# Patient Record
Sex: MALE | Race: WHITE | NOT HISPANIC OR LATINO | Employment: OTHER | URBAN - METROPOLITAN AREA
[De-identification: names, ages, dates, MRNs, and addresses within clinical notes are randomized per-mention and may not be internally consistent; named-entity substitution may affect disease eponyms.]

---

## 2017-11-10 ENCOUNTER — HOSPITAL ENCOUNTER (INPATIENT)
Facility: MEDICAL CENTER | Age: 52
LOS: 11 days | DRG: 854 | End: 2017-11-21
Attending: EMERGENCY MEDICINE | Admitting: HOSPITALIST
Payer: MEDICAID

## 2017-11-10 ENCOUNTER — APPOINTMENT (OUTPATIENT)
Dept: RADIOLOGY | Facility: MEDICAL CENTER | Age: 52
DRG: 854 | End: 2017-11-10
Attending: EMERGENCY MEDICINE
Payer: MEDICAID

## 2017-11-10 ENCOUNTER — RESOLUTE PROFESSIONAL BILLING HOSPITAL PROF FEE (OUTPATIENT)
Dept: HOSPITALIST | Facility: MEDICAL CENTER | Age: 52
End: 2017-11-10
Payer: MEDICAID

## 2017-11-10 DIAGNOSIS — R65.20 SEVERE SEPSIS (HCC): ICD-10-CM

## 2017-11-10 DIAGNOSIS — L89.899 DECUBITUS ULCER OF RIGHT FOOT, UNSPECIFIED ULCER STAGE: ICD-10-CM

## 2017-11-10 DIAGNOSIS — A41.9 SEVERE SEPSIS (HCC): ICD-10-CM

## 2017-11-10 DIAGNOSIS — E87.20 LACTIC ACIDOSIS: ICD-10-CM

## 2017-11-10 DIAGNOSIS — I96 GANGRENE OF TOE OF RIGHT FOOT (HCC): ICD-10-CM

## 2017-11-10 DIAGNOSIS — L03.115 CELLULITIS OF RIGHT LOWER EXTREMITY: ICD-10-CM

## 2017-11-10 LAB
ALBUMIN SERPL BCP-MCNC: 3.4 G/DL (ref 3.2–4.9)
ALBUMIN/GLOB SERPL: 0.8 G/DL
ALP SERPL-CCNC: 94 U/L (ref 30–99)
ALT SERPL-CCNC: 24 U/L (ref 2–50)
ANION GAP SERPL CALC-SCNC: 15 MMOL/L (ref 0–11.9)
AST SERPL-CCNC: 21 U/L (ref 12–45)
BASOPHILS # BLD AUTO: 0 % (ref 0–1.8)
BASOPHILS # BLD: 0 K/UL (ref 0–0.12)
BILIRUB SERPL-MCNC: 1 MG/DL (ref 0.1–1.5)
BNP SERPL-MCNC: 43 PG/ML (ref 0–100)
BUN SERPL-MCNC: 22 MG/DL (ref 8–22)
CALCIUM SERPL-MCNC: 9.1 MG/DL (ref 8.5–10.5)
CHLORIDE SERPL-SCNC: 95 MMOL/L (ref 96–112)
CO2 SERPL-SCNC: 21 MMOL/L (ref 20–33)
CREAT SERPL-MCNC: 0.98 MG/DL (ref 0.5–1.4)
EKG IMPRESSION: NORMAL
EOSINOPHIL # BLD AUTO: 0.14 K/UL (ref 0–0.51)
EOSINOPHIL NFR BLD: 0.9 % (ref 0–6.9)
ERYTHROCYTE [DISTWIDTH] IN BLOOD BY AUTOMATED COUNT: 38.6 FL (ref 35.9–50)
GFR SERPL CREATININE-BSD FRML MDRD: >60 ML/MIN/1.73 M 2
GLOBULIN SER CALC-MCNC: 4.4 G/DL (ref 1.9–3.5)
GLUCOSE BLD-MCNC: 182 MG/DL (ref 65–99)
GLUCOSE BLD-MCNC: 207 MG/DL (ref 65–99)
GLUCOSE SERPL-MCNC: 229 MG/DL (ref 65–99)
HCT VFR BLD AUTO: 49.2 % (ref 42–52)
HGB BLD-MCNC: 16.9 G/DL (ref 14–18)
LACTATE BLD-SCNC: 1.6 MMOL/L (ref 0.5–2)
LACTATE BLD-SCNC: 1.8 MMOL/L (ref 0.5–2)
LACTATE BLD-SCNC: 3.3 MMOL/L (ref 0.5–2)
LYMPHOCYTES # BLD AUTO: 2.91 K/UL (ref 1–4.8)
LYMPHOCYTES NFR BLD: 19.3 % (ref 22–41)
MANUAL DIFF BLD: NORMAL
MCH RBC QN AUTO: 28 PG (ref 27–33)
MCHC RBC AUTO-ENTMCNC: 34.3 G/DL (ref 33.7–35.3)
MCV RBC AUTO: 81.5 FL (ref 81.4–97.8)
MICROCYTES BLD QL SMEAR: ABNORMAL
MONOCYTES # BLD AUTO: 0.83 K/UL (ref 0–0.85)
MONOCYTES NFR BLD AUTO: 5.5 % (ref 0–13.4)
MORPHOLOGY BLD-IMP: NORMAL
NEUTROPHILS # BLD AUTO: 11.08 K/UL (ref 1.82–7.42)
NEUTROPHILS NFR BLD: 73.4 % (ref 44–72)
NRBC # BLD AUTO: 0 K/UL
NRBC BLD AUTO-RTO: 0 /100 WBC
PLATELET # BLD AUTO: 334 K/UL (ref 164–446)
PLATELET BLD QL SMEAR: NORMAL
PMV BLD AUTO: 10.8 FL (ref 9–12.9)
POIKILOCYTOSIS BLD QL SMEAR: NORMAL
POTASSIUM SERPL-SCNC: 3.3 MMOL/L (ref 3.6–5.5)
PROMYELOCYTES NFR BLD MANUAL: 0.9 %
PROT SERPL-MCNC: 7.8 G/DL (ref 6–8.2)
RBC # BLD AUTO: 6.04 M/UL (ref 4.7–6.1)
RBC BLD AUTO: PRESENT
SODIUM SERPL-SCNC: 131 MMOL/L (ref 135–145)
TROPONIN I SERPL-MCNC: <0.01 NG/ML (ref 0–0.04)
WBC # BLD AUTO: 15.1 K/UL (ref 4.8–10.8)

## 2017-11-10 PROCEDURE — 84484 ASSAY OF TROPONIN QUANT: CPT

## 2017-11-10 PROCEDURE — 500881 HCHG PACK, EXTREMITY: Performed by: ORTHOPAEDIC SURGERY

## 2017-11-10 PROCEDURE — 87186 SC STD MICRODIL/AGAR DIL: CPT

## 2017-11-10 PROCEDURE — 87015 SPECIMEN INFECT AGNT CONCNTJ: CPT | Mod: 91

## 2017-11-10 PROCEDURE — 160039 HCHG SURGERY MINUTES - EA ADDL 1 MIN LEVEL 3: Performed by: ORTHOPAEDIC SURGERY

## 2017-11-10 PROCEDURE — 85007 BL SMEAR W/DIFF WBC COUNT: CPT

## 2017-11-10 PROCEDURE — 160009 HCHG ANES TIME/MIN: Performed by: ORTHOPAEDIC SURGERY

## 2017-11-10 PROCEDURE — 700105 HCHG RX REV CODE 258

## 2017-11-10 PROCEDURE — 88311 DECALCIFY TISSUE: CPT

## 2017-11-10 PROCEDURE — 700102 HCHG RX REV CODE 250 W/ 637 OVERRIDE(OP)

## 2017-11-10 PROCEDURE — 93005 ELECTROCARDIOGRAM TRACING: CPT | Performed by: EMERGENCY MEDICINE

## 2017-11-10 PROCEDURE — 87075 CULTR BACTERIA EXCEPT BLOOD: CPT

## 2017-11-10 PROCEDURE — 99223 1ST HOSP IP/OBS HIGH 75: CPT | Performed by: HOSPITALIST

## 2017-11-10 PROCEDURE — 160028 HCHG SURGERY MINUTES - 1ST 30 MINS LEVEL 3: Performed by: ORTHOPAEDIC SURGERY

## 2017-11-10 PROCEDURE — 700101 HCHG RX REV CODE 250

## 2017-11-10 PROCEDURE — 700111 HCHG RX REV CODE 636 W/ 250 OVERRIDE (IP): Performed by: HOSPITALIST

## 2017-11-10 PROCEDURE — 700111 HCHG RX REV CODE 636 W/ 250 OVERRIDE (IP): Performed by: FAMILY MEDICINE

## 2017-11-10 PROCEDURE — 99285 EMERGENCY DEPT VISIT HI MDM: CPT

## 2017-11-10 PROCEDURE — 700105 HCHG RX REV CODE 258: Performed by: HOSPITALIST

## 2017-11-10 PROCEDURE — 160035 HCHG PACU - 1ST 60 MINS PHASE I: Performed by: ORTHOPAEDIC SURGERY

## 2017-11-10 PROCEDURE — 0Y6P0Z0 DETACHMENT AT RIGHT 1ST TOE, COMPLETE, OPEN APPROACH: ICD-10-PCS | Performed by: ORTHOPAEDIC SURGERY

## 2017-11-10 PROCEDURE — 87077 CULTURE AEROBIC IDENTIFY: CPT

## 2017-11-10 PROCEDURE — 700105 HCHG RX REV CODE 258: Performed by: EMERGENCY MEDICINE

## 2017-11-10 PROCEDURE — 700102 HCHG RX REV CODE 250 W/ 637 OVERRIDE(OP): Performed by: HOSPITALIST

## 2017-11-10 PROCEDURE — 87205 SMEAR GRAM STAIN: CPT | Mod: 91

## 2017-11-10 PROCEDURE — A9270 NON-COVERED ITEM OR SERVICE: HCPCS

## 2017-11-10 PROCEDURE — 700111 HCHG RX REV CODE 636 W/ 250 OVERRIDE (IP)

## 2017-11-10 PROCEDURE — 83880 ASSAY OF NATRIURETIC PEPTIDE: CPT

## 2017-11-10 PROCEDURE — 73630 X-RAY EXAM OF FOOT: CPT | Mod: RT

## 2017-11-10 PROCEDURE — 96375 TX/PRO/DX INJ NEW DRUG ADDON: CPT

## 2017-11-10 PROCEDURE — 88305 TISSUE EXAM BY PATHOLOGIST: CPT

## 2017-11-10 PROCEDURE — 71010 DX-CHEST-PORTABLE (1 VIEW): CPT

## 2017-11-10 PROCEDURE — 36415 COLL VENOUS BLD VENIPUNCTURE: CPT

## 2017-11-10 PROCEDURE — 160048 HCHG OR STATISTICAL LEVEL 1-5: Performed by: ORTHOPAEDIC SURGERY

## 2017-11-10 PROCEDURE — 96367 TX/PROPH/DG ADDL SEQ IV INF: CPT

## 2017-11-10 PROCEDURE — 85027 COMPLETE CBC AUTOMATED: CPT

## 2017-11-10 PROCEDURE — 160002 HCHG RECOVERY MINUTES (STAT): Performed by: ORTHOPAEDIC SURGERY

## 2017-11-10 PROCEDURE — 82962 GLUCOSE BLOOD TEST: CPT

## 2017-11-10 PROCEDURE — 700111 HCHG RX REV CODE 636 W/ 250 OVERRIDE (IP): Performed by: EMERGENCY MEDICINE

## 2017-11-10 PROCEDURE — 501838 HCHG SUTURE GENERAL: Performed by: ORTHOPAEDIC SURGERY

## 2017-11-10 PROCEDURE — 83605 ASSAY OF LACTIC ACID: CPT | Mod: 91

## 2017-11-10 PROCEDURE — 87040 BLOOD CULTURE FOR BACTERIA: CPT | Mod: 91

## 2017-11-10 PROCEDURE — 80053 COMPREHEN METABOLIC PANEL: CPT

## 2017-11-10 PROCEDURE — 87070 CULTURE OTHR SPECIMN AEROBIC: CPT | Mod: 91

## 2017-11-10 PROCEDURE — 700101 HCHG RX REV CODE 250: Performed by: HOSPITALIST

## 2017-11-10 PROCEDURE — 96365 THER/PROPH/DIAG IV INF INIT: CPT

## 2017-11-10 PROCEDURE — 160036 HCHG PACU - EA ADDL 30 MINS PHASE I: Performed by: ORTHOPAEDIC SURGERY

## 2017-11-10 PROCEDURE — 770020 HCHG ROOM/CARE - TELE (206)

## 2017-11-10 RX ORDER — PROMETHAZINE HYDROCHLORIDE 25 MG/1
12.5-25 TABLET ORAL EVERY 4 HOURS PRN
Status: DISCONTINUED | OUTPATIENT
Start: 2017-11-10 | End: 2017-11-21 | Stop reason: HOSPADM

## 2017-11-10 RX ORDER — AMOXICILLIN 250 MG
2 CAPSULE ORAL 2 TIMES DAILY
Status: DISCONTINUED | OUTPATIENT
Start: 2017-11-10 | End: 2017-11-21 | Stop reason: HOSPADM

## 2017-11-10 RX ORDER — AMPICILLIN AND SULBACTAM 2; 1 G/1; G/1
3 INJECTION, POWDER, FOR SOLUTION INTRAMUSCULAR; INTRAVENOUS ONCE
Status: COMPLETED | OUTPATIENT
Start: 2017-11-10 | End: 2017-11-10

## 2017-11-10 RX ORDER — SODIUM CHLORIDE 9 MG/ML
30 INJECTION, SOLUTION INTRAVENOUS
Status: DISCONTINUED | OUTPATIENT
Start: 2017-11-10 | End: 2017-11-10

## 2017-11-10 RX ORDER — NYSTATIN 100000 [USP'U]/G
POWDER TOPICAL 2 TIMES DAILY
Status: DISCONTINUED | OUTPATIENT
Start: 2017-11-10 | End: 2017-11-21 | Stop reason: HOSPADM

## 2017-11-10 RX ORDER — SODIUM CHLORIDE 9 MG/ML
1000 INJECTION, SOLUTION INTRAVENOUS
Status: COMPLETED | OUTPATIENT
Start: 2017-11-10 | End: 2017-11-10

## 2017-11-10 RX ORDER — SODIUM CHLORIDE 9 MG/ML
30 INJECTION, SOLUTION INTRAVENOUS
Status: DISCONTINUED | OUTPATIENT
Start: 2017-11-10 | End: 2017-11-11

## 2017-11-10 RX ORDER — ONDANSETRON 2 MG/ML
4 INJECTION INTRAMUSCULAR; INTRAVENOUS ONCE
Status: COMPLETED | OUTPATIENT
Start: 2017-11-10 | End: 2017-11-10

## 2017-11-10 RX ORDER — SODIUM CHLORIDE AND POTASSIUM CHLORIDE 150; 900 MG/100ML; MG/100ML
INJECTION, SOLUTION INTRAVENOUS CONTINUOUS
Status: DISCONTINUED | OUTPATIENT
Start: 2017-11-10 | End: 2017-11-11

## 2017-11-10 RX ORDER — PROMETHAZINE HYDROCHLORIDE 25 MG/1
12.5-25 SUPPOSITORY RECTAL EVERY 4 HOURS PRN
Status: DISCONTINUED | OUTPATIENT
Start: 2017-11-10 | End: 2017-11-21 | Stop reason: HOSPADM

## 2017-11-10 RX ORDER — BISACODYL 10 MG
10 SUPPOSITORY, RECTAL RECTAL
Status: DISCONTINUED | OUTPATIENT
Start: 2017-11-10 | End: 2017-11-21 | Stop reason: HOSPADM

## 2017-11-10 RX ORDER — SODIUM CHLORIDE 9 MG/ML
INJECTION, SOLUTION INTRAVENOUS
Status: COMPLETED
Start: 2017-11-10 | End: 2017-11-10

## 2017-11-10 RX ORDER — MORPHINE SULFATE 4 MG/ML
2 INJECTION, SOLUTION INTRAMUSCULAR; INTRAVENOUS EVERY 6 HOURS PRN
Status: DISCONTINUED | OUTPATIENT
Start: 2017-11-10 | End: 2017-11-21 | Stop reason: HOSPADM

## 2017-11-10 RX ORDER — DEXTROSE MONOHYDRATE 25 G/50ML
25 INJECTION, SOLUTION INTRAVENOUS
Status: DISCONTINUED | OUTPATIENT
Start: 2017-11-10 | End: 2017-11-21 | Stop reason: HOSPADM

## 2017-11-10 RX ORDER — SODIUM CHLORIDE 9 MG/ML
500 INJECTION, SOLUTION INTRAVENOUS
Status: DISCONTINUED | OUTPATIENT
Start: 2017-11-10 | End: 2017-11-11

## 2017-11-10 RX ORDER — ONDANSETRON 4 MG/1
4 TABLET, ORALLY DISINTEGRATING ORAL EVERY 4 HOURS PRN
Status: DISCONTINUED | OUTPATIENT
Start: 2017-11-10 | End: 2017-11-21 | Stop reason: HOSPADM

## 2017-11-10 RX ORDER — POLYETHYLENE GLYCOL 3350 17 G/17G
1 POWDER, FOR SOLUTION ORAL
Status: DISCONTINUED | OUTPATIENT
Start: 2017-11-10 | End: 2017-11-21 | Stop reason: HOSPADM

## 2017-11-10 RX ORDER — ONDANSETRON 2 MG/ML
4 INJECTION INTRAMUSCULAR; INTRAVENOUS EVERY 4 HOURS PRN
Status: DISCONTINUED | OUTPATIENT
Start: 2017-11-10 | End: 2017-11-21 | Stop reason: HOSPADM

## 2017-11-10 RX ORDER — MORPHINE SULFATE 4 MG/ML
4 INJECTION, SOLUTION INTRAMUSCULAR; INTRAVENOUS ONCE
Status: COMPLETED | OUTPATIENT
Start: 2017-11-10 | End: 2017-11-10

## 2017-11-10 RX ADMIN — AMPICILLIN SODIUM AND SULBACTAM SODIUM 3 G: 2; 1 INJECTION, POWDER, FOR SOLUTION INTRAMUSCULAR; INTRAVENOUS at 15:09

## 2017-11-10 RX ADMIN — SODIUM CHLORIDE 1000 ML: 9 INJECTION, SOLUTION INTRAVENOUS at 15:03

## 2017-11-10 RX ADMIN — INSULIN HUMAN 4 UNITS: 100 INJECTION, SOLUTION PARENTERAL at 22:02

## 2017-11-10 RX ADMIN — SODIUM CHLORIDE 500 ML: 9 INJECTION, SOLUTION INTRAVENOUS at 21:34

## 2017-11-10 RX ADMIN — POTASSIUM CHLORIDE AND SODIUM CHLORIDE: 900; 150 INJECTION, SOLUTION INTRAVENOUS at 17:45

## 2017-11-10 RX ADMIN — HYDROCODONE BITARTRATE AND ACETAMINOPHEN 15 ML: 2.5; 108 SOLUTION ORAL at 20:15

## 2017-11-10 RX ADMIN — NYSTATIN 1500000 UNITS: 100000 POWDER TOPICAL at 21:56

## 2017-11-10 RX ADMIN — AMPICILLIN SODIUM AND SULBACTAM SODIUM 3 G: 2; 1 INJECTION, POWDER, FOR SOLUTION INTRAMUSCULAR; INTRAVENOUS at 21:32

## 2017-11-10 RX ADMIN — MORPHINE SULFATE 2 MG: 4 INJECTION INTRAVENOUS at 23:51

## 2017-11-10 RX ADMIN — ENOXAPARIN SODIUM 40 MG: 100 INJECTION SUBCUTANEOUS at 17:45

## 2017-11-10 RX ADMIN — FENTANYL CITRATE 50 MCG: 50 INJECTION, SOLUTION INTRAMUSCULAR; INTRAVENOUS at 20:05

## 2017-11-10 RX ADMIN — VANCOMYCIN HYDROCHLORIDE 3000 MG: 5 INJECTION, POWDER, LYOPHILIZED, FOR SOLUTION INTRAVENOUS at 16:11

## 2017-11-10 RX ADMIN — ONDANSETRON 4 MG: 2 INJECTION INTRAMUSCULAR; INTRAVENOUS at 15:03

## 2017-11-10 RX ADMIN — MORPHINE SULFATE 4 MG: 4 INJECTION INTRAVENOUS at 15:03

## 2017-11-10 ASSESSMENT — PAIN SCALES - GENERAL
PAINLEVEL_OUTOF10: 2
PAINLEVEL_OUTOF10: 0
PAINLEVEL_OUTOF10: 2
PAINLEVEL_OUTOF10: 4
PAINLEVEL_OUTOF10: 7
PAINLEVEL_OUTOF10: 0
PAINLEVEL_OUTOF10: 2
PAINLEVEL_OUTOF10: 0

## 2017-11-10 ASSESSMENT — COPD QUESTIONNAIRES
DO YOU EVER COUGH UP ANY MUCUS OR PHLEGM?: NO/ONLY WITH OCCASIONAL COLDS OR INFECTIONS
DURING THE PAST 4 WEEKS HOW MUCH DID YOU FEEL SHORT OF BREATH: NONE/LITTLE OF THE TIME
COPD SCREENING SCORE: 1
HAVE YOU SMOKED AT LEAST 100 CIGARETTES IN YOUR ENTIRE LIFE: NO/DON'T KNOW

## 2017-11-10 ASSESSMENT — PATIENT HEALTH QUESTIONNAIRE - PHQ9
6. FEELING BAD ABOUT YOURSELF - OR THAT YOU ARE A FAILURE OR HAVE LET YOURSELF OR YOUR FAMILY DOWN: SEVERAL DAYS
3. TROUBLE FALLING OR STAYING ASLEEP OR SLEEPING TOO MUCH: NOT AT ALL
9. THOUGHTS THAT YOU WOULD BE BETTER OFF DEAD, OR OF HURTING YOURSELF: NOT AT ALL
SUM OF ALL RESPONSES TO PHQ9 QUESTIONS 1 AND 2: 2
5. POOR APPETITE OR OVEREATING: NOT AT ALL
2. FEELING DOWN, DEPRESSED, IRRITABLE, OR HOPELESS: SEVERAL DAYS
SUM OF ALL RESPONSES TO PHQ QUESTIONS 1-9: 3
1. LITTLE INTEREST OR PLEASURE IN DOING THINGS: SEVERAL DAYS
8. MOVING OR SPEAKING SO SLOWLY THAT OTHER PEOPLE COULD HAVE NOTICED. OR THE OPPOSITE, BEING SO FIGETY OR RESTLESS THAT YOU HAVE BEEN MOVING AROUND A LOT MORE THAN USUAL: NOT AT ALL
7. TROUBLE CONCENTRATING ON THINGS, SUCH AS READING THE NEWSPAPER OR WATCHING TELEVISION: NOT AT ALL
4. FEELING TIRED OR HAVING LITTLE ENERGY: NOT AT ALL

## 2017-11-10 ASSESSMENT — ENCOUNTER SYMPTOMS
SHORTNESS OF BREATH: 1
NAUSEA: 1
FALLS: 1
VOMITING: 0
WEAKNESS: 1
TINGLING: 1
CHILLS: 1

## 2017-11-10 ASSESSMENT — LIFESTYLE VARIABLES
EVER_SMOKED: NEVER
DO YOU DRINK ALCOHOL: NO
ALCOHOL_USE: NO

## 2017-11-10 NOTE — ED PROVIDER NOTES
ED Provider Note    Scribed for Russ Kang M.D. by Cammie Cardona. 11/10/2017, 2:32 PM.    Primary care provider: Pcp Pt States None  Means of arrival: ambulance   History obtained from: patient   History limited by: none     CHIEF COMPLAINT  Chief Complaint   Patient presents with   • T-5000 GLF     Tripped on shoe lace while at job interview   • Shortness of Breath     Since last night   • Weakness     Generalized weakness   • Open Wound     Right great toe large open wound. States it opened up yesterday.        HPI  Sebastián Castro is a 52 y.o. male who presents to the Emergency Department for evaluation of an open wound to his right great toe which was first noted last night after he fell. Initially, he thought his wound was a blood blister but states his wound has since opened up. He has associated pain and tingling to the affected area that is exacerbated with walking. Patient also has redness to his wound which spreads up his right leg. He confirms nausea and chills. Patient denies vomiting. He also complains of shortness of breath and generalized weakness.  He denies history of diabetes.         REVIEW OF SYSTEMS  Review of Systems   Constitutional: Positive for chills.   Respiratory: Positive for shortness of breath.    Gastrointestinal: Positive for nausea. Negative for vomiting.   Musculoskeletal: Positive for falls.        + pain to right great toe with redness and tingling    Neurological: Positive for tingling and weakness.   All other systems reviewed and are negative.  C.       PAST MEDICAL HISTORY   has a past medical history of Hypertension.      SURGICAL HISTORY  patient denies any surgical history      SOCIAL HISTORY  Social History   Substance Use Topics   • Smoking status: Never Smoker        • Alcohol use No      History   Drug use: Unknown         CURRENT MEDICATIONS  Home Medications    **Home medications have not yet been reviewed for this encounter**         ALLERGIES  Allergies  "  Allergen Reactions   • Pcn [Penicillins] Hives and Rash      Rash & hives       PHYSICAL EXAM  VITAL SIGNS: BP (!) 162/94   Pulse (!) 120   Temp 36.5 °C (97.7 °F) (Temporal)   Resp 18   Ht 1.829 m (6')   Wt (!) 143.3 kg (315 lb 14.7 oz)   SpO2 99%   BMI 42.85 kg/m²   Constitutional:   No acute distress  HENT:  Moist mucous membranes  Eyes:  No conjunctivitis or icterus  Neck:  trachea is midline, no palpable thyroid  Lymphatic:  No cervical lymphadenopathy. No inguinal adenopathy.   Cardiovascular: Tachycardic, Regular rhythm, no murmurs  Thorax & Lungs:  Normal breath sounds, no rhonchi  Abdomen:  Obese. Soft, Non-tender  Skin:. Erythema within the pannus with breakdown and possible cellulitis. Ecchymosis at the pad of the right great with swelling and clear drainage from the first MTP joint to the dorsum of the foot extending to the lower half of the leg.   Back:  Non-tender, no CVA tenderness  Vascular:  symmetric radial pulse  Neurologic:  Normal gross motor          LABS  Labs Reviewed   LACTIC ACID - Abnormal; Notable for the following:        Result Value    Lactic Acid 3.3 (*)     All other components within normal limits   CBC WITH DIFFERENTIAL - Abnormal; Notable for the following:     WBC 15.1 (*)     Neutrophils-Polys 73.40 (*)     Lymphocytes 19.30 (*)     Neutrophils (Absolute) 11.08 (*)     All other components within normal limits   COMP METABOLIC PANEL - Abnormal; Notable for the following:     Sodium 131 (*)     Potassium 3.3 (*)     Chloride 95 (*)     Anion Gap 15.0 (*)     Glucose 229 (*)     Globulin 4.4 (*)     All other components within normal limits   LACTIC ACID   BLOOD CULTURE    Narrative:     Per Hospital Policy: Only change Specimen Src: to \"Line\" if  specified by physician order.   BLOOD CULTURE    Narrative:     Per Hospital Policy: Only change Specimen Src: to \"Line\" if  specified by physician order.   TROPONIN   BTYPE NATRIURETIC PEPTIDE   DIFFERENTIAL MANUAL   PERIPHERAL " SMEAR REVIEW   PLATELET ESTIMATE   MORPHOLOGY   ESTIMATED GFR   URINALYSIS   URINE CULTURE(NEW)   LACTIC ACID   URINE CULTURE(NEW)   All labs reviewed by me.        EKG Interpretation  Interpreted by me  Normal Sinus Rhythm. Rate 93  Non specific conduction delay  Normal corrected QTc  normal QRS  left [place]      RADIOLOGY  DX-FOOT-COMPLETE 3+ RIGHT   Final Result      1.  Soft tissue gas consistent with open wound in the plantar aspect of the distal medial aspect of the right 1st toe.      2.  No plain film evidence of osteomyelitis.      DX-CHEST-PORTABLE (1 VIEW)   Final Result      No pneumothorax identified.        The radiologist's interpretation of all radiological studies have been reviewed by me.    COURSE & MEDICAL DECISION MAKING  Pertinent Labs & Imaging studies reviewed. (See chart for details)    The differential diagnoses include but are not limited to: foot cellulitis with lymphangitis, yeast infection of the inguinal area and pannus with secondary bacterial infection.      2:32 PM - Patient seen and examined at bedside. Patient will be treated with IV fluids secondary to sepsis protocol, Unasyn 3 g, Zofran 4 mg, morphine 4 mg, Vancocin 3,000 mg. Ordered DX foot, DX chest, lactic acid, platelet estimate, estimated GFR, troponin, BNP, CBC, CMP, urine culture and blood culture  to evaluate his symptoms.     4:09 PM Consult with Dr. De La O, hospitalist, who agrees to admit the patient.       CRITICAL CARE  The very real possibilty of a deterioration of this patient's condition required the highest level of my preparedness for sudden, emergent intervention.  I provided critical care services, which included medication orders, frequent reevaluations of the patient's condition and response to treatment, ordering and reviewing test results, and discussing the case with various consultants.  The critical care time associated with the care of the patient was 35 minutes. Review chart for interventions. This  time is exclusive of any other billable procedures.       Medical Decision Making:  Patient presents with toe swelling erythema and cellulitis of the foot with lymphangitis. He also appears to have intertriginous cellulitis. Does have a significant lactic acidosis and tachycardia. Blood cultures were obtained the patient started on antibiotics. He is given a 30 mL/kg bolus for sepsis. Tetanus is up-to-date. In contacting hospitalist for admission.      DISPOSITION:  Patient will be admitted to Dr. De La O in critical condition.      FINAL IMPRESSION  1. Cellulitis of right lower extremity    2. Lactic acidosis      Critical care time of 35 minutes, as outlined above.       Cammie CLARKE (Johannaibshanel), am scribing for, and in the presence of, Russ Kang M.D..  Electronically signed by: Cammie Cardona (Antonina), 11/10/2017  Russ CLARKE M.D. personally performed the services described in this documentation, as scribed by Cammie Cardona in my presence, and it is both accurate and complete.    The note accurately reflects work and decisions made by me.  Russ Kang  11/10/2017  5:04 PM

## 2017-11-10 NOTE — ED NOTES
Ambulatory to triage with   Chief Complaint   Patient presents with   • T-5000 GLF     Tripped on shoe lace while at job interview   • Shortness of Breath     Since last night   • Weakness     Generalized weakness   • Open Wound     Right great toe large open wound. States it opened up yesterday.    Sepsis score is a 3. Charge RN aware.

## 2017-11-11 PROBLEM — I96 GANGRENE OF TOE OF RIGHT FOOT (HCC): Status: ACTIVE | Noted: 2017-11-11

## 2017-11-11 PROBLEM — R73.9 HYPERGLYCEMIA: Status: ACTIVE | Noted: 2017-11-11

## 2017-11-11 PROBLEM — E87.1 HYPONATREMIA: Status: ACTIVE | Noted: 2017-11-11

## 2017-11-11 PROBLEM — B37.2 CANDIDAL INTERTRIGO: Status: ACTIVE | Noted: 2017-11-11

## 2017-11-11 PROBLEM — E87.6 HYPOKALEMIA: Status: ACTIVE | Noted: 2017-11-11

## 2017-11-11 LAB
ANION GAP SERPL CALC-SCNC: 7 MMOL/L (ref 0–11.9)
APPEARANCE UR: ABNORMAL
BACTERIA #/AREA URNS HPF: NEGATIVE /HPF
BILIRUB UR QL STRIP.AUTO: ABNORMAL
BUN SERPL-MCNC: 21 MG/DL (ref 8–22)
CALCIUM SERPL-MCNC: 8.1 MG/DL (ref 8.5–10.5)
CHLORIDE SERPL-SCNC: 102 MMOL/L (ref 96–112)
CO2 SERPL-SCNC: 22 MMOL/L (ref 20–33)
COLOR UR: ABNORMAL
CREAT SERPL-MCNC: 0.75 MG/DL (ref 0.5–1.4)
EPI CELLS #/AREA URNS HPF: ABNORMAL /HPF
ERYTHROCYTE [DISTWIDTH] IN BLOOD BY AUTOMATED COUNT: 38.9 FL (ref 35.9–50)
EST. AVERAGE GLUCOSE BLD GHB EST-MCNC: 217 MG/DL
GFR SERPL CREATININE-BSD FRML MDRD: >60 ML/MIN/1.73 M 2
GLUCOSE BLD-MCNC: 150 MG/DL (ref 65–99)
GLUCOSE BLD-MCNC: 154 MG/DL (ref 65–99)
GLUCOSE BLD-MCNC: 165 MG/DL (ref 65–99)
GLUCOSE BLD-MCNC: 210 MG/DL (ref 65–99)
GLUCOSE SERPL-MCNC: 166 MG/DL (ref 65–99)
GLUCOSE UR STRIP.AUTO-MCNC: NEGATIVE MG/DL
GRAM STN SPEC: NORMAL
GRAM STN SPEC: NORMAL
HBA1C MFR BLD: 9.2 % (ref 0–5.6)
HCT VFR BLD AUTO: 41.4 % (ref 42–52)
HGB BLD-MCNC: 13.9 G/DL (ref 14–18)
KETONES UR STRIP.AUTO-MCNC: NEGATIVE MG/DL
LACTATE BLD-SCNC: 1.7 MMOL/L (ref 0.5–2)
LEUKOCYTE ESTERASE UR QL STRIP.AUTO: NEGATIVE
MCH RBC QN AUTO: 27.3 PG (ref 27–33)
MCHC RBC AUTO-ENTMCNC: 33.6 G/DL (ref 33.7–35.3)
MCV RBC AUTO: 81.3 FL (ref 81.4–97.8)
MICRO URNS: ABNORMAL
NITRITE UR QL STRIP.AUTO: NEGATIVE
PH UR STRIP.AUTO: 5 [PH]
PLATELET # BLD AUTO: 284 K/UL (ref 164–446)
PMV BLD AUTO: 10.5 FL (ref 9–12.9)
POTASSIUM SERPL-SCNC: 3 MMOL/L (ref 3.6–5.5)
PROT UR QL STRIP: 30 MG/DL
RBC # BLD AUTO: 5.09 M/UL (ref 4.7–6.1)
RBC # URNS HPF: ABNORMAL /HPF
RBC UR QL AUTO: NEGATIVE
SIGNIFICANT IND 70042: NORMAL
SIGNIFICANT IND 70042: NORMAL
SITE SITE: NORMAL
SITE SITE: NORMAL
SODIUM SERPL-SCNC: 131 MMOL/L (ref 135–145)
SOURCE SOURCE: NORMAL
SOURCE SOURCE: NORMAL
SP GR UR STRIP.AUTO: 1.02
UROBILINOGEN UR STRIP.AUTO-MCNC: 1 MG/DL
VANCOMYCIN TROUGH SERPL-MCNC: 20.7 UG/ML (ref 10–20)
WBC # BLD AUTO: 12.3 K/UL (ref 4.8–10.8)
WBC #/AREA URNS HPF: ABNORMAL /HPF

## 2017-11-11 PROCEDURE — A9270 NON-COVERED ITEM OR SERVICE: HCPCS | Performed by: INTERNAL MEDICINE

## 2017-11-11 PROCEDURE — 87086 URINE CULTURE/COLONY COUNT: CPT

## 2017-11-11 PROCEDURE — 83605 ASSAY OF LACTIC ACID: CPT

## 2017-11-11 PROCEDURE — 700111 HCHG RX REV CODE 636 W/ 250 OVERRIDE (IP): Performed by: FAMILY MEDICINE

## 2017-11-11 PROCEDURE — 80202 ASSAY OF VANCOMYCIN: CPT

## 2017-11-11 PROCEDURE — 770006 HCHG ROOM/CARE - MED/SURG/GYN SEMI*

## 2017-11-11 PROCEDURE — 700102 HCHG RX REV CODE 250 W/ 637 OVERRIDE(OP): Performed by: HOSPITALIST

## 2017-11-11 PROCEDURE — 700111 HCHG RX REV CODE 636 W/ 250 OVERRIDE (IP): Performed by: HOSPITALIST

## 2017-11-11 PROCEDURE — 80048 BASIC METABOLIC PNL TOTAL CA: CPT

## 2017-11-11 PROCEDURE — 83036 HEMOGLOBIN GLYCOSYLATED A1C: CPT

## 2017-11-11 PROCEDURE — 700105 HCHG RX REV CODE 258

## 2017-11-11 PROCEDURE — 85027 COMPLETE CBC AUTOMATED: CPT

## 2017-11-11 PROCEDURE — 82962 GLUCOSE BLOOD TEST: CPT

## 2017-11-11 PROCEDURE — 99232 SBSQ HOSP IP/OBS MODERATE 35: CPT | Performed by: INTERNAL MEDICINE

## 2017-11-11 PROCEDURE — 700111 HCHG RX REV CODE 636 W/ 250 OVERRIDE (IP)

## 2017-11-11 PROCEDURE — 700105 HCHG RX REV CODE 258: Performed by: HOSPITALIST

## 2017-11-11 PROCEDURE — 81001 URINALYSIS AUTO W/SCOPE: CPT

## 2017-11-11 PROCEDURE — 36415 COLL VENOUS BLD VENIPUNCTURE: CPT

## 2017-11-11 PROCEDURE — 700102 HCHG RX REV CODE 250 W/ 637 OVERRIDE(OP): Performed by: INTERNAL MEDICINE

## 2017-11-11 PROCEDURE — A9270 NON-COVERED ITEM OR SERVICE: HCPCS | Performed by: HOSPITALIST

## 2017-11-11 RX ORDER — POTASSIUM CHLORIDE 20 MEQ/1
40 TABLET, EXTENDED RELEASE ORAL 2 TIMES DAILY
Status: DISCONTINUED | OUTPATIENT
Start: 2017-11-11 | End: 2017-11-12

## 2017-11-11 RX ORDER — OXYCODONE HYDROCHLORIDE 5 MG/1
5 TABLET ORAL EVERY 4 HOURS PRN
Status: DISCONTINUED | OUTPATIENT
Start: 2017-11-11 | End: 2017-11-21 | Stop reason: HOSPADM

## 2017-11-11 RX ORDER — OXYCODONE HYDROCHLORIDE 10 MG/1
10 TABLET ORAL EVERY 4 HOURS PRN
Status: DISCONTINUED | OUTPATIENT
Start: 2017-11-11 | End: 2017-11-21 | Stop reason: HOSPADM

## 2017-11-11 RX ADMIN — NYSTATIN 1500000 UNITS: 100000 POWDER TOPICAL at 21:16

## 2017-11-11 RX ADMIN — POTASSIUM CHLORIDE 40 MEQ: 1500 TABLET, EXTENDED RELEASE ORAL at 10:00

## 2017-11-11 RX ADMIN — OXYCODONE HYDROCHLORIDE 10 MG: 10 TABLET ORAL at 22:57

## 2017-11-11 RX ADMIN — VANCOMYCIN HYDROCHLORIDE 2000 MG: 5 INJECTION, POWDER, LYOPHILIZED, FOR SOLUTION INTRAVENOUS at 16:05

## 2017-11-11 RX ADMIN — POTASSIUM CHLORIDE 40 MEQ: 1500 TABLET, EXTENDED RELEASE ORAL at 21:16

## 2017-11-11 RX ADMIN — INSULIN HUMAN 4 UNITS: 100 INJECTION, SOLUTION PARENTERAL at 06:37

## 2017-11-11 RX ADMIN — AMPICILLIN SODIUM AND SULBACTAM SODIUM 3 G: 2; 1 INJECTION, POWDER, FOR SOLUTION INTRAMUSCULAR; INTRAVENOUS at 06:28

## 2017-11-11 RX ADMIN — AMPICILLIN SODIUM AND SULBACTAM SODIUM 3 G: 2; 1 INJECTION, POWDER, FOR SOLUTION INTRAMUSCULAR; INTRAVENOUS at 22:57

## 2017-11-11 RX ADMIN — STANDARDIZED SENNA CONCENTRATE AND DOCUSATE SODIUM 2 TABLET: 8.6; 5 TABLET, FILM COATED ORAL at 21:16

## 2017-11-11 RX ADMIN — MORPHINE SULFATE 2 MG: 4 INJECTION INTRAVENOUS at 06:28

## 2017-11-11 RX ADMIN — INSULIN HUMAN 3 UNITS: 100 INJECTION, SOLUTION PARENTERAL at 17:16

## 2017-11-11 RX ADMIN — NYSTATIN 1500000 UNITS: 100000 POWDER TOPICAL at 10:01

## 2017-11-11 RX ADMIN — AMPICILLIN SODIUM AND SULBACTAM SODIUM 3 G: 2; 1 INJECTION, POWDER, FOR SOLUTION INTRAMUSCULAR; INTRAVENOUS at 18:19

## 2017-11-11 RX ADMIN — VANCOMYCIN HYDROCHLORIDE 2000 MG: 5 INJECTION, POWDER, LYOPHILIZED, FOR SOLUTION INTRAVENOUS at 03:58

## 2017-11-11 RX ADMIN — AMPICILLIN SODIUM AND SULBACTAM SODIUM 3 G: 2; 1 INJECTION, POWDER, FOR SOLUTION INTRAMUSCULAR; INTRAVENOUS at 12:10

## 2017-11-11 RX ADMIN — ENOXAPARIN SODIUM 40 MG: 100 INJECTION SUBCUTANEOUS at 10:00

## 2017-11-11 RX ADMIN — MORPHINE SULFATE 2 MG: 4 INJECTION INTRAVENOUS at 12:10

## 2017-11-11 RX ADMIN — OXYCODONE HYDROCHLORIDE 10 MG: 10 TABLET ORAL at 17:57

## 2017-11-11 ASSESSMENT — ENCOUNTER SYMPTOMS
BACK PAIN: 0
DIAPHORESIS: 0
COUGH: 0
FEVER: 0
HEARTBURN: 0
CLAUDICATION: 0
MYALGIAS: 0
SHORTNESS OF BREATH: 0
NAUSEA: 0
CONSTIPATION: 0
ABDOMINAL PAIN: 0
VOMITING: 0
WEAKNESS: 1
WHEEZING: 0
BRUISES/BLEEDS EASILY: 0
CHILLS: 0
CHILLS: 1
HEADACHES: 0
DIZZINESS: 0

## 2017-11-11 ASSESSMENT — PAIN SCALES - GENERAL
PAINLEVEL_OUTOF10: 0
PAINLEVEL_OUTOF10: 5
PAINLEVEL_OUTOF10: 5
PAINLEVEL_OUTOF10: 7
PAINLEVEL_OUTOF10: 2
PAINLEVEL_OUTOF10: 8
PAINLEVEL_OUTOF10: 7
PAINLEVEL_OUTOF10: 0
PAINLEVEL_OUTOF10: 1

## 2017-11-11 NOTE — CONSULTS
Sebastián Castro is a 52 y.o. male who presents with systemic illness in the setting of a right great toe infection.  It's been present for quite a while now, but he said it got worse last night when he fell.  Hes had a wound on the toe, with redness extending up the leg.  He's also felt ill and hasn't been able to eat for a few days.  No pain elsewhere.  He has some diminished sensation in the feet, but he is unsure if he is diabetic.    Past Medical History:   Diagnosis Date   • Hypertension        No past surgical history on file.    Medications  No current facility-administered medications on file prior to encounter.      No current outpatient prescriptions on file prior to encounter.       Allergies  Pcn [penicillins]    ROS  All other systems were reviewed and found to be negative    No family history on file.    Social History     Social History   • Marital status: Single     Spouse name: N/A   • Number of children: N/A   • Years of education: N/A     Social History Main Topics   • Smoking status: Never Smoker   • Smokeless tobacco: Not on file   • Alcohol use No   • Drug use: Unknown   • Sexual activity: Not on file     Other Topics Concern   • Not on file     Social History Narrative   • No narrative on file       Physical Exam  Vitals  Blood pressure (!) 162/94, pulse 87, temperature 36.5 °C (97.7 °F), temperature source Temporal, resp. rate 18, height 1.829 m (6'), weight (!) 143.3 kg (315 lb 14.7 oz), SpO2 99 %.  General: Well Developed, Well Nourished, no acute distress  HEENT: Normocephalic, atraumatic  Eyes: Anicteric, PERRLA, EOMI  Neck: Supple, nontender, no masses  Lungs: CTA, no wheezes or crackles  Heart: RRR, no murmurs, rubs or gallops  Abdomen: Soft, NT, ND  Pelvis: Stable to AP and Lateral Compression  Skin: Open wound right great toe  Extremities: The right great toe is discolored with an open wound medially about the distal phalanx.  There is some redness extending up onto the shin, but no  purulence of fluctuance proximal to the toe  Neuro: Diminished sensation in the feet  Vascular: Palpable DP pulse.    Radiographs:  DX-FOOT-COMPLETE 3+ RIGHT   Final Result      1.  Soft tissue gas consistent with open wound in the plantar aspect of the distal medial aspect of the right 1st toe.      2.  No plain film evidence of osteomyelitis.      DX-CHEST-PORTABLE (1 VIEW)   Final Result      No pneumothorax identified.          Laboratory Values  Recent Labs      11/10/17   1444   WBC  15.1*   RBC  6.04   HEMOGLOBIN  16.9   HEMATOCRIT  49.2   MCV  81.5   MCH  28.0   MCHC  34.3   RDW  38.6   PLATELETCT  334   MPV  10.8     Recent Labs      11/10/17   1444   SODIUM  131*   POTASSIUM  3.3*   CHLORIDE  95*   CO2  21   GLUCOSE  229*   BUN  22             Impression:    R great toe infection    His toe is infected and making him sick.  It's clearly a deep infection with deep purulence and necrosis, though xrays don't show any osteo at this time.  Discussed with Dr. De La O, who agrees the toe is making him sick.  Discussed with the patient that the toe is likely not salvageable, and things may get worse.  He is agreeable to right great toe amputation.  Will proceed with surgery tonight and send cultures.    -NWB on right foot  -Elevate  -Recommend limb preservation service consult

## 2017-11-11 NOTE — CARE PLAN
Problem: Infection  Goal: Will remain free from infection    Intervention: Implement standard precautions and perform hand washing before and after patient contact  Hand hygiene performed before and after contact with patient throughout shift.

## 2017-11-11 NOTE — DIETARY
NUTRITION SERVICES: BMI - Pt with BMI >40 (=42.3). Weight loss counseling not appropriate in acute care setting. RECOMMEND - Referral to outpatient nutrition services for weight management after D/C.

## 2017-11-11 NOTE — PROGRESS NOTES
[facility] Hospitalist Progress Note    Date of Service: 2017    Chief Complaint  52 y.o. male admitted 11/10/2017 with right toe infection    Interval Problem Update  He had toe amputation with Dr. Leo 11/10    Consultants/Specialty  Orthopedic surgery Dr. Leo    [facility]        Review of Systems   Constitutional: Negative for chills, diaphoresis and fever.   HENT: Negative for congestion.    Respiratory: Negative for cough, shortness of breath and wheezing.    Cardiovascular: Negative for chest pain, claudication and leg swelling.   Gastrointestinal: Negative for abdominal pain, constipation, heartburn, nausea and vomiting.   Genitourinary: Negative for dysuria.   Musculoskeletal: Negative for back pain and myalgias.   Skin: Negative for rash.   Neurological: Negative for dizziness and headaches.        Feet numbness chronically   Endo/Heme/Allergies: Does not bruise/bleed easily.      Physical Exam  Laboratory/Imaging   Hemodynamics  Temp (24hrs), Av.3 °C (93.7 °F), Min:3.4 °C (38.1 °F), Max:38.2 °C (100.8 °F)   Temperature: 36.4 °C (97.6 °F)  Pulse  Av.2  Min: 75  Max: 120 Heart Rate (Monitored): 83  Blood Pressure: 137/58, NIBP: 121/50      Respiratory      Respiration: 16, Pulse Oximetry: 95 %             Fluids    Intake/Output Summary (Last 24 hours) at 17 1545  Last data filed at 17 1500   Gross per 24 hour   Intake             2420 ml   Output              710 ml   Net             1710 ml       Nutrition  Orders Placed This Encounter   Procedures   • Diet Order     Standing Status:   Standing     Number of Occurrences:   1     Order Specific Question:   Diet:     Answer:   Diabetic [3]     Physical Exam   Constitutional: He is oriented to person, place, and time. No distress.   Eyes: Conjunctivae are normal. No scleral icterus.   Neck: Neck supple. No JVD present. No tracheal deviation present.   Large neck circumference   Cardiovascular: Normal rate and regular rhythm.   PMI is displaced.    No murmur heard.  Pulses:       Posterior tibial pulses are 1+ on the right side, and 1+ on the left side.   Pulmonary/Chest: No respiratory distress. He has no wheezes. He has no rales.   Abdominal: Bowel sounds are normal.   Musculoskeletal: He exhibits edema.   Right leg edema 2+ with erythema   Neurological: He is alert and oriented to person, place, and time. Coordination normal.   Skin: Skin is warm and dry. No rash noted. He is not diaphoretic.   Nursing note and vitals reviewed.      Recent Labs      11/10/17   1444  11/11/17   0037   WBC  15.1*  12.3*   RBC  6.04  5.09   HEMOGLOBIN  16.9  13.9*   HEMATOCRIT  49.2  41.4*   MCV  81.5  81.3*   MCH  28.0  27.3   MCHC  34.3  33.6*   RDW  38.6  38.9   PLATELETCT  334  284   MPV  10.8  10.5     Recent Labs      11/10/17   1444  11/11/17   0037   SODIUM  131*  131*   POTASSIUM  3.3*  3.0*   CHLORIDE  95*  102   CO2  21  22   GLUCOSE  229*  166*   BUN  22  21   CREATININE  0.98  0.75   CALCIUM  9.1  8.1*         Recent Labs      11/10/17   1444   BNPBTYPENAT  43              Assessment/Plan     Severe sepsis (CMS-[facility])- (present on admission)   Assessment & Plan    This is severe sepsis with organ dysfunction including musculoskeletal system   Now resolved with normal lactate level  Continue antibiotics        Gangrene of toe of right foot (CMS-[facility])- (present on admission)   Assessment & Plan    Toe amputation done  Continue unasyn for cellulitis and monitor healing  Wound care to continue        Class 3 obesity with serious comorbidity in adult   Assessment & Plan    Weight loss after discharge        Hypokalemia- (present on admission)   Assessment & Plan    Lower today, will order oral replacement        Hyponatremia- (present on admission)   Assessment & Plan    Fluids given, level stable, will monitor        Candidal intertrigo- (present on admission)   Assessment & Plan    Nystatin powder        Hyperglycemia- (present on admission)    Assessment & Plan    insulin to treat, this is due to type II diabetes mellitus  Will order diabetic education  HbA1c is over 9              Reviewed items::  Labs reviewed, Medications reviewed and Radiology images reviewed  Zabala catheter::  No Zabala  DVT prophylaxis pharmacological::  Enoxaparin (Lovenox)  Ulcer Prophylaxis::  Not indicated  Antibiotics:  Treating active infection/contamination beyond 24 hours perioperative coverage

## 2017-11-11 NOTE — ASSESSMENT & PLAN NOTE
Due to type II diabetes mellitus- newly dx.  HbA1c is  9.2  Continue Metformin  Continue Insulin-sliding scale, accu-checks and hypoglycemia protocol.  Diabetic Education

## 2017-11-11 NOTE — PROGRESS NOTES
Subjective:      Patient reports doing well. Patient denies chest pain, calf pain, shortness of breath.  Pain is currently under control.      Objective:    Alert and oriented x 3  Afebrile  Blood pressure 131/73, pulse 75, temperature 36.9 °C (98.5 °F), resp. rate 19, height 1.829 m (6'), weight (!) 141.5 kg (311 lb 15.2 oz), SpO2 97 %.    Recent Labs      11/10/17   1444  11/11/17   0037   WBC  15.1*  12.3*   RBC  6.04  5.09   HEMOGLOBIN  16.9  13.9*   HEMATOCRIT  49.2  41.4*   MCV  81.5  81.3*   MCH  28.0  27.3   MCHC  34.3  33.6*   RDW  38.6  38.9   PLATELETCT  334  284   MPV  10.8  10.5     Recent Labs      11/10/17   1444  11/11/17   0037   SODIUM  131*  131*   POTASSIUM  3.3*  3.0*   CHLORIDE  95*  102   CO2  21  22   GLUCOSE  229*  166*   BUN  22  21   CREATININE  0.98  0.75   CALCIUM  9.1  8.1*         Intake/Output Summary (Last 24 hours) at 11/11/17 0611  Last data filed at 11/11/17 0400   Gross per 24 hour   Intake             1330 ml   Output               10 ml   Net             1320 ml       Comfortable, no distress  Neurologically and vascularly intact with palpable DP  Dressing C/D/I      Assessment:    Doing well s/p right great toe amputation    Plan:    TTWB on the right leg  Elevate  Darco shoe  LPS consult

## 2017-11-11 NOTE — PROGRESS NOTES
Pharmacy Kinetics 52 y.o. male on vancomycin day # 1 11/10/2017    Currently on Vancomycin 3000 mg iv load x1 on 11/10@1600    Indication for Treatment: Sepsis 2/2 open great toe infection - no osteo on xray    Pertinent history per medical record: Admitted on 11/10/2017 for sepsis 2/2 open great toe infection.    Other antibiotics: Unasyn 3 g q6h    Allergies: Pcn [penicillins]     List concerns for renal function:  --Obesity (BMI 43); severe sepsis    Pertinent cultures to date:   None    Recent Labs      11/10/17   1444   WBC  15.1*   NEUTSPOLYS  73.40*     Recent Labs      11/10/17   1444   BUN  22   CREATININE  0.98   ALBUMIN  3.4     No results for input(s): VANCOTROUGH, VANCOPEAK, VANCORANDOM in the last 72 hours.No intake or output data in the 24 hours ending 11/10/17 1747   Blood pressure (!) 162/94, pulse 87, temperature 36.5 °C (97.7 °F), temperature source Temporal, resp. rate 18, height 1.829 m (6'), weight (!) 143.3 kg (315 lb 14.7 oz), SpO2 99 %. Temp (24hrs), Av.5 °C (97.7 °F), Min:36.5 °C (97.7 °F), Max:36.5 °C (97.7 °F)      A/P   1. Vancomycin dose change: 2000 mg iv q12h start @0400   2. Next vancomycin level: 17@1530 (ordered)  3. Goal trough: 12 to 20  4. Comments: 3 g load given 11/10@1600; draw VT early due to pt's obesity and rapid accumulation     Recent Labs      11/10/17   1444   WBC  15.1*   NEUTSPOLYS  73.40*       Jose F Peña, PharmD    Reviewed by Ana Stoll, XeniaD

## 2017-11-11 NOTE — ASSESSMENT & PLAN NOTE
Post Toe amputation 11-10-17   IV ertapanem first dose today, to get rest at infusion clinic  Diabetes control  Wound care to continue

## 2017-11-11 NOTE — PROGRESS NOTES
Report given to Jessy PÉREZ on Orthopedics. Pt given medications (see MAR). Waiting for transport.

## 2017-11-11 NOTE — PROGRESS NOTES
Patient transferred from [facility] to Orthopedics via bed. Awake and alert,  Right foot with dressing CDI, Patient right foot elevated up on pillows. Right AC IV patent.  2 RN skin check complete with Tabatha HAMPTON RN. Skin clear no breakdown, slight redness under panis being treated, Patient oriented to room and unit routine.

## 2017-11-11 NOTE — PROGRESS NOTES
Received pt from ER.  VSS, A&Ox4, reports of mild pain in right big toe, declines intervention.  Tele box placed, SR at 90 noted.  Bed locked in low position, call light within reach.

## 2017-11-11 NOTE — PROGRESS NOTES
"Pharmacy Kinetics 52 y.o. male on vancomycin day # 2 2017    Currently on Vancomycin 2000 mg iv q12hr (0400 1600)    Indication for Treatment: SSTI    Pertinent history per medical record: Admitted on 11/10/2017 for Sepsis.52 y.o. male who presented 11/10/2017 with Right great toe pain and swelling.Brought to the emergency room responding to have severe sepsis and gangrene of the toe that will require amputation. PMHx: HTN,  DM HbgA1c>9, Obesity  Other antibiotics: Unasyn 3 g iv q6hr    Allergies: Pcn [penicillins]     List concerns for renal function: BMI>40    Pertinent cultures to date:   11/10/17:PBCx2:NGTD  11/10/17:TISS Rt Toe:NGTD    Recent Labs      11/10/17   1444  17   0037   WBC  15.1*  12.3*   NEUTSPOLYS  73.40*   --      Recent Labs      11/10/17   1444  17   0037   BUN  22  21   CREATININE  0.98  0.75   ALBUMIN  3.4   --      No results for input(s): VANCOTROUGH, VANCOPEAK, VANCORANDOM in the last 72 hours.  Intake/Output Summary (Last 24 hours) at 17 1025  Last data filed at 17 0600   Gross per 24 hour   Intake             2180 ml   Output               10 ml   Net             2170 ml      Blood pressure 134/72, pulse 81, temperature 36.7 °C (98 °F), resp. rate 16, height 1.829 m (6' 0.01\"), weight (!) 141.5 kg (311 lb 15.2 oz), SpO2 94 %. Temp (24hrs), Av.3 °C (93.7 °F), Min:3.4 °C (38.1 °F), Max:38.2 °C (100.8 °F)      A/P   1. Vancomycin dose change: No change at this time  2. Next vancomycin level: 17@1530  3. Goal trough: 12-16 mcg/mL   4. Comments: Leukocytosis w/ decreasing trend, Tmax 100.8, s/p toe amputation. Cx NGTD. Renal labs appear stable , 2/2 high BMI vanco accumulation risk. Level ordered early. Pharmacy to follow.    Zachary MichaelsD BCPS AAHIVP  "

## 2017-11-11 NOTE — PROGRESS NOTES
Patient transferred to Orthopedics via bed from [facility]. Awake and alert. Right foot with dressing CDI. Patients right foot elevated up on pillows. Right AC IV patent.   2 RN skin check complete with Tabatha HAMPTON RN. Skin clear, no breakdown, slight redness under panis being treated. Patient oriented to room and unit routine.

## 2017-11-11 NOTE — PROGRESS NOTES
Received bedside report and assumed care. Pt is resting in bed. A&Ox4, no complaints of pain or discomfort at the moment. Instructed pt on safety precautions and POC. Tele box in place, call light in reach, bed in lowest position. Hourly rounding in place.

## 2017-11-11 NOTE — H&P
Hospital Medicine History and Physical    Date of Service  11/10/2017    Chief Complaint  Chief Complaint   Patient presents with   • T-5000 GLF     Tripped on shoe lace while at job interview   • Shortness of Breath     Since last night   • Weakness     Generalized weakness   • Open Wound     Right great toe large open wound. States it opened up yesterday.        History of Presenting Illness  52 y.o. male who presented 11/10/2017 with Right great toe pain and swelling. Mr. Castro has no preceding known medical conditions though has not sought medical care for quite some time noticed redness and swelling of his right great toe about 4-5 days ago as been worsening. He has had a ground-level fall today and is too weak to get up he has not been eating the past few days and has had some chills. Is brought to the emergency room responding to have severe sepsis and gangrene of the toe that will require amputation. I've spoken with Dr. Leo, orthopedics, for surgical evaluation.   Primary Care Physician  Pcp Pt States None    Consultants  Orthopedics    Code Status  Full  Review of Systems  Review of Systems   Constitutional: Positive for chills and malaise/fatigue.   Respiratory: Negative for shortness of breath.    Cardiovascular: Negative for chest pain.   Skin:        Redness in the skin under the pannus   Neurological: Positive for weakness.   All other systems reviewed and are negative.       Past Medical History  Past Medical History:   Diagnosis Date   • Hypertension        Surgical History  Past Surgical History:   Procedure Laterality Date   • TOE AMPUTATION Right 11/10/2017    Procedure: TOE AMPUTATION;  Surgeon: Osorio Leo M.D.;  Location: SURGERY [facility];  Service: Orthopedics       Medications  No current facility-administered medications on file prior to encounter.      No current outpatient prescriptions on file prior to encounter.       Family History  History reviewed. No pertinent family  history.    Social History  Social History   Substance Use Topics   • Smoking status: Never Smoker   • Smokeless tobacco: Not on file   • Alcohol use No       Allergies  Allergies   Allergen Reactions   • Pcn [Penicillins] Hives and Rash      Rash & hives        Physical Exam  Laboratory   Hemodynamics  Temp (24hrs), Av.4 °C (92.2 °F), Min:3.4 °C (38.1 °F), Max:38.2 °C (100.8 °F)   Temperature: 37 °C (98.6 °F)  Pulse  Av.9  Min: 78  Max: 120 Heart Rate (Monitored): 83  Blood Pressure: 122/67, NIBP: 121/50      Respiratory      Respiration: 18, Pulse Oximetry: 94 %             Physical Exam   Constitutional: He is oriented to person, place, and time. No distress.   Morbidly obese   HENT:   Head: Normocephalic and atraumatic.   Neck: Neck supple.   Cardiovascular: Normal rate, regular rhythm and normal heart sounds.    No murmur heard.  Abdominal: Soft. He exhibits no distension. There is no tenderness.   Musculoskeletal: He exhibits edema.   Left great toe has wet gangrene and ulcerations with blistering and proximal cellulitis that is warm to the touch. He has palpable dorsalis pedis and posterior tibial pulses   Neurological: He is alert and oriented to person, place, and time.   Skin: Skin is warm. He is not diaphoretic.   Significant redness in the skin folds of the pannus of the abdomen     Psychiatric: He has a normal mood and affect. His behavior is normal.       Recent Labs      11/10/17   1444  17   0037   WBC  15.1*  12.3*   RBC  6.04  5.09   HEMOGLOBIN  16.9  13.9*   HEMATOCRIT  49.2  41.4*   MCV  81.5  81.3*   MCH  28.0  27.3   MCHC  34.3  33.6*   RDW  38.6  38.9   PLATELETCT  334  284   MPV  10.8  10.5     Recent Labs      11/10/17   1444   SODIUM  131*   POTASSIUM  3.3*   CHLORIDE  95*   CO2  21   GLUCOSE  229*   BUN  22   CREATININE  0.98   CALCIUM  9.1     Recent Labs      11/10/17   1444   ALTSGPT  24   ASTSGOT  21   ALKPHOSPHAT  94   TBILIRUBIN  1.0   GLUCOSE  229*         Recent Labs       11/10/17   1444   BNPBTYPENAT  43         Lab Results   Component Value Date    TROPONINI <0.01 11/10/2017     Urinalysis:  No results found for: SPECGRAVITY, GLUCOSEUR, KETONES, NITRITE, WBCURINE, RBCURINE, BACTERIA, EPITHELCELL     Imaging  DX-FOOT-COMPLETE 3+ RIGHT   Final Result      1.  Soft tissue gas consistent with open wound in the plantar aspect of the distal medial aspect of the right 1st toe.      2.  No plain film evidence of osteomyelitis.      DX-CHEST-PORTABLE (1 VIEW)   Final Result      No pneumothorax identified.           Assessment/Plan     I anticipate this patient will require at least two midnights for appropriate medical management, necessitating inpatient admission.    Severe sepsis (CMS-[facility])- (present on admission)   Assessment & Plan    This is severe sepsis with organ dysfunction including musculoskeletal system as is evidenced by his need for amputation. He'll receive IV fluids in the setting of a lactic acidosis of 3.3. IV antibiotics will be given. The source of infection will be addressed by orthopedics. He continues to have cellulitis and will be on  IV antibiotics.        Gangrene of toe of right foot (CMS-HCC)- (present on admission)   Assessment & Plan    This is source of infection and what has associated cellulitis. Despite his penicillin allergy, patient has tolerated Unasyn in the emergency room therefore it will be continued. Dr. Leo, orthopedics, is being consulted for amputation and limb preservation services been consulted.        Hypokalemia- (present on admission)   Assessment & Plan    Potassium is 3.3 in the emergency room and potassium will be given intravenously        Hyponatremia- (present on admission)   Assessment & Plan    Sodium is 131 in the emergency room and IV fluids will be given        Candidal intertrigo- (present on admission)   Assessment & Plan    The abdominal pannus for which nystatin powder will be added.        Hyperglycemia- (present on  admission)   Assessment & Plan    Glucose is 229 in the emergency room and he most likely has diabetes mellitus. A glycohemoglobin as been ordered.            VTE prophylaxis: lovenox

## 2017-11-11 NOTE — CARE PLAN
Problem: Pain Management  Goal: Pain level will decrease to patient's comfort goal    Intervention: Follow pain managment plan developed in collaboration with patient and Interdisciplinary Team  Administered 2mg Morphine prn as ordered for pain in right foot.Will continue to monitor.

## 2017-11-11 NOTE — ED NOTES
Partial med rec  Patient stated he is taking Xerexepam and his pharmacy is [facility] [address].  Called [facility] - patient hasn't filled since 2015  Patient said it might of been [facility] [address] and they haven't filled for him since 2010.  Patient then said it's an old Rx and his room-mate will write down the name and bring it in.  Spoke to the nurse and pharmacist about this and I will check with patient later tonight.

## 2017-11-11 NOTE — ASSESSMENT & PLAN NOTE
This is severe sepsis with organ dysfunction including musculoskeletal system  2/2 to right toe gangrene s/p amputation.  Cultures + group B Strep.  Needs Plan for 6 weeks IV abx with ertapenem, need outpatient infusion set up.  Wound care to be set up   Sepsis resolved  First dose of ertapenem ordered today

## 2017-11-11 NOTE — PROGRESS NOTES
Pt observed, no change from original assessment, vss, c/o pain 5/10 in right toe. Administered 2mg Morphine prn as ordered. Pt AAOx4, no other signs or symptoms of distress, fall precautions in place, call light within reach, all questions answered, will continue to monitor.

## 2017-11-12 LAB
ANION GAP SERPL CALC-SCNC: 7 MMOL/L (ref 0–11.9)
BACTERIA TISS AEROBE CULT: ABNORMAL
BUN SERPL-MCNC: 13 MG/DL (ref 8–22)
CALCIUM SERPL-MCNC: 8.3 MG/DL (ref 8.5–10.5)
CHLORIDE SERPL-SCNC: 101 MMOL/L (ref 96–112)
CO2 SERPL-SCNC: 27 MMOL/L (ref 20–33)
CREAT SERPL-MCNC: 0.57 MG/DL (ref 0.5–1.4)
GFR SERPL CREATININE-BSD FRML MDRD: >60 ML/MIN/1.73 M 2
GLUCOSE BLD-MCNC: 124 MG/DL (ref 65–99)
GLUCOSE BLD-MCNC: 141 MG/DL (ref 65–99)
GLUCOSE BLD-MCNC: 144 MG/DL (ref 65–99)
GLUCOSE BLD-MCNC: 159 MG/DL (ref 65–99)
GLUCOSE SERPL-MCNC: 151 MG/DL (ref 65–99)
GRAM STN SPEC: ABNORMAL
GRAM STN SPEC: ABNORMAL
POTASSIUM SERPL-SCNC: 3.7 MMOL/L (ref 3.6–5.5)
SIGNIFICANT IND 70042: ABNORMAL
SIGNIFICANT IND 70042: ABNORMAL
SITE SITE: ABNORMAL
SITE SITE: ABNORMAL
SODIUM SERPL-SCNC: 135 MMOL/L (ref 135–145)
SOURCE SOURCE: ABNORMAL
SOURCE SOURCE: ABNORMAL

## 2017-11-12 PROCEDURE — 82962 GLUCOSE BLOOD TEST: CPT | Mod: 91

## 2017-11-12 PROCEDURE — 700102 HCHG RX REV CODE 250 W/ 637 OVERRIDE(OP): Performed by: HOSPITALIST

## 2017-11-12 PROCEDURE — 700105 HCHG RX REV CODE 258: Performed by: INTERNAL MEDICINE

## 2017-11-12 PROCEDURE — A9270 NON-COVERED ITEM OR SERVICE: HCPCS | Performed by: INTERNAL MEDICINE

## 2017-11-12 PROCEDURE — 99232 SBSQ HOSP IP/OBS MODERATE 35: CPT | Performed by: HOSPITALIST

## 2017-11-12 PROCEDURE — 700102 HCHG RX REV CODE 250 W/ 637 OVERRIDE(OP): Performed by: INTERNAL MEDICINE

## 2017-11-12 PROCEDURE — 700111 HCHG RX REV CODE 636 W/ 250 OVERRIDE (IP): Performed by: INTERNAL MEDICINE

## 2017-11-12 PROCEDURE — 36415 COLL VENOUS BLD VENIPUNCTURE: CPT

## 2017-11-12 PROCEDURE — 700105 HCHG RX REV CODE 258: Performed by: HOSPITALIST

## 2017-11-12 PROCEDURE — A9270 NON-COVERED ITEM OR SERVICE: HCPCS | Performed by: HOSPITALIST

## 2017-11-12 PROCEDURE — 80048 BASIC METABOLIC PNL TOTAL CA: CPT

## 2017-11-12 PROCEDURE — 770006 HCHG ROOM/CARE - MED/SURG/GYN SEMI*

## 2017-11-12 PROCEDURE — 700111 HCHG RX REV CODE 636 W/ 250 OVERRIDE (IP): Performed by: HOSPITALIST

## 2017-11-12 RX ORDER — POTASSIUM CHLORIDE 20 MEQ/1
40 TABLET, EXTENDED RELEASE ORAL DAILY
Status: DISCONTINUED | OUTPATIENT
Start: 2017-11-13 | End: 2017-11-21 | Stop reason: HOSPADM

## 2017-11-12 RX ADMIN — AMPICILLIN SODIUM AND SULBACTAM SODIUM 3 G: 2; 1 INJECTION, POWDER, FOR SOLUTION INTRAMUSCULAR; INTRAVENOUS at 11:05

## 2017-11-12 RX ADMIN — NYSTATIN 1500000 UNITS: 100000 POWDER TOPICAL at 20:16

## 2017-11-12 RX ADMIN — STANDARDIZED SENNA CONCENTRATE AND DOCUSATE SODIUM 2 TABLET: 8.6; 5 TABLET, FILM COATED ORAL at 08:14

## 2017-11-12 RX ADMIN — STANDARDIZED SENNA CONCENTRATE AND DOCUSATE SODIUM 2 TABLET: 8.6; 5 TABLET, FILM COATED ORAL at 20:16

## 2017-11-12 RX ADMIN — NYSTATIN 1500000 UNITS: 100000 POWDER TOPICAL at 08:15

## 2017-11-12 RX ADMIN — OXYCODONE HYDROCHLORIDE 10 MG: 10 TABLET ORAL at 12:35

## 2017-11-12 RX ADMIN — INSULIN HUMAN 3 UNITS: 100 INJECTION, SOLUTION PARENTERAL at 11:05

## 2017-11-12 RX ADMIN — POTASSIUM CHLORIDE 40 MEQ: 1500 TABLET, EXTENDED RELEASE ORAL at 08:15

## 2017-11-12 RX ADMIN — OXYCODONE HYDROCHLORIDE 10 MG: 10 TABLET ORAL at 04:19

## 2017-11-12 RX ADMIN — AMPICILLIN SODIUM AND SULBACTAM SODIUM 3 G: 2; 1 INJECTION, POWDER, FOR SOLUTION INTRAMUSCULAR; INTRAVENOUS at 06:04

## 2017-11-12 RX ADMIN — OXYCODONE HYDROCHLORIDE 10 MG: 10 TABLET ORAL at 08:14

## 2017-11-12 RX ADMIN — OXYCODONE HYDROCHLORIDE 10 MG: 10 TABLET ORAL at 17:03

## 2017-11-12 RX ADMIN — AMPICILLIN SODIUM AND SULBACTAM SODIUM 3 G: 2; 1 INJECTION, POWDER, FOR SOLUTION INTRAMUSCULAR; INTRAVENOUS at 17:01

## 2017-11-12 RX ADMIN — VANCOMYCIN HYDROCHLORIDE 2000 MG: 5 INJECTION, POWDER, LYOPHILIZED, FOR SOLUTION INTRAVENOUS at 08:14

## 2017-11-12 RX ADMIN — ENOXAPARIN SODIUM 40 MG: 100 INJECTION SUBCUTANEOUS at 08:15

## 2017-11-12 RX ADMIN — METFORMIN HYDROCHLORIDE 500 MG: 500 TABLET, FILM COATED ORAL at 17:01

## 2017-11-12 RX ADMIN — AMPICILLIN SODIUM AND SULBACTAM SODIUM 3 G: 2; 1 INJECTION, POWDER, FOR SOLUTION INTRAMUSCULAR; INTRAVENOUS at 23:11

## 2017-11-12 RX ADMIN — OXYCODONE HYDROCHLORIDE 10 MG: 10 TABLET ORAL at 23:12

## 2017-11-12 ASSESSMENT — ENCOUNTER SYMPTOMS
MYALGIAS: 0
WHEEZING: 0
SHORTNESS OF BREATH: 0
EYE DISCHARGE: 0
VOMITING: 0
CONSTIPATION: 0
BACK PAIN: 0
TINGLING: 1
CLAUDICATION: 0
DIZZINESS: 0
STRIDOR: 0
NAUSEA: 0
EYE REDNESS: 0
DIAPHORESIS: 0
BRUISES/BLEEDS EASILY: 0
FEVER: 1
HEARTBURN: 0
CHILLS: 0
ABDOMINAL PAIN: 0
COUGH: 0

## 2017-11-12 ASSESSMENT — PAIN SCALES - GENERAL
PAINLEVEL_OUTOF10: 0
PAINLEVEL_OUTOF10: 3
PAINLEVEL_OUTOF10: 0
PAINLEVEL_OUTOF10: 6
PAINLEVEL_OUTOF10: 6
PAINLEVEL_OUTOF10: 5

## 2017-11-12 ASSESSMENT — PAIN SCALES - WONG BAKER: WONGBAKER_NUMERICALRESPONSE: DOESN'T HURT AT ALL

## 2017-11-12 NOTE — CARE PLAN
Problem: Nutritional:  Goal: Patient to verbalize or demonstrate understanding of diet  Outcome: MET Date Met: 11/12/17  Provided DM diet education and handout. Pt interested and asked questions

## 2017-11-12 NOTE — CARE PLAN
Problem: Pain Management  Goal: Pain level will decrease to patient's comfort goal    Intervention: Follow pain managment plan developed in collaboration with patient and Interdisciplinary Team  Trying PO pain medications for complaints of pain in foot.

## 2017-11-12 NOTE — PROGRESS NOTES
[facility] Hospitalist Progress Note    Date of Service: 2017    Chief Complaint  52 y.o. Male  admitted 11/10/2017 with right great toe swelling, pain . Dx Severe sepsis w Gangrene Rt great Toe- post  Right Great Toe Amputation 11-10-17     Interval Problem Update    Low grade fever this morning. Blood sugars improved control.     Consultants/Specialty  Orthopedic surgery Dr. Leo    Disposition  Anticipate Dc to SNF .        Review of Systems   Constitutional: Positive for fever. Negative for chills and diaphoresis.   HENT: Negative for congestion.    Eyes: Negative for discharge and redness.   Respiratory: Negative for cough, shortness of breath, wheezing and stridor.    Cardiovascular: Positive for leg swelling. Negative for chest pain and claudication.   Gastrointestinal: Negative for abdominal pain, constipation, heartburn, nausea and vomiting.   Genitourinary: Negative for dysuria.   Musculoskeletal: Positive for joint pain. Negative for back pain and myalgias.   Skin: Negative for itching and rash.   Neurological: Positive for tingling (chronic in feet.). Negative for dizziness.   Endo/Heme/Allergies: Does not bruise/bleed easily.      Physical Exam  Laboratory/Imaging   Hemodynamics  Temp (24hrs), Av.9 °C (98.4 °F), Min:36.4 °C (97.5 °F), Max:38.2 °C (100.7 °F)   Temperature: (!) 38.2 °C (100.7 °F)  Pulse  Av  Min: 75  Max: 120    Blood Pressure: 152/77      Respiratory      Respiration: 18, Pulse Oximetry: 91 %             Fluids    Intake/Output Summary (Last 24 hours) at 17 0930  Last data filed at 17 0900   Gross per 24 hour   Intake              960 ml   Output             1700 ml   Net             -740 ml       Nutrition  Orders Placed This Encounter   Procedures   • Diet Order     Standing Status:   Standing     Number of Occurrences:   1     Order Specific Question:   Diet:     Answer:   Diabetic [3]     Physical Exam   Constitutional: He is oriented to person, place, and  time. No distress.   Eyes: Conjunctivae and EOM are normal. No scleral icterus.   Neck: Neck supple.   Cardiovascular: Normal rate and regular rhythm.    No murmur heard.  Pulmonary/Chest: No stridor. No respiratory distress. He has no wheezes. He has no rales.   Abdominal: Soft. Bowel sounds are normal. There is no tenderness.   Obese.    Musculoskeletal: He exhibits edema and tenderness.   w erythema-Surgical wound dressed.    Neurological: He is alert and oriented to person, place, and time. No cranial nerve deficit.   Skin: Skin is warm and dry. No rash noted. He is not diaphoretic.   Psychiatric: He has a normal mood and affect. His behavior is normal.   Nursing note and vitals reviewed.      Recent Labs      11/10/17   1444  11/11/17   0037   WBC  15.1*  12.3*   RBC  6.04  5.09   HEMOGLOBIN  16.9  13.9*   HEMATOCRIT  49.2  41.4*   MCV  81.5  81.3*   MCH  28.0  27.3   MCHC  34.3  33.6*   RDW  38.6  38.9   PLATELETCT  334  284   MPV  10.8  10.5     Recent Labs      11/10/17   1444  11/11/17   0037  11/12/17   0334   SODIUM  131*  131*  135   POTASSIUM  3.3*  3.0*  3.7   CHLORIDE  95*  102  101   CO2  21  22  27   GLUCOSE  229*  166*  151*   BUN  22  21  13   CREATININE  0.98  0.75  0.57   CALCIUM  9.1  8.1*  8.3*         Recent Labs      11/10/17   1444   BNPBTYPENAT  43              Assessment/Plan     * Severe sepsis (CMS-HCC)- (present on admission)   Assessment & Plan    This is severe sepsis with organ dysfunction including musculoskeletal system - recurrent fevers possible due to residual cellulitis.   Continue IV unasyn  Prn tylenol  Encourage IS   Further workup if persistent.         Gangrene of toe of right foot (CMS-HCC)- (present on admission)   Assessment & Plan    Post Toe amputation 11-10-17   Continue IV  Unasyn for cellulitis.  Diabetes control  Wound care to continue        Hypokalemia- (present on admission)   Assessment & Plan    Corrected   Decrease oral kcl.        Hyperglycemia- (present  on admission)   Assessment & Plan    Due to type II diabetes mellitus- newly dx.  HbA1c is  9.2  SSI as needed- start oral hypoglycemic-metformin  Diabetic ed.        Class 3 obesity with serious comorbidity in adult   Assessment & Plan    Weight loss after discharge, counseling, diabetic diet  Metformin may help.        Hyponatremia- (present on admission)   Assessment & Plan    Corrected  Hl ivfs.        Candidal intertrigo- (present on admission)   Assessment & Plan    Nystatin powder            Reviewed items::  Labs reviewed, Medications reviewed and Radiology images reviewed  Zabala catheter::  No Zabala  DVT prophylaxis pharmacological::  Enoxaparin (Lovenox)  Ulcer Prophylaxis::  Not indicated  Antibiotics:  Treating active infection/contamination beyond 24 hours perioperative coverage

## 2017-11-12 NOTE — CARE PLAN
Problem: Communication  Goal: The ability to communicate needs accurately and effectively will improve  Outcome: PROGRESSING AS EXPECTED  Patient calls appropriately for assistance    Problem: Pain Management  Goal: Pain level will decrease to patient's comfort goal  Outcome: PROGRESSING AS EXPECTED  Pain managed through use of prn pain medicaiton    Problem: Fluid Volume:  Goal: Will maintain balanced intake and output  Outcome: PROGRESSING AS EXPECTED  Patient drinking adequate PO fluids and urinating adequately

## 2017-11-12 NOTE — PROGRESS NOTES
Pharmacy Kinetics 52 y.o. male on vancomycin day # 2 11/11/2017    Currently Dose: Vancomycin 2000 mg iv q12hr  Received Load Dose: Yes    Indication for Treatment: SSTI  ID Service Following: No    Pertinent history per medical record: Admitted on 11/10/2017 for right lower great toe pain with noted edema. Subsequently with tentative sepsis suspected secondary to presenting infection. Surgery consulted.    Other antibiotics: ampicillin/sulbactam 3 gm iv q6h    Allergies: Pcn [penicillins]     List concerns for accumulation of vancomycin: age ~52, BMI ~42, electrolyte derangement, BUN:SCr ~20:1    Pertinent cultures to date:   Results     Procedure Component Value Units Date/Time    CULTURE TISSUE W/ GRM STAIN [[id number]]  (Abnormal) Collected:  11/10/17 1917    Order Status:  Completed Specimen:  Tissue Updated:  11/11/17 1439     Significant Indicator POS (POS)     Source TISS     Site Right Great Toe Deep Tissue     Tissue Culture -- (A)     Gram Stain Result -- (A)     Few WBCs.  Few Gram positive cocci.  Rare Gram negative rods.       Tissue Culture -- (A)     Proteus species  Moderate growth       Tissue Culture -- (A)     Streptococcus agalactiae (Group B)  Moderate growth      ANAEROBIC CULTURE [[id number]] Collected:  11/10/17 1917    Order Status:  Completed Specimen:  Tissue Updated:  11/11/17 1439     Significant Indicator NEG     Source TISS     Site Right Great Toe Deep Tissue     Anaerobic Culture, Culture Res Culture in progress.    CULTURE TISSUE W/ GRM STAIN [[id number]]  (Abnormal) Collected:  11/10/17 1917    Order Status:  Completed Specimen:  Tissue Updated:  11/11/17 1439     Significant Indicator POS (POS)     Source TISS     Site Periosteal Right Great Toe     Tissue Culture -- (A)     Gram Stain Result -- (A)     Rare WBCs.  Few Gram positive cocci.       Tissue Culture -- (A)     Proteus species  Light growth       Tissue Culture -- (A)     Streptococcus agalactiae (Group B)  Moderate  "growth      ANAEROBIC CULTURE [[id number]] Collected:  11/10/17 1917    Order Status:  Completed Specimen:  Tissue Updated:  11/11/17 1439     Significant Indicator NEG     Source TISS     Site Periosteal Right Great Toe     Anaerobic Culture, Culture Res Culture in progress.    BLOOD CULTURE [[id number]] Collected:  11/10/17 1452    Order Status:  Completed Specimen:  Blood from Peripheral Updated:  11/11/17 0904     Significant Indicator NEG     Source BLD     Site PERIPHERAL     Blood Culture --     No Growth    Note: Blood cultures are incubated for 5 days and  are monitored continuously.Positive blood cultures  are called to the RN and reported as soon as  they are identified.      Narrative:       Per Hospital Policy: Only change Specimen Src: to \"Line\" if  specified by physician order.    BLOOD CULTURE [[id number]] Collected:  11/10/17 1440    Order Status:  Completed Specimen:  Blood from Peripheral Updated:  11/11/17 0904     Significant Indicator NEG     Source BLD     Site PERIPHERAL     Blood Culture --     No Growth    Note: Blood cultures are incubated for 5 days and  are monitored continuously.Positive blood cultures  are called to the RN and reported as soon as  they are identified.      Narrative:       Per Hospital Policy: Only change Specimen Src: to \"Line\" if  specified by physician order.    GRAM STAIN [[id number]] Collected:  11/10/17 1917    Order Status:  Completed Specimen:  Tissue Updated:  11/11/17 0845     Significant Indicator .     Source TISS     Site Periosteal Right Great Toe     Gram Stain Result --     Rare WBCs.  Few Gram positive cocci.      GRAM STAIN [[id number]] Collected:  11/10/17 1917    Order Status:  Completed Specimen:  Tissue Updated:  11/11/17 0845     Significant Indicator .     Source TISS     Site Right Great Toe Deep Tissue     Gram Stain Result --     Few WBCs.  Few Gram positive cocci.  Rare Gram negative rods.      URINALYSIS [[id number]]  (Abnormal) Collected:  " "17    Order Status:  Completed Specimen:  Urine Updated:  17     Color DK Yellow     Character Cloudy (A)     Specific Gravity 1.024     Ph 5.0     Glucose Negative mg/dL      Ketones Negative mg/dL      Protein 30 (A) mg/dL      Bilirubin Small (A)     Urobilinogen, Urine 1.0     Nitrite Negative     Leukocyte Esterase Negative     Occult Blood Negative     Micro Urine Req Microscopic    Narrative:       Indication for culture:->Emergency Room Patient    URINE CULTURE(NEW) [[id number]] Collected:  17    Order Status:  Completed Specimen:  Urine Updated:  17    Narrative:       Indication for culture:->Emergency Room Patient    URINALYSIS [[id number]]     Order Status:  Canceled Specimen:  Urine     URINE CULTURE(NEW) [[id number]] Collected:  11/10/17 0000    Order Status:  Canceled Specimen:  Urine     BLOOD CULTURE [[id number]] Collected:  11/10/17 0000    Order Status:  Canceled Specimen:  Other from Peripheral     BLOOD CULTURE [[id number]] Collected:  11/10/17 0000    Order Status:  Canceled Specimen:  Other from Peripheral         Recent Labs      11/10/17   1444  17   0037   WBC  15.1*  12.3*   NEUTSPOLYS  73.40*   --      Recent Labs      11/10/17   1444  17   0037   BUN  22  21   CREATININE  0.98  0.75   ALBUMIN  3.4   --      Recent Labs      17   1538   [facility]  20.7*     Intake/Output Summary (Last 24 hours) at 17 1704  Last data filed at 17 1500   Gross per 24 hour   Intake             2420 ml   Output              710 ml   Net             1710 ml      Blood pressure 137/58, pulse 85, temperature 36.4 °C (97.6 °F), resp. rate 16, height 1.829 m (6' 0.01\"), weight (!) 141.5 kg (311 lb 15.2 oz), SpO2 95 %. Temp (24hrs), Av.3 °C (93.7 °F), Min:3.4 °C (38.1 °F), Max:38.2 °C (100.8 °F)    Estimated Creatinine Clearance: 164.4 mL/min (by C-G formula based on SCr of 0.75 mg/dL).    A/P   1. Vancomycin dose change: 2000 mg iv q24h (~14 " mg/kg)   2. Next vancomycin level: ~2-3 days  3. Goal trough: 12-16 mcg/mL  4. Comments: VS stable. Febrile to Tmax 100.8 °F. Microbiology noted above. CrCl ~164 mL/min and inaccurate due to body habitus. Factors for accumulation noted. Given factors and early trough above goal will dose reduce using linear kinetics for goal trough ~12.5 mcg/mL. Will also allow additional time for vancomycin clearance prior to next dose. Pharmacy will follow and continue to adjust as appropriate.    Eric Peacock, PharmD

## 2017-11-12 NOTE — PROGRESS NOTES
Pharmacy Kinetics 52 y.o. male on vancomycin day # 3 11/12/2017    Currently Dose: Vancomycin 2000 mg iv q24hr (~14 mg/kg)  Received Load Dose: Yes    Indication for Treatment: SSTI  ID Service Following: No     Pertinent history per medical record: Admitted on 11/10/2017 for right lower great toe pain with noted edema. Subsequently with tentative sepsis suspected secondary to presenting infection. Surgery consulted.     Other antibiotics: ampicillin/sulbactam 3 gm iv q6h     Allergies: Pcn [penicillins]      List concerns for accumulation of vancomycin: age ~52, BMI ~42, electrolyte derangement, BUN:SCr ~20:1     Pertinent cultures to date:   Results     Procedure Component Value Units Date/Time    CULTURE TISSUE W/ GRM STAIN [[id number]]  (Abnormal) Collected:  11/10/17 1917    Order Status:  Completed Specimen:  Tissue Updated:  11/11/17 1439     Significant Indicator POS (POS)     Source TISS     Site Right Great Toe Deep Tissue     Tissue Culture -- (A)     Gram Stain Result -- (A)     Few WBCs.  Few Gram positive cocci.  Rare Gram negative rods.       Tissue Culture -- (A)     Proteus species  Moderate growth       Tissue Culture -- (A)     Streptococcus agalactiae (Group B)  Moderate growth      ANAEROBIC CULTURE [[id number]] Collected:  11/10/17 1917    Order Status:  Completed Specimen:  Tissue Updated:  11/11/17 1439     Significant Indicator NEG     Source TISS     Site Right Great Toe Deep Tissue     Anaerobic Culture, Culture Res Culture in progress.    CULTURE TISSUE W/ GRM STAIN [[id number]]  (Abnormal) Collected:  11/10/17 1917    Order Status:  Completed Specimen:  Tissue Updated:  11/11/17 1439     Significant Indicator POS (POS)     Source TISS     Site Periosteal Right Great Toe     Tissue Culture -- (A)     Gram Stain Result -- (A)     Rare WBCs.  Few Gram positive cocci.       Tissue Culture -- (A)     Proteus species  Light growth       Tissue Culture -- (A)     Streptococcus agalactiae (Group  "B)  Moderate growth      ANAEROBIC CULTURE [[id number]] Collected:  11/10/17 1917    Order Status:  Completed Specimen:  Tissue Updated:  11/11/17 1439     Significant Indicator NEG     Source TISS     Site Periosteal Right Great Toe     Anaerobic Culture, Culture Res Culture in progress.    BLOOD CULTURE [[id number]] Collected:  11/10/17 1452    Order Status:  Completed Specimen:  Blood from Peripheral Updated:  11/11/17 0904     Significant Indicator NEG     Source BLD     Site PERIPHERAL     Blood Culture --     No Growth    Note: Blood cultures are incubated for 5 days and  are monitored continuously.Positive blood cultures  are called to the RN and reported as soon as  they are identified.      Narrative:       Per Hospital Policy: Only change Specimen Src: to \"Line\" if  specified by physician order.    BLOOD CULTURE [[id number]] Collected:  11/10/17 1440    Order Status:  Completed Specimen:  Blood from Peripheral Updated:  11/11/17 0904     Significant Indicator NEG     Source BLD     Site PERIPHERAL     Blood Culture --     No Growth    Note: Blood cultures are incubated for 5 days and  are monitored continuously.Positive blood cultures  are called to the RN and reported as soon as  they are identified.      Narrative:       Per Hospital Policy: Only change Specimen Src: to \"Line\" if  specified by physician order.    GRAM STAIN [[id number]] Collected:  11/10/17 1917    Order Status:  Completed Specimen:  Tissue Updated:  11/11/17 0845     Significant Indicator .     Source TISS     Site Periosteal Right Great Toe     Gram Stain Result --     Rare WBCs.  Few Gram positive cocci.      GRAM STAIN [[id number]] Collected:  11/10/17 1917    Order Status:  Completed Specimen:  Tissue Updated:  11/11/17 0845     Significant Indicator .     Source TISS     Site Right Great Toe Deep Tissue     Gram Stain Result --     Few WBCs.  Few Gram positive cocci.  Rare Gram negative rods.      URINALYSIS [[id number]]  (Abnormal) " "Collected:  17    Order Status:  Completed Specimen:  Urine Updated:  17     Color DK Yellow     Character Cloudy (A)     Specific Gravity 1.024     Ph 5.0     Glucose Negative mg/dL      Ketones Negative mg/dL      Protein 30 (A) mg/dL      Bilirubin Small (A)     Urobilinogen, Urine 1.0     Nitrite Negative     Leukocyte Esterase Negative     Occult Blood Negative     Micro Urine Req Microscopic    Narrative:       Indication for culture:->Emergency Room Patient    URINE CULTURE(NEW) [[id number]] Collected:  17    Order Status:  Completed Specimen:  Urine Updated:  17    Narrative:       Indication for culture:->Emergency Room Patient    URINALYSIS [[id number]]     Order Status:  Canceled Specimen:  Urine     URINE CULTURE(NEW) [[id number]] Collected:  11/10/17 0000    Order Status:  Canceled Specimen:  Urine     BLOOD CULTURE [[id number]] Collected:  11/10/17 0000    Order Status:  Canceled Specimen:  Other from Peripheral     BLOOD CULTURE [[id number]] Collected:  11/10/17 0000    Order Status:  Canceled Specimen:  Other from Peripheral         Recent Labs      11/10/17   1444  17   0037   WBC  15.1*  12.3*   NEUTSPOLYS  73.40*   --      Recent Labs      11/10/17   1444  17   0037  17   0334   BUN  22  21  13   CREATININE  0.98  0.75  0.57   ALBUMIN  3.4   --    --      Recent Labs      17   1538   [facility]  20.7*     Intake/Output Summary (Last 24 hours) at 17 0917  Last data filed at 17 2200   Gross per 24 hour   Intake              960 ml   Output              900 ml   Net               60 ml      Blood pressure 152/77, pulse 78, temperature (!) 38.2 °C (100.7 °F), resp. rate 18, height 1.829 m (6' 0.01\"), weight (!) 141.5 kg (311 lb 15.2 oz), SpO2 91 %. Temp (24hrs), Av.9 °C (98.4 °F), Min:36.4 °C (97.5 °F), Max:38.2 °C (100.7 °F)    Estimated Creatinine Clearance: 216.3 mL/min (by C-G formula based on SCr of 0.57 " mg/dL).    A/P   1. Vancomycin dose change: not indicated   2. Next vancomycin level: ~1-2 days  3. Goal trough: 12-16 mcg/mL  4. Comments: VS stable. Febrile to Tmax 100.7 °F. Microbiology noted above (Streptococcus agalactiae (Group B); Proteus species). CrCl ~216 mL/min and inaccurate due to body habitus. Factors for accumulation of vancomycin noted. Most recent trough with dose adjustment noted. Likely to repeat trough in ~1-2 days. Given noted microbiology and recent OR intervention 11/10/17 may consider de-escalation of vancomycin. Pharmacy will follow and continue to adjust as appropriate    Eric Peacock, PharmD

## 2017-11-13 LAB
BACTERIA SPEC ANAEROBE CULT: NORMAL
BACTERIA SPEC ANAEROBE CULT: NORMAL
BACTERIA UR CULT: NORMAL
GLUCOSE BLD-MCNC: 121 MG/DL (ref 65–99)
GLUCOSE BLD-MCNC: 132 MG/DL (ref 65–99)
GLUCOSE BLD-MCNC: 139 MG/DL (ref 65–99)
GLUCOSE BLD-MCNC: 151 MG/DL (ref 65–99)
SIGNIFICANT IND 70042: NORMAL
SITE SITE: NORMAL
SOURCE SOURCE: NORMAL
VANCOMYCIN TROUGH SERPL-MCNC: 9.1 UG/ML (ref 10–20)

## 2017-11-13 PROCEDURE — 700105 HCHG RX REV CODE 258: Performed by: INTERNAL MEDICINE

## 2017-11-13 PROCEDURE — 90732 PPSV23 VACC 2 YRS+ SUBQ/IM: CPT | Performed by: HOSPITALIST

## 2017-11-13 PROCEDURE — 700111 HCHG RX REV CODE 636 W/ 250 OVERRIDE (IP): Performed by: HOSPITALIST

## 2017-11-13 PROCEDURE — 93922 UPR/L XTREMITY ART 2 LEVELS: CPT

## 2017-11-13 PROCEDURE — 700105 HCHG RX REV CODE 258: Performed by: HOSPITALIST

## 2017-11-13 PROCEDURE — 700111 HCHG RX REV CODE 636 W/ 250 OVERRIDE (IP): Performed by: INTERNAL MEDICINE

## 2017-11-13 PROCEDURE — 36415 COLL VENOUS BLD VENIPUNCTURE: CPT

## 2017-11-13 PROCEDURE — A9270 NON-COVERED ITEM OR SERVICE: HCPCS | Performed by: HOSPITALIST

## 2017-11-13 PROCEDURE — 700102 HCHG RX REV CODE 250 W/ 637 OVERRIDE(OP): Performed by: HOSPITALIST

## 2017-11-13 PROCEDURE — 80202 ASSAY OF VANCOMYCIN: CPT

## 2017-11-13 PROCEDURE — 700102 HCHG RX REV CODE 250 W/ 637 OVERRIDE(OP): Performed by: INTERNAL MEDICINE

## 2017-11-13 PROCEDURE — 770006 HCHG ROOM/CARE - MED/SURG/GYN SEMI*

## 2017-11-13 PROCEDURE — 82962 GLUCOSE BLOOD TEST: CPT | Mod: 91

## 2017-11-13 PROCEDURE — 99232 SBSQ HOSP IP/OBS MODERATE 35: CPT | Performed by: HOSPITALIST

## 2017-11-13 PROCEDURE — 90686 IIV4 VACC NO PRSV 0.5 ML IM: CPT | Performed by: HOSPITALIST

## 2017-11-13 PROCEDURE — A9270 NON-COVERED ITEM OR SERVICE: HCPCS | Performed by: INTERNAL MEDICINE

## 2017-11-13 PROCEDURE — 93922 UPR/L XTREMITY ART 2 LEVELS: CPT | Mod: 26 | Performed by: SURGERY

## 2017-11-13 RX ADMIN — INSULIN HUMAN 3 UNITS: 100 INJECTION, SOLUTION PARENTERAL at 10:49

## 2017-11-13 RX ADMIN — AMPICILLIN SODIUM AND SULBACTAM SODIUM 3 G: 2; 1 INJECTION, POWDER, FOR SOLUTION INTRAMUSCULAR; INTRAVENOUS at 05:48

## 2017-11-13 RX ADMIN — VANCOMYCIN HYDROCHLORIDE 2000 MG: 5 INJECTION, POWDER, LYOPHILIZED, FOR SOLUTION INTRAVENOUS at 09:37

## 2017-11-13 RX ADMIN — METFORMIN HYDROCHLORIDE 500 MG: 500 TABLET, FILM COATED ORAL at 16:45

## 2017-11-13 RX ADMIN — AMPICILLIN SODIUM AND SULBACTAM SODIUM 3 G: 2; 1 INJECTION, POWDER, FOR SOLUTION INTRAMUSCULAR; INTRAVENOUS at 17:00

## 2017-11-13 RX ADMIN — INFLUENZA A VIRUS A/MICHIGAN/45/2015 X-275 (H1N1) ANTIGEN (FORMALDEHYDE INACTIVATED), INFLUENZA A VIRUS A/HONG KONG/4801/2014 X-263B (H3N2) ANTIGEN (FORMALDEHYDE INACTIVATED), INFLUENZA B VIRUS B/PHUKET/3073/2013 ANTIGEN (FORMALDEHYDE INACTIVATED), AND INFLUENZA B VIRUS B/BRISBANE/60/2008 ANTIGEN (FORMALDEHYDE INACTIVATED) 0.5 ML: 15; 15; 15; 15 INJECTION, SUSPENSION INTRAMUSCULAR at 16:46

## 2017-11-13 RX ADMIN — PNEUMOCOCCAL VACCINE POLYVALENT 25 MCG
25; 25; 25; 25; 25; 25; 25; 25; 25; 25; 25; 25; 25; 25; 25; 25; 25; 25; 25; 25; 25; 25; 25 INJECTION, SOLUTION INTRAMUSCULAR; SUBCUTANEOUS at 16:49

## 2017-11-13 RX ADMIN — NYSTATIN: 100000 POWDER TOPICAL at 09:00

## 2017-11-13 RX ADMIN — OXYCODONE HYDROCHLORIDE 10 MG: 10 TABLET ORAL at 15:43

## 2017-11-13 RX ADMIN — OXYCODONE HYDROCHLORIDE 10 MG: 10 TABLET ORAL at 10:49

## 2017-11-13 RX ADMIN — POTASSIUM CHLORIDE 40 MEQ: 1500 TABLET, EXTENDED RELEASE ORAL at 09:37

## 2017-11-13 RX ADMIN — AMPICILLIN SODIUM AND SULBACTAM SODIUM 3 G: 2; 1 INJECTION, POWDER, FOR SOLUTION INTRAMUSCULAR; INTRAVENOUS at 12:23

## 2017-11-13 RX ADMIN — STANDARDIZED SENNA CONCENTRATE AND DOCUSATE SODIUM 2 TABLET: 8.6; 5 TABLET, FILM COATED ORAL at 09:37

## 2017-11-13 RX ADMIN — AMPICILLIN SODIUM AND SULBACTAM SODIUM 3 G: 2; 1 INJECTION, POWDER, FOR SOLUTION INTRAMUSCULAR; INTRAVENOUS at 23:51

## 2017-11-13 RX ADMIN — NYSTATIN 1500000 UNITS: 100000 POWDER TOPICAL at 20:16

## 2017-11-13 RX ADMIN — OXYCODONE HYDROCHLORIDE 10 MG: 10 TABLET ORAL at 05:52

## 2017-11-13 RX ADMIN — OXYCODONE HYDROCHLORIDE 10 MG: 10 TABLET ORAL at 20:16

## 2017-11-13 RX ADMIN — METFORMIN HYDROCHLORIDE 500 MG: 500 TABLET, FILM COATED ORAL at 09:37

## 2017-11-13 RX ADMIN — ENOXAPARIN SODIUM 40 MG: 100 INJECTION SUBCUTANEOUS at 09:37

## 2017-11-13 ASSESSMENT — ENCOUNTER SYMPTOMS
WHEEZING: 0
VOMITING: 0
CLAUDICATION: 0
BRUISES/BLEEDS EASILY: 0
BACK PAIN: 0
MYALGIAS: 0
DIAPHORESIS: 0
FEVER: 0
HEARTBURN: 0
CONSTIPATION: 0
CHILLS: 0
DIZZINESS: 0
NAUSEA: 0
EYE DISCHARGE: 0
SHORTNESS OF BREATH: 0
EYE REDNESS: 0
COUGH: 0
ABDOMINAL PAIN: 0
TINGLING: 1

## 2017-11-13 ASSESSMENT — PAIN SCALES - GENERAL
PAINLEVEL_OUTOF10: 0
PAINLEVEL_OUTOF10: 5
PAINLEVEL_OUTOF10: 4
PAINLEVEL_OUTOF10: 0
PAINLEVEL_OUTOF10: 7
PAINLEVEL_OUTOF10: 4
PAINLEVEL_OUTOF10: 0
PAINLEVEL_OUTOF10: 4

## 2017-11-13 ASSESSMENT — PAIN SCALES - WONG BAKER: WONGBAKER_NUMERICALRESPONSE: DOESN'T HURT AT ALL

## 2017-11-13 ASSESSMENT — LIFESTYLE VARIABLES: DO YOU DRINK ALCOHOL: NO

## 2017-11-13 NOTE — PROGRESS NOTES
"   Orthopaedic PA Progress Note    Interval changes:Pt ready for discharge home from surgiucal perspective, will need OP Abx. D/W Dr Leo and Dr Timmy CAMPOS - Patient denies any new issues. No chest pain, dyspnea, or fever.  Pain well controlled.    Blood pressure 156/90, pulse 85, temperature 36.4 °C (97.6 °F), resp. rate 18, height 1.829 m (6' 0.01\"), weight (!) 141.5 kg (311 lb 15.2 oz), SpO2 92 %.    Patient seen and examined  No acute distress  Breathing non labored  RRR  Surgical dressing is clean, dry, and intact. Patient clearly fires tibialis anterior, EHL, and gastrocnemius/soleus. Sensation is intact to light touch throughout superficial peroneal, deep peroneal, tibial, saphenous, and sural nerve distributions. Strong and palpable 2+ dorsalis pedis and posterior tibial pulses with capillary refill less than 2 seconds. No lower leg tenderness or discomfort.    Recent Labs      11/10/17   1444  11/11/17   0037   WBC  15.1*  12.3*   RBC  6.04  5.09   HEMOGLOBIN  16.9  13.9*   HEMATOCRIT  49.2  41.4*   MCV  81.5  81.3*   MCH  28.0  27.3   MCHC  34.3  33.6*   RDW  38.6  38.9   PLATELETCT  334  284   MPV  10.8  10.5       Active Hospital Problems    Diagnosis   • Gangrene of toe of right foot (CMS-[facility]) [I96]     Priority: High   • Severe sepsis (CMS-[facility]) [A41.9, R65.20]     Priority: High   • Candidal intertrigo [B37.2]     Priority: Low   • Hyponatremia [E87.1]     Priority: Low   • Class 3 obesity with serious comorbidity in adult [E66.9]     Priority: Low   • Hyperglycemia [R73.9]   • Hypokalemia [E87.6]       Assessment/Plan:  POD#3 S/P toe amputation  Wt bearing status - AT through the heel  PT/OT-initiated  Wound care:continue LPS as outpt  Drains - none  Zabala- none  Sutures/Staples out- 10-14 days post operatively  Antibiotics: per med  DVT Prophylaxis- TEDS/SCDs/Foot pumps. After discharge - ambulate ad tonja with wound precautions  Case Coordination for Discharge Planning - Disposition home when " cleared by med service

## 2017-11-13 NOTE — PROGRESS NOTES
Left message for Diabetic education. Pt up in chair.denies any complains at this time. Will monitor

## 2017-11-13 NOTE — PROGRESS NOTES
" Subjective:      Patient reports doing well. Patient denies chest pain, calf pain, shortness of breath.  Pain is currently under control.      Objective:    Alert and oriented x 3  Afebrile  Blood pressure 154/84, pulse 71, temperature 36.8 °C (98.2 °F), resp. rate 18, height 1.829 m (6' 0.01\"), weight (!) 141.5 kg (311 lb 15.2 oz), SpO2 91 %.    Recent Labs      11/10/17   1444  11/11/17   0037   WBC  15.1*  12.3*   RBC  6.04  5.09   HEMOGLOBIN  16.9  13.9*   HEMATOCRIT  49.2  41.4*   MCV  81.5  81.3*   MCH  28.0  27.3   MCHC  34.3  33.6*   RDW  38.6  38.9   PLATELETCT  334  284   MPV  10.8  10.5     Recent Labs      11/10/17   1444  11/11/17   0037  11/12/17   0334   SODIUM  131*  131*  135   POTASSIUM  3.3*  3.0*  3.7   CHLORIDE  95*  102  101   CO2  21  22  27   GLUCOSE  229*  166*  151*   BUN  22  21  13   CREATININE  0.98  0.75  0.57   CALCIUM  9.1  8.1*  8.3*         Intake/Output Summary (Last 24 hours) at 11/13/17 0644  Last data filed at 11/13/17 0600   Gross per 24 hour   Intake             1050 ml   Output             1600 ml   Net             -550 ml       Comfortable, no distress  Neurologically and vascularly intact with palpable pedal pulses bilaterally.  Dressing C/D/I      Assessment/Plan:    Doing well  Wound care per LPS    "

## 2017-11-13 NOTE — PROGRESS NOTES
LIMB PRESERVATION SERVICE     51 y/o male, new diagnosis DM    POD # 2 R great toe amputation  drsg changed    PLAN  Wound care orders placed  Offloading shoe, HWB  ID consult  Check ABIs    Full consult to follow

## 2017-11-13 NOTE — DIETARY
Nutrition Services:   Consult received for DM diet education. RD provided diet education yesterday see note 11/12. RD available prn.

## 2017-11-13 NOTE — CONSULTS
DATE OF SERVICE:  11/13/2017    REASON FOR CONSULT:  Open wound.    CONSULTING PHYSICIAN:  Dr. Warner.    HISTORY OF PRESENT ILLNESS:  This is a 52-year-old white male who was   originally admitted to the hospital on 11/10/2017 due to right great toe pain,   swelling and erythema.  As far as he knew he had no chronic medical problems;   however, he has since been diagnosed with diabetes.  The pain, erythema, and   swelling of his toe actually began 4-5 days prior to this admission.  Actually   he had a ground level fall.  He states he has had generalized weakness and   poor appetite, chills, but no fever.  When he was brought to the emergency   room, his great toe was felt to be gangrenous, so he was taken emergently to   the OR, found to have deep purulence and underwent great toe amputation at the   MTP joint.  He has been receiving vancomycin and Unasyn.  However, the wound   is failing to heal.  Infectious disease is consulted for antibiotic   recommendations and management.    REVIEW OF SYSTEMS:  Negative except for stated in the HPI.  He is denying any   problems with his medications.    ALLERGIES:  HE HAS A REPORTED HISTORY OF ALLERGY TO PENICILLIN; HOWEVER, HE IS   TOLERATING THE UNASYN WITHOUT SIDE EFFECT.    PAST MEDICAL HISTORY:  Denied.  He has been diagnosed with diabetes since   admission, hypertension.    FAMILY HISTORY:  Denied.    SOCIAL HISTORY:  He does not smoke, drink or use illicit drugs.    PHYSICAL EXAMINATION:  GENERAL:  He is an obese male, in no acute distress.  VITAL SIGNS:  Afebrile to 100.7, current temperature is 97.6, blood pressure   156/90, pulse of 85, respiratory rate 18, oxygen saturation 92% on room air,    and he weighs 141 kilos.  HEENT:  Normocephalic, atraumatic.  Pupils equal, round, and reactive to   light.  Extraocular movements are intact.  Oropharynx is clear.  NECK:  Supple.  CARDIOVASCULAR:  Regular rate and rhythm, unable to auscultate the murmurs.  CHEST:  Decreased  breath sounds at the bases.  Respirations are unlabored.  ABDOMEN:  Obese, soft, and nontender.  EXTREMITIES:  Show no cyanosis, clubbing, or edema on the left.  On the right,   he still has some edema of the foot.  Right great toe amputation site shows   sutures in place with serosanguineous drainage and appears to have an   incisional necrosis.  NEUROLOGIC:  He is awake, alert and oriented.  Speech is without dysarthria.    Cranial nerves are intact.    LABORATORY DATA:  White blood cell count 12.3, H and H 13.9 and 41.4,   platelets of 284.  Sodium 135, potassium 3.7, chloride 101, bicarbonate 27,   glucose 151, BUN 13, and creatinine is 0.57.  Urinalysis just showed few   wbc's, rbc's, and protein on admission.  His admission blood cultures x2 were   negative.  Cultures from his toe on admission did show Proteus mirabilis and   group B strep, Proteus was pan-sensitive.  Urine culture was negative.   Glycohemoglobin was 9.2.  Lactic acid on admission was 3.3.    ASSESSMENT AND PLAN:  A 52-year-old previously undiagnosed diabetic male,   admitted to the hospital with a diabetic foot infection with concern for   gangrene.  His fevers have resolved.  His leukocytosis is improving.  He is   status post emergent amputation of the right great toe; however, in spite of   appropriate antimicrobial therapy, the wound is failing to heal.  He is   planned for vascular evaluation.  We will discontinue the vancomycin due to   nephrotoxicity and no MRSA has been isolated.  He may require additional   surgery.  I will continue to monitor him very closely.  Limb preservation   service is also following.  Further recommendations are per culture results   and clinical course.    Thank you and we will follow with you.       ____________________________________     MD VAN WOODWARD / NOEMY    DD:  11/13/2017 14:25:31  DT:  11/13/2017 15:18:05    D#:  [id number]  Job#:  [id number]

## 2017-11-13 NOTE — PROGRESS NOTES
LIMB PRESERVATION SERVICE NOTE:    Date of Consultation: 11/12/2017    Reason for Consultation: R foot    History of Present Illness: 53 y/o male with new diagnosis of DM. Admitted for severe sepsis and gangrene of right great toe. Pt stated about 1 wk ago, he noticed a blood blister to the dorsal aspect of his right great toe that he applied abx ointment to. He went to a job interview at [employer] and when he came home, he became weak and fell at home. He called EMS and was taken to ED. Pt reports having F/C/N/V, lack of appetite. Pt was a , currently looking for a new job.     Ortho Dr. Leo consulted. POD # 2 s/p R great toe amputation     Past medical history, medicines, allergies, family history, social history,   and review of systems reviewed      PHYSICAL EXAMINATION:   General Appearance:  Well developed, obese, in no acute distress    Vascular Assessment:  R foot: +2 pedal pulses  L foot: unable to palpate pulses. But audible with doppler. Appear multiphasic     Sensory Assessment  R foot: diminished to plantar foot   L foot: insensate     Offloading  None. Wears boots. Worked as     Wound Assessment:    R great toe amputation site: incision approximated. Incisional necrosis about 1cm to medial aspect of incision.   Scant sang drainage present. Edema and erythema present. Erythema tracking towards shin.     R DTI to anterior tibial tendon dorsal aspect, not POA. Grey discoloration, tenderness, non blanching  Measurements: 1.1x0.5cm    R DTI lateral foot 5th MTH, 5th metatarsal, not POA. Blistered, grey, tenderness, non blanching  Measurements :5x1cm for 5th met and 1x0.8cm for the 5th MTH  Incision cleaned with NS, applied dry adaptic, dry gauze. Dry gauze placed over lateral foot and dorsal foot. Secured dressings with kerlix.     Toenails long, jagged. Left great toe dystrophic.       Labs  Recent Labs      11/10/17   1444  11/11/17   0037   WBC  15.1*  12.3*   RBC  6.04  5.09  "  HEMOGLOBIN  16.9  13.9*   HEMATOCRIT  49.2  41.4*   MCV  81.5  81.3*   MCH  28.0  27.3   MCHC  34.3  33.6*   RDW  38.6  38.9   PLATELETCT  334  284   MPV  10.8  10.5     Recent Labs      11/10/17   1444  11/11/17   0037  11/12/17   0334   SODIUM  131*  131*  135   POTASSIUM  3.3*  3.0*  3.7   CHLORIDE  95*  102  101   CO2  21  22  27   GLUCOSE  229*  166*  151*   BUN  22  21  13     /75   Pulse 77   Temp 36.3 °C (97.4 °F)   Resp 17   Ht 1.829 m (6' 0.01\")   Wt (!) 141.5 kg (311 lb 15.2 oz)   SpO2 91%   BMI 42.30 kg/m²     Imaging  Xray R foot:   1. Soft tissue gas consistent with open wound in the plantar aspect of the distal medial aspect of the right 1st toe.  2.  No plain film evidence of osteomyelitis.      Infection Management  Microbiology:   proteus  Strep group B    Antibiotics  Per IM        Patient Education    Discussed with pt in detail regarding checking feet at least daily,  Implications of loss of protective sensation (LOPS) discussed with patient- including increased risk for amputation.  Advised to check feet at least daily, moisturize feet, and to always wear protective foot wear. To not trim own nails but see a DPM or foot and nail nurse. To balance protein with complex carbs. Discussed diabetic foot wear and inserts post recovery from amputation to prevent further amputation. Discussed offloading of incision.          Plan:   1. R great toe amp: dressing orders placed. Avoid ace wrap d/t DTIs  2. DTI to R ant tib, 5th MTH, 5th metatarsal: wound care orders placed.   3. Offloading: diabetic shoe ordered- balance issues at this time. Boot not appropriate at this time  4. Vascular complications: difficult palpating pulses L foot. Varicose veins, tortuous veins noted. Check SHAYAN. New diabetic.   5. Cellulitis: consult ID, erythema and edema to RLE radiating towards shin  6. DM: diabetic educator consulted. New dx a1c of 9.2%    D/C plan: f/u with LPS rounds on 12/1. Arrange for outpt " wound care.

## 2017-11-13 NOTE — PROGRESS NOTES
[facility] Hospitalist Progress Note    Date of Service: 2017    Chief Complaint  52 y.o. Male  admitted 11/10/2017 with right great toe swelling, pain . Dx Severe sepsis w Gangrene Rt great Toe- post  Right Great Toe Amputation 11-10-17     Interval Problem Update    Fever resolved. Notes right foot still swollen, red.  Patient concerned re wound care fu and new dx of DM.      Consultants/Specialty  Orthopedic surgery Dr. Leo    Disposition  Anticipate Dc home ,will order home health.        Review of Systems   Constitutional: Negative for chills, diaphoresis and fever.   Eyes: Negative for discharge and redness.   Respiratory: Negative for cough, shortness of breath and wheezing.    Cardiovascular: Positive for leg swelling. Negative for chest pain and claudication.   Gastrointestinal: Negative for abdominal pain, constipation, heartburn, nausea and vomiting.   Genitourinary: Negative for dysuria.   Musculoskeletal: Positive for joint pain. Negative for back pain and myalgias.        Improved.   Skin: Negative for itching and rash.   Neurological: Positive for tingling (chronic in feet.). Negative for dizziness.   Endo/Heme/Allergies: Does not bruise/bleed easily.      Physical Exam  Laboratory/Imaging   Hemodynamics  Temp (24hrs), Av.6 °C (97.9 °F), Min:36.3 °C (97.4 °F), Max:36.8 °C (98.3 °F)   Temperature: 36.4 °C (97.6 °F)  Pulse  Av.8  Min: 71  Max: 120    Blood Pressure: 156/90      Respiratory      Respiration: 18, Pulse Oximetry: 92 %             Fluids    Intake/Output Summary (Last 24 hours) at 17 1033  Last data filed at 17 0900   Gross per 24 hour   Intake              650 ml   Output             1200 ml   Net             -550 ml       Nutrition  Orders Placed This Encounter   Procedures   • Diet Order     Standing Status:   Standing     Number of Occurrences:   1     Order Specific Question:   Diet:     Answer:   Diabetic [3]     Physical Exam   Constitutional: He is oriented  to person, place, and time. No distress.   HENT:   Head: Normocephalic and atraumatic.   Eyes: Conjunctivae and EOM are normal. No scleral icterus.   Neck: Neck supple.   Cardiovascular: Normal rate and regular rhythm.    No murmur heard.  Pulmonary/Chest: No stridor. No respiratory distress. He has no wheezes. He has no rales.   Abdominal: Soft. Bowel sounds are normal. He exhibits no distension. There is no tenderness.   Obese.    Musculoskeletal: He exhibits edema and tenderness.   Right great Toe amputation site w erythema, sutures in place , dry , mod swelling.    Neurological: He is alert and oriented to person, place, and time. No cranial nerve deficit.   Skin: Skin is warm and dry. No rash noted. He is not diaphoretic.   Psychiatric: He has a normal mood and affect. His behavior is normal.   Nursing note and vitals reviewed.      Recent Labs      11/10/17   1444  11/11/17   0037   WBC  15.1*  12.3*   RBC  6.04  5.09   HEMOGLOBIN  16.9  13.9*   HEMATOCRIT  49.2  41.4*   MCV  81.5  81.3*   MCH  28.0  27.3   MCHC  34.3  33.6*   RDW  38.6  38.9   PLATELETCT  334  284   MPV  10.8  10.5     Recent Labs      11/10/17   1444  11/11/17   0037  11/12/17   0334   SODIUM  131*  131*  135   POTASSIUM  3.3*  3.0*  3.7   CHLORIDE  95*  102  101   CO2  21  22  27   GLUCOSE  229*  166*  151*   BUN  22  21  13   CREATININE  0.98  0.75  0.57   CALCIUM  9.1  8.1*  8.3*         Recent Labs      11/10/17   1444   BNPBTYPENAT  43              Assessment/Plan     * Severe sepsis (CMS-HCC)- (present on admission)   Assessment & Plan    This is severe sepsis with organ dysfunction including musculoskeletal system - Fevers, Elev wbc improved-- persistent swelling redness forefoot-- Cx + Streptococcus, Proteus Mirabilis .   De escalate IV atbs- Dc Vanco- continue IV Unasyn.  Prn tylenol  Encourage IS           Gangrene of toe of right foot (CMS-HCC)- (present on admission)   Assessment & Plan    Post Toe amputation 11-10-17    IV   Unasyn for cellulitis-- anticipate can change to oral Augmentin for discharge  Diabetes control  Wound care to continue        Hypokalemia- (present on admission)   Assessment & Plan    Corrected   Oral kcl        Hyperglycemia- (present on admission)   Assessment & Plan    Due to type II diabetes mellitus- newly dx.  HbA1c is  9.2  SSI as needed  Metformin started.   Diabetic ed pending.        Class 3 obesity with serious comorbidity in adult   Assessment & Plan    Weight loss after discharge, counseling, diabetic diet  Metformin can help w wt loss.         Hyponatremia- (present on admission)   Assessment & Plan    Corrected  Hl ivfs.        Candidal intertrigo- (present on admission)   Assessment & Plan    Nystatin powder            Reviewed items::  Labs reviewed, Medications reviewed and Radiology images reviewed  Zabala catheter::  No Zabala  DVT prophylaxis pharmacological::  Enoxaparin (Lovenox)  Ulcer Prophylaxis::  Not indicated  Antibiotics:  Treating active infection/contamination beyond 24 hours perioperative coverage

## 2017-11-13 NOTE — FACE TO FACE
Face to Face Supporting Documentation - Home Health    The encounter with this patient was in whole or in part the primary reason for home health admission.    Date of encounter:   Patient:                    MRN:                       YOB: 2019  Sebastián Castro  [id number]  1965     Home health to see patient for:  Skilled Nursing care for assessment, interventions & education, Wound Care, Registered dietitian consult, Medical social work consult and Home health aide    Skilled need for:  New Onset Medical Diagnosis foot infection post amputation and Medication Management DM    Skilled nursing interventions to include:  Comment: wound care, RN, aide    Homebound status evidenced by:  Need the aid of supportive devices such as crutches, canes, wheelchairs or walkers, Require the use of special transportation or Needs the assistance of another person in order to leave the home. Leaving home requires a considerable and taxing effort. There is a normal inability to leave the home.    Community Physician to provide follow up care: Pcp Pt States None     Optional Interventions? No      I certify the face to face encounter for this home health care referral meets the CMS requirements and the encounter/clinical assessment with the patient was, in whole, or in part, for the medical condition(s) listed above, which is the primary reason for home health care. Based on my clinical findings: the service(s) are medically necessary, support the need for home health care, and the homebound criteria are met.  I certify that this patient has had a face to face encounter by myself.  Darwin Warner M.D. - NPI: [id number]

## 2017-11-13 NOTE — PROGRESS NOTES
"Brief Ortho PA progress note    S/ no new complaints  O/   /75   Pulse 77   Temp 36.3 °C (97.4 °F)   Resp 17   Ht 1.829 m (6' 0.01\")   Wt (!) 141.5 kg (311 lb 15.2 oz)   SpO2 91%   BMI 42.30 kg/m²      Seen with LPS  Incision well approximated, mild scabbing medial end of incision  Plethoric foot    A/ S/P Amputation Rt great toe    P/ Care per LPS, med        "

## 2017-11-13 NOTE — CARE PLAN
Problem: Infection  Goal: Will remain free from infection  Outcome: PROGRESSING AS EXPECTED  No s/s infection, dressing changes per orders, hand hygiene    Problem: Pain Management  Goal: Pain level will decrease to patient's comfort goal  Outcome: PROGRESSING AS EXPECTED  PRNs available, pt taking PO oxy, communicates pain

## 2017-11-13 NOTE — PROGRESS NOTES
Pt AOx4, monitoring pain, discussed pain management goals with pt. New offloading boot at bedside. Fall precautions in place. Call light within reach. Discussed IV antibiotic schedule with pt.

## 2017-11-14 LAB
GLUCOSE BLD-MCNC: 139 MG/DL (ref 65–99)
GLUCOSE BLD-MCNC: 148 MG/DL (ref 65–99)
GLUCOSE BLD-MCNC: 148 MG/DL (ref 65–99)
GLUCOSE BLD-MCNC: 150 MG/DL (ref 65–99)

## 2017-11-14 PROCEDURE — 700111 HCHG RX REV CODE 636 W/ 250 OVERRIDE (IP): Performed by: HOSPITALIST

## 2017-11-14 PROCEDURE — 700105 HCHG RX REV CODE 258: Performed by: HOSPITALIST

## 2017-11-14 PROCEDURE — 700102 HCHG RX REV CODE 250 W/ 637 OVERRIDE(OP): Performed by: INTERNAL MEDICINE

## 2017-11-14 PROCEDURE — 770006 HCHG ROOM/CARE - MED/SURG/GYN SEMI*

## 2017-11-14 PROCEDURE — A9270 NON-COVERED ITEM OR SERVICE: HCPCS | Performed by: HOSPITALIST

## 2017-11-14 PROCEDURE — 700102 HCHG RX REV CODE 250 W/ 637 OVERRIDE(OP): Performed by: HOSPITALIST

## 2017-11-14 PROCEDURE — A9270 NON-COVERED ITEM OR SERVICE: HCPCS | Performed by: INTERNAL MEDICINE

## 2017-11-14 PROCEDURE — 82962 GLUCOSE BLOOD TEST: CPT

## 2017-11-14 PROCEDURE — 700105 HCHG RX REV CODE 258

## 2017-11-14 PROCEDURE — 99232 SBSQ HOSP IP/OBS MODERATE 35: CPT | Performed by: HOSPITALIST

## 2017-11-14 RX ORDER — SODIUM CHLORIDE 9 MG/ML
INJECTION, SOLUTION INTRAVENOUS
Status: COMPLETED
Start: 2017-11-14 | End: 2017-11-14

## 2017-11-14 RX ADMIN — OXYCODONE HYDROCHLORIDE 10 MG: 10 TABLET ORAL at 20:59

## 2017-11-14 RX ADMIN — AMPICILLIN SODIUM AND SULBACTAM SODIUM 3 G: 2; 1 INJECTION, POWDER, FOR SOLUTION INTRAMUSCULAR; INTRAVENOUS at 11:09

## 2017-11-14 RX ADMIN — NYSTATIN 1500000 UNITS: 100000 POWDER TOPICAL at 21:01

## 2017-11-14 RX ADMIN — AMPICILLIN SODIUM AND SULBACTAM SODIUM 3 G: 2; 1 INJECTION, POWDER, FOR SOLUTION INTRAMUSCULAR; INTRAVENOUS at 05:41

## 2017-11-14 RX ADMIN — SODIUM CHLORIDE 500 ML: 9 INJECTION, SOLUTION INTRAVENOUS at 21:01

## 2017-11-14 RX ADMIN — OXYCODONE HYDROCHLORIDE 10 MG: 10 TABLET ORAL at 10:07

## 2017-11-14 RX ADMIN — POTASSIUM CHLORIDE 40 MEQ: 1500 TABLET, EXTENDED RELEASE ORAL at 07:54

## 2017-11-14 RX ADMIN — OXYCODONE HYDROCHLORIDE 10 MG: 10 TABLET ORAL at 02:55

## 2017-11-14 RX ADMIN — ENOXAPARIN SODIUM 40 MG: 100 INJECTION SUBCUTANEOUS at 07:54

## 2017-11-14 RX ADMIN — ONDANSETRON 4 MG: 4 TABLET, ORALLY DISINTEGRATING ORAL at 10:24

## 2017-11-14 RX ADMIN — METFORMIN HYDROCHLORIDE 500 MG: 500 TABLET, FILM COATED ORAL at 17:14

## 2017-11-14 RX ADMIN — AMPICILLIN SODIUM AND SULBACTAM SODIUM 3 G: 2; 1 INJECTION, POWDER, FOR SOLUTION INTRAMUSCULAR; INTRAVENOUS at 17:14

## 2017-11-14 RX ADMIN — NYSTATIN: 100000 POWDER TOPICAL at 09:00

## 2017-11-14 RX ADMIN — OXYCODONE HYDROCHLORIDE 10 MG: 10 TABLET ORAL at 14:26

## 2017-11-14 RX ADMIN — METFORMIN HYDROCHLORIDE 500 MG: 500 TABLET, FILM COATED ORAL at 07:54

## 2017-11-14 ASSESSMENT — ENCOUNTER SYMPTOMS
EYE REDNESS: 0
NECK PAIN: 0
VOMITING: 0
COUGH: 0
SENSORY CHANGE: 0
NAUSEA: 0
DIAPHORESIS: 0
WEAKNESS: 0
TINGLING: 1
DOUBLE VISION: 0
SHORTNESS OF BREATH: 0
SPUTUM PRODUCTION: 0
CHILLS: 0
CLAUDICATION: 0
EYE DISCHARGE: 0
MYALGIAS: 0
HEADACHES: 0
PND: 0
DIARRHEA: 0
DEPRESSION: 0
MEMORY LOSS: 0
ORTHOPNEA: 0
STRIDOR: 0
TREMORS: 0
BLURRED VISION: 0
ABDOMINAL PAIN: 0
EYE PAIN: 0
HEARTBURN: 0
WHEEZING: 0
PHOTOPHOBIA: 0
NERVOUS/ANXIOUS: 0
PALPITATIONS: 0
HEMOPTYSIS: 0
SPEECH CHANGE: 0
DIZZINESS: 0
BRUISES/BLEEDS EASILY: 0
BACK PAIN: 0
CONSTIPATION: 0
BLOOD IN STOOL: 0
FEVER: 0
SORE THROAT: 0

## 2017-11-14 ASSESSMENT — PAIN SCALES - GENERAL
PAINLEVEL_OUTOF10: 4
PAINLEVEL_OUTOF10: 6
PAINLEVEL_OUTOF10: 5
PAINLEVEL_OUTOF10: 4
PAINLEVEL_OUTOF10: 0
PAINLEVEL_OUTOF10: 4
PAINLEVEL_OUTOF10: 0
PAINLEVEL_OUTOF10: 6
PAINLEVEL_OUTOF10: 4
PAINLEVEL_OUTOF10: 7

## 2017-11-14 ASSESSMENT — LIFESTYLE VARIABLES: DO YOU DRINK ALCOHOL: NO

## 2017-11-14 NOTE — PROGRESS NOTES
[facility]    Date of Service: 2017    Chief Complaint  52 y.o. Male  admitted 11/10/2017 with right great toe swelling, pain . Dx Severe sepsis w Gangrene Rt great Toe- post  Right Great Toe Amputation 11-10-17     Interval Problem Update  Patient doing well clinically, no acute complaints. Patient denies fevers/chills, chest pain, shortness of breath or nausea/vommiting.   Continue wound care  continue IV Unasyn, cultures group B strep, ID following.    Consultants/Specialty  Orthopedic surgery Dr. Leo    Disposition  TBD        Review of Systems   Constitutional: Negative for chills, diaphoresis, fever and malaise/fatigue.   HENT: Negative for congestion, hearing loss, sore throat and tinnitus.    Eyes: Negative for blurred vision, double vision, photophobia, pain, discharge and redness.   Respiratory: Negative for cough, hemoptysis, sputum production, shortness of breath, wheezing and stridor.    Cardiovascular: Positive for leg swelling. Negative for chest pain, palpitations, orthopnea, claudication and PND.   Gastrointestinal: Negative for abdominal pain, blood in stool, constipation, heartburn, melena, nausea and vomiting.   Genitourinary: Negative for dysuria, frequency and urgency.   Musculoskeletal: Positive for joint pain. Negative for back pain, myalgias and neck pain.        Improved.   Skin: Negative for itching and rash.   Neurological: Positive for tingling (chronic in feet.). Negative for dizziness, tremors, sensory change, speech change, weakness and headaches.   Endo/Heme/Allergies: Does not bruise/bleed easily.   Psychiatric/Behavioral: Negative for depression, memory loss and suicidal ideas. The patient is not nervous/anxious.       Physical Exam  Laboratory/Imaging   Hemodynamics  Temp (24hrs), Av.8 °C (98.2 °F), Min:36.3 °C (97.4 °F), Max:37.6 °C (99.6 °F)   Temperature: 36.3 °C (97.4 °F)  Pulse  Av.9  Min: 71  Max: 120    Blood Pressure: 155/92       Respiratory      Respiration: 18, Pulse Oximetry: 93 %             Fluids    Intake/Output Summary (Last 24 hours) at 11/14/17 1542  Last data filed at 11/14/17 1000   Gross per 24 hour   Intake                0 ml   Output              980 ml   Net             -980 ml       Nutrition  Orders Placed This Encounter   Procedures   • Diet Order     Standing Status:   Standing     Number of Occurrences:   1     Order Specific Question:   Diet:     Answer:   Diabetic [3]     Physical Exam   Constitutional: He is oriented to person, place, and time. He appears well-developed and well-nourished. No distress.   HENT:   Head: Normocephalic and atraumatic.   Mouth/Throat: No oropharyngeal exudate.   Eyes: Conjunctivae and EOM are normal. Pupils are equal, round, and reactive to light. Right eye exhibits no discharge. No scleral icterus.   Neck: Neck supple. No JVD present. No thyromegaly present.   Cardiovascular: Normal rate, regular rhythm and intact distal pulses.    No murmur heard.  Pulmonary/Chest: Effort normal and breath sounds normal. No stridor. No respiratory distress. He has no wheezes. He has no rales.   Abdominal: Soft. Bowel sounds are normal. He exhibits no distension. There is no tenderness. There is no rebound.   Obese.    Musculoskeletal: Normal range of motion. He exhibits edema (trace b/l L/e edema) and tenderness (right toe area on palpation).   Right great Toe amputation no erythema, sutures in place dressing intact, dry and clean   Neurological: He is alert and oriented to person, place, and time. No cranial nerve deficit.   Skin: Skin is warm and dry. No rash noted. He is not diaphoretic. No erythema.   Psychiatric: He has a normal mood and affect. His behavior is normal. Thought content normal.   Nursing note and vitals reviewed.          Recent Labs      11/12/17   0334   SODIUM  135   POTASSIUM  3.7   CHLORIDE  101   CO2  27   GLUCOSE  151*   BUN  13   CREATININE  0.57   CALCIUM  8.3*                       Assessment/Plan     * Severe sepsis (CMS-HCC)- (present on admission)   Assessment & Plan    This is severe sepsis with organ dysfunction including musculoskeletal system  2/2 to right toe gangrene s/p amputation.  Cultures + group B Strep.  Continue IV unasyn per ID.  Wound care            Gangrene of toe of right foot (CMS-HCC)- (present on admission)   Assessment & Plan    Post Toe amputation 11-10-17    IV  Unasyn for cellulitis, PO augmentin on discharge? ID following.  Diabetes control  Wound care to continue        Hypokalemia- (present on admission)   Assessment & Plan    Resolved  Follow daily bmp        Hyperglycemia- (present on admission)   Assessment & Plan    Due to type II diabetes mellitus- newly dx.  HbA1c is  9.2  Continue Metformin  Continue Insulin-sliding scale, accu-checks and hypoglycemia protocol.  Diabetic Education        Class 3 obesity with serious comorbidity in adult   Assessment & Plan    Counseling provided on diet and exercise.         Hyponatremia- (present on admission)   Assessment & Plan    resolved        Candidal intertrigo- (present on admission)   Assessment & Plan    Nystatin powder affected areas             Reviewed items::  Labs reviewed, Medications reviewed and Radiology images reviewed  Zabala catheter::  No Zabala  DVT prophylaxis pharmacological::  Enoxaparin (Lovenox)  Ulcer Prophylaxis::  Not indicated  Antibiotics:  Treating active infection/contamination beyond 24 hours perioperative coverage

## 2017-11-14 NOTE — PROGRESS NOTES
Diabetes education: pt seen this afternoon. Please see consult note.  Plan: CDE will follow up tomorrow to complete education including meter and handout on resources. Please call [phone] if needs change.

## 2017-11-14 NOTE — CONSULTS
Diabetes education: Met with Sebastián, who is newly dx with diabetes.  Discussed what diabetes was, type two diabetes, goals for blood sugars, especially with healing wound, finger sticks, hyperglycemia, hypoglycemia, foot care and what effects blood sugars.  Discussed insulin and asked that he give his own insulin when needed in case he may need to go home on insulin.   Pt is currently on sliding scale coverage ac and hs with regular insulin. Blood sugars have been:11 /11: 210 (4 units), 154 (3 units), and 150, 11/12: 141, 159 (3 units), 144, and 124. 11/13:121, 151 (3 units and 132.  Discussed less costly oral agents and insulin as well as meters and strips.  CDE will give a meter tomorrow. If patient going home on oral agents he needs only to test once a day. Otherwise more frequently. Hg a1c was 9.2%.  Handouts given for review.  Plan: CDE will follow up tomorrow to complete education including meter and handout on resources. Please call [phone] if needs change.

## 2017-11-14 NOTE — PROGRESS NOTES
No events.  Fevers and WBC improving.  Pain controlled.    Exam  He has a palpable DP pulse  Proximal extent of incision with some edge necrosis, but intact  Erythema and cellulitis in the leg are improving    A/P: S/p right great toe amp for sepsis.  Palpable pedal pulse.    -TTWB on RLE in protective shoe  -LPS recs  -Monitor wound for now.  No need for further surgery.  These wounds frequently take extra time to heal.  -Abx per ID

## 2017-11-14 NOTE — PROGRESS NOTES
Infectious Disease Progress Note    Author: Cristy Holley M.D. Date & Time of service: 2017  11:56 AM    Chief Complaint:  Gangrene    Interval History:  52 y.o. WM admitted 11/10/2017 with right great toe gangrene    AF feeling better overall-pain controlled. Denies SE abx  Labs Reviewed, Medications Reviewed, Radiology Reviewed and Wound Reviewed.    Review of Systems:  Review of Systems   Constitutional: Negative for chills and fever.   Gastrointestinal: Negative for abdominal pain, diarrhea, nausea and vomiting.   Musculoskeletal: Positive for joint pain.   All other systems reviewed and are negative.      Hemodynamics:  Temp (24hrs), Av.6 °C (97.9 °F), Min:36.2 °C (97.2 °F), Max:37.6 °C (99.6 °F)  Temperature: 36.3 °C (97.4 °F)  Pulse  Av.9  Min: 71  Max: 120   Blood Pressure: 155/92       Physical Exam:  Physical Exam   Constitutional: He is oriented to person, place, and time. He appears well-developed. No distress.   Obese   HENT:   Head: Normocephalic and atraumatic.   Eyes: EOM are normal. Pupils are equal, round, and reactive to light.   Neck: Neck supple.   Cardiovascular: Normal rate and regular rhythm.    Pulmonary/Chest: Effort normal. No respiratory distress. He has no wheezes.   Abdominal: Soft. He exhibits no distension. There is no tenderness.   Musculoskeletal: He exhibits edema.   Serosanguinous drainage right great toe amp site-incisional necrosis   Neurological: He is alert and oriented to person, place, and time.   Nursing note and vitals reviewed.      Meds:    Current Facility-Administered Medications:   •  potassium chloride SA  •  metformin  •  oxycodone immediate-release **OR** oxycodone immediate-release  •  senna-docusate **AND** polyethylene glycol/lytes **AND** magnesium hydroxide **AND** bisacodyl  •  enoxaparin  •  insulin regular **AND** Accu-Chek ACHS **AND** NOTIFY MD and PharmD **AND** glucose 4 g **AND** dextrose 50%  •  ondansetron  •  ondansetron  •   promethazine  •  promethazine  •  nystatin  •  ampicillin-sulbactam (UNASYN) IV  •  morphine injection    Labs:  No results for input(s): WBC, RBC, HEMOGLOBIN, HEMATOCRIT, MCV, MCH, RDW, PLATELETCT, MPV, NEUTSPOLYS, LYMPHOCYTES, MONOCYTES, EOSINOPHILS, BASOPHILS, RBCMORPHOLO in the last 72 hours.  Recent Labs      11/12/17   0334   SODIUM  135   POTASSIUM  3.7   CHLORIDE  101   CO2  27   GLUCOSE  151*   BUN  13     Recent Labs      11/12/17   0334   CREATININE  0.57       Imaging:  Dx-chest-portable (1 View)    Result Date: 11/10/2017  11/10/2017 3:43 PM HISTORY/REASON FOR EXAM:  Ground-level fall. Weakness. Shortness of breath. TECHNIQUE/EXAM DESCRIPTION AND NUMBER OF VIEWS: Single portable view of the chest. COMPARISON: None FINDINGS: No pneumothorax identified. There is no evidence of focal consolidation or evidence of pulmonary edema. There is no evidence of pleural effusion. The heart is normal in size.     No pneumothorax identified.    Dx-foot-complete 3+ Right    Result Date: 11/10/2017  11/10/2017 3:43 PM HISTORY/REASON FOR EXAM:  Right great toe pain. Open wound in the right great toe region. TECHNIQUE/EXAM DESCRIPTION AND NUMBER OF VIEWS: 3 views of the RIGHT foot. COMPARISON:  None. FINDINGS: There is subcutaneous air or gas in the plantar aspect of the right 1st toe region distally consistent with the open wound. No underlying bony lytic or blastic change is noted to suggest osteomyelitis. There is flexion deformity and lateral deviation of the right 2nd through 5th MTP joints. There is no evidence of displaced fracture or dislocation.     1.  Soft tissue gas consistent with open wound in the plantar aspect of the distal medial aspect of the right 1st toe. 2.  No plain film evidence of osteomyelitis.    Le Art/shayan    Result Date: 11/13/2017   Vascular Laboratory  Conclusions  No prior study is available for comparison.  Bilateral.  No evidence of arterial insufficiency.            SHAYAN                                            TBI  Findings  Bilateral:  Doppler waveforms of the common femoral artery are of high amplitude and  triphasic.  Doppler waveforms at the ankle are brisk and triphasic.  Ankle-brachial indices are normal.  Victor M Villalobos M.D.  (Electronically Signed)  Final Date:      13 November 2017                   22:51      Micro:  Results     Procedure Component Value Units Date/Time    CULTURE TISSUE W/ GRM STAIN [[id number]]  (Abnormal) Collected:  11/10/17 1917    Order Status:  Completed Specimen:  Tissue Updated:  11/13/17 1733     Gram Stain Result -- (A)     Rare WBCs.  Few Gram positive cocci.       Significant Indicator POS (POS)     Source TISS     Site Periosteal Right Great Toe     Tissue Culture -- (A)     Tissue Culture -- (A)     Proteus mirabilis  Light growth  See previous culture for sensitivity report.       Tissue Culture -- (A)     Streptococcus agalactiae (Group B)  Moderate growth      ANAEROBIC CULTURE [[id number]] Collected:  11/10/17 1917    Order Status:  Completed Specimen:  Tissue Updated:  11/13/17 1733     Significant Indicator NEG     Source TISS     Site Periosteal Right Great Toe     Anaerobic Culture, Culture Res No Anaerobes isolated.    CULTURE TISSUE W/ GRM STAIN [[id number]]  (Abnormal)  (Susceptibility) Collected:  11/10/17 1917    Order Status:  Completed Specimen:  Tissue Updated:  11/13/17 1732     Significant Indicator POS (POS)     Source TISS     Site Right Great Toe Deep Tissue     Tissue Culture -- (A)     Gram Stain Result -- (A)     Few WBCs.  Few Gram positive cocci.  Rare Gram negative rods.       Tissue Culture -- (A)     Proteus mirabilis  Moderate growth       Tissue Culture -- (A)     Streptococcus agalactiae (Group B)  Moderate growth      Culture & Susceptibility     PROTEUS MIRABILIS     Antibiotic Sensitivity Microscan Unit Status    Ampicillin Sensitive <=8 mcg/mL Final    Cefepime Sensitive <=8 mcg/mL Final    Cefotaxime Sensitive <=2 mcg/mL  "Final    Cefotetan Sensitive <=16 mcg/mL Final    Ceftazidime Sensitive <=1 mcg/mL Final    Ceftriaxone Sensitive <=8 mcg/mL Final    Cefuroxime Sensitive <=4 mcg/mL Final    Ciprofloxacin Sensitive <=1 mcg/mL Final    Ertapenem Sensitive <=1 mcg/mL Final    Gentamicin Sensitive <=4 mcg/mL Final    Pip/Tazobactam Sensitive <=16 mcg/mL Final    Tobramycin Sensitive <=4 mcg/mL Final    Trimeth/Sulfa Sensitive <=2/38 mcg/mL Final                       ANAEROBIC CULTURE [[id number]] Collected:  11/10/17 1917    Order Status:  Completed Specimen:  Tissue Updated:  11/13/17 1732     Significant Indicator NEG     Source TISS     Site Right Great Toe Deep Tissue     Anaerobic Culture, Culture Res No Anaerobes isolated.    URINE CULTURE(NEW) [[id number]] Collected:  11/11/17 0509    Order Status:  Completed Specimen:  Urine Updated:  11/13/17 0912     Significant Indicator NEG     Source UR     Site --     Urine Culture No growth at 48 hours    Narrative:       Indication for culture:->Emergency Room Patient    BLOOD CULTURE [[id number]] Collected:  11/10/17 1452    Order Status:  Completed Specimen:  Blood from Peripheral Updated:  11/11/17 0904     Significant Indicator NEG     Source BLD     Site PERIPHERAL     Blood Culture --     No Growth    Note: Blood cultures are incubated for 5 days and  are monitored continuously.Positive blood cultures  are called to the RN and reported as soon as  they are identified.      Narrative:       Per Hospital Policy: Only change Specimen Src: to \"Line\" if  specified by physician order.    BLOOD CULTURE [[id number]] Collected:  11/10/17 1440    Order Status:  Completed Specimen:  Blood from Peripheral Updated:  11/11/17 0904     Significant Indicator NEG     Source BLD     Site PERIPHERAL     Blood Culture --     No Growth    Note: Blood cultures are incubated for 5 days and  are monitored continuously.Positive blood cultures  are called to the RN and reported as soon as  they are " "identified.      Narrative:       Per Hospital Policy: Only change Specimen Src: to \"Line\" if  specified by physician order.    GRAM STAIN [[id number]] Collected:  11/10/17 1917    Order Status:  Completed Specimen:  Tissue Updated:  11/11/17 0845     Significant Indicator .     Source TISS     Site Periosteal Right Great Toe     Gram Stain Result --     Rare WBCs.  Few Gram positive cocci.      GRAM STAIN [[id number]] Collected:  11/10/17 1917    Order Status:  Completed Specimen:  Tissue Updated:  11/11/17 0845     Significant Indicator .     Source TISS     Site Right Great Toe Deep Tissue     Gram Stain Result --     Few WBCs.  Few Gram positive cocci.  Rare Gram negative rods.      URINALYSIS [[id number]]  (Abnormal) Collected:  11/11/17 0509    Order Status:  Completed Specimen:  Urine Updated:  11/11/17 0533     Color DK Yellow     Character Cloudy (A)     Specific Gravity 1.024     Ph 5.0     Glucose Negative mg/dL      Ketones Negative mg/dL      Protein 30 (A) mg/dL      Bilirubin Small (A)     Urobilinogen, Urine 1.0     Nitrite Negative     Leukocyte Esterase Negative     Occult Blood Negative     Micro Urine Req Microscopic    Narrative:       Indication for culture:->Emergency Room Patient    URINALYSIS [[id number]]     Order Status:  Canceled Specimen:  Urine     URINE CULTURE(NEW) [[id number]] Collected:  11/10/17 0000    Order Status:  Canceled Specimen:  Urine     BLOOD CULTURE [[id number]] Collected:  11/10/17 0000    Order Status:  Canceled Specimen:  Other from Peripheral     BLOOD CULTURE [[id number]] Collected:  11/10/17 0000    Order Status:  Canceled Specimen:  Other from Peripheral           Assessment:  Active Hospital Problems    Diagnosis   • *Severe sepsis (CMS-HCC) [A41.9, R65.20]   • Gangrene of toe of right foot (CMS-[facility]) [I96]   • Candidal intertrigo [B37.2]   • Hyponatremia [E87.1]   • Class 3 obesity with serious comorbidity in adult [E66.9]   • Hyperglycemia [R73.9]   • Hypokalemia " [E87.6]       Plan:  Sepsis (CMS-HCC)- improved  Due to gangrene-s/p amp  Fever resolved  Leukocytosis resolved  Lactic acidosis resolved  Continue IV Unasyn.  Blood cxs neg 11/11      Gangrene of toe of right foot (CMS-HCC)-   S/p right great toe amputation 11-10-17   Cxs are group B strep and  IV  Unasyn for cellulitis-- anticipate can change to oral Augmentin at discharge if path shows clear margins  Hold PICC placement for now  Wound care to continue      Diabetes- (present on admission)  Keep BS under 150 to help control current infection  HbA1c is  9.2    Discussed with internal medicine.

## 2017-11-14 NOTE — CARE PLAN
Problem: Safety  Goal: Will remain free from injury  Outcome: PROGRESSING AS EXPECTED  Call light within reach. Pt calls for assistance at all times.  Bed in lowest position, upper bedrails up, personal possessions within reach, treaded socks on.  Hourly rounding in place.          Problem: Pain Management  Goal: Pain level will decrease to patient's comfort goal  Outcome: PROGRESSING AS EXPECTED  10mg Oxycodone PRN given per MAR with adequate pain relief.

## 2017-11-15 ENCOUNTER — APPOINTMENT (OUTPATIENT)
Dept: RADIOLOGY | Facility: MEDICAL CENTER | Age: 52
DRG: 854 | End: 2017-11-15
Attending: INTERNAL MEDICINE
Payer: MEDICAID

## 2017-11-15 PROBLEM — E83.42 HYPOMAGNESEMIA: Status: ACTIVE | Noted: 2017-11-15

## 2017-11-15 LAB
ALBUMIN SERPL BCP-MCNC: 2.6 G/DL (ref 3.2–4.9)
ALBUMIN/GLOB SERPL: 0.7 G/DL
ALP SERPL-CCNC: 66 U/L (ref 30–99)
ALT SERPL-CCNC: 13 U/L (ref 2–50)
ANION GAP SERPL CALC-SCNC: 6 MMOL/L (ref 0–11.9)
AST SERPL-CCNC: 19 U/L (ref 12–45)
BACTERIA BLD CULT: NORMAL
BACTERIA BLD CULT: NORMAL
BASOPHILS # BLD AUTO: 0.6 % (ref 0–1.8)
BASOPHILS # BLD: 0.06 K/UL (ref 0–0.12)
BILIRUB SERPL-MCNC: 0.6 MG/DL (ref 0.1–1.5)
BUN SERPL-MCNC: 11 MG/DL (ref 8–22)
CALCIUM SERPL-MCNC: 7.9 MG/DL (ref 8.5–10.5)
CHLORIDE SERPL-SCNC: 102 MMOL/L (ref 96–112)
CO2 SERPL-SCNC: 25 MMOL/L (ref 20–33)
CREAT SERPL-MCNC: 0.45 MG/DL (ref 0.5–1.4)
EOSINOPHIL # BLD AUTO: 0.26 K/UL (ref 0–0.51)
EOSINOPHIL NFR BLD: 2.8 % (ref 0–6.9)
ERYTHROCYTE [DISTWIDTH] IN BLOOD BY AUTOMATED COUNT: 40.7 FL (ref 35.9–50)
GFR SERPL CREATININE-BSD FRML MDRD: >60 ML/MIN/1.73 M 2
GLOBULIN SER CALC-MCNC: 4 G/DL (ref 1.9–3.5)
GLUCOSE BLD-MCNC: 120 MG/DL (ref 65–99)
GLUCOSE BLD-MCNC: 124 MG/DL (ref 65–99)
GLUCOSE BLD-MCNC: 133 MG/DL (ref 65–99)
GLUCOSE BLD-MCNC: 143 MG/DL (ref 65–99)
GLUCOSE SERPL-MCNC: 135 MG/DL (ref 65–99)
HCT VFR BLD AUTO: 42.2 % (ref 42–52)
HGB BLD-MCNC: 14 G/DL (ref 14–18)
IMM GRANULOCYTES # BLD AUTO: 0.11 K/UL (ref 0–0.11)
IMM GRANULOCYTES NFR BLD AUTO: 1.2 % (ref 0–0.9)
LYMPHOCYTES # BLD AUTO: 2.82 K/UL (ref 1–4.8)
LYMPHOCYTES NFR BLD: 29.9 % (ref 22–41)
MAGNESIUM SERPL-MCNC: 1.4 MG/DL (ref 1.5–2.5)
MCH RBC QN AUTO: 27.8 PG (ref 27–33)
MCHC RBC AUTO-ENTMCNC: 33.2 G/DL (ref 33.7–35.3)
MCV RBC AUTO: 83.9 FL (ref 81.4–97.8)
MONOCYTES # BLD AUTO: 0.74 K/UL (ref 0–0.85)
MONOCYTES NFR BLD AUTO: 7.8 % (ref 0–13.4)
NEUTROPHILS # BLD AUTO: 5.45 K/UL (ref 1.82–7.42)
NEUTROPHILS NFR BLD: 57.7 % (ref 44–72)
NRBC # BLD AUTO: 0 K/UL
NRBC BLD AUTO-RTO: 0 /100 WBC
PLATELET # BLD AUTO: 297 K/UL (ref 164–446)
PMV BLD AUTO: 9.9 FL (ref 9–12.9)
POTASSIUM SERPL-SCNC: 3.8 MMOL/L (ref 3.6–5.5)
PROT SERPL-MCNC: 6.6 G/DL (ref 6–8.2)
RBC # BLD AUTO: 5.03 M/UL (ref 4.7–6.1)
SIGNIFICANT IND 70042: NORMAL
SIGNIFICANT IND 70042: NORMAL
SITE SITE: NORMAL
SITE SITE: NORMAL
SODIUM SERPL-SCNC: 133 MMOL/L (ref 135–145)
SOURCE SOURCE: NORMAL
SOURCE SOURCE: NORMAL
WBC # BLD AUTO: 9.4 K/UL (ref 4.8–10.8)

## 2017-11-15 PROCEDURE — A9270 NON-COVERED ITEM OR SERVICE: HCPCS | Performed by: INTERNAL MEDICINE

## 2017-11-15 PROCEDURE — 700102 HCHG RX REV CODE 250 W/ 637 OVERRIDE(OP): Performed by: INTERNAL MEDICINE

## 2017-11-15 PROCEDURE — 82962 GLUCOSE BLOOD TEST: CPT | Mod: 91

## 2017-11-15 PROCEDURE — 700111 HCHG RX REV CODE 636 W/ 250 OVERRIDE (IP): Performed by: HOSPITALIST

## 2017-11-15 PROCEDURE — 36415 COLL VENOUS BLD VENIPUNCTURE: CPT

## 2017-11-15 PROCEDURE — 700105 HCHG RX REV CODE 258: Performed by: HOSPITALIST

## 2017-11-15 PROCEDURE — 99232 SBSQ HOSP IP/OBS MODERATE 35: CPT | Performed by: HOSPITALIST

## 2017-11-15 PROCEDURE — 36569 INSJ PICC 5 YR+ W/O IMAGING: CPT

## 2017-11-15 PROCEDURE — 85025 COMPLETE CBC W/AUTO DIFF WBC: CPT

## 2017-11-15 PROCEDURE — A9270 NON-COVERED ITEM OR SERVICE: HCPCS | Performed by: HOSPITALIST

## 2017-11-15 PROCEDURE — 80053 COMPREHEN METABOLIC PANEL: CPT

## 2017-11-15 PROCEDURE — 700102 HCHG RX REV CODE 250 W/ 637 OVERRIDE(OP): Performed by: HOSPITALIST

## 2017-11-15 PROCEDURE — 770006 HCHG ROOM/CARE - MED/SURG/GYN SEMI*

## 2017-11-15 PROCEDURE — 83735 ASSAY OF MAGNESIUM: CPT

## 2017-11-15 RX ORDER — MAGNESIUM SULFATE HEPTAHYDRATE 40 MG/ML
2 INJECTION, SOLUTION INTRAVENOUS ONCE
Status: COMPLETED | OUTPATIENT
Start: 2017-11-15 | End: 2017-11-15

## 2017-11-15 RX ADMIN — AMPICILLIN SODIUM AND SULBACTAM SODIUM 3 G: 2; 1 INJECTION, POWDER, FOR SOLUTION INTRAMUSCULAR; INTRAVENOUS at 11:17

## 2017-11-15 RX ADMIN — NYSTATIN 1500000 UNITS: 100000 POWDER TOPICAL at 20:56

## 2017-11-15 RX ADMIN — OXYCODONE HYDROCHLORIDE 10 MG: 10 TABLET ORAL at 01:00

## 2017-11-15 RX ADMIN — METFORMIN HYDROCHLORIDE 500 MG: 500 TABLET, FILM COATED ORAL at 08:13

## 2017-11-15 RX ADMIN — AMPICILLIN SODIUM AND SULBACTAM SODIUM 3 G: 2; 1 INJECTION, POWDER, FOR SOLUTION INTRAMUSCULAR; INTRAVENOUS at 17:00

## 2017-11-15 RX ADMIN — ENOXAPARIN SODIUM 40 MG: 100 INJECTION SUBCUTANEOUS at 08:12

## 2017-11-15 RX ADMIN — AMPICILLIN SODIUM AND SULBACTAM SODIUM 3 G: 2; 1 INJECTION, POWDER, FOR SOLUTION INTRAMUSCULAR; INTRAVENOUS at 00:00

## 2017-11-15 RX ADMIN — OXYCODONE HYDROCHLORIDE 5 MG: 5 TABLET ORAL at 06:26

## 2017-11-15 RX ADMIN — AMPICILLIN SODIUM AND SULBACTAM SODIUM 3 G: 2; 1 INJECTION, POWDER, FOR SOLUTION INTRAMUSCULAR; INTRAVENOUS at 05:35

## 2017-11-15 RX ADMIN — NYSTATIN 1500000 UNITS: 100000 POWDER TOPICAL at 08:13

## 2017-11-15 RX ADMIN — OXYCODONE HYDROCHLORIDE 10 MG: 10 TABLET ORAL at 15:27

## 2017-11-15 RX ADMIN — OXYCODONE HYDROCHLORIDE 10 MG: 10 TABLET ORAL at 20:55

## 2017-11-15 RX ADMIN — POTASSIUM CHLORIDE 40 MEQ: 1500 TABLET, EXTENDED RELEASE ORAL at 08:13

## 2017-11-15 RX ADMIN — OXYCODONE HYDROCHLORIDE 10 MG: 10 TABLET ORAL at 11:12

## 2017-11-15 RX ADMIN — MAGNESIUM SULFATE IN WATER 2 G: 40 INJECTION, SOLUTION INTRAVENOUS at 09:04

## 2017-11-15 RX ADMIN — METFORMIN HYDROCHLORIDE 500 MG: 500 TABLET, FILM COATED ORAL at 17:00

## 2017-11-15 ASSESSMENT — PAIN SCALES - GENERAL
PAINLEVEL_OUTOF10: 5
PAINLEVEL_OUTOF10: 7
PAINLEVEL_OUTOF10: 5
PAINLEVEL_OUTOF10: 5
PAINLEVEL_OUTOF10: 7
PAINLEVEL_OUTOF10: 3

## 2017-11-15 ASSESSMENT — ENCOUNTER SYMPTOMS
NERVOUS/ANXIOUS: 0
DIAPHORESIS: 0
WHEEZING: 0
CLAUDICATION: 0
HEADACHES: 0
SHORTNESS OF BREATH: 0
TINGLING: 1
EYE PAIN: 0
STRIDOR: 0
BLOOD IN STOOL: 0
COUGH: 0
WEAKNESS: 0
MYALGIAS: 0
PALPITATIONS: 0
MEMORY LOSS: 0
DOUBLE VISION: 0
SPUTUM PRODUCTION: 0
ABDOMINAL PAIN: 0
BLURRED VISION: 0
TREMORS: 0
EYE REDNESS: 0
HEARTBURN: 0
CHILLS: 0
SPEECH CHANGE: 0
NECK PAIN: 0
BACK PAIN: 0
PHOTOPHOBIA: 0
DIZZINESS: 0
FEVER: 0
SENSORY CHANGE: 0
CONSTIPATION: 0
NAUSEA: 0
DEPRESSION: 0
SORE THROAT: 0
VOMITING: 0
HEMOPTYSIS: 0
ORTHOPNEA: 0
PND: 0
EYE DISCHARGE: 0
DIARRHEA: 0
BRUISES/BLEEDS EASILY: 0

## 2017-11-15 ASSESSMENT — COPD QUESTIONNAIRES
HAVE YOU SMOKED AT LEAST 100 CIGARETTES IN YOUR ENTIRE LIFE: NO/DON'T KNOW
DO YOU EVER COUGH UP ANY MUCUS OR PHLEGM?: NO/ONLY WITH OCCASIONAL COLDS OR INFECTIONS
DURING THE PAST 4 WEEKS HOW MUCH DID YOU FEEL SHORT OF BREATH: NONE/LITTLE OF THE TIME
COPD SCREENING SCORE: 1

## 2017-11-15 ASSESSMENT — LIFESTYLE VARIABLES: DO YOU DRINK ALCOHOL: NO

## 2017-11-15 NOTE — PROGRESS NOTES
Infectious Disease Progress Note    Author: Cristy Holley M.D. Date & Time of service: 11/15/2017  11:32 AM    Chief Complaint:  Gangrene    Interval History:  52 y.o. WM admitted 11/10/2017 with right great toe gangrene    AF feeling better overall-pain controlled. Denies SE abx  11/15 AF tolerating abx well-continuing to ooze from wound  Labs Reviewed, Medications Reviewed, Radiology Reviewed and Wound Reviewed.    Review of Systems:  Review of Systems   Constitutional: Negative for chills and fever.   Gastrointestinal: Negative for abdominal pain, diarrhea, nausea and vomiting.   Musculoskeletal: Positive for joint pain.   All other systems reviewed and are negative.      Hemodynamics:  Temp (24hrs), Av.7 °C (98 °F), Min:36.5 °C (97.7 °F), Max:36.8 °C (98.3 °F)  Temperature: 36.5 °C (97.7 °F)  Pulse  Av.2  Min: 70  Max: 120   Blood Pressure: 146/72       Physical Exam:  Physical Exam   Constitutional: He is oriented to person, place, and time. He appears well-developed. No distress.   Obese   HENT:   Head: Normocephalic and atraumatic.   Eyes: EOM are normal. Pupils are equal, round, and reactive to light.   Neck: Neck supple.   Cardiovascular: Normal rate and regular rhythm.    Pulmonary/Chest: Effort normal. No respiratory distress. He has no wheezes.   Abdominal: Soft. He exhibits no distension. There is no tenderness.   Musculoskeletal: He exhibits edema.   Serosanguinous drainage right great toe amp site-incisional necrosis   Neurological: He is alert and oriented to person, place, and time.   Nursing note and vitals reviewed.      Meds:    Current Facility-Administered Medications:   •  potassium chloride SA  •  metformin  •  oxycodone immediate-release **OR** oxycodone immediate-release  •  senna-docusate **AND** polyethylene glycol/lytes **AND** magnesium hydroxide **AND** bisacodyl  •  enoxaparin  •  insulin regular **AND** Accu-Chek ACHS **AND** NOTIFY MD and PharmD **AND** glucose 4  g **AND** dextrose 50%  •  ondansetron  •  ondansetron  •  promethazine  •  promethazine  •  nystatin  •  ampicillin-sulbactam (UNASYN) IV  •  morphine injection    Labs:  Recent Labs      11/15/17   0339   WBC  9.4   RBC  5.03   HEMOGLOBIN  14.0   HEMATOCRIT  42.2   MCV  83.9   MCH  27.8   RDW  40.7   PLATELETCT  297   MPV  9.9   NEUTSPOLYS  57.70   LYMPHOCYTES  29.90   MONOCYTES  7.80   EOSINOPHILS  2.80   BASOPHILS  0.60     Recent Labs      11/15/17   0339   SODIUM  133*   POTASSIUM  3.8   CHLORIDE  102   CO2  25   GLUCOSE  135*   BUN  11     Recent Labs      11/15/17   0339   ALBUMIN  2.6*   TBILIRUBIN  0.6   ALKPHOSPHAT  66   TOTPROTEIN  6.6   ALTSGPT  13   ASTSGOT  19   CREATININE  0.45*       Imaging:  Dx-chest-portable (1 View)    Result Date: 11/10/2017  11/10/2017 3:43 PM HISTORY/REASON FOR EXAM:  Ground-level fall. Weakness. Shortness of breath. TECHNIQUE/EXAM DESCRIPTION AND NUMBER OF VIEWS: Single portable view of the chest. COMPARISON: None FINDINGS: No pneumothorax identified. There is no evidence of focal consolidation or evidence of pulmonary edema. There is no evidence of pleural effusion. The heart is normal in size.     No pneumothorax identified.    Dx-foot-complete 3+ Right    Result Date: 11/10/2017  11/10/2017 3:43 PM HISTORY/REASON FOR EXAM:  Right great toe pain. Open wound in the right great toe region. TECHNIQUE/EXAM DESCRIPTION AND NUMBER OF VIEWS: 3 views of the RIGHT foot. COMPARISON:  None. FINDINGS: There is subcutaneous air or gas in the plantar aspect of the right 1st toe region distally consistent with the open wound. No underlying bony lytic or blastic change is noted to suggest osteomyelitis. There is flexion deformity and lateral deviation of the right 2nd through 5th MTP joints. There is no evidence of displaced fracture or dislocation.     1.  Soft tissue gas consistent with open wound in the plantar aspect of the distal medial aspect of the right 1st toe. 2.  No plain film  evidence of osteomyelitis.    Le Art/shayan    Result Date: 11/13/2017   Vascular Laboratory  Conclusions  No prior study is available for comparison.  Bilateral.  No evidence of arterial insufficiency.            SHAYAN                                           TBI  Findings  Bilateral:  Doppler waveforms of the common femoral artery are of high amplitude and  triphasic.  Doppler waveforms at the ankle are brisk and triphasic.  Ankle-brachial indices are normal.  Victor M Villalobos M.D.  (Electronically Signed)  Final Date:      13 November 2017                   22:51      Micro:  Results     Procedure Component Value Units Date/Time    CULTURE TISSUE W/ GRM STAIN [[id number]]  (Abnormal) Collected:  11/10/17 1917    Order Status:  Completed Specimen:  Tissue Updated:  11/13/17 1733     Gram Stain Result -- (A)     Rare WBCs.  Few Gram positive cocci.       Significant Indicator POS (POS)     Source TISS     Site Periosteal Right Great Toe     Tissue Culture -- (A)     Tissue Culture -- (A)     Proteus mirabilis  Light growth  See previous culture for sensitivity report.       Tissue Culture -- (A)     Streptococcus agalactiae (Group B)  Moderate growth      ANAEROBIC CULTURE [[id number]] Collected:  11/10/17 1917    Order Status:  Completed Specimen:  Tissue Updated:  11/13/17 1733     Significant Indicator NEG     Source TISS     Site Periosteal Right Great Toe     Anaerobic Culture, Culture Res No Anaerobes isolated.    CULTURE TISSUE W/ GRM STAIN [[id number]]  (Abnormal)  (Susceptibility) Collected:  11/10/17 1917    Order Status:  Completed Specimen:  Tissue Updated:  11/13/17 1732     Significant Indicator POS (POS)     Source TISS     Site Right Great Toe Deep Tissue     Tissue Culture -- (A)     Gram Stain Result -- (A)     Few WBCs.  Few Gram positive cocci.  Rare Gram negative rods.       Tissue Culture -- (A)     Proteus mirabilis  Moderate growth       Tissue Culture -- (A)     Streptococcus agalactiae (Group  "B)  Moderate growth      Culture & Susceptibility     PROTEUS MIRABILIS     Antibiotic Sensitivity Microscan Unit Status    Ampicillin Sensitive <=8 mcg/mL Final    Cefepime Sensitive <=8 mcg/mL Final    Cefotaxime Sensitive <=2 mcg/mL Final    Cefotetan Sensitive <=16 mcg/mL Final    Ceftazidime Sensitive <=1 mcg/mL Final    Ceftriaxone Sensitive <=8 mcg/mL Final    Cefuroxime Sensitive <=4 mcg/mL Final    Ciprofloxacin Sensitive <=1 mcg/mL Final    Ertapenem Sensitive <=1 mcg/mL Final    Gentamicin Sensitive <=4 mcg/mL Final    Pip/Tazobactam Sensitive <=16 mcg/mL Final    Tobramycin Sensitive <=4 mcg/mL Final    Trimeth/Sulfa Sensitive <=2/38 mcg/mL Final                       ANAEROBIC CULTURE [[id number]] Collected:  11/10/17 1917    Order Status:  Completed Specimen:  Tissue Updated:  11/13/17 1732     Significant Indicator NEG     Source TISS     Site Right Great Toe Deep Tissue     Anaerobic Culture, Culture Res No Anaerobes isolated.    URINE CULTURE(NEW) [[id number]] Collected:  11/11/17 0509    Order Status:  Completed Specimen:  Urine Updated:  11/13/17 0912     Significant Indicator NEG     Source UR     Site --     Urine Culture No growth at 48 hours    Narrative:       Indication for culture:->Emergency Room Patient    BLOOD CULTURE [[id number]] Collected:  11/10/17 1452    Order Status:  Completed Specimen:  Blood from Peripheral Updated:  11/11/17 0904     Significant Indicator NEG     Source BLD     Site PERIPHERAL     Blood Culture --     No Growth    Note: Blood cultures are incubated for 5 days and  are monitored continuously.Positive blood cultures  are called to the RN and reported as soon as  they are identified.      Narrative:       Per Hospital Policy: Only change Specimen Src: to \"Line\" if  specified by physician order.    BLOOD CULTURE [[id number]] Collected:  11/10/17 1440    Order Status:  Completed Specimen:  Blood from Peripheral Updated:  11/11/17 0904     Significant Indicator NEG     " "Source BLD     Site PERIPHERAL     Blood Culture --     No Growth    Note: Blood cultures are incubated for 5 days and  are monitored continuously.Positive blood cultures  are called to the RN and reported as soon as  they are identified.      Narrative:       Per Hospital Policy: Only change Specimen Src: to \"Line\" if  specified by physician order.    GRAM STAIN [[id number]] Collected:  11/10/17 1917    Order Status:  Completed Specimen:  Tissue Updated:  11/11/17 0845     Significant Indicator .     Source TISS     Site Periosteal Right Great Toe     Gram Stain Result --     Rare WBCs.  Few Gram positive cocci.      GRAM STAIN [[id number]] Collected:  11/10/17 1917    Order Status:  Completed Specimen:  Tissue Updated:  11/11/17 0845     Significant Indicator .     Source TISS     Site Right Great Toe Deep Tissue     Gram Stain Result --     Few WBCs.  Few Gram positive cocci.  Rare Gram negative rods.      URINALYSIS [[id number]]  (Abnormal) Collected:  11/11/17 0509    Order Status:  Completed Specimen:  Urine Updated:  11/11/17 0533     Color DK Yellow     Character Cloudy (A)     Specific Gravity 1.024     Ph 5.0     Glucose Negative mg/dL      Ketones Negative mg/dL      Protein 30 (A) mg/dL      Bilirubin Small (A)     Urobilinogen, Urine 1.0     Nitrite Negative     Leukocyte Esterase Negative     Occult Blood Negative     Micro Urine Req Microscopic    Narrative:       Indication for culture:->Emergency Room Patient    URINALYSIS [[id number]]     Order Status:  Canceled Specimen:  Urine     URINE CULTURE(NEW) [[id number]] Collected:  11/10/17 0000    Order Status:  Canceled Specimen:  Urine     BLOOD CULTURE [[id number]] Collected:  11/10/17 0000    Order Status:  Canceled Specimen:  Other from Peripheral     BLOOD CULTURE [[id number]] Collected:  11/10/17 0000    Order Status:  Canceled Specimen:  Other from Peripheral           Assessment:  Active Hospital Problems    Diagnosis   • *Severe sepsis (CMS-HCC) " [A41.9, R65.20]   • Gangrene of toe of right foot (CMS-HCC) [I96]   • Candidal intertrigo [B37.2]   • Hyponatremia [E87.1]   • Class 3 obesity with serious comorbidity in adult [E66.9]   • Hyperglycemia [R73.9]   • Hypokalemia [E87.6]       Plan:  Sepsis (CMS-HCC)- improved  Due to gangrene-s/p amp  Fever resolved  Leukocytosis resolved  Lactic acidosis resolved  Continue IV Unasyn.  Blood cxs neg 11/11      Gangrene of toe of right foot (CMS-HCC)-   S/p right great toe amputation 11-10-17   Cxs are group B strep and   Proteus  Path shows ulceration to margin  Order PICC placement   Plan for 6 weeks IV abx- ertapenem  Stop date 12/22/2017  Wound care to continue      Diabetes- (present on admission)  Keep BS under 150 to help control current infection  HbA1c is  9.2    Discussed with internal medicine.

## 2017-11-15 NOTE — PROGRESS NOTES
Dressing change completed. Pt tolerated well. Denies any complains . Sutures to incision site present. Surrounding area red and swollen. Tender to touch. Cleaned with Ns. Covered with Adaptic and gauze. Wrapped with Kerlex dressing. Will continue to monitor

## 2017-11-15 NOTE — PROGRESS NOTES
[facility] Progress Note    Date of Service: 11/15/2017    Chief Complaint  52 y.o. Male  admitted 11/10/2017 with right great toe swelling, pain . Dx Severe sepsis w Gangrene Rt great Toe- post  Right Great Toe Amputation 11-10-17     Interval Problem Update  Patient doing well clinically, no acute complaints. Patient denies fevers/chills, chest pain, shortness of breath or nausea/vommiting.   Continue wound care  continue IV Unasyn, cultures group B strep, ID following.    11/15  Patient doing well overall, no gross changes clinically.  Continue IV Unasyn.  ID following,   Continue wound care.    Consultants/Specialty  Orthopedic surgery Dr. Leo    Disposition  TBD        Review of Systems   Constitutional: Negative for chills, diaphoresis, fever and malaise/fatigue.   HENT: Negative for congestion, hearing loss, sore throat and tinnitus.    Eyes: Negative for blurred vision, double vision, photophobia, pain, discharge and redness.   Respiratory: Negative for cough, hemoptysis, sputum production, shortness of breath, wheezing and stridor.    Cardiovascular: Positive for leg swelling. Negative for chest pain, palpitations, orthopnea, claudication and PND.   Gastrointestinal: Negative for abdominal pain, blood in stool, constipation, heartburn, melena, nausea and vomiting.   Genitourinary: Negative for dysuria, frequency and urgency.   Musculoskeletal: Positive for joint pain. Negative for back pain, myalgias and neck pain.        Improved.   Skin: Negative for itching and rash.   Neurological: Positive for tingling (chronic in feet.). Negative for dizziness, tremors, sensory change, speech change, weakness and headaches.   Endo/Heme/Allergies: Does not bruise/bleed easily.   Psychiatric/Behavioral: Negative for depression, memory loss and suicidal ideas. The patient is not nervous/anxious.       Physical Exam  Laboratory/Imaging   Hemodynamics  Temp (24hrs), Av.7 °C (98 °F), Min:36.5 °C (97.7 °F),  Max:36.8 °C (98.3 °F)   Temperature: 36.5 °C (97.7 °F)  Pulse  Av.2  Min: 70  Max: 120    Blood Pressure: 146/72      Respiratory      Respiration: 16, Pulse Oximetry: 91 %             Fluids    Intake/Output Summary (Last 24 hours) at 11/15/17 1127  Last data filed at 17 2000   Gross per 24 hour   Intake                0 ml   Output              200 ml   Net             -200 ml       Nutrition  Orders Placed This Encounter   Procedures   • Diet Order     Standing Status:   Standing     Number of Occurrences:   1     Order Specific Question:   Diet:     Answer:   Diabetic [3]     Physical Exam   Constitutional: He is oriented to person, place, and time. He appears well-developed and well-nourished. No distress.   HENT:   Head: Normocephalic and atraumatic.   Mouth/Throat: No oropharyngeal exudate.   Eyes: Conjunctivae and EOM are normal. Pupils are equal, round, and reactive to light. Right eye exhibits no discharge. No scleral icterus.   Neck: Neck supple. No JVD present. No thyromegaly present.   Cardiovascular: Normal rate, regular rhythm and intact distal pulses.    No murmur heard.  Pulmonary/Chest: Effort normal and breath sounds normal. No stridor. No respiratory distress. He has no wheezes. He has no rales.   Abdominal: Soft. Bowel sounds are normal. He exhibits no distension. There is no tenderness. There is no rebound.   Obese.    Musculoskeletal: Normal range of motion. He exhibits edema (trace b/l L/e edema) and tenderness (right toe area on palpation, improving).   Right great Toe amputation no erythema, sutures in place dressing intact, dry and clean   Neurological: He is alert and oriented to person, place, and time. No cranial nerve deficit.   Skin: Skin is warm and dry. No rash noted. He is not diaphoretic. No erythema.   Psychiatric: He has a normal mood and affect. His behavior is normal. Thought content normal.   Nursing note and vitals reviewed.      Recent Labs      11/15/17   6642    WBC  9.4   RBC  5.03   HEMOGLOBIN  14.0   HEMATOCRIT  42.2   MCV  83.9   MCH  27.8   MCHC  33.2*   RDW  40.7   PLATELETCT  297   MPV  9.9     Recent Labs      11/15/17   0339   SODIUM  133*   POTASSIUM  3.8   CHLORIDE  102   CO2  25   GLUCOSE  135*   BUN  11   CREATININE  0.45*   CALCIUM  7.9*                      Assessment/Plan     * Severe sepsis (CMS-HCC)- (present on admission)   Assessment & Plan    This is severe sepsis with organ dysfunction including musculoskeletal system  2/2 to right toe gangrene s/p amputation.  Cultures + group B Strep.  Continue IV unasyn per ID. Pending further recommendations PO antibiotics?  Wound care            Gangrene of toe of right foot (CMS-HCC)- (present on admission)   Assessment & Plan    Post Toe amputation 11-10-17   Continue IV Unasyn for cellulitis, PO augmentin on discharge?   ID following.  Strict Diabetes control  Wound care to continue        Hypokalemia- (present on admission)   Assessment & Plan    Resolved  Follow daily bmp        Hyperglycemia- (present on admission)   Assessment & Plan    Due to type II diabetes mellitus- newly dx.  HbA1c is  9.2  Continue Metformin  Continue Insulin-sliding scale, accu-checks and hypoglycemia protocol.  Diabetic Education        Class 3 obesity with serious comorbidity in adult   Assessment & Plan    Counseling provided on diet and exercise.         Hyponatremia- (present on admission)   Assessment & Plan    Na:133, continue to monitor. encouarge PO intake.      Candidal intertrigo- (present on admission)   Assessment & Plan    Nystatin powder affected areas        Hypomagnesemia  - Replenish lytes with 2g of Magnesium Sulfate  - Recheck magnesium levels in the am for changes.     Patient plan of care discussed at multidisplinary team rounds and with patient and R.N at [facility].         Reviewed items::  Labs reviewed, Medications reviewed and Radiology images reviewed  Zabala catheter::  No Zabala  DVT prophylaxis  pharmacological::  Enoxaparin (Lovenox)  Ulcer Prophylaxis::  Not indicated  Antibiotics:  Treating active infection/contamination beyond 24 hours perioperative coverage

## 2017-11-15 NOTE — PROGRESS NOTES
" Subjective:    Patient denies chest pain, calf pain, shortness of breath.  Pain is currently under control.    Objective:    Alert and oriented x 3  Afebrile  Blood pressure 137/79, pulse 70, temperature 36.7 °C (98 °F), resp. rate 16, height 1.829 m (6' 0.01\"), weight (!) 141.5 kg (311 lb 15.2 oz), SpO2 92 %.    Recent Labs      11/15/17   0339   WBC  9.4   RBC  5.03   HEMOGLOBIN  14.0   HEMATOCRIT  42.2   MCV  83.9   MCH  27.8   MCHC  33.2*   RDW  40.7   PLATELETCT  297   MPV  9.9     Recent Labs      11/15/17   0339   SODIUM  133*   POTASSIUM  3.8   CHLORIDE  102   CO2  25   GLUCOSE  135*   BUN  11   CREATININE  0.45*   CALCIUM  7.9*         Intake/Output Summary (Last 24 hours) at 11/15/17 0650  Last data filed at 11/14/17 2000   Gross per 24 hour   Intake                0 ml   Output              750 ml   Net             -750 ml       Comfortable, no distress  Palpable DP  Dressing C/D/I      Assessment:    Doing well s/p great toe amp    Plan:      Abx  Elevate  Mobilize in protective shoe  Dressing changes daily  F/U with wound care   "

## 2017-11-15 NOTE — CARE PLAN
"Problem: Pain Management  Goal: Pain level will decrease to patient's comfort goal  Outcome: PROGRESSING AS EXPECTED  Pt pain managed with current pain regimen per MAR.     Problem: Mobility  Goal: Risk for activity intolerance will decrease  Pt expresses feeling more comfortable and stable ambulating after working with PT today and he is \"getting used to the boot, which makes it easier\".       "

## 2017-11-15 NOTE — DISCHARGE PLANNING
Met with pt to discuss discharge plans, IVABX at [facility]. Pt is aware of plan, states he has transportation to the infusion center and is aware that he needs dailt infusions through 12/22/2017.

## 2017-11-15 NOTE — DISCHARGE PLANNING
SW met with pt at bedside for assessment. Pt has been out of work since Nov 1st. Previously worked as . Pt does not drive but uses bus system. Pt's son will be in town in two days. Pt does not have an income at this time. Pt uses [facility] on [address]. SW to provide resources.   Care Transition Team Assessment    Information Source: Patient   Information Given By: Patient    Elopement Risk  Legal Hold: No  Ambulatory or Self Mobile in Wheelchair: No-Not an Elopement Risk  Disoriented: No  Psychiatric Symptoms: None  History of Wandering: No  Elopement this Admit: No  Vocalizing Wanting to Leave: No  Displays Behaviors, Body Language Wanting to Leave: No-Not at Risk for Elopement. Pt corporative and agreeable to plan.   Elopement Risk: Not at Risk for Elopement    Interdisciplinary Discharge Planning  Does Admitting Nurse Feel This Could be a Complex Discharge?: No  Primary Care Physician: None  Lives with - Patient's Self Care Capacity: roomate  Support Systems: Roommate and Son who lives in [place]  Housing / Facility: 1 Story House. 2 steps to front door.   Do You Take your Prescribed Medications Regularly: Yes  Able to Return to Previous ADL's: Yes. Pt reports he is independent in all ADLs  Mobility Issues: No  Prior Services: None  Patient Expects to be Discharged to:: Home  Assistance Needed: No  Durable Medical Equipment: Not Applicable    Vision / Hearing Impairment  Vision Impairment : No  Hearing Impairment : No    Values / Beliefs / Concerns  Values / Beliefs Concerns : No  Special Hospitalization Concerns: None    Domestic Abuse  Have you ever been the victim of abuse or violence?: No  Physical Abuse or Sexual Abuse: No  Verbal Abuse or Emotional Abuse: No

## 2017-11-15 NOTE — PROGRESS NOTES
PICC Insertion Procedure Note    Consents confirmed, vessel patency confirmed with ultrasound. Risks and benefits of procedure explained to patient/family and education regarding central line associated bloodstream infections provided. Questions answered.     PICC placed in LUE per MD order with ultrasound guidance. 4 Fr, single lumen PICC placed in basilic vein after one attempt(s). 3 cc's of 1% lidocaine injected intradermally, 21 gauge microintroducer needle and modified Seldinger technique used. 48 cm catheter inserted with good blood return. Secured at 0 cm marker. Each lumen flushed without resistance with 10 mL 0.9% normal saline. PICC line secured with Biopatch and Tegaderm.    CXR ordered, PICC placement confirmation will be provided by the Radiologist via interpretation of the follow-up chest x-ray to confirm tip location. Pt tolerated procedure well.  Patient condition relayed to unit RN or ordering physician via this post procedure note in the EMR.     [employer] Power PICC ref # [id number], Lot # [id number]

## 2017-11-16 LAB
ALBUMIN SERPL BCP-MCNC: 2.6 G/DL (ref 3.2–4.9)
ALBUMIN/GLOB SERPL: 0.6 G/DL
ALP SERPL-CCNC: 66 U/L (ref 30–99)
ALT SERPL-CCNC: 16 U/L (ref 2–50)
ANION GAP SERPL CALC-SCNC: 6 MMOL/L (ref 0–11.9)
AST SERPL-CCNC: 18 U/L (ref 12–45)
BASOPHILS # BLD AUTO: 0.6 % (ref 0–1.8)
BASOPHILS # BLD: 0.06 K/UL (ref 0–0.12)
BILIRUB SERPL-MCNC: 0.5 MG/DL (ref 0.1–1.5)
BUN SERPL-MCNC: 13 MG/DL (ref 8–22)
CALCIUM SERPL-MCNC: 8 MG/DL (ref 8.5–10.5)
CHLORIDE SERPL-SCNC: 100 MMOL/L (ref 96–112)
CO2 SERPL-SCNC: 26 MMOL/L (ref 20–33)
CREAT SERPL-MCNC: 0.53 MG/DL (ref 0.5–1.4)
EOSINOPHIL # BLD AUTO: 0.29 K/UL (ref 0–0.51)
EOSINOPHIL NFR BLD: 3 % (ref 0–6.9)
ERYTHROCYTE [DISTWIDTH] IN BLOOD BY AUTOMATED COUNT: 40.7 FL (ref 35.9–50)
GFR SERPL CREATININE-BSD FRML MDRD: >60 ML/MIN/1.73 M 2
GLOBULIN SER CALC-MCNC: 4.1 G/DL (ref 1.9–3.5)
GLUCOSE BLD-MCNC: 117 MG/DL (ref 65–99)
GLUCOSE BLD-MCNC: 120 MG/DL (ref 65–99)
GLUCOSE BLD-MCNC: 125 MG/DL (ref 65–99)
GLUCOSE SERPL-MCNC: 126 MG/DL (ref 65–99)
HCT VFR BLD AUTO: 41.2 % (ref 42–52)
HGB BLD-MCNC: 13.4 G/DL (ref 14–18)
IMM GRANULOCYTES # BLD AUTO: 0.06 K/UL (ref 0–0.11)
IMM GRANULOCYTES NFR BLD AUTO: 0.6 % (ref 0–0.9)
LYMPHOCYTES # BLD AUTO: 3.22 K/UL (ref 1–4.8)
LYMPHOCYTES NFR BLD: 33.5 % (ref 22–41)
MAGNESIUM SERPL-MCNC: 1.7 MG/DL (ref 1.5–2.5)
MCH RBC QN AUTO: 27.3 PG (ref 27–33)
MCHC RBC AUTO-ENTMCNC: 32.5 G/DL (ref 33.7–35.3)
MCV RBC AUTO: 83.9 FL (ref 81.4–97.8)
MONOCYTES # BLD AUTO: 0.71 K/UL (ref 0–0.85)
MONOCYTES NFR BLD AUTO: 7.4 % (ref 0–13.4)
NEUTROPHILS # BLD AUTO: 5.27 K/UL (ref 1.82–7.42)
NEUTROPHILS NFR BLD: 54.9 % (ref 44–72)
NRBC # BLD AUTO: 0 K/UL
NRBC BLD AUTO-RTO: 0 /100 WBC
PLATELET # BLD AUTO: 296 K/UL (ref 164–446)
PMV BLD AUTO: 9.8 FL (ref 9–12.9)
POTASSIUM SERPL-SCNC: 4.1 MMOL/L (ref 3.6–5.5)
PROT SERPL-MCNC: 6.7 G/DL (ref 6–8.2)
RBC # BLD AUTO: 4.91 M/UL (ref 4.7–6.1)
SODIUM SERPL-SCNC: 132 MMOL/L (ref 135–145)
WBC # BLD AUTO: 9.6 K/UL (ref 4.8–10.8)

## 2017-11-16 PROCEDURE — 770006 HCHG ROOM/CARE - MED/SURG/GYN SEMI*

## 2017-11-16 PROCEDURE — 700105 HCHG RX REV CODE 258: Performed by: HOSPITALIST

## 2017-11-16 PROCEDURE — 85025 COMPLETE CBC W/AUTO DIFF WBC: CPT

## 2017-11-16 PROCEDURE — A9270 NON-COVERED ITEM OR SERVICE: HCPCS | Performed by: INTERNAL MEDICINE

## 2017-11-16 PROCEDURE — 99232 SBSQ HOSP IP/OBS MODERATE 35: CPT | Performed by: HOSPITALIST

## 2017-11-16 PROCEDURE — 83735 ASSAY OF MAGNESIUM: CPT

## 2017-11-16 PROCEDURE — 700102 HCHG RX REV CODE 250 W/ 637 OVERRIDE(OP): Performed by: HOSPITALIST

## 2017-11-16 PROCEDURE — 700111 HCHG RX REV CODE 636 W/ 250 OVERRIDE (IP): Performed by: HOSPITALIST

## 2017-11-16 PROCEDURE — 80053 COMPREHEN METABOLIC PANEL: CPT

## 2017-11-16 PROCEDURE — 82962 GLUCOSE BLOOD TEST: CPT | Mod: 91

## 2017-11-16 PROCEDURE — A9270 NON-COVERED ITEM OR SERVICE: HCPCS | Performed by: HOSPITALIST

## 2017-11-16 PROCEDURE — 700102 HCHG RX REV CODE 250 W/ 637 OVERRIDE(OP): Performed by: INTERNAL MEDICINE

## 2017-11-16 RX ADMIN — OXYCODONE HYDROCHLORIDE 10 MG: 10 TABLET ORAL at 05:42

## 2017-11-16 RX ADMIN — OXYCODONE HYDROCHLORIDE 5 MG: 5 TABLET ORAL at 20:38

## 2017-11-16 RX ADMIN — POTASSIUM CHLORIDE 40 MEQ: 1500 TABLET, EXTENDED RELEASE ORAL at 08:08

## 2017-11-16 RX ADMIN — AMPICILLIN SODIUM AND SULBACTAM SODIUM 3 G: 2; 1 INJECTION, POWDER, FOR SOLUTION INTRAMUSCULAR; INTRAVENOUS at 10:53

## 2017-11-16 RX ADMIN — METFORMIN HYDROCHLORIDE 500 MG: 500 TABLET, FILM COATED ORAL at 08:06

## 2017-11-16 RX ADMIN — NYSTATIN 1500000 UNITS: 100000 POWDER TOPICAL at 08:07

## 2017-11-16 RX ADMIN — AMPICILLIN SODIUM AND SULBACTAM SODIUM 3 G: 2; 1 INJECTION, POWDER, FOR SOLUTION INTRAMUSCULAR; INTRAVENOUS at 00:27

## 2017-11-16 RX ADMIN — ONDANSETRON 4 MG: 2 INJECTION INTRAMUSCULAR; INTRAVENOUS at 08:03

## 2017-11-16 RX ADMIN — OXYCODONE HYDROCHLORIDE 10 MG: 10 TABLET ORAL at 10:52

## 2017-11-16 RX ADMIN — AMPICILLIN SODIUM AND SULBACTAM SODIUM 3 G: 2; 1 INJECTION, POWDER, FOR SOLUTION INTRAMUSCULAR; INTRAVENOUS at 18:14

## 2017-11-16 RX ADMIN — AMPICILLIN SODIUM AND SULBACTAM SODIUM 3 G: 2; 1 INJECTION, POWDER, FOR SOLUTION INTRAMUSCULAR; INTRAVENOUS at 05:42

## 2017-11-16 RX ADMIN — OXYCODONE HYDROCHLORIDE 10 MG: 10 TABLET ORAL at 01:02

## 2017-11-16 RX ADMIN — OXYCODONE HYDROCHLORIDE 10 MG: 10 TABLET ORAL at 15:40

## 2017-11-16 RX ADMIN — METFORMIN HYDROCHLORIDE 500 MG: 500 TABLET, FILM COATED ORAL at 16:52

## 2017-11-16 RX ADMIN — NYSTATIN 1500000 UNITS: 100000 POWDER TOPICAL at 20:39

## 2017-11-16 ASSESSMENT — ENCOUNTER SYMPTOMS
NAUSEA: 0
TREMORS: 0
SHORTNESS OF BREATH: 0
PHOTOPHOBIA: 0
CHILLS: 0
DEPRESSION: 0
DIZZINESS: 0
PALPITATIONS: 0
SPUTUM PRODUCTION: 0
BLOOD IN STOOL: 0
NERVOUS/ANXIOUS: 0
ORTHOPNEA: 0
ABDOMINAL PAIN: 0
DIARRHEA: 0
COUGH: 0
WEAKNESS: 0
BRUISES/BLEEDS EASILY: 0
DOUBLE VISION: 0
HEMOPTYSIS: 0
STRIDOR: 0
PND: 0
EYE DISCHARGE: 0
BLURRED VISION: 0
NAUSEA: 1
FEVER: 0
CLAUDICATION: 0
CONSTIPATION: 0
EYE REDNESS: 0
HEARTBURN: 0
MEMORY LOSS: 0
SPEECH CHANGE: 0
BACK PAIN: 0
VOMITING: 0
DIAPHORESIS: 0
EYE PAIN: 0
SENSORY CHANGE: 0
MYALGIAS: 0
WHEEZING: 0
HEADACHES: 0
SORE THROAT: 0
TINGLING: 1
NECK PAIN: 0

## 2017-11-16 ASSESSMENT — PAIN SCALES - GENERAL
PAINLEVEL_OUTOF10: 2
PAINLEVEL_OUTOF10: 6
PAINLEVEL_OUTOF10: 3
PAINLEVEL_OUTOF10: 5
PAINLEVEL_OUTOF10: 7
PAINLEVEL_OUTOF10: 6
PAINLEVEL_OUTOF10: 6
PAINLEVEL_OUTOF10: 7
PAINLEVEL_OUTOF10: 3

## 2017-11-16 NOTE — PROGRESS NOTES
"LIMB PRESERVATION SERVICE       53 y/o male with new diagnosis of DM. Admitted for severe sepsis and gangrene of right great toe.    POD #5 s/p amputation of right great toe by Dr. Leo    /81   Pulse 74   Temp 36.4 °C (97.5 °F)   Resp 18   Ht 1.829 m (6' 0.01\")   Wt (!) 141.5 kg (311 lb 15.2 oz)   SpO2 91%   BMI 42.30 kg/m²    Blood glucose 120    Diabetes educator has met with him.  Glucometer provided    OR cx + for group B strep and   Proteus  ID involved  Plan for 6 weeks IV abx- ertapenem  PICC in place    CM working on HH or outpatient infusion ctr    Dressing changed.  Sutures intact, scant drainage.  Necrosis to medial incision, ~ 1/3 of incision    PLAN  - Daily dressing changes by nscapo  -ok for discharge from LPS standpoint  -Abx per ID  -f/u with LPS rounds on 12/1. Arrange for outpt wound care.  "

## 2017-11-16 NOTE — DISCHARGE PLANNING
Planning for discharge to home tomorrow.  [facility]: to begin treatment 11/18 at 0830 for Q day infusion through 12/22/2017.  [facility]: to begin weekly treatment on Wedneday 11/22/2017 at 0900 with check in at 0845.  Provided [place] map with date, time and location of Wound Clinic and Infusion Center high lighted for pt.

## 2017-11-16 NOTE — PROGRESS NOTES
[facility]    Date of Service: 11/16/2017    Chief Complaint  52 y.o. Male  admitted 11/10/2017 with right great toe swelling, pain . Dx Severe sepsis w Gangrene Rt great Toe- post  Right Great Toe Amputation 11-10-17     Interval Problem Update  Patient doing well clinically, no acute complaints. Patient denies fevers/chills, chest pain, shortness of breath or nausea/vommiting.   Continue wound care  continue IV Unasyn, cultures group B strep, ID following.    11/15  Patient doing well overall, no gross changes clinically.  Continue IV Unasyn.  ID following,   Continue wound care.    11/16  Patient doing well, had nausea this morning, says doesn't know if he is able to keep PO down. Continue IV zofran prn.  Continue antibiotics.  Outpatient infusion being set up.    Consultants/Specialty  Orthopedic surgery Dr. Leo  ID .    Disposition  TBD        Review of Systems   Constitutional: Negative for chills, diaphoresis, fever and malaise/fatigue.   HENT: Negative for congestion, hearing loss, sore throat and tinnitus.    Eyes: Negative for blurred vision, double vision, photophobia, pain, discharge and redness.   Respiratory: Negative for cough, hemoptysis, sputum production, shortness of breath, wheezing and stridor.    Cardiovascular: Positive for leg swelling. Negative for chest pain, palpitations, orthopnea, claudication and PND.   Gastrointestinal: Positive for nausea. Negative for abdominal pain, blood in stool, constipation, heartburn, melena and vomiting.   Genitourinary: Negative for dysuria, frequency and urgency.   Musculoskeletal: Positive for joint pain. Negative for back pain, myalgias and neck pain.        Improved.   Skin: Negative for itching and rash.   Neurological: Positive for tingling (chronic in feet.). Negative for dizziness, tremors, sensory change, speech change, weakness and headaches.   Endo/Heme/Allergies: Does not bruise/bleed easily.    Psychiatric/Behavioral: Negative for depression, memory loss and suicidal ideas. The patient is not nervous/anxious.       Physical Exam  Laboratory/Imaging   Hemodynamics  Temp (24hrs), Av.7 °C (98 °F), Min:36.4 °C (97.5 °F), Max:36.9 °C (98.5 °F)   Temperature: 36.9 °C (98.5 °F)  Pulse  Av  Min: 70  Max: 120    Blood Pressure: (!) 169/89      Respiratory      Respiration: 16, Pulse Oximetry: 90 %             Fluids    Intake/Output Summary (Last 24 hours) at 17 1422  Last data filed at 17 1300   Gross per 24 hour   Intake                0 ml   Output              600 ml   Net             -600 ml       Nutrition  Orders Placed This Encounter   Procedures   • Diet Order     Standing Status:   Standing     Number of Occurrences:   1     Order Specific Question:   Diet:     Answer:   Diabetic [3]     Physical Exam   Constitutional: He is oriented to person, place, and time. He appears well-developed and well-nourished. No distress.   HENT:   Head: Normocephalic and atraumatic.   Mouth/Throat: No oropharyngeal exudate.   Eyes: Conjunctivae and EOM are normal. Pupils are equal, round, and reactive to light. Right eye exhibits no discharge. No scleral icterus.   Neck: Neck supple. No JVD present. No thyromegaly present.   Cardiovascular: Normal rate, regular rhythm and intact distal pulses.    No murmur heard.  Pulmonary/Chest: Effort normal and breath sounds normal. No stridor. No respiratory distress. He has no wheezes. He has no rales.   Abdominal: Soft. Bowel sounds are normal. He exhibits no distension. There is no tenderness. There is no rebound.   Obese.    Musculoskeletal: Normal range of motion. He exhibits edema (trace b/l L/e edema) and tenderness (right toe area on palpation, improving).   Right great Toe amputation no erythema, sutures in place dressing intact, dry and clean   Neurological: He is alert and oriented to person, place, and time. No cranial nerve deficit.   Skin: Skin is  warm and dry. No rash noted. He is not diaphoretic. No erythema.   Psychiatric: He has a normal mood and affect. His behavior is normal. Thought content normal.   Nursing note and vitals reviewed.      Recent Labs      11/15/17   0339  11/16/17   0110   WBC  9.4  9.6   RBC  5.03  4.91   HEMOGLOBIN  14.0  13.4*   HEMATOCRIT  42.2  41.2*   MCV  83.9  83.9   MCH  27.8  27.3   MCHC  33.2*  32.5*   RDW  40.7  40.7   PLATELETCT  297  296   MPV  9.9  9.8     Recent Labs      11/15/17   0339  11/16/17   0110   SODIUM  133*  132*   POTASSIUM  3.8  4.1   CHLORIDE  102  100   CO2  25  26   GLUCOSE  135*  126*   BUN  11  13   CREATININE  0.45*  0.53   CALCIUM  7.9*  8.0*                      Assessment/Plan     * Severe sepsis (CMS-HCC)- (present on admission)   Assessment & Plan    This is severe sepsis with organ dysfunction including musculoskeletal system  2/2 to right toe gangrene s/p amputation.  Cultures + group B Strep.  Continue IV unasyn per ID. Pending further recommendations PO antibiotics?  Wound care  Sepsis resolved          Gangrene of toe of right foot (CMS-HCC)- (present on admission)   Assessment & Plan    Post Toe amputation 11-10-17   Continue IV Unasyn for cellulitis, PO augmentin on discharge?   ID following.  Strict Diabetes control  Wound care to continue        Hypokalemia- (present on admission)   Assessment & Plan    Resolved  Follow daily bmp        Hyperglycemia- (present on admission)   Assessment & Plan    Due to type II diabetes mellitus- newly dx.  HbA1c is  9.2  Continue Metformin  Continue Insulin-sliding scale, accu-checks and hypoglycemia protocol.  Diabetic Education        Class 3 obesity with serious comorbidity in adult   Assessment & Plan    Counseling provided on diet and exercise.         Hyponatremia- (present on admission)   Assessment & Plan    Na:132, continue to monitor. encouarge PO intake.      Candidal intertrigo- (present on admission)   Assessment & Plan    Nystatin powder  affected areas        Hypomagnesemia  resolved    Patient plan of care discussed at multidisplinary team rounds and with patient and R.N at [facility].         Reviewed items::  Labs reviewed, Medications reviewed and Radiology images reviewed  Zabala catheter::  No Zabala  DVT prophylaxis pharmacological::  Enoxaparin (Lovenox)  Ulcer Prophylaxis::  Not indicated  Antibiotics:  Treating active infection/contamination beyond 24 hours perioperative coverage

## 2017-11-16 NOTE — DISCHARGE PLANNING
Spoke to pt about dressing changes daily. He is confident that he will be able to do this independently. Will schedule weekly follow up at wound clinic, awaiting call back from wound clinic.

## 2017-11-16 NOTE — PROGRESS NOTES
Diabetes education: Met with Sebastián to discuss finger sticks. Pt elected to take Accucheck Guide meter with 10 strips. Finger sticks and use of meter discussed.   Current blood sugars are : 120, 143, 133, 148, and 150. Reviewed information from yesterday and handout on resources given in addition to handouts from yesterday.   Reviewed need to eat three meals and hs snack with carbohydrates and proteins.   Questions answered.  Plan: Pt will need a prescription for Accucheck Guide test strips to test once a day. Pt will need this prescription to access the coupon that allows him to pay $19.99 for 50 strips. Pt will also look at the [employer] Reli-On brand meter when able.   Please call [phone] if needs change.

## 2017-11-16 NOTE — PROGRESS NOTES
Infectious Disease Progress Note    Author: Cristy Holley M.D. Date & Time of service: 2017  9:42 AM    Chief Complaint:  Gangrene    Interval History:  52 y.o. WM admitted 11/10/2017 with right great toe gangrene    AF feeling better overall-pain controlled. Denies SE abx  11/15 AF tolerating abx well-continuing to ooze from wound   AF denies SE abx-got PICC  Labs Reviewed, Medications Reviewed, Radiology Reviewed and Wound Reviewed.    Review of Systems:  Review of Systems   Constitutional: Negative for chills and fever.   Gastrointestinal: Negative for abdominal pain, diarrhea, nausea and vomiting.   Musculoskeletal: Positive for joint pain.   All other systems reviewed and are negative.      Hemodynamics:  Temp (24hrs), Av.6 °C (97.9 °F), Min:36.4 °C (97.5 °F), Max:36.9 °C (98.5 °F)  Temperature: 36.9 °C (98.5 °F)  Pulse  Av  Min: 70  Max: 120   Blood Pressure: (!) 169/89       Physical Exam:  Physical Exam   Constitutional: He is oriented to person, place, and time. He appears well-developed. No distress.   Obese   HENT:   Head: Normocephalic and atraumatic.   Eyes: EOM are normal. Pupils are equal, round, and reactive to light.   Neck: Neck supple.   Cardiovascular: Normal rate and regular rhythm.    Pulmonary/Chest: Effort normal. No respiratory distress. He has no wheezes.   Abdominal: Soft. He exhibits no distension. There is no tenderness.   Musculoskeletal: He exhibits edema.   Serosanguinous drainage resolved  Right great toe amp site-incisional necrosis. Decreased edema + erythema  LUE PICC   Neurological: He is alert and oriented to person, place, and time.   Nursing note and vitals reviewed.      Meds:    Current Facility-Administered Medications:   •  PICC Line Insertion has been implemented **AND** May use Lidocaine 1% not to exceed 3 mls for local at insertion site **AND** NOTIFY MD **AND** Tip to dwell in the superior vena cava **AND** Do not use PICC Line until  placement verified by Chest X Ray **AND** DX-CHEST-FOR PICC LINE Perform procedure in: PICC Room **AND** If radiologist reading of chest X-ray states any of the following the PICC should be used **AND** Further evaluation of the PICC placement can be retrieved from X-Ray and Imaging **AND** Blood draws through PICC line; draws by RN only **AND** FLUSHING GUIDELINES WHEN IN USE **AND** normal saline PF **AND** FLUSHING GUIDELINES WHEN NOT IN USE **AND** DRESSING MAINTENANCE **AND** Change needleless pressure ports and IV tubing every 72 hours per hospital policy **AND** TUBING **AND** If there is an MD order to remove the PICC line, any RN may remove the PICC line **AND** [] PATIENT EDUCATION MATERIALS **AND** NURSING COMMUNICATION  •  potassium chloride SA  •  metformin  •  oxycodone immediate-release **OR** oxycodone immediate-release  •  senna-docusate **AND** polyethylene glycol/lytes **AND** magnesium hydroxide **AND** bisacodyl  •  enoxaparin  •  insulin regular **AND** Accu-Chek ACHS **AND** NOTIFY MD and PharmD **AND** glucose 4 g **AND** dextrose 50%  •  ondansetron  •  ondansetron  •  promethazine  •  promethazine  •  nystatin  •  ampicillin-sulbactam (UNASYN) IV  •  morphine injection    Labs:  Recent Labs      11/15/17   03317   0110   WBC  9.4  9.6   RBC  5.03  4.91   HEMOGLOBIN  14.0  13.4*   HEMATOCRIT  42.2  41.2*   MCV  83.9  83.9   MCH  27.8  27.3   RDW  40.7  40.7   PLATELETCT  297  296   MPV  9.9  9.8   NEUTSPOLYS  57.70  54.90   LYMPHOCYTES  29.90  33.50   MONOCYTES  7.80  7.40   EOSINOPHILS  2.80  3.00   BASOPHILS  0.60  0.60     Recent Labs      11/15/17   03317   0110   SODIUM  133*  132*   POTASSIUM  3.8  4.1   CHLORIDE  102  100   CO2  25  26   GLUCOSE  135*  126*   BUN  11  13     Recent Labs      11/15/17   0339  17   0110   ALBUMIN  2.6*  2.6*   TBILIRUBIN  0.6  0.5   ALKPHOSPHAT  66  66   TOTPROTEIN  6.6  6.7   ALTSGPT  13  16   ASTSGOT  19  18   CREATININE   0.45*  0.53       Imaging:  Dx-chest-portable (1 View)    Result Date: 11/10/2017  11/10/2017 3:43 PM HISTORY/REASON FOR EXAM:  Ground-level fall. Weakness. Shortness of breath. TECHNIQUE/EXAM DESCRIPTION AND NUMBER OF VIEWS: Single portable view of the chest. COMPARISON: None FINDINGS: No pneumothorax identified. There is no evidence of focal consolidation or evidence of pulmonary edema. There is no evidence of pleural effusion. The heart is normal in size.     No pneumothorax identified.    Dx-foot-complete 3+ Right    Result Date: 11/10/2017  11/10/2017 3:43 PM HISTORY/REASON FOR EXAM:  Right great toe pain. Open wound in the right great toe region. TECHNIQUE/EXAM DESCRIPTION AND NUMBER OF VIEWS: 3 views of the RIGHT foot. COMPARISON:  None. FINDINGS: There is subcutaneous air or gas in the plantar aspect of the right 1st toe region distally consistent with the open wound. No underlying bony lytic or blastic change is noted to suggest osteomyelitis. There is flexion deformity and lateral deviation of the right 2nd through 5th MTP joints. There is no evidence of displaced fracture or dislocation.     1.  Soft tissue gas consistent with open wound in the plantar aspect of the distal medial aspect of the right 1st toe. 2.  No plain film evidence of osteomyelitis.    Le Art/shayan    Result Date: 11/13/2017   Vascular Laboratory  Conclusions  No prior study is available for comparison.  Bilateral.  No evidence of arterial insufficiency.            SHAYAN                                           TBI  Findings  Bilateral:  Doppler waveforms of the common femoral artery are of high amplitude and  triphasic.  Doppler waveforms at the ankle are brisk and triphasic.  Ankle-brachial indices are normal.  Victor M Villalobos M.D.  (Electronically Signed)  Final Date:      13 November 2017                   22:51      Micro:  Results     Procedure Component Value Units Date/Time    BLOOD CULTURE [[id number]] Collected:  11/10/17 5580  "   Order Status:  Completed Specimen:  Blood from Peripheral Updated:  11/15/17 1700     Significant Indicator NEG     Source BLD     Site PERIPHERAL     Blood Culture No growth after 5 days of incubation.    Narrative:       Per Hospital Policy: Only change Specimen Src: to \"Line\" if  specified by physician order.    BLOOD CULTURE [[id number]] Collected:  11/10/17 1440    Order Status:  Completed Specimen:  Blood from Peripheral Updated:  11/15/17 1700     Significant Indicator NEG     Source BLD     Site PERIPHERAL     Blood Culture No growth after 5 days of incubation.    Narrative:       Per Hospital Policy: Only change Specimen Src: to \"Line\" if  specified by physician order.    CULTURE TISSUE W/ GRM STAIN [[id number]]  (Abnormal) Collected:  11/10/17 1917    Order Status:  Completed Specimen:  Tissue Updated:  11/13/17 1733     Gram Stain Result -- (A)     Rare WBCs.  Few Gram positive cocci.       Significant Indicator POS (POS)     Source TISS     Site Periosteal Right Great Toe     Tissue Culture -- (A)     Tissue Culture -- (A)     Proteus mirabilis  Light growth  See previous culture for sensitivity report.       Tissue Culture -- (A)     Streptococcus agalactiae (Group B)  Moderate growth      ANAEROBIC CULTURE [[id number]] Collected:  11/10/17 1917    Order Status:  Completed Specimen:  Tissue Updated:  11/13/17 1733     Significant Indicator NEG     Source TISS     Site Periosteal Right Great Toe     Anaerobic Culture, Culture Res No Anaerobes isolated.    CULTURE TISSUE W/ GRM STAIN [[id number]]  (Abnormal)  (Susceptibility) Collected:  11/10/17 1917    Order Status:  Completed Specimen:  Tissue Updated:  11/13/17 1732     Significant Indicator POS (POS)     Source TISS     Site Right Great Toe Deep Tissue     Tissue Culture -- (A)     Gram Stain Result -- (A)     Few WBCs.  Few Gram positive cocci.  Rare Gram negative rods.       Tissue Culture -- (A)     Proteus mirabilis  Moderate growth       Tissue " Culture -- (A)     Streptococcus agalactiae (Group B)  Moderate growth      Culture & Susceptibility     PROTEUS MIRABILIS     Antibiotic Sensitivity Microscan Unit Status    Ampicillin Sensitive <=8 mcg/mL Final    Cefepime Sensitive <=8 mcg/mL Final    Cefotaxime Sensitive <=2 mcg/mL Final    Cefotetan Sensitive <=16 mcg/mL Final    Ceftazidime Sensitive <=1 mcg/mL Final    Ceftriaxone Sensitive <=8 mcg/mL Final    Cefuroxime Sensitive <=4 mcg/mL Final    Ciprofloxacin Sensitive <=1 mcg/mL Final    Ertapenem Sensitive <=1 mcg/mL Final    Gentamicin Sensitive <=4 mcg/mL Final    Pip/Tazobactam Sensitive <=16 mcg/mL Final    Tobramycin Sensitive <=4 mcg/mL Final    Trimeth/Sulfa Sensitive <=2/38 mcg/mL Final                       ANAEROBIC CULTURE [[id number]] Collected:  11/10/17 1917    Order Status:  Completed Specimen:  Tissue Updated:  11/13/17 1732     Significant Indicator NEG     Source TISS     Site Right Great Toe Deep Tissue     Anaerobic Culture, Culture Res No Anaerobes isolated.    URINE CULTURE(NEW) [[id number]] Collected:  11/11/17 0509    Order Status:  Completed Specimen:  Urine Updated:  11/13/17 0912     Significant Indicator NEG     Source UR     Site --     Urine Culture No growth at 48 hours    Narrative:       Indication for culture:->Emergency Room Patient    GRAM STAIN [[id number]] Collected:  11/10/17 1917    Order Status:  Completed Specimen:  Tissue Updated:  11/11/17 0845     Significant Indicator .     Source TISS     Site Periosteal Right Great Toe     Gram Stain Result --     Rare WBCs.  Few Gram positive cocci.      GRAM STAIN [[id number]] Collected:  11/10/17 1917    Order Status:  Completed Specimen:  Tissue Updated:  11/11/17 0845     Significant Indicator .     Source TISS     Site Right Great Toe Deep Tissue     Gram Stain Result --     Few WBCs.  Few Gram positive cocci.  Rare Gram negative rods.      URINALYSIS [[id number]]  (Abnormal) Collected:  11/11/17 0509    Order Status:   Completed Specimen:  Urine Updated:  11/11/17 0533     Color DK Yellow     Character Cloudy (A)     Specific Gravity 1.024     Ph 5.0     Glucose Negative mg/dL      Ketones Negative mg/dL      Protein 30 (A) mg/dL      Bilirubin Small (A)     Urobilinogen, Urine 1.0     Nitrite Negative     Leukocyte Esterase Negative     Occult Blood Negative     Micro Urine Req Microscopic    Narrative:       Indication for culture:->Emergency Room Patient    URINALYSIS [[id number]]     Order Status:  Canceled Specimen:  Urine     URINE CULTURE(NEW) [[id number]] Collected:  11/10/17 0000    Order Status:  Canceled Specimen:  Urine     BLOOD CULTURE [[id number]] Collected:  11/10/17 0000    Order Status:  Canceled Specimen:  Other from Peripheral     BLOOD CULTURE [[id number]] Collected:  11/10/17 0000    Order Status:  Canceled Specimen:  Other from Peripheral           Assessment:  Active Hospital Problems    Diagnosis   • *Severe sepsis (CMS-HCC) [A41.9, R65.20]   • Gangrene of toe of right foot (CMS-HCC) [I96]   • Candidal intertrigo [B37.2]   • Hyponatremia [E87.1]   • Class 3 obesity with serious comorbidity in adult [E66.9]   • Hyperglycemia [R73.9]   • Hypokalemia [E87.6]       Plan:  Sepsis (CMS-HCC)- improved  Due to gangrene-s/p amp  Fever resolved  Leukocytosis resolved  Lactic acidosis resolved  Continue IV Unasyn.  Blood cxs neg 11/11      Gangrene of toe of right foot (CMS-HCC)-   S/p right great toe amputation 11-10-17   Cxs are group B strep and   Proteus  Path shows ulceration to margin  Order PICC placement   Plan for 6 weeks IV abx- ertapenem  Stop date 12/22/2017  Wound care to continue  Orders faxed to  11/15      Diabetes- (present on admission)  Keep BS under 150 to help control current infection  HbA1c is  9.2    Discussed with LPS

## 2017-11-17 LAB
GLUCOSE BLD-MCNC: 117 MG/DL (ref 65–99)
GLUCOSE BLD-MCNC: 118 MG/DL (ref 65–99)
GLUCOSE BLD-MCNC: 124 MG/DL (ref 65–99)
GLUCOSE BLD-MCNC: 146 MG/DL (ref 65–99)
GLUCOSE BLD-MCNC: 153 MG/DL (ref 65–99)

## 2017-11-17 PROCEDURE — 700102 HCHG RX REV CODE 250 W/ 637 OVERRIDE(OP): Performed by: HOSPITALIST

## 2017-11-17 PROCEDURE — 700111 HCHG RX REV CODE 636 W/ 250 OVERRIDE (IP): Performed by: HOSPITALIST

## 2017-11-17 PROCEDURE — 99232 SBSQ HOSP IP/OBS MODERATE 35: CPT | Performed by: HOSPITALIST

## 2017-11-17 PROCEDURE — 82962 GLUCOSE BLOOD TEST: CPT | Mod: 91

## 2017-11-17 PROCEDURE — A9270 NON-COVERED ITEM OR SERVICE: HCPCS | Performed by: HOSPITALIST

## 2017-11-17 PROCEDURE — 700102 HCHG RX REV CODE 250 W/ 637 OVERRIDE(OP): Performed by: INTERNAL MEDICINE

## 2017-11-17 PROCEDURE — 700105 HCHG RX REV CODE 258

## 2017-11-17 PROCEDURE — A9270 NON-COVERED ITEM OR SERVICE: HCPCS | Performed by: INTERNAL MEDICINE

## 2017-11-17 PROCEDURE — 770006 HCHG ROOM/CARE - MED/SURG/GYN SEMI*

## 2017-11-17 PROCEDURE — 700105 HCHG RX REV CODE 258: Performed by: HOSPITALIST

## 2017-11-17 RX ORDER — SODIUM CHLORIDE 9 MG/ML
INJECTION, SOLUTION INTRAVENOUS
Status: COMPLETED
Start: 2017-11-17 | End: 2017-11-17

## 2017-11-17 RX ADMIN — OXYCODONE HYDROCHLORIDE 5 MG: 5 TABLET ORAL at 06:11

## 2017-11-17 RX ADMIN — OXYCODONE HYDROCHLORIDE 5 MG: 5 TABLET ORAL at 11:19

## 2017-11-17 RX ADMIN — AMPICILLIN SODIUM AND SULBACTAM SODIUM 3 G: 2; 1 INJECTION, POWDER, FOR SOLUTION INTRAMUSCULAR; INTRAVENOUS at 00:26

## 2017-11-17 RX ADMIN — METFORMIN HYDROCHLORIDE 500 MG: 500 TABLET, FILM COATED ORAL at 16:54

## 2017-11-17 RX ADMIN — OXYCODONE HYDROCHLORIDE 5 MG: 5 TABLET ORAL at 18:05

## 2017-11-17 RX ADMIN — OXYCODONE HYDROCHLORIDE 5 MG: 5 TABLET ORAL at 00:28

## 2017-11-17 RX ADMIN — METFORMIN HYDROCHLORIDE 500 MG: 500 TABLET, FILM COATED ORAL at 08:08

## 2017-11-17 RX ADMIN — AMPICILLIN SODIUM AND SULBACTAM SODIUM 3 G: 2; 1 INJECTION, POWDER, FOR SOLUTION INTRAMUSCULAR; INTRAVENOUS at 16:54

## 2017-11-17 RX ADMIN — OXYCODONE HYDROCHLORIDE 10 MG: 10 TABLET ORAL at 22:13

## 2017-11-17 RX ADMIN — ONDANSETRON 4 MG: 4 TABLET, ORALLY DISINTEGRATING ORAL at 16:54

## 2017-11-17 RX ADMIN — AMPICILLIN SODIUM AND SULBACTAM SODIUM 3 G: 2; 1 INJECTION, POWDER, FOR SOLUTION INTRAMUSCULAR; INTRAVENOUS at 11:04

## 2017-11-17 RX ADMIN — AMPICILLIN SODIUM AND SULBACTAM SODIUM 3 G: 2; 1 INJECTION, POWDER, FOR SOLUTION INTRAMUSCULAR; INTRAVENOUS at 05:25

## 2017-11-17 RX ADMIN — NYSTATIN 1500000 UNITS: 100000 POWDER TOPICAL at 20:35

## 2017-11-17 RX ADMIN — SODIUM CHLORIDE: 9 INJECTION, SOLUTION INTRAVENOUS at 17:30

## 2017-11-17 RX ADMIN — POTASSIUM CHLORIDE 40 MEQ: 1500 TABLET, EXTENDED RELEASE ORAL at 08:08

## 2017-11-17 RX ADMIN — NYSTATIN 1500000 UNITS: 100000 POWDER TOPICAL at 08:12

## 2017-11-17 RX ADMIN — ENOXAPARIN SODIUM 40 MG: 100 INJECTION SUBCUTANEOUS at 08:08

## 2017-11-17 RX ADMIN — ONDANSETRON 4 MG: 2 INJECTION INTRAMUSCULAR; INTRAVENOUS at 08:06

## 2017-11-17 RX ADMIN — PROMETHAZINE HYDROCHLORIDE 12.5 MG: 25 TABLET ORAL at 11:04

## 2017-11-17 RX ADMIN — AMPICILLIN SODIUM AND SULBACTAM SODIUM 3 G: 2; 1 INJECTION, POWDER, FOR SOLUTION INTRAMUSCULAR; INTRAVENOUS at 23:46

## 2017-11-17 RX ADMIN — INSULIN HUMAN 3 UNITS: 100 INJECTION, SOLUTION PARENTERAL at 11:04

## 2017-11-17 ASSESSMENT — PAIN SCALES - GENERAL
PAINLEVEL_OUTOF10: 7
PAINLEVEL_OUTOF10: 2
PAINLEVEL_OUTOF10: 6
PAINLEVEL_OUTOF10: 3
PAINLEVEL_OUTOF10: 2
PAINLEVEL_OUTOF10: 6

## 2017-11-17 ASSESSMENT — ENCOUNTER SYMPTOMS
HEARTBURN: 0
BLURRED VISION: 0
COUGH: 0
PND: 0
TREMORS: 0
WEAKNESS: 0
CLAUDICATION: 0
TINGLING: 1
NECK PAIN: 0
NAUSEA: 1
VOMITING: 1
DIARRHEA: 0
DIAPHORESIS: 0
DEPRESSION: 0
DIZZINESS: 0
STRIDOR: 0
VOMITING: 0
WHEEZING: 0
MYALGIAS: 0
EYE PAIN: 0
HEMOPTYSIS: 0
HEADACHES: 0
SHORTNESS OF BREATH: 0
FEVER: 0
BRUISES/BLEEDS EASILY: 0
PALPITATIONS: 0
NERVOUS/ANXIOUS: 0
SENSORY CHANGE: 0
BACK PAIN: 0
CHILLS: 0
BLOOD IN STOOL: 0
MEMORY LOSS: 0
ORTHOPNEA: 0
SPEECH CHANGE: 0
ABDOMINAL PAIN: 0
SORE THROAT: 0
DOUBLE VISION: 0
SPUTUM PRODUCTION: 0
CONSTIPATION: 0
PHOTOPHOBIA: 0
EYE REDNESS: 0
EYE DISCHARGE: 0

## 2017-11-17 NOTE — DISCHARGE PLANNING
CM and SW updated that pt will not be discharging today due to continued N/V.  MD plan to DC tomorrow, 11/18.  Outpatient IV antibiotic scheduled for 11/18.  CM phoned [facility] and rescheduled appointment for 0830 on Sunday, 11/19.  Pt updated and voiced his understanding.  CM also updated pt's map to indicate new date/time.

## 2017-11-17 NOTE — PROGRESS NOTES
"LIMB PRESERVATION SERVICE       51 y/o male with new diagnosis of DM. Admitted for severe sepsis and gangrene of right great toe.    POD #7 s/p amputation of right great toe by Dr. Leo    BP (!) 167/77   Pulse 100   Temp 37 °C (98.6 °F)   Resp 16   Ht 1.829 m (6' 0.01\")   Wt (!) 141.5 kg (311 lb 15.2 oz)   SpO2 95%   BMI 42.30 kg/m²    Blood glucose 117    Remains IP d/t N/V    Diabetes educator has met with him.  Glucometer provided    OR cx + for group B strep and   Proteus  ID involved  Plan for 6 weeks IV abx- ertapenem  PICC in place      R great toe amp:  Sutures intact, scant s/s drainage.  Necrosis to medial incision, ~ 1/3 of incision  Erythema and edema have decreased,   Erythema and tenderness remain localized around incision  Blister proximal to incision. Cont to monitor    R DTI to anterior tibial tendon dorsal aspect, not POA. Grey discoloration, tenderness resolved, non blanching  Measurements: 1.1x0.5cm     R DTI lateral foot 5th MTH, 5th metatarsal, not POA. Tenderness resolved, purple non blanching  Measurements :5x1cm for 5th met and 1x0.8cm for the 5th MTH    Incision cleaned with NS, applied dry adaptic, dry gauze. Secured dressings with kerlix.          PLAN  - Daily dressing changes by nsg  -ok for discharge from LPS standpoint  -Abx per ID  HWB RLE in offloading shoe    DC plan  -f/u with LPS rounds on 12/1. outpt wound care and IV abx arranged  "

## 2017-11-17 NOTE — PROGRESS NOTES
Infectious Disease Progress Note    Author: Cristy Holley M.D. Date & Time of service: 2017  2:48 PM    Chief Complaint:  Gangrene    Interval History:  52 y.o. WM admitted 11/10/2017 with right great toe gangrene    AF feeling better overall-pain controlled. Denies SE abx  11/15 AF tolerating abx well-continuing to ooze from wound   AF denies SE abx-got PICC   AF had some nausea yesterday-better today  Labs Reviewed, Medications Reviewed, Radiology Reviewed and Wound Reviewed.    Review of Systems:  Review of Systems   Constitutional: Negative for chills and fever.   Gastrointestinal: Positive for nausea. Negative for abdominal pain, diarrhea and vomiting.   Musculoskeletal: Positive for joint pain.   All other systems reviewed and are negative.      Hemodynamics:  Temp (24hrs), Av.8 °C (98.2 °F), Min:36.7 °C (98 °F), Max:37 °C (98.6 °F)  Temperature:  (Q8 vitials per nurse)  Pulse  Av.8  Min: 70  Max: 120   Blood Pressure: (!) 167/77       Physical Exam:  Physical Exam   Constitutional: He is oriented to person, place, and time. He appears well-developed. No distress.   Obese   HENT:   Head: Normocephalic and atraumatic.   Eyes: EOM are normal. Pupils are equal, round, and reactive to light.   Neck: Neck supple.   Cardiovascular: Normal rate and regular rhythm.    Pulmonary/Chest: Effort normal. No respiratory distress. He has no wheezes.   Abdominal: Soft. He exhibits no distension. There is no tenderness.   Musculoskeletal: He exhibits edema.   Serosanguinous drainage resolved  Right great toe amp site-incisional necrosis. Decreased edema + erythema  LUE PICC   Neurological: He is alert and oriented to person, place, and time.   Nursing note and vitals reviewed.      Meds:    Current Facility-Administered Medications:   •  PICC Line Insertion has been implemented **AND** May use Lidocaine 1% not to exceed 3 mls for local at insertion site **AND** NOTIFY MD **AND** Tip to dwell  in the superior vena cava **AND** Do not use PICC Line until placement verified by Chest X Ray **AND** DX-CHEST-FOR PICC LINE Perform procedure in: PICC Room **AND** If radiologist reading of chest X-ray states any of the following the PICC should be used **AND** Further evaluation of the PICC placement can be retrieved from X-Ray and Imaging **AND** Blood draws through PICC line; draws by RN only **AND** FLUSHING GUIDELINES WHEN IN USE **AND** normal saline PF **AND** FLUSHING GUIDELINES WHEN NOT IN USE **AND** DRESSING MAINTENANCE **AND** Change needleless pressure ports and IV tubing every 72 hours per hospital policy **AND** TUBING **AND** If there is an MD order to remove the PICC line, any RN may remove the PICC line **AND** [] PATIENT EDUCATION MATERIALS **AND** NURSING COMMUNICATION  •  potassium chloride SA  •  metformin  •  oxycodone immediate-release **OR** oxycodone immediate-release  •  senna-docusate **AND** polyethylene glycol/lytes **AND** magnesium hydroxide **AND** bisacodyl  •  enoxaparin  •  insulin regular **AND** Accu-Chek ACHS **AND** NOTIFY MD and PharmD **AND** glucose 4 g **AND** dextrose 50%  •  ondansetron  •  ondansetron  •  promethazine  •  promethazine  •  nystatin  •  ampicillin-sulbactam (UNASYN) IV  •  morphine injection    Labs:  Recent Labs      11/15/17   0339  11/16/17   0110   WBC  9.4  9.6   RBC  5.03  4.91   HEMOGLOBIN  14.0  13.4*   HEMATOCRIT  42.2  41.2*   MCV  83.9  83.9   MCH  27.8  27.3   RDW  40.7  40.7   PLATELETCT  297  296   MPV  9.9  9.8   NEUTSPOLYS  57.70  54.90   LYMPHOCYTES  29.90  33.50   MONOCYTES  7.80  7.40   EOSINOPHILS  2.80  3.00   BASOPHILS  0.60  0.60     Recent Labs      11/15/17   0339  11/16/17   0110   SODIUM  133*  132*   POTASSIUM  3.8  4.1   CHLORIDE  102  100   CO2  25  26   GLUCOSE  135*  126*   BUN  11  13     Recent Labs      11/15/17   0339  17   0110   ALBUMIN  2.6*  2.6*   TBILIRUBIN  0.6  0.5   ALKPHOSPHAT  66  66    TOTPROTEIN  6.6  6.7   ALTSGPT  13  16   ASTSGOT  19  18   CREATININE  0.45*  0.53       Imaging:  Dx-chest-portable (1 View)    Result Date: 11/10/2017  11/10/2017 3:43 PM HISTORY/REASON FOR EXAM:  Ground-level fall. Weakness. Shortness of breath. TECHNIQUE/EXAM DESCRIPTION AND NUMBER OF VIEWS: Single portable view of the chest. COMPARISON: None FINDINGS: No pneumothorax identified. There is no evidence of focal consolidation or evidence of pulmonary edema. There is no evidence of pleural effusion. The heart is normal in size.     No pneumothorax identified.    Dx-foot-complete 3+ Right    Result Date: 11/10/2017  11/10/2017 3:43 PM HISTORY/REASON FOR EXAM:  Right great toe pain. Open wound in the right great toe region. TECHNIQUE/EXAM DESCRIPTION AND NUMBER OF VIEWS: 3 views of the RIGHT foot. COMPARISON:  None. FINDINGS: There is subcutaneous air or gas in the plantar aspect of the right 1st toe region distally consistent with the open wound. No underlying bony lytic or blastic change is noted to suggest osteomyelitis. There is flexion deformity and lateral deviation of the right 2nd through 5th MTP joints. There is no evidence of displaced fracture or dislocation.     1.  Soft tissue gas consistent with open wound in the plantar aspect of the distal medial aspect of the right 1st toe. 2.  No plain film evidence of osteomyelitis.    Le Art/shayan    Result Date: 11/13/2017   Vascular Laboratory  Conclusions  No prior study is available for comparison.  Bilateral.  No evidence of arterial insufficiency.            SHAYAN                                           TBI  Findings  Bilateral:  Doppler waveforms of the common femoral artery are of high amplitude and  triphasic.  Doppler waveforms at the ankle are brisk and triphasic.  Ankle-brachial indices are normal.  Victor M Villalobos M.D.  (Electronically Signed)  Final Date:      13 November 2017                   22:51      Micro:  Results     Procedure Component  "Value Units Date/Time    BLOOD CULTURE [[id number]] Collected:  11/10/17 1452    Order Status:  Completed Specimen:  Blood from Peripheral Updated:  11/15/17 1700     Significant Indicator NEG     Source BLD     Site PERIPHERAL     Blood Culture No growth after 5 days of incubation.    Narrative:       Per Hospital Policy: Only change Specimen Src: to \"Line\" if  specified by physician order.    BLOOD CULTURE [[id number]] Collected:  11/10/17 1440    Order Status:  Completed Specimen:  Blood from Peripheral Updated:  11/15/17 1700     Significant Indicator NEG     Source BLD     Site PERIPHERAL     Blood Culture No growth after 5 days of incubation.    Narrative:       Per Hospital Policy: Only change Specimen Src: to \"Line\" if  specified by physician order.    CULTURE TISSUE W/ GRM STAIN [[id number]]  (Abnormal) Collected:  11/10/17 1917    Order Status:  Completed Specimen:  Tissue Updated:  11/13/17 1733     Gram Stain Result -- (A)     Rare WBCs.  Few Gram positive cocci.       Significant Indicator POS (POS)     Source TISS     Site Periosteal Right Great Toe     Tissue Culture -- (A)     Tissue Culture -- (A)     Proteus mirabilis  Light growth  See previous culture for sensitivity report.       Tissue Culture -- (A)     Streptococcus agalactiae (Group B)  Moderate growth      ANAEROBIC CULTURE [[id number]] Collected:  11/10/17 1917    Order Status:  Completed Specimen:  Tissue Updated:  11/13/17 1733     Significant Indicator NEG     Source TISS     Site Periosteal Right Great Toe     Anaerobic Culture, Culture Res No Anaerobes isolated.    CULTURE TISSUE W/ GRM STAIN [[id number]]  (Abnormal)  (Susceptibility) Collected:  11/10/17 1917    Order Status:  Completed Specimen:  Tissue Updated:  11/13/17 1732     Significant Indicator POS (POS)     Source TISS     Site Right Great Toe Deep Tissue     Tissue Culture -- (A)     Gram Stain Result -- (A)     Few WBCs.  Few Gram positive cocci.  Rare Gram negative rods.      "  Tissue Culture -- (A)     Proteus mirabilis  Moderate growth       Tissue Culture -- (A)     Streptococcus agalactiae (Group B)  Moderate growth      Culture & Susceptibility     PROTEUS MIRABILIS     Antibiotic Sensitivity Microscan Unit Status    Ampicillin Sensitive <=8 mcg/mL Final    Cefepime Sensitive <=8 mcg/mL Final    Cefotaxime Sensitive <=2 mcg/mL Final    Cefotetan Sensitive <=16 mcg/mL Final    Ceftazidime Sensitive <=1 mcg/mL Final    Ceftriaxone Sensitive <=8 mcg/mL Final    Cefuroxime Sensitive <=4 mcg/mL Final    Ciprofloxacin Sensitive <=1 mcg/mL Final    Ertapenem Sensitive <=1 mcg/mL Final    Gentamicin Sensitive <=4 mcg/mL Final    Pip/Tazobactam Sensitive <=16 mcg/mL Final    Tobramycin Sensitive <=4 mcg/mL Final    Trimeth/Sulfa Sensitive <=2/38 mcg/mL Final                       ANAEROBIC CULTURE [[id number]] Collected:  11/10/17 1917    Order Status:  Completed Specimen:  Tissue Updated:  11/13/17 1732     Significant Indicator NEG     Source TISS     Site Right Great Toe Deep Tissue     Anaerobic Culture, Culture Res No Anaerobes isolated.    URINE CULTURE(NEW) [[id number]] Collected:  11/11/17 0509    Order Status:  Completed Specimen:  Urine Updated:  11/13/17 0912     Significant Indicator NEG     Source UR     Site --     Urine Culture No growth at 48 hours    Narrative:       Indication for culture:->Emergency Room Patient    GRAM STAIN [[id number]] Collected:  11/10/17 1917    Order Status:  Completed Specimen:  Tissue Updated:  11/11/17 0845     Significant Indicator .     Source TISS     Site Periosteal Right Great Toe     Gram Stain Result --     Rare WBCs.  Few Gram positive cocci.      GRAM STAIN [[id number]] Collected:  11/10/17 1917    Order Status:  Completed Specimen:  Tissue Updated:  11/11/17 0845     Significant Indicator .     Source TISS     Site Right Great Toe Deep Tissue     Gram Stain Result --     Few WBCs.  Few Gram positive cocci.  Rare Gram negative rods.       URINALYSIS [[id number]]  (Abnormal) Collected:  11/11/17 0509    Order Status:  Completed Specimen:  Urine Updated:  11/11/17 0533     Color DK Yellow     Character Cloudy (A)     Specific Gravity 1.024     Ph 5.0     Glucose Negative mg/dL      Ketones Negative mg/dL      Protein 30 (A) mg/dL      Bilirubin Small (A)     Urobilinogen, Urine 1.0     Nitrite Negative     Leukocyte Esterase Negative     Occult Blood Negative     Micro Urine Req Microscopic    Narrative:       Indication for culture:->Emergency Room Patient          Assessment:  Active Hospital Problems    Diagnosis   • *Severe sepsis (CMS-HCC) [A41.9, R65.20]   • Gangrene of toe of right foot (CMS-HCC) [I96]   • Candidal intertrigo [B37.2]   • Hyponatremia [E87.1]   • Class 3 obesity with serious comorbidity in adult [E66.9]   • Hyperglycemia [R73.9]   • Hypokalemia [E87.6]       Plan:  Gangrene of toe of right foot (CMS-HCC)-   S/p right great toe amputation 11-10-17   Cxs are group B strep and Proteus  Path shows ulceration to margin  On Unasyn in the hosp  Plan for 6 weeks IV abx- ertapenem  Stop date 12/22/2017-give day of discharge  Wound care to continue  Orders faxed to  11/15    Sepsis (CMS-HCC)- improved  Due to gangrene-s/p amp  Fever resolved  Leukocytosis resolved  Lactic acidosis resolved  Continue abx as above  Blood cxs neg 11/11      Nausea, new  Improved with Zofran  No associated abd pain  Monitor    Diabetes- (present on admission)  Keep BS under 150 to help control current infection  HbA1c is  9.2    Discussed with LPS

## 2017-11-17 NOTE — PROGRESS NOTES
[facility] Hospitalist Progress Note    Date of Service: 11/17/2017    Chief Complaint  52 y.o. Male  admitted 11/10/2017 with right great toe swelling, pain . Dx Severe sepsis w Gangrene Rt great Toe- post  Right Great Toe Amputation 11-10-17     Interval Problem Update  Patient doing well clinically, no acute complaints. Patient denies fevers/chills, chest pain, shortness of breath or nausea/vommiting.   Continue wound care  continue IV Unasyn, cultures group B strep, ID following.    11/15  Patient doing well overall, no gross changes clinically.  Continue IV Unasyn.  ID following,   Continue wound care.    11/16  Patient doing well, had nausea this morning, says doesn't know if he is able to keep PO down. Continue IV zofran prn.  Continue antibiotics.  Outpatient infusion being set up.    11/17  Patient still complaining of nausea and had episode of vomiting, although feels better than yesterday, is worried that he may come back to the hospital if nausea persists.   Continue prn zofran, compazine.   Outpatient infusion set up for confirmation.  Outpatient wound care.    Consultants/Specialty  Orthopedic surgery Dr. Leo  ID .    Disposition  TBD        Review of Systems   Constitutional: Negative for chills, diaphoresis, fever and malaise/fatigue.   HENT: Negative for congestion, hearing loss, sore throat and tinnitus.    Eyes: Negative for blurred vision, double vision, photophobia, pain, discharge and redness.   Respiratory: Negative for cough, hemoptysis, sputum production, shortness of breath, wheezing and stridor.    Cardiovascular: Positive for leg swelling. Negative for chest pain, palpitations, orthopnea, claudication and PND.   Gastrointestinal: Positive for nausea and vomiting. Negative for abdominal pain, blood in stool, constipation, heartburn and melena.   Genitourinary: Negative for dysuria, frequency and urgency.   Musculoskeletal: Positive for joint pain. Negative for back pain, myalgias  and neck pain.        Improved.   Skin: Negative for itching and rash.   Neurological: Positive for tingling (chronic in feet.). Negative for dizziness, tremors, sensory change, speech change, weakness and headaches.   Endo/Heme/Allergies: Does not bruise/bleed easily.   Psychiatric/Behavioral: Negative for depression, memory loss and suicidal ideas. The patient is not nervous/anxious.       Physical Exam  Laboratory/Imaging   Hemodynamics  Temp (24hrs), Av.8 °C (98.2 °F), Min:36.7 °C (98 °F), Max:37 °C (98.6 °F)   Temperature: 37 °C (98.6 °F)  Pulse  Av.8  Min: 70  Max: 120    Blood Pressure: (!) 167/77      Respiratory      Respiration: 16, Pulse Oximetry: 95 %             Fluids    Intake/Output Summary (Last 24 hours) at 17 0949  Last data filed at 17 0800   Gross per 24 hour   Intake              100 ml   Output             1300 ml   Net            -1200 ml       Nutrition  Orders Placed This Encounter   Procedures   • Diet Order     Standing Status:   Standing     Number of Occurrences:   1     Order Specific Question:   Diet:     Answer:   Diabetic [3]     Physical Exam   Constitutional: He is oriented to person, place, and time. He appears well-developed and well-nourished. No distress.   HENT:   Head: Normocephalic and atraumatic.   Mouth/Throat: No oropharyngeal exudate.   Eyes: Conjunctivae and EOM are normal. Pupils are equal, round, and reactive to light. Right eye exhibits no discharge. No scleral icterus.   Neck: Neck supple. No JVD present. No thyromegaly present.   Cardiovascular: Normal rate, regular rhythm and intact distal pulses.    No murmur heard.  Pulmonary/Chest: Effort normal and breath sounds normal. No stridor. No respiratory distress. He has no wheezes. He has no rales.   Abdominal: Soft. Bowel sounds are normal. He exhibits no distension. There is no tenderness. There is no rebound.   Obese.    Musculoskeletal: Normal range of motion. He exhibits edema (trace b/l  L/e edema) and tenderness (right toe area on palpation, improving).   Right great Toe amputation no erythema, sutures in place dressing intact, dry and clean, edema down significantly     Neurological: He is alert and oriented to person, place, and time. No cranial nerve deficit.   Skin: Skin is warm and dry. No rash noted. He is not diaphoretic. No erythema.   Psychiatric: He has a normal mood and affect. His behavior is normal. Thought content normal.   Nursing note and vitals reviewed.      Recent Labs      11/15/17   0339  11/16/17   0110   WBC  9.4  9.6   RBC  5.03  4.91   HEMOGLOBIN  14.0  13.4*   HEMATOCRIT  42.2  41.2*   MCV  83.9  83.9   MCH  27.8  27.3   MCHC  33.2*  32.5*   RDW  40.7  40.7   PLATELETCT  297  296   MPV  9.9  9.8     Recent Labs      11/15/17   0339  11/16/17   0110   SODIUM  133*  132*   POTASSIUM  3.8  4.1   CHLORIDE  102  100   CO2  25  26   GLUCOSE  135*  126*   BUN  11  13   CREATININE  0.45*  0.53   CALCIUM  7.9*  8.0*                      Assessment/Plan     * Severe sepsis (CMS-HCC)- (present on admission)   Assessment & Plan    This is severe sepsis with organ dysfunction including musculoskeletal system  2/2 to right toe gangrene s/p amputation.  Cultures + group B Strep.  Continue IV unasyn per ID for now.  Needs Plan for 6 weeks IV abx with ertapenem  Wound care  Sepsis resolved          Gangrene of toe of right foot (CMS-HCC)- (present on admission)   Assessment & Plan    Post Toe amputation 11-10-17   Continue IV Unasyn for cellulitis,as above.  ID following.  Strict Diabetes control  Wound care to continue        Hypokalemia- (present on admission)   Assessment & Plan    Resolved  Follow daily bmp        Hyperglycemia- (present on admission)   Assessment & Plan    Due to type II diabetes mellitus- newly dx.  HbA1c is  9.2  Continue Metformin  Continue Insulin-sliding scale, accu-checks and hypoglycemia protocol.  Diabetic Education        Class 3 obesity with serious  comorbidity in adult   Assessment & Plan    Counseling provided on diet and exercise.         Hyponatremia- (present on admission)   Assessment & Plan     continue to monitor. encouarge PO intake.      Candidal intertrigo- (present on admission)   Assessment & Plan    Nystatin powder affected areas        Hypomagnesemia  resolved    Patient plan of care discussed at multidisplinary team rounds and with patient and R.N at beside.         Reviewed items::  Labs reviewed, Medications reviewed and Radiology images reviewed  Zabala catheter::  No Zabala  DVT prophylaxis pharmacological::  Enoxaparin (Lovenox)  Ulcer Prophylaxis::  Not indicated  Antibiotics:  Treating active infection/contamination beyond 24 hours perioperative coverage

## 2017-11-18 ENCOUNTER — APPOINTMENT (OUTPATIENT)
Dept: ONCOLOGY | Facility: MEDICAL CENTER | Age: 52
End: 2017-11-18
Attending: INTERNAL MEDICINE
Payer: MEDICAID

## 2017-11-18 LAB
ALBUMIN SERPL BCP-MCNC: 2.7 G/DL (ref 3.2–4.9)
ALBUMIN/GLOB SERPL: 0.7 G/DL
ALP SERPL-CCNC: 59 U/L (ref 30–99)
ALT SERPL-CCNC: 20 U/L (ref 2–50)
ANION GAP SERPL CALC-SCNC: 7 MMOL/L (ref 0–11.9)
AST SERPL-CCNC: 20 U/L (ref 12–45)
BASOPHILS # BLD AUTO: 0.6 % (ref 0–1.8)
BASOPHILS # BLD: 0.06 K/UL (ref 0–0.12)
BILIRUB SERPL-MCNC: 0.5 MG/DL (ref 0.1–1.5)
BUN SERPL-MCNC: 15 MG/DL (ref 8–22)
CALCIUM SERPL-MCNC: 8.7 MG/DL (ref 8.5–10.5)
CHLORIDE SERPL-SCNC: 100 MMOL/L (ref 96–112)
CO2 SERPL-SCNC: 26 MMOL/L (ref 20–33)
CREAT SERPL-MCNC: 0.63 MG/DL (ref 0.5–1.4)
EOSINOPHIL # BLD AUTO: 0.24 K/UL (ref 0–0.51)
EOSINOPHIL NFR BLD: 2.5 % (ref 0–6.9)
ERYTHROCYTE [DISTWIDTH] IN BLOOD BY AUTOMATED COUNT: 39.7 FL (ref 35.9–50)
GFR SERPL CREATININE-BSD FRML MDRD: >60 ML/MIN/1.73 M 2
GLOBULIN SER CALC-MCNC: 3.9 G/DL (ref 1.9–3.5)
GLUCOSE BLD-MCNC: 130 MG/DL (ref 65–99)
GLUCOSE BLD-MCNC: 136 MG/DL (ref 65–99)
GLUCOSE BLD-MCNC: 147 MG/DL (ref 65–99)
GLUCOSE BLD-MCNC: 149 MG/DL (ref 65–99)
GLUCOSE SERPL-MCNC: 124 MG/DL (ref 65–99)
HCT VFR BLD AUTO: 41.4 % (ref 42–52)
HGB BLD-MCNC: 13.3 G/DL (ref 14–18)
IMM GRANULOCYTES # BLD AUTO: 0.03 K/UL (ref 0–0.11)
IMM GRANULOCYTES NFR BLD AUTO: 0.3 % (ref 0–0.9)
LYMPHOCYTES # BLD AUTO: 3.14 K/UL (ref 1–4.8)
LYMPHOCYTES NFR BLD: 32.6 % (ref 22–41)
MAGNESIUM SERPL-MCNC: 1.8 MG/DL (ref 1.5–2.5)
MCH RBC QN AUTO: 27 PG (ref 27–33)
MCHC RBC AUTO-ENTMCNC: 32.1 G/DL (ref 33.7–35.3)
MCV RBC AUTO: 84 FL (ref 81.4–97.8)
MONOCYTES # BLD AUTO: 0.65 K/UL (ref 0–0.85)
MONOCYTES NFR BLD AUTO: 6.8 % (ref 0–13.4)
NEUTROPHILS # BLD AUTO: 5.5 K/UL (ref 1.82–7.42)
NEUTROPHILS NFR BLD: 57.2 % (ref 44–72)
NRBC # BLD AUTO: 0 K/UL
NRBC BLD AUTO-RTO: 0 /100 WBC
PLATELET # BLD AUTO: 295 K/UL (ref 164–446)
PMV BLD AUTO: 9.8 FL (ref 9–12.9)
POTASSIUM SERPL-SCNC: 4 MMOL/L (ref 3.6–5.5)
PROT SERPL-MCNC: 6.6 G/DL (ref 6–8.2)
RBC # BLD AUTO: 4.93 M/UL (ref 4.7–6.1)
SODIUM SERPL-SCNC: 133 MMOL/L (ref 135–145)
WBC # BLD AUTO: 9.6 K/UL (ref 4.8–10.8)

## 2017-11-18 PROCEDURE — 770006 HCHG ROOM/CARE - MED/SURG/GYN SEMI*

## 2017-11-18 PROCEDURE — A9270 NON-COVERED ITEM OR SERVICE: HCPCS | Performed by: INTERNAL MEDICINE

## 2017-11-18 PROCEDURE — 700111 HCHG RX REV CODE 636 W/ 250 OVERRIDE (IP): Performed by: HOSPITALIST

## 2017-11-18 PROCEDURE — 80053 COMPREHEN METABOLIC PANEL: CPT

## 2017-11-18 PROCEDURE — 85025 COMPLETE CBC W/AUTO DIFF WBC: CPT

## 2017-11-18 PROCEDURE — 700105 HCHG RX REV CODE 258: Performed by: HOSPITALIST

## 2017-11-18 PROCEDURE — 700102 HCHG RX REV CODE 250 W/ 637 OVERRIDE(OP): Performed by: HOSPITALIST

## 2017-11-18 PROCEDURE — 83735 ASSAY OF MAGNESIUM: CPT

## 2017-11-18 PROCEDURE — 99232 SBSQ HOSP IP/OBS MODERATE 35: CPT | Performed by: HOSPITALIST

## 2017-11-18 PROCEDURE — A9270 NON-COVERED ITEM OR SERVICE: HCPCS | Performed by: HOSPITALIST

## 2017-11-18 PROCEDURE — 82962 GLUCOSE BLOOD TEST: CPT

## 2017-11-18 PROCEDURE — 700102 HCHG RX REV CODE 250 W/ 637 OVERRIDE(OP): Performed by: INTERNAL MEDICINE

## 2017-11-18 RX ADMIN — AMPICILLIN SODIUM AND SULBACTAM SODIUM 3 G: 2; 1 INJECTION, POWDER, FOR SOLUTION INTRAMUSCULAR; INTRAVENOUS at 05:09

## 2017-11-18 RX ADMIN — ENOXAPARIN SODIUM 40 MG: 100 INJECTION SUBCUTANEOUS at 08:50

## 2017-11-18 RX ADMIN — AMPICILLIN SODIUM AND SULBACTAM SODIUM 3 G: 2; 1 INJECTION, POWDER, FOR SOLUTION INTRAMUSCULAR; INTRAVENOUS at 23:58

## 2017-11-18 RX ADMIN — METFORMIN HYDROCHLORIDE 500 MG: 500 TABLET, FILM COATED ORAL at 17:36

## 2017-11-18 RX ADMIN — OXYCODONE HYDROCHLORIDE 10 MG: 10 TABLET ORAL at 14:58

## 2017-11-18 RX ADMIN — AMPICILLIN SODIUM AND SULBACTAM SODIUM 3 G: 2; 1 INJECTION, POWDER, FOR SOLUTION INTRAMUSCULAR; INTRAVENOUS at 11:31

## 2017-11-18 RX ADMIN — NYSTATIN 1500000 UNITS: 100000 POWDER TOPICAL at 20:22

## 2017-11-18 RX ADMIN — POTASSIUM CHLORIDE 40 MEQ: 1500 TABLET, EXTENDED RELEASE ORAL at 08:51

## 2017-11-18 RX ADMIN — OXYCODONE HYDROCHLORIDE 10 MG: 10 TABLET ORAL at 03:02

## 2017-11-18 RX ADMIN — METFORMIN HYDROCHLORIDE 500 MG: 500 TABLET, FILM COATED ORAL at 08:50

## 2017-11-18 RX ADMIN — OXYCODONE HYDROCHLORIDE 10 MG: 10 TABLET ORAL at 20:22

## 2017-11-18 RX ADMIN — AMPICILLIN SODIUM AND SULBACTAM SODIUM 3 G: 2; 1 INJECTION, POWDER, FOR SOLUTION INTRAMUSCULAR; INTRAVENOUS at 17:36

## 2017-11-18 RX ADMIN — OXYCODONE HYDROCHLORIDE 10 MG: 10 TABLET ORAL at 08:52

## 2017-11-18 ASSESSMENT — ENCOUNTER SYMPTOMS
SPEECH CHANGE: 0
ABDOMINAL PAIN: 0
TREMORS: 0
CLAUDICATION: 0
CONSTIPATION: 0
DOUBLE VISION: 0
EYE DISCHARGE: 0
PALPITATIONS: 0
COUGH: 0
EYE REDNESS: 0
DEPRESSION: 0
PND: 0
BRUISES/BLEEDS EASILY: 0
MYALGIAS: 0
DIAPHORESIS: 0
ORTHOPNEA: 0
HEADACHES: 0
DIARRHEA: 0
NECK PAIN: 0
BLOOD IN STOOL: 0
TINGLING: 1
HEMOPTYSIS: 0
HEARTBURN: 0
BACK PAIN: 0
SHORTNESS OF BREATH: 0
BLURRED VISION: 0
FEVER: 0
NERVOUS/ANXIOUS: 0
STRIDOR: 0
EYE PAIN: 0
SENSORY CHANGE: 0
NAUSEA: 0
WHEEZING: 0
DIZZINESS: 0
MEMORY LOSS: 0
PHOTOPHOBIA: 0
SORE THROAT: 0
SPUTUM PRODUCTION: 0
WEAKNESS: 0
VOMITING: 0
CHILLS: 0

## 2017-11-18 ASSESSMENT — PAIN SCALES - GENERAL
PAINLEVEL_OUTOF10: 2
PAINLEVEL_OUTOF10: 7

## 2017-11-18 NOTE — DISCHARGE PLANNING
Medical Social Work    Per page, pt is in need of HH. Shai reviewed chart and pt is pending [place] Medicaid. SHAI spoke with HUSSEIN Beavers who stated no HH will accept pt without payor source. Pt is unable to go home since there is nobody to help him out.    Plan: Possible [facility] for HH.

## 2017-11-18 NOTE — CARE PLAN
Problem: Safety  Goal: Will remain free from falls  Outcome: PROGRESSING AS EXPECTED  Call light within reach.    Problem: Pain Management  Goal: Pain level will decrease to patient's comfort goal  Outcome: PROGRESSING AS EXPECTED  Will give pain medication according to MD orders

## 2017-11-18 NOTE — PROGRESS NOTES
Hourly rounding, call bell within reach. Patient educated about plan of care, pain management, call bell use, and mobility. Patient was able to ambulate with off loading boot in place to right foot. Patient calls for assistance. Showered today. Blood sugar checks prior to meals. Patient alert and oriented, vital signs stable. Pain controlled with PRN medications. Patients biggest complaint today was nausea. Patient was pre-medicated prior to meals which seemed to help him not be nauseous. Will continue to monitor.

## 2017-11-19 ENCOUNTER — APPOINTMENT (OUTPATIENT)
Dept: ONCOLOGY | Facility: MEDICAL CENTER | Age: 52
End: 2017-11-19
Attending: INTERNAL MEDICINE
Payer: MEDICAID

## 2017-11-19 LAB
GLUCOSE BLD-MCNC: 118 MG/DL (ref 65–99)
GLUCOSE BLD-MCNC: 120 MG/DL (ref 65–99)
GLUCOSE BLD-MCNC: 121 MG/DL (ref 65–99)
GLUCOSE BLD-MCNC: 132 MG/DL (ref 65–99)

## 2017-11-19 PROCEDURE — A9270 NON-COVERED ITEM OR SERVICE: HCPCS | Performed by: HOSPITALIST

## 2017-11-19 PROCEDURE — 700102 HCHG RX REV CODE 250 W/ 637 OVERRIDE(OP): Performed by: HOSPITALIST

## 2017-11-19 PROCEDURE — A9270 NON-COVERED ITEM OR SERVICE: HCPCS | Performed by: INTERNAL MEDICINE

## 2017-11-19 PROCEDURE — 770006 HCHG ROOM/CARE - MED/SURG/GYN SEMI*

## 2017-11-19 PROCEDURE — 700111 HCHG RX REV CODE 636 W/ 250 OVERRIDE (IP): Performed by: HOSPITALIST

## 2017-11-19 PROCEDURE — 700102 HCHG RX REV CODE 250 W/ 637 OVERRIDE(OP): Performed by: INTERNAL MEDICINE

## 2017-11-19 PROCEDURE — 82962 GLUCOSE BLOOD TEST: CPT | Mod: 91

## 2017-11-19 PROCEDURE — 99232 SBSQ HOSP IP/OBS MODERATE 35: CPT | Performed by: HOSPITALIST

## 2017-11-19 PROCEDURE — 700105 HCHG RX REV CODE 258: Performed by: HOSPITALIST

## 2017-11-19 RX ADMIN — OXYCODONE HYDROCHLORIDE 10 MG: 10 TABLET ORAL at 01:20

## 2017-11-19 RX ADMIN — OXYCODONE HYDROCHLORIDE 10 MG: 10 TABLET ORAL at 10:18

## 2017-11-19 RX ADMIN — OXYCODONE HYDROCHLORIDE 10 MG: 10 TABLET ORAL at 05:29

## 2017-11-19 RX ADMIN — OXYCODONE HYDROCHLORIDE 10 MG: 10 TABLET ORAL at 15:10

## 2017-11-19 RX ADMIN — NYSTATIN 1500000 UNITS: 100000 POWDER TOPICAL at 20:37

## 2017-11-19 RX ADMIN — METFORMIN HYDROCHLORIDE 500 MG: 500 TABLET, FILM COATED ORAL at 07:59

## 2017-11-19 RX ADMIN — AMPICILLIN SODIUM AND SULBACTAM SODIUM 3 G: 2; 1 INJECTION, POWDER, FOR SOLUTION INTRAMUSCULAR; INTRAVENOUS at 05:28

## 2017-11-19 RX ADMIN — OXYCODONE HYDROCHLORIDE 10 MG: 10 TABLET ORAL at 20:36

## 2017-11-19 RX ADMIN — METFORMIN HYDROCHLORIDE 500 MG: 500 TABLET, FILM COATED ORAL at 17:24

## 2017-11-19 RX ADMIN — AMPICILLIN SODIUM AND SULBACTAM SODIUM 3 G: 2; 1 INJECTION, POWDER, FOR SOLUTION INTRAMUSCULAR; INTRAVENOUS at 13:23

## 2017-11-19 RX ADMIN — NYSTATIN 1500000 UNITS: 100000 POWDER TOPICAL at 08:01

## 2017-11-19 RX ADMIN — AMPICILLIN SODIUM AND SULBACTAM SODIUM 3 G: 2; 1 INJECTION, POWDER, FOR SOLUTION INTRAMUSCULAR; INTRAVENOUS at 17:24

## 2017-11-19 RX ADMIN — ENOXAPARIN SODIUM 40 MG: 100 INJECTION SUBCUTANEOUS at 08:00

## 2017-11-19 ASSESSMENT — ENCOUNTER SYMPTOMS
TREMORS: 0
TINGLING: 1
BLURRED VISION: 0
SORE THROAT: 0
SENSORY CHANGE: 0
VOMITING: 0
WEAKNESS: 0
EYE DISCHARGE: 0
HEARTBURN: 0
BRUISES/BLEEDS EASILY: 0
MYALGIAS: 0
EYE REDNESS: 0
NAUSEA: 0
DOUBLE VISION: 0
BLOOD IN STOOL: 0
PHOTOPHOBIA: 0
DIZZINESS: 0
PND: 0
CHILLS: 0
SPUTUM PRODUCTION: 0
ABDOMINAL PAIN: 0
SHORTNESS OF BREATH: 0
HEADACHES: 0
PALPITATIONS: 0
BACK PAIN: 0
COUGH: 0
NERVOUS/ANXIOUS: 0
DIAPHORESIS: 0
FEVER: 0
CONSTIPATION: 0
HEMOPTYSIS: 0
MEMORY LOSS: 0
STRIDOR: 0
CLAUDICATION: 0
DEPRESSION: 0
WHEEZING: 0
NECK PAIN: 0
EYE PAIN: 0
SPEECH CHANGE: 0
ORTHOPNEA: 0

## 2017-11-19 ASSESSMENT — PAIN SCALES - GENERAL
PAINLEVEL_OUTOF10: 7
PAINLEVEL_OUTOF10: 7
PAINLEVEL_OUTOF10: 0

## 2017-11-19 NOTE — PROGRESS NOTES
[facility] Hospitalist Progress Note    Date of Service: 11/18/2017    Chief Complaint  52 y.o. Male  admitted 11/10/2017 with right great toe swelling, pain . Dx Severe sepsis w Gangrene Rt great Toe- post  Right Great Toe Amputation 11-10-17     Interval Problem Update  Patient doing well clinically, no acute complaints. Patient denies fevers/chills, chest pain, shortness of breath or nausea/vommiting.   Continue wound care  continue IV Unasyn, cultures group B strep, ID following.    11/15  Patient doing well overall, no gross changes clinically.  Continue IV Unasyn.  ID following,   Continue wound care.    11/16  Patient doing well, had nausea this morning, says doesn't know if he is able to keep PO down. Continue IV zofran prn.  Continue antibiotics.  Outpatient infusion being set up.    11/17  Patient still complaining of nausea and had episode of vomiting, although feels better than yesterday, is worried that he may come back to the hospital if nausea persists.   Continue prn zofran, compazine.   Outpatient infusion set up for confirmation.  Outpatient wound care.    11/18  No acute issues, patient reports his nausea has resolved. He also said that his room mate has moved out so he would be alone, and has no one to help him. He is afraid that he cannot take care of himself. Reached out to social work, given patient is pending for medicaid, and no home health will accept patient without payor source. Think that patient is a high risk. Otherwise Patient denies fevers/chills, chest pain, shortness of breath or nausea/vommiting.   Continue IV unasyn for now per ID.    Consultants/Specialty  Orthopedic surgery Dr. Leo  ID .    Disposition  TBD        Review of Systems   Constitutional: Negative for chills, diaphoresis, fever and malaise/fatigue.   HENT: Negative for congestion, hearing loss, sore throat and tinnitus.    Eyes: Negative for blurred vision, double vision, photophobia, pain, discharge and  redness.   Respiratory: Negative for cough, hemoptysis, sputum production, shortness of breath, wheezing and stridor.    Cardiovascular: Positive for leg swelling (unchanged). Negative for chest pain, palpitations, orthopnea, claudication and PND.   Gastrointestinal: Negative for abdominal pain, blood in stool, constipation, heartburn, melena, nausea and vomiting.   Genitourinary: Negative for dysuria, frequency and urgency.   Musculoskeletal: Positive for joint pain. Negative for back pain, myalgias and neck pain.        Improved.   Skin: Negative for itching and rash.   Neurological: Positive for tingling (chronic in feet.). Negative for dizziness, tremors, sensory change, speech change, weakness and headaches.   Endo/Heme/Allergies: Does not bruise/bleed easily.   Psychiatric/Behavioral: Negative for depression, memory loss and suicidal ideas. The patient is not nervous/anxious.       Physical Exam  Laboratory/Imaging   Hemodynamics  Temp (24hrs), Av.4 °C (97.6 °F), Min:36.3 °C (97.3 °F), Max:36.6 °C (97.9 °F)   Temperature: 36.3 °C (97.3 °F)  Pulse  Av  Min: 70  Max: 120    Blood Pressure: 142/72      Respiratory      Respiration: 17, Pulse Oximetry: 94 %             Fluids    Intake/Output Summary (Last 24 hours) at 17 1619  Last data filed at 17 1600   Gross per 24 hour   Intake              960 ml   Output             1300 ml   Net             -340 ml       Nutrition  Orders Placed This Encounter   Procedures   • Diet Order     Standing Status:   Standing     Number of Occurrences:   1     Order Specific Question:   Diet:     Answer:   Diabetic [3]     Physical Exam   Constitutional: He is oriented to person, place, and time. He appears well-developed and well-nourished. No distress.   HENT:   Head: Normocephalic and atraumatic.   Mouth/Throat: No oropharyngeal exudate.   Eyes: Conjunctivae and EOM are normal. Pupils are equal, round, and reactive to light. Right eye exhibits no  discharge. No scleral icterus.   Neck: Neck supple. No JVD present. No thyromegaly present.   Cardiovascular: Normal rate, regular rhythm and intact distal pulses.    No murmur heard.  Pulmonary/Chest: Effort normal and breath sounds normal. No stridor. No respiratory distress. He has no wheezes. He has no rales.   Abdominal: Soft. Bowel sounds are normal. He exhibits no distension. There is no tenderness. There is no rebound.   Obese.    Musculoskeletal: Normal range of motion. He exhibits edema (trace b/l L/e edema unchanged) and tenderness (right toe area on palpation, unchanged.).   Right great Toe amputation no erythema, sutures in place dressing intact, dry and clean, edema down significantly     Neurological: He is alert and oriented to person, place, and time. No cranial nerve deficit.   Skin: Skin is warm and dry. No rash noted. He is not diaphoretic. No erythema.   Psychiatric: He has a normal mood and affect. His behavior is normal. Thought content normal.   Nursing note and vitals reviewed.      Recent Labs      11/16/17   0110  11/18/17   0300   WBC  9.6  9.6   RBC  4.91  4.93   HEMOGLOBIN  13.4*  13.3*   HEMATOCRIT  41.2*  41.4*   MCV  83.9  84.0   MCH  27.3  27.0   MCHC  32.5*  32.1*   RDW  40.7  39.7   PLATELETCT  296  295   MPV  9.8  9.8     Recent Labs      11/16/17   0110  11/18/17   0300   SODIUM  132*  133*   POTASSIUM  4.1  4.0   CHLORIDE  100  100   CO2  26  26   GLUCOSE  126*  124*   BUN  13  15   CREATININE  0.53  0.63   CALCIUM  8.0*  8.7                      Assessment/Plan     * Severe sepsis (CMS-HCC)- (present on admission)   Assessment & Plan    This is severe sepsis with organ dysfunction including musculoskeletal system  2/2 to right toe gangrene s/p amputation.  Cultures + group B Strep.  Continue IV unasyn per ID for now.  Needs Plan for 6 weeks IV abx with ertapenem  Wound care  Sepsis resolved          Gangrene of toe of right foot (CMS-HCC)- (present on admission)   Assessment &  Plan    Post Toe amputation 11-10-17   Continue IV Unasyn for cellulitis,as above.  ID following.  Strict Diabetes control  Wound care to continue        Hypokalemia- (present on admission)   Assessment & Plan    Resolved  Follow daily bmp        Hyperglycemia- (present on admission)   Assessment & Plan    Due to type II diabetes mellitus- newly dx.  HbA1c is  9.2  Continue Metformin  Continue Insulin-sliding scale, accu-checks and hypoglycemia protocol.  Diabetic Education        Class 3 obesity with serious comorbidity in adult   Assessment & Plan    Counseling provided on diet and exercise.         Hyponatremia- (present on admission)   Assessment & Plan     continue to monitor. encouarge PO intake. Improving marginally.       Candidal intertrigo- (present on admission)   Assessment & Plan    Nystatin powder affected areas        Hypomagnesemia  resolved    Patient plan of care discussed at multidisplinary team rounds and with patient and R.N at [facility].         Reviewed items::  Labs reviewed, Medications reviewed and Radiology images reviewed  Zabala catheter::  No Zabala  DVT prophylaxis pharmacological::  Enoxaparin (Lovenox)  Ulcer Prophylaxis::  Not indicated  Antibiotics:  Treating active infection/contamination beyond 24 hours perioperative coverage

## 2017-11-19 NOTE — PROGRESS NOTES
[facility] Hospitalist Progress Note    Date of Service: 11/19/2017    Chief Complaint  52 y.o. Male  admitted 11/10/2017 with right great toe swelling, pain . Dx Severe sepsis w Gangrene Rt great Toe- post  Right Great Toe Amputation 11-10-17     Interval Problem Update  Patient doing well clinically, no acute complaints. Patient denies fevers/chills, chest pain, shortness of breath or nausea/vommiting.   Continue wound care  continue IV Unasyn, cultures group B strep, ID following.    11/15  Patient doing well overall, no gross changes clinically.  Continue IV Unasyn.  ID following,   Continue wound care.    11/16  Patient doing well, had nausea this morning, says doesn't know if he is able to keep PO down. Continue IV zofran prn.  Continue antibiotics.  Outpatient infusion being set up.    11/17  Patient still complaining of nausea and had episode of vomiting, although feels better than yesterday, is worried that he may come back to the hospital if nausea persists.   Continue prn zofran, compazine.   Outpatient infusion set up for confirmation.  Outpatient wound care.    11/18  No acute issues, patient reports his nausea has resolved. He also said that his room mate has moved out so he would be alone, and has no one to help him. He is afraid that he cannot take care of himself. Reached out to social work, given patient is pending for medicaid, and no home health will accept patient without payor source. Think that patient is a high risk. Otherwise Patient denies fevers/chills, chest pain, shortness of breath or nausea/vommiting.   Continue IV unasyn for now per ID.    11/19  Patient comfortable Patient denies fevers/chills, chest pain, shortness of breath or nausea/vommiting.   Pending  on Home, health, outpatient antibiotic infusion setup. Pending for Medicaid    Consultants/Specialty  Orthopedic surgery Dr. Leo  ID .    Disposition  TBD        Review of Systems   Constitutional: Negative for  chills, diaphoresis, fever and malaise/fatigue.   HENT: Negative for congestion, hearing loss, sore throat and tinnitus.    Eyes: Negative for blurred vision, double vision, photophobia, pain, discharge and redness.   Respiratory: Negative for cough, hemoptysis, sputum production, shortness of breath, wheezing and stridor.    Cardiovascular: Positive for leg swelling (unchanged). Negative for chest pain, palpitations, orthopnea, claudication and PND.   Gastrointestinal: Negative for abdominal pain, blood in stool, constipation, heartburn, melena, nausea and vomiting.   Genitourinary: Negative for dysuria, frequency and urgency.   Musculoskeletal: Positive for joint pain. Negative for back pain, myalgias and neck pain.        Improved.   Skin: Negative for itching and rash.   Neurological: Positive for tingling (chronic in feet.). Negative for dizziness, tremors, sensory change, speech change, weakness and headaches.   Endo/Heme/Allergies: Does not bruise/bleed easily.   Psychiatric/Behavioral: Negative for depression, memory loss and suicidal ideas. The patient is not nervous/anxious.       Physical Exam  Laboratory/Imaging   Hemodynamics  Temp (24hrs), Av.6 °C (97.8 °F), Min:36.3 °C (97.3 °F), Max:36.7 °C (98.1 °F)   Temperature:  (Q8 vitials)  Pulse  Av.4  Min: 70  Max: 120    Blood Pressure: 145/76      Respiratory      Respiration: 18, Pulse Oximetry: 95 %             Fluids    Intake/Output Summary (Last 24 hours) at 17 1414  Last data filed at 17 1200   Gross per 24 hour   Intake              960 ml   Output              850 ml   Net              110 ml       Nutrition  Orders Placed This Encounter   Procedures   • Diet Order     Standing Status:   Standing     Number of Occurrences:   1     Order Specific Question:   Diet:     Answer:   Diabetic [3]     Physical Exam   Constitutional: He is oriented to person, place, and time. He appears well-developed and well-nourished. No distress.    HENT:   Head: Normocephalic and atraumatic.   Mouth/Throat: No oropharyngeal exudate.   Eyes: Conjunctivae and EOM are normal. Pupils are equal, round, and reactive to light. Right eye exhibits no discharge. Left eye exhibits no discharge. No scleral icterus.   Neck: Neck supple. No JVD present. No thyromegaly present.   Cardiovascular: Normal rate, regular rhythm and intact distal pulses.    No murmur heard.  Pulmonary/Chest: Effort normal and breath sounds normal. No stridor. No respiratory distress. He has no wheezes. He has no rales.   Abdominal: Soft. Bowel sounds are normal. He exhibits no distension. There is no tenderness. There is no rebound.   Obese.    Musculoskeletal: Normal range of motion. He exhibits edema (trace b/l L/e edema unchanged) and tenderness (right toe area on palpation, minimal).   Right great Toe amputation no erythema, sutures in place dressing intact, dry and clean, edema down significantly     Neurological: He is alert and oriented to person, place, and time. No cranial nerve deficit.   Skin: Skin is warm and dry. No rash noted. He is not diaphoretic. No erythema.   Psychiatric: He has a normal mood and affect. His behavior is normal. Thought content normal.   Nursing note and vitals reviewed.      Recent Labs      11/18/17   0300   WBC  9.6   RBC  4.93   HEMOGLOBIN  13.3*   HEMATOCRIT  41.4*   MCV  84.0   MCH  27.0   MCHC  32.1*   RDW  39.7   PLATELETCT  295   MPV  9.8     Recent Labs      11/18/17   0300   SODIUM  133*   POTASSIUM  4.0   CHLORIDE  100   CO2  26   GLUCOSE  124*   BUN  15   CREATININE  0.63   CALCIUM  8.7                      Assessment/Plan     * Severe sepsis (CMS-[facility])- (present on admission)   Assessment & Plan    This is severe sepsis with organ dysfunction including musculoskeletal system  2/2 to right toe gangrene s/p amputation.  Cultures + group B Strep.  Continue IV unasyn per ID for now.  Needs Plan for 6 weeks IV abx with ertapenem, need outpatient  infusion set up.  Wound care to be set up   Sepsis resolved          Gangrene of toe of right foot (CMS-HCC)- (present on admission)   Assessment & Plan    Post Toe amputation 11-10-17   Continue IV Unasyn for cellulitis,as above.  ID following.  Strict Diabetes control  Wound care to continue        Hypokalemia- (present on admission)   Assessment & Plan    Resolved  Follow daily bmp        Hyperglycemia- (present on admission)   Assessment & Plan    Due to type II diabetes mellitus- newly dx.  HbA1c is  9.2  Continue Metformin  Continue Insulin-sliding scale, accu-checks and hypoglycemia protocol.  Diabetic Education         Class 3 obesity with serious comorbidity in adult   Assessment & Plan    Counseling provided on diet and exercise.         Hyponatremia- (present on admission)   Assessment & Plan     continue to monitor. encouarge PO intake. Improving marginally.       Candidal intertrigo- (present on admission)   Assessment & Plan    Nystatin powder affected areas        Hypomagnesemia  resolved    Patient plan of care discussed at multidisplinary team rounds and with patient and R.N at beside.         Reviewed items::  Labs reviewed, Medications reviewed and Radiology images reviewed  Zabala catheter::  No Zabala  DVT prophylaxis pharmacological::  Enoxaparin (Lovenox)  Ulcer Prophylaxis::  Not indicated  Antibiotics:  Treating active infection/contamination beyond 24 hours perioperative coverage

## 2017-11-19 NOTE — PROGRESS NOTES
Infectious Disease Progress Note    Author: Cristy Holley M.D. Date & Time of service: 2017  4:28 PM    Chief Complaint:  Gangrene    Interval History:  52 y.o. WM admitted 11/10/2017 with right great toe gangrene    AF feeling better overall-pain controlled. Denies SE abx  11/15 AF tolerating abx well-continuing to ooze from wound   AF denies SE abx-got PICC   AF had some nausea yesterday-better today   AF WBC 9.6 feels better today  Labs Reviewed, Medications Reviewed, Radiology Reviewed and Wound Reviewed.    Review of Systems:  Review of Systems   Constitutional: Negative for chills and fever.   Gastrointestinal: Negative for abdominal pain, diarrhea, nausea and vomiting.   Musculoskeletal: Positive for joint pain.   All other systems reviewed and are negative.      Hemodynamics:  Temp (24hrs), Av.4 °C (97.6 °F), Min:36.3 °C (97.3 °F), Max:36.6 °C (97.9 °F)  Temperature: 36.3 °C (97.3 °F)  Pulse  Av  Min: 70  Max: 120   Blood Pressure: 142/72       Physical Exam:  Physical Exam   Constitutional: He is oriented to person, place, and time. He appears well-developed. No distress.   Obese   HENT:   Head: Normocephalic and atraumatic.   Eyes: EOM are normal. Pupils are equal, round, and reactive to light.   Neck: Neck supple.   Cardiovascular: Normal rate and regular rhythm.    Pulmonary/Chest: Effort normal. No respiratory distress. He has no wheezes.   Abdominal: Soft. He exhibits no distension. There is no tenderness.   Musculoskeletal: He exhibits edema.   Serosanguinous drainage resolved  Right great toe amp site-incisional necrosis. Decreased edema + erythema  LUE PICC   Neurological: He is alert and oriented to person, place, and time.   Nursing note and vitals reviewed.      Meds:    Current Facility-Administered Medications:   •  PICC Line Insertion has been implemented **AND** May use Lidocaine 1% not to exceed 3 mls for local at insertion site **AND** NOTIFY MD  **AND** Tip to dwell in the superior vena cava **AND** Do not use PICC Line until placement verified by Chest X Ray **AND** DX-CHEST-FOR PICC LINE Perform procedure in: PICC Room **AND** If radiologist reading of chest X-ray states any of the following the PICC should be used **AND** Further evaluation of the PICC placement can be retrieved from X-Ray and Imaging **AND** Blood draws through PICC line; draws by RN only **AND** FLUSHING GUIDELINES WHEN IN USE **AND** normal saline PF **AND** FLUSHING GUIDELINES WHEN NOT IN USE **AND** DRESSING MAINTENANCE **AND** Change needleless pressure ports and IV tubing every 72 hours per hospital policy **AND** TUBING **AND** If there is an MD order to remove the PICC line, any RN may remove the PICC line **AND** [] PATIENT EDUCATION MATERIALS **AND** NURSING COMMUNICATION  •  potassium chloride SA  •  metformin  •  oxycodone immediate-release **OR** oxycodone immediate-release  •  senna-docusate **AND** polyethylene glycol/lytes **AND** magnesium hydroxide **AND** bisacodyl  •  enoxaparin  •  insulin regular **AND** Accu-Chek ACHS **AND** NOTIFY MD and PharmD **AND** glucose 4 g **AND** dextrose 50%  •  ondansetron  •  ondansetron  •  promethazine  •  promethazine  •  nystatin  •  ampicillin-sulbactam (UNASYN) IV  •  morphine injection    Labs:  Recent Labs      17   0300   WBC  9.6  9.6   RBC  4.91  4.93   HEMOGLOBIN  13.4*  13.3*   HEMATOCRIT  41.2*  41.4*   MCV  83.9  84.0   MCH  27.3  27.0   RDW  40.7  39.7   PLATELETCT  296  295   MPV  9.8  9.8   NEUTSPOLYS  54.90  57.20   LYMPHOCYTES  33.50  32.60   MONOCYTES  7.40  6.80   EOSINOPHILS  3.00  2.50   BASOPHILS  0.60  0.60     Recent Labs      11/16/17   0110  11/18/17   0300   SODIUM  132*  133*   POTASSIUM  4.1  4.0   CHLORIDE  100  100   CO2  26  26   GLUCOSE  126*  124*   BUN  13  15     Recent Labs      17   0110  17   0300   ALBUMIN  2.6*  2.7*   TBILIRUBIN  0.5  0.5    ALKPHOSPHAT  66  59   TOTPROTEIN  6.7  6.6   ALTSGPT  16  20   ASTSGOT  18  20   CREATININE  0.53  0.63       Imaging:  Dx-chest-portable (1 View)    Result Date: 11/10/2017  11/10/2017 3:43 PM HISTORY/REASON FOR EXAM:  Ground-level fall. Weakness. Shortness of breath. TECHNIQUE/EXAM DESCRIPTION AND NUMBER OF VIEWS: Single portable view of the chest. COMPARISON: None FINDINGS: No pneumothorax identified. There is no evidence of focal consolidation or evidence of pulmonary edema. There is no evidence of pleural effusion. The heart is normal in size.     No pneumothorax identified.    Dx-foot-complete 3+ Right    Result Date: 11/10/2017  11/10/2017 3:43 PM HISTORY/REASON FOR EXAM:  Right great toe pain. Open wound in the right great toe region. TECHNIQUE/EXAM DESCRIPTION AND NUMBER OF VIEWS: 3 views of the RIGHT foot. COMPARISON:  None. FINDINGS: There is subcutaneous air or gas in the plantar aspect of the right 1st toe region distally consistent with the open wound. No underlying bony lytic or blastic change is noted to suggest osteomyelitis. There is flexion deformity and lateral deviation of the right 2nd through 5th MTP joints. There is no evidence of displaced fracture or dislocation.     1.  Soft tissue gas consistent with open wound in the plantar aspect of the distal medial aspect of the right 1st toe. 2.  No plain film evidence of osteomyelitis.    Le Art/shayan    Result Date: 11/13/2017   Vascular Laboratory  Conclusions  No prior study is available for comparison.  Bilateral.  No evidence of arterial insufficiency.            SHAYAN                                           TBI  Findings  Bilateral:  Doppler waveforms of the common femoral artery are of high amplitude and  triphasic.  Doppler waveforms at the ankle are brisk and triphasic.  Ankle-brachial indices are normal.  Victor M Villalobos M.D.  (Electronically Signed)  Final Date:      13 November 2017                   22:51      Micro:  Results      "Procedure Component Value Units Date/Time    BLOOD CULTURE [[id number]] Collected:  11/10/17 1452    Order Status:  Completed Specimen:  Blood from Peripheral Updated:  11/15/17 1700     Significant Indicator NEG     Source BLD     Site PERIPHERAL     Blood Culture No growth after 5 days of incubation.    Narrative:       Per Hospital Policy: Only change Specimen Src: to \"Line\" if  specified by physician order.    BLOOD CULTURE [[id number]] Collected:  11/10/17 1440    Order Status:  Completed Specimen:  Blood from Peripheral Updated:  11/15/17 1700     Significant Indicator NEG     Source BLD     Site PERIPHERAL     Blood Culture No growth after 5 days of incubation.    Narrative:       Per Hospital Policy: Only change Specimen Src: to \"Line\" if  specified by physician order.    CULTURE TISSUE W/ GRM STAIN [[id number]]  (Abnormal) Collected:  11/10/17 1917    Order Status:  Completed Specimen:  Tissue Updated:  11/13/17 1733     Gram Stain Result -- (A)     Rare WBCs.  Few Gram positive cocci.       Significant Indicator POS (POS)     Source TISS     Site Periosteal Right Great Toe     Tissue Culture -- (A)     Tissue Culture -- (A)     Proteus mirabilis  Light growth  See previous culture for sensitivity report.       Tissue Culture -- (A)     Streptococcus agalactiae (Group B)  Moderate growth      ANAEROBIC CULTURE [[id number]] Collected:  11/10/17 1917    Order Status:  Completed Specimen:  Tissue Updated:  11/13/17 1733     Significant Indicator NEG     Source TISS     Site Periosteal Right Great Toe     Anaerobic Culture, Culture Res No Anaerobes isolated.    CULTURE TISSUE W/ GRM STAIN [[id number]]  (Abnormal)  (Susceptibility) Collected:  11/10/17 1917    Order Status:  Completed Specimen:  Tissue Updated:  11/13/17 1732     Significant Indicator POS (POS)     Source TISS     Site Right Great Toe Deep Tissue     Tissue Culture -- (A)     Gram Stain Result -- (A)     Few WBCs.  Few Gram positive cocci.  Rare Gram " negative rods.       Tissue Culture -- (A)     Proteus mirabilis  Moderate growth       Tissue Culture -- (A)     Streptococcus agalactiae (Group B)  Moderate growth      Culture & Susceptibility     PROTEUS MIRABILIS     Antibiotic Sensitivity Microscan Unit Status    Ampicillin Sensitive <=8 mcg/mL Final    Cefepime Sensitive <=8 mcg/mL Final    Cefotaxime Sensitive <=2 mcg/mL Final    Cefotetan Sensitive <=16 mcg/mL Final    Ceftazidime Sensitive <=1 mcg/mL Final    Ceftriaxone Sensitive <=8 mcg/mL Final    Cefuroxime Sensitive <=4 mcg/mL Final    Ciprofloxacin Sensitive <=1 mcg/mL Final    Ertapenem Sensitive <=1 mcg/mL Final    Gentamicin Sensitive <=4 mcg/mL Final    Pip/Tazobactam Sensitive <=16 mcg/mL Final    Tobramycin Sensitive <=4 mcg/mL Final    Trimeth/Sulfa Sensitive <=2/38 mcg/mL Final                       ANAEROBIC CULTURE [[id number]] Collected:  11/10/17 1917    Order Status:  Completed Specimen:  Tissue Updated:  11/13/17 1732     Significant Indicator NEG     Source TISS     Site Right Great Toe Deep Tissue     Anaerobic Culture, Culture Res No Anaerobes isolated.    URINE CULTURE(NEW) [[id number]] Collected:  11/11/17 0509    Order Status:  Completed Specimen:  Urine Updated:  11/13/17 0912     Significant Indicator NEG     Source UR     Site --     Urine Culture No growth at 48 hours    Narrative:       Indication for culture:->Emergency Room Patient          Assessment:  Active Hospital Problems    Diagnosis   • *Severe sepsis (CMS-HCC) [A41.9, R65.20]   • Gangrene of toe of right foot (CMS-HCC) [I96]   • Candidal intertrigo [B37.2]   • Hyponatremia [E87.1]   • Class 3 obesity with serious comorbidity in adult [E66.9]   • Hyperglycemia [R73.9]   • Hypokalemia [E87.6]       Plan:  Gangrene of toe of right foot (CMS-HCC)-   S/p right great toe amputation 11-10-17   Cxs are group B strep and Proteus  Path showed ulceration to margin  On Unasyn in the hosp  Plan for 6 weeks IV abx- ertapenem  Stop  date 12/22/2017-give day of discharge  Wound care to continue-FU with LPS rounds  Orders faxed to  11/15    Sepsis (CMS-HCC)- improved  Due to gangrene-s/p amp  Fever resolved  Leukocytosis resolved  Lactic acidosis resolved  Continue abx as above  Blood cxs neg 11/11      Nausea  Improved with Zofran  No associated abd pain  Monitor    Diabetes- (present on admission)  Keep BS under 150 to help control current infection  HbA1c is  9.2    Discussed with LPS

## 2017-11-20 ENCOUNTER — APPOINTMENT (OUTPATIENT)
Dept: ONCOLOGY | Facility: MEDICAL CENTER | Age: 52
End: 2017-11-20
Attending: INTERNAL MEDICINE
Payer: MEDICAID

## 2017-11-20 ENCOUNTER — APPOINTMENT (OUTPATIENT)
Dept: RADIOLOGY | Facility: MEDICAL CENTER | Age: 52
DRG: 854 | End: 2017-11-20
Attending: HOSPITALIST
Payer: MEDICAID

## 2017-11-20 PROBLEM — E87.6 HYPOKALEMIA: Status: RESOLVED | Noted: 2017-11-11 | Resolved: 2017-11-20

## 2017-11-20 PROBLEM — R65.20 SEVERE SEPSIS (HCC): Status: RESOLVED | Noted: 2017-11-10 | Resolved: 2017-11-20

## 2017-11-20 PROBLEM — A41.9 SEVERE SEPSIS (HCC): Status: RESOLVED | Noted: 2017-11-10 | Resolved: 2017-11-20

## 2017-11-20 LAB
GLUCOSE BLD-MCNC: 136 MG/DL (ref 65–99)
GLUCOSE BLD-MCNC: 137 MG/DL (ref 65–99)
GLUCOSE BLD-MCNC: 138 MG/DL (ref 65–99)
GLUCOSE BLD-MCNC: 142 MG/DL (ref 65–99)
TROPONIN I SERPL-MCNC: <0.01 NG/ML (ref 0–0.04)

## 2017-11-20 PROCEDURE — 700105 HCHG RX REV CODE 258

## 2017-11-20 PROCEDURE — A9270 NON-COVERED ITEM OR SERVICE: HCPCS | Performed by: HOSPITALIST

## 2017-11-20 PROCEDURE — 71010 DX-CHEST-LIMITED (1 VIEW): CPT

## 2017-11-20 PROCEDURE — 700111 HCHG RX REV CODE 636 W/ 250 OVERRIDE (IP): Performed by: HOSPITALIST

## 2017-11-20 PROCEDURE — 93005 ELECTROCARDIOGRAM TRACING: CPT | Performed by: HOSPITALIST

## 2017-11-20 PROCEDURE — 99232 SBSQ HOSP IP/OBS MODERATE 35: CPT | Performed by: HOSPITALIST

## 2017-11-20 PROCEDURE — 700105 HCHG RX REV CODE 258: Performed by: INTERNAL MEDICINE

## 2017-11-20 PROCEDURE — 700105 HCHG RX REV CODE 258: Performed by: HOSPITALIST

## 2017-11-20 PROCEDURE — 770006 HCHG ROOM/CARE - MED/SURG/GYN SEMI*

## 2017-11-20 PROCEDURE — A9270 NON-COVERED ITEM OR SERVICE: HCPCS | Performed by: INTERNAL MEDICINE

## 2017-11-20 PROCEDURE — 93010 ELECTROCARDIOGRAM REPORT: CPT | Performed by: INTERNAL MEDICINE

## 2017-11-20 PROCEDURE — 700102 HCHG RX REV CODE 250 W/ 637 OVERRIDE(OP): Performed by: HOSPITALIST

## 2017-11-20 PROCEDURE — 700111 HCHG RX REV CODE 636 W/ 250 OVERRIDE (IP): Performed by: INTERNAL MEDICINE

## 2017-11-20 PROCEDURE — 82962 GLUCOSE BLOOD TEST: CPT

## 2017-11-20 PROCEDURE — 700102 HCHG RX REV CODE 250 W/ 637 OVERRIDE(OP): Performed by: INTERNAL MEDICINE

## 2017-11-20 PROCEDURE — 84484 ASSAY OF TROPONIN QUANT: CPT

## 2017-11-20 RX ORDER — OXYCODONE HYDROCHLORIDE 5 MG/1
5 TABLET ORAL EVERY 4 HOURS PRN
Qty: 30 TAB | Refills: 0 | Status: SHIPPED | OUTPATIENT
Start: 2017-11-20 | End: 2017-11-23

## 2017-11-20 RX ORDER — SODIUM CHLORIDE 9 MG/ML
INJECTION, SOLUTION INTRAVENOUS
Status: COMPLETED
Start: 2017-11-20 | End: 2017-11-20

## 2017-11-20 RX ADMIN — ENOXAPARIN SODIUM 40 MG: 100 INJECTION SUBCUTANEOUS at 08:06

## 2017-11-20 RX ADMIN — NYSTATIN 1500000 UNITS: 100000 POWDER TOPICAL at 20:34

## 2017-11-20 RX ADMIN — POTASSIUM CHLORIDE 40 MEQ: 1500 TABLET, EXTENDED RELEASE ORAL at 08:06

## 2017-11-20 RX ADMIN — AMPICILLIN SODIUM AND SULBACTAM SODIUM 3 G: 2; 1 INJECTION, POWDER, FOR SOLUTION INTRAMUSCULAR; INTRAVENOUS at 05:41

## 2017-11-20 RX ADMIN — AMPICILLIN SODIUM AND SULBACTAM SODIUM 3 G: 2; 1 INJECTION, POWDER, FOR SOLUTION INTRAMUSCULAR; INTRAVENOUS at 11:43

## 2017-11-20 RX ADMIN — OXYCODONE HYDROCHLORIDE 10 MG: 10 TABLET ORAL at 00:37

## 2017-11-20 RX ADMIN — SODIUM CHLORIDE 1000 MG: 900 INJECTION INTRAVENOUS at 16:05

## 2017-11-20 RX ADMIN — OXYCODONE HYDROCHLORIDE 10 MG: 10 TABLET ORAL at 05:37

## 2017-11-20 RX ADMIN — SODIUM CHLORIDE: 9 INJECTION, SOLUTION INTRAVENOUS at 13:15

## 2017-11-20 RX ADMIN — AMPICILLIN SODIUM AND SULBACTAM SODIUM 3 G: 2; 1 INJECTION, POWDER, FOR SOLUTION INTRAMUSCULAR; INTRAVENOUS at 00:37

## 2017-11-20 RX ADMIN — NYSTATIN 1500000 UNITS: 100000 POWDER TOPICAL at 08:07

## 2017-11-20 RX ADMIN — ONDANSETRON 4 MG: 4 TABLET, ORALLY DISINTEGRATING ORAL at 08:06

## 2017-11-20 RX ADMIN — METFORMIN HYDROCHLORIDE 500 MG: 500 TABLET, FILM COATED ORAL at 08:06

## 2017-11-20 RX ADMIN — ONDANSETRON 4 MG: 4 TABLET, ORALLY DISINTEGRATING ORAL at 16:52

## 2017-11-20 RX ADMIN — OXYCODONE HYDROCHLORIDE 10 MG: 10 TABLET ORAL at 16:05

## 2017-11-20 RX ADMIN — OXYCODONE HYDROCHLORIDE 10 MG: 10 TABLET ORAL at 10:34

## 2017-11-20 RX ADMIN — OXYCODONE HYDROCHLORIDE 10 MG: 10 TABLET ORAL at 20:33

## 2017-11-20 RX ADMIN — ONDANSETRON 4 MG: 2 INJECTION INTRAMUSCULAR; INTRAVENOUS at 03:07

## 2017-11-20 RX ADMIN — METFORMIN HYDROCHLORIDE 500 MG: 500 TABLET, FILM COATED ORAL at 16:52

## 2017-11-20 ASSESSMENT — ENCOUNTER SYMPTOMS
SORE THROAT: 0
HEADACHES: 0
PND: 0
DOUBLE VISION: 0
SHORTNESS OF BREATH: 0
TREMORS: 0
FEVER: 0
DIAPHORESIS: 0
EYE PAIN: 0
CLAUDICATION: 0
DIZZINESS: 0
DEPRESSION: 0
MEMORY LOSS: 0
TINGLING: 1
EYE DISCHARGE: 0
PALPITATIONS: 0
NAUSEA: 0
EYE REDNESS: 0
NECK PAIN: 0
PHOTOPHOBIA: 0
DIARRHEA: 0
COUGH: 0
ABDOMINAL PAIN: 0
SPEECH CHANGE: 0
ORTHOPNEA: 0
BLURRED VISION: 0
SPUTUM PRODUCTION: 0
NERVOUS/ANXIOUS: 0
CHILLS: 0
BLOOD IN STOOL: 0
HEARTBURN: 0
VOMITING: 0
SENSORY CHANGE: 0
STRIDOR: 0
BACK PAIN: 0
HEMOPTYSIS: 0
CONSTIPATION: 0
BRUISES/BLEEDS EASILY: 0
MYALGIAS: 0
WEAKNESS: 0
WHEEZING: 0

## 2017-11-20 ASSESSMENT — PAIN SCALES - GENERAL
PAINLEVEL_OUTOF10: 5
PAINLEVEL_OUTOF10: 6
PAINLEVEL_OUTOF10: 3
PAINLEVEL_OUTOF10: 5

## 2017-11-20 NOTE — PROGRESS NOTES
Pt A/O x 4. On RA. Reports pain 7/10, medicated per MAR. PICC to L upper arm patent, dressing CDI. Dressing to right foot/great toe CDI. Wears offloading boot to get out of bed. Redness to abdominal and perineal folds, nystatin powder applied per MAR. Resting comfortably in bed. POC discussed. Clifford light and personal belongings within reach. Safety precautions and hourly rounding in place.

## 2017-11-20 NOTE — DISCHARGE PLANNING
"Met with pt to discuss discharge plans. Pt is now living alone in apt., room-mate has just  moved out over weekend. Pt plans to discharge to home, [facility] and will go to wound care weekly. Pt has been taught to do daily dressing changes. Pt states that he had \"chest pain\" last noc and suspects that it may be related to PICC.  Awaiting med clearance for d/c. Have canceled wound care appt scheduled for 11/22/2017, first appointment scheduled for 11/29/2017 at 0900.  "

## 2017-11-20 NOTE — PROGRESS NOTES
" Subjective:      Patient reports doing well. Patient denies chest pain, calf pain, shortness of breath, fevers, or chills.    Objective:    Alert and oriented x 3  Afebrile  Blood pressure 160/80, pulse 75, temperature 37.3 °C (99.2 °F), resp. rate 18, height 1.829 m (6' 0.01\"), weight (!) 141.5 kg (311 lb 15.2 oz), SpO2 90 %.    Recent Labs      11/18/17   0300   WBC  9.6   RBC  4.93   HEMOGLOBIN  13.3*   HEMATOCRIT  41.4*   MCV  84.0   MCH  27.0   MCHC  32.1*   RDW  39.7   PLATELETCT  295   MPV  9.8     Recent Labs      11/18/17   0300   SODIUM  133*   POTASSIUM  4.0   CHLORIDE  100   CO2  26   GLUCOSE  124*   BUN  15   CREATININE  0.63   CALCIUM  8.7         Intake/Output Summary (Last 24 hours) at 11/20/17 0703  Last data filed at 11/19/17 2350   Gross per 24 hour   Intake              720 ml   Output             1250 ml   Net             -530 ml       Comfortable, no distress  Neurologically and vascularly intact with palpable pedal pulse  Proximal/medial incision with some edge necrosis, but overall intact with no drainage or fluctuance.      Assessment:    Doing well s/p great toe amputation    Plan:    Agree with ongoing wound care and abx as detailed   "

## 2017-11-20 NOTE — CARE PLAN
Problem: Safety  Goal: Will remain free from injury  Outcome: PROGRESSING AS EXPECTED  Pt educated to call to get out of bed; call light within reach; treaded socks on;

## 2017-11-20 NOTE — PROGRESS NOTES
Hourly rounding, call bell within reach. Patient educated about plan of care, pain management, call bell use, and mobility. Demonstrated good mobility using off-loading shoe to right foot. Patient educated on how to check blood sugars, demonstrated how to use glucometer. Wound care done by patient with wound care Rn. PICC dressing changed. Pain controlled with PRN medications.  No new or worsening conditions.

## 2017-11-20 NOTE — PROGRESS NOTES
Infectious Disease Progress Note    Author: Jane Anderson M.D. Date & Time of service: 2017  1:56 PM    Chief Complaint:  Gangrene    Interval History:  52 y.o. WM admitted 11/10/2017 with right great toe gangrene    AF feeling better overall-pain controlled. Denies SE abx  11/15 AF tolerating abx well-continuing to ooze from wound   AF denies SE abx-got PICC   AF had some nausea yesterday-better today   AF WBC 9.6 feels better today   AF, no CBC, feels well, denies any foot pain, tolerating abx without issues  Labs Reviewed, Medications Reviewed, Radiology Reviewed and Wound Reviewed.    Review of Systems:  Review of Systems   Constitutional: Negative for chills and fever.   Gastrointestinal: Negative for abdominal pain, diarrhea, nausea and vomiting.   Musculoskeletal: Positive for joint pain.   All other systems reviewed and are negative.      Hemodynamics:  Temp (24hrs), Av °C (98.6 °F), Min:36.5 °C (97.7 °F), Max:37.3 °C (99.2 °F)  Temperature:  (Q8 per RN)  Pulse  Av.9  Min: 70  Max: 120   Blood Pressure: 149/91       Physical Exam:  Physical Exam   Constitutional: He is oriented to person, place, and time. He appears well-developed. No distress.   Obese   HENT:   Head: Normocephalic and atraumatic.   Eyes: EOM are normal. Pupils are equal, round, and reactive to light.   Neck: Neck supple.   Cardiovascular: Normal rate and regular rhythm.    Pulmonary/Chest: Effort normal. No respiratory distress. He has no wheezes.   Abdominal: Soft. He exhibits no distension. There is no tenderness.   Musculoskeletal: He exhibits edema.   Serosanguinous drainage resolved  Right great toe amp site-incisional necrosis. Sutures in place. No drainage. Decreased edema + erythema    LUE PICC   Neurological: He is alert and oriented to person, place, and time.   Nursing note and vitals reviewed.      Meds:    Current Facility-Administered Medications:   •  PICC Line Insertion has been  implemented **AND** May use Lidocaine 1% not to exceed 3 mls for local at insertion site **AND** NOTIFY MD **AND** Tip to dwell in the superior vena cava **AND** Do not use PICC Line until placement verified by Chest X Ray **AND** DX-CHEST-FOR PICC LINE Perform procedure in: PICC Room **AND** If radiologist reading of chest X-ray states any of the following the PICC should be used **AND** Further evaluation of the PICC placement can be retrieved from X-Ray and Imaging **AND** Blood draws through PICC line; draws by RN only **AND** FLUSHING GUIDELINES WHEN IN USE **AND** normal saline PF **AND** FLUSHING GUIDELINES WHEN NOT IN USE **AND** DRESSING MAINTENANCE **AND** Change needleless pressure ports and IV tubing every 72 hours per hospital policy **AND** TUBING **AND** If there is an MD order to remove the PICC line, any RN may remove the PICC line **AND** [] PATIENT EDUCATION MATERIALS **AND** NURSING COMMUNICATION  •  potassium chloride SA  •  metformin  •  oxycodone immediate-release **OR** oxycodone immediate-release  •  senna-docusate **AND** polyethylene glycol/lytes **AND** magnesium hydroxide **AND** bisacodyl  •  enoxaparin  •  insulin regular **AND** Accu-Chek ACHS **AND** NOTIFY MD and PharmD **AND** glucose 4 g **AND** dextrose 50%  •  ondansetron  •  ondansetron  •  promethazine  •  promethazine  •  nystatin  •  ampicillin-sulbactam (UNASYN) IV  •  morphine injection    Labs:  Recent Labs      17   0300   WBC  9.6   RBC  4.93   HEMOGLOBIN  13.3*   HEMATOCRIT  41.4*   MCV  84.0   MCH  27.0   RDW  39.7   PLATELETCT  295   MPV  9.8   NEUTSPOLYS  57.20   LYMPHOCYTES  32.60   MONOCYTES  6.80   EOSINOPHILS  2.50   BASOPHILS  0.60     Recent Labs      17   0300   SODIUM  133*   POTASSIUM  4.0   CHLORIDE  100   CO2  26   GLUCOSE  124*   BUN  15     Recent Labs      17   0300   ALBUMIN  2.7*   TBILIRUBIN  0.5   ALKPHOSPHAT  59   TOTPROTEIN  6.6   ALTSGPT  20   ASTSGOT  20   CREATININE   0.63       Imaging:  Dx-chest-portable (1 View)    Result Date: 11/10/2017  11/10/2017 3:43 PM HISTORY/REASON FOR EXAM:  Ground-level fall. Weakness. Shortness of breath. TECHNIQUE/EXAM DESCRIPTION AND NUMBER OF VIEWS: Single portable view of the chest. COMPARISON: None FINDINGS: No pneumothorax identified. There is no evidence of focal consolidation or evidence of pulmonary edema. There is no evidence of pleural effusion. The heart is normal in size.     No pneumothorax identified.    Dx-foot-complete 3+ Right    Result Date: 11/10/2017  11/10/2017 3:43 PM HISTORY/REASON FOR EXAM:  Right great toe pain. Open wound in the right great toe region. TECHNIQUE/EXAM DESCRIPTION AND NUMBER OF VIEWS: 3 views of the RIGHT foot. COMPARISON:  None. FINDINGS: There is subcutaneous air or gas in the plantar aspect of the right 1st toe region distally consistent with the open wound. No underlying bony lytic or blastic change is noted to suggest osteomyelitis. There is flexion deformity and lateral deviation of the right 2nd through 5th MTP joints. There is no evidence of displaced fracture or dislocation.     1.  Soft tissue gas consistent with open wound in the plantar aspect of the distal medial aspect of the right 1st toe. 2.  No plain film evidence of osteomyelitis.    Le Art/shayan    Result Date: 11/13/2017   Vascular Laboratory  Conclusions  No prior study is available for comparison.  Bilateral.  No evidence of arterial insufficiency.            SHAYAN                                           TBI  Findings  Bilateral:  Doppler waveforms of the common femoral artery are of high amplitude and  triphasic.  Doppler waveforms at the ankle are brisk and triphasic.  Ankle-brachial indices are normal.  Victor M Villalobos M.D.  (Electronically Signed)  Final Date:      13 November 2017                   22:51      Micro:  Results     Procedure Component Value Units Date/Time    BLOOD CULTURE [[id number]] Collected:  11/10/17 1644     "Order Status:  Completed Specimen:  Blood from Peripheral Updated:  11/15/17 1700     Significant Indicator NEG     Source BLD     Site PERIPHERAL     Blood Culture No growth after 5 days of incubation.    Narrative:       Per Hospital Policy: Only change Specimen Src: to \"Line\" if  specified by physician order.    BLOOD CULTURE [[id number]] Collected:  11/10/17 1440    Order Status:  Completed Specimen:  Blood from Peripheral Updated:  11/15/17 1700     Significant Indicator NEG     Source BLD     Site PERIPHERAL     Blood Culture No growth after 5 days of incubation.    Narrative:       Per Hospital Policy: Only change Specimen Src: to \"Line\" if  specified by physician order.    CULTURE TISSUE W/ GRM STAIN [[id number]]  (Abnormal) Collected:  11/10/17 1917    Order Status:  Completed Specimen:  Tissue Updated:  11/13/17 1733     Gram Stain Result -- (A)     Rare WBCs.  Few Gram positive cocci.       Significant Indicator POS (POS)     Source TISS     Site Periosteal Right Great Toe     Tissue Culture -- (A)     Tissue Culture -- (A)     Proteus mirabilis  Light growth  See previous culture for sensitivity report.       Tissue Culture -- (A)     Streptococcus agalactiae (Group B)  Moderate growth      ANAEROBIC CULTURE [[id number]] Collected:  11/10/17 1917    Order Status:  Completed Specimen:  Tissue Updated:  11/13/17 1733     Significant Indicator NEG     Source TISS     Site Periosteal Right Great Toe     Anaerobic Culture, Culture Res No Anaerobes isolated.    CULTURE TISSUE W/ GRM STAIN [[id number]]  (Abnormal)  (Susceptibility) Collected:  11/10/17 1917    Order Status:  Completed Specimen:  Tissue Updated:  11/13/17 1732     Significant Indicator POS (POS)     Source TISS     Site Right Great Toe Deep Tissue     Tissue Culture -- (A)     Gram Stain Result -- (A)     Few WBCs.  Few Gram positive cocci.  Rare Gram negative rods.       Tissue Culture -- (A)     Proteus mirabilis  Moderate growth       Tissue " Culture -- (A)     Streptococcus agalactiae (Group B)  Moderate growth      Culture & Susceptibility     PROTEUS MIRABILIS     Antibiotic Sensitivity Microscan Unit Status    Ampicillin Sensitive <=8 mcg/mL Final    Cefepime Sensitive <=8 mcg/mL Final    Cefotaxime Sensitive <=2 mcg/mL Final    Cefotetan Sensitive <=16 mcg/mL Final    Ceftazidime Sensitive <=1 mcg/mL Final    Ceftriaxone Sensitive <=8 mcg/mL Final    Cefuroxime Sensitive <=4 mcg/mL Final    Ciprofloxacin Sensitive <=1 mcg/mL Final    Ertapenem Sensitive <=1 mcg/mL Final    Gentamicin Sensitive <=4 mcg/mL Final    Pip/Tazobactam Sensitive <=16 mcg/mL Final    Tobramycin Sensitive <=4 mcg/mL Final    Trimeth/Sulfa Sensitive <=2/38 mcg/mL Final                       ANAEROBIC CULTURE [[id number]] Collected:  11/10/17 1917    Order Status:  Completed Specimen:  Tissue Updated:  11/13/17 1732     Significant Indicator NEG     Source TISS     Site Right Great Toe Deep Tissue     Anaerobic Culture, Culture Res No Anaerobes isolated.          Assessment:  Active Hospital Problems    Diagnosis   • *Severe sepsis (CMS-HCC) [A41.9, R65.20]   • Gangrene of toe of right foot (CMS-HCC) [I96]   • Candidal intertrigo [B37.2]   • Hyponatremia [E87.1]   • Class 3 obesity with serious comorbidity in adult [E66.9]   • Hyperglycemia [R73.9]   • Hypokalemia [E87.6]       Plan:  Gangrene of toe of right foot (CMS-HCC)-   S/p right great toe amputation 11-10-17   Cxs are group B strep and Proteus  Path showed ulceration to margin  On Unasyn in the hosp  Plan for 6 weeks IV abx- ertapenem  Stop date 12/22/2017-give ertapenem on day of discharge  Wound care to continue-FU with LPS rounds  Orders faxed to  11/15    Sepsis (CMS-HCC)- improved  Due to gangrene-s/p amp  Fever resolved  Leukocytosis resolved  Lactic acidosis resolved  Continue abx as above  Blood cxs neg 11/11      Nausea  Improved with Zofran  No associated abd pain  Monitor    Diabetes- (present on  admission)  Keep BS under 150 to help control current infection  HbA1c is  9.2    OK to DC pt from ID standpoint once abx arranged    FU ID clinic    Discussed with JUAN CARLOS and Dr. Urena

## 2017-11-21 ENCOUNTER — OUTPATIENT INFUSION SERVICES (OUTPATIENT)
Dept: ONCOLOGY | Facility: MEDICAL CENTER | Age: 52
End: 2017-11-21
Attending: INTERNAL MEDICINE
Payer: MEDICAID

## 2017-11-21 ENCOUNTER — APPOINTMENT (OUTPATIENT)
Dept: WOUND CARE | Facility: MEDICAL CENTER | Age: 52
End: 2017-11-21
Attending: HOSPITALIST
Payer: MEDICAID

## 2017-11-21 ENCOUNTER — RESOLUTE PROFESSIONAL BILLING HOSPITAL PROF FEE (OUTPATIENT)
Dept: HOSPITALIST | Facility: MEDICAL CENTER | Age: 52
End: 2017-11-21
Payer: MEDICAID

## 2017-11-21 ENCOUNTER — HOSPITAL ENCOUNTER (OUTPATIENT)
Facility: MEDICAL CENTER | Age: 52
DRG: 854 | End: 2017-11-22
Attending: EMERGENCY MEDICINE | Admitting: INTERNAL MEDICINE
Payer: MEDICAID

## 2017-11-21 VITALS
SYSTOLIC BLOOD PRESSURE: 169 MMHG | RESPIRATION RATE: 16 BRPM | OXYGEN SATURATION: 92 % | HEIGHT: 72 IN | HEART RATE: 74 BPM | DIASTOLIC BLOOD PRESSURE: 87 MMHG | TEMPERATURE: 98 F | BODY MASS INDEX: 42.25 KG/M2 | WEIGHT: 311.95 LBS

## 2017-11-21 VITALS — WEIGHT: 311.95 LBS | BODY MASS INDEX: 42.3 KG/M2

## 2017-11-21 DIAGNOSIS — R26.2 UNABLE TO AMBULATE: ICD-10-CM

## 2017-11-21 DIAGNOSIS — R53.1 WEAKNESS: ICD-10-CM

## 2017-11-21 DIAGNOSIS — R26.2 INABILITY TO AMBULATE DUE TO ANKLE OR FOOT: ICD-10-CM

## 2017-11-21 LAB
ALBUMIN SERPL BCP-MCNC: 3.4 G/DL (ref 3.2–4.9)
ALBUMIN/GLOB SERPL: 0.8 G/DL
ALP SERPL-CCNC: 63 U/L (ref 30–99)
ALT SERPL-CCNC: 23 U/L (ref 2–50)
ANION GAP SERPL CALC-SCNC: 11 MMOL/L (ref 0–11.9)
AST SERPL-CCNC: 23 U/L (ref 12–45)
BASOPHILS # BLD AUTO: 1 % (ref 0–1.8)
BASOPHILS # BLD: 0.11 K/UL (ref 0–0.12)
BILIRUB SERPL-MCNC: 0.7 MG/DL (ref 0.1–1.5)
BUN SERPL-MCNC: 15 MG/DL (ref 8–22)
CALCIUM SERPL-MCNC: 9.3 MG/DL (ref 8.5–10.5)
CHLORIDE SERPL-SCNC: 97 MMOL/L (ref 96–112)
CO2 SERPL-SCNC: 23 MMOL/L (ref 20–33)
CREAT SERPL-MCNC: 0.62 MG/DL (ref 0.5–1.4)
EKG IMPRESSION: NORMAL
EOSINOPHIL # BLD AUTO: 0.19 K/UL (ref 0–0.51)
EOSINOPHIL NFR BLD: 1.7 % (ref 0–6.9)
ERYTHROCYTE [DISTWIDTH] IN BLOOD BY AUTOMATED COUNT: 40 FL (ref 35.9–50)
GFR SERPL CREATININE-BSD FRML MDRD: >60 ML/MIN/1.73 M 2
GLOBULIN SER CALC-MCNC: 4.4 G/DL (ref 1.9–3.5)
GLUCOSE BLD-MCNC: 124 MG/DL (ref 65–99)
GLUCOSE SERPL-MCNC: 121 MG/DL (ref 65–99)
HCT VFR BLD AUTO: 47 % (ref 42–52)
HGB BLD-MCNC: 15.5 G/DL (ref 14–18)
IMM GRANULOCYTES # BLD AUTO: 0.04 K/UL (ref 0–0.11)
IMM GRANULOCYTES NFR BLD AUTO: 0.4 % (ref 0–0.9)
LYMPHOCYTES # BLD AUTO: 2.95 K/UL (ref 1–4.8)
LYMPHOCYTES NFR BLD: 26.1 % (ref 22–41)
MCH RBC QN AUTO: 27.6 PG (ref 27–33)
MCHC RBC AUTO-ENTMCNC: 33 G/DL (ref 33.7–35.3)
MCV RBC AUTO: 83.8 FL (ref 81.4–97.8)
MONOCYTES # BLD AUTO: 0.68 K/UL (ref 0–0.85)
MONOCYTES NFR BLD AUTO: 6 % (ref 0–13.4)
NEUTROPHILS # BLD AUTO: 7.32 K/UL (ref 1.82–7.42)
NEUTROPHILS NFR BLD: 64.8 % (ref 44–72)
NRBC # BLD AUTO: 0 K/UL
NRBC BLD AUTO-RTO: 0 /100 WBC
PLATELET # BLD AUTO: 362 K/UL (ref 164–446)
PMV BLD AUTO: 10.6 FL (ref 9–12.9)
POTASSIUM SERPL-SCNC: 4.3 MMOL/L (ref 3.6–5.5)
PROT SERPL-MCNC: 7.8 G/DL (ref 6–8.2)
RBC # BLD AUTO: 5.61 M/UL (ref 4.7–6.1)
SODIUM SERPL-SCNC: 131 MMOL/L (ref 135–145)
WBC # BLD AUTO: 11.3 K/UL (ref 4.8–10.8)

## 2017-11-21 PROCEDURE — 80053 COMPREHEN METABOLIC PANEL: CPT

## 2017-11-21 PROCEDURE — 700102 HCHG RX REV CODE 250 W/ 637 OVERRIDE(OP): Performed by: HOSPITALIST

## 2017-11-21 PROCEDURE — 85025 COMPLETE CBC W/AUTO DIFF WBC: CPT

## 2017-11-21 PROCEDURE — 36415 COLL VENOUS BLD VENIPUNCTURE: CPT

## 2017-11-21 PROCEDURE — G0378 HOSPITAL OBSERVATION PER HR: HCPCS

## 2017-11-21 PROCEDURE — 99239 HOSP IP/OBS DSCHRG MGMT >30: CPT | Performed by: INTERNAL MEDICINE

## 2017-11-21 PROCEDURE — 700102 HCHG RX REV CODE 250 W/ 637 OVERRIDE(OP): Performed by: INTERNAL MEDICINE

## 2017-11-21 PROCEDURE — A9270 NON-COVERED ITEM OR SERVICE: HCPCS | Performed by: HOSPITALIST

## 2017-11-21 PROCEDURE — 700111 HCHG RX REV CODE 636 W/ 250 OVERRIDE (IP): Performed by: INTERNAL MEDICINE

## 2017-11-21 PROCEDURE — 96372 THER/PROPH/DIAG INJ SC/IM: CPT

## 2017-11-21 PROCEDURE — A9270 NON-COVERED ITEM OR SERVICE: HCPCS | Performed by: INTERNAL MEDICINE

## 2017-11-21 PROCEDURE — 700105 HCHG RX REV CODE 258: Performed by: EMERGENCY MEDICINE

## 2017-11-21 PROCEDURE — 700111 HCHG RX REV CODE 636 W/ 250 OVERRIDE (IP): Performed by: HOSPITALIST

## 2017-11-21 PROCEDURE — 99218 PR INITIAL OBSERVATION CARE,LEVL I: CPT | Performed by: INTERNAL MEDICINE

## 2017-11-21 PROCEDURE — 82962 GLUCOSE BLOOD TEST: CPT

## 2017-11-21 PROCEDURE — 99285 EMERGENCY DEPT VISIT HI MDM: CPT

## 2017-11-21 RX ORDER — POLYETHYLENE GLYCOL 3350 17 G/17G
1 POWDER, FOR SOLUTION ORAL
Status: DISCONTINUED | OUTPATIENT
Start: 2017-11-21 | End: 2017-11-22 | Stop reason: HOSPADM

## 2017-11-21 RX ORDER — OXYCODONE HYDROCHLORIDE 5 MG/1
2.5 TABLET ORAL
Status: DISCONTINUED | OUTPATIENT
Start: 2017-11-21 | End: 2017-11-22 | Stop reason: HOSPADM

## 2017-11-21 RX ORDER — OXYCODONE HYDROCHLORIDE 5 MG/1
5 TABLET ORAL EVERY 4 HOURS PRN
Status: DISCONTINUED | OUTPATIENT
Start: 2017-11-21 | End: 2017-11-21

## 2017-11-21 RX ORDER — AMOXICILLIN 250 MG
2 CAPSULE ORAL 2 TIMES DAILY
Status: DISCONTINUED | OUTPATIENT
Start: 2017-11-21 | End: 2017-11-22 | Stop reason: HOSPADM

## 2017-11-21 RX ORDER — BISACODYL 10 MG
10 SUPPOSITORY, RECTAL RECTAL
Status: DISCONTINUED | OUTPATIENT
Start: 2017-11-21 | End: 2017-11-22 | Stop reason: HOSPADM

## 2017-11-21 RX ORDER — SODIUM CHLORIDE 9 MG/ML
1000 INJECTION, SOLUTION INTRAVENOUS ONCE
Status: COMPLETED | OUTPATIENT
Start: 2017-11-21 | End: 2017-11-21

## 2017-11-21 RX ORDER — ACETAMINOPHEN 325 MG/1
650 TABLET ORAL EVERY 6 HOURS PRN
Status: DISCONTINUED | OUTPATIENT
Start: 2017-11-21 | End: 2017-11-22 | Stop reason: HOSPADM

## 2017-11-21 RX ORDER — OXYCODONE HYDROCHLORIDE 5 MG/1
5 TABLET ORAL
Status: DISCONTINUED | OUTPATIENT
Start: 2017-11-21 | End: 2017-11-22 | Stop reason: HOSPADM

## 2017-11-21 RX ADMIN — ENOXAPARIN SODIUM 40 MG: 100 INJECTION SUBCUTANEOUS at 20:35

## 2017-11-21 RX ADMIN — OXYCODONE HYDROCHLORIDE 10 MG: 10 TABLET ORAL at 08:22

## 2017-11-21 RX ADMIN — OXYCODONE HYDROCHLORIDE 10 MG: 10 TABLET ORAL at 01:12

## 2017-11-21 RX ADMIN — METFORMIN HYDROCHLORIDE 500 MG: 500 TABLET, FILM COATED ORAL at 07:42

## 2017-11-21 RX ADMIN — METFORMIN HYDROCHLORIDE 500 MG: 500 TABLET, FILM COATED ORAL at 20:35

## 2017-11-21 RX ADMIN — ONDANSETRON 4 MG: 4 TABLET, ORALLY DISINTEGRATING ORAL at 04:25

## 2017-11-21 RX ADMIN — PROMETHAZINE HYDROCHLORIDE 12.5 MG: 25 TABLET ORAL at 07:42

## 2017-11-21 RX ADMIN — SODIUM CHLORIDE 1000 ML: 9 INJECTION, SOLUTION INTRAVENOUS at 15:40

## 2017-11-21 RX ADMIN — POTASSIUM CHLORIDE 40 MEQ: 1500 TABLET, EXTENDED RELEASE ORAL at 08:19

## 2017-11-21 RX ADMIN — NYSTATIN: 100000 POWDER TOPICAL at 09:00

## 2017-11-21 RX ADMIN — ENOXAPARIN SODIUM 40 MG: 100 INJECTION SUBCUTANEOUS at 08:20

## 2017-11-21 RX ADMIN — OXYCODONE HYDROCHLORIDE 5 MG: 5 TABLET ORAL at 20:36

## 2017-11-21 ASSESSMENT — ENCOUNTER SYMPTOMS
VOMITING: 0
ABDOMINAL PAIN: 0
DIARRHEA: 0
NAUSEA: 0
FEVER: 0
CHILLS: 0

## 2017-11-21 ASSESSMENT — PAIN SCALES - GENERAL
PAINLEVEL_OUTOF10: 3
PAINLEVEL_OUTOF10: 6
PAINLEVEL_OUTOF10: 5
PAINLEVEL_OUTOF10: 6

## 2017-11-21 ASSESSMENT — COPD QUESTIONNAIRES
HAVE YOU SMOKED AT LEAST 100 CIGARETTES IN YOUR ENTIRE LIFE: NO/DON'T KNOW
COPD SCREENING SCORE: 1
DO YOU EVER COUGH UP ANY MUCUS OR PHLEGM?: NO/ONLY WITH OCCASIONAL COLDS OR INFECTIONS
DURING THE PAST 4 WEEKS HOW MUCH DID YOU FEEL SHORT OF BREATH: NONE/LITTLE OF THE TIME

## 2017-11-21 NOTE — ED NOTES
C/o feeling weak, was discharged this morning s/p a R grt toe amputation, went home was waiting for friend to take to lunch and get prescriptions filled, FSBS 140, which is about normal for him. Was given metformin prior to discharge, did have breakfast prior to discharge as well

## 2017-11-21 NOTE — DISCHARGE PLANNING
Pt scheduled for [facility] tomorrow 11/22/2017 at 1130. Wound Clinic scheduled for 11/29/2017, time to be determined.

## 2017-11-21 NOTE — DISCHARGE INSTRUCTIONS
Discharge Instructions    Discharged to home by car with self. Discharged via wheelchair, hospital escort: Yes.  Special equipment needed: Not Applicable    Be sure to schedule a follow-up appointment with your primary care doctor or any specialists as instructed.     Discharge Plan:   Pneumococcal Vaccine Given - only chart on this line when given: Given (See MAR)  Influenza Vaccine Indication: Indicated: Adults > or equal to 18 years of age with severe allergy to eggs (Flublok vaccine)  Influenza Vaccine Given - only chart on this line when given: Influenza Vaccine Given (See MAR)    I understand that a diet low in cholesterol, fat, and sodium is recommended for good health. Unless I have been given specific instructions below for another diet, I accept this instruction as my diet prescription.   Other diet: Diabetic    Special Instructions: None    · Is patient discharged on Warfarin / Coumadin?   No     · Is patient Post Blood Transfusion?  No    Depression / Suicide Risk    As you are discharged from this [facility] facility, it is important to learn how to keep safe from harming yourself.    Recognize the warning signs:  · Abrupt changes in personality, positive or negative- including increase in energy   · Giving away possessions  · Change in eating patterns- significant weight changes-  positive or negative  · Change in sleeping patterns- unable to sleep or sleeping all the time   · Unwillingness or inability to communicate  · Depression  · Unusual sadness, discouragement and loneliness  · Talk of wanting to die  · Neglect of personal appearance   · Rebelliousness- reckless behavior  · Withdrawal from people/activities they love  · Confusion- inability to concentrate     If you or a loved one observes any of these behaviors or has concerns about self-harm, here's what you can do:  · Talk about it- your feelings and reasons for harming yourself  · Remove any means that you might use to hurt yourself  (examples: pills, rope, extension cords, firearm)  · Get professional help from the community (Mental Health, Substance Abuse, psychological counseling)  · Do not be alone:Call your Safe Contact- someone whom you trust who will be there for you.  · Call your local CRISIS HOTLINE 588-5608 or 093-152-4093  · Call your local Children's Mobile Crisis Response Team [place] (432) 539-4795 or www.[employer]  · Call the toll free National Suicide Prevention Hotlines   · National Suicide Prevention Lifeline 788-420-UQWU (1796)  · National Hope Line Network 800-SUICIDE (503-7803)

## 2017-11-21 NOTE — DISCHARGE PLANNING
[facility] received a transport form to arrange transportation to discharge the patient home today. Transportation has been arrange transfer the patient at 10:00 via the [facility] van. SW on floor has been notified via [employer].

## 2017-11-21 NOTE — DISCHARGE PLANNING
Will need to call pt at  with Outpt Wound Clinic time for appt. On 11/29/2017. Pt aware of [facility] first appointment tomorrow at 0956.

## 2017-11-21 NOTE — PROGRESS NOTES
[facility] Hospitalist Progress Note    Date of Service: 11/20/2017    Chief Complaint  52 y.o. Male  admitted 11/10/2017 with right great toe swelling, pain . Dx Severe sepsis w Gangrene Rt great Toe- post  Right Great Toe Amputation 11-10-17     Interval Problem Update  Patient doing well clinically, no acute complaints. Patient denies fevers/chills, chest pain, shortness of breath or nausea/vommiting.   Continue wound care  continue IV Unasyn, cultures group B strep, ID following.    11/15  Patient doing well overall, no gross changes clinically.  Continue IV Unasyn.  ID following,   Continue wound care.    11/16  Patient doing well, had nausea this morning, says doesn't know if he is able to keep PO down. Continue IV zofran prn.  Continue antibiotics.  Outpatient infusion being set up.    11/17  Patient still complaining of nausea and had episode of vomiting, although feels better than yesterday, is worried that he may come back to the hospital if nausea persists.   Continue prn zofran, compazine.   Outpatient infusion set up for confirmation.  Outpatient wound care.    11/18  No acute issues, patient reports his nausea has resolved. He also said that his room mate has moved out so he would be alone, and has no one to help him. He is afraid that he cannot take care of himself. Reached out to social work, given patient is pending for medicaid, and no home health will accept patient without payor source. Think that patient is a high risk. Otherwise Patient denies fevers/chills, chest pain, shortness of breath or nausea/vommiting.   Continue IV unasyn for now per ID.    11/19  Patient comfortable Patient denies fevers/chills, chest pain, shortness of breath or nausea/vommiting.   Pending  on Home, health, outpatient antibiotic infusion setup. Pending for Medicaid    11/20  No acute issues, patient doing well Patient denies fevers/chills, chest pain, shortness of breath or nausea/vommiting.   Had some left  arm pain yesterday night after fluids were pushed through PICC LIne, he said was burning pain that radiated to his left side of chest and lasted over 30min. EKG and trops x1 are neg. Believe this was due to infusion through the IV.  Receiving 1st dose of ertapenem today  WIll make sure outpatient infusion is set up.    Consultants/Specialty  Orthopedic surgery Dr. Leo  ID .    Disposition  TBD        Review of Systems   Constitutional: Negative for chills, diaphoresis, fever and malaise/fatigue.   HENT: Negative for congestion, hearing loss, sore throat and tinnitus.    Eyes: Negative for blurred vision, double vision, photophobia, pain, discharge and redness.   Respiratory: Negative for cough, hemoptysis, sputum production, shortness of breath, wheezing and stridor.    Cardiovascular: Negative for chest pain, palpitations, orthopnea, claudication, leg swelling (unchanged) and PND.   Gastrointestinal: Negative for abdominal pain, blood in stool, constipation, heartburn, melena, nausea and vomiting.   Genitourinary: Negative for dysuria, frequency and urgency.   Musculoskeletal: Positive for joint pain. Negative for back pain, myalgias and neck pain.        Improved.   Skin: Negative for itching and rash.   Neurological: Positive for tingling (chronic in feet.). Negative for dizziness, tremors, sensory change, speech change, weakness and headaches.   Endo/Heme/Allergies: Does not bruise/bleed easily.   Psychiatric/Behavioral: Negative for depression, memory loss and suicidal ideas. The patient is not nervous/anxious.       Physical Exam  Laboratory/Imaging   Hemodynamics  Temp (24hrs), Av.1 °C (98.7 °F), Min:36.7 °C (98.1 °F), Max:37.3 °C (99.2 °F)   Temperature: 37.2 °C (99 °F)  Pulse  Av.6  Min: 70  Max: 120    Blood Pressure: 142/83      Respiratory      Respiration: 16, Pulse Oximetry: 92 %             Fluids    Intake/Output Summary (Last 24 hours) at 17 4106  Last data filed at  11/20/17 2200   Gross per 24 hour   Intake                0 ml   Output              375 ml   Net             -375 ml       Nutrition  Orders Placed This Encounter   Procedures   • Diet Order     Standing Status:   Standing     Number of Occurrences:   1     Order Specific Question:   Diet:     Answer:   Diabetic [3]     Physical Exam   Constitutional: He is oriented to person, place, and time. He appears well-developed and well-nourished. No distress.   HENT:   Head: Normocephalic and atraumatic.   Mouth/Throat: No oropharyngeal exudate.   Eyes: Conjunctivae and EOM are normal. Pupils are equal, round, and reactive to light. Right eye exhibits no discharge. Left eye exhibits no discharge. No scleral icterus.   Neck: Neck supple. No JVD present. No thyromegaly present.   Cardiovascular: Normal rate, regular rhythm and intact distal pulses.    No murmur heard.  Pulmonary/Chest: Effort normal and breath sounds normal. No stridor. No respiratory distress. He has no wheezes. He has no rales.   Abdominal: Soft. Bowel sounds are normal. He exhibits no distension. There is no tenderness. There is no rebound.   Obese.    Musculoskeletal: Normal range of motion. He exhibits edema (trace b/l L/e edema  same) and tenderness (right toe area on palpation, improving).   Right great Toe amputation no erythema, sutures in place dressing intact, dry and clean, edema down significantly     Neurological: He is alert and oriented to person, place, and time. No cranial nerve deficit.   Skin: Skin is warm and dry. No rash noted. He is not diaphoretic. No erythema.   Psychiatric: He has a normal mood and affect. His behavior is normal. Thought content normal.   Nursing note and vitals reviewed.      Recent Labs      11/18/17   0300   WBC  9.6   RBC  4.93   HEMOGLOBIN  13.3*   HEMATOCRIT  41.4*   MCV  84.0   MCH  27.0   MCHC  32.1*   RDW  39.7   PLATELETCT  295   MPV  9.8     Recent Labs      11/18/17   0300   SODIUM  133*   POTASSIUM  4.0    CHLORIDE  100   CO2  26   GLUCOSE  124*   BUN  15   CREATININE  0.63   CALCIUM  8.7                      Assessment/Plan     Candidal intertrigo  Nystatin powder affected areas     Hyponatremia  Stable, monitor bmp    Hypokalemia  Resolved  Follow daily bmp    Hyperglycemia  Due to type II diabetes mellitus- newly dx.  HbA1c is  9.2  Continue Metformin  Continue Insulin-sliding scale, accu-checks and hypoglycemia protocol.  Diabetic Education    Gangrene of toe of right foot (CMS-HCC)  Post Toe amputation 11-10-17   IV ertapanem first dose today, to get rest at infusion clinic  Diabetes control  Wound care to continue    Severe sepsis (CMS-HCC)    This is severe sepsis with organ dysfunction including musculoskeletal system  2/2 to right toe gangrene s/p amputation.  Cultures + group B Strep.  Needs Plan for 6 weeks IV abx with ertapenem, need outpatient infusion set up.  Wound care to be set up   Sepsis resolved  First dose of ertapenem ordered today    Class 3 obesity with serious comorbidity in adult  Counseling provided on diet and exercise.     Hypomagnesemia  resolved      Patient plan of care discussed at multidisplinary team rounds and with patient and R.N at [facility].                                                                                                 Reviewed items::  Labs reviewed, Medications reviewed and Radiology images reviewed  Zabala catheter::  No Zabala  DVT prophylaxis pharmacological::  Enoxaparin (Lovenox)  Ulcer Prophylaxis::  Not indicated  Antibiotics:  Treating active infection/contamination beyond 24 hours perioperative coverage

## 2017-11-21 NOTE — PROGRESS NOTES
Hourly rounding, call bell within reach. Patient educated about plan of care, pain management, call bell use, and mobility. Patient verbalized understanding of diabetes, PICC, and wound care at home. Set up with outpatient infusion and wound care. Patient demonstrated how to change dressing to right foot and how to apply off loading shoe. Discharge instructions reviewed with patient, questions answered, verbalized understanding. Patient set up with [facility] transport to go home.

## 2017-11-21 NOTE — PROGRESS NOTES
Infectious Disease Progress Note    Author: Jane Anderson M.D. Date & Time of service: 2017  11:05 AM    Chief Complaint:  Gangrene    Interval History:  52 y.o. WM admitted 11/10/2017 with right great toe gangrene    AF feeling better overall-pain controlled. Denies SE abx  11/15 AF tolerating abx well-continuing to ooze from wound   AF denies SE abx-got PICC   AF had some nausea yesterday-better today   AF WBC 9.6 feels better today   AF, no CBC, feels well, denies any foot pain, tolerating abx without issues   AF, feels great and happy he's going home, tolerated ertapenem without side effects  Labs Reviewed, Medications Reviewed, Radiology Reviewed and Wound Reviewed.    Review of Systems:  Review of Systems   Constitutional: Negative for chills and fever.   Gastrointestinal: Negative for abdominal pain, diarrhea, nausea and vomiting.   Musculoskeletal: Positive for joint pain.   All other systems reviewed and are negative.      Hemodynamics:  Temp (24hrs), Av.8 °C (98.2 °F), Min:36.4 °C (97.5 °F), Max:37.2 °C (99 °F)  Temperature: 36.7 °C (98 °F)  Pulse  Av.3  Min: 70  Max: 120   Blood Pressure: (!) 169/87 (RN notified)       Physical Exam:  Physical Exam   Constitutional: He is oriented to person, place, and time. He appears well-developed. No distress.   Obese   HENT:   Head: Normocephalic and atraumatic.   Eyes: EOM are normal. Pupils are equal, round, and reactive to light.   Neck: Neck supple.   Cardiovascular: Normal rate and regular rhythm.    Pulmonary/Chest: Effort normal. No respiratory distress. He has no wheezes.   Abdominal: Soft. He exhibits no distension. There is no tenderness.   Musculoskeletal: He exhibits edema.   Serosanguinous drainage resolved  Right great toe amp site-incisional necrosis. Sutures in place. No drainage. Decreased edema + erythema    LUE PICC   Neurological: He is alert and oriented to person, place, and time.   Nursing note  and vitals reviewed.      Meds:    Current Facility-Administered Medications:   •  ampicillin-sulbactam (UNASYN) IV  •  PICC Line Insertion has been implemented **AND** May use Lidocaine 1% not to exceed 3 mls for local at insertion site **AND** NOTIFY MD **AND** Tip to dwell in the superior vena cava **AND** Do not use PICC Line until placement verified by Chest X Ray **AND** DX-CHEST-FOR PICC LINE Perform procedure in: PICC Room **AND** If radiologist reading of chest X-ray states any of the following the PICC should be used **AND** Further evaluation of the PICC placement can be retrieved from X-Ray and Imaging **AND** Blood draws through PICC line; draws by RN only **AND** FLUSHING GUIDELINES WHEN IN USE **AND** normal saline PF **AND** FLUSHING GUIDELINES WHEN NOT IN USE **AND** DRESSING MAINTENANCE **AND** Change needleless pressure ports and IV tubing every 72 hours per hospital policy **AND** TUBING **AND** If there is an MD order to remove the PICC line, any RN may remove the PICC line **AND** [] PATIENT EDUCATION MATERIALS **AND** NURSING COMMUNICATION  •  potassium chloride SA  •  metformin  •  oxycodone immediate-release **OR** oxycodone immediate-release  •  senna-docusate **AND** polyethylene glycol/lytes **AND** magnesium hydroxide **AND** bisacodyl  •  enoxaparin  •  insulin regular **AND** Accu-Chek ACHS **AND** NOTIFY MD and PharmD **AND** glucose 4 g **AND** dextrose 50%  •  ondansetron  •  ondansetron  •  promethazine  •  promethazine  •  nystatin  •  morphine injection    Labs:  No results for input(s): WBC, RBC, HEMOGLOBIN, HEMATOCRIT, MCV, MCH, RDW, PLATELETCT, MPV, NEUTSPOLYS, LYMPHOCYTES, MONOCYTES, EOSINOPHILS, BASOPHILS, RBCMORPHOLO in the last 72 hours.  No results for input(s): SODIUM, POTASSIUM, CHLORIDE, CO2, GLUCOSE, BUN, CPKTOTAL in the last 72 hours.  No results for input(s): ALBUMIN, TBILIRUBIN, ALKPHOSPHAT, TOTPROTEIN, ALTSGPT, ASTSGOT, CREATININE in the last 72  hours.    Imaging:  Dx-chest-portable (1 View)    Result Date: 11/10/2017  11/10/2017 3:43 PM HISTORY/REASON FOR EXAM:  Ground-level fall. Weakness. Shortness of breath. TECHNIQUE/EXAM DESCRIPTION AND NUMBER OF VIEWS: Single portable view of the chest. COMPARISON: None FINDINGS: No pneumothorax identified. There is no evidence of focal consolidation or evidence of pulmonary edema. There is no evidence of pleural effusion. The heart is normal in size.     No pneumothorax identified.    Dx-foot-complete 3+ Right    Result Date: 11/10/2017  11/10/2017 3:43 PM HISTORY/REASON FOR EXAM:  Right great toe pain. Open wound in the right great toe region. TECHNIQUE/EXAM DESCRIPTION AND NUMBER OF VIEWS: 3 views of the RIGHT foot. COMPARISON:  None. FINDINGS: There is subcutaneous air or gas in the plantar aspect of the right 1st toe region distally consistent with the open wound. No underlying bony lytic or blastic change is noted to suggest osteomyelitis. There is flexion deformity and lateral deviation of the right 2nd through 5th MTP joints. There is no evidence of displaced fracture or dislocation.     1.  Soft tissue gas consistent with open wound in the plantar aspect of the distal medial aspect of the right 1st toe. 2.  No plain film evidence of osteomyelitis.    Le Art/shayan    Result Date: 11/13/2017   Vascular Laboratory  Conclusions  No prior study is available for comparison.  Bilateral.  No evidence of arterial insufficiency.            SHAYAN                                           TBI  Findings  Bilateral:  Doppler waveforms of the common femoral artery are of high amplitude and  triphasic.  Doppler waveforms at the ankle are brisk and triphasic.  Ankle-brachial indices are normal.  Victor M Villalobos M.D.  (Electronically Signed)  Final Date:      13 November 2017                   22:51      Micro:  Results     Procedure Component Value Units Date/Time    BLOOD CULTURE [[id number]] Collected:  11/10/17 1452    Order  "Status:  Completed Specimen:  Blood from Peripheral Updated:  11/15/17 1700     Significant Indicator NEG     Source BLD     Site PERIPHERAL     Blood Culture No growth after 5 days of incubation.    Narrative:       Per Hospital Policy: Only change Specimen Src: to \"Line\" if  specified by physician order.    BLOOD CULTURE [[id number]] Collected:  11/10/17 1440    Order Status:  Completed Specimen:  Blood from Peripheral Updated:  11/15/17 1700     Significant Indicator NEG     Source BLD     Site PERIPHERAL     Blood Culture No growth after 5 days of incubation.    Narrative:       Per Hospital Policy: Only change Specimen Src: to \"Line\" if  specified by physician order.          Assessment:  Active Hospital Problems    Diagnosis   • *Severe sepsis (CMS-[facility]) [A41.9, R65.20]   • Gangrene of toe of right foot (CMS-HCC) [I96]   • Candidal intertrigo [B37.2]   • Hyponatremia [E87.1]   • Class 3 obesity with serious comorbidity in adult [E66.9]   • Hyperglycemia [R73.9]   • Hypokalemia [E87.6]       Plan:  Gangrene of toe of right foot (CMS-HCC)  S/p right great toe amputation 11-10-17   Cxs are group B strep and Proteus  Path showed ulceration to margin  On Unasyn in the hosp  Plan for 6 weeks IV abx- ertapenem  Stop date 12/22/2017-give ertapenem on day of discharge - pt tolerated  Wound care to continue-FU with LPS rounds  Orders faxed to  11/15    Sepsis (CMS-HCC)- improved  Due to gangrene-s/p amp  Fever resolved  Leukocytosis resolved  Lactic acidosis resolved  Continue abx as above  Blood cxs neg 11/11      Nausea  Improved with Zofran  No associated abd pain  Monitor    Diabetes- (present on admission)  Keep BS under 150 to help control current infection  HbA1c is  9.2    OK to DC pt from ID standpoint once abx arranged    FU ID clinic    Discussed with IM. ID signing off  "

## 2017-11-21 NOTE — PROGRESS NOTES
" Subjective:      Pain controlled.  No SOB or CP.    Objective:    Alert and oriented x 3  Afebrile  Blood pressure (!) 161/77, pulse 70, temperature 36.4 °C (97.5 °F), resp. rate 16, height 1.829 m (6' 0.01\"), weight (!) 141.5 kg (311 lb 15.2 oz), SpO2 90 %.                Intake/Output Summary (Last 24 hours) at 11/21/17 0648  Last data filed at 11/21/17 0600   Gross per 24 hour   Intake                0 ml   Output              725 ml   Net             -725 ml       Comfortable, no distress  Neurologically and vascularly intact with palpable pedal pulses bilaterally.  Wound unchanged    Assessment:     Doing well s/p great toe amputation     Plan:    Agree with ongoing wound care and abx as detailed   WBAT through the heel  "

## 2017-11-22 ENCOUNTER — HOSPITAL ENCOUNTER (EMERGENCY)
Facility: MEDICAL CENTER | Age: 52
End: 2017-11-23
Attending: EMERGENCY MEDICINE
Payer: MEDICAID

## 2017-11-22 ENCOUNTER — OUTPATIENT INFUSION SERVICES (OUTPATIENT)
Dept: ONCOLOGY | Facility: MEDICAL CENTER | Age: 52
End: 2017-11-22
Attending: INTERNAL MEDICINE
Payer: MEDICAID

## 2017-11-22 ENCOUNTER — PATIENT OUTREACH (OUTPATIENT)
Dept: HEALTH INFORMATION MANAGEMENT | Facility: OTHER | Age: 52
End: 2017-11-22

## 2017-11-22 VITALS
OXYGEN SATURATION: 96 % | HEART RATE: 76 BPM | DIASTOLIC BLOOD PRESSURE: 83 MMHG | TEMPERATURE: 98 F | WEIGHT: 308.64 LBS | BODY MASS INDEX: 41.8 KG/M2 | RESPIRATION RATE: 15 BRPM | SYSTOLIC BLOOD PRESSURE: 146 MMHG | HEIGHT: 72 IN

## 2017-11-22 DIAGNOSIS — R11.0 NAUSEA: ICD-10-CM
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
## 2017-11-22 PROBLEM — R26.2 INABILITY TO AMBULATE DUE TO ANKLE OR FOOT: Status: RESOLVED | Noted: 2017-11-21 | Resolved: 2017-11-22

## 2017-11-22 PROBLEM — I10 HYPERTENSION: Status: ACTIVE | Noted: 2017-11-22

## 2017-11-22 PROBLEM — E11.9 TYPE 2 DIABETES MELLITUS (HCC): Status: ACTIVE | Noted: 2017-11-22

## 2017-11-22 LAB
ALBUMIN SERPL BCP-MCNC: 3.8 G/DL (ref 3.2–4.9)
ALBUMIN/GLOB SERPL: 0.8 G/DL
ALP SERPL-CCNC: 69 U/L (ref 30–99)
ALT SERPL-CCNC: 24 U/L (ref 2–50)
ANION GAP SERPL CALC-SCNC: 12 MMOL/L (ref 0–11.9)
ANION GAP SERPL CALC-SCNC: 6 MMOL/L (ref 0–11.9)
AST SERPL-CCNC: 21 U/L (ref 12–45)
BILIRUB SERPL-MCNC: 0.7 MG/DL (ref 0.1–1.5)
BUN SERPL-MCNC: 12 MG/DL (ref 8–22)
BUN SERPL-MCNC: 15 MG/DL (ref 8–22)
CALCIUM SERPL-MCNC: 9.3 MG/DL (ref 8.5–10.5)
CALCIUM SERPL-MCNC: 9.9 MG/DL (ref 8.5–10.5)
CHLORIDE SERPL-SCNC: 100 MMOL/L (ref 96–112)
CHLORIDE SERPL-SCNC: 98 MMOL/L (ref 96–112)
CO2 SERPL-SCNC: 23 MMOL/L (ref 20–33)
CO2 SERPL-SCNC: 26 MMOL/L (ref 20–33)
CREAT SERPL-MCNC: 0.49 MG/DL (ref 0.5–1.4)
CREAT SERPL-MCNC: 0.98 MG/DL (ref 0.5–1.4)
ERYTHROCYTE [DISTWIDTH] IN BLOOD BY AUTOMATED COUNT: 39.8 FL (ref 35.9–50)
GFR SERPL CREATININE-BSD FRML MDRD: >60 ML/MIN/1.73 M 2
GFR SERPL CREATININE-BSD FRML MDRD: >60 ML/MIN/1.73 M 2
GLOBULIN SER CALC-MCNC: 4.7 G/DL (ref 1.9–3.5)
GLUCOSE BLD-MCNC: 132 MG/DL (ref 65–99)
GLUCOSE SERPL-MCNC: 125 MG/DL (ref 65–99)
GLUCOSE SERPL-MCNC: 151 MG/DL (ref 65–99)
HCT VFR BLD AUTO: 45.5 % (ref 42–52)
HGB BLD-MCNC: 14.9 G/DL (ref 14–18)
MCH RBC QN AUTO: 27.3 PG (ref 27–33)
MCHC RBC AUTO-ENTMCNC: 32.7 G/DL (ref 33.7–35.3)
MCV RBC AUTO: 83.5 FL (ref 81.4–97.8)
PLATELET # BLD AUTO: 320 K/UL (ref 164–446)
PMV BLD AUTO: 10.9 FL (ref 9–12.9)
POTASSIUM SERPL-SCNC: 4 MMOL/L (ref 3.6–5.5)
POTASSIUM SERPL-SCNC: 4 MMOL/L (ref 3.6–5.5)
PROT SERPL-MCNC: 8.5 G/DL (ref 6–8.2)
RBC # BLD AUTO: 5.45 M/UL (ref 4.7–6.1)
SODIUM SERPL-SCNC: 132 MMOL/L (ref 135–145)
SODIUM SERPL-SCNC: 133 MMOL/L (ref 135–145)
WBC # BLD AUTO: 9.1 K/UL (ref 4.8–10.8)

## 2017-11-22 PROCEDURE — G0378 HOSPITAL OBSERVATION PER HR: HCPCS

## 2017-11-22 PROCEDURE — 700102 HCHG RX REV CODE 250 W/ 637 OVERRIDE(OP): Performed by: INTERNAL MEDICINE

## 2017-11-22 PROCEDURE — 97161 PT EVAL LOW COMPLEX 20 MIN: CPT

## 2017-11-22 PROCEDURE — 700105 HCHG RX REV CODE 258: Performed by: INTERNAL MEDICINE

## 2017-11-22 PROCEDURE — G8988 SELF CARE GOAL STATUS: HCPCS | Mod: CI

## 2017-11-22 PROCEDURE — 700111 HCHG RX REV CODE 636 W/ 250 OVERRIDE (IP): Performed by: INTERNAL MEDICINE

## 2017-11-22 PROCEDURE — G8987 SELF CARE CURRENT STATUS: HCPCS | Mod: CI

## 2017-11-22 PROCEDURE — 306589 SLEEVE,VASO CALF LARGE: Performed by: INTERNAL MEDICINE

## 2017-11-22 PROCEDURE — 99284 EMERGENCY DEPT VISIT MOD MDM: CPT

## 2017-11-22 PROCEDURE — 85027 COMPLETE CBC AUTOMATED: CPT

## 2017-11-22 PROCEDURE — G8979 MOBILITY GOAL STATUS: HCPCS | Mod: CI

## 2017-11-22 PROCEDURE — 96365 THER/PROPH/DIAG IV INF INIT: CPT

## 2017-11-22 PROCEDURE — A9270 NON-COVERED ITEM OR SERVICE: HCPCS | Performed by: INTERNAL MEDICINE

## 2017-11-22 PROCEDURE — 96375 TX/PRO/DX INJ NEW DRUG ADDON: CPT

## 2017-11-22 PROCEDURE — 700111 HCHG RX REV CODE 636 W/ 250 OVERRIDE (IP): Performed by: FAMILY MEDICINE

## 2017-11-22 PROCEDURE — G8989 SELF CARE D/C STATUS: HCPCS | Mod: CI

## 2017-11-22 PROCEDURE — 80053 COMPREHEN METABOLIC PANEL: CPT

## 2017-11-22 PROCEDURE — 36415 COLL VENOUS BLD VENIPUNCTURE: CPT

## 2017-11-22 PROCEDURE — 80048 BASIC METABOLIC PNL TOTAL CA: CPT

## 2017-11-22 PROCEDURE — 97165 OT EVAL LOW COMPLEX 30 MIN: CPT

## 2017-11-22 PROCEDURE — G8978 MOBILITY CURRENT STATUS: HCPCS | Mod: CI

## 2017-11-22 PROCEDURE — 82962 GLUCOSE BLOOD TEST: CPT

## 2017-11-22 PROCEDURE — 99217 PR OBSERVATION CARE DISCHARGE: CPT | Performed by: INTERNAL MEDICINE

## 2017-11-22 PROCEDURE — 85025 COMPLETE CBC W/AUTO DIFF WBC: CPT

## 2017-11-22 RX ORDER — LISINOPRIL 5 MG/1
5 TABLET ORAL DAILY
Qty: 30 TAB | Refills: 1 | Status: ON HOLD | OUTPATIENT
Start: 2017-11-23 | End: 2017-12-01

## 2017-11-22 RX ORDER — AMLODIPINE BESYLATE 5 MG/1
5 TABLET ORAL DAILY
Qty: 30 TAB | Refills: 1 | Status: ON HOLD | OUTPATIENT
Start: 2017-11-23 | End: 2017-12-01

## 2017-11-22 RX ORDER — DEXTROSE MONOHYDRATE 25 G/50ML
25 INJECTION, SOLUTION INTRAVENOUS
Status: DISCONTINUED | OUTPATIENT
Start: 2017-11-22 | End: 2017-11-22 | Stop reason: HOSPADM

## 2017-11-22 RX ORDER — HYDRALAZINE HYDROCHLORIDE 20 MG/ML
10 INJECTION INTRAMUSCULAR; INTRAVENOUS ONCE
Status: COMPLETED | OUTPATIENT
Start: 2017-11-22 | End: 2017-11-22

## 2017-11-22 RX ORDER — AMLODIPINE BESYLATE 5 MG/1
5 TABLET ORAL
Status: DISCONTINUED | OUTPATIENT
Start: 2017-11-22 | End: 2017-11-22 | Stop reason: HOSPADM

## 2017-11-22 RX ORDER — LISINOPRIL 10 MG/1
5 TABLET ORAL
Status: DISCONTINUED | OUTPATIENT
Start: 2017-11-22 | End: 2017-11-22 | Stop reason: HOSPADM

## 2017-11-22 RX ADMIN — SODIUM CHLORIDE 1000 MG: 900 INJECTION INTRAVENOUS at 10:38

## 2017-11-22 RX ADMIN — AMLODIPINE BESYLATE 5 MG: 5 TABLET ORAL at 09:59

## 2017-11-22 RX ADMIN — LISINOPRIL 5 MG: 10 TABLET ORAL at 09:59

## 2017-11-22 RX ADMIN — OXYCODONE HYDROCHLORIDE 5 MG: 5 TABLET ORAL at 05:05

## 2017-11-22 RX ADMIN — METFORMIN HYDROCHLORIDE 500 MG: 500 TABLET, FILM COATED ORAL at 09:59

## 2017-11-22 RX ADMIN — HYDRALAZINE HYDROCHLORIDE 10 MG: 20 INJECTION INTRAMUSCULAR; INTRAVENOUS at 06:39

## 2017-11-22 ASSESSMENT — COGNITIVE AND FUNCTIONAL STATUS - GENERAL
DAILY ACTIVITIY SCORE: 24
CLIMB 3 TO 5 STEPS WITH RAILING: A LITTLE
WALKING IN HOSPITAL ROOM: A LITTLE
MOVING FROM LYING ON BACK TO SITTING ON SIDE OF FLAT BED: A LITTLE
SUGGESTED CMS G CODE MODIFIER DAILY ACTIVITY: CH
MOBILITY SCORE: 21
SUGGESTED CMS G CODE MODIFIER MOBILITY: CJ

## 2017-11-22 ASSESSMENT — ENCOUNTER SYMPTOMS
CONSTIPATION: 0
DIARRHEA: 0
CHILLS: 0
ABDOMINAL PAIN: 0
BLOOD IN STOOL: 0
NERVOUS/ANXIOUS: 0
COUGH: 0
EYE PAIN: 0
DIZZINESS: 0
HEMOPTYSIS: 0
NAUSEA: 0
WHEEZING: 0
FOCAL WEAKNESS: 0
PALPITATIONS: 0
SHORTNESS OF BREATH: 0
FALLS: 0
SEIZURES: 0
WEAKNESS: 0
MYALGIAS: 1
TREMORS: 0
HEADACHES: 0
FEVER: 0
LOSS OF CONSCIOUSNESS: 0
VOMITING: 0
EYE REDNESS: 0
INSOMNIA: 0

## 2017-11-22 ASSESSMENT — PAIN SCALES - GENERAL
PAINLEVEL_OUTOF10: 6
PAINLEVEL_OUTOF10: 0

## 2017-11-22 ASSESSMENT — LIFESTYLE VARIABLES
ALCOHOL_USE: NO
EVER_SMOKED: NEVER
DO YOU DRINK ALCOHOL: NO

## 2017-11-22 ASSESSMENT — GAIT ASSESSMENTS
ASSISTIVE DEVICE: SINGLE POINT CANE;FRONT WHEEL WALKER
DISTANCE (FEET): 175
DEVIATION: ANTALGIC
GAIT LEVEL OF ASSIST: STAND BY ASSIST

## 2017-11-22 ASSESSMENT — ACTIVITIES OF DAILY LIVING (ADL): TOILETING: INDEPENDENT

## 2017-11-22 NOTE — ED NOTES
The Medication Reconciliation process has been completed by interviewing the patient who left [facility] this am.    Allergies have been reviewed    Home Pharmacy:  [facility] - [address]

## 2017-11-22 NOTE — ASSESSMENT & PLAN NOTE
Just discharged to home following right gangrenous toe status post right great toe amputation on November 10, 2017 by Dr. Leo.  Unable to ambulate at home and therefore returned here.  PT, OT for likely advanced level of care.

## 2017-11-22 NOTE — ED PROVIDER NOTES
ED Provider Note    Scribed for Benito Clark M.D. by Simone Pierson. 11/21/2017  4:18 PM    Primary care provider: Pcp Pt States None  Means of arrival: EMS  History obtained from: Patient  History limited by: None    CHIEF COMPLAINT  Chief Complaint   Patient presents with   • Weakness       HPI  Sebastián Castro is a 52 y.o. male who presents to the Emergency Department With a report of weakness. He is apparently discussed just discharged from the emergency department this morning at about 10 AM. He was in hospital since 10 November and had a right great toe amputation performed secondary to diabetic infection and necrosis. Patient states that he only took a few steps this morning and an ambulation trial. He was sent home and resided in a recliner until he realized in trying to get up from the recliner that he was too weak to walk. He did not have a fall. Denies any new fever or pain complaint but says it is just simple too weak at this point to walk. He does have a PICC line is given antibiotics at home    REVIEW OF SYSTEMS  Pertinent negatives include no fever. As above, all other systems reviewed and are negative.   See HPI for further details.     PAST MEDICAL HISTORY   has a past medical history of Hypertension.    SURGICAL HISTORY   has a past surgical history that includes toe amputation (Right, 11/10/2017).    SOCIAL HISTORY  Social History   Substance Use Topics   • Smoking status: Never Smoker   • Alcohol use No      History   Drug use: Unknown       FAMILY HISTORY  None noted    CURRENT MEDICATIONS  No current facility-administered medications on file prior to encounter.      Current Outpatient Prescriptions on File Prior to Encounter   Medication Sig Dispense Refill   • metformin (GLUCOPHAGE) 500 MG Tab Take 1 Tab by mouth 2 times a day, with meals. 60 Tab 0   • oxycodone immediate-release (ROXICODONE) 5 MG Tab Take 1 Tab by mouth every four hours as needed for up to 4 days. 30 Tab 0       ALLERGIES  Allergies   Allergen Reactions   • Pcn [Penicillins] Hives and Rash      Rash & hives  Tolerating Ampicillin + Sulbactam 11/12/17       PHYSICAL EXAM  VITAL SIGNS: /75   Pulse 96   Temp 36.2 °C (97.2 °F)   Resp 16   Ht 1.829 m (6')   Wt (!) 143.1 kg (315 lb 7.7 oz)   SpO2 97%   BMI 42.79 kg/m²   Constitutional: Well developed, Well nourished, No acute distress, Non-toxic appearance.   HENT: Normocephalic, Atraumatic, Bilateral external ears normal, Oropharynx is clear mucous membranes areDry. No oral exudates or nasal discharge.   Eyes: Pupils are equal round and reactive, EOMI, Conjunctiva normal, No discharge.   Neck: Normal range of motion, No tenderness, Supple, No stridor. No meningismus.  Lymphatic: No lymphadenopathy noted.   Cardiovascular: Regular rate and rhythm without murmur rub or gallop.  Thorax & Lungs: Clear breath sounds bilaterally without wheezes, rhonchi or rales. There is no chest wall tenderness.   Abdomen: Soft non-tender non-distended. There is no rebound or guarding. No organomegaly is appreciated. Bowel sounds are normal.  Skin: Normal without rash.   Back: No CVA or spinal tenderness.   Extremities: Intact distal pulses, No edema, right-sided distal foot tenderness, bandages dry at the right great toe, No cyanosis, No clubbing. Capillary refill is less than 2 seconds.  Musculoskeletal: Good range of motion in all major joints. No tenderness to palpation or major deformities noted.   Neurologic: Alert & oriented x 3, Normal motor function, Normal sensory function, No focal deficits noted. Reflexes are normal.  Psychiatric: Affect normal, Judgment normal, Mood normal. There is no suicidal ideation or patient reported hallucinations.       DIAGNOSTIC STUDIES / PROCEDURES    LABS  Labs Reviewed   CBC WITH DIFFERENTIAL - Abnormal; Notable for the following:        Result Value    WBC 11.3 (*)     MCHC 33.0 (*)     All other components within normal limits   COMP  METABOLIC PANEL      All labs reviewed by me.      COURSE & MEDICAL DECISION MAKING  Nursing notes, VS, PMSFHx reviewed in chart.    4:18 PM Patient seen and examined at bedside. Ordered for labs to evaluate. The patient will be resuscitated with IV fluids due to Being dehydrated.    Laboratory was performed that shows he does have a mildly elevated white blood cell count 11,300 with no shift. Mild hyponatremia but no other significant electrolyte derangements.    Given that he is too weak to walk admitting him back to the hospital and he will likely need a rehab hospital stay.  Spoke with [facility] hospitalist about this admission approximately 5:15 PM    DISPOSITION:  Patient will be admitted to medical floor in stable condition    FINAL IMPRESSION  1. Weakness    2. Unable to ambulate          ISimone (Scribe), am scribing for, and in the presence of, Benito Clark M.D..    Electronically signed by: Simone Pierson (Scribe), 11/21/2017    IBenito M.D. personally performed the services described in this documentation, as scribed by Simone Pierson in my presence, and it is both accurate and complete.    The note accurately reflects work and decisions made by me.  Benito Clark  11/21/2017  5:45 PM

## 2017-11-22 NOTE — PROGRESS NOTES
Contacted [facility] infusion clinic pt is scheduled 11/24/17 at 830 AM, pt has an appt daily thereafter, the infusion  stated that the times are all set, she will be able to print out a schedule when pt comes in 11/24/17.  Wound clinic also contacted.  Pt has a scheduled appt on 11/29/17 at 945am. After that appt evaluation will be scheduled for further appt's accordingly.  Met with pt to go over these times and dates.  Pt verbalized understanding.  Pt then stated that he had no way to get home.  RN CM asked him about the previous notes of the CM from last admission about utilizing the bus to get from home to the infusion clinic via bus, pt confirmed this. Rn CM then gave pt a bus pass to get home today from the hospital.

## 2017-11-22 NOTE — DISCHARGE SUMMARY
CHIEF COMPLAINT ON ADMISSION  Chief Complaint   Patient presents with   • T-5000 GLF     Tripped on shoe lace while at job interview   • Shortness of Breath     Since last night   • Weakness     Generalized weakness   • Open Wound     Right great toe large open wound. States it opened up yesterday.        CODE STATUS  Full Code    HPI & HOSPITAL COURSE  52 y.o. Male  admitted 11/10/2017 with right great toe swelling and pain, hence on admission sepsis protocol was initiated, IV antibiotics were started. Orthopaedics was consulted . Patient was taken to the OR and for his right gangrenous toe a  Right Great Toe Amputation was performed on  11-10-17. After this Infectious diease was following patient and antibiotics were adjusted, with cultures showing group B strep Unasyn was continued. Overall during his hospitalization, with assistance of PT, wound care, patient has been clinically stable and no longer needs hospital level of care. outpatient infusion has been set up on his behalf, PICC line has been placed. IV ertapenem first dose has been given. In addition yesterday night patient complained of left arm pain which started after IV was hooked up to his pick line, which he said radiated to his chest, lasted for short period of time, he also said was positional as well. Patient was worried about it being a dislodged picc line. Proper placement was confirmed with a repeat xray. In addition EKG and troponin's were drawn which were negative.He was able to ambulate without shortness of breath or exacerbating symptoms. Patient will be discharged with close outpatient follow up including wound care, IV antibiotic infusion.     Therefore, he is discharged in good and stable condition with close outpatient follow-up.    SPECIFIC OUTPATIENT FOLLOW-UP  As above    DISCHARGE PROBLEM LIST  Principal Problem (Resolved):    Severe sepsis (CMS-[facility]) POA: Yes  Active Problems:    Gangrene of toe of right foot (CMS-[facility]) POA:  Yes    Candidal intertrigo POA: Yes    Hyponatremia POA: Yes    Class 3 obesity with serious comorbidity in adult POA: Unknown    Hyperglycemia POA: Yes    Hypomagnesemia POA: Yes  Resolved Problems:    Hypokalemia POA: Yes      FOLLOW UP  Future Appointments  Date Time Provider Department Center   [facility]2017 8:30 [facility]   11/24/2017 7:30 [facility]   11/25/2017 10:00 AM [facility]   11/26/2017 9:00 AM [facility]   11/27/2017 7:30 AM [facility]   11/28/2017 7:30 AM [facility]   11/29/2017 7:30 [facility]   11/29/2017 10:00 AM LAMAR Lopez [address] [place] [address]   11/30/2017 7:30 [facility]   12/1/2017 8:00 AM [facility]   12/2/2017 10:30 AM [facility]   12/3/2017 9:00 AM [facility]   12/4/2017 9:00 AM [facility]   12[facility]5/2017 7:30 AM [facility]   12/6/2017 8:00 AM [facility]   12/6/2017 9:30 AM LAMAR Lopez [address] [place] [address]   12/7/2017 7:30 AM [facility]   12/8/2017 7:30 AM [facility]   12/9/2017 9:30 AM [facility]   12/10/2017 8:30 AM [facility]   12[facility]11/2017 7:30 AM [facility]   12/12/2017 7:30 AM RN 6 [facility]   12/13/2017 7:30 AM RN 6 [facility]   12/13/2017 8:30 AM LAMAR oLpez [address] [place] [address]   12/14/2017 7:30 AM [facility]   12/15/2017 7:30 AM [facility]   [facility]16/2017 8:00 [facility] [address] [facility]   12/17/2017 8:00 AM RN 2 [facility]   12/18/2017 7:30 AM RN 6 [facility]   12/19/2017 7:30 AM RN 6 [facility]   12/20/2017 7:30 AM RN 6 [facility]   12/20/2017 8:30 AM Ekta Mullen R.N. [address]   12/21/2017 7:30 AM RN 6 [facility]   12/22/2017 7:30 AM RN 6 [facility]     No follow-up provider specified.    MEDICATIONS ON DISCHARGE   Sebastián Castro   Home Medication Instructions HIRAM:[id number]    Printed  on:11/21/17 1819   Medication Information                      metformin (GLUCOPHAGE) 500 MG Tab  Take 1 Tab by mouth 2 times a day, with meals.             oxycodone immediate-release (ROXICODONE) 5 MG Tab  Take 1 Tab by mouth every four hours as needed for up to 4 days.                 DIET  No orders of the defined types were placed in this encounter.      ACTIVITY  As tolerated.  Weight bearing as tolerated      CONSULTATIONS  ID   Ortho     PROCEDURES   Right Great toe amputation, through MTP joint   DX-CHEST-LIMITED (1 VIEW)   Final Result      Left PICC line is present and appears appropriately located      IR-PICC LINE PLACEMENT > AGE 5   Final Result                  Ultrasound-guided PICC placement performed by qualified nursing staff as    above.          DX-CHEST-FOR PICC LINE Perform procedure in: PICC Room   Final Result      Interval placement of a left arm PICC line which projects in satisfactory position.      LE ART/SHAYAN   Final Result      DX-FOOT-COMPLETE 3+ RIGHT   Final Result      1.  Soft tissue gas consistent with open wound in the plantar aspect of the distal medial aspect of the right 1st toe.      2.  No plain film evidence of osteomyelitis.      DX-CHEST-PORTABLE (1 VIEW)   Final Result      No pneumothorax identified.      ARTERIAL EVALUATION LOWER EXTREMITY    (Results Pending)         LABORATORY  Lab Results   Component Value Date/Time    SODIUM 131 (L) 11/21/2017 03:35 PM    POTASSIUM 4.3 11/21/2017 03:35 PM    CHLORIDE 97 11/21/2017 03:35 PM    CO2 23 11/21/2017 03:35 PM    GLUCOSE 121 (H) 11/21/2017 03:35 PM    BUN 15 11/21/2017 03:35 PM    CREATININE 0.62 11/21/2017 03:35 PM        Lab Results   Component Value Date/Time    WBC 11.3 (H) 11/21/2017 03:35 PM    HEMOGLOBIN 15.5 11/21/2017 03:35 PM    HEMATOCRIT 47.0 11/21/2017 03:35 PM    PLATELETCT 362 11/21/2017 03:35 PM        Total time of the discharge process exceeds 32 minutes

## 2017-11-22 NOTE — DISCHARGE INSTRUCTIONS
Discharge Instructions    Discharged to home by medical transportation with self. Discharged via wheelchair, hospital escort: Refused.  Special equipment needed: Walker    Be sure to schedule a follow-up appointment with your primary care doctor or any specialists as instructed.     Discharge Plan:   Diet Plan: Discussed  Activity Level: Discussed  Confirmed Follow up Appointment: Patient to Call and Schedule Appointment  Confirmed Symptoms Management: Discussed  Medication Reconciliation Updated: Yes  Influenza Vaccine Indication: Not indicated: Previously immunized this influenza season and > 8 years of age    I understand that a diet low in cholesterol, fat, and sodium is recommended for good health. Unless I have been given specific instructions below for another diet, I accept this instruction as my diet prescription.   Other diet: heart healthy    Special Instructions: None    · Is patient discharged on Warfarin / Coumadin?   No     · Is patient Post Blood Transfusion?  No            Depression / Suicide Risk    As you are discharged from this [facility] facility, it is important to learn how to keep safe from harming yourself.    Recognize the warning signs:  · Abrupt changes in personality, positive or negative- including increase in energy   · Giving away possessions  · Change in eating patterns- significant weight changes-  positive or negative  · Change in sleeping patterns- unable to sleep or sleeping all the time   · Unwillingness or inability to communicate  · Depression  · Unusual sadness, discouragement and loneliness  · Talk of wanting to die  · Neglect of personal appearance   · Rebelliousness- reckless behavior  · Withdrawal from people/activities they love  · Confusion- inability to concentrate     If you or a loved one observes any of these behaviors or has concerns about self-harm, here's what you can do:  · Talk about it- your feelings and reasons for harming yourself  · Remove any means  that you might use to hurt yourself (examples: pills, rope, extension cords, firearm)  · Get professional help from the community (Mental Health, Substance Abuse, psychological counseling)  · Do not be alone:Call your Safe Contact- someone whom you trust who will be there for you.  · Call your local CRISIS HOTLINE 850-1095 or 346-623-5091  · Call your local Children's Mobile Crisis Response Team [place] (215) 550-4374 or www.Ryan  · Call the toll free National Suicide Prevention Hotlines   · National Suicide Prevention Lifeline 725-491-VHBL (9110)  · National Hope Line Network 800-SUICIDE (524-5622)    Infusion clinic appointment 11/23/17 at 760z  Once there the infusion clinic will get a printout to you for future appointments    Wound clinic  Appointment 11/29/17 at 311k

## 2017-11-22 NOTE — THERAPY
"Occupational Therapy Evaluation completed.   Functional Status:  Pt currently is Mod I with supine to sit EOB.  Pt able to don off loading shoe with set up.  Pt amb to bathroom without AD & supervision.  Pt was Mod I with toilet transfer.  Pt able to get out of recliner chair with Mod I.  Pt encouraged to be active to keep his strength up.  Pt stated his roommate will be able to assist with grocery shopping if needed.  Plan of Care: Patient with no further skilled OT needs in the acute care setting at this time  Discharge Recommendations:  Equipment: Front-Wheel Walker. Post-acute therapy Discharge to home with outpatient or home health for additional skilled therapy services.    Pt was recently D/C home after hospital stay & returned due to weakness.  Pt currently able to perform basic ADL's & functional mobility with Mod I.  Pt may benefit from  to ensure safe transition to home environment.    See \"Rehab Therapy-Acute\" Patient Summary Report for complete documentation.    "

## 2017-11-22 NOTE — DISCHARGE SUMMARY
HOSPITAL MEDICINE DISCHARGE SUMMARY     Name: Sebastián Castro  MRN: [id number]  : 1965  Admit Date: 2017  Discharge Date: 2017  Attending Provider: Zaid Butterfield M.D.  Admitting Provider: Parveen Ford M.D.  Primary Care Physician: Pcp Pt States None     DISCHARGE DIAGNOSES:   Principal Problem (Resolved):    Inability to ambulate due to ankle or foot POA: Yes  Active Problems:    Gangrene of toe of right foot, s/p recent amputation (CMS-H[facility]) POA: Yes    Hypertension POA: Yes    Euvolemic Hyponatremia POA: Yes    Class 3 obesity with serious comorbidity in adult POA: Yes    Type 2 diabetes mellitus (CMS-HCC) POA: Yes        SUMMARY OF EVENTS LEADING TO ADMISSION:   This is a 52-year-old male with   hypertension, type 2 diabetes mellitus, who was recently discharged after   having his right great toe amputated for a gangrenous toe and was noted to be   able to ambulate well with CAM boot on discharge, but is now again admitted on   2017 with inability to navigate and ambulate at home.    For further details of history of present illness, past medical, social,   family histories, allergies, and medications, please refer to admission H and   P by Dr. Parveen Ford.    HOSPITAL COURSE:  The patient was admitted to the hospitalist service after   being initially evaluated in the emergency department, where his initial blood   workup was unimpressive with only mildly elevated WBC of 11.3, and sodium of   131, which is stable from his recent sodium levels.  Urinalysis was   unimpressive.  He did have elevated blood pressure, and thus he was started on   Norvasc and lisinopril with better blood pressure control.  Notably, he was   not evaluated by PT and OT on recent discharge, and thus this was ordered.  He   was evaluated by PT and OT and he did well with them, but they recommended   outpatient skilled therapy services, along with front-wheeled walker, which   were arranged  for him.  He was given the dose of Ertapenem while he is   in-house.  PT and OT gave the opinion that he is at his functional level, and he   can be safely discharged to home and thus he was cleared to go home.    He was deemed ready to be discharged from the hospital as he did not have any   further inpatient needs.  Patient felt comfortable going home.    The discharge plan was discussed with the patient with which he was agreeable   to.    The patient was subsequently sent home in improved and stable condition.    DISCHARGE DISPOSITION: recovered as expected.      FOLLOW-UP ISSUES:   1. Recent gangrenous toe amputation (R) - he was evaluated by PT/OT and he did well, but will require front wheeled walker. Continue IV infusion of ertapenem as outpatient, and wound care follow-up.      2. HTN - continue norvasc and lisinopril. Follow-up with PCP in 1-2 weeks for continued BP monitoring and management.       CHANGES IN MANAGEMENT OF CHRONIC CONDITION: As above.      PENDING TESTS: none     FOLLOW-UP TESTS ORDERED POST DISCHARGE: none     DISCHARGE MEDICATIONS:           Prior to Admission medications    Medication Sig Start Date End Date Taking? Authorizing Provider   amlodipine (NORVASC) 5 MG Tab Take 1 Tab by mouth every day. 11/23/17   Yes Zaid Butterfield M.D.   lisinopril (PRINIVIL) 5 MG Tab Take 1 Tab by mouth every day. 11/23/17   Yes Zaid Butterfield M.D.   metformin (GLUCOPHAGE) 500 MG Tab Take 1 Tab by mouth 2 times a day, with meals. 11/21/17     Kailyn Urena M.D.   oxycodone immediate-release (ROXICODONE) 5 MG Tab Take 1 Tab by mouth every four hours as needed for up to 4 days. 11/20/17 11/24/17   Kailyn Urena M.D.            FOLLOW-UP APPOINTMENTS:   Shmuel Hickman, AVanePVaneNVane  [address]  [phone]  Go on 12/5/2017  Please check in at 1pm for a 130pm appointment. Bring photo ID, proof of income, proof of residence. [facility] has a sliding scale fee. thank you          1. PCP - 1-2 weeks.       For further details on discharge medications, patient education, diet, and activity, please refer to electronic copy of discharge instructions.         TIME SPENT: 40 minutes, with greater than 50% of the time spent on face-to-face encounter, addressing medical issues, coordination of care, counseling, discharge planning, medication reconciliation, and documentation.       ____________________________________     Zaid Butterfield M.D.    SADIA / NOEMY    DD:  11/22/2017 10:57:57  DT:  11/22/2017 11:09:40    D#:  [id number]  Job#:  [id number]

## 2017-11-22 NOTE — THERAPY
"Physical Therapy Evaluation completed.   Bed Mobility:  Supine to Sit: Supervised  Transfers: Sit to Stand: Stand by Assist  Gait: Level Of Assist: Stand by Assist with Front-Wheel Walker       Plan of Care: Will benefit from Physical Therapy 1-2 more sessions prior to DC and Plan to complete next treatment by Friday 11/24  Discharge Recommendations: Equipment: Front-Wheel Walker. Post-acute therapy Discharge to home with outpatient or home health for additional skilled therapy services.    Pt is a 52 year old male admitted to the hospital for pain and weakness following DC from hospital for R great toe amputation. Pt reports that he was just DC'd yesterday but then began experiencing increased weakness at home. Pt is HWB following great toe amputation with offloading shoe. At time of initial evaluation, pt strength and coordination WNLs'. Pt does present with decreased balance and gait deviations ambulating with SPC. With FWW, improved balance with decreased instability. Pt overall performing all mobility at SBA to SPV level. Pt would benefit from 1-2 more PT sessions prior to DC, however, is at a functional level in which he can DC home. Pt would benefit from FWW upon DC and home health PT      See \"Rehab Therapy-Acute\" Patient Summary Report for complete documentation.     "

## 2017-11-22 NOTE — H&P
Hospital Medicine History and Physical    Date of Service  11/22/2017    Chief Complaint  Chief Complaint   Patient presents with   • Weakness       History of Presenting Illness  52 y.o. male who presented 11/21/2017 withInability to walk. He was just recently discharged after having his right great toe amputated for right gangrenous toe. Please see Dr. Navas's notes and discharge summary. As soon as he went home he is unable to navigate and ambulate. He does tell me that he has steps at home. He therefore returned to the emergency room.  At the emergency room he is afebrile and hemodynamically stable. Emergency physician requested admission for placement.   When I saw him in the emergency room and was not in distress. Mild tenderness and swelling around the right great toe stump, bandages clean dry and intact. He does however have pain that is unable to bear weight on that foot.   Primary Care Physician  Pcp Pt States None    Consultants      Code Status  Full    Review of Systems  Review of Systems   Constitutional: Negative for chills and fever.   HENT: Negative for congestion, hearing loss and nosebleeds.    Eyes: Negative for pain and redness.   Respiratory: Negative for cough, hemoptysis, shortness of breath and wheezing.    Cardiovascular: Negative for chest pain and palpitations.   Gastrointestinal: Negative for abdominal pain, blood in stool, constipation, diarrhea, nausea and vomiting.   Genitourinary: Negative for dysuria, frequency and hematuria.   Musculoskeletal: Positive for joint pain and myalgias. Negative for falls.   Skin: Negative for rash.   Neurological: Negative for dizziness, tremors, focal weakness, seizures, loss of consciousness, weakness and headaches.   Psychiatric/Behavioral: The patient is not nervous/anxious and does not have insomnia.    All other systems reviewed and are negative.       Past Medical History  Past Medical History:   Diagnosis Date   • Hypertension        Surgical  History  Past Surgical History:   Procedure Laterality Date   • TOE AMPUTATION Right 11/10/2017    Procedure: TOE AMPUTATION;  Surgeon: Osorio Leo M.D.;  Location: [facility];  Service: Orthopedics       Medications  No current facility-administered medications on file prior to encounter.      Current Outpatient Prescriptions on File Prior to Encounter   Medication Sig Dispense Refill   • metformin (GLUCOPHAGE) 500 MG Tab Take 1 Tab by mouth 2 times a day, with meals. 60 Tab 0   • oxycodone immediate-release (ROXICODONE) 5 MG Tab Take 1 Tab by mouth every four hours as needed for up to 4 days. 30 Tab 0       Family History  No family history on file.    Social History  Social History   Substance Use Topics   • Smoking status: Never Smoker   • Smokeless tobacco: Not on file   • Alcohol use No       Allergies  Allergies   Allergen Reactions   • Pcn [Penicillins] Hives and Rash      Rash & hives  Tolerating Ampicillin + Sulbactam 17        Physical Exam  Laboratory   Hemodynamics  Temp (24hrs), Av.3 °C (97.4 °F), Min:36.2 °C (97.2 °F), Max:36.5 °C (97.7 °F)   Temperature: 36.3 °C (97.4 °F)  Pulse  Av.8  Min: 72  Max: 96    Blood Pressure: 145/78      Respiratory      Respiration: 18, Pulse Oximetry: 96 %             Physical Exam   Constitutional: He appears well-developed and well-nourished.   HENT:   Head: Normocephalic and atraumatic.   Eyes: Conjunctivae and EOM are normal. No scleral icterus.   Neck: Normal range of motion. Neck supple.   Cardiovascular: Normal rate and regular rhythm.  Exam reveals no gallop and no friction rub.    No murmur heard.  Pulmonary/Chest: Effort normal and breath sounds normal. No respiratory distress. He has no wheezes. He has no rales.   Abdominal: Soft. Bowel sounds are normal. He exhibits no distension. There is no tenderness. There is no rebound and no guarding.   Musculoskeletal: He exhibits edema (right great toe stump. Bandages clean dry and  intact.) and tenderness (right great toe stump).   Neurological: He is alert.   Skin: Skin is warm.   Psychiatric: He has a normal mood and affect. His behavior is normal.       Recent Labs      11/21/17   1535   WBC  11.3*   RBC  5.61   HEMOGLOBIN  15.5   HEMATOCRIT  47.0   MCV  83.8   MCH  27.6   MCHC  33.0*   RDW  40.0   PLATELETCT  362   MPV  10.6     Recent Labs      11/21/17   1535   SODIUM  131*   POTASSIUM  4.3   CHLORIDE  97   CO2  23   GLUCOSE  121*   BUN  15   CREATININE  0.62   CALCIUM  9.3     Recent Labs      11/21/17   1535   ALTSGPT  23   ASTSGOT  23   ALKPHOSPHAT  63   TBILIRUBIN  0.7   GLUCOSE  121*                 Lab Results   Component Value Date    TROPONINI <0.01 11/20/2017     Urinalysis:    Lab Results  Component Value Date/Time   SPECGRAVITY 1.024 11/11/2017 0509   GLUCOSEUR Negative 11/11/2017 0509   KETONES Negative 11/11/2017 0509   NITRITE Negative 11/11/2017 0509   WBCURINE 5-10 (A) 11/11/2017 0509   RBCURINE 2-5 (A) 11/11/2017 0509   BACTERIA Negative 11/11/2017 0509   EPITHELCELL Moderate (A) 11/11/2017 0509        Imaging  Dx-chest-for Picc Line Perform Procedure In: Picc Room    Result Date: 11/15/2017  11/15/2017 12:16 PM HISTORY/REASON FOR EXAM:  PICC line placement. IV access necessity. TECHNIQUE/EXAM DESCRIPTION AND NUMBER OF VIEWS: Single AP view of the chest. COMPARISON: 11/10/2017 FINDINGS: There is interval placement of a left arm PICC line with its tip projecting over the SVC in what appears to be satisfactory position. Increased interstitial opacity is present diffusely which represent underlying fibrotic change or minor interstitial edema. No lobar consolidation is present. The heart is at the upper limits of normal in size. There is no pleural effusion. Bony structures and soft tissues are unremarkable.     Interval placement of a left arm PICC line which projects in satisfactory position.    Dx-chest-limited (1 View)    Result Date: 11/20/2017 11/20/2017 2:57 PM  HISTORY/REASON FOR EXAM:  Chest Pain. Left PICC line placement TECHNIQUE/EXAM DESCRIPTION AND NUMBER OF VIEWS: Single AP view of the chest. COMPARISON:  1 view chest 11/15/2017 FINDINGS: Left PICC line is present. The tip projects in the SVC.. The lungs are clear. Cardiac Silhouette: enlarged. Pleura: There are no pleural effusion or pneumothoraces. Osseous structures: No significant bony abnormality is present.     Left PICC line is present and appears appropriately located    Dx-chest-portable (1 View)    Result Date: 11/10/2017  11/10/2017 3:43 PM HISTORY/REASON FOR EXAM:  Ground-level fall. Weakness. Shortness of breath. TECHNIQUE/EXAM DESCRIPTION AND NUMBER OF VIEWS: Single portable view of the chest. COMPARISON: None FINDINGS: No pneumothorax identified. There is no evidence of focal consolidation or evidence of pulmonary edema. There is no evidence of pleural effusion. The heart is normal in size.     No pneumothorax identified.    Dx-foot-complete 3+ Right    Result Date: 11/10/2017  11/10/2017 3:43 PM HISTORY/REASON FOR EXAM:  Right great toe pain. Open wound in the right great toe region. TECHNIQUE/EXAM DESCRIPTION AND NUMBER OF VIEWS: 3 views of the RIGHT foot. COMPARISON:  None. FINDINGS: There is subcutaneous air or gas in the plantar aspect of the right 1st toe region distally consistent with the open wound. No underlying bony lytic or blastic change is noted to suggest osteomyelitis. There is flexion deformity and lateral deviation of the right 2nd through 5th MTP joints. There is no evidence of displaced fracture or dislocation.     1.  Soft tissue gas consistent with open wound in the plantar aspect of the distal medial aspect of the right 1st toe. 2.  No plain film evidence of osteomyelitis.    Ir-picc Line Placement > Age 5    Result Date: 11/15/2017  HISTORY/REASON FOR EXAM:   PICC placement. TECHNIQUE/EXAM DESCRIPTION AND NUMBER OF VIEWS:   PICC line insertion with ultrasound guidance.  The  procedure was performed using maximal sterile barrier technique including sterile gown, mask, cap, and donning of sterile gloves following appropriate hand hygiene and/or sterile scrub. Patient skin site was prepped with 2% Chlorhexidine solution. FINDINGS:  PICC line insertion with Ultrasound Guidance was performed by qualified nursing staff without the assistance of a Radiologist.  A follow up chest x-ray will be performed to determine appropriateness of PICC positioning.              Ultrasound-guided PICC placement performed by qualified nursing staff as above.     [facility]/radha    Result Date: 2017   Vascular Laboratory  Conclusions  No prior study is available for comparison.  Bilateral.  No evidence of arterial insufficiency.  EMELY FLORES  Age:    52    Gender:     M  MRN:    [id number]  :    1965      BSA:  Exam Date:     2017 10:46  Room #:     Inpatient  Priority:     Routine  Ht (in):             Wt (lb):  Ordering Physician:        MORIS REEVES  Referring Physician:       MORIS REEVES  Sonographer:               Ji Almeida  Study Type:                Complete Bilateral  Technical Quality:         Adequate  Indications:     Diabetes  CPT Codes:       20316  ICD Codes:       250  History:         Status/post amputation of gangrenous right great toe.  Limitations:                 RIGHT  Waveform            Systolic BPs (mmHg)                             145           Brachial  Triphasic                                Common Femoral  Triphasic                  160           Posterior Tibial  Triphasic                  165           Dorsalis Pedis                                           Peroneal                             1.09          RADHA                                           TBI                       LEFT  Waveform        Systolic BPs (mmHg)                             152           Brachial  Triphasic                                 Common Femoral  Triphasic                  175           Posterior Tibial  Triphasic                  148           Dorsalis Pedis                                           Peroneal                             1.15          SHAYAN                                           TBI  Findings  Bilateral:  Doppler waveforms of the common femoral artery are of high amplitude and  triphasic.  Doppler waveforms at the ankle are brisk and triphasic.  Ankle-brachial indices are normal.  Victor M Villalobos M.D.  (Electronically Signed)  Final Date:      13 November 2017                   22:51     Assessment/Plan     I anticipate this patient will require at least two midnights for appropriate medical management, necessitating inpatient admission.   Inpatient recommended per Epic    * Inability to ambulate due to ankle or foot   Assessment & Plan    Just discharged to home following right gangrenous toe status post right great toe amputation on November 10, 2017 by Dr. Leo.  Unable to ambulate at home and therefore returned here.  PT, OT for likely advanced level of care.        Type 2 diabetes mellitus (CMS-[facility])   Assessment & Plan    On metformin. Continue metformin. Add sliding scale.            VTE prophylaxis:Lovenox    I spent 71minutes, reviewing the chart, notes, vitals, labs, imaging, ordering labs, evaluating Sebastián Melissacorry for assessment, enacting the plan above. 50% of the time was spent in counseling Sebastián Castro and answering questions. Discussed with ED physician.  Time was devoted to counseling and coordinating care including review of records, pertinent lab data and studies, as well as discussing diagnostic evaluation and work up, planned therapeutic interventions and future disposition of care. Where indicated, the assessment and plan reflect discussion of patient with consultants, other healthcare providers, family members, and additional research needed to obtain further information in  formulating the plan of care for Sebastián Castro.   Sections of this note have been dictated using Dragon Speech recognition software. While care and attention has been placed to ensure the accuracy of this note, there can be occasional typographical errors that may be missed and I apologize if this situation occurs. Please take these errors into context. If questions occur please do not hesitate to call or notify the author of this note for clarification.

## 2017-11-22 NOTE — DISCHARGE PLANNING
Called pt and informed him of Wound Clinic F/U appt scheduled for 11/29/2017 at 10am. Pt states understanding.

## 2017-11-22 NOTE — PROGRESS NOTES
Discharge instructions provided to patient. Pt verbalizes understanding. Pt states all questions have been answered. Copy of DC provided to patient. Signed copy in chart. 2 prescriptions sent to pharmacy. Pt states all personal belongings are in possession.  Pt escorted off unit by this RN to bus stop without incident, bus pass provided..

## 2017-11-23 ENCOUNTER — APPOINTMENT (OUTPATIENT)
Dept: RADIOLOGY | Facility: MEDICAL CENTER | Age: 52
DRG: 563 | End: 2017-11-23
Attending: EMERGENCY MEDICINE
Payer: MEDICAID

## 2017-11-23 ENCOUNTER — APPOINTMENT (OUTPATIENT)
Dept: RADIOLOGY | Facility: MEDICAL CENTER | Age: 52
DRG: 563 | End: 2017-11-23
Attending: INTERNAL MEDICINE
Payer: MEDICAID

## 2017-11-23 ENCOUNTER — HOSPITAL ENCOUNTER (INPATIENT)
Facility: MEDICAL CENTER | Age: 52
LOS: 3 days | DRG: 563 | End: 2017-12-01
Attending: EMERGENCY MEDICINE | Admitting: INTERNAL MEDICINE
Payer: MEDICAID

## 2017-11-23 ENCOUNTER — OUTPATIENT INFUSION SERVICES (OUTPATIENT)
Dept: ONCOLOGY | Facility: MEDICAL CENTER | Age: 52
End: 2017-11-23
Attending: INTERNAL MEDICINE
Payer: MEDICAID

## 2017-11-23 ENCOUNTER — RESOLUTE PROFESSIONAL BILLING HOSPITAL PROF FEE (OUTPATIENT)
Dept: HOSPITALIST | Facility: MEDICAL CENTER | Age: 52
End: 2017-11-23
Payer: MEDICAID

## 2017-11-23 VITALS
TEMPERATURE: 97.2 F | RESPIRATION RATE: 16 BRPM | WEIGHT: 306.44 LBS | OXYGEN SATURATION: 94 % | DIASTOLIC BLOOD PRESSURE: 70 MMHG | SYSTOLIC BLOOD PRESSURE: 111 MMHG | HEIGHT: 72 IN | BODY MASS INDEX: 41.51 KG/M2 | HEART RATE: 74 BPM

## 2017-11-23 VITALS
WEIGHT: 306.44 LBS | SYSTOLIC BLOOD PRESSURE: 143 MMHG | BODY MASS INDEX: 41.56 KG/M2 | HEART RATE: 137 BPM | DIASTOLIC BLOOD PRESSURE: 74 MMHG | RESPIRATION RATE: 18 BRPM | OXYGEN SATURATION: 98 % | TEMPERATURE: 97.4 F

## 2017-11-23 DIAGNOSIS — M25.562 ACUTE PAIN OF LEFT KNEE: ICD-10-CM

## 2017-11-23 DIAGNOSIS — E86.0 DEHYDRATION: ICD-10-CM

## 2017-11-23 DIAGNOSIS — I96 GANGRENE OF TOE OF RIGHT FOOT (HCC): ICD-10-CM

## 2017-11-23 DIAGNOSIS — R62.7 FAILURE TO THRIVE IN ADULT: ICD-10-CM

## 2017-11-23 LAB
ANION GAP SERPL CALC-SCNC: 13 MMOL/L (ref 0–11.9)
BASOPHILS # BLD AUTO: 0.9 % (ref 0–1.8)
BASOPHILS # BLD AUTO: 0.9 % (ref 0–1.8)
BASOPHILS # BLD: 0.09 K/UL (ref 0–0.12)
BASOPHILS # BLD: 0.1 K/UL (ref 0–0.12)
BUN SERPL-MCNC: 17 MG/DL (ref 8–22)
CALCIUM SERPL-MCNC: 9.9 MG/DL (ref 8.5–10.5)
CHLORIDE SERPL-SCNC: 99 MMOL/L (ref 96–112)
CO2 SERPL-SCNC: 21 MMOL/L (ref 20–33)
CREAT SERPL-MCNC: 0.82 MG/DL (ref 0.5–1.4)
EOSINOPHIL # BLD AUTO: 0.19 K/UL (ref 0–0.51)
EOSINOPHIL # BLD AUTO: 0.19 K/UL (ref 0–0.51)
EOSINOPHIL NFR BLD: 1.8 % (ref 0–6.9)
EOSINOPHIL NFR BLD: 1.9 % (ref 0–6.9)
ERYTHROCYTE [DISTWIDTH] IN BLOOD BY AUTOMATED COUNT: 40.5 FL (ref 35.9–50)
ERYTHROCYTE [DISTWIDTH] IN BLOOD BY AUTOMATED COUNT: 41.1 FL (ref 35.9–50)
GFR SERPL CREATININE-BSD FRML MDRD: >60 ML/MIN/1.73 M 2
GLUCOSE BLD-MCNC: 131 MG/DL (ref 65–99)
GLUCOSE BLD-MCNC: 134 MG/DL (ref 65–99)
GLUCOSE BLD-MCNC: 165 MG/DL (ref 65–99)
GLUCOSE SERPL-MCNC: 148 MG/DL (ref 65–99)
HCT VFR BLD AUTO: 46.8 % (ref 42–52)
HCT VFR BLD AUTO: 50.2 % (ref 42–52)
HGB BLD-MCNC: 15.2 G/DL (ref 14–18)
HGB BLD-MCNC: 16.6 G/DL (ref 14–18)
IMM GRANULOCYTES # BLD AUTO: 0.04 K/UL (ref 0–0.11)
IMM GRANULOCYTES # BLD AUTO: 0.05 K/UL (ref 0–0.11)
IMM GRANULOCYTES NFR BLD AUTO: 0.4 % (ref 0–0.9)
IMM GRANULOCYTES NFR BLD AUTO: 0.5 % (ref 0–0.9)
LYMPHOCYTES # BLD AUTO: 2.49 K/UL (ref 1–4.8)
LYMPHOCYTES # BLD AUTO: 2.55 K/UL (ref 1–4.8)
LYMPHOCYTES NFR BLD: 24.1 % (ref 22–41)
LYMPHOCYTES NFR BLD: 24.7 % (ref 22–41)
MCH RBC QN AUTO: 27 PG (ref 27–33)
MCH RBC QN AUTO: 27.6 PG (ref 27–33)
MCHC RBC AUTO-ENTMCNC: 32.5 G/DL (ref 33.7–35.3)
MCHC RBC AUTO-ENTMCNC: 33.1 G/DL (ref 33.7–35.3)
MCV RBC AUTO: 83.1 FL (ref 81.4–97.8)
MCV RBC AUTO: 83.4 FL (ref 81.4–97.8)
MONOCYTES # BLD AUTO: 0.63 K/UL (ref 0–0.85)
MONOCYTES # BLD AUTO: 0.69 K/UL (ref 0–0.85)
MONOCYTES NFR BLD AUTO: 5.9 % (ref 0–13.4)
MONOCYTES NFR BLD AUTO: 6.8 % (ref 0–13.4)
NEUTROPHILS # BLD AUTO: 6.6 K/UL (ref 1.82–7.42)
NEUTROPHILS # BLD AUTO: 7.07 K/UL (ref 1.82–7.42)
NEUTROPHILS NFR BLD: 65.3 % (ref 44–72)
NEUTROPHILS NFR BLD: 66.8 % (ref 44–72)
NRBC # BLD AUTO: 0 K/UL
NRBC # BLD AUTO: 0 K/UL
NRBC BLD AUTO-RTO: 0 /100 WBC
NRBC BLD AUTO-RTO: 0 /100 WBC
PLATELET # BLD AUTO: 326 K/UL (ref 164–446)
PLATELET # BLD AUTO: 355 K/UL (ref 164–446)
PMV BLD AUTO: 10.2 FL (ref 9–12.9)
PMV BLD AUTO: 10.2 FL (ref 9–12.9)
POTASSIUM SERPL-SCNC: 4 MMOL/L (ref 3.6–5.5)
RBC # BLD AUTO: 5.63 M/UL (ref 4.7–6.1)
RBC # BLD AUTO: 6.02 M/UL (ref 4.7–6.1)
SODIUM SERPL-SCNC: 133 MMOL/L (ref 135–145)
WBC # BLD AUTO: 10.1 K/UL (ref 4.8–10.8)
WBC # BLD AUTO: 10.6 K/UL (ref 4.8–10.8)

## 2017-11-23 PROCEDURE — G0378 HOSPITAL OBSERVATION PER HR: HCPCS

## 2017-11-23 PROCEDURE — 82947 ASSAY GLUCOSE BLOOD QUANT: CPT

## 2017-11-23 PROCEDURE — 73564 X-RAY EXAM KNEE 4 OR MORE: CPT | Mod: LT

## 2017-11-23 PROCEDURE — 306780 HCHG STAT FOR TRANSFUSION PER CASE

## 2017-11-23 PROCEDURE — 36592 COLLECT BLOOD FROM PICC: CPT

## 2017-11-23 PROCEDURE — 700105 HCHG RX REV CODE 258: Performed by: INTERNAL MEDICINE

## 2017-11-23 PROCEDURE — A9270 NON-COVERED ITEM OR SERVICE: HCPCS | Performed by: INTERNAL MEDICINE

## 2017-11-23 PROCEDURE — 80048 BASIC METABOLIC PNL TOTAL CA: CPT

## 2017-11-23 PROCEDURE — 700111 HCHG RX REV CODE 636 W/ 250 OVERRIDE (IP): Performed by: INTERNAL MEDICINE

## 2017-11-23 PROCEDURE — 82962 GLUCOSE BLOOD TEST: CPT

## 2017-11-23 PROCEDURE — 700102 HCHG RX REV CODE 250 W/ 637 OVERRIDE(OP): Performed by: INTERNAL MEDICINE

## 2017-11-23 PROCEDURE — 99285 EMERGENCY DEPT VISIT HI MDM: CPT

## 2017-11-23 PROCEDURE — 700101 HCHG RX REV CODE 250: Performed by: INTERNAL MEDICINE

## 2017-11-23 PROCEDURE — 85025 COMPLETE CBC W/AUTO DIFF WBC: CPT

## 2017-11-23 PROCEDURE — 700105 HCHG RX REV CODE 258: Performed by: EMERGENCY MEDICINE

## 2017-11-23 PROCEDURE — 99220 PR INITIAL OBSERVATION CARE,LEVL III: CPT | Performed by: INTERNAL MEDICINE

## 2017-11-23 RX ORDER — AMOXICILLIN 250 MG
2 CAPSULE ORAL 2 TIMES DAILY
Status: DISCONTINUED | OUTPATIENT
Start: 2017-11-23 | End: 2017-11-28

## 2017-11-23 RX ORDER — PROMETHAZINE HYDROCHLORIDE 12.5 MG/1
12.5-25 SUPPOSITORY RECTAL EVERY 4 HOURS PRN
Status: DISCONTINUED | OUTPATIENT
Start: 2017-11-23 | End: 2017-11-28

## 2017-11-23 RX ORDER — HEPARIN SODIUM 5000 [USP'U]/ML
5000 INJECTION, SOLUTION INTRAVENOUS; SUBCUTANEOUS EVERY 8 HOURS
Status: DISCONTINUED | OUTPATIENT
Start: 2017-11-23 | End: 2017-12-01 | Stop reason: HOSPADM

## 2017-11-23 RX ORDER — PROMETHAZINE HYDROCHLORIDE 25 MG/1
12.5-25 TABLET ORAL EVERY 4 HOURS PRN
Status: DISCONTINUED | OUTPATIENT
Start: 2017-11-23 | End: 2017-11-28

## 2017-11-23 RX ORDER — DEXTROSE AND SODIUM CHLORIDE 5; .45 G/100ML; G/100ML
INJECTION, SOLUTION INTRAVENOUS
Status: ACTIVE
Start: 2017-11-23 | End: 2017-11-24

## 2017-11-23 RX ORDER — ONDANSETRON 4 MG/1
4 TABLET, ORALLY DISINTEGRATING ORAL EVERY 8 HOURS PRN
Qty: 10 TAB | Refills: 0 | Status: SHIPPED | OUTPATIENT
Start: 2017-11-23 | End: 2017-11-23

## 2017-11-23 RX ORDER — ONDANSETRON 2 MG/ML
4 INJECTION INTRAMUSCULAR; INTRAVENOUS EVERY 4 HOURS PRN
Status: DISCONTINUED | OUTPATIENT
Start: 2017-11-23 | End: 2017-12-01 | Stop reason: HOSPADM

## 2017-11-23 RX ORDER — DEXTROSE MONOHYDRATE 25 G/50ML
25 INJECTION, SOLUTION INTRAVENOUS
Status: DISCONTINUED | OUTPATIENT
Start: 2017-11-23 | End: 2017-12-01 | Stop reason: HOSPADM

## 2017-11-23 RX ORDER — AMLODIPINE BESYLATE 5 MG/1
5 TABLET ORAL DAILY
Status: DISCONTINUED | OUTPATIENT
Start: 2017-11-23 | End: 2017-11-28

## 2017-11-23 RX ORDER — POLYETHYLENE GLYCOL 3350 17 G/17G
1 POWDER, FOR SOLUTION ORAL
Status: DISCONTINUED | OUTPATIENT
Start: 2017-11-23 | End: 2017-11-28

## 2017-11-23 RX ORDER — SODIUM CHLORIDE 9 MG/ML
INJECTION, SOLUTION INTRAVENOUS CONTINUOUS
Status: DISCONTINUED | OUTPATIENT
Start: 2017-11-23 | End: 2017-11-23

## 2017-11-23 RX ORDER — ONDANSETRON 4 MG/1
4 TABLET, ORALLY DISINTEGRATING ORAL EVERY 4 HOURS PRN
Status: DISCONTINUED | OUTPATIENT
Start: 2017-11-23 | End: 2017-12-01 | Stop reason: HOSPADM

## 2017-11-23 RX ORDER — LISINOPRIL 2.5 MG/1
5 TABLET ORAL DAILY
Status: DISCONTINUED | OUTPATIENT
Start: 2017-11-23 | End: 2017-11-24

## 2017-11-23 RX ORDER — OXYCODONE HYDROCHLORIDE 5 MG/1
5 TABLET ORAL EVERY 4 HOURS PRN
Status: DISCONTINUED | OUTPATIENT
Start: 2017-11-23 | End: 2017-11-28

## 2017-11-23 RX ORDER — ACETAMINOPHEN 325 MG/1
650 TABLET ORAL EVERY 6 HOURS PRN
Status: DISCONTINUED | OUTPATIENT
Start: 2017-11-23 | End: 2017-11-28

## 2017-11-23 RX ORDER — ACETAMINOPHEN 500 MG
500 TABLET ORAL EVERY 6 HOURS
Status: DISCONTINUED | OUTPATIENT
Start: 2017-11-23 | End: 2017-11-28

## 2017-11-23 RX ORDER — BISACODYL 10 MG
10 SUPPOSITORY, RECTAL RECTAL
Status: DISCONTINUED | OUTPATIENT
Start: 2017-11-23 | End: 2017-11-28

## 2017-11-23 RX ORDER — SODIUM CHLORIDE 9 MG/ML
INJECTION, SOLUTION INTRAVENOUS CONTINUOUS
Status: DISPENSED | OUTPATIENT
Start: 2017-11-23 | End: 2017-11-24

## 2017-11-23 RX ORDER — CEFAZOLIN SODIUM 2 G/100ML
2 INJECTION, SOLUTION INTRAVENOUS EVERY 8 HOURS
Status: DISCONTINUED | OUTPATIENT
Start: 2017-11-23 | End: 2017-11-28

## 2017-11-23 RX ADMIN — ACETAMINOPHEN 500 MG: 500 TABLET ORAL at 23:43

## 2017-11-23 RX ADMIN — METRONIDAZOLE 500 MG: 500 INJECTION, SOLUTION INTRAVENOUS at 23:43

## 2017-11-23 RX ADMIN — SODIUM CHLORIDE: 9 INJECTION, SOLUTION INTRAVENOUS at 22:00

## 2017-11-23 RX ADMIN — HEPARIN SODIUM 5000 UNITS: 5000 INJECTION, SOLUTION INTRAVENOUS; SUBCUTANEOUS at 14:41

## 2017-11-23 RX ADMIN — CEFAZOLIN SODIUM 2 G: 2 INJECTION, SOLUTION INTRAVENOUS at 14:42

## 2017-11-23 RX ADMIN — METRONIDAZOLE 500 MG: 500 INJECTION, SOLUTION INTRAVENOUS at 15:34

## 2017-11-23 RX ADMIN — LISINOPRIL 5 MG: 2.5 TABLET ORAL at 15:30

## 2017-11-23 RX ADMIN — CEFAZOLIN SODIUM 2 G: 2 INJECTION, SOLUTION INTRAVENOUS at 21:40

## 2017-11-23 RX ADMIN — AMLODIPINE BESYLATE 5 MG: 5 TABLET ORAL at 15:30

## 2017-11-23 RX ADMIN — HEPARIN SODIUM 5000 UNITS: 5000 INJECTION, SOLUTION INTRAVENOUS; SUBCUTANEOUS at 21:40

## 2017-11-23 RX ADMIN — SODIUM CHLORIDE: 9 INJECTION, SOLUTION INTRAVENOUS at 12:00

## 2017-11-23 RX ADMIN — ACETAMINOPHEN 500 MG: 500 TABLET ORAL at 19:14

## 2017-11-23 RX ADMIN — ACETAMINOPHEN 500 MG: 500 TABLET ORAL at 14:41

## 2017-11-23 ASSESSMENT — ENCOUNTER SYMPTOMS
DEPRESSION: 0
LOSS OF CONSCIOUSNESS: 0
FEVER: 0
TINGLING: 0
NAUSEA: 0
CHILLS: 0
WEAKNESS: 1
DIZZINESS: 0
FOCAL WEAKNESS: 0
SHORTNESS OF BREATH: 0
NECK PAIN: 0
MYALGIAS: 0
VOMITING: 0
HEADACHES: 0
ABDOMINAL PAIN: 0
PALPITATIONS: 0
BLURRED VISION: 0

## 2017-11-23 ASSESSMENT — COPD QUESTIONNAIRES
COPD SCREENING SCORE: 1
DURING THE PAST 4 WEEKS HOW MUCH DID YOU FEEL SHORT OF BREATH: NONE/LITTLE OF THE TIME
DO YOU EVER COUGH UP ANY MUCUS OR PHLEGM?: NO/ONLY WITH OCCASIONAL COLDS OR INFECTIONS
HAVE YOU SMOKED AT LEAST 100 CIGARETTES IN YOUR ENTIRE LIFE: NO/DON'T KNOW

## 2017-11-23 ASSESSMENT — LIFESTYLE VARIABLES
ALCOHOL_USE: NO
EVER_SMOKED: NEVER

## 2017-11-23 ASSESSMENT — PAIN SCALES - GENERAL: PAINLEVEL_OUTOF10: 2

## 2017-11-23 NOTE — ED NOTES
"Med rec updated and complete  Allergies reviewed  Pt states \"I did not get my INVANZ 100MG today\".  Pt states \"I have not picked up my ZOFRAN 4MG or my OXYCODONE IR 5MG at the pharmacy\".  Pt states \"No vitamins or OTC'S\".    "

## 2017-11-23 NOTE — ED NOTES
Pt was at infusion center for antibiotic infusion when his left knee gave out on him. Patient has been seen in the ED several time in the past week for weakness and admitted several times for weakness.

## 2017-11-23 NOTE — PROGRESS NOTES
Patient arrived to clinic for Invanz infusion.  Patient was in wheelchair and when standing to take weight patient fell forward onto knees per the CNA.  Patient was assisted to lay on the floor.  He complained of 7/10 pain in left knee, responding appropriately, and denied any other discomfort.   Vital signs checked, patient reported he was a diabetic and had not eaten since yesterday.  Istat glucose level checked and was 165.  Security called for assistance and patient lifted to stretcher and was transported to ED with security and Rn.  Handoff given to ED nurse.

## 2017-11-23 NOTE — DISCHARGE PLANNING
Medical Social Work  LUNA consulted by RN regarding Pt needing transportation home. Pt is Medicaid Pending and states he has no other way to get home. LUNA assisted with Cab Voucher [phone].

## 2017-11-23 NOTE — ED PROVIDER NOTES
CHIEF COMPLAINT  Chief Complaint   Patient presents with   • Weakness   • Nausea       HPI  Sebastián Castro is a 52 y.o. male who presents after a ground-level fall today while using his walker. States that his right leg just gave out. He had a prolonged hospitalization recently for right great toe amputation. No fevers. States that he did not have a syncopal episode. No head trauma. No neck pain or back pain as a result of a fall. He states that his wound appears to be healing with no acute changes. He had some nausea after the fall. No vomiting. No abdominal pain.    REVIEW OF SYSTEMS  See HPI for further details. All other systems are negative.     PAST MEDICAL HISTORY   has a past medical history of Hypertension.    SOCIAL HISTORY  Social History     Social History Main Topics   • Smoking status: Never Smoker   • Smokeless tobacco: Never Used   • Alcohol use No   • Drug use: Unknown   • Sexual activity: Not on file       SURGICAL HISTORY   has a past surgical history that includes toe amputation (Right, 11/10/2017).    CURRENT MEDICATIONS  Home Medications     Reviewed by Ling Pierson R.N. (Registered Nurse) on 11/22/17 at 2326  Med List Status: <None>   Medication Last Dose Status   amlodipine (NORVASC) 5 MG Tab  Active   lisinopril (PRINIVIL) 5 MG Tab  Active   metformin (GLUCOPHAGE) 500 MG Tab 11/21/2017 Active   oxycodone immediate-release (ROXICODONE) 5 MG Tab 11/21/2017 Active                ALLERGIES  Allergies   Allergen Reactions   • Pcn [Penicillins] Hives and Rash      Rash & hives  Tolerating Ampicillin + Sulbactam 11/12/17       PHYSICAL EXAM  VITAL SIGNS: /70   Pulse 88   Temp 36.2 °C (97.2 °F)   Resp 18   Ht 1.829 m (6')   Wt (!) 139 kg (306 lb 7 oz)   SpO2 98%   BMI 41.56 kg/m²   Pulse ox interpretation: I interpret this pulse ox as normal.  Constitutional: Alert in no apparent distress.  HENT: No signs of trauma, Bilateral external ears normal, Nose normal.   Eyes: Pupils are  "equal and reactive, Conjunctiva normal, Non-icteric.   Neck: Normal range of motion, No tenderness, Supple, No stridor.   Cardiovascular: Regular rate and rhythm, no murmurs.   Thorax & Lungs: Normal breath sounds, No respiratory distress, No wheezing, No chest tenderness.   Abdomen: Bowel sounds normal, Soft, No tenderness, No masses, No pulsatile masses. No peritoneal signs.  Skin: Warm, Dry, No erythema, Chronic wound to the right foot status post great toe amputation, sutures still in place, no wound dehiscence. No bleeding. No purulent drainage. Scant clear discharge without gross purulent discharge or erythema.  Back: No bony tenderness, No CVA tenderness.   Extremities: Intact distal pulses, No edema, No tenderness, No cyanosis  Musculoskeletal: Good range of motion in all major joints. No tenderness to palpation or major deformities noted.   Neurologic: Alert , Normal motor function and gait, Normal sensory function, No focal deficits noted.       DIAGNOSTIC STUDIES / PROCEDURES    LABS  Labs Reviewed   COMP METABOLIC PANEL - Abnormal; Notable for the following:        Result Value    Sodium 133 (*)     Anion Gap 12.0 (*)     Glucose 151 (*)     Total Protein 8.5 (*)     Globulin 4.7 (*)     All other components within normal limits   CBC WITH DIFFERENTIAL - Abnormal; Notable for the following:     MCHC 32.5 (*)     All other components within normal limits    Narrative:     SPECIMEN IS A RECOLLECT   ESTIMATED GFR       RADIOLOGY  No orders to display       COURSE & MEDICAL DECISION MAKING  Pertinent Labs & Imaging studies reviewed. (See chart for details)  52 y.o. male presenting after a ground-level fall today. No obvious signs of trauma. States he fell after his right leg \"gave out\". No focal neurologic deficits on physical exam. The affected leg had recent toe amputation. May have some deconditioning from prolonged hospitalization recently. No obvious bony abnormalities. No consistent signs of trauma. No " wound dehiscence or signs of sepsis.    Basic laboratory studies were performed. No leukocytosis or acidosis. No significant electrolyte abnormalities. Patient denies any headache, neck pain, chest pain, shortness of breath.  Patient absolutely denies syncope or loss of consciousness. No reason to suspect pulmonary embolism, ACS, arrhythmia, intracranial hemorrhage / subarachnoid hemorrhage.    Patient was reevaluated and all laboratory studies are reviewed with the patient. He states he feels improved. No acute abnormalities require further inpatient management. Check follow-up with his primary care physician and wound care specialist for further management.    The patient will return for worsening symptoms or failure of improvement and is stable at the time of discharge. The patient verbalizes understanding in their own words.    /70   Pulse 74   Temp 36.2 °C (97.2 °F)   Resp 16   Ht 1.829 m (6')   Wt (!) 139 kg (306 lb 7 oz)   SpO2 94%   BMI 41.56 kg/m²     Pcp Pt States None    In 2 days      [facility], Emergency Dept  [address] [place] [id number] [phone]    As needed, If symptoms worsen      FINAL IMPRESSION  1. Fall from ground level    2. Nausea    3.      Chronic right foot wound        Electronically signed by: Diogenes Wills, 11/22/2017 11:53 PM

## 2017-11-23 NOTE — ED NOTES
Patient was educated on discharge instructions.  Patient was informed about diagnosis, symptom management, risks, and home care instructions.  Patient verbalized understanding and signed discharge instructions. Prescriptions handed to patient. Copy of discharge instructions in chart.  Patient ambulated out with steady gait using FWW.  Patient has personal belongings. Cab voucher given to pt. Pt placed in [facility]

## 2017-11-23 NOTE — ED PROVIDER NOTES
ED Provider Note    Scribed for Addison Infante M.D. by Cammie Cardona. 11/23/2017  10:41 AM    Primary care provider: Pcp Pt States None  Means of arrival: ambulance   History obtained from: patient   History limited by: none       CHIEF COMPLAINT  Chief Complaint   Patient presents with   • Weakness   • Knee Pain       HPI  Sebastián Castro is a 52 y.o. male who presents to the Emergency Department for evaluation of left knee pain onset today. The patient reports he was at the infusion center when his left knee gave out, causing him to fall onto his knee.  Movement exacerbates his knee pain.  He confirms that he did not get his antibiotic infusion. Patient has been seen several times in the ED for weakness. Negative fever.       REVIEW OF SYSTEMS  Pertinent positives include left knee pain, weakness.   Pertinent negatives include no fever.    All other systems reviewed and negative. C.       PAST MEDICAL HISTORY   has a past medical history of Hypertension.      SURGICAL HISTORY   has a past surgical history that includes toe amputation (Right, 11/10/2017).      SOCIAL HISTORY  Social History   Substance Use Topics   • Smoking status: Never Smoker   • Smokeless tobacco: Never Used   • Alcohol use No      History   Drug use: Unknown       FAMILY HISTORY  None noted       CURRENT MEDICATIONS  Home Medications    **Home medications have not yet been reviewed for this encounter**         ALLERGIES  Allergies   Allergen Reactions   • Pcn [Penicillins] Hives and Rash      Rash & hives  Tolerating Ampicillin + Sulbactam 11/12/17         PHYSICAL EXAM  VITAL SIGNS: /82   Pulse (!) 108   Temp 36.4 °C (97.5 °F)   Resp 17   Wt (!) 145 kg (319 lb 10.7 oz)   SpO2 97%   BMI 43.35 kg/m²   Vital signs reviewed.  Constitutional:  Appears well-developed and well-nourished. No distress.   Head: Normocephalic  Mouth/Throat: Oropharynx is clear and moist.   Neck: Normal range of motion. Neck supple.   Cardiovascular:  Tachycardic. Regular rhythm.   Pulmonary/Chest: Clear to auscultation bilaterally.   Abdominal: Morbidly obese. Soft. There is no tenderness.   Musculoskeletal: No pelvic wall tenderness. Right toe amputation. Marked left patella tenderness. Tenderness over the left proximal fibula. Extensor mechanism is intact.  Lymphadenopathy: No cervical adenopathy.   Neurological: Patient is alert and oriented to person, place, and time.  Normal sensation and strength.  Skin: Warm and dry. Blanching rash over the chest.   Psychiatric: Patient has a normal mood and affect. Behavior is normal.         LABS  Results for orders placed or performed during the hospital encounter of 11/23/17   CBC WITH DIFFERENTIAL   Result Value Ref Range    WBC 10.1 4.8 - 10.8 K/uL    RBC 6.02 4.70 - 6.10 M/uL    Hemoglobin 16.6 14.0 - 18.0 g/dL    Hematocrit 50.2 42.0 - 52.0 %    MCV 83.4 81.4 - 97.8 fL    MCH 27.6 27.0 - 33.0 pg    MCHC 33.1 (L) 33.7 - 35.3 g/dL    RDW 41.1 35.9 - 50.0 fL    Platelet Count 326 164 - 446 K/uL    MPV 10.2 9.0 - 12.9 fL    Neutrophils-Polys 65.30 44.00 - 72.00 %    Lymphocytes 24.70 22.00 - 41.00 %    Monocytes 6.80 0.00 - 13.40 %    Eosinophils 1.90 0.00 - 6.90 %    Basophils 0.90 0.00 - 1.80 %    Immature Granulocytes 0.40 0.00 - 0.90 %    Nucleated RBC 0.00 /100 WBC    Neutrophils (Absolute) 6.60 1.82 - 7.42 K/uL    Lymphs (Absolute) 2.49 1.00 - 4.80 K/uL    Monos (Absolute) 0.69 0.00 - 0.85 K/uL    Eos (Absolute) 0.19 0.00 - 0.51 K/uL    Baso (Absolute) 0.09 0.00 - 0.12 K/uL    Immature Granulocytes (abs) 0.04 0.00 - 0.11 K/uL    NRBC (Absolute) 0.00 K/uL   BASIC METABOLIC PANEL   Result Value Ref Range    Sodium 133 (L) 135 - 145 mmol/L    Potassium 4.0 3.6 - 5.5 mmol/L    Chloride 99 96 - 112 mmol/L    Co2 21 20 - 33 mmol/L    Glucose 148 (H) 65 - 99 mg/dL    Bun 17 8 - 22 mg/dL    Creatinine 0.82 0.50 - 1.40 mg/dL    Calcium 9.9 8.5 - 10.5 mg/dL    Anion Gap 13.0 (H) 0.0 - 11.9   ESTIMATED GFR   Result Value  Ref Range    GFR If African American >60 >60 mL/min/1.73 m 2    GFR If Non African American >60 >60 mL/min/1.73 m 2   All labs reviewed by me.      RADIOLOGY  DX-KNEE COMPLETE 4+ LEFT   Final Result      1.  No evidence of acute fracture or dislocation.      2.  Mild tricompartment degenerative change.      The radiologist's interpretation of all radiological studies have been reviewed by me.      COURSE & MEDICAL DECISION MAKING  Pertinent Labs & Imaging studies reviewed. (See chart for details) The patient's [facility] and past medical records were reviewed    10:41 AM - Patient seen and examined at bedside. Patient will be treated with IV fluids secondary to tachycardia. Ordered DX knee, CBC and CMP to evaluate his symptoms.     10:53 AM Obtained and reviewed past medical records which indicated the patient was admitted to the ED on 11/21/17 for weakness. He was also seen yesterday after having a fall.     12:20 PM  Patient reevaluated at bedside. Discussed diagnostic results with the patient in addition to plan of care. He agrees to admission. Paged the hospitalist. Patient is quite weak. He is barely able to manage on his own at home as it is. He's been admitted to the hospital this week went home, and has now come back to the ER twice. He does not seem capable of caring for himself    12:27 PM Consult with Dr. Patel, hospitalist, who agrees to admit the patient.       DISPOSITION:  Patient will be admitted to Dr. Patel in guarded condition.        FINAL IMPRESSION  1. Acute pain of left knee    2. Dehydration    3. Failure to thrive     Cammie CLARKE (Antonina), am scribing for, and in the presence of, Addison Infante M.D..  Electronically signed by: Cammie Cardona (Antonina), 11/23/2017  Addison CLARKE M.D. personally performed the services described in this documentation, as scribed by Cammie Cardona in my presence, and it is both accurate and complete.    The note accurately reflects  work and decisions made by me.  Addison Infante  11/23/2017  2:04 PM

## 2017-11-23 NOTE — DISCHARGE INSTRUCTIONS
Nausea and Vomiting  Nausea means you feel sick to your stomach. Throwing up (vomiting) is a reflex where stomach contents come out of your mouth.  HOME CARE   · Take medicine as told by your doctor.  · Do not force yourself to eat. However, you do need to drink fluids.  · If you feel like eating, eat a normal diet as told by your doctor.  ¨ Eat rice, wheat, potatoes, bread, lean meats, yogurt, fruits, and vegetables.  ¨ Avoid high-fat foods.  · Drink enough fluids to keep your pee (urine) clear or pale yellow.  · Ask your doctor how to replace body fluid losses (rehydrate). Signs of body fluid loss (dehydration) include:  ¨ Feeling very thirsty.  ¨ Dry lips and mouth.  ¨ Feeling dizzy.  ¨ Dark pee.  ¨ Peeing less than normal.  ¨ Feeling confused.  ¨ Fast breathing or heart rate.  GET HELP RIGHT AWAY IF:   · You have blood in your throw up.  · You have black or bloody poop (stool).  · You have a bad headache or stiff neck.  · You feel confused.  · You have bad belly (abdominal) pain.  · You have chest pain or trouble breathing.  · You do not pee at least once every 8 hours.  · You have cold, clammy skin.  · You keep throwing up after 24 to 48 hours.  · You have a fever.  MAKE SURE YOU:   · Understand these instructions.  · Will watch your condition.  · Will get help right away if you are not doing well or get worse.     This information is not intended to replace advice given to you by your health care provider. Make sure you discuss any questions you have with your health care provider.     Document Released: 06/05/2009 Document Revised: 03/11/2013 Document Reviewed: 05/18/2012  [employer] Interactive Patient Education ©2016 [employer]    Fall Prevention in the Home   Falls can cause injuries and can affect people from all age groups. There are many simple things that you can do to make your home safe and to help prevent falls.  WHAT CAN I DO ON THE OUTSIDE OF MY HOME?  · Regularly repair the edges of walkways  and driveways and fix any cracks.  · Remove high doorway thresholds.  · Trim any shrubbery on the main path into your home.  · Use bright outdoor lighting.  · Clear walkways of debris and clutter, including tools and rocks.  · Regularly check that handrails are securely fastened and in good repair. Both sides of any steps should have handrails.  · Install guardrails along the edges of any raised decks or porches.  · Have leaves, snow, and ice cleared regularly.  · Use sand or salt on walkways during winter months.  · In the garage, clean up any spills right away, including grease or oil spills.  WHAT CAN I DO IN THE BATHROOM?  · Use night lights.  · Install grab bars by the toilet and in the tub and shower. Do not use towel bars as grab bars.  · Use non-skid mats or decals on the floor of the tub or shower.  · If you need to sit down while you are in the shower, use a plastic, non-slip stool..  · Keep the floor dry. Immediately clean up any water that spills on the floor.  · Remove soap buildup in the tub or shower on a regular basis.  · Attach bath mats securely with double-sided non-slip rug tape.  · Remove throw rugs and other tripping hazards from the floor.  WHAT CAN I DO IN THE BEDROOM?  · Use night lights.  · Make sure that a bedside light is easy to reach.  · Do not use oversized bedding that drapes onto the floor.  · Have a firm chair that has side arms to use for getting dressed.  · Remove throw rugs and other tripping hazards from the floor.  WHAT CAN I DO IN THE KITCHEN?   · Clean up any spills right away.  · Avoid walking on wet floors.  · Place frequently used items in easy-to-reach places.  · If you need to reach for something above you, use a sturdy step stool that has a grab bar.  · Keep electrical cables out of the way.  · Do not use floor polish or wax that makes floors slippery. If you have to use wax, make sure that it is non-skid floor wax.  · Remove throw rugs and other tripping hazards from  the floor.  WHAT CAN I DO IN THE STAIRWAYS?  · Do not leave any items on the stairs.  · Make sure that there are handrails on both sides of the stairs. Fix handrails that are broken or loose. Make sure that handrails are as long as the stairways.  · Check any carpeting to make sure that it is firmly attached to the stairs. Fix any carpet that is loose or worn.  · Avoid having throw rugs at the top or bottom of stairways, or secure the rugs with carpet tape to prevent them from moving.  · Make sure that you have a light switch at the top of the stairs and the bottom of the stairs. If you do not have them, have them installed.  WHAT ARE SOME OTHER FALL PREVENTION TIPS?  · Wear closed-toe shoes that fit well and support your feet. Wear shoes that have rubber soles or low heels.  · When you use a stepladder, make sure that it is completely opened and that the sides are firmly locked. Have someone hold the ladder while you are using it. Do not climb a closed stepladder.  · Add color or contrast paint or tape to grab bars and handrails in your home. Place contrasting color strips on the first and last steps.  · Use mobility aids as needed, such as canes, walkers, scooters, and crutches.  · Turn on lights if it is dark. Replace any light bulbs that burn out.  · Set up furniture so that there are clear paths. Keep the furniture in the same spot.  · Fix any uneven floor surfaces.  · Choose a carpet design that does not hide the edge of steps of a stairway.  · Be aware of any and all pets.  · Review your medicines with your healthcare provider. Some medicines can cause dizziness or changes in blood pressure, which increase your risk of falling.  Talk with your health care provider about other ways that you can decrease your risk of falls. This may include working with a physical therapist or  to improve your strength, balance, and endurance.     This information is not intended to replace advice given to you by your  health care provider. Make sure you discuss any questions you have with your health care provider.     Document Released: 12/08/2003 Document Revised: 05/03/2016 Document Reviewed: 01/22/2016  [employer] Interactive Patient Education ©2016 [employer]

## 2017-11-24 ENCOUNTER — APPOINTMENT (OUTPATIENT)
Dept: RADIOLOGY | Facility: MEDICAL CENTER | Age: 52
DRG: 563 | End: 2017-11-24
Attending: INTERNAL MEDICINE
Payer: MEDICAID

## 2017-11-24 ENCOUNTER — OUTPATIENT INFUSION SERVICES (OUTPATIENT)
Dept: ONCOLOGY | Facility: MEDICAL CENTER | Age: 52
End: 2017-11-24
Attending: INTERNAL MEDICINE
Payer: MEDICAID

## 2017-11-24 LAB
ANION GAP SERPL CALC-SCNC: 14 MMOL/L (ref 0–11.9)
BASOPHILS # BLD AUTO: 0.9 % (ref 0–1.8)
BASOPHILS # BLD: 0.07 K/UL (ref 0–0.12)
BUN SERPL-MCNC: 13 MG/DL (ref 8–22)
CALCIUM SERPL-MCNC: 8.8 MG/DL (ref 8.5–10.5)
CHLORIDE SERPL-SCNC: 98 MMOL/L (ref 96–112)
CO2 SERPL-SCNC: 24 MMOL/L (ref 20–33)
CREAT SERPL-MCNC: 0.52 MG/DL (ref 0.5–1.4)
EOSINOPHIL # BLD AUTO: 0.19 K/UL (ref 0–0.51)
EOSINOPHIL NFR BLD: 2.5 % (ref 0–6.9)
ERYTHROCYTE [DISTWIDTH] IN BLOOD BY AUTOMATED COUNT: 40.4 FL (ref 35.9–50)
GFR SERPL CREATININE-BSD FRML MDRD: >60 ML/MIN/1.73 M 2
GLUCOSE BLD-MCNC: 129 MG/DL (ref 65–99)
GLUCOSE BLD-MCNC: 143 MG/DL (ref 65–99)
GLUCOSE BLD-MCNC: 168 MG/DL (ref 65–99)
GLUCOSE BLD-MCNC: 172 MG/DL (ref 65–99)
GLUCOSE SERPL-MCNC: 136 MG/DL (ref 65–99)
HCT VFR BLD AUTO: 46.8 % (ref 42–52)
HGB BLD-MCNC: 15.6 G/DL (ref 14–18)
IMM GRANULOCYTES # BLD AUTO: 0.02 K/UL (ref 0–0.11)
IMM GRANULOCYTES NFR BLD AUTO: 0.3 % (ref 0–0.9)
LYMPHOCYTES # BLD AUTO: 3.22 K/UL (ref 1–4.8)
LYMPHOCYTES NFR BLD: 41.9 % (ref 22–41)
MAGNESIUM SERPL-MCNC: 1.7 MG/DL (ref 1.5–2.5)
MCH RBC QN AUTO: 27.7 PG (ref 27–33)
MCHC RBC AUTO-ENTMCNC: 33.3 G/DL (ref 33.7–35.3)
MCV RBC AUTO: 83 FL (ref 81.4–97.8)
MONOCYTES # BLD AUTO: 0.54 K/UL (ref 0–0.85)
MONOCYTES NFR BLD AUTO: 7 % (ref 0–13.4)
NEUTROPHILS # BLD AUTO: 3.64 K/UL (ref 1.82–7.42)
NEUTROPHILS NFR BLD: 47.4 % (ref 44–72)
NRBC # BLD AUTO: 0 K/UL
NRBC BLD AUTO-RTO: 0 /100 WBC
PLATELET # BLD AUTO: 314 K/UL (ref 164–446)
PMV BLD AUTO: 10.7 FL (ref 9–12.9)
POTASSIUM SERPL-SCNC: 3.4 MMOL/L (ref 3.6–5.5)
RBC # BLD AUTO: 5.64 M/UL (ref 4.7–6.1)
SODIUM SERPL-SCNC: 136 MMOL/L (ref 135–145)
WBC # BLD AUTO: 7.7 K/UL (ref 4.8–10.8)

## 2017-11-24 PROCEDURE — 97165 OT EVAL LOW COMPLEX 30 MIN: CPT

## 2017-11-24 PROCEDURE — 82962 GLUCOSE BLOOD TEST: CPT | Mod: 91

## 2017-11-24 PROCEDURE — 700111 HCHG RX REV CODE 636 W/ 250 OVERRIDE (IP): Performed by: INTERNAL MEDICINE

## 2017-11-24 PROCEDURE — 700102 HCHG RX REV CODE 250 W/ 637 OVERRIDE(OP): Performed by: INTERNAL MEDICINE

## 2017-11-24 PROCEDURE — 73700 CT LOWER EXTREMITY W/O DYE: CPT | Mod: LT

## 2017-11-24 PROCEDURE — G8978 MOBILITY CURRENT STATUS: HCPCS | Mod: CJ

## 2017-11-24 PROCEDURE — 80048 BASIC METABOLIC PNL TOTAL CA: CPT

## 2017-11-24 PROCEDURE — 83735 ASSAY OF MAGNESIUM: CPT

## 2017-11-24 PROCEDURE — G8979 MOBILITY GOAL STATUS: HCPCS | Mod: CI

## 2017-11-24 PROCEDURE — A9270 NON-COVERED ITEM OR SERVICE: HCPCS | Performed by: INTERNAL MEDICINE

## 2017-11-24 PROCEDURE — G8987 SELF CARE CURRENT STATUS: HCPCS | Mod: CJ

## 2017-11-24 PROCEDURE — G8988 SELF CARE GOAL STATUS: HCPCS | Mod: CI

## 2017-11-24 PROCEDURE — 700101 HCHG RX REV CODE 250: Performed by: INTERNAL MEDICINE

## 2017-11-24 PROCEDURE — G0378 HOSPITAL OBSERVATION PER HR: HCPCS

## 2017-11-24 PROCEDURE — 97162 PT EVAL MOD COMPLEX 30 MIN: CPT

## 2017-11-24 PROCEDURE — 99226 PR SUBSEQUENT OBSERVATION CARE,LEVEL III: CPT | Performed by: INTERNAL MEDICINE

## 2017-11-24 PROCEDURE — 85025 COMPLETE CBC W/AUTO DIFF WBC: CPT

## 2017-11-24 RX ORDER — LISINOPRIL 10 MG/1
10 TABLET ORAL DAILY
Status: DISCONTINUED | OUTPATIENT
Start: 2017-11-24 | End: 2017-11-28

## 2017-11-24 RX ORDER — LISINOPRIL 10 MG/1
10 TABLET ORAL DAILY
Status: DISCONTINUED | OUTPATIENT
Start: 2017-11-24 | End: 2017-11-24

## 2017-11-24 RX ORDER — METRONIDAZOLE 500 MG/1
500 TABLET ORAL EVERY 8 HOURS
Status: DISCONTINUED | OUTPATIENT
Start: 2017-11-24 | End: 2017-11-28

## 2017-11-24 RX ORDER — POTASSIUM CHLORIDE 20 MEQ/1
40 TABLET, EXTENDED RELEASE ORAL ONCE
Status: COMPLETED | OUTPATIENT
Start: 2017-11-24 | End: 2017-11-24

## 2017-11-24 RX ORDER — LISINOPRIL 20 MG/1
20 TABLET ORAL DAILY
Status: DISCONTINUED | OUTPATIENT
Start: 2017-11-24 | End: 2017-11-24

## 2017-11-24 RX ADMIN — CEFAZOLIN SODIUM 2 G: 2 INJECTION, SOLUTION INTRAVENOUS at 05:51

## 2017-11-24 RX ADMIN — HEPARIN SODIUM 5000 UNITS: 5000 INJECTION, SOLUTION INTRAVENOUS; SUBCUTANEOUS at 20:53

## 2017-11-24 RX ADMIN — HEPARIN SODIUM 5000 UNITS: 5000 INJECTION, SOLUTION INTRAVENOUS; SUBCUTANEOUS at 13:47

## 2017-11-24 RX ADMIN — STANDARDIZED SENNA CONCENTRATE AND DOCUSATE SODIUM 2 TABLET: 8.6; 5 TABLET, FILM COATED ORAL at 20:52

## 2017-11-24 RX ADMIN — METRONIDAZOLE 500 MG: 500 TABLET ORAL at 20:52

## 2017-11-24 RX ADMIN — METRONIDAZOLE 500 MG: 500 INJECTION, SOLUTION INTRAVENOUS at 06:00

## 2017-11-24 RX ADMIN — ONDANSETRON 4 MG: 4 TABLET, ORALLY DISINTEGRATING ORAL at 10:26

## 2017-11-24 RX ADMIN — INSULIN HUMAN 1 UNITS: 100 INJECTION, SOLUTION PARENTERAL at 16:42

## 2017-11-24 RX ADMIN — ACETAMINOPHEN 500 MG: 500 TABLET ORAL at 05:51

## 2017-11-24 RX ADMIN — ONDANSETRON 4 MG: 2 INJECTION INTRAMUSCULAR; INTRAVENOUS at 23:07

## 2017-11-24 RX ADMIN — INSULIN HUMAN 1 UNITS: 100 INJECTION, SOLUTION PARENTERAL at 20:53

## 2017-11-24 RX ADMIN — OXYCODONE HYDROCHLORIDE 5 MG: 5 TABLET ORAL at 20:57

## 2017-11-24 RX ADMIN — POTASSIUM CHLORIDE 40 MEQ: 1500 TABLET, EXTENDED RELEASE ORAL at 09:04

## 2017-11-24 RX ADMIN — AMLODIPINE BESYLATE 5 MG: 5 TABLET ORAL at 16:07

## 2017-11-24 RX ADMIN — METRONIDAZOLE 500 MG: 500 TABLET ORAL at 13:47

## 2017-11-24 RX ADMIN — LISINOPRIL 10 MG: 10 TABLET ORAL at 16:07

## 2017-11-24 RX ADMIN — ACETAMINOPHEN 500 MG: 500 TABLET ORAL at 11:31

## 2017-11-24 RX ADMIN — HEPARIN SODIUM 5000 UNITS: 5000 INJECTION, SOLUTION INTRAVENOUS; SUBCUTANEOUS at 05:52

## 2017-11-24 RX ADMIN — ACETAMINOPHEN 500 MG: 500 TABLET ORAL at 23:05

## 2017-11-24 RX ADMIN — CEFAZOLIN SODIUM 2 G: 2 INJECTION, SOLUTION INTRAVENOUS at 22:00

## 2017-11-24 RX ADMIN — CEFAZOLIN SODIUM 2 G: 2 INJECTION, SOLUTION INTRAVENOUS at 13:47

## 2017-11-24 RX ADMIN — ACETAMINOPHEN 500 MG: 500 TABLET ORAL at 17:54

## 2017-11-24 ASSESSMENT — COGNITIVE AND FUNCTIONAL STATUS - GENERAL
SUGGESTED CMS G CODE MODIFIER DAILY ACTIVITY: CJ
MOVING FROM LYING ON BACK TO SITTING ON SIDE OF FLAT BED: A LITTLE
MOBILITY SCORE: 17
SUGGESTED CMS G CODE MODIFIER MOBILITY: CK
DRESSING REGULAR LOWER BODY CLOTHING: A LITTLE
TOILETING: A LITTLE
MOVING TO AND FROM BED TO CHAIR: A LITTLE
HELP NEEDED FOR BATHING: A LITTLE
CLIMB 3 TO 5 STEPS WITH RAILING: A LOT
STANDING UP FROM CHAIR USING ARMS: A LITTLE
WALKING IN HOSPITAL ROOM: A LITTLE
TURNING FROM BACK TO SIDE WHILE IN FLAT BAD: A LITTLE
DAILY ACTIVITIY SCORE: 21

## 2017-11-24 ASSESSMENT — ENCOUNTER SYMPTOMS: FALLS: 1

## 2017-11-24 ASSESSMENT — GAIT ASSESSMENTS
DISTANCE (FEET): 30
ASSISTIVE DEVICE: FRONT WHEEL WALKER
GAIT LEVEL OF ASSIST: STAND BY ASSIST

## 2017-11-24 ASSESSMENT — PAIN SCALES - GENERAL
PAINLEVEL_OUTOF10: 2
PAINLEVEL_OUTOF10: 1
PAINLEVEL_OUTOF10: 7

## 2017-11-24 ASSESSMENT — ACTIVITIES OF DAILY LIVING (ADL): TOILETING: INDEPENDENT
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
## 2017-11-24 NOTE — DIETARY
"Nutrition Services: High BMI    Pt is a 52 y.o. Male with Dx: L knee pain    Ht: 72\", Wt: 145 kg, BMI= 43.35  Pt with BMI >40; formal weight loss counseling not appropriate in acute care setting.    Recommend outpatient weight management after D/c.     "

## 2017-11-24 NOTE — ASSESSMENT & PLAN NOTE
S/p Right Great toe amputation, through MTP joint with Dr Leo on 11/10/2017  Cx with proteus and strep  ID team has recommended IV abx through 12/22/2017  LPS consult  Transitioned to zosyn per ID team  ID team on board  DM control

## 2017-11-24 NOTE — PROGRESS NOTES
Two RN skin check done. Some small scratches noted on left elbow. Right Great toe amputation with incision present.
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
## 2017-11-24 NOTE — THERAPY
"Occupational Therapy Evaluation completed.   Functional Status:  Pt seen today for OT evaluation. Pt requiring SBA for bed mobility. CGA for functional mobility with FWW. Leigh to dress LB. While ambulating pt became very lightheaded needing to sit down. Lightheadedness gone once seated. Pt limited by decreased activity tolerance, weakness, and impaired balance which impacts independence in ALDs and functional mobility.  Plan of Care: Will benefit from Occupational Therapy 3 times per week  Discharge Recommendations:  Equipment: Will Continue to Assess for Equipment Needs. Post-acute therapy- likely  Discharge to home with outpatient or home health for additional skilled therapy services.    See \"Rehab Therapy-Acute\" Patient Summary Report for complete documentation.    "
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
## 2017-11-24 NOTE — ASSESSMENT & PLAN NOTE
"MRI reveals \"Moderate tricompartmental osteoarthritis, most in the lateral compartment. Complex tear of the body and posterior horn medial meniscus. Degenerative tearing of the anterior horn lateral meniscus. Increased signal in the superolateral aspect of Hoffa fat pad , may represent patellar tendon-lateral femoral condyle friction syndrome\"    Orthopedics recommend no surgical intervention  Continue pain control. PT/OT  DVT study negative  Do not recommend opioids  Stop IV toradol. Transition to celecoxib BID.   Continue Pepcid given use of NSAIDs.   Ice treatment  Weight loss, Body mass index is 43.35 kg/m².  "

## 2017-11-24 NOTE — ASSESSMENT & PLAN NOTE
Uncontrolled  Amlodipine 10 mg  Lisinopril 40 mg  Continue HCTZ 25 mg / Carvedilol low dose  Discussed with patient  Advised ambulation  Bed bound status is contributing  Motivated regarding importance of this  Titrate to achieve normotensive control, Aim SBP < 130 with underlying DMII

## 2017-11-24 NOTE — ASSESSMENT & PLAN NOTE
Uncontrolled. Recent A1C of 9.2. No apparent manifestations.   ASA 81  Pravastatin 20 mg   Holding oral hypoglycemic agents during hospital stay.   SSI  Titrate insulin to achieve normoglycemic control

## 2017-11-25 ENCOUNTER — APPOINTMENT (OUTPATIENT)
Dept: ONCOLOGY | Facility: MEDICAL CENTER | Age: 52
End: 2017-11-25
Attending: INTERNAL MEDICINE
Payer: MEDICAID

## 2017-11-25 ENCOUNTER — APPOINTMENT (OUTPATIENT)
Dept: RADIOLOGY | Facility: MEDICAL CENTER | Age: 52
DRG: 563 | End: 2017-11-25
Attending: INTERNAL MEDICINE
Payer: MEDICAID

## 2017-11-25 LAB
ANION GAP SERPL CALC-SCNC: 8 MMOL/L (ref 0–11.9)
BASOPHILS # BLD AUTO: 0.9 % (ref 0–1.8)
BASOPHILS # BLD: 0.07 K/UL (ref 0–0.12)
BUN SERPL-MCNC: 21 MG/DL (ref 8–22)
CALCIUM SERPL-MCNC: 9.2 MG/DL (ref 8.5–10.5)
CHLORIDE SERPL-SCNC: 101 MMOL/L (ref 96–112)
CO2 SERPL-SCNC: 25 MMOL/L (ref 20–33)
CREAT SERPL-MCNC: 0.66 MG/DL (ref 0.5–1.4)
EOSINOPHIL # BLD AUTO: 0.26 K/UL (ref 0–0.51)
EOSINOPHIL NFR BLD: 3.4 % (ref 0–6.9)
ERYTHROCYTE [DISTWIDTH] IN BLOOD BY AUTOMATED COUNT: 41.2 FL (ref 35.9–50)
GFR SERPL CREATININE-BSD FRML MDRD: >60 ML/MIN/1.73 M 2
GLUCOSE BLD-MCNC: 119 MG/DL (ref 65–99)
GLUCOSE BLD-MCNC: 134 MG/DL (ref 65–99)
GLUCOSE BLD-MCNC: 135 MG/DL (ref 65–99)
GLUCOSE BLD-MCNC: 136 MG/DL (ref 65–99)
GLUCOSE SERPL-MCNC: 138 MG/DL (ref 65–99)
HCT VFR BLD AUTO: 46.7 % (ref 42–52)
HGB BLD-MCNC: 15.4 G/DL (ref 14–18)
IMM GRANULOCYTES # BLD AUTO: 0.03 K/UL (ref 0–0.11)
IMM GRANULOCYTES NFR BLD AUTO: 0.4 % (ref 0–0.9)
LYMPHOCYTES # BLD AUTO: 3.36 K/UL (ref 1–4.8)
LYMPHOCYTES NFR BLD: 44.2 % (ref 22–41)
MCH RBC QN AUTO: 27.6 PG (ref 27–33)
MCHC RBC AUTO-ENTMCNC: 33 G/DL (ref 33.7–35.3)
MCV RBC AUTO: 83.8 FL (ref 81.4–97.8)
MONOCYTES # BLD AUTO: 0.63 K/UL (ref 0–0.85)
MONOCYTES NFR BLD AUTO: 8.3 % (ref 0–13.4)
NEUTROPHILS # BLD AUTO: 3.26 K/UL (ref 1.82–7.42)
NEUTROPHILS NFR BLD: 42.8 % (ref 44–72)
NRBC # BLD AUTO: 0 K/UL
NRBC BLD AUTO-RTO: 0 /100 WBC
PLATELET # BLD AUTO: 307 K/UL (ref 164–446)
PMV BLD AUTO: 10.8 FL (ref 9–12.9)
POTASSIUM SERPL-SCNC: 3.7 MMOL/L (ref 3.6–5.5)
RBC # BLD AUTO: 5.57 M/UL (ref 4.7–6.1)
SODIUM SERPL-SCNC: 134 MMOL/L (ref 135–145)
WBC # BLD AUTO: 7.6 K/UL (ref 4.8–10.8)

## 2017-11-25 PROCEDURE — 80048 BASIC METABOLIC PNL TOTAL CA: CPT

## 2017-11-25 PROCEDURE — A9270 NON-COVERED ITEM OR SERVICE: HCPCS | Performed by: INTERNAL MEDICINE

## 2017-11-25 PROCEDURE — 700111 HCHG RX REV CODE 636 W/ 250 OVERRIDE (IP): Performed by: INTERNAL MEDICINE

## 2017-11-25 PROCEDURE — 700117 HCHG RX CONTRAST REV CODE 255: Performed by: INTERNAL MEDICINE

## 2017-11-25 PROCEDURE — 73723 MRI JOINT LWR EXTR W/O&W/DYE: CPT | Mod: LT

## 2017-11-25 PROCEDURE — 85025 COMPLETE CBC W/AUTO DIFF WBC: CPT

## 2017-11-25 PROCEDURE — A9579 GAD-BASE MR CONTRAST NOS,1ML: HCPCS | Performed by: INTERNAL MEDICINE

## 2017-11-25 PROCEDURE — 99225 PR SUBSEQUENT OBSERVATION CARE,LEVEL II: CPT | Performed by: INTERNAL MEDICINE

## 2017-11-25 PROCEDURE — G0378 HOSPITAL OBSERVATION PER HR: HCPCS

## 2017-11-25 PROCEDURE — 82962 GLUCOSE BLOOD TEST: CPT

## 2017-11-25 PROCEDURE — 700102 HCHG RX REV CODE 250 W/ 637 OVERRIDE(OP): Performed by: INTERNAL MEDICINE

## 2017-11-25 RX ADMIN — OXYCODONE HYDROCHLORIDE 5 MG: 5 TABLET ORAL at 02:07

## 2017-11-25 RX ADMIN — ACETAMINOPHEN 500 MG: 500 TABLET ORAL at 22:49

## 2017-11-25 RX ADMIN — CEFAZOLIN SODIUM 2 G: 2 INJECTION, SOLUTION INTRAVENOUS at 20:20

## 2017-11-25 RX ADMIN — OXYCODONE HYDROCHLORIDE 5 MG: 5 TABLET ORAL at 12:15

## 2017-11-25 RX ADMIN — HEPARIN SODIUM 5000 UNITS: 5000 INJECTION, SOLUTION INTRAVENOUS; SUBCUTANEOUS at 20:19

## 2017-11-25 RX ADMIN — OXYCODONE HYDROCHLORIDE 5 MG: 5 TABLET ORAL at 16:49

## 2017-11-25 RX ADMIN — CEFAZOLIN SODIUM 2 G: 2 INJECTION, SOLUTION INTRAVENOUS at 05:52

## 2017-11-25 RX ADMIN — HEPARIN SODIUM 5000 UNITS: 5000 INJECTION, SOLUTION INTRAVENOUS; SUBCUTANEOUS at 05:52

## 2017-11-25 RX ADMIN — ACETAMINOPHEN 500 MG: 500 TABLET ORAL at 11:29

## 2017-11-25 RX ADMIN — HEPARIN SODIUM 5000 UNITS: 5000 INJECTION, SOLUTION INTRAVENOUS; SUBCUTANEOUS at 13:45

## 2017-11-25 RX ADMIN — ACETAMINOPHEN 500 MG: 500 TABLET ORAL at 16:49

## 2017-11-25 RX ADMIN — AMLODIPINE BESYLATE 5 MG: 5 TABLET ORAL at 16:49

## 2017-11-25 RX ADMIN — OXYCODONE HYDROCHLORIDE 5 MG: 5 TABLET ORAL at 08:10

## 2017-11-25 RX ADMIN — LISINOPRIL 10 MG: 10 TABLET ORAL at 16:49

## 2017-11-25 RX ADMIN — METRONIDAZOLE 500 MG: 500 TABLET ORAL at 13:45

## 2017-11-25 RX ADMIN — METRONIDAZOLE 500 MG: 500 TABLET ORAL at 20:20

## 2017-11-25 RX ADMIN — METRONIDAZOLE 500 MG: 500 TABLET ORAL at 05:51

## 2017-11-25 RX ADMIN — CEFAZOLIN SODIUM 2 G: 2 INJECTION, SOLUTION INTRAVENOUS at 13:45

## 2017-11-25 RX ADMIN — ACETAMINOPHEN 500 MG: 500 TABLET ORAL at 05:52

## 2017-11-25 RX ADMIN — GADODIAMIDE 20 ML: 287 INJECTION INTRAVENOUS at 18:06

## 2017-11-25 ASSESSMENT — PAIN SCALES - GENERAL
PAINLEVEL_OUTOF10: 4
PAINLEVEL_OUTOF10: 5

## 2017-11-25 ASSESSMENT — ENCOUNTER SYMPTOMS: FALLS: 1
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
## 2017-11-25 NOTE — CARE PLAN
Problem: Safety  Goal: Will remain free from injury  Outcome: PROGRESSING AS EXPECTED  Call light within reach, pt calls for assistance at all times. Bed in low position and locked, upper bedside rails up, proper mobility signs placed, personal possessions within reach, treaded socks on. Hourly rounding in place.        Problem: Venous Thromboembolism (VTW)/Deep Vein Thrombosis (DVT) Prevention:  Goal: Patient will participate in Venous Thrombosis (VTE)/Deep Vein Thrombosis (DVT)Prevention Measures  Outcome: PROGRESSING AS EXPECTED  Heparin given per MAR for DVT prophylaxis.      Problem: Pain Management  Goal: Pain level will decrease to patient's comfort goal  Outcome: PROGRESSING AS EXPECTED  Pain medication given PRN per MAR for adequate pain relief.

## 2017-11-25 NOTE — PROGRESS NOTES
Bedside report received by ELISA Mcfarlane and care assumed for patient at 1900. Pt A&O X 4,  On room air. VSS.  Voiding via urinal at bedside. CMS +, c/o numbness to L knee. Pt calls appropriately.   Pt sitting up in bed watching tv. Plan of care discussed including pain management, mobilization, MRI tomorrow most likely, lab draws in early AM. All questions and needs addressed at this time.  Hourly rounding in place.  Call light and belongings at bedside and within reach.  Bed in lowest position.

## 2017-11-26 ENCOUNTER — APPOINTMENT (OUTPATIENT)
Dept: ONCOLOGY | Facility: MEDICAL CENTER | Age: 52
End: 2017-11-26
Attending: INTERNAL MEDICINE
Payer: MEDICAID

## 2017-11-26 LAB
ANION GAP SERPL CALC-SCNC: 8 MMOL/L (ref 0–11.9)
BASOPHILS # BLD AUTO: 1.1 % (ref 0–1.8)
BASOPHILS # BLD: 0.08 K/UL (ref 0–0.12)
BUN SERPL-MCNC: 24 MG/DL (ref 8–22)
CALCIUM SERPL-MCNC: 9 MG/DL (ref 8.5–10.5)
CHLORIDE SERPL-SCNC: 102 MMOL/L (ref 96–112)
CO2 SERPL-SCNC: 25 MMOL/L (ref 20–33)
CREAT SERPL-MCNC: 0.57 MG/DL (ref 0.5–1.4)
EOSINOPHIL # BLD AUTO: 0.29 K/UL (ref 0–0.51)
EOSINOPHIL NFR BLD: 4.2 % (ref 0–6.9)
ERYTHROCYTE [DISTWIDTH] IN BLOOD BY AUTOMATED COUNT: 42.6 FL (ref 35.9–50)
GFR SERPL CREATININE-BSD FRML MDRD: >60 ML/MIN/1.73 M 2
GLUCOSE BLD-MCNC: 124 MG/DL (ref 65–99)
GLUCOSE BLD-MCNC: 124 MG/DL (ref 65–99)
GLUCOSE BLD-MCNC: 126 MG/DL (ref 65–99)
GLUCOSE BLD-MCNC: 151 MG/DL (ref 65–99)
GLUCOSE SERPL-MCNC: 129 MG/DL (ref 65–99)
HCT VFR BLD AUTO: 43.1 % (ref 42–52)
HGB BLD-MCNC: 14.1 G/DL (ref 14–18)
IMM GRANULOCYTES # BLD AUTO: 0.02 K/UL (ref 0–0.11)
IMM GRANULOCYTES NFR BLD AUTO: 0.3 % (ref 0–0.9)
LYMPHOCYTES # BLD AUTO: 3.03 K/UL (ref 1–4.8)
LYMPHOCYTES NFR BLD: 43.4 % (ref 22–41)
MCH RBC QN AUTO: 27.5 PG (ref 27–33)
MCHC RBC AUTO-ENTMCNC: 32.7 G/DL (ref 33.7–35.3)
MCV RBC AUTO: 84 FL (ref 81.4–97.8)
MONOCYTES # BLD AUTO: 0.53 K/UL (ref 0–0.85)
MONOCYTES NFR BLD AUTO: 7.6 % (ref 0–13.4)
NEUTROPHILS # BLD AUTO: 3.03 K/UL (ref 1.82–7.42)
NEUTROPHILS NFR BLD: 43.4 % (ref 44–72)
NRBC # BLD AUTO: 0 K/UL
NRBC BLD AUTO-RTO: 0 /100 WBC
PLATELET # BLD AUTO: 278 K/UL (ref 164–446)
PMV BLD AUTO: 10.5 FL (ref 9–12.9)
POTASSIUM SERPL-SCNC: 3.6 MMOL/L (ref 3.6–5.5)
RBC # BLD AUTO: 5.13 M/UL (ref 4.7–6.1)
SODIUM SERPL-SCNC: 135 MMOL/L (ref 135–145)
WBC # BLD AUTO: 7 K/UL (ref 4.8–10.8)

## 2017-11-26 PROCEDURE — 700111 HCHG RX REV CODE 636 W/ 250 OVERRIDE (IP): Performed by: INTERNAL MEDICINE

## 2017-11-26 PROCEDURE — 99225 PR SUBSEQUENT OBSERVATION CARE,LEVEL II: CPT | Performed by: INTERNAL MEDICINE

## 2017-11-26 PROCEDURE — A9270 NON-COVERED ITEM OR SERVICE: HCPCS | Performed by: INTERNAL MEDICINE

## 2017-11-26 PROCEDURE — 80048 BASIC METABOLIC PNL TOTAL CA: CPT

## 2017-11-26 PROCEDURE — 85025 COMPLETE CBC W/AUTO DIFF WBC: CPT

## 2017-11-26 PROCEDURE — 82962 GLUCOSE BLOOD TEST: CPT | Mod: 91

## 2017-11-26 PROCEDURE — G0378 HOSPITAL OBSERVATION PER HR: HCPCS

## 2017-11-26 PROCEDURE — 700102 HCHG RX REV CODE 250 W/ 637 OVERRIDE(OP): Performed by: INTERNAL MEDICINE

## 2017-11-26 RX ORDER — POTASSIUM CHLORIDE 20 MEQ/1
40 TABLET, EXTENDED RELEASE ORAL ONCE
Status: COMPLETED | OUTPATIENT
Start: 2017-11-26 | End: 2017-11-26

## 2017-11-26 RX ADMIN — METRONIDAZOLE 500 MG: 500 TABLET ORAL at 21:23

## 2017-11-26 RX ADMIN — OXYCODONE HYDROCHLORIDE 5 MG: 5 TABLET ORAL at 23:25

## 2017-11-26 RX ADMIN — CEFAZOLIN SODIUM 2 G: 2 INJECTION, SOLUTION INTRAVENOUS at 05:30

## 2017-11-26 RX ADMIN — HEPARIN SODIUM 5000 UNITS: 5000 INJECTION, SOLUTION INTRAVENOUS; SUBCUTANEOUS at 05:29

## 2017-11-26 RX ADMIN — CEFAZOLIN SODIUM 2 G: 2 INJECTION, SOLUTION INTRAVENOUS at 21:23

## 2017-11-26 RX ADMIN — OXYCODONE HYDROCHLORIDE 5 MG: 5 TABLET ORAL at 08:11

## 2017-11-26 RX ADMIN — OXYCODONE HYDROCHLORIDE 5 MG: 5 TABLET ORAL at 02:10

## 2017-11-26 RX ADMIN — ACETAMINOPHEN 500 MG: 500 TABLET ORAL at 23:25

## 2017-11-26 RX ADMIN — ACETAMINOPHEN 500 MG: 500 TABLET ORAL at 18:00

## 2017-11-26 RX ADMIN — CEFAZOLIN SODIUM 2 G: 2 INJECTION, SOLUTION INTRAVENOUS at 13:03

## 2017-11-26 RX ADMIN — AMLODIPINE BESYLATE 5 MG: 5 TABLET ORAL at 16:28

## 2017-11-26 RX ADMIN — METRONIDAZOLE 500 MG: 500 TABLET ORAL at 13:03

## 2017-11-26 RX ADMIN — ACETAMINOPHEN 500 MG: 500 TABLET ORAL at 11:28

## 2017-11-26 RX ADMIN — LISINOPRIL 10 MG: 10 TABLET ORAL at 16:28

## 2017-11-26 RX ADMIN — OXYCODONE HYDROCHLORIDE 5 MG: 5 TABLET ORAL at 13:03

## 2017-11-26 RX ADMIN — INSULIN HUMAN 1 UNITS: 100 INJECTION, SOLUTION PARENTERAL at 16:31

## 2017-11-26 RX ADMIN — METRONIDAZOLE 500 MG: 500 TABLET ORAL at 05:29

## 2017-11-26 RX ADMIN — HEPARIN SODIUM 5000 UNITS: 5000 INJECTION, SOLUTION INTRAVENOUS; SUBCUTANEOUS at 21:23

## 2017-11-26 RX ADMIN — ACETAMINOPHEN 500 MG: 500 TABLET ORAL at 05:29

## 2017-11-26 RX ADMIN — HEPARIN SODIUM 5000 UNITS: 5000 INJECTION, SOLUTION INTRAVENOUS; SUBCUTANEOUS at 13:03

## 2017-11-26 RX ADMIN — POTASSIUM CHLORIDE 40 MEQ: 1500 TABLET, EXTENDED RELEASE ORAL at 11:32

## 2017-11-26 ASSESSMENT — PAIN SCALES - GENERAL
PAINLEVEL_OUTOF10: 3
PAINLEVEL_OUTOF10: 8
PAINLEVEL_OUTOF10: 6
PAINLEVEL_OUTOF10: 7
PAINLEVEL_OUTOF10: 8
PAINLEVEL_OUTOF10: 3
PAINLEVEL_OUTOF10: 4

## 2017-11-26 ASSESSMENT — ENCOUNTER SYMPTOMS: FALLS: 1

## 2017-11-26 NOTE — CARE PLAN
Problem: Safety  Goal: Will remain free from injury  Outcome: PROGRESSING AS EXPECTED  Call light within reach, pt calls for assistance at all times.  Bed in low position and locked, upper bedside rails up, proper mobility signs placed, personal possessions within reach, treaded socks on. Hourly rounding in place.          Problem: Pain Management  Goal: Pain level will decrease to patient's comfort goal  Outcome: PROGRESSING AS EXPECTED  Pt states pain is being well controlled with PRN oxycodone when needed.
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
## 2017-11-26 NOTE — PROGRESS NOTES
Bedside report received by ELISA Blunt and care assumed for patient at 1900. Pt A&O X 4,  On room air. VSS.  Voiding via urinal. CMS +, numbness to L knee.  Pt calls appropriately. Pt laying down in bed resting, appears calm and comfortable. Plan of care discussed including pain management, mobilization.  All questions and needs addressed at this time. Hourly rounding in place.  Call light and belongings at bedside and within reach.  Bed in lowest position.

## 2017-11-26 NOTE — FACE TO FACE
Face to Face Supporting Documentation - Home Health    The encounter with this patient was in whole or in part the primary reason for home health admission.    Date of encounter:   Patient:                    MRN:                       YOB: 2019  Sebastián Castro  [id number]  1965     Home health to see patient for:  Skilled Nursing care for assessment, interventions & education, Physical Therapy evaluation and treatment and Occupational therapy evaluation and treatment    Skilled need for:  Comment: Left knee pain, difficulty ambulating    Skilled nursing interventions to include:  Comment: PT OT    Homebound status evidenced by:  Need the aid of supportive devices such as crutches, canes, wheelchairs or walkers. Leaving home requires a considerable and taxing effort. There is a normal inability to leave the home.    Community Physician to provide follow up care: Pcp Pt States None     Optional Interventions? No      I certify the face to face encounter for this home health care referral meets the CMS requirements and the encounter/clinical assessment with the patient was, in whole, or in part, for the medical condition(s) listed above, which is the primary reason for home health care. Based on my clinical findings: the service(s) are medically necessary, support the need for home health care, and the homebound criteria are met.  I certify that this patient has had a face to face encounter by myself.  Bob Navas M.D. - NPI: [id number]

## 2017-11-27 ENCOUNTER — OUTPATIENT INFUSION SERVICES (OUTPATIENT)
Dept: ONCOLOGY | Facility: MEDICAL CENTER | Age: 52
End: 2017-11-27
Attending: INTERNAL MEDICINE
Payer: MEDICAID

## 2017-11-27 DIAGNOSIS — M86.079 ACUTE HEMATOGENOUS OSTEOMYELITIS, UNSPECIFIED ANKLE AND FOOT (HCC): ICD-10-CM

## 2017-11-27 LAB
ANION GAP SERPL CALC-SCNC: 7 MMOL/L (ref 0–11.9)
BASOPHILS # BLD AUTO: 0.9 % (ref 0–1.8)
BASOPHILS # BLD: 0.06 K/UL (ref 0–0.12)
BUN SERPL-MCNC: 22 MG/DL (ref 8–22)
CALCIUM SERPL-MCNC: 8.7 MG/DL (ref 8.5–10.5)
CHLORIDE SERPL-SCNC: 102 MMOL/L (ref 96–112)
CO2 SERPL-SCNC: 26 MMOL/L (ref 20–33)
CREAT SERPL-MCNC: 0.49 MG/DL (ref 0.5–1.4)
EOSINOPHIL # BLD AUTO: 0.33 K/UL (ref 0–0.51)
EOSINOPHIL NFR BLD: 4.9 % (ref 0–6.9)
ERYTHROCYTE [DISTWIDTH] IN BLOOD BY AUTOMATED COUNT: 41.3 FL (ref 35.9–50)
GFR SERPL CREATININE-BSD FRML MDRD: >60 ML/MIN/1.73 M 2
GLUCOSE BLD-MCNC: 127 MG/DL (ref 65–99)
GLUCOSE BLD-MCNC: 131 MG/DL (ref 65–99)
GLUCOSE BLD-MCNC: 137 MG/DL (ref 65–99)
GLUCOSE BLD-MCNC: 221 MG/DL (ref 65–99)
GLUCOSE SERPL-MCNC: 132 MG/DL (ref 65–99)
HCT VFR BLD AUTO: 42 % (ref 42–52)
HGB BLD-MCNC: 13.8 G/DL (ref 14–18)
IMM GRANULOCYTES # BLD AUTO: 0.02 K/UL (ref 0–0.11)
IMM GRANULOCYTES NFR BLD AUTO: 0.3 % (ref 0–0.9)
LYMPHOCYTES # BLD AUTO: 2.64 K/UL (ref 1–4.8)
LYMPHOCYTES NFR BLD: 39.4 % (ref 22–41)
MCH RBC QN AUTO: 27.3 PG (ref 27–33)
MCHC RBC AUTO-ENTMCNC: 32.9 G/DL (ref 33.7–35.3)
MCV RBC AUTO: 83 FL (ref 81.4–97.8)
MONOCYTES # BLD AUTO: 0.43 K/UL (ref 0–0.85)
MONOCYTES NFR BLD AUTO: 6.4 % (ref 0–13.4)
NEUTROPHILS # BLD AUTO: 3.22 K/UL (ref 1.82–7.42)
NEUTROPHILS NFR BLD: 48.1 % (ref 44–72)
NRBC # BLD AUTO: 0 K/UL
NRBC BLD AUTO-RTO: 0 /100 WBC
PLATELET # BLD AUTO: 245 K/UL (ref 164–446)
PMV BLD AUTO: 10.6 FL (ref 9–12.9)
POTASSIUM SERPL-SCNC: 3.8 MMOL/L (ref 3.6–5.5)
RBC # BLD AUTO: 5.06 M/UL (ref 4.7–6.1)
SODIUM SERPL-SCNC: 135 MMOL/L (ref 135–145)
WBC # BLD AUTO: 6.7 K/UL (ref 4.8–10.8)

## 2017-11-27 PROCEDURE — G0378 HOSPITAL OBSERVATION PER HR: HCPCS

## 2017-11-27 PROCEDURE — 97116 GAIT TRAINING THERAPY: CPT

## 2017-11-27 PROCEDURE — 700111 HCHG RX REV CODE 636 W/ 250 OVERRIDE (IP): Performed by: INTERNAL MEDICINE

## 2017-11-27 PROCEDURE — 700102 HCHG RX REV CODE 250 W/ 637 OVERRIDE(OP): Performed by: INTERNAL MEDICINE

## 2017-11-27 PROCEDURE — 85025 COMPLETE CBC W/AUTO DIFF WBC: CPT

## 2017-11-27 PROCEDURE — A9270 NON-COVERED ITEM OR SERVICE: HCPCS | Performed by: INTERNAL MEDICINE

## 2017-11-27 PROCEDURE — 700105 HCHG RX REV CODE 258

## 2017-11-27 PROCEDURE — 99225 PR SUBSEQUENT OBSERVATION CARE,LEVEL II: CPT | Performed by: INTERNAL MEDICINE

## 2017-11-27 PROCEDURE — 80048 BASIC METABOLIC PNL TOTAL CA: CPT

## 2017-11-27 PROCEDURE — 97110 THERAPEUTIC EXERCISES: CPT

## 2017-11-27 PROCEDURE — 82962 GLUCOSE BLOOD TEST: CPT

## 2017-11-27 RX ORDER — SODIUM CHLORIDE 9 MG/ML
INJECTION, SOLUTION INTRAVENOUS
Status: COMPLETED
Start: 2017-11-27 | End: 2017-11-27

## 2017-11-27 RX ADMIN — ACETAMINOPHEN 500 MG: 500 TABLET ORAL at 12:00

## 2017-11-27 RX ADMIN — METRONIDAZOLE 500 MG: 500 TABLET ORAL at 05:06

## 2017-11-27 RX ADMIN — ACETAMINOPHEN 500 MG: 500 TABLET ORAL at 17:53

## 2017-11-27 RX ADMIN — OXYCODONE HYDROCHLORIDE 5 MG: 5 TABLET ORAL at 08:18

## 2017-11-27 RX ADMIN — OXYCODONE HYDROCHLORIDE 5 MG: 5 TABLET ORAL at 13:53

## 2017-11-27 RX ADMIN — HEPARIN SODIUM 5000 UNITS: 5000 INJECTION, SOLUTION INTRAVENOUS; SUBCUTANEOUS at 13:15

## 2017-11-27 RX ADMIN — OXYCODONE HYDROCHLORIDE 5 MG: 5 TABLET ORAL at 20:05

## 2017-11-27 RX ADMIN — LISINOPRIL 10 MG: 10 TABLET ORAL at 16:24

## 2017-11-27 RX ADMIN — ONDANSETRON 4 MG: 2 INJECTION INTRAMUSCULAR; INTRAVENOUS at 22:01

## 2017-11-27 RX ADMIN — AMLODIPINE BESYLATE 5 MG: 5 TABLET ORAL at 16:24

## 2017-11-27 RX ADMIN — CEFAZOLIN SODIUM 2 G: 2 INJECTION, SOLUTION INTRAVENOUS at 13:15

## 2017-11-27 RX ADMIN — ONDANSETRON 4 MG: 2 INJECTION INTRAMUSCULAR; INTRAVENOUS at 05:12

## 2017-11-27 RX ADMIN — ACETAMINOPHEN 500 MG: 500 TABLET ORAL at 05:06

## 2017-11-27 RX ADMIN — CEFAZOLIN SODIUM 2 G: 2 INJECTION, SOLUTION INTRAVENOUS at 05:06

## 2017-11-27 RX ADMIN — INSULIN HUMAN 2 UNITS: 100 INJECTION, SOLUTION PARENTERAL at 16:40

## 2017-11-27 RX ADMIN — METRONIDAZOLE 500 MG: 500 TABLET ORAL at 21:58

## 2017-11-27 RX ADMIN — METRONIDAZOLE 500 MG: 500 TABLET ORAL at 13:15

## 2017-11-27 RX ADMIN — CEFAZOLIN SODIUM 2 G: 2 INJECTION, SOLUTION INTRAVENOUS at 21:58

## 2017-11-27 RX ADMIN — HEPARIN SODIUM 5000 UNITS: 5000 INJECTION, SOLUTION INTRAVENOUS; SUBCUTANEOUS at 21:58

## 2017-11-27 RX ADMIN — SODIUM CHLORIDE 1000 ML: 9 INJECTION, SOLUTION INTRAVENOUS at 20:05

## 2017-11-27 RX ADMIN — OXYCODONE HYDROCHLORIDE 5 MG: 5 TABLET ORAL at 04:07

## 2017-11-27 RX ADMIN — HEPARIN SODIUM 5000 UNITS: 5000 INJECTION, SOLUTION INTRAVENOUS; SUBCUTANEOUS at 05:06

## 2017-11-27 ASSESSMENT — GAIT ASSESSMENTS
ASSISTIVE DEVICE: FRONT WHEEL WALKER
DEVIATION: ANTALGIC;BRADYKINETIC;DECREASED HEEL STRIKE;DECREASED TOE OFF
GAIT LEVEL OF ASSIST: STAND BY ASSIST
DISTANCE (FEET): 50

## 2017-11-27 ASSESSMENT — COGNITIVE AND FUNCTIONAL STATUS - GENERAL
CLIMB 3 TO 5 STEPS WITH RAILING: A LOT
MOBILITY SCORE: 17
STANDING UP FROM CHAIR USING ARMS: A LITTLE
SUGGESTED CMS G CODE MODIFIER MOBILITY: CK
MOVING TO AND FROM BED TO CHAIR: A LITTLE
TURNING FROM BACK TO SIDE WHILE IN FLAT BAD: A LITTLE
MOVING FROM LYING ON BACK TO SITTING ON SIDE OF FLAT BED: A LITTLE
WALKING IN HOSPITAL ROOM: A LITTLE

## 2017-11-27 ASSESSMENT — PAIN SCALES - GENERAL
PAINLEVEL_OUTOF10: 2
PAINLEVEL_OUTOF10: 4
PAINLEVEL_OUTOF10: 5
PAINLEVEL_OUTOF10: 10
PAINLEVEL_OUTOF10: 8
PAINLEVEL_OUTOF10: 8
PAINLEVEL_OUTOF10: 6

## 2017-11-27 ASSESSMENT — ENCOUNTER SYMPTOMS: FALLS: 1

## 2017-11-27 NOTE — PROGRESS NOTES
Pt A&O x 4. PRN pain medication given per MAR. Pt on RA with no shortness of breath. LUE PICC flushed with good blood return; CHG bath given by CNA. RLE great tow amputation dressing DCI; LLE dressing in place on knee. Pt uses call light appropriately. Personal belongings and call light within reach. Discussed POC, safety, and mobility; pt has no questions at this time. Bed in lowest position with treaded socks on pt. Hourly rounding in place.
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
## 2017-11-27 NOTE — DISCHARGE PLANNING
TCN met with patient to discuss his transitional care options. Patient currently is uninsured and is at risk for high readmission. Per discussion with SW will attempt for SNF if PATRICIO. TCN discussed option with MD who is also in agreement and will place SNF order. Per discussion with patient he is agreeable to SNF with and PATRICIO, choice to be faxed to all local facilities. If unable to go to SNF with PATRICIO patient agreeable to  with [facility]. SW aware of discussion. TCN to follow as needed.
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
## 2017-11-27 NOTE — DISCHARGE PLANNING
Care Transition Team Assessment    Information Source  Orientation : Oriented x 4  Information Given By: Patient  Who is responsible for making decisions for patient? : Patient    Readmission Evaluation  Is this a readmission?: Yes - unplanned readmission  Was an appointment arranged for you prior to discharge?: Yes, attended appointment    Elopement Risk  Legal Hold: No  Ambulatory or Self Mobile in Wheelchair: Yes  Disoriented: No  Psychiatric Symptoms: None  History of Wandering: No  Elopement this Admit: No  Vocalizing Wanting to Leave: No  Displays Behaviors, Body Language Wanting to Leave: No-Not at Risk for Elopement  Elopement Risk: Not at Risk for Elopement    Interdisciplinary Discharge Planning  Does Admitting Nurse Feel This Could be a Complex Discharge?: Yes  Primary Care Physician: non  Lives with - Patient's Self Care Capacity: Alone and Able to Care For Self  Support Systems: Friends / Neighbors  Housing / Facility: [address]  Do You Take your Prescribed Medications Regularly: Yes  Able to Return to Previous ADL's: Yes  Mobility Issues: Yes  Prior Services: None  Patient Expects to be Discharged to:: shyam  Assistance Needed: No  Durable Medical Equipment: Walker    Discharge Preparedness  What is your plan after discharge?: Skilled nursing facility  What are your discharge supports?: Child  Prior Functional Level: Ambulatory, Uses Walker    Functional Assesment  Prior Functional Level: Ambulatory, Uses Walker    Finances  Financial Barriers to Discharge: Yes  Source of Income: None  Prescription Coverage: No    Vision / Hearing Impairment  Vision Impairment : No  Hearing Impairment : No    Values / Beliefs / Concerns  Values / Beliefs Concerns : No  Special Hospitalization Concerns: non    Domestic Abuse  Have you ever been the victim of abuse or violence?: No  Physical Abuse or Sexual Abuse: No  Verbal Abuse or Emotional Abuse: No    Discharge Risks or Barriers  Discharge risks or barriers?:  Uninsured / underinsured    Anticipated Discharge Information  Anticipated discharge disposition: SNF

## 2017-11-27 NOTE — DISCHARGE PLANNING
CCS received a SNF choice form. Per the choice form the referral has been sent to all local SNF's

## 2017-11-27 NOTE — DISCHARGE PLANNING
CCS received notification from the following facilities. [facility] skilled denied. No Beds and [facility] denies  No Open beds,

## 2017-11-27 NOTE — WOUND TEAM
LPS consult made as they were managing incisional wound care prior to discharge recently.  No involvement by wound team at this time.

## 2017-11-27 NOTE — CONSULTS
DATE OF SERVICE:  11/26/2017    I was asked to see the patient by the medical service taking care of him.    HISTORY OF PRESENT ILLNESS:  The patient is a very pleasant 52-year-old   gentleman who sustained a fall onto his knee several days ago.  He reports   isolated pain to the left knee.  He has been really unable to ambulate since   then.  He offers no other unusual complaints.    PHYSICAL EXAMINATION:  He has normal neurovascular examination.  Tracy is   negative, anterior drawer negative, Lachman negative, stable to varus and   valgus stress.  He has some pain with axial load and rotation.    IMAGING STUDIES:  I reviewed the images.  X-rays demonstrate no gross   abnormalities in the knee aside from some moderate arthritis.  I reviewed the   MRI which demonstrates a complex meniscus tear, this is of the medial   meniscus.  He also has degenerative tear of the anterior horn of the lateral   meniscus.  All other ligaments are grossly intact.    IMPRESSION:  Left knee pain after trauma.    ASSESSMENT AND PLAN:  None of the aforementioned diagnoses are operative.  I   do not think the patient has any sort of infection going on.  I think the best   thing for this patient is going to be weight loss as his weight is 319   pounds.  Anti-inflammatories, ice, physical therapy and followup as needed.    We applied a 6-inch Ace wrap over the knee to help with any sort of swelling   and provide some relief there.  As far as outpatient followup goes, the   patient can see Dr. Delaney Poe.  This patient grossly meets no surgical   criteria and she may be able to manage him nonoperatively with regard to   physical therapy and medication.    Thank you kindly for this consult.  I will follow up from the floor.  Please   do not hesitate to contact me directly regarding any questions about this   patient, [phone] is my cell phone.  Thank you again for this   consultation.       ____________________________________      MD WINIFRED Prescott / NOEMY    DD:  11/26/2017 16:29:06  DT:  11/26/2017 17:38:43    D#:  [id number]  Job#:  [id number]

## 2017-11-27 NOTE — DISCHARGE PLANNING
CCS received a message from Jason at [facility]. The referral is been reviewed. Jason is requesting for the CESAR zaina and all information that was turned in the patient's ZAINA.

## 2017-11-27 NOTE — THERAPY
"Pt agreeable to tx today. Pt presents w/L knee pain, causing impaired functional mobility. Pt instructed on knee therexs to strenghten knee, to prevent buckling. Pt able to perform supine and seated LE therexs and tolerated well. Pt knee flexion is limited (presented by limited supine heel-slides). Pt able to get OOB w/o physical assist. Pt able to amb further today. Pt demonstrated decreased heel-toe off and decreased knee extension of B LEs, due to weakness of LEs. Pt fatigues easily, which limits amb distance. Pt able to transfer to bedside chair @ SBA. Pt educated about importance of increasing OOB and amb, as well as HEP. Pt receptive. Pt would benefit from further acute PT txs to progress towards goals and independence.    Physical Therapy Treatment completed.   Bed Mobility:  Supine to Sit: Supervised  Transfers: Sit to Stand: Supervised  Gait: Level Of Assist: Stand by Assist with Front-Wheel Walker       Plan of Care: Will benefit from Physical Therapy 5 times per week      See \"Rehab Therapy-Acute\" Patient Summary Report for complete documentation.       "

## 2017-11-28 ENCOUNTER — OUTPATIENT INFUSION SERVICES (OUTPATIENT)
Dept: ONCOLOGY | Facility: MEDICAL CENTER | Age: 52
End: 2017-11-28
Attending: INTERNAL MEDICINE
Payer: MEDICAID

## 2017-11-28 PROBLEM — E66.9 OBESITY: Status: ACTIVE | Noted: 2017-11-11

## 2017-11-28 PROBLEM — E87.1 HYPONATREMIA: Status: RESOLVED | Noted: 2017-11-11 | Resolved: 2017-11-28

## 2017-11-28 LAB
ANION GAP SERPL CALC-SCNC: 7 MMOL/L (ref 0–11.9)
BASOPHILS # BLD AUTO: 0.9 % (ref 0–1.8)
BASOPHILS # BLD: 0.05 K/UL (ref 0–0.12)
BUN SERPL-MCNC: 15 MG/DL (ref 8–22)
CALCIUM SERPL-MCNC: 8.9 MG/DL (ref 8.5–10.5)
CHLORIDE SERPL-SCNC: 99 MMOL/L (ref 96–112)
CO2 SERPL-SCNC: 27 MMOL/L (ref 20–33)
CREAT SERPL-MCNC: 0.46 MG/DL (ref 0.5–1.4)
EOSINOPHIL # BLD AUTO: 0.22 K/UL (ref 0–0.51)
EOSINOPHIL NFR BLD: 3.8 % (ref 0–6.9)
ERYTHROCYTE [DISTWIDTH] IN BLOOD BY AUTOMATED COUNT: 41.1 FL (ref 35.9–50)
GFR SERPL CREATININE-BSD FRML MDRD: >60 ML/MIN/1.73 M 2
GLUCOSE BLD-MCNC: 121 MG/DL (ref 65–99)
GLUCOSE BLD-MCNC: 137 MG/DL (ref 65–99)
GLUCOSE BLD-MCNC: 142 MG/DL (ref 65–99)
GLUCOSE BLD-MCNC: 147 MG/DL (ref 65–99)
GLUCOSE SERPL-MCNC: 146 MG/DL (ref 65–99)
HCT VFR BLD AUTO: 43.3 % (ref 42–52)
HGB BLD-MCNC: 14.3 G/DL (ref 14–18)
IMM GRANULOCYTES # BLD AUTO: 0.02 K/UL (ref 0–0.11)
IMM GRANULOCYTES NFR BLD AUTO: 0.3 % (ref 0–0.9)
LYMPHOCYTES # BLD AUTO: 1.53 K/UL (ref 1–4.8)
LYMPHOCYTES NFR BLD: 26.7 % (ref 22–41)
MCH RBC QN AUTO: 27.7 PG (ref 27–33)
MCHC RBC AUTO-ENTMCNC: 33 G/DL (ref 33.7–35.3)
MCV RBC AUTO: 83.8 FL (ref 81.4–97.8)
MONOCYTES # BLD AUTO: 0.3 K/UL (ref 0–0.85)
MONOCYTES NFR BLD AUTO: 5.2 % (ref 0–13.4)
NEUTROPHILS # BLD AUTO: 3.62 K/UL (ref 1.82–7.42)
NEUTROPHILS NFR BLD: 63.1 % (ref 44–72)
NRBC # BLD AUTO: 0 K/UL
NRBC BLD AUTO-RTO: 0 /100 WBC
PLATELET # BLD AUTO: 215 K/UL (ref 164–446)
PMV BLD AUTO: 10.7 FL (ref 9–12.9)
POTASSIUM SERPL-SCNC: 3.8 MMOL/L (ref 3.6–5.5)
RBC # BLD AUTO: 5.17 M/UL (ref 4.7–6.1)
SODIUM SERPL-SCNC: 133 MMOL/L (ref 135–145)
WBC # BLD AUTO: 5.7 K/UL (ref 4.8–10.8)

## 2017-11-28 PROCEDURE — 99233 SBSQ HOSP IP/OBS HIGH 50: CPT | Performed by: INTERNAL MEDICINE

## 2017-11-28 PROCEDURE — 700111 HCHG RX REV CODE 636 W/ 250 OVERRIDE (IP): Performed by: INTERNAL MEDICINE

## 2017-11-28 PROCEDURE — 85025 COMPLETE CBC W/AUTO DIFF WBC: CPT

## 2017-11-28 PROCEDURE — 80048 BASIC METABOLIC PNL TOTAL CA: CPT

## 2017-11-28 PROCEDURE — 700102 HCHG RX REV CODE 250 W/ 637 OVERRIDE(OP): Performed by: INTERNAL MEDICINE

## 2017-11-28 PROCEDURE — A9270 NON-COVERED ITEM OR SERVICE: HCPCS | Performed by: INTERNAL MEDICINE

## 2017-11-28 PROCEDURE — 82962 GLUCOSE BLOOD TEST: CPT | Mod: 91

## 2017-11-28 PROCEDURE — 700101 HCHG RX REV CODE 250: Performed by: INTERNAL MEDICINE

## 2017-11-28 PROCEDURE — 93971 EXTREMITY STUDY: CPT | Mod: 26 | Performed by: SURGERY

## 2017-11-28 PROCEDURE — 770006 HCHG ROOM/CARE - MED/SURG/GYN SEMI*

## 2017-11-28 PROCEDURE — 700105 HCHG RX REV CODE 258: Performed by: INTERNAL MEDICINE

## 2017-11-28 PROCEDURE — 93971 EXTREMITY STUDY: CPT

## 2017-11-28 RX ORDER — NAPROXEN 500 MG/1
250 TABLET ORAL EVERY 6 HOURS
Status: DISCONTINUED | OUTPATIENT
Start: 2017-11-28 | End: 2017-11-28

## 2017-11-28 RX ORDER — ACETAMINOPHEN 325 MG/1
1000 TABLET ORAL EVERY 6 HOURS PRN
Status: DISCONTINUED | OUTPATIENT
Start: 2017-11-28 | End: 2017-12-01 | Stop reason: HOSPADM

## 2017-11-28 RX ORDER — LABETALOL HYDROCHLORIDE 5 MG/ML
10 INJECTION, SOLUTION INTRAVENOUS EVERY 4 HOURS PRN
Status: DISCONTINUED | OUTPATIENT
Start: 2017-11-28 | End: 2017-12-01 | Stop reason: HOSPADM

## 2017-11-28 RX ORDER — AMLODIPINE BESYLATE 10 MG/1
10 TABLET ORAL
Status: DISCONTINUED | OUTPATIENT
Start: 2017-11-28 | End: 2017-12-01 | Stop reason: HOSPADM

## 2017-11-28 RX ORDER — FAMOTIDINE 20 MG/1
20 TABLET, FILM COATED ORAL 2 TIMES DAILY
Status: DISCONTINUED | OUTPATIENT
Start: 2017-11-28 | End: 2017-12-01 | Stop reason: HOSPADM

## 2017-11-28 RX ORDER — LISINOPRIL 20 MG/1
20 TABLET ORAL
Status: DISCONTINUED | OUTPATIENT
Start: 2017-11-28 | End: 2017-11-29

## 2017-11-28 RX ORDER — KETOROLAC TROMETHAMINE 30 MG/ML
30 INJECTION, SOLUTION INTRAMUSCULAR; INTRAVENOUS EVERY 6 HOURS
Status: DISCONTINUED | OUTPATIENT
Start: 2017-11-28 | End: 2017-12-01

## 2017-11-28 RX ORDER — POLYETHYLENE GLYCOL 3350 17 G/17G
1 POWDER, FOR SOLUTION ORAL DAILY
Status: DISCONTINUED | OUTPATIENT
Start: 2017-11-28 | End: 2017-12-01 | Stop reason: HOSPADM

## 2017-11-28 RX ORDER — PROMETHAZINE HYDROCHLORIDE 25 MG/1
25 TABLET ORAL EVERY 6 HOURS PRN
Status: DISCONTINUED | OUTPATIENT
Start: 2017-11-28 | End: 2017-12-01 | Stop reason: HOSPADM

## 2017-11-28 RX ADMIN — ACETAMINOPHEN 500 MG: 500 TABLET ORAL at 00:07

## 2017-11-28 RX ADMIN — ONDANSETRON 4 MG: 2 INJECTION INTRAMUSCULAR; INTRAVENOUS at 14:10

## 2017-11-28 RX ADMIN — PROMETHAZINE HYDROCHLORIDE 12.5 MG: 12.5 SUPPOSITORY RECTAL at 04:49

## 2017-11-28 RX ADMIN — HEPARIN SODIUM 5000 UNITS: 5000 INJECTION, SOLUTION INTRAVENOUS; SUBCUTANEOUS at 06:10

## 2017-11-28 RX ADMIN — OXYCODONE HYDROCHLORIDE 5 MG: 5 TABLET ORAL at 15:57

## 2017-11-28 RX ADMIN — KETOROLAC TROMETHAMINE 30 MG: 30 INJECTION, SOLUTION INTRAMUSCULAR at 20:21

## 2017-11-28 RX ADMIN — AMPICILLIN SODIUM AND SULBACTAM SODIUM 3 G: 2; 1 INJECTION, POWDER, FOR SOLUTION INTRAMUSCULAR; INTRAVENOUS at 11:47

## 2017-11-28 RX ADMIN — OXYCODONE HYDROCHLORIDE 5 MG: 5 TABLET ORAL at 00:07

## 2017-11-28 RX ADMIN — LABETALOL HYDROCHLORIDE 10 MG: 5 INJECTION, SOLUTION INTRAVENOUS at 20:20

## 2017-11-28 RX ADMIN — STANDARDIZED SENNA CONCENTRATE AND DOCUSATE SODIUM 2 TABLET: 8.6; 5 TABLET, FILM COATED ORAL at 09:15

## 2017-11-28 RX ADMIN — CEFAZOLIN SODIUM 2 G: 2 INJECTION, SOLUTION INTRAVENOUS at 06:10

## 2017-11-28 RX ADMIN — OXYCODONE HYDROCHLORIDE 5 MG: 5 TABLET ORAL at 09:16

## 2017-11-28 RX ADMIN — AMPICILLIN SODIUM AND SULBACTAM SODIUM 3 G: 2; 1 INJECTION, POWDER, FOR SOLUTION INTRAMUSCULAR; INTRAVENOUS at 16:57

## 2017-11-28 RX ADMIN — AMLODIPINE BESYLATE 10 MG: 10 TABLET ORAL at 10:18

## 2017-11-28 RX ADMIN — ACETAMINOPHEN 500 MG: 500 TABLET ORAL at 09:15

## 2017-11-28 RX ADMIN — HEPARIN SODIUM 5000 UNITS: 5000 INJECTION, SOLUTION INTRAVENOUS; SUBCUTANEOUS at 14:10

## 2017-11-28 RX ADMIN — LISINOPRIL 20 MG: 20 TABLET ORAL at 10:18

## 2017-11-28 ASSESSMENT — ENCOUNTER SYMPTOMS
DOUBLE VISION: 0
CONSTIPATION: 0
SPUTUM PRODUCTION: 0
HALLUCINATIONS: 0
WHEEZING: 0
ABDOMINAL PAIN: 0
CHILLS: 0
NAUSEA: 1
BLOOD IN STOOL: 0
FALLS: 1
VOMITING: 0
HEARTBURN: 0
DIZZINESS: 0
FLANK PAIN: 0
FEVER: 0
DIARRHEA: 0
SHORTNESS OF BREATH: 0
BLURRED VISION: 0
WEAKNESS: 1
DEPRESSION: 0
COUGH: 0
HEADACHES: 0
HEMOPTYSIS: 0

## 2017-11-28 ASSESSMENT — LIFESTYLE VARIABLES: SUBSTANCE_ABUSE: 0

## 2017-11-28 ASSESSMENT — PAIN SCALES - GENERAL
PAINLEVEL_OUTOF10: 7
PAINLEVEL_OUTOF10: 6
PAINLEVEL_OUTOF10: 4
PAINLEVEL_OUTOF10: 4
PAINLEVEL_OUTOF10: 6
PAINLEVEL_OUTOF10: 4
PAINLEVEL_OUTOF10: 0
PAINLEVEL_OUTOF10: 4
PAINLEVEL_OUTOF10: 0
PAINLEVEL_OUTOF10: 4

## 2017-11-28 ASSESSMENT — PAIN SCALES - WONG BAKER: WONGBAKER_NUMERICALRESPONSE: DOESN'T HURT AT ALL

## 2017-11-28 NOTE — THERAPY
Attempted to see pt for OT tx. Pt feeling nauseous and just took nausea medication and asked for OT to come back. Will try again later as able.

## 2017-11-28 NOTE — DISCHARGE PLANNING
CCS received Messages from the following facilities.    [facility] the referral has been denied. Not accepting CESAR Pending     [facility]. The referral has been denied Not Accepting CESAR pending

## 2017-11-28 NOTE — CONSULTS
INFECTIOUS DISEASES INPATIENT CONSULT NOTE     Date of Service: 11/28/2017    Consult Requested By: Darien Ledesma M.D.    Reason for Consultation: OM    History of Present Illness:   Sebastián Castro is a 52 y.o. WM  admitted 11/23/2017 after ground level fall.  Known to ID from recent hospitalization for gangrene and OM of the right toe   He is status post amputation and was getting OPAT.  When he was at the outpatient infusion center for his IV ertapenem,  he fell.  Initially he had no pain but later developed exquisite pain and inability to move that knee.  Denies any head trauma as well. He had some mild nausea and vomiting after the fall but otherwise feels okay now denies any systemic fevers.  Denies problems with PICC or infusions. No fever or systemic complaints    Review Of Systems:  ROS are negative except as noted above in the HPI.    PMH:   Past Medical History:   Diagnosis Date   • Hypertension          PSH:  Past Surgical History:   Procedure Laterality Date   • TOE AMPUTATION Right 11/10/2017    Procedure: TOE AMPUTATION;  Surgeon: Osorio Leo M.D.;  Location: SURGERY [facility];  Service: Orthopedics       FAMILY HX:  Denied    SOCIAL HX:  Social History     Social History   • Marital status:      Spouse name: N/A   • Number of children: N/A   • Years of education: N/A     Occupational History   • Not on file.     Social History Main Topics   • Smoking status: Never Smoker   • Smokeless tobacco: Never Used   • Alcohol use No   • Drug use: Unknown   • Sexual activity: Not on file     Other Topics Concern   • Not on file     Social History Narrative   • No narrative on file     History   Smoking Status   • Never Smoker   Smokeless Tobacco   • Never Used     History   Alcohol Use No       Allergies/Intolerances:  Allergies   Allergen Reactions   • Pcn [Penicillins] Hives and Rash      Rash & hives  Tolerating Ampicillin + Sulbactam 11/12/17       History reviewed with the patient    Other  Current Medications:    Current Facility-Administered Medications:   •  ampicillin/sulbactam (UNASYN) 3 g in  mL IVPB, 3 g, Intravenous, Q6HRS, Darien Ledesma M.D., Stopped at 11/28/17 1217  •  amlodipine (NORVASC) tablet 10 mg, 10 mg, Oral, Daily-0800, Darien Ledesma M.D., 10 mg at 11/28/17 1018  •  lisinopril (PRINIVIL) tablet 20 mg, 20 mg, Oral, Daily-0800, Darien Ledesma M.D., 20 mg at 11/28/17 1018  •  senna-docusate (PERICOLACE or SENOKOT S) 8.6-50 MG per tablet 2 Tab, 2 Tab, Oral, BID, 2 Tab at 11/28/17 0915 **AND** polyethylene glycol/lytes (MIRALAX) PACKET 1 Packet, 1 Packet, Oral, QDAY PRN **AND** magnesium hydroxide (MILK OF MAGNESIA) suspension 30 mL, 30 mL, Oral, QDAY PRN **AND** bisacodyl (DULCOLAX) suppository 10 mg, 10 mg, Rectal, QDAY PRN, Sathish Patel M.D.  •  Respiratory Care per Protocol, , Nebulization, Continuous RT, Sathish Patel M.D.  •  heparin injection 5,000 Units, 5,000 Units, Subcutaneous, Q8HRS, Sathish Patel M.D., 5,000 Units at 11/28/17 0610  •  ondansetron (ZOFRAN) syringe/vial injection 4 mg, 4 mg, Intravenous, Q4HRS PRN, Sathish Patel M.D., 4 mg at 11/27/17 2201  •  ondansetron (ZOFRAN ODT) dispertab 4 mg, 4 mg, Oral, Q4HRS PRN, Sathish Patel M.D., 4 mg at 11/24/17 1026  •  promethazine (PHENERGAN) tablet 12.5-25 mg, 12.5-25 mg, Oral, Q4HRS PRN, Sathish Patel M.D.  •  promethazine (PHENERGAN) suppository 12.5-25 mg, 12.5-25 mg, Rectal, Q4HRS PRN, Sathish Patel M.D., 12.5 mg at 11/28/17 0449  •  acetaminophen (TYLENOL) tablet 650 mg, 650 mg, Oral, Q6HRS PRN, Sathish Patel M.D.  •  insulin regular (HUMULIN R) injection 1-6 Units, 1-6 Units, Subcutaneous, 4X/DAY ACHS, Stopped at 11/27/17 2100 **AND** Accu-Chek ACHS, , , Q AC AND BEDTIME(S) **AND** NOTIFY MD and PharmD, , , Once **AND** glucose 4 g chewable tablet 16 g, 16 g, Oral, Q15 MIN PRN **AND** dextrose 50% (D50W) injection 25 mL, 25 mL, Intravenous, Q15 MIN PRN, Sathish Patel M.D.  •   oxycodone immediate-release (ROXICODONE) tablet 5 mg, 5 mg, Oral, Q4HRS PRN, Sathish Patel M.D., 5 mg at 17 0916      Most Recent Vital Signs:  BP (!) 168/77   Pulse 72   Temp 36.1 °C (97 °F)   Resp 18   Wt (!) 145 kg (319 lb 10.7 oz)   SpO2 93%   BMI 43.35 kg/m²   Temp  Av.2 °C (97.2 °F)  Min: 3.4 °C (38.1 °F)  Max: 38.2 °C (100.8 °F)    Physical Exam:  General: Obese well-appearing, well nourished no acute distress  HEENT: NCAT, PERRLA, sclera anicteric, PERRL, EOMI,  no oral lesions   Neck: supple, no lymphadenopathy  Chest: CTAB, unlabored.  Cardiac: RRR,   Abdomen: + bowel sounds, soft, non-tender, non-distended  Extremities: No cyanosis, clubbing. no edema, 2+ pulses left knee wrapped Right great toe amp site with sutures-superficial eschar. Multiple abrasions  Skin: no rashes   Neuro: Alert and oriented times 3, Speech fluent CN intact PATHAK    Pertinent Lab Results:  Recent Labs      17   0415   WBC  7.0  6.7  5.7      Recent Labs      17   0415   HEMOGLOBIN  14.1  13.8*  14.3   HEMATOCRIT  43.1  42.0  43.3   MCV  84.0  83.0  83.8   MCH  27.5  27.3  27.7   PLATELETCT  278  245  215         Recent Labs      17   0415   SODIUM  135  135  133*   POTASSIUM  3.6  3.8  3.8   CHLORIDE  102  102  99   CO2  25  26  27   CREATININE  0.57  0.49*  0.46*        No results for input(s): ALBUMIN in the last 72 hours.    Invalid input(s): AST, ALT, ALKPHOS, BILITOT, TOTALBILIRUB, BILIRUBINTOT, BILIRUBINDIR, BILIRUBININD, ALKALINEPHOS     Pertinent Micro:  Results     ** No results found for the last 168 hours. **        Blood Culture   Date Value Ref Range Status   11/10/2017 No growth after 5 days of incubation.  Final        Studies:    IMPRESSION:   Left knee pain  Gangrene of toe of right foot, s/p recent amputation   Type 2 diabetes mellitus    PLAN:   Gangrene of toe of right foot,  s/p recent amputation (CMS-HCC)- (present on admission)  Afebrile  No leukocytosis  Was tolerating infusions well  Amputation site healing slowly  Plan was for IV ertapenem through Dec 22  Switch to Zosyn while in the hosp  Sutures to be removed 12/1. Pt already scheduled with LPS rounds at outpt wound clinic on 12/1.     Torn medial and lateral meniscus, left knee  due to ground level fall  Contributing factors morbid obesity, recent surgery, DM, and physical deconditioning   Agree with plan for rehab  Fall precautions      Type 2 diabetes mellitus  Keep BS under 150 to help control current infection  Last glyco Hgb 9.2        Plan of care discussed with TARA Ledesma M.D.. Will continue to follow    Cristy Holley M.D.

## 2017-11-28 NOTE — PROGRESS NOTES
Pt resting in bed. Blood pressure elevated throughout shift. Emesis x3. Pain partially controlled with prn medication. No BM. Will continue to monitor.

## 2017-11-28 NOTE — PROGRESS NOTES
[facility] Hospitalist Progress Note    Date of Service: 2017    Chief Complaint  52 y.o. Morbidly obese male with PMH of gangrene of right great toe s/p amputation 11/10/17 admitted 2017 with failure to thrive and left knee pain    Interval Problem Update  Patient seen and evaluated on rounds. Having nausea and vomiting. I suspect this is Flagyl / Ancef related. Stopped and transitioned to Unasyn. I have discussed the case with Dr Holley from ID who will evaluate the patient for abx needs. Uncontrolled HTN noted. Requested DVT duplex of LLE.     Consultants/Specialty  Orthopedics - Dr Sanchez  Infectious disease - Dr Holley    Disposition  Continue PT / OT  Post acute placement being considered    Requirements for ongoing IV abx needs. Anticipate hospital stay > 2 midnights. Admit to inpatient orthopedics.         Review of Systems   Constitutional: Positive for malaise/fatigue. Negative for chills and fever.   HENT: Negative for hearing loss and tinnitus.    Eyes: Negative for blurred vision and double vision.   Respiratory: Negative for cough, hemoptysis, sputum production, shortness of breath and wheezing.    Cardiovascular: Positive for leg swelling. Negative for chest pain.   Gastrointestinal: Positive for nausea. Negative for abdominal pain, blood in stool, constipation, diarrhea, heartburn, melena and vomiting.   Genitourinary: Negative for dysuria, flank pain, frequency, hematuria and urgency.   Musculoskeletal: Positive for falls and joint pain.   Skin: Negative for itching and rash.   Neurological: Positive for weakness. Negative for dizziness and headaches.   Psychiatric/Behavioral: Negative for depression, hallucinations, substance abuse and suicidal ideas.   All other systems reviewed and are negative.     Physical Exam  Laboratory/Imaging   Hemodynamics  Temp (24hrs), Av.2 °C (97.2 °F), Min:36.1 °C (96.9 °F), Max:36.6 °C (97.8 °F)   Temperature: 36.2 °C (97.1 °F)  Pulse  Av.5  Min: 60   Max: 109    Blood Pressure: (!) 178/89      Respiratory      Respiration: 18, Pulse Oximetry: 93 %        RUL Breath Sounds: Clear, RML Breath Sounds: Clear, RLL Breath Sounds: Diminished;Clear, ELIAS Breath Sounds: Clear, LLL Breath Sounds: Diminished;Clear    Fluids    Intake/Output Summary (Last 24 hours) at 11/28/17 0938  Last data filed at 11/28/17 0600   Gross per 24 hour   Intake              120 ml   Output              250 ml   Net             -130 ml       Nutrition  Orders Placed This Encounter   Procedures   • Diet Order     Standing Status:   Standing     Number of Occurrences:   1     Order Specific Question:   Diet:     Answer:   Diabetic [3]     Physical Exam   Constitutional: He is oriented to person, place, and time. He appears well-developed and well-nourished. No distress.   Morbidly Obese   HENT:   Head: Normocephalic.   Mouth/Throat: Oropharynx is clear and moist. No oropharyngeal exudate.   Eyes: Conjunctivae are normal. Pupils are equal, round, and reactive to light. No scleral icterus.   Neck: Neck supple. No JVD present.   Cardiovascular: Normal rate, regular rhythm and normal heart sounds.  Exam reveals no gallop and no friction rub.    No murmur heard.  Pulmonary/Chest: Effort normal and breath sounds normal. No stridor. No respiratory distress. He has no wheezes. He has no rales.   Abdominal: Soft. Bowel sounds are normal. He exhibits no distension. There is no tenderness. There is no rebound and no guarding.   Musculoskeletal: He exhibits edema (> LLE) and tenderness (left knee. ). He exhibits no deformity.   Limited ROM of left knee due to pain.     R great toe amputation wound with some dried blood no purulent drainage noted       Neurological: He is alert and oriented to person, place, and time. No cranial nerve deficit.   strength and sensation intact   Skin: Skin is warm and dry. He is not diaphoretic. No erythema.   R toe amputation. Post surgically no drainage is noted. Good  "healing is noted.    Psychiatric: He has a normal mood and affect. His behavior is normal. Judgment and thought content normal.   Nursing note and vitals reviewed.      Recent Labs      11/26/17 0215 11/27/17 0213 11/28/17 0415   WBC  7.0  6.7  5.7   RBC  5.13  5.06  5.17   HEMOGLOBIN  14.1  13.8*  14.3   HEMATOCRIT  43.1  42.0  43.3   MCV  84.0  83.0  83.8   MCH  27.5  27.3  27.7   MCHC  32.7*  32.9*  33.0*   RDW  42.6  41.3  41.1   PLATELETCT  278  245  215   MPV  10.5  10.6  10.7     Recent Labs      11/26/17 0215 11/27/17 0213 11/28/17   0415   SODIUM  135  135  133*   POTASSIUM  3.6  3.8  3.8   CHLORIDE  102  102  99   CO2  25  26  27   GLUCOSE  129*  132*  146*   BUN  24*  22  15   CREATININE  0.57  0.49*  0.46*   CALCIUM  9.0  8.7  8.9                      Assessment/Plan     * Left knee pain- (present on admission)   Assessment & Plan    MRI reveals \"Moderate tricompartmental osteoarthritis, most in the lateral compartment. Complex tear of the body and posterior horn medial meniscus. Degenerative tearing of the anterior horn lateral meniscus. Increased signal in the superolateral aspect of Hoffa fat pad , may represent patellar tendon-lateral femoral condyle friction syndrome\"    Orthopedics recommend no surgical intervention  Continue pain control. PT/OT  Check DVT duplex of LLE  Do not recommend opioids  Start IV toradol scheduled for pain control / Inflammation control. Pepcid while on NSAIDS  Ice treatment  Weight loss, Body mass index is 43.35 kg/m².        Gangrene of toe of right foot, s/p recent amputation (CMS-[facility])- (present on admission)   Assessment & Plan    S/p Right Great toe amputation, through MTP joint  with Dr Leo on 11/10/2017  Cx with proteus and strep  ID team has recommended IV abx through 12/22/2017  LPS consult  Would stop ancef / Flagyl (causing nausea)  Start Unasyn  ID consulted. Discussed with Dr Holley. Further recommendations per ID team.   DM control      "   Failure to thrive in adult- (present on admission)   Assessment & Plan    Recurrent hospitilizations  SW looking into disposition  Post acute placement as clinically appropriate  Continue PT/OT        Hypertension- (present on admission)   Assessment & Plan    Uncontrolled  Increased amlodipine to 10 mg and lisinopril to 20 mg  Titrate to achieve normotensive control, Aim SBP < 130 with underlying DMII        Type 2 diabetes mellitus (CMS-[facility])- (present on admission)   Assessment & Plan    Uncontrolled. Recent A1C of 9.2. No apparent manifestations.   Check urine microalbumin / Cr. Check lipid profile.   Holding oral hypoglycemic agents during hospital stay.   SSI  Titrate insulin to achieve normoglycemic control        Obesity- (present on admission)   Assessment & Plan    Morbid  Body mass index is 43.35 kg/m².          Time spend: 45 minutes. > 50 % time spend providing counseling / co ordination of care.        Reviewed items::  Labs reviewed, Medications reviewed and Radiology images reviewed  Zabala catheter::  No Zabala  DVT prophylaxis pharmacological::  Heparin  DVT prophylaxis - mechanical:  SCDs  Antibiotics:  Treating active infection/contamination beyond 24 hours perioperative coverage

## 2017-11-28 NOTE — CARE PLAN
Problem: Safety  Goal: Will remain free from falls  Call light within reach    Problem: Pain Management  Goal: Pain level will decrease to patient's comfort goal  Outcome: PROGRESSING AS EXPECTED  Will dispense pain medication according to MD orders

## 2017-11-29 ENCOUNTER — APPOINTMENT (OUTPATIENT)
Dept: WOUND CARE | Facility: MEDICAL CENTER | Age: 52
End: 2017-11-29
Attending: HOSPITALIST
Payer: MEDICAID

## 2017-11-29 ENCOUNTER — APPOINTMENT (OUTPATIENT)
Dept: ONCOLOGY | Facility: MEDICAL CENTER | Age: 52
End: 2017-11-29
Attending: INTERNAL MEDICINE
Payer: MEDICAID

## 2017-11-29 LAB
ANION GAP SERPL CALC-SCNC: 9 MMOL/L (ref 0–11.9)
BASOPHILS # BLD AUTO: 0.6 % (ref 0–1.8)
BASOPHILS # BLD: 0.04 K/UL (ref 0–0.12)
BUN SERPL-MCNC: 11 MG/DL (ref 8–22)
CALCIUM SERPL-MCNC: 8.9 MG/DL (ref 8.5–10.5)
CHLORIDE SERPL-SCNC: 98 MMOL/L (ref 96–112)
CHOLEST SERPL-MCNC: 149 MG/DL (ref 100–199)
CO2 SERPL-SCNC: 27 MMOL/L (ref 20–33)
CREAT SERPL-MCNC: 0.37 MG/DL (ref 0.5–1.4)
EOSINOPHIL # BLD AUTO: 0.28 K/UL (ref 0–0.51)
EOSINOPHIL NFR BLD: 4.2 % (ref 0–6.9)
ERYTHROCYTE [DISTWIDTH] IN BLOOD BY AUTOMATED COUNT: 40.6 FL (ref 35.9–50)
GFR SERPL CREATININE-BSD FRML MDRD: >60 ML/MIN/1.73 M 2
GLUCOSE BLD-MCNC: 110 MG/DL (ref 65–99)
GLUCOSE BLD-MCNC: 112 MG/DL (ref 65–99)
GLUCOSE BLD-MCNC: 115 MG/DL (ref 65–99)
GLUCOSE BLD-MCNC: 120 MG/DL (ref 65–99)
GLUCOSE SERPL-MCNC: 121 MG/DL (ref 65–99)
HCT VFR BLD AUTO: 43.8 % (ref 42–52)
HDLC SERPL-MCNC: 32 MG/DL
HGB BLD-MCNC: 14.8 G/DL (ref 14–18)
IMM GRANULOCYTES # BLD AUTO: 0.02 K/UL (ref 0–0.11)
IMM GRANULOCYTES NFR BLD AUTO: 0.3 % (ref 0–0.9)
LDLC SERPL CALC-MCNC: 86 MG/DL
LYMPHOCYTES # BLD AUTO: 1.99 K/UL (ref 1–4.8)
LYMPHOCYTES NFR BLD: 30 % (ref 22–41)
MCH RBC QN AUTO: 28.2 PG (ref 27–33)
MCHC RBC AUTO-ENTMCNC: 33.8 G/DL (ref 33.7–35.3)
MCV RBC AUTO: 83.4 FL (ref 81.4–97.8)
MONOCYTES # BLD AUTO: 0.48 K/UL (ref 0–0.85)
MONOCYTES NFR BLD AUTO: 7.2 % (ref 0–13.4)
NEUTROPHILS # BLD AUTO: 3.83 K/UL (ref 1.82–7.42)
NEUTROPHILS NFR BLD: 57.7 % (ref 44–72)
NRBC # BLD AUTO: 0 K/UL
NRBC BLD AUTO-RTO: 0 /100 WBC
PLATELET # BLD AUTO: 245 K/UL (ref 164–446)
PMV BLD AUTO: 10.3 FL (ref 9–12.9)
POTASSIUM SERPL-SCNC: 3.4 MMOL/L (ref 3.6–5.5)
RBC # BLD AUTO: 5.25 M/UL (ref 4.7–6.1)
SODIUM SERPL-SCNC: 134 MMOL/L (ref 135–145)
TRIGL SERPL-MCNC: 153 MG/DL (ref 0–149)
WBC # BLD AUTO: 6.6 K/UL (ref 4.8–10.8)

## 2017-11-29 PROCEDURE — 700111 HCHG RX REV CODE 636 W/ 250 OVERRIDE (IP): Performed by: INTERNAL MEDICINE

## 2017-11-29 PROCEDURE — 700105 HCHG RX REV CODE 258: Performed by: INTERNAL MEDICINE

## 2017-11-29 PROCEDURE — 97116 GAIT TRAINING THERAPY: CPT

## 2017-11-29 PROCEDURE — 700102 HCHG RX REV CODE 250 W/ 637 OVERRIDE(OP): Performed by: INTERNAL MEDICINE

## 2017-11-29 PROCEDURE — 80061 LIPID PANEL: CPT

## 2017-11-29 PROCEDURE — 99232 SBSQ HOSP IP/OBS MODERATE 35: CPT | Performed by: INTERNAL MEDICINE

## 2017-11-29 PROCEDURE — 80048 BASIC METABOLIC PNL TOTAL CA: CPT

## 2017-11-29 PROCEDURE — 82962 GLUCOSE BLOOD TEST: CPT | Mod: 91

## 2017-11-29 PROCEDURE — 770006 HCHG ROOM/CARE - MED/SURG/GYN SEMI*

## 2017-11-29 PROCEDURE — 700101 HCHG RX REV CODE 250: Performed by: INTERNAL MEDICINE

## 2017-11-29 PROCEDURE — 97110 THERAPEUTIC EXERCISES: CPT

## 2017-11-29 PROCEDURE — A9270 NON-COVERED ITEM OR SERVICE: HCPCS | Performed by: INTERNAL MEDICINE

## 2017-11-29 PROCEDURE — 97535 SELF CARE MNGMENT TRAINING: CPT

## 2017-11-29 PROCEDURE — 85025 COMPLETE CBC W/AUTO DIFF WBC: CPT

## 2017-11-29 RX ORDER — POTASSIUM CHLORIDE 20 MEQ/1
40 TABLET, EXTENDED RELEASE ORAL EVERY 4 HOURS
Status: DISCONTINUED | OUTPATIENT
Start: 2017-11-29 | End: 2017-11-29

## 2017-11-29 RX ORDER — POTASSIUM CHLORIDE 20 MEQ/1
40 TABLET, EXTENDED RELEASE ORAL EVERY 4 HOURS
Status: COMPLETED | OUTPATIENT
Start: 2017-11-29 | End: 2017-11-29

## 2017-11-29 RX ORDER — DIPHENHYDRAMINE HCL 25 MG
25 TABLET ORAL EVERY 6 HOURS PRN
Status: DISCONTINUED | OUTPATIENT
Start: 2017-11-29 | End: 2017-12-01 | Stop reason: HOSPADM

## 2017-11-29 RX ORDER — LISINOPRIL 20 MG/1
40 TABLET ORAL
Status: DISCONTINUED | OUTPATIENT
Start: 2017-11-30 | End: 2017-12-01 | Stop reason: HOSPADM

## 2017-11-29 RX ORDER — DIPHENHYDRAMINE HYDROCHLORIDE 50 MG/ML
25 INJECTION INTRAMUSCULAR; INTRAVENOUS EVERY 6 HOURS PRN
Status: DISCONTINUED | OUTPATIENT
Start: 2017-11-29 | End: 2017-11-30

## 2017-11-29 RX ORDER — PRAVASTATIN SODIUM 10 MG
20 TABLET ORAL EVERY EVENING
Status: DISCONTINUED | OUTPATIENT
Start: 2017-11-29 | End: 2017-12-01 | Stop reason: HOSPADM

## 2017-11-29 RX ADMIN — ASPIRIN 81 MG: 81 TABLET, COATED ORAL at 16:12

## 2017-11-29 RX ADMIN — HEPARIN SODIUM 5000 UNITS: 5000 INJECTION, SOLUTION INTRAVENOUS; SUBCUTANEOUS at 14:08

## 2017-11-29 RX ADMIN — KETOROLAC TROMETHAMINE 30 MG: 30 INJECTION, SOLUTION INTRAMUSCULAR at 00:20

## 2017-11-29 RX ADMIN — AMPICILLIN SODIUM AND SULBACTAM SODIUM 3 G: 2; 1 INJECTION, POWDER, FOR SOLUTION INTRAMUSCULAR; INTRAVENOUS at 05:29

## 2017-11-29 RX ADMIN — TAZOBACTAM SODIUM AND PIPERACILLIN SODIUM 3.38 G: 375; 3 INJECTION, SOLUTION INTRAVENOUS at 23:23

## 2017-11-29 RX ADMIN — TAZOBACTAM SODIUM AND PIPERACILLIN SODIUM 3.38 G: 375; 3 INJECTION, SOLUTION INTRAVENOUS at 18:27

## 2017-11-29 RX ADMIN — HEPARIN SODIUM 5000 UNITS: 5000 INJECTION, SOLUTION INTRAVENOUS; SUBCUTANEOUS at 20:09

## 2017-11-29 RX ADMIN — KETOROLAC TROMETHAMINE 30 MG: 30 INJECTION, SOLUTION INTRAMUSCULAR at 12:04

## 2017-11-29 RX ADMIN — KETOROLAC TROMETHAMINE 30 MG: 30 INJECTION, SOLUTION INTRAMUSCULAR at 05:28

## 2017-11-29 RX ADMIN — KETOROLAC TROMETHAMINE 30 MG: 30 INJECTION, SOLUTION INTRAMUSCULAR at 18:30

## 2017-11-29 RX ADMIN — HEPARIN SODIUM 5000 UNITS: 5000 INJECTION, SOLUTION INTRAVENOUS; SUBCUTANEOUS at 00:20

## 2017-11-29 RX ADMIN — HEPARIN SODIUM 5000 UNITS: 5000 INJECTION, SOLUTION INTRAVENOUS; SUBCUTANEOUS at 05:28

## 2017-11-29 RX ADMIN — AMPICILLIN SODIUM AND SULBACTAM SODIUM 3 G: 2; 1 INJECTION, POWDER, FOR SOLUTION INTRAMUSCULAR; INTRAVENOUS at 12:19

## 2017-11-29 RX ADMIN — FAMOTIDINE 20 MG: 20 TABLET, FILM COATED ORAL at 20:08

## 2017-11-29 RX ADMIN — POTASSIUM CHLORIDE 40 MEQ: 1500 TABLET, EXTENDED RELEASE ORAL at 14:08

## 2017-11-29 RX ADMIN — ACETAMINOPHEN 975 MG: 325 TABLET, FILM COATED ORAL at 08:42

## 2017-11-29 RX ADMIN — POTASSIUM CHLORIDE 40 MEQ: 1500 TABLET, EXTENDED RELEASE ORAL at 08:42

## 2017-11-29 RX ADMIN — KETOROLAC TROMETHAMINE 30 MG: 30 INJECTION, SOLUTION INTRAMUSCULAR at 23:23

## 2017-11-29 RX ADMIN — LISINOPRIL 20 MG: 20 TABLET ORAL at 08:42

## 2017-11-29 RX ADMIN — AMPICILLIN SODIUM AND SULBACTAM SODIUM 3 G: 2; 1 INJECTION, POWDER, FOR SOLUTION INTRAMUSCULAR; INTRAVENOUS at 00:20

## 2017-11-29 RX ADMIN — PRAVASTATIN SODIUM 20 MG: 10 TABLET ORAL at 20:08

## 2017-11-29 RX ADMIN — LABETALOL HYDROCHLORIDE 10 MG: 5 INJECTION, SOLUTION INTRAVENOUS at 20:08

## 2017-11-29 RX ADMIN — FAMOTIDINE 20 MG: 20 TABLET, FILM COATED ORAL at 08:42

## 2017-11-29 RX ADMIN — AMLODIPINE BESYLATE 10 MG: 10 TABLET ORAL at 08:42

## 2017-11-29 ASSESSMENT — LIFESTYLE VARIABLES: SUBSTANCE_ABUSE: 0

## 2017-11-29 ASSESSMENT — COGNITIVE AND FUNCTIONAL STATUS - GENERAL
MOBILITY SCORE: 18
SUGGESTED CMS G CODE MODIFIER MOBILITY: CK
HELP NEEDED FOR BATHING: A LITTLE
DAILY ACTIVITIY SCORE: 22
CLIMB 3 TO 5 STEPS WITH RAILING: A LOT
TURNING FROM BACK TO SIDE WHILE IN FLAT BAD: A LITTLE
SUGGESTED CMS G CODE MODIFIER DAILY ACTIVITY: CJ
MOVING FROM LYING ON BACK TO SITTING ON SIDE OF FLAT BED: A LITTLE
STANDING UP FROM CHAIR USING ARMS: A LITTLE
WALKING IN HOSPITAL ROOM: A LITTLE
DRESSING REGULAR LOWER BODY CLOTHING: A LITTLE

## 2017-11-29 ASSESSMENT — ENCOUNTER SYMPTOMS
HEARTBURN: 0
CHILLS: 0
DEPRESSION: 0
DIZZINESS: 0
BLURRED VISION: 0
FLANK PAIN: 0
NAUSEA: 1
FEVER: 0
DIARRHEA: 0
CONSTIPATION: 0
HEADACHES: 0
FALLS: 1
WEAKNESS: 1
HALLUCINATIONS: 0
VOMITING: 0
WHEEZING: 0
DOUBLE VISION: 0
BLOOD IN STOOL: 0
SPUTUM PRODUCTION: 0
SHORTNESS OF BREATH: 0
HEMOPTYSIS: 0
ABDOMINAL PAIN: 0
COUGH: 0

## 2017-11-29 ASSESSMENT — GAIT ASSESSMENTS
GAIT LEVEL OF ASSIST: STAND BY ASSIST
DISTANCE (FEET): 150
ASSISTIVE DEVICE: FRONT WHEEL WALKER
DEVIATION: ANTALGIC;BRADYKINETIC;DECREASED HEEL STRIKE;DECREASED TOE OFF

## 2017-11-29 ASSESSMENT — PAIN SCALES - GENERAL: PAINLEVEL_OUTOF10: 0

## 2017-11-29 NOTE — PROGRESS NOTES
Per Dr Ledesma, Ace wrap applied in place of requested knee brace. Pt very appreciative of Ace wrap, pt stated he feels great.

## 2017-11-29 NOTE — PROGRESS NOTES
Shift report received from night RN, assumed care at 0715. Patient awake in bed, AOx4, patient not currently expressing any pain at this time, routinely medicated prn per MAR. Pt up standby assist with FWW, calls appropriately, bed alarm not in use. PICC assessed CDI dressing, no s/s of infection, draws blood back. Elastic dressing to L knee, foam dressing to R greater toe both dry and intact. O2 RA, SPO2 93%. VTE Heparin and SCDs. Plan is dc when accepted to SNF and bed available. Discussed POC for multimodal pain management, monitoring VS/labs/I&O, mobility, day time routine, comfort, and safety. Patient has call light and personal belongings within reach. Safety and fall precautions in place. Reviewed current labs, notes, medications, and orders. Hourly rounding in place with RN rounding on odd hours and CNA on even hours.

## 2017-11-29 NOTE — CARE PLAN
Problem: Safety  Goal: Will remain free from falls  Outcome: PROGRESSING AS EXPECTED  Call light within reach    Problem: Pain Management  Goal: Pain level will decrease to patient's comfort goal  Outcome: PROGRESSING AS EXPECTED  Pain medication will be dispensed according to MD orders

## 2017-11-29 NOTE — THERAPY
"Occupational Therapy Treatment completed with focus on ADLs, ADL transfers and patient education.  Functional Status:  Pt seen for OT tx. Pt reports feeling much better than yesterday. Pt completed supine to sit w/ supervision, demonstrated ability to don/doff off-loading boot w/ SBA, completed toilet transfer w/ supervision and demonstrated ability to complete standing ADLs at sink w/ SBA. Pt c/o pain during session and was limited requiring standing breaks between tasks.   Plan of Care: Will benefit from Occupational Therapy 3 times per week  Discharge Recommendations:  Equipment Will Continue to Assess for Equipment Needs.   See \"Rehab Therapy-Acute\" Patient Summary Report for complete documentation.   "

## 2017-11-29 NOTE — DISCHARGE PLANNING
Updated Clinical note:  afebrile  Hypertensive -170s-BP medication adjustment done 11/28/17  WBC (n)- IV abx  Afebrile    Update Discharge Planning:  Medicaid pending.   Referrals have been sent to SNF but since pt has no insurance at this time the referrals have been denied.     ID recommends Ertapenem through 12/22  Unasyn while inhouse    Addendum:  Discussed during morning rounds.   CCS will follow up on SNF referral.

## 2017-11-29 NOTE — THERAPY
Attempted to see pt for PT tx today. Pt c/o nausea and would like to try later. Will reattempt as able.

## 2017-11-29 NOTE — THERAPY
"Pt agreeable to tx today. Pt mobility is improving. Pt able to amb further today. Pt tolerated increased distance. Pt did not tolerate making turns or an increase in amb speed, and experienced 1 LOB w/good recovery. Pt aware of deficit. Pt amb w/decreased heel-toe off and required verbal cues for correction. Pt strength in L LE seems to be improving. pt able to perform LE therexs and was able to demonstrate 4 SLRs before fatiguing. Pt educated about imortance of performing HEP for improved amb tolerance and coordination. Pt receptive. Though pt demonstrated an improvement of strength, pt unable to attempt stair training. In order for a safe DC home, it is necessary for pt to be able to ascend/descend stairs as he lives alone where assistance is not available. Pt would benefit from further acute PT txs to progress towards goals and independence.    Physical Therapy Treatment completed.   Bed Mobility:  Supine to Sit: Modified Independent  Transfers: Sit to Stand: Supervised  Gait: Level Of Assist: Stand by Assist with Front-Wheel Walker       Plan of Care: Will benefit from Physical Therapy 5 times per week      See \"Rehab Therapy-Acute\" Patient Summary Report for complete documentation.       "

## 2017-11-29 NOTE — PROGRESS NOTES
[facility]    Date of Service: 2017    Chief Complaint  52 y.o. Morbidly obese male with PMH of gangrene of right great toe s/p amputation 11/10/17 admitted 2017 with failure to thrive and left knee pain    Interval Problem Update  Patient seen and evaluated on rounds.   DVT negative  Continue pain control  Improved compared to yesterday  Monitor BMP while using NSAIDs  Anti HTN regimen titrated    Consultants/Specialty  Orthopedics - Dr Sanchez  Infectious disease - Dr Holley    Disposition  Continue PT / OT  Post acute placement being considered    Requirements for ongoing IV abx needs. Anticipate hospital stay > 2 midnights. Admit to inpatient orthopedics.         Review of Systems   Constitutional: Positive for malaise/fatigue. Negative for chills and fever.   HENT: Negative for hearing loss and tinnitus.    Eyes: Negative for blurred vision and double vision.   Respiratory: Negative for cough, hemoptysis, sputum production, shortness of breath and wheezing.    Cardiovascular: Positive for leg swelling. Negative for chest pain.   Gastrointestinal: Positive for nausea. Negative for abdominal pain, blood in stool, constipation, diarrhea, heartburn, melena and vomiting.   Genitourinary: Negative for dysuria, flank pain, frequency, hematuria and urgency.   Musculoskeletal: Positive for falls and joint pain.   Skin: Negative for itching and rash.   Neurological: Positive for weakness. Negative for dizziness and headaches.   Psychiatric/Behavioral: Negative for depression, hallucinations, substance abuse and suicidal ideas.   All other systems reviewed and are negative.     Physical Exam  Laboratory/Imaging   Hemodynamics  Temp (24hrs), Av.3 °C (97.4 °F), Min:36.1 °C (97 °F), Max:36.4 °C (97.6 °F)   Temperature: 36.4 °C (97.6 °F)  Pulse  Av.9  Min: 60  Max: 109    Blood Pressure: 140/64      Respiratory      Respiration: 17, Pulse Oximetry: 91 %        RUL Breath Sounds:  Clear, RML Breath Sounds: Clear, RLL Breath Sounds: Clear, ELIAS Breath Sounds: Clear, LLL Breath Sounds: Clear    Fluids    Intake/Output Summary (Last 24 hours) at 11/29/17 1539  Last data filed at 11/29/17 0000   Gross per 24 hour   Intake              240 ml   Output              550 ml   Net             -310 ml       Nutrition  Orders Placed This Encounter   Procedures   • Diet Order     Standing Status:   Standing     Number of Occurrences:   1     Order Specific Question:   Diet:     Answer:   Diabetic [3]     Physical Exam   Constitutional: He is oriented to person, place, and time. He appears well-developed and well-nourished. No distress.   Morbidly Obese   HENT:   Head: Normocephalic.   Mouth/Throat: Oropharynx is clear and moist. No oropharyngeal exudate.   Eyes: Conjunctivae are normal. Pupils are equal, round, and reactive to light. No scleral icterus.   Neck: Neck supple. No JVD present.   Cardiovascular: Normal rate, regular rhythm and normal heart sounds.  Exam reveals no gallop and no friction rub.    No murmur heard.  Pulmonary/Chest: Effort normal and breath sounds normal. No stridor. No respiratory distress. He has no wheezes. He has no rales.   Abdominal: Soft. Bowel sounds are normal. He exhibits no distension. There is no tenderness. There is no rebound and no guarding.   Musculoskeletal: He exhibits edema (> LLE) and tenderness (left knee. ). He exhibits no deformity.   Limited ROM of left knee due to pain.     R great toe amputation wound with some dried blood no purulent drainage noted       Neurological: He is alert and oriented to person, place, and time. No cranial nerve deficit.   strength and sensation intact   Skin: Skin is warm and dry. He is not diaphoretic. No erythema.   R toe amputation. Post surgically no drainage is noted. Good healing is noted.    Psychiatric: He has a normal mood and affect. His behavior is normal. Judgment and thought content normal.   Nursing note and vitals  "reviewed.      Recent Labs      11/27/17   0213  11/28/17   0415  11/29/17   0600   WBC  6.7  5.7  6.6   RBC  5.06  5.17  5.25   HEMOGLOBIN  13.8*  14.3  14.8   HEMATOCRIT  42.0  43.3  43.8   MCV  83.0  83.8  83.4   MCH  27.3  27.7  28.2   MCHC  32.9*  33.0*  33.8   RDW  41.3  41.1  40.6   PLATELETCT  245  215  245   MPV  10.6  10.7  10.3     Recent Labs      11/27/17   0213  11/28/17   0415  11/29/17   0600   SODIUM  135  133*  134*   POTASSIUM  3.8  3.8  3.4*   CHLORIDE  102  99  98   CO2  26  27  27   GLUCOSE  132*  146*  121*   BUN  22  15  11   CREATININE  0.49*  0.46*  0.37*   CALCIUM  8.7  8.9  8.9             Recent Labs      11/29/17   0600   TRIGLYCERIDE  153*   HDL  32*   LDL  86          Assessment/Plan     * Left knee pain- (present on admission)   Assessment & Plan    MRI reveals \"Moderate tricompartmental osteoarthritis, most in the lateral compartment. Complex tear of the body and posterior horn medial meniscus. Degenerative tearing of the anterior horn lateral meniscus. Increased signal in the superolateral aspect of Hoffa fat pad , may represent patellar tendon-lateral femoral condyle friction syndrome\"    Orthopedics recommend no surgical intervention  Continue pain control. PT/OT  DVT study negative  Do not recommend opioids  Continue IV toradol scheduled for pain control / Inflammation control. Pepcid while on NSAIDS  Ice treatment  Weight loss, Body mass index is 43.35 kg/m².        Gangrene of toe of right foot, s/p recent amputation (CMS-[facility])- (present on admission)   Assessment & Plan    S/p Right Great toe amputation, through MTP joint  with Dr Leo on 11/10/2017  Cx with proteus and strep  ID team has recommended IV abx through 12/22/2017  LPS consult  Continue IV Unasyn  ID team on board  DM control        Failure to thrive in adult- (present on admission)   Assessment & Plan    Recurrent hospitilizations  SW looking into disposition  Post acute placement as clinically " appropriate  Continue PT/OT        Hypertension- (present on admission)   Assessment & Plan    Uncontrolled  Increased amlodipine to 10 mg and lisinopril to 40 mg  Titrate to achieve normotensive control, Aim SBP < 130 with underlying DMII        Type 2 diabetes mellitus (CMS-HCC)- (present on admission)   Assessment & Plan    Uncontrolled. Recent A1C of 9.2. No apparent manifestations.   Check urine microalbumin - pending.  ASA 81  Pravastatin 20 mg   Holding oral hypoglycemic agents during hospital stay.   SSI  Titrate insulin to achieve normoglycemic control        Obesity- (present on admission)   Assessment & Plan    Morbid  Body mass index is 43.35 kg/m².            Reviewed items::  Labs reviewed, Medications reviewed and Radiology images reviewed  Zabala catheter::  No Zabala  DVT prophylaxis pharmacological::  Heparin  DVT prophylaxis - mechanical:  SCDs  Antibiotics:  Treating active infection/contamination beyond 24 hours perioperative coverage

## 2017-11-30 ENCOUNTER — APPOINTMENT (OUTPATIENT)
Dept: ONCOLOGY | Facility: MEDICAL CENTER | Age: 52
End: 2017-11-30
Attending: INTERNAL MEDICINE
Payer: MEDICAID

## 2017-11-30 LAB
ANION GAP SERPL CALC-SCNC: 6 MMOL/L (ref 0–11.9)
BUN SERPL-MCNC: 18 MG/DL (ref 8–22)
CALCIUM SERPL-MCNC: 9.2 MG/DL (ref 8.5–10.5)
CHLORIDE SERPL-SCNC: 99 MMOL/L (ref 96–112)
CO2 SERPL-SCNC: 27 MMOL/L (ref 20–33)
CREAT SERPL-MCNC: 0.49 MG/DL (ref 0.5–1.4)
CREAT UR-MCNC: 119.5 MG/DL
GFR SERPL CREATININE-BSD FRML MDRD: >60 ML/MIN/1.73 M 2
GLUCOSE BLD-MCNC: 119 MG/DL (ref 65–99)
GLUCOSE BLD-MCNC: 141 MG/DL (ref 65–99)
GLUCOSE BLD-MCNC: 155 MG/DL (ref 65–99)
GLUCOSE SERPL-MCNC: 128 MG/DL (ref 65–99)
MICROALBUMIN UR-MCNC: 18.5 MG/DL
MICROALBUMIN/CREAT UR: 155 MG/G (ref 0–30)
POTASSIUM SERPL-SCNC: 4 MMOL/L (ref 3.6–5.5)
SODIUM SERPL-SCNC: 132 MMOL/L (ref 135–145)

## 2017-11-30 PROCEDURE — 306589 SLEEVE,VASO CALF LARGE: Performed by: INTERNAL MEDICINE

## 2017-11-30 PROCEDURE — 770006 HCHG ROOM/CARE - MED/SURG/GYN SEMI*

## 2017-11-30 PROCEDURE — 99232 SBSQ HOSP IP/OBS MODERATE 35: CPT | Performed by: INTERNAL MEDICINE

## 2017-11-30 PROCEDURE — 97116 GAIT TRAINING THERAPY: CPT

## 2017-11-30 PROCEDURE — 82043 UR ALBUMIN QUANTITATIVE: CPT

## 2017-11-30 PROCEDURE — 82570 ASSAY OF URINE CREATININE: CPT

## 2017-11-30 PROCEDURE — A9270 NON-COVERED ITEM OR SERVICE: HCPCS | Performed by: INTERNAL MEDICINE

## 2017-11-30 PROCEDURE — 97110 THERAPEUTIC EXERCISES: CPT

## 2017-11-30 PROCEDURE — 82962 GLUCOSE BLOOD TEST: CPT

## 2017-11-30 PROCEDURE — 80048 BASIC METABOLIC PNL TOTAL CA: CPT

## 2017-11-30 PROCEDURE — 700111 HCHG RX REV CODE 636 W/ 250 OVERRIDE (IP): Performed by: INTERNAL MEDICINE

## 2017-11-30 PROCEDURE — 700102 HCHG RX REV CODE 250 W/ 637 OVERRIDE(OP): Performed by: INTERNAL MEDICINE

## 2017-11-30 RX ORDER — HYDROCHLOROTHIAZIDE 25 MG/1
25 TABLET ORAL
Status: DISCONTINUED | OUTPATIENT
Start: 2017-11-30 | End: 2017-12-01 | Stop reason: HOSPADM

## 2017-11-30 RX ORDER — CARVEDILOL 3.12 MG/1
3.12 TABLET ORAL 2 TIMES DAILY WITH MEALS
Status: DISCONTINUED | OUTPATIENT
Start: 2017-11-30 | End: 2017-12-01 | Stop reason: HOSPADM

## 2017-11-30 RX ADMIN — KETOROLAC TROMETHAMINE 30 MG: 30 INJECTION, SOLUTION INTRAMUSCULAR at 13:22

## 2017-11-30 RX ADMIN — FAMOTIDINE 20 MG: 20 TABLET, FILM COATED ORAL at 09:00

## 2017-11-30 RX ADMIN — CARVEDILOL 3.12 MG: 3.12 TABLET, FILM COATED ORAL at 10:18

## 2017-11-30 RX ADMIN — PRAVASTATIN SODIUM 20 MG: 10 TABLET ORAL at 21:26

## 2017-11-30 RX ADMIN — HEPARIN SODIUM 5000 UNITS: 5000 INJECTION, SOLUTION INTRAVENOUS; SUBCUTANEOUS at 21:26

## 2017-11-30 RX ADMIN — TAZOBACTAM SODIUM AND PIPERACILLIN SODIUM 3.38 G: 375; 3 INJECTION, SOLUTION INTRAVENOUS at 13:22

## 2017-11-30 RX ADMIN — ACETAMINOPHEN 975 MG: 325 TABLET, FILM COATED ORAL at 21:26

## 2017-11-30 RX ADMIN — TAZOBACTAM SODIUM AND PIPERACILLIN SODIUM 3.38 G: 375; 3 INJECTION, SOLUTION INTRAVENOUS at 04:33

## 2017-11-30 RX ADMIN — INSULIN HUMAN 1 UNITS: 100 INJECTION, SOLUTION PARENTERAL at 21:23

## 2017-11-30 RX ADMIN — HEPARIN SODIUM 5000 UNITS: 5000 INJECTION, SOLUTION INTRAVENOUS; SUBCUTANEOUS at 13:22

## 2017-11-30 RX ADMIN — FAMOTIDINE 20 MG: 20 TABLET, FILM COATED ORAL at 21:26

## 2017-11-30 RX ADMIN — TAZOBACTAM SODIUM AND PIPERACILLIN SODIUM 3.38 G: 375; 3 INJECTION, SOLUTION INTRAVENOUS at 21:26

## 2017-11-30 RX ADMIN — KETOROLAC TROMETHAMINE 30 MG: 30 INJECTION, SOLUTION INTRAMUSCULAR at 23:27

## 2017-11-30 RX ADMIN — KETOROLAC TROMETHAMINE 30 MG: 30 INJECTION, SOLUTION INTRAMUSCULAR at 04:33

## 2017-11-30 RX ADMIN — ASPIRIN 81 MG: 81 TABLET, COATED ORAL at 10:19

## 2017-11-30 RX ADMIN — LABETALOL HYDROCHLORIDE 10 MG: 5 INJECTION, SOLUTION INTRAVENOUS at 04:33

## 2017-11-30 RX ADMIN — HYDROCHLOROTHIAZIDE 25 MG: 25 TABLET ORAL at 10:19

## 2017-11-30 RX ADMIN — CARVEDILOL 3.12 MG: 3.12 TABLET, FILM COATED ORAL at 18:42

## 2017-11-30 RX ADMIN — HEPARIN SODIUM 5000 UNITS: 5000 INJECTION, SOLUTION INTRAVENOUS; SUBCUTANEOUS at 04:36

## 2017-11-30 RX ADMIN — LISINOPRIL 40 MG: 20 TABLET ORAL at 10:18

## 2017-11-30 RX ADMIN — AMLODIPINE BESYLATE 10 MG: 10 TABLET ORAL at 10:18

## 2017-11-30 RX ADMIN — KETOROLAC TROMETHAMINE 30 MG: 30 INJECTION, SOLUTION INTRAMUSCULAR at 18:42

## 2017-11-30 ASSESSMENT — ENCOUNTER SYMPTOMS
NAUSEA: 1
DOUBLE VISION: 0
CHILLS: 0
DEPRESSION: 0
DIARRHEA: 0
HALLUCINATIONS: 0
FEVER: 0
WHEEZING: 0
VOMITING: 0
FALLS: 1
FLANK PAIN: 0
NAUSEA: 0
BLOOD IN STOOL: 0
SHORTNESS OF BREATH: 0
COUGH: 0
HEADACHES: 0
DIZZINESS: 0
CONSTIPATION: 0
BLURRED VISION: 0
SPUTUM PRODUCTION: 0
ABDOMINAL PAIN: 0
WEAKNESS: 1
HEARTBURN: 0
HEMOPTYSIS: 0

## 2017-11-30 ASSESSMENT — GAIT ASSESSMENTS
DEVIATION: ANTALGIC;BRADYKINETIC;DECREASED HEEL STRIKE;DECREASED TOE OFF
DISTANCE (FEET): 150
ASSISTIVE DEVICE: FRONT WHEEL WALKER
GAIT LEVEL OF ASSIST: CONTACT GUARD ASSIST

## 2017-11-30 ASSESSMENT — COGNITIVE AND FUNCTIONAL STATUS - GENERAL
MOVING FROM LYING ON BACK TO SITTING ON SIDE OF FLAT BED: A LITTLE
STANDING UP FROM CHAIR USING ARMS: A LITTLE
CLIMB 3 TO 5 STEPS WITH RAILING: A LOT
TURNING FROM BACK TO SIDE WHILE IN FLAT BAD: A LITTLE
MOBILITY SCORE: 17
WALKING IN HOSPITAL ROOM: A LITTLE
SUGGESTED CMS G CODE MODIFIER MOBILITY: CK
MOVING TO AND FROM BED TO CHAIR: A LITTLE

## 2017-11-30 ASSESSMENT — PAIN SCALES - WONG BAKER
WONGBAKER_NUMERICALRESPONSE: DOESN'T HURT AT ALL
WONGBAKER_NUMERICALRESPONSE: DOESN'T HURT AT ALL

## 2017-11-30 ASSESSMENT — PAIN SCALES - GENERAL: PAINLEVEL_OUTOF10: 7

## 2017-11-30 ASSESSMENT — LIFESTYLE VARIABLES: SUBSTANCE_ABUSE: 0

## 2017-11-30 NOTE — PROGRESS NOTES
Infectious Disease Progress Note    Author: Cristy Holley M.D. Date & Time of service: 2017  2:09 PM    Chief Complaint:  OM    Interval History:  52 y.o. WM  admitted 2017 after ground level fall.  Known to ID from recent hospitalization for gangrene and OM of the right toe    AF pain much improved-denies SE abx    Labs Reviewed, Medications Reviewed, Radiology Reviewed and Wound Reviewed.    Review of Systems:  Review of Systems   Constitutional: Negative for chills and fever.   Gastrointestinal: Negative for abdominal pain, diarrhea, nausea and vomiting.   Musculoskeletal: Positive for joint pain.   All other systems reviewed and are negative.      Hemodynamics:  Temp (24hrs), Av.5 °C (97.7 °F), Min:36.2 °C (97.1 °F), Max:36.8 °C (98.2 °F)  Temperature:  (Q12 vitals per RN)  Pulse  Av.2  Min: 60  Max: 120   Blood Pressure: (!) 174/91       Physical Exam:  Physical Exam   Constitutional: He appears well-developed. No distress.   Obese   HENT:   Head: Normocephalic and atraumatic.   Eyes: EOM are normal. Pupils are equal, round, and reactive to light.   Neck: Neck supple.   Cardiovascular: Normal rate.    Pulmonary/Chest: Effort normal. No respiratory distress.   Abdominal: Soft. He exhibits no distension. There is no tenderness.   Musculoskeletal: He exhibits edema.   LUE PICC notender  Left knee wrapped  Right great toe amp site with sutures-approx eschar   Neurological: He is alert.   Skin: No rash noted.   Nursing note and vitals reviewed.      Meds:    Current Facility-Administered Medications:   •  hydrochlorothiazide  •  carvedilol  •  lisinopril  •  pravastatin  •  aspirin EC  •  [COMPLETED] piperacillin-tazobactam **AND** piperacillin-tazobactam  •  diphenhydrAMINE **OR** [DISCONTINUED] diphenhydrAMINE  •  amlodipine  •  labetalol  •  polyethylene glycol/lytes  •  acetaminophen  •  famotidine  •  promethazine  •  ketorolac  •  Respiratory Care per Protocol  •  heparin  •   ondansetron  •  ondansetron  •  insulin regular **AND** Accu-Chek ACHS **AND** NOTIFY MD and PharmD **AND** glucose 4 g **AND** dextrose 50%    Labs:  Recent Labs      11/28/17 0415 11/29/17   0600   WBC  5.7  6.6   RBC  5.17  5.25   HEMOGLOBIN  14.3  14.8   HEMATOCRIT  43.3  43.8   MCV  83.8  83.4   MCH  27.7  28.2   RDW  41.1  40.6   PLATELETCT  215  245   MPV  10.7  10.3   NEUTSPOLYS  63.10  57.70   LYMPHOCYTES  26.70  30.00   MONOCYTES  5.20  7.20   EOSINOPHILS  3.80  4.20   BASOPHILS  0.90  0.60     Recent Labs      11/28/17 0415 11/29/17 0600 11/30/17   0431   SODIUM  133*  134*  132*   POTASSIUM  3.8  3.4*  4.0   CHLORIDE  99  98  99   CO2  27  27  27   GLUCOSE  146*  121*  128*   BUN  15  11  18     Recent Labs      11/28/17 0415 11/29/17 0600 11/30/17   0431   CREATININE  0.46*  0.37*  0.49*       Imaging:  Ct-knee W/o Plus Recons Left    Result Date: 11/24/2017 11/24/2017 10:05 AM HISTORY/REASON FOR EXAM: Left knee pain after ground-level fall. TECHNIQUE/ EXAM DESCRIPTION AND NUMBER OF VIEWS: CT scan of the LEFT knee without contrast, with reconstructions. Noncontrast thin helical sections were obtained from the distal femur through the proximal tibia/fibula. Sagittal and coronal reconstructions were generated from the axial images. Up to date radiation dose reduction adjustments have been utilized to meet ALARA standards for radiation dose reduction. COMPARISON: None. FINDINGS: There is no evidence of fracture or dislocation. There is tricompartment degenerative change characterized by osteophytic spurring and joint space loss with some early subchondral cystic change involving the lateral femorotibial joint. There is no significant joint effusion. No surrounding soft tissue mass.     1.  No evidence of fracture. 2.  Tricompartment degenerative change.    Dx-chest-for Picc Line Perform Procedure In: Picc Room    Result Date: 11/15/2017  11/15/2017 12:16 PM HISTORY/REASON FOR EXAM:  PICC  line placement. IV access necessity. TECHNIQUE/EXAM DESCRIPTION AND NUMBER OF VIEWS: Single AP view of the chest. COMPARISON: 11/10/2017 FINDINGS: There is interval placement of a left arm PICC line with its tip projecting over the SVC in what appears to be satisfactory position. Increased interstitial opacity is present diffusely which represent underlying fibrotic change or minor interstitial edema. No lobar consolidation is present. The heart is at the upper limits of normal in size. There is no pleural effusion. Bony structures and soft tissues are unremarkable.     Interval placement of a left arm PICC line which projects in satisfactory position.    Dx-chest-limited (1 View)    Result Date: 11/20/2017 11/20/2017 2:57 PM HISTORY/REASON FOR EXAM:  Chest Pain. Left PICC line placement TECHNIQUE/EXAM DESCRIPTION AND NUMBER OF VIEWS: Single AP view of the chest. COMPARISON:  1 view chest 11/15/2017 FINDINGS: Left PICC line is present. The tip projects in the SVC.. The lungs are clear. Cardiac Silhouette: enlarged. Pleura: There are no pleural effusion or pneumothoraces. Osseous structures: No significant bony abnormality is present.     Left PICC line is present and appears appropriately located    Dx-chest-portable (1 View)    Result Date: 11/10/2017  11/10/2017 3:43 PM HISTORY/REASON FOR EXAM:  Ground-level fall. Weakness. Shortness of breath. TECHNIQUE/EXAM DESCRIPTION AND NUMBER OF VIEWS: Single portable view of the chest. COMPARISON: None FINDINGS: No pneumothorax identified. There is no evidence of focal consolidation or evidence of pulmonary edema. There is no evidence of pleural effusion. The heart is normal in size.     No pneumothorax identified.    Dx-foot-complete 3+ Right    Result Date: 11/10/2017  11/10/2017 3:43 PM HISTORY/REASON FOR EXAM:  Right great toe pain. Open wound in the right great toe region. TECHNIQUE/EXAM DESCRIPTION AND NUMBER OF VIEWS: 3 views of the RIGHT foot. COMPARISON:  None.  FINDINGS: There is subcutaneous air or gas in the plantar aspect of the right 1st toe region distally consistent with the open wound. No underlying bony lytic or blastic change is noted to suggest osteomyelitis. There is flexion deformity and lateral deviation of the right 2nd through 5th MTP joints. There is no evidence of displaced fracture or dislocation.     1.  Soft tissue gas consistent with open wound in the plantar aspect of the distal medial aspect of the right 1st toe. 2.  No plain film evidence of osteomyelitis.    Dx-knee Complete 4+ Left    Result Date: 11/23/2017 11/23/2017 11:14 AM HISTORY/REASON FOR EXAM: Left knee pain. Ground-level fall. TECHNIQUE/EXAM DESCRIPTION AND NUMBER OF VIEWS: 4 views of the LEFT knee. COMPARISON: None FINDINGS: Bone density is normal. There is no evidence of fracture or dislocation. There is tricompartment degenerative change characterized by osteophytic spurring and mild joint space loss. There is no joint effusion.     1.  No evidence of acute fracture or dislocation. 2.  Mild tricompartment degenerative change.    Mr-knee-with & W/o Left    Result Date: 11/26/2017 11/25/2017 5:14 PM HISTORY/REASON FOR EXAM:  Knee pain no trauma Left knee pain. severe tenderness to palpation on lateral aspect of patella. Eval for ligament or tendon pathology. R/o infectious process TECHNIQUE/EXAM DESCRIPTION: MRI of the LEFT knee without and with contrast. The study was performed on a [employer] 1.5 Taya MRI scanner. T1 coronal, proton density fat-suppressed coronal, fast spin-echo T2 fat-saturated axial, intermediate fast spin-echo sagittal, and intermediate fast spin-echo fat-suppressed thin-section 3D sagittal images were obtained. Additional fat-suppressed intravenous gadolinium-enhanced T1 sequences in the axial and sagittal planes were obtained. 20 mL Omniscan contrast was administered intravenously. COMPARISON: CT left knee 11/24/2017. FINDINGS: No abnormal enhancement. Joint  effusion/Baker's cyst: No joint effusion.. Medial meniscus: Complex tear of the body and posterior horn. Lateral meniscus: Degenerative tear of the anterior horn.. Anterior cruciate ligament: Poorly seen, may be degenerated. Posterior cruciate ligament: Intact. Medial collateral ligament: Intact. Lateral collateral stabilizing complex: Intact. Extensor mechanism: Intact. Patellofemoral compartment: Diffuse cartilage thinning. Fissure in the lateral patellar facet. There is a focal area of increased signal in the superolateral aspect of Hoffa fat pad . Medial compartment: Diffuse cartilage thinning. No full-thickness cartilage loss. Moderate marginal osteophyte. Lateral compartment: Severe cartilage thinning. Moderate marginal osteophyte. Bone marrow: No acute fracture. Small enchondroma in the fibular head.     1. No acute fracture. No abnormal enhancement of the collection. 2. Moderate tricompartmental osteoarthritis, most in the lateral compartment. 3. Complex tear of the body and posterior horn medial meniscus. 4. Degenerative tearing of the anterior horn lateral meniscus. 5. Increased signal in the superolateral aspect of Hoffa fat pad , may represent patellar tendon-lateral femoral condyle friction syndrome. 6. Poorly visualized anterior cruciate ligament, may be due to degeneration.    Ir-picc Line Placement > Age 5    Result Date: 11/15/2017  HISTORY/REASON FOR EXAM:   PICC placement. TECHNIQUE/EXAM DESCRIPTION AND NUMBER OF VIEWS:   PICC line insertion with ultrasound guidance.  The procedure was performed using maximal sterile barrier technique including sterile gown, mask, cap, and donning of sterile gloves following appropriate hand hygiene and/or sterile scrub. Patient skin site was prepped with 2% Chlorhexidine solution. FINDINGS:  PICC line insertion with Ultrasound Guidance was performed by qualified nursing staff without the assistance of a Radiologist.  A follow up chest x-ray will be performed to  determine appropriateness of PICC positioning.              Ultrasound-guided PICC placement performed by qualified nursing staff as above.     Le Venous Duplex - Dvt ([facility] Only)    Result Date: 2017   Vascular Laboratory  CONCLUSIONS  Technically difficult examination; however, no obvious thrombosis is seen  in either the deep or superficial venous system of the left lower  extremity.  EMELY FLORES  Exam Date:     2017 14:55  Room #:     Inpatient  Priority:     Routine  Ht (in):             Wt (lb):  Ordering Physician:        RODO JEROME  Referring Physician:       QUEENIE Abrams  Sonographer:               Ji Almeida  Study Type:                Complete Unilateral  Technical Quality:         Adequate  Age:    52    Gender:     M  MRN:    [id number]  :    1965      BSA:  Indications:     Edema  CPT Codes:       16984  ICD Codes:       782.3  History:         Left lower extremity swelling.  Limitations:  PROCEDURES:  Left lower extremity venous duplex imaging.  The following venous structures were evaluated: common femoral, profunda  femoral, greater saphenous, femoral, popliteal , peroneal and posterior  tibial veins.  Serial compression, augmentation maneuvers,  color and spectral Doppler  flow evaluations were performed.  FINDINGS:  Left lower extremity -.  The peroneal and posterior tibial veins are difficult to assess for  compressibility, but flow response to augmentation is demonstrated.  Complete color filling and compressibility with normal venous flow dynamics  including spontaneous flow, response to augmentation maneuvers, and  respiratory phasicity.  No evidence of superficial or deep venous thrombosis.  Tala Sanchez M.D.  (Electronically Signed)  Final Date:      2017                   19:04    [facility]    Result Date: 2017   Vascular Laboratory  Conclusions  No prior study is available for comparison.  Bilateral.  No evidence of  arterial insufficiency.  EMELY FLORES  Age:    52    Gender:     M  MRN:    [id number]  :    1965      BSA:  Exam Date:     2017 10:46  Room #:     Inpatient  Priority:     Routine  Ht (in):             Wt (lb):  Ordering Physician:        MORIS REEVES  Referring Physician:       MORIS REEVES  Sonographer:               Ji Almeida  Study Type:                Complete Bilateral  Technical Quality:         Adequate  Indications:     Diabetes  CPT Codes:       99065  ICD Codes:       250  History:         Status/post amputation of gangrenous right great toe.  Limitations:                 RIGHT  Waveform            Systolic BPs (mmHg)                             145           Brachial  Triphasic                                Common Femoral  Triphasic                  160           Posterior Tibial  Triphasic                  165           Dorsalis Pedis                                           Peroneal                             1.09          SHAYAN                                           TBI                       LEFT  Waveform        Systolic BPs (mmHg)                             152           Brachial  Triphasic                                Common Femoral  Triphasic                  175           Posterior Tibial  Triphasic                  148           Dorsalis Pedis                                           Peroneal                             1.15          SHAYAN                                           TBI  Findings  Bilateral:  Doppler waveforms of the common femoral artery are of high amplitude and  triphasic.  Doppler waveforms at the ankle are brisk and triphasic.  Ankle-brachial indices are normal.  Victor M Villalobos M.D.  (Electronically Signed)  Final Date:      2017                   22:51      Micro:  Results     ** No results found for the last 168 hours. **          Assessment:  Active Hospital  Problems    Diagnosis   • *Left knee pain [M25.562]   • Gangrene of toe of right foot, s/p recent amputation (CMS-HCC) [I96]   • Failure to thrive in adult [R62.7]   • Type 2 diabetes mellitus (CMS-HCC) [E11.9]   • Hypertension [I10]   • Obesity [E66.9]       Plan:  Gangrene of toe of right foot, s/p recent amputation (CMS-HCC)- (present on admission)  Afebrile  No leukocytosis  Amputation site healing slowly  Plan was for IV ertapenem through Dec 22  Switched to Zosyn while in the hosp  Sutures to be removed 12/1.   Pt already scheduled with LPS rounds at outpt wound clinic on 12/1.     Torn medial and lateral meniscus, left knee  due to ground level fall  Contributing factors morbid obesity, recent surgery, DM, and physical deconditioning   Agree with plan for rehab  Fall precautions      Type 2 diabetes mellitus  Keep BS under 150 to help control current infection  Last glyco Hgb 9.2    Discussed with internal medicine.

## 2017-11-30 NOTE — DISCHARGE PLANNING
Updated Clinical note: via chart review  IV abx-ID recommends Ertapenem until 12/22 but Zosyn while inhouse.  Goal is to keep BS under 150    Update Discharge Planning:  Currently looking for SNF to accept pt but barrier is that pt is Medicaid pending.     Addendum:  Per [facility], working on [facility] .    Discussed with IDT :  Update by Sw: waiting for [facility] for [facility]. They have accepted the pt.

## 2017-11-30 NOTE — CARE PLAN
Problem: Knowledge Deficit  Goal: Knowledge of disease process/condition, treatment plan, diagnostic tests, and medications will improve  Outcome: PROGRESSING AS EXPECTED  Education provided. Tests, labs, medications reviewed. Questions answered.

## 2017-11-30 NOTE — THERAPY
"Pt agreeable to tx today. Pt unable to amb further today. Pt amb slowly w/ a shuffled gait and decreased heel-toe off requiring verbal cuing. Pt receptive. Pt requiring extra verbal cuing for making turns w/o LOB. Pt tends to  FWW when making turns which causes a LOB. Pt educated about importance of keeping walker on ground during turns. Pt receptive to education. Pt tolerated standing balance and amb better today. Pt able to march in place in standing. Pt demonstrated difficulty wt shifting due to pain. Pt unable to clear LEs well enough to ascend/descend stairs for stair training. Pt educated about importance of ability to be able to clear LEs enough. Pt aware of deficits. Pt educated about importance of ice and elevating L LE to reduce swelling and pain. Pt receptive. Pt would benefit from further acute PT txs to progress towards goals and indpendence.    Physical Therapy Treatment completed.   Bed Mobility:  Supine to Sit: Modified Independent  Transfers: Sit to Stand: Supervised  Gait: Level Of Assist: Contact Guard Assist with Front-Wheel Walker       Plan of Care: Will benefit from Physical Therapy 5 times per week       See \"Rehab Therapy-Acute\" Patient Summary Report for complete documentation.       "

## 2017-11-30 NOTE — PROGRESS NOTES
[facility]ist Progress Note    Date of Service: 2017    Chief Complaint  52 y.o. Morbidly obese male with PMH of gangrene of right great toe s/p amputation 11/10/17 admitted 2017 with failure to thrive and left knee pain    Interval Problem Update  Patient seen and evaluated on rounds.   Improved knee pain / ambulation with NSAIDs  Monitor BMP while using NSAIDs  Anti HTN regimen titrated. Added HCTZ / Carvedilol.     Consultants/Specialty  Orthopedics - Dr Sanchez  Infectious disease - Dr Holley    Disposition  Continue PT / OT  Post acute placement being considered      Review of Systems   Constitutional: Positive for malaise/fatigue. Negative for chills and fever.   HENT: Negative for hearing loss and tinnitus.    Eyes: Negative for blurred vision and double vision.   Respiratory: Negative for cough, hemoptysis, sputum production, shortness of breath and wheezing.    Cardiovascular: Positive for leg swelling. Negative for chest pain.   Gastrointestinal: Positive for nausea. Negative for abdominal pain, blood in stool, constipation, diarrhea, heartburn, melena and vomiting.   Genitourinary: Negative for dysuria, flank pain, frequency, hematuria and urgency.   Musculoskeletal: Positive for falls and joint pain.   Skin: Negative for itching and rash.   Neurological: Positive for weakness. Negative for dizziness and headaches.   Psychiatric/Behavioral: Negative for depression, hallucinations, substance abuse and suicidal ideas.   All other systems reviewed and are negative.     Physical Exam  Laboratory/Imaging   Hemodynamics  Temp (24hrs), Av.5 °C (97.7 °F), Min:36.2 °C (97.1 °F), Max:36.8 °C (98.2 °F)   Temperature: 36.8 °C (98.2 °F)  Pulse  Av.7  Min: 60  Max: 109    Blood Pressure: (!) 174/91      Respiratory      Respiration: 16, Pulse Oximetry: 98 %        RUL Breath Sounds: Clear, RML Breath Sounds: Clear, RLL Breath Sounds: Clear, ELIAS Breath Sounds: Clear, LLL Breath Sounds:  Clear    Fluids    Intake/Output Summary (Last 24 hours) at 11/30/17 0907  Last data filed at 11/30/17 0400   Gross per 24 hour   Intake                0 ml   Output              600 ml   Net             -600 ml       Nutrition  Orders Placed This Encounter   Procedures   • Diet Order     Standing Status:   Standing     Number of Occurrences:   1     Order Specific Question:   Diet:     Answer:   Diabetic [3]     Physical Exam   Constitutional: He is oriented to person, place, and time. He appears well-developed and well-nourished. No distress.   Morbidly Obese   HENT:   Head: Normocephalic.   Mouth/Throat: Oropharynx is clear and moist. No oropharyngeal exudate.   Eyes: Conjunctivae are normal. Pupils are equal, round, and reactive to light. No scleral icterus.   Neck: Neck supple. No JVD present.   Cardiovascular: Normal rate, regular rhythm and normal heart sounds.  Exam reveals no gallop and no friction rub.    No murmur heard.  Pulmonary/Chest: Effort normal and breath sounds normal. No stridor. No respiratory distress. He has no wheezes. He has no rales.   Abdominal: Soft. Bowel sounds are normal. He exhibits no distension. There is no tenderness. There is no rebound and no guarding.   Musculoskeletal: He exhibits edema (> LLE) and tenderness (left knee. ). He exhibits no deformity.   Limited ROM of left knee due to pain.     R great toe amputation wound with some dried blood no purulent drainage noted       Neurological: He is alert and oriented to person, place, and time. No cranial nerve deficit.   strength and sensation intact   Skin: Skin is warm and dry. He is not diaphoretic. No erythema.   R toe amputation. Post surgically no drainage is noted. Good healing is noted.    Psychiatric: He has a normal mood and affect. His behavior is normal. Judgment and thought content normal.   Nursing note and vitals reviewed.      Recent Labs      11/28/17   0415  11/29/17   0600   WBC  5.7  6.6   RBC  5.17  5.25  "  HEMOGLOBIN  14.3  14.8   HEMATOCRIT  43.3  43.8   MCV  83.8  83.4   MCH  27.7  28.2   MCHC  33.0*  33.8   RDW  41.1  40.6   PLATELETCT  215  245   MPV  10.7  10.3     Recent Labs      11/28/17   0415  11/29/17   0600  11/30/17   0431   SODIUM  133*  134*  132*   POTASSIUM  3.8  3.4*  4.0   CHLORIDE  99  98  99   CO2  27  27  27   GLUCOSE  146*  121*  128*   BUN  15  11  18   CREATININE  0.46*  0.37*  0.49*   CALCIUM  8.9  8.9  9.2             Recent Labs      11/29/17   0600   TRIGLYCERIDE  153*   HDL  32*   LDL  86          Assessment/Plan     * Left knee pain- (present on admission)   Assessment & Plan    MRI reveals \"Moderate tricompartmental osteoarthritis, most in the lateral compartment. Complex tear of the body and posterior horn medial meniscus. Degenerative tearing of the anterior horn lateral meniscus. Increased signal in the superolateral aspect of Hoffa fat pad , may represent patellar tendon-lateral femoral condyle friction syndrome\"    Orthopedics recommend no surgical intervention  Continue pain control. PT/OT  DVT study negative  Do not recommend opioids  Continue IV toradol scheduled for pain control / Inflammation control. Pepcid while on NSAIDS  Transition to PO NSAIDs tomorrow  Ice treatment  Weight loss, Body mass index is 43.35 kg/m².        Gangrene of toe of right foot, s/p recent amputation (CMS-[facility])- (present on admission)   Assessment & Plan    S/p Right Great toe amputation, through MTP joint  with Dr Leo on 11/10/2017  Cx with proteus and strep  ID team has recommended IV abx through 12/22/2017  LPS consult  Transitioned to zosyn per ID team  ID team on board  DM control        Failure to thrive in adult- (present on admission)   Assessment & Plan    Recurrent hospitilizations  SW looking into disposition  Post acute placement as clinically appropriate  Continue PT/OT        Hypertension- (present on admission)   Assessment & Plan    Uncontrolled  Amlodipine 10 mg  Lisinopril 40 " mg  Add HCTZ 25 mg / Carvedilol low dose  Titrate to achieve normotensive control, Aim SBP < 130 with underlying DMII        Type 2 diabetes mellitus (CMS-HCC)- (present on admission)   Assessment & Plan    Uncontrolled. Recent A1C of 9.2. No apparent manifestations.   Check urine microalbumin - pending.  ASA 81  Pravastatin 20 mg   Holding oral hypoglycemic agents during hospital stay.   SSI  Titrate insulin to achieve normoglycemic control        Obesity- (present on admission)   Assessment & Plan    Morbid  Body mass index is 43.35 kg/m².            Reviewed items::  Labs reviewed, Medications reviewed and Radiology images reviewed  Zabala catheter::  No Zabala  DVT prophylaxis pharmacological::  Heparin  DVT prophylaxis - mechanical:  SCDs  Antibiotics:  Treating active infection/contamination beyond 24 hours perioperative coverage

## 2017-12-01 ENCOUNTER — APPOINTMENT (OUTPATIENT)
Dept: ONCOLOGY | Facility: MEDICAL CENTER | Age: 52
End: 2017-12-01
Attending: INTERNAL MEDICINE
Payer: MEDICAID

## 2017-12-01 ENCOUNTER — APPOINTMENT (OUTPATIENT)
Dept: WOUND CARE | Facility: MEDICAL CENTER | Age: 52
End: 2017-12-01
Attending: ORTHOPAEDIC SURGERY

## 2017-12-01 VITALS
TEMPERATURE: 97.1 F | DIASTOLIC BLOOD PRESSURE: 70 MMHG | WEIGHT: 315 LBS | RESPIRATION RATE: 18 BRPM | HEART RATE: 65 BPM | BODY MASS INDEX: 43.35 KG/M2 | OXYGEN SATURATION: 94 % | SYSTOLIC BLOOD PRESSURE: 146 MMHG

## 2017-12-01 LAB
ANION GAP SERPL CALC-SCNC: 8 MMOL/L (ref 0–11.9)
BUN SERPL-MCNC: 16 MG/DL (ref 8–22)
CALCIUM SERPL-MCNC: 9.3 MG/DL (ref 8.5–10.5)
CHLORIDE SERPL-SCNC: 99 MMOL/L (ref 96–112)
CO2 SERPL-SCNC: 26 MMOL/L (ref 20–33)
CREAT SERPL-MCNC: 0.53 MG/DL (ref 0.5–1.4)
GFR SERPL CREATININE-BSD FRML MDRD: >60 ML/MIN/1.73 M 2
GLUCOSE BLD-MCNC: 129 MG/DL (ref 65–99)
GLUCOSE BLD-MCNC: 137 MG/DL (ref 65–99)
GLUCOSE BLD-MCNC: 155 MG/DL (ref 65–99)
GLUCOSE SERPL-MCNC: 144 MG/DL (ref 65–99)
POTASSIUM SERPL-SCNC: 3.8 MMOL/L (ref 3.6–5.5)
SODIUM SERPL-SCNC: 133 MMOL/L (ref 135–145)

## 2017-12-01 PROCEDURE — 99239 HOSP IP/OBS DSCHRG MGMT >30: CPT | Performed by: INTERNAL MEDICINE

## 2017-12-01 PROCEDURE — 82962 GLUCOSE BLOOD TEST: CPT | Mod: 91

## 2017-12-01 PROCEDURE — 97530 THERAPEUTIC ACTIVITIES: CPT

## 2017-12-01 PROCEDURE — 700111 HCHG RX REV CODE 636 W/ 250 OVERRIDE (IP): Performed by: INTERNAL MEDICINE

## 2017-12-01 PROCEDURE — 80048 BASIC METABOLIC PNL TOTAL CA: CPT

## 2017-12-01 PROCEDURE — A9270 NON-COVERED ITEM OR SERVICE: HCPCS | Performed by: INTERNAL MEDICINE

## 2017-12-01 PROCEDURE — 700102 HCHG RX REV CODE 250 W/ 637 OVERRIDE(OP): Performed by: INTERNAL MEDICINE

## 2017-12-01 PROCEDURE — 97116 GAIT TRAINING THERAPY: CPT

## 2017-12-01 RX ORDER — POTASSIUM CHLORIDE 20 MEQ/1
20 TABLET, EXTENDED RELEASE ORAL DAILY
Status: DISCONTINUED | OUTPATIENT
Start: 2017-12-01 | End: 2017-12-01 | Stop reason: HOSPADM

## 2017-12-01 RX ORDER — LISINOPRIL 40 MG/1
40 TABLET ORAL DAILY
Qty: 30 TAB
Start: 2017-12-01 | End: 2018-03-08 | Stop reason: SDUPTHER

## 2017-12-01 RX ORDER — CELECOXIB 200 MG/1
200 CAPSULE ORAL 2 TIMES DAILY
Status: DISCONTINUED | OUTPATIENT
Start: 2017-12-01 | End: 2017-12-01 | Stop reason: HOSPADM

## 2017-12-01 RX ORDER — CELECOXIB 200 MG/1
200 CAPSULE ORAL 2 TIMES DAILY
Qty: 60 CAP | Status: ON HOLD
Start: 2017-12-01 | End: 2018-01-29

## 2017-12-01 RX ORDER — ONDANSETRON 4 MG/1
4 TABLET, ORALLY DISINTEGRATING ORAL EVERY 4 HOURS PRN
Qty: 10 TAB | Refills: 0
Start: 2017-12-01 | End: 2018-01-15

## 2017-12-01 RX ORDER — POLYETHYLENE GLYCOL 3350 17 G/17G
17 POWDER, FOR SOLUTION ORAL 2 TIMES DAILY
Start: 2017-12-01 | End: 2018-01-09

## 2017-12-01 RX ORDER — HEPARIN SODIUM 5000 [USP'U]/ML
5000 INJECTION, SOLUTION INTRAVENOUS; SUBCUTANEOUS EVERY 8 HOURS
Refills: 0
Start: 2017-12-01 | End: 2018-01-09

## 2017-12-01 RX ORDER — CARVEDILOL 6.25 MG/1
3.12 TABLET ORAL 2 TIMES DAILY WITH MEALS
Status: ON HOLD
Start: 2017-12-01 | End: 2018-01-29

## 2017-12-01 RX ORDER — ACETAMINOPHEN 325 MG/1
1000 TABLET ORAL EVERY 6 HOURS PRN
Qty: 30 TAB | Refills: 0
Start: 2017-12-01 | End: 2018-01-25

## 2017-12-01 RX ORDER — HYDROCHLOROTHIAZIDE 25 MG/1
25 TABLET ORAL DAILY
Qty: 30 TAB
Start: 2017-12-02 | End: 2018-01-15

## 2017-12-01 RX ORDER — PRAVASTATIN SODIUM 20 MG
20 TABLET ORAL EVERY EVENING
Qty: 30 TAB | Refills: 11
Start: 2017-12-01 | End: 2018-03-08 | Stop reason: SDUPTHER

## 2017-12-01 RX ORDER — AMLODIPINE BESYLATE 10 MG/1
10 TABLET ORAL DAILY
Qty: 30 TAB | Status: ON HOLD
Start: 2017-12-01 | End: 2018-01-24

## 2017-12-01 RX ORDER — PROMETHAZINE HYDROCHLORIDE 25 MG/1
25 TABLET ORAL EVERY 6 HOURS PRN
Qty: 30 TAB | Refills: 0
Start: 2017-12-01 | End: 2018-01-09

## 2017-12-01 RX ORDER — FAMOTIDINE 20 MG/1
20 TABLET, FILM COATED ORAL 2 TIMES DAILY
Qty: 60 TAB
Start: 2017-12-01 | End: 2018-01-15

## 2017-12-01 RX ORDER — ASPIRIN 81 MG/1
81 TABLET ORAL DAILY
Qty: 30 TAB
Start: 2017-12-02 | End: 2018-03-08 | Stop reason: SDUPTHER

## 2017-12-01 RX ORDER — POTASSIUM CHLORIDE 20 MEQ/1
20 TABLET, EXTENDED RELEASE ORAL DAILY
Qty: 60 TAB | Refills: 11
Start: 2017-12-02 | End: 2018-01-15

## 2017-12-01 RX ADMIN — HYDROCHLOROTHIAZIDE 25 MG: 25 TABLET ORAL at 09:18

## 2017-12-01 RX ADMIN — CELECOXIB 200 MG: 200 CAPSULE ORAL at 09:17

## 2017-12-01 RX ADMIN — LABETALOL HYDROCHLORIDE 10 MG: 5 INJECTION, SOLUTION INTRAVENOUS at 04:47

## 2017-12-01 RX ADMIN — ACETAMINOPHEN 975 MG: 325 TABLET, FILM COATED ORAL at 11:02

## 2017-12-01 RX ADMIN — CARVEDILOL 3.12 MG: 3.12 TABLET, FILM COATED ORAL at 09:18

## 2017-12-01 RX ADMIN — ASPIRIN 81 MG: 81 TABLET, COATED ORAL at 09:18

## 2017-12-01 RX ADMIN — HEPARIN SODIUM 5000 UNITS: 5000 INJECTION, SOLUTION INTRAVENOUS; SUBCUTANEOUS at 05:57

## 2017-12-01 RX ADMIN — AMLODIPINE BESYLATE 10 MG: 10 TABLET ORAL at 09:18

## 2017-12-01 RX ADMIN — TAZOBACTAM SODIUM AND PIPERACILLIN SODIUM 3.38 G: 375; 3 INJECTION, SOLUTION INTRAVENOUS at 13:30

## 2017-12-01 RX ADMIN — FAMOTIDINE 20 MG: 20 TABLET, FILM COATED ORAL at 09:18

## 2017-12-01 RX ADMIN — KETOROLAC TROMETHAMINE 30 MG: 30 INJECTION, SOLUTION INTRAMUSCULAR at 05:57

## 2017-12-01 RX ADMIN — ACETAMINOPHEN 975 MG: 325 TABLET, FILM COATED ORAL at 04:51

## 2017-12-01 RX ADMIN — TAZOBACTAM SODIUM AND PIPERACILLIN SODIUM 3.38 G: 375; 3 INJECTION, SOLUTION INTRAVENOUS at 05:57

## 2017-12-01 RX ADMIN — LISINOPRIL 40 MG: 20 TABLET ORAL at 09:17

## 2017-12-01 RX ADMIN — HEPARIN SODIUM 5000 UNITS: 5000 INJECTION, SOLUTION INTRAVENOUS; SUBCUTANEOUS at 15:10

## 2017-12-01 RX ADMIN — INSULIN HUMAN 1 UNITS: 100 INJECTION, SOLUTION PARENTERAL at 13:30

## 2017-12-01 RX ADMIN — POTASSIUM CHLORIDE 20 MEQ: 1500 TABLET, EXTENDED RELEASE ORAL at 09:18

## 2017-12-01 ASSESSMENT — ENCOUNTER SYMPTOMS
HEARTBURN: 0
COUGH: 0
ABDOMINAL PAIN: 0
BLURRED VISION: 0
DEPRESSION: 0
HALLUCINATIONS: 0
CHILLS: 0
BLOOD IN STOOL: 0
DIARRHEA: 0
HEADACHES: 0
CONSTIPATION: 0
HEMOPTYSIS: 0
WEAKNESS: 0
FLANK PAIN: 0
NAUSEA: 0
WHEEZING: 0
DOUBLE VISION: 0
SPUTUM PRODUCTION: 0
DIZZINESS: 0
FEVER: 0
VOMITING: 0
FALLS: 0
SHORTNESS OF BREATH: 0

## 2017-12-01 ASSESSMENT — COGNITIVE AND FUNCTIONAL STATUS - GENERAL
SUGGESTED CMS G CODE MODIFIER MOBILITY: CK
WALKING IN HOSPITAL ROOM: A LITTLE
MOBILITY SCORE: 19
MOVING FROM LYING ON BACK TO SITTING ON SIDE OF FLAT BED: A LITTLE
STANDING UP FROM CHAIR USING ARMS: A LITTLE
CLIMB 3 TO 5 STEPS WITH RAILING: A LOT

## 2017-12-01 ASSESSMENT — PAIN SCALES - GENERAL
PAINLEVEL_OUTOF10: 7
PAINLEVEL_OUTOF10: 5
PAINLEVEL_OUTOF10: 6
PAINLEVEL_OUTOF10: 5
PAINLEVEL_OUTOF10: 8

## 2017-12-01 ASSESSMENT — GAIT ASSESSMENTS
DEVIATION: ANTALGIC;BRADYKINETIC;DECREASED HEEL STRIKE;DECREASED TOE OFF
DISTANCE (FEET): 175
GAIT LEVEL OF ASSIST: CONTACT GUARD ASSIST
ASSISTIVE DEVICE: FRONT WHEEL WALKER

## 2017-12-01 ASSESSMENT — PAIN SCALES - WONG BAKER: WONGBAKER_NUMERICALRESPONSE: DOESN'T HURT AT ALL

## 2017-12-01 ASSESSMENT — LIFESTYLE VARIABLES: SUBSTANCE_ABUSE: 0

## 2017-12-01 NOTE — PROGRESS NOTES
"Infectious Disease Progress Note    Author: Cristy Holley M.D. Date & Time of service: 2017  2:33 PM    Chief Complaint:  OM    Interval History:  52 y.o. WM  admitted 2017 after ground level fall.  Known to ID from recent hospitalization for gangrene and OM of the right toe    AF pain much improved-denies SE abx   AF no CBC has been ambulating-feels better but knee still \"wants to give out\"  Labs Reviewed, Medications Reviewed, Radiology Reviewed and Wound Reviewed.    Review of Systems:  Review of Systems   Constitutional: Negative for chills and fever.   Gastrointestinal: Negative for abdominal pain, diarrhea, nausea and vomiting.   Musculoskeletal: Positive for joint pain.   All other systems reviewed and are negative.      Hemodynamics:  Temp (24hrs), Av.4 °C (97.6 °F), Min:36.1 °C (97 °F), Max:36.6 °C (97.8 °F)  Temperature: 36.6 °C (97.8 °F)  Pulse  Av.4  Min: 59  Max: 120   Blood Pressure: 140/80       Physical Exam:  Physical Exam   Constitutional: He appears well-developed. No distress.   Obese   HENT:   Head: Normocephalic and atraumatic.   Eyes: EOM are normal. Pupils are equal, round, and reactive to light.   Neck: Neck supple.   Cardiovascular: Normal rate.    Pulmonary/Chest: Effort normal. No respiratory distress.   Abdominal: Soft. He exhibits no distension. There is no tenderness.   Musculoskeletal: He exhibits edema.   LUE PICC notender  Left knee wrapped  Right great toe amp site medial eschar-sutures out   Neurological: He is alert.   Skin: No rash noted.   Nursing note and vitals reviewed.      Meds:    Current Facility-Administered Medications:   •  potassium chloride SA  •  celecoxib  •  hydrochlorothiazide  •  carvedilol  •  lisinopril  •  pravastatin  •  aspirin EC  •  [COMPLETED] piperacillin-tazobactam **AND** piperacillin-tazobactam  •  diphenhydrAMINE **OR** [DISCONTINUED] diphenhydrAMINE  •  amlodipine  •  labetalol  •  polyethylene glycol/lytes  •  " acetaminophen  •  famotidine  •  promethazine  •  Respiratory Care per Protocol  •  heparin  •  ondansetron  •  ondansetron  •  insulin regular **AND** Accu-Chek ACHS **AND** NOTIFY MD and PharmD **AND** glucose 4 g **AND** dextrose 50%    Labs:  Recent Labs      11/29/17   0600   WBC  6.6   RBC  5.25   HEMOGLOBIN  14.8   HEMATOCRIT  43.8   MCV  83.4   MCH  28.2   RDW  40.6   PLATELETCT  245   MPV  10.3   NEUTSPOLYS  57.70   LYMPHOCYTES  30.00   MONOCYTES  7.20   EOSINOPHILS  4.20   BASOPHILS  0.60     Recent Labs      11/29/17   0600  11/30/17   0431  12/01/17   0440   SODIUM  134*  132*  133*   POTASSIUM  3.4*  4.0  3.8   CHLORIDE  98  99  99   CO2  27  27  26   GLUCOSE  121*  128*  144*   BUN  11  18  16     Recent Labs      11/29/17   0600  11/30/17   0431  12/01/17   0440   CREATININE  0.37*  0.49*  0.53       Imaging:  Ct-knee W/o Plus Recons Left    Result Date: 11/24/2017 11/24/2017 10:05 AM HISTORY/REASON FOR EXAM: Left knee pain after ground-level fall. TECHNIQUE/ EXAM DESCRIPTION AND NUMBER OF VIEWS: CT scan of the LEFT knee without contrast, with reconstructions. Noncontrast thin helical sections were obtained from the distal femur through the proximal tibia/fibula. Sagittal and coronal reconstructions were generated from the axial images. Up to date radiation dose reduction adjustments have been utilized to meet ALARA standards for radiation dose reduction. COMPARISON: None. FINDINGS: There is no evidence of fracture or dislocation. There is tricompartment degenerative change characterized by osteophytic spurring and joint space loss with some early subchondral cystic change involving the lateral femorotibial joint. There is no significant joint effusion. No surrounding soft tissue mass.     1.  No evidence of fracture. 2.  Tricompartment degenerative change.    Dx-chest-for Picc Line Perform Procedure In: Picc Room    Result Date: 11/15/2017  11/15/2017 12:16 PM HISTORY/REASON FOR EXAM:  PICC line  placement. IV access necessity. TECHNIQUE/EXAM DESCRIPTION AND NUMBER OF VIEWS: Single AP view of the chest. COMPARISON: 11/10/2017 FINDINGS: There is interval placement of a left arm PICC line with its tip projecting over the SVC in what appears to be satisfactory position. Increased interstitial opacity is present diffusely which represent underlying fibrotic change or minor interstitial edema. No lobar consolidation is present. The heart is at the upper limits of normal in size. There is no pleural effusion. Bony structures and soft tissues are unremarkable.     Interval placement of a left arm PICC line which projects in satisfactory position.    Dx-chest-limited (1 View)    Result Date: 11/20/2017 11/20/2017 2:57 PM HISTORY/REASON FOR EXAM:  Chest Pain. Left PICC line placement TECHNIQUE/EXAM DESCRIPTION AND NUMBER OF VIEWS: Single AP view of the chest. COMPARISON:  1 view chest 11/15/2017 FINDINGS: Left PICC line is present. The tip projects in the SVC.. The lungs are clear. Cardiac Silhouette: enlarged. Pleura: There are no pleural effusion or pneumothoraces. Osseous structures: No significant bony abnormality is present.     Left PICC line is present and appears appropriately located    Dx-chest-portable (1 View)    Result Date: 11/10/2017  11/10/2017 3:43 PM HISTORY/REASON FOR EXAM:  Ground-level fall. Weakness. Shortness of breath. TECHNIQUE/EXAM DESCRIPTION AND NUMBER OF VIEWS: Single portable view of the chest. COMPARISON: None FINDINGS: No pneumothorax identified. There is no evidence of focal consolidation or evidence of pulmonary edema. There is no evidence of pleural effusion. The heart is normal in size.     No pneumothorax identified.    Dx-foot-complete 3+ Right    Result Date: 11/10/2017  11/10/2017 3:43 PM HISTORY/REASON FOR EXAM:  Right great toe pain. Open wound in the right great toe region. TECHNIQUE/EXAM DESCRIPTION AND NUMBER OF VIEWS: 3 views of the RIGHT foot. COMPARISON:  None.  FINDINGS: There is subcutaneous air or gas in the plantar aspect of the right 1st toe region distally consistent with the open wound. No underlying bony lytic or blastic change is noted to suggest osteomyelitis. There is flexion deformity and lateral deviation of the right 2nd through 5th MTP joints. There is no evidence of displaced fracture or dislocation.     1.  Soft tissue gas consistent with open wound in the plantar aspect of the distal medial aspect of the right 1st toe. 2.  No plain film evidence of osteomyelitis.    Dx-knee Complete 4+ Left    Result Date: 11/23/2017 11/23/2017 11:14 AM HISTORY/REASON FOR EXAM: Left knee pain. Ground-level fall. TECHNIQUE/EXAM DESCRIPTION AND NUMBER OF VIEWS: 4 views of the LEFT knee. COMPARISON: None FINDINGS: Bone density is normal. There is no evidence of fracture or dislocation. There is tricompartment degenerative change characterized by osteophytic spurring and mild joint space loss. There is no joint effusion.     1.  No evidence of acute fracture or dislocation. 2.  Mild tricompartment degenerative change.    Mr-knee-with & W/o Left    Result Date: 11/26/2017 11/25/2017 5:14 PM HISTORY/REASON FOR EXAM:  Knee pain no trauma Left knee pain. severe tenderness to palpation on lateral aspect of patella. Eval for ligament or tendon pathology. R/o infectious process TECHNIQUE/EXAM DESCRIPTION: MRI of the LEFT knee without and with contrast. The study was performed on a KlickExa 1.5 Taya MRI scanner. T1 coronal, proton density fat-suppressed coronal, fast spin-echo T2 fat-saturated axial, intermediate fast spin-echo sagittal, and intermediate fast spin-echo fat-suppressed thin-section 3D sagittal images were obtained. Additional fat-suppressed intravenous gadolinium-enhanced T1 sequences in the axial and sagittal planes were obtained. 20 mL Omniscan contrast was administered intravenously. COMPARISON: CT left knee 11/24/2017. FINDINGS: No abnormal enhancement. Joint  effusion/Baker's cyst: No joint effusion.. Medial meniscus: Complex tear of the body and posterior horn. Lateral meniscus: Degenerative tear of the anterior horn.. Anterior cruciate ligament: Poorly seen, may be degenerated. Posterior cruciate ligament: Intact. Medial collateral ligament: Intact. Lateral collateral stabilizing complex: Intact. Extensor mechanism: Intact. Patellofemoral compartment: Diffuse cartilage thinning. Fissure in the lateral patellar facet. There is a focal area of increased signal in the superolateral aspect of Hoffa fat pad . Medial compartment: Diffuse cartilage thinning. No full-thickness cartilage loss. Moderate marginal osteophyte. Lateral compartment: Severe cartilage thinning. Moderate marginal osteophyte. Bone marrow: No acute fracture. Small enchondroma in the fibular head.     1. No acute fracture. No abnormal enhancement of the collection. 2. Moderate tricompartmental osteoarthritis, most in the lateral compartment. 3. Complex tear of the body and posterior horn medial meniscus. 4. Degenerative tearing of the anterior horn lateral meniscus. 5. Increased signal in the superolateral aspect of Hoffa fat pad , may represent patellar tendon-lateral femoral condyle friction syndrome. 6. Poorly visualized anterior cruciate ligament, may be due to degeneration.    Ir-picc Line Placement > Age 5    Result Date: 11/15/2017  HISTORY/REASON FOR EXAM:   PICC placement. TECHNIQUE/EXAM DESCRIPTION AND NUMBER OF VIEWS:   PICC line insertion with ultrasound guidance.  The procedure was performed using maximal sterile barrier technique including sterile gown, mask, cap, and donning of sterile gloves following appropriate hand hygiene and/or sterile scrub. Patient skin site was prepped with 2% Chlorhexidine solution. FINDINGS:  PICC line insertion with Ultrasound Guidance was performed by qualified nursing staff without the assistance of a Radiologist.  A follow up chest x-ray will be performed to  determine appropriateness of PICC positioning.              Ultrasound-guided PICC placement performed by qualified nursing staff as above.     Le Venous Duplex - Dvt ([facility] Only)    Result Date: 2017   Vascular Laboratory  CONCLUSIONS  Technically difficult examination; however, no obvious thrombosis is seen  in either the deep or superficial venous system of the left lower  extremity.  EMELY FLORES  Exam Date:     2017 14:55  Room #:     Inpatient  Priority:     Routine  Ht (in):             Wt (lb):  Ordering Physician:        RODO JEROME  Referring Physician:       QUEENIE Abrams  Sonographer:               Ji Almeida  Study Type:                Complete Unilateral  Technical Quality:         Adequate  Age:    52    Gender:     M  MRN:    [id number]  :    1965      BSA:  Indications:     Edema  CPT Codes:       44164  ICD Codes:       782.3  History:         Left lower extremity swelling.  Limitations:  PROCEDURES:  Left lower extremity venous duplex imaging.  The following venous structures were evaluated: common femoral, profunda  femoral, greater saphenous, femoral, popliteal , peroneal and posterior  tibial veins.  Serial compression, augmentation maneuvers,  color and spectral Doppler  flow evaluations were performed.  FINDINGS:  Left lower extremity -.  The peroneal and posterior tibial veins are difficult to assess for  compressibility, but flow response to augmentation is demonstrated.  Complete color filling and compressibility with normal venous flow dynamics  including spontaneous flow, response to augmentation maneuvers, and  respiratory phasicity.  No evidence of superficial or deep venous thrombosis.  Tala Sanchez M.D.  (Electronically Signed)  Final Date:      2017                   19:04    [facility]    Result Date: 2017   Vascular Laboratory  Conclusions  No prior study is available for comparison.  Bilateral.  No evidence of  arterial insufficiency.  EMELY FLORES  Age:    52    Gender:     M  MRN:    [id number]  :    1965      BSA:  Exam Date:     2017 10:46  Room #:     Inpatient  Priority:     Routine  Ht (in):             Wt (lb):  Ordering Physician:        MORIS REEVES  Referring Physician:       MORIS REEVES  Sonographer:               Ji Almeida  Study Type:                Complete Bilateral  Technical Quality:         Adequate  Indications:     Diabetes  CPT Codes:       01870  ICD Codes:       250  History:         Status/post amputation of gangrenous right great toe.  Limitations:                 RIGHT  Waveform            Systolic BPs (mmHg)                             145           Brachial  Triphasic                                Common Femoral  Triphasic                  160           Posterior Tibial  Triphasic                  165           Dorsalis Pedis                                           Peroneal                             1.09          SHAYAN                                           TBI                       LEFT  Waveform        Systolic BPs (mmHg)                             152           Brachial  Triphasic                                Common Femoral  Triphasic                  175           Posterior Tibial  Triphasic                  148           Dorsalis Pedis                                           Peroneal                             1.15          SHAYAN                                           TBI  Findings  Bilateral:  Doppler waveforms of the common femoral artery are of high amplitude and  triphasic.  Doppler waveforms at the ankle are brisk and triphasic.  Ankle-brachial indices are normal.  Victor M Villalobos M.D.  (Electronically Signed)  Final Date:      2017                   22:51      Micro:  Results     ** No results found for the last 168 hours. **          Assessment:  Active Hospital  Problems    Diagnosis   • *Left knee pain [M25.562]   • Gangrene of toe of right foot, s/p recent amputation (CMS-HCC) [I96]   • Failure to thrive in adult [R62.7]   • Type 2 diabetes mellitus (CMS-HCC) [E11.9]   • Hypertension [I10]   • Obesity [E66.9]       Plan:  Gangrene of toe of right foot, s/p recent amputation (CMS-HCC)- (present on admission)  Afebrile  No leukocytosis  Amputation site healing slowly  Plan was for IV ertapenem through Dec 22  Continue Zosyn while in the hosp  Sutures removed 12/1.      Torn medial and lateral meniscus, left knee  due to ground level fall  Contributing factors morbid obesity, recent surgery, DM, and physical deconditioning   Agree with plan for rehab  Fall precautions      Type 2 diabetes mellitus  Keep BS under 150 to help control current infection  Last glyco Hgb 9.2    Discussed with internal medicine.

## 2017-12-01 NOTE — THERAPY
"Pt agreeable to tx today. Pt mobility is improving. Pt able to perform 1 x 10 sit<->stands and tolerated well. Pt required verbal and tactile cuing for posture. Pt receptive to cuing. Pt able to amb furthe. Pt cont to amb slowly, w/decreased heel-toe strike, requiring verbal cuing. Pt receptive. Pt tolerated an increase in amb distance. Pt able to march in place, for pre-stair training. Pt cont tohave difficulty clearing L LE and flexing L knee.  Pt required verbal cuing for increase in knee flexion. Pt receptive. Pt able to attempt stepping on a 1\" step and perform 1 x 10 on each LE. Pt demonstrated understanding of pre-stair training. Pt would benefit from further acute PT txs to progress towards goals and indpendence.    Physical Therapy Treatment completed.   Bed Mobility:  Supine to Sit: Modified Independent  Transfers: Sit to Stand: Supervised  Gait: Level Of Assist: Contact Guard Assist with Front-Wheel Walker       Plan of Care: Will benefit from Physical Therapy 5 times per week      See \"Rehab Therapy-Acute\" Patient Summary Report for complete documentation.       "

## 2017-12-01 NOTE — DISCHARGE PLANNING
Approved PATRICIO. Reached out to SW to see if this a traditional PATRICIO or pending medicaid PATRICIO

## 2017-12-01 NOTE — DISCHARGE SUMMARY
C O N F I D E N T I A L I N F O R M A T I O N   -------------------------------------------------------------------------------------------------------------------   DISCHARGE SUMMARY    Patient ID:  Sebastián Castro  [id number]  52 y.o.male  1965    Admit date: 11/23/2017    Discharge date and time: 12/01/17    Admitting Physician: Sathish Patel M.D.    Discharge Physician: Darien Ledesma    CODE STATUS: FULL CODE    Admission Diagnoses: Left knee pain    Discharge Diagnoses:     Left knee pain    Gangrene of toe of right foot, s/p recent amputation (CMS-HCC)    Obesity    Type 2 diabetes mellitus (CMS-HCC)    Hypertension    Failure to thrive in adult      Admission Condition: poor    Discharged Condition: good    Indication for Admission: L knee pain    Hospital Course: This is a 52 male admitted 11/23/2017 with FTT after recurrent admissions to the hospital. Initially admitted 11/11/2017 and discharged 11/21/2017. Was found to have R toe pain swelling. Noted to have a gangrenous toe then for which patient went to the OR with Dr Leo for amputation of R great toe on 11/10/2017. Patient was seen by ID sera and discharged home on Ertapenem with plans to continue this at the infusion center. Patient was readmitted with weakness on 11/22/2017. He was discharged home the same day. Patient was readmitted again on 11/23/2017 because his knee gave out at the [facility]. Patient was again admitted to the hospital. For ongoing left knee pain, swelling MRI of the left knee was obtained and this revealed severe DJD with complex tear of the body and posterior horn of medical meniscus. Degenerative tearing of the anterior horn of lateral meniscus was also seen. This findings were discussed with orthopedics team who evaluated the patient and recommended NSAIDs, weight loss and ongoing PT/OT and no surgical interventions were recommended. Patient was evaluated by PT/OT and ongoing rehab needs were noted. To achieve this patient is  being discharge to SNF now for ongoing rehab needs where he should be engaged in daily PT/OT. He will be maintained on celecoxib. To limit PUD he will also take pepcid. Otherwise ID team was again consulted for recent toe amputation with concerns for underlying osteomyelitis. At this time patient is being transitioned to IV zosyn and patient will continue q 6 hourly zosyn administration at the [facility]. Patient will continue IV abx until 12/22/2017. Prior to stopping abx recommend outpatient f/u with ID or discussing case with ID over the telephone by the physician at the [facility]. In addition patient will need ongoing following up with orthopedics team (Dr Palacios)/ Recommend f/u with orthopedics team in one week of hospital discharge. Otherwise patient with uncontrolled hypertension and anti hypertensive therapy was titrated during the hospital stay. HTN control should be monitored by the physician at the [facility] and ongoing titration performed. For DM he will be on high dose metformin. Patient needs to be engaged in ongoing discussion regarding weight loss. He will also need weekly monitoring of cbc and renal function panel. Discharge planning discussed with patient. Patient is being discharged to SNF in stable condition at this time.     Consults: ID and otherpedic surgery    Significant Studies:   LE VENOUS DUPLEX - DVT ([facility] Only)   Final Result      MR-KNEE-WITH & W/O LEFT   Final Result            1. No acute fracture. No abnormal enhancement of the collection.      2. Moderate tricompartmental osteoarthritis, most in the lateral compartment.      3. Complex tear of the body and posterior horn medial meniscus.      4. Degenerative tearing of the anterior horn lateral meniscus.      5. Increased signal in the superolateral aspect of Hoffa fat pad , may represent patellar tendon-lateral femoral condyle friction syndrome.      6. Poorly visualized anterior cruciate ligament, may be due to degeneration.       CT-KNEE W/O PLUS RECONS LEFT   Final Result      1.  No evidence of fracture.      2.  Tricompartment degenerative change.      DX-KNEE COMPLETE 4+ LEFT   Final Result      1.  No evidence of acute fracture or dislocation.      2.  Mild tricompartment degenerative change.          Procedures Performed While Hospitalized: None    Operations During Hospitalization: None    Disposition: SNF    Patient Instructions: Given  Activity: activity as tolerated  Wound Care: as directed  Diet:   Orders Placed This Encounter   Procedures   • Diet Order     Standing Status:   Standing     Number of Occurrences:   1     Order Specific Question:   Diet:     Answer:   Diabetic [3]          Sebastián Castro   Home Medication Instructions HIRAM:[id number]    Printed on:12/01/17 9521   Medication Information                      acetaminophen (TYLENOL) 325 MG Tab  Take 3 Tabs by mouth every 6 hours as needed.             amlodipine (NORVASC) 10 MG Tab  Take 1 Tab by mouth every day.             aspirin EC 81 MG EC tablet  Take 1 Tab by mouth every day.             carvedilol (COREG) 6.25 MG Tab  Take 0.5 Tabs by mouth 2 times a day, with meals.             celecoxib (CELEBREX) 200 MG Cap  Take 1 Cap by mouth 2 Times a Day.             famotidine (PEPCID) 20 MG Tab  Take 1 Tab by mouth 2 Times a Day.             heparin 5000 UNIT/ML Solution  Inject 1 mL as instructed every 8 hours.             hydrochlorothiazide (HYDRODIURIL) 25 MG Tab  Take 1 Tab by mouth every day.             lisinopril (PRINIVIL, ZESTRIL) 40 MG tablet  Take 1 Tab by mouth every day.             metformin (GLUCOPHAGE) 500 MG Tab  Take 2 Tabs by mouth 2 times a day, with meals.             ondansetron (ZOFRAN ODT) 4 MG TABLET DISPERSIBLE  Take 1 Tab by mouth every four hours as needed (give PO if no IV route available).             piperacillin-tazobactam (ZOSYN) 3-0.375 GM/50ML Solution  50 mL by Intravenous route every 6 hours.             polyethylene glycol/lytes  (MIRALAX) Pack  Take 1 Packet by mouth 2 times a day.             potassium chloride SA (KDUR) 20 MEQ Tab CR  Take 1 Tab by mouth every day.             pravastatin (PRAVACHOL) 20 MG Tab  Take 1 Tab by mouth every evening.             promethazine (PHENERGAN) 25 MG Tab  Take 1 Tab by mouth every 6 hours as needed for Nausea/Vomiting.                 Opioid prescription history checked: N/A. None prescribed.     Time spend preparing discharge: 35 minutes. This included face to face with the patient, medication reconciliation, care co ordination with RN involved in patient care and discussion and co ordination with case management.     Signed:  Darien Ledesma  12/1/2017  3:31 PM

## 2017-12-01 NOTE — PROGRESS NOTES
LIMB PRESERVATION SERVICE     51 y/o male with DM and obesity. Admitted for left knee pain. Known to LPS for R great toe amputation on 11/10/17 by Dr. Leo. Recently discharged on 11/21/17, went to outpt infusion for abx appt the next day and his leg gave out on him. Pt found to have medial meniscus tear and tear to anterior horn of the lateral meniscus. Not requiring surgery at this time.   Pt presents with new ulcer to R 2nd toe. Pt states on the day he was discharged he received a rug burn from the carpet when getting into his chair.      /80   Pulse 79   Temp 36.6 °C (97.8 °F)   Resp 17   Wt (!) 145 kg (319 lb 10.7 oz)   SpO2 96%   BMI 43.35 kg/m²   Pain controlled to foot    +2 DP, +1 PT pulse palpated R foot    R great toe amputation site:   Incision with sutures well approximated   Incisional necrosis to medial aspect ~1/3 of incision. Appears superficial. dry   Measures 5cm  No erythema or edema noted.     R 2nd toe partial thickness ulcer:   No signs of erythema or edema. Measured 1x1.4cm on 11/27. Dry scab intact. Left CHARLEY    R dorsal DTI, POA  Measured 1x0.5cm on 11/27. Dark purple, grey. Left CHARLEY    R dorsal medial ulcer dried bulla.   Measured 1x1.5cm on 11/27. Left CHARLEY      R lateral proximal DTI POA.   Measured 4.5x1.5cm on 11/27. Purple, slightly raised. Skin intact. Left CHARLEY    R lateral distal DTI POA,   Measured 1x1cm on 11/27, purple, dry, skin intact. Left CHARLEY        Has seen DME last admission  Glucose 137 this am    Has offloading shoe at bedside for R foot  Toenails slightly overgrown.     PLAN  Nursing to remove sutures today to R great toe amp  Continue current wound care R foot.   Continue IV abx per ID recs  HWB RLE when OOB wearing Offloading shoe       D/C plan: SNF referrals.   LPS to follow PRN      D/W: pt, RN, Dr. Bernardo

## 2017-12-01 NOTE — DISCHARGE PLANNING
Transport arranged with Jason. Patient will be leaving today @1730 via [facility] going to [facility]. LUNA Addison notified.

## 2017-12-01 NOTE — CARE PLAN
Problem: Pain Management  Goal: Pain level will decrease to patient's comfort goal  Outcome: PROGRESSING AS EXPECTED  Tylenol 975mg PRN Q6 and Toradol 30 mg Q6 given and pain moderately relieved.

## 2017-12-01 NOTE — CARE PLAN
Problem: Safety  Goal: Will remain free from injury  Outcome: PROGRESSING AS EXPECTED  Fall precautions maintained. Patient calls for assistance when OOB. Call light, bedside table, and urinal within reach.

## 2017-12-01 NOTE — ADDENDUM NOTE
Encounter addended by: Kailyn Urena M.D. on: 12/1/2017  7:44 AM<BR>    Actions taken: Delete clinical note

## 2017-12-01 NOTE — PROGRESS NOTES
[facility] Hospitalist Progress Note    Date of Service: 2017    Chief Complaint  52 y.o. Morbidly obese male with PMH of gangrene of right great toe s/p amputation 11/10/17 admitted 2017 with failure to thrive and left knee pain    Interval Problem Update  Patient seen and evaluated on rounds.   Improved knee pain / ambulation with NSAIDs  Transition to PO Celecoxib today  Continue HTN control  Advised ambulation to the patient    Consultants/Specialty  Orthopedics - Dr Sanchez  Infectious disease - Dr Holley    Disposition  Continue PT / OT  Post acute placement being considered      Review of Systems   Constitutional: Negative for chills, fever and malaise/fatigue.   HENT: Negative for hearing loss and tinnitus.    Eyes: Negative for blurred vision and double vision.   Respiratory: Negative for cough, hemoptysis, sputum production, shortness of breath and wheezing.    Cardiovascular: Positive for leg swelling. Negative for chest pain.   Gastrointestinal: Negative for abdominal pain, blood in stool, constipation, diarrhea, heartburn, melena, nausea and vomiting.   Genitourinary: Negative for dysuria, flank pain, frequency, hematuria and urgency.   Musculoskeletal: Positive for joint pain. Negative for falls.   Skin: Negative for itching and rash.   Neurological: Negative for dizziness, weakness and headaches.   Psychiatric/Behavioral: Negative for depression, hallucinations, substance abuse and suicidal ideas.   All other systems reviewed and are negative.     Physical Exam  Laboratory/Imaging   Hemodynamics  Temp (24hrs), Av.4 °C (97.5 °F), Min:36.1 °C (97 °F), Max:36.6 °C (97.8 °F)   Temperature: 36.6 °C (97.8 °F)  Pulse  Av.9  Min: 59  Max: 109    Blood Pressure: (!) 162/69      Respiratory      Respiration: 16, Pulse Oximetry: 94 %     Work Of Breathing / Effort: Mild  RUL Breath Sounds: Clear, RML Breath Sounds: Clear, RLL Breath Sounds: Diminished, ELIAS Breath Sounds: Clear, LLL Breath Sounds:  Diminished    Fluids    Intake/Output Summary (Last 24 hours) at 12/01/17 0832  Last data filed at 12/01/17 0700   Gross per 24 hour   Intake              340 ml   Output             1325 ml   Net             -985 ml       Nutrition  Orders Placed This Encounter   Procedures   • Diet Order     Standing Status:   Standing     Number of Occurrences:   1     Order Specific Question:   Diet:     Answer:   Diabetic [3]     Physical Exam   Constitutional: He is oriented to person, place, and time. He appears well-developed and well-nourished. No distress.   Morbidly Obese   HENT:   Head: Normocephalic.   Mouth/Throat: Oropharynx is clear and moist. No oropharyngeal exudate.   Eyes: Conjunctivae are normal. Pupils are equal, round, and reactive to light. No scleral icterus.   Neck: Neck supple. No JVD present.   Cardiovascular: Normal rate, regular rhythm and normal heart sounds.  Exam reveals no gallop and no friction rub.    No murmur heard.  Pulmonary/Chest: Effort normal and breath sounds normal. No stridor. No respiratory distress. He has no wheezes. He has no rales.   Abdominal: Soft. Bowel sounds are normal. He exhibits no distension. There is no tenderness. There is no rebound and no guarding.   Musculoskeletal: He exhibits edema (> LLE) and tenderness (left knee. ). He exhibits no deformity.   Limited ROM of left knee due to pain.     R great toe amputation wound with some dried blood no purulent drainage noted       Neurological: He is alert and oriented to person, place, and time. No cranial nerve deficit.   strength and sensation intact   Skin: Skin is warm and dry. He is not diaphoretic. No erythema.   R toe amputation. Post surgically no drainage is noted. Good healing is noted.    Psychiatric: He has a normal mood and affect. His behavior is normal. Judgment and thought content normal.   Nursing note and vitals reviewed.      Recent Labs      11/29/17   0600   WBC  6.6   RBC  5.25   HEMOGLOBIN  14.8  "  HEMATOCRIT  43.8   MCV  83.4   MCH  28.2   MCHC  33.8   RDW  40.6   PLATELETCT  245   MPV  10.3     Recent Labs      11/29/17   0600  11/30/17   0431  12/01/17   0440   SODIUM  134*  132*  133*   POTASSIUM  3.4*  4.0  3.8   CHLORIDE  98  99  99   CO2  27  27  26   GLUCOSE  121*  128*  144*   BUN  11  18  16   CREATININE  0.37*  0.49*  0.53   CALCIUM  8.9  9.2  9.3             Recent Labs      11/29/17   0600   TRIGLYCERIDE  153*   HDL  32*   LDL  86          Assessment/Plan     * Left knee pain- (present on admission)   Assessment & Plan    MRI reveals \"Moderate tricompartmental osteoarthritis, most in the lateral compartment. Complex tear of the body and posterior horn medial meniscus. Degenerative tearing of the anterior horn lateral meniscus. Increased signal in the superolateral aspect of Hoffa fat pad , may represent patellar tendon-lateral femoral condyle friction syndrome\"    Orthopedics recommend no surgical intervention  Continue pain control. PT/OT  DVT study negative  Do not recommend opioids  Stop IV toradol. Transition to celecoxib BID.   Continue Pepcid given use of NSAIDs.   Ice treatment  Weight loss, Body mass index is 43.35 kg/m².        Gangrene of toe of right foot, s/p recent amputation (CMS-[facility])- (present on admission)   Assessment & Plan    S/p Right Great toe amputation, through [facility] joint  with Dr Leo on 11/10/2017  Cx with proteus and strep  ID team has recommended IV abx through 12/22/2017  LPS consult  Transitioned to zosyn per ID team  ID team on board  DM control        Failure to thrive in adult- (present on admission)   Assessment & Plan    Recurrent hospitilizations  SW looking into disposition  Post acute placement as clinically appropriate  Continue PT/OT        Hypertension- (present on admission)   Assessment & Plan    Uncontrolled  Amlodipine 10 mg  Lisinopril 40 mg  Continue HCTZ 25 mg / Carvedilol low dose  Discussed with patient  Advised ambulation  Bed bound status is " contributing  Motivated regarding importance of this  Titrate to achieve normotensive control, Aim SBP < 130 with underlying DMII        Type 2 diabetes mellitus (CMS-HCC)- (present on admission)   Assessment & Plan    Uncontrolled. Recent A1C of 9.2. No apparent manifestations.   ASA 81  Pravastatin 20 mg   Holding oral hypoglycemic agents during hospital stay.   SSI  Titrate insulin to achieve normoglycemic control        Obesity- (present on admission)   Assessment & Plan    Morbid  Body mass index is 43.35 kg/m².            Reviewed items::  Labs reviewed, Medications reviewed and Radiology images reviewed  Zabala catheter::  No Zabala  DVT prophylaxis pharmacological::  Heparin  DVT prophylaxis - mechanical:  SCDs  Antibiotics:  Treating active infection/contamination beyond 24 hours perioperative coverage

## 2017-12-01 NOTE — CARE PLAN
Problem: Communication  Goal: The ability to communicate needs accurately and effectively will improve    Intervention: Educate patient and significant other/support system about the plan of care, procedures, treatments, medications and allow for questions  White board updated with [facility] staff and return time.  PT notified of hourly rounding and unit routine.   Appropriate signs in place at doorway for pt.       Problem: Safety  Goal: Will remain free from falls    Intervention: Implement fall precautions  Pt calls appropriately, treaded socks in use. Personal belongings and call light with in reach and bed is locked in lowest position.

## 2017-12-02 ENCOUNTER — APPOINTMENT (OUTPATIENT)
Dept: ONCOLOGY | Facility: MEDICAL CENTER | Age: 52
End: 2017-12-02
Attending: INTERNAL MEDICINE
Payer: MEDICAID

## 2017-12-02 NOTE — DISCHARGE INSTRUCTIONS
Discharge Instructions    Discharged to other by medical transportation with escort. Discharged via wheelchair, hospital escort: Yes.  Special equipment needed: Walker    Be sure to schedule a follow-up appointment with your primary care doctor or any specialists as instructed.     Discharge Plan:   Diet Plan: Discussed  Activity Level: Discussed  Confirmed Follow up Appointment: Appointment Scheduled (Pt transfering to SNF)  Confirmed Symptoms Management: Discussed  Medication Reconciliation Updated: Yes  Pneumococcal Vaccine Given - only chart on this line when given: Given (See MAR) (previousy given 11/20/2017)  Influenza Vaccine Indication: Not indicated: Previously immunized this influenza season and > 8 years of age    I understand that a diet low in cholesterol, fat, and sodium is recommended for good health. Unless I have been given specific instructions below for another diet, I accept this instruction as my diet prescription.   Other diet: Healthy regular diabetic diet  Special Instructions: None    · Is patient discharged on Warfarin / Coumadin?   No     · Is patient Post Blood Transfusion?  No    Depression / Suicide Risk    As you are discharged from this [facility] facility, it is important to learn how to keep safe from harming yourself.    Recognize the warning signs:  · Abrupt changes in personality, positive or negative- including increase in energy   · Giving away possessions  · Change in eating patterns- significant weight changes-  positive or negative  · Change in sleeping patterns- unable to sleep or sleeping all the time   · Unwillingness or inability to communicate  · Depression  · Unusual sadness, discouragement and loneliness  · Talk of wanting to die  · Neglect of personal appearance   · Rebelliousness- reckless behavior  · Withdrawal from people/activities they love  · Confusion- inability to concentrate     If you or a loved one observes any of these behaviors or has concerns about  "self-harm, here's what you can do:  · Talk about it- your feelings and reasons for harming yourself  · Remove any means that you might use to hurt yourself (examples: pills, rope, extension cords, firearm)  · Get professional help from the community (Mental Health, Substance Abuse, psychological counseling)  · Do not be alone:Call your Safe Contact- someone whom you trust who will be there for you.  · Call your local CRISIS HOTLINE 425-2939 or 507-433-1167  · Call your local Children's Mobile Crisis Response Team [place] (758) 052-5882 or www.Stir  · Call the toll free National Suicide Prevention Hotlines   · National Suicide Prevention Lifeline 572-170-PZGX (2577)  · National Hope Line Network 800-SUICIDE (645-8717)      PICC Home Guide  A peripherally inserted central catheter (PICC) is a long, thin, flexible tube that is inserted into a vein in the upper arm. It is a form of intravenous (IV) access. It is considered to be a \"central\" line because the tip of the PICC ends in a large vein in your chest. This large vein is called the superior vena cava (SVC). The PICC tip ends in the SVC because there is a lot of blood flow in the SVC. This allows medicines and IV fluids to be quickly distributed throughout the body. The PICC is inserted using a sterile technique by a specially trained nurse or physician. After the PICC is inserted, a chest X-ray exam is done to be sure it is in the correct place.   A PICC may be placed for different reasons, such as:  · To give medicines and liquid nutrition that can only be given through a central line. Examples are:  ¨ Certain antibiotic treatments.  ¨ Chemotherapy.  ¨ Total parenteral nutrition (TPN).  · To take frequent blood samples.  · To give IV fluids and blood products.  · If there is difficulty placing a peripheral intravenous (PIV) catheter.  If taken care of properly, a PICC can remain in place for several months. A PICC can also allow a person to go " "home from the hospital early. Medicine and PICC care can be managed at home by a family member or home health care team.  WHAT PROBLEMS CAN HAPPEN WHEN I HAVE A PICC?  Problems with a PICC can occasionally occur. These may include the following:  · A blood clot (thrombus) forming in or at the tip of the PICC. This can cause the PICC to become clogged. A clot-dissolving medicine called tissue plasminogen activator (tPA) can be given through the PICC to help break up the clot.  · Inflammation of the vein (phlebitis) in which the PICC is placed. Signs of inflammation may include redness, pain at the insertion site, red streaks, or being able to feel a \"cord\" in the vein where the PICC is located.  · Infection in the PICC or at the insertion site. Signs of infection may include fever, chills, redness, swelling, or pus drainage from the PICC insertion site.  · PICC movement (malposition). The PICC tip may move from its original position due to excessive physical activity, forceful coughing, sneezing, or vomiting.  · A break or cut in the PICC. It is important to not use scissors near the PICC.  · Nerve or tendon irritation or injury during PICC insertion.  WHAT SHOULD I KEEP IN MIND ABOUT ACTIVITIES WHEN I HAVE A PICC?  · You may bend your arm and move it freely. If your PICC is near or at the bend of your elbow, avoid activity with repeated motion at the elbow.  · Rest at home for the remainder of the day following PICC line insertion.  · Avoid lifting heavy objects as instructed by your health care provider.  · Avoid using a crutch with the arm on the same side as your PICC. You may need to use a walker.  WHAT SHOULD I KNOW ABOUT MY PICC DRESSING?  · Keep your PICC bandage (dressing) clean and dry to prevent infection.  ¨ Ask your health care provider when you may shower. Ask your health care provider to teach you how to wrap the PICC when you do take a shower.  · Change the PICC dressing as instructed by your health " "care provider.  · Change your PICC dressing if it becomes loose or wet.  WHAT SHOULD I KNOW ABOUT PICC CARE?  · Check the PICC insertion site daily for leakage, redness, swelling, or pain.  · Do not take a bath, swim, or use hot tubs when you have a PICC. Cover PICC line with clear plastic wrap and tape to keep it dry while showering.  · Flush the PICC as directed by your health care provider. Let your health care provider know right away if the PICC is difficult to flush or does not flush. Do not use force to flush the PICC.  · Do not use a syringe that is less than 10 mL to flush the PICC.  · Never pull or tug on the PICC.  · Avoid blood pressure checks on the arm with the PICC.  · Keep your PICC identification card with you at all times.  · Do not take the PICC out yourself. Only a trained clinical professional should remove the PICC.  SEEK IMMEDIATE MEDICAL CARE IF:  · Your PICC is accidentally pulled all the way out. If this happens, cover the insertion site with a bandage or gauze dressing. Do not throw the PICC away. Your health care provider will need to inspect it.  · Your PICC was tugged or pulled and has partially come out. Do not  push the PICC back in.  · There is any type of drainage, redness, or swelling where the PICC enters the skin.  · You cannot flush the PICC, it is difficult to flush, or the PICC leaks around the insertion site when it is flushed.  · You hear a \"flushing\" sound when the PICC is flushed.  · You have pain, discomfort, or numbness in your arm, shoulder, or jaw on the same side as the PICC.  · You feel your heart \"racing\" or skipping beats.  · You notice a hole or tear in the PICC.  · You develop chills or a fever.  MAKE SURE YOU:   · Understand these instructions.  · Will watch your condition.  · Will get help right away if you are not doing well or get worse.     This information is not intended to replace advice given to you by your health care provider. Make sure you discuss any " questions you have with your health care provider.     Document Released: 06/23/2004 Document Revised: 01/08/2016 Document Reviewed: 08/25/2014  [employer] Interactive Patient Education ©2016 [employer]

## 2017-12-02 NOTE — PROGRESS NOTES
Pt discharge to [facility] via [facility] transport. Discharge instructions given to pt, pt verbalized understanding. COBRA signed and place in chart. Pt left with PICC line in place, report given to receiving RN at facility

## 2017-12-03 ENCOUNTER — APPOINTMENT (OUTPATIENT)
Dept: ONCOLOGY | Facility: MEDICAL CENTER | Age: 52
End: 2017-12-03
Attending: INTERNAL MEDICINE
Payer: MEDICAID

## 2017-12-04 ENCOUNTER — APPOINTMENT (OUTPATIENT)
Dept: ONCOLOGY | Facility: MEDICAL CENTER | Age: 52
End: 2017-12-04
Attending: INTERNAL MEDICINE
Payer: MEDICAID

## 2017-12-05 ENCOUNTER — APPOINTMENT (OUTPATIENT)
Dept: ONCOLOGY | Facility: MEDICAL CENTER | Age: 52
End: 2017-12-05
Attending: INTERNAL MEDICINE
Payer: MEDICAID

## 2017-12-06 ENCOUNTER — APPOINTMENT (OUTPATIENT)
Dept: WOUND CARE | Facility: MEDICAL CENTER | Age: 52
End: 2017-12-06
Attending: HOSPITALIST
Payer: MEDICAID

## 2017-12-06 ENCOUNTER — APPOINTMENT (OUTPATIENT)
Dept: ONCOLOGY | Facility: MEDICAL CENTER | Age: 52
End: 2017-12-06
Attending: INTERNAL MEDICINE
Payer: MEDICAID

## 2017-12-07 ENCOUNTER — APPOINTMENT (OUTPATIENT)
Dept: ONCOLOGY | Facility: MEDICAL CENTER | Age: 52
End: 2017-12-07
Attending: INTERNAL MEDICINE
Payer: MEDICAID

## 2017-12-08 ENCOUNTER — APPOINTMENT (OUTPATIENT)
Dept: ONCOLOGY | Facility: MEDICAL CENTER | Age: 52
End: 2017-12-08
Attending: INTERNAL MEDICINE
Payer: MEDICAID

## 2017-12-09 ENCOUNTER — APPOINTMENT (OUTPATIENT)
Dept: ONCOLOGY | Facility: MEDICAL CENTER | Age: 52
End: 2017-12-09
Attending: INTERNAL MEDICINE
Payer: MEDICAID

## 2017-12-10 ENCOUNTER — APPOINTMENT (OUTPATIENT)
Dept: ONCOLOGY | Facility: MEDICAL CENTER | Age: 52
End: 2017-12-10
Payer: MEDICAID

## 2017-12-11 ENCOUNTER — APPOINTMENT (OUTPATIENT)
Dept: ONCOLOGY | Facility: MEDICAL CENTER | Age: 52
End: 2017-12-11

## 2017-12-12 ENCOUNTER — APPOINTMENT (OUTPATIENT)
Dept: ONCOLOGY | Facility: MEDICAL CENTER | Age: 52
End: 2017-12-12
Attending: INTERNAL MEDICINE
Payer: MEDICAID

## 2017-12-13 ENCOUNTER — APPOINTMENT (OUTPATIENT)
Dept: WOUND CARE | Facility: MEDICAL CENTER | Age: 52
End: 2017-12-13
Attending: HOSPITALIST
Payer: MEDICAID

## 2017-12-13 ENCOUNTER — APPOINTMENT (OUTPATIENT)
Dept: ONCOLOGY | Facility: MEDICAL CENTER | Age: 52
End: 2017-12-13
Attending: INTERNAL MEDICINE
Payer: MEDICAID

## 2017-12-14 ENCOUNTER — APPOINTMENT (OUTPATIENT)
Dept: ONCOLOGY | Facility: MEDICAL CENTER | Age: 52
End: 2017-12-14
Attending: INTERNAL MEDICINE
Payer: MEDICAID

## 2017-12-15 ENCOUNTER — APPOINTMENT (OUTPATIENT)
Dept: ONCOLOGY | Facility: MEDICAL CENTER | Age: 52
End: 2017-12-15
Attending: INTERNAL MEDICINE
Payer: MEDICAID

## 2017-12-20 ENCOUNTER — APPOINTMENT (OUTPATIENT)
Dept: WOUND CARE | Facility: MEDICAL CENTER | Age: 52
End: 2017-12-20
Attending: HOSPITALIST
Payer: MEDICAID

## 2017-12-28 ENCOUNTER — HOSPITAL ENCOUNTER (OUTPATIENT)
Facility: MEDICAL CENTER | Age: 52
End: 2017-12-28
Attending: NURSE PRACTITIONER
Payer: MEDICAID

## 2017-12-28 ENCOUNTER — OFFICE VISIT (OUTPATIENT)
Dept: INFECTIOUS DISEASES | Facility: MEDICAL CENTER | Age: 52
End: 2017-12-28
Payer: MEDICAID

## 2017-12-28 VITALS
HEIGHT: 72 IN | TEMPERATURE: 97.4 F | SYSTOLIC BLOOD PRESSURE: 118 MMHG | DIASTOLIC BLOOD PRESSURE: 70 MMHG | HEART RATE: 82 BPM | WEIGHT: 306 LBS | OXYGEN SATURATION: 94 % | BODY MASS INDEX: 41.45 KG/M2

## 2017-12-28 DIAGNOSIS — L97.509 TYPE 2 DIABETES MELLITUS WITH FOOT ULCER, WITHOUT LONG-TERM CURRENT USE OF INSULIN (HCC): ICD-10-CM

## 2017-12-28 DIAGNOSIS — S98.111A AMPUTATED GREAT TOE OF RIGHT FOOT (HCC): ICD-10-CM

## 2017-12-28 DIAGNOSIS — S89.92XD INJURY OF LEFT KNEE, SUBSEQUENT ENCOUNTER: ICD-10-CM

## 2017-12-28 DIAGNOSIS — E11.621 TYPE 2 DIABETES MELLITUS WITH FOOT ULCER, WITHOUT LONG-TERM CURRENT USE OF INSULIN (HCC): ICD-10-CM

## 2017-12-28 DIAGNOSIS — E66.01 MORBID OBESITY (HCC): ICD-10-CM

## 2017-12-28 DIAGNOSIS — I96 GANGRENE OF TOE OF RIGHT FOOT (HCC): ICD-10-CM

## 2017-12-28 PROCEDURE — 87077 CULTURE AEROBIC IDENTIFY: CPT

## 2017-12-28 PROCEDURE — 87186 SC STD MICRODIL/AGAR DIL: CPT

## 2017-12-28 PROCEDURE — 87070 CULTURE OTHR SPECIMN AEROBIC: CPT

## 2017-12-28 PROCEDURE — 87205 SMEAR GRAM STAIN: CPT

## 2017-12-28 PROCEDURE — 99214 OFFICE O/P EST MOD 30 MIN: CPT | Performed by: NURSE PRACTITIONER

## 2017-12-28 ASSESSMENT — PAIN SCALES - GENERAL: PAINLEVEL: 3=SLIGHT PAIN

## 2017-12-28 NOTE — ADDENDUM NOTE
Encounter addended by: Kailey Rojas R.N. on: 12/28/2017 11:58 AM<BR>    Actions taken: Flowsheet accepted

## 2017-12-28 NOTE — PROGRESS NOTES
"Subjective:     Chief Complaint   Patient presents with   • Follow-Up     Big toe R amputation, gangrene        Infectious Disease clinic follow up  Sebastián Castro 52 y.o.male in clinic today for evaluation and management of right great toe OM. Primary care provider: Pcp Pt States None. This is my first time meeting Mr. Castro. H/o HTN, DM. Pt reporting that BS have been well controlled \"120.\"    Interval History: patient hospitalized 11/10/17-11/21/17 due to gangrene, OM great toe. S/p right great toe amputation 11/10/17.  Cxs are group B strep and Proteus. Path showed ulceration to margin.  Patient discharged to [facility] on IV ertapenem x 6 weeks. Need date 12/22/17. Patient was receiving outpatient antibiotics through [facility] when he fell. He then developed pain to his left knee. Returned to the hospital 12/23/17-12/1/17. Found to have a torn medial and lateral meniscus to his left knee. Discharged to [facility] re[facility]. Plan to continue Ertapenem. End date 12/22/17.      Hospital records reviewed    Today 12/28/17: Patient reports feeling well. He states that he is no longer on antibiotics. Per patient, he \"had a bad allergic reaction\" and the Hospitalist at [facility] ended infusion 12/20/17 due to this and DCd the PICC  Pt stating that he had hives and itching, which resolved after stopping the antibiotic. Pt stating that the right great toe amputation site seems to be healing ok. He states that he has had this scabbed area since hospitalization. Sutures removed during last hospitalization.  Patient reporting serosanguinous drainage that has been fairly constant since surgery. Denies pungent odor, redness, pain. Denies feeling generally ill, fevers/chills, general malaise, headache, n/v/d.     Past Medical History:   Diagnosis Date   • Hypertension        Allergies: Pcn [penicillins]    Current medications and problem list reviewed with patient and updated in EPIC.    ROS  As documented above in my HPI       " Objective:     PE:  /70   Pulse 82   Temp 36.3 °C (97.4 °F)   Ht 1.829 m (6')   Wt (!) 138.8 kg (306 lb)   SpO2 94%   BMI 41.50 kg/m²      Vital signs reviewed  Constitutional: patient is oriented to person, place, and time. He appears well-developed and well-nourished. No distress. Morbid obesity  Eyes: Conjunctivae normal and EOM are normal. Pupils are equal, round, and reactive to light.   Mouth/Throat: Lips without lesions, good dentition, oropharynx is clear and moist.  Neck: Trachea midline. Normal range of motion. Neck supple. No masses  Cardiovascular: Normal rate, regular rhythm, normal heart sounds and intact distal pulses. No murmur, gallop, or friction rub. No edema.  Pulmonary/Chest: No respiratory distress. Unlabored respiratory effort, lungs clear to auscultation. No wheezes or rales.   Abdominal: Soft, non tender. BS + x 4. No masses or hepatosplenomegaly.   Musculoskeletal: Normal range of motion. No tenderness, swelling, erythema, deformity noted.  Neurological: He is alert and oriented to person, place, and time. No cranial nerve deficit. Coordination normal.   Skin: Skin is warm and dry. Good turgor. No rashes visable.  Right great toe- amputation site. +dark eschar. Pin point area draining serosanguinous drainage. No odor noted. Culture obtained of drainage in clinic. No obvious odor. No erythema or swelling.   Psychiatric: He has a normal mood and affect. His behavior is normal.     Assessment and Plan:   The following treatment plan was discussed with patient at length  1. Gangrene of toe of right foot, s/p recent amputation (CMS-HCC)  CULTURE WOUND W/ GRAM STAIN    REFERRAL TO WOUND CLINIC   2. Type 2 diabetes mellitus with foot ulcer, without long-term current use of insulin (CMS-HCC)  CULTURE WOUND W/ GRAM STAIN    REFERRAL TO WOUND CLINIC   3. Morbid obesity (CMS-HCC)  CULTURE WOUND W/ GRAM STAIN    REFERRAL TO WOUND CLINIC   4. Amputated great toe of right foot (CMS-HCC)   CULTURE WOUND W/ GRAM STAIN    REFERRAL TO WOUND CLINIC   5. Injury of left knee, subsequent encounter       Patient not currently on antibiotics  Wound drainage cultured in clinic  Patient would benefit from advanced wound care/debridement  Patient at rehab currently due to knee injury  Follow up: 2 weeks, RTC sooner if needed. FU with PCP for ongoing chronic medical conditions.

## 2017-12-28 NOTE — ADDENDUM NOTE
Encounter addended by: Kailey Rojas R.N. on: 12/28/2017 12:02 PM<BR>    Actions taken: Flowsheet accepted

## 2017-12-29 LAB
GRAM STN SPEC: NORMAL
SIGNIFICANT IND 70042: NORMAL
SITE SITE: NORMAL
SOURCE SOURCE: NORMAL

## 2017-12-30 LAB
BACTERIA WND AEROBE CULT: ABNORMAL
BACTERIA WND AEROBE CULT: ABNORMAL
GRAM STN SPEC: ABNORMAL
SIGNIFICANT IND 70042: ABNORMAL
SITE SITE: ABNORMAL
SOURCE SOURCE: ABNORMAL

## 2018-01-03 ENCOUNTER — PATIENT OUTREACH (OUTPATIENT)
Dept: HEALTH INFORMATION MANAGEMENT | Facility: OTHER | Age: 53
End: 2018-01-03

## 2018-01-03 ENCOUNTER — HOME HEALTH ADMISSION (OUTPATIENT)
Dept: HOME HEALTH SERVICES | Facility: HOME HEALTHCARE | Age: 53
End: 2018-01-03
Payer: MEDICAID

## 2018-01-06 ENCOUNTER — HOME CARE VISIT (OUTPATIENT)
Dept: HOME HEALTH SERVICES | Facility: HOME HEALTHCARE | Age: 53
End: 2018-01-06
Payer: MEDICAID

## 2018-01-06 PROCEDURE — A6216 NON-STERILE GAUZE<=16 SQ IN: HCPCS

## 2018-01-06 PROCEDURE — G0493 RN CARE EA 15 MIN HH/HOSPICE: HCPCS

## 2018-01-06 PROCEDURE — 665001 SOC-HOME HEALTH

## 2018-01-06 PROCEDURE — 6650300 HCR  CLEANSER 4-IN-1

## 2018-01-06 SDOH — ECONOMIC STABILITY: HOUSING INSECURITY: UNSAFE COOKING RANGE AREA: 0

## 2018-01-06 SDOH — ECONOMIC STABILITY: HOUSING INSECURITY: UNSAFE APPLIANCES: 0

## 2018-01-06 ASSESSMENT — ENCOUNTER SYMPTOMS
SHORTNESS OF BREATH: T
NAUSEA: PT DENIES
VOMITING: PT DENIES

## 2018-01-06 ASSESSMENT — ACTIVITIES OF DAILY LIVING (ADL)
HOME_HEALTH_OASIS: 04
OASIS_M1830: 05

## 2018-01-06 ASSESSMENT — PATIENT HEALTH QUESTIONNAIRE - PHQ9
2. FEELING DOWN, DEPRESSED, IRRITABLE, OR HOPELESS: 01
1. LITTLE INTEREST OR PLEASURE IN DOING THINGS: 01

## 2018-01-08 ENCOUNTER — HOME CARE VISIT (OUTPATIENT)
Dept: HOME HEALTH SERVICES | Facility: HOME HEALTHCARE | Age: 53
End: 2018-01-08
Payer: MEDICAID

## 2018-01-08 VITALS
OXYGEN SATURATION: 98 % | SYSTOLIC BLOOD PRESSURE: 124 MMHG | DIASTOLIC BLOOD PRESSURE: 62 MMHG | HEART RATE: 73 BPM | TEMPERATURE: 97.6 F | RESPIRATION RATE: 16 BRPM

## 2018-01-08 PROCEDURE — G0300 HHS/HOSPICE OF LPN EA 15 MIN: HCPCS

## 2018-01-08 SDOH — ECONOMIC STABILITY: HOUSING INSECURITY: UNSAFE APPLIANCES: 0

## 2018-01-08 SDOH — ECONOMIC STABILITY: HOUSING INSECURITY: UNSAFE COOKING RANGE AREA: 0

## 2018-01-08 ASSESSMENT — ENCOUNTER SYMPTOMS: SHORTNESS OF BREATH: F

## 2018-01-09 ENCOUNTER — TELEPHONE (OUTPATIENT)
Dept: VASCULAR LAB | Facility: MEDICAL CENTER | Age: 53
End: 2018-01-09

## 2018-01-09 VITALS
WEIGHT: 294.6 LBS | TEMPERATURE: 97.4 F | HEART RATE: 82 BPM | SYSTOLIC BLOOD PRESSURE: 128 MMHG | BODY MASS INDEX: 39.9 KG/M2 | DIASTOLIC BLOOD PRESSURE: 78 MMHG | OXYGEN SATURATION: 98 % | RESPIRATION RATE: 14 BRPM | HEIGHT: 72 IN

## 2018-01-10 ENCOUNTER — HOME CARE VISIT (OUTPATIENT)
Dept: HOME HEALTH SERVICES | Facility: HOME HEALTHCARE | Age: 53
End: 2018-01-10
Payer: MEDICAID

## 2018-01-10 VITALS
SYSTOLIC BLOOD PRESSURE: 134 MMHG | RESPIRATION RATE: 20 BRPM | DIASTOLIC BLOOD PRESSURE: 78 MMHG | TEMPERATURE: 98.2 F | OXYGEN SATURATION: 98 % | HEART RATE: 80 BPM

## 2018-01-10 VITALS
RESPIRATION RATE: 22 BRPM | OXYGEN SATURATION: 98 % | TEMPERATURE: 98.2 F | DIASTOLIC BLOOD PRESSURE: 78 MMHG | HEART RATE: 80 BPM | SYSTOLIC BLOOD PRESSURE: 134 MMHG

## 2018-01-10 PROCEDURE — A6403 STERILE GAUZE>16 <= 48 SQ IN: HCPCS

## 2018-01-10 PROCEDURE — G0299 HHS/HOSPICE OF RN EA 15 MIN: HCPCS

## 2018-01-10 PROCEDURE — 6650300 HCR  CLEANSER 4-IN-1

## 2018-01-10 PROCEDURE — G0151 HHCP-SERV OF PT,EA 15 MIN: HCPCS

## 2018-01-10 PROCEDURE — A4450 NON-WATERPROOF TAPE: HCPCS

## 2018-01-10 ASSESSMENT — ACTIVITIES OF DAILY LIVING (ADL)
ADLS_COMMENTS: <!--EPICS-->SEE OT REPORT<!--EPICE-->
IADLS_COMMENTS: <!--EPICS-->SEE OT REPORT<!--EPICE-->

## 2018-01-10 ASSESSMENT — ENCOUNTER SYMPTOMS
NAUSEA: O
VOMITING: NO

## 2018-01-11 ENCOUNTER — TELEPHONE (OUTPATIENT)
Dept: VASCULAR LAB | Facility: MEDICAL CENTER | Age: 53
End: 2018-01-11

## 2018-01-11 NOTE — TELEPHONE ENCOUNTER
Spoke with Sebastián to discuss duplicate therapy of Mobic and Celebrex.  He wishes to continue celebrex.  Will dc leslie Araujo, PharmD

## 2018-01-12 ENCOUNTER — HOME CARE VISIT (OUTPATIENT)
Dept: HOME HEALTH SERVICES | Facility: HOME HEALTHCARE | Age: 53
End: 2018-01-12
Payer: MEDICAID

## 2018-01-12 PROCEDURE — G0300 HHS/HOSPICE OF LPN EA 15 MIN: HCPCS

## 2018-01-14 SDOH — ECONOMIC STABILITY: HOUSING INSECURITY: UNSAFE COOKING RANGE AREA: 0

## 2018-01-14 SDOH — ECONOMIC STABILITY: HOUSING INSECURITY: UNSAFE APPLIANCES: 0

## 2018-01-15 ENCOUNTER — RESOLUTE PROFESSIONAL BILLING HOSPITAL PROF FEE (OUTPATIENT)
Dept: HOSPITALIST | Facility: MEDICAL CENTER | Age: 53
End: 2018-01-15
Payer: MEDICAID

## 2018-01-15 ENCOUNTER — HOME CARE VISIT (OUTPATIENT)
Dept: HOME HEALTH SERVICES | Facility: HOME HEALTHCARE | Age: 53
End: 2018-01-15
Payer: MEDICAID

## 2018-01-15 ENCOUNTER — APPOINTMENT (OUTPATIENT)
Dept: RADIOLOGY | Facility: MEDICAL CENTER | Age: 53
DRG: 475 | End: 2018-01-15
Attending: EMERGENCY MEDICINE
Payer: MEDICAID

## 2018-01-15 ENCOUNTER — HOSPITAL ENCOUNTER (INPATIENT)
Facility: MEDICAL CENTER | Age: 53
LOS: 9 days | DRG: 475 | End: 2018-01-24
Attending: EMERGENCY MEDICINE | Admitting: INTERNAL MEDICINE
Payer: MEDICAID

## 2018-01-15 VITALS
HEART RATE: 80 BPM | BODY MASS INDEX: 39.6 KG/M2 | SYSTOLIC BLOOD PRESSURE: 120 MMHG | RESPIRATION RATE: 20 BRPM | TEMPERATURE: 98.2 F | WEIGHT: 292 LBS | DIASTOLIC BLOOD PRESSURE: 70 MMHG | OXYGEN SATURATION: 98 %

## 2018-01-15 VITALS
RESPIRATION RATE: 20 BRPM | SYSTOLIC BLOOD PRESSURE: 120 MMHG | HEART RATE: 80 BPM | DIASTOLIC BLOOD PRESSURE: 70 MMHG | TEMPERATURE: 98.2 F | OXYGEN SATURATION: 98 %

## 2018-01-15 DIAGNOSIS — I96 GANGRENE OF TOE OF RIGHT FOOT (HCC): ICD-10-CM

## 2018-01-15 DIAGNOSIS — L97.509 TYPE 2 DIABETES MELLITUS WITH FOOT ULCER, WITHOUT LONG-TERM CURRENT USE OF INSULIN (HCC): ICD-10-CM

## 2018-01-15 DIAGNOSIS — S98.111A AMPUTATED GREAT TOE OF RIGHT FOOT (HCC): ICD-10-CM

## 2018-01-15 DIAGNOSIS — E11.621 TYPE 2 DIABETES MELLITUS WITH FOOT ULCER, WITHOUT LONG-TERM CURRENT USE OF INSULIN (HCC): ICD-10-CM

## 2018-01-15 DIAGNOSIS — T14.8XXA OPEN WOUND: ICD-10-CM

## 2018-01-15 PROBLEM — S98.119A LOWER LIMB AMPUTATION, GREAT TOE (HCC): Status: ACTIVE | Noted: 2018-01-15

## 2018-01-15 PROBLEM — E78.5 HLD (HYPERLIPIDEMIA): Status: ACTIVE | Noted: 2018-01-15

## 2018-01-15 PROBLEM — K21.9 GERD (GASTROESOPHAGEAL REFLUX DISEASE): Status: ACTIVE | Noted: 2018-01-15

## 2018-01-15 LAB
ALBUMIN SERPL BCP-MCNC: 3.6 G/DL (ref 3.2–4.9)
ALBUMIN/GLOB SERPL: 1.2 G/DL
ALP SERPL-CCNC: 53 U/L (ref 30–99)
ALT SERPL-CCNC: 27 U/L (ref 2–50)
ANION GAP SERPL CALC-SCNC: 8 MMOL/L (ref 0–11.9)
APTT PPP: 29.8 SEC (ref 24.7–36)
AST SERPL-CCNC: 16 U/L (ref 12–45)
BASOPHILS # BLD AUTO: 0.5 % (ref 0–1.8)
BASOPHILS # BLD: 0.04 K/UL (ref 0–0.12)
BILIRUB SERPL-MCNC: 0.5 MG/DL (ref 0.1–1.5)
BUN SERPL-MCNC: 15 MG/DL (ref 8–22)
CALCIUM SERPL-MCNC: 8.8 MG/DL (ref 8.5–10.5)
CHLORIDE SERPL-SCNC: 102 MMOL/L (ref 96–112)
CO2 SERPL-SCNC: 25 MMOL/L (ref 20–33)
CREAT SERPL-MCNC: 0.77 MG/DL (ref 0.5–1.4)
CRP SERPL HS-MCNC: 3.88 MG/DL (ref 0–0.75)
EOSINOPHIL # BLD AUTO: 0.33 K/UL (ref 0–0.51)
EOSINOPHIL NFR BLD: 3.9 % (ref 0–6.9)
ERYTHROCYTE [DISTWIDTH] IN BLOOD BY AUTOMATED COUNT: 42.3 FL (ref 35.9–50)
ERYTHROCYTE [SEDIMENTATION RATE] IN BLOOD BY WESTERGREN METHOD: 47 MM/HOUR (ref 0–20)
GLOBULIN SER CALC-MCNC: 3 G/DL (ref 1.9–3.5)
GLUCOSE BLD-MCNC: 144 MG/DL (ref 65–99)
GLUCOSE BLD-MCNC: 183 MG/DL (ref 65–99)
GLUCOSE SERPL-MCNC: 167 MG/DL (ref 65–99)
GRAM STN SPEC: NORMAL
HCT VFR BLD AUTO: 37.8 % (ref 42–52)
HGB BLD-MCNC: 12.7 G/DL (ref 14–18)
IMM GRANULOCYTES # BLD AUTO: 0.02 K/UL (ref 0–0.11)
IMM GRANULOCYTES NFR BLD AUTO: 0.2 % (ref 0–0.9)
INR PPP: 0.98 (ref 0.87–1.13)
LYMPHOCYTES # BLD AUTO: 2.62 K/UL (ref 1–4.8)
LYMPHOCYTES NFR BLD: 30.8 % (ref 22–41)
MCH RBC QN AUTO: 27.7 PG (ref 27–33)
MCHC RBC AUTO-ENTMCNC: 33.6 G/DL (ref 33.7–35.3)
MCV RBC AUTO: 82.4 FL (ref 81.4–97.8)
MONOCYTES # BLD AUTO: 0.62 K/UL (ref 0–0.85)
MONOCYTES NFR BLD AUTO: 7.3 % (ref 0–13.4)
NEUTROPHILS # BLD AUTO: 4.89 K/UL (ref 1.82–7.42)
NEUTROPHILS NFR BLD: 57.3 % (ref 44–72)
NRBC # BLD AUTO: 0 K/UL
NRBC BLD-RTO: 0 /100 WBC
PLATELET # BLD AUTO: 175 K/UL (ref 164–446)
PMV BLD AUTO: 11 FL (ref 9–12.9)
POTASSIUM SERPL-SCNC: 3.5 MMOL/L (ref 3.6–5.5)
PREALB SERPL-MCNC: 12 MG/DL (ref 18–38)
PROT SERPL-MCNC: 6.6 G/DL (ref 6–8.2)
PROTHROMBIN TIME: 12.7 SEC (ref 12–14.6)
RBC # BLD AUTO: 4.59 M/UL (ref 4.7–6.1)
SIGNIFICANT IND 70042: NORMAL
SITE SITE: NORMAL
SODIUM SERPL-SCNC: 135 MMOL/L (ref 135–145)
SOURCE SOURCE: NORMAL
WBC # BLD AUTO: 8.5 K/UL (ref 4.8–10.8)

## 2018-01-15 PROCEDURE — 86140 C-REACTIVE PROTEIN: CPT

## 2018-01-15 PROCEDURE — 36415 COLL VENOUS BLD VENIPUNCTURE: CPT

## 2018-01-15 PROCEDURE — A9270 NON-COVERED ITEM OR SERVICE: HCPCS | Performed by: INTERNAL MEDICINE

## 2018-01-15 PROCEDURE — 82962 GLUCOSE BLOOD TEST: CPT | Mod: 91

## 2018-01-15 PROCEDURE — 85652 RBC SED RATE AUTOMATED: CPT

## 2018-01-15 PROCEDURE — 87070 CULTURE OTHR SPECIMN AEROBIC: CPT

## 2018-01-15 PROCEDURE — 87077 CULTURE AEROBIC IDENTIFY: CPT | Mod: 91

## 2018-01-15 PROCEDURE — 87205 SMEAR GRAM STAIN: CPT

## 2018-01-15 PROCEDURE — 99285 EMERGENCY DEPT VISIT HI MDM: CPT

## 2018-01-15 PROCEDURE — 85730 THROMBOPLASTIN TIME PARTIAL: CPT

## 2018-01-15 PROCEDURE — 700111 HCHG RX REV CODE 636 W/ 250 OVERRIDE (IP): Performed by: INTERNAL MEDICINE

## 2018-01-15 PROCEDURE — 700102 HCHG RX REV CODE 250 W/ 637 OVERRIDE(OP): Performed by: INTERNAL MEDICINE

## 2018-01-15 PROCEDURE — 73630 X-RAY EXAM OF FOOT: CPT | Mod: RT

## 2018-01-15 PROCEDURE — 770006 HCHG ROOM/CARE - MED/SURG/GYN SEMI*

## 2018-01-15 PROCEDURE — 80053 COMPREHEN METABOLIC PANEL: CPT

## 2018-01-15 PROCEDURE — 85610 PROTHROMBIN TIME: CPT

## 2018-01-15 PROCEDURE — G0299 HHS/HOSPICE OF RN EA 15 MIN: HCPCS

## 2018-01-15 PROCEDURE — 99223 1ST HOSP IP/OBS HIGH 75: CPT | Performed by: INTERNAL MEDICINE

## 2018-01-15 PROCEDURE — 96375 TX/PRO/DX INJ NEW DRUG ADDON: CPT

## 2018-01-15 PROCEDURE — G0157 HHC PT ASSISTANT EA 15: HCPCS

## 2018-01-15 PROCEDURE — A6403 STERILE GAUZE>16 <= 48 SQ IN: HCPCS

## 2018-01-15 PROCEDURE — 87186 SC STD MICRODIL/AGAR DIL: CPT | Mod: 91

## 2018-01-15 PROCEDURE — 96365 THER/PROPH/DIAG IV INF INIT: CPT

## 2018-01-15 PROCEDURE — 96366 THER/PROPH/DIAG IV INF ADDON: CPT

## 2018-01-15 PROCEDURE — 84134 ASSAY OF PREALBUMIN: CPT

## 2018-01-15 PROCEDURE — 85025 COMPLETE CBC W/AUTO DIFF WBC: CPT

## 2018-01-15 PROCEDURE — 700111 HCHG RX REV CODE 636 W/ 250 OVERRIDE (IP): Performed by: EMERGENCY MEDICINE

## 2018-01-15 PROCEDURE — 700105 HCHG RX REV CODE 258: Performed by: EMERGENCY MEDICINE

## 2018-01-15 RX ORDER — ACETAMINOPHEN 325 MG/1
650 TABLET ORAL EVERY 6 HOURS PRN
Status: DISCONTINUED | OUTPATIENT
Start: 2018-01-15 | End: 2018-01-24 | Stop reason: HOSPADM

## 2018-01-15 RX ORDER — POTASSIUM CHLORIDE 20 MEQ/1
40 TABLET, EXTENDED RELEASE ORAL EVERY 4 HOURS
Status: DISPENSED | OUTPATIENT
Start: 2018-01-15 | End: 2018-01-15

## 2018-01-15 RX ORDER — BISACODYL 10 MG
10 SUPPOSITORY, RECTAL RECTAL
Status: DISCONTINUED | OUTPATIENT
Start: 2018-01-15 | End: 2018-01-24 | Stop reason: HOSPADM

## 2018-01-15 RX ORDER — OXYCODONE HYDROCHLORIDE 5 MG/1
2.5 TABLET ORAL
Status: DISCONTINUED | OUTPATIENT
Start: 2018-01-15 | End: 2018-01-24 | Stop reason: HOSPADM

## 2018-01-15 RX ORDER — AMOXICILLIN 250 MG
2 CAPSULE ORAL 2 TIMES DAILY
Status: DISCONTINUED | OUTPATIENT
Start: 2018-01-15 | End: 2018-01-24 | Stop reason: HOSPADM

## 2018-01-15 RX ORDER — CELECOXIB 200 MG/1
200 CAPSULE ORAL 2 TIMES DAILY
Status: DISCONTINUED | OUTPATIENT
Start: 2018-01-15 | End: 2018-01-24 | Stop reason: HOSPADM

## 2018-01-15 RX ORDER — HEPARIN SODIUM 5000 [USP'U]/ML
5000 INJECTION, SOLUTION INTRAVENOUS; SUBCUTANEOUS EVERY 8 HOURS
Status: DISCONTINUED | OUTPATIENT
Start: 2018-01-15 | End: 2018-01-17

## 2018-01-15 RX ORDER — FAMOTIDINE 20 MG/1
20 TABLET, FILM COATED ORAL 2 TIMES DAILY
COMMUNITY
End: 2018-03-08 | Stop reason: SDUPTHER

## 2018-01-15 RX ORDER — METRONIDAZOLE 500 MG/1
500 TABLET ORAL EVERY 8 HOURS
Status: DISCONTINUED | OUTPATIENT
Start: 2018-01-15 | End: 2018-01-18

## 2018-01-15 RX ORDER — LISINOPRIL 20 MG/1
40 TABLET ORAL DAILY
Status: DISCONTINUED | OUTPATIENT
Start: 2018-01-16 | End: 2018-01-24 | Stop reason: HOSPADM

## 2018-01-15 RX ORDER — OXYCODONE HYDROCHLORIDE 5 MG/1
5 TABLET ORAL
Status: DISCONTINUED | OUTPATIENT
Start: 2018-01-15 | End: 2018-01-24 | Stop reason: HOSPADM

## 2018-01-15 RX ORDER — MORPHINE SULFATE 4 MG/ML
2 INJECTION, SOLUTION INTRAMUSCULAR; INTRAVENOUS
Status: DISCONTINUED | OUTPATIENT
Start: 2018-01-15 | End: 2018-01-24 | Stop reason: HOSPADM

## 2018-01-15 RX ORDER — LINEZOLID 600 MG/1
600 TABLET, FILM COATED ORAL EVERY 12 HOURS
Status: DISCONTINUED | OUTPATIENT
Start: 2018-01-15 | End: 2018-01-24 | Stop reason: HOSPADM

## 2018-01-15 RX ORDER — CARVEDILOL 3.12 MG/1
3.12 TABLET ORAL 2 TIMES DAILY WITH MEALS
Status: DISCONTINUED | OUTPATIENT
Start: 2018-01-15 | End: 2018-01-24 | Stop reason: HOSPADM

## 2018-01-15 RX ORDER — DEXTROSE MONOHYDRATE 25 G/50ML
25 INJECTION, SOLUTION INTRAVENOUS
Status: DISCONTINUED | OUTPATIENT
Start: 2018-01-15 | End: 2018-01-24 | Stop reason: HOSPADM

## 2018-01-15 RX ORDER — ONDANSETRON 4 MG/1
4 TABLET, ORALLY DISINTEGRATING ORAL EVERY 4 HOURS PRN
Status: DISCONTINUED | OUTPATIENT
Start: 2018-01-15 | End: 2018-01-24 | Stop reason: HOSPADM

## 2018-01-15 RX ORDER — FAMOTIDINE 20 MG/1
20 TABLET, FILM COATED ORAL EVERY EVENING
Status: DISCONTINUED | OUTPATIENT
Start: 2018-01-15 | End: 2018-01-24 | Stop reason: HOSPADM

## 2018-01-15 RX ORDER — PRAVASTATIN SODIUM 10 MG
20 TABLET ORAL EVERY EVENING
Status: DISCONTINUED | OUTPATIENT
Start: 2018-01-15 | End: 2018-01-24 | Stop reason: HOSPADM

## 2018-01-15 RX ORDER — AMLODIPINE BESYLATE 10 MG/1
10 TABLET ORAL DAILY
Status: DISCONTINUED | OUTPATIENT
Start: 2018-01-15 | End: 2018-01-22

## 2018-01-15 RX ORDER — POLYETHYLENE GLYCOL 3350 17 G/17G
1 POWDER, FOR SOLUTION ORAL
Status: DISCONTINUED | OUTPATIENT
Start: 2018-01-15 | End: 2018-01-24 | Stop reason: HOSPADM

## 2018-01-15 RX ORDER — ONDANSETRON 2 MG/ML
4 INJECTION INTRAMUSCULAR; INTRAVENOUS EVERY 4 HOURS PRN
Status: DISCONTINUED | OUTPATIENT
Start: 2018-01-15 | End: 2018-01-24 | Stop reason: HOSPADM

## 2018-01-15 RX ADMIN — AMLODIPINE BESYLATE 10 MG: 10 TABLET ORAL at 18:31

## 2018-01-15 RX ADMIN — CARVEDILOL 3.12 MG: 3.12 TABLET, FILM COATED ORAL at 18:31

## 2018-01-15 RX ADMIN — STANDARDIZED SENNA CONCENTRATE AND DOCUSATE SODIUM 2 TABLET: 8.6; 5 TABLET, FILM COATED ORAL at 20:37

## 2018-01-15 RX ADMIN — CEFEPIME 2 G: 2 INJECTION, POWDER, FOR SOLUTION INTRAMUSCULAR; INTRAVENOUS at 17:09

## 2018-01-15 RX ADMIN — POTASSIUM CHLORIDE 40 MEQ: 1500 TABLET, EXTENDED RELEASE ORAL at 18:31

## 2018-01-15 RX ADMIN — PRAVASTATIN SODIUM 20 MG: 10 TABLET ORAL at 20:37

## 2018-01-15 RX ADMIN — CELECOXIB 200 MG: 200 CAPSULE ORAL at 20:37

## 2018-01-15 RX ADMIN — HEPARIN SODIUM 5000 UNITS: 5000 INJECTION, SOLUTION INTRAVENOUS; SUBCUTANEOUS at 22:50

## 2018-01-15 RX ADMIN — FAMOTIDINE 20 MG: 20 TABLET, FILM COATED ORAL at 20:37

## 2018-01-15 RX ADMIN — LINEZOLID 600 MG: 600 TABLET, FILM COATED ORAL at 20:37

## 2018-01-15 RX ADMIN — VANCOMYCIN HYDROCHLORIDE 3000 MG: 100 INJECTION, POWDER, LYOPHILIZED, FOR SOLUTION INTRAVENOUS at 13:36

## 2018-01-15 RX ADMIN — INSULIN HUMAN 2 UNITS: 100 INJECTION, SOLUTION PARENTERAL at 20:48

## 2018-01-15 RX ADMIN — METRONIDAZOLE 500 MG: 500 TABLET ORAL at 22:50

## 2018-01-15 RX ADMIN — OXYCODONE HYDROCHLORIDE 5 MG: 5 TABLET ORAL at 22:50

## 2018-01-15 ASSESSMENT — PAIN SCALES - GENERAL
PAINLEVEL_OUTOF10: 0
PAINLEVEL_OUTOF10: 7
PAINLEVEL_OUTOF10: 7
PAINLEVEL_OUTOF10: 2

## 2018-01-15 ASSESSMENT — PATIENT HEALTH QUESTIONNAIRE - PHQ9
3. TROUBLE FALLING OR STAYING ASLEEP OR SLEEPING TOO MUCH: SEVERAL DAYS
6. FEELING BAD ABOUT YOURSELF - OR THAT YOU ARE A FAILURE OR HAVE LET YOURSELF OR YOUR FAMILY DOWN: SEVERAL DAYS
SUM OF ALL RESPONSES TO PHQ QUESTIONS 1-9: 6
5. POOR APPETITE OR OVEREATING: NOT AT ALL
4. FEELING TIRED OR HAVING LITTLE ENERGY: SEVERAL DAYS
1. LITTLE INTEREST OR PLEASURE IN DOING THINGS: SEVERAL DAYS
SUM OF ALL RESPONSES TO PHQ9 QUESTIONS 1 AND 2: 2
2. FEELING DOWN, DEPRESSED, IRRITABLE, OR HOPELESS: SEVERAL DAYS
9. THOUGHTS THAT YOU WOULD BE BETTER OFF DEAD, OR OF HURTING YOURSELF: SEVERAL DAYS
8. MOVING OR SPEAKING SO SLOWLY THAT OTHER PEOPLE COULD HAVE NOTICED. OR THE OPPOSITE, BEING SO FIGETY OR RESTLESS THAT YOU HAVE BEEN MOVING AROUND A LOT MORE THAN USUAL: NOT AT ALL
7. TROUBLE CONCENTRATING ON THINGS, SUCH AS READING THE NEWSPAPER OR WATCHING TELEVISION: NOT AT ALL

## 2018-01-15 ASSESSMENT — ENCOUNTER SYMPTOMS
SPUTUM PRODUCTION: 0
DIAPHORESIS: 0
BLURRED VISION: 0
COUGH: 0
PALPITATIONS: 0
VOMITING: NO
CHILLS: 1
CONSTIPATION: 0
SHORTNESS OF BREATH: 0
MYALGIAS: 0
DEPRESSION: 1
VOMITING: 0
WHEEZING: 0
DIZZINESS: 0
NAUSEA: NO
FLANK PAIN: 0
DOUBLE VISION: 0
ORTHOPNEA: 0
HEMOPTYSIS: 0
HEADACHES: 0
NAUSEA: 0
WEAKNESS: 0
CLAUDICATION: 0
BLOOD IN STOOL: 0
ABDOMINAL PAIN: 0
HEARTBURN: 0
NECK PAIN: 0
PND: 0
DIARRHEA: 0
FEVER: 0

## 2018-01-15 ASSESSMENT — LIFESTYLE VARIABLES
DO YOU DRINK ALCOHOL: NO
EVER_SMOKED: NEVER
EVER_SMOKED: NEVER

## 2018-01-15 NOTE — ED NOTES
EMS sts, Pt had a recent toe amputation on the right foot. Home health nurse checked his foot this morning and sts his open wound is full thickness and needed to come in. Pt has pain to the right foot even when he is sitting still.

## 2018-01-15 NOTE — CONSULTS
ID consult from [facility]  DFI  Recent resistant entercoccus  Start Zyvox, cefepime, flagyl  MRI if feasible  LPS eval  Full consult to follow

## 2018-01-15 NOTE — ED PROVIDER NOTES
ED Provider Note    CHIEF COMPLAINT  Chief Complaint   Patient presents with   • Toe Pain   • Open Wound       HPI  Sebastián Castro is a 52 y.o. male who presents with an open wound to his right foot. The patient had an amputation performed in November secondary to a gangrenous right great toe. The patient has been doing well until last week when he started developing some throbbing pain in the region of the incision. Subsequently developed increased erythema as well as discharged and an open wound. The patient was seemed by his home health nurse and sent in here for further evaluation. He does not have any associated fever nor nausea or vomiting. He has localized discomfort to the right foot.    REVIEW OF SYSTEMS  See HPI for further details. All other systems are negative.     PAST MEDICAL HISTORY  Past Medical History:   Diagnosis Date   • Diabetes (CMS-[facility])    • Hypertension        SOCIAL HISTORY  Social History     Social History   • Marital status:      Spouse name: N/A   • Number of children: N/A   • Years of education: N/A     Social History Main Topics   • Smoking status: Never Smoker   • Smokeless tobacco: Never Used   • Alcohol use No   • Drug use: No   • Sexual activity: Not on file     Other Topics Concern   • Not on file     Social History Narrative   • No narrative on file           PHYSICAL EXAM  VITAL SIGNS: /61   Pulse 80   Temp 37.3 °C (99.2 °F)   Resp 16   Ht 1.829 m (6')   Wt (!) 132.5 kg (292 lb)   SpO2 97%   BMI 39.60 kg/m²   Constitutional: Chronically ill in appearance  HENT: Normocephalic, Atraumatic, tympanic membranes are intact and nonerythematous bilaterally, Oropharynx moist without exudates or erythema, Nose normal.   Eyes: PERRLA, EOMI, Conjunctiva normal.  Neck: Supple without meningismus  Lymphatic: No lymphadenopathy noted.   Cardiovascular: Normal heart rate, Normal rhythm, No murmurs, No rubs, No gallops.   Thorax & Lungs: Normal breath sounds, No respiratory  distress, No wheezing, No chest tenderness.   Abdomen: Bowel sounds normal, Soft, No tenderness, no rebound, no guarding, no distention, No masses, No pulsatile masses.   Skin: Open wound to the surgical site on the right foot with granulation tissue as well as some serous and purulent fluid. He has significant induration, erythema, and tenderness in this distribution. This extends to the distal third of the right first metatarsal.  Back: No tenderness, No CVA tenderness.   Extremities: The open wound described above otherwise Atraumatic with symmetric distal pulses, No edema, No tenderness, No cyanosis, No clubbing.   Neurologic: Alert & oriented x 3, cranial nerves II through XII are intact, Normal motor function, Normal sensory function, No focal deficits noted.   Psychiatric: Affect normal, Judgment normal, Mood normal.     RADIOLOGY/PROCEDURES  DX-FOOT-COMPLETE 3+ RIGHT   Final Result      1.  Prior great toe amputation.   2.  RIGHT forefoot soft tissue swelling with ulceration overlying the distal 1st metatarsal head.   3.  Forefoot and ankle soft tissue swelling.   4.  No gross bone destruction, however osteomyelitis is not excluded by plain film alone.            COURSE & MEDICAL DECISION MAKING  Pertinent Labs & Imaging studies reviewed. (See chart for details)  This a 52-year-old male who presents to the emergency department with an open wound after an amputation. His wound does appear to be infected at this time. I did order a Gram stain and culture. The patient received vancomycin for antibiotic coverage. The patient admitted to the hospitalist for antibiotic and medical treatment. I will also consult orthopedics for possible surgical debridement.    FINAL IMPRESSION  1. Open wound right foot status post amputation   Disposition  The patient will be admitted in stable condition.    Electronically signed by: Roe Stoll, 1/15/2018 12:14 PM

## 2018-01-16 ENCOUNTER — HOME CARE VISIT (OUTPATIENT)
Dept: HOME HEALTH SERVICES | Facility: HOME HEALTHCARE | Age: 53
End: 2018-01-16
Payer: MEDICAID

## 2018-01-16 ENCOUNTER — APPOINTMENT (OUTPATIENT)
Dept: RADIOLOGY | Facility: MEDICAL CENTER | Age: 53
DRG: 475 | End: 2018-01-16
Attending: INTERNAL MEDICINE
Payer: MEDICAID

## 2018-01-16 LAB
GLUCOSE BLD-MCNC: 136 MG/DL (ref 65–99)
GLUCOSE BLD-MCNC: 138 MG/DL (ref 65–99)
GLUCOSE BLD-MCNC: 153 MG/DL (ref 65–99)
GLUCOSE BLD-MCNC: 156 MG/DL (ref 65–99)

## 2018-01-16 PROCEDURE — A9270 NON-COVERED ITEM OR SERVICE: HCPCS | Performed by: INTERNAL MEDICINE

## 2018-01-16 PROCEDURE — 770006 HCHG ROOM/CARE - MED/SURG/GYN SEMI*

## 2018-01-16 PROCEDURE — 700117 HCHG RX CONTRAST REV CODE 255: Performed by: INTERNAL MEDICINE

## 2018-01-16 PROCEDURE — 73719 MRI LOWER EXTREMITY W/DYE: CPT | Mod: RT

## 2018-01-16 PROCEDURE — 99232 SBSQ HOSP IP/OBS MODERATE 35: CPT | Performed by: HOSPITALIST

## 2018-01-16 PROCEDURE — 700102 HCHG RX REV CODE 250 W/ 637 OVERRIDE(OP): Performed by: INTERNAL MEDICINE

## 2018-01-16 PROCEDURE — 82962 GLUCOSE BLOOD TEST: CPT | Mod: 91

## 2018-01-16 PROCEDURE — 94760 N-INVAS EAR/PLS OXIMETRY 1: CPT

## 2018-01-16 PROCEDURE — 700111 HCHG RX REV CODE 636 W/ 250 OVERRIDE (IP): Performed by: INTERNAL MEDICINE

## 2018-01-16 PROCEDURE — A9579 GAD-BASE MR CONTRAST NOS,1ML: HCPCS | Performed by: INTERNAL MEDICINE

## 2018-01-16 RX ADMIN — CELECOXIB 200 MG: 200 CAPSULE ORAL at 08:23

## 2018-01-16 RX ADMIN — FAMOTIDINE 20 MG: 20 TABLET, FILM COATED ORAL at 20:18

## 2018-01-16 RX ADMIN — PRAVASTATIN SODIUM 20 MG: 10 TABLET ORAL at 20:18

## 2018-01-16 RX ADMIN — INSULIN HUMAN 2 UNITS: 100 INJECTION, SOLUTION PARENTERAL at 06:07

## 2018-01-16 RX ADMIN — GADODIAMIDE 20 ML: 287 INJECTION INTRAVENOUS at 23:06

## 2018-01-16 RX ADMIN — ASPIRIN 81 MG: 81 TABLET, COATED ORAL at 08:22

## 2018-01-16 RX ADMIN — LINEZOLID 600 MG: 600 TABLET, FILM COATED ORAL at 08:23

## 2018-01-16 RX ADMIN — CELECOXIB 200 MG: 200 CAPSULE ORAL at 20:18

## 2018-01-16 RX ADMIN — CARVEDILOL 3.12 MG: 3.12 TABLET, FILM COATED ORAL at 08:22

## 2018-01-16 RX ADMIN — STANDARDIZED SENNA CONCENTRATE AND DOCUSATE SODIUM 2 TABLET: 8.6; 5 TABLET, FILM COATED ORAL at 20:19

## 2018-01-16 RX ADMIN — HEPARIN SODIUM 5000 UNITS: 5000 INJECTION, SOLUTION INTRAVENOUS; SUBCUTANEOUS at 14:03

## 2018-01-16 RX ADMIN — AMLODIPINE BESYLATE 10 MG: 10 TABLET ORAL at 08:22

## 2018-01-16 RX ADMIN — CARVEDILOL 3.12 MG: 3.12 TABLET, FILM COATED ORAL at 16:46

## 2018-01-16 RX ADMIN — CEFEPIME 2 G: 2 INJECTION, POWDER, FOR SOLUTION INTRAMUSCULAR; INTRAVENOUS at 08:35

## 2018-01-16 RX ADMIN — INSULIN HUMAN 2 UNITS: 100 INJECTION, SOLUTION PARENTERAL at 20:15

## 2018-01-16 RX ADMIN — LINEZOLID 600 MG: 600 TABLET, FILM COATED ORAL at 20:19

## 2018-01-16 RX ADMIN — METRONIDAZOLE 500 MG: 500 TABLET ORAL at 14:03

## 2018-01-16 RX ADMIN — LISINOPRIL 40 MG: 20 TABLET ORAL at 08:23

## 2018-01-16 RX ADMIN — METRONIDAZOLE 500 MG: 500 TABLET ORAL at 06:04

## 2018-01-16 RX ADMIN — HEPARIN SODIUM 5000 UNITS: 5000 INJECTION, SOLUTION INTRAVENOUS; SUBCUTANEOUS at 06:03

## 2018-01-16 ASSESSMENT — COPD QUESTIONNAIRES
DURING THE PAST 4 WEEKS HOW MUCH DID YOU FEEL SHORT OF BREATH: SOME OF THE TIME
COPD SCREENING SCORE: 2
HAVE YOU SMOKED AT LEAST 100 CIGARETTES IN YOUR ENTIRE LIFE: NO/DON'T KNOW
DO YOU EVER COUGH UP ANY MUCUS OR PHLEGM?: NO/ONLY WITH OCCASIONAL COLDS OR INFECTIONS

## 2018-01-16 ASSESSMENT — ACTIVITIES OF DAILY LIVING (ADL): HOME_HEALTH_OASIS: 03

## 2018-01-16 ASSESSMENT — ENCOUNTER SYMPTOMS
FEVER: 0
DIARRHEA: 0
CHILLS: 0
FALLS: 0
PSYCHIATRIC NEGATIVE: 1
HEADACHES: 0
WEAKNESS: 1
ABDOMINAL PAIN: 0
PALPITATIONS: 0
VOMITING: 0
SHORTNESS OF BREATH: 0
NAUSEA: 0

## 2018-01-16 ASSESSMENT — PAIN SCALES - GENERAL
PAINLEVEL_OUTOF10: 0
PAINLEVEL_OUTOF10: 3

## 2018-01-16 ASSESSMENT — LIFESTYLE VARIABLES
DO YOU DRINK ALCOHOL: NO
EVER_SMOKED: NEVER

## 2018-01-16 NOTE — H&P
Hospital Medicine History and Physical    Date of Service  1/15/2018    Chief Complaint  Chief Complaint   Patient presents with   • Toe Pain   • Open Wound       History of Presenting Illness  52 y.o. male who presented 1/15/2018 with toe pain/open wound at the stump site of right great toe amputation. Patient with right intravenous toe which was updated by Dr. Leo on November 10, 2017. Postoperatively infectious disease consultation was obtained and plans were for 6 weeks of ongoing intravenous antibiotic therapy. Patient was subsequent failure to thrive and recurrent hospitalizations in November and December. This included inability to bear weight on his left knee secondary to underlying ostial arthritis/degenerative joint disease. On December 1, 2017 he was discharged to a skilled nursing facility with recommendations to continue intravenous antibiotics and ongoing rehab at the skilled nursing facility. He completed his intravenous antibiotics. He was discharged from the skilled nursing facility to home. Home health care at home. Now with worsening wound and home health care nurse advised presentation to the emergency room for further evaluation. Denies any fevers. There is chills. No other complaints per patient. Lower extremity swelling noted.    Primary Care Physician  Jerad Lomax M.D.    Consultants  Infectious disease - Dr Holley  Orthopedics - Dr Leo  LPS    Code Status  FULL CODE    Review of Systems  Review of Systems   Constitutional: Positive for chills. Negative for diaphoresis, fever and malaise/fatigue.   HENT: Negative for hearing loss and tinnitus.    Eyes: Negative for blurred vision and double vision.   Respiratory: Negative for cough, hemoptysis, sputum production, shortness of breath and wheezing.    Cardiovascular: Positive for leg swelling. Negative for chest pain, palpitations, orthopnea, claudication and PND.   Gastrointestinal: Negative for abdominal pain, blood in stool,  constipation, diarrhea, heartburn, melena, nausea and vomiting.   Genitourinary: Negative for dysuria, flank pain, frequency, hematuria and urgency.   Musculoskeletal: Positive for joint pain. Negative for myalgias and neck pain.   Skin: Negative for itching and rash.   Neurological: Negative for dizziness, weakness and headaches.   Psychiatric/Behavioral: Positive for depression. Negative for suicidal ideas.        Past Medical History  Past Medical History:   Diagnosis Date   • Diabetes (CMS-HCC)    • Hypertension        Surgical History  Past Surgical History:   Procedure Laterality Date   • TOE AMPUTATION Right 11/10/2017    Procedure: TOE AMPUTATION;  Surgeon: Osorio Leo M.D.;  Location: SURGERY [facility];  Service: Orthopedics       Medications  No current facility-administered medications on file prior to encounter.      Current Outpatient Prescriptions on File Prior to Encounter   Medication Sig Dispense Refill   • acetaminophen (TYLENOL) 325 MG Tab Take 3 Tabs by mouth every 6 hours as needed. 30 Tab 0   • aspirin EC 81 MG EC tablet Take 1 Tab by mouth every day. 30 Tab    • celecoxib (CELEBREX) 200 MG Cap Take 1 Cap by mouth 2 Times a Day. 60 Cap    • metformin (GLUCOPHAGE) 500 MG Tab Take 2 Tabs by mouth 2 times a day, with meals. 60 Tab 0   • pravastatin (PRAVACHOL) 20 MG Tab Take 1 Tab by mouth every evening. 30 Tab 11   • lisinopril (PRINIVIL, ZESTRIL) 40 MG tablet Take 1 Tab by mouth every day. 30 Tab    • carvedilol (COREG) 6.25 MG Tab Take 0.5 Tabs by mouth 2 times a day, with meals.     • amlodipine (NORVASC) 10 MG Tab Take 1 Tab by mouth every day. 30 Tab        Family History  Father heart problems    Social History  Social History   Substance Use Topics   • Smoking status: Never Smoker   • Smokeless tobacco: Never Used   • Alcohol use No       Allergies  Allergies   Allergen Reactions   • Pcn [Penicillins] Hives and Rash      Rash & hives  Tolerating Ampicillin + Sulbactam 11/12/17         Physical Exam  Laboratory   Hemodynamics  Temp (24hrs), Av.1 °C (98.8 °F), Min:36.9 °C (98.4 °F), Max:37.3 °C (99.2 °F)   Temperature: 36.9 °C (98.4 °F)  Pulse  Av.9  Min: 74  Max: 96    Blood Pressure: 115/69, NIBP: 120/75      Respiratory      Respiration: 16, Pulse Oximetry: 98 %             Physical Exam   Constitutional: He is oriented to person, place, and time. He appears well-developed and well-nourished. No distress.   HENT:   Head: Normocephalic.   Mouth/Throat: Oropharynx is clear and moist. No oropharyngeal exudate.   Eyes: Conjunctivae and EOM are normal. Pupils are equal, round, and reactive to light. No scleral icterus.   Neck: No JVD present. No thyromegaly present.   Cardiovascular: Normal rate, regular rhythm and normal heart sounds.  Exam reveals no gallop and no friction rub.    No murmur heard.  Pulmonary/Chest: No stridor. No respiratory distress. He has no wheezes. He has no rales.   Abdominal: Soft. Bowel sounds are normal. He exhibits no distension. There is no tenderness. There is no rebound and no guarding.   Musculoskeletal: He exhibits edema and deformity. He exhibits no tenderness.   R great toe stump infection and purulence with surrounding erythema and swelling.    Neurological: He is alert and oriented to person, place, and time. He has normal reflexes. No cranial nerve deficit.   Skin: Skin is warm and dry. He is not diaphoretic.   Psychiatric: He has a normal mood and affect. His behavior is normal. Judgment and thought content normal.       Recent Labs      01/15/18   1156   WBC  8.5   RBC  4.59*   HEMOGLOBIN  12.7*   HEMATOCRIT  37.8*   MCV  82.4   MCH  27.7   MCHC  33.6*   RDW  42.3   PLATELETCT  175   MPV  11.0     Recent Labs      01/15/18   1156   SODIUM  135   POTASSIUM  3.5*   CHLORIDE  102   CO2  25   GLUCOSE  167*   BUN  15   CREATININE  0.77   CALCIUM  8.8     Recent Labs      01/15/18   1156   ALTSGPT  27   ASTSGOT  16   ALKPHOSPHAT  53   TBILIRUBIN  0.5    PREALBUMIN  12.0*   GLUCOSE  167*     Recent Labs      01/15/18   1156   APTT  29.8   INR  0.98             Lab Results   Component Value Date    TROPONINI <0.01 11/20/2017     Urinalysis:    Lab Results  Component Value Date/Time   SPECGRAVITY 1.024 11/11/2017 0509   GLUCOSEUR Negative 11/11/2017 0509   KETONES Negative 11/11/2017 0509   NITRITE Negative 11/11/2017 0509   WBCURINE 5-10 (A) 11/11/2017 0509   RBCURINE 2-5 (A) 11/11/2017 0509   BACTERIA Negative 11/11/2017 0509   EPITHELCELL Moderate (A) 11/11/2017 0509        Imaging  Reviewed   Assessment/Plan     I anticipate this patient will require at least two midnights for appropriate medical management, necessitating inpatient admission.    Lower limb amputation, great toe (CMS-HCC)- (present on admission)   Assessment & Plan    R great toe amputation with Dr Leo on 11/10/2017  Subsequent infection / OM  Seen by ID, prolonged IV abx  Was at SNF, then home  Now with base / stump ulceration, purulence  Admit to orthopedics  ID consulted, Dr Holley to evaluate  Abx per ID team  Orthopedics consulted, Dr Leo to evaluate patient  Surgical interventions per Orthopedics team  LPS consult  MRI requested        HLD (hyperlipidemia)- (present on admission)   Assessment & Plan    Pravastatin        GERD (gastroesophageal reflux disease)- (present on admission)   Assessment & Plan    Pepcid        Hypertension- (present on admission)   Assessment & Plan    Continue carvedilol, lisinopril and amlodipine        Type 2 diabetes mellitus (CMS-HCC)- (present on admission)   Assessment & Plan    Uncontrolled with hyperglycemia  Holding oral hypoglycemic agents  SSI for now  Titrate to achieve normoglycemic control        Obesity- (present on admission)   Assessment & Plan    Body mass index is 39.6 kg/m².            VTE prophylaxis: SCD and SC heparin .

## 2018-01-16 NOTE — DISCHARGE PLANNING
[facility] Please write home care orders upon discharge to home for continuum of care.Please contact the[facility] Coordinator at  /[phone] if there are any questions.

## 2018-01-16 NOTE — CONSULTS
INFECTIOUS DISEASES INPATIENT CONSULT NOTE     Date of Service:1/15/2018    Consult Requested By: Dr Baltazar M.D.    Reason for Consultation: diabetic foot ulcer at surgical site    History of Present Illness:   Sebastián Castro is a 52 y.o. diabetic male admitted 1/15/2018 pain/open wound at the site of right great toe amputation.  Surgery was November 10, 2017. Known to ID from prior admissions. He completed 6 weeks of intravenous ertapenem post-op. Stop date 12/22/2017.  Cultures at the time of surgery were Proteus and group B strep.  He had subsequent failure to thrive and recurrent hospitalizations in November and December due to inability to bear weight on his left knee from a fall, torn menisci, and degenerative joint disease. On December 1, 2017 he was discharged to a skilled nursing facility with recommendations to continue intravenous antibiotics and ongoing rehab at the skilled nursing facility. He completed his intravenous antibiotics. While at SNF he noted increased drainage from his prior surgical site-yellowish. Home health noted the wound-in Jan it measured about 0.5 cm.  Subsequently it broke open and was cultured. Cultures were +enterococcus. The wound continued to worsen so he was sent to ED. +erythema and tenderness.  Denies any fevers, N/V/D.  Wound pictures reviewed. No antibiotics have been started yet  Infectious Diseases consulted for antibiotic recommendation and management    Review Of Systems:  All other systems are reviewed and are negative     PMH:   Past Medical History:   Diagnosis Date   • Diabetes (CMS-[facility])    • Hypertension          PSH:  Past Surgical History:   Procedure Laterality Date   • TOE AMPUTATION Right 11/10/2017    Procedure: TOE AMPUTATION;  Surgeon: Osorio Leo M.D.;  Location: SURGERY [facility];  Service: Orthopedics       FAMILY HX:  Denied    SOCIAL HX:  Social History     Social History   • Marital status:      Spouse name: N/A   • Number of children:  N/A   • Years of education: N/A     Occupational History   • Not on file.     Social History Main Topics   • Smoking status: Never Smoker   • Smokeless tobacco: Never Used   • Alcohol use No   • Drug use: No   • Sexual activity: Not on file     Other Topics Concern   • Not on file     Social History Narrative   • No narrative on file     History   Smoking Status   • Never Smoker   Smokeless Tobacco   • Never Used     History   Alcohol Use No       Allergies/Intolerances:  Allergies   Allergen Reactions   • Pcn [Penicillins] Hives and Rash      Rash & hives  Tolerating Ampicillin + Sulbactam 11/12/17       History reviewed with the patient    Other Current Medications:    Current Facility-Administered Medications:   •  amlodipine (NORVASC) tablet 10 mg, 10 mg, Oral, DAILY, Darien Ledesma M.D., 10 mg at 01/15/18 1831  •  aspirin EC (ECOTRIN) tablet 81 mg, 81 mg, Oral, DAILY, Darien Ledesma M.D.  •  carvedilol (COREG) tablet 3.125 mg, 3.125 mg, Oral, BID WITH MEALS, Darien Ledesma M.D., 3.125 mg at 01/15/18 1831  •  celecoxib (CELEBREX) capsule 200 mg, 200 mg, Oral, BID, Darien Ledesma M.D., 200 mg at 01/15/18 2037  •  famotidine (PEPCID) tablet 20 mg, 20 mg, Oral, Q EVENING, Darien Ledesma M.D., 20 mg at 01/15/18 2037  •  lisinopril (PRINIVIL) tablet 40 mg, 40 mg, Oral, DAILY, Darien Ledesma M.D.  •  pravastatin (PRAVACHOL) tablet 20 mg, 20 mg, Oral, Q EVENING, Darien Ledesma M.D., 20 mg at 01/15/18 2037  •  senna-docusate (PERICOLACE or SENOKOT S) 8.6-50 MG per tablet 2 Tab, 2 Tab, Oral, BID, 2 Tab at 01/15/18 2037 **AND** polyethylene glycol/lytes (MIRALAX) PACKET 1 Packet, 1 Packet, Oral, QDAY PRN **AND** magnesium hydroxide (MILK OF MAGNESIA) suspension 30 mL, 30 mL, Oral, QDAY PRN **AND** bisacodyl (DULCOLAX) suppository 10 mg, 10 mg, Rectal, QDAY PRN, Darien Ledesma M.D.  •  Respiratory Care per Protocol, , Nebulization, Continuous RT, Darien Ledesma M.D.  •  heparin injection 5,000 Units, 5,000 Units, Subcutaneous, Q8HRS, Darien  JEFFREY Ledesma, 5,000 Units at 01/16/18 0603  •  acetaminophen (TYLENOL) tablet 650 mg, 650 mg, Oral, Q6HRS PRN, Darien Ledesma M.D.  •  Notify provider if pain remains uncontrolled, , , CONTINUOUS **AND** Use the numeric rating scale (NRS-11) on regular floors and Critical-Care Pain Observation Tool (CPOT) on ICUs/Trauma to assess pain, , , CONTINUOUS **AND** Pulse Ox (Oximetry), , , CONTINUOUS **AND** Pharmacy Consult Request ...Pain Management Review, , Other, PRN **AND** If patient difficult to arouse and/or has respiratory depression, stop any opiates that are currently infusing and call a Rapid Response., , , CONTINUOUS **AND** oxycodone immediate-release (ROXICODONE) tablet 2.5 mg, 2.5 mg, Oral, Q3HRS PRN **AND** oxycodone immediate-release (ROXICODONE) tablet 5 mg, 5 mg, Oral, Q3HRS PRN, 5 mg at 01/15/18 2250 **AND** morphine (pf) 4 mg/ml injection 2 mg, 2 mg, Intravenous, Q3HRS PRN, Darien Ledesma M.D.  •  insulin regular (HUMULIN R) injection 2-9 Units, 2-9 Units, Subcutaneous, 4X/DAY ACHS, 2 Units at 01/16/18 0607 **AND** Accu-Chek ACHS, , , Q AC AND BEDTIME(S) **AND** NOTIFY MD and PharmD, , , Once **AND** glucose 4 g chewable tablet 16 g, 16 g, Oral, Q15 MIN PRN **AND** dextrose 50% (D50W) injection 25 mL, 25 mL, Intravenous, Q15 MIN PRN, Darien Ledesma M.D.  •  ondansetron (ZOFRAN) syringe/vial injection 4 mg, 4 mg, Intravenous, Q4HRS PRN, Darien Ledesma M.D.  •  ondansetron (ZOFRAN ODT) dispertab 4 mg, 4 mg, Oral, Q4HRS PRN, Darien Ledesma M.D.  •  linezolid (ZYVOX) tablet 600 mg, 600 mg, Oral, Q12HRS, Cristy Holley M.D., 600 mg at 01/15/18 2037  •  cefepime (MAXIPIME) syringe 2 g, 2 g, Intravenous, Q12HRS, Cristy Holley M.D., 2 g at 01/15/18 1709  •  metronidazole (FLAGYL) tablet 500 mg, 500 mg, Oral, Q8HRS, Cristy Holley M.D., 500 mg at 01/16/18 0604      Most Recent Vital Signs:  /75   Pulse 65   Temp 36.7 °C (98 °F)   Resp 16   Ht 1.829 m (6')   Wt (!) 132.5 kg (292 lb)   SpO2 95%    BMI 39.60 kg/m²   Temp  Av.9 °C (98.4 °F)  Min: 36.6 °C (97.9 °F)  Max: 37.3 °C (99.2 °F)    Physical Exam:  General: Obese well-appearing, well nourished no acute distress  HEENT: NCAT, PERRLA, sclera anicteric, PERRL, EOMI,  no oral lesions Dentition fair  Neck: supple, no lymphadenopathy  Chest: CTAB, no r/r/w, unlabored.  Cardiac: RRR, normal S1 S2  Abdomen: + bowel sounds, soft, non-tender, non-distended  Extremities: No cyanosis, clubbing. +edema, 2+ pulses erythema at great toe amp wound has splei and nnow is over 1 cm with eschar, exposed bone, and purulent drainge  Skin: no rashes   Neuro: Alert and oriented times 3, Speech fluent CN intact PATHAK    Pertinent Lab Results:  Recent Labs      01/15/18   1156   WBC  8.5      Recent Labs      01/15/18   1156   HEMOGLOBIN  12.7*   HEMATOCRIT  37.8*   MCV  82.4   MCH  27.7   PLATELETCT  175         Recent Labs      01/15/18   1156   SODIUM  135   POTASSIUM  3.5*   CHLORIDE  102   CO2  25   CREATININE  0.77        Recent Labs      01/15/18   1156   ALBUMIN  3.6        Pertinent Micro:  Results     Procedure Component Value Units Date/Time    GRAM STAIN [[id number]] Collected:  01/15/18 1216    Order Status:  Completed Specimen:  Wound Updated:  01/15/18 182     Significant Indicator .     Source WND     Site RIGHT FOOT     Gram Stain Result --     Moderate WBCs.  Many Gram positive cocci.  Many Gram positive rods.      CULTURE WOUND W/ GRAM STAIN [[id number]] Collected:  01/15/18 121    Order Status:  Completed Specimen:  Blood from Right Foot Updated:  01/15/18 1227        Blood Culture   Date Value Ref Range Status   11/10/2017 No growth after 5 days of incubation.  Final        Studies:    IMPRESSION:   Surgical site infection  Diabetic foot ulceration   DM        PLAN:   Diabetic foot ulcer at prior amputation site, right great toe    Afebrile  No leukocytosis  S/p right great toe amputation with Dr Leo on 11/10/2017  S/p 6 week IV abx  Wound pictures  show failure to completely heal with eschar  Recent wound culture with resistant entercoccus  Start Zyvox, cefepime, flagyl  MRI if feasible for extent of OM-clinical OM  LPS eval  SHAYAN OK in 11/2017  Duration abx per surgical plan and findings         Type 2 diabetes mellitus    Keep BS under 150 to help control current infection  Hgb A1C in 11/2017 =9.6        Plan of care discussed with Dr Ledesma Will continue to follow    Cristy Holley M.D.

## 2018-01-16 NOTE — CARE PLAN
Problem: Pain Management  Goal: Pain level will decrease to patient's comfort goal    Intervention: Follow pain managment plan developed in collaboration with patient and Interdisciplinary Team  Ensure patient pain is adequately managed and patient is comfortable

## 2018-01-16 NOTE — CONSULTS
1/15/2018    He underwent a right great toe amputation two months ago.  Initially did well, but just developed worsening blackening, drainage, and pain at the surgical site.  No fevers or chills.  Sharp and achy pain with ambulation or touch.  Improved with rest and elevation.    Past Medical History:   Diagnosis Date   • Diabetes (CMS-HCC)    • Hypertension        Past Surgical History:   Procedure Laterality Date   • TOE AMPUTATION Right 11/10/2017    Procedure: TOE AMPUTATION;  Surgeon: Osorio Leo M.D.;  Location: SURGERY [facility];  Service: Orthopedics       Medications  No current facility-administered medications on file prior to encounter.      Current Outpatient Prescriptions on File Prior to Encounter   Medication Sig Dispense Refill   • acetaminophen (TYLENOL) 325 MG Tab Take 3 Tabs by mouth every 6 hours as needed. 30 Tab 0   • aspirin EC 81 MG EC tablet Take 1 Tab by mouth every day. 30 Tab    • celecoxib (CELEBREX) 200 MG Cap Take 1 Cap by mouth 2 Times a Day. 60 Cap    • metformin (GLUCOPHAGE) 500 MG Tab Take 2 Tabs by mouth 2 times a day, with meals. 60 Tab 0   • pravastatin (PRAVACHOL) 20 MG Tab Take 1 Tab by mouth every evening. 30 Tab 11   • lisinopril (PRINIVIL, ZESTRIL) 40 MG tablet Take 1 Tab by mouth every day. 30 Tab    • carvedilol (COREG) 6.25 MG Tab Take 0.5 Tabs by mouth 2 times a day, with meals.     • amlodipine (NORVASC) 10 MG Tab Take 1 Tab by mouth every day. 30 Tab        Allergies  Pcn [penicillins]    ROS  All other systems were reviewed and found to be negative    No family history on file.    Social History     Social History   • Marital status:      Spouse name: N/A   • Number of children: N/A   • Years of education: N/A     Social History Main Topics   • Smoking status: Never Smoker   • Smokeless tobacco: Never Used   • Alcohol use No   • Drug use: No   • Sexual activity: Not on file     Other Topics Concern   • Not on file     Social History Narrative   • No  narrative on file       Physical Exam  Vitals  Blood pressure 131/61, pulse 76, temperature 37.3 °C (99.2 °F), resp. rate 16, height 1.829 m (6'), weight (!) 132.5 kg (292 lb), SpO2 95 %.  General: Well Developed, Well Nourished, laying supine   HEENT: Normocephalic, atraumatic  Eyes: Anicteric   Neck: Supple, nontender, no masses  Lungs: Non labored breathing  Heart: No cyanosis.  Extremities are warm and well perfused.   Abdomen: Soft, NT, ND  Pelvis: Stable to AP and Lateral Compression  Skin: Open wound with purulence and foul odor at prior amp site  Extremities: No swelling or tenderness at major joints  Neuro: Somewhat diminished SLT in stocking glove distribution  Vascular: Strong palpable DP pulse bilaterally    Radiographs:  DX-FOOT-COMPLETE 3+ RIGHT   Final Result      1.  Prior great toe amputation.   2.  RIGHT forefoot soft tissue swelling with ulceration overlying the distal 1st metatarsal head.   3.  Forefoot and ankle soft tissue swelling.   4.  No gross bone destruction, however osteomyelitis is not excluded by plain film alone.          Laboratory Values  Recent Labs      01/15/18   1156   WBC  8.5   RBC  4.59*   HEMOGLOBIN  12.7*   HEMATOCRIT  37.8*   MCV  82.4   MCH  27.7   MCHC  33.6*   RDW  42.3   PLATELETCT  175   MPV  11.0     Recent Labs      01/15/18   1156   SODIUM  135   POTASSIUM  3.5*   CHLORIDE  102   CO2  25   GLUCOSE  167*   BUN  15     Recent Labs      01/15/18   1156   APTT  29.8   INR  0.98         Impression:  R foot infection  Prior R great toe amputation  DMII  HTN    -LPS consult  -Foot MRI  -Will discuss surgical options once MRI completed

## 2018-01-16 NOTE — THERAPY
PT eval order received. Pt pending further imagining and consults for definitive POC. Will defer PT eval until this is established.

## 2018-01-16 NOTE — PROGRESS NOTES
[employer] has been contacted attention delivery of diabetic boot size male 12 for pt's R LE. If any questions you can reach [employer] at ext 3 [phone].

## 2018-01-16 NOTE — PROGRESS NOTES
Attention update on diabetic boot this size is currently out of stock both [employer] and [facility] [employer] have been contacted. [employer] said they will have one delivered by tomorrow. They are pending one being they had to order it over nite.

## 2018-01-16 NOTE — ASSESSMENT & PLAN NOTE
"R great toe amputation with Dr Leo on 11/10/2017 s/p 6 weeks abx. Subsequent infection /OM. Now s/p \"right failed great toe amputation with metatarsal head osteomyelitis\" on 1/17. S/p PICC line on 1/19.  - ID and surgery following, greatly appreciate  - continue zyvox, unasyn  - LPS following, greatly appreciate  - pain control  - SNF referral placed    MRI foot:  First metatarsal head edema and enhancement is compatible with osteomyelitis, seen at the deep margin of deep skin ulceration at the amputation stump  Great toe amputation  Cellulitis, granulation tissue, phlegmonous change. No abscess  Medial first metatarsal head sesamoid edema and enhancement could be reactive or from osteomyelitis  "

## 2018-01-16 NOTE — PROGRESS NOTES
Patient arrived to the unit from the Ed. 2 RN skin check complete with Amanda PÉREZ. Skin is intact with exception to R great toe wound, redness the panus and groin area. No skin breakdowns noted. Patient ambulated to the bed from the [facility] without the assistance of FWW steadily. Safety measured in place call light within reach POC discussed and hourly rounding in place

## 2018-01-16 NOTE — PROGRESS NOTES
Infectious Disease Progress Note    Author: Cristy Holley M.D. Date & Time of service: 2018  11:50 AM    Chief Complaint:  diabetic foot ulcer at surgical site      Interval History:  52 y.o. diabetic male admitted 1/15/2018 pain/open wound at the site of right great toe amputation   AF WBC 8.5 seen with LPS Tolerating abx well  Labs Reviewed, Medications Reviewed, Radiology Reviewed and Wound Reviewed.    Review of Systems:  Review of Systems   Constitutional: Negative for chills and fever.   Gastrointestinal: Negative for abdominal pain, diarrhea, nausea and vomiting.   Musculoskeletal: Positive for joint pain.   All other systems reviewed and are negative.      Hemodynamics:  Temp (24hrs), Av.7 °C (98.1 °F), Min:36.6 °C (97.9 °F), Max:36.9 °C (98.4 °F)  Temperature: 36.7 °C (98 °F)  Pulse  Av.3  Min: 65  Max: 96   Blood Pressure: 136/68, NIBP: 120/75       Physical Exam:  Physical Exam   Constitutional: He is oriented to person, place, and time. He appears well-developed. No distress.   Obese   HENT:   Head: Normocephalic and atraumatic.   Eyes: EOM are normal. Pupils are equal, round, and reactive to light.   Neck: Neck supple.   Cardiovascular: Normal rate.    Pulmonary/Chest: Effort normal and breath sounds normal. No respiratory distress.   Abdominal: Soft. He exhibits no distension. There is no tenderness.   Musculoskeletal: He exhibits edema and tenderness.   1-2 cm ulceration with eschar at right great toe amp site  Edema and erythema to forefoot  Probes to bone   Neurological: He is alert and oriented to person, place, and time.   Skin: There is erythema.   Nursing note and vitals reviewed.      Meds:    Current Facility-Administered Medications:   •  amlodipine  •  aspirin  •  carvedilol  •  celecoxib  •  famotidine  •  lisinopril  •  pravastatin  •  senna-docusate **AND** polyethylene glycol/lytes **AND** magnesium hydroxide **AND** bisacodyl  •  Respiratory Care per Protocol  •   heparin  •  acetaminophen  •  Notify provider if pain remains uncontrolled **AND** Use the numeric rating scale (NRS-11) on regular floors and Critical-Care Pain Observation Tool (CPOT) on ICUs/Trauma to assess pain **AND** Pulse Ox (Oximetry) **AND** Pharmacy Consult Request **AND** If patient difficult to arouse and/or has respiratory depression, stop any opiates that are currently infusing and call a Rapid Response. **AND** oxycodone immediate-release **AND** oxycodone immediate-release **AND** morphine injection  •  insulin regular **AND** Accu-Chek ACHS **AND** NOTIFY MD and PharmD **AND** glucose 4 g **AND** dextrose 50%  •  ondansetron  •  ondansetron  •  linezolid  •  cefepime  •  metronidazole    Labs:  Recent Labs      01/15/18   1156   WBC  8.5   RBC  4.59*   HEMOGLOBIN  12.7*   HEMATOCRIT  37.8*   MCV  82.4   MCH  27.7   RDW  42.3   PLATELETCT  175   MPV  11.0   NEUTSPOLYS  57.30   LYMPHOCYTES  30.80   MONOCYTES  7.30   EOSINOPHILS  3.90   BASOPHILS  0.50     Recent Labs      01/15/18   1156   SODIUM  135   POTASSIUM  3.5*   CHLORIDE  102   CO2  25   GLUCOSE  167*   BUN  15     Recent Labs      01/15/18   1156   ALBUMIN  3.6   TBILIRUBIN  0.5   ALKPHOSPHAT  53   TOTPROTEIN  6.6   ALTSGPT  27   ASTSGOT  16   CREATININE  0.77       Imaging:  Dx-foot-complete 3+ Right    Result Date: 1/15/2018  1/15/2018 1:07 PM HISTORY/REASON FOR EXAM:  Atraumatic Pain/Swelling/Deformity Right foot pain s/p Great toe amputation. Pt had a recent toe amputation on the right foot. Home health nurse checked his foot this morning and sts his open wound is full thickness and needed to come in TECHNIQUE/EXAM DESCRIPTION AND NUMBER OF VIEWS: 3 views of the RIGHT foot. COMPARISON:  11/10/2017 FINDINGS: Prior amputation of great toe at the level of the metatarsophalangeal joint. Focal soft tissue swelling over the distal 1st metatarsal.  Lucency within soft tissues consistent with ulceration.  No soft tissue gas or radiopaque  foreign body.  No focal bony destruction. Degenerative change of the midfoot. Calcaneal enthesophytes present. Forefoot and ankle soft tissue swelling.     1.  Prior great toe amputation. 2.  RIGHT forefoot soft tissue swelling with ulceration overlying the distal 1st metatarsal head. 3.  Forefoot and ankle soft tissue swelling. 4.  No gross bone destruction, however osteomyelitis is not excluded by plain film alone.      Micro:  Results     Procedure Component Value Units Date/Time    GRAM STAIN [[id number]] Collected:  01/15/18 1216    Order Status:  Completed Specimen:  Wound Updated:  01/15/18 1821     Significant Indicator .     Source WND     Site RIGHT FOOT     Gram Stain Result --     Moderate WBCs.  Many Gram positive cocci.  Many Gram positive rods.      CULTURE WOUND W/ GRAM STAIN [[id number]] Collected:  01/15/18 1216    Order Status:  Completed Specimen:  Blood from Right Foot Updated:  01/15/18 1227          Assessment:  Active Hospital Problems    Diagnosis   • Lower limb amputation, great toe (CMS-HCC) [Z89.419]   • GERD (gastroesophageal reflux disease) [K21.9]   • HLD (hyperlipidemia) [E78.5]   • Hypertension [I10]   • Type 2 diabetes mellitus (CMS-HCC) [E11.9]   • Obesity [E66.9]       Plan:  Diabetic foot ulcer at prior amputation site, right great toe    Afebrile  No leukocytosis  S/p right great toe amputation with Dr Leo on 11/10/2017  S/p 6 week IV abx  Wound pictures show failure to completely heal with eschar  Recent wound culture with resistant entercoccus  Continue Zyvox, cefepime, flagyl  MRI if feasible for extent of OM-clinical OM  LPS appreciated  SHAYAN OK in 11/2017  Duration abx per surgical plan and findings -anticipate 6 weeks IV abx post-op     Type 2 diabetes mellitus    Keep BS under 150 to help control current infection  Hgb A1C in 11/2017 =9.6        Plan of care discussed with LPS

## 2018-01-17 LAB
ANION GAP SERPL CALC-SCNC: 8 MMOL/L (ref 0–11.9)
BACTERIA WND AEROBE CULT: ABNORMAL
BUN SERPL-MCNC: 11 MG/DL (ref 8–22)
CALCIUM SERPL-MCNC: 8.8 MG/DL (ref 8.5–10.5)
CHLORIDE SERPL-SCNC: 103 MMOL/L (ref 96–112)
CO2 SERPL-SCNC: 24 MMOL/L (ref 20–33)
CREAT SERPL-MCNC: 0.74 MG/DL (ref 0.5–1.4)
ERYTHROCYTE [DISTWIDTH] IN BLOOD BY AUTOMATED COUNT: 42.3 FL (ref 35.9–50)
GLUCOSE BLD-MCNC: 127 MG/DL (ref 65–99)
GLUCOSE BLD-MCNC: 143 MG/DL (ref 65–99)
GLUCOSE BLD-MCNC: 144 MG/DL (ref 65–99)
GLUCOSE BLD-MCNC: 194 MG/DL (ref 65–99)
GLUCOSE SERPL-MCNC: 147 MG/DL (ref 65–99)
GRAM STN SPEC: ABNORMAL
GRAM STN SPEC: NORMAL
HCT VFR BLD AUTO: 37.1 % (ref 42–52)
HGB BLD-MCNC: 12.8 G/DL (ref 14–18)
MCH RBC QN AUTO: 28.3 PG (ref 27–33)
MCHC RBC AUTO-ENTMCNC: 34.5 G/DL (ref 33.7–35.3)
MCV RBC AUTO: 81.9 FL (ref 81.4–97.8)
PLATELET # BLD AUTO: 173 K/UL (ref 164–446)
PMV BLD AUTO: 10.9 FL (ref 9–12.9)
POTASSIUM SERPL-SCNC: 3.7 MMOL/L (ref 3.6–5.5)
RBC # BLD AUTO: 4.53 M/UL (ref 4.7–6.1)
SIGNIFICANT IND 70042: ABNORMAL
SIGNIFICANT IND 70042: NORMAL
SITE SITE: ABNORMAL
SITE SITE: NORMAL
SODIUM SERPL-SCNC: 135 MMOL/L (ref 135–145)
SOURCE SOURCE: ABNORMAL
SOURCE SOURCE: NORMAL
WBC # BLD AUTO: 6.3 K/UL (ref 4.8–10.8)

## 2018-01-17 PROCEDURE — 0Y6M0Z9 DETACHMENT AT RIGHT FOOT, PARTIAL 1ST RAY, OPEN APPROACH: ICD-10-PCS | Performed by: FAMILY MEDICINE

## 2018-01-17 PROCEDURE — 306589 SLEEVE,VASO CALF LARGE: Performed by: HOSPITALIST

## 2018-01-17 PROCEDURE — 700111 HCHG RX REV CODE 636 W/ 250 OVERRIDE (IP): Performed by: INTERNAL MEDICINE

## 2018-01-17 PROCEDURE — 80048 BASIC METABOLIC PNL TOTAL CA: CPT

## 2018-01-17 PROCEDURE — 82962 GLUCOSE BLOOD TEST: CPT

## 2018-01-17 PROCEDURE — 770021 HCHG ROOM/CARE - ISO PRIVATE

## 2018-01-17 PROCEDURE — 160009 HCHG ANES TIME/MIN: Performed by: ORTHOPAEDIC SURGERY

## 2018-01-17 PROCEDURE — 87206 SMEAR FLUORESCENT/ACID STAI: CPT

## 2018-01-17 PROCEDURE — 500440 HCHG DRESSING, STERILE ROLL (KERLIX): Performed by: ORTHOPAEDIC SURGERY

## 2018-01-17 PROCEDURE — 87077 CULTURE AEROBIC IDENTIFY: CPT | Mod: 91

## 2018-01-17 PROCEDURE — 85027 COMPLETE CBC AUTOMATED: CPT

## 2018-01-17 PROCEDURE — A9270 NON-COVERED ITEM OR SERVICE: HCPCS | Performed by: INTERNAL MEDICINE

## 2018-01-17 PROCEDURE — 88305 TISSUE EXAM BY PATHOLOGIST: CPT

## 2018-01-17 PROCEDURE — 160035 HCHG PACU - 1ST 60 MINS PHASE I: Performed by: ORTHOPAEDIC SURGERY

## 2018-01-17 PROCEDURE — 500881 HCHG PACK, EXTREMITY: Performed by: ORTHOPAEDIC SURGERY

## 2018-01-17 PROCEDURE — 87186 SC STD MICRODIL/AGAR DIL: CPT

## 2018-01-17 PROCEDURE — 99232 SBSQ HOSP IP/OBS MODERATE 35: CPT | Performed by: HOSPITALIST

## 2018-01-17 PROCEDURE — 700102 HCHG RX REV CODE 250 W/ 637 OVERRIDE(OP): Performed by: INTERNAL MEDICINE

## 2018-01-17 PROCEDURE — 87106 FUNGI IDENTIFICATION YEAST: CPT

## 2018-01-17 PROCEDURE — 160002 HCHG RECOVERY MINUTES (STAT): Performed by: ORTHOPAEDIC SURGERY

## 2018-01-17 PROCEDURE — 87075 CULTR BACTERIA EXCEPT BLOOD: CPT

## 2018-01-17 PROCEDURE — 87015 SPECIMEN INFECT AGNT CONCNTJ: CPT | Mod: 91

## 2018-01-17 PROCEDURE — 87116 MYCOBACTERIA CULTURE: CPT

## 2018-01-17 PROCEDURE — 160036 HCHG PACU - EA ADDL 30 MINS PHASE I: Performed by: ORTHOPAEDIC SURGERY

## 2018-01-17 PROCEDURE — 88311 DECALCIFY TISSUE: CPT

## 2018-01-17 PROCEDURE — 87205 SMEAR GRAM STAIN: CPT

## 2018-01-17 PROCEDURE — 36415 COLL VENOUS BLD VENIPUNCTURE: CPT

## 2018-01-17 PROCEDURE — 87070 CULTURE OTHR SPECIMN AEROBIC: CPT

## 2018-01-17 PROCEDURE — 160048 HCHG OR STATISTICAL LEVEL 1-5: Performed by: ORTHOPAEDIC SURGERY

## 2018-01-17 PROCEDURE — A9270 NON-COVERED ITEM OR SERVICE: HCPCS

## 2018-01-17 PROCEDURE — 160022 HCHG BLOCK: Performed by: ORTHOPAEDIC SURGERY

## 2018-01-17 PROCEDURE — 700102 HCHG RX REV CODE 250 W/ 637 OVERRIDE(OP)

## 2018-01-17 PROCEDURE — 700111 HCHG RX REV CODE 636 W/ 250 OVERRIDE (IP)

## 2018-01-17 PROCEDURE — 160039 HCHG SURGERY MINUTES - EA ADDL 1 MIN LEVEL 3: Performed by: ORTHOPAEDIC SURGERY

## 2018-01-17 PROCEDURE — 700101 HCHG RX REV CODE 250

## 2018-01-17 PROCEDURE — 501838 HCHG SUTURE GENERAL: Performed by: ORTHOPAEDIC SURGERY

## 2018-01-17 PROCEDURE — 87102 FUNGUS ISOLATION CULTURE: CPT

## 2018-01-17 PROCEDURE — 160028 HCHG SURGERY MINUTES - 1ST 30 MINS LEVEL 3: Performed by: ORTHOPAEDIC SURGERY

## 2018-01-17 RX ORDER — LIDOCAINE HYDROCHLORIDE 10 MG/ML
INJECTION, SOLUTION INFILTRATION; PERINEURAL
Status: COMPLETED
Start: 2018-01-17 | End: 2018-01-17

## 2018-01-17 RX ORDER — OXYCODONE HCL 5 MG/5 ML
SOLUTION, ORAL ORAL
Status: COMPLETED
Start: 2018-01-17 | End: 2018-01-17

## 2018-01-17 RX ADMIN — METRONIDAZOLE 500 MG: 500 TABLET ORAL at 00:13

## 2018-01-17 RX ADMIN — LINEZOLID 600 MG: 600 TABLET, FILM COATED ORAL at 20:06

## 2018-01-17 RX ADMIN — OXYCODONE HYDROCHLORIDE 5 MG: 5 TABLET ORAL at 08:42

## 2018-01-17 RX ADMIN — CEFEPIME 2 G: 2 INJECTION, POWDER, FOR SOLUTION INTRAMUSCULAR; INTRAVENOUS at 08:36

## 2018-01-17 RX ADMIN — AMLODIPINE BESYLATE 10 MG: 10 TABLET ORAL at 08:32

## 2018-01-17 RX ADMIN — CEFEPIME 2 G: 2 INJECTION, POWDER, FOR SOLUTION INTRAMUSCULAR; INTRAVENOUS at 20:07

## 2018-01-17 RX ADMIN — OXYCODONE HYDROCHLORIDE 5 MG: 5 SOLUTION ORAL at 14:50

## 2018-01-17 RX ADMIN — CARVEDILOL 3.12 MG: 3.12 TABLET, FILM COATED ORAL at 08:33

## 2018-01-17 RX ADMIN — CEFEPIME 2 G: 2 INJECTION, POWDER, FOR SOLUTION INTRAMUSCULAR; INTRAVENOUS at 00:13

## 2018-01-17 RX ADMIN — INSULIN HUMAN 2 UNITS: 100 INJECTION, SOLUTION PARENTERAL at 20:27

## 2018-01-17 RX ADMIN — CELECOXIB 200 MG: 200 CAPSULE ORAL at 20:07

## 2018-01-17 RX ADMIN — HEPARIN SODIUM 5000 UNITS: 5000 INJECTION, SOLUTION INTRAVENOUS; SUBCUTANEOUS at 00:14

## 2018-01-17 RX ADMIN — METRONIDAZOLE 500 MG: 500 TABLET ORAL at 16:58

## 2018-01-17 RX ADMIN — LIDOCAINE HYDROCHLORIDE: 10 INJECTION, SOLUTION INFILTRATION; PERINEURAL at 12:30

## 2018-01-17 RX ADMIN — METRONIDAZOLE 500 MG: 500 TABLET ORAL at 06:06

## 2018-01-17 RX ADMIN — LINEZOLID 600 MG: 600 TABLET, FILM COATED ORAL at 08:37

## 2018-01-17 RX ADMIN — MORPHINE SULFATE 2 MG: 4 INJECTION INTRAVENOUS at 20:22

## 2018-01-17 RX ADMIN — CELECOXIB 200 MG: 200 CAPSULE ORAL at 08:37

## 2018-01-17 RX ADMIN — ONDANSETRON 4 MG: 2 INJECTION INTRAMUSCULAR; INTRAVENOUS at 16:58

## 2018-01-17 RX ADMIN — FAMOTIDINE 20 MG: 20 TABLET, FILM COATED ORAL at 20:07

## 2018-01-17 RX ADMIN — METRONIDAZOLE 500 MG: 500 TABLET ORAL at 20:22

## 2018-01-17 RX ADMIN — FENTANYL CITRATE 25 MCG: 50 INJECTION, SOLUTION INTRAMUSCULAR; INTRAVENOUS at 14:57

## 2018-01-17 RX ADMIN — PRAVASTATIN SODIUM 20 MG: 10 TABLET ORAL at 20:07

## 2018-01-17 RX ADMIN — CARVEDILOL 3.12 MG: 3.12 TABLET, FILM COATED ORAL at 16:59

## 2018-01-17 ASSESSMENT — PAIN SCALES - GENERAL
PAINLEVEL_OUTOF10: 5
PAINLEVEL_OUTOF10: 2
PAINLEVEL_OUTOF10: 5
PAINLEVEL_OUTOF10: 0
PAINLEVEL_OUTOF10: 5
PAINLEVEL_OUTOF10: 2
PAINLEVEL_OUTOF10: 4
PAINLEVEL_OUTOF10: 7
PAINLEVEL_OUTOF10: 2
PAINLEVEL_OUTOF10: 0
PAINLEVEL_OUTOF10: 0
PAINLEVEL_OUTOF10: 2
PAINLEVEL_OUTOF10: 6

## 2018-01-17 ASSESSMENT — ENCOUNTER SYMPTOMS
PSYCHIATRIC NEGATIVE: 1
SHORTNESS OF BREATH: 0
PALPITATIONS: 0
ABDOMINAL PAIN: 0
HEADACHES: 0
WEAKNESS: 0
DIARRHEA: 0
FALLS: 0
CHILLS: 0
NAUSEA: 0
VOMITING: 0
FEVER: 0

## 2018-01-17 NOTE — CARE PLAN
Problem: Safety  Goal: Will remain free from injury    Intervention: Provide assistance with mobility  asssure patient is free from injury whole monitoring ambulation. Patient has HX of weakness from  previous knee injuries . Advised patient to still call for assistance

## 2018-01-17 NOTE — PROGRESS NOTES
Awake, A&O, sitting at EOB.  NPO for surgery today.  Right great toe dressing is CDI.  Swelling present to RLE.  Medicated for pain per mar.  IV patent, SL.  Ambulates independently with a steady gait, calls for assist as needed.  POC discussed, pt agrees and verbalizes understanding.  Hourly rounding in place, call light is within reach.

## 2018-01-17 NOTE — PROGRESS NOTES
Seen and examined, assuming care for Dr. Leo.  Failed right foot great toe amputation, with recurrent drainage and imaging signs of osteomyelitis.    Blood pressure 115/72, pulse 78, temperature 36.3 °C (97.4 °F), resp. rate 16, height 1.829 m (6'), weight (!) 132.5 kg (292 lb), SpO2 96 %.      Exam:  Right foot distal wound with malodorous drainage, palpable DP on right, intact but diminished light touch sensation throughout feet    #1 Right foot osteomyelitis    Plan:  - To OR today for 1st ray amputation  - NPO  - Heel WBAT post-op  - IV ABX per ID

## 2018-01-17 NOTE — PROGRESS NOTES
Infectious Disease Progress Note    Author: Cristy Holley M.D. Date & Time of service: 2018  2:52 PM    Chief Complaint:  diabetic foot ulcer at surgical site      Interval History:  52 y.o. diabetic male admitted 1/15/2018 pain/open wound at the site of right great toe amputation   AF WBC 8.5 seen with LPS Tolerating abx well   Af tolerating abx well-plan for OR today  Labs Reviewed, Medications Reviewed, Radiology Reviewed and Wound Reviewed.    Review of Systems:  Review of Systems   Constitutional: Negative for chills and fever.   Gastrointestinal: Negative for abdominal pain, diarrhea, nausea and vomiting.   Musculoskeletal: Positive for joint pain.   All other systems reviewed and are negative.      Hemodynamics:  Temp (24hrs), Av.4 °C (97.5 °F), Min:36.2 °C (97.1 °F), Max:36.6 °C (97.8 °F)  Temperature: 36.2 °C (97.1 °F)  Pulse  Av.2  Min: 65  Max: 96Heart Rate (Monitored): 71  Blood Pressure: 115/72, NIBP: 131/67       Physical Exam:  Physical Exam   Constitutional: He is oriented to person, place, and time. He appears well-developed. No distress.   Obese   HENT:   Head: Normocephalic and atraumatic.   Eyes: EOM are normal. Pupils are equal, round, and reactive to light.   Neck: Neck supple.   Cardiovascular: Normal rate.    Pulmonary/Chest: Effort normal and breath sounds normal. No respiratory distress.   Abdominal: Soft. He exhibits no distension. There is no tenderness.   Musculoskeletal: He exhibits edema and tenderness.   1-2 cm ulceration with eschar at right great toe amp site  Edema and erythema to forefoot  Probes to bone   Neurological: He is alert and oriented to person, place, and time.   Skin: There is erythema.   Nursing note and vitals reviewed.      Meds:    Current Facility-Administered Medications:   •  amlodipine  •  aspirin  •  carvedilol  •  celecoxib  •  famotidine  •  lisinopril  •  pravastatin  •  senna-docusate **AND** polyethylene glycol/lytes **AND**  magnesium hydroxide **AND** bisacodyl  •  Respiratory Care per Protocol  •  acetaminophen  •  Notify provider if pain remains uncontrolled **AND** Use the numeric rating scale (NRS-11) on regular floors and Critical-Care Pain Observation Tool (CPOT) on ICUs/Trauma to assess pain **AND** Pulse Ox (Oximetry) **AND** Pharmacy Consult Request **AND** If patient difficult to arouse and/or has respiratory depression, stop any opiates that are currently infusing and call a Rapid Response. **AND** oxycodone immediate-release **AND** oxycodone immediate-release **AND** morphine injection  •  insulin regular **AND** Accu-Chek ACHS **AND** NOTIFY MD and PharmD **AND** glucose 4 g **AND** dextrose 50%  •  ondansetron  •  ondansetron  •  linezolid  •  cefepime  •  metronidazole    Labs:  Recent Labs      01/15/18   1156  01/17/18   0359   WBC  8.5  6.3   RBC  4.59*  4.53*   HEMOGLOBIN  12.7*  12.8*   HEMATOCRIT  37.8*  37.1*   MCV  82.4  81.9   MCH  27.7  28.3   RDW  42.3  42.3   PLATELETCT  175  173   MPV  11.0  10.9   NEUTSPOLYS  57.30   --    LYMPHOCYTES  30.80   --    MONOCYTES  7.30   --    EOSINOPHILS  3.90   --    BASOPHILS  0.50   --      Recent Labs      01/15/18   1156  01/17/18   0359   SODIUM  135  135   POTASSIUM  3.5*  3.7   CHLORIDE  102  103   CO2  25  24   GLUCOSE  167*  147*   BUN  15  11     Recent Labs      01/15/18   1156  01/17/18   0359   ALBUMIN  3.6   --    TBILIRUBIN  0.5   --    ALKPHOSPHAT  53   --    TOTPROTEIN  6.6   --    ALTSGPT  27   --    ASTSGOT  16   --    CREATININE  0.77  0.74       Imaging:  Dx-foot-complete 3+ Right    Result Date: 1/15/2018  1/15/2018 1:07 PM HISTORY/REASON FOR EXAM:  Atraumatic Pain/Swelling/Deformity Right foot pain s/p Great toe amputation. Pt had a recent toe amputation on the right foot. Home health nurse checked his foot this morning and sts his open wound is full thickness and needed to come in TECHNIQUE/EXAM DESCRIPTION AND NUMBER OF VIEWS: 3 views of the RIGHT  foot. COMPARISON:  11/10/2017 FINDINGS: Prior amputation of great toe at the level of the metatarsophalangeal joint. Focal soft tissue swelling over the distal 1st metatarsal.  Lucency within soft tissues consistent with ulceration.  No soft tissue gas or radiopaque foreign body.  No focal bony destruction. Degenerative change of the midfoot. Calcaneal enthesophytes present. Forefoot and ankle soft tissue swelling.     1.  Prior great toe amputation. 2.  RIGHT forefoot soft tissue swelling with ulceration overlying the distal 1st metatarsal head. 3.  Forefoot and ankle soft tissue swelling. 4.  No gross bone destruction, however osteomyelitis is not excluded by plain film alone.      Micro:  Results     Procedure Component Value Units Date/Time    CULTURE WOUND W/ GRAM STAIN [[id number]]  (Abnormal) Collected:  01/15/18 1216    Order Status:  Completed Specimen:  Wound from Right Foot Updated:  01/16/18 1258     Significant Indicator POS (POS)     Source WND     Site RIGHT FOOT     Culture Result Wound -- (A)     Gram Stain Result --     Moderate WBCs.  Many Gram positive cocci.  Many Gram positive rods.       Culture Result Wound -- (A)     Staphylococcus aureus  Heavy growth       Culture Result Wound -- (A)     Group D Enterococcus species  Heavy growth  Combination therapy with ampicillin, penicillin, or  vancomycin (for susceptible strains) plus an aminoglycoside  is usually indicated for serious enterococcal infections,  such as endocarditis unless high-level resistance to both  gentamicin and streptomycin is documented; such combinations  are predicted to result in synergistic killing of the  Enterococcus.      GRAM STAIN [[id number]] Collected:  01/15/18 1216    Order Status:  Completed Specimen:  Wound Updated:  01/15/18 1821     Significant Indicator .     Source WND     Site RIGHT FOOT     Gram Stain Result --     Moderate WBCs.  Many Gram positive cocci.  Many Gram positive rods.             Assessment:  Active Hospital Problems    Diagnosis   • Lower limb amputation, great toe (CMS-HCC) [Z89.419]   • GERD (gastroesophageal reflux disease) [K21.9]   • HLD (hyperlipidemia) [E78.5]   • Hypertension [I10]   • Type 2 diabetes mellitus (CMS-HCC) [E11.9]   • Obesity [E66.9]       Plan:  Diabetic foot ulcer at prior amputation site, right great toe    Afebrile  No leukocytosis  S/p right great toe amputation with Dr Leo on 11/10/2017  S/p 6 week IV abx  Failure to completely heal   Recent wound culture with resistant entercoccus  Current wound cxs with SA and enterococcus  Continue Zyvox, cefepime, flagyl  MRI+ OM-clinical OM  LPS appreciated  SHAYAN OK in 11/2017  Duration abx per surgical plan and findings -anticipate 6 weeks IV abx post-op  Plan for ray amp today     Type 2 diabetes mellitus    Keep BS under 150 to help control current infection  Hgb A1C in 11/2017 =9.6        Plan of care discussed with LPS

## 2018-01-17 NOTE — PROGRESS NOTES
[facility] Hospitalist Progress Note    Date of Service: 2018    Chief Complaint  52 y.o. male admitted 1/15/2018 with toe pain and drainage from recent amputation.    Interval Problem Update  MRI foot is pending. He is doing well but still with significant tenderness around toe amputation. No acute overnight events.    Consultants/Specialty  ID  Surgery  DM educators    Disposition  Pending medical clearance.        Review of Systems   Constitutional: Positive for malaise/fatigue. Negative for chills and fever.   Respiratory: Negative for shortness of breath.    Cardiovascular: Negative for chest pain, palpitations and leg swelling.   Gastrointestinal: Negative for abdominal pain, nausea and vomiting.   Genitourinary: Negative for dysuria.   Musculoskeletal: Positive for joint pain. Negative for falls.   Skin:        Erythema and drainage from around previous amputation site   Neurological: Positive for weakness. Negative for headaches.   Psychiatric/Behavioral: Negative.    All other systems reviewed and are negative.     Physical Exam  Laboratory/Imaging   Hemodynamics  Temp (24hrs), Av.6 °C (97.8 °F), Min:36.4 °C (97.5 °F), Max:36.7 °C (98 °F)   Temperature: 36.6 °C (97.8 °F)  Pulse  Av.4  Min: 65  Max: 96    Blood Pressure: 101/51      Respiratory      Respiration: 18, Pulse Oximetry: 94 %, O2 Daily Delivery Respiratory : Silicone Nasal Cannula     Work Of Breathing / Effort: Mild  RUL Breath Sounds: Clear, RML Breath Sounds: Clear, RLL Breath Sounds: Diminished, ELIAS Breath Sounds: Clear, LLL Breath Sounds: Diminished    Fluids  No intake or output data in the 24 hours ending 18 2150    Nutrition  Orders Placed This Encounter   Procedures   • Diet Order     Standing Status:   Standing     Number of Occurrences:   1     Order Specific Question:   Diet:     Answer:   Diabetic [3]     Order Specific Question:   Diet:     Answer:   Cardiac [6]     Order Specific Question:   Diet:     Answer:   2  Gram Sodium [7]   • DIET NPO     Standing Status:   Standing     Number of Occurrences:   1     Order Specific Question:   Restrict to:     Answer:   Sips with Medications [3]   • DIET NPO     Standing Status:   Standing     Number of Occurrences:   8     Order Specific Question:   Restrict to:     Answer:   Sips with Medications [3]     Physical Exam   Constitutional: He is oriented to person, place, and time. He appears well-developed. No distress.   HENT:   Mouth/Throat: Oropharynx is clear and moist.   Eyes: EOM are normal. Pupils are equal, round, and reactive to light. No scleral icterus.   Neck: Normal range of motion. Neck supple.   Cardiovascular: Normal rate, regular rhythm and intact distal pulses.    Pulmonary/Chest: Breath sounds normal. No respiratory distress. He has no rales.   Abdominal: Soft. Bowel sounds are normal. He exhibits no distension. There is no tenderness.   Musculoskeletal: He exhibits edema.   Lymphadenopathy:     He has no cervical adenopathy.   Neurological: He is alert and oriented to person, place, and time.   Skin: Skin is warm. There is erythema.   Swelling with eschar and drainage; exquisitely tender to touch   Vitals reviewed.      Recent Labs      01/15/18   1156   WBC  8.5   RBC  4.59*   HEMOGLOBIN  12.7*   HEMATOCRIT  37.8*   MCV  82.4   MCH  27.7   MCHC  33.6*   RDW  42.3   PLATELETCT  175   MPV  11.0     Recent Labs      01/15/18   1156   SODIUM  135   POTASSIUM  3.5*   CHLORIDE  102   CO2  25   GLUCOSE  167*   BUN  15   CREATININE  0.77   CALCIUM  8.8     Recent Labs      01/15/18   1156   APTT  29.8   INR  0.98                  Assessment/Plan     Lower limb amputation, great toe (CMS-HCC)- (present on admission)   Assessment & Plan    R great toe amputation with Dr Leo on 11/10/2017. Subsequent infection / OM  - ID and surgery following, greatly appreciate  - MRI pending for likely amputation  - continue cefepime, zyvox and flagyl  - LPS consult  - pain control         HLD (hyperlipidemia)- (present on admission)   Assessment & Plan    Pravastatin        GERD (gastroesophageal reflux disease)- (present on admission)   Assessment & Plan    Stable.  - Pepcid        Hypertension- (present on admission)   Assessment & Plan    Normotensive.  - Continue carvedilol, lisinopril and amlodipine        Type 2 diabetes mellitus (CMS-HCC)- (present on admission)   Assessment & Plan    Uncontrolled with hyperglycemia. A1C 9.2%  - Holding oral hypoglycemic agents  - SSI and hypoglycemia protocol  - goal FS> 200 for better wound healing        Obesity- (present on admission)   Assessment & Plan    Body mass index is 39.6 kg/m².            Reviewed items::  Medications reviewed, Labs reviewed and Radiology images reviewed  Zabala catheter::  No Zabala  DVT prophylaxis pharmacological::  Heparin  Ulcer Prophylaxis::  Yes

## 2018-01-17 NOTE — OP REPORT
DATE OF SERVICE:  01/17/2018    PREOPERATIVE DIAGNOSIS:  Right failed great toe amputation with metatarsal   head osteomyelitis.    POSTOPERATIVE DIAGNOSIS:  Right failed great toe amputation with metatarsal   head osteomyelitis.    PROCEDURE:  Right foot first ray amputation.    SURGEON:  Doug Delong MD    ASSISTANT:  Eduar Austin MD    ANESTHESIOLOGIST:  Petros Hardy MD.    ANESTHESIA TYPE:  Regional and general.    SPECIMENS:  None.    ESTIMATED BLOOD LOSS:  Minimal.    COMPLICATIONS:  None.    OPERATIVE INDICATIONS:  The patient is a 52-year-old male who underwent a   prior right great toe amputation with Dr. Horvath for a diabetic infection in   November 2017.  This subsequently healed partially but not completely again   continue to drain.  He has had a recent increase in drainage with purulent and   malodorous discharge.  He presented to the hospital, had an MRI was   concerning for osteomyelitis of the first metatarsal head.  Given his open   wound, which probes to bone and then imaging changes, he is an appropriately   indicated candidate for first ray amputation.  Discussed the risks, benefits,   and alternatives with the patient including the risk of infection, wound   healing complication and need for additional procedures including more   proximal amputation, neurovascular injury, blood loss and the medical risk of   anesthesia.  I discussed the benefits including improved chance of healing and   alternatives including medical management, which I did not recommend.  He is   in agreement with plan to proceed.  Informed consent was signed and documented   in the medical record.  I met with him preoperatively and marked the   operative extremity with his agreement to proceed to the operating room at   [facility].    OPERATIVE COURSE:  He underwent general and regional anesthesia by Dr. Hardy.    Regional anesthesia was performed for postoperative pain control at my   request.  When that was complete, he  was positioned supine.  Right lower   extremity was prepped and draped in sterile orthopedic fashion with gentle   iodine prep of the wound and the surgical team scrubbed in.  Procedural pause   was conducted to verify the correct patient, correct extremity, presence of   the surgeon's initials on the operative extremity and administration of IV   antibiotics in this case Ancef.  Following generalized agreement, the old   incision was ellipsed and extended proximally along the metatarsal shaft.  We   then dissected circumferentially around the bone and a transverse osteotomy   was performed.  The specimen was resected then for culture.  The first   metatarsal sent for specimen only.  The wound was then thoroughly irrigated.    The tourniquet was deflated minimal with any bleeding was noted.  Hemostasis   was obtained with cautery and the wound was thoroughly irrigated with normal   saline and closed in layers with 2-0 Vicryl, subcutaneous tissue and nylon in   the skin.  Sterile dressings were applied.  Patient was transferred to the   recovery room in stable condition suffering no complications.    POSTOPERATIVE PLAN:  1.  Weightbearing as tolerated on right heel, postop shoe.  2.  Deep venous thrombosis prophylaxis with SCDs.  Chemical prophylaxis per   Meenu.  3.  IV Antibiotics per ID recommendation.  4.  Discharge planning.       ____________________________________     MD LORNA Vázquez / NOEMY    DD:  01/17/2018 14:51:51  DT:  01/17/2018 15:34:31    D#:  [id number]  Job#:  [id number]

## 2018-01-17 NOTE — PROGRESS NOTES
Total Hip Progress Note     Subjective:      No events.  MRI completed.  No fevers or chills.    Objective:    Blood pressure 118/53, pulse 71, temperature 36.4 °C (97.5 °F), resp. rate 18, height 1.829 m (6'), weight (!) 132.5 kg (292 lb), SpO2 95 %.    Recent Labs      01/15/18   1156  01/17/18   0359   WBC  8.5  6.3   RBC  4.59*  4.53*   HEMOGLOBIN  12.7*  12.8*   HEMATOCRIT  37.8*  37.1*   MCV  82.4  81.9   MCH  27.7  28.3   MCHC  33.6*  34.5   RDW  42.3  42.3   PLATELETCT  175  173   MPV  11.0  10.9     Recent Labs      01/15/18   1156  01/17/18   0359   SODIUM  135  135   POTASSIUM  3.5*  3.7   CHLORIDE  102  103   CO2  25  24   GLUCOSE  167*  147*   BUN  15  11   CREATININE  0.77  0.74   CALCIUM  8.8  8.8         Intake/Output Summary (Last 24 hours) at 01/17/18 0733  Last data filed at 01/17/18 0200   Gross per 24 hour   Intake              130 ml   Output                0 ml   Net              130 ml       Palpable DP pulse  Purulence and foul odor at prior amputation site      Impression:  R foot infection  Prior R great toe amputation  DMII  HTN     -OR today with Dr. Delong  Keep NPO and hold DVT ppx

## 2018-01-17 NOTE — OR NURSING
Pt A&OX4. VSS. Pt on room air. R foot elevated on pillows with a 2+ pedal pulse, pt states pain 2/10 post pain medication administration and sciatic nerve block placed in OR. Pt can wiggle toes and left R foot off bed, states some numbness post block. Pt denies nausea. R foot fitted with ortho shoe, currently off and on bed. Report called to ELISA Jurado. Pt out of pacu via bed with transport.

## 2018-01-17 NOTE — CARE PLAN
Problem: Knowledge Deficit  Goal: Knowledge of disease process/condition, treatment plan, diagnostic tests, and medications will improve    Intervention: Assess knowledge level of disease process/condition, treatment plan, diagnostic tests, and medications  Discussed at length specifics related to patient condition , answered all questions posed by patient and addressed areas of concern. Provided reassurance. Patient verbally expressed understanding of subjects discussed.

## 2018-01-17 NOTE — CONSULTS
Diabetes education: Met with Sebastián, known from previous admit. Discussed goals for blood sugars (80 -150). Pt takes his blood sugars twice a day and had been below 130 until yesterday. Reviewed what effects blood sugars and questions answered.  Plan: Please call [phone] if needs change.    Previous visits:    Consults  Date of Service: 11/13/2017 6:20 PM  Airam Jerez R.N.             Diabetes education: Met with Sebastián, who is newly dx with diabetes.  Discussed what diabetes was, type two diabetes, goals for blood sugars, especially with healing wound, finger sticks, hyperglycemia, hypoglycemia, foot care and what effects blood sugars.  Discussed insulin and asked that he give his own insulin when needed in case he may need to go home on insulin.   Pt is currently on sliding scale coverage ac and hs with regular insulin. Blood sugars have been:11 /11: 210 (4 units), 154 (3 units), and 150, 11/12: 141, 159 (3 units), 144, and 124. 11/13:121, 151 (3 units and 132.  Discussed less costly oral agents and insulin as well as meters and strips.  CDE will give a meter tomorrow. If patient going home on oral agents he needs only to test once a day. Otherwise more frequently. Hg a1c was 9.2%.  Handouts given for review.  Plan: CDE will follow up tomorrow to complete education including meter and handout on resources. Please call [phone] if needs change.        Consults  Date of Service: 11/13/2017 6:20 PM  Airam Jerez R.N.          Diabetes education: Met with Sebastián, who is newly dx with diabetes.  Discussed what diabetes was, type two diabetes, goals for blood sugars, especially with healing wound, finger sticks, hyperglycemia, hypoglycemia, foot care and what effects blood sugars.  Discussed insulin and asked that he give his own insulin when needed in case he may need to go home on insulin.   Pt is currently on sliding scale coverage ac and hs with regular insulin. Blood sugars have been:11 /11: 210 (4 units), 154 (3  units), and 150, 11/12: 141, 159 (3 units), 144, and 124. 11/13:121, 151 (3 units and 132.  Discussed less costly oral agents and insulin as well as meters and strips.  CDE will give a meter tomorrow. If patient going home on oral agents he needs only to test once a day. Otherwise more frequently. Hg a1c was 9.2%.  Handouts given for review.  Plan: CDE will follow up tomorrow to complete education including meter and handout on resources. Please call [phone] if needs change.

## 2018-01-17 NOTE — PROGRESS NOTES
Diabetes education: Met with patient this afternoon. Please see consult note.  Pt is currently on Regular insulin sliding scale coverage ac and hs. Current blood sugars are:  144,  183 (2 units), 153 (2 units) and 136. Last Hg A1c was from 11/11/17 and was 9.3%.  CDE will continue to follow. Please call [phone] if needs change.

## 2018-01-17 NOTE — CARE PLAN
Problem: Nutritional:  Goal: Patient to verbalize or demonstrate understanding of diet  Outcome: MET Date Met: 01/16/18  RD attempted DM diet education. Pt declined verbal and written information, pt reports he has had education in past and understands it well and has a person who cooks for him who understands DM diet well. Reviewed last A1c of 9.2 on 11/11/17 with pt who verbalized understanding. RD available PRN.

## 2018-01-18 LAB
ERYTHROCYTE [DISTWIDTH] IN BLOOD BY AUTOMATED COUNT: 42.4 FL (ref 35.9–50)
GLUCOSE BLD-MCNC: 136 MG/DL (ref 65–99)
GLUCOSE BLD-MCNC: 145 MG/DL (ref 65–99)
GLUCOSE BLD-MCNC: 176 MG/DL (ref 65–99)
GLUCOSE BLD-MCNC: 193 MG/DL (ref 65–99)
HCT VFR BLD AUTO: 36.5 % (ref 42–52)
HGB BLD-MCNC: 12.2 G/DL (ref 14–18)
MCH RBC QN AUTO: 27.5 PG (ref 27–33)
MCHC RBC AUTO-ENTMCNC: 33.4 G/DL (ref 33.7–35.3)
MCV RBC AUTO: 82.2 FL (ref 81.4–97.8)
PLATELET # BLD AUTO: 173 K/UL (ref 164–446)
PMV BLD AUTO: 9.8 FL (ref 9–12.9)
RBC # BLD AUTO: 4.44 M/UL (ref 4.7–6.1)
RHODAMINE-AURAMINE STN SPEC: NORMAL
SIGNIFICANT IND 70042: NORMAL
SITE SITE: NORMAL
SOURCE SOURCE: NORMAL
WBC # BLD AUTO: 6.9 K/UL (ref 4.8–10.8)

## 2018-01-18 PROCEDURE — G8978 MOBILITY CURRENT STATUS: HCPCS | Mod: CI

## 2018-01-18 PROCEDURE — G8980 MOBILITY D/C STATUS: HCPCS | Mod: CI

## 2018-01-18 PROCEDURE — 99232 SBSQ HOSP IP/OBS MODERATE 35: CPT | Performed by: HOSPITALIST

## 2018-01-18 PROCEDURE — G8988 SELF CARE GOAL STATUS: HCPCS | Mod: CI

## 2018-01-18 PROCEDURE — 85027 COMPLETE CBC AUTOMATED: CPT

## 2018-01-18 PROCEDURE — 97165 OT EVAL LOW COMPLEX 30 MIN: CPT

## 2018-01-18 PROCEDURE — A9270 NON-COVERED ITEM OR SERVICE: HCPCS | Performed by: INTERNAL MEDICINE

## 2018-01-18 PROCEDURE — G8989 SELF CARE D/C STATUS: HCPCS | Mod: CI

## 2018-01-18 PROCEDURE — 770021 HCHG ROOM/CARE - ISO PRIVATE

## 2018-01-18 PROCEDURE — 97161 PT EVAL LOW COMPLEX 20 MIN: CPT

## 2018-01-18 PROCEDURE — 700111 HCHG RX REV CODE 636 W/ 250 OVERRIDE (IP): Performed by: INTERNAL MEDICINE

## 2018-01-18 PROCEDURE — 700102 HCHG RX REV CODE 250 W/ 637 OVERRIDE(OP): Performed by: INTERNAL MEDICINE

## 2018-01-18 PROCEDURE — G8987 SELF CARE CURRENT STATUS: HCPCS | Mod: CI

## 2018-01-18 PROCEDURE — 36415 COLL VENOUS BLD VENIPUNCTURE: CPT

## 2018-01-18 PROCEDURE — 82962 GLUCOSE BLOOD TEST: CPT

## 2018-01-18 PROCEDURE — G8979 MOBILITY GOAL STATUS: HCPCS | Mod: CI

## 2018-01-18 RX ADMIN — OXYCODONE HYDROCHLORIDE 5 MG: 5 TABLET ORAL at 20:31

## 2018-01-18 RX ADMIN — AMLODIPINE BESYLATE 10 MG: 10 TABLET ORAL at 09:03

## 2018-01-18 RX ADMIN — OXYCODONE HYDROCHLORIDE 5 MG: 5 TABLET ORAL at 09:07

## 2018-01-18 RX ADMIN — CARVEDILOL 3.12 MG: 3.12 TABLET, FILM COATED ORAL at 16:54

## 2018-01-18 RX ADMIN — CARVEDILOL 3.12 MG: 3.12 TABLET, FILM COATED ORAL at 09:03

## 2018-01-18 RX ADMIN — ONDANSETRON 4 MG: 2 INJECTION INTRAMUSCULAR; INTRAVENOUS at 01:20

## 2018-01-18 RX ADMIN — ASPIRIN 81 MG: 81 TABLET, COATED ORAL at 09:03

## 2018-01-18 RX ADMIN — OXYCODONE HYDROCHLORIDE 5 MG: 5 TABLET ORAL at 14:07

## 2018-01-18 RX ADMIN — INSULIN HUMAN 2 UNITS: 100 INJECTION, SOLUTION PARENTERAL at 11:14

## 2018-01-18 RX ADMIN — INSULIN HUMAN 2 UNITS: 100 INJECTION, SOLUTION PARENTERAL at 16:57

## 2018-01-18 RX ADMIN — FAMOTIDINE 20 MG: 20 TABLET, FILM COATED ORAL at 22:26

## 2018-01-18 RX ADMIN — METRONIDAZOLE 500 MG: 500 TABLET ORAL at 06:07

## 2018-01-18 RX ADMIN — CELECOXIB 200 MG: 200 CAPSULE ORAL at 09:03

## 2018-01-18 RX ADMIN — PRAVASTATIN SODIUM 20 MG: 10 TABLET ORAL at 22:26

## 2018-01-18 RX ADMIN — LINEZOLID 600 MG: 600 TABLET, FILM COATED ORAL at 09:03

## 2018-01-18 RX ADMIN — OXYCODONE HYDROCHLORIDE 5 MG: 5 TABLET ORAL at 03:57

## 2018-01-18 RX ADMIN — CELECOXIB 200 MG: 200 CAPSULE ORAL at 22:26

## 2018-01-18 RX ADMIN — LISINOPRIL 40 MG: 20 TABLET ORAL at 09:03

## 2018-01-18 RX ADMIN — LINEZOLID 600 MG: 600 TABLET, FILM COATED ORAL at 22:26

## 2018-01-18 ASSESSMENT — PAIN SCALES - GENERAL
PAINLEVEL_OUTOF10: 5
PAINLEVEL_OUTOF10: 2
PAINLEVEL_OUTOF10: 4
PAINLEVEL_OUTOF10: 2
PAINLEVEL_OUTOF10: 2
PAINLEVEL_OUTOF10: 7
PAINLEVEL_OUTOF10: 2

## 2018-01-18 ASSESSMENT — COGNITIVE AND FUNCTIONAL STATUS - GENERAL
SUGGESTED CMS G CODE MODIFIER DAILY ACTIVITY: CI
MOBILITY SCORE: 18
MOVING TO AND FROM BED TO CHAIR: A LITTLE
WALKING IN HOSPITAL ROOM: A LITTLE
STANDING UP FROM CHAIR USING ARMS: A LITTLE
HELP NEEDED FOR BATHING: A LITTLE
DAILY ACTIVITIY SCORE: 23
SUGGESTED CMS G CODE MODIFIER MOBILITY: CK
CLIMB 3 TO 5 STEPS WITH RAILING: A LITTLE
TURNING FROM BACK TO SIDE WHILE IN FLAT BAD: A LITTLE
MOVING FROM LYING ON BACK TO SITTING ON SIDE OF FLAT BED: A LITTLE

## 2018-01-18 ASSESSMENT — ACTIVITIES OF DAILY LIVING (ADL): TOILETING: INDEPENDENT

## 2018-01-18 ASSESSMENT — ENCOUNTER SYMPTOMS
ABDOMINAL PAIN: 0
HEADACHES: 0
DIARRHEA: 0
PALPITATIONS: 0
FEVER: 0
NAUSEA: 0
NAUSEA: 1
FALLS: 0
SHORTNESS OF BREATH: 0
PSYCHIATRIC NEGATIVE: 1
WEAKNESS: 0
CHILLS: 0
VOMITING: 0

## 2018-01-18 ASSESSMENT — GAIT ASSESSMENTS
GAIT LEVEL OF ASSIST: SUPERVISED
ASSISTIVE DEVICE: FRONT WHEEL WALKER
DEVIATION: BRADYKINETIC
DISTANCE (FEET): 150

## 2018-01-18 NOTE — CARE PLAN
Problem: Nutritional:  Goal: Achieve adequate nutritional intake  Patient will consume >75% of meals  Outcome: PROGRESSING AS EXPECTED

## 2018-01-18 NOTE — DIETARY
Nutrition Services: Pt seen for poor PO/wt loss per admit screen. Diet= Regular, previously NPO for right toe amputation today. Pt reports poor intake/ wt loss over past 6-8 months due to working long days and poor nutrition choices, UBW is ~ 400#. Current wt is 132.5 kg/ 292 lbs (per bed scale on 1/15) and BMI of 39.6, pt with 27% wt loss. Decreased intake and severe wt loss over past 6-8 months are characteristics of severe malnutrition. Discussed  Importance of adequate nutrition and tight glucose control for wound healing with tips for optimizing nutrition. Pt declined snacks/supplements and wants to see how meals go now that he is done with surgery. Pt states he was eating ~ 50% of meals yesterday. Labs/Meds reviewed.       Plan/Rec:  Nutrition Representative to see pt daily for snacks/meal preferences as appropriate with diet order. Change diet to Diabetic (discussed with RN). RD to monitor for adequate intake.

## 2018-01-18 NOTE — THERAPY
"Occupational Therapy Evaluation completed.   Functional Status: Supv supine > < EOB, supv LB dressing, supv/SBA transfers with FWW  Plan of Care: Patient with no further skilled OT needs in the acute care setting at this time  Discharge Recommendations:  Equipment: No Equipment Needed. Post-acute therapy Discharge to home with outpatient or home health for additional skilled therapy services.    See \"Rehab Therapy-Acute\" Patient Summary Report for complete documentation.    52 y.o. male with h/o HTN, DM who underwent R great toe amputation on 11/10/17. Pt went to SNF for ABX, therapies, then home with HH. Readmitted for toe infection. Pt underwent revision of amputation. Seen now for OT eval. Pt completed bed mobility, amb to/from bathroom, toilet transfer. Pt donned L off-loading shoe to decrease leg length discrepancy from R boot. Trained pt on doffing and donning boot. Good demo. Pt has shower chair at home. Able to describe process for transfer and seated bathing. (Recommend supv with transfer which pt agrees to.) Pt has grab bar by toilet at home, but relied heavily on grab bar here, required multiple attempts at sit to stand. Recommend pt obtain RTS. Pt agreeable. Pt is completing basic ADL and transfers with no more than SBA. Has support from housemates at home. Pt has no further acute OT needs at this time.     "

## 2018-01-18 NOTE — PROGRESS NOTES
Late entry:  Patient returned to room from PACU.  Awake, somewhat drowsy.  Right foot with gauze dressing and compression wrap, CDI, elevated on pillows.  Rates pain level as a 2/10.  Having some nausea, medicated per mar.  ADA diet order in place.  POC discussed.  Call light within reach, hourly rounding in place.

## 2018-01-18 NOTE — PROGRESS NOTES
[facility] Hospitalist Progress Note    Date of Service: 2018    Chief Complaint  52 y.o. male admitted 1/15/2018 with toe pain and drainage from recent amputation.    Interval Problem Update  He is doing well but still with significant tenderness around toe amputation. Now able to stand though. Waiting for surgery this afternoon. No acute overnight events.    Consultants/Specialty  ID  Surgery  DM educators    Disposition  Pending medical clearance.        Review of Systems   Constitutional: Positive for malaise/fatigue. Negative for chills and fever.   Respiratory: Negative for shortness of breath.    Cardiovascular: Negative for chest pain, palpitations and leg swelling.   Gastrointestinal: Negative for abdominal pain, nausea and vomiting.   Genitourinary: Negative for dysuria.   Musculoskeletal: Positive for joint pain. Negative for falls.   Skin:        Erythema and drainage from around previous amputation site   Neurological: Negative for weakness and headaches.   Psychiatric/Behavioral: Negative.    All other systems reviewed and are negative.     Physical Exam  Laboratory/Imaging   Hemodynamics  Temp (24hrs), Av.5 °C (97.7 °F), Min:36.2 °C (97.1 °F), Max:36.8 °C (98.2 °F)   Temperature: 36.8 °C (98.2 °F)  Pulse  Av.9  Min: 65  Max: 96 Heart Rate (Monitored): 67  Blood Pressure: 129/60, NIBP: 130/66      Respiratory      Respiration: 16, Pulse Oximetry: 90 %     Work Of Breathing / Effort: Mild  RUL Breath Sounds: Clear, RML Breath Sounds: Clear, RLL Breath Sounds: Diminished, ELIAS Breath Sounds: Clear, LLL Breath Sounds: Diminished    Fluids    Intake/Output Summary (Last 24 hours) at 18  Last data filed at 18 1528   Gross per 24 hour   Intake              930 ml   Output               10 ml   Net              920 ml       Nutrition  Orders Placed This Encounter   Procedures   • DIET ORDER     Standing Status:   Standing     Number of Occurrences:   1     Order Specific Question:    Diet:     Answer:   Diabetic [3]     Physical Exam   Constitutional: He is oriented to person, place, and time. He appears well-developed. No distress.   HENT:   Mouth/Throat: Oropharynx is clear and moist.   Eyes: EOM are normal. Pupils are equal, round, and reactive to light. No scleral icterus.   Neck: Normal range of motion. Neck supple.   Cardiovascular: Normal rate and regular rhythm.    Pulmonary/Chest: Effort normal. No respiratory distress.   Abdominal: Soft. He exhibits no distension. There is no tenderness.   Musculoskeletal: He exhibits edema.   Lymphadenopathy:     He has no cervical adenopathy.   Neurological: He is alert and oriented to person, place, and time.   Skin: Skin is warm. There is erythema.   Swelling with eschar and drainage; exquisitely tender to touch   Vitals reviewed.      Recent Labs      01/15/18   1156  01/17/18   0359   WBC  8.5  6.3   RBC  4.59*  4.53*   HEMOGLOBIN  12.7*  12.8*   HEMATOCRIT  37.8*  37.1*   MCV  82.4  81.9   MCH  27.7  28.3   MCHC  33.6*  34.5   RDW  42.3  42.3   PLATELETCT  175  173   MPV  11.0  10.9     Recent Labs      01/15/18   1156  01/17/18   0359   SODIUM  135  135   POTASSIUM  3.5*  3.7   CHLORIDE  102  103   CO2  25  24   GLUCOSE  167*  147*   BUN  15  11   CREATININE  0.77  0.74   CALCIUM  8.8  8.8     Recent Labs      01/15/18   1156   APTT  29.8   INR  0.98                  Assessment/Plan     Lower limb amputation, great toe (CMS-HCC)- (present on admission)   Assessment & Plan    R great toe amputation with Dr Leo on 11/10/2017 s/p 6 weeks abx. Subsequent infection / OM  - ID and surgery following, greatly appreciate  - continue cefepime, zyvox and flagyl  - LPS following, greatly appreciate  - pain control  - surgery with ortho today    MRI foot:  First metatarsal head edema and enhancement is compatible with osteomyelitis, seen at the deep margin of deep skin ulceration at the amputation stump  Great toe amputation  Cellulitis, granulation  tissue, phlegmonous change. No abscess  Medial first metatarsal head sesamoid edema and enhancement could be reactive or from osteomyelitis        HLD (hyperlipidemia)- (present on admission)   Assessment & Plan    Pravastatin        GERD (gastroesophageal reflux disease)- (present on admission)   Assessment & Plan    Stable.  - Pepcid        Hypertension- (present on admission)   Assessment & Plan    Normotensive.  - Continue carvedilol, lisinopril and amlodipine        Type 2 diabetes mellitus (CMS-[facility])- (present on admission)   Assessment & Plan    Uncontrolled with hyperglycemia. A1C 9.2%. Now with FS > 200 over the last 24 hours.  - Holding oral hypoglycemic agents  - SSI and hypoglycemia protocol  - goal FS> 200 for better wound healing        Obesity- (present on admission)   Assessment & Plan    Body mass index is 39.6 kg/m².            Reviewed items::  Medications reviewed, Labs reviewed and Radiology images reviewed  Zabala catheter::  No Zabala  DVT prophylaxis pharmacological::  Heparin  Ulcer Prophylaxis::  Yes

## 2018-01-18 NOTE — THERAPY
"Physical Therapy Evaluation completed.   Bed Mobility:  Supine to Sit: Supervised  Transfers: Sit to Stand: Supervised  Gait: Level Of Assist: Supervised with Front-Wheel Walker       Plan of Care: Patient with no further skilled PT needs in the acute care setting at this time  Discharge Recommendations: Equipment: No Equipment Needed. Post-acute therapy Discharge to home with outpatient or home health for additional skilled therapy services.    Mr. Castro is a 53 y/o male who presents to acute secondary to R failed great toe amputation with metatarsal head otseomyelitis. He is s/p R 1st ray amputation and is heel weight bearing. He presents with lower extremity strength that is equal bilaterally and WFL. Sensation deficits that are baseline. No LOB when standing and ambulating. He was able to perform gait, transfers, and bed mobility without physical assist. He appears at his baseline level of function. No additional acute physical therapy needs at this time.     See \"Rehab Therapy-Acute\" Patient Summary Report for complete documentation.     "

## 2018-01-18 NOTE — PROGRESS NOTES
Infectious Disease Progress Note    Author: Cristy Holley M.D. Date & Time of service: 2018  10:34 AM    Chief Complaint:  diabetic foot ulcer at surgical site      Interval History:  52 y.o. diabetic male admitted 1/15/2018 pain/open wound at the site of right great toe amputation   AF WBC 8.5 seen with LPS Tolerating abx well   Af tolerating abx well-plan for OR today   AF tolerated surgery well-had a lot of nausea with morphine  Labs Reviewed, Medications Reviewed, Radiology Reviewed and Wound Reviewed.    Review of Systems:  Review of Systems   Constitutional: Negative for chills and fever.   Gastrointestinal: Positive for nausea. Negative for abdominal pain, diarrhea and vomiting.   Musculoskeletal: Positive for joint pain.   All other systems reviewed and are negative.      Hemodynamics:  Temp (24hrs), Av.5 °C (97.7 °F), Min:36.2 °C (97.1 °F), Max:36.8 °C (98.2 °F)  Temperature: 36.3 °C (97.3 °F)  Pulse  Av  Min: 65  Max: 96Heart Rate (Monitored): 67  Blood Pressure: 138/77, NIBP: 130/66       Physical Exam:  Physical Exam   Constitutional: He is oriented to person, place, and time. He appears well-developed. No distress.   Obese   HENT:   Head: Normocephalic and atraumatic.   Eyes: EOM are normal. Pupils are equal, round, and reactive to light.   Neck: Neck supple.   Cardiovascular: Normal rate.    Pulmonary/Chest: Effort normal and breath sounds normal. No respiratory distress.   Abdominal: Soft. He exhibits no distension. There is no tenderness.   Musculoskeletal: He exhibits edema and tenderness.   Foot dressed   Neurological: He is alert and oriented to person, place, and time.   Skin: There is erythema.   Nursing note and vitals reviewed.      Meds:    Current Facility-Administered Medications:   •  amlodipine  •  aspirin  •  carvedilol  •  celecoxib  •  famotidine  •  lisinopril  •  pravastatin  •  senna-docusate **AND** polyethylene glycol/lytes **AND** magnesium  hydroxide **AND** bisacodyl  •  Respiratory Care per Protocol  •  acetaminophen  •  Notify provider if pain remains uncontrolled **AND** Use the numeric rating scale (NRS-11) on regular floors and Critical-Care Pain Observation Tool (CPOT) on ICUs/Trauma to assess pain **AND** Pulse Ox (Oximetry) **AND** Pharmacy Consult Request **AND** If patient difficult to arouse and/or has respiratory depression, stop any opiates that are currently infusing and call a Rapid Response. **AND** oxycodone immediate-release **AND** oxycodone immediate-release **AND** morphine injection  •  insulin regular **AND** Accu-Chek ACHS **AND** NOTIFY MD and PharmD **AND** glucose 4 g **AND** dextrose 50%  •  ondansetron  •  ondansetron  •  linezolid    Labs:  Recent Labs      01/15/18   1156  01/17/18   0359  01/18/18   0338   WBC  8.5  6.3  6.9   RBC  4.59*  4.53*  4.44*   HEMOGLOBIN  12.7*  12.8*  12.2*   HEMATOCRIT  37.8*  37.1*  36.5*   MCV  82.4  81.9  82.2   MCH  27.7  28.3  27.5   RDW  42.3  42.3  42.4   PLATELETCT  175  173  173   MPV  11.0  10.9  9.8   NEUTSPOLYS  57.30   --    --    LYMPHOCYTES  30.80   --    --    MONOCYTES  7.30   --    --    EOSINOPHILS  3.90   --    --    BASOPHILS  0.50   --    --      Recent Labs      01/15/18   1156  01/17/18   0359   SODIUM  135  135   POTASSIUM  3.5*  3.7   CHLORIDE  102  103   CO2  25  24   GLUCOSE  167*  147*   BUN  15  11     Recent Labs      01/15/18   1156  01/17/18   0359   ALBUMIN  3.6   --    TBILIRUBIN  0.5   --    ALKPHOSPHAT  53   --    TOTPROTEIN  6.6   --    ALTSGPT  27   --    ASTSGOT  16   --    CREATININE  0.77  0.74       Imaging:  Dx-foot-complete 3+ Right    Result Date: 1/15/2018  1/15/2018 1:07 PM HISTORY/REASON FOR EXAM:  Atraumatic Pain/Swelling/Deformity Right foot pain s/p Great toe amputation. Pt had a recent toe amputation on the right foot. Home health nurse checked his foot this morning and sts his open wound is full thickness and needed to come in  TECHNIQUE/EXAM DESCRIPTION AND NUMBER OF VIEWS: 3 views of the RIGHT foot. COMPARISON:  11/10/2017 FINDINGS: Prior amputation of great toe at the level of the metatarsophalangeal joint. Focal soft tissue swelling over the distal 1st metatarsal.  Lucency within soft tissues consistent with ulceration.  No soft tissue gas or radiopaque foreign body.  No focal bony destruction. Degenerative change of the midfoot. Calcaneal enthesophytes present. Forefoot and ankle soft tissue swelling.     1.  Prior great toe amputation. 2.  RIGHT forefoot soft tissue swelling with ulceration overlying the distal 1st metatarsal head. 3.  Forefoot and ankle soft tissue swelling. 4.  No gross bone destruction, however osteomyelitis is not excluded by plain film alone.      Micro:  Results     Procedure Component Value Units Date/Time    AFB CULTURE [[id number]] Collected:  01/17/18 1355    Order Status:  Completed Specimen:  Bone Updated:  01/18/18 1014     Significant Indicator NEG     Source BONE     Site Right Foot Sesamoid     AFB Culture Culture in progress.     AFB Smear Results --    GRAM STAIN [[id number]] Collected:  01/17/18 1355    Order Status:  Completed Specimen:  Bone Updated:  01/17/18 1829     Significant Indicator .     Source BONE     Site Right Foot Sesamoid     Gram Stain Result No organisms seen.    CULTURE WOUND W/ GRAM STAIN [[id number]]  (Abnormal)  (Susceptibility) Collected:  01/15/18 1216    Order Status:  Completed Specimen:  Wound from Right Foot Updated:  01/17/18 1609     Gram Stain Result --     Moderate WBCs.  Many Gram positive cocci.  Many Gram positive rods.       Significant Indicator POS (POS)     Source WND     Site RIGHT FOOT     Culture Result Wound -- (A)     Culture Result Wound -- (A)     Methicillin Resistant Staphylococcus aureus  Heavy growth  This isolate is presumed to be clindamycin resistant based on  detection of inducible resistance.  Clindamycin may still  be effective in some  patients.       Culture Result Wound -- (A)     Enterococcus faecalis  Heavy growth  Combination therapy with ampicillin, penicillin, or  vancomycin (for susceptible strains) plus an aminoglycoside  is usually indicated for serious enterococcal infections,  such as endocarditis unless high-level resistance to both  gentamicin and streptomycin is documented; such combinations  are predicted to result in synergistic killing of the  Enterococcus.  The susceptibility profile for this organism indicates that  Streptomycin would not be an effective component of  combination therapy.  The susceptibility profile for this organism indicates that  Gentamycin would not be an effective component of combination  therapy.      Narrative:       CALL  [place]  131 tel. [phone],  CALLED  131 tel. [phone] 01/17/2018, 16:09, RB PERF. RESULTS CALLED  TO:[id number],RN;fax to inf ctl    Culture & Susceptibility     ENTEROCOCCUS FAECALIS     Antibiotic Sensitivity Microscan Unit Status    Ampicillin Sensitive <=2 mcg/mL Final    Daptomycin Sensitive 2 mcg/mL Final    Gent Synergy Resistant >500 mcg/mL Final    Penicillin Sensitive 8 mcg/mL Final    Strep Synergy Resistant >1000 mcg/mL Final    Vancomycin Sensitive 2 mcg/mL Final              METHICILLIN RESISTANT STAPHYLOCOCCUS AUREUS     Antibiotic Sensitivity Microscan Unit Status    Ampicillin/sulbactam Resistant 16/8 mcg/mL Final    Clindamycin Resistant <=0.5 mcg/mL Final    Daptomycin Sensitive <=0.5 mcg/mL Final    Erythromycin Resistant >4 mcg/mL Final    Moxifloxacin Resistant >4 mcg/mL Final    Oxacillin Resistant >2 mcg/mL Final    Penicillin Resistant >8 mcg/mL Final    Tetracycline Sensitive <=4 mcg/mL Final    Trimeth/Sulfa Sensitive <=0.5/9.5 mcg/mL Final    Vancomycin Sensitive 2 mcg/mL Final                       CULTURE TISSUE W/ GRM STAIN [[id number]] Collected:  01/17/18 1358    Order Status:  Completed Specimen:  Other Updated:  01/17/18 1456    ANAEROBIC CULTURE  [[id number]] Collected:  01/17/18 1355    Order Status:  Completed Specimen:  Other Updated:  01/17/18 1459    FUNGAL CULTURE [[id number]] Collected:  01/17/18 1355    Order Status:  Completed Specimen:  Other Updated:  01/17/18 1459    GRAM STAIN [[id number]] Collected:  01/15/18 1216    Order Status:  Completed Specimen:  Wound Updated:  01/15/18 1821     Significant Indicator .     Source WND     Site RIGHT FOOT     Gram Stain Result --     Moderate WBCs.  Many Gram positive cocci.  Many Gram positive rods.            Assessment:  Active Hospital Problems    Diagnosis   • Lower limb amputation, great toe (CMS-HCC) [Z89.419]   • GERD (gastroesophageal reflux disease) [K21.9]   • HLD (hyperlipidemia) [E78.5]   • Hypertension [I10]   • Type 2 diabetes mellitus (CMS-HCC) [E11.9]   • Obesity [E66.9]       Plan:  Diabetic foot ulcer at prior amputation site, right great toe    Afebrile  No leukocytosis  S/p right great toe amputation with Dr Leo on 11/10/2017  S/p 6 week IV abx  Failure to completely heal-Recent wound culture with resistant entercoccus  Current wound cxs with MRSA and enterococcus  MRI+ OM-clinical OM  SHAYAN OK in 11/2017  s/p ray amp 1/17  Continue Zyvox  DC cefepime, flagyl  Anticipate 6 weeks abx post-op-continue Zyvox while in hosp  Dapto as outpatient  PICC ordered     Type 2 diabetes mellitus    Keep BS under 150 to help control current infection  Hgb A1C in 11/2017 =9.6

## 2018-01-18 NOTE — PROGRESS NOTES
Comfortable, no new complaints.    RLE:  Dressing c/d/i  Foot w/w/p    POD#1 s/p R 1st ray amputation    - WBAT through right heel in post-op shoe or CAM boot  - IV antibiotics per ID recommendations  - Wound check POD#2

## 2018-01-19 ENCOUNTER — APPOINTMENT (OUTPATIENT)
Dept: RADIOLOGY | Facility: MEDICAL CENTER | Age: 53
DRG: 475 | End: 2018-01-19
Attending: HOSPITALIST
Payer: MEDICAID

## 2018-01-19 ENCOUNTER — APPOINTMENT (OUTPATIENT)
Dept: WOUND CARE | Facility: MEDICAL CENTER | Age: 53
End: 2018-01-19
Payer: MEDICAID

## 2018-01-19 ENCOUNTER — APPOINTMENT (OUTPATIENT)
Dept: RADIOLOGY | Facility: MEDICAL CENTER | Age: 53
DRG: 475 | End: 2018-01-19
Attending: INTERNAL MEDICINE
Payer: MEDICAID

## 2018-01-19 LAB
BACTERIA TISS AEROBE CULT: ABNORMAL
GLUCOSE BLD-MCNC: 127 MG/DL (ref 65–99)
GLUCOSE BLD-MCNC: 135 MG/DL (ref 65–99)
GLUCOSE BLD-MCNC: 157 MG/DL (ref 65–99)
GLUCOSE BLD-MCNC: 175 MG/DL (ref 65–99)
GRAM STN SPEC: ABNORMAL
SIGNIFICANT IND 70042: ABNORMAL
SITE SITE: ABNORMAL
SOURCE SOURCE: ABNORMAL

## 2018-01-19 PROCEDURE — 99232 SBSQ HOSP IP/OBS MODERATE 35: CPT | Performed by: HOSPITALIST

## 2018-01-19 PROCEDURE — 700102 HCHG RX REV CODE 250 W/ 637 OVERRIDE(OP): Performed by: INTERNAL MEDICINE

## 2018-01-19 PROCEDURE — A9270 NON-COVERED ITEM OR SERVICE: HCPCS | Performed by: INTERNAL MEDICINE

## 2018-01-19 PROCEDURE — 770021 HCHG ROOM/CARE - ISO PRIVATE

## 2018-01-19 PROCEDURE — 82962 GLUCOSE BLOOD TEST: CPT | Mod: 91

## 2018-01-19 PROCEDURE — 36569 INSJ PICC 5 YR+ W/O IMAGING: CPT

## 2018-01-19 RX ORDER — HEPARIN SODIUM 5000 [USP'U]/ML
5000 INJECTION, SOLUTION INTRAVENOUS; SUBCUTANEOUS EVERY 12 HOURS
Status: DISCONTINUED | OUTPATIENT
Start: 2018-01-19 | End: 2018-01-19

## 2018-01-19 RX ADMIN — ASPIRIN 81 MG: 81 TABLET, COATED ORAL at 07:44

## 2018-01-19 RX ADMIN — LINEZOLID 600 MG: 600 TABLET, FILM COATED ORAL at 07:44

## 2018-01-19 RX ADMIN — OXYCODONE HYDROCHLORIDE 5 MG: 5 TABLET ORAL at 14:57

## 2018-01-19 RX ADMIN — CARVEDILOL 3.12 MG: 3.12 TABLET, FILM COATED ORAL at 07:44

## 2018-01-19 RX ADMIN — LINEZOLID 600 MG: 600 TABLET, FILM COATED ORAL at 20:04

## 2018-01-19 RX ADMIN — CELECOXIB 200 MG: 200 CAPSULE ORAL at 07:44

## 2018-01-19 RX ADMIN — OXYCODONE HYDROCHLORIDE 5 MG: 5 TABLET ORAL at 04:51

## 2018-01-19 RX ADMIN — INSULIN HUMAN 2 UNITS: 100 INJECTION, SOLUTION PARENTERAL at 21:28

## 2018-01-19 RX ADMIN — OXYCODONE HYDROCHLORIDE 5 MG: 5 TABLET ORAL at 10:47

## 2018-01-19 RX ADMIN — INSULIN HUMAN 2 UNITS: 100 INJECTION, SOLUTION PARENTERAL at 10:47

## 2018-01-19 RX ADMIN — CELECOXIB 200 MG: 200 CAPSULE ORAL at 20:04

## 2018-01-19 RX ADMIN — FAMOTIDINE 20 MG: 20 TABLET, FILM COATED ORAL at 20:04

## 2018-01-19 RX ADMIN — CARVEDILOL 3.12 MG: 3.12 TABLET, FILM COATED ORAL at 16:50

## 2018-01-19 RX ADMIN — PRAVASTATIN SODIUM 20 MG: 10 TABLET ORAL at 20:04

## 2018-01-19 ASSESSMENT — ENCOUNTER SYMPTOMS
PSYCHIATRIC NEGATIVE: 1
HEADACHES: 0
NAUSEA: 0
ABDOMINAL PAIN: 0
FEVER: 0
DIARRHEA: 0
PALPITATIONS: 0
FALLS: 0
WEAKNESS: 0
SHORTNESS OF BREATH: 0
VOMITING: 0
CHILLS: 0

## 2018-01-19 ASSESSMENT — PAIN SCALES - GENERAL
PAINLEVEL_OUTOF10: 8
PAINLEVEL_OUTOF10: 0
PAINLEVEL_OUTOF10: 4
PAINLEVEL_OUTOF10: 6
PAINLEVEL_OUTOF10: 6
PAINLEVEL_OUTOF10: 4

## 2018-01-19 NOTE — PROGRESS NOTES
Pt in stable condition, pain controlled at this time, call light within reach, aox4, no complaints or needs at this time, will continue to monitor

## 2018-01-19 NOTE — PROGRESS NOTES
LIMB PRESERVATION SERVICE NOTE:    Wound(s): Right foot first ray amputation. POD #2 - First Dressing Change  Allergies: Pcn [penicillins]  Start of Care: 1/19/2018  Room/Bed  T306/00      Subjective:      History of Present Illness:   Past Medical History:   Diagnosis Date   • Diabetes (CMS-HCC)    • Hypertension        Patient is a 52 y.o. male. Admitted for Lower limb amputation, great toe (CMS-HCC),     Patient is well known to LPS and outpatient wound care services.       Patient presented with S/p R great toe amputation by Dr. Leo on 11/10/17 for infection causing sepsis. Right foot first ray amputation. POD #2 - First Dressing Change    Patient denies fevers chills, nausea, vomiting.     Pain:        States has no pain. Pain on manipulation of dressing on Right Foot        Objective:        PHYSICAL EXAMINATION:     General Appearance:  Well developed,  nourished, in no acute distress    Sensory Assessment       Patient sensation insensate bilaterally with light touch 10 of 10 points        Vascular Assessment       +2 DP, +2 PT bilaterally    Orthotic, protective, supportive devices:     Offloading  Patient in Webster boot on Right LE, HWB OOB    Vitals    /60   Pulse 76   Temp 36.8 °C (98.2 °F)   Resp 16   Ht 1.829 m (6')   Wt (!) 132.5 kg (292 lb)   SpO2 98%   BMI 39.60 kg/m²      Wound Characteristics                                                    Location:   Initial Evaluation  Date:1/19/2018   Tissue Type and %: 100% Red   Periwound: Macerated, Pink   Drainage: Minimal Serosanginous   Exposed structures Sutures 100% Approximated   Wound Edges:   Attached   Odor: None   S&S of Infection:   Edema/Erythema   Edema: Localized at Site, BL +2 LE   Sensation: Insensate               Measurements: Initial Evaluation  Date:1/19/2018   Length (cm) 9   Width (cm)    Depth (cm)    Tract/undermine                Tests and Measures:    Labs  Recent Labs      01/17/18   0359  01/18/18   0338   WBC   6.3  6.9   RBC  4.53*  4.44*   HEMOGLOBIN  12.8*  12.2*   HEMATOCRIT  37.1*  36.5*   MCV  81.9  82.2   MCH  28.3  27.5   MCHC  34.5  33.4*   RDW  42.3  42.4   PLATELETCT  173  173   MPV  10.9  9.8     Recent Labs      01/17/18   0359   SODIUM  135   POTASSIUM  3.7   CHLORIDE  103   CO2  24   GLUCOSE  147*   BUN  11       A1C 9.2 % 11/2017  ESR: 47  CRP: 3.88    SHAYAN    No signs of insufficiency    Infection Management  Microbiology  Bone  Tissue Culture  (Abnormal)   Group D Enterococcus species   Moderate growth   Combination therapy with ampicillin, penicillin, or   vancomycin (for susceptible strains) plus an aminoglycoside   is usually indicated for serious enterococcal infections,   such as endocarditis unless high-level resistance to both   gentamicin and streptomycin is documented; such combinations   are predicted to result in synergistic killing of the   Enterococcus.      Tissue Culture  (Abnormal)   Methicillin Resistant Staphylococcus aureus   Moderate growth   This isolate is presumed to be clindamycin resistant based on   detection of inducible resistance.  Clindamycin may still   be effective in some patients.        Antibiotics Zyvox per ID       Procedures:     Debridement :  None   Cleansed with: Normal Saline (NS)                                                                         Periwound protected with: Skin Prep   Primary dressing: Adaptic   Secondary Dressing: Abdominal Pad   Other: Elastic Bandage     Patient Education:  Implications of loss of protective sensation (LOPS) discussed with patient- including increased risk for amputation.  Advised to check foot at least daily, moisturize foot, and to always wear protective foot wear.    Professional Collaboration: Bedside RN      Assessment:      Wound etiology: Diabetic Foot Wound    Wound Progress:  Initial Assessment    Rationale for Treatment: OM Tx    Patient tolerance/compliance: Open to Education    Complicating factors: DM,  Obesity    Need for ongoing  Wound Care: Nursing to do wound care, LPS will follow      Plan:      Treatment Plan and Recommendations:    Procedures:   Nursing to cleanse wound/periwound with NS.  Pat periwound dry.  Apply skin prep/No Sting to periwound.  Let air dry for 1-2 minutes. Apply single layer adaptic, cut to size, to suture sites. Cover with Abdominal Dressing and secure with kerlix.  Then apply elastic bandage to secure dressings in place.   Notify wound team if wound deteriorates or fails to progress.    Frequency:   NURSING TO CHANGE RIGHT FOOT SURGICAL SITE DRESSING EVERY 48 HOURS AND PRN FOR SATURATION OR DISLODGEMENT      Dressing Orders Updated  Offloading boot for R foot, R foot HWB OOB  AB per ID    Anticipated discharge plans (X):  SNF:           Home Care:           Outpatient Wound Center:            Self Care:            Other:           TBD: X

## 2018-01-19 NOTE — FACE TO FACE
Face to Face Supporting Documentation - Home Health    The encounter with this patient was in whole or in part the primary reason for home health admission.    Date of encounter:   Patient:                    MRN:                       YOB: 2020  Sebastián Castro  [id number]  1965     Home health to see patient for:  Skilled Nursing care for assessment, interventions & education and Wound Care    Skilled need for:  New Onset Medical Diagnosis osteomyelitis and diabetic foot ulcer    Skilled nursing interventions to include:  Home IV infusion therapy    Homebound status evidenced by:  Needs the assistance of another person in order to leave the home. Leaving home requires a considerable and taxing effort. There is a normal inability to leave the home.    Community Physician to provide follow up care: Jerad Lomax M.D.     Optional Interventions? No      I certify the face to face encounter for this home health care referral meets the CMS requirements and the encounter/clinical assessment with the patient was, in whole, or in part, for the medical condition(s) listed above, which is the primary reason for home health care. Based on my clinical findings: the service(s) are medically necessary, support the need for home health care, and the homebound criteria are met.  I certify that this patient has had a face to face encounter by myself.  Sarah Fang M.D. - NPI: [id number]

## 2018-01-19 NOTE — CARE PLAN
Problem: Safety  Goal: Will remain free from injury  Outcome: PROGRESSING AS EXPECTED  Safety precautions in place. Call light within reach. Bed is low and in locked position. Hourly rounding in place.   Pt educated on off loading boot and maintaining heel weight bearing. Pt verbalized understanding.     Problem: Discharge Barriers/Planning  Goal: Patient's continuum of care needs will be met  Outcome: PROGRESSING AS EXPECTED  Pt is pending PICC placement for long term IV antibiotics. Consent signed and placed in pt chart.

## 2018-01-19 NOTE — DISCHARGE PLANNING
Care Transition Team Assessment  Pt has a diabetic foot ulcer at prior amputation site.  Pt requires long term IV abx.  Pt will receive PICC either today or tomorrow.  Pt stated that he does not have transportation.  Uncertain whether pt can administer abx at home.  Pt stated that he has two roommates, maybe one could be taught.  Or pt may require SNF.      Information Source  Orientation : Oriented x 4  Information Given By: Patient  Informant's Name: Sebastián Castro  Who is responsible for making decisions for patient? : Patient    Readmission Evaluation  Is this a readmission?: No    Elopement Risk  Legal Hold: No  Ambulatory or Self Mobile in Wheelchair: Yes  Disoriented: No  Psychiatric Symptoms: None  History of Wandering: No  Elopement this Admit: No  Vocalizing Wanting to Leave: No  Displays Behaviors, Body Language Wanting to Leave: No-Not at Risk for Elopement  Elopement Risk: Not at Risk for Elopement    Interdisciplinary Discharge Planning  Does Admitting Nurse Feel This Could be a Complex Discharge?: No  Primary Care Physician: Dr Lomax  Lives with - Patient's Self Care Capacity: Friends  Patient or legal guardian wants to designate a caregiver (see row info): No  Support Systems: Home Care Staff, Friends / Neighbors  Housing / Facility: [address]  Do You Take your Prescribed Medications Regularly: Yes  Able to Return to Previous ADL's: Yes  Mobility Issues: Yes  Prior Services: Skilled Home Health Services  Patient Expects to be Discharged to:: home  Assistance Needed: Unknown at this Time  Durable Medical Equipment: Walker    Discharge Preparedness  What is your plan after discharge?: Skilled nursing facility, Home health care  What are your discharge supports?: Child  Prior Functional Level: Ambulatory    Functional Assesment  Prior Functional Level: Ambulatory    Finances  Financial Barriers to Discharge: No  Prescription Coverage: Yes    Vision / Hearing Impairment  Vision Impairment : No  Hearing  Impairment : No    Values / Beliefs / Concerns  Values / Beliefs Concerns : No    Domestic Abuse  Have you ever been the victim of abuse or violence?: No  Physical Abuse or Sexual Abuse: No  Verbal Abuse or Emotional Abuse: No  Possible Abuse Reported to::     Discharge Risks or Barriers  Discharge risks or barriers?: No    Anticipated Discharge Information  Anticipated discharge disposition: HHC, SNF

## 2018-01-19 NOTE — PROGRESS NOTES
[facility] Hospitalist Progress Note    Date of Service: 2018    Chief Complaint  52 y.o. male admitted 1/15/2018 with toe pain and drainage from recent amputation.    Interval Problem Update  Concerned that he's in an isolation room. Pain has improved since surgery. No acute overnight events.    Consultants/Specialty  ID  Surgery  DM educators    Disposition  Pending medical clearance.        Review of Systems   Constitutional: Positive for malaise/fatigue. Negative for chills and fever.   Respiratory: Negative for shortness of breath.    Cardiovascular: Negative for chest pain, palpitations and leg swelling.   Gastrointestinal: Negative for abdominal pain, nausea and vomiting.   Genitourinary: Negative for dysuria.   Musculoskeletal: Positive for joint pain. Negative for falls.   Skin:        Foot bandaged and wrapped with and ACE. C/D/I   Neurological: Negative for weakness and headaches.   Psychiatric/Behavioral: Negative.    All other systems reviewed and are negative.     Physical Exam  Laboratory/Imaging   Hemodynamics  Temp (24hrs), Av.5 °C (97.7 °F), Min:36.3 °C (97.3 °F), Max:36.8 °C (98.2 °F)   Temperature: 36.4 °C (97.5 °F)  Pulse  Av.5  Min: 65  Max: 96    Blood Pressure: 103/85      Respiratory      Respiration: 18, Pulse Oximetry: 93 %     Work Of Breathing / Effort: Mild  RUL Breath Sounds: Clear, RML Breath Sounds: Clear, RLL Breath Sounds: Diminished, ELIAS Breath Sounds: Clear, LLL Breath Sounds: Diminished    Fluids    Intake/Output Summary (Last 24 hours) at 18  Last data filed at 18 1200   Gross per 24 hour   Intake              600 ml   Output                0 ml   Net              600 ml       Nutrition  Orders Placed This Encounter   Procedures   • DIET ORDER     Standing Status:   Standing     Number of Occurrences:   1     Order Specific Question:   Diet:     Answer:   Diabetic [3]     Physical Exam   Constitutional: He is oriented to person, place, and time. He  appears well-developed. No distress.   HENT:   Mouth/Throat: Oropharynx is clear and moist.   Eyes: EOM are normal. Pupils are equal, round, and reactive to light. No scleral icterus.   Neck: Normal range of motion. Neck supple.   Cardiovascular: Normal rate and regular rhythm.    Pulmonary/Chest: Effort normal. No respiratory distress.   Abdominal: Soft. He exhibits no distension. There is no tenderness.   Musculoskeletal: He exhibits edema.   Lymphadenopathy:     He has no cervical adenopathy.   Neurological: He is alert and oriented to person, place, and time.   Skin: Skin is warm. There is erythema.    exquisitely tender to touch   Vitals reviewed.      Recent Labs      01/17/18   0359  01/18/18   0338   WBC  6.3  6.9   RBC  4.53*  4.44*   HEMOGLOBIN  12.8*  12.2*   HEMATOCRIT  37.1*  36.5*   MCV  81.9  82.2   MCH  28.3  27.5   MCHC  34.5  33.4*   RDW  42.3  42.4   PLATELETCT  173  173   MPV  10.9  9.8     Recent Labs      01/17/18   0359   SODIUM  135   POTASSIUM  3.7   CHLORIDE  103   CO2  24   GLUCOSE  147*   BUN  11   CREATININE  0.74   CALCIUM  8.8                      Assessment/Plan     Lower limb amputation, great toe (CMS-[facility])- (present on admission)   Assessment & Plan    R great toe amputation with Dr Leo on 11/10/2017 s/p 6 weeks abx. Subsequent infection / OM  - ID and surgery following, greatly appreciate  - s/p surgery 1/17  - abx changed to zyvox, will be dapto as outpatient  - PICC line ordered  - LPS following, greatly appreciate  - pain control    MRI foot:  First metatarsal head edema and enhancement is compatible with osteomyelitis, seen at the deep margin of deep skin ulceration at the amputation stump  Great toe amputation  Cellulitis, granulation tissue, phlegmonous change. No abscess  Medial first metatarsal head sesamoid edema and enhancement could be reactive or from osteomyelitis        HLD (hyperlipidemia)- (present on admission)   Assessment & Plan    Pravastatin        GERD  (gastroesophageal reflux disease)- (present on admission)   Assessment & Plan    Stable.  - Pepcid        Hypertension- (present on admission)   Assessment & Plan    Normotensive.  - Continue carvedilol, lisinopril and amlodipine        Type 2 diabetes mellitus (CMS-[facility])- (present on admission)   Assessment & Plan    Uncontrolled with hyperglycemia. A1C 9.2%. FS have remained > 200.  - Holding oral hypoglycemic agents  - SSI and hypoglycemia protocol  - goal FS> 200 for better wound healing        Obesity- (present on admission)   Assessment & Plan    Body mass index is 39.6 kg/m².            Reviewed items::  Medications reviewed and Labs reviewed  Zabala catheter::  No Zabala  DVT prophylaxis pharmacological::  Heparin  Ulcer Prophylaxis::  Yes

## 2018-01-19 NOTE — PROGRESS NOTES
S:  Seen and examined.  POD #1 s/p first ray amputation.  Doing well this morning.  No new complaints, pain controlled.    O: Blood pressure 112/59, pulse 68, temperature 36.7 °C (98 °F), resp. rate 16, height 1.829 m (6'), weight (!) 132.5 kg (292 lb), SpO2 92 %..    Intake/Output Summary (Last 24 hours) at 01/18/18 2235  Last data filed at 01/18/18 2000   Gross per 24 hour   Intake              950 ml   Output                0 ml   Net              950 ml   .    Operative/injured extremity examined.  Dressing c/d/i    Recent Labs      01/17/18   0359  01/18/18   0338   WBC  6.3  6.9   RBC  4.53*  4.44*   HEMOGLOBIN  12.8*  12.2*   HEMATOCRIT  37.1*  36.5*   MCV  81.9  82.2   MCH  28.3  27.5   MCHC  34.5  33.4*   RDW  42.3  42.4   PLATELETCT  173  173   MPV  10.9  9.8       A/P:    POD #1 s/p R first ray amputation    Antibiotics: Per id  Activity: Heel WBAT operative extremity.  PT today.  Diet: General  DVT: Mechanical (SCDs) + Pharmacologic (per medicine, none required for ortho)  Dispo: D/C planning

## 2018-01-20 ENCOUNTER — APPOINTMENT (OUTPATIENT)
Dept: RADIOLOGY | Facility: MEDICAL CENTER | Age: 53
DRG: 475 | End: 2018-01-20
Attending: HOSPITALIST
Payer: MEDICAID

## 2018-01-20 LAB
ANION GAP SERPL CALC-SCNC: 8 MMOL/L (ref 0–11.9)
BACTERIA SPEC ANAEROBE CULT: NORMAL
BACTERIA SPEC ANAEROBE CULT: NORMAL
BUN SERPL-MCNC: 19 MG/DL (ref 8–22)
CALCIUM SERPL-MCNC: 8.5 MG/DL (ref 8.5–10.5)
CHLORIDE SERPL-SCNC: 103 MMOL/L (ref 96–112)
CO2 SERPL-SCNC: 26 MMOL/L (ref 20–33)
CREAT SERPL-MCNC: 0.79 MG/DL (ref 0.5–1.4)
ERYTHROCYTE [DISTWIDTH] IN BLOOD BY AUTOMATED COUNT: 44.2 FL (ref 35.9–50)
GLUCOSE BLD-MCNC: 121 MG/DL (ref 65–99)
GLUCOSE BLD-MCNC: 148 MG/DL (ref 65–99)
GLUCOSE BLD-MCNC: 189 MG/DL (ref 65–99)
GLUCOSE SERPL-MCNC: 136 MG/DL (ref 65–99)
HCT VFR BLD AUTO: 35.6 % (ref 42–52)
HGB BLD-MCNC: 12 G/DL (ref 14–18)
MCH RBC QN AUTO: 27.9 PG (ref 27–33)
MCHC RBC AUTO-ENTMCNC: 33.7 G/DL (ref 33.7–35.3)
MCV RBC AUTO: 82.8 FL (ref 81.4–97.8)
PLATELET # BLD AUTO: 201 K/UL (ref 164–446)
PMV BLD AUTO: 10.1 FL (ref 9–12.9)
POTASSIUM SERPL-SCNC: 3.7 MMOL/L (ref 3.6–5.5)
RBC # BLD AUTO: 4.3 M/UL (ref 4.7–6.1)
SIGNIFICANT IND 70042: NORMAL
SITE SITE: NORMAL
SODIUM SERPL-SCNC: 137 MMOL/L (ref 135–145)
SOURCE SOURCE: NORMAL
WBC # BLD AUTO: 6.6 K/UL (ref 4.8–10.8)

## 2018-01-20 PROCEDURE — 700102 HCHG RX REV CODE 250 W/ 637 OVERRIDE(OP): Performed by: INTERNAL MEDICINE

## 2018-01-20 PROCEDURE — 99232 SBSQ HOSP IP/OBS MODERATE 35: CPT | Performed by: HOSPITALIST

## 2018-01-20 PROCEDURE — 700105 HCHG RX REV CODE 258: Performed by: INTERNAL MEDICINE

## 2018-01-20 PROCEDURE — 770021 HCHG ROOM/CARE - ISO PRIVATE

## 2018-01-20 PROCEDURE — 700111 HCHG RX REV CODE 636 W/ 250 OVERRIDE (IP): Performed by: INTERNAL MEDICINE

## 2018-01-20 PROCEDURE — A9270 NON-COVERED ITEM OR SERVICE: HCPCS | Performed by: INTERNAL MEDICINE

## 2018-01-20 PROCEDURE — 80048 BASIC METABOLIC PNL TOTAL CA: CPT

## 2018-01-20 PROCEDURE — B548ZZA ULTRASONOGRAPHY OF SUPERIOR VENA CAVA, GUIDANCE: ICD-10-PCS | Performed by: INTERNAL MEDICINE

## 2018-01-20 PROCEDURE — 82962 GLUCOSE BLOOD TEST: CPT | Mod: 91

## 2018-01-20 PROCEDURE — 85027 COMPLETE CBC AUTOMATED: CPT

## 2018-01-20 PROCEDURE — 02HV33Z INSERTION OF INFUSION DEVICE INTO SUPERIOR VENA CAVA, PERCUTANEOUS APPROACH: ICD-10-PCS | Performed by: INTERNAL MEDICINE

## 2018-01-20 PROCEDURE — 700105 HCHG RX REV CODE 258

## 2018-01-20 RX ORDER — SODIUM CHLORIDE 9 MG/ML
INJECTION, SOLUTION INTRAVENOUS
Status: COMPLETED
Start: 2018-01-20 | End: 2018-01-20

## 2018-01-20 RX ADMIN — AMPICILLIN SODIUM AND SULBACTAM SODIUM 3 G: 2; 1 INJECTION, POWDER, FOR SOLUTION INTRAMUSCULAR; INTRAVENOUS at 18:28

## 2018-01-20 RX ADMIN — LINEZOLID 600 MG: 600 TABLET, FILM COATED ORAL at 21:33

## 2018-01-20 RX ADMIN — OXYCODONE HYDROCHLORIDE 5 MG: 5 TABLET ORAL at 21:41

## 2018-01-20 RX ADMIN — OXYCODONE HYDROCHLORIDE 5 MG: 5 TABLET ORAL at 04:08

## 2018-01-20 RX ADMIN — INSULIN HUMAN 2 UNITS: 100 INJECTION, SOLUTION PARENTERAL at 11:03

## 2018-01-20 RX ADMIN — PRAVASTATIN SODIUM 20 MG: 10 TABLET ORAL at 21:32

## 2018-01-20 RX ADMIN — OXYCODONE HYDROCHLORIDE 5 MG: 5 TABLET ORAL at 16:43

## 2018-01-20 RX ADMIN — AMPICILLIN SODIUM AND SULBACTAM SODIUM: 100; 50 INJECTION, POWDER, FOR SOLUTION INTRAVENOUS at 18:30

## 2018-01-20 RX ADMIN — SODIUM CHLORIDE 500 ML: 9 INJECTION, SOLUTION INTRAVENOUS at 20:00

## 2018-01-20 RX ADMIN — OXYCODONE HYDROCHLORIDE 5 MG: 5 TABLET ORAL at 10:58

## 2018-01-20 RX ADMIN — CELECOXIB 200 MG: 200 CAPSULE ORAL at 08:03

## 2018-01-20 RX ADMIN — INSULIN HUMAN 2 UNITS: 100 INJECTION, SOLUTION PARENTERAL at 21:00

## 2018-01-20 RX ADMIN — CARVEDILOL 3.12 MG: 3.12 TABLET, FILM COATED ORAL at 08:03

## 2018-01-20 RX ADMIN — CARVEDILOL 3.12 MG: 3.12 TABLET, FILM COATED ORAL at 16:43

## 2018-01-20 RX ADMIN — ASPIRIN 81 MG: 81 TABLET, COATED ORAL at 08:03

## 2018-01-20 RX ADMIN — LINEZOLID 600 MG: 600 TABLET, FILM COATED ORAL at 08:03

## 2018-01-20 RX ADMIN — FAMOTIDINE 20 MG: 20 TABLET, FILM COATED ORAL at 21:32

## 2018-01-20 RX ADMIN — CELECOXIB 200 MG: 200 CAPSULE ORAL at 21:33

## 2018-01-20 ASSESSMENT — PAIN SCALES - GENERAL
PAINLEVEL_OUTOF10: 5
PAINLEVEL_OUTOF10: 8
PAINLEVEL_OUTOF10: 4
PAINLEVEL_OUTOF10: 3
PAINLEVEL_OUTOF10: 2
PAINLEVEL_OUTOF10: 0
PAINLEVEL_OUTOF10: 7
PAINLEVEL_OUTOF10: 2
PAINLEVEL_OUTOF10: 8
PAINLEVEL_OUTOF10: 1
PAINLEVEL_OUTOF10: 0
PAINLEVEL_OUTOF10: 5

## 2018-01-20 ASSESSMENT — ENCOUNTER SYMPTOMS
FEVER: 0
NAUSEA: 0
DIARRHEA: 0
ABDOMINAL PAIN: 0
MYALGIAS: 1
CHILLS: 0
WEAKNESS: 0
PSYCHIATRIC NEGATIVE: 1
FALLS: 0
PALPITATIONS: 0
SHORTNESS OF BREATH: 0
HEADACHES: 0
VOMITING: 0

## 2018-01-20 ASSESSMENT — LIFESTYLE VARIABLES: DO YOU DRINK ALCOHOL: NO

## 2018-01-20 NOTE — CARE PLAN
Problem: Communication  Goal: The ability to communicate needs accurately and effectively will improve  Outcome: PROGRESSING AS EXPECTED  Pt calls for help appropriately on call light. Call light with in reach. Hourly rounding in place. Pt has no needs or concerns at this time.         Problem: Venous Thromboembolism (VTW)/Deep Vein Thrombosis (DVT) Prevention:  Goal: Patient will participate in Venous Thrombosis (VTE)/Deep Vein Thrombosis (DVT)Prevention Measures  Outcome: PROGRESSING AS EXPECTED      Problem: Fluid Volume:  Goal: Will maintain balanced intake and output  Outcome: PROGRESSING AS EXPECTED  Pt is voiding appropriately and taking in adequate oral fluids.

## 2018-01-20 NOTE — PROGRESS NOTES
[facility] Hospitalist Progress Note    Date of Service: 2018    Chief Complaint  52 y.o. male admitted 1/15/2018 with toe pain and drainage from recent amputation.    Interval Problem Update  Doing well. No questions or concerns at this time. No acute overnight events.    Consultants/Specialty  ID  Surgery  DM educators    Disposition  Pending medical clearance.        Review of Systems   Constitutional: Positive for malaise/fatigue. Negative for chills and fever.   Respiratory: Negative for shortness of breath.    Cardiovascular: Negative for chest pain, palpitations and leg swelling.   Gastrointestinal: Negative for abdominal pain, nausea and vomiting.   Genitourinary: Negative for dysuria.   Musculoskeletal: Positive for joint pain. Negative for falls.   Skin:        Foot bandaged and wrapped with and ACE. C/D/I   Neurological: Negative for weakness and headaches.   Psychiatric/Behavioral: Negative.    All other systems reviewed and are negative.     Physical Exam  Laboratory/Imaging   Hemodynamics  Temp (24hrs), Av.7 °C (98 °F), Min:36.4 °C (97.5 °F), Max:36.8 °C (98.2 °F)   Temperature: 36.7 °C (98.1 °F)  Pulse  Av.6  Min: 65  Max: 96    Blood Pressure: 108/57      Respiratory      Respiration: 20, Pulse Oximetry: 98 %     Work Of Breathing / Effort: Mild  RUL Breath Sounds: Clear, RML Breath Sounds: Clear, RLL Breath Sounds: Diminished, ELIAS Breath Sounds: Clear, LLL Breath Sounds: Diminished    Fluids    Intake/Output Summary (Last 24 hours) at 18 1649  Last data filed at 18 0800   Gross per 24 hour   Intake              830 ml   Output                0 ml   Net              830 ml       Nutrition  Orders Placed This Encounter   Procedures   • DIET ORDER     Standing Status:   Standing     Number of Occurrences:   1     Order Specific Question:   Diet:     Answer:   Diabetic [3]     Physical Exam   Constitutional: He is oriented to person, place, and time. He appears well-developed. No  distress.   HENT:   Mouth/Throat: Oropharynx is clear and moist.   Eyes: EOM are normal. Pupils are equal, round, and reactive to light. No scleral icterus.   Neck: Normal range of motion. Neck supple.   Cardiovascular: Normal rate and regular rhythm.    Pulmonary/Chest: Effort normal. No respiratory distress.   Abdominal: Soft. He exhibits no distension. There is no tenderness.   Musculoskeletal: He exhibits edema.   Lymphadenopathy:     He has no cervical adenopathy.   Neurological: He is alert and oriented to person, place, and time.   Skin: Skin is warm. There is erythema.   Tenderness to touch of right leg greatly improved; right foot in a boot   Psychiatric: He has a normal mood and affect. His speech is normal.   Vitals reviewed.      Recent Labs      01/17/18   0359  01/18/18   0338   WBC  6.3  6.9   RBC  4.53*  4.44*   HEMOGLOBIN  12.8*  12.2*   HEMATOCRIT  37.1*  36.5*   MCV  81.9  82.2   MCH  28.3  27.5   MCHC  34.5  33.4*   RDW  42.3  42.4   PLATELETCT  173  173   MPV  10.9  9.8     Recent Labs      01/17/18   0359   SODIUM  135   POTASSIUM  3.7   CHLORIDE  103   CO2  24   GLUCOSE  147*   BUN  11   CREATININE  0.74   CALCIUM  8.8                      Assessment/Plan     Lower limb amputation, great toe (CMS-[facility])- (present on admission)   Assessment & Plan    R great toe amputation with Dr Leo on 11/10/2017 s/p 6 weeks abx. Subsequent infection / OM  - ID and surgery following, greatly appreciate  - s/p surgery 1/17  - abx changed to zyvox, will be dapto as outpatient  - PICC line pending  - LPS following, greatly appreciate  - pain control    MRI foot:  First metatarsal head edema and enhancement is compatible with osteomyelitis, seen at the deep margin of deep skin ulceration at the amputation stump  Great toe amputation  Cellulitis, granulation tissue, phlegmonous change. No abscess  Medial first metatarsal head sesamoid edema and enhancement could be reactive or from osteomyelitis        Type 2  diabetes mellitus (CMS-HCC)- (present on admission)   Assessment & Plan    Uncontrolled with hyperglycemia. A1C 9.2%. FS have remained < 200.  - Holding oral hypoglycemic agents  - SSI and hypoglycemia protocol  - goal FS< 200 for better wound healing        HLD (hyperlipidemia)- (present on admission)   Assessment & Plan    Pravastatin        GERD (gastroesophageal reflux disease)- (present on admission)   Assessment & Plan    Stable.  - Pepcid        Hypertension- (present on admission)   Assessment & Plan    Normotensive.  - Continue carvedilol, lisinopril and amlodipine        Obesity- (present on admission)   Assessment & Plan    Body mass index is 39.6 kg/m².            Reviewed items::  Medications reviewed and Labs reviewed  Zabala catheter::  No Zabala  DVT: holding heparin for picc line.  DVT prophylaxis - mechanical:  SCDs  Ulcer Prophylaxis::  Yes

## 2018-01-20 NOTE — PROGRESS NOTES
Discussed with Dr. Holley.  Wound looks more red and swollen.  Contacted Sathish with [facility].  Sathish to come look at wound.

## 2018-01-20 NOTE — PROGRESS NOTES
LIMB PRESERVATION SERVICE NOTE:    Wound(s): Right foot first ray amputation. POD #3- First Dressing Change  Reconsulted by ID per Bedside RN  Allergies: Pcn [penicillins]  Start of Care: 1/19/2018  Room/Bed  T306/00      Subjective:      History of Present Illness:   Past Medical History:   Diagnosis Date   • Diabetes (CMS-HCC)    • Hypertension        Patient is a 52 y.o. male. Admitted for Lower limb amputation, great toe (CMS-HCC),     Patient is well known to [facility] and outpatient wound care services.       Patient presented with S/p R great toe amputation by Dr. Leo on 11/10/17 for infection causing sepsis. Right foot first ray amputation. POD #2 - First Dressing Change    Patient denies fevers chills, nausea, vomiting.     Pain:        States has no pain. Pain on manipulation of dressing on Right Foot        Objective:        PHYSICAL EXAMINATION:     General Appearance:  Well developed,  nourished, in no acute distress    Sensory Assessment       Patient sensation insensate bilaterally with light touch 10 of 10 points        Vascular Assessment       +2 DP, +2 PT bilaterally    Orthotic, protective, supportive devices:     Offloading  Patient in [place] boot on Right LE, HWB OOB    Vitals    BP (!) 98/55   Pulse 80   Temp 36.3 °C (97.3 °F)   Resp 18   Ht 1.829 m (6')   Wt (!) 132.5 kg (292 lb)   SpO2 98%   BMI 39.60 kg/m²      Wound Characteristics                                                    Location:   Initial Evaluation  Date:1/19/2018 1/20/18   Tissue Type and %: 100% Red 100% Red   Periwound: Macerated, Pink Pink, Red   Drainage: Minimal Serosanginous Scant Serous   Exposed structures Sutures 100% Approximated Sutures 100% Approximated   Wound Edges:   Attached Attached   Odor: None None   S&S of Infection:   Edema/Erythema Edema/Erythema   Edema: Localized at Site, BL +2 LE Localized at Site, BL +2 LE   Sensation: Insensate Insensate               Measurements: Initial  Evaluation  Date:1/19/2018   Length (cm) 9   Width (cm)    Depth (cm)    Tract/undermine                Tests and Measures:    Labs  Recent Labs      01/18/18   0338  01/20/18   0359   WBC  6.9  6.6   RBC  4.44*  4.30*   HEMOGLOBIN  12.2*  12.0*   HEMATOCRIT  36.5*  35.6*   MCV  82.2  82.8   MCH  27.5  27.9   MCHC  33.4*  33.7   RDW  42.4  44.2   PLATELETCT  173  201   MPV  9.8  10.1     Recent Labs      01/20/18   0359   SODIUM  137   POTASSIUM  3.7   CHLORIDE  103   CO2  26   GLUCOSE  136*   BUN  19       A1C 9.2 % 11/2017  ESR: 47  CRP: 3.88    SHAYAN    No signs of insufficiency    Infection Management  Microbiology  Bone  Tissue Culture  (Abnormal)   Group D Enterococcus species   Moderate growth   Combination therapy with ampicillin, penicillin, or   vancomycin (for susceptible strains) plus an aminoglycoside   is usually indicated for serious enterococcal infections,   such as endocarditis unless high-level resistance to both   gentamicin and streptomycin is documented; such combinations   are predicted to result in synergistic killing of the   Enterococcus.      Tissue Culture  (Abnormal)   Methicillin Resistant Staphylococcus aureus   Moderate growth   This isolate is presumed to be clindamycin resistant based on   detection of inducible resistance.  Clindamycin may still   be effective in some patients.        Antibiotics Zyvox per ID       Procedures:     Debridement :  None   Cleansed with: Normal Saline (NS)                                                                         Periwound protected with: Skin Prep   Primary dressing: Adaptic   Secondary Dressing: Abdominal Pad   Other: Elastic Bandage     Patient Education:  Implications of loss of protective sensation (LOPS) discussed with patient- including increased risk for amputation.  Advised to check foot at least daily, moisturize foot, and to always wear protective foot wear.    Professional Collaboration: Bedside RNDr. Delong      Assessment:       Wound etiology: Diabetic Foot Wound    Wound Progress:  Edema and Erythema increased. Output less. Macerated Tissue Pink.    Rationale for Treatment: OM Tx    Patient tolerance/compliance: Open to Education    Complicating factors: DM, Obesity    Need for ongoing  Wound Care: Nursing to do wound care, LPS will follow      Plan:      Treatment Plan and Recommendations:    Procedures:   Nursing to cleanse wound/periwound with NS.  Pat periwound dry.  Apply skin prep/No Sting to periwound.  Let air dry for 1-2 minutes. Apply single layer Xeroform, cut to size, to suture sites. Cover with Abdominal Dressing and secure with kerlix.  Then apply elastic bandage to secure dressings in place.   Notify wound team if wound deteriorates or fails to progress.    Frequency:   NURSING TO CHANGE RIGHT FOOT SURGICAL SITE DRESSING EVERY 48 HOURS AND PRN FOR SATURATION OR DISLODGEMENT    Walker involved - Will continue to monitor, too early to consider I&D  Dressing Orders Updated  Offloading boot for R foot, R foot HWB OOB  AB per ID      Anticipated discharge plans (X):  SNF:           Home Care:           Outpatient Wound Center:            Self Care:            Other:           TBD: X

## 2018-01-20 NOTE — PROGRESS NOTES
Assumed care of pt 1845. Pt resting in bed.  Reviewed POC including safety, mobility, pain management, medication administration, DVT prophylaxis, and discharge. Call light within reach. Pt calls appropriately. Hourly rounding in place. Pt has no needs or concerns at this time.

## 2018-01-20 NOTE — PROGRESS NOTES
PICC Insertion Final 3CG    Consents confirmed, vessel patency confirmed with ultrasound. Risks and benefits of procedure explained to patient/family and education regarding central line associated bloodstream infections provided. Questions answered.     PICC placed in LUE per MD order with ultrasound guidance. 4 Fr, Single lumen PICC placed in Basilic vein after 1 attempt(s). 4 cc's of 1% lidocaine injected intradermally, 21 gauge microintroducer needle and modified Seldinger technique used. 48 cm catheter inserted with good blood return. Secured at 0cm marker. Each lumen flushed without resistance with 10 mL 0.9% normal saline. PICC line secured with Biopatch and Tegaderm.    PICC placement is confirmed by nurse using 3CG technology. PICC line is appropriate for use at this time. Pt tolerated procedure well. Patient condition relayed to unit RN or ordering physician via this post procedure note in the EMR.     [employer] PICC ref # [id number], Lot # [id number]

## 2018-01-20 NOTE — CARE PLAN
Problem: Infection  Goal: Will remain free from infection  Patient with active MRSA infection in wound.  Toe amputated.      Problem: Venous Thromboembolism (VTW)/Deep Vein Thrombosis (DVT) Prevention:  Goal: Patient will participate in Venous Thrombosis (VTE)/Deep Vein Thrombosis (DVT)Prevention Measures  SCDs    Problem: Bowel/Gastric:  Goal: Will not experience complications related to bowel motility  Patient repots that he had a bowel movement this morning.    Problem: Pain Management  Goal: Pain level will decrease to patient's comfort goal  Patient denies pain this morning.

## 2018-01-20 NOTE — PROGRESS NOTES
Infectious Disease Progress Note    Author: Cristy Holley M.D. Date & Time of service: 2018  4:04 PM    Chief Complaint:  diabetic foot ulcer at surgical site      Interval History:  52 y.o. diabetic male admitted 1/15/2018 pain/open wound at the site of right great toe amputation   AF WBC 8.5 seen with LPS Tolerating abx well   Af tolerating abx well-plan for OR today   AF tolerated surgery well-had a lot of nausea with morphine   AF no new complaints awaiting PICC  Labs Reviewed, Medications Reviewed, Radiology Reviewed and Wound Reviewed.    Review of Systems:  Review of Systems   Constitutional: Negative for chills and fever.   Gastrointestinal: Negative for abdominal pain, diarrhea and vomiting.   Musculoskeletal: Positive for joint pain.   All other systems reviewed and are negative.      Hemodynamics:  Temp (24hrs), Av.6 °C (97.9 °F), Min:36.4 °C (97.5 °F), Max:36.8 °C (98.2 °F)  Temperature: 36.8 °C (98.2 °F)  Pulse  Av.9  Min: 65  Max: 96   Blood Pressure: 100/60       Physical Exam:  Physical Exam   Constitutional: He is oriented to person, place, and time. He appears well-developed. No distress.   Obese   HENT:   Head: Normocephalic and atraumatic.   Eyes: EOM are normal. Pupils are equal, round, and reactive to light.   Neck: Neck supple.   Cardiovascular: Normal rate.    Pulmonary/Chest: Effort normal and breath sounds normal. No respiratory distress.   Abdominal: Soft. He exhibits no distension. There is no tenderness.   Musculoskeletal: He exhibits edema and tenderness.   Foot dressed   Neurological: He is alert and oriented to person, place, and time.   Skin: There is erythema.   Nursing note and vitals reviewed.      Meds:    Current Facility-Administered Medications:   •  amlodipine  •  aspirin  •  carvedilol  •  celecoxib  •  famotidine  •  lisinopril  •  pravastatin  •  senna-docusate **AND** polyethylene glycol/lytes **AND** magnesium hydroxide **AND**  bisacodyl  •  Respiratory Care per Protocol  •  acetaminophen  •  Notify provider if pain remains uncontrolled **AND** Use the numeric rating scale (NRS-11) on regular floors and Critical-Care Pain Observation Tool (CPOT) on ICUs/Trauma to assess pain **AND** Pulse Ox (Oximetry) **AND** Pharmacy Consult Request **AND** If patient difficult to arouse and/or has respiratory depression, stop any opiates that are currently infusing and call a Rapid Response. **AND** oxycodone immediate-release **AND** oxycodone immediate-release **AND** morphine injection  •  insulin regular **AND** Accu-Chek ACHS **AND** NOTIFY MD and PharmD **AND** glucose 4 g **AND** dextrose 50%  •  ondansetron  •  ondansetron  •  linezolid    Labs:  Recent Labs      01/17/18   0359  01/18/18   0338   WBC  6.3  6.9   RBC  4.53*  4.44*   HEMOGLOBIN  12.8*  12.2*   HEMATOCRIT  37.1*  36.5*   MCV  81.9  82.2   MCH  28.3  27.5   RDW  42.3  42.4   PLATELETCT  173  173   MPV  10.9  9.8     Recent Labs      01/17/18   0359   SODIUM  135   POTASSIUM  3.7   CHLORIDE  103   CO2  24   GLUCOSE  147*   BUN  11     Recent Labs      01/17/18   0359   CREATININE  0.74       Imaging:  Dx-foot-complete 3+ Right    Result Date: 1/15/2018  1/15/2018 1:07 PM HISTORY/REASON FOR EXAM:  Atraumatic Pain/Swelling/Deformity Right foot pain s/p Great toe amputation. Pt had a recent toe amputation on the right foot. Home health nurse checked his foot this morning and sts his open wound is full thickness and needed to come in TECHNIQUE/EXAM DESCRIPTION AND NUMBER OF VIEWS: 3 views of the RIGHT foot. COMPARISON:  11/10/2017 FINDINGS: Prior amputation of great toe at the level of the metatarsophalangeal joint. Focal soft tissue swelling over the distal 1st metatarsal.  Lucency within soft tissues consistent with ulceration.  No soft tissue gas or radiopaque foreign body.  No focal bony destruction. Degenerative change of the midfoot. Calcaneal enthesophytes present. Forefoot and  ankle soft tissue swelling.     1.  Prior great toe amputation. 2.  RIGHT forefoot soft tissue swelling with ulceration overlying the distal 1st metatarsal head. 3.  Forefoot and ankle soft tissue swelling. 4.  No gross bone destruction, however osteomyelitis is not excluded by plain film alone.      Micro:  Results     Procedure Component Value Units Date/Time    CULTURE TISSUE W/ GRM STAIN [[id number]]  (Abnormal)  (Susceptibility) Collected:  01/17/18 1355    Order Status:  Completed Specimen:  Bone Updated:  01/19/18 1120     Significant Indicator POS (POS)     Source BONE     Site Right Foot Sesamoid     Tissue Culture -- (A)     Gram Stain Result No organisms seen.     Tissue Culture -- (A)     Group D Enterococcus species  Moderate growth  Combination therapy with ampicillin, penicillin, or  vancomycin (for susceptible strains) plus an aminoglycoside  is usually indicated for serious enterococcal infections,  such as endocarditis unless high-level resistance to both  gentamicin and streptomycin is documented; such combinations  are predicted to result in synergistic killing of the  Enterococcus.       Tissue Culture -- (A)     Methicillin Resistant Staphylococcus aureus  Moderate growth  This isolate is presumed to be clindamycin resistant based on  detection of inducible resistance.  Clindamycin may still  be effective in some patients.      Narrative:       CALL  [place]  131 tel. [phone],    Culture & Susceptibility     METHICILLIN RESISTANT STAPHYLOCOCCUS AUREUS     Antibiotic Sensitivity Microscan Unit Status    Ampicillin/sulbactam Resistant 16/8 mcg/mL Preliminary    Clindamycin Resistant <=0.5 mcg/mL Preliminary    Daptomycin Sensitive 1 mcg/mL Preliminary    Erythromycin Resistant >4 mcg/mL Preliminary    Moxifloxacin Resistant >4 mcg/mL Preliminary    Oxacillin Resistant >2 mcg/mL Preliminary    Penicillin Resistant >8 mcg/mL Preliminary    Tetracycline Sensitive <=4 mcg/mL Preliminary     Trimeth/Sulfa Sensitive <=0.5/9.5 mcg/mL Preliminary    Vancomycin Sensitive 2 mcg/mL Preliminary                       ANAEROBIC CULTURE [[id number]] Collected:  01/17/18 1355    Order Status:  Completed Specimen:  Bone Updated:  01/19/18 1120     Significant Indicator NEG     Source BONE     Site Right Foot Sesamoid     Anaerobic Culture, Culture Res Culture in progress.    Narrative:       CALL  [place]  131 tel. [phone],    FUNGAL CULTURE [[id number]] Collected:  01/17/18 1355    Order Status:  Completed Specimen:  Bone Updated:  01/19/18 1120     Significant Indicator NEG     Source BONE     Site Right Foot Sesamoid     Fungal Culture Culture in progress.    Narrative:       CALL  [place]  131 tel. [phone],    AFB CULTURE [[id number]] Collected:  01/17/18 1355    Order Status:  Completed Specimen:  Bone Updated:  01/19/18 1120     Significant Indicator NEG     Source BONE     Site Right Foot Sesamoid     AFB Culture Culture in progress.     AFB Smear Results No acid fast bacilli seen.    Narrative:       CALL  [place]  131 tel. [phone],    GRAM STAIN [[id number]] Collected:  01/17/18 1355    Order Status:  Completed Specimen:  Bone Updated:  01/18/18 1639     Significant Indicator .     Source BONE     Site Right Foot Sesamoid     Gram Stain Result No organisms seen.    ACID FAST STAIN [[id number]] Collected:  01/17/18 1355    Order Status:  Completed Specimen:  Bone Updated:  01/18/18 1639     Significant Indicator NEG     Source BONE     Site Right Foot Sesamoid     AFB Smear Results No acid fast bacilli seen.    CULTURE WOUND W/ GRAM STAIN [[id number]]  (Abnormal)  (Susceptibility) Collected:  01/15/18 1216    Order Status:  Completed Specimen:  Wound from Right Foot Updated:  01/17/18 1609     Gram Stain Result --     Moderate WBCs.  Many Gram positive cocci.  Many Gram positive rods.       Significant Indicator POS (POS)     Source WND     Site RIGHT FOOT     Culture Result Wound -- (A)     Culture Result Wound  -- (A)     Methicillin Resistant Staphylococcus aureus  Heavy growth  This isolate is presumed to be clindamycin resistant based on  detection of inducible resistance.  Clindamycin may still  be effective in some patients.       Culture Result Wound -- (A)     Enterococcus faecalis  Heavy growth  Combination therapy with ampicillin, penicillin, or  vancomycin (for susceptible strains) plus an aminoglycoside  is usually indicated for serious enterococcal infections,  such as endocarditis unless high-level resistance to both  gentamicin and streptomycin is documented; such combinations  are predicted to result in synergistic killing of the  Enterococcus.  The susceptibility profile for this organism indicates that  Streptomycin would not be an effective component of  combination therapy.  The susceptibility profile for this organism indicates that  Gentamycin would not be an effective component of combination  therapy.      Narrative:       CALL  [place]  131 tel. [phone],  CALLED  131 tel. [phone] 01/17/2018, 16:09, RB PERF. RESULTS CALLED  TO:[id number],RN;fax to inf ctl    Culture & Susceptibility     ENTEROCOCCUS FAECALIS     Antibiotic Sensitivity Microscan Unit Status    Ampicillin Sensitive <=2 mcg/mL Final    Daptomycin Sensitive 2 mcg/mL Final    Gent Synergy Resistant >500 mcg/mL Final    Penicillin Sensitive 8 mcg/mL Final    Strep Synergy Resistant >1000 mcg/mL Final    Vancomycin Sensitive 2 mcg/mL Final              METHICILLIN RESISTANT STAPHYLOCOCCUS AUREUS     Antibiotic Sensitivity Microscan Unit Status    Ampicillin/sulbactam Resistant 16/8 mcg/mL Final    Clindamycin Resistant <=0.5 mcg/mL Final    Daptomycin Sensitive <=0.5 mcg/mL Final    Erythromycin Resistant >4 mcg/mL Final    Moxifloxacin Resistant >4 mcg/mL Final    Oxacillin Resistant >2 mcg/mL Final    Penicillin Resistant >8 mcg/mL Final    Tetracycline Sensitive <=4 mcg/mL Final    Trimeth/Sulfa Sensitive <=0.5/9.5 mcg/mL Final     Vancomycin Sensitive 2 mcg/mL Final                       GRAM STAIN [[id number]] Collected:  01/15/18 1216    Order Status:  Completed Specimen:  Wound Updated:  01/15/18 1821     Significant Indicator .     Source WND     Site RIGHT FOOT     Gram Stain Result --     Moderate WBCs.  Many Gram positive cocci.  Many Gram positive rods.            Assessment:  Active Hospital Problems    Diagnosis   • Lower limb amputation, great toe (CMS-HCC) [Z89.419]   • GERD (gastroesophageal reflux disease) [K21.9]   • HLD (hyperlipidemia) [E78.5]   • Hypertension [I10]   • Type 2 diabetes mellitus (CMS-HCC) [E11.9]   • Obesity [E66.9]       Plan:  Diabetic foot ulcer at prior amputation site, right great toe    Afebrile  No leukocytosis  S/p right great toe amputation with Dr Leo on 11/10/2017  S/p 6 week IV abx  Failure to completely heal-Recent wound culture with resistant entercoccus  Current wound cxs with MRSA and enterococcus  MRI+ OM-clinical OM  SHAYAN OK in 11/2017  s/p ray amp 1/17  Continue Zyvox  Anticipate 6 weeks abx post-op-continue Zyvox while in hosp  Dapto as outpatient  PICC ordered     Type 2 diabetes mellitus    Keep BS under 150 to help control current infection  Hgb A1C in 11/2017 =9.6

## 2018-01-20 NOTE — PROGRESS NOTES
Received bedside report from night shift RN. Assumed care of patient. Pt assessed and stable. VSS. Patient reports 0/10 pain at this time.  Discussed plan of care for day with patient and received verbal understanding. Call light within reach, bed in low position.  Educated pt re fall precautions and received verbal understanding.  However, pt continues to refuse bed alarms.  Pt is alert, oriented, steady on feet and calls appropriately.

## 2018-01-21 LAB
GLUCOSE BLD-MCNC: 129 MG/DL (ref 65–99)
GLUCOSE BLD-MCNC: 155 MG/DL (ref 65–99)
GLUCOSE BLD-MCNC: 155 MG/DL (ref 65–99)
GLUCOSE BLD-MCNC: 157 MG/DL (ref 65–99)
GLUCOSE BLD-MCNC: 177 MG/DL (ref 65–99)

## 2018-01-21 PROCEDURE — 700105 HCHG RX REV CODE 258: Performed by: INTERNAL MEDICINE

## 2018-01-21 PROCEDURE — A9270 NON-COVERED ITEM OR SERVICE: HCPCS | Performed by: INTERNAL MEDICINE

## 2018-01-21 PROCEDURE — 700102 HCHG RX REV CODE 250 W/ 637 OVERRIDE(OP): Performed by: INTERNAL MEDICINE

## 2018-01-21 PROCEDURE — 700111 HCHG RX REV CODE 636 W/ 250 OVERRIDE (IP): Performed by: INTERNAL MEDICINE

## 2018-01-21 PROCEDURE — 82962 GLUCOSE BLOOD TEST: CPT

## 2018-01-21 PROCEDURE — 700111 HCHG RX REV CODE 636 W/ 250 OVERRIDE (IP): Performed by: HOSPITALIST

## 2018-01-21 PROCEDURE — 99232 SBSQ HOSP IP/OBS MODERATE 35: CPT | Performed by: HOSPITALIST

## 2018-01-21 PROCEDURE — 770021 HCHG ROOM/CARE - ISO PRIVATE

## 2018-01-21 RX ORDER — DIPHENHYDRAMINE HYDROCHLORIDE 50 MG/ML
25 INJECTION INTRAMUSCULAR; INTRAVENOUS EVERY 6 HOURS PRN
Status: DISCONTINUED | OUTPATIENT
Start: 2018-01-21 | End: 2018-01-24 | Stop reason: HOSPADM

## 2018-01-21 RX ORDER — DIPHENHYDRAMINE HYDROCHLORIDE 50 MG/ML
25 INJECTION INTRAMUSCULAR; INTRAVENOUS ONCE
Status: COMPLETED | OUTPATIENT
Start: 2018-01-21 | End: 2018-01-21

## 2018-01-21 RX ADMIN — CELECOXIB 200 MG: 200 CAPSULE ORAL at 21:17

## 2018-01-21 RX ADMIN — ALTEPLASE 2 MG: 2.2 INJECTION, POWDER, LYOPHILIZED, FOR SOLUTION INTRAVENOUS at 02:23

## 2018-01-21 RX ADMIN — INSULIN HUMAN 2 UNITS: 100 INJECTION, SOLUTION PARENTERAL at 11:23

## 2018-01-21 RX ADMIN — LINEZOLID 600 MG: 600 TABLET, FILM COATED ORAL at 08:34

## 2018-01-21 RX ADMIN — ASPIRIN 81 MG: 81 TABLET, COATED ORAL at 08:34

## 2018-01-21 RX ADMIN — DIPHENHYDRAMINE HYDROCHLORIDE 25 MG: 50 INJECTION, SOLUTION INTRAMUSCULAR; INTRAVENOUS at 18:28

## 2018-01-21 RX ADMIN — OXYCODONE HYDROCHLORIDE 5 MG: 5 TABLET ORAL at 21:28

## 2018-01-21 RX ADMIN — OXYCODONE HYDROCHLORIDE 5 MG: 5 TABLET ORAL at 15:21

## 2018-01-21 RX ADMIN — LINEZOLID 600 MG: 600 TABLET, FILM COATED ORAL at 21:17

## 2018-01-21 RX ADMIN — CARVEDILOL 3.12 MG: 3.12 TABLET, FILM COATED ORAL at 08:40

## 2018-01-21 RX ADMIN — DIPHENHYDRAMINE HYDROCHLORIDE 25 MG: 50 INJECTION, SOLUTION INTRAMUSCULAR; INTRAVENOUS at 22:02

## 2018-01-21 RX ADMIN — AMPICILLIN SODIUM AND SULBACTAM SODIUM: 100; 50 INJECTION, POWDER, FOR SOLUTION INTRAVENOUS at 22:04

## 2018-01-21 RX ADMIN — OXYCODONE HYDROCHLORIDE 5 MG: 5 TABLET ORAL at 06:24

## 2018-01-21 RX ADMIN — INSULIN HUMAN 2 UNITS: 100 INJECTION, SOLUTION PARENTERAL at 16:08

## 2018-01-21 RX ADMIN — CELECOXIB 200 MG: 200 CAPSULE ORAL at 08:34

## 2018-01-21 RX ADMIN — CARVEDILOL 3.12 MG: 3.12 TABLET, FILM COATED ORAL at 16:48

## 2018-01-21 RX ADMIN — FAMOTIDINE 20 MG: 20 TABLET, FILM COATED ORAL at 21:17

## 2018-01-21 RX ADMIN — INSULIN HUMAN 2 UNITS: 100 INJECTION, SOLUTION PARENTERAL at 21:23

## 2018-01-21 RX ADMIN — PRAVASTATIN SODIUM 20 MG: 10 TABLET ORAL at 21:17

## 2018-01-21 ASSESSMENT — PAIN SCALES - GENERAL
PAINLEVEL_OUTOF10: 2
PAINLEVEL_OUTOF10: 7
PAINLEVEL_OUTOF10: 8
PAINLEVEL_OUTOF10: 8

## 2018-01-21 ASSESSMENT — ENCOUNTER SYMPTOMS
MYALGIAS: 1
HEADACHES: 0
FALLS: 0
PSYCHIATRIC NEGATIVE: 1
CHILLS: 0
ABDOMINAL PAIN: 0
NAUSEA: 0
PALPITATIONS: 0
DIARRHEA: 0
VOMITING: 0
SHORTNESS OF BREATH: 0
WEAKNESS: 1
FEVER: 0

## 2018-01-21 ASSESSMENT — PATIENT HEALTH QUESTIONNAIRE - PHQ9
6. FEELING BAD ABOUT YOURSELF - OR THAT YOU ARE A FAILURE OR HAVE LET YOURSELF OR YOUR FAMILY DOWN: SEVERAL DAYS
8. MOVING OR SPEAKING SO SLOWLY THAT OTHER PEOPLE COULD HAVE NOTICED. OR THE OPPOSITE, BEING SO FIGETY OR RESTLESS THAT YOU HAVE BEEN MOVING AROUND A LOT MORE THAN USUAL: NOT AT ALL
5. POOR APPETITE OR OVEREATING: NOT AT ALL
4. FEELING TIRED OR HAVING LITTLE ENERGY: SEVERAL DAYS
3. TROUBLE FALLING OR STAYING ASLEEP OR SLEEPING TOO MUCH: SEVERAL DAYS
1. LITTLE INTEREST OR PLEASURE IN DOING THINGS: SEVERAL DAYS
7. TROUBLE CONCENTRATING ON THINGS, SUCH AS READING THE NEWSPAPER OR WATCHING TELEVISION: NOT AT ALL
2. FEELING DOWN, DEPRESSED, IRRITABLE, OR HOPELESS: SEVERAL DAYS
9. THOUGHTS THAT YOU WOULD BE BETTER OFF DEAD, OR OF HURTING YOURSELF: SEVERAL DAYS
SUM OF ALL RESPONSES TO PHQ9 QUESTIONS 1 AND 2: 2
SUM OF ALL RESPONSES TO PHQ QUESTIONS 1-9: 6

## 2018-01-21 ASSESSMENT — LIFESTYLE VARIABLES: DO YOU DRINK ALCOHOL: NO

## 2018-01-21 NOTE — PROGRESS NOTES
[facility] Hospitalist Progress Note    Date of Service: 2018    Chief Complaint  52 y.o. male admitted 1/15/2018 with toe pain and drainage from recent amputation.    Interval Problem Update  Discussed home infusions, states that he will not be able to do them himself. Increased pain when boot taken off. No acute overnight events.     Consultants/Specialty  ID  Surgery  DM educators    Disposition  Pending medical clearance.  SNF vs [facility] (transport needed)        Review of Systems   Constitutional: Positive for malaise/fatigue. Negative for chills and fever.   Respiratory: Negative for shortness of breath.    Cardiovascular: Negative for chest pain, palpitations and leg swelling.   Gastrointestinal: Negative for abdominal pain, nausea and vomiting.   Genitourinary: Negative for dysuria.   Musculoskeletal: Positive for joint pain and myalgias. Negative for falls.   Skin: Positive for rash.        Foot bandaged and wrapped with and ACE. C/D/I   Neurological: Negative for weakness and headaches.   Psychiatric/Behavioral: Negative.    All other systems reviewed and are negative.     Physical Exam  Laboratory/Imaging   Hemodynamics  Temp (24hrs), Av.3 °C (97.4 °F), Min:36.1 °C (97 °F), Max:36.7 °C (98 °F)   Temperature: 36.7 °C (98 °F)  Pulse  Av.8  Min: 61  Max: 96    Blood Pressure: 149/60      Respiratory      Respiration: 16, Pulse Oximetry: 96 %     Work Of Breathing / Effort: Mild  RUL Breath Sounds: Clear, RML Breath Sounds: Clear, RLL Breath Sounds: Clear, ELIAS Breath Sounds: Clear, LLL Breath Sounds: Clear    Fluids    Intake/Output Summary (Last 24 hours) at 18 2221  Last data filed at 18 1800   Gross per 24 hour   Intake              100 ml   Output                0 ml   Net              100 ml       Nutrition  Orders Placed This Encounter   Procedures   • DIET ORDER     Standing Status:   Standing     Number of Occurrences:   1     Order Specific Question:   Diet:     Answer:   Diabetic  [3]     Physical Exam   Constitutional: He is oriented to person, place, and time. He appears well-developed. No distress.   HENT:   Mouth/Throat: Oropharynx is clear and moist.   Eyes: EOM are normal. Pupils are equal, round, and reactive to light. No scleral icterus.   Neck: Normal range of motion. Neck supple.   Cardiovascular: Normal rate and regular rhythm.    Pulmonary/Chest: Effort normal. No respiratory distress.   Abdominal: Soft. He exhibits no distension. There is no tenderness.   Musculoskeletal: He exhibits edema.   Lymphadenopathy:     He has no cervical adenopathy.   Neurological: He is alert and oriented to person, place, and time.   Skin: Skin is warm. There is erythema.   Examined with PLS; stitches in place with significant swelling and erythema. Serous drainage.   Psychiatric: He has a normal mood and affect. His speech is normal.   Vitals reviewed.      Recent Labs      01/18/18   0338  01/20/18   0359   WBC  6.9  6.6   RBC  4.44*  4.30*   HEMOGLOBIN  12.2*  12.0*   HEMATOCRIT  36.5*  35.6*   MCV  82.2  82.8   MCH  27.5  27.9   MCHC  33.4*  33.7   RDW  42.4  44.2   PLATELETCT  173  201   MPV  9.8  10.1     Recent Labs      01/20/18   0359   SODIUM  137   POTASSIUM  3.7   CHLORIDE  103   CO2  26   GLUCOSE  136*   BUN  19   CREATININE  0.79   CALCIUM  8.5                      Assessment/Plan     Lower limb amputation, great toe (CMS-[facility])- (present on admission)   Assessment & Plan    R great toe amputation with Dr Leo on 11/10/2017 s/p 6 weeks abx. Subsequent infection / OM. S/p PICC line on 1/19.  - ID and surgery following, greatly appreciate  - s/p surgery 1/17  - continue zyvox, resume unasyn  - LPS following, greatly appreciate  - pain control    MRI foot:  First metatarsal head edema and enhancement is compatible with osteomyelitis, seen at the deep margin of deep skin ulceration at the amputation stump  Great toe amputation  Cellulitis, granulation tissue, phlegmonous change. No  abscess  Medial first metatarsal head sesamoid edema and enhancement could be reactive or from osteomyelitis        Type 2 diabetes mellitus (CMS-HCC)- (present on admission)   Assessment & Plan    Uncontrolled with hyperglycemia. A1C 9.2%. FS have remained < 200.  - Holding oral hypoglycemic agents  - SSI and hypoglycemia protocol  - goal FS< 200 for better wound healing        HLD (hyperlipidemia)- (present on admission)   Assessment & Plan    Pravastatin        GERD (gastroesophageal reflux disease)- (present on admission)   Assessment & Plan    Stable.  - Pepcid        Hypertension- (present on admission)   Assessment & Plan    Normotensive.  - Continue carvedilol, lisinopril and amlodipine        Obesity- (present on admission)   Assessment & Plan    Body mass index is 39.6 kg/m².            Reviewed items::  Medications reviewed and Labs reviewed  Zabala catheter::  No Zabala  DVT: holding heparin for picc line.  DVT prophylaxis - mechanical:  SCDs  Ulcer Prophylaxis::  Yes

## 2018-01-21 NOTE — PROGRESS NOTES
Had difficulty with pt's PICC all night. It was very slow and hard to flush with no blood return. After flushing several times and doing several positions, PICC started flushing with blood return. About 4 hours later of continuous abx infusing, PICC was not flushing again. Alteplase administered with no success. PIV inserted on right arm to have continuous abx running. Second dose of alteplase administered. Blood return present as soon as negative pressure was applied just before 2nd dose of alteplase given. Alteplase was wasted and PICC line connected again to the continuous abx. Not sure whether the first dose of alteplase worked or because patient turned to his right side.    Dressing to right foot clean, dry, and intact. RLE elevated. Pain control with oxy 5. No other concerns at this time.

## 2018-01-21 NOTE — CARE PLAN
"Problem: Communication  Goal: The ability to communicate needs accurately and effectively will improve    Intervention: Educate patient and significant other/support system about the plan of care, procedures, treatments, medications and allow for questions  Administered coreg this morning, held lisinopril and amlodipine, patient borderline hypotensive this morning, patient experienced orthostatic hypotension while sitting up, patient stated \"I feel dizzy\", patient laid back down onto bed, systolic blood pressure in 60's, retook twice. Will monitor vital signs every 4 hours.       Problem: Skin Integrity  Goal: Risk for impaired skin integrity will decrease    Intervention: Assess and monitor skin integrity, appearance and/or temperature  Received report from ELISA Scott RN reported small dehiscence noted to right foot post surgery, clarified with ASHOK Gomes, care to be determined by LPS.         "

## 2018-01-21 NOTE — PROGRESS NOTES
"Infectious Disease Progress Note    Author: Cristy Holley M.D. Date & Time of service: 2018  5:02 PM    Chief Complaint:  diabetic foot ulcer at surgical site      Interval History:  52 y.o. diabetic male admitted 1/15/2018 pain/open wound at the site of right great toe amputation   AF WBC 8.5 seen with LPS Tolerating abx well   Af tolerating abx well-plan for OR today   AF tolerated surgery well-had a lot of nausea with morphine   AF no new complaints awaiting PICC   AF got PICC was told during last dressing change his wound looked \"angry\"  Labs Reviewed, Medications Reviewed, Radiology Reviewed and Wound Reviewed.    Review of Systems:  Review of Systems   Constitutional: Negative for chills and fever.   Gastrointestinal: Negative for abdominal pain, diarrhea and vomiting.   Musculoskeletal: Positive for joint pain.   All other systems reviewed and are negative.      Hemodynamics:  Temp (24hrs), Av.6 °C (97.8 °F), Min:36.1 °C (97 °F), Max:37.1 °C (98.7 °F)  Temperature: 36.7 °C (98 °F)  Pulse  Av.8  Min: 61  Max: 96   Blood Pressure: 149/60       Physical Exam:  Physical Exam   Constitutional: He is oriented to person, place, and time. He appears well-developed. No distress.   Obese   HENT:   Head: Normocephalic and atraumatic.   Eyes: EOM are normal. Pupils are equal, round, and reactive to light.   Neck: Neck supple.   Cardiovascular: Normal rate.    Pulmonary/Chest: Effort normal and breath sounds normal. No respiratory distress.   Abdominal: Soft. He exhibits no distension. There is no tenderness.   Musculoskeletal: He exhibits edema and tenderness.   Foot -surgical site well-approx with sutures but increased surrounding erythema and edema   Neurological: He is alert and oriented to person, place, and time.   Skin: There is erythema.   Nursing note and vitals reviewed.      Meds:    Current Facility-Administered Medications:   •  amlodipine  •  aspirin  •  carvedilol  •  " celecoxib  •  famotidine  •  lisinopril  •  pravastatin  •  senna-docusate **AND** polyethylene glycol/lytes **AND** magnesium hydroxide **AND** bisacodyl  •  Respiratory Care per Protocol  •  acetaminophen  •  Notify provider if pain remains uncontrolled **AND** Use the numeric rating scale (NRS-11) on regular floors and Critical-Care Pain Observation Tool (CPOT) on ICUs/Trauma to assess pain **AND** Pulse Ox (Oximetry) **AND** Pharmacy Consult Request **AND** If patient difficult to arouse and/or has respiratory depression, stop any opiates that are currently infusing and call a Rapid Response. **AND** oxycodone immediate-release **AND** oxycodone immediate-release **AND** morphine injection  •  insulin regular **AND** Accu-Chek ACHS **AND** NOTIFY MD and PharmD **AND** glucose 4 g **AND** dextrose 50%  •  ondansetron  •  ondansetron  •  linezolid    Labs:  Recent Labs      01/18/18   0338  01/20/18   0359   WBC  6.9  6.6   RBC  4.44*  4.30*   HEMOGLOBIN  12.2*  12.0*   HEMATOCRIT  36.5*  35.6*   MCV  82.2  82.8   MCH  27.5  27.9   RDW  42.4  44.2   PLATELETCT  173  201   MPV  9.8  10.1     Recent Labs      01/20/18   0359   SODIUM  137   POTASSIUM  3.7   CHLORIDE  103   CO2  26   GLUCOSE  136*   BUN  19     Recent Labs      01/20/18   0359   CREATININE  0.79       Imaging:  Dx-foot-complete 3+ Right    Result Date: 1/15/2018  1/15/2018 1:07 PM HISTORY/REASON FOR EXAM:  Atraumatic Pain/Swelling/Deformity Right foot pain s/p Great toe amputation. Pt had a recent toe amputation on the right foot. Home health nurse checked his foot this morning and sts his open wound is full thickness and needed to come in TECHNIQUE/EXAM DESCRIPTION AND NUMBER OF VIEWS: 3 views of the RIGHT foot. COMPARISON:  11/10/2017 FINDINGS: Prior amputation of great toe at the level of the metatarsophalangeal joint. Focal soft tissue swelling over the distal 1st metatarsal.  Lucency within soft tissues consistent with ulceration.  No soft  tissue gas or radiopaque foreign body.  No focal bony destruction. Degenerative change of the midfoot. Calcaneal enthesophytes present. Forefoot and ankle soft tissue swelling.     1.  Prior great toe amputation. 2.  RIGHT forefoot soft tissue swelling with ulceration overlying the distal 1st metatarsal head. 3.  Forefoot and ankle soft tissue swelling. 4.  No gross bone destruction, however osteomyelitis is not excluded by plain film alone.      Micro:  Results     Procedure Component Value Units Date/Time    AFB CULTURE [[id number]] Collected:  01/17/18 4552    Order Status:  Completed Specimen:  Bone Updated:  01/20/18 1531     Significant Indicator NEG     Source BONE     Site Right Foot Sesamoid     AFB Culture Culture in progress.     AFB Smear Results No acid fast bacilli seen.    Narrative:       CALL  [place]  131 tel. [phone],    CULTURE TISSUE W/ GRM STAIN [[id number]]  (Abnormal)  (Susceptibility) Collected:  01/17/18 0512    Order Status:  Completed Specimen:  Bone Updated:  01/20/18 6043     Significant Indicator POS (POS)     Source BONE     Site Right Foot Sesamoid     Tissue Culture -- (A)     Gram Stain Result No organisms seen.     Tissue Culture -- (A)     Enterococcus faecalis  Moderate growth  Combination therapy with ampicillin, penicillin, or  vancomycin (for susceptible strains) plus an aminoglycoside  is usually indicated for serious enterococcal infections,  such as endocarditis unless high-level resistance to both  gentamicin and streptomycin is documented; such combinations  are predicted to result in synergistic killing of the  Enterococcus.  The susceptibility profile for this organism indicates that  Streptomycin would not be an effective component of  combination therapy.  The susceptibility profile for this organism indicates that  Gentamycin would not be an effective component of combination  therapy.       Tissue Culture -- (A)     Methicillin Resistant Staphylococcus  aureus  Moderate growth  This isolate is presumed to be clindamycin resistant based on  detection of inducible resistance.  Clindamycin may still  be effective in some patients.      Narrative:       CALL  [place]  131 tel. [phone],    Culture & Susceptibility     ENTEROCOCCUS FAECALIS     Antibiotic Sensitivity Microscan Unit Status    Ampicillin Sensitive <=2 mcg/mL Final    Daptomycin Sensitive 2 mcg/mL Final    Gent Synergy Resistant >500 mcg/mL Final    Penicillin Sensitive 8 mcg/mL Final    Strep Synergy Resistant >1000 mcg/mL Final    Vancomycin Sensitive 2 mcg/mL Final              METHICILLIN RESISTANT STAPHYLOCOCCUS AUREUS     Antibiotic Sensitivity Microscan Unit Status    Ampicillin/sulbactam Resistant 16/8 mcg/mL Final    Clindamycin Resistant <=0.5 mcg/mL Final    Daptomycin Sensitive 1 mcg/mL Final    Erythromycin Resistant >4 mcg/mL Final    Moxifloxacin Resistant >4 mcg/mL Final    Oxacillin Resistant >2 mcg/mL Final    Penicillin Resistant >8 mcg/mL Final    Tetracycline Sensitive <=4 mcg/mL Final    Trimeth/Sulfa Sensitive <=0.5/9.5 mcg/mL Final    Vancomycin Sensitive 2 mcg/mL Final                       ANAEROBIC CULTURE [[id number]] Collected:  01/17/18 1355    Order Status:  Completed Specimen:  Bone Updated:  01/20/18 1534     Significant Indicator NEG     Source BONE     Site Right Foot Sesamoid     Anaerobic Culture, Culture Res No Anaerobes isolated.    Narrative:       CALL  [place]  131 tel. [phone],    FUNGAL CULTURE [[id number]] Collected:  01/17/18 1355    Order Status:  Completed Specimen:  Bone Updated:  01/20/18 1534     Significant Indicator NEG     Source BONE     Site Right Foot Sesamoid     Fungal Culture Culture in progress.    Narrative:       CALL  [place]  131 tel. [phone],    GRAM STAIN [[id number]] Collected:  01/17/18 1355    Order Status:  Completed Specimen:  Bone Updated:  01/18/18 1639     Significant Indicator .     Source BONE     Site Right Foot Sesamoid     Gram  Stain Result No organisms seen.    ACID FAST STAIN [[id number]] Collected:  01/17/18 1355    Order Status:  Completed Specimen:  Bone Updated:  01/18/18 1639     Significant Indicator NEG     Source BONE     Site Right Foot Sesamoid     AFB Smear Results No acid fast bacilli seen.    CULTURE WOUND W/ GRAM STAIN [[id number]]  (Abnormal)  (Susceptibility) Collected:  01/15/18 1216    Order Status:  Completed Specimen:  Wound from Right Foot Updated:  01/17/18 1609     Gram Stain Result --     Moderate WBCs.  Many Gram positive cocci.  Many Gram positive rods.       Significant Indicator POS (POS)     Source WND     Site RIGHT FOOT     Culture Result Wound -- (A)     Culture Result Wound -- (A)     Methicillin Resistant Staphylococcus aureus  Heavy growth  This isolate is presumed to be clindamycin resistant based on  detection of inducible resistance.  Clindamycin may still  be effective in some patients.       Culture Result Wound -- (A)     Enterococcus faecalis  Heavy growth  Combination therapy with ampicillin, penicillin, or  vancomycin (for susceptible strains) plus an aminoglycoside  is usually indicated for serious enterococcal infections,  such as endocarditis unless high-level resistance to both  gentamicin and streptomycin is documented; such combinations  are predicted to result in synergistic killing of the  Enterococcus.  The susceptibility profile for this organism indicates that  Streptomycin would not be an effective component of  combination therapy.  The susceptibility profile for this organism indicates that  Gentamycin would not be an effective component of combination  therapy.      Narrative:       CALL  [place]  131 tel. [phone],  CALLED  131 tel. [phone] 01/17/2018, 16:09, RB PERF. RESULTS CALLED  TO:[phone];fax to inf ctl    Culture & Susceptibility     ENTEROCOCCUS FAECALIS     Antibiotic Sensitivity Microscan Unit Status    Ampicillin Sensitive <=2 mcg/mL Final    Daptomycin Sensitive 2  mcg/mL Final    Gent Synergy Resistant >500 mcg/mL Final    Penicillin Sensitive 8 mcg/mL Final    Strep Synergy Resistant >1000 mcg/mL Final    Vancomycin Sensitive 2 mcg/mL Final              METHICILLIN RESISTANT STAPHYLOCOCCUS AUREUS     Antibiotic Sensitivity Microscan Unit Status    Ampicillin/sulbactam Resistant 16/8 mcg/mL Final    Clindamycin Resistant <=0.5 mcg/mL Final    Daptomycin Sensitive <=0.5 mcg/mL Final    Erythromycin Resistant >4 mcg/mL Final    Moxifloxacin Resistant >4 mcg/mL Final    Oxacillin Resistant >2 mcg/mL Final    Penicillin Resistant >8 mcg/mL Final    Tetracycline Sensitive <=4 mcg/mL Final    Trimeth/Sulfa Sensitive <=0.5/9.5 mcg/mL Final    Vancomycin Sensitive 2 mcg/mL Final                       GRAM STAIN [[id number]] Collected:  01/15/18 1216    Order Status:  Completed Specimen:  Wound Updated:  01/15/18 1821     Significant Indicator .     Source WND     Site RIGHT FOOT     Gram Stain Result --     Moderate WBCs.  Many Gram positive cocci.  Many Gram positive rods.            Assessment:  Active Hospital Problems    Diagnosis   • Lower limb amputation, great toe (CMS-HCC) [Z89.419]   • GERD (gastroesophageal reflux disease) [K21.9]   • HLD (hyperlipidemia) [E78.5]   • Hypertension [I10]   • Type 2 diabetes mellitus (CMS-HCC) [E11.9]   • Obesity [E66.9]       Plan:  Diabetic foot ulcer at prior amputation site, right great toe , not improving  Afebrile  No leukocytosis  MRI+ OM-clinical OM  SHAYAN OK in 11/2017  s/p ray amp 1/17  Current wound cxs with MRSA and enterococcus  Anticipate 6 weeks abx post-op-continue Zyvox while in hosp  Dapto as outpatient  May need additional drainage     PCN allergy   Tolerated Unasyn previously  Change to Unasyn  Continue Zyvox    Type 2 diabetes mellitus    Keep BS under 150 to help control current infection  Hgb A1C in 11/2017 =9.6

## 2018-01-21 NOTE — PROGRESS NOTES
Called pharmacy and spoke to patient about his penicillin allergy.  Patient had received multiple doses of ampicillin/sulbactam in November, 2017 without a reaction.  Okay to give now.  Educated patient to call right away for any suspected reaction.  Received verbal understanding.

## 2018-01-21 NOTE — PROGRESS NOTES
"Infectious Disease Progress Note    Author: Cristy Holley M.D. Date & Time of service: 2018  2:37 PM    Chief Complaint:  diabetic foot ulcer at surgical site      Interval History:  52 y.o. diabetic male admitted 1/15/2018 pain/open wound at the site of right great toe amputation   AF WBC 8.5 seen with LPS Tolerating abx well   Af tolerating abx well-plan for OR today   AF tolerated surgery well-had a lot of nausea with morphine   AF no new complaints awaiting PICC   AF got PICC was told during last dressing change his wound looked \"angry\"   AF problems with PICC noted-currently working  Labs Reviewed, Medications Reviewed, Radiology Reviewed and Wound Reviewed.    Review of Systems:  Review of Systems   Constitutional: Negative for chills and fever.   Gastrointestinal: Negative for abdominal pain, diarrhea and vomiting.   Musculoskeletal: Positive for joint pain.   All other systems reviewed and are negative.      Hemodynamics:  Temp (24hrs), Av.7 °C (98 °F), Min:36.5 °C (97.7 °F), Max:36.7 °C (98.1 °F)  Temperature: 36.7 °C (98.1 °F)  Pulse  Av.1  Min: 61  Max: 104   Blood Pressure: 118/70       Physical Exam:  Physical Exam   Constitutional: He is oriented to person, place, and time. He appears well-developed. No distress.   Obese   HENT:   Head: Normocephalic and atraumatic.   Eyes: EOM are normal. Pupils are equal, round, and reactive to light.   Neck: Neck supple.   Cardiovascular: Normal rate.    Pulmonary/Chest: Effort normal and breath sounds normal. No respiratory distress.   Abdominal: Soft. He exhibits no distension. There is no tenderness.   Musculoskeletal: He exhibits edema and tenderness.   Foot -surgical site well-approx with sutures but increased surrounding erythema and edema   Neurological: He is alert and oriented to person, place, and time.   Skin: There is erythema.   Nursing note and vitals reviewed.      Meds:    Current Facility-Administered " Medications:   •  [COMPLETED] ampicillin-sulbactam (UNASYN) IV **AND** ampicillin-sulbactam (UNASYN) continuous infusion  •  amlodipine  •  aspirin  •  carvedilol  •  celecoxib  •  famotidine  •  lisinopril  •  pravastatin  •  senna-docusate **AND** polyethylene glycol/lytes **AND** magnesium hydroxide **AND** bisacodyl  •  Respiratory Care per Protocol  •  acetaminophen  •  Notify provider if pain remains uncontrolled **AND** Use the numeric rating scale (NRS-11) on regular floors and Critical-Care Pain Observation Tool (CPOT) on ICUs/Trauma to assess pain **AND** Pulse Ox (Oximetry) **AND** Pharmacy Consult Request **AND** If patient difficult to arouse and/or has respiratory depression, stop any opiates that are currently infusing and call a Rapid Response. **AND** oxycodone immediate-release **AND** oxycodone immediate-release **AND** morphine injection  •  insulin regular **AND** Accu-Chek ACHS **AND** NOTIFY MD and PharmD **AND** glucose 4 g **AND** dextrose 50%  •  ondansetron  •  ondansetron  •  linezolid    Labs:  Recent Labs      01/20/18   0359   WBC  6.6   RBC  4.30*   HEMOGLOBIN  12.0*   HEMATOCRIT  35.6*   MCV  82.8   MCH  27.9   RDW  44.2   PLATELETCT  201   MPV  10.1     Recent Labs      01/20/18   0359   SODIUM  137   POTASSIUM  3.7   CHLORIDE  103   CO2  26   GLUCOSE  136*   BUN  19     Recent Labs      01/20/18   0359   CREATININE  0.79       Imaging:  Dx-foot-complete 3+ Right    Result Date: 1/15/2018  1/15/2018 1:07 PM HISTORY/REASON FOR EXAM:  Atraumatic Pain/Swelling/Deformity Right foot pain s/p Great toe amputation. Pt had a recent toe amputation on the right foot. Home health nurse checked his foot this morning and sts his open wound is full thickness and needed to come in TECHNIQUE/EXAM DESCRIPTION AND NUMBER OF VIEWS: 3 views of the RIGHT foot. COMPARISON:  11/10/2017 FINDINGS: Prior amputation of great toe at the level of the metatarsophalangeal joint. Focal soft tissue swelling over  the distal 1st metatarsal.  Lucency within soft tissues consistent with ulceration.  No soft tissue gas or radiopaque foreign body.  No focal bony destruction. Degenerative change of the midfoot. Calcaneal enthesophytes present. Forefoot and ankle soft tissue swelling.     1.  Prior great toe amputation. 2.  RIGHT forefoot soft tissue swelling with ulceration overlying the distal 1st metatarsal head. 3.  Forefoot and ankle soft tissue swelling. 4.  No gross bone destruction, however osteomyelitis is not excluded by plain film alone.      Micro:  Results     Procedure Component Value Units Date/Time    AFB CULTURE [[id number]] Collected:  01/17/18 4413    Order Status:  Completed Specimen:  Bone Updated:  01/20/18 4734     Significant Indicator NEG     Source BONE     Site Right Foot Sesamoid     AFB Culture Culture in progress.     AFB Smear Results No acid fast bacilli seen.    Narrative:       CALL  [place]  131 tel. [phone],    CULTURE TISSUE W/ GRM STAIN [[id number]]  (Abnormal)  (Susceptibility) Collected:  01/17/18 4114    Order Status:  Completed Specimen:  Bone Updated:  01/20/18 1185     Significant Indicator POS (POS)     Source BONE     Site Right Foot Sesamoid     Tissue Culture -- (A)     Gram Stain Result No organisms seen.     Tissue Culture -- (A)     Enterococcus faecalis  Moderate growth  Combination therapy with ampicillin, penicillin, or  vancomycin (for susceptible strains) plus an aminoglycoside  is usually indicated for serious enterococcal infections,  such as endocarditis unless high-level resistance to both  gentamicin and streptomycin is documented; such combinations  are predicted to result in synergistic killing of the  Enterococcus.  The susceptibility profile for this organism indicates that  Streptomycin would not be an effective component of  combination therapy.  The susceptibility profile for this organism indicates that  Gentamycin would not be an effective component of  combination  therapy.       Tissue Culture -- (A)     Methicillin Resistant Staphylococcus aureus  Moderate growth  This isolate is presumed to be clindamycin resistant based on  detection of inducible resistance.  Clindamycin may still  be effective in some patients.      Narrative:       CALL  [place]  131 tel. [phone],    Culture & Susceptibility     ENTEROCOCCUS FAECALIS     Antibiotic Sensitivity Microscan Unit Status    Ampicillin Sensitive <=2 mcg/mL Final    Daptomycin Sensitive 2 mcg/mL Final    Gent Synergy Resistant >500 mcg/mL Final    Penicillin Sensitive 8 mcg/mL Final    Strep Synergy Resistant >1000 mcg/mL Final    Vancomycin Sensitive 2 mcg/mL Final              METHICILLIN RESISTANT STAPHYLOCOCCUS AUREUS     Antibiotic Sensitivity Microscan Unit Status    Ampicillin/sulbactam Resistant 16/8 mcg/mL Final    Clindamycin Resistant <=0.5 mcg/mL Final    Daptomycin Sensitive 1 mcg/mL Final    Erythromycin Resistant >4 mcg/mL Final    Moxifloxacin Resistant >4 mcg/mL Final    Oxacillin Resistant >2 mcg/mL Final    Penicillin Resistant >8 mcg/mL Final    Tetracycline Sensitive <=4 mcg/mL Final    Trimeth/Sulfa Sensitive <=0.5/9.5 mcg/mL Final    Vancomycin Sensitive 2 mcg/mL Final                       ANAEROBIC CULTURE [[id number]] Collected:  01/17/18 1355    Order Status:  Completed Specimen:  Bone Updated:  01/20/18 1534     Significant Indicator NEG     Source BONE     Site Right Foot Sesamoid     Anaerobic Culture, Culture Res No Anaerobes isolated.    Narrative:       CALL  [place]  131 tel. [phone],    FUNGAL CULTURE [[id number]] Collected:  01/17/18 1355    Order Status:  Completed Specimen:  Bone Updated:  01/20/18 1534     Significant Indicator NEG     Source BONE     Site Right Foot Sesamoid     Fungal Culture Culture in progress.    Narrative:       CALL  [place]  131 tel. [phone],    GRAM STAIN [[id number]] Collected:  01/17/18 1355    Order Status:  Completed Specimen:  Bone Updated:  01/18/18  1639     Significant Indicator .     Source BONE     Site Right Foot Sesamoid     Gram Stain Result No organisms seen.    ACID FAST STAIN [[id number]] Collected:  01/17/18 1355    Order Status:  Completed Specimen:  Bone Updated:  01/18/18 1639     Significant Indicator NEG     Source BONE     Site Right Foot Sesamoid     AFB Smear Results No acid fast bacilli seen.    CULTURE WOUND W/ GRAM STAIN [[id number]]  (Abnormal)  (Susceptibility) Collected:  01/15/18 1216    Order Status:  Completed Specimen:  Wound from Right Foot Updated:  01/17/18 1609     Gram Stain Result --     Moderate WBCs.  Many Gram positive cocci.  Many Gram positive rods.       Significant Indicator POS (POS)     Source WND     Site RIGHT FOOT     Culture Result Wound -- (A)     Culture Result Wound -- (A)     Methicillin Resistant Staphylococcus aureus  Heavy growth  This isolate is presumed to be clindamycin resistant based on  detection of inducible resistance.  Clindamycin may still  be effective in some patients.       Culture Result Wound -- (A)     Enterococcus faecalis  Heavy growth  Combination therapy with ampicillin, penicillin, or  vancomycin (for susceptible strains) plus an aminoglycoside  is usually indicated for serious enterococcal infections,  such as endocarditis unless high-level resistance to both  gentamicin and streptomycin is documented; such combinations  are predicted to result in synergistic killing of the  Enterococcus.  The susceptibility profile for this organism indicates that  Streptomycin would not be an effective component of  combination therapy.  The susceptibility profile for this organism indicates that  Gentamycin would not be an effective component of combination  therapy.      Narrative:       CALL  [place]  131 tel. [phone],  CALLED  131 tel. [phone] 01/17/2018, 16:09, RB PERF. RESULTS CALLED  TO:[id number],RN;fax to inf ctl    Culture & Susceptibility     ENTEROCOCCUS FAECALIS     Antibiotic Sensitivity  Microscan Unit Status    Ampicillin Sensitive <=2 mcg/mL Final    Daptomycin Sensitive 2 mcg/mL Final    Gent Synergy Resistant >500 mcg/mL Final    Penicillin Sensitive 8 mcg/mL Final    Strep Synergy Resistant >1000 mcg/mL Final    Vancomycin Sensitive 2 mcg/mL Final              METHICILLIN RESISTANT STAPHYLOCOCCUS AUREUS     Antibiotic Sensitivity Microscan Unit Status    Ampicillin/sulbactam Resistant 16/8 mcg/mL Final    Clindamycin Resistant <=0.5 mcg/mL Final    Daptomycin Sensitive <=0.5 mcg/mL Final    Erythromycin Resistant >4 mcg/mL Final    Moxifloxacin Resistant >4 mcg/mL Final    Oxacillin Resistant >2 mcg/mL Final    Penicillin Resistant >8 mcg/mL Final    Tetracycline Sensitive <=4 mcg/mL Final    Trimeth/Sulfa Sensitive <=0.5/9.5 mcg/mL Final    Vancomycin Sensitive 2 mcg/mL Final                       GRAM STAIN [[id number]] Collected:  01/15/18 1216    Order Status:  Completed Specimen:  Wound Updated:  01/15/18 1327     Significant Indicator .     Source WND     Site RIGHT FOOT     Gram Stain Result --     Moderate WBCs.  Many Gram positive cocci.  Many Gram positive rods.            Assessment:  Active Hospital Problems    Diagnosis   • Lower limb amputation, great toe (CMS-HCC) [Z89.419]   • GERD (gastroesophageal reflux disease) [K21.9]   • HLD (hyperlipidemia) [E78.5]   • Hypertension [I10]   • Type 2 diabetes mellitus (CMS-HCC) [E11.9]   • Obesity [E66.9]       Plan:  Diabetic foot ulcer at prior amputation site, right great toe , worse  Afebrile  No leukocytosis  MRI+ OM-clinical OM  SHAYAN OK in 11/2017  s/p ray amp 1/17  Current wound cxs with MRSA and enterococcus  Anticipate 6 weeks abx post-op-continue Zyvox while in hosp  Dapto as outpatient  May need additional drainage     PCN allergy   Tolerated Unasyn previously-continue  Continue Zyvox    Type 2 diabetes mellitus    Keep BS under 150 to help control current infection  Hgb A1C in 11/2017 =9.6

## 2018-01-22 LAB
ANION GAP SERPL CALC-SCNC: 8 MMOL/L (ref 0–11.9)
BUN SERPL-MCNC: 16 MG/DL (ref 8–22)
CALCIUM SERPL-MCNC: 8.3 MG/DL (ref 8.5–10.5)
CHLORIDE SERPL-SCNC: 104 MMOL/L (ref 96–112)
CO2 SERPL-SCNC: 24 MMOL/L (ref 20–33)
CREAT SERPL-MCNC: 0.66 MG/DL (ref 0.5–1.4)
ERYTHROCYTE [DISTWIDTH] IN BLOOD BY AUTOMATED COUNT: 44.6 FL (ref 35.9–50)
GLUCOSE BLD-MCNC: 138 MG/DL (ref 65–99)
GLUCOSE BLD-MCNC: 141 MG/DL (ref 65–99)
GLUCOSE BLD-MCNC: 144 MG/DL (ref 65–99)
GLUCOSE SERPL-MCNC: 136 MG/DL (ref 65–99)
HCT VFR BLD AUTO: 40.3 % (ref 42–52)
HGB BLD-MCNC: 13.8 G/DL (ref 14–18)
MCH RBC QN AUTO: 28.2 PG (ref 27–33)
MCHC RBC AUTO-ENTMCNC: 34.2 G/DL (ref 33.7–35.3)
MCV RBC AUTO: 82.4 FL (ref 81.4–97.8)
PLATELET # BLD AUTO: 241 K/UL (ref 164–446)
PMV BLD AUTO: 10.2 FL (ref 9–12.9)
POTASSIUM SERPL-SCNC: 3.6 MMOL/L (ref 3.6–5.5)
RBC # BLD AUTO: 4.89 M/UL (ref 4.7–6.1)
SODIUM SERPL-SCNC: 136 MMOL/L (ref 135–145)
WBC # BLD AUTO: 5 K/UL (ref 4.8–10.8)

## 2018-01-22 PROCEDURE — 85027 COMPLETE CBC AUTOMATED: CPT

## 2018-01-22 PROCEDURE — 700102 HCHG RX REV CODE 250 W/ 637 OVERRIDE(OP): Performed by: INTERNAL MEDICINE

## 2018-01-22 PROCEDURE — 99232 SBSQ HOSP IP/OBS MODERATE 35: CPT | Performed by: HOSPITALIST

## 2018-01-22 PROCEDURE — 770021 HCHG ROOM/CARE - ISO PRIVATE

## 2018-01-22 PROCEDURE — 80048 BASIC METABOLIC PNL TOTAL CA: CPT

## 2018-01-22 PROCEDURE — A9270 NON-COVERED ITEM OR SERVICE: HCPCS | Performed by: INTERNAL MEDICINE

## 2018-01-22 PROCEDURE — 700102 HCHG RX REV CODE 250 W/ 637 OVERRIDE(OP): Performed by: HOSPITALIST

## 2018-01-22 PROCEDURE — 306589 SLEEVE,VASO CALF LARGE: Performed by: HOSPITALIST

## 2018-01-22 PROCEDURE — A9270 NON-COVERED ITEM OR SERVICE: HCPCS | Performed by: HOSPITALIST

## 2018-01-22 PROCEDURE — 700111 HCHG RX REV CODE 636 W/ 250 OVERRIDE (IP): Performed by: INTERNAL MEDICINE

## 2018-01-22 PROCEDURE — 82962 GLUCOSE BLOOD TEST: CPT

## 2018-01-22 RX ORDER — NYSTATIN 100000 [USP'U]/G
POWDER TOPICAL 2 TIMES DAILY
Status: DISCONTINUED | OUTPATIENT
Start: 2018-01-22 | End: 2018-01-24 | Stop reason: HOSPADM

## 2018-01-22 RX ADMIN — OXYCODONE HYDROCHLORIDE 5 MG: 5 TABLET ORAL at 16:28

## 2018-01-22 RX ADMIN — CARVEDILOL 3.12 MG: 3.12 TABLET, FILM COATED ORAL at 07:45

## 2018-01-22 RX ADMIN — NYSTATIN 1500000 UNITS: 100000 POWDER TOPICAL at 22:20

## 2018-01-22 RX ADMIN — ASPIRIN 81 MG: 81 TABLET, COATED ORAL at 07:44

## 2018-01-22 RX ADMIN — CARVEDILOL 3.12 MG: 3.12 TABLET, FILM COATED ORAL at 16:28

## 2018-01-22 RX ADMIN — OXYCODONE HYDROCHLORIDE 5 MG: 5 TABLET ORAL at 22:20

## 2018-01-22 RX ADMIN — OXYCODONE HYDROCHLORIDE 5 MG: 5 TABLET ORAL at 05:49

## 2018-01-22 RX ADMIN — FAMOTIDINE 20 MG: 20 TABLET, FILM COATED ORAL at 22:20

## 2018-01-22 RX ADMIN — LINEZOLID 600 MG: 600 TABLET, FILM COATED ORAL at 22:20

## 2018-01-22 RX ADMIN — AMLODIPINE BESYLATE 10 MG: 10 TABLET ORAL at 07:44

## 2018-01-22 RX ADMIN — LINEZOLID 600 MG: 600 TABLET, FILM COATED ORAL at 07:45

## 2018-01-22 RX ADMIN — CELECOXIB 200 MG: 200 CAPSULE ORAL at 22:20

## 2018-01-22 RX ADMIN — DIPHENHYDRAMINE HYDROCHLORIDE 25 MG: 50 INJECTION, SOLUTION INTRAMUSCULAR; INTRAVENOUS at 06:08

## 2018-01-22 RX ADMIN — LISINOPRIL 40 MG: 20 TABLET ORAL at 07:45

## 2018-01-22 RX ADMIN — CELECOXIB 200 MG: 200 CAPSULE ORAL at 07:45

## 2018-01-22 RX ADMIN — PRAVASTATIN SODIUM 20 MG: 10 TABLET ORAL at 22:20

## 2018-01-22 ASSESSMENT — PAIN SCALES - GENERAL
PAINLEVEL_OUTOF10: 1
PAINLEVEL_OUTOF10: 7
PAINLEVEL_OUTOF10: 7

## 2018-01-22 ASSESSMENT — ENCOUNTER SYMPTOMS
PALPITATIONS: 0
MYALGIAS: 0
HEADACHES: 0
ABDOMINAL PAIN: 0
NAUSEA: 0
SHORTNESS OF BREATH: 0
FEVER: 0
PSYCHIATRIC NEGATIVE: 1
FALLS: 0
DIZZINESS: 0
WEAKNESS: 1
DIARRHEA: 0
VOMITING: 0
CHILLS: 0

## 2018-01-22 NOTE — PROGRESS NOTES
Notified hospitalist Dr. Fang regarding new onset of pruritis and rash noted to bilateral arms and back, patient reports similar reaction to penicillin, stopped continuous abx at 1815, MD returned my page, received telephone order to administer 25 mg benadryl IV STAT and to continue Unasyn at the same rate.

## 2018-01-22 NOTE — DISCHARGE PLANNING
Received call from Jason at [facility] they have declined patient as not providers with insurance.

## 2018-01-22 NOTE — CARE PLAN
Problem: Nutritional:  Goal: Achieve adequate nutritional intake  Patient will consume >75% of meals   Outcome: MET Date Met: 01/22/18  Patient eating very well with % of all meals consumed.   RD available PRN.

## 2018-01-22 NOTE — DISCHARGE PLANNING
Received notice from [facility], they have declined patient as no [facility] beds and Med Cost.

## 2018-01-22 NOTE — CARE PLAN
Problem: Infection  Goal: Will remain free from infection    Intervention: Assess signs and symptoms of infection  Changed PICC site due to improper dressing placement, insertion site pink, no s/s of infection, continuous abx running through PICC, remains closed system, administered zyvox according to MAR.       Problem: Skin Integrity  Goal: Risk for impaired skin integrity will decrease    Intervention: Assess and monitor skin integrity, appearance and/or temperature  Changed dressing to right foot, compared to last photo taken by LPS, wound has become slightly more red and periwound is purple/red but blanching, notified LPS Sathish, no answer left message, changed dressing according to order, wound picture uploaded to media.

## 2018-01-22 NOTE — DISCHARGE PLANNING
Medical Social Work    Per pts chart, pt needs a SNF referral. SW met with pt at bedside who requested that referrals be sent to [place] and blanket on the rest. SW addressed all questions and encouraged follow up as needed. SW sent the choice form to [facility]SE[facility] Toure and confirmed receipt of choice form. SW to monitor response status and follow up with care team.

## 2018-01-22 NOTE — PROGRESS NOTES
Difficulty with pt's PICC again tonight. It only infuses well at limited arm positions. Unfortunately, the positioning it likes is very awkward and uncomfortable for the patient, and it interrupts with his sleep. Switched the continuous abx to the PIV per pt request.      Pt has been itching a lot due to allergies to abx. Ordered Benadryl IV with moderate relief. Dressing clean, dry, and intact.

## 2018-01-22 NOTE — DISCHARGE PLANNING
Received call from Lilibeth  [phone] from [employer]. She states that she will auth SNF for this pt. Will request SNF order and ask SW to see pt for SNF choice.

## 2018-01-22 NOTE — DISCHARGE PLANNING
Received call from Saskia at [facility] they have accepted patient and will start on insurance authorization.

## 2018-01-22 NOTE — DISCHARGE PLANNING
Received choice form [facility].  Referral sent to [place],and all local SNF at [facility] on 01-22-18

## 2018-01-23 LAB
GLUCOSE BLD-MCNC: 115 MG/DL (ref 65–99)
GLUCOSE BLD-MCNC: 126 MG/DL (ref 65–99)
GLUCOSE BLD-MCNC: 147 MG/DL (ref 65–99)
GLUCOSE BLD-MCNC: 187 MG/DL (ref 65–99)
GLUCOSE BLD-MCNC: 196 MG/DL (ref 65–99)

## 2018-01-23 PROCEDURE — 99233 SBSQ HOSP IP/OBS HIGH 50: CPT | Performed by: INTERNAL MEDICINE

## 2018-01-23 PROCEDURE — 700111 HCHG RX REV CODE 636 W/ 250 OVERRIDE (IP): Performed by: HOSPITALIST

## 2018-01-23 PROCEDURE — 700102 HCHG RX REV CODE 250 W/ 637 OVERRIDE(OP): Performed by: INTERNAL MEDICINE

## 2018-01-23 PROCEDURE — 700105 HCHG RX REV CODE 258

## 2018-01-23 PROCEDURE — A9270 NON-COVERED ITEM OR SERVICE: HCPCS | Performed by: INTERNAL MEDICINE

## 2018-01-23 PROCEDURE — 700102 HCHG RX REV CODE 250 W/ 637 OVERRIDE(OP): Performed by: HOSPITALIST

## 2018-01-23 PROCEDURE — 770021 HCHG ROOM/CARE - ISO PRIVATE

## 2018-01-23 PROCEDURE — A9270 NON-COVERED ITEM OR SERVICE: HCPCS | Performed by: HOSPITALIST

## 2018-01-23 PROCEDURE — 82962 GLUCOSE BLOOD TEST: CPT | Mod: 91

## 2018-01-23 RX ORDER — SODIUM CHLORIDE 9 MG/ML
INJECTION, SOLUTION INTRAVENOUS
Status: COMPLETED
Start: 2018-01-23 | End: 2018-01-23

## 2018-01-23 RX ADMIN — LINEZOLID 600 MG: 600 TABLET, FILM COATED ORAL at 20:33

## 2018-01-23 RX ADMIN — CARVEDILOL 3.12 MG: 3.12 TABLET, FILM COATED ORAL at 17:30

## 2018-01-23 RX ADMIN — PRAVASTATIN SODIUM 20 MG: 10 TABLET ORAL at 20:33

## 2018-01-23 RX ADMIN — ASPIRIN 81 MG: 81 TABLET, COATED ORAL at 08:13

## 2018-01-23 RX ADMIN — INSULIN HUMAN 2 UNITS: 100 INJECTION, SOLUTION PARENTERAL at 20:39

## 2018-01-23 RX ADMIN — OXYCODONE HYDROCHLORIDE 5 MG: 5 TABLET ORAL at 15:03

## 2018-01-23 RX ADMIN — ENOXAPARIN SODIUM 40 MG: 100 INJECTION SUBCUTANEOUS at 08:13

## 2018-01-23 RX ADMIN — LINEZOLID 600 MG: 600 TABLET, FILM COATED ORAL at 08:13

## 2018-01-23 RX ADMIN — NYSTATIN 1500000 UNITS: 100000 POWDER TOPICAL at 20:34

## 2018-01-23 RX ADMIN — CELECOXIB 200 MG: 200 CAPSULE ORAL at 20:33

## 2018-01-23 RX ADMIN — FAMOTIDINE 20 MG: 20 TABLET, FILM COATED ORAL at 20:33

## 2018-01-23 RX ADMIN — CELECOXIB 200 MG: 200 CAPSULE ORAL at 08:13

## 2018-01-23 RX ADMIN — NYSTATIN 1500000 UNITS: 100000 POWDER TOPICAL at 08:14

## 2018-01-23 RX ADMIN — SODIUM CHLORIDE 500 ML: 9 INJECTION, SOLUTION INTRAVENOUS at 21:58

## 2018-01-23 RX ADMIN — OXYCODONE HYDROCHLORIDE 5 MG: 5 TABLET ORAL at 20:42

## 2018-01-23 RX ADMIN — CARVEDILOL 3.12 MG: 3.12 TABLET, FILM COATED ORAL at 08:16

## 2018-01-23 RX ADMIN — LISINOPRIL 40 MG: 20 TABLET ORAL at 08:13

## 2018-01-23 ASSESSMENT — ENCOUNTER SYMPTOMS
VOMITING: 0
WEAKNESS: 1
FOCAL WEAKNESS: 0
NERVOUS/ANXIOUS: 0
NAUSEA: 0
DEPRESSION: 0
MYALGIAS: 0
PALPITATIONS: 0
FEVER: 0
HEADACHES: 0
CHILLS: 0
SHORTNESS OF BREATH: 0
DIARRHEA: 0
ABDOMINAL PAIN: 0
COUGH: 0

## 2018-01-23 ASSESSMENT — PAIN SCALES - GENERAL: PAINLEVEL_OUTOF10: 7

## 2018-01-23 NOTE — DISCHARGE PLANNING
ATTN: Case Management  RE: Referral for Home Health  **Spoke with HUSSEIN Ortiz , we will need current ABX orders for Mr. Castro.  Diana stated she will get them and send them to Home Health. **              We would like to take this opportunity to thank you for submitting a referral for your patient to continue care with [facility]. Our skilled team is dedicated to helping all patients recover and gain independence in the home setting.            As of 1/23/18, we have accepted the above patient into our service. A [facility] clinician will be out to see the patient within 48 hours to conduct our initial visit. If you have any questions or concerns regarding the patient’s transition to Home Health, please do not hesitate to contact us. We are open for referrals 7 days a week from 8AM to 5PM at [phone].      We look forward to collaborating with you,  [facility] Team

## 2018-01-23 NOTE — CARE PLAN
Problem: Communication  Goal: The ability to communicate needs accurately and effectively will improve  Discussed POC, pt communicates questions and poc well. Pt and family understand poc.    Problem: Safety  Goal: Will remain free from injury  Educated on safety measures, using call light ect

## 2018-01-23 NOTE — PROGRESS NOTES
"Infectious Disease Progress Note    Author: Uzma Bazan M.D. Date & Time of service: 2018  6:45 PM    Chief Complaint:  diabetic foot ulcer at surgical site      Interval History:  52 y.o. diabetic male admitted 1/15/2018 pain/open wound at the site of right great toe amputation   AF WBC 8.5 seen with LPS Tolerating abx well   Af tolerating abx well-plan for OR today   AF tolerated surgery well-had a lot of nausea with morphine   AF no new complaints awaiting PICC   AF got PICC was told during last dressing change his wound looked \"angry\"   AF problems with PICC noted-currently working  2018-MAXIMUM TEMPERATURE 98.2. WBC 5 creatinine 0.66  Labs Reviewed, Medications Reviewed, Radiology Reviewed and Wound Reviewed.    Review of Systems:  Review of Systems   Constitutional: Negative for chills and fever.   Gastrointestinal: Negative for abdominal pain, diarrhea and vomiting.   Musculoskeletal: Positive for joint pain.   All other systems reviewed and are negative.      Hemodynamics:  Temp (24hrs), Av.4 °C (97.6 °F), Min:36.1 °C (97 °F), Max:36.8 °C (98.2 °F)  Temperature: 36.1 °C (97 °F)  Pulse  Av.2  Min: 61  Max: 104   Blood Pressure: 105/75       Physical Exam:  Physical Exam   Constitutional: He is oriented to person, place, and time. He appears well-developed. No distress.   Obese   HENT:   Head: Normocephalic and atraumatic.   Eyes: EOM are normal. Pupils are equal, round, and reactive to light.   Neck: Neck supple.   Cardiovascular: Normal rate.    Pulmonary/Chest: Effort normal and breath sounds normal. No respiratory distress.   Abdominal: Soft. He exhibits no distension. There is no tenderness.   Musculoskeletal: He exhibits edema and tenderness.   Foot -surgical site well-approx with sutures but increased surrounding erythema and edema   Neurological: He is alert and oriented to person, place, and time.   Skin: There is erythema.   Nursing note and vitals " reviewed.      Meds:    Current Facility-Administered Medications:   •  diphenhydrAMINE  •  [COMPLETED] ampicillin-sulbactam (UNASYN) IV **AND** ampicillin-sulbactam (UNASYN) continuous infusion  •  aspirin  •  carvedilol  •  celecoxib  •  famotidine  •  lisinopril  •  pravastatin  •  senna-docusate **AND** polyethylene glycol/lytes **AND** magnesium hydroxide **AND** bisacodyl  •  Respiratory Care per Protocol  •  acetaminophen  •  Notify provider if pain remains uncontrolled **AND** Use the numeric rating scale (NRS-11) on regular floors and Critical-Care Pain Observation Tool (CPOT) on ICUs/Trauma to assess pain **AND** Pulse Ox (Oximetry) **AND** Pharmacy Consult Request **AND** If patient difficult to arouse and/or has respiratory depression, stop any opiates that are currently infusing and call a Rapid Response. **AND** oxycodone immediate-release **AND** oxycodone immediate-release **AND** morphine injection  •  insulin regular **AND** Accu-Chek ACHS **AND** NOTIFY MD and PharmD **AND** glucose 4 g **AND** dextrose 50%  •  ondansetron  •  ondansetron  •  linezolid    Labs:  Recent Labs      01/20/18   0359  01/22/18   0502   WBC  6.6  5.0   RBC  4.30*  4.89   HEMOGLOBIN  12.0*  13.8*   HEMATOCRIT  35.6*  40.3*   MCV  82.8  82.4   MCH  27.9  28.2   RDW  44.2  44.6   PLATELETCT  201  241   MPV  10.1  10.2     Recent Labs      01/20/18   0359  01/22/18   0502   SODIUM  137  136   POTASSIUM  3.7  3.6   CHLORIDE  103  104   CO2  26  24   GLUCOSE  136*  136*   BUN  19  16     Recent Labs      01/20/18   0359  01/22/18   0502   CREATININE  0.79  0.66       Imaging:  Dx-foot-complete 3+ Right    Result Date: 1/15/2018  1/15/2018 1:07 PM HISTORY/REASON FOR EXAM:  Atraumatic Pain/Swelling/Deformity Right foot pain s/p Great toe amputation. Pt had a recent toe amputation on the right foot. Home health nurse checked his foot this morning and sts his open wound is full thickness and needed to come in TECHNIQUE/EXAM  DESCRIPTION AND NUMBER OF VIEWS: 3 views of the RIGHT foot. COMPARISON:  11/10/2017 FINDINGS: Prior amputation of great toe at the level of the metatarsophalangeal joint. Focal soft tissue swelling over the distal 1st metatarsal.  Lucency within soft tissues consistent with ulceration.  No soft tissue gas or radiopaque foreign body.  No focal bony destruction. Degenerative change of the midfoot. Calcaneal enthesophytes present. Forefoot and ankle soft tissue swelling.     1.  Prior great toe amputation. 2.  RIGHT forefoot soft tissue swelling with ulceration overlying the distal 1st metatarsal head. 3.  Forefoot and ankle soft tissue swelling. 4.  No gross bone destruction, however osteomyelitis is not excluded by plain film alone.      Micro:  Results     Procedure Component Value Units Date/Time    AFB CULTURE [[id number]] Collected:  01/17/18 1278    Order Status:  Completed Specimen:  Bone Updated:  01/20/18 8578     Significant Indicator NEG     Source BONE     Site Right Foot Sesamoid     AFB Culture Culture in progress.     AFB Smear Results No acid fast bacilli seen.    Narrative:       CALL  [place]  131 tel. [phone],    CULTURE TISSUE W/ GRM STAIN [[id number]]  (Abnormal)  (Susceptibility) Collected:  01/17/18 6105    Order Status:  Completed Specimen:  Bone Updated:  01/20/18 7429     Significant Indicator POS (POS)     Source BONE     Site Right Foot Sesamoid     Tissue Culture -- (A)     Gram Stain Result No organisms seen.     Tissue Culture -- (A)     Enterococcus faecalis  Moderate growth  Combination therapy with ampicillin, penicillin, or  vancomycin (for susceptible strains) plus an aminoglycoside  is usually indicated for serious enterococcal infections,  such as endocarditis unless high-level resistance to both  gentamicin and streptomycin is documented; such combinations  are predicted to result in synergistic killing of the  Enterococcus.  The susceptibility profile for this organism indicates  that  Streptomycin would not be an effective component of  combination therapy.  The susceptibility profile for this organism indicates that  Gentamycin would not be an effective component of combination  therapy.       Tissue Culture -- (A)     Methicillin Resistant Staphylococcus aureus  Moderate growth  This isolate is presumed to be clindamycin resistant based on  detection of inducible resistance.  Clindamycin may still  be effective in some patients.      Narrative:       CALL  [place]  131 tel. [phone],    Culture & Susceptibility     ENTEROCOCCUS FAECALIS     Antibiotic Sensitivity Microscan Unit Status    Ampicillin Sensitive <=2 mcg/mL Final    Daptomycin Sensitive 2 mcg/mL Final    Gent Synergy Resistant >500 mcg/mL Final    Penicillin Sensitive 8 mcg/mL Final    Strep Synergy Resistant >1000 mcg/mL Final    Vancomycin Sensitive 2 mcg/mL Final              METHICILLIN RESISTANT STAPHYLOCOCCUS AUREUS     Antibiotic Sensitivity Microscan Unit Status    Ampicillin/sulbactam Resistant 16/8 mcg/mL Final    Clindamycin Resistant <=0.5 mcg/mL Final    Daptomycin Sensitive 1 mcg/mL Final    Erythromycin Resistant >4 mcg/mL Final    Moxifloxacin Resistant >4 mcg/mL Final    Oxacillin Resistant >2 mcg/mL Final    Penicillin Resistant >8 mcg/mL Final    Tetracycline Sensitive <=4 mcg/mL Final    Trimeth/Sulfa Sensitive <=0.5/9.5 mcg/mL Final    Vancomycin Sensitive 2 mcg/mL Final                       ANAEROBIC CULTURE [[id number]] Collected:  01/17/18 1355    Order Status:  Completed Specimen:  Bone Updated:  01/20/18 1534     Significant Indicator NEG     Source BONE     Site Right Foot Sesamoid     Anaerobic Culture, Culture Res No Anaerobes isolated.    Narrative:       CALL  [place]  131 tel. [phone],    FUNGAL CULTURE [[id number]] Collected:  01/17/18 1355    Order Status:  Completed Specimen:  Bone Updated:  01/20/18 1534     Significant Indicator NEG     Source BONE     Site Right Foot Sesamoid     Fungal  Culture Culture in progress.    Narrative:       CALL  [place]  131 tel. [phone],    GRAM STAIN [[id number]] Collected:  01/17/18 1355    Order Status:  Completed Specimen:  Bone Updated:  01/18/18 1639     Significant Indicator .     Source BONE     Site Right Foot Sesamoid     Gram Stain Result No organisms seen.    ACID FAST STAIN [[id number]] Collected:  01/17/18 1355    Order Status:  Completed Specimen:  Bone Updated:  01/18/18 1639     Significant Indicator NEG     Source BONE     Site Right Foot Sesamoid     AFB Smear Results No acid fast bacilli seen.    CULTURE WOUND W/ GRAM STAIN [[id number]]  (Abnormal)  (Susceptibility) Collected:  01/15/18 1216    Order Status:  Completed Specimen:  Wound from Right Foot Updated:  01/17/18 1609     Gram Stain Result --     Moderate WBCs.  Many Gram positive cocci.  Many Gram positive rods.       Significant Indicator POS (POS)     Source WND     Site RIGHT FOOT     Culture Result Wound -- (A)     Culture Result Wound -- (A)     Methicillin Resistant Staphylococcus aureus  Heavy growth  This isolate is presumed to be clindamycin resistant based on  detection of inducible resistance.  Clindamycin may still  be effective in some patients.       Culture Result Wound -- (A)     Enterococcus faecalis  Heavy growth  Combination therapy with ampicillin, penicillin, or  vancomycin (for susceptible strains) plus an aminoglycoside  is usually indicated for serious enterococcal infections,  such as endocarditis unless high-level resistance to both  gentamicin and streptomycin is documented; such combinations  are predicted to result in synergistic killing of the  Enterococcus.  The susceptibility profile for this organism indicates that  Streptomycin would not be an effective component of  combination therapy.  The susceptibility profile for this organism indicates that  Gentamycin would not be an effective component of combination  therapy.      Narrative:       CALL  [place]  131 tel.  [phone],  CALLED  131 tel. [phone] 01/17/2018, 16:09, RB PERF. RESULTS CALLED  TO:[id number],RN;fax to inf ctl    Culture & Susceptibility     ENTEROCOCCUS FAECALIS     Antibiotic Sensitivity Microscan Unit Status    Ampicillin Sensitive <=2 mcg/mL Final    Daptomycin Sensitive 2 mcg/mL Final    Gent Synergy Resistant >500 mcg/mL Final    Penicillin Sensitive 8 mcg/mL Final    Strep Synergy Resistant >1000 mcg/mL Final    Vancomycin Sensitive 2 mcg/mL Final              METHICILLIN RESISTANT STAPHYLOCOCCUS AUREUS     Antibiotic Sensitivity Microscan Unit Status    Ampicillin/sulbactam Resistant 16/8 mcg/mL Final    Clindamycin Resistant <=0.5 mcg/mL Final    Daptomycin Sensitive <=0.5 mcg/mL Final    Erythromycin Resistant >4 mcg/mL Final    Moxifloxacin Resistant >4 mcg/mL Final    Oxacillin Resistant >2 mcg/mL Final    Penicillin Resistant >8 mcg/mL Final    Tetracycline Sensitive <=4 mcg/mL Final    Trimeth/Sulfa Sensitive <=0.5/9.5 mcg/mL Final    Vancomycin Sensitive 2 mcg/mL Final                             Assessment:  Active Hospital Problems    Diagnosis   • Lower limb amputation, great toe (CMS-[facility]) [Z89.419]   • GERD (gastroesophageal reflux disease) [K21.9]   • HLD (hyperlipidemia) [E78.5]   • Hypertension [I10]   • Type 2 diabetes mellitus (CMS-[facility]) [E11.9]   • Obesity [E66.9]       Plan:  Diabetic foot ulcer at prior amputation site, right great toe , worse  Afebrile  No leukocytosis  MRI+ OM-clinical OM  SHAYAN OK in 11/2017  s/p ray amp 1/17  Current wound cxs with MRSA and enterococcus  Anticipate 6 weeks abx post-op-continue Zyvox while in hosp  Dapto as outpatient  May need additional drainage  Will check the wound with wound care in a.m.     PCN allergy   Tolerated Unasyn previously-discontinue Unasyn since Zyvox will cover  Continue Zyvox    Type 2 diabetes mellitus    Keep BS under 150 to help control current infection  Hgb A1C in 11/2017 =9.6

## 2018-01-23 NOTE — PROGRESS NOTES
S:  Seen and examined.   s/p 1st ray amputation.  Doing well this morning.  No new complaints, pain controlled.    O: Blood pressure (!) 94/64, pulse 77, temperature 36.5 °C (97.7 °F), resp. rate 16, height 1.829 m (6'), weight (!) 132.5 kg (292 lb), SpO2 98 %..    Intake/Output Summary (Last 24 hours) at 01/23/18 1559  Last data filed at 01/23/18 1300   Gross per 24 hour   Intake           728.92 ml   Output             1100 ml   Net          -371.08 ml   .    Operative/injured extremity examined.  Wound c/d/i    Recent Labs      01/22/18   0502   WBC  5.0   RBC  4.89   HEMOGLOBIN  13.8*   HEMATOCRIT  40.3*   MCV  82.4   MCH  28.2   MCHC  34.2   RDW  44.6   PLATELETCT  241   MPV  10.2       A/P:    s/p 1st ray amputation    Antibiotics: Per ID recs  Activity: Heel WBAT operative extremity.  PT today.  Diet: General  DVT: Mechanical (SCDs) + Pharmacologic (Per medicine)  Dispo: D/C planning

## 2018-01-23 NOTE — FACE TO FACE
Face to Face Supporting Documentation - Home Health    The encounter with this patient was in whole or in part the primary reason for home health admission.    Date of encounter:   Patient:                    MRN:                       YOB: 2020  Sebastián Castro  [id number]  1965     Home health to see patient for:  Skilled Nursing care for assessment, interventions & education, Wound Care, Physical Therapy evaluation and treatment and Occupational therapy evaluation and treatment    Skilled need for:  Exacerbation of Chronic Disease State Lower extremity osteomyelitis    Skilled nursing interventions to include:  Home IV infusion therapy    Homebound status evidenced by:  Need the aid of supportive devices such as crutches, canes, wheelchairs or walkers or Needs the assistance of another person in order to leave the home. Leaving home requires a considerable and taxing effort. There is a normal inability to leave the home.    Community Physician to provide follow up care: Jerad Lomax M.D.     Optional Interventions? No      I certify the face to face encounter for this home health care referral meets the CMS requirements and the encounter/clinical assessment with the patient was, in whole, or in part, for the medical condition(s) listed above, which is the primary reason for home health care. Based on my clinical findings: the service(s) are medically necessary, support the need for home health care, and the homebound criteria are met.  I certify that this patient has had a face to face encounter by myself.  Tamy Sanchez D.O. - NPI: [id number]

## 2018-01-23 NOTE — PROGRESS NOTES
[facility] Hospitalist Progress Note    Date of Service: 2018    Chief Complaint  52 y.o. male admitted 1/15/2018 with toe pain and drainage from recent amputation.    Interval Problem Update  Continues to have issues with the PICC line at night, sounds to be positional. Dressing was just changed by LPS, much less tender than 2 days prior. No complaints of further lightheadedness.    Unasyn resumed on  without issue, developed generalized itching last night. Symptoms improved with benadryl    Consultants/Specialty  ID  Surgery  DM educators    Disposition  Pending medical and surgical clearance.  SNF vs OPIC (transport needed)        Review of Systems   Constitutional: Negative for chills, fever and malaise/fatigue.   Respiratory: Negative for shortness of breath.    Cardiovascular: Negative for chest pain, palpitations and leg swelling.   Gastrointestinal: Negative for abdominal pain, nausea and vomiting.   Genitourinary: Negative for dysuria.   Musculoskeletal: Positive for joint pain. Negative for falls and myalgias.   Skin: Positive for rash.        Foot bandaged and wrapped with and ACE. C/D/I   Neurological: Positive for weakness. Negative for dizziness and headaches.   Psychiatric/Behavioral: Negative.    All other systems reviewed and are negative.     Physical Exam  Laboratory/Imaging   Hemodynamics  Temp (24hrs), Av.3 °C (97.4 °F), Min:36.1 °C (97 °F), Max:36.5 °C (97.7 °F)   Temperature: 36.1 °C (97 °F)  Pulse  Av.2  Min: 61  Max: 104    Blood Pressure: 105/51      Respiratory      Respiration: 17, Pulse Oximetry: 95 %     Work Of Breathing / Effort: Mild  RUL Breath Sounds: Clear, RML Breath Sounds: Clear, RLL Breath Sounds: Clear, ELIAS Breath Sounds: Clear, LLL Breath Sounds: Clear    Fluids    Intake/Output Summary (Last 24 hours) at 18  Last data filed at 18 1800   Gross per 24 hour   Intake             1420 ml   Output              200 ml   Net             1220 ml  "      Nutrition  Orders Placed This Encounter   Procedures   • DIET ORDER     Standing Status:   Standing     Number of Occurrences:   1     Order Specific Question:   Diet:     Answer:   Diabetic [3]     Physical Exam   Constitutional: He is oriented to person, place, and time. He appears well-developed. No distress.   HENT:   Mouth/Throat: Oropharynx is clear and moist.   Eyes: EOM are normal. Pupils are equal, round, and reactive to light. No scleral icterus.   Neck: Normal range of motion. Neck supple.   Cardiovascular: Normal rate and regular rhythm.    Pulmonary/Chest: Effort normal. No respiratory distress.   Abdominal: Soft. He exhibits no distension. There is no tenderness.   Musculoskeletal: He exhibits edema.   Lymphadenopathy:     He has no cervical adenopathy.   Neurological: He is alert and oriented to person, place, and time.   Skin: Skin is warm. There is erythema.   Examined with LPS 1/20; stitches in place with significant swelling and erythema. Much improved with less swelling on 1/22.   Psychiatric: He has a normal mood and affect. His speech is normal.   Vitals reviewed.      Recent Labs      01/20/18   0359  01/22/18   0502   WBC  6.6  5.0   RBC  4.30*  4.89   HEMOGLOBIN  12.0*  13.8*   HEMATOCRIT  35.6*  40.3*   MCV  82.8  82.4   MCH  27.9  28.2   MCHC  33.7  34.2   RDW  44.2  44.6   PLATELETCT  201  241   MPV  10.1  10.2     Recent Labs      01/20/18   0359  01/22/18   0502   SODIUM  137  136   POTASSIUM  3.7  3.6   CHLORIDE  103  104   CO2  26  24   GLUCOSE  136*  136*   BUN  19  16   CREATININE  0.79  0.66   CALCIUM  8.5  8.3*                      Assessment/Plan     Lower limb amputation, great toe (CMS-[facility])- (present on admission)   Assessment & Plan    R great toe amputation with Dr Leo on 11/10/2017 s/p 6 weeks abx. Subsequent infection /OM. Now s/p \"right failed great toe amputation with metatarsal head osteomyelitis\" on 1/17. S/p PICC line on 1/19.  - ID and surgery following, " greatly appreciate  - continue zyvox, unasyn  - LPS following, greatly appreciate  - pain control  - SNF referral placed    MRI foot:  First metatarsal head edema and enhancement is compatible with osteomyelitis, seen at the deep margin of deep skin ulceration at the amputation stump  Great toe amputation  Cellulitis, granulation tissue, phlegmonous change. No abscess  Medial first metatarsal head sesamoid edema and enhancement could be reactive or from osteomyelitis        Type 2 diabetes mellitus (CMS-[facility])- (present on admission)   Assessment & Plan    Uncontrolled with hyperglycemia. A1C 9.2%. FS have remained 180 or less over the last 24 hours.  - Holding oral hypoglycemic agents  - SSI and hypoglycemia protocol  - goal FS< 180 for better wound healing        HLD (hyperlipidemia)- (present on admission)   Assessment & Plan    Pravastatin        GERD (gastroesophageal reflux disease)- (present on admission)   Assessment & Plan    Stable.  - Pepcid        Hypertension- (present on admission)   Assessment & Plan    Normotensive to hypotensive  - Continue carvedilol, lisinopril   - d/c amlodipine        Obesity- (present on admission)   Assessment & Plan    Body mass index is 39.6 kg/m².            Reviewed items::  Medications reviewed and Labs reviewed  Zabala catheter::  No Zabala  DVT prophylaxis pharmacological::  Enoxaparin (Lovenox)  DVT prophylaxis - mechanical:  SCDs  Ulcer Prophylaxis::  Yes

## 2018-01-23 NOTE — DISCHARGE PLANNING
Received order for home infusion, faxed to [facility]. Will need order for HH, pt wants to resume with [facility]. Pt agrees with plan for home infusion with home health.

## 2018-01-23 NOTE — PROGRESS NOTES
S:  Seen and examined.  s/p first ray amputation.  Doing well this morning.  No new complaints, pain controlled.  Medicine and ID concerned regarding erythema.    O: Blood pressure 105/51, pulse 72, temperature 36.1 °C (97 °F), resp. rate 17, height 1.829 m (6'), weight (!) 132.5 kg (292 lb), SpO2 95 %..      Intake/Output Summary (Last 24 hours) at 01/22/18 2252  Last data filed at 01/22/18 1800   Gross per 24 hour   Intake             1420 ml   Output                0 ml   Net             1420 ml   .    Operative/injured extremity examined.  Wound c/d/i.  Minimal surrounding erythema    Recent Labs      01/20/18   0359  01/22/18   0502   WBC  6.6  5.0   RBC  4.30*  4.89   HEMOGLOBIN  12.0*  13.8*   HEMATOCRIT  35.6*  40.3*   MCV  82.8  82.4   MCH  27.9  28.2   MCHC  33.7  34.2   RDW  44.2  44.6   PLATELETCT  201  241   MPV  10.1  10.2       A/P:    POD #5 s/p R first ray amputation - doing well, erythema not concerning    Antibiotics: Per id  Activity: Heel WBAT operative extremity.  PT today.  Diet: General  DVT: Mechanical (SCDs) + Pharmacologic (per medicine, none required for ortho)  Dispo: D/C planning

## 2018-01-23 NOTE — DISCHARGE PLANNING
[facility] received HH and Infusion choice form. Per the choice forms the referral has been sent to [facility], [facility] and [facility].

## 2018-01-23 NOTE — DISCHARGE PLANNING
IHD worker here and states that he is working on setting pt up with [facility] for rides to F/U appointments. [facility] Awaiting auth from [facility]. Pt is not medically cleared for discharge today.

## 2018-01-23 NOTE — DISCHARGE PLANNING
Received notification from [facility] that they have denied due to cost of Daptomycin. Called Lilibeth at [facility], she is unable to adjust the rate of pay for SNF.

## 2018-01-23 NOTE — PROGRESS NOTES
"Infectious Disease Progress Note    Author: Uzma Bazan M.D. Date & Time of service: 2018  8:53 AM    Chief Complaint:  diabetic foot ulcer at surgical site      Interval History:  52 y.o. diabetic male admitted 1/15/2018 pain/open wound at the site of right great toe amputation   AF WBC 8.5 seen with LPS Tolerating abx well   Af tolerating abx well-plan for OR today   AF tolerated surgery well-had a lot of nausea with morphine   AF no new complaints awaiting PICC   AF got PICC was told during last dressing change his wound looked \"angry\"   AF problems with PICC noted-currently working  2018-MAXIMUM TEMPERATURE 98.2. WBC 5 creatinine 0.66  2018-MAXIMUM TEMPERATURE 98.3. No new issues overnight. WBC 5  Labs Reviewed, Medications Reviewed, Radiology Reviewed and Wound Reviewed.    Review of Systems:  Review of Systems   Constitutional: Negative for chills and fever.   Gastrointestinal: Negative for abdominal pain, diarrhea and vomiting.   Musculoskeletal: Positive for joint pain.   All other systems reviewed and are negative.      Hemodynamics:  Temp (24hrs), Av.4 °C (97.5 °F), Min:36.1 °C (97 °F), Max:36.8 °C (98.3 °F)  Temperature: 36.4 °C (97.6 °F)  Pulse  Av.6  Min: 61  Max: 104   Blood Pressure: 114/67       Physical Exam:  Physical Exam   Constitutional: He is oriented to person, place, and time. He appears well-developed. No distress.   Obese   HENT:   Head: Normocephalic and atraumatic.   Eyes: EOM are normal. Pupils are equal, round, and reactive to light.   Neck: Neck supple.   Cardiovascular: Normal rate.    Pulmonary/Chest: Effort normal and breath sounds normal. No respiratory distress.   Abdominal: Soft. He exhibits no distension. There is no tenderness.   Musculoskeletal: He exhibits edema and tenderness.   Foot -surgical site well-approx with sutures . No drainage no erythema   Neurological: He is alert and oriented to person, place, and time. "   Skin: There is erythema.   Nursing note and vitals reviewed.      Meds:    Current Facility-Administered Medications:   •  enoxaparin (LOVENOX) injection  •  nystatin  •  diphenhydrAMINE  •  aspirin  •  carvedilol  •  celecoxib  •  famotidine  •  lisinopril  •  pravastatin  •  senna-docusate **AND** polyethylene glycol/lytes **AND** magnesium hydroxide **AND** bisacodyl  •  Respiratory Care per Protocol  •  acetaminophen  •  Notify provider if pain remains uncontrolled **AND** Use the numeric rating scale (NRS-11) on regular floors and Critical-Care Pain Observation Tool (CPOT) on ICUs/Trauma to assess pain **AND** Pulse Ox (Oximetry) **AND** Pharmacy Consult Request **AND** If patient difficult to arouse and/or has respiratory depression, stop any opiates that are currently infusing and call a Rapid Response. **AND** oxycodone immediate-release **AND** oxycodone immediate-release **AND** morphine injection  •  insulin regular **AND** Accu-Chek ACHS **AND** NOTIFY MD and PharmD **AND** glucose 4 g **AND** dextrose 50%  •  ondansetron  •  ondansetron  •  linezolid    Labs:  Recent Labs      01/22/18   0502   WBC  5.0   RBC  4.89   HEMOGLOBIN  13.8*   HEMATOCRIT  40.3*   MCV  82.4   MCH  28.2   RDW  44.6   PLATELETCT  241   MPV  10.2     Recent Labs      01/22/18   0502   SODIUM  136   POTASSIUM  3.6   CHLORIDE  104   CO2  24   GLUCOSE  136*   BUN  16     Recent Labs      01/22/18   0502   CREATININE  0.66       Imaging:  Dx-foot-complete 3+ Right    Result Date: 1/15/2018  1/15/2018 1:07 PM HISTORY/REASON FOR EXAM:  Atraumatic Pain/Swelling/Deformity Right foot pain s/p Great toe amputation. Pt had a recent toe amputation on the right foot. Home health nurse checked his foot this morning and sts his open wound is full thickness and needed to come in TECHNIQUE/EXAM DESCRIPTION AND NUMBER OF VIEWS: 3 views of the RIGHT foot. COMPARISON:  11/10/2017 FINDINGS: Prior amputation of great toe at the level of the  metatarsophalangeal joint. Focal soft tissue swelling over the distal 1st metatarsal.  Lucency within soft tissues consistent with ulceration.  No soft tissue gas or radiopaque foreign body.  No focal bony destruction. Degenerative change of the midfoot. Calcaneal enthesophytes present. Forefoot and ankle soft tissue swelling.     1.  Prior great toe amputation. 2.  RIGHT forefoot soft tissue swelling with ulceration overlying the distal 1st metatarsal head. 3.  Forefoot and ankle soft tissue swelling. 4.  No gross bone destruction, however osteomyelitis is not excluded by plain film alone.      Micro:  Results     Procedure Component Value Units Date/Time    AFB CULTURE [[id number]] Collected:  01/17/18 1350    Order Status:  Completed Specimen:  Bone Updated:  01/20/18 1534     Significant Indicator NEG     Source BONE     Site Right Foot Sesamoid     AFB Culture Culture in progress.     AFB Smear Results No acid fast bacilli seen.    Narrative:       CALL  [place]  131 tel. [phone],    CULTURE TISSUE W/ GRM STAIN [[id number]]  (Abnormal)  (Susceptibility) Collected:  01/17/18 9883    Order Status:  Completed Specimen:  Bone Updated:  01/20/18 9154     Significant Indicator POS (POS)     Source BONE     Site Right Foot Sesamoid     Tissue Culture -- (A)     Gram Stain Result No organisms seen.     Tissue Culture -- (A)     Enterococcus faecalis  Moderate growth  Combination therapy with ampicillin, penicillin, or  vancomycin (for susceptible strains) plus an aminoglycoside  is usually indicated for serious enterococcal infections,  such as endocarditis unless high-level resistance to both  gentamicin and streptomycin is documented; such combinations  are predicted to result in synergistic killing of the  Enterococcus.  The susceptibility profile for this organism indicates that  Streptomycin would not be an effective component of  combination therapy.  The susceptibility profile for this organism indicates  that  Gentamycin would not be an effective component of combination  therapy.       Tissue Culture -- (A)     Methicillin Resistant Staphylococcus aureus  Moderate growth  This isolate is presumed to be clindamycin resistant based on  detection of inducible resistance.  Clindamycin may still  be effective in some patients.      Narrative:       CALL  [place]  131 tel. [phone],    Culture & Susceptibility     ENTEROCOCCUS FAECALIS     Antibiotic Sensitivity Microscan Unit Status    Ampicillin Sensitive <=2 mcg/mL Final    Daptomycin Sensitive 2 mcg/mL Final    Gent Synergy Resistant >500 mcg/mL Final    Penicillin Sensitive 8 mcg/mL Final    Strep Synergy Resistant >1000 mcg/mL Final    Vancomycin Sensitive 2 mcg/mL Final              METHICILLIN RESISTANT STAPHYLOCOCCUS AUREUS     Antibiotic Sensitivity Microscan Unit Status    Ampicillin/sulbactam Resistant 16/8 mcg/mL Final    Clindamycin Resistant <=0.5 mcg/mL Final    Daptomycin Sensitive 1 mcg/mL Final    Erythromycin Resistant >4 mcg/mL Final    Moxifloxacin Resistant >4 mcg/mL Final    Oxacillin Resistant >2 mcg/mL Final    Penicillin Resistant >8 mcg/mL Final    Tetracycline Sensitive <=4 mcg/mL Final    Trimeth/Sulfa Sensitive <=0.5/9.5 mcg/mL Final    Vancomycin Sensitive 2 mcg/mL Final                       ANAEROBIC CULTURE [[id number]] Collected:  01/17/18 1355    Order Status:  Completed Specimen:  Bone Updated:  01/20/18 1534     Significant Indicator NEG     Source BONE     Site Right Foot Sesamoid     Anaerobic Culture, Culture Res No Anaerobes isolated.    Narrative:       CALL  [place]  131 tel. [phone],    FUNGAL CULTURE [[id number]] Collected:  01/17/18 1355    Order Status:  Completed Specimen:  Bone Updated:  01/20/18 1534     Significant Indicator NEG     Source BONE     Site Right Foot Sesamoid     Fungal Culture Culture in progress.    Narrative:       [place]  [place]  131 tel. [phone],    GRAM STAIN [[id number]] Collected:  01/17/18 1358     Order Status:  Completed Specimen:  Bone Updated:  01/18/18 1639     Significant Indicator .     Source BONE     Site Right Foot Sesamoid     Gram Stain Result No organisms seen.    ACID FAST STAIN [[id number]] Collected:  01/17/18 1355    Order Status:  Completed Specimen:  Bone Updated:  01/18/18 1639     Significant Indicator NEG     Source BONE     Site Right Foot Sesamoid     AFB Smear Results No acid fast bacilli seen.    CULTURE WOUND W/ GRAM STAIN [[id number]]  (Abnormal)  (Susceptibility) Collected:  01/15/18 1216    Order Status:  Completed Specimen:  Wound from Right Foot Updated:  01/17/18 1609     Gram Stain Result --     Moderate WBCs.  Many Gram positive cocci.  Many Gram positive rods.       Significant Indicator POS (POS)     Source WND     Site RIGHT FOOT     Culture Result Wound -- (A)     Culture Result Wound -- (A)     Methicillin Resistant Staphylococcus aureus  Heavy growth  This isolate is presumed to be clindamycin resistant based on  detection of inducible resistance.  Clindamycin may still  be effective in some patients.       Culture Result Wound -- (A)     Enterococcus faecalis  Heavy growth  Combination therapy with ampicillin, penicillin, or  vancomycin (for susceptible strains) plus an aminoglycoside  is usually indicated for serious enterococcal infections,  such as endocarditis unless high-level resistance to both  gentamicin and streptomycin is documented; such combinations  are predicted to result in synergistic killing of the  Enterococcus.  The susceptibility profile for this organism indicates that  Streptomycin would not be an effective component of  combination therapy.  The susceptibility profile for this organism indicates that  Gentamycin would not be an effective component of combination  therapy.      Narrative:       CALL  [place]  131 tel. [phone],  CALLED  131 tel. [phone] 01/17/2018, 16:09, RB PERF. RESULTS CALLED  TO:ELISA Zhang;fax to inf ctl    Culture &  Susceptibility     ENTEROCOCCUS FAECALIS     Antibiotic Sensitivity Microscan Unit Status    Ampicillin Sensitive <=2 mcg/mL Final    Daptomycin Sensitive 2 mcg/mL Final    Gent Synergy Resistant >500 mcg/mL Final    Penicillin Sensitive 8 mcg/mL Final    Strep Synergy Resistant >1000 mcg/mL Final    Vancomycin Sensitive 2 mcg/mL Final              METHICILLIN RESISTANT STAPHYLOCOCCUS AUREUS     Antibiotic Sensitivity Microscan Unit Status    Ampicillin/sulbactam Resistant 16/8 mcg/mL Final    Clindamycin Resistant <=0.5 mcg/mL Final    Daptomycin Sensitive <=0.5 mcg/mL Final    Erythromycin Resistant >4 mcg/mL Final    Moxifloxacin Resistant >4 mcg/mL Final    Oxacillin Resistant >2 mcg/mL Final    Penicillin Resistant >8 mcg/mL Final    Tetracycline Sensitive <=4 mcg/mL Final    Trimeth/Sulfa Sensitive <=0.5/9.5 mcg/mL Final    Vancomycin Sensitive 2 mcg/mL Final                             Assessment:  Active Hospital Problems    Diagnosis   • Lower limb amputation, great toe (CMS-[facility]) [Z89.419]   • GERD (gastroesophageal reflux disease) [K21.9]   • HLD (hyperlipidemia) [E78.5]   • Hypertension [I10]   • Type 2 diabetes mellitus (CMS-HCC) [E11.9]   • Obesity [E66.9]       Plan:  Diabetic foot ulcer at prior amputation site, right great toe ,   Afebrile  No leukocytosis  MRI+ OM-clinical OM  SHAYAN OK in 11/2017  s/p ray amp 1/17  Current wound cxs with MRSA and enterococcus  Anticipate 4 weeks abx post-op-continue Zyvox while in hosp  Dapto as outpatient  Orders for daptomycin through 2/17/2018 given to case management     PCN allergy   Tolerated Unasyn previously-discontinue Unasyn since Zyvox will cover  Continue Zyvox    Type 2 diabetes mellitus    Keep BS under 150 to help control current infection  Hgb A1C in 11/2017 =9.6    We will follow him as an outpatient

## 2018-01-23 NOTE — PROGRESS NOTES
Dr. Friedman notified that patient has redness and rash to groin and under abd fold. Patient uses nystatin powder at home. Nystatin powder BID ordered.

## 2018-01-23 NOTE — CARE PLAN
Problem: Infection  Goal: Will remain free from infection  Outcome: PROGRESSING AS EXPECTED  Patient afebrile. Unasyn 20.83 ml/hr continuous dose discontinued by ID and Zyvox 600 mg BID PO given. PICC dressing c/d/i and no s/s infection. Will continue to monitor.

## 2018-01-23 NOTE — PROGRESS NOTES
[facility]    Date of Service: 2018    Chief Complaint  52 y.o. male admitted 1/15/2018 with toe pain and drainage from recent amputation.    Interval Problem Update  Improving bilateral lower extremity pain  Pain control    Consultants/Specialty  ID  Surgery  DM educators    Disposition  Pending medical and surgical clearance.  SNF vs [facility] (transport needed)  Home health order placed  Antibiotics changed to daptomycin for home infusion        Review of Systems   Constitutional: Negative for chills and fever.   Respiratory: Negative for cough and shortness of breath.    Cardiovascular: Negative for chest pain, palpitations and leg swelling.   Gastrointestinal: Negative for abdominal pain and nausea.   Genitourinary: Negative for dysuria.   Musculoskeletal: Positive for joint pain. Negative for myalgias.   Skin: Positive for rash.        Foot bandaged and wrapped with and ACE. C/D/I   Neurological: Positive for weakness. Negative for focal weakness and headaches.   Psychiatric/Behavioral: Negative for depression. The patient is not nervous/anxious.    All other systems reviewed and are negative.     Physical Exam  Laboratory/Imaging   Hemodynamics  Temp (24hrs), Av.4 °C (97.5 °F), Min:36.1 °C (97 °F), Max:36.8 °C (98.3 °F)   Temperature: 36.5 °C (97.7 °F)  Pulse  Av.6  Min: 61  Max: 104    Blood Pressure: (!) 94/64      Respiratory      Respiration: 16, Pulse Oximetry: 98 %     Work Of Breathing / Effort: Mild  RUL Breath Sounds: Clear, RML Breath Sounds: Clear, RLL Breath Sounds: Clear, ELIAS Breath Sounds: Clear, LLL Breath Sounds: Clear    Fluids    Intake/Output Summary (Last 24 hours) at 18 1549  Last data filed at 18 1300   Gross per 24 hour   Intake           728.92 ml   Output             1100 ml   Net          -371.08 ml       Nutrition  Orders Placed This Encounter   Procedures   • DIET ORDER     Standing Status:   Standing     Number of Occurrences:   1      "Order Specific Question:   Diet:     Answer:   Diabetic [3]     Physical Exam   Constitutional: He is oriented to person, place, and time. No distress.   HENT:   Mouth/Throat: Oropharynx is clear and moist.   Eyes: EOM are normal. Pupils are equal, round, and reactive to light.   Neck: Normal range of motion. Neck supple.   Cardiovascular: Normal rate and regular rhythm.    Pulmonary/Chest: Effort normal. No respiratory distress.   Abdominal: Soft. He exhibits no distension. There is no tenderness.   Musculoskeletal: He exhibits edema.   Neurological: He is alert and oriented to person, place, and time. No cranial nerve deficit. Coordination normal.   Skin: Skin is warm. He is not diaphoretic. There is erythema.   Examined with LPS 1/20; stitches in place with significant swelling and erythema. Much improved with less swelling on 1/22.   Psychiatric: He has a normal mood and affect. His speech is normal and behavior is normal. Judgment normal.   Vitals reviewed.      Recent Labs      01/22/18   0502   WBC  5.0   RBC  4.89   HEMOGLOBIN  13.8*   HEMATOCRIT  40.3*   MCV  82.4   MCH  28.2   MCHC  34.2   RDW  44.6   PLATELETCT  241   MPV  10.2     Recent Labs      01/22/18   0502   SODIUM  136   POTASSIUM  3.6   CHLORIDE  104   CO2  24   GLUCOSE  136*   BUN  16   CREATININE  0.66   CALCIUM  8.3*                      Assessment/Plan     Lower limb amputation, great toe (CMS-HCC)- (present on admission)   Assessment & Plan    R great toe amputation with Dr Leo on 11/10/2017 s/p 6 weeks abx. Subsequent infection /OM. Now s/p \"right failed great toe amputation with metatarsal head osteomyelitis\" on 1/17. S/p PICC line on 1/19.  - ID and surgery following, greatly appreciate  - continue zyvox, unasyn  - LPS following, greatly appreciate  - pain control  - SNF referral placed    MRI foot:  First metatarsal head edema and enhancement is compatible with osteomyelitis, seen at the deep margin of deep skin ulceration at the " amputation stump  Great toe amputation  Cellulitis, granulation tissue, phlegmonous change. No abscess  Medial first metatarsal head sesamoid edema and enhancement could be reactive or from osteomyelitis        Type 2 diabetes mellitus (CMS-HCC)- (present on admission)   Assessment & Plan    Uncontrolled with hyperglycemia. A1C 9.2%. FS have remained 180 or less over the last 24 hours.  - Holding oral hypoglycemic agents  - SSI and hypoglycemia protocol  - goal FS< 180 for better wound healing        HLD (hyperlipidemia)- (present on admission)   Assessment & Plan    Pravastatin        GERD (gastroesophageal reflux disease)- (present on admission)   Assessment & Plan    Stable.  - Pepcid        Hypertension- (present on admission)   Assessment & Plan    Normotensive to hypotensive  - Continue carvedilol, lisinopril   - d/c amlodipine        Obesity- (present on admission)   Assessment & Plan    Body mass index is 39.6 kg/m².            Reviewed items::  Medications reviewed and Labs reviewed  Zabala catheter::  No Zabala  DVT prophylaxis pharmacological::  Enoxaparin (Lovenox)  DVT prophylaxis - mechanical:  SCDs  Ulcer Prophylaxis::  Yes

## 2018-01-23 NOTE — PROGRESS NOTES
LIMB PRESERVATION SERVICE     51 y/o male with DM, obesity, HTN, and peripheral neuropathy. Admitted for infected R great toe amputation    POD # 6 s/p R 1st ray amp by Dr. Delong  /67   Pulse 94   Temp 36.4 °C (97.6 °F)   Resp 18   Ht 1.829 m (6')   Wt (!) 132.5 kg (292 lb)   SpO2 95%   BMI 39.60 kg/m²   Pain controlled    Glucose 115 this am  DME has seen pt    Wearing leander boot to RLE when OOB    R 1st ray amputation:   Incision well approximated with sutures. Small draining hematoma at proximal incision. Tenderness with palpation.   Decreased edema and erythema noted    DTI to lateral foot x2.   Proximal DTI measures 1x1cm  Distal DTI measures 1.1x1cm  Both DTI are purple non blanching with red blanching skin to periwound      Nursing to apply single layer xeroform, gauze and kerlix. Ace wrap to be eliminated.        ID involved  PICC in place  IV abx to be arranged         PLAN  Wound care orders updated for nursing  IV abx per ID- 4wk  leander boot when OOB, HWB      D/C plan[facility]. F/U at LPS rounds on Feb 9

## 2018-01-24 VITALS
WEIGHT: 292 LBS | DIASTOLIC BLOOD PRESSURE: 72 MMHG | RESPIRATION RATE: 18 BRPM | HEIGHT: 72 IN | BODY MASS INDEX: 39.55 KG/M2 | TEMPERATURE: 97.4 F | SYSTOLIC BLOOD PRESSURE: 102 MMHG | HEART RATE: 71 BPM | OXYGEN SATURATION: 98 %

## 2018-01-24 LAB
GLUCOSE BLD-MCNC: 137 MG/DL (ref 65–99)
GLUCOSE BLD-MCNC: 145 MG/DL (ref 65–99)
GLUCOSE BLD-MCNC: 150 MG/DL (ref 65–99)

## 2018-01-24 PROCEDURE — 99239 HOSP IP/OBS DSCHRG MGMT >30: CPT | Performed by: INTERNAL MEDICINE

## 2018-01-24 PROCEDURE — 700111 HCHG RX REV CODE 636 W/ 250 OVERRIDE (IP): Performed by: HOSPITALIST

## 2018-01-24 PROCEDURE — A9270 NON-COVERED ITEM OR SERVICE: HCPCS | Performed by: INTERNAL MEDICINE

## 2018-01-24 PROCEDURE — 700102 HCHG RX REV CODE 250 W/ 637 OVERRIDE(OP): Performed by: INTERNAL MEDICINE

## 2018-01-24 PROCEDURE — 700111 HCHG RX REV CODE 636 W/ 250 OVERRIDE (IP): Performed by: INTERNAL MEDICINE

## 2018-01-24 PROCEDURE — 700102 HCHG RX REV CODE 250 W/ 637 OVERRIDE(OP): Performed by: HOSPITALIST

## 2018-01-24 PROCEDURE — A9270 NON-COVERED ITEM OR SERVICE: HCPCS | Performed by: HOSPITALIST

## 2018-01-24 PROCEDURE — 82962 GLUCOSE BLOOD TEST: CPT | Mod: 91

## 2018-01-24 RX ORDER — NYSTATIN 100000 [USP'U]/G
POWDER TOPICAL
Qty: 15 G | Refills: 1 | Status: SHIPPED | OUTPATIENT
Start: 2018-01-24 | End: 2018-02-14

## 2018-01-24 RX ORDER — AMOXICILLIN 250 MG
2 CAPSULE ORAL 2 TIMES DAILY
Qty: 30 TAB | Refills: 0 | Status: SHIPPED | OUTPATIENT
Start: 2018-01-24 | End: 2018-01-25

## 2018-01-24 RX ORDER — DIPHENHYDRAMINE HCL 25 MG
25 CAPSULE ORAL EVERY 6 HOURS PRN
Qty: 30 CAP | Refills: 0 | Status: SHIPPED | OUTPATIENT
Start: 2018-01-24 | End: 2018-01-25

## 2018-01-24 RX ORDER — OXYCODONE HYDROCHLORIDE 5 MG/1
5 TABLET ORAL
Qty: 24 TAB | Refills: 0 | Status: SHIPPED | OUTPATIENT
Start: 2018-01-24 | End: 2018-01-29

## 2018-01-24 RX ORDER — DAPTOMYCIN 50 MG/ML
500 INJECTION, POWDER, LYOPHILIZED, FOR SOLUTION INTRAVENOUS DAILY
Qty: 12000 MG | Refills: 0 | Status: ON HOLD
Start: 2018-01-24 | End: 2018-01-29

## 2018-01-24 RX ORDER — LINEZOLID 600 MG/1
600 TABLET, FILM COATED ORAL EVERY 12 HOURS
Qty: 10 TAB | Refills: 0 | Status: SHIPPED | OUTPATIENT
Start: 2018-01-24 | End: 2018-01-24

## 2018-01-24 RX ADMIN — OXYCODONE HYDROCHLORIDE 5 MG: 5 TABLET ORAL at 13:32

## 2018-01-24 RX ADMIN — OXYCODONE HYDROCHLORIDE 5 MG: 5 TABLET ORAL at 17:27

## 2018-01-24 RX ADMIN — LINEZOLID 600 MG: 600 TABLET, FILM COATED ORAL at 09:02

## 2018-01-24 RX ADMIN — CARVEDILOL 3.12 MG: 3.12 TABLET, FILM COATED ORAL at 17:27

## 2018-01-24 RX ADMIN — ASPIRIN 81 MG: 81 TABLET, COATED ORAL at 09:02

## 2018-01-24 RX ADMIN — CARVEDILOL 3.12 MG: 3.12 TABLET, FILM COATED ORAL at 09:02

## 2018-01-24 RX ADMIN — ALTEPLASE 2 MG: 2.2 INJECTION, POWDER, LYOPHILIZED, FOR SOLUTION INTRAVENOUS at 15:17

## 2018-01-24 RX ADMIN — LISINOPRIL 40 MG: 20 TABLET ORAL at 09:01

## 2018-01-24 RX ADMIN — ENOXAPARIN SODIUM 40 MG: 100 INJECTION SUBCUTANEOUS at 09:00

## 2018-01-24 RX ADMIN — CELECOXIB 200 MG: 200 CAPSULE ORAL at 09:02

## 2018-01-24 RX ADMIN — NYSTATIN: 100000 POWDER TOPICAL at 09:00

## 2018-01-24 RX ADMIN — OXYCODONE HYDROCHLORIDE 5 MG: 5 TABLET ORAL at 06:44

## 2018-01-24 RX ADMIN — DAPTOMYCIN 500 MG: 500 INJECTION, POWDER, LYOPHILIZED, FOR SOLUTION INTRAVENOUS at 16:07

## 2018-01-24 ASSESSMENT — PAIN SCALES - GENERAL
PAINLEVEL_OUTOF10: 3
PAINLEVEL_OUTOF10: 7

## 2018-01-24 NOTE — DISCHARGE PLANNING
Pt has been accepted by [facility] and [facility] gu[facility] for [facility]. Both agencies can initiate care as soon as medically cleared.

## 2018-01-24 NOTE — CARE PLAN
Problem: Safety  Goal: Will remain free from injury  Outcome: PROGRESSING AS EXPECTED  Fall precautions maintained.  Paitent calls for assistance when OOB. Bedside table, urinal, and call light within reach.

## 2018-01-24 NOTE — DISCHARGE PLANNING
Ammy from [facility] will be here to teach pt infusion soon. [facility] will initiate care tomorrow. Meds will be delivered to pt's home.

## 2018-01-24 NOTE — CARE PLAN
Problem: Pain Management  Goal: Pain level will decrease to patient's comfort goal  Outcome: PROGRESSING AS EXPECTED  Pain to R foot; Oxycodone 5 mg PRN Q3 for pain and pain moderately controlled.

## 2018-01-24 NOTE — CARE PLAN
Problem: Knowledge Deficit  Goal: Knowledge of the prescribed therapeutic regimen will improve    Intervention: Discuss information regarding therpeutic regimen and document in education  Went  over POC

## 2018-01-24 NOTE — DISCHARGE SUMMARY
CHIEF COMPLAINT ON ADMISSION  Chief Complaint   Patient presents with   • Toe Pain   • Open Wound       CODE STATUS  Full Code    HPI & HOSPITAL COURSE  This is a 52 y.o. male here with toe pain and drainage from recent amputation. He had it amputation completed on November 10, 2017, he did require 6 weeks antibiotics. Subsequently developed osteomyelitis and failed great toe amputation with metatarsal head osteomyelitis on January 17, 2018. He did have a right foot 1st ray amputation on January 17, 2018 by Dr. Delong. He has tolerated the procedure well. He now has a PICC line placed on January 19 and will clot may require IV antibiotics through the February 17 2018 per ID. Patient has been seen by limb preservation services and is recommended to follow up as an outpatient.    He does have a known history of diabetes. He will need close blood sugar monitoring for wound healing. At this point patient is ambulatory with minimal assistance. He will be discharged to home to finish outpatient antibiotic antibiotic infusion at home.     In the course of his stay he had received Zyvox and Unasyn. He will transitioned to daptomycin on discharge.  He will receive a dose of daptomycin prior to discharge home.      Therefore, he is discharged in good and stable condition with close outpatient follow-up.    SPECIFIC OUTPATIENT FOLLOW-UP  PCP/dc clinic in 1 week  Follow-up with orthopedic surgery as scheduled  Daptomycin through February 17, 2018  Follow-up with LPS    DISCHARGE PROBLEM LIST  Active Problems:    Lower limb amputation, great toe (CMS-HCC) POA: Yes    Type 2 diabetes mellitus (CMS-HCC) POA: Yes    Obesity POA: Yes    Hypertension POA: Yes    GERD (gastroesophageal reflux disease) POA: Yes    HLD (hyperlipidemia) POA: Yes  Resolved Problems:    * No resolved hospital problems. *      FOLLOW UP  No future appointments.  Jerad Lomax M.D.  [address] [place] [id number]  [phone]    Schedule an appointment  as soon as possible for a visit in 1 week  Hospital follow-up appointment with PCP    [place] [address] [address] [facility].  [place] [address]  [phone]          MEDICATIONS ON DISCHARGE   Sebastián Castro   Home Medication Instructions HIRAM:[id number]    Printed on:01/24/18 1212   Medication Information                      acetaminophen (TYLENOL) 325 MG Tab  Take 3 Tabs by mouth every 6 hours as needed.             aspirin EC 81 MG EC tablet  Take 1 Tab by mouth every day.             carvedilol (COREG) 6.25 MG Tab  Take 0.5 Tabs by mouth 2 times a day, with meals.             celecoxib (CELEBREX) 200 MG Cap  Take 1 Cap by mouth 2 Times a Day.             DAPTOmycin (CUBICIN) 500 MG Recon Soln  500 mg by Intravenous route every day for 24 days.             diphenhydrAMINE (BENADRYL) 25 MG capsule  Take 1 Cap by mouth every 6 hours as needed.             famotidine (PEPCID) 20 MG Tab  Take 20 mg by mouth every evening.             lisinopril (PRINIVIL, ZESTRIL) 40 MG tablet  Take 1 Tab by mouth every day.             metformin (GLUCOPHAGE) 500 MG Tab  Take 2 Tabs by mouth 2 times a day, with meals.             nystatin (MYCOSTATIN) powder  Apply to affected area bid             oxycodone immediate-release (ROXICODONE) 5 MG Tab  Take 1 Tab by mouth every 3 hours as needed (Severe Pain (NRS Pain Scale 7-10; CPOT Pain Scale 6-8)) for up to 5 days.             pravastatin (PRAVACHOL) 20 MG Tab  Take 1 Tab by mouth every evening.             senna-docusate (PERICOLACE OR SENOKOT S) 8.6-50 MG Tab  Take 2 Tabs by mouth 2 Times a Day.                 DIET  Orders Placed This Encounter   Procedures   • DIET ORDER     Standing Status:   Standing     Number of Occurrences:   1     Order Specific Question:   Diet:     Answer:   Diabetic [3]       ACTIVITY  As tolerated.  Weight bearing as tolerated         CONSULTATIONS  Orthopedic surgery  ID  Diabetic educator    PROCEDURES  Right foot 1st ray amputation on January 17,  2018 by Dr. Delong    LABORATORY  Lab Results   Component Value Date/Time    SODIUM 136 01/22/2018 05:02 AM    POTASSIUM 3.6 01/22/2018 05:02 AM    CHLORIDE 104 01/22/2018 05:02 AM    CO2 24 01/22/2018 05:02 AM    GLUCOSE 136 (H) 01/22/2018 05:02 AM    BUN 16 01/22/2018 05:02 AM    CREATININE 0.66 01/22/2018 05:02 AM        Lab Results   Component Value Date/Time    WBC 5.0 01/22/2018 05:02 AM    HEMOGLOBIN 13.8 (L) 01/22/2018 05:02 AM    HEMATOCRIT 40.3 (L) 01/22/2018 05:02 AM    PLATELETCT 241 01/22/2018 05:02 AM        Total time of the discharge process exceeds 45 minutes

## 2018-01-25 ENCOUNTER — TELEPHONE (OUTPATIENT)
Dept: INFECTIOUS DISEASES | Facility: MEDICAL CENTER | Age: 53
End: 2018-01-25

## 2018-01-25 ENCOUNTER — HOME CARE VISIT (OUTPATIENT)
Dept: HOME HEALTH SERVICES | Facility: HOME HEALTHCARE | Age: 53
End: 2018-01-25
Payer: MEDICAID

## 2018-01-25 ENCOUNTER — HOSPITAL ENCOUNTER (INPATIENT)
Facility: MEDICAL CENTER | Age: 53
LOS: 4 days | DRG: 309 | End: 2018-01-29
Attending: EMERGENCY MEDICINE | Admitting: HOSPITALIST
Payer: MEDICAID

## 2018-01-25 ENCOUNTER — RESOLUTE PROFESSIONAL BILLING HOSPITAL PROF FEE (OUTPATIENT)
Dept: HOSPITALIST | Facility: MEDICAL CENTER | Age: 53
End: 2018-01-25
Payer: MEDICAID

## 2018-01-25 VITALS
SYSTOLIC BLOOD PRESSURE: 140 MMHG | RESPIRATION RATE: 18 BRPM | TEMPERATURE: 97.9 F | OXYGEN SATURATION: 98 % | DIASTOLIC BLOOD PRESSURE: 70 MMHG | HEART RATE: 77 BPM

## 2018-01-25 DIAGNOSIS — I48.91 RAPID ATRIAL FIBRILLATION (HCC): ICD-10-CM

## 2018-01-25 DIAGNOSIS — I48.91 NEW ONSET ATRIAL FIBRILLATION (HCC): ICD-10-CM

## 2018-01-25 LAB
ALBUMIN SERPL BCP-MCNC: 3.6 G/DL (ref 3.2–4.9)
ALBUMIN/GLOB SERPL: 1.3 G/DL
ALP SERPL-CCNC: 49 U/L (ref 30–99)
ALT SERPL-CCNC: 17 U/L (ref 2–50)
ANION GAP SERPL CALC-SCNC: 11 MMOL/L (ref 0–11.9)
APTT PPP: 30.9 SEC (ref 24.7–36)
AST SERPL-CCNC: 17 U/L (ref 12–45)
BASOPHILS # BLD AUTO: 0.6 % (ref 0–1.8)
BASOPHILS # BLD: 0.04 K/UL (ref 0–0.12)
BILIRUB SERPL-MCNC: 0.4 MG/DL (ref 0.1–1.5)
BNP SERPL-MCNC: 198 PG/ML (ref 0–100)
BUN SERPL-MCNC: 22 MG/DL (ref 8–22)
CALCIUM SERPL-MCNC: 8.9 MG/DL (ref 8.5–10.5)
CHLORIDE SERPL-SCNC: 102 MMOL/L (ref 96–112)
CO2 SERPL-SCNC: 23 MMOL/L (ref 20–33)
CREAT SERPL-MCNC: 0.69 MG/DL (ref 0.5–1.4)
EKG IMPRESSION: NORMAL
EOSINOPHIL # BLD AUTO: 0.47 K/UL (ref 0–0.51)
EOSINOPHIL NFR BLD: 6.5 % (ref 0–6.9)
ERYTHROCYTE [DISTWIDTH] IN BLOOD BY AUTOMATED COUNT: 46.3 FL (ref 35.9–50)
GLOBULIN SER CALC-MCNC: 2.7 G/DL (ref 1.9–3.5)
GLUCOSE SERPL-MCNC: 137 MG/DL (ref 65–99)
HCT VFR BLD AUTO: 38.7 % (ref 42–52)
HGB BLD-MCNC: 12.6 G/DL (ref 14–18)
IMM GRANULOCYTES # BLD AUTO: 0.02 K/UL (ref 0–0.11)
IMM GRANULOCYTES NFR BLD AUTO: 0.3 % (ref 0–0.9)
INR PPP: 1.11 (ref 0.87–1.13)
LYMPHOCYTES # BLD AUTO: 3.02 K/UL (ref 1–4.8)
LYMPHOCYTES NFR BLD: 41.8 % (ref 22–41)
MCH RBC QN AUTO: 27.4 PG (ref 27–33)
MCHC RBC AUTO-ENTMCNC: 32.6 G/DL (ref 33.7–35.3)
MCV RBC AUTO: 84.1 FL (ref 81.4–97.8)
MONOCYTES # BLD AUTO: 0.61 K/UL (ref 0–0.85)
MONOCYTES NFR BLD AUTO: 8.4 % (ref 0–13.4)
NEUTROPHILS # BLD AUTO: 3.07 K/UL (ref 1.82–7.42)
NEUTROPHILS NFR BLD: 42.4 % (ref 44–72)
NRBC # BLD AUTO: 0 K/UL
NRBC BLD-RTO: 0 /100 WBC
PLATELET # BLD AUTO: 233 K/UL (ref 164–446)
PMV BLD AUTO: 9.2 FL (ref 9–12.9)
POTASSIUM SERPL-SCNC: 3.8 MMOL/L (ref 3.6–5.5)
PROT SERPL-MCNC: 6.3 G/DL (ref 6–8.2)
PROTHROMBIN TIME: 14 SEC (ref 12–14.6)
RBC # BLD AUTO: 4.6 M/UL (ref 4.7–6.1)
SODIUM SERPL-SCNC: 136 MMOL/L (ref 135–145)
TROPONIN I SERPL-MCNC: <0.01 NG/ML (ref 0–0.04)
TSH SERPL DL<=0.005 MIU/L-ACNC: 1.29 UIU/ML (ref 0.38–5.33)
WBC # BLD AUTO: 7.2 K/UL (ref 4.8–10.8)

## 2018-01-25 PROCEDURE — 36415 COLL VENOUS BLD VENIPUNCTURE: CPT

## 2018-01-25 PROCEDURE — 80053 COMPREHEN METABOLIC PANEL: CPT

## 2018-01-25 PROCEDURE — 700105 HCHG RX REV CODE 258: Performed by: EMERGENCY MEDICINE

## 2018-01-25 PROCEDURE — A9270 NON-COVERED ITEM OR SERVICE: HCPCS | Performed by: HOSPITALIST

## 2018-01-25 PROCEDURE — 84443 ASSAY THYROID STIM HORMONE: CPT

## 2018-01-25 PROCEDURE — 85610 PROTHROMBIN TIME: CPT

## 2018-01-25 PROCEDURE — 93005 ELECTROCARDIOGRAM TRACING: CPT | Performed by: EMERGENCY MEDICINE

## 2018-01-25 PROCEDURE — 85730 THROMBOPLASTIN TIME PARTIAL: CPT

## 2018-01-25 PROCEDURE — 96376 TX/PRO/DX INJ SAME DRUG ADON: CPT

## 2018-01-25 PROCEDURE — 99223 1ST HOSP IP/OBS HIGH 75: CPT | Performed by: HOSPITALIST

## 2018-01-25 PROCEDURE — 96374 THER/PROPH/DIAG INJ IV PUSH: CPT

## 2018-01-25 PROCEDURE — G0493 RN CARE EA 15 MIN HH/HOSPICE: HCPCS

## 2018-01-25 PROCEDURE — 700102 HCHG RX REV CODE 250 W/ 637 OVERRIDE(OP): Performed by: HOSPITALIST

## 2018-01-25 PROCEDURE — 99285 EMERGENCY DEPT VISIT HI MDM: CPT

## 2018-01-25 PROCEDURE — 85025 COMPLETE CBC W/AUTO DIFF WBC: CPT

## 2018-01-25 PROCEDURE — 84484 ASSAY OF TROPONIN QUANT: CPT

## 2018-01-25 PROCEDURE — 700111 HCHG RX REV CODE 636 W/ 250 OVERRIDE (IP): Performed by: HOSPITALIST

## 2018-01-25 PROCEDURE — 770020 HCHG ROOM/CARE - TELE (206)

## 2018-01-25 PROCEDURE — 700111 HCHG RX REV CODE 636 W/ 250 OVERRIDE (IP): Performed by: EMERGENCY MEDICINE

## 2018-01-25 PROCEDURE — 83880 ASSAY OF NATRIURETIC PEPTIDE: CPT

## 2018-01-25 RX ORDER — PRAVASTATIN SODIUM 20 MG
20 TABLET ORAL EVERY EVENING
Status: DISCONTINUED | OUTPATIENT
Start: 2018-01-25 | End: 2018-01-29 | Stop reason: HOSPADM

## 2018-01-25 RX ORDER — OXYCODONE HYDROCHLORIDE 5 MG/1
5 TABLET ORAL
Status: DISCONTINUED | OUTPATIENT
Start: 2018-01-25 | End: 2018-01-29 | Stop reason: HOSPADM

## 2018-01-25 RX ORDER — ONDANSETRON 2 MG/ML
4 INJECTION INTRAMUSCULAR; INTRAVENOUS EVERY 4 HOURS PRN
Status: DISCONTINUED | OUTPATIENT
Start: 2018-01-25 | End: 2018-01-29 | Stop reason: HOSPADM

## 2018-01-25 RX ORDER — SODIUM CHLORIDE 9 MG/ML
1000 INJECTION, SOLUTION INTRAVENOUS ONCE
Status: COMPLETED | OUTPATIENT
Start: 2018-01-25 | End: 2018-01-25

## 2018-01-25 RX ORDER — LISINOPRIL 20 MG/1
40 TABLET ORAL DAILY
Status: DISCONTINUED | OUTPATIENT
Start: 2018-01-26 | End: 2018-01-29 | Stop reason: HOSPADM

## 2018-01-25 RX ORDER — ONDANSETRON 4 MG/1
4 TABLET, ORALLY DISINTEGRATING ORAL EVERY 4 HOURS PRN
Status: DISCONTINUED | OUTPATIENT
Start: 2018-01-25 | End: 2018-01-29 | Stop reason: HOSPADM

## 2018-01-25 RX ORDER — BISACODYL 10 MG
10 SUPPOSITORY, RECTAL RECTAL
Status: DISCONTINUED | OUTPATIENT
Start: 2018-01-25 | End: 2018-01-29 | Stop reason: HOSPADM

## 2018-01-25 RX ORDER — HEPARIN SODIUM 5000 [USP'U]/ML
5000 INJECTION, SOLUTION INTRAVENOUS; SUBCUTANEOUS EVERY 8 HOURS
Status: DISCONTINUED | OUTPATIENT
Start: 2018-01-25 | End: 2018-01-26

## 2018-01-25 RX ORDER — POLYETHYLENE GLYCOL 3350 17 G/17G
1 POWDER, FOR SOLUTION ORAL
Status: DISCONTINUED | OUTPATIENT
Start: 2018-01-25 | End: 2018-01-29 | Stop reason: HOSPADM

## 2018-01-25 RX ORDER — DILTIAZEM HYDROCHLORIDE 5 MG/ML
20 INJECTION INTRAVENOUS ONCE
Status: COMPLETED | OUTPATIENT
Start: 2018-01-25 | End: 2018-01-25

## 2018-01-25 RX ORDER — DILTIAZEM HYDROCHLORIDE 5 MG/ML
20 INJECTION INTRAVENOUS
Status: COMPLETED | OUTPATIENT
Start: 2018-01-25 | End: 2018-01-25

## 2018-01-25 RX ORDER — DEXTROSE MONOHYDRATE 25 G/50ML
25 INJECTION, SOLUTION INTRAVENOUS
Status: DISCONTINUED | OUTPATIENT
Start: 2018-01-25 | End: 2018-01-29 | Stop reason: HOSPADM

## 2018-01-25 RX ORDER — ACETAMINOPHEN 325 MG/1
650 TABLET ORAL EVERY 6 HOURS PRN
Status: DISCONTINUED | OUTPATIENT
Start: 2018-01-25 | End: 2018-01-29 | Stop reason: HOSPADM

## 2018-01-25 RX ORDER — PROMETHAZINE HYDROCHLORIDE 25 MG/1
12.5-25 TABLET ORAL EVERY 4 HOURS PRN
Status: DISCONTINUED | OUTPATIENT
Start: 2018-01-25 | End: 2018-01-29 | Stop reason: HOSPADM

## 2018-01-25 RX ORDER — DAPTOMYCIN 50 MG/ML
500 INJECTION, POWDER, LYOPHILIZED, FOR SOLUTION INTRAVENOUS DAILY
Status: DISCONTINUED | OUTPATIENT
Start: 2018-01-26 | End: 2018-01-26

## 2018-01-25 RX ORDER — AMOXICILLIN 250 MG
2 CAPSULE ORAL 2 TIMES DAILY
Status: DISCONTINUED | OUTPATIENT
Start: 2018-01-25 | End: 2018-01-29 | Stop reason: HOSPADM

## 2018-01-25 RX ORDER — PROMETHAZINE HYDROCHLORIDE 12.5 MG/1
12.5-25 SUPPOSITORY RECTAL EVERY 4 HOURS PRN
Status: DISCONTINUED | OUTPATIENT
Start: 2018-01-25 | End: 2018-01-29 | Stop reason: HOSPADM

## 2018-01-25 RX ADMIN — DILTIAZEM HYDROCHLORIDE 20 MG: 5 INJECTION INTRAVENOUS at 18:22

## 2018-01-25 RX ADMIN — DILTIAZEM HYDROCHLORIDE 20 MG: 5 INJECTION INTRAVENOUS at 20:12

## 2018-01-25 RX ADMIN — METOPROLOL TARTRATE 12.5 MG: 25 TABLET, FILM COATED ORAL at 23:55

## 2018-01-25 RX ADMIN — SODIUM CHLORIDE 1000 ML: 9 INJECTION, SOLUTION INTRAVENOUS at 18:22

## 2018-01-25 RX ADMIN — OXYCODONE HYDROCHLORIDE 5 MG: 5 TABLET ORAL at 23:55

## 2018-01-25 RX ADMIN — HEPARIN SODIUM 5000 UNITS: 5000 INJECTION, SOLUTION INTRAVENOUS; SUBCUTANEOUS at 23:55

## 2018-01-25 SDOH — ECONOMIC STABILITY: HOUSING INSECURITY: UNSAFE APPLIANCES: 0

## 2018-01-25 SDOH — ECONOMIC STABILITY: HOUSING INSECURITY: UNSAFE COOKING RANGE AREA: 0

## 2018-01-25 ASSESSMENT — ACTIVITIES OF DAILY LIVING (ADL)
HOME_HEALTH_OASIS: 01
OASIS_M1830: 03

## 2018-01-25 ASSESSMENT — ENCOUNTER SYMPTOMS
VOMITING: DENIES
DEBILITATING PAIN: 1
NAUSEA: DENIES
DEPRESSED MOOD: 1

## 2018-01-25 ASSESSMENT — PAIN SCALES - GENERAL
PAINLEVEL_OUTOF10: 0
PAINLEVEL_OUTOF10: 8

## 2018-01-25 ASSESSMENT — PATIENT HEALTH QUESTIONNAIRE - PHQ9
2. FEELING DOWN, DEPRESSED, IRRITABLE, OR HOPELESS: 00
1. LITTLE INTEREST OR PLEASURE IN DOING THINGS: 00

## 2018-01-25 NOTE — DISCHARGE PLANNING
[facility] transport scheduled with [facility]. Patient will be leaving today @1900 via [facility] transport going home. Loree stated that [facility] will contact the nurses station when transport is set. RN BRANDON Sellers notified.

## 2018-01-25 NOTE — DISCHARGE INSTRUCTIONS
"PICC Home Guide  A peripherally inserted central catheter (PICC) is a long, thin, flexible tube that is inserted into a vein in the upper arm. It is a form of intravenous (IV) access. It is considered to be a \"central\" line because the tip of the PICC ends in a large vein in your chest. This large vein is called the superior vena cava (SVC). The PICC tip ends in the SVC because there is a lot of blood flow in the SVC. This allows medicines and IV fluids to be quickly distributed throughout the body. The PICC is inserted using a sterile technique by a specially trained nurse or physician. After the PICC is inserted, a chest X-ray exam is done to be sure it is in the correct place.   A PICC may be placed for different reasons, such as:  · To give medicines and liquid nutrition that can only be given through a central line. Examples are:  ¨ Certain antibiotic treatments.  ¨ Chemotherapy.  ¨ Total parenteral nutrition (TPN).  · To take frequent blood samples.  · To give IV fluids and blood products.  · If there is difficulty placing a peripheral intravenous (PIV) catheter.  If taken care of properly, a PICC can remain in place for several months. A PICC can also allow a person to go home from the hospital early. Medicine and PICC care can be managed at home by a family member or home health care team.  WHAT PROBLEMS CAN HAPPEN WHEN I HAVE A PICC?  Problems with a PICC can occasionally occur. These may include the following:  · A blood clot (thrombus) forming in or at the tip of the PICC. This can cause the PICC to become clogged. A clot-dissolving medicine called tissue plasminogen activator (tPA) can be given through the PICC to help break up the clot.  · Inflammation of the vein (phlebitis) in which the PICC is placed. Signs of inflammation may include redness, pain at the insertion site, red streaks, or being able to feel a \"cord\" in the vein where the PICC is located.  · Infection in the PICC or at the insertion " site. Signs of infection may include fever, chills, redness, swelling, or pus drainage from the PICC insertion site.  · PICC movement (malposition). The PICC tip may move from its original position due to excessive physical activity, forceful coughing, sneezing, or vomiting.  · A break or cut in the PICC. It is important to not use scissors near the PICC.  · Nerve or tendon irritation or injury during PICC insertion.  WHAT SHOULD I KEEP IN MIND ABOUT ACTIVITIES WHEN I HAVE A PICC?  · You may bend your arm and move it freely. If your PICC is near or at the bend of your elbow, avoid activity with repeated motion at the elbow.  · Rest at home for the remainder of the day following PICC line insertion.  · Avoid lifting heavy objects as instructed by your health care provider.  · Avoid using a crutch with the arm on the same side as your PICC. You may need to use a walker.  WHAT SHOULD I KNOW ABOUT MY PICC DRESSING?  · Keep your PICC bandage (dressing) clean and dry to prevent infection.  ¨ Ask your health care provider when you may shower. Ask your health care provider to teach you how to wrap the PICC when you do take a shower.  · Change the PICC dressing as instructed by your health care provider.  · Change your PICC dressing if it becomes loose or wet.  WHAT SHOULD I KNOW ABOUT PICC CARE?  · Check the PICC insertion site daily for leakage, redness, swelling, or pain.  · Do not take a bath, swim, or use hot tubs when you have a PICC. Cover PICC line with clear plastic wrap and tape to keep it dry while showering.  · Flush the PICC as directed by your health care provider. Let your health care provider know right away if the PICC is difficult to flush or does not flush. Do not use force to flush the PICC.  · Do not use a syringe that is less than 10 mL to flush the PICC.  · Never pull or tug on the PICC.  · Avoid blood pressure checks on the arm with the PICC.  · Keep your PICC identification card with you at all  "times.  · Do not take the PICC out yourself. Only a trained clinical professional should remove the PICC.  SEEK IMMEDIATE MEDICAL CARE IF:  · Your PICC is accidentally pulled all the way out. If this happens, cover the insertion site with a bandage or gauze dressing. Do not throw the PICC away. Your health care provider will need to inspect it.  · Your PICC was tugged or pulled and has partially come out. Do not  push the PICC back in.  · There is any type of drainage, redness, or swelling where the PICC enters the skin.  · You cannot flush the PICC, it is difficult to flush, or the PICC leaks around the insertion site when it is flushed.  · You hear a \"flushing\" sound when the PICC is flushed.  · You have pain, discomfort, or numbness in your arm, shoulder, or jaw on the same side as the PICC.  · You feel your heart \"racing\" or skipping beats.  · You notice a hole or tear in the PICC.  · You develop chills or a fever.  MAKE SURE YOU:   · Understand these instructions.  · Will watch your condition.  · Will get help right away if you are not doing well or get worse.     This information is not intended to replace advice given to you by your health care provider. Make sure you discuss any questions you have with your health care provider.     Document Released: 06/23/2004 Document Revised: 01/08/2016 Document Reviewed: 08/25/2014  [employer] Interactive Patient Education ©2016 [employer]      Discharge Instructions    Discharged to home by taxi with escort. Discharged via wheelchair, hospital escort: Yes.  Special equipment needed: Walker    Be sure to schedule a follow-up appointment with your primary care doctor or any specialists as instructed.     Discharge Plan:   Diet Plan: Discussed  Activity Level: Discussed  Confirmed Follow up Appointment: Patient to Call and Schedule Appointment  Confirmed Symptoms Management: Discussed  Medication Reconciliation Updated: Yes  Influenza Vaccine Indication: Not indicated: " Previously immunized this influenza season and > 8 years of age    I understand that a diet low in cholesterol, fat, and sodium is recommended for good health. Unless I have been given specific instructions below for another diet, I accept this instruction as my diet prescription.   Other diet: Diabetic diet    Special Instructions: Discharge instructions for the Orthopedic Patient    Follow up with Primary Care Physician within 2 weeks of discharge to home, regarding:  Review of medications and diagnostic testing.  Surveillance for medical complications.  Workup and treatment of osteoporosis, if appropriate.     -Is this a Joint Replacement patient? No    -Is this patient being discharged with medication to prevent blood clots?  Yes, Aspirin Aspirin, ASA oral tablets  What is this medicine?  ASPIRIN (AS pir in) is a pain reliever. It is used to treat mild pain and fever. This medicine is also used as directed by a doctor to prevent and to treat heart attacks, to prevent strokes, and to treat arthritis or inflammation.  This medicine may be used for other purposes; ask your health care provider or pharmacist if you have questions.  COMMON BRAND NAME(S): Aspir-Low, Aspir-Alejandrina , Aspirtab , TeleDNA Advanced Aspirin, TeleDNA Aspirin Extra Strength, TeleDNA Aspirin Plus, TeleDNA Aspirin, TeleDNA Genuine Aspirin, TeleDNA Womens Aspirin , Bufferin Extra Strength, Bufferin Low Dose, Bufferin  What should I tell my health care provider before I take this medicine?  They need to know if you have any of these conditions:  -anemia  -asthma  -bleeding problems  -child with chickenpox, the flu, or other viral infection  -diabetes  -gout  -if you frequently drink alcohol containing drinks  -kidney disease  -liver disease  -low level of vitamin K  -lupus  -smoke tobacco  -stomach ulcers or other problems  -an unusual or allergic reaction to aspirin, tartrazine dye, other medicines, dyes, or preservatives  -pregnant or trying to get  pregnant  -breast-feeding  How should I use this medicine?  Take this medicine by mouth with a glass of water. Follow the directions on the package or prescription label. You can take this medicine with or without food. If it upsets your stomach, take it with food. Do not take your medicine more often than directed.  Talk to your pediatrician regarding the use of this medicine in children. While this drug may be prescribed for children as young as 12 years of age for selected conditions, precautions do apply. Children and teenagers should not use this medicine to treat chicken pox or flu symptoms unless directed by a doctor.  Patients over 65 years old may have a stronger reaction and need a smaller dose.  Overdosage: If you think you have taken too much of this medicine contact a poison control center or emergency room at once.  NOTE: This medicine is only for you. Do not share this medicine with others.  What if I miss a dose?  If you are taking this medicine on a regular schedule and miss a dose, take it as soon as you can. If it is almost time for your next dose, take only that dose. Do not take double or extra doses.  What may interact with this medicine?  Do not take this medicine with any of the following medications:  -cidofovir  -ketorolac  -probenecid  This medicine may also interact with the following medications:  -alcohol  -alendronate  -bismuth subsalicylate  -flavocoxid  -herbal supplements like feverfew, garlic, zaheer, ginkgo biloba, horse chestnut  -medicines for diabetes or glaucoma like acetazolamide, methazolamide  -medicines for gout  -medicines that treat or prevent blood clots like enoxaparin, heparin, ticlopidine, warfarin  -other aspirin and aspirin-like medicines  -NSAIDs, medicines for pain and inflammation, like ibuprofen or naproxen  -pemetrexed  -sulfinpyrazone  -varicella live vaccine  This list may not describe all possible interactions. Give your health care provider a list of all  the medicines, herbs, non-prescription drugs, or dietary supplements you use. Also tell them if you smoke, drink alcohol, or use illegal drugs. Some items may interact with your medicine.  What should I watch for while using this medicine?  If you are treating yourself for pain, tell your doctor or health care professional if the pain lasts more than 10 days, if it gets worse, or if there is a new or different kind of pain. Tell your doctor if you see redness or swelling. Also, check with your doctor if you have a fever that lasts for more than 3 days. Only take this medicine to prevent heart attacks or blood clotting if prescribed by your doctor or health care professional.  Do not take aspirin or aspirin-like medicines with this medicine. Too much aspirin can be dangerous. Always read the labels carefully.  This medicine can irritate your stomach or cause bleeding problems. Do not smoke cigarettes or drink alcohol while taking this medicine. Do not lie down for 30 minutes after taking this medicine to prevent irritation to your throat.  If you are scheduled for any medical or dental procedure, tell your healthcare provider that you are taking this medicine. You may need to stop taking this medicine before the procedure.  What side effects may I notice from receiving this medicine?  Side effects that you should report to your doctor or health care professional as soon as possible:  -allergic reactions like skin rash, itching or hives, swelling of the face, lips, or tongue  -black, tarry stools  -bloody, coffee ground-like vomit  -breathing problems  -changes in hearing, ringing in the ears  -confusion  -general ill feeling or flu-like symptoms  -pain on swallowing  -redness, blistering, peeling or loosening of the skin, including inside the mouth or nose  -trouble passing urine or change in the amount of urine  -unusual bleeding or bruising  -unusually weak or tired  -yellowing of the eyes or skin  Side effects  that usually do not require medical attention (report to your doctor or health care professional if they continue or are bothersome):  -diarrhea or constipation  -nausea, vomiting  -stomach gas, heartburn  This list may not describe all possible side effects. Call your doctor for medical advice about side effects. You may report side effects to FDA at 0-074-KNE-3755.  Where should I keep my medicine?  Keep out of the reach of children.  Store at room temperature between 15 and 30 degrees C (59 and 86 degrees F). Protect from heat and moisture. Do not use this medicine if it has a strong vinegar smell. Throw away any unused medicine after the expiration date.  NOTE: This sheet is a summary. It may not cover all possible information. If you have questions about this medicine, talk to your doctor, pharmacist, or health care provider.  © 2014, [employer]. (3/10/2009 10:44:17 AM)      · Is patient discharged on Warfarin / Coumadin?   No     Depression / Suicide Risk    As you are discharged from this [facility] facility, it is important to learn how to keep safe from harming yourself.    Recognize the warning signs:  · Abrupt changes in personality, positive or negative- including increase in energy   · Giving away possessions  · Change in eating patterns- significant weight changes-  positive or negative  · Change in sleeping patterns- unable to sleep or sleeping all the time   · Unwillingness or inability to communicate  · Depression  · Unusual sadness, discouragement and loneliness  · Talk of wanting to die  · Neglect of personal appearance   · Rebelliousness- reckless behavior  · Withdrawal from people/activities they love  · Confusion- inability to concentrate     If you or a loved one observes any of these behaviors or has concerns about self-harm, here's what you can do:  · Talk about it- your feelings and reasons for harming yourself  · Remove any means that you might use to hurt yourself  (examples: pills, rope, extension cords, firearm)  · Get professional help from the community (Mental Health, Substance Abuse, psychological counseling)  · Do not be alone:Call your Safe Contact- someone whom you trust who will be there for you.  · Call your local CRISIS HOTLINE 656-0445 or 719-947-3935  · Call your local Children's Mobile Crisis Response Team [place] (960) 714-3975 or www.[employer]  · Call the toll free National Suicide Prevention Hotlines   · National Suicide Prevention Lifeline 100-199-ZFDT (0023)  · National Hope Line Network 800-SUICIDE (340-4171)      DC to home with home health   F/u with pcp in 1 week   Daptomycin per ID

## 2018-01-25 NOTE — TELEPHONE ENCOUNTER
[facility] RN is calling and stating that pt's PICC line is clotted off and he looks like he is having a reaction to the Daptomycin. Spoke with Roselyn MEJIA and she has instructed the pt to go to the ER immediately to be evaluated. Home Health RN is sending pt to the ER now. MATHIEU

## 2018-01-25 NOTE — DISCHARGE PLANNING
Pt has received first dose of Daptomycin. Needs ride home. Will ask after-hours SW to arrange Uber for ride home. [facility] will deliver meds to pt's home tomorrow am between [id number]. [facility] will see pt 1/25 for IV Daptomycin.

## 2018-01-26 ENCOUNTER — APPOINTMENT (OUTPATIENT)
Dept: RADIOLOGY | Facility: MEDICAL CENTER | Age: 53
DRG: 309 | End: 2018-01-26
Attending: HOSPITALIST
Payer: MEDICAID

## 2018-01-26 ENCOUNTER — HOME CARE VISIT (OUTPATIENT)
Dept: HOME HEALTH SERVICES | Facility: HOME HEALTHCARE | Age: 53
End: 2018-01-26
Payer: MEDICAID

## 2018-01-26 LAB
ANION GAP SERPL CALC-SCNC: 10 MMOL/L (ref 0–11.9)
BUN SERPL-MCNC: 18 MG/DL (ref 8–22)
CALCIUM SERPL-MCNC: 8.8 MG/DL (ref 8.5–10.5)
CHLORIDE SERPL-SCNC: 106 MMOL/L (ref 96–112)
CO2 SERPL-SCNC: 24 MMOL/L (ref 20–33)
CREAT SERPL-MCNC: 0.53 MG/DL (ref 0.5–1.4)
GLUCOSE BLD-MCNC: 133 MG/DL (ref 65–99)
GLUCOSE BLD-MCNC: 141 MG/DL (ref 65–99)
GLUCOSE BLD-MCNC: 164 MG/DL (ref 65–99)
GLUCOSE BLD-MCNC: 165 MG/DL (ref 65–99)
GLUCOSE SERPL-MCNC: 142 MG/DL (ref 65–99)
POTASSIUM SERPL-SCNC: 3.5 MMOL/L (ref 3.6–5.5)
SODIUM SERPL-SCNC: 140 MMOL/L (ref 135–145)

## 2018-01-26 PROCEDURE — A9270 NON-COVERED ITEM OR SERVICE: HCPCS | Performed by: INTERNAL MEDICINE

## 2018-01-26 PROCEDURE — 36415 COLL VENOUS BLD VENIPUNCTURE: CPT

## 2018-01-26 PROCEDURE — 99232 SBSQ HOSP IP/OBS MODERATE 35: CPT | Performed by: INTERNAL MEDICINE

## 2018-01-26 PROCEDURE — 82962 GLUCOSE BLOOD TEST: CPT | Mod: 91

## 2018-01-26 PROCEDURE — 700111 HCHG RX REV CODE 636 W/ 250 OVERRIDE (IP): Performed by: FAMILY MEDICINE

## 2018-01-26 PROCEDURE — 700102 HCHG RX REV CODE 250 W/ 637 OVERRIDE(OP): Performed by: HOSPITALIST

## 2018-01-26 PROCEDURE — 80048 BASIC METABOLIC PNL TOTAL CA: CPT

## 2018-01-26 PROCEDURE — 770020 HCHG ROOM/CARE - TELE (206)

## 2018-01-26 PROCEDURE — 71045 X-RAY EXAM CHEST 1 VIEW: CPT

## 2018-01-26 PROCEDURE — 700101 HCHG RX REV CODE 250: Performed by: INTERNAL MEDICINE

## 2018-01-26 PROCEDURE — A9270 NON-COVERED ITEM OR SERVICE: HCPCS | Performed by: HOSPITALIST

## 2018-01-26 PROCEDURE — 700102 HCHG RX REV CODE 250 W/ 637 OVERRIDE(OP): Performed by: INTERNAL MEDICINE

## 2018-01-26 PROCEDURE — 700111 HCHG RX REV CODE 636 W/ 250 OVERRIDE (IP): Performed by: HOSPITALIST

## 2018-01-26 RX ORDER — LINEZOLID 600 MG/1
600 TABLET, FILM COATED ORAL EVERY 12 HOURS
Status: DISCONTINUED | OUTPATIENT
Start: 2018-01-26 | End: 2018-01-29 | Stop reason: HOSPADM

## 2018-01-26 RX ORDER — POTASSIUM CHLORIDE 20 MEQ/1
20 TABLET, EXTENDED RELEASE ORAL ONCE
Status: COMPLETED | OUTPATIENT
Start: 2018-01-26 | End: 2018-01-26

## 2018-01-26 RX ORDER — METOPROLOL TARTRATE 1 MG/ML
5 INJECTION, SOLUTION INTRAVENOUS
Status: DISCONTINUED | OUTPATIENT
Start: 2018-01-26 | End: 2018-01-29 | Stop reason: HOSPADM

## 2018-01-26 RX ORDER — NYSTATIN 100000 [USP'U]/G
POWDER TOPICAL 2 TIMES DAILY
Status: DISCONTINUED | OUTPATIENT
Start: 2018-01-26 | End: 2018-01-29 | Stop reason: HOSPADM

## 2018-01-26 RX ORDER — METOPROLOL TARTRATE 50 MG/1
50 TABLET, FILM COATED ORAL TWICE DAILY
Status: DISCONTINUED | OUTPATIENT
Start: 2018-01-26 | End: 2018-01-29 | Stop reason: HOSPADM

## 2018-01-26 RX ORDER — DIPHENHYDRAMINE HYDROCHLORIDE 50 MG/ML
25 INJECTION INTRAMUSCULAR; INTRAVENOUS EVERY 6 HOURS PRN
Status: DISCONTINUED | OUTPATIENT
Start: 2018-01-26 | End: 2018-01-29 | Stop reason: HOSPADM

## 2018-01-26 RX ADMIN — METFORMIN HYDROCHLORIDE 1000 MG: 500 TABLET, FILM COATED ORAL at 17:50

## 2018-01-26 RX ADMIN — DIPHENHYDRAMINE HYDROCHLORIDE 25 MG: 50 INJECTION, SOLUTION INTRAMUSCULAR; INTRAVENOUS at 20:30

## 2018-01-26 RX ADMIN — METOPROLOL TARTRATE 50 MG: 50 TABLET, FILM COATED ORAL at 20:31

## 2018-01-26 RX ADMIN — METFORMIN HYDROCHLORIDE 1000 MG: 500 TABLET, FILM COATED ORAL at 11:55

## 2018-01-26 RX ADMIN — LINEZOLID 600 MG: 600 TABLET, FILM COATED ORAL at 20:31

## 2018-01-26 RX ADMIN — LINEZOLID 600 MG: 600 TABLET, FILM COATED ORAL at 11:55

## 2018-01-26 RX ADMIN — METOPROLOL TARTRATE 12.5 MG: 25 TABLET, FILM COATED ORAL at 09:34

## 2018-01-26 RX ADMIN — RIVAROXABAN 20 MG: 20 TABLET, FILM COATED ORAL at 17:50

## 2018-01-26 RX ADMIN — INSULIN HUMAN 2 UNITS: 100 INJECTION, SOLUTION PARENTERAL at 17:57

## 2018-01-26 RX ADMIN — ASPIRIN 81 MG: 81 TABLET, COATED ORAL at 09:35

## 2018-01-26 RX ADMIN — POTASSIUM CHLORIDE 20 MEQ: 1500 TABLET, EXTENDED RELEASE ORAL at 11:55

## 2018-01-26 RX ADMIN — LISINOPRIL 40 MG: 20 TABLET ORAL at 09:34

## 2018-01-26 RX ADMIN — OXYCODONE HYDROCHLORIDE 5 MG: 5 TABLET ORAL at 20:31

## 2018-01-26 RX ADMIN — HEPARIN SODIUM 5000 UNITS: 5000 INJECTION, SOLUTION INTRAVENOUS; SUBCUTANEOUS at 08:00

## 2018-01-26 RX ADMIN — DIPHENHYDRAMINE HYDROCHLORIDE 25 MG: 50 INJECTION, SOLUTION INTRAMUSCULAR; INTRAVENOUS at 06:18

## 2018-01-26 RX ADMIN — PRAVASTATIN SODIUM 20 MG: 20 TABLET ORAL at 09:34

## 2018-01-26 RX ADMIN — METOPROLOL TARTRATE 5 MG: 5 INJECTION, SOLUTION INTRAVENOUS at 15:25

## 2018-01-26 RX ADMIN — INSULIN HUMAN 2 UNITS: 100 INJECTION, SOLUTION PARENTERAL at 12:08

## 2018-01-26 RX ADMIN — NYSTATIN 1500000 UNITS: 100000 POWDER TOPICAL at 20:29

## 2018-01-26 ASSESSMENT — PATIENT HEALTH QUESTIONNAIRE - PHQ9
SUM OF ALL RESPONSES TO PHQ QUESTIONS 1-9: 0
2. FEELING DOWN, DEPRESSED, IRRITABLE, OR HOPELESS: NOT AT ALL
1. LITTLE INTEREST OR PLEASURE IN DOING THINGS: NOT AT ALL
SUM OF ALL RESPONSES TO PHQ9 QUESTIONS 1 AND 2: 0

## 2018-01-26 ASSESSMENT — ENCOUNTER SYMPTOMS
DIZZINESS: 0
HEARTBURN: 0
PALPITATIONS: 1
COUGH: 0
FEVER: 0
BLURRED VISION: 0
MYALGIAS: 0
DEPRESSION: 0

## 2018-01-26 ASSESSMENT — COGNITIVE AND FUNCTIONAL STATUS - GENERAL
SUGGESTED CMS G CODE MODIFIER DAILY ACTIVITY: CH
SUGGESTED CMS G CODE MODIFIER MOBILITY: CI
DAILY ACTIVITIY SCORE: 24
MOBILITY SCORE: 23
WALKING IN HOSPITAL ROOM: A LITTLE

## 2018-01-26 ASSESSMENT — PAIN SCALES - GENERAL
PAINLEVEL_OUTOF10: 2
PAINLEVEL_OUTOF10: 2
PAINLEVEL_OUTOF10: 3
PAINLEVEL_OUTOF10: 7
PAINLEVEL_OUTOF10: 2
PAINLEVEL_OUTOF10: 3
PAINLEVEL_OUTOF10: 0

## 2018-01-26 ASSESSMENT — LIFESTYLE VARIABLES
ALCOHOL_USE: NO
EVER_SMOKED: NEVER

## 2018-01-26 NOTE — PROGRESS NOTES
Consulted Dr. Briggs re: PICC line slow to flush and unable to pull back. Order of Alteplase given X2 if second is needed

## 2018-01-26 NOTE — ED PROVIDER NOTES
ED Provider Note    CHIEF COMPLAINT  Chief Complaint   Patient presents with   • Other     red skin on chest after antibiotic   • Rapid Heart Beat     pulse 140s.  a-fib.  no history of a-fib   • Vascular Access Problem     picc line will not draw       HPI  Sebastián Castro is a 52 y.o. male who presents for evaluation of palpitations that apparently began today, EMS found him to be in a rapid atrial fibrillation no history of atrial fibrillation. The patient also presents with concerns over a malfunctioning PICC line, yesterday he was discharged from the hospital with a PICC line for daptomycin infusions with osteomyelitis of his right foot status post amputation. He is diabetic. He states that he has no history of atrial fibrillation, he has not had chest pain or shortness of breath. No abdominal pain or vomiting and no fever. Apparently there was issue with drawing blood from his PICC line although the antibiotic infusion was successful.    REVIEW OF SYSTEMS  Negative for fever, rash, chest pain, dyspnea, abdominal pain, nausea, vomiting, diarrhea, headache, focal weakness, focal numbness, focal tingling, back pain. All other systems are negative.     PAST MEDICAL HISTORY  Past Medical History:   Diagnosis Date   • Diabetes (CMS-HCC)    • Hypertension        FAMILY HISTORY  No family history on file.    SOCIAL HISTORY  Social History   Substance Use Topics   • Smoking status: Never Smoker   • Smokeless tobacco: Never Used   • Alcohol use No       SURGICAL HISTORY  Past Surgical History:   Procedure Laterality Date   • TOE AMPUTATION Right 1/17/2018    Procedure: TOE AMPUTATION-1ST RAY;  Surgeon: Doug Delong M.D.;  Location: SURGERY [facility];  Service: Orthopedics   • TOE AMPUTATION Right 11/10/2017    Procedure: TOE AMPUTATION;  Surgeon: Osorio Leo M.D.;  Location: SURGERY [facility];  Service: Orthopedics       CURRENT MEDICATIONS  I personally reviewed the medication list in the charting  documentation.     ALLERGIES  Allergies   Allergen Reactions   • Pcn [Penicillins] Hives and Rash      Rash & hives  Tolerating Ampicillin + Sulbactam 11/12/17       MEDICAL RECORD  I have reviewed patient's medical record and pertinent results are listed above.      PHYSICAL EXAM  VITAL SIGNS: /101   Pulse (!) 140   Temp 36.8 °C (98.3 °F)   Resp 18   SpO2 96%    Constitutional: Chronically ill-appearing but in no acute distress and not toxically ill  HENT: Mucus membranes moist.    Eyes: No scleral icterus. Normal conjunctiva   Neck: Supple, comfortable, nonpainful range of motion.   Cardiovascular: Irregularly irregular and tachycardic.   Thorax & Lungs: Chest is nontender.  Lungs are clear to auscultation with good air movement bilaterally.  No wheeze, rhonchi, nor rales.   Abdomen: Soft, with no tenderness, rebound nor guarding.  No mass or pulsatile mass appreciated.  Skin: Warm, dry. No rash appreciated  Extremities/Musculoskeletal: Right lower extremity has a boot and dressing  Neurologic: Alert & oriented. No focal deficits observed.   Psychiatric: Normal affect appropriate for the clinical situation.    DIAGNOSTIC STUDIES / PROCEDURES    EKG  12 Lead EKG interpreted by me to show:    Rate 128  Rhythm: Atrial fibrillation  Axis: Normal  QRS Intervals: Normal  T waves: No acute changes  ST segments: No acute changes  Ectopy: None.    My impression of this EKG: Atrial fibrillation with no evidence of ischemia      LABS  Results for orders placed or performed during the hospital encounter of 01/25/18   CBC w/ Differential   Result Value Ref Range    WBC 7.2 4.8 - 10.8 K/uL    RBC 4.60 (L) 4.70 - 6.10 M/uL    Hemoglobin 12.6 (L) 14.0 - 18.0 g/dL    Hematocrit 38.7 (L) 42.0 - 52.0 %    MCV 84.1 81.4 - 97.8 fL    MCH 27.4 27.0 - 33.0 pg    MCHC 32.6 (L) 33.7 - 35.3 g/dL    RDW 46.3 35.9 - 50.0 fL    Platelet Count 233 164 - 446 K/uL    MPV 9.2 9.0 - 12.9 fL    Neutrophils-Polys 42.40 (L) 44.00 - 72.00 %     Lymphocytes 41.80 (H) 22.00 - 41.00 %    Monocytes 8.40 0.00 - 13.40 %    Eosinophils 6.50 0.00 - 6.90 %    Basophils 0.60 0.00 - 1.80 %    Immature Granulocytes 0.30 0.00 - 0.90 %    Nucleated RBC 0.00 /100 WBC    Neutrophils (Absolute) 3.07 1.82 - 7.42 K/uL    Lymphs (Absolute) 3.02 1.00 - 4.80 K/uL    Monos (Absolute) 0.61 0.00 - 0.85 K/uL    Eos (Absolute) 0.47 0.00 - 0.51 K/uL    Baso (Absolute) 0.04 0.00 - 0.12 K/uL    Immature Granulocytes (abs) 0.02 0.00 - 0.11 K/uL    NRBC (Absolute) 0.00 K/uL   Complete Metabolic Panel (CMP)   Result Value Ref Range    Sodium 136 135 - 145 mmol/L    Potassium 3.8 3.6 - 5.5 mmol/L    Chloride 102 96 - 112 mmol/L    Co2 23 20 - 33 mmol/L    Anion Gap 11.0 0.0 - 11.9    Glucose 137 (H) 65 - 99 mg/dL    Bun 22 8 - 22 mg/dL    Creatinine 0.69 0.50 - 1.40 mg/dL    Calcium 8.9 8.5 - 10.5 mg/dL    AST(SGOT) 17 12 - 45 U/L    ALT(SGPT) 17 2 - 50 U/L    Alkaline Phosphatase 49 30 - 99 U/L    Total Bilirubin 0.4 0.1 - 1.5 mg/dL    Albumin 3.6 3.2 - 4.9 g/dL    Total Protein 6.3 6.0 - 8.2 g/dL    Globulin 2.7 1.9 - 3.5 g/dL    A-G Ratio 1.3 g/dL   Btype Natriuretic Peptide   Result Value Ref Range    B Natriuretic Peptide 198 (H) 0 - 100 pg/mL   Troponin STAT   Result Value Ref Range    Troponin I <0.01 0.00 - 0.04 ng/mL   PT/INR   Result Value Ref Range    PT 14.0 12.0 - 14.6 sec    INR 1.11 0.87 - 1.13   APTT   Result Value Ref Range    APTT 30.9 24.7 - 36.0 sec   TSH (for screening thyroid dysfunction)   Result Value Ref Range    TSH 1.290 0.380 - 5.330 uIU/mL   ESTIMATED GFR   Result Value Ref Range    GFR If African American >60 >60 mL/min/1.73 m 2    GFR If Non African American >60 >60 mL/min/1.73 m 2   EKG (NOW)   Result Value Ref Range    Report       [facility] Emergency Dept.    Test Date:  2018-01-25  Pt Name:    EMELY FLORES                Department: ER  MRN:        [id number]                      Room:       [facility]  Gender:     Male                          Technician: [id number]  :        1965                   Requested By:ER TRIAGE PROTOCOL  Order #:    [id number]                    Reading MD:    Measurements  Intervals                                Axis  Rate:       128                          P:  MN:                                      QRS:        -16  QRSD:       92                           T:          43  QT:         316  QTc:        461    Interpretive Statements  ATRIAL FIBRILLATION, V-RATE    BORDERLINE LEFT AXIS DEVIATION  LOW VOLTAGE IN FRONTAL LEADS  Compared to ECG 2017 18:02:16  Low QRS voltage now present  Sinus rhythm no longer present  Right-axis deviation no longer present     EKG (ER)   Result Value Ref Range    Report       [facility] Emergency Dept.    Test Date:  2018  Pt Name:    EMELY FLORES                Department: ER  MRN:        [id number]                      Room:       [facility]  Gender:     Male                         Technician: [id number]  :        1965                   Requested By:NATHALIE GOYAL  Order #:    [id number]                    Reading MD:    Measurements  Intervals                                Axis  Rate:       91                           P:  MN:                                      QRS:        -2  QRSD:       94                           T:          64  QT:         380  QTc:        468    Interpretive Statements  ATRIAL FIBRILLATION, V-RATE    LOW VOLTAGE IN FRONTAL LEADS  Compared to ECG 2018 16:57:23  No significant changes             COURSE & MEDICAL DECISION MAKING  I have reviewed any medical record information, laboratory studies and radiographic results as noted above.  Differential diagnoses includes: New-onset atrial fibrillation, thyroid disorder, electrolyte disorder, anemia, medication reaction    Encounter Summary: This is a 52 y.o. male with palpitations that began today, found to be in atrial fibrillation with rapid ventricular response,  new onset. Just discharged from hospital yesterday after an amputation of his foot secondary to osteomyelitis. On daptomycin through a PICC line which apparently is not withdrawing. On exam he has rapid atrial fibrillation with a heart rate up to 150. He'll be treated with Cardizem, will check blood work including cardiac and thyroid studies and then ultimately reevaluate ------ blood work is largely unremarkable, the patient's heart rate has improved to the 110 range, he will be admitted at this time to the hospital for further evaluation given his new onset atrial fibrillation.    DISPOSITION: Admitted in guarded condition      FINAL IMPRESSION  1. Rapid atrial fibrillation (CMS-HCC)    2. New onset atrial fibrillation (CMS-HCC)           This dictation was created using voice recognition software. The accuracy of the dictation is limited to the abilities of the software. I expect there may be some errors of grammar and possibly content. The nursing notes were reviewed and certain aspects of this information were incorporated into this note.    Electronically signed by: Gigi Koo, 1/25/2018 5:08 PM

## 2018-01-26 NOTE — DIETARY
Nutrition services: Poor PO in nutrition admit screen  Day 1 of admit.  53 yo male with admitting diagnosis: Atrial Fibrillation.  I visited with patient at bedside this afternoon who reports that he has experienced a ~ 25-30 pound weight loss in the last months.  During this time his appetite was poor and he had reduced PO intake.  Currently his appetite is fair but he is receptive to snacks and extra protein and vegetables at meals.     Current problem list:  1) History of HTN and diabetes  2) Recent great toe amputation.     Assessment:  Ht: 6', Wt: 132.5 kg, BMI: 39.6  Weight history: Reported wt loss of 8-9% in the last month which is severe.  Chart wt from 11/10/2017 was 143.3 kg.  Diet/Intake: Diabetic diet, % of one meal consumed    Evaluation:   1) Hyperglycemia, diabetic patient with SSI and metformin per MAR    Malnutrition Risk: Patient likely with chronic illness related malnutrition related to a decrease of 8-9% of his body weight in the last 1-2 months accompanied with a decreased appetite and reduce PO intake.     Recommendations/Plan:  1) Snacks provided BID  2) Will increase portion of protein and vegetables at meal times per patient request.  3) Encourage intake of meals   4) Document intake of all meals as % taken in ADL's to provide interdisciplinary communication across all shifts   5) Monitor weight   6) Nutrition rep will continue to see patient for ongoing meal and snack preferences.     RD monitoring

## 2018-01-26 NOTE — ASSESSMENT & PLAN NOTE
Just dc'ed from hospital recently.  change abx as patient reported allergic skin rash to Daptomycin.  Discussed with Dr. Holley who was involved with patient's care on last admission:  She suggested po Zyvox for 2 weeks with outpatient ID f/u, and DC PICC line.  Cont local wound care.

## 2018-01-26 NOTE — PROGRESS NOTES
Pt brought up from ED w/ ACLS nurse Flaco via gisela.  Pt ambulated to bed.  Monitor applied - pt Afib 130-140.  At 0000  MD called for order of benadryl and change of diet to diabetic - and notified pt passed swallow eval.

## 2018-01-26 NOTE — PROGRESS NOTES
Report received. Pt care assumed. Assessment performed. Pt AOx4. Pt laying supine in bed. Pt denies pain and no signs of distress. Bed in low, locked position. Bed alarm on. Call light within reach. Treaded socks on pt.  Hourly rounding in place.

## 2018-01-26 NOTE — DISCHARGE PLANNING
[facility] Please write home care orders upon discharge to home for continuum of care.[facility] at  /[phone] if there are any questions.

## 2018-01-26 NOTE — ED NOTES
Med rec completed by interview with patient at bedside and discharge summary  Pt is currently receiving daptomycin since 1/24/18 till 2/17/18  Allergy reviewed

## 2018-01-26 NOTE — PROGRESS NOTES
[facility] Hospitalist Progress Note    Date of Service: 2018    Chief Complaint  52 y.o. male admitted 2018 with palpitation/afib RVR    Interval Problem Update  Feels better with less palpitation; no chest pain/sob;  Stated that he developed allergic skin rash to Daptomycin (received 2 doses so far) which is ordered for home IV abx for toe osteo.    Consultants/Specialty  ID, curbside    Disposition  home        Review of Systems   Constitutional: Negative for fever.   HENT: Negative for hearing loss.    Eyes: Negative for blurred vision.   Respiratory: Negative for cough.    Cardiovascular: Positive for palpitations. Negative for chest pain.   Gastrointestinal: Negative for heartburn.   Genitourinary: Negative for dysuria.   Musculoskeletal: Negative for myalgias.   Skin: Positive for rash.   Neurological: Negative for dizziness.   Psychiatric/Behavioral: Negative for depression.      Physical Exam  Laboratory/Imaging   Hemodynamics  Temp (24hrs), Av.7 °C (98.1 °F), Min:36.5 °C (97.7 °F), Max:36.8 °C (98.3 °F)   Temperature: 36.8 °C (98.2 °F)  Pulse  Av.1  Min: 75  Max: 179 Heart Rate (Monitored): (!) 112  Blood Pressure: 145/96 (nurse notified), NIBP: 144/90      Respiratory      Respiration: 15, Pulse Oximetry: 96 %             Fluids    Intake/Output Summary (Last 24 hours) at 18 1056  Last data filed at 18 1000   Gross per 24 hour   Intake              240 ml   Output                0 ml   Net              240 ml       Nutrition  Orders Placed This Encounter   Procedures   • Diet Order     Standing Status:   Standing     Number of Occurrences:   1     Order Specific Question:   Diet:     Answer:   Diabetic [3]     Physical Exam   Constitutional: He is oriented to person, place, and time. No distress.   HENT:   Head: Normocephalic.   Eyes: EOM are normal.   Neck: Neck supple.   Cardiovascular:   Irreg, tachy   Pulmonary/Chest: Effort normal and breath sounds normal.   Abdominal:  Soft. Bowel sounds are normal. He exhibits no distension. There is no tenderness.   Musculoskeletal: He exhibits edema.   1+ ble edema   Neurological: He is alert and oriented to person, place, and time.   Skin: Skin is warm.   Psychiatric: His behavior is normal.       Recent Labs      01/25/18 1811   WBC  7.2   RBC  4.60*   HEMOGLOBIN  12.6*   HEMATOCRIT  38.7*   MCV  84.1   MCH  27.4   MCHC  32.6*   RDW  46.3   PLATELETCT  233   MPV  9.2     Recent Labs      01/25/18 1811 01/26/18   0259   SODIUM  136  140   POTASSIUM  3.8  3.5*   CHLORIDE  102  106   CO2  23  24   GLUCOSE  137*  142*   BUN  22  18   CREATININE  0.69  0.53   CALCIUM  8.9  8.8     Recent Labs      01/25/18 1811   APTT  30.9   INR  1.11     Recent Labs      01/25/18 1811   BNPBTYPENAT  198*              Assessment/Plan     * Atrial fibrillation (CMS-HCC)- (present on admission)   Assessment & Plan    New dx; vent rate still mildly fast now; IV metoprolol to control rate; adjust po meds;  Will start OAC  Check echo; TSH wnl        Gangrene of toe of right foot, s/p recent amputation (CMS-HCC)- (present on admission)   Assessment & Plan    Just dc'ed from hospital 2 days ago.  Will need to change abx as patient reported allergic skin rash to Daptomycin.  Discussed with Dr. Holley who was involved with patient's care on last admission:  She suggested po Zyvox for 2 weeks with outpatient ID f/u, and DC PICC line.  Cont local wound care.        Type 2 diabetes mellitus (CMS-HCC)- (present on admission)   Assessment & Plan    Adjust meds for good control; f/u glucose        Hypertension- (present on admission)   Assessment & Plan    Cont rx and adjust meds for good control          Quality-Core Measures

## 2018-01-26 NOTE — DISCHARGE PLANNING
Upon utilization review, patient noted to be on the following medications that could potentially require prior authorization if prescribed at discharge: Zyvox and Xarelto.  If it is anticipated that patient will require these medications at discharge, beginning the prescription prior auth process in advance to anticipated discharge could assist in preventing delays when patient is medically cleared to be discharged from the hospital.

## 2018-01-26 NOTE — H&P
DATE OF ADMISSION:  01/25/2018    PRIMARY CARE PHYSICIAN:  Jerad Lomax MD    CHIEF COMPLAINT:  Palpitations.    HISTORY OF PRESENT ILLNESS:  Patient is a 52-year-old morbidly obese male that   has a history of hypertension and diabetes.  He was just discharged from the   hospital yesterday.  He was admitted to the hospital and had a great toe   amputation and was discharged home on IV daptomycin.  He has a PICC line in   place.  Today, home health care nurse went to see him, he felt that the PICC   line was not working, but when the nurse observed him and examined him, he was   found to have a rapid heart rate and was brought to the hospital.  Today, he   complains that he has been having some palpitations and felt his heart racing.    He was found in atrial fibrillation with RVR.  In the emergency department,   he has no prior history of this.    REVIEW OF SYSTEMS:  As per HPI.  All other systems have been reviewed and are   negative.    ALLERGIES:  PENICILLIN.    PAST MEDICAL HISTORY:  1.  Diabetes.  2.  Hypertension.    PAST SURGICAL HISTORY:  Right toe amputation.    FAMILY HISTORY:  Has been reviewed and is significant for heart disease in his   father, but no atrial fibrillation.    SOCIAL HISTORY:  Negative for tobacco, drugs, or alcohol use.    HOME MEDICATIONS:  1.  Oxycodone as needed.  2.  Daptomycin 500 mg IV daily.  3.  Aspirin 81 mg daily.  4.  Celebrex as needed.  5.  Metformin 1000 mg b.i.d.  6.  Pravastatin 20 mg daily.  7.  Lisinopril 40 mg daily.  8.  Carvedilol 3.125 mg b.i.d.  9.  Pepcid.    PHYSICAL EXAMINATION:  VITAL SIGNS:  Blood pressure is 145/101, pulse of 75, respirations 16,   temperature 36.8, oxygen saturation 95% on room air and weight 132.45   kilograms.  GENERAL:  Patient is morbidly obese.  He is currently not in any acute   distress.  HEENT:  Moist mucous membranes.  No oropharyngeal exudates.  Eyes, EOMI,   PERRLA.  NECK:  No lymphadenopathy, no JVD.  CARDIOVASCULAR:   Irregularly irregular, heart rate is now in the 70s.  No   murmurs.  LUNGS:  Clear to auscultation bilaterally.  Decreased air entry at the bases.  ABDOMEN:  Positive bowel sounds, soft, nontender, nondistended.  EXTREMITIES:  No clubbing or cyanosis.  Right toe has a dressing in place.  NEUROLOGIC:  He is awake, alert, and oriented to person, place, time, and   situation.    LABORATORY DATA:  WBC is 7.2, hemoglobin 12.6, hematocrit 38.7 and platelets   are 233.  Sodium 136, potassium is 3.8, BUN 22 and creatinine 0.69.  Rest of   the CMP is unremarkable.  Troponin is less than 0.01.  BNP is 198.    ASSESSMENT AND PLAN:  Patient is a 52-year-old male that presents with   palpitations.  1.  Atrial fibrillation with rapid ventricular response.  At this point, I   have started him on metoprolol 12.5 mg b.i.d.  This can likely be increased to   25 mg b.i.d.  We will discontinue his carvedilol.  In addition, he will need   cardiac monitoring.  We will obtain an echocardiogram.  I do not feel that he   needs anticoagulation just yet.  We will consider cardiology consultation, but   currently he is rate controlled.  2.  Right great toe amputation and osteomyelitis.  He is on IV daptomycin.  We   will continue 500 mg IV daily as per his home regimen.  3.  Diabetes.  Hold his metformin.  We will put him on sliding scale insulin   for now.  4.  Hypertension.  Continue metoprolol 12.5 mg b.i.d. and lisinopril 40 mg   daily.  5.  Hyperlipidemia.  Continue pravastatin 20 mg daily.  6.  Morbid obesity.  7.  He is a full code.  8.  Deep venous thrombosis prophylaxis, heparin 5000 units t.i.d.       ____________________________________     MD KIM Champion / NOEMY    DD:  01/25/2018 21:25:12  DT:  01/25/2018 22:27:10    D#:  [id number]  Job#:  [id number]

## 2018-01-26 NOTE — PROGRESS NOTES
Pt with scattered scabbing BUE - pt stating it's from itching   Redness/blanching to neck, BL arms, and upper torso - pt reports is related to rash starting after IV abx given at home  Pt left arm PICC site with skin tears - no discharge.  PICC site dressing changed.  Photos taken.  Pt pannus red, blanching, flaky, 4cm skin tear to right side of pannus.  Pt reported using nystatin at home for it.  Sacral area red and blanching  Pt scab to left shin.  Surgical site to right foot - great toe area - stiches intact - scant discharge on dressing - photo taken.  BL legs are dry and flaky.  Feet are calloused and dry.

## 2018-01-26 NOTE — ASSESSMENT & PLAN NOTE
New dx; vent rate controlled now; on oral metoprolol and Digoxin   started OAC   TSH wnl  Echo: Technically difficult but adequate study for interpretation.   Contrast was used to enhance visualization of the endocardial border.  Normal left ventricular chamber size.  Normal left ventricular systolic function.  Left ventricular ejection fraction is visually estimated to be 60%.  Mild concentric left ventricular hypertrophy.  Trace mitral regurgitation.  Normal left atrial size.  Structurally normal aortic valve without significant stenosis or   regurgitation.  Mild tricuspid regurgitation.  Estimated right ventricular systolic pressure  is 35 mmHg.  Normal inferior vena cava size and inspiratory collapse.  Normal right ventricular size and systolic function

## 2018-01-26 NOTE — CARE PLAN
Problem: Safety  Goal: Will remain free from injury  Educated pt on importance of calling before ambulating to provide assistance and keep pt safe    Problem: Infection  Goal: Will remain free from infection  Discussed w/ pt importance of oral care and hand hygiene especially while in hospital to prevent infection

## 2018-01-26 NOTE — PROGRESS NOTES
Confirmed w/ pharmacist that they are working w/ MD re: Daptomycin being continued in patient as while receiving abx at home pt had redness to neck, chest, BL upper arms.

## 2018-01-26 NOTE — ED NOTES
Chief Complaint   Patient presents with   • Other     red skin on chest after antibiotic   • Rapid Heart Beat     pulse 140s.  a-fib.  no history of a-fib   • Vascular Access Problem     picc line will not draw     Pt states redness began yesterday after antibiotic therapy yesterday through picc line.  Denies sob, difficulty breathing, difficulty swallowing.  No hives.  PTA pt received 25 mg benadryl im.  [facility] home health nurse was unable to draw blood from picc line today.  Ems found pt to be in a-fib at rate of 140s.  Pt denies history of a-fib.  Pt here for above complaint.  Connected to cardiac monitor, BP cuff, and continuous pulse ox upon arrival.  Pt in gown, call light in reach, bed in lowest position.  Chart up for ERP.

## 2018-01-27 LAB
GLUCOSE BLD-MCNC: 120 MG/DL (ref 65–99)
GLUCOSE BLD-MCNC: 129 MG/DL (ref 65–99)
GLUCOSE BLD-MCNC: 138 MG/DL (ref 65–99)
GLUCOSE BLD-MCNC: 139 MG/DL (ref 65–99)
LV EJECT FRACT  99904: 60
LV EJECT FRACT MOD 2C 99903: 55.52
LV EJECT FRACT MOD 4C 99902: 64.73
LV EJECT FRACT MOD BP 99901: 54.74

## 2018-01-27 PROCEDURE — 93306 TTE W/DOPPLER COMPLETE: CPT

## 2018-01-27 PROCEDURE — 82962 GLUCOSE BLOOD TEST: CPT

## 2018-01-27 PROCEDURE — 99232 SBSQ HOSP IP/OBS MODERATE 35: CPT | Performed by: INTERNAL MEDICINE

## 2018-01-27 PROCEDURE — 700102 HCHG RX REV CODE 250 W/ 637 OVERRIDE(OP): Performed by: HOSPITALIST

## 2018-01-27 PROCEDURE — A9270 NON-COVERED ITEM OR SERVICE: HCPCS | Performed by: HOSPITALIST

## 2018-01-27 PROCEDURE — 93306 TTE W/DOPPLER COMPLETE: CPT | Mod: 26 | Performed by: INTERNAL MEDICINE

## 2018-01-27 PROCEDURE — 700102 HCHG RX REV CODE 250 W/ 637 OVERRIDE(OP): Performed by: INTERNAL MEDICINE

## 2018-01-27 PROCEDURE — A9270 NON-COVERED ITEM OR SERVICE: HCPCS | Performed by: INTERNAL MEDICINE

## 2018-01-27 PROCEDURE — 700111 HCHG RX REV CODE 636 W/ 250 OVERRIDE (IP): Performed by: INTERNAL MEDICINE

## 2018-01-27 PROCEDURE — 770020 HCHG ROOM/CARE - TELE (206)

## 2018-01-27 RX ORDER — DIGOXIN 0.25 MG/ML
500 INJECTION INTRAMUSCULAR; INTRAVENOUS ONCE
Status: COMPLETED | OUTPATIENT
Start: 2018-01-27 | End: 2018-01-27

## 2018-01-27 RX ORDER — DIGOXIN 125 MCG
250 TABLET ORAL DAILY
Status: DISCONTINUED | OUTPATIENT
Start: 2018-01-28 | End: 2018-01-29 | Stop reason: HOSPADM

## 2018-01-27 RX ORDER — DIGOXIN 0.25 MG/ML
500 INJECTION INTRAMUSCULAR; INTRAVENOUS DAILY
Status: DISCONTINUED | OUTPATIENT
Start: 2018-01-27 | End: 2018-01-27

## 2018-01-27 RX ORDER — POTASSIUM CHLORIDE 20 MEQ/1
40 TABLET, EXTENDED RELEASE ORAL DAILY
Status: COMPLETED | OUTPATIENT
Start: 2018-01-27 | End: 2018-01-28

## 2018-01-27 RX ADMIN — METFORMIN HYDROCHLORIDE 1000 MG: 500 TABLET, FILM COATED ORAL at 08:25

## 2018-01-27 RX ADMIN — PRAVASTATIN SODIUM 20 MG: 20 TABLET ORAL at 08:25

## 2018-01-27 RX ADMIN — NYSTATIN 1500000 UNITS: 100000 POWDER TOPICAL at 20:30

## 2018-01-27 RX ADMIN — METOPROLOL TARTRATE 50 MG: 50 TABLET, FILM COATED ORAL at 08:25

## 2018-01-27 RX ADMIN — NYSTATIN 1500000 UNITS: 100000 POWDER TOPICAL at 08:26

## 2018-01-27 RX ADMIN — LISINOPRIL 40 MG: 20 TABLET ORAL at 08:25

## 2018-01-27 RX ADMIN — DIGOXIN 500 MCG: 0.25 INJECTION INTRAMUSCULAR; INTRAVENOUS at 11:03

## 2018-01-27 RX ADMIN — LINEZOLID 600 MG: 600 TABLET, FILM COATED ORAL at 08:24

## 2018-01-27 RX ADMIN — POTASSIUM CHLORIDE 40 MEQ: 1500 TABLET, EXTENDED RELEASE ORAL at 11:02

## 2018-01-27 RX ADMIN — OXYCODONE HYDROCHLORIDE 5 MG: 5 TABLET ORAL at 20:31

## 2018-01-27 RX ADMIN — METFORMIN HYDROCHLORIDE 1000 MG: 500 TABLET, FILM COATED ORAL at 17:02

## 2018-01-27 RX ADMIN — METOPROLOL TARTRATE 50 MG: 50 TABLET, FILM COATED ORAL at 20:30

## 2018-01-27 RX ADMIN — RIVAROXABAN 20 MG: 20 TABLET, FILM COATED ORAL at 17:02

## 2018-01-27 RX ADMIN — LINEZOLID 600 MG: 600 TABLET, FILM COATED ORAL at 20:30

## 2018-01-27 ASSESSMENT — CHA2DS2 SCORE
SEX: MALE
PRIOR STROKE OR TIA OR THROMBOEMBOLISM: NO
CHA2DS2 VASC SCORE: 2
VASCULAR DISEASE: NO
DIABETES: YES
CHF OR LEFT VENTRICULAR DYSFUNCTION: NO
AGE 75 OR GREATER: NO
HYPERTENSION: YES
AGE 65 TO 74: NO

## 2018-01-27 ASSESSMENT — PAIN SCALES - GENERAL
PAINLEVEL_OUTOF10: 8
PAINLEVEL_OUTOF10: 0
PAINLEVEL_OUTOF10: 6

## 2018-01-27 ASSESSMENT — ENCOUNTER SYMPTOMS
DIZZINESS: 0
DEPRESSION: 0
HEARTBURN: 0
COUGH: 0
BLURRED VISION: 0
MYALGIAS: 0
FEVER: 0
PALPITATIONS: 1

## 2018-01-27 ASSESSMENT — PATIENT HEALTH QUESTIONNAIRE - PHQ9
2. FEELING DOWN, DEPRESSED, IRRITABLE, OR HOPELESS: NOT AT ALL
SUM OF ALL RESPONSES TO PHQ QUESTIONS 1-9: 0
SUM OF ALL RESPONSES TO PHQ9 QUESTIONS 1 AND 2: 0
1. LITTLE INTEREST OR PLEASURE IN DOING THINGS: NOT AT ALL

## 2018-01-27 NOTE — PROGRESS NOTES
Assumed care of patient at bedside report from day shift RN. Updated on POC. Patient currently asleep in bed and in Afib. Call light within reach. Fall precautions in place. Bed locked and in lowest position. No needs indicated at this time.

## 2018-01-27 NOTE — CARE PLAN
Problem: Communication  Goal: The ability to communicate needs accurately and effectively will improve    Intervention: Educate patient and significant other/support system about the plan of care, procedures, treatments, medications and allow for questions  Update pt on POC. Answer questions as needed.      Problem: Safety  Goal: Will remain free from injury    Intervention: Provide assistance with mobility  Pt educated on fall precautions. Bed in low, locked position. Call light within reach. Bed alarm on. Treaded socks on pt. Hourly rounding in place.

## 2018-01-27 NOTE — CARE PLAN
Problem: Safety  Goal: Will remain free from injury    Intervention: Provide assistance with mobility  RN or CNA to provide assistance w/ mobility PRN, pt calls appropriately, universal fall precautions in place, will continue to monitor.

## 2018-01-27 NOTE — PROGRESS NOTES
[facility] Hospitalist Progress Note    Date of Service: 2018    Chief Complaint  52 y.o. male admitted 2018 with palpitation/afib RVR    Interval Problem Update  Still has mild palpitation but no cp/sob.  Otherwise doing fine.    Consultants/Specialty  ID, curbside    Disposition  home        Review of Systems   Constitutional: Negative for fever.   HENT: Negative for hearing loss.    Eyes: Negative for blurred vision.   Respiratory: Negative for cough.    Cardiovascular: Positive for palpitations. Negative for chest pain.   Gastrointestinal: Negative for heartburn.   Genitourinary: Negative for dysuria.   Musculoskeletal: Negative for myalgias.   Skin: Positive for rash.   Neurological: Negative for dizziness.   Psychiatric/Behavioral: Negative for depression.      Physical Exam  Laboratory/Imaging   Hemodynamics  Temp (24hrs), Av.1 °C (98.7 °F), Min:36.6 °C (97.9 °F), Max:37.5 °C (99.5 °F)   Temperature: 37 °C (98.6 °F)  Pulse  Av.5  Min: 66  Max: 179   Blood Pressure: 104/66      Respiratory      Respiration: 14, Pulse Oximetry: 96 %             Fluids    Intake/Output Summary (Last 24 hours) at 18 0944  Last data filed at 18 0800   Gross per 24 hour   Intake              960 ml   Output              450 ml   Net              510 ml       Nutrition  Orders Placed This Encounter   Procedures   • Diet Order     Standing Status:   Standing     Number of Occurrences:   1     Order Specific Question:   Diet:     Answer:   Diabetic [3]     Physical Exam   Constitutional: He is oriented to person, place, and time. No distress.   HENT:   Head: Normocephalic.   Eyes: EOM are normal.   Neck: Neck supple.   Cardiovascular:   Irreg, tachy   Pulmonary/Chest: Effort normal and breath sounds normal.   Abdominal: Soft. Bowel sounds are normal. He exhibits no distension. There is no tenderness.   Musculoskeletal: He exhibits edema.   1+ ble edema   Neurological: He is alert and oriented to person,  place, and time.   Skin: Skin is warm.   Psychiatric: His behavior is normal.       Recent Labs      01/25/18 1811   WBC  7.2   RBC  4.60*   HEMOGLOBIN  12.6*   HEMATOCRIT  38.7*   MCV  84.1   MCH  27.4   MCHC  32.6*   RDW  46.3   PLATELETCT  233   MPV  9.2     Recent Labs      01/25/18 1811 01/26/18   0259   SODIUM  136  140   POTASSIUM  3.8  3.5*   CHLORIDE  102  106   CO2  23  24   GLUCOSE  137*  142*   BUN  22  18   CREATININE  0.69  0.53   CALCIUM  8.9  8.8     Recent Labs      01/25/18 1811   APTT  30.9   INR  1.11     Recent Labs      01/25/18 1811   BNPBTYPENAT  198*              Assessment/Plan     * Atrial fibrillation (CMS-HCC)- (present on admission)   Assessment & Plan    New dx; vent rate still mildly fast now; on oral metoprolol; add Digoxin   started OAC  Check echo; TSH wnl        Gangrene of toe of right foot, s/p recent amputation (CMS-HCC)- (present on admission)   Assessment & Plan    Just dc'ed from hospital 2 days ago.  Will need to change abx as patient reported allergic skin rash to Daptomycin.  Discussed with Dr. Holley who was involved with patient's care on last admission:  She suggested po Zyvox for 2 weeks with outpatient ID f/u, and DC PICC line.  Cont local wound care.        Type 2 diabetes mellitus (CMS-HCC)- (present on admission)   Assessment & Plan    Adjust meds for good control; f/u glucose        Hypertension- (present on admission)   Assessment & Plan    Cont rx and adjust meds for good control          Quality-Core Measures

## 2018-01-27 NOTE — PROGRESS NOTES
LIMB PRESERVATION SERVICE NOTE:    Wound(s):Right foot first ray amputation  Allergies: Pcn [penicillins] and Daptomycin  Start of Care: 1/27/2018  Room/Bed  T828/02      Subjective:      History of Present Illness:   Past Medical History:   Diagnosis Date   • Diabetes (CMS-HCC)    • Hypertension        Patient is a 52 y.o. male. Admitted for Atrial fibrillation (CMS-HCC),     Patient is well known to LPS and outpatient wound care services.        Patient presented with S/p R great toe amputation by Dr. Leo on 11/10/17 for infection causing sepsis. Right foot first ray amputation 1/17/18 with Dr Delong.      Patient denies fevers chills, nausea, vomiting.     Pain:        Patient resting comfortably        Objective:        PHYSICAL EXAMINATION:     General Appearance:  Well developed,  nourished, in no acute distress    Sensory Assessment       Patient sensation insensate bilaterally with light touch 10 of 10 points        Vascular Assessment       +2 DP, +2 PT bilaterally    Orthotic, protective, supportive devices:     Offloading  Patient with Osiris boot at bedside HWB OOB    Vitals    /62   Pulse 99   Temp 36.6 °C (97.9 °F)   Resp 14   Ht 1.829 m (6')   Wt (!) 134.3 kg (296 lb 1.2 oz)   SpO2 98%   BMI 40.16 kg/m²      Wound Characteristics                                                    Location:Right foot first ray amputation   Initial Evaluation  Date:1/27/2018   Tissue Type and %: 80% Red 10%White Maceration  10 % 2 cmX 2 cm Purple/Red area on superior aspect of suture line   Periwound: Red   Drainage: Scant Serous   Exposed structures Sutures   Wound Edges:   Attached 100% Approximated   Odor: None   S&S of Infection:   Edema/Erythema   Edema: Localized at Site   Sensation: Insensate               Measurements: Initial Evaluation  Date:1/27/2018   Length (cm) 2   Width (cm) 2   Depth (cm)    Tract/undermine            Tests and Measures:    Labs  Recent Labs      01/25/18   1811   WBC   7.2   RBC  4.60*   HEMOGLOBIN  12.6*   HEMATOCRIT  38.7*   MCV  84.1   MCH  27.4   MCHC  32.6*   RDW  46.3   PLATELETCT  233   MPV  9.2     Recent Labs      01/25/18   1811  01/26/18   0259   SODIUM  136  140   POTASSIUM  3.8  3.5*   CHLORIDE  102  106   CO2  23  24   GLUCOSE  137*  142*   BUN  22  18       A1C 9.2% 11/2017  ESR: NA  CRP: NA    SHAYAN    NA    Imaging      Infection Management    NA     Procedures:     Debridement :  NA   Cleansed with:  NS                                                                        Periwound protected with: Skin Prep   Primary dressing: Adaptic   Secondary Dressing: Abdominal Pad   Other: Kerlix     Patient Education: Implications of loss of protective sensation (LOPS) discussed with patient- including increased risk for amputation.  Advised to check foot at least daily, moisturize foot, and to always wear protective foot wear.    Professional Collaboration: bedside RN      Assessment:      Wound etiology: Diabetic Foot Wound     Wound Progress:  Initial Assessment     Rationale for Treatment: OM Tx     Patient tolerance/compliance: Open to Education     Complicating factors: DM, Obesity     Need for ongoing  Wound Care: Nursing to do wound care, LPS will follow         Plan:      Treatment Plan and Recommendations:     Procedures:   Nursing to cleanse wound/periwound with NS.  Pat periwound dry.  Apply skin prep/No Sting to periwound.  Let air dry for 1-2 minutes. Apply single layer adaptic, cut to size, to suture sites. Cover with Abdominal Dressing and secure with kerlix. Notify wound team if wound deteriorates or fails to progress.     Frequency:   NURSING TO CHANGE RIGHT FOOT SURGICAL SITE DRESSING EVERY 48 HOURS AND PRN FOR SATURATION OR DISLODGEMENT      Dressing Orders Updated  Offloading boot for R foot, R foot HWB OOB  AB per ID     Anticipated discharge plans (X):  SNF:           Home Care:           Outpatient Wound Center:            Self Care:             Other:           TBD: X

## 2018-01-27 NOTE — CARE PLAN
Problem: Knowledge Deficit  Goal: Knowledge of the prescribed therapeutic regimen will improve  Outcome: PROGRESSING AS EXPECTED  Discussed evening medications with patient. Asked appropriate questions. Verbalized understanding of all education.     Problem: Pain Management  Goal: Pain level will decrease to patient's comfort goal  Outcome: PROGRESSING AS EXPECTED  Patient verbalized increasing discomfort 7/10; discussed pharmacologic and non pharmacologic pain interventions. Patient verbalized understanding. Medicated per MAR. Patient indicated a decrease in discomfort to comfort goal.

## 2018-01-28 PROBLEM — R07.9 CHEST PAIN: Status: ACTIVE | Noted: 2018-01-28

## 2018-01-28 LAB
GLUCOSE BLD-MCNC: 126 MG/DL (ref 65–99)
GLUCOSE BLD-MCNC: 132 MG/DL (ref 65–99)
GLUCOSE BLD-MCNC: 134 MG/DL (ref 65–99)
GLUCOSE BLD-MCNC: 137 MG/DL (ref 65–99)
TROPONIN I SERPL-MCNC: <0.01 NG/ML (ref 0–0.04)

## 2018-01-28 PROCEDURE — 84484 ASSAY OF TROPONIN QUANT: CPT | Mod: 91

## 2018-01-28 PROCEDURE — 700111 HCHG RX REV CODE 636 W/ 250 OVERRIDE (IP): Performed by: INTERNAL MEDICINE

## 2018-01-28 PROCEDURE — 700102 HCHG RX REV CODE 250 W/ 637 OVERRIDE(OP): Performed by: INTERNAL MEDICINE

## 2018-01-28 PROCEDURE — A6250 SKIN SEAL PROTECT MOISTURIZR: HCPCS | Performed by: NURSE PRACTITIONER

## 2018-01-28 PROCEDURE — 99232 SBSQ HOSP IP/OBS MODERATE 35: CPT | Performed by: INTERNAL MEDICINE

## 2018-01-28 PROCEDURE — 700102 HCHG RX REV CODE 250 W/ 637 OVERRIDE(OP): Performed by: HOSPITALIST

## 2018-01-28 PROCEDURE — 82962 GLUCOSE BLOOD TEST: CPT | Mod: 91

## 2018-01-28 PROCEDURE — 770020 HCHG ROOM/CARE - TELE (206)

## 2018-01-28 PROCEDURE — A9270 NON-COVERED ITEM OR SERVICE: HCPCS | Performed by: INTERNAL MEDICINE

## 2018-01-28 PROCEDURE — 36415 COLL VENOUS BLD VENIPUNCTURE: CPT

## 2018-01-28 PROCEDURE — A9270 NON-COVERED ITEM OR SERVICE: HCPCS | Performed by: HOSPITALIST

## 2018-01-28 RX ORDER — DIGOXIN 0.25 MG/ML
250 INJECTION INTRAMUSCULAR; INTRAVENOUS ONCE
Status: COMPLETED | OUTPATIENT
Start: 2018-01-28 | End: 2018-01-28

## 2018-01-28 RX ADMIN — NYSTATIN 1500000 UNITS: 100000 POWDER TOPICAL at 21:15

## 2018-01-28 RX ADMIN — LINEZOLID 600 MG: 600 TABLET, FILM COATED ORAL at 21:11

## 2018-01-28 RX ADMIN — NYSTATIN 1500000 UNITS: 100000 POWDER TOPICAL at 08:24

## 2018-01-28 RX ADMIN — RIVAROXABAN 20 MG: 20 TABLET, FILM COATED ORAL at 17:30

## 2018-01-28 RX ADMIN — ASPIRIN 81 MG: 81 TABLET, COATED ORAL at 13:32

## 2018-01-28 RX ADMIN — METOPROLOL TARTRATE 50 MG: 50 TABLET, FILM COATED ORAL at 08:23

## 2018-01-28 RX ADMIN — DIGOXIN 250 MCG: 0.25 INJECTION INTRAMUSCULAR; INTRAVENOUS at 09:50

## 2018-01-28 RX ADMIN — OXYCODONE HYDROCHLORIDE 5 MG: 5 TABLET ORAL at 08:23

## 2018-01-28 RX ADMIN — METFORMIN HYDROCHLORIDE 1000 MG: 500 TABLET, FILM COATED ORAL at 08:23

## 2018-01-28 RX ADMIN — POTASSIUM CHLORIDE 40 MEQ: 1500 TABLET, EXTENDED RELEASE ORAL at 08:23

## 2018-01-28 RX ADMIN — PRAVASTATIN SODIUM 20 MG: 20 TABLET ORAL at 08:23

## 2018-01-28 RX ADMIN — LISINOPRIL 40 MG: 20 TABLET ORAL at 08:23

## 2018-01-28 RX ADMIN — METOPROLOL TARTRATE 50 MG: 50 TABLET, FILM COATED ORAL at 21:11

## 2018-01-28 RX ADMIN — ACETAMINOPHEN 650 MG: 325 TABLET, FILM COATED ORAL at 11:20

## 2018-01-28 RX ADMIN — DIGOXIN 250 MCG: 125 TABLET ORAL at 17:30

## 2018-01-28 RX ADMIN — LINEZOLID 600 MG: 600 TABLET, FILM COATED ORAL at 08:23

## 2018-01-28 RX ADMIN — METFORMIN HYDROCHLORIDE 1000 MG: 500 TABLET, FILM COATED ORAL at 17:30

## 2018-01-28 RX ADMIN — OXYCODONE HYDROCHLORIDE 5 MG: 5 TABLET ORAL at 13:34

## 2018-01-28 ASSESSMENT — PATIENT HEALTH QUESTIONNAIRE - PHQ9
1. LITTLE INTEREST OR PLEASURE IN DOING THINGS: NOT AT ALL
SUM OF ALL RESPONSES TO PHQ9 QUESTIONS 1 AND 2: 0
SUM OF ALL RESPONSES TO PHQ QUESTIONS 1-9: 0
2. FEELING DOWN, DEPRESSED, IRRITABLE, OR HOPELESS: NOT AT ALL

## 2018-01-28 ASSESSMENT — ENCOUNTER SYMPTOMS
DIZZINESS: 0
FEVER: 0
DEPRESSION: 0
MYALGIAS: 0
PALPITATIONS: 0
HEARTBURN: 0
BLURRED VISION: 0
COUGH: 0

## 2018-01-28 ASSESSMENT — PAIN SCALES - GENERAL
PAINLEVEL_OUTOF10: 0
PAINLEVEL_OUTOF10: 3
PAINLEVEL_OUTOF10: 0

## 2018-01-28 NOTE — CARE PLAN
Problem: Communication  Goal: The ability to communicate needs accurately and effectively will improve  Outcome: PROGRESSING AS EXPECTED  POC discussed with pt. at bedside. All questions and concerns have been addressed at this time. Verbal understanding recieved    Problem: Pain Management  Goal: Pain level will decrease to patient's comfort goal  Outcome: PROGRESSING AS EXPECTED  Pt. Pain assessed as needed. Pain managed with pharmacological intervention and rest/relaxation/repositioning as needed

## 2018-01-28 NOTE — CARE PLAN
Problem: Pain Management  Goal: Pain level will decrease to patient's comfort goal    Intervention: Educate and implement non-pharmacologic comfort measures. Examples: relaxation, distration, play therapy, activity therapy, massage, etc.  Pt edu provided, pt stated understanding, will continue to monitor.

## 2018-01-28 NOTE — WOUND TEAM
LIMB PRESERVATION SERVICE NOTE:    Wound(s):Right foot first ray amputation, Pannus, Left PICC line site  Allergies: Pcn [penicillins] and Daptomycin  Start of Care: 1/27/2018  Room/Bed  T828/02      Subjective:      History of Present Illness:   Past Medical History:   Diagnosis Date   • Diabetes (CMS-HCC)    • Hypertension        Patient is a 52 y.o. male. Admitted for Atrial fibrillation (CMS-HCC),     Patient is well known to LPS and outpatient wound care services.        Patient presented with S/p R great toe amputation by Dr. Leo on 11/10/17 for infection causing sepsis. Right foot first ray amputation 1/17/18 with Dr Delong.     PICC line dressing with intact partial thickness bulla underneath dressing on medial aspect of left arm. Erythema underneath pannus with patient report of itching.     Patient denies fevers chills, nausea, vomiting.     Pain:        Patient resting comfortably        Objective:        PHYSICAL EXAMINATION:     General Appearance:  Well developed,  nourished, in no acute distress    Sensory Assessment       Patient sensation insensate bilaterally with light touch 10 of 10 points        Vascular Assessment       +2 DP, +2 PT bilaterally    Orthotic, protective, supportive devices:     Offloading  Patient with Osiris boot at bedside HWB OOB    Vitals    /98   Pulse (!) 120   Temp 36.7 °C (98.1 °F)   Resp 16   Ht 1.829 m (6')   Wt (!) 135.2 kg (298 lb 1 oz)   SpO2 95%   BMI 40.42 kg/m²      Wound Characteristics                                                    Location:Right foot first ray amputation   Initial Evaluation  Date:1/27/2018   Tissue Type and %: 80% Red 10%White Maceration  10 % 2 cmX 2 cm Purple/Red area on superior aspect of suture line   Periwound: Red   Drainage: Scant Serous   Exposed structures Sutures   Wound Edges:   Attached 100% Approximated   Odor: None   S&S of Infection:   Edema/Erythema   Edema: Localized at Site   Sensation: Insensate                Measurements: Initial Evaluation  Date:1/27/2018   Length (cm) 2   Width (cm) 2   Depth (cm)    Tract/undermine        Wound Characteristics                                                    Location: Medial Left Arm PICC site under dressing   Initial Evaluation  Date:1/28/2018   Tissue Type and %: Intact Bulla    Periwound: Red   Drainage: Scant Serous   Exposed structures None   Wound Edges:   Attached   Odor: None   S&S of Infection:   Edema/Erythema   Edema: Localized at site   Sensation: intact               Measurements: Initial Evaluation  Date:1/28/2018   Length (cm) approx 1   Width (cm) approx 3   Depth (cm)    Tract/undermine              Minor Redness under pannus w itching            Tests and Measures:    Labs  Recent Labs      01/25/18   1811   WBC  7.2   RBC  4.60*   HEMOGLOBIN  12.6*   HEMATOCRIT  38.7*   MCV  84.1   MCH  27.4   MCHC  32.6*   RDW  46.3   PLATELETCT  233   MPV  9.2     Recent Labs      01/25/18   1811  01/26/18   0259   SODIUM  136  140   POTASSIUM  3.8  3.5*   CHLORIDE  102  106   CO2  23  24   GLUCOSE  137*  142*   BUN  22  18       A1C 9.2% 11/2017  ESR: NA  CRP: NA    SHAYAN    NA    Imaging      Infection Management    NA     Procedures:     Debridement :  NA   Cleansed with:  NS                                                                        Periwound protected with: Skin Prep   Primary dressing: Adaptic   Secondary Dressing: Abdominal Pad   Other: Kerlix     Patient Education: Implications of loss of protective sensation (LOPS) discussed with patient- including increased risk for amputation.  Advised to check foot at least daily, moisturize foot, and to always wear protective foot wear.    Professional Collaboration: bedside RN      Assessment:      Wound etiology: Diabetic Foot Wound     Wound Progress:  Initial Assessment     Rationale for Treatment: OM Tx     Patient tolerance/compliance: Open to Education     Complicating factors: DM, Obesity     Need for ongoing   Wound Care: Nursing to do wound care, LPS will follow         Plan:      Treatment Plan and Recommendations:     Procedures:   Nursing to cleanse wound/periwound with NS.  Pat periwound dry.  Apply skin prep/No Sting to periwound.  Let air dry for 1-2 minutes. Apply single layer adaptic, cut to size, to suture sites. Cover with Abdominal Dressing and secure with kerlix. Notify wound team if wound deteriorates or fails to progress.     Frequency:   NURSING TO CHANGE RIGHT FOOT SURGICAL SITE DRESSING EVERY 48 HOURS AND PRN FOR SATURATION OR DISLODGEMENT      Dressing Orders Updated  Offloading boot for R foot, R foot HWB OOB  AB per ID     REINFORCE PICC DRESSING WITH TEGADERM TO PREVENT CLABSI  CONTINUE WITH Q 6 hr use of nystatin powder, interdry to be used as well under pannus, to be changed weekly.      Anticipated discharge plans (X):  SNF:           Home Care:           Outpatient Wound Center:            Self Care:            Other:           TBD: X

## 2018-01-28 NOTE — PROGRESS NOTES
[facility]ist Progress Note    Date of Service: 2018    Chief Complaint  52 y.o. male admitted 2018 with palpitation/afib RVR    Interval Problem Update  C/o LS chest pain earlier today, lasted about 1 hour, squeezing, 5/10, no radiation, assoc with dizziness but no n/v/sob/diaphoresis.  No pain now.  No palpitation.    Consultants/Specialty  ID, curbside    Disposition  home        Review of Systems   Constitutional: Negative for fever.   HENT: Negative for hearing loss.    Eyes: Negative for blurred vision.   Respiratory: Negative for cough.    Cardiovascular: Positive for chest pain. Negative for palpitations.   Gastrointestinal: Negative for heartburn.   Genitourinary: Negative for dysuria.   Musculoskeletal: Negative for myalgias.   Skin: Negative for rash.   Neurological: Negative for dizziness.   Psychiatric/Behavioral: Negative for depression.      Physical Exam  Laboratory/Imaging   Hemodynamics  Temp (24hrs), Av.6 °C (97.8 °F), Min:36.4 °C (97.6 °F), Max:36.7 °C (98.1 °F)   Temperature: 36.7 °C (98.1 °F)  Pulse  Av.3  Min: 66  Max: 179   Blood Pressure: 138/98      Respiratory      Respiration: 16, Pulse Oximetry: 95 %             Fluids    Intake/Output Summary (Last 24 hours) at 18 1151  Last data filed at 18 1700   Gross per 24 hour   Intake              660 ml   Output              200 ml   Net              460 ml       Nutrition  Orders Placed This Encounter   Procedures   • Diet Order     Standing Status:   Standing     Number of Occurrences:   1     Order Specific Question:   Diet:     Answer:   Diabetic [3]     Physical Exam   Constitutional: He is oriented to person, place, and time. No distress.   HENT:   Head: Normocephalic.   Eyes: EOM are normal.   Neck: Neck supple.   Cardiovascular:   Irreg, tachy   Pulmonary/Chest: Effort normal and breath sounds normal.   Abdominal: Soft. Bowel sounds are normal. He exhibits no distension. There is no tenderness.    Musculoskeletal: He exhibits edema.   1+ ble edema   Neurological: He is alert and oriented to person, place, and time.   Skin: Skin is warm.   Psychiatric: His behavior is normal.       Recent Labs      01/25/18 1811   WBC  7.2   RBC  4.60*   HEMOGLOBIN  12.6*   HEMATOCRIT  38.7*   MCV  84.1   MCH  27.4   MCHC  32.6*   RDW  46.3   PLATELETCT  233   MPV  9.2     Recent Labs      01/25/18 1811 01/26/18   0259   SODIUM  136  140   POTASSIUM  3.8  3.5*   CHLORIDE  102  106   CO2  23  24   GLUCOSE  137*  142*   BUN  22  18   CREATININE  0.69  0.53   CALCIUM  8.9  8.8     Recent Labs      01/25/18 1811   APTT  30.9   INR  1.11     Recent Labs      01/25/18 1811   BNPBTYPENAT  198*              Assessment/Plan     * Atrial fibrillation (CMS-[facility])- (present on admission)   Assessment & Plan    New dx; vent rate controlled now; on oral metoprolol and Digoxin   started OAC   TSH wnl  Echo: Technically difficult but adequate study for interpretation.   Contrast was used to enhance visualization of the endocardial border.  Normal left ventricular chamber size.  Normal left ventricular systolic function.  Left ventricular ejection fraction is visually estimated to be 60%.  Mild concentric left ventricular hypertrophy.  Trace mitral regurgitation.  Normal left atrial size.  Structurally normal aortic valve without significant stenosis or   regurgitation.  Mild tricuspid regurgitation.  Estimated right ventricular systolic pressure  is 35 mmHg.  Normal inferior vena cava size and inspiratory collapse.  Normal right ventricular size and systolic function        Chest pain   Assessment & Plan    At risk for CAD.  Check serial trops and order stress test in am.  Cont current cardiac meds.        Gangrene of toe of right foot, s/p recent amputation (CMS-[facility])- (present on admission)   Assessment & Plan    Just dc'ed from hospital recently.  change abx as patient reported allergic skin rash to Daptomycin.  Discussed with   Leydi who was involved with patient's care on last admission:  She suggested po Zyvox for 2 weeks with outpatient ID f/u, and DC PICC line.  Cont local wound care.        Type 2 diabetes mellitus (CMS-HCC)- (present on admission)   Assessment & Plan    Adjust meds for good control; f/u glucose        Hypertension- (present on admission)   Assessment & Plan    Cont rx and adjust meds for good control          Quality-Core Measures

## 2018-01-28 NOTE — PROGRESS NOTES
Assumed care of pt. at 1900    Bedside report received from Day Shift RN   POC discussed with pt. At bedside and Pt. verbalizes understanding.  Pt. Is Awake and AOx 4 , running AFIB 81 on the monitor  Call light within reach  with appropriate instructions to call for assistance  Bed locked and in lowest position.

## 2018-01-29 ENCOUNTER — APPOINTMENT (OUTPATIENT)
Dept: RADIOLOGY | Facility: MEDICAL CENTER | Age: 53
DRG: 309 | End: 2018-01-29
Attending: INTERNAL MEDICINE
Payer: MEDICAID

## 2018-01-29 VITALS
TEMPERATURE: 97.9 F | SYSTOLIC BLOOD PRESSURE: 143 MMHG | RESPIRATION RATE: 18 BRPM | OXYGEN SATURATION: 91 % | WEIGHT: 298.06 LBS | DIASTOLIC BLOOD PRESSURE: 95 MMHG | BODY MASS INDEX: 40.37 KG/M2 | HEART RATE: 62 BPM | HEIGHT: 72 IN

## 2018-01-29 LAB
GLUCOSE BLD-MCNC: 112 MG/DL (ref 65–99)
GLUCOSE BLD-MCNC: 133 MG/DL (ref 65–99)
GLUCOSE BLD-MCNC: 141 MG/DL (ref 65–99)
TROPONIN I SERPL-MCNC: <0.01 NG/ML (ref 0–0.04)

## 2018-01-29 PROCEDURE — A9270 NON-COVERED ITEM OR SERVICE: HCPCS | Performed by: HOSPITALIST

## 2018-01-29 PROCEDURE — 84484 ASSAY OF TROPONIN QUANT: CPT | Mod: 91

## 2018-01-29 PROCEDURE — A9270 NON-COVERED ITEM OR SERVICE: HCPCS | Performed by: INTERNAL MEDICINE

## 2018-01-29 PROCEDURE — 700102 HCHG RX REV CODE 250 W/ 637 OVERRIDE(OP): Performed by: INTERNAL MEDICINE

## 2018-01-29 PROCEDURE — 99239 HOSP IP/OBS DSCHRG MGMT >30: CPT | Performed by: INTERNAL MEDICINE

## 2018-01-29 PROCEDURE — 82962 GLUCOSE BLOOD TEST: CPT

## 2018-01-29 PROCEDURE — A9502 TC99M TETROFOSMIN: HCPCS

## 2018-01-29 PROCEDURE — 36415 COLL VENOUS BLD VENIPUNCTURE: CPT

## 2018-01-29 PROCEDURE — 700102 HCHG RX REV CODE 250 W/ 637 OVERRIDE(OP): Performed by: HOSPITALIST

## 2018-01-29 PROCEDURE — 700111 HCHG RX REV CODE 636 W/ 250 OVERRIDE (IP): Performed by: HOSPITALIST

## 2018-01-29 PROCEDURE — 700111 HCHG RX REV CODE 636 W/ 250 OVERRIDE (IP)

## 2018-01-29 RX ORDER — LINEZOLID 600 MG/1
600 TABLET, FILM COATED ORAL EVERY 12 HOURS
Qty: 20 TAB | Refills: 0 | Status: SHIPPED | OUTPATIENT
Start: 2018-01-29 | End: 2018-02-01

## 2018-01-29 RX ORDER — DIGOXIN 250 MCG
250 TABLET ORAL DAILY
Qty: 30 TAB | Refills: 0 | Status: SHIPPED | OUTPATIENT
Start: 2018-01-29 | End: 2018-02-08

## 2018-01-29 RX ORDER — REGADENOSON 0.08 MG/ML
INJECTION, SOLUTION INTRAVENOUS
Status: COMPLETED
Start: 2018-01-29 | End: 2018-01-29

## 2018-01-29 RX ORDER — METOPROLOL TARTRATE 50 MG/1
50 TABLET, FILM COATED ORAL 2 TIMES DAILY
Qty: 60 TAB | Refills: 0 | Status: ON HOLD | OUTPATIENT
Start: 2018-01-29 | End: 2018-02-09

## 2018-01-29 RX ADMIN — DIGOXIN 250 MCG: 125 TABLET ORAL at 17:23

## 2018-01-29 RX ADMIN — REGADENOSON 0.4 MG: 0.08 INJECTION, SOLUTION INTRAVENOUS at 14:03

## 2018-01-29 RX ADMIN — ASPIRIN 81 MG: 81 TABLET, COATED ORAL at 08:59

## 2018-01-29 RX ADMIN — ONDANSETRON 4 MG: 2 INJECTION INTRAMUSCULAR; INTRAVENOUS at 16:56

## 2018-01-29 RX ADMIN — LISINOPRIL 40 MG: 20 TABLET ORAL at 08:59

## 2018-01-29 RX ADMIN — OXYCODONE HYDROCHLORIDE 5 MG: 5 TABLET ORAL at 02:14

## 2018-01-29 RX ADMIN — NYSTATIN 1500000 UNITS: 100000 POWDER TOPICAL at 09:04

## 2018-01-29 RX ADMIN — LINEZOLID 600 MG: 600 TABLET, FILM COATED ORAL at 08:58

## 2018-01-29 RX ADMIN — METFORMIN HYDROCHLORIDE 1000 MG: 500 TABLET, FILM COATED ORAL at 07:46

## 2018-01-29 RX ADMIN — METOPROLOL TARTRATE 50 MG: 50 TABLET, FILM COATED ORAL at 08:59

## 2018-01-29 RX ADMIN — METFORMIN HYDROCHLORIDE 1000 MG: 500 TABLET, FILM COATED ORAL at 16:56

## 2018-01-29 RX ADMIN — RIVAROXABAN 20 MG: 20 TABLET, FILM COATED ORAL at 17:24

## 2018-01-29 RX ADMIN — PRAVASTATIN SODIUM 20 MG: 20 TABLET ORAL at 08:59

## 2018-01-29 ASSESSMENT — PAIN SCALES - GENERAL
PAINLEVEL_OUTOF10: 0
PAINLEVEL_OUTOF10: 8
PAINLEVEL_OUTOF10: 2

## 2018-01-29 ASSESSMENT — LIFESTYLE VARIABLES: EVER_SMOKED: NEVER

## 2018-01-29 NOTE — PROGRESS NOTES
LIMB PRESERVATION SERVICE      53 y/o male with DM, obesity, HTN, and peripheral neuropathy. Admitted for afib with RVR. Pt also with suspected reaction to daptomycin.  Known to LPS for R foot. S/p R 1st ray amputation on 1/17/18 by Dr. Delong    Remains IP for stress test today    /68   Pulse 73   Temp 36.2 °C (97.2 °F)   Resp 20   Ht 1.829 m (6')   Wt (!) 135.2 kg (298 lb 1 oz)   SpO2 96%   BMI 40.42 kg/m²   Pain controlled    DM  Glucose 112 this am  Seen by DME past admission    Abx:  On zyvox per ID rec  PICC dcd      Wearing post op shoe. [employer] boot left at home when EMS came to pt's home.   Instructed pt to ambulate HWB. Verbalized understanding. Requested for friend to bring boot into hospital if pt does not discharge by tomorrow.      R 1st ray amputation:   Incision well approximated with sutures. ~2.2x2.2cm draining bulla to proximal aspect. Serous fluid. Bulla dark purple. Less tenderness noted compared to last week. Eschar beginning to form to the area.     Incision cleaned with NS, applied mepitel followed by adhesive foam.      DTI to lateral foot x2, POA.   Proximal DTI measures 2.5 x 1.4cm  Distal DTI measures 1.5 x 1.3cm  Both DTI are purple non blanching with blanching skin to periwound  DTI left CHARLEY       L great toe plantar partial thickness ulcer complicated by neuropathy:  Pt unaware he had this ulcer. Denies any injuries to his foot  Wound bed moist, partially covered by epidermis. Tissue visible is 100% pink  Area cleaned with NS, applied adhesive foam.       Toenails overgrown, callus to bilateral foot. Dimethicone lotion applied to both feet          PLAN  Wound care orders updated for nursing for L and R foot  zyvox per ID rec  RLE HWB. Friend to bring boot from home if remains IP tomorrow  Ordered shoe for L foot.         D/C plan: resume HH?   F/U at LPS rounds at wound clinic on Feb 9

## 2018-01-29 NOTE — CARE PLAN
Problem: Nutritional:  Goal: Achieve adequate nutritional intake  Patient will consume >50% of meals   Outcome: MET Date Met: 01/29/18  Per review of ADLs, pt is consuming % of majority of meals.   PO intake adequate currently - please re-consult for any further nutritional needs.     RD available prn

## 2018-01-29 NOTE — DISCHARGE PLANNING
LUNA requested  Zyvox and Xarelto scripts from MD during rounds. MD gave LUNA script for Zyvox, Xarelto, and Digoxin to find out co-pay.     LUNA faxed script to pt's [facility] pharmacy ([phone]). Will f/u

## 2018-01-29 NOTE — CARE PLAN
Problem: Knowledge Deficit  Goal: Knowledge of disease process/condition, treatment plan, diagnostic tests, and medications will improve    Intervention: Explain information regarding disease process/condition, treatment plan, diagnostic tests, and medications and document in education  Discussed POC and planned diagnostic procedure. Verbalized understanding.      Problem: Skin Integrity  Goal: Risk for impaired skin integrity will decrease  Outcome: PROGRESSING AS EXPECTED    Intervention: Assess and monitor skin integrity, appearance and/or temperature  Rash that was present upon admission has decreased in size and in discomfort per pt.

## 2018-01-29 NOTE — PROGRESS NOTES
Off loading shoe was delivered and fitted to patient LLE.  If any further assistance needed, please call extension [phone] or place order for Ortho Technician assistance as a communication order in [employer].

## 2018-01-29 NOTE — DISCHARGE PLANNING
LUNA called [facility] pharmacy and spoke with Yoni. Per Yoni, Xarelto and Digoxin no co-pay, BUT Zyvox requiers prior auth. LUNA requested Yoni to fax SW prior auth info.     Awaiting prior auth fax. Once received, LUNA will fax to [facility] [employer] [facility].

## 2018-01-29 NOTE — CARE PLAN
Problem: Communication  Goal: The ability to communicate needs accurately and effectively will improve  Outcome: PROGRESSING AS EXPECTED  POC discussed with pt. at bedside. All questions and concerns have been addressed at this time. Verbal understanding recieved    Problem: Knowledge Deficit  Goal: Knowledge of the prescribed therapeutic regimen will improve  Outcome: PROGRESSING AS EXPECTED  Verbal education provided to pt. about disease process/condition and corresponding treatment. All questions and concerns have been addressed at this time.

## 2018-01-29 NOTE — PROGRESS NOTES
Assumed care of patient at bedside report from NOC RN. Updated on POC. Patient currently A & O x 4; without complaints of acute pain; on room air; up x1 assist; and renting in bed. Call light within reach. Fall precautions in place. Bed locked and in lowest position. Fall prevention education reinforced. All questions answered. No needs indicated at this time.

## 2018-01-29 NOTE — PROGRESS NOTES
Assumed care of pt. at 1900    Bedside report received from Day Shift RN   POC discussed with pt. At bedside and Pt. verbalizes understanding.  Pt. Is Awake and AOx 4 , resting comfortably on room air, running SR 81 on the monitor  Call light within reach  with appropriate instructions to call for assistance  Bed locked and in lowest position.

## 2018-01-30 ENCOUNTER — PATIENT OUTREACH (OUTPATIENT)
Dept: HEALTH INFORMATION MANAGEMENT | Facility: OTHER | Age: 53
End: 2018-01-30

## 2018-01-30 ASSESSMENT — ENCOUNTER SYMPTOMS: SHORTNESS OF BREATH: T

## 2018-01-30 ASSESSMENT — ACTIVITIES OF DAILY LIVING (ADL): HOME_HEALTH_OASIS: 03

## 2018-01-30 NOTE — DISCHARGE SUMMARY
CHIEF COMPLAINT ON ADMISSION  Chief Complaint   Patient presents with   • Other     red skin on chest after antibiotic   • Rapid Heart Beat     pulse 140s.  a-fib.  no history of a-fib   • Vascular Access Problem     picc line will not draw       CODE STATUS  Full Code    HPI & HOSPITAL COURSE  Patient is a 52-year-old morbidly obese male that has a history of hypertension and diabetes.  He was just discharged from the hospital yesterday.  He was admitted to the hospital and had a great toe amputation and was discharged home on IV daptomycin.  He has a PICC line in place.  Today, home health care nurse went to see him, he felt that the PICC line was not working, but when the nurse observed him and examined him, he was found to have a rapid heart rate and was brought to the hospital.  Today, he complains that he has been having some palpitations and felt his heart racing.  He was found in atrial fibrillation with RVR.  In the emergency department, he has no prior history of this.   Patient received Metoprolol and Digoxin for rate control and had good control at time of discharge.  He was also started on Xarelto for anticoagulation.  Echo was done and showed:  Technically difficult but adequate study for interpretation.   Contrast was used to enhance visualization of the endocardial border.  Normal left ventricular chamber size.  Normal left ventricular systolic function.  Left ventricular ejection fraction is visually estimated to be 60%.  Mild concentric left ventricular hypertrophy.  Trace mitral regurgitation.  Normal left atrial size.  Structurally normal aortic valve without significant stenosis or   regurgitation.  Mild tricuspid regurgitation.  Estimated right ventricular systolic pressure  is 35 mmHg.  Normal inferior vena cava size and inspiratory collapse.  Normal right ventricular size and systolic function    TSH was wnl.  Patient c/o some atypical chest pain for which cardiac stress test was done and  results were negative for ischemia.  Patient had recent amputation of toes for osteomyelitis PTA and was on Daptomycin IV per ID.  He c/o drug rash with Daptomycin for which this was stopped and Zyvox was started instead.  I've discussed the case with Dr. Holley, ID, who was involved with patient 's care prior. Wound care team consulted and continued with wound care while patient is in house.  Patient was advised to f/u with dr. Holley in 1 week along with his PCP.      The patient met 2-midnight criteria for an inpatient stay at the time of discharge.    Therefore, he is discharged in good and stable condition with close outpatient follow-up.    SPECIFIC OUTPATIENT FOLLOW-UP  ID/PCP in 1 week    DISCHARGE PROBLEM LIST  Principal Problem:    Atrial fibrillation (CMS-[facility]) POA: Yes  Active Problems:    Gangrene of toe of right foot, s/p recent amputation (CMS-HCC) POA: Yes    Chest pain POA: No    Type 2 diabetes mellitus (CMS-HCC) POA: Yes    Hypertension POA: Yes  Resolved Problems:    * No resolved hospital problems. *      FOLLOW UP  Future Appointments  Date Time Provider Department Center   2/5/2018 2:50 PM Jerad Lomax M.D. [facility]   2/6/2018 4:50 PM Jerad Lomax M.D. [facility]   2/9/2018 8:00 AM Ronaldo Bernardo M.D. [address]     No follow-up provider specified.    MEDICATIONS ON DISCHARGE   FunmiSebastián rincon   Home Medication Instructions HIRAM:[id number]    Printed on:01/29/18 0722   Medication Information                      aspirin EC 81 MG EC tablet  Take 1 Tab by mouth every day.             digoxin (LANOXIN) 250 MCG Tab  Take 1 Tab by mouth every day at 6 PM.             famotidine (PEPCID) 20 MG Tab  Take 20 mg by mouth every evening.             linezolid (ZYVOX) 600 MG Tab  Take 1 Tab by mouth every 12 hours.             lisinopril (PRINIVIL, ZESTRIL) 40 MG tablet  Take 1 Tab by mouth every day.             metformin (GLUCOPHAGE) 500 MG Tab  Take 2 Tabs by mouth 2 times a day, with  meals.             metoprolol (LOPRESSOR) 50 MG Tab  Take 1 Tab by mouth 2 Times a Day.             nystatin (MYCOSTATIN) powder  Apply to affected area bid             oxycodone immediate-release (ROXICODONE) 5 MG Tab  Take 1 Tab by mouth every 3 hours as needed (Severe Pain (NRS Pain Scale 7-10; CPOT Pain Scale 6-8)) for up to 5 days.             pravastatin (PRAVACHOL) 20 MG Tab  Take 1 Tab by mouth every evening.             rivaroxaban (XARELTO) 20 MG Tab tablet  Take 1 Tab by mouth with dinner.                 DIET  Orders Placed This Encounter   Procedures   • DIET ORDER     Standing Status:   Standing     Number of Occurrences:   1     Order Specific Question:   Diet:     Answer:   Regular [1]     Order Specific Question:   Miscellaneous modifications:     Answer:   No Decaf, No Caffeine(for test) [11]     Comments:   Protocol 1313 Patient to have no caffeine for 12 hours prior to exam (decaf, coffee, cola, tea, chocolate)       ACTIVITY  As tolerated.  Weight bearing as tolerated      CONSULTATIONS  ID    PROCEDURES  See above    LABORATORY  Lab Results   Component Value Date/Time    SODIUM 140 01/26/2018 02:59 AM    POTASSIUM 3.5 (L) 01/26/2018 02:59 AM    CHLORIDE 106 01/26/2018 02:59 AM    CO2 24 01/26/2018 02:59 AM    GLUCOSE 142 (H) 01/26/2018 02:59 AM    BUN 18 01/26/2018 02:59 AM    CREATININE 0.53 01/26/2018 02:59 AM        Lab Results   Component Value Date/Time    WBC 7.2 01/25/2018 06:11 PM    HEMOGLOBIN 12.6 (L) 01/25/2018 06:11 PM    HEMATOCRIT 38.7 (L) 01/25/2018 06:11 PM    PLATELETCT 233 01/25/2018 06:11 PM        Total time of the discharge process exceeds 40 minutes

## 2018-01-30 NOTE — DISCHARGE PLANNING
Received call from [facility] in regards to PA needed for Zyvox.  Called [facility] and was able to receive PA for Zyvox with ref # [id number].  Called [facility] pharmacy and was told they have the Zyvox and Xarelto in stock and they did not require a co-pay.  Called and left VM for [facility].  Called Sarah byrd RN, and explained that PA for Zyvox has been approved and it is available to be picked up today along with Xarelto.  However, Digoxin is not in stock and will not be stocked until tomorrow 1/30/18 after 4pm per pharmacy staff.  Explained to Sarah that patient will need dose of Digoxin before discharge to cover until they can  script tomorrow.

## 2018-01-30 NOTE — DISCHARGE INSTRUCTIONS
Discharge Instructions    Discharged to home by car with friend. Discharged via wheelchair, hospital escort: Refused.  Special equipment needed: Not Applicable    Be sure to schedule a follow-up appointment with your primary care doctor or any specialists as instructed.     Discharge Plan:   Influenza Vaccine Indication: Not indicated: Previously immunized this influenza season and > 8 years of age    I understand that a diet low in cholesterol, fat, and sodium is recommended for good health. Unless I have been given specific instructions below for another diet, I accept this instruction as my diet prescription.   Other diet: diabetic    Special Instructions:None    · Is patient discharged on Warfarin / Coumadin?   No     Atrial Fibrillation  Atrial fibrillation is a type of irregular heart rhythm (arrhythmia). During atrial fibrillation, the upper chambers of the heart (atria) quiver continuously in a chaotic pattern. This causes an irregular and often rapid heart rate.   Atrial fibrillation is the result of the heart becoming overloaded with disorganized signals that tell it to beat. These signals are normally released one at a time by a part of the right atrium called the sinoatrial node. They then travel from the atria to the lower chambers of the heart (ventricles), causing the atria and ventricles to contract and pump blood as they pass. In atrial fibrillation, parts of the atria outside of the sinoatrial node also release these signals. This results in two problems. First, the atria receive so many signals that they do not have time to fully contract. Second, the ventricles, which can only receive one signal at a time, beat irregularly and out of rhythm with the atria.   There are three types of atrial fibrillation:   · Paroxysmal. Paroxysmal atrial fibrillation starts suddenly and stops on its own within a week.  · Persistent. Persistent atrial fibrillation lasts for more than a week. It may stop on its own  or with treatment.  · Permanent. Permanent atrial fibrillation does not go away. Episodes of atrial fibrillation may lead to permanent atrial fibrillation.  Atrial fibrillation can prevent your heart from pumping blood normally. It increases your risk of stroke and can lead to heart failure.   CAUSES   · Heart conditions, including a heart attack, heart failure, coronary artery disease, and heart valve conditions.    · Inflammation of the sac that surrounds the heart (pericarditis).  · Blockage of an artery in the lungs (pulmonary embolism).  · Pneumonia or other infections.  · Chronic lung disease.  · Thyroid problems, especially if the thyroid is overactive (hyperthyroidism).  · Caffeine, excessive alcohol use, and use of some illegal drugs.    · Use of some medicines, including certain decongestants and diet pills.  · Heart surgery.    · Birth defects.    Sometimes, no cause can be found. When this happens, the atrial fibrillation is called lone atrial fibrillation. The risk of complications from atrial fibrillation increases if you have lone atrial fibrillation and you are age 60 years or older.  RISK FACTORS  · Heart failure.  · Coronary artery disease.  · Diabetes mellitus.    · High blood pressure (hypertension).    · Obesity.    · Other arrhythmias.    · Increased age.  SIGNS AND SYMPTOMS   · A feeling that your heart is beating rapidly or irregularly.    · A feeling of discomfort or pain in your chest.    · Shortness of breath.    · Sudden light-headedness or weakness.    · Getting tired easily when exercising.    · Urinating more often than normal (mainly when atrial fibrillation first begins).    In paroxysmal atrial fibrillation, symptoms may start and suddenly stop.  DIAGNOSIS   Your health care provider may be able to detect atrial fibrillation when taking your pulse. Your health care provider may have you take a test called an ambulatory electrocardiogram (ECG). An ECG records your heartbeat patterns  over a 24-hour period. You may also have other tests, such as:  · Transthoracic echocardiogram (TTE). During echocardiography, sound waves are used to evaluate how blood flows through your heart.  · Transesophageal echocardiogram (CÉSAR).  · Stress test. There is more than one type of stress test. If a stress test is needed, ask your health care provider about which type is best for you.  · Chest X-ray exam.  · Blood tests.  · Computed tomography (CT).  TREATMENT   Treatment may include:  · Treating any underlying conditions. For example, if you have an overactive thyroid, treating the condition may correct atrial fibrillation.  · Taking medicine. Medicines may be given to control a rapid heart rate or to prevent blood clots, heart failure, or a stroke.  · Having a procedure to correct the rhythm of the heart:  ¨ Electrical cardioversion. During electrical cardioversion, a controlled, low-energy shock is delivered to the heart through your skin. If you have chest pain, very low blood pressure, or sudden heart failure, this procedure may need to be done as an emergency.  ¨ Catheter ablation. During this procedure, heart tissues that send the signals that cause atrial fibrillation are destroyed.  ¨ Surgical ablation. During this surgery, thin lines of heart tissue that carry the abnormal signals are destroyed. This procedure can either be an open-heart surgery or a minimally invasive surgery. With the minimally invasive surgery, small cuts are made to access the heart instead of a large opening.  ¨ Pulmonary venous isolation. During this surgery, tissue around the veins that carry blood from the lungs (pulmonary veins) is destroyed. This tissue is thought to carry the abnormal signals.  HOME CARE INSTRUCTIONS   · Take medicines only as directed by your health care provider. Some medicines can make atrial fibrillation worse or recur.  · If blood thinners were prescribed by your health care provider, take them exactly as  directed. Too much blood-thinning medicine can cause bleeding. If you take too little, you will not have the needed protection against stroke and other problems.  · Perform blood tests at home if directed by your health care provider. Perform blood tests exactly as directed.  · Quit smoking if you smoke.  · Do not drink alcohol.  · Do not drink caffeinated beverages such as coffee, soda, and some teas. You may drink decaffeinated coffee, soda, or tea.    · Maintain a healthy weight. Do not use diet pills unless your health care provider approves. They may make heart problems worse.    · Follow diet instructions as directed by your health care provider.  · Exercise regularly as directed by your health care provider.  · Keep all follow-up visits as directed by your health care provider. This is important.  PREVENTION   The following substances can cause atrial fibrillation to recur:   · Caffeinated beverages.  · Alcohol.  · Certain medicines, especially those used for breathing problems.  · Certain herbs and herbal medicines, such as those containing ephedra or ginseng.  · Illegal drugs, such as cocaine and amphetamines.  Sometimes medicines are given to prevent atrial fibrillation from recurring. Proper treatment of any underlying condition is also important in helping prevent recurrence.   SEEK MEDICAL CARE IF:  · You notice a change in the rate, rhythm, or strength of your heartbeat.  · You suddenly begin urinating more frequently.  · You tire more easily when exerting yourself or exercising.  SEEK IMMEDIATE MEDICAL CARE IF:   · You have chest pain, abdominal pain, sweating, or weakness.  · You feel nauseous.  · You have shortness of breath.  · You suddenly have swollen feet and ankles.  · You feel dizzy.  · Your face or limbs feel numb or weak.  · You have a change in your vision or speech.  MAKE SURE YOU:   · Understand these instructions.  · Will watch your condition.  · Will get help right away if you are not  doing well or get worse.     This information is not intended to replace advice given to you by your health care provider. Make sure you discuss any questions you have with your health care provider.     Document Released: 12/18/2006 Document Revised: 01/08/2016 Document Reviewed: 04/13/2016  [employer] Interactive Patient Education ©2016 [employer]        Depression / Suicide Risk    As you are discharged from this [facility] facility, it is important to learn how to keep safe from harming yourself.    Recognize the warning signs:  · Abrupt changes in personality, positive or negative- including increase in energy   · Giving away possessions  · Change in eating patterns- significant weight changes-  positive or negative  · Change in sleeping patterns- unable to sleep or sleeping all the time   · Unwillingness or inability to communicate  · Depression  · Unusual sadness, discouragement and loneliness  · Talk of wanting to die  · Neglect of personal appearance   · Rebelliousness- reckless behavior  · Withdrawal from people/activities they love  · Confusion- inability to concentrate     If you or a loved one observes any of these behaviors or has concerns about self-harm, here's what you can do:  · Talk about it- your feelings and reasons for harming yourself  · Remove any means that you might use to hurt yourself (examples: pills, rope, extension cords, firearm)  · Get professional help from the community (Mental Health, Substance Abuse, psychological counseling)  · Do not be alone:Call your Safe Contact- someone whom you trust who will be there for you.  · Call your local CRISIS HOTLINE 662-4843 or 690-463-2928  · Call your local Children's Mobile Crisis Response Team [place] (834) 280-3894 or www.Chartbeat  · Call the toll free National Suicide Prevention Hotlines   · National Suicide Prevention Lifeline 676-441-MWRZ (4433)  · National Hope Line Network 800-SUICIDE (797-4698)

## 2018-01-30 NOTE — DISCHARGE PLANNING
Received call from ELISA Beltran with [facility]. Pts Zivox prior auth has been completed, Ruby updated pts RN.    Received page from pts RN regarding the prior auth.    SW to remain avail able [phone]

## 2018-01-30 NOTE — PROGRESS NOTES
Phoned in the only paper prescription to the [address] [place] [address] pharmacy. Pt dc'd home. IV and monitor removed; monitor room notified. Pt given discharge instructions prior to leaving unit including prescription, where to  medications, and when to visit with physician; verbalizes understanding. Copy of discharge instructions with pt and in the chart.

## 2018-01-30 NOTE — PROGRESS NOTES
Patient discharged home with friend. Discharge education and packet provided. Verbalized understanding. Discharge instructions, prescriptions, and personal belongings collected and sent with patient off unit. Tele box and IV removed. Patient walked off unit without incident.

## 2018-01-30 NOTE — DISCHARGE PLANNING
LUNA received prior auth fax from [facility] pharmacy.     LUNA updated [facility] RN Ruby about prior auth for Zyvox. LUNA faxed prior auth info to Ruby and received fax confirmation. Ruby confirmed receipt of fax.

## 2018-02-01 ENCOUNTER — APPOINTMENT (OUTPATIENT)
Dept: RADIOLOGY | Facility: MEDICAL CENTER | Age: 53
End: 2018-02-01
Attending: EMERGENCY MEDICINE
Payer: MEDICAID

## 2018-02-01 ENCOUNTER — HOSPITAL ENCOUNTER (EMERGENCY)
Facility: MEDICAL CENTER | Age: 53
End: 2018-02-01
Attending: EMERGENCY MEDICINE
Payer: MEDICAID

## 2018-02-01 ENCOUNTER — HOME CARE VISIT (OUTPATIENT)
Dept: HOME HEALTH SERVICES | Facility: HOME HEALTHCARE | Age: 53
End: 2018-02-01
Payer: MEDICAID

## 2018-02-01 VITALS
TEMPERATURE: 97.3 F | HEART RATE: 87 BPM | RESPIRATION RATE: 24 BRPM | BODY MASS INDEX: 37.25 KG/M2 | HEIGHT: 72 IN | OXYGEN SATURATION: 97 % | WEIGHT: 275 LBS

## 2018-02-01 DIAGNOSIS — R07.89 OTHER CHEST PAIN: ICD-10-CM

## 2018-02-01 LAB
EKG IMPRESSION: NORMAL
EKG IMPRESSION: NORMAL

## 2018-02-01 PROCEDURE — 71045 X-RAY EXAM CHEST 1 VIEW: CPT

## 2018-02-01 PROCEDURE — 93005 ELECTROCARDIOGRAM TRACING: CPT | Performed by: EMERGENCY MEDICINE

## 2018-02-01 PROCEDURE — 99284 EMERGENCY DEPT VISIT MOD MDM: CPT

## 2018-02-01 PROCEDURE — 700102 HCHG RX REV CODE 250 W/ 637 OVERRIDE(OP): Performed by: EMERGENCY MEDICINE

## 2018-02-01 PROCEDURE — 36415 COLL VENOUS BLD VENIPUNCTURE: CPT

## 2018-02-01 PROCEDURE — A9270 NON-COVERED ITEM OR SERVICE: HCPCS | Performed by: EMERGENCY MEDICINE

## 2018-02-01 RX ORDER — LINEZOLID 600 MG/1
600 TABLET, FILM COATED ORAL ONCE
Status: COMPLETED | OUTPATIENT
Start: 2018-02-01 | End: 2018-02-01

## 2018-02-01 RX ORDER — LINEZOLID 600 MG/1
600 TABLET, FILM COATED ORAL EVERY 12 HOURS
Qty: 28 TAB | Refills: 0 | Status: SHIPPED | OUTPATIENT
Start: 2018-02-01 | End: 2018-02-02

## 2018-02-01 RX ADMIN — LINEZOLID 600 MG: 600 TABLET, FILM COATED ORAL at 14:08

## 2018-02-01 NOTE — DISCHARGE PLANNING
TC to [address] [place] [address] ([phone]), spoke with Diane, they have not had the Rx in stock, it will be in stock today 2/1/18 at 1500. However, Diane stated that if pt had come in they would have referred pt to another pharmacy that had it in stock and transferred his Rx to that pharmacy, even if it was not a [facility] per Diane.

## 2018-02-01 NOTE — ED TRIAGE NOTES
Chief Complaint   Patient presents with   • Chest Pain     Pt BIB EMS for CP/near syncopal/ pt reports letting self to ground/ denies trauma/ pt with recent admit for a-fib/ pt given ASA/ 2x nitro in field/ 5/10 CP.   • Near Syncopal     ERP at bedside.

## 2018-02-01 NOTE — DISCHARGE PLANNING
Pt has Medicaid and therefore [facility] free medical transport that can take to the pharmacy and home. Pt is aware from last admission.

## 2018-02-01 NOTE — DISCHARGE INSTRUCTIONS
Nonspecific Chest Pain     Continue all your current medications. The only antibiotic on which you were discharged was nystatin powder. Follow-up with Dr. Holley in 5 days. Follow-up with Dr. Lomax. Return for chest pain lasting longer than 10-15 minutes, shortness of breath, fever and cough, calf pain.     Chest pain can be caused by many different conditions. There is always a chance that your pain could be related to something serious, such as a heart attack or a blood clot in your lungs. Chest pain can also be caused by conditions that are not life-threatening. If you have chest pain, it is very important to follow up with your health care provider.  CAUSES   Chest pain can be caused by:  · Heartburn.  · Pneumonia or bronchitis.  · Anxiety or stress.  · Inflammation around your heart (pericarditis) or lung (pleuritis or pleurisy).  · A blood clot in your lung.  · A collapsed lung (pneumothorax). It can develop suddenly on its own (spontaneous pneumothorax) or from trauma to the chest.  · Shingles infection (varicella-zoster virus).  · Heart attack.  · Damage to the bones, muscles, and cartilage that make up your chest wall. This can include:  ¨ Bruised bones due to injury.  ¨ Strained muscles or cartilage due to frequent or repeated coughing or overwork.  ¨ Fracture to one or more ribs.  ¨ Sore cartilage due to inflammation (costochondritis).  RISK FACTORS   Risk factors for chest pain may include:  · Activities that increase your risk for trauma or injury to your chest.  · Respiratory infections or conditions that cause frequent coughing.  · Medical conditions or overeating that can cause heartburn.  · Heart disease or family history of heart disease.  · Conditions or health behaviors that increase your risk of developing a blood clot.  · Having had chicken pox (varicella zoster).  SIGNS AND SYMPTOMS  Chest pain can feel like:  · Burning or tingling on the surface of your chest or deep in your  chest.  · Crushing, pressure, aching, or squeezing pain.  · Dull or sharp pain that is worse when you move, cough, or take a deep breath.  · Pain that is also felt in your back, neck, shoulder, or arm, or pain that spreads to any of these areas.  Your chest pain may come and go, or it may stay constant.  DIAGNOSIS  Lab tests or other studies may be needed to find the cause of your pain. Your health care provider may have you take a test called an ambulatory ECG (electrocardiogram). An ECG records your heartbeat patterns at the time the test is performed. You may also have other tests, such as:  · Transthoracic echocardiogram (TTE). During echocardiography, sound waves are used to create a picture of all of the heart structures and to look at how blood flows through your heart.  · Transesophageal echocardiogram (CÉSAR). This is a more advanced imaging test that obtains images from inside your body. It allows your health care provider to see your heart in finer detail.  · Cardiac monitoring. This allows your health care provider to monitor your heart rate and rhythm in real time.  · Holter monitor. This is a portable device that records your heartbeat and can help to diagnose abnormal heartbeats. It allows your health care provider to track your heart activity for several days, if needed.  · Stress tests. These can be done through exercise or by taking medicine that makes your heart beat more quickly.  · Blood tests.  · Imaging tests.  TREATMENT   Your treatment depends on what is causing your chest pain. Treatment may include:  · Medicines. These may include:  ¨ Acid blockers for heartburn.  ¨ Anti-inflammatory medicine.  ¨ Pain medicine for inflammatory conditions.  ¨ Antibiotic medicine, if an infection is present.  ¨ Medicines to dissolve blood clots.  ¨ Medicines to treat coronary artery disease.  · Supportive care for conditions that do not require medicines. This may include:  ¨ Resting.  ¨ Applying heat or cold  packs to injured areas.  ¨ Limiting activities until pain decreases.  HOME CARE INSTRUCTIONS  · If you were prescribed an antibiotic medicine, finish it all even if you start to feel better.  · Avoid any activities that bring on chest pain.  · Do not use any tobacco products, including cigarettes, chewing tobacco, or electronic cigarettes. If you need help quitting, ask your health care provider.  · Do not drink alcohol.  · Take medicines only as directed by your health care provider.  · Keep all follow-up visits as directed by your health care provider. This is important. This includes any further testing if your chest pain does not go away.  · If heartburn is the cause for your chest pain, you may be told to keep your head raised (elevated) while sleeping. This reduces the chance that acid will go from your stomach into your esophagus.  · Make lifestyle changes as directed by your health care provider. These may include:  ¨ Getting regular exercise. Ask your health care provider to suggest some activities that are safe for you.  ¨ Eating a heart-healthy diet. A registered dietitian can help you to learn healthy eating options.  ¨ Maintaining a healthy weight.  ¨ Managing diabetes, if necessary.  ¨ Reducing stress.  SEEK MEDICAL CARE IF:  · Your chest pain does not go away after treatment.  · You have a rash with blisters on your chest.  · You have a fever.  SEEK IMMEDIATE MEDICAL CARE IF:   · Your chest pain is worse.  · You have an increasing cough, or you cough up blood.  · You have severe abdominal pain.  · You have severe weakness.  · You faint.  · You have chills.  · You have sudden, unexplained chest discomfort.  · You have sudden, unexplained discomfort in your arms, back, neck, or jaw.  · You have shortness of breath at any time.  · You suddenly start to sweat, or your skin gets clammy.  · You feel nauseous or you vomit.  · You suddenly feel light-headed or dizzy.  · Your heart begins to beat quickly, or  it feels like it is skipping beats.  These symptoms may represent a serious problem that is an emergency. Do not wait to see if the symptoms will go away. Get medical help right away. Call your local emergency services (911 in the [place] Do not drive yourself to the hospital.     This information is not intended to replace advice given to you by your health care provider. Make sure you discuss any questions you have with your health care provider.     Document Released: 09/27/2006 Document Revised: 01/08/2016 Document Reviewed: 07/24/2015  [employer] Interactive Patient Education ©2016 [employer]

## 2018-02-01 NOTE — ED PROVIDER NOTES
ED Provider Note    Scribed for Zachary White M.D. by Sommer Woodward. 2/1/2018  12:13 PM    Primary care provider: Jerad Lomax M.D.  Means of arrival: Ambulance  History obtained from: Patient  History limited by: None    CHIEF COMPLAINT  Chest Pain    HPI  Sebastián Castro is a 52 y.o. male who presents to the Emergency Department by ambulance for chest pain with an onset of today. Patient was admitted for atrial fibrillation and discharged on 01/29/18.  Stress test and echocardiogram were completed. Since being discharged, he has had intermittent chest pain. He began to experience an acute onset of substernal chest pain while walking out of the grocery store. Episode of chest pain lasted approximately four minutes and was associated with diaphoresis and nausea. Nitroglycerin and Asprin were administered by EMS resolved his pain. Pain is reproducible with palpation but is otherwise resolved at this time. Associated symptoms include bilateral leg swelling and myalgias.  Negative for fever, shortness of breath, abdominal pain or vomiting. History of hypertension, hyperlipidemia and diabetes. He denies a history of DVT or PE. Significant family history of cardiac disease. Patient adds that he had a recent amputation of his toe and did not receive a prescription for oral antibiotics as planned. He is requesting a script for antibiotics.      REVIEW OF SYSTEMS  Pertinent positives include: substernal chest pain, diaphoresis, nausea, right calf tenderness, bilateral leg swelling and myalgias.  Pertinent negatives include: fever, shortness of breath, abdominal pain or vomiting.  10+ systems reviewed and negative. C.      PAST MEDICAL HISTORY  Past Medical History:   Diagnosis Date   • Diabetes (CMS-HCC)    • Hypertension    History of hyperlipidemia. No prior history of DVT or PE.      FAMILY HISTORY  No family history of blood clots. Significant family history of cardiac disease.      SOCIAL HISTORY  Social History    Substance Use Topics   • Smoking status: Never Smoker   • Smokeless tobacco: Never Used   • Alcohol use No     History   Drug Use No       SURGICAL HISTORY  Past Surgical History:   Procedure Laterality Date   • TOE AMPUTATION Right 1/17/2018    Procedure: TOE AMPUTATION-1ST RAY;  Surgeon: Doug Delong M.D.;  Location: [facility];  Service: Orthopedics   • TOE AMPUTATION Right 11/10/2017    Procedure: TOE AMPUTATION;  Surgeon: Osorio Leo M.D.;  Location: [facility];  Service: Orthopedics       CURRENT MEDICATIONS  No current facility-administered medications for this encounter.     Current Outpatient Prescriptions:   •  rivaroxaban (XARELTO) 20 MG Tab tablet, Take 1 Tab by mouth with dinner., Disp: 30 Tab, Rfl: 0  •  metoprolol (LOPRESSOR) 50 MG Tab, Take 1 Tab by mouth 2 Times a Day., Disp: 60 Tab, Rfl: 0  •  digoxin (LANOXIN) 250 MCG Tab, Take 1 Tab by mouth every day at 6 PM., Disp: 30 Tab, Rfl: 0  •  linezolid (ZYVOX) 600 MG Tab, Take 1 Tab by mouth every 12 hours., Disp: 20 Tab, Rfl: 0  •  nystatin (MYCOSTATIN) powder, Apply to affected area bid, Disp: 15 g, Rfl: 1  •  famotidine (PEPCID) 20 MG Tab, Take 20 mg by mouth every evening., Disp: , Rfl:   •  aspirin EC 81 MG EC tablet, Take 1 Tab by mouth every day., Disp: 30 Tab, Rfl:   •  metformin (GLUCOPHAGE) 500 MG Tab, Take 2 Tabs by mouth 2 times a day, with meals., Disp: 60 Tab, Rfl: 0  •  pravastatin (PRAVACHOL) 20 MG Tab, Take 1 Tab by mouth every evening., Disp: 30 Tab, Rfl: 11  •  lisinopril (PRINIVIL, ZESTRIL) 40 MG tablet, Take 1 Tab by mouth every day., Disp: 30 Tab, Rfl:       ALLERGIES  Allergies   Allergen Reactions   • Pcn [Penicillins] Hives and Rash      Rash & hives  Tolerating Ampicillin + Sulbactam 11/12/17   • Daptomycin Rash     Body rash       PHYSICAL EXAM  VITAL SIGNS: Pulse 93   Temp 36.3 °C (97.3 °F)   Resp 18   Ht 1.829 m (6')   Wt 124.7 kg (275 lb)   SpO2 93%   BMI 37.30 kg/m²   Reviewed and  normal      Constitutional: Well developed, Well nourished. Moderately obese, well-appearing, pain free  HENT: Normocephalic, atraumatic, bilateral external ears normal, oropharynx moist, No exudates or erythema.   Eyes: PERRLA, conjunctiva pink, no scleral icterus.   Cardiovascular: Regular rate and rhythm. No murmurs, rubs or gallops.   Respiratory: Lungs clear to auscultation bilaterally. No wheezes, rales, or rhonchi. Reproducible chest pain with palpation.  Abdominal:  Abdomen soft, non-tender, non distended. No rebound, or guarding.    Skin: No erythema, no rash.   Genitourinary: No costovertebral angle tenderness.   Musculoskeletal: Bilateral lower extremity edema.   Neurologic: Alert & oriented x 3, cranial nerves 2-12 intact by passive exam.  No focal deficit noted.  Psychiatric: Affect normal, Judgment normal, Mood normal.       DIFFERENTIAL DIAGNOSIS:  Chest wall pain, doubt pneumonia, doubt pneumothorax, doubt myocardial ischemia, doubt PE.      EKG Interpretation:  Interpreted by me    Rhythm:  Normal sinus rhythm   Rate: Normal at 87  Axis: normal  Ectopy: none  Conduction:  Intraventricular conduction delay  ST Segments: no acute change  T Waves: no acute change  Q Waves: none  Clinical Impression: Normal sinus rhythm with borderline intraventricular conduction delay      RADIOLOGY/PROCEDURES  DX-CHEST-LIMITED (1 VIEW)   Final Result      Stable cardiomegaly   No acute cardiopulmonary abnormalities are identified.        Radiologist interpretation have been reviewed by me.       INTERVENTIONS:  Old chart reviewed and case discussed with Dr. Esquivel he is discharging hospitalist who stated the patient should've gone home with Zyvox. The prescription was written and given to the  but apparently the  did not arrange for the patient to receive the medication.      ED COURSE:  Pertinent Labs & Imaging studies reviewed. (See chart for details)    12:08 PM Obtained and reviewed  patient's electronic medical record which indicates an admission for atrial fibrillation and discharged on 01/29/18.  Stress test and echocardiogram were completed.    12:13 PM Patient seen and examined at bedside. Patient presents for chest pain.      Initial orders in the Emergency Department included EKG and XR chest.       MEDICAL DECISION MAKING:  Chart reviewed and the patient had normal cardiac stress test on January 29.    Well-appearing patient presents with 4 minutes of chest pain today 2 days after a normal cardiac stress test. EKG is unremarkable. This is likely a chest wall pain.     PLAN:  Continue current medications  Return for chest pain lasting longer than 15 minutes, shortness of breath, fever and cough, calf pain associated with unilateral leg swelling  Chest pain handout given  Zyvox prescription and social work consult    Jerad Lomax M.D.  [address]  [address]  [facility] [id number] [phone]    Schedule an appointment as soon as possible for a visit      Cristy Holley M.D.  [address]  [facility] [id number] [phone]    Schedule an appointment as soon as possible for a visit in 5 days        CONDITION: Table.     FINAL IMPRESSION  1. Other chest pain         Sommer CLARKE (Scribe), am scribing for, and in the presence of, Zachary Wihte M.D.    Electronically signed by: Sommer Woodward (Scribe), 2/1/2018    Zachary CLARKE M.D. personally performed the services described in this documentation, as scribed by Sommer Woodward in my presence, and it is both accurate and complete.    The note accurately reflects work and decisions made by me.  Zachary White  2/1/2018  1:24 PM

## 2018-02-02 ENCOUNTER — APPOINTMENT (OUTPATIENT)
Dept: RADIOLOGY | Facility: MEDICAL CENTER | Age: 53
DRG: 639 | End: 2018-02-02
Attending: EMERGENCY MEDICINE
Payer: MEDICAID

## 2018-02-02 ENCOUNTER — HOSPITAL ENCOUNTER (INPATIENT)
Facility: MEDICAL CENTER | Age: 53
LOS: 3 days | DRG: 639 | End: 2018-02-05
Attending: EMERGENCY MEDICINE | Admitting: HOSPITALIST
Payer: MEDICAID

## 2018-02-02 ENCOUNTER — APPOINTMENT (OUTPATIENT)
Dept: WOUND CARE | Facility: MEDICAL CENTER | Age: 53
End: 2018-02-02
Attending: ORTHOPAEDIC SURGERY
Payer: MEDICAID

## 2018-02-02 ENCOUNTER — RESOLUTE PROFESSIONAL BILLING HOSPITAL PROF FEE (OUTPATIENT)
Dept: HOSPITALIST | Facility: MEDICAL CENTER | Age: 53
End: 2018-02-02
Payer: MEDICAID

## 2018-02-02 DIAGNOSIS — S98.111A AMPUTATED GREAT TOE OF RIGHT FOOT (HCC): ICD-10-CM

## 2018-02-02 DIAGNOSIS — E11.628 DIABETIC FOOT INFECTION (HCC): ICD-10-CM

## 2018-02-02 DIAGNOSIS — L08.9 DIABETIC FOOT INFECTION (HCC): ICD-10-CM

## 2018-02-02 DIAGNOSIS — I82.812 SAPHENOUS VEIN CLOT, LEFT: ICD-10-CM

## 2018-02-02 PROBLEM — E11.9 T2DM (TYPE 2 DIABETES MELLITUS) (HCC): Status: ACTIVE | Noted: 2018-02-02

## 2018-02-02 LAB
ALBUMIN SERPL BCP-MCNC: 3.5 G/DL (ref 3.2–4.9)
ALBUMIN/GLOB SERPL: 1.3 G/DL
ALP SERPL-CCNC: 50 U/L (ref 30–99)
ALT SERPL-CCNC: 14 U/L (ref 2–50)
ANION GAP SERPL CALC-SCNC: 4 MMOL/L (ref 0–11.9)
AST SERPL-CCNC: 14 U/L (ref 12–45)
BASOPHILS # BLD AUTO: 0.6 % (ref 0–1.8)
BASOPHILS # BLD: 0.06 K/UL (ref 0–0.12)
BILIRUB SERPL-MCNC: 0.4 MG/DL (ref 0.1–1.5)
BNP SERPL-MCNC: 56 PG/ML (ref 0–100)
BUN SERPL-MCNC: 14 MG/DL (ref 8–22)
CALCIUM SERPL-MCNC: 8.7 MG/DL (ref 8.5–10.5)
CHLORIDE SERPL-SCNC: 104 MMOL/L (ref 96–112)
CO2 SERPL-SCNC: 28 MMOL/L (ref 20–33)
CREAT SERPL-MCNC: 0.8 MG/DL (ref 0.5–1.4)
DIGOXIN SERPL-MCNC: 0.5 NG/ML (ref 0.8–2)
EOSINOPHIL # BLD AUTO: 0.21 K/UL (ref 0–0.51)
EOSINOPHIL NFR BLD: 2.1 % (ref 0–6.9)
ERYTHROCYTE [DISTWIDTH] IN BLOOD BY AUTOMATED COUNT: 50.7 FL (ref 35.9–50)
EST. AVERAGE GLUCOSE BLD GHB EST-MCNC: 148 MG/DL
GLOBULIN SER CALC-MCNC: 2.6 G/DL (ref 1.9–3.5)
GLUCOSE BLD-MCNC: 123 MG/DL (ref 65–99)
GLUCOSE BLD-MCNC: 135 MG/DL (ref 65–99)
GLUCOSE BLD-MCNC: 148 MG/DL (ref 65–99)
GLUCOSE SERPL-MCNC: 167 MG/DL (ref 65–99)
HBA1C MFR BLD: 6.8 % (ref 0–5.6)
HCT VFR BLD AUTO: 34.7 % (ref 42–52)
HGB BLD-MCNC: 11.2 G/DL (ref 14–18)
IMM GRANULOCYTES # BLD AUTO: 0.04 K/UL (ref 0–0.11)
IMM GRANULOCYTES NFR BLD AUTO: 0.4 % (ref 0–0.9)
LYMPHOCYTES # BLD AUTO: 3.05 K/UL (ref 1–4.8)
LYMPHOCYTES NFR BLD: 30.3 % (ref 22–41)
MCH RBC QN AUTO: 27.6 PG (ref 27–33)
MCHC RBC AUTO-ENTMCNC: 32.3 G/DL (ref 33.7–35.3)
MCV RBC AUTO: 85.5 FL (ref 81.4–97.8)
MONOCYTES # BLD AUTO: 0.75 K/UL (ref 0–0.85)
MONOCYTES NFR BLD AUTO: 7.5 % (ref 0–13.4)
NEUTROPHILS # BLD AUTO: 5.95 K/UL (ref 1.82–7.42)
NEUTROPHILS NFR BLD: 59.1 % (ref 44–72)
NRBC # BLD AUTO: 0 K/UL
NRBC BLD-RTO: 0 /100 WBC
PLATELET # BLD AUTO: 255 K/UL (ref 164–446)
PMV BLD AUTO: 10.2 FL (ref 9–12.9)
POTASSIUM SERPL-SCNC: 3.7 MMOL/L (ref 3.6–5.5)
PROT SERPL-MCNC: 6.1 G/DL (ref 6–8.2)
RBC # BLD AUTO: 4.06 M/UL (ref 4.7–6.1)
SODIUM SERPL-SCNC: 136 MMOL/L (ref 135–145)
TROPONIN I SERPL-MCNC: <0.01 NG/ML (ref 0–0.04)
WBC # BLD AUTO: 10.1 K/UL (ref 4.8–10.8)

## 2018-02-02 PROCEDURE — 80053 COMPREHEN METABOLIC PANEL: CPT

## 2018-02-02 PROCEDURE — 96365 THER/PROPH/DIAG IV INF INIT: CPT

## 2018-02-02 PROCEDURE — A9270 NON-COVERED ITEM OR SERVICE: HCPCS | Performed by: HOSPITALIST

## 2018-02-02 PROCEDURE — 99223 1ST HOSP IP/OBS HIGH 75: CPT | Performed by: HOSPITALIST

## 2018-02-02 PROCEDURE — 700111 HCHG RX REV CODE 636 W/ 250 OVERRIDE (IP): Performed by: EMERGENCY MEDICINE

## 2018-02-02 PROCEDURE — A9270 NON-COVERED ITEM OR SERVICE: HCPCS | Performed by: EMERGENCY MEDICINE

## 2018-02-02 PROCEDURE — 93005 ELECTROCARDIOGRAM TRACING: CPT | Performed by: EMERGENCY MEDICINE

## 2018-02-02 PROCEDURE — 700117 HCHG RX CONTRAST REV CODE 255: Performed by: EMERGENCY MEDICINE

## 2018-02-02 PROCEDURE — 700102 HCHG RX REV CODE 250 W/ 637 OVERRIDE(OP): Performed by: HOSPITALIST

## 2018-02-02 PROCEDURE — 83036 HEMOGLOBIN GLYCOSYLATED A1C: CPT

## 2018-02-02 PROCEDURE — 99285 EMERGENCY DEPT VISIT HI MDM: CPT

## 2018-02-02 PROCEDURE — 73630 X-RAY EXAM OF FOOT: CPT | Mod: RT

## 2018-02-02 PROCEDURE — 82962 GLUCOSE BLOOD TEST: CPT

## 2018-02-02 PROCEDURE — 700105 HCHG RX REV CODE 258: Performed by: HOSPITALIST

## 2018-02-02 PROCEDURE — 84484 ASSAY OF TROPONIN QUANT: CPT

## 2018-02-02 PROCEDURE — 85025 COMPLETE CBC W/AUTO DIFF WBC: CPT

## 2018-02-02 PROCEDURE — 770004 HCHG ROOM/CARE - ONCOLOGY PRIVATE *

## 2018-02-02 PROCEDURE — 93971 EXTREMITY STUDY: CPT

## 2018-02-02 PROCEDURE — 700111 HCHG RX REV CODE 636 W/ 250 OVERRIDE (IP): Performed by: HOSPITALIST

## 2018-02-02 PROCEDURE — 700102 HCHG RX REV CODE 250 W/ 637 OVERRIDE(OP): Performed by: EMERGENCY MEDICINE

## 2018-02-02 PROCEDURE — 71275 CT ANGIOGRAPHY CHEST: CPT

## 2018-02-02 PROCEDURE — 83880 ASSAY OF NATRIURETIC PEPTIDE: CPT

## 2018-02-02 PROCEDURE — 80162 ASSAY OF DIGOXIN TOTAL: CPT

## 2018-02-02 RX ORDER — AMOXICILLIN 250 MG
2 CAPSULE ORAL 2 TIMES DAILY
Status: DISCONTINUED | OUTPATIENT
Start: 2018-02-02 | End: 2018-02-05 | Stop reason: HOSPADM

## 2018-02-02 RX ORDER — LINEZOLID 2 MG/ML
600 INJECTION, SOLUTION INTRAVENOUS ONCE
Status: COMPLETED | OUTPATIENT
Start: 2018-02-02 | End: 2018-02-02

## 2018-02-02 RX ORDER — POLYETHYLENE GLYCOL 3350 17 G/17G
1 POWDER, FOR SOLUTION ORAL
Status: DISCONTINUED | OUTPATIENT
Start: 2018-02-02 | End: 2018-02-05 | Stop reason: HOSPADM

## 2018-02-02 RX ORDER — SODIUM CHLORIDE 9 MG/ML
1000 INJECTION, SOLUTION INTRAVENOUS ONCE
Status: COMPLETED | OUTPATIENT
Start: 2018-02-02 | End: 2018-02-02

## 2018-02-02 RX ORDER — LISINOPRIL 20 MG/1
40 TABLET ORAL DAILY
Status: DISCONTINUED | OUTPATIENT
Start: 2018-02-02 | End: 2018-02-05 | Stop reason: HOSPADM

## 2018-02-02 RX ORDER — LINEZOLID 2 MG/ML
600 INJECTION, SOLUTION INTRAVENOUS EVERY 12 HOURS
Status: DISCONTINUED | OUTPATIENT
Start: 2018-02-02 | End: 2018-02-04

## 2018-02-02 RX ORDER — ACETAMINOPHEN 325 MG/1
650 TABLET ORAL EVERY 6 HOURS PRN
Status: DISCONTINUED | OUTPATIENT
Start: 2018-02-02 | End: 2018-02-05 | Stop reason: HOSPADM

## 2018-02-02 RX ORDER — BISACODYL 10 MG
10 SUPPOSITORY, RECTAL RECTAL
Status: DISCONTINUED | OUTPATIENT
Start: 2018-02-02 | End: 2018-02-05 | Stop reason: HOSPADM

## 2018-02-02 RX ORDER — OXYCODONE HYDROCHLORIDE AND ACETAMINOPHEN 5; 325 MG/1; MG/1
1 TABLET ORAL ONCE
Status: COMPLETED | OUTPATIENT
Start: 2018-02-02 | End: 2018-02-02

## 2018-02-02 RX ORDER — DIGOXIN 250 MCG
250 TABLET ORAL DAILY
Status: DISCONTINUED | OUTPATIENT
Start: 2018-02-02 | End: 2018-02-05 | Stop reason: HOSPADM

## 2018-02-02 RX ORDER — DEXTROSE MONOHYDRATE 25 G/50ML
25 INJECTION, SOLUTION INTRAVENOUS
Status: DISCONTINUED | OUTPATIENT
Start: 2018-02-02 | End: 2018-02-05 | Stop reason: HOSPADM

## 2018-02-02 RX ORDER — PRAVASTATIN SODIUM 20 MG
20 TABLET ORAL EVERY EVENING
Status: DISCONTINUED | OUTPATIENT
Start: 2018-02-02 | End: 2018-02-05 | Stop reason: HOSPADM

## 2018-02-02 RX ORDER — LINEZOLID 2 MG/ML
600 INJECTION, SOLUTION INTRAVENOUS EVERY 12 HOURS
Status: DISCONTINUED | OUTPATIENT
Start: 2018-02-02 | End: 2018-02-02

## 2018-02-02 RX ADMIN — LINEZOLID 600 MG: 600 INJECTION, SOLUTION INTRAVENOUS at 21:59

## 2018-02-02 RX ADMIN — IOHEXOL 90 ML: 350 INJECTION, SOLUTION INTRAVENOUS at 04:17

## 2018-02-02 RX ADMIN — RIVAROXABAN 20 MG: 20 TABLET, FILM COATED ORAL at 18:26

## 2018-02-02 RX ADMIN — SODIUM CHLORIDE 1000 ML: 9 INJECTION, SOLUTION INTRAVENOUS at 06:33

## 2018-02-02 RX ADMIN — METOPROLOL TARTRATE 50 MG: 50 TABLET, FILM COATED ORAL at 09:49

## 2018-02-02 RX ADMIN — OXYCODONE AND ACETAMINOPHEN 1 TABLET: 5; 325 TABLET ORAL at 03:50

## 2018-02-02 RX ADMIN — METOPROLOL TARTRATE 50 MG: 50 TABLET, FILM COATED ORAL at 19:25

## 2018-02-02 RX ADMIN — STANDARDIZED SENNA CONCENTRATE AND DOCUSATE SODIUM 2 TABLET: 8.6; 5 TABLET, FILM COATED ORAL at 19:25

## 2018-02-02 RX ADMIN — PRAVASTATIN SODIUM 20 MG: 20 TABLET ORAL at 19:25

## 2018-02-02 RX ADMIN — DIGOXIN 250 MCG: 250 TABLET ORAL at 18:26

## 2018-02-02 RX ADMIN — LINEZOLID 600 MG: 600 INJECTION, SOLUTION INTRAVENOUS at 06:33

## 2018-02-02 RX ADMIN — LISINOPRIL 40 MG: 20 TABLET ORAL at 09:47

## 2018-02-02 RX ADMIN — ASPIRIN 81 MG: 81 TABLET, COATED ORAL at 09:47

## 2018-02-02 ASSESSMENT — ENCOUNTER SYMPTOMS
SHORTNESS OF BREATH: 0
SORE THROAT: 0
COUGH: 0
FOCAL WEAKNESS: 0
CHILLS: 1
DIARRHEA: 0
PALPITATIONS: 0
NAUSEA: 0
MYALGIAS: 0
VOMITING: 0
FEVER: 0
TINGLING: 0
PHOTOPHOBIA: 0
DIZZINESS: 0
HEADACHES: 0
DEPRESSION: 0
ABDOMINAL PAIN: 0
WHEEZING: 0

## 2018-02-02 ASSESSMENT — CHA2DS2 SCORE
CHF OR LEFT VENTRICULAR DYSFUNCTION: NO
AGE 65 TO 74: NO
DIABETES: YES
VASCULAR DISEASE: NO
PRIOR STROKE OR TIA OR THROMBOEMBOLISM: NO
HYPERTENSION: YES
AGE 75 OR GREATER: NO
CHA2DS2 VASC SCORE: 2
SEX: MALE

## 2018-02-02 ASSESSMENT — PATIENT HEALTH QUESTIONNAIRE - PHQ9
1. LITTLE INTEREST OR PLEASURE IN DOING THINGS: NOT AT ALL
SUM OF ALL RESPONSES TO PHQ QUESTIONS 1-9: 0
SUM OF ALL RESPONSES TO PHQ9 QUESTIONS 1 AND 2: 0
2. FEELING DOWN, DEPRESSED, IRRITABLE, OR HOPELESS: NOT AT ALL

## 2018-02-02 ASSESSMENT — PAIN SCALES - GENERAL
PAINLEVEL_OUTOF10: 6
PAINLEVEL_OUTOF10: 0

## 2018-02-02 ASSESSMENT — LIFESTYLE VARIABLES
ALCOHOL_USE: NO
EVER_SMOKED: NEVER
EVER_SMOKED: NEVER

## 2018-02-02 ASSESSMENT — COPD QUESTIONNAIRES
DO YOU EVER COUGH UP ANY MUCUS OR PHLEGM?: NO/ONLY WITH OCCASIONAL COLDS OR INFECTIONS
DURING THE PAST 4 WEEKS HOW MUCH DID YOU FEEL SHORT OF BREATH: NONE/LITTLE OF THE TIME
HAVE YOU SMOKED AT LEAST 100 CIGARETTES IN YOUR ENTIRE LIFE: NO/DON'T KNOW
COPD SCREENING SCORE: 1

## 2018-02-02 NOTE — ED NOTES
Med rec completed by interview with patient at bedside  Pt did not  Linezolid yesterday from the pharmacy and has not started that medication at home  Pt reported taking all of his medications last night  Allergies reviewed

## 2018-02-02 NOTE — ASSESSMENT & PLAN NOTE
Zyvox change to PO- has blown 2 IV's today  Dr. Delong of orthopedic surgery has been consulted - [facility] care consult  PO Zyvox prior authorization obtained, prescription filled and waiting for patient at pharmacy [address] [place] [address].

## 2018-02-02 NOTE — ASSESSMENT & PLAN NOTE
This is chronic, currently stable with controlled rates.   Continue home metoprolol, digoxin, and xarelto.

## 2018-02-02 NOTE — ED NOTES
Report received from Melisa PÉREZ, assumed care of patient.  White board updated, POC discussed.  Call light and belongings within reach.  Bed in lowest position.

## 2018-02-02 NOTE — ED PROVIDER NOTES
ED Provider Note    Scribed for Alissa Garcia M.D. by Handy Farrell. 2/2/2018, 2:56 AM.    Primary care provider: Jerad Lomax M.D.  Means of arrival: Ambulance  History obtained from: Patient  History limited by: None    CHIEF COMPLAINT  Right Foot Pain    MARGUERITE Castro is a 52 y.o. male who presents to the Emergency Department for right foot pain. The patient had a procedure by Dr. Delong, Orthopedic surgeon, for a bone infection of his right foot at [facility]. It was conducted 16 days ago and he has been recovering well since then. However, the patient woke up tonight due to pains in the foot and found it swollen and warm. He notes having chest pain and SOB earlier yesterday and came to the ER and was discharged. States the chest pain and shortness of breath have improved since he was seen here previously. He also had nausea earlier in the day which resolved by itself. Denies leg pain or swelling. Denies any other rashes or lesions, denies any fevers. Patient has a history of hypertension and hyperlipidemia. He does have multiple recent admissions for diabetic infection, as well as cardiac issues including atrial fibrillation.     He is currently anticoagulated on Xarelto, currently taking digoxin.    REVIEW OF SYSTEMS  Pertinent positives include right foot pain, nausea (resolved), chest pain (resolved), shortness of breath (resolved). Pertinent negatives include no leg pain, swelling.  All other systems reviewed and negative.    C.    PAST MEDICAL HISTORY   has a past medical history of A-fib (CMS-[facility]); Chest pain; Diabetes (CMS-[facility]); Diabetic neuropathy (CMS-HCC); Hypercholesterolemia; Hypertension; Morbid obesity (CMS-HCC); Noncompliance; and Normal cardiac stress test.    SURGICAL HISTORY   has a past surgical history that includes toe amputation (Right, 11/10/2017) and toe amputation (Right, 1/17/2018).    SOCIAL HISTORY  Social History   Substance Use Topics   • Smoking status: Never Smoker   •  Smokeless tobacco: Never Used   • Alcohol use No      History   Drug Use No     FAMILY HISTORY  History reviewed. No pertinent family history.    CURRENT MEDICATIONS  Home Medications     Reviewed by Citlaly Rai R.N. (Registered Nurse) on 02/02/18 at 0110  Med List Status: Partial   Medication Last Dose Status   aspirin EC 81 MG EC tablet 1/25/2018 Active   digoxin (LANOXIN) 250 MCG Tab  Active   famotidine (PEPCID) 20 MG Tab 1/24/2018 Active   linezolid (ZYVOX) 600 MG Tab  Active   lisinopril (PRINIVIL, ZESTRIL) 40 MG tablet 1/25/2018 Active   metformin (GLUCOPHAGE) 500 MG Tab 1/25/2018 Active   metoprolol (LOPRESSOR) 50 MG Tab  Active   nystatin (MYCOSTATIN) powder 1/25/2018 Active   pravastatin (PRAVACHOL) 20 MG Tab 1/24/2018 Active   rivaroxaban (XARELTO) 20 MG Tab tablet  Active              ALLERGIES  Allergies   Allergen Reactions   • Pcn [Penicillins] Hives and Rash      Rash & hives  Tolerating Ampicillin + Sulbactam 11/12/17   • Daptomycin Rash     Body rash     PHYSICAL EXAM  Vital Signs: /69   Pulse 77   Temp 36.4 °C (97.6 °F)   Resp 16   Ht 1.829 m (6')   Wt 124.7 kg (274 lb 14.6 oz)   SpO2 93%   BMI 37.29 kg/m²   Constitutional: Alert, no acute distress, obese, exam limited by body habitus  HENT: Normocephalic, atraumatic, moist mucus membranes  Eyes: Pupils equal and reactive, normal conjunctiva, non-icteric  Neck: Supple, normal range of motion, no stridor  Cardiovascular: Regular rhythm, Normal peripheral perfusion, no cyanosis, Normal cardiac auscultation  Pulmonary: No respiratory distress, normal work of breathing, no accessory muscle usage, Clear to auscultation bilaterally  Abdomen: Soft, non tender, no peritoneal signs, bowel sounds are present.   Skin: Warm, dry. See musculoskeletal noted below.  Back: No pain with active range of motion  Musculoskeletal: Healing incision along the medial aspect of the right foot. Proximal aspect of incision with mild skin breakdown. Mild  surrounding erythema. No fluctuance. No drainage. Sutures are in place. Great toe amputated. Right lower extremity is nontender to palpation, no posterior calf or thigh tenderness, no unilateral edema noted.  Neurologic: Alert, oriented, normal motor function, no speech deficits  Psychiatric: Normal and appropriate mood and affect    DIAGNOSTIC STUDIES/PROCEDURES:    LABS  Labs Reviewed   CBC WITH DIFFERENTIAL - Abnormal; Notable for the following:        Result Value    RBC 4.06 (*)     Hemoglobin 11.2 (*)     Hematocrit 34.7 (*)     MCHC 32.3 (*)     RDW 50.7 (*)     All other components within normal limits   COMP METABOLIC PANEL - Abnormal; Notable for the following:     Glucose 167 (*)     All other components within normal limits   DIGOXIN - Abnormal; Notable for the following:     Digoxin 0.5 (*)     All other components within normal limits   BTYPE NATRIURETIC PEPTIDE   TROPONIN   ESTIMATED GFR     All labs reviewed by me.    EKG  12 Lead EKG interpreted by me to show:  Rate 70, no ST elevations or depressions, no T-wave inversions, no ectopy, P waves are present with normal sinus rhythm    Radiology results revealed:   LE VENOUS DUPLEX (Specify in Comments Left, Right Or Bilateral)         DX-FOOT-COMPLETE 3+ RIGHT   Final Result      No acute bony abnormality.      CT-CTA CHEST PULMONARY ARTERY W/ RECONS   Final Result      1.  No evidence of pulmonary emboli or other acute intrathoracic abnormality.   2.  Cholelithiasis.   3.  Gynecomastia.                 COURSE & MEDICAL DECISION MAKING  Pertinent Labs & Imaging studies reviewed. (See chart for details)    Review of old medical records for continuity of care. Patient seen in this emergency department yesterday for chest pain. History of atrial fibrillation with admission and discharge 1/29/18. Stress test and echocardiogram completed at that time. Patient discharged on Zyvox from the emergency department, he was supposed to be given this medication on  discharge from his inpatient stay, however it appears he may not been given the prescription. EKG unremarkable. Presumed musculoskeletal pain.    Discharge summary reviewed from 1/29/18. Patient was admitted for diabetic osteomyelitis. Discharged home on IV daptomycin. PICC line in place. Presented to the emergency department in A. fib with RVR, treated with metoprolol and digoxin for rate control. Started on Xarelto for anticoagulation.    Operative report reviewed from 1/15/18. Patient underwent a prior right great toe amputation with Dr. Horvath for diabetic infection in November 2017. This wound did not ever heal completely. Developed a subsequent infection, underwent right 1st ray amputation performed by Dr. Delong on 1/17/18.    Differential diagnoses include but are not limited to: Diabetic foot infection, DVT, pulmonary embolism, osteomyelitis    2:56 AM - Patient seen and examined at bedside. Patient will be treated with 1 Tab Percocet. Ordered LE Venous Duplex, CT-CTA Chest, CBC, CMP, BNP, Troponin STAT to evaluate his symptoms.     Decision Making:  This is a 52 y.o. year old male who presents with complaint of worsening right foot pain that began this evening, as well as redness surrounding an area of prior surgical procedure. He is POD #16 s/p right first ray amputation performed by Dr. Delong. He developed worsening pain earlier this evening at the site of the wound. Additionally he states the wound is more reddened and swollen.He was recently admitted to this facility, discharged on January 28. He was supposed to be discharged on Zyvox at that time, however this medication or prescription was not provided at time of discharge. He has not been taking his Zyvox since that time. Prescription was provided when he was discharged yesterday. Pain and swelling resulted shortly after time of discharge. It is possible this is a worsening infection in the setting of inadequate antimicrobial therapy.     On review  of systems we discussed his chest pain for which he was seen and evaluated in this emergency department yesterday. He no longer has complaint of chest pain. He did have a recent cardiac workup that was negative. Due to his multiple hospitalizations, he is at high risk for DVT and pulmonary embolism. CTA and right lower extremity ultrasound were ordered to evaluate this process. On physical exam he is afebrile, he has no hypotension, tachycardia, no evidence of SIRS or sepsis. Right foot with some mild skin breakdown and erythema surrounding the incision site.    On laboratory evaluation CMP is within normal limits excepting glucose of 167. Troponin is negative. Digoxin level is low at 0.5. No evidence of digoxin toxicity. White blood count is 10.1, less concerning for systemic infection. BNP is 56, no evidence of heart failure exacerbation.    CT of the chest is negative for evidence of pulmonary embolism or other intrathoracic abnormality. Plain film of the foot demonstrates no acute bony abnormality. Generalized soft tissue swelling about the distal foot is noted on the plain film. Right lower extremity ultrasound demonstrates a chronic venous thrombosis in the greater saphenous vein. Flow is seen, vein is partially compressible. Continued anticoagulation on Xarelto is appropriate therapy at this time.    I discussed the case with Dr. Perez, orthopedic surgeon. My plan at this time is for admission to restart the patient's Zyvox, Dr. Perez is in agreement with that plan. Additionally he recommends wound care consult, and the patient will be evaluated by a member of the orthopedic team this morning.    I discussed the case with Dr. Rowe, [facility] hospitalist, who kindly agrees to admit the patient.    Admit to hospitalist in guarded condition    FINAL IMPRESSION  1. Diabetic foot infection (CMS-HCC)    2. Saphenous vein clot, left          I, Handy Farrell (Scribe), am scribing for, and in the presence of, Alissa MCCLOUD  JEFFREY Garcia.    Electronically signed by: Handy Farrell (Scribe), 2/2/2018    IAlissa M.D. personally performed the services described in this documentation, as scribed by Handy Farrell in my presence, and it is both accurate and complete.    The note accurately reflects work and decisions made by me.  Alissa Garcia  2/2/2018  5:36 AM

## 2018-02-02 NOTE — ED TRIAGE NOTES
Sebastián Castro  52 y.o.  Chief Complaint   Patient presents with   • Foot Problem     2 weeks post-op from foot surgery, states that his foot looks more swollen and is more painful than normal     BIB EMS to triage for above. Patient states he took home Norco without relief. Fingerstick for .    Patient hospitalized here for a-fib, discharged 1/29/18. Patient seen in this ED yesterday.    Triage process explained to patient, apologized for wait time, and returned to [facility]ia.

## 2018-02-02 NOTE — H&P
Hospital Medicine History and Physical    Date of Service  2/2/2018    Chief Complaint  Chief Complaint   Patient presents with   • Foot Problem     2 weeks post-op from foot surgery, states that his foot looks more swollen and is more painful than normal       History of Presenting Illness  52 y.o. male who presented on 2/2/2018 with Right foot pain and swelling. The patient carries a history of diabetic foot infection and underwent a right great toe amputation in November 2017 with subsequent revision. He was discharged from the hospital on January 29 and was followed by infectious disease during that hospitalization for wound cultures that were positive for enterococcus facaelus as well as MRSA. He had been sent home on oral Zyvox as recommended by ID. He was seen in the hospital yesterday for chest pain and had been sent home from the emergency room. During the course of that visit, it was noted that he had not been on his Zyvox. He reported that his prescription was never called in for him. However review of the medical records show that the discharging hospitalist sent this prescription electronically to the [facility] on [address] [place] [address] and social work was involved to obtain preauthorization which was approved. When asked about this, the patient states that he was told the medication would not be ready for 2-3 days he did not seek medical assistance of another physician to request alternatives. When he was seen yesterday in the emergency room for chest pain, he was given a one-time dose of Zyvox and a new prescription for Zyvox which he again failed to fill. After he was discharged from the emergency room, he states he began to have right foot swelling, erythema, and pain. He noticed some drainage from the surgical wound when he changed his dressings. He reports chills but no fevers. Otherwise, he denies any current headaches, chest pain, shortness of breath, abdominal pain, diarrhea or dysuria.      Primary Care  Physician  Jerad Lomax M.D.    Consultants  Dr. Perez, orthopedic surgery    Code Status  Full    Review of Systems  Review of Systems   Constitutional: Positive for chills. Negative for fever.   HENT: Negative for congestion and sore throat.    Eyes: Negative for photophobia.   Respiratory: Negative for cough, shortness of breath and wheezing.    Cardiovascular: Negative for chest pain and palpitations.   Gastrointestinal: Negative for abdominal pain, diarrhea, nausea and vomiting.   Genitourinary: Negative for dysuria.   Musculoskeletal: Negative for myalgias.        Right foot pain   Skin: Negative.         Redness and erythema to the surgical wound on the right foot, swelling, drainage.   Neurological: Negative for dizziness, tingling, focal weakness and headaches.   Psychiatric/Behavioral: Negative for depression and suicidal ideas.        Past Medical History  Past Medical History:   Diagnosis Date   • A-fib (CMS-[facility])    • Chest pain    • Diabetes (CMS-[facility])    • Diabetic neuropathy (CMS-HCC)    • Hypercholesterolemia    • Hypertension    • Morbid obesity (CMS-HCC)    • Noncompliance    • Normal cardiac stress test        Surgical History  Past Surgical History:   Procedure Laterality Date   • TOE AMPUTATION Right 1/17/2018    Procedure: TOE AMPUTATION-1ST RAY;  Surgeon: Doug Delong M.D.;  Location: SURGERY [facility];  Service: Orthopedics   • TOE AMPUTATION Right 11/10/2017    Procedure: TOE AMPUTATION;  Surgeon: Osorio Leo M.D.;  Location: SURGERY [facility];  Service: Orthopedics       Medications  No current facility-administered medications on file prior to encounter.      Current Outpatient Prescriptions on File Prior to Encounter   Medication Sig Dispense Refill   • linezolid (ZYVOX) 600 MG Tab Take 1 Tab by mouth every 12 hours. 28 Tab 0   • rivaroxaban (XARELTO) 20 MG Tab tablet Take 1 Tab by mouth with dinner. 30 Tab 0   • metoprolol (LOPRESSOR) 50 MG Tab Take 1 Tab by mouth 2 Times  a Day. 60 Tab 0   • digoxin (LANOXIN) 250 MCG Tab Take 1 Tab by mouth every day at 6 PM. 30 Tab 0   • nystatin (MYCOSTATIN) powder Apply to affected area bid 15 g 1   • famotidine (PEPCID) 20 MG Tab Take 20 mg by mouth every evening.     • aspirin EC 81 MG EC tablet Take 1 Tab by mouth every day. 30 Tab    • metformin (GLUCOPHAGE) 500 MG Tab Take 2 Tabs by mouth 2 times a day, with meals. 60 Tab 0   • pravastatin (PRAVACHOL) 20 MG Tab Take 1 Tab by mouth every evening. 30 Tab 11   • lisinopril (PRINIVIL, ZESTRIL) 40 MG tablet Take 1 Tab by mouth every day. 30 Tab        Family History  History reviewed. No pertinent family history.    Social History  Social History   Substance Use Topics   • Smoking status: Never Smoker   • Smokeless tobacco: Never Used   • Alcohol use No       Allergies  Allergies   Allergen Reactions   • Pcn [Penicillins] Hives and Rash      Rash & hives  Tolerating Ampicillin + Sulbactam 17   • Daptomycin Rash     Body rash        Physical Exam  Laboratory   Hemodynamics  Temp (24hrs), Av.4 °C (97.6 °F), Min:36.4 °C (97.6 °F), Max:36.4 °C (97.6 °F)   Temperature: 36.4 °C (97.6 °F)  Pulse  Av  Min: 70  Max: 77    Blood Pressure: 148/69, NIBP: 159/80      Respiratory      Respiration: 16, Pulse Oximetry: 98 %             Physical Exam   Constitutional: He is oriented to person, place, and time. No distress.   Morbidly obese, chronically ill-appearing   HENT:   Head: Normocephalic and atraumatic.   Right Ear: External ear normal.   Left Ear: External ear normal.   Eyes: EOM are normal. Right eye exhibits no discharge. Left eye exhibits no discharge.   Neck: Neck supple. No JVD present.   Cardiovascular: Normal rate, regular rhythm and normal heart sounds.    Distant secondary to hepatitis   Pulmonary/Chest: Effort normal and breath sounds normal. No respiratory distress. He exhibits no tenderness.   Abdominal: Soft. Bowel sounds are normal. He exhibits no distension. There is no  tenderness.   Musculoskeletal: He exhibits no edema.   Neurological: He is alert and oriented to person, place, and time. No cranial nerve deficit.   Skin: Skin is dry. He is not diaphoretic. No erythema.   Patient is status post right great toe amputation with recent revision. The stitches are dry but there is some moisture surrounding the surgical wound, there is also erythema, warmth, but no fluctuance or purulence. The patient's foot is also very dirty.   Psychiatric: He has a normal mood and affect. His behavior is normal.   Nursing note and vitals reviewed.    Capillary refill less than 3 seconds, distal pulses intact    Recent Labs      02/02/18   0345   WBC  10.1   RBC  4.06*   HEMOGLOBIN  11.2*   HEMATOCRIT  34.7*   MCV  85.5   MCH  27.6   MCHC  32.3*   RDW  50.7*   PLATELETCT  255   MPV  10.2     Recent Labs      02/02/18   0345   SODIUM  136   POTASSIUM  3.7   CHLORIDE  104   CO2  28   GLUCOSE  167*   BUN  14   CREATININE  0.80   CALCIUM  8.7     Recent Labs      02/02/18   0345   ALTSGPT  14   ASTSGOT  14   ALKPHOSPHAT  50   TBILIRUBIN  0.4   GLUCOSE  167*         Recent Labs      02/02/18   0345   BNPBTYPENAT  56         Lab Results   Component Value Date    TROPONINI <0.01 02/02/2018       Imaging  Dx-chest-for Picc Line Perform Procedure In: Patient's Room    Result Date: 1/20/2018 1/20/2018 11:02 PM HISTORY/REASON FOR EXAM:  PICC line placement. TECHNIQUE/EXAM DESCRIPTION AND NUMBER OF VIEWS: Single view of the chest. COMPARISON: 11/20/2017 FINDINGS: Cardiac mediastinal contour is unchanged. Lungs show hypoinflation. Pulmonary vessels are again mildly prominent. No pleural fluid collection or pneumothorax. LEFT arm PICC line in place with tip at the mid SVC. No major bony abnormality is seen.     1.  Supportive tubing as described above. 2.  No pneumonia or pneumothorax. 3.  Pulmonary vasculature is again mildly prominent.    Dx-chest-limited (1 View)    Result Date: 2/1/2018 2/1/2018 1:00 PM  HISTORY/REASON FOR EXAM:  Chest Pain Right chest pain TECHNIQUE/EXAM DESCRIPTION AND NUMBER OF VIEWS: Single portable view of the chest. COMPARISON: 1/26/2018 FINDINGS: No pulmonary infiltrates or consolidations are noted. No pleural effusion. No pneumothorax. Stable enlarged cardiopericardial silhouette.     Stable cardiomegaly No acute cardiopulmonary abnormalities are identified.    Dx-chest-limited (1 View)    Addendum Date: 1/26/2018    Addendum: Left PICC line is seen terminating overlying the expected location of the proximal superior vena cava    Result Date: 1/26/2018 1/26/2018 3:16 AM HISTORY/REASON FOR EXAM:  PICC line placement TECHNIQUE/EXAM DESCRIPTION:  Single AP view of the chest. COMPARISON: January 20, 2018 FINDINGS: Overlying cardiac leads are present. The heart is in the upper limits of normal for size. The mediastinal contour appears within normal limits.  The central pulmonary vasculature appears normal. The lungs appear well expanded bilaterally.  Bilateral lungs are clear. No significant pleural effusions are identified. The bony structures appear age-appropriate.     1.  No acute cardiopulmonary disease.    Dx-foot-complete 3+ Right    Result Date: 2/2/2018 2/2/2018 4:40 AM HISTORY/REASON FOR EXAM:  Right foot pain.. TECHNIQUE/EXAM DESCRIPTION AND NUMBER OF VIEWS:  3 views of the RIGHT foot. COMPARISON: 1/15/2018 FINDINGS: There is evidence of amputation through the proximal shaft of the first metatarsal, which has occurred since the prior images. There is generalized soft tissue swelling about the distal foot. No soft tissue gas however. No acute bony abnormality.     No acute bony abnormality.    Dx-foot-complete 3+ Right    Result Date: 1/15/2018  1/15/2018 1:07 PM HISTORY/REASON FOR EXAM:  Atraumatic Pain/Swelling/Deformity Right foot pain s/p Great toe amputation. Pt had a recent toe amputation on the right foot. Home health nurse checked his foot this morning and sts his open wound  is full thickness and needed to come in TECHNIQUE/EXAM DESCRIPTION AND NUMBER OF VIEWS: 3 views of the RIGHT foot. COMPARISON:  11/10/2017 FINDINGS: Prior amputation of great toe at the level of the metatarsophalangeal joint. Focal soft tissue swelling over the distal 1st metatarsal.  Lucency within soft tissues consistent with ulceration.  No soft tissue gas or radiopaque foreign body.  No focal bony destruction. Degenerative change of the midfoot. Calcaneal enthesophytes present. Forefoot and ankle soft tissue swelling.     1.  Prior great toe amputation. 2.  RIGHT forefoot soft tissue swelling with ulceration overlying the distal 1st metatarsal head. 3.  Forefoot and ankle soft tissue swelling. 4.  No gross bone destruction, however osteomyelitis is not excluded by plain film alone.    Mr-foot-with Right    Result Date: 1/17/2018 1/16/2018 10:19 PM HISTORY/REASON FOR EXAM: Great toe amputation in November. Skin breakdown over the medial margin of the stump TECHNIQUE/EXAM DESCRIPTION: MRI of the RIGHT foot with and without contrast. Using a Memopal Signa 1.5 Taya MRI scanner, T1 axial, sagittal, and coronal, fast spin-echo T2 fat-suppressed axial and coronal, fast inversion recovery sagittal, and T1 fat suppressed axial and sagittal post intravenous contrast images were obtained. A total of 20 mL Omniscan given. COMPARISON:  1/15/2018 radiographs FINDINGS: Deep ulceration overlies the anteromedial margin of the great toe amputation with a region of devascularized tissue extending to the medial metatarsal head margin, 13 mm deep. The devascularized tissue measures 22 x 14 mm sagittal with exuberant marginal  enhancement of the ulceration cavity. There is adjacent fat edema which also enhances. Skin enhancement is seen. The underlying medial metatarsal head shows some cortical irregularity with partial fatty marrow replacement, edema and enhancement. This is consistent with osteomyelitis. Abnormal signal is seen in  the anterior 23 mm. The mid and proximal first metatarsal shaft are normal. Similar edema and enhancement is seen in the medial sesamoid without clear bony destruction No soft tissue abscess is seen No fracture or subluxation is noted No abnormal stress response is seen No septic arthropathy is identified on the basis of no abnormal effusion     First metatarsal head edema and enhancement is compatible with osteomyelitis, seen at the deep margin of deep skin ulceration at the amputation stump Great toe amputation Cellulitis, granulation tissue, phlegmonous change. No abscess Medial first metatarsal head sesamoid edema and enhancement could be reactive or from osteomyelitis    Ir-picc Line Placement > Age 5    Result Date: 1/19/2018  HISTORY/REASON FOR EXAM:   PICC placement. TECHNIQUE/EXAM DESCRIPTION AND NUMBER OF VIEWS:   PICC line insertion with ultrasound guidance.  The procedure was performed using maximal sterile barrier technique including sterile gown, mask, cap, and donning of sterile gloves following appropriate hand hygiene and/or sterile scrub. Patient skin site was prepped with 2% Chlorhexidine solution. FINDINGS:  PICC line insertion with Ultrasound Guidance was performed by qualified nursing staff without the assistance of a Radiologist.  A follow up chest x-ray will be performed to determine appropriateness of PICC positioning.              Ultrasound-guided PICC placement performed by qualified nursing staff as above.     Echocardiogram-comp W/ Cont    Result Date: 1/27/2018  Transthoracic Echo Report Echocardiography Laboratory CONCLUSIONS Technically difficult but adequate study for interpretation. Contrast was used to enhance visualization of the endocardial border. Normal left ventricular chamber size. Normal left ventricular systolic function. Left ventricular ejection fraction is visually estimated to be 60%. Mild concentric left ventricular hypertrophy. Trace mitral regurgitation. Normal left  atrial size. Structurally normal aortic valve without significant stenosis or regurgitation. Mild tricuspid regurgitation. Estimated right ventricular systolic pressure  is 35 mmHg. Normal inferior vena cava size and inspiratory collapse. Normal right ventricular size and systolic function. EMELY FLORES Exam Date:         2018                    08:43 Exam Location:     Inpatient Priority:          Routine Ordering Physician:        FABIAN RAMIRES Referring Physician: Sonographer:               Sherry Funes RDCS Age:    52     Gender:    M MRN:    [id number] :    1965 BSA:    2.51   Ht (in):    72     Wt (lb):    295 Exam Type:     Complete, Contrast Indications:     Paroxysmal atrial fibrillation ICD Codes:       I480 CPT Codes:       77284,  BP:   145    /   101    HR:   110 Technical Quality:       Technically difficult study -                          adequate information is obtained MEASUREMENTS  (Male / Female) Normal Values 2D ECHO LV Diastolic Diameter PLAX        4.4 cm                4.2 - 5.9 / 3.9 - 5.3 cm LV Systolic Diameter PLAX         2.5 cm                2.1 - 4.0 cm IVS Diastolic Thickness           1.5 cm                LVPW Diastolic Thickness          1.1 cm                RV Diameter 4C                    3.2 cm                2.5 - 2.9 cm LVOT Diameter                     2.1 cm                RA Diameter                       3.6 cm                Estimated LV Ejection Fraction    60 %                  LV Ejection Fraction MOD BP       54.7 %                >= 55  % LV Ejection Fraction MOD 4C       64.7 %                LV Ejection Fraction MOD 2C       55.5 %                LA Volume Index                   28.8 cm³/m²           16 - 28 cm³/m² IVC Diameter                      1.5 cm                M-MODE Aortic Root Diameter MM           3.9 cm                DOPPLER AV Peak Velocity                  1.3 m/s               AV Peak Gradient                  7 mmHg                 AV Mean Gradient                  3.5 mmHg              LVOT Peak Velocity                1.2 m/s               AV Area Cont Eq vti               3 cm²                 TR Peak Velocity                  239 cm/s              PV Peak Velocity                  0.91 m/s              PV Peak Gradient                  3.3 mmHg              RVOT Peak Velocity                0.69 m/s              * Indicates values subject to auto-interpretation LV EF:  60    % FINDINGS Left Ventricle 3 mL of contrast was administered. Contrast was used to enhance visualization of the endocardial border. Existing IV was used. Normal left ventricular chamber size. Mild concentric left ventricular hypertrophy. Normal left ventricular systolic function. Left ventricular ejection fraction is visually estimated to be 60%. Diastolic function is difficult to assess with atrial fibrillation. Right Ventricle Normal right ventricular size and systolic function. Right Atrium Normal right atrial size. Normal inferior vena cava size and inspiratory collapse. Left Atrium Normal left atrial size. Mitral Valve Thickened mitral valve leaflets. Trace mitral regurgitation. Aortic Valve Structurally normal aortic valve without significant stenosis or regurgitation. Tricuspid Valve Mild tricuspid regurgitation. Estimated right ventricular systolic pressure  is 35 mmHg. Pulmonic Valve Structurally normal pulmonic valve without significant stenosis or regurgitation. Pericardium Normal pericardium without effusion. Aorta Aortic root dilatation measuring 3.9 cm. Rocio Ross M.D. (Electronically Signed) Final Date:     27 January 2018                 14:23    Le Venous Duplex (specify In Comments Left, Right Or Bilateral)    Result Date: 2/2/2018   Vascular Laboratory  CONCLUSIONS  EMELY FLORES  Exam Date:     02/02/2018 04:36  Room #:     Inpatient  Priority:     Routine  Ht (in):             Wt (lb):  Ordering Physician:        CLAY  SHIRLEY MCCLOUD  Referring Physician:       [id number] Sultana  Sonographer:               YOKASTA troncoso rvt  Study Type:  Technical Quality:         Adequate  Age:    52    Gender:     M  MRN:    [id number]  :    1965      BSA:  Indications:     RT LEG SWELLING  CPT Codes:  ICD Codes:  History:         Swelling of the rt limb  Limitations:     body habitus in calf  PROCEDURES:  rt lower ext venous doppler  FINDINGS:  Complete color filling and compressibility with normal venous flow dynamics  including spontaneous flow, response to augmentation maneuvers, and  respiratory phasicity.  The peroneal and posterior tibial veins are difficult to assess for  compressibility, but flow response to augmentation is demonstrated.  GSV DST THIGH Densely echogenic material is within  the vein that is  consistent with chronic venous thrombosis. FLOW IS SEEN  ___vein is partially compressible.  AREA OF PAIN - edema obscures the visualization of the  lat/post calf     Nm-cardiac Stress Test    Addendum Date: 2018    Baseline ECG:atrial fibrillation Stress ECG: No ischemic T wave changes with peak stress Impression: Normal stress ECG, see nuclear images     Result Date: 2018   Myocardial Perfusion  Report  NUCLEAR IMAGING INTERPRETATION  No evidence of significant jeopardized viable myocardium or prior myocardial  infarction.  Normal left ventricular size, ejection fraction, and wall motion.  EMELY FLORES  MRN:    [id number]         Gender:    M  Exam Date: 2018 06:35  Exam Location:      Inpatient  Ordering Phys:     ALETHEA MASSEY  NucMed Tech:       Clarice Escoto, RT  Age:    52    :    1965        Ht (in):     72  Wt (lb):     298    BMI:    40.37       Radiologist  Risk Factors:             Family history of coronary disease, Diabetes,                            Hypertension  Indications:              Shortness of breath, Chest pain, unspecified  ICD Codes:                  R079   Cardiac History:          None reported  Cardiac Meds:  Meds Past 24 hrs:  Pretest Chest Pain:  STRESS TEST      Pharmacologi                   uche Agrawal   Lexiscan       Dose: 0.4 mg  ol:  Post-Injection Exercise:  Resting HR (bpm):      95  Peak HR (bpm):         109  Resting BP (mmHg):       122    /   77  Peak BP (mmHg):       131   /   92  MaxPHR:     166     Target HR (bpm):       141  % MaxPHR:     66  Double Product:       63936  BP Response:  Stress Termination:       Completed Protocol  Stress Symptoms:  Arrhythmia: None Chest Pain: None Dyspnea Flushing, Headache, Somach Cramps,  Leg Fatigue  ECG  Resting ECG:  Stress ECG:  IMAGE PROTOCOL      Rest/Stress 1                      Day          RadiopharmaceuticalDose (mCi)   Imaging  Date      Imaging  Time         Inj to Img Time (min)  Rest:   Tc-99m             7.4          29-Jan-2018        13:36          Tetrofosmin  Stress: Tc-99m             25.0         29-Jan-2018        14:27          Tetrofosmin  Rest:  Administration Site:       Left forearm  Administered by:      RT Rasheeda  Stress:  Administration Site:       Left forearm  Administered by:      Graeme Joaquin RN  % Percent HR Achieved:  SPECT RESULTS  Technical Quality:       Good  Raw Data Analysis:  Summed Stress Score:    2  Summed Rest Score:    2  Summed Difference Score:        2  PERFUSION:  Normal myocardial perfusion with no ischemia.  FUNCTIONAL RESULTS (calculated via Gated SPECT)  Stress Image LV EF:        64     %  Upper Normal Limit  Stress EDV:      92     ml   EDVI:    36      ml/m²  Stress ESV:      33     ml   ESVI:    13      ml/m²  TID:    0.96   TID - 1.19      TID (ed) - 1.23  LV Function:  Normal left ventricular wall motion.  LV ejection fraction = 64%.  Shmuel Rai  (Electronically Signed)  Final Date:      29 January 2018                   15:38    Ct-cta Chest Pulmonary Artery W/ Recons    Result Date: 2/2/2018 2/2/2018 4:04 AM HISTORY/REASON FOR  EXAM:  Pain TECHNIQUE/EXAM DESCRIPTION: CT angiogram scan for pulmonary embolism with contrast, with reconstructions. 1.25 mm helical sections were obtained from the lung apices through the lung bases following the rapid bolus administration of 90 mL of Omnipaque 350 nonionic contrast. Thin-section overlapping reconstruction interval was utilized. Coronal reconstructions were generated from the axial data. MIP post processing was performed and utilized for the interpretation. Low dose optimization technique was utilized for this CT exam including automated exposure control and adjustment of the mA and/or kV according to patient size. COMPARISON: None FINDINGS: Pulmonary Embolism: No. Lungs: No suspicious nodules or air space process. Pleura: No pleural effusion. Nodes: No enlarged lymph nodes. Additional findings: There are multiple focal gas lucencies within the gallbladder, consistent with known opaque gallstones. Localized increased opacity is seen in the retroareolar regions of both breasts, consistent with gynecomastia..     1.  No evidence of pulmonary emboli or other acute intrathoracic abnormality. 2.  Cholelithiasis. 3.  Gynecomastia.      Assessment/Plan     Anticipate that patient will needGreater than 2 midnights for management of the discussed medical issues.    This dictation was created using voice recognition software. The accuracy of the dictation is limited to the abilities of the software. I expect there may be some errors of grammar and possibly content.    * Diabetic foot infection (CMS-HCC)   Assessment & Plan    Resume Zyvox, patient has missed the last 4 days therefore I will start on IV and then to transition to oral once we see clinical improvement in his foot. Infectious disease had already made recommendations from his previous admission therefore we will follow these, I do not anticipate a need for a new consult. Dr. Perez of orthopedic surgery has been consulted and I look forward to  his recommendations. I have requested wound care on an inpatient basis. His previous cultures obtained intraoperatively grew enterococcus bacillus as well as MRSA. He currently is afebrile, with stable vital signs and no leukocytosis. Of note, I believe his missed doses are secondary to medical noncompliance as noted in the history above.        Type 2 diabetes mellitus (CMS-HCC)- (present on admission)   Assessment & Plan    Previous hemoglobin A1c show a history of poor control, I will repeat now. I'm holding his metformin for the time being and covering him with insulin sliding scale and will monitor with Accu-Cheks.        Atrial fibrillation (CMS-HCC)- (present on admission)   Assessment & Plan    This is chronic, currently stable with controlled rates. Continue home metoprolol, digoxin, and xarelto.        HLD (hyperlipidemia)- (present on admission)   Assessment & Plan    Patient denies any chest pain, continue home Pravachol.        Hypertension- (present on admission)   Assessment & Plan    This is chronic, blood pressure is currently well controlled on his home Prinivil which I will continue.          Prophylaxis: Patient is on xarelto, no additional need for DVT prophylaxis, no PPI indicated, stool softeners ordered

## 2018-02-02 NOTE — PROGRESS NOTES
Patient arrived to floor from Ed. Patient is alert and oriented. On Ra. Amission profile is done. Cont to monitor.

## 2018-02-03 LAB
ANION GAP SERPL CALC-SCNC: 6 MMOL/L (ref 0–11.9)
BUN SERPL-MCNC: 15 MG/DL (ref 8–22)
CALCIUM SERPL-MCNC: 8.1 MG/DL (ref 8.5–10.5)
CHLORIDE SERPL-SCNC: 105 MMOL/L (ref 96–112)
CO2 SERPL-SCNC: 24 MMOL/L (ref 20–33)
CREAT SERPL-MCNC: 0.82 MG/DL (ref 0.5–1.4)
ERYTHROCYTE [DISTWIDTH] IN BLOOD BY AUTOMATED COUNT: 52 FL (ref 35.9–50)
GLUCOSE BLD-MCNC: 117 MG/DL (ref 65–99)
GLUCOSE BLD-MCNC: 121 MG/DL (ref 65–99)
GLUCOSE BLD-MCNC: 130 MG/DL (ref 65–99)
GLUCOSE BLD-MCNC: 182 MG/DL (ref 65–99)
GLUCOSE SERPL-MCNC: 129 MG/DL (ref 65–99)
HCT VFR BLD AUTO: 32.3 % (ref 42–52)
HGB BLD-MCNC: 10.3 G/DL (ref 14–18)
MCH RBC QN AUTO: 27.2 PG (ref 27–33)
MCHC RBC AUTO-ENTMCNC: 31.9 G/DL (ref 33.7–35.3)
MCV RBC AUTO: 85.4 FL (ref 81.4–97.8)
PLATELET # BLD AUTO: 233 K/UL (ref 164–446)
PMV BLD AUTO: 10.5 FL (ref 9–12.9)
POTASSIUM SERPL-SCNC: 3.7 MMOL/L (ref 3.6–5.5)
RBC # BLD AUTO: 3.78 M/UL (ref 4.7–6.1)
SODIUM SERPL-SCNC: 135 MMOL/L (ref 135–145)
WBC # BLD AUTO: 9.4 K/UL (ref 4.8–10.8)

## 2018-02-03 PROCEDURE — 82962 GLUCOSE BLOOD TEST: CPT

## 2018-02-03 PROCEDURE — 36415 COLL VENOUS BLD VENIPUNCTURE: CPT

## 2018-02-03 PROCEDURE — 700111 HCHG RX REV CODE 636 W/ 250 OVERRIDE (IP): Performed by: HOSPITALIST

## 2018-02-03 PROCEDURE — 99232 SBSQ HOSP IP/OBS MODERATE 35: CPT | Performed by: INTERNAL MEDICINE

## 2018-02-03 PROCEDURE — 80048 BASIC METABOLIC PNL TOTAL CA: CPT

## 2018-02-03 PROCEDURE — 85027 COMPLETE CBC AUTOMATED: CPT

## 2018-02-03 PROCEDURE — 700102 HCHG RX REV CODE 250 W/ 637 OVERRIDE(OP): Performed by: HOSPITALIST

## 2018-02-03 PROCEDURE — 770004 HCHG ROOM/CARE - ONCOLOGY PRIVATE *

## 2018-02-03 PROCEDURE — A9270 NON-COVERED ITEM OR SERVICE: HCPCS | Performed by: HOSPITALIST

## 2018-02-03 RX ADMIN — DIGOXIN 250 MCG: 250 TABLET ORAL at 18:27

## 2018-02-03 RX ADMIN — ASPIRIN 81 MG: 81 TABLET, COATED ORAL at 10:08

## 2018-02-03 RX ADMIN — LINEZOLID 600 MG: 600 INJECTION, SOLUTION INTRAVENOUS at 19:32

## 2018-02-03 RX ADMIN — PRAVASTATIN SODIUM 20 MG: 20 TABLET ORAL at 19:32

## 2018-02-03 RX ADMIN — LISINOPRIL 40 MG: 20 TABLET ORAL at 10:08

## 2018-02-03 RX ADMIN — RIVAROXABAN 20 MG: 20 TABLET, FILM COATED ORAL at 18:29

## 2018-02-03 RX ADMIN — ACETAMINOPHEN 650 MG: 325 TABLET, FILM COATED ORAL at 10:26

## 2018-02-03 RX ADMIN — METOPROLOL TARTRATE 50 MG: 50 TABLET, FILM COATED ORAL at 19:32

## 2018-02-03 RX ADMIN — INSULIN HUMAN 2 UNITS: 100 INJECTION, SOLUTION PARENTERAL at 12:29

## 2018-02-03 RX ADMIN — METOPROLOL TARTRATE 50 MG: 50 TABLET, FILM COATED ORAL at 10:08

## 2018-02-03 RX ADMIN — STANDARDIZED SENNA CONCENTRATE AND DOCUSATE SODIUM 2 TABLET: 8.6; 5 TABLET, FILM COATED ORAL at 19:32

## 2018-02-03 RX ADMIN — LINEZOLID 600 MG: 600 INJECTION, SOLUTION INTRAVENOUS at 10:11

## 2018-02-03 ASSESSMENT — ENCOUNTER SYMPTOMS
CONSTIPATION: 0
MYALGIAS: 1
DEPRESSION: 0
CLAUDICATION: 0
SENSORY CHANGE: 0
WEIGHT LOSS: 0
CHILLS: 0
WEAKNESS: 0
FEVER: 0
COUGH: 0
VOMITING: 0
PHOTOPHOBIA: 0
DIZZINESS: 0
INSOMNIA: 0
DIARRHEA: 0
HEADACHES: 0
BLURRED VISION: 0
NAUSEA: 0
SHORTNESS OF BREATH: 0
HEARTBURN: 0
ABDOMINAL PAIN: 0
NERVOUS/ANXIOUS: 0
SPEECH CHANGE: 0

## 2018-02-03 ASSESSMENT — PATIENT HEALTH QUESTIONNAIRE - PHQ9
2. FEELING DOWN, DEPRESSED, IRRITABLE, OR HOPELESS: NOT AT ALL
SUM OF ALL RESPONSES TO PHQ QUESTIONS 1-9: 0
1. LITTLE INTEREST OR PLEASURE IN DOING THINGS: NOT AT ALL
SUM OF ALL RESPONSES TO PHQ9 QUESTIONS 1 AND 2: 0

## 2018-02-03 ASSESSMENT — PAIN SCALES - GENERAL
PAINLEVEL_OUTOF10: 5
PAINLEVEL_OUTOF10: 0
PAINLEVEL_OUTOF10: 2

## 2018-02-03 NOTE — PROGRESS NOTES
In bed awake, states he just can't sleep. Very pleasant and cooperative. Vss, bs-wnl. Denies pain or needs.

## 2018-02-03 NOTE — PROGRESS NOTES
Seen and examined.  Right foot incision painful only proximally in an area of blistering.  Concern for infection upon admission in ER.    Blood pressure 149/67, pulse 60, temperature 36.9 °C (98.5 °F), resp. rate 18, height 1.829 m (6'), weight (!) 137 kg (302 lb 0.5 oz), SpO2 98 %.      Exam:  R foot amputation site healed.  Proximally he has a small area of blistering, but otherwise his wound appears in excellent condition    #1 Right foot 1st ray amputation    Plan:  - WBAT RLE  - Would recommend Limb Preservation consult (placed)  - OK to remove sutures

## 2018-02-03 NOTE — CARE PLAN
Problem: Safety  Goal: Will remain free from falls    Intervention: Assess risk factors for falls   02/03/18 0304   OTHER   Fall Risk Risk to Fall - 0 - 1 point   Risk for Injury-Any positive answers results in the pt being at high risk for fall related injury Not Applicable   Mobility Status Assessment 0-Ambulates & Transfers Independently. No Assistance Required   History of fall 0   Pt Calls for Assistance Yes   Mobilizes independently using fww. Steady gait.        Problem: Knowledge Deficit  Goal: Knowledge of disease process/condition, treatment plan, diagnostic tests, and medications will improve  Outcome: PROGRESSING AS EXPECTED  Plan of care discussed and questions answered.

## 2018-02-03 NOTE — PROGRESS NOTES
"[facility]    Date of Service: 2/3/2018    Chief Complaint  52 y.o. male admitted 2018 with continued right foot infection and non-compliance with picking up his medication.    Interval Problem Update  Patient admitted with \"worsening foot pain\" though he first stated he didn't receive a prescription when he presented to ed 2 days ago for chest pain and then when given another paper prescription (though first one sent straight to the pharmacy and required a prior auth that was approved and medication has been waiting for him since first discharge) he managed to lose that one and never went to the pharmacy.  Given the missed doses he was admitted for IV antibiotics and ortho/wound care consultation.  Patient states he has friends living with him who can assist in care and around the home and he feels he could manage.  Patient when ready to dc will need to go straight to [facility] pharmacy and  his antibiotic and finish the course of treatment.    Consultants/Specialty  Ortho - Walker    Disposition  Home Monday.        Review of Systems   Constitutional: Negative for chills, fever and weight loss.   HENT: Negative for congestion.    Eyes: Negative for blurred vision and photophobia.   Respiratory: Negative for cough and shortness of breath.    Cardiovascular: Negative for chest pain, claudication and leg swelling.   Gastrointestinal: Negative for abdominal pain, constipation, diarrhea, heartburn, nausea and vomiting.   Genitourinary: Negative for dysuria and hematuria.   Musculoskeletal: Positive for myalgias (right foot pain). Negative for joint pain.   Skin: Negative for itching and rash.   Neurological: Negative for dizziness, sensory change, speech change, weakness and headaches.   Psychiatric/Behavioral: Negative for depression. The patient is not nervous/anxious and does not have insomnia.       Physical Exam  Laboratory/Imaging   Hemodynamics  Temp (24hrs), Av.8 °C (98.2 °F), " Min:36.4 °C (97.5 °F), Max:37.2 °C (98.9 °F)   Temperature: 36.9 °C (98.5 °F)  Pulse  Av.3  Min: 58  Max: 80    Blood Pressure: 149/67      Respiratory      Respiration: 18, Pulse Oximetry: 98 %     Work Of Breathing / Effort: Mild  RUL Breath Sounds: Clear, RML Breath Sounds: Clear, RLL Breath Sounds: Diminished, ELIAS Breath Sounds: Clear, LLL Breath Sounds: Diminished    Fluids  No intake or output data in the 24 hours ending 18 1450    Nutrition  Orders Placed This Encounter   Procedures   • Diet Order     Standing Status:   Standing     Number of Occurrences:   1     Order Specific Question:   Diet:     Answer:   Diabetic [3]     Physical Exam   Constitutional: He is oriented to person, place, and time. He appears well-developed and well-nourished. No distress.   HENT:   Head: Normocephalic and atraumatic.   Eyes: Conjunctivae are normal. No scleral icterus.   Neck: Neck supple. No JVD present.   Cardiovascular: Normal rate and normal heart sounds.  Exam reveals no gallop and no friction rub.    No murmur heard.  Pulmonary/Chest: Effort normal and breath sounds normal. No respiratory distress. He has no wheezes. He exhibits no tenderness.   Abdominal: Soft. Bowel sounds are normal. He exhibits no distension and no mass. There is no tenderness.   Musculoskeletal: He exhibits no edema or tenderness.   Right great toe amputation surgery site with sutures in place, healing and wound well approximated.  Small area discolored below incision site with pain     Neurological: He is alert and oriented to person, place, and time. No cranial nerve deficit.   Skin: Skin is warm and dry. He is not diaphoretic. No erythema. No pallor.   Psychiatric: He has a normal mood and affect. His behavior is normal.   Nursing note and vitals reviewed.      Recent Labs      18   0345  18   0020   WBC  10.1  9.4   RBC  4.06*  3.78*   HEMOGLOBIN  11.2*  10.3*   HEMATOCRIT  34.7*  32.3*   MCV  85.5  85.4   MCH  27.6   27.2   MCHC  32.3*  31.9*   RDW  50.7*  52.0*   PLATELETCT  255  233   MPV  10.2  10.5     Recent Labs      02/02/18   0345  02/03/18   0020   SODIUM  136  135   POTASSIUM  3.7  3.7   CHLORIDE  104  105   CO2  28  24   GLUCOSE  167*  129*   BUN  14  15   CREATININE  0.80  0.82   CALCIUM  8.7  8.1*         Recent Labs      02/02/18   0345   BNPBTYPENAT  56              Assessment/Plan     * Diabetic foot infection (CMS-HCC)   Assessment & Plan    Resume Zyvox IV now and change to PO just prior to discharge  Dr. Perez of orthopedic surgery has been consulted by ERP and I look forward to his recommendations.  wound care consult  PO Zyvox prior authorization obtained, prescription filled and waiting for patient at pharmacy         Type 2 diabetes mellitus (CMS-HCC)- (present on admission)   Assessment & Plan    Previous hemoglobin A1c show a history of poor control, current A1C 6.8  Diabetic diet, SSI          Atrial fibrillation (CMS-HCC)- (present on admission)   Assessment & Plan    This is chronic, currently stable with controlled rates.   Continue home metoprolol, digoxin, and xarelto.        HLD (hyperlipidemia)- (present on admission)   Assessment & Plan    Pravastatin          Hypertension- (present on admission)   Assessment & Plan    Lisinopril, lopressor              Reviewed items::  Labs reviewed, Medications reviewed and Radiology images reviewed  Zabala catheter::  No Zabala  DVT: xarelto.  Antibiotics:  Treating active infection/contamination beyond 24 hours perioperative coverage

## 2018-02-04 LAB
GLUCOSE BLD-MCNC: 113 MG/DL (ref 65–99)
GLUCOSE BLD-MCNC: 139 MG/DL (ref 65–99)
GLUCOSE BLD-MCNC: 192 MG/DL (ref 65–99)

## 2018-02-04 PROCEDURE — 700111 HCHG RX REV CODE 636 W/ 250 OVERRIDE (IP): Performed by: HOSPITALIST

## 2018-02-04 PROCEDURE — 99232 SBSQ HOSP IP/OBS MODERATE 35: CPT | Performed by: INTERNAL MEDICINE

## 2018-02-04 PROCEDURE — 82962 GLUCOSE BLOOD TEST: CPT | Mod: 91

## 2018-02-04 PROCEDURE — 700102 HCHG RX REV CODE 250 W/ 637 OVERRIDE(OP): Performed by: INTERNAL MEDICINE

## 2018-02-04 PROCEDURE — A9270 NON-COVERED ITEM OR SERVICE: HCPCS | Performed by: HOSPITALIST

## 2018-02-04 PROCEDURE — 700102 HCHG RX REV CODE 250 W/ 637 OVERRIDE(OP): Performed by: HOSPITALIST

## 2018-02-04 PROCEDURE — 770021 HCHG ROOM/CARE - ISO PRIVATE

## 2018-02-04 PROCEDURE — A9270 NON-COVERED ITEM OR SERVICE: HCPCS | Performed by: INTERNAL MEDICINE

## 2018-02-04 RX ORDER — LINEZOLID 600 MG/1
600 TABLET, FILM COATED ORAL EVERY 12 HOURS
Status: DISCONTINUED | OUTPATIENT
Start: 2018-02-04 | End: 2018-02-05 | Stop reason: HOSPADM

## 2018-02-04 RX ADMIN — LISINOPRIL 40 MG: 20 TABLET ORAL at 10:24

## 2018-02-04 RX ADMIN — METOPROLOL TARTRATE 50 MG: 50 TABLET, FILM COATED ORAL at 10:24

## 2018-02-04 RX ADMIN — METOPROLOL TARTRATE 50 MG: 50 TABLET, FILM COATED ORAL at 20:50

## 2018-02-04 RX ADMIN — LINEZOLID 600 MG: 600 TABLET, FILM COATED ORAL at 16:32

## 2018-02-04 RX ADMIN — DIGOXIN 250 MCG: 250 TABLET ORAL at 16:34

## 2018-02-04 RX ADMIN — ASPIRIN 81 MG: 81 TABLET, COATED ORAL at 10:25

## 2018-02-04 RX ADMIN — INSULIN HUMAN 2 UNITS: 100 INJECTION, SOLUTION PARENTERAL at 18:21

## 2018-02-04 RX ADMIN — LINEZOLID 600 MG: 600 TABLET, FILM COATED ORAL at 20:51

## 2018-02-04 RX ADMIN — RIVAROXABAN 20 MG: 20 TABLET, FILM COATED ORAL at 16:33

## 2018-02-04 RX ADMIN — PRAVASTATIN SODIUM 20 MG: 20 TABLET ORAL at 20:51

## 2018-02-04 RX ADMIN — LINEZOLID 600 MG: 600 INJECTION, SOLUTION INTRAVENOUS at 10:22

## 2018-02-04 ASSESSMENT — ENCOUNTER SYMPTOMS
SPEECH CHANGE: 0
COUGH: 0
ABDOMINAL PAIN: 0
DEPRESSION: 0
MYALGIAS: 1
NAUSEA: 0
DIARRHEA: 0
NERVOUS/ANXIOUS: 0
FEVER: 0
CLAUDICATION: 0
INSOMNIA: 0
DIZZINESS: 0
VOMITING: 0
CHILLS: 0
HEADACHES: 0
BLURRED VISION: 0
CONSTIPATION: 0
SENSORY CHANGE: 0
PHOTOPHOBIA: 0
WEIGHT LOSS: 0
SHORTNESS OF BREATH: 0
WEAKNESS: 0
HEARTBURN: 0

## 2018-02-04 ASSESSMENT — PAIN SCALES - GENERAL
PAINLEVEL_OUTOF10: 0

## 2018-02-04 ASSESSMENT — CHA2DS2 SCORE
DIABETES: YES
AGE 65 TO 74: NO
CHF OR LEFT VENTRICULAR DYSFUNCTION: NO
CHA2DS2 VASC SCORE: 2
VASCULAR DISEASE: NO
SEX: MALE
HYPERTENSION: YES
AGE 75 OR GREATER: NO
PRIOR STROKE OR TIA OR THROMBOEMBOLISM: NO

## 2018-02-04 NOTE — PROGRESS NOTES
Oncology Shift Summary: [id number]  Orientation- Alert and oriented x4  Incision- Right foot incision- wound care saw patient today and removed sutures.  IV ABX started  Lines/Drains- PIV  Telemetry- NA  Pain- APAP  Diet- Diabetic  Discharge Plan- Home when ready    Concerns-  No acute concerns.

## 2018-02-04 NOTE — DIETARY
NUTRITION SERVICES: BMI - Pt with BMI >40 (=40.95). Weight loss counseling not appropriate in acute care setting. RECOMMEND - Referral to outpatient nutrition services for weight management after D/C.

## 2018-02-04 NOTE — CARE PLAN
Problem: Infection  Goal: Will remain free from infection    Intervention: Assess signs and symptoms of infection  Antibiotics given as ordered. Incision well approximated, no redness, or drainage noted.      Problem: Knowledge Deficit  Goal: Knowledge of disease process/condition, treatment plan, diagnostic tests, and medications will improve  Outcome: PROGRESSING AS EXPECTED  Plan of care discussed and questions answered.

## 2018-02-04 NOTE — PROGRESS NOTES
"[facility] Hospitalist Progress Note    Date of Service: 2/4/2018    Chief Complaint  52 y.o. male admitted 2/2/2018 with continued right foot infection and non-compliance with picking up his medication.    Interval Problem Update  2/3 Patient admitted with \"worsening foot pain\" though he first stated he didn't receive a prescription when he presented to ed 2 days ago for chest pain and then when given another paper prescription (though first one sent straight to the pharmacy and required a prior auth that was approved and medication has been waiting for him since first discharge) he managed to lose that one and never went to the pharmacy.  Given the missed doses he was admitted for IV antibiotics and ortho/wound care consultation.  Patient states he has friends living with him who can assist in care and around the home and he feels he could manage.  Patient when ready to dc will need to go straight to [facility] pharmacy and  his antibiotic and finish the course of treatment.  2/4 Patient feeling well, no acute complaints, he is ambulating with FWW and offloading boots.  He had 2 IV's blow so far today so I will resume PO zyvox in anticipation of dc home tomorrow.  Wound care has seen patient and recommendations made.    Consultants/Specialty  Ortho - Walker    Disposition  Home Monday.        Review of Systems   Constitutional: Negative for chills, fever and weight loss.   HENT: Negative for congestion.    Eyes: Negative for blurred vision and photophobia.   Respiratory: Negative for cough and shortness of breath.    Cardiovascular: Negative for chest pain, claudication and leg swelling.   Gastrointestinal: Negative for abdominal pain, constipation, diarrhea, heartburn, nausea and vomiting.   Genitourinary: Negative for dysuria and hematuria.   Musculoskeletal: Positive for myalgias (right foot pain). Negative for joint pain.   Skin: Negative for itching and rash.   Neurological: Negative for dizziness, sensory " change, speech change, weakness and headaches.   Psychiatric/Behavioral: Negative for depression. The patient is not nervous/anxious and does not have insomnia.       Physical Exam  Laboratory/Imaging   Hemodynamics  Temp (24hrs), Av.6 °C (97.8 °F), Min:36.4 °C (97.6 °F), Max:36.6 °C (97.9 °F)   Temperature: 36.6 °C (97.8 °F)  Pulse  Av.4  Min: 53  Max: 80    Blood Pressure: 152/80      Respiratory      Respiration: 16, Pulse Oximetry: 99 %     Work Of Breathing / Effort: Mild       Fluids  No intake or output data in the 24 hours ending 18 1504    Nutrition  Orders Placed This Encounter   Procedures   • Diet Order     Standing Status:   Standing     Number of Occurrences:   1     Order Specific Question:   Diet:     Answer:   Diabetic [3]     Physical Exam   Constitutional: He is oriented to person, place, and time. He appears well-developed and well-nourished. No distress.   HENT:   Head: Normocephalic and atraumatic.   Eyes: Conjunctivae are normal. No scleral icterus.   Neck: Neck supple. No JVD present.   Cardiovascular: Normal rate and normal heart sounds.  Exam reveals no gallop and no friction rub.    No murmur heard.  Pulmonary/Chest: Effort normal and breath sounds normal. No respiratory distress. He has no wheezes. He exhibits no tenderness.   Abdominal: Soft. Bowel sounds are normal. He exhibits no distension and no mass. There is no tenderness.   Musculoskeletal: He exhibits no edema or tenderness.   Right great toe amputation surgery site - bandage in place from wound care.   Neurological: He is alert and oriented to person, place, and time. No cranial nerve deficit.   Skin: Skin is warm and dry. He is not diaphoretic. No erythema. No pallor.   Psychiatric: He has a normal mood and affect. His behavior is normal.   Nursing note and vitals reviewed.      Recent Labs      18   0345  18   0020   WBC  10.1  9.4   RBC  4.06*  3.78*   HEMOGLOBIN  11.2*  10.3*   HEMATOCRIT  34.7*   32.3*   MCV  85.5  85.4   MCH  27.6  27.2   MCHC  32.3*  31.9*   RDW  50.7*  52.0*   PLATELETCT  255  233   MPV  10.2  10.5     Recent Labs      02/02/18   0345  02/03/18   0020   SODIUM  136  135   POTASSIUM  3.7  3.7   CHLORIDE  104  105   CO2  28  24   GLUCOSE  167*  129*   BUN  14  15   CREATININE  0.80  0.82   CALCIUM  8.7  8.1*         Recent Labs      02/02/18   0345   BNPBTYPENAT  56              Assessment/Plan     * Diabetic foot infection (CMS-HCC)   Assessment & Plan    Zyvox change to PO- has blown 2 IV's today  Dr. Delong of orthopedic surgery has been consulted - LPS care consult  PO Zyvox prior authorization obtained, prescription filled and waiting for patient at pharmacy [address] [place] [address].        Type 2 diabetes mellitus (CMS-HCC)- (present on admission)   Assessment & Plan    Previous hemoglobin A1c show a history of poor control, current A1C 6.8  Diabetic diet, SSI          Atrial fibrillation (CMS-HCC)- (present on admission)   Assessment & Plan    This is chronic, currently stable with controlled rates.   Continue home metoprolol, digoxin, and xarelto.        HLD (hyperlipidemia)- (present on admission)   Assessment & Plan    Pravastatin          Hypertension- (present on admission)   Assessment & Plan    Lisinopril, lopressor              Reviewed items::  Labs reviewed, Medications reviewed and Radiology images reviewed  Zabala catheter::  No Zabala  DVT: xarelto.  Antibiotics:  Treating active infection/contamination beyond 24 hours perioperative coverage

## 2018-02-04 NOTE — PROGRESS NOTES
[facility]  Inpatient Services  Initial Wound & Skin Care Evaluation    LIMB PRESERVATION SERVICE NOTE:    Wound(s): Right foot first ray amputation 1/17/18 with Dr Delong.   Allergies: Pcn [penicillins] and Daptomycin  Start of Care: 2/3/2018  Room/Bed  R319/00      Subjective:      History of Present Illness:   Past Medical History:   Diagnosis Date   • A-fib (CMS-HCC)    • Chest pain    • Diabetes (CMS-HCC)    • Diabetic neuropathy (CMS-HCC)    • Hypercholesterolemia    • Hypertension    • Morbid obesity (CMS-HCC)    • Noncompliance    • Normal cardiac stress test        Patient is a 52 y.o. male. Admitted for Diabetic foot infection (CMS-HCC),     Patient is well known to LPS and outpatient wound care services.   Pt was discharged from the hospital on January 29. After he was discharged from the emergency room, he states he began to have right foot swelling, erythema, and pain. He noticed some drainage from the surgical wound when he changed his dressings.  Patient presented with S/p R great toe amputation by Dr. Leo on 11/10/17 for infection causing sepsis. Right foot first ray amputation 1/17/18 with Dr Delong.                Patient denies fevers chills, nausea, vomiting.     Pain:        Patient resting comfortably        Objective:        PHYSICAL EXAMINATION:        PHYSICAL EXAMINATION:      General Appearance:  Well developed,  nourished, in no acute distress     Sensory Assessment        Patient sensation insensate bilaterally with light touch 10 of 10 points        Vascular Assessment        +2 DP, +2 PT bilaterally     Orthotic, protective, supportive devices:      Offloading  Patient with [place] boot at bedside HWB OOB    Vitals    /67   Pulse 60   Temp 36.9 °C (98.5 °F)   Resp 18   Ht 1.829 m (6')   Wt (!) 137 kg (302 lb 0.5 oz)   SpO2 98%   BMI 40.96 kg/m²      Wound Characteristics                                                    Location:Right Foot 1st Ray Amp  Initial Evaluation  Date:2/3/2018   Tissue Type and %: 100% Purple   Periwound: Pink   Drainage: Scant Serous   Exposed structures None   Wound Edges:   Attached   Odor: None   S&S of Infection:   Edema/Erythema   Edema: Localized at Site   Sensation: Insensate               Measurements: Initial Evaluation  Date:2/3/2018   Length (cm) 2   Width (cm) 2   Depth (cm)    Tract/undermine            Tests and Measures:    Labs  Recent Labs      02/02/18   0345  02/03/18   0020   WBC  10.1  9.4   RBC  4.06*  3.78*   HEMOGLOBIN  11.2*  10.3*   HEMATOCRIT  34.7*  32.3*   MCV  85.5  85.4   MCH  27.6  27.2   MCHC  32.3*  31.9*   RDW  50.7*  52.0*   PLATELETCT  255  233   MPV  10.2  10.5     Recent Labs      02/02/18   0345 02/03/18   0020   SODIUM  136  135   POTASSIUM  3.7  3.7   CHLORIDE  104  105   CO2  28  24   GLUCOSE  167*  129*   BUN  14  15       A1C 6.8% uncontrolled diabetes ordered  ESR: NA  CRP: NA    SHAYAN    NA    Imaging    NA    Infection Management    NA     Procedures:     Debridement : NA    Cleansed with: NS                                                                          Periwound protected with: Skin Prep   Primary dressing: AQAG   Secondary Dressing: Foam   Other: Tape     Patient Education:Implications of loss of protective sensation (LOPS) discussed with patient- including increased risk for amputation.  Advised to check foot at least daily, moisturize foot, and to always wear protective foot wear.       Professional Collaboration:  Bedside RN, Diabetes, Nutrition      Assessment:      Wound etiology: Diabetic Foot Wound     Wound Progress:  Initial Assessment     Rationale for Treatment: Aquacel Ag (Silver):  AgAq to manage bioburden, absorb exudate, and maintain moist wound environment without laterally wicking exudate therefore reducing vj-wound maceration     Patient tolerance/compliance: Open to Education      Complicating factors: DM, Obesity     Need for ongoing  Wound Care: Nursing to  do wound care, LPS will follow    Goals: Decreased wound size 2% each week -- 100% granulation in 2 weeks      Plan:      Treatment Plan and Recommendations:    Procedures:   Suture Removal:  Normal Saline was used to cleanse the wound and periwound. Forceps and Scissors were then use to remove the sutures. The patient tolerated the procedure well with little to no discomfort or pain with only scant bleeding from the wound bed.  The area was once again cleansed with normal saline revealing 100% red/pink tissue.     Wound Care:  Nursing to cleanse wound/periwound with Normal Saline.  Pat periwound dry and apply skin prep/No Sting to periwound.  Let air dry for 1-2 minutes.  Apply a piece of AqAg, cut to size, to the wound bed.  Cover with non adhesive foam, cut to size, and secure with hypafix tape.  Notify wound team if wound deteriorates or fails to progress.    Frequency:   NURSING TO CHANGE RIGHT 1st RAY AMP DRESSING EVERY 72 HOURS AND PRN FOR SATURATION OR DISLODGEMENT      AB per IM  DRESSING ORDERS UPDATED  WOUND CARE BY NURSING, LPS TO FOLLOW  DIABETES EDUCATION AND NUTRITION ORDERED  OFFLOADING BOOT AT BEDSIDE        RSKIN: CURRENT (X) ORDERED (O)  Q shift Michael:  X  Q shift pressure point assessments:  X  Pressure redistribution mattress        DARYL      Bariatric DARYL      Bariatric foam        Heel float boots       Heels floated on pillows      Barrier wipes      Barrier Cream      Barrier paste      Sacral silicone dressing      Padded O2 tubing      Anchorfast      Trach with Optifoam split foam       Waffle cushion      Rectal tube or BMS      Antifungal tx    Turn q 2 hours  Up to chair  Ambulate   PT/OT     Dietician      PO     TF   TPN     PVN    NPO   # days   Other       WOUND TEAM PLAN OF CARE (X):   NPWT change 3 x week:        Dressing changes by wound team:       Follow up as needed:  X     Other (explain):    Anticipated discharge plans (X):  SNF:           Home Care:           Outpatient  Wound Center:            Self Care:            Other:           TBD: X

## 2018-02-05 VITALS
HEIGHT: 72 IN | BODY MASS INDEX: 40.91 KG/M2 | HEART RATE: 62 BPM | SYSTOLIC BLOOD PRESSURE: 128 MMHG | WEIGHT: 302.03 LBS | OXYGEN SATURATION: 98 % | RESPIRATION RATE: 18 BRPM | TEMPERATURE: 97.9 F | DIASTOLIC BLOOD PRESSURE: 72 MMHG

## 2018-02-05 LAB — GLUCOSE BLD-MCNC: 159 MG/DL (ref 65–99)

## 2018-02-05 PROCEDURE — A9270 NON-COVERED ITEM OR SERVICE: HCPCS | Performed by: HOSPITALIST

## 2018-02-05 PROCEDURE — 99239 HOSP IP/OBS DSCHRG MGMT >30: CPT | Performed by: INTERNAL MEDICINE

## 2018-02-05 PROCEDURE — 700102 HCHG RX REV CODE 250 W/ 637 OVERRIDE(OP): Performed by: INTERNAL MEDICINE

## 2018-02-05 PROCEDURE — A9270 NON-COVERED ITEM OR SERVICE: HCPCS | Performed by: INTERNAL MEDICINE

## 2018-02-05 PROCEDURE — 700102 HCHG RX REV CODE 250 W/ 637 OVERRIDE(OP): Performed by: HOSPITALIST

## 2018-02-05 PROCEDURE — 82962 GLUCOSE BLOOD TEST: CPT | Mod: 91

## 2018-02-05 PROCEDURE — 11721 DEBRIDE NAIL 6 OR MORE: CPT

## 2018-02-05 RX ORDER — LINEZOLID 600 MG/1
600 TABLET, FILM COATED ORAL EVERY 12 HOURS
Qty: 20 TAB | Refills: 0 | Status: ON HOLD
Start: 2018-02-05 | End: 2018-02-09

## 2018-02-05 RX ADMIN — INSULIN HUMAN 2 UNITS: 100 INJECTION, SOLUTION PARENTERAL at 00:39

## 2018-02-05 RX ADMIN — METOPROLOL TARTRATE 50 MG: 50 TABLET, FILM COATED ORAL at 09:58

## 2018-02-05 RX ADMIN — LINEZOLID 600 MG: 600 TABLET, FILM COATED ORAL at 09:57

## 2018-02-05 RX ADMIN — INSULIN HUMAN 2 UNITS: 100 INJECTION, SOLUTION PARENTERAL at 13:13

## 2018-02-05 RX ADMIN — ASPIRIN 81 MG: 81 TABLET, COATED ORAL at 09:57

## 2018-02-05 RX ADMIN — LISINOPRIL 40 MG: 20 TABLET ORAL at 09:57

## 2018-02-05 ASSESSMENT — PAIN SCALES - GENERAL: PAINLEVEL_OUTOF10: 0

## 2018-02-05 ASSESSMENT — LIFESTYLE VARIABLES: DO YOU DRINK ALCOHOL: NO

## 2018-02-05 NOTE — DISCHARGE PLANNING
Received choice form from LUNA Frost for HH. Referral sent to [facility].

## 2018-02-05 NOTE — DISCHARGE INSTRUCTIONS
Discharge Instructions    Discharged to home by car with escort. Discharged via wheelchair, hospital escort: Yes.  Special equipment needed: Walker    Be sure to schedule a follow-up appointment with your primary care doctor or any specialists as instructed.     Discharge Plan:   Influenza Vaccine Indication: Not indicated: Previously immunized this influenza season and > 8 years of age    I understand that a diet low in cholesterol, fat, and sodium is recommended for good health. Unless I have been given specific instructions below for another diet, I accept this instruction as my diet prescription.   Other diet: Diabetic    Special Instructions: None    · Is patient discharged on Warfarin / Coumadin?   No     Linezolid tablets  What is this medicine?  LINEZOLID (li NE zoh lid) is an oxazolidinone antibiotic. It is used to treat certain kinds of bacterial infections. It will not work for colds, flu, or other viral infections.  This medicine may be used for other purposes; ask your health care provider or pharmacist if you have questions.  COMMON BRAND NAME(S): Zyvox  What should I tell my health care provider before I take this medicine?  They need to know if you have any of these conditions:  -  cancer  -diabetes  -  heart disease  -  high blood pressure  -  kidney disease  -  pheochromocytoma  -  untreated thyroid disease  -  an unusual or allergic reaction to linezolid, other antibiotics or medicines, foods, dyes, or preservatives  -  pregnant or trying to get pregnant  -  breast-feeding  How should I use this medicine?  Take this medicine by mouth with a glass of water. Follow the directions on the prescription label. Take with food or on an empty stomach. Take your medicine at regular intervals. Do not take your medicine more often than directed. Take all of your medicine as directed even if you think your are better. Do not skip doses or stop your medicine early.  Talk to your pediatrician regarding the use  of this medicine in children. While this drug may be prescribed for selected conditions, precautions do apply.  Overdosage: If you think you have taken too much of this medicine contact a poison control center or emergency room at once.  NOTE: This medicine is only for you. Do not share this medicine with others.  What if I miss a dose?  If you miss a dose, take it as soon as you can. If it is almost time for your next dose, take only that dose. Do not take double or extra doses.  What may interact with this medicine?  Do not take this medicine with any of the following medications:  -bupropion  -certain medicines for depression, anxiety, or psychotic disturbances  -doxepin  -fluoxetine  -furazolidone  -green tea  -MAOIs like Carbex, Eldepryl, Marplan, Nardil, and Parnate  -milnacipran  -procarbazine  -rasagiline  -selegiline  -Zechariah's wort  -tryptophan   This medicine may also interact with the following medications:  -medicines for allergies or colds like phenylpropanolamine, pseudoephedrine  -medicines for blood pressure  -stimulant medicines for attention disorders, weight loss, or to stay awake  This list may not describe all possible interactions. Give your health care provider a list of all the medicines, herbs, non-prescription drugs, or dietary supplements you use. Also tell them if you smoke, drink alcohol, or use illegal drugs. Some items may interact with your medicine.  What should I watch for while using this medicine?  Tell your doctor or health care professional if your symptoms do not begin to improve or if you get new symptoms.  You will need to be on a special diet while taking this medicine. Ask your doctor or health care professional for a list of foods that you should try to avoid. This includes, but is not limited to, smoked or processed meats, aged cheeses, soy sauce, red conor and beer.  Do not treat diarrhea with over-the-counter products. Contact your doctor if you have diarrhea that  lasts more than 2 days or if the diarrhea is severe and watery.  What side effects may I notice from receiving this medicine?  Side effects that you should report to your doctor or health care professional as soon as possible:  -allergic reactions like skin rash, itching or hives, swelling of the face, lips, or tongue  -breathing problems  -burning, numbness, or tingling  -changes in vision  -confused, restless  -discolored, sore mouth  -fever  -irregular heart beat, blood pressure  -seizures  -tremor, trouble walking  -unusual bleeding or bruising  -unusually weak or tired  Side effects that usually do not require medical attention (report to your doctor or health care professional if they continue or are bothersome):  -changes in taste  -constipation or diarrhea  -dizzy  -headache  -nausea, vomiting  -stomach upset  -trouble sleeping  -vaginal itch, irritation  This list may not describe all possible side effects. Call your doctor for medical advice about side effects. You may report side effects to FDA at 8-750-FDA-5991.  Where should I keep my medicine?  Keep out of the reach of children.  Store at room temperature between 15 and 30 degrees C (59 and 86 degrees F). Keep container tightly closed to protect from light and moisture. Throw away any unused medicine after the expiration date.  NOTE: This sheet is a summary. It may not cover all possible information. If you have questions about this medicine, talk to your doctor, pharmacist, or health care provider.  © 2014, [employer]. (2/21/2012 12:11:19 PM)    Diabetes and Foot Care  Diabetes may cause you to have problems because of poor blood supply (circulation) to your feet and legs. This may cause the skin on your feet to become thinner, break easier, and heal more slowly. Your skin may become dry, and the skin may peel and crack. You may also have nerve damage in your legs and feet causing decreased feeling in them. You may not notice minor  injuries to your feet that could lead to infections or more serious problems. Taking care of your feet is one of the most important things you can do for yourself.   HOME CARE INSTRUCTIONS  · Wear shoes at all times, even in the house. Do not go barefoot. Bare feet are easily injured.  · Check your feet daily for blisters, cuts, and redness. If you cannot see the bottom of your feet, use a mirror or ask someone for help.  · Wash your feet with warm water (do not use hot water) and mild soap. Then pat your feet and the areas between your toes until they are completely dry. Do not soak your feet as this can dry your skin.  · Apply a moisturizing lotion or petroleum jelly (that does not contain alcohol and is unscented) to the skin on your feet and to dry, brittle toenails. Do not apply lotion between your toes.  · Trim your toenails straight across. Do not dig under them or around the cuticle. File the edges of your nails with an emery board or nail file.  · Do not cut corns or calluses or try to remove them with medicine.  · Wear clean socks or stockings every day. Make sure they are not too tight. Do not wear knee-high stockings since they may decrease blood flow to your legs.  · Wear shoes that fit properly and have enough cushioning. To break in new shoes, wear them for just a few hours a day. This prevents you from injuring your feet. Always look in your shoes before you put them on to be sure there are no objects inside.  · Do not cross your legs. This may decrease the blood flow to your feet.  · If you find a minor scrape, cut, or break in the skin on your feet, keep it and the skin around it clean and dry. These areas may be cleansed with mild soap and water. Do not cleanse the area with peroxide, alcohol, or iodine.  · When you remove an adhesive bandage, be sure not to damage the skin around it.  · If you have a wound, look at it several times a day to make sure it is healing.  · Do not use heating pads or  "hot water bottles. They may burn your skin. If you have lost feeling in your feet or legs, you may not know it is happening until it is too late.  · Make sure your health care provider performs a complete foot exam at least annually or more often if you have foot problems. Report any cuts, sores, or bruises to your health care provider immediately.  SEEK MEDICAL CARE IF:   · You have an injury that is not healing.  · You have cuts or breaks in the skin.  · You have an ingrown nail.  · You notice redness on your legs or feet.  · You feel burning or tingling in your legs or feet.  · You have pain or cramps in your legs and feet.  · Your legs or feet are numb.  · Your feet always feel cold.  SEEK IMMEDIATE MEDICAL CARE IF:   · There is increasing redness, swelling, or pain in or around a wound.  · There is a red line that goes up your leg.  · Pus is coming from a wound.  · You develop a fever or as directed by your health care provider.  · You notice a bad smell coming from an ulcer or wound.     This information is not intended to replace advice given to you by your health care provider. Make sure you discuss any questions you have with your health care provider.     Document Released: 12/15/2001 Document Revised: 08/20/2014 Document Reviewed: 05/27/2014  [employer] Interactive Patient Education ©2016 [employer]    MRSA FAQs  What is MRSA?  Staphylococcus aureus (pronounced jlsdk-mdh-jh-JOYCELYN-us AW-ree-us), or \"Staph\" is a very common germ that about 1 out of every 3 people have on their skin or in their nose. This germ does not cause any problems for most people who have it on their skin. But sometimes it can cause serious infections such as skin or wound infections, pneumonia, or infections of the blood.   Antibiotics are given to kill Staph germs when they cause infections. Some Staph are resistant, meaning they cannot be killed by some antibiotics. \"Methicillin-resistant Staphylococcus aureus\" or \"MRSA\" is a type " "of Staph that is resistant to some of the antibiotics that are often used to treat Staph infections.  Who is most likely to get an MRSA infection?  In the hospital, people who are more likely to get an MRSA infection are people who:  · have other health conditions making them sick  · have been in the hospital or a nursing home  · have been treated with antibiotics.  People who are healthy and who have not been in the hospital or a nursing home can also get MRSA infections. These infections usually involve the skin. More information about this type of MRSA infection, known as \"community-associated MRSA\" infection, is available from the Centers for Disease Control and Prevention (CDC). http://www.cdc.gov/mrsa  How do I get an MRSA infection?  People who have MRSA germs on their skin or who are infected with MRSA may be able to spread the germ to other people. MRSA can be passed on to bed linens, bed rails, bathroom fixtures, and medical equipment. It can spread to other people on contaminated equipment and on the hands of doctors, nurses, other healthcare providers and visitors.  Can MRSA infections be treated?  Yes, there are antibiotics that can kill MRSA germs. Some patients with MRSA abscesses may need surgery to drain the infection. Your healthcare provider will determine which treatments are best for you.  What are some of the things that hospitals are doing to prevent MRSA infections?  To prevent MRSA infections, doctors, nurses and other healthcare providers:  · Clean their hands with soap and water or an alcohol-based hand rub before and after caring for every patient.  · Carefully clean hospital rooms and medical equipment.  · Use Contact Precautions when caring for patients with MRSA. Contact Precautions mean:  ¨ Whenever possible, patients with MRSA will have a single room or will share a room only with someone else who also has MRSA.  ¨ Healthcare providers will put on gloves and wear a gown over their " clothing while taking care of patients with MRSA.  ¨ Visitors may also be asked to wear a gown and gloves.  ¨ When leaving the room, hospital providers and visitors remove their gown and gloves and clean their hands.  ¨ Patients on Contact Precautions are asked to stay in their hospital rooms as much as possible. They should not go to common areas, such as the gift shop or cafeteria. They may go to other areas of the hospital for treatments and tests.  · May test some patients to see if they have MRSA on their skin. This test involves rubbing a cotton-tipped swab in the patient's nostrils or on the skin.  What can I do to help prevent MRSA infections?  In the hospital  · Make sure that all doctors, nurses, and other healthcare providers clean their hands with soap and water or an alcohol-based hand rub before and after caring for you.  If you do not see your providers clean their hands, please ask them to do so.  When you go home  · If you have wounds or an intravascular device (such as a catheter or dialysis port) make sure that you know how to take care of them.  Can my friends and family get MRSA when they visit me?  The chance of getting MRSA while visiting a person who has MRSA is very low. To decrease the chance of getting MRSA your family and friends should:  · Clean their hands before they enter your room and when they leave.  · Ask a healthcare provider if they need to wear protective gowns and gloves when they visit you.  What do I need to do when I go home from the hospital?  To prevent another MRSA infection and to prevent spreading MRSA to others:  · Keep taking any antibiotics prescribed by your doctor. Don't take half-doses or stop before you complete your prescribed course.  · Clean your hands often, especially before and after changing your wound dressing or bandage.  · People who live with you should clean their hands often as well.  · Keep any wounds clean and change bandages as instructed until  healed.  · Avoid sharing personal items such as towels or razors.  · Wash and dry your clothes and bed linens in the warmest temperatures recommended on the labels.  · Tell your healthcare providers that you have MRSA. This includes home health nurses and aides, therapists, and personnel in doctors' offices.  · Your doctor may have more instructions for you.  If you have questions, please ask your doctor or nurse.  Developed and co-sponsored by The Society for Healthcare Epidemiology of [place] (SHEA); Infectious Diseases Society of [place] (IDSA); American Hospital Association; Association for Professionals in Infection Control and Epidemiology (APIC); Centers for Disease Control and Prevention (CDC); and The Joint Commission.     This information is not intended to replace advice given to you by your health care provider. Make sure you discuss any questions you have with your health care provider.     Document Released: 12/23/2014 Document Reviewed: 03/02/2016  [employer] Interactive Patient Education ©2016 [employer]    Depression / Suicide Risk    As you are discharged from this [facility] facility, it is important to learn how to keep safe from harming yourself.    Recognize the warning signs:  · Abrupt changes in personality, positive or negative- including increase in energy   · Giving away possessions  · Change in eating patterns- significant weight changes-  positive or negative  · Change in sleeping patterns- unable to sleep or sleeping all the time   · Unwillingness or inability to communicate  · Depression  · Unusual sadness, discouragement and loneliness  · Talk of wanting to die  · Neglect of personal appearance   · Rebelliousness- reckless behavior  · Withdrawal from people/activities they love  · Confusion- inability to concentrate     If you or a loved one observes any of these behaviors or has concerns about self-harm, here's what you can do:  · Talk about it- your feelings and reasons for  harming yourself  · Remove any means that you might use to hurt yourself (examples: pills, rope, extension cords, firearm)  · Get professional help from the community (Mental Health, Substance Abuse, psychological counseling)  · Do not be alone:Call your Safe Contact- someone whom you trust who will be there for you.  · Call your local CRISIS HOTLINE 264-5261 or 871-505-1858  · Call your local Children's Mobile Crisis Response Team [place] (710) 479-4850 or www.PlayCrafter  · Call the toll free National Suicide Prevention Hotlines   · National Suicide Prevention Lifeline 743-612-QQAC (1195)  · National Hope Line Network 800-SUICIDE (951-7888)

## 2018-02-05 NOTE — PROGRESS NOTES
"LIMB PRESERVATION SERVICE       51 y/o male with DM, obesity, HTN, and peripheral neuropathy. Admitted for diabetic foot infection after pt was unable to fill zyvox.  S/p R 1st ray amputation on 1/17/18 by Dr. Delong.  Sutures removed 2/3/18        /72   Pulse 62   Temp 36.6 °C (97.9 °F)   Resp 18   Ht 1.829 m (6' 0.01\")   Wt (!) 137 kg (302 lb 0.5 oz)   SpO2 98%   BMI 40.95 kg/m²     Pain controlled     DM  Seen by DME during recent past admission     Abx:  On zyvox per ID rec    Wearing bilateral offloading shoes. Discharging home today. Has leander boot at home. States he wears boot when at home.      R 1st ray amputation:   Incision approximated. Dry eschar to proximal aspect 2x2cm.       Incision cleaned with NS, applied adhesive foam.        DTI to lateral foot x2, POA.   Proximal DTI beginning to open, purple  Distal DTI intact, purple  Painted with betadine, covered with non adhesive foam and secured with hypafix tape      L great toe plantar partial thickness ulcer complicated by neuropathy:  Resolved, light callus   applied adhesive foam to protect area.      Pt seen for foot/nail care.  Toenails noted to be overgrown and mycotic.  Toes wrapped with soapy, wet washcloths and covered for 10 minutes prior to trimming and filing of nails.  Trimming and filing completed without difficulty, no knicks or cuts.             PLAN  R 1st ray-adhesive foam, R lateral foot-betadine, non adhesive foam and hypafix tape, L great toe-adhesive foam.   zyvox per ID rec  RLE HWB in offloading boot. Offloading shoe for L foot WBAT  Rx for orthotic shoes given to pt. Pt states he can fill Rx tomorrow        D/C plan: discharge today, resume HH. LPS rounds appt canceled.         "

## 2018-02-05 NOTE — PROGRESS NOTES
Pt rested well during the night with no complaints of pain. Pt up ambulating with boots. Pt remains on contact precautions for MRSA. Hourly rounding in place. Bed lowered, locked and call light within reach

## 2018-02-05 NOTE — CARE PLAN
Problem: Knowledge Deficit  Goal: Knowledge of disease process/condition, treatment plan, diagnostic tests, and medications will improve    Intervention: Assess knowledge level of disease process/condition, treatment plan, diagnostic tests, and medications  Pt able to verbalize understanding of plan of care.

## 2018-02-05 NOTE — FACE TO FACE
Face to Face Supporting Documentation - Home Health    The encounter with this patient was in whole or in part the primary reason for home health admission.    Date of encounter:   Patient:                    MRN:                       YOB: 2020  Sebastián Castro  [id number]  1965     Home health to see patient for:  Skilled Nursing care for assessment, interventions & education and Wound Care    Skilled need for:  Exacerbation of Chronic Disease State diabetic foot wound, post operative from great toe removal, MRSA    Skilled nursing interventions to include:  Wound Care    Homebound status evidenced by:  Needs the assistance of another person in order to leave the home. Leaving home requires a considerable and taxing effort. There is a normal inability to leave the home.    Community Physician to provide follow up care: Jerad Lomax M.D.     Optional Interventions? No      I certify the face to face encounter for this home health care referral meets the CMS requirements and the encounter/clinical assessment with the patient was, in whole, or in part, for the medical condition(s) listed above, which is the primary reason for home health care. Based on my clinical findings: the service(s) are medically necessary, support the need for home health care, and the homebound criteria are met.  I certify that this patient has had a face to face encounter by myself.  Sarah Ha D.O. - NPI: [id number]

## 2018-02-05 NOTE — PROGRESS NOTES
All discharge instructions discussed with patient. Patient was educated about diagnosis, medications, follow-up, and home care instructions. Patient verbalized understanding. All questions and concerns addressed at this time. Patient did not have a PIV. Discharged to home via [place] with the use of a private [employer] company.

## 2018-02-05 NOTE — PROGRESS NOTES
Received report from night shift Rn. Pt is resting comfortably, denies pain, n/v, or discomfort. A/O x4 up self with FWW. BSFS q 6 hours Pt has bilateral boots on, right great toe previously amputated, with bone removal on right side of foot. Bandage clean, dry, intact. No MD REINALDO aware. POC discussed with patient who will be discharging home today. Need to coordinate ride home with . Safety precautions in place, bed locked in low position, call light within reach. Hourly rounding in place

## 2018-02-05 NOTE — PROGRESS NOTES
Several attempts to place IV with nursing staff, guided and unsuccessful. New medication PO prescribed and no IV needed at this.  said ok for no IV at this time, receiving antibiotics PO.

## 2018-02-05 NOTE — CARE PLAN
Problem: Infection  Goal: Will remain free from infection    Intervention: Assess signs and symptoms of infection  Pt free from infections. WBC's wnl's, pt afebrile. Wound area clean and dry and progressing as expected.

## 2018-02-06 LAB
GLUCOSE BLD-MCNC: 134 MG/DL (ref 65–99)
GLUCOSE BLD-MCNC: 188 MG/DL (ref 65–99)

## 2018-02-06 NOTE — DISCHARGE SUMMARY
CHIEF COMPLAINT ON ADMISSION  Chief Complaint   Patient presents with   • Foot Problem     2 weeks post-op from foot surgery, states that his foot looks more swollen and is more painful than normal       CODE STATUS  full    HPI & HOSPITAL COURSE  This is a 52 y.o. male here with continued right foot infection after poor compliance to  his antibiotics from [facility].  He was dc home  1/29 and said he wasn't given prescription and seen in ED with complaints of pain - given another rx for zyvox and still did not go pick it up.  He missed 4 days of treatment and admitted to get IV doses of zyvox and was further educated to go to the [facility] pharmacy [address] to  his prescription that was waiting for him and no copay. He was seen by ortho and recommended he have sutures removed and continue with wound care.    The patient met 2-midnight criteria for an inpatient stay at the time of discharge.    Therefore, he is discharged in fair and stable condition with close outpatient follow-up.    SPECIFIC OUTPATIENT FOLLOW-UP  Home [place] for wound care    DISCHARGE PROBLEM LIST  Principal Problem:    Diabetic foot infection (CMS-[facility]) POA: Unknown  Active Problems:    Type 2 diabetes mellitus (CMS-[facility]) POA: Yes    Hypertension POA: Yes    HLD (hyperlipidemia) POA: Yes    Atrial fibrillation (CMS-[facility]) POA: Yes  Resolved Problems:    * No resolved hospital problems. *      FOLLOW UP  Future Appointments  Date Time Provider Department Center   2/8/2018 11:30 AM ELIZABETH Espinosa [facility] 2nd .   2/9/2018 8:00 AM Ronaldo Bernardo M.D. [facility] 2nd [facility]   2/14/2018 9:30 AM Jerad Lomax M.D. [facility]     No follow-up provider specified.    MEDICATIONS ON DISCHARGE   Sebastián Castro   Home Medication Instructions HIRAM:[id number]    Printed on:02/05/18 2005   Medication Information                      aspirin EC 81 MG EC tablet  Take 1 Tab by mouth every day.             digoxin (LANOXIN) 250 MCG Tab  Take  1 Tab by mouth every day at 6 PM.             famotidine (PEPCID) 20 MG Tab  Take 20 mg by mouth every evening.             linezolid (ZYVOX) 600 MG Tab  Take 1 Tab by mouth every 12 hours.             lisinopril (PRINIVIL, ZESTRIL) 40 MG tablet  Take 1 Tab by mouth every day.             metformin (GLUCOPHAGE) 500 MG Tab  Take 2 Tabs by mouth 2 times a day, with meals.             metoprolol (LOPRESSOR) 50 MG Tab  Take 1 Tab by mouth 2 Times a Day.             nystatin (MYCOSTATIN) powder  Apply to affected area bid             pravastatin (PRAVACHOL) 20 MG Tab  Take 1 Tab by mouth every evening.             rivaroxaban (XARELTO) 20 MG Tab tablet  Take 1 Tab by mouth with dinner.                 DIET  No orders of the defined types were placed in this encounter.      ACTIVITY  As tolerated.  Weight bearing as tolerated with offloading shoes      CONSULTATIONS  Ortho - walker    PROCEDURES  none    LABORATORY  Lab Results   Component Value Date/Time    SODIUM 135 02/03/2018 12:20 AM    POTASSIUM 3.7 02/03/2018 12:20 AM    CHLORIDE 105 02/03/2018 12:20 AM    CO2 24 02/03/2018 12:20 AM    GLUCOSE 129 (H) 02/03/2018 12:20 AM    BUN 15 02/03/2018 12:20 AM    CREATININE 0.82 02/03/2018 12:20 AM        Lab Results   Component Value Date/Time    WBC 9.4 02/03/2018 12:20 AM    HEMOGLOBIN 10.3 (L) 02/03/2018 12:20 AM    HEMATOCRIT 32.3 (L) 02/03/2018 12:20 AM    PLATELETCT 233 02/03/2018 12:20 AM        Total time of the discharge process exceeds 36 minutes

## 2018-02-06 NOTE — CONSULTS
Diabetes education: Pt known from previous admit. Pt discharged before seen this admit. CDE did speak with nursing regarding Zyvox medication which per SW/CM note from last visit had a PO and had no co pay . Pt did not  prescriptions from last admit/discharge 1/29/18.    Last visit with CDE:     Consults  Date of Service: 1/16/2018 4:14 PM  Airam Jerez R.N.      []Hide copied text  Diabetes education: Met with Sebastián, known from previous admit. Discussed goals for blood sugars (80 -150). Pt takes his blood sugars twice a day and had been below 130 until yesterday. Reviewed what effects blood sugars and questions answered.  Plan: Please call [phone] if needs change.     Previous visits:     Consults  Date of Service: 11/13/2017 6:20 PM  Airam Jerez R.N.             Diabetes education: Met with Sebastián, who is newly dx with diabetes.  Discussed what diabetes was, type two diabetes, goals for blood sugars, especially with healing wound, finger sticks, hyperglycemia, hypoglycemia, foot care and what effects blood sugars.  Discussed insulin and asked that he give his own insulin when needed in case he may need to go home on insulin.   Pt is currently on sliding scale coverage ac and hs with regular insulin. Blood sugars have been:11 /11: 210 (4 units), 154 (3 units), and 150, 11/12: 141, 159 (3 units), 144, and 124. 11/13:121, 151 (3 units and 132.  Discussed less costly oral agents and insulin as well as meters and strips.  CDE will give a meter tomorrow. If patient going home on oral agents he needs only to test once a day. Otherwise more frequently. Hg a1c was 9.2%.  Handouts given for review.  Plan: CDE will follow up tomorrow to complete education including meter and handout on resources. Please call [phone] if needs change.         Consults  Date of Service: 11/13/2017 6:20 PM  Airam Jerez R.N.            Diabetes education: Met with Sebastián, who is newly dx with diabetes.  Discussed what diabetes was,  type two diabetes, goals for blood sugars, especially with healing wound, finger sticks, hyperglycemia, hypoglycemia, foot care and what effects blood sugars.  Discussed insulin and asked that he give his own insulin when needed in case he may need to go home on insulin.   Pt is currently on sliding scale coverage ac and hs with regular insulin. Blood sugars have been:11 /11: 210 (4 units), 154 (3 units), and 150, 11/12: 141, 159 (3 units), 144, and 124. 11/13:121, 151 (3 units and 132.  Discussed less costly oral agents and insulin as well as meters and strips.  CDE will give a meter tomorrow. If patient going home on oral agents he needs only to test once a day. Otherwise more frequently. Hg a1c was 9.2%.  Handouts given for review.  Plan: CDE will follow up tomorrow to complete education including meter and handout on resources. Please call [phone] if needs change.

## 2018-02-07 ENCOUNTER — HOME CARE VISIT (OUTPATIENT)
Dept: HOME HEALTH SERVICES | Facility: HOME HEALTHCARE | Age: 53
End: 2018-02-07
Payer: MEDICAID

## 2018-02-07 VITALS
WEIGHT: 307.8 LBS | HEIGHT: 72 IN | OXYGEN SATURATION: 94 % | HEART RATE: 71 BPM | DIASTOLIC BLOOD PRESSURE: 78 MMHG | BODY MASS INDEX: 41.69 KG/M2 | SYSTOLIC BLOOD PRESSURE: 142 MMHG | TEMPERATURE: 97.8 F | RESPIRATION RATE: 16 BRPM

## 2018-02-07 PROCEDURE — A6209 FOAM DRSG <=16 SQ IN W/O BDR: HCPCS

## 2018-02-07 PROCEDURE — A6214 FOAM DRG > 48 SQ IN W/BORDER: HCPCS

## 2018-02-07 PROCEDURE — G0493 RN CARE EA 15 MIN HH/HOSPICE: HCPCS

## 2018-02-07 PROCEDURE — A6212 FOAM DRG <=16 SQ IN W/BORDER: HCPCS

## 2018-02-07 PROCEDURE — A5120 SKIN BARRIER, WIPE OR SWAB: HCPCS

## 2018-02-07 SDOH — ECONOMIC STABILITY: HOUSING INSECURITY: UNSAFE APPLIANCES: 0

## 2018-02-07 SDOH — ECONOMIC STABILITY: HOUSING INSECURITY: UNSAFE COOKING RANGE AREA: 0

## 2018-02-07 ASSESSMENT — ENCOUNTER SYMPTOMS
NAUSEA: DENIES
VOMITING: DENIES
SHORTNESS OF BREATH: T

## 2018-02-07 ASSESSMENT — ACTIVITIES OF DAILY LIVING (ADL)
HOME_HEALTH_OASIS: 02
OASIS_M1830: 03
HOME_HEALTH_OASIS: 01

## 2018-02-08 ENCOUNTER — OFFICE VISIT (OUTPATIENT)
Dept: INFECTIOUS DISEASES | Facility: MEDICAL CENTER | Age: 53
End: 2018-02-08
Payer: MEDICAID

## 2018-02-08 ENCOUNTER — RESOLUTE PROFESSIONAL BILLING HOSPITAL PROF FEE (OUTPATIENT)
Dept: HOSPITALIST | Facility: MEDICAL CENTER | Age: 53
End: 2018-02-08
Payer: MEDICAID

## 2018-02-08 ENCOUNTER — TELEPHONE (OUTPATIENT)
Dept: VASCULAR LAB | Facility: MEDICAL CENTER | Age: 53
End: 2018-02-08

## 2018-02-08 ENCOUNTER — HOME CARE VISIT (OUTPATIENT)
Dept: HOME HEALTH SERVICES | Facility: HOME HEALTHCARE | Age: 53
End: 2018-02-08
Payer: MEDICAID

## 2018-02-08 ENCOUNTER — APPOINTMENT (OUTPATIENT)
Dept: RADIOLOGY | Facility: MEDICAL CENTER | Age: 53
End: 2018-02-08
Attending: EMERGENCY MEDICINE
Payer: MEDICAID

## 2018-02-08 ENCOUNTER — HOSPITAL ENCOUNTER (OUTPATIENT)
Facility: MEDICAL CENTER | Age: 53
End: 2018-02-09
Attending: EMERGENCY MEDICINE | Admitting: HOSPITALIST
Payer: MEDICAID

## 2018-02-08 VITALS
DIASTOLIC BLOOD PRESSURE: 80 MMHG | OXYGEN SATURATION: 95 % | SYSTOLIC BLOOD PRESSURE: 124 MMHG | WEIGHT: 302.2 LBS | BODY MASS INDEX: 40.93 KG/M2 | TEMPERATURE: 97.3 F | HEIGHT: 72 IN | HEART RATE: 98 BPM

## 2018-02-08 DIAGNOSIS — A49.8 ENTEROCOCCUS FAECALIS INFECTION: ICD-10-CM

## 2018-02-08 DIAGNOSIS — L97.509 TYPE 2 DIABETES MELLITUS WITH FOOT ULCER, WITHOUT LONG-TERM CURRENT USE OF INSULIN (HCC): ICD-10-CM

## 2018-02-08 DIAGNOSIS — R07.9 CHEST PAIN, UNSPECIFIED TYPE: ICD-10-CM

## 2018-02-08 DIAGNOSIS — L08.9 DIABETIC FOOT INFECTION (HCC): ICD-10-CM

## 2018-02-08 DIAGNOSIS — E11.628 DIABETIC FOOT INFECTION (HCC): ICD-10-CM

## 2018-02-08 DIAGNOSIS — E11.621 TYPE 2 DIABETES MELLITUS WITH FOOT ULCER, WITHOUT LONG-TERM CURRENT USE OF INSULIN (HCC): ICD-10-CM

## 2018-02-08 DIAGNOSIS — I48.0 PAROXYSMAL ATRIAL FIBRILLATION (HCC): ICD-10-CM

## 2018-02-08 DIAGNOSIS — A49.02 MRSA (METHICILLIN RESISTANT STAPHYLOCOCCUS AUREUS) INFECTION: ICD-10-CM

## 2018-02-08 PROBLEM — I48.20 CHRONIC ATRIAL FIBRILLATION (HCC): Status: ACTIVE | Noted: 2018-02-08

## 2018-02-08 PROBLEM — I10 HTN (HYPERTENSION): Status: ACTIVE | Noted: 2018-02-08

## 2018-02-08 LAB
ALBUMIN SERPL BCP-MCNC: 3.6 G/DL (ref 3.2–4.9)
ALBUMIN/GLOB SERPL: 1.2 G/DL
ALP SERPL-CCNC: 53 U/L (ref 30–99)
ALT SERPL-CCNC: 8 U/L (ref 2–50)
ANION GAP SERPL CALC-SCNC: 12 MMOL/L (ref 0–11.9)
APTT PPP: 42.8 SEC (ref 24.7–36)
AST SERPL-CCNC: 13 U/L (ref 12–45)
BASOPHILS # BLD AUTO: 1 % (ref 0–1.8)
BASOPHILS # BLD: 0.08 K/UL (ref 0–0.12)
BILIRUB SERPL-MCNC: 0.5 MG/DL (ref 0.1–1.5)
BNP SERPL-MCNC: 150 PG/ML (ref 0–100)
BUN SERPL-MCNC: 13 MG/DL (ref 8–22)
CALCIUM SERPL-MCNC: 9.3 MG/DL (ref 8.5–10.5)
CHLORIDE SERPL-SCNC: 105 MMOL/L (ref 96–112)
CO2 SERPL-SCNC: 21 MMOL/L (ref 20–33)
CREAT SERPL-MCNC: 0.71 MG/DL (ref 0.5–1.4)
EKG IMPRESSION: NORMAL
EKG IMPRESSION: NORMAL
EOSINOPHIL # BLD AUTO: 0.74 K/UL (ref 0–0.51)
EOSINOPHIL NFR BLD: 9.2 % (ref 0–6.9)
ERYTHROCYTE [DISTWIDTH] IN BLOOD BY AUTOMATED COUNT: 52.2 FL (ref 35.9–50)
GLOBULIN SER CALC-MCNC: 3 G/DL (ref 1.9–3.5)
GLUCOSE BLD-MCNC: 127 MG/DL (ref 65–99)
GLUCOSE SERPL-MCNC: 132 MG/DL (ref 65–99)
HCT VFR BLD AUTO: 38.5 % (ref 42–52)
HGB BLD-MCNC: 12.9 G/DL (ref 14–18)
IMM GRANULOCYTES # BLD AUTO: 0.02 K/UL (ref 0–0.11)
IMM GRANULOCYTES NFR BLD AUTO: 0.2 % (ref 0–0.9)
INR PPP: 2.29 (ref 0.87–1.13)
LIPASE SERPL-CCNC: 10 U/L (ref 11–82)
LYMPHOCYTES # BLD AUTO: 1.95 K/UL (ref 1–4.8)
LYMPHOCYTES NFR BLD: 24.2 % (ref 22–41)
MAGNESIUM SERPL-MCNC: 1.6 MG/DL (ref 1.5–2.5)
MCH RBC QN AUTO: 28.8 PG (ref 27–33)
MCHC RBC AUTO-ENTMCNC: 33.5 G/DL (ref 33.7–35.3)
MCV RBC AUTO: 85.9 FL (ref 81.4–97.8)
MONOCYTES # BLD AUTO: 0.76 K/UL (ref 0–0.85)
MONOCYTES NFR BLD AUTO: 9.4 % (ref 0–13.4)
NEUTROPHILS # BLD AUTO: 4.5 K/UL (ref 1.82–7.42)
NEUTROPHILS NFR BLD: 56 % (ref 44–72)
NRBC # BLD AUTO: 0 K/UL
NRBC BLD-RTO: 0 /100 WBC
PLATELET # BLD AUTO: 277 K/UL (ref 164–446)
PMV BLD AUTO: 10.1 FL (ref 9–12.9)
POTASSIUM SERPL-SCNC: 4 MMOL/L (ref 3.6–5.5)
PROT SERPL-MCNC: 6.6 G/DL (ref 6–8.2)
PROTHROMBIN TIME: 24.9 SEC (ref 12–14.6)
RBC # BLD AUTO: 4.48 M/UL (ref 4.7–6.1)
SODIUM SERPL-SCNC: 138 MMOL/L (ref 135–145)
TROPONIN I SERPL-MCNC: <0.01 NG/ML (ref 0–0.04)
WBC # BLD AUTO: 8.1 K/UL (ref 4.8–10.8)

## 2018-02-08 PROCEDURE — 85730 THROMBOPLASTIN TIME PARTIAL: CPT

## 2018-02-08 PROCEDURE — 99220 PR INITIAL OBSERVATION CARE,LEVL III: CPT | Performed by: HOSPITALIST

## 2018-02-08 PROCEDURE — A9270 NON-COVERED ITEM OR SERVICE: HCPCS | Performed by: INTERNAL MEDICINE

## 2018-02-08 PROCEDURE — 83690 ASSAY OF LIPASE: CPT

## 2018-02-08 PROCEDURE — 93010 ELECTROCARDIOGRAM REPORT: CPT | Performed by: INTERNAL MEDICINE

## 2018-02-08 PROCEDURE — 83735 ASSAY OF MAGNESIUM: CPT

## 2018-02-08 PROCEDURE — 71045 X-RAY EXAM CHEST 1 VIEW: CPT

## 2018-02-08 PROCEDURE — 93005 ELECTROCARDIOGRAM TRACING: CPT

## 2018-02-08 PROCEDURE — 80053 COMPREHEN METABOLIC PANEL: CPT

## 2018-02-08 PROCEDURE — 93005 ELECTROCARDIOGRAM TRACING: CPT | Performed by: HOSPITALIST

## 2018-02-08 PROCEDURE — 85610 PROTHROMBIN TIME: CPT

## 2018-02-08 PROCEDURE — 82962 GLUCOSE BLOOD TEST: CPT

## 2018-02-08 PROCEDURE — 700102 HCHG RX REV CODE 250 W/ 637 OVERRIDE(OP): Performed by: HOSPITALIST

## 2018-02-08 PROCEDURE — 306589 SLEEVE,VASO CALF LARGE: Performed by: HOSPITALIST

## 2018-02-08 PROCEDURE — 99214 OFFICE O/P EST MOD 30 MIN: CPT | Performed by: NURSE PRACTITIONER

## 2018-02-08 PROCEDURE — 700102 HCHG RX REV CODE 250 W/ 637 OVERRIDE(OP): Performed by: INTERNAL MEDICINE

## 2018-02-08 PROCEDURE — 93005 ELECTROCARDIOGRAM TRACING: CPT | Performed by: EMERGENCY MEDICINE

## 2018-02-08 PROCEDURE — G0378 HOSPITAL OBSERVATION PER HR: HCPCS

## 2018-02-08 PROCEDURE — 84484 ASSAY OF TROPONIN QUANT: CPT

## 2018-02-08 PROCEDURE — 85025 COMPLETE CBC W/AUTO DIFF WBC: CPT

## 2018-02-08 PROCEDURE — 83880 ASSAY OF NATRIURETIC PEPTIDE: CPT

## 2018-02-08 PROCEDURE — 99285 EMERGENCY DEPT VISIT HI MDM: CPT

## 2018-02-08 PROCEDURE — A9270 NON-COVERED ITEM OR SERVICE: HCPCS | Performed by: HOSPITALIST

## 2018-02-08 RX ORDER — FAMOTIDINE 20 MG/1
20 TABLET, FILM COATED ORAL
Status: DISCONTINUED | OUTPATIENT
Start: 2018-02-08 | End: 2018-02-09 | Stop reason: HOSPADM

## 2018-02-08 RX ORDER — ASPIRIN 325 MG
325 TABLET ORAL DAILY
Status: DISCONTINUED | OUTPATIENT
Start: 2018-02-09 | End: 2018-02-08

## 2018-02-08 RX ORDER — PRAVASTATIN SODIUM 20 MG
20 TABLET ORAL EVERY EVENING
Status: DISCONTINUED | OUTPATIENT
Start: 2018-02-08 | End: 2018-02-09 | Stop reason: HOSPADM

## 2018-02-08 RX ORDER — AMLODIPINE BESYLATE 5 MG/1
5 TABLET ORAL
Status: DISCONTINUED | OUTPATIENT
Start: 2018-02-08 | End: 2018-02-09 | Stop reason: HOSPADM

## 2018-02-08 RX ORDER — ASPIRIN 300 MG/1
300 SUPPOSITORY RECTAL DAILY
Status: DISCONTINUED | OUTPATIENT
Start: 2018-02-09 | End: 2018-02-08

## 2018-02-08 RX ORDER — ACETAMINOPHEN 325 MG/1
650 TABLET ORAL EVERY 6 HOURS PRN
Status: DISCONTINUED | OUTPATIENT
Start: 2018-02-08 | End: 2018-02-09 | Stop reason: HOSPADM

## 2018-02-08 RX ORDER — POLYETHYLENE GLYCOL 3350 17 G/17G
1 POWDER, FOR SOLUTION ORAL
Status: DISCONTINUED | OUTPATIENT
Start: 2018-02-08 | End: 2018-02-09 | Stop reason: HOSPADM

## 2018-02-08 RX ORDER — LINEZOLID 600 MG/1
600 TABLET, FILM COATED ORAL EVERY 12 HOURS
Status: DISCONTINUED | OUTPATIENT
Start: 2018-02-08 | End: 2018-02-09 | Stop reason: HOSPADM

## 2018-02-08 RX ORDER — DIGOXIN 250 MCG
250 TABLET ORAL DAILY
COMMUNITY
End: 2018-03-08 | Stop reason: SDUPTHER

## 2018-02-08 RX ORDER — AMOXICILLIN 250 MG
2 CAPSULE ORAL 2 TIMES DAILY
Status: DISCONTINUED | OUTPATIENT
Start: 2018-02-08 | End: 2018-02-09 | Stop reason: HOSPADM

## 2018-02-08 RX ORDER — LISINOPRIL 20 MG/1
40 TABLET ORAL DAILY
Status: DISCONTINUED | OUTPATIENT
Start: 2018-02-09 | End: 2018-02-09 | Stop reason: HOSPADM

## 2018-02-08 RX ORDER — DEXTROSE MONOHYDRATE 25 G/50ML
25 INJECTION, SOLUTION INTRAVENOUS
Status: DISCONTINUED | OUTPATIENT
Start: 2018-02-08 | End: 2018-02-09 | Stop reason: HOSPADM

## 2018-02-08 RX ORDER — BISACODYL 10 MG
10 SUPPOSITORY, RECTAL RECTAL
Status: DISCONTINUED | OUTPATIENT
Start: 2018-02-08 | End: 2018-02-09 | Stop reason: HOSPADM

## 2018-02-08 RX ORDER — DIGOXIN 250 MCG
250 TABLET ORAL DAILY
Status: DISCONTINUED | OUTPATIENT
Start: 2018-02-09 | End: 2018-02-08

## 2018-02-08 RX ORDER — ASPIRIN 81 MG/1
324 TABLET, CHEWABLE ORAL DAILY
Status: DISCONTINUED | OUTPATIENT
Start: 2018-02-09 | End: 2018-02-08

## 2018-02-08 RX ADMIN — PRAVASTATIN SODIUM 20 MG: 20 TABLET ORAL at 20:13

## 2018-02-08 RX ADMIN — METOPROLOL TARTRATE 50 MG: 25 TABLET, FILM COATED ORAL at 20:13

## 2018-02-08 RX ADMIN — AMLODIPINE BESYLATE 5 MG: 5 TABLET ORAL at 18:40

## 2018-02-08 RX ADMIN — RIVAROXABAN 20 MG: 20 TABLET, FILM COATED ORAL at 18:40

## 2018-02-08 ASSESSMENT — ENCOUNTER SYMPTOMS
VOMITING: 0
CLAUDICATION: 0
NECK PAIN: 0
SHORTNESS OF BREATH: 0
COUGH: 0
NERVOUS/ANXIOUS: 0
SENSORY CHANGE: 0
BACK PAIN: 0
ORTHOPNEA: 0
EYE PAIN: 0
SORE THROAT: 0
BLOOD IN STOOL: 0
HEARTBURN: 0
NAUSEA: 1
SHORTNESS OF BREATH: 1
TINGLING: 0
DEPRESSION: 0
TREMORS: 0
FEVER: 0
PALPITATIONS: 0
PHOTOPHOBIA: 0
HEADACHES: 0
WEAKNESS: 1
HEMOPTYSIS: 0
BLURRED VISION: 0
MYALGIAS: 0
HEADACHES: 1
NAUSEA: 0
CHILLS: 0
DOUBLE VISION: 0
SPEECH CHANGE: 0
DIZZINESS: 0
SPUTUM PRODUCTION: 0
PND: 0
MEMORY LOSS: 0
STRIDOR: 0
CONSTIPATION: 0

## 2018-02-08 ASSESSMENT — PATIENT HEALTH QUESTIONNAIRE - PHQ9
2. FEELING DOWN, DEPRESSED, IRRITABLE, OR HOPELESS: NOT AT ALL
1. LITTLE INTEREST OR PLEASURE IN DOING THINGS: NOT AT ALL
SUM OF ALL RESPONSES TO PHQ QUESTIONS 1-9: 0
SUM OF ALL RESPONSES TO PHQ9 QUESTIONS 1 AND 2: 0

## 2018-02-08 ASSESSMENT — COPD QUESTIONNAIRES
DO YOU EVER COUGH UP ANY MUCUS OR PHLEGM?: NO/ONLY WITH OCCASIONAL COLDS OR INFECTIONS
HAVE YOU SMOKED AT LEAST 100 CIGARETTES IN YOUR ENTIRE LIFE: NO/DON'T KNOW
DURING THE PAST 4 WEEKS HOW MUCH DID YOU FEEL SHORT OF BREATH: NONE/LITTLE OF THE TIME
COPD SCREENING SCORE: 1

## 2018-02-08 ASSESSMENT — PAIN SCALES - GENERAL
PAINLEVEL_OUTOF10: 0
PAINLEVEL_OUTOF10: 0
PAINLEVEL_OUTOF10: 2
PAINLEVEL_OUTOF10: 3

## 2018-02-08 ASSESSMENT — LIFESTYLE VARIABLES
EVER_SMOKED: NEVER
ALCOHOL_USE: NO

## 2018-02-08 NOTE — ED TRIAGE NOTES
BIBA for eval of midsternal chest pain and headache that began at 1230. Hx of afib, zarelto, med compliant. NARD. A&OX4. Given 1 nitro and 324mg asa en route by ems.

## 2018-02-08 NOTE — ED PROVIDER NOTES
"ED Provider Note    Scribed for Brain Courtney M.D. by Vilma Gonzalez. 2/8/2018, 1:39 PM.    Primary care provider: Jerad Lomax M.D.  Means of arrival: EMS  History obtained from: Patient  History limited by: None    CHIEF COMPLAINT  Chief Complaint   Patient presents with   • Chest Pain   • Headache       HPI  Sebastián Castro is a 53 y.o. male who presents to the Emergency Department with a history of atrial fibrillation for evaluation of chest pain and a headache onset 1230. The patient locates his pain to his mid sternal chest and denies any radiation. He describes it as a \"pressure.\" The patient rates his pain a 3/10 in severity. Per patient, he was  Also experiencing shortness of breath, nausea, and lightheadedness when his chest pain began. He denies any calf pain or swelling. The patient reports that he was here a few weeks ago secondary to his atrial fibrillation. He states that he was given blood thinners and had to be admitted. The patient denies a history of MI. He reports having a stress test in the past. The patient has a history of diabetes and hypertension.     REVIEW OF SYSTEMS  Review of Systems   Respiratory: Positive for shortness of breath.    Cardiovascular: Positive for chest pain.   Gastrointestinal: Positive for nausea.   Musculoskeletal:        -Calf pain or swelling   Neurological: Positive for headaches.        +Lightheadedness   All other systems reviewed and are negative.   C.    PAST MEDICAL HISTORY   has a past medical history of A-fib (CMS-[facility]); Chest pain; Diabetes (CMS-[facility]); Diabetic neuropathy (CMS-[facility]); Hypercholesterolemia; Hypertension; Morbid obesity (CMS-HCC); Noncompliance; and Normal cardiac stress test.    SURGICAL HISTORY   has a past surgical history that includes toe amputation (Right, 11/10/2017) and toe amputation (Right, 1/17/2018).    SOCIAL HISTORY  Social History   Substance Use Topics   • Smoking status: Never Smoker   • Smokeless tobacco: Never Used   • " Alcohol use No      History   Drug Use No       FAMILY HISTORY  History reviewed. No pertinent family history.    CURRENT MEDICATIONS  Home Medications     Reviewed by Augusta Lockhart R.N. (Registered Nurse) on 02/08/18 at 1330  Med List Status: Not Addressed   Medication Last Dose Status   aspirin EC 81 MG EC tablet 2/8/2018 Active   digoxin (LANOXIN) 250 MCG Tab 2/8/2018 Active   famotidine (PEPCID) 20 MG Tab 2/8/2018 Active   linezolid (ZYVOX) 600 MG Tab  Active   lisinopril (PRINIVIL, ZESTRIL) 40 MG tablet 2/8/2018 Active   metformin (GLUCOPHAGE) 500 MG Tab 2/8/2018 Active   metoprolol (LOPRESSOR) 50 MG Tab 2/8/2018 Active   nystatin (MYCOSTATIN) powder 2/8/2018 Active   pravastatin (PRAVACHOL) 20 MG Tab 2/8/2018 Active   rivaroxaban (XARELTO) 20 MG Tab tablet 2/8/2018 Active                ALLERGIES  Allergies   Allergen Reactions   • Pcn [Penicillins] Hives and Rash      Rash & hives  Tolerating Ampicillin + Sulbactam 11/12/17   • Daptomycin Rash     Body rash       PHYSICAL EXAM  VITAL SIGNS: /81   Pulse 79   Temp 36.9 °C (98.4 °F)   Resp 18   Ht 1.829 m (6')   Wt (!) 134 kg (295 lb 6.7 oz)   BMI 40.07 kg/m²   Vitals reviewed.  Constitutional: Well developed, Well nourished, No acute distress, Non-toxic appearance.   HENT: Normocephalic, Atraumatic, Bilateral external ears normal, Oropharynx moist, No oral exudates, Nose normal.   Eyes: PERRL, EOMI, Conjunctiva normal, No discharge.   Neck: Normal range of motion, No tenderness, Supple, No stridor.   Cardiovascular: Tachycardic, No murmurs, No rubs, No gallops.   Thorax & Lungs: Normal breath sounds, No respiratory distress, No wheezing  Abdomen: Bowel sounds normal, Soft, No tenderness  Skin: Warm, Dry, No erythema, No rash.   Back: No tenderness, No CVA tenderness.   Musculoskeletal: Good range of motion in all major joints, Mild edema. No tenderness to palpation or major deformities noted.   Neurologic: Alert, Normal motor function,  Normal sensory function, No focal deficits noted.   Psychiatric: Affect normal    LABS  Results for orders placed or performed during the hospital encounter of 02/08/18   CBC WITH DIFFERENTIAL   Result Value Ref Range    WBC 8.1 4.8 - 10.8 K/uL    RBC 4.48 (L) 4.70 - 6.10 M/uL    Hemoglobin 12.9 (L) 14.0 - 18.0 g/dL    Hematocrit 38.5 (L) 42.0 - 52.0 %    MCV 85.9 81.4 - 97.8 fL    MCH 28.8 27.0 - 33.0 pg    MCHC 33.5 (L) 33.7 - 35.3 g/dL    RDW 52.2 (H) 35.9 - 50.0 fL    Platelet Count 277 164 - 446 K/uL    MPV 10.1 9.0 - 12.9 fL    Neutrophils-Polys 56.00 44.00 - 72.00 %    Lymphocytes 24.20 22.00 - 41.00 %    Monocytes 9.40 0.00 - 13.40 %    Eosinophils 9.20 (H) 0.00 - 6.90 %    Basophils 1.00 0.00 - 1.80 %    Immature Granulocytes 0.20 0.00 - 0.90 %    Nucleated RBC 0.00 /100 WBC    Neutrophils (Absolute) 4.50 1.82 - 7.42 K/uL    Lymphs (Absolute) 1.95 1.00 - 4.80 K/uL    Monos (Absolute) 0.76 0.00 - 0.85 K/uL    Eos (Absolute) 0.74 (H) 0.00 - 0.51 K/uL    Baso (Absolute) 0.08 0.00 - 0.12 K/uL    Immature Granulocytes (abs) 0.02 0.00 - 0.11 K/uL    NRBC (Absolute) 0.00 K/uL   COMP METABOLIC PANEL   Result Value Ref Range    Sodium 138 135 - 145 mmol/L    Potassium 4.0 3.6 - 5.5 mmol/L    Chloride 105 96 - 112 mmol/L    Co2 21 20 - 33 mmol/L    Anion Gap 12.0 (H) 0.0 - 11.9    Glucose 132 (H) 65 - 99 mg/dL    Bun 13 8 - 22 mg/dL    Creatinine 0.71 0.50 - 1.40 mg/dL    AST(SGOT) 13 12 - 45 U/L    ALT(SGPT) 8 2 - 50 U/L    Alkaline Phosphatase 53 30 - 99 U/L    Total Bilirubin 0.5 0.1 - 1.5 mg/dL    Albumin 3.6 3.2 - 4.9 g/dL    Total Protein 6.6 6.0 - 8.2 g/dL    Globulin 3.0 1.9 - 3.5 g/dL    A-G Ratio 1.2 g/dL    Calcium 9.3 8.5 - 10.5 mg/dL   LIPASE   Result Value Ref Range    Lipase 10 (L) 11 - 82 U/L   TROPONIN   Result Value Ref Range    Troponin I <0.01 0.00 - 0.04 ng/mL   BTYPE NATRIURETIC PEPTIDE   Result Value Ref Range    B Natriuretic Peptide 150 (H) 0 - 100 pg/mL   APTT   Result Value Ref Range     APTT 42.8 (H) 24.7 - 36.0 sec   PROTHROMBIN TIME (INR)   Result Value Ref Range    PT 24.9 (H) 12.0 - 14.6 sec    INR 2.29 (H) 0.87 - 1.13   Troponin - STAT Once   Result Value Ref Range    Troponin I <0.01 0.00 - 0.04 ng/mL   ESTIMATED GFR   Result Value Ref Range    GFR If African American >60 >60 mL/min/1.73 m 2    GFR If Non African American >60 >60 mL/min/1.73 m 2   MAGNESIUM   Result Value Ref Range    Magnesium 1.6 1.5 - 2.5 mg/dL   EKG (NOW)   Result Value Ref Range    Report       [facility] Emergency Dept.    Test Date:  2018  Pt Name:    EMELY FLORES                Department: ER  MRN:        [id number]                      Room:       [facility]  Gender:     Male                         Technician: [id number]  :        1965                   Requested By:ER TRIAGE PROTOCOL  Order #:    [id number]                    Reading MD:    Measurements  Intervals                                Axis  Rate:       102                          P:  WI:                                      QRS:        -17  QRSD:       92                           T:          54  QT:         332  QTc:        433    Interpretive Statements  ATRIAL FIBRILLATION, V-RATE    MULTIFORM VENTRICULAR PREMATURE COMPLEXES  BORDERLINE LEFT AXIS DEVIATION  LOW VOLTAGE IN FRONTAL LEADS  Compared to ECG 2018 03:24:46  Ventricular premature complex(es) now present  Low QRS voltage now present  Sinus rhythm no longer present  First degree AV block no longer present       All labs reviewed by me.    EKG Interpretation  Interpreted by me    EKG Interpretation    Interpreted by me    Rhythm: atrial fibrillation - rapid  Rate: tachycardia  Axis: normal  Ectopy: none  Conduction: irregularly irregular  ST Segments: nonspecific changes  T Waves: no acute change  Q Waves: nonspecific    Clinical Impression: atrial fibrillation with rapid ventricular rate    RADIOLOGY  DX-CHEST-PORTABLE (1 VIEW)   Final Result         1. Stable  cardiomegaly No pulmonary infiltrates or consolidations are noted.        The radiologist's interpretation of all radiological studies have been reviewed by me.    COURSE & MEDICAL DECISION MAKING  Pertinent Labs & Imaging studies reviewed. (See chart for details)    Obtained and reviewed past medical records from previous ER visits for baseline labs.  A previous chest pain workup.    1:39 PM Patient seen and examined at bedside. The patient presents with chest pain and headache, and the differential diagnosis includes but is not limited to , ACS, chest wall pain, anxiety, A. fib, MI, PE.. Ordered for CMP, chest x-ray, Lipase, Troponin, B type natriuretic peptide, APTT, INR, CBC with differential, and an EKG to evaluate.     3:34 PM Spoke with Dr. Urena, Hospitalist, about the patient's condition. He will admit the patient.    \I don't think the patient requires a PE workup because he is chronically on anticoagulation for A. fib.  He has multiple as for chest pain including a recent stress test.  The question is with his cardiac risk factors for coronary angiogram, cardiology was consulted. .  The patient has a heart score of 4.    4:52 PM Paged Cardiology.  Dr. Fernandez who will see the patient.  The patient is admitted in guarded condition.      DISPOSITION:  Patient will be admitted to Dr. Urena in guarded condition.    FINAL IMPRESSION  1. Chest pain, unspecified type    2. Paroxysmal atrial fibrillation (CMS-HCC)          Vilma CLARKE (Antonina), am scribing for, and in the presence of, Brian Courtney M.D..    Electronically signed by: Vilma Carey), 2/8/2018    Brian CLARKE M.D. personally performed the services described in this documentation, as scribed by Vilma Gonzalez in my presence, and it is both accurate and complete.    The note accurately reflects work and decisions made by me.  Brian Courtney  2/8/2018  8:56 PM

## 2018-02-08 NOTE — PROGRESS NOTES
[facility]    Subjective:     Chief Complaint   Patient presents with   • Hospital Follow-up     diabetic foot ulcer at surgical site     This is my first time meeting Mr. Castro.    Interval History: 53 y.o. Male with a hx of DM, hyperlipidemia, arrhythmias- Afib, obesity & HTN.  Hospitalized from 1/15- 1/24/18, admitted with pain and an open wound at the site of his right great toe amputation.  His R great toe was amputated on 11/10/17 by Dr. Leo.  He had subsequent failure to thrive and recurrent hospitalizations in November and December 2017 due to inability to bear weight on his left knee from a fall, torn menisci, and degenerative joint disease. On 12/1/17 he was discharged to a skilled nursing facility with recommendations to continue IV Ertapenem post-op through 12/22/17 for Proteus and GBS.  While at SNF he noted increased drainage from his prior surgical site-yellowish. Pt was discharged home and seen by HH who noted the wound breaking open.  Cx on 12/28/17 +E faecium.  The wound continued to worsen so he was sent to ED with erythema and tenderness.  Wound Cx on 1/15/18 +MRSA & E faecalis.  Pt underwent a R foot 1st ray amp on 1/17 with Dr. Delong.  OR cx on 1/17 +MRSA, E faecalis & Candida albicans.  Pt discharged home on IV Daptomycin through 2/17.  Unfortunately, the pt was re-hospitalized from 1/25- 1/29 d/t Afib with RVR & drug rash from the Daptomycin.  Pt transitioned over to PO Zyvox by Dr. Holley.  Pt seen in ER on 2/1 for chest pain.  Pt readmitted again from 2/2- 2/5/18 d/t foot infection- pt stated he was not given script for Zyvox; however, it had been placed electronically.  Pt discharged once again on Zyvox x 7 days, end date 2/16/18.    Hospital records reviewed    Today, 2/8/2018: Patient reports feeling well and has been tolerating the PO Zyvox without adverse effect.  Pt states that he completed the Zyvox this am (however, he is suppose to be taking until 2/16/18).  He  noted some nausea, no emesis.  Denies feeling generally ill, fevers/chills, general malaise, headache, diarrhea, abdominal pain or chest pain.  Has some shortness of breath with activity, resolves with rest.  Pt being followed by the [facility].  Stating that the wounds are healing well and sutures have been removed from the surgical site.  He reports minimal drainage- brownish red color, no odor, minimal redness, no pain/tenderness and mild swelling.  He has a FU appt with Dr. Delong tomorrow.  He states that the drug rash from the Daptomycin has resolved.      ROS  As documented above in my HPI.    Past Medical History:   Diagnosis Date   • A-fib (CMS-HCC)    • Chest pain    • Diabetes (CMS-HCC)    • Diabetic neuropathy (CMS-HCC)    • Hypercholesterolemia    • Hypertension    • Morbid obesity (CMS-HCC)    • Noncompliance    • Normal cardiac stress test        Social History   Substance Use Topics   • Smoking status: Never Smoker   • Smokeless tobacco: Never Used   • Alcohol use No       Allergies: Pcn [penicillins] and Daptomycin    Pt's medication and problem list reviewed.     Objective:     PE:  /80   Pulse 98   Temp 36.3 °C (97.3 °F)   Ht 1.829 m (6')   Wt (!) 137.1 kg (302 lb 3.2 oz)   SpO2 95%   BMI 40.99 kg/m²     Vital signs reviewed    Constitutional: Appears well-developed and well-nourished. No acute distress.  Morbidly obese.  Disheveled- dirty clothing.    Eyes: Conjunctivae normal and EOM are normal. Pupils are equal, round, and reactive to light.   Neck: Trachea midline. Normal range of motion. No JVD.  Cardiovascular: Normal rate, irregular rhythm, normal heart sounds.  Faint RLE distal pulses. No murmur, gallop, or friction rub. +1 BLE edema.  Respiratory: No respiratory distress, unlabored respiratory effort.  Lungs clear to auscultation bilaterally, slightly diminished in bilateral bases. No wheezes or rales.   Abdomen: Soft, non tender, non-distended. BS + x 4. No masses.    Musculoskeletal: Shuffling gait, FWW for assistance.  Normal range of motion.  Post op shoe to RLE.    R medial foot, surgical site- 2 x 2.5 cm scab to proximal portion of surgical incision, trace surrounding erythema, no drainage, no odor.  0.7 x 0.2 x 0.1 cm wound to distal portion of surgical incision, scant serous drainage, no odor, trace maceration, no erythema.  R lateral foot- proximal site is about 1.5 x 1 cm scabbed over, no drainage, no odor, no erythema. Distal site is about 2 x 1.5 cm scabbed over, slightly boggy, scant ss drainage, no odor, trace surrounding erythema.  Skin: Warm and dry. No visible rashes or lesions.  Neurological: No cranial nerve deficit. Coordination normal.  Decreased RLE sensation.  Psychiatric: Alert and oriented to person, place, and time.     Labs:   Ref. Range 2/3/2018 00:20   WBC Latest Ref Range: 4.8 - 10.8 K/uL 9.4   RBC Latest Ref Range: 4.70 - 6.10 M/uL 3.78 (L)   Hemoglobin Latest Ref Range: 14.0 - 18.0 g/dL 10.3 (L)   Hematocrit Latest Ref Range: 42.0 - 52.0 % 32.3 (L)   MCV Latest Ref Range: 81.4 - 97.8 fL 85.4   MCH Latest Ref Range: 27.0 - 33.0 pg 27.2   MCHC Latest Ref Range: 33.7 - 35.3 g/dL 31.9 (L)   RDW Latest Ref Range: 35.9 - 50.0 fL 52.0 (H)   Platelet Count Latest Ref Range: 164 - 446 K/uL 233   MPV Latest Ref Range: 9.0 - 12.9 fL 10.5      Ref. Range 2/3/2018 00:20   Sodium Latest Ref Range: 135 - 145 mmol/L 135   Potassium Latest Ref Range: 3.6 - 5.5 mmol/L 3.7   Chloride Latest Ref Range: 96 - 112 mmol/L 105   Co2 Latest Ref Range: 20 - 33 mmol/L 24   Anion Gap Latest Ref Range: 0.0 - 11.9  6.0   Glucose Latest Ref Range: 65 - 99 mg/dL 129 (H)   Bun Latest Ref Range: 8 - 22 mg/dL 15   Creatinine Latest Ref Range: 0.50 - 1.40 mg/dL 0.82   GFR If  Latest Ref Range: >60 mL/min/1.73 m 2 >60   GFR If Non  Latest Ref Range: >60 mL/min/1.73 m 2 >60   Calcium Latest Ref Range: 8.5 - 10.5 mg/dL 8.1 (L)     Assessment and Plan:    The following treatment plan was discussed with patient at length:    1. Diabetic foot infection (CMS-[facility])      -Unclear why pt believes his Zyvox is complete.  Pt suppose to be taking through 2/16/18.  Encouraged to check his medications when he returns home and call ID if he does not have anymore Zyvox through 2/16/18.  -As long as the wound continues to progress, then no additional abx to follow.  Monitor for s/sx of worsening once off abx: increased redness, pain, swelling, drainage, breakdown of surgical site, fevers, chills, general malaise, etc.  Notify ID or go to ER should these s/sx occur.  -Continue WC as directed with HH.   2. MRSA (methicillin resistant Staphylococcus aureus) infection      As above.   3. Enterococcus faecalis infection      As above.   4. Type 2 diabetes mellitus with foot ulcer, without long-term current use of insulin (CMS-[facility])      HgA1C 6.8% on 2/2/18.  Continue to monitor blood sugars closely as DM will impair wound healing and can worsen infection.  Discussed diabetic foot care.  Advised to not go barefoot, check feet everyday and to check the inside of shoes before putting them on. Recommended pt to call the clinic immediately with any new and/or worsening foot injuries and/or signs of infection.     Follow up: PRN, RTC sooner if needed. FU with PCP for ongoing chronic medical conditions.     CRYSTAL Espinosa.

## 2018-02-08 NOTE — ED NOTES
Presents to the ED with c/o midsternal chest pain that began 30 mins PTA. Hx of Afib. Pain reduced with nitro. NARD. A&OX4.

## 2018-02-09 ENCOUNTER — APPOINTMENT (OUTPATIENT)
Dept: WOUND CARE | Facility: MEDICAL CENTER | Age: 53
End: 2018-02-09
Attending: ORTHOPAEDIC SURGERY
Payer: MEDICAID

## 2018-02-09 VITALS
BODY MASS INDEX: 40.01 KG/M2 | HEIGHT: 72 IN | HEART RATE: 75 BPM | RESPIRATION RATE: 17 BRPM | OXYGEN SATURATION: 95 % | SYSTOLIC BLOOD PRESSURE: 136 MMHG | DIASTOLIC BLOOD PRESSURE: 64 MMHG | WEIGHT: 295.42 LBS | TEMPERATURE: 97.9 F

## 2018-02-09 LAB
ALBUMIN SERPL BCP-MCNC: 3.2 G/DL (ref 3.2–4.9)
ALBUMIN/GLOB SERPL: 1.1 G/DL
ALP SERPL-CCNC: 53 U/L (ref 30–99)
ALT SERPL-CCNC: 11 U/L (ref 2–50)
ANION GAP SERPL CALC-SCNC: 6 MMOL/L (ref 0–11.9)
AST SERPL-CCNC: 14 U/L (ref 12–45)
BASOPHILS # BLD AUTO: 1 % (ref 0–1.8)
BASOPHILS # BLD: 0.08 K/UL (ref 0–0.12)
BILIRUB SERPL-MCNC: 0.7 MG/DL (ref 0.1–1.5)
BUN SERPL-MCNC: 10 MG/DL (ref 8–22)
CALCIUM SERPL-MCNC: 8.8 MG/DL (ref 8.5–10.5)
CHLORIDE SERPL-SCNC: 101 MMOL/L (ref 96–112)
CO2 SERPL-SCNC: 28 MMOL/L (ref 20–33)
CREAT SERPL-MCNC: 0.63 MG/DL (ref 0.5–1.4)
EOSINOPHIL # BLD AUTO: 0.83 K/UL (ref 0–0.51)
EOSINOPHIL NFR BLD: 10.1 % (ref 0–6.9)
ERYTHROCYTE [DISTWIDTH] IN BLOOD BY AUTOMATED COUNT: 52.3 FL (ref 35.9–50)
GLOBULIN SER CALC-MCNC: 2.9 G/DL (ref 1.9–3.5)
GLUCOSE BLD-MCNC: 152 MG/DL (ref 65–99)
GLUCOSE SERPL-MCNC: 132 MG/DL (ref 65–99)
HCT VFR BLD AUTO: 36.1 % (ref 42–52)
HGB BLD-MCNC: 11.5 G/DL (ref 14–18)
IMM GRANULOCYTES # BLD AUTO: 0.03 K/UL (ref 0–0.11)
IMM GRANULOCYTES NFR BLD AUTO: 0.4 % (ref 0–0.9)
LYMPHOCYTES # BLD AUTO: 2.37 K/UL (ref 1–4.8)
LYMPHOCYTES NFR BLD: 29 % (ref 22–41)
MCH RBC QN AUTO: 27.4 PG (ref 27–33)
MCHC RBC AUTO-ENTMCNC: 31.9 G/DL (ref 33.7–35.3)
MCV RBC AUTO: 86 FL (ref 81.4–97.8)
MONOCYTES # BLD AUTO: 0.87 K/UL (ref 0–0.85)
MONOCYTES NFR BLD AUTO: 10.6 % (ref 0–13.4)
NEUTROPHILS # BLD AUTO: 4 K/UL (ref 1.82–7.42)
NEUTROPHILS NFR BLD: 48.9 % (ref 44–72)
NRBC # BLD AUTO: 0 K/UL
NRBC BLD-RTO: 0 /100 WBC
PLATELET # BLD AUTO: 289 K/UL (ref 164–446)
PMV BLD AUTO: 10.1 FL (ref 9–12.9)
POTASSIUM SERPL-SCNC: 3.9 MMOL/L (ref 3.6–5.5)
PROT SERPL-MCNC: 6.1 G/DL (ref 6–8.2)
RBC # BLD AUTO: 4.2 M/UL (ref 4.7–6.1)
SODIUM SERPL-SCNC: 135 MMOL/L (ref 135–145)
TROPONIN I SERPL-MCNC: <0.01 NG/ML (ref 0–0.04)
WBC # BLD AUTO: 8.2 K/UL (ref 4.8–10.8)

## 2018-02-09 PROCEDURE — 96372 THER/PROPH/DIAG INJ SC/IM: CPT

## 2018-02-09 PROCEDURE — 700102 HCHG RX REV CODE 250 W/ 637 OVERRIDE(OP): Performed by: HOSPITALIST

## 2018-02-09 PROCEDURE — 80053 COMPREHEN METABOLIC PANEL: CPT

## 2018-02-09 PROCEDURE — 700102 HCHG RX REV CODE 250 W/ 637 OVERRIDE(OP): Performed by: INTERNAL MEDICINE

## 2018-02-09 PROCEDURE — A9270 NON-COVERED ITEM OR SERVICE: HCPCS | Performed by: HOSPITALIST

## 2018-02-09 PROCEDURE — A9270 NON-COVERED ITEM OR SERVICE: HCPCS | Performed by: INTERNAL MEDICINE

## 2018-02-09 PROCEDURE — 85025 COMPLETE CBC W/AUTO DIFF WBC: CPT

## 2018-02-09 PROCEDURE — 84484 ASSAY OF TROPONIN QUANT: CPT

## 2018-02-09 PROCEDURE — G0378 HOSPITAL OBSERVATION PER HR: HCPCS

## 2018-02-09 PROCEDURE — 99217 PR OBSERVATION CARE DISCHARGE: CPT | Performed by: HOSPITALIST

## 2018-02-09 PROCEDURE — 82962 GLUCOSE BLOOD TEST: CPT

## 2018-02-09 RX ORDER — LINEZOLID 600 MG/1
600 TABLET, FILM COATED ORAL EVERY 12 HOURS
Qty: 14 TAB | Refills: 0 | Status: SHIPPED | OUTPATIENT
Start: 2018-02-09 | End: 2018-02-09

## 2018-02-09 RX ORDER — LINEZOLID 600 MG/1
600 TABLET, FILM COATED ORAL EVERY 12 HOURS
Qty: 14 TAB | Refills: 0 | Status: SHIPPED | OUTPATIENT
Start: 2018-02-09 | End: 2018-02-16

## 2018-02-09 RX ORDER — METOPROLOL TARTRATE 75 MG/1
75 TABLET, FILM COATED ORAL 2 TIMES DAILY
Qty: 60 TAB | Refills: 3 | Status: SHIPPED | OUTPATIENT
Start: 2018-02-09 | End: 2018-03-07

## 2018-02-09 RX ORDER — AMLODIPINE BESYLATE 5 MG/1
5 TABLET ORAL DAILY
Qty: 30 TAB | Refills: 3 | Status: SHIPPED | OUTPATIENT
Start: 2018-02-10 | End: 2018-03-08 | Stop reason: SDUPTHER

## 2018-02-09 RX ADMIN — METOPROLOL TARTRATE 25 MG: 25 TABLET, FILM COATED ORAL at 11:14

## 2018-02-09 RX ADMIN — AMLODIPINE BESYLATE 5 MG: 5 TABLET ORAL at 10:34

## 2018-02-09 RX ADMIN — LINEZOLID 600 MG: 600 TABLET, FILM COATED ORAL at 08:30

## 2018-02-09 RX ADMIN — METOPROLOL TARTRATE 50 MG: 25 TABLET, FILM COATED ORAL at 08:29

## 2018-02-09 RX ADMIN — LISINOPRIL 40 MG: 20 TABLET ORAL at 09:27

## 2018-02-09 RX ADMIN — INSULIN HUMAN 2 UNITS: 100 INJECTION, SOLUTION PARENTERAL at 06:22

## 2018-02-09 ASSESSMENT — CHA2DS2 SCORE
PRIOR STROKE OR TIA OR THROMBOEMBOLISM: NO
AGE 65 TO 74: NO
CHA2DS2 VASC SCORE: 2
HYPERTENSION: YES
AGE 75 OR GREATER: NO
DIABETES: YES
CHF OR LEFT VENTRICULAR DYSFUNCTION: NO
VASCULAR DISEASE: NO
SEX: MALE

## 2018-02-09 ASSESSMENT — PAIN SCALES - GENERAL
PAINLEVEL_OUTOF10: 0

## 2018-02-09 NOTE — DISCHARGE PLANNING
Called [facility], after faxing of script information to them, to see if approval for patient's Zyvox has gone through.  Pharmacist said that plan limitation had been exceeded and patient could not receive any more.  Pharmacist was questioned as information from [facility] was that current authorization did not  until .  Pharmacist stated that patient had not picked script that had been submitted in late January and the prescription was still waiting for him at the pharmacy.  NP informed.

## 2018-02-09 NOTE — CONSULTS
DATE OF SERVICE:  2018    CHIEF COMPLAINT:  Evaluation of atrial fibrillation and chest pain.    HISTORY OF PRESENT ILLNESS:  The patient is a 53-year-old diabetic seen in   consultation at the request of Dr. Urena for evaluation of above   problems.  The patient was walking at the wound care clinic today when he felt   lightheaded and felt his heart racing.  He had localized chest pain that was   pleuritic in nature and did not radiate to his neck or jaw.  He was seen in   the ER where his troponins were negative x2 and admitted to the hospital.    He has a history of atrial fibrillation; apparently recent onset.  The patient   is on Xarelto for embolic prophylaxis and digoxin and beta blockers for rate   control.    It should be noted that the patient has had a recent cardiac workup with a   myocardial perfusion scan on 2018 (9 days ago) that was normal.  He had   an echocardiogram on the  of the month that revealed an ejection fraction   of 60%, mild LVH and no significant valvular abnormality.    His risk factor profile is positive for hypertension, diabetes and   hyperlipidemia.  He is a nonsmoker.  There is a positive history of coronary   artery disease and father  of a heart attack at age 45.    MEDICATIONS  ON ADMISSION:  Aspirin 81 mg a day, Lanoxin 0.25 mg a day,   amlodipine 20 mg a day, Zyvox 600 mg tablets every 12 hours, lisinopril 40 mg   a day, metformin 500 mg tablets 2 tablets twice daily, metoprolol 50 mg twice   daily, nystatin powder, pravastatin 20 mg a day, and rivaroxaban 20 mg a day.    ILLNESS:  Diabetes mellitus type 2, hyperlipidemia, chronic anticoagulation,   persistent atrial fibrillation, hypertension, and chronic foot infection.    PRIOR SURGERIES:  Amputation of great toe on the right.    REVIEW OF SYSTEMS:  CONSTITUTIONAL:  Patient states he lost over 100 pounds previously.  He states   his diabetic diagnosis is recent.  EYES:  No recent visual  problems.  NEUROLOGIC:  No stroke or TIA.  PULMONARY:  He denies sleep apnea.  CARDIAC:  As above.  GASTROINTESTINAL:  Denies melena.  RENAL:  Denies any kidney impairment.  HEMATOPOIETIC:  He is on anticoagulation.  PSYCHIATRIC:  Denies depression.    PHYSICAL EXAMINATION:  GENERAL:  Reveals an obese gentleman who is resting quite comfortably.  VITAL SIGNS:  On the monitor, he is in atrial fibrillation, at 111 beats per   minute, blood pressure 139/82.  HEENT:  Pupils are equal, gaze conjugate, sclerae nonicteric.  NECK:  Obese.  LUNGS:  Clear.  HEART:  Irregularly irregular rhythm.  There is no S3, no murmur.  ABDOMEN:  Obese and soft.  No palpable masses.  EXTREMITIES:  There is trace 1+ edema.  The right great toe is missing.  There   is a bandage over the right forefoot.  Posterior tibial pulses are palpable.    EKG today demonstrates atrial fibrillation with fairly well controlled   ventricular response rate, normal R-wave progression and left axis deviation.    No acute changes are present.    IMPRESSION AND PLAN:  1.  Atrial fibrillation.  EKG, apparently the patient has been in atrial   fibrillation since his hospitalization in late January.  His rate is fairly   well controlled.  He is on appropriate embolic prophylaxis.  2.  Chest pain.  The patient's pain is atypical and that it was very localized   and pleuritic.  He had a normal myocardial perfusion scan approximately 9   days ago.  I would not recommend any further cardiac testing at this time.  3.  Hypertension.  Blood pressure is elevated.  He is on high-dose lisinopril   and metoprolol.  I would consider adding amlodipine to his regimen for better   blood pressure control.  4.  Probable sleep apnea.  Patient has a history of obesity.  He denies any   sleep apnea, but has never had an evaluation for this.  5.  Diabetes mellitus type 2.    As noted above, the patient has had a complete cardiac workup within 2 weeks.    We would not recommend any  coronary angiography at this time.  Amlodipine was   added to better control blood pressure.  We will see again if any new cardiac   issues arise.       ____________________________________     MD GLORIA SHARP / NOEMY    DD:  02/08/2018 17:53:28  DT:  02/08/2018 21:24:23    D#:  [id number]  Job#:  [id number]

## 2018-02-09 NOTE — PROGRESS NOTES
Received report from evening Rn.  Pt is A/Ox4.  Respirations are even and unlabored on RA.  Pt denies any palpitations or chest pressure at this time.  Monitor showing A-fib HR 81, No cardiac events recorded over the evening.  Will continue to closely monitor. Call light with reach.  Pt has wound on right foot.  Wound care placed by evening RN.  POC reviewed, verbalized understanding.

## 2018-02-09 NOTE — PROGRESS NOTES
Assessment per flow chart. Pt on tele monitor, Afib  Noted, HR-92. Pt A&Ox4, denies pain, dizziness.  Left A/C IV site- c,d,i.  Pt's SpO2-94% on R/A.  Discussed POC with pt, pt verbalized understanding and agreement.  Pt instructed to call for assistance, pt's belonging's and call light within reach. Will continue to monitor.

## 2018-02-09 NOTE — DISCHARGE INSTRUCTIONS
Discharge Instructions    Discharged to home by car with friend. Discharged via wheelchair, hospital escort: Yes.  Special equipment needed: Not Applicable    Be sure to schedule a follow-up appointment with your primary care doctor or any specialists as instructed.     Discharge Plan:   Diet Plan: Discussed  Activity Level: Discussed  Confirmed Follow up Appointment: Appointment Scheduled  Confirmed Symptoms Management: Discussed  Medication Reconciliation Updated: Yes  Influenza Vaccine Indication: Not indicated: Previously immunized this influenza season and > 8 years of age    I understand that a diet low in cholesterol, fat, and sodium is recommended for good health. Unless I have been given specific instructions below for another diet, I accept this instruction as my diet prescription.   Other diet:     Special Instructions: None    · Is patient discharged on Warfarin / Coumadin?   No     Depression / Suicide Risk    As you are discharged from this [facility] facility, it is important to learn how to keep safe from harming yourself.    Recognize the warning signs:  · Abrupt changes in personality, positive or negative- including increase in energy   · Giving away possessions  · Change in eating patterns- significant weight changes-  positive or negative  · Change in sleeping patterns- unable to sleep or sleeping all the time   · Unwillingness or inability to communicate  · Depression  · Unusual sadness, discouragement and loneliness  · Talk of wanting to die  · Neglect of personal appearance   · Rebelliousness- reckless behavior  · Withdrawal from people/activities they love  · Confusion- inability to concentrate     If you or a loved one observes any of these behaviors or has concerns about self-harm, here's what you can do:  · Talk about it- your feelings and reasons for harming yourself  · Remove any means that you might use to hurt yourself (examples: pills, rope, extension cords, firearm)  · Get  professional help from the community (Mental Health, Substance Abuse, psychological counseling)  · Do not be alone:Call your Safe Contact- someone whom you trust who will be there for you.  · Call your local CRISIS HOTLINE 072-5352 or 576-418-4131  · Call your local Children's Mobile Crisis Response Team [place] (852) 508-7302 or www.[employer]  · Call the toll free National Suicide Prevention Hotlines   · National Suicide Prevention Lifeline 320-651-DZAU (8065)  · [employer] Line Network 800-SUICIDE (813-2221)      Hypertension  Hypertension, commonly called high blood pressure, is when the force of blood pumping through your arteries is too strong. Your arteries are the blood vessels that carry blood from your heart throughout your body. A blood pressure reading consists of a higher number over a lower number, such as 110/72. The higher number (systolic) is the pressure inside your arteries when your heart pumps. The lower number (diastolic) is the pressure inside your arteries when your heart relaxes. Ideally you want your blood pressure below 120/80.  Hypertension forces your heart to work harder to pump blood. Your arteries may become narrow or stiff. Having untreated or uncontrolled hypertension can cause heart attack, stroke, kidney disease, and other problems.  RISK FACTORS  Some risk factors for high blood pressure are controllable. Others are not.   Risk factors you cannot control include:   · Race. You may be at higher risk if you are .  · Age. Risk increases with age.  · Gender. Men are at higher risk than women before age 45 years. After age 65, women are at higher risk than men.  Risk factors you can control include:  · Not getting enough exercise or physical activity.  · Being overweight.  · Getting too much fat, sugar, calories, or salt in your diet.  · Drinking too much alcohol.  SIGNS AND SYMPTOMS  Hypertension does not usually cause signs or symptoms. Extremely high  blood pressure (hypertensive crisis) may cause headache, anxiety, shortness of breath, and nosebleed.  DIAGNOSIS  To check if you have hypertension, your health care provider will measure your blood pressure while you are seated, with your arm held at the level of your heart. It should be measured at least twice using the same arm. Certain conditions can cause a difference in blood pressure between your right and left arms. A blood pressure reading that is higher than normal on one occasion does not mean that you need treatment. If it is not clear whether you have high blood pressure, you may be asked to return on a different day to have your blood pressure checked again. Or, you may be asked to monitor your blood pressure at home for 1 or more weeks.  TREATMENT  Treating high blood pressure includes making lifestyle changes and possibly taking medicine. Living a healthy lifestyle can help lower high blood pressure. You may need to change some of your habits.  Lifestyle changes may include:  · Following the DASH diet. This diet is high in fruits, vegetables, and whole grains. It is low in salt, red meat, and added sugars.  · Keep your sodium intake below 2,300 mg per day.  · Getting at least 30-45 minutes of aerobic exercise at least 4 times per week.  · Losing weight if necessary.  · Not smoking.  · Limiting alcoholic beverages.  · Learning ways to reduce stress.  Your health care provider may prescribe medicine if lifestyle changes are not enough to get your blood pressure under control, and if one of the following is true:  · You are 18-59 years of age and your systolic blood pressure is above 140.  · You are 60 years of age or older, and your systolic blood pressure is above 150.  · Your diastolic blood pressure is above 90.  · You have diabetes, and your systolic blood pressure is over 140 or your diastolic blood pressure is over 90.  · You have kidney disease and your blood pressure is above 140/90.  · You  have heart disease and your blood pressure is above 140/90.  Your personal target blood pressure may vary depending on your medical conditions, your age, and other factors.  HOME CARE INSTRUCTIONS  · Have your blood pressure rechecked as directed by your health care provider.    · Take medicines only as directed by your health care provider. Follow the directions carefully. Blood pressure medicines must be taken as prescribed. The medicine does not work as well when you skip doses. Skipping doses also puts you at risk for problems.  · Do not smoke.    · Monitor your blood pressure at home as directed by your health care provider.   SEEK MEDICAL CARE IF:   · You think you are having a reaction to medicines taken.  · You have recurrent headaches or feel dizzy.  · You have swelling in your ankles.  · You have trouble with your vision.  SEEK IMMEDIATE MEDICAL CARE IF:  · You develop a severe headache or confusion.  · You have unusual weakness, numbness, or feel faint.  · You have severe chest or abdominal pain.  · You vomit repeatedly.  · You have trouble breathing.  MAKE SURE YOU:   · Understand these instructions.  · Will watch your condition.  · Will get help right away if you are not doing well or get worse.     This information is not intended to replace advice given to you by your health care provider. Make sure you discuss any questions you have with your health care provider.     Document Released: 12/18/2006 Document Revised: 05/03/2016 Document Reviewed: 10/10/2014  [employer] Interactive Patient Education ©2016 [employer]    Atrial Fibrillation  Atrial fibrillation is a type of irregular heart rhythm (arrhythmia). During atrial fibrillation, the upper chambers of the heart (atria) quiver continuously in a chaotic pattern. This causes an irregular and often rapid heart rate.   Atrial fibrillation is the result of the heart becoming overloaded with disorganized signals that tell it to beat. These signals are  normally released one at a time by a part of the right atrium called the sinoatrial node. They then travel from the atria to the lower chambers of the heart (ventricles), causing the atria and ventricles to contract and pump blood as they pass. In atrial fibrillation, parts of the atria outside of the sinoatrial node also release these signals. This results in two problems. First, the atria receive so many signals that they do not have time to fully contract. Second, the ventricles, which can only receive one signal at a time, beat irregularly and out of rhythm with the atria.   There are three types of atrial fibrillation:   · Paroxysmal. Paroxysmal atrial fibrillation starts suddenly and stops on its own within a week.  · Persistent. Persistent atrial fibrillation lasts for more than a week. It may stop on its own or with treatment.  · Permanent. Permanent atrial fibrillation does not go away. Episodes of atrial fibrillation may lead to permanent atrial fibrillation.  Atrial fibrillation can prevent your heart from pumping blood normally. It increases your risk of stroke and can lead to heart failure.   CAUSES   · Heart conditions, including a heart attack, heart failure, coronary artery disease, and heart valve conditions.    · Inflammation of the sac that surrounds the heart (pericarditis).  · Blockage of an artery in the lungs (pulmonary embolism).  · Pneumonia or other infections.  · Chronic lung disease.  · Thyroid problems, especially if the thyroid is overactive (hyperthyroidism).  · Caffeine, excessive alcohol use, and use of some illegal drugs.    · Use of some medicines, including certain decongestants and diet pills.  · Heart surgery.    · Birth defects.    Sometimes, no cause can be found. When this happens, the atrial fibrillation is called lone atrial fibrillation. The risk of complications from atrial fibrillation increases if you have lone atrial fibrillation and you are age 60 years or  older.  RISK FACTORS  · Heart failure.  · Coronary artery disease.  · Diabetes mellitus.    · High blood pressure (hypertension).    · Obesity.    · Other arrhythmias.    · Increased age.  SIGNS AND SYMPTOMS   · A feeling that your heart is beating rapidly or irregularly.    · A feeling of discomfort or pain in your chest.    · Shortness of breath.    · Sudden light-headedness or weakness.    · Getting tired easily when exercising.    · Urinating more often than normal (mainly when atrial fibrillation first begins).    In paroxysmal atrial fibrillation, symptoms may start and suddenly stop.  DIAGNOSIS   Your health care provider may be able to detect atrial fibrillation when taking your pulse. Your health care provider may have you take a test called an ambulatory electrocardiogram (ECG). An ECG records your heartbeat patterns over a 24-hour period. You may also have other tests, such as:  · Transthoracic echocardiogram (TTE). During echocardiography, sound waves are used to evaluate how blood flows through your heart.  · Transesophageal echocardiogram (CÉSAR).  · Stress test. There is more than one type of stress test. If a stress test is needed, ask your health care provider about which type is best for you.  · Chest X-ray exam.  · Blood tests.  · Computed tomography (CT).  TREATMENT   Treatment may include:  · Treating any underlying conditions. For example, if you have an overactive thyroid, treating the condition may correct atrial fibrillation.  · Taking medicine. Medicines may be given to control a rapid heart rate or to prevent blood clots, heart failure, or a stroke.  · Having a procedure to correct the rhythm of the heart:  ¨ Electrical cardioversion. During electrical cardioversion, a controlled, low-energy shock is delivered to the heart through your skin. If you have chest pain, very low blood pressure, or sudden heart failure, this procedure may need to be done as an emergency.  ¨ Catheter ablation.  During this procedure, heart tissues that send the signals that cause atrial fibrillation are destroyed.  ¨ Surgical ablation. During this surgery, thin lines of heart tissue that carry the abnormal signals are destroyed. This procedure can either be an open-heart surgery or a minimally invasive surgery. With the minimally invasive surgery, small cuts are made to access the heart instead of a large opening.  ¨ Pulmonary venous isolation. During this surgery, tissue around the veins that carry blood from the lungs (pulmonary veins) is destroyed. This tissue is thought to carry the abnormal signals.  HOME CARE INSTRUCTIONS   · Take medicines only as directed by your health care provider. Some medicines can make atrial fibrillation worse or recur.  · If blood thinners were prescribed by your health care provider, take them exactly as directed. Too much blood-thinning medicine can cause bleeding. If you take too little, you will not have the needed protection against stroke and other problems.  · Perform blood tests at home if directed by your health care provider. Perform blood tests exactly as directed.  · Quit smoking if you smoke.  · Do not drink alcohol.  · Do not drink caffeinated beverages such as coffee, soda, and some teas. You may drink decaffeinated coffee, soda, or tea.    · Maintain a healthy weight. Do not use diet pills unless your health care provider approves. They may make heart problems worse.    · Follow diet instructions as directed by your health care provider.  · Exercise regularly as directed by your health care provider.  · Keep all follow-up visits as directed by your health care provider. This is important.  PREVENTION   The following substances can cause atrial fibrillation to recur:   · Caffeinated beverages.  · Alcohol.  · Certain medicines, especially those used for breathing problems.  · Certain herbs and herbal medicines, such as those containing ephedra or ginseng.  · Illegal drugs,  such as cocaine and amphetamines.  Sometimes medicines are given to prevent atrial fibrillation from recurring. Proper treatment of any underlying condition is also important in helping prevent recurrence.   SEEK MEDICAL CARE IF:  · You notice a change in the rate, rhythm, or strength of your heartbeat.  · You suddenly begin urinating more frequently.  · You tire more easily when exerting yourself or exercising.  SEEK IMMEDIATE MEDICAL CARE IF:   · You have chest pain, abdominal pain, sweating, or weakness.  · You feel nauseous.  · You have shortness of breath.  · You suddenly have swollen feet and ankles.  · You feel dizzy.  · Your face or limbs feel numb or weak.  · You have a change in your vision or speech.  MAKE SURE YOU:   · Understand these instructions.  · Will watch your condition.  · Will get help right away if you are not doing well or get worse.     This information is not intended to replace advice given to you by your health care provider. Make sure you discuss any questions you have with your health care provider.     Document Released: 12/18/2006 Document Revised: 01/08/2016 Document Reviewed: 04/13/2016  [email or web address] Interactive Patient Education ©2016 [email or web address]

## 2018-02-09 NOTE — H&P
Hospital Medicine History and Physical    Date of Service  2/8/2018    Chief Complaint  Chief Complaint   Patient presents with   • Chest Pain   • Headache       History of Presenting Illness  53 y.o. male with a past medical history of chronic atrial fibrillation, type II diabetes, morbid obesity, hypertension, medical noncompliance recently discharged one week ago after patient was having foot discomfort postop from surgery after having a right great toe amputation. Apparently from wound culture showed positive Enterococcus faecalis as well as MRSA patient was sent home with oral Zyvox per infectious disease recommendation. Patient apparently was unable to  his medications, as a result there were reordered and patient was discharged on 2/5/18. Hence patient Presented 2/8/2018 today with complaints of chest pain that started about 12:30 PM today describes the pain as pressure-like sensation midsternal chest area, 3/10 in severity, non radiating, but did have associative nausea with this discomfort, and lasted several hours. At this present time, Patient denies fevers/chills, chest pain, shortness of breath or nausea/vommiting.           Primary Care Physician  Jerad Lomax M.D.    Consultants  Cardiology    Code Status  Code: Full code    Review of Systems  Review of Systems   Constitutional: Positive for malaise/fatigue. Negative for chills and fever.   HENT: Negative for congestion, hearing loss, sore throat and tinnitus.    Eyes: Negative for blurred vision, double vision, photophobia and pain.   Respiratory: Negative for cough, hemoptysis, sputum production, shortness of breath and stridor.    Cardiovascular: Positive for chest pain. Negative for palpitations, orthopnea, claudication and PND.   Gastrointestinal: Negative for blood in stool, constipation, heartburn, melena, nausea and vomiting.   Genitourinary: Negative for dysuria, frequency and urgency.   Musculoskeletal: Negative for back pain, myalgias  and neck pain.   Neurological: Positive for weakness. Negative for dizziness, tingling, tremors, sensory change, speech change and headaches.   Psychiatric/Behavioral: Negative for depression, memory loss and suicidal ideas. The patient is not nervous/anxious.           Past Medical History  Past Medical History:   Diagnosis Date   • A-fib (CMS-HCC)    • Chest pain    • Diabetes (CMS-HCC)    • Diabetic neuropathy (CMS-HCC)    • Hypercholesterolemia    • Hypertension    • Morbid obesity (CMS-HCC)    • Noncompliance    • Normal cardiac stress test        Surgical History  Past Surgical History:   Procedure Laterality Date   • TOE AMPUTATION Right 1/17/2018    Procedure: TOE AMPUTATION-1ST RAY;  Surgeon: Doug Delong M.D.;  Location: SURGERY [facility];  Service: Orthopedics   • TOE AMPUTATION Right 11/10/2017    Procedure: TOE AMPUTATION;  Surgeon: Osorio Leo M.D.;  Location: SURGERY [facility];  Service: Orthopedics       Medications  No current facility-administered medications on file prior to encounter.      Current Outpatient Prescriptions on File Prior to Encounter   Medication Sig Dispense Refill   • linezolid (ZYVOX) 600 MG Tab Take 1 Tab by mouth every 12 hours. 20 Tab 0   • rivaroxaban (XARELTO) 20 MG Tab tablet Take 1 Tab by mouth with dinner. 30 Tab 0   • metoprolol (LOPRESSOR) 50 MG Tab Take 1 Tab by mouth 2 Times a Day. 60 Tab 0   • nystatin (MYCOSTATIN) powder Apply to affected area bid 15 g 1   • famotidine (PEPCID) 20 MG Tab Take 20 mg by mouth 1 time daily as needed.     • aspirin EC 81 MG EC tablet Take 1 Tab by mouth every day. 30 Tab    • metformin (GLUCOPHAGE) 500 MG Tab Take 2 Tabs by mouth 2 times a day, with meals. 60 Tab 0   • pravastatin (PRAVACHOL) 20 MG Tab Take 1 Tab by mouth every evening. 30 Tab 11   • lisinopril (PRINIVIL, ZESTRIL) 40 MG tablet Take 1 Tab by mouth every day. 30 Tab        Family History  History reviewed. No pertinent family history.    Social  History  Social History   Substance Use Topics   • Smoking status: Never Smoker   • Smokeless tobacco: Never Used   • Alcohol use No       Allergies  Allergies   Allergen Reactions   • Daptomycin Rash     Body rash  RXN=2018     • Pcn [Penicillins] Hives and Rash      Rash & hives  Tolerating Ampicillin + Sulbactam 17  RXN=since a kid        Physical Exam  Laboratory   Hemodynamics  Temp (24hrs), Av.7 °C (98.1 °F), Min:36.4 °C (97.6 °F), Max:36.9 °C (98.4 °F)   Temperature: 36.4 °C (97.6 °F)  Pulse  Av.4  Min: 79  Max: 101 Heart Rate (Monitored): (!) 112  Blood Pressure: (!) 177/83, NIBP: (!) 162/86      Respiratory      Respiration: 16, Pulse Oximetry: 94 %             Physical Exam   Constitutional: He is oriented to person, place, and time. He appears well-developed and well-nourished. No distress.   HENT:   Head: Normocephalic and atraumatic.   Mouth/Throat: No oropharyngeal exudate.   Eyes: Conjunctivae are normal. Pupils are equal, round, and reactive to light. Right eye exhibits no discharge. No scleral icterus.   Neck: Neck supple. No JVD present. No thyromegaly present.   Cardiovascular: Intact distal pulses.    No murmur heard.  Irregularly irregular.     Pulmonary/Chest: Effort normal and breath sounds normal. No stridor. No respiratory distress. He has no wheezes. He has no rales.   Abdominal: Soft. Bowel sounds are normal. He exhibits no distension. There is no tenderness. There is no rebound.   Musculoskeletal: Normal range of motion. He exhibits no edema.   Neurological: He is alert and oriented to person, place, and time. No cranial nerve deficit.   Skin: Skin is warm. No rash noted. He is not diaphoretic. No erythema.   Psychiatric: He has a normal mood and affect. His behavior is normal. Thought content normal.         Assessment/Plan  Diabetic foot infection (CMS-HCC)- (present on admission)   Assessment & Plan    Medically non compliant.  Will resume zyvox. Per last ID note, he  was getting Zyvox in the hospital but then was to receive daptomycin infusions,   At this point we'll continue his oral Zyvox instead.        Chest pain- (present on admission)   Assessment & Plan    Rule out cardiac cause.   Patient just had an echocardiogram, and recent stress test 1 month ago which were negative.  Discuss case with cardiology who has nothing further to offer, we discussed possibility of cardiac cath which was not recommended.  Will continue with aspirin, statin for cardiac protective measures.   Trend serial troponins q4 hrs.              Gangrene of toe of right foot, s/p recent amputation (CMS-HCC)- (present on admission)   Assessment & Plan    Wound looks good, no discharge, no erythema.   Continue wound care.        Type 2 diabetes mellitus (CMS-HCC)- (present on admission)   Assessment & Plan    Uncontrolled. Last a1c: 6.8  Hold metformin.  Continue Insulin-sliding scale, accu-checks and hypoglycemia protocol.            Chronic atrial fibrillation (CMS-HCC)- (present on admission)   Assessment & Plan    Rate controlled.   Continue with metoprolol, and xarelto.        HTN (hypertension)- (present on admission)   Assessment & Plan    Controlled  Continue norvasc, lisinopril, metoprolol.         HLD (hyperlipidemia)- (present on admission)   Assessment & Plan    Continue pravastatin.        GERD (gastroesophageal reflux disease)- (present on admission)   Assessment & Plan    Continue pepcid.            I anticipate this patient is appropriate for observation status at this time.    Prophylaxis: rivaroxaban    Recent Labs      02/08/18   1400   WBC  8.1   RBC  4.48*   HEMOGLOBIN  12.9*   HEMATOCRIT  38.5*   MCV  85.9   MCH  28.8   MCHC  33.5*   RDW  52.2*   PLATELETCT  277   MPV  10.1     Recent Labs      02/08/18   1400   SODIUM  138   POTASSIUM  4.0   CHLORIDE  105   CO2  21   GLUCOSE  132*   BUN  13   CREATININE  0.71   CALCIUM  9.3     Recent Labs      02/08/18   1400   ALTSGPT  8   ASTSGOT   13   ALKPHOSPHAT  53   TBILIRUBIN  0.5   LIPASE  10*   GLUCOSE  132*     Recent Labs      02/08/18   1400   APTT  42.8*   INR  2.29*     Recent Labs      02/08/18   1400   BNPBTYPENAT  150*         Lab Results   Component Value Date    TROPONINI <0.01 02/08/2018       Imaging  DX-CHEST-PORTABLE (1 VIEW)   Final Result         1. Stable cardiomegaly No pulmonary infiltrates or consolidations are noted.

## 2018-02-09 NOTE — DISCHARGE PLANNING
Called [facility] about authorization for Zyvox for patient.  Aracelis at [facility] ([phone]) stated that patient should be able to get medication because current authorization does not  until 2018.

## 2018-02-09 NOTE — ASSESSMENT & PLAN NOTE
Rule out cardiac cause.   Patient just had an echocardiogram, and recent stress test 1 month ago which were negative.  Discuss case with cardiology who has nothing further to offer, we discussed possibility of cardiac cath which was not recommended.  Will continue with aspirin, statin for cardiac protective measures.   Trend serial troponins q4 hrs.

## 2018-02-09 NOTE — PROGRESS NOTES
IV D/C'd.  Discharge instructions provided to pt.  Pt states understanding.  Pt states all questions have been answered.  Copy of discharge provided to pt.  Signed copy in chart.  Prescriptions provided to pt, copy in chart. Pt states that all personal belongings are in possession.   Pt escorted off unit without incident.

## 2018-02-09 NOTE — ASSESSMENT & PLAN NOTE
Uncontrolled. Last a1c: 6.8  Hold metformin.  Continue Insulin-sliding scale, accu-checks and hypoglycemia protocol.

## 2018-02-09 NOTE — ASSESSMENT & PLAN NOTE
Medically non compliant.  Will resume zyvox. Per last ID note, he was getting Zyvox in the hospital but then was to receive daptomycin infusions,   At this point we'll continue his oral Zyvox instead.

## 2018-02-09 NOTE — PROGRESS NOTES
Dressing to rt foot removed, obtained and loaded pictures to [facility], applied clean drsg, pt tolerated without difficulty, denies pain, needs at this time, will monitor.

## 2018-02-10 NOTE — DISCHARGE SUMMARY
CHIEF COMPLAINT ON ADMISSION  Chief Complaint   Patient presents with   • Chest Pain   • Headache       CODE STATUS  Prior    HPI & HOSPITAL COURSE  This is a 53 y.o. male with past medical history significant for atrial fibrillation, diabetes with recent right great toe amputation due to diabetic infection and medical noncompliance here with chest pain and headache. She was recently discharged about one week ago after having foot discomfort postop from surgery for right great toe amputation. Wound culture at that time was positive for enterococcus for Cialis as well as MRSA and the patient was sent home with oral Zyvox per infectious disease recommendation. There is some conflicting information regarding the Zyvox as patient reported to admitting physician that he was not able to  his Zyvox and they were reordered and he was discharged on 2/5/18. Patient states however that he picked up his prescription on February 2 and completed the entire course on the 7th. His pharmacy, however reports that the Zyvox from the previous prescription is still sitting on the shelf as he never picked it up. Patient also reports that he was told yesterday by MILLICENT Martinez, that he could stop taking his Zyvox because his infection was looking so much better. Per her note however she reports patient is supposed to be taking through 2/16/18 and she discussed this with him yesterday.  Patient presented yesterday with complaints of chest pain which she described as pressure-like sensation located in the midsternal chest area radiating to 3 out of 10 in severity without radiation. Admission blood pressure was running in 160s systolic. Troponin was less than 0.01×3 with BNP of 150. Audible chest x-ray showed no pulmonary infiltrates or consolidations, no pleural effusion and no pneumothorax. Twelve-lead EKG showed atrial fibrillation rate of 105 with PVC. Cardiology, Dr. Fernandez, was consulted. Patient recently had a cardiac  workup with a myocardial perfusion scan on 1/28/18 (9 days ago) which was normal. He had an echocardiogram on 27 January that revealed an ejection fraction 60%, mild LVH and no significant valvular abnormalities. Cardiology does not recommend any coronary angiography at this time and added amlodipine 2 assist with better blood pressure control. Blood pressure this morning is 136/64 and he denies any chest pain, shortness of breath, dizziness, nausea, vomiting, or palpitations. He is ambulating independently without any issues and tolerating a diet. His Zyvox is again reordered for another day course to be completed 2/16/18. He is strongly urged to  the prescription at [facility] and returned to the emergency department or call 911 with any further emergencies.    Therefore, he is discharged in good and stable condition with close outpatient follow-up.    DISCHARGE PROBLEM LIST  Active Problems:    Gangrene of toe of right foot, s/p recent amputation (CMS-[facility]) POA: Yes    Chest pain POA: Yes    Diabetic foot infection (CMS-[facility]) POA: Yes    Type 2 diabetes mellitus (CMS-[facility]) POA: Yes    GERD (gastroesophageal reflux disease) POA: Yes    HLD (hyperlipidemia) POA: Yes    HTN (hypertension) POA: Yes    Chronic atrial fibrillation (CMS-[facility]) POA: Yes  Resolved Problems:    * No resolved hospital problems. *      FOLLOW UP  Future Appointments  Date Time Provider Department Center   2/11/2018 To Be Determined Kailey Butts, OT [facility] None   2/11/2018 To Be Determined Nelli Foster, PT [facility] None   2/12/2018 To Be Determined Freddie Quintanilla R.N. [facility] None   2/14/2018 9:30 AM Jerad Lomax M.D. [facility]   2/14/2018 To Be Determined Freddie Quintanilla R.N. [facility] None   2/16/2018 To Be Determined Freddie Quintanilla R.N. [facility] None   2/19/2018 To Be Determined Freddie Quintanilla R.N. [facility] None   2/21/2018 To Be Determined Freddie Quintanilla R.N. [facility] None   2/23/2018 To Be Determined Freddie Quintanilla R.N. [facility] None    2/26/2018 To Be Determined Freddie Quintanilla R.N. [facility] None   2/28/2018 To Be Determined Freddie Quintanilla R.N. [facility] None   3/2/2018 To Be Determined Freddie Quintanilla R.N. [facility] None   3/5/2018 To Be Determined Freddie Quintanilla R.N. [facility] None     MEDICATIONS ON DISCHARGE     Medication List      START taking these medications      Instructions   amLODIPine 5 MG Tabs  Start taking on:  2/10/2018  Commonly known as:  NORVASC   Take 1 Tab by mouth every day.  Dose:  5 mg        CHANGE how you take these medications      Instructions   Metoprolol Tartrate 75 MG Tabs  What changed:  · medication strength  · how much to take   Take 75 mg by mouth 2 Times a Day.  Dose:  75 mg        CONTINUE taking these medications      Instructions   aspirin 81 MG EC tablet   Take 1 Tab by mouth every day.  Dose:  81 mg     digoxin 250 MCG Tabs  Commonly known as:  LANOXIN   Take 250 mcg by mouth every day.  Dose:  250 mcg     famotidine 20 MG Tabs  Commonly known as:  PEPCID   Take 20 mg by mouth 1 time daily as needed.  Dose:  20 mg     linezolid 600 MG Tabs  Commonly known as:  ZYVOX   Take 1 Tab by mouth every 12 hours for 7 days.  Dose:  600 mg     lisinopril 40 MG tablet  Commonly known as:  PRINIVIL, ZESTRIL   Take 1 Tab by mouth every day.  Dose:  40 mg     metFORMIN 500 MG Tabs  Commonly known as:  GLUCOPHAGE   Take 2 Tabs by mouth 2 times a day, with meals.  Dose:  1000 mg     nystatin powder  Commonly known as:  MYCOSTATIN   Apply to affected area bid     pravastatin 20 MG Tabs  Commonly known as:  PRAVACHOL   Take 1 Tab by mouth every evening.  Dose:  20 mg     rivaroxaban 20 MG Tabs tablet  Commonly known as:  XARELTO   Take 1 Tab by mouth with dinner.  Dose:  20 mg          DIET  Diabetic Diet    ACTIVITY  As tolerated.  Weight bearing as tolerated    CONSULTATIONS  - Cardiology    PROCEDURES  NA    LABORATORY  Lab Results   Component Value Date/Time    SODIUM 135 02/09/2018 12:37 AM    POTASSIUM 3.9 02/09/2018 12:37 AM     CHLORIDE 101 02/09/2018 12:37 AM    CO2 28 02/09/2018 12:37 AM    GLUCOSE 132 (H) 02/09/2018 12:37 AM    BUN 10 02/09/2018 12:37 AM    CREATININE 0.63 02/09/2018 12:37 AM        Lab Results   Component Value Date/Time    WBC 8.2 02/09/2018 12:37 AM    HEMOGLOBIN 11.5 (L) 02/09/2018 12:37 AM    HEMATOCRIT 36.1 (L) 02/09/2018 12:37 AM    PLATELETCT 289 02/09/2018 12:37 AM        Total time of the discharge process 38 minutes   CRYSTAL Ruano.

## 2018-02-12 ENCOUNTER — HOME CARE VISIT (OUTPATIENT)
Dept: HOME HEALTH SERVICES | Facility: HOME HEALTHCARE | Age: 53
End: 2018-02-12
Payer: MEDICAID

## 2018-02-12 VITALS
RESPIRATION RATE: 18 BRPM | TEMPERATURE: 99.1 F | HEART RATE: 66 BPM | DIASTOLIC BLOOD PRESSURE: 80 MMHG | OXYGEN SATURATION: 94 % | SYSTOLIC BLOOD PRESSURE: 120 MMHG

## 2018-02-12 PROCEDURE — G0299 HHS/HOSPICE OF RN EA 15 MIN: HCPCS

## 2018-02-12 PROCEDURE — G0152 HHCP-SERV OF OT,EA 15 MIN: HCPCS

## 2018-02-12 ASSESSMENT — ENCOUNTER SYMPTOMS
VOMITING: DENIES
NAUSEA: DENIES

## 2018-02-13 VITALS
TEMPERATURE: 96.7 F | SYSTOLIC BLOOD PRESSURE: 125 MMHG | RESPIRATION RATE: 18 BRPM | DIASTOLIC BLOOD PRESSURE: 65 MMHG | HEART RATE: 64 BPM | OXYGEN SATURATION: 99 %

## 2018-02-13 ASSESSMENT — ACTIVITIES OF DAILY LIVING (ADL)
MEAL_PREP_ASSISTANCE: 0
ORAL_CARE_ASSISTANCE: 0
SHOPPING_ASSISTANCE: 0
EATING_ASSISTANCE: 0
HOUSEKEEPING_ASSISTANCE: 0
TRANSPORTATION_ASSISTANCE: 6
BATHING_ASSISTANCE: 0
TELEPHONE_ASSISTANCE: 0
LAUNDRY_ASSISTANCE: 0
DRESSING_UB_ASSISTANCE: 0
DRESSING_LB_ASSISTANCE: 0
GROOMING_ASSISTANCE: 0
TOILETING_ASSISTANCE: 0

## 2018-02-14 ENCOUNTER — HOME CARE VISIT (OUTPATIENT)
Dept: HOME HEALTH SERVICES | Facility: HOME HEALTHCARE | Age: 53
End: 2018-02-14
Payer: MEDICAID

## 2018-02-14 ENCOUNTER — OFFICE VISIT (OUTPATIENT)
Dept: MEDICAL GROUP | Facility: MEDICAL CENTER | Age: 53
End: 2018-02-14
Attending: FAMILY MEDICINE
Payer: MEDICAID

## 2018-02-14 VITALS
SYSTOLIC BLOOD PRESSURE: 125 MMHG | DIASTOLIC BLOOD PRESSURE: 80 MMHG | WEIGHT: 295 LBS | HEIGHT: 72 IN | RESPIRATION RATE: 16 BRPM | OXYGEN SATURATION: 97 % | HEART RATE: 88 BPM | BODY MASS INDEX: 39.96 KG/M2 | TEMPERATURE: 97.3 F

## 2018-02-14 DIAGNOSIS — E11.8 DM (DIABETES MELLITUS) WITH COMPLICATIONS (HCC): ICD-10-CM

## 2018-02-14 DIAGNOSIS — I48.19 PERSISTENT ATRIAL FIBRILLATION (HCC): ICD-10-CM

## 2018-02-14 DIAGNOSIS — I10 ESSENTIAL HYPERTENSION: ICD-10-CM

## 2018-02-14 DIAGNOSIS — K21.9 GASTROESOPHAGEAL REFLUX DISEASE, ESOPHAGITIS PRESENCE NOT SPECIFIED: ICD-10-CM

## 2018-02-14 DIAGNOSIS — I96 GANGRENE OF TOE OF RIGHT FOOT (HCC): ICD-10-CM

## 2018-02-14 DIAGNOSIS — Z12.11 COLON CANCER SCREENING: ICD-10-CM

## 2018-02-14 LAB
FUNGUS SPEC CULT: ABNORMAL
SIGNIFICANT IND 70042: ABNORMAL
SITE SITE: ABNORMAL
SOURCE SOURCE: ABNORMAL

## 2018-02-14 PROCEDURE — G0180 MD CERTIFICATION HHA PATIENT: HCPCS | Performed by: FAMILY MEDICINE

## 2018-02-14 PROCEDURE — G0151 HHCP-SERV OF PT,EA 15 MIN: HCPCS

## 2018-02-14 PROCEDURE — G0299 HHS/HOSPICE OF RN EA 15 MIN: HCPCS

## 2018-02-14 PROCEDURE — 99214 OFFICE O/P EST MOD 30 MIN: CPT | Performed by: FAMILY MEDICINE

## 2018-02-14 PROCEDURE — 99203 OFFICE O/P NEW LOW 30 MIN: CPT | Performed by: FAMILY MEDICINE

## 2018-02-14 ASSESSMENT — ENCOUNTER SYMPTOMS
NECK PAIN: 0
TREMORS: 0
DOUBLE VISION: 0
PALPITATIONS: 1
SPUTUM PRODUCTION: 0
PHOTOPHOBIA: 0
BLURRED VISION: 0
CLAUDICATION: 0
PSYCHIATRIC NEGATIVE: 1
FOCAL WEAKNESS: 1
VOMITING: 0
HEARTBURN: 0
EYE PAIN: 0
HEADACHES: 0
SPEECH CHANGE: 0
COUGH: 0
SENSORY CHANGE: 0
NAUSEA: 0
ABDOMINAL PAIN: 0
BACK PAIN: 0
CHILLS: 0
TINGLING: 1
ORTHOPNEA: 0
FEVER: 0
SHORTNESS OF BREATH: 0

## 2018-02-14 ASSESSMENT — PATIENT HEALTH QUESTIONNAIRE - PHQ9: CLINICAL INTERPRETATION OF PHQ2 SCORE: 0

## 2018-02-14 NOTE — PROGRESS NOTES
Subjective:      Sebastián Castro is a 53 y.o. male who presents with Hospital Follow-up (a fib)            Patient 53-year-old male here to establish the clinic today. Had recently been hospitalized for atrial fibrillation. He is currently on xarelto, norvasc, metoprolol, digoxin, lisinopril and ASA. He had been seen by a cardiologist in the hospital but has not been seen since he is discharged. A referral to cardiology will also be made for additional assistance in managing his atrial fibrillation. Patient states that his atrial fibrillation is still persistent following discharge from the hospital. He has not had any chest pain, shortness of breath or syncopal episode. Patient has been advised if he does have any of these episodes or any worsening of his current symptoms to proceed back to the hospital for additional assistance in management.  He has not noticed any bleeding or bruising secondary to his blood thinning medications.    He had also recently had his right toe amputated. The amputation developed a secondary infection for which he currently on IV antibiotic (Zyvox). States he has been doing well and had been going to the wound care clinic until he has been discharged. He does have home health nurses coming out to visit him as well as check on his surgical wound.  He is able to ambulate with a walker. Walker he currently has has only 2 wheels and he is having trouble managing it. Will order a four wheeled walker with a seat so patient can sit if he gets fatigued. We'll continue to follow.    His past medical history includes diabetes, hyperlipidemia, gastroesophageal reflux disease and hypertension.  Will have him continue to use his medications as previously directed. Will reorder blood work checking a thyroid function, metabolic function, cholesterol level, complete blood count and a vitamin D level.  Will also check a hemoglobin A1c as well as microalbumin creatinine ratio. Will have patient return to  clinic for a diabetic retinal scan. We'll also have him continue to check his feet daily for any skin breakdown or further changes in sensation. We'll continue to follow.            Review of Systems   Constitutional: Negative for chills and fever.   HENT: Negative for hearing loss and tinnitus.    Eyes: Negative for blurred vision, double vision, photophobia and pain.   Respiratory: Negative for cough, sputum production and shortness of breath.    Cardiovascular: Positive for palpitations. Negative for chest pain, orthopnea, claudication and leg swelling.   Gastrointestinal: Negative for abdominal pain, heartburn, nausea and vomiting.   Genitourinary: Negative.    Musculoskeletal: Negative for back pain, joint pain and neck pain.   Skin: Negative for rash.   Neurological: Positive for tingling and focal weakness. Negative for tremors, sensory change, speech change and headaches.   Endo/Heme/Allergies: Negative.    Psychiatric/Behavioral: Negative.           Objective:     /80   Pulse 88   Temp 36.3 °C (97.3 °F)   Resp 16   Ht 1.829 m (6')   Wt (!) 133.8 kg (295 lb)   SpO2 97%   BMI 40.01 kg/m²      Physical Exam   Constitutional: He is oriented to person, place, and time.   BMI > 40, unkept appearance   HENT:   Head: Normocephalic and atraumatic.   Nose: Nose normal.   Mouth/Throat: Oropharynx is clear and moist.   Eyes: Conjunctivae and EOM are normal. Pupils are equal, round, and reactive to light.   Neck: Normal range of motion. Neck supple. No thyromegaly present.   Cardiovascular: Normal rate.  Exam reveals no friction rub.    No murmur heard.  irreg rhythm   Pulmonary/Chest: Effort normal and breath sounds normal. No respiratory distress. He has no wheezes. He has no rales.   Abdominal: Soft. Bowel sounds are normal. He exhibits no distension. There is no tenderness.   Musculoskeletal:   Using a walker to ambulate, foot appears to be healing well on R   Lymphadenopathy:     He has no cervical  adenopathy.   Neurological: He is alert and oriented to person, place, and time.   Skin: Skin is warm and dry.   Psychiatric: He has a normal mood and affect. His behavior is normal.   Nursing note and vitals reviewed.              Assessment/Plan:     1. Persistent atrial fibrillation (CMS-HCC)  Will have patient continue to take his medication as directed by the cardiologist in hospital. A referral to cardiologist will be made for outpatient management. ER precautions were given to patient if he should have any sudden worsening of his current symptoms.  - REFERRAL TO CARDIOLOGY    2. BMI 40.0-44.9, adult (CMS-HCC)  Discussed diet and exercise to lower his BMI.  - Patient identified as having weight management issue.  Appropriate orders and counseling given.    3. Gangrene of toe of right foot, s/p recent amputation (CMS-HCC)  He'll continue to take his Zyvox as directed. He will also continue to have home health visit for wound checks as well as welfare visits.    4. DM (diabetes mellitus) with complications (CMS-HCC)  He will continue to take his medications as directed. An A1c, microalbumin creatinine ratio and metabolic panel have been ordered. We'll also do a diabetic retinal exam at his next visit. We'll continue to follow.  - TSH WITH REFLEX TO FT4; Future  - COMP METABOLIC PANEL; Future  - LIPID PROFILE; Future  - HEMOGLOBIN A1C; Future  - MICROALBUMIN CREAT RATIO URINE; Future  - VITAMIN D,25 HYDROXY; Future    5. Gastroesophageal reflux disease, esophagitis presence not specified  Will have patient continues to take his medication as directed. We'll continue to follow.    6. Essential hypertension  Will have him continue to take his medication as directed. He has been advised to monitor blood pressure at home and keep notes. If blood pressure elevated or having symptoms of CP, SOB or neurologic changes to go to the er.    - LIPID PROFILE; Future    7. Colon cancer screening  A FIT has been ordered, will  refer to GI for colonoscopy if positive.  - OCCULT BLOOD FECES IMMUNOASSAY; Future

## 2018-02-15 VITALS
TEMPERATURE: 98.7 F | HEART RATE: 76 BPM | RESPIRATION RATE: 18 BRPM | DIASTOLIC BLOOD PRESSURE: 70 MMHG | OXYGEN SATURATION: 94 % | SYSTOLIC BLOOD PRESSURE: 118 MMHG

## 2018-02-15 SDOH — ECONOMIC STABILITY: HOUSING INSECURITY: UNSAFE COOKING RANGE AREA: 0

## 2018-02-15 SDOH — ECONOMIC STABILITY: HOUSING INSECURITY
HOME SAFETY: PT LIVES IN SINGLE LEVEL HOME WITH 2 ROOMMATES, 2 DOGS AND ONE CAT, CLEAR BUT NARROW PATHWAYS THROUGH HOME. HAS 3-4 STEPS

## 2018-02-15 SDOH — ECONOMIC STABILITY: HOUSING INSECURITY: UNSAFE APPLIANCES: 0

## 2018-02-15 ASSESSMENT — ACTIVITIES OF DAILY LIVING (ADL)
TELEPHONE_ASSISTANCE: 0
LAUNDRY_ASSISTANCE: 1
HOUSEKEEPING_ASSISTANCE: 1
EATING_ASSISTANCE: 0
ORAL_CARE_ASSISTANCE: 0
IADLS_COMMENTS: <!--EPICS-->SEE OT EVAL FOR MORE DETAILS. <!--EPICE-->
DRESSING_LB_ASSISTANCE: 0
DRESSING_UB_ASSISTANCE: 0
SHOPPING_ASSISTANCE: 4
LAUNDRY_ASSISTANCE: 0
MEAL_PREP_ASSISTANCE: 0
ADLS_COMMENTS: <!--EPICS-->SEE OT EVAL FOR MORE DETAILS<!--EPICE-->
SHOPPING_ASSISTANCE: 5
TOILETING_ASSISTANCE: 0
HOUSEKEEPING_ASSISTANCE: 0
BATHING_ASSISTANCE: 0
GROOMING_ASSISTANCE: 0

## 2018-02-15 ASSESSMENT — GAIT ASSESSMENTS
TIME: 0
RIGHT STEP PASS: 1
BALANCE AND GAIT SCORE: 17
INITIATION OF GAIT IMMEDIATELY AFTER GO: 1
PATH: 1
STEP CONTINUITY: 1
LEFT STEP CLEAR: 1
STEP SYMMETRY: 1
SURVEY COMPLETE: TRUE
GAIT SCORE: 9
LEFT STEP PASS: 1
RIGHT STEP CLEAR: 1
TRUNK: 1

## 2018-02-15 ASSESSMENT — BALANCE ASSESSMENTS
EYES CLOSED AT MAXIMUM POSITION NUDGED: 1
BALANCE SCORE: 8
NUDGED: 1
STANDING BALANCE: 1
IMMEDIATE STANDING BALANCE FIRST 5 SECONDS: 1
SURVEY COMPLETE: TRUE
SITTING DOWN: 1
TURNING 360 DEGREES STEPS: 0
SITTING BALANCE: 1
TURNING 360 DEGREES STEADINESS: 0
ATTEMPTS TO ARISE: 1
ARISES: 1

## 2018-02-16 ENCOUNTER — HOME CARE VISIT (OUTPATIENT)
Dept: HOME HEALTH SERVICES | Facility: HOME HEALTHCARE | Age: 53
End: 2018-02-16
Payer: MEDICAID

## 2018-02-16 VITALS
HEART RATE: 74 BPM | TEMPERATURE: 97.9 F | SYSTOLIC BLOOD PRESSURE: 120 MMHG | OXYGEN SATURATION: 98 % | DIASTOLIC BLOOD PRESSURE: 80 MMHG | RESPIRATION RATE: 18 BRPM

## 2018-02-16 PROCEDURE — G0151 HHCP-SERV OF PT,EA 15 MIN: HCPCS

## 2018-02-16 ASSESSMENT — ENCOUNTER SYMPTOMS
NAUSEA: DENIES
VOMITING: DENIES

## 2018-02-17 ENCOUNTER — HOME CARE VISIT (OUTPATIENT)
Dept: HOME HEALTH SERVICES | Facility: HOME HEALTHCARE | Age: 53
End: 2018-02-17
Payer: MEDICAID

## 2018-02-17 ENCOUNTER — RESOLUTE PROFESSIONAL BILLING HOSPITAL PROF FEE (OUTPATIENT)
Dept: HOSPITALIST | Facility: MEDICAL CENTER | Age: 53
End: 2018-02-17
Payer: MEDICAID

## 2018-02-17 ENCOUNTER — APPOINTMENT (OUTPATIENT)
Dept: RADIOLOGY | Facility: MEDICAL CENTER | Age: 53
DRG: 501 | End: 2018-02-17
Attending: EMERGENCY MEDICINE
Payer: MEDICAID

## 2018-02-17 ENCOUNTER — HOSPITAL ENCOUNTER (INPATIENT)
Facility: MEDICAL CENTER | Age: 53
LOS: 9 days | DRG: 501 | End: 2018-02-26
Attending: EMERGENCY MEDICINE | Admitting: HOSPITALIST
Payer: MEDICAID

## 2018-02-17 DIAGNOSIS — L97.519 DIABETIC ULCER OF RIGHT FOOT ASSOCIATED WITH OTHER SPECIFIED DIABETES MELLITUS, UNSPECIFIED PART OF FOOT, UNSPECIFIED ULCER STAGE (HCC): ICD-10-CM

## 2018-02-17 DIAGNOSIS — E11.621 TYPE 2 DIABETES MELLITUS WITH FOOT ULCER, WITHOUT LONG-TERM CURRENT USE OF INSULIN (HCC): ICD-10-CM

## 2018-02-17 DIAGNOSIS — E13.621 DIABETIC ULCER OF RIGHT FOOT ASSOCIATED WITH OTHER SPECIFIED DIABETES MELLITUS, UNSPECIFIED PART OF FOOT, UNSPECIFIED ULCER STAGE (HCC): ICD-10-CM

## 2018-02-17 DIAGNOSIS — S98.111A AMPUTATED GREAT TOE OF RIGHT FOOT (HCC): ICD-10-CM

## 2018-02-17 DIAGNOSIS — E11.8 DIABETIC FOOT (HCC): Primary | ICD-10-CM

## 2018-02-17 DIAGNOSIS — L97.509 TYPE 2 DIABETES MELLITUS WITH FOOT ULCER, WITHOUT LONG-TERM CURRENT USE OF INSULIN (HCC): ICD-10-CM

## 2018-02-17 LAB
ANION GAP SERPL CALC-SCNC: 10 MMOL/L (ref 0–11.9)
BASOPHILS # BLD AUTO: 0.4 % (ref 0–1.8)
BASOPHILS # BLD: 0.05 K/UL (ref 0–0.12)
BUN SERPL-MCNC: 18 MG/DL (ref 8–22)
CALCIUM SERPL-MCNC: 9.4 MG/DL (ref 8.5–10.5)
CHLORIDE SERPL-SCNC: 101 MMOL/L (ref 96–112)
CO2 SERPL-SCNC: 26 MMOL/L (ref 20–33)
CREAT SERPL-MCNC: 0.65 MG/DL (ref 0.5–1.4)
CRP SERPL HS-MCNC: 0.88 MG/DL (ref 0–0.75)
EOSINOPHIL # BLD AUTO: 0.62 K/UL (ref 0–0.51)
EOSINOPHIL NFR BLD: 5.5 % (ref 0–6.9)
ERYTHROCYTE [DISTWIDTH] IN BLOOD BY AUTOMATED COUNT: 51.1 FL (ref 35.9–50)
GLUCOSE SERPL-MCNC: 160 MG/DL (ref 65–99)
HCT VFR BLD AUTO: 40.1 % (ref 42–52)
HGB BLD-MCNC: 13.3 G/DL (ref 14–18)
IMM GRANULOCYTES # BLD AUTO: 0.04 K/UL (ref 0–0.11)
IMM GRANULOCYTES NFR BLD AUTO: 0.4 % (ref 0–0.9)
LYMPHOCYTES # BLD AUTO: 3.4 K/UL (ref 1–4.8)
LYMPHOCYTES NFR BLD: 30 % (ref 22–41)
MCH RBC QN AUTO: 28.4 PG (ref 27–33)
MCHC RBC AUTO-ENTMCNC: 33.2 G/DL (ref 33.7–35.3)
MCV RBC AUTO: 85.5 FL (ref 81.4–97.8)
MONOCYTES # BLD AUTO: 0.74 K/UL (ref 0–0.85)
MONOCYTES NFR BLD AUTO: 6.5 % (ref 0–13.4)
NEUTROPHILS # BLD AUTO: 6.5 K/UL (ref 1.82–7.42)
NEUTROPHILS NFR BLD: 57.2 % (ref 44–72)
NRBC # BLD AUTO: 0 K/UL
NRBC BLD-RTO: 0 /100 WBC
PLATELET # BLD AUTO: 297 K/UL (ref 164–446)
PMV BLD AUTO: 10.1 FL (ref 9–12.9)
POTASSIUM SERPL-SCNC: 3.8 MMOL/L (ref 3.6–5.5)
RBC # BLD AUTO: 4.69 M/UL (ref 4.7–6.1)
SODIUM SERPL-SCNC: 137 MMOL/L (ref 135–145)
WBC # BLD AUTO: 11.4 K/UL (ref 4.8–10.8)

## 2018-02-17 PROCEDURE — 770006 HCHG ROOM/CARE - MED/SURG/GYN SEMI*

## 2018-02-17 PROCEDURE — 87077 CULTURE AEROBIC IDENTIFY: CPT | Mod: 91

## 2018-02-17 PROCEDURE — 87205 SMEAR GRAM STAIN: CPT

## 2018-02-17 PROCEDURE — 80048 BASIC METABOLIC PNL TOTAL CA: CPT

## 2018-02-17 PROCEDURE — 86140 C-REACTIVE PROTEIN: CPT

## 2018-02-17 PROCEDURE — 99285 EMERGENCY DEPT VISIT HI MDM: CPT

## 2018-02-17 PROCEDURE — 87040 BLOOD CULTURE FOR BACTERIA: CPT

## 2018-02-17 PROCEDURE — 85652 RBC SED RATE AUTOMATED: CPT

## 2018-02-17 PROCEDURE — 87186 SC STD MICRODIL/AGAR DIL: CPT | Mod: 91

## 2018-02-17 PROCEDURE — 96365 THER/PROPH/DIAG IV INF INIT: CPT

## 2018-02-17 PROCEDURE — 99223 1ST HOSP IP/OBS HIGH 75: CPT | Performed by: HOSPITALIST

## 2018-02-17 PROCEDURE — 87070 CULTURE OTHR SPECIMN AEROBIC: CPT

## 2018-02-17 PROCEDURE — 85025 COMPLETE CBC W/AUTO DIFF WBC: CPT

## 2018-02-17 PROCEDURE — 73630 X-RAY EXAM OF FOOT: CPT | Mod: RT

## 2018-02-17 PROCEDURE — 36415 COLL VENOUS BLD VENIPUNCTURE: CPT

## 2018-02-17 PROCEDURE — 700111 HCHG RX REV CODE 636 W/ 250 OVERRIDE (IP): Performed by: EMERGENCY MEDICINE

## 2018-02-17 RX ORDER — LINEZOLID 2 MG/ML
600 INJECTION, SOLUTION INTRAVENOUS ONCE
Status: COMPLETED | OUTPATIENT
Start: 2018-02-17 | End: 2018-02-18

## 2018-02-17 RX ORDER — LINEZOLID 600 MG/1
600 TABLET, FILM COATED ORAL 2 TIMES DAILY
Status: ON HOLD | COMMUNITY
Start: 2018-02-09 | End: 2018-02-22

## 2018-02-17 RX ADMIN — LINEZOLID 600 MG: 600 INJECTION, SOLUTION INTRAVENOUS at 23:33

## 2018-02-17 ASSESSMENT — LIFESTYLE VARIABLES: DO YOU DRINK ALCOHOL: NO

## 2018-02-18 ENCOUNTER — APPOINTMENT (OUTPATIENT)
Dept: RADIOLOGY | Facility: MEDICAL CENTER | Age: 53
DRG: 501 | End: 2018-02-18
Attending: HOSPITALIST
Payer: MEDICAID

## 2018-02-18 VITALS
DIASTOLIC BLOOD PRESSURE: 70 MMHG | TEMPERATURE: 98.4 F | HEART RATE: 68 BPM | RESPIRATION RATE: 18 BRPM | OXYGEN SATURATION: 98 % | SYSTOLIC BLOOD PRESSURE: 124 MMHG

## 2018-02-18 LAB
APTT PPP: 46.8 SEC (ref 24.7–36)
APTT PPP: 70 SEC (ref 24.7–36)
ERYTHROCYTE [SEDIMENTATION RATE] IN BLOOD BY WESTERGREN METHOD: 37 MM/HOUR (ref 0–20)
ERYTHROCYTE [SEDIMENTATION RATE] IN BLOOD BY WESTERGREN METHOD: 41 MM/HOUR (ref 0–20)
GLUCOSE BLD-MCNC: 126 MG/DL (ref 65–99)
GLUCOSE BLD-MCNC: 153 MG/DL (ref 65–99)
GLUCOSE BLD-MCNC: 183 MG/DL (ref 65–99)
GRAM STN SPEC: NORMAL
SIGNIFICANT IND 70042: NORMAL
SITE SITE: NORMAL
SOURCE SOURCE: NORMAL

## 2018-02-18 PROCEDURE — 700102 HCHG RX REV CODE 250 W/ 637 OVERRIDE(OP): Performed by: INTERNAL MEDICINE

## 2018-02-18 PROCEDURE — 700111 HCHG RX REV CODE 636 W/ 250 OVERRIDE (IP): Performed by: INTERNAL MEDICINE

## 2018-02-18 PROCEDURE — A9579 GAD-BASE MR CONTRAST NOS,1ML: HCPCS | Performed by: INTERNAL MEDICINE

## 2018-02-18 PROCEDURE — 73720 MRI LWR EXTREMITY W/O&W/DYE: CPT | Mod: RT

## 2018-02-18 PROCEDURE — 700105 HCHG RX REV CODE 258

## 2018-02-18 PROCEDURE — A9270 NON-COVERED ITEM OR SERVICE: HCPCS | Performed by: HOSPITALIST

## 2018-02-18 PROCEDURE — 700111 HCHG RX REV CODE 636 W/ 250 OVERRIDE (IP): Performed by: HOSPITALIST

## 2018-02-18 PROCEDURE — 82962 GLUCOSE BLOOD TEST: CPT | Mod: 91

## 2018-02-18 PROCEDURE — A9270 NON-COVERED ITEM OR SERVICE: HCPCS | Performed by: INTERNAL MEDICINE

## 2018-02-18 PROCEDURE — 85652 RBC SED RATE AUTOMATED: CPT

## 2018-02-18 PROCEDURE — 770006 HCHG ROOM/CARE - MED/SURG/GYN SEMI*

## 2018-02-18 PROCEDURE — 85730 THROMBOPLASTIN TIME PARTIAL: CPT | Mod: 91

## 2018-02-18 PROCEDURE — 99232 SBSQ HOSP IP/OBS MODERATE 35: CPT | Performed by: INTERNAL MEDICINE

## 2018-02-18 PROCEDURE — 36415 COLL VENOUS BLD VENIPUNCTURE: CPT

## 2018-02-18 PROCEDURE — 700105 HCHG RX REV CODE 258: Performed by: INTERNAL MEDICINE

## 2018-02-18 PROCEDURE — 700102 HCHG RX REV CODE 250 W/ 637 OVERRIDE(OP): Performed by: HOSPITALIST

## 2018-02-18 PROCEDURE — 700105 HCHG RX REV CODE 258: Performed by: HOSPITALIST

## 2018-02-18 PROCEDURE — 700117 HCHG RX CONTRAST REV CODE 255: Performed by: INTERNAL MEDICINE

## 2018-02-18 RX ORDER — AMOXICILLIN 250 MG
2 CAPSULE ORAL 2 TIMES DAILY
Status: DISCONTINUED | OUTPATIENT
Start: 2018-02-18 | End: 2018-02-26 | Stop reason: HOSPADM

## 2018-02-18 RX ORDER — POLYETHYLENE GLYCOL 3350 17 G/17G
1 POWDER, FOR SOLUTION ORAL
Status: DISCONTINUED | OUTPATIENT
Start: 2018-02-18 | End: 2018-02-26 | Stop reason: HOSPADM

## 2018-02-18 RX ORDER — LINEZOLID 600 MG/1
600 TABLET, FILM COATED ORAL EVERY 12 HOURS
Status: DISCONTINUED | OUTPATIENT
Start: 2018-02-18 | End: 2018-02-18

## 2018-02-18 RX ORDER — LISINOPRIL 20 MG/1
40 TABLET ORAL DAILY
Status: DISCONTINUED | OUTPATIENT
Start: 2018-02-18 | End: 2018-02-26 | Stop reason: HOSPADM

## 2018-02-18 RX ORDER — OXYCODONE HYDROCHLORIDE 5 MG/1
2.5 TABLET ORAL
Status: DISCONTINUED | OUTPATIENT
Start: 2018-02-18 | End: 2018-02-26 | Stop reason: HOSPADM

## 2018-02-18 RX ORDER — MORPHINE SULFATE 4 MG/ML
2 INJECTION, SOLUTION INTRAMUSCULAR; INTRAVENOUS
Status: DISCONTINUED | OUTPATIENT
Start: 2018-02-18 | End: 2018-02-26 | Stop reason: HOSPADM

## 2018-02-18 RX ORDER — DIPHENHYDRAMINE HYDROCHLORIDE 50 MG/ML
25 INJECTION INTRAMUSCULAR; INTRAVENOUS EVERY 6 HOURS PRN
Status: DISCONTINUED | OUTPATIENT
Start: 2018-02-18 | End: 2018-02-26 | Stop reason: HOSPADM

## 2018-02-18 RX ORDER — FAMOTIDINE 20 MG/1
20 TABLET, FILM COATED ORAL 2 TIMES DAILY
Status: DISCONTINUED | OUTPATIENT
Start: 2018-02-18 | End: 2018-02-26 | Stop reason: HOSPADM

## 2018-02-18 RX ORDER — SODIUM CHLORIDE 9 MG/ML
1000 INJECTION, SOLUTION INTRAVENOUS ONCE
Status: COMPLETED | OUTPATIENT
Start: 2018-02-18 | End: 2018-02-18

## 2018-02-18 RX ORDER — HEPARIN SODIUM 1000 [USP'U]/ML
3800 INJECTION, SOLUTION INTRAVENOUS; SUBCUTANEOUS PRN
Status: DISCONTINUED | OUTPATIENT
Start: 2018-02-18 | End: 2018-02-19

## 2018-02-18 RX ORDER — OXYCODONE HYDROCHLORIDE 5 MG/1
5 TABLET ORAL
Status: DISCONTINUED | OUTPATIENT
Start: 2018-02-18 | End: 2018-02-26 | Stop reason: HOSPADM

## 2018-02-18 RX ORDER — LINEZOLID 2 MG/ML
600 INJECTION, SOLUTION INTRAVENOUS EVERY 12 HOURS
Status: DISCONTINUED | OUTPATIENT
Start: 2018-02-18 | End: 2018-02-18

## 2018-02-18 RX ORDER — AMLODIPINE BESYLATE 5 MG/1
5 TABLET ORAL DAILY
Status: DISCONTINUED | OUTPATIENT
Start: 2018-02-18 | End: 2018-02-23

## 2018-02-18 RX ORDER — PRAVASTATIN SODIUM 10 MG
20 TABLET ORAL EVERY EVENING
Status: DISCONTINUED | OUTPATIENT
Start: 2018-02-18 | End: 2018-02-26 | Stop reason: HOSPADM

## 2018-02-18 RX ORDER — SODIUM CHLORIDE 9 MG/ML
500 INJECTION, SOLUTION INTRAVENOUS
Status: DISCONTINUED | OUTPATIENT
Start: 2018-02-18 | End: 2018-02-26 | Stop reason: HOSPADM

## 2018-02-18 RX ORDER — SODIUM CHLORIDE 9 MG/ML
INJECTION, SOLUTION INTRAVENOUS
Status: COMPLETED
Start: 2018-02-18 | End: 2018-02-18

## 2018-02-18 RX ORDER — ACETAMINOPHEN 325 MG/1
650 TABLET ORAL EVERY 6 HOURS PRN
Status: DISCONTINUED | OUTPATIENT
Start: 2018-02-18 | End: 2018-02-26 | Stop reason: HOSPADM

## 2018-02-18 RX ORDER — SULFAMETHOXAZOLE AND TRIMETHOPRIM 800; 160 MG/1; MG/1
1 TABLET ORAL EVERY 12 HOURS
Status: DISCONTINUED | OUTPATIENT
Start: 2018-02-18 | End: 2018-02-21

## 2018-02-18 RX ORDER — DIGOXIN 250 MCG
250 TABLET ORAL DAILY
Status: DISCONTINUED | OUTPATIENT
Start: 2018-02-18 | End: 2018-02-26 | Stop reason: HOSPADM

## 2018-02-18 RX ORDER — DEXTROSE MONOHYDRATE 25 G/50ML
25 INJECTION, SOLUTION INTRAVENOUS
Status: DISCONTINUED | OUTPATIENT
Start: 2018-02-18 | End: 2018-02-20

## 2018-02-18 RX ORDER — BISACODYL 10 MG
10 SUPPOSITORY, RECTAL RECTAL
Status: DISCONTINUED | OUTPATIENT
Start: 2018-02-18 | End: 2018-02-26 | Stop reason: HOSPADM

## 2018-02-18 RX ADMIN — OXYCODONE HYDROCHLORIDE 5 MG: 5 TABLET ORAL at 15:45

## 2018-02-18 RX ADMIN — AMLODIPINE BESYLATE 5 MG: 5 TABLET ORAL at 08:41

## 2018-02-18 RX ADMIN — DIPHENHYDRAMINE HYDROCHLORIDE 25 MG: 50 INJECTION, SOLUTION INTRAMUSCULAR; INTRAVENOUS at 21:39

## 2018-02-18 RX ADMIN — LINEZOLID 600 MG: 600 TABLET, FILM COATED ORAL at 11:35

## 2018-02-18 RX ADMIN — FAMOTIDINE 20 MG: 20 TABLET, FILM COATED ORAL at 20:41

## 2018-02-18 RX ADMIN — SULFAMETHOXAZOLE AND TRIMETHOPRIM 1 TABLET: 800; 160 TABLET ORAL at 20:40

## 2018-02-18 RX ADMIN — LISINOPRIL 40 MG: 20 TABLET ORAL at 16:00

## 2018-02-18 RX ADMIN — PRAVASTATIN SODIUM 20 MG: 10 TABLET ORAL at 20:40

## 2018-02-18 RX ADMIN — MORPHINE SULFATE 2 MG: 4 INJECTION INTRAVENOUS at 01:44

## 2018-02-18 RX ADMIN — DIGOXIN 250 MCG: 250 TABLET ORAL at 08:41

## 2018-02-18 RX ADMIN — METOPROLOL TARTRATE 75 MG: 25 TABLET, FILM COATED ORAL at 20:40

## 2018-02-18 RX ADMIN — INSULIN HUMAN 2 UNITS: 100 INJECTION, SOLUTION PARENTERAL at 16:28

## 2018-02-18 RX ADMIN — OXYCODONE HYDROCHLORIDE 5 MG: 5 TABLET ORAL at 11:38

## 2018-02-18 RX ADMIN — HEPARIN SODIUM 1450 UNITS: 5000 INJECTION, SOLUTION INTRAVENOUS at 03:13

## 2018-02-18 RX ADMIN — FAMOTIDINE 20 MG: 20 TABLET, FILM COATED ORAL at 10:35

## 2018-02-18 RX ADMIN — OXYCODONE HYDROCHLORIDE 5 MG: 5 TABLET ORAL at 08:41

## 2018-02-18 RX ADMIN — AMPICILLIN SODIUM AND SULBACTAM SODIUM 3 G: 2; 1 INJECTION, POWDER, FOR SOLUTION INTRAMUSCULAR; INTRAVENOUS at 15:44

## 2018-02-18 RX ADMIN — SULFAMETHOXAZOLE AND TRIMETHOPRIM 1 TABLET: 800; 160 TABLET ORAL at 15:46

## 2018-02-18 RX ADMIN — HEPARIN SODIUM 1650 UNITS/HR: 5000 INJECTION, SOLUTION INTRAVENOUS at 11:06

## 2018-02-18 RX ADMIN — OXYCODONE HYDROCHLORIDE 5 MG: 5 TABLET ORAL at 20:41

## 2018-02-18 RX ADMIN — HEPARIN SODIUM 25000 UNITS: 5000 INJECTION, SOLUTION INTRAVENOUS at 20:41

## 2018-02-18 RX ADMIN — METOPROLOL TARTRATE 75 MG: 25 TABLET, FILM COATED ORAL at 02:04

## 2018-02-18 RX ADMIN — PRAVASTATIN SODIUM 20 MG: 10 TABLET ORAL at 02:04

## 2018-02-18 RX ADMIN — METOPROLOL TARTRATE 75 MG: 25 TABLET, FILM COATED ORAL at 08:41

## 2018-02-18 RX ADMIN — SODIUM CHLORIDE: 9 INJECTION, SOLUTION INTRAVENOUS at 16:00

## 2018-02-18 RX ADMIN — HEPARIN SODIUM 3800 UNITS: 1000 INJECTION, SOLUTION INTRAVENOUS; SUBCUTANEOUS at 11:05

## 2018-02-18 RX ADMIN — GADODIAMIDE 20 ML: 287 INJECTION INTRAVENOUS at 18:03

## 2018-02-18 RX ADMIN — SODIUM CHLORIDE 1000 ML: 9 INJECTION, SOLUTION INTRAVENOUS at 02:08

## 2018-02-18 ASSESSMENT — ENCOUNTER SYMPTOMS
PALPITATIONS: 0
NEUROLOGICAL NEGATIVE: 1
TINGLING: 0
DEPRESSION: 0
PSYCHIATRIC NEGATIVE: 1
GASTROINTESTINAL NEGATIVE: 1
ABDOMINAL PAIN: 0
EYES NEGATIVE: 1
MYALGIAS: 0
VOMITING: 0
WHEEZING: 0
CHILLS: 0
FEVER: 0
DIARRHEA: 0
FEVER: 1
PHOTOPHOBIA: 0
DIZZINESS: 0
SORE THROAT: 0
HEADACHES: 0
NAUSEA: 0
COUGH: 0
CARDIOVASCULAR NEGATIVE: 1
RESPIRATORY NEGATIVE: 1
SHORTNESS OF BREATH: 0
FOCAL WEAKNESS: 0
MUSCULOSKELETAL NEGATIVE: 1

## 2018-02-18 ASSESSMENT — PAIN SCALES - GENERAL
PAINLEVEL_OUTOF10: 6
PAINLEVEL_OUTOF10: 9
PAINLEVEL_OUTOF10: 8
PAINLEVEL_OUTOF10: 6
PAINLEVEL_OUTOF10: 5

## 2018-02-18 ASSESSMENT — LIFESTYLE VARIABLES
ALCOHOL_USE: NO
EVER_SMOKED: NEVER

## 2018-02-18 ASSESSMENT — PATIENT HEALTH QUESTIONNAIRE - PHQ9
SUM OF ALL RESPONSES TO PHQ9 QUESTIONS 1 AND 2: 0
2. FEELING DOWN, DEPRESSED, IRRITABLE, OR HOPELESS: NOT AT ALL
SUM OF ALL RESPONSES TO PHQ QUESTIONS 1-9: 0

## 2018-02-18 NOTE — H&P
Hospital Medicine History and Physical    Date of Service  2/17/2018    Chief Complaint  Chief Complaint   Patient presents with   • Wound Check     Hx of diabetic ulcer to right foot with amputation in November 2017, right found red, open wound to amputation site       History of Presenting Illness  53 y.o. male who presented on 2/17/2018 with right foot pain and swelling at the surgical site.  Patient has a previous history of a right great toe amputation in November with wound cultures positive for MRSA and enterococcus. He had been discharged home on oral Zyvox based on recommendations from infectious disease. He was medically noncompliant and did not  his medications which had been electronically sent to the pharmacy of his choice. He had returned to the hospital for a separate complaint and at that time it was noted he was not on antibiotic therapy and a 2nd prescription was provided to him but he failed to  the medications again. On a 3rd presentation to the hospital, he was readmitted and started on appropriate antibiotic therapy and discharged home after being seen by orthopedic surgery and arranged for outpatient wound care.  The patient states that he has been medically compliant with his Zyvox after his last hospital admission. He was seen by wound care on Wednesday and states that at that time his wound was healing well. He completed his last dose of Zyvox on Wednesday and by this evening, he began to have some tenderness and pain in his foot. He unwrapped his dressings and noted that his foot had become erythematous, more swollen, and had purulent discharge. He denies any fevers, chills, nausea, vomiting, headaches, chest pain, shortness of breath, or abdominal pain. Upon assessment in the emergency room, the patient's right foot was swollen, erythematous, and had a ulceration with some purulent discharge.       Primary Care Physician  Jerad Lomax M.D.    Consultants  Orthopedic  surgery    Code Status  Fall    Review of Systems  Review of Systems   Constitutional: Negative for chills and fever.   HENT: Negative for congestion and sore throat.    Eyes: Negative for photophobia.   Respiratory: Negative for cough, shortness of breath and wheezing.    Cardiovascular: Negative for chest pain and palpitations.   Gastrointestinal: Negative for abdominal pain, diarrhea, nausea and vomiting.   Genitourinary: Negative for dysuria.   Musculoskeletal: Negative for myalgias.   Skin:        Right foot swelling and pain at the surgical site   Neurological: Negative for dizziness, tingling, focal weakness and headaches.   Psychiatric/Behavioral: Negative for depression and suicidal ideas.        Past Medical History  Past Medical History:   Diagnosis Date   • A-fib (CMS-[facility])    • Chest pain    • Diabetes (CMS-[facility])    • Diabetic neuropathy (CMS-[facility])    • Hypercholesterolemia    • Hypertension    • Morbid obesity (CMS-[facility])    • Noncompliance    • Normal cardiac stress test        Surgical History  Past Surgical History:   Procedure Laterality Date   • TOE AMPUTATION Right 1/17/2018    Procedure: TOE AMPUTATION-1ST RAY;  Surgeon: Doug Delong M.D.;  Location: SURGERY [facility];  Service: Orthopedics   • TOE AMPUTATION Right 11/10/2017    Procedure: TOE AMPUTATION;  Surgeon: Osorio Leo M.D.;  Location: SURGERY [facility];  Service: Orthopedics       Medications  No current facility-administered medications on file prior to encounter.      Current Outpatient Prescriptions on File Prior to Encounter   Medication Sig Dispense Refill   • metoprolol 75 MG Tab Take 75 mg by mouth 2 Times a Day. 60 Tab 3   • amLODIPine (NORVASC) 5 MG Tab Take 1 Tab by mouth every day. 30 Tab 3   • digoxin (LANOXIN) 250 MCG Tab Take 250 mcg by mouth every day.     • rivaroxaban (XARELTO) 20 MG Tab tablet Take 1 Tab by mouth with dinner. 30 Tab 0   • famotidine (PEPCID) 20 MG Tab Take 20 mg by mouth 2 times a day.      • aspirin EC 81 MG EC tablet Take 1 Tab by mouth every day. 30 Tab    • metformin (GLUCOPHAGE) 500 MG Tab Take 2 Tabs by mouth 2 times a day, with meals. 60 Tab 0   • pravastatin (PRAVACHOL) 20 MG Tab Take 1 Tab by mouth every evening. 30 Tab 11   • lisinopril (PRINIVIL, ZESTRIL) 40 MG tablet Take 1 Tab by mouth every day. 30 Tab        Family History  History reviewed. No pertinent family history.    Social History  Social History   Substance Use Topics   • Smoking status: Never Smoker   • Smokeless tobacco: Never Used   • Alcohol use No       Allergies  Allergies   Allergen Reactions   • Daptomycin Rash     Body rash  RXN=2018     • Pcn [Penicillins] Hives and Rash      Rash & hives  Tolerating Ampicillin + Sulbactam 17  RXN=since a kid        Physical Exam  Laboratory   Hemodynamics  Temp (24hrs), Av.6 °C (97.8 °F), Min:36.6 °C (97.8 °F), Max:36.6 °C (97.8 °F)   Temperature: 36.6 °C (97.8 °F)  Pulse  Av.5  Min: 69  Max: 72    Blood Pressure: (!) 162/70, NIBP: (!) 169/74      Respiratory      Respiration: 18, Pulse Oximetry: 96 % (Simultaneous filing. User may not have seen previous data.)             Physical Exam  Capillary refill less than 3 seconds, distal pulses intact    Recent Labs      18   2245   WBC  11.4*   RBC  4.69*   HEMOGLOBIN  13.3*   HEMATOCRIT  40.1*   MCV  85.5   MCH  28.4   MCHC  33.2*   RDW  51.1*   PLATELETCT  297   MPV  10.1     Recent Labs      18   2245   SODIUM  137   POTASSIUM  3.8   CHLORIDE  101   CO2  26   GLUCOSE  160*   BUN  18   CREATININE  0.65   CALCIUM  9.4     Recent Labs      18   2245   GLUCOSE  160*                 Lab Results   Component Value Date    TROPONINI <0.01 2018       Imaging  Dx-chest-for Picc Line Perform Procedure In: Patient's Room    Result Date: 2018 11:02 PM HISTORY/REASON FOR EXAM:  PICC line placement. TECHNIQUE/EXAM DESCRIPTION AND NUMBER OF VIEWS: Single view of the chest. COMPARISON: 2017  FINDINGS: Cardiac mediastinal contour is unchanged. Lungs show hypoinflation. Pulmonary vessels are again mildly prominent. No pleural fluid collection or pneumothorax. LEFT arm PICC line in place with tip at the mid SVC. No major bony abnormality is seen.     1.  Supportive tubing as described above. 2.  No pneumonia or pneumothorax. 3.  Pulmonary vasculature is again mildly prominent.    Dx-chest-limited (1 View)    Result Date: 2/1/2018 2/1/2018 1:00 PM HISTORY/REASON FOR EXAM:  Chest Pain Right chest pain TECHNIQUE/EXAM DESCRIPTION AND NUMBER OF VIEWS: Single portable view of the chest. COMPARISON: 1/26/2018 FINDINGS: No pulmonary infiltrates or consolidations are noted. No pleural effusion. No pneumothorax. Stable enlarged cardiopericardial silhouette.     Stable cardiomegaly No acute cardiopulmonary abnormalities are identified.    Dx-chest-limited (1 View)    Addendum Date: 1/26/2018    Addendum: Left PICC line is seen terminating overlying the expected location of the proximal superior vena cava    Result Date: 1/26/2018 1/26/2018 3:16 AM HISTORY/REASON FOR EXAM:  PICC line placement TECHNIQUE/EXAM DESCRIPTION:  Single AP view of the chest. COMPARISON: January 20, 2018 FINDINGS: Overlying cardiac leads are present. The heart is in the upper limits of normal for size. The mediastinal contour appears within normal limits.  The central pulmonary vasculature appears normal. The lungs appear well expanded bilaterally.  Bilateral lungs are clear. No significant pleural effusions are identified. The bony structures appear age-appropriate.     1.  No acute cardiopulmonary disease.    Dx-chest-portable (1 View)    Result Date: 2/8/2018 2/8/2018 2:57 PM HISTORY/REASON FOR EXAM:  Chest Pain Mid sternal chest pain. TECHNIQUE/EXAM DESCRIPTION AND NUMBER OF VIEWS: Single portable view of the chest. COMPARISON: 2/1/18 FINDINGS: No pulmonary infiltrates or consolidations are noted. No pleural effusion. No pneumothorax.  Stable enlarged cardiopericardial silhouette.     1. Stable cardiomegaly No pulmonary infiltrates or consolidations are noted.    Dx-foot-complete 3+ Right    Result Date: 2/17/2018 2/17/2018 11:04 PM HISTORY/REASON FOR EXAM:  Right foot infection, open wound TECHNIQUE/EXAM DESCRIPTION AND NUMBER OF VIEWS: 3 views of the RIGHT foot. COMPARISON:  Right foot x-ray 2/2/2018 FINDINGS: There has been first transmetatarsal amputation. The stump appears within normal limits. There is adjacent soft tissue swelling. 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 There are subluxations at multiple metatarsophalangeal joint. There is spurring at the insertion of achilles' tendon and origin of plantar fascia.     1.  Prior first transmetatarsal amputation 2.  Midfoot osteoarthritis 3.  calcaneal spurring    Dx-foot-complete 3+ Right    Result Date: 2/2/2018 2/2/2018 4:40 AM HISTORY/REASON FOR EXAM:  Right foot pain.. TECHNIQUE/EXAM DESCRIPTION AND NUMBER OF VIEWS:  3 views of the RIGHT foot. COMPARISON: 1/15/2018 FINDINGS: There is evidence of amputation through the proximal shaft of the first metatarsal, which has occurred since the prior images. There is generalized soft tissue swelling about the distal foot. No soft tissue gas however. No acute bony abnormality.     No acute bony abnormality.    Ir-picc Line Placement > Age 5    Result Date: 1/19/2018  HISTORY/REASON FOR EXAM:   PICC placement. TECHNIQUE/EXAM DESCRIPTION AND NUMBER OF VIEWS:   PICC line insertion with ultrasound guidance.  The procedure was performed using maximal sterile barrier technique including sterile gown, mask, cap, and donning of sterile gloves following appropriate hand hygiene and/or sterile scrub. Patient skin site was prepped with 2% Chlorhexidine solution. FINDINGS:  PICC line insertion with Ultrasound Guidance was performed by qualified nursing staff without the assistance of a Radiologist.  A follow up chest x-ray will be performed to determine  appropriateness of PICC positioning.              Ultrasound-guided PICC placement performed by qualified nursing staff as above.     Echocardiogram-comp W/ Cont    Result Date: 2018  Transthoracic Echo Report Echocardiography Laboratory CONCLUSIONS Technically difficult but adequate study for interpretation. Contrast was used to enhance visualization of the endocardial border. Normal left ventricular chamber size. Normal left ventricular systolic function. Left ventricular ejection fraction is visually estimated to be 60%. Mild concentric left ventricular hypertrophy. Trace mitral regurgitation. Normal left atrial size. Structurally normal aortic valve without significant stenosis or regurgitation. Mild tricuspid regurgitation. Estimated right ventricular systolic pressure  is 35 mmHg. Normal inferior vena cava size and inspiratory collapse. Normal right ventricular size and systolic function. EMELY FLORES Exam Date:         2018                    08:43 Exam Location:     Inpatient Priority:          Routine Ordering Physician:        FABIAN RAMIRES Referring Physician: Sonographer:               Sherry Funes RDCS Age:    52     Gender:    M MRN:    [id number] :    1965 BSA:    2.51   Ht (in):    72     Wt (lb):    295 Exam Type:     Complete, Contrast Indications:     Paroxysmal atrial fibrillation ICD Codes:       I480 CPT Codes:       31949,  BP:   145    /   101    HR:   110 Technical Quality:       Technically difficult study -                          adequate information is obtained MEASUREMENTS  (Male / Female) Normal Values 2D ECHO LV Diastolic Diameter PLAX        4.4 cm                4.2 - 5.9 / 3.9 - 5.3 cm LV Systolic Diameter PLAX         2.5 cm                2.1 - 4.0 cm IVS Diastolic Thickness           1.5 cm                LVPW Diastolic Thickness          1.1 cm                RV Diameter 4C                    3.2 cm                2.5 - 2.9 cm LVOT Diameter                      2.1 cm                RA Diameter                       3.6 cm                Estimated LV Ejection Fraction    60 %                  LV Ejection Fraction MOD BP       54.7 %                >= 55  % LV Ejection Fraction MOD 4C       64.7 %                LV Ejection Fraction MOD 2C       55.5 %                LA Volume Index                   28.8 cm³/m²           16 - 28 cm³/m² IVC Diameter                      1.5 cm                M-MODE Aortic Root Diameter MM           3.9 cm                DOPPLER AV Peak Velocity                  1.3 m/s               AV Peak Gradient                  7 mmHg                AV Mean Gradient                  3.5 mmHg              LVOT Peak Velocity                1.2 m/s               AV Area Cont Eq vti               3 cm²                 TR Peak Velocity                  239 cm/s              PV Peak Velocity                  0.91 m/s              PV Peak Gradient                  3.3 mmHg              RVOT Peak Velocity                0.69 m/s              * Indicates values subject to auto-interpretation LV EF:  60    % FINDINGS Left Ventricle 3 mL of contrast was administered. Contrast was used to enhance visualization of the endocardial border. Existing IV was used. Normal left ventricular chamber size. Mild concentric left ventricular hypertrophy. Normal left ventricular systolic function. Left ventricular ejection fraction is visually estimated to be 60%. Diastolic function is difficult to assess with atrial fibrillation. Right Ventricle Normal right ventricular size and systolic function. Right Atrium Normal right atrial size. Normal inferior vena cava size and inspiratory collapse. Left Atrium Normal left atrial size. Mitral Valve Thickened mitral valve leaflets. Trace mitral regurgitation. Aortic Valve Structurally normal aortic valve without significant stenosis or regurgitation. Tricuspid Valve Mild tricuspid regurgitation. Estimated right ventricular  systolic pressure  is 35 mmHg. Pulmonic Valve Structurally normal pulmonic valve without significant stenosis or regurgitation. Pericardium Normal pericardium without effusion. Aorta Aortic root dilatation measuring 3.9 cm. Rocio Ross M.D. (Electronically Signed) Final Date:     2018                 14:23    Le Venous Duplex (specify In Comments Left, Right Or Bilateral)    Result Date: 2018   Vascular Laboratory  CONCLUSIONS  Normal bilateral superficial and deep venous examination of the right leg.  MARTHAFRANCK EMELY  Exam Date:     2018 04:36  Room #:     Inpatient  Priority:     Routine  Ht (in):             Wt (lb):  Ordering Physician:        SHIRLEY WILLIAMSON  Referring Physician:       [id number]  Sonographer:               YOKASTA troncoso rvt  Study Type:  Technical Quality:         Adequate  Age:    52    Gender:     M  MRN:    [id number]  :    1965      BSA:  Indications:     RT LEG SWELLING  CPT Codes:  ICD Codes:  History:         Swelling of the rt limb  Limitations:     body habitus in calf  PROCEDURES:  rt lower ext venous doppler  FINDINGS:  Complete color filling and compressibility with normal venous flow dynamics  including spontaneous flow, response to augmentation maneuvers, and  respiratory phasicity.  The peroneal and posterior tibial veins are difficult to assess for  compressibility, but flow response to augmentation is demonstrated.  GSV DST THIGH Densely echogenic material is within  the vein that is  consistent with chronic venous thrombosis. FLOW IS SEEN  ___vein is partially compressible.  AREA OF PAIN - edema obscures the visualization of the  lat/post calf  Germain Craven  (Electronically Signed)  Final Date:      2018                   07:11    Nm-cardiac Stress Test    Addendum Date: 2018    Baseline ECG:atrial fibrillation Stress ECG: No ischemic T wave changes with peak stress Impression: Normal stress ECG, see nuclear images      Result Date: 2018   Myocardial Perfusion  Report  NUCLEAR IMAGING INTERPRETATION  No evidence of significant jeopardized viable myocardium or prior myocardial  infarction.  Normal left ventricular size, ejection fraction, and wall motion.  EMELY FLORES  MRN:    [id number]         Gender:    M  Exam Date: 2018 06:35  Exam Location:      Inpatient  Ordering Phys:     ALETHEA MASSEY  NucMed Tech:       RT Rasheeda  Age:    52    :    1965        Ht (in):     72  Wt (lb):     298    BMI:    40.37       Radiologist  Risk Factors:             Family history of coronary disease, Diabetes,                            Hypertension  Indications:              Shortness of breath, Chest pain, unspecified  ICD Codes:                  R079  Cardiac History:          None reported  Cardiac Meds:  Meds Past 24 hrs:  Pretest Chest Pain:  STRESS TEST      Pharmacologi                   c  Protoc   Lexiscan       Dose: 0.4 mg  ol:  Post-Injection Exercise:  Resting HR (bpm):      95  Peak HR (bpm):         109  Resting BP (mmHg):       122    /   77  Peak BP (mmHg):       131   /   92  MaxPHR:     166     Target HR (bpm):       141  % MaxPHR:     66  Double Product:       54066  BP Response:  Stress Termination:       Completed Protocol  Stress Symptoms:  Arrhythmia: None Chest Pain: None Dyspnea Flushing, Headache, Somach Cramps,  Leg Fatigue  ECG  Resting ECG:  Stress ECG:  IMAGE PROTOCOL      Rest/Stress 1                      Day          RadiopharmaceuticalDose (mCi)   Imaging  Date      Imaging  Time         Inj to Img Time (min)  Rest:   Tc-99m             7.4          2018        13:36          Tetrofosmin  Stress: Tc-99m             25.0         2018        14:27          Tetrofosmin  Rest:  Administration Site:       Left forearm  Administered by:      RT Rasheeda  Stress:  Administration Site:       Left forearm  Administered by:      Graeme  ELISA Joaquin  % Percent HR Achieved:  SPECT RESULTS  Technical Quality:       Good  Raw Data Analysis:  Summed Stress Score:    2  Summed Rest Score:    2  Summed Difference Score:        2  PERFUSION:  Normal myocardial perfusion with no ischemia.  FUNCTIONAL RESULTS (calculated via Gated SPECT)  Stress Image LV EF:        64     %  Upper Normal Limit  Stress EDV:      92     ml   EDVI:    36      ml/m²  Stress ESV:      33     ml   ESVI:    13      ml/m²  TID:    0.96   TID - 1.19      TID (ed) - 1.23  LV Function:  Normal left ventricular wall motion.  LV ejection fraction = 64%.  Shmuel Rai  (Electronically Signed)  Final Date:      29 January 2018                   15:38    Ct-cta Chest Pulmonary Artery W/ Recons    Result Date: 2/2/2018 2/2/2018 4:04 AM HISTORY/REASON FOR EXAM:  Pain TECHNIQUE/EXAM DESCRIPTION: CT angiogram scan for pulmonary embolism with contrast, with reconstructions. 1.25 mm helical sections were obtained from the lung apices through the lung bases following the rapid bolus administration of 90 mL of Omnipaque 350 nonionic contrast. Thin-section overlapping reconstruction interval was utilized. Coronal reconstructions were generated from the axial data. MIP post processing was performed and utilized for the interpretation. Low dose optimization technique was utilized for this CT exam including automated exposure control and adjustment of the mA and/or kV according to patient size. COMPARISON: None FINDINGS: Pulmonary Embolism: No. Lungs: No suspicious nodules or air space process. Pleura: No pleural effusion. Nodes: No enlarged lymph nodes. Additional findings: There are multiple focal gas lucencies within the gallbladder, consistent with known opaque gallstones. Localized increased opacity is seen in the retroareolar regions of both breasts, consistent with gynecomastia..     1.  No evidence of pulmonary emboli or other acute intrathoracic abnormality. 2.  Cholelithiasis. 3.   Gynecomastia.      Assessment/Plan     Anticipate that patient will need greater than 2 midnights for management of the discussed medical issues.    This dictation was created using voice recognition software. The accuracy of the dictation is limited to the abilities of the software. I expect there may be some errors of grammar and possibly content.    * Diabetic foot infection (CMS-[facility])- (present on admission)   Assessment & Plan    Patient has a chronic history of medical noncompliance, he had not picked up his Zyvox 2 times after his November amputation and required admission in the last several weeks for reinitiation of IV therapy. He states that he was compliant with his Zyvox this time but that once the antibiotics were completed, his wound infection worsened. His previous microbiology was positive for MRSA and enterococcus. His foot is red and angry looking with some purulence. I will resume his Zyvox and we will plan to consult infectious disease in the morning. I have requested limb preservation services but he will most likely need further intervention by orthopedic surgery. Dr. Lopez has been consulted by the emergency room physician and I look forward to his recommendations. I will keep the patient nothing by mouth in the event that orthopedic surgery would like to proceed to the emergency room in the morning. He will receive symptomatic management for pain. We will monitor any cultures obtained intraoperatively for new speciation and sensitivities.        Type 2 diabetes mellitus (CMS-[facility])- (present on admission)   Assessment & Plan    I will hold the patient's metformin for now and monitor him on Accu-Cheks and cover him with insulin sliding scale.        HTN (hypertension)- (present on admission)   Assessment & Plan    This is chronic, blood pressure is currently controlled on home Norvasc and Prinivil which I will continue.        Atrial fibrillation (CMS-[facility])- (present on admission)   Assessment  & Plan    I'm holding the patient's xarelto in the event that he will need to have surgery, he will be on a heparin drip for coverage in the meantime. I will continue his home digoxin and metoprolol.        HLD (hyperlipidemia)- (present on admission)   Assessment & Plan    This is chronic, patient denies any chest pain, continue home Pravachol and aspirin.          Prophylaxis: Patient will be on heparin, no additional need for DVT prophylaxis, no PPI indicated, stool softeners ordered

## 2018-02-18 NOTE — ED PROVIDER NOTES
ED Provider Note    ED Provider Note      Primary care provider: Jerad Lomax M.D.    CHIEF COMPLAINT  Chief Complaint   Patient presents with   • Wound Check     Hx of diabetic ulcer to right foot with amputation in November 2017, right found red, open wound to amputation site       HPI  Sebastián Castro is a 53 y.o. male who presents to the Emergency Department with chief complaint of foot ulceration. Patient has long-standing history of diabetic foot ulcer status post amputation of the right hallux. Was recently being treated for infection of this surgical site just completed a seven-day course of Zyvox after in IV antibiotics physician about X2 days prior. Wound check on Wednesday evening stated that one care said that the wound looked good at that time this evening he was changing his own vanished on removing the bandage the area was red warm with purulent drainage. Minimal pain at the site but does have severe diabetic neuropathy. He's had no fevers no chills no nausea no vomiting reports no other conscious and other concerns at this time. No alleviating or aggravating factors to pain    REVIEW OF SYSTEMS  10 systems reviewed and otherwise negative, pertinent positives and negatives listed in the history of present illness.    PAST MEDICAL HISTORY   has a past medical history of A-fib (CMS-[facility]); Chest pain; Diabetes (CMS-[facility]); Diabetic neuropathy (CMS-[facility]); Hypercholesterolemia; Hypertension; Morbid obesity (CMS-[facility]); Noncompliance; and Normal cardiac stress test.    SURGICAL HISTORY   has a past surgical history that includes toe amputation (Right, 11/10/2017) and toe amputation (Right, 1/17/2018).    SOCIAL HISTORY  Social History   Substance Use Topics   • Smoking status: Never Smoker   • Smokeless tobacco: Never Used   • Alcohol use No      History   Drug Use No       FAMILY HISTORY  Non-Contributory    CURRENT MEDICATIONS  Home Medications    **Home medications have not yet been reviewed for this  encounter**         ALLERGIES  Allergies   Allergen Reactions   • Daptomycin Rash     Body rash  RXN=1/2018     • Pcn [Penicillins] Hives and Rash      Rash & hives  Tolerating Ampicillin + Sulbactam 11/12/17  RXN=since a kid       PHYSICAL EXAM  VITAL SIGNS: BP (!) 162/70   Pulse 68   Temp 36.6 °C (97.8 °F)   Resp 18   Ht 1.829 m (6')   Wt (!) 133.8 kg (295 lb)   SpO2 96%   BMI 40.01 kg/m²   Pulse ox interpretation: I interpret this pulse ox as normal.  Constitutional: Alert and oriented x 3, minimal Distress  HEENT: Atraumatic normocephalic, pupils are equal round reactive to light extraocular movements are intact. The nares is clear, external ears are normal, mouth shows moist mucous membranes  Neck: Supple, no JVD no tracheal deviation  Cardiovascular: Regular rate and rhythm no murmur rub or gallop 2+ pulses peripherally x4  Thorax & Lungs: No respiratory distress, no wheezes rales or rhonchi, No chest tenderness.   GI: Morbidly obese Soft nontender nondistended positive bowel sounds, no peritoneal signs  Skin: Warm dry no acute rash or lesion  Musculoskeletal: Moving all extremities with full range and 5 of 5 strength, no acute  deformity status post amputation of right hallux. Approximately 2 cm proximal to the surgical site on the medial aspect of the midfoot there is approximately 2 cm of skin breakdown with purulent drainage surrounding erythema extending in the dorsum of the foot in the ankle warm to the touch  Neurologic: Cranial nerves III through XII are grossly intact, no sensory deficit, no cerebellar dysfunction   Psychiatric: Appropriate affect for situation at this time      DIAGNOSTIC STUDIES / PROCEDURES  LABS  Results for orders placed or performed during the hospital encounter of 02/17/18   CBC WITH DIFFERENTIAL   Result Value Ref Range    WBC 11.4 (H) 4.8 - 10.8 K/uL    RBC 4.69 (L) 4.70 - 6.10 M/uL    Hemoglobin 13.3 (L) 14.0 - 18.0 g/dL    Hematocrit 40.1 (L) 42.0 - 52.0 %    MCV  85.5 81.4 - 97.8 fL    MCH 28.4 27.0 - 33.0 pg    MCHC 33.2 (L) 33.7 - 35.3 g/dL    RDW 51.1 (H) 35.9 - 50.0 fL    Platelet Count 297 164 - 446 K/uL    MPV 10.1 9.0 - 12.9 fL    Neutrophils-Polys 57.20 44.00 - 72.00 %    Lymphocytes 30.00 22.00 - 41.00 %    Monocytes 6.50 0.00 - 13.40 %    Eosinophils 5.50 0.00 - 6.90 %    Basophils 0.40 0.00 - 1.80 %    Immature Granulocytes 0.40 0.00 - 0.90 %    Nucleated RBC 0.00 /100 WBC    Neutrophils (Absolute) 6.50 1.82 - 7.42 K/uL    Lymphs (Absolute) 3.40 1.00 - 4.80 K/uL    Monos (Absolute) 0.74 0.00 - 0.85 K/uL    Eos (Absolute) 0.62 (H) 0.00 - 0.51 K/uL    Baso (Absolute) 0.05 0.00 - 0.12 K/uL    Immature Granulocytes (abs) 0.04 0.00 - 0.11 K/uL    NRBC (Absolute) 0.00 K/uL   BASIC METABOLIC PANEL   Result Value Ref Range    Sodium 137 135 - 145 mmol/L    Potassium 3.8 3.6 - 5.5 mmol/L    Chloride 101 96 - 112 mmol/L    Co2 26 20 - 33 mmol/L    Glucose 160 (H) 65 - 99 mg/dL    Bun 18 8 - 22 mg/dL    Creatinine 0.65 0.50 - 1.40 mg/dL    Calcium 9.4 8.5 - 10.5 mg/dL    Anion Gap 10.0 0.0 - 11.9   CRP QUANTITIVE (NON-CARDIAC)   Result Value Ref Range    Stat C-Reactive Protein 0.88 (H) 0.00 - 0.75 mg/dL   ESTIMATED GFR   Result Value Ref Range    GFR If African American >60 >60 mL/min/1.73 m 2    GFR If Non African American >60 >60 mL/min/1.73 m 2       All labs reviewed by me.      RADIOLOGY  DX-FOOT-COMPLETE 3+ RIGHT   Final Result      1.  Prior first transmetatarsal amputation      2.  Midfoot osteoarthritis      3.  calcaneal spurring        The radiologist's interpretation of all radiological studies have been reviewed by me.    COURSE & MEDICAL DECISION MAKING  Pertinent Labs & Imaging studies reviewed. (See chart for details)    10:31 PM - Patient seen and examined at bedside.         Medical Decision Making: Vital signs unremarkable at this time her foot appears grossly infected after just completing a course of Zyvox 2 days prior. We will obtain wound and blood  cultures restart Zyvox. Laboratory studies pending. X-ray pending will likely require admission for further antibiotic administration and MRI of foot/orthopedics consultation.      57 resulted as above x-ray as above. After cultures were obtained patient was given 1 dose of Zyvox discussed with hospitalist Dr. Rowe as well as orthopedist Dr. Lpoez. Both of their help is greatly appreciated admitted to the hospitalist service orthopedic consultation. Admitted and guarded condition.        BP (!) 162/70   Pulse 68   Temp 36.6 °C (97.8 °F)   Resp 18   Ht 1.829 m (6')   Wt (!) 133.8 kg (295 lb)   SpO2 96%   BMI 40.01 kg/m²     FINAL IMPRESSION  1. Diabetic ulcer of right foot associated with other specified diabetes mellitus, unspecified part of foot, unspecified ulcer stage (CMS-HCC)      2. Acute cellulitis, concern for osteomyelitis    This dictation has been created using voice recognition software and/or scribes. The accuracy of the dictation is limited by the abilities of the software and the expertise of the scribes. I expect there may be some errors of grammar and possibly content. I made every attempt to manually correct the errors within my dictation. However, errors related to voice recognition software and/or scribes may still exist and should be interpreted within the appropriate context.

## 2018-02-18 NOTE — ED NOTES
Med rec updated and complete.  Allergies reviewed.  Pt finished a course of linezolid on 02/15/18.  7 day course.

## 2018-02-18 NOTE — PROGRESS NOTES
[facility] Hospitalist Progress Note    Date of Service: 2018    Chief Complaint  53 y.o. male with history of right great toe amputation in November his wound cultures positive for MRSA and clinical course, was noncompliant with oral Zyvox, admitted 2018 with worsening of diabetes for wound on the right side, right foot cellulitis    Interval Problem Update  Right foot is erythematous, swollen, tender to palpation, hot to touch. 2 cm skin breakdown with purulent discharge surroundings and erythema on the dorsum of the foot 2 cm below amputation site.  had fever yesterday, but none today. The pressure slightly up 1677/88  Started on Zyvox for admission. Running IV heparin  Apparently, orthopedics consulted in the ER, no notes noted.  LPS consult pending  I contacted Dr. Holley/infectious disease and asked to see the patient, who agreed.    Consultants/Specialty  Infectious disease  Orthopedics  Limb preservation  Disposition  Inpatient        Review of Systems   Constitutional: Positive for fever.   HENT: Negative.    Eyes: Negative.    Respiratory: Negative.    Cardiovascular: Negative.    Gastrointestinal: Negative.    Genitourinary: Negative.    Musculoskeletal: Negative.         Swelling, erythema, pain, wants the right foot   Skin: Negative.    Neurological: Negative.    Endo/Heme/Allergies: Negative.    Psychiatric/Behavioral: Negative.    All other systems reviewed and are negative.     Physical Exam  Laboratory/Imaging   Hemodynamics  Temp (24hrs), Av.7 °C (98 °F), Min:36.3 °C (97.4 °F), Max:37.1 °C (98.8 °F)   Temperature: 37.1 °C (98.8 °F)  Pulse  Av.8  Min: 62  Max: 77    Blood Pressure: (!) 167/88, NIBP: 157/57      Respiratory      Respiration: 17, Pulse Oximetry: 94 %             Fluids  No intake or output data in the 24 hours ending 18 0832    Nutrition  Orders Placed This Encounter   Procedures   • Diet NPO     Standing Status:   Standing     Number of Occurrences:   1     Order  Specific Question:   Restrict to:     Answer:   Sips with Medications [3]     Physical Exam   Constitutional: He is oriented to person, place, and time. He appears well-developed and well-nourished.   HENT:   Head: Normocephalic and atraumatic.   Eyes: EOM are normal. Pupils are equal, round, and reactive to light.   Neck: Normal range of motion. Neck supple.   Cardiovascular: Normal rate and regular rhythm.    Pulmonary/Chest: Effort normal and breath sounds normal. No respiratory distress.   Abdominal: Soft. Bowel sounds are normal.   Musculoskeletal:   Dorsum of the right foot is tender, erythematous, swollen, hot to touch. Approximately 2 cm skin breakdown on the dorsum of the foot with signs of recent purulent discharge.  2 Dry clean ulcers, covered with eschar on the lateral side of the foot 1 cm.    Neurological: He is alert and oriented to person, place, and time.   Skin: Skin is warm and dry.   Psychiatric: He has a normal mood and affect. His behavior is normal.   Nursing note and vitals reviewed.      Recent Labs      02/17/18   2245   WBC  11.4*   RBC  4.69*   HEMOGLOBIN  13.3*   HEMATOCRIT  40.1*   MCV  85.5   MCH  28.4   MCHC  33.2*   RDW  51.1*   PLATELETCT  297   MPV  10.1     Recent Labs      02/17/18   2245   SODIUM  137   POTASSIUM  3.8   CHLORIDE  101   CO2  26   GLUCOSE  160*   BUN  18   CREATININE  0.65   CALCIUM  9.4                      Assessment/Plan     * Diabetic foot infection (CMS-HCC)- (present on admission)   Assessment & Plan    - Was not compliant with by mouth antibiotic at home  - Recently admitted for IV antibiotics   - Surgical culture (great toe amputation)was positive for MRSA and enterococcus in November  - Dr. Lopez has been consulted in the emergency  - Started on IV Zyvox  -LPS consult pending  - Infectious disease consult pending          Type 2 diabetes mellitus (CMS-HCC)- (present on admission)   Assessment & Plan    Holding metformin  insulin sliding scale.  Hypoglycemia protocol        HTN (hypertension)- (present on admission)   Assessment & Plan    Continue Norvasc and Prinivil   Monitor Blood pressure        Atrial fibrillation (CMS-HCC)- (present on admission)   Assessment & Plan    Continue digoxin and metoprolol. Heart rate is controlled  Started IV heparin, hold xarelto  for now for possible surgery        HLD (hyperlipidemia)- (present on admission)   Assessment & Plan     continue home Pravachol           Quality-Core Measures

## 2018-02-18 NOTE — WOUND TEAM
TC with ELISA Mathur about pt's wound to L foot. Per ED MD note pt has been discussed with Dr Lopez. X-ray has been performed, MRI has been ordered. Discussed cleaning wound with NS, dry and cover with adhesive foam. Nsg to change daily. LPS to follow tomorrow.

## 2018-02-18 NOTE — ASSESSMENT & PLAN NOTE
Osteomyelitis and multiple right foot abscess , status post irrigation and debridement on February 19  -Staph aureus growing.  - Infectious disease consulted  -Had a reaction to Unasyn and vancomycin, currently on linezolid, aztreonem, Bactrim

## 2018-02-18 NOTE — PROGRESS NOTES
Orthopaedic PA Progress Note    Interval changes: Admitted overnight via ER for infected foot S/P Right 1st ray amputation Jan 17. Cuurently NPO for potential revision amputation surgery. Case discussed with Dr. Leo, on call for JULIENNE    ROS - Patient denies any new issues. No chest pain, dyspnea, or fever.  Pain well controlled.    Blood pressure (!) 166/88, pulse 62, temperature 37.3 °C (99.2 °F), resp. rate 16, height 1.829 m (6'), weight (!) 133.3 kg (293 lb 14 oz), SpO2 91 %.    Patient seen and examined  No acute distress  Breathing non labored  RRR  Surgical dressing is clean, dry, and intact.    Recent Labs      02/17/18   2245   WBC  11.4*   RBC  4.69*   HEMOGLOBIN  13.3*   HEMATOCRIT  40.1*   MCV  85.5   MCH  28.4   MCHC  33.2*   RDW  51.1*   PLATELETCT  297   MPV  10.1       Active Hospital Problems    Diagnosis   • Diabetic foot infection (CMS-HCC) [E11.69, L08.9]     Priority: High   • Type 2 diabetes mellitus (CMS-HCC) [E11.9]     Priority: Medium   • HLD (hyperlipidemia) [E78.5]     Priority: Low   • HTN (hypertension) [I10]   • Atrial fibrillation (CMS-HCC) [I48.91]       Assessment/Plan:  T2DM male, poor adherence to outpt medication, readmitted with R foot infection 31 days post-op  OR tomorrow - Dr Lopez will schedule  May have diabetic diet today with protein shakes    NPO p MN    Case Coordination for Discharge Planning - Disposition home on antibiotics anticipated

## 2018-02-18 NOTE — ASSESSMENT & PLAN NOTE
Continue digoxin and metoprolol. Heart rate is controlled  On admission xarelto was felt and started IV heparin for surgery  After surgery, started on subcu Lovenox  Follow up with surgery to see if they  plan any more procedure, and if not, restart Xarelto

## 2018-02-18 NOTE — PROGRESS NOTES
Received from ER via Milagros gardner, transfer over to bed, reviewed plan of care and NPO status and pt acknowledges plan. Skin assessment complete with Evangelina RN,  Wounds on dorsal and lateral area rt foot, pictures taken and uploaded to chart,  Dry calloused skin to lt foot, no open areas noted on lt foot otherwise skin intact

## 2018-02-18 NOTE — PROGRESS NOTES
0920 PTT 46.8, per protocol and second nurse  Increased dose. Pt educated and okay with new orders. Next PTT lab draw 1700.      1800 PTT 70 per protocol no change in heparin drip. Redraw PTT in 6 hours.

## 2018-02-18 NOTE — ED NOTES
.  Chief Complaint   Patient presents with   • Wound Check     Hx of diabetic ulcer to right foot with amputation in November 2017, right found red, open wound to amputation site     .BP (!) 176/96   Pulse 72   Temp 36.6 °C (97.8 °F)   Resp 14   Ht 1.829 m (6')   Wt (!) 133.8 kg (295 lb)   SpO2 96%   BMI 40.01 kg/m²     BIB EMS with above complaints, patient has home health RN 3x/week, last seen on Wednesday, dressing change done, when patient went to change dressign today noted worsening appearance of wound and large amount of drainage to old dressing, told by Home Health RN to come to ED to have wound checked, educated on triage process, placed in lobby, told to inform staff of any changes in condition.

## 2018-02-18 NOTE — PROGRESS NOTES
Per Wound nurse request via telephone order wound cleaned with NS, dry and cover with adhesive foam.

## 2018-02-19 LAB
ANION GAP SERPL CALC-SCNC: 5 MMOL/L (ref 0–11.9)
APTT PPP: 42.8 SEC (ref 24.7–36)
APTT PPP: 90.4 SEC (ref 24.7–36)
BUN SERPL-MCNC: 11 MG/DL (ref 8–22)
CALCIUM SERPL-MCNC: 8.6 MG/DL (ref 8.5–10.5)
CHLORIDE SERPL-SCNC: 104 MMOL/L (ref 96–112)
CO2 SERPL-SCNC: 27 MMOL/L (ref 20–33)
CREAT SERPL-MCNC: 0.59 MG/DL (ref 0.5–1.4)
ERYTHROCYTE [DISTWIDTH] IN BLOOD BY AUTOMATED COUNT: 49.8 FL (ref 35.9–50)
GLUCOSE BLD-MCNC: 105 MG/DL (ref 65–99)
GLUCOSE BLD-MCNC: 116 MG/DL (ref 65–99)
GLUCOSE BLD-MCNC: 120 MG/DL (ref 65–99)
GLUCOSE BLD-MCNC: 145 MG/DL (ref 65–99)
GLUCOSE SERPL-MCNC: 126 MG/DL (ref 65–99)
GRAM STN SPEC: NORMAL
HCT VFR BLD AUTO: 42.5 % (ref 42–52)
HGB BLD-MCNC: 14 G/DL (ref 14–18)
MCH RBC QN AUTO: 28.2 PG (ref 27–33)
MCHC RBC AUTO-ENTMCNC: 32.9 G/DL (ref 33.7–35.3)
MCV RBC AUTO: 85.5 FL (ref 81.4–97.8)
PLATELET # BLD AUTO: 267 K/UL (ref 164–446)
PMV BLD AUTO: 10.4 FL (ref 9–12.9)
POTASSIUM SERPL-SCNC: 3.8 MMOL/L (ref 3.6–5.5)
RBC # BLD AUTO: 4.97 M/UL (ref 4.7–6.1)
SIGNIFICANT IND 70042: NORMAL
SITE SITE: NORMAL
SODIUM SERPL-SCNC: 136 MMOL/L (ref 135–145)
SOURCE SOURCE: NORMAL
WBC # BLD AUTO: 9.9 K/UL (ref 4.8–10.8)

## 2018-02-19 PROCEDURE — 87106 FUNGI IDENTIFICATION YEAST: CPT

## 2018-02-19 PROCEDURE — 700111 HCHG RX REV CODE 636 W/ 250 OVERRIDE (IP): Performed by: PHYSICIAN ASSISTANT

## 2018-02-19 PROCEDURE — 80048 BASIC METABOLIC PNL TOTAL CA: CPT

## 2018-02-19 PROCEDURE — A9270 NON-COVERED ITEM OR SERVICE: HCPCS | Performed by: HOSPITALIST

## 2018-02-19 PROCEDURE — 700105 HCHG RX REV CODE 258: Performed by: INTERNAL MEDICINE

## 2018-02-19 PROCEDURE — 700102 HCHG RX REV CODE 250 W/ 637 OVERRIDE(OP)

## 2018-02-19 PROCEDURE — 85027 COMPLETE CBC AUTOMATED: CPT

## 2018-02-19 PROCEDURE — 501838 HCHG SUTURE GENERAL: Performed by: ORTHOPAEDIC SURGERY

## 2018-02-19 PROCEDURE — 87205 SMEAR GRAM STAIN: CPT

## 2018-02-19 PROCEDURE — A9270 NON-COVERED ITEM OR SERVICE: HCPCS | Performed by: INTERNAL MEDICINE

## 2018-02-19 PROCEDURE — 700102 HCHG RX REV CODE 250 W/ 637 OVERRIDE(OP): Performed by: INTERNAL MEDICINE

## 2018-02-19 PROCEDURE — 500891 HCHG PACK, ORTHO MAJOR: Performed by: ORTHOPAEDIC SURGERY

## 2018-02-19 PROCEDURE — A9270 NON-COVERED ITEM OR SERVICE: HCPCS

## 2018-02-19 PROCEDURE — 0KBV0ZZ EXCISION OF RIGHT FOOT MUSCLE, OPEN APPROACH: ICD-10-PCS | Performed by: ORTHOPAEDIC SURGERY

## 2018-02-19 PROCEDURE — 700102 HCHG RX REV CODE 250 W/ 637 OVERRIDE(OP): Performed by: HOSPITALIST

## 2018-02-19 PROCEDURE — 700111 HCHG RX REV CODE 636 W/ 250 OVERRIDE (IP)

## 2018-02-19 PROCEDURE — 87015 SPECIMEN INFECT AGNT CONCNTJ: CPT

## 2018-02-19 PROCEDURE — 85730 THROMBOPLASTIN TIME PARTIAL: CPT

## 2018-02-19 PROCEDURE — 87075 CULTR BACTERIA EXCEPT BLOOD: CPT

## 2018-02-19 PROCEDURE — 87070 CULTURE OTHR SPECIMN AEROBIC: CPT

## 2018-02-19 PROCEDURE — 87116 MYCOBACTERIA CULTURE: CPT

## 2018-02-19 PROCEDURE — 700111 HCHG RX REV CODE 636 W/ 250 OVERRIDE (IP): Performed by: INTERNAL MEDICINE

## 2018-02-19 PROCEDURE — 160002 HCHG RECOVERY MINUTES (STAT): Performed by: ORTHOPAEDIC SURGERY

## 2018-02-19 PROCEDURE — 700101 HCHG RX REV CODE 250: Performed by: INTERNAL MEDICINE

## 2018-02-19 PROCEDURE — 160035 HCHG PACU - 1ST 60 MINS PHASE I: Performed by: ORTHOPAEDIC SURGERY

## 2018-02-19 PROCEDURE — 700111 HCHG RX REV CODE 636 W/ 250 OVERRIDE (IP): Performed by: HOSPITALIST

## 2018-02-19 PROCEDURE — 87206 SMEAR FLUORESCENT/ACID STAI: CPT

## 2018-02-19 PROCEDURE — 99232 SBSQ HOSP IP/OBS MODERATE 35: CPT | Performed by: INTERNAL MEDICINE

## 2018-02-19 PROCEDURE — 160036 HCHG PACU - EA ADDL 30 MINS PHASE I: Performed by: ORTHOPAEDIC SURGERY

## 2018-02-19 PROCEDURE — 36415 COLL VENOUS BLD VENIPUNCTURE: CPT

## 2018-02-19 PROCEDURE — 700105 HCHG RX REV CODE 258

## 2018-02-19 PROCEDURE — 160048 HCHG OR STATISTICAL LEVEL 1-5: Performed by: ORTHOPAEDIC SURGERY

## 2018-02-19 PROCEDURE — 770006 HCHG ROOM/CARE - MED/SURG/GYN SEMI*

## 2018-02-19 PROCEDURE — 700101 HCHG RX REV CODE 250

## 2018-02-19 PROCEDURE — 160027 HCHG SURGERY MINUTES - 1ST 30 MINS LEVEL 2: Performed by: ORTHOPAEDIC SURGERY

## 2018-02-19 PROCEDURE — 82962 GLUCOSE BLOOD TEST: CPT | Mod: 91

## 2018-02-19 PROCEDURE — 87102 FUNGUS ISOLATION CULTURE: CPT

## 2018-02-19 PROCEDURE — 160009 HCHG ANES TIME/MIN: Performed by: ORTHOPAEDIC SURGERY

## 2018-02-19 RX ORDER — OXYCODONE HCL 5 MG/5 ML
SOLUTION, ORAL ORAL
Status: COMPLETED
Start: 2018-02-19 | End: 2018-02-19

## 2018-02-19 RX ORDER — LINEZOLID 600 MG/1
600 TABLET, FILM COATED ORAL TWICE DAILY
Status: DISCONTINUED | OUTPATIENT
Start: 2018-02-19 | End: 2018-02-25

## 2018-02-19 RX ORDER — MAGNESIUM HYDROXIDE 1200 MG/15ML
LIQUID ORAL
Status: DISCONTINUED | OUTPATIENT
Start: 2018-02-19 | End: 2018-02-19 | Stop reason: HOSPADM

## 2018-02-19 RX ORDER — SODIUM CHLORIDE 9 MG/ML
INJECTION, SOLUTION INTRAVENOUS
Status: COMPLETED
Start: 2018-02-19 | End: 2018-02-19

## 2018-02-19 RX ADMIN — FENTANYL CITRATE 25 MCG: 50 INJECTION, SOLUTION INTRAMUSCULAR; INTRAVENOUS at 13:40

## 2018-02-19 RX ADMIN — METOPROLOL TARTRATE 75 MG: 25 TABLET, FILM COATED ORAL at 21:16

## 2018-02-19 RX ADMIN — SODIUM CHLORIDE 1 G: 9 INJECTION, SOLUTION INTRAVENOUS at 21:23

## 2018-02-19 RX ADMIN — FAMOTIDINE 20 MG: 20 TABLET, FILM COATED ORAL at 21:17

## 2018-02-19 RX ADMIN — MORPHINE SULFATE 2 MG: 4 INJECTION INTRAVENOUS at 18:16

## 2018-02-19 RX ADMIN — OXYCODONE HYDROCHLORIDE 5 MG: 5 TABLET ORAL at 10:01

## 2018-02-19 RX ADMIN — ENOXAPARIN SODIUM 40 MG: 100 INJECTION SUBCUTANEOUS at 21:16

## 2018-02-19 RX ADMIN — DIGOXIN 250 MCG: 250 TABLET ORAL at 10:01

## 2018-02-19 RX ADMIN — SULFAMETHOXAZOLE AND TRIMETHOPRIM 1 TABLET: 800; 160 TABLET ORAL at 21:16

## 2018-02-19 RX ADMIN — DIPHENHYDRAMINE HYDROCHLORIDE 25 MG: 50 INJECTION, SOLUTION INTRAMUSCULAR; INTRAVENOUS at 23:55

## 2018-02-19 RX ADMIN — FENTANYL CITRATE 25 MCG: 50 INJECTION, SOLUTION INTRAMUSCULAR; INTRAVENOUS at 13:25

## 2018-02-19 RX ADMIN — FENTANYL CITRATE 25 MCG: 50 INJECTION, SOLUTION INTRAMUSCULAR; INTRAVENOUS at 13:15

## 2018-02-19 RX ADMIN — METOPROLOL TARTRATE 75 MG: 25 TABLET, FILM COATED ORAL at 10:01

## 2018-02-19 RX ADMIN — FAMOTIDINE 20 MG: 20 TABLET, FILM COATED ORAL at 10:01

## 2018-02-19 RX ADMIN — SODIUM CHLORIDE 1000 ML: 9 INJECTION, SOLUTION INTRAVENOUS at 05:16

## 2018-02-19 RX ADMIN — VANCOMYCIN HYDROCHLORIDE 3000 MG: 100 INJECTION, POWDER, LYOPHILIZED, FOR SOLUTION INTRAVENOUS at 00:12

## 2018-02-19 RX ADMIN — FENTANYL CITRATE 25 MCG: 50 INJECTION, SOLUTION INTRAMUSCULAR; INTRAVENOUS at 13:45

## 2018-02-19 RX ADMIN — AMLODIPINE BESYLATE 5 MG: 5 TABLET ORAL at 10:01

## 2018-02-19 RX ADMIN — OXYCODONE HYDROCHLORIDE 5 MG: 5 TABLET ORAL at 18:16

## 2018-02-19 RX ADMIN — DIPHENHYDRAMINE HYDROCHLORIDE 25 MG: 50 INJECTION, SOLUTION INTRAMUSCULAR; INTRAVENOUS at 05:24

## 2018-02-19 RX ADMIN — PRAVASTATIN SODIUM 20 MG: 10 TABLET ORAL at 21:16

## 2018-02-19 RX ADMIN — HEPARIN SODIUM 3800 UNITS: 1000 INJECTION, SOLUTION INTRAVENOUS; SUBCUTANEOUS at 01:49

## 2018-02-19 RX ADMIN — LINEZOLID 600 MG: 600 TABLET, FILM COATED ORAL at 18:16

## 2018-02-19 RX ADMIN — DIPHENHYDRAMINE HYDROCHLORIDE 25 MG: 50 INJECTION, SOLUTION INTRAMUSCULAR; INTRAVENOUS at 15:25

## 2018-02-19 RX ADMIN — SULFAMETHOXAZOLE AND TRIMETHOPRIM 1 TABLET: 800; 160 TABLET ORAL at 10:01

## 2018-02-19 RX ADMIN — OXYCODONE HYDROCHLORIDE 10 MG: 5 SOLUTION ORAL at 13:15

## 2018-02-19 ASSESSMENT — PAIN SCALES - GENERAL
PAINLEVEL_OUTOF10: 8
PAINLEVEL_OUTOF10: 5
PAINLEVEL_OUTOF10: 5
PAINLEVEL_OUTOF10: 3
PAINLEVEL_OUTOF10: 4
PAINLEVEL_OUTOF10: 0
PAINLEVEL_OUTOF10: 8
PAINLEVEL_OUTOF10: 4
PAINLEVEL_OUTOF10: 6
PAINLEVEL_OUTOF10: 0
PAINLEVEL_OUTOF10: 6
PAINLEVEL_OUTOF10: 0

## 2018-02-19 ASSESSMENT — ENCOUNTER SYMPTOMS
ABDOMINAL PAIN: 0
CHILLS: 0
NEUROLOGICAL NEGATIVE: 1
NAUSEA: 0
MYALGIAS: 1
DIARRHEA: 0
EYES NEGATIVE: 1
RESPIRATORY NEGATIVE: 1
PSYCHIATRIC NEGATIVE: 1
CARDIOVASCULAR NEGATIVE: 1
CONSTITUTIONAL NEGATIVE: 1
SHORTNESS OF BREATH: 0
VOMITING: 0
FEVER: 0
GASTROINTESTINAL NEGATIVE: 1

## 2018-02-19 NOTE — PROGRESS NOTES
MRI foot prelim read:  There is abnormal marrow signal consistent with edema in the first metatarsal stump. Findings are consistent with osteomyelitis. There is diffuse soft tissue swelling and gas present in the foot.    Attempts to convey these findings to Dr. Rowe were initiated on 2/18/2010 1812 hours.

## 2018-02-19 NOTE — DISCHARGE PLANNING
Transitional Care Navigator:    Per chart review patient was on service with [facility] . Anticipate patient will needs resume orders and F2F at minimum prior to DC home. TCN following as needed.

## 2018-02-19 NOTE — PROGRESS NOTES
"Pt arrived to floor from PACU. Pt A+Ox4, able to make needs known, PIV to Left forearm and Right hand, Patent. Pain 8/10 to Right foot.   CSMT's WNL with decrease sensation to surgical site. RAYMOND's WNL, SCD\"s declined at this time, Ice pack applied, elevation to RLE.  Post op protocol in place. Call light with in reach of pt, uses appropriately.    "

## 2018-02-19 NOTE — PROGRESS NOTES
Pharmacy Kinetics 53 y.o. male on vancomycin day # 1 2018    Currently on Vancomycin 3000 mg iv load x1    Indication for Treatment: Osteomyelitis     Pertinent history per medical record: Admitted on 2018 for Diabetic foot.    52 yo male with PMH of AFib, T2Dm, neuropathy, DLP, HTN,, obesity, & medication non-adherence, presents with worsening pain at R foot surgical site after great toe amputation in Nov.  Pt has (+) wound cultures for MRSA and was sent home on PO linezolid, but due to non-adherence, infection became worse.  Daptomycin was started in outpatient infusion services, but pt developed rash, and therapy was d/c'd.    During this hospital stay, pt was given Unasyn, but developed rash, so treatment was changed to Vancomycin.    Other antibiotics: Linezolid, Bactrim, Unasyn, Daptomycin in [facility]    Allergies: Daptomycin; Pcn [penicillins]; and Unasyn [ampicillin-sulbactam sodium]     List concerns for renal function:   -Obesity BMI ~40; BUN:SCr 27:1    Pertinent cultures to date:   : R Foot Bone: (+) Sindhu albicans, (+) MRSA, (+) E faecalis  : Blood x2: Neg      Recent Labs      18   2245   WBC  11.4*   NEUTSPOLYS  57.20     Recent Labs      18   2245   BUN  18   CREATININE  0.65     No results for input(s): VANCOTROUGH, VANCOPEAK, VANCORANDOM in the last 72 hours.  Intake/Output Summary (Last 24 hours) at 18 2150  Last data filed at 18 1800   Gross per 24 hour   Intake                0 ml   Output              720 ml   Net             -720 ml      Blood pressure 147/87, pulse 63, temperature 36.7 °C (98.1 °F), resp. rate 16, height 1.829 m (6'), weight (!) 133.3 kg (293 lb 14 oz), SpO2 91 %. Temp (24hrs), Av.8 °C (98.3 °F), Min:36.3 °C (97.4 °F), Max:37.3 °C (99.2 °F)      A/P   1. Vancomycin dose change: 2000 mg IV q12h start @1200   2. Next vancomycin level: 12-hour random (not ordered)  3. Goal trough: 12 to 16 mcg/mL  4. Comments: Due to pt's obesity  and risk of accumulation, will start maintenance dosing as noted above.  Rounding pharmacist will assess the vancomycin trough and adjust the maintenance dose as clinically appropriate.      Jose F Peña, XeniaD

## 2018-02-19 NOTE — PROGRESS NOTES
Pt c/o of redness and increased itching, eye swelling. Pt AOx4 able to make needs known. Denies any difficulty breathing, Vanco stopped, NS IVF running. oxygen 2 liters via NC in place.  in place. PRN Benadryl 25 mg IV given.  Eliseo Morse aware, Dr. Anderson paged and made aware, TO for Linezolid 600 mg by mouth twice daily ordered.  To be started this evening.  Pt aware.

## 2018-02-19 NOTE — PROGRESS NOTES
Received a call from Nurse around 9 20 pm regarding patient developing rash and possible tongue swelling. Concern for allergic reaction to Unasyn, penicillins are listed in his list of allergies. I will discontinue Unasyn and provide the patient with IV benadryl. We will monitor him closely for any worsening of rash or swelling. I will start him on Vancomycin at this time. Further antibiotics recommendations per ID.

## 2018-02-19 NOTE — OP REPORT
DATE OF SERVICE:  02/19/2018    PREOPERATIVE DIAGNOSIS:  Multiloculated abscess, right foot.    POSTOPERATIVE DIAGNOSIS:  Multiloculated abscess, right foot.    PROCEDURE PERFORMED:  Irrigation and debridement, multiloculated abscess,   right foot.    SURGEON:  Kirby Owens MD    ASSISTANT:  Satya Bynum PA-C    ESTIMATED BLOOD LOSS:  None.    INDICATIONS:  This is a 53-year-old gentleman who has had multiple foot   surgeries, who now has open draining purulent wound.  Risks and benefits of   irrigation and debridement were discussed at length, which include but not   limited to bleeding, infection, neurovascular damage, pain, stiffness, and   need for more proximal amputation.  He understands all these risks and wished   to proceed.    DESCRIPTION OF PROCEDURE:  The patient was sedated with LMA anesthesia and   administered perioperative antibiotics.  The right lower extremity was prepped   and draped in the usual sterile fashion.  His 2x3 cm draining wound was   excised and the multiple loculations were broken up and excised sharply with a   knife and rongeur along its 3x2 cm area.  Wounds were then irrigated with   copious amounts of normal saline solution and closed with nylon suture.    Sterile dressing was applied.  The patient tolerated the procedure well.    POSTOPERATIVE PLAN:  The patient will be admitted by the medicine service for   perioperative antibiotics, pain control while awaiting definitive cultures   from the OR.       ____________________________________     KIRBY OWENS MD    BI / NTS    DD:  02/19/2018 12:31:10  DT:  02/19/2018 13:13:46    D#:  [id number]  Job#:  [id number]

## 2018-02-19 NOTE — CARE PLAN
Problem: Communication  Goal: The ability to communicate needs accurately and effectively will improve  Outcome: PROGRESSING AS EXPECTED  Reoriented pt to environment as needed; white board updated with [facility] staff and return time. Pt notified of hourly rounding and unit routine.    Appropriate signs in place at doorway for pt. Pt allowed time to ask questions; no questions at this time.    Problem: Pain Management  Goal: Pain level will decrease to patient's comfort goal  Outcome: PROGRESSING AS EXPECTED  Pt assessed for pain level routinely. Pt educated about around-the-clock px management and non-pharmacologic techniques. Pt verbalized understanding. PRN Pain medication given per MAR.

## 2018-02-19 NOTE — PROGRESS NOTES
GERARD Morse TO to STOP Heparin drip for surgery at 1300. Pt aware and compliant with new orders.

## 2018-02-19 NOTE — PROGRESS NOTES
Infectious Disease Progress Note    Author: Jane Anderson M.D. Date & Time of service: 2018  2:33 PM    Chief Complaint:  FU right foot abscess    Interval History:   AF, WBc 9.9, had reaction to unasyn earlier today with rash, itching and tongue swelling, now on vancomycin, just returned from I&D, pain controlled, never had cephalosporins per pt  Labs Reviewed, Medications Reviewed, Radiology Reviewed and Wound Reviewed.    Review of Systems:  Review of Systems   Constitutional: Negative for chills and fever.   Respiratory: Negative for shortness of breath.    Gastrointestinal: Negative for abdominal pain, diarrhea, nausea and vomiting.   Musculoskeletal: Positive for joint pain and myalgias.   Skin: Positive for itching and rash.       Hemodynamics:  Temp (24hrs), Av.8 °C (98.3 °F), Min:36.4 °C (97.6 °F), Max:37.3 °C (99.1 °F)  Temperature: 36.8 °C (98.2 °F)  Pulse  Av  Min: 50  Max: 77Heart Rate (Monitored): 61  Blood Pressure: 141/53, NIBP: 103/52       Physical Exam:  Physical Exam   Constitutional: He is oriented to person, place, and time. He appears well-developed.   HENT:   Head: Normocephalic and atraumatic.   Eyes: EOM are normal. Pupils are equal, round, and reactive to light.   Neck: Neck supple.   Cardiovascular: Normal rate and regular rhythm.    Pulmonary/Chest: Effort normal and breath sounds normal.   Abdominal: Soft. There is no tenderness.   Musculoskeletal:   R foot in surgical dressing   Neurological: He is alert and oriented to person, place, and time.       Meds:    Current Facility-Administered Medications:   •  pravastatin  •  metoprolol  •  lisinopril  •  digoxin  •  aspirin  •  amLODIPine  •  senna-docusate **AND** polyethylene glycol/lytes **AND** magnesium hydroxide **AND** bisacodyl  •  NS  •  acetaminophen  •  Notify provider if pain remains uncontrolled **AND** Use the numeric rating scale (NRS-11) on regular floors and Critical-Care Pain Observation Tool  (CPOT) on ICUs/Trauma to assess pain **AND** Pulse Ox (Oximetry) **AND** [DISCONTINUED] Pharmacy Consult Request **AND** If patient difficult to arouse and/or has respiratory depression, stop any opiates that are currently infusing and call a Rapid Response. **AND** oxyCODONE immediate-release **AND** oxyCODONE immediate-release **AND** morphine injection  •  insulin regular **AND** Accu-Chek Q6 if NPO **AND** NOTIFY MD and PharmD **AND** glucose 4 g **AND** dextrose 50%  •  heparin **AND** heparin **AND** Action is required: Protocol 440 Heparin Weight Based has been implemented **AND** Protocol 440 Heparin Weight Based DO NOT GIVE ANY HEPARIN BOLUS TO STROKE PATIENT **AND** Protocol 440 Heparin Weight Based Discontinue Enoxaparin (Lovenox), Dabigatran (Pradaxa), Rivaroxaban (Xarelto), Apixaban (Eliquis), Edoxaban (Savaysa, Lixiana), Fondaparinux (Arixtra) and Argatroban prior to heparin administration **AND** Protocol 440 Heparin Weight Based Draw baseline aPTT, PT, and platelet count if not already done **AND** Protocol 440 Heparin Weight Based Draw aPTT 6 hours after beginning infusion.  **AND** Protocol 440 Heparin Weight Based Record Patient Data **AND** Protocol 440 Heparin Weight Based INSTRUCTIONS **AND** Protocol 440 Heparin Weight Based Review aPTT results 6 hours after infusion is begun as detailed **AND** Protocol 440 Heparin Weight Based Draw Platelet count every three days. Contact MD if platelet is 50% lower than baseline count. **AND** Protocol 440 Heparin Weight Based Adjust heparin to maintain aPTT between 55-96 sec **AND** Protocol 440 Heparin Weight Based Order aPTT 6 hours after any rate change or hold until aPTT is therapeutic (55-96 seconds) **AND** Protocol 440 Heparin Weight Based Documentation and verification  •  famotidine  •  sulfamethoxazole-trimethoprim  •  MD ALERT... vancomycin  •  diphenhydrAMINE  •  vancomycin    Labs:  Recent Labs      02/17/18   2245  02/19/18   0047   WBC   11.4*  9.9   RBC  4.69*  4.97   HEMOGLOBIN  13.3*  14.0   HEMATOCRIT  40.1*  42.5   MCV  85.5  85.5   MCH  28.4  28.2   RDW  51.1*  49.8   PLATELETCT  297  267   MPV  10.1  10.4   NEUTSPOLYS  57.20   --    LYMPHOCYTES  30.00   --    MONOCYTES  6.50   --    EOSINOPHILS  5.50   --    BASOPHILS  0.40   --      Recent Labs      02/17/18   2245  02/19/18   0047   SODIUM  137  136   POTASSIUM  3.8  3.8   CHLORIDE  101  104   CO2  26  27   GLUCOSE  160*  126*   BUN  18  11     Recent Labs      02/17/18   2245  02/19/18   0047   CREATININE  0.65  0.59       Imaging:  Dx-chest-for Picc Line Perform Procedure In: Patient's Room    Result Date: 1/20/2018 1/20/2018 11:02 PM HISTORY/REASON FOR EXAM:  PICC line placement. TECHNIQUE/EXAM DESCRIPTION AND NUMBER OF VIEWS: Single view of the chest. COMPARISON: 11/20/2017 FINDINGS: Cardiac mediastinal contour is unchanged. Lungs show hypoinflation. Pulmonary vessels are again mildly prominent. No pleural fluid collection or pneumothorax. LEFT arm PICC line in place with tip at the mid SVC. No major bony abnormality is seen.     1.  Supportive tubing as described above. 2.  No pneumonia or pneumothorax. 3.  Pulmonary vasculature is again mildly prominent.    Dx-chest-limited (1 View)    Result Date: 2/1/2018 2/1/2018 1:00 PM HISTORY/REASON FOR EXAM:  Chest Pain Right chest pain TECHNIQUE/EXAM DESCRIPTION AND NUMBER OF VIEWS: Single portable view of the chest. COMPARISON: 1/26/2018 FINDINGS: No pulmonary infiltrates or consolidations are noted. No pleural effusion. No pneumothorax. Stable enlarged cardiopericardial silhouette.     Stable cardiomegaly No acute cardiopulmonary abnormalities are identified.    Dx-chest-limited (1 View)    Addendum Date: 1/26/2018    Addendum: Left PICC line is seen terminating overlying the expected location of the proximal superior vena cava    Result Date: 1/26/2018 1/26/2018 3:16 AM HISTORY/REASON FOR EXAM:  PICC line placement TECHNIQUE/EXAM  DESCRIPTION:  Single AP view of the chest. COMPARISON: January 20, 2018 FINDINGS: Overlying cardiac leads are present. The heart is in the upper limits of normal for size. The mediastinal contour appears within normal limits.  The central pulmonary vasculature appears normal. The lungs appear well expanded bilaterally.  Bilateral lungs are clear. No significant pleural effusions are identified. The bony structures appear age-appropriate.     1.  No acute cardiopulmonary disease.    Dx-chest-portable (1 View)    Result Date: 2/8/2018 2/8/2018 2:57 PM HISTORY/REASON FOR EXAM:  Chest Pain Mid sternal chest pain. TECHNIQUE/EXAM DESCRIPTION AND NUMBER OF VIEWS: Single portable view of the chest. COMPARISON: 2/1/18 FINDINGS: No pulmonary infiltrates or consolidations are noted. No pleural effusion. No pneumothorax. Stable enlarged cardiopericardial silhouette.     1. Stable cardiomegaly No pulmonary infiltrates or consolidations are noted.    Dx-foot-complete 3+ Right    Result Date: 2/17/2018 2/17/2018 11:04 PM HISTORY/REASON FOR EXAM:  Right foot infection, open wound TECHNIQUE/EXAM DESCRIPTION AND NUMBER OF VIEWS: 3 views of the RIGHT foot. COMPARISON:  Right foot x-ray 2/2/2018 FINDINGS: There has been first transmetatarsal amputation. The stump appears within normal limits. There is adjacent soft tissue swelling. 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 There are subluxations at multiple metatarsophalangeal joint. There is spurring at the insertion of achilles' tendon and origin of plantar fascia.     1.  Prior first transmetatarsal amputation 2.  Midfoot osteoarthritis 3.  calcaneal spurring    Dx-foot-complete 3+ Right    Result Date: 2/2/2018 2/2/2018 4:40 AM HISTORY/REASON FOR EXAM:  Right foot pain.. TECHNIQUE/EXAM DESCRIPTION AND NUMBER OF VIEWS:  3 views of the RIGHT foot. COMPARISON: 1/15/2018 FINDINGS: There is evidence of amputation through the proximal shaft of the first metatarsal, which has occurred  since the prior images. There is generalized soft tissue swelling about the distal foot. No soft tissue gas however. No acute bony abnormality.     No acute bony abnormality.    Mr-foot-with & W/o Right    Result Date: 2/19/2018 2/18/2018 4:39 PM HISTORY/REASON FOR EXAM:  Open wound. Rt Foot pain, Open Wound Mid Foot TECHNIQUE/EXAM DESCRIPTION: MRI of the RIGHT foot with and without contrast. The study was performed on a Nighat 3.0 Taya MRI scanner. T1 axial, T1 sagittal, T1 coronal, STIR sagittal, T2 fast spin-echo fat-suppressed coronal, sagittal inversion recovery, and T1 postcontrast coronal images were obtained. 20 mL Omniscan contrast was administered intravenously. COMPARISON: 1/16/2018. FINDINGS: Interval further amputation of the first ray with small residual first metatarsal stump. There is a deep soft tissue ulcer at the amputation bed with extensive phlegmonous change. There are susceptibility artifact foci within the surgical bed which could relate to gas. There is tenosynovitis of the distal extensor hallucis longus underneath the ulcer. There is bone marrow replacement in the first metatarsal stump, consistent with osteomyelitis.     MR evidence of osteomyelitis within the first metatarsal stump. Deep soft tissue ulcer at the amputation bed with extensive phlegmonous change and possible gas. No discrete fluid collection seen. There is tenosynovitis of the distal extensor hallucis longus underneath the ulcer.     Echocardiogram-comp W/ Cont    Result Date: 1/27/2018  Transthoracic Echo Report Echocardiography Laboratory CONCLUSIONS Technically difficult but adequate study for interpretation. Contrast was used to enhance visualization of the endocardial border. Normal left ventricular chamber size. Normal left ventricular systolic function. Left ventricular ejection fraction is visually estimated to be 60%. Mild concentric left ventricular hypertrophy. Trace mitral regurgitation. Normal left atrial  size. Structurally normal aortic valve without significant stenosis or regurgitation. Mild tricuspid regurgitation. Estimated right ventricular systolic pressure  is 35 mmHg. Normal inferior vena cava size and inspiratory collapse. Normal right ventricular size and systolic function. EMELY FLORES Exam Date:         2018                    08:43 Exam Location:     Inpatient Priority:          Routine Ordering Physician:        FABIAN RAMIRES Referring Physician: Sonographer:               Sherry Funes RDCS Age:    52     Gender:    M MRN:    [id number] :    1965 BSA:    2.51   Ht (in):    72     Wt (lb):    295 Exam Type:     Complete, Contrast Indications:     Paroxysmal atrial fibrillation ICD Codes:       I480 CPT Codes:       60561,  BP:   145    /   101    HR:   110 Technical Quality:       Technically difficult study -                          adequate information is obtained MEASUREMENTS  (Male / Female) Normal Values 2D ECHO LV Diastolic Diameter PLAX        4.4 cm                4.2 - 5.9 / 3.9 - 5.3 cm LV Systolic Diameter PLAX         2.5 cm                2.1 - 4.0 cm IVS Diastolic Thickness           1.5 cm                LVPW Diastolic Thickness          1.1 cm                RV Diameter 4C                    3.2 cm                2.5 - 2.9 cm LVOT Diameter                     2.1 cm                RA Diameter                       3.6 cm                Estimated LV Ejection Fraction    60 %                  LV Ejection Fraction MOD BP       54.7 %                >= 55  % LV Ejection Fraction MOD 4C       64.7 %                LV Ejection Fraction MOD 2C       55.5 %                LA Volume Index                   28.8 cm³/m²           16 - 28 cm³/m² IVC Diameter                      1.5 cm                M-MODE Aortic Root Diameter MM           3.9 cm                DOPPLER AV Peak Velocity                  1.3 m/s               AV Peak Gradient                  7 mmHg                 AV Mean Gradient                  3.5 mmHg              LVOT Peak Velocity                1.2 m/s               AV Area Cont Eq vti               3 cm²                 TR Peak Velocity                  239 cm/s              PV Peak Velocity                  0.91 m/s              PV Peak Gradient                  3.3 mmHg              RVOT Peak Velocity                0.69 m/s              * Indicates values subject to auto-interpretation LV EF:  60    % FINDINGS Left Ventricle 3 mL of contrast was administered. Contrast was used to enhance visualization of the endocardial border. Existing IV was used. Normal left ventricular chamber size. Mild concentric left ventricular hypertrophy. Normal left ventricular systolic function. Left ventricular ejection fraction is visually estimated to be 60%. Diastolic function is difficult to assess with atrial fibrillation. Right Ventricle Normal right ventricular size and systolic function. Right Atrium Normal right atrial size. Normal inferior vena cava size and inspiratory collapse. Left Atrium Normal left atrial size. Mitral Valve Thickened mitral valve leaflets. Trace mitral regurgitation. Aortic Valve Structurally normal aortic valve without significant stenosis or regurgitation. Tricuspid Valve Mild tricuspid regurgitation. Estimated right ventricular systolic pressure  is 35 mmHg. Pulmonic Valve Structurally normal pulmonic valve without significant stenosis or regurgitation. Pericardium Normal pericardium without effusion. Aorta Aortic root dilatation measuring 3.9 cm. Rocio Ross M.D. (Electronically Signed) Final Date:     27 January 2018                 14:23    Le Venous Duplex (specify In Comments Left, Right Or Bilateral)    Result Date: 2/2/2018   Vascular Laboratory  CONCLUSIONS  Normal bilateral superficial and deep venous examination of the right leg.  EMELY FLORES  Exam Date:     02/02/2018 04:36  Room #:     Inpatient  Priority:     Routine   Ht (in):             Wt (lb):  Ordering Physician:        SHIRLEY WILLIAMSON  Referring Physician:       [id number] Sultana  Sonographer:               YOKASTA troncoso rvt  Study Type:  Technical Quality:         Adequate  Age:    52    Gender:     M  MRN:    [id number]  :    1965      BSA:  Indications:     RT LEG SWELLING  CPT Codes:  ICD Codes:  History:         Swelling of the rt limb  Limitations:     body habitus in calf  PROCEDURES:  rt lower ext venous doppler  FINDINGS:  Complete color filling and compressibility with normal venous flow dynamics  including spontaneous flow, response to augmentation maneuvers, and  respiratory phasicity.  The peroneal and posterior tibial veins are difficult to assess for  compressibility, but flow response to augmentation is demonstrated.  GSV DST THIGH Densely echogenic material is within  the vein that is  consistent with chronic venous thrombosis. FLOW IS SEEN  ___vein is partially compressible.  AREA OF PAIN - edema obscures the visualization of the  lat/post calf  Germain Craven  (Electronically Signed)  Final Date:      2018                   07:11    Nm-cardiac Stress Test    Addendum Date: 2018    Baseline ECG:atrial fibrillation Stress ECG: No ischemic T wave changes with peak stress Impression: Normal stress ECG, see nuclear images     Result Date: 2018   Myocardial Perfusion  Report  NUCLEAR IMAGING INTERPRETATION  No evidence of significant jeopardized viable myocardium or prior myocardial  infarction.  Normal left ventricular size, ejection fraction, and wall motion.  EMELY FLORES  MRN:    [id number]         Gender:    M  Exam Date: 2018 06:35  Exam Location:      Inpatient  Ordering Phys:     ALETHEA MASSEY  NucMed Tech:       Clarice Escoto RT  Age:    52    :    1965        Ht (in):     72  Wt (lb):     298    BMI:    40.37       Radiologist  Risk Factors:             Family history of coronary disease,  Diabetes,                            Hypertension  Indications:              Shortness of breath, Chest pain, unspecified  ICD Codes:                  R079  Cardiac History:          None reported  Cardiac Meds:  Meds Past 24 hrs:  Pretest Chest Pain:  STRESS TEST      Pharmacologjesse Agrawal   Lexiscan       Dose: 0.4 mg  ol:  Post-Injection Exercise:  Resting HR (bpm):      95  Peak HR (bpm):         109  Resting BP (mmHg):       122    /   77  Peak BP (mmHg):       131   /   92  MaxPHR:     166     Target HR (bpm):       141  % MaxPHR:     66  Double Product:       40281  BP Response:  Stress Termination:       Completed Protocol  Stress Symptoms:  Arrhythmia: None Chest Pain: None Dyspnea Flushing, Headache, Somach Cramps,  Leg Fatigue  ECG  Resting ECG:  Stress ECG:  IMAGE PROTOCOL      Rest/Stress 1                      Day          RadiopharmaceuticalDose (mCi)   Imaging  Date      Imaging  Time         Inj to Img Time (min)  Rest:   Tc-99m             7.4          29-Jan-2018        13:36          Tetrofosmin  Stress: Tc-99m             25.0         29-Jan-2018        14:27          Tetrofosmin  Rest:  Administration Site:       Left forearm  Administered by:      RT Rasheeda  Stress:  Administration Site:       Left forearm  Administered by:      Graeme Joaquin RN  % Percent HR Achieved:  SPECT RESULTS  Technical Quality:       Good  Raw Data Analysis:  Summed Stress Score:    2  Summed Rest Score:    2  Summed Difference Score:        2  PERFUSION:  Normal myocardial perfusion with no ischemia.  FUNCTIONAL RESULTS (calculated via Gated SPECT)  Stress Image LV EF:        64     %  Upper Normal Limit  Stress EDV:      92     ml   EDVI:    36      ml/m²  Stress ESV:      33     ml   ESVI:    13      ml/m²  TID:    0.96   TID - 1.19      TID (ed) - 1.23  LV Function:  Normal left ventricular wall motion.  LV ejection fraction = 64%.  Shmuel Rai  (Electronically Signed)   Final Date:      29 January 2018                   15:38    Ct-cta Chest Pulmonary Artery W/ Recons    Result Date: 2/2/2018 2/2/2018 4:04 AM HISTORY/REASON FOR EXAM:  Pain TECHNIQUE/EXAM DESCRIPTION: CT angiogram scan for pulmonary embolism with contrast, with reconstructions. 1.25 mm helical sections were obtained from the lung apices through the lung bases following the rapid bolus administration of 90 mL of Omnipaque 350 nonionic contrast. Thin-section overlapping reconstruction interval was utilized. Coronal reconstructions were generated from the axial data. MIP post processing was performed and utilized for the interpretation. Low dose optimization technique was utilized for this CT exam including automated exposure control and adjustment of the mA and/or kV according to patient size. COMPARISON: None FINDINGS: Pulmonary Embolism: No. Lungs: No suspicious nodules or air space process. Pleura: No pleural effusion. Nodes: No enlarged lymph nodes. Additional findings: There are multiple focal gas lucencies within the gallbladder, consistent with known opaque gallstones. Localized increased opacity is seen in the retroareolar regions of both breasts, consistent with gynecomastia..     1.  No evidence of pulmonary emboli or other acute intrathoracic abnormality. 2.  Cholelithiasis. 3.  Gynecomastia.       Micro:  Results     Procedure Component Value Units Date/Time    CULTURE TISSUE W/ GRM STAIN [[id number]] Collected:  02/19/18 1223    Order Status:  Completed Specimen:  Other Updated:  02/19/18 1405    ANAEROBIC CULTURE [[id number]] Collected:  02/19/18 1223    Order Status:  Completed Specimen:  Other Updated:  02/19/18 1405    FUNGAL CULTURE [[id number]] Collected:  02/19/18 1223    Order Status:  Completed Specimen:  Other Updated:  02/19/18 1405    AFB CULTURE [[id number]] Collected:  02/19/18 1223    Order Status:  Completed Specimen:  Other Updated:  02/19/18 1405    GRAM STAIN [[id number]] Collected:   "02/17/18 2236    Order Status:  Completed Specimen:  Wound Updated:  02/18/18 1805     Significant Indicator .     Source WND     Site RIGHT FOOT     Gram Stain Result --     Few WBCs.  Moderate Gram positive cocci.      BLOOD CULTURE [[id number]] Collected:  02/17/18 2245    Order Status:  Completed Specimen:  Blood from Peripheral Updated:  02/18/18 1048     Significant Indicator NEG     Source BLD     Site PERIPHERAL     Blood Culture --     No Growth    Note: Blood cultures are incubated for 5 days and  are monitored continuously.Positive blood cultures  are called to the RN and reported as soon as  they are identified.      Narrative:       Per Hospital Policy: Only change Specimen Src: to \"Line\" if  specified by physician order.    BLOOD CULTURE [[id number]] Collected:  02/17/18 2316    Order Status:  Completed Specimen:  Blood from Peripheral Updated:  02/18/18 1048     Significant Indicator NEG     Source BLD     Site PERIPHERAL     Blood Culture --     No Growth    Note: Blood cultures are incubated for 5 days and  are monitored continuously.Positive blood cultures  are called to the RN and reported as soon as  they are identified.      Narrative:       Per Hospital Policy: Only change Specimen Src: to \"Line\" if  specified by physician order.    CULTURE WOUND W/ GRAM STAIN [[id number]] Collected:  02/17/18 2236    Order Status:  Completed Specimen:  Blood from Right Foot Updated:  02/17/18 2340          Assessment:  Active Hospital Problems    Diagnosis   • *Diabetic foot infection (CMS-HCC) [E11.69, L08.9]   • Type 2 diabetes mellitus (CMS-[facility]) [E11.9]   • HLD (hyperlipidemia) [E78.5]   • HTN (hypertension) [I10]   • Atrial fibrillation (CMS-[facility]) [I48.91]       Plan:  Right foot abscess - ongoing work up  Afebrile  Leukocytosis resolved  S/p I&D on 2/19. Multiple loculations were noted intraop  OR cx pending  Wound cx - Staph aureus and proteus  Prior cx +MRSA, efaecalis  Start aztreonam pending further " susceptibility  Continue vancomycin  Monitor vanco trough and renal function    Drug rash and tongue swelling  2/2 unasyn  DC'd unasyn  Now on vanco    DM2  hgA1c 6.8  Maintain BS less than 150 to control infection      Discussed with internal medicine RN.

## 2018-02-19 NOTE — PROGRESS NOTES
Pt A&O x 4. PRN pain medication given per MAR. Pt on RA with no shortness of breath. NPO since 0000 due potential surgery on 2/19; pt educated with no questions at this time. R foot dressing DCI. Pt uses call light appropriately. Personal belongings and call light within reach. Hospitalist paged in regards to possible allergic reaction to IV ABX Unasyn. New orders placed by Dr. Navas; see progress notes. Heparin drip running per MAR; Heparin weight-based protocol followed. Discussed POC, safety, and mobility; pt has no questions at this time. Bed in lowest position with treaded socks on pt. Hourly rounding in place.

## 2018-02-19 NOTE — PROGRESS NOTES
Order for MRI. Eliseo rivas 'sho Heparin drip to be placed on stop for MRI. To be restarted when back to floor.

## 2018-02-19 NOTE — PROGRESS NOTES
Hospitalist paged in regards to increasing redness, itching and swelling. New orders placed by Dr. Navas . Unasyn DC'd by Dr. Navas due to suspected allergic reaction. IV benadryl ordered and given per MAR; pt verbalized relief. Will continue to monitor airway and any s/s of increased allergic reaction.

## 2018-02-20 LAB
BACTERIA WND AEROBE CULT: ABNORMAL
GLUCOSE BLD-MCNC: 109 MG/DL (ref 65–99)
GLUCOSE BLD-MCNC: 110 MG/DL (ref 65–99)
GLUCOSE BLD-MCNC: 120 MG/DL (ref 65–99)
GLUCOSE BLD-MCNC: 122 MG/DL (ref 65–99)
GRAM STN SPEC: ABNORMAL
RHODAMINE-AURAMINE STN SPEC: NORMAL
SIGNIFICANT IND 70042: ABNORMAL
SIGNIFICANT IND 70042: NORMAL
SITE SITE: ABNORMAL
SITE SITE: NORMAL
SOURCE SOURCE: ABNORMAL
SOURCE SOURCE: NORMAL

## 2018-02-20 PROCEDURE — 700101 HCHG RX REV CODE 250: Performed by: INTERNAL MEDICINE

## 2018-02-20 PROCEDURE — 700111 HCHG RX REV CODE 636 W/ 250 OVERRIDE (IP): Performed by: PHYSICIAN ASSISTANT

## 2018-02-20 PROCEDURE — 700102 HCHG RX REV CODE 250 W/ 637 OVERRIDE(OP): Performed by: INTERNAL MEDICINE

## 2018-02-20 PROCEDURE — 700102 HCHG RX REV CODE 250 W/ 637 OVERRIDE(OP): Performed by: HOSPITALIST

## 2018-02-20 PROCEDURE — 700105 HCHG RX REV CODE 258: Performed by: INTERNAL MEDICINE

## 2018-02-20 PROCEDURE — 700111 HCHG RX REV CODE 636 W/ 250 OVERRIDE (IP): Performed by: INTERNAL MEDICINE

## 2018-02-20 PROCEDURE — 82962 GLUCOSE BLOOD TEST: CPT

## 2018-02-20 PROCEDURE — A9270 NON-COVERED ITEM OR SERVICE: HCPCS | Performed by: HOSPITALIST

## 2018-02-20 PROCEDURE — A9270 NON-COVERED ITEM OR SERVICE: HCPCS | Performed by: INTERNAL MEDICINE

## 2018-02-20 PROCEDURE — 99232 SBSQ HOSP IP/OBS MODERATE 35: CPT | Performed by: INTERNAL MEDICINE

## 2018-02-20 PROCEDURE — 770021 HCHG ROOM/CARE - ISO PRIVATE

## 2018-02-20 RX ORDER — PROCHLORPERAZINE MALEATE 10 MG
5 TABLET ORAL EVERY 6 HOURS PRN
Status: DISCONTINUED | OUTPATIENT
Start: 2018-02-20 | End: 2018-02-26 | Stop reason: HOSPADM

## 2018-02-20 RX ORDER — ONDANSETRON 2 MG/ML
4 INJECTION INTRAMUSCULAR; INTRAVENOUS EVERY 6 HOURS PRN
Status: DISCONTINUED | OUTPATIENT
Start: 2018-02-20 | End: 2018-02-26 | Stop reason: HOSPADM

## 2018-02-20 RX ORDER — ONDANSETRON 4 MG/1
4 TABLET, ORALLY DISINTEGRATING ORAL EVERY 6 HOURS PRN
Status: DISCONTINUED | OUTPATIENT
Start: 2018-02-20 | End: 2018-02-26 | Stop reason: HOSPADM

## 2018-02-20 RX ORDER — CIPROFLOXACIN 500 MG/1
500 TABLET, FILM COATED ORAL EVERY 12 HOURS
Status: DISCONTINUED | OUTPATIENT
Start: 2018-02-20 | End: 2018-02-20

## 2018-02-20 RX ORDER — CIPROFLOXACIN 500 MG/1
500 TABLET, FILM COATED ORAL 2 TIMES DAILY WITH MEALS
Status: DISCONTINUED | OUTPATIENT
Start: 2018-02-20 | End: 2018-02-25

## 2018-02-20 RX ORDER — DEXTROSE MONOHYDRATE 25 G/50ML
25 INJECTION, SOLUTION INTRAVENOUS
Status: DISCONTINUED | OUTPATIENT
Start: 2018-02-20 | End: 2018-02-26 | Stop reason: HOSPADM

## 2018-02-20 RX ADMIN — LINEZOLID 600 MG: 600 TABLET, FILM COATED ORAL at 09:03

## 2018-02-20 RX ADMIN — PRAVASTATIN SODIUM 20 MG: 10 TABLET ORAL at 20:50

## 2018-02-20 RX ADMIN — OXYCODONE HYDROCHLORIDE 5 MG: 5 TABLET ORAL at 05:53

## 2018-02-20 RX ADMIN — PROCHLORPERAZINE EDISYLATE 5 MG: 5 INJECTION INTRAMUSCULAR; INTRAVENOUS at 21:46

## 2018-02-20 RX ADMIN — OXYCODONE HYDROCHLORIDE 5 MG: 5 TABLET ORAL at 09:03

## 2018-02-20 RX ADMIN — FAMOTIDINE 20 MG: 20 TABLET, FILM COATED ORAL at 20:51

## 2018-02-20 RX ADMIN — METOPROLOL TARTRATE 75 MG: 25 TABLET, FILM COATED ORAL at 09:03

## 2018-02-20 RX ADMIN — OXYCODONE HYDROCHLORIDE 5 MG: 5 TABLET ORAL at 00:02

## 2018-02-20 RX ADMIN — DIGOXIN 250 MCG: 250 TABLET ORAL at 09:02

## 2018-02-20 RX ADMIN — AMLODIPINE BESYLATE 5 MG: 5 TABLET ORAL at 09:03

## 2018-02-20 RX ADMIN — FAMOTIDINE 20 MG: 20 TABLET, FILM COATED ORAL at 09:02

## 2018-02-20 RX ADMIN — ASPIRIN 81 MG: 81 TABLET, COATED ORAL at 09:03

## 2018-02-20 RX ADMIN — ONDANSETRON 4 MG: 4 TABLET, ORALLY DISINTEGRATING ORAL at 16:39

## 2018-02-20 RX ADMIN — SODIUM CHLORIDE 1 G: 9 INJECTION, SOLUTION INTRAVENOUS at 09:11

## 2018-02-20 RX ADMIN — METOPROLOL TARTRATE 75 MG: 25 TABLET, FILM COATED ORAL at 20:50

## 2018-02-20 RX ADMIN — ENOXAPARIN SODIUM 40 MG: 100 INJECTION SUBCUTANEOUS at 20:54

## 2018-02-20 RX ADMIN — OXYCODONE HYDROCHLORIDE 5 MG: 5 TABLET ORAL at 21:46

## 2018-02-20 RX ADMIN — LINEZOLID 600 MG: 600 TABLET, FILM COATED ORAL at 20:51

## 2018-02-20 RX ADMIN — LISINOPRIL 40 MG: 20 TABLET ORAL at 09:02

## 2018-02-20 RX ADMIN — CIPROFLOXACIN HYDROCHLORIDE 500 MG: 500 TABLET, FILM COATED ORAL at 13:04

## 2018-02-20 RX ADMIN — SULFAMETHOXAZOLE AND TRIMETHOPRIM 1 TABLET: 800; 160 TABLET ORAL at 20:51

## 2018-02-20 RX ADMIN — ENOXAPARIN SODIUM 40 MG: 100 INJECTION SUBCUTANEOUS at 09:01

## 2018-02-20 RX ADMIN — SULFAMETHOXAZOLE AND TRIMETHOPRIM 1 TABLET: 800; 160 TABLET ORAL at 09:03

## 2018-02-20 RX ADMIN — OXYCODONE HYDROCHLORIDE 5 MG: 5 TABLET ORAL at 12:56

## 2018-02-20 ASSESSMENT — PATIENT HEALTH QUESTIONNAIRE - PHQ9
SUM OF ALL RESPONSES TO PHQ QUESTIONS 1-9: 0
SUM OF ALL RESPONSES TO PHQ9 QUESTIONS 1 AND 2: 0
1. LITTLE INTEREST OR PLEASURE IN DOING THINGS: NOT AT ALL
2. FEELING DOWN, DEPRESSED, IRRITABLE, OR HOPELESS: NOT AT ALL

## 2018-02-20 ASSESSMENT — ENCOUNTER SYMPTOMS
NAUSEA: 0
EYES NEGATIVE: 1
ABDOMINAL PAIN: 0
SHORTNESS OF BREATH: 0
PALPITATIONS: 0
PSYCHIATRIC NEGATIVE: 1
VOMITING: 0
WEIGHT LOSS: 0
GASTROINTESTINAL NEGATIVE: 1
FEVER: 0
HEARTBURN: 0
DIARRHEA: 0
CHILLS: 0
MYALGIAS: 1
CONSTIPATION: 0
ORTHOPNEA: 0
NEUROLOGICAL NEGATIVE: 1
CARDIOVASCULAR NEGATIVE: 1
CONSTITUTIONAL NEGATIVE: 1
CLAUDICATION: 0
PHOTOPHOBIA: 0
RESPIRATORY NEGATIVE: 1
EYE PAIN: 0
SPUTUM PRODUCTION: 0

## 2018-02-20 ASSESSMENT — LIFESTYLE VARIABLES: DO YOU DRINK ALCOHOL: NO

## 2018-02-20 ASSESSMENT — PAIN SCALES - GENERAL
PAINLEVEL_OUTOF10: 4
PAINLEVEL_OUTOF10: 6
PAINLEVEL_OUTOF10: 6
PAINLEVEL_OUTOF10: 7
PAINLEVEL_OUTOF10: 8
PAINLEVEL_OUTOF10: 5

## 2018-02-20 NOTE — PROGRESS NOTES
Infectious Disease Progress Note    Author: Jane Anderson M.D. Date & Time of service: 2018  12:27 PM    Chief Complaint:  FU right foot abscess    Interval History:   AF, WBc 9.9, had reaction to unasyn earlier today with rash, itching and tongue swelling, now on vancomycin, just returned from I&D, pain controlled, never had cephalosporins per pt   AF, no CBC, had rash, itching and tongue swelling with vanco, abx changed to linezolid which he is tolerating, also tolerating aztreonam  Labs Reviewed, Medications Reviewed, Radiology Reviewed and Wound Reviewed.    Review of Systems:  Review of Systems   Constitutional: Negative for chills and fever.   Respiratory: Negative for shortness of breath.    Gastrointestinal: Negative for abdominal pain, diarrhea, nausea and vomiting.   Musculoskeletal: Positive for joint pain and myalgias.   Skin: Positive for itching and rash.       Hemodynamics:  Temp (24hrs), Av.8 °C (98.2 °F), Min:36.2 °C (97.1 °F), Max:37 °C (98.6 °F)  Temperature: 36.2 °C (97.1 °F)  Pulse  Av.6  Min: 50  Max: 77Heart Rate (Monitored): 61  Blood Pressure: 136/70, NIBP: 103/52       Physical Exam:  Physical Exam   Constitutional: He is oriented to person, place, and time. He appears well-developed.   HENT:   Head: Normocephalic and atraumatic.   Eyes: EOM are normal. Pupils are equal, round, and reactive to light.   Neck: Neck supple.   Cardiovascular: Normal rate.    IRR   Pulmonary/Chest: Effort normal and breath sounds normal.   Abdominal: Soft. There is no tenderness.   Musculoskeletal:   R foot in surgical dressing   Neurological: He is alert and oriented to person, place, and time.       Meds:    Current Facility-Administered Medications:   •  insulin regular **AND** [CANCELED] Accu-Chek Q6 if NPO **AND** NOTIFY MD and PharmD **AND** glucose 4 g **AND** dextrose 50%  •  aztreonam (AZACTAM) IV  •  enoxaparin (LOVENOX) injection  •  linezolid  •  pravastatin  •   metoprolol  •  lisinopril  •  digoxin  •  aspirin  •  amLODIPine  •  senna-docusate **AND** polyethylene glycol/lytes **AND** magnesium hydroxide **AND** bisacodyl  •  NS  •  acetaminophen  •  Notify provider if pain remains uncontrolled **AND** Use the numeric rating scale (NRS-11) on regular floors and Critical-Care Pain Observation Tool (CPOT) on ICUs/Trauma to assess pain **AND** Pulse Ox (Oximetry) **AND** [DISCONTINUED] Pharmacy Consult Request **AND** If patient difficult to arouse and/or has respiratory depression, stop any opiates that are currently infusing and call a Rapid Response. **AND** oxyCODONE immediate-release **AND** oxyCODONE immediate-release **AND** morphine injection  •  famotidine  •  sulfamethoxazole-trimethoprim  •  diphenhydrAMINE    Labs:  Recent Labs      02/17/18 2245 02/19/18   0047   WBC  11.4*  9.9   RBC  4.69*  4.97   HEMOGLOBIN  13.3*  14.0   HEMATOCRIT  40.1*  42.5   MCV  85.5  85.5   MCH  28.4  28.2   RDW  51.1*  49.8   PLATELETCT  297  267   MPV  10.1  10.4   NEUTSPOLYS  57.20   --    LYMPHOCYTES  30.00   --    MONOCYTES  6.50   --    EOSINOPHILS  5.50   --    BASOPHILS  0.40   --      Recent Labs      02/17/18 2245  02/19/18   0047   SODIUM  137  136   POTASSIUM  3.8  3.8   CHLORIDE  101  104   CO2  26  27   GLUCOSE  160*  126*   BUN  18  11     Recent Labs      02/17/18 2245 02/19/18   0047   CREATININE  0.65  0.59       Imaging:  Dx-chest-for Picc Line Perform Procedure In: Patient's Room    Result Date: 1/20/2018 1/20/2018 11:02 PM HISTORY/REASON FOR EXAM:  PICC line placement. TECHNIQUE/EXAM DESCRIPTION AND NUMBER OF VIEWS: Single view of the chest. COMPARISON: 11/20/2017 FINDINGS: Cardiac mediastinal contour is unchanged. Lungs show hypoinflation. Pulmonary vessels are again mildly prominent. No pleural fluid collection or pneumothorax. LEFT arm PICC line in place with tip at the mid SVC. No major bony abnormality is seen.     1.  Supportive tubing as described  above. 2.  No pneumonia or pneumothorax. 3.  Pulmonary vasculature is again mildly prominent.    Dx-chest-limited (1 View)    Result Date: 2/1/2018 2/1/2018 1:00 PM HISTORY/REASON FOR EXAM:  Chest Pain Right chest pain TECHNIQUE/EXAM DESCRIPTION AND NUMBER OF VIEWS: Single portable view of the chest. COMPARISON: 1/26/2018 FINDINGS: No pulmonary infiltrates or consolidations are noted. No pleural effusion. No pneumothorax. Stable enlarged cardiopericardial silhouette.     Stable cardiomegaly No acute cardiopulmonary abnormalities are identified.    Dx-chest-limited (1 View)    Addendum Date: 1/26/2018    Addendum: Left PICC line is seen terminating overlying the expected location of the proximal superior vena cava    Result Date: 1/26/2018 1/26/2018 3:16 AM HISTORY/REASON FOR EXAM:  PICC line placement TECHNIQUE/EXAM DESCRIPTION:  Single AP view of the chest. COMPARISON: January 20, 2018 FINDINGS: Overlying cardiac leads are present. The heart is in the upper limits of normal for size. The mediastinal contour appears within normal limits.  The central pulmonary vasculature appears normal. The lungs appear well expanded bilaterally.  Bilateral lungs are clear. No significant pleural effusions are identified. The bony structures appear age-appropriate.     1.  No acute cardiopulmonary disease.    Dx-chest-portable (1 View)    Result Date: 2/8/2018 2/8/2018 2:57 PM HISTORY/REASON FOR EXAM:  Chest Pain Mid sternal chest pain. TECHNIQUE/EXAM DESCRIPTION AND NUMBER OF VIEWS: Single portable view of the chest. COMPARISON: 2/1/18 FINDINGS: No pulmonary infiltrates or consolidations are noted. No pleural effusion. No pneumothorax. Stable enlarged cardiopericardial silhouette.     1. Stable cardiomegaly No pulmonary infiltrates or consolidations are noted.    Dx-foot-complete 3+ Right    Result Date: 2/17/2018 2/17/2018 11:04 PM HISTORY/REASON FOR EXAM:  Right foot infection, open wound TECHNIQUE/EXAM DESCRIPTION AND  NUMBER OF VIEWS: 3 views of the RIGHT foot. COMPARISON:  Right foot x-ray 2/2/2018 FINDINGS: There has been first transmetatarsal amputation. The stump appears within normal limits. There is adjacent soft tissue swelling. 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 There are subluxations at multiple metatarsophalangeal joint. There is spurring at the insertion of achilles' tendon and origin of plantar fascia.     1.  Prior first transmetatarsal amputation 2.  Midfoot osteoarthritis 3.  calcaneal spurring    Dx-foot-complete 3+ Right    Result Date: 2/2/2018 2/2/2018 4:40 AM HISTORY/REASON FOR EXAM:  Right foot pain.. TECHNIQUE/EXAM DESCRIPTION AND NUMBER OF VIEWS:  3 views of the RIGHT foot. COMPARISON: 1/15/2018 FINDINGS: There is evidence of amputation through the proximal shaft of the first metatarsal, which has occurred since the prior images. There is generalized soft tissue swelling about the distal foot. No soft tissue gas however. No acute bony abnormality.     No acute bony abnormality.    Mr-foot-with & W/o Right    Result Date: 2/19/2018 2/18/2018 4:39 PM HISTORY/REASON FOR EXAM:  Open wound. Rt Foot pain, Open Wound Mid Foot TECHNIQUE/EXAM DESCRIPTION: MRI of the RIGHT foot with and without contrast. The study was performed on a Nighat 3.0 Taya MRI scanner. T1 axial, T1 sagittal, T1 coronal, STIR sagittal, T2 fast spin-echo fat-suppressed coronal, sagittal inversion recovery, and T1 postcontrast coronal images were obtained. 20 mL Omniscan contrast was administered intravenously. COMPARISON: 1/16/2018. FINDINGS: Interval further amputation of the first ray with small residual first metatarsal stump. There is a deep soft tissue ulcer at the amputation bed with extensive phlegmonous change. There are susceptibility artifact foci within the surgical bed which could relate to gas. There is tenosynovitis of the distal extensor hallucis longus underneath the ulcer. There is bone marrow replacement in the first  metatarsal stump, consistent with osteomyelitis.     MR evidence of osteomyelitis within the first metatarsal stump. Deep soft tissue ulcer at the amputation bed with extensive phlegmonous change and possible gas. No discrete fluid collection seen. There is tenosynovitis of the distal extensor hallucis longus underneath the ulcer.     Echocardiogram-comp W/ Cont    Result Date: 2018  Transthoracic Echo Report Echocardiography Laboratory CONCLUSIONS Technically difficult but adequate study for interpretation. Contrast was used to enhance visualization of the endocardial border. Normal left ventricular chamber size. Normal left ventricular systolic function. Left ventricular ejection fraction is visually estimated to be 60%. Mild concentric left ventricular hypertrophy. Trace mitral regurgitation. Normal left atrial size. Structurally normal aortic valve without significant stenosis or regurgitation. Mild tricuspid regurgitation. Estimated right ventricular systolic pressure  is 35 mmHg. Normal inferior vena cava size and inspiratory collapse. Normal right ventricular size and systolic function. EMELY FLORES Exam Date:         2018                    08:43 Exam Location:     Inpatient Priority:          Routine Ordering Physician:        FABIAN RAMIRES Referring Physician: Sonographer:               Sherry Funes RDCS Age:    52     Gender:    M MRN:    [id number] :    1965 BSA:    2.51   Ht (in):    72     Wt (lb):    295 Exam Type:     Complete, Contrast Indications:     Paroxysmal atrial fibrillation ICD Codes:       I480 CPT Codes:       13011,  BP:   145    /   101    HR:   110 Technical Quality:       Technically difficult study -                          adequate information is obtained MEASUREMENTS  (Male / Female) Normal Values 2D ECHO LV Diastolic Diameter PLAX        4.4 cm                4.2 - 5.9 / 3.9 - 5.3 cm LV Systolic Diameter PLAX         2.5 cm                2.1 - 4.0  cm IVS Diastolic Thickness           1.5 cm                LVPW Diastolic Thickness          1.1 cm                RV Diameter 4C                    3.2 cm                2.5 - 2.9 cm LVOT Diameter                     2.1 cm                RA Diameter                       3.6 cm                Estimated LV Ejection Fraction    60 %                  LV Ejection Fraction MOD BP       54.7 %                >= 55  % LV Ejection Fraction MOD 4C       64.7 %                LV Ejection Fraction MOD 2C       55.5 %                LA Volume Index                   28.8 cm³/m²           16 - 28 cm³/m² IVC Diameter                      1.5 cm                M-MODE Aortic Root Diameter MM           3.9 cm                DOPPLER AV Peak Velocity                  1.3 m/s               AV Peak Gradient                  7 mmHg                AV Mean Gradient                  3.5 mmHg              LVOT Peak Velocity                1.2 m/s               AV Area Cont Eq vti               3 cm²                 TR Peak Velocity                  239 cm/s              PV Peak Velocity                  0.91 m/s              PV Peak Gradient                  3.3 mmHg              RVOT Peak Velocity                0.69 m/s              * Indicates values subject to auto-interpretation LV EF:  60    % FINDINGS Left Ventricle 3 mL of contrast was administered. Contrast was used to enhance visualization of the endocardial border. Existing IV was used. Normal left ventricular chamber size. Mild concentric left ventricular hypertrophy. Normal left ventricular systolic function. Left ventricular ejection fraction is visually estimated to be 60%. Diastolic function is difficult to assess with atrial fibrillation. Right Ventricle Normal right ventricular size and systolic function. Right Atrium Normal right atrial size. Normal inferior vena cava size and inspiratory collapse. Left Atrium Normal left atrial size. Mitral Valve Thickened mitral valve  leaflets. Trace mitral regurgitation. Aortic Valve Structurally normal aortic valve without significant stenosis or regurgitation. Tricuspid Valve Mild tricuspid regurgitation. Estimated right ventricular systolic pressure  is 35 mmHg. Pulmonic Valve Structurally normal pulmonic valve without significant stenosis or regurgitation. Pericardium Normal pericardium without effusion. Aorta Aortic root dilatation measuring 3.9 cm. Rocio Ross M.D. (Electronically Signed) Final Date:     2018                 14:23    Le Venous Duplex (specify In Comments Left, Right Or Bilateral)    Result Date: 2018   Vascular Laboratory  CONCLUSIONS  Normal bilateral superficial and deep venous examination of the right leg.  MARTHAEMELY JUÁREZ  Exam Date:     2018 04:36  Room #:     Inpatient  Priority:     Routine  Ht (in):             Wt (lb):  Ordering Physician:        SHIRLEY WILLIAMSON  Referring Physician:       [id number] Sultana  Sonographer:               YOKASTA troncoso rvt  Study Type:  Technical Quality:         Adequate  Age:    52    Gender:     M  MRN:    [id number]  :    1965      BSA:  Indications:     RT LEG SWELLING  CPT Codes:  ICD Codes:  History:         Swelling of the rt limb  Limitations:     body habitus in calf  PROCEDURES:  rt lower ext venous doppler  FINDINGS:  Complete color filling and compressibility with normal venous flow dynamics  including spontaneous flow, response to augmentation maneuvers, and  respiratory phasicity.  The peroneal and posterior tibial veins are difficult to assess for  compressibility, but flow response to augmentation is demonstrated.  GSV DST THIGH Densely echogenic material is within  the vein that is  consistent with chronic venous thrombosis. FLOW IS SEEN  ___vein is partially compressible.  AREA OF PAIN - edema obscures the visualization of the  lat/post calf  Germain Craven  (Electronically Signed)  Final Date:      2018                    07:11    Nm-cardiac Stress Test    Addendum Date: 2018    Baseline ECG:atrial fibrillation Stress ECG: No ischemic T wave changes with peak stress Impression: Normal stress ECG, see nuclear images     Result Date: 2018   Myocardial Perfusion  Report  NUCLEAR IMAGING INTERPRETATION  No evidence of significant jeopardized viable myocardium or prior myocardial  infarction.  Normal left ventricular size, ejection fraction, and wall motion.  EMELY FLORES  MRN:    [id number]         Gender:    M  Exam Date: 2018 06:35  Exam Location:      Inpatient  Ordering Phys:     ALETHEA MASSEY  NucMed Tech:       Clarice Escoto,   Age:    52    :    1965        Ht (in):     72  Wt (lb):     298    BMI:    40.37       Radiologist  Risk Factors:             Family history of coronary disease, Diabetes,                            Hypertension  Indications:              Shortness of breath, Chest pain, unspecified  ICD Codes:                  R079  Cardiac History:          None reported  Cardiac Meds:  Meds Past 24 hrs:  Pretest Chest Pain:  STRESS TEST      Pharmacologi                   uche Agrawal   Lexiscan       Dose: 0.4 mg  ol:  Post-Injection Exercise:  Resting HR (bpm):      95  Peak HR (bpm):         109  Resting BP (mmHg):       122    /   77  Peak BP (mmHg):       131   /   92  MaxPHR:     166     Target HR (bpm):       141  % MaxPHR:     66  Double Product:       16723  BP Response:  Stress Termination:       Completed Protocol  Stress Symptoms:  Arrhythmia: None Chest Pain: None Dyspnea Flushing, Headache, Somach Cramps,  Leg Fatigue  ECG  Resting ECG:  Stress ECG:  IMAGE PROTOCOL      Rest/Stress 1                      Day          RadiopharmaceuticalDose (mCi)   Imaging  Date      Imaging  Time         Inj to Img Time (min)  Rest:   Tc-99m             7.4          2018        13:36          Tetrofosmin  Stress: Tc-99m             25.0         2018         14:27          Tetrofosmin  Rest:  Administration Site:       Left forearm  Administered by:      RT Rasheeda  Stress:  Administration Site:       Left forearm  Administered by:      Graeme Joaquin RN  % Percent HR Achieved:  SPECT RESULTS  Technical Quality:       Good  Raw Data Analysis:  Summed Stress Score:    2  Summed Rest Score:    2  Summed Difference Score:        2  PERFUSION:  Normal myocardial perfusion with no ischemia.  FUNCTIONAL RESULTS (calculated via Gated SPECT)  Stress Image LV EF:        64     %  Upper Normal Limit  Stress EDV:      92     ml   EDVI:    36      ml/m²  Stress ESV:      33     ml   ESVI:    13      ml/m²  TID:    0.96   TID - 1.19      TID (ed) - 1.23  LV Function:  Normal left ventricular wall motion.  LV ejection fraction = 64%.  Shmuel Rai  (Electronically Signed)  Final Date:      29 January 2018                   15:38    Ct-cta Chest Pulmonary Artery W/ Recons    Result Date: 2/2/2018 2/2/2018 4:04 AM HISTORY/REASON FOR EXAM:  Pain TECHNIQUE/EXAM DESCRIPTION: CT angiogram scan for pulmonary embolism with contrast, with reconstructions. 1.25 mm helical sections were obtained from the lung apices through the lung bases following the rapid bolus administration of 90 mL of Omnipaque 350 nonionic contrast. Thin-section overlapping reconstruction interval was utilized. Coronal reconstructions were generated from the axial data. MIP post processing was performed and utilized for the interpretation. Low dose optimization technique was utilized for this CT exam including automated exposure control and adjustment of the mA and/or kV according to patient size. COMPARISON: None FINDINGS: Pulmonary Embolism: No. Lungs: No suspicious nodules or air space process. Pleura: No pleural effusion. Nodes: No enlarged lymph nodes. Additional findings: There are multiple focal gas lucencies within the gallbladder, consistent with known opaque gallstones. Localized increased  opacity is seen in the retroareolar regions of both breasts, consistent with gynecomastia..     1.  No evidence of pulmonary emboli or other acute intrathoracic abnormality. 2.  Cholelithiasis. 3.  Gynecomastia.       Micro:  Results     Procedure Component Value Units Date/Time    ANAEROBIC CULTURE [[id number]] Collected:  02/19/18 1223    Order Status:  Completed Specimen:  Tissue Updated:  02/20/18 1149     Significant Indicator NEG     Source TISS     Site Right Foot     Anaerobic Culture, Culture Res Culture in progress.    FUNGAL CULTURE [[id number]] Collected:  02/19/18 1223    Order Status:  Completed Specimen:  Tissue Updated:  02/20/18 1149     Significant Indicator NEG     Source TISS     Site Right Foot     Fungal Culture Culture in progress.    CULTURE WOUND W/ GRAM STAIN [[id number]]  (Abnormal)  (Susceptibility) Collected:  02/17/18 2236    Order Status:  Completed Specimen:  Wound from Right Foot Updated:  02/20/18 0925     Gram Stain Result --     Few WBCs.  Moderate Gram positive cocci.       Significant Indicator POS (POS)     Source WND     Site RIGHT FOOT     Culture Result Wound -- (A)     Culture Result Wound -- (A)     Methicillin Resistant Staphylococcus aureus  Heavy growth  This isolate is presumed to be clindamycin resistant based on  detection of inducible resistance.  Clindamycin may still  be effective in some patients.       Culture Result Wound -- (A)     Proteus mirabilis  Light growth      Culture & Susceptibility     METHICILLIN RESISTANT STAPHYLOCOCCUS AUREUS     Antibiotic Sensitivity Microscan Unit Status    Ampicillin/sulbactam Resistant 16/8 mcg/mL Final    Clindamycin Resistant 1 mcg/mL Final    Daptomycin Sensitive 1 mcg/mL Final    Erythromycin Resistant >4 mcg/mL Final    Moxifloxacin Resistant >4 mcg/mL Final    Oxacillin Resistant >2 mcg/mL Final    Penicillin Resistant >8 mcg/mL Final    Tetracycline Sensitive <=4 mcg/mL Final    Trimeth/Sulfa Sensitive <=0.5/9.5 mcg/mL  "Final    Vancomycin Sensitive 2 mcg/mL Final              PROTEUS MIRABILIS     Antibiotic Sensitivity Microscan Unit Status    Ampicillin Sensitive <=8 mcg/mL Final    Cefepime Sensitive <=8 mcg/mL Final    Cefotaxime Sensitive <=2 mcg/mL Final    Cefotetan Sensitive <=16 mcg/mL Final    Ceftazidime Sensitive <=1 mcg/mL Final    Ceftriaxone Sensitive <=8 mcg/mL Final    Cefuroxime Sensitive <=4 mcg/mL Final    Ciprofloxacin Sensitive <=1 mcg/mL Final    Ertapenem Sensitive <=1 mcg/mL Final    Gentamicin Sensitive <=4 mcg/mL Final    Pip/Tazobactam Sensitive <=16 mcg/mL Final    Tobramycin Sensitive <=4 mcg/mL Final    Trimeth/Sulfa Sensitive <=2/38 mcg/mL Final                       GRAM STAIN [[id number]] Collected:  02/19/18 1223    Order Status:  Completed Specimen:  Tissue Updated:  02/19/18 1710     Significant Indicator .     Source TISS     Site Right Foot     Gram Stain Result Few Gram positive cocci.    CULTURE TISSUE W/ GRM STAIN [[id number]] Collected:  02/19/18 1223    Order Status:  Completed Specimen:  Other Updated:  02/19/18 1405    AFB CULTURE [[id number]] Collected:  02/19/18 1223    Order Status:  Completed Specimen:  Other Updated:  02/19/18 1405    GRAM STAIN [[id number]] Collected:  02/17/18 2236    Order Status:  Completed Specimen:  Wound Updated:  02/18/18 1805     Significant Indicator .     Source WND     Site RIGHT FOOT     Gram Stain Result --     Few WBCs.  Moderate Gram positive cocci.      BLOOD CULTURE [[id number]] Collected:  02/17/18 2245    Order Status:  Completed Specimen:  Blood from Peripheral Updated:  02/18/18 1048     Significant Indicator NEG     Source BLD     Site PERIPHERAL     Blood Culture --     No Growth    Note: Blood cultures are incubated for 5 days and  are monitored continuously.Positive blood cultures  are called to the RN and reported as soon as  they are identified.      Narrative:       Per Hospital Policy: Only change Specimen Src: to \"Line\" if  specified by " "physician order.    BLOOD CULTURE [[id number]] Collected:  02/17/18 1849    Order Status:  Completed Specimen:  Blood from Peripheral Updated:  02/18/18 1048     Significant Indicator NEG     Source BLD     Site PERIPHERAL     Blood Culture --     No Growth    Note: Blood cultures are incubated for 5 days and  are monitored continuously.Positive blood cultures  are called to the RN and reported as soon as  they are identified.      Narrative:       Per Hospital Policy: Only change Specimen Src: to \"Line\" if  specified by physician order.          Assessment:  Active Hospital Problems    Diagnosis   • *Diabetic foot infection (CMS-HCC) [E11.69, L08.9]   • Type 2 diabetes mellitus (CMS-[facility]) [E11.9]   • HLD (hyperlipidemia) [E78.5]   • HTN (hypertension) [I10]   • Atrial fibrillation (CMS-[facility]) [I48.91]       Plan:  Right foot abscess   Afebrile  Leukocytosis resolved  S/p I&D on 2/19. Multiple loculations were noted intraop  OR gram stain +GPC, cx pending  Wound cx - MRSA (vanco ISAMAR 2) and proteus  Prior cx +MRSA, efaecalis  DC aztreonam and transition to cipro  Continue linezolid due to reaction from vanco  Wound care   Anticipate 2 weeks of abx from 2/19  Stop date 03/05/18    Drug rash and tongue swelling  2/2 unasyn and also vanco  DC'd unasyn    DM2  hgA1c 6.8  Maintain BS less than 150 to control infection    Discussed with internal medicine RN.  "

## 2018-02-20 NOTE — PROGRESS NOTES
[facility] Hospitalist Progress Note    Date of Service: 2018    Chief Complaint  53 y.o. male with history of right great toe amputation in November his wound cultures positive for MRSA and clinical course, was noncompliant with oral Zyvox, admitted 2018 with worsening of diabetes for wound on the right side, right foot cellulitis    Interval Problem Update   patient is stable overnight, no significant chief complaint.  Patient otherwise denies fever, chills, nausea, vomiting, adb pain, SOB, CP, headache, constipation, diarrhea, cough, or sputum.  Patient tolerated antibiotics yesterday. Start patient on Cipro and linezolid and continue with the Bactrim.    Consultants/Specialty  Infectious disease  Orthopedics  Limb preservation  Disposition  Inpatient        Review of Systems   Constitutional: Negative.  Negative for chills, fever, malaise/fatigue and weight loss.   HENT: Negative.  Negative for ear pain and tinnitus.    Eyes: Negative.  Negative for photophobia and pain.   Respiratory: Negative.  Negative for sputum production.    Cardiovascular: Negative.  Negative for palpitations, orthopnea and claudication.   Gastrointestinal: Negative.  Negative for constipation, diarrhea, heartburn and nausea.   Genitourinary: Negative.  Negative for frequency and hematuria.   Musculoskeletal: Positive for joint pain.        Swelling, erythema, pain, wants the right foot   Skin: Negative.    Neurological: Negative.    Endo/Heme/Allergies: Negative.    Psychiatric/Behavioral: Negative.    All other systems reviewed and are negative.     Physical Exam  Laboratory/Imaging   Hemodynamics  Temp (24hrs), Av.9 °C (98.4 °F), Min:36.7 °C (98 °F), Max:37.3 °C (99.1 °F)   Temperature: 36.9 °C (98.4 °F)  Pulse  Av.5  Min: 50  Max: 77 Heart Rate (Monitored): 61  Blood Pressure: 110/55, NIBP: 103/52      Respiratory      Respiration: 17, Pulse Oximetry: 90 %        RUL Breath Sounds: Diminished, RML Breath Sounds:  Diminished, RLL Breath Sounds: Diminished, ELIAS Breath Sounds: Diminished, LLL Breath Sounds: Diminished    Fluids    Intake/Output Summary (Last 24 hours) at 02/20/18 0828  Last data filed at 02/20/18 0400   Gross per 24 hour   Intake             1550 ml   Output              550 ml   Net             1000 ml       Nutrition  Orders Placed This Encounter   Procedures   • DIET ORDER     Standing Status:   Standing     Number of Occurrences:   1     Order Specific Question:   Diet:     Answer:   Diabetic [3]     Physical Exam   Constitutional: He is oriented to person, place, and time. He appears well-developed and well-nourished.   HENT:   Head: Normocephalic and atraumatic.   Eyes: EOM are normal. Pupils are equal, round, and reactive to light. Left eye exhibits no discharge.   Neck: Normal range of motion. Neck supple. No JVD present. No tracheal deviation present.   Cardiovascular: Normal rate, regular rhythm and normal heart sounds.  Exam reveals no gallop.    No murmur heard.  Pulmonary/Chest: Effort normal and breath sounds normal. No respiratory distress. He exhibits no tenderness.   Abdominal: Soft. Bowel sounds are normal. He exhibits no distension. There is no tenderness. There is no rebound and no guarding.   Musculoskeletal:   Dorsum of the right foot is tender, erythematous, swollen, hot to touch. Approximately 2 cm skin breakdown on the dorsum of the foot with signs of recent purulent discharge.  2 Dry clean ulcers, covered with eschar on the lateral side of the foot 1 cm.    Neurological: He is alert and oriented to person, place, and time.   Skin: Skin is warm and dry. He is not diaphoretic.   Psychiatric: He has a normal mood and affect. His behavior is normal.   Nursing note and vitals reviewed.      Recent Labs      02/17/18   2245  02/19/18   0047   WBC  11.4*  9.9   RBC  4.69*  4.97   HEMOGLOBIN  13.3*  14.0   HEMATOCRIT  40.1*  42.5   MCV  85.5  85.5   MCH  28.4  28.2   MCHC  33.2*  32.9*   RDW   51.1*  49.8   PLATELETCT  297  267   MPV  10.1  10.4     Recent Labs      02/17/18   2245  02/19/18   0047   SODIUM  137  136   POTASSIUM  3.8  3.8   CHLORIDE  101  104   CO2  26  27   GLUCOSE  160*  126*   BUN  18  11   CREATININE  0.65  0.59   CALCIUM  9.4  8.6     Recent Labs      02/18/18   1639  02/19/18   0047  02/19/18   0733   APTT  70.0*  42.8*  90.4*                  Assessment/Plan     * Diabetic foot infection (CMS-HCC)- (present on admission)   Assessment & Plan    Osteomyelitis and multiple right foot abscess , status post irrigation and debridement on February 19  -Staph aureus growing. +GPC  - Infectious disease consulted  -cont with cipro, linezolid,  Bactrim  - Tolerated antibiotics          Type 2 diabetes mellitus (CMS-HCC)- (present on admission)   Assessment & Plan    Holding metformin  insulin sliding scale. Hypoglycemia protocol        HTN (hypertension)- (present on admission)   Assessment & Plan    Continue Norvasc and Prinivil   Monitor Blood pressure        Atrial fibrillation (CMS-HCC)- (present on admission)   Assessment & Plan    Continue digoxin and metoprolol. Heart rate is controlled  On admission xarelto was felt and started IV heparin for surgery  After surgery, started on subcu Lovenox  Follow up with surgery to see if they  plan any more procedure, and if not, restart Xarelto        HLD (hyperlipidemia)- (present on admission)   Assessment & Plan     continue home Pravachol .          Quality-Core Measures   Reviewed items::  Labs reviewed, Radiology images reviewed and Medications reviewed  Zabala catheter::  No Zabala  DVT prophylaxis pharmacological::  Enoxaparin (Lovenox)  DVT prophylaxis - mechanical:  SCDs  Ulcer Prophylaxis::  Not indicated  Antibiotics:  Treating active infection/contamination beyond 24 hours perioperative coverage   For complexity-based billing, please refer to the history, exam, and decison making above. In addition, I spent >35 minutes caring for the  patient today. More than 50% of the time was spent counseling and coordinating care.    I have discussed with RN and CM and SW and other consultants about patient's plan.

## 2018-02-20 NOTE — CARE PLAN
Problem: Pain Management  Goal: Pain level will decrease to patient's comfort goal  Outcome: PROGRESSING AS EXPECTED  Patient c/o pain to R foot and Oxycodone 5mg PRN given and pain moderately relieved.

## 2018-02-20 NOTE — PROGRESS NOTES
[facility] Hospitalist Progress Note    Date of Service: 2018    Chief Complaint  53 y.o. male with history of right great toe amputation in November his wound cultures positive for MRSA and clinical course, was noncompliant with oral Zyvox, admitted 2018 with worsening of diabetes for wound on the right side, right foot cellulitis    Interval Problem Update  Patient had hives and tongue swelling yesterday, presumably reaction to Unasyn, so it was discontinued, patient was started on vancomycin. Then, today he had reaction to vancomycin: Redness, itching, eye swelling. Now he is on aztreonam, linezolid and Bactrim. Status post surgery. Multiple right foot abscess is drained. Staph aureus growing, sensitivity pending    Consultants/Specialty  Infectious disease  Orthopedics  Limb preservation  Disposition  Inpatient        Review of Systems   Constitutional: Negative.    HENT: Negative.    Eyes: Negative.    Respiratory: Negative.    Cardiovascular: Negative.    Gastrointestinal: Negative.    Genitourinary: Negative.    Musculoskeletal: Positive for joint pain.        Swelling, erythema, pain, wants the right foot   Skin: Negative.    Neurological: Negative.    Endo/Heme/Allergies: Negative.    Psychiatric/Behavioral: Negative.    All other systems reviewed and are negative.     Physical Exam  Laboratory/Imaging   Hemodynamics  Temp (24hrs), Av.8 °C (98.3 °F), Min:36.4 °C (97.6 °F), Max:37.3 °C (99.1 °F)   Temperature: 36.9 °C (98.5 °F)  Pulse  Av.5  Min: 50  Max: 77 Heart Rate (Monitored): 61  Blood Pressure: 112/68, NIBP: 103/52      Respiratory      Respiration: 16, Pulse Oximetry: 97 %        RUL Breath Sounds: Diminished, RML Breath Sounds: Diminished, RLL Breath Sounds: Diminished, ELIAS Breath Sounds: Diminished, LLL Breath Sounds: Diminished    Fluids    Intake/Output Summary (Last 24 hours) at 18 192  Last data filed at 18 1400   Gross per 24 hour   Intake             4195 ml    Output              775 ml   Net             3420 ml       Nutrition  Orders Placed This Encounter   Procedures   • DIET ORDER     Standing Status:   Standing     Number of Occurrences:   1     Order Specific Question:   Diet:     Answer:   Diabetic [3]     Physical Exam   Constitutional: He is oriented to person, place, and time. He appears well-developed and well-nourished.   HENT:   Head: Normocephalic and atraumatic.   Eyes: EOM are normal. Pupils are equal, round, and reactive to light.   Neck: Normal range of motion. Neck supple.   Cardiovascular: Normal rate and regular rhythm.    Pulmonary/Chest: Effort normal and breath sounds normal. No respiratory distress.   Abdominal: Soft. Bowel sounds are normal.   Musculoskeletal:   Dorsum of the right foot is tender, erythematous, swollen, hot to touch. Approximately 2 cm skin breakdown on the dorsum of the foot with signs of recent purulent discharge.  2 Dry clean ulcers, covered with eschar on the lateral side of the foot 1 cm.    Neurological: He is alert and oriented to person, place, and time.   Skin: Skin is warm and dry.   Psychiatric: He has a normal mood and affect. His behavior is normal.   Nursing note and vitals reviewed.      Recent Labs      02/17/18 2245 02/19/18   0047   WBC  11.4*  9.9   RBC  4.69*  4.97   HEMOGLOBIN  13.3*  14.0   HEMATOCRIT  40.1*  42.5   MCV  85.5  85.5   MCH  28.4  28.2   MCHC  33.2*  32.9*   RDW  51.1*  49.8   PLATELETCT  297  267   MPV  10.1  10.4     Recent Labs      02/17/18   2245  02/19/18   0047   SODIUM  137  136   POTASSIUM  3.8  3.8   CHLORIDE  101  104   CO2  26  27   GLUCOSE  160*  126*   BUN  18  11   CREATININE  0.65  0.59   CALCIUM  9.4  8.6     Recent Labs      02/18/18   1639  02/19/18   0047  02/19/18   0733   APTT  70.0*  42.8*  90.4*                  Assessment/Plan     * Diabetic foot infection (CMS-HCC)- (present on admission)   Assessment & Plan    Osteomyelitis and multiple right foot abscess ,  status post irrigation and debridement on February 19  -Staph aureus growing.  - Infectious disease consulted  -Had a reaction to Unasyn and vancomycin, currently on linezolid, aztreonem, Bactrim          Type 2 diabetes mellitus (CMS-HCC)- (present on admission)   Assessment & Plan    Holding metformin  insulin sliding scale. Hypoglycemia protocol        HTN (hypertension)- (present on admission)   Assessment & Plan    Continue Norvasc and Prinivil   Monitor Blood pressure        Atrial fibrillation (CMS-HCC)- (present on admission)   Assessment & Plan    Continue digoxin and metoprolol. Heart rate is controlled  On admission xarelto was felt and started IV heparin for surgery  After surgery, started on subcu Lovenox  Follow up with surgery to see if they  plan any more procedure, and if not, restart Xarelto        HLD (hyperlipidemia)- (present on admission)   Assessment & Plan     continue home Pravachol .          Quality-Core Measures

## 2018-02-20 NOTE — CARE PLAN
Problem: Safety  Goal: Will remain free from injury  Outcome: PROGRESSING AS EXPECTED  Fall precautions maintained. Patient calls for assistance when OOB. Bedside table, call light, and urinal within reach.

## 2018-02-20 NOTE — PROGRESS NOTES
Bedside report received from Julia Sumner.  Patient resting quietly with no s/s of discomfort or distress noted.  Oxygen by NC as needed, IV s/l, dressing to RLE intact and foot elevated, fall precautions in place, pt calls for assist.

## 2018-02-21 LAB
ANION GAP SERPL CALC-SCNC: 6 MMOL/L (ref 0–11.9)
BACTERIA TISS AEROBE CULT: ABNORMAL
BACTERIA TISS AEROBE CULT: ABNORMAL
BASOPHILS # BLD AUTO: 0.3 % (ref 0–1.8)
BASOPHILS # BLD: 0.03 K/UL (ref 0–0.12)
BUN SERPL-MCNC: 15 MG/DL (ref 8–22)
CALCIUM SERPL-MCNC: 8.2 MG/DL (ref 8.5–10.5)
CHLORIDE SERPL-SCNC: 106 MMOL/L (ref 96–112)
CO2 SERPL-SCNC: 25 MMOL/L (ref 20–33)
CREAT SERPL-MCNC: 1.45 MG/DL (ref 0.5–1.4)
EOSINOPHIL # BLD AUTO: 1.05 K/UL (ref 0–0.51)
EOSINOPHIL NFR BLD: 11.9 % (ref 0–6.9)
ERYTHROCYTE [DISTWIDTH] IN BLOOD BY AUTOMATED COUNT: 52.1 FL (ref 35.9–50)
GLUCOSE BLD-MCNC: 105 MG/DL (ref 65–99)
GLUCOSE BLD-MCNC: 123 MG/DL (ref 65–99)
GLUCOSE BLD-MCNC: 148 MG/DL (ref 65–99)
GLUCOSE BLD-MCNC: 94 MG/DL (ref 65–99)
GLUCOSE SERPL-MCNC: 111 MG/DL (ref 65–99)
GRAM STN SPEC: ABNORMAL
HCT VFR BLD AUTO: 36.4 % (ref 42–52)
HGB BLD-MCNC: 11.9 G/DL (ref 14–18)
IMM GRANULOCYTES # BLD AUTO: 0.03 K/UL (ref 0–0.11)
IMM GRANULOCYTES NFR BLD AUTO: 0.3 % (ref 0–0.9)
LYMPHOCYTES # BLD AUTO: 2.14 K/UL (ref 1–4.8)
LYMPHOCYTES NFR BLD: 24.2 % (ref 22–41)
MCH RBC QN AUTO: 28.6 PG (ref 27–33)
MCHC RBC AUTO-ENTMCNC: 32.7 G/DL (ref 33.7–35.3)
MCV RBC AUTO: 87.5 FL (ref 81.4–97.8)
MONOCYTES # BLD AUTO: 0.68 K/UL (ref 0–0.85)
MONOCYTES NFR BLD AUTO: 7.7 % (ref 0–13.4)
NEUTROPHILS # BLD AUTO: 4.92 K/UL (ref 1.82–7.42)
NEUTROPHILS NFR BLD: 55.6 % (ref 44–72)
NRBC # BLD AUTO: 0 K/UL
NRBC BLD-RTO: 0 /100 WBC
PLATELET # BLD AUTO: 206 K/UL (ref 164–446)
PMV BLD AUTO: 10.4 FL (ref 9–12.9)
POTASSIUM SERPL-SCNC: 3.8 MMOL/L (ref 3.6–5.5)
RBC # BLD AUTO: 4.16 M/UL (ref 4.7–6.1)
SIGNIFICANT IND 70042: ABNORMAL
SITE SITE: ABNORMAL
SODIUM SERPL-SCNC: 137 MMOL/L (ref 135–145)
SOURCE SOURCE: ABNORMAL
WBC # BLD AUTO: 8.9 K/UL (ref 4.8–10.8)

## 2018-02-21 PROCEDURE — G8989 SELF CARE D/C STATUS: HCPCS | Mod: CI

## 2018-02-21 PROCEDURE — 770021 HCHG ROOM/CARE - ISO PRIVATE

## 2018-02-21 PROCEDURE — G8988 SELF CARE GOAL STATUS: HCPCS | Mod: CI

## 2018-02-21 PROCEDURE — A9270 NON-COVERED ITEM OR SERVICE: HCPCS | Performed by: INTERNAL MEDICINE

## 2018-02-21 PROCEDURE — 36415 COLL VENOUS BLD VENIPUNCTURE: CPT

## 2018-02-21 PROCEDURE — 700102 HCHG RX REV CODE 250 W/ 637 OVERRIDE(OP): Performed by: INTERNAL MEDICINE

## 2018-02-21 PROCEDURE — 82962 GLUCOSE BLOOD TEST: CPT | Mod: 91

## 2018-02-21 PROCEDURE — 80048 BASIC METABOLIC PNL TOTAL CA: CPT

## 2018-02-21 PROCEDURE — 97165 OT EVAL LOW COMPLEX 30 MIN: CPT

## 2018-02-21 PROCEDURE — A9270 NON-COVERED ITEM OR SERVICE: HCPCS | Performed by: HOSPITALIST

## 2018-02-21 PROCEDURE — 700111 HCHG RX REV CODE 636 W/ 250 OVERRIDE (IP): Performed by: INTERNAL MEDICINE

## 2018-02-21 PROCEDURE — 700111 HCHG RX REV CODE 636 W/ 250 OVERRIDE (IP): Performed by: PHYSICIAN ASSISTANT

## 2018-02-21 PROCEDURE — 700102 HCHG RX REV CODE 250 W/ 637 OVERRIDE(OP): Performed by: HOSPITALIST

## 2018-02-21 PROCEDURE — 99232 SBSQ HOSP IP/OBS MODERATE 35: CPT | Performed by: INTERNAL MEDICINE

## 2018-02-21 PROCEDURE — G8978 MOBILITY CURRENT STATUS: HCPCS | Mod: CI

## 2018-02-21 PROCEDURE — G8979 MOBILITY GOAL STATUS: HCPCS | Mod: CI

## 2018-02-21 PROCEDURE — 97161 PT EVAL LOW COMPLEX 20 MIN: CPT

## 2018-02-21 PROCEDURE — G8987 SELF CARE CURRENT STATUS: HCPCS | Mod: CI

## 2018-02-21 PROCEDURE — G8980 MOBILITY D/C STATUS: HCPCS | Mod: CI

## 2018-02-21 PROCEDURE — 85025 COMPLETE CBC W/AUTO DIFF WBC: CPT

## 2018-02-21 RX ADMIN — OXYCODONE HYDROCHLORIDE 5 MG: 5 TABLET ORAL at 16:21

## 2018-02-21 RX ADMIN — ENOXAPARIN SODIUM 40 MG: 100 INJECTION SUBCUTANEOUS at 09:01

## 2018-02-21 RX ADMIN — CIPROFLOXACIN HYDROCHLORIDE 500 MG: 500 TABLET, FILM COATED ORAL at 08:53

## 2018-02-21 RX ADMIN — ONDANSETRON HYDROCHLORIDE 4 MG: 2 INJECTION, SOLUTION INTRAMUSCULAR; INTRAVENOUS at 05:36

## 2018-02-21 RX ADMIN — ASPIRIN 81 MG: 81 TABLET, COATED ORAL at 08:52

## 2018-02-21 RX ADMIN — PRAVASTATIN SODIUM 20 MG: 10 TABLET ORAL at 20:32

## 2018-02-21 RX ADMIN — OXYCODONE HYDROCHLORIDE 5 MG: 5 TABLET ORAL at 20:33

## 2018-02-21 RX ADMIN — LINEZOLID 600 MG: 600 TABLET, FILM COATED ORAL at 20:32

## 2018-02-21 RX ADMIN — ENOXAPARIN SODIUM 40 MG: 100 INJECTION SUBCUTANEOUS at 20:34

## 2018-02-21 RX ADMIN — CIPROFLOXACIN HYDROCHLORIDE 500 MG: 500 TABLET, FILM COATED ORAL at 16:21

## 2018-02-21 RX ADMIN — OXYCODONE HYDROCHLORIDE 5 MG: 5 TABLET ORAL at 13:09

## 2018-02-21 RX ADMIN — STANDARDIZED SENNA CONCENTRATE AND DOCUSATE SODIUM 2 TABLET: 8.6; 5 TABLET, FILM COATED ORAL at 08:52

## 2018-02-21 RX ADMIN — DIGOXIN 250 MCG: 250 TABLET ORAL at 08:52

## 2018-02-21 RX ADMIN — FAMOTIDINE 20 MG: 20 TABLET, FILM COATED ORAL at 08:52

## 2018-02-21 RX ADMIN — AMLODIPINE BESYLATE 5 MG: 5 TABLET ORAL at 08:52

## 2018-02-21 RX ADMIN — LISINOPRIL 40 MG: 20 TABLET ORAL at 08:52

## 2018-02-21 RX ADMIN — METOPROLOL TARTRATE 75 MG: 25 TABLET, FILM COATED ORAL at 20:33

## 2018-02-21 RX ADMIN — LINEZOLID 600 MG: 600 TABLET, FILM COATED ORAL at 08:52

## 2018-02-21 RX ADMIN — STANDARDIZED SENNA CONCENTRATE AND DOCUSATE SODIUM 2 TABLET: 8.6; 5 TABLET, FILM COATED ORAL at 20:33

## 2018-02-21 RX ADMIN — FAMOTIDINE 20 MG: 20 TABLET, FILM COATED ORAL at 20:32

## 2018-02-21 RX ADMIN — METOPROLOL TARTRATE 75 MG: 25 TABLET, FILM COATED ORAL at 08:52

## 2018-02-21 RX ADMIN — OXYCODONE HYDROCHLORIDE 5 MG: 5 TABLET ORAL at 08:53

## 2018-02-21 ASSESSMENT — ACTIVITIES OF DAILY LIVING (ADL): TOILETING: INDEPENDENT

## 2018-02-21 ASSESSMENT — PAIN SCALES - GENERAL
PAINLEVEL_OUTOF10: 7
PAINLEVEL_OUTOF10: 5
PAINLEVEL_OUTOF10: 7
PAINLEVEL_OUTOF10: 5
PAINLEVEL_OUTOF10: 6
PAINLEVEL_OUTOF10: 7
PAINLEVEL_OUTOF10: 6
PAINLEVEL_OUTOF10: 5
PAINLEVEL_OUTOF10: 7
PAINLEVEL_OUTOF10: 7
PAINLEVEL_OUTOF10: 4
PAINLEVEL_OUTOF10: 5
PAINLEVEL_OUTOF10: 5
PAINLEVEL_OUTOF10: 3

## 2018-02-21 ASSESSMENT — COGNITIVE AND FUNCTIONAL STATUS - GENERAL
MOBILITY SCORE: 24
SUGGESTED CMS G CODE MODIFIER DAILY ACTIVITY: CH
DAILY ACTIVITIY SCORE: 24
SUGGESTED CMS G CODE MODIFIER MOBILITY: CH

## 2018-02-21 ASSESSMENT — ENCOUNTER SYMPTOMS
VOMITING: 0
CARDIOVASCULAR NEGATIVE: 1
SHORTNESS OF BREATH: 0
FEVER: 0
DIARRHEA: 0
CONSTIPATION: 0
PSYCHIATRIC NEGATIVE: 1
NEUROLOGICAL NEGATIVE: 1
GASTROINTESTINAL NEGATIVE: 1
RESPIRATORY NEGATIVE: 1
PALPITATIONS: 0
EYE PAIN: 0
PHOTOPHOBIA: 0
CONSTITUTIONAL NEGATIVE: 1
ABDOMINAL PAIN: 0
MYALGIAS: 1
WEIGHT LOSS: 0
ORTHOPNEA: 0
CHILLS: 0
NAUSEA: 0
SPUTUM PRODUCTION: 0
EYES NEGATIVE: 1

## 2018-02-21 ASSESSMENT — GAIT ASSESSMENTS
DISTANCE (FEET): 300
GAIT LEVEL OF ASSIST: SUPERVISED
ASSISTIVE DEVICE: FRONT WHEEL WALKER

## 2018-02-21 NOTE — FACE TO FACE
Face to Face Note  -  Durable Medical Equipment    Aisha Mixon M.D. - NPI: [id number]  I certify that this patient is under my care and that they have had a durable medical equipment(DME)face to face encounter by myself that meets the physician DME face-to-face encounter requirements with this patient on:    Date of encounter:   Patient:                    MRN:                       YOB: 2020  Sebastián Castro  [id number]  1965     The encounter with the patient was in whole, or in part, for the following medical condition, which is the primary reason for durable medical equipment:  Wound Care and Other - DM ulcer    I certify that, based on my findings, the following durable medical equipment is medically necessary:  Walkers.    HOME O2 Saturation Measurements:(Values must be present for Home Oxygen orders)         ,     ,         My Clinical findings support the need for the above equipment due to:  Abnormal Gait and ulcer    Supporting Symptoms: DM ulcer     ------------------------------------------------------------------------------------------------------------------    Face to Face Supporting Documentation - Home Health    The encounter with this patient was in whole or in part the primary reason for home health admission.    Date of encounter:   Patient:                    MRN:                       YOB: 2020  Sebastián Castro  [id number]  1965     Home health to see patient for:  Skilled Nursing care for assessment, interventions & education, Wound Care and Physical Therapy evaluation and treatment    Skilled need for:  Surgical Aftercare DM ulcer    Skilled nursing interventions to include:  Wound Care    Homebound evidenced status by:  Need the aid of supportive devices such as crutches, canes, wheelchairs or walkers. Leaving home must require a considerable and taxing effort. There must exist a normal inability to leave the home.    Community Physician to  provide follow up care: Jerad Lomax M.D.     Optional Interventions    Wound information & treatment:    Home Infusion Therapy orders:    Line/Drain/Airway:    I certify the face to face encounter for this home care referral meets the CMS requirements and the encounter/clinical assessment with the patient was, in whole, or in part, for the medical condition(s) listed above, which is the primary reason for home health care. Based on my clinical findings: the service(s) are medically necessary, support the need for home health care, and the homebound criteria are met.  I certify that this patient has had a face to face encounter by myself.  Aisha Mixon M.D. - NPI: [id number]    *Debility, frailty and advanced age in the absence of an acute deterioration or exacerbation of a condition do not qualify a patient for home health.

## 2018-02-21 NOTE — THERAPY
"Physical Therapy Evaluation completed.   Bed Mobility:  Supine to Sit: Supervised  Transfers: Sit to Stand: Supervised  Gait: Level Of Assist: Supervised with Front-Wheel Walker       Plan of Care: Patient with no further skilled PT needs in the acute care setting at this time  Discharge Recommendations: Equipment: Front-Wheel Walker. Post-acute therapy Discharge to home with outpatient or home health for additional skilled therapy services.    See \"Rehab Therapy-Acute\" Patient Summary Report for complete documentation.       Pt. presents with neuropathy in bilateral feet and requires bilateral custom boots to ambulate.  Pt. demonstrates steady gait using FWW for stability and for remediation of normal gait patterns.  Pt. is able to complete bed mobility, transfers, ambulation, and stairs without physical assistance.  Pt. reports he is at his baseline functional level and feels safe to return home.  Pt. with no further skilled acute PT services at this time.  Recommend home health physical therapy to further address deficits and prgress normal gait patterns and AD.  Pt. requires home health vs outpatient due to inability to drive with bilateral custom orthotic boots due to neuropathy.  "

## 2018-02-21 NOTE — CARE PLAN
Problem: Safety  Goal: Will remain free from injury  Outcome: PROGRESSING AS EXPECTED  Pt remains free from injury, Floor clear from clutter and cords.  Pt A+OX4, Non-Skid socks, Bed/chair alarm currently off, pt steady on feet. Proper signs outside pt door in place. Door remains open.  Pt uses call light appropriately. Call light with in reach of pt.  Hourly rounding in place.    Problem: Venous Thromboembolism (VTW)/Deep Vein Thrombosis (DVT) Prevention:  Goal: Patient will participate in Venous Thrombosis (VTE)/Deep Vein Thrombosis (DVT)Prevention Measures  Outcome: PROGRESSING AS EXPECTED   02/21/18 0752   OTHER   Risk Assessment Score 3   VTE RISK High   Pharmacologic Prophylaxis Used LMWH: Enoxaparin(Lovenox)   Mechanical/VTE Prophylaxis   Mechanical Prophylaxis  SCDs, Sequential Compression Device   SCDs, Sequential Compression Device Refused   Education provided about importance of SCD's, pt declined SCD's after teaching.     Problem: Pain Management  Goal: Pain level will decrease to patient's comfort goal  PRN pain medication given for pain to RLE, Elevation and rest encouraged. Dressing CDI. Pt compliant with teaching and care.

## 2018-02-21 NOTE — CARE PLAN
Problem: Safety  Goal: Will remain free from falls  Outcome: PROGRESSING AS EXPECTED  Pt educated on need to call before getting out of bed. Pt verbalizes understanding. Treaded socks on pt. Bed locked. DME in bathroom. Room safety check. Bed in lowest position. Pt calls for help appropriately on call light. Call light with in reach. Hourly rounding in place.      Problem: Bowel/Gastric:  Goal: Normal bowel function is maintained or improved  BM reported 2/20. Bowel sounds normoactive in all quadrants.

## 2018-02-21 NOTE — FACE TO FACE
Face to Face Supporting Documentation - Home Health    The encounter with this patient was in whole or in part the primary reason for home health admission.    Date of encounter:   Patient:                    MRN:                       YOB: 2020  Sebastián Castro  [id number]  1965     Home health to see patient for:  Skilled Nursing care for assessment, interventions & education and Wound Care    Skilled need for:  Exacerbation of Chronic Disease State infection    Skilled nursing interventions to include:  Comment: RN and meds and wound check    Homebound status evidenced by:  Need the aid of supportive devices such as crutches, canes, wheelchairs or walkers. Leaving home requires a considerable and taxing effort. There is a normal inability to leave the home.    Community Physician to provide follow up care: Jerad Lomax M.D.     Optional Interventions? No      I certify the face to face encounter for this home health care referral meets the CMS requirements and the encounter/clinical assessment with the patient was, in whole, or in part, for the medical condition(s) listed above, which is the primary reason for home health care. Based on my clinical findings: the service(s) are medically necessary, support the need for home health care, and the homebound criteria are met.  I certify that this patient has had a face to face encounter by myself.  Aisha Mixon M.D. - NPI: [id number]

## 2018-02-21 NOTE — PROGRESS NOTES
Assumed care of pt 1845. Pt resting in bed.  Reviewed POC including safety, mobility, pain management, medication administration, DVT prophylaxis, and discharge. Pt did not eat dinner. Pt continues to complain of nausea. MD contacted and order for compazine received. Pt requesting to sleep. Pt refused Ciprofloxacin as pt reported that was what made him nauseas.  Call light within reach. Pt calls appropriately. Hourly rounding in place. Pt has no needs or concerns at this time.

## 2018-02-21 NOTE — PROGRESS NOTES
Patient seen and examined  No complaints at this time    Blood pressure 128/80, pulse 70, temperature 36.3 °C (97.3 °F), resp. rate 16, height 1.829 m (6'), weight (!) 133.3 kg (293 lb 14 oz), SpO2 96 %.    Recent Labs      02/19/18   0047  02/21/18   0523   WBC  9.9  8.9   RBC  4.97  4.16*   HEMOGLOBIN  14.0  11.9*   HEMATOCRIT  42.5  36.4*   MCV  85.5  87.5   MCH  28.2  28.6   MCHC  32.9*  32.7*   RDW  49.8  52.1*   PLATELETCT  267  206   MPV  10.4  10.4       No acute distress  Dressing clean dry and intact  Incision well approximated, scant sanguinous output  Small eschar, ecchymosis as expected, interval improved cellulitis, swelling improved  Baseline peripheral neuropathy      POD#2 ray left 5th toe    Plan:  DVT Prophylaxis- TEDS/SCDs,   Weight Bearing Status- wbat thru heel  PT/OT  Antibiotics: per ID  Chem ppx per primary  Case Coordination

## 2018-02-21 NOTE — PROGRESS NOTES
[facility] Hospitalist Progress Note    Date of Service: 2018    Chief Complaint  53 y.o. male with history of right great toe amputation in November his wound cultures positive for MRSA and clinical course, was noncompliant with oral Zyvox, admitted 2018 with worsening of diabetes for wound on the right side, right foot cellulitis    Interval Problem Update   patient is stable overnight, no significant chief complaint.  Patient otherwise denies fever, chills, nausea, vomiting, adb pain, SOB, CP, headache, constipation, diarrhea, cough, or sputum.  Patient tolerated antibiotics yesterday. Start patient on Cipro and linezolid and continue with the Bactrim.     patient is stable and no significant chief complaint overnight. Denies fever, chills, nausea vomiting diarrhea. Patient currently pending PT OT. Still on antibiotics for MRSA.    Consultants/Specialty  Infectious disease  Orthopedics  Limb preservation  Disposition  Inpatient        Review of Systems   Constitutional: Negative.  Negative for chills, malaise/fatigue and weight loss.   HENT: Negative.  Negative for ear pain and tinnitus.    Eyes: Negative.  Negative for photophobia and pain.   Respiratory: Negative.  Negative for sputum production.    Cardiovascular: Negative.  Negative for palpitations and orthopnea.   Gastrointestinal: Negative.  Negative for abdominal pain, constipation and nausea.   Genitourinary: Negative.  Negative for hematuria and urgency.   Musculoskeletal: Positive for joint pain.        Swelling, erythema, pain, wants the right foot   Skin: Negative.    Neurological: Negative.    Endo/Heme/Allergies: Negative.    Psychiatric/Behavioral: Negative.    All other systems reviewed and are negative.     Physical Exam  Laboratory/Imaging   Hemodynamics  Temp (24hrs), Av.4 °C (97.6 °F), Min:36.3 °C (97.3 °F), Max:36.5 °C (97.7 °F)   Temperature: 36.3 °C (97.3 °F)  Pulse  Av.2  Min: 50  Max: 77    Blood Pressure: 128/80       Respiratory      Respiration: 16, Pulse Oximetry: 96 %        RUL Breath Sounds: Diminished, RML Breath Sounds: Diminished, RLL Breath Sounds: Diminished, ELIAS Breath Sounds: Diminished, LLL Breath Sounds: Diminished    Fluids  No intake or output data in the 24 hours ending 02/21/18 0921    Nutrition  Orders Placed This Encounter   Procedures   • DIET ORDER     Standing Status:   Standing     Number of Occurrences:   1     Order Specific Question:   Diet:     Answer:   Diabetic [3]     Physical Exam   Constitutional: He is oriented to person, place, and time. He appears well-developed. No distress.   HENT:   Head: Normocephalic and atraumatic.   Eyes: EOM are normal. Pupils are equal, round, and reactive to light. Left eye exhibits no discharge.   Neck: Normal range of motion. Neck supple. No JVD present. No tracheal deviation present.   Cardiovascular: Normal rate, regular rhythm and normal heart sounds.    No murmur heard.  Pulmonary/Chest: Effort normal and breath sounds normal. He has no wheezes. He has no rales. He exhibits no tenderness.   Abdominal: Soft. Bowel sounds are normal. He exhibits no distension and no mass. There is no tenderness. There is no rebound.   Musculoskeletal:   Dorsum of the right foot is tender, erythematous, swollen, hot to touch. Approximately 2 cm skin breakdown on the dorsum of the foot with signs of recent purulent discharge.  2 Dry clean ulcers, covered with eschar on the lateral side of the foot 1 cm.    Neurological: He is alert and oriented to person, place, and time.   Skin: Skin is warm and dry. He is not diaphoretic.   Psychiatric: He has a normal mood and affect. His behavior is normal.   Nursing note and vitals reviewed.      Recent Labs      02/19/18   0047  02/21/18   0523   WBC  9.9  8.9   RBC  4.97  4.16*   HEMOGLOBIN  14.0  11.9*   HEMATOCRIT  42.5  36.4*   MCV  85.5  87.5   MCH  28.2  28.6   MCHC  32.9*  32.7*   RDW  49.8  52.1*   PLATELETCT  267  206   MPV  10.4   10.4     Recent Labs      02/19/18   0047  02/21/18   0523   SODIUM  136  137   POTASSIUM  3.8  3.8   CHLORIDE  104  106   CO2  27  25   GLUCOSE  126*  111*   BUN  11  15   CREATININE  0.59  1.45*   CALCIUM  8.6  8.2*     Recent Labs      02/18/18   1639  02/19/18   0047  02/19/18   0733   APTT  70.0*  42.8*  90.4*                  Assessment/Plan     * Diabetic foot infection (CMS-HCC)- (present on admission)   Assessment & Plan    Osteomyelitis and multiple right foot abscess , status post irrigation and debridement on February 19  -Staph aureus growing. +GPC  - Infectious disease consulted  -cont with cipro, linezolid,  Bactrim  - Tolerated antibiotics  - need HH and PT OT at home        Type 2 diabetes mellitus (CMS-HCC)- (present on admission)   Assessment & Plan    Holding metformin  insulin sliding scale. Hypoglycemia protocol        HTN (hypertension)- (present on admission)   Assessment & Plan    Continue Norvasc and Prinivil   Monitor Blood pressure        Atrial fibrillation (CMS-HCC)- (present on admission)   Assessment & Plan    Continue digoxin and metoprolol. Heart rate is controlled  On admission xarelto was felt and started IV heparin for surgery  After surgery, started on subcu Lovenox  Follow up with surgery to see if they  plan any more procedure, and if not, restart Xarelto        HLD (hyperlipidemia)- (present on admission)   Assessment & Plan     continue home Pravachol .          Quality-Core Measures   Reviewed items::  Labs reviewed, Radiology images reviewed and Medications reviewed  Zabala catheter::  No Zabala  DVT prophylaxis pharmacological::  Enoxaparin (Lovenox)  DVT prophylaxis - mechanical:  SCDs  Ulcer Prophylaxis::  Not indicated  Antibiotics:  Treating active infection/contamination beyond 24 hours perioperative coverage   For complexity-based billing, please refer to the history, exam, and decison making above. In addition, I spent >35 minutes caring for the patient today. More  than 50% of the time was spent counseling and coordinating care.    I have discussed with RN and CM and SW and other consultants about patient's plan.

## 2018-02-21 NOTE — PROGRESS NOTES
Infectious Disease Progress Note    Author: Jane Anderson M.D. Date & Time of service: 2018  1:08 PM    Chief Complaint:  FU right foot abscess    Interval History:   AF, WBc 9.9, had reaction to unasyn earlier today with rash, itching and tongue swelling, now on vancomycin, just returned from I&D, pain controlled, never had cephalosporins per pt   AF, no CBC, had rash, itching and tongue swelling with vanco, abx changed to linezolid which he is tolerating, also tolerating aztreonam   AF, WBC 8.9, pain controlled, tolerating cipro without issues, plan for surgical dressing removal today per pt  Labs Reviewed, Medications Reviewed, Radiology Reviewed and Wound Reviewed.    Review of Systems:  Review of Systems   Constitutional: Negative for chills and fever.   Respiratory: Negative for shortness of breath.    Gastrointestinal: Negative for abdominal pain, diarrhea, nausea and vomiting.   Musculoskeletal: Positive for joint pain and myalgias.   Skin: Positive for itching and rash.       Hemodynamics:  Temp (24hrs), Av.4 °C (97.5 °F), Min:36.3 °C (97.3 °F), Max:36.5 °C (97.7 °F)  Temperature: 36.3 °C (97.3 °F)  Pulse  Av.2  Min: 50  Max: 77   Blood Pressure: 128/80       Physical Exam:  Physical Exam   Constitutional: He is oriented to person, place, and time. He appears well-developed.   HENT:   Head: Normocephalic and atraumatic.   Eyes: EOM are normal. Pupils are equal, round, and reactive to light.   Neck: Neck supple.   Cardiovascular: Normal rate.    IRR   Pulmonary/Chest: Effort normal and breath sounds normal.   Abdominal: Soft. There is no tenderness.   Musculoskeletal:   R foot in surgical dressing   Neurological: He is alert and oriented to person, place, and time.       Meds:    Current Facility-Administered Medications:   •  insulin regular **AND** [CANCELED] Accu-Chek Q6 if NPO **AND** NOTIFY MD and PharmD **AND** glucose 4 g **AND** dextrose 50%  •  ciprofloxacin  •   ondansetron  •  ondansetron  •  prochlorperazine  •  prochlorperazine  •  enoxaparin (LOVENOX) injection  •  linezolid  •  pravastatin  •  metoprolol  •  lisinopril  •  digoxin  •  aspirin  •  amLODIPine  •  senna-docusate **AND** polyethylene glycol/lytes **AND** magnesium hydroxide **AND** bisacodyl  •  NS  •  acetaminophen  •  Notify provider if pain remains uncontrolled **AND** Use the numeric rating scale (NRS-11) on regular floors and Critical-Care Pain Observation Tool (CPOT) on ICUs/Trauma to assess pain **AND** Pulse Ox (Oximetry) **AND** [DISCONTINUED] Pharmacy Consult Request **AND** If patient difficult to arouse and/or has respiratory depression, stop any opiates that are currently infusing and call a Rapid Response. **AND** oxyCODONE immediate-release **AND** oxyCODONE immediate-release **AND** morphine injection  •  famotidine  •  diphenhydrAMINE    Labs:  Recent Labs      02/19/18 0047 02/21/18   0523   WBC  9.9  8.9   RBC  4.97  4.16*   HEMOGLOBIN  14.0  11.9*   HEMATOCRIT  42.5  36.4*   MCV  85.5  87.5   MCH  28.2  28.6   RDW  49.8  52.1*   PLATELETCT  267  206   MPV  10.4  10.4   NEUTSPOLYS   --   55.60   LYMPHOCYTES   --   24.20   MONOCYTES   --   7.70   EOSINOPHILS   --   11.90*   BASOPHILS   --   0.30     Recent Labs      02/19/18 0047 02/21/18   0523   SODIUM  136  137   POTASSIUM  3.8  3.8   CHLORIDE  104  106   CO2  27  25   GLUCOSE  126*  111*   BUN  11  15     Recent Labs      02/19/18 0047  02/21/18   0523   CREATININE  0.59  1.45*       Imaging:  Dx-chest-for Picc Line Perform Procedure In: Patient's Room    Result Date: 1/20/2018 1/20/2018 11:02 PM HISTORY/REASON FOR EXAM:  PICC line placement. TECHNIQUE/EXAM DESCRIPTION AND NUMBER OF VIEWS: Single view of the chest. COMPARISON: 11/20/2017 FINDINGS: Cardiac mediastinal contour is unchanged. Lungs show hypoinflation. Pulmonary vessels are again mildly prominent. No pleural fluid collection or pneumothorax. LEFT arm PICC line in  place with tip at the mid SVC. No major bony abnormality is seen.     1.  Supportive tubing as described above. 2.  No pneumonia or pneumothorax. 3.  Pulmonary vasculature is again mildly prominent.    Dx-chest-limited (1 View)    Result Date: 2/1/2018 2/1/2018 1:00 PM HISTORY/REASON FOR EXAM:  Chest Pain Right chest pain TECHNIQUE/EXAM DESCRIPTION AND NUMBER OF VIEWS: Single portable view of the chest. COMPARISON: 1/26/2018 FINDINGS: No pulmonary infiltrates or consolidations are noted. No pleural effusion. No pneumothorax. Stable enlarged cardiopericardial silhouette.     Stable cardiomegaly No acute cardiopulmonary abnormalities are identified.    Dx-chest-limited (1 View)    Addendum Date: 1/26/2018    Addendum: Left PICC line is seen terminating overlying the expected location of the proximal superior vena cava    Result Date: 1/26/2018 1/26/2018 3:16 AM HISTORY/REASON FOR EXAM:  PICC line placement TECHNIQUE/EXAM DESCRIPTION:  Single AP view of the chest. COMPARISON: January 20, 2018 FINDINGS: Overlying cardiac leads are present. The heart is in the upper limits of normal for size. The mediastinal contour appears within normal limits.  The central pulmonary vasculature appears normal. The lungs appear well expanded bilaterally.  Bilateral lungs are clear. No significant pleural effusions are identified. The bony structures appear age-appropriate.     1.  No acute cardiopulmonary disease.    Dx-chest-portable (1 View)    Result Date: 2/8/2018 2/8/2018 2:57 PM HISTORY/REASON FOR EXAM:  Chest Pain Mid sternal chest pain. TECHNIQUE/EXAM DESCRIPTION AND NUMBER OF VIEWS: Single portable view of the chest. COMPARISON: 2/1/18 FINDINGS: No pulmonary infiltrates or consolidations are noted. No pleural effusion. No pneumothorax. Stable enlarged cardiopericardial silhouette.     1. Stable cardiomegaly No pulmonary infiltrates or consolidations are noted.    Dx-foot-complete 3+ Right    Result Date:  2/17/2018 2/17/2018 11:04 PM HISTORY/REASON FOR EXAM:  Right foot infection, open wound TECHNIQUE/EXAM DESCRIPTION AND NUMBER OF VIEWS: 3 views of the RIGHT foot. COMPARISON:  Right foot x-ray 2/2/2018 FINDINGS: There has been first transmetatarsal amputation. The stump appears within normal limits. There is adjacent soft tissue swelling. 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 There are subluxations at multiple metatarsophalangeal joint. There is spurring at the insertion of achilles' tendon and origin of plantar fascia.     1.  Prior first transmetatarsal amputation 2.  Midfoot osteoarthritis 3.  calcaneal spurring    Dx-foot-complete 3+ Right    Result Date: 2/2/2018 2/2/2018 4:40 AM HISTORY/REASON FOR EXAM:  Right foot pain.. TECHNIQUE/EXAM DESCRIPTION AND NUMBER OF VIEWS:  3 views of the RIGHT foot. COMPARISON: 1/15/2018 FINDINGS: There is evidence of amputation through the proximal shaft of the first metatarsal, which has occurred since the prior images. There is generalized soft tissue swelling about the distal foot. No soft tissue gas however. No acute bony abnormality.     No acute bony abnormality.    Mr-foot-with & W/o Right    Result Date: 2/19/2018 2/18/2018 4:39 PM HISTORY/REASON FOR EXAM:  Open wound. Rt Foot pain, Open Wound Mid Foot TECHNIQUE/EXAM DESCRIPTION: MRI of the RIGHT foot with and without contrast. The study was performed on a Nighat 3.0 Taya MRI scanner. T1 axial, T1 sagittal, T1 coronal, STIR sagittal, T2 fast spin-echo fat-suppressed coronal, sagittal inversion recovery, and T1 postcontrast coronal images were obtained. 20 mL Omniscan contrast was administered intravenously. COMPARISON: 1/16/2018. FINDINGS: Interval further amputation of the first ray with small residual first metatarsal stump. There is a deep soft tissue ulcer at the amputation bed with extensive phlegmonous change. There are susceptibility artifact foci within the surgical bed which could relate to gas. There is  tenosynovitis of the distal extensor hallucis longus underneath the ulcer. There is bone marrow replacement in the first metatarsal stump, consistent with osteomyelitis.     MR evidence of osteomyelitis within the first metatarsal stump. Deep soft tissue ulcer at the amputation bed with extensive phlegmonous change and possible gas. No discrete fluid collection seen. There is tenosynovitis of the distal extensor hallucis longus underneath the ulcer.     Echocardiogram-comp W/ Cont    Result Date: 2018  Transthoracic Echo Report Echocardiography Laboratory CONCLUSIONS Technically difficult but adequate study for interpretation. Contrast was used to enhance visualization of the endocardial border. Normal left ventricular chamber size. Normal left ventricular systolic function. Left ventricular ejection fraction is visually estimated to be 60%. Mild concentric left ventricular hypertrophy. Trace mitral regurgitation. Normal left atrial size. Structurally normal aortic valve without significant stenosis or regurgitation. Mild tricuspid regurgitation. Estimated right ventricular systolic pressure  is 35 mmHg. Normal inferior vena cava size and inspiratory collapse. Normal right ventricular size and systolic function. EMELY FLORES Exam Date:         2018                    08:43 Exam Location:     Inpatient Priority:          Routine Ordering Physician:        FABIAN RAMIRES Referring Physician: Sonographer:               Sherry Funes RDCS Age:    52     Gender:    M MRN:    [id number] :    1965 BSA:    2.51   Ht (in):    72     Wt (lb):    295 Exam Type:     Complete, Contrast Indications:     Paroxysmal atrial fibrillation ICD Codes:       I480 CPT Codes:       41619,  BP:   145    /   101    HR:   110 Technical Quality:       Technically difficult study -                          adequate information is obtained MEASUREMENTS  (Male / Female) Normal Values 2D ECHO LV Diastolic Diameter PLAX         4.4 cm                4.2 - 5.9 / 3.9 - 5.3 cm LV Systolic Diameter PLAX         2.5 cm                2.1 - 4.0 cm IVS Diastolic Thickness           1.5 cm                LVPW Diastolic Thickness          1.1 cm                RV Diameter 4C                    3.2 cm                2.5 - 2.9 cm LVOT Diameter                     2.1 cm                RA Diameter                       3.6 cm                Estimated LV Ejection Fraction    60 %                  LV Ejection Fraction MOD BP       54.7 %                >= 55  % LV Ejection Fraction MOD 4C       64.7 %                LV Ejection Fraction MOD 2C       55.5 %                LA Volume Index                   28.8 cm³/m²           16 - 28 cm³/m² IVC Diameter                      1.5 cm                M-MODE Aortic Root Diameter MM           3.9 cm                DOPPLER AV Peak Velocity                  1.3 m/s               AV Peak Gradient                  7 mmHg                AV Mean Gradient                  3.5 mmHg              LVOT Peak Velocity                1.2 m/s               AV Area Cont Eq vti               3 cm²                 TR Peak Velocity                  239 cm/s              PV Peak Velocity                  0.91 m/s              PV Peak Gradient                  3.3 mmHg              RVOT Peak Velocity                0.69 m/s              * Indicates values subject to auto-interpretation LV EF:  60    % FINDINGS Left Ventricle 3 mL of contrast was administered. Contrast was used to enhance visualization of the endocardial border. Existing IV was used. Normal left ventricular chamber size. Mild concentric left ventricular hypertrophy. Normal left ventricular systolic function. Left ventricular ejection fraction is visually estimated to be 60%. Diastolic function is difficult to assess with atrial fibrillation. Right Ventricle Normal right ventricular size and systolic function. Right Atrium Normal right atrial size. Normal  inferior vena cava size and inspiratory collapse. Left Atrium Normal left atrial size. Mitral Valve Thickened mitral valve leaflets. Trace mitral regurgitation. Aortic Valve Structurally normal aortic valve without significant stenosis or regurgitation. Tricuspid Valve Mild tricuspid regurgitation. Estimated right ventricular systolic pressure  is 35 mmHg. Pulmonic Valve Structurally normal pulmonic valve without significant stenosis or regurgitation. Pericardium Normal pericardium without effusion. Aorta Aortic root dilatation measuring 3.9 cm. Rocio Ross M.D. (Electronically Signed) Final Date:     2018                 14:23    Le Venous Duplex (specify In Comments Left, Right Or Bilateral)    Result Date: 2018   Vascular Laboratory  CONCLUSIONS  Normal bilateral superficial and deep venous examination of the right leg.  MARTHAALFONSOEMELY VENCES  Exam Date:     2018 04:36  Room #:     Inpatient  Priority:     Routine  Ht (in):             Wt (lb):  Ordering Physician:        SHIRLEY WILLIAMSON  Referring Physician:       [id number] Sultana  Sonographer:               YOKASTA troncoso rvt  Study Type:  Technical Quality:         Adequate  Age:    52    Gender:     M  MRN:    [id number]  :    1965      BSA:  Indications:     RT LEG SWELLING  CPT Codes:  ICD Codes:  History:         Swelling of the rt limb  Limitations:     body habitus in calf  PROCEDURES:  rt lower ext venous doppler  FINDINGS:  Complete color filling and compressibility with normal venous flow dynamics  including spontaneous flow, response to augmentation maneuvers, and  respiratory phasicity.  The peroneal and posterior tibial veins are difficult to assess for  compressibility, but flow response to augmentation is demonstrated.  GSV DST THIGH Densely echogenic material is within  the vein that is  consistent with chronic venous thrombosis. FLOW IS SEEN  ___vein is partially compressible.  AREA OF PAIN - edema obscures the  visualization of the  lat/post calf  Germain Craven  (Electronically Signed)  Final Date:      2018                   07:11    Nm-cardiac Stress Test    Addendum Date: 2018    Baseline ECG:atrial fibrillation Stress ECG: No ischemic T wave changes with peak stress Impression: Normal stress ECG, see nuclear images     Result Date: 2018   Myocardial Perfusion  Report  NUCLEAR IMAGING INTERPRETATION  No evidence of significant jeopardized viable myocardium or prior myocardial  infarction.  Normal left ventricular size, ejection fraction, and wall motion.  EMELY FLORES  MRN:    [id number]         Gender:    M  Exam Date: 2018 06:35  Exam Location:      Inpatient  Ordering Phys:     ALETHEA MASSEY  NucMed Tech:       Clarice Escoto, RT  Age:    52    :    1965        Ht (in):     72  Wt (lb):     298    BMI:    40.37       Radiologist  Risk Factors:             Family history of coronary disease, Diabetes,                            Hypertension  Indications:              Shortness of breath, Chest pain, unspecified  ICD Codes:                  R079  Cardiac History:          None reported  Cardiac Meds:  Meds Past 24 hrs:  Pretest Chest Pain:  STRESS TEST      Pharmacologi                   c  Protoc   Lexiscan       Dose: 0.4 mg  ol:  Post-Injection Exercise:  Resting HR (bpm):      95  Peak HR (bpm):         109  Resting BP (mmHg):       122    /   77  Peak BP (mmHg):       131   /   92  MaxPHR:     166     Target HR (bpm):       141  % MaxPHR:     66  Double Product:       59194  BP Response:  Stress Termination:       Completed Protocol  Stress Symptoms:  Arrhythmia: None Chest Pain: None Dyspnea Flushing, Headache, Somach Cramps,  Leg Fatigue  ECG  Resting ECG:  Stress ECG:  IMAGE PROTOCOL      Rest/Stress 1                      Day          RadiopharmaceuticalDose (mCi)   Imaging  Date      Imaging  Time         Inj to Img Time (min)  Rest:   Tc-99m              7.4          29-Jan-2018        13:36          Tetrofosmin  Stress: Tc-99m             25.0         29-Jan-2018        14:27          Tetrofosmin  Rest:  Administration Site:       Left forearm  Administered by:      RT Rasheeda  Stress:  Administration Site:       Left forearm  Administered by:      Graeme Joaquin RN  % Percent HR Achieved:  SPECT RESULTS  Technical Quality:       Good  Raw Data Analysis:  Summed Stress Score:    2  Summed Rest Score:    2  Summed Difference Score:        2  PERFUSION:  Normal myocardial perfusion with no ischemia.  FUNCTIONAL RESULTS (calculated via Gated SPECT)  Stress Image LV EF:        64     %  Upper Normal Limit  Stress EDV:      92     ml   EDVI:    36      ml/m²  Stress ESV:      33     ml   ESVI:    13      ml/m²  TID:    0.96   TID - 1.19      TID (ed) - 1.23  LV Function:  Normal left ventricular wall motion.  LV ejection fraction = 64%.  Shmuel Rai  (Electronically Signed)  Final Date:      29 January 2018                   15:38    Ct-cta Chest Pulmonary Artery W/ Recons    Result Date: 2/2/2018 2/2/2018 4:04 AM HISTORY/REASON FOR EXAM:  Pain TECHNIQUE/EXAM DESCRIPTION: CT angiogram scan for pulmonary embolism with contrast, with reconstructions. 1.25 mm helical sections were obtained from the lung apices through the lung bases following the rapid bolus administration of 90 mL of Omnipaque 350 nonionic contrast. Thin-section overlapping reconstruction interval was utilized. Coronal reconstructions were generated from the axial data. MIP post processing was performed and utilized for the interpretation. Low dose optimization technique was utilized for this CT exam including automated exposure control and adjustment of the mA and/or kV according to patient size. COMPARISON: None FINDINGS: Pulmonary Embolism: No. Lungs: No suspicious nodules or air space process. Pleura: No pleural effusion. Nodes: No enlarged lymph nodes. Additional  findings: There are multiple focal gas lucencies within the gallbladder, consistent with known opaque gallstones. Localized increased opacity is seen in the retroareolar regions of both breasts, consistent with gynecomastia..     1.  No evidence of pulmonary emboli or other acute intrathoracic abnormality. 2.  Cholelithiasis. 3.  Gynecomastia.       Micro:  Results     Procedure Component Value Units Date/Time    CULTURE TISSUE W/ GRM STAIN [[id number]]  (Abnormal) Collected:  02/19/18 1223    Order Status:  Completed Specimen:  Tissue Updated:  02/21/18 0827     Gram Stain Result Few Gram positive cocci. (A)     Significant Indicator POS (POS)     Source TISS     Site Right Foot     Tissue Culture -- (A)     Tissue Culture -- (A)     Methicillin Resistant Staphylococcus aureus  Moderate growth  See previous culture for sensitivity report.      ANAEROBIC CULTURE [[id number]] Collected:  02/19/18 1223    Order Status:  Completed Specimen:  Tissue Updated:  02/21/18 0827     Significant Indicator NEG     Source TISS     Site Right Foot     Anaerobic Culture, Culture Res Culture in progress.    FUNGAL CULTURE [[id number]] Collected:  02/19/18 1223    Order Status:  Completed Specimen:  Tissue Updated:  02/21/18 0827     Significant Indicator NEG     Source TISS     Site Right Foot     Fungal Culture Culture in progress.    AFB CULTURE [[id number]] Collected:  02/19/18 1223    Order Status:  Completed Specimen:  Tissue Updated:  02/21/18 0827     Significant Indicator NEG     Source TISS     Site Right Foot     AFB Culture Culture in progress.     AFB Smear Results No acid fast bacilli seen.    GRAM STAIN [[id number]] Collected:  02/19/18 1223    Order Status:  Completed Specimen:  Tissue Updated:  02/20/18 2107     Significant Indicator .     Source TISS     Site Right Foot     Gram Stain Result Few Gram positive cocci.    ACID FAST STAIN [[id number]] Collected:  02/19/18 1223    Order Status:  Completed Specimen:  Tissue  Updated:  02/20/18 2107     Significant Indicator NEG     Source TISS     Site Right Foot     AFB Smear Results No acid fast bacilli seen.    CULTURE WOUND W/ GRAM STAIN [[id number]]  (Abnormal)  (Susceptibility) Collected:  02/17/18 2236    Order Status:  Completed Specimen:  Wound from Right Foot Updated:  02/20/18 0906     Gram Stain Result --     Few WBCs.  Moderate Gram positive cocci.       Significant Indicator POS (POS)     Source WND     Site RIGHT FOOT     Culture Result Wound -- (A)     Culture Result Wound -- (A)     Methicillin Resistant Staphylococcus aureus  Heavy growth  This isolate is presumed to be clindamycin resistant based on  detection of inducible resistance.  Clindamycin may still  be effective in some patients.       Culture Result Wound -- (A)     Proteus mirabilis  Light growth      Culture & Susceptibility     METHICILLIN RESISTANT STAPHYLOCOCCUS AUREUS     Antibiotic Sensitivity Microscan Unit Status    Ampicillin/sulbactam Resistant 16/8 mcg/mL Final    Clindamycin Resistant 1 mcg/mL Final    Daptomycin Sensitive 1 mcg/mL Final    Erythromycin Resistant >4 mcg/mL Final    Moxifloxacin Resistant >4 mcg/mL Final    Oxacillin Resistant >2 mcg/mL Final    Penicillin Resistant >8 mcg/mL Final    Tetracycline Sensitive <=4 mcg/mL Final    Trimeth/Sulfa Sensitive <=0.5/9.5 mcg/mL Final    Vancomycin Sensitive 2 mcg/mL Final              PROTEUS MIRABILIS     Antibiotic Sensitivity Microscan Unit Status    Ampicillin Sensitive <=8 mcg/mL Final    Cefepime Sensitive <=8 mcg/mL Final    Cefotaxime Sensitive <=2 mcg/mL Final    Cefotetan Sensitive <=16 mcg/mL Final    Ceftazidime Sensitive <=1 mcg/mL Final    Ceftriaxone Sensitive <=8 mcg/mL Final    Cefuroxime Sensitive <=4 mcg/mL Final    Ciprofloxacin Sensitive <=1 mcg/mL Final    Ertapenem Sensitive <=1 mcg/mL Final    Gentamicin Sensitive <=4 mcg/mL Final    Pip/Tazobactam Sensitive <=16 mcg/mL Final    Tobramycin Sensitive <=4 mcg/mL Final  "   Trimeth/Sulfa Sensitive <=2/38 mcg/mL Final                       GRAM STAIN [[id number]] Collected:  02/17/18 2236    Order Status:  Completed Specimen:  Wound Updated:  02/18/18 1805     Significant Indicator .     Source WND     Site RIGHT FOOT     Gram Stain Result --     Few WBCs.  Moderate Gram positive cocci.      BLOOD CULTURE [[id number]] Collected:  02/17/18 2245    Order Status:  Completed Specimen:  Blood from Peripheral Updated:  02/18/18 1048     Significant Indicator NEG     Source BLD     Site PERIPHERAL     Blood Culture --     No Growth    Note: Blood cultures are incubated for 5 days and  are monitored continuously.Positive blood cultures  are called to the RN and reported as soon as  they are identified.      Narrative:       Per Hospital Policy: Only change Specimen Src: to \"Line\" if  specified by physician order.    BLOOD CULTURE [[id number]] Collected:  02/17/18 2316    Order Status:  Completed Specimen:  Blood from Peripheral Updated:  02/18/18 1048     Significant Indicator NEG     Source BLD     Site PERIPHERAL     Blood Culture --     No Growth    Note: Blood cultures are incubated for 5 days and  are monitored continuously.Positive blood cultures  are called to the RN and reported as soon as  they are identified.      Narrative:       Per Hospital Policy: Only change Specimen Src: to \"Line\" if  specified by physician order.          Assessment:  Active Hospital Problems    Diagnosis   • *Diabetic foot infection (CMS-HCC) [E11.69, L08.9]   • Type 2 diabetes mellitus (CMS-[facility]) [E11.9]   • HLD (hyperlipidemia) [E78.5]   • HTN (hypertension) [I10]   • Atrial fibrillation (CMS-HCC) [I48.91]       Plan:  Right foot abscess   Afebrile  Leukocytosis resolved  S/p I&D on 2/19. Multiple loculations were noted intraop  OR gram stain +GPC, cx MRSA  Wound cx - MRSA (vanco ISAMAR 2) and proteus  Prior cx +MRSA, efaecalis  Continue cipro  Continue linezolid due to reaction from vanco  Wound care "   Anticipate 2 weeks of abx from 2/19  Stop date 03/05/18    Drug rash and tongue swelling. Resolved   2/2 unasyn and also vanco  DC'sho unasyn    DM2  hgA1c 6.8  Maintain BS less than 150 to control infection    Discussed with internal medicine RN.

## 2018-02-22 ENCOUNTER — HOME HEALTH ADMISSION (OUTPATIENT)
Dept: HOME HEALTH SERVICES | Facility: HOME HEALTHCARE | Age: 53
End: 2018-02-22
Payer: MEDICAID

## 2018-02-22 LAB
BACTERIA SPEC ANAEROBE CULT: NORMAL
BACTERIA SPEC ANAEROBE CULT: NORMAL
GLUCOSE BLD-MCNC: 122 MG/DL (ref 65–99)
GLUCOSE BLD-MCNC: 126 MG/DL (ref 65–99)
GLUCOSE BLD-MCNC: 140 MG/DL (ref 65–99)
SIGNIFICANT IND 70042: NORMAL
SITE SITE: NORMAL
SOURCE SOURCE: NORMAL

## 2018-02-22 PROCEDURE — 82962 GLUCOSE BLOOD TEST: CPT

## 2018-02-22 PROCEDURE — 700102 HCHG RX REV CODE 250 W/ 637 OVERRIDE(OP): Performed by: INTERNAL MEDICINE

## 2018-02-22 PROCEDURE — A9270 NON-COVERED ITEM OR SERVICE: HCPCS | Performed by: INTERNAL MEDICINE

## 2018-02-22 PROCEDURE — 700102 HCHG RX REV CODE 250 W/ 637 OVERRIDE(OP): Performed by: HOSPITALIST

## 2018-02-22 PROCEDURE — 770021 HCHG ROOM/CARE - ISO PRIVATE

## 2018-02-22 PROCEDURE — 99232 SBSQ HOSP IP/OBS MODERATE 35: CPT | Performed by: INTERNAL MEDICINE

## 2018-02-22 PROCEDURE — 700111 HCHG RX REV CODE 636 W/ 250 OVERRIDE (IP): Performed by: INTERNAL MEDICINE

## 2018-02-22 PROCEDURE — A9270 NON-COVERED ITEM OR SERVICE: HCPCS | Performed by: HOSPITALIST

## 2018-02-22 PROCEDURE — 700111 HCHG RX REV CODE 636 W/ 250 OVERRIDE (IP): Performed by: PHYSICIAN ASSISTANT

## 2018-02-22 RX ORDER — CIPROFLOXACIN 500 MG/1
500 TABLET, FILM COATED ORAL 2 TIMES DAILY WITH MEALS
Qty: 22 TAB | Refills: 0 | Status: SHIPPED | OUTPATIENT
Start: 2018-02-22 | End: 2018-02-26

## 2018-02-22 RX ORDER — LINEZOLID 600 MG/1
600 TABLET, FILM COATED ORAL 2 TIMES DAILY
Qty: 22 TAB | Refills: 0 | Status: SHIPPED | OUTPATIENT
Start: 2018-02-22 | End: 2018-02-26

## 2018-02-22 RX ORDER — ACETAMINOPHEN 325 MG/1
650 TABLET ORAL EVERY 6 HOURS PRN
Qty: 30 TAB | Refills: 0 | Status: SHIPPED | OUTPATIENT
Start: 2018-02-22 | End: 2018-04-24 | Stop reason: SDUPTHER

## 2018-02-22 RX ADMIN — OXYCODONE HYDROCHLORIDE 5 MG: 5 TABLET ORAL at 05:45

## 2018-02-22 RX ADMIN — ACETAMINOPHEN 650 MG: 325 TABLET, FILM COATED ORAL at 13:29

## 2018-02-22 RX ADMIN — LISINOPRIL 40 MG: 20 TABLET ORAL at 09:47

## 2018-02-22 RX ADMIN — ACETAMINOPHEN 650 MG: 325 TABLET, FILM COATED ORAL at 21:01

## 2018-02-22 RX ADMIN — AMLODIPINE BESYLATE 5 MG: 5 TABLET ORAL at 09:47

## 2018-02-22 RX ADMIN — OXYCODONE HYDROCHLORIDE 5 MG: 5 TABLET ORAL at 13:29

## 2018-02-22 RX ADMIN — ENOXAPARIN SODIUM 40 MG: 100 INJECTION SUBCUTANEOUS at 21:01

## 2018-02-22 RX ADMIN — PRAVASTATIN SODIUM 20 MG: 10 TABLET ORAL at 21:00

## 2018-02-22 RX ADMIN — ASPIRIN 81 MG: 81 TABLET, COATED ORAL at 09:47

## 2018-02-22 RX ADMIN — ENOXAPARIN SODIUM 40 MG: 100 INJECTION SUBCUTANEOUS at 09:48

## 2018-02-22 RX ADMIN — FAMOTIDINE 20 MG: 20 TABLET, FILM COATED ORAL at 09:48

## 2018-02-22 RX ADMIN — CIPROFLOXACIN HYDROCHLORIDE 500 MG: 500 TABLET, FILM COATED ORAL at 17:39

## 2018-02-22 RX ADMIN — STANDARDIZED SENNA CONCENTRATE AND DOCUSATE SODIUM 2 TABLET: 8.6; 5 TABLET, FILM COATED ORAL at 21:01

## 2018-02-22 RX ADMIN — LINEZOLID 600 MG: 600 TABLET, FILM COATED ORAL at 09:48

## 2018-02-22 RX ADMIN — LINEZOLID 600 MG: 600 TABLET, FILM COATED ORAL at 21:00

## 2018-02-22 RX ADMIN — CIPROFLOXACIN HYDROCHLORIDE 500 MG: 500 TABLET, FILM COATED ORAL at 09:47

## 2018-02-22 RX ADMIN — DIGOXIN 250 MCG: 250 TABLET ORAL at 09:47

## 2018-02-22 RX ADMIN — FAMOTIDINE 20 MG: 20 TABLET, FILM COATED ORAL at 21:01

## 2018-02-22 RX ADMIN — ONDANSETRON 4 MG: 4 TABLET, ORALLY DISINTEGRATING ORAL at 11:54

## 2018-02-22 RX ADMIN — METOPROLOL TARTRATE 75 MG: 25 TABLET, FILM COATED ORAL at 21:00

## 2018-02-22 ASSESSMENT — PAIN SCALES - GENERAL
PAINLEVEL_OUTOF10: 5
PAINLEVEL_OUTOF10: 3
PAINLEVEL_OUTOF10: 3
PAINLEVEL_OUTOF10: 6
PAINLEVEL_OUTOF10: 4
PAINLEVEL_OUTOF10: 3
PAINLEVEL_OUTOF10: 7

## 2018-02-22 ASSESSMENT — ENCOUNTER SYMPTOMS
SHORTNESS OF BREATH: 0
ABDOMINAL PAIN: 0
FEVER: 0
DIARRHEA: 0
VOMITING: 0
MYALGIAS: 1
CHILLS: 0
NAUSEA: 0

## 2018-02-22 NOTE — CARE PLAN
Problem: Bowel/Gastric:  Goal: Will not experience complications related to bowel motility  Outcome: PROGRESSING AS EXPECTED  Pt reports bowel movement 2/20. Pt reports no complications with bowel movements. Bowel sounds normoactive in all quadrants.     Problem: Knowledge Deficit  Goal: Knowledge of disease process/condition, treatment plan, diagnostic tests, and medications will improve  Outcome: PROGRESSING AS EXPECTED  Reviewed POC including safety, mobility, pain management, medication administration, DVT prophylaxis, and discharge.

## 2018-02-22 NOTE — DISCHARGE PLANNING
Met with pt for discharge planning. Pt has FWW, wants to reume HH with [facility], referral placed. Will need to obtain another prior auth for zyvox as current one expires 2/28/2018.

## 2018-02-22 NOTE — PROGRESS NOTES
Infectious Disease Progress Note    Author: Jane Anderson M.D. Date & Time of service: 2018  2:01 PM    Chief Complaint:  FU right foot abscess    Interval History:   AF, WBc 9.9, had reaction to unasyn earlier today with rash, itching and tongue swelling, now on vancomycin, just returned from I&D, pain controlled, never had cephalosporins per pt   AF, no CBC, had rash, itching and tongue swelling with vanco, abx changed to linezolid which he is tolerating, also tolerating aztreonam   AF, WBC 8.9, pain controlled, tolerating cipro without issues, plan for surgical dressing removal today per pt   AF, no CBC, has pain, wound surgical site with edema and erythema  Labs Reviewed, Medications Reviewed, Radiology Reviewed and Wound Reviewed.    Review of Systems:  Review of Systems   Constitutional: Negative for chills and fever.   Respiratory: Negative for shortness of breath.    Gastrointestinal: Negative for abdominal pain, diarrhea, nausea and vomiting.   Musculoskeletal: Positive for joint pain and myalgias.   Skin: Positive for itching and rash.       Hemodynamics:  Temp (24hrs), Av.3 °C (97.3 °F), Min:36 °C (96.8 °F), Max:36.7 °C (98.1 °F)  Temperature: 36.2 °C (97.2 °F)  Pulse  Av  Min: 50  Max: 77   Blood Pressure: 136/88       Physical Exam:  Physical Exam   Constitutional: He is oriented to person, place, and time. He appears well-developed.   HENT:   Head: Normocephalic and atraumatic.   Eyes: EOM are normal. Pupils are equal, round, and reactive to light.   Neck: Neck supple.   Cardiovascular: Normal rate.    IRR   Pulmonary/Chest: Effort normal and breath sounds normal.   Abdominal: Soft. There is no tenderness.   Musculoskeletal:   R foot surgical site with sutures in place. Scant drainage +edema and erythema   Neurological: He is alert and oriented to person, place, and time.       Meds:    Current Facility-Administered Medications:   •  insulin regular **AND** [CANCELED]  Accu-Chek Q6 if NPO **AND** NOTIFY MD and PharmD **AND** glucose 4 g **AND** dextrose 50%  •  ciprofloxacin  •  ondansetron  •  ondansetron  •  prochlorperazine  •  prochlorperazine  •  enoxaparin (LOVENOX) injection  •  linezolid  •  pravastatin  •  metoprolol  •  lisinopril  •  digoxin  •  aspirin  •  amLODIPine  •  senna-docusate **AND** polyethylene glycol/lytes **AND** magnesium hydroxide **AND** bisacodyl  •  NS  •  acetaminophen  •  Notify provider if pain remains uncontrolled **AND** Use the numeric rating scale (NRS-11) on regular floors and Critical-Care Pain Observation Tool (CPOT) on ICUs/Trauma to assess pain **AND** Pulse Ox (Oximetry) **AND** [DISCONTINUED] Pharmacy Consult Request **AND** If patient difficult to arouse and/or has respiratory depression, stop any opiates that are currently infusing and call a Rapid Response. **AND** oxyCODONE immediate-release **AND** oxyCODONE immediate-release **AND** morphine injection  •  famotidine  •  diphenhydrAMINE    Labs:  Recent Labs      02/21/18   0523   WBC  8.9   RBC  4.16*   HEMOGLOBIN  11.9*   HEMATOCRIT  36.4*   MCV  87.5   MCH  28.6   RDW  52.1*   PLATELETCT  206   MPV  10.4   NEUTSPOLYS  55.60   LYMPHOCYTES  24.20   MONOCYTES  7.70   EOSINOPHILS  11.90*   BASOPHILS  0.30     Recent Labs      02/21/18   0523   SODIUM  137   POTASSIUM  3.8   CHLORIDE  106   CO2  25   GLUCOSE  111*   BUN  15     Recent Labs      02/21/18   0523   CREATININE  1.45*       Imaging:  Dx-chest-for Picc Line Perform Procedure In: Patient's Room    Result Date: 1/20/2018 1/20/2018 11:02 PM HISTORY/REASON FOR EXAM:  PICC line placement. TECHNIQUE/EXAM DESCRIPTION AND NUMBER OF VIEWS: Single view of the chest. COMPARISON: 11/20/2017 FINDINGS: Cardiac mediastinal contour is unchanged. Lungs show hypoinflation. Pulmonary vessels are again mildly prominent. No pleural fluid collection or pneumothorax. LEFT arm PICC line in place with tip at the mid SVC. No major bony abnormality  is seen.     1.  Supportive tubing as described above. 2.  No pneumonia or pneumothorax. 3.  Pulmonary vasculature is again mildly prominent.    Dx-chest-limited (1 View)    Result Date: 2/1/2018 2/1/2018 1:00 PM HISTORY/REASON FOR EXAM:  Chest Pain Right chest pain TECHNIQUE/EXAM DESCRIPTION AND NUMBER OF VIEWS: Single portable view of the chest. COMPARISON: 1/26/2018 FINDINGS: No pulmonary infiltrates or consolidations are noted. No pleural effusion. No pneumothorax. Stable enlarged cardiopericardial silhouette.     Stable cardiomegaly No acute cardiopulmonary abnormalities are identified.    Dx-chest-limited (1 View)    Addendum Date: 1/26/2018    Addendum: Left PICC line is seen terminating overlying the expected location of the proximal superior vena cava    Result Date: 1/26/2018 1/26/2018 3:16 AM HISTORY/REASON FOR EXAM:  PICC line placement TECHNIQUE/EXAM DESCRIPTION:  Single AP view of the chest. COMPARISON: January 20, 2018 FINDINGS: Overlying cardiac leads are present. The heart is in the upper limits of normal for size. The mediastinal contour appears within normal limits.  The central pulmonary vasculature appears normal. The lungs appear well expanded bilaterally.  Bilateral lungs are clear. No significant pleural effusions are identified. The bony structures appear age-appropriate.     1.  No acute cardiopulmonary disease.    Dx-chest-portable (1 View)    Result Date: 2/8/2018 2/8/2018 2:57 PM HISTORY/REASON FOR EXAM:  Chest Pain Mid sternal chest pain. TECHNIQUE/EXAM DESCRIPTION AND NUMBER OF VIEWS: Single portable view of the chest. COMPARISON: 2/1/18 FINDINGS: No pulmonary infiltrates or consolidations are noted. No pleural effusion. No pneumothorax. Stable enlarged cardiopericardial silhouette.     1. Stable cardiomegaly No pulmonary infiltrates or consolidations are noted.    Dx-foot-complete 3+ Right    Result Date: 2/17/2018 2/17/2018 11:04 PM HISTORY/REASON FOR EXAM:  Right foot  infection, open wound TECHNIQUE/EXAM DESCRIPTION AND NUMBER OF VIEWS: 3 views of the RIGHT foot. COMPARISON:  Right foot x-ray 2/2/2018 FINDINGS: There has been first transmetatarsal amputation. The stump appears within normal limits. There is adjacent soft tissue swelling. 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 There are subluxations at multiple metatarsophalangeal joint. There is spurring at the insertion of achilles' tendon and origin of plantar fascia.     1.  Prior first transmetatarsal amputation 2.  Midfoot osteoarthritis 3.  calcaneal spurring    Dx-foot-complete 3+ Right    Result Date: 2/2/2018 2/2/2018 4:40 AM HISTORY/REASON FOR EXAM:  Right foot pain.. TECHNIQUE/EXAM DESCRIPTION AND NUMBER OF VIEWS:  3 views of the RIGHT foot. COMPARISON: 1/15/2018 FINDINGS: There is evidence of amputation through the proximal shaft of the first metatarsal, which has occurred since the prior images. There is generalized soft tissue swelling about the distal foot. No soft tissue gas however. No acute bony abnormality.     No acute bony abnormality.    Mr-foot-with & W/o Right    Result Date: 2/19/2018 2/18/2018 4:39 PM HISTORY/REASON FOR EXAM:  Open wound. Rt Foot pain, Open Wound Mid Foot TECHNIQUE/EXAM DESCRIPTION: MRI of the RIGHT foot with and without contrast. The study was performed on a Nighat 3.0 Taya MRI scanner. T1 axial, T1 sagittal, T1 coronal, STIR sagittal, T2 fast spin-echo fat-suppressed coronal, sagittal inversion recovery, and T1 postcontrast coronal images were obtained. 20 mL Omniscan contrast was administered intravenously. COMPARISON: 1/16/2018. FINDINGS: Interval further amputation of the first ray with small residual first metatarsal stump. There is a deep soft tissue ulcer at the amputation bed with extensive phlegmonous change. There are susceptibility artifact foci within the surgical bed which could relate to gas. There is tenosynovitis of the distal extensor hallucis longus underneath the  ulcer. There is bone marrow replacement in the first metatarsal stump, consistent with osteomyelitis.     MR evidence of osteomyelitis within the first metatarsal stump. Deep soft tissue ulcer at the amputation bed with extensive phlegmonous change and possible gas. No discrete fluid collection seen. There is tenosynovitis of the distal extensor hallucis longus underneath the ulcer.     Echocardiogram-comp W/ Cont    Result Date: 2018  Transthoracic Echo Report Echocardiography Laboratory CONCLUSIONS Technically difficult but adequate study for interpretation. Contrast was used to enhance visualization of the endocardial border. Normal left ventricular chamber size. Normal left ventricular systolic function. Left ventricular ejection fraction is visually estimated to be 60%. Mild concentric left ventricular hypertrophy. Trace mitral regurgitation. Normal left atrial size. Structurally normal aortic valve without significant stenosis or regurgitation. Mild tricuspid regurgitation. Estimated right ventricular systolic pressure  is 35 mmHg. Normal inferior vena cava size and inspiratory collapse. Normal right ventricular size and systolic function. EMELY FLORES Exam Date:         2018                    08:43 Exam Location:     Inpatient Priority:          Routine Ordering Physician:        FABIAN RAMIRES Referring Physician: Sonographer:               Sherry Funes RDCS Age:    52     Gender:    M MRN:    [id number] :    1965 BSA:    2.51   Ht (in):    72     Wt (lb):    295 Exam Type:     Complete, Contrast Indications:     Paroxysmal atrial fibrillation ICD Codes:       I480 CPT Codes:       06177,  BP:   145    /   101    HR:   110 Technical Quality:       Technically difficult study -                          adequate information is obtained MEASUREMENTS  (Male / Female) Normal Values 2D ECHO LV Diastolic Diameter PLAX        4.4 cm                4.2 - 5.9 / 3.9 - 5.3 cm LV Systolic  Diameter PLAX         2.5 cm                2.1 - 4.0 cm IVS Diastolic Thickness           1.5 cm                LVPW Diastolic Thickness          1.1 cm                RV Diameter 4C                    3.2 cm                2.5 - 2.9 cm LVOT Diameter                     2.1 cm                RA Diameter                       3.6 cm                Estimated LV Ejection Fraction    60 %                  LV Ejection Fraction MOD BP       54.7 %                >= 55  % LV Ejection Fraction MOD 4C       64.7 %                LV Ejection Fraction MOD 2C       55.5 %                LA Volume Index                   28.8 cm³/m²           16 - 28 cm³/m² IVC Diameter                      1.5 cm                M-MODE Aortic Root Diameter MM           3.9 cm                DOPPLER AV Peak Velocity                  1.3 m/s               AV Peak Gradient                  7 mmHg                AV Mean Gradient                  3.5 mmHg              LVOT Peak Velocity                1.2 m/s               AV Area Cont Eq vti               3 cm²                 TR Peak Velocity                  239 cm/s              PV Peak Velocity                  0.91 m/s              PV Peak Gradient                  3.3 mmHg              RVOT Peak Velocity                0.69 m/s              * Indicates values subject to auto-interpretation LV EF:  60    % FINDINGS Left Ventricle 3 mL of contrast was administered. Contrast was used to enhance visualization of the endocardial border. Existing IV was used. Normal left ventricular chamber size. Mild concentric left ventricular hypertrophy. Normal left ventricular systolic function. Left ventricular ejection fraction is visually estimated to be 60%. Diastolic function is difficult to assess with atrial fibrillation. Right Ventricle Normal right ventricular size and systolic function. Right Atrium Normal right atrial size. Normal inferior vena cava size and inspiratory collapse. Left Atrium Normal  left atrial size. Mitral Valve Thickened mitral valve leaflets. Trace mitral regurgitation. Aortic Valve Structurally normal aortic valve without significant stenosis or regurgitation. Tricuspid Valve Mild tricuspid regurgitation. Estimated right ventricular systolic pressure  is 35 mmHg. Pulmonic Valve Structurally normal pulmonic valve without significant stenosis or regurgitation. Pericardium Normal pericardium without effusion. Aorta Aortic root dilatation measuring 3.9 cm. Rocio Ross M.D. (Electronically Signed) Final Date:     2018                 14:23    Le Venous Duplex (specify In Comments Left, Right Or Bilateral)    Result Date: 2018   Vascular Laboratory  CONCLUSIONS  Normal bilateral superficial and deep venous examination of the right leg.  EMELY FLORES  Exam Date:     2018 04:36  Room #:     Inpatient  Priority:     Routine  Ht (in):             Wt (lb):  Ordering Physician:        SHIRLEY WILLIAMSON  Referring Physician:       [id number] Sultana  Sonographer:               YOKASTA troncoso rvt  Study Type:  Technical Quality:         Adequate  Age:    52    Gender:     M  MRN:    [id number]  :    1965      BSA:  Indications:     RT LEG SWELLING  CPT Codes:  ICD Codes:  History:         Swelling of the rt limb  Limitations:     body habitus in calf  PROCEDURES:  rt lower ext venous doppler  FINDINGS:  Complete color filling and compressibility with normal venous flow dynamics  including spontaneous flow, response to augmentation maneuvers, and  respiratory phasicity.  The peroneal and posterior tibial veins are difficult to assess for  compressibility, but flow response to augmentation is demonstrated.  GSV DST THIGH Densely echogenic material is within  the vein that is  consistent with chronic venous thrombosis. FLOW IS SEEN  ___vein is partially compressible.  AREA OF PAIN - edema obscures the visualization of the  lat/post calf  Germain Craven  (Electronically Signed)   Final Date:      2018                   07:11    Nm-cardiac Stress Test    Addendum Date: 2018    Baseline ECG:atrial fibrillation Stress ECG: No ischemic T wave changes with peak stress Impression: Normal stress ECG, see nuclear images     Result Date: 2018   Myocardial Perfusion  Report  NUCLEAR IMAGING INTERPRETATION  No evidence of significant jeopardized viable myocardium or prior myocardial  infarction.  Normal left ventricular size, ejection fraction, and wall motion.  EMELY FLORES  MRN:    [id number]         Gender:    M  Exam Date: 2018 06:35  Exam Location:      Inpatient  Ordering Phys:     ALETHEA MASSEY  NucMed Tech:       Clarice Escoto, RT  Age:    52    :    1965        Ht (in):     72  Wt (lb):     298    BMI:    40.37       Radiologist  Risk Factors:             Family history of coronary disease, Diabetes,                            Hypertension  Indications:              Shortness of breath, Chest pain, unspecified  ICD Codes:                  R079  Cardiac History:          None reported  Cardiac Meds:  Meds Past 24 hrs:  Pretest Chest Pain:  STRESS TEST      Pharmacologjesse Agrawal   Lexiscan       Dose: 0.4 mg  ol:  Post-Injection Exercise:  Resting HR (bpm):      95  Peak HR (bpm):         109  Resting BP (mmHg):       122    /   77  Peak BP (mmHg):       131   /   92  MaxPHR:     166     Target HR (bpm):       141  % MaxPHR:     66  Double Product:       92761  BP Response:  Stress Termination:       Completed Protocol  Stress Symptoms:  Arrhythmia: None Chest Pain: None Dyspnea Flushing, Headache, Somach Cramps,  Leg Fatigue  ECG  Resting ECG:  Stress ECG:  IMAGE PROTOCOL      Rest/Stress 1                      Day          RadiopharmaceuticalDose (mCi)   Imaging  Date      Imaging  Time         Inj to Img Time (min)  Rest:   Tc-99m             7.4          2018        13:36          Tetrofosmin  Stress:  Tc-99m             25.0         29-Jan-2018        14:27          Tetrofosmin  Rest:  Administration Site:       Left forearm  Administered by:      RT Rasheeda  Stress:  Administration Site:       Left forearm  Administered by:      Graeme Joaquin RN  % Percent HR Achieved:  SPECT RESULTS  Technical Quality:       Good  Raw Data Analysis:  Summed Stress Score:    2  Summed Rest Score:    2  Summed Difference Score:        2  PERFUSION:  Normal myocardial perfusion with no ischemia.  FUNCTIONAL RESULTS (calculated via Gated SPECT)  Stress Image LV EF:        64     %  Upper Normal Limit  Stress EDV:      92     ml   EDVI:    36      ml/m²  Stress ESV:      33     ml   ESVI:    13      ml/m²  TID:    0.96   TID - 1.19      TID (ed) - 1.23  LV Function:  Normal left ventricular wall motion.  LV ejection fraction = 64%.  Shmuel Rai  (Electronically Signed)  Final Date:      29 January 2018                   15:38    Ct-cta Chest Pulmonary Artery W/ Recons    Result Date: 2/2/2018 2/2/2018 4:04 AM HISTORY/REASON FOR EXAM:  Pain TECHNIQUE/EXAM DESCRIPTION: CT angiogram scan for pulmonary embolism with contrast, with reconstructions. 1.25 mm helical sections were obtained from the lung apices through the lung bases following the rapid bolus administration of 90 mL of Omnipaque 350 nonionic contrast. Thin-section overlapping reconstruction interval was utilized. Coronal reconstructions were generated from the axial data. MIP post processing was performed and utilized for the interpretation. Low dose optimization technique was utilized for this CT exam including automated exposure control and adjustment of the mA and/or kV according to patient size. COMPARISON: None FINDINGS: Pulmonary Embolism: No. Lungs: No suspicious nodules or air space process. Pleura: No pleural effusion. Nodes: No enlarged lymph nodes. Additional findings: There are multiple focal gas lucencies within the gallbladder, consistent  with known opaque gallstones. Localized increased opacity is seen in the retroareolar regions of both breasts, consistent with gynecomastia..     1.  No evidence of pulmonary emboli or other acute intrathoracic abnormality. 2.  Cholelithiasis. 3.  Gynecomastia.       Micro:  Results     Procedure Component Value Units Date/Time    CULTURE TISSUE W/ GRM STAIN [[id number]]  (Abnormal) Collected:  02/19/18 1223    Order Status:  Completed Specimen:  Tissue Updated:  02/21/18 0827     Gram Stain Result Few Gram positive cocci. (A)     Significant Indicator POS (POS)     Source TISS     Site Right Foot     Tissue Culture -- (A)     Tissue Culture -- (A)     Methicillin Resistant Staphylococcus aureus  Moderate growth  See previous culture for sensitivity report.      ANAEROBIC CULTURE [[id number]] Collected:  02/19/18 1223    Order Status:  Completed Specimen:  Tissue Updated:  02/21/18 0827     Significant Indicator NEG     Source TISS     Site Right Foot     Anaerobic Culture, Culture Res Culture in progress.    FUNGAL CULTURE [[id number]] Collected:  02/19/18 1223    Order Status:  Completed Specimen:  Tissue Updated:  02/21/18 0827     Significant Indicator NEG     Source TISS     Site Right Foot     Fungal Culture Culture in progress.    AFB CULTURE [[id number]] Collected:  02/19/18 1223    Order Status:  Completed Specimen:  Tissue Updated:  02/21/18 0827     Significant Indicator NEG     Source TISS     Site Right Foot     AFB Culture Culture in progress.     AFB Smear Results No acid fast bacilli seen.    GRAM STAIN [[id number]] Collected:  02/19/18 1223    Order Status:  Completed Specimen:  Tissue Updated:  02/20/18 2107     Significant Indicator .     Source TISS     Site Right Foot     Gram Stain Result Few Gram positive cocci.    ACID FAST STAIN [[id number]] Collected:  02/19/18 1223    Order Status:  Completed Specimen:  Tissue Updated:  02/20/18 2107     Significant Indicator NEG     Source TISS     Site Right  Foot     AFB Smear Results No acid fast bacilli seen.    CULTURE WOUND W/ GRAM STAIN [[id number]]  (Abnormal)  (Susceptibility) Collected:  02/17/18 3481    Order Status:  Completed Specimen:  Wound from Right Foot Updated:  02/20/18 0933     Gram Stain Result --     Few WBCs.  Moderate Gram positive cocci.       Significant Indicator POS (POS)     Source WND     Site RIGHT FOOT     Culture Result Wound -- (A)     Culture Result Wound -- (A)     Methicillin Resistant Staphylococcus aureus  Heavy growth  This isolate is presumed to be clindamycin resistant based on  detection of inducible resistance.  Clindamycin may still  be effective in some patients.       Culture Result Wound -- (A)     Proteus mirabilis  Light growth      Culture & Susceptibility     METHICILLIN RESISTANT STAPHYLOCOCCUS AUREUS     Antibiotic Sensitivity Microscan Unit Status    Ampicillin/sulbactam Resistant 16/8 mcg/mL Final    Clindamycin Resistant 1 mcg/mL Final    Daptomycin Sensitive 1 mcg/mL Final    Erythromycin Resistant >4 mcg/mL Final    Moxifloxacin Resistant >4 mcg/mL Final    Oxacillin Resistant >2 mcg/mL Final    Penicillin Resistant >8 mcg/mL Final    Tetracycline Sensitive <=4 mcg/mL Final    Trimeth/Sulfa Sensitive <=0.5/9.5 mcg/mL Final    Vancomycin Sensitive 2 mcg/mL Final              PROTEUS MIRABILIS     Antibiotic Sensitivity Microscan Unit Status    Ampicillin Sensitive <=8 mcg/mL Final    Cefepime Sensitive <=8 mcg/mL Final    Cefotaxime Sensitive <=2 mcg/mL Final    Cefotetan Sensitive <=16 mcg/mL Final    Ceftazidime Sensitive <=1 mcg/mL Final    Ceftriaxone Sensitive <=8 mcg/mL Final    Cefuroxime Sensitive <=4 mcg/mL Final    Ciprofloxacin Sensitive <=1 mcg/mL Final    Ertapenem Sensitive <=1 mcg/mL Final    Gentamicin Sensitive <=4 mcg/mL Final    Pip/Tazobactam Sensitive <=16 mcg/mL Final    Tobramycin Sensitive <=4 mcg/mL Final    Trimeth/Sulfa Sensitive <=2/38 mcg/mL Final                       GRAM STAIN  "[[id number]] Collected:  02/17/18 2236    Order Status:  Completed Specimen:  Wound Updated:  02/18/18 1805     Significant Indicator .     Source WND     Site RIGHT FOOT     Gram Stain Result --     Few WBCs.  Moderate Gram positive cocci.      BLOOD CULTURE [[id number]] Collected:  02/17/18 2245    Order Status:  Completed Specimen:  Blood from Peripheral Updated:  02/18/18 1048     Significant Indicator NEG     Source BLD     Site PERIPHERAL     Blood Culture --     No Growth    Note: Blood cultures are incubated for 5 days and  are monitored continuously.Positive blood cultures  are called to the RN and reported as soon as  they are identified.      Narrative:       Per Hospital Policy: Only change Specimen Src: to \"Line\" if  specified by physician order.    BLOOD CULTURE [[id number]] Collected:  02/17/18 2316    Order Status:  Completed Specimen:  Blood from Peripheral Updated:  02/18/18 1048     Significant Indicator NEG     Source BLD     Site PERIPHERAL     Blood Culture --     No Growth    Note: Blood cultures are incubated for 5 days and  are monitored continuously.Positive blood cultures  are called to the RN and reported as soon as  they are identified.      Narrative:       Per Hospital Policy: Only change Specimen Src: to \"Line\" if  specified by physician order.          Assessment:  Active Hospital Problems    Diagnosis   • *Diabetic foot infection (CMS-[facility]) [E11.69, L08.9]   • Type 2 diabetes mellitus (CMS-[facility]) [E11.9]   • HLD (hyperlipidemia) [E78.5]   • HTN (hypertension) [I10]   • Atrial fibrillation (CMS-[facility]) [I48.91]       Plan:  Right foot abscess, not improving  Afebrile  Leukocytosis resolved  S/p I&D on 2/19. Multiple loculations were noted intraop  OR gram stain +GPC, cx MRSA  Wound cx - MRSA (vanco ISAMAR 2) and proteus  Prior cx +MRSA, efaecalis  Continue cipro  Continue linezolid due to reaction from vanco  Wound care   Anticipate 2 weeks of abx from 2/19  Stop date 03/05/18 - extend if " clinically needed  Surgical site with signs of infection    Drug rash and tongue swelling. Resolved   2/2 unasyn and also vanco  DC'd unasyn    DM2  hgA1c 6.8  Maintain BS less than 150 to control infection    Hold discharge until surgical site clinically improved

## 2018-02-22 NOTE — FACE TO FACE
Face to Face Note  -  Durable Medical Equipment    Aisha Mixon M.D. - NPI: [id number]  I certify that this patient is under my care and that they have had a durable medical equipment(DME)face to face encounter by myself that meets the physician DME face-to-face encounter requirements with this patient on:    Date of encounter:   Patient:                    MRN:                       YOB: 2020  Sebastián Castro  [id number]  1965     The encounter with the patient was in whole, or in part, for the following medical condition, which is the primary reason for durable medical equipment:  Wound Care and Other - DM ulcer    I certify that, based on my findings, the following durable medical equipment is medically necessary:  Walkers.    HOME O2 Saturation Measurements:(Values must be present for Home Oxygen orders)         ,     ,         My Clinical findings support the need for the above equipment due to:  Abnormal Gait and ulcer    Supporting Symptoms: DM ulcer     ------------------------------------------------------------------------------------------------------------------    Face to Face Supporting Documentation - Home Health    The encounter with this patient was in whole or in part the primary reason for home health admission.    Date of encounter:   Patient:                    MRN:                       YOB: 2020  Sebastián Castro  [id number]  1965     Home health to see patient for:  Skilled Nursing care for assessment, interventions & education, Wound Care and Physical Therapy evaluation and treatment    Skilled need for:  Surgical Aftercare DM ulcer    Skilled nursing interventions to include:  Wound Care    Homebound evidenced status by:  Need the aid of supportive devices such as crutches, canes, wheelchairs or walkers. Leaving home must require a considerable and taxing effort. There must exist a normal inability to leave the home.    Community Physician to  provide follow up care: Jerad Lomax M.D.     Optional Interventions    Wound information & treatment:    Home Infusion Therapy orders:    Line/Drain/Airway:    I certify the face to face encounter for this home care referral meets the CMS requirements and the encounter/clinical assessment with the patient was, in whole, or in part, for the medical condition(s) listed above, which is the primary reason for home health care. Based on my clinical findings: the service(s) are medically necessary, support the need for home health care, and the homebound criteria are met.  I certify that this patient has had a face to face encounter by myself.  Aisha Mixon M.D. - NPI: [id number]    *Debility, frailty and advanced age in the absence of an acute deterioration or exacerbation of a condition do not qualify a patient for home health.

## 2018-02-22 NOTE — THERAPY
"Occupational Therapy Evaluation completed.   Functional Status:  Pt very pleasant & co-operative.  Pt currently able to perform basic ADL's & functional mobility with supervision.  Pt able to don RLE boot & left off loading shoe.  Pt reports he has good support from his friends in home environment.  Plan of Care: Patient with no further skilled OT needs in the acute care setting at this time, pt appears to be functioning at his baseline.  Discharge Recommendations:  Equipment: No Equipment Needed. Post-acute therapy Discharge to home with outpatient or home health for additional skilled therapy services.    See \"Rehab Therapy-Acute\" Patient Summary Report for complete documentation.    "

## 2018-02-22 NOTE — DISCHARGE SUMMARY
Hospital Medicine Discharge Note     Admit Date:  2/17/2018       Discharge Date:   2/22/2018    Attending Physician:  Aisha Mixon     Diagnoses (includes active and resolved):       Diabetic foot infection (CMS-[facility]) POA: Yes    Type 2 diabetes mellitus (CMS-[facility]) POA: Yes    HLD (hyperlipidemia) POA: Yes    Atrial fibrillation (CMS-[facility]) POA: Yes    HTN (hypertension) POA: Yes      Chief Complaint   Patient presents with   • Wound Check     Hx of diabetic ulcer to right foot with amputation in November 2017, right found red, open wound to amputation site       Hosiery of Present Illness  Patient is a 53-year-old gentleman with diabetes admitted for unhealed diabetic ulcer on the right foot.   Detailed info pls see H&P.    Hospital Summary (Brief Narrative):       53 y.o. male who presented on 2/17/2018 with right foot pain and swelling at the surgical site. Patient was noticed to have multiple loculated abscess on the right foot. Orthopedics was consulted and recommended patient and the per patient received irrigation and debridement for his multiple loculated abscess on the right throat. Patient tolerated procedure very well. Patient was then seen by infectious disease specialist and recommended patient to be treated with oral linezolid and Cipro. Patient's culture grows OR gram stain +GPC, cx MRSA, but his Wound cx - MRSA (vanco ISAMAR 2) and proteus. Patient was recommended to be treated by oral antibiotics for 2 weeks. Patient does have reaction to vancomycin so he was treated with linezolid and by mouth Cipro. Anticipated stop date March 5. Patient evaluated by PT OT and will receive home health at home.    Consultants:      Ortho  ID    Procedures:        Irrigation and debridement, multiloculated abscess, right foot.    Discharge Medications:        (x)  Medication Reconciliation Completed       Medication List      START taking these medications      Instructions   acetaminophen 325 MG Tabs  Commonly known as:   TYLENOL   Take 2 Tabs by mouth every 6 hours as needed (Mild Pain; (Pain scale 1-3); Temp greater than 100.5 F).  Dose:  650 mg     ciprofloxacin 500 MG Tabs  Commonly known as:  CIPRO   Take 1 Tab by mouth 2 times a day, with meals for 11 days.  Dose:  500 mg        CONTINUE taking these medications      Instructions   amLODIPine 5 MG Tabs  Commonly known as:  NORVASC   Take 1 Tab by mouth every day.  Dose:  5 mg     aspirin 81 MG EC tablet   Take 1 Tab by mouth every day.  Dose:  81 mg     digoxin 250 MCG Tabs  Commonly known as:  LANOXIN   Take 250 mcg by mouth every day.  Dose:  250 mcg     famotidine 20 MG Tabs  Commonly known as:  PEPCID   Take 20 mg by mouth 2 times a day.  Dose:  20 mg     linezolid 600 MG Tabs  Commonly known as:  ZYVOX   Take 1 Tab by mouth 2 times a day for 11 days.  Dose:  600 mg     lisinopril 40 MG tablet  Commonly known as:  PRINIVIL, ZESTRIL   Take 1 Tab by mouth every day.  Dose:  40 mg     metFORMIN 500 MG Tabs  Commonly known as:  GLUCOPHAGE   Take 2 Tabs by mouth 2 times a day, with meals.  Dose:  1000 mg     Metoprolol Tartrate 75 MG Tabs   Take 75 mg by mouth 2 Times a Day.  Dose:  75 mg     pravastatin 20 MG Tabs  Commonly known as:  PRAVACHOL   Take 1 Tab by mouth every evening.  Dose:  20 mg     rivaroxaban 20 MG Tabs tablet  Commonly known as:  XARELTO   Take 1 Tab by mouth with dinner.  Dose:  20 mg            Disposition:   Discharge home    Diet:   Diabetic    Activity:   As tolerated    Code status:   Full code    Primary Care Provider:    Jerad Lomax M.D.    Follow up appointment details :      PCP in 2 weeks  No follow-up provider specified.  Future Appointments  Date Time Provider Department Center   3/1/2018 9:30 AM Jerad Lomax M.D. [facility]   3/7/2018 8:30 AM Rodolfo Sanchez M.D. [facility] None       Pending Studies:        None    Time spent on discharge day patient visit: >35 minutes    #################################################    Interval  history/exam for day of discharge:    Vitals:    02/21/18 1600 02/21/18 2047 02/22/18 0340 02/22/18 0800   BP: 115/50 111/46 105/49 136/88   Pulse: 70 (!) 56 64 (!) 55   Resp: 16 18 18 16   Temp: 36.7 °C (98.1 °F) 36 °C (96.8 °F) 36.1 °C (97 °F) 36.2 °C (97.2 °F)   SpO2: 94% 92% 95% 98%   Weight:       Height:         Weight/BMI: Body mass index is 39.86 kg/m².  Pulse Oximetry: 98 %, O2 (LPM): 0, O2 Delivery: None (Room Air)    Gen: AAOx3, NAD  Eyes: PELLA  Neck: no JVD, no lymphadenopathy  Cardia: RRR, no mrg  Lungs: CTAB, no rales, rhonci or wheezing  Abd: NABS, soft, non extended, no mass  EXT: No C/C/E, peripheral pulse 2+ b/l, wound healing properly  Neuro: CN II-XII intact, non focal, reflex 2+ symmetrical  Skin: Intact, no lesion, warm  Psych: Appropriate.    Most Recent Labs:    Lab Results   Component Value Date/Time    WBC 8.9 02/21/2018 05:23 AM    RBC 4.16 (L) 02/21/2018 05:23 AM    HEMOGLOBIN 11.9 (L) 02/21/2018 05:23 AM    HEMATOCRIT 36.4 (L) 02/21/2018 05:23 AM    MCV 87.5 02/21/2018 05:23 AM    MCH 28.6 02/21/2018 05:23 AM    MCHC 32.7 (L) 02/21/2018 05:23 AM    MPV 10.4 02/21/2018 05:23 AM    NEUTSPOLYS 55.60 02/21/2018 05:23 AM    LYMPHOCYTES 24.20 02/21/2018 05:23 AM    MONOCYTES 7.70 02/21/2018 05:23 AM    EOSINOPHILS 11.90 (H) 02/21/2018 05:23 AM    BASOPHILS 0.30 02/21/2018 05:23 AM      Lab Results   Component Value Date/Time    SODIUM 137 02/21/2018 05:23 AM    POTASSIUM 3.8 02/21/2018 05:23 AM    CHLORIDE 106 02/21/2018 05:23 AM    CO2 25 02/21/2018 05:23 AM    GLUCOSE 111 (H) 02/21/2018 05:23 AM    BUN 15 02/21/2018 05:23 AM    CREATININE 1.45 (H) 02/21/2018 05:23 AM      Lab Results   Component Value Date/Time    ALTSGPT 11 02/09/2018 12:37 AM    ASTSGOT 14 02/09/2018 12:37 AM    ALKPHOSPHAT 53 02/09/2018 12:37 AM    TBILIRUBIN 0.7 02/09/2018 12:37 AM    LIPASE 10 (L) 02/08/2018 02:00 PM    ALBUMIN 3.2 02/09/2018 12:37 AM    GLOBULIN 2.9 02/09/2018 12:37 AM    PREALBUMIN 12.0 (L)  01/15/2018 11:56 AM    INR 2.29 (H) 02/08/2018 02:00 PM     Lab Results   Component Value Date/Time    PROTHROMBTM 24.9 (H) 02/08/2018 02:00 PM    INR 2.29 (H) 02/08/2018 02:00 PM        Imaging/ Testing:      MR-FOOT-WITH & W/O RIGHT   Final Result            MR evidence of osteomyelitis within the first metatarsal stump.      Deep soft tissue ulcer at the amputation bed with extensive phlegmonous change and possible gas. No discrete fluid collection seen. There is tenosynovitis of the distal extensor hallucis longus underneath the ulcer.         DX-FOOT-COMPLETE 3+ RIGHT   Final Result      1.  Prior first transmetatarsal amputation      2.  Midfoot osteoarthritis      3.  calcaneal spurring          Instructions:      The patient was instructed to return to the ER in the event of worsening symptoms. I have counseled the patient on the importance of compliance and the patient has agreed to proceed with all medical recommendations and follow up plan indicated above.   The patient understands that all medications come with benefits and risks. Risks may include permanent injury or death and these risks can be minimized with close reassessment and monitoring.        This dictation was created using voice recognition software. The accuracy of the dictation is limited to the abilities of the software. Although every efforts have been used to decrease the error, I expect there may be some errors of grammar and possibly content.

## 2018-02-22 NOTE — PROGRESS NOTES
LIMB PRESERVATION SERVICE     Pt well known to LPS.   54 y/o male with DM, obesity, HTN, and peripheral neuropathy. Admitted for new ulcer to R medial foot incision. S/p R 1st ray amputation on 1/17/18 by Dr. Delong  Also presents with DTI x2 to R lateral foot, POA and L great toe ulcer which has resolved since last admission.      POD #3 s/p I and D, multiloculated abscess of R foot by Dr. Lopez   /88   Pulse (!) 55   Temp 36.2 °C (97.2 °F)   Resp 16   Ht 1.829 m (6')   Wt (!) 133.3 kg (293 lb 14 oz)   SpO2 98%   BMI 39.86 kg/m²   C/o N/V. RN notified. Medicated, saltines given  Increased pain to R foot, unable to tolerate light touch      R medial foot incision:  Approximated with sutures   Increased s/s drainage  Skin macerated  Foot hot with edema and erythema    R lateral foot DTI, POA x 2  Black eschar in place  Manually removed loose edges      leander and offloading boot at bedside  Notified Dr. Lopez. No plans for further surgery. Continue wound care and abx.     ID involved  zyvox and cipro       PLAN  drsg orders placed for nursing, aquacel ag, foam, kerlix, ace wrap  Continue abx per ID  Offloading boot when OOB RLE HWB      D/C plan: home with HH. Recommend hold DC today      D/W: pt, RN, Dr. Lopez, Dr. Anderson

## 2018-02-22 NOTE — DISCHARGE PLANNING
ATTN: Case Management  RE: Referral for Home Health                We would like to take this opportunity to thank you for submitting a referral for your patient to continue care with [facility]. Our skilled team is dedicated to helping all patients recover and gain independence in the home setting.            As of 2/22/18, we have accepted the above patient into our service. A [facility] clinician will be out to see the patient within 48 hours to conduct our initial visit. If you have any questions or concerns regarding the patient’s transition to Home Health, please do not hesitate to contact us. We are open for referrals 7 days a week from 8AM to 5PM at [phone].      We look forward to collaborating with you,  [facility] Team

## 2018-02-22 NOTE — DISCHARGE PLANNING
Pt has RX for Linezolid 600mg po  1 tab BID X 11 days # 22. Called [facility] . Spoke to [facility] [employer], she states pt has RX for Linezolid awaiting in pharmacy that was not picked up. Has already been prior auth'd.  [facility]. Waiting for med clearance for discharge to home with home health.

## 2018-02-23 ENCOUNTER — APPOINTMENT (OUTPATIENT)
Dept: WOUND CARE | Facility: MEDICAL CENTER | Age: 53
End: 2018-02-23
Attending: ORTHOPAEDIC SURGERY
Payer: MEDICAID

## 2018-02-23 LAB
BACTERIA BLD CULT: NORMAL
BACTERIA BLD CULT: NORMAL
GLUCOSE BLD-MCNC: 112 MG/DL (ref 65–99)
GLUCOSE BLD-MCNC: 139 MG/DL (ref 65–99)
GLUCOSE BLD-MCNC: 150 MG/DL (ref 65–99)
GLUCOSE BLD-MCNC: 152 MG/DL (ref 65–99)
GLUCOSE BLD-MCNC: 152 MG/DL (ref 65–99)
SIGNIFICANT IND 70042: NORMAL
SIGNIFICANT IND 70042: NORMAL
SITE SITE: NORMAL
SITE SITE: NORMAL
SOURCE SOURCE: NORMAL
SOURCE SOURCE: NORMAL

## 2018-02-23 PROCEDURE — 700102 HCHG RX REV CODE 250 W/ 637 OVERRIDE(OP): Performed by: INTERNAL MEDICINE

## 2018-02-23 PROCEDURE — 700102 HCHG RX REV CODE 250 W/ 637 OVERRIDE(OP): Performed by: HOSPITALIST

## 2018-02-23 PROCEDURE — A9270 NON-COVERED ITEM OR SERVICE: HCPCS | Performed by: INTERNAL MEDICINE

## 2018-02-23 PROCEDURE — 82962 GLUCOSE BLOOD TEST: CPT | Mod: 91

## 2018-02-23 PROCEDURE — 770021 HCHG ROOM/CARE - ISO PRIVATE

## 2018-02-23 PROCEDURE — 99232 SBSQ HOSP IP/OBS MODERATE 35: CPT | Performed by: INTERNAL MEDICINE

## 2018-02-23 PROCEDURE — A9270 NON-COVERED ITEM OR SERVICE: HCPCS | Performed by: HOSPITALIST

## 2018-02-23 PROCEDURE — 700111 HCHG RX REV CODE 636 W/ 250 OVERRIDE (IP): Performed by: INTERNAL MEDICINE

## 2018-02-23 PROCEDURE — 700111 HCHG RX REV CODE 636 W/ 250 OVERRIDE (IP): Performed by: PHYSICIAN ASSISTANT

## 2018-02-23 RX ORDER — LABETALOL HYDROCHLORIDE 5 MG/ML
5 INJECTION, SOLUTION INTRAVENOUS EVERY 4 HOURS PRN
Status: DISCONTINUED | OUTPATIENT
Start: 2018-02-23 | End: 2018-02-26 | Stop reason: HOSPADM

## 2018-02-23 RX ORDER — AMLODIPINE BESYLATE 10 MG/1
10 TABLET ORAL DAILY
Status: DISCONTINUED | OUTPATIENT
Start: 2018-02-24 | End: 2018-02-24

## 2018-02-23 RX ADMIN — LINEZOLID 600 MG: 600 TABLET, FILM COATED ORAL at 09:33

## 2018-02-23 RX ADMIN — FAMOTIDINE 20 MG: 20 TABLET, FILM COATED ORAL at 20:53

## 2018-02-23 RX ADMIN — LINEZOLID 600 MG: 600 TABLET, FILM COATED ORAL at 20:53

## 2018-02-23 RX ADMIN — ENOXAPARIN SODIUM 40 MG: 100 INJECTION SUBCUTANEOUS at 09:33

## 2018-02-23 RX ADMIN — PROCHLORPERAZINE MALEATE 5 MG: 10 TABLET, FILM COATED ORAL at 12:15

## 2018-02-23 RX ADMIN — ASPIRIN 81 MG: 81 TABLET, COATED ORAL at 09:34

## 2018-02-23 RX ADMIN — FAMOTIDINE 20 MG: 20 TABLET, FILM COATED ORAL at 06:04

## 2018-02-23 RX ADMIN — METOPROLOL TARTRATE 75 MG: 25 TABLET, FILM COATED ORAL at 20:53

## 2018-02-23 RX ADMIN — ACETAMINOPHEN 650 MG: 325 TABLET, FILM COATED ORAL at 09:34

## 2018-02-23 RX ADMIN — ENOXAPARIN SODIUM 40 MG: 100 INJECTION SUBCUTANEOUS at 20:54

## 2018-02-23 RX ADMIN — ONDANSETRON HYDROCHLORIDE 4 MG: 2 INJECTION, SOLUTION INTRAMUSCULAR; INTRAVENOUS at 16:38

## 2018-02-23 RX ADMIN — METOPROLOL TARTRATE 75 MG: 25 TABLET, FILM COATED ORAL at 09:34

## 2018-02-23 RX ADMIN — ONDANSETRON 4 MG: 4 TABLET, ORALLY DISINTEGRATING ORAL at 09:34

## 2018-02-23 RX ADMIN — CIPROFLOXACIN HYDROCHLORIDE 500 MG: 500 TABLET, FILM COATED ORAL at 17:19

## 2018-02-23 RX ADMIN — STANDARDIZED SENNA CONCENTRATE AND DOCUSATE SODIUM 2 TABLET: 8.6; 5 TABLET, FILM COATED ORAL at 09:33

## 2018-02-23 RX ADMIN — AMLODIPINE BESYLATE 5 MG: 5 TABLET ORAL at 09:33

## 2018-02-23 RX ADMIN — DIGOXIN 250 MCG: 250 TABLET ORAL at 09:34

## 2018-02-23 RX ADMIN — LISINOPRIL 40 MG: 20 TABLET ORAL at 09:34

## 2018-02-23 RX ADMIN — CIPROFLOXACIN HYDROCHLORIDE 500 MG: 500 TABLET, FILM COATED ORAL at 09:34

## 2018-02-23 RX ADMIN — PRAVASTATIN SODIUM 20 MG: 10 TABLET ORAL at 20:53

## 2018-02-23 ASSESSMENT — ENCOUNTER SYMPTOMS
EYE PAIN: 0
PHOTOPHOBIA: 0
CHILLS: 0
DIARRHEA: 0
EYES NEGATIVE: 1
SHORTNESS OF BREATH: 0
PALPITATIONS: 0
MYALGIAS: 1
PSYCHIATRIC NEGATIVE: 1
GASTROINTESTINAL NEGATIVE: 1
CARDIOVASCULAR NEGATIVE: 1
RESPIRATORY NEGATIVE: 1
CONSTIPATION: 0
SPUTUM PRODUCTION: 0
NAUSEA: 1
ABDOMINAL PAIN: 0
HEARTBURN: 0
NEUROLOGICAL NEGATIVE: 1
VOMITING: 0
WEIGHT LOSS: 0
BLURRED VISION: 0
CONSTITUTIONAL NEGATIVE: 1
FEVER: 0
CLAUDICATION: 1

## 2018-02-23 ASSESSMENT — LIFESTYLE VARIABLES: DO YOU DRINK ALCOHOL: NO

## 2018-02-23 ASSESSMENT — PAIN SCALES - GENERAL
PAINLEVEL_OUTOF10: 5
PAINLEVEL_OUTOF10: 0
PAINLEVEL_OUTOF10: 0

## 2018-02-23 NOTE — CARE PLAN
Problem: Communication  Goal: The ability to communicate needs accurately and effectively will improve  Outcome: PROGRESSING AS EXPECTED  Patient encouraged to ask questions related to care.     Problem: Infection  Goal: Will remain free from infection  Outcome: PROGRESSING AS EXPECTED      Problem: Pain Management  Goal: Pain level will decrease to patient's comfort goal  Outcome: PROGRESSING AS EXPECTED  Rest, ice pack and PRN pain medications offered for pain control.

## 2018-02-23 NOTE — PROGRESS NOTES
Assumed care of patient following shift report. Patient is A&Ox4. He is lying in bed. Dressing to right heel is CDI. Patient complains of mild pain for which tylenol was administered. Plan of care and safety precautions reviewed. Call device in reach. Will continue to monitor.

## 2018-02-23 NOTE — PROGRESS NOTES
[facility] Hospitalist Progress Note    Date of Service: 2/22/2018    Chief Complaint  53 y.o. male with history of right great toe amputation in November his wound cultures positive for MRSA and clinical course, was noncompliant with oral Zyvox, admitted 2/17/2018 with worsening of diabetes for wound on the right side, right foot cellulitis    Interval Problem Update  2/20 patient is stable overnight, no significant chief complaint.  Patient otherwise denies fever, chills, nausea, vomiting, adb pain, SOB, CP, headache, constipation, diarrhea, cough, or sputum.  Patient tolerated antibiotics yesterday. Start patient on Cipro and linezolid and continue with the Bactrim.    2/21 patient is stable and no significant chief complaint overnight. Denies fever, chills, nausea vomiting diarrhea. Patient currently pending PT OT. Still on antibiotics for MRSA.  2/22 patient stable however surgical wound does not look screen. Recommended to stay in the hospital. LPS and infectious disease specialist. Continue oral antibiotics.    Consultants/Specialty  Infectious disease  Orthopedics  Limb preservation  Disposition  Inpatient        Review of Systems   Constitutional: Negative.  Negative for fever and weight loss.   HENT: Negative.  Negative for ear pain and tinnitus.    Eyes: Negative.  Negative for blurred vision and pain.   Respiratory: Negative.  Negative for sputum production and shortness of breath.    Cardiovascular: Negative.  Negative for chest pain and palpitations.   Gastrointestinal: Negative.  Negative for diarrhea, heartburn and vomiting.   Genitourinary: Negative.  Negative for hematuria and urgency.   Musculoskeletal: Positive for joint pain.        Swelling, erythema, pain, wants the right foot   Skin: Negative.    Neurological: Negative.    Endo/Heme/Allergies: Negative.    Psychiatric/Behavioral: Negative.    All other systems reviewed and are negative.     Physical Exam  Laboratory/Imaging   Hemodynamics  Temp  (24hrs), Av.7 °C (98.1 °F), Min:36.4 °C (97.6 °F), Max:37.1 °C (98.7 °F)   Temperature: 36.4 °C (97.6 °F)  Pulse  Av.6  Min: 50  Max: 99    Blood Pressure: 146/84      Respiratory      Respiration: 13, Pulse Oximetry: 94 %        RUL Breath Sounds: Diminished, RML Breath Sounds: Diminished, RLL Breath Sounds: Diminished, ELIAS Breath Sounds: Diminished, LLL Breath Sounds: Diminished    Fluids  No intake or output data in the 24 hours ending 18 1559    Nutrition  Orders Placed This Encounter   Procedures   • DIET ORDER     Standing Status:   Standing     Number of Occurrences:   1     Order Specific Question:   Diet:     Answer:   Diabetic [3]     Physical Exam   Constitutional: He is oriented to person, place, and time. He appears well-developed. No distress.   HENT:   Head: Normocephalic and atraumatic.   Eyes: EOM are normal. Pupils are equal, round, and reactive to light. Left eye exhibits no discharge.   Neck: Normal range of motion. Neck supple. No JVD present. No tracheal deviation present.   Cardiovascular: Normal rate, regular rhythm and normal heart sounds.    No murmur heard.  Pulmonary/Chest: Effort normal and breath sounds normal. He has no wheezes. He has no rales. He exhibits no tenderness.   Abdominal: Soft. Bowel sounds are normal. He exhibits no distension and no mass. There is no tenderness. There is no rebound.   Musculoskeletal: Normal range of motion. He exhibits no edema or deformity.   Patient is surgical wound is still not clean   Neurological: He is alert and oriented to person, place, and time.   Skin: Skin is warm and dry. He is not diaphoretic.   Psychiatric: He has a normal mood and affect. His behavior is normal.   Nursing note and vitals reviewed.      Recent Labs      18   0523   WBC  8.9   RBC  4.16*   HEMOGLOBIN  11.9*   HEMATOCRIT  36.4*   MCV  87.5   MCH  28.6   MCHC  32.7*   RDW  52.1*   PLATELETCT  206   MPV  10.4     Recent Labs      18   0523   SODIUM   137   POTASSIUM  3.8   CHLORIDE  106   CO2  25   GLUCOSE  111*   BUN  15   CREATININE  1.45*   CALCIUM  8.2*                      Assessment/Plan     * Diabetic foot infection (CMS-HCC)- (present on admission)   Assessment & Plan    Osteomyelitis and multiple right foot abscess , status post irrigation and debridement on February 19  -Staph aureus growing. +GPC  - Infectious disease consulted  -cont with cipro, linezolid,  Bactrim  - Tolerated antibiotics  - need HH and PT OT at home  -Patient's surgical wounds are clean, hold discharge for no         Type 2 diabetes mellitus (CMS-HCC)- (present on admission)   Assessment & Plan    Holding metformin  insulin sliding scale. Hypoglycemia protocol        HTN (hypertension)- (present on admission)   Assessment & Plan    Continue Norvasc and Prinivil   Monitor Blood pressure        Atrial fibrillation (CMS-HCC)- (present on admission)   Assessment & Plan    Continue digoxin and metoprolol. Heart rate is controlled  On admission xarelto was felt and started IV heparin for surgery  After surgery, started on subcu Lovenox  Follow up with surgery to see if they  plan any more procedure, and if not, restart Xarelto        HLD (hyperlipidemia)- (present on admission)   Assessment & Plan     continue home Pravachol .          Quality-Core Measures   Reviewed items::  Labs reviewed, Radiology images reviewed and Medications reviewed  Zabala catheter::  No Zabala  DVT prophylaxis pharmacological::  Enoxaparin (Lovenox)  DVT prophylaxis - mechanical:  SCDs  Ulcer Prophylaxis::  Not indicated  Antibiotics:  Treating active infection/contamination beyond 24 hours perioperative coverage   For complexity-based billing, please refer to the history, exam, and decison making above. In addition, I spent >35 minutes caring for the patient today. More than 50% of the time was spent counseling and coordinating care.    I have discussed with RN and BRANDON and LUNA and other consultants about  patient's plan.

## 2018-02-23 NOTE — PROGRESS NOTES
Patient seen and examined    Blood pressure 146/84, pulse 99, temperature 36.4 °C (97.6 °F), resp. rate 13, height 1.829 m (6'), weight (!) 133.3 kg (293 lb 14 oz), SpO2 94 %.    Recent Labs      02/21/18   0523   WBC  8.9   RBC  4.16*   HEMOGLOBIN  11.9*   HEMATOCRIT  36.4*   MCV  87.5   MCH  28.6   MCHC  32.7*   RDW  52.1*   PLATELETCT  206   MPV  10.4       No acute distress  Dressing clean dry and intact  Neurovascularly intact    POD#4    Plan:  DVT Prophylaxis- TEDS/SCDs  Weight Bearing Status-WBAT  PT/OT  Antibiotics: per ID  Case Coordination

## 2018-02-23 NOTE — PROGRESS NOTES
Infectious Disease Progress Note    Author: Jane Anderson M.D. Date & Time of service: 2018  2:45 PM    Chief Complaint:  FU right foot abscess    Interval History:   AF, WBc 9.9, had reaction to unasyn earlier today with rash, itching and tongue swelling, now on vancomycin, just returned from I&D, pain controlled, never had cephalosporins per pt   AF, no CBC, had rash, itching and tongue swelling with vanco, abx changed to linezolid which he is tolerating, also tolerating aztreonam   AF, WBC 8.9, pain controlled, tolerating cipro without issues, plan for surgical dressing removal today per pt   AF, no CBC, has pain, wound surgical site with edema and erythema   AF, Right , having nausea with abx but antiemetics helping, denies vomiting  Labs Reviewed, Medications Reviewed, Radiology Reviewed and Wound Reviewed.    Review of Systems:  Review of Systems   Constitutional: Negative for chills and fever.   Respiratory: Negative for shortness of breath.    Gastrointestinal: Positive for nausea. Negative for abdominal pain, diarrhea and vomiting.   Musculoskeletal: Positive for joint pain and myalgias.   Skin: Positive for itching and rash.       Hemodynamics:  Temp (24hrs), Av.7 °C (98.1 °F), Min:36.4 °C (97.6 °F), Max:37.1 °C (98.7 °F)  Temperature: 36.4 °C (97.6 °F)  Pulse  Av.6  Min: 50  Max: 99   Blood Pressure: 146/84       Physical Exam:  Physical Exam   Constitutional: He is oriented to person, place, and time. He appears well-developed.   HENT:   Head: Normocephalic and atraumatic.   Eyes: EOM are normal. Pupils are equal, round, and reactive to light.   Neck: Neck supple.   Cardiovascular: Normal rate.    IRR   Pulmonary/Chest: Effort normal and breath sounds normal.   Abdominal: Soft. There is no tenderness.   Musculoskeletal:   R foot surgical site with sutures in place. Scant drainage +edema and erythema   Neurological: He is alert and oriented to person,  place, and time.       Meds:    Current Facility-Administered Medications:   •  insulin regular **AND** [CANCELED] Accu-Chek Q6 if NPO **AND** NOTIFY MD and PharmD **AND** glucose 4 g **AND** dextrose 50%  •  ciprofloxacin  •  ondansetron  •  ondansetron  •  prochlorperazine  •  prochlorperazine  •  enoxaparin (LOVENOX) injection  •  linezolid  •  pravastatin  •  metoprolol  •  lisinopril  •  digoxin  •  aspirin  •  amLODIPine  •  senna-docusate **AND** polyethylene glycol/lytes **AND** magnesium hydroxide **AND** bisacodyl  •  NS  •  acetaminophen  •  Notify provider if pain remains uncontrolled **AND** Use the numeric rating scale (NRS-11) on regular floors and Critical-Care Pain Observation Tool (CPOT) on ICUs/Trauma to assess pain **AND** Pulse Ox (Oximetry) **AND** [DISCONTINUED] Pharmacy Consult Request **AND** If patient difficult to arouse and/or has respiratory depression, stop any opiates that are currently infusing and call a Rapid Response. **AND** oxyCODONE immediate-release **AND** oxyCODONE immediate-release **AND** morphine injection  •  famotidine  •  diphenhydrAMINE    Labs:  Recent Labs      02/21/18   0523   WBC  8.9   RBC  4.16*   HEMOGLOBIN  11.9*   HEMATOCRIT  36.4*   MCV  87.5   MCH  28.6   RDW  52.1*   PLATELETCT  206   MPV  10.4   NEUTSPOLYS  55.60   LYMPHOCYTES  24.20   MONOCYTES  7.70   EOSINOPHILS  11.90*   BASOPHILS  0.30     Recent Labs      02/21/18   0523   SODIUM  137   POTASSIUM  3.8   CHLORIDE  106   CO2  25   GLUCOSE  111*   BUN  15     Recent Labs      02/21/18   0523   CREATININE  1.45*       Imaging:  Dx-chest-for Picc Line Perform Procedure In: Patient's Room    Result Date: 1/20/2018 1/20/2018 11:02 PM HISTORY/REASON FOR EXAM:  PICC line placement. TECHNIQUE/EXAM DESCRIPTION AND NUMBER OF VIEWS: Single view of the chest. COMPARISON: 11/20/2017 FINDINGS: Cardiac mediastinal contour is unchanged. Lungs show hypoinflation. Pulmonary vessels are again mildly prominent. No  pleural fluid collection or pneumothorax. LEFT arm PICC line in place with tip at the mid SVC. No major bony abnormality is seen.     1.  Supportive tubing as described above. 2.  No pneumonia or pneumothorax. 3.  Pulmonary vasculature is again mildly prominent.    Dx-chest-limited (1 View)    Result Date: 2/1/2018 2/1/2018 1:00 PM HISTORY/REASON FOR EXAM:  Chest Pain Right chest pain TECHNIQUE/EXAM DESCRIPTION AND NUMBER OF VIEWS: Single portable view of the chest. COMPARISON: 1/26/2018 FINDINGS: No pulmonary infiltrates or consolidations are noted. No pleural effusion. No pneumothorax. Stable enlarged cardiopericardial silhouette.     Stable cardiomegaly No acute cardiopulmonary abnormalities are identified.    Dx-chest-limited (1 View)    Addendum Date: 1/26/2018    Addendum: Left PICC line is seen terminating overlying the expected location of the proximal superior vena cava    Result Date: 1/26/2018 1/26/2018 3:16 AM HISTORY/REASON FOR EXAM:  PICC line placement TECHNIQUE/EXAM DESCRIPTION:  Single AP view of the chest. COMPARISON: January 20, 2018 FINDINGS: Overlying cardiac leads are present. The heart is in the upper limits of normal for size. The mediastinal contour appears within normal limits.  The central pulmonary vasculature appears normal. The lungs appear well expanded bilaterally.  Bilateral lungs are clear. No significant pleural effusions are identified. The bony structures appear age-appropriate.     1.  No acute cardiopulmonary disease.    Dx-chest-portable (1 View)    Result Date: 2/8/2018 2/8/2018 2:57 PM HISTORY/REASON FOR EXAM:  Chest Pain Mid sternal chest pain. TECHNIQUE/EXAM DESCRIPTION AND NUMBER OF VIEWS: Single portable view of the chest. COMPARISON: 2/1/18 FINDINGS: No pulmonary infiltrates or consolidations are noted. No pleural effusion. No pneumothorax. Stable enlarged cardiopericardial silhouette.     1. Stable cardiomegaly No pulmonary infiltrates or consolidations are  noted.    Dx-foot-complete 3+ Right    Result Date: 2/17/2018 2/17/2018 11:04 PM HISTORY/REASON FOR EXAM:  Right foot infection, open wound TECHNIQUE/EXAM DESCRIPTION AND NUMBER OF VIEWS: 3 views of the RIGHT foot. COMPARISON:  Right foot x-ray 2/2/2018 FINDINGS: There has been first transmetatarsal amputation. The stump appears within normal limits. There is adjacent soft tissue swelling. 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 There are subluxations at multiple metatarsophalangeal joint. There is spurring at the insertion of achilles' tendon and origin of plantar fascia.     1.  Prior first transmetatarsal amputation 2.  Midfoot osteoarthritis 3.  calcaneal spurring    Dx-foot-complete 3+ Right    Result Date: 2/2/2018 2/2/2018 4:40 AM HISTORY/REASON FOR EXAM:  Right foot pain.. TECHNIQUE/EXAM DESCRIPTION AND NUMBER OF VIEWS:  3 views of the RIGHT foot. COMPARISON: 1/15/2018 FINDINGS: There is evidence of amputation through the proximal shaft of the first metatarsal, which has occurred since the prior images. There is generalized soft tissue swelling about the distal foot. No soft tissue gas however. No acute bony abnormality.     No acute bony abnormality.    Mr-foot-with & W/o Right    Result Date: 2/19/2018 2/18/2018 4:39 PM HISTORY/REASON FOR EXAM:  Open wound. Rt Foot pain, Open Wound Mid Foot TECHNIQUE/EXAM DESCRIPTION: MRI of the RIGHT foot with and without contrast. The study was performed on a Nighat 3.0 Taya MRI scanner. T1 axial, T1 sagittal, T1 coronal, STIR sagittal, T2 fast spin-echo fat-suppressed coronal, sagittal inversion recovery, and T1 postcontrast coronal images were obtained. 20 mL Omniscan contrast was administered intravenously. COMPARISON: 1/16/2018. FINDINGS: Interval further amputation of the first ray with small residual first metatarsal stump. There is a deep soft tissue ulcer at the amputation bed with extensive phlegmonous change. There are susceptibility artifact foci within  the surgical bed which could relate to gas. There is tenosynovitis of the distal extensor hallucis longus underneath the ulcer. There is bone marrow replacement in the first metatarsal stump, consistent with osteomyelitis.     MR evidence of osteomyelitis within the first metatarsal stump. Deep soft tissue ulcer at the amputation bed with extensive phlegmonous change and possible gas. No discrete fluid collection seen. There is tenosynovitis of the distal extensor hallucis longus underneath the ulcer.     Echocardiogram-comp W/ Cont    Result Date: 2018  Transthoracic Echo Report Echocardiography Laboratory CONCLUSIONS Technically difficult but adequate study for interpretation. Contrast was used to enhance visualization of the endocardial border. Normal left ventricular chamber size. Normal left ventricular systolic function. Left ventricular ejection fraction is visually estimated to be 60%. Mild concentric left ventricular hypertrophy. Trace mitral regurgitation. Normal left atrial size. Structurally normal aortic valve without significant stenosis or regurgitation. Mild tricuspid regurgitation. Estimated right ventricular systolic pressure  is 35 mmHg. Normal inferior vena cava size and inspiratory collapse. Normal right ventricular size and systolic function. EMELY FLORES Exam Date:         2018                    08:43 Exam Location:     Inpatient Priority:          Routine Ordering Physician:        FABIAN RAMIRES Referring Physician: Sonographer:               Sherry Funes RDCS Age:    52     Gender:    M MRN:    [id number] :    1965 BSA:    2.51   Ht (in):    72     Wt (lb):    295 Exam Type:     Complete, Contrast Indications:     Paroxysmal atrial fibrillation ICD Codes:       I480 CPT Codes:       24424,  BP:   145    /   101    HR:   110 Technical Quality:       Technically difficult study -                          adequate information is obtained MEASUREMENTS  (Male /  Female) Normal Values 2D ECHO LV Diastolic Diameter PLAX        4.4 cm                4.2 - 5.9 / 3.9 - 5.3 cm LV Systolic Diameter PLAX         2.5 cm                2.1 - 4.0 cm IVS Diastolic Thickness           1.5 cm                LVPW Diastolic Thickness          1.1 cm                RV Diameter 4C                    3.2 cm                2.5 - 2.9 cm LVOT Diameter                     2.1 cm                RA Diameter                       3.6 cm                Estimated LV Ejection Fraction    60 %                  LV Ejection Fraction MOD BP       54.7 %                >= 55  % LV Ejection Fraction MOD 4C       64.7 %                LV Ejection Fraction MOD 2C       55.5 %                LA Volume Index                   28.8 cm³/m²           16 - 28 cm³/m² IVC Diameter                      1.5 cm                M-MODE Aortic Root Diameter MM           3.9 cm                DOPPLER AV Peak Velocity                  1.3 m/s               AV Peak Gradient                  7 mmHg                AV Mean Gradient                  3.5 mmHg              LVOT Peak Velocity                1.2 m/s               AV Area Cont Eq vti               3 cm²                 TR Peak Velocity                  239 cm/s              PV Peak Velocity                  0.91 m/s              PV Peak Gradient                  3.3 mmHg              RVOT Peak Velocity                0.69 m/s              * Indicates values subject to auto-interpretation LV EF:  60    % FINDINGS Left Ventricle 3 mL of contrast was administered. Contrast was used to enhance visualization of the endocardial border. Existing IV was used. Normal left ventricular chamber size. Mild concentric left ventricular hypertrophy. Normal left ventricular systolic function. Left ventricular ejection fraction is visually estimated to be 60%. Diastolic function is difficult to assess with atrial fibrillation. Right Ventricle Normal right ventricular size and systolic  function. Right Atrium Normal right atrial size. Normal inferior vena cava size and inspiratory collapse. Left Atrium Normal left atrial size. Mitral Valve Thickened mitral valve leaflets. Trace mitral regurgitation. Aortic Valve Structurally normal aortic valve without significant stenosis or regurgitation. Tricuspid Valve Mild tricuspid regurgitation. Estimated right ventricular systolic pressure  is 35 mmHg. Pulmonic Valve Structurally normal pulmonic valve without significant stenosis or regurgitation. Pericardium Normal pericardium without effusion. Aorta Aortic root dilatation measuring 3.9 cm. Rocio Ross M.D. (Electronically Signed) Final Date:     2018                 14:23    Le Venous Duplex (specify In Comments Left, Right Or Bilateral)    Result Date: 2018   Vascular Laboratory  CONCLUSIONS  Normal bilateral superficial and deep venous examination of the right leg.  EMELY FLORES  Exam Date:     2018 04:36  Room #:     Inpatient  Priority:     Routine  Ht (in):             Wt (lb):  Ordering Physician:        SHIRLEY WILLIAMSON  Referring Physician:       [id number] Sultana  Sonographer:               YOKASTA troncoso rvt  Study Type:  Technical Quality:         Adequate  Age:    52    Gender:     M  MRN:    [id number]  :    1965      BSA:  Indications:     RT LEG SWELLING  CPT Codes:  ICD Codes:  History:         Swelling of the rt limb  Limitations:     body habitus in calf  PROCEDURES:  rt lower ext venous doppler  FINDINGS:  Complete color filling and compressibility with normal venous flow dynamics  including spontaneous flow, response to augmentation maneuvers, and  respiratory phasicity.  The peroneal and posterior tibial veins are difficult to assess for  compressibility, but flow response to augmentation is demonstrated.  GSV DST THIGH Densely echogenic material is within  the vein that is  consistent with chronic venous thrombosis. FLOW IS SEEN  ___vein is partially  compressible.  AREA OF PAIN - edema obscures the visualization of the  lat/post calf  Germain WOODY Herber  (Electronically Signed)  Final Date:      2018                   07:11    Nm-cardiac Stress Test    Addendum Date: 2018    Baseline ECG:atrial fibrillation Stress ECG: No ischemic T wave changes with peak stress Impression: Normal stress ECG, see nuclear images     Result Date: 2018   Myocardial Perfusion  Report  NUCLEAR IMAGING INTERPRETATION  No evidence of significant jeopardized viable myocardium or prior myocardial  infarction.  Normal left ventricular size, ejection fraction, and wall motion.  EMELY FLORES  MRN:    [id number]         Gender:    M  Exam Date: 2018 06:35  Exam Location:      Inpatient  Ordering Phys:     ALETHEA MASSEY  NucMed Tech:       RT Rasheeda  Age:    52    :    1965        Ht (in):     72  Wt (lb):     298    BMI:    40.37       Radiologist  Risk Factors:             Family history of coronary disease, Diabetes,                            Hypertension  Indications:              Shortness of breath, Chest pain, unspecified  ICD Codes:                  R079  Cardiac History:          None reported  Cardiac Meds:  Meds Past 24 hrs:  Pretest Chest Pain:  STRESS TEST      Pharmacologi                   uche Agrawal   Lexiscan       Dose: 0.4 mg  ol:  Post-Injection Exercise:  Resting HR (bpm):      95  Peak HR (bpm):         109  Resting BP (mmHg):       122    /   77  Peak BP (mmHg):       131   /   92  MaxPHR:     166     Target HR (bpm):       141  % MaxPHR:     66  Double Product:       15974  BP Response:  Stress Termination:       Completed Protocol  Stress Symptoms:  Arrhythmia: None Chest Pain: None Dyspnea Flushing, Headache, Somach Cramps,  Leg Fatigue  ECG  Resting ECG:  Stress ECG:  IMAGE PROTOCOL      Rest/Stress 1                      Day          RadiopharmaceuticalDose (mCi)   Imaging  Date      Imaging   Time         Inj to Img Time (min)  Rest:   Tc-99m             7.4          29-Jan-2018        13:36          Tetrofosmin  Stress: Tc-99m             25.0         29-Jan-2018        14:27          Tetrofosmin  Rest:  Administration Site:       Left forearm  Administered by:      RT Rasheeda  Stress:  Administration Site:       Left forearm  Administered by:      Graeme Joaquin RN  % Percent HR Achieved:  SPECT RESULTS  Technical Quality:       Good  Raw Data Analysis:  Summed Stress Score:    2  Summed Rest Score:    2  Summed Difference Score:        2  PERFUSION:  Normal myocardial perfusion with no ischemia.  FUNCTIONAL RESULTS (calculated via Gated SPECT)  Stress Image LV EF:        64     %  Upper Normal Limit  Stress EDV:      92     ml   EDVI:    36      ml/m²  Stress ESV:      33     ml   ESVI:    13      ml/m²  TID:    0.96   TID - 1.19      TID (ed) - 1.23  LV Function:  Normal left ventricular wall motion.  LV ejection fraction = 64%.  Shmuel Rai  (Electronically Signed)  Final Date:      29 January 2018                   15:38    Ct-cta Chest Pulmonary Artery W/ Recons    Result Date: 2/2/2018 2/2/2018 4:04 AM HISTORY/REASON FOR EXAM:  Pain TECHNIQUE/EXAM DESCRIPTION: CT angiogram scan for pulmonary embolism with contrast, with reconstructions. 1.25 mm helical sections were obtained from the lung apices through the lung bases following the rapid bolus administration of 90 mL of Omnipaque 350 nonionic contrast. Thin-section overlapping reconstruction interval was utilized. Coronal reconstructions were generated from the axial data. MIP post processing was performed and utilized for the interpretation. Low dose optimization technique was utilized for this CT exam including automated exposure control and adjustment of the mA and/or kV according to patient size. COMPARISON: None FINDINGS: Pulmonary Embolism: No. Lungs: No suspicious nodules or air space process. Pleura: No pleural  "effusion. Nodes: No enlarged lymph nodes. Additional findings: There are multiple focal gas lucencies within the gallbladder, consistent with known opaque gallstones. Localized increased opacity is seen in the retroareolar regions of both breasts, consistent with gynecomastia..     1.  No evidence of pulmonary emboli or other acute intrathoracic abnormality. 2.  Cholelithiasis. 3.  Gynecomastia.       Micro:  Results     Procedure Component Value Units Date/Time    BLOOD CULTURE [[id number]] Collected:  02/17/18 2245    Order Status:  Completed Specimen:  Blood from Peripheral Updated:  02/23/18 0100     Significant Indicator NEG     Source BLD     Site PERIPHERAL     Blood Culture No growth after 5 days of incubation.    Narrative:       Per Hospital Policy: Only change Specimen Src: to \"Line\" if  specified by physician order.    BLOOD CULTURE [[id number]] Collected:  02/17/18 2316    Order Status:  Completed Specimen:  Blood from Peripheral Updated:  02/23/18 0100     Significant Indicator NEG     Source BLD     Site PERIPHERAL     Blood Culture No growth after 5 days of incubation.    Narrative:       Per Hospital Policy: Only change Specimen Src: to \"Line\" if  specified by physician order.    AFB CULTURE [[id number]] Collected:  02/19/18 1223    Order Status:  Completed Specimen:  Tissue Updated:  02/22/18 1452     Significant Indicator NEG     Source TISS     Site Right Foot     AFB Culture Culture in progress.     AFB Smear Results No acid fast bacilli seen.    CULTURE TISSUE W/ GRM STAIN [[id number]]  (Abnormal) Collected:  02/19/18 1223    Order Status:  Completed Specimen:  Tissue Updated:  02/22/18 1452     Gram Stain Result Few Gram positive cocci. (A)     Significant Indicator POS (POS)     Source TISS     Site Right Foot     Tissue Culture -- (A)     Tissue Culture -- (A)     Methicillin Resistant Staphylococcus aureus  Moderate growth  See previous culture for sensitivity report.      ANAEROBIC CULTURE " [[id number]] Collected:  02/19/18 1223    Order Status:  Completed Specimen:  Tissue Updated:  02/22/18 1452     Significant Indicator NEG     Source TISS     Site Right Foot     Anaerobic Culture, Culture Res No Anaerobes isolated.    FUNGAL CULTURE [[id number]] Collected:  02/19/18 1223    Order Status:  Completed Specimen:  Tissue Updated:  02/22/18 1452     Significant Indicator NEG     Source TISS     Site Right Foot     Fungal Culture Culture in progress.    GRAM STAIN [[id number]] Collected:  02/19/18 1223    Order Status:  Completed Specimen:  Tissue Updated:  02/20/18 2107     Significant Indicator .     Source TISS     Site Right Foot     Gram Stain Result Few Gram positive cocci.    ACID FAST STAIN [[id number]] Collected:  02/19/18 1223    Order Status:  Completed Specimen:  Tissue Updated:  02/20/18 2107     Significant Indicator NEG     Source TISS     Site Right Foot     AFB Smear Results No acid fast bacilli seen.    CULTURE WOUND W/ GRAM STAIN [[id number]]  (Abnormal)  (Susceptibility) Collected:  02/17/18 2236    Order Status:  Completed Specimen:  Wound from Right Foot Updated:  02/20/18 0925     Gram Stain Result --     Few WBCs.  Moderate Gram positive cocci.       Significant Indicator POS (POS)     Source WND     Site RIGHT FOOT     Culture Result Wound -- (A)     Culture Result Wound -- (A)     Methicillin Resistant Staphylococcus aureus  Heavy growth  This isolate is presumed to be clindamycin resistant based on  detection of inducible resistance.  Clindamycin may still  be effective in some patients.       Culture Result Wound -- (A)     Proteus mirabilis  Light growth      Culture & Susceptibility     METHICILLIN RESISTANT STAPHYLOCOCCUS AUREUS     Antibiotic Sensitivity Microscan Unit Status    Ampicillin/sulbactam Resistant 16/8 mcg/mL Final    Clindamycin Resistant 1 mcg/mL Final    Daptomycin Sensitive 1 mcg/mL Final    Erythromycin Resistant >4 mcg/mL Final    Moxifloxacin Resistant >4  mcg/mL Final    Oxacillin Resistant >2 mcg/mL Final    Penicillin Resistant >8 mcg/mL Final    Tetracycline Sensitive <=4 mcg/mL Final    Trimeth/Sulfa Sensitive <=0.5/9.5 mcg/mL Final    Vancomycin Sensitive 2 mcg/mL Final              PROTEUS MIRABILIS     Antibiotic Sensitivity Microscan Unit Status    Ampicillin Sensitive <=8 mcg/mL Final    Cefepime Sensitive <=8 mcg/mL Final    Cefotaxime Sensitive <=2 mcg/mL Final    Cefotetan Sensitive <=16 mcg/mL Final    Ceftazidime Sensitive <=1 mcg/mL Final    Ceftriaxone Sensitive <=8 mcg/mL Final    Cefuroxime Sensitive <=4 mcg/mL Final    Ciprofloxacin Sensitive <=1 mcg/mL Final    Ertapenem Sensitive <=1 mcg/mL Final    Gentamicin Sensitive <=4 mcg/mL Final    Pip/Tazobactam Sensitive <=16 mcg/mL Final    Tobramycin Sensitive <=4 mcg/mL Final    Trimeth/Sulfa Sensitive <=2/38 mcg/mL Final                       GRAM STAIN [[id number]] Collected:  02/17/18 2234    Order Status:  Completed Specimen:  Wound Updated:  02/18/18 1805     Significant Indicator .     Source WND     Site RIGHT FOOT     Gram Stain Result --     Few WBCs.  Moderate Gram positive cocci.            Assessment:  Active Hospital Problems    Diagnosis   • *Diabetic foot infection (CMS-[facility]) [E11.69, L08.9]   • Type 2 diabetes mellitus (CMS-[facility]) [E11.9]   • HLD (hyperlipidemia) [E78.5]   • HTN (hypertension) [I10]   • Atrial fibrillation (CMS-[facility]) [I48.91]       Plan:  Right foot abscess, not improving  Afebrile  Leukocytosis resolved  S/p I&D on 2/19. Multiple loculations were noted intraop  OR gram stain +GPC, cx MRSA  Wound cx - MRSA (vanco ISAMAR 2) and proteus  Prior cx +MRSA, efaecalis  Continue cipro  Continue linezolid due to reaction from vanco and dapto  Wound care   Anticipate 2 weeks of abx from 2/19  Stop date 03/05/18 - extend if clinically needed  Surgical site with signs of infection    DM2  hgA1c 6.8  Maintain BS less than 150 to control infection    Hold discharge until surgical site  clinically improved

## 2018-02-24 LAB
GLUCOSE BLD-MCNC: 122 MG/DL (ref 65–99)
GLUCOSE BLD-MCNC: 134 MG/DL (ref 65–99)
GLUCOSE BLD-MCNC: 149 MG/DL (ref 65–99)
GLUCOSE BLD-MCNC: 166 MG/DL (ref 65–99)

## 2018-02-24 PROCEDURE — A9270 NON-COVERED ITEM OR SERVICE: HCPCS | Performed by: INTERNAL MEDICINE

## 2018-02-24 PROCEDURE — A9270 NON-COVERED ITEM OR SERVICE: HCPCS | Performed by: HOSPITALIST

## 2018-02-24 PROCEDURE — 770021 HCHG ROOM/CARE - ISO PRIVATE

## 2018-02-24 PROCEDURE — 99232 SBSQ HOSP IP/OBS MODERATE 35: CPT | Performed by: INTERNAL MEDICINE

## 2018-02-24 PROCEDURE — 700102 HCHG RX REV CODE 250 W/ 637 OVERRIDE(OP): Performed by: INTERNAL MEDICINE

## 2018-02-24 PROCEDURE — 82962 GLUCOSE BLOOD TEST: CPT | Mod: 91

## 2018-02-24 PROCEDURE — 700102 HCHG RX REV CODE 250 W/ 637 OVERRIDE(OP): Performed by: HOSPITALIST

## 2018-02-24 PROCEDURE — 700111 HCHG RX REV CODE 636 W/ 250 OVERRIDE (IP): Performed by: INTERNAL MEDICINE

## 2018-02-24 PROCEDURE — 700111 HCHG RX REV CODE 636 W/ 250 OVERRIDE (IP): Performed by: PHYSICIAN ASSISTANT

## 2018-02-24 RX ORDER — AMLODIPINE BESYLATE 10 MG/1
10 TABLET ORAL ONCE
Status: COMPLETED | OUTPATIENT
Start: 2018-02-24 | End: 2018-02-24

## 2018-02-24 RX ORDER — AMLODIPINE BESYLATE 10 MG/1
10 TABLET ORAL
Status: DISCONTINUED | OUTPATIENT
Start: 2018-02-25 | End: 2018-02-26 | Stop reason: HOSPADM

## 2018-02-24 RX ADMIN — DIGOXIN 250 MCG: 250 TABLET ORAL at 09:48

## 2018-02-24 RX ADMIN — ENOXAPARIN SODIUM 40 MG: 100 INJECTION SUBCUTANEOUS at 09:47

## 2018-02-24 RX ADMIN — LISINOPRIL 40 MG: 20 TABLET ORAL at 09:48

## 2018-02-24 RX ADMIN — CIPROFLOXACIN HYDROCHLORIDE 500 MG: 500 TABLET, FILM COATED ORAL at 18:22

## 2018-02-24 RX ADMIN — PROCHLORPERAZINE EDISYLATE 5 MG: 5 INJECTION INTRAMUSCULAR; INTRAVENOUS at 15:04

## 2018-02-24 RX ADMIN — ENOXAPARIN SODIUM 40 MG: 100 INJECTION SUBCUTANEOUS at 20:18

## 2018-02-24 RX ADMIN — FAMOTIDINE 20 MG: 20 TABLET, FILM COATED ORAL at 09:47

## 2018-02-24 RX ADMIN — LINEZOLID 600 MG: 600 TABLET, FILM COATED ORAL at 20:18

## 2018-02-24 RX ADMIN — ASPIRIN 81 MG: 81 TABLET, COATED ORAL at 09:48

## 2018-02-24 RX ADMIN — OXYCODONE HYDROCHLORIDE 5 MG: 5 TABLET ORAL at 10:18

## 2018-02-24 RX ADMIN — LINEZOLID 600 MG: 600 TABLET, FILM COATED ORAL at 09:48

## 2018-02-24 RX ADMIN — AMLODIPINE BESYLATE 10 MG: 10 TABLET ORAL at 10:18

## 2018-02-24 RX ADMIN — ONDANSETRON 4 MG: 4 TABLET, ORALLY DISINTEGRATING ORAL at 09:48

## 2018-02-24 RX ADMIN — CIPROFLOXACIN HYDROCHLORIDE 500 MG: 500 TABLET, FILM COATED ORAL at 09:47

## 2018-02-24 RX ADMIN — PRAVASTATIN SODIUM 20 MG: 10 TABLET ORAL at 20:18

## 2018-02-24 RX ADMIN — FAMOTIDINE 20 MG: 20 TABLET, FILM COATED ORAL at 20:18

## 2018-02-24 RX ADMIN — METOPROLOL TARTRATE 75 MG: 25 TABLET, FILM COATED ORAL at 20:18

## 2018-02-24 RX ADMIN — METOPROLOL TARTRATE 75 MG: 25 TABLET, FILM COATED ORAL at 09:48

## 2018-02-24 ASSESSMENT — ENCOUNTER SYMPTOMS
GASTROINTESTINAL NEGATIVE: 1
NAUSEA: 0
EYES NEGATIVE: 1
CHILLS: 0
SHORTNESS OF BREATH: 0
MYALGIAS: 1
EYE PAIN: 0
NEUROLOGICAL NEGATIVE: 1
SPUTUM PRODUCTION: 0
VOMITING: 0
PSYCHIATRIC NEGATIVE: 1
EYE DISCHARGE: 0
WEIGHT LOSS: 0
DIARRHEA: 0
FEVER: 0
BLURRED VISION: 0
PALPITATIONS: 0
ABDOMINAL PAIN: 0
CONSTITUTIONAL NEGATIVE: 1
HEARTBURN: 0
CLAUDICATION: 1
RESPIRATORY NEGATIVE: 1

## 2018-02-24 ASSESSMENT — PAIN SCALES - GENERAL
PAINLEVEL_OUTOF10: 0
PAINLEVEL_OUTOF10: 0
PAINLEVEL_OUTOF10: 4

## 2018-02-24 ASSESSMENT — LIFESTYLE VARIABLES: DO YOU DRINK ALCOHOL: NO

## 2018-02-24 NOTE — PROGRESS NOTES
Infectious Disease Progress Note    Author: Jane Anderson M.D. Date & Time of service: 2018  12:23 PM    Chief Complaint:  FU right foot abscess    Interval History:   AF, WBc 9.9, had reaction to unasyn earlier today with rash, itching and tongue swelling, now on vancomycin, just returned from I&D, pain controlled, never had cephalosporins per pt   AF, no CBC, had rash, itching and tongue swelling with vanco, abx changed to linezolid which he is tolerating, also tolerating aztreonam   AF, WBC 8.9, pain controlled, tolerating cipro without issues, plan for surgical dressing removal today per pt   AF, no CBC, has pain, wound surgical site with edema and erythema   AF, Right , having nausea with abx but antiemetics helping, denies vomiting   AF, sharp pain in his R foot overnight, still with edema and erythema  Labs Reviewed, Medications Reviewed, Radiology Reviewed and Wound Reviewed.    Review of Systems:  Review of Systems   Constitutional: Negative for chills and fever.   Respiratory: Negative for shortness of breath.    Gastrointestinal: Negative for abdominal pain, diarrhea, nausea and vomiting.   Musculoskeletal: Positive for joint pain and myalgias.   Skin: Negative for itching and rash.       Hemodynamics:  Temp (24hrs), Av.7 °C (98 °F), Min:36.1 °C (97 °F), Max:37.5 °C (99.5 °F)  Temperature: 36.5 °C (97.7 °F)  Pulse  Av.1  Min: 50  Max: 99   Blood Pressure: (!) 173/87       Physical Exam:  Physical Exam   Constitutional: He is oriented to person, place, and time. He appears well-developed.   HENT:   Head: Normocephalic and atraumatic.   Eyes: EOM are normal. Pupils are equal, round, and reactive to light.   Neck: Neck supple.   Cardiovascular: Normal rate.    IRR   Pulmonary/Chest: Effort normal and breath sounds normal.   Abdominal: Soft. There is no tenderness.   Musculoskeletal:   R foot surgical site with sutures in place. Scant drainage +edema and  erythema   Neurological: He is alert and oriented to person, place, and time.       Meds:    Current Facility-Administered Medications:   •  [START ON 2/25/2018] amLODIPine  •  labetalol  •  insulin regular **AND** [CANCELED] Accu-Chek Q6 if NPO **AND** NOTIFY MD and PharmD **AND** glucose 4 g **AND** dextrose 50%  •  ciprofloxacin  •  ondansetron  •  ondansetron  •  prochlorperazine  •  prochlorperazine  •  enoxaparin (LOVENOX) injection  •  linezolid  •  pravastatin  •  metoprolol  •  lisinopril  •  digoxin  •  aspirin  •  senna-docusate **AND** polyethylene glycol/lytes **AND** magnesium hydroxide **AND** bisacodyl  •  NS  •  acetaminophen  •  Notify provider if pain remains uncontrolled **AND** Use the numeric rating scale (NRS-11) on regular floors and Critical-Care Pain Observation Tool (CPOT) on ICUs/Trauma to assess pain **AND** Pulse Ox (Oximetry) **AND** [DISCONTINUED] Pharmacy Consult Request **AND** If patient difficult to arouse and/or has respiratory depression, stop any opiates that are currently infusing and call a Rapid Response. **AND** oxyCODONE immediate-release **AND** oxyCODONE immediate-release **AND** morphine injection  •  famotidine  •  diphenhydrAMINE    Labs:  No results for input(s): WBC, RBC, HEMOGLOBIN, HEMATOCRIT, MCV, MCH, RDW, PLATELETCT, MPV, NEUTSPOLYS, LYMPHOCYTES, MONOCYTES, EOSINOPHILS, BASOPHILS, RBCMORPHOLO in the last 72 hours.  No results for input(s): SODIUM, POTASSIUM, CHLORIDE, CO2, GLUCOSE, BUN, CPKTOTAL in the last 72 hours.  No results for input(s): ALBUMIN, TBILIRUBIN, ALKPHOSPHAT, TOTPROTEIN, ALTSGPT, ASTSGOT, CREATININE in the last 72 hours.    Imaging:  Dx-chest-for Picc Line Perform Procedure In: Patient's Room    Result Date: 1/20/2018 1/20/2018 11:02 PM HISTORY/REASON FOR EXAM:  PICC line placement. TECHNIQUE/EXAM DESCRIPTION AND NUMBER OF VIEWS: Single view of the chest. COMPARISON: 11/20/2017 FINDINGS: Cardiac mediastinal contour is unchanged. Lungs show  hypoinflation. Pulmonary vessels are again mildly prominent. No pleural fluid collection or pneumothorax. LEFT arm PICC line in place with tip at the mid SVC. No major bony abnormality is seen.     1.  Supportive tubing as described above. 2.  No pneumonia or pneumothorax. 3.  Pulmonary vasculature is again mildly prominent.    Dx-chest-limited (1 View)    Result Date: 2/1/2018 2/1/2018 1:00 PM HISTORY/REASON FOR EXAM:  Chest Pain Right chest pain TECHNIQUE/EXAM DESCRIPTION AND NUMBER OF VIEWS: Single portable view of the chest. COMPARISON: 1/26/2018 FINDINGS: No pulmonary infiltrates or consolidations are noted. No pleural effusion. No pneumothorax. Stable enlarged cardiopericardial silhouette.     Stable cardiomegaly No acute cardiopulmonary abnormalities are identified.    Dx-chest-limited (1 View)    Addendum Date: 1/26/2018    Addendum: Left PICC line is seen terminating overlying the expected location of the proximal superior vena cava    Result Date: 1/26/2018 1/26/2018 3:16 AM HISTORY/REASON FOR EXAM:  PICC line placement TECHNIQUE/EXAM DESCRIPTION:  Single AP view of the chest. COMPARISON: January 20, 2018 FINDINGS: Overlying cardiac leads are present. The heart is in the upper limits of normal for size. The mediastinal contour appears within normal limits.  The central pulmonary vasculature appears normal. The lungs appear well expanded bilaterally.  Bilateral lungs are clear. No significant pleural effusions are identified. The bony structures appear age-appropriate.     1.  No acute cardiopulmonary disease.    Dx-chest-portable (1 View)    Result Date: 2/8/2018 2/8/2018 2:57 PM HISTORY/REASON FOR EXAM:  Chest Pain Mid sternal chest pain. TECHNIQUE/EXAM DESCRIPTION AND NUMBER OF VIEWS: Single portable view of the chest. COMPARISON: 2/1/18 FINDINGS: No pulmonary infiltrates or consolidations are noted. No pleural effusion. No pneumothorax. Stable enlarged cardiopericardial silhouette.     1. Stable  cardiomegaly No pulmonary infiltrates or consolidations are noted.    Dx-foot-complete 3+ Right    Result Date: 2/17/2018 2/17/2018 11:04 PM HISTORY/REASON FOR EXAM:  Right foot infection, open wound TECHNIQUE/EXAM DESCRIPTION AND NUMBER OF VIEWS: 3 views of the RIGHT foot. COMPARISON:  Right foot x-ray 2/2/2018 FINDINGS: There has been first transmetatarsal amputation. The stump appears within normal limits. There is adjacent soft tissue swelling. 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 There are subluxations at multiple metatarsophalangeal joint. There is spurring at the insertion of achilles' tendon and origin of plantar fascia.     1.  Prior first transmetatarsal amputation 2.  Midfoot osteoarthritis 3.  calcaneal spurring    Dx-foot-complete 3+ Right    Result Date: 2/2/2018 2/2/2018 4:40 AM HISTORY/REASON FOR EXAM:  Right foot pain.. TECHNIQUE/EXAM DESCRIPTION AND NUMBER OF VIEWS:  3 views of the RIGHT foot. COMPARISON: 1/15/2018 FINDINGS: There is evidence of amputation through the proximal shaft of the first metatarsal, which has occurred since the prior images. There is generalized soft tissue swelling about the distal foot. No soft tissue gas however. No acute bony abnormality.     No acute bony abnormality.    Mr-foot-with & W/o Right    Result Date: 2/19/2018 2/18/2018 4:39 PM HISTORY/REASON FOR EXAM:  Open wound. Rt Foot pain, Open Wound Mid Foot TECHNIQUE/EXAM DESCRIPTION: MRI of the RIGHT foot with and without contrast. The study was performed on a Nighat 3.0 Taya MRI scanner. T1 axial, T1 sagittal, T1 coronal, STIR sagittal, T2 fast spin-echo fat-suppressed coronal, sagittal inversion recovery, and T1 postcontrast coronal images were obtained. 20 mL Omniscan contrast was administered intravenously. COMPARISON: 1/16/2018. FINDINGS: Interval further amputation of the first ray with small residual first metatarsal stump. There is a deep soft tissue ulcer at the amputation bed with extensive  phlegmonous change. There are susceptibility artifact foci within the surgical bed which could relate to gas. There is tenosynovitis of the distal extensor hallucis longus underneath the ulcer. There is bone marrow replacement in the first metatarsal stump, consistent with osteomyelitis.     MR evidence of osteomyelitis within the first metatarsal stump. Deep soft tissue ulcer at the amputation bed with extensive phlegmonous change and possible gas. No discrete fluid collection seen. There is tenosynovitis of the distal extensor hallucis longus underneath the ulcer.     Echocardiogram-comp W/ Cont    Result Date: 2018  Transthoracic Echo Report Echocardiography Laboratory CONCLUSIONS Technically difficult but adequate study for interpretation. Contrast was used to enhance visualization of the endocardial border. Normal left ventricular chamber size. Normal left ventricular systolic function. Left ventricular ejection fraction is visually estimated to be 60%. Mild concentric left ventricular hypertrophy. Trace mitral regurgitation. Normal left atrial size. Structurally normal aortic valve without significant stenosis or regurgitation. Mild tricuspid regurgitation. Estimated right ventricular systolic pressure  is 35 mmHg. Normal inferior vena cava size and inspiratory collapse. Normal right ventricular size and systolic function. EMELY FLORES Exam Date:         2018                    08:43 Exam Location:     Inpatient Priority:          Routine Ordering Physician:        FABIAN RAMIRES Referring Physician: Sonographer:               Sherry Funes RDCS Age:    52     Gender:    M MRN:    [id number] :    1965 BSA:    2.51   Ht (in):    72     Wt (lb):    295 Exam Type:     Complete, Contrast Indications:     Paroxysmal atrial fibrillation ICD Codes:       I480 CPT Codes:       37926,  BP:   145    /   101    HR:   110 Technical Quality:       Technically difficult study -                           adequate information is obtained MEASUREMENTS  (Male / Female) Normal Values 2D ECHO LV Diastolic Diameter PLAX        4.4 cm                4.2 - 5.9 / 3.9 - 5.3 cm LV Systolic Diameter PLAX         2.5 cm                2.1 - 4.0 cm IVS Diastolic Thickness           1.5 cm                LVPW Diastolic Thickness          1.1 cm                RV Diameter 4C                    3.2 cm                2.5 - 2.9 cm LVOT Diameter                     2.1 cm                RA Diameter                       3.6 cm                Estimated LV Ejection Fraction    60 %                  LV Ejection Fraction MOD BP       54.7 %                >= 55  % LV Ejection Fraction MOD 4C       64.7 %                LV Ejection Fraction MOD 2C       55.5 %                LA Volume Index                   28.8 cm³/m²           16 - 28 cm³/m² IVC Diameter                      1.5 cm                M-MODE Aortic Root Diameter MM           3.9 cm                DOPPLER AV Peak Velocity                  1.3 m/s               AV Peak Gradient                  7 mmHg                AV Mean Gradient                  3.5 mmHg              LVOT Peak Velocity                1.2 m/s               AV Area Cont Eq vti               3 cm²                 TR Peak Velocity                  239 cm/s              PV Peak Velocity                  0.91 m/s              PV Peak Gradient                  3.3 mmHg              RVOT Peak Velocity                0.69 m/s              * Indicates values subject to auto-interpretation LV EF:  60    % FINDINGS Left Ventricle 3 mL of contrast was administered. Contrast was used to enhance visualization of the endocardial border. Existing IV was used. Normal left ventricular chamber size. Mild concentric left ventricular hypertrophy. Normal left ventricular systolic function. Left ventricular ejection fraction is visually estimated to be 60%. Diastolic function is difficult to assess with atrial fibrillation.  Right Ventricle Normal right ventricular size and systolic function. Right Atrium Normal right atrial size. Normal inferior vena cava size and inspiratory collapse. Left Atrium Normal left atrial size. Mitral Valve Thickened mitral valve leaflets. Trace mitral regurgitation. Aortic Valve Structurally normal aortic valve without significant stenosis or regurgitation. Tricuspid Valve Mild tricuspid regurgitation. Estimated right ventricular systolic pressure  is 35 mmHg. Pulmonic Valve Structurally normal pulmonic valve without significant stenosis or regurgitation. Pericardium Normal pericardium without effusion. Aorta Aortic root dilatation measuring 3.9 cm. Rocio Ross M.D. (Electronically Signed) Final Date:     2018                 14:23    Le Venous Duplex (specify In Comments Left, Right Or Bilateral)    Result Date: 2018   Vascular Laboratory  CONCLUSIONS  Normal bilateral superficial and deep venous examination of the right leg.  EMELY FLORES  Exam Date:     2018 04:36  Room #:     Inpatient  Priority:     Routine  Ht (in):             Wt (lb):  Ordering Physician:        SHIRLEY WILLIAMSON  Referring Physician:       [id number] Sultana  Sonographer:               YOKASTA troncoso rvt  Study Type:  Technical Quality:         Adequate  Age:    52    Gender:     M  MRN:    [id number]  :    1965      BSA:  Indications:     RT LEG SWELLING  CPT Codes:  ICD Codes:  History:         Swelling of the rt limb  Limitations:     body habitus in calf  PROCEDURES:  rt lower ext venous doppler  FINDINGS:  Complete color filling and compressibility with normal venous flow dynamics  including spontaneous flow, response to augmentation maneuvers, and  respiratory phasicity.  The peroneal and posterior tibial veins are difficult to assess for  compressibility, but flow response to augmentation is demonstrated.  GSV DST THIGH Densely echogenic material is within  the vein that is  consistent with  chronic venous thrombosis. FLOW IS SEEN  ___vein is partially compressible.  AREA OF PAIN - edema obscures the visualization of the  lat/post calf  Germain Craven  (Electronically Signed)  Final Date:      2018                   07:11    Nm-cardiac Stress Test    Addendum Date: 2018    Baseline ECG:atrial fibrillation Stress ECG: No ischemic T wave changes with peak stress Impression: Normal stress ECG, see nuclear images     Result Date: 2018   Myocardial Perfusion  Report  NUCLEAR IMAGING INTERPRETATION  No evidence of significant jeopardized viable myocardium or prior myocardial  infarction.  Normal left ventricular size, ejection fraction, and wall motion.  EMELY FLORES  MRN:    [id number]         Gender:    M  Exam Date: 2018 06:35  Exam Location:      Inpatient  Ordering Phys:     ALETHEA MASSEY  NucMed Tech:       RT Rasheeda  Age:    52    :    1965        Ht (in):     72  Wt (lb):     298    BMI:    40.37       Radiologist  Risk Factors:             Family history of coronary disease, Diabetes,                            Hypertension  Indications:              Shortness of breath, Chest pain, unspecified  ICD Codes:                  R079  Cardiac History:          None reported  Cardiac Meds:  Meds Past 24 hrs:  Pretest Chest Pain:  STRESS TEST      Pharmacologi                   uche Agrawal   Lexiscan       Dose: 0.4 mg  ol:  Post-Injection Exercise:  Resting HR (bpm):      95  Peak HR (bpm):         109  Resting BP (mmHg):       122    /   77  Peak BP (mmHg):       131   /   92  MaxPHR:     166     Target HR (bpm):       141  % MaxPHR:     66  Double Product:       95797  BP Response:  Stress Termination:       Completed Protocol  Stress Symptoms:  Arrhythmia: None Chest Pain: None Dyspnea Flushing, Headache, Somach Cramps,  Leg Fatigue  ECG  Resting ECG:  Stress ECG:  IMAGE PROTOCOL      Rest/Stress 1                      Day           RadiopharmaceuticalDose (mCi)   Imaging  Date      Imaging  Time         Inj to Img Time (min)  Rest:   Tc-99m             7.4          29-Jan-2018        13:36          Tetrofosmin  Stress: Tc-99m             25.0         29-Jan-2018        14:27          Tetrofosmin  Rest:  Administration Site:       Left forearm  Administered by:      RT Rasheeda  Stress:  Administration Site:       Left forearm  Administered by:      Graeme Joaquin RN  % Percent HR Achieved:  SPECT RESULTS  Technical Quality:       Good  Raw Data Analysis:  Summed Stress Score:    2  Summed Rest Score:    2  Summed Difference Score:        2  PERFUSION:  Normal myocardial perfusion with no ischemia.  FUNCTIONAL RESULTS (calculated via Gated SPECT)  Stress Image LV EF:        64     %  Upper Normal Limit  Stress EDV:      92     ml   EDVI:    36      ml/m²  Stress ESV:      33     ml   ESVI:    13      ml/m²  TID:    0.96   TID - 1.19      TID (ed) - 1.23  LV Function:  Normal left ventricular wall motion.  LV ejection fraction = 64%.  Shmuel Rai  (Electronically Signed)  Final Date:      29 January 2018                   15:38    Ct-cta Chest Pulmonary Artery W/ Recons    Result Date: 2/2/2018 2/2/2018 4:04 AM HISTORY/REASON FOR EXAM:  Pain TECHNIQUE/EXAM DESCRIPTION: CT angiogram scan for pulmonary embolism with contrast, with reconstructions. 1.25 mm helical sections were obtained from the lung apices through the lung bases following the rapid bolus administration of 90 mL of Omnipaque 350 nonionic contrast. Thin-section overlapping reconstruction interval was utilized. Coronal reconstructions were generated from the axial data. MIP post processing was performed and utilized for the interpretation. Low dose optimization technique was utilized for this CT exam including automated exposure control and adjustment of the mA and/or kV according to patient size. COMPARISON: None FINDINGS: Pulmonary Embolism: No. Lungs: No  "suspicious nodules or air space process. Pleura: No pleural effusion. Nodes: No enlarged lymph nodes. Additional findings: There are multiple focal gas lucencies within the gallbladder, consistent with known opaque gallstones. Localized increased opacity is seen in the retroareolar regions of both breasts, consistent with gynecomastia..     1.  No evidence of pulmonary emboli or other acute intrathoracic abnormality. 2.  Cholelithiasis. 3.  Gynecomastia.       Micro:  Results     Procedure Component Value Units Date/Time    BLOOD CULTURE [[id number]] Collected:  02/17/18 2245    Order Status:  Completed Specimen:  Blood from Peripheral Updated:  02/23/18 0100     Significant Indicator NEG     Source BLD     Site PERIPHERAL     Blood Culture No growth after 5 days of incubation.    Narrative:       Per Hospital Policy: Only change Specimen Src: to \"Line\" if  specified by physician order.    BLOOD CULTURE [[id number]] Collected:  02/17/18 2316    Order Status:  Completed Specimen:  Blood from Peripheral Updated:  02/23/18 0100     Significant Indicator NEG     Source BLD     Site PERIPHERAL     Blood Culture No growth after 5 days of incubation.    Narrative:       Per Hospital Policy: Only change Specimen Src: to \"Line\" if  specified by physician order.    AFB CULTURE [[id number]] Collected:  02/19/18 1223    Order Status:  Completed Specimen:  Tissue Updated:  02/22/18 1452     Significant Indicator NEG     Source TISS     Site Right Foot     AFB Culture Culture in progress.     AFB Smear Results No acid fast bacilli seen.    CULTURE TISSUE W/ GRM STAIN [[id number]]  (Abnormal) Collected:  02/19/18 1223    Order Status:  Completed Specimen:  Tissue Updated:  02/22/18 1452     Gram Stain Result Few Gram positive cocci. (A)     Significant Indicator POS (POS)     Source TISS     Site Right Foot     Tissue Culture -- (A)     Tissue Culture -- (A)     Methicillin Resistant Staphylococcus aureus  Moderate growth  See " previous culture for sensitivity report.      ANAEROBIC CULTURE [[id number]] Collected:  02/19/18 1223    Order Status:  Completed Specimen:  Tissue Updated:  02/22/18 1452     Significant Indicator NEG     Source TISS     Site Right Foot     Anaerobic Culture, Culture Res No Anaerobes isolated.    FUNGAL CULTURE [[id number]] Collected:  02/19/18 1223    Order Status:  Completed Specimen:  Tissue Updated:  02/22/18 1452     Significant Indicator NEG     Source TISS     Site Right Foot     Fungal Culture Culture in progress.    GRAM STAIN [[id number]] Collected:  02/19/18 1223    Order Status:  Completed Specimen:  Tissue Updated:  02/20/18 2107     Significant Indicator .     Source TISS     Site Right Foot     Gram Stain Result Few Gram positive cocci.    ACID FAST STAIN [[id number]] Collected:  02/19/18 1223    Order Status:  Completed Specimen:  Tissue Updated:  02/20/18 2107     Significant Indicator NEG     Source TISS     Site Right Foot     AFB Smear Results No acid fast bacilli seen.    CULTURE WOUND W/ GRAM STAIN [[id number]]  (Abnormal)  (Susceptibility) Collected:  02/17/18 2236    Order Status:  Completed Specimen:  Wound from Right Foot Updated:  02/20/18 0925     Gram Stain Result --     Few WBCs.  Moderate Gram positive cocci.       Significant Indicator POS (POS)     Source WND     Site RIGHT FOOT     Culture Result Wound -- (A)     Culture Result Wound -- (A)     Methicillin Resistant Staphylococcus aureus  Heavy growth  This isolate is presumed to be clindamycin resistant based on  detection of inducible resistance.  Clindamycin may still  be effective in some patients.       Culture Result Wound -- (A)     Proteus mirabilis  Light growth      Culture & Susceptibility     METHICILLIN RESISTANT STAPHYLOCOCCUS AUREUS     Antibiotic Sensitivity Microscan Unit Status    Ampicillin/sulbactam Resistant 16/8 mcg/mL Final    Clindamycin Resistant 1 mcg/mL Final    Daptomycin Sensitive 1 mcg/mL Final     Erythromycin Resistant >4 mcg/mL Final    Moxifloxacin Resistant >4 mcg/mL Final    Oxacillin Resistant >2 mcg/mL Final    Penicillin Resistant >8 mcg/mL Final    Tetracycline Sensitive <=4 mcg/mL Final    Trimeth/Sulfa Sensitive <=0.5/9.5 mcg/mL Final    Vancomycin Sensitive 2 mcg/mL Final              PROTEUS MIRABILIS     Antibiotic Sensitivity Microscan Unit Status    Ampicillin Sensitive <=8 mcg/mL Final    Cefepime Sensitive <=8 mcg/mL Final    Cefotaxime Sensitive <=2 mcg/mL Final    Cefotetan Sensitive <=16 mcg/mL Final    Ceftazidime Sensitive <=1 mcg/mL Final    Ceftriaxone Sensitive <=8 mcg/mL Final    Cefuroxime Sensitive <=4 mcg/mL Final    Ciprofloxacin Sensitive <=1 mcg/mL Final    Ertapenem Sensitive <=1 mcg/mL Final    Gentamicin Sensitive <=4 mcg/mL Final    Pip/Tazobactam Sensitive <=16 mcg/mL Final    Tobramycin Sensitive <=4 mcg/mL Final    Trimeth/Sulfa Sensitive <=2/38 mcg/mL Final                       GRAM STAIN [[id number]] Collected:  02/17/18 2230    Order Status:  Completed Specimen:  Wound Updated:  02/18/18 1807     Significant Indicator .     Source WND     Site RIGHT FOOT     Gram Stain Result --     Few WBCs.  Moderate Gram positive cocci.            Assessment:  Active Hospital Problems    Diagnosis   • *Diabetic foot infection (CMS-[facility]) [E11.69, L08.9]   • Type 2 diabetes mellitus (CMS-[facility]) [E11.9]   • HLD (hyperlipidemia) [E78.5]   • HTN (hypertension) [I10]   • Atrial fibrillation (CMS-[facility]) [I48.91]       Plan:  Right foot abscess, slowly improving  Afebrile  Leukocytosis resolved  S/p I&D on 2/19. Multiple loculations were noted intraop  OR gram stain +GPC, cx MRSA  Wound cx - MRSA (vanco ISAMAR 2) and proteus  Prior cx +MRSA, efaecalis  Continue cipro  Continue linezolid due to reaction from vanco and dapto  Wound care   Anticipate 2 weeks of abx from 2/19  Stop date 03/05/18 - extend if clinically needed  Surgical site with signs of infection - need to see more improvement prior  to discharge    DM2  hgA1c 6.8  Maintain BS less than 150 to control infection    Hold discharge until surgical site clinically improved    CONSUELO Mixon

## 2018-02-24 NOTE — PROGRESS NOTES
LIMB PRESERVATION SERVICE NOTE:    Wound(s):Right foot first ray amputation S/P  Irrigation and debridement, multiloculated abscess, 2/19/18    right foot.  Allergies: Daptomycin; Pcn [penicillins]; Unasyn [ampicillin-sulbactam sodium]; and Vancomycin  Start of Care: 2/23/2018  Room/Bed  T335/02      Subjective:      History of Present Illness:   Past Medical History:   Diagnosis Date   • A-fib (CMS-HCC)    • Chest pain    • Diabetes (CMS-HCC)    • Diabetic neuropathy (CMS-HCC)    • Hypercholesterolemia    • Hypertension    • Morbid obesity (CMS-HCC)    • Noncompliance    • Normal cardiac stress test        Patient is a 53 y.o. male. Admitted for Diabetic foot (CMS-HCC),     Patient is well known to LPS and outpatient wound care services.        Patient presented with S/p R great toe amputation by Dr. Leo on 11/10/17 for infection causing sepsis. Right foot first ray amputation. S/P  Irrigation and debridement, multiloculated abscess, 2/19/18        Pain:        Patient resting comfortably, Pain on dressing change/palpation.        Objective:        PHYSICAL EXAMINATION:      General Appearance:  Well developed,  nourished, in no acute distress     Sensory Assessment        Patient sensation insensate bilaterally with light touch 10 of 10 points        Vascular Assessment        +2 DP, +2 PT bilaterally     Orthotic, protective, supportive devices:      Offloading  Patient in Osiris boot on Right LE, HWB OOB  Vitals    /84   Pulse 74   Temp 36.1 °C (97 °F)   Resp 16   Ht 1.829 m (6')   Wt (!) 133.3 kg (293 lb 14 oz)   SpO2 97%   BMI 39.86 kg/m²      Wound Characteristics                                                    Location:   Initial Evaluation  Date:2/23/2018   Tissue Type and %: 100% Black   Periwound: Red   Drainage: Scant Serous   Exposed structures Sutures - 100% approximated   Wound Edges:   Attached   Odor: None   S&S of Infection:   Edema/Erythema   Edema: Localized at Site    Sensation: Insensate               Measurements: Initial Evaluation  Date:2/23/2018   Length (cm) 3   Width (cm)    Depth (cm)    Tract/undermine            Tests and Measures:    Labs  Recent Labs      02/21/18   0523   WBC  8.9   RBC  4.16*   HEMOGLOBIN  11.9*   HEMATOCRIT  36.4*   MCV  87.5   MCH  28.6   MCHC  32.7*   RDW  52.1*   PLATELETCT  206   MPV  10.4     Recent Labs      02/21/18   0523   SODIUM  137   POTASSIUM  3.8   CHLORIDE  106   CO2  25   GLUCOSE  111*   BUN  15      Procedures:                Debridement :  None              Cleansed with: Normal Saline (NS)                                                                                    Periwound protected with: Skin Prep              Primary dressing: Aqag              Secondary Dressing: Foam              Other: Elastic Bandage     Patient Education:  Implications of loss of protective sensation (LOPS) discussed with patient- including increased risk for amputation.  Advised to check foot at least daily, moisturize foot, and to always wear protective foot wear.     Professional Collaboration: Bedside RN, ID     Assessment:       Wound etiology: Surgical     Wound Progress:  Initial Assessment     Rationale for Treatment: Aquacel Ag (Silver):  AgAq - Hydrofiber Silver to manage bioburden, absorb exudate, and maintain moist wound environment without laterally wicking exudate therefore reducing vj-wound maceration    Patient tolerance/compliance: Open to Education     Complicating factors: DM, Obesity     Need for ongoing  Wound Care: Nursing to do wound care, LPS will follow      Plan:      Treatment Plan and Recommendations:    Procedures:     Nursing to cleanse wound/periwound with Normal Saline.  Pat periwound dry and apply skin prep/No Sting to periwound.  Let air dry for 1-2 minutes.  Apply a piece of AqAg (Hydrofiber Silver), cut to size, to the wound bed.  Cover with non adhesive foam, cut to size, and secure with hypafix tape.    Notify wound team if wound deteriorates or fails to progress.    Frequency:   NURSING TO CHANGE  RIGHT TMA  DRESSING EVERY 72 HOURS AND PRN FOR SATURATION OR DISLODGEMENT      Dressing Orders Updated  Offloading boot for R foot, R foot HWB OOB  AB per ID    Anticipated discharge plans (X):  SNF:           Home Care: X          Outpatient Wound Center:            Self Care:            Other:           TBD:x

## 2018-02-24 NOTE — PROGRESS NOTES
[facility] Hospitalist Progress Note    Date of Service: 2/23/2018    Chief Complaint  53 y.o. male with history of right great toe amputation in November his wound cultures positive for MRSA and clinical course, was noncompliant with oral Zyvox, admitted 2/17/2018 with worsening of diabetes for wound on the right side, right foot cellulitis    Interval Problem Update  2/20 patient is stable overnight, no significant chief complaint.  Patient otherwise denies fever, chills, nausea, vomiting, adb pain, SOB, CP, headache, constipation, diarrhea, cough, or sputum.  Patient tolerated antibiotics yesterday. Start patient on Cipro and linezolid and continue with the Bactrim.    2/21 patient is stable and no significant chief complaint overnight. Denies fever, chills, nausea vomiting diarrhea. Patient currently pending PT OT. Still on antibiotics for MRSA.  2/22 patient stable however surgical wound does not look screen. Recommended to stay in the hospital. LPS and infectious disease specialist. Continue oral antibiotics.  2/23 patient is stable in chair in no significant overnight events. Surgical wound still looks infected. Recommend keeping the hospital for wound care.    Consultants/Specialty  Infectious disease  Orthopedics  Limb preservation  Disposition  Inpatient        Review of Systems   Constitutional: Negative.  Negative for fever, malaise/fatigue and weight loss.   HENT: Negative.  Negative for ear pain and tinnitus.    Eyes: Negative.  Negative for blurred vision, photophobia and pain.   Respiratory: Negative.  Negative for sputum production and shortness of breath.    Cardiovascular: Positive for claudication. Negative for chest pain and palpitations.   Gastrointestinal: Negative.  Negative for constipation, diarrhea, heartburn and vomiting.   Genitourinary: Negative.  Negative for dysuria, frequency and urgency.   Musculoskeletal: Positive for joint pain.        Swelling, erythema, pain, wants the right foot    Skin: Negative.  Negative for rash.   Neurological: Negative.    Endo/Heme/Allergies: Negative.    Psychiatric/Behavioral: Negative.    All other systems reviewed and are negative.     Physical Exam  Laboratory/Imaging   Hemodynamics  Temp (24hrs), Av.8 °C (98.3 °F), Min:36.4 °C (97.6 °F), Max:37.1 °C (98.7 °F)   Temperature: 36.4 °C (97.6 °F)  Pulse  Av.6  Min: 50  Max: 99    Blood Pressure: 146/84      Respiratory      Respiration: 13, Pulse Oximetry: 94 %        RUL Breath Sounds: Diminished, RML Breath Sounds: Diminished, RLL Breath Sounds: Diminished, ELIAS Breath Sounds: Diminished, LLL Breath Sounds: Diminished    Fluids  No intake or output data in the 24 hours ending 18 1601    Nutrition  Orders Placed This Encounter   Procedures   • DIET ORDER     Standing Status:   Standing     Number of Occurrences:   1     Order Specific Question:   Diet:     Answer:   Diabetic [3]     Physical Exam   Constitutional: He is oriented to person, place, and time. He appears well-developed and well-nourished. No distress.   HENT:   Head: Normocephalic and atraumatic.   Eyes: EOM are normal. Pupils are equal, round, and reactive to light. Left eye exhibits no discharge.   Neck: Normal range of motion. Neck supple. No JVD present. No thyromegaly present.   Cardiovascular: Normal rate, regular rhythm and normal heart sounds.  Exam reveals no friction rub.    No murmur heard.  Pulmonary/Chest: Effort normal and breath sounds normal. No respiratory distress. He has no wheezes. He exhibits no tenderness.   Abdominal: Soft. Bowel sounds are normal. He exhibits no distension and no mass. There is no tenderness. There is no rebound.   Musculoskeletal: Normal range of motion. He exhibits no edema or deformity.   Patient is surgical wound is still not clean   Neurological: He is alert and oriented to person, place, and time.   Skin: Skin is warm and dry. He is not diaphoretic.   Psychiatric: He has a normal mood and  affect. His behavior is normal.   Nursing note and vitals reviewed.      Recent Labs      02/21/18   0523   WBC  8.9   RBC  4.16*   HEMOGLOBIN  11.9*   HEMATOCRIT  36.4*   MCV  87.5   MCH  28.6   MCHC  32.7*   RDW  52.1*   PLATELETCT  206   MPV  10.4     Recent Labs      02/21/18   0523   SODIUM  137   POTASSIUM  3.8   CHLORIDE  106   CO2  25   GLUCOSE  111*   BUN  15   CREATININE  1.45*   CALCIUM  8.2*                      Assessment/Plan     * Diabetic foot infection (CMS-HCC)- (present on admission)   Assessment & Plan    Osteomyelitis and multiple right foot abscess , status post irrigation and debridement on February 19  -Staph aureus growing. +GPC  - Infectious disease consulted  -cont with cipro, linezolid,  Bactrim  - Tolerated antibiotics  - need HH and PT OT at home  -Patient's surgical wounds are clean, hold discharge for signs of surgical wound infection for wound care        Type 2 diabetes mellitus (CMS-HCC)- (present on admission)   Assessment & Plan    Holding metformin  insulin sliding scale. Hypoglycemia protocol        HTN (hypertension)- (present on admission)   Assessment & Plan    Continue Norvasc and Prinivil   Monitor Blood pressure        Atrial fibrillation (CMS-HCC)- (present on admission)   Assessment & Plan    Continue digoxin and metoprolol. Heart rate is controlled  On admission xarelto was felt and started IV heparin for surgery  After surgery, started on subcu Lovenox  Follow up with surgery to see if they  plan any more procedure, and if not, restart Xarelto        HLD (hyperlipidemia)- (present on admission)   Assessment & Plan     continue home Pravachol .          Quality-Core Measures   Reviewed items::  Labs reviewed, Radiology images reviewed and Medications reviewed  Zabala catheter::  No Zabala  DVT prophylaxis pharmacological::  Enoxaparin (Lovenox)  DVT prophylaxis - mechanical:  SCDs  Ulcer Prophylaxis::  Not indicated  Antibiotics:  Treating active infection/contamination  beyond 24 hours perioperative coverage   For complexity-based billing, please refer to the history, exam, and decison making above. In addition, I spent >35 minutes caring for the patient today. More than 50% of the time was spent counseling and coordinating care.    I have discussed with RN and CM and SW and other consultants about patient's plan.

## 2018-02-24 NOTE — PROGRESS NOTES
Pt /78. Dr. Mixon notified. MD to put the orders in.    Pt is also throwing up. Starting an IV access to administer antiemetics.

## 2018-02-24 NOTE — CARE PLAN
"Problem: Safety  Goal: Will remain free from injury  Call light within reach. Pt calls for assistance at all times.  Bed in lowest position, upper bedrails up, personal possessions within reach. Hourly rounding in place. Pt room situated near nursing station.           Problem: Pain Management  Goal: Pain level will decrease to patient's comfort goal  Pt denies pain, c/o mild \"discomfort\" to right foot. Assisted in repositioning and elevating foot, pt described feeling relief.       "

## 2018-02-24 NOTE — CARE PLAN
Problem: Safety  Goal: Will remain free from injury  Outcome: PROGRESSING AS EXPECTED  Safety precautions in place. Call light within reach. Bed is low and in locked position. Hourly rounding in place. Pt educated on calling for help when he needs to use the restroom or getting up from bed to chair. Pt verbalized understanding.     Problem: Bowel/Gastric:  Goal: Normal bowel function is maintained or improved  Outcome: PROGRESSING AS EXPECTED  Pt has been nauseas this shift. Zofran PO and compazine PO given with little effect. Per pt, IV Zofran works the best. Pt has no IV access. Will put one in.     Problem: Discharge Barriers/Planning  Goal: Patient's continuum of care needs will be met  Outcome: PROGRESSING AS EXPECTED

## 2018-02-25 LAB
ANION GAP SERPL CALC-SCNC: 9 MMOL/L (ref 0–11.9)
BUN SERPL-MCNC: 15 MG/DL (ref 8–22)
CALCIUM SERPL-MCNC: 9.2 MG/DL (ref 8.5–10.5)
CHLORIDE SERPL-SCNC: 102 MMOL/L (ref 96–112)
CO2 SERPL-SCNC: 25 MMOL/L (ref 20–33)
CREAT SERPL-MCNC: 1.12 MG/DL (ref 0.5–1.4)
GLUCOSE BLD-MCNC: 144 MG/DL (ref 65–99)
GLUCOSE BLD-MCNC: 152 MG/DL (ref 65–99)
GLUCOSE BLD-MCNC: 153 MG/DL (ref 65–99)
GLUCOSE BLD-MCNC: 156 MG/DL (ref 65–99)
GLUCOSE SERPL-MCNC: 160 MG/DL (ref 65–99)
POTASSIUM SERPL-SCNC: 4 MMOL/L (ref 3.6–5.5)
SODIUM SERPL-SCNC: 136 MMOL/L (ref 135–145)

## 2018-02-25 PROCEDURE — 700111 HCHG RX REV CODE 636 W/ 250 OVERRIDE (IP): Performed by: INTERNAL MEDICINE

## 2018-02-25 PROCEDURE — A9270 NON-COVERED ITEM OR SERVICE: HCPCS | Performed by: INTERNAL MEDICINE

## 2018-02-25 PROCEDURE — 82962 GLUCOSE BLOOD TEST: CPT | Mod: 91

## 2018-02-25 PROCEDURE — 80048 BASIC METABOLIC PNL TOTAL CA: CPT

## 2018-02-25 PROCEDURE — 700102 HCHG RX REV CODE 250 W/ 637 OVERRIDE(OP): Performed by: INTERNAL MEDICINE

## 2018-02-25 PROCEDURE — 700102 HCHG RX REV CODE 250 W/ 637 OVERRIDE(OP): Performed by: HOSPITALIST

## 2018-02-25 PROCEDURE — 700111 HCHG RX REV CODE 636 W/ 250 OVERRIDE (IP): Performed by: PHYSICIAN ASSISTANT

## 2018-02-25 PROCEDURE — A9270 NON-COVERED ITEM OR SERVICE: HCPCS | Performed by: HOSPITALIST

## 2018-02-25 PROCEDURE — 36415 COLL VENOUS BLD VENIPUNCTURE: CPT

## 2018-02-25 PROCEDURE — 99232 SBSQ HOSP IP/OBS MODERATE 35: CPT | Performed by: INTERNAL MEDICINE

## 2018-02-25 PROCEDURE — 770021 HCHG ROOM/CARE - ISO PRIVATE

## 2018-02-25 RX ORDER — SULFAMETHOXAZOLE AND TRIMETHOPRIM 800; 160 MG/1; MG/1
1 TABLET ORAL EVERY 12 HOURS
Status: DISCONTINUED | OUTPATIENT
Start: 2018-02-25 | End: 2018-02-26 | Stop reason: HOSPADM

## 2018-02-25 RX ADMIN — RIVAROXABAN 20 MG: 20 TABLET, FILM COATED ORAL at 17:13

## 2018-02-25 RX ADMIN — LISINOPRIL 40 MG: 20 TABLET ORAL at 07:39

## 2018-02-25 RX ADMIN — ENOXAPARIN SODIUM 40 MG: 100 INJECTION SUBCUTANEOUS at 07:40

## 2018-02-25 RX ADMIN — OXYCODONE HYDROCHLORIDE 5 MG: 5 TABLET ORAL at 20:43

## 2018-02-25 RX ADMIN — METOPROLOL TARTRATE 75 MG: 25 TABLET, FILM COATED ORAL at 20:43

## 2018-02-25 RX ADMIN — CIPROFLOXACIN HYDROCHLORIDE 500 MG: 500 TABLET, FILM COATED ORAL at 07:40

## 2018-02-25 RX ADMIN — ASPIRIN 81 MG: 81 TABLET, COATED ORAL at 07:39

## 2018-02-25 RX ADMIN — PRAVASTATIN SODIUM 20 MG: 10 TABLET ORAL at 20:43

## 2018-02-25 RX ADMIN — LINEZOLID 600 MG: 600 TABLET, FILM COATED ORAL at 07:40

## 2018-02-25 RX ADMIN — METOPROLOL TARTRATE 75 MG: 25 TABLET, FILM COATED ORAL at 07:39

## 2018-02-25 RX ADMIN — DIGOXIN 250 MCG: 250 TABLET ORAL at 07:38

## 2018-02-25 RX ADMIN — ONDANSETRON 4 MG: 4 TABLET, ORALLY DISINTEGRATING ORAL at 10:19

## 2018-02-25 RX ADMIN — AMLODIPINE BESYLATE 10 MG: 10 TABLET ORAL at 07:39

## 2018-02-25 RX ADMIN — FAMOTIDINE 20 MG: 20 TABLET, FILM COATED ORAL at 07:39

## 2018-02-25 RX ADMIN — SULFAMETHOXAZOLE AND TRIMETHOPRIM 1 TABLET: 800; 160 TABLET ORAL at 20:43

## 2018-02-25 RX ADMIN — ONDANSETRON HYDROCHLORIDE 4 MG: 2 INJECTION, SOLUTION INTRAMUSCULAR; INTRAVENOUS at 05:42

## 2018-02-25 RX ADMIN — FAMOTIDINE 20 MG: 20 TABLET, FILM COATED ORAL at 20:43

## 2018-02-25 ASSESSMENT — CHA2DS2 SCORE
CHF OR LEFT VENTRICULAR DYSFUNCTION: NO
DIABETES: YES
CHA2DS2 VASC SCORE: 2
AGE 75 OR GREATER: NO
SEX: MALE
PRIOR STROKE OR TIA OR THROMBOEMBOLISM: NO
AGE 65 TO 74: NO
HYPERTENSION: YES
VASCULAR DISEASE: NO

## 2018-02-25 ASSESSMENT — ENCOUNTER SYMPTOMS
CHILLS: 0
CLAUDICATION: 1
HEMOPTYSIS: 0
SPUTUM PRODUCTION: 0
GASTROINTESTINAL NEGATIVE: 1
HEARTBURN: 0
MYALGIAS: 1
CONSTITUTIONAL NEGATIVE: 1
FEVER: 0
NECK PAIN: 0
NAUSEA: 1
NEUROLOGICAL NEGATIVE: 1
VOMITING: 0
EYE DISCHARGE: 0
ABDOMINAL PAIN: 0
RESPIRATORY NEGATIVE: 1
PSYCHIATRIC NEGATIVE: 1
DIARRHEA: 0
SHORTNESS OF BREATH: 0
BLURRED VISION: 0
PHOTOPHOBIA: 0
PALPITATIONS: 0
EYES NEGATIVE: 1

## 2018-02-25 ASSESSMENT — PAIN SCALES - GENERAL
PAINLEVEL_OUTOF10: 5
PAINLEVEL_OUTOF10: 7

## 2018-02-25 ASSESSMENT — LIFESTYLE VARIABLES: DO YOU DRINK ALCOHOL: NO

## 2018-02-25 NOTE — PROGRESS NOTES
[facility] Hospitalist Progress Note    Date of Service: 2/25/2018    Chief Complaint  53 y.o. male with history of right great toe amputation in November his wound cultures positive for MRSA and clinical course, was noncompliant with oral Zyvox, admitted 2/17/2018 with worsening of diabetes for wound on the right side, right foot cellulitis    Interval Problem Update  2/20 patient is stable overnight, no significant chief complaint.  Patient otherwise denies fever, chills, nausea, vomiting, adb pain, SOB, CP, headache, constipation, diarrhea, cough, or sputum.  Patient tolerated antibiotics yesterday. Start patient on Cipro and linezolid and continue with the Bactrim.  2/21 patient is stable and no significant chief complaint overnight. Denies fever, chills, nausea vomiting diarrhea. Patient currently pending PT OT. Still on antibiotics for MRSA.  2/22 patient stable however surgical wound does not look screen. Recommended to stay in the hospital. LPS and infectious disease specialist. Continue oral antibiotics.  2/23 patient is stable in chair in no significant overnight events. Surgical wound still looks infected. Recommend keeping the hospital for wound care.  2/24 Patient otherwise denies fever, chills, nausea, vomiting, adb pain, SOB, CP, headache, constipation, diarrhea, cough, or sputum.  ID on case and still recom in hospital monitor for wound.  2/25 patient remained stable still complains nausea vomiting after oral antibiotics. Patient will wish he can change oral antibiotics to IV antibiotics. Patient's nausea vomiting currently controlled with IV Zofran. Patient otherwise denies fever, chills, nausea, vomiting, adb pain, SOB, CP, headache, constipation, diarrhea, cough, or sputum.    Consultants/Specialty  Infectious disease  Orthopedics  Limb preservation    Disposition  Inpatient        Review of Systems   Constitutional: Negative.  Negative for fever and malaise/fatigue.   HENT: Negative.  Negative for  ear pain and tinnitus.    Eyes: Negative.  Negative for blurred vision, photophobia and discharge.   Respiratory: Negative.  Negative for hemoptysis, sputum production and shortness of breath.    Cardiovascular: Positive for claudication. Negative for chest pain, palpitations and leg swelling.   Gastrointestinal: Negative.  Negative for diarrhea, heartburn and vomiting.   Genitourinary: Negative.  Negative for dysuria and frequency.   Musculoskeletal: Positive for joint pain. Negative for neck pain.        Swelling, erythema, pain, wants the right foot   Skin: Negative.  Negative for rash.   Neurological: Negative.    Endo/Heme/Allergies: Negative.    Psychiatric/Behavioral: Negative.    All other systems reviewed and are negative.     Physical Exam  Laboratory/Imaging   Hemodynamics  Temp (24hrs), Av.5 °C (97.7 °F), Min:35.9 °C (96.7 °F), Max:37.4 °C (99.3 °F)   Temperature: 36.4 °C (97.5 °F)  Pulse  Av.4  Min: 50  Max: 99    Blood Pressure: (!) 166/90      Respiratory      Respiration: 16, Pulse Oximetry: 98 %        RUL Breath Sounds: Diminished, RML Breath Sounds: Diminished, RLL Breath Sounds: Diminished, ELIAS Breath Sounds: Diminished, LLL Breath Sounds: Diminished    Fluids    Intake/Output Summary (Last 24 hours) at 18 0900  Last data filed at 18 1800   Gross per 24 hour   Intake             1600 ml   Output                0 ml   Net             1600 ml       Nutrition  Orders Placed This Encounter   Procedures   • DIET ORDER     Standing Status:   Standing     Number of Occurrences:   1     Order Specific Question:   Diet:     Answer:   Diabetic [3]     Physical Exam   Constitutional: He is oriented to person, place, and time. He appears well-developed and well-nourished. No distress.   HENT:   Right Ear: External ear normal.   Left Ear: External ear normal.   Eyes: EOM are normal. Pupils are equal, round, and reactive to light. Left eye exhibits no discharge.   Neck: Normal range of  motion. Neck supple. No JVD present. No thyromegaly present.   Cardiovascular: Normal rate and regular rhythm.  Exam reveals no gallop and no friction rub.    No murmur heard.  Pulmonary/Chest: Effort normal and breath sounds normal. He has no wheezes. He has no rales. He exhibits no tenderness.   Abdominal: Soft. Bowel sounds are normal. He exhibits no distension. There is no tenderness. There is no guarding.   Musculoskeletal: Normal range of motion. He exhibits no edema or deformity.   Patient is surgical wound is still not clean   Neurological: He is alert and oriented to person, place, and time.   Skin: Skin is warm and dry. He is not diaphoretic.   Psychiatric: He has a normal mood and affect. His behavior is normal.   Nursing note and vitals reviewed.                               Assessment/Plan     * Diabetic foot infection (CMS-HCC)- (present on admission)   Assessment & Plan    Osteomyelitis and multiple right foot abscess , status post irrigation and debridement on February 19  -Staph aureus growing. +GPC  - Infectious disease consulted  -cont with cipro, linezolid,  Bactrim  - Tolerated antibiotics  - need HH and PT OT at home, already arranged  -Patient's surgical wounds are still showing signs of the erythema, hold discharge for signs of surgical wound infection for wound care, pending ID recom  - ID on the case        Type 2 diabetes mellitus (CMS-HCC)- (present on admission)   Assessment & Plan    Holding metformin  insulin sliding scale. Hypoglycemia protocol        HTN (hypertension)- (present on admission)   Assessment & Plan    Continue Norvasc and Prinivil   Monitor Blood pressure        Atrial fibrillation (CMS-HCC)- (present on admission)   Assessment & Plan    Continue digoxin and metoprolol. Heart rate is controlled  On admission xarelto was felt and started IV heparin for surgery  After surgery, started on subcu Lovenox  Follow up with surgery to see if they  plan any more procedure, and  if not, restart Xarelto        HLD (hyperlipidemia)- (present on admission)   Assessment & Plan     continue home Pravachol .     Nausea vomiting :  - Likely due to oral antibiotics Zyvox   - cont iv zofran prn.     Quality-Core Measures   Reviewed items::  Labs reviewed, Radiology images reviewed and Medications reviewed  Zabala catheter::  No Zabala  DVT prophylaxis pharmacological::  Enoxaparin (Lovenox)  DVT prophylaxis - mechanical:  SCDs  Ulcer Prophylaxis::  Not indicated  Antibiotics:  Treating active infection/contamination beyond 24 hours perioperative coverage   For complexity-based billing, please refer to the history, exam, and decison making above. In addition, I spent >35 minutes caring for the patient today. More than 50% of the time was spent counseling and coordinating care.    I have discussed with RN and BRANDON and SW and other consultants about patient's plan.

## 2018-02-25 NOTE — CARE PLAN
Problem: Venous Thromboembolism (VTW)/Deep Vein Thrombosis (DVT) Prevention:  Goal: Patient will participate in Venous Thrombosis (VTE)/Deep Vein Thrombosis (DVT)Prevention Measures  Pt refuses SCDs, education provided. Lovenox given per MAR. Encouraged patient to ambulate as often as possible.     Problem: Pain Management  Goal: Pain level will decrease to patient's comfort goal  Pt denies need for pain medication thus far throughout shift. Assisted pt in repositioning and elevation of right foot.

## 2018-02-25 NOTE — PROGRESS NOTES
Infectious Disease Progress Note    Author: Jane Anderson M.D. Date & Time of service: 2018  2:30 PM    Chief Complaint:  FU right foot abscess    Interval History:   AF, WBc 9.9, had reaction to unasyn earlier today with rash, itching and tongue swelling, now on vancomycin, just returned from I&D, pain controlled, never had cephalosporins per pt   AF, no CBC, had rash, itching and tongue swelling with vanco, abx changed to linezolid which he is tolerating, also tolerating aztreonam   AF, WBC 8.9, pain controlled, tolerating cipro without issues, plan for surgical dressing removal today per pt   AF, no CBC, has pain, wound surgical site with edema and erythema   AF, Right , having nausea with abx but antiemetics helping, denies vomiting   AF, sharp pain in his R foot overnight, still with edema and erythema   Tmax 99.3, no CBC, having nausea with linezolid, surgical site with decreased drainage and erythema today on exam  Labs Reviewed, Medications Reviewed, Radiology Reviewed and Wound Reviewed.    Review of Systems:  Review of Systems   Constitutional: Negative for chills and fever.   Respiratory: Negative for shortness of breath.    Gastrointestinal: Positive for nausea. Negative for abdominal pain, diarrhea and vomiting.   Musculoskeletal: Positive for joint pain and myalgias.   Skin: Negative for itching and rash.       Hemodynamics:  Temp (24hrs), Av.5 °C (97.7 °F), Min:35.9 °C (96.7 °F), Max:37.4 °C (99.3 °F)  Temperature: 36.4 °C (97.5 °F)  Pulse  Av.4  Min: 50  Max: 99   Blood Pressure: (!) 166/90       Physical Exam:  Physical Exam   Constitutional: He is oriented to person, place, and time. He appears well-developed.   HENT:   Head: Normocephalic and atraumatic.   Eyes: EOM are normal. Pupils are equal, round, and reactive to light.   Neck: Neck supple.   Cardiovascular: Normal rate.    IRR   Pulmonary/Chest: Effort normal and breath sounds normal.    Abdominal: Soft. There is no tenderness.   Musculoskeletal:   R foot surgical site with sutures in place. No drainage +edema and erythema - decreased   Neurological: He is alert and oriented to person, place, and time.       Meds:    Current Facility-Administered Medications:   •  sulfamethoxazole-trimethoprim  •  amLODIPine  •  labetalol  •  insulin regular **AND** [CANCELED] Accu-Chek Q6 if NPO **AND** NOTIFY MD and PharmD **AND** glucose 4 g **AND** dextrose 50%  •  ciprofloxacin  •  ondansetron  •  ondansetron  •  prochlorperazine  •  prochlorperazine  •  enoxaparin (LOVENOX) injection  •  pravastatin  •  metoprolol  •  lisinopril  •  digoxin  •  aspirin  •  senna-docusate **AND** polyethylene glycol/lytes **AND** magnesium hydroxide **AND** bisacodyl  •  NS  •  acetaminophen  •  Notify provider if pain remains uncontrolled **AND** Use the numeric rating scale (NRS-11) on regular floors and Critical-Care Pain Observation Tool (CPOT) on ICUs/Trauma to assess pain **AND** Pulse Ox (Oximetry) **AND** [DISCONTINUED] Pharmacy Consult Request **AND** If patient difficult to arouse and/or has respiratory depression, stop any opiates that are currently infusing and call a Rapid Response. **AND** oxyCODONE immediate-release **AND** oxyCODONE immediate-release **AND** morphine injection  •  famotidine  •  diphenhydrAMINE    Labs:  No results for input(s): WBC, RBC, HEMOGLOBIN, HEMATOCRIT, MCV, MCH, RDW, PLATELETCT, MPV, NEUTSPOLYS, LYMPHOCYTES, MONOCYTES, EOSINOPHILS, BASOPHILS, RBCMORPHOLO in the last 72 hours.  No results for input(s): SODIUM, POTASSIUM, CHLORIDE, CO2, GLUCOSE, BUN, CPKTOTAL in the last 72 hours.  No results for input(s): ALBUMIN, TBILIRUBIN, ALKPHOSPHAT, TOTPROTEIN, ALTSGPT, ASTSGOT, CREATININE in the last 72 hours.    Imaging:  Dx-chest-for Picc Line Perform Procedure In: Patient's Room    Result Date: 1/20/2018 1/20/2018 11:02 PM HISTORY/REASON FOR EXAM:  PICC line placement. TECHNIQUE/EXAM  DESCRIPTION AND NUMBER OF VIEWS: Single view of the chest. COMPARISON: 11/20/2017 FINDINGS: Cardiac mediastinal contour is unchanged. Lungs show hypoinflation. Pulmonary vessels are again mildly prominent. No pleural fluid collection or pneumothorax. LEFT arm PICC line in place with tip at the mid SVC. No major bony abnormality is seen.     1.  Supportive tubing as described above. 2.  No pneumonia or pneumothorax. 3.  Pulmonary vasculature is again mildly prominent.    Dx-chest-limited (1 View)    Result Date: 2/1/2018 2/1/2018 1:00 PM HISTORY/REASON FOR EXAM:  Chest Pain Right chest pain TECHNIQUE/EXAM DESCRIPTION AND NUMBER OF VIEWS: Single portable view of the chest. COMPARISON: 1/26/2018 FINDINGS: No pulmonary infiltrates or consolidations are noted. No pleural effusion. No pneumothorax. Stable enlarged cardiopericardial silhouette.     Stable cardiomegaly No acute cardiopulmonary abnormalities are identified.    Dx-chest-limited (1 View)    Addendum Date: 1/26/2018    Addendum: Left PICC line is seen terminating overlying the expected location of the proximal superior vena cava    Result Date: 1/26/2018 1/26/2018 3:16 AM HISTORY/REASON FOR EXAM:  PICC line placement TECHNIQUE/EXAM DESCRIPTION:  Single AP view of the chest. COMPARISON: January 20, 2018 FINDINGS: Overlying cardiac leads are present. The heart is in the upper limits of normal for size. The mediastinal contour appears within normal limits.  The central pulmonary vasculature appears normal. The lungs appear well expanded bilaterally.  Bilateral lungs are clear. No significant pleural effusions are identified. The bony structures appear age-appropriate.     1.  No acute cardiopulmonary disease.    Dx-chest-portable (1 View)    Result Date: 2/8/2018 2/8/2018 2:57 PM HISTORY/REASON FOR EXAM:  Chest Pain Mid sternal chest pain. TECHNIQUE/EXAM DESCRIPTION AND NUMBER OF VIEWS: Single portable view of the chest. COMPARISON: 2/1/18 FINDINGS: No  pulmonary infiltrates or consolidations are noted. No pleural effusion. No pneumothorax. Stable enlarged cardiopericardial silhouette.     1. Stable cardiomegaly No pulmonary infiltrates or consolidations are noted.    Dx-foot-complete 3+ Right    Result Date: 2/17/2018 2/17/2018 11:04 PM HISTORY/REASON FOR EXAM:  Right foot infection, open wound TECHNIQUE/EXAM DESCRIPTION AND NUMBER OF VIEWS: 3 views of the RIGHT foot. COMPARISON:  Right foot x-ray 2/2/2018 FINDINGS: There has been first transmetatarsal amputation. The stump appears within normal limits. There is adjacent soft tissue swelling. 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 There are subluxations at multiple metatarsophalangeal joint. There is spurring at the insertion of achilles' tendon and origin of plantar fascia.     1.  Prior first transmetatarsal amputation 2.  Midfoot osteoarthritis 3.  calcaneal spurring    Dx-foot-complete 3+ Right    Result Date: 2/2/2018 2/2/2018 4:40 AM HISTORY/REASON FOR EXAM:  Right foot pain.. TECHNIQUE/EXAM DESCRIPTION AND NUMBER OF VIEWS:  3 views of the RIGHT foot. COMPARISON: 1/15/2018 FINDINGS: There is evidence of amputation through the proximal shaft of the first metatarsal, which has occurred since the prior images. There is generalized soft tissue swelling about the distal foot. No soft tissue gas however. No acute bony abnormality.     No acute bony abnormality.    Mr-foot-with & W/o Right    Result Date: 2/19/2018 2/18/2018 4:39 PM HISTORY/REASON FOR EXAM:  Open wound. Rt Foot pain, Open Wound Mid Foot TECHNIQUE/EXAM DESCRIPTION: MRI of the RIGHT foot with and without contrast. The study was performed on a Nighat 3.0 Taya MRI scanner. T1 axial, T1 sagittal, T1 coronal, STIR sagittal, T2 fast spin-echo fat-suppressed coronal, sagittal inversion recovery, and T1 postcontrast coronal images were obtained. 20 mL Omniscan contrast was administered intravenously. COMPARISON: 1/16/2018. FINDINGS: Interval further  amputation of the first ray with small residual first metatarsal stump. There is a deep soft tissue ulcer at the amputation bed with extensive phlegmonous change. There are susceptibility artifact foci within the surgical bed which could relate to gas. There is tenosynovitis of the distal extensor hallucis longus underneath the ulcer. There is bone marrow replacement in the first metatarsal stump, consistent with osteomyelitis.     MR evidence of osteomyelitis within the first metatarsal stump. Deep soft tissue ulcer at the amputation bed with extensive phlegmonous change and possible gas. No discrete fluid collection seen. There is tenosynovitis of the distal extensor hallucis longus underneath the ulcer.     Echocardiogram-comp W/ Cont    Result Date: 2018  Transthoracic Echo Report Echocardiography Laboratory CONCLUSIONS Technically difficult but adequate study for interpretation. Contrast was used to enhance visualization of the endocardial border. Normal left ventricular chamber size. Normal left ventricular systolic function. Left ventricular ejection fraction is visually estimated to be 60%. Mild concentric left ventricular hypertrophy. Trace mitral regurgitation. Normal left atrial size. Structurally normal aortic valve without significant stenosis or regurgitation. Mild tricuspid regurgitation. Estimated right ventricular systolic pressure  is 35 mmHg. Normal inferior vena cava size and inspiratory collapse. Normal right ventricular size and systolic function. EMELY FLORES Exam Date:         2018                    08:43 Exam Location:     Inpatient Priority:          Routine Ordering Physician:        FABIAN RAMIRES Referring Physician: Sonographer:               Sherry Funes RDCS Age:    52     Gender:    M MRN:    [id number] :    1965 BSA:    2.51   Ht (in):    72     Wt (lb):    295 Exam Type:     Complete, Contrast Indications:     Paroxysmal atrial fibrillation ICD Codes:        I480 CPT Codes:       78439,  BP:   145    /   101    HR:   110 Technical Quality:       Technically difficult study -                          adequate information is obtained MEASUREMENTS  (Male / Female) Normal Values 2D ECHO LV Diastolic Diameter PLAX        4.4 cm                4.2 - 5.9 / 3.9 - 5.3 cm LV Systolic Diameter PLAX         2.5 cm                2.1 - 4.0 cm IVS Diastolic Thickness           1.5 cm                LVPW Diastolic Thickness          1.1 cm                RV Diameter 4C                    3.2 cm                2.5 - 2.9 cm LVOT Diameter                     2.1 cm                RA Diameter                       3.6 cm                Estimated LV Ejection Fraction    60 %                  LV Ejection Fraction MOD BP       54.7 %                >= 55  % LV Ejection Fraction MOD 4C       64.7 %                LV Ejection Fraction MOD 2C       55.5 %                LA Volume Index                   28.8 cm³/m²           16 - 28 cm³/m² IVC Diameter                      1.5 cm                M-MODE Aortic Root Diameter MM           3.9 cm                DOPPLER AV Peak Velocity                  1.3 m/s               AV Peak Gradient                  7 mmHg                AV Mean Gradient                  3.5 mmHg              LVOT Peak Velocity                1.2 m/s               AV Area Cont Eq vti               3 cm²                 TR Peak Velocity                  239 cm/s              PV Peak Velocity                  0.91 m/s              PV Peak Gradient                  3.3 mmHg              RVOT Peak Velocity                0.69 m/s              * Indicates values subject to auto-interpretation LV EF:  60    % FINDINGS Left Ventricle 3 mL of contrast was administered. Contrast was used to enhance visualization of the endocardial border. Existing IV was used. Normal left ventricular chamber size. Mild concentric left ventricular hypertrophy. Normal left ventricular systolic  function. Left ventricular ejection fraction is visually estimated to be 60%. Diastolic function is difficult to assess with atrial fibrillation. Right Ventricle Normal right ventricular size and systolic function. Right Atrium Normal right atrial size. Normal inferior vena cava size and inspiratory collapse. Left Atrium Normal left atrial size. Mitral Valve Thickened mitral valve leaflets. Trace mitral regurgitation. Aortic Valve Structurally normal aortic valve without significant stenosis or regurgitation. Tricuspid Valve Mild tricuspid regurgitation. Estimated right ventricular systolic pressure  is 35 mmHg. Pulmonic Valve Structurally normal pulmonic valve without significant stenosis or regurgitation. Pericardium Normal pericardium without effusion. Aorta Aortic root dilatation measuring 3.9 cm. Rocio Ross M.D. (Electronically Signed) Final Date:     2018                 14:23    Le Venous Duplex (specify In Comments Left, Right Or Bilateral)    Result Date: 2018   Vascular Laboratory  CONCLUSIONS  Normal bilateral superficial and deep venous examination of the right leg.  EMELY FLORES  Exam Date:     2018 04:36  Room #:     Inpatient  Priority:     Routine  Ht (in):             Wt (lb):  Ordering Physician:        SHIRLEY WILLIAMSON  Referring Physician:       [id number] Sultana  Sonographer:               YOKASTA troncoso rvt  Study Type:  Technical Quality:         Adequate  Age:    52    Gender:     M  MRN:    [id number]  :    1965      BSA:  Indications:     RT LEG SWELLING  CPT Codes:  ICD Codes:  History:         Swelling of the rt limb  Limitations:     body habitus in calf  PROCEDURES:  rt lower ext venous doppler  FINDINGS:  Complete color filling and compressibility with normal venous flow dynamics  including spontaneous flow, response to augmentation maneuvers, and  respiratory phasicity.  The peroneal and posterior tibial veins are difficult to assess for   compressibility, but flow response to augmentation is demonstrated.  GSV DST THIGH Densely echogenic material is within  the vein that is  consistent with chronic venous thrombosis. FLOW IS SEEN  ___vein is partially compressible.  AREA OF PAIN - edema obscures the visualization of the  lat/post calf  Germain Craven  (Electronically Signed)  Final Date:      2018                   07:11    Nm-cardiac Stress Test    Addendum Date: 2018    Baseline ECG:atrial fibrillation Stress ECG: No ischemic T wave changes with peak stress Impression: Normal stress ECG, see nuclear images     Result Date: 2018   Myocardial Perfusion  Report  NUCLEAR IMAGING INTERPRETATION  No evidence of significant jeopardized viable myocardium or prior myocardial  infarction.  Normal left ventricular size, ejection fraction, and wall motion.  EMELY FLORES  MRN:    [id number]         Gender:    M  Exam Date: 2018 06:35  Exam Location:      Inpatient  Ordering Phys:     ALETHEA MASSEY  NucMed Tech:       Clarice Escoto, RT  Age:    52    :    1965        Ht (in):     72  Wt (lb):     298    BMI:    40.37       Radiologist  Risk Factors:             Family history of coronary disease, Diabetes,                            Hypertension  Indications:              Shortness of breath, Chest pain, unspecified  ICD Codes:                  R079  Cardiac History:          None reported  Cardiac Meds:  Meds Past 24 hrs:  Pretest Chest Pain:  STRESS TEST      Pharmacologjesse Agrawal   Lexiscan       Dose: 0.4 mg  ol:  Post-Injection Exercise:  Resting HR (bpm):      95  Peak HR (bpm):         109  Resting BP (mmHg):       122    /   77  Peak BP (mmHg):       131   /   92  MaxPHR:     166     Target HR (bpm):       141  % MaxPHR:     66  Double Product:       33152  BP Response:  Stress Termination:       Completed Protocol  Stress Symptoms:  Arrhythmia: None Chest Pain: None  Dyspnea Flushing, Headache, Somach Cramps,  Leg Fatigue  ECG  Resting ECG:  Stress ECG:  IMAGE PROTOCOL      Rest/Stress 1                      Day          RadiopharmaceuticalDose (mCi)   Imaging  Date      Imaging  Time         Inj to Img Time (min)  Rest:   Tc-99m             7.4          29-Jan-2018        13:36          Tetrofosmin  Stress: Tc-99m             25.0         29-Jan-2018        14:27          Tetrofosmin  Rest:  Administration Site:       Left forearm  Administered by:      RT Rasheeda  Stress:  Administration Site:       Left forearm  Administered by:      Graeme Joaquin RN  % Percent HR Achieved:  SPECT RESULTS  Technical Quality:       Good  Raw Data Analysis:  Summed Stress Score:    2  Summed Rest Score:    2  Summed Difference Score:        2  PERFUSION:  Normal myocardial perfusion with no ischemia.  FUNCTIONAL RESULTS (calculated via Gated SPECT)  Stress Image LV EF:        64     %  Upper Normal Limit  Stress EDV:      92     ml   EDVI:    36      ml/m²  Stress ESV:      33     ml   ESVI:    13      ml/m²  TID:    0.96   TID - 1.19      TID (ed) - 1.23  LV Function:  Normal left ventricular wall motion.  LV ejection fraction = 64%.  Shmuel Rai  (Electronically Signed)  Final Date:      29 January 2018                   15:38    Ct-cta Chest Pulmonary Artery W/ Recons    Result Date: 2/2/2018 2/2/2018 4:04 AM HISTORY/REASON FOR EXAM:  Pain TECHNIQUE/EXAM DESCRIPTION: CT angiogram scan for pulmonary embolism with contrast, with reconstructions. 1.25 mm helical sections were obtained from the lung apices through the lung bases following the rapid bolus administration of 90 mL of Omnipaque 350 nonionic contrast. Thin-section overlapping reconstruction interval was utilized. Coronal reconstructions were generated from the axial data. MIP post processing was performed and utilized for the interpretation. Low dose optimization technique was utilized for this CT exam including  "automated exposure control and adjustment of the mA and/or kV according to patient size. COMPARISON: None FINDINGS: Pulmonary Embolism: No. Lungs: No suspicious nodules or air space process. Pleura: No pleural effusion. Nodes: No enlarged lymph nodes. Additional findings: There are multiple focal gas lucencies within the gallbladder, consistent with known opaque gallstones. Localized increased opacity is seen in the retroareolar regions of both breasts, consistent with gynecomastia..     1.  No evidence of pulmonary emboli or other acute intrathoracic abnormality. 2.  Cholelithiasis. 3.  Gynecomastia.       Micro:  Results     Procedure Component Value Units Date/Time    BLOOD CULTURE [[id number]] Collected:  02/17/18 2245    Order Status:  Completed Specimen:  Blood from Peripheral Updated:  02/23/18 0100     Significant Indicator NEG     Source BLD     Site PERIPHERAL     Blood Culture No growth after 5 days of incubation.    Narrative:       Per Hospital Policy: Only change Specimen Src: to \"Line\" if  specified by physician order.    BLOOD CULTURE [[id number]] Collected:  02/17/18 2316    Order Status:  Completed Specimen:  Blood from Peripheral Updated:  02/23/18 0100     Significant Indicator NEG     Source BLD     Site PERIPHERAL     Blood Culture No growth after 5 days of incubation.    Narrative:       Per Hospital Policy: Only change Specimen Src: to \"Line\" if  specified by physician order.    AFB CULTURE [[id number]] Collected:  02/19/18 1223    Order Status:  Completed Specimen:  Tissue Updated:  02/22/18 1452     Significant Indicator NEG     Source TISS     Site Right Foot     AFB Culture Culture in progress.     AFB Smear Results No acid fast bacilli seen.    CULTURE TISSUE W/ GRM STAIN [[id number]]  (Abnormal) Collected:  02/19/18 1223    Order Status:  Completed Specimen:  Tissue Updated:  02/22/18 1452     Gram Stain Result Few Gram positive cocci. (A)     Significant Indicator POS (POS)     Source TISS "     Site Right Foot     Tissue Culture -- (A)     Tissue Culture -- (A)     Methicillin Resistant Staphylococcus aureus  Moderate growth  See previous culture for sensitivity report.      ANAEROBIC CULTURE [[id number]] Collected:  02/19/18 1223    Order Status:  Completed Specimen:  Tissue Updated:  02/22/18 1452     Significant Indicator NEG     Source TISS     Site Right Foot     Anaerobic Culture, Culture Res No Anaerobes isolated.    FUNGAL CULTURE [[id number]] Collected:  02/19/18 1223    Order Status:  Completed Specimen:  Tissue Updated:  02/22/18 1452     Significant Indicator NEG     Source TISS     Site Right Foot     Fungal Culture Culture in progress.    GRAM STAIN [[id number]] Collected:  02/19/18 1223    Order Status:  Completed Specimen:  Tissue Updated:  02/20/18 2107     Significant Indicator .     Source TISS     Site Right Foot     Gram Stain Result Few Gram positive cocci.    ACID FAST STAIN [[id number]] Collected:  02/19/18 1223    Order Status:  Completed Specimen:  Tissue Updated:  02/20/18 2107     Significant Indicator NEG     Source TISS     Site Right Foot     AFB Smear Results No acid fast bacilli seen.    CULTURE WOUND W/ GRAM STAIN [[id number]]  (Abnormal)  (Susceptibility) Collected:  02/17/18 2236    Order Status:  Completed Specimen:  Wound from Right Foot Updated:  02/20/18 0925     Gram Stain Result --     Few WBCs.  Moderate Gram positive cocci.       Significant Indicator POS (POS)     Source WND     Site RIGHT FOOT     Culture Result Wound -- (A)     Culture Result Wound -- (A)     Methicillin Resistant Staphylococcus aureus  Heavy growth  This isolate is presumed to be clindamycin resistant based on  detection of inducible resistance.  Clindamycin may still  be effective in some patients.       Culture Result Wound -- (A)     Proteus mirabilis  Light growth      Culture & Susceptibility     METHICILLIN RESISTANT STAPHYLOCOCCUS AUREUS     Antibiotic Sensitivity Microscan Unit Status     Ampicillin/sulbactam Resistant 16/8 mcg/mL Final    Clindamycin Resistant 1 mcg/mL Final    Daptomycin Sensitive 1 mcg/mL Final    Erythromycin Resistant >4 mcg/mL Final    Moxifloxacin Resistant >4 mcg/mL Final    Oxacillin Resistant >2 mcg/mL Final    Penicillin Resistant >8 mcg/mL Final    Tetracycline Sensitive <=4 mcg/mL Final    Trimeth/Sulfa Sensitive <=0.5/9.5 mcg/mL Final    Vancomycin Sensitive 2 mcg/mL Final              PROTEUS MIRABILIS     Antibiotic Sensitivity Microscan Unit Status    Ampicillin Sensitive <=8 mcg/mL Final    Cefepime Sensitive <=8 mcg/mL Final    Cefotaxime Sensitive <=2 mcg/mL Final    Cefotetan Sensitive <=16 mcg/mL Final    Ceftazidime Sensitive <=1 mcg/mL Final    Ceftriaxone Sensitive <=8 mcg/mL Final    Cefuroxime Sensitive <=4 mcg/mL Final    Ciprofloxacin Sensitive <=1 mcg/mL Final    Ertapenem Sensitive <=1 mcg/mL Final    Gentamicin Sensitive <=4 mcg/mL Final    Pip/Tazobactam Sensitive <=16 mcg/mL Final    Tobramycin Sensitive <=4 mcg/mL Final    Trimeth/Sulfa Sensitive <=2/38 mcg/mL Final                       GRAM STAIN [[id number]] Collected:  02/17/18 6856    Order Status:  Completed Specimen:  Wound Updated:  02/18/18 1801     Significant Indicator .     Source WND     Site RIGHT FOOT     Gram Stain Result --     Few WBCs.  Moderate Gram positive cocci.            Assessment:  Active Hospital Problems    Diagnosis   • *Diabetic foot infection (CMS-HCC) [E11.69, L08.9]   • Type 2 diabetes mellitus (CMS-HCC) [E11.9]   • HLD (hyperlipidemia) [E78.5]   • HTN (hypertension) [I10]   • Atrial fibrillation (CMS-[facility]) [I48.91]       Plan:  Right foot abscess, slowly improving  Afebrile  Leukocytosis resolved  S/p I&D on 2/19. Multiple loculations were noted intraop  OR gram stain +GPC, cx MRSA  Wound cx - MRSA (vanco ISAMAR 2) and proteus  Prior cx +MRSA, efaecalis  DC cipro  DC linezolid due to nausea  Change to bactrim (covers both pathogens)  Wound care   Anticipate 2 weeks  of abx from 2/19  Stop date 03/05/18 - extend if clinically needed    DM2  hgA1c 6.8  Maintain BS less than 150 to control infection    If improving tmrw, ok to d/c home on bactrim if tolerating.    CONSUELO Mixon

## 2018-02-25 NOTE — PROGRESS NOTES
Patient alert and oriented. Dressing to foot Clean dry and intact. Pain managed with PRN pain medications. Complains of nausea intermittently, managed with prn medication. Up self in room. No complaints at this time.

## 2018-02-26 VITALS
DIASTOLIC BLOOD PRESSURE: 94 MMHG | BODY MASS INDEX: 39.8 KG/M2 | RESPIRATION RATE: 18 BRPM | OXYGEN SATURATION: 95 % | HEIGHT: 72 IN | WEIGHT: 293.87 LBS | HEART RATE: 68 BPM | TEMPERATURE: 97.6 F | SYSTOLIC BLOOD PRESSURE: 176 MMHG

## 2018-02-26 LAB
GLUCOSE BLD-MCNC: 138 MG/DL (ref 65–99)
GLUCOSE BLD-MCNC: 147 MG/DL (ref 65–99)

## 2018-02-26 PROCEDURE — 700102 HCHG RX REV CODE 250 W/ 637 OVERRIDE(OP): Performed by: INTERNAL MEDICINE

## 2018-02-26 PROCEDURE — A9270 NON-COVERED ITEM OR SERVICE: HCPCS | Performed by: INTERNAL MEDICINE

## 2018-02-26 PROCEDURE — 700102 HCHG RX REV CODE 250 W/ 637 OVERRIDE(OP): Performed by: HOSPITALIST

## 2018-02-26 PROCEDURE — 700111 HCHG RX REV CODE 636 W/ 250 OVERRIDE (IP): Performed by: INTERNAL MEDICINE

## 2018-02-26 PROCEDURE — A9270 NON-COVERED ITEM OR SERVICE: HCPCS | Performed by: HOSPITALIST

## 2018-02-26 PROCEDURE — 99239 HOSP IP/OBS DSCHRG MGMT >30: CPT | Performed by: INTERNAL MEDICINE

## 2018-02-26 PROCEDURE — 82962 GLUCOSE BLOOD TEST: CPT | Mod: 91

## 2018-02-26 RX ORDER — SULFAMETHOXAZOLE AND TRIMETHOPRIM 800; 160 MG/1; MG/1
1 TABLET ORAL EVERY 12 HOURS
Qty: 28 TAB | Refills: 0 | Status: SHIPPED | OUTPATIENT
Start: 2018-02-26 | End: 2018-03-12

## 2018-02-26 RX ADMIN — FAMOTIDINE 20 MG: 20 TABLET, FILM COATED ORAL at 08:02

## 2018-02-26 RX ADMIN — SULFAMETHOXAZOLE AND TRIMETHOPRIM 1 TABLET: 800; 160 TABLET ORAL at 08:02

## 2018-02-26 RX ADMIN — DIGOXIN 250 MCG: 250 TABLET ORAL at 08:03

## 2018-02-26 RX ADMIN — AMLODIPINE BESYLATE 10 MG: 10 TABLET ORAL at 08:02

## 2018-02-26 RX ADMIN — METOPROLOL TARTRATE 75 MG: 25 TABLET, FILM COATED ORAL at 08:01

## 2018-02-26 RX ADMIN — ASPIRIN 81 MG: 81 TABLET, COATED ORAL at 08:02

## 2018-02-26 RX ADMIN — ONDANSETRON 4 MG: 4 TABLET, ORALLY DISINTEGRATING ORAL at 07:56

## 2018-02-26 RX ADMIN — LISINOPRIL 40 MG: 20 TABLET ORAL at 08:02

## 2018-02-26 ASSESSMENT — PAIN SCALES - GENERAL
PAINLEVEL_OUTOF10: 1
PAINLEVEL_OUTOF10: 1

## 2018-02-26 ASSESSMENT — LIFESTYLE VARIABLES: EVER_SMOKED: NEVER

## 2018-02-26 ASSESSMENT — ENCOUNTER SYMPTOMS
MYALGIAS: 1
VOMITING: 0
SHORTNESS OF BREATH: 0
NAUSEA: 0
DIARRHEA: 0
FEVER: 0
ABDOMINAL PAIN: 0
CHILLS: 0

## 2018-02-26 NOTE — PROGRESS NOTES
Patient seen and examined    Blood pressure 159/88, pulse 71, temperature 36.6 °C (97.8 °F), resp. rate 16, height 1.829 m (6'), weight (!) 133.3 kg (293 lb 14 oz), SpO2 97 %.          No acute distress  Dressing clean dry and intact  Neurovascularly intact    POD#7    Plan:  DVT Prophylaxis- TEDS/SCDs  Weight Bearing Status-WBAT  PT/OT  Antibiotics: per ID  Case Coordination

## 2018-02-26 NOTE — PROGRESS NOTES
Patient discharge home with Spencer C , walker at bedside dressing changed on foot and boot in place.  No Rx, discharge instructions reviewed.  Patient knows [facility] will follow with wound care.  No other concerns for pain and discomfort at this time.  1413  Patient left floor in wheel chair with transport , has personal walker

## 2018-02-26 NOTE — CARE PLAN
Problem: Safety  Goal: Will remain free from injury  Outcome: PROGRESSING AS EXPECTED  Call light within reach. Pt calls for assistance at all times.  Bed in lowest position, upper bedrails up, personal possessions within reach, treaded socks on.  Hourly rounding in place.          Problem: Pain Management  Goal: Pain level will decrease to patient's comfort goal  upon initial assessment pt expressed pain 7/10 to right foot. Described that after dressing change earlier in say foot has been having more pain than prior days. Medicated with 5mg oxycodone per MAR with relief. Dressing to E CDI.

## 2018-02-26 NOTE — DISCHARGE INSTRUCTIONS
Discharge Instructions    Discharged to home by taxi with self. Discharged via wheelchair, hospital escort: Yes.  Special equipment needed: Walker    Be sure to schedule a follow-up appointment with your primary care doctor or any specialists as instructed.     Discharge Plan:   Diet Plan: Discussed  Activity Level: Discussed  Confirmed Follow up Appointment: Patient to Call and Schedule Appointment  Confirmed Symptoms Management: Discussed  Medication Reconciliation Updated: Yes  Influenza Vaccine Indication: Not indicated: Previously immunized this influenza season and > 8 years of age    I understand that a diet low in cholesterol, fat, and sodium is recommended for good health. Unless I have been given specific instructions below for another diet, I accept this instruction as my diet prescription.   Other diet: diabetic    Special Instructions: None    · Is patient discharged on Warfarin / Coumadin?   No     Depression / Suicide Risk    As you are discharged from this [facility] facility, it is important to learn how to keep safe from harming yourself.    Recognize the warning signs:  · Abrupt changes in personality, positive or negative- including increase in energy   · Giving away possessions  · Change in eating patterns- significant weight changes-  positive or negative  · Change in sleeping patterns- unable to sleep or sleeping all the time   · Unwillingness or inability to communicate  · Depression  · Unusual sadness, discouragement and loneliness  · Talk of wanting to die  · Neglect of personal appearance   · Rebelliousness- reckless behavior  · Withdrawal from people/activities they love  · Confusion- inability to concentrate     If you or a loved one observes any of these behaviors or has concerns about self-harm, here's what you can do:  · Talk about it- your feelings and reasons for harming yourself  · Remove any means that you might use to hurt yourself (examples: pills, rope, extension cords,  firearm)  · Get professional help from the community (Mental Health, Substance Abuse, psychological counseling)  · Do not be alone:Call your Safe Contact- someone whom you trust who will be there for you.  · Call your local CRISIS HOTLINE 313-9140 or 632-464-9284  · Call your local Children's Mobile Crisis Response Team [place] (744) 991-3490 or www.[employer]  · Call the toll free National Suicide Prevention Hotlines   · National Suicide Prevention Lifeline 941-558-XANX (8238)  · [employer] 800-SUICIDE (050-6299)      Wound Care  Taking care of your wound properly can help to prevent pain and infection. It can also help your wound to heal more quickly.   HOW TO CARE FOR YOUR WOUND   · Take or apply over-the-counter and prescription medicines only as told by your health care provider.  · If you were prescribed antibiotic medicine, take or apply it as told by your health care provider. Do not stop using the antibiotic even if your condition improves.  · Clean the wound each day or as told by your health care provider.  ¨ Wash the wound with mild soap and water.  ¨ Rinse the wound with water to remove all soap.  ¨ Pat the wound dry with a clean towel. Do not rub it.  · There are many different ways to close and cover a wound. For example, a wound can be covered with stitches (sutures), skin glue, or adhesive strips. Follow instructions from your health care provider about:  ¨ How to take care of your wound.  ¨ When and how you should change your bandage (dressing).  ¨ When you should remove your dressing.  ¨ Removing whatever was used to close your wound.  · Check your wound every day for signs of infection. Watch for:  ¨ Redness, swelling, or pain.  ¨ Fluid, blood, or pus.  · Keep the dressing dry until your health care provider says it can be removed. Do not take baths, swim, use a hot tub, or do anything that would put your wound underwater until your health care provider  approves.  · Raise (elevate) the injured area above the level of your heart while you are sitting or lying down.  · Do not scratch or pick at the wound.  · Keep all follow-up visits as told by your health care provider. This is important.  SEEK MEDICAL CARE IF:  · You received a tetanus shot and you have swelling, severe pain, redness, or bleeding at the injection site.  · You have a fever.  · Your pain is not controlled with medicine.  · You have increased redness, swelling, or pain at the site of your wound.  · You have fluid, blood, or pus coming from your wound.  · You notice a bad smell coming from your wound or your dressing.  SEEK IMMEDIATE MEDICAL CARE IF:  · You have a red streak going away from your wound.     This information is not intended to replace advice given to you by your health care provider. Make sure you discuss any questions you have with your health care provider.     Document Released: 09/26/2009 Document Revised: 05/03/2016 Document Reviewed: 12/14/2015  [employer] Interactive Patient Education ©2016 [employer]  How to Avoid Diabetes Problems  You can do a lot to prevent or slow down diabetes problems. Following your diabetes plan and taking care of yourself can reduce your risk of serious or life-threatening complications. Below, you will find certain things you can do to prevent diabetes problems.  MANAGE YOUR DIABETES  Follow your health care provider's, nurse educator's, and dietitian's instructions for managing your diabetes. They will teach you the basics of diabetes care. They can help answer questions you may have. Learn about diabetes and make healthy choices regarding eating and physical activity. Monitor your blood glucose level regularly. Your health care provider will help you decide how often to check your blood glucose level depending on your treatment goals and how well you are meeting them.   DO NOT USE NICOTINE  Nicotine and diabetes are a dangerous combination. Nicotine  raises your risk for diabetes problems. If you quit using nicotine, you will lower your risk for heart attack, stroke, nerve disease, and kidney disease. Your cholesterol and your blood pressure levels may improve. Your blood circulation will also improve. Do not use any tobacco products, including cigarettes, chewing tobacco, or electronic cigarettes. If you need help quitting, ask your health care provider.  KEEP YOUR BLOOD PRESSURE UNDER CONTROL  Your health care provider will determine your individualized target blood pressure based on your age, your medicines, how long you have had diabetes, and any other medical conditions you have. Blood pressure consists of two numbers. Generally, the goal is to keep your top number (systolic pressure) at or below 130, and your bottom number (diastolic pressure) at or below 80. Your health care provider may recommend a lower target blood pressure reading, if appropriate. Meal planning, medicines, and exercise can help you reach your target blood pressure. Make sure your health care provider checks your blood pressure at every visit.  KEEP YOUR CHOLESTEROL UNDER CONTROL  Normal cholesterol levels will help prevent heart disease and stroke. These are the biggest health problems for people with diabetes. Keeping cholesterol levels under control can also help with blood flow. Have your cholesterol level checked at least once a year. Your health care provider may prescribe a medicine known as a statin. Statins lower your cholesterol. If you are not taking a statin, ask your health care provider if you should be. Meal planning, exercise, and medicines can help you reach your cholesterol targets.   SCHEDULE AND KEEP YOUR ANNUAL PHYSICAL EXAMS AND EYE EXAMS  Your health care provider will tell you how often he or she wants to see you depending on your plan of treatment. It is important that you keep these appointments so that possible problems can be identified early and  complications can be avoided or treated.  · Every visit with your health care provider should include your weight, blood pressure, and an evaluation of your blood glucose control.  · Your hemoglobin A1c should be checked:  ¨ At least twice a year if you are at your goal.  ¨ Every 3 months if there are changes in treatment.  ¨ If you are not meeting your goals.  · Your blood lipids should be checked yearly. You should also be checked yearly to see if you have protein in your urine (microalbumin).  · Schedule a dilated eye exam within 5 years of your diagnosis if you have type 1 diabetes, and then yearly. Schedule a dilated eye exam at diagnosis if you have type 2 diabetes, and then yearly. All exams thereafter can be extended to every 2 to 3 years if one or more exams have been normal.  KEEP YOUR VACCINES CURRENT  It is recommended that you receive a flu (influenza) vaccine every year. It is also recommended that you receive a pneumonia (pneumococcal) vaccine. If you are 65 years of age or older and have never received a pneumonia vaccine, this vaccine may be given as a series of two separate shots. Ask your health care provider which additional vaccines may be recommended.  TAKE CARE OF YOUR FEET   Diabetes may cause you to have a poor blood supply (circulation) to your legs and feet. Because of this, the skin may be thinner, break easier, and heal more slowly. You also may have nerve damage in your legs and feet, causing decreased feeling. You may not notice minor injuries to your feet that could lead to serious problems or infections. Taking care of your feet is very important.  Visual foot exams are performed at every routine medical visit. The exams check for cuts, injuries, or other problems with the feet. A comprehensive foot exam should be done yearly. This includes visual inspection as well as assessing foot pulses and testing for loss of sensation. You should also do the following:  · Inspect your feet  daily for cuts, calluses, blisters, ingrown toenails, and signs of infection, such as redness, swelling, or pus.  · Wash and dry your feet thoroughly, especially between the toes.  · Avoid soaking your feet regularly in hot water baths.  · Moisturize dry skin with lotion, avoiding areas between your toes.  · Cut toenails straight across and file the edges.  · Avoid shoes that do not fit well or have areas that irritate your skin.  · Avoid going barefooted or wearing only socks. Your feet need protection.  TAKE CARE OF YOUR TEETH  People with poorly controlled diabetes are more likely to have gum (periodontal) disease. These infections make diabetes harder to control. Periodontal diseases, if left untreated, can lead to tooth loss. Brush your teeth twice a day, floss, and see your dentist for checkups and cleaning every 6 months, or 2 times a year.  ASK YOUR HEALTH CARE PROVIDER ABOUT TAKING ASPIRIN  Taking aspirin daily is recommended to help prevent cardiovascular disease in people with and without diabetes. Ask your health care provider if this would benefit you and what dose he or she would recommend.  DRINK RESPONSIBLY  Moderate amounts of alcohol (less than 1 drink per day for adult women and less than 2 drinks per day for adult men) have a minimal effect on blood glucose if ingested with food. It is important to eat food with alcohol to avoid hypoglycemia. People should avoid alcohol if they have a history of alcohol abuse or dependence, if they are pregnant, and if they have liver disease, pancreatitis, advanced neuropathy, or severe hypertriglyceridemia.  LESSEN STRESS  Living with diabetes can be stressful. When you are under stress, your blood glucose may be affected in two ways:  · Stress hormones may cause your blood glucose to rise.  · You may be distracted from taking good care of yourself.  It is a good idea to be aware of your stress level and make changes that are necessary to help you better manage  challenging situations. Support groups, planned relaxation, a hobby you enjoy, meditation, healthy relationships, and exercise all work to lower your stress level. If your efforts do not seem to be helping, get help from your health care provider or a trained mental health professional.     This information is not intended to replace advice given to you by your health care provider. Make sure you discuss any questions you have with your health care provider.     Document Released: 09/04/2012 Document Revised: 01/08/2016 Document Reviewed: 02/11/2015  [employer] Interactive Patient Education ©2016 [employer]

## 2018-02-26 NOTE — DISCHARGE PLANNING
PO abx causing n/v which requires IV zofran. Unable to discharge at this time. CTT will continue to follow.

## 2018-02-26 NOTE — PROGRESS NOTES
Infectious Disease Progress Note    Author: Uzma Bazan M.D. Date & Time of service: 2018  11:21 AM    Chief Complaint:  FU right foot abscess    Interval History:   AF, WBc 9.9, had reaction to unasyn earlier today with rash, itching and tongue swelling, now on vancomycin, just returned from I&D, pain controlled, never had cephalosporins per pt   AF, no CBC, had rash, itching and tongue swelling with vanco, abx changed to linezolid which he is tolerating, also tolerating aztreonam   AF, WBC 8.9, pain controlled, tolerating cipro without issues, plan for surgical dressing removal today per pt   AF, no CBC, has pain, wound surgical site with edema and erythema   AF, Right , having nausea with abx but antiemetics helping, denies vomiting   AF, sharp pain in his R foot overnight, still with edema and erythema   Tmax 99.3, no CBC, having nausea with linezolid, surgical site with decreased drainage and erythema today on exam  2018-Tmax 97.8. New issues overnight.  Labs Reviewed, Medications Reviewed, Radiology Reviewed and Wound Reviewed.    Review of Systems:  Review of Systems   Constitutional: Negative for chills and fever.   Respiratory: Negative for shortness of breath.    Gastrointestinal: Negative for abdominal pain, diarrhea, nausea and vomiting.   Musculoskeletal: Positive for joint pain and myalgias.   Skin: Negative for itching and rash.       Hemodynamics:  Temp (24hrs), Av.3 °C (97.3 °F), Min:36 °C (96.8 °F), Max:36.6 °C (97.8 °F)  Temperature: 36.4 °C (97.6 °F)  Pulse  Av.8  Min: 50  Max: 99   Blood Pressure: (!) 176/94       Physical Exam:  Physical Exam   Constitutional: He is oriented to person, place, and time. He appears well-developed.   HENT:   Head: Normocephalic and atraumatic.   Eyes: EOM are normal. Pupils are equal, round, and reactive to light.   Neck: Neck supple.   Cardiovascular: Normal rate.    IRR   Pulmonary/Chest: Effort  normal and breath sounds normal.   Abdominal: Soft. There is no tenderness.   Musculoskeletal:   R foot surgical site with sutures in place. No drainage +edema and erythema - decreased   Neurological: He is alert and oriented to person, place, and time.       Meds:    Current Facility-Administered Medications:   •  sulfamethoxazole-trimethoprim  •  rivaroxaban  •  amLODIPine  •  labetalol  •  insulin regular **AND** [CANCELED] Accu-Chek Q6 if NPO **AND** NOTIFY MD and PharmD **AND** glucose 4 g **AND** dextrose 50%  •  ondansetron  •  ondansetron  •  prochlorperazine  •  prochlorperazine  •  pravastatin  •  metoprolol  •  lisinopril  •  digoxin  •  aspirin  •  senna-docusate **AND** polyethylene glycol/lytes **AND** magnesium hydroxide **AND** bisacodyl  •  NS  •  acetaminophen  •  Notify provider if pain remains uncontrolled **AND** Use the numeric rating scale (NRS-11) on regular floors and Critical-Care Pain Observation Tool (CPOT) on ICUs/Trauma to assess pain **AND** Pulse Ox (Oximetry) **AND** [DISCONTINUED] Pharmacy Consult Request **AND** If patient difficult to arouse and/or has respiratory depression, stop any opiates that are currently infusing and call a Rapid Response. **AND** oxyCODONE immediate-release **AND** oxyCODONE immediate-release **AND** morphine injection  •  famotidine  •  diphenhydrAMINE    Labs:  No results for input(s): WBC, RBC, HEMOGLOBIN, HEMATOCRIT, MCV, MCH, RDW, PLATELETCT, MPV, NEUTSPOLYS, LYMPHOCYTES, MONOCYTES, EOSINOPHILS, BASOPHILS, RBCMORPHOLO in the last 72 hours.  Recent Labs      02/25/18   1511   SODIUM  136   POTASSIUM  4.0   CHLORIDE  102   CO2  25   GLUCOSE  160*   BUN  15     Recent Labs      02/25/18   1511   CREATININE  1.12       Imaging:  Dx-chest-for Picc Line Perform Procedure In: Patient's Room    Result Date: 1/20/2018 1/20/2018 11:02 PM HISTORY/REASON FOR EXAM:  PICC line placement. TECHNIQUE/EXAM DESCRIPTION AND NUMBER OF VIEWS: Single view of the chest.  COMPARISON: 11/20/2017 FINDINGS: Cardiac mediastinal contour is unchanged. Lungs show hypoinflation. Pulmonary vessels are again mildly prominent. No pleural fluid collection or pneumothorax. LEFT arm PICC line in place with tip at the mid SVC. No major bony abnormality is seen.     1.  Supportive tubing as described above. 2.  No pneumonia or pneumothorax. 3.  Pulmonary vasculature is again mildly prominent.    Dx-chest-limited (1 View)    Result Date: 2/1/2018 2/1/2018 1:00 PM HISTORY/REASON FOR EXAM:  Chest Pain Right chest pain TECHNIQUE/EXAM DESCRIPTION AND NUMBER OF VIEWS: Single portable view of the chest. COMPARISON: 1/26/2018 FINDINGS: No pulmonary infiltrates or consolidations are noted. No pleural effusion. No pneumothorax. Stable enlarged cardiopericardial silhouette.     Stable cardiomegaly No acute cardiopulmonary abnormalities are identified.    Dx-chest-limited (1 View)    Addendum Date: 1/26/2018    Addendum: Left PICC line is seen terminating overlying the expected location of the proximal superior vena cava    Result Date: 1/26/2018 1/26/2018 3:16 AM HISTORY/REASON FOR EXAM:  PICC line placement TECHNIQUE/EXAM DESCRIPTION:  Single AP view of the chest. COMPARISON: January 20, 2018 FINDINGS: Overlying cardiac leads are present. The heart is in the upper limits of normal for size. The mediastinal contour appears within normal limits.  The central pulmonary vasculature appears normal. The lungs appear well expanded bilaterally.  Bilateral lungs are clear. No significant pleural effusions are identified. The bony structures appear age-appropriate.     1.  No acute cardiopulmonary disease.    Dx-chest-portable (1 View)    Result Date: 2/8/2018 2/8/2018 2:57 PM HISTORY/REASON FOR EXAM:  Chest Pain Mid sternal chest pain. TECHNIQUE/EXAM DESCRIPTION AND NUMBER OF VIEWS: Single portable view of the chest. COMPARISON: 2/1/18 FINDINGS: No pulmonary infiltrates or consolidations are noted. No pleural  effusion. No pneumothorax. Stable enlarged cardiopericardial silhouette.     1. Stable cardiomegaly No pulmonary infiltrates or consolidations are noted.    Dx-foot-complete 3+ Right    Result Date: 2/17/2018 2/17/2018 11:04 PM HISTORY/REASON FOR EXAM:  Right foot infection, open wound TECHNIQUE/EXAM DESCRIPTION AND NUMBER OF VIEWS: 3 views of the RIGHT foot. COMPARISON:  Right foot x-ray 2/2/2018 FINDINGS: There has been first transmetatarsal amputation. The stump appears within normal limits. There is adjacent soft tissue swelling. 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 There are subluxations at multiple metatarsophalangeal joint. There is spurring at the insertion of achilles' tendon and origin of plantar fascia.     1.  Prior first transmetatarsal amputation 2.  Midfoot osteoarthritis 3.  calcaneal spurring    Dx-foot-complete 3+ Right    Result Date: 2/2/2018 2/2/2018 4:40 AM HISTORY/REASON FOR EXAM:  Right foot pain.. TECHNIQUE/EXAM DESCRIPTION AND NUMBER OF VIEWS:  3 views of the RIGHT foot. COMPARISON: 1/15/2018 FINDINGS: There is evidence of amputation through the proximal shaft of the first metatarsal, which has occurred since the prior images. There is generalized soft tissue swelling about the distal foot. No soft tissue gas however. No acute bony abnormality.     No acute bony abnormality.    Mr-foot-with & W/o Right    Result Date: 2/19/2018 2/18/2018 4:39 PM HISTORY/REASON FOR EXAM:  Open wound. Rt Foot pain, Open Wound Mid Foot TECHNIQUE/EXAM DESCRIPTION: MRI of the RIGHT foot with and without contrast. The study was performed on a Nighat 3.0 Taya MRI scanner. T1 axial, T1 sagittal, T1 coronal, STIR sagittal, T2 fast spin-echo fat-suppressed coronal, sagittal inversion recovery, and T1 postcontrast coronal images were obtained. 20 mL Omniscan contrast was administered intravenously. COMPARISON: 1/16/2018. FINDINGS: Interval further amputation of the first ray with small residual first  metatarsal stump. There is a deep soft tissue ulcer at the amputation bed with extensive phlegmonous change. There are susceptibility artifact foci within the surgical bed which could relate to gas. There is tenosynovitis of the distal extensor hallucis longus underneath the ulcer. There is bone marrow replacement in the first metatarsal stump, consistent with osteomyelitis.     MR evidence of osteomyelitis within the first metatarsal stump. Deep soft tissue ulcer at the amputation bed with extensive phlegmonous change and possible gas. No discrete fluid collection seen. There is tenosynovitis of the distal extensor hallucis longus underneath the ulcer.     Echocardiogram-comp W/ Cont    Result Date: 2018  Transthoracic Echo Report Echocardiography Laboratory CONCLUSIONS Technically difficult but adequate study for interpretation. Contrast was used to enhance visualization of the endocardial border. Normal left ventricular chamber size. Normal left ventricular systolic function. Left ventricular ejection fraction is visually estimated to be 60%. Mild concentric left ventricular hypertrophy. Trace mitral regurgitation. Normal left atrial size. Structurally normal aortic valve without significant stenosis or regurgitation. Mild tricuspid regurgitation. Estimated right ventricular systolic pressure  is 35 mmHg. Normal inferior vena cava size and inspiratory collapse. Normal right ventricular size and systolic function. EMELY FLORES Exam Date:         2018                    08:43 Exam Location:     Inpatient Priority:          Routine Ordering Physician:        FABIAN RAMIRES Referring Physician: Sonographer:               Sherry Funes RDCS Age:    52     Gender:    M MRN:    [id number] :    1965 BSA:    2.51   Ht (in):    72     Wt (lb):    295 Exam Type:     Complete, Contrast Indications:     Paroxysmal atrial fibrillation ICD Codes:       I480 CPT Codes:       06244,  BP:   145    /    101    HR:   110 Technical Quality:       Technically difficult study -                          adequate information is obtained MEASUREMENTS  (Male / Female) Normal Values 2D ECHO LV Diastolic Diameter PLAX        4.4 cm                4.2 - 5.9 / 3.9 - 5.3 cm LV Systolic Diameter PLAX         2.5 cm                2.1 - 4.0 cm IVS Diastolic Thickness           1.5 cm                LVPW Diastolic Thickness          1.1 cm                RV Diameter 4C                    3.2 cm                2.5 - 2.9 cm LVOT Diameter                     2.1 cm                RA Diameter                       3.6 cm                Estimated LV Ejection Fraction    60 %                  LV Ejection Fraction MOD BP       54.7 %                >= 55  % LV Ejection Fraction MOD 4C       64.7 %                LV Ejection Fraction MOD 2C       55.5 %                LA Volume Index                   28.8 cm³/m²           16 - 28 cm³/m² IVC Diameter                      1.5 cm                M-MODE Aortic Root Diameter MM           3.9 cm                DOPPLER AV Peak Velocity                  1.3 m/s               AV Peak Gradient                  7 mmHg                AV Mean Gradient                  3.5 mmHg              LVOT Peak Velocity                1.2 m/s               AV Area Cont Eq vti               3 cm²                 TR Peak Velocity                  239 cm/s              PV Peak Velocity                  0.91 m/s              PV Peak Gradient                  3.3 mmHg              RVOT Peak Velocity                0.69 m/s              * Indicates values subject to auto-interpretation LV EF:  60    % FINDINGS Left Ventricle 3 mL of contrast was administered. Contrast was used to enhance visualization of the endocardial border. Existing IV was used. Normal left ventricular chamber size. Mild concentric left ventricular hypertrophy. Normal left ventricular systolic function. Left ventricular ejection fraction is  visually estimated to be 60%. Diastolic function is difficult to assess with atrial fibrillation. Right Ventricle Normal right ventricular size and systolic function. Right Atrium Normal right atrial size. Normal inferior vena cava size and inspiratory collapse. Left Atrium Normal left atrial size. Mitral Valve Thickened mitral valve leaflets. Trace mitral regurgitation. Aortic Valve Structurally normal aortic valve without significant stenosis or regurgitation. Tricuspid Valve Mild tricuspid regurgitation. Estimated right ventricular systolic pressure  is 35 mmHg. Pulmonic Valve Structurally normal pulmonic valve without significant stenosis or regurgitation. Pericardium Normal pericardium without effusion. Aorta Aortic root dilatation measuring 3.9 cm. Rocio Ross M.D. (Electronically Signed) Final Date:     2018                 14:23    Le Venous Duplex (specify In Comments Left, Right Or Bilateral)    Result Date: 2018   Vascular Laboratory  CONCLUSIONS  Normal bilateral superficial and deep venous examination of the right leg.  EMELY FLORES  Exam Date:     2018 04:36  Room #:     Inpatient  Priority:     Routine  Ht (in):             Wt (lb):  Ordering Physician:        SHIRLEY WILLIAMSON  Referring Physician:       [id number]  Sonographer:               YOKASTA troncoso rvt  Study Type:  Technical Quality:         Adequate  Age:    52    Gender:     M  MRN:    [id number]  :    1965      BSA:  Indications:     RT LEG SWELLING  CPT Codes:  ICD Codes:  History:         Swelling of the rt limb  Limitations:     body habitus in calf  PROCEDURES:  rt lower ext venous doppler  FINDINGS:  Complete color filling and compressibility with normal venous flow dynamics  including spontaneous flow, response to augmentation maneuvers, and  respiratory phasicity.  The peroneal and posterior tibial veins are difficult to assess for  compressibility, but flow response to augmentation is  demonstrated.  GSV DST THIGH Densely echogenic material is within  the vein that is  consistent with chronic venous thrombosis. FLOW IS SEEN  ___vein is partially compressible.  AREA OF PAIN - edema obscures the visualization of the  lat/post calf  Germain Craven  (Electronically Signed)  Final Date:      2018                   07:11    Nm-cardiac Stress Test    Addendum Date: 2018    Baseline ECG:atrial fibrillation Stress ECG: No ischemic T wave changes with peak stress Impression: Normal stress ECG, see nuclear images     Result Date: 2018   Myocardial Perfusion  Report  NUCLEAR IMAGING INTERPRETATION  No evidence of significant jeopardized viable myocardium or prior myocardial  infarction.  Normal left ventricular size, ejection fraction, and wall motion.  EMELY FLORES  MRN:    [id number]         Gender:    M  Exam Date: 2018 06:35  Exam Location:      Inpatient  Ordering Phys:     ALETHEA MASSEY  NucMed Tech:       Clarice Escoto, RT  Age:    52    :    1965        Ht (in):     72  Wt (lb):     298    BMI:    40.37       Radiologist  Risk Factors:             Family history of coronary disease, Diabetes,                            Hypertension  Indications:              Shortness of breath, Chest pain, unspecified  ICD Codes:                  R079  Cardiac History:          None reported  Cardiac Meds:  Meds Past 24 hrs:  Pretest Chest Pain:  STRESS TEST      Pharmacologi                   uche Agrawal   Lexiscan       Dose: 0.4 mg  ol:  Post-Injection Exercise:  Resting HR (bpm):      95  Peak HR (bpm):         109  Resting BP (mmHg):       122    /   77  Peak BP (mmHg):       131   /   92  MaxPHR:     166     Target HR (bpm):       141  % MaxPHR:     66  Double Product:       45426  BP Response:  Stress Termination:       Completed Protocol  Stress Symptoms:  Arrhythmia: None Chest Pain: None Dyspnea Flushing, Headache, Somach Cramps,  Leg Fatigue   ECG  Resting ECG:  Stress ECG:  IMAGE PROTOCOL      Rest/Stress 1                      Day          RadiopharmaceuticalDose (mCi)   Imaging  Date      Imaging  Time         Inj to Img Time (min)  Rest:   Tc-99m             7.4          29-Jan-2018        13:36          Tetrofosmin  Stress: Tc-99m             25.0         29-Jan-2018        14:27          Tetrofosmin  Rest:  Administration Site:       Left forearm  Administered by:      RT Rasheeda  Stress:  Administration Site:       Left forearm  Administered by:      Graeme Joaquin RN  % Percent HR Achieved:  SPECT RESULTS  Technical Quality:       Good  Raw Data Analysis:  Summed Stress Score:    2  Summed Rest Score:    2  Summed Difference Score:        2  PERFUSION:  Normal myocardial perfusion with no ischemia.  FUNCTIONAL RESULTS (calculated via Gated SPECT)  Stress Image LV EF:        64     %  Upper Normal Limit  Stress EDV:      92     ml   EDVI:    36      ml/m²  Stress ESV:      33     ml   ESVI:    13      ml/m²  TID:    0.96   TID - 1.19      TID (ed) - 1.23  LV Function:  Normal left ventricular wall motion.  LV ejection fraction = 64%.  Shmuel Rai  (Electronically Signed)  Final Date:      29 January 2018                   15:38    Ct-cta Chest Pulmonary Artery W/ Recons    Result Date: 2/2/2018 2/2/2018 4:04 AM HISTORY/REASON FOR EXAM:  Pain TECHNIQUE/EXAM DESCRIPTION: CT angiogram scan for pulmonary embolism with contrast, with reconstructions. 1.25 mm helical sections were obtained from the lung apices through the lung bases following the rapid bolus administration of 90 mL of Omnipaque 350 nonionic contrast. Thin-section overlapping reconstruction interval was utilized. Coronal reconstructions were generated from the axial data. MIP post processing was performed and utilized for the interpretation. Low dose optimization technique was utilized for this CT exam including automated exposure control and adjustment of the mA and/or  "kV according to patient size. COMPARISON: None FINDINGS: Pulmonary Embolism: No. Lungs: No suspicious nodules or air space process. Pleura: No pleural effusion. Nodes: No enlarged lymph nodes. Additional findings: There are multiple focal gas lucencies within the gallbladder, consistent with known opaque gallstones. Localized increased opacity is seen in the retroareolar regions of both breasts, consistent with gynecomastia..     1.  No evidence of pulmonary emboli or other acute intrathoracic abnormality. 2.  Cholelithiasis. 3.  Gynecomastia.       Micro:  Results     Procedure Component Value Units Date/Time    BLOOD CULTURE [[id number]] Collected:  02/17/18 2245    Order Status:  Completed Specimen:  Blood from Peripheral Updated:  02/23/18 0100     Significant Indicator NEG     Source BLD     Site PERIPHERAL     Blood Culture No growth after 5 days of incubation.    Narrative:       Per Hospital Policy: Only change Specimen Src: to \"Line\" if  specified by physician order.    BLOOD CULTURE [[id number]] Collected:  02/17/18 2316    Order Status:  Completed Specimen:  Blood from Peripheral Updated:  02/23/18 0100     Significant Indicator NEG     Source BLD     Site PERIPHERAL     Blood Culture No growth after 5 days of incubation.    Narrative:       Per Hospital Policy: Only change Specimen Src: to \"Line\" if  specified by physician order.    AFB CULTURE [[id number]] Collected:  02/19/18 1223    Order Status:  Completed Specimen:  Tissue Updated:  02/22/18 1452     Significant Indicator NEG     Source TISS     Site Right Foot     AFB Culture Culture in progress.     AFB Smear Results No acid fast bacilli seen.    CULTURE TISSUE W/ GRM STAIN [[id number]]  (Abnormal) Collected:  02/19/18 1223    Order Status:  Completed Specimen:  Tissue Updated:  02/22/18 1452     Gram Stain Result Few Gram positive cocci. (A)     Significant Indicator POS (POS)     Source TISS     Site Right Foot     Tissue Culture -- (A)     Tissue " Culture -- (A)     Methicillin Resistant Staphylococcus aureus  Moderate growth  See previous culture for sensitivity report.      ANAEROBIC CULTURE [[id number]] Collected:  02/19/18 1223    Order Status:  Completed Specimen:  Tissue Updated:  02/22/18 1452     Significant Indicator NEG     Source TISS     Site Right Foot     Anaerobic Culture, Culture Res No Anaerobes isolated.    FUNGAL CULTURE [[id number]] Collected:  02/19/18 1223    Order Status:  Completed Specimen:  Tissue Updated:  02/22/18 1452     Significant Indicator NEG     Source TISS     Site Right Foot     Fungal Culture Culture in progress.    GRAM STAIN [[id number]] Collected:  02/19/18 1223    Order Status:  Completed Specimen:  Tissue Updated:  02/20/18 2107     Significant Indicator .     Source TISS     Site Right Foot     Gram Stain Result Few Gram positive cocci.    ACID FAST STAIN [[id number]] Collected:  02/19/18 1223    Order Status:  Completed Specimen:  Tissue Updated:  02/20/18 2107     Significant Indicator NEG     Source TISS     Site Right Foot     AFB Smear Results No acid fast bacilli seen.    CULTURE WOUND W/ GRAM STAIN [[id number]]  (Abnormal)  (Susceptibility) Collected:  02/17/18 2236    Order Status:  Completed Specimen:  Wound from Right Foot Updated:  02/20/18 0925     Gram Stain Result --     Few WBCs.  Moderate Gram positive cocci.       Significant Indicator POS (POS)     Source WND     Site RIGHT FOOT     Culture Result Wound -- (A)     Culture Result Wound -- (A)     Methicillin Resistant Staphylococcus aureus  Heavy growth  This isolate is presumed to be clindamycin resistant based on  detection of inducible resistance.  Clindamycin may still  be effective in some patients.       Culture Result Wound -- (A)     Proteus mirabilis  Light growth      Culture & Susceptibility     METHICILLIN RESISTANT STAPHYLOCOCCUS AUREUS     Antibiotic Sensitivity Microscan Unit Status    Ampicillin/sulbactam Resistant 16/8 mcg/mL Final     Clindamycin Resistant 1 mcg/mL Final    Daptomycin Sensitive 1 mcg/mL Final    Erythromycin Resistant >4 mcg/mL Final    Moxifloxacin Resistant >4 mcg/mL Final    Oxacillin Resistant >2 mcg/mL Final    Penicillin Resistant >8 mcg/mL Final    Tetracycline Sensitive <=4 mcg/mL Final    Trimeth/Sulfa Sensitive <=0.5/9.5 mcg/mL Final    Vancomycin Sensitive 2 mcg/mL Final              PROTEUS MIRABILIS     Antibiotic Sensitivity Microscan Unit Status    Ampicillin Sensitive <=8 mcg/mL Final    Cefepime Sensitive <=8 mcg/mL Final    Cefotaxime Sensitive <=2 mcg/mL Final    Cefotetan Sensitive <=16 mcg/mL Final    Ceftazidime Sensitive <=1 mcg/mL Final    Ceftriaxone Sensitive <=8 mcg/mL Final    Cefuroxime Sensitive <=4 mcg/mL Final    Ciprofloxacin Sensitive <=1 mcg/mL Final    Ertapenem Sensitive <=1 mcg/mL Final    Gentamicin Sensitive <=4 mcg/mL Final    Pip/Tazobactam Sensitive <=16 mcg/mL Final    Tobramycin Sensitive <=4 mcg/mL Final    Trimeth/Sulfa Sensitive <=2/38 mcg/mL Final                             Assessment:  Active Hospital Problems    Diagnosis   • *Diabetic foot infection (CMS-HCC) [E11.69, L08.9]   • Type 2 diabetes mellitus (CMS-HCC) [E11.9]   • HLD (hyperlipidemia) [E78.5]   • HTN (hypertension) [I10]   • Atrial fibrillation (CMS-HCC) [I48.91]       Plan:  Right foot abscess, slowly improving  Afebrile  Leukocytosis resolved  S/p I&D on 2/19. Multiple loculations were noted intraop  OR gram stain +GPC, cx MRSA  Wound cx - MRSA (vanco ISAMAR 2) and proteus  Prior cx +MRSA, efaecalis  DC cipro  DC linezolid due to nausea  Change to bactrim (covers both pathogens)  Wound care   Can be discharged on oral Bactrim for 2 weeks but likely may have to be extended  Monitor labs while on Bactrim    DM2  hgA1c 6.8  Maintain BS less than 150 to control infection    If improving tmrw, ok to d/c home on bactrim if tolerating.    CONSUELO Mixon

## 2018-02-26 NOTE — DISCHARGE SUMMARY
Hospital Medicine Discharge Note     Admit Date:  2/17/2018       Discharge Date:   2/26/2018    Attending Physician:  Aisha Mixon     Diagnoses (includes active and resolved):       Diabetic foot infection (CMS-[facility]) POA: Yes    Type 2 diabetes mellitus (CMS-[facility]) POA: Yes    HLD (hyperlipidemia) POA: Yes    Atrial fibrillation (CMS-[facility]) POA: Yes    HTN (hypertension) POA: Yes      Chief Complaint   Patient presents with   • Wound Check     Hx of diabetic ulcer to right foot with amputation in November 2017, right found red, open wound to amputation site       Hosiery of Present Illness  Patient is a 53-year-old gentleman with diabetes admitted for unhealed diabetic ulcer on the right foot.   Detailed info pls see H&P.    Hospital Summary (Brief Narrative):       53 y.o. male who presented on 2/17/2018 with right foot pain and swelling at the surgical site. Patient was noticed to have multiple loculated abscess on the right foot. Orthopedics was consulted and recommended patient and the per patient received irrigation and debridement for his multiple loculated abscess on the right throat. Patient tolerated procedure very well. Patient was then seen by infectious disease specialist and recommended patient to be treated with oral linezolid and Cipro. Patient's culture grows OR gram stain +GPC, cx MRSA, but his Wound cx - MRSA (vanco ISAMAR 2) and proteus. Patient was recommended to be treated by oral antibiotics for 2 weeks. Patient does have reaction to vancomycin so he was treated with linezolid and by mouth Cipro. Patient developed nausea vomiting with linezolid and Cipro. Patient's antibiotics was then changed to Bactrim. Patient tolerated very well. Anticipated total course of oral antibiotics at least 2 weeks.. Patient evaluated by PT OT and will receive home health at home.    Consultants:      Ortho  ID    Procedures:        Irrigation and debridement, multiloculated abscess, right foot.    Discharge Medications:         (x)  Medication Reconciliation Completed       Medication List      START taking these medications      Instructions   acetaminophen 325 MG Tabs  Commonly known as:  TYLENOL   Take 2 Tabs by mouth every 6 hours as needed (Mild Pain; (Pain scale 1-3); Temp greater than 100.5 F).  Dose:  650 mg     sulfamethoxazole-trimethoprim 800-160 MG tablet  Commonly known as:  BACTRIM DS   Take 1 Tab by mouth every 12 hours for 14 days.  Dose:  1 Tab        CONTINUE taking these medications      Instructions   amLODIPine 5 MG Tabs  Commonly known as:  NORVASC   Take 1 Tab by mouth every day.  Dose:  5 mg     aspirin 81 MG EC tablet   Take 1 Tab by mouth every day.  Dose:  81 mg     digoxin 250 MCG Tabs  Commonly known as:  LANOXIN   Take 250 mcg by mouth every day.  Dose:  250 mcg     famotidine 20 MG Tabs  Commonly known as:  PEPCID   Take 20 mg by mouth 2 times a day.  Dose:  20 mg     lisinopril 40 MG tablet  Commonly known as:  PRINIVIL, ZESTRIL   Take 1 Tab by mouth every day.  Dose:  40 mg     metFORMIN 500 MG Tabs  Commonly known as:  GLUCOPHAGE   Take 2 Tabs by mouth 2 times a day, with meals.  Dose:  1000 mg     Metoprolol Tartrate 75 MG Tabs   Take 75 mg by mouth 2 Times a Day.  Dose:  75 mg     pravastatin 20 MG Tabs  Commonly known as:  PRAVACHOL   Take 1 Tab by mouth every evening.  Dose:  20 mg     rivaroxaban 20 MG Tabs tablet  Commonly known as:  XARELTO   Take 1 Tab by mouth with dinner.  Dose:  20 mg        STOP taking these medications    linezolid 600 MG Tabs  Commonly known as:  ZYVOX            Disposition:   Discharge home    Diet:   Diabetic    Activity:   As tolerated    Code status:   Full code    Primary Care Provider:    Jerad Lomax M.D.    Follow up appointment details :      PCP in 2 weeks  No follow-up provider specified.  Future Appointments  Date Time Provider Department Center   3/1/2018 9:30 AM Jerad Lomax M.D. [facility]   3/7/2018 8:30 AM Rodolfo Sanchez M.D. [facility] None        Pending Studies:        None    Time spent on discharge day patient visit: >35 minutes    #################################################    Interval history/exam for day of discharge:    Vitals:    02/25/18 1530 02/25/18 2000 02/26/18 0400 02/26/18 0818   BP: 153/82 143/64 159/88 (!) 176/94   Pulse: 71 73 71 68   Resp: 16 16 16 18   Temp: 36 °C (96.8 °F) 36.1 °C (97 °F) 36.6 °C (97.8 °F) 36.4 °C (97.6 °F)   SpO2: 94% 91% 97% 95%   Weight:       Height:         Weight/BMI: Body mass index is 39.86 kg/m².  Pulse Oximetry: 95 %, O2 (LPM): 0, O2 Delivery: None (Room Air)    Gen: AAOx3, NAD  Eyes: PELLA  Neck: no JVD, no lymphadenopathy  Cardia: RRR, no mrg  Lungs: CTAB, no rales, rhonci or wheezing  Abd: NABS, soft, non extended, no mass  EXT: No C/C/E, peripheral pulse 2+ b/l, wound healing properly  Neuro: CN II-XII intact, non focal, reflex 2+ symmetrical  Skin: Intact, no lesion, warm  Psych: Appropriate.    Most Recent Labs:    Lab Results   Component Value Date/Time    WBC 8.9 02/21/2018 05:23 AM    RBC 4.16 (L) 02/21/2018 05:23 AM    HEMOGLOBIN 11.9 (L) 02/21/2018 05:23 AM    HEMATOCRIT 36.4 (L) 02/21/2018 05:23 AM    MCV 87.5 02/21/2018 05:23 AM    MCH 28.6 02/21/2018 05:23 AM    MCHC 32.7 (L) 02/21/2018 05:23 AM    MPV 10.4 02/21/2018 05:23 AM    NEUTSPOLYS 55.60 02/21/2018 05:23 AM    LYMPHOCYTES 24.20 02/21/2018 05:23 AM    MONOCYTES 7.70 02/21/2018 05:23 AM    EOSINOPHILS 11.90 (H) 02/21/2018 05:23 AM    BASOPHILS 0.30 02/21/2018 05:23 AM      Lab Results   Component Value Date/Time    SODIUM 136 02/25/2018 03:11 PM    POTASSIUM 4.0 02/25/2018 03:11 PM    CHLORIDE 102 02/25/2018 03:11 PM    CO2 25 02/25/2018 03:11 PM    GLUCOSE 160 (H) 02/25/2018 03:11 PM    BUN 15 02/25/2018 03:11 PM    CREATININE 1.12 02/25/2018 03:11 PM      Lab Results   Component Value Date/Time    ALTSGPT 11 02/09/2018 12:37 AM    ASTSGOT 14 02/09/2018 12:37 AM    ALKPHOSPHAT 53 02/09/2018 12:37 AM    TBILIRUBIN 0.7 02/09/2018  12:37 AM    LIPASE 10 (L) 02/08/2018 02:00 PM    ALBUMIN 3.2 02/09/2018 12:37 AM    GLOBULIN 2.9 02/09/2018 12:37 AM    PREALBUMIN 12.0 (L) 01/15/2018 11:56 AM    INR 2.29 (H) 02/08/2018 02:00 PM     Lab Results   Component Value Date/Time    PROTHROMBTM 24.9 (H) 02/08/2018 02:00 PM    INR 2.29 (H) 02/08/2018 02:00 PM        Imaging/ Testing:      MR-FOOT-WITH & W/O RIGHT   Final Result            MR evidence of osteomyelitis within the first metatarsal stump.      Deep soft tissue ulcer at the amputation bed with extensive phlegmonous change and possible gas. No discrete fluid collection seen. There is tenosynovitis of the distal extensor hallucis longus underneath the ulcer.         DX-FOOT-COMPLETE 3+ RIGHT   Final Result      1.  Prior first transmetatarsal amputation      2.  Midfoot osteoarthritis      3.  calcaneal spurring          Instructions:      The patient was instructed to return to the ER in the event of worsening symptoms. I have counseled the patient on the importance of compliance and the patient has agreed to proceed with all medical recommendations and follow up plan indicated above.   The patient understands that all medications come with benefits and risks. Risks may include permanent injury or death and these risks can be minimized with close reassessment and monitoring.        This dictation was created using voice recognition software. The accuracy of the dictation is limited to the abilities of the software. Although every efforts have been used to decrease the error, I expect there may be some errors of grammar and possibly content.

## 2018-02-26 NOTE — DISCHARGE PLANNING
Per EMA Guo request, transport has been arranged for patient to transfer to his home today at 1430 via [facility] transport.  Spoke with Ngozi graves [facility] has requested patient meet the  in the ER Lobby.  CCT Sari advised of transport time.

## 2018-02-26 NOTE — PROGRESS NOTES
LIMB PRESERVATION SERVICE     Pt well known to [facility].   54 y/o male with DM, obesity, HTN, and peripheral neuropathy. Admitted for new ulcer to R medial foot incision. S/p R 1st ray amputation on 1/17/18 by Dr. Delong  Also presents with DTI x2 to R lateral foot, POA and L great toe ulcer which has resolved since last admission.       s/p I and D, multiloculated abscess of R foot by Dr. Lopez on 2/19  BP (!) 176/94   Pulse 68   Temp 36.4 °C (97.6 °F)   Resp 18   Ht 1.829 m (6')   Wt (!) 133.3 kg (293 lb 14 oz)   SpO2 95%   BMI 39.86 kg/m²   No c/o n/v. abx transitioned to bactrim.   Pain to foot has decreased    R medial foot incision:  Approximated with sutures   Scant s/s drainage on drsg only. drainage has decreased  Skin dry  Decreased edema and erythema, foot warm around incision  Incisional necrosis noted proximal and mid incision.  100% red tissue to open areas    R lateral foot DTI, POA x 2  Black eschar in place, stable dry intact.      leander and offloading boot at bedside    ID involved         PLAN  drsg orders placed for nursing, aquacel ag, foam, kerlix, ace wrap  Continue abx per ID  Offloading boot when OOB RLE HWB      D/C plan: home with HH. Ok to D/C  F/U with [facility] rounds on 3/2/18 at [facility]      D/W: pt, Dr. Bazan

## 2018-02-26 NOTE — PROGRESS NOTES
Patient a+o x 4, denies sob/lightheadedness/numbness/tingling/dizziness/chest pain/n/d/headache. +nausea this shift 2/2 po abx - zofran admin with no relief. MD changed po abx. Afebrile, vss.   Denied pain throughout shift. +pulses and csm to rle. Independent in room, wbat,  steady gait.  Denied difficulty voiding - adequate urine output. Dressing changed to rle - per orders. Dressing c/d/i. Fall precautions/hourly rounding maintained, call light within reach and functioning, all items within reach.  Patient encouraged to call for assistance, poc reviewed with patient, ?'s/concerns answered.

## 2018-02-28 ENCOUNTER — HOME CARE VISIT (OUTPATIENT)
Dept: HOME HEALTH SERVICES | Facility: HOME HEALTHCARE | Age: 53
End: 2018-02-28
Payer: MEDICAID

## 2018-02-28 VITALS
OXYGEN SATURATION: 99 % | HEART RATE: 69 BPM | SYSTOLIC BLOOD PRESSURE: 150 MMHG | DIASTOLIC BLOOD PRESSURE: 80 MMHG | TEMPERATURE: 98 F | WEIGHT: 285 LBS | BODY MASS INDEX: 38.65 KG/M2 | RESPIRATION RATE: 18 BRPM

## 2018-02-28 PROCEDURE — G0493 RN CARE EA 15 MIN HH/HOSPICE: HCPCS

## 2018-02-28 SDOH — ECONOMIC STABILITY: HOUSING INSECURITY: UNSAFE COOKING RANGE AREA: 0

## 2018-02-28 SDOH — ECONOMIC STABILITY: HOUSING INSECURITY: UNSAFE APPLIANCES: 0

## 2018-02-28 ASSESSMENT — ENCOUNTER SYMPTOMS
VOMITING: DENIES
NAUSEA: DENIES

## 2018-03-01 ENCOUNTER — TELEPHONE (OUTPATIENT)
Dept: VASCULAR LAB | Facility: MEDICAL CENTER | Age: 53
End: 2018-03-01

## 2018-03-02 ENCOUNTER — HOME CARE VISIT (OUTPATIENT)
Dept: HOME HEALTH SERVICES | Facility: HOME HEALTHCARE | Age: 53
End: 2018-03-02
Payer: MEDICAID

## 2018-03-02 PROCEDURE — G0151 HHCP-SERV OF PT,EA 15 MIN: HCPCS

## 2018-03-04 VITALS
HEART RATE: 65 BPM | TEMPERATURE: 98.1 F | DIASTOLIC BLOOD PRESSURE: 80 MMHG | RESPIRATION RATE: 18 BRPM | SYSTOLIC BLOOD PRESSURE: 128 MMHG | OXYGEN SATURATION: 100 %

## 2018-03-04 SDOH — ECONOMIC STABILITY: HOUSING INSECURITY
HOME SAFETY: PT LIVES WITH ROOMMATES AND THEY HELP AS NEEDED, HAS 4 STEPS INTO HOME AND NO RAILING, DOES HAVE ONE SET IN THE BACK NOT AS STEEP AND HAS A RAILING AND THEN PT HAS TO WALK FROM THE SIDE OF THE HOUSE TO THE FRONT NOT AS DIFFICULT BUT STILL HAS TO GO T

## 2018-03-04 SDOH — ECONOMIC STABILITY: HOUSING INSECURITY: UNSAFE APPLIANCES: 0

## 2018-03-04 SDOH — ECONOMIC STABILITY: HOUSING INSECURITY: HOME SAFETY: HROUGH ICE AND SNOW. OTHERWISE IS ABLE TO TAKE CARE OF SELF FOR THE MOST PART.

## 2018-03-04 SDOH — ECONOMIC STABILITY: HOUSING INSECURITY: UNSAFE COOKING RANGE AREA: 0

## 2018-03-04 ASSESSMENT — BALANCE ASSESSMENTS
SURVEY COMPLETE: TRUE
ATTEMPTS TO ARISE: 1
EYES CLOSED AT MAXIMUM POSITION NUDGED: 1
NUDGED: 1
TURNING 360 DEGREES STEADINESS: 0
SITTING DOWN: 1
IMMEDIATE STANDING BALANCE FIRST 5 SECONDS: 1
SITTING BALANCE: 1
STANDING BALANCE: 1
BALANCE SCORE: 9
TURNING 360 DEGREES STEPS: 1
ARISES: 1

## 2018-03-04 ASSESSMENT — GAIT ASSESSMENTS
RIGHT STEP PASS: 1
SURVEY COMPLETE: TRUE
LEFT STEP PASS: 1
GAIT SCORE: 9
PATH: 1
TIME: 0
BALANCE AND GAIT SCORE: 18
LEFT STEP CLEAR: 1
STEP SYMMETRY: 1
RIGHT STEP CLEAR: 1
TRUNK: 1
STEP CONTINUITY: 1
INITIATION OF GAIT IMMEDIATELY AFTER GO: 1

## 2018-03-04 ASSESSMENT — ACTIVITIES OF DAILY LIVING (ADL)
HOUSEKEEPING_ASSISTANCE: 6
EATING_ASSISTANCE: 0
GROOMING_ASSISTANCE: 0
ORAL_CARE_ASSISTANCE: 0
DRESSING_LB_ASSISTANCE: 0
DRESSING_UB_ASSISTANCE: 0
TOILETING_ASSISTANCE: 0
LAUNDRY_ASSISTANCE: 6
SHOPPING_ASSISTANCE: 6
MEAL_PREP_ASSISTANCE: 3
MEAL_PREP_ASSISTANCE: 1
MEAL_PREP_ASSISTANCE: 2
TELEPHONE_ASSISTANCE: 0
TRANSPORTATION_ASSISTANCE: 6

## 2018-03-05 ENCOUNTER — HOME CARE VISIT (OUTPATIENT)
Dept: HOME HEALTH SERVICES | Facility: HOME HEALTHCARE | Age: 53
End: 2018-03-05
Payer: MEDICAID

## 2018-03-05 ENCOUNTER — TELEPHONE (OUTPATIENT)
Dept: CARDIOLOGY | Facility: MEDICAL CENTER | Age: 53
End: 2018-03-05

## 2018-03-05 VITALS
DIASTOLIC BLOOD PRESSURE: 80 MMHG | TEMPERATURE: 97.8 F | OXYGEN SATURATION: 97 % | SYSTOLIC BLOOD PRESSURE: 140 MMHG | HEART RATE: 82 BPM | RESPIRATION RATE: 18 BRPM

## 2018-03-05 PROCEDURE — G0493 RN CARE EA 15 MIN HH/HOSPICE: HCPCS

## 2018-03-05 NOTE — TELEPHONE ENCOUNTER
Spoke with patient; aware of location; pt will bring list of medication; hasn't seen a cardiologist before

## 2018-03-05 NOTE — DOCUMENTATION QUERY
DOCUMENTATION QUERY    PROVIDERS: Please select “Cosign w/ note”to reply to query.    To better represent the severity of illness of your patient, please review the following information and exercise your independent professional judgment in responding to this query.     Excisional debridement of the right foot is documented in the Operative Report dated 2/19/18. Based upon the clinical findings, risk factors, and treatment, can this procedure be further specified?    • Excisional Debridement (please include)  1. deepest level of debridement treated    • Other explanation of clinical findings (please document)    • Unable to determine          The medical record reflects the following:   Clinical Findings  diabetic foot ulcer with multiple abscesses at amputation site   Treatment  antibiotics, debridement   Risk Factors  DM w/neuropathy, noncompliance   Location within medical record  Operative Report DOS 2/19/18     Thank you,   Kacie Alves, [facility]  Inpatient   [place]  [email or web address]

## 2018-03-06 NOTE — DOCUMENTATION QUERY
DOCUMENTATION QUERY     DR. OWENS, please review this Documentation Query and reply with an addendum. You co-signed only, which does not address the needed clarification from you to be able to code and complete this account.       To better represent the severity of illness of your patient, please review the following information and exercise your independent professional judgment in responding to this query.      Excisional debridement of the right foot is documented in the Operative Report dated 2/19/18. Based upon the clinical findings, risk factors, and treatment, can this procedure be further specified?     · Excisional Debridement (please include)  1. deepest level of debridement treated     · Other explanation of clinical findings (please document)     · Unable to determine      muscle        The medical record reflects the following:   Clinical Findings  diabetic foot ulcer with multiple abscesses at amputation site   Treatment  antibiotics, debridement   Risk Factors  DM w/neuropathy, noncompliance   Location within medical record  Operative Report DOS 2/19/18      Thank you,   Kacie Alves, [facility]  Inpatient   [facility]  [email or web address]

## 2018-03-07 ENCOUNTER — OFFICE VISIT (OUTPATIENT)
Dept: CARDIOLOGY | Facility: MEDICAL CENTER | Age: 53
End: 2018-03-07
Payer: MEDICAID

## 2018-03-07 ENCOUNTER — HOSPITAL ENCOUNTER (OUTPATIENT)
Dept: LAB | Facility: MEDICAL CENTER | Age: 53
End: 2018-03-07
Attending: FAMILY MEDICINE
Payer: MEDICAID

## 2018-03-07 VITALS
HEIGHT: 72 IN | DIASTOLIC BLOOD PRESSURE: 100 MMHG | SYSTOLIC BLOOD PRESSURE: 160 MMHG | WEIGHT: 298 LBS | OXYGEN SATURATION: 97 % | HEART RATE: 70 BPM | BODY MASS INDEX: 40.36 KG/M2

## 2018-03-07 DIAGNOSIS — L08.9 DIABETIC FOOT INFECTION (HCC): ICD-10-CM

## 2018-03-07 DIAGNOSIS — I48.0 PAROXYSMAL ATRIAL FIBRILLATION (HCC): ICD-10-CM

## 2018-03-07 DIAGNOSIS — E11.8 DM (DIABETES MELLITUS) WITH COMPLICATIONS (HCC): ICD-10-CM

## 2018-03-07 DIAGNOSIS — I10 ESSENTIAL HYPERTENSION: ICD-10-CM

## 2018-03-07 DIAGNOSIS — E11.621 TYPE 2 DIABETES MELLITUS WITH FOOT ULCER, WITHOUT LONG-TERM CURRENT USE OF INSULIN (HCC): ICD-10-CM

## 2018-03-07 DIAGNOSIS — L97.509 TYPE 2 DIABETES MELLITUS WITH FOOT ULCER, WITHOUT LONG-TERM CURRENT USE OF INSULIN (HCC): ICD-10-CM

## 2018-03-07 DIAGNOSIS — E11.628 DIABETIC FOOT INFECTION (HCC): ICD-10-CM

## 2018-03-07 DIAGNOSIS — Z79.01 ON CONTINUOUS ORAL ANTICOAGULATION: ICD-10-CM

## 2018-03-07 PROBLEM — I48.20 CHRONIC ATRIAL FIBRILLATION (HCC): Status: RESOLVED | Noted: 2018-02-08 | Resolved: 2018-03-07

## 2018-03-07 LAB
25(OH)D3 SERPL-MCNC: 6 NG/ML (ref 30–100)
ALBUMIN SERPL BCP-MCNC: 3.9 G/DL (ref 3.2–4.9)
ALBUMIN/GLOB SERPL: 1.2 G/DL
ALP SERPL-CCNC: 61 U/L (ref 30–99)
ALT SERPL-CCNC: 11 U/L (ref 2–50)
ANION GAP SERPL CALC-SCNC: 8 MMOL/L (ref 0–11.9)
AST SERPL-CCNC: 17 U/L (ref 12–45)
BILIRUB SERPL-MCNC: 0.9 MG/DL (ref 0.1–1.5)
BUN SERPL-MCNC: 15 MG/DL (ref 8–22)
CALCIUM SERPL-MCNC: 9.5 MG/DL (ref 8.5–10.5)
CHLORIDE SERPL-SCNC: 100 MMOL/L (ref 96–112)
CHOLEST SERPL-MCNC: 155 MG/DL (ref 100–199)
CO2 SERPL-SCNC: 29 MMOL/L (ref 20–33)
CREAT SERPL-MCNC: 0.64 MG/DL (ref 0.5–1.4)
CREAT UR-MCNC: 60.4 MG/DL
EST. AVERAGE GLUCOSE BLD GHB EST-MCNC: 123 MG/DL
GLOBULIN SER CALC-MCNC: 3.3 G/DL (ref 1.9–3.5)
GLUCOSE SERPL-MCNC: 134 MG/DL (ref 65–99)
HBA1C MFR BLD: 5.9 % (ref 0–5.6)
HDLC SERPL-MCNC: 33 MG/DL
LDLC SERPL CALC-MCNC: 82 MG/DL
MICROALBUMIN UR-MCNC: 15.8 MG/DL
MICROALBUMIN/CREAT UR: 262 MG/G (ref 0–30)
POTASSIUM SERPL-SCNC: 4.2 MMOL/L (ref 3.6–5.5)
PROT SERPL-MCNC: 7.2 G/DL (ref 6–8.2)
SODIUM SERPL-SCNC: 137 MMOL/L (ref 135–145)
TRIGL SERPL-MCNC: 199 MG/DL (ref 0–149)
TSH SERPL DL<=0.005 MIU/L-ACNC: 2.31 UIU/ML (ref 0.38–5.33)

## 2018-03-07 PROCEDURE — 82306 VITAMIN D 25 HYDROXY: CPT

## 2018-03-07 PROCEDURE — 80053 COMPREHEN METABOLIC PANEL: CPT

## 2018-03-07 PROCEDURE — 99244 OFF/OP CNSLTJ NEW/EST MOD 40: CPT | Performed by: INTERNAL MEDICINE

## 2018-03-07 PROCEDURE — 82043 UR ALBUMIN QUANTITATIVE: CPT

## 2018-03-07 PROCEDURE — 83036 HEMOGLOBIN GLYCOSYLATED A1C: CPT

## 2018-03-07 PROCEDURE — 80061 LIPID PANEL: CPT

## 2018-03-07 PROCEDURE — 84443 ASSAY THYROID STIM HORMONE: CPT

## 2018-03-07 PROCEDURE — 82570 ASSAY OF URINE CREATININE: CPT

## 2018-03-07 PROCEDURE — 36415 COLL VENOUS BLD VENIPUNCTURE: CPT

## 2018-03-07 RX ORDER — METOPROLOL TARTRATE 100 MG/1
100 TABLET ORAL 2 TIMES DAILY
Qty: 135 TAB | Refills: 3 | Status: SHIPPED | OUTPATIENT
Start: 2018-03-07 | End: 2018-04-24 | Stop reason: SDUPTHER

## 2018-03-07 NOTE — LETTER
[facility]   [address], [address]  [place] [address]  Phone: [phone]  Fax: [phone]              Sebastián Castro  1965    Encounter Date: 3/7/2018    Dr. Lomax,     Thank you for the referral. I had the pleasure of seeing Sebastián Castro today in cardiology clinic. I've attached my visit note below. If you have any questions please feel free to give me a call anytime.      Rodolfo Sanchez MD, FACC, [facility]  Interventional Cardiology  [facility]                                                                    PROGRESS NOTE:  Subjective:   Sebastián Castro is a 53 y.o. male who presents today for initial consultation of paroxysmal atrial fibrillation. He has a history of hypertension hyperlipidemia normal stress testing and relatively unremarkable echocardiogram recently. His family history of coronary artery disease. He does not smoke drink or do drugs. He is morbidly obese and also has diabetes with a diabetic foot infection leading to right toe amputation. Currently in sinus rhythm, has 2-3 episodes of atrial fibrillation lasting from 5 minutes to 30 minutes per week. No longer sustained episodes. He is on metoprolol and digoxin. He's never tried antiarrhythmic therapy. He is on rivaroxaban.    Denies any other cardiovascular symptoms including chest pain, shortness of breath, dyspnea on exertion, lightheadedness, syncope or presyncope, lower extremity edema, PND, orthopnea or palpitations.    Today I reviewed the medical, surgical, social and family histories with the patient. As per HPI, otherwise noncontributory.      Past Medical History:   Diagnosis Date   • A-fib (CMS-[facility])    • Chest pain    • Diabetes (CMS-[facility])    • Diabetic neuropathy (CMS-[facility])    • Hypercholesterolemia    • Hypertension    • Morbid obesity (CMS-[facility])    • Noncompliance    • Normal cardiac stress test      Past Surgical History:   Procedure  Laterality Date   • IRRIGATION & DEBRIDEMENT ORTHO Right 2/19/2018    Procedure: IRRIGATION & DEBRIDEMENT ORTHO-FOOT;  Surgeon: Kirby Lopez M.D.;  Location: SURGERY [facility];  Service: Orthopedics   • TOE AMPUTATION Right 1/17/2018    Procedure: TOE AMPUTATION-1ST RAY;  Surgeon: Doug Delong M.D.;  Location: SURGERY [facility];  Service: Orthopedics   • TOE AMPUTATION Right 11/10/2017    Procedure: TOE AMPUTATION;  Surgeon: Osorio Leo M.D.;  Location: SURGERY [facility];  Service: Orthopedics     History reviewed. No pertinent family history.  History   Smoking Status   • Never Smoker   Smokeless Tobacco   • Never Used     Allergies   Allergen Reactions   • Daptomycin Rash     Body rash  RXN=1/2018     • Pcn [Penicillins] Hives and Rash      Rash & hives  Tolerating Ampicillin + Sulbactam 11/12/17  RXN=since a kid   • Unasyn [Ampicillin-Sulbactam Sodium] Hives, Itching and Swelling   • Vancomycin Rash     Red man syndrome     Outpatient Encounter Prescriptions as of 3/7/2018   Medication Sig Dispense Refill   • metoprolol (LOPRESSOR) 100 MG Tab Take 1 Tab by mouth 2 times a day. 135 Tab 3   • acetaminophen (TYLENOL) 325 MG Tab Take 2 Tabs by mouth every 6 hours as needed (Mild Pain; (Pain scale 1-3); Temp greater than 100.5 F). 30 Tab 0   • amLODIPine (NORVASC) 5 MG Tab Take 1 Tab by mouth every day. (Patient taking differently: Take 5 mg by mouth 2 Times a Day.) 30 Tab 3   • digoxin (LANOXIN) 250 MCG Tab Take 250 mcg by mouth every day.     • rivaroxaban (XARELTO) 20 MG Tab tablet Take 1 Tab by mouth with dinner. 30 Tab 0   • famotidine (PEPCID) 20 MG Tab Take 20 mg by mouth 2 times a day.     • aspirin EC 81 MG EC tablet Take 1 Tab by mouth every day. 30 Tab    • metformin (GLUCOPHAGE) 500 MG Tab Take 2 Tabs by mouth 2 times a day, with meals. 60 Tab 0   • pravastatin (PRAVACHOL) 20 MG Tab Take 1 Tab by mouth every evening. 30 Tab 11   • lisinopril (PRINIVIL, ZESTRIL) 40 MG tablet  Take 1 Tab by mouth every day. 30 Tab    • sulfamethoxazole-trimethoprim (BACTRIM DS) 800-160 MG tablet Take 1 Tab by mouth every 12 hours for 14 days. 28 Tab 0   • [DISCONTINUED] metoprolol 75 MG Tab Take 75 mg by mouth 2 Times a Day. 60 Tab 3     No facility-administered encounter medications on file as of 3/7/2018.      Review of Systems   All other systems reviewed and are negative.       Objective:   /100   Pulse 70   Ht 1.829 m (6')   Wt (!) 135.2 kg (298 lb)   SpO2 97%   BMI 40.42 kg/m²      Physical Exam   Constitutional: He is oriented to person, place, and time. He appears well-developed and well-nourished. No distress.   Morbidly obese   HENT:   Head: Normocephalic and atraumatic.   Mouth/Throat: Oropharynx is clear and moist.   Eyes: Conjunctivae are normal. Pupils are equal, round, and reactive to light. No scleral icterus.   Neck: No JVD present. No tracheal deviation present. No thyromegaly present.   Cardiovascular: Normal rate, regular rhythm, normal heart sounds and intact distal pulses.  Exam reveals no gallop and no friction rub.    No murmur heard.  Pulses:       Radial pulses are 2+ on the right side, and 2+ on the left side.        Popliteal pulses are 2+ on the right side, and 2+ on the left side.        Dorsalis pedis pulses are 1+ on the left side. Right dorsalis pedis pulse not accessible.        Posterior tibial pulses are 1+ on the left side. Right posterior tibial pulse not accessible.   Right foot boot   Pulmonary/Chest: Effort normal and breath sounds normal. He has no wheezes. He has no rales.   Abdominal: Soft. Bowel sounds are normal. He exhibits no distension. There is no tenderness. There is no guarding.   Musculoskeletal: He exhibits no edema.   Neurological: He is alert and oriented to person, place, and time. No cranial nerve deficit.   Skin: Skin is warm and dry. No rash noted. He is not diaphoretic. No erythema.   Psychiatric: He has a normal mood and affect. His  behavior is normal. Thought content normal.   Vitals reviewed.     LABS:  Lab Results   Component Value Date/Time    CHOLSTRLTOT 149 11/29/2017 06:00 AM    LDL 86 11/29/2017 06:00 AM    HDL 32 (A) 11/29/2017 06:00 AM    TRIGLYCERIDE 153 (H) 11/29/2017 06:00 AM       Lab Results   Component Value Date/Time    WBC 8.9 02/21/2018 05:23 AM    RBC 4.16 (L) 02/21/2018 05:23 AM    HEMOGLOBIN 11.9 (L) 02/21/2018 05:23 AM    HEMATOCRIT 36.4 (L) 02/21/2018 05:23 AM    MCV 87.5 02/21/2018 05:23 AM    NEUTSPOLYS 55.60 02/21/2018 05:23 AM    LYMPHOCYTES 24.20 02/21/2018 05:23 AM    MONOCYTES 7.70 02/21/2018 05:23 AM    EOSINOPHILS 11.90 (H) 02/21/2018 05:23 AM    BASOPHILS 0.30 02/21/2018 05:23 AM     Lab Results   Component Value Date/Time    SODIUM 136 02/25/2018 03:11 PM    POTASSIUM 4.0 02/25/2018 03:11 PM    CHLORIDE 102 02/25/2018 03:11 PM    CO2 25 02/25/2018 03:11 PM    GLUCOSE 160 (H) 02/25/2018 03:11 PM    BUN 15 02/25/2018 03:11 PM    CREATININE 1.12 02/25/2018 03:11 PM     Lab Results   Component Value Date    HBA1C 6.8 (H) 02/02/2018      Lab Results   Component Value Date/Time    ALKPHOSPHAT 53 02/09/2018 12:37 AM    ASTSGOT 14 02/09/2018 12:37 AM    ALTSGPT 11 02/09/2018 12:37 AM    TBILIRUBIN 0.7 02/09/2018 12:37 AM      Lab Results   Component Value Date/Time    BNPBTYPENAT 150 (H) 02/08/2018 02:00 PM      No results found for: TSH  Lab Results   Component Value Date/Time    PROTHROMBTM 24.9 (H) 02/08/2018 02:00 PM    INR 2.29 (H) 02/08/2018 02:00 PM          EKG (2/8/2018):  I have personally reviewed the EKG this visit and discussed with the patient. It shows atrial fibrillation.    ECHO CONCLUSIONS (1/27/2018):  Technically difficult but adequate study for interpretation.   Contrast was used to enhance visualization of the endocardial border.  Normal left ventricular chamber size.  Normal left ventricular systolic function.  Left ventricular ejection fraction is visually estimated to be 60%.  Mild concentric  left ventricular hypertrophy.  Trace mitral regurgitation.  Normal left atrial size.  Structurally normal aortic valve without significant stenosis or   regurgitation.  Mild tricuspid regurgitation.  Estimated right ventricular systolic pressure  is 35 mmHg.  Normal inferior vena cava size and inspiratory collapse.  Normal right ventricular size and systolic function.    STRESS (1/29/2018):   No evidence of significant jeopardized viable myocardium or prior   myocardial    infarction.   Normal left ventricular size, ejection fraction, and wall motion.      Assessment:     1. Paroxysmal atrial fibrillation (CMS-HCC)  metoprolol (LOPRESSOR) 100 MG Tab   2. Essential hypertension     3. On continuous oral anticoagulation     4. Type 2 diabetes mellitus with foot ulcer, without long-term current use of insulin (CMS-HCC)     5. Diabetic foot infection (CMS-HCC)         Medical Decision Making:  Today's Assessment / Status / Plan:     Has paroxysms of atrial fibrillation with a moderate burden that are symptomatic and rapid. We discussed rate versus rhythm control. Together we have decided on increasing his rate control strategy and continued anticoagulation which is very appropriate given his elevated stroke risk. He is also hypertensive and this will help with his blood pressure control as well. Should he fail to improve with increased medical therapy, then a rhythm control strategy would be recommended and flecainide would be a good initial choice.    1. Increase Lopressor to 150 mg twice a day from 75 mg twice a day  2. Continue other medical therapy  3. Continue oral anticoagulation  Xarelto (rivaroxaban)  4. If he fails to improve symptomatically with the increased rhythm control, and I recommend initiation of flecainide 50 mg twice a day and up titration to effect.    Thank you for this interesting consultation. It was my pleasure to see Sebastián Castro today.    Rodolfo Sanchez MD, FACC, [facility]  Division of  Interventional Cardiology  [facility]    Follow-up 3 months.        Jerad Lomax M.D.  [address] [place] [id number]  VIA In Basket

## 2018-03-07 NOTE — PROGRESS NOTES
Subjective:   Sebastián Castro is a 53 y.o. male who presents today for initial consultation of paroxysmal atrial fibrillation. He has a history of hypertension hyperlipidemia normal stress testing and relatively unremarkable echocardiogram recently. His family history of coronary artery disease. He does not smoke drink or do drugs. He is morbidly obese and also has diabetes with a diabetic foot infection leading to right toe amputation. Currently in sinus rhythm, has 2-3 episodes of atrial fibrillation lasting from 5 minutes to 30 minutes per week. No longer sustained episodes. He is on metoprolol and digoxin. He's never tried antiarrhythmic therapy. He is on rivaroxaban.    Denies any other cardiovascular symptoms including chest pain, shortness of breath, dyspnea on exertion, lightheadedness, syncope or presyncope, lower extremity edema, PND, orthopnea or palpitations.    Today I reviewed the medical, surgical, social and family histories with the patient. As per HPI, otherwise noncontributory.      Past Medical History:   Diagnosis Date   • A-fib (CMS-HCC)    • Chest pain    • Diabetes (CMS-[facility])    • Diabetic neuropathy (CMS-HCC)    • Hypercholesterolemia    • Hypertension    • Morbid obesity (CMS-HCC)    • Noncompliance    • Normal cardiac stress test      Past Surgical History:   Procedure Laterality Date   • IRRIGATION & DEBRIDEMENT ORTHO Right 2/19/2018    Procedure: IRRIGATION & DEBRIDEMENT ORTHO-FOOT;  Surgeon: Kirby Lopez M.D.;  Location: SURGERY [facility];  Service: Orthopedics   • TOE AMPUTATION Right 1/17/2018    Procedure: TOE AMPUTATION-1ST RAY;  Surgeon: Doug Delong M.D.;  Location: SURGERY [facility];  Service: Orthopedics   • TOE AMPUTATION Right 11/10/2017    Procedure: TOE AMPUTATION;  Surgeon: Osorio Leo M.D.;  Location: SURGERY [facility];  Service: Orthopedics     History reviewed. No pertinent family history.  History   Smoking Status   • Never Smoker   Smokeless  Tobacco   • Never Used     Allergies   Allergen Reactions   • Daptomycin Rash     Body rash  RXN=1/2018     • Pcn [Penicillins] Hives and Rash      Rash & hives  Tolerating Ampicillin + Sulbactam 11/12/17  RXN=since a kid   • Unasyn [Ampicillin-Sulbactam Sodium] Hives, Itching and Swelling   • Vancomycin Rash     Red man syndrome     Outpatient Encounter Prescriptions as of 3/7/2018   Medication Sig Dispense Refill   • metoprolol (LOPRESSOR) 100 MG Tab Take 1 Tab by mouth 2 times a day. 135 Tab 3   • acetaminophen (TYLENOL) 325 MG Tab Take 2 Tabs by mouth every 6 hours as needed (Mild Pain; (Pain scale 1-3); Temp greater than 100.5 F). 30 Tab 0   • amLODIPine (NORVASC) 5 MG Tab Take 1 Tab by mouth every day. (Patient taking differently: Take 5 mg by mouth 2 Times a Day.) 30 Tab 3   • digoxin (LANOXIN) 250 MCG Tab Take 250 mcg by mouth every day.     • rivaroxaban (XARELTO) 20 MG Tab tablet Take 1 Tab by mouth with dinner. 30 Tab 0   • famotidine (PEPCID) 20 MG Tab Take 20 mg by mouth 2 times a day.     • aspirin EC 81 MG EC tablet Take 1 Tab by mouth every day. 30 Tab    • metformin (GLUCOPHAGE) 500 MG Tab Take 2 Tabs by mouth 2 times a day, with meals. 60 Tab 0   • pravastatin (PRAVACHOL) 20 MG Tab Take 1 Tab by mouth every evening. 30 Tab 11   • lisinopril (PRINIVIL, ZESTRIL) 40 MG tablet Take 1 Tab by mouth every day. 30 Tab    • sulfamethoxazole-trimethoprim (BACTRIM DS) 800-160 MG tablet Take 1 Tab by mouth every 12 hours for 14 days. 28 Tab 0   • [DISCONTINUED] metoprolol 75 MG Tab Take 75 mg by mouth 2 Times a Day. 60 Tab 3     No facility-administered encounter medications on file as of 3/7/2018.      Review of Systems   All other systems reviewed and are negative.       Objective:   /100   Pulse 70   Ht 1.829 m (6')   Wt (!) 135.2 kg (298 lb)   SpO2 97%   BMI 40.42 kg/m²     Physical Exam   Constitutional: He is oriented to person, place, and time. He appears well-developed and well-nourished. No  distress.   Morbidly obese   HENT:   Head: Normocephalic and atraumatic.   Mouth/Throat: Oropharynx is clear and moist.   Eyes: Conjunctivae are normal. Pupils are equal, round, and reactive to light. No scleral icterus.   Neck: No JVD present. No tracheal deviation present. No thyromegaly present.   Cardiovascular: Normal rate, regular rhythm, normal heart sounds and intact distal pulses.  Exam reveals no gallop and no friction rub.    No murmur heard.  Pulses:       Radial pulses are 2+ on the right side, and 2+ on the left side.        Popliteal pulses are 2+ on the right side, and 2+ on the left side.        Dorsalis pedis pulses are 1+ on the left side. Right dorsalis pedis pulse not accessible.        Posterior tibial pulses are 1+ on the left side. Right posterior tibial pulse not accessible.   Right foot boot   Pulmonary/Chest: Effort normal and breath sounds normal. He has no wheezes. He has no rales.   Abdominal: Soft. Bowel sounds are normal. He exhibits no distension. There is no tenderness. There is no guarding.   Musculoskeletal: He exhibits no edema.   Neurological: He is alert and oriented to person, place, and time. No cranial nerve deficit.   Skin: Skin is warm and dry. No rash noted. He is not diaphoretic. No erythema.   Psychiatric: He has a normal mood and affect. His behavior is normal. Thought content normal.   Vitals reviewed.     LABS:  Lab Results   Component Value Date/Time    CHOLSTRLTOT 149 11/29/2017 06:00 AM    LDL 86 11/29/2017 06:00 AM    HDL 32 (A) 11/29/2017 06:00 AM    TRIGLYCERIDE 153 (H) 11/29/2017 06:00 AM       Lab Results   Component Value Date/Time    WBC 8.9 02/21/2018 05:23 AM    RBC 4.16 (L) 02/21/2018 05:23 AM    HEMOGLOBIN 11.9 (L) 02/21/2018 05:23 AM    HEMATOCRIT 36.4 (L) 02/21/2018 05:23 AM    MCV 87.5 02/21/2018 05:23 AM    NEUTSPOLYS 55.60 02/21/2018 05:23 AM    LYMPHOCYTES 24.20 02/21/2018 05:23 AM    MONOCYTES 7.70 02/21/2018 05:23 AM    EOSINOPHILS 11.90 (H)  02/21/2018 05:23 AM    BASOPHILS 0.30 02/21/2018 05:23 AM     Lab Results   Component Value Date/Time    SODIUM 136 02/25/2018 03:11 PM    POTASSIUM 4.0 02/25/2018 03:11 PM    CHLORIDE 102 02/25/2018 03:11 PM    CO2 25 02/25/2018 03:11 PM    GLUCOSE 160 (H) 02/25/2018 03:11 PM    BUN 15 02/25/2018 03:11 PM    CREATININE 1.12 02/25/2018 03:11 PM     Lab Results   Component Value Date    HBA1C 6.8 (H) 02/02/2018      Lab Results   Component Value Date/Time    ALKPHOSPHAT 53 02/09/2018 12:37 AM    ASTSGOT 14 02/09/2018 12:37 AM    ALTSGPT 11 02/09/2018 12:37 AM    TBILIRUBIN 0.7 02/09/2018 12:37 AM      Lab Results   Component Value Date/Time    BNPBTYPENAT 150 (H) 02/08/2018 02:00 PM      No results found for: TSH  Lab Results   Component Value Date/Time    PROTHROMBTM 24.9 (H) 02/08/2018 02:00 PM    INR 2.29 (H) 02/08/2018 02:00 PM          EKG (2/8/2018):  I have personally reviewed the EKG this visit and discussed with the patient. It shows atrial fibrillation.    ECHO CONCLUSIONS (1/27/2018):  Technically difficult but adequate study for interpretation.   Contrast was used to enhance visualization of the endocardial border.  Normal left ventricular chamber size.  Normal left ventricular systolic function.  Left ventricular ejection fraction is visually estimated to be 60%.  Mild concentric left ventricular hypertrophy.  Trace mitral regurgitation.  Normal left atrial size.  Structurally normal aortic valve without significant stenosis or   regurgitation.  Mild tricuspid regurgitation.  Estimated right ventricular systolic pressure  is 35 mmHg.  Normal inferior vena cava size and inspiratory collapse.  Normal right ventricular size and systolic function.    STRESS (1/29/2018):   No evidence of significant jeopardized viable myocardium or prior   myocardial    infarction.   Normal left ventricular size, ejection fraction, and wall motion.      Assessment:     1. Paroxysmal atrial fibrillation (CMS-HCC)  metoprolol  (LOPRESSOR) 100 MG Tab   2. Essential hypertension     3. On continuous oral anticoagulation     4. Type 2 diabetes mellitus with foot ulcer, without long-term current use of insulin (CMS-[facility])     5. Diabetic foot infection (CMS-[facility])         Medical Decision Making:  Today's Assessment / Status / Plan:     Has paroxysms of atrial fibrillation with a moderate burden that are symptomatic and rapid. We discussed rate versus rhythm control. Together we have decided on increasing his rate control strategy and continued anticoagulation which is very appropriate given his elevated stroke risk. He is also hypertensive and this will help with his blood pressure control as well. Should he fail to improve with increased medical therapy, then a rhythm control strategy would be recommended and flecainide would be a good initial choice.    1. Increase Lopressor to 150 mg twice a day from 75 mg twice a day  2. Continue other medical therapy  3. Continue oral anticoagulation  Xarelto (rivaroxaban)  4. If he fails to improve symptomatically with the increased rhythm control, and I recommend initiation of flecainide 50 mg twice a day and up titration to effect.    Thank you for this interesting consultation. It was my pleasure to see Sebastián Castro today.    Rodolfo Sanchez MD, FACC, [facility]  Division of Interventional Cardiology  [facility]    Follow-up 3 months.

## 2018-03-08 ENCOUNTER — HOME CARE VISIT (OUTPATIENT)
Dept: HOME HEALTH SERVICES | Facility: HOME HEALTHCARE | Age: 53
End: 2018-03-08
Payer: MEDICAID

## 2018-03-08 ENCOUNTER — OFFICE VISIT (OUTPATIENT)
Dept: MEDICAL GROUP | Facility: MEDICAL CENTER | Age: 53
End: 2018-03-08
Attending: FAMILY MEDICINE
Payer: MEDICAID

## 2018-03-08 VITALS
SYSTOLIC BLOOD PRESSURE: 130 MMHG | OXYGEN SATURATION: 95 % | DIASTOLIC BLOOD PRESSURE: 80 MMHG | WEIGHT: 295 LBS | BODY MASS INDEX: 39.96 KG/M2 | RESPIRATION RATE: 14 BRPM | HEART RATE: 64 BPM | TEMPERATURE: 97.6 F | HEIGHT: 72 IN

## 2018-03-08 DIAGNOSIS — L08.9 DIABETIC FOOT INFECTION (HCC): ICD-10-CM

## 2018-03-08 DIAGNOSIS — I48.21 PERMANENT ATRIAL FIBRILLATION (HCC): ICD-10-CM

## 2018-03-08 DIAGNOSIS — E11.628 DIABETIC FOOT INFECTION (HCC): ICD-10-CM

## 2018-03-08 DIAGNOSIS — I10 ESSENTIAL HYPERTENSION: ICD-10-CM

## 2018-03-08 DIAGNOSIS — E11.21 TYPE 2 DIABETES MELLITUS WITH DIABETIC NEPHROPATHY, UNSPECIFIED LONG TERM INSULIN USE STATUS: ICD-10-CM

## 2018-03-08 DIAGNOSIS — K21.9 GASTROESOPHAGEAL REFLUX DISEASE, ESOPHAGITIS PRESENCE NOT SPECIFIED: ICD-10-CM

## 2018-03-08 PROCEDURE — 99213 OFFICE O/P EST LOW 20 MIN: CPT | Performed by: FAMILY MEDICINE

## 2018-03-08 PROCEDURE — 99214 OFFICE O/P EST MOD 30 MIN: CPT | Performed by: FAMILY MEDICINE

## 2018-03-08 PROCEDURE — 665003 FOLLOW UP-HOME HEALTH

## 2018-03-08 PROCEDURE — G0299 HHS/HOSPICE OF RN EA 15 MIN: HCPCS

## 2018-03-08 RX ORDER — LISINOPRIL 40 MG/1
40 TABLET ORAL DAILY
Qty: 30 TAB | Refills: 3 | Status: SHIPPED | OUTPATIENT
Start: 2018-03-08 | End: 2018-04-24 | Stop reason: SDUPTHER

## 2018-03-08 RX ORDER — AMLODIPINE BESYLATE 5 MG/1
5 TABLET ORAL DAILY
Qty: 30 TAB | Refills: 3 | Status: ON HOLD | OUTPATIENT
Start: 2018-03-08 | End: 2018-03-19

## 2018-03-08 RX ORDER — PRAVASTATIN SODIUM 20 MG
20 TABLET ORAL EVERY EVENING
Qty: 30 TAB | Refills: 11 | Status: SHIPPED | OUTPATIENT
Start: 2018-03-08 | End: 2018-04-24 | Stop reason: SDUPTHER

## 2018-03-08 RX ORDER — ASPIRIN 81 MG/1
81 TABLET ORAL DAILY
Qty: 30 TAB | Refills: 3 | Status: SHIPPED | OUTPATIENT
Start: 2018-03-08 | End: 2018-04-24 | Stop reason: SDUPTHER

## 2018-03-08 RX ORDER — DIGOXIN 250 MCG
250 TABLET ORAL DAILY
Qty: 30 TAB | Refills: 3 | Status: SHIPPED | OUTPATIENT
Start: 2018-03-08 | End: 2018-04-24 | Stop reason: SDUPTHER

## 2018-03-08 RX ORDER — FAMOTIDINE 20 MG/1
20 TABLET, FILM COATED ORAL 2 TIMES DAILY
Qty: 60 TAB | Refills: 3 | Status: SHIPPED | OUTPATIENT
Start: 2018-03-08 | End: 2018-04-24 | Stop reason: SDUPTHER

## 2018-03-08 SDOH — ECONOMIC STABILITY: HOUSING INSECURITY: UNSAFE APPLIANCES: 0

## 2018-03-08 SDOH — ECONOMIC STABILITY: HOUSING INSECURITY: UNSAFE COOKING RANGE AREA: 0

## 2018-03-08 ASSESSMENT — ENCOUNTER SYMPTOMS
ABDOMINAL PAIN: 0
COUGH: 0
NAUSEA: 0
VOMITING: 0
NAUSEA: DENIES
VOMITING: DENIES
PALPITATIONS: 0
CHILLS: 0
SPUTUM PRODUCTION: 0
TINGLING: 1
FEVER: 0
SHORTNESS OF BREATH: 0

## 2018-03-08 NOTE — PROGRESS NOTES
Subjective:      Sebastián Castro is a 53 y.o. male who presents with Follow-Up and Labs Only            Patient here for follow-up of his recent blood work. He has a history of diabetes, diabetic foot infection, atrial fibrillation, gastroesophageal reflux disease and hypertension.     The results of his recent blood work are as follows:    3/7/2018 09:48  Sodium: 137  Potassium: 4.2  Chloride: 100  Co2: 29  Anion Gap: 8.0  Glucose: 134 (H)  Bun: 15  Creatinine: 0.64  GFR If : >60  GFR If Non : >60  Calcium: 9.5  AST(SGOT): 17  ALT(SGPT): 11  Alkaline Phosphatase: 61  Total Bilirubin: 0.9  Albumin: 3.9  Total Protein: 7.2  Globulin: 3.3  A-G Ratio: 1.2  Glycohemoglobin: 5.9 (H)  Estim. Avg Glu: 123  Cholesterol,Tot: 155  Triglycerides: 199 (H)  HDL: 33 (A)  LDL: 82  Micro Alb Creat Ratio: 262 (H)  Creatinine, Urine: 60.40  Microalbumin, Urine Random: 15.8  25-Hydroxy   Vitamin D 25: 6 (L)  TSH: 2.310    His last hemoglobin A1c was 5.9, will have him continue to use his metformin as directed. He will also continue to go to clinic for continued management of his diabetic foot wound. He also had recent debridement of his wound. We'll continue to check his feet on a regular basis for any further worsening of his diabetic foot wound as well as decrease in sensation. His microhematuria and creatinine ratio was slightly elevated, so we'll have him continue to use his lisinopril as directed. His glomerular filtration rate was within normal limits. So we'll continue to follow.    His total cholesterol was within normal limits as well as his LDL cholesterol. We'll have him continue to use his cholesterol medications as directed. Discussed diet and exercising as tolerated to help lower his cholesterol as well.    He is following up with cardiology for his atrial fibrillation. His metoprolol dose was recently adjusted. He is currently on several toes so will refer patient to the anticoagulation  "clinic as well for additional assistance in managing his overall total. We'll continue to follow.     Current medications, allergies, and problem list reviewed with patient and updated in EPIC.          Review of Systems   Constitutional: Negative for chills and fever.   HENT: Negative for hearing loss and tinnitus.    Respiratory: Negative for cough, sputum production and shortness of breath.    Cardiovascular: Negative for chest pain and palpitations.   Gastrointestinal: Negative for abdominal pain, nausea and vomiting.   Neurological: Positive for tingling.          Objective:     /80   Pulse 64   Temp 36.4 °C (97.6 °F)   Resp 14   Ht 1.829 m (6')   Wt (!) 133.8 kg (295 lb)   SpO2 95%   BMI 40.01 kg/m²      Physical Exam   Constitutional: He is oriented to person, place, and time.   BMI 40   HENT:   Head: Normocephalic and atraumatic.   Cardiovascular: Normal rate, regular rhythm and normal heart sounds.  Exam reveals no friction rub.    No murmur heard.  Pulmonary/Chest: Effort normal and breath sounds normal. No respiratory distress. He has no wheezes. He has no rales.   Abdominal: Soft. Bowel sounds are normal. He exhibits no distension. There is no tenderness.   Musculoskeletal:   Using walker to ambulate, with bilateral walking boots on both feet   Neurological: He is alert and oriented to person, place, and time.   Skin: Skin is warm and dry.   Psychiatric: He has a normal mood and affect. His behavior is normal.   Nursing note and vitals reviewed.              Assessment/Plan:     1. Permanent atrial fibrillation (CMS-HCC)  Will continue to use his medications as directed. He'll also continue to follow-up with cardiology as directed. He is on xarelto referral to the\" clinic has been made today. He is not having any bleeding or unusual bruising. We'll continue to follow.  - REFERRAL TO ANTICOAGULATION MONITORING    2. Gastroesophageal reflux disease, esophagitis presence not specified  Will " have continue to use his medication as directed. Will continue to follow.    3. Diabetic foot infection (CMS-HCC)  He will continue to go to the clinic for additional assistance in management of his previously infected wound. We'll continue to follow.    4. Type 2 diabetes mellitus with diabetic nephropathy, unspecified long term insulin use status (CMS-HCC)  He'll continue to use his medication as directed. Reviewed the results of his recent blood work. Discussed referring to a ophthalmologist for a diabetic retinal exam. We'll continue to follow.    5. Essential hypertension  Will have patient continue to take his medication as directed. He has been advised to monitor blood pressure at home and keep notes. If blood pressure elevated or having symptoms of CP, SOB or neurologic changes to go to the er.

## 2018-03-09 ENCOUNTER — TELEPHONE (OUTPATIENT)
Dept: VASCULAR LAB | Facility: MEDICAL CENTER | Age: 53
End: 2018-03-09

## 2018-03-09 VITALS
SYSTOLIC BLOOD PRESSURE: 120 MMHG | DIASTOLIC BLOOD PRESSURE: 78 MMHG | OXYGEN SATURATION: 98 % | HEART RATE: 74 BPM | RESPIRATION RATE: 18 BRPM | TEMPERATURE: 98.6 F

## 2018-03-09 ASSESSMENT — ENCOUNTER SYMPTOMS
NAUSEA: DENIES
VOMITING: DENIES

## 2018-03-09 ASSESSMENT — ACTIVITIES OF DAILY LIVING (ADL)
HOME_HEALTH_OASIS: 04
OASIS_M1830: 03

## 2018-03-12 ENCOUNTER — HOME CARE VISIT (OUTPATIENT)
Dept: HOME HEALTH SERVICES | Facility: HOME HEALTHCARE | Age: 53
End: 2018-03-12
Payer: MEDICAID

## 2018-03-12 VITALS
OXYGEN SATURATION: 98 % | DIASTOLIC BLOOD PRESSURE: 66 MMHG | HEART RATE: 67 BPM | RESPIRATION RATE: 16 BRPM | TEMPERATURE: 97.7 F | SYSTOLIC BLOOD PRESSURE: 134 MMHG | BODY MASS INDEX: 39.87 KG/M2 | WEIGHT: 294 LBS

## 2018-03-12 PROCEDURE — G0151 HHCP-SERV OF PT,EA 15 MIN: HCPCS

## 2018-03-12 PROCEDURE — G0300 HHS/HOSPICE OF LPN EA 15 MIN: HCPCS

## 2018-03-12 SDOH — ECONOMIC STABILITY: HOUSING INSECURITY: UNSAFE APPLIANCES: 0

## 2018-03-12 SDOH — ECONOMIC STABILITY: HOUSING INSECURITY: UNSAFE COOKING RANGE AREA: 0

## 2018-03-12 ASSESSMENT — ACTIVITIES OF DAILY LIVING (ADL): HOME_HEALTH_OASIS: 01

## 2018-03-12 ASSESSMENT — ENCOUNTER SYMPTOMS: SHORTNESS OF BREATH: T

## 2018-03-13 VITALS
OXYGEN SATURATION: 98 % | TEMPERATURE: 98 F | DIASTOLIC BLOOD PRESSURE: 80 MMHG | SYSTOLIC BLOOD PRESSURE: 130 MMHG | RESPIRATION RATE: 18 BRPM | HEART RATE: 71 BPM

## 2018-03-13 SDOH — ECONOMIC STABILITY: HOUSING INSECURITY: UNSAFE APPLIANCES: 0

## 2018-03-13 SDOH — ECONOMIC STABILITY: HOUSING INSECURITY
HOME SAFETY: PT LIVES WITH ROOMMATES AND THEY HELP OUT A LOT, PT HAS 4 STEPS TO GET INTO HOME WITH NO RAILING AND THEN HAS BACK/SIDE STEPS AND THERE ARE 3-4 AND IT HAS A RAILING.

## 2018-03-13 SDOH — ECONOMIC STABILITY: HOUSING INSECURITY: UNSAFE COOKING RANGE AREA: 0

## 2018-03-13 ASSESSMENT — ACTIVITIES OF DAILY LIVING (ADL)
DRESSING_LB_ASSISTANCE: 0
BATHING_ASSISTANCE: 1
ORAL_CARE_ASSISTANCE: 0
EATING_ASSISTANCE: 0
OASIS_M1830: 03
MEAL_PREP_ASSISTANCE: 6
TELEPHONE_ASSISTANCE: 0
BATHING_ASSISTANCE: 2
SHOPPING_ASSISTANCE: 6
TRANSPORTATION_ASSISTANCE: 6
DRESSING_UB_ASSISTANCE: 0
HOUSEKEEPING_ASSISTANCE: 6
LAUNDRY_ASSISTANCE: 6
GROOMING_ASSISTANCE: 0
TOILETING_ASSISTANCE: 0

## 2018-03-13 ASSESSMENT — BALANCE ASSESSMENTS
ARISES: 1
IMMEDIATE STANDING BALANCE FIRST 5 SECONDS: 1
TURNING 360 DEGREES STEPS: 1
SITTING DOWN: 1
STANDING BALANCE: 1
SITTING BALANCE: 1
BALANCE SCORE: 10
EYES CLOSED AT MAXIMUM POSITION NUDGED: 1
SURVEY COMPLETE: TRUE
NUDGED: 2
TURNING 360 DEGREES STEADINESS: 0
ATTEMPTS TO ARISE: 1

## 2018-03-13 ASSESSMENT — GAIT ASSESSMENTS
LEFT STEP CLEAR: 1
SURVEY COMPLETE: TRUE
INITIATION OF GAIT IMMEDIATELY AFTER GO: 1
TIME: 0
LEFT STEP PASS: 1
PATH: 1
STEP SYMMETRY: 1
RIGHT STEP CLEAR: 1
TRUNK: 1
GAIT SCORE: 9
RIGHT STEP PASS: 1
STEP CONTINUITY: 1
BALANCE AND GAIT SCORE: 19

## 2018-03-14 LAB
MYCOBACTERIUM SPEC CULT: NORMAL
RHODAMINE-AURAMINE STN SPEC: NORMAL
RHODAMINE-AURAMINE STN SPEC: NORMAL
SIGNIFICANT IND 70042: NORMAL
SITE SITE: NORMAL
SOURCE SOURCE: NORMAL

## 2018-03-14 PROCEDURE — G0179 MD RECERTIFICATION HHA PT: HCPCS | Performed by: FAMILY MEDICINE

## 2018-03-15 ENCOUNTER — APPOINTMENT (OUTPATIENT)
Dept: RADIOLOGY | Facility: MEDICAL CENTER | Age: 53
DRG: 863 | End: 2018-03-15
Attending: EMERGENCY MEDICINE
Payer: MEDICAID

## 2018-03-15 ENCOUNTER — RESOLUTE PROFESSIONAL BILLING HOSPITAL PROF FEE (OUTPATIENT)
Dept: HOSPITALIST | Facility: MEDICAL CENTER | Age: 53
End: 2018-03-15
Payer: MEDICAID

## 2018-03-15 ENCOUNTER — HOSPITAL ENCOUNTER (INPATIENT)
Facility: MEDICAL CENTER | Age: 53
LOS: 5 days | DRG: 863 | End: 2018-03-20
Attending: EMERGENCY MEDICINE | Admitting: INTERNAL MEDICINE
Payer: MEDICAID

## 2018-03-15 ENCOUNTER — HOME CARE VISIT (OUTPATIENT)
Dept: HOME HEALTH SERVICES | Facility: HOME HEALTHCARE | Age: 53
End: 2018-03-15
Payer: MEDICAID

## 2018-03-15 VITALS
RESPIRATION RATE: 16 BRPM | HEART RATE: 84 BPM | TEMPERATURE: 97.7 F | SYSTOLIC BLOOD PRESSURE: 112 MMHG | DIASTOLIC BLOOD PRESSURE: 60 MMHG | OXYGEN SATURATION: 98 %

## 2018-03-15 DIAGNOSIS — E11.628 DIABETIC FOOT INFECTION (HCC): ICD-10-CM

## 2018-03-15 DIAGNOSIS — L08.9 DIABETIC FOOT INFECTION (HCC): ICD-10-CM

## 2018-03-15 DIAGNOSIS — L08.9 FOOT INFECTION: ICD-10-CM

## 2018-03-15 LAB
ALBUMIN SERPL BCP-MCNC: 4 G/DL (ref 3.2–4.9)
ALBUMIN/GLOB SERPL: 1.1 G/DL
ALP SERPL-CCNC: 84 U/L (ref 30–99)
ALT SERPL-CCNC: 42 U/L (ref 2–50)
ANION GAP SERPL CALC-SCNC: 9 MMOL/L (ref 0–11.9)
AST SERPL-CCNC: 38 U/L (ref 12–45)
BASOPHILS # BLD AUTO: 0.4 % (ref 0–1.8)
BASOPHILS # BLD: 0.05 K/UL (ref 0–0.12)
BILIRUB SERPL-MCNC: 0.7 MG/DL (ref 0.1–1.5)
BUN SERPL-MCNC: 16 MG/DL (ref 8–22)
CALCIUM SERPL-MCNC: 9.3 MG/DL (ref 8.5–10.5)
CHLORIDE SERPL-SCNC: 99 MMOL/L (ref 96–112)
CO2 SERPL-SCNC: 27 MMOL/L (ref 20–33)
CREAT SERPL-MCNC: 0.71 MG/DL (ref 0.5–1.4)
CRP SERPL HS-MCNC: 1.68 MG/DL (ref 0–0.75)
EOSINOPHIL # BLD AUTO: 0.58 K/UL (ref 0–0.51)
EOSINOPHIL NFR BLD: 4.4 % (ref 0–6.9)
ERYTHROCYTE [DISTWIDTH] IN BLOOD BY AUTOMATED COUNT: 46.2 FL (ref 35.9–50)
ERYTHROCYTE [SEDIMENTATION RATE] IN BLOOD BY WESTERGREN METHOD: 35 MM/HOUR (ref 0–20)
GLOBULIN SER CALC-MCNC: 3.5 G/DL (ref 1.9–3.5)
GLUCOSE SERPL-MCNC: 169 MG/DL (ref 65–99)
HCT VFR BLD AUTO: 42.2 % (ref 42–52)
HGB BLD-MCNC: 14.1 G/DL (ref 14–18)
IMM GRANULOCYTES # BLD AUTO: 0.06 K/UL (ref 0–0.11)
IMM GRANULOCYTES NFR BLD AUTO: 0.5 % (ref 0–0.9)
LACTATE BLD-SCNC: 2 MMOL/L (ref 0.5–2)
LYMPHOCYTES # BLD AUTO: 3.08 K/UL (ref 1–4.8)
LYMPHOCYTES NFR BLD: 23.1 % (ref 22–41)
MCH RBC QN AUTO: 28.8 PG (ref 27–33)
MCHC RBC AUTO-ENTMCNC: 33.4 G/DL (ref 33.7–35.3)
MCV RBC AUTO: 86.1 FL (ref 81.4–97.8)
MONOCYTES # BLD AUTO: 0.74 K/UL (ref 0–0.85)
MONOCYTES NFR BLD AUTO: 5.6 % (ref 0–13.4)
NEUTROPHILS # BLD AUTO: 8.82 K/UL (ref 1.82–7.42)
NEUTROPHILS NFR BLD: 66 % (ref 44–72)
NRBC # BLD AUTO: 0 K/UL
NRBC BLD-RTO: 0 /100 WBC
PLATELET # BLD AUTO: 310 K/UL (ref 164–446)
PMV BLD AUTO: 11.2 FL (ref 9–12.9)
POTASSIUM SERPL-SCNC: 4.4 MMOL/L (ref 3.6–5.5)
PROT SERPL-MCNC: 7.5 G/DL (ref 6–8.2)
RBC # BLD AUTO: 4.9 M/UL (ref 4.7–6.1)
SODIUM SERPL-SCNC: 135 MMOL/L (ref 135–145)
WBC # BLD AUTO: 13.3 K/UL (ref 4.8–10.8)

## 2018-03-15 PROCEDURE — 700111 HCHG RX REV CODE 636 W/ 250 OVERRIDE (IP): Performed by: EMERGENCY MEDICINE

## 2018-03-15 PROCEDURE — A9270 NON-COVERED ITEM OR SERVICE: HCPCS | Performed by: INTERNAL MEDICINE

## 2018-03-15 PROCEDURE — G0299 HHS/HOSPICE OF RN EA 15 MIN: HCPCS

## 2018-03-15 PROCEDURE — 700105 HCHG RX REV CODE 258: Performed by: INTERNAL MEDICINE

## 2018-03-15 PROCEDURE — 87040 BLOOD CULTURE FOR BACTERIA: CPT

## 2018-03-15 PROCEDURE — 82962 GLUCOSE BLOOD TEST: CPT

## 2018-03-15 PROCEDURE — 85025 COMPLETE CBC W/AUTO DIFF WBC: CPT

## 2018-03-15 PROCEDURE — 96365 THER/PROPH/DIAG IV INF INIT: CPT

## 2018-03-15 PROCEDURE — 96375 TX/PRO/DX INJ NEW DRUG ADDON: CPT

## 2018-03-15 PROCEDURE — A6403 STERILE GAUZE>16 <= 48 SQ IN: HCPCS

## 2018-03-15 PROCEDURE — 85652 RBC SED RATE AUTOMATED: CPT

## 2018-03-15 PROCEDURE — 80053 COMPREHEN METABOLIC PANEL: CPT

## 2018-03-15 PROCEDURE — 6650300 HCR  CLEANSER 4-IN-1

## 2018-03-15 PROCEDURE — 96366 THER/PROPH/DIAG IV INF ADDON: CPT

## 2018-03-15 PROCEDURE — 770006 HCHG ROOM/CARE - MED/SURG/GYN SEMI*

## 2018-03-15 PROCEDURE — 83605 ASSAY OF LACTIC ACID: CPT

## 2018-03-15 PROCEDURE — 99223 1ST HOSP IP/OBS HIGH 75: CPT | Performed by: INTERNAL MEDICINE

## 2018-03-15 PROCEDURE — 700105 HCHG RX REV CODE 258: Performed by: EMERGENCY MEDICINE

## 2018-03-15 PROCEDURE — 700102 HCHG RX REV CODE 250 W/ 637 OVERRIDE(OP): Performed by: INTERNAL MEDICINE

## 2018-03-15 PROCEDURE — 99285 EMERGENCY DEPT VISIT HI MDM: CPT

## 2018-03-15 PROCEDURE — 86140 C-REACTIVE PROTEIN: CPT

## 2018-03-15 PROCEDURE — 73630 X-RAY EXAM OF FOOT: CPT | Mod: RT

## 2018-03-15 RX ORDER — ACETAMINOPHEN 325 MG/1
650 TABLET ORAL EVERY 6 HOURS PRN
Status: DISCONTINUED | OUTPATIENT
Start: 2018-03-15 | End: 2018-03-16

## 2018-03-15 RX ORDER — DEXTROSE MONOHYDRATE 25 G/50ML
25 INJECTION, SOLUTION INTRAVENOUS
Status: DISCONTINUED | OUTPATIENT
Start: 2018-03-15 | End: 2018-03-20 | Stop reason: HOSPADM

## 2018-03-15 RX ORDER — BISACODYL 10 MG
10 SUPPOSITORY, RECTAL RECTAL
Status: DISCONTINUED | OUTPATIENT
Start: 2018-03-15 | End: 2018-03-20 | Stop reason: HOSPADM

## 2018-03-15 RX ORDER — L. ACIDOPHILUS/L.BULGARICUS 100MM CELL
1 GRANULES IN PACKET (EA) ORAL
Status: DISCONTINUED | OUTPATIENT
Start: 2018-03-15 | End: 2018-03-20 | Stop reason: HOSPADM

## 2018-03-15 RX ORDER — AMLODIPINE BESYLATE 5 MG/1
5 TABLET ORAL DAILY
Status: DISCONTINUED | OUTPATIENT
Start: 2018-03-16 | End: 2018-03-16

## 2018-03-15 RX ORDER — SULFAMETHOXAZOLE AND TRIMETHOPRIM 800; 160 MG/1; MG/1
1 TABLET ORAL 2 TIMES DAILY
COMMUNITY
Start: 2018-03-03 | End: 2018-03-12

## 2018-03-15 RX ORDER — AMOXICILLIN 250 MG
2 CAPSULE ORAL 2 TIMES DAILY
Status: DISCONTINUED | OUTPATIENT
Start: 2018-03-15 | End: 2018-03-20 | Stop reason: HOSPADM

## 2018-03-15 RX ORDER — FAMOTIDINE 20 MG/1
20 TABLET, FILM COATED ORAL 2 TIMES DAILY
Status: DISCONTINUED | OUTPATIENT
Start: 2018-03-15 | End: 2018-03-20 | Stop reason: HOSPADM

## 2018-03-15 RX ORDER — PRAVASTATIN SODIUM 10 MG
20 TABLET ORAL EVERY EVENING
Status: DISCONTINUED | OUTPATIENT
Start: 2018-03-15 | End: 2018-03-20 | Stop reason: HOSPADM

## 2018-03-15 RX ORDER — METOPROLOL TARTRATE 100 MG/1
100 TABLET ORAL 2 TIMES DAILY
Status: DISCONTINUED | OUTPATIENT
Start: 2018-03-15 | End: 2018-03-16

## 2018-03-15 RX ORDER — SODIUM CHLORIDE 9 MG/ML
500 INJECTION, SOLUTION INTRAVENOUS
Status: DISCONTINUED | OUTPATIENT
Start: 2018-03-15 | End: 2018-03-20 | Stop reason: HOSPADM

## 2018-03-15 RX ORDER — LISINOPRIL 20 MG/1
40 TABLET ORAL DAILY
Status: DISCONTINUED | OUTPATIENT
Start: 2018-03-16 | End: 2018-03-20 | Stop reason: HOSPADM

## 2018-03-15 RX ORDER — CIPROFLOXACIN 2 MG/ML
400 INJECTION, SOLUTION INTRAVENOUS EVERY 12 HOURS
Status: DISCONTINUED | OUTPATIENT
Start: 2018-03-15 | End: 2018-03-15

## 2018-03-15 RX ORDER — SODIUM CHLORIDE 9 MG/ML
INJECTION, SOLUTION INTRAVENOUS CONTINUOUS
Status: DISCONTINUED | OUTPATIENT
Start: 2018-03-15 | End: 2018-03-20 | Stop reason: HOSPADM

## 2018-03-15 RX ORDER — CEFTRIAXONE 2 G/1
2 INJECTION, POWDER, FOR SOLUTION INTRAMUSCULAR; INTRAVENOUS ONCE
Status: COMPLETED | OUTPATIENT
Start: 2018-03-15 | End: 2018-03-15

## 2018-03-15 RX ORDER — POLYETHYLENE GLYCOL 3350 17 G/17G
1 POWDER, FOR SOLUTION ORAL
Status: DISCONTINUED | OUTPATIENT
Start: 2018-03-15 | End: 2018-03-20 | Stop reason: HOSPADM

## 2018-03-15 RX ORDER — DIGOXIN 250 MCG
250 TABLET ORAL DAILY
Status: DISCONTINUED | OUTPATIENT
Start: 2018-03-16 | End: 2018-03-20 | Stop reason: HOSPADM

## 2018-03-15 RX ADMIN — VANCOMYCIN HYDROCHLORIDE 3000 MG: 100 INJECTION, POWDER, LYOPHILIZED, FOR SOLUTION INTRAVENOUS at 19:00

## 2018-03-15 RX ADMIN — METOPROLOL TARTRATE 100 MG: 100 TABLET, FILM COATED ORAL at 23:26

## 2018-03-15 RX ADMIN — RIVAROXABAN 20 MG: 20 TABLET, FILM COATED ORAL at 23:25

## 2018-03-15 RX ADMIN — SODIUM CHLORIDE: 9 INJECTION, SOLUTION INTRAVENOUS at 23:45

## 2018-03-15 RX ADMIN — CEFTRIAXONE 2 G: 2 INJECTION, POWDER, FOR SOLUTION INTRAMUSCULAR; INTRAVENOUS at 18:00

## 2018-03-15 RX ADMIN — ACETAMINOPHEN 650 MG: 325 TABLET, FILM COATED ORAL at 23:25

## 2018-03-15 RX ADMIN — PRAVASTATIN SODIUM 20 MG: 10 TABLET ORAL at 23:25

## 2018-03-15 RX ADMIN — FAMOTIDINE 20 MG: 20 TABLET, FILM COATED ORAL at 23:25

## 2018-03-15 ASSESSMENT — PATIENT HEALTH QUESTIONNAIRE - PHQ9
SUM OF ALL RESPONSES TO PHQ9 QUESTIONS 1 AND 2: 0
2. FEELING DOWN, DEPRESSED, IRRITABLE, OR HOPELESS: NOT AT ALL
1. LITTLE INTEREST OR PLEASURE IN DOING THINGS: NOT AT ALL
SUM OF ALL RESPONSES TO PHQ QUESTIONS 1-9: 0

## 2018-03-15 ASSESSMENT — ENCOUNTER SYMPTOMS
VOMITING: NO
FEVER: 0
NAUSEA: NO

## 2018-03-15 ASSESSMENT — CHA2DS2 SCORE
CHF OR LEFT VENTRICULAR DYSFUNCTION: NO
VASCULAR DISEASE: NO
CHA2DS2 VASC SCORE: 2
DIABETES: YES
HYPERTENSION: YES
PRIOR STROKE OR TIA OR THROMBOEMBOLISM: NO
SEX: MALE
AGE 65 TO 74: NO
AGE 75 OR GREATER: NO

## 2018-03-15 ASSESSMENT — PAIN SCALES - GENERAL
PAINLEVEL_OUTOF10: 8
PAINLEVEL_OUTOF10: 6
PAINLEVEL_OUTOF10: 6

## 2018-03-15 ASSESSMENT — LIFESTYLE VARIABLES
EVER_SMOKED: NEVER
DO YOU DRINK ALCOHOL: NO

## 2018-03-15 NOTE — ED TRIAGE NOTES
Chief Complaint   Patient presents with   • Wound Infection     Ambulatory to triage, had sx 3 weeks ago for debridement of R foot wound, pt has been seen by home health RN, told to come to ER for foot swelling/redness/discharge around surgical incision.     /90   Pulse 78   Temp 36.8 °C (98.2 °F) (Temporal)   Resp 14   Ht 1.829 m (6')   Wt (!) 133.4 kg (294 lb)   SpO2 99%   BMI 39.87 kg/m²     Pt Informed regarding triage process and verbalized understanding to inform triage tech or RN for any changes in condition.  Placed in lobby.

## 2018-03-15 NOTE — ED PROVIDER NOTES
ED Provider Note    Scribed for Brian Courtney M.D. by Eric Peña. 3/15/2018, 4:18 PM.    Primary care provider: Jerad Lomax M.D.  Means of arrival: Walk-in  History obtained from: Patient  History limited by: None    CHIEF COMPLAINT  Chief Complaint   Patient presents with   • Wound Infection       HPI  Sebastián Castro is a 53 y.o. male who presents to the Emergency Department for evaluation of a wound located on his right foot. On 2/19/2018 Dr. Delong, Orthopedics, performed an irrigation and debridement to the area proximal to his right toe amputation site. The wound was well-appearing over the last month. Three days ago his home health nurse examined his wound and it was well-appearing. Upon examination today, his home health noticed that his wound site was red and hot to touch. Patient reports that the wound site is painful. He reports associated left leg swelling. He denies fever. The patient reports a history of diabetes mellitus, hypertension, and hyperlipidemia. Patient is taking Xarelto, metoprolol, lisinopril, and metformin. Patient is allergic to penicillin. Patient denies smoking cigarettes or consuming alcohol.    REVIEW OF SYSTEMS  Review of Systems   Constitutional: Negative for fever.   Cardiovascular: Positive for leg swelling.   Skin:        Positive for redness.  Positive for warmth.  Positive for painful wound.   All other systems reviewed and are negative.  C    PAST MEDICAL HISTORY   has a past medical history of A-fib (CMS-[facility]); Chest pain; Diabetes (CMS-[facility]); Diabetic neuropathy (CMS-[facility]); Hypercholesterolemia; Hypertension; Morbid obesity (CMS-[facility]); Noncompliance; and Normal cardiac stress test.    SURGICAL HISTORY   has a past surgical history that includes toe amputation (Right, 11/10/2017); toe amputation (Right, 1/17/2018); and irrigation & debridement ortho (Right, 2/19/2018).    SOCIAL HISTORY  Social History   Substance Use Topics   • Smoking status: Never Smoker   •  Smokeless tobacco: Never Used   • Alcohol use No      History   Drug Use No       FAMILY HISTORY  History reviewed. No pertinent family history.    CURRENT MEDICATIONS  No current facility-administered medications on file prior to encounter.      Current Outpatient Prescriptions on File Prior to Encounter   Medication Sig Dispense Refill   • digoxin (LANOXIN) 250 MCG Tab Take 1 Tab by mouth every day. 30 Tab 3   • rivaroxaban (XARELTO) 20 MG Tab tablet Take 1 Tab by mouth with dinner. 30 Tab 0   • famotidine (PEPCID) 20 MG Tab Take 1 Tab by mouth 2 times a day. 60 Tab 3   • aspirin 81 MG EC tablet Take 1 Tab by mouth every day. 30 Tab 3   • metFORMIN (GLUCOPHAGE) 500 MG Tab Take 2 Tabs by mouth 2 times a day, with meals. 60 Tab 0   • pravastatin (PRAVACHOL) 20 MG Tab Take 1 Tab by mouth every evening. 30 Tab 11   • lisinopril (PRINIVIL, ZESTRIL) 40 MG tablet Take 1 Tab by mouth every day. 30 Tab 3   • amLODIPine (NORVASC) 5 MG Tab Take 1 Tab by mouth every day. 30 Tab 3   • metoprolol (LOPRESSOR) 100 MG Tab Take 1 Tab by mouth 2 times a day. 135 Tab 3   • acetaminophen (TYLENOL) 325 MG Tab Take 2 Tabs by mouth every 6 hours as needed (Mild Pain; (Pain scale 1-3); Temp greater than 100.5 F). 30 Tab 0         ALLERGIES  Allergies   Allergen Reactions   • Daptomycin Rash     Body rash  RXN=1/2018     • Pcn [Penicillins] Hives and Rash      Rash & hives  Tolerating Ampicillin + Sulbactam 11/12/17  RXN=since a kid   • Unasyn [Ampicillin-Sulbactam Sodium] Hives, Itching and Swelling   • Vancomycin Rash     Red man syndrome       PHYSICAL EXAM  VITAL SIGNS: /90   Pulse 78   Temp 36.8 °C (98.2 °F) (Temporal)   Resp 14   Ht 1.829 m (6')   Wt (!) 133.4 kg (294 lb)   SpO2 99%   BMI 39.87 kg/m²   Vitals reviewed.  Constitutional: Awake, alert, chronically ill-appearing, no acute distress.  HENT: Normocephalic, Atraumatic, Bilateral external ears normal, Oropharynx moist, No oral exudates, Nose normal.   Eyes:  PERRL, EOMI, Conjunctiva normal, No discharge.   Neck: Normal range of motion, No tenderness, Supple, No stridor.   Cardiovascular: Normal heart rate, Normal rhythm, No murmurs, No rubs, No gallops.   Thorax & Lungs: Normal breath sounds, No respiratory distress, No wheezing,  Abdomen: Bowel sounds normal, Soft, No tenderness  Skin: Warm, Dry,No rash. Several small lesions to the lateral aspect of the right foot.  Erythema over the dorsum of the foot.  Neurosurgical site with tenderness.  No crepitus.  Back: No tenderness, No CVA tenderness.   Musculoskeletal: Good range of motion in all major joints. Moderate edema of the right lower extremity. No tenderness to palpation or major deformities noted.   Neurologic: Alert, Normal motor function, Normal sensory function, No focal deficits noted.   Psychiatric: Affect normal    LABS  Results for orders placed or performed during the hospital encounter of 03/15/18   CBC WITH DIFFERENTIAL   Result Value Ref Range    WBC 13.3 (H) 4.8 - 10.8 K/uL    RBC 4.90 4.70 - 6.10 M/uL    Hemoglobin 14.1 14.0 - 18.0 g/dL    Hematocrit 42.2 42.0 - 52.0 %    MCV 86.1 81.4 - 97.8 fL    MCH 28.8 27.0 - 33.0 pg    MCHC 33.4 (L) 33.7 - 35.3 g/dL    RDW 46.2 35.9 - 50.0 fL    Platelet Count 310 164 - 446 K/uL    MPV 11.2 9.0 - 12.9 fL    Neutrophils-Polys 66.00 44.00 - 72.00 %    Lymphocytes 23.10 22.00 - 41.00 %    Monocytes 5.60 0.00 - 13.40 %    Eosinophils 4.40 0.00 - 6.90 %    Basophils 0.40 0.00 - 1.80 %    Immature Granulocytes 0.50 0.00 - 0.90 %    Nucleated RBC 0.00 /100 WBC    Neutrophils (Absolute) 8.82 (H) 1.82 - 7.42 K/uL    Lymphs (Absolute) 3.08 1.00 - 4.80 K/uL    Monos (Absolute) 0.74 0.00 - 0.85 K/uL    Eos (Absolute) 0.58 (H) 0.00 - 0.51 K/uL    Baso (Absolute) 0.05 0.00 - 0.12 K/uL    Immature Granulocytes (abs) 0.06 0.00 - 0.11 K/uL    NRBC (Absolute) 0.00 K/uL   COMP METABOLIC PANEL   Result Value Ref Range    Sodium 135 135 - 145 mmol/L    Potassium 4.4 3.6 - 5.5 mmol/L     Chloride 99 96 - 112 mmol/L    Co2 27 20 - 33 mmol/L    Anion Gap 9.0 0.0 - 11.9    Glucose 169 (H) 65 - 99 mg/dL    Bun 16 8 - 22 mg/dL    Creatinine 0.71 0.50 - 1.40 mg/dL    Calcium 9.3 8.5 - 10.5 mg/dL    AST(SGOT) 38 12 - 45 U/L    ALT(SGPT) 42 2 - 50 U/L    Alkaline Phosphatase 84 30 - 99 U/L    Total Bilirubin 0.7 0.1 - 1.5 mg/dL    Albumin 4.0 3.2 - 4.9 g/dL    Total Protein 7.5 6.0 - 8.2 g/dL    Globulin 3.5 1.9 - 3.5 g/dL    A-G Ratio 1.1 g/dL   LACTIC ACID   Result Value Ref Range    Lactic Acid 2.0 0.5 - 2.0 mmol/L   WESTERGREN SED RATE   Result Value Ref Range    Sed Rate Westergren 35 (H) 0 - 20 mm/hour   CRP QUANTITIVE (NON-CARDIAC)   Result Value Ref Range    Stat C-Reactive Protein 1.68 (H) 0.00 - 0.75 mg/dL   ESTIMATED GFR   Result Value Ref Range    GFR If African American >60 >60 mL/min/1.73 m 2    GFR If Non African American >60 >60 mL/min/1.73 m 2      All labs reviewed by me.    RADIOLOGY  DX-FOOT-COMPLETE 3+ RIGHT   Final Result      1.  No plain film evidence of right foot osteomyelitis.      2.  Diffuse soft tissue swelling.      3.  Amputation of the right 1st ray at the level of the proximal metaphysis of the 1st metatarsal.        The radiologist's interpretation of all radiological studies have been reviewed by me.    COURSE & MEDICAL DECISION MAKING  Pertinent Labs & Imaging studies reviewed. (See chart for details)        4:18 PM Patient seen and examined at bedside. The patient presents with wound infection, and the differential diagnosis includes but is not limited to cellulitis vs sepsis vs osteomyelitis. Ordered for DX foot complete 3+ right, westergren sed rate, CRP quantitive, CBC with differential, CMP, Lactic acid, and Blood cultures x2 to evaluate.    4:55 PM I discussed the patient's case and the above findings with the Orthopedics PA who agrees to consult with orthopedics.    With the pharmacist regarding antibiotic recommendation.  Antibiotic sed rate  ordered.    Orthopedist will see the patient.  The patient is admitted to the hospitalist for continued workup and treatment.    DISPOSITION:  Patient will be admitted to Dr. Martinez, Hospitalist, in guarded condition.    FINAL IMPRESSION  1. Foot infection    2. Diabetic foot infection (CMS-[facility])          Eric CLARKE (Scribe), am scribing for, and in the presence of, Brian Courtney M.D..    Electronically signed by: Eric Peña (Scribe), 3/15/2018    Brian CLARKE M.D. personally performed the services described in this documentation, as scribed by Eric Peña in my presence, and it is both accurate and complete.    The note accurately reflects work and decisions made by me.  Brian Courtney  3/15/2018  7:03 PM

## 2018-03-16 PROBLEM — R11.2 N&V (NAUSEA AND VOMITING): Status: ACTIVE | Noted: 2018-03-16

## 2018-03-16 LAB
ANION GAP SERPL CALC-SCNC: 9 MMOL/L (ref 0–11.9)
APPEARANCE UR: ABNORMAL
BACTERIA #/AREA URNS HPF: NEGATIVE /HPF
BILIRUB UR QL STRIP.AUTO: NEGATIVE
BUN SERPL-MCNC: 13 MG/DL (ref 8–22)
CALCIUM SERPL-MCNC: 8.5 MG/DL (ref 8.5–10.5)
CHLORIDE SERPL-SCNC: 104 MMOL/L (ref 96–112)
CO2 SERPL-SCNC: 24 MMOL/L (ref 20–33)
COLOR UR: YELLOW
CREAT SERPL-MCNC: 0.71 MG/DL (ref 0.5–1.4)
DIGOXIN SERPL-MCNC: 0.6 NG/ML (ref 0.8–2)
EPI CELLS #/AREA URNS HPF: ABNORMAL /HPF
ERYTHROCYTE [DISTWIDTH] IN BLOOD BY AUTOMATED COUNT: 44.6 FL (ref 35.9–50)
GLUCOSE BLD-MCNC: 155 MG/DL (ref 65–99)
GLUCOSE BLD-MCNC: 161 MG/DL (ref 65–99)
GLUCOSE BLD-MCNC: 169 MG/DL (ref 65–99)
GLUCOSE BLD-MCNC: 170 MG/DL (ref 65–99)
GLUCOSE BLD-MCNC: 170 MG/DL (ref 65–99)
GLUCOSE BLD-MCNC: 172 MG/DL (ref 65–99)
GLUCOSE SERPL-MCNC: 168 MG/DL (ref 65–99)
GLUCOSE UR STRIP.AUTO-MCNC: NEGATIVE MG/DL
HCT VFR BLD AUTO: 37.6 % (ref 42–52)
HGB BLD-MCNC: 12.4 G/DL (ref 14–18)
HYALINE CASTS #/AREA URNS LPF: ABNORMAL /LPF
KETONES UR STRIP.AUTO-MCNC: NEGATIVE MG/DL
LEUKOCYTE ESTERASE UR QL STRIP.AUTO: NEGATIVE
MCH RBC QN AUTO: 28.1 PG (ref 27–33)
MCHC RBC AUTO-ENTMCNC: 33 G/DL (ref 33.7–35.3)
MCV RBC AUTO: 85.1 FL (ref 81.4–97.8)
MICRO URNS: ABNORMAL
NITRITE UR QL STRIP.AUTO: NEGATIVE
PH UR STRIP.AUTO: 5.5 [PH]
PLATELET # BLD AUTO: 276 K/UL (ref 164–446)
PMV BLD AUTO: 11.1 FL (ref 9–12.9)
POTASSIUM SERPL-SCNC: 3.3 MMOL/L (ref 3.6–5.5)
PROT UR QL STRIP: 30 MG/DL
RBC # BLD AUTO: 4.42 M/UL (ref 4.7–6.1)
RBC # URNS HPF: ABNORMAL /HPF
RBC UR QL AUTO: NEGATIVE
SODIUM SERPL-SCNC: 137 MMOL/L (ref 135–145)
SP GR UR STRIP.AUTO: 1.02
UROBILINOGEN UR STRIP.AUTO-MCNC: 1 MG/DL
WBC # BLD AUTO: 12.1 K/UL (ref 4.8–10.8)
WBC #/AREA URNS HPF: ABNORMAL /HPF

## 2018-03-16 PROCEDURE — 99285 EMERGENCY DEPT VISIT HI MDM: CPT

## 2018-03-16 PROCEDURE — A6209 FOAM DRSG <=16 SQ IN W/O BDR: HCPCS | Performed by: NURSE PRACTITIONER

## 2018-03-16 PROCEDURE — 700111 HCHG RX REV CODE 636 W/ 250 OVERRIDE (IP): Performed by: HOSPITALIST

## 2018-03-16 PROCEDURE — 80048 BASIC METABOLIC PNL TOTAL CA: CPT

## 2018-03-16 PROCEDURE — 770001 HCHG ROOM/CARE - MED/SURG/GYN PRIV*

## 2018-03-16 PROCEDURE — 87077 CULTURE AEROBIC IDENTIFY: CPT

## 2018-03-16 PROCEDURE — 85027 COMPLETE CBC AUTOMATED: CPT

## 2018-03-16 PROCEDURE — 87205 SMEAR GRAM STAIN: CPT

## 2018-03-16 PROCEDURE — 700105 HCHG RX REV CODE 258: Performed by: INTERNAL MEDICINE

## 2018-03-16 PROCEDURE — 81001 URINALYSIS AUTO W/SCOPE: CPT

## 2018-03-16 PROCEDURE — 36415 COLL VENOUS BLD VENIPUNCTURE: CPT

## 2018-03-16 PROCEDURE — 96366 THER/PROPH/DIAG IV INF ADDON: CPT

## 2018-03-16 PROCEDURE — 87186 SC STD MICRODIL/AGAR DIL: CPT

## 2018-03-16 PROCEDURE — 96375 TX/PRO/DX INJ NEW DRUG ADDON: CPT

## 2018-03-16 PROCEDURE — 99233 SBSQ HOSP IP/OBS HIGH 50: CPT | Performed by: HOSPITALIST

## 2018-03-16 PROCEDURE — 700102 HCHG RX REV CODE 250 W/ 637 OVERRIDE(OP): Performed by: HOSPITALIST

## 2018-03-16 PROCEDURE — 87070 CULTURE OTHR SPECIMN AEROBIC: CPT

## 2018-03-16 PROCEDURE — A9270 NON-COVERED ITEM OR SERVICE: HCPCS | Performed by: INTERNAL MEDICINE

## 2018-03-16 PROCEDURE — 82962 GLUCOSE BLOOD TEST: CPT

## 2018-03-16 PROCEDURE — 700111 HCHG RX REV CODE 636 W/ 250 OVERRIDE (IP): Performed by: INTERNAL MEDICINE

## 2018-03-16 PROCEDURE — 80162 ASSAY OF DIGOXIN TOTAL: CPT

## 2018-03-16 PROCEDURE — 700102 HCHG RX REV CODE 250 W/ 637 OVERRIDE(OP): Performed by: INTERNAL MEDICINE

## 2018-03-16 PROCEDURE — 96365 THER/PROPH/DIAG IV INF INIT: CPT

## 2018-03-16 PROCEDURE — A9270 NON-COVERED ITEM OR SERVICE: HCPCS | Performed by: HOSPITALIST

## 2018-03-16 RX ORDER — ONDANSETRON 2 MG/ML
4 INJECTION INTRAMUSCULAR; INTRAVENOUS EVERY 4 HOURS PRN
Status: DISCONTINUED | OUTPATIENT
Start: 2018-03-16 | End: 2018-03-20 | Stop reason: HOSPADM

## 2018-03-16 RX ORDER — METOPROLOL TARTRATE 100 MG/1
100 TABLET ORAL EVERY 8 HOURS
Status: DISCONTINUED | OUTPATIENT
Start: 2018-03-16 | End: 2018-03-20 | Stop reason: HOSPADM

## 2018-03-16 RX ORDER — DIPHENHYDRAMINE HYDROCHLORIDE 50 MG/ML
25 INJECTION INTRAMUSCULAR; INTRAVENOUS 2 TIMES DAILY PRN
Status: DISCONTINUED | OUTPATIENT
Start: 2018-03-16 | End: 2018-03-20 | Stop reason: HOSPADM

## 2018-03-16 RX ORDER — POTASSIUM CHLORIDE 20 MEQ/1
20 TABLET, EXTENDED RELEASE ORAL DAILY
Status: DISCONTINUED | OUTPATIENT
Start: 2018-03-16 | End: 2018-03-20 | Stop reason: HOSPADM

## 2018-03-16 RX ORDER — AMLODIPINE BESYLATE 10 MG/1
10 TABLET ORAL DAILY
Status: DISCONTINUED | OUTPATIENT
Start: 2018-03-16 | End: 2018-03-20 | Stop reason: HOSPADM

## 2018-03-16 RX ORDER — ACETAMINOPHEN 325 MG/1
650 TABLET ORAL EVERY 6 HOURS PRN
Status: DISCONTINUED | OUTPATIENT
Start: 2018-03-16 | End: 2018-03-20 | Stop reason: HOSPADM

## 2018-03-16 RX ORDER — METOCLOPRAMIDE HYDROCHLORIDE 5 MG/5ML
10 SOLUTION ORAL
Status: DISCONTINUED | OUTPATIENT
Start: 2018-03-16 | End: 2018-03-18

## 2018-03-16 RX ADMIN — CEFTRIAXONE 2 G: 2 INJECTION, POWDER, FOR SOLUTION INTRAMUSCULAR; INTRAVENOUS at 08:38

## 2018-03-16 RX ADMIN — FAMOTIDINE 20 MG: 20 TABLET, FILM COATED ORAL at 20:40

## 2018-03-16 RX ADMIN — INSULIN HUMAN 1 UNITS: 100 INJECTION, SOLUTION PARENTERAL at 05:47

## 2018-03-16 RX ADMIN — SODIUM CHLORIDE: 9 INJECTION, SOLUTION INTRAVENOUS at 13:12

## 2018-03-16 RX ADMIN — METOCLOPRAMIDE HYDROCHLORIDE 10 MG: 5 SOLUTION ORAL at 17:27

## 2018-03-16 RX ADMIN — ACETAMINOPHEN 650 MG: 325 TABLET, FILM COATED ORAL at 15:49

## 2018-03-16 RX ADMIN — POTASSIUM CHLORIDE 20 MEQ: 1500 TABLET, EXTENDED RELEASE ORAL at 16:35

## 2018-03-16 RX ADMIN — METOPROLOL TARTRATE 100 MG: 100 TABLET, FILM COATED ORAL at 05:24

## 2018-03-16 RX ADMIN — VANCOMYCIN HYDROCHLORIDE 2000 MG: 100 INJECTION, POWDER, LYOPHILIZED, FOR SOLUTION INTRAVENOUS at 17:00

## 2018-03-16 RX ADMIN — INSULIN HUMAN 1 UNITS: 100 INJECTION, SOLUTION PARENTERAL at 16:36

## 2018-03-16 RX ADMIN — LACTOBACILLUS ACIDOPHILUS / LACTOBACILLUS BULGARICUS 1 PACKET: 100 MILLION CFU STRENGTH GRANULES at 16:35

## 2018-03-16 RX ADMIN — PRAVASTATIN SODIUM 20 MG: 10 TABLET ORAL at 20:39

## 2018-03-16 RX ADMIN — LACTOBACILLUS ACIDOPHILUS / LACTOBACILLUS BULGARICUS 1 PACKET: 100 MILLION CFU STRENGTH GRANULES at 08:38

## 2018-03-16 RX ADMIN — ONDANSETRON HYDROCHLORIDE 4 MG: 2 INJECTION, SOLUTION INTRAMUSCULAR; INTRAVENOUS at 11:59

## 2018-03-16 RX ADMIN — DIPHENHYDRAMINE HYDROCHLORIDE 25 MG: 50 INJECTION, SOLUTION INTRAMUSCULAR; INTRAVENOUS at 16:58

## 2018-03-16 RX ADMIN — RIVAROXABAN 20 MG: 20 TABLET, FILM COATED ORAL at 16:35

## 2018-03-16 RX ADMIN — METOPROLOL TARTRATE 100 MG: 100 TABLET, FILM COATED ORAL at 20:39

## 2018-03-16 RX ADMIN — INSULIN HUMAN 1 UNITS: 100 INJECTION, SOLUTION PARENTERAL at 10:46

## 2018-03-16 RX ADMIN — INSULIN HUMAN 1 UNITS: 100 INJECTION, SOLUTION PARENTERAL at 20:42

## 2018-03-16 RX ADMIN — ONDANSETRON HYDROCHLORIDE 4 MG: 2 INJECTION, SOLUTION INTRAMUSCULAR; INTRAVENOUS at 05:14

## 2018-03-16 RX ADMIN — AMLODIPINE BESYLATE 10 MG: 10 TABLET ORAL at 08:38

## 2018-03-16 RX ADMIN — INSULIN HUMAN 1 UNITS: 100 INJECTION, SOLUTION PARENTERAL at 01:26

## 2018-03-16 RX ADMIN — METOPROLOL TARTRATE 100 MG: 100 TABLET, FILM COATED ORAL at 14:46

## 2018-03-16 RX ADMIN — DIGOXIN 250 MCG: 250 TABLET ORAL at 08:38

## 2018-03-16 RX ADMIN — LACTOBACILLUS ACIDOPHILUS / LACTOBACILLUS BULGARICUS 1 PACKET: 100 MILLION CFU STRENGTH GRANULES at 10:45

## 2018-03-16 RX ADMIN — LISINOPRIL 40 MG: 20 TABLET ORAL at 08:38

## 2018-03-16 RX ADMIN — FAMOTIDINE 20 MG: 20 TABLET, FILM COATED ORAL at 08:37

## 2018-03-16 RX ADMIN — ASPIRIN 81 MG: 81 TABLET, COATED ORAL at 08:37

## 2018-03-16 ASSESSMENT — ENCOUNTER SYMPTOMS
HALLUCINATIONS: 0
WEAKNESS: 1
DIZZINESS: 0
EYE REDNESS: 0
SHORTNESS OF BREATH: 0
VOMITING: 1
VOMITING: 0
TREMORS: 0
DIAPHORESIS: 0
CONSTIPATION: 0
FALLS: 0
NAUSEA: 0
NECK PAIN: 0
MYALGIAS: 1
LOSS OF CONSCIOUSNESS: 0
FLANK PAIN: 0
EYE DISCHARGE: 0
CHILLS: 1
STRIDOR: 0
COUGH: 0
SPEECH CHANGE: 0
WHEEZING: 0
CHILLS: 0
BACK PAIN: 0
NAUSEA: 1
SENSORY CHANGE: 0
EYE PAIN: 0
HEADACHES: 0
PALPITATIONS: 0
BLOOD IN STOOL: 0
HEMOPTYSIS: 0
ABDOMINAL PAIN: 0
INSOMNIA: 0
FEVER: 0
SEIZURES: 0
DIARRHEA: 0
FOCAL WEAKNESS: 0
NERVOUS/ANXIOUS: 0

## 2018-03-16 ASSESSMENT — PAIN SCALES - GENERAL
PAINLEVEL_OUTOF10: 5

## 2018-03-16 ASSESSMENT — LIFESTYLE VARIABLES: SUBSTANCE_ABUSE: 0

## 2018-03-16 NOTE — DISCHARGE PLANNING
Care Transition Team Assessment    Pt lives in a one story home with three roommates.  There are two steps to get into the home. Pt lost his job in November of 2017 and has been unable to get another job because of wound on pt's foot.  SW will provide food pantry resources - pt refused all other resources.      Information Source  Orientation : Oriented x 4  Information Given By: Patient  Informant's Name: Sebastián Castro  Who is responsible for making decisions for patient? : Patient    Readmission Evaluation  Is this a readmission?: Yes - unplanned readmission  Why do you think you were readmitted?:  (Infection returned)  Was an appointment arranged for you prior to discharge?: Yes, attended appointment  Did you understand your discharge instructions?: Yes  Did you have enough support after your last discharge?: Yes    Elopement Risk  Legal Hold: No  Ambulatory or Self Mobile in Wheelchair: Yes  Disoriented: No  Psychiatric Symptoms: None  History of Wandering: No  Elopement this Admit: No  Vocalizing Wanting to Leave: No  Displays Behaviors, Body Language Wanting to Leave: No-Not at Risk for Elopement  Elopement Risk: Not at Risk for Elopement    Interdisciplinary Discharge Planning  Does Admitting Nurse Feel This Could be a Complex Discharge?: No  Primary Care Physician: Gregory ([facility])  Lives with - Patient's Self Care Capacity: Friends  Patient or legal guardian wants to designate a caregiver (see row info): No  Support Systems: Friends / Neighbors  Housing / Facility: 1 Story House  Do You Take your Prescribed Medications Regularly: Yes  Able to Return to Previous ADL's: Yes  Mobility Issues: Yes  Prior Services: Skilled Home Health Services  Patient Expects to be Discharged to:: Home  Assistance Needed: No  Durable Medical Equipment: Walker (Offloading boot and shoe)    Discharge Preparedness  What is your plan after discharge?: Home health care  What are your discharge supports?: Child  Prior Functional Level:  Independent with Activities of Daily Living    Functional Assesment  Prior Functional Level: Independent with Activities of Daily Living    Finances  Financial Barriers to Discharge: Yes  Average Monthly Income: 0 $  Source of Income: None  Prescription Coverage: Yes    Vision / Hearing Impairment  Vision Impairment : No  Hearing Impairment : No    Values / Beliefs / Concerns  Values / Beliefs Concerns : No  Special Hospitalization Concerns: None at this moment    Advance Directive  Advance Directive?: DPOA for Health Care  Durable Power of  Name and Contact : Freddie Castro    Domestic Abuse  Have you ever been the victim of abuse or violence?: No  Physical Abuse or Sexual Abuse: No  Verbal Abuse or Emotional Abuse: No  Possible Abuse Reported to:: Not Applicable    Psychological Assessment  History of Substance Abuse: None  History of Psychiatric Problems: No    Discharge Risks or Barriers  Discharge risks or barriers?: Complex medical needs    Anticipated Discharge Information  Anticipated discharge disposition: [facility]

## 2018-03-16 NOTE — PROGRESS NOTES
Pt received from [facility] at 2300.  Pt is A&O x 4.  Reports a 8/10 R knee pain, PRN pain medication given per MAR.   R foot wound CHARLEY; wound photo uploaded.   PIV patent, running continuous fluids.  SCDs in place.  Admit profile completed.  Pt oriented to room.  POC discussed.  All needs met at this time.  Bed in low position.  Call light within reach.  Rounding in place.     2 RN skin check with Lena PÉREZ: R foot diabetic ulcer POA, CHARLEY. Scattered scratches on R shin. Scant redness to pt's back; blanching. All other skin is intact.

## 2018-03-16 NOTE — PROGRESS NOTES
[facility] Hospitalist Progress Note    Date of Service: 3/16/2018    Chief Complaint  53 y.o. male hx right diabetic infxn, receiving home health wound care , DM, Hypertension, afib on Xarelto  admitted 3/15/2018     Interval Problem Update  afeb on IV atbs.  Hypertensive. Pain uncontrolled . Recurrent N/V.     Consultants/Specialty  Orthopedics.     Disposition  tbd        Review of Systems   Constitutional: Negative for chills, diaphoresis, fever and malaise/fatigue.   HENT: Negative for congestion.    Eyes: Negative for discharge and redness.   Respiratory: Negative for cough, shortness of breath, wheezing and stridor.    Cardiovascular: Positive for leg swelling (chronic ). Negative for chest pain and palpitations.   Gastrointestinal: Positive for nausea and vomiting. Negative for abdominal pain and diarrhea.   Genitourinary: Negative for flank pain and hematuria.   Musculoskeletal: Positive for joint pain. Negative for back pain and neck pain.   Neurological: Positive for weakness. Negative for dizziness, tremors, sensory change, speech change and focal weakness.   Psychiatric/Behavioral: Negative for hallucinations and substance abuse.      Physical Exam  Laboratory/Imaging   Hemodynamics  Temp (24hrs), Av.3 °C (97.4 °F), Min:35.9 °C (96.6 °F), Max:36.9 °C (98.4 °F)   Temperature: 36.1 °C (96.9 °F)  Pulse  Av.9  Min: 66  Max: 108    Blood Pressure: 117/63, NIBP: (!) 166/101      Respiratory      Respiration: 18, Pulse Oximetry: 91 %     Work Of Breathing / Effort: Mild  RUL Breath Sounds: Clear, RML Breath Sounds: Diminished, RLL Breath Sounds: Diminished, ELIAS Breath Sounds: Clear, LLL Breath Sounds: Diminished    Fluids    Intake/Output Summary (Last 24 hours) at 18 1527  Last data filed at 18 1100   Gross per 24 hour   Intake              448 ml   Output              700 ml   Net             -252 ml       Nutrition  Orders Placed This Encounter   Procedures   • Diet Order     Standing  Status:   Standing     Number of Occurrences:   1     Order Specific Question:   Diet:     Answer:   Diabetic [3]     Physical Exam   Constitutional: He is oriented to person, place, and time. No distress.   HENT:   Head: Normocephalic and atraumatic.   Nose: Nose normal.   Eyes: Conjunctivae and EOM are normal. No scleral icterus.   Neck: Normal range of motion. No JVD present.   Cardiovascular: Normal rate and regular rhythm.    No murmur heard.  Pulmonary/Chest: Effort normal. No stridor. No respiratory distress. He has no wheezes. He has no rales.   Abdominal: Soft. Bowel sounds are normal. He exhibits no distension. There is no tenderness.   Obese.    Musculoskeletal: He exhibits edema and tenderness.   Right foot medial surgical site wound dressed. Great toe amputated   Mod swelling    Neurological: He is alert and oriented to person, place, and time. No cranial nerve deficit.   Skin: Skin is warm and dry. He is not diaphoretic. No pallor.   Psychiatric: He has a normal mood and affect. His behavior is normal.   Vitals reviewed.      Recent Labs      03/15/18   1710  03/16/18   0345   WBC  13.3*  12.1*   RBC  4.90  4.42*   HEMOGLOBIN  14.1  12.4*   HEMATOCRIT  42.2  37.6*   MCV  86.1  85.1   MCH  28.8  28.1   MCHC  33.4*  33.0*   RDW  46.2  44.6   PLATELETCT  310  276   MPV  11.2  11.1     Recent Labs      03/15/18   1710  03/16/18   0345   SODIUM  135  137   POTASSIUM  4.4  3.3*   CHLORIDE  99  104   CO2  27  24   GLUCOSE  169*  168*   BUN  16  13   CREATININE  0.71  0.71   CALCIUM  9.3  8.5                      Assessment/Plan     * Diabetic infection of right foot (CMS-HCC)- (present on admission)   Assessment & Plan    IV atbs- vanco rocephin  Hx of redmans synd- pre med with IV benadryl and tylenol, reduce rate of infusion .  Wound care  Pain control prn tylenol-- add ultram  Ortho to assess, may need I+D  Check wound cx.          N&V (nausea and vomiting)   Assessment & Plan    Possible GPS, abd exam  benign  Continue tighten glycemic control  Prn zofran   Add reglan.   Xray abd if worsens.         Type 2 diabetes mellitus (CMS-HCC)- (present on admission)   Assessment & Plan    w hyperglycemia  SSI, ada diet.          HTN (hypertension)- (present on admission)   Assessment & Plan    conntrolled  cw amlodipine, metoprolol  Monitor bp        Atrial fibrillation (CMS-[facility])- (present on admission)   Assessment & Plan    Chronic atrial fibrillation rate currently mild tachycardia on BB and digoxin and on anticoagulation  cw lopressor  Continue Xarelto - may need to hold prior to surgery , if planned.         HLD (hyperlipidemia)- (present on admission)   Assessment & Plan    Continue statin        Obesity- (present on admission)   Assessment & Plan    Recommend weight loss and dietary modification.         Discussed w multi disciplinary team plan of care.   Quality-Core Measures    Discussed w med neph team, patient plan of care.

## 2018-03-16 NOTE — PROGRESS NOTES
Pharmacy Kinetics 53 y.o. male on vancomycin day # 1 3/16/2018    Currently on Vancomycin new start    Indication for Treatment: SSTI     Pertinent history per medical record: Admitted on 3/15/2018 for worsening wound infection.  Patient has a history of R foot wound, s/p I&D and partial amputation on 18. The wound was noted to be erythematous and warm to the touch on 3/15, prompting presentation to the ED. Antibiotics initiated for SSTI.     Other antibiotics: ceftriaxone 2 g IV q24h    Allergies: Daptomycin; Pcn [penicillins]; Unasyn [ampicillin-sulbactam sodium]; and Vancomycin     List concerns for renal function: BMI 40, BUN/SCr ratio > 20:1    Pertinent cultures to date:    R foot tissue: MRSA, candida albicans    R foot wound: MRSA, proteus     Recent Labs      03/15/18   1710   WBC  13.3*   NEUTSPOLYS  66.00     Recent Labs      03/15/18   1710   BUN  16   CREATININE  0.71   ALBUMIN  4.0      Blood pressure (!) 164/86, pulse 94, temperature 36.5 °C (97.7 °F), resp. rate 20, height 1.829 m (6'), weight (!) 133.4 kg (294 lb), SpO2 95 %. Temp (24hrs), Av.7 °C (98 °F), Min:36.5 °C (97.7 °F), Max:36.8 °C (98.2 °F)      A/P   1. Vancomycin dose change: initiate 2000 mg IV q24h  2. Next vancomycin level: ~3 days (not yet ordered)  3. Goal trough: 12-16 mcg/mL  4. Comments: new start vancomycin for SSTI. Patient has a history of MRSA in wound cultures. Ortho on board, would recommend ID consult as well. Renal indices at baseline, though CrCl likely overestimated given BMI. Last time patient received vancomycin, was supratherapeutic on q12h dosing - will start with 2000 mg IV q24h and plan for a level when closer to steady state. Patient has allergy listed to vancomycin - red man syndrome can be avoided with slower infusion rate, will run over 3 hours. Pharmacy will continue to follow.    Nallely Umanzor, PharmD

## 2018-03-16 NOTE — H&P
Hospital Medicine History and Physical    Date of Service  3/15/2018    Chief Complaint  Chief Complaint   Patient presents with   • Wound Infection       History of Presenting Illness  53 y.o. male who presented 3/15/2018 with Wound Infection  He has a history of diabetes and diabetic foot problems and had R great toe amputated. Dr. Delong, Orthopedics performed irrigation and debridement to the area proximal to his right toe amputation site. He was followed by [facility] and wound care at home. He then complained of chills and malaise. His [facility] wound nurse evaluated his foot and noted worsening erythema, swelling.  He has a history of atrial fibrillation and is on Xarelto.  At the ED, he is afebrile, hemodynamically stable. Xray did not show any evidence of osteomyelitis. Mild leukocytosis and ESR 35 on labs. Hyperglycemia. Eliseo REDDING, Orthopedics PA, Orthopedics consulted.  When I saw him at ED, no acute distress. Auscultation clear. R foot, R great toe amputation noted, incision area no discharge but there is surrounding erythema, swelling and mild tenderness.    Primary Care Physician  Jerad Lomax M.D.    Consultants  Orthopedics    Code Status  full    Review of Systems  Review of Systems   Constitutional: Positive for chills and malaise/fatigue. Negative for fever.   HENT: Negative for congestion, hearing loss and nosebleeds.    Eyes: Negative for pain and redness.   Respiratory: Negative for cough, hemoptysis, shortness of breath and wheezing.    Cardiovascular: Negative for chest pain and palpitations.   Gastrointestinal: Negative for abdominal pain, blood in stool, constipation, diarrhea, nausea and vomiting.   Genitourinary: Negative for dysuria, frequency and hematuria.   Musculoskeletal: Positive for joint pain and myalgias. Negative for falls.   Skin: Positive for rash (R foot cellulitis).   Neurological: Positive for weakness. Negative for dizziness, tremors, focal weakness, seizures, loss of consciousness and  headaches.   Psychiatric/Behavioral: The patient is not nervous/anxious and does not have insomnia.    All other systems reviewed and are negative.       Past Medical History  Past Medical History:   Diagnosis Date   • A-fib (CMS-HCC)    • Chest pain    • Diabetes (CMS-HCC)    • Diabetic neuropathy (CMS-HCC)    • Hypercholesterolemia    • Hypertension    • Morbid obesity (CMS-HCC)    • Noncompliance    • Normal cardiac stress test        Surgical History  Past Surgical History:   Procedure Laterality Date   • IRRIGATION & DEBRIDEMENT ORTHO Right 2/19/2018    Procedure: IRRIGATION & DEBRIDEMENT ORTHO-FOOT;  Surgeon: Kirby Lopez M.D.;  Location: SURGERY [facility];  Service: Orthopedics   • TOE AMPUTATION Right 1/17/2018    Procedure: TOE AMPUTATION-1ST RAY;  Surgeon: Doug Delong M.D.;  Location: SURGERY [facility];  Service: Orthopedics   • TOE AMPUTATION Right 11/10/2017    Procedure: TOE AMPUTATION;  Surgeon: Osorio Leo M.D.;  Location: SURGERY [facility];  Service: Orthopedics       Medications  No current facility-administered medications on file prior to encounter.      Current Outpatient Prescriptions on File Prior to Encounter   Medication Sig Dispense Refill   • digoxin (LANOXIN) 250 MCG Tab Take 1 Tab by mouth every day. 30 Tab 3   • rivaroxaban (XARELTO) 20 MG Tab tablet Take 1 Tab by mouth with dinner. 30 Tab 0   • famotidine (PEPCID) 20 MG Tab Take 1 Tab by mouth 2 times a day. 60 Tab 3   • aspirin 81 MG EC tablet Take 1 Tab by mouth every day. 30 Tab 3   • metFORMIN (GLUCOPHAGE) 500 MG Tab Take 2 Tabs by mouth 2 times a day, with meals. 60 Tab 0   • pravastatin (PRAVACHOL) 20 MG Tab Take 1 Tab by mouth every evening. 30 Tab 11   • lisinopril (PRINIVIL, ZESTRIL) 40 MG tablet Take 1 Tab by mouth every day. 30 Tab 3   • amLODIPine (NORVASC) 5 MG Tab Take 1 Tab by mouth every day. 30 Tab 3   • metoprolol (LOPRESSOR) 100 MG Tab Take 1 Tab by mouth 2 times a day. 135 Tab 3   •  acetaminophen (TYLENOL) 325 MG Tab Take 2 Tabs by mouth every 6 hours as needed (Mild Pain; (Pain scale 1-3); Temp greater than 100.5 F). 30 Tab 0       Family History  History reviewed. No pertinent family history.    Social History  Social History   Substance Use Topics   • Smoking status: Never Smoker   • Smokeless tobacco: Never Used   • Alcohol use No       Allergies  Allergies   Allergen Reactions   • Daptomycin Rash     Body rash  RXN=2018     • Pcn [Penicillins] Hives and Rash      Rash & hives  RXN=since a kid   • Unasyn [Ampicillin-Sulbactam Sodium] Hives, Itching and Swelling   • Vancomycin Rash     Red man syndrome        Physical Exam  Laboratory   Hemodynamics  Temp (24hrs), Av.8 °C (98.2 °F), Min:36.8 °C (98.2 °F), Max:36.8 °C (98.2 °F)   Temperature: 36.8 °C (98.2 °F)  Pulse  Av.5  Min: 78  Max: 108    Blood Pressure: 159/90, NIBP: 152/78      Respiratory      Respiration: 14, Pulse Oximetry: 94 %             Physical Exam   Constitutional: He appears well-developed and well-nourished.   HENT:   Head: Normocephalic and atraumatic.   Eyes: Conjunctivae and EOM are normal. No scleral icterus.   Neck: Normal range of motion. Neck supple.   Cardiovascular: Exam reveals no gallop and no friction rub.    No murmur heard.  Irregular, slight tachycardia   Pulmonary/Chest: Effort normal and breath sounds normal. No respiratory distress. He has no wheezes. He has no rales.   Abdominal: Soft. Bowel sounds are normal. He exhibits no distension. There is no tenderness. There is no rebound and no guarding.   Musculoskeletal: He exhibits edema, tenderness and deformity (R great toe amputation).   R foot, R great toe amputation noted, incision area no discharge but there is surrounding erythema, swelling and mild tenderness.   Neurological: He is alert.   Skin: Skin is warm.   Psychiatric: He has a normal mood and affect. His behavior is normal.       Recent Labs      03/15/18   1710   WBC  13.3*   RBC   4.90   HEMOGLOBIN  14.1   HEMATOCRIT  42.2   MCV  86.1   MCH  28.8   MCHC  33.4*   RDW  46.2   PLATELETCT  310   MPV  11.2     Recent Labs      03/15/18   1710   SODIUM  135   POTASSIUM  4.4   CHLORIDE  99   CO2  27   GLUCOSE  169*   BUN  16   CREATININE  0.71   CALCIUM  9.3     Recent Labs      03/15/18   1710   ALTSGPT  42   ASTSGOT  38   ALKPHOSPHAT  84   TBILIRUBIN  0.7   GLUCOSE  169*                 Lab Results   Component Value Date    TROPONINI <0.01 02/09/2018     Urinalysis:    Lab Results  Component Value Date/Time   SPECGRAVITY 1.024 11/11/2017 0509   GLUCOSEUR Negative 11/11/2017 0509   KETONES Negative 11/11/2017 0509   NITRITE Negative 11/11/2017 0509   WBCURINE 5-10 (A) 11/11/2017 0509   RBCURINE 2-5 (A) 11/11/2017 0509   BACTERIA Negative 11/11/2017 0509   EPITHELCELL Moderate (A) 11/11/2017 0509        Imaging  Dx-foot-complete 3+ Right    Result Date: 3/15/2018  3/15/2018 6:20 PM HISTORY/REASON FOR EXAM:  Atraumatic right foot pain with breathing skin wound for 5 days. TECHNIQUE/EXAM DESCRIPTION AND NUMBER OF VIEWS: 3 views of the RIGHT foot. COMPARISON:  None. FINDINGS: No acute fracture or dislocation is identified. There is amputation of the right 1st toe and metatarsal at the level of the proximal metaphysis of the metatarsal. There is mild diffuse soft tissue swelling of the right foot. No subcutaneous gas is identified. There is no plain film evidence of osteomyelitis.     1.  No plain film evidence of right foot osteomyelitis. 2.  Diffuse soft tissue swelling. 3.  Amputation of the right 1st ray at the level of the proximal metaphysis of the 1st metatarsal.    Dx-foot-complete 3+ Right    Result Date: 2/17/2018 2/17/2018 11:04 PM HISTORY/REASON FOR EXAM:  Right foot infection, open wound TECHNIQUE/EXAM DESCRIPTION AND NUMBER OF VIEWS: 3 views of the RIGHT foot. COMPARISON:  Right foot x-ray 2/2/2018 FINDINGS: There has been first transmetatarsal amputation. The stump appears within normal  limits. There is adjacent soft tissue swelling. 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 There are subluxations at multiple metatarsophalangeal joint. There is spurring at the insertion of achilles' tendon and origin of plantar fascia.     1.  Prior first transmetatarsal amputation 2.  Midfoot osteoarthritis 3.  calcaneal spurring    Mr-foot-with & W/o Right    Result Date: 2/19/2018 2/18/2018 4:39 PM HISTORY/REASON FOR EXAM:  Open wound. Rt Foot pain, Open Wound Mid Foot TECHNIQUE/EXAM DESCRIPTION: MRI of the RIGHT foot with and without contrast. The study was performed on a Nighat 3.0 Taya MRI scanner. T1 axial, T1 sagittal, T1 coronal, STIR sagittal, T2 fast spin-echo fat-suppressed coronal, sagittal inversion recovery, and T1 postcontrast coronal images were obtained. 20 mL Omniscan contrast was administered intravenously. COMPARISON: 1/16/2018. FINDINGS: Interval further amputation of the first ray with small residual first metatarsal stump. There is a deep soft tissue ulcer at the amputation bed with extensive phlegmonous change. There are susceptibility artifact foci within the surgical bed which could relate to gas. There is tenosynovitis of the distal extensor hallucis longus underneath the ulcer. There is bone marrow replacement in the first metatarsal stump, consistent with osteomyelitis.     MR evidence of osteomyelitis within the first metatarsal stump. Deep soft tissue ulcer at the amputation bed with extensive phlegmonous change and possible gas. No discrete fluid collection seen. There is tenosynovitis of the distal extensor hallucis longus underneath the ulcer.      Assessment/Plan     I anticipate this patient will require at least two midnights for appropriate medical management, necessitating inpatient admission.    * Diabetic infection of right foot (CMS-[facility])- (present on admission)   Assessment & Plan    Antibiotics. Dr. Leo, Orthopedics consulted. Wound Care consult.  Please consult ID in  the morning.          Type 2 diabetes mellitus (CMS-[facility])- (present on admission)   Assessment & Plan    Hold metformin in case contrast study needed  Ordered A1c, correctional insulin and diabetes teaching        HTN (hypertension)- (present on admission)   Assessment & Plan    Uncontrolled. Incresase amlodipine, metoprolol        Atrial fibrillation (CMS-HCC)- (present on admission)   Assessment & Plan    Chronic atrial fibrillation rate currently mild tachycardia on BB and digoxin and on anticoagulation  Increase BB  Continue Xarelto however hold dose prior to procedure, pending Orthopedic recommendations.        HLD (hyperlipidemia)- (present on admission)   Assessment & Plan    Continue statin        Obesity- (present on admission)   Assessment & Plan    Recommend weight loss and dietary modification.             VTE prophylaxis: On Xarelto.    I spent 72 minutes, reviewing the chart, notes, vitals, labs, imaging, ordering labs, evaluating Sebastián Castro for assessment, enacting the plan above. 50% of the time was spent in counseling Sebastián Castro and answering questions. Discussed with ED physician. Discussed with Eliseo, Orthopedics. Time was devoted to counseling and coordinating care including review of records, pertinent lab data and studies, as well as discussing diagnostic evaluation and work up, planned therapeutic interventions and future disposition of care. Where indicated, the assessment and plan reflect discussion of patient with consultants, other healthcare providers, family members, and additional research needed to obtain further information in formulating the plan of care for Sebastián Castro.

## 2018-03-16 NOTE — ASSESSMENT & PLAN NOTE
MRSA foot infxn w cellulitis.  Xray - no Osteomyelitis . Discussed with ortho - plan non operative treatment.   de escalate to oral bactrim, doxy - complete 14 today days of atbs.  Wound care- arrange for outpatient treatment.   Prn ultram  Glycemic control

## 2018-03-16 NOTE — ED NOTES
Pt resting in bed, watching Tv. Pt informed of plan of care. Pt denies any further needs at this time. Siderails up x2, call light within reach.

## 2018-03-16 NOTE — ASSESSMENT & PLAN NOTE
Possible GPS, abd exam benign-- clinically better.   Continue tighten glycemic control  Prn zofran , sched reglan.

## 2018-03-16 NOTE — CARE PLAN
Problem: Safety  Goal: Will remain free from injury  Outcome: PROGRESSING AS EXPECTED  Treaded socks in place, bed in the lowest position,  call light and belongings within reach, pt call for assistance appropriately    Problem: Venous Thromboembolism (VTW)/Deep Vein Thrombosis (DVT) Prevention:  Goal: Patient will participate in Venous Thrombosis (VTE)/Deep Vein Thrombosis (DVT)Prevention Measures  Outcome: PROGRESSING AS EXPECTED  Receiving asa 81 and xarelto per MAR    Problem: Pain Management  Goal: Pain level will decrease to patient's comfort goal  Outcome: PROGRESSING AS EXPECTED  Denies pain at this time, hourly rounding in progress

## 2018-03-16 NOTE — PROGRESS NOTES
Pharmacy Kinetics 53 y.o. male on vancomycin day # 2  3/16/2018    Currently Dose: Vancomycin 2000 mg iv q24hr (~15 mg/kg)  Received Load Dose: Yes    Indication for Treatment: SSTI  ID Service Following: No    Pertinent history per medical record: Admitted on 3/15/2018 for worsening wound infection.  Patient has a history of R foot wound, s/p I&D and partial amputation on 18. The wound was noted to be erythematous and warm to the touch on 3/15, prompting presentation to the ED. Antibiotics initiated for SSTI.      Other antibiotics: ceftriaxone 2 gm iv q24h    Allergies: Daptomycin; Pcn [penicillins]; Unasyn [ampicillin-sulbactam sodium]; and Vancomycin     List concerns for accumulation of vancomycin: BMI ~40, electrolyte derangement, BUN:SCr ~20:1    Pertinent cultures to date:   3/15/18 blood culture x2 (NGTD)  18 wound culture right foot x2 (MRSA; Candida albicans)  18 wound culture right foot x2 (MRSA; Proteus mirabilis)    Recent Labs      03/15/18   1710  18   0345   WBC  13.3*  12.1*   NEUTSPOLYS  66.00   --      Recent Labs      03/15/18   1710  18   0345   BUN  16  13   CREATININE  0.71  0.71   ALBUMIN  4.0   --      Intake/Output Summary (Last 24 hours) at 18 0817  Last data filed at 18 0521   Gross per 24 hour   Intake              448 ml   Output              300 ml   Net              148 ml      Blood pressure 124/83, pulse 69, temperature 36.9 °C (98.4 °F), resp. rate 16, height 1.829 m (6'), weight (!) 133.4 kg (294 lb), SpO2 90 %. Temp (24hrs), Av.7 °C (98.1 °F), Min:36.5 °C (97.7 °F), Max:36.9 °C (98.4 °F)    Estimated Creatinine Clearance: 166.1 mL/min (by C-G formula based on SCr of 0.71 mg/dL).    A/P   1. Vancomycin dose change: not indicated   2. Next vancomycin level: 3/17/18 @1730  3. Goal trough: 12-16 mcg/mL  4. Comments: VS stable. Afebrile. WBC elevated/down. CrCl ~166 mL/min and inaccurate due to body habitus. Microbiology pending (prior C/S  results noted including MRSA with ISAMAR ~2 mcg/mL). Orthopedics consulted. Would consider ID consult. Prior vancomycin exposure noted. Prior vancomycin infusion related intolerance noted (will aim to keep rate of infusion low to mary; current dose infused over ~180 minutes). Factors for accumulation noted. Trough in place 3/17/18 prior to 3rd total dose to ensure clearance. Pharmacy will follow and continue to adjust as appropriate.    Eric Peacock, PharmD

## 2018-03-16 NOTE — CARE PLAN
Problem: Communication  Goal: The ability to communicate needs accurately and effectively will improve  Outcome: PROGRESSING AS EXPECTED  Reoriented pt to environment as needed; white board updated with [facility] staff and return time. Pt notified of hourly rounding and unit routine.    Appropriate signs in place at doorway for pt. Pt allowed time to ask questions; no questions at this time.    Problem: Pain Management  Goal: Pain level will decrease to patient's comfort goal  Outcome: PROGRESSING SLOWER THAN EXPECTED  Pt assessed for pain level routinely. Pt educated about around-the-clock px management and non-pharmacologic techniques. Pt verbalized understanding. PRN Pain medication given per MAR.

## 2018-03-16 NOTE — WOUND TEAM
Chart reviewed per consult order. Patient being followed by LPS team, LPS consult already ordered. Spoke with MILLICENT Lloyd, patient to be seen by LPS, likely later today. Wound team not following at this time.

## 2018-03-16 NOTE — ED NOTES
Med Rec complete per PT at bedside   Allergies Reviewed    Pt reports finishing 12 day course of Bactrim DS on 3/12.

## 2018-03-16 NOTE — DISCHARGE PLANNING
Transitional Care Navigator:    Per chart review patient has been on service with [facility] . Pending a DC home please place a referral for home health to allow for a resumption of services.

## 2018-03-16 NOTE — PROGRESS NOTES
Hospitalist paged in regards to pt N/V x 1. No PRN antiemetic available at that times. New order given for Zofran 4 mg q 4 hours PRN for Nausea. Telephone order read back and verified. Will continue to monitor.

## 2018-03-16 NOTE — PROGRESS NOTES
[facility]  Inpatient Services  Initial Wound & Skin Care Evaluation    LIMB PRESERVATION SERVICE NOTE:    Wound(s):Right foot first ray amputation S/P  Irrigation and debridement, multiloculated abscess, 2/19/18 right foot.  Allergies: Daptomycin; Pcn [penicillins]; Unasyn [ampicillin-sulbactam sodium]; and Vancomycin  Start of Care: 3/16/2018  Room/Bed  T329/00      Subjective:      History of Present Illness:   Past Medical History:   Diagnosis Date   • A-fib (CMS-HCC)    • Chest pain    • Diabetes (CMS-HCC)    • Diabetic neuropathy (CMS-HCC)    • Hypercholesterolemia    • Hypertension    • Morbid obesity (CMS-HCC)    • Noncompliance    • Normal cardiac stress test        Patient is a 53 y.o. male. Admitted for Diabetic foot infection (CMS-HCC),     Patient is well known to LPS and outpatient wound care services.      Patient presented with S/p R great toe amputation by Dr. Leo on 11/10/17 for infection causing sepsis. Right foot first ray amputation. S/P  Irrigation and debridement, multiloculated abscess, 2/19/18          Pain:        Patient resting comfortably, Pain on dressing change/palpation.       Objective:        PHYSICAL EXAMINATION:     General Appearance:  Well developed,  nourished, in no acute distress     Sensory Assessment        Patient sensation insensate bilaterally with light touch 10 of 10 points        Vascular Assessment        +2 DP, +2 PT bilaterally     Orthotic, protective, supportive devices:      Offloading  Patient with Humbird boot at bedside Right LE,     Vitals    /63   Pulse 74   Temp 36.1 °C (96.9 °F)   Resp 18   Ht 1.829 m (6')   Wt (!) 133.4 kg (294 lb)   SpO2 91%   BMI 39.87 kg/m²      Wound Characteristics                                                    Location: Right TMA   Initial Evaluation  Date:3/16/2018   Tissue Type and %: 80% Yellow Slough, 20% Red   Periwound: Red   Drainage: Scant Puralant   Exposed structures None   Wound Edges:    Attached   Odor: None   S&S of Infection:   Edema/Erythema   Edema: Localized at Site   Sensation: Insensate               Measurements: Initial Evaluation  Date:3/16/2018   Length (cm) 1.5   Width (cm) 1.5   Depth (cm) 0.5   Tract/undermine              Tests and Measures:    Labs  Recent Labs      03/15/18   1710  03/16/18   0345   WBC  13.3*  12.1*   RBC  4.90  4.42*   HEMOGLOBIN  14.1  12.4*   HEMATOCRIT  42.2  37.6*   MCV  86.1  85.1   MCH  28.8  28.1   MCHC  33.4*  33.0*   RDW  46.2  44.6   PLATELETCT  310  276   MPV  11.2  11.1     Recent Labs      03/15/18   1710 03/16/18   0345   SODIUM  135  137   POTASSIUM  4.4  3.3*   CHLORIDE  99  104   CO2  27  24   GLUCOSE  169*  168*   BUN  16  13       A1C 5.9 % managed diabetic  ESR: 35  CRP: 1.68    SHAYAN    R: 1.09  L: 1.15    RLE: Triphasic/triphasic   LLE: Triphasic/triphasic     Bilateral:   Doppler waveforms of the common femoral artery are of high amplitude and    triphasic.    Doppler waveforms at the ankle are brisk and triphasic.    Ankle-brachial indices are normal.     Imaging    X-Ray: right foot 3/15/18  Impression       1.  No plain film evidence of right foot osteomyelitis.    2.  Diffuse soft tissue swelling.    3.  Amputation of the right 1st ray at the level of the proximal metaphysis of the 1st metatarsal.         Infection Management  Microbiology neg blood    Antibiotics rocephin       Procedures:     Debridement :  NA   Cleansed with:     NS                                                                     Periwound protected with: skin prep   Primary dressing: hydrofera blue ready   Secondary Dressing: foam   Other: tape    Procedures/Frequency:    Sutures Removed with scissors and forceps:   Normal Saline and guaze were used to cleanse the wound and periwound. Sutures were then removed. The patient tolerated the procedure well with little to no discomfort or pain with only scant bleeding from the wound bed.  The area was once again  cleansed with normal saline revealing 80% yellow slough and 20% red/pink tissue.     NURSING TO CHANGE RIGHT FOOT DRESSING EVERY 72 HOURS AND PRN FOR SATURATION OR DISLODGEMENT  Nursing to cleanse wound/periwound with Normal Saline.  Pat periwound dry and apply skin prep/No Sting to periwound.  Let air dry for 1-2 minutes. Apply Hydrofera Blue Ready with print side facing away from the patient.  Cover with non adhesive foam, cut to size, and secure with hypofix tape  Please take Weekly Wound Photos. Notify wound team if wound deteriorates or fails to progress.     Patient Education: Implications of loss of protective sensation (LOPS) discussed with patient- including increased risk for amputation.  Advised to check foot at least daily, moisturize foot, and to always wear protective foot wear.      Professional Collaboration: bedside RN, ID      Assessment:      Wound etiology: Surgical     Wound Progress:  Initial Assessment     Rationale for Treatment: Hydrofera Blue Ready: to manage bioburden and absorb exudate.     Patient tolerance/compliance: Open to Education     Complicating factors: DM, Obesity     Need for ongoing  Wound Care: Nursing to do wound care, LPS will follow    Goals: Decreased wound size 2% each week -- 100% granulation in 2 weeks      Plan:      Treatment Plan and Recommendations:    Nursing to complete wound care, LPS to follow  Wound dressing orders updated  Offloading Boot in use.  AB per ID  D/W Pt OP foot care to prevent new formation of DFU from callous points.     RSKIN: CURRENT (X) ORDERED (O)  Q shift Michael:  X  Q shift pressure point assessments:  X  Pressure redistribution mattress        DARYL      Bariatric DARYL      Bariatric foam        Heel float boots       Heels floated on pillows      Barrier wipes      Barrier Cream      Barrier paste      Sacral silicone dressing      Padded O2 tubing      Anchorfast      Trach with Optifoam split foam       Waffle cushion      Rectal tube or  BMS      Antifungal tx    Turn q 2 hours  Up to chair  Ambulate   PT/OT     Dietician      PO     TF   TPN     PVN    NPO   # days   Other       WOUND TEAM PLAN OF CARE (X):   NPWT change 3 x week:        Dressing changes by wound team:       Follow up as needed:    X   Other (explain):    Anticipated discharge plans (X):  SNF:           Home Care:           Outpatient Wound Center:   X         Self Care:            Other:           TBD:

## 2018-03-16 NOTE — PROGRESS NOTES
Report received. Assumed care. Pt in bed awake. A/O x4. VSS. Responds appropriately. Denies pain, SOB. Assessment complete. Noted wound to the right foot, leyla.  Discussed POC, pain control, IV antbx, safety, DC planning , pt verbalizes understanding. Explained importance of calling before getting OOB. Call light and belongings within reach. Pt refuses bed alarm despite education. Bed in the lowest position. Treaded socks in place. Hourly rounding in progress. Will continue to monitor .

## 2018-03-16 NOTE — PROGRESS NOTES
Brief Ortho PA note  Case discussed with Lexus Courtney and Jessica  Pt well known to our service  Full consult pending  Agree admit hospitalist  NPO anna DENNIS pending further evaluation and management

## 2018-03-17 LAB
ANION GAP SERPL CALC-SCNC: 8 MMOL/L (ref 0–11.9)
BASOPHILS # BLD AUTO: 0.5 % (ref 0–1.8)
BASOPHILS # BLD: 0.06 K/UL (ref 0–0.12)
BUN SERPL-MCNC: 15 MG/DL (ref 8–22)
CALCIUM SERPL-MCNC: 8.6 MG/DL (ref 8.5–10.5)
CHLORIDE SERPL-SCNC: 104 MMOL/L (ref 96–112)
CO2 SERPL-SCNC: 26 MMOL/L (ref 20–33)
CREAT SERPL-MCNC: 0.76 MG/DL (ref 0.5–1.4)
EOSINOPHIL # BLD AUTO: 0.5 K/UL (ref 0–0.51)
EOSINOPHIL NFR BLD: 4.5 % (ref 0–6.9)
ERYTHROCYTE [DISTWIDTH] IN BLOOD BY AUTOMATED COUNT: 46.7 FL (ref 35.9–50)
GLUCOSE BLD-MCNC: 137 MG/DL (ref 65–99)
GLUCOSE BLD-MCNC: 154 MG/DL (ref 65–99)
GLUCOSE BLD-MCNC: 165 MG/DL (ref 65–99)
GLUCOSE BLD-MCNC: 172 MG/DL (ref 65–99)
GLUCOSE SERPL-MCNC: 144 MG/DL (ref 65–99)
GRAM STN SPEC: NORMAL
HCT VFR BLD AUTO: 36.4 % (ref 42–52)
HGB BLD-MCNC: 11.8 G/DL (ref 14–18)
IMM GRANULOCYTES # BLD AUTO: 0.04 K/UL (ref 0–0.11)
IMM GRANULOCYTES NFR BLD AUTO: 0.4 % (ref 0–0.9)
LYMPHOCYTES # BLD AUTO: 2.76 K/UL (ref 1–4.8)
LYMPHOCYTES NFR BLD: 24.9 % (ref 22–41)
MCH RBC QN AUTO: 28.4 PG (ref 27–33)
MCHC RBC AUTO-ENTMCNC: 32.4 G/DL (ref 33.7–35.3)
MCV RBC AUTO: 87.7 FL (ref 81.4–97.8)
MONOCYTES # BLD AUTO: 0.81 K/UL (ref 0–0.85)
MONOCYTES NFR BLD AUTO: 7.3 % (ref 0–13.4)
NEUTROPHILS # BLD AUTO: 6.92 K/UL (ref 1.82–7.42)
NEUTROPHILS NFR BLD: 62.4 % (ref 44–72)
NRBC # BLD AUTO: 0 K/UL
NRBC BLD-RTO: 0 /100 WBC
PLATELET # BLD AUTO: 251 K/UL (ref 164–446)
PMV BLD AUTO: 11.1 FL (ref 9–12.9)
POTASSIUM SERPL-SCNC: 3.6 MMOL/L (ref 3.6–5.5)
RBC # BLD AUTO: 4.15 M/UL (ref 4.7–6.1)
SIGNIFICANT IND 70042: NORMAL
SITE SITE: NORMAL
SODIUM SERPL-SCNC: 138 MMOL/L (ref 135–145)
SOURCE SOURCE: NORMAL
VANCOMYCIN TROUGH SERPL-MCNC: 13.1 UG/ML (ref 10–20)
WBC # BLD AUTO: 11.1 K/UL (ref 4.8–10.8)

## 2018-03-17 PROCEDURE — 770001 HCHG ROOM/CARE - MED/SURG/GYN PRIV*

## 2018-03-17 PROCEDURE — 99232 SBSQ HOSP IP/OBS MODERATE 35: CPT | Performed by: HOSPITALIST

## 2018-03-17 PROCEDURE — 85025 COMPLETE CBC W/AUTO DIFF WBC: CPT

## 2018-03-17 PROCEDURE — 80048 BASIC METABOLIC PNL TOTAL CA: CPT

## 2018-03-17 PROCEDURE — 80202 ASSAY OF VANCOMYCIN: CPT

## 2018-03-17 PROCEDURE — 700105 HCHG RX REV CODE 258: Performed by: INTERNAL MEDICINE

## 2018-03-17 PROCEDURE — 82962 GLUCOSE BLOOD TEST: CPT | Mod: 91

## 2018-03-17 PROCEDURE — 700102 HCHG RX REV CODE 250 W/ 637 OVERRIDE(OP): Performed by: INTERNAL MEDICINE

## 2018-03-17 PROCEDURE — 700102 HCHG RX REV CODE 250 W/ 637 OVERRIDE(OP): Performed by: HOSPITALIST

## 2018-03-17 PROCEDURE — A9270 NON-COVERED ITEM OR SERVICE: HCPCS | Performed by: INTERNAL MEDICINE

## 2018-03-17 PROCEDURE — 36415 COLL VENOUS BLD VENIPUNCTURE: CPT

## 2018-03-17 PROCEDURE — 700111 HCHG RX REV CODE 636 W/ 250 OVERRIDE (IP): Performed by: INTERNAL MEDICINE

## 2018-03-17 PROCEDURE — A9270 NON-COVERED ITEM OR SERVICE: HCPCS | Performed by: HOSPITALIST

## 2018-03-17 RX ORDER — TRAMADOL HYDROCHLORIDE 50 MG/1
50 TABLET ORAL EVERY 6 HOURS PRN
Status: DISCONTINUED | OUTPATIENT
Start: 2018-03-17 | End: 2018-03-20 | Stop reason: HOSPADM

## 2018-03-17 RX ADMIN — ASPIRIN 81 MG: 81 TABLET, COATED ORAL at 08:55

## 2018-03-17 RX ADMIN — RIVAROXABAN 20 MG: 20 TABLET, FILM COATED ORAL at 18:27

## 2018-03-17 RX ADMIN — METOCLOPRAMIDE HYDROCHLORIDE 10 MG: 5 SOLUTION ORAL at 08:55

## 2018-03-17 RX ADMIN — FAMOTIDINE 20 MG: 20 TABLET, FILM COATED ORAL at 08:55

## 2018-03-17 RX ADMIN — POTASSIUM CHLORIDE 20 MEQ: 1500 TABLET, EXTENDED RELEASE ORAL at 08:55

## 2018-03-17 RX ADMIN — CEFTRIAXONE 2 G: 2 INJECTION, POWDER, FOR SOLUTION INTRAMUSCULAR; INTRAVENOUS at 08:55

## 2018-03-17 RX ADMIN — METOPROLOL TARTRATE 100 MG: 100 TABLET, FILM COATED ORAL at 15:55

## 2018-03-17 RX ADMIN — VANCOMYCIN HYDROCHLORIDE 2000 MG: 100 INJECTION, POWDER, LYOPHILIZED, FOR SOLUTION INTRAVENOUS at 18:27

## 2018-03-17 RX ADMIN — INSULIN HUMAN 1 UNITS: 100 INJECTION, SOLUTION PARENTERAL at 06:32

## 2018-03-17 RX ADMIN — PRAVASTATIN SODIUM 20 MG: 10 TABLET ORAL at 22:47

## 2018-03-17 RX ADMIN — SODIUM CHLORIDE: 9 INJECTION, SOLUTION INTRAVENOUS at 08:54

## 2018-03-17 RX ADMIN — LACTOBACILLUS ACIDOPHILUS / LACTOBACILLUS BULGARICUS 1 PACKET: 100 MILLION CFU STRENGTH GRANULES at 08:00

## 2018-03-17 RX ADMIN — INSULIN HUMAN 1 UNITS: 100 INJECTION, SOLUTION PARENTERAL at 22:43

## 2018-03-17 RX ADMIN — ACETAMINOPHEN 650 MG: 325 TABLET, FILM COATED ORAL at 06:29

## 2018-03-17 RX ADMIN — DIGOXIN 250 MCG: 250 TABLET ORAL at 08:55

## 2018-03-17 RX ADMIN — AMLODIPINE BESYLATE 10 MG: 10 TABLET ORAL at 08:55

## 2018-03-17 RX ADMIN — LISINOPRIL 40 MG: 20 TABLET ORAL at 08:55

## 2018-03-17 RX ADMIN — METOPROLOL TARTRATE 100 MG: 100 TABLET, FILM COATED ORAL at 22:47

## 2018-03-17 RX ADMIN — LACTOBACILLUS ACIDOPHILUS / LACTOBACILLUS BULGARICUS 1 PACKET: 100 MILLION CFU STRENGTH GRANULES at 18:29

## 2018-03-17 RX ADMIN — FAMOTIDINE 20 MG: 20 TABLET, FILM COATED ORAL at 22:47

## 2018-03-17 RX ADMIN — METOPROLOL TARTRATE 100 MG: 100 TABLET, FILM COATED ORAL at 06:29

## 2018-03-17 RX ADMIN — METOCLOPRAMIDE HYDROCHLORIDE 10 MG: 5 SOLUTION ORAL at 12:19

## 2018-03-17 ASSESSMENT — ENCOUNTER SYMPTOMS
WEAKNESS: 1
HALLUCINATIONS: 0
FLANK PAIN: 0
SPEECH CHANGE: 0
FOCAL WEAKNESS: 0
DIARRHEA: 0
BACK PAIN: 0
FEVER: 0
NECK PAIN: 0
WHEEZING: 0
DIAPHORESIS: 0
EYE REDNESS: 0
SENSORY CHANGE: 0
COUGH: 0
SHORTNESS OF BREATH: 0
CHILLS: 0
TREMORS: 0
NAUSEA: 1
VOMITING: 1
ABDOMINAL PAIN: 0
EYE DISCHARGE: 0

## 2018-03-17 ASSESSMENT — PAIN SCALES - GENERAL
PAINLEVEL_OUTOF10: ASSUMED PAIN PRESENT
PAINLEVEL_OUTOF10: 5
PAINLEVEL_OUTOF10: ASSUMED PAIN PRESENT
PAINLEVEL_OUTOF10: ASSUMED PAIN PRESENT
PAINLEVEL_OUTOF10: 6
PAINLEVEL_OUTOF10: ASSUMED PAIN PRESENT

## 2018-03-17 ASSESSMENT — LIFESTYLE VARIABLES: SUBSTANCE_ABUSE: 0

## 2018-03-17 NOTE — PROGRESS NOTES
Assumed care of pt 1845. Pt resting in bed.  Reviewed POC including safety, mobility, pain management, medication administration, DVT prophylaxis, and C.Diff education. Call light within reach. Pt calls appropriately. Hourly rounding in place. Pt has no needs or concerns at this time.

## 2018-03-17 NOTE — CARE PLAN
Problem: Safety  Goal: Will remain free from falls  Outcome: PROGRESSING AS EXPECTED  Pt educated on need to call before getting out of bed. Pt verbalizes understanding. Treaded socks on pt. Bed locked. DME in bathroom. Room safety check. Bed in lowest position. Pt calls for help appropriately on call light. Call light with in reach. Hourly rounding in place.      Problem: Knowledge Deficit  Goal: Knowledge of disease process/condition, treatment plan, diagnostic tests, and medications will improve  Outcome: PROGRESSING AS EXPECTED  Reviewed POC including safety, mobility, pain management, medication administration, DVT prophylaxis, and C.Diff education.

## 2018-03-17 NOTE — PROGRESS NOTES
Orthopaedic PA Progress Note    Interval changes: Dressing change today, WBC decreasing, no clinical progression of infection    ROS - Patient denies any new issues. No chest pain, dyspnea, or fever.  Pain well controlled.    Blood pressure 104/63, pulse 62, temperature 36.2 °C (97.2 °F), resp. rate 16, height 1.829 m (6'), weight (!) 133.4 kg (294 lb), SpO2 96 %.    Patient seen and examined  No acute distress  Breathing non labored  RRR  Dressing changed. Area of macerated scab approx 1.5 cm greatest diamete at proximal end of 1st Ray scar. Not hot nor exquisitely tender.  Strong and palpable 2+ dorsalis pedis and posterior tibial pulses with capillary refill less than 2 seconds. No lower leg tenderness or discomfort.    Recent Labs      03/15/18   1710  03/16/18   0345  03/17/18   0237   WBC  13.3*  12.1*  11.1*   RBC  4.90  4.42*  4.15*   HEMOGLOBIN  14.1  12.4*  11.8*   HEMATOCRIT  42.2  37.6*  36.4*   MCV  86.1  85.1  87.7   MCH  28.8  28.1  28.4   MCHC  33.4*  33.0*  32.4*   RDW  46.2  44.6  46.7   PLATELETCT  310  276  251   MPV  11.2  11.1  11.1     Active Hospital Problems    Diagnosis   • Diabetic infection of right foot (CMS-HCC) [E11.69, L08.9]     Priority: High   • N&V (nausea and vomiting) [R11.2]     Priority: Medium   • Type 2 diabetes mellitus (CMS-HCC) [E11.9]     Priority: Medium   • HLD (hyperlipidemia) [E78.5]     Priority: Low   • Obesity [E66.9]     Priority: Low   • HTN (hypertension) [I10]   • Atrial fibrillation (CMS-[facility]) [I48.91]       Assessment/Plan:   I & D right foot 2/19  Readmitted for cellulitis 3/15.  Improving with meds  Will follow

## 2018-03-17 NOTE — PROGRESS NOTES
Pharmacy Kinetics 53 y.o. male on vancomycin day # 3 3/17/2018    Currently Dose: Vancomycin 2000 mg iv q24hr (~15 mg/kg)  Received Load Dose: Yes     Indication for Treatment: SSTI  ID Service Following: No     Pertinent history per medical record: Admitted on 3/15/2018 for worsening wound infection.  Patient has a history of R foot wound, s/p I&D and partial amputation on 18. The wound was noted to be erythematous and warm to the touch on 3/15, prompting presentation to the ED. Antibiotics initiated for SSTI.      Other antibiotics: ceftriaxone 2 gm iv q24h     Allergies: Daptomycin; Pcn [penicillins]; Unasyn [ampicillin-sulbactam sodium]; and Vancomycin      List concerns for accumulation of vancomycin: BMI ~40, electrolyte derangement, BUN:SCr ~20:1     Pertinent cultures to date:   3/15/18 blood culture x2 (NGTD)  18 wound culture right foot x2 (MRSA; Candida albicans)  18 wound culture right foot x2 (MRSA; Proteus mirabilis)    Recent Labs      03/15/18   1710  18   0345  18   0237   WBC  13.3*  12.1*  11.1*   NEUTSPOLYS  66.00   --   62.40     Recent Labs      03/15/18   1710  18   0345  18   0237   BUN  16  13  15   CREATININE  0.71  0.71  0.76   ALBUMIN  4.0   --    --      No results for input(s): VANCOTROUGH, VANCOPEAK, VANCORANDOM in the last 72 hours.  Intake/Output Summary (Last 24 hours) at 18 0905  Last data filed at 18 1800   Gross per 24 hour   Intake              480 ml   Output              400 ml   Net               80 ml      Blood pressure 104/63, pulse 62, temperature 36.2 °C (97.2 °F), resp. rate 16, height 1.829 m (6'), weight (!) 133.4 kg (294 lb), SpO2 96 %. Temp (24hrs), Av.5 °C (97.7 °F), Min:36.1 °C (96.9 °F), Max:37.1 °C (98.7 °F)      A/P   1. Vancomycin dose change: Not indicated at this time  2. Next vancomycin level: 3/17 @ 1730 (prior to 3rd total dose)  3. Goal trough: 12-16 mcg/mL  4. Comments: Afebrile, leukocytosis  resolving. High risk for accumulation. Renal indices appear stable. Inconsistent urine output charted, ~700 ml yesterday morning charted.      Handy Mike, PharmD

## 2018-03-17 NOTE — PROGRESS NOTES
[facility] Hospitalist Progress Note    Date of Service: 3/17/2018    Chief Complaint  53 y.o. male hx of  diabetic right great toe  infxn post irrigation and debridement to the area proximal to his right toe amputation site, receiving home health wound care , DM, Hypertension, afib on Xarelto  admitted 3/15/2018 right foot pain, swelling, redness.     Interval Problem Update    Evaluated by ortho - plan for surgery. afeb on IV atbs. Cxs NGTD    Consultants/Specialty  Orthopedics.     Disposition  tbd        Review of Systems   Constitutional: Negative for chills, diaphoresis, fever and malaise/fatigue.   Eyes: Negative for discharge and redness.   Respiratory: Negative for cough, shortness of breath and wheezing.    Cardiovascular: Positive for leg swelling (chronic ). Negative for chest pain.   Gastrointestinal: Positive for nausea and vomiting. Negative for abdominal pain and diarrhea.   Genitourinary: Negative for flank pain and hematuria.   Musculoskeletal: Positive for joint pain. Negative for back pain and neck pain.   Neurological: Positive for weakness. Negative for tremors, sensory change, speech change and focal weakness.   Psychiatric/Behavioral: Negative for hallucinations and substance abuse.      Physical Exam  Laboratory/Imaging   Hemodynamics  Temp (24hrs), Av.7 °C (98 °F), Min:36.2 °C (97.2 °F), Max:37.1 °C (98.7 °F)   Temperature: 36.2 °C (97.2 °F)  Pulse  Av.5  Min: 62  Max: 108    Blood Pressure: 104/63      Respiratory      Respiration: 16, Pulse Oximetry: 96 %        RUL Breath Sounds: Clear, RML Breath Sounds: Diminished, RLL Breath Sounds: Diminished, ELIAS Breath Sounds: Clear, LLL Breath Sounds: Diminished    Fluids    Intake/Output Summary (Last 24 hours) at 18 1229  Last data filed at 18 1800   Gross per 24 hour   Intake              240 ml   Output                0 ml   Net              240 ml       Nutrition  Orders Placed This Encounter   Procedures   • Diet Order      Standing Status:   Standing     Number of Occurrences:   1     Order Specific Question:   Diet:     Answer:   Diabetic [3]     Physical Exam   Constitutional: He is oriented to person, place, and time. No distress.   HENT:   Head: Normocephalic and atraumatic.   Nose: Nose normal.   Eyes: Conjunctivae and EOM are normal. No scleral icterus.   Neck: Normal range of motion. No JVD present.   Cardiovascular: Normal rate and regular rhythm.    No murmur heard.  Pulmonary/Chest: Effort normal and breath sounds normal. No stridor. He has no wheezes. He has no rales.   Abdominal: Soft. Bowel sounds are normal. He exhibits no distension. There is no tenderness.   Obese.    Musculoskeletal: He exhibits edema and tenderness.   Right foot medial surgical site wound dressed. Great toe amputated   Mod swelling    Neurological: He is alert and oriented to person, place, and time. No cranial nerve deficit.   Skin: Skin is warm and dry. He is not diaphoretic. No pallor.   Psychiatric: He has a normal mood and affect. His behavior is normal.   Vitals reviewed.      Recent Labs      03/15/18   1710  03/16/18   0345  03/17/18   0237   WBC  13.3*  12.1*  11.1*   RBC  4.90  4.42*  4.15*   HEMOGLOBIN  14.1  12.4*  11.8*   HEMATOCRIT  42.2  37.6*  36.4*   MCV  86.1  85.1  87.7   MCH  28.8  28.1  28.4   MCHC  33.4*  33.0*  32.4*   RDW  46.2  44.6  46.7   PLATELETCT  310  276  251   MPV  11.2  11.1  11.1     Recent Labs      03/15/18   1710  03/16/18   0345  03/17/18   0237   SODIUM  135  137  138   POTASSIUM  4.4  3.3*  3.6   CHLORIDE  99  104  104   CO2  27  24  26   GLUCOSE  169*  168*  144*   BUN  16  13  15   CREATININE  0.71  0.71  0.76   CALCIUM  9.3  8.5  8.6                      Assessment/Plan     * Diabetic infection of right foot (CMS-HCC)- (present on admission)   Assessment & Plan    Continue IV vanco rocephin- Hx of redmans synd- pre med with IV benadryl and tylenol, reduce rate of infusion .  Fu cultures.   Wound care  Prn  ultram  Glycemic control   Ortho plan I+D          N&V (nausea and vomiting)   Assessment & Plan    Possible GPS, abd exam benign-- improved symptoms.   Continue tighten glycemic control  Prn zofran   Continue sched reglan.         Type 2 diabetes mellitus (CMS-HCC)- (present on admission)   Assessment & Plan    w hyperglycemia  Monitor serial accuchecks, SSI as needed.   Hold metformin in event needs contrast study.           HTN (hypertension)- (present on admission)   Assessment & Plan    controlled  cw amlodipine, metoprolol  Monitor bp        Atrial fibrillation (CMS-HCC)- (present on admission)   Assessment & Plan    Controlled rate  cw lopressor, digoxin.    Xarelto - may need to hold prior to surgery , if planned.         HLD (hyperlipidemia)- (present on admission)   Assessment & Plan    Continue statin        Obesity- (present on admission)   Assessment & Plan    Recommend weight loss and dietary modification.         Discussed w multi disciplinary team plan of care.   Quality-Core Measures

## 2018-03-18 LAB
BACTERIA WND AEROBE CULT: ABNORMAL
BACTERIA WND AEROBE CULT: ABNORMAL
GLUCOSE BLD-MCNC: 144 MG/DL (ref 65–99)
GLUCOSE BLD-MCNC: 150 MG/DL (ref 65–99)
GLUCOSE BLD-MCNC: 158 MG/DL (ref 65–99)
GLUCOSE BLD-MCNC: 187 MG/DL (ref 65–99)
GRAM STN SPEC: ABNORMAL
SIGNIFICANT IND 70042: ABNORMAL
SITE SITE: ABNORMAL
SOURCE SOURCE: ABNORMAL

## 2018-03-18 PROCEDURE — 700102 HCHG RX REV CODE 250 W/ 637 OVERRIDE(OP): Performed by: HOSPITALIST

## 2018-03-18 PROCEDURE — 700102 HCHG RX REV CODE 250 W/ 637 OVERRIDE(OP): Performed by: INTERNAL MEDICINE

## 2018-03-18 PROCEDURE — A9270 NON-COVERED ITEM OR SERVICE: HCPCS | Performed by: HOSPITALIST

## 2018-03-18 PROCEDURE — A9270 NON-COVERED ITEM OR SERVICE: HCPCS | Performed by: INTERNAL MEDICINE

## 2018-03-18 PROCEDURE — 700111 HCHG RX REV CODE 636 W/ 250 OVERRIDE (IP): Performed by: INTERNAL MEDICINE

## 2018-03-18 PROCEDURE — 99232 SBSQ HOSP IP/OBS MODERATE 35: CPT | Performed by: HOSPITALIST

## 2018-03-18 PROCEDURE — 770001 HCHG ROOM/CARE - MED/SURG/GYN PRIV*

## 2018-03-18 PROCEDURE — 82962 GLUCOSE BLOOD TEST: CPT | Mod: 91

## 2018-03-18 PROCEDURE — 700105 HCHG RX REV CODE 258: Performed by: INTERNAL MEDICINE

## 2018-03-18 RX ORDER — METOCLOPRAMIDE 10 MG/1
10 TABLET ORAL
Status: DISCONTINUED | OUTPATIENT
Start: 2018-03-18 | End: 2018-03-20 | Stop reason: HOSPADM

## 2018-03-18 RX ADMIN — LACTOBACILLUS ACIDOPHILUS / LACTOBACILLUS BULGARICUS 1 PACKET: 100 MILLION CFU STRENGTH GRANULES at 17:10

## 2018-03-18 RX ADMIN — METOPROLOL TARTRATE 100 MG: 100 TABLET, FILM COATED ORAL at 21:03

## 2018-03-18 RX ADMIN — METOPROLOL TARTRATE 100 MG: 100 TABLET, FILM COATED ORAL at 06:14

## 2018-03-18 RX ADMIN — CEFTRIAXONE 2 G: 2 INJECTION, POWDER, FOR SOLUTION INTRAMUSCULAR; INTRAVENOUS at 08:52

## 2018-03-18 RX ADMIN — LACTOBACILLUS ACIDOPHILUS / LACTOBACILLUS BULGARICUS 1 PACKET: 100 MILLION CFU STRENGTH GRANULES at 11:37

## 2018-03-18 RX ADMIN — VANCOMYCIN HYDROCHLORIDE 2000 MG: 100 INJECTION, POWDER, LYOPHILIZED, FOR SOLUTION INTRAVENOUS at 18:24

## 2018-03-18 RX ADMIN — METOPROLOL TARTRATE 100 MG: 100 TABLET, FILM COATED ORAL at 15:36

## 2018-03-18 RX ADMIN — DIGOXIN 250 MCG: 250 TABLET ORAL at 08:51

## 2018-03-18 RX ADMIN — STANDARDIZED SENNA CONCENTRATE AND DOCUSATE SODIUM 2 TABLET: 8.6; 5 TABLET, FILM COATED ORAL at 08:52

## 2018-03-18 RX ADMIN — PRAVASTATIN SODIUM 20 MG: 10 TABLET ORAL at 21:03

## 2018-03-18 RX ADMIN — LACTOBACILLUS ACIDOPHILUS / LACTOBACILLUS BULGARICUS 1 PACKET: 100 MILLION CFU STRENGTH GRANULES at 08:51

## 2018-03-18 RX ADMIN — LISINOPRIL 40 MG: 20 TABLET ORAL at 08:51

## 2018-03-18 RX ADMIN — INSULIN HUMAN 1 UNITS: 100 INJECTION, SOLUTION PARENTERAL at 17:10

## 2018-03-18 RX ADMIN — METOCLOPRAMIDE HYDROCHLORIDE 10 MG: 5 SOLUTION ORAL at 06:14

## 2018-03-18 RX ADMIN — RIVAROXABAN 20 MG: 20 TABLET, FILM COATED ORAL at 17:10

## 2018-03-18 RX ADMIN — SODIUM CHLORIDE: 9 INJECTION, SOLUTION INTRAVENOUS at 18:25

## 2018-03-18 RX ADMIN — AMLODIPINE BESYLATE 10 MG: 10 TABLET ORAL at 08:51

## 2018-03-18 RX ADMIN — POTASSIUM CHLORIDE 20 MEQ: 1500 TABLET, EXTENDED RELEASE ORAL at 08:51

## 2018-03-18 RX ADMIN — FAMOTIDINE 20 MG: 20 TABLET, FILM COATED ORAL at 08:51

## 2018-03-18 RX ADMIN — SODIUM CHLORIDE: 9 INJECTION, SOLUTION INTRAVENOUS at 04:25

## 2018-03-18 RX ADMIN — ACETAMINOPHEN 650 MG: 325 TABLET, FILM COATED ORAL at 08:51

## 2018-03-18 RX ADMIN — METOCLOPRAMIDE HYDROCHLORIDE 10 MG: 10 TABLET ORAL at 17:10

## 2018-03-18 RX ADMIN — METOCLOPRAMIDE HYDROCHLORIDE 10 MG: 10 TABLET ORAL at 11:37

## 2018-03-18 RX ADMIN — ASPIRIN 81 MG: 81 TABLET, COATED ORAL at 08:52

## 2018-03-18 RX ADMIN — FAMOTIDINE 20 MG: 20 TABLET, FILM COATED ORAL at 21:03

## 2018-03-18 RX ADMIN — INSULIN HUMAN 1 UNITS: 100 INJECTION, SOLUTION PARENTERAL at 21:03

## 2018-03-18 ASSESSMENT — ENCOUNTER SYMPTOMS
FOCAL WEAKNESS: 0
VOMITING: 0
CHILLS: 0
FLANK PAIN: 0
SPEECH CHANGE: 0
DIARRHEA: 0
EYE DISCHARGE: 0
NAUSEA: 0
DIAPHORESIS: 0
WEAKNESS: 1
HALLUCINATIONS: 0
FEVER: 0
SHORTNESS OF BREATH: 0
EYE REDNESS: 0
COUGH: 0
TREMORS: 0
WHEEZING: 0
BACK PAIN: 0
SENSORY CHANGE: 0
NECK PAIN: 0
ABDOMINAL PAIN: 0

## 2018-03-18 ASSESSMENT — PAIN SCALES - GENERAL
PAINLEVEL_OUTOF10: 3
PAINLEVEL_OUTOF10: 3

## 2018-03-18 ASSESSMENT — LIFESTYLE VARIABLES: SUBSTANCE_ABUSE: 0

## 2018-03-18 NOTE — CARE PLAN
Problem: Infection  Goal: Will remain free from infection    Intervention: Assess signs and symptoms of infection  Educated pt on s/s of infection.

## 2018-03-18 NOTE — PROGRESS NOTES
[facility] Hospitalist Progress Note    Date of Service: 3/18/2018    Chief Complaint  53 y.o. male hx of  diabetic right great toe  infxn post irrigation and debridement to the area proximal to his right toe amputation site, receiving home health wound care , DM, Hypertension, afib on Xarelto  admitted 3/15/2018 right foot pain, swelling, redness.     Interval Problem Update    Afeb.  Right foot moderate swelling. Decreased redness and drainage from medial foot ulcer.     Consultants/Specialty  Orthopedics.     Disposition  tbd        Review of Systems   Constitutional: Negative for chills, diaphoresis, fever and malaise/fatigue.   Eyes: Negative for discharge and redness.   Respiratory: Negative for cough, shortness of breath and wheezing.    Cardiovascular: Positive for leg swelling (chronic ). Negative for chest pain.   Gastrointestinal: Negative for abdominal pain, diarrhea, nausea and vomiting.   Genitourinary: Negative for flank pain and hematuria.   Musculoskeletal: Positive for joint pain. Negative for back pain and neck pain.   Neurological: Positive for weakness. Negative for tremors, sensory change, speech change and focal weakness.   Psychiatric/Behavioral: Negative for hallucinations and substance abuse.      Physical Exam  Laboratory/Imaging   Hemodynamics  Temp (24hrs), Av.3 °C (97.3 °F), Min:36.1 °C (97 °F), Max:36.6 °C (97.9 °F)   Temperature: 36.2 °C (97.1 °F)  Pulse  Av.5  Min: 60  Max: 108    Blood Pressure: 122/66      Respiratory      Respiration: 16, Pulse Oximetry: 98 %        RUL Breath Sounds: Clear, RML Breath Sounds: Diminished, RLL Breath Sounds: Diminished, ELIAS Breath Sounds: Clear, LLL Breath Sounds: Diminished    Fluids    Intake/Output Summary (Last 24 hours) at 18 1536  Last data filed at 18 0800   Gross per 24 hour   Intake              360 ml   Output              250 ml   Net              110 ml       Nutrition  Orders Placed This Encounter   Procedures   • Diet  Order     Standing Status:   Standing     Number of Occurrences:   1     Order Specific Question:   Diet:     Answer:   Diabetic [3]     Physical Exam   Constitutional: He is oriented to person, place, and time. No distress.   HENT:   Head: Normocephalic and atraumatic.   Nose: Nose normal.   Eyes: Conjunctivae and EOM are normal. No scleral icterus.   Neck: Normal range of motion. No JVD present.   Cardiovascular: Normal rate and regular rhythm.    No murmur heard.  Pulmonary/Chest: Breath sounds normal. No stridor. He has no wheezes. He has no rales.   Abdominal: Soft. Bowel sounds are normal. He exhibits no distension. There is no tenderness.   Obese.    Musculoskeletal: He exhibits edema and tenderness.   Right medial foot dressing exposed with findings of  dry ulcer. Mild surrounding redness. Great toe amputated   Mod swelling    Neurological: He is alert and oriented to person, place, and time. No cranial nerve deficit.   Skin: Skin is warm and dry. He is not diaphoretic. No pallor.   Vitals reviewed.      Recent Labs      03/15/18   1710  03/16/18   0345  03/17/18   0237   WBC  13.3*  12.1*  11.1*   RBC  4.90  4.42*  4.15*   HEMOGLOBIN  14.1  12.4*  11.8*   HEMATOCRIT  42.2  37.6*  36.4*   MCV  86.1  85.1  87.7   MCH  28.8  28.1  28.4   MCHC  33.4*  33.0*  32.4*   RDW  46.2  44.6  46.7   PLATELETCT  310  276  251   MPV  11.2  11.1  11.1     Recent Labs      03/15/18   1710  03/16/18   0345  03/17/18   0237   SODIUM  135  137  138   POTASSIUM  4.4  3.3*  3.6   CHLORIDE  99  104  104   CO2  27  24  26   GLUCOSE  169*  168*  144*   BUN  16  13  15   CREATININE  0.71  0.71  0.76   CALCIUM  9.3  8.5  8.6                      Assessment/Plan     * Diabetic infection of right foot (CMS-HCC)- (present on admission)   Assessment & Plan    Continue IV vanco rocephin- Hx of redmans synd- pre med with IV benadryl and tylenol, reduce rate of infusion .  Anticipate can de escalate to oral bactrim, doxy for dc.   Wound  care  Prn ultram  Glycemic control   dw ortho - no planned I+D for now.           N&V (nausea and vomiting)   Assessment & Plan    Possible GPS, abd exam benign-- improved   Continue tighten glycemic control  Prn zofran , sched reglan.   Ada diet- downgrade to clears if worsened.         Type 2 diabetes mellitus (CMS-HCC)- (present on admission)   Assessment & Plan    w hyperglycemia  Monitor serial accuchecks, SSI as needed.   No planned I contrast study-- start metformin           HTN (hypertension)- (present on admission)   Assessment & Plan    controlled  cw amlodipine, metoprolol  Monitor bp        Atrial fibrillation (CMS-HCC)- (present on admission)   Assessment & Plan    Controlled rate  cw lopressor, digoxin.    Xarelto         HLD (hyperlipidemia)- (present on admission)   Assessment & Plan    Continue statin        Obesity- (present on admission)   Assessment & Plan    Recommend weight loss and dietary modification.         Discussed w Ortho and RN plan of care.    Quality-Core Measures

## 2018-03-18 NOTE — PROGRESS NOTES
Pharmacy Kinetics 53 y.o. male on vancomycin day # 4 3/18/2018    Currently Dose: Vancomycin 2000 mg iv q24hr (~15 mg/kg)  Received Load Dose: Yes     Indication for Treatment: SSTI  ID Service Following: No     Pertinent history per medical record: Admitted on 3/15/2018 for worsening wound infection.  Patient has a history of R foot wound, s/p I&D and partial amputation on 18. The wound was noted to be erythematous and warm to the touch on 3/15, prompting presentation to the ED. Antibiotics initiated for SSTI. Orthopedics consulting.     Other antibiotics: ceftriaxone 2 gm iv q24h     Allergies: Daptomycin; Pcn [penicillins]; Unasyn [ampicillin-sulbactam sodium]; and Vancomycin      List concerns for accumulation of vancomycin: BMI ~40, electrolyte derangement, BUN:SCr ~20:1     Pertinent cultures to date:   3/16/18 wound culture x2 (staph aureus - pending sensitivity)  3/15/18 blood culture x2 (NGTD)  18 wound culture right foot x2 (MRSA; Candida albicans)  18 wound culture right foot x2 (MRSA; Proteus mirabilis)    Recent Labs      03/15/18   1710  18   0345  18   0237   WBC  13.3*  12.1*  11.1*   NEUTSPOLYS  66.00   --   62.40     Recent Labs      03/15/18   1710  18   0345  18   0237   BUN  16  13  15   CREATININE  0.71  0.71  0.76   ALBUMIN  4.0   --    --      Recent Labs      18   1727   VANCOTROUGH  13.1     Intake/Output Summary (Last 24 hours) at 18 0921  Last data filed at 18 0800   Gross per 24 hour   Intake              360 ml   Output              250 ml   Net              110 ml      Blood pressure 122/66, pulse 67, temperature 36.2 °C (97.1 °F), resp. rate 16, height 1.829 m (6'), weight (!) 133.4 kg (294 lb), SpO2 98 %. Temp (24hrs), Av.3 °C (97.3 °F), Min:36.1 °C (97 °F), Max:36.6 °C (97.9 °F)      A/P   1. Vancomycin dose change: No change indicated  2. Next vancomycin level: 3/20/18 @ 1730 (steady state)  3. Goal trough: 12-16  mcg/mL  4. Comments: Vancomycin trough drawn appropriately. Level at goal despite not being at steady state yet. Will not adjust dose yet, but check level at steady state in 2 days. Orthopedics has seen patient, no plan mentioned at this time if repeating I&D or not. Renal indices appear adequate, however limited tracking of urine output in the last 24 hours. Pharmacy will continue to follow and adjust dosing as appropriate.     Handy Mike, XeniaD

## 2018-03-18 NOTE — CARE PLAN
Problem: Communication  Goal: The ability to communicate needs accurately and effectively will improve    Intervention: Educate patient and significant other/support system about the plan of care, procedures, treatments, medications and allow for questions  POC discussed with pt.       Problem: Pain Management  Goal: Pain level will decrease to patient's comfort goal    Intervention: Follow pain managment plan developed in collaboration with patient and Interdisciplinary Team  Pt reports 3/10 pain only requiring PRN tylenol once.

## 2018-03-18 NOTE — PROGRESS NOTES
LIMB PRESERVATION SERVICE NOTE:    Wound(s): Right foot first ray amputation S/P  Irrigation and debridement, multiloculated abscess, 2/19/18 right foot.  Allergies: Daptomycin; Pcn [penicillins]; Unasyn [ampicillin-sulbactam sodium]; and Vancomycin  Start of Care: 3/18/2018  Room/Bed  T329/00      Subjective:      History of Present Illness:   Past Medical History:   Diagnosis Date   • A-fib (CMS-HCC)    • Chest pain    • Diabetes (CMS-HCC)    • Diabetic neuropathy (CMS-HCC)    • Hypercholesterolemia    • Hypertension    • Morbid obesity (CMS-HCC)    • Noncompliance    • Normal cardiac stress test        History of Present Illness:   Past Medical History        Past Medical History:   Diagnosis Date   • A-fib (CMS-HCC)     • Chest pain     • Diabetes (CMS-HCC)     • Diabetic neuropathy (CMS-HCC)     • Hypercholesterolemia     • Hypertension     • Morbid obesity (CMS-HCC)     • Noncompliance     • Normal cardiac stress test              Patient is a 53 y.o. male. Admitted for Diabetic foot infection (CMS-HCC),      Patient is well known to LPS and outpatient wound care services.       Patient presented with S/p R great toe amputation by Dr. Leo on 11/10/17 for infection causing sepsis. Right foot first ray amputation. S/P  Irrigation and debridement, multiloculated abscess, 2/19/18 .   3/18/18 Contacted by Pt because he was concerned about output of wound. The proximal end of 1st Ray wound had a scab which has now resolved to a pink red wound bed with less slough.        Pain:        Patient resting comfortably, Pain on dressing change/palpation.        Objective:           PHYSICAL EXAMINATION:      General Appearance:  Well developed,  nourished, in no acute distress     Sensory Assessment        Patient sensation insensate bilaterally with light touch 10 of 10 points        Vascular Assessment        +2 DP, +2 PT bilaterally     Orthotic, protective, supportive devices:      Offloading  Patient with [place]  boot at bedside Right LE,     Vitals    /66   Pulse 67   Temp 36.2 °C (97.1 °F)   Resp 16   Ht 1.829 m (6')   Wt (!) 133.4 kg (294 lb)   SpO2 98%   BMI 39.87 kg/m²   Wound Characteristics                                                    Location: Right TMA    Initial Evaluation  Date:3/16/2018 3/18/18   Tissue Type and %: 80% Yellow Slough, 20% Red 80% Red, 20% Yellow Slough   Periwound: Red Red   Drainage: Scant Purulent Scant Purulant   Exposed structures None None   Wound Edges:   Attached Attached   Odor: None None   S&S of Infection:   Edema/Erythema Edema/Erythema   Edema: Localized at Site Localized at Site   Sensation: Insensate Insensate               Measurements: Initial Evaluation  Date:3/16/2018   Length (cm) 1.5   Width (cm) 1.5   Depth (cm) 0.5   Tract/undermine            Tests and Measures:    Labs  Recent Labs      03/15/18   1710  03/16/18   0345  03/17/18   0237   WBC  13.3*  12.1*  11.1*   RBC  4.90  4.42*  4.15*   HEMOGLOBIN  14.1  12.4*  11.8*   HEMATOCRIT  42.2  37.6*  36.4*   MCV  86.1  85.1  87.7   MCH  28.8  28.1  28.4   MCHC  33.4*  33.0*  32.4*   RDW  46.2  44.6  46.7   PLATELETCT  310  276  251   MPV  11.2  11.1  11.1     Recent Labs      03/15/18   1710  03/16/18   0345  03/17/18   0237   SODIUM  135  137  138   POTASSIUM  4.4  3.3*  3.6   CHLORIDE  99  104  104   CO2  27  24  26   GLUCOSE  169*  168*  144*   BUN  16  13  15     A1C 5.9 % managed diabetic  ESR: 35  CRP: 1.68     SHAYAN     R: 1.09             L: 1.15     RLE: Triphasic/triphasic   LLE: Triphasic/triphasic      Bilateral:   Doppler waveforms of the common femoral artery are of high amplitude and    triphasic.    Doppler waveforms at the ankle are brisk and triphasic.    Ankle-brachial indices are normal.      Imaging     X-Ray: right foot 3/15/18  Impression        1.  No plain film evidence of right foot osteomyelitis.    2.  Diffuse soft tissue swelling.    3.  Amputation of the right 1st ray at the level  of the proximal metaphysis of the 1st metatarsal.            Infection Management  Microbiology neg blood     Antibiotics rocephin/Vanco reduced rate due to omkar       Procedures:                Debridement :  NA              Cleansed with:     NS                                                                                Periwound protected with: skin prep              Primary dressing: hydrofera blue ready              Secondary Dressing: foam              Other: tape     Procedures/Frequency:     Sutures Removed with scissors and forceps:   Normal Saline and guaze were used to cleanse the wound and periwound. Sutures were then removed. The patient tolerated the procedure well with little to no discomfort or pain with only scant bleeding from the wound bed.  The area was once again cleansed with normal saline revealing 80% yellow slough and 20% red/pink tissue.      NURSING TO CHANGE RIGHT FOOT DRESSING EVERY 72 HOURS AND PRN FOR SATURATION OR DISLODGEMENT  Nursing to cleanse wound/periwound with Normal Saline.  Pat periwound dry and apply skin prep/No Sting to periwound.  Let air dry for 1-2 minutes. Apply Hydrofera Blue Ready with print side facing away from the patient.  Cover with non adhesive foam, cut to size, and secure with hypofix tape  Please take Weekly Wound Photos. Notify wound team if wound deteriorates or fails to progress.     Patient Education: Implications of loss of protective sensation (LOPS) discussed with patient- including increased risk for amputation.  Advised to check foot at least daily, moisturize foot, and to always wear protective foot wear.        Professional Collaboration: bedside RN, ID , Dr Lopez/ASHOK Morse  Assessment:      Wound etiology: Surgical     Wound Progress:  The proximal end of Right 1st Ray amp - 80% Red, 20% Yellow Slough - Wound . Wound resolving. Smaller margins of erythema and edema. Output of wound is less since last assessment as  well.     Rationale for Treatment: Hydrofera Blue Ready: to manage bioburden and absorb exudate.     Patient tolerance/compliance: Open to Education     Complicating factors: DM, Obesity     Need for ongoing  Wound Care: Nursing to do wound care, LPS will follow     Goals: Decreased wound size 2% each week -- 100% granulation in 2 weeks       Plan:      Treatment Plan and Recommendations:    Nursing to complete wound care, LPS to follow  Offloading Boot in use.  AB per ID  D/W Pt POC wound Improving with meds and wound care       Anticipated discharge plans (X):  SNF:           Home Care:           Outpatient Wound Center:   X         Self Care:            Other:           TBD:

## 2018-03-19 LAB
BASOPHILS # BLD AUTO: 0.5 % (ref 0–1.8)
BASOPHILS # BLD: 0.05 K/UL (ref 0–0.12)
EOSINOPHIL # BLD AUTO: 0.54 K/UL (ref 0–0.51)
EOSINOPHIL NFR BLD: 5.1 % (ref 0–6.9)
ERYTHROCYTE [DISTWIDTH] IN BLOOD BY AUTOMATED COUNT: 46.5 FL (ref 35.9–50)
GLUCOSE BLD-MCNC: 157 MG/DL (ref 65–99)
GLUCOSE BLD-MCNC: 170 MG/DL (ref 65–99)
GLUCOSE BLD-MCNC: 173 MG/DL (ref 65–99)
GLUCOSE BLD-MCNC: 193 MG/DL (ref 65–99)
HCT VFR BLD AUTO: 38 % (ref 42–52)
HGB BLD-MCNC: 12.5 G/DL (ref 14–18)
IMM GRANULOCYTES # BLD AUTO: 0.05 K/UL (ref 0–0.11)
IMM GRANULOCYTES NFR BLD AUTO: 0.5 % (ref 0–0.9)
LYMPHOCYTES # BLD AUTO: 2.39 K/UL (ref 1–4.8)
LYMPHOCYTES NFR BLD: 22.4 % (ref 22–41)
MCH RBC QN AUTO: 29.1 PG (ref 27–33)
MCHC RBC AUTO-ENTMCNC: 32.9 G/DL (ref 33.7–35.3)
MCV RBC AUTO: 88.4 FL (ref 81.4–97.8)
MONOCYTES # BLD AUTO: 0.72 K/UL (ref 0–0.85)
MONOCYTES NFR BLD AUTO: 6.7 % (ref 0–13.4)
NEUTROPHILS # BLD AUTO: 6.93 K/UL (ref 1.82–7.42)
NEUTROPHILS NFR BLD: 64.8 % (ref 44–72)
NRBC # BLD AUTO: 0 K/UL
NRBC BLD-RTO: 0 /100 WBC
PLATELET # BLD AUTO: 227 K/UL (ref 164–446)
PMV BLD AUTO: 11.4 FL (ref 9–12.9)
RBC # BLD AUTO: 4.3 M/UL (ref 4.7–6.1)
WBC # BLD AUTO: 10.7 K/UL (ref 4.8–10.8)

## 2018-03-19 PROCEDURE — 700111 HCHG RX REV CODE 636 W/ 250 OVERRIDE (IP): Performed by: INTERNAL MEDICINE

## 2018-03-19 PROCEDURE — 770001 HCHG ROOM/CARE - MED/SURG/GYN PRIV*

## 2018-03-19 PROCEDURE — 700102 HCHG RX REV CODE 250 W/ 637 OVERRIDE(OP): Performed by: INTERNAL MEDICINE

## 2018-03-19 PROCEDURE — 82962 GLUCOSE BLOOD TEST: CPT

## 2018-03-19 PROCEDURE — A9270 NON-COVERED ITEM OR SERVICE: HCPCS | Performed by: INTERNAL MEDICINE

## 2018-03-19 PROCEDURE — A9270 NON-COVERED ITEM OR SERVICE: HCPCS | Performed by: HOSPITALIST

## 2018-03-19 PROCEDURE — 36415 COLL VENOUS BLD VENIPUNCTURE: CPT

## 2018-03-19 PROCEDURE — 85025 COMPLETE CBC W/AUTO DIFF WBC: CPT

## 2018-03-19 PROCEDURE — 99232 SBSQ HOSP IP/OBS MODERATE 35: CPT | Performed by: HOSPITALIST

## 2018-03-19 PROCEDURE — 700102 HCHG RX REV CODE 250 W/ 637 OVERRIDE(OP): Performed by: HOSPITALIST

## 2018-03-19 RX ORDER — SULFAMETHOXAZOLE AND TRIMETHOPRIM 800; 160 MG/1; MG/1
1 TABLET ORAL EVERY 12 HOURS
Status: DISCONTINUED | OUTPATIENT
Start: 2018-03-19 | End: 2018-03-20 | Stop reason: HOSPADM

## 2018-03-19 RX ORDER — SULFAMETHOXAZOLE AND TRIMETHOPRIM 800; 160 MG/1; MG/1
1 TABLET ORAL 2 TIMES DAILY
Qty: 18 TAB | Refills: 0 | Status: SHIPPED | OUTPATIENT
Start: 2018-03-19 | End: 2018-03-27

## 2018-03-19 RX ORDER — HYDROCHLOROTHIAZIDE 25 MG/1
25 TABLET ORAL DAILY
Qty: 30 TAB | Refills: 1 | Status: SHIPPED | OUTPATIENT
Start: 2018-03-19 | End: 2018-04-24 | Stop reason: SDUPTHER

## 2018-03-19 RX ORDER — DOXYCYCLINE 100 MG/1
100 CAPSULE ORAL 2 TIMES DAILY
Qty: 18 CAP | Refills: 0 | Status: SHIPPED | OUTPATIENT
Start: 2018-03-19 | End: 2018-03-27

## 2018-03-19 RX ORDER — L. ACIDOPHILUS/L.BULGARICUS 100MM CELL
1 GRANULES IN PACKET (EA) ORAL
Qty: 90 PACKET | Refills: 0 | Status: SHIPPED | OUTPATIENT
Start: 2018-03-19 | End: 2018-04-18

## 2018-03-19 RX ORDER — DOXYCYCLINE 100 MG/1
100 TABLET ORAL EVERY 12 HOURS
Status: DISCONTINUED | OUTPATIENT
Start: 2018-03-19 | End: 2018-03-20 | Stop reason: HOSPADM

## 2018-03-19 RX ADMIN — LISINOPRIL 40 MG: 20 TABLET ORAL at 09:31

## 2018-03-19 RX ADMIN — AMLODIPINE BESYLATE 10 MG: 10 TABLET ORAL at 09:31

## 2018-03-19 RX ADMIN — LACTOBACILLUS ACIDOPHILUS / LACTOBACILLUS BULGARICUS 1 PACKET: 100 MILLION CFU STRENGTH GRANULES at 08:00

## 2018-03-19 RX ADMIN — INSULIN HUMAN 1 UNITS: 100 INJECTION, SOLUTION PARENTERAL at 21:22

## 2018-03-19 RX ADMIN — METOPROLOL TARTRATE 100 MG: 100 TABLET, FILM COATED ORAL at 14:00

## 2018-03-19 RX ADMIN — FAMOTIDINE 20 MG: 20 TABLET, FILM COATED ORAL at 09:31

## 2018-03-19 RX ADMIN — METOCLOPRAMIDE HYDROCHLORIDE 10 MG: 10 TABLET ORAL at 06:05

## 2018-03-19 RX ADMIN — ASPIRIN 81 MG: 81 TABLET, COATED ORAL at 09:31

## 2018-03-19 RX ADMIN — DIGOXIN 250 MCG: 250 TABLET ORAL at 09:31

## 2018-03-19 RX ADMIN — FAMOTIDINE 20 MG: 20 TABLET, FILM COATED ORAL at 21:19

## 2018-03-19 RX ADMIN — POTASSIUM CHLORIDE 20 MEQ: 1500 TABLET, EXTENDED RELEASE ORAL at 09:31

## 2018-03-19 RX ADMIN — METOPROLOL TARTRATE 100 MG: 100 TABLET, FILM COATED ORAL at 06:04

## 2018-03-19 RX ADMIN — INSULIN HUMAN 1 UNITS: 100 INJECTION, SOLUTION PARENTERAL at 06:05

## 2018-03-19 RX ADMIN — SULFAMETHOXAZOLE AND TRIMETHOPRIM 1 TABLET: 800; 160 TABLET ORAL at 21:19

## 2018-03-19 RX ADMIN — INSULIN HUMAN 1 UNITS: 100 INJECTION, SOLUTION PARENTERAL at 12:11

## 2018-03-19 RX ADMIN — CEFTRIAXONE 2 G: 2 INJECTION, POWDER, FOR SOLUTION INTRAMUSCULAR; INTRAVENOUS at 09:37

## 2018-03-19 RX ADMIN — DOXYCYCLINE 100 MG: 100 TABLET ORAL at 21:19

## 2018-03-19 RX ADMIN — RIVAROXABAN 20 MG: 20 TABLET, FILM COATED ORAL at 18:00

## 2018-03-19 RX ADMIN — PRAVASTATIN SODIUM 20 MG: 10 TABLET ORAL at 21:19

## 2018-03-19 ASSESSMENT — ENCOUNTER SYMPTOMS
COUGH: 0
ROS GI COMMENTS: IMPROVED.
CHILLS: 0
ABDOMINAL PAIN: 0
FEVER: 0
HALLUCINATIONS: 0
FOCAL WEAKNESS: 0
WHEEZING: 0
DIARRHEA: 0
NECK PAIN: 0
WEAKNESS: 1
VOMITING: 1
SHORTNESS OF BREATH: 0
SPEECH CHANGE: 0
DIZZINESS: 0
NAUSEA: 1
SENSORY CHANGE: 0
FLANK PAIN: 0
BACK PAIN: 0
PALPITATIONS: 0
DIAPHORESIS: 0
HEADACHES: 0

## 2018-03-19 ASSESSMENT — PAIN SCALES - GENERAL
PAINLEVEL_OUTOF10: 3

## 2018-03-19 ASSESSMENT — LIFESTYLE VARIABLES: SUBSTANCE_ABUSE: 0

## 2018-03-19 NOTE — PROGRESS NOTES
"Brief Ortho PA note  No interval worsening. Mild improvement.   Up in chair.  /71   Pulse 60   Temp 36.3 °C (97.4 °F)   Resp 17   Ht 1.829 m (6')   Wt (!) 133.4 kg (294 lb)   SpO2 95%   BMI 39.87 kg/m²     Dressing changed. Images shared with Surgery.  Despite patient's concerns that \"only surgery will keep this from getting worse\" we believe he is improving clinically and may defer surgical options at present.    A Cellulitis foot  P Continue IV Abx and excellent medical and nursing care  D/W Dr Lopez and Dr Warner  Will return if needed [phone]  "

## 2018-03-19 NOTE — CARE PLAN
Problem: Safety  Goal: Will remain free from injury  Outcome: PROGRESSING AS EXPECTED  Patient has remained free from further injury this shift    Problem: Pain Management  Goal: Pain level will decrease to patient's comfort goal  Outcome: PROGRESSING AS EXPECTED  Patient states he is comfortable and has been able to sleep this shift

## 2018-03-19 NOTE — PROGRESS NOTES
Pharmacy Kinetics 53 y.o. male on vancomycin day # 5  3/19/2018    Currently Dose: Vancomycin 2000 mg iv q24hr (~15 mg/kg)    Indication for Treatment: SSTI  ID Service Following: No    Pertinent history per medical record: Admitted on 3/15/2018 for worsening wound infection.  Patient has a history of R foot wound, s/p I&D and partial amputation on 2/19/18. The wound was noted to be erythematous and warm to the touch on 3/15, prompting presentation to the ED. Antibiotics initiated for SSTI. Orthopedics consulting.    Other antibiotics: ceftriaxone 2 gm iv q24h    Allergies: Daptomycin; Pcn [penicillins]; Unasyn [ampicillin-sulbactam sodium]; and Vancomycin     List concerns for accumulation of vancomycin: BMI ~40, electrolyte derangement, BUN:SCr ~20:1    Pertinent cultures to date:   Results     Procedure Component Value Units Date/Time    CULTURE WOUND W/ GRAM STAIN [[id number]]  (Abnormal)  (Susceptibility) Collected:  03/16/18 1544    Order Status:  Completed Specimen:  Wound from Right Foot Updated:  03/18/18 1125     Significant Indicator POS (POS)     Source WND     Site RIGHT FOOT     Culture Result Wound -- (A)     Gram Stain Result Rare WBCs.  Few Gram positive cocci.       Culture Result Wound Methicillin Resistant Staphylococcus aureus  Heavy growth  This isolate is presumed to be clindamycin resistant based on  detection of inducible resistance.  Clindamycin may still  be effective in some patients.   (A)    Narrative:       Collected By:[id number] BOYD FIGUEROA    Culture & Susceptibility     METHICILLIN RESISTANT STAPHYLOCOCCUS AUREUS     Antibiotic Sensitivity Microscan Unit Status    Ampicillin/sulbactam Resistant 16/8 mcg/mL Final    Clindamycin Resistant <=0.5 mcg/mL Final    Daptomycin Sensitive <=0.5 mcg/mL Final    Erythromycin Resistant >4 mcg/mL Final    Moxifloxacin Resistant >4 mcg/mL Final    Oxacillin Resistant >2 mcg/mL Final    Penicillin Resistant >8 mcg/mL Final    Tetracycline Sensitive  "<=4 mcg/mL Final    Trimeth/Sulfa Sensitive <=0.5/9.5 mcg/mL Final    Vancomycin Sensitive 2 mcg/mL Final                       GRAM STAIN [[id number]] Collected:  03/16/18 1544    Order Status:  Completed Specimen:  Wound Updated:  03/17/18 0826     Significant Indicator .     Source WND     Site RIGHT FOOT     Gram Stain Result Rare WBCs.  Few Gram positive cocci.      Narrative:       Collected By:[id number] BOYD FIGUEROA    Urinalysis [[id number]]  (Abnormal) Collected:  03/16/18 1309    Order Status:  Completed Specimen:  Urine Updated:  03/16/18 1332     Color Yellow     Character Cloudy (A)     Specific Gravity 1.025     Ph 5.5     Glucose Negative mg/dL      Ketones Negative mg/dL      Protein 30 (A) mg/dL      Bilirubin Negative     Urobilinogen, Urine 1.0     Nitrite Negative     Leukocyte Esterase Negative     Occult Blood Negative     Micro Urine Req Microscopic    Narrative:       If not done within the last 24 hours    BLOOD CULTURE [[id number]] Collected:  03/15/18 1710    Order Status:  Completed Specimen:  Blood from Peripheral Updated:  03/16/18 0726     Significant Indicator NEG     Source BLD     Site PERIPHERAL     Blood Culture No Growth    Note: Blood cultures are incubated for 5 days and  are monitored continuously.Positive blood cultures  are called to the RN and reported as soon as  they are identified.      Narrative:       Per Hospital Policy: Only change Specimen Src: to \"Line\" if  specified by physician order.    BLOOD CULTURE [[id number]] Collected:  03/15/18 1743    Order Status:  Completed Specimen:  Blood from Peripheral Updated:  03/16/18 0726     Significant Indicator NEG     Source BLD     Site PERIPHERAL     Blood Culture No Growth    Note: Blood cultures are incubated for 5 days and  are monitored continuously.Positive blood cultures  are called to the RN and reported as soon as  they are identified.      Narrative:       Per Hospital Policy: Only change Specimen Src: to \"Line\" " if  specified by physician order.    Blood Culture [[id number]] Collected:  18 0000    Order Status:  Canceled Specimen:  Other from Peripheral     Blood Culture [[id number]] Collected:  18 0000    Order Status:  Canceled Specimen:  Other from Peripheral     Blood Culture [[id number]]     Order Status:  Canceled Specimen:  Blood from Peripheral     Blood Culture [[id number]]     Order Status:  Canceled Specimen:  Blood from Peripheral     Culture Respiratory W/ GRM STN [[id number]] Collected:  03/15/18 0000    Order Status:  Canceled Specimen:  Sputum from Sputum         Recent Labs      18   0237  18   0310   WBC  11.1*  10.7   NEUTSPOLYS  62.40  64.80     Recent Labs      18   0237   BUN  15   CREATININE  0.76     Recent Labs      18   1727   VANCOTROUGH  13.1     Blood pressure 137/87, pulse 70, temperature 36.3 °C (97.3 °F), resp. rate 16, height 1.829 m (6'), weight (!) 133.4 kg (294 lb), SpO2 90 %. Temp (24hrs), Av.2 °C (97.2 °F), Min:36.1 °C (97 °F), Max:36.3 °C (97.4 °F)    Estimated Creatinine Clearance: 155.2 mL/min (by C-G formula based on SCr of 0.76 mg/dL).    A/P   1. Vancomycin dose change: not indicated   2. Next vancomycin level: 3/20/18 @1730  3. Goal trough: 12-16 mcg/mL  4. Comments: VS stable. Afebrile. WBC stable. CrCl ~155 mL/min and inaccurate due to body habitus (SCr stable). Microbiology noted above. Factors for accumulation noted. Given factors trough in place 3/20/18 to ensure continued clearance. Pharmacy will follow and continue to adjust as appropriate.    Eric Peacock, PharmD

## 2018-03-20 ENCOUNTER — PATIENT OUTREACH (OUTPATIENT)
Dept: HEALTH INFORMATION MANAGEMENT | Facility: OTHER | Age: 53
End: 2018-03-20

## 2018-03-20 ENCOUNTER — APPOINTMENT (OUTPATIENT)
Dept: WOUND CARE | Facility: MEDICAL CENTER | Age: 53
End: 2018-03-20
Attending: HOSPITALIST
Payer: MEDICAID

## 2018-03-20 VITALS
HEIGHT: 72 IN | TEMPERATURE: 96.9 F | HEART RATE: 59 BPM | OXYGEN SATURATION: 95 % | BODY MASS INDEX: 39.82 KG/M2 | WEIGHT: 294 LBS | DIASTOLIC BLOOD PRESSURE: 78 MMHG | RESPIRATION RATE: 16 BRPM | SYSTOLIC BLOOD PRESSURE: 127 MMHG

## 2018-03-20 LAB
ANION GAP SERPL CALC-SCNC: 10 MMOL/L (ref 0–11.9)
BACTERIA BLD CULT: NORMAL
BACTERIA BLD CULT: NORMAL
BUN SERPL-MCNC: 14 MG/DL (ref 8–22)
CALCIUM SERPL-MCNC: 9.1 MG/DL (ref 8.5–10.5)
CHLORIDE SERPL-SCNC: 104 MMOL/L (ref 96–112)
CO2 SERPL-SCNC: 22 MMOL/L (ref 20–33)
CREAT SERPL-MCNC: 0.62 MG/DL (ref 0.5–1.4)
GLUCOSE BLD-MCNC: 140 MG/DL (ref 65–99)
GLUCOSE BLD-MCNC: 161 MG/DL (ref 65–99)
GLUCOSE SERPL-MCNC: 156 MG/DL (ref 65–99)
POTASSIUM SERPL-SCNC: 3.9 MMOL/L (ref 3.6–5.5)
SIGNIFICANT IND 70042: NORMAL
SIGNIFICANT IND 70042: NORMAL
SITE SITE: NORMAL
SITE SITE: NORMAL
SODIUM SERPL-SCNC: 136 MMOL/L (ref 135–145)
SOURCE SOURCE: NORMAL
SOURCE SOURCE: NORMAL

## 2018-03-20 PROCEDURE — 700102 HCHG RX REV CODE 250 W/ 637 OVERRIDE(OP): Performed by: INTERNAL MEDICINE

## 2018-03-20 PROCEDURE — 99239 HOSP IP/OBS DSCHRG MGMT >30: CPT | Mod: GC | Performed by: INTERNAL MEDICINE

## 2018-03-20 PROCEDURE — 700102 HCHG RX REV CODE 250 W/ 637 OVERRIDE(OP): Performed by: HOSPITALIST

## 2018-03-20 PROCEDURE — 82962 GLUCOSE BLOOD TEST: CPT

## 2018-03-20 PROCEDURE — A9270 NON-COVERED ITEM OR SERVICE: HCPCS | Performed by: INTERNAL MEDICINE

## 2018-03-20 PROCEDURE — 80048 BASIC METABOLIC PNL TOTAL CA: CPT

## 2018-03-20 PROCEDURE — A9270 NON-COVERED ITEM OR SERVICE: HCPCS | Performed by: HOSPITALIST

## 2018-03-20 PROCEDURE — 36415 COLL VENOUS BLD VENIPUNCTURE: CPT

## 2018-03-20 RX ORDER — ERGOCALCIFEROL 1.25 MG/1
50000 CAPSULE ORAL
Qty: 8 CAP | Refills: 0 | Status: SHIPPED | OUTPATIENT
Start: 2018-03-20 | End: 2018-04-24 | Stop reason: SDUPTHER

## 2018-03-20 RX ADMIN — METOPROLOL TARTRATE 100 MG: 100 TABLET, FILM COATED ORAL at 05:53

## 2018-03-20 RX ADMIN — ASPIRIN 81 MG: 81 TABLET, COATED ORAL at 07:29

## 2018-03-20 RX ADMIN — METOPROLOL TARTRATE 100 MG: 100 TABLET, FILM COATED ORAL at 00:29

## 2018-03-20 RX ADMIN — DOXYCYCLINE 100 MG: 100 TABLET ORAL at 07:29

## 2018-03-20 RX ADMIN — INSULIN HUMAN 1 UNITS: 100 INJECTION, SOLUTION PARENTERAL at 05:53

## 2018-03-20 RX ADMIN — LACTOBACILLUS ACIDOPHILUS / LACTOBACILLUS BULGARICUS 1 PACKET: 100 MILLION CFU STRENGTH GRANULES at 07:29

## 2018-03-20 RX ADMIN — LACTOBACILLUS ACIDOPHILUS / LACTOBACILLUS BULGARICUS 1 PACKET: 100 MILLION CFU STRENGTH GRANULES at 11:26

## 2018-03-20 RX ADMIN — SULFAMETHOXAZOLE AND TRIMETHOPRIM 1 TABLET: 800; 160 TABLET ORAL at 07:29

## 2018-03-20 RX ADMIN — METOPROLOL TARTRATE 100 MG: 100 TABLET, FILM COATED ORAL at 14:07

## 2018-03-20 RX ADMIN — DIGOXIN 250 MCG: 250 TABLET ORAL at 07:33

## 2018-03-20 RX ADMIN — LISINOPRIL 40 MG: 20 TABLET ORAL at 07:33

## 2018-03-20 RX ADMIN — AMLODIPINE BESYLATE 10 MG: 10 TABLET ORAL at 07:33

## 2018-03-20 RX ADMIN — METOCLOPRAMIDE HYDROCHLORIDE 10 MG: 10 TABLET ORAL at 05:53

## 2018-03-20 RX ADMIN — METOCLOPRAMIDE HYDROCHLORIDE 10 MG: 10 TABLET ORAL at 11:26

## 2018-03-20 RX ADMIN — FAMOTIDINE 20 MG: 20 TABLET, FILM COATED ORAL at 07:29

## 2018-03-20 RX ADMIN — ACETAMINOPHEN 650 MG: 325 TABLET, FILM COATED ORAL at 07:31

## 2018-03-20 RX ADMIN — POTASSIUM CHLORIDE 20 MEQ: 1500 TABLET, EXTENDED RELEASE ORAL at 07:29

## 2018-03-20 ASSESSMENT — PAIN SCALES - GENERAL: PAINLEVEL_OUTOF10: 4

## 2018-03-20 NOTE — PROGRESS NOTES
Report received. Assumed care. Pt in chair awake. A/O x4. VSS. Responds appropriately. Denies pain, SOB. Assessment complete. Dressing to the right foot in place, cdi.  Discussed POC, pain control, abad, JANINE planning wound clinic as outpt, safety, pt verbalizes understanding. Explained importance of calling before getting OOB. Call light and belongings within reach.Pt refuses bed alarm despite education. Bed in the lowest position. Treaded socks in place. Hourly rounding in progress. Will continue to monitor .

## 2018-03-20 NOTE — DISCHARGE PLANNING
Medical Social Work    SW set up appointment with Catie at [facility] wound clinic for today at [address] for 2PM.      Wound care appointment has been changed to Thursday the 22nd at 1PM with a check in at 12:45.

## 2018-03-20 NOTE — DISCHARGE PLANNING
Medical Social Work    SW attempted to get a hold of wound clinic to make sure appt can be scheduled. No answer- VM left. SW updated charge RN that until appt can be verified, pt likely can't d/c. Anticipate d/c tomorrow.    SW also asked about HH, as [facility] note states pt was on HH service prior to admit. Per charge RN, in IDT it was mentioned that pt is not home bound, so pt will not qualify.

## 2018-03-20 NOTE — CARE PLAN
Problem: Safety  Goal: Will remain free from injury  Outcome: PROGRESSING AS EXPECTED  Treaded socks in place, bed in the lowest position,  call light and belongings within reach, pt call for assistance appropriately    Problem: Venous Thromboembolism (VTW)/Deep Vein Thrombosis (DVT) Prevention:  Goal: Patient will participate in Venous Thrombosis (VTE)/Deep Vein Thrombosis (DVT)Prevention Measures  Outcome: PROGRESSING AS EXPECTED  Pt receiving xarelto per MAR, ambulatory in the pt's room    Problem: Pain Management  Goal: Pain level will decrease to patient's comfort goal  Outcome: PROGRESSING AS EXPECTED  Medicated with tylenol for pain with adequate pain control, hourly rounding in progress

## 2018-03-20 NOTE — CARE PLAN
Problem: Safety  Goal: Will remain free from injury  Outcome: PROGRESSING AS EXPECTED  Patient has remained free from further injury this shfit    Problem: Pain Management  Goal: Pain level will decrease to patient's comfort goal  Outcome: PROGRESSING AS EXPECTED  Patient has not complained of pain this shift. Patient has been able to ambulate and sleep with current levels of pain

## 2018-03-20 NOTE — DISCHARGE SUMMARY
[facility] Discharge Summary      Admit Date:  3/15/2018       Discharge Date:   03/20/18    Service:   Hospital Medicine  Attending Physician(s):   Dr. Ledesma       Senior Resident(s):   Dr. Marie    Primary Diagnosis:   Diabetic infection of right foot    Secondary Diagnoses:                Type 2 diabetes mellitus  N&V (nausea and vomiting)  Obesity    HLD (hyperlipidemia)  Atrial fibrillation (CMS-HCC)  HTN (hypertension)    Hospital Summary (Brief Narrative):       Admitted with right foot wound infection. On presentation in the ED he was afebrile, hemodynamically stable. Xray did not show any evidence of osteomyelitis. Labs showed mild leukocytosis and ESR 35, hyperglycemia. R foot R great toe amputation noted, incision area had no discharge but there was surrounding erythema, swelling and mild tenderness. Orthopedics team consulted - (recommended medical management only), wound care consulted as well, and pt was started on Rocephin and Vancomycin.  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  Wound cultures grew MRSA. Antibiotics de-escalated to bactrim and doxy based on sensitivity. Pt was discharged in a stable condition under care of self with a total of 14 days of antibiotics.  Appointments created for wound care clinic, PCP, Cardiology, Ortho, and ID. Pt educated on importance of compliance with his medications and appointments.      Patient /Hospital Summary (Details -- Problem Oriented) :          * Diabetic infection of right foot (CMS-HCC)   Assessment & Plan    MRSA foot infxn w cellulitis.  Xray - no Osteomyelitis . Discussed with ortho - plan non operative treatment - medical management only.   Abx's de escalate to oral bactrim, doxy - complete 14 today days of atbs.  Wound care- arrange for outpatient treatment.   Glycemic control       N&V (nausea and vomiting)   Assessment & Plan    Possible GPS, abd exam benign-- clinically better.   Continue tighten  glycemic control      Type 2 diabetes mellitus (CMS-HCC)   Assessment & Plan    w hyperglycemia.   Educated on importance of diabetes management  Metformin restarted      HTN (hypertension)   Assessment & Plan    controlled  Continue amlodipine, metoprolol      Atrial fibrillation (CMS-HCC)   Assessment & Plan    Controlled rate  Continue digoxin  Continue Xarelto      HLD (hyperlipidemia)   Assessment & Plan    Continue statin      Obesity   Assessment & Plan    Recommend weight loss and dietary modification.           Consultants:     Orthopedic surgery Dr. Lopez    Procedures:        None    Imaging/ Testing:      DX-FOOT-COMPLETE 3+ RIGHT   Final Result      1.  No plain film evidence of right foot osteomyelitis.      2.  Diffuse soft tissue swelling.      3.  Amputation of the right 1st ray at the level of the proximal metaphysis of the 1st metatarsal.            Discharge Medications:       Medication List      START taking these medications      Instructions   doxycycline 100 MG capsule  Commonly known as:  MONODOX   Take 1 Cap by mouth 2 times a day for 9 days.  Dose:  100 mg     hydroCHLOROthiazide 25 MG Tabs  Commonly known as:  HYDRODIURIL   Take 1 Tab by mouth every day.  Dose:  25 mg     lactobacillus granules Pack   Take 1 Packet by mouth 3 times a day, with meals for 30 days.  Dose:  1 Packet     vitamin D (Ergocalciferol) 33956 units Caps capsule  Commonly known as:  DRISDOL   Doctor's comments:  After 8 weeks start taking 2000 units a day every day. Follow up with PCP for evaluation of levels and need for additional adjustments.  Take 1 Cap by mouth every 7 days for 8 doses.  Dose:  35661 Units     vitamin D 1000 UNIT Tabs  Commonly known as:  cholecalciferol   Doctor's comments:  Start 2000 units Vit D after completing 8 weeks of 50,000 units weekly dose  Take 2 Tabs by mouth every day for 30 days.  Dose:  2000 Units        CHANGE how you take these medications      Instructions   *  sulfamethoxazole-trimethoprim 800-160 MG tablet  What changed:  Another medication with the same name was added. Make sure you understand how and when to take each.  Commonly known as:  BACTRIM DS   Take 1 Tab by mouth 2 times a day. Finished 12 day course on 3/12  Dose:  1 Tab     * sulfamethoxazole-trimethoprim 800-160 MG tablet  What changed:  You were already taking a medication with the same name, and this prescription was added. Make sure you understand how and when to take each.  Commonly known as:  BACTRIM DS   Take 1 Tab by mouth 2 times a day for 9 days.  Dose:  1 Tab        * This list has 2 medication(s) that are the same as other medications prescribed for you. Read the directions carefully, and ask your doctor or other care provider to review them with you.            CONTINUE taking these medications      Instructions   acetaminophen 325 MG Tabs  Commonly known as:  TYLENOL   Take 2 Tabs by mouth every 6 hours as needed (Mild Pain; (Pain scale 1-3); Temp greater than 100.5 F).  Dose:  650 mg     aspirin 81 MG EC tablet   Take 1 Tab by mouth every day.  Dose:  81 mg     digoxin 250 MCG Tabs  Commonly known as:  LANOXIN   Take 1 Tab by mouth every day.  Dose:  250 mcg     famotidine 20 MG Tabs  Commonly known as:  PEPCID   Take 1 Tab by mouth 2 times a day.  Dose:  20 mg     lisinopril 40 MG tablet  Commonly known as:  PRINIVIL, ZESTRIL   Take 1 Tab by mouth every day.  Dose:  40 mg     metFORMIN 500 MG Tabs  Commonly known as:  GLUCOPHAGE   Take 2 Tabs by mouth 2 times a day, with meals.  Dose:  1000 mg     metoprolol 100 MG Tabs  Commonly known as:  LOPRESSOR   Take 1 Tab by mouth 2 times a day.  Dose:  100 mg     pravastatin 20 MG Tabs  Commonly known as:  PRAVACHOL   Take 1 Tab by mouth every evening.  Dose:  20 mg     rivaroxaban 20 MG Tabs tablet  Commonly known as:  XARELTO   Take 1 Tab by mouth with dinner.  Dose:  20 mg        STOP taking these medications    amLODIPine 5 MG Tabs  Commonly  known as:  NORVASC            Disposition:   D/c home    Diet:   Diabetic    Activity:   As tolerated. Stay active for weight loss and maintenance of muscle mass and stability    Instructions:        The patient was instructed to return to the ER in the event of worsening symptoms. I have counseled the patient on the importance of compliance and the patient has agreed to proceed with all medical recommendations and follow up plan indicated above.   The patient understands that all medications come with benefits and risks. Risks may include permanent injury or death and these risks can be minimized with close reassessment and monitoring.        Primary Care Provider:  Jerad Lomax M.D.      Follow up appointment details :      Details provided to pt. He was educated on importance of compliance with his medications as well as appointments    Time spent on discharge day patient visit, preparing discharge paperwork and arranging for patient follow up.    Discharge Time (Minutes) :  >45 minutes in pt education, coordinating his medications and appointments, as well as paperwork completion.      Condition on Discharge    ______________________________________________________________________    Interval history/exam for day of discharge:        Vitals:    03/19/18 1600 03/19/18 2000 03/20/18 0400 03/20/18 0800   BP: 147/83 143/90 136/81 127/78   Pulse: 72 70 69 (!) 59   Resp: 14 16 16 16   Temp: 36.2 °C (97.2 °F) 36.1 °C (96.9 °F) 36.2 °C (97.2 °F) 36.1 °C (96.9 °F)   TempSrc:       SpO2: 97% 93% 96% 95%   Weight:       Height:         Weight/BMI: Body mass index is 39.87 kg/m².  Pulse Oximetry: 95 %, O2 (LPM): 0, O2 Delivery: None (Room Air)    General: well appearing in no acute distress  CVS: nl s1/s2, no m/r/g, no JVD, pulses 2+ on all extremities  PULM: ctab, w/o w/r/r  EXT: RLE tenderness significantly improved, no exudate or erythema. No rash  NEURO: able to move all extremities. No focal deficits compared to  baseline.  PSYCH: stable, denies SI/HI    Most Recent Labs:    Lab Results   Component Value Date/Time    WBC 10.7 03/19/2018 03:10 AM    RBC 4.30 (L) 03/19/2018 03:10 AM    HEMOGLOBIN 12.5 (L) 03/19/2018 03:10 AM    HEMATOCRIT 38.0 (L) 03/19/2018 03:10 AM    MCV 88.4 03/19/2018 03:10 AM    MCH 29.1 03/19/2018 03:10 AM    MCHC 32.9 (L) 03/19/2018 03:10 AM    MPV 11.4 03/19/2018 03:10 AM    NEUTSPOLYS 64.80 03/19/2018 03:10 AM    LYMPHOCYTES 22.40 03/19/2018 03:10 AM    MONOCYTES 6.70 03/19/2018 03:10 AM    EOSINOPHILS 5.10 03/19/2018 03:10 AM    BASOPHILS 0.50 03/19/2018 03:10 AM      Lab Results   Component Value Date/Time    SODIUM 136 03/20/2018 04:07 AM    POTASSIUM 3.9 03/20/2018 04:07 AM    CHLORIDE 104 03/20/2018 04:07 AM    CO2 22 03/20/2018 04:07 AM    GLUCOSE 156 (H) 03/20/2018 04:07 AM    BUN 14 03/20/2018 04:07 AM    CREATININE 0.62 03/20/2018 04:07 AM      Lab Results   Component Value Date/Time    ALTSGPT 42 03/15/2018 05:10 PM    ASTSGOT 38 03/15/2018 05:10 PM    ALKPHOSPHAT 84 03/15/2018 05:10 PM    TBILIRUBIN 0.7 03/15/2018 05:10 PM    LIPASE 10 (L) 02/08/2018 02:00 PM    ALBUMIN 4.0 03/15/2018 05:10 PM    GLOBULIN 3.5 03/15/2018 05:10 PM    PREALBUMIN 12.0 (L) 01/15/2018 11:56 AM    INR 2.29 (H) 02/08/2018 02:00 PM     Lab Results   Component Value Date/Time    PROTHROMBTM 24.9 (H) 02/08/2018 02:00 PM    INR 2.29 (H) 02/08/2018 02:00 PM

## 2018-03-20 NOTE — DISCHARGE INSTRUCTIONS
Discharge Instructions    Discharged to home by car with relative. Discharged via wheelchair, hospital escort: Yes.  Special equipment needed: Not Applicable    Be sure to schedule a follow-up appointment with your primary care doctor or any specialists as instructed.     Discharge Plan:   Diet Plan: Discussed  Activity Level: Discussed  Confirmed Follow up Appointment: Patient to Call and Schedule Appointment  Influenza Vaccine Indication: Not indicated: Previously immunized this influenza season and > 8 years of age    I understand that a diet low in cholesterol, fat, and sodium is recommended for good health. Unless I have been given specific instructions below for another diet, I accept this instruction as my diet prescription.   Other diet: Diabetic diet    Special Instructions: Discharge instructions for the Orthopedic Patient    Follow up with [facility] care clinic.  Take medications as prescribed.  Notify MD for any questions or concerns.    Follow up with Primary Care Physician within 2 weeks of discharge to home, regarding:  Review of medications and diagnostic testing.  Surveillance for medical complications.  Workup and treatment of osteoporosis, if appropriate.     -Is this a Joint Replacement patient? No    -Is this patient being discharged with medication to prevent blood clots?  Yes, Xarelto Rivaroxaban oral tablets  What is this medicine?  RIVAROXABAN (ri va JOI a ban) is an anticoagulant (blood thinner). It is used to treat blood clots in the lungs or in the veins. It is also used after knee or hip surgeries to prevent blood clots. It is also used to lower the chance of stroke in people with a medical condition called atrial fibrillation.  This medicine may be used for other purposes; ask your health care provider or pharmacist if you have questions.  COMMON BRAND NAME(S): Xarelto, Xarelto Starter Pack  What should I tell my health care provider before I take this medicine?  They need to know if  you have any of these conditions:  -bleeding disorders  -bleeding in the brain  -blood in your stools (black or tarry stools) or if you have blood in your vomit  -history of stomach bleeding  -kidney disease  -liver disease  -low blood counts, like low white cell, platelet, or red cell counts  -recent or planned spinal or epidural procedure  -take medicines that treat or prevent blood clots  -an unusual or allergic reaction to rivaroxaban, other medicines, foods, dyes, or preservatives  -pregnant or trying to get pregnant  -breast-feeding  How should I use this medicine?  Take this medicine by mouth with a glass of water. Follow the directions on the prescription label. Take your medicine at regular intervals. Do not take it more often than directed. Do not stop taking except on your doctor's advice. Stopping this medicine may increase your risk of a blood clot. Be sure to refill your prescription before you run out of medicine.  If you are taking this medicine after hip or knee replacement surgery, take it with or without food. If you are taking this medicine for atrial fibrillation, take it with your evening meal. If you are taking this medicine to treat blood clots, take it with food at the same time each day. If you are unable to swallow your tablet, you may crush the tablet and mix it in applesauce. Then, immediately eat the applesauce. You should eat more food right after you eat the applesauce containing the crushed tablet.  Talk to your pediatrician regarding the use of this medicine in children. Special care may be needed.  Overdosage: If you think you have taken too much of this medicine contact a poison control center or emergency room at once.  NOTE: This medicine is only for you. Do not share this medicine with others.  What if I miss a dose?  If you take your medicine once a day and miss a dose, take the missed dose as soon as you remember. If you take your medicine twice a day and miss a dose, take  the missed dose immediately. In this instance, 2 tablets may be taken at the same time. The next day you should take 1 tablet twice a day as directed.  What may interact with this medicine?  Do not take this medicine with any of the following medications:  -defibrotide  This medicine may also interact with the following medications:  -aspirin and aspirin-like medicines  -certain antibiotics like erythromycin, azithromycin, and clarithromycin  -certain medicines for fungal infections like ketoconazole and itraconazole  -certain medicines for irregular heart beat like amiodarone, quinidine, dronedarone  -certain medicines for seizures like carbamazepine, phenytoin  -certain medicines that treat or prevent blood clots like warfarin, enoxaparin, and dalteparin  -conivaptan  -diltiazem  -felodipine  -indinavir  -lopinavir; ritonavir  -NSAIDS, medicines for pain and inflammation, like ibuprofen or naproxen  -ranolazine  -rifampin  -ritonavir  -SNRIs, medicines for depression, like desvenlafaxine, duloxetine, levomilnacipran, venlafaxine  -SSRIs, medicines for depression, like citalopram, escitalopram, fluoxetine, fluvoxamine, paroxetine, sertraline  -Garceno's wort  -verapamil  This list may not describe all possible interactions. Give your health care provider a list of all the medicines, herbs, non-prescription drugs, or dietary supplements you use. Also tell them if you smoke, drink alcohol, or use illegal drugs. Some items may interact with your medicine.  What should I watch for while using this medicine?  Visit your doctor or health care professional for regular checks on your progress.  Notify your doctor or health care professional and seek emergency treatment if you develop breathing problems; changes in vision; chest pain; severe, sudden headache; pain, swelling, warmth in the leg; trouble speaking; sudden numbness or weakness of the face, arm or leg. These can be signs that your condition has gotten worse.  If  you are going to have surgery or other procedure, tell your doctor that you are taking this medicine.  What side effects may I notice from receiving this medicine?  Side effects that you should report to your doctor or health care professional as soon as possible:  -allergic reactions like skin rash, itching or hives, swelling of the face, lips, or tongue  -back pain  -redness, blistering, peeling or loosening of the skin, including inside the mouth  -signs and symptoms of bleeding such as bloody or black, tarry stools; red or dark-brown urine; spitting up blood or brown material that looks like coffee grounds; red spots on the skin; unusual bruising or bleeding from the eye, gums, or nose  Side effects that usually do not require medical attention (report to your doctor or health care professional if they continue or are bothersome):  -dizziness  -muscle pain  This list may not describe all possible side effects. Call your doctor for medical advice about side effects. You may report side effects to FDA at 9-924-RTF-7592.  Where should I keep my medicine?  Keep out of the reach of children.  Store at room temperature between 15 and 30 degrees C (59 and 86 degrees F). Throw away any unused medicine after the expiration date.  NOTE: This sheet is a summary. It may not cover all possible information. If you have questions about this medicine, talk to your doctor, pharmacist, or health care provider.  © 2018 [employer] (2017-09-06 16:29:33)      · Is patient discharged on Warfarin / Coumadin?   No       Diabetes and Foot Care  Diabetes may cause you to have problems because of poor blood supply (circulation) to your feet and legs. This may cause the skin on your feet to become thinner, break easier, and heal more slowly. Your skin may become dry, and the skin may peel and crack. You may also have nerve damage in your legs and feet causing decreased feeling in them. You may not notice minor injuries to your  feet that could lead to infections or more serious problems. Taking care of your feet is one of the most important things you can do for yourself.  Follow these instructions at home:  · Wear shoes at all times, even in the house. Do not go barefoot. Bare feet are easily injured.  · Check your feet daily for blisters, cuts, and redness. If you cannot see the bottom of your feet, use a mirror or ask someone for help.  · Wash your feet with warm water (do not use hot water) and mild soap. Then pat your feet and the areas between your toes until they are completely dry. Do not soak your feet as this can dry your skin.  · Apply a moisturizing lotion or petroleum jelly (that does not contain alcohol and is unscented) to the skin on your feet and to dry, brittle toenails. Do not apply lotion between your toes.  · Trim your toenails straight across. Do not dig under them or around the cuticle. File the edges of your nails with an emery board or nail file.  · Do not cut corns or calluses or try to remove them with medicine.  · Wear clean socks or stockings every day. Make sure they are not too tight. Do not wear knee-high stockings since they may decrease blood flow to your legs.  · Wear shoes that fit properly and have enough cushioning. To break in new shoes, wear them for just a few hours a day. This prevents you from injuring your feet. Always look in your shoes before you put them on to be sure there are no objects inside.  · Do not cross your legs. This may decrease the blood flow to your feet.  · If you find a minor scrape, cut, or break in the skin on your feet, keep it and the skin around it clean and dry. These areas may be cleansed with mild soap and water. Do not cleanse the area with peroxide, alcohol, or iodine.  · When you remove an adhesive bandage, be sure not to damage the skin around it.  · If you have a wound, look at it several times a day to make sure it is healing.  · Do not use heating pads or hot  water bottles. They may burn your skin. If you have lost feeling in your feet or legs, you may not know it is happening until it is too late.  · Make sure your health care provider performs a complete foot exam at least annually or more often if you have foot problems. Report any cuts, sores, or bruises to your health care provider immediately.  Contact a health care provider if:  · You have an injury that is not healing.  · You have cuts or breaks in the skin.  · You have an ingrown nail.  · You notice redness on your legs or feet.  · You feel burning or tingling in your legs or feet.  · You have pain or cramps in your legs and feet.  · Your legs or feet are numb.  · Your feet always feel cold.  Get help right away if:  · There is increasing redness, swelling, or pain in or around a wound.  · There is a red line that goes up your leg.  · Pus is coming from a wound.  · You develop a fever or as directed by your health care provider.  · You notice a bad smell coming from an ulcer or wound.  This information is not intended to replace advice given to you by your health care provider. Make sure you discuss any questions you have with your health care provider.  Document Released: 12/15/2001 Document Revised: 05/25/2017 Document Reviewed: 05/27/2014  [employer] Interactive Patient Education © 2017 [employer]      Wound Infection  Introduction  A wound infection happens when germs start to grow in the wound. Germs that cause wound infections are most often bacteria. Other types of infections can occur as well. In some cases, infection can cause the wound to break open. Wound infections need treatment. If a wound infection is not treated, complications can happen.  Follow these instructions at home:  Medicines  · Take or apply over-the-counter and prescription medicines only as told by your doctor.  · If you were prescribed antibiotic medicine, take or apply it as told by your doctor. Do not stop using the antibiotic  even if your condition improves.  Wound care  · Clean the wound each day or as told by your doctor.  ¨ Wash the wound with mild soap and water.  ¨ Rinse the wound with water to remove all soap.  ¨ Pat the wound dry with a clean towel. Do not rub it.  · Follow instructions from your doctor about how to take care of your wound. Make sure you:  ¨ Wash your hands with soap and water before you change your bandage (dressing). If you cannot use soap and water, use hand .  ¨ Change your bandage as told by your doctor.  ¨ Leave stitches (sutures), skin glue, or skin tape (adhesive) strips in place if your wound has been closed. They may need to stay in place for 2 weeks or longer. If tape strips get loose and curl up, you may trim the loose edges. Do not remove tape strips completely unless your doctor says it is okay. Some wounds are left open to heal on their own.  · Check your wound every day for signs of infection. Watch for:  ¨ More redness, swelling, or pain.  ¨ More fluid or blood.  ¨ Warmth.  ¨ Pus or a bad smell.  General instructions  · Keep the bandage dry until your doctor says it can be removed.  · Do not take baths, swim, use a hot tub, or do anything that would put your wound underwater until your doctor says it is okay.  · Raise (elevate) the injured area above the level of your heart while you are sitting or lying down.  · Do not scratch or pick at the wound.  · Keep all follow-up visits as told by your doctor. This is important.  Contact a doctor if:  · Medicine does not help your pain.  · You have more redness, swelling, or pain in the area of your wound.  · You have more fluid or blood coming from your wound.  · Your wound feels warm to the touch.  · You have pus coming from your wound.  · You continue to notice a bad smell coming from your wound or your bandage.  · Your wound that was closed breaks open.  Get help right away if:  · You have a red streak going away from your wound.  · You have a  fever.  This information is not intended to replace advice given to you by your health care provider. Make sure you discuss any questions you have with your health care provider.  Document Released: 09/26/2009 Document Revised: 05/25/2017 Document Reviewed: 06/06/2016  © 2017 [employer]      Depression / Suicide Risk    As you are discharged from this [facility] facility, it is important to learn how to keep safe from harming yourself.    Recognize the warning signs:  · Abrupt changes in personality, positive or negative- including increase in energy   · Giving away possessions  · Change in eating patterns- significant weight changes-  positive or negative  · Change in sleeping patterns- unable to sleep or sleeping all the time   · Unwillingness or inability to communicate  · Depression  · Unusual sadness, discouragement and loneliness  · Talk of wanting to die  · Neglect of personal appearance   · Rebelliousness- reckless behavior  · Withdrawal from people/activities they love  · Confusion- inability to concentrate     If you or a loved one observes any of these behaviors or has concerns about self-harm, here's what you can do:  · Talk about it- your feelings and reasons for harming yourself  · Remove any means that you might use to hurt yourself (examples: pills, rope, extension cords, firearm)  · Get professional help from the community (Mental Health, Substance Abuse, psychological counseling)  · Do not be alone:Call your Safe Contact- someone whom you trust who will be there for you.  · Call your local CRISIS HOTLINE 806-5466 or 878-965-4035  · Call your local Children's Mobile Crisis Response Team [place] (200) 779-6696 or www.Netrounds  · Call the toll free National Suicide Prevention Hotlines   · National Suicide Prevention Lifeline 146-479-YUXF (9958)  · National Hope Line Network 800-SUICIDE (879-6232)

## 2018-03-20 NOTE — PROGRESS NOTES
[facility] Hospitalist Progress Note    Date of Service: 3/19/2018    Chief Complaint  53 y.o. male hx of  diabetic right great toe  infxn post irrigation and debridement to the area proximal to his right toe amputation site, receiving home health wound care , DM, Hypertension, afib on Xarelto  admitted 3/15/2018 right foot pain, swelling, redness.     Interval Problem Update    Reports right foot swelling, pain , redness has improved.  Wound cx + MRSA.   Discussed with orthopedics, plan non operative treatment.     Consultants/Specialty  Orthopedics.     Disposition  tbd        Review of Systems   Constitutional: Negative for chills, diaphoresis and fever.   Respiratory: Negative for cough, shortness of breath and wheezing.    Cardiovascular: Positive for leg swelling (chronic ). Negative for chest pain and palpitations.   Gastrointestinal: Positive for nausea and vomiting. Negative for abdominal pain and diarrhea.        Improved.    Genitourinary: Negative for flank pain and hematuria.   Musculoskeletal: Positive for joint pain. Negative for back pain and neck pain.   Neurological: Positive for weakness. Negative for dizziness, sensory change, speech change, focal weakness and headaches.   Psychiatric/Behavioral: Negative for hallucinations and substance abuse.      Physical Exam  Laboratory/Imaging   Hemodynamics  Temp (24hrs), Av.2 °C (97.2 °F), Min:36.1 °C (97 °F), Max:36.3 °C (97.4 °F)   Temperature: 36.2 °C (97.2 °F)  Pulse  Av.5  Min: 60  Max: 108    Blood Pressure: 147/83      Respiratory      Respiration: 14, Pulse Oximetry: 97 %        RUL Breath Sounds: Clear, RML Breath Sounds: Diminished, RLL Breath Sounds: Diminished, ELIAS Breath Sounds: Clear, LLL Breath Sounds: Diminished    Fluids  No intake or output data in the 24 hours ending 18 1701    Nutrition  Orders Placed This Encounter   Procedures   • Diet Order     Standing Status:   Standing     Number of Occurrences:   1     Order Specific  Question:   Diet:     Answer:   Diabetic [3]     Physical Exam   Constitutional: He is oriented to person, place, and time. No distress.   HENT:   Head: Normocephalic and atraumatic.   Right Ear: External ear normal.   Left Ear: External ear normal.   Nose: Nose normal.   Eyes: Conjunctivae and EOM are normal. No scleral icterus.   Neck: Normal range of motion. No JVD present.   Cardiovascular: Normal rate and regular rhythm.    No murmur heard.  Pulmonary/Chest: Breath sounds normal. No stridor. He has no wheezes. He has no rales.   Abdominal: Soft. Bowel sounds are normal. He exhibits no distension. There is no tenderness.   Obese.    Musculoskeletal: He exhibits edema and tenderness.   Right medial foot dressing exposed with findings of  dry ulcer. Minimal surrounding redness. Great toe amputated   Bilateral lower ext lymphedema 3+    Neurological: He is alert and oriented to person, place, and time. No cranial nerve deficit.   Skin: Skin is warm and dry. He is not diaphoretic. No pallor.   Psychiatric: He has a normal mood and affect. His behavior is normal.   Vitals reviewed.      Recent Labs      03/17/18   0237  03/19/18   0310   WBC  11.1*  10.7   RBC  4.15*  4.30*   HEMOGLOBIN  11.8*  12.5*   HEMATOCRIT  36.4*  38.0*   MCV  87.7  88.4   MCH  28.4  29.1   MCHC  32.4*  32.9*   RDW  46.7  46.5   PLATELETCT  251  227   MPV  11.1  11.4     Recent Labs      03/17/18   0237   SODIUM  138   POTASSIUM  3.6   CHLORIDE  104   CO2  26   GLUCOSE  144*   BUN  15   CREATININE  0.76   CALCIUM  8.6                      Assessment/Plan     * Diabetic infection of right foot (CMS-[facility])- (present on admission)   Assessment & Plan    MRSA foot infxn w cellulitis.  Xray - no Osteomyelitis . Discussed with ortho - plan non operative treatment.   de escalate to oral bactrim, doxy - complete 14 today days of atbs.  Wound care- arrange for outpatient treatment.   Prn ultram  Glycemic control             N&V (nausea and vomiting)    Assessment & Plan    Possible GPS, abd exam benign-- clinically better.   Continue tighten glycemic control  Prn zofran , sched reglan.           Type 2 diabetes mellitus (CMS-HCC)- (present on admission)   Assessment & Plan    w hyperglycemia  Monitor serial accuchecks, SSI as needed.   cw  metformin           HTN (hypertension)- (present on admission)   Assessment & Plan    controlled  cw amlodipine, metoprolol  Monitor bp        Atrial fibrillation (CMS-HCC)- (present on admission)   Assessment & Plan    Controlled rate  cw lopressor, digoxin.    Xarelto         HLD (hyperlipidemia)- (present on admission)   Assessment & Plan    Continue statin        Obesity- (present on admission)   Assessment & Plan    Recommend weight loss and dietary modification.         Discussed w Ortho and RN plan of care.    Quality-Core Measures   Reviewed items::  Medications reviewed, Labs reviewed and Radiology images reviewed  Zabala catheter::  No Zabala  DVT: xarelto   Antibiotics:  Treating active infection/contamination beyond 24 hours perioperative coverage

## 2018-03-21 ENCOUNTER — PATIENT OUTREACH (OUTPATIENT)
Dept: HEALTH INFORMATION MANAGEMENT | Facility: OTHER | Age: 53
End: 2018-03-21

## 2018-03-21 LAB
FUNGUS SPEC CULT: ABNORMAL
FUNGUS SPEC CULT: ABNORMAL
SIGNIFICANT IND 70042: ABNORMAL
SITE SITE: ABNORMAL
SOURCE SOURCE: ABNORMAL

## 2018-03-21 NOTE — PROGRESS NOTES
D/C'd home.  Discharge instructions provided to pt.  Pt states understanding.  Pt states all questions have been answered.  Copy of discharge provided to pt.  Signed copy in chart.  Pt states that all personal belongings are in possession.  Pt escorted off unit with assistance from this RN via wheelchair at 1515 without incident.

## 2018-03-23 ENCOUNTER — HOME CARE VISIT (OUTPATIENT)
Dept: HOME HEALTH SERVICES | Facility: HOME HEALTHCARE | Age: 53
End: 2018-03-23
Payer: MEDICAID

## 2018-03-23 VITALS
SYSTOLIC BLOOD PRESSURE: 130 MMHG | HEART RATE: 67 BPM | RESPIRATION RATE: 16 BRPM | TEMPERATURE: 97.5 F | HEIGHT: 72 IN | DIASTOLIC BLOOD PRESSURE: 80 MMHG | BODY MASS INDEX: 38.6 KG/M2 | WEIGHT: 285 LBS | OXYGEN SATURATION: 98 %

## 2018-03-23 PROCEDURE — G0493 RN CARE EA 15 MIN HH/HOSPICE: HCPCS

## 2018-03-23 SDOH — ECONOMIC STABILITY: HOUSING INSECURITY: UNSAFE COOKING RANGE AREA: 0

## 2018-03-23 SDOH — ECONOMIC STABILITY: HOUSING INSECURITY: UNSAFE APPLIANCES: 0

## 2018-03-23 ASSESSMENT — PATIENT HEALTH QUESTIONNAIRE - PHQ9
1. LITTLE INTEREST OR PLEASURE IN DOING THINGS: 00
2. FEELING DOWN, DEPRESSED, IRRITABLE, OR HOPELESS: 00

## 2018-03-25 ASSESSMENT — ACTIVITIES OF DAILY LIVING (ADL)
HOME_HEALTH_OASIS: 04
HOME_HEALTH_OASIS: 01

## 2018-03-25 ASSESSMENT — ENCOUNTER SYMPTOMS
VOMITING: DENIES
NAUSEA: DENIES

## 2018-03-26 ENCOUNTER — HOME CARE VISIT (OUTPATIENT)
Dept: HOME HEALTH SERVICES | Facility: HOME HEALTHCARE | Age: 53
End: 2018-03-26
Payer: MEDICAID

## 2018-03-26 ENCOUNTER — APPOINTMENT (OUTPATIENT)
Dept: WOUND CARE | Facility: MEDICAL CENTER | Age: 53
End: 2018-03-26
Payer: MEDICAID

## 2018-03-26 PROCEDURE — G0299 HHS/HOSPICE OF RN EA 15 MIN: HCPCS

## 2018-03-26 PROCEDURE — G0151 HHCP-SERV OF PT,EA 15 MIN: HCPCS

## 2018-03-27 ENCOUNTER — HOME CARE VISIT (OUTPATIENT)
Dept: HOME HEALTH SERVICES | Facility: HOME HEALTHCARE | Age: 53
End: 2018-03-27
Payer: MEDICAID

## 2018-03-27 ENCOUNTER — TELEPHONE (OUTPATIENT)
Dept: VASCULAR LAB | Facility: MEDICAL CENTER | Age: 53
End: 2018-03-27

## 2018-03-27 VITALS
HEART RATE: 85 BPM | TEMPERATURE: 97.4 F | DIASTOLIC BLOOD PRESSURE: 80 MMHG | SYSTOLIC BLOOD PRESSURE: 128 MMHG | RESPIRATION RATE: 18 BRPM | OXYGEN SATURATION: 97 %

## 2018-03-27 SDOH — ECONOMIC STABILITY: HOUSING INSECURITY: UNSAFE COOKING RANGE AREA: 0

## 2018-03-27 SDOH — ECONOMIC STABILITY: HOUSING INSECURITY: HOME SAFETY: PT LIVES WITH ROOMMATES AND THEY HELP HIM WITH BIGGER TASKS, IADLS COOKING AND SHOPPING.

## 2018-03-27 SDOH — ECONOMIC STABILITY: HOUSING INSECURITY: UNSAFE APPLIANCES: 0

## 2018-03-27 ASSESSMENT — ACTIVITIES OF DAILY LIVING (ADL)
ORAL_CARE_ASSISTANCE: 0
HOUSEKEEPING_ASSISTANCE: 6
GROOMING_ASSISTANCE: 0
EATING_ASSISTANCE: 0
TRANSPORTATION_ASSISTANCE: 6
MEAL_PREP_ASSISTANCE: 2
TOILETING_ASSISTANCE: 0
DRESSING_UB_ASSISTANCE: 0
OASIS_M1830: 05
DRESSING_LB_ASSISTANCE: 0
TELEPHONE_ASSISTANCE: 0
LAUNDRY_ASSISTANCE: 0
MEAL_PREP_ASSISTANCE: 3
SHOPPING_ASSISTANCE: 3

## 2018-03-28 ENCOUNTER — HOME CARE VISIT (OUTPATIENT)
Dept: HOME HEALTH SERVICES | Facility: HOME HEALTHCARE | Age: 53
End: 2018-03-28
Payer: MEDICAID

## 2018-03-28 VITALS
DIASTOLIC BLOOD PRESSURE: 80 MMHG | TEMPERATURE: 97.3 F | OXYGEN SATURATION: 99 % | RESPIRATION RATE: 16 BRPM | SYSTOLIC BLOOD PRESSURE: 130 MMHG | HEART RATE: 83 BPM

## 2018-03-28 PROCEDURE — A6212 FOAM DRG <=16 SQ IN W/BORDER: HCPCS

## 2018-03-28 PROCEDURE — G0299 HHS/HOSPICE OF RN EA 15 MIN: HCPCS

## 2018-03-28 ASSESSMENT — ENCOUNTER SYMPTOMS
VOMITING: PT DENIES
NAUSEA: PT DENIES

## 2018-03-28 ASSESSMENT — BALANCE ASSESSMENTS
BALANCE SCORE: 10
TURNING 360 DEGREES STEADINESS: 1
ATTEMPTS TO ARISE: 1
TURNING 360 DEGREES STEPS: 1
SURVEY COMPLETE: TRUE
SITTING DOWN: 1
EYES CLOSED AT MAXIMUM POSITION NUDGED: 0
ARISES: 1
SITTING BALANCE: 1
NUDGED: 2
STANDING BALANCE: 1
IMMEDIATE STANDING BALANCE FIRST 5 SECONDS: 1

## 2018-03-28 ASSESSMENT — GAIT ASSESSMENTS
LEFT STEP PASS: 1
BALANCE AND GAIT SCORE: 18
SURVEY COMPLETE: TRUE
TRUNK: 0
LEFT STEP CLEAR: 1
STEP SYMMETRY: 1
PATH: 1
STEP CONTINUITY: 1
RIGHT STEP CLEAR: 1
RIGHT STEP PASS: 1
TIME: 0
INITIATION OF GAIT IMMEDIATELY AFTER GO: 1
GAIT SCORE: 8

## 2018-03-29 VITALS
OXYGEN SATURATION: 98 % | SYSTOLIC BLOOD PRESSURE: 118 MMHG | RESPIRATION RATE: 18 BRPM | TEMPERATURE: 98.6 F | DIASTOLIC BLOOD PRESSURE: 70 MMHG | HEART RATE: 70 BPM

## 2018-03-29 ASSESSMENT — ENCOUNTER SYMPTOMS
VOMITING: DENIES
NAUSEA: DENIES

## 2018-03-30 ENCOUNTER — HOME CARE VISIT (OUTPATIENT)
Dept: HOME HEALTH SERVICES | Facility: HOME HEALTHCARE | Age: 53
End: 2018-03-30
Payer: MEDICAID

## 2018-03-30 PROCEDURE — G0299 HHS/HOSPICE OF RN EA 15 MIN: HCPCS

## 2018-04-01 VITALS
DIASTOLIC BLOOD PRESSURE: 74 MMHG | RESPIRATION RATE: 16 BRPM | HEART RATE: 72 BPM | OXYGEN SATURATION: 98 % | SYSTOLIC BLOOD PRESSURE: 130 MMHG | TEMPERATURE: 98 F

## 2018-04-01 LAB — EKG IMPRESSION: NORMAL

## 2018-04-01 ASSESSMENT — ENCOUNTER SYMPTOMS
NAUSEA: PT DENIES
VOMITING: PT DENIES

## 2018-04-02 ENCOUNTER — HOME CARE VISIT (OUTPATIENT)
Dept: HOME HEALTH SERVICES | Facility: HOME HEALTHCARE | Age: 53
End: 2018-04-02
Payer: MEDICAID

## 2018-04-02 VITALS
HEART RATE: 75 BPM | SYSTOLIC BLOOD PRESSURE: 138 MMHG | DIASTOLIC BLOOD PRESSURE: 84 MMHG | TEMPERATURE: 97.1 F | OXYGEN SATURATION: 99 % | RESPIRATION RATE: 16 BRPM

## 2018-04-02 PROCEDURE — G0300 HHS/HOSPICE OF LPN EA 15 MIN: HCPCS

## 2018-04-04 ENCOUNTER — HOME CARE VISIT (OUTPATIENT)
Dept: HOME HEALTH SERVICES | Facility: HOME HEALTHCARE | Age: 53
End: 2018-04-04
Payer: MEDICAID

## 2018-04-04 PROCEDURE — G0300 HHS/HOSPICE OF LPN EA 15 MIN: HCPCS

## 2018-04-05 VITALS
TEMPERATURE: 96.6 F | HEART RATE: 87 BPM | DIASTOLIC BLOOD PRESSURE: 90 MMHG | SYSTOLIC BLOOD PRESSURE: 158 MMHG | OXYGEN SATURATION: 100 % | RESPIRATION RATE: 16 BRPM

## 2018-04-05 SDOH — ECONOMIC STABILITY: HOUSING INSECURITY: UNSAFE APPLIANCES: 0

## 2018-04-05 SDOH — ECONOMIC STABILITY: HOUSING INSECURITY: UNSAFE COOKING RANGE AREA: 0

## 2018-04-05 ASSESSMENT — ENCOUNTER SYMPTOMS: SHORTNESS OF BREATH: T

## 2018-04-06 ENCOUNTER — HOME CARE VISIT (OUTPATIENT)
Dept: HOME HEALTH SERVICES | Facility: HOME HEALTHCARE | Age: 53
End: 2018-04-06
Payer: MEDICAID

## 2018-04-06 VITALS
TEMPERATURE: 97.4 F | RESPIRATION RATE: 16 BRPM | HEART RATE: 75 BPM | OXYGEN SATURATION: 98 % | SYSTOLIC BLOOD PRESSURE: 140 MMHG | DIASTOLIC BLOOD PRESSURE: 80 MMHG

## 2018-04-06 PROCEDURE — G0300 HHS/HOSPICE OF LPN EA 15 MIN: HCPCS

## 2018-04-06 SDOH — ECONOMIC STABILITY: HOUSING INSECURITY: UNSAFE APPLIANCES: 0

## 2018-04-06 SDOH — ECONOMIC STABILITY: HOUSING INSECURITY: UNSAFE COOKING RANGE AREA: 0

## 2018-04-09 ENCOUNTER — HOME CARE VISIT (OUTPATIENT)
Dept: HOME HEALTH SERVICES | Facility: HOME HEALTHCARE | Age: 53
End: 2018-04-09
Payer: MEDICAID

## 2018-04-09 VITALS
OXYGEN SATURATION: 99 % | DIASTOLIC BLOOD PRESSURE: 80 MMHG | SYSTOLIC BLOOD PRESSURE: 144 MMHG | TEMPERATURE: 97.2 F | HEART RATE: 80 BPM | RESPIRATION RATE: 16 BRPM

## 2018-04-09 PROCEDURE — G0300 HHS/HOSPICE OF LPN EA 15 MIN: HCPCS

## 2018-04-09 SDOH — ECONOMIC STABILITY: HOUSING INSECURITY: UNSAFE APPLIANCES: 0

## 2018-04-09 SDOH — ECONOMIC STABILITY: HOUSING INSECURITY: UNSAFE COOKING RANGE AREA: 0

## 2018-04-11 ENCOUNTER — HOME CARE VISIT (OUTPATIENT)
Dept: HOME HEALTH SERVICES | Facility: HOME HEALTHCARE | Age: 53
End: 2018-04-11
Payer: MEDICAID

## 2018-04-11 PROCEDURE — G0300 HHS/HOSPICE OF LPN EA 15 MIN: HCPCS

## 2018-04-12 VITALS
TEMPERATURE: 97.3 F | HEART RATE: 88 BPM | RESPIRATION RATE: 16 BRPM | OXYGEN SATURATION: 98 % | DIASTOLIC BLOOD PRESSURE: 90 MMHG | SYSTOLIC BLOOD PRESSURE: 140 MMHG

## 2018-04-12 SDOH — ECONOMIC STABILITY: HOUSING INSECURITY: UNSAFE COOKING RANGE AREA: 0

## 2018-04-12 SDOH — ECONOMIC STABILITY: HOUSING INSECURITY: UNSAFE APPLIANCES: 0

## 2018-04-12 ASSESSMENT — ENCOUNTER SYMPTOMS: SHORTNESS OF BREATH: T

## 2018-04-13 ENCOUNTER — HOME CARE VISIT (OUTPATIENT)
Dept: HOME HEALTH SERVICES | Facility: HOME HEALTHCARE | Age: 53
End: 2018-04-13
Payer: MEDICAID

## 2018-04-13 PROCEDURE — G0300 HHS/HOSPICE OF LPN EA 15 MIN: HCPCS

## 2018-04-16 ENCOUNTER — HOME CARE VISIT (OUTPATIENT)
Dept: HOME HEALTH SERVICES | Facility: HOME HEALTHCARE | Age: 53
End: 2018-04-16
Payer: MEDICAID

## 2018-04-16 VITALS
RESPIRATION RATE: 16 BRPM | HEART RATE: 63 BPM | SYSTOLIC BLOOD PRESSURE: 150 MMHG | TEMPERATURE: 96.9 F | OXYGEN SATURATION: 99 % | DIASTOLIC BLOOD PRESSURE: 86 MMHG

## 2018-04-16 PROCEDURE — G0300 HHS/HOSPICE OF LPN EA 15 MIN: HCPCS

## 2018-04-17 VITALS
SYSTOLIC BLOOD PRESSURE: 146 MMHG | DIASTOLIC BLOOD PRESSURE: 98 MMHG | OXYGEN SATURATION: 99 % | RESPIRATION RATE: 18 BRPM | TEMPERATURE: 97.5 F | HEART RATE: 67 BPM

## 2018-04-17 SDOH — ECONOMIC STABILITY: HOUSING INSECURITY: UNSAFE COOKING RANGE AREA: 0

## 2018-04-17 SDOH — ECONOMIC STABILITY: HOUSING INSECURITY: UNSAFE APPLIANCES: 0

## 2018-04-20 ENCOUNTER — HOME CARE VISIT (OUTPATIENT)
Dept: HOME HEALTH SERVICES | Facility: HOME HEALTHCARE | Age: 53
End: 2018-04-20
Payer: MEDICAID

## 2018-04-20 PROCEDURE — G0299 HHS/HOSPICE OF RN EA 15 MIN: HCPCS

## 2018-04-23 ENCOUNTER — HOME CARE VISIT (OUTPATIENT)
Dept: HOME HEALTH SERVICES | Facility: HOME HEALTHCARE | Age: 53
End: 2018-04-23
Payer: MEDICAID

## 2018-04-23 VITALS
OXYGEN SATURATION: 99 % | DIASTOLIC BLOOD PRESSURE: 80 MMHG | SYSTOLIC BLOOD PRESSURE: 136 MMHG | RESPIRATION RATE: 16 BRPM | HEART RATE: 76 BPM | TEMPERATURE: 96.5 F

## 2018-04-23 PROCEDURE — A6212 FOAM DRG <=16 SQ IN W/BORDER: HCPCS

## 2018-04-23 PROCEDURE — G0300 HHS/HOSPICE OF LPN EA 15 MIN: HCPCS

## 2018-04-23 SDOH — ECONOMIC STABILITY: HOUSING INSECURITY: UNSAFE COOKING RANGE AREA: 0

## 2018-04-23 SDOH — ECONOMIC STABILITY: HOUSING INSECURITY: UNSAFE APPLIANCES: 0

## 2018-04-24 ENCOUNTER — OFFICE VISIT (OUTPATIENT)
Dept: MEDICAL GROUP | Facility: MEDICAL CENTER | Age: 53
End: 2018-04-24
Attending: FAMILY MEDICINE
Payer: MEDICAID

## 2018-04-24 VITALS
OXYGEN SATURATION: 95 % | RESPIRATION RATE: 14 BRPM | TEMPERATURE: 97.6 F | HEIGHT: 72 IN | SYSTOLIC BLOOD PRESSURE: 130 MMHG | WEIGHT: 281 LBS | BODY MASS INDEX: 38.06 KG/M2 | DIASTOLIC BLOOD PRESSURE: 100 MMHG | HEART RATE: 88 BPM

## 2018-04-24 DIAGNOSIS — I10 ESSENTIAL HYPERTENSION: ICD-10-CM

## 2018-04-24 DIAGNOSIS — S98.119A AMPUTATED GREAT TOE, UNSPECIFIED LATERALITY (HCC): ICD-10-CM

## 2018-04-24 DIAGNOSIS — E11.69 HYPERLIPIDEMIA ASSOCIATED WITH TYPE 2 DIABETES MELLITUS (HCC): ICD-10-CM

## 2018-04-24 DIAGNOSIS — E78.5 HYPERLIPIDEMIA ASSOCIATED WITH TYPE 2 DIABETES MELLITUS (HCC): ICD-10-CM

## 2018-04-24 DIAGNOSIS — E11.8 DM (DIABETES MELLITUS) WITH COMPLICATIONS (HCC): ICD-10-CM

## 2018-04-24 DIAGNOSIS — I48.0 PAROXYSMAL ATRIAL FIBRILLATION (HCC): ICD-10-CM

## 2018-04-24 PROBLEM — R62.7 FAILURE TO THRIVE IN ADULT: Status: RESOLVED | Noted: 2017-11-23 | Resolved: 2018-04-24

## 2018-04-24 PROBLEM — R11.2 N&V (NAUSEA AND VOMITING): Status: RESOLVED | Noted: 2018-03-16 | Resolved: 2018-04-24

## 2018-04-24 PROBLEM — R07.9 CHEST PAIN: Status: RESOLVED | Noted: 2018-01-28 | Resolved: 2018-04-24

## 2018-04-24 PROBLEM — E83.42 HYPOMAGNESEMIA: Status: RESOLVED | Noted: 2017-11-15 | Resolved: 2018-04-24

## 2018-04-24 PROCEDURE — 99214 OFFICE O/P EST MOD 30 MIN: CPT | Performed by: FAMILY MEDICINE

## 2018-04-24 PROCEDURE — 99212 OFFICE O/P EST SF 10 MIN: CPT | Performed by: FAMILY MEDICINE

## 2018-04-24 RX ORDER — LISINOPRIL 40 MG/1
40 TABLET ORAL DAILY
Qty: 30 TAB | Refills: 3 | Status: ON HOLD | OUTPATIENT
Start: 2018-04-24 | End: 2018-07-23

## 2018-04-24 RX ORDER — DIGOXIN 250 MCG
250 TABLET ORAL DAILY
Qty: 30 TAB | Refills: 3 | Status: SHIPPED | OUTPATIENT
Start: 2018-04-24 | End: 2018-07-09

## 2018-04-24 RX ORDER — ACETAMINOPHEN 325 MG/1
650 TABLET ORAL EVERY 6 HOURS PRN
Qty: 30 TAB | Refills: 0 | Status: SHIPPED | OUTPATIENT
Start: 2018-04-24 | End: 2018-09-26

## 2018-04-24 RX ORDER — PRAVASTATIN SODIUM 20 MG
20 TABLET ORAL EVERY EVENING
Qty: 30 TAB | Refills: 11 | Status: ON HOLD | OUTPATIENT
Start: 2018-04-24 | End: 2018-09-14

## 2018-04-24 RX ORDER — ERGOCALCIFEROL 1.25 MG/1
50000 CAPSULE ORAL
Qty: 8 CAP | Refills: 0 | Status: SHIPPED | OUTPATIENT
Start: 2018-04-24 | End: 2018-06-13

## 2018-04-24 RX ORDER — ASPIRIN 81 MG/1
81 TABLET ORAL DAILY
Qty: 30 TAB | Refills: 3 | Status: ON HOLD | OUTPATIENT
Start: 2018-04-24 | End: 2018-09-14

## 2018-04-24 RX ORDER — FAMOTIDINE 20 MG/1
20 TABLET, FILM COATED ORAL 2 TIMES DAILY
Qty: 60 TAB | Refills: 3 | Status: SHIPPED | OUTPATIENT
Start: 2018-04-24 | End: 2018-09-09

## 2018-04-24 RX ORDER — HYDROCHLOROTHIAZIDE 25 MG/1
25 TABLET ORAL DAILY
Qty: 30 TAB | Refills: 1 | Status: ON HOLD | OUTPATIENT
Start: 2018-04-24 | End: 2018-07-23

## 2018-04-24 RX ORDER — METOPROLOL TARTRATE 100 MG/1
100 TABLET ORAL 2 TIMES DAILY
Qty: 135 TAB | Refills: 3 | Status: ON HOLD | OUTPATIENT
Start: 2018-04-24 | End: 2018-07-23

## 2018-04-24 ASSESSMENT — ENCOUNTER SYMPTOMS
SHORTNESS OF BREATH: 0
PALPITATIONS: 0
NECK PAIN: 0
SPUTUM PRODUCTION: 0
SPEECH CHANGE: 0
CHILLS: 0
TINGLING: 1
FEVER: 0
TREMORS: 0
ABDOMINAL PAIN: 0
SENSORY CHANGE: 0
BACK PAIN: 1
COUGH: 0
VOMITING: 0
HEADACHES: 0
NAUSEA: 0
PSYCHIATRIC NEGATIVE: 1
DIZZINESS: 0

## 2018-04-24 NOTE — PROGRESS NOTES
Subjective:      Sebastián Castro is a 53 y.o. male who presents with Medication Refill            Patient here today for follow-up of his hypertension, recent toe amputation, diabetes, atrial fibrillation, and hyperlipidemia.    Today he will need medications refilled, he is not having any issues with his medications and has not had any bleeding or bruising secondary to his blood thinner. He has been following up with his cardiologist, the patient states that his cardiologist wanted him to get his refills of his heart medications from this clinic. So refills will be sent to his pharmacy today. He is not having any chest pain, shortness of breath and has no syncopal spells. We'll continue to follow.    Will order blood work to recheck his hemoglobin A1c, microalbumin creatinine ratio as well as his lipid panel. He will continue to check his feet for any further skin breakdown or further changes in sensation. He is also reminded to schedule an appointment for a diabetic retinal exam. We'll continue to follow.    He has not had any darkening of his urine or unusual muscle aches or pains secondary to his cholesterol medication. He'll continue to watch his diet avoiding fatty and fried foods as well as increasing his fiber intake. He'll also exercise as tolerated. We'll continue to follow.    He states that his amputation has been improving and he is able to walk without assistance. He'll continue to monitor his surgical wound as well as his feet for any further skin breakdown or changes in sensation. We'll continue to follow.     Current medications, allergies, and problem list reviewed with patient and updated in EPIC.          Review of Systems   Constitutional: Negative for chills and fever.   HENT: Negative for hearing loss and tinnitus.    Respiratory: Negative for cough, sputum production and shortness of breath.    Cardiovascular: Negative for chest pain and palpitations.   Gastrointestinal: Negative for abdominal  pain, nausea and vomiting.   Musculoskeletal: Positive for back pain. Negative for joint pain and neck pain.   Skin: Negative for rash.   Neurological: Positive for tingling. Negative for dizziness, tremors, sensory change, speech change and headaches.   Psychiatric/Behavioral: Negative.           Objective:     /100   Pulse 88   Temp 36.4 °C (97.6 °F)   Resp 14   Ht 1.829 m (6')   Wt (!) 127.5 kg (281 lb)   SpO2 95%   BMI 38.11 kg/m²      Physical Exam   Constitutional: He is oriented to person, place, and time.   BMI 38.11   HENT:   Head: Normocephalic and atraumatic.   Cardiovascular: Normal rate.  Exam reveals no friction rub.    No murmur heard.  irreg irreg rhythm   Pulmonary/Chest: Effort normal and breath sounds normal. No respiratory distress. He has no wheezes. He has no rales.   Abdominal: Soft. Bowel sounds are normal. He exhibits no distension. There is no tenderness.   Musculoskeletal:   Amputation healing well   Neurological: He is alert and oriented to person, place, and time.   Skin: Skin is warm and dry.   Psychiatric: He has a normal mood and affect. His behavior is normal.   Nursing note and vitals reviewed.              Assessment/Plan:     1. Paroxysmal atrial fibrillation (CMS-HCC)  Will have pt continue to use his blood thinner as directed and follow up with anticoagulation clinic. Will continue to follow. ER precautions given to pt.  - metoprolol (LOPRESSOR) 100 MG Tab; Take 1 Tab by mouth 2 times a day.  Dispense: 135 Tab; Refill: 3  - REFERRAL TO ANTICOAGULATION MONITORING    2. Amputated great toe, unspecified laterality (CMS-HCC)  Will continue to follow.     3. Essential hypertension  He will continue to take his medication as directed. He has been advised to monitor blood pressure at home and keep notes. If blood pressure elevated or having symptoms of CP, SOB or neurologic changes to go to the er.    - COMP METABOLIC PANEL; Future  - LIPID PROFILE; Future  - TSH WITH  REFLEX TO FT4; Future  - CBC WITH DIFFERENTIAL; Future  - VITAMIN D,25 HYDROXY; Future  - HEMOGLOBIN A1C; Future  - MICROALBUMIN CREAT RATIO URINE; Future    4. DM (diabetes mellitus) with complications (CMS-HCC)  Will have him continue to take his medication as directed. Will check an A1c and microalb creat ratio. Will also have patient check his feet daily for any further skin breakdown or changes in sensation as well as follow-up with an ophthalmologist for diabetic retinal exam.  - COMP METABOLIC PANEL; Future  - LIPID PROFILE; Future  - TSH WITH REFLEX TO FT4; Future  - CBC WITH DIFFERENTIAL; Future  - VITAMIN D,25 HYDROXY; Future  - HEMOGLOBIN A1C; Future  - MICROALBUMIN CREAT RATIO URINE; Future    5. Hyperlipidemia associated with type 2 diabetes mellitus (CMS-HCC)  Will have pt continue to take his medication as directed. He has been advised to increase the fibers in his diet, avoid fatty/fried foods. Also advised to exercise as tolerated.

## 2018-04-26 VITALS
SYSTOLIC BLOOD PRESSURE: 126 MMHG | TEMPERATURE: 98.6 F | DIASTOLIC BLOOD PRESSURE: 78 MMHG | HEART RATE: 80 BPM | OXYGEN SATURATION: 98 % | RESPIRATION RATE: 18 BRPM

## 2018-04-26 ASSESSMENT — ENCOUNTER SYMPTOMS
NAUSEA: DENIES
VOMITING: DENIES

## 2018-04-27 ENCOUNTER — HOME CARE VISIT (OUTPATIENT)
Dept: HOME HEALTH SERVICES | Facility: HOME HEALTHCARE | Age: 53
End: 2018-04-27
Payer: MEDICAID

## 2018-04-27 VITALS
OXYGEN SATURATION: 94 % | HEART RATE: 80 BPM | DIASTOLIC BLOOD PRESSURE: 80 MMHG | SYSTOLIC BLOOD PRESSURE: 110 MMHG | TEMPERATURE: 97.4 F | BODY MASS INDEX: 36.89 KG/M2 | RESPIRATION RATE: 16 BRPM | WEIGHT: 272 LBS

## 2018-04-27 PROCEDURE — G0299 HHS/HOSPICE OF RN EA 15 MIN: HCPCS

## 2018-04-27 SDOH — ECONOMIC STABILITY: HOUSING INSECURITY: UNSAFE COOKING RANGE AREA: 0

## 2018-04-27 SDOH — ECONOMIC STABILITY: HOUSING INSECURITY: UNSAFE APPLIANCES: 0

## 2018-04-27 ASSESSMENT — ACTIVITIES OF DAILY LIVING (ADL)
DRESSING_LB_ASSISTANCE: 0
GROOMING_ASSISTANCE: 0
TOILETING_ASSISTANCE: 0
BATHING_ASSISTANCE: 1
ORAL_CARE_ASSISTANCE: 0
EATING_ASSISTANCE: 0
DRESSING_UB_ASSISTANCE: 0

## 2018-04-27 ASSESSMENT — ENCOUNTER SYMPTOMS
VOMITING: DENIES
NAUSEA: DENIES
ADDITIONAL INFORMATION: DENIES PAIN
RESPIRATORY SYMPTOMS COMMENTS: NO RESPIRATORY DISTRESS NOTED

## 2018-05-02 ENCOUNTER — HOME CARE VISIT (OUTPATIENT)
Dept: HOME HEALTH SERVICES | Facility: HOME HEALTHCARE | Age: 53
End: 2018-05-02
Payer: MEDICAID

## 2018-05-02 VITALS
RESPIRATION RATE: 18 BRPM | OXYGEN SATURATION: 98 % | HEART RATE: 80 BPM | SYSTOLIC BLOOD PRESSURE: 120 MMHG | DIASTOLIC BLOOD PRESSURE: 80 MMHG | TEMPERATURE: 98.6 F

## 2018-05-02 PROCEDURE — G0162 HHC RN E&M PLAN SVS, 15 MIN: HCPCS

## 2018-05-02 SDOH — ECONOMIC STABILITY: HOUSING INSECURITY: UNSAFE COOKING RANGE AREA: 0

## 2018-05-02 SDOH — ECONOMIC STABILITY: HOUSING INSECURITY: UNSAFE APPLIANCES: 0

## 2018-05-02 ASSESSMENT — ACTIVITIES OF DAILY LIVING (ADL)
OASIS_M1830: 00
HOME_HEALTH_OASIS: 00

## 2018-05-09 ENCOUNTER — TELEPHONE (OUTPATIENT)
Dept: VASCULAR LAB | Facility: MEDICAL CENTER | Age: 53
End: 2018-05-09

## 2018-05-09 NOTE — TELEPHONE ENCOUNTER
[facility]     for pt to call clinic and establish care.     Simone Dixon, PharmD

## 2018-05-16 ENCOUNTER — TELEPHONE (OUTPATIENT)
Dept: VASCULAR LAB | Facility: MEDICAL CENTER | Age: 53
End: 2018-05-16

## 2018-05-16 NOTE — TELEPHONE ENCOUNTER
[facility]    Unable to reach pt by phone, will try again at a later time to establish care.    Simone Dixon, PharmD

## 2018-05-23 ENCOUNTER — TELEPHONE (OUTPATIENT)
Dept: VASCULAR LAB | Facility: MEDICAL CENTER | Age: 53
End: 2018-05-23

## 2018-05-23 NOTE — TELEPHONE ENCOUNTER
Called and unable to leave msg.   Unable to establish care, sent letter and FYI to referring provider.    Sharon Engel, PharmD

## 2018-05-23 NOTE — LETTER
May 23, 2018        Sebastián Castro  [address] [place] [address]      Dear Sebastián Castro ,    We have been unsuccessful in our attempts to contact you regarding your Anticoagulation Service referral.  Anticoagulants are medications that can cause potential harmful side effects when not monitored properly. Without proper monitoring there is potential for serious, sometimes life-threatening bleeding problems or life-threatening blood clots or stroke could result.    Please contact our clinic so we may assist you.  We are open Monday-Friday 8 am until 5 pm.  You may reach our Service at [phone].    To monitor your anticoagulant effectively, we need to be able to communicate with you.  This is a requirement to be followed by our Service.  Please contact the clinic as soon as possible to establish care and provide your current contact information.  Thank you.        Sincerely,        Artemio Dickerson, PharmD, [facility]  Pharmacy Clinical Supervisor  [facility]  Outpatient Anticoagulation Service

## 2018-07-09 ENCOUNTER — APPOINTMENT (OUTPATIENT)
Dept: RADIOLOGY | Facility: MEDICAL CENTER | Age: 53
DRG: 313 | End: 2018-07-09
Attending: NURSE PRACTITIONER
Payer: MEDICAID

## 2018-07-09 ENCOUNTER — APPOINTMENT (OUTPATIENT)
Dept: RADIOLOGY | Facility: MEDICAL CENTER | Age: 53
DRG: 313 | End: 2018-07-09
Attending: EMERGENCY MEDICINE
Payer: MEDICAID

## 2018-07-09 ENCOUNTER — HOSPITAL ENCOUNTER (INPATIENT)
Facility: MEDICAL CENTER | Age: 53
LOS: 3 days | DRG: 313 | End: 2018-07-16
Attending: EMERGENCY MEDICINE | Admitting: INTERNAL MEDICINE
Payer: MEDICAID

## 2018-07-09 DIAGNOSIS — R07.9 CHEST PAIN, UNSPECIFIED TYPE: ICD-10-CM

## 2018-07-09 DIAGNOSIS — R07.89 ATYPICAL CHEST PAIN: ICD-10-CM

## 2018-07-09 DIAGNOSIS — I48.0 PAROXYSMAL A-FIB (HCC): ICD-10-CM

## 2018-07-09 DIAGNOSIS — I10 ESSENTIAL HYPERTENSION: Chronic | ICD-10-CM
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
## 2018-07-09 PROBLEM — I48.91 ATRIAL FIBRILLATION WITH RVR (HCC): Status: ACTIVE | Noted: 2018-07-09

## 2018-07-09 PROBLEM — D68.318 ACQUIRED CIRCULATING ANTICOAGULANTS (HCC): Status: ACTIVE | Noted: 2018-07-09

## 2018-07-09 LAB
AMPHET UR QL SCN: NEGATIVE
ANION GAP SERPL CALC-SCNC: 6 MMOL/L (ref 0–11.9)
APTT PPP: 30.9 SEC (ref 24.7–36)
BARBITURATES UR QL SCN: NEGATIVE
BASOPHILS # BLD AUTO: 0.6 % (ref 0–1.8)
BASOPHILS # BLD: 0.06 K/UL (ref 0–0.12)
BENZODIAZ UR QL SCN: NEGATIVE
BNP SERPL-MCNC: 147 PG/ML (ref 0–100)
BUN SERPL-MCNC: 24 MG/DL (ref 8–22)
BZE UR QL SCN: NEGATIVE
CALCIUM SERPL-MCNC: 9.6 MG/DL (ref 8.5–10.5)
CANNABINOIDS UR QL SCN: NEGATIVE
CHLORIDE SERPL-SCNC: 102 MMOL/L (ref 96–112)
CO2 SERPL-SCNC: 27 MMOL/L (ref 20–33)
CREAT SERPL-MCNC: 0.86 MG/DL (ref 0.5–1.4)
DEPRECATED D DIMER PPP IA-ACNC: 1079 NG/ML(D-DU)
DEPRECATED D DIMER PPP IA-ACNC: 234 NG/ML(D-DU)
EKG IMPRESSION: NORMAL
EKG IMPRESSION: NORMAL
EOSINOPHIL # BLD AUTO: 0.24 K/UL (ref 0–0.51)
EOSINOPHIL NFR BLD: 2.6 % (ref 0–6.9)
ERYTHROCYTE [DISTWIDTH] IN BLOOD BY AUTOMATED COUNT: 43.7 FL (ref 35.9–50)
EST. AVERAGE GLUCOSE BLD GHB EST-MCNC: 140 MG/DL
GLUCOSE SERPL-MCNC: 169 MG/DL (ref 65–99)
HBA1C MFR BLD: 6.5 % (ref 0–5.6)
HCT VFR BLD AUTO: 45 % (ref 42–52)
HGB BLD-MCNC: 14.5 G/DL (ref 14–18)
IMM GRANULOCYTES # BLD AUTO: 0.02 K/UL (ref 0–0.11)
IMM GRANULOCYTES NFR BLD AUTO: 0.2 % (ref 0–0.9)
INR PPP: 1.11 (ref 0.87–1.13)
LV EJECT FRACT  99904: 40
LV EJECT FRACT MOD 2C 99903: 40.01
LV EJECT FRACT MOD 4C 99902: 39.47
LV EJECT FRACT MOD BP 99901: 39.85
LYMPHOCYTES # BLD AUTO: 2.24 K/UL (ref 1–4.8)
LYMPHOCYTES NFR BLD: 24.1 % (ref 22–41)
MAGNESIUM SERPL-MCNC: 2.1 MG/DL (ref 1.5–2.5)
MCH RBC QN AUTO: 27.5 PG (ref 27–33)
MCHC RBC AUTO-ENTMCNC: 32.2 G/DL (ref 33.7–35.3)
MCV RBC AUTO: 85.4 FL (ref 81.4–97.8)
METHADONE UR QL SCN: NEGATIVE
MONOCYTES # BLD AUTO: 0.58 K/UL (ref 0–0.85)
MONOCYTES NFR BLD AUTO: 6.2 % (ref 0–13.4)
NEUTROPHILS # BLD AUTO: 6.17 K/UL (ref 1.82–7.42)
NEUTROPHILS NFR BLD: 66.3 % (ref 44–72)
NRBC # BLD AUTO: 0 K/UL
NRBC BLD-RTO: 0 /100 WBC
OPIATES UR QL SCN: NEGATIVE
OXYCODONE UR QL SCN: NEGATIVE
PCP UR QL SCN: NEGATIVE
PLATELET # BLD AUTO: 194 K/UL (ref 164–446)
PMV BLD AUTO: 11.4 FL (ref 9–12.9)
POTASSIUM SERPL-SCNC: 4.2 MMOL/L (ref 3.6–5.5)
PROPOXYPH UR QL SCN: NEGATIVE
PROTHROMBIN TIME: 14 SEC (ref 12–14.6)
RBC # BLD AUTO: 5.27 M/UL (ref 4.7–6.1)
SODIUM SERPL-SCNC: 135 MMOL/L (ref 135–145)
TROPONIN I SERPL-MCNC: <0.01 NG/ML (ref 0–0.04)
TSH SERPL DL<=0.005 MIU/L-ACNC: 2.31 UIU/ML (ref 0.38–5.33)
WBC # BLD AUTO: 9.3 K/UL (ref 4.8–10.8)

## 2018-07-09 PROCEDURE — 71275 CT ANGIOGRAPHY CHEST: CPT

## 2018-07-09 PROCEDURE — 71045 X-RAY EXAM CHEST 1 VIEW: CPT

## 2018-07-09 PROCEDURE — 85610 PROTHROMBIN TIME: CPT

## 2018-07-09 PROCEDURE — 84443 ASSAY THYROID STIM HORMONE: CPT

## 2018-07-09 PROCEDURE — 99220 PR INITIAL OBSERVATION CARE,LEVL III: CPT | Performed by: INTERNAL MEDICINE

## 2018-07-09 PROCEDURE — G0378 HOSPITAL OBSERVATION PER HR: HCPCS

## 2018-07-09 PROCEDURE — A9270 NON-COVERED ITEM OR SERVICE: HCPCS | Performed by: INTERNAL MEDICINE

## 2018-07-09 PROCEDURE — 85379 FIBRIN DEGRADATION QUANT: CPT

## 2018-07-09 PROCEDURE — 700111 HCHG RX REV CODE 636 W/ 250 OVERRIDE (IP): Performed by: INTERNAL MEDICINE

## 2018-07-09 PROCEDURE — 93306 TTE W/DOPPLER COMPLETE: CPT

## 2018-07-09 PROCEDURE — 84484 ASSAY OF TROPONIN QUANT: CPT | Mod: 91

## 2018-07-09 PROCEDURE — 99285 EMERGENCY DEPT VISIT HI MDM: CPT

## 2018-07-09 PROCEDURE — 93005 ELECTROCARDIOGRAM TRACING: CPT | Performed by: EMERGENCY MEDICINE

## 2018-07-09 PROCEDURE — 93005 ELECTROCARDIOGRAM TRACING: CPT | Performed by: INTERNAL MEDICINE

## 2018-07-09 PROCEDURE — 80048 BASIC METABOLIC PNL TOTAL CA: CPT

## 2018-07-09 PROCEDURE — 93005 ELECTROCARDIOGRAM TRACING: CPT

## 2018-07-09 PROCEDURE — 93308 TTE F-UP OR LMTD: CPT | Mod: 26 | Performed by: INTERNAL MEDICINE

## 2018-07-09 PROCEDURE — 83735 ASSAY OF MAGNESIUM: CPT

## 2018-07-09 PROCEDURE — 85025 COMPLETE CBC W/AUTO DIFF WBC: CPT

## 2018-07-09 PROCEDURE — 85730 THROMBOPLASTIN TIME PARTIAL: CPT

## 2018-07-09 PROCEDURE — 36415 COLL VENOUS BLD VENIPUNCTURE: CPT

## 2018-07-09 PROCEDURE — 83036 HEMOGLOBIN GLYCOSYLATED A1C: CPT

## 2018-07-09 PROCEDURE — 93010 ELECTROCARDIOGRAM REPORT: CPT | Performed by: INTERNAL MEDICINE

## 2018-07-09 PROCEDURE — 80307 DRUG TEST PRSMV CHEM ANLYZR: CPT

## 2018-07-09 PROCEDURE — 96374 THER/PROPH/DIAG INJ IV PUSH: CPT

## 2018-07-09 PROCEDURE — 700117 HCHG RX CONTRAST REV CODE 255: Performed by: NURSE PRACTITIONER

## 2018-07-09 PROCEDURE — 83880 ASSAY OF NATRIURETIC PEPTIDE: CPT

## 2018-07-09 PROCEDURE — 700102 HCHG RX REV CODE 250 W/ 637 OVERRIDE(OP): Performed by: INTERNAL MEDICINE

## 2018-07-09 RX ORDER — MORPHINE SULFATE 2 MG/ML
1-2 INJECTION, SOLUTION INTRAMUSCULAR; INTRAVENOUS
Status: DISCONTINUED | OUTPATIENT
Start: 2018-07-09 | End: 2018-07-09

## 2018-07-09 RX ORDER — BISACODYL 10 MG
10 SUPPOSITORY, RECTAL RECTAL
Status: DISCONTINUED | OUTPATIENT
Start: 2018-07-09 | End: 2018-07-17 | Stop reason: HOSPADM

## 2018-07-09 RX ORDER — ALUMINA, MAGNESIA, AND SIMETHICONE 2400; 2400; 240 MG/30ML; MG/30ML; MG/30ML
30 SUSPENSION ORAL EVERY 4 HOURS PRN
Status: DISCONTINUED | OUTPATIENT
Start: 2018-07-09 | End: 2018-07-17 | Stop reason: HOSPADM

## 2018-07-09 RX ORDER — ENALAPRILAT 1.25 MG/ML
1.25 INJECTION INTRAVENOUS EVERY 6 HOURS PRN
Status: DISCONTINUED | OUTPATIENT
Start: 2018-07-09 | End: 2018-07-13

## 2018-07-09 RX ORDER — ASPIRIN 325 MG
325 TABLET ORAL DAILY
Status: DISCONTINUED | OUTPATIENT
Start: 2018-07-09 | End: 2018-07-17 | Stop reason: HOSPADM

## 2018-07-09 RX ORDER — LABETALOL HYDROCHLORIDE 5 MG/ML
10 INJECTION, SOLUTION INTRAVENOUS EVERY 4 HOURS PRN
Status: DISCONTINUED | OUTPATIENT
Start: 2018-07-09 | End: 2018-07-17 | Stop reason: HOSPADM

## 2018-07-09 RX ORDER — ASPIRIN 300 MG/1
300 SUPPOSITORY RECTAL DAILY
Status: DISCONTINUED | OUTPATIENT
Start: 2018-07-09 | End: 2018-07-17 | Stop reason: HOSPADM

## 2018-07-09 RX ORDER — PROMETHAZINE HYDROCHLORIDE 25 MG/1
12.5-25 TABLET ORAL EVERY 4 HOURS PRN
Status: DISCONTINUED | OUTPATIENT
Start: 2018-07-09 | End: 2018-07-17 | Stop reason: HOSPADM

## 2018-07-09 RX ORDER — CLONIDINE HYDROCHLORIDE 0.1 MG/1
0.1 TABLET ORAL EVERY 6 HOURS PRN
Status: DISCONTINUED | OUTPATIENT
Start: 2018-07-09 | End: 2018-07-13

## 2018-07-09 RX ORDER — LISINOPRIL 20 MG/1
40 TABLET ORAL DAILY
Status: DISCONTINUED | OUTPATIENT
Start: 2018-07-10 | End: 2018-07-12

## 2018-07-09 RX ORDER — POLYETHYLENE GLYCOL 3350 17 G/17G
1 POWDER, FOR SOLUTION ORAL
Status: DISCONTINUED | OUTPATIENT
Start: 2018-07-09 | End: 2018-07-17 | Stop reason: HOSPADM

## 2018-07-09 RX ORDER — PROMETHAZINE HYDROCHLORIDE 12.5 MG/1
12.5-25 SUPPOSITORY RECTAL EVERY 4 HOURS PRN
Status: DISCONTINUED | OUTPATIENT
Start: 2018-07-09 | End: 2018-07-17 | Stop reason: HOSPADM

## 2018-07-09 RX ORDER — MORPHINE SULFATE 4 MG/ML
1-2 INJECTION, SOLUTION INTRAMUSCULAR; INTRAVENOUS
Status: DISCONTINUED | OUTPATIENT
Start: 2018-07-09 | End: 2018-07-17 | Stop reason: HOSPADM

## 2018-07-09 RX ORDER — METOPROLOL TARTRATE 50 MG/1
100 TABLET, FILM COATED ORAL 2 TIMES DAILY
Status: DISCONTINUED | OUTPATIENT
Start: 2018-07-09 | End: 2018-07-13

## 2018-07-09 RX ORDER — NITROGLYCERIN 0.4 MG/1
0.4 TABLET SUBLINGUAL
Status: DISCONTINUED | OUTPATIENT
Start: 2018-07-09 | End: 2018-07-13

## 2018-07-09 RX ORDER — PRAVASTATIN SODIUM 20 MG
20 TABLET ORAL EVERY EVENING
Status: DISCONTINUED | OUTPATIENT
Start: 2018-07-09 | End: 2018-07-17 | Stop reason: HOSPADM

## 2018-07-09 RX ORDER — HYDROCHLOROTHIAZIDE 25 MG/1
25 TABLET ORAL DAILY
Status: DISCONTINUED | OUTPATIENT
Start: 2018-07-10 | End: 2018-07-13

## 2018-07-09 RX ORDER — ASPIRIN 81 MG/1
324 TABLET, CHEWABLE ORAL DAILY
Status: DISCONTINUED | OUTPATIENT
Start: 2018-07-09 | End: 2018-07-17 | Stop reason: HOSPADM

## 2018-07-09 RX ORDER — ONDANSETRON 4 MG/1
4 TABLET, ORALLY DISINTEGRATING ORAL EVERY 4 HOURS PRN
Status: DISCONTINUED | OUTPATIENT
Start: 2018-07-09 | End: 2018-07-17 | Stop reason: HOSPADM

## 2018-07-09 RX ORDER — ONDANSETRON 2 MG/ML
4 INJECTION INTRAMUSCULAR; INTRAVENOUS EVERY 4 HOURS PRN
Status: DISCONTINUED | OUTPATIENT
Start: 2018-07-09 | End: 2018-07-17 | Stop reason: HOSPADM

## 2018-07-09 RX ORDER — AMOXICILLIN 250 MG
2 CAPSULE ORAL 2 TIMES DAILY
Status: DISCONTINUED | OUTPATIENT
Start: 2018-07-09 | End: 2018-07-17 | Stop reason: HOSPADM

## 2018-07-09 RX ORDER — ACETAMINOPHEN 325 MG/1
650 TABLET ORAL EVERY 6 HOURS PRN
Status: DISCONTINUED | OUTPATIENT
Start: 2018-07-09 | End: 2018-07-17 | Stop reason: HOSPADM

## 2018-07-09 RX ORDER — FAMOTIDINE 20 MG/1
20 TABLET, FILM COATED ORAL 2 TIMES DAILY
Status: DISCONTINUED | OUTPATIENT
Start: 2018-07-09 | End: 2018-07-17 | Stop reason: HOSPADM

## 2018-07-09 RX ORDER — FUROSEMIDE 10 MG/ML
20 INJECTION INTRAMUSCULAR; INTRAVENOUS
Status: DISCONTINUED | OUTPATIENT
Start: 2018-07-09 | End: 2018-07-12

## 2018-07-09 RX ADMIN — METOPROLOL TARTRATE 100 MG: 50 TABLET ORAL at 21:50

## 2018-07-09 RX ADMIN — FAMOTIDINE 20 MG: 20 TABLET ORAL at 21:49

## 2018-07-09 RX ADMIN — ASPIRIN 243 MG: 81 TABLET, CHEWABLE ORAL at 17:18

## 2018-07-09 RX ADMIN — FUROSEMIDE 20 MG: 10 INJECTION, SOLUTION INTRAMUSCULAR; INTRAVENOUS at 17:20

## 2018-07-09 RX ADMIN — IOHEXOL 65 ML: 350 INJECTION, SOLUTION INTRAVENOUS at 21:36

## 2018-07-09 RX ADMIN — PRAVASTATIN SODIUM 20 MG: 20 TABLET ORAL at 21:50

## 2018-07-09 ASSESSMENT — COPD QUESTIONNAIRES
COPD SCREENING SCORE: 1
DURING THE PAST 4 WEEKS HOW MUCH DID YOU FEEL SHORT OF BREATH: NONE/LITTLE OF THE TIME
DURING THE PAST 4 WEEKS HOW MUCH DID YOU FEEL SHORT OF BREATH: NONE/LITTLE OF THE TIME
HAVE YOU SMOKED AT LEAST 100 CIGARETTES IN YOUR ENTIRE LIFE: NO/DON'T KNOW
HAVE YOU SMOKED AT LEAST 100 CIGARETTES IN YOUR ENTIRE LIFE: NO/DON'T KNOW
COPD SCREENING SCORE: 1
DO YOU EVER COUGH UP ANY MUCUS OR PHLEGM?: NO/ONLY WITH OCCASIONAL COLDS OR INFECTIONS
IN THE PAST 12 MONTHS DO YOU DO LESS THAN YOU USED TO BECAUSE OF YOUR BREATHING PROBLEMS: DISAGREE/UNSURE
DO YOU EVER COUGH UP ANY MUCUS OR PHLEGM?: NO/ONLY WITH OCCASIONAL COLDS OR INFECTIONS

## 2018-07-09 ASSESSMENT — PAIN SCALES - GENERAL
PAINLEVEL_OUTOF10: 5
PAINLEVEL_OUTOF10: 4
PAINLEVEL_OUTOF10: 5

## 2018-07-09 ASSESSMENT — PATIENT HEALTH QUESTIONNAIRE - PHQ9
1. LITTLE INTEREST OR PLEASURE IN DOING THINGS: NOT AT ALL
2. FEELING DOWN, DEPRESSED, IRRITABLE, OR HOPELESS: NOT AT ALL
SUM OF ALL RESPONSES TO PHQ9 QUESTIONS 1 AND 2: 0

## 2018-07-09 ASSESSMENT — LIFESTYLE VARIABLES
EVER_SMOKED: NEVER
ALCOHOL_USE: NO
DO YOU DRINK ALCOHOL: NO
EVER_SMOKED: NEVER

## 2018-07-09 NOTE — DISCHARGE PLANNING
Care Transition Team Assessment    Met with pt at bedside. According to pt he lives with  room mates. Pt said he is independent at baseline, uses cane occasionally. No needs identified at this time. Pt confirmed he can call his room mate Kulwant for ride home at discharge.      Information Source  Orientation : Oriented x 4  Information Given By: Patient    Readmission Evaluation  Is this a readmission?: No    Elopement Risk  Legal Hold: No  Ambulatory or Self Mobile in Wheelchair: No-Not an Elopement Risk    Interdisciplinary Discharge Planning  Does Admitting Nurse Feel This Could be a Complex Discharge?: No  Primary Care Physician: Jerad Lomax MD  Lives with - Patient's Self Care Capacity: Other (Comments) (Room mates)  Do You Take your Prescribed Medications Regularly: Yes  Able to Return to Previous ADL's: Yes  Mobility Issues: Yes (Occassionally uses a cane)  Prior Services: None  Patient Expects to be Discharged to:: Home  Assistance Needed: No  Durable Medical Equipment:  (Cane)    Discharge Preparedness  What are your discharge supports?: Other (comment) (Room mate)  Prior Functional Level: Independent with Activities of Daily Living, Independent with Medication Management  Difficulity with ADLs: Walking  Difficulity with IADLs: None    Functional Assesment  Prior Functional Level: Independent with Activities of Daily Living, Independent with Medication Management    Finances  Prescription Coverage: Yes    Discharge Risks or Barriers  Discharge risks or barriers?: No    Anticipated Discharge Information  Anticipated discharge disposition: Home  Discharge Address: [address]  Discharge Contact Phone Number: [phone]

## 2018-07-09 NOTE — ASSESSMENT & PLAN NOTE
Followed by Dr. Sanchez- Cardiology - as OP  Currently rate controlled  lopressor  Continue Xarelto

## 2018-07-09 NOTE — ED TRIAGE NOTES
"Patient arrived via EMS c/o right chest pain since this morning, states his \"JAIDA-MÓNICA was acting up last night\".  Patient is well appearing, alert and oriented x4, placed on cardiac monitors. MD david saunders.  "

## 2018-07-09 NOTE — H&P
"Hospital Medicine History & Physical Note    Date of Service  7/9/2018    Primary Care Physician  Jerad Lomax M.D.    Consultants  None    Code Status  Full    Chief Complaint  Chest pain    History of Presenting Illness  53 y.o. Male with chronic atrial fibrillation on Xarelto, type 2 diabetes mellitus with neuropathy, hypertension, hyperlipidemia, and GERD.  Patient states last night, he felt palpitation like his A. fib is acting up.  He denied any other symptoms.  However this morning, at 830, he had an onset of intermittent chest pain in the left middle chest, described like a vice ,\" someone sitting on my chest\", and rated 5 out of 10 in severity.  The pain was associated with shortness of breath, sweating, nausea, and palpitations.  The pain came and went until 9:30 in the morning when it became constant, and more severe at 10 out of 10 in severity.  He was then brought to the ED.    Additionally, he denied any other symptoms such as abdominal pain, fevers, chills, diarrhea.  He does have chronic lower extremity edema, but according to him this remained unchanged.  He does admit to occasional paroxysmal nocturnal dyspnea, and sleeps with 2 pillows saying that he is unable to breathe when lying flat.  He states he is compliant with his medications.    ED course:  The patient was initially evaluated in the emergency department, with stable vital signs, and afebrile.  He was saturating well on room air.  Initial blood workup showed unimpressive CBC and BMP.  Her first troponin was negative.  BNP was 147.  Chest x-ray (my read) did not show any infiltrates or consolidation, with stable cardiomegaly.  EKG (personally reviewed) showed atrial fibrillation with RVR, with heart rate fluctuating between , without any acute ischemic changes.  He was subsequently admitted to the hospitalist service.      Review of Systems  ROS     Pertinent positives/negatives as mentioned above.     A complete review of systems " was done. All other systems were negative.       Past Medical History   has a past medical history of A-fib (HCC); Chest pain; Diabetes (HCC); Diabetic neuropathy (HCC); Hypercholesterolemia; Hypertension; Morbid obesity (HCC); Noncompliance; and Normal cardiac stress test.    Surgical History   has a past surgical history that includes toe amputation (Right, 11/10/2017); toe amputation (Right, 1/17/2018); and irrigation & debridement ortho (Right, 2/19/2018).     Family History  family history is not on file.     Social History   reports that he has never smoked. He has never used smokeless tobacco. He reports that he does not drink alcohol or use drugs.    Allergies  Allergies   Allergen Reactions   • Daptomycin Rash     Body rash  RXN=1/2018     • Pcn [Penicillins] Hives and Rash      Rash & hives  RXN=since a kid   • Unasyn [Ampicillin-Sulbactam Sodium] Hives, Itching and Swelling   • Vancomycin Rash     Red man syndrome       Medications  Prior to Admission Medications   Prescriptions Last Dose Informant Patient Reported? Taking?   acetaminophen (TYLENOL) 325 MG Tab 7/8/2018 at pm Patient No No   Sig: Take 2 Tabs by mouth every 6 hours as needed (Mild Pain; (Pain scale 1-3); Temp greater than 100.5 F).   aspirin 81 MG EC tablet 7/9/2018 at am Patient No No   Sig: Take 1 Tab by mouth every day.   famotidine (PEPCID) 20 MG Tab 7/9/2018 at am Patient No No   Sig: Take 1 Tab by mouth 2 times a day.   hydroCHLOROthiazide (HYDRODIURIL) 25 MG Tab 7/9/2018 at am Patient No No   Sig: Take 1 Tab by mouth every day.   lisinopril (PRINIVIL, ZESTRIL) 40 MG tablet 7/9/2018 at m Patient No No   Sig: Take 1 Tab by mouth every day.   metFORMIN (GLUCOPHAGE) 500 MG Tab 7/9/2018 at am Patient No No   Sig: Take 2 Tabs by mouth 2 times a day, with meals.   metoprolol (LOPRESSOR) 100 MG Tab 7/9/2018 at am Patient No No   Sig: Take 1 Tab by mouth 2 times a day.   pravastatin (PRAVACHOL) 20 MG Tab 7/8/2018 at pm Patient No No   Sig:  Take 1 Tab by mouth every evening.   rivaroxaban (XARELTO) 20 MG Tab tablet 7/8/2018 at pm Patient No No   Sig: Take 1 Tab by mouth with dinner.      Facility-Administered Medications: None       Physical Exam  Blood Pressure: 146/84   Temperature: 36.7 °C (98 °F)   Pulse: 95   Respiration: 14   Pulse Oximetry: 93 %     Physical Exam   Constitutional: He is oriented to person, place, and time. He appears well-developed. No distress.   Obese. Body mass index is 38.15 kg/m².   HENT:   Head: Normocephalic and atraumatic.   Mouth/Throat: Oropharynx is clear and moist. No oropharyngeal exudate.   Eyes: EOM are normal. Pupils are equal, round, and reactive to light. Right eye exhibits no discharge. Left eye exhibits no discharge. No scleral icterus.   Neck: Normal range of motion. Neck supple. JVD present. No thyromegaly present.   Cardiovascular: Normal rate.  Exam reveals no gallop and no friction rub.    No murmur heard.  Irregularly irregular rhythm   Pulmonary/Chest: Effort normal and breath sounds normal. He has no wheezes. He has no rales. He exhibits no tenderness.   Abdominal: Soft. Bowel sounds are normal. There is no tenderness. There is no rebound and no guarding.   Musculoskeletal: Normal range of motion. He exhibits edema (2+ B/L LE edema). He exhibits no tenderness.   Varicose veins on the lower extremities bilaterally   Lymphadenopathy:     He has no cervical adenopathy.   Neurological: He is alert and oriented to person, place, and time. No cranial nerve deficit. Coordination normal.   Skin: Skin is warm and dry. No rash noted. He is not diaphoretic. No erythema.   Psychiatric: He has a normal mood and affect. His behavior is normal. Thought content normal.   Vitals reviewed.      Laboratory:  Recent Labs      07/09/18   1030   WBC  9.3   RBC  5.27   HEMOGLOBIN  14.5   HEMATOCRIT  45.0   MCV  85.4   MCH  27.5   MCHC  32.2*   RDW  43.7   PLATELETCT  194   MPV  11.4     Recent Labs      07/09/18   1030    SODIUM  135   POTASSIUM  4.2   CHLORIDE  102   CO2  27   GLUCOSE  169*   BUN  24*   CREATININE  0.86   CALCIUM  9.6     Recent Labs      07/09/18   1030   GLUCOSE  169*     Recent Labs      07/09/18   1030   APTT  30.9   INR  1.11     Recent Labs      07/09/18   1030   BNPBTYPENAT  147*         Lab Results   Component Value Date    TROPONINI <0.01 07/09/2018       Urinalysis:    Lab Results   Component Value Date    SPECGRAVITY 1.025 03/16/2018    GLUCOSEUR Negative 03/16/2018    KETONES Negative 03/16/2018    NITRITE Negative 03/16/2018    WBCURINE 2-5 (A) 03/16/2018    RBCURINE 2-5 (A) 03/16/2018    BACTERIA Negative 03/16/2018    EPITHELCELL Few 03/16/2018        Imaging:  DX-CHEST-PORTABLE (1 VIEW)   Final Result      Stable enlargement of the cardiomediastinal silhouette without definite acute cardiopulmonary abnormality.      ECHOCARDIOGRAM COMP W/O CONT    (Results Pending)   NM-CARDIAC STRESS TEST    (Results Pending)         Assessment/Plan:  I anticipate this patient is appropriate for observation status at this time.    * Atypical chest pain- (present on admission)   Assessment & Plan    -Chest pain atypical.  Could be related to his atrial fibrillation/RVR. However, reasonable to pursue further cardiac workup.  -I will obtain serial troponins, and an echocardiogram.  Patient had a recent negative stress test back in January.  -Start empiric aspirin for now until cardiac cause ruled out.  Check lipid profile and hemoglobin A1c for further risk stratification.  -Start as needed sublingual nitroglycerin, and morphine for pain recurrence.   -We will do further cardiac monitoring to rule out arrhythmias.  -check d-dimer, and if elevated will proceed to rule out PE.  Check urine drug screen for completion.  -GI source also possible.  Resume home dose Pepcid.  I will start him on as needed GI cocktail and Maalox.  -May also have component of fluid overload, with JVD, occasional orthopnea, and PND.  No history  of CHF.  I will start him on diuresis with IV Lasix 20 mg twice a day.  Echocardiogram as above to evaluate ventricular function.          Atrial fibrillation with RVR (HCC)- (present on admission)   Assessment & Plan    -With heart rate fluctuating between .  -We will monitor him on telemetry, and if heart rate persistently elevated more than 110, will need adjustment in his rate controller.  He is already on Lopressor 100 mg twice, may need addition of calcium channel blocker or digoxin.   -Resume Xarelto.        Acquired circulating anticoagulants (HCC)- (present on admission)   Assessment & Plan    -Resume home dose Xarelto for atrial fibrillation.        HTN (hypertension)- (present on admission)   Assessment & Plan    -Resume home dose Lopressor, lisinopril, and HydroDIURIL.  Monitor blood pressure trend closely, with as needed IV labetalol, oral clonidine, and IV Vasotec for significant hypertension.        HLD (hyperlipidemia)- (present on admission)   Assessment & Plan    -Resume home dose Pravachol.  Check lipid profile.        GERD (gastroesophageal reflux disease)- (present on admission)   Assessment & Plan    -Resume home dose Pepcid, along with as needed Maalox and GI cocktail.        Type 2 diabetes mellitus (HCC)- (present on admission)   Assessment & Plan    -I will hold his metformin for now, and place him on sliding scale insulin while in-house.  Check hemoglobin A1c.  Accu-Cheks before meals and HS. diabetic diet.            VTE prophylaxis: xarelto

## 2018-07-09 NOTE — ED PROVIDER NOTES
ED Provider Note    CHIEF COMPLAINT  Chief Complaint   Patient presents with   • Chest Pain     right side chest pain, atrial fibrillation with history, on xarelto       HPI  Sebastián Castro is a 53 y.o. male who presents with chest pain. The patient states he awoke this morning with right-sided chest pain. He describes it as a pressure. He does have associated dyspnea and also developed some tingling to his feet. The patient states he is also felt like his atrial fibrillation has been uncontrolled as he has been feeling some pounding in his chest. He does take xeralto and he's been compliant. He does not have any acute pain or swelling to his lower extremities. Currently his pain is mild in intensity. The patient did not have any associated nausea or diaphoresis. He is unaware of any exacerbating or relieving factors. He does have significant cardiac risk factors including diabetes, hypertension, dyslipidemia, and morbid obesity.    REVIEW OF SYSTEMS  See [facility] for further details. All other systems are negative.     PAST MEDICAL HISTORY  Past Medical History:   Diagnosis Date   • A-fib (HCC)    • Chest pain    • Diabetes (HCC)    • Diabetic neuropathy (HCC)    • Hypercholesterolemia    • Hypertension    • Morbid obesity (HCC)    • Noncompliance    • Normal cardiac stress test        SOCIAL HISTORY  Social History     Social History   • Marital status:      Spouse name: N/A   • Number of children: N/A   • Years of education: N/A     Social History Main Topics   • Smoking status: Never Smoker   • Smokeless tobacco: Never Used   • Alcohol use No   • Drug use: No   • Sexual activity: Not on file     Other Topics Concern   • Not on file     Social History Narrative   • No narrative on file           PHYSICAL EXAM  VITAL SIGNS: /93   Pulse (!) 104   Temp 36.7 °C (98 °F)   Resp 18   Wt (!) 130.2 kg (287 lb)   BMI 38.92 kg/m²   Constitutional: Obese but in no acute distress.   HENT: Normocephalic,  Atraumatic, tympanic membranes are intact and nonerythematous bilaterally, Oropharynx moist without exudates or erythema, Nose normal.   Eyes: PERRLA, EOMI, Conjunctiva normal.  Neck: Supple without meningismus  Lymphatic: No lymphadenopathy noted.   Cardiovascular: Tachycardic heart rate, irregularly irregular rhythm, No murmurs, No rubs, No gallops.   Thorax & Lungs: Symmetrically diminished throughout, No respiratory distress, No wheezing, No chest tenderness.   Abdomen: Bowel sounds normal, Soft, No tenderness, no rebound, no guarding, no distention, No masses, No pulsatile masses.   Skin: Warm, Dry, No erythema, No rash.   Back: No tenderness, No CVA tenderness.   Extremities: Atraumatic with symmetric distal pulses, No edema, No tenderness, No cyanosis, No clubbing.   Neurologic: Alert & oriented x 3, cranial nerves II through XII are intact, Normal motor function, Normal sensory function, No focal deficits noted.   Psychiatric: Affect normal, Judgment normal, Mood normal.     EKG  12-lead EKG interpreted by myself shows atrial fibrillation with a ventricular rate of 103, QRS is poor R-wave progression and left axis deviation, no ST segment elevation or depression, normal T waves.    RADIOLOGY/PROCEDURES  DX-CHEST-PORTABLE (1 VIEW)   Final Result      Stable enlargement of the cardiomediastinal silhouette without definite acute cardiopulmonary abnormality.            COURSE & MEDICAL DECISION MAKING  Pertinent Labs & Imaging studies reviewed. (See chart for details)  This is a 53-year-old gentleman who presents with chest discomfort and dyspnea. He does have significant cardiac risk factors including cardiac disease. However he is on anticoagulants for his atrial fibrillation and is to make ischemia less likely. Otherwise I don't see any evidence of pulmonary source. Pulmonary also be unlikely as the patient does not have any lower extremity pain. Furthermore does not have any risk factors and his artery on  anticoagulation. The patient's abdomen is benign and therefore referred pain to be unlikely. The chest x-ray does not show any evidence of pneumonia. I will have a clear source for his chest pain at this time and will get the patient to the chest pain unit for further workup and cardiac rule out. On repeat examination he is currently resting comfortably.    FINAL IMPRESSION  1. Chest pain       Disposition  The patient will be admitted in stable condition    Electronically signed by: Roe Stoll, 7/9/2018 10:58 AM

## 2018-07-09 NOTE — ASSESSMENT & PLAN NOTE
He complainsStress test in January was negative  With taking a deep breath  Echo showed reduced EF from previous study 6 months ago -EF now 40% down from 60%  ACE and BB  Troponins negative  EKG shows his atrial fibrillation without any signs of ischemia  ASA and statin  OP Cardiology FU

## 2018-07-09 NOTE — PROGRESS NOTES
Report rec'd in ED.  Patient transported to CDU T 214 via gurney with RN.  Dr Butterfield at the bedside at this time.

## 2018-07-10 ENCOUNTER — APPOINTMENT (OUTPATIENT)
Dept: RADIOLOGY | Facility: MEDICAL CENTER | Age: 53
DRG: 313 | End: 2018-07-10
Attending: NURSE PRACTITIONER
Payer: MEDICAID

## 2018-07-10 PROBLEM — I10 HTN (HYPERTENSION): Chronic | Status: ACTIVE | Noted: 2018-02-08
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
## 2018-07-10 PROBLEM — E78.5 HLD (HYPERLIPIDEMIA): Chronic | Status: ACTIVE | Noted: 2018-01-15

## 2018-07-10 PROBLEM — K21.9 GERD (GASTROESOPHAGEAL REFLUX DISEASE): Chronic | Status: ACTIVE | Noted: 2018-01-15

## 2018-07-10 PROBLEM — I48.0 PAROXYSMAL A-FIB (HCC): Status: ACTIVE | Noted: 2018-07-09

## 2018-07-10 PROBLEM — E11.9 TYPE 2 DIABETES MELLITUS (HCC): Chronic | Status: ACTIVE | Noted: 2017-11-22

## 2018-07-10 LAB
ANION GAP SERPL CALC-SCNC: 6 MMOL/L (ref 0–11.9)
BASOPHILS # BLD AUTO: 0.3 % (ref 0–1.8)
BASOPHILS # BLD: 0.03 K/UL (ref 0–0.12)
BUN SERPL-MCNC: 22 MG/DL (ref 8–22)
CALCIUM SERPL-MCNC: 8.9 MG/DL (ref 8.5–10.5)
CHLORIDE SERPL-SCNC: 102 MMOL/L (ref 96–112)
CHOLEST SERPL-MCNC: 119 MG/DL (ref 100–199)
CO2 SERPL-SCNC: 28 MMOL/L (ref 20–33)
CREAT SERPL-MCNC: 0.82 MG/DL (ref 0.5–1.4)
EOSINOPHIL # BLD AUTO: 0.3 K/UL (ref 0–0.51)
EOSINOPHIL NFR BLD: 3.1 % (ref 0–6.9)
ERYTHROCYTE [DISTWIDTH] IN BLOOD BY AUTOMATED COUNT: 43 FL (ref 35.9–50)
GLUCOSE BLD-MCNC: 128 MG/DL (ref 65–99)
GLUCOSE BLD-MCNC: 148 MG/DL (ref 65–99)
GLUCOSE SERPL-MCNC: 130 MG/DL (ref 65–99)
HCT VFR BLD AUTO: 43.2 % (ref 42–52)
HDLC SERPL-MCNC: 24 MG/DL
HGB BLD-MCNC: 14 G/DL (ref 14–18)
IMM GRANULOCYTES # BLD AUTO: 0.03 K/UL (ref 0–0.11)
IMM GRANULOCYTES NFR BLD AUTO: 0.3 % (ref 0–0.9)
LDLC SERPL CALC-MCNC: 68 MG/DL
LYMPHOCYTES # BLD AUTO: 2.34 K/UL (ref 1–4.8)
LYMPHOCYTES NFR BLD: 24.3 % (ref 22–41)
MCH RBC QN AUTO: 27.5 PG (ref 27–33)
MCHC RBC AUTO-ENTMCNC: 32.4 G/DL (ref 33.7–35.3)
MCV RBC AUTO: 84.7 FL (ref 81.4–97.8)
MONOCYTES # BLD AUTO: 0.65 K/UL (ref 0–0.85)
MONOCYTES NFR BLD AUTO: 6.7 % (ref 0–13.4)
NEUTROPHILS # BLD AUTO: 6.28 K/UL (ref 1.82–7.42)
NEUTROPHILS NFR BLD: 65.3 % (ref 44–72)
NRBC # BLD AUTO: 0 K/UL
NRBC BLD-RTO: 0 /100 WBC
PLATELET # BLD AUTO: 204 K/UL (ref 164–446)
PMV BLD AUTO: 11.5 FL (ref 9–12.9)
POTASSIUM SERPL-SCNC: 3.8 MMOL/L (ref 3.6–5.5)
RBC # BLD AUTO: 5.1 M/UL (ref 4.7–6.1)
SODIUM SERPL-SCNC: 136 MMOL/L (ref 135–145)
TRIGL SERPL-MCNC: 136 MG/DL (ref 0–149)
WBC # BLD AUTO: 9.6 K/UL (ref 4.8–10.8)

## 2018-07-10 PROCEDURE — 700111 HCHG RX REV CODE 636 W/ 250 OVERRIDE (IP): Performed by: INTERNAL MEDICINE

## 2018-07-10 PROCEDURE — G8988 SELF CARE GOAL STATUS: HCPCS | Mod: CI

## 2018-07-10 PROCEDURE — 700102 HCHG RX REV CODE 250 W/ 637 OVERRIDE(OP): Performed by: INTERNAL MEDICINE

## 2018-07-10 PROCEDURE — G8978 MOBILITY CURRENT STATUS: HCPCS | Mod: CJ

## 2018-07-10 PROCEDURE — 97165 OT EVAL LOW COMPLEX 30 MIN: CPT

## 2018-07-10 PROCEDURE — 85025 COMPLETE CBC W/AUTO DIFF WBC: CPT

## 2018-07-10 PROCEDURE — 97161 PT EVAL LOW COMPLEX 20 MIN: CPT

## 2018-07-10 PROCEDURE — 36415 COLL VENOUS BLD VENIPUNCTURE: CPT

## 2018-07-10 PROCEDURE — 80061 LIPID PANEL: CPT

## 2018-07-10 PROCEDURE — 96376 TX/PRO/DX INJ SAME DRUG ADON: CPT

## 2018-07-10 PROCEDURE — 70450 CT HEAD/BRAIN W/O DYE: CPT

## 2018-07-10 PROCEDURE — G8979 MOBILITY GOAL STATUS: HCPCS | Mod: CI

## 2018-07-10 PROCEDURE — A9270 NON-COVERED ITEM OR SERVICE: HCPCS | Performed by: INTERNAL MEDICINE

## 2018-07-10 PROCEDURE — G8987 SELF CARE CURRENT STATUS: HCPCS | Mod: CJ

## 2018-07-10 PROCEDURE — 73564 X-RAY EXAM KNEE 4 OR MORE: CPT | Mod: LT

## 2018-07-10 PROCEDURE — 82962 GLUCOSE BLOOD TEST: CPT

## 2018-07-10 PROCEDURE — G0378 HOSPITAL OBSERVATION PER HR: HCPCS

## 2018-07-10 PROCEDURE — 93970 EXTREMITY STUDY: CPT

## 2018-07-10 PROCEDURE — 99225 PR SUBSEQUENT OBSERVATION CARE,LEVEL II: CPT | Performed by: INTERNAL MEDICINE

## 2018-07-10 PROCEDURE — 80048 BASIC METABOLIC PNL TOTAL CA: CPT

## 2018-07-10 RX ADMIN — MORPHINE SULFATE 1 MG: 4 INJECTION INTRAVENOUS at 08:38

## 2018-07-10 RX ADMIN — METOPROLOL TARTRATE 100 MG: 50 TABLET ORAL at 06:13

## 2018-07-10 RX ADMIN — HYDROCHLOROTHIAZIDE 25 MG: 25 TABLET ORAL at 06:14

## 2018-07-10 RX ADMIN — ACETAMINOPHEN 650 MG: 325 TABLET, FILM COATED ORAL at 14:33

## 2018-07-10 RX ADMIN — ASPIRIN 325 MG: 325 TABLET ORAL at 06:11

## 2018-07-10 RX ADMIN — METOPROLOL TARTRATE 100 MG: 50 TABLET ORAL at 18:11

## 2018-07-10 RX ADMIN — LISINOPRIL 40 MG: 20 TABLET ORAL at 06:12

## 2018-07-10 RX ADMIN — PRAVASTATIN SODIUM 20 MG: 20 TABLET ORAL at 18:06

## 2018-07-10 RX ADMIN — FUROSEMIDE 20 MG: 10 INJECTION, SOLUTION INTRAMUSCULAR; INTRAVENOUS at 06:16

## 2018-07-10 RX ADMIN — FAMOTIDINE 20 MG: 20 TABLET ORAL at 18:06

## 2018-07-10 RX ADMIN — RIVAROXABAN 20 MG: 20 TABLET, FILM COATED ORAL at 08:39

## 2018-07-10 RX ADMIN — FUROSEMIDE 20 MG: 10 INJECTION, SOLUTION INTRAMUSCULAR; INTRAVENOUS at 18:06

## 2018-07-10 RX ADMIN — FAMOTIDINE 20 MG: 20 TABLET ORAL at 06:11

## 2018-07-10 ASSESSMENT — COGNITIVE AND FUNCTIONAL STATUS - GENERAL
MOVING TO AND FROM BED TO CHAIR: A LITTLE
TURNING FROM BACK TO SIDE WHILE IN FLAT BAD: A LITTLE
TOILETING: A LITTLE
DAILY ACTIVITIY SCORE: 21
SUGGESTED CMS G CODE MODIFIER DAILY ACTIVITY: CJ
MOVING FROM LYING ON BACK TO SITTING ON SIDE OF FLAT BED: A LITTLE
STANDING UP FROM CHAIR USING ARMS: A LITTLE
CLIMB 3 TO 5 STEPS WITH RAILING: TOTAL
SUGGESTED CMS G CODE MODIFIER MOBILITY: CK
DRESSING REGULAR LOWER BODY CLOTHING: A LITTLE
MOBILITY SCORE: 15
HELP NEEDED FOR BATHING: A LITTLE
WALKING IN HOSPITAL ROOM: A LOT

## 2018-07-10 ASSESSMENT — PAIN SCALES - GENERAL
PAINLEVEL_OUTOF10: 6
PAINLEVEL_OUTOF10: 0
PAINLEVEL_OUTOF10: 2
PAINLEVEL_OUTOF10: 9
PAINLEVEL_OUTOF10: 6
PAINLEVEL_OUTOF10: 4

## 2018-07-10 ASSESSMENT — ENCOUNTER SYMPTOMS
WHEEZING: 0
SHORTNESS OF BREATH: 0
DOUBLE VISION: 0
WEAKNESS: 0
NAUSEA: 0
SENSORY CHANGE: 0
TINGLING: 0
CONSTIPATION: 0
HEARTBURN: 0
FOCAL WEAKNESS: 0
HEADACHES: 0
NERVOUS/ANXIOUS: 0
VOMITING: 0
ABDOMINAL PAIN: 0
FALLS: 1
DIARRHEA: 0
PALPITATIONS: 0
BLURRED VISION: 0
MEMORY LOSS: 0

## 2018-07-10 ASSESSMENT — GAIT ASSESSMENTS
DISTANCE (FEET): 5
ASSISTIVE DEVICE: FRONT WHEEL WALKER
GAIT LEVEL OF ASSIST: MODERATE ASSIST

## 2018-07-10 ASSESSMENT — ACTIVITIES OF DAILY LIVING (ADL): TOILETING: INDEPENDENT

## 2018-07-10 NOTE — CARE PLAN
Problem: Communication  Goal: The ability to communicate needs accurately and effectively will improve    Intervention: [place] patient and significant other/support system to call light to alert staff of needs  Pt oriented to unit: location of bathrooms, visiting hours, use of call light, typical LOS on unit and unit routine.       Problem: Safety  Goal: Will remain free from falls  Outcome: PROGRESSING AS EXPECTED  Pt low fall risk dt generalized weakness. Fall precautions in place. Assisted with ambulation and into wheelchair for transport by RN. Pt instructed on use of call light; pt call appropriately.

## 2018-07-10 NOTE — PROGRESS NOTES
Admit profile completed.  Pt medicated per MAR.  Pt reports he takes xarelto in AM and had this morning.  Will discuss with pharmacy.

## 2018-07-10 NOTE — THERAPY
"Physical Therapy Evaluation completed.   Bed Mobility:  Supine to Sit: Stand by Assist  Transfers: Sit to Stand: Minimal Assist  Gait: Level Of Assist: Moderate Assist with Front-Wheel Walker       Plan of Care: Will benefit from Physical Therapy 3 times per week and Plan to complete next treatment by Wednesday 7/11  Discharge Recommendations: Equipment: Will Continue to Assess for Equipment Needs. Post-acute therapy Discharge to a transitional care facility for continued skilled therapy services vs Discharge to home with outpatient or home health for additional skilled therapy services, depending on progress made, plan to check on pt tomorrow morning    Pt is a 53 year old male admitted to the hospital for chest pain, pt does have history of A-fib. Pt also with history of diabetes as well as great toe amputation in November of 2017. Pt was set to DC home today, however, had a GLF in his room this am, landing on his L knee. Pt now having difficulty with mobility and pain limiting function. At time of initial evaluation, pt presents with decreased functional mobility,d decreased functional strength, difficulty with transfers, decreased balance, difficulty with ambulation, pain and limited activity tolerance. Pt only able to take a few steps at EOB with FWW with minimal to moderate assist. Pt's L knee buckling with WB through L LE. Pt would benefit from skilled PT intervention while in the acute care setting to address the listed deficits and improve mobility prior to DC. Will continue to assess for post-acute therapy recommendations. Plan to check on pt tomorrow am to see if progress has been made    See \"Rehab Therapy-Acute\" Patient Summary Report for complete documentation.     "

## 2018-07-10 NOTE — PROGRESS NOTES
"Complaints of sob. Was tachypneic. Denies dizziness or increasing pain. 94%sa o2 on ra. Placed on 1L o2 nc for comfort. Reports feeling \"better\". Resting now.   "

## 2018-07-10 NOTE — PROGRESS NOTES
Pt returned from CT. Tele monitor placed; heat applied to left knee for pt comfort. Pending imaging.

## 2018-07-10 NOTE — PROGRESS NOTES
PT and Ot working with pt. Pt not safe for discharge at this time due impaired mobility. PT/Ot to re-eval in AM.

## 2018-07-10 NOTE — PROGRESS NOTES
Bedside report received 0700. POC discussed with pt; CP 6/10, Of same type; Medicated per MAR; Pending LE dup. results; Pt verbalizes concern of going home today; all questions answered at this time.

## 2018-07-10 NOTE — THERAPY
"Occupational Therapy Evaluation completed.   Functional Status: Pt initially presented to hospital following CP and was cleared to d/c home earlier today, h/o R great toe amputation in November 2017 . However, pt had a GLF this morning resulting in L knee pain. Pt performed bed mobility with sba, seated base donned/doffed socks with sba increased time required for LLE, sba for UB dressing, declined oral care. Pt is primarily limited by L knee pain, unable to wb through LLE, required min/mod a with attempts of functional mobility, decreased activity tolerance 2/2 pain. Pt demonstrates ability to perform seated ADLs but oob ADLs and functional mobility limited due to above mentioned deficits. Pt currently doesn't appear to have capacity to d/c home safely, will attempt tx tomorrow morning to see if pt had made any progress otherwise may need short in-patient therapy stay prior to d/c home.   Plan of Care: Will benefit from Occupational Therapy 3 times per week  Discharge Recommendations:  Equipment: Will Continue to Assess for Equipment Needs. Post-acute therapy Discharge to a transitional care facility for continued skilled therapy services. and Discharge to home with outpatient or home health for additional skilled therapy services pending progress with therapy tomorrow am.      See \"Rehab Therapy-Acute\" Patient Summary Report for complete documentation.    "

## 2018-07-10 NOTE — PROGRESS NOTES
"Assumed care of patient at 1900. Initial assessment completed. Patient is A&Ox4 and able to make needs known. Patient c/o 4/10 CP that \"comes and goes\" and mild positional SOB, the same that brought him the ER.  Patient is afib per baseline on monitor with no signs of distress. O2 sat 94% on RA. Lung sounds clear/diminished in bilateral upper lobes and diminished in RML, RLL, LLL. POC discussed with pt: AM labs, LE duplex tomorrow. No further questions at this time. Fall precautions in place. Bed locked and in the lowest position, call light instructions provided, call light within reach.  "

## 2018-07-10 NOTE — PROGRESS NOTES
"Went into pt's room to turn on tele monitor, while in room pt states \"the nurse turned off the monitor\". RN notified.  "

## 2018-07-10 NOTE — PROGRESS NOTES
Pt found down on floor. Post fall initiated. Pt states tripped over sheets when standing at edge of bed. Non- skid socks and fall risk intervention in place at time of fall;Neuro assessment completed,intact with no changes;VSS; FSBG 148; Per pt left knee and head were impacted in fall;Left knee tender to touch, pain with movement; No hematoma or swelling to forehead; Suki Sigala NP at bedside, notified of fall. All fall precautions put back in place; Pt re-educated on use of call light and assistance with ambulation, Verbalized understanding.

## 2018-07-11 ENCOUNTER — HOME HEALTH ADMISSION (OUTPATIENT)
Dept: HOME HEALTH SERVICES | Facility: HOME HEALTHCARE | Age: 53
End: 2018-07-11
Payer: MEDICAID

## 2018-07-11 PROBLEM — H10.30 ACUTE CONJUNCTIVITIS: Status: ACTIVE | Noted: 2018-07-11

## 2018-07-11 LAB
ANION GAP SERPL CALC-SCNC: 6 MMOL/L (ref 0–11.9)
BASOPHILS # BLD AUTO: 0.6 % (ref 0–1.8)
BASOPHILS # BLD: 0.07 K/UL (ref 0–0.12)
BUN SERPL-MCNC: 28 MG/DL (ref 8–22)
CALCIUM SERPL-MCNC: 9.5 MG/DL (ref 8.5–10.5)
CHLORIDE SERPL-SCNC: 96 MMOL/L (ref 96–112)
CO2 SERPL-SCNC: 32 MMOL/L (ref 20–33)
CREAT SERPL-MCNC: 1.11 MG/DL (ref 0.5–1.4)
EOSINOPHIL # BLD AUTO: 0.23 K/UL (ref 0–0.51)
EOSINOPHIL NFR BLD: 2.1 % (ref 0–6.9)
ERYTHROCYTE [DISTWIDTH] IN BLOOD BY AUTOMATED COUNT: 42.6 FL (ref 35.9–50)
GLUCOSE SERPL-MCNC: 171 MG/DL (ref 65–99)
HCT VFR BLD AUTO: 47 % (ref 42–52)
HGB BLD-MCNC: 15.5 G/DL (ref 14–18)
IMM GRANULOCYTES # BLD AUTO: 0.04 K/UL (ref 0–0.11)
IMM GRANULOCYTES NFR BLD AUTO: 0.4 % (ref 0–0.9)
LYMPHOCYTES # BLD AUTO: 2.4 K/UL (ref 1–4.8)
LYMPHOCYTES NFR BLD: 21.8 % (ref 22–41)
MAGNESIUM SERPL-MCNC: 1.9 MG/DL (ref 1.5–2.5)
MCH RBC QN AUTO: 28 PG (ref 27–33)
MCHC RBC AUTO-ENTMCNC: 33 G/DL (ref 33.7–35.3)
MCV RBC AUTO: 85 FL (ref 81.4–97.8)
MONOCYTES # BLD AUTO: 0.66 K/UL (ref 0–0.85)
MONOCYTES NFR BLD AUTO: 6 % (ref 0–13.4)
NEUTROPHILS # BLD AUTO: 7.63 K/UL (ref 1.82–7.42)
NEUTROPHILS NFR BLD: 69.1 % (ref 44–72)
NRBC # BLD AUTO: 0 K/UL
NRBC BLD-RTO: 0 /100 WBC
PLATELET # BLD AUTO: 216 K/UL (ref 164–446)
PMV BLD AUTO: 11.3 FL (ref 9–12.9)
POTASSIUM SERPL-SCNC: 3.9 MMOL/L (ref 3.6–5.5)
RBC # BLD AUTO: 5.53 M/UL (ref 4.7–6.1)
SODIUM SERPL-SCNC: 134 MMOL/L (ref 135–145)
WBC # BLD AUTO: 11 K/UL (ref 4.8–10.8)

## 2018-07-11 PROCEDURE — 700102 HCHG RX REV CODE 250 W/ 637 OVERRIDE(OP): Performed by: INTERNAL MEDICINE

## 2018-07-11 PROCEDURE — 99225 PR SUBSEQUENT OBSERVATION CARE,LEVEL II: CPT | Performed by: INTERNAL MEDICINE

## 2018-07-11 PROCEDURE — 36415 COLL VENOUS BLD VENIPUNCTURE: CPT

## 2018-07-11 PROCEDURE — 700111 HCHG RX REV CODE 636 W/ 250 OVERRIDE (IP): Performed by: INTERNAL MEDICINE

## 2018-07-11 PROCEDURE — 97530 THERAPEUTIC ACTIVITIES: CPT

## 2018-07-11 PROCEDURE — 80048 BASIC METABOLIC PNL TOTAL CA: CPT

## 2018-07-11 PROCEDURE — G0378 HOSPITAL OBSERVATION PER HR: HCPCS

## 2018-07-11 PROCEDURE — 97116 GAIT TRAINING THERAPY: CPT

## 2018-07-11 PROCEDURE — 85025 COMPLETE CBC W/AUTO DIFF WBC: CPT

## 2018-07-11 PROCEDURE — 96376 TX/PRO/DX INJ SAME DRUG ADON: CPT

## 2018-07-11 PROCEDURE — 302255 BARRIER CREAM MOISTURE BAZA PROTECT (ZINC) 5OZ: Performed by: INTERNAL MEDICINE

## 2018-07-11 PROCEDURE — A6250 SKIN SEAL PROTECT MOISTURIZR: HCPCS | Performed by: INTERNAL MEDICINE

## 2018-07-11 PROCEDURE — A9270 NON-COVERED ITEM OR SERVICE: HCPCS | Performed by: INTERNAL MEDICINE

## 2018-07-11 PROCEDURE — 83735 ASSAY OF MAGNESIUM: CPT

## 2018-07-11 PROCEDURE — 700101 HCHG RX REV CODE 250: Performed by: INTERNAL MEDICINE

## 2018-07-11 RX ORDER — CIPROFLOXACIN HYDROCHLORIDE 3.5 MG/ML
1 SOLUTION/ DROPS TOPICAL
Status: DISPENSED | OUTPATIENT
Start: 2018-07-11 | End: 2018-07-16

## 2018-07-11 RX ADMIN — CIPROFLOXACIN HYDROCHLORIDE 1 DROP: 3 SOLUTION/ DROPS OPHTHALMIC at 17:58

## 2018-07-11 RX ADMIN — HYDROCHLOROTHIAZIDE 25 MG: 25 TABLET ORAL at 06:00

## 2018-07-11 RX ADMIN — CIPROFLOXACIN HYDROCHLORIDE 1 DROP: 3 SOLUTION/ DROPS OPHTHALMIC at 21:53

## 2018-07-11 RX ADMIN — METOPROLOL TARTRATE 100 MG: 50 TABLET ORAL at 05:35

## 2018-07-11 RX ADMIN — CIPROFLOXACIN HYDROCHLORIDE 1 DROP: 3 SOLUTION/ DROPS OPHTHALMIC at 08:00

## 2018-07-11 RX ADMIN — CIPROFLOXACIN HYDROCHLORIDE 1 DROP: 3 SOLUTION/ DROPS OPHTHALMIC at 10:00

## 2018-07-11 RX ADMIN — ACETAMINOPHEN 650 MG: 325 TABLET, FILM COATED ORAL at 12:34

## 2018-07-11 RX ADMIN — ACETAMINOPHEN 650 MG: 325 TABLET, FILM COATED ORAL at 21:54

## 2018-07-11 RX ADMIN — FUROSEMIDE 20 MG: 10 INJECTION, SOLUTION INTRAMUSCULAR; INTRAVENOUS at 05:23

## 2018-07-11 RX ADMIN — FUROSEMIDE 20 MG: 10 INJECTION, SOLUTION INTRAMUSCULAR; INTRAVENOUS at 15:35

## 2018-07-11 RX ADMIN — FAMOTIDINE 20 MG: 20 TABLET ORAL at 17:58

## 2018-07-11 RX ADMIN — RIVAROXABAN 20 MG: 20 TABLET, FILM COATED ORAL at 09:00

## 2018-07-11 RX ADMIN — PRAVASTATIN SODIUM 20 MG: 20 TABLET ORAL at 21:54

## 2018-07-11 RX ADMIN — LISINOPRIL 40 MG: 20 TABLET ORAL at 05:36

## 2018-07-11 RX ADMIN — CIPROFLOXACIN HYDROCHLORIDE 1 DROP: 3 SOLUTION/ DROPS OPHTHALMIC at 15:36

## 2018-07-11 RX ADMIN — FAMOTIDINE 20 MG: 20 TABLET ORAL at 05:33

## 2018-07-11 RX ADMIN — CIPROFLOXACIN HYDROCHLORIDE 1 DROP: 3 SOLUTION/ DROPS OPHTHALMIC at 12:27

## 2018-07-11 RX ADMIN — ASPIRIN 325 MG: 325 TABLET ORAL at 05:22

## 2018-07-11 ASSESSMENT — GAIT ASSESSMENTS
GAIT LEVEL OF ASSIST: STAND BY ASSIST
ASSISTIVE DEVICE: FRONT WHEEL WALKER
DISTANCE (FEET): 25
DEVIATION: ANTALGIC;BRADYKINETIC;DECREASED HEEL STRIKE;DECREASED TOE OFF

## 2018-07-11 ASSESSMENT — PAIN SCALES - GENERAL
PAINLEVEL_OUTOF10: 1
PAINLEVEL_OUTOF10: 3
PAINLEVEL_OUTOF10: 2
PAINLEVEL_OUTOF10: 2
PAINLEVEL_OUTOF10: 4
PAINLEVEL_OUTOF10: 3

## 2018-07-11 ASSESSMENT — COGNITIVE AND FUNCTIONAL STATUS - GENERAL
MOBILITY SCORE: 20
STANDING UP FROM CHAIR USING ARMS: A LITTLE
SUGGESTED CMS G CODE MODIFIER MOBILITY: CJ
WALKING IN HOSPITAL ROOM: A LITTLE
CLIMB 3 TO 5 STEPS WITH RAILING: A LITTLE
MOVING FROM LYING ON BACK TO SITTING ON SIDE OF FLAT BED: A LITTLE

## 2018-07-11 ASSESSMENT — ENCOUNTER SYMPTOMS
DOUBLE VISION: 0
DIARRHEA: 0
SHORTNESS OF BREATH: 0
PALPITATIONS: 0
TINGLING: 0
FALLS: 1
VOMITING: 0
NERVOUS/ANXIOUS: 0
MEMORY LOSS: 0
WEAKNESS: 0
FOCAL WEAKNESS: 0
HEARTBURN: 0
ABDOMINAL PAIN: 0
CONSTIPATION: 0
HEADACHES: 0
BLURRED VISION: 0
SENSORY CHANGE: 0
WHEEZING: 0
NAUSEA: 0

## 2018-07-11 NOTE — DISCHARGE PLANNING
ATTN: Case Management  RE: Referral for Home Health                We would like to take this opportunity to thank you for submitting a referral for your patient to continue care with [facility]. Our skilled team is dedicated to helping all patients recover and gain independence in the home setting.            As of 7/11/2018, we have accepted the above patient into our service. A [facility] clinician will be out to see the patient within 48 hours to conduct our initial visit. If you have any questions or concerns regarding the patient’s transition to Home Health, please do not hesitate to contact us. We are open for referrals 7 days a week from 8AM to 5PM at [phone].      We look forward to collaborating with you,  [facility] Team

## 2018-07-11 NOTE — DISCHARGE PLANNING
[facility] has received your referral and it is currently pending review by our Clinical Nursing Supervisors. Once a decision has been made this [facility]CS will notify [facility] of our decision.     Thank you

## 2018-07-11 NOTE — DISCHARGE PLANNING
Met with pt to provide choice of HH services and DME for FWW. Pt chose [facility] as he had their services previously and [facility]. Faxed choice form to [facility].

## 2018-07-11 NOTE — DISCHARGE PLANNING
We are currently verifying MD acceptance of your patient. . Thank you for your patience.  Respectfully,   [facility]

## 2018-07-11 NOTE — THERAPY
"Occupational Therapy Treatment completed with focus on ADLs.  Functional Status:  Pt seen for OT tx today, pt oob limited by hypotension during session. Pt performed supine to sit and reverse with sba, STS eob with cga and maintain static standing ~2mins with close cga and no c/o L knee pain. Pt's BP in sitting eob 101/68 mmHg, standing ~1-2 mins 82/50 mmHg, return back to sitting eob 98/66 and sitting upright in bed 123/75. Also, pt c/o new onset of incontinence with bladder and bowel, reports unable to realize when he has to go and unable to feel the wetness post accident. Pt does not appear to be safe d/c home given above medical presentation today with session. Will attempt to see pt tomorrow as able. Please see hard chart for full details and assessment.   Plan of Care: Will benefit from Occupational Therapy 3 times per week  Discharge Recommendations:  Equipment Will Continue to Assess for Equipment Needs.       See \"Rehab Therapy-Acute\" Patient Summary Report for complete documentation.   "

## 2018-07-11 NOTE — PROGRESS NOTES
Continent/incontinent urine. Ambulated up to the door with fww and sba. Starts to have pain and states cannot go all the way to the bathroom. Transferred to the wheelchair then showered with tech assist. Eyes appears more red with yellowish/brownish drainage noted. Instructed not to rub eyes. Left worse than right. Using saline flush to clean. Denies vision problems.

## 2018-07-11 NOTE — DISCHARGE PLANNING
Pt provided with FWW as well as contact number for [facility]. Pt will be transferred to another unit.

## 2018-07-11 NOTE — PROGRESS NOTES
[facility]ist Progress Note    Date of Service: 2018    Chief Complaint  53 y.o. male with PAF on Xarelto, DM 2, HTN, HLD and GERD admitted 2018 with heart palpitations and chest pain.  Stress test in January was negative     Interval Problem Update  PT went into work with patient early this morning and found him incontinent of urine and stool.  He was unaware he had been incontinent  Improved ambulation today with FWW  Left knee less swollen -x-rays unremarkable  Home health order placed    7/10 - Troponin negative, negative PE, negative DVT  Plan was to DC home today, however he sustained a fall - Head CT negative  Left knee swollen, warm and tender -x-ray pending - RICE  PT eval post fall -patient not safe for DC -will re-eval in the morning  A. fib now with controlled rate    Consultants/Specialty  NA    Disposition  Undetermined at this time.         Review of Systems   Constitutional: Negative for malaise/fatigue.   HENT: Negative for ear discharge, hearing loss and tinnitus.    Eyes: Negative for blurred vision and double vision.   Respiratory: Negative for shortness of breath and wheezing.    Cardiovascular: Negative for chest pain and palpitations.   Gastrointestinal: Negative for abdominal pain, constipation, diarrhea, heartburn, nausea and vomiting.        Unaware of incontinence this morning     Genitourinary: Negative.         Unaware of incontinence this morning   Musculoskeletal: Positive for falls and joint pain (left knee).   Neurological: Negative for tingling, sensory change, focal weakness, weakness and headaches.   Psychiatric/Behavioral: Negative for memory loss. The patient is not nervous/anxious.       Physical Exam  Laboratory/Imaging   Hemodynamics  Temp (24hrs), Av.5 °C (97.7 °F), Min:36 °C (96.8 °F), Max:37 °C (98.6 °F)   Temperature: 36.4 °C (97.6 °F)  Pulse  Av.1  Min: 70  Max: 111    Blood Pressure: 119/77      Respiratory      Respiration: 18, Pulse Oximetry: 96  %        RUL Breath Sounds: Clear, RML Breath Sounds: Clear, RLL Breath Sounds: Diminished, ELIAS Breath Sounds: Clear, LLL Breath Sounds: Diminished    Fluids    Intake/Output Summary (Last 24 hours) at 07/11/18 1210  Last data filed at 07/11/18 0650   Gross per 24 hour   Intake                0 ml   Output             1200 ml   Net            -1200 ml       Nutrition  Orders Placed This Encounter   Procedures   • Diet Order Cardiac     Standing Status:   Standing     Number of Occurrences:   1     Order Specific Question:   Diet:     Answer:   Cardiac [6]     Physical Exam   Constitutional: He is oriented to person, place, and time. Vital signs are normal. He appears well-developed. He is cooperative. He appears ill. No distress.   HENT:   Head: Normocephalic.   Right Ear: Hearing normal.   Left Ear: Hearing normal.   Nose: Nose normal.   Mouth/Throat: Oropharynx is clear and moist and mucous membranes are normal.   Eyes: Conjunctivae and lids are normal. Pupils are equal, round, and reactive to light. No scleral icterus.   Neck: Phonation normal. No JVD present.   Cardiovascular: Intact distal pulses.  An irregularly irregular rhythm present.   BLE 2+ ankle/pedal edema  Cap refill WNL   Pulmonary/Chest: Effort normal and breath sounds normal. He has no wheezes. He has no rales.   Abdominal: Soft. Normal appearance and bowel sounds are normal. He exhibits no distension. There is no tenderness. There is no rebound.   Musculoskeletal: He exhibits edema.   Decreased ROM left knee with swelling, warmth, and tenderness s/p fall   Neurological: He is alert and oriented to person, place, and time.   Skin: Skin is warm and dry. No rash noted.   BLE venous stasis changes   Psychiatric: He has a normal mood and affect. His speech is normal. Judgment normal. Cognition and memory are normal.   Nursing note and vitals reviewed.      Recent Labs      07/09/18   1030  07/10/18   0230   WBC  9.3  9.6   RBC  5.27  5.10   HEMOGLOBIN   14.5  14.0   HEMATOCRIT  45.0  43.2   MCV  85.4  84.7   MCH  27.5  27.5   MCHC  32.2*  32.4*   RDW  43.7  43.0   PLATELETCT  194  204   MPV  11.4  11.5     Recent Labs      07/09/18   1030  07/10/18   0230   SODIUM  135  136   POTASSIUM  4.2  3.8   CHLORIDE  102  102   CO2  27  28   GLUCOSE  169*  130*   BUN  24*  22   CREATININE  0.86  0.82   CALCIUM  9.6  8.9     Recent Labs      07/09/18   1030   APTT  30.9   INR  1.11     Recent Labs      07/09/18   1030   BNPBTYPENAT  147*     Recent Labs      07/10/18   0230   TRIGLYCERIDE  136   HDL  24*   LDL  68          Assessment/Plan     * Fall   Assessment & Plan    Fall yesterday prior to DC  Imaging all negative  PT eval today reports better ambulation - will require walker - FWW ordered  HH referral placed  Fall Precautions  Bed Alarm        Atypical chest pain- (present on admission)   Assessment & Plan    Stress test in January was negative  Echo showed reduced EF from previous study 6 months ago -EF now 40% down from 60%  ACE and BB  Troponins negative  EKG shows his atrial fibrillation without any signs of ischemia  ASA and statin  OP Cardiology FU            Paroxysmal A-fib (HCC)- (present on admission)   Assessment & Plan    Followed by Dr. Sanchez- Cardiology - as OP  Currently rate controlled  Continue beta-blocker  Continue Xarelto            Acute conjunctivitis   Assessment & Plan    Both eyes blood-shot this morning  Patient has been incontinent of urine and stool and perhaps has contaminated himself  ABX eyedrops  monitor        Acquired circulating anticoagulants (HCC)- (present on admission)   Assessment & Plan    Continue Xarelto for PAF        HTN (hypertension)- (present on admission)   Assessment & Plan    Chronic  120/76 this morning  Continue home medications        HLD (hyperlipidemia)- (present on admission)   Assessment & Plan    Chronic  Continue home statin therapy        GERD (gastroesophageal reflux disease)- (present on admission)    Assessment & Plan    Chronic  Continue home Pepcid        Type 2 diabetes mellitus (HCC)- (present on admission)   Assessment & Plan    Controlled  Holding metformin  Accu-Cheks before meals and at bedtime with SSI          Quality-Core Measures   Reviewed items::  Labs reviewed, Medications reviewed and Radiology images reviewed  Zabala catheter::  No Zabala  DVT: Xarelto.  DVT prophylaxis - mechanical:  Not indicated at this time, ambulatory  Ulcer Prophylaxis::  Yes  Assessed for rehabilitation services:  Patient was assess for and/or received rehabilitation services during this hospitalization      CRYSTAL Ruano.

## 2018-07-11 NOTE — PROGRESS NOTES
Assumed care of patient at 1900. Initial assessment completed. Patient is A&Ox4 and able to make needs known. Pt c/o CP 4/10 of same type; declined medication. Patient c/o L knee pain, ice pack applied. Patient denies SOB. POC discussed with pt: PT/OT eval, safety. No further questions at this time. Fall precautions in place. Bed alarm on, bracelet on, bed locked and in the lowest position, call light instructions provided, call light within reach.

## 2018-07-11 NOTE — PROGRESS NOTES
Assumed care of pt at 1100. Received report from RN. Pt A&Ox4. Assessment complete. Labs reviewed. Pt c/o pain in left knee from fall on previous unit, pt left leg elevated on pillow and ice applied. Pt pannus red with yeast smell, nystatin on order, interdry to be ordered. Pt use of bedside urinal, per OT, pt hypotensive when standing up this AM. Pt a one person assist with use of FWW to bedside commode. Pt PIV SL. Pt and RN discussed plan of care. Pt questions answered. Pt needs are met at this time. Bed in lowest and locked position. Call light within reach. Upper bed rails up. Hourly rounding in place.

## 2018-07-11 NOTE — DISCHARGE PLANNING
Received Choice form at 0975  Agency/Facility Name: [facility]  Referral sent per Choice form @ 9870

## 2018-07-11 NOTE — CARE PLAN
Problem: Safety  Goal: Will remain free from injury  Outcome: PROGRESSING SLOWER THAN EXPECTED  Pt fell during this hospital visit. Pt bed in lowest and locked position, pt calls appropriately, fall risk precautions in place.     Problem: Mobility  Goal: Risk for activity intolerance will decrease  Outcome: PROGRESSING AS EXPECTED  Pt up to bathroom one person assist with use of FWW.

## 2018-07-11 NOTE — FACE TO FACE
Face to Face Supporting Documentation - Home Health    The encounter with this patient was in whole or in part the primary reason for home health admission.    Date of encounter:   Patient:                    MRN:                       YOB: 2020  Sebastián Castro  [id number]  1965     Home health to see patient for:  Skilled Nursing care for assessment, interventions & education, Physical Therapy evaluation and treatment and Occupational therapy evaluation and treatment    Skilled need for:  Recent Deterioration of Health Status Recent Fall, Poor insight on self-care    Skilled nursing interventions to include:  Comment: Assessment and medication management    Homebound status evidenced by:  Need the aid of supportive devices such as crutches, canes, wheelchairs or walkers. Leaving home requires a considerable and taxing effort. There is a normal inability to leave the home.    Community Physician to provide follow up care: Jerad Lomax M.D.     Optional Interventions? No      I certify the face to face encounter for this home health care referral meets the CMS requirements and the encounter/clinical assessment with the patient was, in whole, or in part, for the medical condition(s) listed above, which is the primary reason for home health care. Based on my clinical findings: the service(s) are medically necessary, support the need for home health care, and the homebound criteria are met.  I certify that this patient has had a face to face encounter by myself.  CRYSTAL Ruano. - NPI: [id number]

## 2018-07-11 NOTE — THERAPY
"Physical Therapy Treatment completed.   Bed Mobility:  Supine to Sit: Supervised  Transfers: Sit to Stand: Stand by Assist  Gait: Level Of Assist: Stand by Assist with Front-Wheel Walker       Plan of Care: Will benefit from Physical Therapy 3 times per week and Plan to complete next treatment by Friday 7/13  Discharge Recommendations: Equipment: Wheelchair. Post-acute therapy Discharge to home with outpatient or home health for additional skilled therapy services.    PT is demonstrating improvements in functional mobility and strength today. Pt performed bed mobility at SPV level. Sit to stand at SBA level and ambulated short distance with CGA to SBA level. Pt demonstrated a few unexpected and intermittent bouts of L LE shaking, pt reports \"It wants to buckle.\" This occurred during terminal stance phase and would be more expected during midstance phase when full weight is through L LE. Pt demonstrated adequate UE strength to help offload L LE throughout gait cycle. From a mobility standpoint, pt is functionally capable of DC home and ambulating short distances, however, from a self care standpoint, pt has been incontinent of both urine and stool, stating that he was unaware and unsure if pt will be able to care for self at home. Recommend home health and WC for DC if pt is not able to ambulate long distances     See \"Rehab Therapy-Acute\" Patient Summary Report for complete documentation.       "

## 2018-07-11 NOTE — CARE PLAN
Problem: Respiratory:  Goal: Respiratory status will improve    Intervention: Educate and encourage coughing and deep breathing  Educated patient re: risk for pleural complications r/t decreased lung expansion while in bed. Instructed deep breathing.      Problem: Bowel/Gastric:  Goal: Normal bowel function is maintained or improved  Outcome: PROGRESSING AS EXPECTED  Pt had 1 large bowel movement followed by another smaller BM.

## 2018-07-11 NOTE — PROGRESS NOTES
[facility]ist Progress Note    Date of Service: 7/10/2018    Chief Complaint  53 y.o. male with PAF on Xarelto, DM 2, HTN, HLD and GERD admitted 2018 with heart palpitations and chest pain.  Stress test in January was negative    Interval Problem Update  Troponin negative, negative PE, negative DVT  Plan was to DC home today, however he sustained a fall - Head CT negative  Left knee swollen, warm and tender -x-ray pending - RICE  PT eval post fall -patient not safe for DC -will re-eval in the morning  A. fib now with controlled rate    Consultants/Specialty  NA    Disposition  Undetermined at this time.        Review of Systems   Constitutional: Negative for malaise/fatigue.   HENT: Negative for ear discharge, hearing loss and tinnitus.    Eyes: Negative for blurred vision and double vision.   Respiratory: Negative for shortness of breath and wheezing.    Cardiovascular: Negative for chest pain and palpitations.   Gastrointestinal: Negative for abdominal pain, constipation, diarrhea, heartburn, nausea and vomiting.   Genitourinary: Negative.    Musculoskeletal: Positive for falls and joint pain (left knee).   Neurological: Negative for tingling, sensory change, focal weakness, weakness and headaches.   Psychiatric/Behavioral: Negative for memory loss. The patient is not nervous/anxious.       Physical Exam  Laboratory/Imaging   Hemodynamics  Temp (24hrs), Av.7 °C (98 °F), Min:36.4 °C (97.6 °F), Max:36.9 °C (98.4 °F)   Temperature: 36.7 °C (98.1 °F)  Pulse  Av.5  Min: 71  Max: 111    Blood Pressure: 112/64      Respiratory      Respiration: 16, Pulse Oximetry: 94 %        RUL Breath Sounds: Clear, RML Breath Sounds: Clear, RLL Breath Sounds: Diminished, ELIAS Breath Sounds: Clear, LLL Breath Sounds: Diminished    Fluids    Intake/Output Summary (Last 24 hours) at 07/10/18 1711  Last data filed at 07/10/18 0248   Gross per 24 hour   Intake                0 ml   Output              750 ml   Net              -750 ml       Nutrition  Orders Placed This Encounter   Procedures   • Diet Order Cardiac     Standing Status:   Standing     Number of Occurrences:   1     Order Specific Question:   Diet:     Answer:   Cardiac [6]     Physical Exam   Constitutional: He is oriented to person, place, and time. Vital signs are normal. He appears well-developed. He is cooperative. He appears ill. No distress.   HENT:   Head: Normocephalic.   Right Ear: Hearing normal.   Left Ear: Hearing normal.   Nose: Nose normal.   Mouth/Throat: Oropharynx is clear and moist and mucous membranes are normal.   Eyes: Conjunctivae and lids are normal. Pupils are equal, round, and reactive to light. No scleral icterus.   Neck: Phonation normal. No JVD present.   Cardiovascular: Intact distal pulses.  An irregularly irregular rhythm present.   BLE 2+ ankle/pedal edema  Cap refill WNL   Pulmonary/Chest: Effort normal and breath sounds normal. He has no wheezes. He has no rales.   Abdominal: Soft. Normal appearance and bowel sounds are normal. He exhibits no distension. There is no tenderness. There is no rebound.   Musculoskeletal:   Decreased ROM left knee with swelling, warmth, and tenderness s/p fall   Neurological: He is alert and oriented to person, place, and time.   Skin: Skin is warm and dry. No rash noted.   Psychiatric: He has a normal mood and affect. His speech is normal. Judgment normal. Cognition and memory are normal.   Nursing note and vitals reviewed.      Recent Labs      07/09/18   1030  07/10/18   0230   WBC  9.3  9.6   RBC  5.27  5.10   HEMOGLOBIN  14.5  14.0   HEMATOCRIT  45.0  43.2   MCV  85.4  84.7   MCH  27.5  27.5   MCHC  32.2*  32.4*   RDW  43.7  43.0   PLATELETCT  194  204   MPV  11.4  11.5     Recent Labs      07/09/18   1030  07/10/18   0230   SODIUM  135  136   POTASSIUM  4.2  3.8   CHLORIDE  102  102   CO2  27  28   GLUCOSE  169*  130*   BUN  24*  22   CREATININE  0.86  0.82   CALCIUM  9.6  8.9     Recent Labs       07/09/18   1030   APTT  30.9   INR  1.11     Recent Labs      07/09/18   1030   BNPBTYPENAT  147*     Recent Labs      07/10/18   0230   TRIGLYCERIDE  136   HDL  24*   LDL  68          Assessment/Plan     * Fall   Assessment & Plan    Plan was to DC home this morning, however as he was getting himself from the chair to the bed he got his foot caught up in the bed linens.  He was found by nursing staff on the ground.  Head CT no acute findings  Left knee is swollen, warm and tender -x-ray pending  PT/OT eval state patient is not safe for DC and will reevaluate tomorrow  Fall precautions  Bed alarm        Atypical chest pain- (present on admission)   Assessment & Plan    Stress test in January was negative  Echo showed reduced EF from previous study 6 months ago -EF now 40% down from 60%  ACE and BB  Troponins negative  EKG shows his atrial fibrillation without any signs of ischemia  ASA and statin            Paroxysmal A-fib (HCC)- (present on admission)   Assessment & Plan    Followed by Dr. Sanchez- Cardiology - as OP  Currently rate controlled  Continue beta-blocker  Continue Xarelto  Chemistry          Acquired circulating anticoagulants (HCC)- (present on admission)   Assessment & Plan    Continue Xarelto for PAF        HTN (hypertension)- (present on admission)   Assessment & Plan    Chronic  120/76 this morning  Continue home medications        HLD (hyperlipidemia)- (present on admission)   Assessment & Plan    Chronic  Continue home statin therapy        GERD (gastroesophageal reflux disease)- (present on admission)   Assessment & Plan    Chronic  Continue home Pepcid        Type 2 diabetes mellitus (HCC)- (present on admission)   Assessment & Plan    Controlled  Holding metformin  Accu-Cheks before meals and at bedtime with SSI          Quality-Core Measures   Reviewed items::  EKG reviewed, Labs reviewed, Medications reviewed and Radiology images reviewed  Zabala catheter::  No Zabala  DVT: Xarelto.  DVT  prophylaxis - mechanical:  Not indicated at this time, ambulatory  Ulcer Prophylaxis::  Yes  Assessed for rehabilitation services:  Patient was assess for and/or received rehabilitation services during this hospitalization      CRYSTAL Ruano.

## 2018-07-12 LAB
EKG IMPRESSION: NORMAL
TROPONIN I SERPL-MCNC: <0.01 NG/ML (ref 0–0.04)

## 2018-07-12 PROCEDURE — 93010 ELECTROCARDIOGRAM REPORT: CPT | Performed by: INTERNAL MEDICINE

## 2018-07-12 PROCEDURE — 700111 HCHG RX REV CODE 636 W/ 250 OVERRIDE (IP): Performed by: INTERNAL MEDICINE

## 2018-07-12 PROCEDURE — 93005 ELECTROCARDIOGRAM TRACING: CPT | Performed by: FAMILY MEDICINE

## 2018-07-12 PROCEDURE — 700102 HCHG RX REV CODE 250 W/ 637 OVERRIDE(OP): Performed by: INTERNAL MEDICINE

## 2018-07-12 PROCEDURE — 84484 ASSAY OF TROPONIN QUANT: CPT

## 2018-07-12 PROCEDURE — 36415 COLL VENOUS BLD VENIPUNCTURE: CPT

## 2018-07-12 PROCEDURE — 99225 PR SUBSEQUENT OBSERVATION CARE,LEVEL II: CPT | Performed by: FAMILY MEDICINE

## 2018-07-12 PROCEDURE — G0378 HOSPITAL OBSERVATION PER HR: HCPCS

## 2018-07-12 PROCEDURE — A9270 NON-COVERED ITEM OR SERVICE: HCPCS | Performed by: INTERNAL MEDICINE

## 2018-07-12 RX ADMIN — ASPIRIN 325 MG: 325 TABLET ORAL at 05:49

## 2018-07-12 RX ADMIN — CIPROFLOXACIN HYDROCHLORIDE 1 DROP: 3 SOLUTION/ DROPS OPHTHALMIC at 21:05

## 2018-07-12 RX ADMIN — PRAVASTATIN SODIUM 20 MG: 20 TABLET ORAL at 17:47

## 2018-07-12 RX ADMIN — LISINOPRIL 40 MG: 20 TABLET ORAL at 05:45

## 2018-07-12 RX ADMIN — CIPROFLOXACIN HYDROCHLORIDE 1 DROP: 3 SOLUTION/ DROPS OPHTHALMIC at 09:22

## 2018-07-12 RX ADMIN — FAMOTIDINE 20 MG: 20 TABLET ORAL at 05:50

## 2018-07-12 RX ADMIN — RIVAROXABAN 20 MG: 20 TABLET, FILM COATED ORAL at 05:46

## 2018-07-12 RX ADMIN — CIPROFLOXACIN HYDROCHLORIDE 1 DROP: 3 SOLUTION/ DROPS OPHTHALMIC at 14:22

## 2018-07-12 RX ADMIN — CIPROFLOXACIN HYDROCHLORIDE 1 DROP: 3 SOLUTION/ DROPS OPHTHALMIC at 16:19

## 2018-07-12 RX ADMIN — METOPROLOL TARTRATE 100 MG: 50 TABLET ORAL at 17:47

## 2018-07-12 RX ADMIN — FAMOTIDINE 20 MG: 20 TABLET ORAL at 17:47

## 2018-07-12 RX ADMIN — CIPROFLOXACIN HYDROCHLORIDE 1 DROP: 3 SOLUTION/ DROPS OPHTHALMIC at 11:59

## 2018-07-12 RX ADMIN — FUROSEMIDE 20 MG: 10 INJECTION, SOLUTION INTRAMUSCULAR; INTRAVENOUS at 05:41

## 2018-07-12 RX ADMIN — METOPROLOL TARTRATE 100 MG: 50 TABLET ORAL at 05:43

## 2018-07-12 RX ADMIN — HYDROCHLOROTHIAZIDE 25 MG: 25 TABLET ORAL at 05:51

## 2018-07-12 RX ADMIN — CIPROFLOXACIN HYDROCHLORIDE 1 DROP: 3 SOLUTION/ DROPS OPHTHALMIC at 07:51

## 2018-07-12 RX ADMIN — CIPROFLOXACIN HYDROCHLORIDE 1 DROP: 3 SOLUTION/ DROPS OPHTHALMIC at 17:47

## 2018-07-12 ASSESSMENT — ENCOUNTER SYMPTOMS
VOMITING: 0
BACK PAIN: 0
NECK PAIN: 0
MYALGIAS: 0
BLURRED VISION: 0
DIZZINESS: 0
NAUSEA: 0
FEVER: 0
CLAUDICATION: 0
SPUTUM PRODUCTION: 0
HEADACHES: 0
CHILLS: 0
WEIGHT LOSS: 0
DOUBLE VISION: 0
TINGLING: 0
PALPITATIONS: 0
HEARTBURN: 0
PHOTOPHOBIA: 0
COUGH: 0
HEMOPTYSIS: 0

## 2018-07-12 ASSESSMENT — PAIN SCALES - GENERAL
PAINLEVEL_OUTOF10: 0
PAINLEVEL_OUTOF10: 0

## 2018-07-12 NOTE — PROGRESS NOTES
Assumed care of pt at 1900. Received report from ELISA Alcantar. Pt A&Ox4. Assessment complete. Labs reviewed. Pt c/o pain in left knee from fall on previous unit, medicated per MAR for pain. Pt pannus red and B/L-LE dry. Pt uses bedside urinal, per OT, pt hypotensive 107/48. Pt a one person assist with use of FWW to bedside commode. Pt Right PIV SL, Left PIV infiltrated, removed. Pt and RN discussed plan of care. Pt questions answered. Pt needs are met at this time. Bed in lowest and locked position. Call light within reach. Upper bed rails up. Hourly rounding in place.

## 2018-07-12 NOTE — PROGRESS NOTES
Pt c/o chest pain 8/10 radiating to left side. VSS. Dr Zelaya notified at 1155. Awaiting new orders.

## 2018-07-12 NOTE — CARE PLAN
Problem: Safety  Goal: Will remain free from injury  Outcome: PROGRESSING AS EXPECTED  Bed in low, bed alarm in place, call light in reach, and hourly rounding      Problem: Knowledge Deficit  Goal: Knowledge of the prescribed therapeutic regimen will improve  Outcome: PROGRESSING AS EXPECTED

## 2018-07-12 NOTE — CARE PLAN
Problem: Safety  Goal: Will remain free from falls    Intervention: Implement fall precautions  Fall precautions in place.      Problem: Infection  Goal: Will remain free from infection    Intervention: Assess signs and symptoms of infection  Patient assessed for s/s if infection, infiltrated IV removed.

## 2018-07-12 NOTE — PROGRESS NOTES
[facility]    Date of Service: 2018    Chief Complaint  53 y.o. male admitted 2018 with atypical chest pain    Interval Problem Update  dizziness    Consultants/Specialty  none    Disposition  pending        Review of Systems   Constitutional: Negative for chills, fever and weight loss.   HENT: Negative for ear pain, hearing loss and tinnitus.    Eyes: Negative for blurred vision, double vision and photophobia.   Respiratory: Negative for cough, hemoptysis and sputum production.    Cardiovascular: Positive for chest pain (with taking a deep breath). Negative for palpitations and claudication.   Gastrointestinal: Negative for heartburn, nausea and vomiting.   Genitourinary: Negative for dysuria, frequency and urgency.   Musculoskeletal: Negative for back pain, myalgias and neck pain.   Skin: Positive for rash (both legs/chronic). Negative for itching.   Neurological: Negative for dizziness, tingling and headaches.      Physical Exam  Laboratory/Imaging   Hemodynamics  Temp (24hrs), Av.2 °C (97.1 °F), Min:36.1 °C (96.9 °F), Max:36.3 °C (97.3 °F)   Temperature: 36.2 °C (97.2 °F)  Pulse  Av.5  Min: 69  Max: 111    Blood Pressure: 121/69      Respiratory      Respiration: 16, Pulse Oximetry: 99 %        RUL Breath Sounds: Clear, RML Breath Sounds: Clear, RLL Breath Sounds: Clear, ELIAS Breath Sounds: Clear, LLL Breath Sounds: Clear    Fluids    Intake/Output Summary (Last 24 hours) at 18 1221  Last data filed at 18 1000   Gross per 24 hour   Intake             1100 ml   Output             2525 ml   Net            -1425 ml       Nutrition  Orders Placed This Encounter   Procedures   • Diet Order Cardiac     Standing Status:   Standing     Number of Occurrences:   1     Order Specific Question:   Diet:     Answer:   Cardiac [6]     Physical Exam   Constitutional: He is oriented to person, place, and time. No distress.   HENT:   Head: Normocephalic and atraumatic.   Eyes:  Conjunctivae are normal. Pupils are equal, round, and reactive to light.   Neck: Normal range of motion.   Cardiovascular: Normal rate.    Pulmonary/Chest: Effort normal and breath sounds normal.   Abdominal: Soft. Bowel sounds are normal.   Musculoskeletal: He exhibits edema (chronic in both legs).   Neurological: He is alert and oriented to person, place, and time.   Skin: Skin is warm and dry. Rash (in the legs/chronic) noted. He is not diaphoretic.       Recent Labs      07/10/18   0230  07/11/18   1302   WBC  9.6  11.0*   RBC  5.10  5.53   HEMOGLOBIN  14.0  15.5   HEMATOCRIT  43.2  47.0   MCV  84.7  85.0   MCH  27.5  28.0   MCHC  32.4*  33.0*   RDW  43.0  42.6   PLATELETCT  204  216   MPV  11.5  11.3     Recent Labs      07/10/18   0230  07/11/18   1302   SODIUM  136  134*   POTASSIUM  3.8  3.9   CHLORIDE  102  96   CO2  28  32   GLUCOSE  130*  171*   BUN  22  28*   CREATININE  0.82  1.11   CALCIUM  8.9  9.5             Recent Labs      07/10/18   0230   TRIGLYCERIDE  136   HDL  24*   LDL  68          Assessment/Plan     * Fall   Assessment & Plan      Imaging all negative  PT eval today reports better ambulation - will require walker - FWW ordered  HH referral placed  Fall Precautions  Bed Alarm        Atypical chest pain- (present on admission)   Assessment & Plan    He complainsStress test in January was negative  Echo showed reduced EF from previous study 6 months ago -EF now 40% down from 60%  ACE and BB  Troponins negative  EKG shows his atrial fibrillation without any signs of ischemia  ASA and statin  OP Cardiology FU            Paroxysmal A-fib (HCC)- (present on admission)   Assessment & Plan    Followed by Dr. Sanchez- Cardiology - as OP  Currently rate controlled  lopressor  Continue Xarelto            Acute conjunctivitis   Assessment & Plan    Both eyes blood-shot this morning  Patient has been incontinent of urine and stool and perhaps has contaminated himself  ABX eyedrops  monitor         Acquired circulating anticoagulants (HCC)- (present on admission)   Assessment & Plan    Continue Xarelto for PAF        HTN (hypertension)- (present on admission)   Assessment & Plan    Lopressor  hctz  dced lasix and lisinopril due to hypotension          HLD (hyperlipidemia)- (present on admission)   Assessment & Plan    Chronic  Continue home statin therapy        GERD (gastroesophageal reflux disease)- (present on admission)   Assessment & Plan    Chronic  Continue home Pepcid        Type 2 diabetes mellitus (HCC)- (present on admission)   Assessment & Plan    Controlled  Holding metformin  Accu-Cheks before meals and at bedtime with SSI  hema1c 6.5          Quality-Core Measures   Zabala catheter::  No Zabala  DVT prophylaxis pharmacological::  Warfarin (Coumadin)

## 2018-07-13 PROBLEM — R42 DIZZINESS: Status: ACTIVE | Noted: 2018-07-13

## 2018-07-13 LAB
ALBUMIN SERPL BCP-MCNC: 4.1 G/DL (ref 3.2–4.9)
ALBUMIN/GLOB SERPL: 1.1 G/DL
ALP SERPL-CCNC: 86 U/L (ref 30–99)
ALT SERPL-CCNC: 25 U/L (ref 2–50)
ANION GAP SERPL CALC-SCNC: 12 MMOL/L (ref 0–11.9)
AST SERPL-CCNC: 24 U/L (ref 12–45)
BASOPHILS # BLD AUTO: 0.6 % (ref 0–1.8)
BASOPHILS # BLD: 0.05 K/UL (ref 0–0.12)
BILIRUB SERPL-MCNC: 1.1 MG/DL (ref 0.1–1.5)
BUN SERPL-MCNC: 38 MG/DL (ref 8–22)
CALCIUM SERPL-MCNC: 9.7 MG/DL (ref 8.5–10.5)
CHLORIDE SERPL-SCNC: 97 MMOL/L (ref 96–112)
CO2 SERPL-SCNC: 25 MMOL/L (ref 20–33)
CREAT SERPL-MCNC: 1.01 MG/DL (ref 0.5–1.4)
EOSINOPHIL # BLD AUTO: 0.14 K/UL (ref 0–0.51)
EOSINOPHIL NFR BLD: 1.6 % (ref 0–6.9)
ERYTHROCYTE [DISTWIDTH] IN BLOOD BY AUTOMATED COUNT: 41 FL (ref 35.9–50)
GLOBULIN SER CALC-MCNC: 3.7 G/DL (ref 1.9–3.5)
GLUCOSE SERPL-MCNC: 127 MG/DL (ref 65–99)
HCT VFR BLD AUTO: 51.7 % (ref 42–52)
HGB BLD-MCNC: 17 G/DL (ref 14–18)
IMM GRANULOCYTES # BLD AUTO: 0.03 K/UL (ref 0–0.11)
IMM GRANULOCYTES NFR BLD AUTO: 0.3 % (ref 0–0.9)
LYMPHOCYTES # BLD AUTO: 1.82 K/UL (ref 1–4.8)
LYMPHOCYTES NFR BLD: 20.8 % (ref 22–41)
MCH RBC QN AUTO: 27.1 PG (ref 27–33)
MCHC RBC AUTO-ENTMCNC: 32.9 G/DL (ref 33.7–35.3)
MCV RBC AUTO: 82.3 FL (ref 81.4–97.8)
MONOCYTES # BLD AUTO: 0.75 K/UL (ref 0–0.85)
MONOCYTES NFR BLD AUTO: 8.6 % (ref 0–13.4)
NEUTROPHILS # BLD AUTO: 5.98 K/UL (ref 1.82–7.42)
NEUTROPHILS NFR BLD: 68.1 % (ref 44–72)
NRBC # BLD AUTO: 0 K/UL
NRBC BLD-RTO: 0 /100 WBC
PLATELET # BLD AUTO: 207 K/UL (ref 164–446)
PMV BLD AUTO: 11.4 FL (ref 9–12.9)
POTASSIUM SERPL-SCNC: 3.9 MMOL/L (ref 3.6–5.5)
PROT SERPL-MCNC: 7.8 G/DL (ref 6–8.2)
RBC # BLD AUTO: 6.28 M/UL (ref 4.7–6.1)
SODIUM SERPL-SCNC: 134 MMOL/L (ref 135–145)
WBC # BLD AUTO: 8.8 K/UL (ref 4.8–10.8)

## 2018-07-13 PROCEDURE — 700102 HCHG RX REV CODE 250 W/ 637 OVERRIDE(OP): Performed by: INTERNAL MEDICINE

## 2018-07-13 PROCEDURE — 85025 COMPLETE CBC W/AUTO DIFF WBC: CPT

## 2018-07-13 PROCEDURE — 80053 COMPREHEN METABOLIC PANEL: CPT

## 2018-07-13 PROCEDURE — 93306 TTE W/DOPPLER COMPLETE: CPT

## 2018-07-13 PROCEDURE — A9270 NON-COVERED ITEM OR SERVICE: HCPCS | Performed by: INTERNAL MEDICINE

## 2018-07-13 PROCEDURE — 99232 SBSQ HOSP IP/OBS MODERATE 35: CPT | Performed by: FAMILY MEDICINE

## 2018-07-13 PROCEDURE — 700102 HCHG RX REV CODE 250 W/ 637 OVERRIDE(OP): Performed by: FAMILY MEDICINE

## 2018-07-13 PROCEDURE — 770020 HCHG ROOM/CARE - TELE (206)

## 2018-07-13 PROCEDURE — 36415 COLL VENOUS BLD VENIPUNCTURE: CPT

## 2018-07-13 PROCEDURE — A9270 NON-COVERED ITEM OR SERVICE: HCPCS | Performed by: FAMILY MEDICINE

## 2018-07-13 RX ORDER — MIDODRINE HYDROCHLORIDE 5 MG/1
2.5 TABLET ORAL
Status: DISCONTINUED | OUTPATIENT
Start: 2018-07-13 | End: 2018-07-17 | Stop reason: HOSPADM

## 2018-07-13 RX ADMIN — CIPROFLOXACIN HYDROCHLORIDE 1 DROP: 3 SOLUTION/ DROPS OPHTHALMIC at 10:01

## 2018-07-13 RX ADMIN — PRAVASTATIN SODIUM 20 MG: 20 TABLET ORAL at 18:08

## 2018-07-13 RX ADMIN — METOPROLOL TARTRATE 25 MG: 25 TABLET, FILM COATED ORAL at 18:08

## 2018-07-13 RX ADMIN — MIDODRINE HYDROCHLORIDE 2.5 MG: 5 TABLET ORAL at 18:07

## 2018-07-13 RX ADMIN — ASPIRIN 325 MG: 325 TABLET ORAL at 05:39

## 2018-07-13 RX ADMIN — RIVAROXABAN 20 MG: 20 TABLET, FILM COATED ORAL at 05:39

## 2018-07-13 RX ADMIN — CIPROFLOXACIN HYDROCHLORIDE 1 DROP: 3 SOLUTION/ DROPS OPHTHALMIC at 18:08

## 2018-07-13 RX ADMIN — MIDODRINE HYDROCHLORIDE 2.5 MG: 5 TABLET ORAL at 13:01

## 2018-07-13 RX ADMIN — HYDROCHLOROTHIAZIDE 25 MG: 25 TABLET ORAL at 05:38

## 2018-07-13 RX ADMIN — ACETAMINOPHEN 650 MG: 325 TABLET, FILM COATED ORAL at 18:07

## 2018-07-13 RX ADMIN — METOPROLOL TARTRATE 100 MG: 50 TABLET ORAL at 05:38

## 2018-07-13 RX ADMIN — CIPROFLOXACIN HYDROCHLORIDE 1 DROP: 3 SOLUTION/ DROPS OPHTHALMIC at 08:08

## 2018-07-13 RX ADMIN — FAMOTIDINE 20 MG: 20 TABLET ORAL at 05:39

## 2018-07-13 RX ADMIN — CIPROFLOXACIN HYDROCHLORIDE 1 DROP: 3 SOLUTION/ DROPS OPHTHALMIC at 05:39

## 2018-07-13 RX ADMIN — CIPROFLOXACIN HYDROCHLORIDE 1 DROP: 3 SOLUTION/ DROPS OPHTHALMIC at 20:00

## 2018-07-13 RX ADMIN — CIPROFLOXACIN HYDROCHLORIDE 1 DROP: 3 SOLUTION/ DROPS OPHTHALMIC at 13:04

## 2018-07-13 RX ADMIN — FAMOTIDINE 20 MG: 20 TABLET ORAL at 18:08

## 2018-07-13 ASSESSMENT — PAIN SCALES - GENERAL
PAINLEVEL_OUTOF10: 5
PAINLEVEL_OUTOF10: 1
PAINLEVEL_OUTOF10: 0
PAINLEVEL_OUTOF10: 0

## 2018-07-13 ASSESSMENT — ENCOUNTER SYMPTOMS
WHEEZING: 0
CLAUDICATION: 0
VOMITING: 0
ABDOMINAL PAIN: 0
HEADACHES: 0
FLANK PAIN: 0
DIARRHEA: 0
DIZZINESS: 1
TINGLING: 0
FALLS: 0
EYE PAIN: 0
EYE DISCHARGE: 0
DIAPHORESIS: 0
WEIGHT LOSS: 0
SHORTNESS OF BREATH: 0
PALPITATIONS: 0
BACK PAIN: 0
SPUTUM PRODUCTION: 0
PHOTOPHOBIA: 0

## 2018-07-13 NOTE — PROGRESS NOTES
Received alert and oriented x 4. Both eyes continued eye drops. Check vitals sign and recorded accordingly and due med given per MAR. Call light within reach. Bed alarm refused. Needs attended. Will continue to monitor./53   Pulse 95   Temp 36.8 °C (98.3 °F)   Resp 17   Ht 1.829 m (6')   Wt 124 kg (273 lb 5.9 oz)   SpO2 96%   BMI 37.08 kg/m² .

## 2018-07-13 NOTE — CARE PLAN
Problem: Safety  Goal: Will remain free from injury    Intervention: Provide assistance with mobility  Encouraged to call for assistance when getting up.      Problem: Discharge Barriers/Planning  Goal: Patient's continuum of care needs will be met    Intervention: Collaborate with Transitional Care Team and Interdisciplinary Team to meet discharge needs  Possible discharge to Home with HH.

## 2018-07-13 NOTE — PROGRESS NOTES
Patient refusing bed alarm, despite education. Charge RN notified. Pt educated to not get up without staff assistance, pt verbalized understanding.

## 2018-07-13 NOTE — CARE PLAN
Problem: Safety  Goal: Will remain free from falls    Intervention: Implement fall precautions  Bed in lowest and locked position, non-skid socks in place, call light in reach, hourly rounding in place.      Problem: Knowledge Deficit  Goal: Knowledge of disease process/condition, treatment plan, diagnostic tests, and medications will improve    Intervention: Explain information regarding disease process/condition, treatment plan, diagnostic tests, and medications and document in education  Patient educated about not getting up without assistance from staff due to low B/P and dizziness.

## 2018-07-13 NOTE — PROGRESS NOTES
[facility]ist Progress Note    Date of Service: 2018    Chief Complaint  53 y.o. male admitted 2018 with atypical chest pain    Interval Problem Update  Dizziness  Transfer to tele    Consultants/Specialty  none    Disposition  pending        Review of Systems   Constitutional: Positive for malaise/fatigue. Negative for diaphoresis and weight loss.   HENT: Negative for ear discharge, ear pain and nosebleeds.    Eyes: Negative for photophobia, pain and discharge.   Respiratory: Negative for sputum production, shortness of breath and wheezing.    Cardiovascular: Negative for chest pain (with taking a deep breath), palpitations and claudication.   Gastrointestinal: Negative for abdominal pain, diarrhea and vomiting.   Genitourinary: Negative for flank pain, frequency and hematuria.   Musculoskeletal: Negative for back pain, falls and joint pain.   Skin: Positive for rash (both legs/chronic). Negative for itching.   Neurological: Positive for dizziness. Negative for tingling and headaches.      Physical Exam  Laboratory/Imaging   Hemodynamics  Temp (24hrs), Av.5 °C (97.7 °F), Min:36.2 °C (97.2 °F), Max:36.8 °C (98.3 °F)   Temperature: 36.5 °C (97.7 °F)  Pulse  Av  Min: 69  Max: 111    Blood Pressure: (!) 92/58      Respiratory      Respiration: 17, Pulse Oximetry: 92 %        RLL Breath Sounds: Clear, LLL Breath Sounds: Clear    Fluids    Intake/Output Summary (Last 24 hours) at 18 1113  Last data filed at 18 1101   Gross per 24 hour   Intake              480 ml   Output              800 ml   Net             -320 ml       Nutrition  Orders Placed This Encounter   Procedures   • Diet Order Cardiac     Standing Status:   Standing     Number of Occurrences:   1     Order Specific Question:   Diet:     Answer:   Cardiac [6]     Physical Exam   Constitutional: He is oriented to person, place, and time. No distress.   HENT:   Right Ear: External ear normal.   Left Ear: External ear normal.    Eyes: Right eye exhibits no discharge. Left eye exhibits no discharge.   Neck: Normal range of motion. No JVD present.   Cardiovascular: Regular rhythm and normal heart sounds.    Pulmonary/Chest: No stridor. No respiratory distress. He has no wheezes. He has no rales.   Abdominal: There is no tenderness. There is no rebound and no guarding.   Musculoskeletal: He exhibits edema (chronic in both legs).   Neurological: He is alert and oriented to person, place, and time.   Skin: Skin is warm and dry. Rash (in the legs/chronic) noted. He is not diaphoretic.       Recent Labs      07/11/18   1302  07/13/18   0329   WBC  11.0*  8.8   RBC  5.53  6.28*   HEMOGLOBIN  15.5  17.0   HEMATOCRIT  47.0  51.7   MCV  85.0  82.3   MCH  28.0  27.1   MCHC  33.0*  32.9*   RDW  42.6  41.0   PLATELETCT  216  207   MPV  11.3  11.4     Recent Labs      07/11/18   1302  07/13/18   0329   SODIUM  134*  134*   POTASSIUM  3.9  3.9   CHLORIDE  96  97   CO2  32  25   GLUCOSE  171*  127*   BUN  28*  38*   CREATININE  1.11  1.01   CALCIUM  9.5  9.7                      Assessment/Plan     * Fall   Assessment & Plan      Imaging all negative  PT eval today reports better ambulation - will require walker - FWW ordered  HH referral placed  Fall Precautions  Bed Alarm        Atypical chest pain- (present on admission)   Assessment & Plan    He complainsStress test in January was negative  With taking a deep breath  Echo showed reduced EF from previous study 6 months ago -EF now 40% down from 60%  ACE and BB  Troponins negative  EKG shows his atrial fibrillation without any signs of ischemia  ASA and statin  OP Cardiology FU            Paroxysmal A-fib (HCC)- (present on admission)   Assessment & Plan    Followed by Dr. Sanchez- Cardiology - as OP  Currently rate controlled  lopressor  Continue Xarelto            Dizziness   Assessment & Plan    As per above  Fall precaution        Acute conjunctivitis   Assessment & Plan    Both eyes blood-shot  this morning  Patient has been incontinent of urine and stool and perhaps has contaminated himself  ABX eyedrops  monitor        Acquired circulating anticoagulants (HCC)- (present on admission)   Assessment & Plan    Continue Xarelto for PAF        HTN (hypertension)- (present on admission)   Assessment & Plan    Pt  Has severe orthostatic hypotension with severe dizziness  Lopressor is decreased  hctz is dced  dced lasix and lisinopril   dced prn vasotec and clonidine  Will add midodrine  Will transfer the pt to tele  Possibly needs cardiology consult          HLD (hyperlipidemia)- (present on admission)   Assessment & Plan    Chronic  Continue home statin therapy        GERD (gastroesophageal reflux disease)- (present on admission)   Assessment & Plan    Chronic  Continue home Pepcid        Type 2 diabetes mellitus (HCC)- (present on admission)   Assessment & Plan    Controlled  Holding metformin  Accu-Cheks before meals and at bedtime with SSI  hema1c 6.5          Quality-Core Measures   Zabala catheter::  No Zabala  DVT prophylaxis pharmacological::  Warfarin (Coumadin)

## 2018-07-14 LAB
GLUCOSE BLD-MCNC: 164 MG/DL (ref 65–99)
GLUCOSE BLD-MCNC: 329 MG/DL (ref 65–99)

## 2018-07-14 PROCEDURE — 99233 SBSQ HOSP IP/OBS HIGH 50: CPT | Performed by: FAMILY MEDICINE

## 2018-07-14 PROCEDURE — 82962 GLUCOSE BLOOD TEST: CPT

## 2018-07-14 PROCEDURE — 770020 HCHG ROOM/CARE - TELE (206)

## 2018-07-14 PROCEDURE — A9270 NON-COVERED ITEM OR SERVICE: HCPCS | Performed by: INTERNAL MEDICINE

## 2018-07-14 PROCEDURE — 93880 EXTRACRANIAL BILAT STUDY: CPT | Mod: 26 | Performed by: SURGERY

## 2018-07-14 PROCEDURE — 700102 HCHG RX REV CODE 250 W/ 637 OVERRIDE(OP): Performed by: INTERNAL MEDICINE

## 2018-07-14 PROCEDURE — 93880 EXTRACRANIAL BILAT STUDY: CPT

## 2018-07-14 PROCEDURE — 700102 HCHG RX REV CODE 250 W/ 637 OVERRIDE(OP): Performed by: FAMILY MEDICINE

## 2018-07-14 PROCEDURE — A9270 NON-COVERED ITEM OR SERVICE: HCPCS | Performed by: FAMILY MEDICINE

## 2018-07-14 RX ADMIN — ASPIRIN 324 MG: 81 TABLET, CHEWABLE ORAL at 05:56

## 2018-07-14 RX ADMIN — CIPROFLOXACIN HYDROCHLORIDE 1 DROP: 3 SOLUTION/ DROPS OPHTHALMIC at 16:06

## 2018-07-14 RX ADMIN — FAMOTIDINE 20 MG: 20 TABLET ORAL at 18:07

## 2018-07-14 RX ADMIN — MIDODRINE HYDROCHLORIDE 2.5 MG: 5 TABLET ORAL at 18:07

## 2018-07-14 RX ADMIN — CIPROFLOXACIN HYDROCHLORIDE 1 DROP: 3 SOLUTION/ DROPS OPHTHALMIC at 11:55

## 2018-07-14 RX ADMIN — PRAVASTATIN SODIUM 20 MG: 20 TABLET ORAL at 18:07

## 2018-07-14 RX ADMIN — CIPROFLOXACIN HYDROCHLORIDE 1 DROP: 3 SOLUTION/ DROPS OPHTHALMIC at 21:02

## 2018-07-14 RX ADMIN — MIDODRINE HYDROCHLORIDE 2.5 MG: 5 TABLET ORAL at 07:39

## 2018-07-14 RX ADMIN — CIPROFLOXACIN HYDROCHLORIDE 1 DROP: 3 SOLUTION/ DROPS OPHTHALMIC at 14:06

## 2018-07-14 RX ADMIN — METOPROLOL TARTRATE 25 MG: 25 TABLET, FILM COATED ORAL at 18:07

## 2018-07-14 RX ADMIN — MIDODRINE HYDROCHLORIDE 2.5 MG: 5 TABLET ORAL at 11:55

## 2018-07-14 RX ADMIN — METOPROLOL TARTRATE 25 MG: 25 TABLET, FILM COATED ORAL at 05:57

## 2018-07-14 RX ADMIN — RIVAROXABAN 20 MG: 20 TABLET, FILM COATED ORAL at 07:38

## 2018-07-14 RX ADMIN — CIPROFLOXACIN HYDROCHLORIDE 1 DROP: 3 SOLUTION/ DROPS OPHTHALMIC at 10:18

## 2018-07-14 RX ADMIN — CIPROFLOXACIN HYDROCHLORIDE 1 DROP: 3 SOLUTION/ DROPS OPHTHALMIC at 18:08

## 2018-07-14 RX ADMIN — CIPROFLOXACIN HYDROCHLORIDE 1 DROP: 3 SOLUTION/ DROPS OPHTHALMIC at 05:58

## 2018-07-14 RX ADMIN — FAMOTIDINE 20 MG: 20 TABLET ORAL at 05:57

## 2018-07-14 RX ADMIN — CIPROFLOXACIN HYDROCHLORIDE 1 DROP: 3 SOLUTION/ DROPS OPHTHALMIC at 07:39

## 2018-07-14 ASSESSMENT — ENCOUNTER SYMPTOMS
NAUSEA: 0
DIARRHEA: 0
TINGLING: 0
FLANK PAIN: 0
WEIGHT LOSS: 0
HEMOPTYSIS: 0
FEVER: 0
NECK PAIN: 0
FALLS: 0
DOUBLE VISION: 0
COUGH: 0
HEADACHES: 0
PALPITATIONS: 0
DEPRESSION: 0
PHOTOPHOBIA: 0
DIZZINESS: 1
ORTHOPNEA: 0
SPUTUM PRODUCTION: 0
BACK PAIN: 0
TREMORS: 0
BLURRED VISION: 0
ABDOMINAL PAIN: 0
VOMITING: 0
WHEEZING: 0

## 2018-07-14 ASSESSMENT — PAIN SCALES - GENERAL
PAINLEVEL_OUTOF10: 0
PAINLEVEL_OUTOF10: 1

## 2018-07-14 NOTE — PROGRESS NOTES
Pt has been resting, sleeping and talking on the phone this afternoon, he denies pain, call light and items within reach, bed locked in low position, hourly rounding in place.

## 2018-07-14 NOTE — PROGRESS NOTES
Pt arrived to tele floor, denies pain, alert, awake, VSS. Oriented to tele floor, call light and items within reach, bed locked in low position, bed alarm on. Hourly rounding in place.

## 2018-07-14 NOTE — PROGRESS NOTES
Bedside report received from ELISA Dumont. Initial patient assessment performed, chart and eMAR reviewed. See relevant flowsheet for details. Patient updated on plan of care for the day, with all questions answered. Call light and personal belongings are within reach, and bed is in the lowest position.

## 2018-07-14 NOTE — PROGRESS NOTES
Received Bedside report. Assumed care at 1900. This pt is AOx4, ambulatory with 1 person assist with FWW (though RN has not seen pt get up_, voiding appropriately in urinal, denies pain. Patient and RN discussed plan of care: questions answered. Labs noted, assessment complete, patient tolerating cardiac diet. Tele box in place. Pt is on RA. Call light in place, fall precautions in place, patient educated on importance of calling for assistance. No additional needs at this time. VSS

## 2018-07-14 NOTE — CARE PLAN
Problem: Safety  Goal: Will remain free from falls  Outcome: PROGRESSING AS EXPECTED  Patient's risk for injury and falls assessed. Appropriate safety precautions in place. Patient educated to utilize call light for needs. Patient verbalizes understanding.    Problem: Knowledge Deficit  Goal: Knowledge of disease process/condition, treatment plan, diagnostic tests, and medications will improve  Outcome: PROGRESSING AS EXPECTED  Patient is educated of disease process and condition. Treatment team has included patient in plan of care. All medications indications and side effects are explained. Patient is encouraged to ask questions. Patient indicates understanding.

## 2018-07-15 LAB
ALBUMIN SERPL BCP-MCNC: 3.8 G/DL (ref 3.2–4.9)
ALBUMIN/GLOB SERPL: 1 G/DL
ALP SERPL-CCNC: 84 U/L (ref 30–99)
ALT SERPL-CCNC: 27 U/L (ref 2–50)
ANION GAP SERPL CALC-SCNC: 11 MMOL/L (ref 0–11.9)
AST SERPL-CCNC: 30 U/L (ref 12–45)
BASOPHILS # BLD AUTO: 0.5 % (ref 0–1.8)
BASOPHILS # BLD: 0.05 K/UL (ref 0–0.12)
BILIRUB SERPL-MCNC: 0.8 MG/DL (ref 0.1–1.5)
BUN SERPL-MCNC: 41 MG/DL (ref 8–22)
CALCIUM SERPL-MCNC: 9.8 MG/DL (ref 8.5–10.5)
CHLORIDE SERPL-SCNC: 99 MMOL/L (ref 96–112)
CO2 SERPL-SCNC: 23 MMOL/L (ref 20–33)
CREAT SERPL-MCNC: 0.85 MG/DL (ref 0.5–1.4)
EOSINOPHIL # BLD AUTO: 0.26 K/UL (ref 0–0.51)
EOSINOPHIL NFR BLD: 2.4 % (ref 0–6.9)
ERYTHROCYTE [DISTWIDTH] IN BLOOD BY AUTOMATED COUNT: 41 FL (ref 35.9–50)
GLOBULIN SER CALC-MCNC: 4 G/DL (ref 1.9–3.5)
GLUCOSE BLD-MCNC: 148 MG/DL (ref 65–99)
GLUCOSE BLD-MCNC: 167 MG/DL (ref 65–99)
GLUCOSE BLD-MCNC: 263 MG/DL (ref 65–99)
GLUCOSE SERPL-MCNC: 144 MG/DL (ref 65–99)
HCT VFR BLD AUTO: 51.5 % (ref 42–52)
HGB BLD-MCNC: 17.2 G/DL (ref 14–18)
IMM GRANULOCYTES # BLD AUTO: 0.04 K/UL (ref 0–0.11)
IMM GRANULOCYTES NFR BLD AUTO: 0.4 % (ref 0–0.9)
LYMPHOCYTES # BLD AUTO: 3.03 K/UL (ref 1–4.8)
LYMPHOCYTES NFR BLD: 27.6 % (ref 22–41)
MCH RBC QN AUTO: 27.6 PG (ref 27–33)
MCHC RBC AUTO-ENTMCNC: 33.4 G/DL (ref 33.7–35.3)
MCV RBC AUTO: 82.5 FL (ref 81.4–97.8)
MONOCYTES # BLD AUTO: 1.02 K/UL (ref 0–0.85)
MONOCYTES NFR BLD AUTO: 9.3 % (ref 0–13.4)
NEUTROPHILS # BLD AUTO: 6.56 K/UL (ref 1.82–7.42)
NEUTROPHILS NFR BLD: 59.8 % (ref 44–72)
NRBC # BLD AUTO: 0 K/UL
NRBC BLD-RTO: 0 /100 WBC
PLATELET # BLD AUTO: 233 K/UL (ref 164–446)
PMV BLD AUTO: 11.2 FL (ref 9–12.9)
POTASSIUM SERPL-SCNC: 4.4 MMOL/L (ref 3.6–5.5)
PROT SERPL-MCNC: 7.8 G/DL (ref 6–8.2)
RBC # BLD AUTO: 6.24 M/UL (ref 4.7–6.1)
SODIUM SERPL-SCNC: 133 MMOL/L (ref 135–145)
WBC # BLD AUTO: 11 K/UL (ref 4.8–10.8)

## 2018-07-15 PROCEDURE — 85025 COMPLETE CBC W/AUTO DIFF WBC: CPT

## 2018-07-15 PROCEDURE — 97530 THERAPEUTIC ACTIVITIES: CPT

## 2018-07-15 PROCEDURE — 770020 HCHG ROOM/CARE - TELE (206)

## 2018-07-15 PROCEDURE — 700102 HCHG RX REV CODE 250 W/ 637 OVERRIDE(OP): Performed by: FAMILY MEDICINE

## 2018-07-15 PROCEDURE — 97116 GAIT TRAINING THERAPY: CPT

## 2018-07-15 PROCEDURE — 700102 HCHG RX REV CODE 250 W/ 637 OVERRIDE(OP): Performed by: INTERNAL MEDICINE

## 2018-07-15 PROCEDURE — A9270 NON-COVERED ITEM OR SERVICE: HCPCS | Performed by: INTERNAL MEDICINE

## 2018-07-15 PROCEDURE — 82962 GLUCOSE BLOOD TEST: CPT | Mod: 91

## 2018-07-15 PROCEDURE — 99233 SBSQ HOSP IP/OBS HIGH 50: CPT | Performed by: FAMILY MEDICINE

## 2018-07-15 PROCEDURE — 36415 COLL VENOUS BLD VENIPUNCTURE: CPT

## 2018-07-15 PROCEDURE — A9270 NON-COVERED ITEM OR SERVICE: HCPCS | Performed by: FAMILY MEDICINE

## 2018-07-15 PROCEDURE — 80053 COMPREHEN METABOLIC PANEL: CPT

## 2018-07-15 RX ORDER — DEXTROSE MONOHYDRATE 25 G/50ML
25 INJECTION, SOLUTION INTRAVENOUS
Status: DISCONTINUED | OUTPATIENT
Start: 2018-07-15 | End: 2018-07-17 | Stop reason: HOSPADM

## 2018-07-15 RX ADMIN — FAMOTIDINE 20 MG: 20 TABLET ORAL at 05:24

## 2018-07-15 RX ADMIN — RIVAROXABAN 20 MG: 20 TABLET, FILM COATED ORAL at 17:31

## 2018-07-15 RX ADMIN — CIPROFLOXACIN HYDROCHLORIDE 1 DROP: 3 SOLUTION/ DROPS OPHTHALMIC at 17:35

## 2018-07-15 RX ADMIN — CIPROFLOXACIN HYDROCHLORIDE 1 DROP: 3 SOLUTION/ DROPS OPHTHALMIC at 08:21

## 2018-07-15 RX ADMIN — ASPIRIN 325 MG: 325 TABLET ORAL at 05:23

## 2018-07-15 RX ADMIN — CIPROFLOXACIN HYDROCHLORIDE 1 DROP: 3 SOLUTION/ DROPS OPHTHALMIC at 21:21

## 2018-07-15 RX ADMIN — FAMOTIDINE 20 MG: 20 TABLET ORAL at 17:31

## 2018-07-15 RX ADMIN — STANDARDIZED SENNA CONCENTRATE AND DOCUSATE SODIUM 2 TABLET: 8.6; 5 TABLET, FILM COATED ORAL at 17:31

## 2018-07-15 RX ADMIN — CIPROFLOXACIN HYDROCHLORIDE 1 DROP: 3 SOLUTION/ DROPS OPHTHALMIC at 05:24

## 2018-07-15 RX ADMIN — METOPROLOL TARTRATE 25 MG: 25 TABLET, FILM COATED ORAL at 05:23

## 2018-07-15 RX ADMIN — CIPROFLOXACIN HYDROCHLORIDE 1 DROP: 3 SOLUTION/ DROPS OPHTHALMIC at 16:00

## 2018-07-15 RX ADMIN — CIPROFLOXACIN HYDROCHLORIDE 1 DROP: 3 SOLUTION/ DROPS OPHTHALMIC at 14:37

## 2018-07-15 RX ADMIN — METOPROLOL TARTRATE 25 MG: 25 TABLET, FILM COATED ORAL at 17:31

## 2018-07-15 RX ADMIN — MIDODRINE HYDROCHLORIDE 2.5 MG: 5 TABLET ORAL at 08:26

## 2018-07-15 RX ADMIN — INSULIN HUMAN 1 UNITS: 100 INJECTION, SOLUTION PARENTERAL at 17:32

## 2018-07-15 RX ADMIN — CIPROFLOXACIN HYDROCHLORIDE 1 DROP: 3 SOLUTION/ DROPS OPHTHALMIC at 11:23

## 2018-07-15 RX ADMIN — PRAVASTATIN SODIUM 20 MG: 20 TABLET ORAL at 17:31

## 2018-07-15 RX ADMIN — CIPROFLOXACIN HYDROCHLORIDE 1 DROP: 3 SOLUTION/ DROPS OPHTHALMIC at 10:05

## 2018-07-15 RX ADMIN — MIDODRINE HYDROCHLORIDE 2.5 MG: 5 TABLET ORAL at 17:31

## 2018-07-15 ASSESSMENT — ENCOUNTER SYMPTOMS
TINGLING: 0
PHOTOPHOBIA: 0
HEADACHES: 0
ABDOMINAL PAIN: 0
HEMOPTYSIS: 0
NAUSEA: 0
FEVER: 0
DEPRESSION: 0
COUGH: 0
NECK PAIN: 0
DOUBLE VISION: 0
DIARRHEA: 0
ORTHOPNEA: 0
DIZZINESS: 1
FALLS: 0
WHEEZING: 0
SPUTUM PRODUCTION: 0
CONSTIPATION: 0
TREMORS: 0
WEIGHT LOSS: 0
BACK PAIN: 0
FLANK PAIN: 0
PALPITATIONS: 0
VOMITING: 0
BLURRED VISION: 0

## 2018-07-15 ASSESSMENT — COGNITIVE AND FUNCTIONAL STATUS - GENERAL
SUGGESTED CMS G CODE MODIFIER MOBILITY: CJ
WALKING IN HOSPITAL ROOM: A LITTLE
MOBILITY SCORE: 20
CLIMB 3 TO 5 STEPS WITH RAILING: A LITTLE
MOVING FROM LYING ON BACK TO SITTING ON SIDE OF FLAT BED: A LITTLE
STANDING UP FROM CHAIR USING ARMS: A LITTLE

## 2018-07-15 ASSESSMENT — PAIN SCALES - GENERAL
PAINLEVEL_OUTOF10: 0
PAINLEVEL_OUTOF10: 0

## 2018-07-15 ASSESSMENT — PATIENT HEALTH QUESTIONNAIRE - PHQ9
2. FEELING DOWN, DEPRESSED, IRRITABLE, OR HOPELESS: NOT AT ALL
1. LITTLE INTEREST OR PLEASURE IN DOING THINGS: NOT AT ALL
SUM OF ALL RESPONSES TO PHQ9 QUESTIONS 1 AND 2: 0

## 2018-07-15 ASSESSMENT — GAIT ASSESSMENTS
GAIT LEVEL OF ASSIST: CONTACT GUARD ASSIST
DISTANCE (FEET): 50
ASSISTIVE DEVICE: FRONT WHEEL WALKER

## 2018-07-15 NOTE — PROGRESS NOTES
[facility]ist Progress Note    Date of Service: 2018    Chief Complaint  53 y.o. male admitted 2018 with atypical chest pain    Interval Problem Update  Feels okay this morning.  Complain of occasional pleuritic chest pain with deep inspiration.  No other issues to report.    Consultants/Specialty  none    Disposition  pending        Review of Systems   Constitutional: Positive for malaise/fatigue. Negative for fever and weight loss.   HENT: Negative for ear pain, hearing loss and tinnitus.    Eyes: Negative for blurred vision, double vision and photophobia.   Respiratory: Negative for cough, hemoptysis, sputum production and wheezing.    Cardiovascular: Negative for chest pain (with taking a deep breath), palpitations and orthopnea.   Gastrointestinal: Negative for abdominal pain, diarrhea, nausea and vomiting.   Genitourinary: Negative for dysuria, flank pain, frequency and hematuria.   Musculoskeletal: Negative for back pain, falls, joint pain and neck pain.   Skin: Positive for rash (both legs/chronic). Negative for itching.   Neurological: Positive for dizziness. Negative for tingling, tremors and headaches.   Psychiatric/Behavioral: Negative for depression and suicidal ideas.      Physical Exam  Laboratory/Imaging   Hemodynamics  Temp (24hrs), Av.7 °C (98 °F), Min:36.4 °C (97.5 °F), Max:37.1 °C (98.7 °F)   Temperature: 37.1 °C (98.7 °F)  Pulse  Av.1  Min: 69  Max: 111    Blood Pressure: 101/68      Respiratory      Respiration: 18, Pulse Oximetry: 94 %        RUL Breath Sounds: Clear, RML Breath Sounds: Clear, RLL Breath Sounds: Diminished;Clear, ELIAS Breath Sounds: Clear, LLL Breath Sounds: Diminished;Clear    Fluids    Intake/Output Summary (Last 24 hours) at 18 1706  Last data filed at 18 0410   Gross per 24 hour   Intake              240 ml   Output             1050 ml   Net             -810 ml       Nutrition  Orders Placed This Encounter   Procedures   • Diet Order  Cardiac     Standing Status:   Standing     Number of Occurrences:   1     Order Specific Question:   Diet:     Answer:   Cardiac [6]     Physical Exam   Constitutional: He is oriented to person, place, and time. He appears well-nourished. No distress.   HENT:   Right Ear: External ear normal.   Left Ear: External ear normal.   Mouth/Throat: No oropharyngeal exudate.   Eyes: Right eye exhibits no discharge. Left eye exhibits no discharge.   Neck: Normal range of motion. No JVD present. No tracheal deviation present.   Cardiovascular: Regular rhythm and normal heart sounds.  Exam reveals no friction rub.    Pulmonary/Chest: Effort normal and breath sounds normal. No stridor. No respiratory distress. He has no wheezes.   Abdominal: Bowel sounds are normal. He exhibits no distension. There is no tenderness.   Musculoskeletal: He exhibits edema (chronic in both legs). He exhibits no deformity.   Neurological: He is alert and oriented to person, place, and time.   Skin: Skin is warm and dry. Rash (in the legs/chronic) noted. He is not diaphoretic.   Psychiatric: He has a normal mood and affect.       Recent Labs      07/13/18   0329   WBC  8.8   RBC  6.28*   HEMOGLOBIN  17.0   HEMATOCRIT  51.7   MCV  82.3   MCH  27.1   MCHC  32.9*   RDW  41.0   PLATELETCT  207   MPV  11.4     Recent Labs      07/13/18   0329   SODIUM  134*   POTASSIUM  3.9   CHLORIDE  97   CO2  25   GLUCOSE  127*   BUN  38*   CREATININE  1.01   CALCIUM  9.7                      Assessment/Plan     * Fall   Assessment & Plan    On fall precautions.  CT/OT eval.  Pending placement.        Atypical chest pain- (present on admission)   Assessment & Plan    He complainsStress test in January was negative  With taking a deep breath  Echo showed reduced EF from previous study 6 months ago -EF now 40% down from 60%  ACE and BB  Troponins negative  EKG shows his atrial fibrillation without any signs of ischemia  ASA and statin  OP Cardiology FU             Paroxysmal A-fib (HCC)- (present on admission)   Assessment & Plan    Followed by Dr. Sanchez- Cardiology - as OP  Currently rate controlled  lopressor  Continue Xarelto            Dizziness   Assessment & Plan    Fall precaution        Acute conjunctivitis   Assessment & Plan    ABX eyedrops  monitor        Acquired circulating anticoagulants (HCC)- (present on admission)   Assessment & Plan    Continue Xarelto for PAF        HTN (hypertension)- (present on admission)   Assessment & Plan    Now stable.  Continue to monitor.          HLD (hyperlipidemia)- (present on admission)   Assessment & Plan    Chronic  Continue home statin therapy        GERD (gastroesophageal reflux disease)- (present on admission)   Assessment & Plan    Chronic  Continue home Pepcid        Type 2 diabetes mellitus (HCC)- (present on admission)   Assessment & Plan    Controlled  Holding metformin  Accu-Cheks before meals and at bedtime with SSI  Hem a1c 6.5        Plan of care discussed with multidisciplinary team during rounds.  Quality-Core Measures   Reviewed items::  Labs reviewed, Medications reviewed and Radiology images reviewed  Zabala catheter::  No Zabala  DVT prophylaxis pharmacological::  Warfarin (Coumadin)

## 2018-07-15 NOTE — CARE PLAN
Problem: Safety  Goal: Will remain free from falls  Outcome: PROGRESSING AS EXPECTED    Goal: Will remain free from injury  Outcome: PROGRESSING AS EXPECTED      Problem: Mobility  Goal: Risk for activity intolerance will decrease  Outcome: PROGRESSING AS EXPECTED

## 2018-07-15 NOTE — PROGRESS NOTES
Monitor Summary    Atrial fibrillation 78-87  Rare PVCs  Occasional instances of bradycardia as low as 38

## 2018-07-15 NOTE — PROGRESS NOTES
Received Bedside report. Assumed care at 1900. This pt is AOx4, ambulatory with 1 person assist with FWW (though RN has not seen pt get up), voiding appropriately in urinal, denies pain. Patient and RN discussed plan of care: questions answered. Labs noted, assessment complete, patient tolerating cardiac diet. Tele box in place. Pt is on RA. Call light in place, fall precautions in place, patient educated on importance of calling for assistance. No additional needs at this time. VSS

## 2018-07-15 NOTE — THERAPY
"Pt seen pending DC. FWW adjusted to height. Pt reports of light headedness with scotomas during ambulation with FWW. Required CGA. Returned to room. BP obtained standing 82/62 with reports of light headedness and scotomas. RN and cna present. Recommend transitional care facility PRIOR to DC home. MSW notified    Physical Therapy Treatment completed.   Bed Mobility:  Supine to Sit: Modified Independent  Transfers: Sit to Stand: Stand by Assist  Gait: Level Of Assist: Contact Guard Assist (reports lightheadedenss) with Front-Wheel Walker       Plan of Care: Will benefit from Physical Therapy 3 times per week  Discharge Recommendations: Equipment: No Equipment Needed. Post-acute therapy Discharge to a transitional care facility for continued skilled therapy services.     See \"Rehab Therapy-Acute\" Patient Summary Report for complete documentation.         "

## 2018-07-16 VITALS
SYSTOLIC BLOOD PRESSURE: 119 MMHG | DIASTOLIC BLOOD PRESSURE: 75 MMHG | OXYGEN SATURATION: 92 % | BODY MASS INDEX: 34.67 KG/M2 | TEMPERATURE: 97.9 F | HEART RATE: 96 BPM | WEIGHT: 255.95 LBS | HEIGHT: 72 IN | RESPIRATION RATE: 18 BRPM

## 2018-07-16 LAB
ALBUMIN SERPL BCP-MCNC: 3.9 G/DL (ref 3.2–4.9)
ALBUMIN/GLOB SERPL: 1 G/DL
ALP SERPL-CCNC: 81 U/L (ref 30–99)
ALT SERPL-CCNC: 26 U/L (ref 2–50)
ANION GAP SERPL CALC-SCNC: 10 MMOL/L (ref 0–11.9)
AST SERPL-CCNC: 20 U/L (ref 12–45)
BASOPHILS # BLD AUTO: 0.5 % (ref 0–1.8)
BASOPHILS # BLD: 0.06 K/UL (ref 0–0.12)
BILIRUB SERPL-MCNC: 0.9 MG/DL (ref 0.1–1.5)
BUN SERPL-MCNC: 37 MG/DL (ref 8–22)
CALCIUM SERPL-MCNC: 9.7 MG/DL (ref 8.5–10.5)
CHLORIDE SERPL-SCNC: 98 MMOL/L (ref 96–112)
CO2 SERPL-SCNC: 25 MMOL/L (ref 20–33)
CREAT SERPL-MCNC: 0.85 MG/DL (ref 0.5–1.4)
EOSINOPHIL # BLD AUTO: 0.23 K/UL (ref 0–0.51)
EOSINOPHIL NFR BLD: 1.9 % (ref 0–6.9)
ERYTHROCYTE [DISTWIDTH] IN BLOOD BY AUTOMATED COUNT: 40 FL (ref 35.9–50)
GLOBULIN SER CALC-MCNC: 3.8 G/DL (ref 1.9–3.5)
GLUCOSE BLD-MCNC: 156 MG/DL (ref 65–99)
GLUCOSE BLD-MCNC: 160 MG/DL (ref 65–99)
GLUCOSE BLD-MCNC: 170 MG/DL (ref 65–99)
GLUCOSE BLD-MCNC: 197 MG/DL (ref 65–99)
GLUCOSE SERPL-MCNC: 148 MG/DL (ref 65–99)
HCT VFR BLD AUTO: 53.1 % (ref 42–52)
HGB BLD-MCNC: 17.7 G/DL (ref 14–18)
IMM GRANULOCYTES # BLD AUTO: 0.04 K/UL (ref 0–0.11)
IMM GRANULOCYTES NFR BLD AUTO: 0.3 % (ref 0–0.9)
LYMPHOCYTES # BLD AUTO: 3.23 K/UL (ref 1–4.8)
LYMPHOCYTES NFR BLD: 27.3 % (ref 22–41)
MCH RBC QN AUTO: 27.5 PG (ref 27–33)
MCHC RBC AUTO-ENTMCNC: 33.3 G/DL (ref 33.7–35.3)
MCV RBC AUTO: 82.5 FL (ref 81.4–97.8)
MONOCYTES # BLD AUTO: 1.08 K/UL (ref 0–0.85)
MONOCYTES NFR BLD AUTO: 9.1 % (ref 0–13.4)
NEUTROPHILS # BLD AUTO: 7.2 K/UL (ref 1.82–7.42)
NEUTROPHILS NFR BLD: 60.9 % (ref 44–72)
NRBC # BLD AUTO: 0 K/UL
NRBC BLD-RTO: 0 /100 WBC
PLATELET # BLD AUTO: 226 K/UL (ref 164–446)
PMV BLD AUTO: 11 FL (ref 9–12.9)
POTASSIUM SERPL-SCNC: 3.9 MMOL/L (ref 3.6–5.5)
PROCALCITONIN SERPL-MCNC: 0.12 NG/ML
PROT SERPL-MCNC: 7.7 G/DL (ref 6–8.2)
RBC # BLD AUTO: 6.44 M/UL (ref 4.7–6.1)
SODIUM SERPL-SCNC: 133 MMOL/L (ref 135–145)
WBC # BLD AUTO: 11.8 K/UL (ref 4.8–10.8)

## 2018-07-16 PROCEDURE — 97535 SELF CARE MNGMENT TRAINING: CPT

## 2018-07-16 PROCEDURE — 80053 COMPREHEN METABOLIC PANEL: CPT

## 2018-07-16 PROCEDURE — 99232 SBSQ HOSP IP/OBS MODERATE 35: CPT | Performed by: FAMILY MEDICINE

## 2018-07-16 PROCEDURE — 700102 HCHG RX REV CODE 250 W/ 637 OVERRIDE(OP): Performed by: INTERNAL MEDICINE

## 2018-07-16 PROCEDURE — 700102 HCHG RX REV CODE 250 W/ 637 OVERRIDE(OP): Performed by: FAMILY MEDICINE

## 2018-07-16 PROCEDURE — 85025 COMPLETE CBC W/AUTO DIFF WBC: CPT

## 2018-07-16 PROCEDURE — A9270 NON-COVERED ITEM OR SERVICE: HCPCS | Performed by: INTERNAL MEDICINE

## 2018-07-16 PROCEDURE — 36415 COLL VENOUS BLD VENIPUNCTURE: CPT

## 2018-07-16 PROCEDURE — A9270 NON-COVERED ITEM OR SERVICE: HCPCS | Performed by: FAMILY MEDICINE

## 2018-07-16 PROCEDURE — 84145 PROCALCITONIN (PCT): CPT

## 2018-07-16 PROCEDURE — 82962 GLUCOSE BLOOD TEST: CPT

## 2018-07-16 RX ADMIN — INSULIN HUMAN 1 UNITS: 100 INJECTION, SOLUTION PARENTERAL at 20:59

## 2018-07-16 RX ADMIN — INSULIN HUMAN 1 UNITS: 100 INJECTION, SOLUTION PARENTERAL at 12:33

## 2018-07-16 RX ADMIN — INSULIN HUMAN 1 UNITS: 100 INJECTION, SOLUTION PARENTERAL at 09:09

## 2018-07-16 RX ADMIN — ASPIRIN 325 MG: 325 TABLET ORAL at 05:36

## 2018-07-16 RX ADMIN — FAMOTIDINE 20 MG: 20 TABLET ORAL at 17:43

## 2018-07-16 RX ADMIN — MIDODRINE HYDROCHLORIDE 2.5 MG: 5 TABLET ORAL at 12:28

## 2018-07-16 RX ADMIN — MIDODRINE HYDROCHLORIDE 2.5 MG: 5 TABLET ORAL at 17:42

## 2018-07-16 RX ADMIN — PRAVASTATIN SODIUM 20 MG: 20 TABLET ORAL at 17:42

## 2018-07-16 RX ADMIN — METOPROLOL TARTRATE 25 MG: 25 TABLET, FILM COATED ORAL at 16:39

## 2018-07-16 RX ADMIN — MIDODRINE HYDROCHLORIDE 2.5 MG: 5 TABLET ORAL at 09:09

## 2018-07-16 RX ADMIN — RIVAROXABAN 20 MG: 20 TABLET, FILM COATED ORAL at 17:42

## 2018-07-16 RX ADMIN — FAMOTIDINE 20 MG: 20 TABLET ORAL at 05:36

## 2018-07-16 RX ADMIN — INSULIN HUMAN 1 UNITS: 100 INJECTION, SOLUTION PARENTERAL at 17:48

## 2018-07-16 RX ADMIN — CIPROFLOXACIN HYDROCHLORIDE 1 DROP: 3 SOLUTION/ DROPS OPHTHALMIC at 05:36

## 2018-07-16 RX ADMIN — METOPROLOL TARTRATE 25 MG: 25 TABLET, FILM COATED ORAL at 05:36

## 2018-07-16 ASSESSMENT — COGNITIVE AND FUNCTIONAL STATUS - GENERAL
TOILETING: A LITTLE
SUGGESTED CMS G CODE MODIFIER DAILY ACTIVITY: CJ
DRESSING REGULAR LOWER BODY CLOTHING: A LITTLE
HELP NEEDED FOR BATHING: A LITTLE
DAILY ACTIVITIY SCORE: 21

## 2018-07-16 ASSESSMENT — ENCOUNTER SYMPTOMS
DIZZINESS: 1
BACK PAIN: 0
DIARRHEA: 0
BLURRED VISION: 0
COUGH: 0
NAUSEA: 0
NECK PAIN: 0
DEPRESSION: 0
HEADACHES: 0
FLANK PAIN: 0
PHOTOPHOBIA: 0
CONSTIPATION: 0
VOMITING: 0
WHEEZING: 0
SPUTUM PRODUCTION: 0
FALLS: 0
ORTHOPNEA: 0
HEMOPTYSIS: 0
DOUBLE VISION: 0
WEIGHT LOSS: 0
ABDOMINAL PAIN: 0
TREMORS: 0
TINGLING: 0
FEVER: 0
PALPITATIONS: 0

## 2018-07-16 ASSESSMENT — PAIN SCALES - GENERAL
PAINLEVEL_OUTOF10: 0

## 2018-07-16 ASSESSMENT — LIFESTYLE VARIABLES: SUBSTANCE_ABUSE: 0

## 2018-07-16 NOTE — PROGRESS NOTES
[facility]    Date of Service: 7/15/2018    Chief Complaint  53 y.o. male admitted 2018 with atypical chest pain    Interval Problem Update  No new issues to report.    Consultants/Specialty  none    Disposition  pending        Review of Systems   Constitutional: Positive for malaise/fatigue. Negative for fever and weight loss.   HENT: Negative for ear discharge, ear pain, hearing loss and tinnitus.    Eyes: Negative for blurred vision, double vision and photophobia.   Respiratory: Negative for cough, hemoptysis, sputum production and wheezing.    Cardiovascular: Negative for chest pain (with taking a deep breath), palpitations and orthopnea.   Gastrointestinal: Negative for abdominal pain, constipation, diarrhea, nausea and vomiting.   Genitourinary: Negative for flank pain, frequency, hematuria and urgency.   Musculoskeletal: Negative for back pain, falls, joint pain and neck pain.   Skin: Negative for itching and rash (both legs/chronic).   Neurological: Positive for dizziness. Negative for tingling, tremors and headaches.   Psychiatric/Behavioral: Negative for depression and suicidal ideas.      Physical Exam  Laboratory/Imaging   Hemodynamics  Temp (24hrs), Av.4 °C (97.5 °F), Min:36.1 °C (97 °F), Max:36.9 °C (98.5 °F)   Temperature: 36.1 °C (97 °F)  Pulse  Av.2  Min: 58  Max: 111    Blood Pressure: 108/87      Respiratory      Respiration: 18, Pulse Oximetry: 98 %        RUL Breath Sounds: Clear, RML Breath Sounds: Clear, RLL Breath Sounds: Clear, ELIAS Breath Sounds: Clear, LLL Breath Sounds: Clear    Fluids    Intake/Output Summary (Last 24 hours) at 07/15/18 1844  Last data filed at 07/15/18 1700   Gross per 24 hour   Intake              510 ml   Output             1600 ml   Net            -1090 ml       Nutrition  Orders Placed This Encounter   Procedures   • Diet Order Cardiac     Standing Status:   Standing     Number of Occurrences:   1     Order Specific Question:    Diet:     Answer:   Cardiac [6]     Physical Exam   Constitutional: He is oriented to person, place, and time. He appears well-nourished. No distress.   HENT:   Right Ear: External ear normal.   Left Ear: External ear normal.   Mouth/Throat: No oropharyngeal exudate.   Eyes: Right eye exhibits no discharge. Left eye exhibits no discharge.   Neck: Normal range of motion. No JVD present. No tracheal deviation present.   Cardiovascular: Regular rhythm and normal heart sounds.  Exam reveals no friction rub.    Pulmonary/Chest: Effort normal and breath sounds normal. No stridor. No respiratory distress. He has no wheezes. He has no rales.   Abdominal: Bowel sounds are normal. He exhibits no distension. There is no tenderness.   Musculoskeletal: He exhibits edema (chronic in both legs). He exhibits no deformity.   Neurological: He is alert and oriented to person, place, and time.   Skin: Skin is warm and dry. Rash (in the legs/chronic) noted. He is not diaphoretic.   Psychiatric: He has a normal mood and affect.       Recent Labs      07/13/18   0329  07/15/18   0704   WBC  8.8  11.0*   RBC  6.28*  6.24*   HEMOGLOBIN  17.0  17.2   HEMATOCRIT  51.7  51.5   MCV  82.3  82.5   MCH  27.1  27.6   MCHC  32.9*  33.4*   RDW  41.0  41.0   PLATELETCT  207  233   MPV  11.4  11.2     Recent Labs      07/13/18   0329  07/15/18   0353   SODIUM  134*  133*   POTASSIUM  3.9  4.4   CHLORIDE  97  99   CO2  25  23   GLUCOSE  127*  144*   BUN  38*  41*   CREATININE  1.01  0.85   CALCIUM  9.7  9.8                      Assessment/Plan     * Fall   Assessment & Plan    On fall precautions.  CT/OT eval.  Pending placement.        Atypical chest pain- (present on admission)   Assessment & Plan    He complainsStress test in January was negative  With taking a deep breath  Echo showed reduced EF from previous study 6 months ago -EF now 40% down from 60%  ACE and BB  Troponins negative  EKG shows his atrial fibrillation without any signs of  ischemia  ASA and statin  OP Cardiology FU            Paroxysmal A-fib (HCC)- (present on admission)   Assessment & Plan    Followed by Dr. Sanchez- Cardiology - as OP  Currently rate controlled  lopressor  Continue Xarelto            Dizziness   Assessment & Plan    Fall precaution        Acute conjunctivitis   Assessment & Plan    ABX eyedrops  monitor        Acquired circulating anticoagulants (HCC)- (present on admission)   Assessment & Plan    Continue Xarelto for PAF        HTN (hypertension)- (present on admission)   Assessment & Plan    Now stable.  Continue to monitor.          HLD (hyperlipidemia)- (present on admission)   Assessment & Plan    Chronic  Continue home statin therapy        GERD (gastroesophageal reflux disease)- (present on admission)   Assessment & Plan    Chronic  Continue home Pepcid        Type 2 diabetes mellitus (HCC)- (present on admission)   Assessment & Plan    Controlled  Holding metformin  Accu-Cheks before meals and at bedtime with SSI  Hem a1c 6.5        Euvolemic Hyponatremia- (present on admission)   Assessment & Plan    Mild.  Continue to monitor.        Plan of care discussed with multidisciplinary team during rounds.  Quality-Core Measures   Reviewed items::  Labs reviewed, Medications reviewed and Radiology images reviewed  Zabala catheter::  No Zabala  DVT prophylaxis pharmacological::  Warfarin (Coumadin)

## 2018-07-16 NOTE — PROGRESS NOTES
Pt is sleeping in bed. Call light within reach. No s/s of pain or discomfort. Will continue to monitor.

## 2018-07-16 NOTE — PROGRESS NOTES
Received Bedside report. Assumed care at 1900. This pt is AOx4, ambulatory with 1 person assist, voiding appropriately in urinal, denies pain. Patient and RN discussed plan of care: questions answered. Labs noted, assessment complete, patient tolerating cardiac diet. Tele box in place. Pt is on RA. Call light in place, fall precautions in place, patient educated on importance of calling for assistance. No additional needs at this time. VSS

## 2018-07-16 NOTE — DISCHARGE PLANNING
Anticipated Discharge Disposition: SNf     Action: LSW provided Pt with SNF choice form, Pt pciked follow SNF choices   1. [facility]   2. [facility]   3. [facility]     LSW faxed to [facility]     Barriers to Discharge: None     Plan: Pt awaiting acceptance to SNF

## 2018-07-16 NOTE — CARE PLAN
Problem: Communication  Goal: The ability to communicate needs accurately and effectively will improve  Listened to patients discussion of concerns related to disease process and treatment plan. Discussed with patient concerns, goals and management. Discussed importance of patient reporting new signs and symptoms related to disease process. Patient verbalized understanding and understands importance of communicating needs.  Supported and educated patient by addressing specific questions regarding diagnosis, risks, benefits of pain management treatment.              Problem: Safety  Goal: Will remain free from falls  Pt is wearing non slip socks, bed is locked and in lowest position, call light and personal belongings are within reach. Fall risk band is on and bed alarm on.

## 2018-07-16 NOTE — PROGRESS NOTES
Assumed care of patient, received bedside report from NOC RN, Pt assessed, A&Ox4, Vitals stable, no SOB or distress noted.  Pt updated on plan of care monitoring blood pressures and heart rate, Call light within reach, personal belongings within reach.  Bed is locked and in lowest position. Tele monitor on, pt's rhythm is Aflutter. Will continue to monitor. Hourly rounding in place.

## 2018-07-16 NOTE — DISCHARGE PLANNING
Agency/Facility Name: [facility]  Spoke To: Ratna  Outcome: Accepted pending auth.

## 2018-07-16 NOTE — DISCHARGE PLANNING
Received Choice form at 1654  Agency/Facility Name: [facility] [place] [facility]  Referral sent per Choice form @ 0523

## 2018-07-16 NOTE — THERAPY
"Occupational Therapy Treatment completed with focus on ADLs, ADL transfers and patient education.  Functional Status:  Seated grooming with setup; sit>Stand with SBA  Plan of Care: Will benefit from Occupational Therapy 3 times per week  Discharge Recommendations:  Equipment Will Continue to Assess for Equipment Needs. Post-acute therapy Discharge to a transitional care facility for continued skilled therapy services.    See \"Rehab Therapy-Acute\" Patient Summary Report for complete documentation.     Pt participated in OT tx session. Pt's BP and HR monitored throughout session: BP 98/69 while seated with HR 83, /73 after initial stand with HR 53, BP 80/68 with HR 37 after third stand. Pt unable to tolerate standing for more than a min without dizziness and unable to func amb to sink or BR. Pt would benefit from stay in transitional care facility prior to d/c home. Will continue to follow for acute OT services while in-house.  "

## 2018-07-16 NOTE — PROGRESS NOTES
12 hour chart check    Monitor summary  A-fib 80's-90's rate as high as 150's-160's while ambulating, non sustained. Returns to controlled rate at rest.   /.10/

## 2018-07-16 NOTE — CARE PLAN
Problem: Communication  Goal: The ability to communicate needs accurately and effectively will improve  Outcome: PROGRESSING AS EXPECTED  Patient educated to utilize call light. Patient and family oriented to hospital room. Patient encouraged to ask questions about plan of care. Patient effectively uses call light and is involved in POC.    Problem: Knowledge Deficit  Goal: Knowledge of disease process/condition, treatment plan, diagnostic tests, and medications will improve  Outcome: PROGRESSING AS EXPECTED  Patient is educated of disease process and condition. Treatment team has included patient in plan of care. All medications indications and side effects are explained. Patient is encouraged to ask questions. Patient indicates understanding.

## 2018-07-17 NOTE — PROGRESS NOTES
[facility]ist Progress Note    Date of Service: 2018    Chief Complaint  53 y.o. male admitted 2018 with atypical chest pain    Interval Problem Update  Feels stronger this morning. No new issues to report.     Consultants/Specialty  none    Disposition  Pending SNF placement.         Review of Systems   Constitutional: Positive for malaise/fatigue. Negative for fever and weight loss.   HENT: Negative for ear discharge, ear pain, hearing loss, nosebleeds and tinnitus.    Eyes: Negative for blurred vision, double vision and photophobia.   Respiratory: Negative for cough, hemoptysis, sputum production and wheezing.    Cardiovascular: Negative for chest pain (with taking a deep breath), palpitations and orthopnea.   Gastrointestinal: Negative for abdominal pain, constipation, diarrhea, nausea and vomiting.   Genitourinary: Negative for flank pain, frequency, hematuria and urgency.   Musculoskeletal: Negative for back pain, falls, joint pain and neck pain.   Skin: Negative for itching and rash (both legs/chronic).   Neurological: Positive for dizziness. Negative for tingling, tremors and headaches.   Psychiatric/Behavioral: Negative for depression, substance abuse and suicidal ideas.      Physical Exam  Laboratory/Imaging   Hemodynamics  Temp (24hrs), Av.3 °C (97.3 °F), Min:35.9 °C (96.6 °F), Max:36.6 °C (97.9 °F)   Temperature: 36.6 °C (97.9 °F)  Pulse  Av  Min: 58  Max: 160    Blood Pressure: 119/75      Respiratory      Respiration: 18, Pulse Oximetry: 92 %        RUL Breath Sounds: Clear, RML Breath Sounds: Clear, RLL Breath Sounds: Clear;Diminished, ELIAS Breath Sounds: Clear, LLL Breath Sounds: Clear;Diminished    Fluids    Intake/Output Summary (Last 24 hours) at 18 2311  Last data filed at 18   Gross per 24 hour   Intake              535 ml   Output              750 ml   Net             -215 ml       Nutrition  Orders Placed This Encounter   Procedures   • Diet Order Cardiac      Standing Status:   Standing     Number of Occurrences:   1     Order Specific Question:   Diet:     Answer:   Cardiac [6]     Physical Exam   Constitutional: He is oriented to person, place, and time. He appears well-nourished. No distress.   HENT:   Right Ear: External ear normal.   Left Ear: External ear normal.   Mouth/Throat: No oropharyngeal exudate.   Eyes: Right eye exhibits no discharge. Left eye exhibits no discharge.   Neck: Normal range of motion. No JVD present. No tracheal deviation present. No thyromegaly present.   Cardiovascular: Regular rhythm and normal heart sounds.  Exam reveals no gallop and no friction rub.    Pulmonary/Chest: Effort normal and breath sounds normal. No stridor. No respiratory distress. He has no wheezes.   Abdominal: Bowel sounds are normal. He exhibits no distension. There is no tenderness.   Musculoskeletal: He exhibits edema (chronic in both legs). He exhibits no deformity.   Neurological: He is alert and oriented to person, place, and time.   Skin: Skin is warm and dry. Rash (in the legs/chronic) noted.   Psychiatric: He has a normal mood and affect. His behavior is normal.       Recent Labs      07/15/18   0704  07/16/18   0458   WBC  11.0*  11.8*   RBC  6.24*  6.44*   HEMOGLOBIN  17.2  17.7   HEMATOCRIT  51.5  53.1*   MCV  82.5  82.5   MCH  27.6  27.5   MCHC  33.4*  33.3*   RDW  41.0  40.0   PLATELETCT  233  226   MPV  11.2  11.0     Recent Labs      07/15/18   0353  07/16/18   0458   SODIUM  133*  133*   POTASSIUM  4.4  3.9   CHLORIDE  99  98   CO2  23  25   GLUCOSE  144*  148*   BUN  41*  37*   CREATININE  0.85  0.85   CALCIUM  9.8  9.7                      Assessment/Plan     * Fall   Assessment & Plan    On fall precautions.  CT/OT eval.  Pending placement.        Atypical chest pain- (present on admission)   Assessment & Plan    He complainsStress test in January was negative  With taking a deep breath  Echo showed reduced EF from previous study 6 months ago -EF  now 40% down from 60%  ACE and BB  Troponins negative  EKG shows his atrial fibrillation without any signs of ischemia  ASA and statin  OP Cardiology FU            Paroxysmal A-fib (HCC)- (present on admission)   Assessment & Plan    Followed by Dr. Sanchez- Cardiology - as OP  Currently rate controlled  lopressor  Continue Xarelto            Dizziness   Assessment & Plan    Fall precaution        Acute conjunctivitis   Assessment & Plan    ABX eyedrops  monitor        Acquired circulating anticoagulants (HCC)- (present on admission)   Assessment & Plan    Continue Xarelto for PAF        HTN (hypertension)- (present on admission)   Assessment & Plan    Now stable.  Continue to monitor.          HLD (hyperlipidemia)- (present on admission)   Assessment & Plan    Chronic  Continue home statin therapy        GERD (gastroesophageal reflux disease)- (present on admission)   Assessment & Plan    Chronic  Continue home Pepcid        Type 2 diabetes mellitus (HCC)- (present on admission)   Assessment & Plan    Controlled  Holding metformin  Accu-Cheks before meals and at bedtime with SSI  Hem a1c 6.5        Euvolemic Hyponatremia- (present on admission)   Assessment & Plan    Mild.  Continue to monitor.        Plan of care discussed with multidisciplinary team during rounds.  Quality-Core Measures   Reviewed items::  Labs reviewed, Medications reviewed and Radiology images reviewed  Zabala catheter::  No Zabala  DVT prophylaxis pharmacological::  Warfarin (Coumadin)

## 2018-07-17 NOTE — PROGRESS NOTES
"At 2200 monitor room called that patient's heart rate was in 160's. Bed alarm went off briefly and was turned off by patient.  Patient was sitting up at bedside, stated he was just repositioning and had no needs at this time. Educated on calling before getting up, patient laid back in bed.    At 2210  said a patient was walking around with a personal belongings bag from room 738, then shortly after a monitor check was called as patient's heart rate was in 180's and then went into signal loss.  This RN went to check on patient and patient was not found in room.  Unit was searched by charge RN and others and security notified as telemetry box was not found.  This RN ran down to check ER area for patient. Saw patient walking outside ER, approached patient and asked if he still had monitor and IV. Patient gave this RN monitor and leads back and allowed IV to be taken out.  Patient asked where he was going and he stated, \"[address] home.\" When asked why, patient would not answer nor would he answer any other questions. Patient then went to bus stop.   "

## 2018-07-17 NOTE — CARE PLAN
Problem: Infection  Goal: Will remain free from infection    Intervention: Implement standard precautions and perform hand washing before and after patient contact  Hand hygiene performed before and after patient contact.      Problem: Knowledge Deficit  Goal: Knowledge of disease process/condition, treatment plan, diagnostic tests, and medications will improve    Intervention: Assess knowledge level of disease process/condition, treatment plan, diagnostic tests, and medications  Knowledge assessed regarding plan of care, patient verbalized understanding.

## 2018-07-17 NOTE — PROGRESS NOTES
Sebastián Castro patient has chosen to leave the hospital against medical advice. The attending physician has not discharged the patient. Patient is not a risk to himself or others. Discharge against medical advice form has been Patient left abruptly - no chance to sign.      Attending physician has been notified.

## 2018-07-19 ENCOUNTER — APPOINTMENT (OUTPATIENT)
Dept: RADIOLOGY | Facility: MEDICAL CENTER | Age: 53
DRG: 286 | End: 2018-07-19
Attending: EMERGENCY MEDICINE
Payer: MEDICAID

## 2018-07-19 ENCOUNTER — HOSPITAL ENCOUNTER (INPATIENT)
Facility: MEDICAL CENTER | Age: 53
LOS: 6 days | DRG: 286 | End: 2018-07-25
Attending: EMERGENCY MEDICINE | Admitting: HOSPITALIST
Payer: MEDICAID

## 2018-07-19 DIAGNOSIS — D68.318 ACQUIRED CIRCULATING ANTICOAGULANTS (HCC): ICD-10-CM

## 2018-07-19 DIAGNOSIS — I48.91 ATRIAL FIBRILLATION WITH RAPID VENTRICULAR RESPONSE (HCC): ICD-10-CM

## 2018-07-19 DIAGNOSIS — L08.9 DIABETIC INFECTION OF RIGHT FOOT (HCC): ICD-10-CM

## 2018-07-19 DIAGNOSIS — I10 ESSENTIAL HYPERTENSION: Chronic | ICD-10-CM

## 2018-07-19 DIAGNOSIS — I48.0 PAROXYSMAL A-FIB (HCC): ICD-10-CM

## 2018-07-19 DIAGNOSIS — H10.30 ACUTE CONJUNCTIVITIS, UNSPECIFIED ACUTE CONJUNCTIVITIS TYPE, UNSPECIFIED LATERALITY: ICD-10-CM

## 2018-07-19 DIAGNOSIS — E11.69 HYPERLIPIDEMIA ASSOCIATED WITH TYPE 2 DIABETES MELLITUS (HCC): ICD-10-CM

## 2018-07-19 DIAGNOSIS — G82.20 PARALYSIS OF BOTH LOWER LIMBS (HCC): ICD-10-CM

## 2018-07-19 DIAGNOSIS — E78.5 HYPERLIPIDEMIA ASSOCIATED WITH TYPE 2 DIABETES MELLITUS (HCC): ICD-10-CM

## 2018-07-19 DIAGNOSIS — R07.89 ATYPICAL CHEST PAIN: ICD-10-CM

## 2018-07-19 DIAGNOSIS — E11.628 DIABETIC INFECTION OF RIGHT FOOT (HCC): ICD-10-CM

## 2018-07-19 DIAGNOSIS — E11.8 DIABETIC FOOT (HCC): ICD-10-CM

## 2018-07-19 PROBLEM — R55 SYNCOPE: Status: ACTIVE | Noted: 2018-07-19

## 2018-07-19 LAB
ALBUMIN SERPL BCP-MCNC: 4.1 G/DL (ref 3.2–4.9)
ALBUMIN/GLOB SERPL: 1.1 G/DL
ALP SERPL-CCNC: 90 U/L (ref 30–99)
ALT SERPL-CCNC: 31 U/L (ref 2–50)
ANION GAP SERPL CALC-SCNC: 11 MMOL/L (ref 0–11.9)
APTT PPP: <20 SEC (ref 24.7–36)
AST SERPL-CCNC: 26 U/L (ref 12–45)
BASOPHILS # BLD AUTO: 0.4 % (ref 0–1.8)
BASOPHILS # BLD: 0.05 K/UL (ref 0–0.12)
BILIRUB SERPL-MCNC: 0.8 MG/DL (ref 0.1–1.5)
BNP SERPL-MCNC: 114 PG/ML (ref 0–100)
BUN SERPL-MCNC: 43 MG/DL (ref 8–22)
CALCIUM SERPL-MCNC: 9.9 MG/DL (ref 8.5–10.5)
CHLORIDE SERPL-SCNC: 102 MMOL/L (ref 96–112)
CO2 SERPL-SCNC: 21 MMOL/L (ref 20–33)
CREAT SERPL-MCNC: 0.93 MG/DL (ref 0.5–1.4)
EKG IMPRESSION: NORMAL
EKG IMPRESSION: NORMAL
EOSINOPHIL # BLD AUTO: 0.25 K/UL (ref 0–0.51)
EOSINOPHIL NFR BLD: 1.8 % (ref 0–6.9)
ERYTHROCYTE [DISTWIDTH] IN BLOOD BY AUTOMATED COUNT: 39.8 FL (ref 35.9–50)
GLOBULIN SER CALC-MCNC: 3.9 G/DL (ref 1.9–3.5)
GLUCOSE BLD-MCNC: 153 MG/DL (ref 65–99)
GLUCOSE BLD-MCNC: 159 MG/DL (ref 65–99)
GLUCOSE SERPL-MCNC: 232 MG/DL (ref 65–99)
HCT VFR BLD AUTO: 47.8 % (ref 42–52)
HGB BLD-MCNC: 16.3 G/DL (ref 14–18)
IMM GRANULOCYTES # BLD AUTO: 0.06 K/UL (ref 0–0.11)
IMM GRANULOCYTES NFR BLD AUTO: 0.4 % (ref 0–0.9)
INR PPP: 1.14 (ref 0.87–1.13)
LIPASE SERPL-CCNC: 26 U/L (ref 11–82)
LYMPHOCYTES # BLD AUTO: 3.93 K/UL (ref 1–4.8)
LYMPHOCYTES NFR BLD: 28.6 % (ref 22–41)
MAGNESIUM SERPL-MCNC: 1.9 MG/DL (ref 1.5–2.5)
MCH RBC QN AUTO: 27.8 PG (ref 27–33)
MCHC RBC AUTO-ENTMCNC: 34.1 G/DL (ref 33.7–35.3)
MCV RBC AUTO: 81.6 FL (ref 81.4–97.8)
MONOCYTES # BLD AUTO: 0.95 K/UL (ref 0–0.85)
MONOCYTES NFR BLD AUTO: 6.9 % (ref 0–13.4)
NEUTROPHILS # BLD AUTO: 8.49 K/UL (ref 1.82–7.42)
NEUTROPHILS NFR BLD: 61.9 % (ref 44–72)
NRBC # BLD AUTO: 0 K/UL
NRBC BLD-RTO: 0 /100 WBC
PLATELET # BLD AUTO: 250 K/UL (ref 164–446)
PMV BLD AUTO: 11.7 FL (ref 9–12.9)
POTASSIUM SERPL-SCNC: 4 MMOL/L (ref 3.6–5.5)
PROT SERPL-MCNC: 8 G/DL (ref 6–8.2)
PROTHROMBIN TIME: 14.3 SEC (ref 12–14.6)
RBC # BLD AUTO: 5.86 M/UL (ref 4.7–6.1)
SODIUM SERPL-SCNC: 134 MMOL/L (ref 135–145)
T4 FREE SERPL-MCNC: 1.34 NG/DL (ref 0.53–1.43)
TROPONIN I SERPL-MCNC: <0.01 NG/ML (ref 0–0.04)
TSH SERPL DL<=0.005 MIU/L-ACNC: 1.19 UIU/ML (ref 0.38–5.33)
WBC # BLD AUTO: 13.7 K/UL (ref 4.8–10.8)

## 2018-07-19 PROCEDURE — 83690 ASSAY OF LIPASE: CPT

## 2018-07-19 PROCEDURE — 70450 CT HEAD/BRAIN W/O DYE: CPT

## 2018-07-19 PROCEDURE — 700105 HCHG RX REV CODE 258: Performed by: EMERGENCY MEDICINE

## 2018-07-19 PROCEDURE — 85025 COMPLETE CBC W/AUTO DIFF WBC: CPT

## 2018-07-19 PROCEDURE — 94760 N-INVAS EAR/PLS OXIMETRY 1: CPT

## 2018-07-19 PROCEDURE — 96365 THER/PROPH/DIAG IV INF INIT: CPT

## 2018-07-19 PROCEDURE — 770020 HCHG ROOM/CARE - TELE (206)

## 2018-07-19 PROCEDURE — 99223 1ST HOSP IP/OBS HIGH 75: CPT | Performed by: HOSPITALIST

## 2018-07-19 PROCEDURE — 99285 EMERGENCY DEPT VISIT HI MDM: CPT

## 2018-07-19 PROCEDURE — 700105 HCHG RX REV CODE 258: Performed by: HOSPITALIST

## 2018-07-19 PROCEDURE — 96375 TX/PRO/DX INJ NEW DRUG ADDON: CPT

## 2018-07-19 PROCEDURE — 93005 ELECTROCARDIOGRAM TRACING: CPT | Performed by: EMERGENCY MEDICINE

## 2018-07-19 PROCEDURE — 700111 HCHG RX REV CODE 636 W/ 250 OVERRIDE (IP): Performed by: HOSPITALIST

## 2018-07-19 PROCEDURE — 700102 HCHG RX REV CODE 250 W/ 637 OVERRIDE(OP): Performed by: HOSPITALIST

## 2018-07-19 PROCEDURE — 36415 COLL VENOUS BLD VENIPUNCTURE: CPT

## 2018-07-19 PROCEDURE — 83735 ASSAY OF MAGNESIUM: CPT

## 2018-07-19 PROCEDURE — 83880 ASSAY OF NATRIURETIC PEPTIDE: CPT

## 2018-07-19 PROCEDURE — 82962 GLUCOSE BLOOD TEST: CPT

## 2018-07-19 PROCEDURE — 84484 ASSAY OF TROPONIN QUANT: CPT

## 2018-07-19 PROCEDURE — 85610 PROTHROMBIN TIME: CPT

## 2018-07-19 PROCEDURE — 71045 X-RAY EXAM CHEST 1 VIEW: CPT

## 2018-07-19 PROCEDURE — 700111 HCHG RX REV CODE 636 W/ 250 OVERRIDE (IP): Performed by: EMERGENCY MEDICINE

## 2018-07-19 PROCEDURE — 84443 ASSAY THYROID STIM HORMONE: CPT

## 2018-07-19 PROCEDURE — A9270 NON-COVERED ITEM OR SERVICE: HCPCS | Performed by: HOSPITALIST

## 2018-07-19 PROCEDURE — 85730 THROMBOPLASTIN TIME PARTIAL: CPT

## 2018-07-19 PROCEDURE — 93005 ELECTROCARDIOGRAM TRACING: CPT

## 2018-07-19 PROCEDURE — 80053 COMPREHEN METABOLIC PANEL: CPT

## 2018-07-19 PROCEDURE — 96366 THER/PROPH/DIAG IV INF ADDON: CPT

## 2018-07-19 PROCEDURE — 84439 ASSAY OF FREE THYROXINE: CPT

## 2018-07-19 RX ORDER — BISACODYL 10 MG
10 SUPPOSITORY, RECTAL RECTAL
Status: DISCONTINUED | OUTPATIENT
Start: 2018-07-19 | End: 2018-07-25 | Stop reason: HOSPADM

## 2018-07-19 RX ORDER — ADENOSINE 3 MG/ML
12 INJECTION, SOLUTION INTRAVENOUS ONCE
Status: COMPLETED | OUTPATIENT
Start: 2018-07-19 | End: 2018-07-19

## 2018-07-19 RX ORDER — AMOXICILLIN 250 MG
2 CAPSULE ORAL 2 TIMES DAILY
Status: DISCONTINUED | OUTPATIENT
Start: 2018-07-19 | End: 2018-07-25 | Stop reason: HOSPADM

## 2018-07-19 RX ORDER — LISINOPRIL 20 MG/1
40 TABLET ORAL DAILY
Status: DISCONTINUED | OUTPATIENT
Start: 2018-07-20 | End: 2018-07-20

## 2018-07-19 RX ORDER — ADENOSINE 3 MG/ML
INJECTION, SOLUTION INTRAVENOUS
Status: COMPLETED
Start: 2018-07-19 | End: 2018-07-19

## 2018-07-19 RX ORDER — ONDANSETRON 2 MG/ML
4 INJECTION INTRAMUSCULAR; INTRAVENOUS EVERY 4 HOURS PRN
Status: DISCONTINUED | OUTPATIENT
Start: 2018-07-19 | End: 2018-07-21

## 2018-07-19 RX ORDER — DILTIAZEM HYDROCHLORIDE 5 MG/ML
INJECTION INTRAVENOUS
Status: COMPLETED
Start: 2018-07-19 | End: 2018-07-19

## 2018-07-19 RX ORDER — POLYETHYLENE GLYCOL 3350 17 G/17G
1 POWDER, FOR SOLUTION ORAL
Status: DISCONTINUED | OUTPATIENT
Start: 2018-07-19 | End: 2018-07-25 | Stop reason: HOSPADM

## 2018-07-19 RX ORDER — DILTIAZEM HYDROCHLORIDE 5 MG/ML
25 INJECTION INTRAVENOUS ONCE
Status: COMPLETED | OUTPATIENT
Start: 2018-07-19 | End: 2018-07-19

## 2018-07-19 RX ORDER — ADENOSINE 3 MG/ML
6 INJECTION, SOLUTION INTRAVENOUS ONCE
Status: COMPLETED | OUTPATIENT
Start: 2018-07-19 | End: 2018-07-19

## 2018-07-19 RX ORDER — ACETAMINOPHEN 325 MG/1
650 TABLET ORAL EVERY 6 HOURS PRN
Status: DISCONTINUED | OUTPATIENT
Start: 2018-07-19 | End: 2018-07-21

## 2018-07-19 RX ORDER — SODIUM CHLORIDE 9 MG/ML
1000 INJECTION, SOLUTION INTRAVENOUS ONCE
Status: COMPLETED | OUTPATIENT
Start: 2018-07-19 | End: 2018-07-19

## 2018-07-19 RX ORDER — DEXTROSE MONOHYDRATE 25 G/50ML
25 INJECTION, SOLUTION INTRAVENOUS
Status: DISCONTINUED | OUTPATIENT
Start: 2018-07-19 | End: 2018-07-25 | Stop reason: HOSPADM

## 2018-07-19 RX ORDER — ONDANSETRON 4 MG/1
4 TABLET, ORALLY DISINTEGRATING ORAL EVERY 4 HOURS PRN
Status: DISCONTINUED | OUTPATIENT
Start: 2018-07-19 | End: 2018-07-25 | Stop reason: HOSPADM

## 2018-07-19 RX ORDER — PRAVASTATIN SODIUM 20 MG
20 TABLET ORAL EVERY EVENING
Status: DISCONTINUED | OUTPATIENT
Start: 2018-07-19 | End: 2018-07-25 | Stop reason: HOSPADM

## 2018-07-19 RX ORDER — METOPROLOL TARTRATE 50 MG/1
50 TABLET, FILM COATED ORAL 2 TIMES DAILY
Status: DISCONTINUED | OUTPATIENT
Start: 2018-07-19 | End: 2018-07-20

## 2018-07-19 RX ORDER — PROMETHAZINE HYDROCHLORIDE 25 MG/1
12.5-25 TABLET ORAL EVERY 4 HOURS PRN
Status: DISCONTINUED | OUTPATIENT
Start: 2018-07-19 | End: 2018-07-25 | Stop reason: HOSPADM

## 2018-07-19 RX ORDER — PROMETHAZINE HYDROCHLORIDE 25 MG/1
12.5-25 SUPPOSITORY RECTAL EVERY 4 HOURS PRN
Status: DISCONTINUED | OUTPATIENT
Start: 2018-07-19 | End: 2018-07-25 | Stop reason: HOSPADM

## 2018-07-19 RX ORDER — FAMOTIDINE 20 MG/1
20 TABLET, FILM COATED ORAL 2 TIMES DAILY
Status: DISCONTINUED | OUTPATIENT
Start: 2018-07-19 | End: 2018-07-25 | Stop reason: HOSPADM

## 2018-07-19 RX ADMIN — SODIUM CHLORIDE 1000 ML: 9 INJECTION, SOLUTION INTRAVENOUS at 10:30

## 2018-07-19 RX ADMIN — RIVAROXABAN 20 MG: 20 TABLET, FILM COATED ORAL at 17:08

## 2018-07-19 RX ADMIN — METOPROLOL TARTRATE 50 MG: 50 TABLET ORAL at 17:08

## 2018-07-19 RX ADMIN — DILTIAZEM HYDROCHLORIDE 25 MG: 5 INJECTION INTRAVENOUS at 10:59

## 2018-07-19 RX ADMIN — PRAVASTATIN SODIUM 20 MG: 20 TABLET ORAL at 17:08

## 2018-07-19 RX ADMIN — METFORMIN HYDROCHLORIDE 1000 MG: 500 TABLET ORAL at 17:08

## 2018-07-19 RX ADMIN — ADENOSINE 6 MG: 3 INJECTION, SOLUTION INTRAVENOUS at 10:49

## 2018-07-19 RX ADMIN — FAMOTIDINE 20 MG: 20 TABLET ORAL at 17:08

## 2018-07-19 RX ADMIN — DILTIAZEM HYDROCHLORIDE 15 MG/HR: 5 INJECTION INTRAVENOUS at 18:20

## 2018-07-19 RX ADMIN — INSULIN HUMAN 2 UNITS: 100 INJECTION, SOLUTION PARENTERAL at 17:17

## 2018-07-19 RX ADMIN — ADENOSINE 12 MG: 3 INJECTION, SOLUTION INTRAVENOUS at 10:54

## 2018-07-19 RX ADMIN — DILTIAZEM HYDROCHLORIDE 5 MG/HR: 5 INJECTION INTRAVENOUS at 11:33

## 2018-07-19 RX ADMIN — INSULIN HUMAN 2 UNITS: 100 INJECTION, SOLUTION PARENTERAL at 20:49

## 2018-07-19 RX ADMIN — ADENOSINE 12 MG: 3 INJECTION, SOLUTION INTRAVENOUS at 11:03

## 2018-07-19 ASSESSMENT — ENCOUNTER SYMPTOMS
HEADACHES: 0
CHILLS: 0
FOCAL WEAKNESS: 0
WEAKNESS: 1
DIARRHEA: 0
PALPITATIONS: 1
BLOOD IN STOOL: 0
ABDOMINAL PAIN: 0
DIZZINESS: 0
VOMITING: 0
SHORTNESS OF BREATH: 0
SORE THROAT: 0
NAUSEA: 0
COUGH: 0
FEVER: 0
NERVOUS/ANXIOUS: 0
MYALGIAS: 0
BLURRED VISION: 0

## 2018-07-19 ASSESSMENT — COGNITIVE AND FUNCTIONAL STATUS - GENERAL
DAILY ACTIVITIY SCORE: 24
SUGGESTED CMS G CODE MODIFIER DAILY ACTIVITY: CH
SUGGESTED CMS G CODE MODIFIER MOBILITY: CH
MOBILITY SCORE: 24

## 2018-07-19 ASSESSMENT — LIFESTYLE VARIABLES
SUBSTANCE_ABUSE: 0
EVER_SMOKED: NEVER
ALCOHOL_USE: NO

## 2018-07-19 ASSESSMENT — PAIN SCALES - GENERAL
PAINLEVEL_OUTOF10: 0
PAINLEVEL_OUTOF10: 0

## 2018-07-19 ASSESSMENT — COPD QUESTIONNAIRES
HAVE YOU SMOKED AT LEAST 100 CIGARETTES IN YOUR ENTIRE LIFE: NO/DON'T KNOW
IN THE PAST 12 MONTHS DO YOU DO LESS THAN YOU USED TO BECAUSE OF YOUR BREATHING PROBLEMS: DISAGREE/UNSURE
DURING THE PAST 4 WEEKS HOW MUCH DID YOU FEEL SHORT OF BREATH: NONE/LITTLE OF THE TIME
DO YOU EVER COUGH UP ANY MUCUS OR PHLEGM?: NO/ONLY WITH OCCASIONAL COLDS OR INFECTIONS
COPD SCREENING SCORE: 1

## 2018-07-19 ASSESSMENT — PATIENT HEALTH QUESTIONNAIRE - PHQ9
2. FEELING DOWN, DEPRESSED, IRRITABLE, OR HOPELESS: NOT AT ALL
SUM OF ALL RESPONSES TO PHQ9 QUESTIONS 1 AND 2: 0
1. LITTLE INTEREST OR PLEASURE IN DOING THINGS: NOT AT ALL

## 2018-07-19 NOTE — ASSESSMENT & PLAN NOTE
Recently had decrease in his home metoprolol from 100 mg twice daily to 25 mg twice daily.  Denies any recent dehydration or volume loss.  s/p  Diltiazem drip.  Cardiology on board  Monitor on telemetry  Correct all electrolyte abnormalities.  Had recent echocardiogram with EF of 40%  s/p cardiac cath 7/21/2018 negative for cad   -Will resume Xarelto/ASA  CHADS score of at lest 2  Patient is now rate controlled   -

## 2018-07-19 NOTE — PROGRESS NOTES
2 RN skin check completed with Carmelina PÉREZ. Redness and moisture noted under abdominal fold as well as in vj area. Back has scattered scabs, coccyx intact and blanching. Bilateral heels intact and blanching, right great toe amputated, scar well approximated and healed. Bilateral lower extremities dusky.

## 2018-07-19 NOTE — ED NOTES
This RN and Dr Rivera at bedside for IV adenosine push.   18 gauge PIV established. 3-way stopcock attached to PIV.  6mg given at 1049  12mg given at 1054  12mg given at 1058    Unsuccessful attempt at decreasing rate of rapid a-fib.

## 2018-07-19 NOTE — H&P
Hospital Medicine History & Physical Note    Date of Service  7/19/2018    Primary Care Physician  Jerad Lomax M.D.    Consultants  Cardiology    Code Status  Full    Chief Complaint  Passed out today and felt his heart racing    History of Presenting Illness  53 y.o. male who presented 7/19/2018 with a history of paroxysmal atrial fibrillation and is on chronic anticoagulation.  Patient states he was recently in the hospital in earlier part of this month and upon discharge they lowered his metoprolol from 100 mg twice daily to 25 mg twice daily due to orthostatic dizziness.  He states he had been taking his medications and it taken his medications this morning.  He stepped outside to water his plants in his garden this morning and he noted some dizziness he states the next thing he knew he is waking up on the ground.  He fell on the right frontal part of this head but has no noted trauma.  Head CT was unremarkable here in the ER.  Patient felt palpitations in his heart racing and some mild shortness of breath but no overt chest pain.  He decided to call an ambulance and was brought into the hospital and found to be in atrial fibrillation with a rapid ventricular rate with heart rate in the 160s.  He was initially given adenosine with no response and then was given diltiazem and had improvement.  He is currently on diltiazem drip.  He is awake alert and in no distress currently.  He is aware that he will be admitted for heart rate control.  I have consulted renowned cardiologist.    Review of Systems  Review of Systems   Constitutional: Negative for chills and fever.   HENT: Negative for congestion, nosebleeds and sore throat.    Eyes: Negative for blurred vision.   Respiratory: Negative for cough and shortness of breath.    Cardiovascular: Positive for palpitations. Negative for chest pain and leg swelling.   Gastrointestinal: Negative for abdominal pain, blood in stool, diarrhea, nausea and vomiting.  "  Genitourinary: Negative for dysuria and frequency.   Musculoskeletal: Negative for myalgias.   Neurological: Positive for weakness. Negative for dizziness, focal weakness and headaches.   Psychiatric/Behavioral: Negative for substance abuse. The patient is not nervous/anxious.        Past Medical History   has a past medical history of A-fib ([facility]); Chest pain; Diabetes ([facility]); Diabetic neuropathy ([facility]); Hypercholesterolemia; Hypertension; Morbid obesity ([facility]); Noncompliance; and Normal cardiac stress test.    Surgical History   has a past surgical history that includes toe amputation (Right, 11/10/2017); toe amputation (Right, 2018); and irrigation & debridement ortho (Right, 2018).     Family History  Both parents are .  Mother  in her 80s \"old age father  from coronary artery disease in his mid 40s.    Social History   reports that he has never smoked. He has never used smokeless tobacco. He reports that he does not drink alcohol or use drugs.    Allergies  Allergies   Allergen Reactions   • Daptomycin Rash     Body rash  RXN=2018     • Pcn [Penicillins] Hives and Rash      Rash & hives  RXN=since a kid   • Unasyn [Ampicillin-Sulbactam Sodium] Hives, Itching and Swelling   • Vancomycin Rash     Red man syndrome       Medications  Prior to Admission Medications   Prescriptions Last Dose Informant Patient Reported? Taking?   acetaminophen (TYLENOL) 325 MG Tab 2018 at pm Historical No No   Sig: Take 2 Tabs by mouth every 6 hours as needed (Mild Pain; (Pain scale 1-3); Temp greater than 100.5 F).   aspirin 81 MG EC tablet 2018 at am Historical No No   Sig: Take 1 Tab by mouth every day.   famotidine (PEPCID) 20 MG Tab 2018 at am Historical No No   Sig: Take 1 Tab by mouth 2 times a day.   hydroCHLOROthiazide (HYDRODIURIL) 25 MG Tab 2018 at am Historical No No   Sig: Take 1 Tab by mouth every day.   lisinopril (PRINIVIL, ZESTRIL) 40 MG tablet 2018 at am Historical " No No   Sig: Take 1 Tab by mouth every day.   metFORMIN (GLUCOPHAGE) 500 MG Tab 7/19/2018 at am Historical No No   Sig: Take 2 Tabs by mouth 2 times a day, with meals.   metoprolol (LOPRESSOR) 100 MG Tab 7/19/2018 at am Historical No No   Sig: Take 1 Tab by mouth 2 times a day.   pravastatin (PRAVACHOL) 20 MG Tab 7/18/2018 at pm Historical No No   Sig: Take 1 Tab by mouth every evening.   rivaroxaban (XARELTO) 20 MG Tab tablet 7/18/2018 at pm Historical No No   Sig: Take 1 Tab by mouth with dinner.      Facility-Administered Medications: None       Physical Exam  Blood Pressure: 116/72   Temperature: 36.8 °C (98.3 °F)   Pulse: 88       Pulse Oximetry: 93 %     Physical Exam   Constitutional: He is oriented to person, place, and time. He appears well-developed and well-nourished. No distress.   HENT:   Head: Normocephalic and atraumatic.   Nose: Nose normal.   Mouth/Throat: Oropharynx is clear and moist.   Eyes: Conjunctivae and EOM are normal. Right eye exhibits no discharge. Left eye exhibits no discharge. No scleral icterus.   Neck: No tracheal deviation present.   Cardiovascular: Normal heart sounds and intact distal pulses.  An irregularly irregular rhythm present. Tachycardia present.    No murmur heard.  Pulses:       Radial pulses are 2+ on the right side, and 2+ on the left side.        Dorsalis pedis pulses are 2+ on the right side, and 2+ on the left side.   Pulmonary/Chest: Effort normal and breath sounds normal. No respiratory distress. He has no wheezes. He has no rales.   Abdominal: Soft. Bowel sounds are normal. He exhibits no distension. There is no tenderness.   Musculoskeletal: He exhibits no edema or tenderness.   Lymphadenopathy:     He has no cervical adenopathy.   Neurological: He is alert and oriented to person, place, and time. No cranial nerve deficit.   Skin: Skin is warm. He is not diaphoretic.   Psychiatric: He has a normal mood and affect. His behavior is normal. Thought content normal.    Vitals reviewed.      Laboratory:  Recent Labs      07/19/18   1013   WBC  13.7*   RBC  5.86   HEMOGLOBIN  16.3   HEMATOCRIT  47.8   MCV  81.6   MCH  27.8   MCHC  34.1   RDW  39.8   PLATELETCT  250   MPV  11.7     Recent Labs      07/19/18   1013   SODIUM  134*   POTASSIUM  4.0   CHLORIDE  102   CO2  21   GLUCOSE  232*   BUN  43*   CREATININE  0.93   CALCIUM  9.9     Recent Labs      07/19/18   1013   ALTSGPT  31   ASTSGOT  26   ALKPHOSPHAT  90   TBILIRUBIN  0.8   LIPASE  26   GLUCOSE  232*     Recent Labs      07/19/18   1047   APTT  <20.0*   INR  1.14*     Recent Labs      07/19/18   1013   BNPBTYPENAT  114*         Lab Results   Component Value Date    TROPONINI <0.01 07/19/2018       Urinalysis:    Lab Results   Component Value Date    SPECGRAVITY 1.025 03/16/2018    GLUCOSEUR Negative 03/16/2018    KETONES Negative 03/16/2018    NITRITE Negative 03/16/2018    WBCURINE 2-5 (A) 03/16/2018    RBCURINE 2-5 (A) 03/16/2018    BACTERIA Negative 03/16/2018    EPITHELCELL Few 03/16/2018        Imaging:  CT-HEAD W/O   Final Result         1. No acute intracranial abnormality. No evidence of acute intracranial hemorrhage or mass lesion.               DX-CHEST-LIMITED (1 VIEW)   Final Result         No acute cardiopulmonary abnormalities are identified.            Assessment/Plan:  I anticipate this patient will require at least two midnights for appropriate medical management, necessitating inpatient admission.    * Atrial fibrillation with rapid ventricular response (HCC)- (present on admission)   Assessment & Plan    Recently had decrease in his home metoprolol from 100 mg twice daily to 25 mg twice daily.  Denies any recent dehydration or volume loss.  Diltiazem drip.  Cardiology consulted.  Monitor on telemetry  Correct all electrolyte abnormalities.  Check magnesium and TSH levels  Had recent echocardiogram with EF of 40%  He is normally on anticoagulation with Xarelto as well as being on aspirin.  We will hold  aspirin for now        Syncope   Assessment & Plan    Likely due to atrial fibrillation with rapid ventricular rate.  Check orthostatic blood pressures once heart rate has stabilized        Dizziness- (present on admission)   Assessment & Plan    Due to rapid ventricular rate and atrial fibrillation.        HLD (hyperlipidemia)- (present on admission)   Assessment & Plan    Continue with pravastatin for ongoing active treatment        Hypertension- (present on admission)   Assessment & Plan    Monitor vitals.  Continue with outpatient lisinopril I have increased his metoprolol to 50 mg.  Holding outpatient HCTZ        Type 2 diabetes mellitus (HCC)- (present on admission)   Assessment & Plan    Monitor Accu-Cheks and cover with sliding scale insulin.  As patient has not received any contrast I will continue his home metformin.  Diabetic diet        Obesity- (present on admission)   Assessment & Plan    History of morbid obesity.  Patient states there is been over 200 pounds with diet and exercise over the last few years.  Needs ongoing continued lifestyle management to get to a ideal body weight.        Euvolemic Hyponatremia- (present on admission)   Assessment & Plan    Gentle IV fluids monitor daily BMP  Likely adverse effect of HCTZ            VTE prophylaxis: On Xarelto

## 2018-07-19 NOTE — ED NOTES
"Med rec complete per pt at bedside   Pt states \"nothing changed since the last I was here\"  Historical med rec done 07/09/18  Allergies reviewed    "

## 2018-07-19 NOTE — PROGRESS NOTES
Assumed care of patient, bedside report received from Betty. Updated on POC, call light within reach and fall precautions in place. Bed locked and in lowest position. Patient instructed to call for assistance before getting out of bed. All questions answered, no other needs at this time.

## 2018-07-19 NOTE — CONSULTS
Reason for Consult:  Asked by Dr Colon to see this patient with atrial fibrillation  Patient's PCP: Jerad Lomax M.D.    CC:   Chief Complaint   Patient presents with   • Atrial Fibrillation   • Palpitations       HPI: Patient is a 53 year old male with past medical history of paroxysmal atrial fibrillation, hypertension, hyperlipidemia and type 2 diabetes mellitus who presented to the ED after he had a syncopal episode. He reported feeling lightheaded this morning as he stepped out of his house to water his plants and passed out soon after that. When he woke up, he had chest pain along with palpitations. He also felt short of breath at that time. He does not know how long he was out, but thinks it could have been 20 mins. When he arrived to the ED, he was found to be in atrial fibrillation with RVR which was not controlled with adenosine. He was then started on a dilt drip with rate control.     Patient has been taking metoprolol 25 mg BID at home, he states that it was reduced from 100 mg BID during his recent admission as he was found to be orthostatics positive. He is on xarelto for anticoagulation..          Medications / Drug list prior to admission:  No current facility-administered medications on file prior to encounter.      Current Outpatient Prescriptions on File Prior to Encounter   Medication Sig Dispense Refill   • hydroCHLOROthiazide (HYDRODIURIL) 25 MG Tab Take 1 Tab by mouth every day. 30 Tab 1   • rivaroxaban (XARELTO) 20 MG Tab tablet Take 1 Tab by mouth with dinner. 30 Tab 0   • famotidine (PEPCID) 20 MG Tab Take 1 Tab by mouth 2 times a day. 60 Tab 3   • aspirin 81 MG EC tablet Take 1 Tab by mouth every day. 30 Tab 3   • metFORMIN (GLUCOPHAGE) 500 MG Tab Take 2 Tabs by mouth 2 times a day, with meals. 60 Tab 0   • pravastatin (PRAVACHOL) 20 MG Tab Take 1 Tab by mouth every evening. 30 Tab 11   • lisinopril (PRINIVIL, ZESTRIL) 40 MG tablet Take 1 Tab by mouth every day. 30 Tab 3   • metoprolol  (LOPRESSOR) 100 MG Tab Take 1 Tab by mouth 2 times a day. 135 Tab 3   • acetaminophen (TYLENOL) 325 MG Tab Take 2 Tabs by mouth every 6 hours as needed (Mild Pain; (Pain scale 1-3); Temp greater than 100.5 F). 30 Tab 0       Current list of administered Medications:    Current Facility-Administered Medications:   •  DILTIAZem (CARDIZEM) 100 mg in D5W 100 mL Infusion, 15 mg/hr, Intravenous, Continuous, Wicho Colon D.O., Last Rate: 15 mL/hr at 07/19/18 1342, 15 mg/hr at 07/19/18 1342  •  famotidine (PEPCID) tablet 20 mg, 20 mg, Oral, BID, Wicho Colon D.O.  •  [START ON 7/20/2018] lisinopril (PRINIVIL) tablet 40 mg, 40 mg, Oral, DAILY, Wicho Colon D.O.  •  metFORMIN (GLUCOPHAGE) tablet 1,000 mg, 1,000 mg, Oral, BID WITH MEALS, Wicho Colon D.O.  •  metoprolol (LOPRESSOR) tablet 50 mg, 50 mg, Oral, BID, JONNY UrrutiaO.  •  pravastatin (PRAVACHOL) tablet 20 mg, 20 mg, Oral, Q EVENING, Wicho Colon D.O.  •  rivaroxaban (XARELTO) tablet 20 mg, 20 mg, Oral, PM MEAL, Wicho Colon D.O.  •  senna-docusate (PERICOLACE or SENOKOT S) 8.6-50 MG per tablet 2 Tab, 2 Tab, Oral, BID **AND** polyethylene glycol/lytes (MIRALAX) PACKET 1 Packet, 1 Packet, Oral, QDAY PRN **AND** magnesium hydroxide (MILK OF MAGNESIA) suspension 30 mL, 30 mL, Oral, QDAY PRN **AND** bisacodyl (DULCOLAX) suppository 10 mg, 10 mg, Rectal, QDAY PRN, JONNY UrrutiaO.  •  acetaminophen (TYLENOL) tablet 650 mg, 650 mg, Oral, Q6HRS PRN, JONNY UrrutiaO.  •  ondansetron (ZOFRAN) syringe/vial injection 4 mg, 4 mg, Intravenous, Q4HRS PRN, JONNY UrrutiaO.  •  ondansetron (ZOFRAN ODT) dispertab 4 mg, 4 mg, Oral, Q4HRS PRN, JONNY UrrutiaO.  •  promethazine (PHENERGAN) tablet 12.5-25 mg, 12.5-25 mg, Oral, Q4HRS PRN, JONNY UrrutiaO.  •  promethazine (PHENERGAN) suppository 12.5-25 mg, 12.5-25 mg, Rectal, Q4HRS PRN, JONNY UrrutiaO.  •  prochlorperazine (COMPAZINE) injection 5-10 mg, 5-10 mg, Intravenous, Q4HRS PRN,  JONNY UrrutiaO.  •  insulin regular (HUMULIN R) injection 2-9 Units, 2-9 Units, Subcutaneous, 4X/DAY ACHS **AND** Accu-Chek ACHS, , , Q AC AND BEDTIME(S) **AND** NOTIFY MD and PharmD, , , Once **AND** glucose 4 g chewable tablet 16 g, 16 g, Oral, Q15 MIN PRN **AND** dextrose 50% (D50W) injection 25 mL, 25 mL, Intravenous, Q15 MIN PRN, Wicho Colon D.O.    Past Medical History:   Diagnosis Date   • A-fib (HCC)    • Chest pain    • Diabetes (HCC)    • Diabetic neuropathy (HCC)    • Hypercholesterolemia    • Hypertension    • Morbid obesity (HCC)    • Noncompliance    • Normal cardiac stress test        Past Surgical History:   Procedure Laterality Date   • IRRIGATION & DEBRIDEMENT ORTHO Right 2/19/2018    Procedure: IRRIGATION & DEBRIDEMENT ORTHO-FOOT;  Surgeon: Kirby Lopez M.D.;  Location: SURGERY [facility];  Service: Orthopedics   • TOE AMPUTATION Right 1/17/2018    Procedure: TOE AMPUTATION-1ST RAY;  Surgeon: Doug Delong M.D.;  Location: SURGERY [facility];  Service: Orthopedics   • TOE AMPUTATION Right 11/10/2017    Procedure: TOE AMPUTATION;  Surgeon: Osorio Leo M.D.;  Location: SURGERY [facility];  Service: Orthopedics       No family history on file.    Social History     Social History   • Marital status:      Spouse name: N/A   • Number of children: N/A   • Years of education: N/A     Occupational History   • Not on file.     Social History Main Topics   • Smoking status: Never Smoker   • Smokeless tobacco: Never Used   • Alcohol use No   • Drug use: No   • Sexual activity: Not on file     Other Topics Concern   • Not on file     Social History Narrative   • No narrative on file       ALLERGIES:  Allergies   Allergen Reactions   • Daptomycin Rash     Body rash  RXN=1/2018     • Pcn [Penicillins] Hives and Rash      Rash & hives  RXN=since a kid   • Unasyn [Ampicillin-Sulbactam Sodium] Hives, Itching and Swelling   • Vancomycin Rash     Red man syndrome        Review of systems:  A detailed review of symptoms was reviewed with patient. This is reviewed in H&P and PMH. ALL OTHERS reviewed and negative    Physical exam:  Patient Vitals for the past 24 hrs:   BP Temp Pulse Resp SpO2 Height Weight   07/19/18 1537 121/81 36.7 °C (98 °F) 68 16 96 % 1.829 m (6') 119.8 kg (264 lb 1.8 oz)   07/19/18 1400 - - 90 - 93 % - -   07/19/18 1330 - - 85 - 97 % - -   07/19/18 1315 - - 88 - 93 % - -   07/19/18 1312 - - 84 - 96 % - -   07/19/18 1245 - - 88 - 97 % - -   07/19/18 1230 - - (!) 109 - 97 % - -   07/19/18 1215 - - (!) 103 - 92 % - -   07/19/18 1149 - - 94 - 95 % - -   07/19/18 1133 116/72 - (!) 110 - - - -   07/19/18 1132 - - 86 - 95 % - -   07/19/18 1116 - - 77 - 96 % - -   07/19/18 1115 - - (!) 104 - 98 % - -   07/19/18 1100 - - (!) 120 - 98 % - -   07/19/18 1058 - - (!) 112 - 95 % - -   07/19/18 1055 - - (!) 124 - 97 % - -   07/19/18 1053 - - 66 - 93 % - -   07/19/18 1051 - - (!) 123 - 96 % - -   07/19/18 1044 - - (!) 109 - 97 % - -   07/19/18 1040 - - (!) 134 - 96 % - -   07/19/18 1039 - - (!) 125 - 96 % - -   07/19/18 1010 - 36.8 °C (98.3 °F) - - - 1.829 m (6') 115.7 kg (255 lb)       General: Obese male, awake, alert, not in any distress  HEENT: normocephalic, atraumatic, PERRLA, OP clear   Neck: No bruits No JVD.   CVS: Irregular rhythm, normal rate, S1, S2 normal. No murmurs, rubs or gallops  Resp: CTAB. No wheezing or crackles/rhonchi.  Abdomen: Soft, NT, ND,  Skin: Grossly nothing acute no obvious rashes  Neurological: Alert, Moves all extremities, no cranial nerve defects on limited exam  Extremities: Pulse 2+ in b/l LE. No edema. No cyanosis.       Data:  Laboratory studies:  Recent Results (from the past 24 hour(s))   EKG (ER)    Collection Time: 07/19/18 10:09 AM   Result Value Ref Range    Report       [facility] Emergency Dept.    Test Date:  2018-07-19  Pt Name:    EMELY FLORES                Department: ER  MRN:        [id number]                       Room:       [facility]  Gender:     Male                         Technician: [id number]  :        1965                   Requested By:ER TRIAGE PROTOCOL  Order #:    [id number]                    Reading MD:    Measurements  Intervals                                Axis  Rate:       163                          P:  WA:                                      QRS:        -37  QRSD:       96                           T:          65  QT:         292  QTc:        481    Interpretive Statements  ATRIAL FIBRILLATION, V-RATE 140-188  LOW VOLTAGE IN FRONTAL LEADS  BORDERLINE R WAVE PROGRESSION, ANTERIOR LEADS  Compared to ECG 2018 12:32:34  Low QRS voltage now present     Troponin    Collection Time: 18 10:13 AM   Result Value Ref Range    Troponin I <0.01 0.00 - 0.04 ng/mL   Btype Natriuretic Peptide    Collection Time: 18 10:13 AM   Result Value Ref Range    B Natriuretic Peptide 114 (H) 0 - 100 pg/mL   CBC with Differential    Collection Time: 18 10:13 AM   Result Value Ref Range    WBC 13.7 (H) 4.8 - 10.8 K/uL    RBC 5.86 4.70 - 6.10 M/uL    Hemoglobin 16.3 14.0 - 18.0 g/dL    Hematocrit 47.8 42.0 - 52.0 %    MCV 81.6 81.4 - 97.8 fL    MCH 27.8 27.0 - 33.0 pg    MCHC 34.1 33.7 - 35.3 g/dL    RDW 39.8 35.9 - 50.0 fL    Platelet Count 250 164 - 446 K/uL    MPV 11.7 9.0 - 12.9 fL    Neutrophils-Polys 61.90 44.00 - 72.00 %    Lymphocytes 28.60 22.00 - 41.00 %    Monocytes 6.90 0.00 - 13.40 %    Eosinophils 1.80 0.00 - 6.90 %    Basophils 0.40 0.00 - 1.80 %    Immature Granulocytes 0.40 0.00 - 0.90 %    Nucleated RBC 0.00 /100 WBC    Neutrophils (Absolute) 8.49 (H) 1.82 - 7.42 K/uL    Lymphs (Absolute) 3.93 1.00 - 4.80 K/uL    Monos (Absolute) 0.95 (H) 0.00 - 0.85 K/uL    Eos (Absolute) 0.25 0.00 - 0.51 K/uL    Baso (Absolute) 0.05 0.00 - 0.12 K/uL    Immature Granulocytes (abs) 0.06 0.00 - 0.11 K/uL    NRBC (Absolute) 0.00 K/uL   Complete Metabolic Panel (CMP)    Collection Time: 18 10:13  AM   Result Value Ref Range    Sodium 134 (L) 135 - 145 mmol/L    Potassium 4.0 3.6 - 5.5 mmol/L    Chloride 102 96 - 112 mmol/L    Co2 21 20 - 33 mmol/L    Anion Gap 11.0 0.0 - 11.9    Glucose 232 (H) 65 - 99 mg/dL    Bun 43 (H) 8 - 22 mg/dL    Creatinine 0.93 0.50 - 1.40 mg/dL    Calcium 9.9 8.5 - 10.5 mg/dL    AST(SGOT) 26 12 - 45 U/L    ALT(SGPT) 31 2 - 50 U/L    Alkaline Phosphatase 90 30 - 99 U/L    Total Bilirubin 0.8 0.1 - 1.5 mg/dL    Albumin 4.1 3.2 - 4.9 g/dL    Total Protein 8.0 6.0 - 8.2 g/dL    Globulin 3.9 (H) 1.9 - 3.5 g/dL    A-G Ratio 1.1 g/dL   Lipase    Collection Time: 18 10:13 AM   Result Value Ref Range    Lipase 26 11 - 82 U/L   ESTIMATED GFR    Collection Time: 18 10:13 AM   Result Value Ref Range    GFR If African American >60 >60 mL/min/1.73 m 2    GFR If Non African American >60 >60 mL/min/1.73 m 2   TSH    Collection Time: 18 10:13 AM   Result Value Ref Range    TSH 1.190 0.380 - 5.330 uIU/mL   Free Thyroxine (T4)    Collection Time: 18 10:13 AM   Result Value Ref Range    Free T-4 1.34 0.53 - 1.43 ng/dL   MAGNESIUM    Collection Time: 18 10:13 AM   Result Value Ref Range    Magnesium 1.9 1.5 - 2.5 mg/dL   PROTHROMBIN TIME    Collection Time: 18 10:47 AM   Result Value Ref Range    PT 14.3 12.0 - 14.6 sec    INR 1.14 (H) 0.87 - 1.13   APTT    Collection Time: 18 10:47 AM   Result Value Ref Range    APTT <20.0 (L) 24.7 - 36.0 sec   EKG (NOW)    Collection Time: 18 10:58 AM   Result Value Ref Range    Report       [facility] Emergency Dept.    Test Date:  2018  Pt Name:    EMELY FLORES                Department:   MRN:        [id number]                      Room:        12  Gender:     Male                         Technician: [id number]  :        1965                   Requested By:BAILEY CHAVEZ  Order #:    [id number]                    Reading MD:    Measurements  Intervals                                 Axis  Rate:       94                           P:  GA:                                      QRS:        18  QRSD:       94                           T:          57  QT:         348  QTc:        436    Interpretive Statements  ATRIAL FIBRILLATION, V-RATE    LOW VOLTAGE IN FRONTAL LEADS  BORDERLINE R WAVE PROGRESSION, ANTERIOR LEADS  Compared to ECG 07/19/2018 10:09:51  No significant changes         Imaging:  CT-HEAD W/O   Final Result         1. No acute intracranial abnormality. No evidence of acute intracranial hemorrhage or mass lesion.               DX-CHEST-LIMITED (1 VIEW)   Final Result         No acute cardiopulmonary abnormalities are identified.              EKG :   Atrial fibrillation with ventricular rate ~ 90. Poor R wave progression    All pertinent features of laboratory and imaging reviewed including primary images where applicable    Assessment / Plan:    Atrial fibrillation with RVR  Syncope with chest pain  53 year old male with history of atrial fibrillation diagnosed in January presents with second episode of RVR in 2 weeks.CHADsVasc- 2. He also reportedly had a syncopal episode with chest discomfort. Initial troponin normal. He had a stress test in jan which was normal. Echo from 07/09 shows EF of 40-45%    Continue diltiazem drip and oral metoprolol for rate control  On xarelto for anticoagulation  Trend trop  Monitor overnight on telemetry for events like AV block or ventricular arrhythmias that could explain the syncope.  If there are no significant events on telemetry , patient may need a heart cath to r/o CAD. Will keep patient NPO at midnight.    Thank you for allowing us to participate in the care of this patient.

## 2018-07-19 NOTE — ASSESSMENT & PLAN NOTE
History of morbid obesity.  Patient states there is been over 200 pounds with diet and exercise over the last few years.  Needs ongoing continued lifestyle management to get to a ideal body weight.

## 2018-07-19 NOTE — CARE PLAN
Problem: Safety  Goal: Will remain free from injury  Outcome: PROGRESSING AS EXPECTED      Problem: Infection  Goal: Will remain free from infection  Outcome: PROGRESSING AS EXPECTED      Problem: Bowel/Gastric:  Goal: Normal bowel function is maintained or improved  Outcome: PROGRESSING AS EXPECTED      Problem: Mobility  Goal: Risk for activity intolerance will decrease  Outcome: PROGRESSING AS EXPECTED

## 2018-07-19 NOTE — ED TRIAGE NOTES
Sebastián Castro  Pt bib EMS. Pt changed into gown and placed on monitor.     Chief Complaint   Patient presents with   • Atrial Fibrillation   • Palpitations     Per EMS, pt c/o dizziness prior to GLF. -LOC Pt states he was recently discharged from here and diagnosed with rapid A-fib. Pt states today he began having similar s/s, including palpitations. Pt at 148 bpm on monitor at this time. EKG completed at bedside.  Chart up and ready for ERP now.

## 2018-07-19 NOTE — DISCHARGE PLANNING
We tried reaching patient the last 3 days to scheduled Home Health from previous referral.   If Home Health is still needed please send a new referral.     Thank you

## 2018-07-19 NOTE — ED PROVIDER NOTES
"ED Provider Note    Scribed for Meng Rivera M.D. by Meng Rivera. 7/19/2018,  10:24 AM.    CHIEF COMPLAINT  Chief Complaint   Patient presents with   • Atrial Fibrillation   • Palpitations       HPI  Sebastián Castro is a 53 y.o. male with a history of atrial fibrillation, diabetes, neuropathy, high cholesterol, hypertension, obesity, and medication noncompliance who presents to the Emergency Department complaining of dizziness and palpitations with an associated ground-level fall.  The patient reports a history of rapid atrial fibrillation with a recent evaluation here.  He says that today he started having similar symptoms with palpitations.  He said he walked outside to water his plants, started feeling lightheaded, and thinks he must of passed out.  He says that he woke up lying down, and now has \"a wicked headache.\"  He points to the left frontal area, and there being a be a subtle contusion, but no skin breakdown.  He reports associated chest tightness/pain which is mild, but persistent, and nonradiating.  He reports compliance with his metoprolol, but says that a couple of days ago his dose was reduced from 100 mg to 25 mg because of orthostatic hypotension.    His records show that he is meant to be taking HCTZ, Xarelto, metformin, statin, lisinopril, metoprolol, and aspirin.    REVIEW OF SYSTEMS  See HPI for further details. All other systems are negative.     PAST MEDICAL HISTORY   has a past medical history of A-fib (HCC); Chest pain; Diabetes (HCC); Diabetic neuropathy (HCC); Hypercholesterolemia; Hypertension; Morbid obesity (HCC); Noncompliance; and Normal cardiac stress test.    SOCIAL HISTORY  Social History     Social History Main Topics   • Smoking status: Never Smoker   • Smokeless tobacco: Never Used   • Alcohol use No   • Drug use: No   • Sexual activity: Not on file     History   Drug Use No       SURGICAL HISTORY   has a past surgical history that includes toe amputation (Right, " 11/10/2017); toe amputation (Right, 1/17/2018); and irrigation & debridement ortho (Right, 2/19/2018).    CURRENT MEDICATIONS  Home Medications     Reviewed by Maria Fernanda Heart, Pharmacy Intern (Pharmacy Intern) on 07/19/18 at 1143  Med List Status: Complete   Medication Last Dose Status   acetaminophen (TYLENOL) 325 MG Tab 7/18/2018 Active   aspirin 81 MG EC tablet 7/19/2018 Active   famotidine (PEPCID) 20 MG Tab 7/19/2018 Active   hydroCHLOROthiazide (HYDRODIURIL) 25 MG Tab 7/19/2018 Active   lisinopril (PRINIVIL, ZESTRIL) 40 MG tablet 7/19/2018 Active   metFORMIN (GLUCOPHAGE) 500 MG Tab 7/19/2018 Active   metoprolol (LOPRESSOR) 100 MG Tab 7/19/2018 Active   pravastatin (PRAVACHOL) 20 MG Tab 7/18/2018 Active   rivaroxaban (XARELTO) 20 MG Tab tablet 7/18/2018 Active                ALLERGIES  Allergies   Allergen Reactions   • Daptomycin Rash     Body rash  RXN=1/2018     • Pcn [Penicillins] Hives and Rash      Rash & hives  RXN=since a kid   • Unasyn [Ampicillin-Sulbactam Sodium] Hives, Itching and Swelling   • Vancomycin Rash     Red man syndrome       PHYSICAL EXAM  VITAL SIGNS: /81   Pulse 68   Temp 36.7 °C (98 °F)   Resp 16   Ht 1.829 m (6')   Wt 119.8 kg (264 lb 1.8 oz)   SpO2 96%   BMI 35.82 kg/m²   Pulse ox interpretation: I interpret this pulse ox as normal.  Constitutional: Alert in no apparent distress.  Obese, anxious.  HENT: No signs of trauma, Bilateral external ears normal, Nose normal.   Eyes: Conjunctiva normal, Non-icteric.   Neck: Normal range of motion, Supple, No stridor.   Lymphatic: No lymphadenopathy noted.   Cardiovascular: Irregularly irregular and extremely rapid rate and rhythm, no murmurs.   Thorax & Lungs: Normal breath sounds, No respiratory distress, No wheezing, No chest tenderness.   Abdomen: Bowel sounds normal, Soft, No tenderness, No masses, No pulsatile masses. No peritoneal signs.  Skin: Warm, Dry, No erythema, No rash.   Extremities: Intact distal pulses,  No  cyanosis.  Musculoskeletal: Good range of motion in all major joints. No or major deformities noted.   Neurologic: Alert , Normal motor function, Normal sensory function, No focal deficits noted.   Psychiatric: Affect normal, Judgment normal, Mood normal.     DIAGNOSTIC STUDIES / PROCEDURES    EKG  Interpreted by me    Rhythm: afib with RVR  Rate: 166  Axis: normal  Intervals: normal  Ectopy: none  Conduction: normal  ST Segments: no acute change  T Waves: no acute change  Q Waves: none  Old EKGs show history of similar episodes, and no ischemic changes.    LABS  Labs Reviewed   BTYPE NATRIURETIC PEPTIDE - Abnormal; Notable for the following:        Result Value    B Natriuretic Peptide 114 (*)     All other components within normal limits    Narrative:     Indicate which anticoagulants the patient is on:->UNKNOWN   CBC WITH DIFFERENTIAL - Abnormal; Notable for the following:     WBC 13.7 (*)     Neutrophils (Absolute) 8.49 (*)     Monos (Absolute) 0.95 (*)     All other components within normal limits    Narrative:     Indicate which anticoagulants the patient is on:->UNKNOWN   COMP METABOLIC PANEL - Abnormal; Notable for the following:     Sodium 134 (*)     Glucose 232 (*)     Bun 43 (*)     Globulin 3.9 (*)     All other components within normal limits    Narrative:     Indicate which anticoagulants the patient is on:->UNKNOWN   PROTHROMBIN TIME - Abnormal; Notable for the following:     INR 1.14 (*)     All other components within normal limits    Narrative:     SPECIMEN IS A RECOLLECT. QNS   APTT - Abnormal; Notable for the following:     APTT <20.0 (*)     All other components within normal limits    Narrative:     SPECIMEN IS A RECOLLECT. QNS   TROPONIN    Narrative:     Indicate which anticoagulants the patient is on:->UNKNOWN   LIPASE    Narrative:     Indicate which anticoagulants the patient is on:->UNKNOWN   ESTIMATED GFR    Narrative:     Indicate which anticoagulants the patient is on:->UNKNOWN   TSH     Narrative:     If not done in the ER   FREE THYROXINE    Narrative:     If not done in the ER   MAGNESIUM    Narrative:     Indicate which anticoagulants the patient is on:->UNKNOWN   TROPONIN     All labs reviewed by me.    RADIOLOGY  CT-HEAD W/O   Final Result         1. No acute intracranial abnormality. No evidence of acute intracranial hemorrhage or mass lesion.               DX-CHEST-LIMITED (1 VIEW)   Final Result         No acute cardiopulmonary abnormalities are identified.        The radiologist's interpretation of all radiological studies have been reviewed by me.    Cardioversion Procedure Note    Indication: atrial fibrillation    Consent: The patient provided verbal consent for this procedure.    Pre-Medication: none    Procedure: The patient was placed in the supine position and the chest area was exposed. The cardioversion pads were applied in the standard manner and configuration.    Attempt #1: Adenosine 6mg unsuccessful.unsuccessful.    Attempt #2: Adenosine 12mg unsuccessful.unsuccessful.    Attempt #3: Adenosine 12mg unsuccessful.unsuccessful.    Attempt #4: Diltiazem 25mg bolus improved heart rate to the 90s, followed by a drip.    The patient tolerated the procedure well.    Complications: None      COURSE & MEDICAL DECISION MAKING  Nursing notes, VS, PMSFHx reviewed in chart.     10:24 AM Patient seen and examined at bedside. Differential diagnosis includes but is not limited to A. fib with RVR, medication noncompliance, inadequate rate control secondary to reduced dose of metoprolol, ACS, closed head injury on Xarelto. Ordered for head CT after EKG and rate control have been established.  Labs to exclude ACS and a chest x-ray have also been ordered.  Patient will be treated with chemical cardioversion for his symptoms.     11:09 AM This patient now has a heart rate in the 90s.  As above, adenosine failed 3 trials, but diltiazem was very successful.  He continues to have some chest pain  but the palpitations have resolved.    11:27 AM this patient's initial troponin is negative.  His BNP is slightly elevated.  His head CT is pending.    After his negative head CT, with unremarkable initial laboratory tests, and her heart rate improved to atrial fibrillation with a rate below 100, the patient was admitted to the hospitalist service.    DISPOSITION:  Patient will be admitted to Dr. Lazo in guarded condition.      FINAL IMPRESSION  1. Afib with RVR  2.  Chest pain    Critical Care: The total critical care time on this patient is 30 minutes, resuscitating patient, speaking with admitting physician, and deciphering test results. This 30 minutes is exclusive of separately billable procedures.

## 2018-07-20 ENCOUNTER — APPOINTMENT (OUTPATIENT)
Dept: RADIOLOGY | Facility: MEDICAL CENTER | Age: 53
DRG: 286 | End: 2018-07-20
Attending: STUDENT IN AN ORGANIZED HEALTH CARE EDUCATION/TRAINING PROGRAM
Payer: MEDICAID

## 2018-07-20 PROBLEM — R07.9 CHEST PAIN: Status: ACTIVE | Noted: 2018-07-20

## 2018-07-20 LAB
ALBUMIN SERPL BCP-MCNC: 3.9 G/DL (ref 3.2–4.9)
ALBUMIN/GLOB SERPL: 1.1 G/DL
ALP SERPL-CCNC: 78 U/L (ref 30–99)
ALT SERPL-CCNC: 32 U/L (ref 2–50)
ANION GAP SERPL CALC-SCNC: 10 MMOL/L (ref 0–11.9)
ANION GAP SERPL CALC-SCNC: 7 MMOL/L (ref 0–11.9)
APTT PPP: 32.1 SEC (ref 24.7–36)
AST SERPL-CCNC: 25 U/L (ref 12–45)
BILIRUB SERPL-MCNC: 1 MG/DL (ref 0.1–1.5)
BUN SERPL-MCNC: 33 MG/DL (ref 8–22)
BUN SERPL-MCNC: 36 MG/DL (ref 8–22)
CALCIUM SERPL-MCNC: 9.5 MG/DL (ref 8.5–10.5)
CALCIUM SERPL-MCNC: 9.6 MG/DL (ref 8.5–10.5)
CHLORIDE SERPL-SCNC: 103 MMOL/L (ref 96–112)
CHLORIDE SERPL-SCNC: 104 MMOL/L (ref 96–112)
CO2 SERPL-SCNC: 20 MMOL/L (ref 20–33)
CO2 SERPL-SCNC: 27 MMOL/L (ref 20–33)
CREAT SERPL-MCNC: 0.87 MG/DL (ref 0.5–1.4)
CREAT SERPL-MCNC: 0.9 MG/DL (ref 0.5–1.4)
ERYTHROCYTE [DISTWIDTH] IN BLOOD BY AUTOMATED COUNT: 42.4 FL (ref 35.9–50)
ERYTHROCYTE [DISTWIDTH] IN BLOOD BY AUTOMATED COUNT: 42.6 FL (ref 35.9–50)
GLOBULIN SER CALC-MCNC: 3.6 G/DL (ref 1.9–3.5)
GLUCOSE BLD-MCNC: 116 MG/DL (ref 65–99)
GLUCOSE BLD-MCNC: 128 MG/DL (ref 65–99)
GLUCOSE BLD-MCNC: 146 MG/DL (ref 65–99)
GLUCOSE BLD-MCNC: 156 MG/DL (ref 65–99)
GLUCOSE SERPL-MCNC: 135 MG/DL (ref 65–99)
GLUCOSE SERPL-MCNC: 139 MG/DL (ref 65–99)
HCT VFR BLD AUTO: 45.4 % (ref 42–52)
HCT VFR BLD AUTO: 50.8 % (ref 42–52)
HGB BLD-MCNC: 14.6 G/DL (ref 14–18)
HGB BLD-MCNC: 16.4 G/DL (ref 14–18)
INR PPP: 1.61 (ref 0.87–1.13)
MCH RBC QN AUTO: 27.4 PG (ref 27–33)
MCH RBC QN AUTO: 27.7 PG (ref 27–33)
MCHC RBC AUTO-ENTMCNC: 32.2 G/DL (ref 33.7–35.3)
MCHC RBC AUTO-ENTMCNC: 32.3 G/DL (ref 33.7–35.3)
MCV RBC AUTO: 85.2 FL (ref 81.4–97.8)
MCV RBC AUTO: 86 FL (ref 81.4–97.8)
PLATELET # BLD AUTO: 222 K/UL (ref 164–446)
PLATELET # BLD AUTO: 242 K/UL (ref 164–446)
PMV BLD AUTO: 11.7 FL (ref 9–12.9)
PMV BLD AUTO: 11.8 FL (ref 9–12.9)
POTASSIUM SERPL-SCNC: 4.2 MMOL/L (ref 3.6–5.5)
POTASSIUM SERPL-SCNC: 4.7 MMOL/L (ref 3.6–5.5)
PROT SERPL-MCNC: 7.5 G/DL (ref 6–8.2)
PROTHROMBIN TIME: 18.8 SEC (ref 12–14.6)
RBC # BLD AUTO: 5.33 M/UL (ref 4.7–6.1)
RBC # BLD AUTO: 5.91 M/UL (ref 4.7–6.1)
SODIUM SERPL-SCNC: 134 MMOL/L (ref 135–145)
SODIUM SERPL-SCNC: 137 MMOL/L (ref 135–145)
WBC # BLD AUTO: 13.2 K/UL (ref 4.8–10.8)
WBC # BLD AUTO: 13.8 K/UL (ref 4.8–10.8)

## 2018-07-20 PROCEDURE — 700102 HCHG RX REV CODE 250 W/ 637 OVERRIDE(OP): Performed by: HOSPITALIST

## 2018-07-20 PROCEDURE — 80053 COMPREHEN METABOLIC PANEL: CPT

## 2018-07-20 PROCEDURE — 82962 GLUCOSE BLOOD TEST: CPT | Mod: 91

## 2018-07-20 PROCEDURE — 36415 COLL VENOUS BLD VENIPUNCTURE: CPT

## 2018-07-20 PROCEDURE — 770020 HCHG ROOM/CARE - TELE (206)

## 2018-07-20 PROCEDURE — 80048 BASIC METABOLIC PNL TOTAL CA: CPT

## 2018-07-20 PROCEDURE — 700102 HCHG RX REV CODE 250 W/ 637 OVERRIDE(OP): Performed by: STUDENT IN AN ORGANIZED HEALTH CARE EDUCATION/TRAINING PROGRAM

## 2018-07-20 PROCEDURE — 85027 COMPLETE CBC AUTOMATED: CPT

## 2018-07-20 PROCEDURE — 700105 HCHG RX REV CODE 258: Performed by: STUDENT IN AN ORGANIZED HEALTH CARE EDUCATION/TRAINING PROGRAM

## 2018-07-20 PROCEDURE — 99233 SBSQ HOSP IP/OBS HIGH 50: CPT | Performed by: INTERNAL MEDICINE

## 2018-07-20 PROCEDURE — 85610 PROTHROMBIN TIME: CPT

## 2018-07-20 PROCEDURE — A9270 NON-COVERED ITEM OR SERVICE: HCPCS | Performed by: HOSPITALIST

## 2018-07-20 PROCEDURE — A9270 NON-COVERED ITEM OR SERVICE: HCPCS | Performed by: STUDENT IN AN ORGANIZED HEALTH CARE EDUCATION/TRAINING PROGRAM

## 2018-07-20 PROCEDURE — 85730 THROMBOPLASTIN TIME PARTIAL: CPT

## 2018-07-20 RX ORDER — LISINOPRIL 20 MG/1
20 TABLET ORAL DAILY
Status: DISCONTINUED | OUTPATIENT
Start: 2018-07-21 | End: 2018-07-21

## 2018-07-20 RX ORDER — SODIUM CHLORIDE 9 MG/ML
INJECTION, SOLUTION INTRAVENOUS CONTINUOUS
Status: DISCONTINUED | OUTPATIENT
Start: 2018-07-20 | End: 2018-07-21

## 2018-07-20 RX ORDER — METOPROLOL TARTRATE 50 MG/1
50 TABLET, FILM COATED ORAL EVERY 8 HOURS
Status: DISCONTINUED | OUTPATIENT
Start: 2018-07-20 | End: 2018-07-21

## 2018-07-20 RX ADMIN — LISINOPRIL 40 MG: 20 TABLET ORAL at 05:51

## 2018-07-20 RX ADMIN — METOPROLOL TARTRATE 50 MG: 50 TABLET ORAL at 05:51

## 2018-07-20 RX ADMIN — METFORMIN HYDROCHLORIDE 1000 MG: 500 TABLET ORAL at 05:50

## 2018-07-20 RX ADMIN — FAMOTIDINE 20 MG: 20 TABLET ORAL at 05:51

## 2018-07-20 RX ADMIN — PRAVASTATIN SODIUM 20 MG: 20 TABLET ORAL at 17:26

## 2018-07-20 RX ADMIN — METOPROLOL TARTRATE 50 MG: 50 TABLET ORAL at 21:54

## 2018-07-20 RX ADMIN — INSULIN HUMAN 2 UNITS: 100 INJECTION, SOLUTION PARENTERAL at 17:27

## 2018-07-20 RX ADMIN — ACETAMINOPHEN 650 MG: 325 TABLET, FILM COATED ORAL at 14:15

## 2018-07-20 RX ADMIN — ACETAMINOPHEN 650 MG: 325 TABLET, FILM COATED ORAL at 08:26

## 2018-07-20 RX ADMIN — METOPROLOL TARTRATE 50 MG: 50 TABLET ORAL at 14:15

## 2018-07-20 RX ADMIN — FAMOTIDINE 20 MG: 20 TABLET ORAL at 17:26

## 2018-07-20 RX ADMIN — SODIUM CHLORIDE: 9 INJECTION, SOLUTION INTRAVENOUS at 21:54

## 2018-07-20 ASSESSMENT — ENCOUNTER SYMPTOMS
BLURRED VISION: 0
HEARTBURN: 0
CONSTITUTIONAL NEGATIVE: 1
EYES NEGATIVE: 1
DIZZINESS: 1
FEVER: 0
BRUISES/BLEEDS EASILY: 0
PALPITATIONS: 1
DEPRESSION: 0
MYALGIAS: 0
COUGH: 0
RESPIRATORY NEGATIVE: 1
GASTROINTESTINAL NEGATIVE: 1
MUSCULOSKELETAL NEGATIVE: 1
PSYCHIATRIC NEGATIVE: 1

## 2018-07-20 ASSESSMENT — PAIN SCALES - GENERAL
PAINLEVEL_OUTOF10: 0
PAINLEVEL_OUTOF10: 3
PAINLEVEL_OUTOF10: 3

## 2018-07-20 ASSESSMENT — PATIENT HEALTH QUESTIONNAIRE - PHQ9
SUM OF ALL RESPONSES TO PHQ9 QUESTIONS 1 AND 2: 0
2. FEELING DOWN, DEPRESSED, IRRITABLE, OR HOPELESS: NOT AT ALL
1. LITTLE INTEREST OR PLEASURE IN DOING THINGS: NOT AT ALL

## 2018-07-20 NOTE — PROGRESS NOTES
Informed Dr. Mooney of patient's 3.1 second as well as 3 second pause. No new interventions/ orders at this time. Just continue to monitor patient.

## 2018-07-20 NOTE — PROGRESS NOTES
[facility]    Date of Service: 2018    Chief Complaint  53 y.o. male admitted 2018 with admittted with atrial fibrillation , syncope and chest pain    Interval Problem Update  No acute vent overnight     Consultants/Specialty  Cardiology     Disposition  TBD         Review of Systems   Constitutional: Negative.  Negative for fever.   HENT: Negative.  Negative for hearing loss.    Eyes: Negative.  Negative for blurred vision.   Respiratory: Negative.  Negative for cough.    Cardiovascular: Positive for palpitations.   Gastrointestinal: Negative.  Negative for heartburn.   Genitourinary: Negative.  Negative for dysuria.   Musculoskeletal: Negative.  Negative for myalgias.   Skin: Negative.  Negative for rash.   Neurological: Positive for dizziness.   Endo/Heme/Allergies: Does not bruise/bleed easily.   Psychiatric/Behavioral: Negative.  Negative for depression.      Physical Exam  Laboratory/Imaging   Hemodynamics  Temp (24hrs), Av.7 °C (98.1 °F), Min:36.4 °C (97.6 °F), Max:36.9 °C (98.4 °F)   Temperature: 36.6 °C (97.8 °F)  Pulse  Av.3  Min: 60  Max: 134 Heart Rate (Monitored): 92  Blood Pressure: 100/73, NIBP: 131/80      Respiratory      Respiration: 18, Pulse Oximetry: 95 %, O2 Daily Delivery Respiratory : Room Air with O2 Available             Fluids    Intake/Output Summary (Last 24 hours) at 18 1345  Last data filed at 18 1124   Gross per 24 hour   Intake                0 ml   Output              350 ml   Net             -350 ml       Nutrition  Orders Placed This Encounter   Procedures   • Diet Order Cardiac, Diabetic     Standing Status:   Standing     Number of Occurrences:   1     Order Specific Question:   Diet:     Answer:   Cardiac [6]     Order Specific Question:   Diet:     Answer:   Diabetic [3]   • DIET NPO     Standing Status:   Standing     Number of Occurrences:   8     Order Specific Question:   Restrict to:     Answer:   Sips with Medications  [3]     Physical Exam   Constitutional: He is oriented to person, place, and time. He appears well-developed and well-nourished.   HENT:   Head: Atraumatic.   Neck: Normal range of motion. Neck supple.   Cardiovascular: An irregularly irregular rhythm present.   Pulmonary/Chest: Effort normal and breath sounds normal.   Abdominal: Soft. Bowel sounds are normal.   Musculoskeletal: Normal range of motion.   Neurological: He is alert and oriented to person, place, and time.       Recent Labs      07/19/18   1013  07/20/18   0218  07/20/18   1021   WBC  13.7*  13.8*  13.2*   RBC  5.86  5.91  5.33   HEMOGLOBIN  16.3  16.4  14.6   HEMATOCRIT  47.8  50.8  45.4   MCV  81.6  86.0  85.2   MCH  27.8  27.7  27.4   MCHC  34.1  32.3*  32.2*   RDW  39.8  42.6  42.4   PLATELETCT  250  222  242   MPV  11.7  11.8  11.7     Recent Labs      07/19/18   1013  07/20/18   0218  07/20/18   1021   SODIUM  134*  134*  137   POTASSIUM  4.0  4.2  4.7   CHLORIDE  102  104  103   CO2  21  20  27   GLUCOSE  232*  135*  139*   BUN  43*  36*  33*   CREATININE  0.93  0.87  0.90   CALCIUM  9.9  9.5  9.6     Recent Labs      07/19/18   1047  07/20/18   1021   APTT  <20.0*  32.1   INR  1.14*  1.61*     Recent Labs      07/19/18   1013   BNPBTYPENAT  114*              Assessment/Plan     * Atrial fibrillation with rapid ventricular response (HCC)- (present on admission)   Assessment & Plan    Recently had decrease in his home metoprolol from 100 mg twice daily to 25 mg twice daily.  Denies any recent dehydration or volume loss.  On Diltiazem drip.  Cardiology on board  Monitor on telemetry  Correct all electrolyte abnormalities.  Check magnesium and TSH levels  Had recent echocardiogram with EF of 40%  He is normally on anticoagulation with Xarelto as well as being on aspirin.   - hold aspirin for now        Chest pain   Assessment & Plan    Probably due to afib , will defer to cardiologist for Guidance        Syncope   Assessment & Plan    Likely due  to atrial fibrillation with rapid ventricular rate.  Check orthostatic blood pressures once heart rate has stabilized        Dizziness- (present on admission)   Assessment & Plan    Due to rapid ventricular rate and atrial fibrillation.        HLD (hyperlipidemia)- (present on admission)   Assessment & Plan    Continue with pravastatin for ongoing active treatment        Hypertension- (present on admission)   Assessment & Plan    Monitor vitals.  Continue with outpatient lisinopril I have increased his metoprolol to 50 mg.  Holding outpatient HCTZ        Type 2 diabetes mellitus (HCC)- (present on admission)   Assessment & Plan    Monitor Accu-Cheks and cover with sliding scale insulin.    will hold  metformin.  Diabetic diet        Obesity- (present on admission)   Assessment & Plan    History of morbid obesity.  Patient states there is been over 200 pounds with diet and exercise over the last few years.  Needs ongoing continued lifestyle management to get to a ideal body weight.        Euvolemic Hyponatremia- (present on admission)   Assessment & Plan    Gentle IV fluids monitor daily BMP  Likely adverse effect of HCTZ          Quality-Core Measures   Reviewed items::  Medications reviewed, Labs reviewed and Radiology images reviewed  DVT: Xarelto.

## 2018-07-21 ENCOUNTER — APPOINTMENT (OUTPATIENT)
Dept: RADIOLOGY | Facility: MEDICAL CENTER | Age: 53
DRG: 286 | End: 2018-07-21
Attending: STUDENT IN AN ORGANIZED HEALTH CARE EDUCATION/TRAINING PROGRAM
Payer: MEDICAID

## 2018-07-21 PROBLEM — D72.829 LEUKOCYTOSIS: Status: ACTIVE | Noted: 2018-07-21

## 2018-07-21 LAB
ANION GAP SERPL CALC-SCNC: 8 MMOL/L (ref 0–11.9)
BASOPHILS # BLD AUTO: 0.5 % (ref 0–1.8)
BASOPHILS # BLD: 0.07 K/UL (ref 0–0.12)
BUN SERPL-MCNC: 27 MG/DL (ref 8–22)
CALCIUM SERPL-MCNC: 9.7 MG/DL (ref 8.5–10.5)
CHLORIDE SERPL-SCNC: 102 MMOL/L (ref 96–112)
CO2 SERPL-SCNC: 24 MMOL/L (ref 20–33)
CREAT SERPL-MCNC: 0.87 MG/DL (ref 0.5–1.4)
EOSINOPHIL # BLD AUTO: 0.5 K/UL (ref 0–0.51)
EOSINOPHIL NFR BLD: 3.9 % (ref 0–6.9)
ERYTHROCYTE [DISTWIDTH] IN BLOOD BY AUTOMATED COUNT: 41.3 FL (ref 35.9–50)
GLUCOSE BLD-MCNC: 120 MG/DL (ref 65–99)
GLUCOSE BLD-MCNC: 139 MG/DL (ref 65–99)
GLUCOSE BLD-MCNC: 142 MG/DL (ref 65–99)
GLUCOSE BLD-MCNC: 146 MG/DL (ref 65–99)
GLUCOSE SERPL-MCNC: 138 MG/DL (ref 65–99)
HCT VFR BLD AUTO: 46.1 % (ref 42–52)
HGB BLD-MCNC: 15.1 G/DL (ref 14–18)
IMM GRANULOCYTES # BLD AUTO: 0.1 K/UL (ref 0–0.11)
IMM GRANULOCYTES NFR BLD AUTO: 0.8 % (ref 0–0.9)
LYMPHOCYTES # BLD AUTO: 3.47 K/UL (ref 1–4.8)
LYMPHOCYTES NFR BLD: 26.9 % (ref 22–41)
MCH RBC QN AUTO: 27.3 PG (ref 27–33)
MCHC RBC AUTO-ENTMCNC: 32.8 G/DL (ref 33.7–35.3)
MCV RBC AUTO: 83.2 FL (ref 81.4–97.8)
MONOCYTES # BLD AUTO: 1.03 K/UL (ref 0–0.85)
MONOCYTES NFR BLD AUTO: 8 % (ref 0–13.4)
NEUTROPHILS # BLD AUTO: 7.73 K/UL (ref 1.82–7.42)
NEUTROPHILS NFR BLD: 59.9 % (ref 44–72)
NRBC # BLD AUTO: 0 K/UL
NRBC BLD-RTO: 0 /100 WBC
PLATELET # BLD AUTO: 212 K/UL (ref 164–446)
PMV BLD AUTO: 11.6 FL (ref 9–12.9)
POTASSIUM SERPL-SCNC: 4.8 MMOL/L (ref 3.6–5.5)
RBC # BLD AUTO: 5.54 M/UL (ref 4.7–6.1)
SODIUM SERPL-SCNC: 134 MMOL/L (ref 135–145)
WBC # BLD AUTO: 12.9 K/UL (ref 4.8–10.8)

## 2018-07-21 PROCEDURE — B2111ZZ FLUOROSCOPY OF MULTIPLE CORONARY ARTERIES USING LOW OSMOLAR CONTRAST: ICD-10-PCS | Performed by: INTERNAL MEDICINE

## 2018-07-21 PROCEDURE — 307093 HCHG TR BAND RADIAL

## 2018-07-21 PROCEDURE — 700102 HCHG RX REV CODE 250 W/ 637 OVERRIDE(OP): Performed by: HOSPITALIST

## 2018-07-21 PROCEDURE — A9270 NON-COVERED ITEM OR SERVICE: HCPCS | Performed by: INTERNAL MEDICINE

## 2018-07-21 PROCEDURE — 99152 MOD SED SAME PHYS/QHP 5/>YRS: CPT

## 2018-07-21 PROCEDURE — 82962 GLUCOSE BLOOD TEST: CPT | Mod: 91

## 2018-07-21 PROCEDURE — 4A023N7 MEASUREMENT OF CARDIAC SAMPLING AND PRESSURE, LEFT HEART, PERCUTANEOUS APPROACH: ICD-10-PCS | Performed by: INTERNAL MEDICINE

## 2018-07-21 PROCEDURE — 93458 L HRT ARTERY/VENTRICLE ANGIO: CPT

## 2018-07-21 PROCEDURE — C1769 GUIDE WIRE: HCPCS

## 2018-07-21 PROCEDURE — 700102 HCHG RX REV CODE 250 W/ 637 OVERRIDE(OP): Performed by: STUDENT IN AN ORGANIZED HEALTH CARE EDUCATION/TRAINING PROGRAM

## 2018-07-21 PROCEDURE — C1894 INTRO/SHEATH, NON-LASER: HCPCS

## 2018-07-21 PROCEDURE — 700105 HCHG RX REV CODE 258: Performed by: INTERNAL MEDICINE

## 2018-07-21 PROCEDURE — 770020 HCHG ROOM/CARE - TELE (206)

## 2018-07-21 PROCEDURE — 85025 COMPLETE CBC W/AUTO DIFF WBC: CPT

## 2018-07-21 PROCEDURE — 700102 HCHG RX REV CODE 250 W/ 637 OVERRIDE(OP): Performed by: INTERNAL MEDICINE

## 2018-07-21 PROCEDURE — 80048 BASIC METABOLIC PNL TOTAL CA: CPT

## 2018-07-21 PROCEDURE — A9270 NON-COVERED ITEM OR SERVICE: HCPCS | Performed by: HOSPITALIST

## 2018-07-21 PROCEDURE — 99233 SBSQ HOSP IP/OBS HIGH 50: CPT | Performed by: INTERNAL MEDICINE

## 2018-07-21 PROCEDURE — C1887 CATHETER, GUIDING: HCPCS

## 2018-07-21 PROCEDURE — 700117 HCHG RX CONTRAST REV CODE 255: Performed by: INTERNAL MEDICINE

## 2018-07-21 PROCEDURE — A9270 NON-COVERED ITEM OR SERVICE: HCPCS | Performed by: STUDENT IN AN ORGANIZED HEALTH CARE EDUCATION/TRAINING PROGRAM

## 2018-07-21 PROCEDURE — 700101 HCHG RX REV CODE 250

## 2018-07-21 PROCEDURE — 700111 HCHG RX REV CODE 636 W/ 250 OVERRIDE (IP)

## 2018-07-21 PROCEDURE — 36415 COLL VENOUS BLD VENIPUNCTURE: CPT

## 2018-07-21 PROCEDURE — 71046 X-RAY EXAM CHEST 2 VIEWS: CPT

## 2018-07-21 PROCEDURE — B2151ZZ FLUOROSCOPY OF LEFT HEART USING LOW OSMOLAR CONTRAST: ICD-10-PCS | Performed by: INTERNAL MEDICINE

## 2018-07-21 RX ORDER — LISINOPRIL 5 MG/1
10 TABLET ORAL DAILY
Status: DISCONTINUED | OUTPATIENT
Start: 2018-07-22 | End: 2018-07-22

## 2018-07-21 RX ORDER — ONDANSETRON 2 MG/ML
4 INJECTION INTRAMUSCULAR; INTRAVENOUS EVERY 4 HOURS PRN
Status: DISCONTINUED | OUTPATIENT
Start: 2018-07-21 | End: 2018-07-25 | Stop reason: HOSPADM

## 2018-07-21 RX ORDER — MIDAZOLAM HYDROCHLORIDE 1 MG/ML
INJECTION INTRAMUSCULAR; INTRAVENOUS
Status: COMPLETED
Start: 2018-07-21 | End: 2018-07-21

## 2018-07-21 RX ORDER — ASPIRIN 81 MG/1
81 TABLET, CHEWABLE ORAL DAILY
Status: DISCONTINUED | OUTPATIENT
Start: 2018-07-21 | End: 2018-07-25 | Stop reason: HOSPADM

## 2018-07-21 RX ORDER — HEPARIN SODIUM,PORCINE 1000/ML
VIAL (ML) INJECTION
Status: COMPLETED
Start: 2018-07-21 | End: 2018-07-21

## 2018-07-21 RX ORDER — ACETAMINOPHEN 325 MG/1
650 TABLET ORAL EVERY 6 HOURS PRN
Status: DISCONTINUED | OUTPATIENT
Start: 2018-07-21 | End: 2018-07-25 | Stop reason: HOSPADM

## 2018-07-21 RX ORDER — HALOPERIDOL 5 MG/ML
1 INJECTION INTRAMUSCULAR EVERY 6 HOURS PRN
Status: DISCONTINUED | OUTPATIENT
Start: 2018-07-21 | End: 2018-07-25 | Stop reason: HOSPADM

## 2018-07-21 RX ORDER — SCOLOPAMINE TRANSDERMAL SYSTEM 1 MG/1
1 PATCH, EXTENDED RELEASE TRANSDERMAL
Status: DISCONTINUED | OUTPATIENT
Start: 2018-07-21 | End: 2018-07-25 | Stop reason: HOSPADM

## 2018-07-21 RX ORDER — DIPHENHYDRAMINE HYDROCHLORIDE 50 MG/ML
25 INJECTION INTRAMUSCULAR; INTRAVENOUS EVERY 6 HOURS PRN
Status: DISCONTINUED | OUTPATIENT
Start: 2018-07-21 | End: 2018-07-25 | Stop reason: HOSPADM

## 2018-07-21 RX ORDER — SODIUM CHLORIDE 9 MG/ML
INJECTION, SOLUTION INTRAVENOUS CONTINUOUS
Status: DISPENSED | OUTPATIENT
Start: 2018-07-21 | End: 2018-07-21

## 2018-07-21 RX ORDER — VERAPAMIL HYDROCHLORIDE 2.5 MG/ML
INJECTION, SOLUTION INTRAVENOUS
Status: COMPLETED
Start: 2018-07-21 | End: 2018-07-21

## 2018-07-21 RX ORDER — DEXAMETHASONE SODIUM PHOSPHATE 4 MG/ML
4 INJECTION, SOLUTION INTRA-ARTICULAR; INTRALESIONAL; INTRAMUSCULAR; INTRAVENOUS; SOFT TISSUE
Status: DISCONTINUED | OUTPATIENT
Start: 2018-07-21 | End: 2018-07-25 | Stop reason: HOSPADM

## 2018-07-21 RX ORDER — LIDOCAINE HYDROCHLORIDE 20 MG/ML
INJECTION, SOLUTION INFILTRATION; PERINEURAL
Status: COMPLETED
Start: 2018-07-21 | End: 2018-07-21

## 2018-07-21 RX ADMIN — LISINOPRIL 20 MG: 20 TABLET ORAL at 05:09

## 2018-07-21 RX ADMIN — VERAPAMIL HYDROCHLORIDE 2.5 MG: 2.5 INJECTION, SOLUTION INTRAVENOUS at 11:44

## 2018-07-21 RX ADMIN — ACETAMINOPHEN 650 MG: 325 TABLET, FILM COATED ORAL at 07:13

## 2018-07-21 RX ADMIN — IOHEXOL 53 ML: 350 INJECTION, SOLUTION INTRAVENOUS at 11:52

## 2018-07-21 RX ADMIN — FENTANYL CITRATE 100 MCG: 50 INJECTION, SOLUTION INTRAMUSCULAR; INTRAVENOUS at 11:44

## 2018-07-21 RX ADMIN — FAMOTIDINE 20 MG: 20 TABLET ORAL at 05:10

## 2018-07-21 RX ADMIN — LIDOCAINE HYDROCHLORIDE: 20 INJECTION, SOLUTION INFILTRATION; PERINEURAL at 11:44

## 2018-07-21 RX ADMIN — METOPROLOL TARTRATE 25 MG: 25 TABLET, FILM COATED ORAL at 17:15

## 2018-07-21 RX ADMIN — ACETAMINOPHEN 650 MG: 325 TABLET, FILM COATED ORAL at 13:52

## 2018-07-21 RX ADMIN — ASPIRIN 81 MG: 81 TABLET, CHEWABLE ORAL at 10:22

## 2018-07-21 RX ADMIN — SODIUM CHLORIDE: 9 INJECTION, SOLUTION INTRAVENOUS at 12:58

## 2018-07-21 RX ADMIN — PRAVASTATIN SODIUM 20 MG: 20 TABLET ORAL at 17:15

## 2018-07-21 RX ADMIN — NITROGLYCERIN 10 ML: 20 INJECTION INTRAVENOUS at 11:44

## 2018-07-21 RX ADMIN — HEPARIN SODIUM: 1000 INJECTION, SOLUTION INTRAVENOUS; SUBCUTANEOUS at 11:44

## 2018-07-21 RX ADMIN — FAMOTIDINE 20 MG: 20 TABLET ORAL at 17:15

## 2018-07-21 RX ADMIN — METOPROLOL TARTRATE 50 MG: 50 TABLET ORAL at 05:10

## 2018-07-21 RX ADMIN — HEPARIN SODIUM 2000 UNITS: 200 INJECTION, SOLUTION INTRAVENOUS at 11:44

## 2018-07-21 RX ADMIN — MIDAZOLAM HYDROCHLORIDE 2 MG: 1 INJECTION, SOLUTION INTRAMUSCULAR; INTRAVENOUS at 11:44

## 2018-07-21 RX ADMIN — STANDARDIZED SENNA CONCENTRATE AND DOCUSATE SODIUM 2 TABLET: 8.6; 5 TABLET, FILM COATED ORAL at 05:10

## 2018-07-21 ASSESSMENT — ENCOUNTER SYMPTOMS
RESPIRATORY NEGATIVE: 1
MUSCULOSKELETAL NEGATIVE: 1
GASTROINTESTINAL NEGATIVE: 1
DIZZINESS: 0
EYES NEGATIVE: 1
FEVER: 0
BLURRED VISION: 0
DEPRESSION: 0
BRUISES/BLEEDS EASILY: 0
MYALGIAS: 0
PALPITATIONS: 0
HEARTBURN: 0
PSYCHIATRIC NEGATIVE: 1
CONSTITUTIONAL NEGATIVE: 1
COUGH: 0

## 2018-07-21 ASSESSMENT — PAIN SCALES - GENERAL
PAINLEVEL_OUTOF10: 0
PAINLEVEL_OUTOF10: 2
PAINLEVEL_OUTOF10: 3

## 2018-07-21 NOTE — PROGRESS NOTES
Cardiology Consult Follow-up Note    Date of note:    7/20/2018      ID: 53 year old male with past medical history of paroxysmal atrial fibrillation, hypertension, hyperlipidemia and type 2 diabetes mellitus was admitted for atrial fibrillation with RVR and syncope    Interval History:   Patient with few sinus pauses yesterday, longest one about 3.1s. Remains in atrial fibrillation, rate controlled  Reports constant chest pain.  Scheduled for cardiac cath today      Hospital problem list reviewed    Hospital Medications:    Current Facility-Administered Medications:   •  HEPARIN 1000 UNITS/ML OR USE ONLY, , , ,   •  LIDOCAINE HCL 2 % INJ SOLN, , , ,   •  HEPARIN (PORCINE) IN NACL 2-0.9 UNIT/ML-% INJ SOLN, , , ,   •  NITROGLYCERIN 2 MG IV SOLN, , , ,   •  MIDAZOLAM HCL 2 MG/2ML INJ SOLN, , , ,   •  FENTANYL CITRATE (PF) 0.05 MG/ML INJ SOLN, , , ,   •  VERAPAMIL HCL 2.5 MG/ML IV SOLN, , , ,   •  aspirin (ASA) chewable tab 81 mg, 81 mg, Oral, DAILY, Kisha Villegas M.D., 81 mg at 07/21/18 1022  •  metoprolol (LOPRESSOR) tablet 25 mg, 25 mg, Oral, TWICE DAILY, Kisha Villegas M.D.  •  [START ON 7/22/2018] lisinopril (PRINIVIL) tablet 10 mg, 10 mg, Oral, DAILY, Kisha Villegas M.D.  •  iohexol (OMNIPAQUE) 350 mg/mL, 200 mL, Intra-arterial, Once, Rocio Ross M.D.  •  NS infusion, , Intravenous, Continuous, Kisha Villegas M.D., Last Rate: 50 mL/hr at 07/20/18 2154  •  famotidine (PEPCID) tablet 20 mg, 20 mg, Oral, BID, JONNY UrrutiaOVane, 20 mg at 07/21/18 0510  •  pravastatin (PRAVACHOL) tablet 20 mg, 20 mg, Oral, Q EVENING, JONNY UrrutiaOVane, 20 mg at 07/20/18 1726  •  senna-docusate (PERICOLACE or SENOKOT S) 8.6-50 MG per tablet 2 Tab, 2 Tab, Oral, BID, 2 Tab at 07/21/18 0510 **AND** polyethylene glycol/lytes (MIRALAX) PACKET 1 Packet, 1 Packet, Oral, QDAY PRN **AND** magnesium hydroxide (MILK OF MAGNESIA) suspension 30 mL, 30 mL, Oral, QDAY PRN **AND** bisacodyl (DULCOLAX) suppository 10 mg, 10 mg,  Rectal, QDAY PRN, Wicho Colon D.O.  •  acetaminophen (TYLENOL) tablet 650 mg, 650 mg, Oral, Q6HRS PRN, Wicho Colon D.O., 650 mg at 07/21/18 0713  •  ondansetron (ZOFRAN) syringe/vial injection 4 mg, 4 mg, Intravenous, Q4HRS PRN, Wicho Colon D.O.  •  ondansetron (ZOFRAN ODT) dispertab 4 mg, 4 mg, Oral, Q4HRS PRN, Wicho Colon D.O.  •  promethazine (PHENERGAN) tablet 12.5-25 mg, 12.5-25 mg, Oral, Q4HRS PRN, Wicho Colon D.O.  •  promethazine (PHENERGAN) suppository 12.5-25 mg, 12.5-25 mg, Rectal, Q4HRS PRN, Wicho Colon D.O.  •  prochlorperazine (COMPAZINE) injection 5-10 mg, 5-10 mg, Intravenous, Q4HRS PRN, Wicho Colon D.O.  •  insulin regular (HUMULIN R) injection 2-9 Units, 2-9 Units, Subcutaneous, 4X/DAY ACHS, Stopped at 07/20/18 2100 **AND** Accu-Chek ACHS, , , Q AC AND BEDTIME(S) **AND** NOTIFY MD and PharmD, , , Once **AND** glucose 4 g chewable tablet 16 g, 16 g, Oral, Q15 MIN PRN **AND** dextrose 50% (D50W) injection 25 mL, 25 mL, Intravenous, Q15 MIN PRN, Wicho Colon D.O.    Physical Exam:  Vitals/   Weight/BMI: Body mass index is 35.73 kg/m².  Blood pressure 140/98, pulse 66, temperature 37.1 °C (98.8 °F), resp. rate 20, height 1.829 m (6'), weight 119.5 kg (263 lb 7.2 oz), SpO2 95 %.  Vitals:    07/20/18 2145 07/21/18 0139 07/21/18 0507 07/21/18 0845   BP: 127/82 102/58 144/95 140/98   Pulse: 64 65 70 66   Resp: 16 16 16 20   Temp: 36.6 °C (97.9 °F) 36.3 °C (97.4 °F) 36.4 °C (97.5 °F) 37.1 °C (98.8 °F)   SpO2: 96% 96% 95% 95%   Weight: 119.5 kg (263 lb 7.2 oz)      Height:         Oxygen Therapy:  Pulse Oximetry: 95 %, O2 Delivery: None (Room Air)    General: Obese male, awake, alert, not in any distress  HEENT: normocephalic, atraumatic, PERRLA, OP clear   Neck: No bruits No JVD.   CVS: Irregular rhythm, normal rate, S1, S2 normal. No murmurs, rubs or gallops  Resp: CTAB. No wheezing or crackles/rhonchi.  Abdomen: Soft, NT, ND,  Skin: Grossly nothing acute no obvious  jensen  Neurological: Alert, Moves all extremities, no cranial nerve defects on limited exam  Extremities: Pulse 2+ in b/l LE. No edema. No cyanosis.     Lab Data Review:  Recent Results (from the past 24 hour(s))   ACCU-CHEK GLUCOSE    Collection Time: 07/20/18  5:24 PM   Result Value Ref Range    Glucose - Accu-Ck 156 (H) 65 - 99 mg/dL   ACCU-CHEK GLUCOSE    Collection Time: 07/20/18  9:11 PM   Result Value Ref Range    Glucose - Accu-Ck 116 (H) 65 - 99 mg/dL   CBC WITH DIFFERENTIAL    Collection Time: 07/21/18  4:38 AM   Result Value Ref Range    WBC 12.9 (H) 4.8 - 10.8 K/uL    RBC 5.54 4.70 - 6.10 M/uL    Hemoglobin 15.1 14.0 - 18.0 g/dL    Hematocrit 46.1 42.0 - 52.0 %    MCV 83.2 81.4 - 97.8 fL    MCH 27.3 27.0 - 33.0 pg    MCHC 32.8 (L) 33.7 - 35.3 g/dL    RDW 41.3 35.9 - 50.0 fL    Platelet Count 212 164 - 446 K/uL    MPV 11.6 9.0 - 12.9 fL    Neutrophils-Polys 59.90 44.00 - 72.00 %    Lymphocytes 26.90 22.00 - 41.00 %    Monocytes 8.00 0.00 - 13.40 %    Eosinophils 3.90 0.00 - 6.90 %    Basophils 0.50 0.00 - 1.80 %    Immature Granulocytes 0.80 0.00 - 0.90 %    Nucleated RBC 0.00 /100 WBC    Neutrophils (Absolute) 7.73 (H) 1.82 - 7.42 K/uL    Lymphs (Absolute) 3.47 1.00 - 4.80 K/uL    Monos (Absolute) 1.03 (H) 0.00 - 0.85 K/uL    Eos (Absolute) 0.50 0.00 - 0.51 K/uL    Baso (Absolute) 0.07 0.00 - 0.12 K/uL    Immature Granulocytes (abs) 0.10 0.00 - 0.11 K/uL    NRBC (Absolute) 0.00 K/uL   ACCU-CHEK GLUCOSE    Collection Time: 07/21/18  5:14 AM   Result Value Ref Range    Glucose - Accu-Ck 120 (H) 65 - 99 mg/dL   BASIC METABOLIC PANEL    Collection Time: 07/21/18  8:50 AM   Result Value Ref Range    Sodium 134 (L) 135 - 145 mmol/L    Potassium 4.8 3.6 - 5.5 mmol/L    Chloride 102 96 - 112 mmol/L    Co2 24 20 - 33 mmol/L    Glucose 138 (H) 65 - 99 mg/dL    Bun 27 (H) 8 - 22 mg/dL    Creatinine 0.87 0.50 - 1.40 mg/dL    Calcium 9.7 8.5 - 10.5 mg/dL    Anion Gap 8.0 0.0 - 11.9   ESTIMATED GFR    Collection  Time: 07/21/18  8:50 AM   Result Value Ref Range    GFR If African American >60 >60 mL/min/1.73 m 2    GFR If Non African American >60 >60 mL/min/1.73 m 2   ACCU-CHEK GLUCOSE    Collection Time: 07/21/18 10:24 AM   Result Value Ref Range    Glucose - Accu-Ck 139 (H) 65 - 99 mg/dL           Assessment / Plan:     Atrial fibrillation with RVR  Syncope with chest pain  Hypertension  53 year old male with history of atrial fibrillation diagnosed in January presents with second episode of RVR in 2 weeks.CHADsVasc- 2. He also reportedly had a syncopal episode with chest discomfort. Initial troponin normal. He had a stress test in jan which was normal. Echo from 07/09 shows EF of 40-45%     Patient has been rate controlled overnight.   Metoprolol decreased to 25 mg BID as he has been having sinus pauses.  On xarelto for anticoagulation, held for cath. Resume anticoagulation after cath  Patient scheduled for cardiac today to r/o CAD as cause for syncope. Hold tonight's dose of xarelto.       Thank you for allowing us to participate in the care of this patient.

## 2018-07-21 NOTE — PROCEDURES
DATE OF SERVICE:  07/21/2018    REFERRING CARDIOLOGIST:  Dr. Edwards.    PREOPERATIVE DIAGNOSES:  1.  New onset left ventricular dysfunction.  2.  Syncope with coronary calcification concerning for coronary artery   disease.  3.  Atrial fibrillation with rapid rate and intermittent relatively long pauses.    POSTOPERATIVE DIAGNOSES:  1.  No significant coronary artery disease.  2.  Mildly reduced left ventricular systolic function with global hypokinesis.    Post-PVC ejection fraction in the range of 40-45%.  3.  Atrial fibrillation.    PROCEDURES:  1.  Left heart catheterization with left ventriculography.  2.  Selective coronary angiography.  3.  Conscious sedation.    COMPLICATIONS:  None.    DESCRIPTION OF PROCEDURE:  After informed consent was obtained, the patient   was brought to cardiac catheterization laboratory in fasting state.  Harpreet   test was carried out on the right hand and was found to be negative.  The   right wrist and right groin were prepped and draped in the usual sterile   fashion.  Versed and fentanyl were administered for conscious sedation.    Xylocaine 2% was used to anesthetize the right wrist area and a 6-Portuguese   Terumo glide sheath was then placed in the right radial artery using Seldinger   technique.    A 2.5 mg verapamil, 100 mcg of nitroglycerin, and 4000 units of heparin were   administered in the radial sheath.    Next, selective angiography of the left coronary artery was performed in   multiple views using a 5-Portuguese TIG catheter.  It was followed by selective   angiography of the right coronary artery in 2 orthogonal views using the same   catheter.    Next, the TIG catheter was advanced into the left ventricle.  Left ventricular   pressure was then recorded.  Left ventriculography was then performed using 24   mL of contrast injected over 3 seconds.  Left heart pullback was then   performed.  The TIG was then removed over the wire.  Radial sheath was    removed.  Hemostasis  was obtained using Terumo TR wrist band.  The patient   tolerated the procedure well.  He left cardiac catheterization laboratory in   stable condition.    Of note, I supervised monitoring under conscious sedation beginning at 11:40   a.m. until the end of the case at 11:53 a.m.    FINDINGS:  1.  Hemodynamics:  LV systolic pressure was 99 mmHg with LV end-diastolic   pressure of 11 mmHg.  There was no gradient across the aortic valve.    2.  Left ventriculography showed global hypokinesis.  Patient has significant   run of ventricular tachycardia during left ventriculography.  Post-PVC   ejection fraction was probably about 45%.    3.  No significant coronary artery disease.    This is a left dominant system.    The left main is rather short, almost a separate ostium.  There is no   significant disease in the left main.  The left main bifurcated into left   anterior descending artery and left circumflex artery.    The left anterior descending artery is the large caliber vessel.  It extends   to around the apex.  It gives rises to a one medium sized bifurcating diagonal   branch in its proximal segment.  There is no significant disease noted in the   left anterior descending or its major branches.    The left circumflex artery is large, dominant system. It gives rise to multiple   obtuse marginal branches and large posterior descending artery.  There is no   significant disease noted in the left circumflex artery or its major branches.    The right coronary is a nondominant system.  It is medium in size and has   about 30-40% ostial stenosis, but it could be from the catheter-induced spasm.  Antigrade flow is normal.    PLAN:  Resume anticoagulation for his atrial fibrillation.  Medical therapy   for LV dysfunction.       ____________________________________     MD GENESIS CALLAHAN / NOEMY    DD:  07/21/2018 12:22:00  DT:  07/21/2018 13:42:40    D#:  [id number]  Job#:  [id number]

## 2018-07-21 NOTE — CATH LAB
Immediate Post-Operative Note      PreOp Diagnosis: New LV dysfunction, syncope, AF    PostOp Diagnosis: No CAD, mildly reduced LVSF    Procedure(s) :  Coronary Angiography, Left Heart Catheterization, Left Ventriculography    Surgeon(s):  Rocio Ross M.D.    Type of Anesthesia: Moderate Sedation    Specimen: None    Estimated Blood Loss: 5 cc's      Findings: As above    Complications: none      Rocio Ross M.D.  7/21/2018 12:23 PM

## 2018-07-21 NOTE — PROGRESS NOTES
Cardiology Consult Follow-up Note    Date of note:    7/20/2018      ID: 53 year old male with past medical history of paroxysmal atrial fibrillation, hypertension, hyperlipidemia and type 2 diabetes mellitus was admitted for atrial fibrillation with RVR and syncope    Interval History: No events overnight. Remained in atrial fibrillation on telemetry with rate 52-72.   Had an episode of chest pain this morning.      Hospital problem list reviewed    Hospital Medications:    Current Facility-Administered Medications:   •  NS infusion, , Intravenous, Continuous, Kisha Villegas M.D.  •  metoprolol (LOPRESSOR) tablet 50 mg, 50 mg, Oral, Q8HRS, Kisha Villegas M.D., 50 mg at 07/20/18 1415  •  [START ON 7/21/2018] lisinopril (PRINIVIL) tablet 20 mg, 20 mg, Oral, DAILY, Kisha Villegas M.D.  •  famotidine (PEPCID) tablet 20 mg, 20 mg, Oral, BID, JONNY UrrutiaOVane, 20 mg at 07/20/18 1726  •  pravastatin (PRAVACHOL) tablet 20 mg, 20 mg, Oral, Q EVENING, JONNY UrrutiaOVane, 20 mg at 07/20/18 1726  •  senna-docusate (PERICOLACE or SENOKOT S) 8.6-50 MG per tablet 2 Tab, 2 Tab, Oral, BID **AND** polyethylene glycol/lytes (MIRALAX) PACKET 1 Packet, 1 Packet, Oral, QDAY PRN **AND** magnesium hydroxide (MILK OF MAGNESIA) suspension 30 mL, 30 mL, Oral, QDAY PRN **AND** bisacodyl (DULCOLAX) suppository 10 mg, 10 mg, Rectal, QDAY PRN, DANGELO Urrutia.O.  •  acetaminophen (TYLENOL) tablet 650 mg, 650 mg, Oral, Q6HRS PRN, JONNY UrrutiaO., 650 mg at 07/20/18 1415  •  ondansetron (ZOFRAN) syringe/vial injection 4 mg, 4 mg, Intravenous, Q4HRS PRN, JONNY UrrutiaO.  •  ondansetron (ZOFRAN ODT) dispertab 4 mg, 4 mg, Oral, Q4HRS PRN, Wicho Colon D.O.  •  promethazine (PHENERGAN) tablet 12.5-25 mg, 12.5-25 mg, Oral, Q4HRS PRN, JONNY UrrutiaO.  •  promethazine (PHENERGAN) suppository 12.5-25 mg, 12.5-25 mg, Rectal, Q4HRS PRN, JONNY UrrutiaO.  •  prochlorperazine (COMPAZINE) injection 5-10 mg, 5-10 mg,  Intravenous, Q4HRS PRN, Wicho Colon D.O.  •  insulin regular (HUMULIN R) injection 2-9 Units, 2-9 Units, Subcutaneous, 4X/DAY ACHS, 2 Units at 07/20/18 1727 **AND** Accu-Chek ACHS, , , Q AC AND BEDTIME(S) **AND** NOTIFY MD and PharmD, , , Once **AND** glucose 4 g chewable tablet 16 g, 16 g, Oral, Q15 MIN PRN **AND** dextrose 50% (D50W) injection 25 mL, 25 mL, Intravenous, Q15 MIN PRN, Wicho Colon D.O.    Physical Exam:  Vitals/   Weight/BMI: Body mass index is 35.82 kg/m².  Blood pressure 117/65, pulse 67, temperature 36.5 °C (97.7 °F), resp. rate 20, height 1.829 m (6'), weight 119.8 kg (264 lb 1.8 oz), SpO2 97 %.  Vitals:    07/20/18 0739 07/20/18 1315 07/20/18 1317 07/20/18 1319   BP: 100/73 116/77 107/71 117/65   Pulse: 65 67     Resp: 18 20     Temp: 36.6 °C (97.8 °F) 36.5 °C (97.7 °F)     SpO2: 95% 97%     Weight:       Height:         Oxygen Therapy:  Pulse Oximetry: 97 %, O2 (LPM): 0, O2 Delivery: None (Room Air)    General: Obese male, awake, alert, not in any distress  HEENT: normocephalic, atraumatic, PERRLA, OP clear   Neck: No bruits No JVD.   CVS: Irregular rhythm, normal rate, S1, S2 normal. No murmurs, rubs or gallops  Resp: CTAB. No wheezing or crackles/rhonchi.  Abdomen: Soft, NT, ND,  Skin: Grossly nothing acute no obvious rashes  Neurological: Alert, Moves all extremities, no cranial nerve defects on limited exam  Extremities: Pulse 2+ in b/l LE. No edema. No cyanosis.     Lab Data Review:  Recent Results (from the past 24 hour(s))   ACCU-CHEK GLUCOSE    Collection Time: 07/19/18  8:48 PM   Result Value Ref Range    Glucose - Accu-Ck 159 (H) 65 - 99 mg/dL   TROPONIN    Collection Time: 07/19/18 11:01 PM   Result Value Ref Range    Troponin I <0.01 0.00 - 0.04 ng/mL   Basic Metabolic Panel (BMP)    Collection Time: 07/20/18  2:18 AM   Result Value Ref Range    Sodium 134 (L) 135 - 145 mmol/L    Potassium 4.2 3.6 - 5.5 mmol/L    Chloride 104 96 - 112 mmol/L    Co2 20 20 - 33 mmol/L     Glucose 135 (H) 65 - 99 mg/dL    Bun 36 (H) 8 - 22 mg/dL    Creatinine 0.87 0.50 - 1.40 mg/dL    Calcium 9.5 8.5 - 10.5 mg/dL    Anion Gap 10.0 0.0 - 11.9   CBC without Differential    Collection Time: 07/20/18  2:18 AM   Result Value Ref Range    WBC 13.8 (H) 4.8 - 10.8 K/uL    RBC 5.91 4.70 - 6.10 M/uL    Hemoglobin 16.4 14.0 - 18.0 g/dL    Hematocrit 50.8 42.0 - 52.0 %    MCV 86.0 81.4 - 97.8 fL    MCH 27.7 27.0 - 33.0 pg    MCHC 32.3 (L) 33.7 - 35.3 g/dL    RDW 42.6 35.9 - 50.0 fL    Platelet Count 222 164 - 446 K/uL    MPV 11.8 9.0 - 12.9 fL   ESTIMATED GFR    Collection Time: 07/20/18  2:18 AM   Result Value Ref Range    GFR If African American >60 >60 mL/min/1.73 m 2    GFR If Non African American >60 >60 mL/min/1.73 m 2   ACCU-CHEK GLUCOSE    Collection Time: 07/20/18  5:54 AM   Result Value Ref Range    Glucose - Accu-Ck 128 (H) 65 - 99 mg/dL   Comp Metabolic Panel    Collection Time: 07/20/18 10:21 AM   Result Value Ref Range    Sodium 137 135 - 145 mmol/L    Potassium 4.7 3.6 - 5.5 mmol/L    Chloride 103 96 - 112 mmol/L    Co2 27 20 - 33 mmol/L    Anion Gap 7.0 0.0 - 11.9    Glucose 139 (H) 65 - 99 mg/dL    Bun 33 (H) 8 - 22 mg/dL    Creatinine 0.90 0.50 - 1.40 mg/dL    Calcium 9.6 8.5 - 10.5 mg/dL    AST(SGOT) 25 12 - 45 U/L    ALT(SGPT) 32 2 - 50 U/L    Alkaline Phosphatase 78 30 - 99 U/L    Total Bilirubin 1.0 0.1 - 1.5 mg/dL    Albumin 3.9 3.2 - 4.9 g/dL    Total Protein 7.5 6.0 - 8.2 g/dL    Globulin 3.6 (H) 1.9 - 3.5 g/dL    A-G Ratio 1.1 g/dL   CBC without Differential    Collection Time: 07/20/18 10:21 AM   Result Value Ref Range    WBC 13.2 (H) 4.8 - 10.8 K/uL    RBC 5.33 4.70 - 6.10 M/uL    Hemoglobin 14.6 14.0 - 18.0 g/dL    Hematocrit 45.4 42.0 - 52.0 %    MCV 85.2 81.4 - 97.8 fL    MCH 27.4 27.0 - 33.0 pg    MCHC 32.2 (L) 33.7 - 35.3 g/dL    RDW 42.4 35.9 - 50.0 fL    Platelet Count 242 164 - 446 K/uL    MPV 11.7 9.0 - 12.9 fL   Prothrombin Time (INR)    Collection Time: 07/20/18 10:21 AM    Result Value Ref Range    PT 18.8 (H) 12.0 - 14.6 sec    INR 1.61 (H) 0.87 - 1.13   APTT (PTT)    Collection Time: 07/20/18 10:21 AM   Result Value Ref Range    APTT 32.1 24.7 - 36.0 sec   ESTIMATED GFR    Collection Time: 07/20/18 10:21 AM   Result Value Ref Range    GFR If African American >60 >60 mL/min/1.73 m 2    GFR If Non African American >60 >60 mL/min/1.73 m 2   ACCU-CHEK GLUCOSE    Collection Time: 07/20/18 11:20 AM   Result Value Ref Range    Glucose - Accu-Ck 146 (H) 65 - 99 mg/dL           Assessment / Plan:     Atrial fibrillation with RVR  Syncope with chest pain  Hypertension  53 year old male with history of atrial fibrillation diagnosed in January presents with second episode of RVR in 2 weeks.CHADsVasc- 2. He also reportedly had a syncopal episode with chest discomfort. Initial troponin normal. He had a stress test in jan which was normal. Echo from 07/09 shows EF of 40-45%     Patient has been rate controlled overnight.   Diltiazem drip discontinued. Metoprolol increased to 50 mg TID  On xarelto for anticoagulation  No ventricular arrhythmia or AV block events on telemetry. Patient scheduled for cardiac cath tomorrow to r/o CAD as cause for syncope. Hold tonight's dose of xarelto.       Thank you for allowing us to participate in the care of this patient.

## 2018-07-21 NOTE — PROGRESS NOTES
[facility]    Date of Service: 2018    Chief Complaint  53 y.o. male admitted 2018 with admittted with atrial fibrillation , syncope and chest pain    Interval Problem Update  No acute vent overnight , pending cardiac cath today     Consultants/Specialty  Cardiology     Disposition  TBD         Review of Systems   Constitutional: Negative.  Negative for fever.   HENT: Negative.  Negative for hearing loss.    Eyes: Negative.  Negative for blurred vision.   Respiratory: Negative.  Negative for cough.    Cardiovascular: Positive for chest pain. Negative for palpitations.   Gastrointestinal: Negative.  Negative for heartburn.   Genitourinary: Negative.  Negative for dysuria.   Musculoskeletal: Negative.  Negative for myalgias.   Skin: Negative.  Negative for rash.   Neurological: Negative for dizziness.   Endo/Heme/Allergies: Does not bruise/bleed easily.   Psychiatric/Behavioral: Negative.  Negative for depression.      Physical Exam  Laboratory/Imaging   Hemodynamics  Temp (24hrs), Av.5 °C (97.7 °F), Min:36.3 °C (97.4 °F), Max:37.1 °C (98.8 °F)   Temperature: 36.4 °C (97.6 °F)  Pulse  Av.8  Min: 60  Max: 134    Blood Pressure: 113/78      Respiratory      Respiration: 20, Pulse Oximetry: 97 %             Fluids    Intake/Output Summary (Last 24 hours) at 18 1422  Last data filed at 18 1030   Gross per 24 hour   Intake                0 ml   Output             1025 ml   Net            -1025 ml       Nutrition  Orders Placed This Encounter   Procedures   • Diet Order Cardiac     Standing Status:   Standing     Number of Occurrences:   1     Order Specific Question:   Diet:     Answer:   Cardiac [6]     Physical Exam   Constitutional: He is oriented to person, place, and time. He appears well-developed and well-nourished.   HENT:   Head: Atraumatic.   Neck: Normal range of motion. Neck supple.   Cardiovascular: An irregularly irregular rhythm present.   Pulmonary/Chest:  Effort normal and breath sounds normal.   Abdominal: Soft. Bowel sounds are normal.   Musculoskeletal: Normal range of motion.   Neurological: He is alert and oriented to person, place, and time.       Recent Labs      07/20/18   0218  07/20/18   1021  07/21/18   0438   WBC  13.8*  13.2*  12.9*   RBC  5.91  5.33  5.54   HEMOGLOBIN  16.4  14.6  15.1   HEMATOCRIT  50.8  45.4  46.1   MCV  86.0  85.2  83.2   MCH  27.7  27.4  27.3   MCHC  32.3*  32.2*  32.8*   RDW  42.6  42.4  41.3   PLATELETCT  222  242  212   MPV  11.8  11.7  11.6     Recent Labs      07/20/18   0218  07/20/18   1021  07/21/18   0850   SODIUM  134*  137  134*   POTASSIUM  4.2  4.7  4.8   CHLORIDE  104  103  102   CO2  20  27  24   GLUCOSE  135*  139*  138*   BUN  36*  33*  27*   CREATININE  0.87  0.90  0.87   CALCIUM  9.5  9.6  9.7     Recent Labs      07/19/18   1047  07/20/18   1021   APTT  <20.0*  32.1   INR  1.14*  1.61*     Recent Labs      07/19/18   1013   BNPBTYPENAT  114*              Assessment/Plan     * Atrial fibrillation with rapid ventricular response (HCC)- (present on admission)   Assessment & Plan    Recently had decrease in his home metoprolol from 100 mg twice daily to 25 mg twice daily.  Denies any recent dehydration or volume loss.  s/p  Diltiazem drip.  Cardiology on board  Monitor on telemetry  Correct all electrolyte abnormalities.  Had recent echocardiogram with EF of 40%  He is normally on anticoagulation with Xarelto as well as being on aspirin(held for possible cardiac cath today).   CHADS score of at lest 2  -         Leukocytosis   Assessment & Plan    Probably reactive         Chest pain   Assessment & Plan    Probably due to afib , pending cardiac cath today         Syncope   Assessment & Plan    Likely due to atrial fibrillation with rapid ventricular rate.  Check orthostatic blood pressures once heart rate has stabilized        Dizziness- (present on admission)   Assessment & Plan    Due to rapid ventricular rate and  atrial fibrillation.        HLD (hyperlipidemia)- (present on admission)   Assessment & Plan    Continue with pravastatin for ongoing active treatment        Hypertension- (present on admission)   Assessment & Plan    Monitor vitals.  Continue with outpatient lisinopril I have increased his metoprolol to 50 mg.  Holding outpatient HCTZ        Type 2 diabetes mellitus (HCC)- (present on admission)   Assessment & Plan    Monitor Accu-Cheks and cover with sliding scale insulin.    will hold  metformin.  Diabetic diet        Obesity- (present on admission)   Assessment & Plan    History of morbid obesity.  Patient states there is been over 200 pounds with diet and exercise over the last few years.  Needs ongoing continued lifestyle management to get to a ideal body weight.        Euvolemic Hyponatremia- (present on admission)   Assessment & Plan    Gentle IV fluids monitor daily BMP  Likely adverse effect of HCTZ          Quality-Core Measures   Reviewed items::  Medications reviewed, Labs reviewed and Radiology images reviewed  DVT: Xarelto.  DVT prophylaxis - mechanical:  SCDs

## 2018-07-21 NOTE — CARE PLAN
Problem: Mobility  Goal: Risk for activity intolerance will decrease    Intervention: Encourage patient to increase activity level in collaboration with Interdisciplinary Team  Patient encouraged to walk and be active as much as he can tolerate without feeling symptoms of his irregular heartbeat.

## 2018-07-22 ENCOUNTER — APPOINTMENT (OUTPATIENT)
Dept: RADIOLOGY | Facility: MEDICAL CENTER | Age: 53
DRG: 286 | End: 2018-07-22
Attending: PSYCHIATRY & NEUROLOGY
Payer: MEDICAID

## 2018-07-22 ENCOUNTER — APPOINTMENT (OUTPATIENT)
Dept: RADIOLOGY | Facility: MEDICAL CENTER | Age: 53
DRG: 286 | End: 2018-07-22
Attending: INTERNAL MEDICINE
Payer: MEDICAID

## 2018-07-22 PROBLEM — G82.20 PARALYSIS OF BOTH LOWER LIMBS (HCC): Status: ACTIVE | Noted: 2018-07-22

## 2018-07-22 LAB
ANION GAP SERPL CALC-SCNC: 3 MMOL/L (ref 0–11.9)
BASOPHILS # BLD AUTO: 0.4 % (ref 0–1.8)
BASOPHILS # BLD: 0.04 K/UL (ref 0–0.12)
BUN SERPL-MCNC: 24 MG/DL (ref 8–22)
CALCIUM SERPL-MCNC: 9.7 MG/DL (ref 8.5–10.5)
CHLORIDE SERPL-SCNC: 103 MMOL/L (ref 96–112)
CO2 SERPL-SCNC: 28 MMOL/L (ref 20–33)
CREAT SERPL-MCNC: 0.91 MG/DL (ref 0.5–1.4)
EOSINOPHIL # BLD AUTO: 0.34 K/UL (ref 0–0.51)
EOSINOPHIL NFR BLD: 3.2 % (ref 0–6.9)
ERYTHROCYTE [DISTWIDTH] IN BLOOD BY AUTOMATED COUNT: 40.8 FL (ref 35.9–50)
GLUCOSE BLD-MCNC: 120 MG/DL (ref 65–99)
GLUCOSE BLD-MCNC: 125 MG/DL (ref 65–99)
GLUCOSE BLD-MCNC: 127 MG/DL (ref 65–99)
GLUCOSE BLD-MCNC: 150 MG/DL (ref 65–99)
GLUCOSE SERPL-MCNC: 125 MG/DL (ref 65–99)
HCT VFR BLD AUTO: 46.9 % (ref 42–52)
HGB BLD-MCNC: 15.6 G/DL (ref 14–18)
IMM GRANULOCYTES # BLD AUTO: 0.04 K/UL (ref 0–0.11)
IMM GRANULOCYTES NFR BLD AUTO: 0.4 % (ref 0–0.9)
LYMPHOCYTES # BLD AUTO: 2.59 K/UL (ref 1–4.8)
LYMPHOCYTES NFR BLD: 24.4 % (ref 22–41)
MCH RBC QN AUTO: 27.9 PG (ref 27–33)
MCHC RBC AUTO-ENTMCNC: 33.3 G/DL (ref 33.7–35.3)
MCV RBC AUTO: 83.9 FL (ref 81.4–97.8)
MONOCYTES # BLD AUTO: 0.66 K/UL (ref 0–0.85)
MONOCYTES NFR BLD AUTO: 6.2 % (ref 0–13.4)
NEUTROPHILS # BLD AUTO: 6.93 K/UL (ref 1.82–7.42)
NEUTROPHILS NFR BLD: 65.4 % (ref 44–72)
NRBC # BLD AUTO: 0 K/UL
NRBC BLD-RTO: 0 /100 WBC
PLATELET # BLD AUTO: 202 K/UL (ref 164–446)
PMV BLD AUTO: 11.6 FL (ref 9–12.9)
POTASSIUM SERPL-SCNC: 4.6 MMOL/L (ref 3.6–5.5)
RBC # BLD AUTO: 5.59 M/UL (ref 4.7–6.1)
SODIUM SERPL-SCNC: 134 MMOL/L (ref 135–145)
WBC # BLD AUTO: 10.6 K/UL (ref 4.8–10.8)

## 2018-07-22 PROCEDURE — A9270 NON-COVERED ITEM OR SERVICE: HCPCS | Performed by: STUDENT IN AN ORGANIZED HEALTH CARE EDUCATION/TRAINING PROGRAM

## 2018-07-22 PROCEDURE — 82962 GLUCOSE BLOOD TEST: CPT | Mod: 91

## 2018-07-22 PROCEDURE — 770020 HCHG ROOM/CARE - TELE (206)

## 2018-07-22 PROCEDURE — 72141 MRI NECK SPINE W/O DYE: CPT

## 2018-07-22 PROCEDURE — 72131 CT LUMBAR SPINE W/O DYE: CPT

## 2018-07-22 PROCEDURE — 700102 HCHG RX REV CODE 250 W/ 637 OVERRIDE(OP): Performed by: HOSPITALIST

## 2018-07-22 PROCEDURE — 700102 HCHG RX REV CODE 250 W/ 637 OVERRIDE(OP): Performed by: STUDENT IN AN ORGANIZED HEALTH CARE EDUCATION/TRAINING PROGRAM

## 2018-07-22 PROCEDURE — 36415 COLL VENOUS BLD VENIPUNCTURE: CPT

## 2018-07-22 PROCEDURE — A9270 NON-COVERED ITEM OR SERVICE: HCPCS | Performed by: HOSPITALIST

## 2018-07-22 PROCEDURE — 80048 BASIC METABOLIC PNL TOTAL CA: CPT

## 2018-07-22 PROCEDURE — 72148 MRI LUMBAR SPINE W/O DYE: CPT

## 2018-07-22 PROCEDURE — 99233 SBSQ HOSP IP/OBS HIGH 50: CPT | Performed by: INTERNAL MEDICINE

## 2018-07-22 PROCEDURE — 85025 COMPLETE CBC W/AUTO DIFF WBC: CPT

## 2018-07-22 PROCEDURE — 72146 MRI CHEST SPINE W/O DYE: CPT

## 2018-07-22 RX ORDER — LISINOPRIL 5 MG/1
5 TABLET ORAL DAILY
Status: DISCONTINUED | OUTPATIENT
Start: 2018-07-23 | End: 2018-07-25 | Stop reason: HOSPADM

## 2018-07-22 RX ADMIN — FAMOTIDINE 20 MG: 20 TABLET ORAL at 05:42

## 2018-07-22 RX ADMIN — RIVAROXABAN 20 MG: 20 TABLET, FILM COATED ORAL at 17:42

## 2018-07-22 RX ADMIN — FAMOTIDINE 20 MG: 20 TABLET ORAL at 17:42

## 2018-07-22 RX ADMIN — ASPIRIN 81 MG: 81 TABLET, CHEWABLE ORAL at 05:43

## 2018-07-22 RX ADMIN — METOPROLOL TARTRATE 25 MG: 25 TABLET, FILM COATED ORAL at 05:42

## 2018-07-22 RX ADMIN — METOPROLOL TARTRATE 25 MG: 25 TABLET, FILM COATED ORAL at 17:42

## 2018-07-22 RX ADMIN — LISINOPRIL 10 MG: 5 TABLET ORAL at 05:42

## 2018-07-22 RX ADMIN — STANDARDIZED SENNA CONCENTRATE AND DOCUSATE SODIUM 2 TABLET: 8.6; 5 TABLET, FILM COATED ORAL at 05:42

## 2018-07-22 RX ADMIN — PRAVASTATIN SODIUM 20 MG: 20 TABLET ORAL at 17:42

## 2018-07-22 ASSESSMENT — ENCOUNTER SYMPTOMS
PALPITATIONS: 0
DEPRESSION: 0
MUSCULOSKELETAL NEGATIVE: 1
RESPIRATORY NEGATIVE: 1
HEARTBURN: 0
GASTROINTESTINAL NEGATIVE: 1
PSYCHIATRIC NEGATIVE: 1
MYALGIAS: 0
EYES NEGATIVE: 1
BLURRED VISION: 0
SENSORY CHANGE: 0
COUGH: 0
BRUISES/BLEEDS EASILY: 0
FEVER: 0
FOCAL WEAKNESS: 1
CONSTITUTIONAL NEGATIVE: 1

## 2018-07-22 ASSESSMENT — PAIN SCALES - GENERAL
PAINLEVEL_OUTOF10: 0
PAINLEVEL_OUTOF10: 2
PAINLEVEL_OUTOF10: 0
PAINLEVEL_OUTOF10: 0

## 2018-07-22 NOTE — CARE PLAN
Problem: Safety  Goal: Will remain free from injury  Outcome: PROGRESSING AS EXPECTED  Fall precautions in place. Bed in lowest position. Non-skid socks in place. Personal possessions within reach. Mobility sign on door. Call light within reach. Pt educated regarding fall prevention and states understanding.     Problem: Knowledge Deficit  Goal: Knowledge of disease process/condition, treatment plan, diagnostic tests, and medications will improve  Outcome: PROGRESSING AS EXPECTED  Pt educated regarding plan of care and medications. All questions answered.

## 2018-07-22 NOTE — PROGRESS NOTES
Cardiology Consult Follow-up Note    Date of note:    7/20/2018      ID: 53 year old male with past medical history of paroxysmal atrial fibrillation, hypertension, hyperlipidemia and type 2 diabetes mellitus was admitted for atrial fibrillation with RVR and syncope    Interval History:   - Cath done yesterday; showed no significant coronary artery disease.  - Patient without sinus pauses overnight.  Remains in atrial fibrillation, rate controlled.  - States chest pain is improved today from yesterday.      Hospital problem list reviewed.    Hospital Medications:    Current Facility-Administered Medications:   •  [START ON 7/23/2018] lisinopril (PRINIVIL) tablet 5 mg, 5 mg, Oral, DAILY, Azeb Colon M.D.  •  rivaroxaban (XARELTO) tablet 20 mg, 20 mg, Oral, PM MEAL, Azeb Colon M.D.  •  aspirin (ASA) chewable tab 81 mg, 81 mg, Oral, DAILY, Kisha Villegas M.D., 81 mg at 07/22/18 0543  •  metoprolol (LOPRESSOR) tablet 25 mg, 25 mg, Oral, TWICE DAILY, Kisha Villegas M.D., 25 mg at 07/22/18 0542  •  ondansetron (ZOFRAN) syringe/vial injection 4 mg, 4 mg, Intravenous, Q4HRS PRN, Rocio Ross M.D.  •  dexamethasone (DECADRON) injection 4 mg, 4 mg, Intravenous, Once PRN, Rocio Ross M.D.  •  diphenhydrAMINE (BENADRYL) injection 25 mg, 25 mg, Intravenous, Q6HRS PRN, Rocio Ross M.D.  •  haloperidol lactate (HALDOL) injection 1 mg, 1 mg, Intravenous, Q6HRS PRN, Rocio Ross M.D.  •  scopolamine (TRANSDERM-SCOP) patch 1 Patch, 1 Patch, Transdermal, Q72HRS PRN, Rocio Ross M.D.  •  acetaminophen (TYLENOL) tablet 650 mg, 650 mg, Oral, Q6HRS PRN, Rocio Ross M.D., 650 mg at 07/21/18 1352  •  famotidine (PEPCID) tablet 20 mg, 20 mg, Oral, BID, Wicho Colon D.O., 20 mg at 07/22/18 0542  •  pravastatin (PRAVACHOL) tablet 20 mg, 20 mg, Oral, Q EVENING, JONNY UrrutiaO., 20 mg at 07/21/18 1715  •  senna-docusate (PERICOLACE or SENOKOT S) 8.6-50 MG per tablet  2 Tab, 2 Tab, Oral, BID, 2 Tab at 07/22/18 0542 **AND** polyethylene glycol/lytes (MIRALAX) PACKET 1 Packet, 1 Packet, Oral, QDAY PRN **AND** magnesium hydroxide (MILK OF MAGNESIA) suspension 30 mL, 30 mL, Oral, QDAY PRN **AND** bisacodyl (DULCOLAX) suppository 10 mg, 10 mg, Rectal, QDAY PRN, DANGELO Urrutia.O.  •  ondansetron (ZOFRAN ODT) dispertab 4 mg, 4 mg, Oral, Q4HRS PRN, DANGELO Urrutia.O.  •  promethazine (PHENERGAN) tablet 12.5-25 mg, 12.5-25 mg, Oral, Q4HRS PRN, DANGELO Urrutia.O.  •  promethazine (PHENERGAN) suppository 12.5-25 mg, 12.5-25 mg, Rectal, Q4HRS PRN, DANGELO Urrutia.O.  •  prochlorperazine (COMPAZINE) injection 5-10 mg, 5-10 mg, Intravenous, Q4HRS PRN, DANGELO Urrutia.O.  •  insulin regular (HUMULIN R) injection 2-9 Units, 2-9 Units, Subcutaneous, 4X/DAY ACHS, Stopped at 07/20/18 2100 **AND** Accu-Chek ACHS, , , Q AC AND BEDTIME(S) **AND** NOTIFY MD and PharmD, , , Once **AND** glucose 4 g chewable tablet 16 g, 16 g, Oral, Q15 MIN PRN **AND** dextrose 50% (D50W) injection 25 mL, 25 mL, Intravenous, Q15 MIN PRN, DANGELO Urrutia.O.    Physical Exam:  Vitals/   Weight/BMI: Body mass index is 35.91 kg/m².  Blood pressure 129/80, pulse 71, temperature 36.7 °C (98 °F), resp. rate 20, height 1.829 m (6'), weight 120.1 kg (264 lb 12.4 oz), SpO2 93 %.  Vitals:    07/21/18 2032 07/22/18 0045 07/22/18 0530 07/22/18 1022   BP: 131/77 140/73 125/77 129/80   Pulse: 63 77 86 71   Resp: 12 12 12 20   Temp: 36.3 °C (97.4 °F) 36.4 °C (97.6 °F) 36.1 °C (97 °F) 36.7 °C (98 °F)   SpO2: 95% 96% 94% 93%   Weight: 120.1 kg (264 lb 12.4 oz)      Height:         Oxygen Therapy:  Pulse Oximetry: 93 %, O2 (LPM): 0, O2 Delivery: None (Room Air)    General: Obese male, awake, alert, not in any distress  HEENT: normocephalic, atraumatic, PERRLA, OP clear   Neck: No bruits No JVD.   CVS: Irregular rhythm, normal rate, S1, S2 normal. No murmurs, rubs or gallops.  Unchanged from yesterday.   Resp: CTAB. No wheezing  or crackles/rhonchi.  Abdomen: Soft, NT, ND,  Skin: Grossly nothing acute no obvious rashes  Neurological: Alert, Moves all extremities, no cranial nerve defects on limited exam  Extremities: Pulse 2+ in b/l LE. No edema. No cyanosis.     Lab Data Review:  Recent Results (from the past 24 hour(s))   ACCU-CHEK GLUCOSE    Collection Time: 07/21/18  4:40 PM   Result Value Ref Range    Glucose - Accu-Ck 146 (H) 65 - 99 mg/dL   ACCU-CHEK GLUCOSE    Collection Time: 07/21/18  8:00 PM   Result Value Ref Range    Glucose - Accu-Ck 142 (H) 65 - 99 mg/dL   CBC WITH DIFFERENTIAL    Collection Time: 07/22/18  4:39 AM   Result Value Ref Range    WBC 10.6 4.8 - 10.8 K/uL    RBC 5.59 4.70 - 6.10 M/uL    Hemoglobin 15.6 14.0 - 18.0 g/dL    Hematocrit 46.9 42.0 - 52.0 %    MCV 83.9 81.4 - 97.8 fL    MCH 27.9 27.0 - 33.0 pg    MCHC 33.3 (L) 33.7 - 35.3 g/dL    RDW 40.8 35.9 - 50.0 fL    Platelet Count 202 164 - 446 K/uL    MPV 11.6 9.0 - 12.9 fL    Neutrophils-Polys 65.40 44.00 - 72.00 %    Lymphocytes 24.40 22.00 - 41.00 %    Monocytes 6.20 0.00 - 13.40 %    Eosinophils 3.20 0.00 - 6.90 %    Basophils 0.40 0.00 - 1.80 %    Immature Granulocytes 0.40 0.00 - 0.90 %    Nucleated RBC 0.00 /100 WBC    Neutrophils (Absolute) 6.93 1.82 - 7.42 K/uL    Lymphs (Absolute) 2.59 1.00 - 4.80 K/uL    Monos (Absolute) 0.66 0.00 - 0.85 K/uL    Eos (Absolute) 0.34 0.00 - 0.51 K/uL    Baso (Absolute) 0.04 0.00 - 0.12 K/uL    Immature Granulocytes (abs) 0.04 0.00 - 0.11 K/uL    NRBC (Absolute) 0.00 K/uL   Basic Metabolic Panel (BMP) Tomorrow AM (LAB)    Collection Time: 07/22/18  4:39 AM   Result Value Ref Range    Sodium 134 (L) 135 - 145 mmol/L    Potassium 4.6 3.6 - 5.5 mmol/L    Chloride 103 96 - 112 mmol/L    Co2 28 20 - 33 mmol/L    Glucose 125 (H) 65 - 99 mg/dL    Bun 24 (H) 8 - 22 mg/dL    Creatinine 0.91 0.50 - 1.40 mg/dL    Calcium 9.7 8.5 - 10.5 mg/dL    Anion Gap 3.0 0.0 - 11.9   ESTIMATED GFR    Collection Time: 07/22/18  4:39 AM   Result  Value Ref Range    GFR If African American >60 >60 mL/min/1.73 m 2    GFR If Non African American >60 >60 mL/min/1.73 m 2   ACCU-CHEK GLUCOSE    Collection Time: 07/22/18  5:29 AM   Result Value Ref Range    Glucose - Accu-Ck 120 (H) 65 - 99 mg/dL           Assessment / Plan:     Atrial fibrillation with RVR  Syncope with chest pain  Hypertension  53 year old male with history of atrial fibrillation diagnosed in January presents with second episode of RVR in 2 weeks.CHADsVasc- 2. He also reportedly had a syncopal episode with chest discomfort. Initial troponin normal. He had a stress test in jan which was normal. Echo from 07/09 shows EF of 40-45%     7/21/18 cath showed no significant coronary artery disease.    Patient has been rate controlled overnight.   Continue metoprolol 25 mg twice daily.    Decreased lisinopril to 5 mg daily.    Restart Xarelto for anticoagulation; had been held for cath.   Patient to follow up with Cardiology clinic outpatient.      Will sign off.  Thank you for allowing us to participate in the care of Mr. Castro.  We are available if further questions arise.    Azeb Colon MD, PGY-2  [phone]

## 2018-07-22 NOTE — PROGRESS NOTES
1. Woke up this morning with paraplegia-- tingling below inguinal regions and could not move legs at all  2. Similar paraplegia 2 years ago, took him one month to recover-- he went to rehabilitation department  3. Admit with atrial fib this time  4. Lack of social support, lives alone in the past 6 months    MANAGEMENT    Will offer MRI of whole spine stat to exclude transverse myelitis or spinal cord infarction or any mechanical compression of the spinal cord    Besides transverse myelitis, psychogenic factors may play some roles as well      Muscle strength good in upper limbs  Legs, could not raise above the bed, but the muscle tone is not absent either  Inconsistence in doing muscle tests-- some psychogenic components remains possible  Reflexes are 1+   Legs are swollen  Could try steroid if the leg weakness persists

## 2018-07-22 NOTE — PROGRESS NOTES
Report received from day shift RN at bedside. Patient is A&O x4, resting in bed in no acute distress. Right radial site C/D/I with dressing in place. POC discussed with patient, no questions at this time. Bed is locked, in lowest position and call light is in place.

## 2018-07-22 NOTE — CONSULTS
NEUROLOGY NOTE    Referring Physician  Anmol Mooney M.D.      CHIEF COMPLAINT:  Woke up this morning with paraplegia  Chief Complaint   Patient presents with   • Atrial Fibrillation   • Palpitations       IMPRESSION:    1. Woke up this morning with paraplegia-- tingling below inguinal regions and could not move legs at all: Similar paraplegia 2 years ago, took him one month to recover-- he went to rehabilitation department and slowly improved    2. Atrial fib this time    3. Lack of social support, lives alone in the past 6 months         MANAGEMENT    ________________________________________________________________________       MRI of whole spine stat showed cervical spondylosis- advise neurosurgeon consultation     Besides cervical stenosis, psychogenic factors may play some roles as well or mixed    DR Childress will continue to follow this patient tomorrow     Muscle strength good in upper limbs  Legs, could not raise above the bed, but the muscle tone is not absent either  Inconsistence in doing muscle tests-- some psychogenic components remains possible  Reflexes are 1+   Legs are swollen  Could try steroid if the leg weakness persists  ________________________________________________________________________  Central canal narrowing worst C4-C5-- C6      SIGNATURE:  Fanta Gusman    CC:  Jerad Lomax M.D.        PRESENT ILLNESS:   1. Woke up this morning with paraplegia-- tingling below inguinal regions and could not move legs at all  2. Similar paraplegia 2 years ago, took him one month to recover-- he went to rehabilitation department  3. Admit with atrial fib this time  4. Lack of social support, lives alone in the past 6 months           Muscle strength good in upper limbs  Legs, could not raise above the bed, but the muscle tone is not absent either  Inconsistence in doing muscle tests-- some psychogenic components remains possible  Reflexes are 1+   Legs are swollen  Could try steroid if the leg weakness  persists    PAST MEDICAL HISTORY:  Past Medical History:   Diagnosis Date   • A-fib (HCC)    • Chest pain    • Diabetes (HCC)    • Diabetic neuropathy (HCC)    • Hypercholesterolemia    • Hypertension    • Morbid obesity (HCC)    • Noncompliance    • Normal cardiac stress test        PAST SURGICAL HISTORY:  Past Surgical History:   Procedure Laterality Date   • IRRIGATION & DEBRIDEMENT ORTHO Right 2/19/2018    Procedure: IRRIGATION & DEBRIDEMENT ORTHO-FOOT;  Surgeon: Kirby Lopez M.D.;  Location: SURGERY [facility];  Service: Orthopedics   • TOE AMPUTATION Right 1/17/2018    Procedure: TOE AMPUTATION-1ST RAY;  Surgeon: Doug Delong M.D.;  Location: SURGERY [facility];  Service: Orthopedics   • TOE AMPUTATION Right 11/10/2017    Procedure: TOE AMPUTATION;  Surgeon: Osorio Leo M.D.;  Location: SURGERY [facility];  Service: Orthopedics       FAMILY HISTORY:  No family history on file.    SOCIAL HISTORY:  Social History     Social History   • Marital status:      Spouse name: N/A   • Number of children: N/A   • Years of education: N/A     Occupational History   • Not on file.     Social History Main Topics   • Smoking status: Never Smoker   • Smokeless tobacco: Never Used   • Alcohol use No   • Drug use: No   • Sexual activity: Not on file     Other Topics Concern   • Not on file     Social History Narrative   • No narrative on file     ALLERGIES:  Allergies   Allergen Reactions   • Daptomycin Rash     Body rash  RXN=1/2018     • Pcn [Penicillins] Hives and Rash      Rash & hives  RXN=since a kid   • Unasyn [Ampicillin-Sulbactam Sodium] Hives, Itching and Swelling   • Vancomycin Rash     Red man syndrome     TOBHX  History   Smoking Status   • Never Smoker   Smokeless Tobacco   • Never Used     ALCHX  History   Alcohol Use No     DRUGHX  History   Drug Use No           MEDICATIONS:  Current Facility-Administered Medications   Medication Dose   • [START ON 7/23/2018] lisinopril  (PRINIVIL) tablet 5 mg  5 mg   • rivaroxaban (XARELTO) tablet 20 mg  20 mg   • aspirin (ASA) chewable tab 81 mg  81 mg   • metoprolol (LOPRESSOR) tablet 25 mg  25 mg   • ondansetron (ZOFRAN) syringe/vial injection 4 mg  4 mg   • dexamethasone (DECADRON) injection 4 mg  4 mg   • diphenhydrAMINE (BENADRYL) injection 25 mg  25 mg   • haloperidol lactate (HALDOL) injection 1 mg  1 mg   • scopolamine (TRANSDERM-SCOP) patch 1 Patch  1 Patch   • acetaminophen (TYLENOL) tablet 650 mg  650 mg   • famotidine (PEPCID) tablet 20 mg  20 mg   • pravastatin (PRAVACHOL) tablet 20 mg  20 mg   • senna-docusate (PERICOLACE or SENOKOT S) 8.6-50 MG per tablet 2 Tab  2 Tab    And   • polyethylene glycol/lytes (MIRALAX) PACKET 1 Packet  1 Packet    And   • magnesium hydroxide (MILK OF MAGNESIA) suspension 30 mL  30 mL    And   • bisacodyl (DULCOLAX) suppository 10 mg  10 mg   • ondansetron (ZOFRAN ODT) dispertab 4 mg  4 mg   • promethazine (PHENERGAN) tablet 12.5-25 mg  12.5-25 mg   • promethazine (PHENERGAN) suppository 12.5-25 mg  12.5-25 mg   • prochlorperazine (COMPAZINE) injection 5-10 mg  5-10 mg   • insulin regular (HUMULIN R) injection 2-9 Units  2-9 Units    And   • glucose 4 g chewable tablet 16 g  16 g    And   • dextrose 50% (D50W) injection 25 mL  25 mL       REVIEW OF SYSTEM:    Constitutional: Denies fevers, Denies weight changes   Eyes: Denies changes in vision, no eye pain   Ears/Nose/Throat/Mouth: Denies nasal congestion or sore throat   Cardiovascular: atrial fib, HTN  Respiratory: Denies SOB.   Gastrointestinal/Hepatic: Denies abdominal pain, nausea, vomiting, diarrhea, constipation or GI bleeding   Genitourinary: Denies bladder dysfunction, dysuria or frequency   Musculoskeletal/Rheum: Denies joint pain and swelling   Skin/Breast: Denies rash, denies breast lumps or discharge   Neurological: paraplegia  Psychiatric: denies mood disorder   Endocrine: DM  Heme/Oncology/Lymph Nodes: Denies enlarged lymph nodes, denies  brusing or known bleeding disorder   Allergic/Immunologic: Denies hx of allergies   obesity      PHYSICAL AND NEUROLOGICAL EXMAINATIONS:  VITAL SIGNS: /80   Pulse 71   Temp 36.7 °C (98 °F)   Resp 20   Ht 1.829 m (6')   Wt 120.1 kg (264 lb 12.4 oz)   SpO2 93%   BMI 35.91 kg/m²   CURRENT WEIGHT: [unfilled]  BMI: Body mass index is 35.91 kg/m².  PREVIOUS WEIGHTS:  Wt Readings from Last 25 Encounters:   07/21/18 120.1 kg (264 lb 12.4 oz)   07/16/18 116.1 kg (255 lb 15.3 oz)   04/27/18 123.4 kg (272 lb)   04/24/18 (!) 127.5 kg (281 lb)   03/23/18 (!) 129.3 kg (285 lb)   03/15/18 (!) 133.4 kg (294 lb)   03/12/18 (!) 133.4 kg (294 lb)   03/08/18 (!) 133.8 kg (295 lb)   03/07/18 (!) 135.2 kg (298 lb)   02/28/18 (!) 129.3 kg (285 lb)   02/18/18 (!) 133.3 kg (293 lb 14 oz)   02/14/18 (!) 133.8 kg (295 lb)   02/08/18 (!) 134 kg (295 lb 6.7 oz)   02/08/18 (!) 137.1 kg (302 lb 3.2 oz)   02/07/18 (!) 139.6 kg (307 lb 12.8 oz)   02/03/18 (!) 137 kg (302 lb 0.5 oz)   02/01/18 124.7 kg (275 lb)   01/27/18 (!) 135.2 kg (298 lb 1 oz)   01/15/18 (!) 132.5 kg (292 lb)   01/15/18 (!) 132.5 kg (292 lb)   01/06/18 (!) 133.6 kg (294 lb 9.6 oz)   12/28/17 (!) 138.8 kg (306 lb)   11/23/17 (!) 145 kg (319 lb 10.7 oz)   11/23/17 (!) 139 kg (306 lb 7 oz)   11/22/17 (!) 139 kg (306 lb 7 oz)       General appearance of patient: WDWN(+) NAD(+)    EYES  o Fundus : Papilledem(-) Exudates(-) Hemorrhage(-)  Nervous System  Orientation to time, place and person(+)  Memory normal(+)  Language: aphasia(-)  Knowledge: past(+) Current(+)  Attention(+)  Cranial Nerves  • Nerve 2: intact  • Nerve 3,4,6: intact  • Nerve 5 : intact  • Nerve 7: intact  • Nerve 8: intact  • Nerve 9 & 10: intact  • Nerve 11: intact  • Nerve 12: intact  Muscle Power and muscle tone: Muscle strength good in upper limbs  Legs, could not raise above the bed, but the muscle tone is not absent either  Inconsistence in doing muscle tests-- some psychogenic components remains  possible  Reflexes are 1+   Legs are swollen  Gait : unsteady  Heart and Vascular  Peripheral Vasucular system : Edema (-) Swelling(-)  RHB, Breathing sound clear  abdomen bowel sound normoactive  Extremities joints freely moveable  Joints no contracture       NEUROIMAGING: I reviewed the MRI/CT of brain       LAB:  Recent Labs      07/20/18   1021  07/21/18   0438  07/22/18   0439   WBC  13.2*  12.9*  10.6   RBC  5.33  5.54  5.59   HEMOGLOBIN  14.6  15.1  15.6   HEMATOCRIT  45.4  46.1  46.9   MCV  85.2  83.2  83.9   MCH  27.4  27.3  27.9   MCHC  32.2*  32.8*  33.3*   RDW  42.4  41.3  40.8   PLATELETCT  242  212  202   MPV  11.7  11.6  11.6           IMPRESSION:    1. Woke up this morning with paraplegia-- tingling below inguinal regions and could not move legs at all: Similar paraplegia 2 years ago, took him one month to recover-- he went to rehabilitation department and slowly improved    2. Atrial fib this time    3. Lack of social support, lives alone in the past 6 months         MANAGEMENT    ________________________________________________________________________       MRI of whole spine stat showed cervical spondylosis- advise neurosurgeon consultation     Besides cervical stenosis, psychogenic factors may play some roles as well    DR Childress will continue to follow this patient tomorrow     Muscle strength good in upper limbs  Legs, could not raise above the bed, but the muscle tone is not absent either  Inconsistence in doing muscle tests-- some psychogenic components remains possible  Reflexes are 1+   Legs are swollen  Could try steroid if the leg weakness persists  ________________________________________________________________________  Central canal narrowing worst C4-C5-- C6      SIGNATURE:  Yen-Greek Naseem    CC:  Jerad Lomax M.D.    SIGNATURE:  Fanta Gusman    CC:  Jerad Lomax M.D.

## 2018-07-22 NOTE — ASSESSMENT & PLAN NOTE
Etiology unknown , Neurologist consulted , CT lumbar spine negative   MRI only shows moderate stenosis  NSG signed off  Possible malingering  Improved    [place] PENDING

## 2018-07-22 NOTE — PROGRESS NOTES
Pt with total lower extremity paralysis. No sensation and very little movement. Was up self and ambulatory this morning. MD Mooney notified. CT Spine ordered and Neurology consulted. Will continue to monitor

## 2018-07-22 NOTE — PROGRESS NOTES
[facility]    Date of Service: 2018    Chief Complaint  53 y.o. male admitted 2018 with admittted with atrial fibrillation , syncope and chest pain    Interval Problem Update  Patient woke up this morning with lower extremity paralysis     Consultants/Specialty  Cardiology /neurologist     Disposition  TBD         Review of Systems   Constitutional: Negative.  Negative for fever.   HENT: Negative.  Negative for hearing loss.    Eyes: Negative.  Negative for blurred vision.   Respiratory: Negative.  Negative for cough.    Cardiovascular: Negative for chest pain and palpitations.   Gastrointestinal: Negative.  Negative for heartburn.   Genitourinary: Negative.  Negative for dysuria.   Musculoskeletal: Negative.  Negative for myalgias.   Skin: Negative.  Negative for rash.   Neurological: Positive for focal weakness. Negative for sensory change.   Endo/Heme/Allergies: Does not bruise/bleed easily.   Psychiatric/Behavioral: Negative.  Negative for depression.      Physical Exam  Laboratory/Imaging   Hemodynamics  Temp (24hrs), Av.5 °C (97.7 °F), Min:36.1 °C (97 °F), Max:36.9 °C (98.5 °F)   Temperature: 36.7 °C (98 °F)  Pulse  Av.8  Min: 60  Max: 134    Blood Pressure: 129/80      Respiratory      Respiration: 20, Pulse Oximetry: 93 %             Fluids    Intake/Output Summary (Last 24 hours) at 18 1305  Last data filed at 18 0730   Gross per 24 hour   Intake                0 ml   Output              550 ml   Net             -550 ml       Nutrition  Orders Placed This Encounter   Procedures   • Diet Order Cardiac     Standing Status:   Standing     Number of Occurrences:   1     Order Specific Question:   Diet:     Answer:   Cardiac [6]     Physical Exam   Constitutional: He is oriented to person, place, and time. He appears well-developed and well-nourished.   HENT:   Head: Atraumatic.   Neck: Normal range of motion. Neck supple.   Cardiovascular: An irregular rhythm  present.   Pulmonary/Chest: Effort normal and breath sounds normal.   Abdominal: Soft. Bowel sounds are normal.   Musculoskeletal: He exhibits edema.   Unable to move lower extremity b/l    Neurological: He is alert and oriented to person, place, and time.       Recent Labs      07/20/18   1021  07/21/18   0438  07/22/18   0439   WBC  13.2*  12.9*  10.6   RBC  5.33  5.54  5.59   HEMOGLOBIN  14.6  15.1  15.6   HEMATOCRIT  45.4  46.1  46.9   MCV  85.2  83.2  83.9   MCH  27.4  27.3  27.9   MCHC  32.2*  32.8*  33.3*   RDW  42.4  41.3  40.8   PLATELETCT  242  212  202   MPV  11.7  11.6  11.6     Recent Labs      07/20/18   1021  07/21/18   0850  07/22/18   0439   SODIUM  137  134*  134*   POTASSIUM  4.7  4.8  4.6   CHLORIDE  103  102  103   CO2  27  24  28   GLUCOSE  139*  138*  125*   BUN  33*  27*  24*   CREATININE  0.90  0.87  0.91   CALCIUM  9.6  9.7  9.7     Recent Labs      07/20/18   1021   APTT  32.1   INR  1.61*                  Assessment/Plan     * Atrial fibrillation with rapid ventricular response (HCC)- (present on admission)   Assessment & Plan    Recently had decrease in his home metoprolol from 100 mg twice daily to 25 mg twice daily.  Denies any recent dehydration or volume loss.  s/p  Diltiazem drip.  Cardiology on board  Monitor on telemetry  Correct all electrolyte abnormalities.  Had recent echocardiogram with EF of 40%  s/p cardiac cath 7/21/2018 negative for cad   -Will resume Xarelto/ASA  CHADS score of at lest 2  Patient is now rate controlled   -         Paralysis of both lower limbs (HCC)   Assessment & Plan    Etiology unknown , Neurologist consulted , CT lumbar spine negative   MRI pending         Leukocytosis   Assessment & Plan    Probably reactive         Chest pain   Assessment & Plan    Probably due to afib , s/p  cardiac cath 7/21/2018 negative for CAD          Syncope   Assessment & Plan    Likely due to atrial fibrillation with rapid ventricular rate.  Check orthostatic blood  pressures once heart rate has stabilized        Dizziness- (present on admission)   Assessment & Plan    Due to rapid ventricular rate and atrial fibrillation.        HLD (hyperlipidemia)- (present on admission)   Assessment & Plan    Continue with pravastatin for ongoing active treatment        Hypertension- (present on admission)   Assessment & Plan    Monitor vitals.  Continue with outpatient lisinopril I have increased his metoprolol to 50 mg.  Holding outpatient HCTZ        Type 2 diabetes mellitus (HCC)- (present on admission)   Assessment & Plan    Monitor Accu-Cheks and cover with sliding scale insulin.    holding   metformin.  Diabetic diet        Obesity- (present on admission)   Assessment & Plan    History of morbid obesity.  Patient states there is been over 200 pounds with diet and exercise over the last few years.  Needs ongoing continued lifestyle management to get to a ideal body weight.        Euvolemic Hyponatremia- (present on admission)   Assessment & Plan    Better,s/p Gentle IV fluids monitor daily BMP  Likely adverse effect of HCTZ          Quality-Core Measures   Reviewed items::  Medications reviewed, Labs reviewed and Radiology images reviewed  DVT: Xarelto.  DVT prophylaxis - mechanical:  SCDs

## 2018-07-22 NOTE — PROGRESS NOTES
Bedside report received from Salome PÉREZ. Patient's chart and MAR reviewed. 12 hour chart check complete. Pt is awake in bed. Pt is AOx4. Patient was updated on plan of care for the day. Questions answered and concerns addressed.  Pt denies any additional needs at this time. White board updated. Call light and personal belongings within reach, bed in lowest position.

## 2018-07-23 ENCOUNTER — HOME HEALTH ADMISSION (OUTPATIENT)
Dept: HOME HEALTH SERVICES | Facility: HOME HEALTHCARE | Age: 53
End: 2018-07-23
Payer: MEDICAID

## 2018-07-23 ENCOUNTER — APPOINTMENT (OUTPATIENT)
Dept: RADIOLOGY | Facility: MEDICAL CENTER | Age: 53
DRG: 286 | End: 2018-07-23
Attending: PSYCHIATRY & NEUROLOGY
Payer: MEDICAID

## 2018-07-23 LAB
ANION GAP SERPL CALC-SCNC: 9 MMOL/L (ref 0–11.9)
BASOPHILS # BLD AUTO: 0.5 % (ref 0–1.8)
BASOPHILS # BLD: 0.06 K/UL (ref 0–0.12)
BUN SERPL-MCNC: 18 MG/DL (ref 8–22)
CALCIUM SERPL-MCNC: 9.6 MG/DL (ref 8.5–10.5)
CHLORIDE SERPL-SCNC: 102 MMOL/L (ref 96–112)
CO2 SERPL-SCNC: 23 MMOL/L (ref 20–33)
CREAT SERPL-MCNC: 0.79 MG/DL (ref 0.5–1.4)
EOSINOPHIL # BLD AUTO: 0.29 K/UL (ref 0–0.51)
EOSINOPHIL NFR BLD: 2.6 % (ref 0–6.9)
ERYTHROCYTE [DISTWIDTH] IN BLOOD BY AUTOMATED COUNT: 40.1 FL (ref 35.9–50)
GLUCOSE BLD-MCNC: 115 MG/DL (ref 65–99)
GLUCOSE BLD-MCNC: 122 MG/DL (ref 65–99)
GLUCOSE BLD-MCNC: 141 MG/DL (ref 65–99)
GLUCOSE BLD-MCNC: 143 MG/DL (ref 65–99)
GLUCOSE SERPL-MCNC: 123 MG/DL (ref 65–99)
HCT VFR BLD AUTO: 50 % (ref 42–52)
HGB BLD-MCNC: 16.7 G/DL (ref 14–18)
IMM GRANULOCYTES # BLD AUTO: 0.05 K/UL (ref 0–0.11)
IMM GRANULOCYTES NFR BLD AUTO: 0.4 % (ref 0–0.9)
LYMPHOCYTES # BLD AUTO: 2.88 K/UL (ref 1–4.8)
LYMPHOCYTES NFR BLD: 25.8 % (ref 22–41)
MCH RBC QN AUTO: 27.6 PG (ref 27–33)
MCHC RBC AUTO-ENTMCNC: 33.4 G/DL (ref 33.7–35.3)
MCV RBC AUTO: 82.6 FL (ref 81.4–97.8)
MONOCYTES # BLD AUTO: 0.78 K/UL (ref 0–0.85)
MONOCYTES NFR BLD AUTO: 7 % (ref 0–13.4)
NEUTROPHILS # BLD AUTO: 7.11 K/UL (ref 1.82–7.42)
NEUTROPHILS NFR BLD: 63.7 % (ref 44–72)
NRBC # BLD AUTO: 0 K/UL
NRBC BLD-RTO: 0 /100 WBC
PLATELET # BLD AUTO: 207 K/UL (ref 164–446)
PMV BLD AUTO: 11.1 FL (ref 9–12.9)
POTASSIUM SERPL-SCNC: 4.3 MMOL/L (ref 3.6–5.5)
RBC # BLD AUTO: 6.05 M/UL (ref 4.7–6.1)
SODIUM SERPL-SCNC: 134 MMOL/L (ref 135–145)
VIT B12 SERPL-MCNC: 284 PG/ML (ref 211–911)
WBC # BLD AUTO: 11.2 K/UL (ref 4.8–10.8)

## 2018-07-23 PROCEDURE — 770020 HCHG ROOM/CARE - TELE (206)

## 2018-07-23 PROCEDURE — 700102 HCHG RX REV CODE 250 W/ 637 OVERRIDE(OP): Performed by: STUDENT IN AN ORGANIZED HEALTH CARE EDUCATION/TRAINING PROGRAM

## 2018-07-23 PROCEDURE — A9579 GAD-BASE MR CONTRAST NOS,1ML: HCPCS | Performed by: PSYCHIATRY & NEUROLOGY

## 2018-07-23 PROCEDURE — 80048 BASIC METABOLIC PNL TOTAL CA: CPT

## 2018-07-23 PROCEDURE — 72147 MRI CHEST SPINE W/DYE: CPT

## 2018-07-23 PROCEDURE — 36415 COLL VENOUS BLD VENIPUNCTURE: CPT

## 2018-07-23 PROCEDURE — 82525 ASSAY OF COPPER: CPT

## 2018-07-23 PROCEDURE — 72149 MRI LUMBAR SPINE W/DYE: CPT

## 2018-07-23 PROCEDURE — A9270 NON-COVERED ITEM OR SERVICE: HCPCS | Performed by: STUDENT IN AN ORGANIZED HEALTH CARE EDUCATION/TRAINING PROGRAM

## 2018-07-23 PROCEDURE — 82962 GLUCOSE BLOOD TEST: CPT

## 2018-07-23 PROCEDURE — 85025 COMPLETE CBC W/AUTO DIFF WBC: CPT

## 2018-07-23 PROCEDURE — 72142 MRI NECK SPINE W/DYE: CPT

## 2018-07-23 PROCEDURE — 82607 VITAMIN B-12: CPT

## 2018-07-23 PROCEDURE — 99239 HOSP IP/OBS DSCHRG MGMT >30: CPT | Performed by: INTERNAL MEDICINE

## 2018-07-23 PROCEDURE — A9270 NON-COVERED ITEM OR SERVICE: HCPCS | Performed by: HOSPITALIST

## 2018-07-23 PROCEDURE — 700117 HCHG RX CONTRAST REV CODE 255: Performed by: PSYCHIATRY & NEUROLOGY

## 2018-07-23 PROCEDURE — 700102 HCHG RX REV CODE 250 W/ 637 OVERRIDE(OP): Performed by: HOSPITALIST

## 2018-07-23 RX ORDER — LISINOPRIL 5 MG/1
5 TABLET ORAL DAILY
Qty: 30 TAB | Refills: 0 | Status: ON HOLD | OUTPATIENT
Start: 2018-07-24 | End: 2018-09-14

## 2018-07-23 RX ADMIN — PRAVASTATIN SODIUM 20 MG: 20 TABLET ORAL at 16:59

## 2018-07-23 RX ADMIN — METOPROLOL TARTRATE 25 MG: 25 TABLET, FILM COATED ORAL at 16:59

## 2018-07-23 RX ADMIN — GADODIAMIDE 20 ML: 287 INJECTION INTRAVENOUS at 11:01

## 2018-07-23 RX ADMIN — RIVAROXABAN 20 MG: 20 TABLET, FILM COATED ORAL at 16:59

## 2018-07-23 RX ADMIN — ASPIRIN 81 MG: 81 TABLET, CHEWABLE ORAL at 05:17

## 2018-07-23 RX ADMIN — LISINOPRIL 5 MG: 5 TABLET ORAL at 05:16

## 2018-07-23 RX ADMIN — STANDARDIZED SENNA CONCENTRATE AND DOCUSATE SODIUM 2 TABLET: 8.6; 5 TABLET, FILM COATED ORAL at 05:16

## 2018-07-23 RX ADMIN — FAMOTIDINE 20 MG: 20 TABLET ORAL at 16:59

## 2018-07-23 RX ADMIN — METOPROLOL TARTRATE 25 MG: 25 TABLET, FILM COATED ORAL at 05:16

## 2018-07-23 RX ADMIN — FAMOTIDINE 20 MG: 20 TABLET ORAL at 05:16

## 2018-07-23 ASSESSMENT — PAIN SCALES - GENERAL
PAINLEVEL_OUTOF10: 0

## 2018-07-23 NOTE — PROGRESS NOTES
Assumed care at 0700, bedside report received from night shift RN. Pt is AOx4. Pt is A fib 68 on the monitor. Initial assessment completed, orders reviewed, call light within reach, bed alarm in use, and hourly rounding in place. POC addressed with patient, no additional questions at this time.

## 2018-07-23 NOTE — DISCHARGE PLANNING
Anticipated Discharge Disposition: Home with HH    Action: Received Choice from Patient.  Choice: 1. [facility]. LSW faxed choice to ALY Stevens    Barriers to Discharge: HH acceptance.    Plan: Patient to d/c home with HH.

## 2018-07-23 NOTE — DISCHARGE SUMMARY
DATE OF ADMISSION:  07/19/2018    DATE OF DISCHARGE:  07/23/2018    FINAL DIAGNOSES:  1.  Acute on chronic atrial fibrillation with rapid ventricular response, resolved.  2.  Chest pain, probably due to atrial fibrillation with rapid ventricular   response, resolved (patient is status post cardiac catheterization on   07/21/2018.  Negative for coronary artery disease).  3.  Syncope episode, resolved (due to atrial fibrillation with rapid   ventricular response).  4.  Reactive leukocytosis, resolved.  5.  Questionable paralysis of the lower extremities, resolved.  6.  Chronic history of dyslipidemia, hypertension, type 2 diabetes, obesity,   euvolemic hyponatremia, stable.  7.  Patient had mild systolic congestive heart failure with ejection fraction   of 40%-45%.    HOSPITAL COURSE:  Patient is a 53-year-old gentleman with past medical history   of type 2 diabetes,afib, hypertension and dyslipidemia.  The patient presented to   the hospital after having chest pain, syncope episode and atrial fibrillation.    The patient was admitted as first observation to the hospital, eventually   transitioned to inpatient as well as atrial fibrillation with RVR.  The   patient was treated with diltiazem drip.  Cardiology was consulted, eventually   transitioned to oral medications.  He is still in atrial fibrillation, but   rate controlled.  His CHADS2 score is at least 2.  The patient is to resume   his home dose of Xarelto and aspirin.  He had chest pain prior to   presentation, so the patient was taken for cardiac catheterization on   07/21/2018, it was negative as far as a syncope episode, we felt that it is   due to atrial fibrillation with RVR, which is rate controlled at this time.    Has some reactive leukocytosis that has resolved.  Has some hyponatremia   euvolemic, it is better status post hydration.  However, it is the adverse   effect of hydrochlorothiazide which was discontinued on this hospital stay.    For  obesity, patient was counseled about diet and exercise.  Dyslipidemia,   stable on this hospital stay.  Right before the patient was discharged home,   patient complained of paralysis of the lower extremities.  So, neurologist was   consulted.  A CT lumbar spine was done that was negative and then he had an   MRI done of the thoracolumbar, cervical spine that only shows degenerative   joint disease.  Neurosurgeon was also consulted.  Also, both neurology and   neurosurgeon cleared the patient.  Patient is going to be going home today.    He is able to move his extremities without any problem.  He is only to follow   up with his primary care doctor within 1 week of discharge from the hospital.    Also, need to follow up with his outpatient cardiologist.    CONSULTANTS ON THIS HOSPITAL STAY:  1.  Dr. Gusman, neurology.  2.  Cardiologist, Dr. Edwards.  3.  Neurosurgeon, Dr. Taveras.    PROCEDURE DONE ON THIS HOSPITAL STAY:  Cardiac catheterization.  Most recent   labs are as follows:  CBC shows white cell of 11.2, hemoglobin 16.7,   hematocrit 50.0 and platelet of 207.  Chemistry shows sodium of 134, potassium   4.3, glucose of 123, BUN of 18 and creatinine 0.79.  Rest of the labs is   unremarkable.    IMAGING STUDIES DONE ON THIS HOSPITAL STAY:  CT of the head without contrast   done on admission unremarkable.  CT of the lumbar spine shows no acute   fracture.  MRI of the thoracic spine shows degenerative joint disease with   mild thoracic spondylosis and then MRI of the thoracic spine with contrast did   not show any contrast-enhancing lesion and then MRI of lumbar spine negative   for any contrast and has a lesion in the lumbar spine.  Lumbar spine MRI   without contrast shows just some degenerative joint disease along with a   wonderful cervical spine.  Doppler of the carotid shows no hemodynamically   significant stenosis, then Doppler of the lower extremities bilaterally was   negative for DVT.  Echocardiogram done  on this patient shows ejection fraction   of 40%-45%.    DISCHARGE MEDICATIONS:  The following new prescription was given to the   patient, they are as follows.  1.  Lisinopril 5 mg p.o. daily.  2.  Metoprolol 25 mg p.o. b.i.d.    Patient is to resume the following chronic home medications:  1.  Tylenol 650 mg p.o. q. 6 hours p.r.n. for mild pain and fever.  2.  Aspirin 81 mg enteric-coated p.o. daily.  3.  Famotidine 20 mg p.o. b.i.d.  4.  Metformin 500 mg p.o. b.i.d.  5.  Pravastatin 20 mg p.o. daily.  6.  Xarelto 20 mg p.o. daily with dinner.    Home dose of hydrochlorothiazide was discontinued.    DISPOSITION:  To home with home health and PT, OT.    DIET:  ADA and cardiac diet.    ACTIVITIES:  As tolerated.    CONDITION AT THE TIME OF DISCHARGE:  Patient is medically stable.  Follow up   instruction as indicated above.    INSTRUCTIONS:  Patient was instructed to return to ER in the event of   worsening of symptoms.  I have counseled the patient on importance of   compliance and patient has agreed to proceed with all medical recommendation.    FOLLOWUP PLAN:  As indicated above.  Patient understands all medication comes   with benefits and risks, risks may include permanent injury or death and these   risks can be minimized with close reassessment and monitoring.    Greater than 35 minutes was spent on coordination and planning of patient   discharge.       ____________________________________     MD JANE RIVERA / NOEMY    DD:  07/23/2018 15:41:52  DT:  07/23/2018 16:30:32    D#:  [id number]  Job#:  [id number]

## 2018-07-23 NOTE — DISCHARGE PLANNING
Received Choice form at [phone]  Agency/Facility Name: [facility]  Referral sent per Choice form @ [phone]

## 2018-07-23 NOTE — CARE PLAN
Problem: Safety  Goal: Will remain free from falls  Outcome: PROGRESSING AS EXPECTED  Fall precautions in place.

## 2018-07-23 NOTE — PROGRESS NOTES
Report received from day shift. Patient is resting in bed with no complaints or needs at this time. Patient still is unable to fell his lower extremities, but was able to wiggle his toes on both feet. POC reviewed with patient and no questions at this time. Bed locked in lowest position and call light is within reach.

## 2018-07-23 NOTE — CARE PLAN
Problem: Communication  Goal: The ability to communicate needs accurately and effectively will improve  Outcome: PROGRESSING AS EXPECTED  Pt educated on POC for the shift. Pt encouraged to voice concerns and ask questions. Pt verbalizes understanding. Pt uses call light appropriately. Pt has no further questions at this time.     Problem: Skin Integrity  Goal: Risk for impaired skin integrity will decrease  Outcome: PROGRESSING AS EXPECTED  Mepilex in place. Pillows in place to float heels. Pt encouraged to turn self. Pt educated on skin breakdown risk. Pt verbalizes understanding.

## 2018-07-23 NOTE — DISCHARGE PLANNING
We need an updated physical address and phone number of the patient to process referral accordingly.  A message has been left on ALY Stevens's voicemail.    Thanks!

## 2018-07-23 NOTE — CONSULTS
DATE OF SERVICE:  07/23/2018    NEUROSURGICAL CONSULTATION    HISTORY OF PRESENT ILLNESS:  The patient is a 53-year-old male who was   admitted to the hospital on 07/19 for paroxysmal AFib, had been on chronic   anticoagulation and felt that he passed out and is felt his heart was racing.    Neurosurgical consultation was requested today.  The patient complained that   yesterday without antecedent event, he lost full control of both of his legs.    He said that he had one episode like this 2 years ago and another   approximately 2 weeks ago, did not involve his arms at all.  He has no   numbness, tingling, or weakness in either of his arms.  He said he was unable   to move both of his legs.    PAST MEDICAL HISTORY:  Atrial fibrillation, chest pain, diabetes, diabetic   neuropathy, hypercholesterolemia, hypertension, morbid obesity, noncompliance.    PAST SURGICAL HISTORY:  Right great toe amputation, second toe amputation, I   and D.    FAMILY HISTORY:  Noncontributory.    SOCIAL HISTORY:  No smoking, no tobacco, no EtOH, no use of drugs.    ALLERGIES:  DAPTOMYCIN, PENICILLIN, UNASYN, AND VANCOMYCIN.    MEDICATIONS HERE IN THE HOSPITAL:  Aspirin, famotidine, insulin sliding scale,   lisinopril, metoprolol, pravastatin, Xarelto, bowel protocol, p.r.n. Tylenol,   Decadron, Benadryl, insulin, Haldol, Zofran, Compazine, Phenergan,   scopolamine patch.    PHYSICAL EXAMINATION:  GENERAL:  Awake, alert and oriented x3.  GCS of 15.  No distress.  HEENT:  Pupils equal, round, reactive to light.  Extraocular muscles intact.    Tongue midline.  Face symmetric.  EXTREMITIES:  Upper extremities are 5/5 with exception of left-sided triceps,   which is 4/5.   and intrinsics are 5/5 in the lower extremities.    Iliopsoas is 4-4+, quad is 4-4+.  TA, EHL, and GS are at least 4+, if not 5.    Sensory is decreased in a nondermatomal distribution.  He has no sensory   level.  Reflexes are diffusely hypoactive in the upper and lower  extremities   and symmetric.  No clonus.  No Alanis's.    IMAGING:  MRI of the cervical spine shows multilevel degenerative changes,   worst at cervical 4-5 and 5-6, where he has severe central canal stenosis.    MRI of the cervical spine also shows multilevel degenerative changes.  MRI of   the thoracic spine also on 07/22 shows multilevel, very minimally dilated   central canal, not consistent with syrinx.  He has no significant central   canal stenosis at any level.  MRI of the lumbar spine shows multilevel   degenerative changes, but no significant central or foraminal stenosis at any   level.    PLAN:  The patient has severe cervical central canal stenosis at cervical 4-5   and 5-6.  However, he has no involvement of his hands or his arms, except for   some weakness on the left at C6-C7 with his triceps and he has good lower   extremity motor function and sensory level.  Neurosurgery will sign off at   this point.  There is nothing to do for this patient surgically, as he has no   evidence of myelopathy and has no concordance of any of his symptoms with his   imaging findings.  Please call with questions.       ____________________________________     COLLINS BURGESS MD    CPD / NTS    DD:  07/23/2018 12:20:26  DT:  07/23/2018 12:39:16    D#:  [id number]  Job#:  [id number]

## 2018-07-23 NOTE — FACE TO FACE
Face to Face Supporting Documentation - Home Health    The encounter with this patient was in whole or in part the primary reason for home health admission.    Date of encounter:   Patient:                    MRN:                       YOB: 2020  Sebastián Castro  [id number]  1965     Home health to see patient for:  Physical Therapy evaluation and treatment, Occupational therapy evaluation and treatment and Comment: Homehealth    Skilled need for: Home health      Skilled nursing interventions to include:  Comment: Home health    Homebound status evidenced by:  Needs the assistance of another person in order to leave the home. Leaving home requires a considerable and taxing effort. There is a normal inability to leave the home.    Community Physician to provide follow up care: Jerad Lomax M.D.     Optional Interventions? No      I certify the face to face encounter for this home health care referral meets the CMS requirements and the encounter/clinical assessment with the patient was, in whole, or in part, for the medical condition(s) listed above, which is the primary reason for home health care. Based on my clinical findings: the service(s) are medically necessary, support the need for home health care, and the homebound criteria are met.  I certify that this patient has had a face to face encounter by myself.  Anmol Mooney M.D. - NPI: [id number]

## 2018-07-24 PROBLEM — E53.8 VITAMIN B12 DEFICIENCY: Status: ACTIVE | Noted: 2018-07-24

## 2018-07-24 LAB
ANION GAP SERPL CALC-SCNC: 8 MMOL/L (ref 0–11.9)
BASOPHILS # BLD AUTO: 0.6 % (ref 0–1.8)
BASOPHILS # BLD: 0.06 K/UL (ref 0–0.12)
BUN SERPL-MCNC: 21 MG/DL (ref 8–22)
CALCIUM SERPL-MCNC: 9.8 MG/DL (ref 8.5–10.5)
CHLORIDE SERPL-SCNC: 101 MMOL/L (ref 96–112)
CO2 SERPL-SCNC: 24 MMOL/L (ref 20–33)
CREAT SERPL-MCNC: 0.84 MG/DL (ref 0.5–1.4)
EOSINOPHIL # BLD AUTO: 0.22 K/UL (ref 0–0.51)
EOSINOPHIL NFR BLD: 2.2 % (ref 0–6.9)
ERYTHROCYTE [DISTWIDTH] IN BLOOD BY AUTOMATED COUNT: 40.8 FL (ref 35.9–50)
GLUCOSE BLD-MCNC: 116 MG/DL (ref 65–99)
GLUCOSE BLD-MCNC: 122 MG/DL (ref 65–99)
GLUCOSE BLD-MCNC: 122 MG/DL (ref 65–99)
GLUCOSE BLD-MCNC: 127 MG/DL (ref 65–99)
GLUCOSE SERPL-MCNC: 119 MG/DL (ref 65–99)
HCT VFR BLD AUTO: 51 % (ref 42–52)
HGB BLD-MCNC: 17.2 G/DL (ref 14–18)
IMM GRANULOCYTES # BLD AUTO: 0.04 K/UL (ref 0–0.11)
IMM GRANULOCYTES NFR BLD AUTO: 0.4 % (ref 0–0.9)
LYMPHOCYTES # BLD AUTO: 2.71 K/UL (ref 1–4.8)
LYMPHOCYTES NFR BLD: 27 % (ref 22–41)
MCH RBC QN AUTO: 28.2 PG (ref 27–33)
MCHC RBC AUTO-ENTMCNC: 33.7 G/DL (ref 33.7–35.3)
MCV RBC AUTO: 83.5 FL (ref 81.4–97.8)
MONOCYTES # BLD AUTO: 0.85 K/UL (ref 0–0.85)
MONOCYTES NFR BLD AUTO: 8.5 % (ref 0–13.4)
NEUTROPHILS # BLD AUTO: 6.14 K/UL (ref 1.82–7.42)
NEUTROPHILS NFR BLD: 61.3 % (ref 44–72)
NRBC # BLD AUTO: 0 K/UL
NRBC BLD-RTO: 0 /100 WBC
PLATELET # BLD AUTO: 211 K/UL (ref 164–446)
PMV BLD AUTO: 11.4 FL (ref 9–12.9)
POTASSIUM SERPL-SCNC: 4.2 MMOL/L (ref 3.6–5.5)
RBC # BLD AUTO: 6.11 M/UL (ref 4.7–6.1)
SODIUM SERPL-SCNC: 133 MMOL/L (ref 135–145)
WBC # BLD AUTO: 10 K/UL (ref 4.8–10.8)

## 2018-07-24 PROCEDURE — 80048 BASIC METABOLIC PNL TOTAL CA: CPT

## 2018-07-24 PROCEDURE — G8979 MOBILITY GOAL STATUS: HCPCS | Mod: CI

## 2018-07-24 PROCEDURE — 700102 HCHG RX REV CODE 250 W/ 637 OVERRIDE(OP): Performed by: HOSPITALIST

## 2018-07-24 PROCEDURE — 700102 HCHG RX REV CODE 250 W/ 637 OVERRIDE(OP): Performed by: STUDENT IN AN ORGANIZED HEALTH CARE EDUCATION/TRAINING PROGRAM

## 2018-07-24 PROCEDURE — 99232 SBSQ HOSP IP/OBS MODERATE 35: CPT | Performed by: HOSPITALIST

## 2018-07-24 PROCEDURE — G8988 SELF CARE GOAL STATUS: HCPCS | Mod: CI

## 2018-07-24 PROCEDURE — 770020 HCHG ROOM/CARE - TELE (206)

## 2018-07-24 PROCEDURE — A9270 NON-COVERED ITEM OR SERVICE: HCPCS | Performed by: STUDENT IN AN ORGANIZED HEALTH CARE EDUCATION/TRAINING PROGRAM

## 2018-07-24 PROCEDURE — A9270 NON-COVERED ITEM OR SERVICE: HCPCS | Performed by: HOSPITALIST

## 2018-07-24 PROCEDURE — G8978 MOBILITY CURRENT STATUS: HCPCS | Mod: CJ

## 2018-07-24 PROCEDURE — 82962 GLUCOSE BLOOD TEST: CPT | Mod: 91

## 2018-07-24 PROCEDURE — G8987 SELF CARE CURRENT STATUS: HCPCS | Mod: CJ

## 2018-07-24 PROCEDURE — 85025 COMPLETE CBC W/AUTO DIFF WBC: CPT

## 2018-07-24 PROCEDURE — 97162 PT EVAL MOD COMPLEX 30 MIN: CPT

## 2018-07-24 PROCEDURE — 36415 COLL VENOUS BLD VENIPUNCTURE: CPT

## 2018-07-24 PROCEDURE — 97165 OT EVAL LOW COMPLEX 30 MIN: CPT

## 2018-07-24 RX ORDER — CHOLECALCIFEROL (VITAMIN D3) 125 MCG
1000 CAPSULE ORAL DAILY
Status: DISCONTINUED | OUTPATIENT
Start: 2018-07-24 | End: 2018-07-25 | Stop reason: HOSPADM

## 2018-07-24 RX ADMIN — FAMOTIDINE 20 MG: 20 TABLET ORAL at 05:41

## 2018-07-24 RX ADMIN — METOPROLOL TARTRATE 25 MG: 25 TABLET, FILM COATED ORAL at 17:00

## 2018-07-24 RX ADMIN — FAMOTIDINE 20 MG: 20 TABLET ORAL at 17:00

## 2018-07-24 RX ADMIN — LISINOPRIL 5 MG: 5 TABLET ORAL at 05:41

## 2018-07-24 RX ADMIN — CYANOCOBALAMIN TAB 500 MCG 1000 MCG: 500 TAB at 09:11

## 2018-07-24 RX ADMIN — PRAVASTATIN SODIUM 20 MG: 20 TABLET ORAL at 17:00

## 2018-07-24 RX ADMIN — ASPIRIN 81 MG: 81 TABLET, CHEWABLE ORAL at 05:40

## 2018-07-24 RX ADMIN — METOPROLOL TARTRATE 25 MG: 25 TABLET, FILM COATED ORAL at 05:40

## 2018-07-24 RX ADMIN — RIVAROXABAN 20 MG: 20 TABLET, FILM COATED ORAL at 17:00

## 2018-07-24 ASSESSMENT — ENCOUNTER SYMPTOMS
COUGH: 0
PALPITATIONS: 0
MYALGIAS: 0
HEARTBURN: 0
DEPRESSION: 0
FOCAL WEAKNESS: 1
SENSORY CHANGE: 0
BLURRED VISION: 0
FEVER: 0
CHILLS: 0

## 2018-07-24 ASSESSMENT — COGNITIVE AND FUNCTIONAL STATUS - GENERAL
SUGGESTED CMS G CODE MODIFIER MOBILITY: CK
STANDING UP FROM CHAIR USING ARMS: A LITTLE
DAILY ACTIVITIY SCORE: 18
TOILETING: A LITTLE
DRESSING REGULAR UPPER BODY CLOTHING: A LITTLE
DRESSING REGULAR LOWER BODY CLOTHING: A LITTLE
TURNING FROM BACK TO SIDE WHILE IN FLAT BAD: A LITTLE
HELP NEEDED FOR BATHING: A LITTLE
PERSONAL GROOMING: A LITTLE
CLIMB 3 TO 5 STEPS WITH RAILING: TOTAL
MOBILITY SCORE: 15
MOVING FROM LYING ON BACK TO SITTING ON SIDE OF FLAT BED: A LOT
SUGGESTED CMS G CODE MODIFIER DAILY ACTIVITY: CK
WALKING IN HOSPITAL ROOM: A LITTLE
MOVING TO AND FROM BED TO CHAIR: A LITTLE
EATING MEALS: A LITTLE

## 2018-07-24 ASSESSMENT — GAIT ASSESSMENTS
DISTANCE (FEET): 5
ASSISTIVE DEVICE: FRONT WHEEL WALKER
GAIT LEVEL OF ASSIST: CONTACT GUARD ASSIST

## 2018-07-24 ASSESSMENT — PAIN SCALES - GENERAL
PAINLEVEL_OUTOF10: 0
PAINLEVEL_OUTOF10: 5
PAINLEVEL_OUTOF10: 0
PAINLEVEL_OUTOF10: 0

## 2018-07-24 ASSESSMENT — ACTIVITIES OF DAILY LIVING (ADL): TOILETING: INDEPENDENT

## 2018-07-24 NOTE — DISCHARGE PLANNING
Agency/Facility Name: [facility]  Correspondence  Outcome: Patient declined due to Non-contracted insurance provider

## 2018-07-24 NOTE — THERAPY
Physical Therapy Evaluation completed.   Bed Mobility:  Supine to Sit: Supervised  Transfers: Sit to Stand: Supervised  Gait: Level Of Assist: Contact Guard Assist with Front-Wheel Walker       Plan of Care: Will benefit from Physical Therapy 3 times per week  Discharge Recommendations: Equipment: No Equipment Needed.     Mr. Castro is a 54 y/o male who presented to acute secondary to syncope and atrial fibrillation. After being notified of impending discharge then reported complete lower body paralysis without triggering event. Neurosurgery evaluated and reported no reason for this sudden paralysis. Based on physical therapy assessment it appears to be behavioral based. Pt reports non use of his LEs, however if distracted and then given gross motor cue will perform mobility without issue. Upon standing with FWW he demonstrated continual tremors of LEs with intermittent knee buckling, however no overt LOB. Once distracted and asked to perform task (tasks that were actually more difficult than static standing) like marching and side stepping pt's tremors and buckling ceased. With marching and side steps was able to perform SLS on each leg without buckling episode and without LOB. Based on this suspect behavioral cause of deficits. As it is behavioral in nature, high risk for falls as pt appears to believe if he falls he will be able to prolong his hospital stay. Interesting enough, as patient is known to therapist from prior admits, he ceased complaining of paraplegia and began describing knee pain in L knee which was one of his deficits with this therapist during a prior admission.  Educated pt that neurosurgery has cleared him and the likely reason for his knee pain is his pronlonged bedrest. Discussed therex to decrease pain. Anticipate once pt desires to go home he will begin mobilizing without issue. Pt demonstrates weakness when he believes he is being tested, however when performing gross motor movements had  "adequate strength in all musulature in his lower extremities. Acute PT to continue to follow while in house to decrease risk for falls and assist with DC planning as needed.     See \"Rehab Therapy-Acute\" Patient Summary Report for complete documentation.     "

## 2018-07-24 NOTE — PROGRESS NOTES
"Pt continues to state he does not have any sensation in his lower extremities. However, pt is able to wiggle toes, lift legs, and push/pull against resistance on command but does so with poor effort as he states \"I can't move or feel my legs.\" When pt is not asked to perform commands and is not being assessed, he is turning in bed, bending and lifting legs, and moving without any difficulty while in bed. Per night shift RNSara, pt was able to stand on scale to get daily weight without any issues. PT and OT evaluated pt this AM and discussed that this appears to behavioral. Pt requesting to go home with rehab, rather than home health. Discussed this with social work and determining if this is appropriate for pt.   "

## 2018-07-24 NOTE — CARE PLAN
Problem: Safety  Goal: Will remain free from falls  Outcome: PROGRESSING AS EXPECTED  Bed locked and in lowest position, call light and personal belongings in reach. Non skid socks on. Purposeful rounding. Up with two person assistance when getting up out of bed. PT/OT to evaluate patient. Bed alarm on. Patient demonstrates proper use of call light. Patient complains of numbness/tingling to BLE but is able to lift both legs off of the bed.       Problem: Skin Integrity  Goal: Risk for impaired skin integrity will decrease  Outcome: PROGRESSING AS EXPECTED  Patient encouraged to reposition self in bed. Mepilex applied to coccyx, area is pink/red but blanching. Positioned with pillows to alleviate pressure points.

## 2018-07-24 NOTE — DISCHARGE PLANNING
ATTN: Case Management  RE: Referral for Home Health                We would like to take this opportunity to thank you for submitting a referral for your patient to continue care with [facility]. Our skilled team is dedicated to helping all patients recover and gain independence in the home setting.            As of 07/24/2018, we have accepted the above patient into our service. A [facility] clinician will be out to see the patient within 48 hours to conduct our initial visit. If you have any questions or concerns regarding the patient’s transition to Home Health, please do not hesitate to contact us. We are open for referrals 7 days a week from 8AM to 5PM at [phone].      We look forward to collaborating with you,  [facility] Team

## 2018-07-24 NOTE — DISCHARGE SUMMARY
Addendum  Discharge date 7/25/2018    Patient was also found to have vitamin B12 deficiency with low normal vitamin B12  Started on oral vitamin B12 replacement    Continued to be weak so sent to SNF instead of .

## 2018-07-24 NOTE — DISCHARGE PLANNING
Agency/Facility Name: [facility]  Spoke To: Roxanne  Outcome: Need updated address and phone number, LSW Stacy was notified.

## 2018-07-24 NOTE — DISCHARGE PLANNING
Received Choice form at [phone]  [facility]  Referral sent per Choice form @ [phone]

## 2018-07-24 NOTE — PROGRESS NOTES
Report received from off going RN. Plan of care reviewed with patient and all questions answered. Bed locked and in lowest position, call light and personal belongings in reach. Non skid socks on. Purposeful rounding. Bed alarm on. Education provided on importance of repositioning self in bed to prevent pressure ulcers. Patient verbalized understanding and is currently lying on right side. Patient able to lift both legs off of the bed and move toes but does complain of numbness and tingling but states he can feel me touching his feet.

## 2018-07-24 NOTE — THERAPY
"Occupational Therapy Evaluation completed.   Functional Status:  Pt is a 52 y/o male admitted with afib and syncope. 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 He is known to this therapist from prior admits. He is pleasant and cooperative, however giving different presentations of deficits throughout session. He was at a SBA level for bed mobility, sit<>stand with FWW. Able to don/doff socks with supervision seated EOB by bringing his legs up onto the bed. He was unable to take steps when asked formally, however when asked to side step towards the head of bed for better positioning when he laid down, he was able to do this with SBA. He is currently most limited by behavior and his inability to progress despite appearing to be capable. Discussed case with SW and RN.   Plan of Care: Will benefit from Occupational Therapy 3 times per week  Discharge Recommendations:  Equipment: Will Continue to Assess for Equipment Needs. Likely home health once he continues to progress.     See \"Rehab Therapy-Acute\" Patient Summary Report for complete documentation.    "

## 2018-07-24 NOTE — DISCHARGE PLANNING
"Care Transition Team Assessment    Discussed pt's case with PT who evaluated pt this am and it appears pt's deficits are \"behavioral based\" and perform mobility without issue during evaluation when given gross motor cue. Please refer to PT note dated today for further details. Met with pt at completed assessment at bedside. Pt states he lives a lone in [place], he uses the bus for transportation and states he is aware of MTM. Pt states he has a WC at home, does his own grocery shopping. Pt sates he has a neighbor who does help if needed. Pt states he has been to [facility] in the past for rehab. Pt expressed concerns about falling at home. Pt confirmed his home address: [facility] [address] and pt states phone# on file is old and correct phone# is [phone]. Pt confirmed PCP is Dr. Jerad Lomax and pt has [place] Medicaid for insurance. Discussed importance of having HHC come in and being available for them to arrange for visits, pt expressed understanding.     Updated [facility] on pt's address and phone# for [facility]. Per [facility], [facility] has accepted pt.     Discussed pt's case with bedside RN.     Needs:   [facility] has accepted pt. Will discuss with attending MD in MDT rounds today regarding d/c planning and POC.    Information Source  Orientation : Oriented x 4  Information Given By: Patient  Informant's Name: Sebastián  Who is responsible for making decisions for patient? : Patient    Readmission Evaluation  Is this a readmission?: Yes - unplanned readmission    Elopement Risk  Legal Hold: No  Ambulatory or Self Mobile in Wheelchair: Yes  Disoriented: No  Psychiatric Symptoms: None  History of Wandering: No  Elopement this Admit: No  Vocalizing Wanting to Leave: No  Displays Behaviors, Body Language Wanting to Leave: No-Not at Risk for Elopement  Elopement Risk: Not at Risk for Elopement    Interdisciplinary Discharge Planning  Primary Care Physician: Jerad Lomax  Lives with - Patient's Self Care Capacity: Friends  Patient or legal guardian " wants to designate a caregiver (see row info): No  Housing / Facility: [address]  Prior Services: None  Durable Medical Equipment: Walker    Discharge Preparedness  What is your plan after discharge?: Uncertain - pending medical team collaboration  What are your discharge supports?: Other (comment) (neighbors)  Prior Functional Level: Ambulatory, Independent with Activities of Daily Living, Independent with Medication Management, Uses Walker  Difficulity with ADLs: None  Difficulity with IADLs: Driving (pt aware of MTM transport benefit and states he uses [employer] bus)    Functional Assesment  Prior Functional Level: Ambulatory, Independent with Activities of Daily Living, Independent with Medication Management, Uses Walker    Finances  Financial Barriers to Discharge: No  Prescription Coverage: Yes    Vision / Hearing Impairment  Vision Impairment : No  Hearing Impairment : No    Values / Beliefs / Concerns  Values / Beliefs Concerns : No         Domestic Abuse  Have you ever been the victim of abuse or violence?: No  Physical Abuse or Sexual Abuse: No  Verbal Abuse or Emotional Abuse: No  Possible Abuse Reported to:: Not Applicable         Discharge Risks or Barriers  Discharge risks or barriers?: Complex medical needs, Lives alone, no community support  Patient risk factors: Lack of outside supports, Readmission, Complex medical needs    Anticipated Discharge Information  Anticipated discharge disposition: [facility]  Discharge Address: [facility] [place] [address]  Discharge Contact Phone Number: [phone]

## 2018-07-24 NOTE — CARE PLAN
Problem: Safety  Goal: Will remain free from falls  Outcome: PROGRESSING AS EXPECTED  Pt up x2. Pt educated to use call light and calls for assistance appropriately. Bed in lowest position and locked. Pt refusing nonskid socks. Pt remains free from falls at this time.    Problem: Discharge Barriers/Planning  Goal: Patient's continuum of care needs will be met  Outcome: PROGRESSING AS EXPECTED  POC discussed with pt. Awaiting pt/ot eval before discharge. Working with SW to get home health for pt. Pt actively participating in dc plan and updated on plan. Pt does not have any additional questions at this time.

## 2018-07-24 NOTE — PROGRESS NOTES
Assumed care at 0700, bedside report received from night shift RNSara. Pt is AOx4 with no signs of distress. Pt is A fib 73 on the monitor. Initial assessment completed, orders reviewed, call light within reach, and hourly rounding in place. POC addressed with patient, no additional questions at this time.

## 2018-07-24 NOTE — DISCHARGE PLANNING
Agency/Facility Name: [facility]  Spoke To: Shirlene  Outcome: Gave updated phone number for patient.

## 2018-07-25 VITALS
RESPIRATION RATE: 16 BRPM | DIASTOLIC BLOOD PRESSURE: 77 MMHG | TEMPERATURE: 97.8 F | BODY MASS INDEX: 34.88 KG/M2 | OXYGEN SATURATION: 99 % | SYSTOLIC BLOOD PRESSURE: 110 MMHG | WEIGHT: 257.5 LBS | HEIGHT: 72 IN | HEART RATE: 68 BPM

## 2018-07-25 LAB
ANION GAP SERPL CALC-SCNC: 10 MMOL/L (ref 0–11.9)
BASOPHILS # BLD AUTO: 0.6 % (ref 0–1.8)
BASOPHILS # BLD: 0.07 K/UL (ref 0–0.12)
BUN SERPL-MCNC: 23 MG/DL (ref 8–22)
CALCIUM SERPL-MCNC: 9.4 MG/DL (ref 8.5–10.5)
CHLORIDE SERPL-SCNC: 101 MMOL/L (ref 96–112)
CO2 SERPL-SCNC: 21 MMOL/L (ref 20–33)
CREAT SERPL-MCNC: 0.79 MG/DL (ref 0.5–1.4)
EOSINOPHIL # BLD AUTO: 0.26 K/UL (ref 0–0.51)
EOSINOPHIL NFR BLD: 2.1 % (ref 0–6.9)
ERYTHROCYTE [DISTWIDTH] IN BLOOD BY AUTOMATED COUNT: 38.6 FL (ref 35.9–50)
GLUCOSE BLD-MCNC: 116 MG/DL (ref 65–99)
GLUCOSE BLD-MCNC: 121 MG/DL (ref 65–99)
GLUCOSE BLD-MCNC: 168 MG/DL (ref 65–99)
GLUCOSE SERPL-MCNC: 151 MG/DL (ref 65–99)
HCT VFR BLD AUTO: 49.1 % (ref 42–52)
HGB BLD-MCNC: 16.8 G/DL (ref 14–18)
IMM GRANULOCYTES # BLD AUTO: 0.06 K/UL (ref 0–0.11)
IMM GRANULOCYTES NFR BLD AUTO: 0.5 % (ref 0–0.9)
LYMPHOCYTES # BLD AUTO: 3.56 K/UL (ref 1–4.8)
LYMPHOCYTES NFR BLD: 28.7 % (ref 22–41)
MCH RBC QN AUTO: 27.5 PG (ref 27–33)
MCHC RBC AUTO-ENTMCNC: 34.2 G/DL (ref 33.7–35.3)
MCV RBC AUTO: 80.2 FL (ref 81.4–97.8)
MONOCYTES # BLD AUTO: 1.1 K/UL (ref 0–0.85)
MONOCYTES NFR BLD AUTO: 8.9 % (ref 0–13.4)
NEUTROPHILS # BLD AUTO: 7.36 K/UL (ref 1.82–7.42)
NEUTROPHILS NFR BLD: 59.2 % (ref 44–72)
NRBC # BLD AUTO: 0 K/UL
NRBC BLD-RTO: 0 /100 WBC
PLATELET # BLD AUTO: 162 K/UL (ref 164–446)
PMV BLD AUTO: 12.4 FL (ref 9–12.9)
POTASSIUM SERPL-SCNC: 4.4 MMOL/L (ref 3.6–5.5)
RBC # BLD AUTO: 6.12 M/UL (ref 4.7–6.1)
SODIUM SERPL-SCNC: 132 MMOL/L (ref 135–145)
WBC # BLD AUTO: 12.4 K/UL (ref 4.8–10.8)

## 2018-07-25 PROCEDURE — 80048 BASIC METABOLIC PNL TOTAL CA: CPT

## 2018-07-25 PROCEDURE — 700102 HCHG RX REV CODE 250 W/ 637 OVERRIDE(OP): Performed by: HOSPITALIST

## 2018-07-25 PROCEDURE — A9270 NON-COVERED ITEM OR SERVICE: HCPCS | Performed by: STUDENT IN AN ORGANIZED HEALTH CARE EDUCATION/TRAINING PROGRAM

## 2018-07-25 PROCEDURE — A9270 NON-COVERED ITEM OR SERVICE: HCPCS | Performed by: HOSPITALIST

## 2018-07-25 PROCEDURE — 82962 GLUCOSE BLOOD TEST: CPT | Mod: 91

## 2018-07-25 PROCEDURE — 36415 COLL VENOUS BLD VENIPUNCTURE: CPT

## 2018-07-25 PROCEDURE — 85025 COMPLETE CBC W/AUTO DIFF WBC: CPT

## 2018-07-25 PROCEDURE — 700102 HCHG RX REV CODE 250 W/ 637 OVERRIDE(OP): Performed by: STUDENT IN AN ORGANIZED HEALTH CARE EDUCATION/TRAINING PROGRAM

## 2018-07-25 RX ADMIN — METOPROLOL TARTRATE 25 MG: 25 TABLET, FILM COATED ORAL at 17:15

## 2018-07-25 RX ADMIN — ASPIRIN 81 MG: 81 TABLET, CHEWABLE ORAL at 05:46

## 2018-07-25 RX ADMIN — FAMOTIDINE 20 MG: 20 TABLET ORAL at 17:14

## 2018-07-25 RX ADMIN — INSULIN HUMAN 2 UNITS: 100 INJECTION, SOLUTION PARENTERAL at 10:58

## 2018-07-25 RX ADMIN — METOPROLOL TARTRATE 25 MG: 25 TABLET, FILM COATED ORAL at 05:46

## 2018-07-25 RX ADMIN — FAMOTIDINE 20 MG: 20 TABLET ORAL at 05:46

## 2018-07-25 RX ADMIN — PRAVASTATIN SODIUM 20 MG: 20 TABLET ORAL at 17:15

## 2018-07-25 RX ADMIN — RIVAROXABAN 20 MG: 20 TABLET, FILM COATED ORAL at 17:15

## 2018-07-25 RX ADMIN — CYANOCOBALAMIN TAB 500 MCG 1000 MCG: 500 TAB at 05:46

## 2018-07-25 RX ADMIN — LISINOPRIL 5 MG: 5 TABLET ORAL at 05:46

## 2018-07-25 ASSESSMENT — PAIN SCALES - GENERAL
PAINLEVEL_OUTOF10: 0

## 2018-07-25 NOTE — DISCHARGE PLANNING
Anticipated Discharge Disposition: [facility]      Action: Discussed with attending MD, pt is medically cleared to transfer to SNF LOC today. Pt has prior PASRR on file, completed LOC ID#[id number]. Completed transport form and faxed to [facility]. [facility] arranged transport for 5pm via [employer]. Met with pt and he signed COBRA and is in agreement with plan. COBRA completed, d/c summary completed and chart copy completed.     Barriers to Discharge: N/A.     Plan: As above, pt to transfer to [facility] today at 5pm via [facility].

## 2018-07-25 NOTE — CARE PLAN
Problem: Safety  Goal: Will remain free from falls  Outcome: PROGRESSING AS EXPECTED  Bed locked and in lowest position, call light and personal belongings in reach. Non skid socks on. Purposeful rounding. PT/OT. Up with 1-2 person assistance when ambulating. PT/OT recommending SNF, awaiting placement. Bed alarm on. Patient demonstrates proper use of call light.    Problem: Skin Integrity  Goal: Risk for impaired skin integrity will decrease  Outcome: PROGRESSING AS EXPECTED  Patient able to reposition self in bed, encouraged to do so q2h and as needed to alleviate pressure points. Keep skin clean and dry. Mepilex refused today and pt does not want another one applied.

## 2018-07-25 NOTE — DISCHARGE PLANNING
Received Transport Form @ 1106  Spoke to Tan @ [facility]    Transport is scheduled for 1700 @T832/2  going to [address] [facility].

## 2018-07-25 NOTE — PROGRESS NOTES
Assumed care at 0700, bedside report received from night shift RNSara. Pt is AOx4 with no signs of distress. Pt is A fib 84 on the monitor. Initial assessment completed, orders reviewed, call light within reach, and hourly rounding in place. POC addressed with patient, no additional questions at this time.

## 2018-07-25 NOTE — DISCHARGE PLANNING
Anticipated Discharge Disposition: SNF vs C    Action: Discussed pt's case in MDT rounds, discussed with attending MD pt's wishes to go to SNF short term prior to going home as he is concerned about being weak and falling. MD in agreement with SNF referral. Pt will likely be ready for d/c tomorrow 7/25/18. Met with pt at bedside and he is agreeable to sending SNF referral to all local SNF's with [facility] being his first choice. Obtained verbal from and faxed choice form to [facility].     Barriers to Discharge: N/A.     Plan: As above, will await acceptance to SNF. Pt has been accepted to [facility].

## 2018-07-25 NOTE — PROGRESS NOTES
Report received from off going RN. Plan of care reviewed with patient and all questions answered. Bed locked and in lowest position, call light and personal belongings in reach. Non skid socks on. Purposeful rounding. Patient has no complaints at this time and is in good spirits.

## 2018-07-25 NOTE — DISCHARGE PLANNING
Agency/Facility Name: [facility]  Spoke To: Jimmy  Outcome: Patient has accepted and they are running auth.

## 2018-07-25 NOTE — DISCHARGE PLANNING
Agency/Facility Name: [facility]  Spoke To: Tan  Outcome: They have received auth and will call for transport times available.

## 2018-07-25 NOTE — DISCHARGE INSTRUCTIONS
Discharge Instructions    Discharged to [facility] by medical transportation. Discharged via wheelchair, hospital escort: Yes.  Special equipment needed: Not Applicable    Be sure to schedule a follow-up appointment with your primary care doctor or any specialists as instructed.     Discharge Plan:   Influenza Vaccine Indication: Not indicated: Previously immunized this influenza season and > 8 years of age    I understand that a diet low in cholesterol, fat, and sodium is recommended for good health. Unless I have been given specific instructions below for another diet, I accept this instruction as my diet prescription.   Other diet: Low Sodium, Heart Healthy     Special Instructions: None    · Is patient discharged on Warfarin / Coumadin?   No       Atrial Fibrillation  Introduction  Atrial fibrillation is a type of heartbeat that is irregular or fast (rapid). If you have this condition, your heart keeps quivering in a weird (chaotic) way. This condition can make it so your heart cannot pump blood normally. Having this condition gives a person more risk for stroke, heart failure, and other heart problems. There are different types of atrial fibrillation. Talk with your doctor to learn about the type that you have.  Follow these instructions at home:  · Take over-the-counter and prescription medicines only as told by your doctor.  · If your doctor prescribed a blood-thinning medicine, take it exactly as told. Taking too much of it can cause bleeding. If you do not take enough of it, you will not have the protection that you need against stroke and other problems.  · Do not use any tobacco products. These include cigarettes, chewing tobacco, and e-cigarettes. If you need help quitting, ask your doctor.  · If you have apnea (obstructive sleep apnea), manage it as told by your doctor.  · Do not drink alcohol.  · Do not drink beverages that have caffeine. These include coffee, soda, and tea.  · Maintain a healthy  weight. Do not use diet pills unless your doctor says they are safe for you. Diet pills may make heart problems worse.  · Follow diet instructions as told by your doctor.  · Exercise regularly as told by your doctor.  · Keep all follow-up visits as told by your doctor. This is important.  Contact a doctor if:  · You notice a change in the speed, rhythm, or strength of your heartbeat.  · You are taking a blood-thinning medicine and you notice more bruising.  · You get tired more easily when you move or exercise.  Get help right away if:  · You have pain in your chest or your belly (abdomen).  · You have sweating or weakness.  · You feel sick to your stomach (nauseous).  · You notice blood in your throw up (vomit), poop (stool), or pee (urine).  · You are short of breath.  · You suddenly have swollen feet and ankles.  · You feel dizzy.  · Your suddenly get weak or numb in your face, arms, or legs, especially if it happens on one side of your body.  · You have trouble talking, trouble understanding, or both.  · Your face or your eyelid droops on one side.  These symptoms may be an emergency. Do not wait to see if the symptoms will go away. Get medical help right away. Call your local emergency services (911 in the [place] Do not drive yourself to the hospital.   This information is not intended to replace advice given to you by your health care provider. Make sure you discuss any questions you have with your health care provider.  Document Released: 09/26/2009 Document Revised: 05/25/2017 Document Reviewed: 04/13/2016  © 2017 Elsevier      Depression / Suicide Risk    As you are discharged from this [facility] facility, it is important to learn how to keep safe from harming yourself.    Recognize the warning signs:  · Abrupt changes in personality, positive or negative- including increase in energy   · Giving away possessions  · Change in eating patterns- significant weight changes-  positive or negative  · Change in  sleeping patterns- unable to sleep or sleeping all the time   · Unwillingness or inability to communicate  · Depression  · Unusual sadness, discouragement and loneliness  · Talk of wanting to die  · Neglect of personal appearance   · Rebelliousness- reckless behavior  · Withdrawal from people/activities they love  · Confusion- inability to concentrate     If you or a loved one observes any of these behaviors or has concerns about self-harm, here's what you can do:  · Talk about it- your feelings and reasons for harming yourself  · Remove any means that you might use to hurt yourself (examples: pills, rope, extension cords, firearm)  · Get professional help from the community (Mental Health, Substance Abuse, psychological counseling)  · Do not be alone:Call your Safe Contact- someone whom you trust who will be there for you.  · Call your local CRISIS HOTLINE 059-7087 or 485-215-1854  · Call your local Children's Mobile Crisis Response Team [place] (808) 163-8415 or www.Walkabout  · Call the toll free National Suicide Prevention Hotlines   · National Suicide Prevention Lifeline 885-206-YLJM (7453)  · National Hope Line Network 800-SUICIDE (761-9257)      Vitamin B12 oral  What is this medicine?  CYANOCOBALAMIN (sye an oh koe BAL a min) is a man made form of vitamin B12. Vitamin B12 is essential in the development of healthy blood cells, nerve cells, and proteins in the body. It also helps with the metabolism of fats and carbohydrates. It is added to a healthy diet to prevent or treat low vitamin B-12 levels.  This medicine may be used for other purposes; ask your health care provider or pharmacist if you have questions.  What should I tell my health care provider before I take this medicine?  They need to know if you have any of these conditions:  -anemia  -kidney disease  -Joan's disease  -malabsorption disorder  -an unusual or allergic reaction to cyanocobalamin, cobalt, other medicines, foods, dyes,  or preservatives  -pregnant or trying to get pregnant  -breast-feeding  How should I use this medicine?  Take this medicine by mouth with a glass of water. Follow the directions on the package or prescription label. If you are taking the tablets, do not chew, cut, or crush this medicine. If using an vitamin solution, use a specially marked spoon or dropper to measure each dose. Ask your pharmacist if you do not have one. Household spoons are not accurate. For best results take this vitamin with food. Take your medicine at regular intervals. Do not take your medicine more often than directed.  Talk to your pediatrician regarding the use of this medicine in children. While this drug may be prescribed for selected conditions, precautions do apply.  Overdosage: If you think you have taken too much of this medicine contact a poison control center or emergency room at once.  NOTE: This medicine is only for you. Do not share this medicine with others.  What if I miss a dose?  If you miss a dose, take it as soon as you can. If it is almost time for your next dose, take only that dose. Do not take double or extra doses.  What may interact with this medicine?  -alcohol  -aminosalicylic acid  -colchicine  -medicines that suppress your bone marrow like chemotherapy, chloramphenicol  This list may not describe all possible interactions. Give your health care provider a list of all the medicines, herbs, non-prescription drugs, or dietary supplements you use. Also tell them if you smoke, drink alcohol, or use illegal drugs. Some items may interact with your medicine.  What should I watch for while using this medicine?  Follow a healthy diet. Taking a vitamin supplement does not replace the need for a balanced diet. Some foods that have vitamin B-12 naturally are fish, seafood, egg yolk, milk and fermented cheese.  Too much of this vitamin can be unsafe. Talk to your doctor or health care provider about how much is right for  you.  What side effects may I notice from receiving this medicine?  Side effects that you should report to your doctor or health care professional as soon as possible:  -allergic reactions like skin rash, itching or hives, swelling of the face, lips, or tongue  -breathing problems  -chest pain, tightness  Side effects that usually do not require medical attention (report to your doctor or health care professional if they continue or are bothersome):  -diarrhea  This list may not describe all possible side effects. Call your doctor for medical advice about side effects. You may report side effects to FDA at 6-141-FDA-4622.  Where should I keep my medicine?  Keep out of the reach of children.  Store at room temperature between 15 and 30 degrees C (59 and 85 degrees F). Protect from heat and light. Throw away any unused medicine after the expiration date.  NOTE: This sheet is a summary. It may not cover all possible information. If you have questions about this medicine, talk to your doctor, pharmacist, or health care provider.  © 2018 Elsevier/Gold Standard (2013-05-21 07:58:17)

## 2018-07-25 NOTE — DISCHARGE PLANNING
Anticipated Discharge Disposition: SNF    Action: [facility] updated that [facility] and [facility] have accepted pt. Met with pt and discussed accepting SNF's, per pt he would like to go to [facility]. Updated [facility] and [facility] can accept pt but they will need to obtain auth.     Called and spoke to [facility] admissions and they submitted for auth. Called [place] Medicaid representative to assist with speeding up process on authorization for SNF.     Received update from [facility] that [facility] has obtained auth and can accept pt today.     Barriers to Discharge: N/A.     Plan: As above, pt has been accepted to [facility] and they have obtained auth. Will update MD for orders and d/c summary.

## 2018-07-25 NOTE — DISCHARGE PLANNING
Agency/Facility Name: [facility]  Spoke To: Saskia  Outcome: Can take patient but need to get auth, LSW Stacy is going to talk to patient for choice.

## 2018-07-25 NOTE — CARE PLAN
Problem: Safety  Goal: Will remain free from injury  Outcome: PROGRESSING AS EXPECTED  Pt educated to use call light and not get up on his own. Pt verbalizes understanding. Pt requires assistance x2 and is only able to stand at the bedside as he claims paralysis of BLE. Pt educated he is unstable to ambulate since he cannot walk or use legs to ambulate which appears to behavioral, however pt is not making effort to try to ambulate past bed and is unsteady. Pt has treaded socks in use. Bed alarm in lowest position and locked. Pt remains free from injury at this time.     Problem: Knowledge Deficit  Goal: Knowledge of the prescribed therapeutic regimen will improve  Outcome: PROGRESSING AS EXPECTED  Pt educated on POC for the shift and all medications given. Pt verbalizes understanding. Discharge plan discussed and anticipated discharge date is today. Pt encouraged to ask q's and does so appropriately. All q's answered at this time.

## 2018-07-25 NOTE — PROGRESS NOTES
[facility] Hospitalist Progress Note    Date of Service: 2018    Chief Complaint  53 y.o. male admitted 2018 with admittted with atrial fibrillation , syncope and chest pain    Interval Problem Update  : Patient woke up this morning with lower extremity paralysis   : NSG signed off  : Referred to SNF    Consultants/Specialty  Cardiology /neurologist     Disposition  Referred to SNF        Review of Systems   Constitutional: Negative for chills and fever.   HENT: Negative for hearing loss.    Eyes: Negative for blurred vision.   Respiratory: Negative for cough.    Cardiovascular: Negative for chest pain and palpitations.   Gastrointestinal: Negative for heartburn.   Genitourinary: Negative for dysuria.   Musculoskeletal: Negative for myalgias.   Skin: Negative for rash.   Neurological: Positive for focal weakness. Negative for sensory change.   Psychiatric/Behavioral: Negative for depression.      Physical Exam  Laboratory/Imaging   Hemodynamics  Temp (24hrs), Av.6 °C (97.9 °F), Min:36.3 °C (97.4 °F), Max:37 °C (98.6 °F)   Temperature: 37 °C (98.6 °F)  Pulse  Av.2  Min: 60  Max: 134    Blood Pressure: 124/74      Respiratory      Respiration: 16, Pulse Oximetry: 97 %             Fluids    Intake/Output Summary (Last 24 hours) at 18 1722  Last data filed at 18 1500   Gross per 24 hour   Intake              480 ml   Output              500 ml   Net              -20 ml       Nutrition  Orders Placed This Encounter   Procedures   • Diet Order Cardiac, Diabetic     Standing Status:   Standing     Number of Occurrences:   1     Order Specific Question:   Diet:     Answer:   Cardiac [6]     Order Specific Question:   Diet:     Answer:   Diabetic [3]     Physical Exam   Constitutional: He is oriented to person, place, and time. He appears well-developed and well-nourished.   HENT:   Head: Atraumatic.   Neck: Normal range of motion. Neck supple.   Cardiovascular: An irregular rhythm  present.   Pulmonary/Chest: Effort normal and breath sounds normal.   Abdominal: Soft. Bowel sounds are normal.   Musculoskeletal: He exhibits edema.   Unable to move lower extremity b/l    Neurological: He is alert and oriented to person, place, and time.       Recent Labs      07/22/18   0439  07/23/18   0233  07/24/18   0511   WBC  10.6  11.2*  10.0   RBC  5.59  6.05  6.11*   HEMOGLOBIN  15.6  16.7  17.2   HEMATOCRIT  46.9  50.0  51.0   MCV  83.9  82.6  83.5   MCH  27.9  27.6  28.2   MCHC  33.3*  33.4*  33.7   RDW  40.8  40.1  40.8   PLATELETCT  202  207  211   MPV  11.6  11.1  11.4     Recent Labs      07/22/18   0439  07/23/18   0233  07/24/18   0511   SODIUM  134*  134*  133*   POTASSIUM  4.6  4.3  4.2   CHLORIDE  103  102  101   CO2  28  23  24   GLUCOSE  125*  123*  119*   BUN  24*  18  21   CREATININE  0.91  0.79  0.84   CALCIUM  9.7  9.6  9.8                      Assessment/Plan     * Atrial fibrillation with rapid ventricular response (HCC)- (present on admission)   Assessment & Plan    Recently had decrease in his home metoprolol from 100 mg twice daily to 25 mg twice daily.  Denies any recent dehydration or volume loss.  s/p  Diltiazem drip.  Cardiology on board  Monitor on telemetry  Correct all electrolyte abnormalities.  Had recent echocardiogram with EF of 40%  s/p cardiac cath 7/21/2018 negative for cad   -Will resume Xarelto/ASA  CHADS score of at lest 2  Patient is now rate controlled   -         Vitamin B12 deficiency- (present on admission)   Assessment & Plan    - low normal level  - w/o anemia  - repleting orally         Paralysis of both lower limbs (HCC)   Assessment & Plan    Etiology unknown , Neurologist consulted , CT lumbar spine negative   MRI only shows moderate stenosis  NSG signed off  Possible malingering  Improved    Copper PENDING        Leukocytosis   Assessment & Plan    Probably reactive         Chest pain   Assessment & Plan    Probably due to afib , s/p  cardiac cath  7/21/2018 negative for CAD          Syncope   Assessment & Plan    Likely due to atrial fibrillation with rapid ventricular rate.        Dizziness- (present on admission)   Assessment & Plan    Due to rapid ventricular rate and atrial fibrillation.        HLD (hyperlipidemia)- (present on admission)   Assessment & Plan    Continue with pravastatin for ongoing active treatment        Hypertension- (present on admission)   Assessment & Plan    Monitor vitals.  Continue with outpatient lisinopril I have increased his metoprolol to 50 mg.  Holding outpatient HCTZ        Type 2 diabetes mellitus (HCC)- (present on admission)   Assessment & Plan    Monitor Accu-Cheks and cover with sliding scale insulin.    holding   metformin.  Diabetic diet        Obesity- (present on admission)   Assessment & Plan    History of morbid obesity.  Patient states there is been over 200 pounds with diet and exercise over the last few years.  Needs ongoing continued lifestyle management to get to a ideal body weight.        Euvolemic Hyponatremia- (present on admission)   Assessment & Plan    Better,s/p Gentle IV fluids monitor daily BMP  Likely adverse effect of HCTZ          Quality-Core Measures   Reviewed items::  Medications reviewed, Labs reviewed and Radiology images reviewed  DVT: Xarelto.  DVT prophylaxis - mechanical:  SCDs

## 2018-07-25 NOTE — DISCHARGE PLANNING
Agency/Facility Name: [facility]  Correspondence  Outcome: Patient declined for non-contracted Insurance provider.

## 2018-07-25 NOTE — DISCHARGE PLANNING
ATTN: Case Management  RE: Referral for Home Health    Reason for referral denial: Patient going to [facility]                We would like to take this opportunity to thank you for submitting a referral for your patient to continue the journey to recovery with [facility]. We hope to facilitate continued referrals from your facility as our goal is to provide quality, skilled care to as many patients as possible.            Unfortunately, we are not able to accept your patient into our service for the reason listed above. If further clarity is needed, our Intake Coordinators are available to discuss any barriers to service.            If you have any questions or concerns regarding this or future patients’ transition to Home Health, please do not hesitate to contact us. We are open for referrals 7 days a week from 8AM to 5PM at [phone].      We look forward to collaborating with you in the future,  [facility] Team

## 2018-07-25 NOTE — PROGRESS NOTES
S- happens sometimes, weakness and sensory loss.  No other complaints.     O-   Allergies:   Allergies   Allergen Reactions   • Daptomycin Rash     Body rash  RXN=1/2018     • Pcn [Penicillins] Hives and Rash      Rash & hives  RXN=since a kid   • Unasyn [Ampicillin-Sulbactam Sodium] Hives, Itching and Swelling   • Vancomycin Rash     Red man syndrome         Current Facility-Administered Medications:   •  cyanocobalamin (VITAMIN B-12) tablet 1,000 mcg, 1,000 mcg, Oral, DAILY, Fransico Hernandez M.D., 1,000 mcg at 07/24/18 0911  •  lisinopril (PRINIVIL) tablet 5 mg, 5 mg, Oral, DAILY, Azeb Colon M.D., 5 mg at 07/24/18 0541  •  rivaroxaban (XARELTO) tablet 20 mg, 20 mg, Oral, PM MEAL, Azeb Colon M.D., 20 mg at 07/24/18 1700  •  aspirin (ASA) chewable tab 81 mg, 81 mg, Oral, DAILY, Kisha Villegas M.D., 81 mg at 07/24/18 0540  •  metoprolol (LOPRESSOR) tablet 25 mg, 25 mg, Oral, TWICE DAILY, Kisha Villegas M.D., 25 mg at 07/24/18 1700  •  ondansetron (ZOFRAN) syringe/vial injection 4 mg, 4 mg, Intravenous, Q4HRS PRN, Rocio Ross M.D.  •  dexamethasone (DECADRON) injection 4 mg, 4 mg, Intravenous, Once PRN, Rocio Ross M.D.  •  diphenhydrAMINE (BENADRYL) injection 25 mg, 25 mg, Intravenous, Q6HRS PRN, Rocio Ross M.D.  •  haloperidol lactate (HALDOL) injection 1 mg, 1 mg, Intravenous, Q6HRS PRN, Rocio Ross M.D.  •  scopolamine (TRANSDERM-SCOP) patch 1 Patch, 1 Patch, Transdermal, Q72HRS PRN, Rocio Ross M.D.  •  acetaminophen (TYLENOL) tablet 650 mg, 650 mg, Oral, Q6HRS PRN, Rocio Ross M.D., 650 mg at 07/21/18 1352  •  famotidine (PEPCID) tablet 20 mg, 20 mg, Oral, BID, DANGELO Urrutia.O., 20 mg at 07/24/18 1700  •  pravastatin (PRAVACHOL) tablet 20 mg, 20 mg, Oral, Q EVENING, JONNY UrrutiaO., 20 mg at 07/24/18 1700  •  senna-docusate (PERICOLACE or SENOKOT S) 8.6-50 MG per tablet 2 Tab, 2 Tab, Oral, BID, 2 Tab at 07/23/18 5416 **AND**  polyethylene glycol/lytes (MIRALAX) PACKET 1 Packet, 1 Packet, Oral, QDAY PRN **AND** magnesium hydroxide (MILK OF MAGNESIA) suspension 30 mL, 30 mL, Oral, QDAY PRN **AND** bisacodyl (DULCOLAX) suppository 10 mg, 10 mg, Rectal, QDAY PRN, DANGELO Urrutia.O.  •  ondansetron (ZOFRAN ODT) dispertab 4 mg, 4 mg, Oral, Q4HRS PRN, DANGELO Urrutia.O.  •  promethazine (PHENERGAN) tablet 12.5-25 mg, 12.5-25 mg, Oral, Q4HRS PRN, JONNY UrrutiaO.  •  promethazine (PHENERGAN) suppository 12.5-25 mg, 12.5-25 mg, Rectal, Q4HRS PRN, DANGELO Urrutia.O.  •  prochlorperazine (COMPAZINE) injection 5-10 mg, 5-10 mg, Intravenous, Q4HRS PRN, DANGELO Urrutia.O.  •  insulin regular (HUMULIN R) injection 2-9 Units, 2-9 Units, Subcutaneous, 4X/DAY ACHS, Stopped at 07/20/18 2100 **AND** Accu-Chek ACHS, , , Q AC AND BEDTIME(S) **AND** NOTIFY MD and PharmD, , , Once **AND** glucose 4 g chewable tablet 16 g, 16 g, Oral, Q15 MIN PRN **AND** dextrose 50% (D50W) injection 25 mL, 25 mL, Intravenous, Q15 MIN PRN, Wicho Colon D.O.      PHYSICAL EXAM    Vitals:    07/24/18 0500 07/24/18 0837 07/24/18 1217 07/24/18 1557   BP: 120/76 106/73 123/80 124/74   Pulse: 70 88 88 (!) 111   Resp: 16 16 15 16   Temp: 36.6 °C (97.8 °F) 36.4 °C (97.5 °F) 36.9 °C (98.5 °F) 37 °C (98.6 °F)   SpO2: 97% 95% 97% 97%   Weight:       Height:           Head/Neck: NCAT. no meningismus neg kernig neg brudzinski. No obvious mass or heard bruit. No tender arteries or lost pulses. No rash of head or neck.    Skin: Warm, dry, intact. No rashes observed head/neck or body    Eyes/Funduscopic: unable    Mental Status: Awake, alert, oriented x 3. Names/repeats/fluent/follows commands. No neglect/extinction. Attention and concentration, recent & remote memory, Fund of Knowledge wnl.  Very happy and excited to joke.    Cranial Nerves:  CN II-XII intact. PERRL 3mm.   No afferent pupillary defect. EOM full. VF full. No nystagmus.       Motor: bulk & tone wnl.  Functional  weakness of lower extremities with inconsistent exam other intact and sym, no abn mvmts     Sensory: sensory level reported near legs meet trunk uncertain equivocates    Coordination: dysmetria absent     DTR's: intact/sym but difficult fully with obesity and cooperation. no clonus. Toes mute.    Gait/Station: n/a fall concern      Labs:  Recent Labs      07/22/18   0439  07/23/18   0233  07/24/18   0511   WBC  10.6  11.2*  10.0   RBC  5.59  6.05  6.11*   HEMOGLOBIN  15.6  16.7  17.2   HEMATOCRIT  46.9  50.0  51.0   MCV  83.9  82.6  83.5   MCH  27.9  27.6  28.2   MCHC  33.3*  33.4*  33.7   RDW  40.8  40.1  40.8   PLATELETCT  202  207  211   MPV  11.6  11.1  11.4     Recent Labs      07/22/18   0439  07/23/18   0233  07/24/18   0511   SODIUM  134*  134*  133*   POTASSIUM  4.6  4.3  4.2   CHLORIDE  103  102  101   CO2  28  23  24   GLUCOSE  125*  123*  119*   BUN  24*  18  21   CREATININE  0.91  0.79  0.84   CALCIUM  9.7  9.6  9.8                     Recent Labs      07/22/18   0439  07/23/18   0233  07/24/18   0511   SODIUM  134*  134*  133*   POTASSIUM  4.6  4.3  4.2   CHLORIDE  103  102  101   CO2  28  23  24   GLUCOSE  125*  123*  119*   BUN  24*  18  21     Recent Labs      07/22/18   0439  07/23/18   0233  07/24/18   0511   SODIUM  134*  134*  133*   POTASSIUM  4.6  4.3  4.2   CHLORIDE  103  102  101   CO2  28  23  24   BUN  24*  18  21   CREATININE  0.91  0.79  0.84   CALCIUM  9.7  9.6  9.8         No results found for this or any previous visit.           Imaging: neuroimaging reviewed and directly visualized by me  MR-CERVICAL SPINE-WITH   Final Result      MRI with contrast demonstrating no abnormal enhancement in the cervical spine.      MR-THORACIC SPINE-WITH   Final Result      MRI with contrast demonstrating no abnormal enhancement in the thoracic spine.         MR-LUMBAR SPINE-WITH   Final Result   Addendum 1 of 1   Correction: 20 mL of Omniscan rather than Gadavist was administered    intravenously for  this exam      Final      MRI with contrast demonstrating no abnormal enhancement in the lumbar spine.      MR-CERVICAL SPINE-W/O   Final Result         1.  Multilevel multifactorial degenerative changes   2.  Congenitally narrow cervical central canal due to short pedicles   3.  Central canal narrowing worst C4-C5   4.  Other areas of central canal and neural foraminal narrowing as described above      MR-THORACIC SPINE-W/O   Final Result      Mild thoracic spondylosis      MR-LUMBAR SPINE-W/O   Final Result      1.  Multilevel multifactorial degenerative changes   2.  No areas of high-grade central canal narrowing   3.  Areas of central canal and neural foraminal narrowing as described above      CT-LSPINE W/O PLUS RECONS   Final Result         1. No acute fracture or malalignment appreciated in the lumbar spine         DX-CHEST-2 VIEWS   Final Result         1. No active cardiopulmonary abnormalities are identified.      CT-HEAD W/O   Final Result         1. No acute intracranial abnormality. No evidence of acute intracranial hemorrhage or mass lesion.               DX-CHEST-LIMITED (1 VIEW)   Final Result         No acute cardiopulmonary abnormalities are identified.          Assessment/Plan:    Functional paraplegia, similar past events full recuperation without explanation  Chronic C4-5-6 seg spinal stenosis, not surgical candidate  RN seen and documented transient ability to utilize lower extremities lifting both fully into bed at times, my exam supports this, imaging and workup neg thusfar.      PMR/PT   Psych if unable to assure diagnosis  EMG/NCV if still nonplussed    No further neuro workup as inpatient if aforementioned studies WNL & maintains neuro baseline.  Neuro sign off, reconsult PRN.  F/u otpt Neuro ASAP.       Simone Childress M.D.  , Neurohospitalist & Stroke  Clinical Professor, [employer]  Diplomate, Neurology & Neuroimaging

## 2018-07-26 LAB — COPPER SERPL-MCNC: 123 UG/DL (ref 70–140)

## 2018-07-26 NOTE — DISCHARGE SUMMARY
Discharge Summary    CHIEF COMPLAINT ON ADMISSION  Chief Complaint   Patient presents with   • Chest Pain     right side chest pain, atrial fibrillation with history, on xarelto       Reason for Admission  EMS T-1 CP     Admission Date  7/9/2018    CODE STATUS  Full    HPI & HOSPITAL COURSE  This is a 53 y.o. male with past medical history of paroxysmal A. fib on Xarelto, type 2 diabetes mellitus, hypertension, hyperlipidemia who came in with chest pain and palpitation.  In the ER his first troponin was negative.  His EKG showed atrial fibrillation with RVR.  He was given Lopressor and his heart rate was better controlled.  His rate control medications were adjusted during his hospital stay.  He was resumed on Xarelto.  Patient also had a fall.  Imaging studies were negative for any acute bony abnormalities or fractures.  Physical therapy evaluated the patient and recommended skilled nursing facility placement.  Regarding his chest pain, he had a negative stress test in January 2018 that was negative.  Felt that his pain is more positional and pleuritic.  His troponins were negative.  His echo cardiogram showed reduced ejection fraction from 60-40%.  His orthostatic blood pressure was positive and his blood pressure medications including Lopressor were adjusted.  Patient heart rate was controlled during his hospital stay.  He was euvolemic and outpatient cardiology follow-up recommended and that was also per cardiology evaluation in January.  His blood pressure improved over his hospital stay with adjustment of his medications however he was still weak and skilled nursing facility placement was pending.  On 7/16/2018 his heart rate was in the 160s but was noted by the RN that he was moving and was advised to call the RN if needs to get out of bed.  At 2210 patient was found absent from his room.  Then he was noted to be walking outside ER.  He was still have his monitor and IV.  He allow the RN to remove the monitor  leads and to take out his IV.  After that he continue walking and went to bus stop.  He did not sign his AMA paperwork.  He was advised to return to the hospital as he has risk of falls and needs to be placed in a rehab.  Patient refused and left the hospital without signing the AMA paperwork.     Patient left AMA.      The patient met 2-midnight criteria for an inpatient stay at the time of discharge.    Discharge Date  7/16/2018    FOLLOW UP ITEMS POST DISCHARGE  Patient left AMA    DISCHARGE DIAGNOSES  Principal Problem:    Fall POA: Unknown  Active Problems:    Paroxysmal A-fib (HCC) POA: Yes    Atypical chest pain POA: Yes    Euvolemic Hyponatremia POA: Yes    Type 2 diabetes mellitus (HCC) (Chronic) POA: Yes    GERD (gastroesophageal reflux disease) (Chronic) POA: Yes    HLD (hyperlipidemia) (Chronic) POA: Yes    HTN (hypertension) (Chronic) POA: Yes    Acquired circulating anticoagulants (HCC) POA: Yes    Acute conjunctivitis POA: Unknown    Dizziness POA: Unknown  Resolved Problems:    * No resolved hospital problems. *      FOLLOW UP  Future Appointments  Date Time Provider Department Center   7/30/2018 8:30 AM Jerad Lomax M.D. [facility]   8/1/2018 3:20 PM [facility] None     Jerad Lomax M.D.  [address] [place] [id number]  [phone]            MEDICATIONS ON DISCHARGE     Medication List      ASK your doctor about these medications      Instructions   acetaminophen 325 MG Tabs  Commonly known as:  TYLENOL   Take 2 Tabs by mouth every 6 hours as needed (Mild Pain; (Pain scale 1-3); Temp greater than 100.5 F).  Dose:  650 mg     aspirin 81 MG EC tablet   Take 1 Tab by mouth every day.  Dose:  81 mg     famotidine 20 MG Tabs  Commonly known as:  PEPCID   Take 1 Tab by mouth 2 times a day.  Dose:  20 mg     metFORMIN 500 MG Tabs  Commonly known as:  GLUCOPHAGE   Take 2 Tabs by mouth 2 times a day, with meals.  Dose:  1000 mg     pravastatin 20 MG Tabs  Commonly known as:   PRAVACHOL   Take 1 Tab by mouth every evening.  Dose:  20 mg     rivaroxaban 20 MG Tabs tablet  Commonly known as:  XARELTO   Take 1 Tab by mouth with dinner.  Dose:  20 mg            Allergies  Allergies   Allergen Reactions   • Daptomycin Rash     Body rash  RXN=1/2018     • Pcn [Penicillins] Hives and Rash      Rash & hives  RXN=since a kid   • Unasyn [Ampicillin-Sulbactam Sodium] Hives, Itching and Swelling   • Vancomycin Rash     Red man syndrome       DIET  No orders of the defined types were placed in this encounter.      ACTIVITY  As tolerated    CONSULTATIONS  None     PROCEDURES  None    LABORATORY  Lab Results   Component Value Date    SODIUM 132 (L) 07/25/2018    POTASSIUM 4.4 07/25/2018    CHLORIDE 101 07/25/2018    CO2 21 07/25/2018    GLUCOSE 151 (H) 07/25/2018    BUN 23 (H) 07/25/2018    CREATININE 0.79 07/25/2018        Lab Results   Component Value Date    WBC 12.4 (H) 07/25/2018    HEMOGLOBIN 16.8 07/25/2018    HEMATOCRIT 49.1 07/25/2018    PLATELETCT 162 (L) 07/25/2018        Total time of the discharge process exceeds 31 minutes.

## 2018-07-26 NOTE — PROGRESS NOTES
Pt was dc'd to [facility] via wheelchair with [facility]. DC paperwork was discussed and signed by the pt. COBRA was sent with pt. Core measures sheet was completed with 2 RN signatures. LDA's were removed, tele monitor was removed, and pt was escorted out with Koby maldonado.

## 2018-07-28 NOTE — DOCUMENTATION QUERY
"DOCUMENTATION QUERY    PROVIDERS: Please select “Cosign w/ note”to reply to query.    To better represent the severity of illness of your patient, please review the following information and exercise your independent professional judgment in responding to this query.     Patient is admitted with atrial fibrillation. \"Mild systolic congestive heart failure\" is noted only in the Discharge Summary. Home medications include hydrochlorothiazide and lisinopril.    Based upon the clinical findings, risk factors, and treatment can this diagnosis be further specified?    • Acute Systolic heart failure   • Chronic Systolic heart failure  • Acute on Chronic Systolic heart failure  • Other explanation of clinical findings (please document)  • Unable to determine           The medical record reflects the following:   Clinical Findings  BNP on admit 114   Treatment Home meds adjusted for a. fib   Risk Factors  a. fib   Location within medical record  Discharge Summary     Thank you,     Susy Wagner  HIM Coder  [phone]        "

## 2018-08-01 ENCOUNTER — OFFICE VISIT (OUTPATIENT)
Dept: CARDIOLOGY | Facility: MEDICAL CENTER | Age: 53
End: 2018-08-01
Payer: MEDICAID

## 2018-08-01 VITALS
SYSTOLIC BLOOD PRESSURE: 118 MMHG | WEIGHT: 258 LBS | DIASTOLIC BLOOD PRESSURE: 72 MMHG | HEART RATE: 64 BPM | BODY MASS INDEX: 34.95 KG/M2 | OXYGEN SATURATION: 96 % | HEIGHT: 72 IN

## 2018-08-01 DIAGNOSIS — R07.89 ATYPICAL CHEST PAIN: ICD-10-CM

## 2018-08-01 DIAGNOSIS — I48.91 ATRIAL FIBRILLATION WITH RAPID VENTRICULAR RESPONSE (HCC): ICD-10-CM

## 2018-08-01 PROCEDURE — 99214 OFFICE O/P EST MOD 30 MIN: CPT | Performed by: NURSE PRACTITIONER

## 2018-08-01 PROCEDURE — 93000 ELECTROCARDIOGRAM COMPLETE: CPT | Performed by: NURSE PRACTITIONER

## 2018-08-01 RX ORDER — AMIODARONE HYDROCHLORIDE 200 MG/1
200 TABLET ORAL DAILY
Qty: 30 TAB | Refills: 0 | Status: SHIPPED | OUTPATIENT
Start: 2018-08-01 | End: 2018-09-09

## 2018-08-01 ASSESSMENT — ENCOUNTER SYMPTOMS
PND: 0
DIZZINESS: 0
FEVER: 0
SHORTNESS OF BREATH: 0
PALPITATIONS: 1
CLAUDICATION: 0
NAUSEA: 0
COUGH: 0
SPUTUM PRODUCTION: 0
VOMITING: 0
ORTHOPNEA: 1
HEADACHES: 0
HEMOPTYSIS: 0
CHILLS: 0
WHEEZING: 0

## 2018-08-01 NOTE — PROGRESS NOTES
Chief Complaint   Patient presents with   • Atrial Fibrillation     follow up       Subjective:   Sebastián Castro is a 53 y.o. male who presents today for atrial fibrillation, syncope, and atypical chest pain.  Patient was hospitalized July 19-25, 2018 for heavy pressure in his anterior chest.  Patient was found to be an atrial fibrillation with RVR.  Echocardiogram was obtained indicating EF 40-45% which has declined compared to January 2018.  Patient underwent cardiac catheterization showing global hypokinesis with significant run of VT during the left ventriculography with EF 45% and no significant coronary artery disease.    The patient lost feeling in his lower legs and so was discharged to [facility].  He reports continued palpitations and heavy pressure in his anterior chest.  I have explained to him that the angiogram shows no significant blockages, thus chest pain must be from a different etiology.    EKG was performed today indicating atrial fibrillation with a rate  and PVC.    Past Medical History:   Diagnosis Date   • A-fib ([facility])    • Chest pain    • Diabetes ([facility])    • Diabetic neuropathy ([facility])    • Hypercholesterolemia    • Hypertension    • Morbid obesity ([facility])    • Noncompliance    • Normal cardiac stress test      Past Surgical History:   Procedure Laterality Date   • IRRIGATION & DEBRIDEMENT ORTHO Right 2/19/2018    Procedure: IRRIGATION & DEBRIDEMENT ORTHO-FOOT;  Surgeon: Kirby Lopez M.D.;  Location: SURGERY [facility];  Service: Orthopedics   • TOE AMPUTATION Right 1/17/2018    Procedure: TOE AMPUTATION-1ST RAY;  Surgeon: Doug Delong M.D.;  Location: SURGERY [facility];  Service: Orthopedics   • TOE AMPUTATION Right 11/10/2017    Procedure: TOE AMPUTATION;  Surgeon: Osorio Leo M.D.;  Location: SURGERY [facility];  Service: Orthopedics     History reviewed. No pertinent family history.  Social History     Social History   • Marital status:      Spouse  name: N/A   • Number of children: N/A   • Years of education: N/A     Occupational History   • Not on file.     Social History Main Topics   • Smoking status: Never Smoker   • Smokeless tobacco: Never Used   • Alcohol use No   • Drug use: No   • Sexual activity: Not on file     Other Topics Concern   • Not on file     Social History Narrative   • No narrative on file     Allergies   Allergen Reactions   • Daptomycin Rash     Body rash  RXN=1/2018     • Pcn [Penicillins] Hives and Rash      Rash & hives  RXN=since a kid   • Unasyn [Ampicillin-Sulbactam Sodium] Hives, Itching and Swelling   • Vancomycin Rash     Red man syndrome     Outpatient Encounter Prescriptions as of 8/1/2018   Medication Sig Dispense Refill   • cyanocobalamin (VITAMIN B12) 1000 MCG Tab Take 1 Tab by mouth every day. 30 Tab 2   • lisinopril (PRINIVIL) 5 MG Tab Take 1 Tab by mouth every day. 30 Tab 0   • metoprolol (LOPRESSOR) 25 MG Tab Take 1 Tab by mouth 2 Times a Day. 60 Tab 0   • rivaroxaban (XARELTO) 20 MG Tab tablet Take 1 Tab by mouth with dinner. 30 Tab 0   • famotidine (PEPCID) 20 MG Tab Take 1 Tab by mouth 2 times a day. 60 Tab 3   • aspirin 81 MG EC tablet Take 1 Tab by mouth every day. 30 Tab 3   • metFORMIN (GLUCOPHAGE) 500 MG Tab Take 2 Tabs by mouth 2 times a day, with meals. 60 Tab 0   • pravastatin (PRAVACHOL) 20 MG Tab Take 1 Tab by mouth every evening. 30 Tab 11   • acetaminophen (TYLENOL) 325 MG Tab Take 2 Tabs by mouth every 6 hours as needed (Mild Pain; (Pain scale 1-3); Temp greater than 100.5 F). 30 Tab 0     No facility-administered encounter medications on file as of 8/1/2018.      Review of Systems   Constitutional: Negative for chills and fever.   Respiratory: Negative for cough, hemoptysis, sputum production, shortness of breath and wheezing.    Cardiovascular: Positive for chest pain, palpitations, orthopnea and leg swelling. Negative for claudication and PND.   Gastrointestinal: Negative for nausea and vomiting.    Neurological: Negative for dizziness and headaches.      Objective:   /72   Pulse 64   Ht 1.829 m (6')   Wt 117 kg (258 lb)   SpO2 96%   BMI 34.99 kg/m²     Physical Exam   Constitutional: He is oriented to person, place, and time. He appears well-developed and well-nourished.   HENT:   Head: Normocephalic and atraumatic.   Eyes: EOM are normal.   Neck: Neck supple.   Cardiovascular: Normal rate, regular rhythm, normal heart sounds and intact distal pulses.    No murmur heard.  Pulmonary/Chest: Effort normal and breath sounds normal.   Abdominal: Soft. Bowel sounds are normal.   Musculoskeletal: He exhibits edema.   Trace BLE   Neurological: He is alert and oriented to person, place, and time.   Skin: Skin is warm and dry.   Psychiatric: He has a normal mood and affect. His behavior is normal.   Nursing note and vitals reviewed.    ECHOCARDIOGRAM 7/9/18  CONCLUSIONS  Moderately reduced left ventricular systolic function.  Mild concentric left ventricular hypertrophy.  Aortic sclerosis without stenosis.  Estimated right ventricular systolic pressure  is 30 mmHg.  Compared to the images of the study done on 01/27/2018 there has been   reduction in left ventricular ejection fraction.    CARDIACCATHETERIZATION 7/21/18  1.  Hemodynamics:  LV systolic pressure was 99 mmHg with LV end-diastolic   pressure of 11 mmHg.  There was no gradient across the aortic valve.  2.  Left ventriculography showed global hypokinesis.  Patient has significant   run of ventricular tachycardia during left ventriculography.  Post-PVC   ejection fraction was probably about 45%.  3.  No significant coronary artery disease.  This is a left dominant system.    Assessment:     1. Atrial fibrillation with rapid ventricular response (HCC)       Medical Decision Making:  Today's Assessment / Status / Plan:   1. Atrial fibrillation  -Currently not rate controlled, start amiodarone 400 mg twice daily for 7 days, 400 mg daily for 7 days,  then 200 mg daily thereafter.  Once rate controlled consider changing amiodarone to CCB.  -Continue rivaroxaban 20 mg daily  -Obtain 24 hour Holter monitor to check heart rates in approximately 2 weeks  -Obtain echocardiogram once patient is rate controlled    2.  Chest pain  -Cardiac catheterization shows no obstruction    Collaborating MD is Dr. Stoll.

## 2018-08-02 LAB — EKG IMPRESSION: NORMAL

## 2018-08-16 ENCOUNTER — NON-PROVIDER VISIT (OUTPATIENT)
Dept: CARDIOLOGY | Facility: MEDICAL CENTER | Age: 53
End: 2018-08-16
Payer: MEDICAID

## 2018-08-16 DIAGNOSIS — I48.91 ATRIAL FIBRILLATION, UNSPECIFIED TYPE (HCC): ICD-10-CM

## 2018-08-17 DIAGNOSIS — I48.91 ATRIAL FIBRILLATION WITH RAPID VENTRICULAR RESPONSE (HCC): ICD-10-CM

## 2018-08-20 LAB — EKG IMPRESSION: NORMAL

## 2018-08-20 PROCEDURE — 93224 XTRNL ECG REC UP TO 48 HRS: CPT | Performed by: INTERNAL MEDICINE

## 2018-08-23 ENCOUNTER — OFFICE VISIT (OUTPATIENT)
Dept: CARDIOLOGY | Facility: MEDICAL CENTER | Age: 53
End: 2018-08-23
Payer: MEDICAID

## 2018-08-23 VITALS
BODY MASS INDEX: 35.89 KG/M2 | RESPIRATION RATE: 12 BRPM | OXYGEN SATURATION: 98 % | WEIGHT: 265 LBS | HEART RATE: 82 BPM | DIASTOLIC BLOOD PRESSURE: 64 MMHG | HEIGHT: 72 IN | SYSTOLIC BLOOD PRESSURE: 98 MMHG

## 2018-08-23 DIAGNOSIS — I10 ESSENTIAL HYPERTENSION: ICD-10-CM

## 2018-08-23 DIAGNOSIS — R07.89 OTHER CHEST PAIN: ICD-10-CM

## 2018-08-23 DIAGNOSIS — I48.0 PAROXYSMAL A-FIB (HCC): ICD-10-CM

## 2018-08-23 PROBLEM — E78.2 MIXED HYPERLIPIDEMIA: Status: ACTIVE | Noted: 2018-01-15

## 2018-08-23 PROCEDURE — 99214 OFFICE O/P EST MOD 30 MIN: CPT | Performed by: NURSE PRACTITIONER

## 2018-08-23 ASSESSMENT — ENCOUNTER SYMPTOMS
ABDOMINAL PAIN: 0
ORTHOPNEA: 0
PALPITATIONS: 1
PND: 0
WEAKNESS: 0
MYALGIAS: 0
DIZZINESS: 0
COUGH: 0
SHORTNESS OF BREATH: 1
CLAUDICATION: 0

## 2018-08-23 NOTE — PROGRESS NOTES
Chief Complaint   Patient presents with   • Atrial Fibrillation     Holter Monitor results       Subjective:   Sebastián Castro is a 53 y.o. male who presents today to follow-up on atrial fibrillation.  He had a syncopal episode and went to the hospital was found to be in rapid atrial fibrillation.  He was started on anticoagulation and amiodarone.  He had weakness in his legs therefore he went to [facility] rehab facility.    He was last seen by Juli MEJIA who ordered a Holter monitor  to determine his rate control.  He did have some rapid heart rates for the Holter monitor.  He was in atrial fibrillation continuously.    He feels generally well but occasionally will have some palpitations where he feels as though his heart races.  He does have some chest tightness when this occurs.  This is infrequent.    He is able to walk now and feels ready to go home.  He works as a  and wants to return to work in a couple weeks when his heart is stabilized.    Past Medical History:   Diagnosis Date   • A-fib ([facility])    • Chest pain    • Diabetes ([facility])    • Diabetic neuropathy ([facility])    • Hypercholesterolemia    • Hypertension    • Morbid obesity ([facility])    • Noncompliance    • Normal cardiac stress test      Past Surgical History:   Procedure Laterality Date   • CARDIAC CATH  07/21/2018    EF 45%, normal coronaries.   • IRRIGATION & DEBRIDEMENT ORTHO Right 2/19/2018    Procedure: IRRIGATION & DEBRIDEMENT ORTHO-FOOT;  Surgeon: Kirby Lopez M.D.;  Location: [facility];  Service: Orthopedics   • TOE AMPUTATION Right 1/17/2018    Procedure: TOE AMPUTATION-1ST RAY;  Surgeon: Doug Delong M.D.;  Location: SURGERY [facility];  Service: Orthopedics   • TOE AMPUTATION Right 11/10/2017    Procedure: TOE AMPUTATION;  Surgeon: Osorio Leo M.D.;  Location: [facility];  Service: Orthopedics     History reviewed. No pertinent family history.  Social History     Social History   • Marital  status:      Spouse name: N/A   • Number of children: N/A   • Years of education: N/A     Occupational History   • Not on file.     Social History Main Topics   • Smoking status: Never Smoker   • Smokeless tobacco: Never Used   • Alcohol use No   • Drug use: No   • Sexual activity: Not on file     Other Topics Concern   • Not on file     Social History Narrative   • No narrative on file     Allergies   Allergen Reactions   • Daptomycin Rash     Body rash  RXN=1/2018     • Pcn [Penicillins] Hives and Rash      Rash & hives  RXN=since a kid   • Unasyn [Ampicillin-Sulbactam Sodium] Hives, Itching and Swelling   • Vancomycin Rash     Red man syndrome     Outpatient Encounter Prescriptions as of 8/23/2018   Medication Sig Dispense Refill   • amiodarone (CORDARONE) 200 MG Tab Take 1 Tab by mouth every day. 30 Tab 0   • cyanocobalamin (VITAMIN B12) 1000 MCG Tab Take 1 Tab by mouth every day. 30 Tab 2   • lisinopril (PRINIVIL) 5 MG Tab Take 1 Tab by mouth every day. 30 Tab 0   • metoprolol (LOPRESSOR) 25 MG Tab Take 1 Tab by mouth 2 Times a Day. 60 Tab 0   • rivaroxaban (XARELTO) 20 MG Tab tablet Take 1 Tab by mouth with dinner. 30 Tab 0   • famotidine (PEPCID) 20 MG Tab Take 1 Tab by mouth 2 times a day. 60 Tab 3   • aspirin 81 MG EC tablet Take 1 Tab by mouth every day. 30 Tab 3   • metFORMIN (GLUCOPHAGE) 500 MG Tab Take 2 Tabs by mouth 2 times a day, with meals. 60 Tab 0   • pravastatin (PRAVACHOL) 20 MG Tab Take 1 Tab by mouth every evening. 30 Tab 11   • acetaminophen (TYLENOL) 325 MG Tab Take 2 Tabs by mouth every 6 hours as needed (Mild Pain; (Pain scale 1-3); Temp greater than 100.5 F). 30 Tab 0     No facility-administered encounter medications on file as of 8/23/2018.      Review of Systems   Constitutional: Negative for malaise/fatigue.   Respiratory: Positive for shortness of breath (when in AF ). Negative for cough.    Cardiovascular: Positive for chest pain (tightness when in fast A fib.) and  palpitations (racing occasionally.). Negative for orthopnea, claudication, leg swelling and PND.   Gastrointestinal: Negative for abdominal pain.   Musculoskeletal: Negative for myalgias.   Neurological: Negative for dizziness and weakness.        Objective:   BP (!) 98/64   Pulse 82   Resp 12   Ht 1.829 m (6')   Wt 120.2 kg (265 lb)   SpO2 98%   BMI 35.94 kg/m²     Physical Exam   Constitutional: He is oriented to person, place, and time. He appears well-developed and well-nourished.   HENT:   Head: Normocephalic.   Eyes: EOM are normal.   Neck: Normal range of motion. No JVD present.   Cardiovascular: Normal rate.  An irregularly irregular rhythm present. Exam reveals no friction rub.    No murmur heard.  Pulmonary/Chest: Effort normal.   Abdominal: Soft. Bowel sounds are normal.   Musculoskeletal: He exhibits no edema (Fatty ankles but no edema.).   Neurological: He is alert and oriented to person, place, and time.   Skin: Skin is warm and dry.   Psychiatric: He has a normal mood and affect.     August 20, 2018: Holter monitor:   Interpretive Statements   *   Monitoring started at 11:26 AM and continued for 24 hours. Overall rhythm   was Atrial Fibrillation. The average heart rate was 86 BPM. The minimum heart   rate was 50 BPM, occurring at 11:27:28 PM. The maximum heart rate was 141   BPM, occurring at 3:31:36 PM.     *  Rare wide complex beats.  May well represent atrial beats with aberrancy.     *   Diary Entries- Symptoms listed in diary correlated with a heart rate   between  BPM with one isolated PVC.        Assessment:     1. Paroxysmal A-fib (HCC)     2. Other chest pain     3. Essential hypertension         Medical Decision Making:  Today's Assessment / Status / Plan:     Atrial fibrillation: He is in atrial fibrillation but has good rate control in the office today.  We will have him continue amiodarone 200 mg and metoprolol 25 mg twice a day.    He will continue on Xarelto 20 mg for  anticoagulation.  He has no bleeding or excessive bruising.    Chest pain: He experiences chest tightness when his heart is racing.  He had normal coronaries per cardiac catheterization in July therefore this is most likely related to the atrial fibrillation and not coronary disease.    Hypertension: His blood pressure is low in the office today but he denies lightheadedness.  I would like him to take lisinopril 5 mg in the evening instead of the morning.  I do not want to reduce the lisinopril dose since he is a diabetic.    He stable enough to follow-up in 3 months with Dr Sanchez or myself.  He will follow-up sooner if problems.    Collaborating Provider: Dr. Fernandez.

## 2018-08-24 ENCOUNTER — APPOINTMENT (OUTPATIENT)
Dept: RADIOLOGY | Facility: MEDICAL CENTER | Age: 53
End: 2018-08-24
Attending: EMERGENCY MEDICINE
Payer: MEDICAID

## 2018-08-24 ENCOUNTER — HOSPITAL ENCOUNTER (EMERGENCY)
Facility: MEDICAL CENTER | Age: 53
End: 2018-08-24
Attending: EMERGENCY MEDICINE
Payer: MEDICAID

## 2018-08-24 VITALS
OXYGEN SATURATION: 98 % | RESPIRATION RATE: 20 BRPM | BODY MASS INDEX: 35.94 KG/M2 | HEART RATE: 77 BPM | DIASTOLIC BLOOD PRESSURE: 69 MMHG | TEMPERATURE: 97 F | SYSTOLIC BLOOD PRESSURE: 132 MMHG | WEIGHT: 265 LBS

## 2018-08-24 DIAGNOSIS — R29.898 WEAKNESS OF BOTH LOWER EXTREMITIES: ICD-10-CM

## 2018-08-24 DIAGNOSIS — R55 SYNCOPE, UNSPECIFIED SYNCOPE TYPE: ICD-10-CM

## 2018-08-24 LAB
ALBUMIN SERPL BCP-MCNC: 3.5 G/DL (ref 3.2–4.9)
ALBUMIN/GLOB SERPL: 1 G/DL
ALP SERPL-CCNC: 79 U/L (ref 30–99)
ALT SERPL-CCNC: 24 U/L (ref 2–50)
ANION GAP SERPL CALC-SCNC: 5 MMOL/L (ref 0–11.9)
APTT PPP: 36.7 SEC (ref 24.7–36)
AST SERPL-CCNC: 19 U/L (ref 12–45)
BILIRUB SERPL-MCNC: 0.7 MG/DL (ref 0.1–1.5)
BUN SERPL-MCNC: 20 MG/DL (ref 8–22)
CALCIUM SERPL-MCNC: 9.4 MG/DL (ref 8.5–10.5)
CHLORIDE SERPL-SCNC: 104 MMOL/L (ref 96–112)
CO2 SERPL-SCNC: 27 MMOL/L (ref 20–33)
CREAT SERPL-MCNC: 0.76 MG/DL (ref 0.5–1.4)
EKG IMPRESSION: NORMAL
ERYTHROCYTE [DISTWIDTH] IN BLOOD BY AUTOMATED COUNT: 42.3 FL (ref 35.9–50)
GLOBULIN SER CALC-MCNC: 3.4 G/DL (ref 1.9–3.5)
GLUCOSE SERPL-MCNC: 99 MG/DL (ref 65–99)
HCT VFR BLD AUTO: 43.1 % (ref 42–52)
HGB BLD-MCNC: 14.1 G/DL (ref 14–18)
INR PPP: 1.47 (ref 0.87–1.13)
MCH RBC QN AUTO: 27.5 PG (ref 27–33)
MCHC RBC AUTO-ENTMCNC: 32.7 G/DL (ref 33.7–35.3)
MCV RBC AUTO: 84.2 FL (ref 81.4–97.8)
PLATELET # BLD AUTO: 186 K/UL (ref 164–446)
PMV BLD AUTO: 10.8 FL (ref 9–12.9)
POTASSIUM SERPL-SCNC: 4.1 MMOL/L (ref 3.6–5.5)
PROT SERPL-MCNC: 6.9 G/DL (ref 6–8.2)
PROTHROMBIN TIME: 17.5 SEC (ref 12–14.6)
RBC # BLD AUTO: 5.12 M/UL (ref 4.7–6.1)
SODIUM SERPL-SCNC: 136 MMOL/L (ref 135–145)
WBC # BLD AUTO: 8.3 K/UL (ref 4.8–10.8)

## 2018-08-24 PROCEDURE — 85730 THROMBOPLASTIN TIME PARTIAL: CPT

## 2018-08-24 PROCEDURE — 305949 HCHG RED TRAUMA ACT PRE-NOTIFY NO CC

## 2018-08-24 PROCEDURE — 72131 CT LUMBAR SPINE W/O DYE: CPT

## 2018-08-24 PROCEDURE — 85610 PROTHROMBIN TIME: CPT

## 2018-08-24 PROCEDURE — 71045 X-RAY EXAM CHEST 1 VIEW: CPT

## 2018-08-24 PROCEDURE — 70450 CT HEAD/BRAIN W/O DYE: CPT

## 2018-08-24 PROCEDURE — 93005 ELECTROCARDIOGRAM TRACING: CPT | Performed by: EMERGENCY MEDICINE

## 2018-08-24 PROCEDURE — 99284 EMERGENCY DEPT VISIT MOD MDM: CPT

## 2018-08-24 PROCEDURE — 72128 CT CHEST SPINE W/O DYE: CPT

## 2018-08-24 PROCEDURE — 80053 COMPREHEN METABOLIC PANEL: CPT

## 2018-08-24 PROCEDURE — 85027 COMPLETE CBC AUTOMATED: CPT

## 2018-08-24 NOTE — DISCHARGE PLANNING
Called staff member, Wicho, from [facility] coming to  patient and asked him to go to the the [facility] area in the parking lot rather than the ambulance bay.  He stated understanding.

## 2018-08-24 NOTE — CONSULTS
Trauma Consult  8/24/2018      CC: Trauma The patient was triaged as a Trauma Red in accordance with established pre hospital protols. An expeditious primary and secondary survey with required adjuncts was conducted. See Trauma Narrator for full details.    HPI Mr Sebastián Castro is a very pleasant 53 y.o. Male report syncope  fall rehab facility with loss of motor and sensation in his leg  He was at rehab after hospital 7/19-7/25 due to syncope  fall with loss of motor and sensation in his leg Seen neurosurgery at that time He reporto similar occurrences in past   He states he is in nor pain  He states no other injuries  He reports normal use and sensation arms with loss of sensation and function waist down    He states no headache no change in vision  He states no chest pain no difficulty breathing  He states no abdominal pain or discomfort or nausea      Past Medical History:   Diagnosis Date   • A-fib (HCC)    • Chest pain    • Diabetes (HCC)    • Diabetic neuropathy (HCC)    • Hypercholesterolemia    • Hypertension    • Morbid obesity ([facility])    • Noncompliance    • Normal cardiac stress test        Past Surgical History:   Procedure Laterality Date   • CARDIAC CATH  07/21/2018    EF 45%, normal coronaries.   • IRRIGATION & DEBRIDEMENT ORTHO Right 2/19/2018    Procedure: IRRIGATION & DEBRIDEMENT ORTHO-FOOT;  Surgeon: Kirby Lopez M.D.;  Location: SURGERY [facility];  Service: Orthopedics   • TOE AMPUTATION Right 1/17/2018    Procedure: TOE AMPUTATION-1ST RAY;  Surgeon: Doug Delong M.D.;  Location: SURGERY [facility];  Service: Orthopedics   • TOE AMPUTATION Right 11/10/2017    Procedure: TOE AMPUTATION;  Surgeon: Osorio Leo M.D.;  Location: SURGERY [facility];  Service: Orthopedics       No current facility-administered medications for this encounter.      Current Outpatient Prescriptions   Medication Sig Dispense Refill   • amiodarone (CORDARONE) 200 MG Tab Take 1 Tab by mouth every  day. 30 Tab 0   • cyanocobalamin (VITAMIN B12) 1000 MCG Tab Take 1 Tab by mouth every day. 30 Tab 2   • lisinopril (PRINIVIL) 5 MG Tab Take 1 Tab by mouth every day. 30 Tab 0   • metoprolol (LOPRESSOR) 25 MG Tab Take 1 Tab by mouth 2 Times a Day. 60 Tab 0   • rivaroxaban (XARELTO) 20 MG Tab tablet Take 1 Tab by mouth with dinner. 30 Tab 0   • famotidine (PEPCID) 20 MG Tab Take 1 Tab by mouth 2 times a day. 60 Tab 3   • aspirin 81 MG EC tablet Take 1 Tab by mouth every day. 30 Tab 3   • metFORMIN (GLUCOPHAGE) 500 MG Tab Take 2 Tabs by mouth 2 times a day, with meals. 60 Tab 0   • pravastatin (PRAVACHOL) 20 MG Tab Take 1 Tab by mouth every evening. 30 Tab 11   • acetaminophen (TYLENOL) 325 MG Tab Take 2 Tabs by mouth every 6 hours as needed (Mild Pain; (Pain scale 1-3); Temp greater than 100.5 F). 30 Tab 0       Social History     Social History   • Marital status:      Spouse name: N/A   • Number of children: N/A   • Years of education: N/A     Occupational History   • Not on file.     Social History Main Topics   • Smoking status: Never Smoker   • Smokeless tobacco: Never Used   • Alcohol use No   • Drug use: No   • Sexual activity: Not on file     Other Topics Concern   • Not on file     Social History Narrative   • No narrative on file       No family history on file.    Allergies:  Daptomycin; Pcn [penicillins]; Unasyn [ampicillin-sulbactam sodium]; and Vancomycin    Review of Systems:  Positive as above otherwise negative    Physical Exam:  Blood pressure 132/69, pulse 87, temperature 36.1 °C (97 °F), resp. rate 20, weight 120.2 kg (265 lb), SpO2 96 %.    Constitutional: Awake, alert, oriented x3. No acute distress.   Head: No cephalohematoma. Pupils reactive bilaterally.  No bleeding ears nose or mouth  Neck: No tracheal deviation. No midline cervical spine tenderness.  No stridor.  Cardiovascular: Normal rate, skin warm brisk capillary refill   pulmonary/Chest: Breathing with ease no cough no  respiratory distress no chest wall tenderness  Abdominal: Soft, nondistended.Nontender to palpation. Pelvis is stable to anterior-posterior compression.   Musculoskeletal: Upper extremities warm dry   No tenderness of wrists elbows or shoulders   Reports no sensation no motor below the waist   No evidence redness swelling of ankles or knees   Prior great toe amputation site healed no open wounds   back: Midline thoracic and lumbar spines are tender to palpation..  Neurological: GCS 15 E4 V5 M6.  Reports loss of sensation and motor below the waist  Skin: Skin is warm and dry.  Psychiatric:  Normal mood and affect.  Behavior is appropriate.       Labs:  Recent Labs      08/24/18   1206   WBC  8.3   RBC  5.12   HEMOGLOBIN  14.1   HEMATOCRIT  43.1   MCV  84.2   MCH  27.5   MCHC  32.7*   RDW  42.3   PLATELETCT  186   MPV  10.8     Recent Labs      08/24/18   1206   SODIUM  136   POTASSIUM  4.1   CHLORIDE  104   CO2  27   GLUCOSE  99   BUN  20   CREATININE  0.76   CALCIUM  9.4     Recent Labs      08/24/18   1206   APTT  36.7*   INR  1.47*     Recent Labs      08/24/18   1206   ASTSGOT  19   ALTSGPT  24   TBILIRUBIN  0.7   ALKPHOSPHAT  79   GLOBULIN  3.4   INR  1.47*       Radiology:  CT-TSPINE W/O PLUS RECONS   Final Result      No acute fracture thoracic spine identified.      CT-LSPINE W/O PLUS RECONS   Final Result      No acute fracture or listhesis of the lumbar spine.      CT-HEAD W/O   Final Result      No intracranial mass effect or acute hemorrhage.      DX-CHEST-LIMITED (1 VIEW)    (Results Pending)         Assessment: This is a 53 y.o. Report syncope fall with loss of sensation and motor both legs at rehab center  At rehab center receiving therapy after hospitalization similar episode last month    Recommend:   Syncope evaluation  Follow-up neurosurgical evaluation recommendations  Available to assist as needed.    Discussed with ED provider    Eric Kearns MD, FACS  [facility] [phone]

## 2018-08-24 NOTE — ED TRIAGE NOTES
"Pt to .  Chief Complaint   Patient presents with   • Syncope     pt states \"he woke up on the floor and then could not move his legs\"    • Numbness     to bilateral legs/pt states he is unable to move bilateral legs after fall. pt upgraded to trauma red    • Head Ache   • Trauma Red     "

## 2018-08-24 NOTE — PROGRESS NOTES
Rec'd a call from Dr. White. Discussed case with Dr. Taveras. We will see the patient in our office in 1-2 weeks for followup.

## 2018-08-24 NOTE — ED NOTES
Break RN: Per  pt will be picked up via taxi by [facility] employee when pt is ready for dc.

## 2018-08-24 NOTE — ED PROVIDER NOTES
ED Provider Note    Scribed for Zachary White M.D. by Sommer Woodward. 8/24/2018  12:04 PM    Primary care provider: Jerad Lomax M.D.  Means of arrival: Ambulance  History obtained from: Patient  History limited by: None    CHIEF COMPLAINT  •  Syncope  •  Back Pain  •  Extremity Weakness    HPI  Sebastián Castro is a 53 y.o. male who presents to the Emergency Department by ambulance for syncope, back pain and extremity weakness with an onset of today. Patient was transferred from [facility] where he was admitted for extremity weakness after a fall. History of congenital spinal stenosis at C4-5 per chart review. Patient ambulated to the restroom this morning and experienced a syncopal episode without prodrome. He woke up on the floor with an acute onset of constant, mid and low back pain. He notes new weakness, numbness and tingling to his bilateral lower extremities. He is experiencing difficulty with movement of his lower extremities. Associated symptoms include resolved headache and nausea. He notes chest discomfort similar to how he feels with atrial fibrillation. Negative for head injury, seizures, vomiting or abdominal pain. Patient is on Xarelto.      REVIEW OF SYSTEMS  Pertinent positives include: ground level fall, syncope, mid back pain, low back pain, weakness, numbness and tingling to bilateral lower extremities, resolved headache, nausea and chest discomfort.  Pertinent negatives include: head injury, seizures, vomiting or abdominal pain.  10+ systems reviewed and negative. See HPI for further details. C.      PAST MEDICAL HISTORY  Past Medical History:   Diagnosis Date   • A-fib (HCC)    • Chest pain    • Diabetes (HCC)    • Diabetic neuropathy (HCC)    • Hypercholesterolemia    • Hypertension    • Morbid obesity (HCC)    • Noncompliance    • Normal cardiac stress test        FAMILY HISTORY  History reviewed. No pertinent family history.       SOCIAL HISTORY  Social History   Substance Use Topics   •  Smoking status: Never Smoker   • Smokeless tobacco: Never Used   • Alcohol use No     History   Drug Use No       SURGICAL HISTORY  Past Surgical History:   Procedure Laterality Date   • CARDIAC CATH  07/21/2018    EF 45%, normal coronaries.   • IRRIGATION & DEBRIDEMENT ORTHO Right 2/19/2018    Procedure: IRRIGATION & DEBRIDEMENT ORTHO-FOOT;  Surgeon: Kirby Lopez M.D.;  Location: SURGERY [facility];  Service: Orthopedics   • TOE AMPUTATION Right 1/17/2018    Procedure: TOE AMPUTATION-1ST RAY;  Surgeon: Doug Delong M.D.;  Location: SURGERY [facility];  Service: Orthopedics   • TOE AMPUTATION Right 11/10/2017    Procedure: TOE AMPUTATION;  Surgeon: Osorio Leo M.D.;  Location: SURGERY [facility];  Service: Orthopedics       CURRENT MEDICATIONS  No current facility-administered medications for this encounter.     Current Outpatient Prescriptions:   •  amiodarone (CORDARONE) 200 MG Tab, Take 1 Tab by mouth every day., Disp: 30 Tab, Rfl: 0  •  cyanocobalamin (VITAMIN B12) 1000 MCG Tab, Take 1 Tab by mouth every day., Disp: 30 Tab, Rfl: 2  •  lisinopril (PRINIVIL) 5 MG Tab, Take 1 Tab by mouth every day., Disp: 30 Tab, Rfl: 0  •  metoprolol (LOPRESSOR) 25 MG Tab, Take 1 Tab by mouth 2 Times a Day., Disp: 60 Tab, Rfl: 0  •  rivaroxaban (XARELTO) 20 MG Tab tablet, Take 1 Tab by mouth with dinner., Disp: 30 Tab, Rfl: 0  •  famotidine (PEPCID) 20 MG Tab, Take 1 Tab by mouth 2 times a day., Disp: 60 Tab, Rfl: 3  •  aspirin 81 MG EC tablet, Take 1 Tab by mouth every day., Disp: 30 Tab, Rfl: 3  •  metFORMIN (GLUCOPHAGE) 500 MG Tab, Take 2 Tabs by mouth 2 times a day, with meals., Disp: 60 Tab, Rfl: 0  •  pravastatin (PRAVACHOL) 20 MG Tab, Take 1 Tab by mouth every evening., Disp: 30 Tab, Rfl: 11  •  acetaminophen (TYLENOL) 325 MG Tab, Take 2 Tabs by mouth every 6 hours as needed (Mild Pain; (Pain scale 1-3); Temp greater than 100.5 F)., Disp: 30 Tab, Rfl: 0      ALLERGIES  Allergies   Allergen  Reactions   • Daptomycin Rash     Body rash  RXN=1/2018     • Pcn [Penicillins] Hives and Rash      Rash & hives  RXN=since a kid   • Unasyn [Ampicillin-Sulbactam Sodium] Hives, Itching and Swelling   • Vancomycin Rash     Red man syndrome       PHYSICAL EXAM  VITAL SIGNS: /69   Pulse 87   Temp 36.1 °C (97 °F)   Resp 20   Wt 120.2 kg (265 lb)   SpO2 96%   BMI 35.94 kg/m²   Reviewed and borderline blood pressure elevation    Constitutional: Well developed, Well nourished.  HENT: Normocephalic, atraumatic, bilateral external ears normal, oropharynx moist, No exudates or erythema. Airway intact.   Eyes: PERRLA, conjunctiva pink, no scleral icterus.   Neck: Mild C3 tenderness.   Cardiovascular: Regular rate and rhythm. No murmurs, rubs or gallops.   Respiratory: Lungs clear to auscultation bilaterally. No wheezes, rales, or rhonchi.   Abdominal:  Abdomen soft, non-tender, non distended. No rebound, or guarding.    Skin: No erythema, no rash.   Genitourinary: No costovertebral angle tenderness.   Musculoskeletal: Mild diffuse tenderness to thoracic and lumbar spine.   Neurologic: Alert & oriented x 3, Some quad effort against gravity bilateral lower extremities. Able to flex bilateral feet against gravity. cranial nerves 2-12 intact by passive exam.  Extensor hallucis longus ankle flexion and flexion 0.  Hip flexion to out of 5 bilateral.  Sensation apparently not intact to light touch according to the patient.  Deep tendon reflexes 0.  No focal deficit noted.  Psychiatric: Affect normal, Judgment normal, Mood normal.       DIFFERENTIAL DIAGNOSIS:  Spinal inal fracture, intracranial hemorrhage, disc herniation, somatoform disorder, malingering.    EKG Interpretation:  EKG Interpretation    Interpreted by me.  Indication: Syncope    Rhythm: irregularly irregular  Rate: Normal at 77  Axis: normal  Ectopy: none  Conduction: normal  ST Segments: no ST change  T Waves: no acute change  Q Waves: none  Comparison:      Clinical Impression: Rate controlled atrial fibrillation             RADIOLOGY/PROCEDURES  DX-CHEST-LIMITED (1 VIEW)   Final Result      No consolidation.      CT-TSPINE W/O PLUS RECONS   Final Result      No acute fracture thoracic spine identified.      CT-LSPINE W/O PLUS RECONS   Final Result      No acute fracture or listhesis of the lumbar spine.      CT-HEAD W/O   Final Result      No intracranial mass effect or acute hemorrhage.        Radiologist interpretation have been reviewed by me.       LABORATORY:  Results for orders placed or performed during the hospital encounter of 08/24/18   COMP METABOLIC PANEL   Result Value Ref Range    Sodium 136 135 - 145 mmol/L    Potassium 4.1 3.6 - 5.5 mmol/L    Chloride 104 96 - 112 mmol/L    Co2 27 20 - 33 mmol/L    Anion Gap 5.0 0.0 - 11.9    Glucose 99 65 - 99 mg/dL    Bun 20 8 - 22 mg/dL    Creatinine 0.76 0.50 - 1.40 mg/dL    Calcium 9.4 8.5 - 10.5 mg/dL    AST(SGOT) 19 12 - 45 U/L    ALT(SGPT) 24 2 - 50 U/L    Alkaline Phosphatase 79 30 - 99 U/L    Total Bilirubin 0.7 0.1 - 1.5 mg/dL    Albumin 3.5 3.2 - 4.9 g/dL    Total Protein 6.9 6.0 - 8.2 g/dL    Globulin 3.4 1.9 - 3.5 g/dL    A-G Ratio 1.0 g/dL   CBC WITHOUT DIFFERENTIAL   Result Value Ref Range    WBC 8.3 4.8 - 10.8 K/uL    RBC 5.12 4.70 - 6.10 M/uL    Hemoglobin 14.1 14.0 - 18.0 g/dL    Hematocrit 43.1 42.0 - 52.0 %    MCV 84.2 81.4 - 97.8 fL    MCH 27.5 27.0 - 33.0 pg    MCHC 32.7 (L) 33.7 - 35.3 g/dL    RDW 42.3 35.9 - 50.0 fL    Platelet Count 186 164 - 446 K/uL    MPV 10.8 9.0 - 12.9 fL   PROTHROMBIN TIME   Result Value Ref Range    PT 17.5 (H) 12.0 - 14.6 sec    INR 1.47 (H) 0.87 - 1.13   APTT   Result Value Ref Range    APTT 36.7 (H) 24.7 - 36.0 sec      Lab results reviewed by me.       ED COURSE:  Pertinent Labs & Imaging studies reviewed. (See chart for details)    12:00 PM Patient seen and examined as a trauma. Patient presents for syncope, back pain and extremity weakness.      Initial orders  in the Emergency Department included XR chest, CT lumbar spine, CT thoracic spine, Ct head and laboratory testing: CMP, estimated GFR, prothrombin time and APTT.      12:30 PM Obtained and reviewed patient's electronic medical record which indicates a history of atrial fibrillation. Reviewed discharge summary from 07/2018 admission. Patient was evaluated by Dr. Taveras, Neurosurgery, after the patient complained of paralysis of his lower extremities. CT lumbar spine and MRI thoracolumbar showed degenerative joint disease. He was cleared by Neurosurgery.     12:43 PM Dr. Kearns reviewed the patient's imaging. No intervention required by Trauma.    1:50 PM Spoke with Case Coordinator who will organize transport back to nursing facility.    1:53 PM Spoke with [facility] Nurse Practitioner, Neurosurgery, regarding the patient's last admission. Patient was evaluated by Dr. Taveras.     2:18 PM Nursing staff organized transport to [facility].     Discharge plan was discussed with the patient and includes following up with Dr. Lomax in one week. Return to the ED for new or worsening symptoms including focal weakness or numbness, groin numbness, bowel or bladder incontinence or any additional concerns. Patient is stable for discharge. Vital signs were reviewed: /69   Pulse 87   Temp 36.1 °C (97 °F)   Resp 20   Wt 120.2 kg (265 lb)   SpO2 96%   BMI 35.94 kg/m²        MEDICAL DECISION MAKING:  This patient presents with possible syncope with an increase in upper and lower back pain and recurrent weakness.  The same process happened last month.  Last admission he had complete MRIs of the spine with and without contrast and complete CT scans of the spine.  He has degenerative change and congenital C4-5 stenosis that is not severe and not amenable to intervention.  This is likely somatoform disorder.  There is no evidence of intracranial hemorrhage despite possible head injury and anticoagulation.  At this point there is  no need for further imaging.  Case discussed with the trauma surgeon who evaluated the patient.  Case discussed with the PA of Dr. Taveras.  Chart reviewed and Dr. Taveras had discharge the patient from care      PLAN:  The patient is referred to a primary physician for blood pressure management, diabetic screening, and for all other preventative health concerns.      Jerad Lomax M.D.  [address] [place] [id number]  [phone]    Schedule an appointment as soon as possible for a visit in 1 week      CONDITION: Patient discharged in stable condition.       FINAL IMPRESSION  1. Syncope, unspecified syncope type    2. Weakness of both lower extremities         ISommer (Scribe), am scribing for, and in the presence of, Zachary White M.D.    Electronically signed by: Sommer Woodward (Scribe), 8/24/2018    IZachary M.D. personally performed the services described in this documentation, as scribed by Sommer Woodward in my presence, and it is both accurate and complete.    The note accurately reflects work and decisions made by me.  Zachary White  8/24/2018  2:35 PM

## 2018-08-24 NOTE — ED NOTES
Pt discharged to [facility] employee [facility]. VSS prior to d/c. PIV discontinued prior to d/c w/ no complications. Pt taken out to taxi cab w/ employee of [facility] in good condition.

## 2018-08-24 NOTE — DISCHARGE PLANNING
Sw responded to trauma red upgrade.  Pt was OLESYA CHRISTENSEN from [facility] after a syncopal episode.  Pt was A&Ox4 upon arrival. Shia obtained the following pt information: pt was at SNF when he had a syncopal episode and can no longer feel his legs. SHAI met with pt at bedside who requested that SS contact his son Freddie ([phone]).  Shai was able to contact pts son and provide him with an update. Per Freddie, he is not legally related to the pt any longer as his mother  him. Freddie requested to be updated when more information regarding the pts condition is known. Oncoming SHAI updated and will remain available.

## 2018-08-24 NOTE — DISCHARGE PLANNING
Informed by physician that patient is ready to return to [facility].  [facility] called and they will send a taxi with a staff member to  patient.  RN and physician both made aware.

## 2018-09-09 ENCOUNTER — HOSPITAL ENCOUNTER (OUTPATIENT)
Facility: MEDICAL CENTER | Age: 53
End: 2018-09-15
Attending: EMERGENCY MEDICINE | Admitting: INTERNAL MEDICINE
Payer: MEDICAID

## 2018-09-09 ENCOUNTER — APPOINTMENT (OUTPATIENT)
Dept: RADIOLOGY | Facility: MEDICAL CENTER | Age: 53
End: 2018-09-09
Attending: EMERGENCY MEDICINE
Payer: MEDICAID

## 2018-09-09 PROBLEM — I42.8 NONISCHEMIC CARDIOMYOPATHY (HCC): Status: ACTIVE | Noted: 2018-09-09

## 2018-09-09 LAB
ALBUMIN SERPL BCP-MCNC: 3.6 G/DL (ref 3.2–4.9)
ALBUMIN/GLOB SERPL: 1.2 G/DL
ALP SERPL-CCNC: 82 U/L (ref 30–99)
ALT SERPL-CCNC: 33 U/L (ref 2–50)
ANION GAP SERPL CALC-SCNC: 11 MMOL/L (ref 0–11.9)
APTT PPP: 26.3 SEC (ref 24.7–36)
AST SERPL-CCNC: 23 U/L (ref 12–45)
BASOPHILS # BLD AUTO: 0.7 % (ref 0–1.8)
BASOPHILS # BLD: 0.06 K/UL (ref 0–0.12)
BILIRUB SERPL-MCNC: 0.8 MG/DL (ref 0.1–1.5)
BNP SERPL-MCNC: 167 PG/ML (ref 0–100)
BUN SERPL-MCNC: 28 MG/DL (ref 8–22)
CALCIUM SERPL-MCNC: 9.1 MG/DL (ref 8.5–10.5)
CHLORIDE SERPL-SCNC: 105 MMOL/L (ref 96–112)
CO2 SERPL-SCNC: 22 MMOL/L (ref 20–33)
CREAT SERPL-MCNC: 0.81 MG/DL (ref 0.5–1.4)
EKG IMPRESSION: NORMAL
EOSINOPHIL # BLD AUTO: 0.46 K/UL (ref 0–0.51)
EOSINOPHIL NFR BLD: 5.1 % (ref 0–6.9)
ERYTHROCYTE [DISTWIDTH] IN BLOOD BY AUTOMATED COUNT: 45.9 FL (ref 35.9–50)
GLOBULIN SER CALC-MCNC: 3.1 G/DL (ref 1.9–3.5)
GLUCOSE BLD-MCNC: 76 MG/DL (ref 65–99)
GLUCOSE SERPL-MCNC: 129 MG/DL (ref 65–99)
HCT VFR BLD AUTO: 38.8 % (ref 42–52)
HGB BLD-MCNC: 12.6 G/DL (ref 14–18)
IMM GRANULOCYTES # BLD AUTO: 0.02 K/UL (ref 0–0.11)
IMM GRANULOCYTES NFR BLD AUTO: 0.2 % (ref 0–0.9)
INR PPP: 1.09 (ref 0.87–1.13)
LIPASE SERPL-CCNC: 6 U/L (ref 11–82)
LYMPHOCYTES # BLD AUTO: 2.18 K/UL (ref 1–4.8)
LYMPHOCYTES NFR BLD: 24.2 % (ref 22–41)
MCH RBC QN AUTO: 28.1 PG (ref 27–33)
MCHC RBC AUTO-ENTMCNC: 32.5 G/DL (ref 33.7–35.3)
MCV RBC AUTO: 86.4 FL (ref 81.4–97.8)
MONOCYTES # BLD AUTO: 0.7 K/UL (ref 0–0.85)
MONOCYTES NFR BLD AUTO: 7.8 % (ref 0–13.4)
NEUTROPHILS # BLD AUTO: 5.58 K/UL (ref 1.82–7.42)
NEUTROPHILS NFR BLD: 62 % (ref 44–72)
NRBC # BLD AUTO: 0 K/UL
NRBC BLD-RTO: 0 /100 WBC
PLATELET # BLD AUTO: 209 K/UL (ref 164–446)
PMV BLD AUTO: 11.9 FL (ref 9–12.9)
POTASSIUM SERPL-SCNC: 3.4 MMOL/L (ref 3.6–5.5)
PROT SERPL-MCNC: 6.7 G/DL (ref 6–8.2)
PROTHROMBIN TIME: 13.8 SEC (ref 12–14.6)
RBC # BLD AUTO: 4.49 M/UL (ref 4.7–6.1)
SODIUM SERPL-SCNC: 138 MMOL/L (ref 135–145)
TROPONIN I SERPL-MCNC: <0.01 NG/ML (ref 0–0.04)
WBC # BLD AUTO: 9 K/UL (ref 4.8–10.8)

## 2018-09-09 PROCEDURE — 96376 TX/PRO/DX INJ SAME DRUG ADON: CPT

## 2018-09-09 PROCEDURE — G0378 HOSPITAL OBSERVATION PER HR: HCPCS

## 2018-09-09 PROCEDURE — 93005 ELECTROCARDIOGRAM TRACING: CPT

## 2018-09-09 PROCEDURE — 82962 GLUCOSE BLOOD TEST: CPT

## 2018-09-09 PROCEDURE — 84484 ASSAY OF TROPONIN QUANT: CPT

## 2018-09-09 PROCEDURE — 93005 ELECTROCARDIOGRAM TRACING: CPT | Performed by: EMERGENCY MEDICINE

## 2018-09-09 PROCEDURE — 700101 HCHG RX REV CODE 250: Performed by: EMERGENCY MEDICINE

## 2018-09-09 PROCEDURE — 700111 HCHG RX REV CODE 636 W/ 250 OVERRIDE (IP): Performed by: INTERNAL MEDICINE

## 2018-09-09 PROCEDURE — 700101 HCHG RX REV CODE 250: Performed by: INTERNAL MEDICINE

## 2018-09-09 PROCEDURE — 700102 HCHG RX REV CODE 250 W/ 637 OVERRIDE(OP): Performed by: INTERNAL MEDICINE

## 2018-09-09 PROCEDURE — 83690 ASSAY OF LIPASE: CPT

## 2018-09-09 PROCEDURE — 96374 THER/PROPH/DIAG INJ IV PUSH: CPT

## 2018-09-09 PROCEDURE — 83880 ASSAY OF NATRIURETIC PEPTIDE: CPT

## 2018-09-09 PROCEDURE — 85610 PROTHROMBIN TIME: CPT

## 2018-09-09 PROCEDURE — 99285 EMERGENCY DEPT VISIT HI MDM: CPT

## 2018-09-09 PROCEDURE — 36415 COLL VENOUS BLD VENIPUNCTURE: CPT

## 2018-09-09 PROCEDURE — 71045 X-RAY EXAM CHEST 1 VIEW: CPT

## 2018-09-09 PROCEDURE — 80053 COMPREHEN METABOLIC PANEL: CPT

## 2018-09-09 PROCEDURE — 85730 THROMBOPLASTIN TIME PARTIAL: CPT

## 2018-09-09 PROCEDURE — 85025 COMPLETE CBC W/AUTO DIFF WBC: CPT

## 2018-09-09 PROCEDURE — A9270 NON-COVERED ITEM OR SERVICE: HCPCS | Performed by: INTERNAL MEDICINE

## 2018-09-09 PROCEDURE — 99223 1ST HOSP IP/OBS HIGH 75: CPT | Performed by: INTERNAL MEDICINE

## 2018-09-09 PROCEDURE — 96375 TX/PRO/DX INJ NEW DRUG ADDON: CPT

## 2018-09-09 RX ORDER — DEXTROSE MONOHYDRATE 25 G/50ML
25 INJECTION, SOLUTION INTRAVENOUS
Status: DISCONTINUED | OUTPATIENT
Start: 2018-09-09 | End: 2018-09-15 | Stop reason: HOSPADM

## 2018-09-09 RX ORDER — POLYETHYLENE GLYCOL 3350 17 G/17G
1 POWDER, FOR SOLUTION ORAL
Status: DISCONTINUED | OUTPATIENT
Start: 2018-09-09 | End: 2018-09-15 | Stop reason: HOSPADM

## 2018-09-09 RX ORDER — ONDANSETRON 4 MG/1
4 TABLET, ORALLY DISINTEGRATING ORAL EVERY 4 HOURS PRN
Status: DISCONTINUED | OUTPATIENT
Start: 2018-09-09 | End: 2018-09-15 | Stop reason: HOSPADM

## 2018-09-09 RX ORDER — PRAVASTATIN SODIUM 20 MG
20 TABLET ORAL EVERY EVENING
Status: DISCONTINUED | OUTPATIENT
Start: 2018-09-09 | End: 2018-09-15 | Stop reason: HOSPADM

## 2018-09-09 RX ORDER — METOPROLOL TARTRATE 1 MG/ML
5 INJECTION, SOLUTION INTRAVENOUS ONCE
Status: COMPLETED | OUTPATIENT
Start: 2018-09-09 | End: 2018-09-09

## 2018-09-09 RX ORDER — BISACODYL 10 MG
10 SUPPOSITORY, RECTAL RECTAL
Status: DISCONTINUED | OUTPATIENT
Start: 2018-09-09 | End: 2018-09-15 | Stop reason: HOSPADM

## 2018-09-09 RX ORDER — PROMETHAZINE HYDROCHLORIDE 25 MG/1
12.5-25 TABLET ORAL EVERY 4 HOURS PRN
Status: DISCONTINUED | OUTPATIENT
Start: 2018-09-09 | End: 2018-09-15 | Stop reason: HOSPADM

## 2018-09-09 RX ORDER — PROMETHAZINE HYDROCHLORIDE 25 MG/1
12.5-25 SUPPOSITORY RECTAL EVERY 4 HOURS PRN
Status: DISCONTINUED | OUTPATIENT
Start: 2018-09-09 | End: 2018-09-15 | Stop reason: HOSPADM

## 2018-09-09 RX ORDER — FUROSEMIDE 10 MG/ML
20 INJECTION INTRAMUSCULAR; INTRAVENOUS
Status: DISCONTINUED | OUTPATIENT
Start: 2018-09-09 | End: 2018-09-10

## 2018-09-09 RX ORDER — ONDANSETRON 2 MG/ML
4 INJECTION INTRAMUSCULAR; INTRAVENOUS EVERY 4 HOURS PRN
Status: DISCONTINUED | OUTPATIENT
Start: 2018-09-09 | End: 2018-09-15 | Stop reason: HOSPADM

## 2018-09-09 RX ORDER — LISINOPRIL 5 MG/1
5 TABLET ORAL DAILY
Status: DISCONTINUED | OUTPATIENT
Start: 2018-09-10 | End: 2018-09-15 | Stop reason: HOSPADM

## 2018-09-09 RX ORDER — METOPROLOL TARTRATE 1 MG/ML
5 INJECTION, SOLUTION INTRAVENOUS
Status: DISCONTINUED | OUTPATIENT
Start: 2018-09-09 | End: 2018-09-15 | Stop reason: HOSPADM

## 2018-09-09 RX ORDER — AMOXICILLIN 250 MG
2 CAPSULE ORAL 2 TIMES DAILY
Status: DISCONTINUED | OUTPATIENT
Start: 2018-09-09 | End: 2018-09-15 | Stop reason: HOSPADM

## 2018-09-09 RX ORDER — ACETAMINOPHEN 325 MG/1
650 TABLET ORAL EVERY 6 HOURS PRN
Status: DISCONTINUED | OUTPATIENT
Start: 2018-09-09 | End: 2018-09-15 | Stop reason: HOSPADM

## 2018-09-09 RX ORDER — POTASSIUM CHLORIDE 20 MEQ/1
40 TABLET, EXTENDED RELEASE ORAL ONCE
Status: COMPLETED | OUTPATIENT
Start: 2018-09-09 | End: 2018-09-09

## 2018-09-09 RX ADMIN — SENNOSIDES AND DOCUSATE SODIUM 2 TABLET: 8.6; 5 TABLET ORAL at 18:08

## 2018-09-09 RX ADMIN — METOPROLOL TARTRATE 5 MG: 1 INJECTION, SOLUTION INTRAVENOUS at 18:09

## 2018-09-09 RX ADMIN — FUROSEMIDE 20 MG: 10 INJECTION, SOLUTION INTRAMUSCULAR; INTRAVENOUS at 18:09

## 2018-09-09 RX ADMIN — RIVAROXABAN 20 MG: 20 TABLET, FILM COATED ORAL at 18:00

## 2018-09-09 RX ADMIN — PRAVASTATIN SODIUM 20 MG: 20 TABLET ORAL at 18:22

## 2018-09-09 RX ADMIN — METOPROLOL TARTRATE 5 MG: 1 INJECTION, SOLUTION INTRAVENOUS at 15:34

## 2018-09-09 RX ADMIN — METOPROLOL TARTRATE 5 MG: 1 INJECTION, SOLUTION INTRAVENOUS at 17:42

## 2018-09-09 RX ADMIN — POTASSIUM CHLORIDE 40 MEQ: 1500 TABLET, EXTENDED RELEASE ORAL at 18:09

## 2018-09-09 ASSESSMENT — ENCOUNTER SYMPTOMS
FEVER: 0
VOMITING: 0
DIARRHEA: 0
DEPRESSION: 0
FOCAL WEAKNESS: 0
HEARTBURN: 0
COUGH: 0
NERVOUS/ANXIOUS: 0
BLURRED VISION: 0
INSOMNIA: 0
HEADACHES: 0
EYE DISCHARGE: 0
WEIGHT LOSS: 0
NECK PAIN: 0
SEIZURES: 0
MYALGIAS: 0
CHILLS: 0
NAUSEA: 0
BACK PAIN: 0
STRIDOR: 0
EYE REDNESS: 0
EYE PAIN: 0
SHORTNESS OF BREATH: 0
DIZZINESS: 0
PALPITATIONS: 1
ORTHOPNEA: 0
SPUTUM PRODUCTION: 0
ABDOMINAL PAIN: 0

## 2018-09-09 ASSESSMENT — PAIN SCALES - GENERAL
PAINLEVEL_OUTOF10: 0
PAINLEVEL_OUTOF10: 7

## 2018-09-09 NOTE — H&P
Hospital Medicine History and Physical      Date of Service  9/9/2018    Chief Complaint  Chief Complaint   Patient presents with   • Chest Pain     pt reports CP starting about 30minutes prior to EMS arrival and near syncope.  pt reports trip and fall 3 days ago at work, resulting in right side rib pain and diff breathing.  pt reports similar incident 3 weeks ago, was in afib with rvr at that time.        History of Presenting Illness  Matthew is a 53 y.o. male PMH of morbid obesity, paroxysmal atrial fibrillation, nonischemic cardiomyopathy with LVEF 40%, who presents with palpitation starting earlier today.  Associated with tightness her chest and shortness of breath.  In the ER he was found to have atrial fibrillation with rapid ventricular response with heart rate around 140s and received IV metoprolol and heart rate went down to around 110.  He also was found to have lower extremity swelling which he does not recall how long has been going on.  In July 2018  he had he had an angiogram done with normal coronary artery.    Primary Care Physician  Jerad Lomax M.D.      Code Status  Full code    Review of Systems  Review of Systems   Constitutional: Negative for chills, fever and weight loss.   HENT: Negative for congestion and nosebleeds.    Eyes: Negative for blurred vision, pain, discharge and redness.   Respiratory: Negative for cough, sputum production, shortness of breath and stridor.    Cardiovascular: Positive for chest pain, palpitations and leg swelling. Negative for orthopnea.   Gastrointestinal: Negative for abdominal pain, diarrhea, heartburn, nausea and vomiting.   Genitourinary: Negative for dysuria, frequency and urgency.   Musculoskeletal: Negative for back pain, myalgias and neck pain.   Skin: Negative for itching and rash.   Neurological: Negative for dizziness, focal weakness, seizures and headaches.   Psychiatric/Behavioral: Negative for depression. The patient is not nervous/anxious and  does not have insomnia.      Please see HPI, all other systems were reviewed and are negative (AMA/CMS criteria)     Past Medical History  Past Medical History:   Diagnosis Date   • A-fib (HCC)    • Chest pain    • Diabetes (HCC)    • Diabetic neuropathy (HCC)    • Hypercholesterolemia    • Hypertension    • Morbid obesity (HCC)    • Noncompliance    • Normal cardiac stress test        Surgical History  Past Surgical History:   Procedure Laterality Date   • CARDIAC CATH  07/21/2018    EF 45%, normal coronaries.   • IRRIGATION & DEBRIDEMENT ORTHO Right 2/19/2018    Procedure: IRRIGATION & DEBRIDEMENT ORTHO-FOOT;  Surgeon: Kirby Lopez M.D.;  Location: SURGERY [facility];  Service: Orthopedics   • TOE AMPUTATION Right 1/17/2018    Procedure: TOE AMPUTATION-1ST RAY;  Surgeon: Doug Delong M.D.;  Location: SURGERY [facility];  Service: Orthopedics   • TOE AMPUTATION Right 11/10/2017    Procedure: TOE AMPUTATION;  Surgeon: Osorio Leo M.D.;  Location: SURGERY [facility];  Service: Orthopedics       Medications  No current facility-administered medications on file prior to encounter.      Current Outpatient Prescriptions on File Prior to Encounter   Medication Sig Dispense Refill   • lisinopril (PRINIVIL) 5 MG Tab Take 1 Tab by mouth every day. 30 Tab 0   • metoprolol (LOPRESSOR) 25 MG Tab Take 1 Tab by mouth 2 Times a Day. 60 Tab 0   • rivaroxaban (XARELTO) 20 MG Tab tablet Take 1 Tab by mouth with dinner. 30 Tab 0   • aspirin 81 MG EC tablet Take 1 Tab by mouth every day. 30 Tab 3   • metFORMIN (GLUCOPHAGE) 500 MG Tab Take 2 Tabs by mouth 2 times a day, with meals. 60 Tab 0   • pravastatin (PRAVACHOL) 20 MG Tab Take 1 Tab by mouth every evening. 30 Tab 11   • acetaminophen (TYLENOL) 325 MG Tab Take 2 Tabs by mouth every 6 hours as needed (Mild Pain; (Pain scale 1-3); Temp greater than 100.5 F). 30 Tab 0     Family History  History reviewed. No pertinent family history.      Social  History  Social History   Substance Use Topics   • Smoking status: Never Smoker   • Smokeless tobacco: Never Used   • Alcohol use No       Allergies  Allergies   Allergen Reactions   • Daptomycin Rash     Body rash  RXN=2018     • Pcn [Penicillins] Hives and Rash      Rash & hives  RXN=since a kid   • Unasyn [Ampicillin-Sulbactam Sodium] Hives, Itching and Swelling   • Vancomycin Rash     Red man syndrome        Physical Exam  Laboratory   Hemodynamics  Temp (24hrs), Av.2 °C (98.9 °F), Min:37.2 °C (98.9 °F), Max:37.2 °C (98.9 °F)   Temperature: 37.2 °C (98.9 °F)  Pulse  Av.7  Min: 113  Max: 136 Heart Rate (Monitored): (!) 127  Blood Pressure: 140/83, NIBP: 141/95      Respiratory      Respiration: 16, Pulse Oximetry: 100 %        RUL Breath Sounds: Clear, RML Breath Sounds: Clear, RLL Breath Sounds: Clear, ELIAS Breath Sounds: Clear, LLL Breath Sounds: Clear    Physical Exam   Constitutional: He is oriented to person, place, and time. No distress.   Morbidly obese   HENT:   Head: Normocephalic and atraumatic.   Mouth/Throat: Oropharynx is clear and moist.   Eyes: Pupils are equal, round, and reactive to light. Conjunctivae and EOM are normal.   Neck: Normal range of motion. Neck supple. No tracheal deviation present. No thyromegaly present.   Cardiovascular: Normal rate and regular rhythm.    No murmur heard.  Pulmonary/Chest: Effort normal and breath sounds normal. No respiratory distress. He has no wheezes.   Abdominal: Soft. Bowel sounds are normal. He exhibits no distension. There is no tenderness.   Musculoskeletal: He exhibits edema. He exhibits no tenderness.   Neurological: He is alert and oriented to person, place, and time. No cranial nerve deficit.   Skin: Skin is warm and dry. He is not diaphoretic. No erythema.   Psychiatric: He has a normal mood and affect. His behavior is normal. Thought content normal.       Recent Labs      18   1526   WBC  9.0   RBC  4.49*   HEMOGLOBIN  12.6*    HEMATOCRIT  38.8*   MCV  86.4   MCH  28.1   MCHC  32.5*   RDW  45.9   PLATELETCT  209   MPV  11.9     Recent Labs      09/09/18   1526   SODIUM  138   POTASSIUM  3.4*   CHLORIDE  105   CO2  22   GLUCOSE  129*   BUN  28*   CREATININE  0.81   CALCIUM  9.1     Recent Labs      09/09/18   1526   ALTSGPT  33   ASTSGOT  23   ALKPHOSPHAT  82   TBILIRUBIN  0.8   LIPASE  6*   GLUCOSE  129*     Recent Labs      09/09/18   1526   APTT  26.3   INR  1.09     Recent Labs      09/09/18   1526   BNPBTYPENAT  167*         Lab Results   Component Value Date    TROPONINI <0.01 09/09/2018       Imaging  DX-CHEST-LIMITED (1 VIEW)   Final Result         Stable cardiomegaly. No pulmonary infiltrates or consolidations are noted.        EKG  per my independant read:  QTc: 451, HR: 122, afib, no ST/T changes     Assessment/Plan     I anticipate this patient is appropriate for observation status at this time.    Paroxysmal A-fib (HCC)- (present on admission)   Assessment & Plan    A. fib with RVR  Continue metoprolol 25 mg twice daily with IV PRN meds with parameters  Monitor on telemetry closely  Target potassium above 4, magnesium above 2  On Xarelto        Nonischemic cardiomyopathy (HCC)- (present on admission)   Assessment & Plan    Echo July 2018 showed LVEF 40%  Started on IV Lasix 20 mg twice daily  Continue lisinopril and metoprolol  Adjust medication as needed        Hypokalemia- (present on admission)   Assessment & Plan    Replete as needed        Essential hypertension- (present on admission)   Assessment & Plan    Stable  Continue outpatient meds        Type 2 diabetes mellitus, without long-term current use of insulin (HCC)- (present on admission)   Assessment & Plan    Holding metformin  Sliding scale insulin        Obesity- (present on admission)   Assessment & Plan    Diet and exercise education            Prophylaxis:  rivaroxaban

## 2018-09-09 NOTE — ED PROVIDER NOTES
ED Provider Note    Scribed for Airam Jordan M.D. by Glenna Ortiz. 9/9/2018, 2:55 PM.    Primary care provider: Jerad Lomax M.D.  Means of arrival: ambulance  History obtained from: patient  History limited by: none    CHIEF COMPLAINT  Chief Complaint   Patient presents with   • Chest Pain     pt reports CP starting about 30minutes prior to EMS arrival and near syncope.  pt reports trip and fall 3 days ago at work, resulting in right side rib pain and diff breathing.  pt reports similar incident 3 weeks ago, was in afib with rvr at that time.        HPI  Sebastián Castro is a 53 y.o. male who presents to the Emergency Department for chest pain, described more as tightness, onset within 30 minutes prior to arrival in the ED with associated near syncope sensation and worsening of his baseline lower extremity edema. Patient is also describing some shortness of breath, experienced over the last few days as well, exacerbated whenever he lays flat. Patient is still experiencing chest pain at this time.    Patient has a history of atrial fibrillation normally managed with Metoprolol, Xarelto, amiodarone, reporting no recently missed doses. He reports that when his atrial fibrillation normally flairs up such as he is experiencing today, he experienced chest pain until the atrial fibrillation stabilizes with the other corresponding symptoms. Patient was recently discharged from [facility] following rehabilitation after another episode of atrial fibrillation flair up that also lead to chest pain and shortness of breath. Patient follows with Dr. Mustafa, Cardiology.  No complaints of fever, scrotal swelling/redness however he is experiencing a decreased urinary output at this time.    REVIEW OF SYSTEMS  Pertinent positives include chest pain, shortness of breath, decreased urine output. Pertinent negatives include no fever, scrotal swelling/redness. All other systems reviewed and negative.     PAST MEDICAL HISTORY   has a  past medical history of A-fib (HCC); Chest pain; Diabetes (HCC); Diabetic neuropathy (HCC); Hypercholesterolemia; Hypertension; Morbid obesity (HCC); Noncompliance; and Normal cardiac stress test.    SURGICAL HISTORY   has a past surgical history that includes toe amputation (Right, 11/10/2017); toe amputation (Right, 1/17/2018); irrigation & debridement ortho (Right, 2/19/2018); and cardiac cath (07/21/2018).    SOCIAL HISTORY  Social History   Substance Use Topics   • Smoking status: Never Smoker   • Smokeless tobacco: Never Used   • Alcohol use No      History   Drug Use No       FAMILY HISTORY  History reviewed. No pertinent family history.    CURRENT MEDICATIONS  Home Medications     Reviewed by James Ruggiero (Pharmacy Tech) on 09/09/18 at 1711  Med List Status: Complete   Medication Last Dose Status   acetaminophen (TYLENOL) 325 MG Tab 9/9/2018 Active   aspirin 81 MG EC tablet 9/9/2018 Active   lisinopril (PRINIVIL) 5 MG Tab 9/9/2018 Active   metFORMIN (GLUCOPHAGE) 500 MG Tab 9/9/2018 Active   metoprolol (LOPRESSOR) 25 MG Tab 9/9/2018 Active   pravastatin (PRAVACHOL) 20 MG Tab 9/8/2018 Active   rivaroxaban (XARELTO) 20 MG Tab tablet 9/8/2018 Active                ALLERGIES  Allergies   Allergen Reactions   • Daptomycin Rash     Body rash  RXN=1/2018     • Pcn [Penicillins] Hives and Rash      Rash & hives  RXN=since a kid   • Unasyn [Ampicillin-Sulbactam Sodium] Hives, Itching and Swelling   • Vancomycin Rash     Red man syndrome       PHYSICAL EXAM  VITAL SIGNS: /83   Pulse (!) 113   Temp 37.2 °C (98.9 °F)   Resp 20   Ht 1.829 m (6')   Wt (!) 130 kg (286 lb 9.6 oz)   BMI 38.87 kg/m²     Constitutional:  53 year old male laying on the gurney, very red in the face, in no obvious distress , able to answer questions  HENT: Nose is normal in appearance without rhinorrhea, external ears are normal,  moist mucous membranes  Eyes: Anicteric,  pupils are equal round and reactive, there is no  conjunctival drainage or pallor   Neck: The trachea is midline, there is no obvious mass or meningeal signs  Cardiovascular: Equal radial pulsation, rapid irregular rate without murmurs gallops or rubs  Thorax & Lungs: Respiratory rate and effort are normal. There is normal chest excursion with respiration.  No wheezes rhonchi or rales noted.  Abdomen: Abdomen is obese in appearance,  normal bowel sounds, no pain with cough, nonpulsatile  Musculoskeletal:. Actively moves all 4 extremities, pitting edema BLE to mid shin  Skin: Visualized skin is warm, no erythema, no rash.  Neurologic:  Cranial nerves II through XII are intact there is no focal abnormality noted.  Psychiatric: Normal mood and mentation    DIAGNOSTIC STUDIES / PROCEDURES    LABS  Results for orders placed or performed during the hospital encounter of 09/09/18   Troponin   Result Value Ref Range    Troponin I <0.01 0.00 - 0.04 ng/mL   Btype Natriuretic Peptide   Result Value Ref Range    B Natriuretic Peptide 167 (H) 0 - 100 pg/mL   CBC with Differential   Result Value Ref Range    WBC 9.0 4.8 - 10.8 K/uL    RBC 4.49 (L) 4.70 - 6.10 M/uL    Hemoglobin 12.6 (L) 14.0 - 18.0 g/dL    Hematocrit 38.8 (L) 42.0 - 52.0 %    MCV 86.4 81.4 - 97.8 fL    MCH 28.1 27.0 - 33.0 pg    MCHC 32.5 (L) 33.7 - 35.3 g/dL    RDW 45.9 35.9 - 50.0 fL    Platelet Count 209 164 - 446 K/uL    MPV 11.9 9.0 - 12.9 fL    Neutrophils-Polys 62.00 44.00 - 72.00 %    Lymphocytes 24.20 22.00 - 41.00 %    Monocytes 7.80 0.00 - 13.40 %    Eosinophils 5.10 0.00 - 6.90 %    Basophils 0.70 0.00 - 1.80 %    Immature Granulocytes 0.20 0.00 - 0.90 %    Nucleated RBC 0.00 /100 WBC    Neutrophils (Absolute) 5.58 1.82 - 7.42 K/uL    Lymphs (Absolute) 2.18 1.00 - 4.80 K/uL    Monos (Absolute) 0.70 0.00 - 0.85 K/uL    Eos (Absolute) 0.46 0.00 - 0.51 K/uL    Baso (Absolute) 0.06 0.00 - 0.12 K/uL    Immature Granulocytes (abs) 0.02 0.00 - 0.11 K/uL    NRBC (Absolute) 0.00 K/uL   Complete Metabolic  Panel (CMP)   Result Value Ref Range    Sodium 138 135 - 145 mmol/L    Potassium 3.4 (L) 3.6 - 5.5 mmol/L    Chloride 105 96 - 112 mmol/L    Co2 22 20 - 33 mmol/L    Anion Gap 11.0 0.0 - 11.9    Glucose 129 (H) 65 - 99 mg/dL    Bun 28 (H) 8 - 22 mg/dL    Creatinine 0.81 0.50 - 1.40 mg/dL    Calcium 9.1 8.5 - 10.5 mg/dL    AST(SGOT) 23 12 - 45 U/L    ALT(SGPT) 33 2 - 50 U/L    Alkaline Phosphatase 82 30 - 99 U/L    Total Bilirubin 0.8 0.1 - 1.5 mg/dL    Albumin 3.6 3.2 - 4.9 g/dL    Total Protein 6.7 6.0 - 8.2 g/dL    Globulin 3.1 1.9 - 3.5 g/dL    A-G Ratio 1.2 g/dL   Prothrombin Time   Result Value Ref Range    PT 13.8 12.0 - 14.6 sec    INR 1.09 0.87 - 1.13   APTT   Result Value Ref Range    APTT 26.3 24.7 - 36.0 sec   Lipase   Result Value Ref Range    Lipase 6 (L) 11 - 82 U/L   ESTIMATED GFR   Result Value Ref Range    GFR If African American >60 >60 mL/min/1.73 m 2    GFR If Non African American >60 >60 mL/min/1.73 m 2   EKG (ER)   Result Value Ref Range    Report       [facility] Emergency [facility]    Test Date:  2018  Pt Name:    EMELY FLORES                Department: ER  MRN:        [id number]                      Room:        06  Gender:     Male                         Technician: [id number]  :        1965                   Requested By:ER TRIAGE PROTOCOL  Order #:    [id number]                    Reading MD:    Measurements  Intervals                                Axis  Rate:       122                          P:  [place]:                                      QRS:        -24  QRSD:       96                           T:          70  QT:         316  QTc:        451    Interpretive Statements  ATRIAL FIBRILLATION, V-RATE    BORDERLINE LEFT AXIS DEVIATION  LOW VOLTAGE IN FRONTAL LEADS  BORDERLINE R WAVE PROGRESSION, ANTERIOR LEADS  Compared to ECG 2018 12:21:17  No significant changes         All labs reviewed by me.    EKG  I interpreted this EKG myself.  This is a 12-lead  study.  The rhythm is atrial fibrillation with a rate of 122. QRS 96. No ST segment elevation myocardial infarction..    RADIOLOGY  DX-CHEST-LIMITED (1 VIEW)   Final Result         Stable cardiomegaly. No pulmonary infiltrates or consolidations are noted.        The radiologist's interpretation of all radiological studies have been reviewed by me.    COURSE & MEDICAL DECISION MAKING  Nursing notes and vital signs were reviewed. (See chart for details)  The patient's  records were reviewed, history was obtained from the patient.    The patient presents with atrial fibrillation with RVR and chest pain, new onset bilateral leg swelling, and the differential diagnosis includes but is not limited to heart failure, atrial fibrillation, ACS.       2:55 PM Initial orders in the Emergency Department included chest xray, UA, troponin, CBC, CMP, BNP, PTT, APTT, lipase, EKG and initial treatment in the Emergency Department included 5mg IV metoprolol. I informed the patient that we would evaluate for acute origins of his symptoms with lab work, imaging and EKG. He understands and agrees with treatment plan.    4:35 PM Spoke with Dr. Rome, Hospitalist, about the patient's condition. Agrees to admit the patient.    ED testing reveals /83   Pulse (!) 108   Temp 37.2 °C (98.9 °F)   Resp 20   Ht 1.829 m (6')   Wt (!) 130 kg (286 lb 9.6 oz)   SpO2 99%   BMI 38.87 kg/m²  after 5 mg of IV Metoprolol      DISPOSITION:  Patient will be admitted to Dr. Rome, Hospitalist in guarded condition.    FINAL IMPRESSION  Atrial fibrillation with RVR   IGlenna (Antonina), am scribing for, and in the presence of, Airam Jordan M.D..    Electronically signed by: Glenna Ortiz (Antonina), 9/9/2018    IAiram M.D. personally performed the services described in this documentation, as scribed by Glenna Ortiz in my presence, and it is both accurate and complete.    The note accurately reflects work and decisions  made by me.  Airam Jordan  9/9/2018  6:45 PM

## 2018-09-09 NOTE — ASSESSMENT & PLAN NOTE
A. fib with RVR with syncope on presentation; rate controlled now  Now on  metoprolol dose to 50 mg bid, with IV PRN meds with parameters  Target potassium above 4, magnesium above 2  On Xarelto

## 2018-09-09 NOTE — ASSESSMENT & PLAN NOTE
Echo July 2018 showed LVEF 40%; cath 7/18 reportedly with no CAD  Changed to oral lasix   Continue lisinopril and metoprolol  Adjust medication as needed

## 2018-09-10 PROBLEM — E87.6 HYPOKALEMIA: Status: RESOLVED | Noted: 2017-11-11 | Resolved: 2018-09-10

## 2018-09-10 PROBLEM — R39.198 DIFFICULTY VOIDING: Status: ACTIVE | Noted: 2018-09-10

## 2018-09-10 PROBLEM — R07.89 OTHER CHEST PAIN: Status: ACTIVE | Noted: 2018-09-10

## 2018-09-10 LAB
ALBUMIN SERPL BCP-MCNC: 3.6 G/DL (ref 3.2–4.9)
ALBUMIN/GLOB SERPL: 1.2 G/DL
ALP SERPL-CCNC: 72 U/L (ref 30–99)
ALT SERPL-CCNC: 27 U/L (ref 2–50)
ANION GAP SERPL CALC-SCNC: 9 MMOL/L (ref 0–11.9)
APPEARANCE UR: CLEAR
AST SERPL-CCNC: 21 U/L (ref 12–45)
BILIRUB SERPL-MCNC: 0.9 MG/DL (ref 0.1–1.5)
BILIRUB UR QL STRIP.AUTO: NEGATIVE
BUN SERPL-MCNC: 24 MG/DL (ref 8–22)
CALCIUM SERPL-MCNC: 9.3 MG/DL (ref 8.5–10.5)
CHLORIDE SERPL-SCNC: 105 MMOL/L (ref 96–112)
CO2 SERPL-SCNC: 27 MMOL/L (ref 20–33)
COLOR UR: YELLOW
CREAT SERPL-MCNC: 0.82 MG/DL (ref 0.5–1.4)
ERYTHROCYTE [DISTWIDTH] IN BLOOD BY AUTOMATED COUNT: 46.4 FL (ref 35.9–50)
GLOBULIN SER CALC-MCNC: 3 G/DL (ref 1.9–3.5)
GLUCOSE BLD-MCNC: 113 MG/DL (ref 65–99)
GLUCOSE BLD-MCNC: 125 MG/DL (ref 65–99)
GLUCOSE BLD-MCNC: 138 MG/DL (ref 65–99)
GLUCOSE BLD-MCNC: 96 MG/DL (ref 65–99)
GLUCOSE SERPL-MCNC: 119 MG/DL (ref 65–99)
GLUCOSE UR STRIP.AUTO-MCNC: NEGATIVE MG/DL
HCT VFR BLD AUTO: 40.1 % (ref 42–52)
HGB BLD-MCNC: 12.9 G/DL (ref 14–18)
KETONES UR STRIP.AUTO-MCNC: NEGATIVE MG/DL
LEUKOCYTE ESTERASE UR QL STRIP.AUTO: NEGATIVE
MCH RBC QN AUTO: 27.9 PG (ref 27–33)
MCHC RBC AUTO-ENTMCNC: 32.2 G/DL (ref 33.7–35.3)
MCV RBC AUTO: 86.8 FL (ref 81.4–97.8)
MICRO URNS: NORMAL
NITRITE UR QL STRIP.AUTO: NEGATIVE
PH UR STRIP.AUTO: 5 [PH]
PLATELET # BLD AUTO: 168 K/UL (ref 164–446)
PMV BLD AUTO: 11.4 FL (ref 9–12.9)
POTASSIUM SERPL-SCNC: 3.3 MMOL/L (ref 3.6–5.5)
PROT SERPL-MCNC: 6.6 G/DL (ref 6–8.2)
PROT UR QL STRIP: NEGATIVE MG/DL
RBC # BLD AUTO: 4.62 M/UL (ref 4.7–6.1)
RBC UR QL AUTO: NEGATIVE
SODIUM SERPL-SCNC: 141 MMOL/L (ref 135–145)
SP GR UR STRIP.AUTO: 1.02
UROBILINOGEN UR STRIP.AUTO-MCNC: 1 MG/DL
WBC # BLD AUTO: 8.2 K/UL (ref 4.8–10.8)

## 2018-09-10 PROCEDURE — 99232 SBSQ HOSP IP/OBS MODERATE 35: CPT | Performed by: INTERNAL MEDICINE

## 2018-09-10 PROCEDURE — 80053 COMPREHEN METABOLIC PANEL: CPT

## 2018-09-10 PROCEDURE — 85027 COMPLETE CBC AUTOMATED: CPT

## 2018-09-10 PROCEDURE — G0378 HOSPITAL OBSERVATION PER HR: HCPCS

## 2018-09-10 PROCEDURE — 700102 HCHG RX REV CODE 250 W/ 637 OVERRIDE(OP): Performed by: INTERNAL MEDICINE

## 2018-09-10 PROCEDURE — A9270 NON-COVERED ITEM OR SERVICE: HCPCS | Performed by: INTERNAL MEDICINE

## 2018-09-10 PROCEDURE — 36415 COLL VENOUS BLD VENIPUNCTURE: CPT

## 2018-09-10 PROCEDURE — 81003 URINALYSIS AUTO W/O SCOPE: CPT

## 2018-09-10 PROCEDURE — 700111 HCHG RX REV CODE 636 W/ 250 OVERRIDE (IP): Performed by: INTERNAL MEDICINE

## 2018-09-10 PROCEDURE — 82962 GLUCOSE BLOOD TEST: CPT

## 2018-09-10 RX ORDER — METOPROLOL TARTRATE 50 MG/1
50 TABLET, FILM COATED ORAL 2 TIMES DAILY
Status: DISCONTINUED | OUTPATIENT
Start: 2018-09-10 | End: 2018-09-15 | Stop reason: HOSPADM

## 2018-09-10 RX ORDER — POTASSIUM CHLORIDE 20 MEQ/1
40 TABLET, EXTENDED RELEASE ORAL 2 TIMES DAILY
Status: DISCONTINUED | OUTPATIENT
Start: 2018-09-10 | End: 2018-09-15 | Stop reason: HOSPADM

## 2018-09-10 RX ORDER — FUROSEMIDE 10 MG/ML
40 INJECTION INTRAMUSCULAR; INTRAVENOUS
Status: DISCONTINUED | OUTPATIENT
Start: 2018-09-10 | End: 2018-09-13

## 2018-09-10 RX ORDER — TRAMADOL HYDROCHLORIDE 50 MG/1
50 TABLET ORAL EVERY 6 HOURS PRN
Status: DISCONTINUED | OUTPATIENT
Start: 2018-09-10 | End: 2018-09-15 | Stop reason: HOSPADM

## 2018-09-10 RX ORDER — MORPHINE SULFATE 10 MG/ML
4 INJECTION, SOLUTION INTRAMUSCULAR; INTRAVENOUS EVERY 4 HOURS PRN
Status: DISCONTINUED | OUTPATIENT
Start: 2018-09-10 | End: 2018-09-15 | Stop reason: HOSPADM

## 2018-09-10 RX ADMIN — TRAMADOL HYDROCHLORIDE 50 MG: 50 TABLET, COATED ORAL at 20:33

## 2018-09-10 RX ADMIN — POTASSIUM CHLORIDE 40 MEQ: 1500 TABLET, EXTENDED RELEASE ORAL at 12:54

## 2018-09-10 RX ADMIN — ASPIRIN 81 MG: 81 TABLET, DELAYED RELEASE ORAL at 05:06

## 2018-09-10 RX ADMIN — PRAVASTATIN SODIUM 20 MG: 20 TABLET ORAL at 18:01

## 2018-09-10 RX ADMIN — POTASSIUM CHLORIDE 40 MEQ: 1500 TABLET, EXTENDED RELEASE ORAL at 18:01

## 2018-09-10 RX ADMIN — ACETAMINOPHEN 650 MG: 325 TABLET, FILM COATED ORAL at 08:26

## 2018-09-10 RX ADMIN — LISINOPRIL 5 MG: 5 TABLET ORAL at 05:06

## 2018-09-10 RX ADMIN — METOPROLOL TARTRATE 50 MG: 50 TABLET ORAL at 18:01

## 2018-09-10 RX ADMIN — RIVAROXABAN 20 MG: 20 TABLET, FILM COATED ORAL at 18:01

## 2018-09-10 RX ADMIN — METOPROLOL TARTRATE 25 MG: 25 TABLET, FILM COATED ORAL at 05:05

## 2018-09-10 RX ADMIN — MORPHINE SULFATE 4 MG: 10 INJECTION INTRAVENOUS at 09:10

## 2018-09-10 RX ADMIN — FUROSEMIDE 40 MG: 10 INJECTION, SOLUTION INTRAMUSCULAR; INTRAVENOUS at 18:01

## 2018-09-10 RX ADMIN — FUROSEMIDE 20 MG: 10 INJECTION, SOLUTION INTRAMUSCULAR; INTRAVENOUS at 05:06

## 2018-09-10 ASSESSMENT — PAIN SCALES - GENERAL
PAINLEVEL_OUTOF10: 6
PAINLEVEL_OUTOF10: 0
PAINLEVEL_OUTOF10: 0
PAINLEVEL_OUTOF10: 6
PAINLEVEL_OUTOF10: 6
PAINLEVEL_OUTOF10: 3
PAINLEVEL_OUTOF10: 0

## 2018-09-10 ASSESSMENT — ENCOUNTER SYMPTOMS
DEPRESSION: 0
BLURRED VISION: 0
SHORTNESS OF BREATH: 0
MYALGIAS: 0
DIZZINESS: 0
HEARTBURN: 0
FEVER: 0
COUGH: 0

## 2018-09-10 NOTE — PROGRESS NOTES
Notified Dr. Durán of the chest pain. Orders to give Morphine 4 mg IV. Given. Will monitor for the effectiveness.

## 2018-09-10 NOTE — PROGRESS NOTES
Received bedside report from PM nurse. Assumed patient care. Chart reviewed. Pt was resting in bed. A&O x 4.  Patient states 6/10 chest pain, similar to the pain when he arrived to ED. Asks for Tylenol. Will give. POC updated with pt and on the patient communication board. Bed locked and in the lowest position. Call light within reach. Tele box on. Will continue to monitor.

## 2018-09-10 NOTE — ASSESSMENT & PLAN NOTE
Musculoskeletal and reproducible; troponin negative so far; cardiac cath in July 2018 with reported normal coronaries;  Pain med prn and reassure patient.

## 2018-09-10 NOTE — CARE PLAN
Problem: Safety  Goal: Will remain free from falls  Fall precautions in place. Bed wheels locked, bed is in the lowest position. Call light in reach. Bed alarm on and in place. Non-skid socks provided. Patient provides successful verbalization of fall and safety precautions and demonstration of use of call bell. Upper side rails are up. Hourly rounding in progress.     Problem: Pain Management  Goal: Pain level will decrease to patient's comfort goal  Pain is assessed throughout the shift and per unit protocol. Pharmacological and non-pharmacological measures to treat pain are offered to patient. Pt is medicated per MAR.

## 2018-09-10 NOTE — PROGRESS NOTES
[facility] Hospitalist Progress Note    Date of Service: 9/10/2018    Chief Complaint  53 y.o. male admitted 2018 with syncope/chest pain and found to have afib with 's.    Interval Problem Update  Feels better; no recurrent syncope here; still has chest pain worse with palpation; denies sob.  Also c/o difficulty voiding last few days, no dysuria.    Consultants/Specialty  none    Disposition  Home when stable        Review of Systems   Constitutional: Negative for fever.   HENT: Negative for hearing loss.    Eyes: Negative for blurred vision.   Respiratory: Negative for cough and shortness of breath.    Cardiovascular: Positive for chest pain and leg swelling.   Gastrointestinal: Negative for heartburn.   Genitourinary: Negative for dysuria.   Musculoskeletal: Negative for myalgias.   Skin: Negative for rash.   Neurological: Negative for dizziness.   Psychiatric/Behavioral: Negative for depression.      Physical Exam  Laboratory/Imaging   Hemodynamics  Temp (24hrs), Av.9 °C (98.5 °F), Min:36.8 °C (98.2 °F), Max:37.2 °C (98.9 °F)   Temperature: 36.8 °C (98.2 °F)  Pulse  Av.6  Min: 92  Max: 136 Heart Rate (Monitored): 99  Blood Pressure: 117/81, NIBP: 153/89      Respiratory      Respiration: 16, Pulse Oximetry: 97 %        RUL Breath Sounds: Clear, RML Breath Sounds: Clear;Diminished, RLL Breath Sounds: Diminished, ELIAS Breath Sounds: Clear;Diminished, LLL Breath Sounds: Diminished    Fluids    Intake/Output Summary (Last 24 hours) at 09/10/18 1112  Last data filed at 09/10/18 0918   Gross per 24 hour   Intake              240 ml   Output              350 ml   Net             -110 ml       Nutrition  Orders Placed This Encounter   Procedures   • Diet Order Diabetic     Standing Status:   Standing     Number of Occurrences:   1     Order Specific Question:   Diet:     Answer:   Diabetic [3]     Physical Exam   Constitutional: He is oriented to person, place, and time. No distress.   HENT:   Head:  Normocephalic.   Eyes: EOM are normal.   Neck: Neck supple.   Cardiovascular:   irreg   Pulmonary/Chest: Effort normal and breath sounds normal.   Abdominal: Soft. Bowel sounds are normal. He exhibits no distension. There is no tenderness.   Musculoskeletal: He exhibits edema.   2-3+ edema BLE   Neurological: He is alert and oriented to person, place, and time.   Skin: Skin is warm.   Psychiatric: His behavior is normal.       Recent Labs      09/09/18   1526  09/10/18   0342   WBC  9.0  8.2   RBC  4.49*  4.62*   HEMOGLOBIN  12.6*  12.9*   HEMATOCRIT  38.8*  40.1*   MCV  86.4  86.8   MCH  28.1  27.9   MCHC  32.5*  32.2*   RDW  45.9  46.4   PLATELETCT  209  168   MPV  11.9  11.4     Recent Labs      09/09/18   1526  09/10/18   0342   SODIUM  138  141   POTASSIUM  3.4*  3.3*   CHLORIDE  105  105   CO2  22  27   GLUCOSE  129*  119*   BUN  28*  24*   CREATININE  0.81  0.82   CALCIUM  9.1  9.3     Recent Labs      09/09/18   1526   APTT  26.3   INR  1.09     Recent Labs      09/09/18   1526   BNPBTYPENAT  167*              Assessment/Plan     * Paroxysmal A-fib (HCC)- (present on admission)   Assessment & Plan    A. fib with RVR with syncope on presentation; rate controlled now  Increase metoprolol dose to 50 mg bid, with IV PRN meds with parameters  Monitor on telemetry closely  Target potassium above 4, magnesium above 2  On Xarelto        Nonischemic cardiomyopathy (HCC)- (present on admission)   Assessment & Plan    Echo July 2018 showed LVEF 40%; cath 7/18 reportedly with no CAD  IV lasix for peripheral edema, change to oral lasix soon  Continue lisinopril and metoprolol  Adjust medication as needed        Difficulty voiding- (present on admission)   Assessment & Plan    New onset for last few days; check post void residual.  I/O cath PRN.  Consider flomax if has urine retention.        Other chest pain- (present on admission)   Assessment & Plan    Musculoskeletal and reproducible; troponin negative so far; cardiac  cath in July 2018 with reported normal coronaries;  Pain med prn and reassure patient.        Essential hypertension- (present on admission)   Assessment & Plan    Stable  Continue outpatient meds        Type 2 diabetes mellitus, without long-term current use of insulin (HCC)- (present on admission)   Assessment & Plan    Holding metformin  Sliding scale insulin        Obesity- (present on admission)   Assessment & Plan    Diet and exercise education          Quality-Core Measures

## 2018-09-10 NOTE — ED NOTES
Med rec complete per pt at bedside  Ok per Pt to discuss medications with visitor present  Antibiotics taken in the last 30 days: none   Allergies verified and updated: yes

## 2018-09-11 LAB
ANION GAP SERPL CALC-SCNC: 8 MMOL/L (ref 0–11.9)
BASOPHILS # BLD AUTO: 0.6 % (ref 0–1.8)
BASOPHILS # BLD: 0.04 K/UL (ref 0–0.12)
BUN SERPL-MCNC: 21 MG/DL (ref 8–22)
CALCIUM SERPL-MCNC: 8.7 MG/DL (ref 8.5–10.5)
CHLORIDE SERPL-SCNC: 104 MMOL/L (ref 96–112)
CO2 SERPL-SCNC: 26 MMOL/L (ref 20–33)
CREAT SERPL-MCNC: 0.7 MG/DL (ref 0.5–1.4)
EOSINOPHIL # BLD AUTO: 0.5 K/UL (ref 0–0.51)
EOSINOPHIL NFR BLD: 7.8 % (ref 0–6.9)
ERYTHROCYTE [DISTWIDTH] IN BLOOD BY AUTOMATED COUNT: 47.1 FL (ref 35.9–50)
GLUCOSE BLD-MCNC: 112 MG/DL (ref 65–99)
GLUCOSE BLD-MCNC: 115 MG/DL (ref 65–99)
GLUCOSE BLD-MCNC: 135 MG/DL (ref 65–99)
GLUCOSE SERPL-MCNC: 112 MG/DL (ref 65–99)
HCT VFR BLD AUTO: 40 % (ref 42–52)
HGB BLD-MCNC: 12.8 G/DL (ref 14–18)
IMM GRANULOCYTES # BLD AUTO: 0.02 K/UL (ref 0–0.11)
IMM GRANULOCYTES NFR BLD AUTO: 0.3 % (ref 0–0.9)
LYMPHOCYTES # BLD AUTO: 2.03 K/UL (ref 1–4.8)
LYMPHOCYTES NFR BLD: 31.8 % (ref 22–41)
MAGNESIUM SERPL-MCNC: 1.8 MG/DL (ref 1.5–2.5)
MCH RBC QN AUTO: 28.1 PG (ref 27–33)
MCHC RBC AUTO-ENTMCNC: 32 G/DL (ref 33.7–35.3)
MCV RBC AUTO: 87.7 FL (ref 81.4–97.8)
MONOCYTES # BLD AUTO: 0.54 K/UL (ref 0–0.85)
MONOCYTES NFR BLD AUTO: 8.5 % (ref 0–13.4)
NEUTROPHILS # BLD AUTO: 3.25 K/UL (ref 1.82–7.42)
NEUTROPHILS NFR BLD: 51 % (ref 44–72)
NRBC # BLD AUTO: 0 K/UL
NRBC BLD-RTO: 0 /100 WBC
PLATELET # BLD AUTO: 180 K/UL (ref 164–446)
PMV BLD AUTO: 10.8 FL (ref 9–12.9)
POTASSIUM SERPL-SCNC: 3.7 MMOL/L (ref 3.6–5.5)
RBC # BLD AUTO: 4.56 M/UL (ref 4.7–6.1)
SODIUM SERPL-SCNC: 138 MMOL/L (ref 135–145)
TSH SERPL DL<=0.005 MIU/L-ACNC: 3.34 UIU/ML (ref 0.38–5.33)
WBC # BLD AUTO: 6.4 K/UL (ref 4.8–10.8)

## 2018-09-11 PROCEDURE — 36415 COLL VENOUS BLD VENIPUNCTURE: CPT

## 2018-09-11 PROCEDURE — 700111 HCHG RX REV CODE 636 W/ 250 OVERRIDE (IP): Performed by: INTERNAL MEDICINE

## 2018-09-11 PROCEDURE — 82962 GLUCOSE BLOOD TEST: CPT

## 2018-09-11 PROCEDURE — A9270 NON-COVERED ITEM OR SERVICE: HCPCS | Performed by: INTERNAL MEDICINE

## 2018-09-11 PROCEDURE — 700102 HCHG RX REV CODE 250 W/ 637 OVERRIDE(OP): Performed by: INTERNAL MEDICINE

## 2018-09-11 PROCEDURE — 83735 ASSAY OF MAGNESIUM: CPT

## 2018-09-11 PROCEDURE — 99232 SBSQ HOSP IP/OBS MODERATE 35: CPT | Performed by: INTERNAL MEDICINE

## 2018-09-11 PROCEDURE — 84443 ASSAY THYROID STIM HORMONE: CPT

## 2018-09-11 PROCEDURE — 80048 BASIC METABOLIC PNL TOTAL CA: CPT

## 2018-09-11 PROCEDURE — 85025 COMPLETE CBC W/AUTO DIFF WBC: CPT

## 2018-09-11 PROCEDURE — 51798 US URINE CAPACITY MEASURE: CPT

## 2018-09-11 PROCEDURE — G0378 HOSPITAL OBSERVATION PER HR: HCPCS

## 2018-09-11 RX ORDER — TAMSULOSIN HYDROCHLORIDE 0.4 MG/1
0.4 CAPSULE ORAL
Status: DISCONTINUED | OUTPATIENT
Start: 2018-09-11 | End: 2018-09-15 | Stop reason: HOSPADM

## 2018-09-11 RX ADMIN — FUROSEMIDE 40 MG: 10 INJECTION, SOLUTION INTRAMUSCULAR; INTRAVENOUS at 05:00

## 2018-09-11 RX ADMIN — RIVAROXABAN 20 MG: 20 TABLET, FILM COATED ORAL at 17:41

## 2018-09-11 RX ADMIN — LISINOPRIL 5 MG: 5 TABLET ORAL at 05:00

## 2018-09-11 RX ADMIN — MORPHINE SULFATE 4 MG: 10 INJECTION INTRAVENOUS at 02:48

## 2018-09-11 RX ADMIN — METOPROLOL TARTRATE 50 MG: 50 TABLET ORAL at 04:59

## 2018-09-11 RX ADMIN — PRAVASTATIN SODIUM 20 MG: 20 TABLET ORAL at 17:41

## 2018-09-11 RX ADMIN — POTASSIUM CHLORIDE 40 MEQ: 1500 TABLET, EXTENDED RELEASE ORAL at 17:41

## 2018-09-11 RX ADMIN — METOPROLOL TARTRATE 50 MG: 50 TABLET ORAL at 17:41

## 2018-09-11 RX ADMIN — POTASSIUM CHLORIDE 40 MEQ: 1500 TABLET, EXTENDED RELEASE ORAL at 05:00

## 2018-09-11 RX ADMIN — FUROSEMIDE 40 MG: 10 INJECTION, SOLUTION INTRAMUSCULAR; INTRAVENOUS at 17:41

## 2018-09-11 RX ADMIN — TAMSULOSIN HYDROCHLORIDE 0.4 MG: 0.4 CAPSULE ORAL at 10:03

## 2018-09-11 RX ADMIN — ASPIRIN 81 MG: 81 TABLET, DELAYED RELEASE ORAL at 05:00

## 2018-09-11 ASSESSMENT — COGNITIVE AND FUNCTIONAL STATUS - GENERAL
SUGGESTED CMS G CODE MODIFIER MOBILITY: CH
SUGGESTED CMS G CODE MODIFIER DAILY ACTIVITY: CH
DAILY ACTIVITIY SCORE: 24
MOBILITY SCORE: 24

## 2018-09-11 ASSESSMENT — PAIN SCALES - GENERAL
PAINLEVEL_OUTOF10: 0
PAINLEVEL_OUTOF10: 0
PAINLEVEL_OUTOF10: 3
PAINLEVEL_OUTOF10: 0
PAINLEVEL_OUTOF10: 0

## 2018-09-11 ASSESSMENT — ENCOUNTER SYMPTOMS
DEPRESSION: 0
MYALGIAS: 0
SHORTNESS OF BREATH: 0
DOUBLE VISION: 0
COUGH: 0
BLURRED VISION: 0
HEARTBURN: 0
FEVER: 0
DIZZINESS: 0
NECK PAIN: 0
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
## 2018-09-11 NOTE — PROGRESS NOTES
Received report from Julia at change of shift. Pt sitting up in bed. No complaints of pain or sob. Call light within reach.

## 2018-09-11 NOTE — PROGRESS NOTES
[facility] Hospitalist Progress Note    Date of Service: 2018    Chief Complaint  53 y.o. male admitted 2018 with syncope/chest pain and found to have afib with 's.    Interval Problem Update  Pt seen and examined, afebrile, no overnight events  no recurrent syncope here; still has chest pain worse with palpation; denies sob. Was having some difficulty voiding, had to do do straight cath last night due to him retaining around 1000 cc. Will continue with ladder scan, if continues to retain will place a frye.     Consultants/Specialty  none    Disposition  Home when stable        Review of Systems   Constitutional: Negative for fever.   HENT: Negative for congestion and hearing loss.    Eyes: Negative for blurred vision and double vision.   Respiratory: Negative for cough and shortness of breath.    Cardiovascular: Positive for chest pain and leg swelling.   Gastrointestinal: Negative for heartburn.   Genitourinary: Negative for dysuria.   Musculoskeletal: Negative for myalgias and neck pain.   Skin: Negative for rash.   Neurological: Negative for dizziness.   Psychiatric/Behavioral: Negative for depression.      Physical Exam  Laboratory/Imaging   Hemodynamics  Temp (24hrs), Av.7 °C (98.1 °F), Min:36.5 °C (97.7 °F), Max:37.1 °C (98.7 °F)   Temperature: 37.1 °C (98.7 °F)  Pulse  Av.1  Min: 69  Max: 136    Blood Pressure: (!) 96/67 (RN notified)      Respiratory      Respiration: 18, Pulse Oximetry: 94 %             Fluids    Intake/Output Summary (Last 24 hours) at 18 1409  Last data filed at 18 0930   Gross per 24 hour   Intake              240 ml   Output             2555 ml   Net            -2315 ml       Nutrition  Orders Placed This Encounter   Procedures   • Diet Order Diabetic     Standing Status:   Standing     Number of Occurrences:   1     Order Specific Question:   Diet:     Answer:   Diabetic [3]     Physical Exam   Constitutional: He is oriented to person, place, and  time. No distress.   HENT:   Head: Normocephalic.   Eyes: EOM are normal.   Neck: Neck supple.   Cardiovascular: Intact distal pulses.  Exam reveals no gallop.    irreg   Pulmonary/Chest: Effort normal and breath sounds normal.   Abdominal: Soft. Bowel sounds are normal. He exhibits no distension. There is no tenderness.   Musculoskeletal: He exhibits edema.   2-3+ edema BLE   Neurological: He is alert and oriented to person, place, and time.   Skin: Skin is warm.   Psychiatric: His behavior is normal.   Nursing note and vitals reviewed.      Recent Labs      09/09/18   1526  09/10/18   0342  09/11/18   0225   WBC  9.0  8.2  6.4   RBC  4.49*  4.62*  4.56*   HEMOGLOBIN  12.6*  12.9*  12.8*   HEMATOCRIT  38.8*  40.1*  40.0*   MCV  86.4  86.8  87.7   MCH  28.1  27.9  28.1   MCHC  32.5*  32.2*  32.0*   RDW  45.9  46.4  47.1   PLATELETCT  209  168  180   MPV  11.9  11.4  10.8     Recent Labs      09/09/18   1526  09/10/18   0342  09/11/18   0225   SODIUM  138  141  138   POTASSIUM  3.4*  3.3*  3.7   CHLORIDE  105  105  104   CO2  22  27  26   GLUCOSE  129*  119*  112*   BUN  28*  24*  21   CREATININE  0.81  0.82  0.70   CALCIUM  9.1  9.3  8.7     Recent Labs      09/09/18   1526   APTT  26.3   INR  1.09     Recent Labs      09/09/18   1526   BNPBTYPENAT  167*              Assessment/Plan     * Paroxysmal A-fib (HCC)- (present on admission)   Assessment & Plan    A. fib with RVR with syncope on presentation; rate controlled now  Now on  metoprolol dose to 50 mg bid, with IV PRN meds with parameters  Target potassium above 4, magnesium above 2  On Xarelto        Nonischemic cardiomyopathy (HCC)- (present on admission)   Assessment & Plan    Echo July 2018 showed LVEF 40%; cath 7/18 reportedly with no CAD  IV lasix for peripheral edema, change to oral lasix soon  Continue lisinopril and metoprolol  Adjust medication as needed        Difficulty voiding- (present on admission)   Assessment & Plan    New onset for last few  days; check post void residual.  I/O cath PRN.   Starting on tamsulosin         Other chest pain- (present on admission)   Assessment & Plan    Musculoskeletal and reproducible; troponin negative so far; cardiac cath in July 2018 with reported normal coronaries;  Pain med prn and reassure patient.        Essential hypertension- (present on admission)   Assessment & Plan    Stable  Continue outpatient meds        Type 2 diabetes mellitus, without long-term current use of insulin (HCC)- (present on admission)   Assessment & Plan    Holding metformin  Sliding scale insulin        Obesity- (present on admission)   Assessment & Plan    Diet and exercise education          Quality-Core Measures   Reviewed items::  Labs reviewed, Radiology images reviewed and Medications reviewed  DVT: xarelto

## 2018-09-11 NOTE — PROGRESS NOTES
"Received bedside report from PM nurse. Assumed patient care. Chart reviewed. Pt was resting at edge of bed. A&O x 4. No concerns, complaints or distress. Patient states chest pain 3/10. Asks to NOT have Morphine today AS \"it was too much.\" POC updated with pt and on the patient communication board. Bed locked and in the lowest position. Call light within reach. Tele box on. Will continue to monitor.   "

## 2018-09-11 NOTE — PROGRESS NOTES
Started having chest pain around 2030. Was a 3 then went to a 6. Stated it is the same chest pain he has been having. Asked to take tramadol. Tramadol given at 2033. Pt has been sleeping since. Will monitor.

## 2018-09-11 NOTE — CARE PLAN
Problem: Safety  Goal: Will remain free from falls  Fall precautions in place. Bed wheels locked, bed is in the lowest position. Call light in reach. Non-skid socks provided. Patient provides successful verbalization of fall and safety precautions and demonstration of use of call bell. Upper side rails are up. Hourly rounding in progress.     Problem: Knowledge Deficit  Goal: Knowledge of disease process/condition, treatment plan, diagnostic tests, and medications will improve  POC discussed with the patient. All questions and concerns addressed and answered. Patient is involved in POC and verbalizes the understanding of the disease process, medications, tests and treatments. Questions on POC encouraged throughout care.

## 2018-09-12 LAB
ANION GAP SERPL CALC-SCNC: 6 MMOL/L (ref 0–11.9)
BUN SERPL-MCNC: 25 MG/DL (ref 8–22)
CALCIUM SERPL-MCNC: 9.3 MG/DL (ref 8.5–10.5)
CHLORIDE SERPL-SCNC: 100 MMOL/L (ref 96–112)
CO2 SERPL-SCNC: 30 MMOL/L (ref 20–33)
CREAT SERPL-MCNC: 0.89 MG/DL (ref 0.5–1.4)
ERYTHROCYTE [DISTWIDTH] IN BLOOD BY AUTOMATED COUNT: 46.5 FL (ref 35.9–50)
GLUCOSE BLD-MCNC: 114 MG/DL (ref 65–99)
GLUCOSE BLD-MCNC: 126 MG/DL (ref 65–99)
GLUCOSE BLD-MCNC: 144 MG/DL (ref 65–99)
GLUCOSE BLD-MCNC: 145 MG/DL (ref 65–99)
GLUCOSE SERPL-MCNC: 110 MG/DL (ref 65–99)
HCT VFR BLD AUTO: 43.7 % (ref 42–52)
HGB BLD-MCNC: 14.4 G/DL (ref 14–18)
MCH RBC QN AUTO: 28.6 PG (ref 27–33)
MCHC RBC AUTO-ENTMCNC: 33 G/DL (ref 33.7–35.3)
MCV RBC AUTO: 86.7 FL (ref 81.4–97.8)
PLATELET # BLD AUTO: 187 K/UL (ref 164–446)
PMV BLD AUTO: 11.4 FL (ref 9–12.9)
POTASSIUM SERPL-SCNC: 4.1 MMOL/L (ref 3.6–5.5)
RBC # BLD AUTO: 5.04 M/UL (ref 4.7–6.1)
SODIUM SERPL-SCNC: 136 MMOL/L (ref 135–145)
WBC # BLD AUTO: 7 K/UL (ref 4.8–10.8)

## 2018-09-12 PROCEDURE — 82962 GLUCOSE BLOOD TEST: CPT | Mod: 91

## 2018-09-12 PROCEDURE — 80048 BASIC METABOLIC PNL TOTAL CA: CPT

## 2018-09-12 PROCEDURE — 700111 HCHG RX REV CODE 636 W/ 250 OVERRIDE (IP): Performed by: INTERNAL MEDICINE

## 2018-09-12 PROCEDURE — G0378 HOSPITAL OBSERVATION PER HR: HCPCS

## 2018-09-12 PROCEDURE — 51798 US URINE CAPACITY MEASURE: CPT

## 2018-09-12 PROCEDURE — A9270 NON-COVERED ITEM OR SERVICE: HCPCS | Performed by: INTERNAL MEDICINE

## 2018-09-12 PROCEDURE — 700102 HCHG RX REV CODE 250 W/ 637 OVERRIDE(OP): Performed by: INTERNAL MEDICINE

## 2018-09-12 PROCEDURE — 99232 SBSQ HOSP IP/OBS MODERATE 35: CPT | Performed by: INTERNAL MEDICINE

## 2018-09-12 PROCEDURE — 36415 COLL VENOUS BLD VENIPUNCTURE: CPT

## 2018-09-12 PROCEDURE — 85027 COMPLETE CBC AUTOMATED: CPT

## 2018-09-12 RX ADMIN — METOPROLOL TARTRATE 50 MG: 50 TABLET ORAL at 05:32

## 2018-09-12 RX ADMIN — LISINOPRIL 5 MG: 5 TABLET ORAL at 05:31

## 2018-09-12 RX ADMIN — POTASSIUM CHLORIDE 40 MEQ: 1500 TABLET, EXTENDED RELEASE ORAL at 18:00

## 2018-09-12 RX ADMIN — TAMSULOSIN HYDROCHLORIDE 0.4 MG: 0.4 CAPSULE ORAL at 08:26

## 2018-09-12 RX ADMIN — PRAVASTATIN SODIUM 20 MG: 20 TABLET ORAL at 18:18

## 2018-09-12 RX ADMIN — ASPIRIN 81 MG: 81 TABLET, DELAYED RELEASE ORAL at 05:32

## 2018-09-12 RX ADMIN — METOPROLOL TARTRATE 50 MG: 50 TABLET ORAL at 18:17

## 2018-09-12 RX ADMIN — RIVAROXABAN 20 MG: 20 TABLET, FILM COATED ORAL at 18:18

## 2018-09-12 RX ADMIN — TRAMADOL HYDROCHLORIDE 50 MG: 50 TABLET, COATED ORAL at 03:46

## 2018-09-12 RX ADMIN — POTASSIUM CHLORIDE 40 MEQ: 1500 TABLET, EXTENDED RELEASE ORAL at 05:32

## 2018-09-12 RX ADMIN — FUROSEMIDE 40 MG: 10 INJECTION, SOLUTION INTRAMUSCULAR; INTRAVENOUS at 05:32

## 2018-09-12 RX ADMIN — FUROSEMIDE 40 MG: 10 INJECTION, SOLUTION INTRAMUSCULAR; INTRAVENOUS at 18:15

## 2018-09-12 ASSESSMENT — ENCOUNTER SYMPTOMS
NECK PAIN: 0
DIZZINESS: 0
DEPRESSION: 0
FEVER: 0
DOUBLE VISION: 0
COUGH: 0
SHORTNESS OF BREATH: 0
MYALGIAS: 0
BLURRED VISION: 0
HEARTBURN: 0

## 2018-09-12 ASSESSMENT — PAIN SCALES - GENERAL
PAINLEVEL_OUTOF10: 0
PAINLEVEL_OUTOF10: 0

## 2018-09-12 NOTE — CARE PLAN
Problem: Urinary Elimination:  Goal: Ability to reestablish a normal urinary elimination pattern will improve  Outcome: PROGRESSING SLOWER THAN EXPECTED  Pt did have retention problems overnight 9/11, improving now with bladder scans during daytime negative, will obtain additional bladder scan this morning post void to ensure problem has resolved    Problem: Cardiac:  Goal: Cardiovascular alteration will improve  Outcome: PROGRESSING AS EXPECTED  Pt verbalizes understanding of POC and importance of medication management of heart failure as well as compliance with fluid restrictions given edema in legs.

## 2018-09-12 NOTE — PROGRESS NOTES
[facility] Hospitalist Progress Note    Date of Service: 2018    Chief Complaint  53 y.o. male admitted 2018 with syncope/chest pain and found to have afib with 's.    Interval Problem Update  Pt seen and examined, afebrile, no overnight events  no recurrent syncope here Cp and SOB improving    Consultants/Specialty  none    Disposition  Home when stable        Review of Systems   Constitutional: Negative for fever.   HENT: Negative for congestion and hearing loss.    Eyes: Negative for blurred vision and double vision.   Respiratory: Negative for cough and shortness of breath.    Cardiovascular: Positive for chest pain and leg swelling.   Gastrointestinal: Negative for heartburn.   Genitourinary: Negative for dysuria.   Musculoskeletal: Negative for myalgias and neck pain.   Skin: Negative for rash.   Neurological: Negative for dizziness.   Psychiatric/Behavioral: Negative for depression.      Physical Exam  Laboratory/Imaging   Hemodynamics  Temp (24hrs), Av.7 °C (98.1 °F), Min:36.1 °C (97 °F), Max:37.1 °C (98.7 °F)   Temperature: 36.8 °C (98.3 °F)  Pulse  Av.5  Min: 68  Max: 136    Blood Pressure: 123/78      Respiratory      Respiration: 18, Pulse Oximetry: 93 %        RUL Breath Sounds: Clear, RML Breath Sounds: Clear;Diminished, RLL Breath Sounds: Diminished, ELIAS Breath Sounds: Clear;Diminished, LLL Breath Sounds: Diminished    Fluids    Intake/Output Summary (Last 24 hours) at 18 1125  Last data filed at 18 0831   Gross per 24 hour   Intake             1420 ml   Output             1650 ml   Net             -230 ml       Nutrition  Orders Placed This Encounter   Procedures   • Diet Order Diabetic     Standing Status:   Standing     Number of Occurrences:   1     Order Specific Question:   Diet:     Answer:   Diabetic [3]     Physical Exam   Constitutional: He is oriented to person, place, and time. No distress.   HENT:   Head: Normocephalic.   Eyes: EOM are normal.   Neck: Neck  supple.   Cardiovascular: Intact distal pulses.  Exam reveals no gallop.    irreg   Pulmonary/Chest: Effort normal and breath sounds normal.   Abdominal: Soft. Bowel sounds are normal. He exhibits no distension. There is no tenderness.   Musculoskeletal: He exhibits edema.   2-3+ edema BLE   Neurological: He is alert and oriented to person, place, and time.   Skin: Skin is warm.   Psychiatric: His behavior is normal.   Nursing note and vitals reviewed.      Recent Labs      09/10/18   0342  09/11/18   0225  09/12/18   0442   WBC  8.2  6.4  7.0   RBC  4.62*  4.56*  5.04   HEMOGLOBIN  12.9*  12.8*  14.4   HEMATOCRIT  40.1*  40.0*  43.7   MCV  86.8  87.7  86.7   MCH  27.9  28.1  28.6   MCHC  32.2*  32.0*  33.0*   RDW  46.4  47.1  46.5   PLATELETCT  168  180  187   MPV  11.4  10.8  11.4     Recent Labs      09/10/18   0342  09/11/18 0225  09/12/18   0442   SODIUM  141  138  136   POTASSIUM  3.3*  3.7  4.1   CHLORIDE  105  104  100   CO2  27  26  30   GLUCOSE  119*  112*  110*   BUN  24*  21  25*   CREATININE  0.82  0.70  0.89   CALCIUM  9.3  8.7  9.3     Recent Labs      09/09/18   1526   APTT  26.3   INR  1.09     Recent Labs      09/09/18   1526   BNPBTYPENAT  167*              Assessment/Plan     * Paroxysmal A-fib (HCC)- (present on admission)   Assessment & Plan    A. fib with RVR with syncope on presentation; rate controlled now  Now on  metoprolol dose to 50 mg bid, with IV PRN meds with parameters  Target potassium above 4, magnesium above 2  On Xarelto        Nonischemic cardiomyopathy (HCC)- (present on admission)   Assessment & Plan    Echo July 2018 showed LVEF 40%; cath 7/18 reportedly with no CAD  IV lasix for peripheral edema, change to oral lasix on discharge   Continue lisinopril and metoprolol  Adjust medication as needed        Difficulty voiding- (present on admission)   Assessment & Plan    New onset for last few days;   Now on tamsulosin and not retaining any more  Will monitor         Other  chest pain- (present on admission)   Assessment & Plan    Musculoskeletal and reproducible; troponin negative so far; cardiac cath in July 2018 with reported normal coronaries;  Pain med prn and reassure patient.        Essential hypertension- (present on admission)   Assessment & Plan    Stable  Continue outpatient meds        Type 2 diabetes mellitus, without long-term current use of insulin (HCC)- (present on admission)   Assessment & Plan    Holding metformin  Sliding scale insulin        Obesity- (present on admission)   Assessment & Plan    Diet and exercise education          Quality-Core Measures   Reviewed items::  Labs reviewed, Radiology images reviewed and Medications reviewed  DVT: xarelto

## 2018-09-12 NOTE — PROGRESS NOTES
Bedside report with ELISA Dumont, pt is alert and oriented on room air, voiding in urinal, up independently. No questions at this time. Bed low and locked, call bell within reach.

## 2018-09-13 LAB
ANION GAP SERPL CALC-SCNC: 10 MMOL/L (ref 0–11.9)
BUN SERPL-MCNC: 34 MG/DL (ref 8–22)
CALCIUM SERPL-MCNC: 9.8 MG/DL (ref 8.5–10.5)
CHLORIDE SERPL-SCNC: 97 MMOL/L (ref 96–112)
CO2 SERPL-SCNC: 30 MMOL/L (ref 20–33)
CREAT SERPL-MCNC: 1.12 MG/DL (ref 0.5–1.4)
EKG IMPRESSION: NORMAL
ERYTHROCYTE [DISTWIDTH] IN BLOOD BY AUTOMATED COUNT: 46.8 FL (ref 35.9–50)
GLUCOSE BLD-MCNC: 129 MG/DL (ref 65–99)
GLUCOSE BLD-MCNC: 134 MG/DL (ref 65–99)
GLUCOSE BLD-MCNC: 137 MG/DL (ref 65–99)
GLUCOSE SERPL-MCNC: 128 MG/DL (ref 65–99)
HCT VFR BLD AUTO: 46.6 % (ref 42–52)
HGB BLD-MCNC: 15.2 G/DL (ref 14–18)
MCH RBC QN AUTO: 28.4 PG (ref 27–33)
MCHC RBC AUTO-ENTMCNC: 32.6 G/DL (ref 33.7–35.3)
MCV RBC AUTO: 86.9 FL (ref 81.4–97.8)
PLATELET # BLD AUTO: 215 K/UL (ref 164–446)
PMV BLD AUTO: 11.2 FL (ref 9–12.9)
POTASSIUM SERPL-SCNC: 4.2 MMOL/L (ref 3.6–5.5)
RBC # BLD AUTO: 5.36 M/UL (ref 4.7–6.1)
SODIUM SERPL-SCNC: 137 MMOL/L (ref 135–145)
WBC # BLD AUTO: 7.7 K/UL (ref 4.8–10.8)

## 2018-09-13 PROCEDURE — 700102 HCHG RX REV CODE 250 W/ 637 OVERRIDE(OP): Performed by: INTERNAL MEDICINE

## 2018-09-13 PROCEDURE — A9270 NON-COVERED ITEM OR SERVICE: HCPCS | Performed by: INTERNAL MEDICINE

## 2018-09-13 PROCEDURE — 85027 COMPLETE CBC AUTOMATED: CPT

## 2018-09-13 PROCEDURE — 700111 HCHG RX REV CODE 636 W/ 250 OVERRIDE (IP): Performed by: INTERNAL MEDICINE

## 2018-09-13 PROCEDURE — 80048 BASIC METABOLIC PNL TOTAL CA: CPT

## 2018-09-13 PROCEDURE — 36415 COLL VENOUS BLD VENIPUNCTURE: CPT

## 2018-09-13 PROCEDURE — 93010 ELECTROCARDIOGRAM REPORT: CPT | Mod: 76 | Performed by: INTERNAL MEDICINE

## 2018-09-13 PROCEDURE — 93005 ELECTROCARDIOGRAM TRACING: CPT | Performed by: INTERNAL MEDICINE

## 2018-09-13 PROCEDURE — 99232 SBSQ HOSP IP/OBS MODERATE 35: CPT | Performed by: INTERNAL MEDICINE

## 2018-09-13 PROCEDURE — 82962 GLUCOSE BLOOD TEST: CPT

## 2018-09-13 PROCEDURE — G0378 HOSPITAL OBSERVATION PER HR: HCPCS

## 2018-09-13 RX ORDER — ONDANSETRON 2 MG/ML
4 INJECTION INTRAMUSCULAR; INTRAVENOUS ONCE
Status: COMPLETED | OUTPATIENT
Start: 2018-09-13 | End: 2018-09-13

## 2018-09-13 RX ORDER — FUROSEMIDE 40 MG/1
40 TABLET ORAL
Status: DISCONTINUED | OUTPATIENT
Start: 2018-09-13 | End: 2018-09-14

## 2018-09-13 RX ADMIN — RIVAROXABAN 20 MG: 20 TABLET, FILM COATED ORAL at 17:37

## 2018-09-13 RX ADMIN — PRAVASTATIN SODIUM 20 MG: 20 TABLET ORAL at 17:38

## 2018-09-13 RX ADMIN — POTASSIUM CHLORIDE 40 MEQ: 1500 TABLET, EXTENDED RELEASE ORAL at 05:07

## 2018-09-13 RX ADMIN — METOPROLOL TARTRATE 50 MG: 50 TABLET ORAL at 05:07

## 2018-09-13 RX ADMIN — LISINOPRIL 5 MG: 5 TABLET ORAL at 05:07

## 2018-09-13 RX ADMIN — TAMSULOSIN HYDROCHLORIDE 0.4 MG: 0.4 CAPSULE ORAL at 09:36

## 2018-09-13 RX ADMIN — ONDANSETRON 4 MG: 2 INJECTION INTRAMUSCULAR; INTRAVENOUS at 15:12

## 2018-09-13 RX ADMIN — POTASSIUM CHLORIDE 40 MEQ: 1500 TABLET, EXTENDED RELEASE ORAL at 17:37

## 2018-09-13 RX ADMIN — FUROSEMIDE 40 MG: 10 INJECTION, SOLUTION INTRAMUSCULAR; INTRAVENOUS at 05:07

## 2018-09-13 RX ADMIN — ASPIRIN 81 MG: 81 TABLET, DELAYED RELEASE ORAL at 05:07

## 2018-09-13 RX ADMIN — TRAMADOL HYDROCHLORIDE 50 MG: 50 TABLET, COATED ORAL at 15:13

## 2018-09-13 ASSESSMENT — PAIN SCALES - GENERAL
PAINLEVEL_OUTOF10: 0
PAINLEVEL_OUTOF10: 4
PAINLEVEL_OUTOF10: 0
PAINLEVEL_OUTOF10: 0
PAINLEVEL_OUTOF10: 6
PAINLEVEL_OUTOF10: 0

## 2018-09-13 ASSESSMENT — ENCOUNTER SYMPTOMS
DOUBLE VISION: 0
COUGH: 0
BLURRED VISION: 0
DEPRESSION: 0
FEVER: 0
HEARTBURN: 0
SHORTNESS OF BREATH: 0
NECK PAIN: 0
DIZZINESS: 0
MYALGIAS: 0

## 2018-09-13 NOTE — CARE PLAN
Problem: Knowledge Deficit  Goal: Knowledge of disease process/condition, treatment plan, diagnostic tests, and medications will improve  Outcome: PROGRESSING AS EXPECTED  Discussed plan of care for today w/ pt this am, he is A+O, he verbalizes understanding of his plan of care, no questions. Emotional support given. Will monitor for educational needs. Educated to call for staff if in need of assistance. Discussed pt's care with Hospitalist this am.       Problem: Pain Management  Goal: Pain level will decrease to patient's comfort goal  Outcome: PROGRESSING AS EXPECTED  Assessing every four hrs for pain per protocol; see doc flowsheets, denies need for pain medication this am.

## 2018-09-13 NOTE — PROGRESS NOTES
Bedside note with RN Jeison, pt is alert and oriented on room air, up independently, voiding in urinal for accurate Is and Os. Retention problem resolved, no issues at this time, no c/o chest pain, has tramadol available PRN. Will continue to monitor.

## 2018-09-13 NOTE — PROGRESS NOTES
[facility] Hospitalist Progress Note    Date of Service: 2018    Chief Complaint  53 y.o. male admitted 2018 with syncope/chest pain and found to have afib with 's.    Interval Problem Update  Afebrile, no overnight events  no recurrent syncope here CP and SOB improving    Consultants/Specialty  none    Disposition  Home when stable        Review of Systems   Constitutional: Negative for fever.   HENT: Negative for congestion and hearing loss.    Eyes: Negative for blurred vision and double vision.   Respiratory: Negative for cough and shortness of breath.    Cardiovascular: Positive for chest pain and leg swelling.   Gastrointestinal: Negative for heartburn.   Genitourinary: Negative for dysuria.   Musculoskeletal: Negative for myalgias and neck pain.   Skin: Negative for rash.   Neurological: Negative for dizziness.   Psychiatric/Behavioral: Negative for depression.      Physical Exam  Laboratory/Imaging   Hemodynamics  Temp (24hrs), Av.4 °C (97.5 °F), Min:36.1 °C (96.9 °F), Max:36.8 °C (98.3 °F)   Temperature: 36.4 °C (97.6 °F)  Pulse  Av.2  Min: 68  Max: 136    Blood Pressure: (!) 97/63      Respiratory      Respiration: 16, Pulse Oximetry: 94 %        RUL Breath Sounds: Clear, RML Breath Sounds: Clear;Diminished, RLL Breath Sounds: Diminished, ELIAS Breath Sounds: Clear;Diminished, LLL Breath Sounds: Diminished    Fluids    Intake/Output Summary (Last 24 hours) at 18 1133  Last data filed at 18 0952   Gross per 24 hour   Intake              320 ml   Output             2150 ml   Net            -1830 ml       Nutrition  Orders Placed This Encounter   Procedures   • Diet Order Diabetic     Standing Status:   Standing     Number of Occurrences:   1     Order Specific Question:   Diet:     Answer:   Diabetic [3]     Physical Exam   Constitutional: He is oriented to person, place, and time. No distress.   HENT:   Head: Normocephalic.   Eyes: EOM are normal.   Neck: Neck supple.    Cardiovascular: Intact distal pulses.  Exam reveals no gallop.    irreg   Pulmonary/Chest: Effort normal and breath sounds normal.   Abdominal: Soft. Bowel sounds are normal. He exhibits no distension. There is no tenderness.   Musculoskeletal: He exhibits edema.   2-3+ edema BLE   Neurological: He is alert and oriented to person, place, and time.   Skin: Skin is warm.   Psychiatric: His behavior is normal.   Nursing note and vitals reviewed.      Recent Labs      09/11/18 0225 09/12/18 0442 09/13/18   0101   WBC  6.4  7.0  7.7   RBC  4.56*  5.04  5.36   HEMOGLOBIN  12.8*  14.4  15.2   HEMATOCRIT  40.0*  43.7  46.6   MCV  87.7  86.7  86.9   MCH  28.1  28.6  28.4   MCHC  32.0*  33.0*  32.6*   RDW  47.1  46.5  46.8   PLATELETCT  180  187  215   MPV  10.8  11.4  11.2     Recent Labs      09/11/18 0225 09/12/18 0442 09/13/18   0101   SODIUM  138  136  137   POTASSIUM  3.7  4.1  4.2   CHLORIDE  104  100  97   CO2  26  30  30   GLUCOSE  112*  110*  128*   BUN  21  25*  34*   CREATININE  0.70  0.89  1.12   CALCIUM  8.7  9.3  9.8                      Assessment/Plan     * Paroxysmal A-fib (HCC)- (present on admission)   Assessment & Plan    A. fib with RVR with syncope on presentation; rate controlled now  Now on  metoprolol dose to 50 mg bid, with IV PRN meds with parameters  Target potassium above 4, magnesium above 2  On Xarelto        Nonischemic cardiomyopathy (HCC)- (present on admission)   Assessment & Plan    Echo July 2018 showed LVEF 40%; cath 7/18 reportedly with no CAD  Changed to oral lasix   Continue lisinopril and metoprolol  Adjust medication as needed        Difficulty voiding- (present on admission)   Assessment & Plan    New onset for last few days;   Now on tamsulosin and not retaining any more  Will monitor         Other chest pain- (present on admission)   Assessment & Plan    Musculoskeletal and reproducible; troponin negative so far; cardiac cath in July 2018 with reported normal  coronaries;  Pain med prn and reassure patient.        Essential hypertension- (present on admission)   Assessment & Plan    Stable  Continue outpatient meds        Type 2 diabetes mellitus, without long-term current use of insulin (HCC)- (present on admission)   Assessment & Plan    Holding metformin  Sliding scale insulin        Obesity- (present on admission)   Assessment & Plan    Diet and exercise education          Quality-Core Measures   Reviewed items::  Labs reviewed, Radiology images reviewed and Medications reviewed  DVT: xarelto

## 2018-09-14 LAB
ANION GAP SERPL CALC-SCNC: 8 MMOL/L (ref 0–11.9)
BUN SERPL-MCNC: 37 MG/DL (ref 8–22)
CALCIUM SERPL-MCNC: 9.9 MG/DL (ref 8.5–10.5)
CHLORIDE SERPL-SCNC: 97 MMOL/L (ref 96–112)
CO2 SERPL-SCNC: 31 MMOL/L (ref 20–33)
CREAT SERPL-MCNC: 0.98 MG/DL (ref 0.5–1.4)
GLUCOSE BLD-MCNC: 106 MG/DL (ref 65–99)
GLUCOSE BLD-MCNC: 113 MG/DL (ref 65–99)
GLUCOSE BLD-MCNC: 153 MG/DL (ref 65–99)
GLUCOSE BLD-MCNC: 169 MG/DL (ref 65–99)
GLUCOSE SERPL-MCNC: 134 MG/DL (ref 65–99)
POTASSIUM SERPL-SCNC: 4.7 MMOL/L (ref 3.6–5.5)
SODIUM SERPL-SCNC: 136 MMOL/L (ref 135–145)

## 2018-09-14 PROCEDURE — 700102 HCHG RX REV CODE 250 W/ 637 OVERRIDE(OP): Performed by: INTERNAL MEDICINE

## 2018-09-14 PROCEDURE — G0378 HOSPITAL OBSERVATION PER HR: HCPCS

## 2018-09-14 PROCEDURE — A9270 NON-COVERED ITEM OR SERVICE: HCPCS | Performed by: INTERNAL MEDICINE

## 2018-09-14 PROCEDURE — 99232 SBSQ HOSP IP/OBS MODERATE 35: CPT | Performed by: INTERNAL MEDICINE

## 2018-09-14 PROCEDURE — 80048 BASIC METABOLIC PNL TOTAL CA: CPT

## 2018-09-14 PROCEDURE — 36415 COLL VENOUS BLD VENIPUNCTURE: CPT

## 2018-09-14 PROCEDURE — 82962 GLUCOSE BLOOD TEST: CPT

## 2018-09-14 RX ORDER — METOPROLOL TARTRATE 50 MG/1
50 TABLET, FILM COATED ORAL 2 TIMES DAILY
Qty: 60 TAB | Refills: 0 | Status: SHIPPED | OUTPATIENT
Start: 2018-09-14 | End: 2018-09-28 | Stop reason: CLARIF

## 2018-09-14 RX ORDER — FUROSEMIDE 40 MG/1
40 TABLET ORAL
Status: DISCONTINUED | OUTPATIENT
Start: 2018-09-15 | End: 2018-09-15 | Stop reason: HOSPADM

## 2018-09-14 RX ORDER — FUROSEMIDE 40 MG/1
40 TABLET ORAL DAILY
Qty: 30 TAB | Refills: 0 | Status: SHIPPED | OUTPATIENT
Start: 2018-09-15 | End: 2018-09-28 | Stop reason: CLARIF

## 2018-09-14 RX ORDER — PRAVASTATIN SODIUM 20 MG
20 TABLET ORAL EVERY EVENING
Qty: 30 TAB | Refills: 11 | Status: SHIPPED | OUTPATIENT
Start: 2018-09-14 | End: 2018-09-28 | Stop reason: CLARIF

## 2018-09-14 RX ORDER — LISINOPRIL 5 MG/1
5 TABLET ORAL DAILY
Qty: 30 TAB | Refills: 0 | Status: SHIPPED | OUTPATIENT
Start: 2018-09-14 | End: 2018-09-28

## 2018-09-14 RX ORDER — ASPIRIN 81 MG/1
81 TABLET ORAL DAILY
Qty: 30 TAB | Refills: 3 | Status: SHIPPED | OUTPATIENT
Start: 2018-09-14 | End: 2018-09-28 | Stop reason: CLARIF

## 2018-09-14 RX ADMIN — POTASSIUM CHLORIDE 40 MEQ: 1500 TABLET, EXTENDED RELEASE ORAL at 05:35

## 2018-09-14 RX ADMIN — INSULIN HUMAN 1 UNITS: 100 INJECTION, SOLUTION PARENTERAL at 12:35

## 2018-09-14 RX ADMIN — TRAMADOL HYDROCHLORIDE 50 MG: 50 TABLET, COATED ORAL at 07:15

## 2018-09-14 RX ADMIN — POTASSIUM CHLORIDE 40 MEQ: 1500 TABLET, EXTENDED RELEASE ORAL at 16:51

## 2018-09-14 RX ADMIN — METOPROLOL TARTRATE 25 MG: 25 TABLET, FILM COATED ORAL at 16:45

## 2018-09-14 RX ADMIN — INSULIN HUMAN 1 UNITS: 100 INJECTION, SOLUTION PARENTERAL at 21:00

## 2018-09-14 RX ADMIN — RIVAROXABAN 20 MG: 20 TABLET, FILM COATED ORAL at 16:46

## 2018-09-14 RX ADMIN — TAMSULOSIN HYDROCHLORIDE 0.4 MG: 0.4 CAPSULE ORAL at 10:08

## 2018-09-14 RX ADMIN — FUROSEMIDE 40 MG: 40 TABLET ORAL at 05:35

## 2018-09-14 RX ADMIN — ASPIRIN 81 MG: 81 TABLET, DELAYED RELEASE ORAL at 05:35

## 2018-09-14 RX ADMIN — PRAVASTATIN SODIUM 20 MG: 20 TABLET ORAL at 16:45

## 2018-09-14 RX ADMIN — METOPROLOL TARTRATE 50 MG: 50 TABLET ORAL at 05:35

## 2018-09-14 ASSESSMENT — ENCOUNTER SYMPTOMS
COUGH: 0
DIZZINESS: 0
FEVER: 0
NECK PAIN: 0
HEARTBURN: 0
DOUBLE VISION: 0
MYALGIAS: 0
BLURRED VISION: 0
DEPRESSION: 0
SHORTNESS OF BREATH: 0

## 2018-09-14 ASSESSMENT — PAIN SCALES - GENERAL
PAINLEVEL_OUTOF10: 0
PAINLEVEL_OUTOF10: 2
PAINLEVEL_OUTOF10: 0
PAINLEVEL_OUTOF10: 0
PAINLEVEL_OUTOF10: 4
PAINLEVEL_OUTOF10: 0

## 2018-09-14 ASSESSMENT — PATIENT HEALTH QUESTIONNAIRE - PHQ9
1. LITTLE INTEREST OR PLEASURE IN DOING THINGS: NOT AT ALL
SUM OF ALL RESPONSES TO PHQ9 QUESTIONS 1 AND 2: 0
2. FEELING DOWN, DEPRESSED, IRRITABLE, OR HOPELESS: NOT AT ALL

## 2018-09-14 NOTE — PROGRESS NOTES
"Pt states this am: \"I am feeling better.\" Denies need for pain medication. Rounds with Dr. Rodriguez this am. Orthostatics done this am. Discussed pt's care with Hospitalist. Informed him of orthostatics. Orders received.   "

## 2018-09-14 NOTE — PROGRESS NOTES
[facility] Hospitalist Progress Note    Date of Service: 2018    Chief Complaint  53 y.o. male admitted 2018 with syncope/chest pain and found to have afib with 's.    Interval Problem Update  Pt seen and examined, his BP is on the low side now, so will decrease his laxis to 40 mg daily.  No overnight events     Consultants/Specialty  none    Disposition  Home when stable        Review of Systems   Constitutional: Negative for fever.   HENT: Negative for congestion and hearing loss.    Eyes: Negative for blurred vision and double vision.   Respiratory: Negative for cough and shortness of breath.    Cardiovascular: Positive for chest pain and leg swelling.   Gastrointestinal: Negative for heartburn.   Genitourinary: Negative for dysuria.   Musculoskeletal: Negative for myalgias and neck pain.   Skin: Negative for rash.   Neurological: Negative for dizziness.   Psychiatric/Behavioral: Negative for depression.      Physical Exam  Laboratory/Imaging   Hemodynamics  Temp (24hrs), Av.1 °C (96.9 °F), Min:35.8 °C (96.5 °F), Max:36.3 °C (97.4 °F)   Temperature: 35.9 °C (96.7 °F)  Pulse  Av.3  Min: 45  Max: 136    Blood Pressure: (!) 94/59      Respiratory      Respiration: 12, Pulse Oximetry: 96 %        RUL Breath Sounds: Clear, RML Breath Sounds: Clear;Diminished, RLL Breath Sounds: Diminished, ELIAS Breath Sounds: Clear;Diminished, LLL Breath Sounds: Diminished    Fluids    Intake/Output Summary (Last 24 hours) at 18 1315  Last data filed at 18 0930   Gross per 24 hour   Intake             1910 ml   Output              750 ml   Net             1160 ml       Nutrition  Orders Placed This Encounter   Procedures   • Diet Order Diabetic     Standing Status:   Standing     Number of Occurrences:   1     Order Specific Question:   Diet:     Answer:   Diabetic [3]     Physical Exam   Constitutional: He is oriented to person, place, and time. No distress.   HENT:   Head: Normocephalic.   Eyes: EOM  are normal.   Neck: Neck supple.   Cardiovascular: Intact distal pulses.  Exam reveals no gallop.    irreg   Pulmonary/Chest: Effort normal and breath sounds normal.   Abdominal: Soft. Bowel sounds are normal. He exhibits no distension. There is no tenderness.   Musculoskeletal: He exhibits edema.   2-3+ edema BLE   Neurological: He is alert and oriented to person, place, and time.   Skin: Skin is warm.   Psychiatric: His behavior is normal.   Nursing note and vitals reviewed.      Recent Labs      09/12/18 0442 09/13/18   0101   WBC  7.0  7.7   RBC  5.04  5.36   HEMOGLOBIN  14.4  15.2   HEMATOCRIT  43.7  46.6   MCV  86.7  86.9   MCH  28.6  28.4   MCHC  33.0*  32.6*   RDW  46.5  46.8   PLATELETCT  187  215   MPV  11.4  11.2     Recent Labs      09/12/18 0442 09/13/18   0101  09/14/18   0830   SODIUM  136  137  136   POTASSIUM  4.1  4.2  4.7   CHLORIDE  100  97  97   CO2  30  30  31   GLUCOSE  110*  128*  134*   BUN  25*  34*  37*   CREATININE  0.89  1.12  0.98   CALCIUM  9.3  9.8  9.9                      Assessment/Plan     * Paroxysmal A-fib (HCC)- (present on admission)   Assessment & Plan    A. fib with RVR with syncope on presentation; rate controlled now  Now on  metoprolol dose to 50 mg bid, with IV PRN meds with parameters  Target potassium above 4, magnesium above 2  On Xarelto        Nonischemic cardiomyopathy (HCC)- (present on admission)   Assessment & Plan    Echo July 2018 showed LVEF 40%; cath 7/18 reportedly with no CAD  Changed to oral lasix   Continue lisinopril and metoprolol  Adjust medication as needed        Difficulty voiding- (present on admission)   Assessment & Plan    New onset for last few days;   Now on tamsulosin and not retaining any more  Will monitor         Other chest pain- (present on admission)   Assessment & Plan    Musculoskeletal and reproducible; troponin negative so far; cardiac cath in July 2018 with reported normal coronaries;  Pain med prn and reassure patient.         Essential hypertension- (present on admission)   Assessment & Plan    Stable  Continue outpatient meds        Type 2 diabetes mellitus, without long-term current use of insulin (HCC)- (present on admission)   Assessment & Plan    Holding metformin  Sliding scale insulin        Obesity- (present on admission)   Assessment & Plan    Diet and exercise education          Quality-Core Measures   Reviewed items::  Labs reviewed, Radiology images reviewed and Medications reviewed  DVT: xarelto

## 2018-09-14 NOTE — PROGRESS NOTES
"Received care of pt from NOC RN this am. Pt is A+O. C/O 4/10 \"crushing chest pain.\" Pt states \"like yesterday.\" Medicated pt w/ tramadol po prn.   "

## 2018-09-14 NOTE — PROGRESS NOTES
Orthostatic BP's done. See doc flowsheets. Discussed results with Hospitalist. Holding pm dose po lasix.

## 2018-09-14 NOTE — PROGRESS NOTES
Tele: A fib 74 to 94  - / .08 / -   HR down to 34 today, event resolved. HR up to 150 today , event resolved. Hospitalist notified.     Discussed pt's care with Hospitalist this evening. BP rechecked, SBP 91. Holding po metoprolol this evening. Updated pt concerning plan of care. Will pass care to NOC RN @ [facility].

## 2018-09-14 NOTE — CARE PLAN
"Problem: Risk for Impaired Mobility--Activity Intolerance  Goal: Activity Level appropriate for Discharge or Transfer  Outcome: PROGRESSING AS EXPECTED  Pt up self without difficulties this afternoon. Pt denies light-headedness. Pt ambulates in [place] without problems this afternoon, says \"I feel great.\"       "

## 2018-09-14 NOTE — DISCHARGE PLANNING
Anticipated Discharge Disposition: Shelter    Action: Pt given MTM information and copy of his insurance card for a ride on discharge.  Pt requested for this RN CM to inquire about copay for RX at [facility].     Barriers to Discharge: medical clearance    Plan: Follow up with [facility].

## 2018-09-14 NOTE — CARE PLAN
Problem: Knowledge Deficit  Goal: Knowledge of disease process/condition, treatment plan, diagnostic tests, and medications will improve  Outcome: PROGRESSING AS EXPECTED  Discussed plan of care for today w/ pt this am, he is A+O, he verbalizes understanding of his plan of care, no questions. Emotional support given. Will monitor for educational needs. Pt will not d/c today per Hospitalist. Discussed pt's care with Flex RN. AM labs ordered for tomorrow.       Problem: Pain Management  Goal: Pain level will decrease to patient's comfort goal  Outcome: PROGRESSING AS EXPECTED  Assessing every four hrs for pain per protocol; see doc flowsheets, pt denies c/o pain this afternoon. Received tramadol po prn this am.

## 2018-09-14 NOTE — PROGRESS NOTES
Bedside report with ELISA Syed, pt is alert and oriented on room air, no questions or pain at this time, call bell within reach, will continue to monitor.

## 2018-09-14 NOTE — DISCHARGE PLANNING
Anticipated Discharge Disposition: Shelter    Action: Spoke to [facility].  Pt hasn't filled his RX and they have no record of his xarelto.  MD notified to e scribe medications to [facility].     Barriers to Discharge: medical clearance    Plan: Follow up with medications

## 2018-09-15 VITALS
WEIGHT: 246.25 LBS | OXYGEN SATURATION: 94 % | SYSTOLIC BLOOD PRESSURE: 126 MMHG | HEIGHT: 72 IN | HEART RATE: 61 BPM | TEMPERATURE: 96.8 F | BODY MASS INDEX: 33.35 KG/M2 | RESPIRATION RATE: 14 BRPM | DIASTOLIC BLOOD PRESSURE: 82 MMHG

## 2018-09-15 LAB
ANION GAP SERPL CALC-SCNC: 6 MMOL/L (ref 0–11.9)
BUN SERPL-MCNC: 42 MG/DL (ref 8–22)
CALCIUM SERPL-MCNC: 9.7 MG/DL (ref 8.5–10.5)
CHLORIDE SERPL-SCNC: 98 MMOL/L (ref 96–112)
CO2 SERPL-SCNC: 28 MMOL/L (ref 20–33)
CREAT SERPL-MCNC: 1.12 MG/DL (ref 0.5–1.4)
ERYTHROCYTE [DISTWIDTH] IN BLOOD BY AUTOMATED COUNT: 45.1 FL (ref 35.9–50)
GLUCOSE BLD-MCNC: 110 MG/DL (ref 65–99)
GLUCOSE SERPL-MCNC: 166 MG/DL (ref 65–99)
HCT VFR BLD AUTO: 47.7 % (ref 42–52)
HGB BLD-MCNC: 15.8 G/DL (ref 14–18)
MCH RBC QN AUTO: 28.4 PG (ref 27–33)
MCHC RBC AUTO-ENTMCNC: 33.1 G/DL (ref 33.7–35.3)
MCV RBC AUTO: 85.8 FL (ref 81.4–97.8)
PLATELET # BLD AUTO: 223 K/UL (ref 164–446)
PMV BLD AUTO: 11.2 FL (ref 9–12.9)
POTASSIUM SERPL-SCNC: 4.7 MMOL/L (ref 3.6–5.5)
RBC # BLD AUTO: 5.56 M/UL (ref 4.7–6.1)
SODIUM SERPL-SCNC: 132 MMOL/L (ref 135–145)
WBC # BLD AUTO: 9.8 K/UL (ref 4.8–10.8)

## 2018-09-15 PROCEDURE — A9270 NON-COVERED ITEM OR SERVICE: HCPCS | Performed by: INTERNAL MEDICINE

## 2018-09-15 PROCEDURE — 36415 COLL VENOUS BLD VENIPUNCTURE: CPT

## 2018-09-15 PROCEDURE — 700102 HCHG RX REV CODE 250 W/ 637 OVERRIDE(OP): Performed by: INTERNAL MEDICINE

## 2018-09-15 PROCEDURE — 99217 PR OBSERVATION CARE DISCHARGE: CPT | Performed by: INTERNAL MEDICINE

## 2018-09-15 PROCEDURE — G0378 HOSPITAL OBSERVATION PER HR: HCPCS

## 2018-09-15 PROCEDURE — 700105 HCHG RX REV CODE 258

## 2018-09-15 PROCEDURE — 85027 COMPLETE CBC AUTOMATED: CPT

## 2018-09-15 PROCEDURE — 80048 BASIC METABOLIC PNL TOTAL CA: CPT

## 2018-09-15 PROCEDURE — 82962 GLUCOSE BLOOD TEST: CPT

## 2018-09-15 RX ORDER — SODIUM CHLORIDE 9 MG/ML
INJECTION, SOLUTION INTRAVENOUS
Status: COMPLETED
Start: 2018-09-15 | End: 2018-09-15

## 2018-09-15 RX ORDER — SODIUM CHLORIDE 9 MG/ML
INJECTION, SOLUTION INTRAVENOUS CONTINUOUS
Status: DISCONTINUED | OUTPATIENT
Start: 2018-09-15 | End: 2018-09-15 | Stop reason: HOSPADM

## 2018-09-15 RX ADMIN — SODIUM CHLORIDE 1000 ML: 9 INJECTION, SOLUTION INTRAVENOUS at 05:13

## 2018-09-15 RX ADMIN — METOPROLOL TARTRATE 50 MG: 50 TABLET ORAL at 05:11

## 2018-09-15 RX ADMIN — LISINOPRIL 5 MG: 5 TABLET ORAL at 05:11

## 2018-09-15 RX ADMIN — POTASSIUM CHLORIDE 40 MEQ: 1500 TABLET, EXTENDED RELEASE ORAL at 05:11

## 2018-09-15 RX ADMIN — TAMSULOSIN HYDROCHLORIDE 0.4 MG: 0.4 CAPSULE ORAL at 08:19

## 2018-09-15 RX ADMIN — ASPIRIN 81 MG: 81 TABLET, DELAYED RELEASE ORAL at 05:11

## 2018-09-15 RX ADMIN — FUROSEMIDE 40 MG: 40 TABLET ORAL at 05:11

## 2018-09-15 ASSESSMENT — PAIN SCALES - GENERAL
PAINLEVEL_OUTOF10: 0

## 2018-09-15 NOTE — DISCHARGE SUMMARY
Discharge Summary    CHIEF COMPLAINT ON ADMISSION  Chief Complaint   Patient presents with   • Chest Pain     pt reports CP starting about 30minutes prior to EMS arrival and near syncope.  pt reports trip and fall 3 days ago at work, resulting in right side rib pain and diff breathing.  pt reports similar incident 3 weeks ago, was in afib with rvr at that time.        Reason for Admission  EMS     Admission Date  9/9/2018    CODE STATUS  Full Code    HPI & HOSPITAL COURSE  This is a 53 y.o. male  with PMH of morbid obesity, paroxysmal atrial fibrillation, nonischemic cardiomyopathy with LVEF 40%, who presents with palpitation on 9/9/18. He was found to be on A.fibb with RVR, pt has history of A.fibb and not very complaint with his medication. He received a dose of IV metoprolol in ER and HR went down from 140 to 110. He was admitted for further treatment, and was continued on metoprolol 50 mg BID and HR has fazal under controlled, his outpt regimen of xarelto was also continued.   Pt was found to have lower extremity edema, he was started on diuresis, and has slowly improved.  Pt symptoms has improved, he is feeling well now.  Pt will be discharged today.     The patient met 2-midnight criteria for an inpatient stay at the time of discharge.    Discharge Date  9/15/18        DISCHARGE DIAGNOSES  Principal Problem:    Paroxysmal A-fib (HCC) POA: Yes  Active Problems:    Nonischemic cardiomyopathy (HCC) POA: Yes    Other chest pain POA: Yes    Difficulty voiding POA: Yes    Obesity POA: Yes    Type 2 diabetes mellitus, without long-term current use of insulin (HCC) POA: Yes    Essential hypertension POA: Yes  Resolved Problems:    Hypokalemia POA: Yes      FOLLOW UP  Future Appointments  Date Time Provider Department Center   9/25/2018 3:30 PM Jerad Lomax M.D. [facility]   10/9/2018 9:15 AM Rodolfo Sanchez M.D. [facility] None   11/26/2018 8:40 AM FLOYD Enciso [facility] None     No follow-up provider  specified.    MEDICATIONS ON DISCHARGE     Medication List      START taking these medications      Instructions   furosemide 40 MG Tabs  Commonly known as:  LASIX   Take 1 Tab by mouth every day.  Dose:  40 mg        CHANGE how you take these medications      Instructions   metoprolol 50 MG Tabs  What changed:  · medication strength  · how much to take  Commonly known as:  LOPRESSOR   Take 1 Tab by mouth 2 Times a Day.  Dose:  50 mg        CONTINUE taking these medications      Instructions   acetaminophen 325 MG Tabs  Commonly known as:  TYLENOL   Take 2 Tabs by mouth every 6 hours as needed (Mild Pain; (Pain scale 1-3); Temp greater than 100.5 F).  Dose:  650 mg     aspirin 81 MG EC tablet   Take 1 Tab by mouth every day.  Dose:  81 mg     lisinopril 5 MG Tabs  Commonly known as:  PRINIVIL   Take 1 Tab by mouth every day.  Dose:  5 mg     metFORMIN 500 MG Tabs  Commonly known as:  GLUCOPHAGE   Take 2 Tabs by mouth 2 times a day, with meals.  Dose:  1000 mg     pravastatin 20 MG Tabs  Commonly known as:  PRAVACHOL   Take 1 Tab by mouth every evening.  Dose:  20 mg     rivaroxaban 20 MG Tabs tablet  Commonly known as:  XARELTO   Take 1 Tab by mouth with dinner.  Dose:  20 mg            Allergies  Allergies   Allergen Reactions   • Daptomycin Rash     Body rash  RXN=1/2018     • Pcn [Penicillins] Hives and Rash      Rash & hives  RXN=since a kid   • Unasyn [Ampicillin-Sulbactam Sodium] Hives, Itching and Swelling   • Vancomycin Rash     Red man syndrome       DIET  Orders Placed This Encounter   Procedures   • Diet Order Diabetic     Standing Status:   Standing     Number of Occurrences:   1     Order Specific Question:   Diet:     Answer:   Diabetic [3]       ACTIVITY  As tolerated.      CONSULTATIONS  None     PROCEDURES  None     LABORATORY  Lab Results   Component Value Date    SODIUM 132 (L) 09/15/2018    POTASSIUM 4.7 09/15/2018    CHLORIDE 98 09/15/2018    CO2 28 09/15/2018    GLUCOSE 166 (H) 09/15/2018     BUN 42 (H) 09/15/2018    CREATININE 1.12 09/15/2018        Lab Results   Component Value Date    WBC 9.8 09/15/2018    HEMOGLOBIN 15.8 09/15/2018    HEMATOCRIT 47.7 09/15/2018    PLATELETCT 223 09/15/2018        Total time of the discharge process exceeds 42 minutes.

## 2018-09-15 NOTE — DISCHARGE INSTRUCTIONS
Discharge Instructions    Discharged to home by car with escort. Discharged via wheelchair, hospital escort: Yes.  Special equipment needed: Not Applicable    Be sure to schedule a follow-up appointment with your primary care doctor or any specialists as instructed.     Discharge Plan:   Diet Plan: Discussed  Activity Level: Discussed  Confirmed Follow up Appointment: Patient to Call and Schedule Appointment  Confirmed Symptoms Management: Discussed  Medication Reconciliation Updated: Yes  Pneumococcal Vaccine Administered/Refused: Not given - Patient refused pneumococcal vaccine  Influenza Vaccine Indication: Patient Refuses    I understand that a diet low in cholesterol, fat, and sodium is recommended for good health. Unless I have been given specific instructions below for another diet, I accept this instruction as my diet prescription.   Other diet: Diabetic    · Is patient discharged on Warfarin / Coumadin?   No     Atrial Fibrillation  Introduction  Atrial fibrillation is a type of heartbeat that is irregular or fast (rapid). If you have this condition, your heart keeps quivering in a weird (chaotic) way. This condition can make it so your heart cannot pump blood normally. Having this condition gives a person more risk for stroke, heart failure, and other heart problems. There are different types of atrial fibrillation. Talk with your doctor to learn about the type that you have.  Follow these instructions at home:  · Take over-the-counter and prescription medicines only as told by your doctor.  · If your doctor prescribed a blood-thinning medicine, take it exactly as told. Taking too much of it can cause bleeding. If you do not take enough of it, you will not have the protection that you need against stroke and other problems.  · Do not use any tobacco products. These include cigarettes, chewing tobacco, and e-cigarettes. If you need help quitting, ask your doctor.  · If you have apnea (obstructive sleep  apnea), manage it as told by your doctor.  · Do not drink alcohol.  · Do not drink beverages that have caffeine. These include coffee, soda, and tea.  · Maintain a healthy weight. Do not use diet pills unless your doctor says they are safe for you. Diet pills may make heart problems worse.  · Follow diet instructions as told by your doctor.  · Exercise regularly as told by your doctor.  · Keep all follow-up visits as told by your doctor. This is important.  Contact a doctor if:  · You notice a change in the speed, rhythm, or strength of your heartbeat.  · You are taking a blood-thinning medicine and you notice more bruising.  · You get tired more easily when you move or exercise.  Get help right away if:  · You have pain in your chest or your belly (abdomen).  · You have sweating or weakness.  · You feel sick to your stomach (nauseous).  · You notice blood in your throw up (vomit), poop (stool), or pee (urine).  · You are short of breath.  · You suddenly have swollen feet and ankles.  · You feel dizzy.  · Your suddenly get weak or numb in your face, arms, or legs, especially if it happens on one side of your body.  · You have trouble talking, trouble understanding, or both.  · Your face or your eyelid droops on one side.  These symptoms may be an emergency. Do not wait to see if the symptoms will go away. Get medical help right away. Call your local emergency services (911 in the [place] Do not drive yourself to the hospital.   This information is not intended to replace advice given to you by your health care provider. Make sure you discuss any questions you have with your health care provider.  Document Released: 09/26/2009 Document Revised: 05/25/2017 Document Reviewed: 04/13/2016  © 2017 Elsevier      Depression / Suicide Risk    As you are discharged from this [facility] facility, it is important to learn how to keep safe from harming yourself.    Recognize the warning signs:  · Abrupt changes in personality,  positive or negative- including increase in energy   · Giving away possessions  · Change in eating patterns- significant weight changes-  positive or negative  · Change in sleeping patterns- unable to sleep or sleeping all the time   · Unwillingness or inability to communicate  · Depression  · Unusual sadness, discouragement and loneliness  · Talk of wanting to die  · Neglect of personal appearance   · Rebelliousness- reckless behavior  · Withdrawal from people/activities they love  · Confusion- inability to concentrate     If you or a loved one observes any of these behaviors or has concerns about self-harm, here's what you can do:  · Talk about it- your feelings and reasons for harming yourself  · Remove any means that you might use to hurt yourself (examples: pills, rope, extension cords, firearm)  · Get professional help from the community (Mental Health, Substance Abuse, psychological counseling)  · Do not be alone:Call your Safe Contact- someone whom you trust who will be there for you.  · Call your local CRISIS HOTLINE 853-7311 or 438-867-1218  · Call your local Children's Mobile Crisis Response Team [place] (199) 428-3325 or www.[employer]ures  · Call the toll free National Suicide Prevention Hotlines   · National Suicide Prevention Lifeline 598-431-KAMP (4238)  · National Hope Line Network 800-SUICIDE (979-9537)

## 2018-09-15 NOTE — CARE PLAN
Problem: Safety  Goal: Will remain free from injury  Outcome: PROGRESSING AS EXPECTED  Fall precautions in place. Treaded socks on pt. Appropriate signs on doorway. Bed in lowest position and locked.  Call light and phone within reach. Patient educated on importance of calling nurses before getting out of bed, verbalizes understanding. Bed alarm on.    Problem: Knowledge Deficit  Goal: Knowledge of disease process/condition, treatment plan, diagnostic tests, and medications will improve  Outcome: PROGRESSING AS EXPECTED  Patient educated about POC.  All questions answered in regards to disease process, treatment plan as well as medications.  Patient verbalized understanding and voiced no further questions at this time.

## 2018-09-15 NOTE — PROGRESS NOTES
Discharge order obtained. Printed and reviewed AVS with patient. Removed tele monitor and peripheral IV. Discharged via MMT transport.

## 2018-09-15 NOTE — PROGRESS NOTES
Bedside report received. Patient resting comfortably in bed, no complaints at this time. Bed alarm on, call light within reach.

## 2018-09-15 NOTE — PROGRESS NOTES
Report received by Graciela PÉREZ. Assumed care of pt. Assessment complete, tele box on. Pt A&Ox4, VSS. Pt in no apparent signs of distress. POC discussed. Call light within reach, bed in lowest position, and pt has no further questions at this time.

## 2018-09-16 ENCOUNTER — PATIENT OUTREACH (OUTPATIENT)
Dept: HEALTH INFORMATION MANAGEMENT | Facility: OTHER | Age: 53
End: 2018-09-16

## 2018-09-16 NOTE — PROGRESS NOTES
Placed discharge outreach phone call to pt s/p hospital discharge 9/15/18.  Received recording stating that pt's phone number is not in service.  Discharge outreach letter mailed to pt.

## 2018-09-16 NOTE — LETTER
Sebastián Castro  [address] [place] [address] Street  [place] [address]    September 16, 2018      Dear Sebastián Castro,    [facility] wants to ensure your discharge home is safe and you or your loved ones have had all of your questions answered regarding your care after you leave the hospital.    Our discharge team was unsuccessful in our attempts to contact you telephonically and we wanted to be sure that you had a list of resources and contact information should you have any questions regarding your hospital stay, follow-up instructions, or active medical symptoms.    Questions or Concerns Regarding… Contact   Medical Questions Related to Your Discharge  (7 days a week, 8am-5pm) Contact a Nurse Care Coordinator   [phone]   Medical Questions Not Related to Your Discharge  (24 hours a day / 7 days a week)  Contact the Nurse Health Line   [phone]    Medications or Discharge Instructions Refer to your discharge packet   or contact your -[phone]   Follow-up Appointment(s) Schedule your appointment via [employer]   or contact Scheduling [phone]   Billing Review your statement via [employer]  or contact Billing [phone]   Medical Records Review your records via [employer]   or contact Medical Records [phone]     You can also easily access your medical information, test results and upcoming appointments via the [employer] free online health management tool. You can learn more and sign up at [employer]. For assistance setting up your [employer] account, please call [phone].    Once again, we want to ensure your discharge home is safe and that you have a clear understanding of any next steps in your care. If you have any questions or concerns, please do not hesitate to contact us, we are here for you. Thank you for choosing [facility] for your healthcare needs.    Sincerely,      Your [facility] Healthcare Team

## 2018-09-26 ENCOUNTER — APPOINTMENT (OUTPATIENT)
Dept: RADIOLOGY | Facility: MEDICAL CENTER | Age: 53
End: 2018-09-26
Attending: EMERGENCY MEDICINE
Payer: MEDICAID

## 2018-09-26 ENCOUNTER — HOSPITAL ENCOUNTER (OUTPATIENT)
Facility: MEDICAL CENTER | Age: 53
End: 2018-09-27
Attending: EMERGENCY MEDICINE | Admitting: HOSPITALIST
Payer: MEDICAID

## 2018-09-26 PROBLEM — I48.91 ATRIAL FIBRILLATION WITH RVR (HCC): Status: ACTIVE | Noted: 2018-09-26

## 2018-09-26 PROBLEM — R07.9 PAIN IN THE CHEST: Status: ACTIVE | Noted: 2018-09-26

## 2018-09-26 LAB
ALBUMIN SERPL BCP-MCNC: 3.6 G/DL (ref 3.2–4.9)
ALBUMIN/GLOB SERPL: 1.1 G/DL
ALP SERPL-CCNC: 77 U/L (ref 30–99)
ALT SERPL-CCNC: 24 U/L (ref 2–50)
ANION GAP SERPL CALC-SCNC: 11 MMOL/L (ref 0–11.9)
AST SERPL-CCNC: 18 U/L (ref 12–45)
BASOPHILS # BLD AUTO: 0.2 % (ref 0–1.8)
BASOPHILS # BLD: 0.02 K/UL (ref 0–0.12)
BILIRUB SERPL-MCNC: 1.3 MG/DL (ref 0.1–1.5)
BNP SERPL-MCNC: 115 PG/ML (ref 0–100)
BUN SERPL-MCNC: 24 MG/DL (ref 8–22)
CALCIUM SERPL-MCNC: 9.4 MG/DL (ref 8.5–10.5)
CHLORIDE SERPL-SCNC: 103 MMOL/L (ref 96–112)
CO2 SERPL-SCNC: 22 MMOL/L (ref 20–33)
CREAT SERPL-MCNC: 0.93 MG/DL (ref 0.5–1.4)
EKG IMPRESSION: NORMAL
EOSINOPHIL # BLD AUTO: 0.29 K/UL (ref 0–0.51)
EOSINOPHIL NFR BLD: 3.4 % (ref 0–6.9)
ERYTHROCYTE [DISTWIDTH] IN BLOOD BY AUTOMATED COUNT: 44.6 FL (ref 35.9–50)
GLOBULIN SER CALC-MCNC: 3.4 G/DL (ref 1.9–3.5)
GLUCOSE BLD-MCNC: 95 MG/DL (ref 65–99)
GLUCOSE SERPL-MCNC: 139 MG/DL (ref 65–99)
HCT VFR BLD AUTO: 41 % (ref 42–52)
HGB BLD-MCNC: 14.1 G/DL (ref 14–18)
IMM GRANULOCYTES # BLD AUTO: 0.01 K/UL (ref 0–0.11)
IMM GRANULOCYTES NFR BLD AUTO: 0.1 % (ref 0–0.9)
LYMPHOCYTES # BLD AUTO: 0.88 K/UL (ref 1–4.8)
LYMPHOCYTES NFR BLD: 10.2 % (ref 22–41)
MCH RBC QN AUTO: 28.9 PG (ref 27–33)
MCHC RBC AUTO-ENTMCNC: 34.4 G/DL (ref 33.7–35.3)
MCV RBC AUTO: 84 FL (ref 81.4–97.8)
MONOCYTES # BLD AUTO: 0.38 K/UL (ref 0–0.85)
MONOCYTES NFR BLD AUTO: 4.4 % (ref 0–13.4)
NEUTROPHILS # BLD AUTO: 7.04 K/UL (ref 1.82–7.42)
NEUTROPHILS NFR BLD: 81.7 % (ref 44–72)
NRBC # BLD AUTO: 0 K/UL
NRBC BLD-RTO: 0 /100 WBC
PLATELET # BLD AUTO: 173 K/UL (ref 164–446)
PMV BLD AUTO: 11.4 FL (ref 9–12.9)
POTASSIUM SERPL-SCNC: 3.5 MMOL/L (ref 3.6–5.5)
PROT SERPL-MCNC: 7 G/DL (ref 6–8.2)
RBC # BLD AUTO: 4.88 M/UL (ref 4.7–6.1)
SODIUM SERPL-SCNC: 136 MMOL/L (ref 135–145)
TROPONIN I SERPL-MCNC: <0.01 NG/ML (ref 0–0.04)
TROPONIN I SERPL-MCNC: <0.01 NG/ML (ref 0–0.04)
TSH SERPL DL<=0.005 MIU/L-ACNC: 1.6 UIU/ML (ref 0.38–5.33)
WBC # BLD AUTO: 8.6 K/UL (ref 4.8–10.8)

## 2018-09-26 PROCEDURE — 74175 CTA ABDOMEN W/CONTRAST: CPT

## 2018-09-26 PROCEDURE — 96375 TX/PRO/DX INJ NEW DRUG ADDON: CPT

## 2018-09-26 PROCEDURE — 93005 ELECTROCARDIOGRAM TRACING: CPT | Performed by: HOSPITALIST

## 2018-09-26 PROCEDURE — 700101 HCHG RX REV CODE 250: Performed by: EMERGENCY MEDICINE

## 2018-09-26 PROCEDURE — 700102 HCHG RX REV CODE 250 W/ 637 OVERRIDE(OP): Performed by: HOSPITALIST

## 2018-09-26 PROCEDURE — 84484 ASSAY OF TROPONIN QUANT: CPT

## 2018-09-26 PROCEDURE — G0378 HOSPITAL OBSERVATION PER HR: HCPCS

## 2018-09-26 PROCEDURE — 85025 COMPLETE CBC W/AUTO DIFF WBC: CPT

## 2018-09-26 PROCEDURE — 99285 EMERGENCY DEPT VISIT HI MDM: CPT

## 2018-09-26 PROCEDURE — 96374 THER/PROPH/DIAG INJ IV PUSH: CPT

## 2018-09-26 PROCEDURE — 93010 ELECTROCARDIOGRAM REPORT: CPT | Performed by: INTERNAL MEDICINE

## 2018-09-26 PROCEDURE — A9270 NON-COVERED ITEM OR SERVICE: HCPCS | Performed by: HOSPITALIST

## 2018-09-26 PROCEDURE — 93005 ELECTROCARDIOGRAM TRACING: CPT

## 2018-09-26 PROCEDURE — 99219 PR INITIAL OBSERVATION CARE,LEVL II: CPT | Performed by: HOSPITALIST

## 2018-09-26 PROCEDURE — 93005 ELECTROCARDIOGRAM TRACING: CPT | Performed by: EMERGENCY MEDICINE

## 2018-09-26 PROCEDURE — 83880 ASSAY OF NATRIURETIC PEPTIDE: CPT

## 2018-09-26 PROCEDURE — 71045 X-RAY EXAM CHEST 1 VIEW: CPT

## 2018-09-26 PROCEDURE — 84443 ASSAY THYROID STIM HORMONE: CPT

## 2018-09-26 PROCEDURE — 80053 COMPREHEN METABOLIC PANEL: CPT

## 2018-09-26 PROCEDURE — 700111 HCHG RX REV CODE 636 W/ 250 OVERRIDE (IP): Performed by: EMERGENCY MEDICINE

## 2018-09-26 PROCEDURE — 82962 GLUCOSE BLOOD TEST: CPT

## 2018-09-26 PROCEDURE — 700117 HCHG RX CONTRAST REV CODE 255: Performed by: EMERGENCY MEDICINE

## 2018-09-26 RX ORDER — POLYETHYLENE GLYCOL 3350 17 G/17G
1 POWDER, FOR SOLUTION ORAL
Status: DISCONTINUED | OUTPATIENT
Start: 2018-09-26 | End: 2018-09-27 | Stop reason: HOSPADM

## 2018-09-26 RX ORDER — PRAVASTATIN SODIUM 20 MG
20 TABLET ORAL EVERY EVENING
Status: DISCONTINUED | OUTPATIENT
Start: 2018-09-26 | End: 2018-09-27 | Stop reason: HOSPADM

## 2018-09-26 RX ORDER — BISACODYL 10 MG
10 SUPPOSITORY, RECTAL RECTAL
Status: DISCONTINUED | OUTPATIENT
Start: 2018-09-26 | End: 2018-09-27 | Stop reason: HOSPADM

## 2018-09-26 RX ORDER — METOPROLOL TARTRATE 1 MG/ML
5 INJECTION, SOLUTION INTRAVENOUS ONCE
Status: COMPLETED | OUTPATIENT
Start: 2018-09-26 | End: 2018-09-26

## 2018-09-26 RX ORDER — FUROSEMIDE 40 MG/1
40 TABLET ORAL DAILY
Status: DISCONTINUED | OUTPATIENT
Start: 2018-09-27 | End: 2018-09-27 | Stop reason: HOSPADM

## 2018-09-26 RX ORDER — AMOXICILLIN 250 MG
2 CAPSULE ORAL 2 TIMES DAILY
Status: DISCONTINUED | OUTPATIENT
Start: 2018-09-26 | End: 2018-09-27 | Stop reason: HOSPADM

## 2018-09-26 RX ORDER — MORPHINE SULFATE 4 MG/ML
4 INJECTION, SOLUTION INTRAMUSCULAR; INTRAVENOUS ONCE
Status: COMPLETED | OUTPATIENT
Start: 2018-09-26 | End: 2018-09-26

## 2018-09-26 RX ORDER — DEXTROSE MONOHYDRATE 25 G/50ML
25 INJECTION, SOLUTION INTRAVENOUS
Status: DISCONTINUED | OUTPATIENT
Start: 2018-09-26 | End: 2018-09-27 | Stop reason: HOSPADM

## 2018-09-26 RX ORDER — LISINOPRIL 10 MG/1
5 TABLET ORAL DAILY
Status: DISCONTINUED | OUTPATIENT
Start: 2018-09-27 | End: 2018-09-27 | Stop reason: HOSPADM

## 2018-09-26 RX ORDER — METOPROLOL TARTRATE 1 MG/ML
5 INJECTION, SOLUTION INTRAVENOUS
Status: DISCONTINUED | OUTPATIENT
Start: 2018-09-26 | End: 2018-09-27 | Stop reason: HOSPADM

## 2018-09-26 RX ADMIN — IOHEXOL 100 ML: 350 INJECTION, SOLUTION INTRAVENOUS at 18:02

## 2018-09-26 RX ADMIN — METOPROLOL TARTRATE 50 MG: 25 TABLET, FILM COATED ORAL at 21:56

## 2018-09-26 RX ADMIN — PRAVASTATIN SODIUM 20 MG: 20 TABLET ORAL at 22:36

## 2018-09-26 RX ADMIN — MORPHINE SULFATE 4 MG: 4 INJECTION INTRAVENOUS at 18:20

## 2018-09-26 RX ADMIN — METOPROLOL TARTRATE 5 MG: 1 INJECTION, SOLUTION INTRAVENOUS at 18:25

## 2018-09-26 ASSESSMENT — ENCOUNTER SYMPTOMS
NAUSEA: 0
SORE THROAT: 0
HEADACHES: 0
DEPRESSION: 0
PALPITATIONS: 1
SHORTNESS OF BREATH: 0
DIARRHEA: 0
WHEEZING: 0
DIZZINESS: 0
COUGH: 0
FOCAL WEAKNESS: 0
MYALGIAS: 0
CHILLS: 0
PHOTOPHOBIA: 0
FEVER: 0
ABDOMINAL PAIN: 0
VOMITING: 0
TINGLING: 0

## 2018-09-26 ASSESSMENT — PAIN SCALES - GENERAL
PAINLEVEL_OUTOF10: 4
PAINLEVEL_OUTOF10: 0
PAINLEVEL_OUTOF10: 6

## 2018-09-26 ASSESSMENT — PAIN DESCRIPTION - DESCRIPTORS: DESCRIPTORS: SHARP

## 2018-09-26 NOTE — ED PROVIDER NOTES
"ED Provider Note    Scribed for Javed Chang D.O. by Artemio Mckee. 9/26/2018  4:05 PM    Primary care provider: Jerad Lomax M.D.  Means of arrival: Ambulance  History obtained from: Patient  History limited by: None    CHIEF COMPLAINT  Chief Complaint   Patient presents with   • Chest Pain       HPI  Sebastián Castro is a 53 y.o. male with a history of atrial fibrillation, diabetes, hypercholesterolemia, and hypertension who presents to the Emergency Department with complaints of chest pain onset 1-2 hours ago. Per patient, about 1 hour prior to arrival he was walking with his wife and had sudden onset right sided chest pain which radiated into his back. Patient describes this as a \"sharp\" pain. He rates this as a 6/10 and notes that this pain has been constant since his arrival to the ED. However, about 30 minutes after the onset he did have one intermittent less than 5 minute bout where this chest pain had resolved, but this then continued as a 6/10 pain. En route to the ED, patient was given baby aspirin but this did not alleviate his pain. He also endorses associated nausea, shortness of breath, and 1 episode of vomiting since the onset of his chest pain. The patient does note that he had a similar episode of this chest pain 2 weeks ago and he came into the ED for further evaluation and was admitted to the hospital. While he was in the hospital he had an echocardiogram and a cardiac stress test performed and both of these were unremarkable to his knowledge. The patient also had a cardiac catheter placed in July. He further explains that he recently noticed worsening swelling in bilateral legs but notes that he has a history of leg swelling . He does not have a history of PE, DVT, or CHF. He uses 2 pillows at home to sleep and does admit to occasionally waking up in the middle of the night secondary to difficulty breathing. Patient is currently experiencing shortness of breath, chest pain, and " light-headedness. However, he denies any dizziness, constipation, or abdominal pain. He notes that his last bowel movement was 2 days and this is normal for him. He has never had an appendectomy. Patient also denies any drug use, alcohol use, or tobacco use including a past history of smoking. The patient is currently taking Xarelto for atrial fibrillation. He also has had numbness in his feet since November and this has not recently worsened.     Cardiac Risk Factors:  No Age > 65  Yes Aspirin use within 7 days  No prior history of coronary artery disease  Yes diabetes  Yes hyperlipidemia   Yes hypertension  Yes obesity  No family history of coronary artery disease at a young age <54 yo  No tobacco use   No drugs (methamphetamine or cocaine)  No history of aortic aneurysm   No history of aortic dissection   No history of deep vein thrombosis or pulmonary embolism   No hormone replacement      REVIEW OF SYSTEMS  Pertinent positives include chest pain, shortness of breath, nausea, vomiting, light-headedness. Pertinent negatives include no dizziness, constipation, abdominal pain.  All other systems reviewed and negative.    PAST MEDICAL HISTORY  Past Medical History:   Diagnosis Date   • A-fib (HCC)    • Chest pain    • Diabetes (HCC)    • Diabetic neuropathy (HCC)    • Hypercholesterolemia    • Hypertension    • Morbid obesity (HCC)    • Noncompliance    • Normal cardiac stress test        SURGICAL HISTORY  Past Surgical History:   Procedure Laterality Date   • CARDIAC CATH  07/21/2018    EF 45%, normal coronaries.   • IRRIGATION & DEBRIDEMENT ORTHO Right 2/19/2018    Procedure: IRRIGATION & DEBRIDEMENT ORTHO-FOOT;  Surgeon: Kirby Lopez M.D.;  Location: [facility];  Service: Orthopedics   • TOE AMPUTATION Right 1/17/2018    Procedure: TOE AMPUTATION-1ST RAY;  Surgeon: Doug Delong M.D.;  Location: SUR[facility];  Service: Orthopedics   • TOE AMPUTATION Right 11/10/2017    Procedure:  TOE AMPUTATION;  Surgeon: Osorio Leo M.D.;  Location: SURGERY [facility];  Service: Orthopedics        SOCIAL HISTORY  Social History   Substance Use Topics   • Smoking status: Never Smoker   • Smokeless tobacco: Never Used   • Alcohol use No      History   Drug Use No       FAMILY HISTORY  History reviewed. No pertinent family history.    CURRENT MEDICATIONS  Home Medications     Reviewed by James Perez (Pharmacy Tech) on 09/26/18 at 1935  Med List Status: Complete   Medication Last Dose Status   aspirin 81 MG EC tablet 9/26/2018 Active   furosemide (LASIX) 40 MG Tab 9/26/2018 Active   lisinopril (PRINIVIL) 5 MG Tab 9/26/2018 Active   metFORMIN (GLUCOPHAGE) 500 MG Tab 9/26/2018 Active   metoprolol (LOPRESSOR) 50 MG Tab 9/26/2018 Active   pravastatin (PRAVACHOL) 20 MG Tab 9/25/2018 Active   rivaroxaban (XARELTO) 20 MG Tab tablet 9/25/2018 Active                ALLERGIES  Allergies   Allergen Reactions   • Daptomycin Rash     Body rash  RXN=1/2018     • Pcn [Penicillins] Hives and Rash      Rash & hives  RXN=since a kid   • Unasyn [Ampicillin-Sulbactam Sodium] Hives, Itching and Swelling   • Vancomycin Rash     Red man syndrome       PHYSICAL EXAM  VITAL SIGNS: /75   Pulse (!) 128   Temp 37.5 °C (99.5 °F)   Resp 20   Ht 1.829 m (6')   Wt 105 kg (231 lb 7.7 oz)   SpO2 98%   BMI 31.39 kg/m²     Nursing notes and vitals reviewed.  Constitutional: Well developed, Well nourished, No acute distress, Non-toxic appearance.   Eyes: PERRLA, EOMI, Conjunctiva normal, No discharge.   Cardiovascular: Normal heart rate, Normal rhythm, No murmurs, No rubs, No gallops. 1+ peripheral pulses  Thorax & Lungs: No respiratory distress, No rales, No rhonchi, No wheezing. Left sided chest tenderness to palpation on left side which reportedly radiates to his right. Right sided chest tenderness to palpation which reportedly radiates into his back.   Abdomen: Bowel sounds normal, Soft, No masses, No pulsatile  masses. Diffuse abdominal tenderness. Positive Peacock's sign. Positive Psoas's sign  Skin: Warm, Dry, No erythema, No rash.   Musculoskeletal: Intact distal pulses, No cyanosis, No clubbing. Good range of motion in all major joints, No CVA tenderness, no midline back tenderness. 2+ pitting edema to bilateral lower extremities  Neurologic: Alert & oriented x 3, Normal motor function, Normal sensory function, No focal deficits noted.  Psychiatric: Affect normal for clinical presentation.    DIAGNOSTIC STUDIES/PROCEDURES    LABS  Results for orders placed or performed during the hospital encounter of 09/26/18   CBC WITH DIFFERENTIAL   Result Value Ref Range    WBC 8.6 4.8 - 10.8 K/uL    RBC 4.88 4.70 - 6.10 M/uL    Hemoglobin 14.1 14.0 - 18.0 g/dL    Hematocrit 41.0 (L) 42.0 - 52.0 %    MCV 84.0 81.4 - 97.8 fL    MCH 28.9 27.0 - 33.0 pg    MCHC 34.4 33.7 - 35.3 g/dL    RDW 44.6 35.9 - 50.0 fL    Platelet Count 173 164 - 446 K/uL    MPV 11.4 9.0 - 12.9 fL    Neutrophils-Polys 81.70 (H) 44.00 - 72.00 %    Lymphocytes 10.20 (L) 22.00 - 41.00 %    Monocytes 4.40 0.00 - 13.40 %    Eosinophils 3.40 0.00 - 6.90 %    Basophils 0.20 0.00 - 1.80 %    Immature Granulocytes 0.10 0.00 - 0.90 %    Nucleated RBC 0.00 /100 WBC    Neutrophils (Absolute) 7.04 1.82 - 7.42 K/uL    Lymphs (Absolute) 0.88 (L) 1.00 - 4.80 K/uL    Monos (Absolute) 0.38 0.00 - 0.85 K/uL    Eos (Absolute) 0.29 0.00 - 0.51 K/uL    Baso (Absolute) 0.02 0.00 - 0.12 K/uL    Immature Granulocytes (abs) 0.01 0.00 - 0.11 K/uL    NRBC (Absolute) 0.00 K/uL   COMP METABOLIC PANEL   Result Value Ref Range    Sodium 136 135 - 145 mmol/L    Potassium 3.5 (L) 3.6 - 5.5 mmol/L    Chloride 103 96 - 112 mmol/L    Co2 22 20 - 33 mmol/L    Anion Gap 11.0 0.0 - 11.9    Glucose 139 (H) 65 - 99 mg/dL    Bun 24 (H) 8 - 22 mg/dL    Creatinine 0.93 0.50 - 1.40 mg/dL    Calcium 9.4 8.5 - 10.5 mg/dL    AST(SGOT) 18 12 - 45 U/L    ALT(SGPT) 24 2 - 50 U/L    Alkaline Phosphatase 77 30 - 99  U/L    Total Bilirubin 1.3 0.1 - 1.5 mg/dL    Albumin 3.6 3.2 - 4.9 g/dL    Total Protein 7.0 6.0 - 8.2 g/dL    Globulin 3.4 1.9 - 3.5 g/dL    A-G Ratio 1.1 g/dL   TROPONIN   Result Value Ref Range    Troponin I <0.01 0.00 - 0.04 ng/mL   ESTIMATED GFR   Result Value Ref Range    GFR If African American >60 >60 mL/min/1.73 m 2    GFR If Non African American >60 >60 mL/min/1.73 m 2   EKG   Result Value Ref Range    Report       [facility] Emergency Dept.    Test Date:  2018  Pt Name:    EMELY FLORES                Department: ER  MRN:        [id number]                      Room:       [facility]  Gender:     Male                         Technician: [id number]  :        1965                   Requested By:ER TRIAGE PROTOCOL  Order #:    [id number]                    Reading MD: RAYRAY CROSS DO    Measurements  Intervals                                Axis  Rate:       134                          P:  FL:                                      QRS:        -13  QRSD:       92                           T:          65  QT:         324  QTc:        484    Interpretive Statements  ATRIAL FIBRILLATION, V-RATE 111-160  LOW VOLTAGE IN FRONTAL LEADS  LATE PRECORDIAL R/S TRANSITION  BORDERLINE PROLONGED QT INTERVAL  Compared to ECG 2018 15:50:37  No significant changes    Electronically Signed On 2018 19:55:00 PDT by RAYRAY CROSS DO         All labs reviewed by me.      RADIOLOGY  CT-CTA COMPLETE THORACOABDOMINAL AORTA   Final Result      No evidence of aortic aneurysm or dissection.      Healing fractures of the anterior sixth and seventh ribs on the right.      Healing fracture of the transverse process of L3 on the right.      Mild cardiomegaly.      Mildly prominent periportal lymph node is nonspecific and may be reactive.      Cholelithiasis.      Sequelae of prior granulomatous exposure.      Mild scarring of the right kidney.                     DX-CHEST-PORTABLE (1 VIEW)    Final Result      No acute cardiac or pulmonary abnormalities are identified.        The radiologist's interpretation of all radiological studies have been reviewed by me.    COURSE & MEDICAL DECISION MAKING  Pertinent Labs & Imaging studies reviewed. (See chart for details)    4:05 PM - Patient seen and examined at bedside. Patient will be treated with metoporol  . Ordered CT-CTA complete thoracoabdominal aorta, DX-chest 1 view, CBC with differential, CMP, troponin, estimated GFR, EKG to evaluate his symptoms.     4:34 PM - Ordered CT-CTA complete thoracoabdominal aorta.     5:22 PM - patient will be treated with 4 mg morphine injection, 5 mg Lopressor injection    This is a charming 53 y.o. male that presents with atrial fibrillation with RVR and severe chest pain.  The patient has no evidence of severe endorgan damage, he does not evidence of a significant elevation of troponin, EKG shows no ST elevation myocardial infarction.  The patient received metoprolol 5 mg IV for his A. fib with RVR reevaluation a significant decrease in his heart rate.  CTA pulmonary was completed secondary to severe pain in his chest and back.  Fortunately, CTA was negative for significant abnormality.  The patient has no evidence of ST elevation myocardial infarction, aortic dissection, aneurysm I doubt he has a pulmonary embolism.  The patient has been admitted to Dr. Friedman in guarded condition.    CRITICAL CARE  The very real possibilty of a deterioration of this patient's condition required the highest level of my preparedness for sudden, emergent intervention.  I provided critical care services, which included medication orders, frequent reevaluations of the patient's condition and response to treatment, ordering and reviewing test results, and discussing the case with various consultants.  The critical care time associated with the care of the patient was 35 minutes. Review chart for interventions. This time is exclusive of any  other billable procedures.       FINAL IMPRESSION  Chest pain  Atrial fibrillation with RVR  Critical care 35 minutes     IArtemio (Scribe), am scribing for, and in the presence of, Javed Chang D.O    Electronically signed by: Artemio Mckee (Scribe), 9/26/2018    IJaved D.O. personally performed the services described in this documentation, as scribed by Artemio Mckee in my presence, and it is both accurate and complete. C.     The note accurately reflects work and decisions made by me.  Javed Chang  9/26/2018  7:59 PM

## 2018-09-26 NOTE — ED TRIAGE NOTES
[facility] from [facility] due to hx of afib and states he went for a walk and then felt his heart rate increase along with some shortness of breath. States similar episode 2 weeks ago and was admitted for this- started on Cardizem and lasix.   Pt reports right sided anterior chest pain radiating into back. He appears in no acute resp distress at triage. EMS started 20g IV to right forearm, no meds administered PTA.

## 2018-09-27 VITALS
TEMPERATURE: 97 F | RESPIRATION RATE: 17 BRPM | DIASTOLIC BLOOD PRESSURE: 56 MMHG | HEIGHT: 72 IN | BODY MASS INDEX: 34.01 KG/M2 | WEIGHT: 251.1 LBS | OXYGEN SATURATION: 95 % | HEART RATE: 80 BPM | SYSTOLIC BLOOD PRESSURE: 96 MMHG

## 2018-09-27 PROBLEM — R33.9 URINARY RETENTION: Status: ACTIVE | Noted: 2018-09-27

## 2018-09-27 PROBLEM — R07.9 PAIN IN THE CHEST: Status: RESOLVED | Noted: 2018-09-26 | Resolved: 2018-09-27

## 2018-09-27 PROBLEM — I48.91 ATRIAL FIBRILLATION WITH RVR (HCC): Status: RESOLVED | Noted: 2018-09-26 | Resolved: 2018-09-27

## 2018-09-27 LAB
EKG IMPRESSION: NORMAL
GLUCOSE BLD-MCNC: 102 MG/DL (ref 65–99)
GLUCOSE BLD-MCNC: 88 MG/DL (ref 65–99)
MAGNESIUM SERPL-MCNC: 1.8 MG/DL (ref 1.5–2.5)
TROPONIN I SERPL-MCNC: <0.01 NG/ML (ref 0–0.04)

## 2018-09-27 PROCEDURE — 84484 ASSAY OF TROPONIN QUANT: CPT

## 2018-09-27 PROCEDURE — 700102 HCHG RX REV CODE 250 W/ 637 OVERRIDE(OP): Performed by: INTERNAL MEDICINE

## 2018-09-27 PROCEDURE — 82962 GLUCOSE BLOOD TEST: CPT

## 2018-09-27 PROCEDURE — 51798 US URINE CAPACITY MEASURE: CPT

## 2018-09-27 PROCEDURE — A6250 SKIN SEAL PROTECT MOISTURIZR: HCPCS

## 2018-09-27 PROCEDURE — A9270 NON-COVERED ITEM OR SERVICE: HCPCS | Performed by: HOSPITALIST

## 2018-09-27 PROCEDURE — 302255 BARRIER CREAM MOISTURE BAZA PROTECT (ZINC) 5OZ: Performed by: INTERNAL MEDICINE

## 2018-09-27 PROCEDURE — A9270 NON-COVERED ITEM OR SERVICE: HCPCS | Performed by: INTERNAL MEDICINE

## 2018-09-27 PROCEDURE — G0378 HOSPITAL OBSERVATION PER HR: HCPCS

## 2018-09-27 PROCEDURE — 36415 COLL VENOUS BLD VENIPUNCTURE: CPT

## 2018-09-27 PROCEDURE — 700102 HCHG RX REV CODE 250 W/ 637 OVERRIDE(OP): Performed by: HOSPITALIST

## 2018-09-27 PROCEDURE — 99217 PR OBSERVATION CARE DISCHARGE: CPT | Mod: GC | Performed by: INTERNAL MEDICINE

## 2018-09-27 PROCEDURE — A9270 NON-COVERED ITEM OR SERVICE: HCPCS | Performed by: NURSE PRACTITIONER

## 2018-09-27 PROCEDURE — 83735 ASSAY OF MAGNESIUM: CPT

## 2018-09-27 PROCEDURE — 700102 HCHG RX REV CODE 250 W/ 637 OVERRIDE(OP): Performed by: NURSE PRACTITIONER

## 2018-09-27 RX ORDER — TAMSULOSIN HYDROCHLORIDE 0.4 MG/1
0.4 CAPSULE ORAL
Qty: 30 CAP | Refills: 1 | Status: SHIPPED | OUTPATIENT
Start: 2018-09-28 | End: 2018-09-28 | Stop reason: CLARIF

## 2018-09-27 RX ORDER — POTASSIUM CHLORIDE 20 MEQ/1
40 TABLET, EXTENDED RELEASE ORAL ONCE
Status: COMPLETED | OUTPATIENT
Start: 2018-09-27 | End: 2018-09-27

## 2018-09-27 RX ORDER — TAMSULOSIN HYDROCHLORIDE 0.4 MG/1
0.4 CAPSULE ORAL
Status: DISCONTINUED | OUTPATIENT
Start: 2018-09-27 | End: 2018-09-27 | Stop reason: HOSPADM

## 2018-09-27 RX ADMIN — FUROSEMIDE 40 MG: 40 TABLET ORAL at 05:55

## 2018-09-27 RX ADMIN — ASPIRIN 81 MG: 81 TABLET, COATED ORAL at 05:55

## 2018-09-27 RX ADMIN — TAMSULOSIN HYDROCHLORIDE 0.4 MG: 0.4 CAPSULE ORAL at 11:13

## 2018-09-27 RX ADMIN — POTASSIUM CHLORIDE 40 MEQ: 1500 TABLET, EXTENDED RELEASE ORAL at 09:22

## 2018-09-27 RX ADMIN — LISINOPRIL 5 MG: 10 TABLET ORAL at 05:55

## 2018-09-27 RX ADMIN — METOPROLOL TARTRATE 50 MG: 25 TABLET, FILM COATED ORAL at 05:55

## 2018-09-27 ASSESSMENT — PAIN SCALES - GENERAL
PAINLEVEL_OUTOF10: 0
PAINLEVEL_OUTOF10: 0
PAINLEVEL_OUTOF10: 6
PAINLEVEL_OUTOF10: 0

## 2018-09-27 ASSESSMENT — PAIN SCALES - WONG BAKER
WONGBAKER_NUMERICALRESPONSE: DOESN'T HURT AT ALL
WONGBAKER_NUMERICALRESPONSE: DOESN'T HURT AT ALL

## 2018-09-27 ASSESSMENT — LIFESTYLE VARIABLES
EVER_SMOKED: NEVER
ALCOHOL_USE: NO

## 2018-09-27 NOTE — ASSESSMENT & PLAN NOTE
Patient has presented several times to the hospital with the same complaint.  He was just discharged from our facility 2 weeks ago and at that time had the similar complaint and was treated for atrial fibrillation with rapid ventricular response.  He was seen here in July and underwent catheterization which showed no coronary artery disease.  Suspect this is related to his uncontrolled atrial fibrillation which is likely due to his medical noncompliance.  He will be monitored closely on telemetry, I will trend troponin levels and obtain serial EKGs, I will control his rates, and continue his home Pravachol and aspirin.  I do not believe any further workup is needed at this time other than discussion on his need for medical compliance.

## 2018-09-27 NOTE — ASSESSMENT & PLAN NOTE
Patient's heart rate was initially sustained in the 130s at the time of presentation to the emergency room, it is now significantly improved with rates in the low to low 100s after a dose of IV metoprolol.  I will resume his home oral metoprolol, Xarelto, and place him on as needed IV medications for breakthrough.  I will continue to monitor him on telemetry.

## 2018-09-27 NOTE — DISCHARGE PLANNING
Care Transition Team Assessment    Patient lives at the shelter.  Significant other is Angelika.  They are looking for a place to live as they hate the shelter.  Will need a bus pass to go home upon discharge.        Information Source  Orientation : Oriented x 4  Information Given By: Patient  Informant's Name: Sebastián Castro  Who is responsible for making decisions for patient? : Patient    Readmission Evaluation  Is this a readmission?: Yes - unplanned readmission  Why do you think you were readmitted?:  (sick)  Did you understand your discharge instructions?: Yes    Elopement Risk  Legal Hold: No  Ambulatory or Self Mobile in Wheelchair: No-Not an Elopement Risk  Disoriented: No  Elopement Risk: Not at Risk for Elopement    Interdisciplinary Discharge Planning  Does Admitting Nurse Feel This Could be a Complex Discharge?: No  Primary Care Physician:  (Jerad Lomax)  Lives with - Patient's Self Care Capacity: Other (Comments) (lives at the shelter)  Patient or legal guardian wants to designate a caregiver (see row info): No  Support Systems: Spouse / Significant Other  Housing / Facility: Homeless  Do You Take your Prescribed Medications Regularly: Yes  Able to Return to Previous ADL's: Yes  Mobility Issues: No  Patient Expects to be Discharged to:: Shelter  Assistance Needed: Yes  Durable Medical Equipment: Not Applicable    Discharge Preparedness  What is your plan after discharge?: Other (comment) (Return to shelter)  What are your discharge supports?: Partner (Angelika)  Prior Functional Level: Ambulatory    Functional Assesment  Prior Functional Level: Ambulatory         Vision / Hearing Impairment  Vision Impairment : No  Hearing Impairment : No    Values / Beliefs / Concerns  Values / Beliefs Concerns : No    Advance Directive  Advance Directive?: Living Will, DPOA for Health Care    Domestic Abuse  Have you ever been the victim of abuse or violence?: No  Physical Abuse or Sexual Abuse: No  Verbal Abuse or Emotional  Abuse: No  Possible Abuse Reported to:: Not Applicable         Discharge Risks or Barriers  Discharge risks or barriers?: No  Patient risk factors: Homeless, Complex medical needs    Anticipated Discharge Information  Anticipated discharge disposition:  (Shelter)

## 2018-09-27 NOTE — DISCHARGE INSTRUCTIONS
Discharge Instructions    Discharged to home by car with self. Discharged via wheelchair, hospital escort: Yes.  Special equipment needed: Not Applicable    Be sure to schedule a follow-up appointment with your primary care doctor or any specialists as instructed.     Discharge Plan:   Diet Plan: Discussed  Activity Level: Discussed  Confirmed Follow up Appointment: Patient to Call and Schedule Appointment  Confirmed Symptoms Management: Discussed  Medication Reconciliation Updated: Yes  Influenza Vaccine Indication: Patient Refuses    I understand that a diet low in cholesterol, fat, and sodium is recommended for good health. Unless I have been given specific instructions below for another diet, I accept this instruction as my diet prescription.   Other diet:     Special Instructions: None    · Is patient discharged on Warfarin / Coumadin?   No     Depression / Suicide Risk    As you are discharged from this [facility] facility, it is important to learn how to keep safe from harming yourself.    Recognize the warning signs:  · Abrupt changes in personality, positive or negative- including increase in energy   · Giving away possessions  · Change in eating patterns- significant weight changes-  positive or negative  · Change in sleeping patterns- unable to sleep or sleeping all the time   · Unwillingness or inability to communicate  · Depression  · Unusual sadness, discouragement and loneliness  · Talk of wanting to die  · Neglect of personal appearance   · Rebelliousness- reckless behavior  · Withdrawal from people/activities they love  · Confusion- inability to concentrate     If you or a loved one observes any of these behaviors or has concerns about self-harm, here's what you can do:  · Talk about it- your feelings and reasons for harming yourself  · Remove any means that you might use to hurt yourself (examples: pills, rope, extension cords, firearm)  · Get professional help from the community (Mental Health,  Substance Abuse, psychological counseling)  · Do not be alone:Call your Safe Contact- someone whom you trust who will be there for you.  · Call your local CRISIS HOTLINE 161-6801 or 017-197-2499  · Call your local Children's Mobile Crisis Response Team [place] (602) 558-0578 or www.blogfoster  · Call the toll free National Suicide Prevention Hotlines   · National Suicide Prevention Lifeline 370-481-FCRN (2910)  · National Hope Line Network 800-SUICIDE (525-7348)    Chest Pain, Nonspecific  It is often hard to give a specific diagnosis for the cause of chest pain. There is always a chance that your pain could be related to something serious, like a heart attack or a blood clot in the lungs. You need to follow up with your caregiver for further evaluation. More lab tests or other studies such as X-rays, electrocardiography, stress testing, or cardiac imaging may be needed to find the cause of your pain.  Most of the time, nonspecific chest pain improves within 2 to 3 days with rest and mild pain medicine. For the next few days, avoid physical exertion or activities that bring on pain. Do not smoke. Avoid drinking alcohol. Call your caregiver for routine follow-up as advised.   SEEK IMMEDIATE MEDICAL CARE IF:  · You develop increased chest pain or pain that radiates to the arm, neck, jaw, back, or abdomen.   · You develop shortness of breath, increased coughing, or you start coughing up blood.   · You have severe back or abdominal pain, nausea, or vomiting.   · You develop severe weakness, fainting, fever, or chills.   Document Released: 12/18/2006 Document Revised: 03/11/2013 Document Reviewed: 06/06/2008  ExitCare® Patient Information ©2013 [employer].      Atrial Fibrillation  Introduction  Atrial fibrillation is a type of heartbeat that is irregular or fast (rapid). If you have this condition, your heart keeps quivering in a weird (chaotic) way. This condition can make it so your heart cannot pump  blood normally. Having this condition gives a person more risk for stroke, heart failure, and other heart problems. There are different types of atrial fibrillation. Talk with your doctor to learn about the type that you have.  Follow these instructions at home:  · Take over-the-counter and prescription medicines only as told by your doctor.  · If your doctor prescribed a blood-thinning medicine, take it exactly as told. Taking too much of it can cause bleeding. If you do not take enough of it, you will not have the protection that you need against stroke and other problems.  · Do not use any tobacco products. These include cigarettes, chewing tobacco, and e-cigarettes. If you need help quitting, ask your doctor.  · If you have apnea (obstructive sleep apnea), manage it as told by your doctor.  · Do not drink alcohol.  · Do not drink beverages that have caffeine. These include coffee, soda, and tea.  · Maintain a healthy weight. Do not use diet pills unless your doctor says they are safe for you. Diet pills may make heart problems worse.  · Follow diet instructions as told by your doctor.  · Exercise regularly as told by your doctor.  · Keep all follow-up visits as told by your doctor. This is important.  Contact a doctor if:  · You notice a change in the speed, rhythm, or strength of your heartbeat.  · You are taking a blood-thinning medicine and you notice more bruising.  · You get tired more easily when you move or exercise.  Get help right away if:  · You have pain in your chest or your belly (abdomen).  · You have sweating or weakness.  · You feel sick to your stomach (nauseous).  · You notice blood in your throw up (vomit), poop (stool), or pee (urine).  · You are short of breath.  · You suddenly have swollen feet and ankles.  · You feel dizzy.  · Your suddenly get weak or numb in your face, arms, or legs, especially if it happens on one side of your body.  · You have trouble talking, trouble understanding, or  both.  · Your face or your eyelid droops on one side.  These symptoms may be an emergency. Do not wait to see if the symptoms will go away. Get medical help right away. Call your local emergency services (911 in the [place] Do not drive yourself to the hospital.   This information is not intended to replace advice given to you by your health care provider. Make sure you discuss any questions you have with your health care provider.  Document Released: 09/26/2009 Document Revised: 05/25/2017 Document Reviewed: 04/13/2016  © 2017 [employer]

## 2018-09-27 NOTE — PROGRESS NOTES
Received report from evening RN.  Pt is A/Ox4.  Respirations are even and unlabored.  Pt states continued constant stabbing chest pain that radiates to his right and around back.  CP was reproducible with palpation.   Pt also states difficulty urinating.  MD and APRN made aware and bladder scan ordered.  Pt is moist underneath panis, ordered interdry dressings.  Plan of care reviewed, verbalized understanding.  Call light within reach.

## 2018-09-27 NOTE — ASSESSMENT & PLAN NOTE
Patient has a history of systolic congestive heart failure, echocardiogram obtained in July showed an ejection fraction of 40%.  Continue home Lasix.  No medical evidence of volume overload.

## 2018-09-27 NOTE — ED NOTES
Pt resting comfortably at this time. Respirations even and unlabored. NAD noted. Denies any additional needs.     Pain medication will be administered.

## 2018-09-27 NOTE — CARE PLAN
Problem: Safety  Goal: Will remain free from falls  Outcome: PROGRESSING AS EXPECTED  Anticipate patient's needs. Keep belongings  within reach. Hourly rounding.    Problem: Pain Management  Goal: Pain level will decrease to patient's comfort goal  Outcome: PROGRESSING AS EXPECTED    Intervention: Follow pain managment plan developed in collaboration with patient and Interdisciplinary Team  Monitor patient's pain level.

## 2018-09-27 NOTE — PROGRESS NOTES
Pt had a incontinent of loose stool. Full linen change and bed bath given to pt. Barrier cream ordered.

## 2018-09-27 NOTE — PROGRESS NOTES
IVD/C'd.  Discharge instructions provided to pt.  Pt states understanding.  Pt states all questions have been answered.  Copy of discharge provided to pt.  Signed copy in chart.  Prescriptions provided to pt, copy in chart. Pt states that all personal belongings are in possession.  Pt escorted off unit without incident.

## 2018-09-27 NOTE — PROGRESS NOTES
Transported  from red pod, aox4, afib hr 105  on monitor, steady on his feet. Denies pain or sob. Call light within reach. Needs attended. Plan of care discussed and understood.

## 2018-09-27 NOTE — H&P
Hospital Medicine History & Physical Note    Date of Service  9/26/2018    Primary Care Physician  Jerad Lomax M.D.    Consultants  None    Code Status  Full    Chief Complaint  Chief Complaint   Patient presents with   • Chest Pain       History of Presenting Illness  53 y.o. male who presented on 9/26/2018 with chest pain.  The patient is well-known to the hospitalist services and has had multiple admissions and emergency room visits for similar complaints.  He carries a history of atrial fibrillation on chronic anticoagulation, hypertension, hyperlipidemia, and diabetes mellitus as well as known medical noncompliance.  Today he states that approximately 2 hours prior to his arrival to the hospital, he began to have a right-sided chest pain which radiated toward his back and was associated with palpitations, diaphoresis, nausea, but no lightheadedness.  Prior to this, he states that he has been in his usual state of health, he has had no fevers, chills, abdominal pain, diarrhea or dysuria.  He does however report worsening of his lower extremity edema as well as orthopnea and dyspnea on exertion.  Upon assessment in the emergency room, the patient is noted to have atrial fibrillation with rapid ventricular response.      Review of Systems  Review of Systems   Constitutional: Negative for chills and fever.   HENT: Negative for congestion and sore throat.    Eyes: Negative for photophobia.   Respiratory: Negative for cough, shortness of breath and wheezing.    Cardiovascular: Positive for chest pain and palpitations.   Gastrointestinal: Negative for abdominal pain, diarrhea, nausea and vomiting.   Genitourinary: Negative for dysuria.   Musculoskeletal: Negative for myalgias.   Skin: Negative.    Neurological: Negative for dizziness, tingling, focal weakness and headaches.   Psychiatric/Behavioral: Negative for depression and suicidal ideas.       Past Medical History  Past Medical History:   Diagnosis Date   • A-fib  (HCC)    • Chest pain    • Diabetes (HCC)    • Diabetic neuropathy (HCC)    • Hypercholesterolemia    • Hypertension    • Morbid obesity (HCC)    • Noncompliance    • Normal cardiac stress test        Surgical History  Past Surgical History:   Procedure Laterality Date   • CARDIAC CATH  07/21/2018    EF 45%, normal coronaries.   • IRRIGATION & DEBRIDEMENT ORTHO Right 2/19/2018    Procedure: IRRIGATION & DEBRIDEMENT ORTHO-FOOT;  Surgeon: Kirby Lopez M.D.;  Location: SURGERY [facility];  Service: Orthopedics   • TOE AMPUTATION Right 1/17/2018    Procedure: TOE AMPUTATION-1ST RAY;  Surgeon: Doug Delong M.D.;  Location: SURGERY [facility];  Service: Orthopedics   • TOE AMPUTATION Right 11/10/2017    Procedure: TOE AMPUTATION;  Surgeon: Osorio Leo M.D.;  Location: SURGERY [facility];  Service: Orthopedics       Family History  History reviewed. No pertinent family history.    Social History  Social History   Substance Use Topics   • Smoking status: Never Smoker   • Smokeless tobacco: Never Used   • Alcohol use No       Allergies  Allergies   Allergen Reactions   • Daptomycin Rash     Body rash  RXN=1/2018     • Pcn [Penicillins] Hives and Rash      Rash & hives  RXN=since a kid   • Unasyn [Ampicillin-Sulbactam Sodium] Hives, Itching and Swelling   • Vancomycin Rash     Red man syndrome       Medications  No current facility-administered medications on file prior to encounter.      Current Outpatient Prescriptions on File Prior to Encounter   Medication Sig Dispense Refill   • rivaroxaban (XARELTO) 20 MG Tab tablet Take 1 Tab by mouth with dinner. 30 Tab 0   • aspirin 81 MG EC tablet Take 1 Tab by mouth every day. 30 Tab 3   • metFORMIN (GLUCOPHAGE) 500 MG Tab Take 2 Tabs by mouth 2 times a day, with meals. 60 Tab 0   • pravastatin (PRAVACHOL) 20 MG Tab Take 1 Tab by mouth every evening. 30 Tab 11   • lisinopril (PRINIVIL) 5 MG Tab Take 1 Tab by mouth every day. 30 Tab 0   • metoprolol  (LOPRESSOR) 50 MG Tab Take 1 Tab by mouth 2 Times a Day. 60 Tab 0   • furosemide (LASIX) 40 MG Tab Take 1 Tab by mouth every day. 30 Tab 0       Physical Exam  Hemodynamics  Temp (24hrs), Av.5 °C (99.5 °F), Min:37.5 °C (99.5 °F), Max:37.5 °C (99.5 °F)   Temperature: 37.5 °C (99.5 °F)  Pulse  Av.8  Min: 77  Max: 129 Heart Rate (Monitored): (!) 103  Blood Pressure: 127/75, NIBP: 110/71      Respiratory      Respiration: (!) 28, Pulse Oximetry: 95 %     Work Of Breathing / Effort: Mild       Physical Exam   Constitutional: He is oriented to person, place, and time. No distress.   HENT:   Head: Normocephalic and atraumatic.   Right Ear: External ear normal.   Left Ear: External ear normal.   Eyes: EOM are normal. Right eye exhibits no discharge. Left eye exhibits no discharge.   Neck: Neck supple. No JVD present.   Cardiovascular: Normal rate and normal heart sounds.    Irregular   Pulmonary/Chest: Effort normal and breath sounds normal. No respiratory distress. He exhibits no tenderness.   Abdominal: Soft. Bowel sounds are normal. He exhibits no distension. There is no tenderness.   Musculoskeletal: He exhibits no edema.   Neurological: He is alert and oriented to person, place, and time. No cranial nerve deficit.   Skin: Skin is dry. He is not diaphoretic. No erythema.   Psychiatric: He has a normal mood and affect. His behavior is normal.   Nursing note and vitals reviewed.    Capillary refill less than 3 seconds, distal pulses intact    Laboratory:  Recent Labs      18   1600   WBC  8.6   RBC  4.88   HEMOGLOBIN  14.1   HEMATOCRIT  41.0*   MCV  84.0   MCH  28.9   MCHC  34.4   RDW  44.6   PLATELETCT  173   MPV  11.4     Recent Labs      18   1600   SODIUM  136   POTASSIUM  3.5*   CHLORIDE  103   CO2  22   GLUCOSE  139*   BUN  24*   CREATININE  0.93   CALCIUM  9.4     Recent Labs      18   1600   ALTSGPT  24   ASTSGOT  18   ALKPHOSPHAT  77   TBILIRUBIN  1.3   GLUCOSE  139*                  Lab Results   Component Value Date    TROPONINI <0.01 09/26/2018       Imaging  Dx-chest-limited (1 View)    Result Date: 9/9/2018 9/9/2018 2:43 PM HISTORY/REASON FOR EXAM:  Chest Pain TECHNIQUE/EXAM DESCRIPTION AND NUMBER OF VIEWS: Single portable view of the chest. COMPARISON: 8/24/2018 FINDINGS: No pulmonary infiltrates or consolidations are noted. No pleural effusion. No pneumothorax. Stable cardiopericardial silhouette.     Stable cardiomegaly. No pulmonary infiltrates or consolidations are noted.    Dx-chest-portable (1 View)    Result Date: 9/26/2018 9/26/2018 4:41 PM HISTORY/REASON FOR EXAM:  Chest Pain TECHNIQUE/EXAM DESCRIPTION AND NUMBER OF VIEWS: Single portable view of the chest. COMPARISON: 09/09/2018 FINDINGS: HEART: Stable size. LUNGS: No areas of air space disease are demonstrated. PLEURA: No effusion or pneumothorax.     No acute cardiac or pulmonary abnormalities are identified.    Ct-cta Complete Thoracoabdominal Aorta    Result Date: 9/26/2018 9/26/2018 5:44 PM HISTORY/REASON FOR EXAM:  Chest Pain Atrial fibrillation. Shortness of breath. TECHNIQUE/EXAM DESCRIPTION:  CT angiogram of the chest and abdomen with and without reconstructions. Initial precontrast images were obtained from the lung apices through the diaphragmatic domes. Following this, 100 mL of Omnipaque 350 nonionic contrast was administered at 5 mL/sec and helical scanning was obtained from the lung apices thru the iliac crest and bifurcation. Thick and thin section multiplanar volume reformats were generated from the axial data set in the sagittal and coronal planes. 3D angiographic images were reviewed on PACS. Maximum intensity projection (MIP) images were generated and reviewed. Low dose optimization technique was utilized for this CT exam including automated exposure control and adjustment of the mA and/or kV according to patient size. COMPARISON:  Plain film from the same day. FINDINGS: Noncontrast images: There is no  intramural hematoma. Contrast images: Aorta and vasculature: There is no evidence of aortic aneurysm or dissection. There is coronary artery calcification. Origin of the celiac trunk, SMA, renal arteries and DEL are patent. There are single renal arteries bilaterally. No central or large segmental pulmonary embolus is identified. Small amount of fluid is seen in the pericardial recesses. Heart is mildly enlarged. No pleural effusion is identified. There is no mediastinal lymphadenopathy. There are small hilar lymph nodes. Small axillary lymph nodes are seen. No focal consolidation or pneumothorax is identified. No free air is seen in the abdomen. The pelvis was not included on the current examination. The liver appears unremarkable. There are gallstones within the gallbladder. Pancreas appears unremarkable. There are granulomata within the spleen. No adrenal mass is identified. There is mild cortical scarring of the right kidney. There is no evidence of hydronephrosis. There are small retroperitoneal lymph nodes. Periportal lymph nodes measure up to 1.3 cm in short axis dimension. There are small mesenteric lymph nodes. There is no evidence of bowel obstruction. Terminal ileum and visualized appendix. Unremarkable. No free fluid is seen in the abdomen. There is gynecomastia. Degenerative changes are seen in the spine. There is a healing fracture of the transverse process of L3 on the right.. There are healing fractures of the anterior sixth and seventh ribs on the right.     No evidence of aortic aneurysm or dissection. Healing fractures of the anterior sixth and seventh ribs on the right. Healing fracture of the transverse process of L3 on the right. Mild cardiomegaly. Mildly prominent periportal lymph node is nonspecific and may be reactive. Cholelithiasis. Sequelae of prior granulomatous exposure. Mild scarring of the right kidney.         Assessment/Plan:  Anticipate that patient will need less than 2 midnights  for management of the discussed medical issues.    * Atrial fibrillation with RVR (HCC)   Assessment & Plan    Patient's heart rate was initially sustained in the 130s at the time of presentation to the emergency room, it is now significantly improved with rates in the low to low 100s after a dose of IV metoprolol.  I will resume his home oral metoprolol, Xarelto, and place him on as needed IV medications for breakthrough.  I will continue to monitor him on telemetry.        Pain in the chest   Assessment & Plan    Patient has presented several times to the hospital with the same complaint.  He was just discharged from our facility 2 weeks ago and at that time had the similar complaint and was treated for atrial fibrillation with rapid ventricular response.  He was seen here in July and underwent catheterization which showed no coronary artery disease.  Suspect this is related to his uncontrolled atrial fibrillation which is likely due to his medical noncompliance.  He will be monitored closely on telemetry, I will trend troponin levels and obtain serial EKGs, I will control his rates, and continue his home Pravachol and aspirin.  I do not believe any further workup is needed at this time other than discussion on his need for medical compliance.        Nonischemic cardiomyopathy (HCC)- (present on admission)   Assessment & Plan    Patient has a history of systolic congestive heart failure, echocardiogram obtained in July showed an ejection fraction of 40%.  Continue home Lasix.  No medical evidence of volume overload.        Essential hypertension- (present on admission)   Assessment & Plan    This is chronic and stable, continue home Prinivil.        Mixed hyperlipidemia- (present on admission)   Assessment & Plan    Continue home Pravachol and aspirin        Type 2 diabetes mellitus, without long-term current use of insulin (HCC)- (present on admission)   Assessment & Plan    Continue home metformin, monitor with  Accu-Cheks and insulin sliding scale.            Prophylaxis: Patient is on Xarelto, no additional need for DVT prophylaxis, no PPI indicated, bowel protocol as needed

## 2018-09-27 NOTE — DISCHARGE SUMMARY
Discharge Summary    CHIEF COMPLAINT ON ADMISSION  Chief Complaint   Patient presents with   • Chest Pain     Reason for Admission  Chest Pain     Admission Date  9/26/2018    CODE STATUS  Full Code    HPI & HOSPITAL COURSE  This is a 53 y.o. male here with chest pain.  He has a past medical history of atrial fibrillation on anticoagulation, hypertension, hyperlipidemia, diabetes, nonischemic cardiomyopathy with ejection fraction of 40%, and medical noncompliance.  He is reportedly homeless and presents to the hospital frequently for similar complaints.  On presentation to the emergency room he was noted to be in atrial fibrillation with rapid ventricular response, was returned to a normal rate after a single dose of IV metoprolol and subsequently resolved his chest pain.  Troponins were negative and his BNP was just mildly elevated at 115.  His chest x-ray showed no acute cardiopulmonary abnormality.  CTA was done and showed no evidence of aneurysm or dissection, but did show healing rib fractures, mild cardiomegaly, mild scarring of the right kidney, and cholelithiasis.  No further cardiac workup was done, as the patient recently had an angiogram in July of this year that showed no coronary artery disease.  During his hospitalization he was noted to have difficulty with urination requiring straight cath x1 and he was started on flomax.  His vital signs are within normal limits, his symptoms have resolved, and he is comfortable going home today.  We advised him to follow closely with his PCP, preferably within 1 week of discharge.  A cardiology appointment has also been scheduled for him.    Therefore, he is discharged in good and stable condition to home with close outpatient follow-up.    Discharge Date  9/27/18    FOLLOW UP ITEMS POST DISCHARGE  PCP within 1 week of discharge.  Cardiology as scheduled below.    DISCHARGE DIAGNOSES  Principal Problem:    Atrial fibrillation with RVR (HCC) POA: Yes  Active  Problems:    Pain in the chest due to afib with RVR POA: Yes    Urinary retention POA: No    Type 2 diabetes mellitus, without long-term current use of insulin, with peripheral neuropathy (HCC) POA: Yes    Mixed hyperlipidemia POA: Yes    Essential hypertension POA: Yes    Acquired circulating anticoagulants (HCC) POA: Yes    Nonischemic cardiomyopathy (HCC) POA: Yes  Resolved Problems:    * No resolved hospital problems. *    FOLLOW UP  Future Appointments  Date Time Provider Department Center   10/9/2018 9:15 AM Rodolfo Sanchez M.D. [facility] None   10/9/2018 4:10 PM Jerad Lomax M.D. [facility]   11/26/2018 8:40 AM [facility] None     MEDICATIONS ON DISCHARGE     Medication List      START taking these medications      Instructions   tamsulosin 0.4 MG capsule  Commonly known as:  FLOMAX   Take 1 Cap by mouth ONE-HALF HOUR AFTER BREAKFAST.  Dose:  0.4 mg        CONTINUE taking these medications      Instructions   aspirin 81 MG EC tablet   Take 1 Tab by mouth every day.  Dose:  81 mg     furosemide 40 MG Tabs  Commonly known as:  LASIX   Take 1 Tab by mouth every day.  Dose:  40 mg     lisinopril 5 MG Tabs  Commonly known as:  PRINIVIL   Take 1 Tab by mouth every day.  Dose:  5 mg     metFORMIN 500 MG Tabs  Commonly known as:  GLUCOPHAGE   Take 2 Tabs by mouth 2 times a day, with meals.  Dose:  1000 mg     metoprolol 50 MG Tabs  Commonly known as:  LOPRESSOR   Take 1 Tab by mouth 2 Times a Day.  Dose:  50 mg     pravastatin 20 MG Tabs  Commonly known as:  PRAVACHOL   Take 1 Tab by mouth every evening.  Dose:  20 mg     rivaroxaban 20 MG Tabs tablet  Commonly known as:  XARELTO   Take 1 Tab by mouth with dinner.  Dose:  20 mg          Allergies  Allergies   Allergen Reactions   • Daptomycin Rash     Body rash  RXN=1/2018     • Pcn [Penicillins] Hives and Rash      Rash & hives  RXN=since a kid   • Unasyn [Ampicillin-Sulbactam Sodium] Hives, Itching and Swelling   • Vancomycin Rash     Red  man syndrome     DIET  Orders Placed This Encounter   Procedures   • Diet Order Diabetic     Standing Status:   Standing     Number of Occurrences:   1     Order Specific Question:   Diet:     Answer:   Diabetic [3]     ACTIVITY  As tolerated.  Exercise encouraged.    CONSULTATIONS  None    PROCEDURES  None    LABORATORY  Lab Results   Component Value Date    SODIUM 136 09/26/2018    POTASSIUM 3.5 (L) 09/26/2018    CHLORIDE 103 09/26/2018    CO2 22 09/26/2018    GLUCOSE 139 (H) 09/26/2018    BUN 24 (H) 09/26/2018    CREATININE 0.93 09/26/2018      Lab Results   Component Value Date    WBC 8.6 09/26/2018    HEMOGLOBIN 14.1 09/26/2018    HEMATOCRIT 41.0 (L) 09/26/2018    PLATELETCT 173 09/26/2018      Total time of the discharge process exceeds 40 minutes.

## 2018-09-28 ENCOUNTER — APPOINTMENT (OUTPATIENT)
Dept: RADIOLOGY | Facility: MEDICAL CENTER | Age: 53
End: 2018-09-28
Payer: MEDICAID

## 2018-09-28 ENCOUNTER — APPOINTMENT (OUTPATIENT)
Dept: RADIOLOGY | Facility: MEDICAL CENTER | Age: 53
End: 2018-09-28
Attending: EMERGENCY MEDICINE
Payer: MEDICAID

## 2018-09-28 ENCOUNTER — HOSPITAL ENCOUNTER (EMERGENCY)
Facility: MEDICAL CENTER | Age: 53
End: 2018-09-28
Attending: EMERGENCY MEDICINE
Payer: MEDICAID

## 2018-09-28 VITALS
OXYGEN SATURATION: 100 % | HEIGHT: 72 IN | BODY MASS INDEX: 27.63 KG/M2 | TEMPERATURE: 98.4 F | RESPIRATION RATE: 18 BRPM | HEART RATE: 107 BPM | WEIGHT: 204 LBS

## 2018-09-28 DIAGNOSIS — F44.5 PSEUDOSEIZURE: ICD-10-CM

## 2018-09-28 LAB
ALBUMIN SERPL BCP-MCNC: 3.5 G/DL (ref 3.2–4.9)
ALBUMIN/GLOB SERPL: 1 G/DL
ALP SERPL-CCNC: 80 U/L (ref 30–99)
ALT SERPL-CCNC: 29 U/L (ref 2–50)
ANION GAP SERPL CALC-SCNC: 10 MMOL/L (ref 0–11.9)
APTT PPP: 28.8 SEC (ref 24.7–36)
AST SERPL-CCNC: 22 U/L (ref 12–45)
BASOPHILS # BLD AUTO: 0.3 % (ref 0–1.8)
BASOPHILS # BLD: 0.02 K/UL (ref 0–0.12)
BILIRUB SERPL-MCNC: 0.9 MG/DL (ref 0.1–1.5)
BNP SERPL-MCNC: 102 PG/ML (ref 0–100)
BUN SERPL-MCNC: 32 MG/DL (ref 8–22)
CALCIUM SERPL-MCNC: 8.9 MG/DL (ref 8.5–10.5)
CHLORIDE SERPL-SCNC: 105 MMOL/L (ref 96–112)
CO2 SERPL-SCNC: 21 MMOL/L (ref 20–33)
CREAT SERPL-MCNC: 1.12 MG/DL (ref 0.5–1.4)
EKG IMPRESSION: NORMAL
EOSINOPHIL # BLD AUTO: 0.28 K/UL (ref 0–0.51)
EOSINOPHIL NFR BLD: 4.4 % (ref 0–6.9)
ERYTHROCYTE [DISTWIDTH] IN BLOOD BY AUTOMATED COUNT: 44.5 FL (ref 35.9–50)
GLOBULIN SER CALC-MCNC: 3.4 G/DL (ref 1.9–3.5)
GLUCOSE BLD-MCNC: 128 MG/DL (ref 65–99)
GLUCOSE SERPL-MCNC: 126 MG/DL (ref 65–99)
HCT VFR BLD AUTO: 39.4 % (ref 42–52)
HGB BLD-MCNC: 13.5 G/DL (ref 14–18)
IMM GRANULOCYTES # BLD AUTO: 0.02 K/UL (ref 0–0.11)
IMM GRANULOCYTES NFR BLD AUTO: 0.3 % (ref 0–0.9)
INR PPP: 1.12 (ref 0.87–1.13)
LIPASE SERPL-CCNC: 9 U/L (ref 11–82)
LYMPHOCYTES # BLD AUTO: 1.72 K/UL (ref 1–4.8)
LYMPHOCYTES NFR BLD: 27 % (ref 22–41)
MCH RBC QN AUTO: 28.9 PG (ref 27–33)
MCHC RBC AUTO-ENTMCNC: 34.3 G/DL (ref 33.7–35.3)
MCV RBC AUTO: 84.4 FL (ref 81.4–97.8)
MONOCYTES # BLD AUTO: 0.65 K/UL (ref 0–0.85)
MONOCYTES NFR BLD AUTO: 10.2 % (ref 0–13.4)
NEUTROPHILS # BLD AUTO: 3.67 K/UL (ref 1.82–7.42)
NEUTROPHILS NFR BLD: 57.8 % (ref 44–72)
NRBC # BLD AUTO: 0 K/UL
NRBC BLD-RTO: 0 /100 WBC
PLATELET # BLD AUTO: 169 K/UL (ref 164–446)
PMV BLD AUTO: 11.3 FL (ref 9–12.9)
POTASSIUM SERPL-SCNC: 3.6 MMOL/L (ref 3.6–5.5)
PROT SERPL-MCNC: 6.9 G/DL (ref 6–8.2)
PROTHROMBIN TIME: 14.5 SEC (ref 12–14.6)
RBC # BLD AUTO: 4.67 M/UL (ref 4.7–6.1)
SODIUM SERPL-SCNC: 136 MMOL/L (ref 135–145)
TROPONIN I SERPL-MCNC: <0.01 NG/ML (ref 0–0.04)
WBC # BLD AUTO: 6.4 K/UL (ref 4.8–10.8)

## 2018-09-28 PROCEDURE — 82962 GLUCOSE BLOOD TEST: CPT

## 2018-09-28 PROCEDURE — 85610 PROTHROMBIN TIME: CPT

## 2018-09-28 PROCEDURE — 85730 THROMBOPLASTIN TIME PARTIAL: CPT

## 2018-09-28 PROCEDURE — 85025 COMPLETE CBC W/AUTO DIFF WBC: CPT

## 2018-09-28 PROCEDURE — 93005 ELECTROCARDIOGRAM TRACING: CPT

## 2018-09-28 PROCEDURE — 93005 ELECTROCARDIOGRAM TRACING: CPT | Performed by: EMERGENCY MEDICINE

## 2018-09-28 PROCEDURE — 70450 CT HEAD/BRAIN W/O DYE: CPT

## 2018-09-28 PROCEDURE — 80053 COMPREHEN METABOLIC PANEL: CPT

## 2018-09-28 PROCEDURE — 99285 EMERGENCY DEPT VISIT HI MDM: CPT

## 2018-09-28 PROCEDURE — 84484 ASSAY OF TROPONIN QUANT: CPT

## 2018-09-28 PROCEDURE — 71045 X-RAY EXAM CHEST 1 VIEW: CPT

## 2018-09-28 PROCEDURE — 83690 ASSAY OF LIPASE: CPT

## 2018-09-28 PROCEDURE — 36415 COLL VENOUS BLD VENIPUNCTURE: CPT

## 2018-09-28 PROCEDURE — 83880 ASSAY OF NATRIURETIC PEPTIDE: CPT

## 2018-09-28 RX ORDER — LISINOPRIL 10 MG/1
10 TABLET ORAL DAILY
COMMUNITY
End: 2018-09-28 | Stop reason: CLARIF

## 2018-09-28 ASSESSMENT — PAIN SCALES - GENERAL: PAINLEVEL_OUTOF10: 8

## 2018-09-28 NOTE — ED NOTES
This RN returned from break.   Pt continues resting in bed w/ girlfriend at side; they are talking and watching TV. Pt updated on plan for CT, verbalizes understanding. Reports no other needs at this time. Bed is locked and in lowest position; call light in reach. Will continue to monitor closely.

## 2018-09-28 NOTE — ED NOTES
Unable to obtain med rec.  Patient does not know where he fills his meds, he states that they are [facility] at the shelter.  I called [facility] and his meds were all filled but they were returned to stock because he never picked them up.      I placed calls to [facility], [facility], [facility], [facility] and [facility].

## 2018-09-28 NOTE — ED NOTES
Pt back from CT, laying supine w/ eyes open but staring off and pt does not respond verbally for several seconds. Pt then squeezes his eyes shut and opens them, and is able to answer questions appropriately.

## 2018-09-28 NOTE — ED PROVIDER NOTES
ED Provider Note    Scribed for Dr. Diogenes Shah M.D. by Sommer Woodward. 9/28/2018  2:25 PM    Primary care provider: Jerad Lomax M.D.  Means of arrival: Ambulance  History obtained from: Patient  History limited by: None    CHIEF COMPLAINT  •  Seizure    HPI  Sebastián Castro is a 53 y.o. male who presents to the Emergency Department by ambulance for a seizure with an onset of today. 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 He is employed as a  in a [employer]. He reports falling backwards and struck his occiput. Negative loss of consciousness, headache, vision changes or vomiting. Patient's friend states she went to get the patient water this morning as his skin was flushed. When she returned, the patient was actively seizing described as his extremities were convulsing, and he was incoherent.  It does not sound however is if he was unconscious there was no apparent postictal.  At this time, the patient is improved but slow to respond. Negative for chest pain, shortness of breath or vomiting. Recent admission for atrial fibrillation earlier this month and is on Xarelto for a history of DVT.      REVIEW OF SYSTEMS  Pertinent positives include recent ground level fall, occipital head injury and seizure.   Pertinent negatives include no loss of consciousness, headache, vision changes, vomiting, chest pain, shortness of breath or vomiting  As above, all other systems reviewed and are negative. See HPI for further details.       PAST MEDICAL HISTORY  Patient has a past medical history of A-fib (HCC); Chest pain; Diabetes (HCC); Diabetic neuropathy (HCC); DVT; Hypercholesterolemia; Hypertension; Morbid obesity (HCC); Noncompliance; and Normal cardiac stress test.      SURGICAL HISTORY  Patient has a past surgical history that includes toe amputation (Right, 11/10/2017); toe amputation (Right, 1/17/2018); irrigation & debridement ortho (Right, 2/19/2018); and cardiac  arlin (07/21/2018).      SOCIAL HISTORY  Social History   Substance Use Topics   • Smoking status: Never Smoker   • Smokeless tobacco: Never Used   • Alcohol use No      History   Drug Use No   Patient is employed as a  in a casino.      FAMILY HISTORY  History reviewed. No pertinent family history.      CURRENT MEDICATIONS  Home Medications     Reviewed by James Jones (Pharmacy Tech) on 09/28/18 at 1556  Med List Status: Unable to Obtain   Medication Last Dose Status        Patient Leobardo Taking any Medications                       ALLERGIES  Allergies   Allergen Reactions   • Daptomycin Rash     Body rash  RXN=1/2018     • Pcn [Penicillins] Hives and Rash      Rash & hives  RXN=since a kid   • Unasyn [Ampicillin-Sulbactam Sodium] Hives, Itching and Swelling   • Vancomycin Rash     Red man syndrome       PHYSICAL EXAM  VITAL SIGNS: Pulse (!) 126   Temp 36.9 °C (98.4 °F)   Resp 20   Ht 1.829 m (6')   Wt 92.5 kg (204 lb)   SpO2 96%   BMI 27.67 kg/m²     Constitutional: Well developed, Well nourished, No distress, Non-toxic appearance.   HENT: Normocephalic, Atraumatic, Bilateral external ears normal, Oropharynx moist, No oral exudates.   Eyes: PERRLA, EOMI, Conjunctiva normal, No discharge.   Neck: No tenderness, Supple, No stridor.   Lymphatic: No lymphadenopathy noted.   Cardiovascular: Normal heart rate, Normal rhythm.   Thorax & Lungs: Clear to auscultation bilaterally, No respiratory distress, No wheezing, No crackles.   Abdomen: Soft, No tenderness, No masses, No pulsatile masses.   Skin: Warm, Dry, Flushed.  Extremities: Bilateral lower extremity edema, No cyanosis.   Musculoskeletal: No tenderness to palpation or major deformities noted.  Intact distal pulses  Neurologic: Awake, alert. Moves all extremities spontaneously.  Psychiatric: Affect normal, Judgment normal, Mood normal.       EKG  See labs below. Interpreted by me.       LABS  Results for orders placed or performed  during the hospital encounter of 09/28/18   Troponin   Result Value Ref Range    Troponin I <0.01 0.00 - 0.04 ng/mL   Btype Natriuretic Peptide   Result Value Ref Range    B Natriuretic Peptide 102 (H) 0 - 100 pg/mL   CBC with Differential   Result Value Ref Range    WBC 6.4 4.8 - 10.8 K/uL    RBC 4.67 (L) 4.70 - 6.10 M/uL    Hemoglobin 13.5 (L) 14.0 - 18.0 g/dL    Hematocrit 39.4 (L) 42.0 - 52.0 %    MCV 84.4 81.4 - 97.8 fL    MCH 28.9 27.0 - 33.0 pg    MCHC 34.3 33.7 - 35.3 g/dL    RDW 44.5 35.9 - 50.0 fL    Platelet Count 169 164 - 446 K/uL    MPV 11.3 9.0 - 12.9 fL    Neutrophils-Polys 57.80 44.00 - 72.00 %    Lymphocytes 27.00 22.00 - 41.00 %    Monocytes 10.20 0.00 - 13.40 %    Eosinophils 4.40 0.00 - 6.90 %    Basophils 0.30 0.00 - 1.80 %    Immature Granulocytes 0.30 0.00 - 0.90 %    Nucleated RBC 0.00 /100 WBC    Neutrophils (Absolute) 3.67 1.82 - 7.42 K/uL    Lymphs (Absolute) 1.72 1.00 - 4.80 K/uL    Monos (Absolute) 0.65 0.00 - 0.85 K/uL    Eos (Absolute) 0.28 0.00 - 0.51 K/uL    Baso (Absolute) 0.02 0.00 - 0.12 K/uL    Immature Granulocytes (abs) 0.02 0.00 - 0.11 K/uL    NRBC (Absolute) 0.00 K/uL   Complete Metabolic Panel (CMP)   Result Value Ref Range    Sodium 136 135 - 145 mmol/L    Potassium 3.6 3.6 - 5.5 mmol/L    Chloride 105 96 - 112 mmol/L    Co2 21 20 - 33 mmol/L    Anion Gap 10.0 0.0 - 11.9    Glucose 126 (H) 65 - 99 mg/dL    Bun 32 (H) 8 - 22 mg/dL    Creatinine 1.12 0.50 - 1.40 mg/dL    Calcium 8.9 8.5 - 10.5 mg/dL    AST(SGOT) 22 12 - 45 U/L    ALT(SGPT) 29 2 - 50 U/L    Alkaline Phosphatase 80 30 - 99 U/L    Total Bilirubin 0.9 0.1 - 1.5 mg/dL    Albumin 3.5 3.2 - 4.9 g/dL    Total Protein 6.9 6.0 - 8.2 g/dL    Globulin 3.4 1.9 - 3.5 g/dL    A-G Ratio 1.0 g/dL   Prothrombin Time   Result Value Ref Range    PT 14.5 12.0 - 14.6 sec    INR 1.12 0.87 - 1.13   APTT   Result Value Ref Range    APTT 28.8 24.7 - 36.0 sec   Lipase   Result Value Ref Range    Lipase 9 (L) 11 - 82 U/L   ESTIMATED  GFR   Result Value Ref Range    GFR If African American >60 >60 mL/min/1.73 m 2    GFR If Non African American >60 >60 mL/min/1.73 m 2   ACCU-CHEK GLUCOSE   Result Value Ref Range    Glucose - Accu-Ck 128 (H) 65 - 99 mg/dL   EKG   Result Value Ref Range    Report       [facility] Emergency Dept.    Test Date:  2018  Pt Name:    EMELY FLORES                Department: ER  MRN:        [id number]                      Room:        21  Gender:     Male                         Technician: [id number]  :        1965                   Requested By:ER TRIAGE PROTOCOL  Order #:    [id number]                    Reading MD:    Measurements  Intervals                                Axis  Rate:       112                          P:  WI:                                      QRS:        22  QRSD:       94                           T:          29  QT:         328  QTc:        448    Interpretive Statements  ATRIAL FIBRILLATION, V-RATE    LOW VOLTAGE IN FRONTAL LEADS  BORDERLINE R WAVE PROGRESSION, ANTERIOR LEADS  Compared to ECG 2018 23:36:21  No significant changes        All labs reviewed by me.        RADIOLOGY  CT-HEAD W/O   Final Result      1. Cerebral atrophy.   2. Otherwise, Head CT without contrast within normal limits. No evidence of acute cerebral infarction, hemorrhage or mass lesion.      DX-CHEST-LIMITED (1 VIEW)   Final Result      Hypoinflation.   Scattered atelectasis.        The radiologist's interpretation of all radiological studies have been reviewed by me.      COURSE & MEDICAL DECISION MAKING  Pertinent Labs & Imaging studies reviewed. (See chart for details)    Differential Diagnoses include but are not limited to: seizure, electrolyte abnormality or intracranial hemorrhage.    2:26 PM Patient seen and examined at bedside. Patient presents for a seizure.     Initial orders in the Emergency Department included EKG, XR chest, CT head and laboratory testing: troponin, BNP,  CBC with differential, CMP, estimated GFR, prothrombin time, APTT, lipase and Accu-chek glucose.     2:35 PM Obtained and reviewed patient's electronic medical records which indicate a history of DVT and is on Xarelto. Recent admission for atrial fibrillation.        Decision Making:  Patient has had some episodes here they are not clearly seizure is possible there is some sort of partial seizure not grand mal seizure think it is much more likely these are pseudoseizures or near syncopal events.  He does not appear to have any post ictal..  He reportedly had a history of head injury recently I did do a CT scan since she is on Xarelto and that is negative.  The patient was just hospitalized the previous day I do not think repeat hospitalization is indicated it seems unlikely these are seizures to me after witnessing 1.  I do think he needs follow-up and will be referred to the neurologist and/or to his primary care.  If he has recurrent episodes of he may need reevaluation in the ER over the weekend.  These seem most likely to be pseudoseizures      DISPOSITION  Patient will be discharged home in stable condition.      FOLLOW UP  Jerad Lomax M.D.  [address] [place] [id number]  [phone]    Go to         The patient is referred to a primary physician for blood pressure management, diabetic screening, and for all other preventative health concerns.      OUTPATIENT MEDICATIONS  There are no discharge medications for this patient.      DIAGNOSIS  1. Pseudoseizure           The note accurately reflects work and decisions made by me.  Diogenes Shah  9/29/2018  1:57 PM     Sommer CLARKE (Scribshanel), am scribing for, and in the presence of, Diogenes Shah M.D.    Electronically signed by: Sommer Woodward (Scribshanel), 9/28/2018    Diogenes CLARKE M.D. personally performed the services described in this documentation, as scribed by Sommer Woodward in my presence, and it is both accurate and complete. C.

## 2018-09-28 NOTE — ED TRIAGE NOTES
[facility] EMS states upon their arrival, pt was on floor and seemed post-ictal as he required sternal rub to get a response. Pt now awake, oriented x3 but slow to respond. Pt was DCed yesterday after admission for PE.

## 2018-09-28 NOTE — ED NOTES
Pt has had 2 episodes where his whole body shakes for 10-20 seconds. While this happens, he grabs on to bed rail w/ right hand. HR does not alter, and maintains spo2 97% on RA throughout seizure-like activity. Pt is responsive afterward.   Pt endorses CP, HA and nausea.

## 2018-10-05 ENCOUNTER — APPOINTMENT (OUTPATIENT)
Dept: RADIOLOGY | Facility: MEDICAL CENTER | Age: 53
End: 2018-10-05
Attending: EMERGENCY MEDICINE
Payer: MEDICAID

## 2018-10-05 ENCOUNTER — HOSPITAL ENCOUNTER (EMERGENCY)
Facility: MEDICAL CENTER | Age: 53
End: 2018-10-05
Attending: EMERGENCY MEDICINE
Payer: MEDICAID

## 2018-10-05 VITALS
OXYGEN SATURATION: 97 % | RESPIRATION RATE: 26 BRPM | DIASTOLIC BLOOD PRESSURE: 87 MMHG | BODY MASS INDEX: 39.82 KG/M2 | SYSTOLIC BLOOD PRESSURE: 141 MMHG | HEART RATE: 94 BPM | HEIGHT: 72 IN | WEIGHT: 294 LBS | TEMPERATURE: 98.4 F

## 2018-10-05 DIAGNOSIS — R07.89 OTHER CHEST PAIN: ICD-10-CM

## 2018-10-05 DIAGNOSIS — I48.20 CHRONIC ATRIAL FIBRILLATION (HCC): ICD-10-CM

## 2018-10-05 LAB
ALBUMIN SERPL BCP-MCNC: 3.6 G/DL (ref 3.2–4.9)
ALBUMIN/GLOB SERPL: 1.1 G/DL
ALP SERPL-CCNC: 83 U/L (ref 30–99)
ALT SERPL-CCNC: 18 U/L (ref 2–50)
ANION GAP SERPL CALC-SCNC: 9 MMOL/L (ref 0–11.9)
APTT PPP: 30.5 SEC (ref 24.7–36)
AST SERPL-CCNC: 20 U/L (ref 12–45)
BASOPHILS # BLD AUTO: 0.6 % (ref 0–1.8)
BASOPHILS # BLD: 0.05 K/UL (ref 0–0.12)
BILIRUB SERPL-MCNC: 0.6 MG/DL (ref 0.1–1.5)
BNP SERPL-MCNC: 101 PG/ML (ref 0–100)
BUN SERPL-MCNC: 30 MG/DL (ref 8–22)
CALCIUM SERPL-MCNC: 9.5 MG/DL (ref 8.5–10.5)
CHLORIDE SERPL-SCNC: 103 MMOL/L (ref 96–112)
CO2 SERPL-SCNC: 24 MMOL/L (ref 20–33)
CREAT SERPL-MCNC: 0.86 MG/DL (ref 0.5–1.4)
EKG IMPRESSION: NORMAL
EOSINOPHIL # BLD AUTO: 0.27 K/UL (ref 0–0.51)
EOSINOPHIL NFR BLD: 3.1 % (ref 0–6.9)
ERYTHROCYTE [DISTWIDTH] IN BLOOD BY AUTOMATED COUNT: 48.3 FL (ref 35.9–50)
GLOBULIN SER CALC-MCNC: 3.2 G/DL (ref 1.9–3.5)
GLUCOSE SERPL-MCNC: 105 MG/DL (ref 65–99)
HCT VFR BLD AUTO: 41.5 % (ref 42–52)
HGB BLD-MCNC: 13.1 G/DL (ref 14–18)
IMM GRANULOCYTES # BLD AUTO: 0.02 K/UL (ref 0–0.11)
IMM GRANULOCYTES NFR BLD AUTO: 0.2 % (ref 0–0.9)
INR PPP: 1.03 (ref 0.87–1.13)
LIPASE SERPL-CCNC: 20 U/L (ref 11–82)
LYMPHOCYTES # BLD AUTO: 2.18 K/UL (ref 1–4.8)
LYMPHOCYTES NFR BLD: 25.1 % (ref 22–41)
MCH RBC QN AUTO: 28.3 PG (ref 27–33)
MCHC RBC AUTO-ENTMCNC: 31.6 G/DL (ref 33.7–35.3)
MCV RBC AUTO: 89.6 FL (ref 81.4–97.8)
MONOCYTES # BLD AUTO: 0.51 K/UL (ref 0–0.85)
MONOCYTES NFR BLD AUTO: 5.9 % (ref 0–13.4)
NEUTROPHILS # BLD AUTO: 5.64 K/UL (ref 1.82–7.42)
NEUTROPHILS NFR BLD: 65.1 % (ref 44–72)
NRBC # BLD AUTO: 0 K/UL
NRBC BLD-RTO: 0 /100 WBC
PLATELET # BLD AUTO: 206 K/UL (ref 164–446)
PMV BLD AUTO: 10.5 FL (ref 9–12.9)
POTASSIUM SERPL-SCNC: 4.4 MMOL/L (ref 3.6–5.5)
PROT SERPL-MCNC: 6.8 G/DL (ref 6–8.2)
PROTHROMBIN TIME: 13.6 SEC (ref 12–14.6)
RBC # BLD AUTO: 4.63 M/UL (ref 4.7–6.1)
SODIUM SERPL-SCNC: 136 MMOL/L (ref 135–145)
TROPONIN I SERPL-MCNC: <0.01 NG/ML (ref 0–0.04)
TROPONIN I SERPL-MCNC: <0.01 NG/ML (ref 0–0.04)
WBC # BLD AUTO: 8.7 K/UL (ref 4.8–10.8)

## 2018-10-05 PROCEDURE — 71045 X-RAY EXAM CHEST 1 VIEW: CPT

## 2018-10-05 PROCEDURE — 36415 COLL VENOUS BLD VENIPUNCTURE: CPT

## 2018-10-05 PROCEDURE — 96376 TX/PRO/DX INJ SAME DRUG ADON: CPT

## 2018-10-05 PROCEDURE — 85025 COMPLETE CBC W/AUTO DIFF WBC: CPT

## 2018-10-05 PROCEDURE — 80053 COMPREHEN METABOLIC PANEL: CPT

## 2018-10-05 PROCEDURE — 700101 HCHG RX REV CODE 250: Performed by: EMERGENCY MEDICINE

## 2018-10-05 PROCEDURE — 96374 THER/PROPH/DIAG INJ IV PUSH: CPT

## 2018-10-05 PROCEDURE — 85730 THROMBOPLASTIN TIME PARTIAL: CPT

## 2018-10-05 PROCEDURE — 94760 N-INVAS EAR/PLS OXIMETRY 1: CPT

## 2018-10-05 PROCEDURE — 84484 ASSAY OF TROPONIN QUANT: CPT

## 2018-10-05 PROCEDURE — 85610 PROTHROMBIN TIME: CPT

## 2018-10-05 PROCEDURE — 99285 EMERGENCY DEPT VISIT HI MDM: CPT

## 2018-10-05 PROCEDURE — 83690 ASSAY OF LIPASE: CPT

## 2018-10-05 PROCEDURE — 83880 ASSAY OF NATRIURETIC PEPTIDE: CPT

## 2018-10-05 PROCEDURE — 93005 ELECTROCARDIOGRAM TRACING: CPT | Performed by: EMERGENCY MEDICINE

## 2018-10-05 RX ORDER — METOPROLOL TARTRATE 1 MG/ML
5 INJECTION, SOLUTION INTRAVENOUS ONCE
Status: COMPLETED | OUTPATIENT
Start: 2018-10-05 | End: 2018-10-05

## 2018-10-05 RX ADMIN — METOPROLOL TARTRATE 5 MG: 1 INJECTION, SOLUTION INTRAVENOUS at 13:09

## 2018-10-05 RX ADMIN — METOPROLOL TARTRATE 5 MG: 1 INJECTION, SOLUTION INTRAVENOUS at 11:21

## 2018-10-05 NOTE — DISCHARGE INSTRUCTIONS
Return to the emergency department for worsening chest pain, shortness of breath fever or other concerns.  Follow-up with your doctor.

## 2018-10-05 NOTE — ED PROVIDER NOTES
ED Provider Note    CHIEF COMPLAINT  Chief Complaint   Patient presents with   • Chest Pain     BIB EMS. Pt c/o chest pain x 1 day. Radiates from L arm to L jaw       HPI  Sebastián Castro is a 53 y.o. male who presents to the emergency department cleaning of chest pain.    Patient states he chest pain started this morning around 10 AM.  Patient started his chest on the left side radiates on the left arm.  Is an achy sensation.  It persists now.  Associate shortness of breath.  He also feels palpations and a rapid heartbeat.  Nothing specific makes it better or worse.  Reports being compliant with his medications including Xarelto.  He takes metoprolol to slow down his A. fib, he denies missing any doses.  Denies any chest pain cough shortness of breath fevers or chills.  Denies any other aggravating leaving factors or associated complaints.    REVIEW OF SYSTEMS  See HPI for further details. All other systems are negative.    PAST MEDICAL HISTORY  Past Medical History:   Diagnosis Date   • A-fib (HCC)    • Chest pain    • Diabetes (HCC)    • Diabetic neuropathy (HCC)    • Hypercholesterolemia    • Hypertension    • Morbid obesity (HCC)    • Noncompliance    • Normal cardiac stress test        FAMILY HISTORY  History reviewed. No pertinent family history.    SOCIAL HISTORY  Social History     Social History   • Marital status:      Spouse name: N/A   • Number of children: N/A   • Years of education: N/A     Social History Main Topics   • Smoking status: Never Smoker   • Smokeless tobacco: Never Used   • Alcohol use No   • Drug use: No   • Sexual activity: Not on file     Other Topics Concern   • Not on file     Social History Narrative   • No narrative on file       SURGICAL HISTORY  Past Surgical History:   Procedure Laterality Date   • CARDIAC CATH  07/21/2018    EF 45%, normal coronaries.   • IRRIGATION & DEBRIDEMENT ORTHO Right 2/19/2018    Procedure: IRRIGATION & DEBRIDEMENT ORTHO-FOOT;  Surgeon: Kirby GUILLORY  JEFFREY Lopez;  Location: SURGERY [facility];  Service: Orthopedics   • TOE AMPUTATION Right 1/17/2018    Procedure: TOE AMPUTATION-1ST RAY;  Surgeon: Doug Delong M.D.;  Location: SURGERY [facility];  Service: Orthopedics   • TOE AMPUTATION Right 11/10/2017    Procedure: TOE AMPUTATION;  Surgeon: Osorio Leo M.D.;  Location: SURGERY [facility];  Service: Orthopedics       CURRENT MEDICATIONS  Home Medications    **Home medications have not yet been reviewed for this encounter**         ALLERGIES  Allergies   Allergen Reactions   • Daptomycin Rash     Body rash  RXN=1/2018     • Pcn [Penicillins] Hives and Rash      Rash & hives  RXN=since a kid   • Unasyn [Ampicillin-Sulbactam Sodium] Hives, Itching and Swelling   • Vancomycin Rash     Red man syndrome       PHYSICAL EXAM  VITAL SIGNS: /75   Pulse (!) 101   Temp 36.9 °C (98.4 °F)   Resp (!) 10   Ht 1.829 m (6')   Wt (!) 133.4 kg (294 lb)   SpO2 97%   BMI 39.87 kg/m²    Constitutional: Well developed, Well nourished, No acute distress, Non-toxic appearance.   HENT: Normocephalic, Atraumatic, Bilateral external ears normal, Oropharynx moist, No oral exudates, Nose normal.   Eyes: PERRL, EOMI, Conjunctiva normal, No discharge.   Neck: Normal range of motion, No tenderness, Supple, No stridor.   Cardiovascular: Tachycardic and irregular irregular no murmurs rubs or gallops..   Thorax & Lungs: Normal breath sounds, No respiratory distress, No wheezing  Abdomen: Bowel sounds normal, Soft, No tenderness,   Skin: Warm, Dry, No erythema, No rash.   Musculoskeletal: Good range of motion in all major joints.  Severe bilateral lower extremity edema  Neurologic: Alert, No focal deficits noted.   Psychiatric: Affect normal    Results for orders placed or performed during the hospital encounter of 10/05/18   CBC w/ Differential   Result Value Ref Range    WBC 8.7 4.8 - 10.8 K/uL    RBC 4.63 (L) 4.70 - 6.10 M/uL    Hemoglobin 13.1 (L) 14.0 -  18.0 g/dL    Hematocrit 41.5 (L) 42.0 - 52.0 %    MCV 89.6 81.4 - 97.8 fL    MCH 28.3 27.0 - 33.0 pg    MCHC 31.6 (L) 33.7 - 35.3 g/dL    RDW 48.3 35.9 - 50.0 fL    Platelet Count 206 164 - 446 K/uL    MPV 10.5 9.0 - 12.9 fL    Neutrophils-Polys 65.10 44.00 - 72.00 %    Lymphocytes 25.10 22.00 - 41.00 %    Monocytes 5.90 0.00 - 13.40 %    Eosinophils 3.10 0.00 - 6.90 %    Basophils 0.60 0.00 - 1.80 %    Immature Granulocytes 0.20 0.00 - 0.90 %    Nucleated RBC 0.00 /100 WBC    Neutrophils (Absolute) 5.64 1.82 - 7.42 K/uL    Lymphs (Absolute) 2.18 1.00 - 4.80 K/uL    Monos (Absolute) 0.51 0.00 - 0.85 K/uL    Eos (Absolute) 0.27 0.00 - 0.51 K/uL    Baso (Absolute) 0.05 0.00 - 0.12 K/uL    Immature Granulocytes (abs) 0.02 0.00 - 0.11 K/uL    NRBC (Absolute) 0.00 K/uL   Complete Metabolic Panel (CMP)   Result Value Ref Range    Sodium 136 135 - 145 mmol/L    Potassium 4.4 3.6 - 5.5 mmol/L    Chloride 103 96 - 112 mmol/L    Co2 24 20 - 33 mmol/L    Anion Gap 9.0 0.0 - 11.9    Glucose 105 (H) 65 - 99 mg/dL    Bun 30 (H) 8 - 22 mg/dL    Creatinine 0.86 0.50 - 1.40 mg/dL    Calcium 9.5 8.5 - 10.5 mg/dL    AST(SGOT) 20 12 - 45 U/L    ALT(SGPT) 18 2 - 50 U/L    Alkaline Phosphatase 83 30 - 99 U/L    Total Bilirubin 0.6 0.1 - 1.5 mg/dL    Albumin 3.6 3.2 - 4.9 g/dL    Total Protein 6.8 6.0 - 8.2 g/dL    Globulin 3.2 1.9 - 3.5 g/dL    A-G Ratio 1.1 g/dL   Troponin STAT   Result Value Ref Range    Troponin I <0.01 0.00 - 0.04 ng/mL   Lipase   Result Value Ref Range    Lipase 20 11 - 82 U/L   APTT   Result Value Ref Range    APTT 30.5 24.7 - 36.0 sec   PT/INR   Result Value Ref Range    PT 13.6 12.0 - 14.6 sec    INR 1.03 0.87 - 1.13   ESTIMATED GFR   Result Value Ref Range    GFR If African American >60 >60 mL/min/1.73 m 2    GFR If Non African American >60 >60 mL/min/1.73 m 2   EKG   Result Value Ref Range    Report       [facility] Emergency Dept.    Test Date:  2018-10-05  Pt Name:    EMELY FLORES                 Department: ER  MRN:        [id number]                      Room:       RD 03  Gender:     Male                         Technician: [id number]  :        1965                   Requested By:ER TRIAGE PROTOCOL  Order #:    [id number]                    Reading MD:    Measurements  Intervals                                Axis  Rate:       129                          P:  PA:                                      QRS:        -15  QRSD:       92                           T:          47  QT:         324  QTc:        475    Interpretive Statements  ATRIAL FIBRILLATION, V-RATE    BORDERLINE LEFT AXIS DEVIATION  LOW VOLTAGE IN FRONTAL LEADS  BORDERLINE R WAVE PROGRESSION, ANTERIOR LEADS  Compared to ECG 2018 14:10:36  No significant changes          RADIOLOGY/PROCEDURES  DX-CHEST-PORTABLE (1 VIEW)   Final Result      Stable mild cardiomegaly.        *    COURSE & MEDICAL DECISION MAKING  Pertinent Labs & Imaging studies reviewed. (See chart for details)  The patient presents with chest discomfort and atrial fibrillation.  He has a known history of chronic A. fib and chronically takes anticoagulation.    Reports being compliant with his anticoagulants as well as his beta-blockers.  He is in rapid A. fib today.  The patient having some chest pain.  His EKG does not show STEMI.  Review of his chart shows a recent angiogram that shows clean coronary arteries.  First troponin is negative as is a several hour later delta troponin.  Consulted Dr. Fernandez from cardiology who saw the patient and suggest we discharge him home.    The patient's rate was still in the 110s 120s after 5 mg of metoprolol IV.  He is observed.  He is given a second IV dose of metoprolol and now his heart 90s 200s.  I have observed him in the room myself at the bedside.  His vital signs are normal.  His blood pressures been in the 120s his heart rates been mostly in the 90s occasionally creeps up but is not persistently high.  His saturation  is 96.  The patient states he feels better and he wants to go.    For his chest discomfort I have considered a broad differential diagnosis.  I do not think it is a PE.  He chronically takes anticoagulation.  He does not have a pleuritic component he is not dyspneic, and his A. fib tachycardia are chronic.  Reports not missing any doses of his anticoagulation.  This is not ACS.  He has clean coronaries recent cath 2- troponins and unremarkable EKG.  No evidence of pneumonia, pneumothorax or hemothorax on chest x-ray.  Clinical history is nauseous of a dissection.  After period of observation the patient is feeling better.  Would like to leave.  At this point I do not see indication for further workup or admission.    I did not apply the PERC criteria for PE because he is chronically tachycardic with A. fib.  The patient has had these symptoms in the past.  He is discharged in good condition to follow-up with his primary care doctor.      The patient was noted to have elevated blood pressure while in the ER and was counseled to see their doctor within one wee to have this rechecked.    Jerad Lomax M.D.  [address] [place] [id number]  [phone]              FINAL IMPRESSION  1. Other chest pain    2. Chronic atrial fibrillation (HCC)        2.   3.         Electronically signed by: Brian Courtney, 10/5/2018 12:10 PM

## 2018-10-07 ENCOUNTER — APPOINTMENT (OUTPATIENT)
Dept: RADIOLOGY | Facility: MEDICAL CENTER | Age: 53
End: 2018-10-07
Attending: EMERGENCY MEDICINE
Payer: MEDICAID

## 2018-10-07 ENCOUNTER — HOSPITAL ENCOUNTER (EMERGENCY)
Facility: MEDICAL CENTER | Age: 53
End: 2018-10-07
Attending: EMERGENCY MEDICINE
Payer: MEDICAID

## 2018-10-07 VITALS
OXYGEN SATURATION: 98 % | HEIGHT: 72 IN | HEART RATE: 98 BPM | WEIGHT: 297 LBS | DIASTOLIC BLOOD PRESSURE: 90 MMHG | SYSTOLIC BLOOD PRESSURE: 163 MMHG | TEMPERATURE: 98.6 F | RESPIRATION RATE: 15 BRPM | BODY MASS INDEX: 40.23 KG/M2

## 2018-10-07 DIAGNOSIS — I48.0 PAROXYSMAL ATRIAL FIBRILLATION (HCC): ICD-10-CM

## 2018-10-07 DIAGNOSIS — R07.9 CHEST PAIN, UNSPECIFIED TYPE: ICD-10-CM

## 2018-10-07 DIAGNOSIS — R60.9 PERIPHERAL EDEMA: ICD-10-CM

## 2018-10-07 LAB
ALBUMIN SERPL BCP-MCNC: 3.5 G/DL (ref 3.2–4.9)
ALBUMIN/GLOB SERPL: 1 G/DL
ALP SERPL-CCNC: 85 U/L (ref 30–99)
ALT SERPL-CCNC: 18 U/L (ref 2–50)
ANION GAP SERPL CALC-SCNC: 7 MMOL/L (ref 0–11.9)
APTT PPP: 30.7 SEC (ref 24.7–36)
AST SERPL-CCNC: 19 U/L (ref 12–45)
BASOPHILS # BLD AUTO: 0.6 % (ref 0–1.8)
BASOPHILS # BLD: 0.05 K/UL (ref 0–0.12)
BILIRUB SERPL-MCNC: 0.5 MG/DL (ref 0.1–1.5)
BNP SERPL-MCNC: 144 PG/ML (ref 0–100)
BUN SERPL-MCNC: 29 MG/DL (ref 8–22)
CALCIUM SERPL-MCNC: 9.5 MG/DL (ref 8.5–10.5)
CHLORIDE SERPL-SCNC: 104 MMOL/L (ref 96–112)
CO2 SERPL-SCNC: 27 MMOL/L (ref 20–33)
CREAT SERPL-MCNC: 0.7 MG/DL (ref 0.5–1.4)
EKG IMPRESSION: NORMAL
EOSINOPHIL # BLD AUTO: 0.45 K/UL (ref 0–0.51)
EOSINOPHIL NFR BLD: 5.2 % (ref 0–6.9)
ERYTHROCYTE [DISTWIDTH] IN BLOOD BY AUTOMATED COUNT: 48.4 FL (ref 35.9–50)
GLOBULIN SER CALC-MCNC: 3.5 G/DL (ref 1.9–3.5)
GLUCOSE SERPL-MCNC: 125 MG/DL (ref 65–99)
HCT VFR BLD AUTO: 39.7 % (ref 42–52)
HGB BLD-MCNC: 13 G/DL (ref 14–18)
IMM GRANULOCYTES # BLD AUTO: 0.03 K/UL (ref 0–0.11)
IMM GRANULOCYTES NFR BLD AUTO: 0.3 % (ref 0–0.9)
INR PPP: 1.08 (ref 0.87–1.13)
LIPASE SERPL-CCNC: 22 U/L (ref 11–82)
LYMPHOCYTES # BLD AUTO: 2.59 K/UL (ref 1–4.8)
LYMPHOCYTES NFR BLD: 29.7 % (ref 22–41)
MCH RBC QN AUTO: 28.7 PG (ref 27–33)
MCHC RBC AUTO-ENTMCNC: 32.7 G/DL (ref 33.7–35.3)
MCV RBC AUTO: 87.6 FL (ref 81.4–97.8)
MONOCYTES # BLD AUTO: 0.69 K/UL (ref 0–0.85)
MONOCYTES NFR BLD AUTO: 7.9 % (ref 0–13.4)
NEUTROPHILS # BLD AUTO: 4.91 K/UL (ref 1.82–7.42)
NEUTROPHILS NFR BLD: 56.3 % (ref 44–72)
NRBC # BLD AUTO: 0 K/UL
NRBC BLD-RTO: 0 /100 WBC
PLATELET # BLD AUTO: 217 K/UL (ref 164–446)
PMV BLD AUTO: 10.7 FL (ref 9–12.9)
POTASSIUM SERPL-SCNC: 4 MMOL/L (ref 3.6–5.5)
PROT SERPL-MCNC: 7 G/DL (ref 6–8.2)
PROTHROMBIN TIME: 14.1 SEC (ref 12–14.6)
RBC # BLD AUTO: 4.53 M/UL (ref 4.7–6.1)
SODIUM SERPL-SCNC: 138 MMOL/L (ref 135–145)
TROPONIN I SERPL-MCNC: <0.01 NG/ML (ref 0–0.04)
TROPONIN I SERPL-MCNC: <0.01 NG/ML (ref 0–0.04)
WBC # BLD AUTO: 8.7 K/UL (ref 4.8–10.8)

## 2018-10-07 PROCEDURE — 83880 ASSAY OF NATRIURETIC PEPTIDE: CPT

## 2018-10-07 PROCEDURE — 700102 HCHG RX REV CODE 250 W/ 637 OVERRIDE(OP): Performed by: EMERGENCY MEDICINE

## 2018-10-07 PROCEDURE — 99285 EMERGENCY DEPT VISIT HI MDM: CPT

## 2018-10-07 PROCEDURE — 83690 ASSAY OF LIPASE: CPT

## 2018-10-07 PROCEDURE — 84484 ASSAY OF TROPONIN QUANT: CPT

## 2018-10-07 PROCEDURE — 85025 COMPLETE CBC W/AUTO DIFF WBC: CPT

## 2018-10-07 PROCEDURE — 93005 ELECTROCARDIOGRAM TRACING: CPT | Performed by: EMERGENCY MEDICINE

## 2018-10-07 PROCEDURE — 71045 X-RAY EXAM CHEST 1 VIEW: CPT

## 2018-10-07 PROCEDURE — 85610 PROTHROMBIN TIME: CPT

## 2018-10-07 PROCEDURE — 80053 COMPREHEN METABOLIC PANEL: CPT

## 2018-10-07 PROCEDURE — A9270 NON-COVERED ITEM OR SERVICE: HCPCS | Performed by: EMERGENCY MEDICINE

## 2018-10-07 PROCEDURE — 36415 COLL VENOUS BLD VENIPUNCTURE: CPT

## 2018-10-07 PROCEDURE — 85730 THROMBOPLASTIN TIME PARTIAL: CPT

## 2018-10-07 RX ORDER — FUROSEMIDE 40 MG/1
40 TABLET ORAL ONCE
Status: COMPLETED | OUTPATIENT
Start: 2018-10-07 | End: 2018-10-07

## 2018-10-07 RX ADMIN — FUROSEMIDE 40 MG: 40 TABLET ORAL at 05:29

## 2018-10-07 ASSESSMENT — PAIN SCALES - GENERAL: PAINLEVEL_OUTOF10: 5

## 2018-10-07 NOTE — ED NOTES
Pt verbalized understanding of discharge and follow up instructions. PIV removed, site bandaged.  VSS.  All questions answered, ambulates with steady gait to discharge.

## 2018-10-07 NOTE — ED TRIAGE NOTES
Chief Complaint   Patient presents with   • Chest Pain     2 weeks    • Irregular Heart Beat     Afib RVR     Blood pressure (!) 163/90, pulse (!) 118, temperature 37 °C (98.6 °F), resp. rate 18, height 1.829 m (6'), weight (!) 134.7 kg (297 lb), SpO2 98 %.    [facility] c/o intermittent chest pain x2 weeks. Pt was seen for the same symptoms x2 weeks ago. EMS report Afib RVR on the monitor, , +4 pitting edema on LE bilaterally.

## 2018-10-07 NOTE — ED PROVIDER NOTES
ED Provider Note    ED Provider Note      Primary care provider: Jerad Lomax M.D.    CHIEF COMPLAINT  Chief Complaint   Patient presents with   • Chest Pain     2 weeks    • Irregular Heart Beat     Afib RVR       HPI  Sebastián Castro is a 53 y.o. male who presents to the Emergency Department with chief complaint of chest pain.  Patient also reports some palpitation lower extremity edema.  States a stabbing type pain in the mid chest that does not go anywhere else in the body he is noted no alleviating or aggravating factors.  Patient reports that he has been having to sleep in a chair recently.  He reports no proximal small nocturnal dyspnea no orthopnea he has had no fevers no chills no cough no congestion no abdominal pain no headache altered mental status he denies nausea or diaphoresis this evening.    REVIEW OF SYSTEMS  10 systems reviewed and otherwise negative, pertinent positives and negatives listed in the history of present illness.    PAST MEDICAL HISTORY   has a past medical history of A-fib (HCC); Chest pain; Diabetes (HCC); Diabetic neuropathy (HCC); Hypercholesterolemia; Hypertension; Morbid obesity (HCC); Noncompliance; and Normal cardiac stress test.    SURGICAL HISTORY   has a past surgical history that includes toe amputation (Right, 11/10/2017); toe amputation (Right, 1/17/2018); irrigation & debridement ortho (Right, 2/19/2018); and cardiac cath (07/21/2018).    SOCIAL HISTORY  Social History   Substance Use Topics   • Smoking status: Never Smoker   • Smokeless tobacco: Never Used   • Alcohol use No      History   Drug Use No       FAMILY HISTORY  Non-Contributory    CURRENT MEDICATIONS  Home Medications    **Home medications have not yet been reviewed for this encounter**         ALLERGIES  Allergies   Allergen Reactions   • Daptomycin Rash     Body rash  RXN=1/2018     • Pcn [Penicillins] Hives and Rash      Rash & hives  RXN=since a kid   • Unasyn [Ampicillin-Sulbactam Sodium] Hives, Itching  and Swelling   • Vancomycin Rash     Red man syndrome       PHYSICAL EXAM  VITAL SIGNS: BP (!) 163/90   Pulse (!) 109   Temp 37 °C (98.6 °F)   Resp 17   Ht 1.829 m (6')   Wt (!) 134.7 kg (297 lb)   SpO2 97%   BMI 40.28 kg/m²   Pulse ox interpretation: I interpret this pulse ox as normal.  Constitutional: Alert and oriented x 3, minimal distress  HEENT: Atraumatic normocephalic, pupils are equal round reactive to light extraocular movements are intact. The nares is clear, external ears are normal, mouth shows moist mucous membranes  Neck: Supple, no JVD no tracheal deviation  Cardiovascular: Irregularly irregular borderline tachycardic no murmur rub or gallop 2+ pulses peripherally x4  Thorax & Lungs: No respiratory distress, no wheezes rales or rhonchi, No chest tenderness.   GI: Soft nontender nondistended positive bowel sounds, no peritoneal signs  Skin: Warm dry no acute rash or lesion  Musculoskeletal: Moving all extremities with full range and 5 of 5 strength, no acute  deformity, 2+ pitting edema to the midshin bilaterally  Neurologic: Cranial nerves III through XII are grossly intact, no sensory deficit, no cerebellar dysfunction   Psychiatric: Appropriate affect for situation at this time      DIAGNOSTIC STUDIES / PROCEDURES  LABS      Results for orders placed or performed during the hospital encounter of 10/07/18   Troponin   Result Value Ref Range    Troponin I <0.01 0.00 - 0.04 ng/mL   Btype Natriuretic Peptide   Result Value Ref Range    B Natriuretic Peptide 144 (H) 0 - 100 pg/mL   CBC with Differential   Result Value Ref Range    WBC 8.7 4.8 - 10.8 K/uL    RBC 4.53 (L) 4.70 - 6.10 M/uL    Hemoglobin 13.0 (L) 14.0 - 18.0 g/dL    Hematocrit 39.7 (L) 42.0 - 52.0 %    MCV 87.6 81.4 - 97.8 fL    MCH 28.7 27.0 - 33.0 pg    MCHC 32.7 (L) 33.7 - 35.3 g/dL    RDW 48.4 35.9 - 50.0 fL    Platelet Count 217 164 - 446 K/uL    MPV 10.7 9.0 - 12.9 fL    Neutrophils-Polys 56.30 44.00 - 72.00 %    Lymphocytes  29.70 22.00 - 41.00 %    Monocytes 7.90 0.00 - 13.40 %    Eosinophils 5.20 0.00 - 6.90 %    Basophils 0.60 0.00 - 1.80 %    Immature Granulocytes 0.30 0.00 - 0.90 %    Nucleated RBC 0.00 /100 WBC    Neutrophils (Absolute) 4.91 1.82 - 7.42 K/uL    Lymphs (Absolute) 2.59 1.00 - 4.80 K/uL    Monos (Absolute) 0.69 0.00 - 0.85 K/uL    Eos (Absolute) 0.45 0.00 - 0.51 K/uL    Baso (Absolute) 0.05 0.00 - 0.12 K/uL    Immature Granulocytes (abs) 0.03 0.00 - 0.11 K/uL    NRBC (Absolute) 0.00 K/uL   Complete Metabolic Panel (CMP)   Result Value Ref Range    Sodium 138 135 - 145 mmol/L    Potassium 4.0 3.6 - 5.5 mmol/L    Chloride 104 96 - 112 mmol/L    Co2 27 20 - 33 mmol/L    Anion Gap 7.0 0.0 - 11.9    Glucose 125 (H) 65 - 99 mg/dL    Bun 29 (H) 8 - 22 mg/dL    Creatinine 0.70 0.50 - 1.40 mg/dL    Calcium 9.5 8.5 - 10.5 mg/dL    AST(SGOT) 19 12 - 45 U/L    ALT(SGPT) 18 2 - 50 U/L    Alkaline Phosphatase 85 30 - 99 U/L    Total Bilirubin 0.5 0.1 - 1.5 mg/dL    Albumin 3.5 3.2 - 4.9 g/dL    Total Protein 7.0 6.0 - 8.2 g/dL    Globulin 3.5 1.9 - 3.5 g/dL    A-G Ratio 1.0 g/dL   Prothrombin Time   Result Value Ref Range    PT 14.1 12.0 - 14.6 sec    INR 1.08 0.87 - 1.13   APTT   Result Value Ref Range    APTT 30.7 24.7 - 36.0 sec   Lipase   Result Value Ref Range    Lipase 22 11 - 82 U/L   ESTIMATED GFR   Result Value Ref Range    GFR If African American >60 >60 mL/min/1.73 m 2    GFR If Non African American >60 >60 mL/min/1.73 m 2   TROPONIN   Result Value Ref Range    Troponin I <0.01 0.00 - 0.04 ng/mL   EKG   Result Value Ref Range    Report       [facility] Emergency Dept.    Test Date:  2018-10-07  Pt Name:    EMELY FLORES                Department: ER  MRN:        [id number]                      Room:       [facility]  Gender:     Male                         Technician: [id number]  :        1965                   Requested By:ER TRIAGE PROTOCOL  Order #:    [id number]                    Luís MD: KAM  AME ARBOLEDA MD    Measurements  Intervals                                Axis  Rate:       109                          P:  IN:                                      QRS:        3  QRSD:       98                           T:          60  QT:         340  QTc:        458    Interpretive Statements  ATRIAL FIBRILLATION, V-RATE    LOW VOLTAGE IN FRONTAL LEADS  BORDERLINE R WAVE PROGRESSION, ANTERIOR LEADS  BORDERLINE T ABNORMALITIES, ANT-LAT LEADS  ARTIFACT IN LEAD(S) I,II,III,aVR,aVL,aVF,V1,V2  Compared to ECG 10/05/2018 11:06:45  T-wave abnormality now present    Electronically Signed On 10-7-2018 7:23:2 9 PDT by KAM ARBOLEDA MD         All labs reviewed by me.      RADIOLOGY  DX-CHEST-LIMITED (1 VIEW)   Final Result      Stable mild cardiomegaly        The radiologist's interpretation of all radiological studies have been reviewed by me.    COURSE & MEDICAL DECISION MAKING  Pertinent Labs & Imaging studies reviewed. (See chart for details)    2:04 AM - Patient seen and examined at bedside.         Patient noted to have slightly elevated blood pressure likely circumstantial secondary to presenting complaint. Referred to primary care physician for further evaluation.      Medical Decision Making: Slightly tachycardic at arrival that resolved without intervention.  Chronic A. fib RVR.  Initial troponin negative chest x-ray stable BNP very minimally elevated.  Patient continued to have decrease in heart rate while resting in the emergency department 3-hour delta troponin unremarkable.  Review of patient's chart shows multiple visits to our emergency department within the last couple months and extensive cardiac and noncardiac workup.  I considered CT of the chest to evaluate his thoracic aorta however this is been done recently she had a cardiac echocardiogram recently nuclear med stress test.  He is established with Dr. Rodolfo vanessa and has follow-up appointment scheduled.  Patient given an additional dose  of Lasix here tonight for lower extremity edema social work contacted the homeless shelter to assure that the patient would have a bed this evening so that he could elevate his legs.  Otherwise patient knows to return should he have any worsening chest pain shortness breath any other acute symptoms or concerns otherwise discharged in stable condition.    BP (!) 163/90   Pulse 98   Temp 37 °C (98.6 °F)   Resp 15   Ht 1.829 m (6')   Wt (!) 134.7 kg (297 lb)   SpO2 98%   BMI 40.28 kg/m²       [facility]  [address] [facility], [address]  [facility] [address] [phone]  Schedule an appointment as soon as possible for a visit       [facility], Emergency Dept  [address] [address] [phone]    If symptoms worsen        FINAL IMPRESSION  1. Paroxysmal atrial fibrillation (HCC) Active   2. Chest pain, unspecified type Active   3. Peripheral edema Active       This dictation has been created using voice recognition software and/or scribes. The accuracy of the dictation is limited by the abilities of the software and the expertise of the scribes. I expect there may be some errors of grammar and possibly content. I made every attempt to manually correct the errors within my dictation. However, errors related to voice recognition software and/or scribes may still exist and should be interpreted within the appropriate context.

## 2018-10-07 NOTE — DISCHARGE PLANNING
Medical Social Work     The ERP requested SW assistance in getting the pt a bed at the shelter for tonight because of the pt medical condition. LUNA called the [facility] at [phone] and spoke with Shirlene and advised her of the pt situation. Shirlene took the pt name and stated she is going to try to accommodate   the pt with a bed if not they would try to accomodates him for his medical status.

## 2018-10-08 ENCOUNTER — PATIENT OUTREACH (OUTPATIENT)
Dept: HEALTH INFORMATION MANAGEMENT | Facility: OTHER | Age: 53
End: 2018-10-08

## 2018-10-08 NOTE — PROGRESS NOTES
Placed discharge outreach phone call to pt s/p ER discharge 10/7/18.  Left voicemail providing my contact information and instructions to call with any questions or concerns.

## 2018-10-09 ENCOUNTER — OFFICE VISIT (OUTPATIENT)
Dept: CARDIOLOGY | Facility: MEDICAL CENTER | Age: 53
End: 2018-10-09
Payer: MEDICAID

## 2018-10-09 VITALS
HEART RATE: 120 BPM | DIASTOLIC BLOOD PRESSURE: 80 MMHG | BODY MASS INDEX: 36.57 KG/M2 | SYSTOLIC BLOOD PRESSURE: 119 MMHG | WEIGHT: 270 LBS | OXYGEN SATURATION: 93 % | HEIGHT: 72 IN

## 2018-10-09 DIAGNOSIS — E11.00 TYPE 2 DIABETES MELLITUS WITH HYPEROSMOLARITY WITHOUT COMA, WITHOUT LONG-TERM CURRENT USE OF INSULIN (HCC): ICD-10-CM

## 2018-10-09 DIAGNOSIS — Z79.01 ON CONTINUOUS ORAL ANTICOAGULATION: ICD-10-CM

## 2018-10-09 DIAGNOSIS — I10 ESSENTIAL HYPERTENSION: ICD-10-CM

## 2018-10-09 DIAGNOSIS — E78.2 MIXED HYPERLIPIDEMIA: ICD-10-CM

## 2018-10-09 DIAGNOSIS — I48.0 PAROXYSMAL A-FIB (HCC): ICD-10-CM

## 2018-10-09 DIAGNOSIS — Z91.199 NONCOMPLIANCE: ICD-10-CM

## 2018-10-09 PROCEDURE — 99215 OFFICE O/P EST HI 40 MIN: CPT | Performed by: INTERNAL MEDICINE

## 2018-10-09 RX ORDER — AMIODARONE HYDROCHLORIDE 200 MG/1
200 TABLET ORAL 2 TIMES DAILY
Qty: 60 TAB | Refills: 11 | Status: ON HOLD | OUTPATIENT
Start: 2018-10-09 | End: 2018-12-18

## 2018-10-09 RX ORDER — PRAVASTATIN SODIUM 20 MG
20 TABLET ORAL DAILY
Qty: 90 TAB | Refills: 3 | Status: ON HOLD | OUTPATIENT
Start: 2018-10-09 | End: 2019-02-27

## 2018-10-10 NOTE — ADDENDUM NOTE
Encounter addended by: Galileo Rodriguez M.D. on: 10/10/2018  2:17 PM<BR>    Actions taken: Charge Capture section accepted

## 2018-10-13 ENCOUNTER — APPOINTMENT (OUTPATIENT)
Dept: RADIOLOGY | Facility: MEDICAL CENTER | Age: 53
End: 2018-10-13
Attending: EMERGENCY MEDICINE
Payer: MEDICAID

## 2018-10-13 ENCOUNTER — HOSPITAL ENCOUNTER (EMERGENCY)
Facility: MEDICAL CENTER | Age: 53
End: 2018-10-13
Attending: EMERGENCY MEDICINE
Payer: MEDICAID

## 2018-10-13 VITALS
WEIGHT: 270 LBS | RESPIRATION RATE: 16 BRPM | TEMPERATURE: 97.7 F | DIASTOLIC BLOOD PRESSURE: 73 MMHG | OXYGEN SATURATION: 98 % | HEIGHT: 72 IN | BODY MASS INDEX: 36.57 KG/M2 | SYSTOLIC BLOOD PRESSURE: 129 MMHG | HEART RATE: 93 BPM

## 2018-10-13 DIAGNOSIS — B37.2 CANDIDAL INTERTRIGO: ICD-10-CM

## 2018-10-13 DIAGNOSIS — I48.20 CHRONIC ATRIAL FIBRILLATION (HCC): ICD-10-CM

## 2018-10-13 DIAGNOSIS — R60.0 BILATERAL LOWER EXTREMITY EDEMA: ICD-10-CM

## 2018-10-13 LAB
ALBUMIN SERPL BCP-MCNC: 3.4 G/DL (ref 3.2–4.9)
ALBUMIN/GLOB SERPL: 1.1 G/DL
ALP SERPL-CCNC: 82 U/L (ref 30–99)
ALT SERPL-CCNC: 23 U/L (ref 2–50)
ANION GAP SERPL CALC-SCNC: 10 MMOL/L (ref 0–11.9)
AST SERPL-CCNC: 25 U/L (ref 12–45)
BASOPHILS # BLD AUTO: 0.5 % (ref 0–1.8)
BASOPHILS # BLD: 0.04 K/UL (ref 0–0.12)
BILIRUB SERPL-MCNC: 1.1 MG/DL (ref 0.1–1.5)
BNP SERPL-MCNC: 125 PG/ML (ref 0–100)
BUN SERPL-MCNC: 30 MG/DL (ref 8–22)
CALCIUM SERPL-MCNC: 9.2 MG/DL (ref 8.5–10.5)
CHLORIDE SERPL-SCNC: 103 MMOL/L (ref 96–112)
CO2 SERPL-SCNC: 24 MMOL/L (ref 20–33)
CREAT SERPL-MCNC: 0.71 MG/DL (ref 0.5–1.4)
EKG IMPRESSION: NORMAL
EOSINOPHIL # BLD AUTO: 0.28 K/UL (ref 0–0.51)
EOSINOPHIL NFR BLD: 3.2 % (ref 0–6.9)
ERYTHROCYTE [DISTWIDTH] IN BLOOD BY AUTOMATED COUNT: 45.8 FL (ref 35.9–50)
GLOBULIN SER CALC-MCNC: 3.2 G/DL (ref 1.9–3.5)
GLUCOSE SERPL-MCNC: 119 MG/DL (ref 65–99)
HCT VFR BLD AUTO: 37.5 % (ref 42–52)
HGB BLD-MCNC: 12.4 G/DL (ref 14–18)
IMM GRANULOCYTES # BLD AUTO: 0.03 K/UL (ref 0–0.11)
IMM GRANULOCYTES NFR BLD AUTO: 0.3 % (ref 0–0.9)
LACTATE BLD-SCNC: 1.3 MMOL/L (ref 0.5–2)
LYMPHOCYTES # BLD AUTO: 2.17 K/UL (ref 1–4.8)
LYMPHOCYTES NFR BLD: 24.5 % (ref 22–41)
MAGNESIUM SERPL-MCNC: 1.9 MG/DL (ref 1.5–2.5)
MCH RBC QN AUTO: 29.3 PG (ref 27–33)
MCHC RBC AUTO-ENTMCNC: 33.1 G/DL (ref 33.7–35.3)
MCV RBC AUTO: 88.7 FL (ref 81.4–97.8)
MONOCYTES # BLD AUTO: 0.85 K/UL (ref 0–0.85)
MONOCYTES NFR BLD AUTO: 9.6 % (ref 0–13.4)
NEUTROPHILS # BLD AUTO: 5.5 K/UL (ref 1.82–7.42)
NEUTROPHILS NFR BLD: 61.9 % (ref 44–72)
NRBC # BLD AUTO: 0 K/UL
NRBC BLD-RTO: 0 /100 WBC
PLATELET # BLD AUTO: 229 K/UL (ref 164–446)
PMV BLD AUTO: 10.7 FL (ref 9–12.9)
POTASSIUM SERPL-SCNC: 4 MMOL/L (ref 3.6–5.5)
PROT SERPL-MCNC: 6.6 G/DL (ref 6–8.2)
RBC # BLD AUTO: 4.23 M/UL (ref 4.7–6.1)
SODIUM SERPL-SCNC: 137 MMOL/L (ref 135–145)
TROPONIN I SERPL-MCNC: <0.01 NG/ML (ref 0–0.04)
TSH SERPL DL<=0.005 MIU/L-ACNC: 5.1 UIU/ML (ref 0.38–5.33)
WBC # BLD AUTO: 8.9 K/UL (ref 4.8–10.8)

## 2018-10-13 PROCEDURE — 99284 EMERGENCY DEPT VISIT MOD MDM: CPT

## 2018-10-13 PROCEDURE — 36415 COLL VENOUS BLD VENIPUNCTURE: CPT

## 2018-10-13 PROCEDURE — 71046 X-RAY EXAM CHEST 2 VIEWS: CPT

## 2018-10-13 PROCEDURE — 83880 ASSAY OF NATRIURETIC PEPTIDE: CPT

## 2018-10-13 PROCEDURE — 83735 ASSAY OF MAGNESIUM: CPT

## 2018-10-13 PROCEDURE — 85025 COMPLETE CBC W/AUTO DIFF WBC: CPT

## 2018-10-13 PROCEDURE — 93005 ELECTROCARDIOGRAM TRACING: CPT

## 2018-10-13 PROCEDURE — 93005 ELECTROCARDIOGRAM TRACING: CPT | Performed by: EMERGENCY MEDICINE

## 2018-10-13 PROCEDURE — 80053 COMPREHEN METABOLIC PANEL: CPT

## 2018-10-13 PROCEDURE — 84484 ASSAY OF TROPONIN QUANT: CPT

## 2018-10-13 PROCEDURE — 84443 ASSAY THYROID STIM HORMONE: CPT

## 2018-10-13 PROCEDURE — 83605 ASSAY OF LACTIC ACID: CPT

## 2018-10-13 RX ORDER — CLOTRIMAZOLE 1 %
1 CREAM (GRAM) TOPICAL 2 TIMES DAILY
Qty: 1 TUBE | Refills: 0 | Status: SHIPPED | OUTPATIENT
Start: 2018-10-13 | End: 2018-10-22

## 2018-10-13 ASSESSMENT — PAIN SCALES - GENERAL: PAINLEVEL_OUTOF10: 6

## 2018-10-13 NOTE — ED NOTES
Pt has large, red, rash under abdomen. Pt also has 2 rashes located under knee bilaterally. Pt states dx with a yeast infection.

## 2018-10-13 NOTE — ED PROVIDER NOTES
ED Provider Note    CHIEF COMPLAINT  Chief Complaint   Patient presents with   • Leg Swelling        [facility]    Primary care provider: Jerad Lomax M.D.   History obtained from: Patient  History limited by: None     Sebastián Castro is a 53 y.o. male who [facility] the ED by [facility] the [facility] complaining of worsening bilateral lower extremity swelling.  Patient states that this has been worsening over the past week.  He has been taking his medications as prescribed.  He is also on Xarelto for his chronic atrial fibrillation.  Patient denies any significant pain.  He has noticed some rashes in the creases behind both knees as well as in the skin fold of his abdomen.  He denies any known fever.  He reports slight shortness of breath at times.  He reports that his heart rate has been fast due to his atrial fibrillation.  He denies nausea/vomiting/diarrhea/constipation/dysuria.  There has been no recent injury or trauma.      REVIEW OF SYSTEMS  Please see HPI for pertinent positives/negatives.  All other systems reviewed and are negative.     PAST MEDICAL HISTORY  Past Medical History:   Diagnosis Date   • A-fib (HCC)    • Chest pain    • Diabetes (HCC)    • Diabetic neuropathy (HCC)    • Hypercholesterolemia    • Hypertension    • Morbid obesity (HCC)    • Noncompliance    • Normal cardiac stress test         SURGICAL HISTORY  Past Surgical History:   Procedure Laterality Date   • CARDIAC CATH  07/21/2018    EF 45%, normal coronaries.   • IRRIGATION & DEBRIDEMENT ORTHO Right 2/19/2018    Procedure: IRRIGATION & DEBRIDEMENT ORTHO-FOOT;  Surgeon: Kirby Lopez M.D.;  Location: [facility];  Service: Orthopedics   • TOE AMPUTATION Right 1/17/2018    Procedure: TOE AMPUTATION-1ST RAY;  Surgeon: Doug Delong M.D.;  Location: [facility];  Service: Orthopedics   • TOE AMPUTATION Right 11/10/2017    Procedure: TOE AMPUTATION;  Surgeon: Osorio Leo M.D.;  Location: [facility]  [facility];  Service: Orthopedics        SOCIAL HISTORY  Social History     Social History Main Topics   • Smoking status: Never Smoker   • Smokeless tobacco: Never Used   • Alcohol use No   • Drug use: No   • Sexual activity: Not on file        FAMILY HISTORY  No family history on file.     CURRENT MEDICATIONS  Home Medications     Reviewed by Makeda Castañeda R.N. (Registered Nurse) on 10/13/18 at 0519  Med List Status: <None>   Medication Last Dose Status   amiodarone (CORDARONE) 200 MG Tab  Active   metoprolol (LOPRESSOR) 25 MG Tab  Active   pravastatin (PRAVACHOL) 20 MG Tab  Active   rivaroxaban (XARELTO) 20 MG Tab tablet  Active                 ALLERGIES  Allergies   Allergen Reactions   • Daptomycin Rash     Body rash  RXN=1/2018     • Pcn [Penicillins] Hives and Rash      Rash & hives  RXN=since a kid   • Unasyn [Ampicillin-Sulbactam Sodium] Hives, Itching and Swelling   • Vancomycin Rash     Red man syndrome        PHYSICAL EXAM  VITAL SIGNS: /73   Pulse 93   Temp 36.5 °C (97.7 °F)   Resp 16   Ht 1.829 m (6')   Wt 122.5 kg (270 lb)   SpO2 98%   BMI 36.62 kg/m²  @IGNACIA[[id number]::@     Pulse ox interpretation: 98% I interpret this pulse ox as normal     Constitutional: Well developed, well nourished, alert in no apparent distress, nontoxic appearance    HENT: No external signs of trauma, normocephalic, oropharynx moist and clear, nose normal    Eyes: PERRL, conjunctiva without erythema, no discharge, no icterus    Neck: Soft and supple, trachea midline, no stridor, no tenderness, no LAD, no JVD, good ROM    Cardiovascular: Irregular irregular, no murmurs/rubs/gallops, good distal perfusion    Thorax & Lungs: No respiratory distress, CTAB   Abdomen: Soft, nontender, nondistended, no guarding, no rebound, normal BS    Back: No CVAT    Extremities: No cyanosis, bilateral lower extremity pitting edema, no gross deformity, right great toe status post amputation, good ROM, no tenderness, intact distal  pulses with brisk cap refill    Skin: Warm, dry, no pallor/cyanosis, erythema noted in the creases behind both knees as well as in the folds of the abdominal wall, no petechiae/purpura/blisters/vesicles/streaking/drainage/crepitus/fluctuance  Lymphatic: No lymphadenopathy noted    Neuro: A/O times 3, no focal deficits noted    Psychiatric: Cooperative         DIAGNOSTIC STUDIES / PROCEDURES    EKG  12 Lead EKG obtained at 0526 and interpreted by me:   Rate: 109   Rhythm: Atrial fibrillation  Intervals: Normal   QRS: Late precordial R wave transition  ST segments: Normal  T Waves: Normal    Clinical Impression: Atrial fibrillation with RVR without evidence of acute ischemic changes       LABS  All labs reviewed by me.     Results for orders placed or performed during the hospital encounter of 10/13/18   CBC WITH DIFFERENTIAL   Result Value Ref Range    WBC 8.9 4.8 - 10.8 K/uL    RBC 4.23 (L) 4.70 - 6.10 M/uL    Hemoglobin 12.4 (L) 14.0 - 18.0 g/dL    Hematocrit 37.5 (L) 42.0 - 52.0 %    MCV 88.7 81.4 - 97.8 fL    MCH 29.3 27.0 - 33.0 pg    MCHC 33.1 (L) 33.7 - 35.3 g/dL    RDW 45.8 35.9 - 50.0 fL    Platelet Count 229 164 - 446 K/uL    MPV 10.7 9.0 - 12.9 fL    Neutrophils-Polys 61.90 44.00 - 72.00 %    Lymphocytes 24.50 22.00 - 41.00 %    Monocytes 9.60 0.00 - 13.40 %    Eosinophils 3.20 0.00 - 6.90 %    Basophils 0.50 0.00 - 1.80 %    Immature Granulocytes 0.30 0.00 - 0.90 %    Nucleated RBC 0.00 /100 WBC    Neutrophils (Absolute) 5.50 1.82 - 7.42 K/uL    Lymphs (Absolute) 2.17 1.00 - 4.80 K/uL    Monos (Absolute) 0.85 0.00 - 0.85 K/uL    Eos (Absolute) 0.28 0.00 - 0.51 K/uL    Baso (Absolute) 0.04 0.00 - 0.12 K/uL    Immature Granulocytes (abs) 0.03 0.00 - 0.11 K/uL    NRBC (Absolute) 0.00 K/uL   COMP METABOLIC PANEL   Result Value Ref Range    Sodium 137 135 - 145 mmol/L    Potassium 4.0 3.6 - 5.5 mmol/L    Chloride 103 96 - 112 mmol/L    Co2 24 20 - 33 mmol/L    Anion Gap 10.0 0.0 - 11.9    Glucose 119 (H) 65 -  99 mg/dL    Bun 30 (H) 8 - 22 mg/dL    Creatinine 0.71 0.50 - 1.40 mg/dL    Calcium 9.2 8.5 - 10.5 mg/dL    AST(SGOT) 25 12 - 45 U/L    ALT(SGPT) 23 2 - 50 U/L    Alkaline Phosphatase 82 30 - 99 U/L    Total Bilirubin 1.1 0.1 - 1.5 mg/dL    Albumin 3.4 3.2 - 4.9 g/dL    Total Protein 6.6 6.0 - 8.2 g/dL    Globulin 3.2 1.9 - 3.5 g/dL    A-G Ratio 1.1 g/dL   BTYPE NATRIURETIC PEPTIDE   Result Value Ref Range    B Natriuretic Peptide 125 (H) 0 - 100 pg/mL   TROPONIN   Result Value Ref Range    Troponin I <0.01 0.00 - 0.04 ng/mL   MAGNESIUM   Result Value Ref Range    Magnesium 1.9 1.5 - 2.5 mg/dL   TSH   Result Value Ref Range    TSH 5.100 0.380 - 5.330 uIU/mL   LACTIC ACID   Result Value Ref Range    Lactic Acid 1.3 0.5 - 2.0 mmol/L   ESTIMATED GFR   Result Value Ref Range    GFR If African American >60 >60 mL/min/1.73 m 2    GFR If Non African American >60 >60 mL/min/1.73 m 2   EKG (Now)   Result Value Ref Range    Report       [facility] Emergency Dept.    Test Date:  2018-10-13  Pt Name:    EMELY FLORES                Department: ER  MRN:        [id number]                      Room:       [facility]  Gender:     Male                         Technician: [id number]  :        1965                   Requested By:ER TRIAGE PROTOCOL  Order #:    [id number]                    Reading MD:    Measurements  Intervals                                Axis  Rate:       109                          P:  ND:                                      QRS:        16  QRSD:       110                          T:          64  QT:         348  QTc:        469    Interpretive Statements  ATRIAL FIBRILLATION  NONSPECIFIC INTRAVENTRICULAR CONDUCTION DELAY  LOW VOLTAGE IN FRONTAL LEADS  BORDERLINE R WAVE PROGRESSION, ANTERIOR LEADS  Compared to ECG 10/07/2018 01:55:11  Intraventricular conduction delay now present  T-wave abnormality no longer present          RADIOLOGY  The radiologist's interpretation of all radiological studies  have been reviewed by me.     DX-CHEST-2 VIEWS   Final Result         1. No active cardiopulmonary abnormalities are identified.             COURSE & MEDICAL DECISION MAKING  Nursing notes, VS, PMSFHx reviewed in chart.     Review of past medical records shows the patient was seen in this ED October 7, 2018 for chest pain and atrial fibrillation with RVR.  He was seen in this ED October 5, 2018 for chest pain.  He was seen in this ED September 28, 2018 for seizure.  Patient was admitted on September 26, 2018 for chest pain with increased lower extremity edema and discharged on September 27, 2018.  Patient with cardiac catheterization in July without evidence of coronary artery disease.  Patient with cardiac echo January 27, 2018 and July 9, 2018 showing moderately reduced left ventricular systolic function with ejection fraction 40-45%.      Differential diagnoses considered include but are not limited to: CHF, lymphedema, electrolyte abnormality, candidiasis      Patient brought by EMS to the ED with above complaint.  EKG showed atrial fibrillation with RVR without evidence of acute ischemic changes.  Chest x-ray without evidence of acute abnormalities.  Labs are fairly unremarkable with abnormalities that appear stable compared to prior results.  Findings discussed with the patient.  Patient remained in A. fib but with variable heart rate.  His heart rate was in the 90s without any intervention on my reexam.  He has otherwise been hemodynamically stable.  Patient is on Xarelto for his atrial fibrillation.  Doubt DVT/vascular occlusion.  Low clinical suspicion for more serious acute pathology such as cellulitis/necrotizing fasciitis/compartment syndrome.  Patient was advised on elevation of his lower extremities and continuing with his medications as prescribed.  He also has evidence of intertrigo candidiasis and will be prescribed Chlortrimazole cream.  I discussed with patient good skin hygiene as well as keeping  the areas clean and dry.  No clinical evidence of sepsis.  Patient is comfortable with outpatient treatment.  I discussed with him worrisome signs and symptoms and return to ED precautions and he was advised on outpatient follow-up.  Patient verbalized understanding and agreed with plan of care with no further questions or concerns.      The patient is referred to a primary physician for blood pressure management, diabetic screening, and for all other preventative health concerns.       FINAL IMPRESSION  1. Bilateral lower extremity edema Active   2. Candidal intertrigo Active   3. Chronic atrial fibrillation (HCC) Chronic          DISPOSITION  Patient will be discharged home in stable condition.       FOLLOW UP  Jerad Lomax M.D.  [address] [id number] [phone]    Call in 2 days      [facility], Emergency Dept  [address] [facility] [address] [phone]    If symptoms worsen         OUTPATIENT MEDICATIONS  Discharge Medication List as of 10/13/2018  7:16 AM      START taking these medications    Details   clotrimazole (LOTRIMIN) 1 % Cream Apply 1 Application to affected area(s) 2 times a day., Disp-1 Tube, R-0, Print Rx Paper                Electronically signed by: Tray Guzman, 10/13/2018 5:29 AM      Portions of this record were made with voice recognition software.  Despite my review, spelling/grammar/context errors may still remain.  Interpretation of this chart should be taken in this context.

## 2018-10-13 NOTE — ED NOTES
KINDER FALL RISK       RISK  Present to ED b/c of fall (syncope, seizure, or ALOC) **no*  Age  >70 *no**  Altered Mental Status (Intoxicated with alcohol or substance confusion, inability to follow instructions, disorientation) **no*  Impaired Mobility (Ambulates or transfers with assistive devices or assist. Ambulates with unsteady gait and no assistance.  Unable to ambulate to transfer.) *no**  Nursing Judgement (Bowel or bladder incontinence, diarrhea, urinary frequency or urgency, leg weakness, orthostatic hypotension, dizziness or vertigo, narcotic use.) *no**    YES TO ANY RISK = HIGH FALL RISK     Interventions in place marked in RED   1. Move patient closer to nurses stations  2. Familiarize the patient with environment  3. Place call light within reach and demonstrate call light use  4. Keep patients personal possessions within patient safe reach (if appropriate)   5. Place stretcher in low position and brakes locked  6.Place yellow socks and armband on patient   7. Place green triangle on patients door  8. Give patient urinal if applicable  9. Keep floor surfaces clean and dry  10.Keep patient care areas uncluttered  11. Use a lap belt or posey vest   12. Assess patient hourly for :Pain, persona needs, position change, and call light access.

## 2018-10-13 NOTE — ED NOTES
Was able to locate a pedal pulse in right foot, unable to in the left foot. Dr. Guzman at the bedside and aware.

## 2018-10-14 ENCOUNTER — PATIENT OUTREACH (OUTPATIENT)
Dept: HEALTH INFORMATION MANAGEMENT | Facility: OTHER | Age: 53
End: 2018-10-14

## 2018-10-14 ENCOUNTER — HOSPITAL ENCOUNTER (INPATIENT)
Dept: HOSPITAL 8 - ED | Age: 53
LOS: 4 days | Discharge: HOME | DRG: 872 | End: 2018-10-18
Attending: HOSPITALIST | Admitting: HOSPITALIST
Payer: MEDICAID

## 2018-10-14 VITALS — SYSTOLIC BLOOD PRESSURE: 107 MMHG | DIASTOLIC BLOOD PRESSURE: 72 MMHG

## 2018-10-14 VITALS — HEIGHT: 72 IN | WEIGHT: 279.99 LBS | BODY MASS INDEX: 37.92 KG/M2

## 2018-10-14 VITALS — DIASTOLIC BLOOD PRESSURE: 68 MMHG | SYSTOLIC BLOOD PRESSURE: 108 MMHG

## 2018-10-14 VITALS — DIASTOLIC BLOOD PRESSURE: 74 MMHG | SYSTOLIC BLOOD PRESSURE: 111 MMHG

## 2018-10-14 VITALS — SYSTOLIC BLOOD PRESSURE: 115 MMHG | DIASTOLIC BLOOD PRESSURE: 72 MMHG

## 2018-10-14 VITALS — SYSTOLIC BLOOD PRESSURE: 125 MMHG | DIASTOLIC BLOOD PRESSURE: 65 MMHG

## 2018-10-14 VITALS — DIASTOLIC BLOOD PRESSURE: 59 MMHG | SYSTOLIC BLOOD PRESSURE: 102 MMHG

## 2018-10-14 DIAGNOSIS — E78.00: ICD-10-CM

## 2018-10-14 DIAGNOSIS — E66.01: ICD-10-CM

## 2018-10-14 DIAGNOSIS — I08.3: ICD-10-CM

## 2018-10-14 DIAGNOSIS — Z89.411: ICD-10-CM

## 2018-10-14 DIAGNOSIS — S60.942A: ICD-10-CM

## 2018-10-14 DIAGNOSIS — D72.829: ICD-10-CM

## 2018-10-14 DIAGNOSIS — Z79.01: ICD-10-CM

## 2018-10-14 DIAGNOSIS — I10: ICD-10-CM

## 2018-10-14 DIAGNOSIS — A41.9: Primary | ICD-10-CM

## 2018-10-14 DIAGNOSIS — I16.0: ICD-10-CM

## 2018-10-14 DIAGNOSIS — Z59.0: ICD-10-CM

## 2018-10-14 DIAGNOSIS — Z91.14: ICD-10-CM

## 2018-10-14 DIAGNOSIS — I48.91: ICD-10-CM

## 2018-10-14 DIAGNOSIS — E44.0: ICD-10-CM

## 2018-10-14 DIAGNOSIS — E11.65: ICD-10-CM

## 2018-10-14 DIAGNOSIS — B37.2: ICD-10-CM

## 2018-10-14 DIAGNOSIS — E87.1: ICD-10-CM

## 2018-10-14 DIAGNOSIS — M19.042: ICD-10-CM

## 2018-10-14 DIAGNOSIS — Z23: ICD-10-CM

## 2018-10-14 DIAGNOSIS — E11.40: ICD-10-CM

## 2018-10-14 DIAGNOSIS — B95.0: ICD-10-CM

## 2018-10-14 LAB
ALBUMIN SERPL-MCNC: 3.3 G/DL (ref 3.4–5)
ALP SERPL-CCNC: 117 U/L (ref 45–117)
ALT SERPL-CCNC: 35 U/L (ref 12–78)
ANION GAP SERPL CALC-SCNC: 7 MMOL/L (ref 5–15)
APTT BLD: 31 SECONDS (ref 25–31)
BASOPHILS # BLD AUTO: 0.04 X10^3/UL (ref 0–0.1)
BASOPHILS NFR BLD AUTO: 0 % (ref 0–1)
BILIRUB SERPL-MCNC: 1 MG/DL (ref 0.2–1)
CALCIUM SERPL-MCNC: 9.2 MG/DL (ref 8.5–10.1)
CHLORIDE SERPL-SCNC: 104 MMOL/L (ref 98–107)
CREAT SERPL-MCNC: 0.85 MG/DL (ref 0.7–1.3)
CULTURE INDICATED?: YES
EOSINOPHIL # BLD AUTO: 0.21 X10^3/UL (ref 0–0.4)
EOSINOPHIL NFR BLD AUTO: 2 % (ref 1–7)
ERYTHROCYTE [DISTWIDTH] IN BLOOD BY AUTOMATED COUNT: 14.5 % (ref 9.4–14.8)
EST. AVERAGE GLUCOSE BLD GHB EST-MCNC: 120 MG/DL (ref 0–126)
HBA1C MFR BLD: 5.8 % (ref 4.2–6.3)
INR PPP: 1.1 (ref 0.93–1.1)
LYMPHOCYTES # BLD AUTO: 1.5 X10^3/UL (ref 1–3.4)
LYMPHOCYTES NFR BLD AUTO: 13 % (ref 22–44)
MCH RBC QN AUTO: 29.1 PG (ref 27.5–34.5)
MCHC RBC AUTO-ENTMCNC: 33.2 G/DL (ref 33.2–36.2)
MCV RBC AUTO: 87.6 FL (ref 81–97)
MD: NO
MICROSCOPIC: (no result)
MONOCYTES # BLD AUTO: 0.65 X10^3/UL (ref 0.2–0.8)
MONOCYTES NFR BLD AUTO: 6 % (ref 2–9)
NEUTROPHILS # BLD AUTO: 9.46 X10^3/UL (ref 1.8–6.8)
NEUTROPHILS NFR BLD AUTO: 80 % (ref 42–75)
PLATELET # BLD AUTO: 271 X10^3/UL (ref 130–400)
PMV BLD AUTO: 9 FL (ref 7.4–10.4)
PROT SERPL-MCNC: 7.8 G/DL (ref 6.4–8.2)
PROTHROMBIN TIME: 11.3 SECONDS (ref 9.6–11.5)
RBC # BLD AUTO: 4.59 X10^6/UL (ref 4.38–5.82)
TROPONIN I SERPL-MCNC: < 0.015 NG/ML (ref 0–0.04)
TROPONIN I SERPL-MCNC: < 0.015 NG/ML (ref 0–0.04)
TSH SERPL-ACNC: 3.71 MIU/L (ref 0.36–3.74)

## 2018-10-14 PROCEDURE — A9585 GADOBUTROL INJECTION: HCPCS

## 2018-10-14 PROCEDURE — 82962 GLUCOSE BLOOD TEST: CPT

## 2018-10-14 PROCEDURE — 81001 URINALYSIS AUTO W/SCOPE: CPT

## 2018-10-14 PROCEDURE — 83605 ASSAY OF LACTIC ACID: CPT

## 2018-10-14 PROCEDURE — 36415 COLL VENOUS BLD VENIPUNCTURE: CPT

## 2018-10-14 PROCEDURE — 84100 ASSAY OF PHOSPHORUS: CPT

## 2018-10-14 PROCEDURE — 87181 SC STD AGAR DILUTION PER AGT: CPT

## 2018-10-14 PROCEDURE — 93970 EXTREMITY STUDY: CPT

## 2018-10-14 PROCEDURE — 87077 CULTURE AEROBIC IDENTIFY: CPT

## 2018-10-14 PROCEDURE — 85025 COMPLETE CBC W/AUTO DIFF WBC: CPT

## 2018-10-14 PROCEDURE — 85730 THROMBOPLASTIN TIME PARTIAL: CPT

## 2018-10-14 PROCEDURE — 85610 PROTHROMBIN TIME: CPT

## 2018-10-14 PROCEDURE — 80053 COMPREHEN METABOLIC PANEL: CPT

## 2018-10-14 PROCEDURE — 87147 CULTURE TYPE IMMUNOLOGIC: CPT

## 2018-10-14 PROCEDURE — 99285 EMERGENCY DEPT VISIT HI MDM: CPT

## 2018-10-14 PROCEDURE — 83880 ASSAY OF NATRIURETIC PEPTIDE: CPT

## 2018-10-14 PROCEDURE — 87040 BLOOD CULTURE FOR BACTERIA: CPT

## 2018-10-14 PROCEDURE — 83036 HEMOGLOBIN GLYCOSYLATED A1C: CPT

## 2018-10-14 PROCEDURE — 87086 URINE CULTURE/COLONY COUNT: CPT

## 2018-10-14 PROCEDURE — 93005 ELECTROCARDIOGRAM TRACING: CPT

## 2018-10-14 PROCEDURE — 96375 TX/PRO/DX INJ NEW DRUG ADDON: CPT

## 2018-10-14 PROCEDURE — 87205 SMEAR GRAM STAIN: CPT

## 2018-10-14 PROCEDURE — 71045 X-RAY EXAM CHEST 1 VIEW: CPT

## 2018-10-14 PROCEDURE — 87070 CULTURE OTHR SPECIMN AEROBIC: CPT

## 2018-10-14 PROCEDURE — 93306 TTE W/DOPPLER COMPLETE: CPT

## 2018-10-14 PROCEDURE — 80202 ASSAY OF VANCOMYCIN: CPT

## 2018-10-14 PROCEDURE — 96374 THER/PROPH/DIAG INJ IV PUSH: CPT

## 2018-10-14 PROCEDURE — 83735 ASSAY OF MAGNESIUM: CPT

## 2018-10-14 PROCEDURE — 80048 BASIC METABOLIC PNL TOTAL CA: CPT

## 2018-10-14 PROCEDURE — 84484 ASSAY OF TROPONIN QUANT: CPT

## 2018-10-14 PROCEDURE — 84443 ASSAY THYROID STIM HORMONE: CPT

## 2018-10-14 RX ADMIN — INSULIN LISPRO SCH UNITS: 100 INJECTION, SOLUTION INTRAVENOUS; SUBCUTANEOUS at 16:00

## 2018-10-14 RX ADMIN — ACETAMINOPHEN PRN MG: 325 TABLET, FILM COATED ORAL at 22:56

## 2018-10-14 RX ADMIN — METOPROLOL TARTRATE SCH MG: 25 TABLET, FILM COATED ORAL at 21:18

## 2018-10-14 RX ADMIN — SODIUM CHLORIDE, PRESERVATIVE FREE SCH ML: 5 INJECTION INTRAVENOUS at 21:17

## 2018-10-14 RX ADMIN — RIVAROXABAN SCH MG: 20 TABLET, FILM COATED ORAL at 21:18

## 2018-10-14 RX ADMIN — METOPROLOL TARTRATE PRN MG: 5 INJECTION, SOLUTION INTRAVENOUS at 07:01

## 2018-10-14 RX ADMIN — ACETAMINOPHEN PRN MG: 325 TABLET, FILM COATED ORAL at 16:42

## 2018-10-14 RX ADMIN — PRAVASTATIN SODIUM SCH MG: 20 TABLET ORAL at 21:18

## 2018-10-14 RX ADMIN — METOPROLOL TARTRATE PRN MG: 5 INJECTION, SOLUTION INTRAVENOUS at 07:22

## 2018-10-14 RX ADMIN — INSULIN LISPRO SCH UNITS: 100 INJECTION, SOLUTION INTRAVENOUS; SUBCUTANEOUS at 21:32

## 2018-10-14 RX ADMIN — AMIODARONE HYDROCHLORIDE SCH MG: 200 TABLET ORAL at 21:18

## 2018-10-14 SDOH — ECONOMIC STABILITY - HOUSING INSECURITY: HOMELESSNESS: Z59.0

## 2018-10-14 NOTE — PROGRESS NOTES
Placed discharge outreach phone call to pt s/p ER discharge 10/13/18.  Left voicemail providing my contact information and instructions to call with any questions or concerns.

## 2018-10-15 VITALS — DIASTOLIC BLOOD PRESSURE: 61 MMHG | SYSTOLIC BLOOD PRESSURE: 110 MMHG

## 2018-10-15 VITALS — SYSTOLIC BLOOD PRESSURE: 90 MMHG | DIASTOLIC BLOOD PRESSURE: 51 MMHG

## 2018-10-15 VITALS — SYSTOLIC BLOOD PRESSURE: 93 MMHG | DIASTOLIC BLOOD PRESSURE: 64 MMHG

## 2018-10-15 VITALS — SYSTOLIC BLOOD PRESSURE: 97 MMHG | DIASTOLIC BLOOD PRESSURE: 57 MMHG

## 2018-10-15 VITALS — DIASTOLIC BLOOD PRESSURE: 76 MMHG | SYSTOLIC BLOOD PRESSURE: 124 MMHG

## 2018-10-15 VITALS — DIASTOLIC BLOOD PRESSURE: 71 MMHG | SYSTOLIC BLOOD PRESSURE: 107 MMHG

## 2018-10-15 LAB
ANION GAP SERPL CALC-SCNC: 7 MMOL/L (ref 5–15)
BASOPHILS # BLD AUTO: 0.05 X10^3/UL (ref 0–0.1)
BASOPHILS NFR BLD AUTO: 0 % (ref 0–1)
CALCIUM SERPL-MCNC: 8.5 MG/DL (ref 8.5–10.1)
CHLORIDE SERPL-SCNC: 104 MMOL/L (ref 98–107)
CREAT SERPL-MCNC: 0.65 MG/DL (ref 0.7–1.3)
EOSINOPHIL # BLD AUTO: 0.05 X10^3/UL (ref 0–0.4)
EOSINOPHIL NFR BLD AUTO: 0 % (ref 1–7)
ERYTHROCYTE [DISTWIDTH] IN BLOOD BY AUTOMATED COUNT: 14.6 % (ref 9.4–14.8)
LYMPHOCYTES # BLD AUTO: 1.58 X10^3/UL (ref 1–3.4)
LYMPHOCYTES NFR BLD AUTO: 10 % (ref 22–44)
MCH RBC QN AUTO: 29 PG (ref 27.5–34.5)
MCHC RBC AUTO-ENTMCNC: 33.3 G/DL (ref 33.2–36.2)
MCV RBC AUTO: 87 FL (ref 81–97)
MD: NO
MONOCYTES # BLD AUTO: 0.59 X10^3/UL (ref 0.2–0.8)
MONOCYTES NFR BLD AUTO: 4 % (ref 2–9)
NEUTROPHILS # BLD AUTO: 12.92 X10^3/UL (ref 1.8–6.8)
NEUTROPHILS NFR BLD AUTO: 85 % (ref 42–75)
PLATELET # BLD AUTO: 180 X10^3/UL (ref 130–400)
PMV BLD AUTO: 9.5 FL (ref 7.4–10.4)
RBC # BLD AUTO: 4.13 X10^6/UL (ref 4.38–5.82)

## 2018-10-15 RX ADMIN — INSULIN LISPRO SCH UNITS: 100 INJECTION, SOLUTION INTRAVENOUS; SUBCUTANEOUS at 07:00

## 2018-10-15 RX ADMIN — INSULIN LISPRO SCH UNITS: 100 INJECTION, SOLUTION INTRAVENOUS; SUBCUTANEOUS at 21:16

## 2018-10-15 RX ADMIN — ACETAMINOPHEN PRN MG: 325 TABLET, FILM COATED ORAL at 08:44

## 2018-10-15 RX ADMIN — INSULIN LISPRO SCH UNITS: 100 INJECTION, SOLUTION INTRAVENOUS; SUBCUTANEOUS at 16:00

## 2018-10-15 RX ADMIN — KETOROLAC TROMETHAMINE PRN MG: 30 INJECTION, SOLUTION INTRAMUSCULAR at 21:15

## 2018-10-15 RX ADMIN — AMIODARONE HYDROCHLORIDE SCH MG: 200 TABLET ORAL at 08:27

## 2018-10-15 RX ADMIN — CEFTRIAXONE SCH MLS/HR: 2 INJECTION, POWDER, FOR SOLUTION INTRAMUSCULAR; INTRAVENOUS at 02:02

## 2018-10-15 RX ADMIN — PRAVASTATIN SODIUM SCH MG: 20 TABLET ORAL at 21:09

## 2018-10-15 RX ADMIN — METOPROLOL TARTRATE SCH MG: 25 TABLET, FILM COATED ORAL at 21:09

## 2018-10-15 RX ADMIN — RIVAROXABAN SCH MG: 20 TABLET, FILM COATED ORAL at 21:09

## 2018-10-15 RX ADMIN — SODIUM CHLORIDE, PRESERVATIVE FREE SCH ML: 5 INJECTION INTRAVENOUS at 21:09

## 2018-10-15 RX ADMIN — ACETAMINOPHEN PRN MG: 325 TABLET, FILM COATED ORAL at 17:24

## 2018-10-15 RX ADMIN — NYSTATIN SCH APPLIC: 100000 POWDER TOPICAL at 17:08

## 2018-10-15 RX ADMIN — SODIUM CHLORIDE, PRESERVATIVE FREE SCH ML: 5 INJECTION INTRAVENOUS at 08:27

## 2018-10-15 RX ADMIN — METOPROLOL TARTRATE SCH MG: 25 TABLET, FILM COATED ORAL at 08:27

## 2018-10-15 RX ADMIN — AMIODARONE HYDROCHLORIDE SCH MG: 200 TABLET ORAL at 21:10

## 2018-10-15 RX ADMIN — NYSTATIN SCH APPLIC: 100000 POWDER TOPICAL at 20:55

## 2018-10-15 RX ADMIN — VANCOMYCIN HYDROCHLORIDE SCH MLS/HR: 1 INJECTION, POWDER, LYOPHILIZED, FOR SOLUTION INTRAVENOUS at 02:33

## 2018-10-15 RX ADMIN — INSULIN LISPRO SCH UNITS: 100 INJECTION, SOLUTION INTRAVENOUS; SUBCUTANEOUS at 11:00

## 2018-10-15 RX ADMIN — VANCOMYCIN HYDROCHLORIDE SCH MLS/HR: 1 INJECTION, POWDER, LYOPHILIZED, FOR SOLUTION INTRAVENOUS at 13:58

## 2018-10-16 VITALS — DIASTOLIC BLOOD PRESSURE: 75 MMHG | SYSTOLIC BLOOD PRESSURE: 111 MMHG

## 2018-10-16 VITALS — DIASTOLIC BLOOD PRESSURE: 79 MMHG | SYSTOLIC BLOOD PRESSURE: 125 MMHG

## 2018-10-16 VITALS — SYSTOLIC BLOOD PRESSURE: 125 MMHG | DIASTOLIC BLOOD PRESSURE: 71 MMHG

## 2018-10-16 VITALS — SYSTOLIC BLOOD PRESSURE: 102 MMHG | DIASTOLIC BLOOD PRESSURE: 55 MMHG

## 2018-10-16 VITALS — SYSTOLIC BLOOD PRESSURE: 99 MMHG | DIASTOLIC BLOOD PRESSURE: 64 MMHG

## 2018-10-16 RX ADMIN — AMIODARONE HYDROCHLORIDE SCH MG: 200 TABLET ORAL at 21:48

## 2018-10-16 RX ADMIN — PRAVASTATIN SODIUM SCH MG: 20 TABLET ORAL at 21:48

## 2018-10-16 RX ADMIN — RIVAROXABAN SCH MG: 20 TABLET, FILM COATED ORAL at 21:48

## 2018-10-16 RX ADMIN — INSULIN LISPRO SCH UNITS: 100 INJECTION, SOLUTION INTRAVENOUS; SUBCUTANEOUS at 10:47

## 2018-10-16 RX ADMIN — CEFTRIAXONE SCH MLS/HR: 2 INJECTION, POWDER, FOR SOLUTION INTRAMUSCULAR; INTRAVENOUS at 01:26

## 2018-10-16 RX ADMIN — INSULIN LISPRO SCH UNITS: 100 INJECTION, SOLUTION INTRAVENOUS; SUBCUTANEOUS at 08:37

## 2018-10-16 RX ADMIN — NYSTATIN SCH APPLIC: 100000 POWDER TOPICAL at 16:56

## 2018-10-16 RX ADMIN — METOPROLOL TARTRATE SCH MG: 25 TABLET, FILM COATED ORAL at 08:36

## 2018-10-16 RX ADMIN — VANCOMYCIN HYDROCHLORIDE SCH MLS/HR: 1 INJECTION, POWDER, LYOPHILIZED, FOR SOLUTION INTRAVENOUS at 02:06

## 2018-10-16 RX ADMIN — INSULIN LISPRO SCH UNITS: 100 INJECTION, SOLUTION INTRAVENOUS; SUBCUTANEOUS at 21:19

## 2018-10-16 RX ADMIN — INSULIN LISPRO SCH UNITS: 100 INJECTION, SOLUTION INTRAVENOUS; SUBCUTANEOUS at 16:54

## 2018-10-16 RX ADMIN — NYSTATIN SCH APPLIC: 100000 POWDER TOPICAL at 21:47

## 2018-10-16 RX ADMIN — NYSTATIN SCH APPLIC: 100000 POWDER TOPICAL at 08:37

## 2018-10-16 RX ADMIN — VANCOMYCIN HYDROCHLORIDE SCH MLS/HR: 1 INJECTION, POWDER, LYOPHILIZED, FOR SOLUTION INTRAVENOUS at 14:15

## 2018-10-16 RX ADMIN — AMIODARONE HYDROCHLORIDE SCH MG: 200 TABLET ORAL at 08:36

## 2018-10-16 RX ADMIN — ACETAMINOPHEN PRN MG: 325 TABLET, FILM COATED ORAL at 21:49

## 2018-10-16 RX ADMIN — SODIUM CHLORIDE, PRESERVATIVE FREE SCH ML: 5 INJECTION INTRAVENOUS at 21:47

## 2018-10-16 RX ADMIN — SODIUM CHLORIDE, PRESERVATIVE FREE SCH ML: 5 INJECTION INTRAVENOUS at 08:37

## 2018-10-16 RX ADMIN — TAMSULOSIN HYDROCHLORIDE SCH MG: 0.4 CAPSULE ORAL at 21:48

## 2018-10-16 RX ADMIN — KETOROLAC TROMETHAMINE PRN MG: 30 INJECTION, SOLUTION INTRAMUSCULAR at 08:36

## 2018-10-16 RX ADMIN — KETOROLAC TROMETHAMINE PRN MG: 30 INJECTION, SOLUTION INTRAMUSCULAR at 16:55

## 2018-10-16 RX ADMIN — METOPROLOL TARTRATE SCH MG: 25 TABLET, FILM COATED ORAL at 21:48

## 2018-10-17 VITALS — DIASTOLIC BLOOD PRESSURE: 76 MMHG | SYSTOLIC BLOOD PRESSURE: 140 MMHG

## 2018-10-17 VITALS — SYSTOLIC BLOOD PRESSURE: 105 MMHG | DIASTOLIC BLOOD PRESSURE: 63 MMHG

## 2018-10-17 VITALS — SYSTOLIC BLOOD PRESSURE: 141 MMHG | DIASTOLIC BLOOD PRESSURE: 95 MMHG

## 2018-10-17 VITALS — DIASTOLIC BLOOD PRESSURE: 78 MMHG | SYSTOLIC BLOOD PRESSURE: 115 MMHG

## 2018-10-17 LAB
ANION GAP SERPL CALC-SCNC: 6 MMOL/L (ref 5–15)
BASOPHILS # BLD AUTO: 0.02 X10^3/UL (ref 0–0.1)
BASOPHILS NFR BLD AUTO: 0 % (ref 0–1)
CALCIUM SERPL-MCNC: 8 MG/DL (ref 8.5–10.1)
CHLORIDE SERPL-SCNC: 106 MMOL/L (ref 98–107)
CREAT SERPL-MCNC: 0.81 MG/DL (ref 0.7–1.3)
EOSINOPHIL # BLD AUTO: 0.25 X10^3/UL (ref 0–0.4)
EOSINOPHIL NFR BLD AUTO: 3 % (ref 1–7)
ERYTHROCYTE [DISTWIDTH] IN BLOOD BY AUTOMATED COUNT: 14.8 % (ref 9.4–14.8)
LYMPHOCYTES # BLD AUTO: 1.49 X10^3/UL (ref 1–3.4)
LYMPHOCYTES NFR BLD AUTO: 17 % (ref 22–44)
MCH RBC QN AUTO: 29 PG (ref 27.5–34.5)
MCHC RBC AUTO-ENTMCNC: 33.4 G/DL (ref 33.2–36.2)
MCV RBC AUTO: 86.8 FL (ref 81–97)
MD: NO
MONOCYTES # BLD AUTO: 0.57 X10^3/UL (ref 0.2–0.8)
MONOCYTES NFR BLD AUTO: 7 % (ref 2–9)
NEUTROPHILS # BLD AUTO: 6.39 X10^3/UL (ref 1.8–6.8)
NEUTROPHILS NFR BLD AUTO: 73 % (ref 42–75)
PLATELET # BLD AUTO: 212 X10^3/UL (ref 130–400)
PMV BLD AUTO: 9.4 FL (ref 7.4–10.4)
RBC # BLD AUTO: 4.12 X10^6/UL (ref 4.38–5.82)

## 2018-10-17 RX ADMIN — CEFTRIAXONE SCH MLS/HR: 2 INJECTION, POWDER, FOR SOLUTION INTRAMUSCULAR; INTRAVENOUS at 01:28

## 2018-10-17 RX ADMIN — INSULIN LISPRO SCH UNITS: 100 INJECTION, SOLUTION INTRAVENOUS; SUBCUTANEOUS at 12:12

## 2018-10-17 RX ADMIN — METOPROLOL TARTRATE SCH MG: 25 TABLET, FILM COATED ORAL at 08:02

## 2018-10-17 RX ADMIN — SODIUM CHLORIDE, PRESERVATIVE FREE SCH ML: 5 INJECTION INTRAVENOUS at 21:30

## 2018-10-17 RX ADMIN — INSULIN LISPRO SCH UNITS: 100 INJECTION, SOLUTION INTRAVENOUS; SUBCUTANEOUS at 21:00

## 2018-10-17 RX ADMIN — NYSTATIN SCH APPLIC: 100000 POWDER TOPICAL at 21:00

## 2018-10-17 RX ADMIN — PRAVASTATIN SODIUM SCH MG: 20 TABLET ORAL at 21:30

## 2018-10-17 RX ADMIN — METOPROLOL TARTRATE SCH MG: 25 TABLET, FILM COATED ORAL at 21:30

## 2018-10-17 RX ADMIN — NYSTATIN SCH APPLIC: 100000 POWDER TOPICAL at 17:43

## 2018-10-17 RX ADMIN — RIVAROXABAN SCH MG: 20 TABLET, FILM COATED ORAL at 21:29

## 2018-10-17 RX ADMIN — METOPROLOL TARTRATE SCH MG: 25 TABLET, FILM COATED ORAL at 12:13

## 2018-10-17 RX ADMIN — INSULIN LISPRO SCH UNITS: 100 INJECTION, SOLUTION INTRAVENOUS; SUBCUTANEOUS at 07:00

## 2018-10-17 RX ADMIN — NYSTATIN SCH APPLIC: 100000 POWDER TOPICAL at 08:03

## 2018-10-17 RX ADMIN — INSULIN LISPRO SCH UNITS: 100 INJECTION, SOLUTION INTRAVENOUS; SUBCUTANEOUS at 17:43

## 2018-10-17 RX ADMIN — VANCOMYCIN HYDROCHLORIDE SCH MLS/HR: 1 INJECTION, POWDER, LYOPHILIZED, FOR SOLUTION INTRAVENOUS at 02:05

## 2018-10-17 RX ADMIN — AMIODARONE HYDROCHLORIDE SCH MG: 200 TABLET ORAL at 08:02

## 2018-10-17 RX ADMIN — AMIODARONE HYDROCHLORIDE SCH MG: 200 TABLET ORAL at 21:29

## 2018-10-17 RX ADMIN — TAMSULOSIN HYDROCHLORIDE SCH MG: 0.4 CAPSULE ORAL at 21:29

## 2018-10-17 RX ADMIN — SODIUM CHLORIDE, PRESERVATIVE FREE SCH ML: 5 INJECTION INTRAVENOUS at 08:02

## 2018-10-18 VITALS — SYSTOLIC BLOOD PRESSURE: 130 MMHG | DIASTOLIC BLOOD PRESSURE: 84 MMHG

## 2018-10-18 VITALS — SYSTOLIC BLOOD PRESSURE: 143 MMHG | DIASTOLIC BLOOD PRESSURE: 89 MMHG

## 2018-10-18 VITALS — DIASTOLIC BLOOD PRESSURE: 64 MMHG | SYSTOLIC BLOOD PRESSURE: 148 MMHG

## 2018-10-18 VITALS — SYSTOLIC BLOOD PRESSURE: 111 MMHG | DIASTOLIC BLOOD PRESSURE: 73 MMHG

## 2018-10-18 VITALS — DIASTOLIC BLOOD PRESSURE: 89 MMHG | SYSTOLIC BLOOD PRESSURE: 143 MMHG

## 2018-10-18 RX ADMIN — KETOROLAC TROMETHAMINE PRN MG: 30 INJECTION, SOLUTION INTRAMUSCULAR at 01:33

## 2018-10-18 RX ADMIN — SODIUM CHLORIDE, PRESERVATIVE FREE SCH ML: 5 INJECTION INTRAVENOUS at 08:00

## 2018-10-18 RX ADMIN — INSULIN LISPRO SCH UNITS: 100 INJECTION, SOLUTION INTRAVENOUS; SUBCUTANEOUS at 07:00

## 2018-10-18 RX ADMIN — NYSTATIN SCH APPLIC: 100000 POWDER TOPICAL at 16:33

## 2018-10-18 RX ADMIN — INSULIN LISPRO SCH UNITS: 100 INJECTION, SOLUTION INTRAVENOUS; SUBCUTANEOUS at 11:00

## 2018-10-18 RX ADMIN — NYSTATIN SCH APPLIC: 100000 POWDER TOPICAL at 08:02

## 2018-10-18 RX ADMIN — AMIODARONE HYDROCHLORIDE SCH MG: 200 TABLET ORAL at 08:00

## 2018-10-18 RX ADMIN — METOPROLOL TARTRATE SCH MG: 25 TABLET, FILM COATED ORAL at 08:00

## 2018-10-19 ENCOUNTER — HOSPITAL ENCOUNTER (INPATIENT)
Dept: HOSPITAL 8 - ED | Age: 53
LOS: 2 days | Discharge: HOME | DRG: 603 | End: 2018-10-21
Attending: INTERNAL MEDICINE | Admitting: INTERNAL MEDICINE
Payer: MEDICAID

## 2018-10-19 VITALS — BODY MASS INDEX: 38.04 KG/M2 | WEIGHT: 280.87 LBS | HEIGHT: 72 IN

## 2018-10-19 VITALS — DIASTOLIC BLOOD PRESSURE: 72 MMHG | SYSTOLIC BLOOD PRESSURE: 157 MMHG

## 2018-10-19 DIAGNOSIS — Z59.0: ICD-10-CM

## 2018-10-19 DIAGNOSIS — I10: ICD-10-CM

## 2018-10-19 DIAGNOSIS — I89.0: ICD-10-CM

## 2018-10-19 DIAGNOSIS — I48.2: ICD-10-CM

## 2018-10-19 DIAGNOSIS — Z79.899: ICD-10-CM

## 2018-10-19 DIAGNOSIS — L03.116: Primary | ICD-10-CM

## 2018-10-19 DIAGNOSIS — R78.81: ICD-10-CM

## 2018-10-19 DIAGNOSIS — E66.01: ICD-10-CM

## 2018-10-19 DIAGNOSIS — Z91.14: ICD-10-CM

## 2018-10-19 DIAGNOSIS — E11.69: ICD-10-CM

## 2018-10-19 DIAGNOSIS — E78.5: ICD-10-CM

## 2018-10-19 DIAGNOSIS — Z79.01: ICD-10-CM

## 2018-10-19 DIAGNOSIS — D68.59: ICD-10-CM

## 2018-10-19 DIAGNOSIS — E11.40: ICD-10-CM

## 2018-10-19 DIAGNOSIS — Z89.411: ICD-10-CM

## 2018-10-19 LAB
ALBUMIN SERPL-MCNC: 2.7 G/DL (ref 3.4–5)
ALP SERPL-CCNC: 189 U/L (ref 45–117)
ALT SERPL-CCNC: 47 U/L (ref 12–78)
ANION GAP SERPL CALC-SCNC: 6 MMOL/L (ref 5–15)
APTT BLD: 31 SECONDS (ref 25–31)
BASOPHILS # BLD AUTO: 0.03 X10^3/UL (ref 0–0.1)
BASOPHILS NFR BLD AUTO: 0 % (ref 0–1)
BILIRUB SERPL-MCNC: 0.4 MG/DL (ref 0.2–1)
CALCIUM SERPL-MCNC: 8.5 MG/DL (ref 8.5–10.1)
CHLORIDE SERPL-SCNC: 105 MMOL/L (ref 98–107)
CREAT SERPL-MCNC: 1.01 MG/DL (ref 0.7–1.3)
EOSINOPHIL # BLD AUTO: 0.31 X10^3/UL (ref 0–0.4)
EOSINOPHIL NFR BLD AUTO: 3 % (ref 1–7)
ERYTHROCYTE [DISTWIDTH] IN BLOOD BY AUTOMATED COUNT: 14.8 % (ref 9.4–14.8)
INR PPP: 1.18 (ref 0.93–1.1)
LYMPHOCYTES # BLD AUTO: 2.2 X10^3/UL (ref 1–3.4)
LYMPHOCYTES NFR BLD AUTO: 20 % (ref 22–44)
MCH RBC QN AUTO: 28.6 PG (ref 27.5–34.5)
MCHC RBC AUTO-ENTMCNC: 32.8 G/DL (ref 33.2–36.2)
MCV RBC AUTO: 87.2 FL (ref 81–97)
MD: NO
MONOCYTES # BLD AUTO: 0.76 X10^3/UL (ref 0.2–0.8)
MONOCYTES NFR BLD AUTO: 7 % (ref 2–9)
NEUTROPHILS # BLD AUTO: 7.45 X10^3/UL (ref 1.8–6.8)
NEUTROPHILS NFR BLD AUTO: 69 % (ref 42–75)
PLATELET # BLD AUTO: 306 X10^3/UL (ref 130–400)
PMV BLD AUTO: 8.5 FL (ref 7.4–10.4)
PROT SERPL-MCNC: 7.4 G/DL (ref 6.4–8.2)
PROTHROMBIN TIME: 12.1 SECONDS (ref 9.6–11.5)
RBC # BLD AUTO: 4.36 X10^6/UL (ref 4.38–5.82)
TROPONIN I SERPL-MCNC: < 0.015 NG/ML (ref 0–0.04)

## 2018-10-19 PROCEDURE — 36415 COLL VENOUS BLD VENIPUNCTURE: CPT

## 2018-10-19 PROCEDURE — 84145 PROCALCITONIN (PCT): CPT

## 2018-10-19 PROCEDURE — 85730 THROMBOPLASTIN TIME PARTIAL: CPT

## 2018-10-19 PROCEDURE — 93005 ELECTROCARDIOGRAM TRACING: CPT

## 2018-10-19 PROCEDURE — 83880 ASSAY OF NATRIURETIC PEPTIDE: CPT

## 2018-10-19 PROCEDURE — 96374 THER/PROPH/DIAG INJ IV PUSH: CPT

## 2018-10-19 PROCEDURE — 96375 TX/PRO/DX INJ NEW DRUG ADDON: CPT

## 2018-10-19 PROCEDURE — 83605 ASSAY OF LACTIC ACID: CPT

## 2018-10-19 PROCEDURE — 80053 COMPREHEN METABOLIC PANEL: CPT

## 2018-10-19 PROCEDURE — 87040 BLOOD CULTURE FOR BACTERIA: CPT

## 2018-10-19 PROCEDURE — 85025 COMPLETE CBC W/AUTO DIFF WBC: CPT

## 2018-10-19 PROCEDURE — 84484 ASSAY OF TROPONIN QUANT: CPT

## 2018-10-19 PROCEDURE — 99285 EMERGENCY DEPT VISIT HI MDM: CPT

## 2018-10-19 PROCEDURE — 71045 X-RAY EXAM CHEST 1 VIEW: CPT

## 2018-10-19 PROCEDURE — 85610 PROTHROMBIN TIME: CPT

## 2018-10-19 SDOH — ECONOMIC STABILITY - HOUSING INSECURITY: HOMELESSNESS: Z59.0

## 2018-10-20 VITALS — SYSTOLIC BLOOD PRESSURE: 121 MMHG | DIASTOLIC BLOOD PRESSURE: 81 MMHG

## 2018-10-20 VITALS — SYSTOLIC BLOOD PRESSURE: 112 MMHG | DIASTOLIC BLOOD PRESSURE: 62 MMHG

## 2018-10-20 VITALS — SYSTOLIC BLOOD PRESSURE: 101 MMHG | DIASTOLIC BLOOD PRESSURE: 68 MMHG

## 2018-10-20 VITALS — SYSTOLIC BLOOD PRESSURE: 101 MMHG | DIASTOLIC BLOOD PRESSURE: 65 MMHG

## 2018-10-20 RX ADMIN — METOPROLOL TARTRATE SCH MG: 100 TABLET, FILM COATED ORAL at 18:08

## 2018-10-20 RX ADMIN — METOPROLOL TARTRATE SCH MG: 100 TABLET, FILM COATED ORAL at 08:48

## 2018-10-20 RX ADMIN — AMIODARONE HYDROCHLORIDE SCH MG: 200 TABLET ORAL at 08:48

## 2018-10-20 RX ADMIN — PRAVASTATIN SODIUM SCH MG: 20 TABLET ORAL at 01:30

## 2018-10-20 RX ADMIN — TAMSULOSIN HYDROCHLORIDE SCH MG: 0.4 CAPSULE ORAL at 01:30

## 2018-10-20 RX ADMIN — CEFTRIAXONE SCH MLS/HR: 2 INJECTION, SOLUTION INTRAVENOUS at 10:19

## 2018-10-20 RX ADMIN — AMIODARONE HYDROCHLORIDE SCH MG: 200 TABLET ORAL at 21:22

## 2018-10-20 RX ADMIN — TAMSULOSIN HYDROCHLORIDE SCH MG: 0.4 CAPSULE ORAL at 21:22

## 2018-10-20 RX ADMIN — PRAVASTATIN SODIUM SCH MG: 20 TABLET ORAL at 21:00

## 2018-10-21 VITALS — DIASTOLIC BLOOD PRESSURE: 70 MMHG | SYSTOLIC BLOOD PRESSURE: 113 MMHG

## 2018-10-21 VITALS — DIASTOLIC BLOOD PRESSURE: 73 MMHG | SYSTOLIC BLOOD PRESSURE: 111 MMHG

## 2018-10-21 RX ADMIN — AMIODARONE HYDROCHLORIDE SCH MG: 200 TABLET ORAL at 08:36

## 2018-10-21 RX ADMIN — METOPROLOL TARTRATE SCH MG: 100 TABLET, FILM COATED ORAL at 06:43

## 2018-10-21 RX ADMIN — CEFTRIAXONE SCH MLS/HR: 2 INJECTION, SOLUTION INTRAVENOUS at 08:36

## 2018-10-22 ENCOUNTER — APPOINTMENT (OUTPATIENT)
Dept: RADIOLOGY | Facility: MEDICAL CENTER | Age: 53
DRG: 603 | End: 2018-10-22
Attending: EMERGENCY MEDICINE
Payer: MEDICAID

## 2018-10-22 ENCOUNTER — HOSPITAL ENCOUNTER (INPATIENT)
Facility: MEDICAL CENTER | Age: 53
LOS: 3 days | DRG: 603 | End: 2018-10-25
Attending: EMERGENCY MEDICINE | Admitting: HOSPITALIST
Payer: MEDICAID

## 2018-10-22 DIAGNOSIS — R53.1 WEAKNESS: ICD-10-CM

## 2018-10-22 DIAGNOSIS — S83.92XA SPRAIN OF LEFT KNEE, UNSPECIFIED LIGAMENT, INITIAL ENCOUNTER: ICD-10-CM
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
## 2018-10-22 PROBLEM — L03.90 CELLULITIS: Status: ACTIVE | Noted: 2018-10-22

## 2018-10-22 PROBLEM — E66.812 CLASS 2 OBESITY DUE TO EXCESS CALORIES WITHOUT SERIOUS COMORBIDITY WITH BODY MASS INDEX (BMI) OF 36.0 TO 36.9 IN ADULT: Status: ACTIVE | Noted: 2018-10-22

## 2018-10-22 PROBLEM — E66.09 CLASS 2 OBESITY DUE TO EXCESS CALORIES WITHOUT SERIOUS COMORBIDITY WITH BODY MASS INDEX (BMI) OF 36.0 TO 36.9 IN ADULT: Status: ACTIVE | Noted: 2018-10-22

## 2018-10-22 LAB
ALBUMIN SERPL BCP-MCNC: 3.6 G/DL (ref 3.2–4.9)
ALBUMIN/GLOB SERPL: 1 G/DL
ALP SERPL-CCNC: 126 U/L (ref 30–99)
ALT SERPL-CCNC: 34 U/L (ref 2–50)
ANION GAP SERPL CALC-SCNC: 8 MMOL/L (ref 0–11.9)
APTT PPP: 30.7 SEC (ref 24.7–36)
AST SERPL-CCNC: 30 U/L (ref 12–45)
BASOPHILS # BLD AUTO: 0.5 % (ref 0–1.8)
BASOPHILS # BLD: 0.05 K/UL (ref 0–0.12)
BILIRUB SERPL-MCNC: 0.5 MG/DL (ref 0.1–1.5)
BNP SERPL-MCNC: 217 PG/ML (ref 0–100)
BUN SERPL-MCNC: 26 MG/DL (ref 8–22)
CALCIUM SERPL-MCNC: 9.4 MG/DL (ref 8.5–10.5)
CHLORIDE SERPL-SCNC: 102 MMOL/L (ref 96–112)
CO2 SERPL-SCNC: 27 MMOL/L (ref 20–33)
CREAT SERPL-MCNC: 0.88 MG/DL (ref 0.5–1.4)
EOSINOPHIL # BLD AUTO: 0.33 K/UL (ref 0–0.51)
EOSINOPHIL NFR BLD: 3.5 % (ref 0–6.9)
ERYTHROCYTE [DISTWIDTH] IN BLOOD BY AUTOMATED COUNT: 45.5 FL (ref 35.9–50)
GLOBULIN SER CALC-MCNC: 3.5 G/DL (ref 1.9–3.5)
GLUCOSE SERPL-MCNC: 115 MG/DL (ref 65–99)
HCT VFR BLD AUTO: 37.3 % (ref 42–52)
HGB BLD-MCNC: 11.9 G/DL (ref 14–18)
IMM GRANULOCYTES # BLD AUTO: 0.08 K/UL (ref 0–0.11)
IMM GRANULOCYTES NFR BLD AUTO: 0.8 % (ref 0–0.9)
INR PPP: 1.3 (ref 0.87–1.13)
LIPASE SERPL-CCNC: 14 U/L (ref 11–82)
LYMPHOCYTES # BLD AUTO: 2.17 K/UL (ref 1–4.8)
LYMPHOCYTES NFR BLD: 22.8 % (ref 22–41)
MCH RBC QN AUTO: 28.5 PG (ref 27–33)
MCHC RBC AUTO-ENTMCNC: 31.9 G/DL (ref 33.7–35.3)
MCV RBC AUTO: 89.2 FL (ref 81.4–97.8)
MONOCYTES # BLD AUTO: 0.68 K/UL (ref 0–0.85)
MONOCYTES NFR BLD AUTO: 7.1 % (ref 0–13.4)
NEUTROPHILS # BLD AUTO: 6.21 K/UL (ref 1.82–7.42)
NEUTROPHILS NFR BLD: 65.3 % (ref 44–72)
NRBC # BLD AUTO: 0 K/UL
NRBC BLD-RTO: 0 /100 WBC
PLATELET # BLD AUTO: 290 K/UL (ref 164–446)
PMV BLD AUTO: 10.5 FL (ref 9–12.9)
POTASSIUM SERPL-SCNC: 4 MMOL/L (ref 3.6–5.5)
PROT SERPL-MCNC: 7.1 G/DL (ref 6–8.2)
PROTHROMBIN TIME: 16.3 SEC (ref 12–14.6)
RBC # BLD AUTO: 4.18 M/UL (ref 4.7–6.1)
SODIUM SERPL-SCNC: 137 MMOL/L (ref 135–145)
TROPONIN I SERPL-MCNC: <0.01 NG/ML (ref 0–0.04)
WBC # BLD AUTO: 9.5 K/UL (ref 4.8–10.8)

## 2018-10-22 PROCEDURE — 93005 ELECTROCARDIOGRAM TRACING: CPT | Performed by: EMERGENCY MEDICINE

## 2018-10-22 PROCEDURE — 83880 ASSAY OF NATRIURETIC PEPTIDE: CPT

## 2018-10-22 PROCEDURE — 36415 COLL VENOUS BLD VENIPUNCTURE: CPT

## 2018-10-22 PROCEDURE — 85730 THROMBOPLASTIN TIME PARTIAL: CPT

## 2018-10-22 PROCEDURE — 99223 1ST HOSP IP/OBS HIGH 75: CPT | Performed by: HOSPITALIST

## 2018-10-22 PROCEDURE — 71045 X-RAY EXAM CHEST 1 VIEW: CPT

## 2018-10-22 PROCEDURE — 770006 HCHG ROOM/CARE - MED/SURG/GYN SEMI*

## 2018-10-22 PROCEDURE — 73564 X-RAY EXAM KNEE 4 OR MORE: CPT | Mod: LT

## 2018-10-22 PROCEDURE — 80053 COMPREHEN METABOLIC PANEL: CPT

## 2018-10-22 PROCEDURE — 84484 ASSAY OF TROPONIN QUANT: CPT

## 2018-10-22 PROCEDURE — 93970 EXTREMITY STUDY: CPT

## 2018-10-22 PROCEDURE — 83690 ASSAY OF LIPASE: CPT

## 2018-10-22 PROCEDURE — 85610 PROTHROMBIN TIME: CPT

## 2018-10-22 PROCEDURE — 85025 COMPLETE CBC W/AUTO DIFF WBC: CPT

## 2018-10-22 PROCEDURE — 99285 EMERGENCY DEPT VISIT HI MDM: CPT

## 2018-10-22 RX ORDER — PROMETHAZINE HYDROCHLORIDE 12.5 MG/1
12.5-25 SUPPOSITORY RECTAL EVERY 4 HOURS PRN
Status: DISCONTINUED | OUTPATIENT
Start: 2018-10-22 | End: 2018-10-25 | Stop reason: HOSPADM

## 2018-10-22 RX ORDER — PRAVASTATIN SODIUM 20 MG
20 TABLET ORAL DAILY
Status: DISCONTINUED | OUTPATIENT
Start: 2018-10-23 | End: 2018-10-25 | Stop reason: HOSPADM

## 2018-10-22 RX ORDER — BISACODYL 10 MG
10 SUPPOSITORY, RECTAL RECTAL
Status: DISCONTINUED | OUTPATIENT
Start: 2018-10-22 | End: 2018-10-25 | Stop reason: HOSPADM

## 2018-10-22 RX ORDER — CLINDAMYCIN PHOSPHATE 600 MG/50ML
600 INJECTION, SOLUTION INTRAVENOUS EVERY 8 HOURS
Status: DISCONTINUED | OUTPATIENT
Start: 2018-10-22 | End: 2018-10-23

## 2018-10-22 RX ORDER — ONDANSETRON 4 MG/1
4 TABLET, ORALLY DISINTEGRATING ORAL EVERY 4 HOURS PRN
Status: DISCONTINUED | OUTPATIENT
Start: 2018-10-22 | End: 2018-10-25 | Stop reason: HOSPADM

## 2018-10-22 RX ORDER — PROMETHAZINE HYDROCHLORIDE 25 MG/1
12.5-25 TABLET ORAL EVERY 4 HOURS PRN
Status: DISCONTINUED | OUTPATIENT
Start: 2018-10-22 | End: 2018-10-25 | Stop reason: HOSPADM

## 2018-10-22 RX ORDER — SODIUM CHLORIDE 9 MG/ML
INJECTION, SOLUTION INTRAVENOUS CONTINUOUS
Status: DISCONTINUED | OUTPATIENT
Start: 2018-10-22 | End: 2018-10-23

## 2018-10-22 RX ORDER — ONDANSETRON 2 MG/ML
4 INJECTION INTRAMUSCULAR; INTRAVENOUS EVERY 4 HOURS PRN
Status: DISCONTINUED | OUTPATIENT
Start: 2018-10-22 | End: 2018-10-25 | Stop reason: HOSPADM

## 2018-10-22 RX ORDER — ACETAMINOPHEN 325 MG/1
650 TABLET ORAL EVERY 6 HOURS PRN
Status: DISCONTINUED | OUTPATIENT
Start: 2018-10-22 | End: 2018-10-25 | Stop reason: HOSPADM

## 2018-10-22 RX ORDER — TAMSULOSIN HYDROCHLORIDE 0.4 MG/1
0.4 CAPSULE ORAL
Status: ON HOLD | COMMUNITY
End: 2018-12-18

## 2018-10-22 RX ORDER — AMOXICILLIN 250 MG
2 CAPSULE ORAL 2 TIMES DAILY
Status: DISCONTINUED | OUTPATIENT
Start: 2018-10-23 | End: 2018-10-25 | Stop reason: HOSPADM

## 2018-10-22 RX ORDER — TAMSULOSIN HYDROCHLORIDE 0.4 MG/1
0.4 CAPSULE ORAL
Status: DISCONTINUED | OUTPATIENT
Start: 2018-10-23 | End: 2018-10-25 | Stop reason: HOSPADM

## 2018-10-22 RX ORDER — AMIODARONE HYDROCHLORIDE 200 MG/1
200 TABLET ORAL 2 TIMES DAILY
Status: DISCONTINUED | OUTPATIENT
Start: 2018-10-22 | End: 2018-10-25 | Stop reason: HOSPADM

## 2018-10-22 RX ORDER — DEXTROSE MONOHYDRATE 25 G/50ML
25 INJECTION, SOLUTION INTRAVENOUS
Status: DISCONTINUED | OUTPATIENT
Start: 2018-10-22 | End: 2018-10-23

## 2018-10-22 RX ORDER — POLYETHYLENE GLYCOL 3350 17 G/17G
1 POWDER, FOR SOLUTION ORAL
Status: DISCONTINUED | OUTPATIENT
Start: 2018-10-22 | End: 2018-10-25 | Stop reason: HOSPADM

## 2018-10-22 RX ORDER — CLONIDINE HYDROCHLORIDE 0.1 MG/1
0.1 TABLET ORAL EVERY 6 HOURS PRN
Status: DISCONTINUED | OUTPATIENT
Start: 2018-10-22 | End: 2018-10-25 | Stop reason: HOSPADM

## 2018-10-22 ASSESSMENT — PAIN SCALES - GENERAL
PAINLEVEL_OUTOF10: 7
PAINLEVEL_OUTOF10: 6
PAINLEVEL_OUTOF10: 8

## 2018-10-22 ASSESSMENT — CHA2DS2 SCORE
HYPERTENSION: YES
PRIOR STROKE OR TIA OR THROMBOEMBOLISM: NO
AGE 75 OR GREATER: NO
CHF OR LEFT VENTRICULAR DYSFUNCTION: NO
VASCULAR DISEASE: NO
CHA2DS2 VASC SCORE: 2
DIABETES: YES
AGE 65 TO 74: NO
SEX: MALE

## 2018-10-22 ASSESSMENT — ENCOUNTER SYMPTOMS
EYES NEGATIVE: 1
CARDIOVASCULAR NEGATIVE: 1
NEUROLOGICAL NEGATIVE: 1
PSYCHIATRIC NEGATIVE: 1
FALLS: 1
RESPIRATORY NEGATIVE: 1
GASTROINTESTINAL NEGATIVE: 1
CONSTITUTIONAL NEGATIVE: 1

## 2018-10-22 ASSESSMENT — LIFESTYLE VARIABLES: EVER_SMOKED: NEVER

## 2018-10-23 LAB
ANION GAP SERPL CALC-SCNC: 6 MMOL/L (ref 0–11.9)
BASOPHILS # BLD AUTO: 0.6 % (ref 0–1.8)
BASOPHILS # BLD: 0.04 K/UL (ref 0–0.12)
BUN SERPL-MCNC: 21 MG/DL (ref 8–22)
CALCIUM SERPL-MCNC: 9.1 MG/DL (ref 8.5–10.5)
CHLORIDE SERPL-SCNC: 104 MMOL/L (ref 96–112)
CO2 SERPL-SCNC: 28 MMOL/L (ref 20–33)
CREAT SERPL-MCNC: 0.77 MG/DL (ref 0.5–1.4)
CRP SERPL HS-MCNC: 0.54 MG/DL (ref 0–0.75)
EOSINOPHIL # BLD AUTO: 0.18 K/UL (ref 0–0.51)
EOSINOPHIL NFR BLD: 2.9 % (ref 0–6.9)
ERYTHROCYTE [DISTWIDTH] IN BLOOD BY AUTOMATED COUNT: 45.2 FL (ref 35.9–50)
ERYTHROCYTE [SEDIMENTATION RATE] IN BLOOD BY WESTERGREN METHOD: 13 MM/HOUR (ref 0–20)
EST. AVERAGE GLUCOSE BLD GHB EST-MCNC: 134 MG/DL
GLUCOSE BLD-MCNC: 136 MG/DL (ref 65–99)
GLUCOSE BLD-MCNC: 99 MG/DL (ref 65–99)
GLUCOSE SERPL-MCNC: 109 MG/DL (ref 65–99)
HBA1C MFR BLD: 6.3 % (ref 0–5.6)
HCT VFR BLD AUTO: 36.1 % (ref 42–52)
HGB BLD-MCNC: 11.5 G/DL (ref 14–18)
IMM GRANULOCYTES # BLD AUTO: 0.04 K/UL (ref 0–0.11)
IMM GRANULOCYTES NFR BLD AUTO: 0.6 % (ref 0–0.9)
LYMPHOCYTES # BLD AUTO: 1.99 K/UL (ref 1–4.8)
LYMPHOCYTES NFR BLD: 31.8 % (ref 22–41)
MCH RBC QN AUTO: 28.5 PG (ref 27–33)
MCHC RBC AUTO-ENTMCNC: 31.9 G/DL (ref 33.7–35.3)
MCV RBC AUTO: 89.4 FL (ref 81.4–97.8)
MONOCYTES # BLD AUTO: 0.44 K/UL (ref 0–0.85)
MONOCYTES NFR BLD AUTO: 7 % (ref 0–13.4)
NEUTROPHILS # BLD AUTO: 3.56 K/UL (ref 1.82–7.42)
NEUTROPHILS NFR BLD: 57.1 % (ref 44–72)
NRBC # BLD AUTO: 0 K/UL
NRBC BLD-RTO: 0 /100 WBC
PLATELET # BLD AUTO: 258 K/UL (ref 164–446)
PMV BLD AUTO: 10.1 FL (ref 9–12.9)
POTASSIUM SERPL-SCNC: 4 MMOL/L (ref 3.6–5.5)
RBC # BLD AUTO: 4.04 M/UL (ref 4.7–6.1)
SODIUM SERPL-SCNC: 138 MMOL/L (ref 135–145)
WBC # BLD AUTO: 6.3 K/UL (ref 4.8–10.8)

## 2018-10-23 PROCEDURE — 80048 BASIC METABOLIC PNL TOTAL CA: CPT

## 2018-10-23 PROCEDURE — 85652 RBC SED RATE AUTOMATED: CPT

## 2018-10-23 PROCEDURE — 700105 HCHG RX REV CODE 258: Performed by: HOSPITALIST

## 2018-10-23 PROCEDURE — 86140 C-REACTIVE PROTEIN: CPT

## 2018-10-23 PROCEDURE — 85025 COMPLETE CBC W/AUTO DIFF WBC: CPT

## 2018-10-23 PROCEDURE — 700101 HCHG RX REV CODE 250: Performed by: INTERNAL MEDICINE

## 2018-10-23 PROCEDURE — A9270 NON-COVERED ITEM OR SERVICE: HCPCS | Performed by: INTERNAL MEDICINE

## 2018-10-23 PROCEDURE — 700102 HCHG RX REV CODE 250 W/ 637 OVERRIDE(OP): Performed by: HOSPITALIST

## 2018-10-23 PROCEDURE — 700101 HCHG RX REV CODE 250: Performed by: HOSPITALIST

## 2018-10-23 PROCEDURE — 770006 HCHG ROOM/CARE - MED/SURG/GYN SEMI*

## 2018-10-23 PROCEDURE — 99232 SBSQ HOSP IP/OBS MODERATE 35: CPT | Performed by: INTERNAL MEDICINE

## 2018-10-23 PROCEDURE — A9270 NON-COVERED ITEM OR SERVICE: HCPCS | Performed by: HOSPITALIST

## 2018-10-23 PROCEDURE — 700105 HCHG RX REV CODE 258: Performed by: INTERNAL MEDICINE

## 2018-10-23 PROCEDURE — 700102 HCHG RX REV CODE 250 W/ 637 OVERRIDE(OP): Performed by: INTERNAL MEDICINE

## 2018-10-23 PROCEDURE — 83036 HEMOGLOBIN GLYCOSYLATED A1C: CPT

## 2018-10-23 PROCEDURE — 36415 COLL VENOUS BLD VENIPUNCTURE: CPT

## 2018-10-23 PROCEDURE — 82962 GLUCOSE BLOOD TEST: CPT | Mod: 91

## 2018-10-23 RX ORDER — HYDROCODONE BITARTRATE AND ACETAMINOPHEN 5; 325 MG/1; MG/1
1 TABLET ORAL EVERY 4 HOURS PRN
Status: DISCONTINUED | OUTPATIENT
Start: 2018-10-23 | End: 2018-10-25 | Stop reason: HOSPADM

## 2018-10-23 RX ORDER — DOXYCYCLINE 100 MG/1
100 TABLET ORAL EVERY 12 HOURS
Status: DISCONTINUED | OUTPATIENT
Start: 2018-10-23 | End: 2018-10-25 | Stop reason: HOSPADM

## 2018-10-23 RX ADMIN — CLINDAMYCIN IN 5 PERCENT DEXTROSE 600 MG: 12 INJECTION, SOLUTION INTRAVENOUS at 00:29

## 2018-10-23 RX ADMIN — METFORMIN HYDROCHLORIDE 500 MG: 500 TABLET ORAL at 18:26

## 2018-10-23 RX ADMIN — AMIODARONE HYDROCHLORIDE 200 MG: 200 TABLET ORAL at 09:24

## 2018-10-23 RX ADMIN — DOXYCYCLINE 100 MG: 100 INJECTION, POWDER, LYOPHILIZED, FOR SOLUTION INTRAVENOUS at 11:17

## 2018-10-23 RX ADMIN — TAMSULOSIN HYDROCHLORIDE 0.4 MG: 0.4 CAPSULE ORAL at 09:24

## 2018-10-23 RX ADMIN — ACETAMINOPHEN 650 MG: 325 TABLET, FILM COATED ORAL at 00:25

## 2018-10-23 RX ADMIN — ACETAMINOPHEN 650 MG: 325 TABLET, FILM COATED ORAL at 09:25

## 2018-10-23 RX ADMIN — AMIODARONE HYDROCHLORIDE 200 MG: 200 TABLET ORAL at 00:24

## 2018-10-23 RX ADMIN — RIVAROXABAN 20 MG: 20 TABLET, FILM COATED ORAL at 00:25

## 2018-10-23 RX ADMIN — METOPROLOL TARTRATE 25 MG: 25 TABLET, FILM COATED ORAL at 09:24

## 2018-10-23 RX ADMIN — METOPROLOL TARTRATE 25 MG: 25 TABLET, FILM COATED ORAL at 00:25

## 2018-10-23 RX ADMIN — RIVAROXABAN 20 MG: 20 TABLET, FILM COATED ORAL at 18:28

## 2018-10-23 RX ADMIN — SODIUM CHLORIDE: 9 INJECTION, SOLUTION INTRAVENOUS at 00:27

## 2018-10-23 RX ADMIN — HYDROCODONE BITARTRATE AND ACETAMINOPHEN 1 TABLET: 5; 325 TABLET ORAL at 23:45

## 2018-10-23 RX ADMIN — PRAVASTATIN SODIUM 20 MG: 20 TABLET ORAL at 04:26

## 2018-10-23 RX ADMIN — DOXYCYCLINE 100 MG: 100 TABLET ORAL at 20:41

## 2018-10-23 RX ADMIN — ACETAMINOPHEN 650 MG: 325 TABLET, FILM COATED ORAL at 16:26

## 2018-10-23 RX ADMIN — HYDROCODONE BITARTRATE AND ACETAMINOPHEN 1 TABLET: 5; 325 TABLET ORAL at 18:26

## 2018-10-23 ASSESSMENT — ENCOUNTER SYMPTOMS
SINUS PAIN: 0
MYALGIAS: 0
NECK PAIN: 0
EYE DISCHARGE: 0
HEADACHES: 0
BLOOD IN STOOL: 0
COUGH: 0
CLAUDICATION: 0
CONSTIPATION: 0
BACK PAIN: 0
FOCAL WEAKNESS: 0
DIARRHEA: 0
SENSORY CHANGE: 0
NAUSEA: 0
VOMITING: 0
EYE PAIN: 0
DOUBLE VISION: 0
CHILLS: 0
ABDOMINAL PAIN: 0
PALPITATIONS: 0
FEVER: 0
SHORTNESS OF BREATH: 0
DEPRESSION: 0
INSOMNIA: 0

## 2018-10-23 ASSESSMENT — COGNITIVE AND FUNCTIONAL STATUS - GENERAL
CLIMB 3 TO 5 STEPS WITH RAILING: A LITTLE
DRESSING REGULAR LOWER BODY CLOTHING: A LITTLE
DAILY ACTIVITIY SCORE: 22
SUGGESTED CMS G CODE MODIFIER MOBILITY: CJ
MOBILITY SCORE: 21
HELP NEEDED FOR BATHING: A LITTLE
WALKING IN HOSPITAL ROOM: A LITTLE
SUGGESTED CMS G CODE MODIFIER DAILY ACTIVITY: CJ
STANDING UP FROM CHAIR USING ARMS: A LITTLE

## 2018-10-23 ASSESSMENT — COPD QUESTIONNAIRES
COPD SCREENING SCORE: 4
IN THE PAST 12 MONTHS DO YOU DO LESS THAN YOU USED TO BECAUSE OF YOUR BREATHING PROBLEMS: STRONGLY AGREE
DURING THE PAST 4 WEEKS HOW MUCH DID YOU FEEL SHORT OF BREATH: SOME OF THE TIME
DO YOU EVER COUGH UP ANY MUCUS OR PHLEGM?: NO/ONLY WITH OCCASIONAL COLDS OR INFECTIONS
HAVE YOU SMOKED AT LEAST 100 CIGARETTES IN YOUR ENTIRE LIFE: NO/DON'T KNOW

## 2018-10-23 ASSESSMENT — PAIN SCALES - GENERAL
PAINLEVEL_OUTOF10: 8
PAINLEVEL_OUTOF10: 0
PAINLEVEL_OUTOF10: 6
PAINLEVEL_OUTOF10: 6
PAINLEVEL_OUTOF10: 8
PAINLEVEL_OUTOF10: 6
PAINLEVEL_OUTOF10: 4
PAINLEVEL_OUTOF10: 8

## 2018-10-23 ASSESSMENT — LIFESTYLE VARIABLES: ALCOHOL_USE: NO

## 2018-10-23 NOTE — ED NOTES
Floor called and notified of transport, report to David.  Sebastián Matthew transported to S6 via gurney with transport. All personal belongings in possession.  NAD noted.

## 2018-10-23 NOTE — H&P
Hospital Medicine History & Physical Note    Date of Service  10/22/2018    Primary Care Physician  Jerad Lomax M.D.    Consultants  None    Code Status  Full code     Chief Complaint  Right lower extremity pain, left knee pain    History of Presenting Illness  53 y.o. male with history of atrial fibrillation with rate control and anticoagulation, diabetes mellitus unknown contrl and essential hypertension was in his usual state of health until the day of admission.  He reports that he was doing some yard work, when he turned and fell into a pile of rocks, injuring his legs. Due to pain, which was worse with movement and had no alleviating factors, he presented to this facility.  He gives a limited history, however outside medical records show that he was recently discharged with oral treatment for cellulitis after being admitted for the same.  No other complaints.     Review of Systems  Review of Systems   Constitutional: Negative.    HENT: Negative.    Eyes: Negative.    Respiratory: Negative.    Cardiovascular: Negative.    Gastrointestinal: Negative.    Genitourinary: Negative.    Musculoskeletal: Positive for falls and joint pain.   Skin: Positive for rash.   Neurological: Negative.    Endo/Heme/Allergies: Negative.    Psychiatric/Behavioral: Negative.        Past Medical History   has a past medical history of A-fib ([facility]); Chest pain; Diabetes ([facility]); Diabetic neuropathy ([facility]); Hypercholesterolemia; Hypertension; Morbid obesity ([facility]); Noncompliance; and Normal cardiac stress test.    Surgical History   has a past surgical history that includes toe amputation (Right, 11/10/2017); toe amputation (Right, 1/17/2018); irrigation & debridement ortho (Right, 2/19/2018); and cardiac cath (07/21/2018).     Family History  family history includes No Known Problems in his father and mother.     Social History   reports that he has never smoked. He has never used smokeless tobacco. He reports that he does not drink alcohol  or use drugs.    Allergies  Allergies   Allergen Reactions   • Daptomycin Rash     Body rash  RXN=1/2018     • Pcn [Penicillins] Hives and Rash      Rash & hives  RXN=since a kid   • Unasyn [Ampicillin-Sulbactam Sodium] Hives, Itching and Swelling   • Vancomycin Rash     Red man syndrome       Medications  Prior to Admission Medications   Prescriptions Last Dose Informant Patient Reported? Taking?   amiodarone (CORDARONE) 200 MG Tab 10/22/2018 at Unknown time  No No   Sig: Take 1 Tab by mouth 2 Times a Day.   clotrimazole (LOTRIMIN) 1 % Cream 10/22/2018 at Unknown time  No No   Sig: Apply 1 Application to affected area(s) 2 times a day.   metoprolol (LOPRESSOR) 25 MG Tab 10/22/2018 at Unknown time  No No   Sig: Take 1 Tab by mouth 2 times a day.   pravastatin (PRAVACHOL) 20 MG Tab 10/22/2018 at Unknown time  No No   Sig: Take 1 Tab by mouth every day.   rivaroxaban (XARELTO) 20 MG Tab tablet 10/22/2018 at Unknown time  No No   Sig: Take 1 Tab by mouth with dinner.   tamsulosin (FLOMAX) 0.4 MG capsule 10/22/2018 at Unknown time  Yes Yes   Sig: Take 0.4 mg by mouth ONE-HALF HOUR AFTER BREAKFAST.      Facility-Administered Medications: None       Physical Exam  Temp:  [36.8 °C (98.3 °F)] 36.8 °C (98.3 °F)  Pulse:  [75-92] 92  Resp:  [14-22] 18  BP: (132)/(84) 132/84    Physical Exam   Constitutional: He is oriented to person, place, and time. He appears well-developed and well-nourished. No distress.   HENT:   Head: Normocephalic and atraumatic.   Eyes: Pupils are equal, round, and reactive to light. Conjunctivae are normal.   Neck: Normal range of motion. Neck supple. No tracheal deviation present. No thyromegaly present.   Cardiovascular: Normal rate, regular rhythm and normal heart sounds.  Exam reveals no gallop and no friction rub.    No murmur heard.  Pulmonary/Chest: Effort normal and breath sounds normal. No respiratory distress. He has no wheezes.   Abdominal: Soft. Bowel sounds are normal. He exhibits no  distension and no mass. There is tenderness. There is no rebound and no guarding.   Musculoskeletal: Normal range of motion. He exhibits edema, tenderness and deformity.   Lymphadenopathy:     He has no cervical adenopathy.   Neurological: He is alert and oriented to person, place, and time. No cranial nerve deficit.   Skin: Skin is warm and dry. He is not diaphoretic.   Psychiatric: He has a normal mood and affect.   Nursing note and vitals reviewed.      Laboratory:  Recent Labs      10/22/18   2100   WBC  9.5   RBC  4.18*   HEMOGLOBIN  11.9*   HEMATOCRIT  37.3*   MCV  89.2   MCH  28.5   MCHC  31.9*   RDW  45.5   PLATELETCT  290   MPV  10.5         No results for input(s): ALTSGPT, ASTSGOT, ALKPHOSPHAT, TBILIRUBIN, DBILIRUBIN, GAMMAGT, AMYLASE, LIPASE, ALB, PREALBUMIN, GLUCOSE in the last 72 hours.              No results for input(s): TROPONINI in the last 72 hours.    Urinalysis:    No results found     Imaging:  DX-KNEE COMPLETE 4+ LEFT   Final Result      1.  Moderate degenerative change of LEFT knee.   2.  No fracture or dislocation.      DX-CHEST-PORTABLE (1 VIEW)   Final Result      Hypoinflation without other evidence for acute cardiopulmonary disease.      US-EXTREMITY VENOUS LOWER BILAT    (Results Pending)         Assessment/Plan:  I anticipate this patient will require at least two midnights for appropriate medical management, necessitating inpatient admission.    * Cellulitis   Assessment & Plan    Lower extremity right greater than left.  Start IV antibiotics.  Poor compliance with oral medication regimen after recent discharge.  Check ESR and CRP.          Paroxysmal A-fib (HCC)- (present on admission)   Assessment & Plan    On rate control.  Anticoagulated.         Lower limb amputation, great toe (HCC)- (present on admission)   Assessment & Plan    Stable.         Class 2 obesity due to excess calories without serious comorbidity with body mass index (BMI) of 36.0 to 36.9 in adult   Assessment &  Plan    Body mass index is 36.62 kg/m².          Essential hypertension- (present on admission)   Assessment & Plan    Controlled with current medication regimen.  Monitor.         Type 2 diabetes mellitus, without long-term current use of insulin, with peripheral neuropathy (HCC)- (present on admission)   Assessment & Plan    Unclear control.  Monitor with insulin therapy as needed.             VTE prophylaxis: SCD, lovenox

## 2018-10-23 NOTE — PROGRESS NOTES
Assumed care at 0715. Pt A/Ox4. Pt 1 assist with cane. Assessment performed, on room air. Bed locked, in lowest position, call light and personal belongings within reach, treaded socks on, instructed to call for assistance.

## 2018-10-23 NOTE — ED NOTES
Med rec updated and complete.  Allergies reviewed.  Pt reports no antibiotic use in last 30 days at home.  All am doses taken.

## 2018-10-23 NOTE — PROGRESS NOTES
[facility]    Date of Service: 10/23/2018    Chief Complaint  53 y.o. male admitted 10/22/2018 with bilateral lower extremity cellulitis.    Interval Problem Update  Patient's IV clindamycin changed to IV doxycycline.  Continue patient on IV antibiotics and monitor for clinical signs of improvement.    Consultants/Specialty  None    Disposition  TBD        Review of Systems   Constitutional: Negative for chills and fever.   HENT: Negative for hearing loss and sinus pain.    Eyes: Negative for double vision, pain and discharge.   Respiratory: Negative for cough and shortness of breath.    Cardiovascular: Positive for leg swelling (chronic). Negative for chest pain, palpitations and claudication.   Gastrointestinal: Negative for abdominal pain, blood in stool, constipation, diarrhea, nausea and vomiting.   Genitourinary: Negative for dysuria, frequency, hematuria and urgency.   Musculoskeletal: Positive for joint pain (B/L LE). Negative for back pain, myalgias and neck pain.   Skin: Positive for rash. Negative for itching.   Neurological: Negative for sensory change, focal weakness and headaches.   Psychiatric/Behavioral: Negative for depression. The patient does not have insomnia.       Physical Exam  Laboratory/Imaging   Hemodynamics  Temp (24hrs), Av.6 °C (97.9 °F), Min:36.2 °C (97.1 °F), Max:36.8 °C (98.3 °F)   Temperature: 36.8 °C (98.3 °F)  Pulse  Av.8  Min: 73  Max: 92 Heart Rate (Monitored): 93  Blood Pressure: 118/72, NIBP: 131/83      Respiratory      Respiration: 18, Pulse Oximetry: 94 %             Fluids    Intake/Output Summary (Last 24 hours) at 10/23/18 1012  Last data filed at 10/23/18 0525   Gross per 24 hour   Intake              260 ml   Output              275 ml   Net              -15 ml       Nutrition  Orders Placed This Encounter   Procedures   • Diet Order Regular     Standing Status:   Standing     Number of Occurrences:   1     Order Specific Question:   Diet:      Answer:   Regular [1]     Physical Exam   Constitutional: He is oriented to person, place, and time. No distress.   Morbidly obese male   HENT:   Head: Normocephalic and atraumatic.   Right Ear: External ear normal.   Left Ear: External ear normal.   Eyes: Conjunctivae are normal. Right eye exhibits no discharge. Left eye exhibits no discharge.   Neck: Normal range of motion. Neck supple.   Cardiovascular: Normal rate and regular rhythm.    No murmur heard.  Pulmonary/Chest: Effort normal. No stridor. He has no wheezes. He has no rales.   Distant breath sounds secondary to body habitus   Abdominal: Soft. Bowel sounds are normal. He exhibits no distension. There is no tenderness. There is no rebound.   Rotund   Musculoskeletal: He exhibits edema (B/L LE).   Neurological: He is alert and oriented to person, place, and time. No cranial nerve deficit.   Skin: Skin is warm and dry. He is not diaphoretic. There is erythema.   Erythema noted bilateral lower extremities, no discharge or tenderness to palpation.   Psychiatric: He has a normal mood and affect. His behavior is normal. Thought content normal.       Recent Labs      10/22/18   2100  10/23/18   0802   WBC  9.5  6.3   RBC  4.18*  4.04*   HEMOGLOBIN  11.9*  11.5*   HEMATOCRIT  37.3*  36.1*   MCV  89.2  89.4   MCH  28.5  28.5   MCHC  31.9*  31.9*   RDW  45.5  45.2   PLATELETCT  290  258   MPV  10.5  10.1     Recent Labs      10/22/18   2100  10/23/18   0802   SODIUM  137  138   POTASSIUM  4.0  4.0   CHLORIDE  102  104   CO2  27  28   GLUCOSE  115*  109*   BUN  26*  21   CREATININE  0.88  0.77   CALCIUM  9.4  9.1     Recent Labs      10/22/18   2100   APTT  30.7   INR  1.30*     Recent Labs      10/22/18   2100   BNPBTYPENAT  217*              Assessment/Plan     * Cellulitis   Assessment & Plan    Lower extremity right greater than left. Continue IV antibiotics and monitor.  Poor compliance with oral medication regimen after recent discharge.   Ultrasound lower  extremity negative for acute DVT.        Paroxysmal A-fib (HCC)- (present on admission)   Assessment & Plan    On rate control.  Anticoagulated with Xarelto        Lower limb amputation, great toe (HCC)- (present on admission)   Assessment & Plan    Stable.         Class 2 obesity due to excess calories without serious comorbidity with body mass index (BMI) of 36.0 to 36.9 in adult   Assessment & Plan    Body mass index is 36.62 kg/m².          Essential hypertension- (present on admission)   Assessment & Plan    Controlled with current medication regimen.  Monitor.         Type 2 diabetes mellitus, without long-term current use of insulin, with peripheral neuropathy (HCC)- (present on admission)   Assessment & Plan    Unclear control.  Monitor with insulin therapy as needed.           Quality-Core Measures   Reviewed items::  Labs reviewed, Radiology images reviewed and Medications reviewed  Zabala catheter::  No Zabala  DVT: Xarelto.  Antibiotics:  Treating active infection/contamination beyond 24 hours perioperative coverage

## 2018-10-23 NOTE — ED TRIAGE NOTES
OLESYA Bolaños to triage c/o left knee pain s/p GLF today.  Pt reportedly was seen & discharged from [facility] today for same.  Pt also c/o bilateral lower leg redness/swelling; pt was dx with cellulitis but never got antibiotics.

## 2018-10-23 NOTE — DIETARY
Nutrition services: Day 1 of admit.  Sebastián Castro is a 53 y.o. male with admitting DX of Cellulitis.     Poor PO noted on admit screen.  Attempted to speak with pt at bedside, but pt was sleeping and did not rouse to name.  Pt appeared well nourished.  No meals recorded in chart to assess intake. Pt with hx of T2DM, will adjust current diet order to Diabetic to maintain consistent blood sugar levels.    Assessment:  Height: 182.9 cm (6')  Weight: (!) 125.3 kg (276 lb 3.8 oz)  Body mass index is 37.46 kg/m².   Diet/Intake: Regular; no PO recorded in chart    Evaluation:   1. Glucose 109, HbA1c 6.3 (10/23)  2. Pertinent meds:  Amiodarone, Doxycycline, Metformin, Pericolace    Recommendations/Plan:  1. Encourage intake of meals.  2. Document intake of all meals as % taken in ADLs to provide interdisciplinary communication across all shifts.   3. Monitor weight.  4. Nutrition rep will continue to see patient for ongoing meal and snack preferences.

## 2018-10-23 NOTE — PROGRESS NOTES
2 RN skin check complete when pt arrived to floor. Pt has pitting edema to BLE that is red and shiny. His groin is slightly red and pink, has scattered bruising and scabs. Other wise skin is clean dry and intact.

## 2018-10-23 NOTE — PROGRESS NOTES
Assumed care of pt. Hx of fall prior to admission bed alarm on and pt instructed to call if needs to get up.. Verbalized understanding. Call light with in reach and hourly rounding started.

## 2018-10-23 NOTE — CARE PLAN
Problem: Safety  Goal: Will remain free from injury  Outcome: PROGRESSING AS EXPECTED  Dicussed safety measures with pt. Pt verbalized understanding. Pt using call light appropriately.     Problem: Infection  Goal: Will remain free from infection  Outcome: PROGRESSING AS EXPECTED  Hand hygiene performed before and after care. Antibiotics administered per MAR, pt tolerating well.

## 2018-10-23 NOTE — ED NOTES
[place] fall assessment completed. Pt is High risk for fall. Interventions complete. Bed locked in low position, call light in place. Personal possessions in place.  Personal needs assessed. Will move pt to a more visible room if available. Safety assessed. Will monitor frequently.

## 2018-10-23 NOTE — ASSESSMENT & PLAN NOTE
Lower extremity right greater than left.IV antibiotics dc'ed on PO doxycycline and monitor.  Poor compliance with oral medication regimen after recent discharge.   Ultrasound lower extremity negative for acute DVT.  PT following

## 2018-10-23 NOTE — ED NOTES
Pt to Y57 via wheelchair.  Agree with triage assessment.  Pt changed into gown.  Chart up for ERP.

## 2018-10-23 NOTE — ED PROVIDER NOTES
ED Provider Note    Scribed for Katrin Tafoya M.D. by Glenna Ortiz. 10/22/2018  8:00 PM    Primary care provider: Jerad Lomax M.D.  Means of arrival: EMS  History obtained from: patient  History limited by: none    CHIEF COMPLAINT  Chief Complaint   Patient presents with   • T-5000 GLF     earlier today   • Leg Swelling     bilateral lower legs; dx cellulitis   • Knee Pain     left knee from fall       HPI  Sebastián Castro is a 53 y.o. male with a history of afib, CHF, diabetes, hypertension, who presents to the Emergency Department for evaluation following a ground level fall experienced earlier today. Patient reports that he landed mainly on his left knee, scraping is along rocks. Other than left knee pain, patient has no other complaints. Patient is currently taking daily Xarelto secondary to atrial fibrillation. He normally ambulates at home with a cane. No complaints of any motor or sensory changes in the lower extremities or changes in chronic lower leg swelling.    REVIEW OF SYSTEMS  Neuro: no sensory or motor changes lower extremities  Musculoskeletal:  Left knee pain, no change with bilateral lower extremity swelling  SKIN: abrasion to left knee    See history of present illness. All other systems are negative. C.    PAST MEDICAL HISTORY   has a past medical history of A-fib (HCC); Chest pain; Diabetes (HCC); Diabetic neuropathy (HCC); Hypercholesterolemia; Hypertension; Morbid obesity (HCC); Noncompliance; and Normal cardiac stress test.    SURGICAL HISTORY   has a past surgical history that includes toe amputation (Right, 11/10/2017); toe amputation (Right, 1/17/2018); irrigation & debridement ortho (Right, 2/19/2018); and cardiac cath (07/21/2018).    SOCIAL HISTORY  Social History   Substance Use Topics   • Smoking status: Never Smoker   • Smokeless tobacco: Never Used   • Alcohol use No      History   Drug Use No       FAMILY HISTORY  No family history of cardiac problems    CURRENT MEDICATIONS  No  current facility-administered medications for this encounter.     Current Outpatient Prescriptions:   •  tamsulosin (FLOMAX) 0.4 MG capsule, Take 0.4 mg by mouth ONE-HALF HOUR AFTER BREAKFAST., Disp: , Rfl:   •  clotrimazole (LOTRIMIN) 1 % Cream, Apply 1 Application to affected area(s) 2 times a day., Disp: 1 Tube, Rfl: 0  •  metoprolol (LOPRESSOR) 25 MG Tab, Take 1 Tab by mouth 2 times a day., Disp: 180 Tab, Rfl: 11  •  amiodarone (CORDARONE) 200 MG Tab, Take 1 Tab by mouth 2 Times a Day., Disp: 60 Tab, Rfl: 11  •  rivaroxaban (XARELTO) 20 MG Tab tablet, Take 1 Tab by mouth with dinner., Disp: 90 Tab, Rfl: 3  •  pravastatin (PRAVACHOL) 20 MG Tab, Take 1 Tab by mouth every day., Disp: 90 Tab, Rfl: 3    ALLERGIES  Allergies   Allergen Reactions   • Daptomycin Rash     Body rash  RXN=1/2018     • Pcn [Penicillins] Hives and Rash      Rash & hives  RXN=since a kid   • Unasyn [Ampicillin-Sulbactam Sodium] Hives, Itching and Swelling   • Vancomycin Rash     Red man syndrome       PHYSICAL EXAM  VITAL SIGNS: /84   Pulse 90   Temp 36.8 °C (98.3 °F)   Resp 14   Ht 1.829 m (6')   Wt 122.5 kg (270 lb)   SpO2 98%   BMI 36.62 kg/m²     Constitutional: Well developed, Well nourished, No acute distress, Non-toxic appearance.   HEENT: Normocephalic, Atraumatic,  external ears normal, pharynx pink,  Mucous  Membranes moist, No rhinorrhea or mucosal edema  Eyes: PERRL, EOMI, Conjunctiva normal, No discharge.   Neck: Normal range of motion, No tenderness, Supple, No stridor.   Lymphatic: No lymphadenopathy    Cardiovascular:irregularly irregular, No murmurs,  rubs, or gallops.   Thorax & Lungs: Lungs clear to auscultation bilaterally, No respiratory distress, No wheezes, rhales or rhonchi, No chest wall tenderness.   Abdomen: Bowel sounds normal, Soft, non tender, non distended,  No pulsatile masses., no rebound guarding or peritoneal signs.   Skin: Warm, Dry  Neurologic: Alert & oriented x 3, Normal motor function,  Normal sensory function, No focal deficits noted. Normal reflexes. Normal Cranial Nerves.  Extremities: bilateral 3+ LE edema, abrasion to anterior left knee with tenderness, FROM, brawny edema of lower extremities with skin flaking, Equal, intact distal pulses, No cyanosis, clubbing  Musculoskeletal: Good range of motion in all major joints.    DIAGNOSTIC STUDIES / PROCEDURES    LABS  Results for orders placed or performed during the hospital encounter of 10/22/18   CBC with Differential   Result Value Ref Range    WBC 9.5 4.8 - 10.8 K/uL    RBC 4.18 (L) 4.70 - 6.10 M/uL    Hemoglobin 11.9 (L) 14.0 - 18.0 g/dL    Hematocrit 37.3 (L) 42.0 - 52.0 %    MCV 89.2 81.4 - 97.8 fL    MCH 28.5 27.0 - 33.0 pg    MCHC 31.9 (L) 33.7 - 35.3 g/dL    RDW 45.5 35.9 - 50.0 fL    Platelet Count 290 164 - 446 K/uL    MPV 10.5 9.0 - 12.9 fL    Neutrophils-Polys 65.30 44.00 - 72.00 %    Lymphocytes 22.80 22.00 - 41.00 %    Monocytes 7.10 0.00 - 13.40 %    Eosinophils 3.50 0.00 - 6.90 %    Basophils 0.50 0.00 - 1.80 %    Immature Granulocytes 0.80 0.00 - 0.90 %    Nucleated RBC 0.00 /100 WBC    Neutrophils (Absolute) 6.21 1.82 - 7.42 K/uL    Lymphs (Absolute) 2.17 1.00 - 4.80 K/uL    Monos (Absolute) 0.68 0.00 - 0.85 K/uL    Eos (Absolute) 0.33 0.00 - 0.51 K/uL    Baso (Absolute) 0.05 0.00 - 0.12 K/uL    Immature Granulocytes (abs) 0.08 0.00 - 0.11 K/uL    NRBC (Absolute) 0.00 K/uL   EKG   Result Value Ref Range    Report       [facility]    Test Date:  2018-10-22  Pt Name:    EMELY FLORES                Department: ER  MRN:        [id number]                      Room:       [facility]  Gender:     Male                         Technician: [id number]  :        1965                   Requested By:YOVANI DEE  Order #:    [id number]                    Reading MD:    Measurements  Intervals                                Axis  Rate:       87                           P:  CO:                                       QRS:        30  QRSD:       98                           T:          62  QT:         408  QTc:        491    Interpretive Statements  ATRIAL FIBRILLATION, V-RATE    VENTRICULAR PREMATURE COMPLEX  LOW VOLTAGE IN FRONTAL LEADS  BORDERLINE R WAVE PROGRESSION, ANTERIOR LEADS  BORDERLINE PROLONGED QT INTERVAL  Compared to ECG 10/13/2018 05:26:19  Ventricular premature complex(es) now present  Intraventricular conduction delay no longer present     EKG reviewed by myself 10/22/18    All labs reviewed by me.    EKG  12 lead EKG; Interpreted by emergency department physician as above    RADIOLOGY  DX-KNEE COMPLETE 4+ LEFT   Final Result      1.  Moderate degenerative change of LEFT knee.   2.  No fracture or dislocation.      DX-CHEST-PORTABLE (1 VIEW)   Final Result      Hypoinflation without other evidence for acute cardiopulmonary disease.      US-EXTREMITY VENOUS LOWER BILAT    (Results Pending)     The radiologist's interpretation of all radiological studies have been reviewed by me.    COURSE & MEDICAL DECISION MAKING  Nursing notes, VS, PMSFHx reviewed in chart.    40% LVE in July 2018. Hx of CHF, low ejection fraction.    8:00 PM Patient seen and examined at bedside. Ordered extremity US, knee xray, chest xray, CBC, CMP, BNP, PTINR, APTT, lipase, troponin, EKG to evaluate his symptoms. The differential diagnoses include but are not limited to: worsening CHF, knee injury. I informed the patient that I would be evaluate for both musculoskeletal injury as well as cardiac origin of his symptoms. Patient understands and agrees with treatment plan.    9:32 PM pt will be admitted for weakness and falls on blood thinners with chronic CHF. I spoke to Dr Wheeler who accepts the patient for admission.     FINAL IMPRESSION  1. Fall, initial encounter    2. Sprain of left knee, unspecified ligament, initial encounter    3. Weakness          I, Glenna Ortiz (Scribe), am scribing for, and in the presence of, Katrin ELLIS  JEFFREY Tafoya.    Electronically signed by: Glenna Ortiz (Scribe), 10/22/2018    IKatrin M.D. personally performed the services described in this documentation, as scribed by Glenna Ortiz in my presence, and it is both accurate and complete.    The note accurately reflects work and decisions made by me.  Katrin Tafoya  10/22/2018  9:32 PM

## 2018-10-23 NOTE — PROGRESS NOTES
"Pt requesting pain mediation. Pt states pain 8/10 and \"I let it get out of control.\" RN asked pt if he thought tylenol would help, as it is order for pain that is 1-3/10. Pt requesting that I ask the doctor for something to better address the pain, pt states he prefers tramadol.    "

## 2018-10-24 LAB
ALBUMIN SERPL BCP-MCNC: 3 G/DL (ref 3.2–4.9)
ALBUMIN/GLOB SERPL: 0.9 G/DL
ALP SERPL-CCNC: 98 U/L (ref 30–99)
ALT SERPL-CCNC: 26 U/L (ref 2–50)
ANION GAP SERPL CALC-SCNC: 8 MMOL/L (ref 0–11.9)
AST SERPL-CCNC: 17 U/L (ref 12–45)
BILIRUB SERPL-MCNC: 0.5 MG/DL (ref 0.1–1.5)
BUN SERPL-MCNC: 21 MG/DL (ref 8–22)
CALCIUM SERPL-MCNC: 8.9 MG/DL (ref 8.5–10.5)
CHLORIDE SERPL-SCNC: 104 MMOL/L (ref 96–112)
CO2 SERPL-SCNC: 26 MMOL/L (ref 20–33)
CREAT SERPL-MCNC: 0.89 MG/DL (ref 0.5–1.4)
ERYTHROCYTE [DISTWIDTH] IN BLOOD BY AUTOMATED COUNT: 45.5 FL (ref 35.9–50)
GLOBULIN SER CALC-MCNC: 3.4 G/DL (ref 1.9–3.5)
GLUCOSE SERPL-MCNC: 92 MG/DL (ref 65–99)
HCT VFR BLD AUTO: 35.4 % (ref 42–52)
HGB BLD-MCNC: 11.3 G/DL (ref 14–18)
MCH RBC QN AUTO: 28.5 PG (ref 27–33)
MCHC RBC AUTO-ENTMCNC: 31.9 G/DL (ref 33.7–35.3)
MCV RBC AUTO: 89.4 FL (ref 81.4–97.8)
PLATELET # BLD AUTO: 251 K/UL (ref 164–446)
PMV BLD AUTO: 10.5 FL (ref 9–12.9)
POTASSIUM SERPL-SCNC: 4 MMOL/L (ref 3.6–5.5)
PROT SERPL-MCNC: 6.4 G/DL (ref 6–8.2)
RBC # BLD AUTO: 3.96 M/UL (ref 4.7–6.1)
SODIUM SERPL-SCNC: 138 MMOL/L (ref 135–145)
WBC # BLD AUTO: 6.2 K/UL (ref 4.8–10.8)

## 2018-10-24 PROCEDURE — G8978 MOBILITY CURRENT STATUS: HCPCS | Mod: CI

## 2018-10-24 PROCEDURE — 80053 COMPREHEN METABOLIC PANEL: CPT

## 2018-10-24 PROCEDURE — G8979 MOBILITY GOAL STATUS: HCPCS | Mod: CI

## 2018-10-24 PROCEDURE — A9270 NON-COVERED ITEM OR SERVICE: HCPCS | Performed by: INTERNAL MEDICINE

## 2018-10-24 PROCEDURE — 97162 PT EVAL MOD COMPLEX 30 MIN: CPT

## 2018-10-24 PROCEDURE — 700111 HCHG RX REV CODE 636 W/ 250 OVERRIDE (IP): Performed by: HOSPITALIST

## 2018-10-24 PROCEDURE — 700102 HCHG RX REV CODE 250 W/ 637 OVERRIDE(OP): Performed by: HOSPITALIST

## 2018-10-24 PROCEDURE — 36415 COLL VENOUS BLD VENIPUNCTURE: CPT

## 2018-10-24 PROCEDURE — 700102 HCHG RX REV CODE 250 W/ 637 OVERRIDE(OP): Performed by: INTERNAL MEDICINE

## 2018-10-24 PROCEDURE — 99232 SBSQ HOSP IP/OBS MODERATE 35: CPT | Performed by: INTERNAL MEDICINE

## 2018-10-24 PROCEDURE — 85027 COMPLETE CBC AUTOMATED: CPT

## 2018-10-24 PROCEDURE — G8980 MOBILITY D/C STATUS: HCPCS | Mod: CI

## 2018-10-24 PROCEDURE — A9270 NON-COVERED ITEM OR SERVICE: HCPCS | Performed by: HOSPITALIST

## 2018-10-24 PROCEDURE — 770006 HCHG ROOM/CARE - MED/SURG/GYN SEMI*

## 2018-10-24 RX ADMIN — PRAVASTATIN SODIUM 20 MG: 20 TABLET ORAL at 05:25

## 2018-10-24 RX ADMIN — HYDROCODONE BITARTRATE AND ACETAMINOPHEN 1 TABLET: 5; 325 TABLET ORAL at 05:25

## 2018-10-24 RX ADMIN — METFORMIN HYDROCHLORIDE 500 MG: 500 TABLET ORAL at 08:57

## 2018-10-24 RX ADMIN — HYDROCODONE BITARTRATE AND ACETAMINOPHEN 1 TABLET: 5; 325 TABLET ORAL at 23:18

## 2018-10-24 RX ADMIN — METOPROLOL TARTRATE 25 MG: 25 TABLET, FILM COATED ORAL at 05:25

## 2018-10-24 RX ADMIN — AMIODARONE HYDROCHLORIDE 200 MG: 200 TABLET ORAL at 17:42

## 2018-10-24 RX ADMIN — AMIODARONE HYDROCHLORIDE 200 MG: 200 TABLET ORAL at 05:25

## 2018-10-24 RX ADMIN — HYDROCODONE BITARTRATE AND ACETAMINOPHEN 1 TABLET: 5; 325 TABLET ORAL at 10:56

## 2018-10-24 RX ADMIN — HYDROCODONE BITARTRATE AND ACETAMINOPHEN 1 TABLET: 5; 325 TABLET ORAL at 17:42

## 2018-10-24 RX ADMIN — SENNOSIDES AND DOCUSATE SODIUM 2 TABLET: 8.6; 5 TABLET ORAL at 17:42

## 2018-10-24 RX ADMIN — DOXYCYCLINE 100 MG: 100 TABLET ORAL at 17:42

## 2018-10-24 RX ADMIN — TAMSULOSIN HYDROCHLORIDE 0.4 MG: 0.4 CAPSULE ORAL at 08:57

## 2018-10-24 RX ADMIN — ONDANSETRON 4 MG: 2 INJECTION INTRAMUSCULAR; INTRAVENOUS at 20:28

## 2018-10-24 RX ADMIN — RIVAROXABAN 20 MG: 20 TABLET, FILM COATED ORAL at 17:42

## 2018-10-24 RX ADMIN — METFORMIN HYDROCHLORIDE 500 MG: 500 TABLET ORAL at 17:42

## 2018-10-24 RX ADMIN — DOXYCYCLINE 100 MG: 100 TABLET ORAL at 05:25

## 2018-10-24 RX ADMIN — METOPROLOL TARTRATE 25 MG: 25 TABLET, FILM COATED ORAL at 17:42

## 2018-10-24 ASSESSMENT — ENCOUNTER SYMPTOMS
DEPRESSION: 0
CLAUDICATION: 0
SHORTNESS OF BREATH: 0
DOUBLE VISION: 0
SENSORY CHANGE: 0
BACK PAIN: 0
COUGH: 0
SINUS PAIN: 0
HEADACHES: 0
PALPITATIONS: 0
FEVER: 0
ABDOMINAL PAIN: 0
NECK PAIN: 0
CONSTIPATION: 0
INSOMNIA: 0
EYE DISCHARGE: 0
FOCAL WEAKNESS: 0
CHILLS: 0
EYE PAIN: 0

## 2018-10-24 ASSESSMENT — GAIT ASSESSMENTS
GAIT LEVEL OF ASSIST: SUPERVISED
DISTANCE (FEET): 200
ASSISTIVE DEVICE: SINGLE POINT CANE

## 2018-10-24 ASSESSMENT — COGNITIVE AND FUNCTIONAL STATUS - GENERAL
SUGGESTED CMS G CODE MODIFIER MOBILITY: CH
MOBILITY SCORE: 24

## 2018-10-24 ASSESSMENT — PAIN SCALES - GENERAL
PAINLEVEL_OUTOF10: 4
PAINLEVEL_OUTOF10: 6
PAINLEVEL_OUTOF10: 6
PAINLEVEL_OUTOF10: 4
PAINLEVEL_OUTOF10: 8
PAINLEVEL_OUTOF10: 7
PAINLEVEL_OUTOF10: 0

## 2018-10-24 NOTE — CARE PLAN
Problem: Safety  Goal: Will remain free from falls    Intervention: Implement fall precautions  Pt assist x1 with cane, pain with movement LLE, swelling BLE. Educated pt to call for assistance, calls appropriately. Fall precautions in place; bed lowest position, treaded socks on, call light within reach, bed alarm on.      Problem: Pain Management  Goal: Pain level will decrease to patient's comfort goal    Intervention: Follow pain managment plan developed in collaboration with patient and Interdisciplinary Team  Pt complains of pain to LLE, medicated per MAR. Pt has PRN [place] available for pain control. Tylenol also on board.

## 2018-10-24 NOTE — PROGRESS NOTES
[facility] Progress Note    Date of Service: 10/24/2018    Chief Complaint  53 y.o. male admitted 10/22/2018 with bilateral lower extremity cellulitis.    Interval Problem Update  10/23 - Patient's IV clindamycin changed to IV doxycycline.  Continue patient on IV antibiotics and monitor for clinical signs of improvement.    10/24 -patient transition from IV to p.o. doxycycline, tolerating.  Patient's lower extremity cellulitis improving.    Consultants/Specialty  None    Disposition  TBD-likely discharge home in 1-2 days.        Review of Systems   Constitutional: Negative for chills and fever.   HENT: Negative for hearing loss and sinus pain.    Eyes: Negative for double vision, pain and discharge.   Respiratory: Negative for cough and shortness of breath.    Cardiovascular: Positive for leg swelling (chronic, at baseline). Negative for chest pain, palpitations and claudication.   Gastrointestinal: Negative for abdominal pain and constipation.   Genitourinary: Negative.    Musculoskeletal: Positive for joint pain (B/L LE). Negative for back pain and neck pain.   Skin: Positive for rash. Negative for itching.   Neurological: Negative for sensory change, focal weakness and headaches.   Psychiatric/Behavioral: Negative for depression. The patient does not have insomnia.       Physical Exam  Laboratory/Imaging   Hemodynamics  Temp (24hrs), Av.4 °C (97.6 °F), Min:36.2 °C (97.1 °F), Max:36.9 °C (98.4 °F)   Temperature: 36.4 °C (97.5 °F)  Pulse  Av.1  Min: 53  Max: 92    Blood Pressure: 155/88      Respiratory      Respiration: 18, Pulse Oximetry: 90 %        RUL Breath Sounds: Clear, RML Breath Sounds: Clear, RLL Breath Sounds: Diminished, ELIAS Breath Sounds: Clear, LLL Breath Sounds: Diminished    Fluids    Intake/Output Summary (Last 24 hours) at 10/24/18 1235  Last data filed at 10/24/18 0900   Gross per 24 hour   Intake              800 ml   Output              910 ml   Net             -110 ml        Nutrition  Orders Placed This Encounter   Procedures   • Diet Order Diabetic     Standing Status:   Standing     Number of Occurrences:   1     Order Specific Question:   Diet:     Answer:   Diabetic [3]     Physical Exam   Constitutional: He is oriented to person, place, and time. No distress.   Morbidly obese male   HENT:   Head: Normocephalic and atraumatic.   Right Ear: External ear normal.   Left Ear: External ear normal.   Mouth/Throat: Oropharynx is clear and moist.   Eyes: Conjunctivae are normal. Right eye exhibits no discharge. Left eye exhibits no discharge.   Neck: Normal range of motion. Neck supple.   Cardiovascular: Normal rate and regular rhythm.    No murmur heard.  Pulmonary/Chest: Effort normal. No stridor. He has no wheezes. He has no rales.   Distant breath sounds secondary to body habitus   Abdominal: Soft. Bowel sounds are normal. He exhibits no distension. There is no tenderness. There is no rebound.   Rotund   Musculoskeletal: He exhibits edema (B/L LE).   Neurological: He is alert and oriented to person, place, and time.   Skin: Skin is warm and dry. He is not diaphoretic. There is erythema.   Erythema noted bilateral lower extremities, no discharge or tenderness to palpation.   Psychiatric: He has a normal mood and affect. His behavior is normal. Thought content normal.       Recent Labs      10/22/18   2100  10/23/18   0802  10/24/18   0214   WBC  9.5  6.3  6.2   RBC  4.18*  4.04*  3.96*   HEMOGLOBIN  11.9*  11.5*  11.3*   HEMATOCRIT  37.3*  36.1*  35.4*   MCV  89.2  89.4  89.4   MCH  28.5  28.5  28.5   MCHC  31.9*  31.9*  31.9*   RDW  45.5  45.2  45.5   PLATELETCT  290  258  251   MPV  10.5  10.1  10.5     Recent Labs      10/22/18   2100  10/23/18   0802  10/24/18   0214   SODIUM  137  138  138   POTASSIUM  4.0  4.0  4.0   CHLORIDE  102  104  104   CO2  27  28  26   GLUCOSE  115*  109*  92   BUN  26*  21  21   CREATININE  0.88  0.77  0.89   CALCIUM  9.4  9.1  8.9     Recent Labs       10/22/18   2100   APTT  30.7   INR  1.30*     Recent Labs      10/22/18   2100   BNPBTYPENAT  217*              Assessment/Plan     * Cellulitis   Assessment & Plan    Lower extremity right greater than left.IV antibiotics dc'ed on PO doxycycline and monitor.  Poor compliance with oral medication regimen after recent discharge.   Ultrasound lower extremity negative for acute DVT.  PT following        Paroxysmal A-fib (HCC)- (present on admission)   Assessment & Plan    On rate control.  Anticoagulated with Xarelto        Lower limb amputation, great toe (HCC)- (present on admission)   Assessment & Plan    Stable.         Class 2 obesity due to excess calories without serious comorbidity with body mass index (BMI) of 36.0 to 36.9 in adult   Assessment & Plan    Body mass index is 36.62 kg/m².          Essential hypertension- (present on admission)   Assessment & Plan    Controlled with current medication regimen.  Monitor.         Type 2 diabetes mellitus, without long-term current use of insulin, with peripheral neuropathy (HCC)- (present on admission)   Assessment & Plan    Unclear control.  Monitor with insulin therapy as needed.           Quality-Core Measures   Reviewed items::  Labs reviewed, Radiology images reviewed and Medications reviewed  Zabala catheter::  No Zabala  DVT: Xarelto.  Antibiotics:  Treating active infection/contamination beyond 24 hours perioperative coverage

## 2018-10-24 NOTE — THERAPY
"Physical Therapy Evaluation completed.   Bed Mobility:  Supine to Sit: Supervised  Transfers: Sit to Stand: Supervised  Gait: Level Of Assist: Supervised with Single Point Cane       Plan of Care: Patient with no further skilled PT needs in the acute care setting at this time  Discharge Recommendations: Equipment: No Equipment Needed. Post-acute therapy Currently anticipate no further skilled therapy needs once patient is discharged from the inpatient setting.    Today, he presented essentially at his PLOF. Pt is able to mobilize in/out of bed, stand and ambulate in the hallway w/ a cane, w/o loss of balance or need for physical assist. No acute PT needs.    See \"Rehab Therapy-Acute\" Patient Summary Report for complete documentation.     "

## 2018-10-24 NOTE — CARE PLAN
Problem: Discharge Barriers/Planning  Goal: Patient's continuum of care needs will be met  PT on board to assess pts mobility needs, pt has a cane, assessing pt supportive system for discharge.     Problem: Pain Management  Goal: Pain level will decrease to patient's comfort goal  Outcome: PROGRESSING AS EXPECTED  Assessing pt need for pain medications, offering non pharmacological methods for pain relief.

## 2018-10-24 NOTE — PROGRESS NOTES
Pt is AAO x 4, was denying pain early in the AM but as the day has progressed and he has walked more he has needed pain medication x1. He is steady on his feet with the cane. Have encouraged him to mobilize throughout the day as his swelling is still very prominent on his BLE. Bed locked and in low position, call light in reach, hourly rounding in place.

## 2018-10-24 NOTE — PROGRESS NOTES
Assumed care of pt, discussed plan of care. A&Ox4. RA, tolerates well. RLL, LLL diminished. Complains of LLE pain, rest and repositioning done. Last BM, 10/23. Continent of bowel, bladder. Skin intact; swelling, redness BLE. Assist x1, use of cane. On diabetic diet, takes pills whole without difficulty. IV, CDI, SL. Fall precautions in place; bed lowest position, bed alarm on, treaded socks on, call light within reach. All needs met at this time.

## 2018-10-25 VITALS
BODY MASS INDEX: 37.42 KG/M2 | HEIGHT: 72 IN | TEMPERATURE: 97.8 F | HEART RATE: 85 BPM | DIASTOLIC BLOOD PRESSURE: 93 MMHG | RESPIRATION RATE: 20 BRPM | SYSTOLIC BLOOD PRESSURE: 153 MMHG | OXYGEN SATURATION: 93 % | WEIGHT: 276.24 LBS

## 2018-10-25 PROBLEM — L03.90 CELLULITIS: Status: RESOLVED | Noted: 2018-10-22 | Resolved: 2018-10-25

## 2018-10-25 PROCEDURE — 700102 HCHG RX REV CODE 250 W/ 637 OVERRIDE(OP): Performed by: HOSPITALIST

## 2018-10-25 PROCEDURE — 99239 HOSP IP/OBS DSCHRG MGMT >30: CPT | Performed by: INTERNAL MEDICINE

## 2018-10-25 PROCEDURE — 700102 HCHG RX REV CODE 250 W/ 637 OVERRIDE(OP): Performed by: INTERNAL MEDICINE

## 2018-10-25 PROCEDURE — A9270 NON-COVERED ITEM OR SERVICE: HCPCS | Performed by: INTERNAL MEDICINE

## 2018-10-25 PROCEDURE — A9270 NON-COVERED ITEM OR SERVICE: HCPCS | Performed by: HOSPITALIST

## 2018-10-25 RX ORDER — DOXYCYCLINE 100 MG/1
100 TABLET ORAL EVERY 12 HOURS
Qty: 16 TAB | Refills: 0 | Status: SHIPPED | OUTPATIENT
Start: 2018-10-25 | End: 2018-11-02

## 2018-10-25 RX ADMIN — HYDROCODONE BITARTRATE AND ACETAMINOPHEN 1 TABLET: 5; 325 TABLET ORAL at 05:43

## 2018-10-25 RX ADMIN — PRAVASTATIN SODIUM 20 MG: 20 TABLET ORAL at 05:43

## 2018-10-25 RX ADMIN — METFORMIN HYDROCHLORIDE 500 MG: 500 TABLET ORAL at 08:38

## 2018-10-25 RX ADMIN — HYDROCODONE BITARTRATE AND ACETAMINOPHEN 1 TABLET: 5; 325 TABLET ORAL at 09:30

## 2018-10-25 RX ADMIN — METOPROLOL TARTRATE 25 MG: 25 TABLET, FILM COATED ORAL at 05:43

## 2018-10-25 RX ADMIN — DOXYCYCLINE 100 MG: 100 TABLET ORAL at 05:43

## 2018-10-25 RX ADMIN — TAMSULOSIN HYDROCHLORIDE 0.4 MG: 0.4 CAPSULE ORAL at 08:38

## 2018-10-25 RX ADMIN — AMIODARONE HYDROCHLORIDE 200 MG: 200 TABLET ORAL at 05:43

## 2018-10-25 ASSESSMENT — PAIN SCALES - GENERAL
PAINLEVEL_OUTOF10: 7
PAINLEVEL_OUTOF10: 5
PAINLEVEL_OUTOF10: 5

## 2018-10-25 NOTE — CARE PLAN
Problem: Safety  Goal: Will remain free from injury    Intervention: Provide assistance with mobility  Pt provided standby assistance for ambulation       Problem: Pain Management  Goal: Pain level will decrease to patient's comfort goal    Intervention: Follow pain managment plan developed in collaboration with patient and Interdisciplinary Team  Pain medicated per MAR with PRN pain medication; MD notified so that medication could be dispensed as needed prior to discharge

## 2018-10-25 NOTE — PROGRESS NOTES
Discharging pt per physician order.  Discharged with hospital escort, to shelter on [address] with Taxi voucher and taxi ordered.  Friend also appeared shortly before discharge and assisted pt.  Demonstrated understanding of discharge instructions, follow up appointments, home medications, and new prescription. Ambulating up self with stand by assistance, voiding without difficulty, pain well controlled, tolerating oral medications, oxygen saturation >90%, and tolerating diet.  Educational handouts on cellulitis given and discussed.  Verbalized understanding of discharge instructions and educational handouts.  All questions answered, belongings with patient at the time of discharge.

## 2018-10-25 NOTE — DISCHARGE SUMMARY
Discharge Summary    CHIEF COMPLAINT ON ADMISSION  Chief Complaint   Patient presents with   • T-5000 GLF     earlier today   • Leg Swelling     bilateral lower legs; dx cellulitis   • Knee Pain     left knee from fall       Reason for Admission  T-5000 GLF; Leg Pain     Admission Date  10/22/2018    CODE STATUS  Full Code    HPI & HOSPITAL COURSE  Please see H&P dictated by Dr. Wheeler for further details.  This is a 53 y.o. male here with bilateral lower extremity cellulitis and ground-level fall.  Patient was admitted and started on empiric IV antibiotics.  Patient cellulitis improved during his hospital stay and was transitioned to p.o. doxycycline.  He is discharged home with doxycycline for 8 days to complete his antibiotic course.  Patient was evaluated by physical therapy during his hospital stay and was at baseline did not require any acute therapy needs.  Patient was able to ambulate with his cane without assistance around unit prior to discharge home.  Patient was seen and examined on the day of discharge.       Therefore, he is discharged in good and stable condition to home with close outpatient follow-up.    The patient met 2-midnight criteria for an inpatient stay at the time of discharge.    Discharge Date  10/25/2018      DISCHARGE DIAGNOSES  Principal Problem (Resolved):    Cellulitis POA: Unknown  Active Problems:    Lower limb amputation, great toe (HCC) POA: Yes    Paroxysmal A-fib (HCC) POA: Yes    Type 2 diabetes mellitus, without long-term current use of insulin, with peripheral neuropathy (HCC) POA: Yes    Essential hypertension POA: Yes    Class 2 obesity due to excess calories without serious comorbidity with body mass index (BMI) of 36.0 to 36.9 in adult POA: Unknown      FOLLOW UP  Future Appointments  Date Time Provider Department Center   10/29/2018 3:10 PM Jerad Lomax M.D. [facility]   11/2/2018 2:00 PM Yolette Ross M.D. [facility] None         MEDICATIONS ON DISCHARGE      Medication List      START taking these medications      Instructions   doxycycline monohydrate 100 MG tablet  Commonly known as:  ADOXA   Take 1 Tab by mouth every 12 hours for 8 days.  Dose:  100 mg        CONTINUE taking these medications      Instructions   amiodarone 200 MG Tabs  Commonly known as:  CORDARONE   Take 1 Tab by mouth 2 Times a Day.  Dose:  200 mg     FLOMAX 0.4 MG capsule  Generic drug:  tamsulosin   Take 0.4 mg by mouth ONE-HALF HOUR AFTER BREAKFAST.  Dose:  0.4 mg     metoprolol 25 MG Tabs  Commonly known as:  LOPRESSOR   Take 1 Tab by mouth 2 times a day.  Dose:  25 mg     pravastatin 20 MG Tabs  Commonly known as:  PRAVACHOL   Take 1 Tab by mouth every day.  Dose:  20 mg     rivaroxaban 20 MG Tabs tablet  Commonly known as:  XARELTO   Take 1 Tab by mouth with dinner.  Dose:  20 mg            Allergies  Allergies   Allergen Reactions   • Daptomycin Rash     Body rash  RXN=1/2018     • Pcn [Penicillins] Hives and Rash      Rash & hives  RXN=since a kid   • Unasyn [Ampicillin-Sulbactam Sodium] Hives, Itching and Swelling   • Vancomycin Rash     Red man syndrome       DIET  Orders Placed This Encounter   Procedures   • Diet Order Diabetic     Standing Status:   Standing     Number of Occurrences:   1     Order Specific Question:   Diet:     Answer:   Diabetic [3]       ACTIVITY  As tolerated.      CONSULTATIONS  None    PROCEDURES  None    LABORATORY  Lab Results   Component Value Date    SODIUM 138 10/24/2018    POTASSIUM 4.0 10/24/2018    CHLORIDE 104 10/24/2018    CO2 26 10/24/2018    GLUCOSE 92 10/24/2018    BUN 21 10/24/2018    CREATININE 0.89 10/24/2018        Lab Results   Component Value Date    WBC 6.2 10/24/2018    HEMOGLOBIN 11.3 (L) 10/24/2018    HEMATOCRIT 35.4 (L) 10/24/2018    PLATELETCT 251 10/24/2018      This dictation was created using voice recognition software. The accuracy of the dictation is limited to the abilities of the software. I expect there may be some errors of  grammar and possibly content.    Total time of the discharge process exceeds 38 minutes.

## 2018-10-25 NOTE — PROGRESS NOTES
Assumed care of pt, discussed plan of care. No significant changes from prev shift. A&Ox4. RA. Complains of LLE pain, rest and repositioning done. Last BM, 10/24. Continent of bowel, bladder. Skin intact; swelling, redness BLE. SBA, use of cane. On diabetic diet, takes pills whole without difficulty. +N, medicated per MAR. IV, CDI, SL. Fall precautions in place; bed lowest position, bed alarm on, treaded socks on, call light within reach. All needs met at this time.

## 2018-10-25 NOTE — DISCHARGE INSTRUCTIONS
Discharge Instructions    Discharged to other by taxi with self. Discharged via wheelchair, hospital escort: Yes.  Special equipment needed: Not Applicable    Be sure to schedule a follow-up appointment with your primary care doctor or any specialists as instructed.     Discharge Plan:   Diet Plan: Discussed (diabetic / heart healthy)  Activity Level: Discussed (resume as tolerated)  Confirmed Follow up Appointment: Appointment Scheduled  Confirmed Symptoms Management: Discussed  Medication Reconciliation Updated: Yes  Influenza Vaccine Indication: Not indicated: Previously immunized this influenza season and > 8 years of age    I understand that a diet low in cholesterol, fat, and sodium is recommended for good health. Unless I have been given specific instructions below for another diet, I accept this instruction as my diet prescription.   Other diet: diabetic / heart healthy    Special Instructions: None    · Is patient discharged on Warfarin / Coumadin?   No     Depression / Suicide Risk    As you are discharged from this [facility] facility, it is important to learn how to keep safe from harming yourself.    Recognize the warning signs:  · Abrupt changes in personality, positive or negative- including increase in energy   · Giving away possessions  · Change in eating patterns- significant weight changes-  positive or negative  · Change in sleeping patterns- unable to sleep or sleeping all the time   · Unwillingness or inability to communicate  · Depression  · Unusual sadness, discouragement and loneliness  · Talk of wanting to die  · Neglect of personal appearance   · Rebelliousness- reckless behavior  · Withdrawal from people/activities they love  · Confusion- inability to concentrate     If you or a loved one observes any of these behaviors or has concerns about self-harm, here's what you can do:  · Talk about it- your feelings and reasons for harming yourself  · Remove any means that you might use to hurt  yourself (examples: pills, rope, extension cords, firearm)  · Get professional help from the community (Mental Health, Substance Abuse, psychological counseling)  · Do not be alone:Call your Safe Contact- someone whom you trust who will be there for you.  · Call your local CRISIS HOTLINE 757-3241 or 539-682-1965  · Call your local Children's Mobile Crisis Response Team [place] (646) 740-2376 or www.[facility]  · Call the toll free National Suicide Prevention Hotlines   · National Suicide Prevention Lifeline 198-740-IESX (3218)  · [employer] Line Network 800-SUICIDE (866-5457)      Cellulitis, Adult  Introduction  Cellulitis is a skin infection. The infected area is usually red and sore. This condition occurs most often in the arms and lower legs. It is very important to get treated for this condition.  Follow these instructions at home:  · Take over-the-counter and prescription medicines only as told by your doctor.  · If you were prescribed an antibiotic medicine, take it as told by your doctor. Do not stop taking the antibiotic even if you start to feel better.  · Drink enough fluid to keep your pee (urine) clear or pale yellow.  · Do not touch or rub the infected area.  · Raise (elevate) the infected area above the level of your heart while you are sitting or lying down.  · Place warm or cold wet cloths (warm or cold compresses) on the infected area. Do this as told by your doctor.  · Keep all follow-up visits as told by your doctor. This is important. These visits let your doctor make sure your infection is not getting worse.  Contact a doctor if:  · You have a fever.  · Your symptoms do not get better after 1-2 days of treatment.  · Your bone or joint under the infected area starts to hurt after the skin has healed.  · Your infection comes back. This can happen in the same area or another area.  · You have a swollen bump in the infected area.  · You have new symptoms.  · You feel ill and also  have muscle aches and pains.  Get help right away if:  · Your symptoms get worse.  · You feel very sleepy.  · You throw up (vomit) or have watery poop (diarrhea) for a long time.  · There are red streaks coming from the infected area.  · Your red area gets larger.  · Your red area turns darker.  This information is not intended to replace advice given to you by your health care provider. Make sure you discuss any questions you have with your health care provider.  Document Released: 06/05/2009 Document Revised: 05/25/2017 Document Reviewed: 10/26/2016  © 2017 [employer]vier

## 2018-10-25 NOTE — PROGRESS NOTES
Late Entry: 0730 Assumed care of patient at change of shift.  During change of shift report, patient (pt) resting in bed, on room air, with probable discharge this morning.  During assessment, pt A&Ox4.  No other needs at this time.

## 2018-10-25 NOTE — DISCHARGE PLANNING
Medical Social Work  Patient provided [facility] voucher, [id number], 10 x10

## 2018-10-25 NOTE — CARE PLAN
Problem: Communication  Goal: The ability to communicate needs accurately and effectively will improve    Intervention: Educate patient and significant other/support system about the plan of care, procedures, treatments, medications and allow for questions  Educated pt on plan of care, scheduled medications. Pt acknowledges understanding. Pt able to make needs known.      Problem: Pain Management  Goal: Pain level will decrease to patient's comfort goal    Intervention: Follow pain managment plan developed in collaboration with patient and Interdisciplinary Team  Pt complains of pain to LLE, medicated per MAR. Pt has PRN [place] available for pain control.

## 2018-10-26 ENCOUNTER — APPOINTMENT (OUTPATIENT)
Dept: RADIOLOGY | Facility: MEDICAL CENTER | Age: 53
End: 2018-10-26
Attending: EMERGENCY MEDICINE
Payer: MEDICAID

## 2018-10-26 ENCOUNTER — HOSPITAL ENCOUNTER (EMERGENCY)
Facility: MEDICAL CENTER | Age: 53
End: 2018-10-26
Attending: EMERGENCY MEDICINE
Payer: MEDICAID

## 2018-10-26 VITALS
HEIGHT: 72 IN | WEIGHT: 270 LBS | SYSTOLIC BLOOD PRESSURE: 132 MMHG | OXYGEN SATURATION: 99 % | TEMPERATURE: 97.9 F | RESPIRATION RATE: 22 BRPM | DIASTOLIC BLOOD PRESSURE: 77 MMHG | BODY MASS INDEX: 36.57 KG/M2 | HEART RATE: 110 BPM

## 2018-10-26 DIAGNOSIS — R07.9 CHEST PAIN, UNSPECIFIED TYPE: ICD-10-CM

## 2018-10-26 DIAGNOSIS — R60.9 PERIPHERAL EDEMA: ICD-10-CM

## 2018-10-26 LAB
ALBUMIN SERPL BCP-MCNC: 3.7 G/DL (ref 3.2–4.9)
ALBUMIN/GLOB SERPL: 1 G/DL
ALP SERPL-CCNC: 105 U/L (ref 30–99)
ALT SERPL-CCNC: 22 U/L (ref 2–50)
ANION GAP SERPL CALC-SCNC: 9 MMOL/L (ref 0–11.9)
APTT PPP: 36 SEC (ref 24.7–36)
AST SERPL-CCNC: 22 U/L (ref 12–45)
BASOPHILS # BLD AUTO: 0.6 % (ref 0–1.8)
BASOPHILS # BLD: 0.05 K/UL (ref 0–0.12)
BILIRUB SERPL-MCNC: 0.6 MG/DL (ref 0.1–1.5)
BNP SERPL-MCNC: 131 PG/ML (ref 0–100)
BUN SERPL-MCNC: 20 MG/DL (ref 8–22)
CALCIUM SERPL-MCNC: 9.6 MG/DL (ref 8.5–10.5)
CHLORIDE SERPL-SCNC: 102 MMOL/L (ref 96–112)
CO2 SERPL-SCNC: 25 MMOL/L (ref 20–33)
CREAT SERPL-MCNC: 0.99 MG/DL (ref 0.5–1.4)
EKG IMPRESSION: NORMAL
EOSINOPHIL # BLD AUTO: 0.22 K/UL (ref 0–0.51)
EOSINOPHIL NFR BLD: 2.7 % (ref 0–6.9)
ERYTHROCYTE [DISTWIDTH] IN BLOOD BY AUTOMATED COUNT: 46.1 FL (ref 35.9–50)
GLOBULIN SER CALC-MCNC: 3.8 G/DL (ref 1.9–3.5)
GLUCOSE SERPL-MCNC: 112 MG/DL (ref 65–99)
HCT VFR BLD AUTO: 40.2 % (ref 42–52)
HGB BLD-MCNC: 12.7 G/DL (ref 14–18)
IMM GRANULOCYTES # BLD AUTO: 0.04 K/UL (ref 0–0.11)
IMM GRANULOCYTES NFR BLD AUTO: 0.5 % (ref 0–0.9)
INR PPP: 1.31 (ref 0.87–1.13)
LYMPHOCYTES # BLD AUTO: 1.94 K/UL (ref 1–4.8)
LYMPHOCYTES NFR BLD: 23.5 % (ref 22–41)
MCH RBC QN AUTO: 28.6 PG (ref 27–33)
MCHC RBC AUTO-ENTMCNC: 31.6 G/DL (ref 33.7–35.3)
MCV RBC AUTO: 90.5 FL (ref 81.4–97.8)
MONOCYTES # BLD AUTO: 0.49 K/UL (ref 0–0.85)
MONOCYTES NFR BLD AUTO: 5.9 % (ref 0–13.4)
NEUTROPHILS # BLD AUTO: 5.5 K/UL (ref 1.82–7.42)
NEUTROPHILS NFR BLD: 66.8 % (ref 44–72)
NRBC # BLD AUTO: 0 K/UL
NRBC BLD-RTO: 0 /100 WBC
PLATELET # BLD AUTO: 259 K/UL (ref 164–446)
PMV BLD AUTO: 10.3 FL (ref 9–12.9)
POTASSIUM SERPL-SCNC: 4.7 MMOL/L (ref 3.6–5.5)
PROT SERPL-MCNC: 7.5 G/DL (ref 6–8.2)
PROTHROMBIN TIME: 16.4 SEC (ref 12–14.6)
RBC # BLD AUTO: 4.44 M/UL (ref 4.7–6.1)
SODIUM SERPL-SCNC: 136 MMOL/L (ref 135–145)
TROPONIN I SERPL-MCNC: <0.01 NG/ML (ref 0–0.04)
TROPONIN I SERPL-MCNC: <0.01 NG/ML (ref 0–0.04)
WBC # BLD AUTO: 8.2 K/UL (ref 4.8–10.8)

## 2018-10-26 PROCEDURE — 99285 EMERGENCY DEPT VISIT HI MDM: CPT

## 2018-10-26 PROCEDURE — 80053 COMPREHEN METABOLIC PANEL: CPT

## 2018-10-26 PROCEDURE — 83880 ASSAY OF NATRIURETIC PEPTIDE: CPT

## 2018-10-26 PROCEDURE — 85025 COMPLETE CBC W/AUTO DIFF WBC: CPT

## 2018-10-26 PROCEDURE — 84484 ASSAY OF TROPONIN QUANT: CPT | Mod: 91

## 2018-10-26 PROCEDURE — 93005 ELECTROCARDIOGRAM TRACING: CPT | Performed by: EMERGENCY MEDICINE

## 2018-10-26 PROCEDURE — 94760 N-INVAS EAR/PLS OXIMETRY 1: CPT

## 2018-10-26 PROCEDURE — 85610 PROTHROMBIN TIME: CPT

## 2018-10-26 PROCEDURE — 36415 COLL VENOUS BLD VENIPUNCTURE: CPT

## 2018-10-26 PROCEDURE — 85730 THROMBOPLASTIN TIME PARTIAL: CPT

## 2018-10-26 PROCEDURE — 71045 X-RAY EXAM CHEST 1 VIEW: CPT

## 2018-10-26 RX ORDER — FUROSEMIDE 40 MG/1
40 TABLET ORAL DAILY
Qty: 7 TAB | Refills: 0 | Status: SHIPPED | OUTPATIENT
Start: 2018-10-26 | End: 2018-11-20 | Stop reason: SDUPTHER

## 2018-10-26 ASSESSMENT — ENCOUNTER SYMPTOMS
DIARRHEA: 1
HEADACHES: 0
VOMITING: 0
SHORTNESS OF BREATH: 1
FEVER: 0
NAUSEA: 1
SPUTUM PRODUCTION: 1
CHILLS: 1
PALPITATIONS: 0
ABDOMINAL PAIN: 0
COUGH: 1

## 2018-10-26 ASSESSMENT — PAIN SCALES - GENERAL: PAINLEVEL_OUTOF10: 7

## 2018-10-26 NOTE — ED PROVIDER NOTES
ED Provider Note    ED Provider Note    Primary care provider: Jerad Lomax M.D.  Means of arrival: EMS  History obtained from: Patient  History limited by: none    CHIEF COMPLAINT  Chief Complaint   Patient presents with   • Chest Pain       HPI  Sebastián Castro is a 53 y.o. male who presents to the Emergency Department with a chief complaint of chest pain.  Patient states he was discharged home just yesterday, for cellulitis.  He is on antibiotics, clindamycin.  He went to bed last night, and had a minor amount of chest pain.  It worsened this morning at about 4 AM, prompting his ED visit.  He has had similar symptoms in the past and told it was secondary to his atrial fibrillation which he has chronically and is on Xarelto.  However, patient also complains of chills.  No notable fever but he is unable to take his temperature at home.  He complains of cough cough productive of green sputum.  He also has some mild shortness of breath.  Patient has bilateral lower extremity swelling which he states is at his baseline.  He states he goes up and down.  He has been told they suppose that he has congestive heart failure although he has not had a definitive diagnosis.  He complains of nausea and diarrhea but no vomiting and no abdominal pain.    REVIEW OF SYSTEMS  Review of Systems   Constitutional: Positive for chills. Negative for fever.   Respiratory: Positive for cough, sputum production and shortness of breath.    Cardiovascular: Positive for chest pain and leg swelling. Negative for palpitations.   Gastrointestinal: Positive for diarrhea and nausea. Negative for abdominal pain and vomiting.   Genitourinary: Negative for dysuria.   Neurological: Negative for headaches.   All other systems reviewed and are negative.      PAST MEDICAL HISTORY   has a past medical history of A-fib (HCC); Chest pain; Congestive heart failure (HCC); Diabetes (HCC); Diabetic neuropathy (HCC); Hypercholesterolemia; Hypertension; Morbid  obesity (HCC); Noncompliance; and Normal cardiac stress test.    SURGICAL HISTORY   has a past surgical history that includes toe amputation (Right, 11/10/2017); toe amputation (Right, 1/17/2018); irrigation & debridement ortho (Right, 2/19/2018); and cardiac cath (07/21/2018).    SOCIAL HISTORY  Social History   Substance Use Topics   • Smoking status: Never Smoker   • Smokeless tobacco: Never Used   • Alcohol use No      History   Drug Use No       FAMILY HISTORY  Family History   Problem Relation Age of Onset   • No Known Problems Mother    • No Known Problems Father        CURRENT MEDICATIONS  Home Medications    **Home medications have not yet been reviewed for this encounter**         ALLERGIES  Allergies   Allergen Reactions   • Daptomycin Rash     Body rash  RXN=1/2018     • Pcn [Penicillins] Hives and Rash      Rash & hives  RXN=since a kid   • Unasyn [Ampicillin-Sulbactam Sodium] Hives, Itching and Swelling   • Vancomycin Rash     Red man syndrome       PHYSICAL EXAM  VITAL SIGNS: /77   Pulse (!) 110   Temp 36.6 °C (97.9 °F)   Resp (!) 22   Ht 1.829 m (6')   Wt 122.5 kg (270 lb)   SpO2 99%   BMI 36.62 kg/m²   Vitals reviewed.  Constitutional: Patient is oriented to person, place, and time. Appears well-developed and well-nourished. No distress.    Head: Normocephalic and atraumatic.   Ears: Normal external ears bilaterally.   Mouth/Throat: Oropharynx is clear and moist, no exudates.   Eyes: Conjunctivae are normal. Pupils are equal, round, and reactive to light.   Neck: Normal range of motion. Neck supple.  Cardiovascular: Normal rate, regular rhythm and normal heart sounds. Normal peripheral pulses.  Pulmonary/Chest: Effort normal and breath sounds normal. No respiratory distress, no wheezes, rhonchi, or rales. No chest wall tenderness.  Abdominal: Soft. Bowel sounds are normal. There is no tenderness. No rebound or guarding, or peritoneal signs. No CVA tenderness.  Musculoskeletal: Bilateral  lower extremity edema.  It appears symmetric.  There are are chronic venous stasis dermatitis changes of the bilateral lower extremities as well.   Neurological: No focal deficits.   Skin: Skin is warm and dry. No erythema. No pallor.   Psychiatric: Patient has a normal mood and affect.     LABS  Results for orders placed or performed during the hospital encounter of 10/26/18   CBC w/ Differential   Result Value Ref Range    WBC 8.2 4.8 - 10.8 K/uL    RBC 4.44 (L) 4.70 - 6.10 M/uL    Hemoglobin 12.7 (L) 14.0 - 18.0 g/dL    Hematocrit 40.2 (L) 42.0 - 52.0 %    MCV 90.5 81.4 - 97.8 fL    MCH 28.6 27.0 - 33.0 pg    MCHC 31.6 (L) 33.7 - 35.3 g/dL    RDW 46.1 35.9 - 50.0 fL    Platelet Count 259 164 - 446 K/uL    MPV 10.3 9.0 - 12.9 fL    Neutrophils-Polys 66.80 44.00 - 72.00 %    Lymphocytes 23.50 22.00 - 41.00 %    Monocytes 5.90 0.00 - 13.40 %    Eosinophils 2.70 0.00 - 6.90 %    Basophils 0.60 0.00 - 1.80 %    Immature Granulocytes 0.50 0.00 - 0.90 %    Nucleated RBC 0.00 /100 WBC    Neutrophils (Absolute) 5.50 1.82 - 7.42 K/uL    Lymphs (Absolute) 1.94 1.00 - 4.80 K/uL    Monos (Absolute) 0.49 0.00 - 0.85 K/uL    Eos (Absolute) 0.22 0.00 - 0.51 K/uL    Baso (Absolute) 0.05 0.00 - 0.12 K/uL    Immature Granulocytes (abs) 0.04 0.00 - 0.11 K/uL    NRBC (Absolute) 0.00 K/uL   Troponin STAT   Result Value Ref Range    Troponin I <0.01 0.00 - 0.04 ng/mL   Complete Metabolic Panel (CMP)   Result Value Ref Range    Sodium 136 135 - 145 mmol/L    Potassium 4.7 3.6 - 5.5 mmol/L    Chloride 102 96 - 112 mmol/L    Co2 25 20 - 33 mmol/L    Anion Gap 9.0 0.0 - 11.9    Glucose 112 (H) 65 - 99 mg/dL    Bun 20 8 - 22 mg/dL    Creatinine 0.99 0.50 - 1.40 mg/dL    Calcium 9.6 8.5 - 10.5 mg/dL    AST(SGOT) 22 12 - 45 U/L    ALT(SGPT) 22 2 - 50 U/L    Alkaline Phosphatase 105 (H) 30 - 99 U/L    Total Bilirubin 0.6 0.1 - 1.5 mg/dL    Albumin 3.7 3.2 - 4.9 g/dL    Total Protein 7.5 6.0 - 8.2 g/dL    Globulin 3.8 (H) 1.9 - 3.5 g/dL    A-G  Ratio 1.0 g/dL   PT/INR   Result Value Ref Range    PT 16.4 (H) 12.0 - 14.6 sec    INR 1.31 (H) 0.87 - 1.13   APTT   Result Value Ref Range    APTT 36.0 24.7 - 36.0 sec   BNP   Result Value Ref Range    B Natriuretic Peptide 131 (H) 0 - 100 pg/mL   ESTIMATED GFR   Result Value Ref Range    GFR If African American >60 >60 mL/min/1.73 m 2    GFR If Non African American >60 >60 mL/min/1.73 m 2   TROPONIN   Result Value Ref Range    Troponin I <0.01 0.00 - 0.04 ng/mL   EKG   Result Value Ref Range    Report       [facility] Emergency Dept.    Test Date:  2018-10-26  Pt Name:    EMELY FLORES                Department: ER  MRN:        [id number]                      Room:        02  Gender:     Male                         Technician: [id number]  :        [id number]                   Requested By:NEAL DEL ROSARIO  Order #:    [id number]                    Reading MD:    Measurements  Intervals                                Axis  Rate:       97                           P:  NH:                                      QRS:        -18  QRSD:       102                          T:          48  QT:         396  QTc:        503    Interpretive Statements  A-FLUTTER W/ PREDOM 3:1 AV BLOCK, A-RATE 312  BORDERLINE LEFT AXIS DEVIATION  LOW VOLTAGE IN FRONTAL LEADS  BORDERLINE R WAVE PROGRESSION, ANTERIOR LEADS  PROLONGED QT INTERVAL  BASELINE WANDER IN LEAD(S) V3,V4  Compared to ECG 10/22/2018 20:32:59  Atrial fibrillation no longer present  Ventricular premature complex(es) no longer present         All labs reviewed by me.    EKG Interpretation  Interpreted by me    Rhythm: Atrial Fibrillation  Rate: 97  Axis: normal  Ectopy: none  Conduction: normal  ST Segments: no acute change  T Waves: no acute change  Q Waves: none    Clinical Impression: Comparison made to prior EKG from 2018.  At that time, patient in atrial fibrillation with a rate of 87.  Today AF with controlled rate.  No acute ST segment  elevation.    RADIOLOGY  DX-CHEST-PORTABLE (1 VIEW)   Final Result      Cardiomegaly and minimal interstitial edema. No focal consolidation or pleural effusions.        The radiologist's interpretation of all radiological studies have been reviewed by me.    COURSE & MEDICAL DECISION MAKING  Pertinent Labs & Imaging studies reviewed. (See chart for details)    Obtained and reviewed past medical records.  Patient was admitted to the hospital on October 22 just 4 days ago and discharged yesterday.  He was here for bilateral lower extremity cellulitis and history of a ground-level fall.  Troponin noted to be 0 x 3 this month.    7:44 AM - Patient seen and examined at bedside.  This is a pleasant 53-year-old male who presents with right-sided chest pain.  This is associated with chills, cough some shortness of breath.  Recently hospitalized for about 4 days.  No fever noted.  Certainly concern for possible pneumonia.  Otherwise, he had similar symptoms in the past related to atrial fibrillation.  No acute findings noted on EKG.  Recent troponin negative.  Will repeat today and also evaluate for pneumonia or CHF.    11:21 AM Patient see at beside. CP is resolved. Awaiting 2nd trop. He is worried about his LE edema. I do think he could benefit from a diuretic.     11:22 AM I have spoken with both the nurse on 2 occassions as well as the lab re: delay in trop. In process now.     11:41 AM additional previous records reviewed including carotid Dopplers which showed less than 50% occlusion.  In July patient had a negative PE study.  In addition, in January of this year, the patient had a nuclear medicine stress test that showed no evidence of ischemia with an EF of 64%.  An echocardiogram at the time which showed an EF of 60%.  Normal left ventricular size and systolic function noted.    11:59 AM patient's reevaluated the bedside.  Again chest pain-free.  Repeat troponin is also 0.  I doubt now, that this is ACS.  Patient be  discharged home with a short course of diuretics.  He is advised to follow-up with his primary care doctor.  He is overall well-appearing, pain-free and will be discharged home in stable condition.      FINAL IMPRESSION  1. Chest pain, unspecified type    2. Peripheral edema

## 2018-10-26 NOTE — ED TRIAGE NOTES
Patient arrived via EMS r/t right sided chest pain that started last night. States pain has been on and off throughout the night, but now constant. One of nitro sublingual given by ems en route, which patient states did help his pain. 4 baby aspirin given by ems en route.

## 2018-10-27 ENCOUNTER — PATIENT OUTREACH (OUTPATIENT)
Dept: HEALTH INFORMATION MANAGEMENT | Facility: OTHER | Age: 53
End: 2018-10-27

## 2018-10-28 PROCEDURE — 99284 EMERGENCY DEPT VISIT MOD MDM: CPT

## 2018-10-29 ENCOUNTER — HOSPITAL ENCOUNTER (EMERGENCY)
Facility: MEDICAL CENTER | Age: 53
End: 2018-10-29
Attending: EMERGENCY MEDICINE
Payer: MEDICAID

## 2018-10-29 ENCOUNTER — HOSPITAL ENCOUNTER (EMERGENCY)
Dept: HOSPITAL 8 - ED | Age: 53
Discharge: HOME | End: 2018-10-29
Payer: MEDICAID

## 2018-10-29 VITALS
SYSTOLIC BLOOD PRESSURE: 139 MMHG | RESPIRATION RATE: 17 BRPM | HEIGHT: 72 IN | OXYGEN SATURATION: 100 % | WEIGHT: 279.32 LBS | DIASTOLIC BLOOD PRESSURE: 77 MMHG | TEMPERATURE: 97.1 F | HEART RATE: 96 BPM | BODY MASS INDEX: 37.83 KG/M2

## 2018-10-29 VITALS — HEIGHT: 72 IN | BODY MASS INDEX: 38.82 KG/M2 | WEIGHT: 286.6 LBS

## 2018-10-29 VITALS — SYSTOLIC BLOOD PRESSURE: 132 MMHG | DIASTOLIC BLOOD PRESSURE: 64 MMHG

## 2018-10-29 DIAGNOSIS — I87.8 VENOUS STASIS OF BOTH LOWER EXTREMITIES: ICD-10-CM

## 2018-10-29 DIAGNOSIS — R60.9 PERIPHERAL EDEMA: ICD-10-CM

## 2018-10-29 DIAGNOSIS — E11.9: ICD-10-CM

## 2018-10-29 DIAGNOSIS — I48.91: ICD-10-CM

## 2018-10-29 DIAGNOSIS — I50.9: ICD-10-CM

## 2018-10-29 DIAGNOSIS — Z88.0: ICD-10-CM

## 2018-10-29 DIAGNOSIS — R60.0: Primary | ICD-10-CM

## 2018-10-29 DIAGNOSIS — M79.89 LEG SWELLING: ICD-10-CM

## 2018-10-29 DIAGNOSIS — I11.0: ICD-10-CM

## 2018-10-29 LAB
ALBUMIN SERPL BCP-MCNC: 3.8 G/DL (ref 3.2–4.9)
ALBUMIN SERPL-MCNC: 3.3 G/DL (ref 3.4–5)
ALBUMIN/GLOB SERPL: 1.1 G/DL
ALP SERPL-CCNC: 106 U/L (ref 45–117)
ALP SERPL-CCNC: 93 U/L (ref 30–99)
ALT SERPL-CCNC: 19 U/L (ref 2–50)
ALT SERPL-CCNC: 29 U/L (ref 12–78)
ANION GAP SERPL CALC-SCNC: 10 MMOL/L (ref 5–15)
ANION GAP SERPL CALC-SCNC: 8 MMOL/L (ref 0–11.9)
AST SERPL-CCNC: 20 U/L (ref 12–45)
BASOPHILS # BLD AUTO: 0.03 X10^3/UL (ref 0–0.1)
BASOPHILS # BLD AUTO: 0.7 % (ref 0–1.8)
BASOPHILS # BLD: 0.05 K/UL (ref 0–0.12)
BASOPHILS NFR BLD AUTO: 0 % (ref 0–1)
BILIRUB SERPL-MCNC: 0.5 MG/DL (ref 0.1–1.5)
BILIRUB SERPL-MCNC: 0.6 MG/DL (ref 0.2–1)
BNP SERPL-MCNC: 121 PG/ML (ref 0–100)
BUN SERPL-MCNC: 28 MG/DL (ref 8–22)
CALCIUM SERPL-MCNC: 8.6 MG/DL (ref 8.5–10.1)
CALCIUM SERPL-MCNC: 9.2 MG/DL (ref 8.5–10.5)
CHLORIDE SERPL-SCNC: 102 MMOL/L (ref 96–112)
CHLORIDE SERPL-SCNC: 104 MMOL/L (ref 98–107)
CO2 SERPL-SCNC: 28 MMOL/L (ref 20–33)
CREAT SERPL-MCNC: 0.91 MG/DL (ref 0.5–1.4)
CREAT SERPL-MCNC: 0.95 MG/DL (ref 0.7–1.3)
EOSINOPHIL # BLD AUTO: 0.19 K/UL (ref 0–0.51)
EOSINOPHIL # BLD AUTO: 0.2 X10^3/UL (ref 0–0.4)
EOSINOPHIL NFR BLD AUTO: 3 % (ref 1–7)
EOSINOPHIL NFR BLD: 2.5 % (ref 0–6.9)
ERYTHROCYTE [DISTWIDTH] IN BLOOD BY AUTOMATED COUNT: 15.2 % (ref 9.4–14.8)
ERYTHROCYTE [DISTWIDTH] IN BLOOD BY AUTOMATED COUNT: 47.4 FL (ref 35.9–50)
GLOBULIN SER CALC-MCNC: 3.6 G/DL (ref 1.9–3.5)
GLUCOSE SERPL-MCNC: 103 MG/DL (ref 65–99)
HCT VFR BLD AUTO: 37.7 % (ref 42–52)
HGB BLD-MCNC: 11.9 G/DL (ref 14–18)
IMM GRANULOCYTES # BLD AUTO: 0.02 K/UL (ref 0–0.11)
IMM GRANULOCYTES NFR BLD AUTO: 0.3 % (ref 0–0.9)
LYMPHOCYTES # BLD AUTO: 2.35 X10^3/UL (ref 1–3.4)
LYMPHOCYTES # BLD AUTO: 2.77 K/UL (ref 1–4.8)
LYMPHOCYTES NFR BLD AUTO: 33 % (ref 22–44)
LYMPHOCYTES NFR BLD: 37 % (ref 22–41)
MCH RBC QN AUTO: 28.4 PG (ref 27.5–34.5)
MCH RBC QN AUTO: 28.7 PG (ref 27–33)
MCHC RBC AUTO-ENTMCNC: 31.6 G/DL (ref 33.7–35.3)
MCHC RBC AUTO-ENTMCNC: 32.4 G/DL (ref 33.2–36.2)
MCV RBC AUTO: 87.5 FL (ref 81–97)
MCV RBC AUTO: 90.8 FL (ref 81.4–97.8)
MD: NO
MONOCYTES # BLD AUTO: 0.49 X10^3/UL (ref 0.2–0.8)
MONOCYTES # BLD AUTO: 0.62 K/UL (ref 0–0.85)
MONOCYTES NFR BLD AUTO: 7 % (ref 2–9)
MONOCYTES NFR BLD AUTO: 8.3 % (ref 0–13.4)
NEUTROPHILS # BLD AUTO: 3.84 K/UL (ref 1.82–7.42)
NEUTROPHILS # BLD AUTO: 4.12 X10^3/UL (ref 1.8–6.8)
NEUTROPHILS NFR BLD AUTO: 57 % (ref 42–75)
NEUTROPHILS NFR BLD: 51.2 % (ref 44–72)
NRBC # BLD AUTO: 0 K/UL
NRBC BLD-RTO: 0 /100 WBC
PLATELET # BLD AUTO: 275 K/UL (ref 164–446)
PLATELET # BLD AUTO: 299 X10^3/UL (ref 130–400)
PMV BLD AUTO: 10.3 FL (ref 9–12.9)
PMV BLD AUTO: 9.1 FL (ref 7.4–10.4)
POTASSIUM SERPL-SCNC: 3.9 MMOL/L (ref 3.6–5.5)
PROT SERPL-MCNC: 7.4 G/DL (ref 6–8.2)
PROT SERPL-MCNC: 7.6 G/DL (ref 6.4–8.2)
RBC # BLD AUTO: 4.15 M/UL (ref 4.7–6.1)
RBC # BLD AUTO: 4.29 X10^6/UL (ref 4.38–5.82)
SODIUM SERPL-SCNC: 138 MMOL/L (ref 135–145)
TROPONIN I SERPL-MCNC: <0.01 NG/ML (ref 0–0.04)
WBC # BLD AUTO: 7.5 K/UL (ref 4.8–10.8)

## 2018-10-29 PROCEDURE — 36415 COLL VENOUS BLD VENIPUNCTURE: CPT

## 2018-10-29 PROCEDURE — 84484 ASSAY OF TROPONIN QUANT: CPT

## 2018-10-29 PROCEDURE — 83880 ASSAY OF NATRIURETIC PEPTIDE: CPT

## 2018-10-29 PROCEDURE — 93005 ELECTROCARDIOGRAM TRACING: CPT | Performed by: EMERGENCY MEDICINE

## 2018-10-29 PROCEDURE — 80053 COMPREHEN METABOLIC PANEL: CPT

## 2018-10-29 PROCEDURE — 96374 THER/PROPH/DIAG INJ IV PUSH: CPT

## 2018-10-29 PROCEDURE — 85025 COMPLETE CBC W/AUTO DIFF WBC: CPT

## 2018-10-29 PROCEDURE — 99284 EMERGENCY DEPT VISIT MOD MDM: CPT

## 2018-10-29 ASSESSMENT — PAIN SCALES - GENERAL: PAINLEVEL_OUTOF10: 10

## 2018-10-29 ASSESSMENT — PAIN DESCRIPTION - DESCRIPTORS: DESCRIPTORS: BURNING

## 2018-10-29 NOTE — ED NOTES
Pt verbalizes understanding of discharge and follow-up instructions.  PIV removed.  Given Rx X0.  VSS.  All questions answered.  Taken to waiting room in wheelchair by ER tech to call MTM for ride back to homeless shelter

## 2018-10-29 NOTE — ED PROVIDER NOTES
ED Provider Note    CHIEF COMPLAINT  Chief Complaint   Patient presents with   • Leg Pain   • Leg Swelling       [facility]  Sebastián Castro is a 53 y.o. male who presents with bilateral lower extremity pain and swelling.  He was here 2 days ago for similar symptoms and admitted to the hospital 6 days ago and discharged after admission.  He is on Xarelto for chronic A. fib.  He has been taking clindamycin without improvement in his swelling or pain.  He states that he has chronic chest pains lasting several weeks now.  He does note a prior diagnosis of congestive heart failure.  He states that he is compliant with his medications and diet.  The patient is a resident at a homeless shelter.    Last echocardiogram was on 7/9/2018 showing an LVEF of 40%.  Moderately reduced LV systolic function.  Mild concentric LV hypertrophy.    Vascular ultrasound of theBilateral lower extremities were performed on 10/22/2018.  No acute occlusive DVT was identified on the left.  Chronic nonocclusive superficial venous thrombosis in the greater saphenous vein.    REVIEW OF SYSTEMS  See [facility] for further details. All other systems are negative.     PAST MEDICAL HISTORY   has a past medical history of A-fib (HCC); Chest pain; Congestive heart failure (HCC); Diabetes (HCC); Diabetic neuropathy (HCC); Hypercholesterolemia; Hypertension; Morbid obesity (HCC); Noncompliance; and Normal cardiac stress test.    SOCIAL HISTORY  Social History     Social History Main Topics   • Smoking status: Never Smoker   • Smokeless tobacco: Never Used   • Alcohol use No   • Drug use: No   • Sexual activity: Not on file       SURGICAL HISTORY   has a past surgical history that includes toe amputation (Right, 11/10/2017); toe amputation (Right, 1/17/2018); irrigation & debridement ortho (Right, 2/19/2018); and cardiac cath (07/21/2018).    CURRENT MEDICATIONS  Home Medications     Reviewed by Gibran Ledbetter R.N. (Registered Nurse) on 10/29/18 at 0019  Med List  Status: Partial   Medication Last Dose Status   amiodarone (CORDARONE) 200 MG Tab 10/22/2018 Active   doxycycline monohydrate (ADOXA) 100 MG tablet  Active   furosemide (LASIX) 40 MG Tab  Active   metoprolol (LOPRESSOR) 25 MG Tab 10/22/2018 Active   pravastatin (PRAVACHOL) 20 MG Tab 10/22/2018 Active   rivaroxaban (XARELTO) 20 MG Tab tablet 10/21/2018 Active   tamsulosin (FLOMAX) 0.4 MG capsule 10/22/2018 Active                ALLERGIES  Allergies   Allergen Reactions   • Daptomycin Rash     Body rash  RXN=1/2018     • Pcn [Penicillins] Hives and Rash      Rash & hives  RXN=since a kid   • Unasyn [Ampicillin-Sulbactam Sodium] Hives, Itching and Swelling   • Vancomycin Rash     Red man syndrome       PHYSICAL EXAM  VITAL SIGNS: /77   Pulse 89   Temp 36.2 °C (97.1 °F)   Resp 16   Ht 1.829 m (6')   Wt (!) 126.7 kg (279 lb 5.2 oz)   SpO2 98%   BMI 37.88 kg/m²   Pulse ox interpretation: I interpret this pulse ox as normal.  Constitutional: Alert in no apparent distress.  HENT: No signs of trauma, Bilateral external ears normal, Nose normal.   Cardiovascular: Regular rate and rhythm.   Thorax & Lungs: Normal breath sounds, No respiratory distress, No wheezing, No chest tenderness.   Abdomen: Bowel sounds normal, Soft, No tenderness, No masses, No pulsatile masses. No peritoneal signs.  Skin: Warm, Dry, faint erythema to bilateral lower extremities below the knee  Back: No bony tenderness, No CVA tenderness.   Extremities: Intact distal pulses, bilateral lower extremity pitting edema, faint tenderness, No cyanosis  Neurologic: Alert , Normal motor function and gait, Normal sensory function, No focal deficits noted.       DIAGNOSTIC STUDIES / PROCEDURES    EKG  10/29/2018 at 1:23 AM  Atrial fibrillation with a rate of 89  QRS, QTC within normal limits  No ST elevations or depressions  Normal axis    LABS  Labs Reviewed   CBC WITH DIFFERENTIAL - Abnormal; Notable for the following:        Result Value    RBC  4.15 (*)     Hemoglobin 11.9 (*)     Hematocrit 37.7 (*)     MCHC 31.6 (*)     All other components within normal limits   COMP METABOLIC PANEL - Abnormal; Notable for the following:     Glucose 103 (*)     Bun 28 (*)     Globulin 3.6 (*)     All other components within normal limits   BTYPE NATRIURETIC PEPTIDE - Abnormal; Notable for the following:     B Natriuretic Peptide 121 (*)     All other components within normal limits   TROPONIN   ESTIMATED GFR       RADIOLOGY  No orders to display       COURSE & MEDICAL DECISION MAKING  Pertinent Labs & Imaging studies reviewed. (See chart for details)  53 y.o. male presenting with chronic lower extremity swelling.  Has bilateral lower extremity swelling and slight erythema.  Has been here several times over the past week for similar symptoms.  No acute DVTs were identified on ultrasound recently.  The patient is on Xarelto for chronic A. fib.  He does have a history of depressed EF and is on Lasix.    Has chronic chest discomfort symptoms with negative EKG and troponin for signs of ischemia.  Less likely ACS.    The patient does not appear to be septic.  Does not appear to have much improvement despite antibiotic interventions.  The patient's lower extremity swelling and discomfort and erythema are more likely secondary to venous stasis rather than cellulitis.  Low suspicion for necrotizing fasciitis.    To elevate lower extremities and recommending compression stockings.  To follow-up with primary care physician for further management.    The patient will return for worsening symptoms or failure of improvement and is stable at the time of discharge. The patient verbalizes understanding in their own words.    /77   Pulse 96   Temp 36.2 °C (97.1 °F)   Resp 17   Ht 1.829 m (6')   Wt (!) 126.7 kg (279 lb 5.2 oz)   SpO2 100%   BMI 37.88 kg/m²     The patient was referred to primary care where they will receive further BP management.      Jerad Lomax M.D.  [address]  [facility]  [address]  [place] [address]  [phone]    Schedule an appointment as soon as possible for a visit       [facility], Emergency Dept  [address] [facility] [id number] [phone]    As needed, If symptoms worsen      FINAL IMPRESSION  1. Leg swelling    2. Peripheral edema    3. Venous stasis of both lower extremities            Electronically signed by: Diogenes Wills 10/29/2018 12:36 AM

## 2018-10-29 NOTE — ED TRIAGE NOTES
"[facility] of bilateral leg swelling, redness and pain  Pt states \"My cellulitis and lymphedema is worse\"  Pt ambulates with a cane, he reports trouble walking due to pain and swelling  Pt able to stand on scale in triage without assist  Pt has swollen bilateral LE no drainage or odor noted skin with hyperpigmentation, legs are tender to touch   Pt placed in er lobby, pt educated on triage process, pt instructed to return to triage nurse for concerns or changes in condition, pt verbalizes understanding of instructions.  "

## 2018-10-30 ENCOUNTER — PATIENT OUTREACH (OUTPATIENT)
Dept: HEALTH INFORMATION MANAGEMENT | Facility: OTHER | Age: 53
End: 2018-10-30

## 2018-10-30 NOTE — LETTER
Sebastián Castro  [address] [place] [address]    October 30, 2018      Dear Sebastián Castro,    [facility] wants to ensure your discharge home is safe and you or your loved ones have had all of your questions answered regarding your care after you leave the hospital.    Our discharge team was unsuccessful in our attempts to contact you telephonically and we wanted to be sure that you had a list of resources and contact information should you have any questions regarding your hospital stay, follow-up instructions, or active medical symptoms.    Questions or Concerns Regarding… Contact   Medical Questions Related to Your Discharge  (7 days a week, 8am-5pm) Contact a Nurse Care Coordinator   [phone]   Medical Questions Not Related to Your Discharge  (24 hours a day / 7 days a week)  Contact the Nurse Health Line   [phone]    Medications or Discharge Instructions Refer to your discharge packet   or contact your -[phone]   Follow-up Appointment(s) Schedule your appointment via [employer]   or contact Scheduling [phone]   Billing Review your statement via [employer]  or contact Billing [phone]   Medical Records Review your records via [employer]   or contact Medical Records [phone]     You can also easily access your medical information, test results and upcoming appointments via the [employer] free online health management tool. You can learn more and sign up at [employer]/Lelong. For assistance setting up your Lelong account, please call [phone].    Once again, we want to ensure your discharge home is safe and that you have a clear understanding of any next steps in your care. If you have any questions or concerns, please do not hesitate to contact us, we are here for you. Thank you for choosing [facility] for your healthcare needs.    Sincerely,      Your [facility] Healthcare Team

## 2018-10-30 NOTE — PROGRESS NOTES
Placed discharge outreach phone call to pt s/p ER discharge 10/29/18.  Received recording stating that pt's phone is out of service.  Discharge outreach letter mailed to pt.

## 2018-11-13 ENCOUNTER — HOSPITAL ENCOUNTER (EMERGENCY)
Facility: MEDICAL CENTER | Age: 53
End: 2018-11-13
Attending: EMERGENCY MEDICINE
Payer: MEDICAID

## 2018-11-13 ENCOUNTER — APPOINTMENT (OUTPATIENT)
Dept: RADIOLOGY | Facility: MEDICAL CENTER | Age: 53
End: 2018-11-13
Attending: EMERGENCY MEDICINE
Payer: MEDICAID

## 2018-11-13 VITALS
SYSTOLIC BLOOD PRESSURE: 134 MMHG | HEART RATE: 96 BPM | TEMPERATURE: 98.1 F | DIASTOLIC BLOOD PRESSURE: 82 MMHG | OXYGEN SATURATION: 95 % | BODY MASS INDEX: 37.7 KG/M2 | WEIGHT: 278 LBS | RESPIRATION RATE: 15 BRPM
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
## 2018-11-13 PROCEDURE — 99284 EMERGENCY DEPT VISIT MOD MDM: CPT | Mod: 25

## 2018-11-13 PROCEDURE — 70450 CT HEAD/BRAIN W/O DYE: CPT

## 2018-11-13 PROCEDURE — 71045 X-RAY EXAM CHEST 1 VIEW: CPT

## 2018-11-13 PROCEDURE — 72125 CT NECK SPINE W/O DYE: CPT

## 2018-11-13 RX ORDER — ACETAMINOPHEN 325 MG/1
650 TABLET ORAL ONCE
Status: DISCONTINUED | OUTPATIENT
Start: 2018-11-13 | End: 2018-11-13 | Stop reason: HOSPADM

## 2018-11-13 ASSESSMENT — PAIN SCALES - GENERAL: PAINLEVEL_OUTOF10: 6

## 2018-11-13 ASSESSMENT — PAIN DESCRIPTION - DESCRIPTORS: DESCRIPTORS: THROBBING

## 2018-11-13 NOTE — ED TRIAGE NOTES
"Chief Complaint   Patient presents with   • GLF     Pt states he \"stumbled\" and hit the front of his head and R side on the curb. Denies LOC. No hematoma/abrasions noted.   • Rib Pain     R side     /84   Pulse 95   Temp 36.7 °C (98 °F)   Resp 16   Wt (!) 126.1 kg (278 lb)   SpO2 98%   BMI 37.70 kg/m²     [facility] by [facility] from [facility], pt reporting head ache and R rib pain s/p fall. Hx afib and taking xarelto. Pt states fall yesterday at approx 2200. Placed in room BL 16. Complaints and vitals as above. Pt on monitors, all alarms audible. Chart flagged for ERP to see.  "

## 2018-11-13 NOTE — ED PROVIDER NOTES
"ED Provider Note    CHIEF COMPLAINT  Chief Complaint   Patient presents with   • GLF     Pt states he \"stumbled\" and hit the front of his head and R side on the curb. Denies LOC. No hematoma/abrasions noted.   • Rib Pain     R side     Seen at 3:08 AM    HPI  Sebastián Castro is a 53 y.o. male On Xarelto for atrial fibrillation who presents after head trauma.  He states that he stumbled on the curb and struck his forehead on a concrete pole.  He also hit the right side of his chest on the same pole.  He denies any loss of consciousness.  This incident occurred several hours ago.  He notes a moderate headache and some nausea.  He feels that his eyes are blurred at times and he came here to get \"checked out \".    He also notes some mild neck pain from the fall.  He denies any numbness or weakness.    REVIEW OF SYSTEMS  See HPI  PAST MEDICAL HISTORY   has a past medical history of A-fib (HCC); Chest pain; Congestive heart failure (HCC); Diabetes (HCC); Diabetic neuropathy (HCC); Hypercholesterolemia; Hypertension; Morbid obesity (HCC); Noncompliance; and Normal cardiac stress test.    SOCIAL HISTORY  Social History     Social History Main Topics   • Smoking status: Never Smoker   • Smokeless tobacco: Never Used   • Alcohol use No   • Drug use: No   • Sexual activity: Not on file       SURGICAL HISTORY   has a past surgical history that includes toe amputation (Right, 11/10/2017); toe amputation (Right, 1/17/2018); irrigation & debridement ortho (Right, 2/19/2018); and cardiac cath (07/21/2018).    CURRENT MEDICATIONS  Reviewed.  See Encounter Summary.     ALLERGIES  Allergies   Allergen Reactions   • Daptomycin Rash     Body rash  RXN=1/2018     • Pcn [Penicillins] Hives and Rash      Rash & hives  RXN=since a kid   • Unasyn [Ampicillin-Sulbactam Sodium] Hives, Itching and Swelling   • Vancomycin Rash     Red man syndrome       PHYSICAL EXAM  VITAL SIGNS: /84   Pulse 87   Temp 36.7 °C (98 °F)   Resp 16   Wt (!) " 126.1 kg (278 lb)   SpO2 99%   BMI 37.70 kg/m²   Constitutional: Awake, alert in no apparent distress.  HENT: Normocephalic, Bilateral external ears normal. Nose normal.  No signs of trauma on the head.  The patient does have some tenderness in the midline cervical spine.  The neck is overall supple with full range of motion.  Eyes: Conjunctiva normal, non-icteric, EOMI.    Thorax & Lungs: Easy unlabored respirations, diffuse chest wall tenderness.  Cardiovascular:    Abdomen:  No distention  Skin: Visualized skin is  Dry, No erythema, No rash.  No ecchymosis on the chest wall, head or neck.  Extremities:   atraumatic   Neurologic: Alert, Grossly non-focal.   Psychiatric: Affect and Mood normal    RADIOLOGY  CT-HEAD W/O   Final Result      1.  No acute intracranial abnormality.   2.  Mild chronic paranasal sinus disease.      DX-CHEST-PORTABLE (1 VIEW)   Final Result      1.  No acute cardiopulmonary disease.   2.  Stable cardiomegaly.      CT-CSPINE WITHOUT PLUS RECONS    (Results Pending)       Nursing notes and vital signs were reviewed. (See chart for details)    Decision Making:  This is a 53 y.o. year old male who presents with after an apparent trip and fall.  The patient does not have any signs of trauma.  He does frequent the emergency department on a regular basis.  A CT could be avoided using Portuguese head CT criteria however the patient is on anticoagulation, thus a head CT was obtained that is negative for ICH.  Cervical spine is negative as well.  I do not suspect SCIWORA.  Chest x-ray is unremarkable.    In summary, this is a 53-year-old homeless gentleman with frequent visits to the emergency department who presents after an alleged trip and fall without any signs of trauma and negative imaging.  He is stable for discharge.    DISPOSITION:  Patient will be discharged home in good condition.    Discharge Medications:  New Prescriptions    No medications on file       The patient was discharged home  (see d/c instructions) and told to return immediately for any signs or symptoms listed, or any worsening at all.  The patient verbally agreed to the discharge precautions and follow-up plan which is documented in EPIC.    The patient's blood pressure is elevated today. >120/80. I have referred them to primary care for follow up.       FINAL IMPRESSION  1. Fall, initial encounter

## 2018-11-14 ENCOUNTER — PATIENT OUTREACH (OUTPATIENT)
Dept: HEALTH INFORMATION MANAGEMENT | Facility: OTHER | Age: 53
End: 2018-11-14

## 2018-11-14 NOTE — PROGRESS NOTES
Placed discharge outreach phone call to pt s/p ER discharge 11/13/18.  Received recording stating that pt's phone number is not in service.  Discharge outreach letter mailed to pt.

## 2018-11-14 NOTE — LETTER
Sebastián Castro  [address] [place] [address]    November 14, 2018      Dear Sebastián Castro,    [facility] wants to ensure your discharge home is safe and you or your loved ones have had all of your questions answered regarding your care after you leave the hospital.    Our discharge team was unsuccessful in our attempts to contact you telephonically and we wanted to be sure that you had a list of resources and contact information should you have any questions regarding your hospital stay, follow-up instructions, or active medical symptoms.    Questions or Concerns Regarding… Contact   Medical Questions Related to Your Discharge  (7 days a week, 8am-5pm) Contact a Nurse Care Coordinator   [phone]   Medical Questions Not Related to Your Discharge  (24 hours a day / 7 days a week)  Contact the Nurse Health Line   [phone]    Medications or Discharge Instructions Refer to your discharge packet   or contact your -[phone]   Follow-up Appointment(s) Schedule your appointment via [employer]   or contact Scheduling [phone]   Billing Review your statement via [employer]  or contact Billing [phone]   Medical Records Review your records via [employer]   or contact Medical Records [phone]     You can also easily access your medical information, test results and upcoming appointments via the [employer] free online health management tool. You can learn more and sign up at [employer]iant. For assistance setting up your [employer] account, please call [phone].    Once again, we want to ensure your discharge home is safe and that you have a clear understanding of any next steps in your care. If you have any questions or concerns, please do not hesitate to contact us, we are here for you. Thank you for choosing [facility] for your healthcare needs.    Sincerely,      Your [facility] Healthcare Team

## 2018-11-20 ENCOUNTER — OFFICE VISIT (OUTPATIENT)
Dept: INTERNAL MEDICINE | Facility: MEDICAL CENTER | Age: 53
End: 2018-11-20
Payer: MEDICAID

## 2018-11-20 ENCOUNTER — HOSPITAL ENCOUNTER (OUTPATIENT)
Dept: LAB | Facility: MEDICAL CENTER | Age: 53
End: 2018-11-20
Attending: STUDENT IN AN ORGANIZED HEALTH CARE EDUCATION/TRAINING PROGRAM
Payer: MEDICAID

## 2018-11-20 VITALS
RESPIRATION RATE: 20 BRPM | SYSTOLIC BLOOD PRESSURE: 122 MMHG | OXYGEN SATURATION: 98 % | BODY MASS INDEX: 40.12 KG/M2 | WEIGHT: 296.2 LBS | DIASTOLIC BLOOD PRESSURE: 78 MMHG | HEART RATE: 124 BPM | TEMPERATURE: 98.7 F | HEIGHT: 72 IN

## 2018-11-20 DIAGNOSIS — I87.2 VENOUS STASIS DERMATITIS OF BOTH LOWER EXTREMITIES: ICD-10-CM

## 2018-11-20 DIAGNOSIS — I48.0 PAROXYSMAL A-FIB (HCC): ICD-10-CM

## 2018-11-20 DIAGNOSIS — E11.00 TYPE 2 DIABETES MELLITUS WITH HYPEROSMOLARITY WITHOUT COMA, WITHOUT LONG-TERM CURRENT USE OF INSULIN (HCC): ICD-10-CM

## 2018-11-20 DIAGNOSIS — E78.2 MIXED HYPERLIPIDEMIA: ICD-10-CM

## 2018-11-20 DIAGNOSIS — E87.70 HYPERVOLEMIA, UNSPECIFIED HYPERVOLEMIA TYPE: ICD-10-CM

## 2018-11-20 DIAGNOSIS — R60.0 BILATERAL LEG EDEMA: ICD-10-CM

## 2018-11-20 DIAGNOSIS — I50.22 CHRONIC SYSTOLIC HEART FAILURE (HCC): ICD-10-CM

## 2018-11-20 PROCEDURE — 36415 COLL VENOUS BLD VENIPUNCTURE: CPT

## 2018-11-20 PROCEDURE — 99214 OFFICE O/P EST MOD 30 MIN: CPT | Mod: GC | Performed by: INTERNAL MEDICINE

## 2018-11-20 PROCEDURE — 80053 COMPREHEN METABOLIC PANEL: CPT

## 2018-11-20 RX ORDER — FUROSEMIDE 40 MG/1
40 TABLET ORAL 2 TIMES DAILY
Qty: 180 TAB | Refills: 0 | Status: ON HOLD | OUTPATIENT
Start: 2018-11-20 | End: 2018-12-18

## 2018-11-20 RX ORDER — SPIRONOLACTONE 25 MG/1
25 TABLET ORAL DAILY
Qty: 90 TAB | Refills: 0 | Status: ON HOLD | OUTPATIENT
Start: 2018-11-20 | End: 2018-12-18

## 2018-11-20 RX ORDER — LISINOPRIL 5 MG/1
5 TABLET ORAL DAILY
Qty: 90 TAB | Refills: 0 | Status: ON HOLD | OUTPATIENT
Start: 2018-11-20 | End: 2018-12-18

## 2018-11-20 RX ORDER — POTASSIUM CHLORIDE 750 MG/1
10 TABLET, EXTENDED RELEASE ORAL EVERY 24 HOURS
Refills: 0 | COMMUNITY
Start: 2018-10-30 | End: 2018-11-20

## 2018-11-20 RX ORDER — TRIAMCINOLONE ACETONIDE 0.25 MG/G
CREAM TOPICAL
Qty: 1 TUBE | Refills: 2 | Status: SHIPPED | OUTPATIENT
Start: 2018-11-20 | End: 2018-11-21 | Stop reason: CLARIF

## 2018-11-20 ASSESSMENT — ENCOUNTER SYMPTOMS
CHILLS: 0
ORTHOPNEA: 1
TINGLING: 1
NAUSEA: 0
VOMITING: 0
WEAKNESS: 1
HEADACHES: 0
SENSORY CHANGE: 1
ABDOMINAL PAIN: 0
PALPITATIONS: 1
SPUTUM PRODUCTION: 0
COUGH: 0
FEVER: 0
SHORTNESS OF BREATH: 1
DIZZINESS: 0

## 2018-11-20 NOTE — PATIENT INSTRUCTIONS
"- please maintain a low salt diet  - increase lasix to twice per day, once in the morning and once at 4pm  - start taking lisinopril  - start taking spironolactone  - use Eucerin cream for leg rash, as well as steroid cream  - referral to anticoagulation clinic made  - please get blood test done 2 days before next appointment  - follow up next Wednesday     DASH Eating Plan  DASH stands for \"Dietary Approaches to Stop Hypertension.\" The DASH eating plan is a healthy eating plan that has been shown to reduce high blood pressure (hypertension). Additional health benefits may include reducing the risk of type 2 diabetes mellitus, heart disease, and stroke. The DASH eating plan may also help with weight loss.  What do I need to know about the DASH eating plan?  For the DASH eating plan, you will follow these general guidelines:  · Choose foods with less than 150 milligrams of sodium per serving (as listed on the food label).  · Use salt-free seasonings or herbs instead of table salt or sea salt.  · Check with your health care provider or pharmacist before using salt substitutes.  · Eat lower-sodium products. These are often labeled as \"low-sodium\" or \"no salt added.\"  · Eat fresh foods. Avoid eating a lot of canned foods.  · Eat more vegetables, fruits, and low-fat dairy products.  · Choose whole grains. Look for the word \"whole\" as the first word in the ingredient list.  · Choose fish and skinless chicken or turkey more often than red meat. Limit fish, poultry, and meat to 6 oz (170 g) each day.  · Limit sweets, desserts, sugars, and sugary drinks.  · Choose heart-healthy fats.  · Eat more home-cooked food and less restaurant, buffet, and fast food.  · Limit fried foods.  · Do not hanson foods. Cook foods using methods such as baking, boiling, grilling, and broiling instead.  · When eating at a restaurant, ask that your food be prepared with less salt, or no salt if possible.  What foods can I eat?  Seek help from a " dietitian for individual calorie needs.  Grains   Whole grain or whole wheat bread. Brown rice. Whole grain or whole wheat pasta. Quinoa, bulgur, and whole grain cereals. Low-sodium cereals. Corn or whole wheat flour tortillas. Whole grain cornbread. Whole grain crackers. Low-sodium crackers.  Vegetables   Fresh or frozen vegetables (raw, steamed, roasted, or grilled). Low-sodium or reduced-sodium tomato and vegetable juices. Low-sodium or reduced-sodium tomato sauce and paste. Low-sodium or reduced-sodium canned vegetables.  Fruits   All fresh, canned (in natural juice), or frozen fruits.  Meat and Other Protein Products   Ground beef (85% or leaner), grass-fed beef, or beef trimmed of fat. Skinless chicken or turkey. Ground chicken or turkey. Pork trimmed of fat. All fish and seafood. Eggs. Dried beans, peas, or lentils. Unsalted nuts and seeds. Unsalted canned beans.  Dairy   Low-fat dairy products, such as skim or 1% milk, 2% or reduced-fat cheeses, low-fat ricotta or cottage cheese, or plain low-fat yogurt. Low-sodium or reduced-sodium cheeses.  Fats and Oils   Tub margarines without trans fats. Light or reduced-fat mayonnaise and salad dressings (reduced sodium). Avocado. Safflower, olive, or canola oils. Natural peanut or almond butter.  Other   Unsalted popcorn and pretzels.  The items listed above may not be a complete list of recommended foods or beverages. Contact your dietitian for more options.   What foods are not recommended?  Grains   White bread. White pasta. White rice. Refined cornbread. Bagels and croissants. Crackers that contain trans fat.  Vegetables   Creamed or fried vegetables. Vegetables in a cheese sauce. Regular canned vegetables. Regular canned tomato sauce and paste. Regular tomato and vegetable juices.  Fruits   Canned fruit in light or heavy syrup. Fruit juice.  Meat and Other Protein Products   Fatty cuts of meat. Ribs, chicken wings, segura, sausage, bologna, salami, chitterlings,  fatback, hot dogs, bratwurst, and packaged luncheon meats. Salted nuts and seeds. Canned beans with salt.  Dairy   Whole or 2% milk, cream, half-and-half, and cream cheese. Whole-fat or sweetened yogurt. Full-fat cheeses or blue cheese. Nondairy creamers and whipped toppings. Processed cheese, cheese spreads, or cheese curds.  Condiments   Onion and garlic salt, seasoned salt, table salt, and sea salt. Canned and packaged gravies. Worcestershire sauce. Tartar sauce. Barbecue sauce. Teriyaki sauce. Soy sauce, including reduced sodium. Steak sauce. Fish sauce. Oyster sauce. Cocktail sauce. Horseradish. Ketchup and mustard. Meat flavorings and tenderizers. Bouillon cubes. Hot sauce. Tabasco sauce. Marinades. Taco seasonings. Relishes.  Fats and Oils   Butter, stick margarine, lard, shortening, ghee, and segura fat. Coconut, palm kernel, or palm oils. Regular salad dressings.  Other   Pickles and olives. Salted popcorn and pretzels.  The items listed above may not be a complete list of foods and beverages to avoid. Contact your dietitian for more information.   Where can I find more information?  National Heart, Lung, and [employer] [place]: www.nhlbi.nih.gov/health/health-topics/topics/dash/  This information is not intended to replace advice given to you by your health care provider. Make sure you discuss any questions you have with your health care provider.  Document Released: 12/06/2012 Document Revised: 05/25/2017 Document Reviewed: 10/22/2014  [employer] Interactive Patient Education © 2017 [employer]

## 2018-11-20 NOTE — PROGRESS NOTES
New Patient to Establish    Reason to establish: New patient to establish    CC: Bilateral leg swelling, establish care    HPI: This is a 53-year-old male, who [facility] a homeless [facility], with past medical history of A. fib, HF (with EF 40% on echo 7/9/2018), DM 2, diabetic neuropathy, hypercholesterolemia and hypertension who presents with bilateral leg swelling for the past 1 month.  Patient says that the edema has been worsening and he found only mild relief lasting 2-3 days with IV Lasix when he presented to the ED on 11/13/2018.  He has been taking Lasix 40 mg daily but it has not been improving his edema.  Patient also complains of dyspnea, orthopnea, chronic palpitations due to his atrial fibrillation, fatigue, and difficulty ambulating due to heaviness in his legs.  Patient also has chronic diabetic neuropathy, and has been feeling increasing pins and needles as well as weakness in his bilateral lower extremity for the past 1 month.  Denies history of liver or kidney disease.  Denies fever, chills, chest pain, dizziness, vision changes, or syncope.    Patient was seen by cardiology on 10/9/2018 and has a history of normal coronary angiography and several admissions for chest pain with A. fib and RVR.  He was diagnosed with tachycardia mediated cardiomyopathy, without ischemia.  Most recent troponin during last presentation to the ED on 11/13/2018 was negative.  As per cardiology note patient has a history of noncompliance with medications, but today patient states that he has been taking all of his medications as instructed.  Patient is currently taking Lopressor 50 mg twice daily, as per cardiology note this is to be increased to 75 mg and then 100 mg twice daily as tolerated for heart rate less than 100.  Patient was to follow-up with cardiology after 2 weeks, but did not go to his appointment.  Cardiology was considering elective cardioversion and consideration of ablative therapy if good heart rate  cannot be achieved.    Patient Active Problem List    Diagnosis Date Noted   • Fall 07/10/2018     Priority: High   • Paroxysmal A-fib ([facility]) 07/09/2018     Priority: High   • Atypical chest pain 07/09/2018     Priority: High   • Diabetic foot ([facility]) 02/17/2018     Priority: High   • Diabetic infection of right foot ([facility]) 02/02/2018     Priority: High   • Lower limb amputation, great toe ([facility]) 01/15/2018     Priority: High   • Gangrene of toe of right foot, s/p recent amputation ([facility]) 11/11/2017     Priority: High   • Urinary retention 09/27/2018     Priority: Medium   • Other chest pain 09/10/2018     Priority: Medium   • Difficulty voiding 09/10/2018     Priority: Medium   • Left knee pain 11/23/2017     Priority: Medium   • Acquired circulating anticoagulants ([facility]) 07/09/2018     Priority: Low   • Essential hypertension 02/08/2018     Priority: Low   • GERD (gastroesophageal reflux disease) 01/15/2018     Priority: Low   • Mixed hyperlipidemia 01/15/2018     Priority: Low   • Type 2 diabetes mellitus, without long-term current use of insulin, with peripheral neuropathy ([facility]) 11/22/2017     Priority: Low   • Candidal intertrigo 11/11/2017     Priority: Low   • Euvolemic Hyponatremia 11/11/2017     Priority: Low   • Obesity 11/11/2017     Priority: Low   • Chronic systolic heart failure ([facility]) 11/20/2018   • Bilateral leg edema 11/20/2018   • Venous stasis dermatitis of both lower extremities 11/20/2018   • Fluid overload 11/20/2018   • Class 2 obesity due to excess calories without serious comorbidity with body mass index (BMI) of 36.0 to 36.9 in adult 10/22/2018   • Nonischemic cardiomyopathy ([facility]) 09/09/2018   • Vitamin B12 deficiency 07/24/2018   • Paralysis of both lower limbs ([facility]) 07/22/2018   • Chest pain 07/20/2018   • Syncope 07/19/2018   • Dizziness 07/13/2018   • Acute conjunctivitis 07/11/2018   • Hyperglycemia 11/11/2017       Past Medical History:   Diagnosis Date   • A-fib ([facility])    • Chest pain     • Congestive heart failure (HCC)    • Diabetes (HCC)    • Diabetic neuropathy (HCC)    • Hypercholesterolemia    • Hypertension    • Morbid obesity (HCC)    • Noncompliance    • Normal cardiac stress test        Current Outpatient Prescriptions   Medication Sig Dispense Refill   • metoprolol (LOPRESSOR) 25 MG Tab Take 1 Tab by mouth 2 times a day. 180 Tab 0   • rivaroxaban (XARELTO) 20 MG Tab tablet Take 1 Tab by mouth with dinner. 90 Tab 0   • furosemide (LASIX) 40 MG Tab Take 1 Tab by mouth 2 Times a Day for 90 days. 180 Tab 0   • lisinopril (PRINIVIL) 5 MG Tab Take 1 Tab by mouth every day for 90 days. 90 Tab 0   • spironolactone (ALDACTONE) 25 MG Tab Take 1 Tab by mouth every day for 90 days. 90 Tab 0   • tamsulosin (FLOMAX) 0.4 MG capsule Take 0.4 mg by mouth ONE-HALF HOUR AFTER BREAKFAST.     • amiodarone (CORDARONE) 200 MG Tab Take 1 Tab by mouth 2 Times a Day. 60 Tab 11   • pravastatin (PRAVACHOL) 20 MG Tab Take 1 Tab by mouth every day. 90 Tab 3     No current facility-administered medications for this visit.        Allergies as of 11/20/2018 - Reviewed 11/20/2018   Allergen Reaction Noted   • Daptomycin Rash 01/26/2018   • Pcn [penicillins] Hives and Rash 02/21/2012   • Unasyn [ampicillin-sulbactam sodium] Hives, Itching, and Swelling 02/18/2018   • Vancomycin Rash 02/20/2018       Social History     Social History   • Marital status:      Spouse name: N/A   • Number of children: N/A   • Years of education: N/A     Occupational History   • Not on file.     Social History Main Topics   • Smoking status: Never Smoker   • Smokeless tobacco: Never Used   • Alcohol use No   • Drug use: No   • Sexual activity: Not on file     Other Topics Concern   • Not on file     Social History Narrative   • No narrative on file       Family History   Problem Relation Age of Onset   • No Known Problems Mother    • No Known Problems Father        Past Surgical History:   Procedure Laterality Date   • CARDIAC CATH   07/21/2018    EF 45%, normal coronaries.   • IRRIGATION & DEBRIDEMENT ORTHO Right 2/19/2018    Procedure: IRRIGATION & DEBRIDEMENT ORTHO-FOOT;  Surgeon: Kirby Lopez M.D.;  Location: SURGERY [facility];  Service: Orthopedics   • TOE AMPUTATION Right 1/17/2018    Procedure: TOE AMPUTATION-1ST RAY;  Surgeon: Doug Delong M.D.;  Location: SURGERY [facility];  Service: Orthopedics   • TOE AMPUTATION Right 11/10/2017    Procedure: TOE AMPUTATION;  Surgeon: Osorio Leo M.D.;  Location: SURGERY [facility];  Service: Orthopedics       ROS: As per HPI. Additional pertinent systems as noted below.    Review of Systems   Constitutional: Negative for chills and fever.   Respiratory: Positive for shortness of breath. Negative for cough and sputum production.    Cardiovascular: Positive for palpitations, orthopnea and leg swelling. Negative for chest pain.   Gastrointestinal: Negative for abdominal pain, nausea and vomiting.   Skin: Negative for itching and rash.   Neurological: Positive for tingling, sensory change and weakness. Negative for dizziness and headaches.   All other systems reviewed and are negative.      /78 (BP Location: Left arm, Patient Position: Sitting, BP Cuff Size: Adult)   Pulse (!) 124   Temp 37.1 °C (98.7 °F) (Temporal)   Resp 20   Ht 1.829 m (6')   Wt (!) 134.4 kg (296 lb 3.2 oz)   SpO2 98%   BMI 40.17 kg/m²     Physical Exam  Physical Exam   Constitutional: He is oriented to person, place, and time. No distress.   Poorly groomed.   HENT:   Head: Normocephalic and atraumatic.   Mouth/Throat: Oropharynx is clear and moist.   Eyes: Pupils are equal, round, and reactive to light. Conjunctivae and EOM are normal.   Neck: Neck supple. No tracheal deviation present. No thyromegaly present.   Cardiovascular: An irregularly irregular rhythm present.   Posterior tibial and dorsalis pedis pulses nonpalpable due to edema.   Pulmonary/Chest: Effort normal. He has no wheezes.    Bibasilar crackles   Abdominal: Soft. He exhibits no distension. There is no tenderness.   Musculoskeletal: He exhibits tenderness.   Massive 4+ pitting edema in bilateral lower extremity, up to hips.  Slow gait due to edema.   Lymphadenopathy:     He has no cervical adenopathy.   Neurological: He is alert and oriented to person, place, and time.   Skin:   Bilateral venous stasis dermatitis, confluent erythema and peeling.  Tender.   Psychiatric: Mood and affect normal.       Assessment and Plan    1. Bilateral leg edema  -Massive bilateral 4+ pitting edema x 1 month, due to heart failure and fluid overload  -No history of kidney or liver dysfunction, creatinine 0.91, LFTs WNL  -Was treated with IV Lasix on 11/13/2018, found relief only for 2-3 days  -On home Lasix 40 daily    -Increase Lasix to 40 twice daily  -Add spironolactone 25 daily  -Patient counseled on maintaining low-salt diet  -CMP in 1 week to monitor potassium and creatinine  -Follow-up in 1 week    2. Venous stasis dermatitis of both lower extremities  -Bilateral confluent erythema with peeling of skin, tender, no sign of infection  -Treat edema, Eucerin moisturizing cream, topical triamcinolone cream daily -to prevent cellulitis  -Follow-up in 1 week    3. Hypervolemia, unspecified hypervolemia type  -Patient is fluid overloaded with 4+ bilateral pitting edema as well as bibasilar crackles and orthopnea  -Increase Lasix to 40 twice daily  -On spironolactone 25 daily  -Maintain low-salt diet  -Follow-up in 1 week    4. Chronic systolic heart failure (HCC)  -Echo 7/9/2018 showed EF 40%  -Tachycardia mediated cardiomyopathy, due to poor rate control of A. fib  -History of medication noncompliance and has not been following up with cardiology  -Currently in fluid overload  -Current heart failure medications: Lasix 40, metoprolol 25 twice daily  -We will start lisinopril 5 mg and spironolactone 25 mg  -Increase Lasix to 40 twice daily  -Follow-up in 1  week and consider increasing doses of spironolactone and lisinopril    5. Paroxysmal A-fib (HCC)  -History of poor rate control, was seen by cardiology 10/9/2018, was supposed to follow-up in 2 weeks but did not  -Currently on Xarelto 20, amiodarone 200 twice daily, and metoprolol 25 twice daily  -Patient was to be considered for cardioversion and ablation  -We will first treat fluid overload and then refer to cardiology for assessment for possible ablation and cardioversion  - consider increasing dose of metoprolol, as suggested by cardiology, at next appointment if rate control is not achieved after diuresis.  Target HR <100  -Follow-up in 1 week    6. Type 2 diabetes mellitus with hyperosmolarity without coma, without long-term current use of insulin (HCC)  -Last hemoglobin A1c 6.3, 10/23/2018  -below target of <7.0  -Continue metformin 500 twice daily  - recheck HBA1c around 1/23/2019    7. Mixed hyperlipidemia  - lipid panel 7/10/2018: total cholesterol 119, HDL 24, LDL 68  - ASCVD 13.0%  - continue pravastatin 20 mg, increase to 40 mg at next appointment, as he needs moderate intensity statin therapy      Risk Assessment (discuss potential complications a function of chronic problems): Patient has a history of medication non-compliance and needs continued counseling regarding his condition, as his heart failure and fluid overload will continue to worsen if he remains non-compliant.    Complexity (discuss number of co-morbidities): DM2, HF with EF 40%, dyslipidemia, HTN, A. Fib, fluid overload    This dictation has been created using a voice recognition software.  The accuracy of the dictation is limited to the abilities of the software.  I expect there may be some errors of grammar and possibly content.  I have made every attempt to manually correct the errors within my dictation.  However errors related to this voice recognition software may still exist and should be interpreted within the appropriate  context.      Signed by: German Hinton M.D.

## 2018-11-21 ENCOUNTER — APPOINTMENT (OUTPATIENT)
Dept: RADIOLOGY | Facility: MEDICAL CENTER | Age: 53
DRG: 287 | End: 2018-11-21
Attending: EMERGENCY MEDICINE
Payer: MEDICAID

## 2018-11-21 ENCOUNTER — HOSPITAL ENCOUNTER (INPATIENT)
Facility: MEDICAL CENTER | Age: 53
LOS: 27 days | DRG: 287 | End: 2018-12-18
Attending: EMERGENCY MEDICINE | Admitting: HOSPITALIST
Payer: MEDICAID

## 2018-11-21 DIAGNOSIS — I48.20 CHRONIC ATRIAL FIBRILLATION (HCC): ICD-10-CM

## 2018-11-21 DIAGNOSIS — I95.1 ORTHOSTATIC HYPOTENSION: ICD-10-CM

## 2018-11-21 DIAGNOSIS — R60.0 BILATERAL LEG EDEMA: ICD-10-CM
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
## 2018-11-21 DIAGNOSIS — R53.81 PHYSICAL DECONDITIONING: ICD-10-CM

## 2018-11-21 DIAGNOSIS — I50.9 ACUTE ON CHRONIC CONGESTIVE HEART FAILURE, UNSPECIFIED HEART FAILURE TYPE (HCC): ICD-10-CM

## 2018-11-21 DIAGNOSIS — E11.00 TYPE 2 DIABETES MELLITUS WITH HYPEROSMOLARITY WITHOUT COMA, WITHOUT LONG-TERM CURRENT USE OF INSULIN (HCC): ICD-10-CM

## 2018-11-21 DIAGNOSIS — R55 SYNCOPE, UNSPECIFIED SYNCOPE TYPE: ICD-10-CM

## 2018-11-21 PROBLEM — I50.23 ACUTE ON CHRONIC SYSTOLIC HEART FAILURE (HCC): Status: ACTIVE | Noted: 2018-11-21

## 2018-11-21 LAB
ALBUMIN SERPL BCP-MCNC: 3.5 G/DL (ref 3.2–4.9)
ALBUMIN SERPL BCP-MCNC: 3.8 G/DL (ref 3.2–4.9)
ALBUMIN/GLOB SERPL: 1 G/DL
ALBUMIN/GLOB SERPL: 1.1 G/DL
ALP SERPL-CCNC: 90 U/L (ref 30–99)
ALP SERPL-CCNC: 96 U/L (ref 30–99)
ALT SERPL-CCNC: 19 U/L (ref 2–50)
ALT SERPL-CCNC: 22 U/L (ref 2–50)
ANION GAP SERPL CALC-SCNC: 7 MMOL/L (ref 0–11.9)
ANION GAP SERPL CALC-SCNC: 8 MMOL/L (ref 0–11.9)
APTT PPP: 43.5 SEC (ref 24.7–36)
AST SERPL-CCNC: 23 U/L (ref 12–45)
AST SERPL-CCNC: 25 U/L (ref 12–45)
BASOPHILS # BLD AUTO: 1.8 % (ref 0–1.8)
BASOPHILS # BLD: 0.09 K/UL (ref 0–0.12)
BILIRUB SERPL-MCNC: 0.6 MG/DL (ref 0.1–1.5)
BILIRUB SERPL-MCNC: 0.7 MG/DL (ref 0.1–1.5)
BNP SERPL-MCNC: 117 PG/ML (ref 0–100)
BUN SERPL-MCNC: 28 MG/DL (ref 8–22)
BUN SERPL-MCNC: 30 MG/DL (ref 8–22)
CALCIUM SERPL-MCNC: 8.9 MG/DL (ref 8.5–10.5)
CALCIUM SERPL-MCNC: 9 MG/DL (ref 8.5–10.5)
CHLORIDE SERPL-SCNC: 101 MMOL/L (ref 96–112)
CHLORIDE SERPL-SCNC: 104 MMOL/L (ref 96–112)
CO2 SERPL-SCNC: 26 MMOL/L (ref 20–33)
CO2 SERPL-SCNC: 28 MMOL/L (ref 20–33)
CREAT SERPL-MCNC: 0.86 MG/DL (ref 0.5–1.4)
CREAT SERPL-MCNC: 0.96 MG/DL (ref 0.5–1.4)
EKG IMPRESSION: NORMAL
EOSINOPHIL # BLD AUTO: 0.31 K/UL (ref 0–0.51)
EOSINOPHIL NFR BLD: 6.1 % (ref 0–6.9)
ERYTHROCYTE [DISTWIDTH] IN BLOOD BY AUTOMATED COUNT: 46.2 FL (ref 35.9–50)
FASTING STATUS PATIENT QL REPORTED: NORMAL
GIANT PLATELETS BLD QL SMEAR: NORMAL
GLOBULIN SER CALC-MCNC: 3.4 G/DL (ref 1.9–3.5)
GLOBULIN SER CALC-MCNC: 3.5 G/DL (ref 1.9–3.5)
GLUCOSE SERPL-MCNC: 102 MG/DL (ref 65–99)
GLUCOSE SERPL-MCNC: 116 MG/DL (ref 65–99)
HCT VFR BLD AUTO: 36.3 % (ref 42–52)
HGB BLD-MCNC: 11.4 G/DL (ref 14–18)
INR PPP: 2.12 (ref 0.87–1.13)
LYMPHOCYTES # BLD AUTO: 1.34 K/UL (ref 1–4.8)
LYMPHOCYTES NFR BLD: 26.3 % (ref 22–41)
MAGNESIUM SERPL-MCNC: 1.9 MG/DL (ref 1.5–2.5)
MANUAL DIFF BLD: NORMAL
MCH RBC QN AUTO: 28.2 PG (ref 27–33)
MCHC RBC AUTO-ENTMCNC: 31.4 G/DL (ref 33.7–35.3)
MCV RBC AUTO: 89.9 FL (ref 81.4–97.8)
METAMYELOCYTES NFR BLD MANUAL: 0.9 %
MONOCYTES # BLD AUTO: 0.22 K/UL (ref 0–0.85)
MONOCYTES NFR BLD AUTO: 4.4 % (ref 0–13.4)
MORPHOLOGY BLD-IMP: NORMAL
NEUTROPHILS # BLD AUTO: 3.09 K/UL (ref 1.82–7.42)
NEUTROPHILS NFR BLD: 60.5 % (ref 44–72)
NRBC # BLD AUTO: 0 K/UL
NRBC BLD-RTO: 0 /100 WBC
OVALOCYTES BLD QL SMEAR: NORMAL
PLATELET # BLD AUTO: 180 K/UL (ref 164–446)
PLATELET BLD QL SMEAR: NORMAL
PMV BLD AUTO: 10.2 FL (ref 9–12.9)
POTASSIUM SERPL-SCNC: 3.6 MMOL/L (ref 3.6–5.5)
POTASSIUM SERPL-SCNC: 4 MMOL/L (ref 3.6–5.5)
PROT SERPL-MCNC: 6.9 G/DL (ref 6–8.2)
PROT SERPL-MCNC: 7.3 G/DL (ref 6–8.2)
PROTHROMBIN TIME: 23.8 SEC (ref 12–14.6)
RBC # BLD AUTO: 4.04 M/UL (ref 4.7–6.1)
RBC BLD AUTO: PRESENT
SMUDGE CELLS BLD QL SMEAR: NORMAL
SODIUM SERPL-SCNC: 137 MMOL/L (ref 135–145)
SODIUM SERPL-SCNC: 137 MMOL/L (ref 135–145)
TROPONIN I SERPL-MCNC: <0.01 NG/ML (ref 0–0.04)
TROPONIN I SERPL-MCNC: <0.01 NG/ML (ref 0–0.04)
TSH SERPL DL<=0.005 MIU/L-ACNC: 6.63 UIU/ML (ref 0.38–5.33)
WBC # BLD AUTO: 5.1 K/UL (ref 4.8–10.8)

## 2018-11-21 PROCEDURE — 83880 ASSAY OF NATRIURETIC PEPTIDE: CPT

## 2018-11-21 PROCEDURE — 85610 PROTHROMBIN TIME: CPT

## 2018-11-21 PROCEDURE — 93005 ELECTROCARDIOGRAM TRACING: CPT | Performed by: EMERGENCY MEDICINE

## 2018-11-21 PROCEDURE — 96374 THER/PROPH/DIAG INJ IV PUSH: CPT

## 2018-11-21 PROCEDURE — 36415 COLL VENOUS BLD VENIPUNCTURE: CPT

## 2018-11-21 PROCEDURE — E0191 PROTECTOR HEEL OR ELBOW: HCPCS | Performed by: HOSPITALIST

## 2018-11-21 PROCEDURE — 85730 THROMBOPLASTIN TIME PARTIAL: CPT

## 2018-11-21 PROCEDURE — 84484 ASSAY OF TROPONIN QUANT: CPT

## 2018-11-21 PROCEDURE — 83735 ASSAY OF MAGNESIUM: CPT

## 2018-11-21 PROCEDURE — 700102 HCHG RX REV CODE 250 W/ 637 OVERRIDE(OP): Performed by: HOSPITALIST

## 2018-11-21 PROCEDURE — 99285 EMERGENCY DEPT VISIT HI MDM: CPT

## 2018-11-21 PROCEDURE — 85027 COMPLETE CBC AUTOMATED: CPT

## 2018-11-21 PROCEDURE — 71045 X-RAY EXAM CHEST 1 VIEW: CPT

## 2018-11-21 PROCEDURE — A9270 NON-COVERED ITEM OR SERVICE: HCPCS | Performed by: HOSPITALIST

## 2018-11-21 PROCEDURE — 84443 ASSAY THYROID STIM HORMONE: CPT

## 2018-11-21 PROCEDURE — 80053 COMPREHEN METABOLIC PANEL: CPT

## 2018-11-21 PROCEDURE — 99223 1ST HOSP IP/OBS HIGH 75: CPT | Performed by: HOSPITALIST

## 2018-11-21 PROCEDURE — 700111 HCHG RX REV CODE 636 W/ 250 OVERRIDE (IP): Performed by: HOSPITALIST

## 2018-11-21 PROCEDURE — 93005 ELECTROCARDIOGRAM TRACING: CPT

## 2018-11-21 PROCEDURE — A6250 SKIN SEAL PROTECT MOISTURIZR: HCPCS | Performed by: HOSPITALIST

## 2018-11-21 PROCEDURE — 770020 HCHG ROOM/CARE - TELE (206)

## 2018-11-21 PROCEDURE — 94760 N-INVAS EAR/PLS OXIMETRY 1: CPT

## 2018-11-21 PROCEDURE — 85007 BL SMEAR W/DIFF WBC COUNT: CPT

## 2018-11-21 RX ORDER — CLONIDINE HYDROCHLORIDE 0.1 MG/1
0.1 TABLET ORAL EVERY 6 HOURS PRN
Status: DISCONTINUED | OUTPATIENT
Start: 2018-11-21 | End: 2018-12-03

## 2018-11-21 RX ORDER — FUROSEMIDE 10 MG/ML
40 INJECTION INTRAMUSCULAR; INTRAVENOUS ONCE
Status: DISCONTINUED | OUTPATIENT
Start: 2018-11-21 | End: 2018-11-21

## 2018-11-21 RX ORDER — SPIRONOLACTONE 25 MG/1
25 TABLET ORAL DAILY
Status: DISCONTINUED | OUTPATIENT
Start: 2018-11-21 | End: 2018-11-27

## 2018-11-21 RX ORDER — PROMETHAZINE HYDROCHLORIDE 25 MG/1
12.5-25 SUPPOSITORY RECTAL EVERY 4 HOURS PRN
Status: DISCONTINUED | OUTPATIENT
Start: 2018-11-21 | End: 2018-12-18 | Stop reason: HOSPADM

## 2018-11-21 RX ORDER — POLYETHYLENE GLYCOL 3350 17 G/17G
1 POWDER, FOR SOLUTION ORAL
Status: DISCONTINUED | OUTPATIENT
Start: 2018-11-21 | End: 2018-12-18 | Stop reason: HOSPADM

## 2018-11-21 RX ORDER — AMIODARONE HYDROCHLORIDE 200 MG/1
200 TABLET ORAL 2 TIMES DAILY
Status: DISCONTINUED | OUTPATIENT
Start: 2018-11-21 | End: 2018-11-22

## 2018-11-21 RX ORDER — POTASSIUM CHLORIDE 20 MEQ/1
20 TABLET, EXTENDED RELEASE ORAL DAILY
Status: DISCONTINUED | OUTPATIENT
Start: 2018-11-21 | End: 2018-11-27

## 2018-11-21 RX ORDER — LISINOPRIL 5 MG/1
5 TABLET ORAL DAILY
Status: DISCONTINUED | OUTPATIENT
Start: 2018-11-21 | End: 2018-11-29

## 2018-11-21 RX ORDER — TRIAMCINOLONE ACETONIDE 0.25 MG/G
CREAM TOPICAL 3 TIMES DAILY
Status: DISCONTINUED | OUTPATIENT
Start: 2018-11-21 | End: 2018-12-01

## 2018-11-21 RX ORDER — PROMETHAZINE HYDROCHLORIDE 25 MG/1
12.5-25 TABLET ORAL EVERY 4 HOURS PRN
Status: DISCONTINUED | OUTPATIENT
Start: 2018-11-21 | End: 2018-12-18 | Stop reason: HOSPADM

## 2018-11-21 RX ORDER — TAMSULOSIN HYDROCHLORIDE 0.4 MG/1
0.4 CAPSULE ORAL
Status: DISCONTINUED | OUTPATIENT
Start: 2018-11-21 | End: 2018-11-27

## 2018-11-21 RX ORDER — LABETALOL HYDROCHLORIDE 5 MG/ML
10 INJECTION, SOLUTION INTRAVENOUS EVERY 4 HOURS PRN
Status: DISCONTINUED | OUTPATIENT
Start: 2018-11-21 | End: 2018-12-12

## 2018-11-21 RX ORDER — ACETAMINOPHEN 325 MG/1
650 TABLET ORAL EVERY 6 HOURS PRN
Status: DISCONTINUED | OUTPATIENT
Start: 2018-11-21 | End: 2018-12-18 | Stop reason: HOSPADM

## 2018-11-21 RX ORDER — BISACODYL 10 MG
10 SUPPOSITORY, RECTAL RECTAL
Status: DISCONTINUED | OUTPATIENT
Start: 2018-11-21 | End: 2018-12-18 | Stop reason: HOSPADM

## 2018-11-21 RX ORDER — ONDANSETRON 2 MG/ML
4 INJECTION INTRAMUSCULAR; INTRAVENOUS EVERY 4 HOURS PRN
Status: DISCONTINUED | OUTPATIENT
Start: 2018-11-21 | End: 2018-12-18 | Stop reason: HOSPADM

## 2018-11-21 RX ORDER — ONDANSETRON 4 MG/1
4 TABLET, ORALLY DISINTEGRATING ORAL EVERY 4 HOURS PRN
Status: DISCONTINUED | OUTPATIENT
Start: 2018-11-21 | End: 2018-12-18 | Stop reason: HOSPADM

## 2018-11-21 RX ORDER — PRAVASTATIN SODIUM 20 MG
20 TABLET ORAL EVERY EVENING
Status: DISCONTINUED | OUTPATIENT
Start: 2018-11-21 | End: 2018-12-18 | Stop reason: HOSPADM

## 2018-11-21 RX ORDER — FUROSEMIDE 10 MG/ML
80 INJECTION INTRAMUSCULAR; INTRAVENOUS
Status: DISCONTINUED | OUTPATIENT
Start: 2018-11-21 | End: 2018-11-22

## 2018-11-21 RX ORDER — NYSTATIN 100000 [USP'U]/G
POWDER TOPICAL 3 TIMES DAILY
Status: DISCONTINUED | OUTPATIENT
Start: 2018-11-21 | End: 2018-12-18 | Stop reason: HOSPADM

## 2018-11-21 RX ORDER — AMOXICILLIN 250 MG
2 CAPSULE ORAL 2 TIMES DAILY
Status: DISCONTINUED | OUTPATIENT
Start: 2018-11-21 | End: 2018-12-18 | Stop reason: HOSPADM

## 2018-11-21 RX ADMIN — FUROSEMIDE 80 MG: 10 INJECTION, SOLUTION INTRAMUSCULAR; INTRAVENOUS at 11:08

## 2018-11-21 RX ADMIN — AMIODARONE HYDROCHLORIDE 200 MG: 200 TABLET ORAL at 13:53

## 2018-11-21 RX ADMIN — NYSTATIN: 100000 POWDER TOPICAL at 18:10

## 2018-11-21 RX ADMIN — SPIRONOLACTONE 25 MG: 25 TABLET ORAL at 13:54

## 2018-11-21 RX ADMIN — POTASSIUM CHLORIDE 20 MEQ: 1500 TABLET, EXTENDED RELEASE ORAL at 11:08

## 2018-11-21 RX ADMIN — RIVAROXABAN 20 MG: 20 TABLET, FILM COATED ORAL at 17:23

## 2018-11-21 RX ADMIN — LISINOPRIL 5 MG: 5 TABLET ORAL at 13:53

## 2018-11-21 RX ADMIN — FUROSEMIDE 80 MG: 10 INJECTION, SOLUTION INTRAMUSCULAR; INTRAVENOUS at 16:26

## 2018-11-21 RX ADMIN — TRIAMCINOLONE ACETONIDE: 0.25 CREAM TOPICAL at 17:23

## 2018-11-21 RX ADMIN — METOPROLOL TARTRATE 25 MG: 25 TABLET, FILM COATED ORAL at 13:53

## 2018-11-21 RX ADMIN — TAMSULOSIN HYDROCHLORIDE 0.4 MG: 0.4 CAPSULE ORAL at 13:53

## 2018-11-21 RX ADMIN — PRAVASTATIN SODIUM 20 MG: 20 TABLET ORAL at 17:23

## 2018-11-21 ASSESSMENT — CHA2DS2 SCORE
PRIOR STROKE OR TIA OR THROMBOEMBOLISM: NO
CHF OR LEFT VENTRICULAR DYSFUNCTION: YES
AGE 65 TO 74: NO
CHA2DS2 VASC SCORE: 3
HYPERTENSION: YES
VASCULAR DISEASE: NO
DIABETES: YES
AGE 75 OR GREATER: NO
SEX: MALE

## 2018-11-21 ASSESSMENT — COGNITIVE AND FUNCTIONAL STATUS - GENERAL
WALKING IN HOSPITAL ROOM: A LITTLE
TOILETING: A LITTLE
SUGGESTED CMS G CODE MODIFIER MOBILITY: CK
STANDING UP FROM CHAIR USING ARMS: A LITTLE
MOVING TO AND FROM BED TO CHAIR: A LITTLE
EATING MEALS: A LITTLE
MOBILITY SCORE: 18
DAILY ACTIVITIY SCORE: 18
PERSONAL GROOMING: A LITTLE
DRESSING REGULAR UPPER BODY CLOTHING: A LITTLE
MOVING FROM LYING ON BACK TO SITTING ON SIDE OF FLAT BED: A LITTLE
DRESSING REGULAR LOWER BODY CLOTHING: A LITTLE
SUGGESTED CMS G CODE MODIFIER DAILY ACTIVITY: CK
HELP NEEDED FOR BATHING: A LITTLE
CLIMB 3 TO 5 STEPS WITH RAILING: A LITTLE
TURNING FROM BACK TO SIDE WHILE IN FLAT BAD: A LITTLE

## 2018-11-21 ASSESSMENT — COPD QUESTIONNAIRES
COPD SCREENING SCORE: 2
HAVE YOU SMOKED AT LEAST 100 CIGARETTES IN YOUR ENTIRE LIFE: NO/DON'T KNOW
DO YOU EVER COUGH UP ANY MUCUS OR PHLEGM?: NO/ONLY WITH OCCASIONAL COLDS OR INFECTIONS
IN THE PAST 12 MONTHS DO YOU DO LESS THAN YOU USED TO BECAUSE OF YOUR BREATHING PROBLEMS: DISAGREE/UNSURE
DURING THE PAST 4 WEEKS HOW MUCH DID YOU FEEL SHORT OF BREATH: NONE/LITTLE OF THE TIME

## 2018-11-21 ASSESSMENT — ENCOUNTER SYMPTOMS
WHEEZING: 0
DEPRESSION: 1
SHORTNESS OF BREATH: 1
ABDOMINAL PAIN: 0
PALPITATIONS: 0
DIARRHEA: 0
VOMITING: 0
BLOOD IN STOOL: 0
NERVOUS/ANXIOUS: 0
CONSTIPATION: 0
FEVER: 0
HEADACHES: 0
LOSS OF CONSCIOUSNESS: 0
EYES NEGATIVE: 1
BRUISES/BLEEDS EASILY: 0
MUSCULOSKELETAL NEGATIVE: 1
NAUSEA: 0
COUGH: 0
SEIZURES: 0
DIZZINESS: 0
DOUBLE VISION: 0
CHILLS: 0
GASTROINTESTINAL NEGATIVE: 1
HEMOPTYSIS: 0
HEARTBURN: 0
FOCAL WEAKNESS: 0
DIAPHORESIS: 0
NEUROLOGICAL NEGATIVE: 1

## 2018-11-21 ASSESSMENT — PATIENT HEALTH QUESTIONNAIRE - PHQ9
SUM OF ALL RESPONSES TO PHQ9 QUESTIONS 1 AND 2: 1
7. TROUBLE CONCENTRATING ON THINGS, SUCH AS READING THE NEWSPAPER OR WATCHING TELEVISION: SEVERAL DAYS
5. POOR APPETITE OR OVEREATING: SEVERAL DAYS
4. FEELING TIRED OR HAVING LITTLE ENERGY: SEVERAL DAYS
8. MOVING OR SPEAKING SO SLOWLY THAT OTHER PEOPLE COULD HAVE NOTICED. OR THE OPPOSITE, BEING SO FIGETY OR RESTLESS THAT YOU HAVE BEEN MOVING AROUND A LOT MORE THAN USUAL: SEVERAL DAYS
2. FEELING DOWN, DEPRESSED, IRRITABLE, OR HOPELESS: SEVERAL DAYS
3. TROUBLE FALLING OR STAYING ASLEEP OR SLEEPING TOO MUCH: SEVERAL DAYS
1. LITTLE INTEREST OR PLEASURE IN DOING THINGS: NOT AT ALL
9. THOUGHTS THAT YOU WOULD BE BETTER OFF DEAD, OR OF HURTING YOURSELF: SEVERAL DAYS
SUM OF ALL RESPONSES TO PHQ QUESTIONS 1-9: 7
6. FEELING BAD ABOUT YOURSELF - OR THAT YOU ARE A FAILURE OR HAVE LET YOURSELF OR YOUR FAMILY DOWN: NOT AL ALL

## 2018-11-21 ASSESSMENT — PAIN SCALES - GENERAL
PAINLEVEL_OUTOF10: 0
PAINLEVEL_OUTOF10: 6

## 2018-11-21 ASSESSMENT — LIFESTYLE VARIABLES
SUBSTANCE_ABUSE: 0
ALCOHOL_USE: NO
EVER_SMOKED: NEVER

## 2018-11-21 NOTE — ASSESSMENT & PLAN NOTE
Rate controlled, s/p electrical cardioversion. Now sinus rhythm  Continue low-dose amiodarone as blood pressure allows.  Continue Xarelto.

## 2018-11-21 NOTE — ASSESSMENT & PLAN NOTE
BP has been relatively stable but continues to be orthostatic with standing.  Will accept a higher baseline blood pressure in order to avoid the low standing blood pressures  - Not currenty on antihypertensives, highest 160's  - was previously taking metop and lisinopril

## 2018-11-21 NOTE — PROGRESS NOTES
2 RN skin assessment done with Yareli RN    Right forehead along hair line multiple small scabs from banging of head at home per patient.  Bilat elbows pink/blanching  Bilat UE dry  Pannus red rash/blanching/moist - ordered interdry  Groin moist and red/blanching - interdry ordered  Scrotum moist/red/blanching   Bilat behind knees red/moist/blanching - interdry ordered  Bilat LE red/swollen-not pitting/dry/calloused/blanching - pictures taken and wound consult placed for prev cellulitis to legs  Right great toe amputation  Outer side of left foot has large bump/bruise and bottom of foot has small non-blanching spot - picture taken and wound consult placed  Right Second toe small non-blanching purple spot -pictures taken and wound consult placed  Bilat heels red/blanching  Sacrum red/blanching - waffle mattress overlay placed and mepilex  Large bruise on right hip - per patient from prev fall a few days ago  Scab on left forearm

## 2018-11-21 NOTE — H&P
Hospital Medicine History & Physical Note    Date of Service  11/21/2018    Primary Care Physician  German Hinton M.D.    Consultants  Cardiology    Code Status  Full code    Chief Complaint  Leg swelling    History of Presenting Illness  53 y.o. male who presented 11/21/2018 with shortness of breath and leg swelling.  The patient at this point has reported that he is gained about 40 pounds of weight in the past 2 weeks.  He has considerable leg swelling bilaterally.  The patient at this point has also rales and shortness of breath on both lungs.  The patient at this point says he has been compliant with his medications as well as his follow-ups despite of this he is gained weight and has not increased his fluid intake he says he only drinks about 1200 mL of fluid per day he has not noticed a decrease in his urination.  The patient at this point is an acute systolic on chronic systolic heart failure and he will need to be aggressively diuresed I am going to put him on 80 mg of Lasix twice daily for the next 48 hours.  We will monitor intake and output very carefully.  Patient will continue at this point with aggressive medication optimization.  He will be at this point educated on heart failure.    Review of Systems  Review of Systems   Constitutional: Positive for malaise/fatigue. Negative for chills, diaphoresis and fever.        Weight gain   HENT: Negative.    Eyes: Negative.  Negative for double vision.   Respiratory: Positive for shortness of breath. Negative for cough, hemoptysis and wheezing.    Cardiovascular: Positive for leg swelling. Negative for chest pain and palpitations.   Gastrointestinal: Negative.  Negative for abdominal pain, blood in stool, constipation, diarrhea, heartburn, nausea and vomiting.   Genitourinary: Negative.  Negative for frequency, hematuria and urgency.   Musculoskeletal: Negative.  Negative for joint pain.   Skin: Negative.  Negative for itching and rash.   Neurological:  Negative.  Negative for dizziness, focal weakness, seizures, loss of consciousness and headaches.   Endo/Heme/Allergies: Negative.  Does not bruise/bleed easily.   Psychiatric/Behavioral: Positive for depression. Negative for substance abuse and suicidal ideas. The patient is not nervous/anxious.    All other systems reviewed and are negative.      Past Medical History   has a past medical history of A-fib (HCC); Chest pain; Congestive heart failure (HCC); Diabetes (HCC); Diabetic neuropathy (HCC); Hypercholesterolemia; Hypertension; Morbid obesity (HCC); Noncompliance; and Normal cardiac stress test.    Surgical History   has a past surgical history that includes toe amputation (Right, 11/10/2017); toe amputation (Right, 1/17/2018); irrigation & debridement ortho (Right, 2/19/2018); and cardiac cath (07/21/2018).     Family History  family history includes No Known Problems in his father and mother.     Social History   reports that he has never smoked. He has never used smokeless tobacco. He reports that he does not drink alcohol or use drugs.    Allergies  Allergies   Allergen Reactions   • Daptomycin Rash     Body rash  RXN=1/2018     • Pcn [Penicillins] Hives and Rash      Rash & hives  RXN=since a kid   • Unasyn [Ampicillin-Sulbactam Sodium] Hives, Itching and Swelling   • Vancomycin Rash     Red man syndrome       Medications  Prior to Admission Medications   Prescriptions Last Dose Informant Patient Reported? Taking?   amiodarone (CORDARONE) 200 MG Tab 11/20/2018 at PM Patient No No   Sig: Take 1 Tab by mouth 2 Times a Day.   furosemide (LASIX) 40 MG Tab 11/20/2018 at PM Patient No No   Sig: Take 1 Tab by mouth 2 Times a Day for 90 days.   lisinopril (PRINIVIL) 5 MG Tab 11/20/2018 at PM Patient No No   Sig: Take 1 Tab by mouth every day for 90 days.   metFORMIN (GLUCOPHAGE) 500 MG Tab 11/20/2018 at PM Patient Yes Yes   Sig: Take 500 mg by mouth 2 Times a Day.   metoprolol (LOPRESSOR) 25 MG Tab 11/20/2018 at  PM Patient No No   Sig: Take 1 Tab by mouth 2 times a day.   pravastatin (PRAVACHOL) 20 MG Tab 11/20/2018 at PM Patient No No   Sig: Take 1 Tab by mouth every day.   rivaroxaban (XARELTO) 20 MG Tab tablet 11/20/2018 at PM Patient No No   Sig: Take 1 Tab by mouth with dinner.   spironolactone (ALDACTONE) 25 MG Tab 11/20/2018 at PM Patient No No   Sig: Take 1 Tab by mouth every day for 90 days.   tamsulosin (FLOMAX) 0.4 MG capsule 11/20/2018 at PM Patient Yes No   Sig: Take 0.4 mg by mouth ONE-HALF HOUR AFTER BREAKFAST.      Facility-Administered Medications: None       Physical Exam  Temp:  [36.7 °C (98.1 °F)] 36.7 °C (98.1 °F)  Pulse:  [94] 94  Resp:  [15-18] 15  BP: (103)/(58) 103/58    Physical Exam   Constitutional: He is oriented to person, place, and time. He appears well-developed and well-nourished. He is cooperative. He appears ill.   HENT:   Head: Normocephalic and atraumatic.   Right Ear: External ear normal.   Left Ear: External ear normal.   Nose: Nose normal.   Mouth/Throat: Oropharynx is clear and moist.   Eyes: Pupils are equal, round, and reactive to light. Conjunctivae and EOM are normal.   Neck: Normal range of motion. Neck supple. No tracheal deviation present.   Cardiovascular: Normal rate and regular rhythm.    No murmur heard.  Pulmonary/Chest: Accessory muscle usage present. No stridor. Tachypnea noted. No respiratory distress. He has decreased breath sounds in the right lower field and the left lower field.   Abdominal: Soft. Bowel sounds are normal. He exhibits distension. There is tenderness.   Musculoskeletal: Normal range of motion. He exhibits edema and tenderness.   Neurological: He is alert and oriented to person, place, and time. He has normal reflexes.   Skin: Skin is warm and dry. No rash noted. No erythema.   Psychiatric: He has a normal mood and affect. His behavior is normal. Judgment and thought content normal.   Nursing note and vitals reviewed.      Laboratory:  Recent Labs       11/21/18   0836   WBC  5.1   RBC  4.04*   HEMOGLOBIN  11.4*   HEMATOCRIT  36.3*   MCV  89.9   MCH  28.2   MCHC  31.4*   RDW  46.2   PLATELETCT  180   MPV  10.2     Recent Labs      11/20/18   1101  11/21/18   0836   SODIUM  137  137   POTASSIUM  3.6  4.0   CHLORIDE  101  104   CO2  28  26   GLUCOSE  116*  102*   BUN  28*  30*   CREATININE  0.86  0.96   CALCIUM  9.0  8.9     Recent Labs      11/20/18   1101  11/21/18   0836   ALTSGPT  22  19   ASTSGOT  25  23   ALKPHOSPHAT  96  90   TBILIRUBIN  0.6  0.7   GLUCOSE  116*  102*     Recent Labs      11/21/18   0836   APTT  43.5*   INR  2.12*     Recent Labs      11/21/18   0836   BNPBTYPENAT  117*         Recent Labs      11/21/18   0836   TROPONINI  <0.01       Urinalysis:    No results found     Imaging:  DX-CHEST-PORTABLE (1 VIEW)   Final Result      1.  Hypoinflation without other evidence for acute cardiopulmonary disease.   2.  Stable cardiomegaly.      EC-ECHOCARDIOGRAM COMPLETE W/O CONT    (Results Pending)         Assessment/Plan:  I anticipate this patient is appropriate for inpatient status.  Anticipate patient will need more than 2 midnights of inpatient stay.    Acute on chronic systolic heart failure (HCC)   Assessment & Plan    Patient at this point will be placed on an aggressive diuresis.  The patient has gained considerable amount of weight.  He has severe dyspnea and severe swelling of both of his feet he is gained about 40 pounds of fluid weight in the past 2 weeks.  The patient will need several days of diuresis and cardiology will need to be consulted.     Class 2 obesity due to excess calories without serious comorbidity with body mass index (BMI) of 37.0 to 37.9 in adult- (present on admission)   Assessment & Plan    Patient will need outpatient weight loss management program.  Body mass index is 37.97 kg/m².       Paroxysmal A-fib (HCC)- (present on admission)   Assessment & Plan    Continue at this point with rate control and  anticoagulation.     Essential hypertension- (present on admission)   Assessment & Plan    Continue with blood pressure management optimization keep systolic blood pressure under 140 diastolic under 90.     Mixed hyperlipidemia- (present on admission)   Assessment & Plan    Low-fat low-cholesterol diet.  Continue with statin.  Lab Results   Component Value Date/Time    CHOLSTRLTOT 119 07/10/2018 02:30 AM    LDL 68 07/10/2018 02:30 AM    HDL 24 (A) 07/10/2018 02:30 AM    TRIGLYCERIDE 136 07/10/2018 02:30 AM               GERD (gastroesophageal reflux disease)- (present on admission)   Assessment & Plan    Continue with proton pump inhibitor.  Currently patient is not having heartburn.      Type 2 diabetes mellitus, without long-term current use of insulin, with peripheral neuropathy (HCC)- (present on admission)   Assessment & Plan    -accus with sliding scale coverage  -diabetic diet  -diabetic education  -follow glycohemoglobin levels long term  -continue with oral antihyperglycemics  -monitor for hypoglycemic episodes and adjust control if he should get low         VTE prophylaxis: Heparin

## 2018-11-21 NOTE — ED NOTES
Patient's urinal emptied upon request. approx 1 L clear urine emptied. Bedside urinal brought back into patient's room.

## 2018-11-21 NOTE — ED PROVIDER NOTES
ED Provider Note    Scribed for Ash Hogan M.D. by Jose F Shetty. 11/21/2018  8:52 AM    Primary care provider: German Hinton M.D.  Means of arrival: EMS  History obtained from: Patient  History limited by: None    CHIEF COMPLAINT  Chief Complaint   Patient presents with   • Chest Pain     pt c/o chronic right sided chest pain x several months worse over the last 2 weeks   • Shortness of Breath     chronic SOB with his chf, worse in past 2 weeks.    • Peripheral Edema     BLE edema worse in last two weeks aswel.        HPI  Sebastián Castro is a 53 y.o. male who presents to the Emergency Department for evaluation of chronic right sided chest pain which is said to have onset several months ago but has worsened over the last two weeks. He also complains of associated chronic shortness of breath bilateral lower extremity edema which are both associated with his chest pain, and have been worsening for the past two weeks as well. He denies any fevers, chills, nausea or vomiting at this time.  Patient says that his legs are so swollen and painful that he is having trouble ambulating.    REVIEW OF SYSTEMS  Pertinent positives include chest pain, shortness of breath, bilateral lower extremity edema.   Pertinent negatives include no fevers, chills, nausea, vomiting.    All other systems reviewed and negative. See HPI for further details.       PAST MEDICAL HISTORY   has a past medical history of A-fib (HCC); Chest pain; Congestive heart failure (HCC); Diabetes (HCC); Diabetic neuropathy (HCC); Hypercholesterolemia; Hypertension; Morbid obesity (HCC); Noncompliance; and Normal cardiac stress test.    SURGICAL HISTORY   has a past surgical history that includes toe amputation (Right, 11/10/2017); toe amputation (Right, 1/17/2018); irrigation & debridement ortho (Right, 2/19/2018); and cardiac cath (07/21/2018).    SOCIAL HISTORY  Social History   Substance Use Topics   • Smoking status: Never Smoker   • Smokeless  tobacco: Never Used   • Alcohol use No      History   Drug Use No       FAMILY HISTORY  Family History   Problem Relation Age of Onset   • No Known Problems Mother    • No Known Problems Father        CURRENT MEDICATIONS  No current facility-administered medications on file prior to encounter.      Current Outpatient Prescriptions on File Prior to Encounter   Medication Sig Dispense Refill   • metoprolol (LOPRESSOR) 25 MG Tab Take 1 Tab by mouth 2 times a day. 180 Tab 0   • rivaroxaban (XARELTO) 20 MG Tab tablet Take 1 Tab by mouth with dinner. 90 Tab 0   • furosemide (LASIX) 40 MG Tab Take 1 Tab by mouth 2 Times a Day for 90 days. 180 Tab 0   • lisinopril (PRINIVIL) 5 MG Tab Take 1 Tab by mouth every day for 90 days. 90 Tab 0   • spironolactone (ALDACTONE) 25 MG Tab Take 1 Tab by mouth every day for 90 days. 90 Tab 0   • triamcinolone acetonide (KENALOG) 0.025 % Cream Apply to leg rash daily. 1 Tube 2   • tamsulosin (FLOMAX) 0.4 MG capsule Take 0.4 mg by mouth ONE-HALF HOUR AFTER BREAKFAST.     • amiodarone (CORDARONE) 200 MG Tab Take 1 Tab by mouth 2 Times a Day. 60 Tab 11   • pravastatin (PRAVACHOL) 20 MG Tab Take 1 Tab by mouth every day. 90 Tab 3     ALLERGIES  Allergies   Allergen Reactions   • Daptomycin Rash     Body rash  RXN=1/2018     • Pcn [Penicillins] Hives and Rash      Rash & hives  RXN=since a kid   • Unasyn [Ampicillin-Sulbactam Sodium] Hives, Itching and Swelling   • Vancomycin Rash     Red man syndrome       PHYSICAL EXAM  VITAL SIGNS: /58   Pulse 94   Temp 36.7 °C (98.1 °F) (Temporal)   Resp 16   Ht 1.829 m (6')   Wt (!) 127 kg (280 lb)   BMI 37.97 kg/m²     Nursing note and vitals reviewed.  Constitutional: Obese. No distress.   HENT: Head is normocephalic and atraumatic. Oropharynx is clear and moist without exudate or erythema.   Eyes: Pupils are equal, round, and reactive to light. Conjunctiva are normal.   Cardiovascular: Normal rate and regular rhythm. No murmur heard. Normal  radial pulses.  Pulmonary/Chest: Breath sounds normal. No wheezes or rales.   Abdominal: Soft and non-tender. No distention    Musculoskeletal: 3+ bilateral lower extremity edema to waist, Extremities exhibit normal range of motion  Neurological: Awake, alert and oriented to person, place, and time. No focal deficits noted.  Skin: Skin is warm and dry. No rash.   Psychiatric: Normal mood and affect. Appropriate for clinical situation.    DIAGNOSTIC STUDIES / PROCEDURES    EKG Interpretation  See labs below. Interpreted by me.    LABS  Results for orders placed or performed during the hospital encounter of 11/21/18   CBC WITH DIFFERENTIAL   Result Value Ref Range    WBC 5.1 4.8 - 10.8 K/uL    RBC 4.04 (L) 4.70 - 6.10 M/uL    Hemoglobin 11.4 (L) 14.0 - 18.0 g/dL    Hematocrit 36.3 (L) 42.0 - 52.0 %    MCV 89.9 81.4 - 97.8 fL    MCH 28.2 27.0 - 33.0 pg    MCHC 31.4 (L) 33.7 - 35.3 g/dL    RDW 46.2 35.9 - 50.0 fL    Platelet Count 180 164 - 446 K/uL    MPV 10.2 9.0 - 12.9 fL    Nucleated RBC 0.00 /100 WBC    NRBC (Absolute) 0.00 K/uL    Neutrophils-Polys 60.50 44.00 - 72.00 %    Lymphocytes 26.30 22.00 - 41.00 %    Monocytes 4.40 0.00 - 13.40 %    Eosinophils 6.10 0.00 - 6.90 %    Basophils 1.80 0.00 - 1.80 %    Neutrophils (Absolute) 3.09 1.82 - 7.42 K/uL    Lymphs (Absolute) 1.34 1.00 - 4.80 K/uL    Monos (Absolute) 0.22 0.00 - 0.85 K/uL    Eos (Absolute) 0.31 0.00 - 0.51 K/uL    Baso (Absolute) 0.09 0.00 - 0.12 K/uL   COMP METABOLIC PANEL   Result Value Ref Range    Sodium 137 135 - 145 mmol/L    Potassium 4.0 3.6 - 5.5 mmol/L    Chloride 104 96 - 112 mmol/L    Co2 26 20 - 33 mmol/L    Anion Gap 7.0 0.0 - 11.9    Glucose 102 (H) 65 - 99 mg/dL    Bun 30 (H) 8 - 22 mg/dL    Creatinine 0.96 0.50 - 1.40 mg/dL    Calcium 8.9 8.5 - 10.5 mg/dL    AST(SGOT) 23 12 - 45 U/L    ALT(SGPT) 19 2 - 50 U/L    Alkaline Phosphatase 90 30 - 99 U/L    Total Bilirubin 0.7 0.1 - 1.5 mg/dL    Albumin 3.5 3.2 - 4.9 g/dL    Total Protein 6.9  6.0 - 8.2 g/dL    Globulin 3.4 1.9 - 3.5 g/dL    A-G Ratio 1.0 g/dL   BTYPE NATRIURETIC PEPTIDE   Result Value Ref Range    B Natriuretic Peptide 117 (H) 0 - 100 pg/mL   TROPONIN   Result Value Ref Range    Troponin I <0.01 0.00 - 0.04 ng/mL   APTT   Result Value Ref Range    APTT 43.5 (H) 24.7 - 36.0 sec   PROTHROMBIN TIME (INR)   Result Value Ref Range    PT 23.8 (H) 12.0 - 14.6 sec    INR 2.12 (H) 0.87 - 1.13   ESTIMATED GFR   Result Value Ref Range    GFR If African American >60 >60 mL/min/1.73 m 2    GFR If Non African American >60 >60 mL/min/1.73 m 2   DIFFERENTIAL MANUAL   Result Value Ref Range    Metamyelocytes 0.90 %    Manual Diff Status PERFORMED    PERIPHERAL SMEAR REVIEW   Result Value Ref Range    Peripheral Smear Review see below    PLATELET ESTIMATE   Result Value Ref Range    Plt Estimation Normal    MORPHOLOGY   Result Value Ref Range    RBC Morphology Present     Giant Platelets 1+     Ovalocytes 1+     Smudge Cells Few    TSH (Thyroid Stimulating Hormone)   Result Value Ref Range    TSH 6.630 (H) 0.380 - 5.330 uIU/mL   EKG   Result Value Ref Range    Report       [facility] Emergency Dept.    Test Date:  2018  Pt Name:    EMELY FLORES                Department: ER  MRN:        [id number]                      Room:       [facility]  Gender:     Male                         Technician: [id number]  :        1965                   Requested By:ER TRIAGE PROTOCOL  Order #:    [id number]                    Reading MD: NAEEM ALVARENGA MD    Measurements  Intervals                                Axis  Rate:       86                           P:  NE:                                      QRS:        -1  QRSD:       102                          T:          57  QT:         412  QTc:        493    Interpretive Statements  ATRIAL FIBRILLATION  LOW VOLTAGE IN FRONTAL LEADS  BORDERLINE PROLONGED QT INTERVAL  Compared to ECG 10/29/2018 01:23:06  No acute changes    Electronically Signed On  11- 8:38:36 PST by ASH HOGAN MD       All labs reviewed by me.    RADIOLOGY  DX-CHEST-PORTABLE (1 VIEW)   Final Result      1.  Hypoinflation without other evidence for acute cardiopulmonary disease.   2.  Stable cardiomegaly.        The radiologist's interpretation of all radiological studies have been reviewed by me.    COURSE & MEDICAL DECISION MAKING  Nursing notes, VS, PMSFHx reviewed in chart.     Review of past medical records shows the patient was seen here on 11/13/18 for rib pain after falling on his right side     8:52 AM - Patient seen and examined at bedside.  Ordered DX-Chest 1 view, APTT, PT(INR), CBC with differential, CMP, BNP, Troponin, EKG to evaluate his symptoms. The differential diagnoses include but are not limited to: Presents today with an apparent CHF exacerbation.    The patient presents today with marked bilateral lower extremity edema.  Interestingly BNP is only elevated at 117.  However the patient clearly has liters worth of edema in his lower extremities.  I have treated the patient with Lasix.  Patient will be admitted to the hospital for further evaluation and treatment.    9:52 AM - Paged the hospitalist    1000 AM - I spoke with Dr. Mccann, Hospitalist, who agrees to admit the patient.    DISPOSITION:  Patient will be admitted to Dr. Mccann in stable condition.    FINAL IMPRESSION  1. Acute on chronic congestive heart failure, unspecified heart failure type (HCC)    2. Bilateral leg edema          Jose F CLARKE (Antonina), am scribing for, and in the presence of, Ash Hogan M.D..    Electronically signed by: Jose F Shetty (Antonina), 11/21/2018    Ash CLARKE M.D. personally performed the services described in this documentation, as scribed by Jose F Shetty in my presence, and it is both accurate and complete. C.    The note accurately reflects work and decisions made by me.  Ash Hogan  11/21/2018  11:34 AM

## 2018-11-21 NOTE — ED NOTES
Med Rec Updated and Complete per Pt at bedside  Allergies Reviewed  No PO ABX last 30 days    Pt states he started taking his 11/20/18 RX medications, except for the Triamcinolone Cream which he never picked up.     Pt states he is taking Xarelto 20mg nightly on a regular basis with his last dose 11/20/18 PM.

## 2018-11-21 NOTE — CARE PLAN
Problem: Safety  Goal: Will remain free from injury  Outcome: PROGRESSING AS EXPECTED  Discussed with the patient the importance of calling for assistance prior to getting out of bed in order to help prevent falls since patient has history of falls. Patient accepting of the information. All questions answered at this time.     Problem: Infection  Goal: Will remain free from infection  Outcome: PROGRESSING AS EXPECTED  Discussed with patient the importance of washing hands before and after eating or using the restroom in order to help prevent infections. Patient accepting of the information. All questions answered at this time.

## 2018-11-21 NOTE — PROGRESS NOTES
Patient arrived on floor via gurney with Anu PÉREZ. Patient placed on tele monitor. Monitor room notified. Per Tejal BOO patient is A fib on the monitor at this time. Patient still has chest pain of 6/10 sharp non radiating that has been continuous for the past 2 weeks. Patient declines any other needs at this time. Plan of care discussed with patient. Bed locked and in the lowest position with call light within reach.

## 2018-11-21 NOTE — ED TRIAGE NOTES
Chief Complaint   Patient presents with   • Chest Pain     pt c/o chronic right sided chest pain x several months worse over the last 2 weeks   • Shortness of Breath     chronic SOB with his chf, worse in past 2 weeks.    • Peripheral Edema     BLE edema worse in last two weeks aswel.      Pt BIB ems from apartment, pt c/o right chronic right chest pain, shortness of breath, and peripheral edema. All of which are worse in the past 2 weeks.

## 2018-11-21 NOTE — ASSESSMENT & PLAN NOTE
ECHO with EF 60%. Given diastolic dysfunction and A fib, likely an element of lost atrial kick.  Still in sinus rhythm.  - cardiology evaluated.  - s/p 11/26 DCCV for atrial fibrillation  - still orthostatic despite cardioversion and return to sinus rhythm  Continue with low-dose amiodarone as blood pressure allows.

## 2018-11-21 NOTE — ASSESSMENT & PLAN NOTE
A1C 6.3%, without hyperglycemia. Holding home metformin.  - Sugars are at goal and has not required correctional insulin

## 2018-11-22 ENCOUNTER — APPOINTMENT (OUTPATIENT)
Dept: CARDIOLOGY | Facility: MEDICAL CENTER | Age: 53
DRG: 287 | End: 2018-11-22
Attending: HOSPITALIST
Payer: MEDICAID

## 2018-11-22 PROBLEM — I50.33 ACUTE ON CHRONIC DIASTOLIC HEART FAILURE (HCC): Status: ACTIVE | Noted: 2018-11-21

## 2018-11-22 LAB
AMPHET UR QL SCN: NEGATIVE
ANION GAP SERPL CALC-SCNC: 8 MMOL/L (ref 0–11.9)
APPEARANCE UR: CLEAR
BARBITURATES UR QL SCN: NEGATIVE
BENZODIAZ UR QL SCN: NEGATIVE
BILIRUB UR QL STRIP.AUTO: NEGATIVE
BUN SERPL-MCNC: 29 MG/DL (ref 8–22)
BZE UR QL SCN: NEGATIVE
CALCIUM SERPL-MCNC: 8.9 MG/DL (ref 8.5–10.5)
CANNABINOIDS UR QL SCN: NEGATIVE
CHLORIDE SERPL-SCNC: 100 MMOL/L (ref 96–112)
CHOLEST SERPL-MCNC: 104 MG/DL (ref 100–199)
CO2 SERPL-SCNC: 30 MMOL/L (ref 20–33)
COLOR UR: YELLOW
CREAT SERPL-MCNC: 1.01 MG/DL (ref 0.5–1.4)
EKG IMPRESSION: NORMAL
ERYTHROCYTE [DISTWIDTH] IN BLOOD BY AUTOMATED COUNT: 44.9 FL (ref 35.9–50)
GLUCOSE BLD-MCNC: 113 MG/DL (ref 65–99)
GLUCOSE BLD-MCNC: 92 MG/DL (ref 65–99)
GLUCOSE SERPL-MCNC: 99 MG/DL (ref 65–99)
GLUCOSE UR STRIP.AUTO-MCNC: NEGATIVE MG/DL
HCT VFR BLD AUTO: 35.7 % (ref 42–52)
HDLC SERPL-MCNC: 27 MG/DL
HGB BLD-MCNC: 11.6 G/DL (ref 14–18)
KETONES UR STRIP.AUTO-MCNC: NEGATIVE MG/DL
LDLC SERPL CALC-MCNC: 60 MG/DL
LEUKOCYTE ESTERASE UR QL STRIP.AUTO: NEGATIVE
LV EJECT FRACT  99904: 60
MCH RBC QN AUTO: 28.8 PG (ref 27–33)
MCHC RBC AUTO-ENTMCNC: 32.5 G/DL (ref 33.7–35.3)
MCV RBC AUTO: 88.6 FL (ref 81.4–97.8)
METHADONE UR QL SCN: NEGATIVE
MICRO URNS: NORMAL
NITRITE UR QL STRIP.AUTO: NEGATIVE
OPIATES UR QL SCN: NEGATIVE
OXYCODONE UR QL SCN: NEGATIVE
PCP UR QL SCN: NEGATIVE
PH UR STRIP.AUTO: 6 [PH]
PLATELET # BLD AUTO: 170 K/UL (ref 164–446)
PMV BLD AUTO: 11 FL (ref 9–12.9)
POTASSIUM SERPL-SCNC: 3.6 MMOL/L (ref 3.6–5.5)
PROPOXYPH UR QL SCN: NEGATIVE
PROT UR QL STRIP: NEGATIVE MG/DL
RBC # BLD AUTO: 4.03 M/UL (ref 4.7–6.1)
RBC UR QL AUTO: NEGATIVE
SODIUM SERPL-SCNC: 138 MMOL/L (ref 135–145)
SP GR UR STRIP.AUTO: 1.01
TRIGL SERPL-MCNC: 87 MG/DL (ref 0–149)
UROBILINOGEN UR STRIP.AUTO-MCNC: 0.2 MG/DL
WBC # BLD AUTO: 5.1 K/UL (ref 4.8–10.8)

## 2018-11-22 PROCEDURE — 770020 HCHG ROOM/CARE - TELE (206)

## 2018-11-22 PROCEDURE — 82962 GLUCOSE BLOOD TEST: CPT

## 2018-11-22 PROCEDURE — 93005 ELECTROCARDIOGRAM TRACING: CPT | Performed by: HOSPITALIST

## 2018-11-22 PROCEDURE — 700111 HCHG RX REV CODE 636 W/ 250 OVERRIDE (IP): Performed by: HOSPITALIST

## 2018-11-22 PROCEDURE — 93010 ELECTROCARDIOGRAM REPORT: CPT | Performed by: INTERNAL MEDICINE

## 2018-11-22 PROCEDURE — 700117 HCHG RX CONTRAST REV CODE 255: Performed by: HOSPITALIST

## 2018-11-22 PROCEDURE — 700102 HCHG RX REV CODE 250 W/ 637 OVERRIDE(OP): Performed by: HOSPITALIST

## 2018-11-22 PROCEDURE — 99232 SBSQ HOSP IP/OBS MODERATE 35: CPT | Performed by: INTERNAL MEDICINE

## 2018-11-22 PROCEDURE — 80048 BASIC METABOLIC PNL TOTAL CA: CPT

## 2018-11-22 PROCEDURE — 80307 DRUG TEST PRSMV CHEM ANLYZR: CPT

## 2018-11-22 PROCEDURE — 81003 URINALYSIS AUTO W/O SCOPE: CPT

## 2018-11-22 PROCEDURE — A9270 NON-COVERED ITEM OR SERVICE: HCPCS | Performed by: HOSPITALIST

## 2018-11-22 PROCEDURE — 99255 IP/OBS CONSLTJ NEW/EST HI 80: CPT | Performed by: INTERNAL MEDICINE

## 2018-11-22 PROCEDURE — A9270 NON-COVERED ITEM OR SERVICE: HCPCS | Performed by: INTERNAL MEDICINE

## 2018-11-22 PROCEDURE — 36415 COLL VENOUS BLD VENIPUNCTURE: CPT

## 2018-11-22 PROCEDURE — 85027 COMPLETE CBC AUTOMATED: CPT

## 2018-11-22 PROCEDURE — 93306 TTE W/DOPPLER COMPLETE: CPT

## 2018-11-22 PROCEDURE — 700102 HCHG RX REV CODE 250 W/ 637 OVERRIDE(OP): Performed by: INTERNAL MEDICINE

## 2018-11-22 PROCEDURE — 93306 TTE W/DOPPLER COMPLETE: CPT | Mod: 26 | Performed by: INTERNAL MEDICINE

## 2018-11-22 PROCEDURE — 80061 LIPID PANEL: CPT

## 2018-11-22 RX ORDER — TORSEMIDE 100 MG/1
100 TABLET ORAL
Status: DISCONTINUED | OUTPATIENT
Start: 2018-11-22 | End: 2018-11-23

## 2018-11-22 RX ORDER — AMIODARONE HYDROCHLORIDE 200 MG/1
400 TABLET ORAL 3 TIMES DAILY
Status: DISCONTINUED | OUTPATIENT
Start: 2018-11-22 | End: 2018-11-26

## 2018-11-22 RX ADMIN — PRAVASTATIN SODIUM 20 MG: 20 TABLET ORAL at 16:56

## 2018-11-22 RX ADMIN — ACETAMINOPHEN 650 MG: 325 TABLET, FILM COATED ORAL at 14:37

## 2018-11-22 RX ADMIN — TRIAMCINOLONE ACETONIDE: 0.25 CREAM TOPICAL at 05:33

## 2018-11-22 RX ADMIN — TRIAMCINOLONE ACETONIDE: 0.25 CREAM TOPICAL at 11:16

## 2018-11-22 RX ADMIN — POTASSIUM CHLORIDE 20 MEQ: 1500 TABLET, EXTENDED RELEASE ORAL at 05:33

## 2018-11-22 RX ADMIN — NYSTATIN: 100000 POWDER TOPICAL at 05:35

## 2018-11-22 RX ADMIN — ACETAMINOPHEN 650 MG: 325 TABLET, FILM COATED ORAL at 08:32

## 2018-11-22 RX ADMIN — METOPROLOL TARTRATE 25 MG: 25 TABLET, FILM COATED ORAL at 05:33

## 2018-11-22 RX ADMIN — NYSTATIN: 100000 POWDER TOPICAL at 11:16

## 2018-11-22 RX ADMIN — NYSTATIN: 100000 POWDER TOPICAL at 17:00

## 2018-11-22 RX ADMIN — AMIODARONE HYDROCHLORIDE 400 MG: 200 TABLET ORAL at 11:49

## 2018-11-22 RX ADMIN — TORSEMIDE 100 MG: 100 TABLET ORAL at 11:49

## 2018-11-22 RX ADMIN — AMIODARONE HYDROCHLORIDE 200 MG: 200 TABLET ORAL at 04:20

## 2018-11-22 RX ADMIN — ACETAMINOPHEN 650 MG: 325 TABLET, FILM COATED ORAL at 20:56

## 2018-11-22 RX ADMIN — AMIODARONE HYDROCHLORIDE 400 MG: 200 TABLET ORAL at 17:01

## 2018-11-22 RX ADMIN — RIVAROXABAN 20 MG: 20 TABLET, FILM COATED ORAL at 16:57

## 2018-11-22 RX ADMIN — FUROSEMIDE 80 MG: 10 INJECTION, SOLUTION INTRAMUSCULAR; INTRAVENOUS at 05:33

## 2018-11-22 RX ADMIN — TAMSULOSIN HYDROCHLORIDE 0.4 MG: 0.4 CAPSULE ORAL at 08:33

## 2018-11-22 RX ADMIN — HUMAN ALBUMIN MICROSPHERES AND PERFLUTREN 3 ML: 10; .22 INJECTION, SOLUTION INTRAVENOUS at 10:00

## 2018-11-22 RX ADMIN — TRIAMCINOLONE ACETONIDE: 0.25 CREAM TOPICAL at 17:01

## 2018-11-22 RX ADMIN — LISINOPRIL 5 MG: 5 TABLET ORAL at 05:33

## 2018-11-22 RX ADMIN — SPIRONOLACTONE 25 MG: 25 TABLET ORAL at 05:33

## 2018-11-22 ASSESSMENT — PAIN SCALES - GENERAL
PAINLEVEL_OUTOF10: 5
PAINLEVEL_OUTOF10: 4
PAINLEVEL_OUTOF10: 8
PAINLEVEL_OUTOF10: 5

## 2018-11-22 ASSESSMENT — ENCOUNTER SYMPTOMS
SHORTNESS OF BREATH: 0
WEAKNESS: 1

## 2018-11-22 NOTE — CONSULTS
Cardiology Consult Note:    Sirena To  Date & Time note created:    11/22/2018   10:55 AM     Referring MD:  Dr. Patel    Patient ID:   Name:             Sebastián Castro     YOB: 1965  Age:                 53 y.o.  male   MRN:               [id number]                                                             Chief Complaint / Reason for consult:  Dyspnea, HF exacerbation    History of Present Illness:    This is a 53 years old man with prior history of atrial fibrillation, hypertension, dyslipidemia, diabetes, presented to the hospital because of volume overloaded and shortness of breath.  Patient is currently in heart failure exacerbation.  He is currently on high-dose IV Lasix for diuresis.  Patient is getting better after diuresis.    His repeated transthoracic echocardiogram showed LV function of 60%.  I personally interpreted the images.    I personally interpreted he is twelve-lead EKG tracing which showed evidence of atrial fibrillation.    Negative 6.1 liters over the last 24 hours.  Negative 6.1 liters during hospital stay.      Review of Systems:      Constitutional: Denies fevers, Denies weight changes  Eyes: Denies changes in vision, no eye pain  Ears/Nose/Throat/Mouth: Denies nasal congestion or sore throat   Cardiovascular: no chest pain, no palpitations   Respiratory: yes shortness of breath , Denies cough  Gastrointestinal/Hepatic: Denies abdominal pain, nausea, vomiting, diarrhea, constipation or GI bleeding   Genitourinary: Denies dysuria or frequency  Musculoskeletal/Rheum: Denies  joint pain and swelling   Skin: Denies rash  Neurological: Denies headache, confusion, memory loss or focal weakness/parasthesias  Psychiatric: denies mood disorder   Endocrine: Nayla thyroid problems  Heme/Oncology/Lymph Nodes: Denies enlarged lymph nodes, denies brusing or known bleeding disorder  All other systems were reviewed and are negative (AMA/CMS criteria)                Past  Medical History:   Past Medical History:   Diagnosis Date   • A-fib (HCC)    • Chest pain    • Congestive heart failure (HCC)    • Diabetes (HCC)    • Diabetic neuropathy (HCC)    • Hypercholesterolemia    • Hypertension    • Morbid obesity (HCC)    • Noncompliance    • Normal cardiac stress test      Active Hospital Problems    Diagnosis   • Acute on chronic systolic heart failure (HCC) [I50.23]     Priority: High   • Class 2 obesity due to excess calories without serious comorbidity with body mass index (BMI) of 37.0 to 37.9 in adult [E66.09, Z68.37]     Priority: Medium   • Paroxysmal A-fib (HCC) [I48.0]     Priority: Medium   • Essential hypertension [I10]     Priority: Low   • GERD (gastroesophageal reflux disease) [K21.9]     Priority: Low   • Mixed hyperlipidemia [E78.2]     Priority: Low   • Type 2 diabetes mellitus, without long-term current use of insulin, with peripheral neuropathy (HCC) [E11.9]     Priority: Low       Past Surgical History:  Past Surgical History:   Procedure Laterality Date   • CARDIAC CATH  07/21/2018    EF 45%, normal coronaries.   • IRRIGATION & DEBRIDEMENT ORTHO Right 2/19/2018    Procedure: IRRIGATION & DEBRIDEMENT ORTHO-FOOT;  Surgeon: Kirby Lopez M.D.;  Location: [facility];  Service: Orthopedics   • TOE AMPUTATION Right 1/17/2018    Procedure: TOE AMPUTATION-1ST RAY;  Surgeon: Doug Delong M.D.;  Location: [facility];  Service: Orthopedics   • TOE AMPUTATION Right 11/10/2017    Procedure: TOE AMPUTATION;  Surgeon: Osorio Leo M.D.;  Location: [facility];  Service: Orthopedics       Hospital Medications:    Current Facility-Administered Medications:   •  amiodarone (CORDARONE) tablet 200 mg, 200 mg, Oral, BID, Sandra Mccann M.D., 200 mg at 11/22/18 0420  •  lisinopril (PRINIVIL) tablet 5 mg, 5 mg, Oral, DAILY, Sandra Mccann M.D., 5 mg at 11/22/18 0533  •  metoprolol (LOPRESSOR) tablet 25 mg, 25 mg, Oral, BID, Sandra Mccann  M.D., 25 mg at 11/22/18 0533  •  pravastatin (PRAVACHOL) tablet 20 mg, 20 mg, Oral, Q EVENING, Sandra Mccann M.D., 20 mg at 11/21/18 1723  •  rivaroxaban (XARELTO) tablet 20 mg, 20 mg, Oral, PM MEAL, Sandra Mccann M.D., 20 mg at 11/21/18 1723  •  spironolactone (ALDACTONE) tablet 25 mg, 25 mg, Oral, DAILY, Sandra Mccann M.D., 25 mg at 11/22/18 0533  •  tamsulosin (FLOMAX) capsule 0.4 mg, 0.4 mg, Oral, AFTER BREAKFAST, Sandra Mccann M.D., 0.4 mg at 11/22/18 0833  •  triamcinolone acetonide (KENALOG) 0.025 % cream, , Topical, TID, Sandra Mccann M.D.  •  senna-docusate (PERICOLACE or SENOKOT S) 8.6-50 MG per tablet 2 Tab, 2 Tab, Oral, BID **AND** polyethylene glycol/lytes (MIRALAX) PACKET 1 Packet, 1 Packet, Oral, QDAY PRN **AND** magnesium hydroxide (MILK OF MAGNESIA) suspension 30 mL, 30 mL, Oral, QDAY PRN **AND** bisacodyl (DULCOLAX) suppository 10 mg, 10 mg, Rectal, QDAY PRN, Sandra Mccann M.D.  •  acetaminophen (TYLENOL) tablet 650 mg, 650 mg, Oral, Q6HRS PRN, Sandra Mccann M.D., 650 mg at 11/22/18 0832  •  labetalol (NORMODYNE,TRANDATE) injection 10 mg, 10 mg, Intravenous, Q4HRS PRN, Sandra Mccann M.D.  •  cloNIDine (CATAPRES) tablet 0.1 mg, 0.1 mg, Oral, Q6HRS PRN, Sandra Mccann M.D.  •  furosemide (LASIX) injection 80 mg, 80 mg, Intravenous, BID DIURETIC, Sandra Mccann M.D., 80 mg at 11/22/18 0533  •  potassium chloride SA (Kdur) tablet 20 mEq, 20 mEq, Oral, DAILY, Sandra Mccann M.D., 20 mEq at 11/22/18 0533  •  ondansetron (ZOFRAN) syringe/vial injection 4 mg, 4 mg, Intravenous, Q4HRS PRN, Sandra Mccann M.D.  •  ondansetron (ZOFRAN ODT) dispertab 4 mg, 4 mg, Oral, Q4HRS PRN, Sandra Mccann M.D.  •  promethazine (PHENERGAN) tablet 12.5-25 mg, 12.5-25 mg, Oral, Q4HRS PRN, Sandra Mccann M.D.  •  promethazine (PHENERGAN) suppository 12.5-25 mg, 12.5-25 mg, Rectal, Q4HRS PRN, Sandra Mccann M.D.  •  prochlorperazine (COMPAZINE) injection 5-10 mg, 5-10 mg, Intravenous, Q4HRS PRN, Sandra Mccann M.D.  •   nystatin (MYCOSTATIN) powder, , Topical, TID, Sandra Mccann M.D.    Current Outpatient Medications:  Prescriptions Prior to Admission   Medication Sig Dispense Refill Last Dose   • metFORMIN (GLUCOPHAGE) 500 MG Tab Take 500 mg by mouth 2 Times a Day.   11/20/2018 at PM   • metoprolol (LOPRESSOR) 25 MG Tab Take 1 Tab by mouth 2 times a day. 180 Tab 0 11/20/2018 at PM   • rivaroxaban (XARELTO) 20 MG Tab tablet Take 1 Tab by mouth with dinner. 90 Tab 0 11/20/2018 at PM   • furosemide (LASIX) 40 MG Tab Take 1 Tab by mouth 2 Times a Day for 90 days. 180 Tab 0 11/20/2018 at PM   • lisinopril (PRINIVIL) 5 MG Tab Take 1 Tab by mouth every day for 90 days. 90 Tab 0 11/20/2018 at PM   • spironolactone (ALDACTONE) 25 MG Tab Take 1 Tab by mouth every day for 90 days. 90 Tab 0 11/20/2018 at PM   • tamsulosin (FLOMAX) 0.4 MG capsule Take 0.4 mg by mouth ONE-HALF HOUR AFTER BREAKFAST.   11/20/2018 at PM   • amiodarone (CORDARONE) 200 MG Tab Take 1 Tab by mouth 2 Times a Day. 60 Tab 11 11/20/2018 at PM   • pravastatin (PRAVACHOL) 20 MG Tab Take 1 Tab by mouth every day. 90 Tab 3 11/20/2018 at PM       Medication Allergy:  Allergies   Allergen Reactions   • Daptomycin Rash     Body rash  RXN=1/2018     • Pcn [Penicillins] Hives and Rash      Rash & hives  RXN=since a kid   • Unasyn [Ampicillin-Sulbactam Sodium] Hives, Itching and Swelling   • Vancomycin Rash     Red man syndrome       Family History:  Family History   Problem Relation Age of Onset   • No Known Problems Mother    • No Known Problems Father        Social History:  Social History     Social History   • Marital status:      Spouse name: N/A   • Number of children: N/A   • Years of education: N/A     Occupational History   • Not on file.     Social History Main Topics   • Smoking status: Never Smoker   • Smokeless tobacco: Never Used   • Alcohol use No   • Drug use: No   • Sexual activity: Not on file     Other Topics Concern   • Not on file     Social History  Narrative   • No narrative on file         Physical Exam:  Vitals/ General Appearance:   Weight/BMI: Body mass index is 38.66 kg/m².  Blood pressure 123/76, pulse 65, temperature 36.7 °C (98.1 °F), temperature source Temporal, resp. rate 14, height 1.829 m (6'), weight (!) 129.3 kg (285 lb 0.9 oz), SpO2 96 %.  Vitals:    11/21/18 2016 11/22/18 0122 11/22/18 0516 11/22/18 0800   BP: (!) 97/43 111/61 114/56 123/76   Pulse: 71 74 77 65   Resp: 18 18 18 14   Temp: 36.4 °C (97.6 °F) 36.9 °C (98.5 °F) 36.7 °C (98.1 °F) 36.7 °C (98.1 °F)   TempSrc: Temporal Temporal Temporal Temporal   SpO2: 99% 93% 95% 96%   Weight: (!) 129.3 kg (285 lb 0.9 oz)      Height:         Oxygen Therapy:  Pulse Oximetry: 96 %, O2 (LPM): 0, O2 Delivery: None (Room Air)    Constitutional:   No acute distress  HENMT:  Normocephalic, Atraumatic, Oropharynx moist mucous membranes, No oral exudates, Nose normal.  No thyromegaly.  Eyes:  EOMI, Conjunctiva normal, No discharge.  Neck:  Normal range of motion, No cervical tenderness,  no JVD.  Cardiovascular:  There is presence of an irregularly irregular heartbeats, No murmurs, No rubs, No gallops.   Extremitites with intact distal pulses, no cyanosis, or edema.  Lungs:  Normal breath sounds, breath sounds clear to auscultation bilaterally,  no rales, no rhonchi, no wheezing.   Abdomen: Bowel sounds normal, Soft, No tenderness, No guarding, No rebound, No masses, No hepatosplenomegaly.  Skin: Warm, Dry, No erythema, No rash, no induration.  Neurologic: Alert & oriented x 3, No focal deficits noted, cranial nerves II through X are intact.  Psychiatric: Affect normal, Judgment normal, Mood normal.      MDM (Data Review):     Records reviewed and summarized in current documentation    Lab Data Review:  Recent Results (from the past 24 hour(s))   Troponin - Q4 Hrs X 3    Collection Time: 11/21/18  6:22 PM   Result Value Ref Range    Troponin I <0.01 0.00 - 0.04 ng/mL   Basic Metabolic Panel (BMP)     Collection Time: 18 12:07 AM   Result Value Ref Range    Sodium 138 135 - 145 mmol/L    Potassium 3.6 3.6 - 5.5 mmol/L    Chloride 100 96 - 112 mmol/L    Co2 30 20 - 33 mmol/L    Glucose 99 65 - 99 mg/dL    Bun 29 (H) 8 - 22 mg/dL    Creatinine 1.01 0.50 - 1.40 mg/dL    Calcium 8.9 8.5 - 10.5 mg/dL    Anion Gap 8.0 0.0 - 11.9   Lipid Profile (Lipid Panel) Fasting    Collection Time: 18 12:07 AM   Result Value Ref Range    Cholesterol,Tot 104 100 - 199 mg/dL    Triglycerides 87 0 - 149 mg/dL    HDL 27 (A) >=40 mg/dL    LDL 60 <100 mg/dL   ESTIMATED GFR    Collection Time: 18 12:07 AM   Result Value Ref Range    GFR If African American >60 >60 mL/min/1.73 m 2    GFR If Non African American >60 >60 mL/min/1.73 m 2   CBC without Differential    Collection Time: 18 12:08 AM   Result Value Ref Range    WBC 5.1 4.8 - 10.8 K/uL    RBC 4.03 (L) 4.70 - 6.10 M/uL    Hemoglobin 11.6 (L) 14.0 - 18.0 g/dL    Hematocrit 35.7 (L) 42.0 - 52.0 %    MCV 88.6 81.4 - 97.8 fL    MCH 28.8 27.0 - 33.0 pg    MCHC 32.5 (L) 33.7 - 35.3 g/dL    RDW 44.9 35.9 - 50.0 fL    Platelet Count 170 164 - 446 K/uL    MPV 11.0 9.0 - 12.9 fL   EKG: In the AM    Collection Time: 18  7:10 AM   Result Value Ref Range    Report       [facility]    Test Date:  2018  Pt Name:    EMELY FLORES                Department: 183  MRN:        [id number]                      Room:       T835  Gender:     Male                         Technician: INEZ  :        1965                   Requested By:MARLEN FAY  Order #:    [id number]                    Reading MD: Sirena Larson MD    Measurements  Intervals                                Axis  Rate:       72                           P:  MS:                                      QRS:        3  QRSD:       104                          T:          50  QT:         444  QTc:        486    Interpretive Statements  ATRIAL FIBRILLATION  LOW VOLTAGE IN FRONTAL LEADS  LATE  PRECORDIAL R/S TRANSITION  BORDERLINE PROLONGED QT INTERVAL  Compared to ECG 11/21/2018 08:23:11  No significant changes    Electronically Signed On 11- 9:31:00 PST by Sirena Larson MD     EC-ECHOCARDIOGRAM COMPLETE W/ CONT    Collection Time: 11/22/18  9:40 AM   Result Value Ref Range    Left Ventrical Ejection Fraction 60        Imaging/Procedures Review:    Chest Xray:  Reviewed    EKG:   As in HPI.    MDM (Assessment and Plan):     Active Hospital Problems    Diagnosis   • Acute on chronic systolic heart failure (HCC) [I50.23]     Priority: High   • Class 2 obesity due to excess calories without serious comorbidity with body mass index (BMI) of 37.0 to 37.9 in adult [E66.09, Z68.37]     Priority: Medium   • Paroxysmal A-fib (HCC) [I48.0]     Priority: Medium   • Essential hypertension [I10]     Priority: Low   • GERD (gastroesophageal reflux disease) [K21.9]     Priority: Low   • Mixed hyperlipidemia [E78.2]     Priority: Low   • Type 2 diabetes mellitus, without long-term current use of insulin, with peripheral neuropathy (HCC) [E11.9]     Priority: Low         At this time, presentation of heart failure exacerbation might be related to left atrial dysfunction syndrome secondary to atrial fibrillation.  Therefore I do think that patient will be benefited from sinus restoration.  We will start loading amiodarone therapy.  Cardioversion could be done as an outpatient once patient is more euvolemic.  Will stop metoprolol therapy, he was on low dosage anyhow.  Continue anticoagulation.  Will switch to torsemide 100 mg po daily in anticipation for discharge.        Thank you for referring this patient to our cardiology service.  We will follow patient with you.      Sirena Larson MD.   Cardiology Inpatient Service.  [facility].  [phone].  [place], [place].

## 2018-11-22 NOTE — WOUND TEAM
Wound Care consult received, patient seen in T835-2. Bilateral feet assessed. Pt has non fluid blister on left lateral feet. Pt reports shoes are might tighter since feet are edematous at this time and left lateral feet are rubbing on shoes. Pt reports pain on palpation but intact. Callus noted around base of blister. Hydrocolloid thick dressing ordered for blister on left lateral feet for protection. Bilateral planter feet cleansed w/ warm wash cloth and no rinse foam soap. Left plantar feet intact, small area that is pink but blanching. Bilateral heels assessed, pink but blanching. Unable to palpate DP and PT pulses. Doppler used. Applied Sween cream on bilateral lower extremities. Pannus lifted. Foul odor noted. Pt reports using nystatin at home. Cleansed pannus w/ warm wash cloth and no rinse foam soap. Patted dry. Pt appears to have yeast like rash on pannus. Pannus is very moist. Nystatin and interdry's ordered. Skin orders placed. Pt able to turn to right side. Sacrococcygeal area assessed, pink but blanching. Back of knees assessed, cleansed area w/ warm wash cloth and no rinse foam soap. Skin appears to be intact at this time. Heel float boots ordered. No advanced wound care at this time.

## 2018-11-23 ENCOUNTER — APPOINTMENT (OUTPATIENT)
Dept: RADIOLOGY | Facility: MEDICAL CENTER | Age: 53
DRG: 287 | End: 2018-11-23
Attending: INTERNAL MEDICINE
Payer: MEDICAID

## 2018-11-23 LAB
ANION GAP SERPL CALC-SCNC: 8 MMOL/L (ref 0–11.9)
BASOPHILS # BLD AUTO: 0.6 % (ref 0–1.8)
BASOPHILS # BLD: 0.03 K/UL (ref 0–0.12)
BUN SERPL-MCNC: 28 MG/DL (ref 8–22)
CALCIUM SERPL-MCNC: 8.8 MG/DL (ref 8.5–10.5)
CHLORIDE SERPL-SCNC: 98 MMOL/L (ref 96–112)
CO2 SERPL-SCNC: 31 MMOL/L (ref 20–33)
CREAT SERPL-MCNC: 1.03 MG/DL (ref 0.5–1.4)
EOSINOPHIL # BLD AUTO: 0.2 K/UL (ref 0–0.51)
EOSINOPHIL NFR BLD: 4.3 % (ref 0–6.9)
ERYTHROCYTE [DISTWIDTH] IN BLOOD BY AUTOMATED COUNT: 45.4 FL (ref 35.9–50)
GLUCOSE BLD-MCNC: 120 MG/DL (ref 65–99)
GLUCOSE SERPL-MCNC: 129 MG/DL (ref 65–99)
HCT VFR BLD AUTO: 36.6 % (ref 42–52)
HGB BLD-MCNC: 11.5 G/DL (ref 14–18)
IMM GRANULOCYTES # BLD AUTO: 0 K/UL (ref 0–0.11)
IMM GRANULOCYTES NFR BLD AUTO: 0 % (ref 0–0.9)
LYMPHOCYTES # BLD AUTO: 2.15 K/UL (ref 1–4.8)
LYMPHOCYTES NFR BLD: 46.1 % (ref 22–41)
MAGNESIUM SERPL-MCNC: 1.8 MG/DL (ref 1.5–2.5)
MCH RBC QN AUTO: 27.9 PG (ref 27–33)
MCHC RBC AUTO-ENTMCNC: 31.4 G/DL (ref 33.7–35.3)
MCV RBC AUTO: 88.8 FL (ref 81.4–97.8)
MONOCYTES # BLD AUTO: 0.41 K/UL (ref 0–0.85)
MONOCYTES NFR BLD AUTO: 8.8 % (ref 0–13.4)
NEUTROPHILS # BLD AUTO: 1.87 K/UL (ref 1.82–7.42)
NEUTROPHILS NFR BLD: 40.2 % (ref 44–72)
NRBC # BLD AUTO: 0 K/UL
NRBC BLD-RTO: 0 /100 WBC
PLATELET # BLD AUTO: 173 K/UL (ref 164–446)
PMV BLD AUTO: 11 FL (ref 9–12.9)
POTASSIUM SERPL-SCNC: 3.5 MMOL/L (ref 3.6–5.5)
RBC # BLD AUTO: 4.12 M/UL (ref 4.7–6.1)
SODIUM SERPL-SCNC: 137 MMOL/L (ref 135–145)
WBC # BLD AUTO: 4.7 K/UL (ref 4.8–10.8)

## 2018-11-23 PROCEDURE — 700102 HCHG RX REV CODE 250 W/ 637 OVERRIDE(OP): Performed by: INTERNAL MEDICINE

## 2018-11-23 PROCEDURE — 99221 1ST HOSP IP/OBS SF/LOW 40: CPT | Performed by: PSYCHIATRY & NEUROLOGY

## 2018-11-23 PROCEDURE — 82962 GLUCOSE BLOOD TEST: CPT

## 2018-11-23 PROCEDURE — 70450 CT HEAD/BRAIN W/O DYE: CPT

## 2018-11-23 PROCEDURE — 80048 BASIC METABOLIC PNL TOTAL CA: CPT

## 2018-11-23 PROCEDURE — 770020 HCHG ROOM/CARE - TELE (206)

## 2018-11-23 PROCEDURE — 93010 ELECTROCARDIOGRAM REPORT: CPT | Performed by: INTERNAL MEDICINE

## 2018-11-23 PROCEDURE — A9270 NON-COVERED ITEM OR SERVICE: HCPCS | Performed by: INTERNAL MEDICINE

## 2018-11-23 PROCEDURE — 83735 ASSAY OF MAGNESIUM: CPT

## 2018-11-23 PROCEDURE — 99233 SBSQ HOSP IP/OBS HIGH 50: CPT | Performed by: INTERNAL MEDICINE

## 2018-11-23 PROCEDURE — A9270 NON-COVERED ITEM OR SERVICE: HCPCS | Performed by: HOSPITALIST

## 2018-11-23 PROCEDURE — 93005 ELECTROCARDIOGRAM TRACING: CPT | Performed by: INTERNAL MEDICINE

## 2018-11-23 PROCEDURE — 85025 COMPLETE CBC W/AUTO DIFF WBC: CPT

## 2018-11-23 PROCEDURE — 700102 HCHG RX REV CODE 250 W/ 637 OVERRIDE(OP): Performed by: HOSPITALIST

## 2018-11-23 PROCEDURE — 36415 COLL VENOUS BLD VENIPUNCTURE: CPT

## 2018-11-23 PROCEDURE — 700111 HCHG RX REV CODE 636 W/ 250 OVERRIDE (IP)

## 2018-11-23 RX ORDER — LORAZEPAM 2 MG/ML
INJECTION INTRAMUSCULAR
Status: DISCONTINUED
Start: 2018-11-23 | End: 2018-11-23

## 2018-11-23 RX ORDER — TORSEMIDE 20 MG/1
40 TABLET ORAL
Status: DISCONTINUED | OUTPATIENT
Start: 2018-11-24 | End: 2018-11-26

## 2018-11-23 RX ORDER — LORAZEPAM 2 MG/ML
INJECTION INTRAMUSCULAR
Status: COMPLETED
Start: 2018-11-23 | End: 2018-11-23

## 2018-11-23 RX ADMIN — TRIAMCINOLONE ACETONIDE: 0.25 CREAM TOPICAL at 05:35

## 2018-11-23 RX ADMIN — AMIODARONE HYDROCHLORIDE 400 MG: 200 TABLET ORAL at 12:03

## 2018-11-23 RX ADMIN — NYSTATIN: 100000 POWDER TOPICAL at 12:05

## 2018-11-23 RX ADMIN — ACETAMINOPHEN 650 MG: 325 TABLET, FILM COATED ORAL at 19:55

## 2018-11-23 RX ADMIN — AMIODARONE HYDROCHLORIDE 400 MG: 200 TABLET ORAL at 18:29

## 2018-11-23 RX ADMIN — NYSTATIN: 100000 POWDER TOPICAL at 18:29

## 2018-11-23 RX ADMIN — TORSEMIDE 100 MG: 100 TABLET ORAL at 05:33

## 2018-11-23 RX ADMIN — TAMSULOSIN HYDROCHLORIDE 0.4 MG: 0.4 CAPSULE ORAL at 09:24

## 2018-11-23 RX ADMIN — LORAZEPAM: 2 INJECTION INTRAMUSCULAR; INTRAVENOUS at 16:30

## 2018-11-23 RX ADMIN — RIVAROXABAN 20 MG: 20 TABLET, FILM COATED ORAL at 18:29

## 2018-11-23 RX ADMIN — LISINOPRIL 5 MG: 5 TABLET ORAL at 05:33

## 2018-11-23 RX ADMIN — TRIAMCINOLONE ACETONIDE: 0.25 CREAM TOPICAL at 18:29

## 2018-11-23 RX ADMIN — STANDARDIZED SENNA CONCENTRATE AND DOCUSATE SODIUM 2 TABLET: 8.6; 5 TABLET, FILM COATED ORAL at 18:29

## 2018-11-23 RX ADMIN — PRAVASTATIN SODIUM 20 MG: 20 TABLET ORAL at 18:29

## 2018-11-23 RX ADMIN — LORAZEPAM: 2 INJECTION INTRAMUSCULAR; INTRAVENOUS at 08:00

## 2018-11-23 RX ADMIN — ACETAMINOPHEN 650 MG: 325 TABLET, FILM COATED ORAL at 02:25

## 2018-11-23 RX ADMIN — AMIODARONE HYDROCHLORIDE 400 MG: 200 TABLET ORAL at 05:34

## 2018-11-23 RX ADMIN — NYSTATIN: 100000 POWDER TOPICAL at 05:34

## 2018-11-23 RX ADMIN — SPIRONOLACTONE 25 MG: 25 TABLET ORAL at 05:34

## 2018-11-23 RX ADMIN — TRIAMCINOLONE ACETONIDE: 0.25 CREAM TOPICAL at 12:05

## 2018-11-23 RX ADMIN — POTASSIUM CHLORIDE 20 MEQ: 1500 TABLET, EXTENDED RELEASE ORAL at 05:34

## 2018-11-23 RX ADMIN — LORAZEPAM: 2 INJECTION INTRAMUSCULAR; INTRAVENOUS at 07:45

## 2018-11-23 ASSESSMENT — ENCOUNTER SYMPTOMS
APNEA: 0
CHOKING: 0
STRIDOR: 0
WEAKNESS: 1
HEADACHES: 1
DIZZINESS: 0
LOSS OF CONSCIOUSNESS: 1
WHEEZING: 0
SHORTNESS OF BREATH: 0
COUGH: 0
TREMORS: 1
CHEST TIGHTNESS: 0

## 2018-11-23 ASSESSMENT — PAIN SCALES - GENERAL
PAINLEVEL_OUTOF10: 0
PAINLEVEL_OUTOF10: 4

## 2018-11-23 NOTE — PROGRESS NOTES
Pt experienced fall in room.   Pt up to bathroom without calling. Urinating in toilet. Felt weak when walking back to bed but wanted to make it back to bed by himself. Came out of bathroom and fell onto ground. Found by ELISA Mcghee. Called for help. Staff assist button pushed. Crash cart brought into room. 4mg Ativan given for seizure-like symptoms. Dr. Patel at bedside. New orders in place.

## 2018-11-23 NOTE — PROGRESS NOTES
Cardiology Follow Up Progress Note    Date of Service  11/23/2018    Attending Physician  Sathish Patel M.D.    Reason for consultation   Dyspnea, volume overload      History of     Atrial fibrillation, hypertension, dyslipidemia, type 2 diabetes    Admitted for   Dyspnea, leg swelling, 40 pounds weight gain      Interim Events    11/23/18, low potassium, K3.5, negative troponin,  in the setting of high BMI    11/22/18, echo revealed LVEF 60%, indeterminate diastolic function    Review of Systems  Review of Systems   Respiratory: Negative for apnea, cough, choking, chest tightness, shortness of breath, wheezing and stridor.    Cardiovascular: Positive for leg swelling. Negative for chest pain.       Vital signs in last 24 hours  Temp:  [36.3 °C (97.3 °F)-36.7 °C (98.1 °F)] 36.3 °C (97.4 °F)  Pulse:  [61-82] 77  Resp:  [14-20] 16  BP: ()/(45-91) 110/62    Physical Exam  Physical Exam   Constitutional: He is oriented to person, place, and time.   HENT:   Head: Normocephalic.   Eyes: Conjunctivae are normal.   Neck: No thyromegaly present.   Cardiovascular: Normal rate.  An irregularly irregular rhythm present.   Pulses:       Carotid pulses are 2+ on the right side, and 2+ on the left side.       Radial pulses are 2+ on the right side, and 2+ on the left side.   Pulmonary/Chest: He has no wheezes.   Abdominal: Soft.   Musculoskeletal: He exhibits edema.   Neurological: He is alert and oriented to person, place, and time.   Skin: Skin is warm and dry.       Lab Review  Lab Results   Component Value Date/Time    WBC 4.7 (L) 11/23/2018 01:59 AM    RBC 4.12 (L) 11/23/2018 01:59 AM    HEMOGLOBIN 11.5 (L) 11/23/2018 01:59 AM    HEMATOCRIT 36.6 (L) 11/23/2018 01:59 AM    MCV 88.8 11/23/2018 01:59 AM    MCH 27.9 11/23/2018 01:59 AM    MCHC 31.4 (L) 11/23/2018 01:59 AM    MPV 11.0 11/23/2018 01:59 AM      Lab Results   Component Value Date/Time    SODIUM 137 11/23/2018 01:59 AM    POTASSIUM 3.5 (L) 11/23/2018  01:59 AM    CHLORIDE 98 11/23/2018 01:59 AM    CO2 31 11/23/2018 01:59 AM    GLUCOSE 129 (H) 11/23/2018 01:59 AM    BUN 28 (H) 11/23/2018 01:59 AM    CREATININE 1.03 11/23/2018 01:59 AM      Lab Results   Component Value Date/Time    ASTSGOT 23 11/21/2018 08:36 AM    ALTSGPT 19 11/21/2018 08:36 AM     Lab Results   Component Value Date/Time    CHOLSTRLTOT 104 11/22/2018 12:07 AM    LDL 60 11/22/2018 12:07 AM    HDL 27 (A) 11/22/2018 12:07 AM    TRIGLYCERIDE 87 11/22/2018 12:07 AM    TROPONINI <0.01 11/21/2018 06:22 PM       Recent Labs      11/21/18   0836   BNPBTYPENAT  117*         Assessment    HFpEF  LVEF 60%  Obesity, BMI 38  Atrial fibrillation, chronic  On chronic anticoagulation with xarelto 20 mg  Hypertension  Hyperlipidemia      Plan  Chronic A. Fib, rate controlled   Continue with amiodarone 400 mg TID  Continue with oral anticoagulation with Xarelto  Acute on chronic diastolic heart failure   Continue with torsemide 40 mg daily  I/O =-8300 since admit  We will request standing weight today  Plan for DCCV when euvolemic

## 2018-11-23 NOTE — DIETARY
Nutrition Services:    Poor PO intake on Nutrition Admit Screen. Pt is currently on a cardiac diet and per chart pt PO % x 4 meals.  Pt is eating well.   Ht: 182.9 cm, Wt: 129 kg, BMI 38.56 (Obese Class II).  Consult RD as needed. RD will re-screen weekly.      RD available prn

## 2018-11-23 NOTE — PROGRESS NOTES
Assumed care at 1900, bedside report received from day shift RN. Pt on the monitor. Orders reviewed, call light within reach, and hourly rounding in place. POC addressed with patient, no additional questions at this time.

## 2018-11-23 NOTE — CARE PLAN
Problem: Communication  Goal: The ability to communicate needs accurately and effectively will improve  Outcome: PROGRESSING AS EXPECTED    Intervention: [place] patient and significant other/support system to call light to alert staff of needs  Patient has been oriented to call light system and has been able to communicate his needs accurately and effectively through out the shift.      Problem: Infection  Goal: Will remain free from infection  Outcome: PROGRESSING AS EXPECTED    Intervention: Assess signs and symptoms of infection  Patient has been assessed for signs and symptoms of infection, patient has remained free from any new signs or symptoms of infections during shift.

## 2018-11-23 NOTE — CARE PLAN
Problem: Safety  Goal: Will remain free from injury  Outcome: PROGRESSING SLOWER THAN EXPECTED  Bed locked and lowest position. Bed alarm on. Treaded socks on. Call light and belongings within reach. Pt educated to call for assistance. Pt verbalized understanding. Hourly rounding in place.     Problem: Knowledge Deficit  Goal: Knowledge of disease process/condition, treatment plan, diagnostic tests, and medications will improve  Outcome: PROGRESSING AS EXPECTED  Discussed POC and medications with pt, pt verbalized understanding.

## 2018-11-23 NOTE — PROGRESS NOTES
2 RN skin check with Juan M RN:    Scabbing upper right hair line  Scattered scabbing on left forearm  Elbows pink and blanching  Pannus red, moist and blanching  Bruising to right hip  Sacrum red and blanching  Groin red moist and blanching  Bilateral behind the knees red and blanching  Cellulitis present bilateral on lower extremities  Legs red and dry  Big toe amputation on the right foot  DTI on the bottom of left foot  Blister present on left foot.

## 2018-11-23 NOTE — PROGRESS NOTES
Hospital Medicine Daily Progress Note    Date of Service  11/22/2018    Chief Complaint  53 y.o. male admitted 11/21/2018 with acute on chronic sCHF exacerbation    Hospital Course  See below    Interval Problem Update  sCHF-diuresed 6 liters in the last 24 hours, feels somewhat better, but still with massive leg swelling and pain. TELE showed A FIb with controlled HR.    REPEAT ECHO  Prior echo done 07/09/18, EF now normal.  Quality improved with contrast.  Left ventricular ejection fraction is visually estimated to be 60%.  Mild concentric left ventricular hypertrophy.  Mild mitral annular calcifcation, mild MR.  Mild tricuspid regurgitation.  Estimated right ventricular systolic pressure  is 30 mmHg.    A fib-HR well controlled.    Consultants/Specialty  cards    Code Status  FCfC    Disposition  tele    Review of Systems  Review of Systems   Respiratory: Negative for shortness of breath.    Cardiovascular: Positive for leg swelling. Negative for chest pain.   Neurological: Positive for weakness.   All other systems reviewed and are negative.       Physical Exam  Temp:  [36.4 °C (97.6 °F)-36.9 °C (98.5 °F)] 36.6 °C (97.8 °F)  Pulse:  [65-83] 69  Resp:  [14-18] 14  BP: ()/(43-76) 101/59    Physical Exam   Constitutional: He is oriented to person, place, and time. He appears well-developed and well-nourished. No distress.   HENT:   Head: Normocephalic and atraumatic.   Mouth/Throat: No oropharyngeal exudate.   Eyes: Pupils are equal, round, and reactive to light. No scleral icterus.   Neck: Normal range of motion. Neck supple. No thyromegaly present.   Cardiovascular: Normal rate, normal heart sounds and intact distal pulses.    No murmur heard.  Irregular irregular   Pulmonary/Chest: Effort normal. No respiratory distress. He has no wheezes. He has rales.   Abdominal: Soft. Bowel sounds are normal. He exhibits no distension. There is no tenderness.   Obese   Musculoskeletal: Normal range of motion. He  exhibits edema (4+ pitting edema B/L LE's, warm peripherally). He exhibits no tenderness.   Neurological: He is alert and oriented to person, place, and time. No cranial nerve deficit.   Skin: Skin is warm and dry. No rash noted.   Redundant skin   Psychiatric: He has a normal mood and affect.   Nursing note and vitals reviewed.      Fluids    Intake/Output Summary (Last 24 hours) at 11/22/18 1614  Last data filed at 11/22/18 1245   Gross per 24 hour   Intake              720 ml   Output             4070 ml   Net            -3350 ml       Laboratory  Recent Labs      11/21/18   0836  11/22/18   0008   WBC  5.1  5.1   RBC  4.04*  4.03*   HEMOGLOBIN  11.4*  11.6*   HEMATOCRIT  36.3*  35.7*   MCV  89.9  88.6   MCH  28.2  28.8   MCHC  31.4*  32.5*   RDW  46.2  44.9   PLATELETCT  180  170   MPV  10.2  11.0     Recent Labs      11/20/18   1101  11/21/18   0836  11/22/18   0007   SODIUM  137  137  138   POTASSIUM  3.6  4.0  3.6   CHLORIDE  101  104  100   CO2  28  26  30   GLUCOSE  116*  102*  99   BUN  28*  30*  29*   CREATININE  0.86  0.96  1.01   CALCIUM  9.0  8.9  8.9     Recent Labs      11/21/18   0836   APTT  43.5*   INR  2.12*     Recent Labs      11/21/18   0836   BNPBTYPENAT  117*     Recent Labs      11/22/18   0007   TRIGLYCERIDE  87   HDL  27*   LDL  60       Imaging  EC-ECHOCARDIOGRAM COMPLETE W/ CONT   Final Result      EC-ECHOCARDIOGRAM COMPLETE W/O CONT         DX-CHEST-PORTABLE (1 VIEW)   Final Result      1.  Hypoinflation without other evidence for acute cardiopulmonary disease.   2.  Stable cardiomegaly.           Assessment/Plan  Acute on chronic diastolic heart failure (HCC)- (present on admission)   Assessment & Plan    -ECHO with EF 60%, most valves appear ok  -given diastolic dysfunction and A fib, likely an element of lost atrial kick  -attempt to chemically cardiovert with amio  -switch to oral torsemide 100 mg daily  -monitor lytes closely, free fluid restriction  -cards consult  -monitor on  tele     Class 2 obesity due to excess calories without serious comorbidity with body mass index (BMI) of 37.0 to 37.9 in adult- (present on admission)   Assessment & Plan    Patient will need outpatient weight loss management program.  Body mass index is 37.97 kg/m².       Paroxysmal A-fib (HCC)- (present on admission)   Assessment & Plan    Continue at this point with rate control and anticoagulation.  -loading with amiodarone for possible chemical cardioversion     Essential hypertension- (present on admission)   Assessment & Plan    Continue with blood pressure management optimization keep systolic blood pressure under 140 diastolic under 90.     Mixed hyperlipidemia- (present on admission)   Assessment & Plan    Low-fat low-cholesterol diet.  Continue with statin.  Lab Results   Component Value Date/Time    CHOLSTRLTOT 119 07/10/2018 02:30 AM    LDL 68 07/10/2018 02:30 AM    HDL 24 (A) 07/10/2018 02:30 AM    TRIGLYCERIDE 136 07/10/2018 02:30 AM               GERD (gastroesophageal reflux disease)- (present on admission)   Assessment & Plan    Continue with proton pump inhibitor.  Currently patient is not having heartburn.      Type 2 diabetes mellitus, without long-term current use of insulin, with peripheral neuropathy (HCC)- (present on admission)   Assessment & Plan    -accus with sliding scale coverage, sugars remain well controlled right now  -diabetic diet  -diabetic education  -continue with oral antihyperglycemics  -monitor for hypoglycemic episodes and adjust control if he should get low          VTE prophylaxis: xarelto

## 2018-11-24 PROBLEM — R56.9 SEIZURE-LIKE ACTIVITY (HCC): Status: ACTIVE | Noted: 2018-11-24

## 2018-11-24 LAB
ANION GAP SERPL CALC-SCNC: 8 MMOL/L (ref 0–11.9)
BASOPHILS # BLD AUTO: 0.7 % (ref 0–1.8)
BASOPHILS # BLD: 0.05 K/UL (ref 0–0.12)
BUN SERPL-MCNC: 30 MG/DL (ref 8–22)
CALCIUM SERPL-MCNC: 9 MG/DL (ref 8.5–10.5)
CHLORIDE SERPL-SCNC: 95 MMOL/L (ref 96–112)
CO2 SERPL-SCNC: 34 MMOL/L (ref 20–33)
CREAT SERPL-MCNC: 1.14 MG/DL (ref 0.5–1.4)
EKG IMPRESSION: NORMAL
EKG IMPRESSION: NORMAL
EOSINOPHIL # BLD AUTO: 0.22 K/UL (ref 0–0.51)
EOSINOPHIL NFR BLD: 2.9 % (ref 0–6.9)
ERYTHROCYTE [DISTWIDTH] IN BLOOD BY AUTOMATED COUNT: 44 FL (ref 35.9–50)
GLUCOSE SERPL-MCNC: 106 MG/DL (ref 65–99)
HCT VFR BLD AUTO: 39.4 % (ref 42–52)
HGB BLD-MCNC: 12.6 G/DL (ref 14–18)
IMM GRANULOCYTES # BLD AUTO: 0.02 K/UL (ref 0–0.11)
IMM GRANULOCYTES NFR BLD AUTO: 0.3 % (ref 0–0.9)
LYMPHOCYTES # BLD AUTO: 2.35 K/UL (ref 1–4.8)
LYMPHOCYTES NFR BLD: 31.4 % (ref 22–41)
MAGNESIUM SERPL-MCNC: 2.1 MG/DL (ref 1.5–2.5)
MCH RBC QN AUTO: 28.1 PG (ref 27–33)
MCHC RBC AUTO-ENTMCNC: 32 G/DL (ref 33.7–35.3)
MCV RBC AUTO: 87.9 FL (ref 81.4–97.8)
MONOCYTES # BLD AUTO: 0.49 K/UL (ref 0–0.85)
MONOCYTES NFR BLD AUTO: 6.5 % (ref 0–13.4)
NEUTROPHILS # BLD AUTO: 4.36 K/UL (ref 1.82–7.42)
NEUTROPHILS NFR BLD: 58.2 % (ref 44–72)
NRBC # BLD AUTO: 0 K/UL
NRBC BLD-RTO: 0 /100 WBC
PLATELET # BLD AUTO: 194 K/UL (ref 164–446)
PMV BLD AUTO: 10.5 FL (ref 9–12.9)
POTASSIUM SERPL-SCNC: 3.9 MMOL/L (ref 3.6–5.5)
RBC # BLD AUTO: 4.48 M/UL (ref 4.7–6.1)
SODIUM SERPL-SCNC: 137 MMOL/L (ref 135–145)
WBC # BLD AUTO: 7.5 K/UL (ref 4.8–10.8)

## 2018-11-24 PROCEDURE — 700102 HCHG RX REV CODE 250 W/ 637 OVERRIDE(OP): Performed by: INTERNAL MEDICINE

## 2018-11-24 PROCEDURE — 99233 SBSQ HOSP IP/OBS HIGH 50: CPT | Performed by: INTERNAL MEDICINE

## 2018-11-24 PROCEDURE — 99232 SBSQ HOSP IP/OBS MODERATE 35: CPT | Performed by: INTERNAL MEDICINE

## 2018-11-24 PROCEDURE — A9270 NON-COVERED ITEM OR SERVICE: HCPCS | Performed by: INTERNAL MEDICINE

## 2018-11-24 PROCEDURE — 770020 HCHG ROOM/CARE - TELE (206)

## 2018-11-24 PROCEDURE — 83735 ASSAY OF MAGNESIUM: CPT

## 2018-11-24 PROCEDURE — 85025 COMPLETE CBC W/AUTO DIFF WBC: CPT

## 2018-11-24 PROCEDURE — A9270 NON-COVERED ITEM OR SERVICE: HCPCS | Performed by: HOSPITALIST

## 2018-11-24 PROCEDURE — 700102 HCHG RX REV CODE 250 W/ 637 OVERRIDE(OP): Performed by: HOSPITALIST

## 2018-11-24 PROCEDURE — 36415 COLL VENOUS BLD VENIPUNCTURE: CPT

## 2018-11-24 PROCEDURE — 80048 BASIC METABOLIC PNL TOTAL CA: CPT

## 2018-11-24 RX ADMIN — LISINOPRIL 5 MG: 5 TABLET ORAL at 06:26

## 2018-11-24 RX ADMIN — PRAVASTATIN SODIUM 20 MG: 20 TABLET ORAL at 17:22

## 2018-11-24 RX ADMIN — TRIAMCINOLONE ACETONIDE: 0.25 CREAM TOPICAL at 18:00

## 2018-11-24 RX ADMIN — POTASSIUM CHLORIDE 20 MEQ: 1500 TABLET, EXTENDED RELEASE ORAL at 06:26

## 2018-11-24 RX ADMIN — TRIAMCINOLONE ACETONIDE: 0.25 CREAM TOPICAL at 06:27

## 2018-11-24 RX ADMIN — ACETAMINOPHEN 650 MG: 325 TABLET, FILM COATED ORAL at 21:55

## 2018-11-24 RX ADMIN — NYSTATIN: 100000 POWDER TOPICAL at 11:51

## 2018-11-24 RX ADMIN — TRIAMCINOLONE ACETONIDE: 0.25 CREAM TOPICAL at 12:00

## 2018-11-24 RX ADMIN — TAMSULOSIN HYDROCHLORIDE 0.4 MG: 0.4 CAPSULE ORAL at 08:07

## 2018-11-24 RX ADMIN — SPIRONOLACTONE 25 MG: 25 TABLET ORAL at 06:26

## 2018-11-24 RX ADMIN — RIVAROXABAN 20 MG: 20 TABLET, FILM COATED ORAL at 17:22

## 2018-11-24 RX ADMIN — AMIODARONE HYDROCHLORIDE 400 MG: 200 TABLET ORAL at 11:50

## 2018-11-24 RX ADMIN — AMIODARONE HYDROCHLORIDE 400 MG: 200 TABLET ORAL at 17:22

## 2018-11-24 RX ADMIN — TORSEMIDE 40 MG: 20 TABLET ORAL at 06:26

## 2018-11-24 RX ADMIN — NYSTATIN: 100000 POWDER TOPICAL at 17:23

## 2018-11-24 RX ADMIN — AMIODARONE HYDROCHLORIDE 400 MG: 200 TABLET ORAL at 06:26

## 2018-11-24 RX ADMIN — NYSTATIN: 100000 POWDER TOPICAL at 06:28

## 2018-11-24 ASSESSMENT — ENCOUNTER SYMPTOMS
CHEST TIGHTNESS: 0
ABDOMINAL PAIN: 0
WEAKNESS: 1
LOSS OF CONSCIOUSNESS: 0
VOMITING: 0
DIZZINESS: 0
TREMORS: 1
APNEA: 0
STRIDOR: 0
COUGH: 0
SHORTNESS OF BREATH: 0
NAUSEA: 0
HEADACHES: 1
WHEEZING: 0
FEVER: 0
CHOKING: 0

## 2018-11-24 ASSESSMENT — PAIN SCALES - GENERAL
PAINLEVEL_OUTOF10: 0

## 2018-11-24 NOTE — CONSULTS
Hospital Neurology Consult:    Referring Physician: Dr. Sathish Patel     Reason for consultation: Possible seizure    HPI: Sebastián Castro is a 53 y.o. male with history of atrial fibrillation currently admitted with new onset heart failure, diabetes and diabetic neuropathy, hyperlipidemia, hypertension, obesity and consulted for possible seizure.  The patient is currently hospitalized for management of congestive heart failure and is currently approximately 8 L negative.  On the morning of 11/22/2018 the patient was found down on the floor with generalized shaking.  At the time, he had gotten up to use the bathroom prior to the event beginning.  The event lasted approximately 5 minutes at which time he received around 6 mg of IV Ativan with cessation of movements.  At approximately 4:30 PM the patient had a similar event while lying in bed which lasted approximately 2 minutes and he was given 2 mg of IV Ativan.  Neurology was consulted for question of new onset seizures.  During my evaluation, I witnessed an event during which the patient had asymmetric shaking of predominantly the upper extremities however the lower extremities were also minimally involved.  The patient had closed eyes and when forcefully opened, the patient moved his eyes away to avoid the light.  Additionally, he had shaking of the left upper extremity mostly involving the hand.  When the patient's right arm was picked up and held over his head, he retained the posterior and it did not fall.  Additionally, when the arm was lifted, he held it in that position while the shaking continued.  He was unable to follow any commands at that time.  There were no changes in vital signs such as increase in heart rate or decrease in oxygen saturation.    ROS: Unable to obtain as the patient was nonverbal for our interaction     Past Medical History:    has a past medical history of A-fib (HCC); Chest pain; Congestive heart failure (HCC); Diabetes (HCC);  Diabetic neuropathy (HCC); Hypercholesterolemia; Hypertension; Morbid obesity (HCC); Noncompliance; and Normal cardiac stress test.    FHx:  family history includes No Known Problems in his father and mother.    SHx:   reports that he has never smoked. He has never used smokeless tobacco. He reports that he does not drink alcohol or use drugs.    Allergies:  Allergies   Allergen Reactions   • Daptomycin Rash     Body rash  RXN=1/2018     • Pcn [Penicillins] Hives and Rash      Rash & hives  RXN=since a kid   • Unasyn [Ampicillin-Sulbactam Sodium] Hives, Itching and Swelling   • Vancomycin Rash     Red man syndrome       Medications:    Current Facility-Administered Medications:   •  [START ON 11/24/2018] torsemide (DEMADEX) tablet 40 mg, 40 mg, Oral, Q DAY, Sathish Patel M.D.  •  LORAZEPAM 2 MG/ML INJ SOLN, , , ,   •  levETIRAcetam (KEPPRA) 1,000 mg in  mL IVPB, 1,000 mg, Intravenous, Q12HRS, Sathish Patel M.D.  •  amiodarone (CORDARONE) tablet 400 mg, 400 mg, Oral, TID, Sirena Larson M.D., 400 mg at 11/23/18 1203  •  lisinopril (PRINIVIL) tablet 5 mg, 5 mg, Oral, DAILY, Sandra Mccann M.D., 5 mg at 11/23/18 0533  •  pravastatin (PRAVACHOL) tablet 20 mg, 20 mg, Oral, Q EVENING, Sandra Mccann M.D., 20 mg at 11/22/18 1656  •  rivaroxaban (XARELTO) tablet 20 mg, 20 mg, Oral, PM MEAL, Sandra Mccann M.D., 20 mg at 11/22/18 1657  •  spironolactone (ALDACTONE) tablet 25 mg, 25 mg, Oral, DAILY, Sandra Mccann M.D., 25 mg at 11/23/18 0534  •  tamsulosin (FLOMAX) capsule 0.4 mg, 0.4 mg, Oral, AFTER BREAKFAST, Sandra Mccann M.D., 0.4 mg at 11/23/18 0924  •  triamcinolone acetonide (KENALOG) 0.025 % cream, , Topical, TID, Sandra Mccann M.D.  •  senna-docusate (PERICOLACE or SENOKOT S) 8.6-50 MG per tablet 2 Tab, 2 Tab, Oral, BID **AND** polyethylene glycol/lytes (MIRALAX) PACKET 1 Packet, 1 Packet, Oral, QDAY PRN **AND** magnesium hydroxide (MILK OF MAGNESIA) suspension 30 mL, 30 mL, Oral, QDAY PRN  **AND** bisacodyl (DULCOLAX) suppository 10 mg, 10 mg, Rectal, QDAY PRN, Sandra Mccann M.D.  •  acetaminophen (TYLENOL) tablet 650 mg, 650 mg, Oral, Q6HRS PRN, Sandra Mccann M.D., 650 mg at 11/23/18 0225  •  labetalol (NORMODYNE,TRANDATE) injection 10 mg, 10 mg, Intravenous, Q4HRS PRN, Sandra Mccann M.D.  •  cloNIDine (CATAPRES) tablet 0.1 mg, 0.1 mg, Oral, Q6HRS PRN, Sandra Mccann M.D.  •  potassium chloride SA (Kdur) tablet 20 mEq, 20 mEq, Oral, DAILY, Sandra Mccann M.D., 20 mEq at 11/23/18 0573  •  ondansetron (ZOFRAN) syringe/vial injection 4 mg, 4 mg, Intravenous, Q4HRS PRN, Sandra Mcacnn M.D.  •  ondansetron (ZOFRAN ODT) dispertab 4 mg, 4 mg, Oral, Q4HRS PRN, Sandra Mccann M.D.  •  promethazine (PHENERGAN) tablet 12.5-25 mg, 12.5-25 mg, Oral, Q4HRS PRN, Sandra Mccann M.D.  •  promethazine (PHENERGAN) suppository 12.5-25 mg, 12.5-25 mg, Rectal, Q4HRS PRN, Sandra Mccann M.D.  •  prochlorperazine (COMPAZINE) injection 5-10 mg, 5-10 mg, Intravenous, Q4HRS PRN, Sandra Mccann M.D.  •  nystatin (MYCOSTATIN) powder, , Topical, TID, Sandra Mccann M.D.    Vitals:   Vitals:    11/23/18 0752 11/23/18 0755 11/23/18 1002 11/23/18 1323   BP: 125/91 127/87 110/62 108/65   Pulse: 82  77 85   Resp: 20  16 16   Temp:   36.3 °C (97.4 °F) 36.4 °C (97.6 °F)   TempSrc:   Temporal Temporal   SpO2: 99%  97% 96%   Weight:       Height:           Labs:  Lab Results   Component Value Date/Time    PROTHROMBTM 23.8 (H) 11/21/2018 08:36 AM    INR 2.12 (H) 11/21/2018 08:36 AM      Lab Results   Component Value Date/Time    WBC 4.7 (L) 11/23/2018 01:59 AM    RBC 4.12 (L) 11/23/2018 01:59 AM    HEMOGLOBIN 11.5 (L) 11/23/2018 01:59 AM    HEMATOCRIT 36.6 (L) 11/23/2018 01:59 AM    MCV 88.8 11/23/2018 01:59 AM    MCH 27.9 11/23/2018 01:59 AM    MCHC 31.4 (L) 11/23/2018 01:59 AM    MPV 11.0 11/23/2018 01:59 AM    NEUTSPOLYS 40.20 (L) 11/23/2018 01:59 AM    LYMPHOCYTES 46.10 (H) 11/23/2018 01:59 AM    MONOCYTES 8.80 11/23/2018 01:59 AM     EOSINOPHILS 4.30 11/23/2018 01:59 AM    BASOPHILS 0.60 11/23/2018 01:59 AM      Lab Results   Component Value Date/Time    SODIUM 137 11/23/2018 01:59 AM    POTASSIUM 3.5 (L) 11/23/2018 01:59 AM    CHLORIDE 98 11/23/2018 01:59 AM    CO2 31 11/23/2018 01:59 AM    GLUCOSE 129 (H) 11/23/2018 01:59 AM    BUN 28 (H) 11/23/2018 01:59 AM    CREATININE 1.03 11/23/2018 01:59 AM      Lab Results   Component Value Date/Time    CHOLSTRLTOT 104 11/22/2018 12:07 AM    LDL 60 11/22/2018 12:07 AM    HDL 27 (A) 11/22/2018 12:07 AM    TRIGLYCERIDE 87 11/22/2018 12:07 AM       Lab Results   Component Value Date/Time    ALKPHOSPHAT 90 11/21/2018 08:36 AM    ASTSGOT 23 11/21/2018 08:36 AM    ALTSGPT 19 11/21/2018 08:36 AM    TBILIRUBIN 0.7 11/21/2018 08:36 AM      No results found for: TSH  Lab Results   Component Value Date/Time    FREET4 1.34 07/19/2018 10:13 AM     Imaging:  CT head without contrast on 11/23/2018 personally reviewed and was within normal limits.    Physical Exam:     General: 53-year-old male lying in bed with oxygen cannula placed in no acute distress.  Appears somnolent  Skin: Warm, dry.  Presence of erythema on the bilateral lower extremities with pitting edema  HEENT: Moist oral mucosa.  Normal conjunctiva and sclera.  Atraumatic head.    Neurologic:  Mental Status: Somnolent.  Unable to assess mental status secondary to patient having a event  Cranial Nerves:  PERRL. EOMi. Face symmetric unable to evaluate palatoglossal movements or visual fields Motor: Moves all 4 extremities spontaneously  Reflexes: Flexor plantar responses bilaterally.  Coordination: Unable to assess  Sensation: Deferred  Gait/Station: Unable to assess/deferred    Assessment/Plan:    Sebastián Castro is a 53 y.o. male with history of atrial fibrillation currently admitted with new onset heart failure, diabetes and diabetic neuropathy, hyperlipidemia, hypertension, obesity and consulted for possible seizure.  Fortunately during my examination  I was in is an event which is detailed above.  Overall, event is consistent with nonepileptic events given that the patient demonstrated some aspects of volitional behavior during the event such as holding his arm up, and forcefully withdrawing eyes away from the examiner when they were opened.  Additionally, the asymmetric and arrhythmic nature of the movements as well as start-stop nature of the event is suggestive of a nonepileptic etiology.  At this time, I would recommend against further Ativan and would recommend against antiseizure drug administration.    Plan:  1.  Obtain EEG in the morning  2.  If EEG is normal, consult psychiatry for nonepileptic events  3.  Would avoid IV Ativan dosing unless events become intractable or long lasting and this would be mostly to treat the event itself, at this time very low suspicion for epileptic etiology  4.  No antiseizure drugs at this time  5.  Neurology signing off if EEG is normal.  If abnormal EEG, please call for further recommendations.      Josh Shin M.D., Diplomat of the American Board of Psychiatry and Neurology  [facility], [place], operated by [facility]

## 2018-11-24 NOTE — PROGRESS NOTES
2 RN skin check completed with Alissa PÉREZ    Scab upper right forehead.  Scattered scabs left forearm.  Bilateral elbows pink, blanching  Pannus red, moist, blanching. Interdrys in place, nystatin powder applied.  Bruise right hip.   Sacrum red, blanching. Meplex applied.  Groin red, moist, blanching.   Cellulitis present bilateral on lower extremities.   Legs red and dry.   Big toe amputation on the right foot  DTI on the bottom of left foot  Blister present on left foot. Cleansed. Dressing changed.

## 2018-11-24 NOTE — PROGRESS NOTES
RN spoke to MD Patel about possible seizures. MD asked RN not to give any ativan at this time. RN will continue to monitor pt and notify MD of any changes.

## 2018-11-24 NOTE — PROGRESS NOTES
Cardiology Follow Up Progress Note    Date of Service  11/24/2018    Attending Physician  Sathish Patel M.D.    Reason for consultation   Dyspnea, volume overload      History of     Atrial fibrillation, hypertension, dyslipidemia, type 2 diabetes    Admitted for   Dyspnea, leg swelling, 40 pounds weight gain      Interim Events  11/24/18, afib, rate controlled, diuresed well overnight, low suspicious for seizure activities, EEG pending   11/23/18, low potassium, K3.5, negative troponin,  in the setting of high BMI    11/22/18, echo revealed LVEF 60%, indeterminate diastolic function    Review of Systems  Review of Systems   Respiratory: Negative for apnea, cough, choking, chest tightness, shortness of breath, wheezing and stridor.    Cardiovascular: Positive for leg swelling. Negative for chest pain.       Vital signs in last 24 hours  Temp:  [36.4 °C (97.6 °F)-37.1 °C (98.7 °F)] 37.1 °C (98.7 °F)  Pulse:  [66-89] 78  Resp:  [16-20] 20  BP: (108-135)/(65-90) 131/80    Physical Exam  Physical Exam   Constitutional: He is oriented to person, place, and time.   HENT:   Head: Normocephalic.   Eyes: Conjunctivae are normal.   Neck: No thyromegaly present.   Cardiovascular: Normal rate.  An irregularly irregular rhythm present.   Pulses:       Carotid pulses are 2+ on the right side, and 2+ on the left side.       Radial pulses are 2+ on the right side, and 2+ on the left side.   Pulmonary/Chest: He has no wheezes.   Abdominal: Soft.   Musculoskeletal: He exhibits edema.   Neurological: He is alert and oriented to person, place, and time.   Skin: Skin is warm and dry.       Lab Review  Lab Results   Component Value Date/Time    WBC 7.5 11/24/2018 03:02 AM    RBC 4.48 (L) 11/24/2018 03:02 AM    HEMOGLOBIN 12.6 (L) 11/24/2018 03:02 AM    HEMATOCRIT 39.4 (L) 11/24/2018 03:02 AM    MCV 87.9 11/24/2018 03:02 AM    MCH 28.1 11/24/2018 03:02 AM    MCHC 32.0 (L) 11/24/2018 03:02 AM    MPV 10.5 11/24/2018 03:02 AM       Lab Results   Component Value Date/Time    SODIUM 137 11/24/2018 03:02 AM    POTASSIUM 3.9 11/24/2018 03:02 AM    CHLORIDE 95 (L) 11/24/2018 03:02 AM    CO2 34 (H) 11/24/2018 03:02 AM    GLUCOSE 106 (H) 11/24/2018 03:02 AM    BUN 30 (H) 11/24/2018 03:02 AM    CREATININE 1.14 11/24/2018 03:02 AM      Lab Results   Component Value Date/Time    ASTSGOT 23 11/21/2018 08:36 AM    ALTSGPT 19 11/21/2018 08:36 AM     Lab Results   Component Value Date/Time    CHOLSTRLTOT 104 11/22/2018 12:07 AM    LDL 60 11/22/2018 12:07 AM    HDL 27 (A) 11/22/2018 12:07 AM    TRIGLYCERIDE 87 11/22/2018 12:07 AM    TROPONINI <0.01 11/21/2018 06:22 PM               Assessment    HFpEF  LVEF 60%  Obesity, BMI 38  Atrial fibrillation, chronic  On chronic anticoagulation with xarelto 20 mg  Hypertension  Hyperlipidemia      Plan  Low suspicious for seizure activities, EEG pending, appreciate neuro, may need psych consult.  Chronic A. Fib, rate controlled   Continue with amiodarone 400 mg TID  Continue with oral anticoagulation with Xarelto  Acute on chronic diastolic heart failure   Continue with torsemide 40 mg daily  I/O =-1100 since admit  We will request standing weight today  Plan for DCCV when euvolemic, likely in am if neuro stable , for now NPO after MN

## 2018-11-24 NOTE — ASSESSMENT & PLAN NOTE
Discussed with neurology again, does not appear to be orthostatic tremors as he's not orthostatic during the episodes today. No evidence that they are epileptic in nature.  EEG from 12/5 negative for epileptiform discharges during his episodes. NO ATIVAN  - neurology evaluated (signed off)  - s/p EEG on 11/26 and 12/5 negative  - psych following, greatly appreciate  - Started on Prozac and given community resources for PTSD  - monitor clinically

## 2018-11-24 NOTE — PROGRESS NOTES
2 RN skin check with Melisa :     Scabbing upper right hair line  Scattered scabbing on left forearm  Elbows pink and blanching  Pannus red, moist and blanching  Bruising to right hip  Sacrum red and blanching  Groin red moist and blanching  Bilateral behind the knees red and blanching  Cellulitis present bilateral on lower extremities  Legs red and dry  Big toe amputation on the right foot  DTI on the bottom of left foot  Blister present on left foot.

## 2018-11-24 NOTE — PROGRESS NOTES
Assumed care at 1900, bedside report received from day shift RN. Pt on the monitor. Initial assessment completed, orders reviewed, call light within reach, bed alarm in use, and hourly rounding in place. POC addressed with patient, no additional questions at this time.

## 2018-11-24 NOTE — CARE PLAN
Problem: Communication  Goal: The ability to communicate needs accurately and effectively will improve  Outcome: PROGRESSING AS EXPECTED  Pt able to communicate his needs accurately and effectively. RN rounding hourly to make sure needs are met.    Problem: Knowledge Deficit  Goal: Knowledge of disease process/condition, treatment plan, diagnostic tests, and medications will improve  Outcome: PROGRESSING AS EXPECTED  Patient educated on his condition, treatment plan (EEG and Cardioversion), diagnostic tests and medications. He verbalized understanding. RN will continue to educate patient and involve him in his care.

## 2018-11-24 NOTE — PROGRESS NOTES
Hospital Medicine Daily Progress Note    Date of Service  11/24/2018    Chief Complaint  53 y.o. male admitted 11/21/2018 with acute on chronic sCHF exacerbation    Hospital Course  See below    Interval Problem Update  sCHF-diuresed another 1.75 liters, net negative 13 liters for admission    Shaking episodes-none last night but recurred this AM. Patient had same aura of colors and feeling off, but was able to interact with nursing staff during the episodes. EEG pending at this time.     A fib-HR in the 70's mostly, no symptoms per patient including no palpitations.     Consultants/Specialty  Cards  neurology    Code Status  FCfC    Disposition  tele    Review of Systems  Review of Systems   Constitutional: Negative for fever.   Respiratory: Negative for shortness of breath.    Cardiovascular: Positive for leg swelling (more improvement). Negative for chest pain.   Gastrointestinal: Negative for abdominal pain, nausea and vomiting.   Neurological: Positive for tremors, weakness (mild change) and headaches. Negative for dizziness and loss of consciousness.        Continues today     All other systems reviewed and are negative.       Physical Exam  Temp:  [36.7 °C (98 °F)-37.1 °C (98.7 °F)] 37.1 °C (98.7 °F)  Pulse:  [66-89] 78  Resp:  [16-20] 20  BP: (113-135)/(68-90) 131/80    Physical Exam   Constitutional: He is oriented to person, place, and time. He appears well-developed and well-nourished. No distress.   HENT:   Head: Normocephalic and atraumatic.   Mouth/Throat: No oropharyngeal exudate.   Eyes: Pupils are equal, round, and reactive to light.   Neck: Normal range of motion. Neck supple.   Cardiovascular: Normal rate, normal heart sounds and intact distal pulses.    No murmur heard.  Irregular irregular   Pulmonary/Chest: Effort normal. No stridor. He has rales (improved a bit).   Abdominal: Soft. Bowel sounds are normal. He exhibits no distension.   Obese   Musculoskeletal: Normal range of motion. He  exhibits edema (3+ pitting edema B/L LE's, warm peripherally, now decreased). He exhibits no tenderness.   Unchanged today   Neurological: He is alert and oriented to person, place, and time. Coordination normal.   Skin: Skin is warm and dry. No rash noted.   Redundant skin   Psychiatric: He has a normal mood and affect.   Nursing note and vitals reviewed.      Fluids    Intake/Output Summary (Last 24 hours) at 11/24/18 1326  Last data filed at 11/24/18 1100   Gross per 24 hour   Intake              480 ml   Output             3500 ml   Net            -3020 ml       Laboratory  Recent Labs      11/22/18   0008  11/23/18   0159  11/24/18   0302   WBC  5.1  4.7*  7.5   RBC  4.03*  4.12*  4.48*   HEMOGLOBIN  11.6*  11.5*  12.6*   HEMATOCRIT  35.7*  36.6*  39.4*   MCV  88.6  88.8  87.9   MCH  28.8  27.9  28.1   MCHC  32.5*  31.4*  32.0*   RDW  44.9  45.4  44.0   PLATELETCT  170  173  194   MPV  11.0  11.0  10.5     Recent Labs      11/22/18   0007  11/23/18   0159  11/24/18   0302   SODIUM  138  137  137   POTASSIUM  3.6  3.5*  3.9   CHLORIDE  100  98  95*   CO2  30  31  34*   GLUCOSE  99  129*  106*   BUN  29*  28*  30*   CREATININE  1.01  1.03  1.14   CALCIUM  8.9  8.8  9.0             Recent Labs      11/22/18   0007   TRIGLYCERIDE  87   HDL  27*   LDL  60       Imaging  CT-HEAD W/O   Final Result      No acute intracranial abnormality is identified.      Paranasal sinus disease.      CT-HEAD W/O   Final Result      No acute intracranial abnormality is identified.      Paranasal sinus disease.      EC-ECHOCARDIOGRAM COMPLETE W/ CONT   Final Result      EC-ECHOCARDIOGRAM COMPLETE W/O CONT         DX-CHEST-PORTABLE (1 VIEW)   Final Result      1.  Hypoinflation without other evidence for acute cardiopulmonary disease.   2.  Stable cardiomegaly.           Assessment/Plan  Acute on chronic diastolic heart failure (HCC)- (present on admission)   Assessment & Plan    -ECHO with EF 60%, most valves appear ok  -given  diastolic dysfunction and A fib, likely an element of lost atrial kick  -attempt to chemically cardiovert with amio  -continued reduced torsemide to 40 mg daily, net negative 13 liters for admission now  -DCCV tomorrow for atrial fibrillation  -monitor lytes closely, free fluid restriction  -cards consult  -monitor on tele     Class 2 obesity due to excess calories without serious comorbidity with body mass index (BMI) of 37.0 to 37.9 in adult- (present on admission)   Assessment & Plan    Patient will need outpatient weight loss management program.  Body mass index is 37.97 kg/m².       Paroxysmal A-fib (HCC)- (present on admission)   Assessment & Plan    Continue at this point with rate control and anticoagulation.  -loading with amiodarone for possible chemical cardioversion and DCCV possibly tomorrow depending on neurological status  -HR control continues     Seizure-like activity (HCC)   Assessment & Plan    -one recurrence this AM  -no IV ativan PRN at this time, no AED's either  -EEG pending  -Neurology following  -strongly suspect pseudo in origin, might need psych consult  -apparently did this at home 2 weeks prior but never sought medical attention     Syncope- (present on admission)   Assessment & Plan    -shaking is likely related to syncope, no h/o seizure D/O, CTH normal  -repeat CTH at 2 PM given on xarelto, repeat was normal  -no recurrence today  -likely related to orthostatics and slight over diuresis, doing better today  -no HR/rhythm issues during event  -bed alarm, monitor getting up slowly     Essential hypertension- (present on admission)   Assessment & Plan    Continue with blood pressure management optimization keep systolic blood pressure under 140 diastolic under 90.     Mixed hyperlipidemia- (present on admission)   Assessment & Plan    Low-fat low-cholesterol diet.  Continue with statin.  Lab Results   Component Value Date/Time    CHOLSTRLTOT 119 07/10/2018 02:30 AM    LDL 68 07/10/2018  02:30 AM    HDL 24 (A) 07/10/2018 02:30 AM    TRIGLYCERIDE 136 07/10/2018 02:30 AM               GERD (gastroesophageal reflux disease)- (present on admission)   Assessment & Plan    Continue with proton pump inhibitor.  Currently patient is not having heartburn.      Type 2 diabetes mellitus, without long-term current use of insulin, with peripheral neuropathy (HCC)- (present on admission)   Assessment & Plan    -accus with sliding scale coverage, sugars remain well controlled  -fasting blood sugar during RR was normal  -diabetic diet  -diabetic education  -continue with oral antihyperglycemics  -monitor for hypoglycemic episodes and adjust control if he should get low          VTE prophylaxis: xarelto

## 2018-11-24 NOTE — CARE PLAN
Problem: Communication  Goal: The ability to communicate needs accurately and effectively will improve  Outcome: PROGRESSING AS EXPECTED    Intervention: [place] patient and significant other/support system to call light to alert staff of needs  Patient has been oriented to call light system and has been able to communicate needs.      Problem: Infection  Goal: Will remain free from infection  Outcome: PROGRESSING AS EXPECTED    Intervention: Assess signs and symptoms of infection  Patient has been assessed for signs and symptoms of infection.

## 2018-11-24 NOTE — PROGRESS NOTES
Pt called RN with call light, when RN came into room it appeared pt was having seizure activity with some shaking and eyes rolling back. Pupils even and reactive. O2 applied and pt turned to side. RN called MD Patel, awaiting call back. Seizure activity lasted about 6 minutes. Pt A&Ox4 now and able to follow commands and answer questions appropriately. Pt states he saw an aura of colors before activity started but also states he is able to hear everything that is being said to him during apparent seizure. VSS. Pt sating well on RA.

## 2018-11-25 LAB
ANION GAP SERPL CALC-SCNC: 8 MMOL/L (ref 0–11.9)
BUN SERPL-MCNC: 30 MG/DL (ref 8–22)
CALCIUM SERPL-MCNC: 9.2 MG/DL (ref 8.5–10.5)
CHLORIDE SERPL-SCNC: 95 MMOL/L (ref 96–112)
CO2 SERPL-SCNC: 30 MMOL/L (ref 20–33)
CREAT SERPL-MCNC: 1.1 MG/DL (ref 0.5–1.4)
GLUCOSE SERPL-MCNC: 103 MG/DL (ref 65–99)
MAGNESIUM SERPL-MCNC: 2.1 MG/DL (ref 1.5–2.5)
POTASSIUM SERPL-SCNC: 4 MMOL/L (ref 3.6–5.5)
SODIUM SERPL-SCNC: 133 MMOL/L (ref 135–145)

## 2018-11-25 PROCEDURE — 770020 HCHG ROOM/CARE - TELE (206)

## 2018-11-25 PROCEDURE — 99232 SBSQ HOSP IP/OBS MODERATE 35: CPT | Performed by: INTERNAL MEDICINE

## 2018-11-25 PROCEDURE — 700102 HCHG RX REV CODE 250 W/ 637 OVERRIDE(OP): Performed by: HOSPITALIST

## 2018-11-25 PROCEDURE — A9270 NON-COVERED ITEM OR SERVICE: HCPCS | Performed by: INTERNAL MEDICINE

## 2018-11-25 PROCEDURE — 700102 HCHG RX REV CODE 250 W/ 637 OVERRIDE(OP): Performed by: INTERNAL MEDICINE

## 2018-11-25 PROCEDURE — A9270 NON-COVERED ITEM OR SERVICE: HCPCS | Performed by: HOSPITALIST

## 2018-11-25 PROCEDURE — 36415 COLL VENOUS BLD VENIPUNCTURE: CPT

## 2018-11-25 PROCEDURE — A6250 SKIN SEAL PROTECT MOISTURIZR: HCPCS | Performed by: INTERNAL MEDICINE

## 2018-11-25 PROCEDURE — 83735 ASSAY OF MAGNESIUM: CPT

## 2018-11-25 PROCEDURE — 80048 BASIC METABOLIC PNL TOTAL CA: CPT

## 2018-11-25 RX ADMIN — NYSTATIN: 100000 POWDER TOPICAL at 12:44

## 2018-11-25 RX ADMIN — AMIODARONE HYDROCHLORIDE 400 MG: 200 TABLET ORAL at 06:10

## 2018-11-25 RX ADMIN — TORSEMIDE 40 MG: 20 TABLET ORAL at 06:09

## 2018-11-25 RX ADMIN — ACETAMINOPHEN 650 MG: 325 TABLET, FILM COATED ORAL at 06:17

## 2018-11-25 RX ADMIN — TRIAMCINOLONE ACETONIDE: 0.25 CREAM TOPICAL at 12:44

## 2018-11-25 RX ADMIN — LISINOPRIL 5 MG: 5 TABLET ORAL at 06:10

## 2018-11-25 RX ADMIN — STANDARDIZED SENNA CONCENTRATE AND DOCUSATE SODIUM 2 TABLET: 8.6; 5 TABLET, FILM COATED ORAL at 17:30

## 2018-11-25 RX ADMIN — ACETAMINOPHEN 650 MG: 325 TABLET, FILM COATED ORAL at 22:08

## 2018-11-25 RX ADMIN — POTASSIUM CHLORIDE 20 MEQ: 1500 TABLET, EXTENDED RELEASE ORAL at 06:10

## 2018-11-25 RX ADMIN — AMIODARONE HYDROCHLORIDE 400 MG: 200 TABLET ORAL at 17:50

## 2018-11-25 RX ADMIN — TRIAMCINOLONE ACETONIDE: 0.25 CREAM TOPICAL at 17:31

## 2018-11-25 RX ADMIN — SPIRONOLACTONE 25 MG: 25 TABLET ORAL at 06:10

## 2018-11-25 RX ADMIN — NYSTATIN: 100000 POWDER TOPICAL at 06:13

## 2018-11-25 RX ADMIN — TRIAMCINOLONE ACETONIDE: 0.25 CREAM TOPICAL at 06:13

## 2018-11-25 RX ADMIN — RIVAROXABAN 20 MG: 20 TABLET, FILM COATED ORAL at 17:30

## 2018-11-25 RX ADMIN — AMIODARONE HYDROCHLORIDE 400 MG: 200 TABLET ORAL at 12:43

## 2018-11-25 RX ADMIN — TAMSULOSIN HYDROCHLORIDE 0.4 MG: 0.4 CAPSULE ORAL at 08:59

## 2018-11-25 RX ADMIN — PRAVASTATIN SODIUM 20 MG: 20 TABLET ORAL at 17:30

## 2018-11-25 RX ADMIN — NYSTATIN: 100000 POWDER TOPICAL at 17:31

## 2018-11-25 ASSESSMENT — ENCOUNTER SYMPTOMS
WHEEZING: 0
HEADACHES: 0
WEAKNESS: 1
SHORTNESS OF BREATH: 0
DIARRHEA: 0
LOSS OF CONSCIOUSNESS: 0
DIZZINESS: 0
TREMORS: 0
CHEST TIGHTNESS: 0
APNEA: 0
COUGH: 0
CHILLS: 0
CHOKING: 0
PALPITATIONS: 0
STRIDOR: 0

## 2018-11-25 ASSESSMENT — PAIN SCALES - GENERAL
PAINLEVEL_OUTOF10: 5
PAINLEVEL_OUTOF10: 0

## 2018-11-25 NOTE — DISCHARGE PLANNING
Patient LACE Score = 81  - Previous admissions past 6 months: Bilateral LE swelling x2, Chest pain x2, cellulitis, Paroxysmal A-fib x4 with RVR, GLF x2    Per conversation with patient, he understands the symptoms for his current admission. Pt had weight increase of ~40lbs in the past two weeks, since admission about 15L has been removed. Pt states that he is feeling better but has more fluid to be taken off, pt does not know his dry weight. Pt cannot speak to HF education, pt will require HF eduction prior to discharge with follow up appointment within 7 days. [facility], pt recently moved to [place] and would benefits from having a [facility] closer to his residence. EEG and cardioversion pending. Pt admits to trouble ambulating, has had home health in the past and feels home health would benefit him at discharge. Pt does not have any DME currently but ambulates better and agrees to use a walker for increased stability when ambulating, RN to follow up with PT/OT for DME benefit. Pt has had minor troubles getting to doctors appointments, pt has [place] Medicaid and may have transportation assistance to medical appointments, RN to follow up with SW. Address on face sheet in most up to date, pt has only resided at this location for on week, was previously homeless.     Upon assessment, this RN noted decreased strength in left upper extremity and limited ROM at the shoulder, pain in bilateral lower extremities, all other neuro faculties are intact  Pt had fall in the hospital, per conversation with bedside RN, reduced function could be result from fall, will continue to monitor.       Follow Up:  Cardioversion pending  EEG pending   HF education   7 day follow up appointment  Medical transportation assistance   Update pharmacy   PT/OT evaluation - H.H.?  DME - walker?

## 2018-11-25 NOTE — PROGRESS NOTES
Hospital Medicine Daily Progress Note    Date of Service  11/25/2018    Chief Complaint  53 y.o. male admitted 11/21/2018 with acute on chronic sCHF exacerbation    Hospital Course  See below    Interval Problem Update  sCHF-feeling better today, but restless, Now net negative 14.1 liters for admission. Denies any true SOB    Shaking episodes-none today, EEG still pending.     A fib-TELE shows persistent a fib, HR 61-81. Denies any palpitations.    Consultants/Specialty  Cards  neurology    Code Status  FCfC    Disposition  tele    Review of Systems  Review of Systems   Constitutional: Negative for chills.   HENT: Negative for hearing loss.    Respiratory: Negative for shortness of breath.    Cardiovascular: Positive for leg swelling (very small decrease). Negative for palpitations.   Gastrointestinal: Negative for diarrhea.   Neurological: Positive for weakness (approaching his baseline). Negative for dizziness, tremors, loss of consciousness and headaches.             All other systems reviewed and are negative.       Physical Exam  Temp:  [36.1 °C (97 °F)-36.6 °C (97.9 °F)] 36.2 °C (97.1 °F)  Pulse:  [72-80] 80  Resp:  [16] 16  BP: ()/(57-68) 99/68    Physical Exam   Constitutional: He is oriented to person, place, and time. He appears well-developed and well-nourished. No distress.   Much more coherent today   HENT:   Head: Normocephalic and atraumatic.   Mouth/Throat: No oropharyngeal exudate.   Eyes: Pupils are equal, round, and reactive to light. Right eye exhibits no discharge. Left eye exhibits no discharge.   Neck: Normal range of motion. Neck supple.   Cardiovascular: Normal rate, normal heart sounds and intact distal pulses.    No murmur heard.  Irregular irregular   Pulmonary/Chest: Effort normal. No stridor. He has rales (improved a bit).   Similar level of aeration today   Abdominal: Soft. Bowel sounds are normal. He exhibits no distension.   Obese   Musculoskeletal: Normal range of motion. He  exhibits edema (3+ pitting edema B/L LE's, warm peripherally, no appreciable change noted). He exhibits no tenderness.   Neurological: He is alert and oriented to person, place, and time. No cranial nerve deficit.   Skin: Skin is warm and dry. No rash noted.   Redundant skin   Psychiatric: He has a normal mood and affect.   Nursing note and vitals reviewed.      Fluids    Intake/Output Summary (Last 24 hours) at 11/25/18 1540  Last data filed at 11/25/18 1250   Gross per 24 hour   Intake              965 ml   Output             2100 ml   Net            -1135 ml       Laboratory  Recent Labs      11/23/18   0159 11/24/18   0302   WBC  4.7*  7.5   RBC  4.12*  4.48*   HEMOGLOBIN  11.5*  12.6*   HEMATOCRIT  36.6*  39.4*   MCV  88.8  87.9   MCH  27.9  28.1   MCHC  31.4*  32.0*   RDW  45.4  44.0   PLATELETCT  173  194   MPV  11.0  10.5     Recent Labs      11/23/18   0159 11/24/18   0302  11/25/18   0204   SODIUM  137  137  133*   POTASSIUM  3.5*  3.9  4.0   CHLORIDE  98  95*  95*   CO2  31  34*  30   GLUCOSE  129*  106*  103*   BUN  28*  30*  30*   CREATININE  1.03  1.14  1.10   CALCIUM  8.8  9.0  9.2                   Imaging  CT-HEAD W/O   Final Result      No acute intracranial abnormality is identified.      Paranasal sinus disease.      CT-HEAD W/O   Final Result      No acute intracranial abnormality is identified.      Paranasal sinus disease.      EC-ECHOCARDIOGRAM COMPLETE W/ CONT   Final Result      EC-ECHOCARDIOGRAM COMPLETE W/O CONT         DX-CHEST-PORTABLE (1 VIEW)   Final Result      1.  Hypoinflation without other evidence for acute cardiopulmonary disease.   2.  Stable cardiomegaly.           Assessment/Plan  Acute on chronic diastolic heart failure (HCC)- (present on admission)   Assessment & Plan    -ECHO with EF 60%, most valves appear ok  -given diastolic dysfunction and A fib, likely an element of lost atrial kick  -attempt to chemically cardiovert with amio and DCCV tomorrow  -continued reduced  torsemide to 40 mg daily, net negative 14 liters for admission now, no adjustments needed today, dry weight to be obtained today  -DCCV tomorrow for atrial fibrillation  -monitor lytes closely, free fluid restriction  -cards consult  -monitor on tele     Class 2 obesity due to excess calories without serious comorbidity with body mass index (BMI) of 37.0 to 37.9 in adult- (present on admission)   Assessment & Plan    Patient will need outpatient weight loss management program.  Body mass index is 37.97 kg/m².       Paroxysmal A-fib (HCC)- (present on admission)   Assessment & Plan    Continue at this point with rate control and anticoagulation.  -loading with amiodarone for possible chemical cardioversion and DCCV tomorrow, remains in a fib  -HR control continues     Seizure-like activity (HCC)   Assessment & Plan    -no recurrence in the past 24 hours  -no IV ativan PRN at this time, no AED's either  -EEG pending  -Neurology following  -strongly suspect pseudo in origin, might need psych consult  -apparently did this at home 2 weeks prior but never sought medical attention     Syncope- (present on admission)   Assessment & Plan    -shaking is likely related to syncope, no h/o seizure D/O, CTH normal  -repeat CTH at 2 PM given on xarelto, repeat was normal  -no recurrence today  -likely related to orthostatics and slight over diuresis, doing better today  -no HR/rhythm issues during event  -bed alarm, monitor getting up slowly     Essential hypertension- (present on admission)   Assessment & Plan    Continue with blood pressure management optimization keep systolic blood pressure under 140 diastolic under 90.     Mixed hyperlipidemia- (present on admission)   Assessment & Plan    Low-fat low-cholesterol diet.  Continue with statin.  Lab Results   Component Value Date/Time    CHOLSTRLTOT 119 07/10/2018 02:30 AM    LDL 68 07/10/2018 02:30 AM    HDL 24 (A) 07/10/2018 02:30 AM    TRIGLYCERIDE 136 07/10/2018 02:30 AM                GERD (gastroesophageal reflux disease)- (present on admission)   Assessment & Plan    Continue with proton pump inhibitor.  Currently patient is not having heartburn.      Type 2 diabetes mellitus, without long-term current use of insulin, with peripheral neuropathy (HCC)- (present on admission)   Assessment & Plan    -accus with sliding scale coverage, sugars remain well controlled  -fasting blood sugar during RR was normal  -diabetic diet  -diabetic education  -continue with oral antihyperglycemics  -monitor for hypoglycemic episodes and adjust control if he should get low          VTE prophylaxis: xarelto

## 2018-11-25 NOTE — CARE PLAN
Problem: Knowledge Deficit  Goal: Knowledge of disease process/condition, treatment plan, diagnostic tests, and medications will improve  Outcome: PROGRESSING SLOWER THAN EXPECTED      Problem: Skin Integrity  Goal: Risk for impaired skin integrity will decrease  Outcome: PROGRESSING AS EXPECTED

## 2018-11-25 NOTE — PROGRESS NOTES
Cardiology Follow Up Progress Note    Date of Service  11/25/2018    Attending Physician  Sathish Patel M.D.    Reason for consultation   Dyspnea, volume overload      History of     Atrial fibrillation, hypertension, dyslipidemia, type 2 diabetes    Admitted for   Dyspnea, leg swelling, 40 pounds weight gain      Interim Events    11/25/18, A. fib rate controlled, diuresing well   11/24/18, afib, rate controlled, diuresed well overnight, low suspicious for seizure activities, EEG pending   11/23/18, low potassium, K3.5, negative troponin,  in the setting of high BMI    11/22/18, echo revealed LVEF 60%, indeterminate diastolic function    Review of Systems  Review of Systems   Respiratory: Negative for apnea, cough, choking, chest tightness, shortness of breath, wheezing and stridor.    Cardiovascular: Positive for leg swelling. Negative for chest pain.       Vital signs in last 24 hours  Temp:  [36.1 °C (97 °F)-36.7 °C (98.1 °F)] 36.1 °C (97 °F)  Pulse:  [72-86] 80  Resp:  [16] 16  BP: ()/(57-94) 106/68    Physical Exam  Physical Exam   Constitutional: He is oriented to person, place, and time.   HENT:   Head: Normocephalic.   Eyes: Conjunctivae are normal.   Neck: No thyromegaly present.   Cardiovascular: Normal rate.  An irregularly irregular rhythm present.   Pulses:       Carotid pulses are 2+ on the right side, and 2+ on the left side.       Radial pulses are 2+ on the right side, and 2+ on the left side.   Pulmonary/Chest: He has no wheezes.   Abdominal: Soft.   Musculoskeletal: He exhibits edema.   Neurological: He is alert and oriented to person, place, and time.   Skin: Skin is warm and dry.       Lab Review  Lab Results   Component Value Date/Time    WBC 7.5 11/24/2018 03:02 AM    RBC 4.48 (L) 11/24/2018 03:02 AM    HEMOGLOBIN 12.6 (L) 11/24/2018 03:02 AM    HEMATOCRIT 39.4 (L) 11/24/2018 03:02 AM    MCV 87.9 11/24/2018 03:02 AM    MCH 28.1 11/24/2018 03:02 AM    MCHC 32.0 (L) 11/24/2018  03:02 AM    MPV 10.5 11/24/2018 03:02 AM      Lab Results   Component Value Date/Time    SODIUM 133 (L) 11/25/2018 02:04 AM    POTASSIUM 4.0 11/25/2018 02:04 AM    CHLORIDE 95 (L) 11/25/2018 02:04 AM    CO2 30 11/25/2018 02:04 AM    GLUCOSE 103 (H) 11/25/2018 02:04 AM    BUN 30 (H) 11/25/2018 02:04 AM    CREATININE 1.10 11/25/2018 02:04 AM      Lab Results   Component Value Date/Time    ASTSGOT 23 11/21/2018 08:36 AM    ALTSGPT 19 11/21/2018 08:36 AM     Lab Results   Component Value Date/Time    CHOLSTRLTOT 104 11/22/2018 12:07 AM    LDL 60 11/22/2018 12:07 AM    HDL 27 (A) 11/22/2018 12:07 AM    TRIGLYCERIDE 87 11/22/2018 12:07 AM    TROPONINI <0.01 11/21/2018 06:22 PM               Assessment    HFpEF  LVEF 60%  Obesity, BMI 38  Atrial fibrillation, chronic  On chronic anticoagulation with xarelto 20 mg  Hypertension  Hyperlipidemia      Plan  Pseudo-seizures EEG pending, appreciate neuro, may need psych consult.  Chronic A. Fib, rate controlled   Continue with amiodarone 400 mg TID  Continue with oral anticoagulation with Xarelto 20 mg   Acute on chronic diastolic heart failure   Continue with torsemide 40 mg daily  I/O =[id number] since admit  Standing weight 242# ( reports he was 210# 2 years ago )  Plan for DCCV when euvolemic, will plan for Monday 11/26/18  N.p.o. after midnight  BMP in am

## 2018-11-25 NOTE — PROGRESS NOTES
Assumed care of pt. He is alert and oriented/denies pain. Discussed POC with pt and dayshift RN. He denies any questions or concerns at this time. Discussed safety with pt, he verbalized the importance of calling before attempting to get out of bed. Bed is locked in lowest position and call light/personal belongings are within reach. ALl high risk fall precautions are in place.

## 2018-11-25 NOTE — CARE PLAN
Problem: Safety  Goal: Will remain free from falls  Outcome: PROGRESSING SLOWER THAN EXPECTED  Patient will remain free from fall during this shift.  Plan of care discussed with patient. Bed alarm active, call light within reach and patient encouraged to notify staff if assistance is needed.    Problem: Skin Integrity  Goal: Risk for impaired skin integrity will decrease  Outcome: PROGRESSING AS EXPECTED  Patient will remain free from skin breakdown during this shift.  Skin assessed, every two hours turn will be encouraged or completed and adequate nutrition will be discussed.

## 2018-11-25 NOTE — PROGRESS NOTES
Report received from ELISA Oropeza. Assumed care of patient. Updated patient on plan of care. Fall precautions in place and call light within reach.

## 2018-11-26 ENCOUNTER — APPOINTMENT (OUTPATIENT)
Dept: VASCULAR LAB | Facility: MEDICAL CENTER | Age: 53
End: 2018-11-26
Payer: MEDICAID

## 2018-11-26 LAB
ANION GAP SERPL CALC-SCNC: 8 MMOL/L (ref 0–11.9)
BUN SERPL-MCNC: 39 MG/DL (ref 8–22)
CALCIUM SERPL-MCNC: 9.3 MG/DL (ref 8.5–10.5)
CHLORIDE SERPL-SCNC: 95 MMOL/L (ref 96–112)
CO2 SERPL-SCNC: 30 MMOL/L (ref 20–33)
CREAT SERPL-MCNC: 1.26 MG/DL (ref 0.5–1.4)
GLUCOSE SERPL-MCNC: 137 MG/DL (ref 65–99)
POTASSIUM SERPL-SCNC: 4.1 MMOL/L (ref 3.6–5.5)
SODIUM SERPL-SCNC: 133 MMOL/L (ref 135–145)

## 2018-11-26 PROCEDURE — 93005 ELECTROCARDIOGRAM TRACING: CPT | Performed by: INTERNAL MEDICINE

## 2018-11-26 PROCEDURE — A9270 NON-COVERED ITEM OR SERVICE: HCPCS | Performed by: HOSPITALIST

## 2018-11-26 PROCEDURE — A9270 NON-COVERED ITEM OR SERVICE: HCPCS | Performed by: INTERNAL MEDICINE

## 2018-11-26 PROCEDURE — 5A2204Z RESTORATION OF CARDIAC RHYTHM, SINGLE: ICD-10-PCS | Performed by: INTERNAL MEDICINE

## 2018-11-26 PROCEDURE — 36415 COLL VENOUS BLD VENIPUNCTURE: CPT

## 2018-11-26 PROCEDURE — 99232 SBSQ HOSP IP/OBS MODERATE 35: CPT | Performed by: INTERNAL MEDICINE

## 2018-11-26 PROCEDURE — 4A00X4Z MEASUREMENT OF CENTRAL NERVOUS ELECTRICAL ACTIVITY, EXTERNAL APPROACH: ICD-10-PCS | Performed by: PSYCHIATRY & NEUROLOGY

## 2018-11-26 PROCEDURE — 700102 HCHG RX REV CODE 250 W/ 637 OVERRIDE(OP): Performed by: HOSPITALIST

## 2018-11-26 PROCEDURE — 700102 HCHG RX REV CODE 250 W/ 637 OVERRIDE(OP): Performed by: INTERNAL MEDICINE

## 2018-11-26 PROCEDURE — 93010 ELECTROCARDIOGRAM REPORT: CPT | Performed by: INTERNAL MEDICINE

## 2018-11-26 PROCEDURE — 92960 CARDIOVERSION ELECTRIC EXT: CPT

## 2018-11-26 PROCEDURE — 160002 HCHG RECOVERY MINUTES (STAT)

## 2018-11-26 PROCEDURE — 700111 HCHG RX REV CODE 636 W/ 250 OVERRIDE (IP)

## 2018-11-26 PROCEDURE — 304952 HCHG R 2 PADS

## 2018-11-26 PROCEDURE — 95951 EEG: CPT | Mod: 52 | Performed by: PSYCHIATRY & NEUROLOGY

## 2018-11-26 PROCEDURE — 80048 BASIC METABOLIC PNL TOTAL CA: CPT

## 2018-11-26 PROCEDURE — 95951 EEG: CPT | Mod: 26,52 | Performed by: PSYCHIATRY & NEUROLOGY

## 2018-11-26 PROCEDURE — 770020 HCHG ROOM/CARE - TELE (206)

## 2018-11-26 RX ORDER — OXYCODONE HCL 5 MG/5 ML
5 SOLUTION, ORAL ORAL
Status: DISCONTINUED | OUTPATIENT
Start: 2018-11-26 | End: 2018-11-26 | Stop reason: HOSPADM

## 2018-11-26 RX ORDER — OXYCODONE HCL 5 MG/5 ML
10 SOLUTION, ORAL ORAL
Status: DISCONTINUED | OUTPATIENT
Start: 2018-11-26 | End: 2018-11-26 | Stop reason: HOSPADM

## 2018-11-26 RX ORDER — HALOPERIDOL 5 MG/ML
1 INJECTION INTRAMUSCULAR
Status: DISCONTINUED | OUTPATIENT
Start: 2018-11-26 | End: 2018-11-26 | Stop reason: HOSPADM

## 2018-11-26 RX ORDER — OXYCODONE HYDROCHLORIDE 5 MG/1
10 TABLET ORAL
Status: DISCONTINUED | OUTPATIENT
Start: 2018-11-26 | End: 2018-11-26 | Stop reason: HOSPADM

## 2018-11-26 RX ORDER — AMIODARONE HYDROCHLORIDE 200 MG/1
200 TABLET ORAL TWICE DAILY
Status: DISCONTINUED | OUTPATIENT
Start: 2018-11-27 | End: 2018-11-27

## 2018-11-26 RX ORDER — SODIUM CHLORIDE 9 MG/ML
INJECTION, SOLUTION INTRAVENOUS CONTINUOUS
Status: DISCONTINUED | OUTPATIENT
Start: 2018-11-26 | End: 2018-11-26 | Stop reason: HOSPADM

## 2018-11-26 RX ORDER — OXYCODONE HYDROCHLORIDE 5 MG/1
5 TABLET ORAL
Status: DISCONTINUED | OUTPATIENT
Start: 2018-11-26 | End: 2018-11-26 | Stop reason: HOSPADM

## 2018-11-26 RX ORDER — ONDANSETRON 2 MG/ML
4 INJECTION INTRAMUSCULAR; INTRAVENOUS
Status: DISCONTINUED | OUTPATIENT
Start: 2018-11-26 | End: 2018-11-26 | Stop reason: HOSPADM

## 2018-11-26 RX ADMIN — TRIAMCINOLONE ACETONIDE: 0.25 CREAM TOPICAL at 18:09

## 2018-11-26 RX ADMIN — AMIODARONE HYDROCHLORIDE 400 MG: 200 TABLET ORAL at 05:17

## 2018-11-26 RX ADMIN — AMIODARONE HYDROCHLORIDE 400 MG: 200 TABLET ORAL at 12:54

## 2018-11-26 RX ADMIN — NYSTATIN: 100000 POWDER TOPICAL at 05:17

## 2018-11-26 RX ADMIN — SPIRONOLACTONE 25 MG: 25 TABLET ORAL at 05:17

## 2018-11-26 RX ADMIN — POTASSIUM CHLORIDE 20 MEQ: 1500 TABLET, EXTENDED RELEASE ORAL at 05:17

## 2018-11-26 RX ADMIN — LISINOPRIL 5 MG: 5 TABLET ORAL at 05:17

## 2018-11-26 RX ADMIN — NYSTATIN: 100000 POWDER TOPICAL at 18:09

## 2018-11-26 RX ADMIN — TRIAMCINOLONE ACETONIDE: 0.25 CREAM TOPICAL at 05:17

## 2018-11-26 RX ADMIN — TAMSULOSIN HYDROCHLORIDE 0.4 MG: 0.4 CAPSULE ORAL at 09:10

## 2018-11-26 RX ADMIN — TORSEMIDE 40 MG: 20 TABLET ORAL at 05:17

## 2018-11-26 RX ADMIN — PRAVASTATIN SODIUM 20 MG: 20 TABLET ORAL at 18:09

## 2018-11-26 RX ADMIN — RIVAROXABAN 20 MG: 20 TABLET, FILM COATED ORAL at 18:09

## 2018-11-26 RX ADMIN — NYSTATIN: 100000 POWDER TOPICAL at 12:54

## 2018-11-26 RX ADMIN — TRIAMCINOLONE ACETONIDE: 0.25 CREAM TOPICAL at 12:55

## 2018-11-26 RX ADMIN — ACETAMINOPHEN 650 MG: 325 TABLET, FILM COATED ORAL at 20:10

## 2018-11-26 ASSESSMENT — ENCOUNTER SYMPTOMS
ABDOMINAL PAIN: 0
COUGH: 0
PALPITATIONS: 0
LOSS OF CONSCIOUSNESS: 0
WEAKNESS: 1
CHILLS: 0
CHEST TIGHTNESS: 0
SHORTNESS OF BREATH: 0
WHEEZING: 0
HEADACHES: 0
TREMORS: 0
AGITATION: 0
ABDOMINAL DISTENTION: 0
LIGHT-HEADEDNESS: 0
NAUSEA: 0
DIZZINESS: 0
DIARRHEA: 0
COLOR CHANGE: 0
FEVER: 0
UNEXPECTED WEIGHT CHANGE: 1

## 2018-11-26 ASSESSMENT — PAIN SCALES - GENERAL
PAINLEVEL_OUTOF10: 0
PAINLEVEL_OUTOF10: 3
PAINLEVEL_OUTOF10: 0

## 2018-11-26 NOTE — PROCEDURES
EEG 11/26/18 8:47 PM    VIDEO ELECTROENCEPHALOGRAM / EPILEPSY MONITORING UNIT REPORT      Referring provider: DR. SHEPARD    DOS:   11/26/2018      INDICATION:  Sebastián Castro 53 y.o. male presenting with Shaking episodes    CURRENT ANTIEPILEPTIC REGIMEN: NONE     DURATION: 24 minutes      TECHNIQUE: A 30-channel video electroencephalogram (VEEG) was performed in accordance with the international 10-20 system. This digital study was reviewed in bipolar and referential montages. The recording examined the patient during wakeful, drowsy and sleep states.         DESCRIPTION OF THE RECORD:  During the awake state, background shows symmetrical 10-11 Hz alpha activity posteriorly with amplitude of 70 mV.  There was reactivity with eye opening/closure.  Normal anterior-posterior gradient was noted with faster beta frequencies seen anteriorly.  During drowsiness, high-amplitude delta frequencies were seen.    During the sleep state, background shows diffuse high-amplitude 4-5 Hz delta activity.  Symmetrical high-amplitude sleep spindles and vertex sharp activities were seen in the central leads.    ACTIVATION PROCEDURES:   Hyperventilation was not performed by the patient.   Intermittent Photic stimulation was  performed in a stepwise fashion from 1 to 30 Hz and elicited a normal response (photic driving), most noticeable in the posterior leads.      ICTAL AND/OR INTERICTAL FINDINGS:   No focal or generalized epileptiform activity was noted. No regional slowing was seen during this study.  No seizures were recorded during the study.     EVENT(S):  NONE    EKG: sampling review of EKG recording demonstrated regular rhythm      INTERPRETATION:       ________________________________________________________________________    This is normal routine video EEG recording in the awake and drowsy/sleep state(s).    This scalp video EEG remains not remarkable    Of note, unremarkable EEG does not completely exclude the diagnosis  of  seizures since seizure is an episodic phenomena and frontal lobe seizures could have normal scalp EEG. Clinical correlation may help     If clinical suspicion of seizure remains high.  Prolonged outpatient EEG   monitoring may be of help.    EEG 11/26/18 8:59 PM  ________________________________________________________________________

## 2018-11-26 NOTE — PROGRESS NOTES
Assumed care. REport recived from dayshift RN. Pt is alert and oriented and does not appears to be in resp distress. BEd locked in lowest position and call light/personal belongings are within reach.

## 2018-11-26 NOTE — PROGRESS NOTES
Cardiology Follow Up Progress Note    Date of Service  11/26/2018    Attending Physician  Sathish Patel M.D.    Chief Complaint/Reason for admission  Dyspnea, volume overload, a.fib with RVR    HPI  Sebastián Castro is a 53 y.o. male with past medical history significant for tachycardia induced cardiomyopathy, paroxysmal atrial fibrillation on DOAC and amiodarone, although has history of medication noncompliance, admitted 11/21/2018 with acute on chronic HF and volume overload.    Interim Events  Patient has been aggressively diuresed with a weight loss of ~14kg since admission. His shortness of breath has improved, denies orthopnea, PND, WOODWARD. Continues to have occasional sharp shooting right chest pain, lasting a few seconds, last felt early this morning. Has good understanding of his atrial fibrillation and how it relates to his heart failure. Awaiting DCCV today.     Monitor review: No events, afib on the monitor rates 60-88 overnight  I/Os: -930mL since midnight, -14.7L over admission    Review of Systems  Review of Systems   Constitutional: Positive for unexpected weight change. Negative for chills and fever.        Reports 20lb weight gain   Respiratory: Negative for cough, chest tightness, shortness of breath and wheezing.    Cardiovascular: Positive for chest pain and leg swelling. Negative for palpitations.        Sharp shooting pain, lasts seconds, intermittent   Gastrointestinal: Negative for abdominal distention, abdominal pain and nausea.   Genitourinary: Negative for difficulty urinating.   Skin: Negative for color change.   Neurological: Negative for syncope and light-headedness.   Psychiatric/Behavioral: Negative for agitation.       Vital signs in last 24 hours  Temp:  [36.2 °C (97.1 °F)-37.2 °C (98.9 °F)] 36.2 °C (97.1 °F)  Pulse:  [66-86] 86  Resp:  [16-18] 18  BP: ()/(56-83) 122/83    Physical Exam  Physical Exam   Constitutional: He is oriented to person, place, and time. He appears  well-developed and well-nourished. No distress.   Obesity, BMI 34   HENT:   Head: Atraumatic.   Mouth/Throat: Oropharynx is clear and moist.   Neck: Neck supple. No JVD present.   Cardiovascular: Intact distal pulses.  An irregularly irregular rhythm present. Tachycardia present.    No murmur heard.  Pulmonary/Chest: Effort normal and breath sounds normal. No accessory muscle usage. No respiratory distress.   Abdominal: Soft. Normal appearance and bowel sounds are normal. There is no tenderness.   Musculoskeletal: He exhibits edema.   Bilateral LE edema   Neurological: He is alert and oriented to person, place, and time.   Skin: Skin is warm and dry.   Dark red discoloration on bilateral shins, left>right       Lab Review     Lab Results   Component Value Date/Time    SODIUM 133 (L) 11/26/2018 01:14 AM    POTASSIUM 4.1 11/26/2018 01:14 AM    CHLORIDE 95 (L) 11/26/2018 01:14 AM    CO2 30 11/26/2018 01:14 AM    GLUCOSE 137 (H) 11/26/2018 01:14 AM    BUN 39 (H) 11/26/2018 01:14 AM    CREATININE 1.26 11/26/2018 01:14 AM      Lab Results   Component Value Date/Time    ASTSGOT 23 11/21/2018 08:36 AM    ALTSGPT 19 11/21/2018 08:36 AM     Lab Results   Component Value Date/Time    CHOLSTRLTOT 104 11/22/2018 12:07 AM    LDL 60 11/22/2018 12:07 AM    HDL 27 (A) 11/22/2018 12:07 AM    TRIGLYCERIDE 87 11/22/2018 12:07 AM    TROPONINI <0.01 11/21/2018 06:22 PM             Cardiac Imaging and Procedures Review  EKG:  My personal interpretation of the EKG dated 11/23 shows atrial fibrillation with rate of 84.     Echocardiogram 11/22:   Left ventricular ejection fraction is visually estimated to be 60%.  Mild concentric left ventricular hypertrophy.  Mild mitral annular calcifcation, mild MR.  Mild tricuspid regurgitation.  Estimated right ventricular systolic pressure  is 30 mmHg.    Imaging  Stress Test:  1/29/18  NM stress test: no ischemic T wave changes, normal stress ECG, no evidence of myocardial ischemia or old  infarcts.    Assessment/Plan  1. HFpEF, LVEF 60%  -Aggressively diuresed, now on Torsemide 40mg daily, checking lytes daily, metabolic alkalosis and bump in Cr today, likely from diuresis  - Lisinopril 5mg daily  - Sprinolactone 25mg daily  - Previously on metoprolol tartrate 25mg BID, d/c'd in hospital, will watch rates overnight and discuss BB prior to discharge  - will see in cardiology clinic as outpatient    2. Volume Overload  - Given discoloration of lower legs, appears LE edema is multifactorial  - Consider wrapping lower extremities  - Lungs clear on exam today, no JVD  - Hold Torsemide tomorrow given bump in Cr and metabolic alkalosis, reassess diuretic dosing tomorrow    3. Atrial fibrillation, chronic s/p successful cardioversion 11/26  -On chronic anticoagulation with Xarelto 20 mg  -Amiodarone load prior to cardioversion  -Continue Amiodarone 200mg BID    4. Hypertension  - Controlled on current medication regimen    5. Hyperlipidemia  - on pravastatin     6. Obesity, BMI 34       Thank you for allowing me to participate in the care of this patient.  I will continue to follow this patient    Please contact me with any questions.    Carly Renteria P.A.-C.   [facility]  [phone]

## 2018-11-26 NOTE — HEART FAILURE PROGRAM
Cardiovascular Nurse Navigator () Advanced Heart Failure Program Inpatient Progress Note:     HFpEF (60%), planned for DCCV today.    Patient presented on 11/21 with leg swelling. Endorsed 40 pound weight gain in past 2 weeks. No indication from chart review that patient reached out to a provider about this.    Reaching out to a provider for worsening signs and symptoms is a tenet of heart failure self management.     In his office visit documentation on 10/9/18, Dr. Sanchez noted that the patient has been non compliant with medications and could not speak to his medications or his HF diagnosis. Patient's HR was 120 on that day.    Of note, patient has been dismissed from the [facility] for too many no-shows.    Patient has never been seen at the HF Clinic and he has had 14 ED Visits/Admissions since his home health was concluded on 5/2/18.    I's and O's:  Already ordered    Daily Weights:  Ordered since 11/22, I updated to include patient education while doing weights.    GD Secondary Prevention interventions:    · Pneumococcal vaccine: contraindication per admit bunny  · Influenza vaccine: previous this season per admit bunny    [facility] Plan Notes:   Discharge RN is following  Therapy Notes:   none  Insurance Coverage:  Medicaid  Patient Residence:  [place]  Advanced Care Planning:  No AD on file. full code status at the time of this note's filing.    The ACC recommends engaging palliative care as part of optimization of HFrEF treatment to solicit goals of care and focus on quality of life throughout the clinical course of HF.    Once all diagnostics are in, please consider an order for palliative to discuss Advanced Care Planning.      Speaking with patients frankly about their end-of-life wishes is one of the most important things a palliative care team can do. This is especially important in the context of heart failure, since it’s such an unpredictable disease.    Follow up appointment:   If discharged  "from acute care to home (exception hospice discharge), pt must have an appointment scheduled within 7 days of discharge (Cardiology, PCP, or DC Clinic).    If discharged to Transitional Care Facility (LTAC, SNF, IRH), appointment should be made about a month out for after TCF.     This GAIL has placed an order for Hospital Schedulers to arrange f/u appointment within seven calendar days of anticipated discharge date from acute on 11/28. Get into [facility] eventually if not for seven day appt for ongoing HF management.    Bedside Nursing Education:  Please provide HF packet and repeated, ongoing education while administering medications, weighing patient, discussing management of symptoms, diet and need to follow up and act on changes.     Bedside Nursing Weights:  Please weigh patient daily (standing scale if not clinically contraindicated) and have HIM write weight on HF Calendar. RN/CNA to document weight in Epic.    Please assess pt's understanding of what to do if weight gain occurs. Reinforce education to act (call [phone]) if they gain 3 or more pounds in a 24 hr period or 5 or more pounds in a week.    If pt does not own a working scale and cannot afford to purchase one, please provide one. Scales can be found in the Care Coordination Rooms on T-7&T-8.    Bedside Nursing Discharge:  When completing the after visit summary (discharge instructions) please select \"Cardiac Diagnosis, and Heart Failure\" in the special instructions section to populate the heart failure specific discharge instructions.     Referrals Placed:    Social Work   Ordered to assess possible social determinants of patient's non compliance  Registered Dietician  ordered  [facility] CP Program  Eligible with medicaid, I sent a referral today  Outpatient Care Coordination  Not in the ACO    Thank you and please call GAIL Manzanares with any questions M-F: [id number].     "

## 2018-11-26 NOTE — PROGRESS NOTES
Pt given HF packet. This RN educated the pt regarding heart failure including pathology and physiology of heart failure, medications, diet, activity, daily weights, signs and symptoms of a heart failure exacerbation, and when to contact a doctor. Pt verbalizes understanding and appears motivated to improve his condition.

## 2018-11-27 DIAGNOSIS — I50.33 ACUTE ON CHRONIC DIASTOLIC HEART FAILURE (HCC): ICD-10-CM

## 2018-11-27 PROBLEM — I95.1 ORTHOSTATIC HYPOTENSION: Status: ACTIVE | Noted: 2018-11-27

## 2018-11-27 LAB
ANION GAP SERPL CALC-SCNC: 9 MMOL/L (ref 0–11.9)
BUN SERPL-MCNC: 50 MG/DL (ref 8–22)
CALCIUM SERPL-MCNC: 9.4 MG/DL (ref 8.5–10.5)
CHLORIDE SERPL-SCNC: 95 MMOL/L (ref 96–112)
CO2 SERPL-SCNC: 27 MMOL/L (ref 20–33)
CREAT SERPL-MCNC: 1.48 MG/DL (ref 0.5–1.4)
EKG IMPRESSION: NORMAL
GLUCOSE SERPL-MCNC: 107 MG/DL (ref 65–99)
POTASSIUM SERPL-SCNC: 4.6 MMOL/L (ref 3.6–5.5)
SODIUM SERPL-SCNC: 131 MMOL/L (ref 135–145)

## 2018-11-27 PROCEDURE — 80048 BASIC METABOLIC PNL TOTAL CA: CPT

## 2018-11-27 PROCEDURE — 36415 COLL VENOUS BLD VENIPUNCTURE: CPT

## 2018-11-27 PROCEDURE — A9270 NON-COVERED ITEM OR SERVICE: HCPCS | Performed by: PHYSICIAN ASSISTANT

## 2018-11-27 PROCEDURE — A9270 NON-COVERED ITEM OR SERVICE: HCPCS | Performed by: HOSPITALIST

## 2018-11-27 PROCEDURE — 700102 HCHG RX REV CODE 250 W/ 637 OVERRIDE(OP): Performed by: HOSPITALIST

## 2018-11-27 PROCEDURE — 770020 HCHG ROOM/CARE - TELE (206)

## 2018-11-27 PROCEDURE — 99233 SBSQ HOSP IP/OBS HIGH 50: CPT | Performed by: HOSPITALIST

## 2018-11-27 PROCEDURE — 700105 HCHG RX REV CODE 258: Performed by: NURSE PRACTITIONER

## 2018-11-27 PROCEDURE — 700102 HCHG RX REV CODE 250 W/ 637 OVERRIDE(OP): Performed by: PHYSICIAN ASSISTANT

## 2018-11-27 PROCEDURE — 700105 HCHG RX REV CODE 258: Performed by: HOSPITALIST

## 2018-11-27 RX ORDER — POTASSIUM CHLORIDE 20 MEQ/1
20 TABLET, EXTENDED RELEASE ORAL
Status: DISCONTINUED | OUTPATIENT
Start: 2018-11-27 | End: 2018-11-27

## 2018-11-27 RX ORDER — SODIUM CHLORIDE 9 MG/ML
1000 INJECTION, SOLUTION INTRAVENOUS CONTINUOUS
Status: DISPENSED | OUTPATIENT
Start: 2018-11-27 | End: 2018-11-28

## 2018-11-27 RX ORDER — FUROSEMIDE 20 MG/1
20 TABLET ORAL
Status: DISCONTINUED | OUTPATIENT
Start: 2018-11-27 | End: 2018-11-27

## 2018-11-27 RX ORDER — AMIODARONE HYDROCHLORIDE 200 MG/1
200 TABLET ORAL
Status: DISCONTINUED | OUTPATIENT
Start: 2018-11-28 | End: 2018-12-02

## 2018-11-27 RX ORDER — SODIUM CHLORIDE 9 MG/ML
500 INJECTION, SOLUTION INTRAVENOUS ONCE
Status: COMPLETED | OUTPATIENT
Start: 2018-11-27 | End: 2018-11-27

## 2018-11-27 RX ADMIN — SODIUM CHLORIDE 500 ML: 9 INJECTION, SOLUTION INTRAVENOUS at 12:00

## 2018-11-27 RX ADMIN — SPIRONOLACTONE 25 MG: 25 TABLET ORAL at 06:02

## 2018-11-27 RX ADMIN — PRAVASTATIN SODIUM 20 MG: 20 TABLET ORAL at 17:20

## 2018-11-27 RX ADMIN — SODIUM CHLORIDE 1000 ML: 9 INJECTION, SOLUTION INTRAVENOUS at 20:14

## 2018-11-27 RX ADMIN — POTASSIUM CHLORIDE 20 MEQ: 1500 TABLET, EXTENDED RELEASE ORAL at 06:02

## 2018-11-27 RX ADMIN — TRIAMCINOLONE ACETONIDE: 0.25 CREAM TOPICAL at 12:00

## 2018-11-27 RX ADMIN — TRIAMCINOLONE ACETONIDE: 0.25 CREAM TOPICAL at 18:00

## 2018-11-27 RX ADMIN — TRIAMCINOLONE ACETONIDE: 0.25 CREAM TOPICAL at 06:07

## 2018-11-27 RX ADMIN — NYSTATIN: 100000 POWDER TOPICAL at 06:03

## 2018-11-27 RX ADMIN — NYSTATIN: 100000 POWDER TOPICAL at 12:00

## 2018-11-27 RX ADMIN — NYSTATIN: 100000 POWDER TOPICAL at 18:00

## 2018-11-27 RX ADMIN — STANDARDIZED SENNA CONCENTRATE AND DOCUSATE SODIUM 2 TABLET: 8.6; 5 TABLET, FILM COATED ORAL at 06:01

## 2018-11-27 RX ADMIN — RIVAROXABAN 20 MG: 20 TABLET, FILM COATED ORAL at 17:20

## 2018-11-27 RX ADMIN — AMIODARONE HYDROCHLORIDE 200 MG: 200 TABLET ORAL at 06:02

## 2018-11-27 RX ADMIN — LISINOPRIL 5 MG: 5 TABLET ORAL at 06:02

## 2018-11-27 ASSESSMENT — ENCOUNTER SYMPTOMS
ABDOMINAL PAIN: 0
CHEST TIGHTNESS: 0
LOSS OF CONSCIOUSNESS: 0
CHILLS: 0
WEAKNESS: 1
DIZZINESS: 1
DIARRHEA: 0
SHORTNESS OF BREATH: 0
TREMORS: 0
PALPITATIONS: 0
FEVER: 0
SHORTNESS OF BREATH: 1
BLOOD IN STOOL: 0
HEADACHES: 0

## 2018-11-27 ASSESSMENT — PAIN SCALES - GENERAL
PAINLEVEL_OUTOF10: 0
PAINLEVEL_OUTOF10: 1
PAINLEVEL_OUTOF10: 0

## 2018-11-27 NOTE — PROGRESS NOTES
Bedside report received. Pt A&Ox4. Complaints of some leg discomfort/swelling. RA. VSS. POC discussed with pt. Pt verbalizes understanding. Call light and belongings within reach. Bed locked and in lowest position. Alarm and fall precautions in place.

## 2018-11-27 NOTE — DISCHARGE PLANNING
"Anticipated Discharge Disposition: Home    Action: LSW spoke with patient at bedside to complete assessment. Per patient, he is living with his s/o in an apartment in [place]. Patient reports that she helps out financially and that his income is around $600 from his disability. LSW asked patient if he has difficulties with getting to Dr appointments or picking up Rx and patient stated \"no.\" Patient reports his 3 Rx he takes are covered through his insurance. LSW provided patient with information on MTM to help with transportation since patient uses RTC normally. Patient reports he is not on 02, does use a cane.     Barriers to Discharge: none    Plan: LSW to monitor for d/c instructions.     Care Transition Team Assessment    Information Source  Orientation : Oriented x 4  Information Given By: Patient  Informant's Name:  (Sebastián Castro)  Who is responsible for making decisions for patient? : Patient    Readmission Evaluation  Is this a readmission?: Yes - unplanned readmission    Elopement Risk  Legal Hold: No  Ambulatory or Self Mobile in Wheelchair: Yes  Disoriented: No  Psychiatric Symptoms: None  History of Wandering: No  Elopement this Admit: No  Vocalizing Wanting to Leave: No  Displays Behaviors, Body Language Wanting to Leave: No-Not at Risk for Elopement  Elopement Risk: Not at Risk for Elopement    Interdisciplinary Discharge Planning  Patient or legal guardian wants to designate a caregiver (see row info): No    Discharge Preparedness  What is your plan after discharge?: Home with help  What are your discharge supports?: Partner  Prior Functional Level: Independent with Activities of Daily Living (uses a cane)    Functional Assesment  Prior Functional Level: Independent with Activities of Daily Living (uses a cane)    Finances  Financial Barriers to Discharge: No  Prescription Coverage: Yes    Vision / Hearing Impairment  Vision Impairment : No  Hearing Impairment : No    Values / Beliefs / " Concerns  Values / Beliefs Concerns : No    Advance Directive  Advance Directive?: None  Advance Directive offered?: AD Booklet given    Domestic Abuse  Have you ever been the victim of abuse or violence?: No  Physical Abuse or Sexual Abuse: No  Verbal Abuse or Emotional Abuse: No  Possible Abuse Reported to:: Not Applicable    Psychological Assessment  History of Substance Abuse: None  History of Psychiatric Problems: No  Non-compliant with Treatment: Yes    Discharge Risks or Barriers  Discharge risks or barriers?: Non-adherence to medication or treatment    Anticipated Discharge Information  Anticipated discharge disposition: Home  Discharge Address:  ([address] [place] [phone])

## 2018-11-27 NOTE — PROGRESS NOTES
Cardiology Follow Up Progress Note    Date of Service  11/27/2018    Attending Physician  Lesley Fregoso M.D.    Chief Complaint/Reason for admission  Dyspnea, volume overload, a.fib with RVR     HPI  Sebastián Castro is a 53 y.o. male with past medical history significant for tachycardia induced cardiomyopathy, paroxysmal atrial fibrillation on DOAC and amiodarone, although has history of medication noncompliance, admitted 11/21/2018 with acute on chronic HF and volume overload.     Interim Events  11/27/18:  Patient has been aggressively diuresed with a weight loss of ~14kg since admission. His shortness of breath has improved, denies orthopnea, PND, WOODWARD. Continues to have occasional sharpshooting right chest pain, lasting a few seconds, last felt early this morning. Has good understanding of his atrial fibrillation and how it relates to his heart failure. Awaiting DCCV today.     11/28/18: Underwent successful DCCV on 11/26/18. Overnight has been experiencing dizziness upon standing, nursing reports + orthostatic hypotension. No other complaints or issues overnight.     Monitor: SR 73-78 with first degree AV block    Labs:  CBC Stable  Na 131, K 4.6, Bun 50, Cr 1.48, GFR 49    Review of Systems  Review of Systems   Constitutional: Negative for fever.   Respiratory: Positive for shortness of breath (Some with exertion). Negative for chest tightness.    Cardiovascular: Positive for leg swelling. Negative for chest pain and palpitations.   Gastrointestinal: Negative for abdominal pain and blood in stool.   Genitourinary: Negative for hematuria.   Musculoskeletal: Negative for gait problem.   Neurological: Positive for dizziness (With position change). Negative for syncope.   All other systems reviewed and are negative.      Vital signs in last 24 hours  Temp:  [36 °C (96.8 °F)-37.1 °C (98.8 °F)] 36.7 °C (98.1 °F)  Pulse:  [59-95] 72  Resp:  [14-22] 16  BP: ()/(38-81) 118/78    Physical Exam  Physical Exam    Constitutional: He is oriented to person, place, and time. He appears well-developed and well-nourished.   HENT:   Head: Normocephalic.   Eyes: EOM are normal.   Neck: No JVD present.   Cardiovascular: Normal rate, regular rhythm, normal heart sounds and intact distal pulses.    Pulmonary/Chest: Effort normal and breath sounds normal. No respiratory distress.   Abdominal: Soft. There is no tenderness.   Musculoskeletal: Normal range of motion. He exhibits edema (BLE).   Neurological: He is alert and oriented to person, place, and time.   Skin: Skin is warm and dry.   Psychiatric: He has a normal mood and affect. His behavior is normal. Thought content normal.   Nursing note and vitals reviewed.      Lab Review  Lab Results   Component Value Date/Time    WBC 7.5 11/24/2018 03:02 AM    RBC 4.48 (L) 11/24/2018 03:02 AM    HEMOGLOBIN 12.6 (L) 11/24/2018 03:02 AM    HEMATOCRIT 39.4 (L) 11/24/2018 03:02 AM    MCV 87.9 11/24/2018 03:02 AM    MCH 28.1 11/24/2018 03:02 AM    MCHC 32.0 (L) 11/24/2018 03:02 AM    MPV 10.5 11/24/2018 03:02 AM      Lab Results   Component Value Date/Time    SODIUM 131 (L) 11/27/2018 01:11 AM    POTASSIUM 4.6 11/27/2018 01:11 AM    CHLORIDE 95 (L) 11/27/2018 01:11 AM    CO2 27 11/27/2018 01:11 AM    GLUCOSE 107 (H) 11/27/2018 01:11 AM    BUN 50 (H) 11/27/2018 01:11 AM    CREATININE 1.48 (H) 11/27/2018 01:11 AM      Lab Results   Component Value Date/Time    ASTSGOT 23 11/21/2018 08:36 AM    ALTSGPT 19 11/21/2018 08:36 AM     Lab Results   Component Value Date/Time    CHOLSTRLTOT 104 11/22/2018 12:07 AM    LDL 60 11/22/2018 12:07 AM    HDL 27 (A) 11/22/2018 12:07 AM    TRIGLYCERIDE 87 11/22/2018 12:07 AM    TROPONINI <0.01 11/21/2018 06:22 PM             Cardiac Imaging and Procedures Review  EKG:  My personal interpretation of the EKG dated 11/23 shows atrial fibrillation with rate of 84.      Echocardiogram 11/22:   Left ventricular ejection fraction is visually estimated to be 60%.  Mild  concentric left ventricular hypertrophy.  Mild mitral annular calcifcation, mild MR.  Mild tricuspid regurgitation.  Estimated right ventricular systolic pressure  is 30 mmHg.     Imaging  Stress Test:  1/29/18  NM stress test: no ischemic T wave changes, normal stress ECG, no evidence of myocardial ischemia or old infarcts.     Assessment/Plan  1. HFpEF, LVEF 60%  -MPI on 1/29/18 showed no ischemic changes.   -Net output 15 L.  -Continue Lisinopril 5mg daily.  -Symptomatic orthostatic hypotension, may be dry.   -Discontinue Sprinolactone, lasix and KCL.  -500 cc NS bolus.     2. Volume Overload  -Given discoloration of lower legs, appears LE edema is multifactorial  -Consider wrapping lower extremities  -Torsemide held due to bump in Cr.   -Start Lasix 20 mg daily prn edema/weight gain or sob with KCL 20 mEq prn with lasix.      3. Atrial fibrillation  -s/p successful cardioversion 11/26  -On chronic anticoagulation with Xarelto 20 mg  -Amiodarone load prior to cardioversion completed.   -Decrease amiodarone to 200 mg daily.      4. Hypertension  - Controlled on current medication regimen     5. Hyperlipidemia  -Stable.  -LDL on 11/22/18 was 60.  -Continue Pravastatin 20 mg daily.      We will continue to follow alongside you in the care of this patient.  Please let us know if you have any questions or concerns.     Future Appointments  Date Time Provider Department Center   11/29/2018 8:30 AM Luis Anderson M.D. [facility] None   11/29/2018 11:30 AM [facility] EXAM 4 [facility] None   12/7/2018 1:15 PM Presley Camara M.D. [facility] None     Carly Peña APRN  Cardiology, [facility]

## 2018-11-27 NOTE — PROGRESS NOTES
Hospital Medicine Daily Progress Note    Date of Service  11/28/2018    Chief Complaint  53 y.o. male admitted 11/21/2018 with acute on chronic sCHF exacerbation    Hospital Course  See below    Interval Problem Update  sCHF-feeling better today, but restless, Now net negative 14.1 liters for admission. Denies any true SOB    Shaking episodes-none today, EEG still pending at this time.     A fib-TELE shows persistent a fib, HR 61-81. Denies any palpitations. Going for DCCV today.    Consultants/Specialty  Cards  neurology    Code Status  FCfC    Disposition  tele    Review of Systems  Review of Systems   Constitutional: Negative for chills.   HENT: Negative for hearing loss.    Respiratory: Negative for shortness of breath.    Cardiovascular: Positive for leg swelling (very small decrease). Negative for palpitations.   Gastrointestinal: Negative for diarrhea.   Neurological: Positive for weakness (approaching his baseline). Negative for dizziness, tremors, loss of consciousness and headaches.             All other systems reviewed and are negative.       Physical Exam  Temp:  [36.5 °C (97.7 °F)-36.8 °C (98.3 °F)] 36.6 °C (97.9 °F)  Pulse:  [61-75] 72  Resp:  [16-19] 18  BP: ()/(53-86) 140/84    Physical Exam   Constitutional: He is oriented to person, place, and time. He appears well-developed and well-nourished. No distress.   Much more coherent today   HENT:   Head: Normocephalic and atraumatic.   Mouth/Throat: No oropharyngeal exudate.   Eyes: Pupils are equal, round, and reactive to light. Right eye exhibits no discharge. Left eye exhibits no discharge.   Neck: Normal range of motion. Neck supple.   Cardiovascular: Normal rate, normal heart sounds and intact distal pulses.    No murmur heard.  Irregular irregular   Pulmonary/Chest: Effort normal. No stridor. He has rales (improved a bit).   Similar level of aeration today   Abdominal: Soft. Bowel sounds are normal. He exhibits no distension.   Obese    Musculoskeletal: Normal range of motion. He exhibits edema (3+ pitting edema B/L LE's, warm peripherally, no appreciable change noted). He exhibits no tenderness.   Neurological: He is alert and oriented to person, place, and time. No cranial nerve deficit.   Skin: Skin is warm and dry. No rash noted.   Redundant skin   Psychiatric: He has a normal mood and affect.   Nursing note and vitals reviewed.      Fluids    Intake/Output Summary (Last 24 hours) at 11/28/18 0942  Last data filed at 11/28/18 0900   Gross per 24 hour   Intake             1700 ml   Output             1600 ml   Net              100 ml       Laboratory      Recent Labs      11/26/18   0114  11/27/18   0111  11/28/18   0015   SODIUM  133*  131*  132*   POTASSIUM  4.1  4.6  4.2   CHLORIDE  95*  95*  99   CO2  30  27  23   GLUCOSE  137*  107*  121*   BUN  39*  50*  42*   CREATININE  1.26  1.48*  1.22   CALCIUM  9.3  9.4  9.3                   Imaging  CT-HEAD W/O   Final Result      No acute intracranial abnormality is identified.      Paranasal sinus disease.      CT-HEAD W/O   Final Result      No acute intracranial abnormality is identified.      Paranasal sinus disease.      EC-ECHOCARDIOGRAM COMPLETE W/ CONT   Final Result      EC-ECHOCARDIOGRAM COMPLETE W/O CONT         DX-CHEST-PORTABLE (1 VIEW)   Final Result      1.  Hypoinflation without other evidence for acute cardiopulmonary disease.   2.  Stable cardiomegaly.           Assessment/Plan  Acute on chronic diastolic heart failure (HCC)- (present on admission)   Assessment & Plan    -ECHO with EF 60%, most valves appear ok  -given diastolic dysfunction and A fib, likely an element of lost atrial kick  -11/26 DCCV for atrial fibrillation       Class 2 obesity due to excess calories without serious comorbidity with body mass index (BMI) of 37.0 to 37.9 in adult- (present on admission)   Assessment & Plan    Patient will need outpatient weight loss management program.  Body mass index is  37.97 kg/m².       Paroxysmal A-fib (HCC)- (present on admission)   Assessment & Plan    Continue at this point with rate control and anticoagulation.  Cardioversion on 11/26       Orthostatic hypotension   Assessment & Plan    Start on gentle IV fluids  Discontinue Flomax  We will repeat orthostatics in the morning     Seizure-like activity (HCC)   Assessment & Plan    -no recurrence in the past 48 hours  -no IV ativan PRN at this time, no AED's either  -EEG pending and should be done today  -Neurology following  -strongly suspect pseudo in origin, might need psych consult  -apparently did this at home 2 weeks prior but never sought medical attention     Syncope- (present on admission)   Assessment & Plan    -shaking is likely related to syncope, no h/o seizure D/O, CTH normal  -repeat CTH at 2 PM given on xarelto, repeat was normal  -no recurrence today  -likely related to orthostatics and slight over diuresis, doing better today  -no HR/rhythm issues during event  -bed alarm, monitor getting up slowly     Essential hypertension- (present on admission)   Assessment & Plan    Continue with blood pressure management optimization keep systolic blood pressure under 140 diastolic under 90.     Mixed hyperlipidemia- (present on admission)   Assessment & Plan    Low-fat low-cholesterol diet.  Continue with statin.  Lab Results   Component Value Date/Time    CHOLSTRLTOT 119 07/10/2018 02:30 AM    LDL 68 07/10/2018 02:30 AM    HDL 24 (A) 07/10/2018 02:30 AM    TRIGLYCERIDE 136 07/10/2018 02:30 AM               GERD (gastroesophageal reflux disease)- (present on admission)   Assessment & Plan    Continue with proton pump inhibitor.  Currently patient is not having heartburn.      Type 2 diabetes mellitus, without long-term current use of insulin, with peripheral neuropathy (HCC)- (present on admission)   Assessment & Plan    -accus with sliding scale coverage, sugars remain well controlled  -fasting blood sugar during RR  was normal  -diabetic diet  -diabetic education  -continue with oral antihyperglycemics  -monitor for hypoglycemic episodes and adjust control if he should get low     Hyponatremia- (present on admission)   Assessment & Plan    Likely due to overdiuresis  Started on IV fluids  Repeat BMP tomorrow          VTE prophylaxis: xarelto

## 2018-11-27 NOTE — PROGRESS NOTES
Upon initial assessment pt resting quietly in bed. Pt states he has 3/10 pain in his left pectoris, med given see MAR. Call light within reach, bed in low position.  Pt has no wants or needs at this time, will continue to monitor.

## 2018-11-27 NOTE — PROCEDURES
Electrical Cardioversion  Date/Time: 11/26/2018 9:33 PM  Performed by: GAVIN GOLDBERG  Authorized by: GAVIN GOLDBERG     Consent:     Consent obtained:  Written    Consent given by:  Patient    Risks discussed:  Induced arrhythmia and pain    Alternatives discussed:  No treatment, rate-control medication and delayed treatment  Sedation:     Patient sedated: Yes      Sedation type:  Per anesthesia  Pre-procedure details:     Cardioversion basis:  Elective    Rhythm:  Atrial fibrillation    Electrode placement:  Anterior-posterior    Anticoagulation status:  Rivaroxaban  Attempt one:     Cardioversion mode:  Synchronous    Waveform:  Biphasic    Shock (Joules):  200    Shock outcome:  Conversion to normal sinus rhythm  Post-procedure details:     Patient status:  Awake    Patient tolerance of procedure:  Tolerated well, no immediate complications

## 2018-11-28 LAB
ANION GAP SERPL CALC-SCNC: 10 MMOL/L (ref 0–11.9)
BUN SERPL-MCNC: 42 MG/DL (ref 8–22)
CALCIUM SERPL-MCNC: 9.3 MG/DL (ref 8.5–10.5)
CHLORIDE SERPL-SCNC: 99 MMOL/L (ref 96–112)
CO2 SERPL-SCNC: 23 MMOL/L (ref 20–33)
CREAT SERPL-MCNC: 1.22 MG/DL (ref 0.5–1.4)
GLUCOSE SERPL-MCNC: 121 MG/DL (ref 65–99)
POTASSIUM SERPL-SCNC: 4.2 MMOL/L (ref 3.6–5.5)
SODIUM SERPL-SCNC: 132 MMOL/L (ref 135–145)

## 2018-11-28 PROCEDURE — 97165 OT EVAL LOW COMPLEX 30 MIN: CPT

## 2018-11-28 PROCEDURE — 700105 HCHG RX REV CODE 258: Performed by: HOSPITALIST

## 2018-11-28 PROCEDURE — G8979 MOBILITY GOAL STATUS: HCPCS | Mod: CI

## 2018-11-28 PROCEDURE — 700102 HCHG RX REV CODE 250 W/ 637 OVERRIDE(OP): Performed by: HOSPITALIST

## 2018-11-28 PROCEDURE — A9270 NON-COVERED ITEM OR SERVICE: HCPCS | Performed by: NURSE PRACTITIONER

## 2018-11-28 PROCEDURE — 36415 COLL VENOUS BLD VENIPUNCTURE: CPT

## 2018-11-28 PROCEDURE — 80048 BASIC METABOLIC PNL TOTAL CA: CPT

## 2018-11-28 PROCEDURE — G8988 SELF CARE GOAL STATUS: HCPCS | Mod: CI

## 2018-11-28 PROCEDURE — 97162 PT EVAL MOD COMPLEX 30 MIN: CPT

## 2018-11-28 PROCEDURE — G8987 SELF CARE CURRENT STATUS: HCPCS | Mod: CJ

## 2018-11-28 PROCEDURE — A9270 NON-COVERED ITEM OR SERVICE: HCPCS | Performed by: HOSPITALIST

## 2018-11-28 PROCEDURE — 770020 HCHG ROOM/CARE - TELE (206)

## 2018-11-28 PROCEDURE — G8978 MOBILITY CURRENT STATUS: HCPCS | Mod: CJ

## 2018-11-28 PROCEDURE — 99232 SBSQ HOSP IP/OBS MODERATE 35: CPT | Performed by: INTERNAL MEDICINE

## 2018-11-28 PROCEDURE — 700102 HCHG RX REV CODE 250 W/ 637 OVERRIDE(OP): Performed by: NURSE PRACTITIONER

## 2018-11-28 PROCEDURE — 99232 SBSQ HOSP IP/OBS MODERATE 35: CPT | Performed by: HOSPITALIST

## 2018-11-28 RX ORDER — FUROSEMIDE 20 MG/1
20 TABLET ORAL PRN
Status: DISCONTINUED | OUTPATIENT
Start: 2018-11-28 | End: 2018-12-18 | Stop reason: HOSPADM

## 2018-11-28 RX ORDER — SODIUM CHLORIDE 9 MG/ML
INJECTION, SOLUTION INTRAVENOUS CONTINUOUS
Status: DISCONTINUED | OUTPATIENT
Start: 2018-11-28 | End: 2018-11-29

## 2018-11-28 RX ORDER — POTASSIUM CHLORIDE 20 MEQ/1
20 TABLET, EXTENDED RELEASE ORAL PRN
Status: DISCONTINUED | OUTPATIENT
Start: 2018-11-28 | End: 2018-12-18 | Stop reason: HOSPADM

## 2018-11-28 RX ADMIN — TRIAMCINOLONE ACETONIDE: 0.25 CREAM TOPICAL at 14:05

## 2018-11-28 RX ADMIN — RIVAROXABAN 20 MG: 20 TABLET, FILM COATED ORAL at 17:43

## 2018-11-28 RX ADMIN — NYSTATIN: 100000 POWDER TOPICAL at 14:05

## 2018-11-28 RX ADMIN — AMIODARONE HYDROCHLORIDE 200 MG: 200 TABLET ORAL at 05:54

## 2018-11-28 RX ADMIN — STANDARDIZED SENNA CONCENTRATE AND DOCUSATE SODIUM 2 TABLET: 8.6; 5 TABLET, FILM COATED ORAL at 05:55

## 2018-11-28 RX ADMIN — LISINOPRIL 5 MG: 5 TABLET ORAL at 05:54

## 2018-11-28 RX ADMIN — ACETAMINOPHEN 650 MG: 325 TABLET, FILM COATED ORAL at 12:30

## 2018-11-28 RX ADMIN — TRIAMCINOLONE ACETONIDE: 0.25 CREAM TOPICAL at 18:16

## 2018-11-28 RX ADMIN — SODIUM CHLORIDE: 9 INJECTION, SOLUTION INTRAVENOUS at 17:48

## 2018-11-28 RX ADMIN — NYSTATIN: 100000 POWDER TOPICAL at 18:16

## 2018-11-28 RX ADMIN — NYSTATIN: 100000 POWDER TOPICAL at 06:01

## 2018-11-28 RX ADMIN — TRIAMCINOLONE ACETONIDE: 0.25 CREAM TOPICAL at 06:02

## 2018-11-28 RX ADMIN — PRAVASTATIN SODIUM 20 MG: 20 TABLET ORAL at 17:43

## 2018-11-28 ASSESSMENT — COGNITIVE AND FUNCTIONAL STATUS - GENERAL
PERSONAL GROOMING: A LITTLE
MOVING TO AND FROM BED TO CHAIR: A LITTLE
CLIMB 3 TO 5 STEPS WITH RAILING: A LITTLE
SUGGESTED CMS G CODE MODIFIER MOBILITY: CJ
STANDING UP FROM CHAIR USING ARMS: A LITTLE
SUGGESTED CMS G CODE MODIFIER DAILY ACTIVITY: CJ
WALKING IN HOSPITAL ROOM: A LITTLE
MOBILITY SCORE: 20
HELP NEEDED FOR BATHING: A LITTLE
DRESSING REGULAR LOWER BODY CLOTHING: A LITTLE
TOILETING: A LITTLE
DAILY ACTIVITIY SCORE: 20

## 2018-11-28 ASSESSMENT — GAIT ASSESSMENTS
DEVIATION: DECREASED BASE OF SUPPORT;DECREASED HEEL STRIKE;DECREASED TOE OFF;OTHER (COMMENT)
DISTANCE (FEET): 22
ASSISTIVE DEVICE: SINGLE POINT CANE
GAIT LEVEL OF ASSIST: CONTACT GUARD ASSIST

## 2018-11-28 ASSESSMENT — ENCOUNTER SYMPTOMS
TREMORS: 0
WEAKNESS: 1
DIZZINESS: 1
DIARRHEA: 0
CHEST TIGHTNESS: 0
FEVER: 0
HEADACHES: 0
SHORTNESS OF BREATH: 0
LOSS OF CONSCIOUSNESS: 0
PALPITATIONS: 0
CHILLS: 0
ABDOMINAL PAIN: 0
BLOOD IN STOOL: 0
DIZZINESS: 0

## 2018-11-28 ASSESSMENT — PAIN SCALES - GENERAL
PAINLEVEL_OUTOF10: 0
PAINLEVEL_OUTOF10: 0
PAINLEVEL_OUTOF10: 4
PAINLEVEL_OUTOF10: 0

## 2018-11-28 ASSESSMENT — ACTIVITIES OF DAILY LIVING (ADL): TOILETING: INDEPENDENT

## 2018-11-28 NOTE — PROGRESS NOTES
Bedside report received from previous nurse regarding prior 12 hours.  POC reviewed with pt.  Pt denies pain.  A&Ox4.  White board updated.  Pt verbalizes understanding.  Call light within reach.

## 2018-11-28 NOTE — THERAPY
"Physical Therapy Evaluation completed.   Bed Mobility:  Supine to Sit: Supervised  Transfers: Sit to Stand: Supervised  Gait: Level Of Assist: Contact Guard Assist with Single Point Cane; would recommend FWW at this time; he is at increased risk for falls; see below      Plan of Care: Will benefit from Physical Therapy 3 times per week  Discharge Recommendations: Equipment: Front-Wheel Walker and Will Continue to Assess for Equipment Needs. See below    Pt presents to PT for risk reduction for LOB and falling. He is able to demonstrate short distance ambulation with SPC with CGA. However he demonstrates inconsistent foot placement and impaired proprioception with BLE's and is at increased risk for falls without additional skilled PT and/or appropriate use of AD (would rec FWW at this time). In addition, he was limited 2' to PT concerns with BP response (see activity tolerance below*). He will benefit from continued acute PT while here. Would recommend use of FWW during initial recovery and anticipate that pt will be functionally capable of dc to new apartment provided BP/medically stable. Would recommend continued skilled PT after dc to home for fall risk reduction and to wean from AD as able.     *- pt asymptomatic throughout; however BP response as follows and limited 2' to concerns: sitting /71 -> standing for ~1 min at 96/64 ->standing post ~22 ft ambulation standing  at 83/57 -> sitting recovery at 148/93    See \"Rehab Therapy-Acute\" Patient Summary Report for complete documentation.     "

## 2018-11-28 NOTE — ASSESSMENT & PLAN NOTE
Suspect autonomic dysfunction secondary to his diabetes.  No significant electrolyte abnormalities to suggest other etiologies.  - neurology evaluated, greatly appreciate (signed off)  - continue with midodrine and mestinon  - continue florinef at max dose of 0.3 mg daily (monitor K+ closely)  - possibly start on droxidopa outpatient with PCP, per neuro recs  - lifestyle modification such as increased water intake, abdominal or lower extremity compression (minimal improvement), head of bed to at least 30 degrees  -continue compression stockings and abdominal binder, get up slowly  Pyridostigmine dose increased from 60 mg to 90 mg on 12/13  Pt started on clonazepam on 12/12 by psychiatry but this does not seem to have changed patient's BP in any range  Cardiology re consulted 12/15: plan to restart amiodarone, monitor on tele, plan for tilt table test on 12/17, after discussion with patient will resume salt tabs (were stopped previously d/t worsening lower extremity edema)  12/16: Plan for tilt table test on Monday and possibly considering pacemaker.  12/17: Cardiology decided to take the patient to cardiac cath to rule out constrictive pericarditis.

## 2018-11-28 NOTE — CARE PLAN
Problem: Safety  Goal: Will remain free from injury  Outcome: PROGRESSING AS EXPECTED  Fall precautions in place. Bed in lowest position. Non-skid socks in place. Personal possessions within reach. Mobility sign on door. Bed-alarm on. Call light within reach. Pt educated regarding fall prevention and states understanding.

## 2018-11-28 NOTE — CARE PLAN
Problem: Safety  Goal: Will remain free from injury    Intervention: Provide assistance with mobility  BED ALARM ACTIVATED CALL BELL IN PLACE

## 2018-11-28 NOTE — CARE PLAN
Problem: Communication  Goal: The ability to communicate needs accurately and effectively will improve    Intervention: Reorient patient to environment as needed  POC REVIEWED WITH PATIENT WITH PATIENT VERBALIZING UNDERSTANDING

## 2018-11-28 NOTE — HEART FAILURE PROGRAM
Cardiovascular Nurse Navigator () Advanced Heart Failure Program Inpatient Progress Note:    Current plan per notes: replete fluid, hold diuresis, if orthostatic hypotension resolves, discharge today.     Currently scheduled HF f/u appointment:  Nov 29, 2018  8:30 AM PST  Established Patient with Luis Anderson M.D.  [facility] Med - Internal Medicine (--) [address] [place] [address]  [address]  [facility] [address]  [phone]       If it starts to look like patient won't discharge by noon today, this appointment should be pushed out to take place within seven calendar days of newly anticipated discharge date.     Hospital schedulers are here seven days a week, [phone] and can be reached at extension [phone], they will handle appointment adjustments for you if you call them!    Thank you and please call with questions Glenna STOVALL

## 2018-11-28 NOTE — PROGRESS NOTES
Cardiology Follow Up Progress Note    Date of Service  11/28/2018    Attending Physician  Lesley Fregoso M.D.    Chief Complaint/Reason for admission  Dyspnea, volume overload, a.fib with RVR     HPI  Sebastián Castro is a 53 y.o. male with past medical history significant for tachycardia induced cardiomyopathy, paroxysmal atrial fibrillation on DOAC and amiodarone, although has history of medication noncompliance, admitted 11/21/2018 with acute on chronic HF and volume overload.     Interim Events  11/26/18:  Patient has been aggressively diuresed with a weight loss of ~14kg since admission. His shortness of breath has improved, denies orthopnea, PND, [place]. Continues to have occasional sharpshooting right chest pain, lasting a few seconds, last felt early this morning. Has good understanding of his atrial fibrillation and how it relates to his heart failure. Awaiting DCCV today.      11/27/18: Underwent successful DCCV on 11/26/18. Overnight has been experiencing dizziness upon standing, nursing reports + orthostatic hypotension. No other complaints or issues overnight.    11/28/18: Resting in bed, feeling better. Anxious to go home. Patient has not ambulated from bed this morning. Denies dizziness, shortness of breath or chest pain.     Monitor: SB/SR 60-68, first degree AV block and not ectopy. Alarmed for sandie at 35, review of strip      Labs:   CBC Stable  Na 132, Bun 42, Cr 1.22 and GFR > 60    Review of Systems  Review of Systems   Constitutional: Negative for fever.   Respiratory: Negative for chest tightness and shortness of breath.    Cardiovascular: Positive for leg swelling. Negative for chest pain and palpitations.   Gastrointestinal: Negative for abdominal pain and blood in stool.   Genitourinary: Negative for hematuria.   Musculoskeletal: Negative for gait problem.   Neurological: Negative for dizziness and syncope.   All other systems reviewed and are negative.      Vital signs in last 24 hours  Temp:   [36.5 °C (97.7 °F)-36.8 °C (98.3 °F)] 36.6 °C (97.9 °F)  Pulse:  [61-75] 66  Resp:  [16-19] 18  BP: ()/(53-86) 131/86    Physical Exam  Physical Exam   Constitutional: He is oriented to person, place, and time. He appears well-developed and well-nourished.   HENT:   Head: Normocephalic.   Eyes: EOM are normal.   Neck: No JVD present.   Cardiovascular: Normal rate, regular rhythm, normal heart sounds and intact distal pulses.    Pulmonary/Chest: Effort normal and breath sounds normal. No respiratory distress.   Abdominal: Soft. There is no tenderness.   Musculoskeletal: Normal range of motion. He exhibits edema (2+ BLE).   Neurological: He is alert and oriented to person, place, and time.   Skin: Skin is warm and dry.   Psychiatric: He has a normal mood and affect. His behavior is normal. Thought content normal.   Nursing note and vitals reviewed.      Lab Review  Lab Results   Component Value Date/Time    WBC 7.5 11/24/2018 03:02 AM    RBC 4.48 (L) 11/24/2018 03:02 AM    HEMOGLOBIN 12.6 (L) 11/24/2018 03:02 AM    HEMATOCRIT 39.4 (L) 11/24/2018 03:02 AM    MCV 87.9 11/24/2018 03:02 AM    MCH 28.1 11/24/2018 03:02 AM    MCHC 32.0 (L) 11/24/2018 03:02 AM    MPV 10.5 11/24/2018 03:02 AM      Lab Results   Component Value Date/Time    SODIUM 132 (L) 11/28/2018 12:15 AM    POTASSIUM 4.2 11/28/2018 12:15 AM    CHLORIDE 99 11/28/2018 12:15 AM    CO2 23 11/28/2018 12:15 AM    GLUCOSE 121 (H) 11/28/2018 12:15 AM    BUN 42 (H) 11/28/2018 12:15 AM    CREATININE 1.22 11/28/2018 12:15 AM      Lab Results   Component Value Date/Time    ASTSGOT 23 11/21/2018 08:36 AM    ALTSGPT 19 11/21/2018 08:36 AM     Lab Results   Component Value Date/Time    CHOLSTRLTOT 104 11/22/2018 12:07 AM    LDL 60 11/22/2018 12:07 AM    HDL 27 (A) 11/22/2018 12:07 AM    TRIGLYCERIDE 87 11/22/2018 12:07 AM    TROPONINI <0.01 11/21/2018 06:22 PM             Cardiac Imaging and Procedures Review    Echocardiogram 11/22:   Left ventricular ejection  fraction is visually estimated to be 60%.  Mild concentric left ventricular hypertrophy.  Mild mitral annular calcifcation, mild MR.  Mild tricuspid regurgitation.  Estimated right ventricular systolic pressure  is 30 mmHg.     Imaging    Stress Test:  1/29/18  NM stress test: no ischemic T wave changes, normal stress ECG, no evidence of myocardial ischemia or old infarcts.     Assessment/Plan  1. HFpEF, LVEF 60%  -MPI on 1/29/18 showed no ischemic changes.   -Improved renal function and BP this am.   -Order placed for orthostatic BP to be re-checked.   -Net output 15.5 L.  -Continue Lisinopril 5mg daily.  -Lasix 20 mg prn daily SOB, edema or weight gain with KCL 20 mEq for home.       2. Volume Overload  -Given discoloration of lower legs, appears LE edema is multifactorial  -Prescribed Lasix 20 mg daily prn edema/weight gain or sob with KCL 20 mEq prn with lasix to go home with.      3. Atrial fibrillation  -S/P successful DCCV on 11/26/18.  -On chronic anticoagulation with Xarelto 20 mg  -Continue Amiodarone 200 mg daily.      4. Hypertension  - Controlled on current medication regimen     5. Hyperlipidemia  -Stable.  -LDL on 11/22/18 was 60.  -Continue Pravastatin 20 mg daily.      We will continue to follow alongside you in the care of this patient. Patient is stable from cardiology perspective for discharge if orthostatic hypotension has resolved and patient is no longer exepriencing dizziness with position change with close follow up as below. Please let us know if you have any questions or concerns.      Future Appointments  Date Time Provider Department Center   11/29/2018 8:30 AM Luis Anderson M.D. [facility] None   11/29/2018 11:30 AM [facility] EXAM 4 [facility] None   12/7/2018 10:20 AM Aaron Carrillo M.D. [facility] None        Carly RICKS  Cardiology, [facility]

## 2018-11-28 NOTE — PROGRESS NOTES
Hospital Medicine Daily Progress Note    Date of Service  11/28/2018    Chief Complaint  53 y.o. male admitted 11/21/2018 with acute on chronic sCHF exacerbation    Hospital Course  See below     Interval Problem Update  Continues to be orthostatic despite IVF yesterday, although degree of orthostasis improved   Able to work with PT/OT and recommend home health    Consultants/Specialty  Cards  neurology    Code Status  FCfC    Disposition  tele    Review of Systems  Review of Systems   Constitutional: Negative for chills.   HENT: Negative for hearing loss.    Respiratory: Negative for shortness of breath.    Cardiovascular: Negative for palpitations and leg swelling.   Gastrointestinal: Negative for diarrhea.   Neurological: Positive for dizziness (on standing) and weakness (approaching his baseline). Negative for tremors, loss of consciousness and headaches.             All other systems reviewed and are negative.       Physical Exam  Temp:  [36.5 °C (97.7 °F)-36.9 °C (98.5 °F)] 36.8 °C (98.2 °F)  Pulse:  [61-93] 80  Resp:  [16-19] 18  BP: ()/(53-86) 92/65    Physical Exam   Constitutional: He is oriented to person, place, and time. He appears well-developed and well-nourished. No distress.   HENT:   Head: Normocephalic and atraumatic.   Mouth/Throat: No oropharyngeal exudate.   Eyes: Pupils are equal, round, and reactive to light. Right eye exhibits no discharge. Left eye exhibits no discharge.   Neck: Normal range of motion. Neck supple.   Cardiovascular: Normal rate, normal heart sounds and intact distal pulses.  An irregularly irregular rhythm present.   No murmur heard.  Pulmonary/Chest: Effort normal and breath sounds normal. No stridor. He has no rales.   Abdominal: Soft. Bowel sounds are normal. He exhibits no distension.   Obese   Musculoskeletal: Normal range of motion. He exhibits edema. He exhibits no tenderness.   Neurological: He is alert and oriented to person, place, and time. No cranial nerve  deficit.   Skin: Skin is warm and dry. No rash noted.   Psychiatric: He has a normal mood and affect.   Nursing note and vitals reviewed.      Fluids    Intake/Output Summary (Last 24 hours) at 11/28/18 1919  Last data filed at 11/28/18 1504   Gross per 24 hour   Intake             2235 ml   Output             1800 ml   Net              435 ml       Laboratory      Recent Labs      11/26/18   0114  11/27/18   0111  11/28/18   0015   SODIUM  133*  131*  132*   POTASSIUM  4.1  4.6  4.2   CHLORIDE  95*  95*  99   CO2  30  27  23   GLUCOSE  137*  107*  121*   BUN  39*  50*  42*   CREATININE  1.26  1.48*  1.22   CALCIUM  9.3  9.4  9.3                   Imaging  CT-HEAD W/O   Final Result      No acute intracranial abnormality is identified.      Paranasal sinus disease.      CT-HEAD W/O   Final Result      No acute intracranial abnormality is identified.      Paranasal sinus disease.      EC-ECHOCARDIOGRAM COMPLETE W/ CONT   Final Result      EC-ECHOCARDIOGRAM COMPLETE W/O CONT         DX-CHEST-PORTABLE (1 VIEW)   Final Result      1.  Hypoinflation without other evidence for acute cardiopulmonary disease.   2.  Stable cardiomegaly.           Assessment/Plan  Acute on chronic diastolic heart failure (HCC)- (present on admission)   Assessment & Plan    -ECHO with EF 60%, most valves appear ok  -given diastolic dysfunction and A fib, likely an element of lost atrial kick  -11/26 DCCV for atrial fibrillation       Class 2 obesity due to excess calories without serious comorbidity with body mass index (BMI) of 37.0 to 37.9 in adult- (present on admission)   Assessment & Plan    Patient will need outpatient weight loss management program.  Body mass index is 37.97 kg/m².       Paroxysmal A-fib (HCC)- (present on admission)   Assessment & Plan    Continue at this point with rate control and anticoagulation.  Cardioversion on 11/26       Orthostatic hypotension   Assessment & Plan    Start on gentle IV fluids  Discontinue  Flomax  We will repeat orthostatics in the morning, improved but still orthostatic     Seizure-like activity (HCC)   Assessment & Plan    -no recurrence in the past 48 hours  -no IV ativan PRN at this time, no AED's either  -EEG pending and should be done today  -Neurology following  -strongly suspect pseudo in origin, might need psych consult  -apparently did this at home 2 weeks prior but never sought medical attention     Syncope- (present on admission)   Assessment & Plan    -shaking is likely related to syncope, no h/o seizure D/O, CTH normal  -repeat CTH at 2 PM given on xarelto, repeat was normal  -no recurrence today  -likely related to orthostatics and slight over diuresis, doing better today  -no HR/rhythm issues during event  -bed alarm, monitor getting up slowly     Essential hypertension- (present on admission)   Assessment & Plan    Continue with blood pressure management optimization keep systolic blood pressure under 140 diastolic under 90.     Mixed hyperlipidemia- (present on admission)   Assessment & Plan    Low-fat low-cholesterol diet.  Continue with statin.  Lab Results   Component Value Date/Time    CHOLSTRLTOT 119 07/10/2018 02:30 AM    LDL 68 07/10/2018 02:30 AM    HDL 24 (A) 07/10/2018 02:30 AM    TRIGLYCERIDE 136 07/10/2018 02:30 AM               GERD (gastroesophageal reflux disease)- (present on admission)   Assessment & Plan    Continue with proton pump inhibitor.  Currently patient is not having heartburn.      Type 2 diabetes mellitus, without long-term current use of insulin, with peripheral neuropathy (HCC)- (present on admission)   Assessment & Plan    -accus with sliding scale coverage, sugars remain well controlled  -fasting blood sugar during RR was normal  -diabetic diet  -diabetic education  -continue with oral antihyperglycemics  -monitor for hypoglycemic episodes and adjust control if he should get low     Hyponatremia- (present on admission)   Assessment & Plan    Likely  due to overdiuresis  Started on IV fluids  Repeat BMP tomorrow          VTE prophylaxis: xarelto

## 2018-11-28 NOTE — PROGRESS NOTES
[facility] Medicine Daily Progress Note    Date of Service  11/27/2018    Chief Complaint  53 y.o. male admitted 11/21/2018 with acute on chronic sCHF exacerbation    Hospital Course  See below    Interval Problem Update  Significantly orthostatic.  Likely over diuresed, start gentle IV fluid hydration  Flomax discontinued  PT/OT pending, patient with recent history of recurrent falls  Other than feeling dizzy on standing patient denies any other complaints      Consultants/Specialty  Cards  neurology    Code Status  FCfC    Disposition  tele    Review of Systems  Review of Systems   Constitutional: Negative for chills.   HENT: Negative for hearing loss.    Respiratory: Negative for shortness of breath.    Cardiovascular: Negative for palpitations and leg swelling.   Gastrointestinal: Negative for diarrhea.   Neurological: Positive for dizziness (on standing) and weakness (approaching his baseline). Negative for tremors, loss of consciousness and headaches.             All other systems reviewed and are negative.       Physical Exam  Temp:  [36.6 °C (97.8 °F)-36.7 °C (98.1 °F)] 36.7 °C (98.1 °F)  Pulse:  [63-95] 66  Resp:  [16] 16  BP: ()/(38-86) 96/63    Physical Exam   Constitutional: He is oriented to person, place, and time. He appears well-developed and well-nourished. No distress.   HENT:   Head: Normocephalic and atraumatic.   Mouth/Throat: No oropharyngeal exudate.   Eyes: Pupils are equal, round, and reactive to light. Right eye exhibits no discharge. Left eye exhibits no discharge.   Neck: Normal range of motion. Neck supple.   Cardiovascular: Normal rate, normal heart sounds and intact distal pulses.  An irregularly irregular rhythm present.   No murmur heard.  Pulmonary/Chest: Effort normal and breath sounds normal. No stridor. He has no rales.   Abdominal: Soft. Bowel sounds are normal. He exhibits no distension.   Obese   Musculoskeletal: Normal range of motion. He exhibits edema. He exhibits no  tenderness.   Neurological: He is alert and oriented to person, place, and time. No cranial nerve deficit.   Skin: Skin is warm and dry. No rash noted.   Psychiatric: He has a normal mood and affect.   Nursing note and vitals reviewed.      Fluids    Intake/Output Summary (Last 24 hours) at 11/27/18 2124  Last data filed at 11/27/18 2000   Gross per 24 hour   Intake              500 ml   Output              725 ml   Net             -225 ml       Laboratory      Recent Labs      11/25/18   0204  11/26/18   0114  11/27/18   0111   SODIUM  133*  133*  131*   POTASSIUM  4.0  4.1  4.6   CHLORIDE  95*  95*  95*   CO2  30  30  27   GLUCOSE  103*  137*  107*   BUN  30*  39*  50*   CREATININE  1.10  1.26  1.48*   CALCIUM  9.2  9.3  9.4                   Imaging  CT-HEAD W/O   Final Result      No acute intracranial abnormality is identified.      Paranasal sinus disease.      CT-HEAD W/O   Final Result      No acute intracranial abnormality is identified.      Paranasal sinus disease.      EC-ECHOCARDIOGRAM COMPLETE W/ CONT   Final Result      EC-ECHOCARDIOGRAM COMPLETE W/O CONT         DX-CHEST-PORTABLE (1 VIEW)   Final Result      1.  Hypoinflation without other evidence for acute cardiopulmonary disease.   2.  Stable cardiomegaly.           Assessment/Plan  Acute on chronic diastolic heart failure (HCC)- (present on admission)   Assessment & Plan    -ECHO with EF 60%, most valves appear ok  -given diastolic dysfunction and A fib, likely an element of lost atrial kick  -11/26 DCCV for atrial fibrillation       Class 2 obesity due to excess calories without serious comorbidity with body mass index (BMI) of 37.0 to 37.9 in adult- (present on admission)   Assessment & Plan    Patient will need outpatient weight loss management program.  Body mass index is 37.97 kg/m².       Paroxysmal A-fib (HCC)- (present on admission)   Assessment & Plan    Continue at this point with rate control and anticoagulation.  Cardioversion on  11/26       Orthostatic hypotension   Assessment & Plan    Start on gentle IV fluids  Discontinue Flomax  We will repeat orthostatics in the morning     Seizure-like activity (HCC)   Assessment & Plan    -no recurrence in the past 24 hours  -no IV ativan PRN at this time, no AED's either  -EEG pending  -Neurology following  -strongly suspect pseudo in origin, might need psych consult  -apparently did this at home 2 weeks prior but never sought medical attention     Syncope- (present on admission)   Assessment & Plan    -shaking is likely related to syncope, no h/o seizure D/O, CTH normal  -repeat CTH at 2 PM given on xarelto, repeat was normal  -no recurrence today  -likely related to orthostatics and slight over diuresis, doing better today  -no HR/rhythm issues during event  -bed alarm, monitor getting up slowly     Essential hypertension- (present on admission)   Assessment & Plan    Continue with blood pressure management optimization keep systolic blood pressure under 140 diastolic under 90.     Mixed hyperlipidemia- (present on admission)   Assessment & Plan    Low-fat low-cholesterol diet.  Continue with statin.  Lab Results   Component Value Date/Time    CHOLSTRLTOT 119 07/10/2018 02:30 AM    LDL 68 07/10/2018 02:30 AM    HDL 24 (A) 07/10/2018 02:30 AM    TRIGLYCERIDE 136 07/10/2018 02:30 AM               GERD (gastroesophageal reflux disease)- (present on admission)   Assessment & Plan    Continue with proton pump inhibitor.  Currently patient is not having heartburn.      Type 2 diabetes mellitus, without long-term current use of insulin, with peripheral neuropathy (HCC)- (present on admission)   Assessment & Plan    -accus with sliding scale coverage, sugars remain well controlled  -fasting blood sugar during RR was normal  -diabetic diet  -diabetic education  -continue with oral antihyperglycemics  -monitor for hypoglycemic episodes and adjust control if he should get low     Hyponatremia- (present on  admission)   Assessment & Plan    Likely due to overdiuresis  Started on IV fluids  Repeat BMP tomorrow          VTE prophylaxis: xarelto

## 2018-11-28 NOTE — THERAPY
"Occupational Therapy Evaluation completed.   Functional Status:  SPV for sit>Stand; CGA for grooming while standing; SPV for LB dressing; CGA For toilet transfer and chair transfer  Plan of Care: Will benefit from Occupational Therapy 3 times per week  Discharge Recommendations:  Equipment: Shower Chair. Recommend home health or outpatient transitional care services for continued occupational therapy services    See \"Rehab Therapy-Acute\" Patient Summary Report for complete documentation.    OT eval completed on 54 YO M admitted with shortness of breath and leg swelling. Pt limited due to poor standing tolerance, functional mobility, activity tolerance and impaired insight into current deficits. Pt agreeable to use FWW during tx session. Pt with heavy reliance on BUE with FWW during functional transfers and for steadying assistance while standing grooming. Pt with c/o dizziness after 1-2 min standing grooming and required seated rest break. Pt's BP monitored throughout tx session: 145/91 while seated; 100/79 with initial stand; 115/73 during seated rest break; 143/87 at end of tx session. Will continue to follow for acute OT services while in-house to increase independence with BADLs/functional transfers  "

## 2018-11-29 ENCOUNTER — APPOINTMENT (OUTPATIENT)
Dept: VASCULAR LAB | Facility: MEDICAL CENTER | Age: 53
End: 2018-11-29
Payer: MEDICAID

## 2018-11-29 ENCOUNTER — HOME HEALTH ADMISSION (OUTPATIENT)
Dept: HOME HEALTH SERVICES | Facility: HOME HEALTHCARE | Age: 53
End: 2018-11-29
Payer: MEDICAID

## 2018-11-29 ENCOUNTER — APPOINTMENT (OUTPATIENT)
Dept: INTERNAL MEDICINE | Facility: MEDICAL CENTER | Age: 53
End: 2018-11-29
Payer: MEDICAID

## 2018-11-29 PROCEDURE — 770020 HCHG ROOM/CARE - TELE (206)

## 2018-11-29 PROCEDURE — 99232 SBSQ HOSP IP/OBS MODERATE 35: CPT | Performed by: HOSPITALIST

## 2018-11-29 PROCEDURE — A9270 NON-COVERED ITEM OR SERVICE: HCPCS | Performed by: NURSE PRACTITIONER

## 2018-11-29 PROCEDURE — G8980 MOBILITY D/C STATUS: HCPCS | Mod: CI

## 2018-11-29 PROCEDURE — 700102 HCHG RX REV CODE 250 W/ 637 OVERRIDE(OP): Performed by: HOSPITALIST

## 2018-11-29 PROCEDURE — 97530 THERAPEUTIC ACTIVITIES: CPT

## 2018-11-29 PROCEDURE — 306313 ANTI-EMBOLISM STOCKINGS XXXLG REG: Performed by: HOSPITALIST

## 2018-11-29 PROCEDURE — A9270 NON-COVERED ITEM OR SERVICE: HCPCS | Performed by: HOSPITALIST

## 2018-11-29 PROCEDURE — 306310 ANTI-EMBOLISM STOCKINGS XXLRG REG: Performed by: HOSPITALIST

## 2018-11-29 PROCEDURE — 700102 HCHG RX REV CODE 250 W/ 637 OVERRIDE(OP): Performed by: NURSE PRACTITIONER

## 2018-11-29 PROCEDURE — G8979 MOBILITY GOAL STATUS: HCPCS | Mod: CI

## 2018-11-29 PROCEDURE — 97116 GAIT TRAINING THERAPY: CPT

## 2018-11-29 PROCEDURE — 99232 SBSQ HOSP IP/OBS MODERATE 35: CPT | Performed by: INTERNAL MEDICINE

## 2018-11-29 PROCEDURE — 700105 HCHG RX REV CODE 258: Performed by: HOSPITALIST

## 2018-11-29 RX ORDER — MIDODRINE HYDROCHLORIDE 5 MG/1
5 TABLET ORAL
Status: DISCONTINUED | OUTPATIENT
Start: 2018-11-29 | End: 2018-11-30

## 2018-11-29 RX ADMIN — RIVAROXABAN 20 MG: 20 TABLET, FILM COATED ORAL at 19:02

## 2018-11-29 RX ADMIN — NYSTATIN: 100000 POWDER TOPICAL at 05:23

## 2018-11-29 RX ADMIN — TRIAMCINOLONE ACETONIDE: 0.25 CREAM TOPICAL at 05:23

## 2018-11-29 RX ADMIN — SODIUM CHLORIDE: 9 INJECTION, SOLUTION INTRAVENOUS at 06:15

## 2018-11-29 RX ADMIN — NYSTATIN: 100000 POWDER TOPICAL at 12:01

## 2018-11-29 RX ADMIN — MIDODRINE HYDROCHLORIDE 5 MG: 5 TABLET ORAL at 19:02

## 2018-11-29 RX ADMIN — PRAVASTATIN SODIUM 20 MG: 20 TABLET ORAL at 19:02

## 2018-11-29 RX ADMIN — TRIAMCINOLONE ACETONIDE: 0.25 CREAM TOPICAL at 12:01

## 2018-11-29 RX ADMIN — MIDODRINE HYDROCHLORIDE 5 MG: 5 TABLET ORAL at 12:01

## 2018-11-29 RX ADMIN — AMIODARONE HYDROCHLORIDE 200 MG: 200 TABLET ORAL at 05:23

## 2018-11-29 RX ADMIN — LISINOPRIL 5 MG: 5 TABLET ORAL at 05:23

## 2018-11-29 RX ADMIN — NYSTATIN: 100000 POWDER TOPICAL at 19:02

## 2018-11-29 ASSESSMENT — ENCOUNTER SYMPTOMS
WEAKNESS: 1
PALPITATIONS: 0
LOSS OF CONSCIOUSNESS: 0
DIARRHEA: 0
SHORTNESS OF BREATH: 0
CHOKING: 0
TREMORS: 0
STRIDOR: 0
APNEA: 0
DIZZINESS: 1
HEADACHES: 0
COUGH: 0
CHEST TIGHTNESS: 0
WHEEZING: 0
CHILLS: 0

## 2018-11-29 ASSESSMENT — COGNITIVE AND FUNCTIONAL STATUS - GENERAL
WALKING IN HOSPITAL ROOM: A LITTLE
MOBILITY SCORE: 22
SUGGESTED CMS G CODE MODIFIER MOBILITY: CJ
CLIMB 3 TO 5 STEPS WITH RAILING: A LITTLE

## 2018-11-29 ASSESSMENT — PAIN SCALES - GENERAL
PAINLEVEL_OUTOF10: 0

## 2018-11-29 ASSESSMENT — GAIT ASSESSMENTS
DEVIATION: DECREASED BASE OF SUPPORT;BRADYKINETIC
GAIT LEVEL OF ASSIST: STAND BY ASSIST
ASSISTIVE DEVICE: SINGLE POINT CANE
DISTANCE (FEET): 150

## 2018-11-29 NOTE — PROGRESS NOTES
Cardiology Follow Up Progress Note    Date of Service  11/29/2018    Attending Physician  Sathish Patel M.D.    Reason for consultation   Dyspnea, volume overload      History of     Atrial fibrillation, hypertension, dyslipidemia, type 2 diabetes    Admitted for   Dyspnea, leg swelling, 40 pounds weight gain      Interim Events  11/29/18, positive orthostatic hypotension, Midodrine was initiated, no rhythm issues overnight maintaining normal sinus rhythm with first-degree AV block  11/26/18, successful DC CV  11/25/18, A. fib rate controlled, diuresing well   11/24/18, afib, rate controlled, diuresed well overnight, low suspicious for seizure activities, EEG pending   11/23/18, low potassium, K3.5, negative troponin,  in the setting of high BMI    11/22/18, echo revealed LVEF 60%, indeterminate diastolic function    Review of Systems  Review of Systems   Respiratory: Negative for apnea, cough, choking, chest tightness, shortness of breath, wheezing and stridor.    Cardiovascular: Positive for leg swelling. Negative for chest pain.       Vital signs in last 24 hours  Temp:  [36.4 °C (97.6 °F)-36.9 °C (98.5 °F)] 36.9 °C (98.5 °F)  Pulse:  [54-93] 54  Resp:  [16-18] 16  BP: ()/(53-85) 135/74    Physical Exam  Physical Exam   Constitutional: He is oriented to person, place, and time.   HENT:   Head: Normocephalic.   Eyes: Conjunctivae are normal.   Neck: No thyromegaly present.   Cardiovascular: Normal rate.  An irregularly irregular rhythm present.   Pulses:       Carotid pulses are 2+ on the right side, and 2+ on the left side.       Radial pulses are 2+ on the right side, and 2+ on the left side.   Pulmonary/Chest: He has no wheezes.   Abdominal: Soft.   Musculoskeletal: He exhibits edema.   Neurological: He is alert and oriented to person, place, and time.   Skin: Skin is warm and dry.       Lab Review  Lab Results   Component Value Date/Time    WBC 7.5 11/24/2018 03:02 AM    RBC 4.48 (L)  11/24/2018 03:02 AM    HEMOGLOBIN 12.6 (L) 11/24/2018 03:02 AM    HEMATOCRIT 39.4 (L) 11/24/2018 03:02 AM    MCV 87.9 11/24/2018 03:02 AM    MCH 28.1 11/24/2018 03:02 AM    MCHC 32.0 (L) 11/24/2018 03:02 AM    MPV 10.5 11/24/2018 03:02 AM      Lab Results   Component Value Date/Time    SODIUM 132 (L) 11/28/2018 12:15 AM    POTASSIUM 4.2 11/28/2018 12:15 AM    CHLORIDE 99 11/28/2018 12:15 AM    CO2 23 11/28/2018 12:15 AM    GLUCOSE 121 (H) 11/28/2018 12:15 AM    BUN 42 (H) 11/28/2018 12:15 AM    CREATININE 1.22 11/28/2018 12:15 AM      Lab Results   Component Value Date/Time    ASTSGOT 23 11/21/2018 08:36 AM    ALTSGPT 19 11/21/2018 08:36 AM     Lab Results   Component Value Date/Time    CHOLSTRLTOT 104 11/22/2018 12:07 AM    LDL 60 11/22/2018 12:07 AM    HDL 27 (A) 11/22/2018 12:07 AM    TRIGLYCERIDE 87 11/22/2018 12:07 AM    TROPONINI <0.01 11/21/2018 06:22 PM               Assessment    HFpEF  LVEF 60%  Obesity, BMI 38  Atrial fibrillation s/p successful DCCV 11/26/18   On chronic anticoagulation with xarelto 20 mg  Hypertension, well controlled   Hyperlipidemia, HDL 27, LDL 60      Plan  S/p successful DCCV 11/26/18  Maintaining normal sinus rhythm, on amiodarone 200 mg daily  Continue with oral anticoagulation with Xarelto 20 mg   Acute on chronic diastolic heart failure   I/O =- not recorded adequately   Standing weight 234# ( weight ranges from 220-240 # per patient report )  + orthostatic hypotension, /70 supine, 91/59 standing ( symptomatic ).  Midodrine 5 mg TID was initiated  Lisinopril was stopped  Will stop fluids  Compression stockings   .

## 2018-11-29 NOTE — HEART FAILURE PROGRAM
Cardiovascular Nurse Navigator () Advanced Heart Failure Program Inpatient Progress Note:    Late entry: I met with Sebastián (his preference to be addressed) at bedside in T 835-2 yesterday at 1030 and was accompanied by [employer] student, Rian.    Discussed and used teachback on the following topics:     Medications, weights, worsening signs and symptoms, diet and need to follow up and act on changes.      Sebastián was instructed that acting upon changes means calling [phone]. Sebastián owns a scale that is in good working order and is easy to use.     Sebastián was able to participate successfully in teachback on     Meds  Weights  Diet  Daily monitoring for worsening signs and symptoms  What to do about worsening signs and sypmtoms: CALL [phone]    Sebastián has agreed to sign up with the [employer] program for HF [phone]. I see that home health has also been recommended. These services can be given concurrently.    Sebastián gave me permission to share pertinent information with the [facility] in order for a follow up appointment to be established. Referral sent.    Thank you and please call GAIL Manzanares with questions M-F    Update for today, 11.29.18 0630: I have left a VM for hospital schedulers:    Sebastián's creatinine is down to 1.22 for today and his BP is 135/74 as of 0400. Therefore, based upon cardiology's note yesterday, I do anticipate discharge today (likely pending repeat of orthostatic vitals and MD assessment of symptoms).     Requested in VM that HS's try to push out patient's PCP appointment for today to take place within seven calendar days of today as his HF Program appt is one day too late. Also let them know that his AC clinic appt needs to be pushed out.    Addendum 1400: pt still with symptomatic orthostatic hypotension today. Midodrine initiated with possible DC tomorrow.    Dec 04, 2018 11:15 AM PST  Established Patient with V EXAM 4  [facility]  (--) [address] [address]  [phone]   Dec 06, 2018  3:15 PM PST  Established Patient with Luda Henderson M.D.  [facility] (--) [address] [place] [address]  [address]  [facility] [address]  [phone]      Dec 07, 2018 10:20 AM [facility]  Hospital Follow up with Aaron Carrillo M.D.  [facility] (--) [address] [place] [address]  [facility] [address]  [phone]

## 2018-11-29 NOTE — PROGRESS NOTES
Bedside report received from previous nurse regarding prior 12 hours.  Pt denies pain.  Bed locked in lowest position.  White board updated.  Call light within reach.

## 2018-11-29 NOTE — DISCHARGE PLANNING
Received Choice form at [phone]  Agency/Facility Name: [facility]  Referral sent per Choice form at [phone]

## 2018-11-29 NOTE — PROGRESS NOTES
Hospital Medicine Daily Progress Note    Date of Service  11/29/2018    Chief Complaint  53 y.o. male admitted 11/21/2018 with acute on chronic sCHF exacerbation    Hospital Course  53-year-old male admitted for acute on chronic heart failure.    Interval Problem Update  Continues to be orthostatic, did not respond to fluids tonight  Still feels dizzy when getting up  We will start patient on scheduled midodrine  Home health orders placed    Consultants/Specialty  Cards  neurology    Code Status  FCfC    Disposition  tele    Review of Systems  Review of Systems   Constitutional: Negative for chills.   HENT: Negative for hearing loss.    Respiratory: Negative for shortness of breath.    Cardiovascular: Negative for palpitations and leg swelling.   Gastrointestinal: Negative for diarrhea.   Neurological: Positive for dizziness (on standing) and weakness. Negative for tremors, loss of consciousness and headaches.             All other systems reviewed and are negative.       Physical Exam  Temp:  [36.3 °C (97.4 °F)-36.9 °C (98.5 °F)] 36.4 °C (97.6 °F)  Pulse:  [54-77] 69  Resp:  [16-18] 18  BP: ()/(59-82) 155/82    Physical Exam   Constitutional: He is oriented to person, place, and time. He appears well-developed and well-nourished. No distress.   HENT:   Head: Normocephalic and atraumatic.   Mouth/Throat: No oropharyngeal exudate.   Eyes: Pupils are equal, round, and reactive to light. Right eye exhibits no discharge. Left eye exhibits no discharge.   Neck: Normal range of motion. Neck supple.   Cardiovascular: Normal rate, normal heart sounds and intact distal pulses.  An irregularly irregular rhythm present.   No murmur heard.  Pulmonary/Chest: Effort normal and breath sounds normal. No stridor. He has no rales.   Abdominal: Soft. Bowel sounds are normal. He exhibits no distension.   Obese   Musculoskeletal: Normal range of motion. He exhibits edema. He exhibits no tenderness.   Neurological: He is alert and  oriented to person, place, and time. No cranial nerve deficit.   Skin: Skin is warm and dry. No rash noted.   Psychiatric: He has a normal mood and affect.   Nursing note and vitals reviewed.      Fluids    Intake/Output Summary (Last 24 hours) at 11/29/18 1811  Last data filed at 11/29/18 1300   Gross per 24 hour   Intake                0 ml   Output             2125 ml   Net            -2125 ml       Laboratory      Recent Labs      11/27/18   0111  11/28/18   0015   SODIUM  131*  132*   POTASSIUM  4.6  4.2   CHLORIDE  95*  99   CO2  27  23   GLUCOSE  107*  121*   BUN  50*  42*   CREATININE  1.48*  1.22   CALCIUM  9.4  9.3                   Imaging  CT-HEAD W/O   Final Result      No acute intracranial abnormality is identified.      Paranasal sinus disease.      CT-HEAD W/O   Final Result      No acute intracranial abnormality is identified.      Paranasal sinus disease.      EC-ECHOCARDIOGRAM COMPLETE W/ CONT   Final Result      EC-ECHOCARDIOGRAM COMPLETE W/O CONT         DX-CHEST-PORTABLE (1 VIEW)   Final Result      1.  Hypoinflation without other evidence for acute cardiopulmonary disease.   2.  Stable cardiomegaly.           Assessment/Plan  Acute on chronic diastolic heart failure (HCC)- (present on admission)   Assessment & Plan    -ECHO with EF 60%, most valves appear ok  -given diastolic dysfunction and A fib, likely an element of lost atrial kick  -11/26 DCCV for atrial fibrillation       Class 2 obesity due to excess calories without serious comorbidity with body mass index (BMI) of 37.0 to 37.9 in adult- (present on admission)   Assessment & Plan    Patient will need outpatient weight loss management program.  Body mass index is 37.97 kg/m².       Paroxysmal A-fib (HCC)- (present on admission)   Assessment & Plan    Continue at this point with rate control and anticoagulation.  Cardioversion on 11/26       Orthostatic hypotension   Assessment & Plan    Discontinue Flomax  no improvement with IV  fluids  Started on midodrine     Seizure-like activity (HCC)   Assessment & Plan    -no recurrence in the past 48 hours  -no IV ativan PRN at this time, no AED's either  -EEG pending and should be done today  -Neurology following  -strongly suspect pseudo in origin, might need psych consult  -apparently did this at home 2 weeks prior but never sought medical attention     Syncope- (present on admission)   Assessment & Plan    -shaking is likely related to syncope, no h/o seizure D/O, CTH normal  -repeat CTH at 2 PM given on xarelto, repeat was normal  -no recurrence today  -likely related to orthostatics and slight over diuresis, doing better today  -no HR/rhythm issues during event  -bed alarm, monitor getting up slowly     Essential hypertension- (present on admission)   Assessment & Plan    Continue with blood pressure management optimization keep systolic blood pressure under 140 diastolic under 90.     Mixed hyperlipidemia- (present on admission)   Assessment & Plan    Low-fat low-cholesterol diet.  Continue with statin.  Lab Results   Component Value Date/Time    CHOLSTRLTOT 119 07/10/2018 02:30 AM    LDL 68 07/10/2018 02:30 AM    HDL 24 (A) 07/10/2018 02:30 AM    TRIGLYCERIDE 136 07/10/2018 02:30 AM               GERD (gastroesophageal reflux disease)- (present on admission)   Assessment & Plan    Continue with proton pump inhibitor.  Currently patient is not having heartburn.      Type 2 diabetes mellitus, without long-term current use of insulin, with peripheral neuropathy (HCC)- (present on admission)   Assessment & Plan    -accus with sliding scale coverage, sugars remain well controlled  -fasting blood sugar during RR was normal  -diabetic diet  -diabetic education  -continue with oral antihyperglycemics  -monitor for hypoglycemic episodes and adjust control if he should get low     Hyponatremia- (present on admission)   Assessment & Plan    Likely due to overdiuresis  Started on IV fluids  Repeat BMP  tomorrow          VTE prophylaxis: xarelto

## 2018-11-29 NOTE — FACE TO FACE
Face to Face Supporting Documentation - Home Health    The encounter with this patient was in whole or in part the primary reason for home health admission.    Date of encounter:   Patient:                    MRN:                       YOB: 2020  Sebastián Castro  [id number]  1965     Home health to see patient for:  Physical Therapy evaluation and treatment and Occupational therapy evaluation and treatment    Skilled need for:  Exacerbation of Chronic Disease State orthostatic hypotension, debility    Skilled nursing interventions to include:  Comment: none    Homebound status evidenced by:  Needs the assistance of another person in order to leave the home. Leaving home requires a considerable and taxing effort. There is a normal inability to leave the home.    Community Physician to provide follow up care: German Hniton M.D.     Optional Interventions? No      I certify the face to face encounter for this home health care referral meets the CMS requirements and the encounter/clinical assessment with the patient was, in whole, or in part, for the medical condition(s) listed above, which is the primary reason for home health care. Based on my clinical findings: the service(s) are medically necessary, support the need for home health care, and the homebound criteria are met.  I certify that this patient has had a face to face encounter by myself.  Lesley Fregoso M.D. - NPI: [id number]

## 2018-11-29 NOTE — DISCHARGE SUMMARY
Discharge Summary    CHIEF COMPLAINT ON ADMISSION  Chief Complaint   Patient presents with   • Chest Pain     pt c/o chronic right sided chest pain x several months worse over the last 2 weeks   • Shortness of Breath     chronic SOB with his chf, worse in past 2 weeks.    • Peripheral Edema     BLE edema worse in last two weeks aswel.        Reason for Admission  EMS      Admission Date  11/21/2018    CODE STATUS  Full Code    HPI & HOSPITAL COURSE  This is a 53 y.o. male here with acute exacerbation of diastolic heart failure. He was diuresed and his shortness of breath and edema slowly improved. He was over diuresed and developed symptomatic orthostatic hypotension. Possible a autonomic dysfunction due to his diabetes. Pt also with Afibb, cardiology was consulted pt ha cardioversion, and was converted into sinus rhythm and cardiology signed off, but he then developed sever postural hypotension, so cardiology was consulted. So he was then started on midodrin and also on florinef.  Pt also ad a cath done and was normal. His amiodarone was decreased and his lisinopril and spironolactone were d/c .  Pt symptoms have now improved, and he is doing better.  During this stay he also had several shaking like episodes during his hospitalization. Neurology was consulted and episode was also witnessed by neurologist and it was felt he had a non epileptic event. EEG was done which was negative, he was thought to have non epileptic seizures.    Pt was seen by physical therapy and home health has been recommended and arranged for pt.  He will be discharged home with home health.     The patient met 2-midnight criteria for an inpatient stay at the time of discharge.    Discharge Date  12/18/18    FOLLOW UP ITEMS POST DISCHARGE  Cardiology  PCP    DISCHARGE DIAGNOSES  Principal Problem:    Orthostatic hypotension POA: Yes  Active Problems:    Acute on chronic diastolic heart failure (HCC) POA: Yes    Psychogenic nonepileptic  seizure POA: Yes    Type 2 diabetes mellitus, without long-term current use of insulin, with peripheral neuropathy (HCC) POA: Yes    GERD (gastroesophageal reflux disease) (Chronic) POA: Yes    Mixed hyperlipidemia POA: Yes    Essential hypertension POA: Yes    Paroxysmal A-fib (HCC) POA: Yes    Syncope POA: Yes  Resolved Problems:    Hyponatremia POA: Yes      FOLLOW UP  Future Appointments  Date Time Provider Department Center   12/4/2018 11:15 AM [facility] EXAM 4 [facility] None   12/6/2018 3:15 PM Luda Henderson M.D. [facility] None   12/7/2018 10:20 AM Aaron Carrillo M.D. [facility] None     [address]  [place] [address]  [phone]          MEDICATIONS ON DISCHARGE     Medication List      START taking these medications      Instructions   fludrocortisone 0.1 MG Tabs  Start taking on:  12/19/2018  Commonly known as:  FLORINEF   Take 3 Tabs by mouth every morning.  Dose:  0.3 mg     FLUoxetine 20 MG Caps  Start taking on:  12/19/2018  Commonly known as:  PROZAC   Take 1 Cap by mouth every day.  Dose:  20 mg     midodrine 10 MG tablet  Commonly known as:  PROAMATINE   Take 1 Tab by mouth 3 times a day.  Dose:  10 mg     pyridostigmine 60 MG Tabs  Commonly known as:  MESTINON   Take 1.5 Tabs by mouth 3 times a day.  Dose:  90 mg     sodium chloride 1 GM Tabs  Commonly known as:  SALT   Take 1 Tab by mouth 3 times a day, with meals.  Dose:  1 g        CHANGE how you take these medications      Instructions   amiodarone 100 MG tablet  Start taking on:  12/19/2018  What changed:  · medication strength  · how much to take  · when to take this  Commonly known as:  PACERONE   Take 0.5 Tabs by mouth every day.  Dose:  50 mg     furosemide 20 MG Tabs  What changed:  · medication strength  · how much to take  · when to take this  · reasons to take this  Commonly known as:  LASIX   Take 1 Tab by mouth as needed (Edema, Shortness of breath or weight gain.).  Dose:  20 mg        CONTINUE taking these  medications      Instructions   metFORMIN 500 MG Tabs  Commonly known as:  GLUCOPHAGE   Take 500 mg by mouth 2 Times a Day.  Dose:  500 mg     metoprolol 25 MG Tabs  Commonly known as:  LOPRESSOR   Take 1 Tab by mouth 2 times a day.  Dose:  25 mg     potassium chloride 20 MEQ Pack  Commonly known as:  KLOR-CON   Take 10 mEq by mouth every day.  Dose:  10 mEq     pravastatin 20 MG Tabs  Commonly known as:  PRAVACHOL   Take 1 Tab by mouth every day.  Dose:  20 mg     rivaroxaban 20 MG Tabs tablet  Commonly known as:  XARELTO   Take 1 Tab by mouth with dinner.  Dose:  20 mg        STOP taking these medications    FLOMAX 0.4 MG capsule  Generic drug:  tamsulosin     lisinopril 5 MG Tabs  Commonly known as:  PRINIVIL     spironolactone 25 MG Tabs  Commonly known as:  ALDACTONE            Allergies  Allergies   Allergen Reactions   • Daptomycin Rash     Body rash  RXN=1/2018     • Pcn [Penicillins] Hives and Rash      Rash & hives  RXN=since a kid   • Unasyn [Ampicillin-Sulbactam Sodium] Hives, Itching and Swelling   • Vancomycin Rash     Red man syndrome       DIET  Orders Placed This Encounter   Procedures   • Diet Order Cardiac     Standing Status:   Standing     Number of Occurrences:   1     Order Specific Question:   Diet:     Answer:   Cardiac [6]       ACTIVITY  As tolerated.  Weight bearing as tolerated    CONSULTATIONS  Cardiology  Neurology    PROCEDURES  Angiogram     LABORATORY  Lab Results   Component Value Date    SODIUM 134 (L) 12/18/2018    POTASSIUM 3.8 12/18/2018    CHLORIDE 103 12/18/2018    CO2 26 12/18/2018    GLUCOSE 90 12/18/2018    BUN 24 (H) 12/18/2018    CREATININE 0.69 12/18/2018        Lab Results   Component Value Date    WBC 7.2 12/17/2018    HEMOGLOBIN 16.5 12/17/2018    HEMATOCRIT 49.4 12/17/2018    PLATELETCT 192 12/17/2018        Total time of the discharge process exceeds 42 minutes.

## 2018-11-29 NOTE — DISCHARGE PLANNING
ATTN: Case Management  RE: Referral for Home Health                We would like to take this opportunity to thank you for submitting a referral for your patient to continue care with [facility]. Our skilled team is dedicated to helping all patients recover and gain independence in the home setting.            As of 11/29/18, we have accepted the above patient into our service. A [facility] clinician will be out to see the patient within 48 hours to conduct our initial visit. If you have any questions or concerns regarding the patient’s transition to Home Health, please do not hesitate to contact us. We are open for referrals 7 days a week from 8AM to 5PM at [phone].      We look forward to collaborating with you,  [facility] Team

## 2018-11-29 NOTE — DISCHARGE PLANNING
Anticipated Discharge Disposition: Home with C    Action: LSW spoke with patient at bedside regarding d/c plan. Patient was previously on service with [facility] and signed choice to resume.   LSW faxed choice to [facility].    Barriers to Discharge: C acceptance    Plan: LSW to follow

## 2018-11-30 LAB
ANION GAP SERPL CALC-SCNC: 7 MMOL/L (ref 0–11.9)
BASOPHILS # BLD AUTO: 0.6 % (ref 0–1.8)
BASOPHILS # BLD: 0.05 K/UL (ref 0–0.12)
BUN SERPL-MCNC: 34 MG/DL (ref 8–22)
CALCIUM SERPL-MCNC: 9.4 MG/DL (ref 8.5–10.5)
CHLORIDE SERPL-SCNC: 101 MMOL/L (ref 96–112)
CO2 SERPL-SCNC: 22 MMOL/L (ref 20–33)
CREAT SERPL-MCNC: 0.96 MG/DL (ref 0.5–1.4)
EOSINOPHIL # BLD AUTO: 0.41 K/UL (ref 0–0.51)
EOSINOPHIL NFR BLD: 5.2 % (ref 0–6.9)
ERYTHROCYTE [DISTWIDTH] IN BLOOD BY AUTOMATED COUNT: 42 FL (ref 35.9–50)
GLUCOSE SERPL-MCNC: 96 MG/DL (ref 65–99)
HCT VFR BLD AUTO: 47.3 % (ref 42–52)
HGB BLD-MCNC: 14.9 G/DL (ref 14–18)
IMM GRANULOCYTES # BLD AUTO: 0.02 K/UL (ref 0–0.11)
IMM GRANULOCYTES NFR BLD AUTO: 0.3 % (ref 0–0.9)
LYMPHOCYTES # BLD AUTO: 2.74 K/UL (ref 1–4.8)
LYMPHOCYTES NFR BLD: 34.7 % (ref 22–41)
MCH RBC QN AUTO: 28.5 PG (ref 27–33)
MCHC RBC AUTO-ENTMCNC: 32.7 G/DL (ref 33.7–35.3)
MCV RBC AUTO: 87.2 FL (ref 81.4–97.8)
MONOCYTES # BLD AUTO: 0.76 K/UL (ref 0–0.85)
MONOCYTES NFR BLD AUTO: 9.6 % (ref 0–13.4)
NEUTROPHILS # BLD AUTO: 3.91 K/UL (ref 1.82–7.42)
NEUTROPHILS NFR BLD: 49.6 % (ref 44–72)
NRBC # BLD AUTO: 0 K/UL
NRBC BLD-RTO: 0 /100 WBC
PLATELET # BLD AUTO: 213 K/UL (ref 164–446)
PMV BLD AUTO: 10.3 FL (ref 9–12.9)
POTASSIUM SERPL-SCNC: 4.5 MMOL/L (ref 3.6–5.5)
RBC # BLD AUTO: 5.33 M/UL (ref 4.7–6.1)
SODIUM SERPL-SCNC: 130 MMOL/L (ref 135–145)
WBC # BLD AUTO: 7.9 K/UL (ref 4.8–10.8)

## 2018-11-30 PROCEDURE — 99232 SBSQ HOSP IP/OBS MODERATE 35: CPT | Performed by: INTERNAL MEDICINE

## 2018-11-30 PROCEDURE — 770020 HCHG ROOM/CARE - TELE (206)

## 2018-11-30 PROCEDURE — 306313 ANTI-EMBOLISM STOCKINGS XXXLG REG: Performed by: INTERNAL MEDICINE

## 2018-11-30 PROCEDURE — A9270 NON-COVERED ITEM OR SERVICE: HCPCS | Performed by: NURSE PRACTITIONER

## 2018-11-30 PROCEDURE — 85025 COMPLETE CBC W/AUTO DIFF WBC: CPT

## 2018-11-30 PROCEDURE — 99233 SBSQ HOSP IP/OBS HIGH 50: CPT | Performed by: INTERNAL MEDICINE

## 2018-11-30 PROCEDURE — 700102 HCHG RX REV CODE 250 W/ 637 OVERRIDE(OP): Performed by: HOSPITALIST

## 2018-11-30 PROCEDURE — A9270 NON-COVERED ITEM OR SERVICE: HCPCS | Performed by: INTERNAL MEDICINE

## 2018-11-30 PROCEDURE — A9270 NON-COVERED ITEM OR SERVICE: HCPCS | Performed by: HOSPITALIST

## 2018-11-30 PROCEDURE — 700102 HCHG RX REV CODE 250 W/ 637 OVERRIDE(OP): Performed by: INTERNAL MEDICINE

## 2018-11-30 PROCEDURE — 36415 COLL VENOUS BLD VENIPUNCTURE: CPT

## 2018-11-30 PROCEDURE — 700102 HCHG RX REV CODE 250 W/ 637 OVERRIDE(OP): Performed by: NURSE PRACTITIONER

## 2018-11-30 PROCEDURE — 80048 BASIC METABOLIC PNL TOTAL CA: CPT

## 2018-11-30 RX ORDER — MIDODRINE HYDROCHLORIDE 5 MG/1
10 TABLET ORAL
Status: DISCONTINUED | OUTPATIENT
Start: 2018-11-30 | End: 2018-12-03

## 2018-11-30 RX ORDER — FLUDROCORTISONE ACETATE 0.1 MG/1
0.1 TABLET ORAL EVERY MORNING
Status: DISCONTINUED | OUTPATIENT
Start: 2018-11-30 | End: 2018-12-02

## 2018-11-30 RX ADMIN — TRIAMCINOLONE ACETONIDE: 0.25 CREAM TOPICAL at 04:52

## 2018-11-30 RX ADMIN — NYSTATIN: 100000 POWDER TOPICAL at 18:21

## 2018-11-30 RX ADMIN — MIDODRINE HYDROCHLORIDE 10 MG: 5 TABLET ORAL at 11:30

## 2018-11-30 RX ADMIN — MIDODRINE HYDROCHLORIDE 10 MG: 5 TABLET ORAL at 17:25

## 2018-11-30 RX ADMIN — MIDODRINE HYDROCHLORIDE 5 MG: 5 TABLET ORAL at 04:52

## 2018-11-30 RX ADMIN — FLUDROCORTISONE ACETATE 0.1 MG: 0.1 TABLET ORAL at 11:30

## 2018-11-30 RX ADMIN — RIVAROXABAN 20 MG: 20 TABLET, FILM COATED ORAL at 17:25

## 2018-11-30 RX ADMIN — AMIODARONE HYDROCHLORIDE 200 MG: 200 TABLET ORAL at 04:53

## 2018-11-30 RX ADMIN — PRAVASTATIN SODIUM 20 MG: 20 TABLET ORAL at 17:25

## 2018-11-30 RX ADMIN — STANDARDIZED SENNA CONCENTRATE AND DOCUSATE SODIUM 2 TABLET: 8.6; 5 TABLET, FILM COATED ORAL at 04:51

## 2018-11-30 RX ADMIN — TRIAMCINOLONE ACETONIDE: 0.25 CREAM TOPICAL at 18:32

## 2018-11-30 RX ADMIN — TRIAMCINOLONE ACETONIDE: 0.25 CREAM TOPICAL at 11:31

## 2018-11-30 RX ADMIN — NYSTATIN: 100000 POWDER TOPICAL at 04:52

## 2018-11-30 RX ADMIN — NYSTATIN: 100000 POWDER TOPICAL at 11:30

## 2018-11-30 ASSESSMENT — ENCOUNTER SYMPTOMS
WHEEZING: 0
PALPITATIONS: 0
CONSTIPATION: 0
CHEST TIGHTNESS: 0
SEIZURES: 0
SORE THROAT: 0
EYE REDNESS: 0
DIZZINESS: 1
TREMORS: 0
WEAKNESS: 1
NAUSEA: 0
LOSS OF CONSCIOUSNESS: 0
EYE DISCHARGE: 0
DIARRHEA: 0
NERVOUS/ANXIOUS: 1
FEVER: 0
APNEA: 0
CHOKING: 0
VOMITING: 0
ABDOMINAL PAIN: 0
STRIDOR: 0
SHORTNESS OF BREATH: 0
HEADACHES: 0
COUGH: 0

## 2018-11-30 ASSESSMENT — PAIN SCALES - GENERAL
PAINLEVEL_OUTOF10: 0

## 2018-11-30 NOTE — DISCHARGE PLANNING
Anticipated Discharge Disposition: Home with HH     Action: Pt has been accepted by [facility]. LSW informed bedside RN.     Barriers to Discharge: None    Plan: No further social work needs at this time.

## 2018-11-30 NOTE — PROGRESS NOTES
Monitor summary:    Sinus rhythm- sinus sandie c PVCs; 1st degree AV block  HR: 50-60  0.32/0.12/0.44

## 2018-11-30 NOTE — PROGRESS NOTES
Bedside report received from previous nurse regarding prior 12 hours.  Pt sleeping peacefully.  Fall precautions in place.  No signs of pain or distress. Call light within reach.

## 2018-11-30 NOTE — CARE PLAN
Problem: Communication  Goal: The ability to communicate needs accurately and effectively will improve  Outcome: PROGRESSING AS EXPECTED      Problem: Safety  Goal: Will remain free from injury  Outcome: PROGRESSING AS EXPECTED      Problem: Knowledge Deficit  Goal: Knowledge of disease process/condition, treatment plan, diagnostic tests, and medications will improve  Outcome: PROGRESSING AS EXPECTED

## 2018-11-30 NOTE — THERAPY
"Physical Therapy Treatment completed.   Bed Mobility:  Supine to Sit: Supervised  Transfers: Sit to Stand: Stand by Assist  Gait: Level Of Assist: Stand by Assist with Single Point Cane       Plan of Care: Patient with no further skilled PT needs in the acute care setting at this time  Discharge Recommendations: Equipment: No Equipment Needed. Post-acute therapy Discharge to home with outpatient or home health for additional skilled therapy services.     See \"Rehab Therapy-Acute\" Patient Summary Report for complete documentation.       "

## 2018-11-30 NOTE — PROGRESS NOTES
Cardiology Follow Up Progress Note    Date of Service  11/30/2018    Attending Physician  Sathish Patel M.D.    Reason for consultation   Dyspnea, volume overload      History of     Atrial fibrillation, hypertension, dyslipidemia, type 2 diabetes    Admitted for   Dyspnea, leg swelling, 40 pounds weight gain      Interim Events    11/30/18,, no rhythm issues overnight, maintaining normal sinus rhythm on p.o. amiodarone, remains symptomatic with positive orthostatic hypotension, midodrine was increased, Florinef was added, will plan for DC in a.m. if asymptomatic  11/29/18, positive orthostatic hypotension, Midodrine was initiated, no rhythm issues overnight maintaining normal sinus rhythm with first-degree AV block  11/26/18, successful DC CV  11/25/18, A. fib rate controlled, diuresing well   11/24/18, afib, rate controlled, diuresed well overnight, low suspicious for seizure activities, EEG pending   11/23/18, low potassium, K3.5, negative troponin,  in the setting of high BMI    11/22/18, echo revealed LVEF 60%, indeterminate diastolic function    Review of Systems  Review of Systems   Respiratory: Negative for apnea, cough, choking, chest tightness, shortness of breath, wheezing and stridor.    Cardiovascular: Positive for leg swelling. Negative for chest pain.       Vital signs in last 24 hours  Temp:  [36.4 °C (97.5 °F)-37.1 °C (98.8 °F)] 36.7 °C (98.1 °F)  Pulse:  [57-83] 78  Resp:  [14-18] 14  BP: ()/(51-88) 84/51    Physical Exam  Physical Exam   Constitutional: He is oriented to person, place, and time.   HENT:   Head: Normocephalic.   Eyes: Conjunctivae are normal.   Neck: No thyromegaly present.   Cardiovascular: Normal rate.  An irregularly irregular rhythm present.   Pulses:       Carotid pulses are 2+ on the right side, and 2+ on the left side.       Radial pulses are 2+ on the right side, and 2+ on the left side.   Pulmonary/Chest: He has no wheezes.   Abdominal: Soft.    Musculoskeletal: He exhibits edema.   Neurological: He is alert and oriented to person, place, and time.   Skin: Skin is warm and dry.       Lab Review  Lab Results   Component Value Date/Time    WBC 7.9 11/30/2018 02:31 AM    RBC 5.33 11/30/2018 02:31 AM    HEMOGLOBIN 14.9 11/30/2018 02:31 AM    HEMATOCRIT 47.3 11/30/2018 02:31 AM    MCV 87.2 11/30/2018 02:31 AM    MCH 28.5 11/30/2018 02:31 AM    MCHC 32.7 (L) 11/30/2018 02:31 AM    MPV 10.3 11/30/2018 02:31 AM      Lab Results   Component Value Date/Time    SODIUM 130 (L) 11/30/2018 02:31 AM    POTASSIUM 4.5 11/30/2018 02:31 AM    CHLORIDE 101 11/30/2018 02:31 AM    CO2 22 11/30/2018 02:31 AM    GLUCOSE 96 11/30/2018 02:31 AM    BUN 34 (H) 11/30/2018 02:31 AM    CREATININE 0.96 11/30/2018 02:31 AM      Lab Results   Component Value Date/Time    ASTSGOT 23 11/21/2018 08:36 AM    ALTSGPT 19 11/21/2018 08:36 AM     Lab Results   Component Value Date/Time    CHOLSTRLTOT 104 11/22/2018 12:07 AM    LDL 60 11/22/2018 12:07 AM    HDL 27 (A) 11/22/2018 12:07 AM    TRIGLYCERIDE 87 11/22/2018 12:07 AM    TROPONINI <0.01 11/21/2018 06:22 PM               Assessment    HFpEF  LVEF 60%  Obesity, BMI 38  Atrial fibrillation s/p successful DCCV 11/26/18   On chronic anticoagulation with xarelto 20 mg  Hypertension, well controlled   Hyperlipidemia, HDL 27, LDL 60      Plan    Positive orthostatic hypotension despite midodrine 5 mg 3 times daily, midodrine was increased to 10 TID, Florinef was added, will evaluate blood pressure in a.m. and if stable will DC with HHN  S/p successful DCCV 11/26/18  Maintaining normal sinus rhythm, on amiodarone 200 mg daily  Continue with oral anticoagulation with Xarelto 20 mg   Acute on chronic diastolic heart failure   Standing weight 239# ( weight ranges from 220-240 # per patient report )  Compression stockings   .

## 2018-12-01 PROCEDURE — 99232 SBSQ HOSP IP/OBS MODERATE 35: CPT | Performed by: INTERNAL MEDICINE

## 2018-12-01 PROCEDURE — 306310 ANTI-EMBOLISM STOCKINGS XXLRG REG: Performed by: INTERNAL MEDICINE

## 2018-12-01 PROCEDURE — A9270 NON-COVERED ITEM OR SERVICE: HCPCS | Performed by: INTERNAL MEDICINE

## 2018-12-01 PROCEDURE — 700102 HCHG RX REV CODE 250 W/ 637 OVERRIDE(OP): Performed by: INTERNAL MEDICINE

## 2018-12-01 PROCEDURE — 770020 HCHG ROOM/CARE - TELE (206)

## 2018-12-01 PROCEDURE — 700102 HCHG RX REV CODE 250 W/ 637 OVERRIDE(OP): Performed by: NURSE PRACTITIONER

## 2018-12-01 PROCEDURE — A9270 NON-COVERED ITEM OR SERVICE: HCPCS | Performed by: HOSPITALIST

## 2018-12-01 PROCEDURE — 700102 HCHG RX REV CODE 250 W/ 637 OVERRIDE(OP): Performed by: HOSPITALIST

## 2018-12-01 PROCEDURE — A9270 NON-COVERED ITEM OR SERVICE: HCPCS | Performed by: NURSE PRACTITIONER

## 2018-12-01 RX ORDER — TRIAMCINOLONE ACETONIDE 5 MG/G
CREAM TOPICAL 2 TIMES DAILY
Status: DISCONTINUED | OUTPATIENT
Start: 2018-12-01 | End: 2018-12-18 | Stop reason: HOSPADM

## 2018-12-01 RX ORDER — HYDRALAZINE HYDROCHLORIDE 20 MG/ML
10 INJECTION INTRAMUSCULAR; INTRAVENOUS EVERY 4 HOURS PRN
Status: DISCONTINUED | OUTPATIENT
Start: 2018-12-01 | End: 2018-12-18 | Stop reason: HOSPADM

## 2018-12-01 RX ADMIN — STANDARDIZED SENNA CONCENTRATE AND DOCUSATE SODIUM 2 TABLET: 8.6; 5 TABLET, FILM COATED ORAL at 05:45

## 2018-12-01 RX ADMIN — MIDODRINE HYDROCHLORIDE 10 MG: 5 TABLET ORAL at 17:53

## 2018-12-01 RX ADMIN — MIDODRINE HYDROCHLORIDE 10 MG: 5 TABLET ORAL at 13:01

## 2018-12-01 RX ADMIN — FLUDROCORTISONE ACETATE 0.1 MG: 0.1 TABLET ORAL at 05:45

## 2018-12-01 RX ADMIN — PRAVASTATIN SODIUM 20 MG: 20 TABLET ORAL at 17:53

## 2018-12-01 RX ADMIN — NYSTATIN: 100000 POWDER TOPICAL at 05:45

## 2018-12-01 RX ADMIN — NYSTATIN: 100000 POWDER TOPICAL at 13:01

## 2018-12-01 RX ADMIN — RIVAROXABAN 20 MG: 20 TABLET, FILM COATED ORAL at 17:53

## 2018-12-01 RX ADMIN — AMIODARONE HYDROCHLORIDE 200 MG: 200 TABLET ORAL at 05:45

## 2018-12-01 RX ADMIN — TRIAMCINOLONE ACETONIDE: 5 CREAM TOPICAL at 17:53

## 2018-12-01 RX ADMIN — MIDODRINE HYDROCHLORIDE 10 MG: 5 TABLET ORAL at 05:45

## 2018-12-01 RX ADMIN — NYSTATIN: 100000 POWDER TOPICAL at 17:54

## 2018-12-01 RX ADMIN — TRIAMCINOLONE ACETONIDE: 5 CREAM TOPICAL at 05:45

## 2018-12-01 RX ADMIN — CLONIDINE HYDROCHLORIDE 0.1 MG: 0.1 TABLET ORAL at 22:13

## 2018-12-01 ASSESSMENT — ENCOUNTER SYMPTOMS
WEAKNESS: 1
CHOKING: 0
COUGH: 0
SEIZURES: 0
NERVOUS/ANXIOUS: 0
PALPITATIONS: 0
NAUSEA: 0
EYE DISCHARGE: 0
APNEA: 0
SHORTNESS OF BREATH: 0
DIZZINESS: 1
FEVER: 0
EYE REDNESS: 0
ABDOMINAL PAIN: 0
STRIDOR: 0
HEADACHES: 0
CHEST TIGHTNESS: 0
VOMITING: 0
TREMORS: 0
DIARRHEA: 0
LOSS OF CONSCIOUSNESS: 0
WHEEZING: 0
CONSTIPATION: 0
SORE THROAT: 0

## 2018-12-01 ASSESSMENT — PAIN SCALES - GENERAL
PAINLEVEL_OUTOF10: 0

## 2018-12-01 NOTE — PROGRESS NOTES
Hospital Medicine Daily Progress Note    Date of Service  11/30/2018    Chief Complaint  53 y.o. male admitted 11/21/2018 with acute on chronic sCHF exacerbation    Hospital Course  Presented with complaints of leg swelling, and 40 pound weight gain with bilateral leg edema, increased swelling consistent with exacerbation of congestive heart failure patient was diuresed but then had decreased sodium level and severe orthostasis there was concern for possible seizure activity however EEG was negative    Interval Problem Update  Remains orthostatic and symptomatic with systolic pressure dropping to 80 this morning  Seen with RN, discussed with cardiology  Medications changed and plan of care reviewed with the patient  Walker ordered for discharge-however he is not yet ready for discharge  Importance of compliance with all aspects of his treatment was discussed    Consultants/Specialty  Cards  neurology    Code Status  Full    Disposition  tele    Review of Systems  Review of Systems   Constitutional: Negative for fever.   HENT: Negative for congestion, hearing loss and sore throat.    Eyes: Negative for discharge and redness.   Respiratory: Negative for shortness of breath.    Cardiovascular: Positive for leg swelling (Improved but mild edema remains even with compression stockings). Negative for palpitations.   Gastrointestinal: Negative for abdominal pain, constipation, diarrhea, nausea and vomiting.   Genitourinary: Negative for dysuria.   Musculoskeletal: Negative for joint pain.   Neurological: Positive for dizziness (Orthostatic) and weakness. Negative for tremors, seizures, loss of consciousness and headaches.             Psychiatric/Behavioral: The patient is nervous/anxious.    All other systems reviewed and are negative.       Physical Exam  Temp:  [36.7 °C (98.1 °F)-37.1 °C (98.8 °F)] 36.9 °C (98.5 °F)  Pulse:  [57-78] 68  Resp:  [14-18] 17  BP: ()/(51-86) 136/72    Physical Exam   Constitutional: He  is oriented to person, place, and time. He appears well-developed and well-nourished. No distress.   HENT:   Head: Normocephalic and atraumatic.   Mouth/Throat: No oropharyngeal exudate.   Eyes: Pupils are equal, round, and reactive to light. Conjunctivae are normal. Right eye exhibits no discharge. Left eye exhibits no discharge.   Disconjugate gaze   Neck: Normal range of motion. Neck supple.   Cardiovascular: Normal rate, normal heart sounds and intact distal pulses.  An irregularly irregular rhythm present.   No murmur heard.  Pulmonary/Chest: Effort normal and breath sounds normal. No stridor. He has no rales.   Abdominal: Soft. Bowel sounds are normal. He exhibits no distension.   Obese   Musculoskeletal: Normal range of motion. He exhibits edema (1-2+). He exhibits no tenderness.   Neurological: He is alert and oriented to person, place, and time. No cranial nerve deficit.   Skin: Skin is warm and dry. No rash noted.   Psychiatric:   Unusual affect   Nursing note and vitals reviewed.      Fluids    Intake/Output Summary (Last 24 hours) at 11/30/18 2234  Last data filed at 11/30/18 0235   Gross per 24 hour   Intake                0 ml   Output              875 ml   Net             -875 ml       Laboratory  Recent Labs      11/30/18   0231   WBC  7.9   RBC  5.33   HEMOGLOBIN  14.9   HEMATOCRIT  47.3   MCV  87.2   MCH  28.5   MCHC  32.7*   RDW  42.0   PLATELETCT  213   MPV  10.3     Recent Labs      11/28/18   0015  11/30/18   0231   SODIUM  132*  130*   POTASSIUM  4.2  4.5   CHLORIDE  99  101   CO2  23  22   GLUCOSE  121*  96   BUN  42*  34*   CREATININE  1.22  0.96   CALCIUM  9.3  9.4                   Imaging  CT-HEAD W/O   Final Result      No acute intracranial abnormality is identified.      Paranasal sinus disease.      CT-HEAD W/O   Final Result      No acute intracranial abnormality is identified.      Paranasal sinus disease.      EC-ECHOCARDIOGRAM COMPLETE W/ CONT   Final Result      EC-ECHOCARDIOGRAM  COMPLETE W/O CONT         DX-CHEST-PORTABLE (1 VIEW)   Final Result      1.  Hypoinflation without other evidence for acute cardiopulmonary disease.   2.  Stable cardiomegaly.           Assessment/Plan  Acute on chronic diastolic heart failure (HCC)- (present on admission)   Assessment & Plan    -ECHO with EF 60%, most valves appear ok  -given diastolic dysfunction and A fib, likely an element of lost atrial kick  -11/26 DCCV for atrial fibrillation  Unfortunately remains orthostatic       Class 2 obesity due to excess calories without serious comorbidity with body mass index (BMI) of 37.0 to 37.9 in adult- (present on admission)   Assessment & Plan    Patient will need outpatient weight loss management program.  Body mass index is 37.97 kg/m².       Paroxysmal A-fib (HCC)- (present on admission)   Assessment & Plan    Rate controlled       Orthostatic hypotension   Assessment & Plan    As noted     Seizure-like activity (HCC)   Assessment & Plan    Suspect is hypoperfusion related due to orthostatic hypotension, no seizure activity on EEG  Non-epileptogenic seizures are a possibility as patient has an unusual mood and affect     Syncope- (present on admission)   Assessment & Plan    Remains orthostatic, probable autonomic dysfunction, mid a drain increased, Florinef added  Discharge on hold pending resolution or at least acceptable levels of hypotension  Today dropped to 80 systolic     Essential hypertension- (present on admission)   Assessment & Plan    May need to accept a higher baseline blood pressure in order to avoid the low standing blood pressures     Mixed hyperlipidemia- (present on admission)   Assessment & Plan    Low-fat low-cholesterol diet.  Continue with statin.  Lab Results   Component Value Date/Time    CHOLSTRLTOT 119 07/10/2018 02:30 AM    LDL 68 07/10/2018 02:30 AM    HDL 24 (A) 07/10/2018 02:30 AM    TRIGLYCERIDE 136 07/10/2018 02:30 AM               GERD (gastroesophageal reflux disease)-  (present on admission)   Assessment & Plan    Controlled on PPI     Type 2 diabetes mellitus, without long-term current use of insulin, with peripheral neuropathy (HCC)- (present on admission)   Assessment & Plan    Sugars are at goal and not requiring sliding scale     Hyponatremia- (present on admission)   Assessment & Plan    Continues, added Rafaelainef continue to monitor          VTE prophylaxis: xarelto

## 2018-12-01 NOTE — PROGRESS NOTES
Monitor summary:    Sinus rhythm, first degree heart block, no ectopy.  HR: 60-70s  0.32/0.10/0.42

## 2018-12-01 NOTE — PROGRESS NOTES
Cardiology Follow Up Progress Note    Date of Service  12/1/2018    Attending Physician  Sathish Patel M.D.    Reason for consultation   Dyspnea, volume overload      History of     Atrial fibrillation, hypertension, dyslipidemia, type 2 diabetes    Admitted for   Dyspnea, leg swelling, 40 pounds weight gain      Interim Events  12/1/18, remains symptomatic with dizziness, positive orthostatic hypotension, SBP dropped to 80's this am on standing ( supine   , no rhythm issues overnight, no dropping in heart rate on standing, weight stable  11/30/18,, no rhythm issues overnight, maintaining normal sinus rhythm on p.o. amiodarone, remains symptomatic with positive orthostatic hypotension, midodrine was increased, Florinef was added, will plan for DC in a.m. if asymptomatic  11/29/18, positive orthostatic hypotension, Midodrine was initiated, no rhythm issues overnight maintaining normal sinus rhythm with first-degree AV block  11/26/18, successful DC CV  11/25/18, A. fib rate controlled, diuresing well   11/24/18, afib, rate controlled, diuresed well overnight, low suspicious for seizure activities, EEG pending   11/23/18, low potassium, K3.5, negative troponin,  in the setting of high BMI    11/22/18, echo revealed LVEF 60%, indeterminate diastolic function    Review of Systems  Review of Systems   Respiratory: Negative for apnea, cough, choking, chest tightness, shortness of breath, wheezing and stridor.    Cardiovascular: Positive for leg swelling. Negative for chest pain.       Vital signs in last 24 hours  Temp:  [36.7 °C (98 °F)-37.2 °C (99 °F)] 36.7 °C (98 °F)  Pulse:  [61-74] 71  Resp:  [15-18] 18  BP: ()/(59-86) 111/73    Physical Exam  Physical Exam   Constitutional: He is oriented to person, place, and time.   HENT:   Head: Normocephalic.   Eyes: Conjunctivae are normal.   Neck: No thyromegaly present.   Cardiovascular: Normal rate.  An irregularly irregular rhythm present.    Pulses:       Carotid pulses are 2+ on the right side, and 2+ on the left side.       Radial pulses are 2+ on the right side, and 2+ on the left side.   Pulmonary/Chest: He has no wheezes.   Abdominal: Soft.   Musculoskeletal: He exhibits edema.   Neurological: He is alert and oriented to person, place, and time.   Skin: Skin is warm and dry.       Lab Review  Lab Results   Component Value Date/Time    WBC 7.9 11/30/2018 02:31 AM    RBC 5.33 11/30/2018 02:31 AM    HEMOGLOBIN 14.9 11/30/2018 02:31 AM    HEMATOCRIT 47.3 11/30/2018 02:31 AM    MCV 87.2 11/30/2018 02:31 AM    MCH 28.5 11/30/2018 02:31 AM    MCHC 32.7 (L) 11/30/2018 02:31 AM    MPV 10.3 11/30/2018 02:31 AM      Lab Results   Component Value Date/Time    SODIUM 130 (L) 11/30/2018 02:31 AM    POTASSIUM 4.5 11/30/2018 02:31 AM    CHLORIDE 101 11/30/2018 02:31 AM    CO2 22 11/30/2018 02:31 AM    GLUCOSE 96 11/30/2018 02:31 AM    BUN 34 (H) 11/30/2018 02:31 AM    CREATININE 0.96 11/30/2018 02:31 AM      Lab Results   Component Value Date/Time    ASTSGOT 23 11/21/2018 08:36 AM    ALTSGPT 19 11/21/2018 08:36 AM     Lab Results   Component Value Date/Time    CHOLSTRLTOT 104 11/22/2018 12:07 AM    LDL 60 11/22/2018 12:07 AM    HDL 27 (A) 11/22/2018 12:07 AM    TRIGLYCERIDE 87 11/22/2018 12:07 AM    TROPONINI <0.01 11/21/2018 06:22 PM               Assessment    HFpEF  LVEF 60%  Obesity, BMI 38  Atrial fibrillation s/p successful DCCV 11/26/18   On chronic anticoagulation with xarelto 20 mg  Hypertension, well controlled   Hyperlipidemia, HDL 27, LDL 60  Orthostatic hypotension       Plan    -Positive orthostatic hypotension despite increase in midodrine 10 mg TID and additional Florinef   -S/p successful DCCV 11/26/18  -Maintaining normal sinus rhythm, on amiodarone 200 mg daily  -Continue with oral anticoagulation with Xarelto 20 mg   -Acute on chronic diastolic heart failure   -Standing weight 238# ( weight ranges from 220-240 # per patient report  )  -Compression stockings   .

## 2018-12-02 LAB
ANION GAP SERPL CALC-SCNC: 6 MMOL/L (ref 0–11.9)
BUN SERPL-MCNC: 31 MG/DL (ref 8–22)
CALCIUM SERPL-MCNC: 9.4 MG/DL (ref 8.5–10.5)
CHLORIDE SERPL-SCNC: 101 MMOL/L (ref 96–112)
CO2 SERPL-SCNC: 24 MMOL/L (ref 20–33)
CREAT SERPL-MCNC: 0.91 MG/DL (ref 0.5–1.4)
GLUCOSE SERPL-MCNC: 101 MG/DL (ref 65–99)
POTASSIUM SERPL-SCNC: 4.3 MMOL/L (ref 3.6–5.5)
SODIUM SERPL-SCNC: 131 MMOL/L (ref 135–145)

## 2018-12-02 PROCEDURE — 700102 HCHG RX REV CODE 250 W/ 637 OVERRIDE(OP): Performed by: INTERNAL MEDICINE

## 2018-12-02 PROCEDURE — 700102 HCHG RX REV CODE 250 W/ 637 OVERRIDE(OP): Performed by: NURSE PRACTITIONER

## 2018-12-02 PROCEDURE — A9270 NON-COVERED ITEM OR SERVICE: HCPCS | Performed by: HOSPITALIST

## 2018-12-02 PROCEDURE — 700102 HCHG RX REV CODE 250 W/ 637 OVERRIDE(OP): Performed by: HOSPITALIST

## 2018-12-02 PROCEDURE — 700105 HCHG RX REV CODE 258: Performed by: HOSPITALIST

## 2018-12-02 PROCEDURE — 36415 COLL VENOUS BLD VENIPUNCTURE: CPT

## 2018-12-02 PROCEDURE — 700105 HCHG RX REV CODE 258: Performed by: NURSE PRACTITIONER

## 2018-12-02 PROCEDURE — A9270 NON-COVERED ITEM OR SERVICE: HCPCS | Performed by: INTERNAL MEDICINE

## 2018-12-02 PROCEDURE — A9270 NON-COVERED ITEM OR SERVICE: HCPCS | Performed by: NURSE PRACTITIONER

## 2018-12-02 PROCEDURE — 99233 SBSQ HOSP IP/OBS HIGH 50: CPT | Performed by: HOSPITALIST

## 2018-12-02 PROCEDURE — 80048 BASIC METABOLIC PNL TOTAL CA: CPT

## 2018-12-02 PROCEDURE — 770006 HCHG ROOM/CARE - MED/SURG/GYN SEMI*

## 2018-12-02 PROCEDURE — 99232 SBSQ HOSP IP/OBS MODERATE 35: CPT | Performed by: INTERNAL MEDICINE

## 2018-12-02 RX ORDER — FLUDROCORTISONE ACETATE 0.1 MG/1
0.2 TABLET ORAL EVERY MORNING
Status: DISCONTINUED | OUTPATIENT
Start: 2018-12-03 | End: 2018-12-12

## 2018-12-02 RX ORDER — SODIUM CHLORIDE 9 MG/ML
INJECTION, SOLUTION INTRAVENOUS
Status: ACTIVE
Start: 2018-12-02 | End: 2018-12-03

## 2018-12-02 RX ORDER — SODIUM CHLORIDE 9 MG/ML
250 INJECTION, SOLUTION INTRAVENOUS ONCE
Status: COMPLETED | OUTPATIENT
Start: 2018-12-02 | End: 2018-12-02

## 2018-12-02 RX ORDER — FLUDROCORTISONE ACETATE 0.1 MG/1
0.1 TABLET ORAL ONCE
Status: COMPLETED | OUTPATIENT
Start: 2018-12-02 | End: 2018-12-02

## 2018-12-02 RX ORDER — SODIUM CHLORIDE 9 MG/ML
500 INJECTION, SOLUTION INTRAVENOUS ONCE
Status: COMPLETED | OUTPATIENT
Start: 2018-12-02 | End: 2018-12-02

## 2018-12-02 RX ORDER — SODIUM CHLORIDE 9 MG/ML
250 INJECTION, SOLUTION INTRAVENOUS ONCE
Status: COMPLETED | OUTPATIENT
Start: 2018-12-03 | End: 2018-12-03

## 2018-12-02 RX ADMIN — PRAVASTATIN SODIUM 20 MG: 20 TABLET ORAL at 17:28

## 2018-12-02 RX ADMIN — FLUDROCORTISONE ACETATE 0.1 MG: 0.1 TABLET ORAL at 14:56

## 2018-12-02 RX ADMIN — MIDODRINE HYDROCHLORIDE 10 MG: 5 TABLET ORAL at 17:28

## 2018-12-02 RX ADMIN — SODIUM CHLORIDE 250 ML: 9 INJECTION, SOLUTION INTRAVENOUS at 17:26

## 2018-12-02 RX ADMIN — RIVAROXABAN 20 MG: 20 TABLET, FILM COATED ORAL at 17:28

## 2018-12-02 RX ADMIN — MIDODRINE HYDROCHLORIDE 10 MG: 5 TABLET ORAL at 05:20

## 2018-12-02 RX ADMIN — TRIAMCINOLONE ACETONIDE: 5 CREAM TOPICAL at 18:00

## 2018-12-02 RX ADMIN — SODIUM CHLORIDE 500 ML: 9 INJECTION, SOLUTION INTRAVENOUS at 10:02

## 2018-12-02 RX ADMIN — FLUDROCORTISONE ACETATE 0.1 MG: 0.1 TABLET ORAL at 05:20

## 2018-12-02 RX ADMIN — MIDODRINE HYDROCHLORIDE 10 MG: 5 TABLET ORAL at 12:29

## 2018-12-02 RX ADMIN — TRIAMCINOLONE ACETONIDE: 5 CREAM TOPICAL at 05:21

## 2018-12-02 RX ADMIN — NYSTATIN: 100000 POWDER TOPICAL at 08:15

## 2018-12-02 RX ADMIN — NYSTATIN: 100000 POWDER TOPICAL at 18:10

## 2018-12-02 RX ADMIN — NYSTATIN: 100000 POWDER TOPICAL at 12:29

## 2018-12-02 RX ADMIN — AMIODARONE HYDROCHLORIDE 200 MG: 200 TABLET ORAL at 05:20

## 2018-12-02 ASSESSMENT — ENCOUNTER SYMPTOMS
FEVER: 0
SORE THROAT: 0
CHOKING: 0
DIZZINESS: 1
HEADACHES: 0
STRIDOR: 0
SEIZURES: 0
LOSS OF CONSCIOUSNESS: 0
NAUSEA: 0
CHEST TIGHTNESS: 0
ABDOMINAL PAIN: 0
COUGH: 0
WHEEZING: 0
NERVOUS/ANXIOUS: 0
APNEA: 0
TREMORS: 0
DIARRHEA: 0
SHORTNESS OF BREATH: 0
PALPITATIONS: 0
WEAKNESS: 1
EYE REDNESS: 0
VOMITING: 0
EYE DISCHARGE: 0
CONSTIPATION: 0

## 2018-12-02 ASSESSMENT — PAIN SCALES - GENERAL
PAINLEVEL_OUTOF10: 0

## 2018-12-02 NOTE — PROGRESS NOTES
Cardiology Follow Up Progress Note    Date of Service  12/2/2018    Attending Physician  Sathish Patel M.D.    Reason for consultation   Dyspnea, volume overload      History of     Atrial fibrillation, hypertension, dyslipidemia, type 2 diabetes    Admitted for   Dyspnea, leg swelling, 40 pounds weight gain      Interim Events    12/2/18, maintaining normal sinus rhythm, remains orthostatic, symptomatic, discussed with neuro (no further recommendations)  12/1/18, remains symptomatic with dizziness, positive orthostatic hypotension, SBP dropped to 80's this am on standing ( supine   , no rhythm issues overnight, no dropping in heart rate on standing, weight stable  11/30/18,, no rhythm issues overnight, maintaining normal sinus rhythm on p.o. amiodarone, remains symptomatic with positive orthostatic hypotension, midodrine was increased, Florinef was added, will plan for DC in a.m. if asymptomatic  11/29/18, positive orthostatic hypotension, Midodrine was initiated, no rhythm issues overnight maintaining normal sinus rhythm with first-degree AV block  11/26/18, successful DC CV  11/25/18, A. fib rate controlled, diuresing well   11/24/18, afib, rate controlled, diuresed well overnight, low suspicious for seizure activities, EEG pending   11/23/18, low potassium, K3.5, negative troponin,  in the setting of high BMI    11/22/18, echo revealed LVEF 60%, indeterminate diastolic function    Review of Systems  Review of Systems   Respiratory: Negative for apnea, cough, choking, chest tightness, shortness of breath, wheezing and stridor.    Cardiovascular: Positive for leg swelling. Negative for chest pain.       Vital signs in last 24 hours  Temp:  [36.3 °C (97.3 °F)-37.1 °C (98.8 °F)] 36.6 °C (97.9 °F)  Pulse:  [58-65] 60  Resp:  [15-18] 15  BP: (110-163)/(64-88) 110/71    Physical Exam  Physical Exam   Constitutional: He is oriented to person, place, and time.   HENT:   Head: Normocephalic.   Eyes:  Conjunctivae are normal.   Neck: No thyromegaly present.   Cardiovascular: Normal rate and regular rhythm.    Pulses:       Carotid pulses are 2+ on the right side, and 2+ on the left side.       Radial pulses are 2+ on the right side, and 2+ on the left side.   Pulmonary/Chest: He has no wheezes.   Abdominal: Soft.   Musculoskeletal: He exhibits edema.   Neurological: He is alert and oriented to person, place, and time.   Skin: Skin is warm and dry.       Lab Review  Lab Results   Component Value Date/Time    WBC 7.9 11/30/2018 02:31 AM    RBC 5.33 11/30/2018 02:31 AM    HEMOGLOBIN 14.9 11/30/2018 02:31 AM    HEMATOCRIT 47.3 11/30/2018 02:31 AM    MCV 87.2 11/30/2018 02:31 AM    MCH 28.5 11/30/2018 02:31 AM    MCHC 32.7 (L) 11/30/2018 02:31 AM    MPV 10.3 11/30/2018 02:31 AM      Lab Results   Component Value Date/Time    SODIUM 131 (L) 12/02/2018 12:35 AM    POTASSIUM 4.3 12/02/2018 12:35 AM    CHLORIDE 101 12/02/2018 12:35 AM    CO2 24 12/02/2018 12:35 AM    GLUCOSE 101 (H) 12/02/2018 12:35 AM    BUN 31 (H) 12/02/2018 12:35 AM    CREATININE 0.91 12/02/2018 12:35 AM      Lab Results   Component Value Date/Time    ASTSGOT 23 11/21/2018 08:36 AM    ALTSGPT 19 11/21/2018 08:36 AM     Lab Results   Component Value Date/Time    CHOLSTRLTOT 104 11/22/2018 12:07 AM    LDL 60 11/22/2018 12:07 AM    HDL 27 (A) 11/22/2018 12:07 AM    TRIGLYCERIDE 87 11/22/2018 12:07 AM    TROPONINI <0.01 11/21/2018 06:22 PM               Assessment    HFpEF  LVEF 60%  Obesity, BMI 38  Atrial fibrillation s/p successful DCCV 11/26/18   On chronic anticoagulation with xarelto 20 mg  Hypertension, well controlled   Hyperlipidemia, HDL 27, LDL 60  Orthostatic hypotension       Plan    -Remains orthostatic and symptomatic despite increase in midodrine 10 mg TID and additional Florinef   -Trial 500 c bolus of fluids today and recheck for orthostatic hypotension  -S/p successful DCCV 11/26/18  -Maintaining normal sinus rhythm, on amiodarone 200 mg  daily.  will hold Amiodarone (16% chance of hypotension).  -Continue with oral anticoagulation with Xarelto 20 mg   -Acute on chronic diastolic heart failure   -Standing weight 235# ( weight ranges from 220-240 # per patient report )  -Compression stockings   -We will follow  .

## 2018-12-02 NOTE — CARE PLAN
Problem: Communication  Goal: The ability to communicate needs accurately and effectively will improve  Outcome: PROGRESSING AS EXPECTED  Pt able to communicate needs. Call light within reach. Educated to call as needed.     Problem: Safety  Goal: Will remain free from falls  Outcome: PROGRESSING AS EXPECTED  No falls noted today. Assisted by girlfriend and CNA, and used FWW during ambulation.

## 2018-12-02 NOTE — PROGRESS NOTES
Hospital Medicine Daily Progress Note    Date of Service  12/1/2018    Chief Complaint  53 y.o. male admitted 11/21/2018 with acute on chronic sCHF exacerbation    Hospital Course  Presented with complaints of leg swelling, and 40 pound weight gain with bilateral leg edema, increased swelling consistent with exacerbation of congestive heart failure patient was diuresed but then had decreased sodium level and severe orthostasis there was concern for possible seizure activity however EEG was negative    Interval Problem Update  Remains orthostatic and symptomatic with systolic pressure dropping to 87  Discussed w/ Pharmacy - no other suggestions  DIscussed w/ RN, patient    Consultants/Specialty  Cards  neurology    Code Status  Full    Disposition  tele    Review of Systems  Review of Systems   Constitutional: Negative for fever.   HENT: Negative for congestion, hearing loss and sore throat.    Eyes: Negative for discharge and redness.   Respiratory: Negative for shortness of breath.    Cardiovascular: Positive for leg swelling (same). Negative for palpitations.   Gastrointestinal: Negative for abdominal pain, constipation, diarrhea, nausea and vomiting.   Genitourinary: Negative for dysuria and urgency.   Musculoskeletal: Negative for joint pain.   Neurological: Positive for dizziness (Orthostatic) and weakness (improved). Negative for tremors, seizures, loss of consciousness and headaches.             Psychiatric/Behavioral: The patient is not nervous/anxious.         Physical Exam  Temp:  [36.6 °C (97.9 °F)-37.2 °C (99 °F)] 36.6 °C (97.9 °F)  Pulse:  [61-98] 65  Resp:  [16-18] 18  BP: ()/(56-88) 163/88    Physical Exam   Constitutional: He is oriented to person, place, and time. He appears well-developed and well-nourished. No distress.   HENT:   Head: Normocephalic and atraumatic.   Mouth/Throat: No oropharyngeal exudate.   Eyes: Pupils are equal, round, and reactive to light. Conjunctivae are normal. Right  eye exhibits no discharge. Left eye exhibits no discharge.   Neck: Normal range of motion. Neck supple.   Cardiovascular: Normal rate, normal heart sounds and intact distal pulses.  An irregularly irregular rhythm present.   No murmur heard.  Pulmonary/Chest: Effort normal and breath sounds normal. No stridor. He has no rales.   Abdominal: Soft. Bowel sounds are normal. He exhibits no distension.   Obese   Musculoskeletal: Normal range of motion. He exhibits edema (1-2+). He exhibits no tenderness.   Neurological: He is alert and oriented to person, place, and time. No cranial nerve deficit.   Skin: Skin is warm and dry. No rash noted.   Psychiatric:   Unusual affect   Nursing note and vitals reviewed.      Fluids    Intake/Output Summary (Last 24 hours) at 12/01/18 2137  Last data filed at 12/01/18 1300   Gross per 24 hour   Intake                0 ml   Output             1500 ml   Net            -1500 ml       Laboratory  Recent Labs      11/30/18   0231   WBC  7.9   RBC  5.33   HEMOGLOBIN  14.9   HEMATOCRIT  47.3   MCV  87.2   MCH  28.5   MCHC  32.7*   RDW  42.0   PLATELETCT  213   MPV  10.3     Recent Labs      11/30/18   0231   SODIUM  130*   POTASSIUM  4.5   CHLORIDE  101   CO2  22   GLUCOSE  96   BUN  34*   CREATININE  0.96   CALCIUM  9.4                   Imaging  CT-HEAD W/O   Final Result      No acute intracranial abnormality is identified.      Paranasal sinus disease.      CT-HEAD W/O   Final Result      No acute intracranial abnormality is identified.      Paranasal sinus disease.      EC-ECHOCARDIOGRAM COMPLETE W/ CONT   Final Result      EC-ECHOCARDIOGRAM COMPLETE W/O CONT         DX-CHEST-PORTABLE (1 VIEW)   Final Result      1.  Hypoinflation without other evidence for acute cardiopulmonary disease.   2.  Stable cardiomegaly.           Assessment/Plan  Acute on chronic diastolic heart failure (HCC)- (present on admission)   Assessment & Plan    -ECHO with EF 60%, most valves appear ok  -given  diastolic dysfunction and A fib, likely an element of lost atrial kick  -11/26 DCCV for atrial fibrillation  Unfortunately remains orthostatic       Class 2 obesity due to excess calories without serious comorbidity with body mass index (BMI) of 37.0 to 37.9 in adult- (present on admission)   Assessment & Plan    Patient will need outpatient weight loss management program.  Body mass index is 37.97 kg/m².       Paroxysmal A-fib (HCC)- (present on admission)   Assessment & Plan    Rate controlled       Orthostatic hypotension   Assessment & Plan    As noted     Seizure-like activity (HCC)   Assessment & Plan    Suspect is hypoperfusion related due to orthostatic hypotension, no seizure activity on EEG  Non-epileptogenic seizures are a possibility as patient has an unusual mood and affect     Syncope- (present on admission)   Assessment & Plan    Remains orthostatic, probable autonomic dysfunction, mid a drain increased, Florinef added  Discharge on hold pending resolution or at least acceptable levels of hypotension  Standing BP 87, symptomatic     Essential hypertension- (present on admission)   Assessment & Plan    May need to accept a higher baseline blood pressure in order to avoid the low standing blood pressures  Not currenty on antihypertensives, highest 160's     Mixed hyperlipidemia- (present on admission)   Assessment & Plan    Low-fat low-cholesterol diet.  Continue with statin.  Lab Results   Component Value Date/Time    CHOLSTRLTOT 119 07/10/2018 02:30 AM    LDL 68 07/10/2018 02:30 AM    HDL 24 (A) 07/10/2018 02:30 AM    TRIGLYCERIDE 136 07/10/2018 02:30 AM               GERD (gastroesophageal reflux disease)- (present on admission)   Assessment & Plan    Controlled on PPI     Type 2 diabetes mellitus, without long-term current use of insulin, with peripheral neuropathy (HCC)- (present on admission)   Assessment & Plan    Sugars are at goal and not requiring sliding scale     Hyponatremia- (present on  admission)   Assessment & Plan    Continues, added Janna continue to monitor          VTE prophylaxis: xarelto

## 2018-12-02 NOTE — PROGRESS NOTES
Hospital Medicine Daily Progress Note    Date of Service  12/2/2018    Chief Complaint  53 y.o. male admitted 11/21/2018 with acute on chronic sCHF exacerbation    Hospital Course  Presented with complaints of leg swelling, and 40 pound weight gain with bilateral leg edema, increased swelling consistent with exacerbation of congestive heart failure patient was diuresed but then had decreased sodium level and severe orthostasis there was concern for possible seizure activity however EEG was negative    Interval Problem Update  Remains orthostatic although SBP has not dropped below 100. He was asymptomatic this morning after fluid bolus but again became symptomatic and was about to fall upon reassessment  I discussed case with cardiology and will try another 500 cc bolus and consult neurology for any further recommendations  Discussed w/ Pharmacy - plan to increase florinef  DIscussed w/ RN, patient    Consultants/Specialty  Cards  neurology    Code Status  Full    Disposition  tele    Review of Systems  Review of Systems   Constitutional: Negative for fever.   HENT: Negative for congestion, hearing loss and sore throat.    Eyes: Negative for discharge and redness.   Respiratory: Negative for shortness of breath.    Cardiovascular: Positive for leg swelling (same). Negative for palpitations.   Gastrointestinal: Negative for abdominal pain, constipation, diarrhea, nausea and vomiting.   Genitourinary: Negative for dysuria and urgency.   Musculoskeletal: Negative for joint pain.   Neurological: Positive for dizziness (Orthostatic) and weakness (improved). Negative for tremors, seizures, loss of consciousness and headaches.             Psychiatric/Behavioral: The patient is not nervous/anxious.         Physical Exam  Temp:  [36.3 °C (97.3 °F)-37.1 °C (98.8 °F)] 36.6 °C (97.8 °F)  Pulse:  [58-76] 69  Resp:  [15-18] 17  BP: (102-163)/(64-88) 148/76    Physical Exam   Constitutional: He is oriented to person, place, and  time. He appears well-developed and well-nourished. No distress.   HENT:   Head: Normocephalic and atraumatic.   Mouth/Throat: No oropharyngeal exudate.   Eyes: Pupils are equal, round, and reactive to light. Conjunctivae are normal. Right eye exhibits no discharge. Left eye exhibits no discharge.   Neck: Normal range of motion. Neck supple.   Cardiovascular: Normal rate, normal heart sounds and intact distal pulses.  An irregularly irregular rhythm present.   No murmur heard.  Pulmonary/Chest: Effort normal and breath sounds normal. No stridor. He has no rales.   Abdominal: Soft. Bowel sounds are normal. He exhibits no distension.   Obese   Musculoskeletal: Normal range of motion. He exhibits edema (1-2+). He exhibits no tenderness.   Neurological: He is alert and oriented to person, place, and time. No cranial nerve deficit.   Skin: Skin is warm and dry. No rash noted.   Psychiatric:   Unusual affect   Nursing note and vitals reviewed.      Fluids    Intake/Output Summary (Last 24 hours) at 12/02/18 1547  Last data filed at 12/02/18 1200   Gross per 24 hour   Intake              800 ml   Output             1750 ml   Net             -950 ml       Laboratory  Recent Labs      11/30/18   0231   WBC  7.9   RBC  5.33   HEMOGLOBIN  14.9   HEMATOCRIT  47.3   MCV  87.2   MCH  28.5   MCHC  32.7*   RDW  42.0   PLATELETCT  213   MPV  10.3     Recent Labs      11/30/18   0231  12/02/18   0035   SODIUM  130*  131*   POTASSIUM  4.5  4.3   CHLORIDE  101  101   CO2  22  24   GLUCOSE  96  101*   BUN  34*  31*   CREATININE  0.96  0.91   CALCIUM  9.4  9.4                   Imaging  CT-HEAD W/O   Final Result      No acute intracranial abnormality is identified.      Paranasal sinus disease.      CT-HEAD W/O   Final Result      No acute intracranial abnormality is identified.      Paranasal sinus disease.      EC-ECHOCARDIOGRAM COMPLETE W/ CONT   Final Result      EC-ECHOCARDIOGRAM COMPLETE W/O CONT         DX-CHEST-PORTABLE (1 VIEW)    Final Result      1.  Hypoinflation without other evidence for acute cardiopulmonary disease.   2.  Stable cardiomegaly.           Assessment/Plan  Acute on chronic diastolic heart failure (HCC)- (present on admission)   Assessment & Plan    -ECHO with EF 60%, most valves appear ok  -given diastolic dysfunction and A fib, likely an element of lost atrial kick  -11/26 DCCV for atrial fibrillation  Unfortunately remains orthostatic       Class 2 obesity due to excess calories without serious comorbidity with body mass index (BMI) of 37.0 to 37.9 in adult- (present on admission)   Assessment & Plan    Patient will need outpatient weight loss management program.  Body mass index is 37.97 kg/m².       Paroxysmal A-fib (HCC)- (present on admission)   Assessment & Plan    Rate controlled       Orthostatic hypotension   Assessment & Plan    As noted     Seizure-like activity (HCC)   Assessment & Plan    Suspect is hypoperfusion related due to orthostatic hypotension, no seizure activity on EEG  Non-epileptogenic seizures are a possibility as patient has an unusual mood and affect     Syncope- (present on admission)   Assessment & Plan    Remains orthostatic, probable autonomic dysfunction, mid a drain increased, Florinef increased  Discharge on hold pending resolution or at least acceptable levels of hypotension  Standing , symptomatic     Essential hypertension- (present on admission)   Assessment & Plan    May need to accept a higher baseline blood pressure in order to avoid the low standing blood pressures  Not currenty on antihypertensives, highest 160's     Mixed hyperlipidemia- (present on admission)   Assessment & Plan    Low-fat low-cholesterol diet.  Continue with statin.  Lab Results   Component Value Date/Time    CHOLSTRLTOT 119 07/10/2018 02:30 AM    LDL 68 07/10/2018 02:30 AM    HDL 24 (A) 07/10/2018 02:30 AM    TRIGLYCERIDE 136 07/10/2018 02:30 AM               GERD (gastroesophageal reflux disease)-  (present on admission)   Assessment & Plan    Controlled on PPI     Type 2 diabetes mellitus, without long-term current use of insulin, with peripheral neuropathy (HCC)- (present on admission)   Assessment & Plan    Sugars are at goal and not requiring sliding scale     Hyponatremia- (present on admission)   Assessment & Plan    Continues, added Rafaelainef continue to monitor          VTE prophylaxis: xarelto

## 2018-12-03 PROCEDURE — 700102 HCHG RX REV CODE 250 W/ 637 OVERRIDE(OP): Performed by: PSYCHIATRY & NEUROLOGY

## 2018-12-03 PROCEDURE — A6250 SKIN SEAL PROTECT MOISTURIZR: HCPCS

## 2018-12-03 PROCEDURE — 700102 HCHG RX REV CODE 250 W/ 637 OVERRIDE(OP): Performed by: HOSPITALIST

## 2018-12-03 PROCEDURE — A9270 NON-COVERED ITEM OR SERVICE: HCPCS | Performed by: HOSPITALIST

## 2018-12-03 PROCEDURE — 700102 HCHG RX REV CODE 250 W/ 637 OVERRIDE(OP): Performed by: INTERNAL MEDICINE

## 2018-12-03 PROCEDURE — 770006 HCHG ROOM/CARE - MED/SURG/GYN SEMI*

## 2018-12-03 PROCEDURE — A9270 NON-COVERED ITEM OR SERVICE: HCPCS | Performed by: INTERNAL MEDICINE

## 2018-12-03 PROCEDURE — 700105 HCHG RX REV CODE 258: Performed by: HOSPITALIST

## 2018-12-03 PROCEDURE — A9270 NON-COVERED ITEM OR SERVICE: HCPCS | Performed by: PSYCHIATRY & NEUROLOGY

## 2018-12-03 PROCEDURE — 99232 SBSQ HOSP IP/OBS MODERATE 35: CPT | Performed by: INTERNAL MEDICINE

## 2018-12-03 PROCEDURE — 99233 SBSQ HOSP IP/OBS HIGH 50: CPT | Performed by: HOSPITALIST

## 2018-12-03 PROCEDURE — 99232 SBSQ HOSP IP/OBS MODERATE 35: CPT | Performed by: PSYCHIATRY & NEUROLOGY

## 2018-12-03 PROCEDURE — 99223 1ST HOSP IP/OBS HIGH 75: CPT | Performed by: INTERNAL MEDICINE

## 2018-12-03 RX ORDER — MIDODRINE HYDROCHLORIDE 5 MG/1
10 TABLET ORAL 3 TIMES DAILY
Status: DISCONTINUED | OUTPATIENT
Start: 2018-12-03 | End: 2018-12-18 | Stop reason: HOSPADM

## 2018-12-03 RX ORDER — AMIODARONE HYDROCHLORIDE 200 MG/1
100 TABLET ORAL
Status: DISCONTINUED | OUTPATIENT
Start: 2018-12-03 | End: 2018-12-03

## 2018-12-03 RX ORDER — PYRIDOSTIGMINE BROMIDE 60 MG/1
60 TABLET ORAL 3 TIMES DAILY
Status: DISCONTINUED | OUTPATIENT
Start: 2018-12-03 | End: 2018-12-13

## 2018-12-03 RX ORDER — MIDODRINE HYDROCHLORIDE 5 MG/1
10 TABLET ORAL 3 TIMES DAILY
Status: DISCONTINUED | OUTPATIENT
Start: 2018-12-04 | End: 2018-12-03

## 2018-12-03 RX ORDER — SODIUM CHLORIDE 9 MG/ML
INJECTION, SOLUTION INTRAVENOUS
Status: COMPLETED
Start: 2018-12-03 | End: 2018-12-03

## 2018-12-03 RX ORDER — MIDODRINE HYDROCHLORIDE 5 MG/1
10 TABLET ORAL 3 TIMES DAILY
Status: DISCONTINUED | OUTPATIENT
Start: 2018-12-03 | End: 2018-12-03

## 2018-12-03 RX ADMIN — NYSTATIN: 100000 POWDER TOPICAL at 11:40

## 2018-12-03 RX ADMIN — NYSTATIN: 100000 POWDER TOPICAL at 05:25

## 2018-12-03 RX ADMIN — MIDODRINE HYDROCHLORIDE 10 MG: 5 TABLET ORAL at 17:21

## 2018-12-03 RX ADMIN — NYSTATIN: 100000 POWDER TOPICAL at 17:21

## 2018-12-03 RX ADMIN — TRIAMCINOLONE ACETONIDE: 5 CREAM TOPICAL at 17:23

## 2018-12-03 RX ADMIN — SODIUM CHLORIDE 250 ML: 9 INJECTION, SOLUTION INTRAVENOUS at 05:25

## 2018-12-03 RX ADMIN — MIDODRINE HYDROCHLORIDE 10 MG: 5 TABLET ORAL at 05:30

## 2018-12-03 RX ADMIN — PYRIDOSTIGMINE BROMIDE 60 MG: 60 TABLET ORAL at 17:21

## 2018-12-03 RX ADMIN — TRIAMCINOLONE ACETONIDE: 5 CREAM TOPICAL at 05:24

## 2018-12-03 RX ADMIN — PRAVASTATIN SODIUM 20 MG: 20 TABLET ORAL at 17:20

## 2018-12-03 RX ADMIN — FLUDROCORTISONE ACETATE 0.2 MG: 0.1 TABLET ORAL at 05:24

## 2018-12-03 RX ADMIN — MIDODRINE HYDROCHLORIDE 10 MG: 5 TABLET ORAL at 14:25

## 2018-12-03 RX ADMIN — RIVAROXABAN 20 MG: 20 TABLET, FILM COATED ORAL at 17:21

## 2018-12-03 RX ADMIN — AMIODARONE HYDROCHLORIDE 100 MG: 200 TABLET ORAL at 09:16

## 2018-12-03 ASSESSMENT — ENCOUNTER SYMPTOMS
FEVER: 0
CONFUSION: 0
NUMBNESS: 0
SORE THROAT: 0
APNEA: 0
VOMITING: 0
WEAKNESS: 1
COUGH: 0
CHOKING: 0
ABDOMINAL PAIN: 0
EYE DISCHARGE: 0
SHORTNESS OF BREATH: 0
CHILLS: 0
DIZZINESS: 0
SEIZURES: 0
STRIDOR: 0
DIZZINESS: 1
WHEEZING: 0
TREMORS: 0
HEADACHES: 0
CHEST TIGHTNESS: 0
EYE REDNESS: 0
DIARRHEA: 0
PALPITATIONS: 0
NAUSEA: 0
CONSTIPATION: 0
LOSS OF CONSCIOUSNESS: 0
NERVOUS/ANXIOUS: 0

## 2018-12-03 ASSESSMENT — PAIN SCALES - GENERAL
PAINLEVEL_OUTOF10: 0

## 2018-12-03 NOTE — PROGRESS NOTES
Assumed care at 0700. Pt A&O x 4. Pt not on monitor; medical pt. First assessment completed, call light within reach, bed locked and in low position. All questions asked. Will continue to monitor.

## 2018-12-03 NOTE — PROGRESS NOTES
Hospital Medicine Daily Progress Note    Date of Service  12/3/2018    Chief Complaint  53 y.o. male admitted 11/21/2018 with acute on chronic sCHF exacerbation    Hospital Course  Presented with complaints of leg swelling, and 40 pound weight gain with bilateral leg edema, increased swelling consistent with exacerbation of congestive heart failure patient was diuresed but then had decreased sodium level and severe orthostasis there was concern for possible seizure activity however EEG was negative    Interval Problem Update  Still orthostatic.  on laying down and 89 on standing. Sees dots and begins to feel weak when standing.  I discussed case with neurology who recommends starting mestinon  Patient expresses frustration for same issue not being resolved    Consultants/Specialty  Cards  neurology    Code Status  Full    Disposition  tele    Review of Systems  Review of Systems   Constitutional: Negative for fever.   HENT: Negative for congestion, hearing loss and sore throat.    Eyes: Negative for discharge and redness.   Respiratory: Negative for shortness of breath.    Cardiovascular: Positive for leg swelling (same). Negative for palpitations.   Gastrointestinal: Negative for abdominal pain, constipation, diarrhea, nausea and vomiting.   Genitourinary: Negative for dysuria and urgency.   Musculoskeletal: Negative for joint pain.   Neurological: Positive for dizziness (Orthostatic) and weakness (improved). Negative for tremors, seizures, loss of consciousness and headaches.             Psychiatric/Behavioral: The patient is not nervous/anxious.         Physical Exam  Temp:  [36.4 °C (97.6 °F)-36.6 °C (97.8 °F)] 36.4 °C (97.6 °F)  Pulse:  [53-67] 66  Resp:  [16-17] 16  BP: ()/() 108/64    Physical Exam   Constitutional: He is oriented to person, place, and time. He appears well-developed and well-nourished. No distress.   HENT:   Head: Normocephalic and atraumatic.   Mouth/Throat: No  oropharyngeal exudate.   Eyes: Pupils are equal, round, and reactive to light. Conjunctivae are normal. Right eye exhibits no discharge. Left eye exhibits no discharge.   Neck: Normal range of motion. Neck supple.   Cardiovascular: Normal rate, normal heart sounds and intact distal pulses.  An irregularly irregular rhythm present.   No murmur heard.  Pulmonary/Chest: Effort normal and breath sounds normal. No stridor. He has no rales.   Abdominal: Soft. Bowel sounds are normal. He exhibits no distension.   Obese   Musculoskeletal: Normal range of motion. He exhibits edema (1-2+). He exhibits no tenderness.   Neurological: He is alert and oriented to person, place, and time. No cranial nerve deficit.   Skin: Skin is warm and dry. No rash noted.   Psychiatric:   Unusual affect   Nursing note and vitals reviewed.      Fluids    Intake/Output Summary (Last 24 hours) at 12/03/18 1710  Last data filed at 12/03/18 1614   Gross per 24 hour   Intake             1030 ml   Output             1225 ml   Net             -195 ml       Laboratory      Recent Labs      12/02/18   0035   SODIUM  131*   POTASSIUM  4.3   CHLORIDE  101   CO2  24   GLUCOSE  101*   BUN  31*   CREATININE  0.91   CALCIUM  9.4                   Imaging  CT-HEAD W/O   Final Result      No acute intracranial abnormality is identified.      Paranasal sinus disease.      CT-HEAD W/O   Final Result      No acute intracranial abnormality is identified.      Paranasal sinus disease.      EC-ECHOCARDIOGRAM COMPLETE W/ CONT   Final Result      EC-ECHOCARDIOGRAM COMPLETE W/O CONT         DX-CHEST-PORTABLE (1 VIEW)   Final Result      1.  Hypoinflation without other evidence for acute cardiopulmonary disease.   2.  Stable cardiomegaly.           Assessment/Plan  Orthostatic hypotension   Assessment & Plan    Continues despite midodrine 10 mg 3 times daily and [facility]  Neurology consulted and I discussed case with Dr. Saleh  Plan to start patient on Mestinon,  compression hose stockings, and patient encouraged to drink a glass of water prior to standing up  If patient is not tolerating Mestinon due to GI side effects then recommended to decrease dose  Neurology recommends patient follow-up with primary care provider to see if he can be started on droxidopa  Patient's amiodarone discontinued per neurology recommendations.  Cardiology aware     Acute on chronic diastolic heart failure (HCC)- (present on admission)   Assessment & Plan    -ECHO with EF 60%, most valves appear ok  -given diastolic dysfunction and A fib, likely an element of lost atrial kick  -11/26 DCCV for atrial fibrillation  Unfortunately remains orthostatic       Class 2 obesity due to excess calories without serious comorbidity with body mass index (BMI) of 37.0 to 37.9 in adult- (present on admission)   Assessment & Plan    Patient will need outpatient weight loss management program.  Body mass index is 37.97 kg/m².       Paroxysmal A-fib (HCC)- (present on admission)   Assessment & Plan    Rate controlled       Seizure-like activity (HCC)   Assessment & Plan    Suspect is hypoperfusion related due to orthostatic hypotension, no seizure activity on EEG  Non-epileptogenic seizures are a possibility as patient has an unusual mood and affect     Syncope- (present on admission)   Assessment & Plan    Remains orthostatic, probable autonomic dysfunction, mid a drain increased, Florinef increased  Discharge on hold pending resolution or at least acceptable levels of hypotension  Standing , symptomatic     Essential hypertension- (present on admission)   Assessment & Plan    May need to accept a higher baseline blood pressure in order to avoid the low standing blood pressures  Not currenty on antihypertensives, highest 160's     Mixed hyperlipidemia- (present on admission)   Assessment & Plan    Low-fat low-cholesterol diet.  Continue with statin.  Lab Results   Component Value Date/Time    CHOLSTRLTOT 119  07/10/2018 02:30 AM    LDL 68 07/10/2018 02:30 AM    HDL 24 (A) 07/10/2018 02:30 AM    TRIGLYCERIDE 136 07/10/2018 02:30 AM               GERD (gastroesophageal reflux disease)- (present on admission)   Assessment & Plan    Controlled on PPI     Type 2 diabetes mellitus, without long-term current use of insulin, with peripheral neuropathy (HCC)- (present on admission)   Assessment & Plan    Sugars are at goal and not requiring sliding scale     Hyponatremia- (present on admission)   Assessment & Plan    Continues, added Janna continue to monitor          VTE prophylaxis: xarelto

## 2018-12-03 NOTE — PROCEDURES
REFERRING PHYSICIAN: Aaron Carrillo    PREOPERATIVE DIAGNOSIS:  1. Atrial fibrillation     POSTOPERATIVE DIAGNOSIS:  1. Successful cardioversion from atrial fibrillation to sinus rhythm    DESCRIPTION OF PROCEDURE:    I have discussed the risks and benefits of electrical cardioversion as well as the procedure itself, rationale and appropriateness in detail with the patient today. Complications including but not limited to death, stroke, MI, ACLS, aspiration and complications related to anesthesia were explained to the patient. The potential outcomes associated with the procedure were also discussed at length. The patient agrees to proceed.    The patient was transported to the procedure area in the fasting state. The anticoagulation status of the patient was verified prior to the procedure. Anterior-posterior cardioversion pads were placed. With the assistance of anesthesia, the patient was sedated. Following achievement of adequate sedation, a single synchronized 200 J shock was administered with immediate reversion to sinus rhythm. Patient recovered well from anesthesia. No immediate complications were noted.    CONCLUSIONS:  1. Successful cardioversion from atrial fibrillation to sinus rhythm

## 2018-12-03 NOTE — PROGRESS NOTES
Cardiology Follow Up Progress Note    Date of Service  12/3/2018    Attending Physician  Sathish Patel M.D.    Reason for consultation   Dyspnea, volume overload      HPI  Sebastián Dempsey is 53 years old male presented with dyspnea and leg swelling.  With the weight gain of 40 pounds. Noted to be in acute on chronic severe diastolic heart failure. With complication of orthostatic hypotension.     Atrial fibrillation, hypertension, dyslipidemia, type 2 diabetes    Interim Events  12/3/18: Patient is resting in bed.  He has been ambulated today.  RN will take orthostatics.  Denies any chest pain, shortness of breath, dizziness or lightheadedness while at rest.  Vital signs stable.      Review of Systems  Review of Systems   Constitutional: Negative for chills and fever.   Respiratory: Negative for apnea, cough, choking, chest tightness, shortness of breath, wheezing and stridor.    Cardiovascular: Positive for leg swelling. Negative for chest pain.   Gastrointestinal: Negative for abdominal pain.   Neurological: Negative for dizziness and numbness.   Psychiatric/Behavioral: Negative for confusion. The patient is not nervous/anxious.        Vital signs in last 24 hours  Temp:  [36.5 °C (97.7 °F)-36.6 °C (97.8 °F)] 36.5 °C (97.7 °F)  Pulse:  [53-93] 53  Resp:  [16-17] 16  BP: ()/(58-94) 109/58    Physical Exam  Physical Exam   Constitutional: He is oriented to person, place, and time.   HENT:   Head: Normocephalic.   Eyes: Conjunctivae are normal.   Neck: No thyromegaly present.   Cardiovascular: Normal rate and regular rhythm.    Pulses:       Carotid pulses are 2+ on the right side, and 2+ on the left side.       Radial pulses are 2+ on the right side, and 2+ on the left side.   Pulmonary/Chest: He has no wheezes.   Abdominal: Soft.   Musculoskeletal: He exhibits edema.   Neurological: He is alert and oriented to person, place, and time.   Skin: Skin is warm and dry.       Lab Review  Lab Results   Component  Value Date/Time    WBC 7.9 11/30/2018 02:31 AM    RBC 5.33 11/30/2018 02:31 AM    HEMOGLOBIN 14.9 11/30/2018 02:31 AM    HEMATOCRIT 47.3 11/30/2018 02:31 AM    MCV 87.2 11/30/2018 02:31 AM    MCH 28.5 11/30/2018 02:31 AM    MCHC 32.7 (L) 11/30/2018 02:31 AM    MPV 10.3 11/30/2018 02:31 AM      Lab Results   Component Value Date/Time    SODIUM 131 (L) 12/02/2018 12:35 AM    POTASSIUM 4.3 12/02/2018 12:35 AM    CHLORIDE 101 12/02/2018 12:35 AM    CO2 24 12/02/2018 12:35 AM    GLUCOSE 101 (H) 12/02/2018 12:35 AM    BUN 31 (H) 12/02/2018 12:35 AM    CREATININE 0.91 12/02/2018 12:35 AM      Lab Results   Component Value Date/Time    ASTSGOT 23 11/21/2018 08:36 AM    ALTSGPT 19 11/21/2018 08:36 AM     Lab Results   Component Value Date/Time    CHOLSTRLTOT 104 11/22/2018 12:07 AM    LDL 60 11/22/2018 12:07 AM    HDL 27 (A) 11/22/2018 12:07 AM    TRIGLYCERIDE 87 11/22/2018 12:07 AM    TROPONINI <0.01 11/21/2018 06:22 PM             Echocardiography Laboratory  11/22/18  Prior echo done 07/09/18, EF now normal.  Quality improved with contrast.  Left ventricular ejection fraction is visually estimated to be 60%.  Mild concentric left ventricular hypertrophy.  Mild mitral annular calcifcation, mild MR.  Mild tricuspid regurgitation.  Estimated right ventricular systolic pressure  is 30 mmHg.    Assessment/ Plan    1. HFpEF  - LVEF 60%  -Standing weight 235# ( weight ranges from 220-240 # per patient report )  - due to orthostatic hypotension, unable to tolerate bb and ace/ arb    2. Atrial fibrillation:  - s/p successful DCCV 11/26/18  -Maintaining normal sinus rhythm, changed amiodarone 100 mg daily.    -Continue with oral anticoagulation with Xarelto 20 mg     3.  Orthostatic hypotension:  -Continue Florinef and Midodrin    4. Hypertension:  - controlled    5. Hyperlipidemia:  -Continue pravastatin 20 daily    Future Appointments  Date Time Provider Department Center   12/4/2018 11:15 AM [facility] EXAM 4 [facility] None   12/6/2018  3:15 PM Luda Henderson M.D. [facility] None   12/7/2018 10:20 AM Aaron Carrillo M.D. [facility] None     Cardiology will sign off, follow up as planned.

## 2018-12-04 ENCOUNTER — APPOINTMENT (OUTPATIENT)
Dept: VASCULAR LAB | Facility: MEDICAL CENTER | Age: 53
End: 2018-12-04
Payer: MEDICAID

## 2018-12-04 PROBLEM — E87.1 HYPONATREMIA: Status: RESOLVED | Noted: 2017-11-11 | Resolved: 2018-12-04

## 2018-12-04 LAB
ALBUMIN SERPL BCP-MCNC: 3.8 G/DL (ref 3.2–4.9)
ALBUMIN/GLOB SERPL: 1 G/DL
ALP SERPL-CCNC: 91 U/L (ref 30–99)
ALT SERPL-CCNC: 27 U/L (ref 2–50)
ANION GAP SERPL CALC-SCNC: 5 MMOL/L (ref 0–11.9)
AST SERPL-CCNC: 22 U/L (ref 12–45)
BILIRUB SERPL-MCNC: 0.5 MG/DL (ref 0.1–1.5)
BUN SERPL-MCNC: 28 MG/DL (ref 8–22)
CALCIUM SERPL-MCNC: 9.5 MG/DL (ref 8.5–10.5)
CHLORIDE SERPL-SCNC: 102 MMOL/L (ref 96–112)
CO2 SERPL-SCNC: 29 MMOL/L (ref 20–33)
CREAT SERPL-MCNC: 0.9 MG/DL (ref 0.5–1.4)
EKG IMPRESSION: NORMAL
ERYTHROCYTE [DISTWIDTH] IN BLOOD BY AUTOMATED COUNT: 41.3 FL (ref 35.9–50)
GLOBULIN SER CALC-MCNC: 3.7 G/DL (ref 1.9–3.5)
GLUCOSE BLD-MCNC: 108 MG/DL (ref 65–99)
GLUCOSE SERPL-MCNC: 99 MG/DL (ref 65–99)
HCT VFR BLD AUTO: 48 % (ref 42–52)
HGB BLD-MCNC: 15.8 G/DL (ref 14–18)
LACTATE BLD-SCNC: 1.1 MMOL/L (ref 0.5–2)
MAGNESIUM SERPL-MCNC: 1.9 MG/DL (ref 1.5–2.5)
MCH RBC QN AUTO: 28.2 PG (ref 27–33)
MCHC RBC AUTO-ENTMCNC: 32.9 G/DL (ref 33.7–35.3)
MCV RBC AUTO: 85.7 FL (ref 81.4–97.8)
PLATELET # BLD AUTO: 255 K/UL (ref 164–446)
PMV BLD AUTO: 10.3 FL (ref 9–12.9)
POTASSIUM SERPL-SCNC: 4.3 MMOL/L (ref 3.6–5.5)
PROT SERPL-MCNC: 7.5 G/DL (ref 6–8.2)
RBC # BLD AUTO: 5.6 M/UL (ref 4.7–6.1)
SODIUM SERPL-SCNC: 136 MMOL/L (ref 135–145)
TROPONIN I SERPL-MCNC: <0.01 NG/ML (ref 0–0.04)
WBC # BLD AUTO: 7.8 K/UL (ref 4.8–10.8)

## 2018-12-04 PROCEDURE — 83605 ASSAY OF LACTIC ACID: CPT

## 2018-12-04 PROCEDURE — 93010 ELECTROCARDIOGRAM REPORT: CPT | Performed by: INTERNAL MEDICINE

## 2018-12-04 PROCEDURE — 700111 HCHG RX REV CODE 636 W/ 250 OVERRIDE (IP)

## 2018-12-04 PROCEDURE — 770020 HCHG ROOM/CARE - TELE (206)

## 2018-12-04 PROCEDURE — 306313 ANTI-EMBOLISM STOCKINGS XXXLG REG: Performed by: HOSPITALIST

## 2018-12-04 PROCEDURE — 85027 COMPLETE CBC AUTOMATED: CPT

## 2018-12-04 PROCEDURE — 700102 HCHG RX REV CODE 250 W/ 637 OVERRIDE(OP): Performed by: PSYCHIATRY & NEUROLOGY

## 2018-12-04 PROCEDURE — A9270 NON-COVERED ITEM OR SERVICE: HCPCS | Performed by: HOSPITALIST

## 2018-12-04 PROCEDURE — 36415 COLL VENOUS BLD VENIPUNCTURE: CPT

## 2018-12-04 PROCEDURE — A6250 SKIN SEAL PROTECT MOISTURIZR: HCPCS | Performed by: HOSPITALIST

## 2018-12-04 PROCEDURE — 99233 SBSQ HOSP IP/OBS HIGH 50: CPT | Performed by: HOSPITALIST

## 2018-12-04 PROCEDURE — 82962 GLUCOSE BLOOD TEST: CPT | Mod: 91

## 2018-12-04 PROCEDURE — 80053 COMPREHEN METABOLIC PANEL: CPT

## 2018-12-04 PROCEDURE — 93005 ELECTROCARDIOGRAM TRACING: CPT | Performed by: HOSPITALIST

## 2018-12-04 PROCEDURE — 700102 HCHG RX REV CODE 250 W/ 637 OVERRIDE(OP): Performed by: HOSPITALIST

## 2018-12-04 PROCEDURE — 303746 HCHG EXTERNAL PACEMAKER PAD

## 2018-12-04 PROCEDURE — 99232 SBSQ HOSP IP/OBS MODERATE 35: CPT | Performed by: INTERNAL MEDICINE

## 2018-12-04 PROCEDURE — A9270 NON-COVERED ITEM OR SERVICE: HCPCS | Performed by: PSYCHIATRY & NEUROLOGY

## 2018-12-04 PROCEDURE — 83735 ASSAY OF MAGNESIUM: CPT

## 2018-12-04 PROCEDURE — 700111 HCHG RX REV CODE 636 W/ 250 OVERRIDE (IP): Performed by: HOSPITALIST

## 2018-12-04 PROCEDURE — 84484 ASSAY OF TROPONIN QUANT: CPT

## 2018-12-04 RX ORDER — LORAZEPAM 2 MG/ML
0.5 INJECTION INTRAMUSCULAR ONCE
Status: COMPLETED | OUTPATIENT
Start: 2018-12-04 | End: 2018-12-04

## 2018-12-04 RX ORDER — LORAZEPAM 2 MG/ML
2 INJECTION INTRAMUSCULAR
Status: DISCONTINUED | OUTPATIENT
Start: 2018-12-04 | End: 2018-12-05

## 2018-12-04 RX ORDER — LORAZEPAM 2 MG/ML
INJECTION INTRAMUSCULAR
Status: COMPLETED
Start: 2018-12-04 | End: 2018-12-04

## 2018-12-04 RX ORDER — LORAZEPAM 2 MG/ML
4 INJECTION INTRAMUSCULAR
Status: DISCONTINUED | OUTPATIENT
Start: 2018-12-04 | End: 2018-12-04

## 2018-12-04 RX ADMIN — PYRIDOSTIGMINE BROMIDE 60 MG: 60 TABLET ORAL at 16:42

## 2018-12-04 RX ADMIN — PYRIDOSTIGMINE BROMIDE 60 MG: 60 TABLET ORAL at 05:38

## 2018-12-04 RX ADMIN — PRAVASTATIN SODIUM 20 MG: 20 TABLET ORAL at 16:43

## 2018-12-04 RX ADMIN — TRIAMCINOLONE ACETONIDE: 5 CREAM TOPICAL at 16:42

## 2018-12-04 RX ADMIN — LORAZEPAM 2 MG: 2 INJECTION INTRAMUSCULAR; INTRAVENOUS at 23:42

## 2018-12-04 RX ADMIN — NYSTATIN: 100000 POWDER TOPICAL at 13:58

## 2018-12-04 RX ADMIN — RIVAROXABAN 20 MG: 20 TABLET, FILM COATED ORAL at 16:43

## 2018-12-04 RX ADMIN — ACETAMINOPHEN 650 MG: 325 TABLET, FILM COATED ORAL at 09:29

## 2018-12-04 RX ADMIN — FLUDROCORTISONE ACETATE 0.2 MG: 0.1 TABLET ORAL at 05:38

## 2018-12-04 RX ADMIN — MIDODRINE HYDROCHLORIDE 10 MG: 5 TABLET ORAL at 05:38

## 2018-12-04 RX ADMIN — MIDODRINE HYDROCHLORIDE 10 MG: 5 TABLET ORAL at 16:43

## 2018-12-04 RX ADMIN — LORAZEPAM 0.5 MG: 2 INJECTION INTRAMUSCULAR; INTRAVENOUS at 09:28

## 2018-12-04 RX ADMIN — NYSTATIN: 100000 POWDER TOPICAL at 16:42

## 2018-12-04 RX ADMIN — NYSTATIN: 100000 POWDER TOPICAL at 05:40

## 2018-12-04 RX ADMIN — TRIAMCINOLONE ACETONIDE: 5 CREAM TOPICAL at 05:40

## 2018-12-04 ASSESSMENT — ENCOUNTER SYMPTOMS
NUMBNESS: 0
CONSTIPATION: 0
EYE DISCHARGE: 0
FEVER: 0
CHOKING: 0
DIARRHEA: 0
HEADACHES: 0
SORE THROAT: 0
SEIZURES: 0
APNEA: 0
DIZZINESS: 1
WHEEZING: 0
EYE REDNESS: 0
DEPRESSION: 1
COUGH: 0
STRIDOR: 0
TREMORS: 1
NERVOUS/ANXIOUS: 0
SHORTNESS OF BREATH: 0
NAUSEA: 0
CHEST TIGHTNESS: 0
DIZZINESS: 0
CONFUSION: 0
ABDOMINAL PAIN: 0
PALPITATIONS: 0
WEAKNESS: 1
FOCAL WEAKNESS: 0
LOSS OF CONSCIOUSNESS: 0
CHILLS: 0
VOMITING: 0

## 2018-12-04 ASSESSMENT — PAIN SCALES - GENERAL
PAINLEVEL_OUTOF10: 2
PAINLEVEL_OUTOF10: 4
PAINLEVEL_OUTOF10: 0
PAINLEVEL_OUTOF10: 0
PAINLEVEL_OUTOF10: 6
PAINLEVEL_OUTOF10: 0

## 2018-12-04 NOTE — PROGRESS NOTES
Cardiology Follow Up Progress Note    Date of Service  12/4/2018    Attending Physician  Sathish Patel M.D.    Reason for consultation   Dyspnea, volume overload      HPI  Sebastián Dempsey is 53 years old male presented with dyspnea and leg swelling.  With the weight gain of 40 pounds. Noted to be in acute on chronic severe diastolic heart failure. With complication of orthostatic hypotension.     Atrial fibrillation, hypertension, dyslipidemia, type 2 diabetes    Interim Events  12/4/18: this morning had chest pain he stated radiating to his right arm. When the nurse pressed on his chest, it got worse. Relieved with ativan. RN also stated he possibly loss consciousness and his eyes rolled back. Couldn't feel a pulse. When they were about to start compression, he woke up . Patients girlfriend at the bedside, stated this happens at times. He has history of pseudoseizures. EKG was obtained which remains the same.      12/3/18: Patient is resting in bed.  He has been ambulated today.  RN will take orthostatics.  Denies any chest pain, shortness of breath, dizziness or lightheadedness while at rest.  Vital signs stable.      Review of Systems  Review of Systems   Constitutional: Negative for chills and fever.   Respiratory: Negative for apnea, cough, choking, chest tightness, shortness of breath, wheezing and stridor.    Cardiovascular: Positive for chest pain and leg swelling.   Gastrointestinal: Negative for abdominal pain.   Neurological: Negative for dizziness and numbness.   Psychiatric/Behavioral: Negative for confusion. The patient is not nervous/anxious.        Vital signs in last 24 hours  Temp:  [36.2 °C (97.1 °F)-36.7 °C (98.1 °F)] 36.7 °C (98.1 °F)  Pulse:  [52-69] 69  Resp:  [16-24] 20  BP: ()/() 162/87    Physical Exam  Physical Exam   Constitutional: He is oriented to person, place, and time. No distress.   HENT:   Head: Normocephalic.   Eyes: Conjunctivae are normal.   Neck: No thyromegaly  present.   Cardiovascular: Normal rate and regular rhythm.    Pulses:       Carotid pulses are 2+ on the right side, and 2+ on the left side.       Radial pulses are 2+ on the right side, and 2+ on the left side.   Pulmonary/Chest: He has no wheezes.   Abdominal: Soft.   Musculoskeletal: He exhibits edema.   Neurological: He is alert and oriented to person, place, and time.   Skin: Skin is warm and dry. He is not diaphoretic.   Nursing note and vitals reviewed.      Lab Review  Lab Results   Component Value Date/Time    WBC 7.9 11/30/2018 02:31 AM    RBC 5.33 11/30/2018 02:31 AM    HEMOGLOBIN 14.9 11/30/2018 02:31 AM    HEMATOCRIT 47.3 11/30/2018 02:31 AM    MCV 87.2 11/30/2018 02:31 AM    MCH 28.5 11/30/2018 02:31 AM    MCHC 32.7 (L) 11/30/2018 02:31 AM    MPV 10.3 11/30/2018 02:31 AM      Lab Results   Component Value Date/Time    SODIUM 131 (L) 12/02/2018 12:35 AM    POTASSIUM 4.3 12/02/2018 12:35 AM    CHLORIDE 101 12/02/2018 12:35 AM    CO2 24 12/02/2018 12:35 AM    GLUCOSE 101 (H) 12/02/2018 12:35 AM    BUN 31 (H) 12/02/2018 12:35 AM    CREATININE 0.91 12/02/2018 12:35 AM      Lab Results   Component Value Date/Time    ASTSGOT 23 11/21/2018 08:36 AM    ALTSGPT 19 11/21/2018 08:36 AM     Lab Results   Component Value Date/Time    CHOLSTRLTOT 104 11/22/2018 12:07 AM    LDL 60 11/22/2018 12:07 AM    HDL 27 (A) 11/22/2018 12:07 AM    TRIGLYCERIDE 87 11/22/2018 12:07 AM    TROPONINI <0.01 11/21/2018 06:22 PM             Echocardiography Laboratory  11/22/18  Prior echo done 07/09/18, EF now normal.  Quality improved with contrast.  Left ventricular ejection fraction is visually estimated to be 60%.  Mild concentric left ventricular hypertrophy.  Mild mitral annular calcifcation, mild MR.  Mild tricuspid regurgitation.  Estimated right ventricular systolic pressure  is 30 mmHg.    Assessment/ Plan    1. HFpEF  - LVEF 60%  -Standing weight 235# ( weight ranges from 220-240 # per patient report )  - due to orthostatic  hypotension, unable to tolerate bb and ace/ arb    2. Atrial fibrillation:  - s/p successful DCCV 11/26/18  -Maintaining normal sinus rhythm  - EP consulted and recommend outpatient ablation   -Continue with oral anticoagulation with Xarelto 20 mg     3.  Orthostatic hypotension:  -Continue Florinef and Midodrin  - neurology consulted     4. Hypertension:  - controlled    5. Hyperlipidemia:  -Continue pravastatin 20 daily    6. History of pseudo seizures:  - neurology is following    Future Appointments  Date Time Provider Department Center   12/4/2018 11:15 AM [facility] EXAM 4 [facility] None   12/6/2018 3:15 PM Luda Henderson M.D. [facility] None   12/7/2018 10:20 AM Aaron Carrillo M.D. [facility] None

## 2018-12-04 NOTE — CARE PLAN
Problem: Safety  Goal: Will remain free from injury  Outcome: PROGRESSING AS EXPECTED  Assistance provided as needed with mobility. Call light within reach and pt educated to call for help as needed.   Goal: Will remain free from falls  Outcome: PROGRESSING AS EXPECTED  Call light within reach. Pt educated to call for help as needed. Pt has treaded socks, alarm, and bed locked and low to floor.

## 2018-12-04 NOTE — PROGRESS NOTES
Hospital Medicine Daily Progress Note    Date of Service  12/4/2018    Chief Complaint  53 y.o. male admitted 11/21/2018 with acute on chronic sCHF exacerbation    Hospital Course  Presented with complaints of leg swelling, and 40 pound weight gain with bilateral leg edema, increased swelling consistent with exacerbation of congestive heart failure patient was diuresed but then had decreased sodium level and severe orthostasis there was concern for possible seizure activity however EEG was negative    Interval Problem Update  Has not been set up today, but with stable and persistently elevated BP. Lunch dose of midodrine and mestinon held 2/2 hypertension. Having episodes of tremor and unresponsiveness, comes to very quickly within seconds and not post ictal.    Consultants/Specialty  Cards  Neurology    Code Status  Full    Disposition  Home pending medical clearance.     Review of Systems  Review of Systems   Constitutional: Positive for malaise/fatigue. Negative for fever.   HENT: Negative for congestion, hearing loss and sore throat.    Eyes: Negative for discharge and redness.   Respiratory: Negative for shortness of breath.    Cardiovascular: Positive for leg swelling (same). Negative for palpitations.   Gastrointestinal: Negative for abdominal pain, constipation, diarrhea, nausea and vomiting.   Genitourinary: Negative for dysuria and urgency.   Musculoskeletal: Negative for joint pain.   Neurological: Positive for dizziness (Orthostatic), tremors and weakness (improved). Negative for focal weakness, seizures, loss of consciousness and headaches.             Psychiatric/Behavioral: Positive for depression. The patient is not nervous/anxious.         Physical Exam  Temp:  [36.2 °C (97.1 °F)-36.7 °C (98.1 °F)] 36.7 °C (98.1 °F)  Pulse:  [52-69] 59  Resp:  [16-24] 20  BP: ()/(64-90) 158/85    Physical Exam   Constitutional: He is oriented to person, place, and time. He appears well-developed. He appears  listless.   HENT:   Head: Normocephalic and atraumatic.   Mouth/Throat: Oropharynx is clear and moist.   Eyes: Pupils are equal, round, and reactive to light. EOM are normal. Right eye exhibits no discharge. Left eye exhibits no discharge.   Neck: Normal range of motion. Neck supple.   Cardiovascular: Normal rate, regular rhythm, normal heart sounds and intact distal pulses.    No murmur heard.  Pulmonary/Chest: Effort normal and breath sounds normal. No stridor. He has no rales.   Abdominal: Soft. He exhibits no distension. There is no tenderness.   Obese   Musculoskeletal: Normal range of motion. He exhibits edema (1-2+). He exhibits no tenderness.   Neurological: He is oriented to person, place, and time. He appears listless. No cranial nerve deficit.   Skin: Skin is warm and dry. No rash noted. He is not diaphoretic.   Psychiatric: His speech is delayed. He is slowed.   Flat affect but answers questions appropriately    Vitals reviewed.      Fluids    Intake/Output Summary (Last 24 hours) at 12/04/18 1124  Last data filed at 12/04/18 0600   Gross per 24 hour   Intake              750 ml   Output              750 ml   Net                0 ml       Laboratory  Recent Labs      12/04/18   1028   WBC  7.8   RBC  5.60   HEMOGLOBIN  15.8   HEMATOCRIT  48.0   MCV  85.7   MCH  28.2   MCHC  32.9*   RDW  41.3   PLATELETCT  255   MPV  10.3     Recent Labs      12/02/18   0035  12/04/18   1028   SODIUM  131*  136   POTASSIUM  4.3  4.3   CHLORIDE  101  102   CO2  24  29   GLUCOSE  101*  99   BUN  31*  28*   CREATININE  0.91  0.90   CALCIUM  9.4  9.5                   Imaging  CT-HEAD W/O   Final Result      No acute intracranial abnormality is identified.      Paranasal sinus disease.      CT-HEAD W/O   Final Result      No acute intracranial abnormality is identified.      Paranasal sinus disease.      EC-ECHOCARDIOGRAM COMPLETE W/ CONT   Final Result      EC-ECHOCARDIOGRAM COMPLETE W/O CONT         DX-CHEST-PORTABLE (1  VIEW)   Final Result      1.  Hypoinflation without other evidence for acute cardiopulmonary disease.   2.  Stable cardiomegaly.           Assessment/Plan  Orthostatic hypotension- (present on admission)   Assessment & Plan    - continue with midodrine and mestinon, with holding parameters for elevated BPs  - neurology evaluated, greatly appreciate  - continue florinef  - possibly start on droxidopa outpatient with PCP, per neuro recs  - amiodarone discontinued per neuro recs, cards aware  - daily orthostatics     Acute on chronic diastolic heart failure (HCC)- (present on admission)   Assessment & Plan    ECHO with EF 60%. Given diastolic dysfunction and A fib, likely an element of lost atrial kick  - s/p 11/26 DCCV for atrial fibrillation  - still orthostatic     Class 2 obesity due to excess calories without serious comorbidity with body mass index (BMI) of 37.0 to 37.9 in adult- (present on admission)   Assessment & Plan    Patient will need outpatient weight loss management program.  Body mass index is 37.97 kg/m².       Paroxysmal A-fib (HCC)- (present on admission)   Assessment & Plan    Rate controlled, s/p electrical cardioversion. Now sinus.  - continue xarelto     Seizure-like activity (HCC)   Assessment & Plan    Discussed with neurology again, does not appear to be orthostatic tremors as he's not orthostatic during the episodes today. Unclear etiology or if pseudoseizures.  - s/p EEG on 11/26 negative  - monitor clinically  - outpatient follow up     Syncope- (present on admission)   Assessment & Plan    Remains orthostatic, probable autonomic dysfunction,   - management as above  - Discharge on hold pending resolution or at least acceptable levels of hypotension     Essential hypertension- (present on admission)   Assessment & Plan    Will accept a higher baseline blood pressure in order to avoid the low standing blood pressures  Not currenty on antihypertensives, highest 160's     Mixed  hyperlipidemia- (present on admission)   Assessment & Plan    Low-fat low-cholesterol diet.  Continue with statin.  Lab Results   Component Value Date/Time    CHOLSTRLTOT 119 07/10/2018 02:30 AM    LDL 68 07/10/2018 02:30 AM    HDL 24 (A) 07/10/2018 02:30 AM    TRIGLYCERIDE 136 07/10/2018 02:30 AM               GERD (gastroesophageal reflux disease)- (present on admission)   Assessment & Plan    Controlled on PPI     Type 2 diabetes mellitus, without long-term current use of insulin, with peripheral neuropathy (HCC)- (present on admission)   Assessment & Plan    A1C 6.3%, without hyperglycemia. Holding home metformin  - Sugars are at goal and not requiring sliding scale          VTE prophylaxis: xarelto

## 2018-12-04 NOTE — PROGRESS NOTES
Assumed care at 0700. Pt A&O x 4. Pt not on monitor, medical pt. First assessment completed, call light within reach, bed locked and in low position. All questions asked. Will continue to monitor.

## 2018-12-04 NOTE — CODE DOCUMENTATION
Pt c/o chest pain, present on palpation.  Pulse check and no pulse was felt, one compression performed and pt woke up.

## 2018-12-04 NOTE — PROGRESS NOTES
Bedside report received from day shift RN. Assumed care. Pt is A&O x 4, Pt is Medical. Pt denies pain at this time. Pt was updated on plan of care. Pt has call light, personal belongings, and bedside table within reach. Bed is in the lowest position, bed alarm/strip alarm is on. Will continue to monitor.

## 2018-12-04 NOTE — HEART FAILURE PROGRAM
Cardiovascular Nurse Navigator () Advanced Heart Failure Program Inpatient Progress Note:    Neuro to be reconsulted today, apparent loss of consciousness this morning. Dr. Black saw yesterday recommending outpatient AF ablation + ADD after amio wash out.    Patient has a PCP appointment on 12/6 and a HF clinic appointment on 12/7.     If it starts to look like patient won't be discharged by Tomorrow, Wednesday, 12/5 these appointments should be pushed out.    Hospital schedulers are here seven days a week, [phone] and can be reached at extension [phone], they will handle the seven day follow up appointment for you if you call them!    Thank you and please call GAIL Manzanares with questions M-F

## 2018-12-04 NOTE — CONSULTS
EP Consult Note    DOS: 12/3/2018    Consulting physician: Presley Almeida MD    Chief complaint/Reason for consult: Persistent AF    HPI:  Patient is a 52 yo WM. He has history of atrial fibrillation diagnosed earlier this year about 12 months ago. At that time, despite the chart diagnosis of paroxysmal AF, he seems like he has mostly been in persistent AF. He has likely what is untreated sleep apnea (he admits to excessive daytime sleepiness and has been told he snores quite a bit), a history of medication non-compliance that he says has been fixed now, and rate related cardiomyopathy with prior history of depressed LV systolic function. He has been on high dose amiodarone 200 BID since early 2018. He was admitted with significant volume overload and was diuresed aggressively and then cardioverted. Subsequently he has developed significant orthostatic hypotension that has been not responding to fluids, midodrine, florinef. Neurology was consulted and felt that a significant of portion of this was a diabetic neuropathy along with drug effect, and recommended cessation of amiodarone. EP consulted for rhythm management and to consider AF ablation for him.    ROS (+ highlighted in red):  Constitutional: Fevers/chills/fatigue/weightloss  HEENT: Blurry vision/eye pain/sore throat/hearing loss  Respiratory: Shortness of breath/cough  Cardiovascular: Chest pain/palpitations/edema/orthopnea/syncope  GI: Nausea/vomitting/diarrhea  MSK: Arthralgias/myagias/muscle weakness  Skin: Rash/sores  Neurological: Numbness/tremors/vertigo  Endocrine: Excessive thirst/polyuria/cold intolerance/heat intolerance  Psych: Depression/anxiety    Past Medical History:   Diagnosis Date   • A-fib (HCC)    • Chest pain    • Congestive heart failure (HCC)    • Diabetes (HCC)    • Diabetic neuropathy (HCC)    • Hypercholesterolemia    • Hypertension    • Morbid obesity (HCC)    • Noncompliance    • Normal cardiac stress test        Past  Surgical History:   Procedure Laterality Date   • CARDIAC CATH  07/21/2018    EF 45%, normal coronaries.   • IRRIGATION & DEBRIDEMENT ORTHO Right 2/19/2018    Procedure: IRRIGATION & DEBRIDEMENT ORTHO-FOOT;  Surgeon: Kirby Lopez M.D.;  Location: SURGERY [facility];  Service: Orthopedics   • TOE AMPUTATION Right 1/17/2018    Procedure: TOE AMPUTATION-1ST RAY;  Surgeon: Doug Delong M.D.;  Location: SURGERY [facility];  Service: Orthopedics   • TOE AMPUTATION Right 11/10/2017    Procedure: TOE AMPUTATION;  Surgeon: Osorio Leo M.D.;  Location: SURGERY [facility];  Service: Orthopedics       Social History     Social History   • Marital status:      Spouse name: N/A   • Number of children: N/A   • Years of education: N/A     Occupational History   • Not on file.     Social History Main Topics   • Smoking status: Never Smoker   • Smokeless tobacco: Never Used   • Alcohol use No   • Drug use: No   • Sexual activity: Not on file     Other Topics Concern   • Not on file     Social History Narrative   • No narrative on file       Family History   Problem Relation Age of Onset   • No Known Problems Mother    • No Known Problems Father        Allergies   Allergen Reactions   • Daptomycin Rash     Body rash  RXN=1/2018     • Pcn [Penicillins] Hives and Rash      Rash & hives  RXN=since a kid   • Unasyn [Ampicillin-Sulbactam Sodium] Hives, Itching and Swelling   • Vancomycin Rash     Red man syndrome       Current Facility-Administered Medications   Medication Dose Route Frequency Provider Last Rate Last Dose   • pyridostigmine (MESTINON) tablet 60 mg  60 mg Oral TID Mariposa Jimenez M.D.   60 mg at 12/03/18 1721   • midodrine (PROAMATINE) tablet 10 mg  10 mg Oral TID Lesley Fregoso M.D.   10 mg at 12/03/18 1721   • fludrocortisone (FLORINEF) tablet 0.2 mg  0.2 mg Oral QAAME Fregoso M.D.   0.2 mg at 12/03/18 0524   • triamcinolone acetonide (KENALOG) 0.5 % cream   Topical BID  Yolie Ceron M.D.       • hydrALAZINE (APRESOLINE) injection 10 mg  10 mg Intravenous Q4HRS PRN Carosn Briggs D.O.       • furosemide (LASIX) tablet 20 mg  20 mg Oral PRN Carly Peña, A.P.R.N.       • potassium chloride SA (Kdur) tablet 20 mEq  20 mEq Oral PRN Carly Peña, A.P.R.N.       • pravastatin (PRAVACHOL) tablet 20 mg  20 mg Oral Q EVENING Sandra Mccann M.D.   20 mg at 12/03/18 1720   • rivaroxaban (XARELTO) tablet 20 mg  20 mg Oral PM MEAL Sandra Mccann M.D.   20 mg at 12/03/18 1721   • senna-docusate (PERICOLACE or SENOKOT S) 8.6-50 MG per tablet 2 Tab  2 Tab Oral BID Sandra Mccann M.D.   Stopped at 12/03/18 0600    And   • polyethylene glycol/lytes (MIRALAX) PACKET 1 Packet  1 Packet Oral QDAY PRN Sandra Mccann M.D.        And   • magnesium hydroxide (MILK OF MAGNESIA) suspension 30 mL  30 mL Oral QDAY PRN Sandra Mccann M.D.        And   • bisacodyl (DULCOLAX) suppository 10 mg  10 mg Rectal QDAY PRN Sandra Mccann M.D.       • acetaminophen (TYLENOL) tablet 650 mg  650 mg Oral Q6HRS PRN Sandra Mccann M.D.   650 mg at 11/28/18 1230   • labetalol (NORMODYNE,TRANDATE) injection 10 mg  10 mg Intravenous Q4HRS PRN Sandra Mccann M.D.       • ondansetron (ZOFRAN) syringe/vial injection 4 mg  4 mg Intravenous Q4HRS PRN Sandra Mccann M.D.       • ondansetron (ZOFRAN ODT) dispertab 4 mg  4 mg Oral Q4HRS PRN Sandra Mccann M.D.       • promethazine (PHENERGAN) tablet 12.5-25 mg  12.5-25 mg Oral Q4HRS PRN Sandra Mccann M.D.       • promethazine (PHENERGAN) suppository 12.5-25 mg  12.5-25 mg Rectal Q4HRS PRN Sandra Mccann M.D.       • prochlorperazine (COMPAZINE) injection 5-10 mg  5-10 mg Intravenous Q4HRS PRN Sandra Mccann M.D.       • nystatin (MYCOSTATIN) powder   Topical TID Sandra Mccann M.D.           Physical Exam:  Vitals:    12/03/18 1346 12/03/18 1635 12/03/18 1636 12/03/18 1637   BP: 108/64 156/74 157/75 (!) 99/67   Pulse: 66 (!) 53     Resp:  16     Temp:  36.5 °C  (97.7 °F)     TempSrc:  Temporal     SpO2:  97%     Weight:       Height:         General appearance: NAD, conversant   Eyes: anicteric sclerae, moist conjunctivae; no lid-lag; PERRLA  HENT: Atraumatic; oropharynx clear with moist mucous membranes and no mucosal ulcerations; normal hard and soft palate  Neck: Trachea midline; FROM, supple, no thyromegaly or lymphadenopathy  Lungs: CTA, with normal respiratory effort and no intercostal retractions  CV: RRR, no MRGs, no JVD   Abdomen: Soft, non-tender; no masses or HSM  Extremities: No peripheral edema or extremity lymphadenopathy  Skin: Normal temperature, turgor and texture; no rash, ulcers or subcutaneous nodules  Psych: Appropriate affect, alert and oriented to person, place and time    Data:  Labs from this admission reviewed    Prior echo/stress results reviewed:  LVEF 60%    CXR interpreted by me:  WNL    EKGs over the last year interpreted by me:   Mostly AF, currently sinus with 1st degree AV block    Impression/Plan:  1)Persistent AF  2)Refractory orthostatic intolerance  3)History of systolic CHF 2/2 to tachy induced cardiomyopathy  4)Chronic high dose amiodarone use  5)Untreated sleep apnea  6)Chronic anti-coagualtion    -I spoke with him regarding his persistent AF and that without additional drugs/procedures I am almost certain he will go back into AF  -He reports that he is very symptomatic when in AF and that this precipitates HF episodes for him at times  -I think AF ablation for him + AAD is a reasonable course of action for him  -I agree with stopping the amiodarone, especially at that dose given his young age  -I think given neuro rec to avoid BP meds and beta blockers, Tikosyn is reasonable for him though I would like the amiodarone to wash out first  -Risks/benefits/alternatives to ablation discussed and he wants to proceed  -I will have the office schedule him for outpatient ablation followed by Tikosyn load  -I would suggest sending him home on  at least some sanket agent (BB or CCB) despite his orthostasis as there is a high chance he will go back into AF and potentially with RVR resulting in repeat decompensation  -Stay on oneal Black MD

## 2018-12-04 NOTE — DISCHARGE PLANNING
Patient LACE Score = 86  - Previous admissions: CHF, LE edema x2, GLF x2, Chest Pain, cellulitis, Prox Afib x2, A-fib with RVR x2, syncope    Patient admitted for CHF exacerbation with concomitant positive orthostatics, will need AF packet, follow up appointment scheduled for 12/7/18. Out-patient A-fib ablation to be scheduled and start of Tikosyn. Pt has missed previous appointments in the past and semi noncompliance with medications, assess barriers to attending appointments and medication regimen. PT/OT rec home health, pt has had home health in the past and feels home health would benefit him at discharge will need repeat eval for discharge.     12/4: Pseudoseizure event with non-responsiveness, neurology re-consulted. Pt states he feels lightheaded when ambulating and is concerned when walking any distance.     Follow Up:  Reschedule appointments   Barriers to med and appointments  - MTM information provided to patient  Pharmacy verification   - pharmacy updated to [facility] off [address]  PT/OT eval

## 2018-12-04 NOTE — PROGRESS NOTES
Patient c/o chest pain non radiating. Said its lasted about 30 minutes, increases when we press on his chest. Dr. Fang notified. STAT EKG ordered. Before EKG could come, patient appeared to become unresponsive. A pulse was unable to be palpated. Staff assist was called. ELISA Christensen attempted to do a compression, but patient immediately woke up. Rapid called. MD at bedside. VSS. Patient AOx4. EKG reviewed by MD. No significant changes. Plan to re-consult neuro, trend vitals, and give ativan once.

## 2018-12-04 NOTE — CARE PLAN
Problem: Communication  Goal: The ability to communicate needs accurately and effectively will improve  Outcome: PROGRESSING AS EXPECTED  Pt's white board is updated. Pt has been updated on POC. All questions have been answered at this time.    Problem: Safety  Goal: Will remain free from injury  Outcome: PROGRESSING AS EXPECTED  Bed locked in lowest position. Bed alarm on. Treaded socks in use. Call light and belongings within reach. Patient educated to call for assistance. Pt verbalized understanding. Hourly rounding in place.

## 2018-12-04 NOTE — CARE PLAN
Problem: Safety  Goal: Will remain free from injury  Outcome: PROGRESSING AS EXPECTED  No sustained injuries. Uses call light appropriately as needed.   Goal: Will remain free from falls  Outcome: PROGRESSING AS EXPECTED  Pt has not fallen. Uses call light appropriately, call light within reach. Bed and strip alarm on.     Problem: Knowledge Deficit  Goal: Knowledge of disease process/condition, treatment plan, diagnostic tests, and medications will improve  Outcome: PROGRESSING AS EXPECTED  Pt verbalizes he understands medications, reason for orthostatic BP checks, treatment plan and reason his is still not eligible for discharge.

## 2018-12-05 PROBLEM — F44.5 PSYCHOGENIC NONEPILEPTIC SEIZURE: Status: ACTIVE | Noted: 2018-11-24

## 2018-12-05 LAB
ANION GAP SERPL CALC-SCNC: 7 MMOL/L (ref 0–11.9)
BUN SERPL-MCNC: 30 MG/DL (ref 8–22)
CALCIUM SERPL-MCNC: 9.5 MG/DL (ref 8.5–10.5)
CHLORIDE SERPL-SCNC: 103 MMOL/L (ref 96–112)
CK SERPL-CCNC: 20 U/L (ref 0–154)
CO2 SERPL-SCNC: 25 MMOL/L (ref 20–33)
CREAT SERPL-MCNC: 0.89 MG/DL (ref 0.5–1.4)
GLUCOSE BLD-MCNC: 102 MG/DL (ref 65–99)
GLUCOSE SERPL-MCNC: 104 MG/DL (ref 65–99)
LACTATE BLD-SCNC: 1.2 MMOL/L (ref 0.5–2)
POTASSIUM SERPL-SCNC: 4.3 MMOL/L (ref 3.6–5.5)
PROLACTIN SERPL-MCNC: 12.85 NG/ML (ref 2.1–17.7)
SODIUM SERPL-SCNC: 135 MMOL/L (ref 135–145)

## 2018-12-05 PROCEDURE — 36415 COLL VENOUS BLD VENIPUNCTURE: CPT

## 2018-12-05 PROCEDURE — 83605 ASSAY OF LACTIC ACID: CPT

## 2018-12-05 PROCEDURE — A9270 NON-COVERED ITEM OR SERVICE: HCPCS | Performed by: PSYCHIATRY & NEUROLOGY

## 2018-12-05 PROCEDURE — 84146 ASSAY OF PROLACTIN: CPT

## 2018-12-05 PROCEDURE — A9270 NON-COVERED ITEM OR SERVICE: HCPCS | Performed by: HOSPITALIST

## 2018-12-05 PROCEDURE — 4A00X4Z MEASUREMENT OF CENTRAL NERVOUS ELECTRICAL ACTIVITY, EXTERNAL APPROACH: ICD-10-PCS | Performed by: PSYCHIATRY & NEUROLOGY

## 2018-12-05 PROCEDURE — 82550 ASSAY OF CK (CPK): CPT

## 2018-12-05 PROCEDURE — 700102 HCHG RX REV CODE 250 W/ 637 OVERRIDE(OP): Performed by: PSYCHIATRY & NEUROLOGY

## 2018-12-05 PROCEDURE — 770020 HCHG ROOM/CARE - TELE (206)

## 2018-12-05 PROCEDURE — 99232 SBSQ HOSP IP/OBS MODERATE 35: CPT | Performed by: HOSPITALIST

## 2018-12-05 PROCEDURE — 95951 EEG-24 HR: CPT | Performed by: PSYCHIATRY & NEUROLOGY

## 2018-12-05 PROCEDURE — 99231 SBSQ HOSP IP/OBS SF/LOW 25: CPT | Performed by: PSYCHIATRY & NEUROLOGY

## 2018-12-05 PROCEDURE — 95951 EEG-24 HR: CPT | Mod: 26,52 | Performed by: PSYCHIATRY & NEUROLOGY

## 2018-12-05 PROCEDURE — 99232 SBSQ HOSP IP/OBS MODERATE 35: CPT | Performed by: INTERNAL MEDICINE

## 2018-12-05 PROCEDURE — 700102 HCHG RX REV CODE 250 W/ 637 OVERRIDE(OP): Performed by: HOSPITALIST

## 2018-12-05 PROCEDURE — 80048 BASIC METABOLIC PNL TOTAL CA: CPT

## 2018-12-05 RX ADMIN — NYSTATIN: 100000 POWDER TOPICAL at 12:28

## 2018-12-05 RX ADMIN — MIDODRINE HYDROCHLORIDE 10 MG: 5 TABLET ORAL at 12:27

## 2018-12-05 RX ADMIN — NYSTATIN: 100000 POWDER TOPICAL at 05:47

## 2018-12-05 RX ADMIN — NYSTATIN: 100000 POWDER TOPICAL at 17:31

## 2018-12-05 RX ADMIN — PRAVASTATIN SODIUM 20 MG: 20 TABLET ORAL at 17:31

## 2018-12-05 RX ADMIN — TRIAMCINOLONE ACETONIDE: 5 CREAM TOPICAL at 17:31

## 2018-12-05 RX ADMIN — TRIAMCINOLONE ACETONIDE: 5 CREAM TOPICAL at 05:47

## 2018-12-05 RX ADMIN — PYRIDOSTIGMINE BROMIDE 60 MG: 60 TABLET ORAL at 17:31

## 2018-12-05 RX ADMIN — FLUDROCORTISONE ACETATE 0.2 MG: 0.1 TABLET ORAL at 05:46

## 2018-12-05 RX ADMIN — ACETAMINOPHEN 650 MG: 325 TABLET, FILM COATED ORAL at 23:21

## 2018-12-05 RX ADMIN — MIDODRINE HYDROCHLORIDE 10 MG: 5 TABLET ORAL at 05:46

## 2018-12-05 RX ADMIN — PYRIDOSTIGMINE BROMIDE 60 MG: 60 TABLET ORAL at 05:47

## 2018-12-05 RX ADMIN — MIDODRINE HYDROCHLORIDE 10 MG: 5 TABLET ORAL at 17:31

## 2018-12-05 RX ADMIN — RIVAROXABAN 20 MG: 20 TABLET, FILM COATED ORAL at 17:31

## 2018-12-05 RX ADMIN — PYRIDOSTIGMINE BROMIDE 60 MG: 60 TABLET ORAL at 12:27

## 2018-12-05 ASSESSMENT — ENCOUNTER SYMPTOMS
CHILLS: 0
APNEA: 0
DEPRESSION: 1
ABDOMINAL PAIN: 0
SORE THROAT: 0
NUMBNESS: 0
EYE DISCHARGE: 0
LOSS OF CONSCIOUSNESS: 1
HEADACHES: 0
DIARRHEA: 0
DIZZINESS: 1
PALPITATIONS: 0
SEIZURES: 1
DIZZINESS: 0
COUGH: 0
VOMITING: 0
WHEEZING: 0
STRIDOR: 0
WEAKNESS: 1
FOCAL WEAKNESS: 0
FEVER: 0
SHORTNESS OF BREATH: 0
CONSTIPATION: 0
CONFUSION: 1
TREMORS: 1
CHEST TIGHTNESS: 0
NAUSEA: 0
NERVOUS/ANXIOUS: 0
CHOKING: 0

## 2018-12-05 ASSESSMENT — PAIN SCALES - GENERAL
PAINLEVEL_OUTOF10: 0
PAINLEVEL_OUTOF10: 0
PAINLEVEL_OUTOF10: 7
PAINLEVEL_OUTOF10: 0
PAINLEVEL_OUTOF10: 0

## 2018-12-05 NOTE — PROGRESS NOTES
Bedside report received from day shift RN. Assumed care. Pt is A&O x 4, Pt is in Sinus Rhythm 97. Pt denies pain at this time. Pt was updated on plan of care. Pt has call light, personal belongings, and bedside table within reach. Bed is in the lowest position and bed alarm is on. Will continue to monitor.

## 2018-12-05 NOTE — PROGRESS NOTES
Bedside report received. POC discussed with pt; all questions answered at this time.  A&Ox4.  Pt denies pain.  Fall precautions in place.

## 2018-12-05 NOTE — CARE PLAN
Problem: Safety  Goal: Will remain free from falls  Outcome: PROGRESSING AS EXPECTED  Fall precautions in place. Bed in lowest position. Non-skid socks in place. Personal possessions within reach. Mobility sign on door. Bed-alarm on. Call light within reach. Pt educated regarding fall prevention and states understanding.       Problem: Knowledge Deficit  Goal: Knowledge of disease process/condition, treatment plan, diagnostic tests, and medications will improve  Outcome: PROGRESSING AS EXPECTED  Pt educated regarding plan of care and medications. All questions answered.

## 2018-12-05 NOTE — PROCEDURES
EEG 12/05/18 1:09 PM    VIDEO ELECTROENCEPHALOGRAM / EPILEPSY MONITORING UNIT REPORT      Referring provider: Sandra Mccann M.D.    DOS:   12/5/2018      INDICATION:  Sebastián Castro 53 y.o. male presenting with seizures    CURRENT ANTIEPILEPTIC REGIMEN: Lorazepam     DURATION: one hour and 40 minutes      TECHNIQUE: A 30-channel video electroencephalogram (VEEG) was performed in accordance with the international 10-20 system. This digital study was reviewed in bipolar and referential montages. The recording examined the patient during wakeful, drowsy and sleep states.         DESCRIPTION OF THE RECORD:  During the awake state, background shows symmetrical 10-11 Hz alpha activity posteriorly with amplitude of 70 mV.  There was reactivity with eye opening/closure.  Normal anterior-posterior gradient was noted with faster beta frequencies seen anteriorly.  During drowsiness, high-amplitude delta frequencies were seen. There are 3 events of body shaking, legs jerking in-and out, without EEG changes except muscle artifacts, these spells lasted for 30 seconds to minutes or so    During the sleep state, background shows diffuse high-amplitude 4-5 Hz delta activity.  Symmetrical high-amplitude sleep spindles and vertex sharp activities were seen in the central leads.    ACTIVATION PROCEDURES:   Hyperventilation was not performed  Intermittent Photic stimulation was not performed      ICTAL AND/OR INTERICTAL FINDINGS:   No focal or generalized epileptiform activity was noted. No regional slowing was seen during this study.  No seizures were recorded during the study.     EVENT(S):  There are 3 events of body shaking, legs jerking in-and out, without EEG changes except muscle artifacts, these spells lasted for 30 seconds to minutes or so    EKG: sampling review of EKG recording demonstrated regular rhythm      INTERPRETATION:       ________________________________________________________________________    This is normal  routine video EEG recording in the awake and drowsy/sleep state(s).    This scalp video EEG remains not remarkable (despite 3 events of body shaking - psychogenic nonepileptic events)    Of note, unremarkable EEG does not completely exclude the diagnosis  of seizures since seizure is an episodic phenomena and frontal lobe seizures could have normal scalp EEG. Clinical correlation may help     If clinical suspicion of seizure remains high.  Prolonged outpatient EEG   monitoring may be of help.    EEG 12/05/18 1:14 PM  ________________________________________________________________________

## 2018-12-05 NOTE — PROGRESS NOTES
Neurology Progress note    Neurology contacted for patient presenting with Seizure like activity. He was evaluated earlier this admission and was noted for nonepileptic movement. Repeated video EEG remains not remarkable despite 3 events of body shaking. Findings suggest of psychogenic nonepileptic events. Psychiatric evaluation may be warranted. Neurology will sign off.    Moncho Bangura

## 2018-12-05 NOTE — PROGRESS NOTES
Cardiology Follow Up Progress Note    Date of Service  12/5/2018    Attending Physician  Sathish Patel M.D.    Reason for consultation   Dyspnea, volume overload      HPI  Sebastián Dempsey is 53 years old male presented with dyspnea and leg swelling.  With the weight gain of 40 pounds. Noted to be in acute on chronic severe diastolic heart failure. With complication of orthostatic hypotension.     Atrial fibrillation, hypertension, dyslipidemia, type 2 diabetes    Interim Events  12/5/18: patient had another episode of seizure like episode at midnight. Patient confused this morning. Vital sign stable.     12/4/18: this morning had chest pain he stated radiating to his right arm. When the nurse pressed on his chest, it got worse. Relieved with ativan. RN also stated he possibly loss consciousness and his eyes rolled back. Couldn't feel a pulse. When they were about to start compression, he woke up . Patients girlfriend at the bedside, stated this happens at times. He has history of pseudoseizures. EKG was obtained which remains the same.      12/3/18: Patient is resting in bed.  He has been ambulated today.  RN will take orthostatics.  Denies any chest pain, shortness of breath, dizziness or lightheadedness while at rest.  Vital signs stable.      Review of Systems  Review of Systems   Constitutional: Negative for chills and fever.   Respiratory: Negative for apnea, cough, choking, chest tightness, shortness of breath, wheezing and stridor.    Cardiovascular: Negative for chest pain and leg swelling.   Gastrointestinal: Negative for abdominal pain.   Neurological: Positive for seizures. Negative for dizziness and numbness.   Psychiatric/Behavioral: Positive for confusion. The patient is not nervous/anxious.        Vital signs in last 24 hours  Temp:  [36.6 °C (97.8 °F)-36.8 °C (98.2 °F)] 36.6 °C (97.8 °F)  Pulse:  [52-69] 52  Resp:  [14-24] 17  BP: (113-173)/(64-90) 130/64    Physical Exam  Physical Exam    Constitutional: He is oriented to person, place, and time. No distress.   HENT:   Head: Normocephalic.   Eyes: Conjunctivae are normal.   Neck: No thyromegaly present.   Cardiovascular: Normal rate and regular rhythm.    Pulses:       Carotid pulses are 2+ on the right side, and 2+ on the left side.       Radial pulses are 2+ on the right side, and 2+ on the left side.   Pulmonary/Chest: He has no wheezes.   Abdominal: Soft.   Musculoskeletal: He exhibits edema.   Neurological: He is alert and oriented to person, place, and time.   Skin: Skin is warm and dry. He is not diaphoretic.   Nursing note and vitals reviewed.      Lab Review  Lab Results   Component Value Date/Time    WBC 7.8 12/04/2018 10:28 AM    RBC 5.60 12/04/2018 10:28 AM    HEMOGLOBIN 15.8 12/04/2018 10:28 AM    HEMATOCRIT 48.0 12/04/2018 10:28 AM    MCV 85.7 12/04/2018 10:28 AM    MCH 28.2 12/04/2018 10:28 AM    MCHC 32.9 (L) 12/04/2018 10:28 AM    MPV 10.3 12/04/2018 10:28 AM      Lab Results   Component Value Date/Time    SODIUM 135 12/05/2018 02:32 AM    POTASSIUM 4.3 12/05/2018 02:32 AM    CHLORIDE 103 12/05/2018 02:32 AM    CO2 25 12/05/2018 02:32 AM    GLUCOSE 104 (H) 12/05/2018 02:32 AM    BUN 30 (H) 12/05/2018 02:32 AM    CREATININE 0.89 12/05/2018 02:32 AM      Lab Results   Component Value Date/Time    ASTSGOT 22 12/04/2018 10:28 AM    ALTSGPT 27 12/04/2018 10:28 AM     Lab Results   Component Value Date/Time    CHOLSTRLTOT 104 11/22/2018 12:07 AM    LDL 60 11/22/2018 12:07 AM    HDL 27 (A) 11/22/2018 12:07 AM    TRIGLYCERIDE 87 11/22/2018 12:07 AM    TROPONINI <0.01 12/04/2018 10:28 AM             Echocardiography Laboratory  11/22/18  Prior echo done 07/09/18, EF now normal.  Quality improved with contrast.  Left ventricular ejection fraction is visually estimated to be 60%.  Mild concentric left ventricular hypertrophy.  Mild mitral annular calcifcation, mild MR.  Mild tricuspid regurgitation.  Estimated right ventricular systolic  pressure  is 30 mmHg.    Assessment/ Plan    1. HFpEF  - LVEF 60%  -Standing weight 235# ( weight ranges from 220-240 # per patient report )  - due to orthostatic hypotension, unable to tolerate bb and ace/ arb    2. Atrial fibrillation:  - s/p successful DCCV 11/26/18  -Maintaining normal sinus rhythm  - EP consulted and recommend outpatient ablation   -Continue with oral anticoagulation with Xarelto 20 mg     3.  Orthostatic hypotension:  -Continue Florinef and Midodrin  - neurology consulted   - neuro added mestinon potentially cause neuro changes?    4. Hypertension:  - controlled    5. Hyperlipidemia:  -Continue pravastatin 20 daily    6. History of pseudo seizures:  - neurology is following    Future Appointments  Date Time Provider Department Center   12/4/2018 11:15 AM [facility] EXAM 4 [facility] None   12/6/2018 3:15 PM Luda Henderson M.D. [facility] None   12/7/2018 10:20 AM Aaron Carrillo M.D. [facility] None

## 2018-12-05 NOTE — PROGRESS NOTES
Patient has been having since yesterday recurrent events of LOC and shaking.  Had seen before by neurology for it, and was diagnosed as non epileptic.  Now the patient has restarted again, for which c EEG is recommended.  And the c EEG already documented #3 clearly typical spells which were no epileptic, and no syncopal either   Patient has no epileptic spells.  Need psychiatry evaluation.  Neurology signs off.

## 2018-12-05 NOTE — PROGRESS NOTES
Hospital Medicine Daily Progress Note    Date of Service  12/5/2018    Chief Complaint  53 y.o. male admitted 11/21/2018 with acute on chronic sCHF exacerbation    Hospital Course  Presented with complaints of leg swelling, and 40 pound weight gain with bilateral leg edema, increased swelling consistent with exacerbation of congestive heart failure patient was diuresed but then had decreased sodium level and severe orthostasis there was concern for possible seizure activity however EEG was negative in November.    Interval Problem Update  Doing well this AM, gf at the bedside. States that he had a seizure last night and was given ativan. No post ictal confusion, incontinence. Later on had several episodes while on EEG monitor, no epileptiform activity.    Consultants/Specialty  Cards  Neurology    Code Status  Full    Disposition  Home pending medical clearance.     Review of Systems  Review of Systems   Constitutional: Positive for malaise/fatigue. Negative for chills and fever.   HENT: Negative for congestion, hearing loss and sore throat.    Eyes: Negative for discharge.   Respiratory: Negative for shortness of breath.    Cardiovascular: Positive for leg swelling (same). Negative for palpitations.   Gastrointestinal: Negative for abdominal pain, constipation, diarrhea, nausea and vomiting.   Genitourinary: Negative for dysuria and urgency.   Musculoskeletal: Negative for joint pain.   Neurological: Positive for dizziness (Orthostatic), tremors, seizures (per patient), loss of consciousness and weakness (improved). Negative for focal weakness and headaches.   Psychiatric/Behavioral: Positive for depression. The patient is not nervous/anxious.         Physical Exam  Temp:  [36.6 °C (97.8 °F)-36.8 °C (98.2 °F)] 36.6 °C (97.8 °F)  Pulse:  [52-69] 52  Resp:  [14-24] 17  BP: (113-173)/(64-90) 130/64    Physical Exam   Constitutional: He is oriented to person, place, and time. He appears well-developed.   HENT:   Head:  Normocephalic and atraumatic.   Mouth/Throat: Oropharynx is clear and moist.   Eyes: Pupils are equal, round, and reactive to light. EOM are normal. Right eye exhibits no discharge. Left eye exhibits no discharge.   Neck: Normal range of motion.   Cardiovascular: Normal rate, regular rhythm and intact distal pulses.    No murmur heard.  Pulmonary/Chest: Effort normal and breath sounds normal. No stridor. He has no rales.   Abdominal: Soft. He exhibits no distension. There is no tenderness.   Musculoskeletal: Normal range of motion. He exhibits edema (1-2+). He exhibits no tenderness.   Neurological: He is alert and oriented to person, place, and time. No cranial nerve deficit.   Skin: Skin is warm and dry. No rash noted. He is not diaphoretic.   Psychiatric: His speech is delayed. He is slowed.   Flat affect but answers questions appropriately    Vitals reviewed.      Fluids    Intake/Output Summary (Last 24 hours) at 12/05/18 0703  Last data filed at 12/05/18 0600   Gross per 24 hour   Intake              740 ml   Output             1510 ml   Net             -770 ml       Laboratory  Recent Labs      12/04/18   1028   WBC  7.8   RBC  5.60   HEMOGLOBIN  15.8   HEMATOCRIT  48.0   MCV  85.7   MCH  28.2   MCHC  32.9*   RDW  41.3   PLATELETCT  255   MPV  10.3     Recent Labs      12/04/18   1028  12/05/18   0232   SODIUM  136  135   POTASSIUM  4.3  4.3   CHLORIDE  102  103   CO2  29  25   GLUCOSE  99  104*   BUN  28*  30*   CREATININE  0.90  0.89   CALCIUM  9.5  9.5                   Imaging  CT-HEAD W/O   Final Result      No acute intracranial abnormality is identified.      Paranasal sinus disease.      CT-HEAD W/O   Final Result      No acute intracranial abnormality is identified.      Paranasal sinus disease.      EC-ECHOCARDIOGRAM COMPLETE W/ CONT   Final Result      EC-ECHOCARDIOGRAM COMPLETE W/O CONT         DX-CHEST-PORTABLE (1 VIEW)   Final Result      1.  Hypoinflation without other evidence for acute  cardiopulmonary disease.   2.  Stable cardiomegaly.           Assessment/Plan  Orthostatic hypotension- (present on admission)   Assessment & Plan    - continue with midodrine and mestinon, with holding parameters for elevated BPs  - neurology evaluated, greatly appreciate  - continue florinef  - possibly start on droxidopa outpatient with PCP, per neuro recs  - amiodarone discontinued per neuro recs, cards aware  - daily orthostatics     Psychogenic nonepileptic seizure- (present on admission)   Assessment & Plan    Discussed with neurology again, does not appear to be orthostatic tremors as he's not orthostatic during the episodes today. No evidence that they are epileptic in nature  - neurology evaluated, greatly appreciate (signed off)  - s/p EEG on 11/26 negative  - cEEG read pending  - psych consult placed, greatly appreciated  - monitor clinically  - outpatient follow up     Acute on chronic diastolic heart failure (HCC)- (present on admission)   Assessment & Plan    ECHO with EF 60%. Given diastolic dysfunction and A fib, likely an element of lost atrial kick  - s/p 11/26 DCCV for atrial fibrillation  - still orthostatic, daily orthostatics     Syncope- (present on admission)   Assessment & Plan    Remains orthostatic, probable autonomic dysfunction  - management as above  - Discharge on hold pending resolution or at least acceptable levels of hypotension     Paroxysmal A-fib (HCC)- (present on admission)   Assessment & Plan    Rate controlled, s/p electrical cardioversion. Now sinus.  - continue xarelto     Essential hypertension- (present on admission)   Assessment & Plan    Will accept a higher baseline blood pressure in order to avoid the low standing blood pressures  Not currenty on antihypertensives, highest 160's     Mixed hyperlipidemia- (present on admission)   Assessment & Plan    Low-fat low-cholesterol diet.  Continue with statin.  Lab Results   Component Value Date/Time    CHOLSTRLTOT 119  07/10/2018 02:30 AM    LDL 68 07/10/2018 02:30 AM    HDL 24 (A) 07/10/2018 02:30 AM    TRIGLYCERIDE 136 07/10/2018 02:30 AM               GERD (gastroesophageal reflux disease)- (present on admission)   Assessment & Plan    Controlled on PPI     Type 2 diabetes mellitus, without long-term current use of insulin, with peripheral neuropathy (HCC)- (present on admission)   Assessment & Plan    A1C 6.3%, without hyperglycemia. Holding home metformin  - Sugars are at goal and not requiring sliding scale          VTE prophylaxis: xarelto

## 2018-12-05 NOTE — PROGRESS NOTES
Monitor Summary    Medical until 1000    Sinus sandie with 1st Degree AV Block 56 - 59    .32/.08/.40

## 2018-12-05 NOTE — CODE DOCUMENTATION
Patient unresponsive when bedside Rn came to check on him,  after sternal rub patient began to have a non-tonic clonic seizure.

## 2018-12-05 NOTE — PROGRESS NOTES
Pt began seizing, subsided after 60 seconds, and then went back into seizure. Second seizure activity subsided. Pt regained consciousness. Given 2 mg of Ativan per Dr. Ford's order. VS stable, Dr. Ford at bedside. Order for 2 mg IV ativan PRN if pt begins to have seizure activity. Neuro will be consulting in the morning. Refer to Rapid Responses' note.

## 2018-12-05 NOTE — PROGRESS NOTES
Nursing staff called rapid for seizure activity  Lasted a minute  No postictal confusion when I came and saw him. Back to his baseline.  Ordered PRN Ativan  Ordered repeat EEG  Can reconsult Neurology or wait for EEG results. May need to upgrade to 24hour EEG if this continues or start Keppra.

## 2018-12-05 NOTE — THERAPY
PT session attempted, hold mobility per RN due to several pseudoseizures today and risk of fall. Will re attempt as appropriate and able.    Lena Anderson, PT, DPT  [phone]

## 2018-12-05 NOTE — THERAPY
Occupational Therapy Contact Note:    OT tx attempted, hold OOB activity per RN due to several pseudoseizures today and high risk for fall fall. Pt had rapid called yesterday. Will round back when pt is medically stable and as able.     Sarah Lobato OTR/L  Pager: [phone]

## 2018-12-06 ENCOUNTER — APPOINTMENT (OUTPATIENT)
Dept: VASCULAR LAB | Facility: MEDICAL CENTER | Age: 53
End: 2018-12-06
Payer: MEDICAID

## 2018-12-06 PROCEDURE — 700102 HCHG RX REV CODE 250 W/ 637 OVERRIDE(OP): Performed by: HOSPITALIST

## 2018-12-06 PROCEDURE — A9270 NON-COVERED ITEM OR SERVICE: HCPCS | Performed by: HOSPITALIST

## 2018-12-06 PROCEDURE — 700102 HCHG RX REV CODE 250 W/ 637 OVERRIDE(OP): Performed by: PSYCHIATRY & NEUROLOGY

## 2018-12-06 PROCEDURE — 99232 SBSQ HOSP IP/OBS MODERATE 35: CPT | Performed by: HOSPITALIST

## 2018-12-06 PROCEDURE — 99223 1ST HOSP IP/OBS HIGH 75: CPT | Performed by: PSYCHIATRY & NEUROLOGY

## 2018-12-06 PROCEDURE — A9270 NON-COVERED ITEM OR SERVICE: HCPCS | Performed by: PSYCHIATRY & NEUROLOGY

## 2018-12-06 PROCEDURE — 770020 HCHG ROOM/CARE - TELE (206)

## 2018-12-06 RX ORDER — FLUOXETINE HYDROCHLORIDE 20 MG/1
20 CAPSULE ORAL DAILY
Status: DISCONTINUED | OUTPATIENT
Start: 2018-12-07 | End: 2018-12-18 | Stop reason: HOSPADM

## 2018-12-06 RX ADMIN — TRIAMCINOLONE ACETONIDE: 5 CREAM TOPICAL at 06:28

## 2018-12-06 RX ADMIN — NYSTATIN: 100000 POWDER TOPICAL at 06:27

## 2018-12-06 RX ADMIN — MIDODRINE HYDROCHLORIDE 10 MG: 5 TABLET ORAL at 06:27

## 2018-12-06 RX ADMIN — FLUDROCORTISONE ACETATE 0.2 MG: 0.1 TABLET ORAL at 06:26

## 2018-12-06 RX ADMIN — RIVAROXABAN 20 MG: 20 TABLET, FILM COATED ORAL at 17:54

## 2018-12-06 RX ADMIN — PRAVASTATIN SODIUM 20 MG: 20 TABLET ORAL at 17:54

## 2018-12-06 RX ADMIN — PYRIDOSTIGMINE BROMIDE 60 MG: 60 TABLET ORAL at 06:26

## 2018-12-06 RX ADMIN — PYRIDOSTIGMINE BROMIDE 60 MG: 60 TABLET ORAL at 17:54

## 2018-12-06 RX ADMIN — PYRIDOSTIGMINE BROMIDE 60 MG: 60 TABLET ORAL at 10:47

## 2018-12-06 RX ADMIN — MIDODRINE HYDROCHLORIDE 10 MG: 5 TABLET ORAL at 10:46

## 2018-12-06 RX ADMIN — NYSTATIN: 100000 POWDER TOPICAL at 12:07

## 2018-12-06 RX ADMIN — MIDODRINE HYDROCHLORIDE 10 MG: 5 TABLET ORAL at 17:54

## 2018-12-06 ASSESSMENT — PAIN SCALES - GENERAL
PAINLEVEL_OUTOF10: 0

## 2018-12-06 ASSESSMENT — ENCOUNTER SYMPTOMS
VOMITING: 0
ABDOMINAL PAIN: 0
DIZZINESS: 1
CONSTIPATION: 0
FEVER: 0
LOSS OF CONSCIOUSNESS: 0
NERVOUS/ANXIOUS: 0
EYE DISCHARGE: 0
NAUSEA: 0
SORE THROAT: 0
HALLUCINATIONS: 0
WEAKNESS: 1
DIARRHEA: 0
SEIZURES: 0
FOCAL WEAKNESS: 0
TREMORS: 1
HEADACHES: 0
DEPRESSION: 1
SHORTNESS OF BREATH: 0
PALPITATIONS: 0
CHILLS: 0

## 2018-12-06 NOTE — PSYCHIATRY
"PSYCHIATRIC CONSULTATION:  Reason for admission:     Reason for consult: functional seizures   Requesting Physician:Leoncio    Legal status:  No     Chief Complaint: \"I've been through a lot\"    HPI:   Sebastián Castro is a 53 y.o. year old male with a PMH of CHF, a fib, DM, HTN,  Amputation, who presented to the [facility] complaining of leg swelling.   He has been confused on and off. He has had episodes of asymmetric shaking of upper extremities, closed eyes, unable to follow commands during episode, when his arm held in the air during episode and let go he held it up. No change in vital signs during episodes, EEG has been negative. He had cardioversion on 11/26 which was successful. He started to have an increase in his creatinine after diuresis. Pt seemed no longer to be having seizure-like episodes, but they started up again on 12/4. 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 He is having episodes that resolve quickly with no post-ictal state.   He has a history of sudden paraplegia that occurred and resolved spontaneously. The first time it happened it took one month to recover, and recovered in rehab. 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 It was not surgical. In July when this happened PT thought his behaviors were limiting his progression in PT, and were in agreement his paresis had some psychogenic component to it. They stated \"pt reports non use of his LEs, however if distracted and then given gross motor cue will perform mobility without issue\". He also had tremors that resolved when distracted.   He is quite stoic despite incredible stress. He has lost multiple members of his family, in cluding his wife several years ago. 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 He met his current s/o while living in the shelter. They are quite close and she is very supportive. He is very afraid of " "dying. He has reexperiencing symtpoms related to his arrhythmias and his ampuation. He has nightmares related to his wife's death, and he states he was there when she . He has nightmares where he wakes up panicking looking for his foot/leg, after nightmares it has been amputated completely. He had to retire and has had a very hard time dealing with that. He values working and used to enjoy his job.     Review of Systems:  Psychiatric:  Depression:      Energy wnl. Has a depressed mood at times. No si.   Unique:No signs or symptoms   Anxiety/Panic Attacks  Anxiety related to social situation and health   PTSD symptom: +nightmares, hypervigilance, reecperiencing symptoms, sense of forehsortened future.   Psychosis:No signs or symptoms     Other:  Neurologic:  Tremors, periods of shaking and weakness, periods of apparently losing consciousness, dizziness  Eyes:  No blurry vision.   Skin:  Red legs, swelling.   Musculoskeletal:  No joint pain   CV:  Leg swelling.   Lungs:  No SOB   GI:  No n/v, no diarrhea, no constipation   :  Denies dysuria  Endocrine:  No complaints  All other systems reviewed and are negative.        Psychiatric Examination: observed phenomenon:  Vitals: BP (!) 166/84   Pulse (!) 58   Temp 36.6 °C (97.8 °F) (Temporal)   Resp 16   Ht 1.829 m (6' 0.01\")   Wt 94.1 kg (207 lb 7.3 oz)   SpO2 98%   BMI 28.13 kg/m²  Body mass index is 28.13 kg/m².  Musculoskeletal: no psychomotora gitation or retardation   Appearance: Grooming wnl   Thought Process: Logical and sequential, goal-directed   Thought Content: No a/vh, no evidence of delusions, no ideas of reference, no internal stimulation noted   Speech: Nl tone, rate, and volume. Not pressured. Understandable.   Mood:          \"worried\"  Affect: full and congruent           SI/HI:   Denies   Attention/Alertness:   Awake, alert, attention wnl   Memory:    Grossly intact.   Orientation:       Grossly intact.   Cognition:  Grooming wnl "   Insight/Judgement into symptoms:  Grossly intact.   Neurological Testing:          Past Psychiatric Hx:   No suicide attempts.   No previous psychiatric treatment       Family Psychiatric Hx:  Denies     Social Hx:  Social History     Social History   • Marital status:      Spouse name: N/A   • Number of children: N/A   • Years of education: N/A     Occupational History   • Not on file.     Social History Main Topics   • Smoking status: Never Smoker   • Smokeless tobacco: Never Used   • Alcohol use No   • Drug use: No   • Sexual activity: Not on file     Other Topics Concern   • Not on file     Social History Narrative   • No narrative on file     Lives with his significant other in [place]. He has income from disability.   He has been homeless this year and stayed in the shelter in October. He has also been at [facility] at times.   Pt has a step-son named keegan [phone]   Uses bus for transportation   Lost job in 3/2018 and couldn't get a new one due to wound on his foot.     Drug/Alcohol/Tobacco Hx:   Drugs:denies   Alcohol:sober x 2 years       Medical Hx: labs, MARS, medications, etc were reviewed. Only those findings of potential interest to psychiatry are noted below:  Neurological:    TBIs:denies   SZs:non-epileptiform   Strokes:denies   Other: syncopal episodes this year.   Other medical:   Thyroid:denies   Diabetes:+   Cardiovascular disease:+  Past Medical History:   Diagnosis Date   • A-fib (HCC)    • Chest pain    • Congestive heart failure (HCC)    • Diabetes (HCC)    • Diabetic neuropathy (HCC)    • Hypercholesterolemia    • Hypertension    • Morbid obesity (HCC)    • Noncompliance    • Normal cardiac stress test      Past Surgical History:   Procedure Laterality Date   • CARDIAC CATH  07/21/2018    EF 45%, normal coronaries.   • IRRIGATION & DEBRIDEMENT ORTHO Right 2/19/2018    Procedure: IRRIGATION & DEBRIDEMENT ORTHO-FOOT;  Surgeon: Kirby Lopez M.D.;  Location: SURGERY [facility];  Service: Orthopedics   • TOE AMPUTATION Right 1/17/2018    Procedure: TOE AMPUTATION-1ST RAY;  Surgeon: Doug Delong M.D.;  Location: SURGERY [facility];  Service: Orthopedics   • TOE AMPUTATION Right 11/10/2017    Procedure: TOE AMPUTATION;  Surgeon: Osorio Leo M.D.;  Location: SURGERY [facility];  Service: Orthopedics       Allergies:   Allergies   Allergen Reactions   • Daptomycin Rash     Body rash  RXN=1/2018     • Pcn [Penicillins] Hives and Rash      Rash & hives  RXN=since a kid   • Unasyn [Ampicillin-Sulbactam Sodium] Hives, Itching and Swelling   • Vancomycin Rash     Red man syndrome       Medications:  Current Facility-Administered Medications   Medication Dose Route Frequency Provider Last Rate Last Dose   • pyridostigmine (MESTINON) tablet 60 mg  60 mg Oral TID Mariposa Jimenez M.D.   60 mg at 12/06/18 1047   • midodrine (PROAMATINE) tablet 10 mg  10 mg Oral TID Lesley Fregoso M.D.   10 mg at 12/06/18 1046   • fludrocortisone (FLORINEF) tablet 0.2 mg  0.2 mg Oral QAM Lesley Fregoso M.D.   0.2 mg at 12/06/18 0626   • triamcinolone acetonide (KENALOG) 0.5 % cream   Topical BID Yolie Ceron M.D.       • hydrALAZINE (APRESOLINE) injection 10 mg  10 mg Intravenous Q4HRS PRN Carson Briggs D.O.       • furosemide (LASIX) tablet 20 mg  20 mg Oral PRN Carly Peña, A.P.R.N.       • potassium chloride SA (Kdur) tablet 20 mEq  20 mEq Oral PRN Carly Peña, A.P.R.N.       • pravastatin (PRAVACHOL) tablet 20 mg  20 mg Oral Q EVENING Sandra Mccann M.D.   20 mg at 12/05/18 1731   • rivaroxaban (XARELTO) tablet 20 mg  20 mg Oral PM MEAL Sandra Mccann M.D.   20 mg at 12/05/18 1731   • senna-docusate (PERICOLACE or SENOKOT S) 8.6-50 MG per tablet 2 Tab  2 Tab Oral BID Sandra Mccann M.D.   Stopped at 12/03/18 0600    And   • polyethylene glycol/lytes (MIRALAX) PACKET 1 Packet  1 Packet Oral QDAY PRN Sandra Mccann M.D.        And   • magnesium hydroxide  (MILK OF MAGNESIA) suspension 30 mL  30 mL Oral QDAY PRN Sandra Mccann M.D.        And   • bisacodyl (DULCOLAX) suppository 10 mg  10 mg Rectal QDAY PRN Sandra Mccann M.D.       • acetaminophen (TYLENOL) tablet 650 mg  650 mg Oral Q6HRS PRN Sandra Mccann M.D.   650 mg at 12/05/18 2321   • labetalol (NORMODYNE,TRANDATE) injection 10 mg  10 mg Intravenous Q4HRS PRN Sandra Mccann M.D.       • ondansetron (ZOFRAN) syringe/vial injection 4 mg  4 mg Intravenous Q4HRS PRN Sandra Mccann M.D.       • ondansetron (ZOFRAN ODT) dispertab 4 mg  4 mg Oral Q4HRS PRN Sandra Mccann M.D.       • promethazine (PHENERGAN) tablet 12.5-25 mg  12.5-25 mg Oral Q4HRS PRN Sandra Mccann M.D.       • promethazine (PHENERGAN) suppository 12.5-25 mg  12.5-25 mg Rectal Q4HRS PRN Sandra Mccann M.D.       • prochlorperazine (COMPAZINE) injection 5-10 mg  5-10 mg Intravenous Q4HRS PRN Sandra Mccann M.D.       • nystatin (MYCOSTATIN) powder   Topical TID Sandra Mccann M.D.           Labs/ Testing:  Recent Results (from the past 48 hour(s))   ACCU-CHEK GLUCOSE    Collection Time: 12/04/18 11:38 PM   Result Value Ref Range    Glucose - Accu-Ck 102 (H) 65 - 99 mg/dL   BASIC METABOLIC PANEL    Collection Time: 12/05/18  2:32 AM   Result Value Ref Range    Sodium 135 135 - 145 mmol/L    Potassium 4.3 3.6 - 5.5 mmol/L    Chloride 103 96 - 112 mmol/L    Co2 25 20 - 33 mmol/L    Glucose 104 (H) 65 - 99 mg/dL    Bun 30 (H) 8 - 22 mg/dL    Creatinine 0.89 0.50 - 1.40 mg/dL    Calcium 9.5 8.5 - 10.5 mg/dL    Anion Gap 7.0 0.0 - 11.9   Lactic acid (lactate)    Collection Time: 12/05/18  2:32 AM   Result Value Ref Range    Lactic Acid 1.2 0.5 - 2.0 mmol/L   Prolactin    Collection Time: 12/05/18  2:32 AM   Result Value Ref Range    Prolactin 12.85 2.10 - 17.70 ng/mL   Creatine Kinase (CPK)    Collection Time: 12/05/18  2:32 AM   Result Value Ref Range    CPK Total 20 0 - 154 U/L   ESTIMATED GFR    Collection Time: 12/05/18  2:32 AM   Result Value Ref  Range    GFR If African American >60 >60 mL/min/1.73 m 2    GFR If Non African American >60 >60 mL/min/1.73 m 2       CT-HEAD W/O   Final Result      No acute intracranial abnormality is identified.      Paranasal sinus disease.      CT-HEAD W/O   Final Result      No acute intracranial abnormality is identified.      Paranasal sinus disease.      EC-ECHOCARDIOGRAM COMPLETE W/ CONT   Final Result      EC-ECHOCARDIOGRAM COMPLETE W/O CONT         DX-CHEST-PORTABLE (1 VIEW)   Final Result      1.  Hypoinflation without other evidence for acute cardiopulmonary disease.   2.  Stable cardiomegaly.            ASSESSMENT:   Adjutsment disorder with depressed mood  PTSD  Functional Neurologic Symptom diosrder     PLAN:    Starting on prozac 20mg po daily    Discussed how anxiety is behaviorally maintained and why medications like xanax and ativan are not recommended and can decrease the utility of therapy. Discussed how escape and avoidance can worsen anxiety disorders and discussed alternative therapy treatments that will focus on increasing function.     Recommending therapy for stressors and PTSD  Recommending adaptive sports at [facility]    Thank you for the consult.

## 2018-12-06 NOTE — PROGRESS NOTES
Cardiovascular Nurse Navigator () Advanced Heart Failure   Program Inpatient Progress Note:    Patient having episodes of tremors and unresponsiveness arouses quickly with no post-ictal state, neurology consulted, EEG neg. Neuro recommending psych consult.    No psych note yet so I have asked hospital schedulers to push out patient's appt at the HF clinic tomorrow.    Thank you and please call GAIL Manzanares with questions M-F

## 2018-12-06 NOTE — PROGRESS NOTES
Bedside report received from previous nurse regarding prior 12 hours.  POC reviewed with pt.  White board updated.  Pt verbalizes understanding.  Call light within reach.

## 2018-12-06 NOTE — PROGRESS NOTES
"Hospital Medicine Daily Progress Note    Date of Service  12/6/2018    Chief Complaint  53 y.o. male admitted 11/21/2018 with acute on chronic sCHF exacerbation    Hospital Course  Presented with complaints of leg swelling, and 40 pound weight gain with bilateral leg edema, increased swelling consistent with exacerbation of congestive heart failure patient was diuresed but then had decreased sodium level and severe orthostasis there was concern for possible seizure activity however EEG was negative in November.    Interval Problem Update  States that he had a very good night, was able to sleep and had no \" seizures\".  Repeat orthostatic vital signs continue to be positive and patient is symptomatic.  No acute overnight events    Consultants/Specialty  Cards  Neurology    Code Status  Full    Disposition  Home pending medical clearance.     Review of Systems  Review of Systems   Constitutional: Positive for malaise/fatigue. Negative for chills and fever.   HENT: Negative for congestion, hearing loss and sore throat.    Eyes: Negative for discharge.   Respiratory: Negative for shortness of breath.    Cardiovascular: Positive for leg swelling (same). Negative for palpitations.   Gastrointestinal: Negative for abdominal pain, constipation, diarrhea, nausea and vomiting.   Genitourinary: Negative for dysuria and urgency.   Musculoskeletal: Negative for joint pain.   Neurological: Positive for dizziness (Orthostatic), tremors and weakness (improved). Negative for focal weakness, seizures, loss of consciousness and headaches.   Psychiatric/Behavioral: Positive for depression. Negative for hallucinations. The patient is not nervous/anxious.         Physical Exam  Temp:  [36.2 °C (97.2 °F)-36.8 °C (98.3 °F)] 36.7 °C (98 °F)  Pulse:  [] 54  Resp:  [16-20] 16  BP: (115-154)/(49-87) 130/68    Physical Exam   Constitutional: He is oriented to person, place, and time. He appears well-developed. No distress.   HENT:   Head: " Normocephalic.   Mouth/Throat: Oropharynx is clear and moist.   Eyes: Pupils are equal, round, and reactive to light. EOM are normal. Right eye exhibits no discharge. Left eye exhibits no discharge.   Neck: Normal range of motion.   Cardiovascular: Normal rate, regular rhythm and intact distal pulses.    No murmur heard.  Pulmonary/Chest: Effort normal. No stridor. No respiratory distress.   Abdominal: Soft. He exhibits no distension. There is no tenderness.   Musculoskeletal: Normal range of motion. He exhibits edema (1-2+, compression stockings in place). He exhibits no tenderness.   Neurological: He is alert and oriented to person, place, and time. He has normal strength. No cranial nerve deficit or sensory deficit.   Skin: Skin is warm and dry. No rash noted. He is not diaphoretic.   Psychiatric: His speech is delayed. He is slowed.   Flat/odd affect but answers questions appropriately    Vitals reviewed.      Fluids    Intake/Output Summary (Last 24 hours) at 12/06/18 0725  Last data filed at 12/06/18 0600   Gross per 24 hour   Intake             1190 ml   Output             1250 ml   Net              -60 ml       Laboratory  Recent Labs      12/04/18   1028   WBC  7.8   RBC  5.60   HEMOGLOBIN  15.8   HEMATOCRIT  48.0   MCV  85.7   MCH  28.2   MCHC  32.9*   RDW  41.3   PLATELETCT  255   MPV  10.3     Recent Labs      12/04/18   1028  12/05/18   0232   SODIUM  136  135   POTASSIUM  4.3  4.3   CHLORIDE  102  103   CO2  29  25   GLUCOSE  99  104*   BUN  28*  30*   CREATININE  0.90  0.89   CALCIUM  9.5  9.5                   Imaging  CT-HEAD W/O   Final Result      No acute intracranial abnormality is identified.      Paranasal sinus disease.      CT-HEAD W/O   Final Result      No acute intracranial abnormality is identified.      Paranasal sinus disease.      EC-ECHOCARDIOGRAM COMPLETE W/ CONT   Final Result      EC-ECHOCARDIOGRAM COMPLETE W/O CONT         DX-CHEST-PORTABLE (1 VIEW)   Final Result      1.   Hypoinflation without other evidence for acute cardiopulmonary disease.   2.  Stable cardiomegaly.           Assessment/Plan  Orthostatic hypotension- (present on admission)   Assessment & Plan    Suspect autonomic dysfunction secondary to his diabetes.  No significant electrolyte abnormalities to suggest other etiologies.  - continue with midodrine and mestinon, with holding parameters for elevated BPs  - neurology evaluated, greatly appreciate  - continue florinef  - possibly start on droxidopa outpatient with PCP, per neuro recs  - amiodarone discontinued per neuro recs, cards aware  - daily orthostatics, positive again today  -Lifestyle modification such as increased water intake, abdominal or lower extremity compression     Psychogenic nonepileptic seizure- (present on admission)   Assessment & Plan    Discussed with neurology again, does not appear to be orthostatic tremors as he's not orthostatic during the episodes today. No evidence that they are epileptic in nature.  EEG from 12/5 negative for epileptiform discharges during his episodes.  - neurology evaluated, greatly appreciate (signed off)  - s/p EEG on 11/26 and 12/5 negative  - psych consult placed, greatly appreciated  - monitor clinically  - outpatient follow up     Acute on chronic diastolic heart failure (HCC)- (present on admission)   Assessment & Plan    ECHO with EF 60%. Given diastolic dysfunction and A fib, likely an element of lost atrial kick  - s/p 11/26 DCCV for atrial fibrillation  - still orthostatic despite cardioversion and return to sinus rhythm     Syncope- (present on admission)   Assessment & Plan    Remains orthostatic, probable autonomic dysfunction  - management as above  - Discharge on hold pending resolution or at least acceptable levels of hypotension     Paroxysmal A-fib (HCC)- (present on admission)   Assessment & Plan    Rate controlled, s/p electrical cardioversion. Now sinus rhythm  - continue xarelto  -Outpatient  cardiology follow-up     Essential hypertension- (present on admission)   Assessment & Plan    BP has been relatively stable but continues to be orthostatic with standing.  Will accept a higher baseline blood pressure in order to avoid the low standing blood pressures  - Not currenty on antihypertensives, highest 160's     Mixed hyperlipidemia- (present on admission)   Assessment & Plan    Low-fat low-cholesterol diet.  Continue with statin.  Lab Results   Component Value Date/Time    CHOLSTRLTOT 119 07/10/2018 02:30 AM    LDL 68 07/10/2018 02:30 AM    HDL 24 (A) 07/10/2018 02:30 AM    TRIGLYCERIDE 136 07/10/2018 02:30 AM               GERD (gastroesophageal reflux disease)- (present on admission)   Assessment & Plan    Stable   -controlled on PPI     Type 2 diabetes mellitus, without long-term current use of insulin, with peripheral neuropathy (HCC)- (present on admission)   Assessment & Plan    A1C 6.3%, without hyperglycemia. Holding home metformin  - Sugars are at goal and not requiring sliding scale          VTE prophylaxis: xarelto

## 2018-12-06 NOTE — CARE PLAN
Problem: Communication  Goal: The ability to communicate needs accurately and effectively will improve  Outcome: PROGRESSING AS EXPECTED  Communication board updated. All pt questions answered at this time and no further questions. Pt encouraged to voice feelings and ask questions as they arise.     Problem: Safety  Goal: Will remain free from injury  Outcome: PROGRESSING AS EXPECTED  Patient educated about risk of falls and reasoning for use of tread socks, calling for help, and bed alarms.

## 2018-12-06 NOTE — PROGRESS NOTES
Monitor Summary:   0.28/0.10/0.44     Rhythm: 1st degree heart block SB-SR 49-96       Ectopy: R PAC

## 2018-12-07 ENCOUNTER — APPOINTMENT (OUTPATIENT)
Dept: VASCULAR LAB | Facility: MEDICAL CENTER | Age: 53
End: 2018-12-07
Payer: MEDICAID

## 2018-12-07 PROCEDURE — A9270 NON-COVERED ITEM OR SERVICE: HCPCS | Performed by: PSYCHIATRY & NEUROLOGY

## 2018-12-07 PROCEDURE — 306313 ANTI-EMBOLISM STOCKINGS XXXLG REG: Performed by: HOSPITALIST

## 2018-12-07 PROCEDURE — 700102 HCHG RX REV CODE 250 W/ 637 OVERRIDE(OP): Performed by: PSYCHIATRY & NEUROLOGY

## 2018-12-07 PROCEDURE — 700102 HCHG RX REV CODE 250 W/ 637 OVERRIDE(OP): Performed by: HOSPITALIST

## 2018-12-07 PROCEDURE — 770020 HCHG ROOM/CARE - TELE (206)

## 2018-12-07 PROCEDURE — A9270 NON-COVERED ITEM OR SERVICE: HCPCS | Performed by: HOSPITALIST

## 2018-12-07 PROCEDURE — 99232 SBSQ HOSP IP/OBS MODERATE 35: CPT | Performed by: HOSPITALIST

## 2018-12-07 PROCEDURE — 700102 HCHG RX REV CODE 250 W/ 637 OVERRIDE(OP): Performed by: INTERNAL MEDICINE

## 2018-12-07 PROCEDURE — A9270 NON-COVERED ITEM OR SERVICE: HCPCS | Performed by: INTERNAL MEDICINE

## 2018-12-07 RX ORDER — POTASSIUM CHLORIDE 1.5 G/1.58G
10 POWDER, FOR SOLUTION ORAL DAILY
Status: ON HOLD | COMMUNITY
End: 2018-12-29

## 2018-12-07 RX ADMIN — MIDODRINE HYDROCHLORIDE 10 MG: 5 TABLET ORAL at 11:17

## 2018-12-07 RX ADMIN — PYRIDOSTIGMINE BROMIDE 60 MG: 60 TABLET ORAL at 06:25

## 2018-12-07 RX ADMIN — FLUOXETINE HYDROCHLORIDE 20 MG: 20 CAPSULE ORAL at 06:25

## 2018-12-07 RX ADMIN — PRAVASTATIN SODIUM 20 MG: 20 TABLET ORAL at 17:41

## 2018-12-07 RX ADMIN — MIDODRINE HYDROCHLORIDE 10 MG: 5 TABLET ORAL at 17:41

## 2018-12-07 RX ADMIN — NYSTATIN: 100000 POWDER TOPICAL at 06:25

## 2018-12-07 RX ADMIN — PYRIDOSTIGMINE BROMIDE 60 MG: 60 TABLET ORAL at 11:17

## 2018-12-07 RX ADMIN — MIDODRINE HYDROCHLORIDE 10 MG: 5 TABLET ORAL at 06:24

## 2018-12-07 RX ADMIN — TRIAMCINOLONE ACETONIDE: 5 CREAM TOPICAL at 06:25

## 2018-12-07 RX ADMIN — RIVAROXABAN 20 MG: 20 TABLET, FILM COATED ORAL at 17:41

## 2018-12-07 RX ADMIN — TRIAMCINOLONE ACETONIDE: 5 CREAM TOPICAL at 17:41

## 2018-12-07 RX ADMIN — FLUDROCORTISONE ACETATE 0.2 MG: 0.1 TABLET ORAL at 06:25

## 2018-12-07 RX ADMIN — NYSTATIN: 100000 POWDER TOPICAL at 17:41

## 2018-12-07 RX ADMIN — PYRIDOSTIGMINE BROMIDE 60 MG: 60 TABLET ORAL at 17:41

## 2018-12-07 RX ADMIN — NYSTATIN: 100000 POWDER TOPICAL at 11:17

## 2018-12-07 ASSESSMENT — ENCOUNTER SYMPTOMS
DEPRESSION: 1
NERVOUS/ANXIOUS: 0
CHILLS: 0
DIAPHORESIS: 0
WEAKNESS: 1
ABDOMINAL PAIN: 0
FOCAL WEAKNESS: 0
FALLS: 0
HEADACHES: 0
PALPITATIONS: 0
BLOOD IN STOOL: 0
NAUSEA: 0
EYE DISCHARGE: 0
DIZZINESS: 1
SORE THROAT: 0
HALLUCINATIONS: 0
SHORTNESS OF BREATH: 0
VOMITING: 0
SEIZURES: 0
FEVER: 0
LOSS OF CONSCIOUSNESS: 0
TREMORS: 1

## 2018-12-07 ASSESSMENT — PAIN SCALES - GENERAL
PAINLEVEL_OUTOF10: 0

## 2018-12-07 NOTE — PROGRESS NOTES
Bedside report received from ELISA Gomez. POC discussed with pt; all questions answered at this time. White board updated. Appropriate functioning of tele monitor confirmed. All needs met at this time.

## 2018-12-07 NOTE — PSYCHIATRY
BRIEF PSYCHIATRIC CONSULT NOTE: patient seen, full note to follow.  -Legal Hold:not indicated     -Orders Placed: routine  -Assessment:    Functional Neurologic symtpom disorder    PTSD    -Plan:continue to follow      Starting prozac 20mg po daily Warned of s/e including but not limited to worsening mood and treatment emergent karuna.  Pt expressed understanding and agreed to trial, and agreed to f/u with physician if side effects became present.     This is mostly to treat PTSD symtpoms.     Educated on FNSD and what can drive non-epileptiform seizures. At this time, recommend not reacting to them, no prns when they happen, ground him to the present if possible, and move on quickly.     Gave referrals to [facility] and [facility] for outpatient psychotherapy for PTSD.     Thank you for the consult.

## 2018-12-07 NOTE — CARE PLAN
Problem: Knowledge Deficit  Goal: Knowledge of disease process/condition, treatment plan, diagnostic tests, and medications will improve  Outcome: PROGRESSING AS EXPECTED  Discussed POC and medications with pt, pt verbalized understanding.     Problem: Mobility  Goal: Risk for activity intolerance will decrease  Outcome: PROGRESSING AS EXPECTED

## 2018-12-07 NOTE — PROGRESS NOTES
2 RN skin check completed with Christin PÉREZ    Bilateral elbows red, blanching.   Pannus fold, groin moisture related redness. Interdries in place.   Bilateral lower extremity swelling, heels red, blanching. Missing big toe, right foot. Scab on 2nd toe right foot. Blister on outside of left foot.   Bottom intact.

## 2018-12-07 NOTE — PROGRESS NOTES
Hospital Medicine Daily Progress Note    Date of Service  12/7/2018    Chief Complaint  53 y.o. male admitted 11/21/2018 with acute on chronic sCHF exacerbation    Hospital Course  Presented with complaints of leg swelling, and 40 pound weight gain with bilateral leg edema, increased swelling consistent with exacerbation of congestive heart failure patient was diuresed but then had decreased sodium level and severe orthostasis there was concern for possible seizure activity however EEG was negative in November.    Interval Problem Update  Doing well today, explained results of EEG again and he doesn't seem to be phased or have additional questions. Became severely orthostatic yesterday evening but no LOC as he was able to lay back down.     Consultants/Specialty  Cards  Neurology    Code Status  Full    Disposition  Home pending medical clearance.     Review of Systems  Review of Systems   Constitutional: Positive for malaise/fatigue. Negative for chills, diaphoresis and fever.   HENT: Negative for congestion, hearing loss and sore throat.    Eyes: Negative for discharge.   Respiratory: Negative for shortness of breath.    Cardiovascular: Positive for leg swelling (same). Negative for chest pain and palpitations.   Gastrointestinal: Negative for abdominal pain, blood in stool, nausea and vomiting.   Genitourinary: Negative for dysuria.   Musculoskeletal: Negative for falls and joint pain.   Neurological: Positive for dizziness (Orthostatic), tremors and weakness (improved). Negative for focal weakness, seizures, loss of consciousness and headaches.   Psychiatric/Behavioral: Positive for depression. Negative for hallucinations. The patient is not nervous/anxious.         Physical Exam  Temp:  [36.4 °C (97.6 °F)-36.6 °C (97.9 °F)] 36.4 °C (97.6 °F)  Pulse:  [53-72] 55  Resp:  [16-18] 18  BP: ()/(40-84) 116/61    Physical Exam   Constitutional: He is oriented to person, place, and time. He appears well-developed.  No distress.   HENT:   Head: Normocephalic.   Mouth/Throat: Oropharynx is clear and moist.   Eyes: EOM are normal. Right eye exhibits no discharge. Left eye exhibits no discharge.   Neck: Normal range of motion. No JVD present.   Cardiovascular: Normal rate, regular rhythm and intact distal pulses.    No murmur heard.  Pulmonary/Chest: Effort normal. No stridor. No respiratory distress.   Abdominal: Soft. He exhibits no distension. There is no tenderness.   Musculoskeletal: Normal range of motion. He exhibits no tenderness. Edema: compression stockings in place.   Neurological: He is alert and oriented to person, place, and time. He has normal strength. No sensory deficit.   Skin: Skin is warm and dry. No rash noted. He is not diaphoretic.   Psychiatric: His speech is delayed. He is slowed.   Flat/odd affect but answers questions appropriately    Vitals reviewed.      Fluids  No intake or output data in the 24 hours ending 12/07/18 0704    Laboratory  Recent Labs      12/04/18   1028   WBC  7.8   RBC  5.60   HEMOGLOBIN  15.8   HEMATOCRIT  48.0   MCV  85.7   MCH  28.2   MCHC  32.9*   RDW  41.3   PLATELETCT  255   MPV  10.3     Recent Labs      12/04/18   1028  12/05/18   0232   SODIUM  136  135   POTASSIUM  4.3  4.3   CHLORIDE  102  103   CO2  29  25   GLUCOSE  99  104*   BUN  28*  30*   CREATININE  0.90  0.89   CALCIUM  9.5  9.5                   Imaging  CT-HEAD W/O   Final Result      No acute intracranial abnormality is identified.      Paranasal sinus disease.      CT-HEAD W/O   Final Result      No acute intracranial abnormality is identified.      Paranasal sinus disease.      EC-ECHOCARDIOGRAM COMPLETE W/ CONT   Final Result      EC-ECHOCARDIOGRAM COMPLETE W/O CONT         DX-CHEST-PORTABLE (1 VIEW)   Final Result      1.  Hypoinflation without other evidence for acute cardiopulmonary disease.   2.  Stable cardiomegaly.           Assessment/Plan  Orthostatic hypotension- (present on admission)   Assessment &  Plan    Suspect autonomic dysfunction secondary to his diabetes.  No significant electrolyte abnormalities to suggest other etiologies.  - neurology evaluated, greatly appreciate  - continue with midodrine and mestinon, with holding parameters for elevated BPs  - continue florinef  - possibly start on droxidopa outpatient with PCP, per neuro recs  - amiodarone discontinued per neuro recs, cards aware  - lifestyle modification such as increased water intake, abdominal or lower extremity compression     Psychogenic nonepileptic seizure- (present on admission)   Assessment & Plan    Discussed with neurology again, does not appear to be orthostatic tremors as he's not orthostatic during the episodes today. No evidence that they are epileptic in nature.  EEG from 12/5 negative for epileptiform discharges during his episodes. NO ATIVAN  - neurology evaluated, greatly appreciate (signed off)  - s/p EEG on 11/26 and 12/5 negative  - psych following, greatly appreciate  - monitor clinically     Acute on chronic diastolic heart failure (HCC)- (present on admission)   Assessment & Plan    ECHO with EF 60%. Given diastolic dysfunction and A fib, likely an element of lost atrial kick  - s/p 11/26 DCCV for atrial fibrillation  - still orthostatic despite cardioversion and return to sinus rhythm     Syncope- (present on admission)   Assessment & Plan    Remains orthostatic, probable autonomic dysfunction  - management as above  - Discharge on hold pending resolution or at least acceptable levels of hypotension     Paroxysmal A-fib (HCC)- (present on admission)   Assessment & Plan    Rate controlled, s/p electrical cardioversion. Now sinus rhythm  - continue xarelto  -Outpatient cardiology follow-up     Essential hypertension- (present on admission)   Assessment & Plan    BP has been relatively stable but continues to be orthostatic with standing.  Will accept a higher baseline blood pressure in order to avoid the low standing  blood pressures  - Not currenty on antihypertensives, highest 160's  - was previously taking metop and lisinopril     Mixed hyperlipidemia- (present on admission)   Assessment & Plan    Low-fat low-cholesterol diet.  Continue with statin.  Lab Results   Component Value Date/Time    CHOLSTRLTOT 104 11/22/2018 12:07 AM    LDL 60 11/22/2018 12:07 AM    HDL 27 (A) 11/22/2018 12:07 AM    TRIGLYCERIDE 87 11/22/2018 12:07 AM        GERD (gastroesophageal reflux disease)- (present on admission)   Assessment & Plan    Stable   -controlled on PPI     Type 2 diabetes mellitus, without long-term current use of insulin, with peripheral neuropathy (HCC)- (present on admission)   Assessment & Plan    A1C 6.3%, without hyperglycemia. Holding home metformin.  - Sugars are at goal and has not required correctional insulin  - intermittent BMPs          VTE prophylaxis: xarelto

## 2018-12-08 LAB
ANION GAP SERPL CALC-SCNC: 8 MMOL/L (ref 0–11.9)
BUN SERPL-MCNC: 36 MG/DL (ref 8–22)
CALCIUM SERPL-MCNC: 9.8 MG/DL (ref 8.5–10.5)
CHLORIDE SERPL-SCNC: 101 MMOL/L (ref 96–112)
CO2 SERPL-SCNC: 23 MMOL/L (ref 20–33)
CREAT SERPL-MCNC: 0.89 MG/DL (ref 0.5–1.4)
GLUCOSE SERPL-MCNC: 88 MG/DL (ref 65–99)
POTASSIUM SERPL-SCNC: 4 MMOL/L (ref 3.6–5.5)
SODIUM SERPL-SCNC: 132 MMOL/L (ref 135–145)

## 2018-12-08 PROCEDURE — A9270 NON-COVERED ITEM OR SERVICE: HCPCS | Performed by: PSYCHIATRY & NEUROLOGY

## 2018-12-08 PROCEDURE — 80048 BASIC METABOLIC PNL TOTAL CA: CPT

## 2018-12-08 PROCEDURE — 700102 HCHG RX REV CODE 250 W/ 637 OVERRIDE(OP): Performed by: PSYCHIATRY & NEUROLOGY

## 2018-12-08 PROCEDURE — A9270 NON-COVERED ITEM OR SERVICE: HCPCS | Performed by: HOSPITALIST

## 2018-12-08 PROCEDURE — 700102 HCHG RX REV CODE 250 W/ 637 OVERRIDE(OP): Performed by: HOSPITALIST

## 2018-12-08 PROCEDURE — 36415 COLL VENOUS BLD VENIPUNCTURE: CPT

## 2018-12-08 PROCEDURE — 770020 HCHG ROOM/CARE - TELE (206)

## 2018-12-08 PROCEDURE — 99231 SBSQ HOSP IP/OBS SF/LOW 25: CPT | Performed by: HOSPITALIST

## 2018-12-08 RX ADMIN — FLUOXETINE HYDROCHLORIDE 20 MG: 20 CAPSULE ORAL at 05:30

## 2018-12-08 RX ADMIN — PYRIDOSTIGMINE BROMIDE 60 MG: 60 TABLET ORAL at 18:27

## 2018-12-08 RX ADMIN — MIDODRINE HYDROCHLORIDE 10 MG: 5 TABLET ORAL at 11:48

## 2018-12-08 RX ADMIN — MIDODRINE HYDROCHLORIDE 10 MG: 5 TABLET ORAL at 05:30

## 2018-12-08 RX ADMIN — MIDODRINE HYDROCHLORIDE 10 MG: 5 TABLET ORAL at 18:27

## 2018-12-08 RX ADMIN — PYRIDOSTIGMINE BROMIDE 60 MG: 60 TABLET ORAL at 11:48

## 2018-12-08 RX ADMIN — PRAVASTATIN SODIUM 20 MG: 20 TABLET ORAL at 18:27

## 2018-12-08 RX ADMIN — NYSTATIN: 100000 POWDER TOPICAL at 11:47

## 2018-12-08 RX ADMIN — TRIAMCINOLONE ACETONIDE: 5 CREAM TOPICAL at 05:31

## 2018-12-08 RX ADMIN — NYSTATIN: 100000 POWDER TOPICAL at 05:31

## 2018-12-08 RX ADMIN — PYRIDOSTIGMINE BROMIDE 60 MG: 60 TABLET ORAL at 05:30

## 2018-12-08 RX ADMIN — RIVAROXABAN 20 MG: 20 TABLET, FILM COATED ORAL at 18:27

## 2018-12-08 RX ADMIN — FLUDROCORTISONE ACETATE 0.2 MG: 0.1 TABLET ORAL at 05:30

## 2018-12-08 RX ADMIN — NYSTATIN: 100000 POWDER TOPICAL at 18:27

## 2018-12-08 RX ADMIN — TRIAMCINOLONE ACETONIDE: 5 CREAM TOPICAL at 18:27

## 2018-12-08 ASSESSMENT — ENCOUNTER SYMPTOMS
DIAPHORESIS: 0
HALLUCINATIONS: 0
SORE THROAT: 0
CHILLS: 0
ABDOMINAL PAIN: 0
LOSS OF CONSCIOUSNESS: 0
EYE DISCHARGE: 0
DIZZINESS: 1
VOMITING: 0
TREMORS: 1
BLOOD IN STOOL: 0
WEAKNESS: 0
SHORTNESS OF BREATH: 0
HEADACHES: 0
SEIZURES: 0
FOCAL WEAKNESS: 0
PALPITATIONS: 0
NAUSEA: 0
FALLS: 0
NERVOUS/ANXIOUS: 0
DEPRESSION: 1
FEVER: 0

## 2018-12-08 ASSESSMENT — PAIN SCALES - GENERAL
PAINLEVEL_OUTOF10: 0

## 2018-12-08 NOTE — PROGRESS NOTES
Hospital Medicine Daily Progress Note    Date of Service  12/8/2018    Chief Complaint  53 y.o. male admitted 11/21/2018 with acute on chronic sCHF exacerbation    Hospital Course  Presented with complaints of leg swelling, and 40 pound weight gain with bilateral leg edema, increased swelling consistent with exacerbation of congestive heart failure patient was diuresed but then had decreased sodium level and severe orthostasis there was concern for possible seizure activity however EEG was negative in November.    Interval Problem Update  Girlfriend at the bedside, patient very happy to be sitting up in a chair. Has abdominal binder in place. No acute overnight events. Continues to be significantly orthostatic     Consultants/Specialty  Cards  Neurology    Code Status  Full    Disposition  Home pending medical clearance.     Review of Systems  Review of Systems   Constitutional: Negative for chills, diaphoresis, fever and malaise/fatigue.   HENT: Negative for congestion, hearing loss and sore throat.    Eyes: Negative for discharge.   Respiratory: Negative for shortness of breath.    Cardiovascular: Positive for leg swelling (same). Negative for chest pain and palpitations.   Gastrointestinal: Negative for abdominal pain, blood in stool, nausea and vomiting.   Genitourinary: Negative for dysuria.   Musculoskeletal: Negative for falls and joint pain.   Neurological: Positive for dizziness (Orthostatic) and tremors. Negative for focal weakness, seizures, loss of consciousness, weakness and headaches.   Psychiatric/Behavioral: Positive for depression. Negative for hallucinations. The patient is not nervous/anxious.         Physical Exam  Temp:  [36.4 °C (97.5 °F)-36.8 °C (98.3 °F)] 36.4 °C (97.5 °F)  Pulse:  [] 69  Resp:  [16-20] 18  BP: ()/(40-91) 135/75    Physical Exam   Constitutional: He is oriented to person, place, and time. He appears well-developed. No distress.   HENT:   Head: Normocephalic.    Mouth/Throat: Oropharynx is clear and moist.   Eyes: EOM are normal. Right eye exhibits no discharge. Left eye exhibits no discharge.   Neck: Normal range of motion. No tracheal deviation present.   Cardiovascular: Normal rate, regular rhythm and intact distal pulses.    No murmur heard.  Pulmonary/Chest: Effort normal and breath sounds normal. No stridor. No respiratory distress. He has no rales.   Abdominal: There is no tenderness.   Abdominal binder in place   Musculoskeletal: Normal range of motion. He exhibits no tenderness. Edema: compression stockings in place.   Neurological: He is alert and oriented to person, place, and time. He has normal strength. No sensory deficit.   Skin: Skin is warm and dry. No rash noted. He is not diaphoretic.   Psychiatric: His speech is delayed. He is slowed.   Flat/odd affect but answers questions appropriately    Vitals reviewed.      Fluids  No intake or output data in the 24 hours ending 12/08/18 0709    Laboratory      Recent Labs      12/08/18   0205   SODIUM  132*   POTASSIUM  4.0   CHLORIDE  101   CO2  23   GLUCOSE  88   BUN  36*   CREATININE  0.89   CALCIUM  9.8                   Imaging  CT-HEAD W/O   Final Result      No acute intracranial abnormality is identified.      Paranasal sinus disease.      CT-HEAD W/O   Final Result      No acute intracranial abnormality is identified.      Paranasal sinus disease.      EC-ECHOCARDIOGRAM COMPLETE W/ CONT   Final Result      EC-ECHOCARDIOGRAM COMPLETE W/O CONT         DX-CHEST-PORTABLE (1 VIEW)   Final Result      1.  Hypoinflation without other evidence for acute cardiopulmonary disease.   2.  Stable cardiomegaly.           Assessment/Plan  Orthostatic hypotension- (present on admission)   Assessment & Plan    Suspect autonomic dysfunction secondary to his diabetes.  No significant electrolyte abnormalities to suggest other etiologies.  - neurology evaluated, greatly appreciate  - continue with midodrine and mestinon,  with holding parameters for elevated BPs  - continue florinef  - possibly start on droxidopa outpatient with PCP, per neuro recs  - amiodarone discontinued per neuro recs, cards aware  - lifestyle modification such as increased water intake, abdominal or lower extremity compression     Psychogenic nonepileptic seizure- (present on admission)   Assessment & Plan    Discussed with neurology again, does not appear to be orthostatic tremors as he's not orthostatic during the episodes today. No evidence that they are epileptic in nature.  EEG from 12/5 negative for epileptiform discharges during his episodes. NO ATIVAN  - neurology evaluated, greatly appreciate (signed off)  - s/p EEG on 11/26 and 12/5 negative  - psych following, greatly appreciate  - monitor clinically     Acute on chronic diastolic heart failure (HCC)- (present on admission)   Assessment & Plan    ECHO with EF 60%. Given diastolic dysfunction and A fib, likely an element of lost atrial kick  - s/p 11/26 DCCV for atrial fibrillation  - still orthostatic despite cardioversion and return to sinus rhythm     Syncope- (present on admission)   Assessment & Plan    Remains orthostatic, probable autonomic dysfunction  - management as above  - Discharge on hold pending resolution or at least acceptable levels of hypotension     Paroxysmal A-fib (HCC)- (present on admission)   Assessment & Plan    Rate controlled, s/p electrical cardioversion. Now sinus rhythm  - continue xarelto  -Outpatient cardiology follow-up     Essential hypertension- (present on admission)   Assessment & Plan    BP has been relatively stable but continues to be orthostatic with standing.  Will accept a higher baseline blood pressure in order to avoid the low standing blood pressures  - Not currenty on antihypertensives, highest 160's  - was previously taking metop and lisinopril     Mixed hyperlipidemia- (present on admission)   Assessment & Plan    Low-fat low-cholesterol diet.   Continue with statin.  Lab Results   Component Value Date/Time    CHOLSTRLTOT 104 11/22/2018 12:07 AM    LDL 60 11/22/2018 12:07 AM    HDL 27 (A) 11/22/2018 12:07 AM    TRIGLYCERIDE 87 11/22/2018 12:07 AM        GERD (gastroesophageal reflux disease)- (present on admission)   Assessment & Plan    Stable   -controlled on PPI     Type 2 diabetes mellitus, without long-term current use of insulin, with peripheral neuropathy (HCC)- (present on admission)   Assessment & Plan    A1C 6.3%, without hyperglycemia. Holding home metformin.  - Sugars are at goal and has not required correctional insulin  - intermittent BMPs          VTE prophylaxis: xarelto

## 2018-12-08 NOTE — PROGRESS NOTES
Bedside report received from ELISA Nguyen. POC discussed with pt; all questions answered at this time. White board updated. Appropriate functioning of tele monitor confirmed. All needs met at this time.

## 2018-12-09 PROCEDURE — G8978 MOBILITY CURRENT STATUS: HCPCS | Mod: CJ

## 2018-12-09 PROCEDURE — A9270 NON-COVERED ITEM OR SERVICE: HCPCS | Performed by: PSYCHIATRY & NEUROLOGY

## 2018-12-09 PROCEDURE — 770006 HCHG ROOM/CARE - MED/SURG/GYN SEMI*

## 2018-12-09 PROCEDURE — 97164 PT RE-EVAL EST PLAN CARE: CPT

## 2018-12-09 PROCEDURE — 700102 HCHG RX REV CODE 250 W/ 637 OVERRIDE(OP): Performed by: PSYCHIATRY & NEUROLOGY

## 2018-12-09 PROCEDURE — G8979 MOBILITY GOAL STATUS: HCPCS | Mod: CI

## 2018-12-09 PROCEDURE — A9270 NON-COVERED ITEM OR SERVICE: HCPCS | Performed by: HOSPITALIST

## 2018-12-09 PROCEDURE — 99231 SBSQ HOSP IP/OBS SF/LOW 25: CPT | Performed by: HOSPITALIST

## 2018-12-09 PROCEDURE — 700102 HCHG RX REV CODE 250 W/ 637 OVERRIDE(OP): Performed by: HOSPITALIST

## 2018-12-09 RX ADMIN — PYRIDOSTIGMINE BROMIDE 60 MG: 60 TABLET ORAL at 18:15

## 2018-12-09 RX ADMIN — PYRIDOSTIGMINE BROMIDE 60 MG: 60 TABLET ORAL at 06:33

## 2018-12-09 RX ADMIN — RIVAROXABAN 20 MG: 20 TABLET, FILM COATED ORAL at 17:31

## 2018-12-09 RX ADMIN — PRAVASTATIN SODIUM 20 MG: 20 TABLET ORAL at 17:31

## 2018-12-09 RX ADMIN — FLUOXETINE HYDROCHLORIDE 20 MG: 20 CAPSULE ORAL at 06:31

## 2018-12-09 RX ADMIN — NYSTATIN: 100000 POWDER TOPICAL at 11:45

## 2018-12-09 RX ADMIN — NYSTATIN: 100000 POWDER TOPICAL at 06:34

## 2018-12-09 RX ADMIN — MIDODRINE HYDROCHLORIDE 10 MG: 5 TABLET ORAL at 11:45

## 2018-12-09 RX ADMIN — TRIAMCINOLONE ACETONIDE: 5 CREAM TOPICAL at 17:31

## 2018-12-09 RX ADMIN — MIDODRINE HYDROCHLORIDE 10 MG: 5 TABLET ORAL at 06:31

## 2018-12-09 RX ADMIN — NYSTATIN: 100000 POWDER TOPICAL at 17:31

## 2018-12-09 RX ADMIN — TRIAMCINOLONE ACETONIDE: 5 CREAM TOPICAL at 06:34

## 2018-12-09 RX ADMIN — MIDODRINE HYDROCHLORIDE 10 MG: 5 TABLET ORAL at 18:15

## 2018-12-09 RX ADMIN — FLUDROCORTISONE ACETATE 0.2 MG: 0.1 TABLET ORAL at 06:31

## 2018-12-09 RX ADMIN — PYRIDOSTIGMINE BROMIDE 60 MG: 60 TABLET ORAL at 11:45

## 2018-12-09 ASSESSMENT — ENCOUNTER SYMPTOMS
HEADACHES: 0
VOMITING: 0
EYE DISCHARGE: 0
CHILLS: 0
SEIZURES: 0
BLOOD IN STOOL: 0
LOSS OF CONSCIOUSNESS: 0
WEAKNESS: 0
DIZZINESS: 1
NAUSEA: 0
PALPITATIONS: 0
FOCAL WEAKNESS: 0
FALLS: 0
DEPRESSION: 1
TREMORS: 0
FEVER: 0
NERVOUS/ANXIOUS: 0
SORE THROAT: 0
SHORTNESS OF BREATH: 0
HALLUCINATIONS: 0
ABDOMINAL PAIN: 0
DIAPHORESIS: 0

## 2018-12-09 ASSESSMENT — PAIN SCALES - GENERAL
PAINLEVEL_OUTOF10: 0

## 2018-12-09 ASSESSMENT — GAIT ASSESSMENTS
DEVIATION: DECREASED BASE OF SUPPORT;SHUFFLED GAIT
GAIT LEVEL OF ASSIST: STAND BY ASSIST
DISTANCE (FEET): 75
ASSISTIVE DEVICE: FRONT WHEEL WALKER

## 2018-12-09 NOTE — THERAPY
"Physical Therapy Re-Evaluation completed.   Bed Mobility:  Supine to Sit:  (NT, pt sitting up in chair)  Transfers: Sit to Stand: Stand by Assist  Gait: Level Of Assist: Stand by Assist with Front-Wheel Walker       Plan of Care: Will benefit from Physical Therapy 3 times per week  Discharge Recommendations: Equipment: No Equipment Needed. Post-acute therapy Discharge to home with outpatient or home health for additional skilled therapy services.    PT re-eval completed. Pt has had limited mobility due to orthostatic BP since previous PT visit over 1 week ago and thus pt has also gotten weaker, relying on FWW for mobility. Currently, pts mobility is still significantly limited by orthostatic BP, today limited to about ~75 ft of ambulation.  Before ambulation /83 sitting, pt became symtomatic toward end of ambulation- BP checked 82/62 standing. Returned to chair, symptoms resolved. Anticipate once orthostatic BP resolves pt will be able to return home, recommend pt use FWW at this time which pt is agreeable, recommend HHPT.     See \"Rehab Therapy-Acute\" Patient Summary Report for complete documentation.     "

## 2018-12-09 NOTE — PROGRESS NOTES
Hospital Medicine Daily Progress Note    Date of Service  12/9/2018    Chief Complaint  53 y.o. male admitted 11/21/2018 with acute on chronic sCHF exacerbation    Hospital Course  Presented with complaints of leg swelling, and 40 pound weight gain with bilateral leg edema, increased swelling consistent with exacerbation of congestive heart failure patient was diuresed but then had decreased sodium level and severe orthostasis there was concern for possible seizure activity however EEG was negative in November.    Interval Problem Update  Sitting up in the chair today, has abdominal binder in place. No acute overnight events. Continues to be significantly orthostatic.  PT to work with him today.    Consultants/Specialty  Cards  Neurology    Code Status  Full    Disposition  Home pending medical clearance.     Review of Systems  Review of Systems   Constitutional: Negative for chills, diaphoresis, fever and malaise/fatigue (Resolved).   HENT: Negative for congestion, hearing loss and sore throat.    Eyes: Negative for discharge.   Respiratory: Negative for shortness of breath.    Cardiovascular: Positive for leg swelling (same). Negative for chest pain and palpitations.   Gastrointestinal: Negative for abdominal pain, blood in stool, nausea and vomiting.   Genitourinary: Negative for dysuria.   Musculoskeletal: Negative for falls and joint pain.   Neurological: Positive for dizziness (Orthostatic). Negative for tremors, focal weakness, seizures, loss of consciousness, weakness and headaches.   Psychiatric/Behavioral: Positive for depression. Negative for hallucinations. The patient is not nervous/anxious.         Physical Exam  Temp:  [36.3 °C (97.3 °F)-37.2 °C (98.9 °F)] 37.2 °C (98.9 °F)  Pulse:  [53-58] 56  Resp:  [12-20] 16  BP: ()/(46-88) 119/63    Physical Exam   Constitutional: He is oriented to person, place, and time. He appears well-developed. No distress.   HENT:   Head: Normocephalic.    Mouth/Throat: Oropharynx is clear and moist.   Eyes: EOM are normal. Right eye exhibits no discharge. Left eye exhibits no discharge.   Neck: Normal range of motion. No tracheal deviation present.   Cardiovascular: Normal rate, regular rhythm and intact distal pulses.    No murmur heard.  Pulmonary/Chest: Effort normal. No stridor. No respiratory distress. He has no rales.   Abdominal: There is no tenderness.   Abdominal binder in place   Musculoskeletal: Normal range of motion. He exhibits edema (compression stockings in place). He exhibits no tenderness.   Neurological: He is alert and oriented to person, place, and time. He has normal strength. No sensory deficit.   Skin: Skin is warm and dry. No rash noted. He is not diaphoretic.   Psychiatric: His speech is delayed. He is slowed.   Flat/odd affect but answers questions appropriately    Vitals reviewed.      Fluids    Intake/Output Summary (Last 24 hours) at 12/09/18 0704  Last data filed at 12/09/18 0600   Gross per 24 hour   Intake              480 ml   Output             1175 ml   Net             -695 ml       Laboratory      Recent Labs      12/08/18   0205   SODIUM  132*   POTASSIUM  4.0   CHLORIDE  101   CO2  23   GLUCOSE  88   BUN  36*   CREATININE  0.89   CALCIUM  9.8                   Imaging  CT-HEAD W/O   Final Result      No acute intracranial abnormality is identified.      Paranasal sinus disease.      CT-HEAD W/O   Final Result      No acute intracranial abnormality is identified.      Paranasal sinus disease.      EC-ECHOCARDIOGRAM COMPLETE W/ CONT   Final Result      EC-ECHOCARDIOGRAM COMPLETE W/O CONT         DX-CHEST-PORTABLE (1 VIEW)   Final Result      1.  Hypoinflation without other evidence for acute cardiopulmonary disease.   2.  Stable cardiomegaly.           Assessment/Plan  Orthostatic hypotension- (present on admission)   Assessment & Plan    Suspect autonomic dysfunction secondary to his diabetes.  No significant electrolyte  abnormalities to suggest other etiologies.  - neurology evaluated, greatly appreciate (signed off)  - continue with midodrine and mestinon, with holding parameters for elevated BPs  - continue florinef  - possibly start on droxidopa outpatient with PCP, per neuro recs  - amiodarone discontinued per neuro recs, cards aware  - lifestyle modification such as increased water intake, abdominal or lower extremity compression     Psychogenic nonepileptic seizure- (present on admission)   Assessment & Plan    Discussed with neurology again, does not appear to be orthostatic tremors as he's not orthostatic during the episodes today. No evidence that they are epileptic in nature.  EEG from 12/5 negative for epileptiform discharges during his episodes. NO ATIVAN  - neurology evaluated, greatly appreciate (signed off)  - s/p EEG on 11/26 and 12/5 negative  - psych following, greatly appreciate  - Started on Prozac and given community resources for PTSD  - monitor clinically     Acute on chronic diastolic heart failure (HCC)- (present on admission)   Assessment & Plan    ECHO with EF 60%. Given diastolic dysfunction and A fib, likely an element of lost atrial kick.  Still in sinus rhythm  - s/p 11/26 DCCV for atrial fibrillation  - still orthostatic despite cardioversion and return to sinus rhythm     Syncope- (present on admission)   Assessment & Plan    Remains orthostatic, probable autonomic dysfunction  - management as above  - Discharge on hold pending resolution or at least acceptable levels of hypotension     Paroxysmal A-fib (HCC)- (present on admission)   Assessment & Plan    Rate controlled, s/p electrical cardioversion. Now sinus rhythm  - continue xarelto  - outpatient cardiology follow-up     Essential hypertension- (present on admission)   Assessment & Plan    BP has been relatively stable but continues to be orthostatic with standing.  Will accept a higher baseline blood pressure in order to avoid the low standing  blood pressures  - Not currenty on antihypertensives, highest 160's  - was previously taking metop and lisinopril     Mixed hyperlipidemia- (present on admission)   Assessment & Plan    - Low-fat low-cholesterol diet.    - Continue with statin.     GERD (gastroesophageal reflux disease)- (present on admission)   Assessment & Plan    Stable   -controlled on PPI     Type 2 diabetes mellitus, without long-term current use of insulin, with peripheral neuropathy (HCC)- (present on admission)   Assessment & Plan    A1C 6.3%, without hyperglycemia. Holding home metformin.  - Sugars are at goal and has not required correctional insulin  - intermittent BMPs, last glucose was 88          VTE prophylaxis: xarelto

## 2018-12-09 NOTE — CARE PLAN
Problem: Knowledge Deficit  Goal: Knowledge of disease process/condition, treatment plan, diagnostic tests, and medications will improve  Outcome: PROGRESSING AS EXPECTED  Discussed POC and medications with pt, pt verbalized understanding.

## 2018-12-09 NOTE — CARE PLAN
Problem: Safety  Goal: Will remain free from injury  Outcome: PROGRESSING AS EXPECTED  Bed locked and lowest position. Bed alarm on. Treaded socks on. Call light and belongings within reach. Pt educated to call for assistance. Pt verbalized understanding. Hourly rounding in place.

## 2018-12-09 NOTE — CARE PLAN
Problem: Communication  Goal: The ability to communicate needs accurately and effectively will improve  Outcome: PROGRESSING AS EXPECTED      Problem: Safety  Goal: Will remain free from injury  Outcome: PROGRESSING AS EXPECTED    Goal: Will remain free from falls  Outcome: PROGRESSING SLOWER THAN EXPECTED

## 2018-12-10 LAB
ANION GAP SERPL CALC-SCNC: 7 MMOL/L (ref 0–11.9)
BUN SERPL-MCNC: 32 MG/DL (ref 8–22)
CALCIUM SERPL-MCNC: 9.4 MG/DL (ref 8.5–10.5)
CHLORIDE SERPL-SCNC: 102 MMOL/L (ref 96–112)
CO2 SERPL-SCNC: 27 MMOL/L (ref 20–33)
CREAT SERPL-MCNC: 0.75 MG/DL (ref 0.5–1.4)
GLUCOSE SERPL-MCNC: 99 MG/DL (ref 65–99)
POTASSIUM SERPL-SCNC: 4 MMOL/L (ref 3.6–5.5)
SODIUM SERPL-SCNC: 136 MMOL/L (ref 135–145)

## 2018-12-10 PROCEDURE — 99231 SBSQ HOSP IP/OBS SF/LOW 25: CPT | Performed by: HOSPITALIST

## 2018-12-10 PROCEDURE — 700102 HCHG RX REV CODE 250 W/ 637 OVERRIDE(OP): Performed by: PSYCHIATRY & NEUROLOGY

## 2018-12-10 PROCEDURE — 36415 COLL VENOUS BLD VENIPUNCTURE: CPT

## 2018-12-10 PROCEDURE — A9270 NON-COVERED ITEM OR SERVICE: HCPCS | Performed by: HOSPITALIST

## 2018-12-10 PROCEDURE — 770006 HCHG ROOM/CARE - MED/SURG/GYN SEMI*

## 2018-12-10 PROCEDURE — A9270 NON-COVERED ITEM OR SERVICE: HCPCS | Performed by: PSYCHIATRY & NEUROLOGY

## 2018-12-10 PROCEDURE — 97535 SELF CARE MNGMENT TRAINING: CPT

## 2018-12-10 PROCEDURE — 306312 ANTI-EMBOLISM STOCKINGS XXXLG LNG: Performed by: HOSPITALIST

## 2018-12-10 PROCEDURE — 700102 HCHG RX REV CODE 250 W/ 637 OVERRIDE(OP): Performed by: HOSPITALIST

## 2018-12-10 PROCEDURE — 80048 BASIC METABOLIC PNL TOTAL CA: CPT

## 2018-12-10 RX ADMIN — MIDODRINE HYDROCHLORIDE 10 MG: 5 TABLET ORAL at 05:57

## 2018-12-10 RX ADMIN — PYRIDOSTIGMINE BROMIDE 60 MG: 60 TABLET ORAL at 17:34

## 2018-12-10 RX ADMIN — MIDODRINE HYDROCHLORIDE 10 MG: 5 TABLET ORAL at 12:11

## 2018-12-10 RX ADMIN — RIVAROXABAN 20 MG: 20 TABLET, FILM COATED ORAL at 17:34

## 2018-12-10 RX ADMIN — NYSTATIN: 100000 POWDER TOPICAL at 17:34

## 2018-12-10 RX ADMIN — PYRIDOSTIGMINE BROMIDE 60 MG: 60 TABLET ORAL at 12:11

## 2018-12-10 RX ADMIN — PRAVASTATIN SODIUM 20 MG: 20 TABLET ORAL at 17:33

## 2018-12-10 RX ADMIN — FLUDROCORTISONE ACETATE 0.2 MG: 0.1 TABLET ORAL at 05:57

## 2018-12-10 RX ADMIN — TRIAMCINOLONE ACETONIDE: 5 CREAM TOPICAL at 05:57

## 2018-12-10 RX ADMIN — NYSTATIN: 100000 POWDER TOPICAL at 12:11

## 2018-12-10 RX ADMIN — STANDARDIZED SENNA CONCENTRATE AND DOCUSATE SODIUM 2 TABLET: 8.6; 5 TABLET, FILM COATED ORAL at 17:33

## 2018-12-10 RX ADMIN — FLUOXETINE HYDROCHLORIDE 20 MG: 20 CAPSULE ORAL at 05:57

## 2018-12-10 RX ADMIN — MIDODRINE HYDROCHLORIDE 10 MG: 5 TABLET ORAL at 17:34

## 2018-12-10 RX ADMIN — PYRIDOSTIGMINE BROMIDE 60 MG: 60 TABLET ORAL at 05:57

## 2018-12-10 RX ADMIN — NYSTATIN: 100000 POWDER TOPICAL at 05:57

## 2018-12-10 ASSESSMENT — ENCOUNTER SYMPTOMS
NAUSEA: 0
DEPRESSION: 1
DIZZINESS: 1
EYE DISCHARGE: 0
NERVOUS/ANXIOUS: 0
FALLS: 0
WEAKNESS: 0
CHILLS: 0
HALLUCINATIONS: 0
SHORTNESS OF BREATH: 0
VOMITING: 0
SORE THROAT: 0
DIAPHORESIS: 0
HEADACHES: 0
FOCAL WEAKNESS: 0
TREMORS: 0
ABDOMINAL PAIN: 0
BLOOD IN STOOL: 0
SEIZURES: 0
PALPITATIONS: 0
FEVER: 0

## 2018-12-10 ASSESSMENT — PAIN SCALES - GENERAL
PAINLEVEL_OUTOF10: 0
PAINLEVEL_OUTOF10: 0

## 2018-12-10 ASSESSMENT — COGNITIVE AND FUNCTIONAL STATUS - GENERAL
HELP NEEDED FOR BATHING: A LITTLE
TOILETING: A LITTLE
SUGGESTED CMS G CODE MODIFIER DAILY ACTIVITY: CJ
PERSONAL GROOMING: A LITTLE
DAILY ACTIVITIY SCORE: 21

## 2018-12-10 NOTE — PROGRESS NOTES
Assumed care of patient, bedside report received from ELISA Olivera. Updated on POC, call light within reach and fall precautions in place. Bed locked and in lowest position. Patient instructed to call for assistance before getting out of bed. All questions answered, no other needs at this time.

## 2018-12-10 NOTE — PROGRESS NOTES
Hospital Medicine Daily Progress Note    Date of Service  12/10/2018    Chief Complaint  53 y.o. male admitted 11/21/2018 with acute on chronic sCHF exacerbation    Hospital Course  Presented with complaints of leg swelling, and 40 pound weight gain with bilateral leg edema, increased swelling consistent with exacerbation of congestive heart failure patient was diuresed but then had decreased sodium level and severe orthostasis there was concern for possible seizure activity however EEG was negative in November.    Interval Problem Update  In bed, no acute distress. Anxious about going home. SW to look into his living situation bc not much else we can do for his orthostatic hypotension.    Consultants/Specialty  Cards  Neurology    Code Status  Full    Disposition  Home pending safe dispo.    Review of Systems  Review of Systems   Constitutional: Negative for chills, diaphoresis, fever and malaise/fatigue (Resolved).   HENT: Negative for congestion, hearing loss and sore throat.    Eyes: Negative for discharge.   Respiratory: Negative for shortness of breath.    Cardiovascular: Positive for leg swelling (same). Negative for chest pain and palpitations.   Gastrointestinal: Negative for abdominal pain, blood in stool, nausea and vomiting.   Genitourinary: Negative for dysuria.   Musculoskeletal: Negative for falls and joint pain.   Neurological: Positive for dizziness (Orthostatic). Negative for tremors, focal weakness, seizures, weakness and headaches.   Psychiatric/Behavioral: Positive for depression. Negative for hallucinations. The patient is not nervous/anxious.         Physical Exam  Temp:  [36.3 °C (97.4 °F)-36.8 °C (98.3 °F)] 36.3 °C (97.4 °F)  Pulse:  [52-69] 65  Resp:  [16-18] 16  BP: ()/(53-92) 137/82    Physical Exam   Constitutional: He is oriented to person, place, and time. He appears well-developed. No distress.   HENT:   Head: Normocephalic.   Mouth/Throat: Oropharynx is clear and moist.   Eyes:  EOM are normal. Right eye exhibits no discharge. Left eye exhibits no discharge.   Neck: Normal range of motion. No tracheal deviation present.   Cardiovascular: Normal rate, regular rhythm and intact distal pulses.    No murmur heard.  Pulmonary/Chest: Effort normal. No stridor. No respiratory distress. He has no rales.   Abdominal: There is no tenderness.   Abdominal binder in place   Musculoskeletal: Normal range of motion. He exhibits edema (compression stockings in place). He exhibits no tenderness.   Neurological: He is alert and oriented to person, place, and time. He has normal strength. No sensory deficit.   Skin: Skin is warm and dry. No rash noted. He is not diaphoretic.   Psychiatric: His speech is delayed. He is slowed.   Flat/odd affect but answers questions appropriately    Vitals reviewed.  Patient examined, unchanged from 12/9    Fluids    Intake/Output Summary (Last 24 hours) at 12/10/18 0724  Last data filed at 12/10/18 0523   Gross per 24 hour   Intake                0 ml   Output             1130 ml   Net            -1130 ml       Laboratory      Recent Labs      12/08/18   0205  12/10/18   0023   SODIUM  132*  136   POTASSIUM  4.0  4.0   CHLORIDE  101  102   CO2  23  27   GLUCOSE  88  99   BUN  36*  32*   CREATININE  0.89  0.75   CALCIUM  9.8  9.4                   Imaging  CT-HEAD W/O   Final Result      No acute intracranial abnormality is identified.      Paranasal sinus disease.      CT-HEAD W/O   Final Result      No acute intracranial abnormality is identified.      Paranasal sinus disease.      EC-ECHOCARDIOGRAM COMPLETE W/ CONT   Final Result      EC-ECHOCARDIOGRAM COMPLETE W/O CONT         DX-CHEST-PORTABLE (1 VIEW)   Final Result      1.  Hypoinflation without other evidence for acute cardiopulmonary disease.   2.  Stable cardiomegaly.           Assessment/Plan  Orthostatic hypotension- (present on admission)   Assessment & Plan    Suspect autonomic dysfunction secondary to his  diabetes.  No significant electrolyte abnormalities to suggest other etiologies.  - neurology evaluated, greatly appreciate (signed off)  - continue with midodrine and mestinon, with holding parameters for elevated BPs  - continue florinef  - possibly start on droxidopa outpatient with PCP, per neuro recs  - amiodarone discontinued per neuro recs, cards aware  - lifestyle modification such as increased water intake, abdominal or lower extremity compression (minimal improvement)     Psychogenic nonepileptic seizure- (present on admission)   Assessment & Plan    Discussed with neurology again, does not appear to be orthostatic tremors as he's not orthostatic during the episodes today. No evidence that they are epileptic in nature.  EEG from 12/5 negative for epileptiform discharges during his episodes. NO ATIVAN  - neurology evaluated (signed off)  - s/p EEG on 11/26 and 12/5 negative  - psych following, greatly appreciate  - Started on Prozac and given community resources for PTSD  - monitor clinically     Acute on chronic diastolic heart failure (HCC)- (present on admission)   Assessment & Plan    ECHO with EF 60%. Given diastolic dysfunction and A fib, likely an element of lost atrial kick.  Still in sinus rhythm  - cardiology evaluated, signed off  - s/p 11/26 DCCV for atrial fibrillation  - still orthostatic despite cardioversion and return to sinus rhythm     Syncope- (present on admission)   Assessment & Plan    Remains orthostatic, probable autonomic dysfunction  - management as above  - Discharge on hold pending resolution or at least acceptable levels of hypotension     Paroxysmal A-fib (HCC)- (present on admission)   Assessment & Plan    Rate controlled, s/p electrical cardioversion. Now sinus rhythm  - continue xarelto  - outpatient cardiology follow-up     Essential hypertension- (present on admission)   Assessment & Plan    BP has been relatively stable but continues to be orthostatic with standing.   Will accept a higher baseline blood pressure in order to avoid the low standing blood pressures  - Not currenty on antihypertensives, highest 160's  - was previously taking metop and lisinopril     Mixed hyperlipidemia- (present on admission)   Assessment & Plan    - Low-fat low-cholesterol diet.    - Continue with statin.     GERD (gastroesophageal reflux disease)- (present on admission)   Assessment & Plan    Stable   -controlled on PPI     Type 2 diabetes mellitus, without long-term current use of insulin, with peripheral neuropathy (HCC)- (present on admission)   Assessment & Plan    A1C 6.3%, without hyperglycemia. Holding home metformin.  - Sugars are at goal and has not required correctional insulin  - intermittent BMPs, last glucose was 88          VTE prophylaxis: xarelto

## 2018-12-10 NOTE — PROGRESS NOTES
Bedside report received from NOC ELISA Mathis, pt is alert and awake and resting comfortably in bed. Bed is locked in lowest position with call light, belongings within reach, white board updated, and POC discussed. All needs met at this time.

## 2018-12-10 NOTE — THERAPY
"Occupational Therapy Treatment completed with focus on ADLs and ADL transfers.  Functional Status:  SPV LB dressing, SBA standing grooming ~2 min, SBA functional mobility w/ FWW  Plan of Care: Will benefit from Occupational Therapy 2 times per week  Discharge Recommendations:  Equipment Will Continue to Assess for Equipment Needs. Post-acute therapy Currently anticipate no further skilled therapy needs once patient is discharged from the inpatient setting.    See \"Rehab Therapy-Acute\" Patient Summary Report for complete documentation.     Pt seen for OT tx. Pt primarily limited by orthostatic hypotension. When seated BP was 100/63, after standing for 2 min BP was 65/47 w/ symptoms, pt recovered within 2 minutes after sitting. Pt demo'd impaired balance and endurance impacting safety during BADLs. Will continue to follow while in house.  "

## 2018-12-10 NOTE — HEART FAILURE PROGRAM
Cardiovascular Nurse Navigator () Advanced Heart Failure Program Inpatient Progress Note:    Patient still significantly orthostatic per Dr. Fang's note yesterday. Sebastián has an appointment with Dr. Larson at the [facility] clinic this Thursday, 12/13. If it starts to look like Sebastián won't be discharged by tomorrow, Tuesday, 12/11, this appointment should be pushed out.    Hospital schedulers are here seven days a week, [phone] and can be reached at extension [phone], they will handle the seven day follow up appointment for you if you call them!    Thank you and please call GAIL Manzanares with questions M-F

## 2018-12-10 NOTE — DISCHARGE PLANNING
Anticipated Discharge Disposition: Home with HHC    Action: LWS spoke with patient at bedside regarding d/c plan. Patient reports he can d/c with girlfriend who can provide transport. Patient stated they live in an apartment in town. Patient would like to have home health upon d/c. LSW informed him she would update [facility] when he is ready to d/c    Barriers to Discharge: none    Plan: LSW to monitor for d/c barriers

## 2018-12-10 NOTE — CARE PLAN
Problem: Safety  Goal: Will remain free from injury  Outcome: PROGRESSING AS EXPECTED  Moderate fall risk precautions in place. Pt demonstrates ability to call appropriately for assistance.     Problem: Infection  Goal: Will remain free from infection  Outcome: PROGRESSING AS EXPECTED  No s/s of infection at this time.

## 2018-12-11 ENCOUNTER — APPOINTMENT (OUTPATIENT)
Dept: VASCULAR LAB | Facility: MEDICAL CENTER | Age: 53
End: 2018-12-11
Payer: MEDICAID

## 2018-12-11 PROCEDURE — A6250 SKIN SEAL PROTECT MOISTURIZR: HCPCS | Performed by: HOSPITALIST

## 2018-12-11 PROCEDURE — A9270 NON-COVERED ITEM OR SERVICE: HCPCS | Performed by: INTERNAL MEDICINE

## 2018-12-11 PROCEDURE — A9270 NON-COVERED ITEM OR SERVICE: HCPCS | Performed by: HOSPITALIST

## 2018-12-11 PROCEDURE — A9270 NON-COVERED ITEM OR SERVICE: HCPCS | Performed by: PSYCHIATRY & NEUROLOGY

## 2018-12-11 PROCEDURE — 700102 HCHG RX REV CODE 250 W/ 637 OVERRIDE(OP): Performed by: HOSPITALIST

## 2018-12-11 PROCEDURE — 99232 SBSQ HOSP IP/OBS MODERATE 35: CPT | Performed by: HOSPITALIST

## 2018-12-11 PROCEDURE — 770006 HCHG ROOM/CARE - MED/SURG/GYN SEMI*

## 2018-12-11 PROCEDURE — 700102 HCHG RX REV CODE 250 W/ 637 OVERRIDE(OP): Performed by: PSYCHIATRY & NEUROLOGY

## 2018-12-11 PROCEDURE — 700102 HCHG RX REV CODE 250 W/ 637 OVERRIDE(OP): Performed by: INTERNAL MEDICINE

## 2018-12-11 PROCEDURE — 306311 ANTI-EMBOLISM STOCKINGS XXLRG LNG: Performed by: HOSPITALIST

## 2018-12-11 RX ORDER — SODIUM CHLORIDE 1 G/1
1 TABLET ORAL
Status: DISCONTINUED | OUTPATIENT
Start: 2018-12-11 | End: 2018-12-12

## 2018-12-11 RX ADMIN — RIVAROXABAN 20 MG: 20 TABLET, FILM COATED ORAL at 17:10

## 2018-12-11 RX ADMIN — PYRIDOSTIGMINE BROMIDE 60 MG: 60 TABLET ORAL at 17:10

## 2018-12-11 RX ADMIN — MIDODRINE HYDROCHLORIDE 10 MG: 5 TABLET ORAL at 13:16

## 2018-12-11 RX ADMIN — FLUDROCORTISONE ACETATE 0.2 MG: 0.1 TABLET ORAL at 06:10

## 2018-12-11 RX ADMIN — SODIUM CHLORIDE TAB 1 GM 1 G: 1 TAB at 17:10

## 2018-12-11 RX ADMIN — FLUOXETINE HYDROCHLORIDE 20 MG: 20 CAPSULE ORAL at 06:09

## 2018-12-11 RX ADMIN — NYSTATIN: 100000 POWDER TOPICAL at 13:17

## 2018-12-11 RX ADMIN — PYRIDOSTIGMINE BROMIDE 60 MG: 60 TABLET ORAL at 06:08

## 2018-12-11 RX ADMIN — MIDODRINE HYDROCHLORIDE 10 MG: 5 TABLET ORAL at 17:10

## 2018-12-11 RX ADMIN — MIDODRINE HYDROCHLORIDE 10 MG: 5 TABLET ORAL at 06:09

## 2018-12-11 RX ADMIN — NYSTATIN: 100000 POWDER TOPICAL at 17:09

## 2018-12-11 RX ADMIN — TRIAMCINOLONE ACETONIDE: 5 CREAM TOPICAL at 17:10

## 2018-12-11 RX ADMIN — TRIAMCINOLONE ACETONIDE: 5 CREAM TOPICAL at 06:09

## 2018-12-11 RX ADMIN — NYSTATIN: 100000 POWDER TOPICAL at 06:09

## 2018-12-11 RX ADMIN — PRAVASTATIN SODIUM 20 MG: 20 TABLET ORAL at 17:09

## 2018-12-11 RX ADMIN — PYRIDOSTIGMINE BROMIDE 60 MG: 60 TABLET ORAL at 13:16

## 2018-12-11 ASSESSMENT — PAIN SCALES - GENERAL
PAINLEVEL_OUTOF10: 0

## 2018-12-11 ASSESSMENT — ENCOUNTER SYMPTOMS
TREMORS: 0
VOMITING: 0
FOCAL WEAKNESS: 0
DIZZINESS: 1
SEIZURES: 0
WEAKNESS: 1
CHILLS: 0
ABDOMINAL PAIN: 0
SHORTNESS OF BREATH: 0
NAUSEA: 0
HEADACHES: 0
DEPRESSION: 1
PALPITATIONS: 0
HALLUCINATIONS: 0
FALLS: 1
FEVER: 0
NERVOUS/ANXIOUS: 0

## 2018-12-11 NOTE — CARE PLAN
Problem: Mobility  Goal: Risk for activity intolerance will decrease  Outcome: PROGRESSING AS EXPECTED  Educated pt on options to help him remain active. Will take pt on stroll in WC around unit to prevent inactivity.

## 2018-12-11 NOTE — PROGRESS NOTES
Assumed care of patient, bedside report received from ELISA Lugo. Updated on POC, call light within reach and fall precautions in place. Bed locked and in lowest position. Patient instructed to call for assistance before getting out of bed. All questions answered, no other needs at this time.

## 2018-12-11 NOTE — PROGRESS NOTES
Hospital Medicine Daily Progress Note    Date of Service  12/11/2018    Chief Complaint  53 y.o. male admitted 11/21/2018 with shortness of breath and leg swelling in addition to 40 pounds of weight over 2 weeks    Hospital Course  Presented with complaints of leg swelling, and 40 pound weight gain with bilateral leg edema, increased swelling consistent with exacerbation of congestive heart failure patient was diuresed but then had decreased sodium level and severe orthostasis and postural hypotension has continued to be an issue.    ECHO performed 11/22:  Prior echo done 07/09/18, EF now normal.  Quality improved with contrast.  Left ventricular ejection fraction is visually estimated to be 60%.  Mild concentric left ventricular hypertrophy.  Mild mitral annular calcifcation, mild MR.  Mild tricuspid regurgitation.  Estimated right ventricular systolic pressure  is 30 mmHg.    Interval Problem Update  In bed, no acute distress although patient continues to be anxious about his hypotension.  Reports living in apartment with girlfriend, denies homelessness, has PCP.  Patient reports lightheadedness and some flushing which occurred earlier while sitting in chair, denies fevers or chills.  No worsening shortness of breath and no chest pain.  Patient does not feel medications currently are helping his hypotension.    Discussed with  at length today, home health has been set up, they can meet with patient within 1-2 days of discharge.    Consultants/Specialty  Cards  Neurology    Code Status  Full    Disposition  Likely home with home health, patient declines skilled rehab, needs safe discharge home however and continued postural hypotension will be an issue    Review of Systems  Review of Systems   Constitutional: Negative for chills, fever and malaise/fatigue (Resolved).   Respiratory: Negative for shortness of breath.    Cardiovascular: Positive for leg swelling (same). Negative for chest pain and  palpitations.   Gastrointestinal: Negative for abdominal pain, nausea and vomiting.   Genitourinary: Negative for dysuria.   Musculoskeletal: Positive for falls. Negative for joint pain.   Neurological: Positive for dizziness (Orthostatic) and weakness. Negative for tremors, focal weakness, seizures and headaches.   Psychiatric/Behavioral: Positive for depression. Negative for hallucinations. The patient is not nervous/anxious.    All other systems reviewed and are negative.       Physical Exam  Temp:  [36.8 °C (98.3 °F)-36.9 °C (98.4 °F)] 36.9 °C (98.4 °F)  Pulse:  [53-68] 68  Resp:  [16-18] 16  BP: ()/(60-85) 146/76    Physical Exam   Constitutional: He is oriented to person, place, and time. He appears well-developed. No distress.   HENT:   Head: Normocephalic and atraumatic.   Mouth/Throat: Oropharynx is clear and moist.   Eyes: Conjunctivae and EOM are normal. Right eye exhibits no discharge. Left eye exhibits no discharge.   Neck: Normal range of motion.   Cardiovascular: Normal rate, regular rhythm and intact distal pulses.    No murmur heard.  Pulmonary/Chest: Effort normal. No stridor. No respiratory distress. He has no wheezes. He has no rales.   Abdominal: Soft. He exhibits no distension. There is no tenderness.   Musculoskeletal: Normal range of motion. He exhibits no tenderness or deformity. Edema: compression stockings in place.   Neurological: He is alert and oriented to person, place, and time. He has normal strength. No sensory deficit.   Gait evaluation deferred due to hypotension   Skin: Skin is warm and dry. No rash noted. He is not diaphoretic. No erythema.   Psychiatric: He has a normal mood and affect. His speech is delayed. He is slowed.   Flat/odd affect but answers questions appropriately    Nursing note and vitals reviewed.  Patient examined, unchanged from 12/9    Fluids    Intake/Output Summary (Last 24 hours) at 12/11/18 1451  Last data filed at 12/11/18 0600   Gross per 24 hour    Intake             1300 ml   Output              150 ml   Net             1150 ml       Laboratory      Recent Labs      12/10/18   0023   SODIUM  136   POTASSIUM  4.0   CHLORIDE  102   CO2  27   GLUCOSE  99   BUN  32*   CREATININE  0.75   CALCIUM  9.4                   Imaging  CT-HEAD W/O   Final Result      No acute intracranial abnormality is identified.      Paranasal sinus disease.      CT-HEAD W/O   Final Result      No acute intracranial abnormality is identified.      Paranasal sinus disease.      EC-ECHOCARDIOGRAM COMPLETE W/ CONT   Final Result      EC-ECHOCARDIOGRAM COMPLETE W/O CONT         DX-CHEST-PORTABLE (1 VIEW)   Final Result      1.  Hypoinflation without other evidence for acute cardiopulmonary disease.   2.  Stable cardiomegaly.           Assessment/Plan  Orthostatic hypotension- (present on admission)   Assessment & Plan    Suspect autonomic dysfunction secondary to his diabetes.  No significant electrolyte abnormalities to suggest other etiologies.  - neurology evaluated, greatly appreciate (signed off)  - continue with midodrine and mestinon, with holding parameters for elevated BPs  - continue florinef  - possibly start on droxidopa outpatient with PCP, per neuro recs  - amiodarone discontinued per neuro recs, cards aware  - lifestyle modification such as increased water intake, abdominal or lower extremity compression (minimal improvement), head of bed to at least 30 degrees    -We will add salt tabs  -Will look into third line medications and help with some improvement of hypertension enough to discharge home safely with PCP follow-up     Psychogenic nonepileptic seizure- (present on admission)   Assessment & Plan    Discussed with neurology again, does not appear to be orthostatic tremors as he's not orthostatic during the episodes today. No evidence that they are epileptic in nature.  EEG from 12/5 negative for epileptiform discharges during his episodes. NO ATIVAN  - neurology  evaluated (signed off)  - s/p EEG on 11/26 and 12/5 negative  - psych following, greatly appreciate  - Started on Prozac and given community resources for PTSD  - monitor clinically     Acute on chronic diastolic heart failure (HCC)- (present on admission)   Assessment & Plan    ECHO with EF 60%. Given diastolic dysfunction and A fib, likely an element of lost atrial kick.  Still in sinus rhythm.  - cardiology evaluated, signed off  - s/p 11/26 DCCV for atrial fibrillation  - still orthostatic despite cardioversion and return to sinus rhythm     Syncope- (present on admission)   Assessment & Plan    Remains orthostatic, probable autonomic dysfunction  - management as above  - Discharge on hold pending resolution or at least acceptable levels of hypotension     Paroxysmal A-fib (HCC)- (present on admission)   Assessment & Plan    Rate controlled, s/p electrical cardioversion. Now sinus rhythm  - continue xarelto  - outpatient cardiology follow-up     Essential hypertension- (present on admission)   Assessment & Plan    BP has been relatively stable but continues to be orthostatic with standing.  Will accept a higher baseline blood pressure in order to avoid the low standing blood pressures  - Not currenty on antihypertensives, highest 160's  - was previously taking metop and lisinopril     Mixed hyperlipidemia- (present on admission)   Assessment & Plan    - Low-fat low-cholesterol diet.    - Continue with statin.     GERD (gastroesophageal reflux disease)- (present on admission)   Assessment & Plan    Stable   -controlled on PPI     Type 2 diabetes mellitus, without long-term current use of insulin, with peripheral neuropathy (HCC)- (present on admission)   Assessment & Plan    A1C 6.3%, without hyperglycemia. Holding home metformin.  - Sugars are at goal and has not required correctional insulin          VTE prophylaxis: xarelto

## 2018-12-11 NOTE — CARE PLAN
Problem: Communication  Goal: The ability to communicate needs accurately and effectively will improve  Outcome: PROGRESSING AS EXPECTED  Discussed POC and medications with patient. Pt verbalized understanding.      Problem: Safety  Goal: Will remain free from falls  Outcome: PROGRESSING AS EXPECTED  Bed locked and in lowest position. Bed alarm on. Pt is x1 assist. Positive orthostatics. Treaded socks provided. Call light and belongings within reach.  Patient educated to call for assistance. Pt verbalized understanding. Hourly rounding in place.

## 2018-12-11 NOTE — PROGRESS NOTES
Bedside report received. Patient A&O x4. RA. Pt is medical.  No complaints of pain at this time. POC discussed with patient. Patient verbalized understanding. Call light and belongings within reach. Bed locked and in lowest position, alarm and fall precautions in place.

## 2018-12-12 PROCEDURE — A9270 NON-COVERED ITEM OR SERVICE: HCPCS | Performed by: PSYCHIATRY & NEUROLOGY

## 2018-12-12 PROCEDURE — 700102 HCHG RX REV CODE 250 W/ 637 OVERRIDE(OP): Performed by: PSYCHIATRY & NEUROLOGY

## 2018-12-12 PROCEDURE — A9270 NON-COVERED ITEM OR SERVICE: HCPCS | Performed by: HOSPITALIST

## 2018-12-12 PROCEDURE — 770006 HCHG ROOM/CARE - MED/SURG/GYN SEMI*

## 2018-12-12 PROCEDURE — 700102 HCHG RX REV CODE 250 W/ 637 OVERRIDE(OP): Performed by: HOSPITALIST

## 2018-12-12 PROCEDURE — 97116 GAIT TRAINING THERAPY: CPT

## 2018-12-12 PROCEDURE — 97530 THERAPEUTIC ACTIVITIES: CPT

## 2018-12-12 PROCEDURE — A9270 NON-COVERED ITEM OR SERVICE: HCPCS | Performed by: NURSE PRACTITIONER

## 2018-12-12 PROCEDURE — 700102 HCHG RX REV CODE 250 W/ 637 OVERRIDE(OP): Performed by: NURSE PRACTITIONER

## 2018-12-12 PROCEDURE — 99232 SBSQ HOSP IP/OBS MODERATE 35: CPT | Performed by: INTERNAL MEDICINE

## 2018-12-12 RX ORDER — CLONAZEPAM 0.5 MG/1
0.25 TABLET ORAL DAILY
Status: DISCONTINUED | OUTPATIENT
Start: 2018-12-12 | End: 2018-12-18 | Stop reason: HOSPADM

## 2018-12-12 RX ORDER — FLUDROCORTISONE ACETATE 0.1 MG/1
0.3 TABLET ORAL EVERY MORNING
Status: DISCONTINUED | OUTPATIENT
Start: 2018-12-13 | End: 2018-12-18 | Stop reason: HOSPADM

## 2018-12-12 RX ADMIN — PRAVASTATIN SODIUM 20 MG: 20 TABLET ORAL at 17:32

## 2018-12-12 RX ADMIN — STANDARDIZED SENNA CONCENTRATE AND DOCUSATE SODIUM 2 TABLET: 8.6; 5 TABLET, FILM COATED ORAL at 05:37

## 2018-12-12 RX ADMIN — NYSTATIN: 100000 POWDER TOPICAL at 12:38

## 2018-12-12 RX ADMIN — NYSTATIN: 100000 POWDER TOPICAL at 05:39

## 2018-12-12 RX ADMIN — MIDODRINE HYDROCHLORIDE 10 MG: 5 TABLET ORAL at 11:19

## 2018-12-12 RX ADMIN — SODIUM CHLORIDE TAB 1 GM 1 G: 1 TAB at 08:53

## 2018-12-12 RX ADMIN — PYRIDOSTIGMINE BROMIDE 60 MG: 60 TABLET ORAL at 05:44

## 2018-12-12 RX ADMIN — PYRIDOSTIGMINE BROMIDE 60 MG: 60 TABLET ORAL at 11:19

## 2018-12-12 RX ADMIN — TRIAMCINOLONE ACETONIDE: 5 CREAM TOPICAL at 17:32

## 2018-12-12 RX ADMIN — NYSTATIN: 100000 POWDER TOPICAL at 17:32

## 2018-12-12 RX ADMIN — MIDODRINE HYDROCHLORIDE 10 MG: 5 TABLET ORAL at 05:37

## 2018-12-12 RX ADMIN — MIDODRINE HYDROCHLORIDE 10 MG: 5 TABLET ORAL at 16:13

## 2018-12-12 RX ADMIN — FLUOXETINE HYDROCHLORIDE 20 MG: 20 CAPSULE ORAL at 05:37

## 2018-12-12 RX ADMIN — CLONAZEPAM 0.25 MG: 0.5 TABLET ORAL at 13:58

## 2018-12-12 RX ADMIN — PYRIDOSTIGMINE BROMIDE 60 MG: 60 TABLET ORAL at 16:13

## 2018-12-12 RX ADMIN — RIVAROXABAN 20 MG: 20 TABLET, FILM COATED ORAL at 17:32

## 2018-12-12 RX ADMIN — TRIAMCINOLONE ACETONIDE: 5 CREAM TOPICAL at 05:39

## 2018-12-12 RX ADMIN — FLUDROCORTISONE ACETATE 0.2 MG: 0.1 TABLET ORAL at 05:37

## 2018-12-12 ASSESSMENT — ENCOUNTER SYMPTOMS
ABDOMINAL PAIN: 0
VOMITING: 0
NAUSEA: 0
DEPRESSION: 1
SHORTNESS OF BREATH: 0
FEVER: 0
DIZZINESS: 1
FALLS: 1
NERVOUS/ANXIOUS: 0
WEAKNESS: 1
HALLUCINATIONS: 0

## 2018-12-12 ASSESSMENT — PATIENT HEALTH QUESTIONNAIRE - PHQ9
4. FEELING TIRED OR HAVING LITTLE ENERGY: SEVERAL DAYS
3. TROUBLE FALLING OR STAYING ASLEEP OR SLEEPING TOO MUCH: SEVERAL DAYS
5. POOR APPETITE OR OVEREATING: SEVERAL DAYS
SUM OF ALL RESPONSES TO PHQ9 QUESTIONS 1 AND 2: 0
8. MOVING OR SPEAKING SO SLOWLY THAT OTHER PEOPLE COULD HAVE NOTICED. OR THE OPPOSITE, BEING SO FIGETY OR RESTLESS THAT YOU HAVE BEEN MOVING AROUND A LOT MORE THAN USUAL: SEVERAL DAYS
2. FEELING DOWN, DEPRESSED, IRRITABLE, OR HOPELESS: NOT AT ALL
6. FEELING BAD ABOUT YOURSELF - OR THAT YOU ARE A FAILURE OR HAVE LET YOURSELF OR YOUR FAMILY DOWN: NOT AL ALL
SUM OF ALL RESPONSES TO PHQ QUESTIONS 1-9: 6
7. TROUBLE CONCENTRATING ON THINGS, SUCH AS READING THE NEWSPAPER OR WATCHING TELEVISION: SEVERAL DAYS
9. THOUGHTS THAT YOU WOULD BE BETTER OFF DEAD, OR OF HURTING YOURSELF: SEVERAL DAYS
1. LITTLE INTEREST OR PLEASURE IN DOING THINGS: NOT AT ALL

## 2018-12-12 ASSESSMENT — COGNITIVE AND FUNCTIONAL STATUS - GENERAL
MOVING TO AND FROM BED TO CHAIR: A LITTLE
STANDING UP FROM CHAIR USING ARMS: A LITTLE
CLIMB 3 TO 5 STEPS WITH RAILING: A LITTLE
WALKING IN HOSPITAL ROOM: A LITTLE
SUGGESTED CMS G CODE MODIFIER MOBILITY: CK
MOBILITY SCORE: 18
TURNING FROM BACK TO SIDE WHILE IN FLAT BAD: A LITTLE
MOVING FROM LYING ON BACK TO SITTING ON SIDE OF FLAT BED: A LITTLE

## 2018-12-12 ASSESSMENT — PAIN SCALES - GENERAL
PAINLEVEL_OUTOF10: 0
PAINLEVEL_OUTOF10: 0

## 2018-12-12 ASSESSMENT — GAIT ASSESSMENTS
GAIT LEVEL OF ASSIST: STAND BY ASSIST
ASSISTIVE DEVICE: FRONT WHEEL WALKER
DEVIATION: SHUFFLED GAIT

## 2018-12-12 NOTE — PROGRESS NOTES
Assumed care of pt at 1930. Report received and bedside rounding completed with day RN. Pt in bed resting comfortably denies any pain at this time and is calm. No SOB, or in any acute distress.   Fall precautions in place,  bed alarm. - Treaded non slip socks. Call light and pt belongings within reach - pt makes needs known - hourly rounding in place. See flowsheets for further assessment.

## 2018-12-12 NOTE — PROGRESS NOTES
Patient arrived to the floor around 1800. Patient is alert and oriented x4. Patient denies pain. Patient has no needs at this time.     Call bell within reach. Bed alarm is on.

## 2018-12-12 NOTE — HEART FAILURE PROGRAM
Dec 17, 2018  3:15 PM PST  Established Patient with German Hinton M.D.  [facility] (--) [address] [place] [address]  [address]  [facility] [id number]  [phone]      Dec 24, 2018 12:15 PM PST  Heart Failure New Patient with Sirena Larson M.D.  [facility] (--) [address] , [facility] [address]  [facility] [address]

## 2018-12-12 NOTE — THERAPY
"Physical Therapy Treatment completed.   Bed Mobility:  Supine to Sit: Supervised  Transfers: Sit to Stand: Stand by Assist  Gait: Level Of Assist: Stand by Assist with FWW and w/c follow for safety due to hypotension     Plan of Care: Will benefit from Physical Therapy 3 times per week  Discharge Recommendations: Equipment:wide/bariatric 4-Wheel Walker. Post-acute therapy Discharge to home with outpatient or home health for additional skilled therapy services.     Patient continues to be limited by orthostatic sxs. Patient able to walk 3 bouts of short distances (10 ft, 15 ft, then 35 ft) today with FWW, SBA and w/c follow.  Patient required seated rest break in between each gait bout due to onset of light headedness.  BP mid gait 134/77, HR 53. Will continue to follow while in acute care. Patient may benefit from use of a 4WW upon d/c, in order to have BUE stability while amb and a seat available at all times in the event of onset of orthostatic sxs. D/c pending progress, patient will likely be okay to d/c to home with girlfriend and  services, pending resolution of orthostatic sxs.     See \"Rehab Therapy-Acute\" Patient Summary Report for complete documentation.       "

## 2018-12-12 NOTE — PSYCHIATRY
"PSYCHIATRIC FOLLOW-UP:  Reason for admission: Acute systolic heart failure (HCC)  Orthostatic hypotension    Legal status: [place]    Chief Complaint: \"Im up and Down\"    HPI: Sebastián Castro is a 53 y.o. year old male with a PMH of CHF, a fib, DM, HTN,  Amputation, who presented to the [facility] complaining of leg swelling. Sebastián has has diagnosed with PTSD earlier and is still dealing with some depression and anxiety. Today he states his depression has remained the same all week. He understands antidepressant take weeks to begin to work as well. He dos complain of significant anxiety which he would like us to treat. He says he is refusing to go to the SNF because he has an apartment and girlfriend now and he'd like to live as normal as he can. Patient denies suicidal ideation, homicidal ideation, delusions, and hallucinations.     Psychiatric Examination:  Vitals: Blood pressure 117/62, pulse 60, temperature 36.4 °C (97.5 °F), temperature source Temporal, resp. rate 16, height 1.829 m (6' 0.01\"), weight 107.5 kg (236 lb 15.9 oz), SpO2 97 %.  Musculoskeletal: normal psychomotor activity, no tics or unusual mannerisms noted  Appearance and Eye Contact: Appropriate dress and grooming. Behavior is calm, cooperative,  appropriate eye contact  Attention/Alertness: Alert  Thought Process: Logical and Goal Directed    Thought Content: [place]  Speech: Clear with normal rate and rhythm  Mood: \"Depressed\"            Affect: Congruent with mood         SI/HI: Denies  Memory: Recent and remote memory appear intact    Orientation: alert, oriented to person, place and time  Insight into symptoms: poor  Judgement into symptoms:poor    ASSESSMENT:     DSM5 Diagnostic Considerations:   Adjutsment disorder with depressed mood  PTSD  Functional Neurologic Symptom diosrder     PLAN:  Begin Clonazepam for anxiety. 0.25 mg daily  Legal status: Not applicable   Anticipate F/U within 48 hours.   Records reviewed: Yes  Discussed " patient with other provider: Lena Patel  Will continue to follow    Meng Eller, PMHNP-BC

## 2018-12-12 NOTE — PROGRESS NOTES
· 2 RN skin check complete with Duncan RN  · Devices in place compression stockings.  · Skin assessed under devices Yes.  · Confirmed pressure ulcers found on N/A.  · New potential pressure ulcers noted on N/A.   · The following interventions in place. q2 hr turning. floating pt on pillows.

## 2018-12-12 NOTE — PROGRESS NOTES
Orthostatic vs taken with CNA and RN at bedside. Pt upon pt standing up pt complaining of dizziness. Pt did not c/o of dizziness while laying down or sitting.. VS charted in epic. Pitcher of water provided.

## 2018-12-12 NOTE — CARE PLAN
Problem: Safety  Goal: Will remain free from injury  Pt high fall risk due to recent fall. Pt unable to mobilize hypotension with standing and dizzy. Pt calling for any assistance.     Problem: Knowledge Deficit  Goal: Knowledge of disease process/condition, treatment plan, diagnostic tests, and medications will improve  POC discussed with pt and high fall risk edu provided. Questions answered at bedside.

## 2018-12-12 NOTE — PROGRESS NOTES
Hospital Medicine Daily Progress Note    Date of Service  12/12/2018    Chief Complaint  53 y.o. male admitted 11/21/2018 with shortness of breath and leg swelling in addition to 40 pounds of weight over 2 weeks    Hospital Course  Presented with complaints of leg swelling, and 40 pound weight gain with bilateral leg edema, increased swelling consistent with exacerbation of congestive heart failure patient was diuresed but then had decreased sodium level and severe orthostasis and postural hypotension has continued to be an issue.    ECHO performed 11/22:  Prior echo done 07/09/18, EF now normal.  Quality improved with contrast.  Left ventricular ejection fraction is visually estimated to be 60%.  Mild concentric left ventricular hypertrophy.  Mild mitral annular calcifcation, mild MR.  Mild tricuspid regurgitation.  Estimated right ventricular systolic pressure  is 30 mmHg.    Interval Problem Update  Orthostatic hypotension-still quite severe when standing up, SBP drops to the 60's and patient is correspondingly quite symptomatic. Abdominal binder helps somewhat, does not want the salt tablets and feels that they are increasing his swelling.     Consultants/Specialty  Cards  Neurology    Code Status  Full    Disposition  Likely home with home health, patient declines skilled rehab, needs safe discharge home however and continued postural hypotension will be an issue    Review of Systems  Review of Systems   Constitutional: Negative for fever. Malaise/fatigue: Resolved.   Respiratory: Negative for shortness of breath.    Cardiovascular: Positive for leg swelling (worsening today).   Gastrointestinal: Negative for abdominal pain, nausea and vomiting.   Genitourinary: Negative for urgency.   Musculoskeletal: Positive for falls. Negative for joint pain.   Neurological: Positive for dizziness (Orthostatic, upon standing) and weakness (minimal change).   Psychiatric/Behavioral: Positive for depression. Negative for  hallucinations. The patient is not nervous/anxious.    All other systems reviewed and are negative.       Physical Exam  Temp:  [36.4 °C (97.5 °F)-36.8 °C (98.2 °F)] 36.4 °C (97.5 °F)  Pulse:  [45-92] 60  Resp:  [16] 16  BP: ()/(37-81) 117/62    Physical Exam   Constitutional: He is oriented to person, place, and time. He appears well-developed.   HENT:   Head: Normocephalic and atraumatic.   Mouth/Throat: Oropharynx is clear and moist.   Eyes: Conjunctivae and EOM are normal. No scleral icterus.   Neck: Normal range of motion.   Cardiovascular: Normal rate, regular rhythm and intact distal pulses.    No murmur heard.  Pulmonary/Chest: Effort normal. No stridor. No respiratory distress.   Abdominal: Soft. There is no tenderness.   Musculoskeletal: Normal range of motion. He exhibits edema (compression stockings in place). He exhibits no tenderness or deformity.   Warm peripherally     Neurological: He is alert and oriented to person, place, and time. He has normal strength. No sensory deficit.   Skin: Skin is warm and dry. No rash noted. No erythema.   Psychiatric: He has a normal mood and affect. His speech is delayed. He is slowed.   Flat/odd affect but answers questions appropriately, remains unchanged today   Nursing note and vitals reviewed.      Fluids    Intake/Output Summary (Last 24 hours) at 12/12/18 1503  Last data filed at 12/12/18 1419   Gross per 24 hour   Intake             1190 ml   Output             1600 ml   Net             -410 ml       Laboratory      Recent Labs      12/10/18   0023   SODIUM  136   POTASSIUM  4.0   CHLORIDE  102   CO2  27   GLUCOSE  99   BUN  32*   CREATININE  0.75   CALCIUM  9.4                   Imaging  CT-HEAD W/O   Final Result      No acute intracranial abnormality is identified.      Paranasal sinus disease.      CT-HEAD W/O   Final Result      No acute intracranial abnormality is identified.      Paranasal sinus disease.      EC-ECHOCARDIOGRAM COMPLETE W/ CONT    Final Result      EC-ECHOCARDIOGRAM COMPLETE W/O CONT         DX-CHEST-PORTABLE (1 VIEW)   Final Result      1.  Hypoinflation without other evidence for acute cardiopulmonary disease.   2.  Stable cardiomegaly.           Assessment/Plan  Orthostatic hypotension- (present on admission)   Assessment & Plan    Suspect autonomic dysfunction secondary to his diabetes.  No significant electrolyte abnormalities to suggest other etiologies.  - neurology evaluated, greatly appreciate (signed off)  - continue with midodrine and mestinon, with holding parameters for elevated BPs  - continue florinef, but increase to max dose of 0.3 mg daily (monitor K+ closely)  - possibly start on droxidopa outpatient with PCP, per neuro recs  - amiodarone discontinued per neuro recs, cards aware  - lifestyle modification such as increased water intake, abdominal or lower extremity compression (minimal improvement), head of bed to at least 30 degrees  -continue compression stockings and abdominal binder, get up slowly, continue to monitor, still quite severe last night    -stop salt tablets as worsening diastolic HF issues     Psychogenic nonepileptic seizure- (present on admission)   Assessment & Plan    Discussed with neurology again, does not appear to be orthostatic tremors as he's not orthostatic during the episodes today. No evidence that they are epileptic in nature.  EEG from 12/5 negative for epileptiform discharges during his episodes. NO ATIVAN  - neurology evaluated (signed off)  - s/p EEG on 11/26 and 12/5 negative  - psych following, greatly appreciate  - Started on Prozac and given community resources for PTSD  - monitor clinically     Acute on chronic diastolic heart failure (HCC)- (present on admission)   Assessment & Plan    ECHO with EF 60%. Given diastolic dysfunction and A fib, likely an element of lost atrial kick.  Still in sinus rhythm.  - cardiology evaluated, signed off  - s/p 11/26 DCCV for atrial fibrillation  -  still orthostatic despite cardioversion and return to sinus rhythm     Syncope- (present on admission)   Assessment & Plan    Remains orthostatic, probable autonomic dysfunction  - management as above  - Discharge on hold pending resolution or at least acceptable levels of hypotension     Paroxysmal A-fib (HCC)- (present on admission)   Assessment & Plan    Rate controlled, s/p electrical cardioversion. Now sinus rhythm  - continue xarelto  - outpatient cardiology follow-up     Essential hypertension- (present on admission)   Assessment & Plan    BP has been relatively stable but continues to be orthostatic with standing.  Will accept a higher baseline blood pressure in order to avoid the low standing blood pressures  - Not currenty on antihypertensives, highest 160's  - was previously taking metop and lisinopril     Mixed hyperlipidemia- (present on admission)   Assessment & Plan    - Low-fat low-cholesterol diet.    - Continue with statin.     GERD (gastroesophageal reflux disease)- (present on admission)   Assessment & Plan    Stable   -controlled on PPI     Type 2 diabetes mellitus, without long-term current use of insulin, with peripheral neuropathy (HCC)- (present on admission)   Assessment & Plan    A1C 6.3%, without hyperglycemia. Holding home metformin.  - Sugars are at goal and has not required correctional insulin          VTE prophylaxis: xarelto

## 2018-12-13 LAB
ANION GAP SERPL CALC-SCNC: 5 MMOL/L (ref 0–11.9)
BUN SERPL-MCNC: 27 MG/DL (ref 8–22)
CALCIUM SERPL-MCNC: 9.7 MG/DL (ref 8.5–10.5)
CHLORIDE SERPL-SCNC: 103 MMOL/L (ref 96–112)
CO2 SERPL-SCNC: 25 MMOL/L (ref 20–33)
CREAT SERPL-MCNC: 0.83 MG/DL (ref 0.5–1.4)
GLUCOSE SERPL-MCNC: 91 MG/DL (ref 65–99)
MAGNESIUM SERPL-MCNC: 2.1 MG/DL (ref 1.5–2.5)
POTASSIUM SERPL-SCNC: 4.3 MMOL/L (ref 3.6–5.5)
SODIUM SERPL-SCNC: 133 MMOL/L (ref 135–145)

## 2018-12-13 PROCEDURE — A9270 NON-COVERED ITEM OR SERVICE: HCPCS | Performed by: NURSE PRACTITIONER

## 2018-12-13 PROCEDURE — 80048 BASIC METABOLIC PNL TOTAL CA: CPT

## 2018-12-13 PROCEDURE — 700102 HCHG RX REV CODE 250 W/ 637 OVERRIDE(OP): Performed by: HOSPITALIST

## 2018-12-13 PROCEDURE — 700102 HCHG RX REV CODE 250 W/ 637 OVERRIDE(OP): Performed by: INTERNAL MEDICINE

## 2018-12-13 PROCEDURE — A9270 NON-COVERED ITEM OR SERVICE: HCPCS | Performed by: INTERNAL MEDICINE

## 2018-12-13 PROCEDURE — A9270 NON-COVERED ITEM OR SERVICE: HCPCS | Performed by: PSYCHIATRY & NEUROLOGY

## 2018-12-13 PROCEDURE — 700102 HCHG RX REV CODE 250 W/ 637 OVERRIDE(OP): Performed by: PSYCHIATRY & NEUROLOGY

## 2018-12-13 PROCEDURE — 36415 COLL VENOUS BLD VENIPUNCTURE: CPT

## 2018-12-13 PROCEDURE — A6250 SKIN SEAL PROTECT MOISTURIZR: HCPCS | Performed by: INTERNAL MEDICINE

## 2018-12-13 PROCEDURE — 83735 ASSAY OF MAGNESIUM: CPT

## 2018-12-13 PROCEDURE — 770006 HCHG ROOM/CARE - MED/SURG/GYN SEMI*

## 2018-12-13 PROCEDURE — 99232 SBSQ HOSP IP/OBS MODERATE 35: CPT | Performed by: HOSPITALIST

## 2018-12-13 PROCEDURE — 700102 HCHG RX REV CODE 250 W/ 637 OVERRIDE(OP): Performed by: NURSE PRACTITIONER

## 2018-12-13 PROCEDURE — A9270 NON-COVERED ITEM OR SERVICE: HCPCS | Performed by: HOSPITALIST

## 2018-12-13 RX ORDER — PYRIDOSTIGMINE BROMIDE 60 MG/1
90 TABLET ORAL 3 TIMES DAILY
Status: DISCONTINUED | OUTPATIENT
Start: 2018-12-13 | End: 2018-12-18 | Stop reason: HOSPADM

## 2018-12-13 RX ADMIN — MIDODRINE HYDROCHLORIDE 10 MG: 5 TABLET ORAL at 04:43

## 2018-12-13 RX ADMIN — PYRIDOSTIGMINE BROMIDE 90 MG: 60 TABLET ORAL at 17:04

## 2018-12-13 RX ADMIN — NYSTATIN: 100000 POWDER TOPICAL at 04:44

## 2018-12-13 RX ADMIN — MIDODRINE HYDROCHLORIDE 10 MG: 5 TABLET ORAL at 17:03

## 2018-12-13 RX ADMIN — FLUDROCORTISONE ACETATE 0.3 MG: 0.1 TABLET ORAL at 04:43

## 2018-12-13 RX ADMIN — PYRIDOSTIGMINE BROMIDE 60 MG: 60 TABLET ORAL at 11:53

## 2018-12-13 RX ADMIN — NYSTATIN: 100000 POWDER TOPICAL at 17:04

## 2018-12-13 RX ADMIN — MIDODRINE HYDROCHLORIDE 10 MG: 5 TABLET ORAL at 11:53

## 2018-12-13 RX ADMIN — TRIAMCINOLONE ACETONIDE: 5 CREAM TOPICAL at 04:44

## 2018-12-13 RX ADMIN — NYSTATIN: 100000 POWDER TOPICAL at 11:58

## 2018-12-13 RX ADMIN — PYRIDOSTIGMINE BROMIDE 60 MG: 60 TABLET ORAL at 04:45

## 2018-12-13 RX ADMIN — PRAVASTATIN SODIUM 20 MG: 20 TABLET ORAL at 17:04

## 2018-12-13 RX ADMIN — TRIAMCINOLONE ACETONIDE: 5 CREAM TOPICAL at 17:04

## 2018-12-13 RX ADMIN — FLUOXETINE HYDROCHLORIDE 20 MG: 20 CAPSULE ORAL at 04:43

## 2018-12-13 RX ADMIN — RIVAROXABAN 20 MG: 20 TABLET, FILM COATED ORAL at 17:04

## 2018-12-13 RX ADMIN — CLONAZEPAM 0.25 MG: 0.5 TABLET ORAL at 04:44

## 2018-12-13 ASSESSMENT — ENCOUNTER SYMPTOMS
VOMITING: 0
SHORTNESS OF BREATH: 0
HALLUCINATIONS: 0
FALLS: 1
DIZZINESS: 1
ABDOMINAL PAIN: 0
WEAKNESS: 1
NAUSEA: 0
FEVER: 0
DEPRESSION: 1
NERVOUS/ANXIOUS: 0

## 2018-12-13 ASSESSMENT — PAIN SCALES - GENERAL
PAINLEVEL_OUTOF10: 0
PAINLEVEL_OUTOF10: 0

## 2018-12-13 NOTE — PROGRESS NOTES
Bedside report received. Assumed care of pt.  Pt able to make needs known.  Pt shows no s/s of distress.  Resting comfortably in bed.  Fall precautions in place, call light and belongings within reach. Plan of care discussed, all questions and concerns answered. No c/o pain, RADHA hose readjusted, skin assessed underneath hose. Daily wt taken on stand up scale.  Education provided with daily weights.  HF packet found, wrote daily wt in HF calendar.  No diuretics given.  Orthostatics still a problem, see flowsheet  Standing BP was 79/52.  Hourly rounding in place.

## 2018-12-13 NOTE — CARE PLAN
Problem: Safety  Goal: Will remain free from falls    Intervention: Assess risk factors for falls  Orthostatic BP still problematic.  Assessing orthostatics with routine vitals since no order to do so.  Fall precautions in place.  Maintaining SBA to BR for safety.

## 2018-12-13 NOTE — CARE PLAN
Problem: Safety  Goal: Will remain free from injury  Outcome: PROGRESSING AS EXPECTED  Bed on lowest position, call light within reach, patient educated about use of call light and safety precautions. Educated patient to call prior to getting up and to let staff know if patient feels dizzy/lightheaded.     Problem: Knowledge Deficit  Goal: Knowledge of disease process/condition, treatment plan, diagnostic tests, and medications will improve  Outcome: PROGRESSING AS EXPECTED  POC explained. Stated to patient will notify MD of same drop in bp when standing up.

## 2018-12-13 NOTE — PROGRESS NOTES
[facility] Medicine Daily Progress Note    Date of Service  12/13/2018    Chief Complaint  53 y.o. male admitted 11/21/2018 with shortness of breath and leg swelling in addition to 40 pounds of weight over 2 weeks    Hospital Course  Presented with complaints of leg swelling, and 40 pound weight gain with bilateral leg edema, increased swelling consistent with exacerbation of congestive heart failure patient was diuresed but then had decreased sodium level and severe orthostasis and postural hypotension has continued to be an issue.    ECHO performed 11/22:  Prior echo done 07/09/18, EF now normal.  Quality improved with contrast.  Left ventricular ejection fraction is visually estimated to be 60%.  Mild concentric left ventricular hypertrophy.  Mild mitral annular calcifcation, mild MR.  Mild tricuspid regurgitation.  Estimated right ventricular systolic pressure  is 30 mmHg.    Interval Problem Update  Orthostatic hypotension-still quite severe when standing up and he becomes lightheaded and about to pass out  Not wearing his abdominal binder  Will increase pyridostigmine today    Consultants/Specialty  Cards  Neurology    Code Status  Full    Disposition  Likely home with home health, patient declines skilled rehab, needs safe discharge home however and continued postural hypotension will be an issue    Review of Systems  Review of Systems   Constitutional: Negative for fever.   Respiratory: Negative for shortness of breath.    Cardiovascular: Positive for leg swelling.   Gastrointestinal: Negative for abdominal pain, nausea and vomiting.   Genitourinary: Negative for urgency.   Musculoskeletal: Positive for falls. Negative for joint pain.   Neurological: Positive for dizziness (Orthostatic, upon standing) and weakness (minimal change).   Psychiatric/Behavioral: Positive for depression. Negative for hallucinations. The patient is not nervous/anxious.    All other systems reviewed and are negative.       Physical  Exam  Temp:  [36.2 °C (97.2 °F)-36.8 °C (98.2 °F)] 36.2 °C (97.2 °F)  Pulse:  [51-97] 97  Resp:  [16-18] 18  BP: ()/(41-86) 79/52    Physical Exam   Constitutional: He is oriented to person, place, and time. He appears well-developed.   HENT:   Head: Normocephalic and atraumatic.   Mouth/Throat: Oropharynx is clear and moist.   Eyes: Conjunctivae and EOM are normal. No scleral icterus.   Neck: Normal range of motion.   Cardiovascular: Normal rate, regular rhythm and intact distal pulses.    No murmur heard.  Pulmonary/Chest: Effort normal. No stridor. No respiratory distress.   Abdominal: Soft. There is no tenderness.   Musculoskeletal: Normal range of motion. He exhibits edema (compression stockings in place). He exhibits no tenderness or deformity.   Warm peripherally     Neurological: He is alert and oriented to person, place, and time. He has normal strength. No sensory deficit.   Skin: Skin is warm and dry. No rash noted. No erythema.   Psychiatric: He has a normal mood and affect. His speech is delayed. He is slowed.   Flat/odd affect but answers questions appropriately, remains unchanged today   Nursing note and vitals reviewed.      Fluids    Intake/Output Summary (Last 24 hours) at 12/13/18 1535  Last data filed at 12/13/18 1400   Gross per 24 hour   Intake              550 ml   Output              300 ml   Net              250 ml       Laboratory      Recent Labs      12/13/18   0241   SODIUM  133*   POTASSIUM  4.3   CHLORIDE  103   CO2  25   GLUCOSE  91   BUN  27*   CREATININE  0.83   CALCIUM  9.7                   Imaging  CT-HEAD W/O   Final Result      No acute intracranial abnormality is identified.      Paranasal sinus disease.      CT-HEAD W/O   Final Result      No acute intracranial abnormality is identified.      Paranasal sinus disease.      EC-ECHOCARDIOGRAM COMPLETE W/ CONT   Final Result      EC-ECHOCARDIOGRAM COMPLETE W/O CONT         DX-CHEST-PORTABLE (1 VIEW)   Final Result      1.   Hypoinflation without other evidence for acute cardiopulmonary disease.   2.  Stable cardiomegaly.           Assessment/Plan  Orthostatic hypotension- (present on admission)   Assessment & Plan    Suspect autonomic dysfunction secondary to his diabetes.  No significant electrolyte abnormalities to suggest other etiologies.  - neurology evaluated, greatly appreciate (signed off)  - continue with midodrine and mestinon, with holding parameters for elevated BPs  - continue florinef, but increase to max dose of 0.3 mg daily (monitor K+ closely)  - possibly start on droxidopa outpatient with PCP, per neuro recs  - amiodarone discontinued per neuro recs, cards aware  - lifestyle modification such as increased water intake, abdominal or lower extremity compression (minimal improvement), head of bed to at least 30 degrees  -continue compression stockings and abdominal binder, get up slowly, continue to monitor, still quite severe last night  -stop salt tablets as worsening diastolic HF issues  Pyridostigmine dose increased from 60 mg to 90 mg on 12/13     Psychogenic nonepileptic seizure- (present on admission)   Assessment & Plan    Discussed with neurology again, does not appear to be orthostatic tremors as he's not orthostatic during the episodes today. No evidence that they are epileptic in nature.  EEG from 12/5 negative for epileptiform discharges during his episodes. NO ATIVAN  - neurology evaluated (signed off)  - s/p EEG on 11/26 and 12/5 negative  - psych following, greatly appreciate  - Started on Prozac and given community resources for PTSD  - monitor clinically     Acute on chronic diastolic heart failure (HCC)- (present on admission)   Assessment & Plan    ECHO with EF 60%. Given diastolic dysfunction and A fib, likely an element of lost atrial kick.  Still in sinus rhythm.  - cardiology evaluated, signed off  - s/p 11/26 DCCV for atrial fibrillation  - still orthostatic despite cardioversion and return to  sinus rhythm     Syncope- (present on admission)   Assessment & Plan    Remains orthostatic, probable autonomic dysfunction  - management as above  - Discharge on hold pending resolution or at least acceptable levels of hypotension     Paroxysmal A-fib (HCC)- (present on admission)   Assessment & Plan    Rate controlled, s/p electrical cardioversion. Now sinus rhythm  - continue xarelto  - outpatient cardiology follow-up     Essential hypertension- (present on admission)   Assessment & Plan    BP has been relatively stable but continues to be orthostatic with standing.  Will accept a higher baseline blood pressure in order to avoid the low standing blood pressures  - Not currenty on antihypertensives, highest 160's  - was previously taking metop and lisinopril     Mixed hyperlipidemia- (present on admission)   Assessment & Plan    - Low-fat low-cholesterol diet.    - Continue with statin.     GERD (gastroesophageal reflux disease)- (present on admission)   Assessment & Plan    Stable   -controlled on PPI     Type 2 diabetes mellitus, without long-term current use of insulin, with peripheral neuropathy (HCC)- (present on admission)   Assessment & Plan    A1C 6.3%, without hyperglycemia. Holding home metformin.  - Sugars are at goal and has not required correctional insulin          VTE prophylaxis: xarelto

## 2018-12-14 PROCEDURE — A9270 NON-COVERED ITEM OR SERVICE: HCPCS | Performed by: INTERNAL MEDICINE

## 2018-12-14 PROCEDURE — A9270 NON-COVERED ITEM OR SERVICE: HCPCS | Performed by: NURSE PRACTITIONER

## 2018-12-14 PROCEDURE — 700102 HCHG RX REV CODE 250 W/ 637 OVERRIDE(OP): Performed by: NURSE PRACTITIONER

## 2018-12-14 PROCEDURE — 700102 HCHG RX REV CODE 250 W/ 637 OVERRIDE(OP): Performed by: INTERNAL MEDICINE

## 2018-12-14 PROCEDURE — 700102 HCHG RX REV CODE 250 W/ 637 OVERRIDE(OP): Performed by: HOSPITALIST

## 2018-12-14 PROCEDURE — 770006 HCHG ROOM/CARE - MED/SURG/GYN SEMI*

## 2018-12-14 PROCEDURE — A9270 NON-COVERED ITEM OR SERVICE: HCPCS | Performed by: HOSPITALIST

## 2018-12-14 PROCEDURE — 99232 SBSQ HOSP IP/OBS MODERATE 35: CPT | Performed by: HOSPITALIST

## 2018-12-14 PROCEDURE — 700102 HCHG RX REV CODE 250 W/ 637 OVERRIDE(OP): Performed by: PSYCHIATRY & NEUROLOGY

## 2018-12-14 PROCEDURE — A9270 NON-COVERED ITEM OR SERVICE: HCPCS | Performed by: PSYCHIATRY & NEUROLOGY

## 2018-12-14 RX ADMIN — MIDODRINE HYDROCHLORIDE 10 MG: 5 TABLET ORAL at 11:50

## 2018-12-14 RX ADMIN — MIDODRINE HYDROCHLORIDE 10 MG: 5 TABLET ORAL at 05:47

## 2018-12-14 RX ADMIN — NYSTATIN: 100000 POWDER TOPICAL at 05:47

## 2018-12-14 RX ADMIN — RIVAROXABAN 20 MG: 20 TABLET, FILM COATED ORAL at 17:33

## 2018-12-14 RX ADMIN — PYRIDOSTIGMINE BROMIDE 90 MG: 60 TABLET ORAL at 06:26

## 2018-12-14 RX ADMIN — FLUOXETINE HYDROCHLORIDE 20 MG: 20 CAPSULE ORAL at 05:47

## 2018-12-14 RX ADMIN — FLUDROCORTISONE ACETATE 0.3 MG: 0.1 TABLET ORAL at 05:47

## 2018-12-14 RX ADMIN — CLONAZEPAM 0.25 MG: 0.5 TABLET ORAL at 05:47

## 2018-12-14 RX ADMIN — PRAVASTATIN SODIUM 20 MG: 20 TABLET ORAL at 17:33

## 2018-12-14 RX ADMIN — MIDODRINE HYDROCHLORIDE 10 MG: 5 TABLET ORAL at 18:14

## 2018-12-14 RX ADMIN — TRIAMCINOLONE ACETONIDE: 5 CREAM TOPICAL at 05:47

## 2018-12-14 RX ADMIN — NYSTATIN: 100000 POWDER TOPICAL at 17:33

## 2018-12-14 RX ADMIN — PYRIDOSTIGMINE BROMIDE 90 MG: 60 TABLET ORAL at 18:15

## 2018-12-14 RX ADMIN — PYRIDOSTIGMINE BROMIDE 90 MG: 60 TABLET ORAL at 11:50

## 2018-12-14 RX ADMIN — NYSTATIN: 100000 POWDER TOPICAL at 12:00

## 2018-12-14 ASSESSMENT — ENCOUNTER SYMPTOMS
ABDOMINAL PAIN: 0
SHORTNESS OF BREATH: 0
NAUSEA: 0
HALLUCINATIONS: 0
NERVOUS/ANXIOUS: 0
FALLS: 1
DEPRESSION: 1
WEAKNESS: 1
DIZZINESS: 1
VOMITING: 0
FEVER: 0

## 2018-12-14 ASSESSMENT — PAIN SCALES - GENERAL
PAINLEVEL_OUTOF10: 0
PAINLEVEL_OUTOF10: 0

## 2018-12-14 NOTE — PROGRESS NOTES
"Assumed care at 1900. Received report from RN. Patient is AOx4. Patient still hypotensive. Taking orthostatic bps. Patient denies pain. Patient has wound to left foot. Dressing in place. Patient not mobilized due to dizziness. Assessment complete. Labs reviewed. Patient and RN discussed plan of care. Patient questions answered. Patient needs are met at this time. Bed in lowest and locked position. Call light is within reach. Hourly rounding in place. BP (!) 76/51   Pulse (!) 102   Temp 36.3 °C (97.3 °F) (Temporal)   Resp 19   Ht 1.829 m (6' 0.01\")   Wt 102.8 kg (226 lb 10.1 oz)   SpO2 94%   BMI 30.73 kg/m²       "

## 2018-12-14 NOTE — PROGRESS NOTES
Assumed care of patient, pt alert, awake. BP in bed stable, reports of hypotension, meds given as ordered.    Denies pain. Stable on RA.     No set DC plans at this time.

## 2018-12-14 NOTE — PROGRESS NOTES
[facility] Medicine Daily Progress Note    Date of Service  12/14/2018    Chief Complaint  53 y.o. male admitted 11/21/2018 with shortness of breath and leg swelling in addition to 40 pounds of weight over 2 weeks    Hospital Course  Presented with complaints of leg swelling, and 40 pound weight gain with bilateral leg edema, increased swelling consistent with exacerbation of congestive heart failure patient was diuresed but then had decreased sodium level and severe orthostasis and postural hypotension has continued to be an issue.    ECHO performed 11/22:  Prior echo done 07/09/18, EF now normal.  Quality improved with contrast.  Left ventricular ejection fraction is visually estimated to be 60%.  Mild concentric left ventricular hypertrophy.  Mild mitral annular calcifcation, mild MR.  Mild tricuspid regurgitation.  Estimated right ventricular systolic pressure  is 30 mmHg.    Interval Problem Update  BP still low on standing. Even low when laying down prior to midodrine  Encourage patient to wear abdominal binder  Still orthostatic  Will re consult cardiology and see if possibly transfer to tertiary center would be appropriate    Consultants/Specialty  Cards  Neurology    Code Status  Full    Disposition  TBD    Review of Systems  Review of Systems   Constitutional: Negative for fever.   Respiratory: Negative for shortness of breath.    Cardiovascular: Positive for leg swelling.   Gastrointestinal: Negative for abdominal pain, nausea and vomiting.   Genitourinary: Negative for urgency.   Musculoskeletal: Positive for falls. Negative for joint pain.   Neurological: Positive for dizziness (Orthostatic, upon standing) and weakness (minimal change).   Psychiatric/Behavioral: Positive for depression. Negative for hallucinations. The patient is not nervous/anxious.    All other systems reviewed and are negative.       Physical Exam  Temp:  [36 °C (96.8 °F)-36.7 °C (98.1 °F)] 36.5 °C (97.7 °F)  Pulse:  [] 66  Resp:   [16-19] 16  BP: ()/(42-86) 99/65    Physical Exam   Constitutional: He is oriented to person, place, and time. He appears well-developed.   HENT:   Head: Normocephalic and atraumatic.   Mouth/Throat: Oropharynx is clear and moist.   Eyes: Conjunctivae and EOM are normal. No scleral icterus.   Neck: Normal range of motion.   Cardiovascular: Normal rate, regular rhythm and intact distal pulses.    No murmur heard.  Pulmonary/Chest: Effort normal. No stridor. No respiratory distress.   Abdominal: Soft. There is no tenderness.   Musculoskeletal: Normal range of motion. He exhibits edema (compression stockings in place). He exhibits no tenderness or deformity.   Warm peripherally     Neurological: He is alert and oriented to person, place, and time. He has normal strength. No sensory deficit.   Skin: Skin is warm and dry. No rash noted. No erythema.   Psychiatric: He has a normal mood and affect. His speech is delayed. He is slowed.   Flat/odd affect but answers questions appropriately, remains unchanged today   Nursing note and vitals reviewed.      Fluids    Intake/Output Summary (Last 24 hours) at 12/14/18 1830  Last data filed at 12/14/18 1645   Gross per 24 hour   Intake             1715 ml   Output             1835 ml   Net             -120 ml       Laboratory      Recent Labs      12/13/18   0241   SODIUM  133*   POTASSIUM  4.3   CHLORIDE  103   CO2  25   GLUCOSE  91   BUN  27*   CREATININE  0.83   CALCIUM  9.7                   Imaging  CT-HEAD W/O   Final Result      No acute intracranial abnormality is identified.      Paranasal sinus disease.      CT-HEAD W/O   Final Result      No acute intracranial abnormality is identified.      Paranasal sinus disease.      EC-ECHOCARDIOGRAM COMPLETE W/ CONT   Final Result      EC-ECHOCARDIOGRAM COMPLETE W/O CONT         DX-CHEST-PORTABLE (1 VIEW)   Final Result      1.  Hypoinflation without other evidence for acute cardiopulmonary disease.   2.  Stable  cardiomegaly.           Assessment/Plan  Orthostatic hypotension- (present on admission)   Assessment & Plan    Suspect autonomic dysfunction secondary to his diabetes.  No significant electrolyte abnormalities to suggest other etiologies.  - neurology evaluated, greatly appreciate (signed off)  - continue with midodrine and mestinon, with holding parameters for elevated BPs  - continue florinef, but increase to max dose of 0.3 mg daily (monitor K+ closely)  - possibly start on droxidopa outpatient with PCP, per neuro recs  - amiodarone discontinued per neuro recs, cards aware  - lifestyle modification such as increased water intake, abdominal or lower extremity compression (minimal improvement), head of bed to at least 30 degrees  -continue compression stockings and abdominal binder, get up slowly, continue to monitor, still quite severe last night  -salt tablets stopped as worsening diastolic HF issues  Pyridostigmine dose increased from 60 mg to 90 mg on 12/13  Pt started on clonazepam on 12/12 by psychiatry but this does not seem to have changed patient's BP in any range  Will reconsult cardiology to see if any other options or if it would be appropriate to transfer to tertiary center      Psychogenic nonepileptic seizure- (present on admission)   Assessment & Plan    Discussed with neurology again, does not appear to be orthostatic tremors as he's not orthostatic during the episodes today. No evidence that they are epileptic in nature.  EEG from 12/5 negative for epileptiform discharges during his episodes. NO ATIVAN  - neurology evaluated (signed off)  - s/p EEG on 11/26 and 12/5 negative  - psych following, greatly appreciate  - Started on Prozac and given community resources for PTSD  - monitor clinically     Acute on chronic diastolic heart failure (HCC)- (present on admission)   Assessment & Plan    ECHO with EF 60%. Given diastolic dysfunction and A fib, likely an element of lost atrial kick.  Still in  sinus rhythm.  - cardiology evaluated, signed off  - s/p 11/26 DCCV for atrial fibrillation  - still orthostatic despite cardioversion and return to sinus rhythm     Syncope- (present on admission)   Assessment & Plan    Remains orthostatic, probable autonomic dysfunction  - management as above  - Discharge on hold pending resolution or at least acceptable levels of hypotension     Paroxysmal A-fib (HCC)- (present on admission)   Assessment & Plan    Rate controlled, s/p electrical cardioversion. Now sinus rhythm  - continue xarelto  - outpatient cardiology follow-up     Essential hypertension- (present on admission)   Assessment & Plan    BP has been relatively stable but continues to be orthostatic with standing.  Will accept a higher baseline blood pressure in order to avoid the low standing blood pressures  - Not currenty on antihypertensives, highest 160's  - was previously taking metop and lisinopril     Mixed hyperlipidemia- (present on admission)   Assessment & Plan    - Low-fat low-cholesterol diet.    - Continue with statin.     GERD (gastroesophageal reflux disease)- (present on admission)   Assessment & Plan    Stable   -controlled on PPI     Type 2 diabetes mellitus, without long-term current use of insulin, with peripheral neuropathy (HCC)- (present on admission)   Assessment & Plan    A1C 6.3%, without hyperglycemia. Holding home metformin.  - Sugars are at goal and has not required correctional insulin          VTE prophylaxis: xarelto

## 2018-12-14 NOTE — PROGRESS NOTES
Cath lab called re: tilt test.     Waiting for confirmation for test time, possibly 2pm today.  Waiting for orders.

## 2018-12-14 NOTE — PROGRESS NOTES
Pannus cleaned per order, skin intact, slight redness. Nystatin powder placed, no interdry needed at this time. Mepilex also placed to sacrum, intact, red/blanching skin observed.

## 2018-12-14 NOTE — CARE PLAN
Problem: Safety  Goal: Will remain free from injury  Outcome: PROGRESSING AS EXPECTED  Bed on lowest position, call light within reach, patient educated about use of call light and safety precautions. 1 assist to stand.     Problem: Mobility  Goal: Risk for activity intolerance will decrease  Outcome: PROGRESSING AS EXPECTED  Patient not mobilized at this time due to dizziness from low bps.

## 2018-12-14 NOTE — PROGRESS NOTES
CNA reports HOTN 82/52 sitting, not orthostatic.   MD paged for updates, midodrine and increased dose of mestinon while awaiting return page.       Update: no new orders received.

## 2018-12-15 LAB
ANION GAP SERPL CALC-SCNC: 7 MMOL/L (ref 0–11.9)
BUN SERPL-MCNC: 30 MG/DL (ref 8–22)
CALCIUM SERPL-MCNC: 9.8 MG/DL (ref 8.5–10.5)
CHLORIDE SERPL-SCNC: 101 MMOL/L (ref 96–112)
CO2 SERPL-SCNC: 26 MMOL/L (ref 20–33)
CREAT SERPL-MCNC: 0.97 MG/DL (ref 0.5–1.4)
ERYTHROCYTE [DISTWIDTH] IN BLOOD BY AUTOMATED COUNT: 40.8 FL (ref 35.9–50)
GLUCOSE SERPL-MCNC: 72 MG/DL (ref 65–99)
HCT VFR BLD AUTO: 49.6 % (ref 42–52)
HGB BLD-MCNC: 16 G/DL (ref 14–18)
MCH RBC QN AUTO: 27.6 PG (ref 27–33)
MCHC RBC AUTO-ENTMCNC: 32.3 G/DL (ref 33.7–35.3)
MCV RBC AUTO: 85.7 FL (ref 81.4–97.8)
PLATELET # BLD AUTO: 185 K/UL (ref 164–446)
PMV BLD AUTO: 11.1 FL (ref 9–12.9)
POTASSIUM SERPL-SCNC: 4 MMOL/L (ref 3.6–5.5)
RBC # BLD AUTO: 5.79 M/UL (ref 4.7–6.1)
SODIUM SERPL-SCNC: 134 MMOL/L (ref 135–145)
WBC # BLD AUTO: 8.9 K/UL (ref 4.8–10.8)

## 2018-12-15 PROCEDURE — A9270 NON-COVERED ITEM OR SERVICE: HCPCS | Performed by: HOSPITALIST

## 2018-12-15 PROCEDURE — A9270 NON-COVERED ITEM OR SERVICE: HCPCS | Performed by: PSYCHIATRY & NEUROLOGY

## 2018-12-15 PROCEDURE — 700102 HCHG RX REV CODE 250 W/ 637 OVERRIDE(OP): Performed by: NURSE PRACTITIONER

## 2018-12-15 PROCEDURE — 85027 COMPLETE CBC AUTOMATED: CPT

## 2018-12-15 PROCEDURE — 700102 HCHG RX REV CODE 250 W/ 637 OVERRIDE(OP): Performed by: HOSPITALIST

## 2018-12-15 PROCEDURE — A9270 NON-COVERED ITEM OR SERVICE: HCPCS | Performed by: INTERNAL MEDICINE

## 2018-12-15 PROCEDURE — 99233 SBSQ HOSP IP/OBS HIGH 50: CPT | Performed by: HOSPITALIST

## 2018-12-15 PROCEDURE — 99233 SBSQ HOSP IP/OBS HIGH 50: CPT | Performed by: INTERNAL MEDICINE

## 2018-12-15 PROCEDURE — 700102 HCHG RX REV CODE 250 W/ 637 OVERRIDE(OP): Performed by: PSYCHIATRY & NEUROLOGY

## 2018-12-15 PROCEDURE — A9270 NON-COVERED ITEM OR SERVICE: HCPCS | Performed by: NURSE PRACTITIONER

## 2018-12-15 PROCEDURE — 80048 BASIC METABOLIC PNL TOTAL CA: CPT

## 2018-12-15 PROCEDURE — 770020 HCHG ROOM/CARE - TELE (206)

## 2018-12-15 PROCEDURE — 36415 COLL VENOUS BLD VENIPUNCTURE: CPT

## 2018-12-15 PROCEDURE — 700102 HCHG RX REV CODE 250 W/ 637 OVERRIDE(OP): Performed by: INTERNAL MEDICINE

## 2018-12-15 RX ORDER — AMIODARONE HYDROCHLORIDE 200 MG/1
50 TABLET ORAL
Status: DISCONTINUED | OUTPATIENT
Start: 2018-12-15 | End: 2018-12-18 | Stop reason: HOSPADM

## 2018-12-15 RX ORDER — SODIUM CHLORIDE 1 G/1
1 TABLET ORAL
Status: DISCONTINUED | OUTPATIENT
Start: 2018-12-15 | End: 2018-12-18 | Stop reason: HOSPADM

## 2018-12-15 RX ADMIN — PYRIDOSTIGMINE BROMIDE 90 MG: 60 TABLET ORAL at 16:38

## 2018-12-15 RX ADMIN — MIDODRINE HYDROCHLORIDE 10 MG: 5 TABLET ORAL at 16:37

## 2018-12-15 RX ADMIN — PYRIDOSTIGMINE BROMIDE 90 MG: 60 TABLET ORAL at 04:08

## 2018-12-15 RX ADMIN — PRAVASTATIN SODIUM 20 MG: 20 TABLET ORAL at 16:38

## 2018-12-15 RX ADMIN — RIVAROXABAN 20 MG: 20 TABLET, FILM COATED ORAL at 16:38

## 2018-12-15 RX ADMIN — AMIODARONE HYDROCHLORIDE 50 MG: 200 TABLET ORAL at 17:57

## 2018-12-15 RX ADMIN — NYSTATIN: 100000 POWDER TOPICAL at 04:09

## 2018-12-15 RX ADMIN — FLUOXETINE HYDROCHLORIDE 20 MG: 20 CAPSULE ORAL at 04:08

## 2018-12-15 RX ADMIN — TRIAMCINOLONE ACETONIDE: 5 CREAM TOPICAL at 16:40

## 2018-12-15 RX ADMIN — STANDARDIZED SENNA CONCENTRATE AND DOCUSATE SODIUM 2 TABLET: 8.6; 5 TABLET, FILM COATED ORAL at 04:08

## 2018-12-15 RX ADMIN — SODIUM CHLORIDE TAB 1 GM 1 G: 1 TAB at 17:58

## 2018-12-15 RX ADMIN — MIDODRINE HYDROCHLORIDE 10 MG: 5 TABLET ORAL at 11:04

## 2018-12-15 RX ADMIN — CLONAZEPAM 0.25 MG: 0.5 TABLET ORAL at 04:08

## 2018-12-15 RX ADMIN — MIDODRINE HYDROCHLORIDE 10 MG: 5 TABLET ORAL at 04:08

## 2018-12-15 RX ADMIN — FLUDROCORTISONE ACETATE 0.3 MG: 0.1 TABLET ORAL at 04:08

## 2018-12-15 RX ADMIN — NYSTATIN: 100000 POWDER TOPICAL at 16:40

## 2018-12-15 RX ADMIN — PYRIDOSTIGMINE BROMIDE 90 MG: 60 TABLET ORAL at 11:02

## 2018-12-15 RX ADMIN — NYSTATIN: 100000 POWDER TOPICAL at 11:04

## 2018-12-15 ASSESSMENT — ENCOUNTER SYMPTOMS
NERVOUS/ANXIOUS: 0
HALLUCINATIONS: 0
DIZZINESS: 1
FEVER: 0
ABDOMINAL PAIN: 0
VOMITING: 0
NAUSEA: 0
DEPRESSION: 1
SHORTNESS OF BREATH: 0
WEAKNESS: 1
FALLS: 1

## 2018-12-15 ASSESSMENT — PAIN SCALES - GENERAL
PAINLEVEL_OUTOF10: 0

## 2018-12-15 NOTE — PROGRESS NOTES
Transfer report given to ELISA Marquez at bedside. Telemetry nurse remaining at bedside until room is ready on tele 8. Patient stable and monitored.

## 2018-12-15 NOTE — CARE PLAN
Problem: Safety  Goal: Will remain free from injury  Outcome: PROGRESSING AS EXPECTED  Fall precautions in place.

## 2018-12-15 NOTE — PROGRESS NOTES
Hospital Medicine Daily Progress Note    Date of Service  12/15/2018    Chief Complaint  53 y.o. male admitted 11/21/2018 with shortness of breath and leg swelling in addition to 40 pounds of weight over 2 weeks    Hospital Course  Presented with complaints of leg swelling, and 40 pound weight gain with bilateral leg edema, increased swelling consistent with exacerbation of congestive heart failure patient was diuresed but then had decreased sodium level and severe orthostasis and postural hypotension has continued to be an issue.    ECHO performed 11/22:  Prior echo done 07/09/18, EF now normal.  Quality improved with contrast.  Left ventricular ejection fraction is visually estimated to be 60%.  Mild concentric left ventricular hypertrophy.  Mild mitral annular calcifcation, mild MR.  Mild tricuspid regurgitation.  Estimated right ventricular systolic pressure  is 30 mmHg.    Interval Problem Update  I discussed case with Dr. Bari Renteria as patient continues to be orthostatic despite extensive measures. He recommends transferring patient back to telemetry to assess chronotropic response to patient sitting/standing. He will be resuming amiodarone and would also like to see if this has any inhibitory response on patient's heart rate.  Plan for tilt table test tomorrow and patient may need pacemaker depending on results.  POC discussed with patient and he is agreeable to plan. He is also open to resuming salt tablets again.     Consultants/Specialty  Cards  Neurology    Code Status  Full    Disposition  Transfer to telemetry    Review of Systems  Review of Systems   Constitutional: Negative for fever.   Respiratory: Negative for shortness of breath.    Cardiovascular: Positive for leg swelling.   Gastrointestinal: Negative for abdominal pain, nausea and vomiting.   Genitourinary: Negative for urgency.   Musculoskeletal: Positive for falls. Negative for joint pain.   Neurological: Positive for dizziness  (Orthostatic, upon standing) and weakness (minimal change).   Psychiatric/Behavioral: Positive for depression. Negative for hallucinations. The patient is not nervous/anxious.    All other systems reviewed and are negative.       Physical Exam  Temp:  [36.2 °C (97.2 °F)-37 °C (98.6 °F)] 37 °C (98.6 °F)  Pulse:  [] 61  Resp:  [16-22] 16  BP: ()/(47-75) 127/74    Physical Exam   Constitutional: He is oriented to person, place, and time. He appears well-developed.   HENT:   Head: Normocephalic and atraumatic.   Mouth/Throat: Oropharynx is clear and moist.   Eyes: Conjunctivae and EOM are normal. No scleral icterus.   Neck: Normal range of motion.   Cardiovascular: Normal rate, regular rhythm and intact distal pulses.    No murmur heard.  Pulmonary/Chest: Effort normal. No stridor. No respiratory distress.   Abdominal: Soft. There is no tenderness.   Musculoskeletal: Normal range of motion. He exhibits edema (compression stockings in place). He exhibits no tenderness or deformity.   Warm peripherally     Neurological: He is alert and oriented to person, place, and time. He has normal strength. No sensory deficit.   Skin: Skin is warm and dry. No rash noted. No erythema.   Psychiatric: He has a normal mood and affect. His speech is delayed. He is slowed.   Flat/odd affect but answers questions appropriately, remains unchanged today   Nursing note and vitals reviewed.      Fluids    Intake/Output Summary (Last 24 hours) at 12/15/18 1653  Last data filed at 12/15/18 0415   Gross per 24 hour   Intake              280 ml   Output              200 ml   Net               80 ml       Laboratory  Recent Labs      12/15/18   0022   WBC  8.9   RBC  5.79   HEMOGLOBIN  16.0   HEMATOCRIT  49.6   MCV  85.7   MCH  27.6   MCHC  32.3*   RDW  40.8   PLATELETCT  185   MPV  11.1     Recent Labs      12/13/18   0241  12/15/18   0022   SODIUM  133*  134*   POTASSIUM  4.3  4.0   CHLORIDE  103  101   CO2  25  26   GLUCOSE  91  72    BUN  27*  30*   CREATININE  0.83  0.97   CALCIUM  9.7  9.8                   Imaging  CT-HEAD W/O   Final Result      No acute intracranial abnormality is identified.      Paranasal sinus disease.      CT-HEAD W/O   Final Result      No acute intracranial abnormality is identified.      Paranasal sinus disease.      EC-ECHOCARDIOGRAM COMPLETE W/ CONT   Final Result      EC-ECHOCARDIOGRAM COMPLETE W/O CONT         DX-CHEST-PORTABLE (1 VIEW)   Final Result      1.  Hypoinflation without other evidence for acute cardiopulmonary disease.   2.  Stable cardiomegaly.           Assessment/Plan  * Orthostatic hypotension- (present on admission)   Assessment & Plan    Suspect autonomic dysfunction secondary to his diabetes.  No significant electrolyte abnormalities to suggest other etiologies.  - neurology evaluated, greatly appreciate (signed off)  - continue with midodrine and mestinon  - continue florinef at max dose of 0.3 mg daily (monitor K+ closely)  - possibly start on droxidopa outpatient with PCP, per neuro recs  - lifestyle modification such as increased water intake, abdominal or lower extremity compression (minimal improvement), head of bed to at least 30 degrees  -continue compression stockings and abdominal binder, get up slowly  Pyridostigmine dose increased from 60 mg to 90 mg on 12/13  Pt started on clonazepam on 12/12 by psychiatry but this does not seem to have changed patient's BP in any range  Cardiology re consulted 12/15: plan to restart amiodarone, monitor on tele, plan for tilt table test on 12/17, after discussion with patient will resume salt tabs (were stopped previously d/t worsening lower extremity edema)     Psychogenic nonepileptic seizure- (present on admission)   Assessment & Plan    Discussed with neurology again, does not appear to be orthostatic tremors as he's not orthostatic during the episodes today. No evidence that they are epileptic in nature.  EEG from 12/5 negative for  epileptiform discharges during his episodes. NO ATIVAN  - neurology evaluated (signed off)  - s/p EEG on 11/26 and 12/5 negative  - psych following, greatly appreciate  - Started on Prozac and given community resources for PTSD  - monitor clinically     Acute on chronic diastolic heart failure (HCC)- (present on admission)   Assessment & Plan    ECHO with EF 60%. Given diastolic dysfunction and A fib, likely an element of lost atrial kick.  Still in sinus rhythm.  - cardiology evaluated, signed off  - s/p 11/26 DCCV for atrial fibrillation  - still orthostatic despite cardioversion and return to sinus rhythm     Syncope- (present on admission)   Assessment & Plan    Remains orthostatic, probable autonomic dysfunction  - management as above  - Discharge on hold pending resolution or at least acceptable levels of hypotension     Paroxysmal A-fib (HCC)- (present on admission)   Assessment & Plan    Rate controlled, s/p electrical cardioversion. Now sinus rhythm  - continue xarelto  - outpatient cardiology follow-up     Essential hypertension- (present on admission)   Assessment & Plan    BP has been relatively stable but continues to be orthostatic with standing.  Will accept a higher baseline blood pressure in order to avoid the low standing blood pressures  - Not currenty on antihypertensives, highest 160's  - was previously taking metop and lisinopril     Mixed hyperlipidemia- (present on admission)   Assessment & Plan    - Low-fat low-cholesterol diet.    - Continue with statin.     GERD (gastroesophageal reflux disease)- (present on admission)   Assessment & Plan    Stable   -controlled on PPI     Type 2 diabetes mellitus, without long-term current use of insulin, with peripheral neuropathy (HCC)- (present on admission)   Assessment & Plan    A1C 6.3%, without hyperglycemia. Holding home metformin.  - Sugars are at goal and has not required correctional insulin          VTE prophylaxis: xarelto

## 2018-12-15 NOTE — CARE PLAN
Problem: Infection  Goal: Will remain free from infection  Outcome: PROGRESSING AS EXPECTED  Assessed for signs and symptoms of infection. Standard precautions implemented. Education provided to patient and visitors about hand hygiene.        Problem: Knowledge Deficit  Goal: Knowledge of disease process/condition, treatment plan, diagnostic tests, and medications will improve  Outcome: PROGRESSING AS EXPECTED  Pt educated regarding plan of care for the day, activity, diet, and medications. Verbalized understanding.

## 2018-12-15 NOTE — PROGRESS NOTES
Patient transferred to T808-1 from S530-2. All personal belonings, chart and medications with patient to new room. Tele box on and monitor room notified. MR:  60s. VSS. Pt updated on POC

## 2018-12-15 NOTE — PROGRESS NOTES
Assumed care. Patient hooked up to zoll MR: SR 60s. VSS. Patient updated on POC. Transfer to [facility] once room is cleaned. No further needs at this time

## 2018-12-15 NOTE — PROGRESS NOTES
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

## 2018-12-16 LAB
ANION GAP SERPL CALC-SCNC: 8 MMOL/L (ref 0–11.9)
BUN SERPL-MCNC: 23 MG/DL (ref 8–22)
CALCIUM SERPL-MCNC: 9.7 MG/DL (ref 8.5–10.5)
CHLORIDE SERPL-SCNC: 103 MMOL/L (ref 96–112)
CO2 SERPL-SCNC: 25 MMOL/L (ref 20–33)
CORTIS SERPL-MCNC: 4.8 UG/DL (ref 0–23)
CREAT SERPL-MCNC: 0.76 MG/DL (ref 0.5–1.4)
GLUCOSE SERPL-MCNC: 89 MG/DL (ref 65–99)
POTASSIUM SERPL-SCNC: 4.1 MMOL/L (ref 3.6–5.5)
SODIUM SERPL-SCNC: 136 MMOL/L (ref 135–145)

## 2018-12-16 PROCEDURE — 99232 SBSQ HOSP IP/OBS MODERATE 35: CPT | Performed by: FAMILY MEDICINE

## 2018-12-16 PROCEDURE — A9270 NON-COVERED ITEM OR SERVICE: HCPCS | Performed by: PSYCHIATRY & NEUROLOGY

## 2018-12-16 PROCEDURE — A9270 NON-COVERED ITEM OR SERVICE: HCPCS | Performed by: HOSPITALIST

## 2018-12-16 PROCEDURE — 36415 COLL VENOUS BLD VENIPUNCTURE: CPT

## 2018-12-16 PROCEDURE — 700102 HCHG RX REV CODE 250 W/ 637 OVERRIDE(OP): Performed by: INTERNAL MEDICINE

## 2018-12-16 PROCEDURE — 700102 HCHG RX REV CODE 250 W/ 637 OVERRIDE(OP): Performed by: HOSPITALIST

## 2018-12-16 PROCEDURE — 770020 HCHG ROOM/CARE - TELE (206)

## 2018-12-16 PROCEDURE — 80048 BASIC METABOLIC PNL TOTAL CA: CPT

## 2018-12-16 PROCEDURE — 82024 ASSAY OF ACTH: CPT

## 2018-12-16 PROCEDURE — 82533 TOTAL CORTISOL: CPT

## 2018-12-16 PROCEDURE — A9270 NON-COVERED ITEM OR SERVICE: HCPCS | Performed by: INTERNAL MEDICINE

## 2018-12-16 PROCEDURE — A9270 NON-COVERED ITEM OR SERVICE: HCPCS | Performed by: NURSE PRACTITIONER

## 2018-12-16 PROCEDURE — 700102 HCHG RX REV CODE 250 W/ 637 OVERRIDE(OP): Performed by: NURSE PRACTITIONER

## 2018-12-16 PROCEDURE — 700102 HCHG RX REV CODE 250 W/ 637 OVERRIDE(OP): Performed by: PSYCHIATRY & NEUROLOGY

## 2018-12-16 RX ADMIN — MIDODRINE HYDROCHLORIDE 10 MG: 5 TABLET ORAL at 17:09

## 2018-12-16 RX ADMIN — MIDODRINE HYDROCHLORIDE 10 MG: 5 TABLET ORAL at 11:47

## 2018-12-16 RX ADMIN — MIDODRINE HYDROCHLORIDE 10 MG: 5 TABLET ORAL at 05:45

## 2018-12-16 RX ADMIN — TRIAMCINOLONE ACETONIDE: 5 CREAM TOPICAL at 05:44

## 2018-12-16 RX ADMIN — FLUDROCORTISONE ACETATE 0.3 MG: 0.1 TABLET ORAL at 05:45

## 2018-12-16 RX ADMIN — SODIUM CHLORIDE TAB 1 GM 1 G: 1 TAB at 17:10

## 2018-12-16 RX ADMIN — TRIAMCINOLONE ACETONIDE: 5 CREAM TOPICAL at 18:00

## 2018-12-16 RX ADMIN — PYRIDOSTIGMINE BROMIDE 90 MG: 60 TABLET ORAL at 11:47

## 2018-12-16 RX ADMIN — SODIUM CHLORIDE TAB 1 GM 1 G: 1 TAB at 11:47

## 2018-12-16 RX ADMIN — NYSTATIN: 100000 POWDER TOPICAL at 18:00

## 2018-12-16 RX ADMIN — SODIUM CHLORIDE TAB 1 GM 1 G: 1 TAB at 05:45

## 2018-12-16 RX ADMIN — FLUOXETINE HYDROCHLORIDE 20 MG: 20 CAPSULE ORAL at 05:45

## 2018-12-16 RX ADMIN — NYSTATIN: 100000 POWDER TOPICAL at 05:44

## 2018-12-16 RX ADMIN — PYRIDOSTIGMINE BROMIDE 90 MG: 60 TABLET ORAL at 05:45

## 2018-12-16 RX ADMIN — STANDARDIZED SENNA CONCENTRATE AND DOCUSATE SODIUM 2 TABLET: 8.6; 5 TABLET, FILM COATED ORAL at 17:09

## 2018-12-16 RX ADMIN — AMIODARONE HYDROCHLORIDE 50 MG: 200 TABLET ORAL at 05:46

## 2018-12-16 RX ADMIN — PYRIDOSTIGMINE BROMIDE 90 MG: 60 TABLET ORAL at 17:09

## 2018-12-16 RX ADMIN — NYSTATIN: 100000 POWDER TOPICAL at 11:49

## 2018-12-16 RX ADMIN — PRAVASTATIN SODIUM 20 MG: 20 TABLET ORAL at 17:09

## 2018-12-16 RX ADMIN — CLONAZEPAM 0.25 MG: 0.5 TABLET ORAL at 05:45

## 2018-12-16 RX ADMIN — RIVAROXABAN 20 MG: 20 TABLET, FILM COATED ORAL at 17:09

## 2018-12-16 ASSESSMENT — ENCOUNTER SYMPTOMS
WHEEZING: 0
CHILLS: 0
BLURRED VISION: 0
FEVER: 0
DIZZINESS: 1
APNEA: 0
WEAKNESS: 1
VOMITING: 0
MYALGIAS: 0
NAUSEA: 0
SHORTNESS OF BREATH: 0
ABDOMINAL PAIN: 0
TINGLING: 0
NERVOUS/ANXIOUS: 1
HEMOPTYSIS: 0
STRIDOR: 0
LOSS OF CONSCIOUSNESS: 1
DOUBLE VISION: 0
TREMORS: 0
HALLUCINATIONS: 0
CHEST TIGHTNESS: 0
DEPRESSION: 1
COUGH: 0
FALLS: 1
WEIGHT LOSS: 0
PHOTOPHOBIA: 0
CHOKING: 0
FOCAL WEAKNESS: 0
PALPITATIONS: 0

## 2018-12-16 ASSESSMENT — LIFESTYLE VARIABLES: SUBSTANCE_ABUSE: 0

## 2018-12-16 ASSESSMENT — PAIN SCALES - GENERAL
PAINLEVEL_OUTOF10: 0

## 2018-12-16 NOTE — PROGRESS NOTES
Bedside report received. Pt is resting in bed.  Patient A&O x 4 on RA.  No complaints of pain or SOB at this time.  POC discussed with patient.  Patient verbalized understanding.  Call light and belongings with in reach.  Bed locked and in lowest position, alarm and fall precautions in place.

## 2018-12-16 NOTE — CARE PLAN
Problem: Safety  Goal: Will remain free from injury    Intervention: Provide assistance with mobility  Pt instructed on importance of using call light and call light placed within reach.       Problem: Infection  Goal: Will remain free from infection  Outcome: PROGRESSING AS EXPECTED  Discussed with patient the importance of handwashing. Pt demonstrates proper handwashing.

## 2018-12-16 NOTE — PROGRESS NOTES
[facility] Cardiology Follow-up Consult Note    Date of note:    12/15/2018      Consulting Physician: Lesley Fregoso M.D.    Patient ID:  Name:   Sebastián Castro     YOB: 1965  Age:   53 y.o.  male   MRN:   [id number]    Chief Complaint   Patient presents with   • Chest Pain     pt c/o chronic right sided chest pain x several months worse over the last 2 weeks   • Shortness of Breath     chronic SOB with his chf, worse in past 2 weeks.    • Peripheral Edema     BLE edema worse in last two weeks aswel.        ID: 53-year-old man with a history of paroxysmal atrial fibrillation, sleep apnea, tachycardia mediated cardiomyopathy with LVEF 40% by echocardiogram 7/9/18 and LVEF recovered to normal now after a rhythm control strategy for atrial fibrillation.  He has type II diabetes with neuropathy on oral hypoglycemics. He was admitted originally with a CHF exacerbation, and after diuresis developed fairly severe orthostatic hypotension for which he has been placed on high doses of midodrine and fluorinef with persistent symptoms and severe postural orthostasis. He has been attempted on salt tabs with Cardiology is called related to persistent orthostatic hypotension.    Interim Events:  Since we signed off on 12/5, persistent orthostatic hypotension    Orthostatic vital signs were: Supine 147/74 with a heart rate of 75, sitting 118/71 with heart rate of 788, and standing 72/47 with a heart rate of 115 bpm      ROS  No NV, No Bleeding, No dizziness   All other review of systems reviewed and negative.    Past medical, surgical, social, and family history reviewed and unchanged from admission except as noted in assessment and plan.    Medications: Reviewed in MAR  Current Facility-Administered Medications   Medication Dose Frequency Provider Last Rate Last Dose   • amiodarone (CORDARONE) tablet 50 mg  50 mg Q DAY Lesley Fregoso M.D.       • sodium chloride (SALT) tablet 1 g  1 g TID WITH MEALS Lesley Fregoso M.D.        • pyridostigmine (MESTINON) tablet 90 mg  90 mg TID Lesley Fregoso M.D.   90 mg at 12/15/18 1638   • clonazePAM (KLONOPIN) tablet 0.25 mg  0.25 mg DAILY JAIDA Chery.P.R.NVane   0.25 mg at 12/15/18 0408   • fludrocortisone (FLORINEF) tablet 0.3 mg  0.3 mg YUE Patel M.D.   0.3 mg at 12/15/18 0408   • FLUoxetine (PROZAC) capsule 20 mg  20 mg DAILY Lena Patel M.D.   20 mg at 12/15/18 0408   • midodrine (PROAMATINE) tablet 10 mg  10 mg TID Lesley Fregoso M.D.   10 mg at 12/15/18 1637   • triamcinolone acetonide (KENALOG) 0.5 % cream   BID Yolie Ceron M.D.       • hydrALAZINE (APRESOLINE) injection 10 mg  10 mg Q4HRS PRN Carson Briggs D.O.       • furosemide (LASIX) tablet 20 mg  20 mg PRN Carly Peña, A.P.R.N.       • potassium chloride SA (Kdur) tablet 20 mEq  20 mEq PRN Carly Peña A.P.R.N.       • pravastatin (PRAVACHOL) tablet 20 mg  20 mg Q EVENING Sandra Mccann M.D.   20 mg at 12/15/18 1638   • rivaroxaban (XARELTO) tablet 20 mg  20 mg PM MEAL Sandra Mccann M.D.   20 mg at 12/15/18 1638   • senna-docusate (PERICOLACE or SENOKOT S) 8.6-50 MG per tablet 2 Tab  2 Tab BID Sandra Mccann M.D.   2 Tab at 12/15/18 0408    And   • polyethylene glycol/lytes (MIRALAX) PACKET 1 Packet  1 Packet QDAY PRN Sandra Mccann M.D.        And   • magnesium hydroxide (MILK OF MAGNESIA) suspension 30 mL  30 mL QDAY PRN Sandra Mccann M.D.        And   • bisacodyl (DULCOLAX) suppository 10 mg  10 mg QDAY PRN Sandra Mccann M.D.       • acetaminophen (TYLENOL) tablet 650 mg  650 mg Q6HRS PRN Sandra Mccann M.D.   650 mg at 12/05/18 2321   • ondansetron (ZOFRAN) syringe/vial injection 4 mg  4 mg Q4HRS PRN Sandra Mccann M.D.       • ondansetron (ZOFRAN ODT) dispertab 4 mg  4 mg Q4HRS PRN Sandra Mccann M.D.       • promethazine (PHENERGAN) tablet 12.5-25 mg  12.5-25 mg Q4HRS PRN Sandra Mccann M.D.       • promethazine (PHENERGAN) suppository 12.5-25 mg  12.5-25 mg  "Q4HRS PRN Sandra Mccann M.D.       • prochlorperazine (COMPAZINE) injection 5-10 mg  5-10 mg Q4HRS PRN Sandra Mccann M.D.       • nystatin (MYCOSTATIN) powder   TID Sandra Mccann M.D.         Last reviewed on 12/7/2018  2:53 PM by Amanda Nicole R.N.  Allergies   Allergen Reactions   • Daptomycin Rash     Body rash  RXN=1/2018     • Pcn [Penicillins] Hives and Rash      Rash & hives  RXN=since a kid   • Unasyn [Ampicillin-Sulbactam Sodium] Hives, Itching and Swelling   • Vancomycin Rash     Red man syndrome       Physical Exam  Body mass index is 31.45 kg/m². Blood pressure 127/74, pulse 61, temperature 37 °C (98.6 °F), temperature source Temporal, resp. rate 16, height 1.829 m (6' 0.01\"), weight 105.2 kg (231 lb 14.8 oz), SpO2 97 %. Vitals:    12/15/18 1050 12/15/18 1127 12/15/18 1200 12/15/18 1640   BP: (!) 72/47 135/75  127/74   Pulse: (!) 115 67 61    Resp: (!) 22 20 16    Temp:   37 °C (98.6 °F)    TempSrc:   Temporal    SpO2: 93% 95% 97%    Weight:       Height:        Oxygen Therapy:  Pulse Oximetry: 97 %, O2 (LPM): 0, O2 Delivery: None (Room Air)    General: no apparent distress  Eyes: nl conjunctiva  ENT: OP clear  Neck: no JVD   Lungs: normal respiratory effort, CTAB  Heart: RRR, no murmurs, no rubs or gallops,   EXT trace edema bilateral lower extremities. 1+ pedal pulses. no cyanosis  Abdomen: soft, non tender, non distended,  Neurological: No focal deficits  Psychiatric: Appropriate affect, A/O x 3  Skin: Warm extremities    Labs (personally reviewed and notable for):   Recent Results (from the past 24 hour(s))   BASIC METABOLIC PANEL    Collection Time: 12/15/18 12:22 AM   Result Value Ref Range    Sodium 134 (L) 135 - 145 mmol/L    Potassium 4.0 3.6 - 5.5 mmol/L    Chloride 101 96 - 112 mmol/L    Co2 26 20 - 33 mmol/L    Glucose 72 65 - 99 mg/dL    Bun 30 (H) 8 - 22 mg/dL    Creatinine 0.97 0.50 - 1.40 mg/dL    Calcium 9.8 8.5 - 10.5 mg/dL    Anion Gap 7.0 0.0 - 11.9   CBC WITHOUT " DIFFERENTIAL    Collection Time: 12/15/18 12:22 AM   Result Value Ref Range    WBC 8.9 4.8 - 10.8 K/uL    RBC 5.79 4.70 - 6.10 M/uL    Hemoglobin 16.0 14.0 - 18.0 g/dL    Hematocrit 49.6 42.0 - 52.0 %    MCV 85.7 81.4 - 97.8 fL    MCH 27.6 27.0 - 33.0 pg    MCHC 32.3 (L) 33.7 - 35.3 g/dL    RDW 40.8 35.9 - 50.0 fL    Platelet Count 185 164 - 446 K/uL    MPV 11.1 9.0 - 12.9 fL   ESTIMATED GFR    Collection Time: 12/15/18 12:22 AM   Result Value Ref Range    GFR If African American >60 >60 mL/min/1.73 m 2    GFR If Non African American >60 >60 mL/min/1.73 m 2       Cardiac Imaging and Procedures Review:    EKG and telemetry tracings personally reviewed    Serial echocardiograms reviewed as above    Impression and Medical Decision Making:  Principal Problem:    Orthostatic hypotension POA: Yes  Active Problems:    Acute on chronic diastolic heart failure (HCC) POA: Yes    Psychogenic nonepileptic seizure POA: Yes    Type 2 diabetes mellitus, without long-term current use of insulin, with peripheral neuropathy (HCC) POA: Yes    GERD (gastroesophageal reflux disease) (Chronic) POA: Yes    Mixed hyperlipidemia POA: Yes    Essential hypertension POA: Yes    Paroxysmal A-fib (HCC) POA: Yes    Syncope POA: Yes  Resolved Problems:    Hyponatremia POA: Yes      Orthostatic hypotension: Certainly, this is multifactorial in relation to previous systolic dysfunction, type 2 diabetes with neuropathy, and dysautonomia from all of those pathologies.  I do not see as of yet investigation for adrenal insufficiency and I did order a cortisol level and ACTH to be drawn in the morning at 3 AM.  If those indicate adrenal insufficiency steroids other than fludrocortisone could be given potentially to improve symptoms.  I do think that he should have a tilt table test on Monday, and repeat evaluation for possibly a pacemaker with closed loop stimulation to prevent the severity of such episodes.  For now, despite his heart failure with  recovered ejection fraction I have encouraged fluids and liberalize salt.    Heart failure with preserved ejection fraction: Heart failure therapy contraindicated, euvolemic on physical examination    Paroxysmal atrial fibrillation: I do have significant concerned that should he go back into rapid atrial fibrillation his orthostasis and hypotension will be considerably worse.  Despite the fact that amiodarone would blunt his tachycardic response I do think he may be able to tolerate 50 mg p.o. daily and I have added that back after discussion with nursing.  He should continue Xarelto for stroke prevention as previously discussed.      I personally discussed his case with  Dr Lesley Fregoso M.D.    Future Appointments  Date Time Provider Department Center   12/20/2018 9:15 AM [facility] EXAM 5 [facility] None   12/24/2018 12:15 PM Sirena Larson M.D. [facility] None   12/28/2018 3:30 PM German Hinton M.D. [facility] None       Thank you for allowing me to participate in the care of this patient.  Please call or text via [employer] if clarification would be useful.    Bari Renteria MD  Cardiologist, [facility] [place]

## 2018-12-16 NOTE — PROGRESS NOTES
Cardiology Follow Up Progress Note    Date of Service  12/16/2018    Attending Physician  Sathish Patel M.D.    Reason for consultation   volume overload ( 40# weight gain over 2 weeks ), LE edema, dyspnea,  acute on chronic diastolic heart failure, responded to aggressive diuresis but developed postural hypotension, , initially cardiology signed off on 12/5/18, reconsulted on 12/15/18 secondary to persistent and severe orthostasis      History of     PAF, sleep apnea hypertension, dyslipidemia, type 2 diabetes, cardiomyopathy, LVEF 40% (tachycardia mediated), with recovery of LV EF by echo on 7/2018, psychogenic nonepileptic seizure, GERD    Admitted for   Dyspnea, severe LE edema, 40 pounds weight gain      Interim Events    12/16/18, maintaining normal sinus rhythm, ambulated in the russell without complaints of dizziness and lightheadedness, will request repeat standing blood pressure this afternoon     Review of Systems  Review of Systems   Respiratory: Negative for apnea, cough, choking, chest tightness, shortness of breath, wheezing and stridor.    Cardiovascular: Positive for leg swelling. Negative for chest pain.        LE edema improved       Vital signs in last 24 hours  Temp:  [36.1 °C (97 °F)-37.3 °C (99.1 °F)] 36.2 °C (97.1 °F)  Pulse:  [] 71  Resp:  [16-22] 16  BP: ()/(47-83) 112/80    Physical Exam  Physical Exam   Constitutional: He is oriented to person, place, and time.   HENT:   Head: Normocephalic.   Eyes: Conjunctivae are normal.   Neck: No thyromegaly present.   Cardiovascular: Normal rate and regular rhythm.    Pulses:       Carotid pulses are 2+ on the right side, and 2+ on the left side.       Radial pulses are 2+ on the right side, and 2+ on the left side.   Pulmonary/Chest: He has no wheezes.   Abdominal: Soft.   Musculoskeletal: He exhibits edema.   Neurological: He is alert and oriented to person, place, and time.   Skin: Skin is warm and dry.       Lab Review  Lab Results    Component Value Date/Time    WBC 8.9 12/15/2018 12:22 AM    RBC 5.79 12/15/2018 12:22 AM    HEMOGLOBIN 16.0 12/15/2018 12:22 AM    HEMATOCRIT 49.6 12/15/2018 12:22 AM    MCV 85.7 12/15/2018 12:22 AM    MCH 27.6 12/15/2018 12:22 AM    MCHC 32.3 (L) 12/15/2018 12:22 AM    MPV 11.1 12/15/2018 12:22 AM      Lab Results   Component Value Date/Time    SODIUM 136 12/16/2018 02:48 AM    POTASSIUM 4.1 12/16/2018 02:48 AM    CHLORIDE 103 12/16/2018 02:48 AM    CO2 25 12/16/2018 02:48 AM    GLUCOSE 89 12/16/2018 02:48 AM    BUN 23 (H) 12/16/2018 02:48 AM    CREATININE 0.76 12/16/2018 02:48 AM      Lab Results   Component Value Date/Time    ASTSGOT 22 12/04/2018 10:28 AM    ALTSGPT 27 12/04/2018 10:28 AM     Lab Results   Component Value Date/Time    CHOLSTRLTOT 104 11/22/2018 12:07 AM    LDL 60 11/22/2018 12:07 AM    HDL 27 (A) 11/22/2018 12:07 AM    TRIGLYCERIDE 87 11/22/2018 12:07 AM    TROPONINI <0.01 12/04/2018 10:28 AM               Assessment  Severe & persistent orthostatic hypotension (cardiology signed off  12/5/18, reconsulted  12/15/18 due to persistent orthostatic hypotension despite Florinef , midodrine, salt tablets, mestinon )  HFpEF  LVEF 60% echo 11/22/18  DIabetes with neuropathy  Obesity, BMI 38  Atrial fibrillation s/p successful DCCV 11/26/18 , low dose Amiodarone , in NSR  Chronic anticoagulation with xarelto 20 mg  Hypertension,  controlled on above meds  Hyperlipidemia, HDL 27, LDL 60        Plan    -Adrenal insufficiency workup pending  -Tilt table test on Monday 12/17/18  -HX of PAF, maintaing NSR on Amiodarone 50 mg daily, continue with low dose while orthostasis persists  -On Xarelto  -Repeat orthostatic BP, he was able to ambulate in the russell without complaint of dizziness and/or lightheadedness ( improved significantly from a week ago, unable to stand more than few seconds without near syncope )

## 2018-12-17 ENCOUNTER — APPOINTMENT (OUTPATIENT)
Dept: VASCULAR LAB | Facility: MEDICAL CENTER | Age: 53
End: 2018-12-17
Payer: MEDICAID

## 2018-12-17 ENCOUNTER — APPOINTMENT (OUTPATIENT)
Dept: INTERNAL MEDICINE | Facility: MEDICAL CENTER | Age: 53
End: 2018-12-17
Payer: MEDICAID

## 2018-12-17 LAB
ACTH PLAS-MCNC: 24 PG/ML (ref 7–69)
APTT PPP: 34.6 SEC (ref 24.7–36)
ERYTHROCYTE [DISTWIDTH] IN BLOOD BY AUTOMATED COUNT: 40.8 FL (ref 35.9–50)
HCT VFR BLD AUTO: 49.4 % (ref 42–52)
HGB BLD-MCNC: 16.5 G/DL (ref 14–18)
INR PPP: 1.32 (ref 0.87–1.13)
MCH RBC QN AUTO: 28.4 PG (ref 27–33)
MCHC RBC AUTO-ENTMCNC: 33.4 G/DL (ref 33.7–35.3)
MCV RBC AUTO: 84.9 FL (ref 81.4–97.8)
PLATELET # BLD AUTO: 192 K/UL (ref 164–446)
PMV BLD AUTO: 11.2 FL (ref 9–12.9)
PROTHROMBIN TIME: 16.5 SEC (ref 12–14.6)
RBC # BLD AUTO: 5.82 M/UL (ref 4.7–6.1)
WBC # BLD AUTO: 7.2 K/UL (ref 4.8–10.8)

## 2018-12-17 PROCEDURE — A9270 NON-COVERED ITEM OR SERVICE: HCPCS | Performed by: HOSPITALIST

## 2018-12-17 PROCEDURE — 99152 MOD SED SAME PHYS/QHP 5/>YRS: CPT | Performed by: INTERNAL MEDICINE

## 2018-12-17 PROCEDURE — 770020 HCHG ROOM/CARE - TELE (206)

## 2018-12-17 PROCEDURE — 700111 HCHG RX REV CODE 636 W/ 250 OVERRIDE (IP): Performed by: FAMILY MEDICINE

## 2018-12-17 PROCEDURE — B2151ZZ FLUOROSCOPY OF LEFT HEART USING LOW OSMOLAR CONTRAST: ICD-10-PCS | Performed by: INTERNAL MEDICINE

## 2018-12-17 PROCEDURE — 700117 HCHG RX CONTRAST REV CODE 255: Performed by: INTERNAL MEDICINE

## 2018-12-17 PROCEDURE — 93460 R&L HRT ART/VENTRICLE ANGIO: CPT | Mod: 26 | Performed by: INTERNAL MEDICINE

## 2018-12-17 PROCEDURE — 4A023N8 MEASUREMENT OF CARDIAC SAMPLING AND PRESSURE, BILATERAL, PERCUTANEOUS APPROACH: ICD-10-PCS | Performed by: INTERNAL MEDICINE

## 2018-12-17 PROCEDURE — 85730 THROMBOPLASTIN TIME PARTIAL: CPT

## 2018-12-17 PROCEDURE — 360979 HCHG DIAGNOSTIC CATH

## 2018-12-17 PROCEDURE — 360980 HCHG SINGLE TRANSDUCER

## 2018-12-17 PROCEDURE — C1894 INTRO/SHEATH, NON-LASER: HCPCS

## 2018-12-17 PROCEDURE — C1769 GUIDE WIRE: HCPCS

## 2018-12-17 PROCEDURE — A9270 NON-COVERED ITEM OR SERVICE: HCPCS | Performed by: PSYCHIATRY & NEUROLOGY

## 2018-12-17 PROCEDURE — 700102 HCHG RX REV CODE 250 W/ 637 OVERRIDE(OP): Performed by: PSYCHIATRY & NEUROLOGY

## 2018-12-17 PROCEDURE — 307093 HCHG TR BAND RADIAL

## 2018-12-17 PROCEDURE — 99153 MOD SED SAME PHYS/QHP EA: CPT

## 2018-12-17 PROCEDURE — 97116 GAIT TRAINING THERAPY: CPT

## 2018-12-17 PROCEDURE — A9270 NON-COVERED ITEM OR SERVICE: HCPCS | Performed by: INTERNAL MEDICINE

## 2018-12-17 PROCEDURE — 700105 HCHG RX REV CODE 258: Performed by: INTERNAL MEDICINE

## 2018-12-17 PROCEDURE — A9270 NON-COVERED ITEM OR SERVICE: HCPCS | Performed by: NURSE PRACTITIONER

## 2018-12-17 PROCEDURE — 36415 COLL VENOUS BLD VENIPUNCTURE: CPT

## 2018-12-17 PROCEDURE — 99152 MOD SED SAME PHYS/QHP 5/>YRS: CPT

## 2018-12-17 PROCEDURE — B2111ZZ FLUOROSCOPY OF MULTIPLE CORONARY ARTERIES USING LOW OSMOLAR CONTRAST: ICD-10-PCS | Performed by: INTERNAL MEDICINE

## 2018-12-17 PROCEDURE — C1887 CATHETER, GUIDING: HCPCS

## 2018-12-17 PROCEDURE — 99231 SBSQ HOSP IP/OBS SF/LOW 25: CPT | Performed by: FAMILY MEDICINE

## 2018-12-17 PROCEDURE — 93460 R&L HRT ART/VENTRICLE ANGIO: CPT

## 2018-12-17 PROCEDURE — 700102 HCHG RX REV CODE 250 W/ 637 OVERRIDE(OP): Performed by: HOSPITALIST

## 2018-12-17 PROCEDURE — 700101 HCHG RX REV CODE 250

## 2018-12-17 PROCEDURE — 700102 HCHG RX REV CODE 250 W/ 637 OVERRIDE(OP): Performed by: NURSE PRACTITIONER

## 2018-12-17 PROCEDURE — 700111 HCHG RX REV CODE 636 W/ 250 OVERRIDE (IP)

## 2018-12-17 PROCEDURE — 85610 PROTHROMBIN TIME: CPT

## 2018-12-17 PROCEDURE — 361014 HCHG 6FR ARROW SWAN/THERMO

## 2018-12-17 PROCEDURE — 99233 SBSQ HOSP IP/OBS HIGH 50: CPT | Performed by: INTERNAL MEDICINE

## 2018-12-17 PROCEDURE — 85027 COMPLETE CBC AUTOMATED: CPT

## 2018-12-17 PROCEDURE — 700102 HCHG RX REV CODE 250 W/ 637 OVERRIDE(OP): Performed by: INTERNAL MEDICINE

## 2018-12-17 RX ORDER — SODIUM CHLORIDE 9 MG/ML
INJECTION, SOLUTION INTRAVENOUS CONTINUOUS
Status: DISPENSED | OUTPATIENT
Start: 2018-12-17 | End: 2018-12-17

## 2018-12-17 RX ORDER — LIDOCAINE HYDROCHLORIDE 20 MG/ML
INJECTION, SOLUTION INFILTRATION; PERINEURAL
Status: COMPLETED
Start: 2018-12-17 | End: 2018-12-17

## 2018-12-17 RX ORDER — MIDAZOLAM HYDROCHLORIDE 1 MG/ML
INJECTION INTRAMUSCULAR; INTRAVENOUS
Status: COMPLETED
Start: 2018-12-17 | End: 2018-12-17

## 2018-12-17 RX ORDER — VERAPAMIL HYDROCHLORIDE 2.5 MG/ML
INJECTION, SOLUTION INTRAVENOUS
Status: COMPLETED
Start: 2018-12-17 | End: 2018-12-17

## 2018-12-17 RX ORDER — HEPARIN SODIUM,PORCINE 1000/ML
VIAL (ML) INJECTION
Status: COMPLETED
Start: 2018-12-17 | End: 2018-12-17

## 2018-12-17 RX ADMIN — NYSTATIN: 100000 POWDER TOPICAL at 17:45

## 2018-12-17 RX ADMIN — SODIUM CHLORIDE TAB 1 GM 1 G: 1 TAB at 09:03

## 2018-12-17 RX ADMIN — NYSTATIN: 100000 POWDER TOPICAL at 09:08

## 2018-12-17 RX ADMIN — IOHEXOL 71 ML: 350 INJECTION, SOLUTION INTRAVENOUS at 14:00

## 2018-12-17 RX ADMIN — HEPARIN SODIUM: 200 INJECTION, SOLUTION INTRAVENOUS at 12:45

## 2018-12-17 RX ADMIN — SODIUM CHLORIDE: 9 INJECTION, SOLUTION INTRAVENOUS at 17:49

## 2018-12-17 RX ADMIN — PYRIDOSTIGMINE BROMIDE 90 MG: 60 TABLET ORAL at 15:37

## 2018-12-17 RX ADMIN — LIDOCAINE HYDROCHLORIDE: 20 INJECTION, SOLUTION INFILTRATION; PERINEURAL at 13:50

## 2018-12-17 RX ADMIN — RIVAROXABAN 20 MG: 20 TABLET, FILM COATED ORAL at 17:45

## 2018-12-17 RX ADMIN — VERAPAMIL HYDROCHLORIDE 2.5 MG: 2.5 INJECTION, SOLUTION INTRAVENOUS at 13:50

## 2018-12-17 RX ADMIN — FENTANYL CITRATE 100 MCG: 50 INJECTION, SOLUTION INTRAMUSCULAR; INTRAVENOUS at 14:13

## 2018-12-17 RX ADMIN — TRIAMCINOLONE ACETONIDE: 5 CREAM TOPICAL at 17:45

## 2018-12-17 RX ADMIN — SODIUM CHLORIDE TAB 1 GM 1 G: 1 TAB at 17:44

## 2018-12-17 RX ADMIN — AMIODARONE HYDROCHLORIDE 50 MG: 200 TABLET ORAL at 09:07

## 2018-12-17 RX ADMIN — HYDRALAZINE HYDROCHLORIDE 10 MG: 20 INJECTION INTRAMUSCULAR; INTRAVENOUS at 16:09

## 2018-12-17 RX ADMIN — MIDODRINE HYDROCHLORIDE 10 MG: 5 TABLET ORAL at 15:37

## 2018-12-17 RX ADMIN — MIDAZOLAM HYDROCHLORIDE 2 MG: 1 INJECTION, SOLUTION INTRAMUSCULAR; INTRAVENOUS at 13:50

## 2018-12-17 RX ADMIN — FENTANYL CITRATE 100 MCG: 50 INJECTION, SOLUTION INTRAMUSCULAR; INTRAVENOUS at 13:50

## 2018-12-17 RX ADMIN — PRAVASTATIN SODIUM 20 MG: 20 TABLET ORAL at 17:45

## 2018-12-17 RX ADMIN — PYRIDOSTIGMINE BROMIDE 90 MG: 60 TABLET ORAL at 09:05

## 2018-12-17 RX ADMIN — FLUDROCORTISONE ACETATE 0.3 MG: 0.1 TABLET ORAL at 09:03

## 2018-12-17 RX ADMIN — CLONAZEPAM 0.25 MG: 0.5 TABLET ORAL at 09:00

## 2018-12-17 RX ADMIN — HEPARIN SODIUM: 1000 INJECTION, SOLUTION INTRAVENOUS; SUBCUTANEOUS at 13:50

## 2018-12-17 RX ADMIN — FLUOXETINE HYDROCHLORIDE 20 MG: 20 CAPSULE ORAL at 09:04

## 2018-12-17 RX ADMIN — NITROGLYCERIN 10 ML: 20 INJECTION INTRAVENOUS at 13:49

## 2018-12-17 RX ADMIN — MIDODRINE HYDROCHLORIDE 10 MG: 5 TABLET ORAL at 09:03

## 2018-12-17 ASSESSMENT — PAIN SCALES - GENERAL
PAINLEVEL_OUTOF10: 0
PAINLEVEL_OUTOF10: 3

## 2018-12-17 ASSESSMENT — LIFESTYLE VARIABLES: SUBSTANCE_ABUSE: 0

## 2018-12-17 ASSESSMENT — ENCOUNTER SYMPTOMS
COUGH: 0
NAUSEA: 0
HALLUCINATIONS: 0
ABDOMINAL PAIN: 0
BLURRED VISION: 0
TREMORS: 0
SHORTNESS OF BREATH: 0
FEVER: 0
CHEST TIGHTNESS: 0
HEMOPTYSIS: 0
WEAKNESS: 1
BLOOD IN STOOL: 0
VOMITING: 0
TINGLING: 0
DIZZINESS: 1
NERVOUS/ANXIOUS: 1
PALPITATIONS: 0
FALLS: 1
DOUBLE VISION: 0
FOCAL WEAKNESS: 0
CHILLS: 0
DEPRESSION: 1
LOSS OF CONSCIOUSNESS: 1
MYALGIAS: 0

## 2018-12-17 ASSESSMENT — GAIT ASSESSMENTS
GAIT LEVEL OF ASSIST: CONTACT GUARD ASSIST
DEVIATION: SHUFFLED GAIT
ASSISTIVE DEVICE: FRONT WHEEL WALKER
DISTANCE (FEET): 100

## 2018-12-17 ASSESSMENT — COGNITIVE AND FUNCTIONAL STATUS - GENERAL
WALKING IN HOSPITAL ROOM: A LITTLE
SUGGESTED CMS G CODE MODIFIER MOBILITY: CK
MOVING TO AND FROM BED TO CHAIR: A LITTLE
MOBILITY SCORE: 18
TURNING FROM BACK TO SIDE WHILE IN FLAT BAD: A LITTLE
CLIMB 3 TO 5 STEPS WITH RAILING: A LITTLE
STANDING UP FROM CHAIR USING ARMS: A LITTLE
MOVING FROM LYING ON BACK TO SITTING ON SIDE OF FLAT BED: A LITTLE

## 2018-12-17 NOTE — DISCHARGE PLANNING
Patient LACE Score = 86  Previous admissions: CHF, LE edema x2, GLF x2, Chest Pain, cellulitis, Paroxismal Afib x2, A-fib with RVR x2, syncope     Patient admitted for CHF exacerbation with concomitant positive orthostatics.    From previous planning note:  Pt has missed previous appointments in the past and semi noncompliance with medications, assess barriers to attending appointments and medication regimen. PT/OT rec home health, pt has had home health in the past and feels home health would benefit him at discharge will need repeat eval for discharge.   12/4: Pseudoseizure event with non-responsiveness, neurology re-consulted. Pt states he feels lightheaded when ambulating and is concerned when walking any distance.      12/15: Pt transferred back to [facility] for continued orthostatic hypotension and associated tachycardia. Tilt table test planned for 12/16 and eval for possible pacemaker placement.     Pt would like to get home health and a four wheel walker as recommended per PT/OT. RN will discuss with QUY[facility] Castellano team for referral.        Follow Up:  Cardiology appt scheduled for 12/20/18  New Heart Failure appt scheduled for 12/24/18  PCP appt scheduled fro 12/28/18      Barriers to med and appointments  - HH to be set up; patient takes bus to appointments  Pharmacy verification   -(previous note 12/4/18) pharmacy updated to [facility] off [address]

## 2018-12-17 NOTE — OP REPORT
Cardiac catheterization report    Procedure date: 12/17/2018    Referring physician: Dr. Tolentino    Pre-operative Diagnosis:  Dyspnea on exertion unclear etiology    Post-operative Diagnosis:   1. Normal right and left heart catheterization with left ventriculography and respiratory variation  2. No significant coronary disease    Procedure:   1. Right and left heart catheterization with respiratory variation and left ventriculography  2.  Selective coronary angiography  3.  Supervision of moderate conscious sedation    Complications: None    Description of Procedure:  After inform consent was obtained, the patient was brought to cardiac catheterization laboratory in fasting state. Harpreet test was carried out on the right hand and was found to be negative.  Right upper chest, right side of his neck, right wrist wrist and right groin were then prepped and drapped in sterile fashion. Versed and fentanyl were used for conscious sedation.  Lidocaine 2% was used to anesthetize the area.  A 6 Citizen of [place] Terumo sheath was then placed in the right radial artery using Seldinger technique.  A 2.5 mg of verapamil and 100 microgram of nitroglycerine were administered into the radial sheath.  His blood pressure was elevated above 190 mmHg systolic, additional 90 mcg of nitroglycerin was administered.  He received Xarelto about 5 PM last night therefore no heparin was given into the sheath.  Next using portable ultrasound of right internal jugular vein was identified.  After local anesthetic was administered, a 6 Citizen of [place] sheath was placed using modified Seldinger technique    Selective angiography of the right coronary artery was performed in multiple views using 5 Citizen of [place] TIG catheter.   Selective coronary angiography of the left coronary artery was then performed using the same catheter.   The catheter was then exchanged to a pigtail catheter.  Left ventriculography was performed using 30 cc of contrast injected over 3 seconds  using pigtail catheter.  Next a 6 [place] catheter was then advanced under fluoroscopic guidance into the right heart and subsequently into with position simultaneous wedge pressure and left ventricular end-diastolic pressure were recorded.  The balloon was deflated and the [place] was pulled back into the pulmonary artery.  Pulmonary artery pressure was recorded.  The [place] catheter was then pulled back to the right ventricle.  Next simultaneous right and left ventricular pressures were recorded during inspiration and expiration.  The [place] catheter was subsequently pulled back to the right atrium and then removed.  Left heart pullback was then performed.The pigtail catheter was then removed.  Radial sheath and right internal jugular venous sheath were subsequently removed.   Hemostasis was obtained using TR wrist band for the wrist and manual compression for the venous sheath.  The patient tolerated procedure well and left cardiac catheterization laboratory in stable condition.    I supervised monitoring the patient under moderate conscious sedation beginning at 1:44 PM until the end of the case at 2:27 PM.    Findings:    Mean pulmonary artery capillary wedge pressure was 10 mmHg.    Left ventricular and aortic pressure was 8-9 mmHg.  Pulmonary artery pressure was 28/14 with a mean of 21 mmHg.  Right ventricular pressure was 23 mmHg systolic RV end-diastolic pressure was 4 mmHg.  Mean right atrial pressure was 3 mmHg.  There is no gradient across aortic or pulmonic valve.  Simultaneous right and left ventricle pressure with respiration showed normal pattern.    Left ventriculography showed normal wall motion.  Overall LV systolic function is normal .  Ejection fraction is calculated to be 62 %.    No significant coronary artery disease    This is left dominant system.    Left main is large and without flow limiting disease.  It bifurcated into left anterior descending and left circumflex  artery.    Left anterior descending artery is large caliber vessel and extends to the apex.  It gives rises to one medium size and several small diagonal branches.  There is no signficant disease in the left anterior descending artery or its major branches.  The antegrade flow is normal.    Left circumflex artery (LCX) is large caliber.   It gives rise to multiple medium size obtuse marginal branches and a medium to large posterior descending artery. There is no significant disease in the LCX system.  The antegrade flow is normal.    Right coronary artery (RCA) is small to medium size caliber. It gives rise to several acute marginal branches.There is no significant disease in the right coronary artery.  The antegrade flow is normal.      Plan:   Limited right wrist movement for 24 hours.  Continue weight reduction.  Resume anticoagulation indicated.        Rocio Ross M.D.

## 2018-12-17 NOTE — PROGRESS NOTES
Bedside report received. POC discussed with pt; all questions answered at this time.  Pt denies pain.

## 2018-12-17 NOTE — PROGRESS NOTES
This nurse spoke to supervisor and NAM about nursing considerations to do with holding medications prior to tilt table tests. Called Airam in cath lab also who was unsure also. The RN in charge of running this test will be in later this morning. Held all medication until clarified.

## 2018-12-17 NOTE — OR SURGEON
Immediate Post-Operative Note      PreOp Diagnosis: Dyspnea, unclear etiology    PostOp Diagnosis: Normal right and left heart cath, no CAD    Procedure(s) :  Coronary Angiography, Right and Left Heart Catheterization, Left Ventriculography    Surgeon(s):  Rocio Ross M.D.    Type of Anesthesia: Moderate Sedation    Specimen: None    Estimated Blood Loss: 10 cc's    Findings: As above    Complications: none      Rocio Ross M.D.  12/17/2018 2:25 PM

## 2018-12-17 NOTE — PROGRESS NOTES
[facility] Medicine Daily Progress Note    Date of Service  12/16/2018    Chief Complaint  53 y.o. male admitted 11/21/2018 with shortness of breath and leg swelling in addition to 40 pounds of weight over 2 weeks    Hospital Course  Presented with complaints of leg swelling, and 40 pound weight gain with bilateral leg edema, increased swelling consistent with exacerbation of congestive heart failure patient was diuresed but then had decreased sodium level and severe orthostasis and postural hypotension has continued to be an issue.    ECHO performed 11/22:  Prior echo done 07/09/18, EF now normal.  Quality improved with contrast.  Left ventricular ejection fraction is visually estimated to be 60%.  Mild concentric left ventricular hypertrophy.  Mild mitral annular calcifcation, mild MR.  Mild tricuspid regurgitation.  Estimated right ventricular systolic pressure  is 30 mmHg.  Plan for tilt table test on Monday, 12/17/2018 per cardiology.    Interval Problem Update  Sitting up in bed comfortably.  Still feels lightheaded when standing up from seated position.  Denies any chest pain or shortness of breath.  No acute distress noted.    Consultants/Specialty  Cards  Neurology  Psychiatry  Code Status  Full    Disposition  To be determined    Review of Systems  Review of Systems   Constitutional: Negative for chills, fever and weight loss.   HENT: Negative for hearing loss and tinnitus.    Eyes: Negative for blurred vision, double vision and photophobia.   Respiratory: Negative for cough, hemoptysis and shortness of breath.    Cardiovascular: Positive for leg swelling. Negative for chest pain and palpitations.   Gastrointestinal: Negative for abdominal pain, nausea and vomiting.   Genitourinary: Negative for urgency.   Musculoskeletal: Positive for falls. Negative for joint pain and myalgias.   Skin: Negative for rash.   Neurological: Positive for dizziness (Orthostatic, upon standing), loss of consciousness and weakness  (minimal change). Negative for tingling, tremors and focal weakness.   Psychiatric/Behavioral: Positive for depression. Negative for hallucinations, substance abuse and suicidal ideas. The patient is nervous/anxious.         Physical Exam  Temp:  [36.1 °C (97 °F)-37.3 °C (99.1 °F)] 36.7 °C (98 °F)  Pulse:  [61-75] 61  Resp:  [16-18] 16  BP: ()/(63-83) 81/63    Physical Exam   Constitutional: He is oriented to person, place, and time. No distress.   HENT:   Head: Normocephalic and atraumatic.   Mouth/Throat: No oropharyngeal exudate.   Eyes: Pupils are equal, round, and reactive to light. Conjunctivae are normal. No scleral icterus.   Neck: Normal range of motion. No tracheal deviation present.   Cardiovascular: Normal rate and regular rhythm.  Exam reveals no gallop.    No murmur heard.  Pulmonary/Chest: Effort normal. No stridor. No respiratory distress. He has no wheezes.   Abdominal: Soft. He exhibits no distension. There is no tenderness.   Musculoskeletal: Normal range of motion. He exhibits edema (compression stockings in place). He exhibits no tenderness or deformity.        Neurological: He is alert and oriented to person, place, and time. He has normal strength. No sensory deficit.   Skin: Skin is warm and dry. He is not diaphoretic.   Chronic stasis dermatitis on legs bilaterally.   Psychiatric: His affect is blunt. He is slowed and withdrawn. He expresses impulsivity.   Nursing note and vitals reviewed.      Fluids    Intake/Output Summary (Last 24 hours) at 12/16/18 1621  Last data filed at 12/16/18 0455   Gross per 24 hour   Intake                0 ml   Output              800 ml   Net             -800 ml       Laboratory  Recent Labs      12/15/18   0022   WBC  8.9   RBC  5.79   HEMOGLOBIN  16.0   HEMATOCRIT  49.6   MCV  85.7   MCH  27.6   MCHC  32.3*   RDW  40.8   PLATELETCT  185   MPV  11.1     Recent Labs      12/15/18   0022  12/16/18   0248   SODIUM  134*  136   POTASSIUM  4.0  4.1    CHLORIDE  101  103   CO2  26  25   GLUCOSE  72  89   BUN  30*  23*   CREATININE  0.97  0.76   CALCIUM  9.8  9.7                   Imaging  CT-HEAD W/O   Final Result      No acute intracranial abnormality is identified.      Paranasal sinus disease.      CT-HEAD W/O   Final Result      No acute intracranial abnormality is identified.      Paranasal sinus disease.      EC-ECHOCARDIOGRAM COMPLETE W/ CONT   Final Result      EC-ECHOCARDIOGRAM COMPLETE W/O CONT         DX-CHEST-PORTABLE (1 VIEW)   Final Result      1.  Hypoinflation without other evidence for acute cardiopulmonary disease.   2.  Stable cardiomegaly.           Assessment/Plan  * Orthostatic hypotension- (present on admission)   Assessment & Plan    Suspect autonomic dysfunction secondary to his diabetes.  No significant electrolyte abnormalities to suggest other etiologies.  - neurology evaluated, greatly appreciate (signed off)  - continue with midodrine and mestinon  - continue florinef at max dose of 0.3 mg daily (monitor K+ closely)  - possibly start on droxidopa outpatient with PCP, per neuro recs  - lifestyle modification such as increased water intake, abdominal or lower extremity compression (minimal improvement), head of bed to at least 30 degrees  -continue compression stockings and abdominal binder, get up slowly  Pyridostigmine dose increased from 60 mg to 90 mg on 12/13  Pt started on clonazepam on 12/12 by psychiatry but this does not seem to have changed patient's BP in any range  Cardiology re consulted 12/15: plan to restart amiodarone, monitor on tele, plan for tilt table test on 12/17, after discussion with patient will resume salt tabs (were stopped previously d/t worsening lower extremity edema)  12/16: Plan for tilt table test on Monday and possibly considering pacemaker.     Psychogenic nonepileptic seizure- (present on admission)   Assessment & Plan    Discussed with neurology again, does not appear to be orthostatic tremors as  he's not orthostatic during the episodes today. No evidence that they are epileptic in nature.  EEG from 12/5 negative for epileptiform discharges during his episodes. NO ATIVAN  - neurology evaluated (signed off)  - s/p EEG on 11/26 and 12/5 negative  - psych following, greatly appreciate  - Started on Prozac and given community resources for PTSD  - monitor clinically     Acute on chronic diastolic heart failure (HCC)- (present on admission)   Assessment & Plan    ECHO with EF 60%. Given diastolic dysfunction and A fib, likely an element of lost atrial kick.  Still in sinus rhythm.  - cardiology evaluated.  - s/p 11/26 DCCV for atrial fibrillation  - still orthostatic despite cardioversion and return to sinus rhythm  Continue with low-dose amiodarone as blood pressure allows.       Syncope- (present on admission)   Assessment & Plan    Remains orthostatic, probable autonomic dysfunction  - management as above       Paroxysmal A-fib (HCC)- (present on admission)   Assessment & Plan    Rate controlled, s/p electrical cardioversion. Now sinus rhythm  Continue low-dose amiodarone as blood pressure allows.  Continue Xarelto.     Essential hypertension- (present on admission)   Assessment & Plan    BP has been relatively stable but continues to be orthostatic with standing.  Will accept a higher baseline blood pressure in order to avoid the low standing blood pressures  - Not currenty on antihypertensives, highest 160's  - was previously taking metop and lisinopril     Mixed hyperlipidemia- (present on admission)   Assessment & Plan    - Low-fat low-cholesterol diet.    - Continue with statin.     GERD (gastroesophageal reflux disease)- (present on admission)   Assessment & Plan    Stable   -controlled on PPI     Type 2 diabetes mellitus, without long-term current use of insulin, with peripheral neuropathy (HCC)- (present on admission)   Assessment & Plan    A1C 6.3%, without hyperglycemia. Holding home metformin.  -  Sugars are at goal and has not required correctional insulin     Plan of care discussed with multidisciplinary team during rounds.    VTE prophylaxis: xarelto

## 2018-12-18 VITALS
OXYGEN SATURATION: 99 % | TEMPERATURE: 97.7 F | HEIGHT: 72 IN | DIASTOLIC BLOOD PRESSURE: 65 MMHG | WEIGHT: 230.38 LBS | SYSTOLIC BLOOD PRESSURE: 102 MMHG | RESPIRATION RATE: 17 BRPM | BODY MASS INDEX: 31.2 KG/M2 | HEART RATE: 70 BPM

## 2018-12-18 LAB
ANION GAP SERPL CALC-SCNC: 5 MMOL/L (ref 0–11.9)
BUN SERPL-MCNC: 24 MG/DL (ref 8–22)
CALCIUM SERPL-MCNC: 8.9 MG/DL (ref 8.5–10.5)
CHLORIDE SERPL-SCNC: 103 MMOL/L (ref 96–112)
CO2 SERPL-SCNC: 26 MMOL/L (ref 20–33)
CREAT SERPL-MCNC: 0.69 MG/DL (ref 0.5–1.4)
EKG IMPRESSION: NORMAL
GLUCOSE SERPL-MCNC: 90 MG/DL (ref 65–99)
POTASSIUM SERPL-SCNC: 3.8 MMOL/L (ref 3.6–5.5)
SODIUM SERPL-SCNC: 134 MMOL/L (ref 135–145)

## 2018-12-18 PROCEDURE — A9270 NON-COVERED ITEM OR SERVICE: HCPCS | Performed by: HOSPITALIST

## 2018-12-18 PROCEDURE — A9270 NON-COVERED ITEM OR SERVICE: HCPCS | Performed by: PSYCHIATRY & NEUROLOGY

## 2018-12-18 PROCEDURE — 90833 PSYTX W PT W E/M 30 MIN: CPT | Performed by: PSYCHIATRY & NEUROLOGY

## 2018-12-18 PROCEDURE — 700102 HCHG RX REV CODE 250 W/ 637 OVERRIDE(OP): Performed by: INTERNAL MEDICINE

## 2018-12-18 PROCEDURE — 700102 HCHG RX REV CODE 250 W/ 637 OVERRIDE(OP): Performed by: HOSPITALIST

## 2018-12-18 PROCEDURE — 99232 SBSQ HOSP IP/OBS MODERATE 35: CPT | Performed by: PSYCHIATRY & NEUROLOGY

## 2018-12-18 PROCEDURE — 80048 BASIC METABOLIC PNL TOTAL CA: CPT

## 2018-12-18 PROCEDURE — 99232 SBSQ HOSP IP/OBS MODERATE 35: CPT | Performed by: INTERNAL MEDICINE

## 2018-12-18 PROCEDURE — 700102 HCHG RX REV CODE 250 W/ 637 OVERRIDE(OP): Performed by: PSYCHIATRY & NEUROLOGY

## 2018-12-18 PROCEDURE — A9270 NON-COVERED ITEM OR SERVICE: HCPCS | Performed by: INTERNAL MEDICINE

## 2018-12-18 PROCEDURE — A9270 NON-COVERED ITEM OR SERVICE: HCPCS | Performed by: NURSE PRACTITIONER

## 2018-12-18 PROCEDURE — 36415 COLL VENOUS BLD VENIPUNCTURE: CPT

## 2018-12-18 PROCEDURE — 99239 HOSP IP/OBS DSCHRG MGMT >30: CPT | Performed by: INTERNAL MEDICINE

## 2018-12-18 PROCEDURE — 97535 SELF CARE MNGMENT TRAINING: CPT

## 2018-12-18 PROCEDURE — 700102 HCHG RX REV CODE 250 W/ 637 OVERRIDE(OP): Performed by: NURSE PRACTITIONER

## 2018-12-18 RX ORDER — SODIUM CHLORIDE 1 G/1
1 TABLET ORAL
Qty: 90 TAB | Refills: 3 | Status: ON HOLD | OUTPATIENT
Start: 2018-12-18 | End: 2019-02-27

## 2018-12-18 RX ORDER — MIDODRINE HYDROCHLORIDE 10 MG/1
10 TABLET ORAL 3 TIMES DAILY
Qty: 60 TAB | Refills: 0 | Status: ON HOLD | OUTPATIENT
Start: 2018-12-18 | End: 2019-01-01

## 2018-12-18 RX ORDER — PYRIDOSTIGMINE BROMIDE 60 MG/1
90 TABLET ORAL 3 TIMES DAILY
Qty: 90 TAB | Refills: 3 | Status: ON HOLD | OUTPATIENT
Start: 2018-12-18 | End: 2019-01-02

## 2018-12-18 RX ORDER — AMIODARONE HYDROCHLORIDE 100 MG/1
50 TABLET ORAL DAILY
Qty: 30 TAB | Refills: 0 | Status: ON HOLD | OUTPATIENT
Start: 2018-12-19 | End: 2019-02-27

## 2018-12-18 RX ORDER — FLUOXETINE HYDROCHLORIDE 20 MG/1
20 CAPSULE ORAL DAILY
Qty: 30 CAP | Refills: 0 | Status: ON HOLD | OUTPATIENT
Start: 2018-12-19 | End: 2019-02-27

## 2018-12-18 RX ORDER — FLUDROCORTISONE ACETATE 0.1 MG/1
0.3 TABLET ORAL EVERY MORNING
Qty: 30 TAB | Refills: 0 | Status: ON HOLD | OUTPATIENT
Start: 2018-12-19 | End: 2019-02-27

## 2018-12-18 RX ORDER — FUROSEMIDE 20 MG/1
20 TABLET ORAL PRN
Qty: 60 TAB | Refills: 0 | Status: ON HOLD | OUTPATIENT
Start: 2018-12-18 | End: 2019-02-27

## 2018-12-18 RX ADMIN — AMIODARONE HYDROCHLORIDE 50 MG: 200 TABLET ORAL at 06:03

## 2018-12-18 RX ADMIN — SODIUM CHLORIDE TAB 1 GM 1 G: 1 TAB at 16:57

## 2018-12-18 RX ADMIN — RIVAROXABAN 20 MG: 20 TABLET, FILM COATED ORAL at 16:57

## 2018-12-18 RX ADMIN — NYSTATIN: 100000 POWDER TOPICAL at 16:58

## 2018-12-18 RX ADMIN — MIDODRINE HYDROCHLORIDE 10 MG: 5 TABLET ORAL at 06:03

## 2018-12-18 RX ADMIN — PYRIDOSTIGMINE BROMIDE 90 MG: 60 TABLET ORAL at 06:11

## 2018-12-18 RX ADMIN — FLUOXETINE HYDROCHLORIDE 20 MG: 20 CAPSULE ORAL at 06:03

## 2018-12-18 RX ADMIN — TRIAMCINOLONE ACETONIDE: 5 CREAM TOPICAL at 16:57

## 2018-12-18 RX ADMIN — MIDODRINE HYDROCHLORIDE 10 MG: 5 TABLET ORAL at 16:57

## 2018-12-18 RX ADMIN — FLUDROCORTISONE ACETATE 0.3 MG: 0.1 TABLET ORAL at 06:04

## 2018-12-18 RX ADMIN — SODIUM CHLORIDE TAB 1 GM 1 G: 1 TAB at 12:04

## 2018-12-18 RX ADMIN — MIDODRINE HYDROCHLORIDE 10 MG: 5 TABLET ORAL at 12:04

## 2018-12-18 RX ADMIN — PYRIDOSTIGMINE BROMIDE 90 MG: 60 TABLET ORAL at 12:04

## 2018-12-18 RX ADMIN — PRAVASTATIN SODIUM 20 MG: 20 TABLET ORAL at 16:57

## 2018-12-18 RX ADMIN — TRIAMCINOLONE ACETONIDE: 5 CREAM TOPICAL at 06:09

## 2018-12-18 RX ADMIN — PYRIDOSTIGMINE BROMIDE 90 MG: 60 TABLET ORAL at 16:58

## 2018-12-18 RX ADMIN — CLONAZEPAM 0.25 MG: 0.5 TABLET ORAL at 06:06

## 2018-12-18 RX ADMIN — NYSTATIN: 100000 POWDER TOPICAL at 06:08

## 2018-12-18 RX ADMIN — SODIUM CHLORIDE TAB 1 GM 1 G: 1 TAB at 06:08

## 2018-12-18 RX ADMIN — NYSTATIN: 100000 POWDER TOPICAL at 12:04

## 2018-12-18 ASSESSMENT — COGNITIVE AND FUNCTIONAL STATUS - GENERAL
HELP NEEDED FOR BATHING: A LITTLE
PERSONAL GROOMING: A LITTLE
DRESSING REGULAR LOWER BODY CLOTHING: A LITTLE
SUGGESTED CMS G CODE MODIFIER DAILY ACTIVITY: CJ
DAILY ACTIVITIY SCORE: 20
TOILETING: A LITTLE

## 2018-12-18 ASSESSMENT — ENCOUNTER SYMPTOMS
ABDOMINAL PAIN: 0
NERVOUS/ANXIOUS: 1
PALPITATIONS: 0
BLOOD IN STOOL: 0
FEVER: 0
CHEST TIGHTNESS: 0
DIZZINESS: 1
SHORTNESS OF BREATH: 0

## 2018-12-18 ASSESSMENT — PAIN SCALES - GENERAL
PAINLEVEL_OUTOF10: 0

## 2018-12-18 NOTE — PROGRESS NOTES
Cardiology Follow Up Progress Note    Date of Service  12/18/2018    Attending Physician  Galileo Rodriguez M.D.    Chief Complaint   Leg Swelling     Consulted for acute on chronic diastolic heart failure with persistent and severe orthostatic hypotension.      MARGUERITE Castro is a 53 y.o. male admitted 11/21/2018 with shortness of breath and bilateral lower leg swelling.  Patient reported 40 pound weight gain in 2 weeks.     Past medical history significant for atrial fibrillation with rate related cardiomyopathy and prior history of depressed LV systolic function.  Other past medical history significant for obesity, hypertension, hyperlipidemia, DM2 and, psychogenic nonepileptic seizure and chronic anticoagulation with Xarelto.     Interim Events  12/17/18: Patient resting in chair at bedside anxious to know what the plan is. Denies chest pain or shortness of breath. Does have some dizziness with prolonged periods of standing.     12/18/18: Resting in chair at bedside.  Patient frustrated that he was told his angiogram is normal, yet still having significant orthostatic hypotension.  No significant events overnight per nursing.    Monitor: SB/SR 58-81 with first degree AV block.    Labs:  Na 134. K 3.8, Bun 24, Cr 0.69, GFR >60    Review of Systems  Review of Systems   Constitutional: Negative for fever.   Respiratory: Negative for chest tightness and shortness of breath.    Cardiovascular: Positive for leg swelling. Negative for chest pain and palpitations.   Gastrointestinal: Negative for abdominal pain and blood in stool.   Genitourinary: Negative for hematuria.   Musculoskeletal: Negative for gait problem.   Neurological: Positive for dizziness. Negative for syncope.   Psychiatric/Behavioral: The patient is nervous/anxious.    All other systems reviewed and are negative.      Vital signs in last 24 hours  Temp:  [36.2 °C (97.1 °F)-36.7 °C (98 °F)] 36.3 °C (97.3 °F)  Pulse:  [59-79] 68  Resp:  [16-18] 18  BP:  ()/(53-98) 121/78    Physical Exam  Physical Exam   Constitutional: He is oriented to person, place, and time. He appears well-developed and well-nourished.   Eyes: EOM are normal.   Neck: No JVD present.   Cardiovascular: Normal rate, regular rhythm, normal heart sounds and intact distal pulses.    Pulmonary/Chest: Effort normal and breath sounds normal.   Musculoskeletal: Normal range of motion. He exhibits edema.   Neurological: He is alert and oriented to person, place, and time.   Skin: Skin is warm and dry.   Chronic venous stasis skin discoloration noted on bilateral lower extremities.    Right radial cath site is uncomplicated without swelling, erythema or discharge.   Psychiatric: His speech is normal and behavior is normal. Judgment and thought content normal. His mood appears anxious. Cognition and memory are normal.   Nursing note and vitals reviewed.      Lab Review  Lab Results   Component Value Date/Time    WBC 7.2 12/17/2018 10:33 AM    RBC 5.82 12/17/2018 10:33 AM    HEMOGLOBIN 16.5 12/17/2018 10:33 AM    HEMATOCRIT 49.4 12/17/2018 10:33 AM    MCV 84.9 12/17/2018 10:33 AM    MCH 28.4 12/17/2018 10:33 AM    MCHC 33.4 (L) 12/17/2018 10:33 AM    MPV 11.2 12/17/2018 10:33 AM      Lab Results   Component Value Date/Time    SODIUM 134 (L) 12/18/2018 12:34 AM    POTASSIUM 3.8 12/18/2018 12:34 AM    CHLORIDE 103 12/18/2018 12:34 AM    CO2 26 12/18/2018 12:34 AM    GLUCOSE 90 12/18/2018 12:34 AM    BUN 24 (H) 12/18/2018 12:34 AM    CREATININE 0.69 12/18/2018 12:34 AM      Lab Results   Component Value Date/Time    ASTSGOT 22 12/04/2018 10:28 AM    ALTSGPT 27 12/04/2018 10:28 AM     Lab Results   Component Value Date/Time    CHOLSTRLTOT 104 11/22/2018 12:07 AM    LDL 60 11/22/2018 12:07 AM    HDL 27 (A) 11/22/2018 12:07 AM    TRIGLYCERIDE 87 11/22/2018 12:07 AM    TROPONINI <0.01 12/04/2018 10:28 AM                Cardiac Imaging and Procedures Review  Echocardiogram 7/9/18:  Moderately reduced left  ventricular systolic function.  Mild concentric left ventricular hypertrophy.  Aortic sclerosis without stenosis.  Estimated right ventricular systolic pressure  is 30 mmHg.  Compared to the images of the study done on 01/27/2018 there has been   reduction in left ventricular ejection fraction.     Echocardiogram 11/2/18:  CONCLUSIONS  Prior echo done 07/09/18, EF now normal.  Quality improved with contrast.  Left ventricular ejection fraction is visually estimated to be 60%.  Mild concentric left ventricular hypertrophy.  Mild mitral annular calcifcation, mild MR.  Mild tricuspid regurgitation.  Estimated right ventricular systolic pressure  is 30 mmHg.     Left Ventricle  Contrast was used to enhance visualization of the endocardial border. 3ML of contrast was administered. Existing IV was used. Normal left ventricular chamber size. Mild concentric left ventricular hypertrophy. Normal left ventricular systolic function. Left ventricular ejection fraction is visually estimated to be 60%. Normal regional wall motion. Indeterminate diastolic function.    Right and left heart catheterization 12/17/18:    Pre-operative Diagnosis:    Dyspnea on exertion unclear etiology       Post-operative Diagnosis:     1. Normal right and left heart catheterization with left ventriculography and respiratory variation    2. No significant coronary disease       Procedure:    1. Right and left heart catheterization with respiratory variation and left ventriculography    2.  Selective coronary angiography    3.  Supervision of moderate conscious sedation      Imaging     Stress Test 1/29/18:  No evidence of significant jeopardized viable myocardium or prior myocardial infarction.  Normal left ventricular size, ejection fraction, and wall motion.     Assessment/Plan  HFpEF:  -LVEF per echo on 11/22/18 was 60%.   -Continue Lasix 20 mg prn edema or SOB.   -R/LHC normal on 12/17/18.     Atrial Fibrillation:  -S/P DCCV on  11/26/18.  -Maintaining NSR.  -Continue low dose Amiodarone 50 mg daily due to orthostatic hypotension.      Orthostatic Hypotension:  -Significant and symptomatic.   -Continue Florinef 0.3 mg daily, Midodrine 10 mg TID and Mestinon 90 mg TID.     Hyponatremia:  -Na 134 this am.   -Continue sodium chloride 1 g TID.      HLD:  -Stable.  -LDL on 11/22/18 was 60 with .  -Continue Pravastatin 20 mg daily.      Thank you for allowing us to participate in the care of this patient.  We will sign off at this time with close follow-up scheduled as below.  Please let us know if you have any questions or concerns.      Future Appointments  Date Time Provider Department Center   12/20/2018 9:15 AM [facility] EXAM 5 [facility] None   12/24/2018 12:15 PM Sirena Larson M.D. [facility] None   12/28/2018 3:30 PM German Hinton M.D. [facility] None            ELIZABETH Llanos   [facility]  [phone]

## 2018-12-18 NOTE — FACE TO FACE
Face to Face Supporting Documentation - Home Health    The encounter with this patient was in whole or in part the primary reason for home health admission.    Date of encounter:   Patient:                    MRN:                       YOB: 2020  Sebastián Csatro  [id number]  1965     Home health to see patient for:  Physical Therapy evaluation and treatment and Occupational therapy evaluation and treatment    Homebound status evidenced by:  Need the aid of supportive devices such as crutches, canes, wheelchairs or walkers. Leaving home requires a considerable and taxing effort. There is a normal inability to leave the home.    Community Physician to provide follow up care: German Hinton M.D.     Optional Interventions? No      I certify the face to face encounter for this home health care referral meets the CMS requirements and the encounter/clinical assessment with the patient was, in whole, or in part, for the medical condition(s) listed above, which is the primary reason for home health care. Based on my clinical findings: the service(s) are medically necessary, support the need for home health care, and the homebound criteria are met.  I certify that this patient has had a face to face encounter by myself   Galileo Rodriguez M.D. - NPI: [id number]

## 2018-12-18 NOTE — THERAPY
"Physical Therapy Treatment completed.   Bed Mobility:  Supine to Sit: Supervised  Transfers: Sit to Stand: Stand by Assist  Gait: Level Of Assist: Contact Guard Assist (One LOB w/ ModA to correct) with Front-Wheel Walker       Plan of Care: Will benefit from Physical Therapy 3 times per week  Discharge Recommendations: Equipment: 4-Wheel Walker.     See \"Rehab Therapy-Acute\" Patient Summary Report for complete documentation.     Pt continues to be limited by his orthostatic BP. Pt has the strength to perform all mobility but is limited w/ endurance d/t \"seeing stars\" when his BP dropped. Today pt felt symptomatic however his BP got to 121/83 at the lowest. Pt did have one major LOB in which he required ModA to correct but is was mainly because he attempted to walk w/out his FWW and was symptomatic. Pt will benefit from a 4WW so he has a place to sit quickly when he becomes symptomatic.  "

## 2018-12-18 NOTE — FACE TO FACE
Face to Face Note  -  Durable Medical Equipment    Galileo Rodriguez M.D. - NPI: [id number]  I certify that this patient is under my care and that they had a durable medical equipment(DME)face to face encounter by myself that meets the physician DME face-to-face encounter requirements with this patient on:    Date of encounter:   Patient:                    MRN:                       YOB: 2020  Sebastián Castro  [id number]  1965     The encounter with the patient was in whole, or in part, for the following medical condition, which is the primary reason for durable medical equipment:  Other - physical decondition    I certify that, based on my findings, the following durable medical equipment is medically necessary:  Walkers.    HOME O2 Saturation Measurements:(Values must be present for Home Oxygen orders)         ,     ,         My Clinical findings support the need for the above equipment due to:  Abnormal Gait

## 2018-12-18 NOTE — THERAPY
"Occupational Therapy Treatment completed with focus on ADLs, ADL transfers and patient education.  Functional Status:  SPV LB dressing, CGA functional mobility w/ out FWW, SBA w/ FWW  Plan of Care: Decreasing frequency to 1x/wk   Discharge Recommendations:  Equipment 4WW Post-acute therapy Recommend home health or outpatient transitional care services for continued occupational therapy services    See \"Rehab Therapy-Acute\" Patient Summary Report for complete documentation.     Provided pt with education regarding home safety regarding showering and grooming. Pt reports that his girlfriend got him a shower chair and verbalized understanding of water temp in relation to blood pressure. Provided pt w/ resources to obtain DME as needed. Will continue to follow for Acute OT services while in house.   "

## 2018-12-18 NOTE — DISCHARGE PLANNING
"Anticipated Discharge Disposition: D/C to Home with HH    Action: LSW informed by RN that pt is concerned about getting into his house as his girlfriend has their house key. LSW met with pt at bedside to discuss potential solutions to pt getting his key, such as assistance getting a taxi from [facility] to [place] (where pt's girlfriend works), then from [place] to his house. Pt stated he would call his manager and see if he could be let in and after multiple seconds of \"calling\" someone, pt stated he can be let in and it isn't a problem. Pt reported he will take the bus and feels safe doing so, he also confirmed he has a bus ticket and money to get home.    Barriers to Discharge: None.    Plan: No further d/c planning needs at this time.     "

## 2018-12-18 NOTE — PSYCHIATRY
PSYCHIATRIC FOLLOW UP:      Reason for consult: functional seizures   Requesting Physician:Leoncio    HPI:       Sebastián Castro is a 53 y.o. year old male with a PMH of CHF, a fib, DM, HTN,  Amputation, with depressed mood, PTSD, and some functional seizures. Was started on prozac on 12/6 and then on klonopin on 12/12. Had a cardiac cath yesterday.  Has had some dizziness with prolonged standing, some orthostatic hypotension. They suspect autonomic dysfunction secondary to DM. He is on midodrine and mestinon, florinef, using compression stockings. Klonopin noted not to have changed BP in any range by hospitalist. He is doing very well. He is anxious about his BP and still being here. He reports he is sleeping better. He reports reduction in reexperiencing symtpoms. He would like to leave when he can. He is relived at results of cath. He is using abdominal binder, compression hose. Discussed plan to increase his valued activities when he leaves here.       Psychiatric Examination: observed phenomenon:  Vitals:   Vitals:    12/17/18 2200 12/18/18 0034 12/18/18 0435 12/18/18 0801   BP: 115/64 118/60 121/78 118/67   Pulse: 61 (!) 59 68 64   Resp: 18 18 18 18   Temp: 36.3 °C (97.3 °F) 36.2 °C (97.2 °F) 36.3 °C (97.3 °F) 36.9 °C (98.5 °F)   TempSrc: Temporal Temporal Temporal Temporal   SpO2: 96% 97% 95% 96%   Weight:       Height:         Musculoskeletal  No psychomotor agitation or retardation   Appearance:Grooming wnl .  swellnig in legs.   Thoughts Process: Logical and sequential, goal-directed   Thought Content: No a/vh, no evidence of delusions, no ideas of reference, no internal stimulation noted   Speech:Nl tone, rate, and volume. Not pressured. Understandable.   Mood:     Anxious   Affect:    constricted  SI/HI:   Denies   Attention/Alertness:   Awake, alert, wnl   Memory:    Grossly intact.   Orientation:    Grossly intact.   Cognition:Grossly intact.   Insight:good   Judgement: good    Neurological  Testing:    Medical systems reviewed:   Eyes: denies issues   Skin:deneis issues   CV:no cp, no palpitations currently.   Lungs:no sob   Neuro: weakness. Pain.   GI:no n/v   : no dysuria   Endocrine: dizziness.   Psych: see hpi.   Musculoskeletal: abnormal gait, falls easily.   All other systems reviewed and are negative.       Soc history:    Declines SNF as he would like to return home with his s/o.     Lab results/tests:   Recent Results (from the past 48 hour(s))   CBC without Differential    Collection Time: 12/17/18 10:33 AM   Result Value Ref Range    WBC 7.2 4.8 - 10.8 K/uL    RBC 5.82 4.70 - 6.10 M/uL    Hemoglobin 16.5 14.0 - 18.0 g/dL    Hematocrit 49.4 42.0 - 52.0 %    MCV 84.9 81.4 - 97.8 fL    MCH 28.4 27.0 - 33.0 pg    MCHC 33.4 (L) 33.7 - 35.3 g/dL    RDW 40.8 35.9 - 50.0 fL    Platelet Count 192 164 - 446 K/uL    MPV 11.2 9.0 - 12.9 fL   INR (Prothrombin/ INR)    Collection Time: 12/17/18 10:33 AM   Result Value Ref Range    PT 16.5 (H) 12.0 - 14.6 sec    INR 1.32 (H) 0.87 - 1.13   APTT    Collection Time: 12/17/18 10:33 AM   Result Value Ref Range    APTT 34.6 24.7 - 36.0 sec   Basic Metabolic Panel (BMP) Tomorrow AM (LAB)    Collection Time: 12/18/18 12:34 AM   Result Value Ref Range    Sodium 134 (L) 135 - 145 mmol/L    Potassium 3.8 3.6 - 5.5 mmol/L    Chloride 103 96 - 112 mmol/L    Co2 26 20 - 33 mmol/L    Glucose 90 65 - 99 mg/dL    Bun 24 (H) 8 - 22 mg/dL    Creatinine 0.69 0.50 - 1.40 mg/dL    Calcium 8.9 8.5 - 10.5 mg/dL    Anion Gap 5.0 0.0 - 11.9   ESTIMATED GFR    Collection Time: 12/18/18 12:34 AM   Result Value Ref Range    GFR If African American >60 >60 mL/min/1.73 m 2    GFR If Non African American >60 >60 mL/min/1.73 m 2       Assessment   Adjutsment disorder with depressed mood  PTSD  Functional Neurologic Symptom diosrder    Plan:    Continue prozac  Continue klonopin     I personally spent over 30 minutes doing psychotherapy with this patient.  Pt participated in session  focused on increasing psychological flexibility.     Psych signing off, thank you for letting us participate in the care of this patient.

## 2018-12-18 NOTE — PROGRESS NOTES
Bedside report received from previous nurse regarding prior 12 hours.  Pt A&Ox4.  Denies pain.  White board updated.  Call light within reach.

## 2018-12-18 NOTE — DISCHARGE PLANNING
Anticipated Discharge Disposition: D/C to Home with HH    Action: LSW informed by MD during IDT rounds that pt is medically clear to d/c home if HH is still able to accept him. LSW requested new orders and face-to-face for HH. MD placed new orders, LSW contacted [facility] and confirmed that pt is still accepted. HH made aware that pt will d/c today.    Barriers to Discharge: None.    Plan: LSW to assist pt in arranging transport once d/c orders have been entered.

## 2018-12-18 NOTE — PROGRESS NOTES
[facility] Medicine Daily Progress Note    Date of Service  12/17/2018    Chief Complaint  53 y.o. male admitted 11/21/2018 with shortness of breath and leg swelling in addition to 40 pounds of weight over 2 weeks    Hospital Course  Presented with complaints of leg swelling, and 40 pound weight gain with bilateral leg edema, increased swelling consistent with exacerbation of congestive heart failure patient was diuresed but then had decreased sodium level and severe orthostasis and postural hypotension has continued to be an issue.    ECHO performed 11/22:  Prior echo done 07/09/18, EF now normal.  Quality improved with contrast.  Left ventricular ejection fraction is visually estimated to be 60%.  Mild concentric left ventricular hypertrophy.  Mild mitral annular calcifcation, mild MR.  Mild tricuspid regurgitation.  Estimated right ventricular systolic pressure  is 30 mmHg.  Plan for tilt table test on Monday, 12/17/2018 per cardiology.  Cardiology recommended to perform cardiac cath.    Interval Problem Update  Sitting up in bed comfortably.  Still feels lightheaded when standing up from seated position.  Denies any chest pain or shortness of breath.  No acute distress noted.    Consultants/Specialty  Cards  Neurology  Psychiatry  Code Status  Full    Disposition  To be determined    Review of Systems  Review of Systems   Constitutional: Negative for chills and fever.   HENT: Negative for ear pain, hearing loss and tinnitus.    Eyes: Negative for blurred vision and double vision.   Respiratory: Negative for cough, hemoptysis and shortness of breath.    Cardiovascular: Positive for leg swelling. Negative for chest pain and palpitations.   Gastrointestinal: Negative for abdominal pain, nausea and vomiting.   Genitourinary: Negative for urgency.   Musculoskeletal: Positive for falls. Negative for joint pain and myalgias.   Skin: Negative for itching and rash.   Neurological: Positive for dizziness (Orthostatic, upon  standing), loss of consciousness and weakness (minimal change). Negative for tingling, tremors and focal weakness.   Psychiatric/Behavioral: Positive for depression. Negative for hallucinations, substance abuse and suicidal ideas. The patient is nervous/anxious.         Physical Exam  Temp:  [36.1 °C (96.9 °F)-36.7 °C (98 °F)] 36.4 °C (97.6 °F)  Pulse:  [57-79] 63  Resp:  [16-18] 18  BP: (131-180)/(75-98) 156/81    Physical Exam   Constitutional: He is oriented to person, place, and time. No distress.   HENT:   Head: Normocephalic and atraumatic.   Mouth/Throat: No oropharyngeal exudate.   Eyes: Pupils are equal, round, and reactive to light. Conjunctivae are normal. No scleral icterus.   Neck: Normal range of motion. No tracheal deviation present.   Cardiovascular: Normal rate and regular rhythm.  Exam reveals no gallop.    No murmur heard.  Pulmonary/Chest: Effort normal. No stridor. No respiratory distress. He has no wheezes.   Abdominal: Soft. He exhibits no distension. There is no tenderness.   Musculoskeletal: Normal range of motion. He exhibits edema (compression stockings in place). He exhibits no tenderness or deformity.        Neurological: He is alert and oriented to person, place, and time. He has normal strength. No sensory deficit.   Skin: Skin is warm and dry. He is not diaphoretic.   Chronic stasis dermatitis on legs bilaterally.   Psychiatric: His affect is blunt. He is slowed and withdrawn. He expresses impulsivity.   Nursing note and vitals reviewed.      Fluids    Intake/Output Summary (Last 24 hours) at 12/17/18 1902  Last data filed at 12/17/18 1800   Gross per 24 hour   Intake              240 ml   Output             1725 ml   Net            -1485 ml       Laboratory  Recent Labs      12/15/18   0022  12/17/18   1033   WBC  8.9  7.2   RBC  5.79  5.82   HEMOGLOBIN  16.0  16.5   HEMATOCRIT  49.6  49.4   MCV  85.7  84.9   MCH  27.6  28.4   MCHC  32.3*  33.4*   RDW  40.8  40.8   PLATELETCT  185   192   MPV  11.1  11.2     Recent Labs      12/15/18   0022  12/16/18   0248   SODIUM  134*  136   POTASSIUM  4.0  4.1   CHLORIDE  101  103   CO2  26  25   GLUCOSE  72  89   BUN  30*  23*   CREATININE  0.97  0.76   CALCIUM  9.8  9.7     Recent Labs      12/17/18   1033   APTT  34.6   INR  1.32*               Imaging  CT-HEAD W/O   Final Result      No acute intracranial abnormality is identified.      Paranasal sinus disease.      CT-HEAD W/O   Final Result      No acute intracranial abnormality is identified.      Paranasal sinus disease.      EC-ECHOCARDIOGRAM COMPLETE W/ CONT   Final Result      EC-ECHOCARDIOGRAM COMPLETE W/O CONT         DX-CHEST-PORTABLE (1 VIEW)   Final Result      1.  Hypoinflation without other evidence for acute cardiopulmonary disease.   2.  Stable cardiomegaly.           Assessment/Plan  * Orthostatic hypotension- (present on admission)   Assessment & Plan    Suspect autonomic dysfunction secondary to his diabetes.  No significant electrolyte abnormalities to suggest other etiologies.  - neurology evaluated, greatly appreciate (signed off)  - continue with midodrine and mestinon  - continue florinef at max dose of 0.3 mg daily (monitor K+ closely)  - possibly start on droxidopa outpatient with PCP, per neuro recs  - lifestyle modification such as increased water intake, abdominal or lower extremity compression (minimal improvement), head of bed to at least 30 degrees  -continue compression stockings and abdominal binder, get up slowly  Pyridostigmine dose increased from 60 mg to 90 mg on 12/13  Pt started on clonazepam on 12/12 by psychiatry but this does not seem to have changed patient's BP in any range  Cardiology re consulted 12/15: plan to restart amiodarone, monitor on tele, plan for tilt table test on 12/17, after discussion with patient will resume salt tabs (were stopped previously d/t worsening lower extremity edema)  12/16: Plan for tilt table test on Monday and possibly  considering pacemaker.  12/17: Cardiology decided to take the patient to cardiac cath to rule out constrictive pericarditis.     Psychogenic nonepileptic seizure- (present on admission)   Assessment & Plan    Discussed with neurology again, does not appear to be orthostatic tremors as he's not orthostatic during the episodes today. No evidence that they are epileptic in nature.  EEG from 12/5 negative for epileptiform discharges during his episodes. NO ATIVAN  - neurology evaluated (signed off)  - s/p EEG on 11/26 and 12/5 negative  - psych following, greatly appreciate  - Started on Prozac and given community resources for PTSD  - monitor clinically     Acute on chronic diastolic heart failure (HCC)- (present on admission)   Assessment & Plan    ECHO with EF 60%. Given diastolic dysfunction and A fib, likely an element of lost atrial kick.  Still in sinus rhythm.  - cardiology evaluated.  - s/p 11/26 DCCV for atrial fibrillation  - still orthostatic despite cardioversion and return to sinus rhythm  Continue with low-dose amiodarone as blood pressure allows.       Syncope- (present on admission)   Assessment & Plan    Remains orthostatic, probable autonomic dysfunction  - management as above       Paroxysmal A-fib (HCC)- (present on admission)   Assessment & Plan    Rate controlled, s/p electrical cardioversion. Now sinus rhythm  Continue low-dose amiodarone as blood pressure allows.  Continue Xarelto.     Essential hypertension- (present on admission)   Assessment & Plan    BP has been relatively stable but continues to be orthostatic with standing.  Will accept a higher baseline blood pressure in order to avoid the low standing blood pressures  - Not currenty on antihypertensives, highest 160's  - was previously taking metop and lisinopril     Mixed hyperlipidemia- (present on admission)   Assessment & Plan    - Low-fat low-cholesterol diet.    - Continue with statin.     GERD (gastroesophageal reflux disease)-  (present on admission)   Assessment & Plan    Stable   -controlled on PPI     Type 2 diabetes mellitus, without long-term current use of insulin, with peripheral neuropathy (HCC)- (present on admission)   Assessment & Plan    A1C 6.3%, without hyperglycemia. Holding home metformin.  - Sugars are at goal and has not required correctional insulin     Plan of care discussed with multidisciplinary team during rounds.    VTE prophylaxis: xarelto

## 2018-12-19 NOTE — PROGRESS NOTES
Pt dc'd hme. IV and monitor removed; monitor room notified. Pt left unit via wheelchair with escort. Personal belongings with pt when leaving unit. Pt given discharge instructions prior to leaving unit including prescription and when to visit with physician; verbalizes understanding. Copy of discharge instructions with pt and in the chart.

## 2018-12-19 NOTE — DISCHARGE INSTRUCTIONS
Discharge Instructions    Discharged to home by car with self. Discharged via wheelchair, hospital escort: Yes.  Special equipment needed: Walker    Be sure to schedule a follow-up appointment with your primary care doctor or any specialists as instructed.     Discharge Plan:   Diet Plan: Discussed  Activity Level: Discussed  Confirmed Follow up Appointment: Appointment Scheduled  Confirmed Symptoms Management: Discussed  Medication Reconciliation Updated: Yes  Influenza Vaccine Indication: Not indicated: Previously immunized this influenza season and > 8 years of age    I understand that a diet low in cholesterol, fat, and sodium is recommended for good health. Unless I have been given specific instructions below for another diet, I accept this instruction as my diet prescription.   Other diet:     Special Instructions:   [facility] Patient Discharge Instructions  · Monitor your weight daily, and maintain a weight chart, to track your weight changes.   · Activity as tolerated, unless your Doctor has ordered otherwise. Other activity order.  · Follow a low fat, low cholesterol, low salt diet unless instructed otherwise by your Doctor. Read the labels on the back of food products and track your intake of fat, cholesterol and salt.   · Fluid Restriction No. If a Fluid Restriction has been ordered by your Doctor, measure fluids with a measuring cup to ensure that you are not exceeding the restriction.   · No smoking.  · Oxygen No. If your Doctor has ordered that you wear Oxygen at home, it is important to wear it as ordered.  · Did you receive an explanation from staff on the importance of taking each of your medications and why it is necessary to keep taking them unless your doctor says to stop? Yes  · Were all of your questions answered about how to manage your heart failure and what to do if you have increased signs and symptoms after you go home? Yes  · Do you feel like your heart failure care team involved you in the care  treatment plan and allowed you to make decisions regarding your care while in the hospital and addressed any discharge needs you might have? Yes    See the educational handout provided at discharge for more information on monitoring your daily weight, activity and diet. This also explains more about Heart Failure, symptoms of a flare-up and some of the tests that you have undergone.     Warning Signs of a Flare-Up include:  · Swelling in the ankles or lower legs.  · Shortness of breath, while at rest, or while doing normal activities.   · Shortness of breath at night when in bed, or coughing in bed.   · Requiring more pillows to sleep at night, or needing to sit up at night to sleep.  · Feeling weak, dizzy or fatigued.     When to call your Doctor:  · Call [facility] seven days a week from 8:00 a.m. to 8:00 p.m. for medical questions [phone].  · Call your Primary Care Physician or Cardiologist if:   1. You experience any pain radiating to your jaw or neck.  2. You have any difficulty breathing.  3. You experience weight gain of 3 lbs in a day or 5 lbs in a week.   4. You feel any palpitations or irregular heartbeats.  5. You become dizzy or lose consciousness.   If you have had an angiogram or had a pacemaker or AICD placed, and experience:  1. Bleeding, drainage or swelling at the surgical / puncture site.  2. Fever greater than 100.0 F  3. Shock from internal defibrillator.  4. Cool and / or numb extremities.      · Is patient discharged on Warfarin / Coumadin?   No     Depression / Suicide Risk    As you are discharged from this [facility], it is important to learn how to keep safe from harming yourself.    Recognize the warning signs:  · Abrupt changes in personality, positive or negative- including increase in energy   · Giving away possessions  · Change in eating patterns- significant weight changes-  positive or negative  · Change in sleeping patterns- unable to sleep or sleeping  all the time   · Unwillingness or inability to communicate  · Depression  · Unusual sadness, discouragement and loneliness  · Talk of wanting to die  · Neglect of personal appearance   · Rebelliousness- reckless behavior  · Withdrawal from people/activities they love  · Confusion- inability to concentrate     If you or a loved one observes any of these behaviors or has concerns about self-harm, here's what you can do:  · Talk about it- your feelings and reasons for harming yourself  · Remove any means that you might use to hurt yourself (examples: pills, rope, extension cords, firearm)  · Get professional help from the community (Mental Health, Substance Abuse, psychological counseling)  · Do not be alone:Call your Safe Contact- someone whom you trust who will be there for you.  · Call your local CRISIS HOTLINE 594-8852 or 961-084-9618  · Call your local Children's Mobile Crisis Response Team [place] (498) 363-4457 or www.[facility]  · Call the toll free National Suicide Prevention Hotlines   · National Suicide Prevention Lifeline 952-364-SMFJ (5946)  · [facility] 800-SUICIDE (500-2199)      Heart Failure  Heart failure means your heart has trouble pumping blood. This makes it hard for your body to work well. Heart failure is usually a long-term (chronic) condition. You must take good care of yourself and follow your doctor's treatment plan.  HOME CARE  · Take your heart medicine as told by your doctor.  ¨ Do not stop taking medicine unless your doctor tells you to.  ¨ Do not skip any dose of medicine.  ¨ Refill your medicines before they run out.  ¨ Take other medicines only as told by your doctor or pharmacist.  · Stay active if told by your doctor. The elderly and people with severe heart failure should talk with a doctor about physical activity.  · Eat heart-healthy foods. Choose foods that are without trans fat and are low in saturated fat, cholesterol, and salt (sodium). This  includes fresh or frozen fruits and vegetables, fish, lean meats, fat-free or low-fat dairy foods, whole grains, and high-fiber foods. Lentils and dried peas and beans (legumes) are also good choices.  · Limit salt if told by your doctor.  · Cook in a healthy way. Roast, grill, broil, bake, poach, steam, or stir-hanson foods.  · Limit fluids as told by your doctor.  · Weigh yourself every morning. Do this after you pee (urinate) and before you eat breakfast. Write down your weight to give to your doctor.  · Take your blood pressure and write it down if your doctor tells you to.  · Ask your doctor how to check your pulse. Check your pulse as told.  · Lose weight if told by your doctor.  · Stop smoking or chewing tobacco. Do not use gum or patches that help you quit without your doctor's approval.  · Schedule and go to doctor visits as told.  · Nonpregnant women should have no more than 1 drink a day. Men should have no more than 2 drinks a day. Talk to your doctor about drinking alcohol.  · Stop illegal drug use.  · Stay current with shots (immunizations).  · Manage your health conditions as told by your doctor.  · Learn to manage your stress.  · Rest when you are tired.  · If it is really hot outside:  ¨ Avoid intense activities.  ¨ Use air conditioning or fans, or get in a cooler place.  ¨ Avoid caffeine and alcohol.  ¨ Wear loose-fitting, lightweight, and light-colored clothing.  · If it is really cold outside:  ¨ Avoid intense activities.  ¨ Layer your clothing.  ¨ Wear mittens or gloves, a hat, and a scarf when going outside.  ¨ Avoid alcohol.  · Learn about heart failure and get support as needed.  · Get help to maintain or improve your quality of life and your ability to care for yourself as needed.  GET HELP IF:   · You gain weight quickly.  · You are more short of breath than usual.  · You cannot do your normal activities.  · You tire easily.  · You cough more than normal, especially with activity.  · You have  any or more puffiness (swelling) in areas such as your hands, feet, ankles, or belly (abdomen).  · You cannot sleep because it is hard to breathe.  · You feel like your heart is beating fast (palpitations).  · You get dizzy or light-headed when you stand up.  GET HELP RIGHT AWAY IF:   · You have trouble breathing.  · There is a change in mental status, such as becoming less alert or not being able to focus.  · You have chest pain or discomfort.  · You faint.  MAKE SURE YOU:   · Understand these instructions.  · Will watch your condition.  · Will get help right away if you are not doing well or get worse.  This information is not intended to replace advice given to you by your health care provider. Make sure you discuss any questions you have with your health care provider.  Document Released: 09/26/2009 Document Revised: 01/08/2016 Document Reviewed: 02/03/2014  [email or web address] Interactive Patient Education © 2017 [email or web address]      Atrial Fibrillation  Introduction  Atrial fibrillation is a type of heartbeat that is irregular or fast (rapid). If you have this condition, your heart keeps quivering in a weird (chaotic) way. This condition can make it so your heart cannot pump blood normally. Having this condition gives a person more risk for stroke, heart failure, and other heart problems. There are different types of atrial fibrillation. Talk with your doctor to learn about the type that you have.  Follow these instructions at home:  · Take over-the-counter and prescription medicines only as told by your doctor.  · If your doctor prescribed a blood-thinning medicine, take it exactly as told. Taking too much of it can cause bleeding. If you do not take enough of it, you will not have the protection that you need against stroke and other problems.  · Do not use any tobacco products. These include cigarettes, chewing tobacco, and e-cigarettes. If you need help quitting, ask your doctor.  · If you have apnea (obstructive sleep  apnea), manage it as told by your doctor.  · Do not drink alcohol.  · Do not drink beverages that have caffeine. These include coffee, soda, and tea.  · Maintain a healthy weight. Do not use diet pills unless your doctor says they are safe for you. Diet pills may make heart problems worse.  · Follow diet instructions as told by your doctor.  · Exercise regularly as told by your doctor.  · Keep all follow-up visits as told by your doctor. This is important.  Contact a doctor if:  · You notice a change in the speed, rhythm, or strength of your heartbeat.  · You are taking a blood-thinning medicine and you notice more bruising.  · You get tired more easily when you move or exercise.  Get help right away if:  · You have pain in your chest or your belly (abdomen).  · You have sweating or weakness.  · You feel sick to your stomach (nauseous).  · You notice blood in your throw up (vomit), poop (stool), or pee (urine).  · You are short of breath.  · You suddenly have swollen feet and ankles.  · You feel dizzy.  · Your suddenly get weak or numb in your face, arms, or legs, especially if it happens on one side of your body.  · You have trouble talking, trouble understanding, or both.  · Your face or your eyelid droops on one side.  These symptoms may be an emergency. Do not wait to see if the symptoms will go away. Get medical help right away. Call your local emergency services (911 in the [place] Do not drive yourself to the hospital.   This information is not intended to replace advice given to you by your health care provider. Make sure you discuss any questions you have with your health care provider.  Document Released: 09/26/2009 Document Revised: 05/25/2017 Document Reviewed: 04/13/2016  © 2017 [employer]      Orthostatic Hypotension  Orthostatic hypotension is a sudden drop in blood pressure that happens when you quickly change positions, such as when you get up from a seated or lying position. Blood pressure is a  measurement of how strongly, or weakly, your blood is pressing against the walls of your arteries. Arteries are blood vessels that carry blood from your heart throughout your body. When blood pressure is too low, you may not get enough blood to your brain or to the rest of your organs. This can cause weakness, light-headedness, rapid heartbeat, and fainting. This can last for just a few seconds or for up to a few minutes.  Orthostatic hypotension is usually not a serious problem. However, if it happens frequently or gets worse, it may be a sign of something more serious.  What are the causes?  This condition may be caused by:  · Sudden changes in posture, such as standing up quickly after you have been sitting or lying down.  · Blood loss.  · Loss of body fluids (dehydration).  · Heart problems.  · Hormone (endocrine) problems.  · Pregnancy.  · Severe infection.  · Lack of certain nutrients.  · Severe allergic reactions (anaphylaxis).  · Certain medicines, such as blood pressure medicine or medicines that make the body lose excess fluids (diuretics). Sometimes, this condition can be caused by not taking medicine as directed, such as taking too much of a certain medicine.  What increases the risk?  Certain factors can make you more likely to develop orthostatic hypotension, including:  · Age. Risk increases as you get older.  · Conditions that affect the heart or the central nervous system.  · Taking certain medicines, such as blood pressure medicine or diuretics.  · Being pregnant.  What are the signs or symptoms?  Symptoms of this condition may include:  · Weakness.  · Light-headedness.  · Dizziness.  · Blurred vision.  · Fatigue.  · Rapid heartbeat.  · Fainting, in severe cases.  How is this diagnosed?  This condition is diagnosed based on:  · Your medical history.  · Your symptoms.  · Your blood pressure measurement. Your health care provider will check your blood pressure when you are:  ¨ Lying  "down.  ¨ Sitting.  ¨ Standing.  A blood pressure reading is recorded as two numbers, such as \"120 over 80\" (or 120/80). The first (\"top\") number is called the systolic pressure. It is a measure of the pressure in your arteries as your heart beats. The second (\"bottom\") number is called the diastolic pressure. It is a measure of the pressure in your arteries when your heart relaxes between beats. Blood pressure is measured in a unit called mm Hg. Healthy blood pressure for adults is 120/80. If your blood pressure is below 90/60, you may be diagnosed with hypotension.  Other information or tests that may be used to diagnose orthostatic hypotension include:  · Your other vital signs, such as your heart rate and temperature.  · Blood tests.  · Tilt table test. For this test, you will be safely secured to a table that moves you from a lying position to an upright position. Your heart rhythm and blood pressure will be monitored during the test.  How is this treated?  Treatment for this condition may include:  · Changing your diet. This may involve eating more salt (sodium) or drinking more water.  · Taking medicines to raise your blood pressure.  · Changing the dosage of certain medicines you are taking that might be lowering your blood pressure.  · Wearing compression stockings. These stockings help to prevent blood clots and reduce swelling in your legs.  In some cases, you may need to go to the hospital for:  · Fluid replacement. This means you will receive fluids through an IV tube.  · Blood replacement. This means you will receive donated blood through an IV tube (transfusion).  · Treating an infection or heart problems, if this applies.  · Monitoring. You may need to be monitored while medicines that you are taking wear off.  Follow these instructions at home:  Eating and drinking  · Drink enough fluid to keep your urine clear or pale yellow.  · Eat a healthy diet and follow instructions from your health care " provider about eating or drinking restrictions. A healthy diet includes:  ¨ Fresh fruits and vegetables.  ¨ Whole grains.  ¨ Lean meats.  ¨ Low-fat dairy products.  · Eat extra salt only as directed. Do not add extra salt to your diet unless your health care provider told you to do that.  · Eat frequent, small meals.  · Avoid standing up suddenly after eating.  Medicines  · Take over-the-counter and prescription medicines only as told by your health care provider.  ¨ Follow instructions from your health care provider about changing the dosage of your current medicines, if this applies.  ¨ Do not stop or adjust any of your medicines on your own.  General instructions  · Wear compression stockings as told by your health care provider.  · Get up slowly from lying down or sitting positions. This gives your blood pressure a chance to adjust.  · Avoid hot showers and excessive heat as directed by your health care provider.  · Return to your normal activities as told by your health care provider. Ask your health care provider what activities are safe for you.  · Do not use any products that contain nicotine or tobacco, such as cigarettes and e-cigarettes. If you need help quitting, ask your health care provider.  · Keep all follow-up visits as told by your health care provider. This is important.  Contact a health care provider if:  · You vomit.  · You have diarrhea.  · You have a fever for more than 2-3 days.  · You feel more thirsty than usual.  · You feel weak and tired.  Get help right away if:  · You have chest pain.  · You have a fast or irregular heartbeat.  · You develop numbness in any part of your body.  · You cannot move your arms or your legs.  · You have trouble speaking.  · You become sweaty or feel lightheaded.  · You faint.  · You feel short of breath.  · You have trouble staying awake.  · You feel confused.  This information is not intended to replace advice given to you by your health care provider. Make  sure you discuss any questions you have with your health care provider.  Document Released: 12/08/2003 Document Revised: 09/05/2017 Document Reviewed: 06/09/2017  [employer] Interactive Patient Education © 2017 [employer]    Hypertension  Hypertension is another name for high blood pressure. High blood pressure forces your heart to work harder to pump blood. A blood pressure reading has two numbers, which includes a higher number over a lower number (example: 110/72).  Follow these instructions at home:  · Have your blood pressure rechecked by your doctor.  · Only take medicine as told by your doctor. Follow the directions carefully. The medicine does not work as well if you skip doses. Skipping doses also puts you at risk for problems.  · Do not smoke.  · Monitor your blood pressure at home as told by your doctor.  Contact a doctor if:  · You think you are having a reaction to the medicine you are taking.  · You have repeat headaches or feel dizzy.  · You have puffiness (swelling) in your ankles.  · You have trouble with your vision.  Get help right away if:  · You get a very bad headache and are confused.  · You feel weak, numb, or faint.  · You get chest or belly (abdominal) pain.  · You throw up (vomit).  · You cannot breathe very well.  This information is not intended to replace advice given to you by your health care provider. Make sure you discuss any questions you have with your health care provider.  Document Released: 06/05/2009 Document Revised: 05/25/2017 Document Reviewed: 10/10/2014  [employer] Interactive Patient Education © 2017 [employer]

## 2018-12-21 ENCOUNTER — TELEPHONE (OUTPATIENT)
Dept: VASCULAR LAB | Facility: MEDICAL CENTER | Age: 53
End: 2018-12-21

## 2018-12-21 NOTE — TELEPHONE ENCOUNTER
Attempted to reach patient regarding his anticoagulation referral. Called him on his primary contact number- no answer and no voicemail set up. Also called secondary number on file and it was disconnected. Will try to reach patient again at a later time.

## 2018-12-22 ENCOUNTER — APPOINTMENT (OUTPATIENT)
Dept: RADIOLOGY | Facility: MEDICAL CENTER | Age: 53
End: 2018-12-22
Attending: EMERGENCY MEDICINE
Payer: MEDICAID

## 2018-12-22 ENCOUNTER — HOSPITAL ENCOUNTER (EMERGENCY)
Facility: MEDICAL CENTER | Age: 53
End: 2018-12-22
Attending: EMERGENCY MEDICINE
Payer: MEDICAID

## 2018-12-22 VITALS
HEART RATE: 78 BPM | TEMPERATURE: 98.1 F | OXYGEN SATURATION: 97 % | BODY MASS INDEX: 29.92 KG/M2 | RESPIRATION RATE: 17 BRPM | WEIGHT: 220.9 LBS | DIASTOLIC BLOOD PRESSURE: 68 MMHG | HEIGHT: 72 IN | SYSTOLIC BLOOD PRESSURE: 150 MMHG

## 2018-12-22 DIAGNOSIS — R07.9 CHEST PAIN, UNSPECIFIED TYPE: ICD-10-CM

## 2018-12-22 LAB
ALBUMIN SERPL BCP-MCNC: 4 G/DL (ref 3.2–4.9)
ALBUMIN/GLOB SERPL: 1.2 G/DL
ALP SERPL-CCNC: 98 U/L (ref 30–99)
ALT SERPL-CCNC: 37 U/L (ref 2–50)
ANION GAP SERPL CALC-SCNC: 8 MMOL/L (ref 0–11.9)
APTT PPP: 30.2 SEC (ref 24.7–36)
AST SERPL-CCNC: 26 U/L (ref 12–45)
BASOPHILS # BLD AUTO: 0.6 % (ref 0–1.8)
BASOPHILS # BLD: 0.04 K/UL (ref 0–0.12)
BILIRUB SERPL-MCNC: 0.5 MG/DL (ref 0.1–1.5)
BNP SERPL-MCNC: 65 PG/ML (ref 0–100)
BUN SERPL-MCNC: 51 MG/DL (ref 8–22)
CALCIUM SERPL-MCNC: 9.6 MG/DL (ref 8.5–10.5)
CHLORIDE SERPL-SCNC: 101 MMOL/L (ref 96–112)
CO2 SERPL-SCNC: 27 MMOL/L (ref 20–33)
CREAT SERPL-MCNC: 0.83 MG/DL (ref 0.5–1.4)
EKG IMPRESSION: NORMAL
EOSINOPHIL # BLD AUTO: 0.3 K/UL (ref 0–0.51)
EOSINOPHIL NFR BLD: 4.3 % (ref 0–6.9)
ERYTHROCYTE [DISTWIDTH] IN BLOOD BY AUTOMATED COUNT: 42.5 FL (ref 35.9–50)
GLOBULIN SER CALC-MCNC: 3.4 G/DL (ref 1.9–3.5)
GLUCOSE SERPL-MCNC: 119 MG/DL (ref 65–99)
HCT VFR BLD AUTO: 43.4 % (ref 42–52)
HGB BLD-MCNC: 14.3 G/DL (ref 14–18)
IMM GRANULOCYTES # BLD AUTO: 0.01 K/UL (ref 0–0.11)
IMM GRANULOCYTES NFR BLD AUTO: 0.1 % (ref 0–0.9)
INR PPP: 1.08 (ref 0.87–1.13)
LIPASE SERPL-CCNC: 19 U/L (ref 11–82)
LYMPHOCYTES # BLD AUTO: 2.38 K/UL (ref 1–4.8)
LYMPHOCYTES NFR BLD: 34 % (ref 22–41)
MCH RBC QN AUTO: 28.2 PG (ref 27–33)
MCHC RBC AUTO-ENTMCNC: 32.9 G/DL (ref 33.7–35.3)
MCV RBC AUTO: 85.6 FL (ref 81.4–97.8)
MONOCYTES # BLD AUTO: 0.61 K/UL (ref 0–0.85)
MONOCYTES NFR BLD AUTO: 8.7 % (ref 0–13.4)
NEUTROPHILS # BLD AUTO: 3.65 K/UL (ref 1.82–7.42)
NEUTROPHILS NFR BLD: 52.3 % (ref 44–72)
NRBC # BLD AUTO: 0 K/UL
NRBC BLD-RTO: 0 /100 WBC
PLATELET # BLD AUTO: 169 K/UL (ref 164–446)
PMV BLD AUTO: 10.9 FL (ref 9–12.9)
POTASSIUM SERPL-SCNC: 4 MMOL/L (ref 3.6–5.5)
PROT SERPL-MCNC: 7.4 G/DL (ref 6–8.2)
PROTHROMBIN TIME: 14.1 SEC (ref 12–14.6)
RBC # BLD AUTO: 5.07 M/UL (ref 4.7–6.1)
SODIUM SERPL-SCNC: 136 MMOL/L (ref 135–145)
TROPONIN I SERPL-MCNC: <0.01 NG/ML (ref 0–0.04)
WBC # BLD AUTO: 7 K/UL (ref 4.8–10.8)

## 2018-12-22 PROCEDURE — 93005 ELECTROCARDIOGRAM TRACING: CPT

## 2018-12-22 PROCEDURE — 85610 PROTHROMBIN TIME: CPT

## 2018-12-22 PROCEDURE — 85025 COMPLETE CBC W/AUTO DIFF WBC: CPT

## 2018-12-22 PROCEDURE — 71045 X-RAY EXAM CHEST 1 VIEW: CPT

## 2018-12-22 PROCEDURE — 36415 COLL VENOUS BLD VENIPUNCTURE: CPT

## 2018-12-22 PROCEDURE — 99284 EMERGENCY DEPT VISIT MOD MDM: CPT

## 2018-12-22 PROCEDURE — 80053 COMPREHEN METABOLIC PANEL: CPT

## 2018-12-22 PROCEDURE — 93005 ELECTROCARDIOGRAM TRACING: CPT | Performed by: EMERGENCY MEDICINE

## 2018-12-22 PROCEDURE — 83880 ASSAY OF NATRIURETIC PEPTIDE: CPT

## 2018-12-22 PROCEDURE — 85730 THROMBOPLASTIN TIME PARTIAL: CPT

## 2018-12-22 PROCEDURE — 84484 ASSAY OF TROPONIN QUANT: CPT

## 2018-12-22 PROCEDURE — 83690 ASSAY OF LIPASE: CPT

## 2018-12-22 ASSESSMENT — LIFESTYLE VARIABLES: DO YOU DRINK ALCOHOL: NO

## 2018-12-22 NOTE — ED TRIAGE NOTES
"Pt BIB EMS for pseudoseizure at the bus stop. Pt reporting chest pain and \"these seizures that come out of nowhere\", also CHF with leg swelling, reporting continuing all his appropriate medications.  "

## 2018-12-22 NOTE — ED NOTES
Pt discharged at this time, verbalized understanding of discharge instructions and follow up at this time. Pt stable on discharge. Pt ambulated out at this time.

## 2018-12-22 NOTE — ED PROVIDER NOTES
ED Provider Note    ED Provider Note    Primary care provider: German Hinton M.D.  Means of arrival: EMS  History obtained from: Patient    CHIEF COMPLAINT  Chief Complaint   Patient presents with   • Chest Pain     Seen at 1:22 PM.   HPI  Sebastián Castro is a 53 y.o. male who presents to the Emergency Department for chest pain.  He states the stabbing chest pain comes and goes intermittently.  The does not have any provoking factors and does not appear to be related to eating.  He notes some shortness of breath and nausea at times.  He denies any vomiting.  He notes chronic lower extremity swelling that is actually better than it was several weeks ago when he was admitted last time.  He is compliant with his medications.  He also notes some intermittent shaking episodes.        REVIEW OF SYSTEMS  See HPI,   Remainder of ROS negative.     PAST MEDICAL HISTORY   has a past medical history of A-fib (HCC); Chest pain; Congestive heart failure (HCC); Diabetes (HCC); Diabetic neuropathy (HCC); Hypercholesterolemia; Hypertension; Morbid obesity (HCC); Noncompliance; and Normal cardiac stress test.    SURGICAL HISTORY   has a past surgical history that includes toe amputation (Right, 11/10/2017); toe amputation (Right, 1/17/2018); irrigation & debridement ortho (Right, 2/19/2018); and cardiac cath (07/21/2018).    SOCIAL HISTORY  Social History   Substance Use Topics   • Smoking status: Never Smoker   • Smokeless tobacco: Never Used   • Alcohol use No      History   Drug Use No       FAMILY HISTORY  Family History   Problem Relation Age of Onset   • No Known Problems Mother    • No Known Problems Father        CURRENT MEDICATIONS  Reviewed.  See Encounter Summary.     ALLERGIES  Allergies   Allergen Reactions   • Daptomycin Rash     Body rash  RXN=1/2018     • Pcn [Penicillins] Hives and Rash      Rash & hives  RXN=since a kid   • Unasyn [Ampicillin-Sulbactam Sodium] Hives, Itching and Swelling   • Vancomycin Rash      Red man syndrome       PHYSICAL EXAM  VITAL SIGNS: /68   Pulse 78   Temp 36.7 °C (98.1 °F) (Temporal)   Resp 17   Ht 1.829 m (6')   Wt 100.2 kg (220 lb 14.4 oz)   SpO2 97%   BMI 29.96 kg/m²   Constitutional: Awake, alert in no apparent distress.  HENT: Normocephalic, Bilateral external ears normal. Nose normal.   Eyes: Conjunctiva normal, non-icteric, EOMI.    Thorax & Lungs: Easy unlabored respirations, Clear to ascultation bilaterally.  Cardiovascular: Regular rate, Regular rhythm, No murmurs, rubs or gallops.  Abdomen:  Soft, nontender, nondistended, normal active bowel sounds.   :    Skin: Visualized skin is  Dry, No erythema, No rash.   Musculoskeletal:   3+ edema bilateral lower extremities.    Neurologic: Alert, Grossly non-focal.   Psychiatric: Normal affect, Normal mood  Lymphatic:  No cervical LAD    EKG   12 lead Interpreted by me  Rhythm:  Normal sinus rhythm   Rate: 80  Axis: normal  Ectopy: none  Conduction: normal  ST Segments: no acute change  T Waves: no acute change  Clinical Impression: Normal EKG without acute changes     RADIOLOGY  DX-CHEST-LIMITED (1 VIEW)   Final Result      No acute cardiopulmonary abnormality identified.            COURSE & MEDICAL DECISION MAKING  Pertinent Labs & Imaging studies reviewed. (See chart for details)        1:22 PM - Medical record reviewed, patient seen and examined at bedside.  The patient has had 3 ultrasounds of the lower extremities in the past year.  He has also had 3 CTA of the chest in the past year.  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  He has had no evidence of pulmonary emboli.  He underwent a angiogram on his hospitalization 2 weeks ago that was negative for coronary artery disease.    1:45 PM-     Heart score: Very low.  Decision Making:  This is a 53 y.o. year old male who presents with intermittent stabbing chest pain.  Troponin is undetectable.  The patient underwent angiogram that  is normal, therefore the patient's likelihood of acute coronary syndrome is extremely low.  EKG is unremarkable and unchanged from prior.  He does have impressive bilateral lower extremity edema that he states is actually better than it has been in the past.  He has had multiple ultrasounds and CTs of the chest that were negative for acute thrombus.  I feel that acute thrombotic event seems extremely unlikely given his presentation today and do not feel that he requires a fourth CTA of the chest this year.  In addition he endorses compliance with his Xarelto.  Remainder of laboratory evaluation is unremarkable, he does not have a leukocytosis or leftward shift, electrolytes are normal.  Albumin is normal, he does not have any hepatic derangement.  At this time the edema is likely venous stasis.  Congestive heart failure is a consideration though the patient does not have any acute decompensation, chest x-ray, respiratory rate and oxygenation are all within normal limits today.  With regards to this seizure-like activity, he did have a brief episode of shaking that resolved spontaneously.  He underwent EEG on his last hospitalization and was seen by neurology.  He was diagnosed with nonepileptic seizures. (Pseudoseizures).         Discharge Medications:  Discharge Medication List as of 12/22/2018  1:52 PM          The patient was discharged home (see d/c instructions) and parent was told to return immediately for any signs or symptoms listed, or any worsening at all.  The patient's parent verbally agreed to the discharge precautions and follow-up plan which is documented in EPIC.    The patient's blood pressure is elevated today. >120/80. I have referred them to primary care for follow up.       FINAL IMPRESSION  1. Chest pain, unspecified type

## 2018-12-28 ENCOUNTER — APPOINTMENT (OUTPATIENT)
Dept: RADIOLOGY | Facility: MEDICAL CENTER | Age: 53
End: 2018-12-28
Attending: EMERGENCY MEDICINE
Payer: MEDICAID

## 2018-12-28 ENCOUNTER — HOSPITAL ENCOUNTER (OUTPATIENT)
Facility: MEDICAL CENTER | Age: 53
End: 2019-02-27
Attending: EMERGENCY MEDICINE | Admitting: HOSPITALIST
Payer: MEDICAID

## 2018-12-28 DIAGNOSIS — I50.32 CHRONIC DIASTOLIC HEART FAILURE (HCC): ICD-10-CM

## 2018-12-28 DIAGNOSIS — R55 SYNCOPE, UNSPECIFIED SYNCOPE TYPE: ICD-10-CM

## 2018-12-28 DIAGNOSIS — I95.1 ORTHOSTATIC SYNCOPE: ICD-10-CM

## 2018-12-28 DIAGNOSIS — R53.81 DEBILITY: ICD-10-CM
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
## 2018-12-28 DIAGNOSIS — S32.010A CLOSED COMPRESSION FRACTURE OF FIRST LUMBAR VERTEBRA, INITIAL ENCOUNTER: ICD-10-CM

## 2018-12-28 DIAGNOSIS — E78.2 MIXED HYPERLIPIDEMIA: ICD-10-CM

## 2018-12-28 DIAGNOSIS — I95.1 ORTHOSTATIC HYPOTENSION: ICD-10-CM

## 2018-12-28 DIAGNOSIS — R42 DIZZINESS: ICD-10-CM

## 2018-12-28 DIAGNOSIS — L03.311 CELLULITIS OF ABDOMINAL WALL: ICD-10-CM
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
## 2018-12-28 LAB
ALBUMIN SERPL BCP-MCNC: 3.3 G/DL (ref 3.2–4.9)
ALBUMIN/GLOB SERPL: 1 G/DL
ALP SERPL-CCNC: 92 U/L (ref 30–99)
ALT SERPL-CCNC: 35 U/L (ref 2–50)
ANION GAP SERPL CALC-SCNC: 9 MMOL/L (ref 0–11.9)
AST SERPL-CCNC: 23 U/L (ref 12–45)
BASOPHILS # BLD AUTO: 0.4 % (ref 0–1.8)
BASOPHILS # BLD: 0.03 K/UL (ref 0–0.12)
BILIRUB SERPL-MCNC: 0.8 MG/DL (ref 0.1–1.5)
BUN SERPL-MCNC: 16 MG/DL (ref 8–22)
CALCIUM SERPL-MCNC: 9.2 MG/DL (ref 8.5–10.5)
CHLORIDE SERPL-SCNC: 102 MMOL/L (ref 96–112)
CO2 SERPL-SCNC: 27 MMOL/L (ref 20–33)
CREAT SERPL-MCNC: 0.57 MG/DL (ref 0.5–1.4)
D DIMER PPP IA.FEU-MCNC: 0.88 UG/ML (FEU) (ref 0–0.5)
EOSINOPHIL # BLD AUTO: 0.31 K/UL (ref 0–0.51)
EOSINOPHIL NFR BLD: 4.3 % (ref 0–6.9)
ERYTHROCYTE [DISTWIDTH] IN BLOOD BY AUTOMATED COUNT: 42.5 FL (ref 35.9–50)
ETHANOL BLD-MCNC: 0 G/DL
GLOBULIN SER CALC-MCNC: 3.2 G/DL (ref 1.9–3.5)
GLUCOSE SERPL-MCNC: 113 MG/DL (ref 65–99)
HCT VFR BLD AUTO: 41.4 % (ref 42–52)
HGB BLD-MCNC: 14 G/DL (ref 14–18)
IMM GRANULOCYTES # BLD AUTO: 0.02 K/UL (ref 0–0.11)
IMM GRANULOCYTES NFR BLD AUTO: 0.3 % (ref 0–0.9)
LACTATE BLD-SCNC: 1 MMOL/L (ref 0.5–2)
LYMPHOCYTES # BLD AUTO: 1.16 K/UL (ref 1–4.8)
LYMPHOCYTES NFR BLD: 16.1 % (ref 22–41)
MCH RBC QN AUTO: 28.2 PG (ref 27–33)
MCHC RBC AUTO-ENTMCNC: 33.8 G/DL (ref 33.7–35.3)
MCV RBC AUTO: 83.5 FL (ref 81.4–97.8)
MONOCYTES # BLD AUTO: 0.68 K/UL (ref 0–0.85)
MONOCYTES NFR BLD AUTO: 9.4 % (ref 0–13.4)
NEUTROPHILS # BLD AUTO: 5 K/UL (ref 1.82–7.42)
NEUTROPHILS NFR BLD: 69.5 % (ref 44–72)
NRBC # BLD AUTO: 0 K/UL
NRBC BLD-RTO: 0 /100 WBC
PLATELET # BLD AUTO: 160 K/UL (ref 164–446)
PMV BLD AUTO: 10.3 FL (ref 9–12.9)
POTASSIUM SERPL-SCNC: 3.6 MMOL/L (ref 3.6–5.5)
PROT SERPL-MCNC: 6.5 G/DL (ref 6–8.2)
RBC # BLD AUTO: 4.96 M/UL (ref 4.7–6.1)
SODIUM SERPL-SCNC: 138 MMOL/L (ref 135–145)
TROPONIN I SERPL-MCNC: <0.01 NG/ML (ref 0–0.04)
WBC # BLD AUTO: 7.2 K/UL (ref 4.8–10.8)

## 2018-12-28 PROCEDURE — 71275 CT ANGIOGRAPHY CHEST: CPT

## 2018-12-28 PROCEDURE — 93005 ELECTROCARDIOGRAM TRACING: CPT | Performed by: EMERGENCY MEDICINE

## 2018-12-28 PROCEDURE — 99285 EMERGENCY DEPT VISIT HI MDM: CPT

## 2018-12-28 PROCEDURE — 82533 TOTAL CORTISOL: CPT

## 2018-12-28 PROCEDURE — 700102 HCHG RX REV CODE 250 W/ 637 OVERRIDE(OP): Performed by: EMERGENCY MEDICINE

## 2018-12-28 PROCEDURE — A9270 NON-COVERED ITEM OR SERVICE: HCPCS | Performed by: EMERGENCY MEDICINE

## 2018-12-28 PROCEDURE — 700117 HCHG RX CONTRAST REV CODE 255: Performed by: EMERGENCY MEDICINE

## 2018-12-28 PROCEDURE — 84484 ASSAY OF TROPONIN QUANT: CPT

## 2018-12-28 PROCEDURE — 85025 COMPLETE CBC W/AUTO DIFF WBC: CPT

## 2018-12-28 PROCEDURE — 80053 COMPREHEN METABOLIC PANEL: CPT

## 2018-12-28 PROCEDURE — 94760 N-INVAS EAR/PLS OXIMETRY 1: CPT

## 2018-12-28 PROCEDURE — 83036 HEMOGLOBIN GLYCOSYLATED A1C: CPT

## 2018-12-28 PROCEDURE — 70450 CT HEAD/BRAIN W/O DYE: CPT

## 2018-12-28 PROCEDURE — 85379 FIBRIN DEGRADATION QUANT: CPT

## 2018-12-28 PROCEDURE — 36415 COLL VENOUS BLD VENIPUNCTURE: CPT

## 2018-12-28 PROCEDURE — 72125 CT NECK SPINE W/O DYE: CPT

## 2018-12-28 PROCEDURE — 83605 ASSAY OF LACTIC ACID: CPT

## 2018-12-28 PROCEDURE — 80307 DRUG TEST PRSMV CHEM ANLYZR: CPT

## 2018-12-28 PROCEDURE — 72072 X-RAY EXAM THORAC SPINE 3VWS: CPT

## 2018-12-28 PROCEDURE — 71045 X-RAY EXAM CHEST 1 VIEW: CPT

## 2018-12-28 RX ORDER — SULFAMETHOXAZOLE AND TRIMETHOPRIM 800; 160 MG/1; MG/1
1 TABLET ORAL ONCE
Status: COMPLETED | OUTPATIENT
Start: 2018-12-28 | End: 2018-12-28

## 2018-12-28 RX ORDER — CEPHALEXIN 500 MG/1
500 CAPSULE ORAL ONCE
Status: COMPLETED | OUTPATIENT
Start: 2018-12-28 | End: 2018-12-28

## 2018-12-28 RX ADMIN — SULFAMETHOXAZOLE AND TRIMETHOPRIM 1 TABLET: 800; 160 TABLET ORAL at 20:06

## 2018-12-28 RX ADMIN — IOHEXOL 55 ML: 350 INJECTION, SOLUTION INTRAVENOUS at 22:30

## 2018-12-28 RX ADMIN — CEPHALEXIN 500 MG: 500 CAPSULE ORAL at 20:06

## 2018-12-28 ASSESSMENT — LIFESTYLE VARIABLES
EVER FELT BAD OR GUILTY ABOUT YOUR DRINKING: NO
TOTAL SCORE: 0
HAVE YOU EVER FELT YOU SHOULD CUT DOWN ON YOUR DRINKING: NO
TOTAL SCORE: 0
EVER HAD A DRINK FIRST THING IN THE MORNING TO STEADY YOUR NERVES TO GET RID OF A HANGOVER: NO
HAVE PEOPLE ANNOYED YOU BY CRITICIZING YOUR DRINKING: NO
CONSUMPTION TOTAL: INCOMPLETE
DO YOU DRINK ALCOHOL: YES
TOTAL SCORE: 0

## 2018-12-28 ASSESSMENT — PAIN SCALES - GENERAL
PAINLEVEL_OUTOF10: 3
PAINLEVEL_OUTOF10: 3

## 2018-12-29 ENCOUNTER — APPOINTMENT (OUTPATIENT)
Dept: RADIOLOGY | Facility: MEDICAL CENTER | Age: 53
End: 2018-12-29
Attending: INTERNAL MEDICINE
Payer: MEDICAID

## 2018-12-29 ENCOUNTER — APPOINTMENT (OUTPATIENT)
Dept: RADIOLOGY | Facility: MEDICAL CENTER | Age: 53
End: 2018-12-29
Attending: NEUROLOGICAL SURGERY
Payer: MEDICAID

## 2018-12-29 ENCOUNTER — APPOINTMENT (OUTPATIENT)
Dept: RADIOLOGY | Facility: MEDICAL CENTER | Age: 53
End: 2018-12-29
Attending: HOSPITALIST
Payer: MEDICAID

## 2018-12-29 PROBLEM — S32.000A LUMBAR COMPRESSION FRACTURE (HCC): Status: ACTIVE | Noted: 2018-12-29

## 2018-12-29 PROBLEM — I50.32 CHRONIC DIASTOLIC HEART FAILURE (HCC): Status: ACTIVE | Noted: 2018-11-21

## 2018-12-29 LAB
CORTIS SERPL-MCNC: 8.4 UG/DL (ref 0–23)
EST. AVERAGE GLUCOSE BLD GHB EST-MCNC: 123 MG/DL
HBA1C MFR BLD: 5.9 % (ref 0–5.6)

## 2018-12-29 PROCEDURE — 99220 PR INITIAL OBSERVATION CARE,LEVL III: CPT | Performed by: HOSPITALIST

## 2018-12-29 PROCEDURE — 73201 CT UPPER EXTREMITY W/DYE: CPT | Mod: RT

## 2018-12-29 PROCEDURE — 700102 HCHG RX REV CODE 250 W/ 637 OVERRIDE(OP): Performed by: EMERGENCY MEDICINE

## 2018-12-29 PROCEDURE — 700105 HCHG RX REV CODE 258: Performed by: HOSPITALIST

## 2018-12-29 PROCEDURE — 93880 EXTRACRANIAL BILAT STUDY: CPT

## 2018-12-29 PROCEDURE — 700117 HCHG RX CONTRAST REV CODE 255: Performed by: INTERNAL MEDICINE

## 2018-12-29 PROCEDURE — 700101 HCHG RX REV CODE 250: Performed by: INTERNAL MEDICINE

## 2018-12-29 PROCEDURE — 93970 EXTREMITY STUDY: CPT

## 2018-12-29 PROCEDURE — A9270 NON-COVERED ITEM OR SERVICE: HCPCS | Performed by: HOSPITALIST

## 2018-12-29 PROCEDURE — 93970 EXTREMITY STUDY: CPT | Mod: 26 | Performed by: SURGERY

## 2018-12-29 PROCEDURE — L0637 LSO SC R ANT/POS PNL PRE CST: HCPCS

## 2018-12-29 PROCEDURE — 93880 EXTRACRANIAL BILAT STUDY: CPT | Mod: 26 | Performed by: SURGERY

## 2018-12-29 PROCEDURE — 700102 HCHG RX REV CODE 250 W/ 637 OVERRIDE(OP): Performed by: INTERNAL MEDICINE

## 2018-12-29 PROCEDURE — A9270 NON-COVERED ITEM OR SERVICE: HCPCS | Performed by: INTERNAL MEDICINE

## 2018-12-29 PROCEDURE — 700105 HCHG RX REV CODE 258: Performed by: EMERGENCY MEDICINE

## 2018-12-29 PROCEDURE — G0378 HOSPITAL OBSERVATION PER HR: HCPCS

## 2018-12-29 PROCEDURE — 700102 HCHG RX REV CODE 250 W/ 637 OVERRIDE(OP): Performed by: HOSPITALIST

## 2018-12-29 PROCEDURE — 72100 X-RAY EXAM L-S SPINE 2/3 VWS: CPT

## 2018-12-29 RX ORDER — ACETAMINOPHEN 325 MG/1
650 TABLET ORAL EVERY 6 HOURS PRN
Status: DISCONTINUED | OUTPATIENT
Start: 2018-12-29 | End: 2019-02-27 | Stop reason: HOSPADM

## 2018-12-29 RX ORDER — ACETAMINOPHEN 500 MG
1000 TABLET ORAL EVERY 6 HOURS
Status: DISPENSED | OUTPATIENT
Start: 2018-12-29 | End: 2019-01-03

## 2018-12-29 RX ORDER — MIDODRINE HYDROCHLORIDE 5 MG/1
10 TABLET ORAL 3 TIMES DAILY
Status: DISCONTINUED | OUTPATIENT
Start: 2018-12-29 | End: 2018-12-31

## 2018-12-29 RX ORDER — CLINDAMYCIN PHOSPHATE 600 MG/50ML
600 INJECTION, SOLUTION INTRAVENOUS EVERY 8 HOURS
Status: DISCONTINUED | OUTPATIENT
Start: 2018-12-29 | End: 2018-12-31

## 2018-12-29 RX ORDER — OXYCODONE HYDROCHLORIDE 5 MG/1
2.5 TABLET ORAL
Status: DISCONTINUED | OUTPATIENT
Start: 2018-12-29 | End: 2019-02-27 | Stop reason: HOSPADM

## 2018-12-29 RX ORDER — AMOXICILLIN 250 MG
2 CAPSULE ORAL 2 TIMES DAILY
Status: DISCONTINUED | OUTPATIENT
Start: 2018-12-29 | End: 2019-02-27 | Stop reason: HOSPADM

## 2018-12-29 RX ORDER — FLUDROCORTISONE ACETATE 0.1 MG/1
0.3 TABLET ORAL EVERY MORNING
Status: DISCONTINUED | OUTPATIENT
Start: 2018-12-29 | End: 2019-01-25

## 2018-12-29 RX ORDER — ONDANSETRON 4 MG/1
4 TABLET, ORALLY DISINTEGRATING ORAL EVERY 4 HOURS PRN
Status: DISCONTINUED | OUTPATIENT
Start: 2018-12-29 | End: 2019-01-28

## 2018-12-29 RX ORDER — PROMETHAZINE HYDROCHLORIDE 25 MG/1
12.5-25 SUPPOSITORY RECTAL EVERY 4 HOURS PRN
Status: DISCONTINUED | OUTPATIENT
Start: 2018-12-29 | End: 2019-01-28

## 2018-12-29 RX ORDER — PRAVASTATIN SODIUM 20 MG
20 TABLET ORAL DAILY
Status: DISCONTINUED | OUTPATIENT
Start: 2018-12-29 | End: 2019-01-29

## 2018-12-29 RX ORDER — BISACODYL 10 MG
10 SUPPOSITORY, RECTAL RECTAL
Status: DISCONTINUED | OUTPATIENT
Start: 2018-12-29 | End: 2019-02-27 | Stop reason: HOSPADM

## 2018-12-29 RX ORDER — LISINOPRIL 5 MG/1
5 TABLET ORAL DAILY
Status: ON HOLD | COMMUNITY
End: 2019-02-27

## 2018-12-29 RX ORDER — SODIUM CHLORIDE 9 MG/ML
500 INJECTION, SOLUTION INTRAVENOUS ONCE
Status: COMPLETED | OUTPATIENT
Start: 2018-12-29 | End: 2018-12-29

## 2018-12-29 RX ORDER — PROMETHAZINE HYDROCHLORIDE 25 MG/1
12.5-25 TABLET ORAL EVERY 4 HOURS PRN
Status: DISCONTINUED | OUTPATIENT
Start: 2018-12-29 | End: 2019-01-28

## 2018-12-29 RX ORDER — SODIUM CHLORIDE 9 MG/ML
INJECTION, SOLUTION INTRAVENOUS CONTINUOUS
Status: DISCONTINUED | OUTPATIENT
Start: 2018-12-29 | End: 2018-12-30

## 2018-12-29 RX ORDER — FLUOXETINE HYDROCHLORIDE 20 MG/1
20 CAPSULE ORAL DAILY
Status: DISCONTINUED | OUTPATIENT
Start: 2018-12-29 | End: 2019-01-23

## 2018-12-29 RX ORDER — CLONIDINE HYDROCHLORIDE 0.1 MG/1
0.1 TABLET ORAL EVERY 6 HOURS PRN
Status: DISCONTINUED | OUTPATIENT
Start: 2018-12-29 | End: 2019-01-13

## 2018-12-29 RX ORDER — NYSTATIN 100000 [USP'U]/G
POWDER TOPICAL ONCE
Status: COMPLETED | OUTPATIENT
Start: 2018-12-29 | End: 2018-12-29

## 2018-12-29 RX ORDER — PYRIDOSTIGMINE BROMIDE 60 MG/1
90 TABLET ORAL 3 TIMES DAILY
Status: DISCONTINUED | OUTPATIENT
Start: 2018-12-29 | End: 2019-01-01

## 2018-12-29 RX ORDER — OXYCODONE HYDROCHLORIDE 5 MG/1
5 TABLET ORAL
Status: DISCONTINUED | OUTPATIENT
Start: 2018-12-29 | End: 2019-02-13

## 2018-12-29 RX ORDER — AMIODARONE HYDROCHLORIDE 200 MG/1
50 TABLET ORAL DAILY
Status: DISCONTINUED | OUTPATIENT
Start: 2018-12-29 | End: 2019-02-27 | Stop reason: HOSPADM

## 2018-12-29 RX ORDER — POLYETHYLENE GLYCOL 3350 17 G/17G
1 POWDER, FOR SOLUTION ORAL
Status: DISCONTINUED | OUTPATIENT
Start: 2018-12-29 | End: 2019-02-27 | Stop reason: HOSPADM

## 2018-12-29 RX ORDER — ONDANSETRON 2 MG/ML
4 INJECTION INTRAMUSCULAR; INTRAVENOUS EVERY 4 HOURS PRN
Status: DISCONTINUED | OUTPATIENT
Start: 2018-12-29 | End: 2019-01-28

## 2018-12-29 RX ADMIN — RIVAROXABAN 20 MG: 20 TABLET, FILM COATED ORAL at 18:45

## 2018-12-29 RX ADMIN — NYSTATIN: 100000 POWDER TOPICAL at 03:17

## 2018-12-29 RX ADMIN — OXYCODONE HYDROCHLORIDE 2.5 MG: 5 TABLET ORAL at 20:54

## 2018-12-29 RX ADMIN — PYRIDOSTIGMINE BROMIDE 90 MG: 60 TABLET ORAL at 18:45

## 2018-12-29 RX ADMIN — PYRIDOSTIGMINE BROMIDE 90 MG: 60 TABLET ORAL at 13:21

## 2018-12-29 RX ADMIN — PRAVASTATIN SODIUM 20 MG: 20 TABLET ORAL at 18:45

## 2018-12-29 RX ADMIN — SODIUM CHLORIDE: 9 INJECTION, SOLUTION INTRAVENOUS at 07:53

## 2018-12-29 RX ADMIN — FLUDROCORTISONE ACETATE 0.3 MG: 0.1 TABLET ORAL at 07:52

## 2018-12-29 RX ADMIN — CLINDAMYCIN IN 5 PERCENT DEXTROSE 600 MG: 12 INJECTION, SOLUTION INTRAVENOUS at 20:57

## 2018-12-29 RX ADMIN — MIDODRINE HYDROCHLORIDE 10 MG: 5 TABLET ORAL at 07:53

## 2018-12-29 RX ADMIN — FLUOXETINE HYDROCHLORIDE 20 MG: 20 CAPSULE ORAL at 07:53

## 2018-12-29 RX ADMIN — ACETAMINOPHEN 1000 MG: 500 TABLET ORAL at 13:20

## 2018-12-29 RX ADMIN — MIDODRINE HYDROCHLORIDE 10 MG: 5 TABLET ORAL at 13:21

## 2018-12-29 RX ADMIN — ACETAMINOPHEN 1000 MG: 500 TABLET ORAL at 18:48

## 2018-12-29 RX ADMIN — SODIUM CHLORIDE 500 ML: 9 INJECTION, SOLUTION INTRAVENOUS at 03:17

## 2018-12-29 RX ADMIN — MIDODRINE HYDROCHLORIDE 10 MG: 5 TABLET ORAL at 18:45

## 2018-12-29 RX ADMIN — CLINDAMYCIN IN 5 PERCENT DEXTROSE 600 MG: 12 INJECTION, SOLUTION INTRAVENOUS at 14:42

## 2018-12-29 RX ADMIN — AMIODARONE HYDROCHLORIDE 50 MG: 200 TABLET ORAL at 07:53

## 2018-12-29 RX ADMIN — IOHEXOL 100 ML: 350 INJECTION, SOLUTION INTRAVENOUS at 19:35

## 2018-12-29 RX ADMIN — SODIUM CHLORIDE: 9 INJECTION, SOLUTION INTRAVENOUS at 21:44

## 2018-12-29 RX ADMIN — SENNOSIDES AND DOCUSATE SODIUM 2 TABLET: 8.6; 5 TABLET ORAL at 07:53

## 2018-12-29 ASSESSMENT — LIFESTYLE VARIABLES
EVER_SMOKED: NEVER
ALCOHOL_USE: NO

## 2018-12-29 ASSESSMENT — COGNITIVE AND FUNCTIONAL STATUS - GENERAL
STANDING UP FROM CHAIR USING ARMS: A LITTLE
DAILY ACTIVITIY SCORE: 21
DRESSING REGULAR UPPER BODY CLOTHING: A LITTLE
MOBILITY SCORE: 21
WALKING IN HOSPITAL ROOM: A LITTLE
SUGGESTED CMS G CODE MODIFIER DAILY ACTIVITY: CJ
SUGGESTED CMS G CODE MODIFIER MOBILITY: CJ
HELP NEEDED FOR BATHING: A LITTLE
DRESSING REGULAR LOWER BODY CLOTHING: A LITTLE
CLIMB 3 TO 5 STEPS WITH RAILING: A LITTLE

## 2018-12-29 ASSESSMENT — COPD QUESTIONNAIRES
IN THE PAST 12 MONTHS DO YOU DO LESS THAN YOU USED TO BECAUSE OF YOUR BREATHING PROBLEMS: DISAGREE/UNSURE
DO YOU EVER COUGH UP ANY MUCUS OR PHLEGM?: NO/ONLY WITH OCCASIONAL COLDS OR INFECTIONS
HAVE YOU SMOKED AT LEAST 100 CIGARETTES IN YOUR ENTIRE LIFE: NO/DON'T KNOW
COPD SCREENING SCORE: 1
DURING THE PAST 4 WEEKS HOW MUCH DID YOU FEEL SHORT OF BREATH: NONE/LITTLE OF THE TIME

## 2018-12-29 ASSESSMENT — ENCOUNTER SYMPTOMS
BACK PAIN: 1
CARDIOVASCULAR NEGATIVE: 1
EYES NEGATIVE: 1
PSYCHIATRIC NEGATIVE: 1
CONSTITUTIONAL NEGATIVE: 1
RESPIRATORY NEGATIVE: 1
GASTROINTESTINAL NEGATIVE: 1
DIZZINESS: 1
LOSS OF CONSCIOUSNESS: 1
FALLS: 1

## 2018-12-29 ASSESSMENT — PAIN SCALES - GENERAL
PAINLEVEL_OUTOF10: 5
PAINLEVEL_OUTOF10: 3
PAINLEVEL_OUTOF10: 6
PAINLEVEL_OUTOF10: 8

## 2018-12-29 NOTE — ED PROVIDER NOTES
ED Provider Note    Scribed for Doug Mckeon M.D. by Kareen Hernandez. 12/28/2018, 6:19 PM.    Primary care provider: German Hinton M.D.  Means of arrival: Ambulance  History obtained from: Patient  History limited by: None     CHIEF COMPLAINT  Chief Complaint   Patient presents with   • ALOC       HPI  Sebastián Castro is a 53 y.o. Male with a history of CHF, Diabetes, and Hypertension who presents to the Emergency Department after a syncopal episode earlier today while walking home. He reports experiencing lightheadedness immediately prior to the syncopal event. Patient has also had a productive cough, chills, and generalize malaise for the last several days. Patient states he hit the right side of his head and now reports headache, nausea, and upper back pain. He continues to experience lightheadedness. He reports having orthostatic hypotension and felt his blood pressure drop before the syncopal event. Patient was admitted last month and was cardioverted secondary to atrial fibrillation. He denies chest pain.     REVIEW OF SYSTEMS  Pertinent positives include syncope, lightheadedness, cough, chills, generalized malaise, headache, nausea, upper back pain.   Pertinent negatives include chest pain.   All other systems negative.    PAST MEDICAL HISTORY   has a past medical history of A-fib (HCC); Chest pain; Congestive heart failure (HCC); Diabetes (HCC); Diabetic neuropathy (HCC); Hypercholesterolemia; Hypertension; Morbid obesity (HCC); Noncompliance; and Normal cardiac stress test.    SURGICAL HISTORY   has a past surgical history that includes toe amputation (Right, 11/10/2017); toe amputation (Right, 1/17/2018); irrigation & debridement ortho (Right, 2/19/2018); and cardiac cath (07/21/2018).    SOCIAL HISTORY  Social History   Substance Use Topics   • Smoking status: Never Smoker   • Smokeless tobacco: Never Used   • Alcohol use No      History   Drug Use No       FAMILY HISTORY  Family History   Problem  Relation Age of Onset   • No Known Problems Mother    • No Known Problems Father        CURRENT MEDICATIONS  Home Medications     Reviewed by Gauri Mustafa R.N. (Registered Nurse) on 12/28/18 at 1740  Med List Status: Not Addressed   Medication Last Dose Status   amiodarone (PACERONE) 100 MG tablet  Active   fludrocortisone (FLORINEF) 0.1 MG Tab  Active   FLUoxetine (PROZAC) 20 MG Cap  Active   furosemide (LASIX) 20 MG Tab  Active   metFORMIN (GLUCOPHAGE) 500 MG Tab  Active   metoprolol (LOPRESSOR) 25 MG Tab  Active   midodrine (PROAMATINE) 10 MG tablet  Active   potassium chloride (KLOR-CON) 20 MEQ Pack  Active   pravastatin (PRAVACHOL) 20 MG Tab  Active   pyridostigmine (MESTINON) 60 MG Tab  Active   rivaroxaban (XARELTO) 20 MG Tab tablet  Active   sodium chloride (SALT) 1 GM Tab  Active                ALLERGIES  Allergies   Allergen Reactions   • Daptomycin Rash     Body rash  RXN=1/2018     • Pcn [Penicillins] Hives and Rash      Rash & hives  RXN=since a kid   • Unasyn [Ampicillin-Sulbactam Sodium] Hives, Itching and Swelling   • Vancomycin Rash     Red man syndrome       PHYSICAL EXAM  VITAL SIGNS: BP (!) 169/95   Pulse 81   Temp 36.6 °C (97.9 °F) (Temporal)   Resp 18   Ht 1.829 m (6')   Wt 99.8 kg (220 lb)   BMI 29.84 kg/m²     Constitutional: Well developed, Well nourished, Mild distress.   HENT: Normocephalic, Oropharynx moist, Right forehead tenderness, No obvious abrasion or fracture.   Eyes: Conjunctiva normal, No discharge.   Neck: Supple, No stridor, Midline C spine tenderness.    Cardiovascular: Normal heart rate, Normal rhythm, No murmurs, equal pulses. Chronic lower extremity edema.   Pulmonary: Slightly diminished breath sounds, No respiratory distress, No wheezing, No rales, No rhonchi.  Chest: No chest wall tenderness or deformity.   Abdomen: 8 cm area of erythematous skin breakdown across lower abdomen under skin fold that appears to be secondary to candidiasis infection with mild  abdominal tenderness. No masses, no rebound, no guarding.   Back: Tenderness over T4.   Musculoskeletal: No major deformities noted, No calf pain, Appear symmetric.   Skin: Warm, Dry, No erythema, No rash.   Neurologic: Alert & oriented x 3, Normal motor function,  No focal deficits noted.   Psychiatric: Affect normal, Judgment normal, Mood normal.     LABS  Results for orders placed or performed during the hospital encounter of 12/28/18   CBC WITH DIFFERENTIAL   Result Value Ref Range    WBC 7.2 4.8 - 10.8 K/uL    RBC 4.96 4.70 - 6.10 M/uL    Hemoglobin 14.0 14.0 - 18.0 g/dL    Hematocrit 41.4 (L) 42.0 - 52.0 %    MCV 83.5 81.4 - 97.8 fL    MCH 28.2 27.0 - 33.0 pg    MCHC 33.8 33.7 - 35.3 g/dL    RDW 42.5 35.9 - 50.0 fL    Platelet Count 160 (L) 164 - 446 K/uL    MPV 10.3 9.0 - 12.9 fL    Neutrophils-Polys 69.50 44.00 - 72.00 %    Lymphocytes 16.10 (L) 22.00 - 41.00 %    Monocytes 9.40 0.00 - 13.40 %    Eosinophils 4.30 0.00 - 6.90 %    Basophils 0.40 0.00 - 1.80 %    Immature Granulocytes 0.30 0.00 - 0.90 %    Nucleated RBC 0.00 /100 WBC    Neutrophils (Absolute) 5.00 1.82 - 7.42 K/uL    Lymphs (Absolute) 1.16 1.00 - 4.80 K/uL    Monos (Absolute) 0.68 0.00 - 0.85 K/uL    Eos (Absolute) 0.31 0.00 - 0.51 K/uL    Baso (Absolute) 0.03 0.00 - 0.12 K/uL    Immature Granulocytes (abs) 0.02 0.00 - 0.11 K/uL    NRBC (Absolute) 0.00 K/uL   COMP METABOLIC PANEL   Result Value Ref Range    Sodium 138 135 - 145 mmol/L    Potassium 3.6 3.6 - 5.5 mmol/L    Chloride 102 96 - 112 mmol/L    Co2 27 20 - 33 mmol/L    Anion Gap 9.0 0.0 - 11.9    Glucose 113 (H) 65 - 99 mg/dL    Bun 16 8 - 22 mg/dL    Creatinine 0.57 0.50 - 1.40 mg/dL    Calcium 9.2 8.5 - 10.5 mg/dL    AST(SGOT) 23 12 - 45 U/L    ALT(SGPT) 35 2 - 50 U/L    Alkaline Phosphatase 92 30 - 99 U/L    Total Bilirubin 0.8 0.1 - 1.5 mg/dL    Albumin 3.3 3.2 - 4.9 g/dL    Total Protein 6.5 6.0 - 8.2 g/dL    Globulin 3.2 1.9 - 3.5 g/dL    A-G Ratio 1.0 g/dL   TROPONIN   Result  Value Ref Range    Troponin I <0.01 0.00 - 0.04 ng/mL   LACTIC ACID   Result Value Ref Range    Lactic Acid 1.0 0.5 - 2.0 mmol/L   DIAGNOSTIC ALCOHOL   Result Value Ref Range    Diagnostic Alcohol 0.00 0.00 g/dL   D-Dimer (only helpful in low pre-test probability wells critieria. Do not order if patient ruled out by PERC criteria. See Weblinks at top of Labs section)   Result Value Ref Range    D-Dimer Screen 0.88 (H) 0.00 - 0.50 ug/mL (FEU)   ESTIMATED GFR   Result Value Ref Range    GFR If African American >60 >60 mL/min/1.73 m 2    GFR If Non African American >60 >60 mL/min/1.73 m 2   EKG (NOW)   Result Value Ref Range    Report       [facility] Emergency Dept.    Test Date:  2018  Pt Name:    EMELY FLORES                Department: ER  MRN:        [id number]                      Room:        21   Gender:     Male                         Technician: [id number]  :        1965                   Requested By:MAYCO NASSAR  Order #:    [id number]                    Reading MD:    Measurements  Intervals                                Axis  Rate:       80                           P:          70  DE:         196                          QRS:        -29  QRSD:       102                          T:          47  QT:         400  QTc:        462    Interpretive Statements  SINUS RHYTHM  LOW VOLTAGE IN FRONTAL LEADS  LATE PRECORDIAL R/S TRANSITION  Compared to ECG 2018 12:56:11  First degree AV block no longer present     All labs reviewed by me.    EKG  12 Lead EKG interpreted by me shows a normal sinus rhythm at a rate of 80. Leftward axis. No ST elevations. No T wave inversions. Old EKG from 18 shows no significant changes. Final impression: No acute changes, Sinus rhythm.    RADIOLOGY  CT-CTA CHEST PULMONARY ARTERY W/ RECONS   Final Result         1. No CT evidence of pulmonary embolism.      2. Old right rib fractures.      3. Cholelithiasis. Enlarged spleen with  granulomas.      4. Mild superior endplate compression deformity of L1 could be acute. Correlate with point tenderness. No retropulsion. No malalignment.      CT-CSPINE WITHOUT PLUS RECONS   Final Result         1. No acute fracture from C1 through T3 is visualized.         CT-HEAD W/O   Final Result         1. No acute intracranial abnormality. No evidence of acute intracranial hemorrhage or mass lesion.               DX-THORACIC SPINE-WITH SWIMMERS VIEW   Final Result      No acute thoracic spine fracture or subluxation identified.      DX-CHEST-PORTABLE (1 VIEW)   Final Result      No acute cardiac or pulmonary abnormality is noted.      The radiologist's interpretation of all radiological studies have been reviewed by me.    COURSE & MEDICAL DECISION MAKING  Pertinent Labs & Imaging studies reviewed. (See chart for details)    Review of past medical records shows the patient was seen on 12/22/18 secondary to chest pain. Patient was admitted on 11/21/18 when he underwent a heart catheterization last month which was negative. He was in atrial fibrillation during admission and was cardioverted at that time. Patient has complained of dizziness and orthostatic hypotension since then.     6:19 PM Patient seen and examined at bedside. Patient will be treated with 500 mg Keflex and 800-160 mg bactrim. Ordered CT Head, CT C spine, DX T spine, DX chest, EKG, Diagnostic alcohol, D-Dimer, CBC, CMP, Troponin, and Lactic acid to evaluate his symptoms. The differential diagnoses include but are not limited to: Intracranial hemmorhage, Neck injury, Orthostatic hypotension, Pseudo seizure, Seizure, Electrolyte abnormality, PE, Infectious process, Pneumonia, Cellulitis, Sepsis    8:35 PM Patient reevaluated at bedside. Discussed positive D-Dimer results with patient. Ordered CT-CTA chest for further evaluation.     12:45 AM Patient reevaluated at bedside. Discussed diagnostic results which showed an L1 compression fracture but  otherwise no abnormalities. Given that his blood pressure has dropped significantly, he will be admitted for work up of his orthostatic hypotension. Patient will be given 500 mL IV fluids. He understands and agrees.     12:47 AM Paged Dr. Pepper, Neurosurgery, and Dr. Wheeler, Hospitalist.     1:01 AM Consulted Dr. Wheeler, who agreed to admit patient.     1:18 AM Consulted Dr. Pepper, who agreed to evaluate patient.  He asked for the patient to get an LSO brace.    Medical Decision Making: At this point time appears patient has syncopal event with ground-level fall.  It appears he has had a compression fracture secondary to this.  Patient still orthostatic and symptomatic when standing therefore I think he would benefit from admission.  Patient does not appear to have a electrolyte abnormality.  CTs were done because of patient's loss of consciousness and being altered.  Does not show any signs of acute intracranial hemorrhage.  Patient also has an abdominal wall cellulitis that looks to be secondary to candidiasis.  We will start the patient on Bactrim and Keflex.  And give him nystatin powder.  No signs of systemic sepsis.    DISPOSITION:  Patient will be admitted to Dr. Wheeler, Hospitalist, in guarded condition.     FINAL IMPRESSION  1. Orthostatic syncope    2. Closed compression fracture of first lumbar vertebra, initial encounter ([facility])    3. Cellulitis of abdominal wall       Kareen CLARKE (Antnoina), am scribing for, and in the presence of, Doug Mckeon M.D.  Electronically signed by: Kareen Hernandez (Antonina), 12/28/2018  Doug CLARKE M.D. personally performed the services described in this documentation, as scribed by Kareen Hernandez in my presence, and it is both accurate and complete. C.     The note accurately reflects work and decisions made by me.  Doug Mckeon  12/29/2018  3:11 AM

## 2018-12-29 NOTE — ED TRIAGE NOTES
Pt BIB EMS for altered LOC. Pt was reportedly found down on the ground, unsure if syncope, seizure, or something else. Pt reporting headache on the right side from fall, and right side of neck from hitting the ground. Pt with severely swollen bilateral lower legs, reporting this is the same as always. No other complaints at this time.

## 2018-12-29 NOTE — ASSESSMENT & PLAN NOTE
Euvolemic and without symptoms of heart failure.    -Unable to tolerate Lasix or lisinopril given his orthostatic hypotension

## 2018-12-29 NOTE — ASSESSMENT & PLAN NOTE
Currently asymptomatic and ambulating around the nurses station.  States that it started to recur more frequently after his fall, prior to presentation  -Tolerating decrease in Florinef to 0.2 mg  -Midodrine adjusted parameters (Hold for SBP > 150)

## 2018-12-29 NOTE — ED NOTES
Pt sleeping. No distress noted . Bilateral breath sounds. Hints of sleep apnea. o2 placed after saturation was 86%.

## 2018-12-29 NOTE — CARE PLAN
Problem: Pain Management  Goal: Pain level will decrease to patient's comfort goal  Outcome: PROGRESSING AS EXPECTED      Problem: Fluid Volume:  Goal: Will maintain balanced intake and output  Outcome: PROGRESSING AS EXPECTED      Problem: Communication  Goal: The ability to communicate needs accurately and effectively will improve  Outcome: PROGRESSING AS EXPECTED      Problem: Safety  Goal: Will remain free from injury  Outcome: PROGRESSING AS EXPECTED    Goal: Will remain free from falls  Outcome: PROGRESSING AS EXPECTED

## 2018-12-29 NOTE — PROGRESS NOTES
53-year-old male with history of CHF, paroxysmal A. fib, diabetes neuropathy, dyslipidemia, hypertension, noncompliance, persistent orthostatic dizziness/hypotension, presented with complaints of orthostatic syncopal and dizziness, back pain.  Found to have a L1 fracture from ground-level fall.  Neurosurgery consulted.  Neurosurgery is planned.  Ordered CAT scan of the right head due to swelling, pain and erythema.  Started on clindamycin for possible cellulitis.  Ordered bilateral lower extremity DVT study due to edema.

## 2018-12-29 NOTE — PROGRESS NOTES
Report from jorge luis PÉREZ. Patient transferred from ED   to telemetry 8. Telemetry monitor placed on the patient.  Patient encouraged to call for assistance. Call light within reach. Bed locked and in lowest position. Patient verbalized an understanding of calling for assistance and the routine for the unit. Shift and off report to Julia PÉREZ

## 2018-12-29 NOTE — PROGRESS NOTES
2 RN skin assessment completed with ELISA Smith.    Ears red but blanching.  Sacrum red but blanches with a couple small abrasions.  Right hand red and swollen.  Abdomen and groin has moisture breakdown and red/yeast in all skin folds.  Right back knee open wound with moisture breakdown.  Left bottom foot has purple area with dryness.  Right great toe old amputation.  Will continue to monitor.

## 2018-12-29 NOTE — H&P
Hospital Medicine History & Physical Note    Date of Service  12/29/2018    Primary Care Physician  German Hinton M.D.    Consultants  Neurosurgery Evgeny    Code Status  Full code     Chief Complaint  Low back pain, passed out    History of Presenting Illness  53 y.o. male with history of orthostatic hypotension, with salt tablets as treatment, congestive heart failure with diastolic dysfunction with no current exacerbation, and atrial fibrillation which is apparently paroxysmal and for which he recently had a cardioversion, was in his usual state of health until the day prior to admission.  He was out walking with his girlfriend, when he suddenly felt weak, falling to the floor, he remembers coming to when emergency medical services were at his side.  He states that he fell after passing out, and hit his head, as well as his back.  He does have pain in his lower back which is made worse with any attempts at movement, with no significant alleviating factors.    Review of Systems  Review of Systems   Constitutional: Negative.    HENT: Negative.    Eyes: Negative.    Respiratory: Negative.    Cardiovascular: Negative.    Gastrointestinal: Negative.    Genitourinary: Negative.    Musculoskeletal: Positive for back pain and falls.   Skin: Negative.    Neurological: Positive for dizziness and loss of consciousness.   Endo/Heme/Allergies: Negative.    Psychiatric/Behavioral: Negative.        Past Medical History   has a past medical history of A-fib ([facility]); Chest pain; Congestive heart failure (HCC); Diabetes (HCC); Diabetic neuropathy (HCC); Hypercholesterolemia; Hypertension; Morbid obesity (HCC); Noncompliance; and Normal cardiac stress test.    Surgical History   has a past surgical history that includes toe amputation (Right, 11/10/2017); toe amputation (Right, 1/17/2018); irrigation & debridement ortho (Right, 2/19/2018); and cardiac cath (07/21/2018).     Family History  family history includes No Known Problems  in his father and mother.     Social History   reports that he has never smoked. He has never used smokeless tobacco. He reports that he does not drink alcohol or use drugs.    Allergies  Allergies   Allergen Reactions   • Daptomycin Rash     Body rash  RXN=1/2018     • Pcn [Penicillins] Hives and Rash      Rash & hives  RXN=since a kid   • Unasyn [Ampicillin-Sulbactam Sodium] Hives, Itching and Swelling   • Vancomycin Rash     Red man syndrome       Medications  Prior to Admission Medications   Prescriptions Last Dose Informant Patient Reported? Taking?   FLUoxetine (PROZAC) 20 MG Cap   No No   Sig: Take 1 Cap by mouth every day.   amiodarone (PACERONE) 100 MG tablet   No No   Sig: Take 0.5 Tabs by mouth every day.   fludrocortisone (FLORINEF) 0.1 MG Tab   No No   Sig: Take 3 Tabs by mouth every morning.   furosemide (LASIX) 20 MG Tab   No No   Sig: Take 1 Tab by mouth as needed (Edema, Shortness of breath or weight gain.).   metFORMIN (GLUCOPHAGE) 500 MG Tab  Patient Yes No   Sig: Take 500 mg by mouth 2 Times a Day.   metoprolol (LOPRESSOR) 25 MG Tab  Patient No No   Sig: Take 1 Tab by mouth 2 times a day.   midodrine (PROAMATINE) 10 MG tablet   No No   Sig: Take 1 Tab by mouth 3 times a day.   potassium chloride (KLOR-CON) 20 MEQ Pack   Yes No   Sig: Take 10 mEq by mouth every day.   pravastatin (PRAVACHOL) 20 MG Tab  Patient No No   Sig: Take 1 Tab by mouth every day.   pyridostigmine (MESTINON) 60 MG Tab   No No   Sig: Take 1.5 Tabs by mouth 3 times a day.   rivaroxaban (XARELTO) 20 MG Tab tablet  Patient No No   Sig: Take 1 Tab by mouth with dinner.   sodium chloride (SALT) 1 GM Tab   No No   Sig: Take 1 Tab by mouth 3 times a day, with meals.      Facility-Administered Medications: None       Physical Exam  Temp:  [36.6 °C (97.9 °F)] 36.6 °C (97.9 °F)  Pulse:  [79-88] 79  Resp:  [15-18] 15  BP: ()/(55-95) 87/55    Physical Exam   Constitutional: He is oriented to person, place, and time. He appears  well-developed and well-nourished. No distress.   HENT:   Head: Normocephalic and atraumatic.   Eyes: Pupils are equal, round, and reactive to light. Conjunctivae are normal.   Neck: Normal range of motion. Neck supple. No tracheal deviation present. No thyromegaly present.   Cardiovascular: Normal rate, regular rhythm and normal heart sounds.  Exam reveals no gallop and no friction rub.    No murmur heard.  Pulmonary/Chest: Effort normal and breath sounds normal. No respiratory distress. He has no wheezes.   Abdominal: Soft. Bowel sounds are normal. He exhibits no distension and no mass. There is no tenderness. There is no rebound and no guarding.   Musculoskeletal: Normal range of motion. He exhibits edema and tenderness.   Lymphadenopathy:     He has no cervical adenopathy.   Neurological: He is alert and oriented to person, place, and time. No cranial nerve deficit.   Skin: Skin is warm and dry. He is not diaphoretic. There is erythema.   Psychiatric: He has a normal mood and affect.   Nursing note and vitals reviewed.      Laboratory:  Recent Labs      12/28/18 1845   WBC  7.2   RBC  4.96   HEMOGLOBIN  14.0   HEMATOCRIT  41.4*   MCV  83.5   MCH  28.2   MCHC  33.8   RDW  42.5   PLATELETCT  160*   MPV  10.3     Recent Labs      12/28/18   1845   SODIUM  138   POTASSIUM  3.6   CHLORIDE  102   CO2  27   GLUCOSE  113*   BUN  16   CREATININE  0.57   CALCIUM  9.2     Recent Labs      12/28/18 1845   ALTSGPT  35   ASTSGOT  23   ALKPHOSPHAT  92   TBILIRUBIN  0.8   GLUCOSE  113*                 Recent Labs      12/28/18 1845   TROPONINI  <0.01       Urinalysis:    No results found     Imaging:  CT-CTA CHEST PULMONARY ARTERY W/ RECONS   Final Result         1. No CT evidence of pulmonary embolism.      2. Old right rib fractures.      3. Cholelithiasis. Enlarged spleen with granulomas.      4. Mild superior endplate compression deformity of L1 could be acute. Correlate with point tenderness. No retropulsion. No  malalignment.      CT-CSPINE WITHOUT PLUS RECONS   Final Result         1. No acute fracture from C1 through T3 is visualized.         CT-HEAD W/O   Final Result         1. No acute intracranial abnormality. No evidence of acute intracranial hemorrhage or mass lesion.               DX-THORACIC SPINE-WITH SWIMMERS VIEW   Final Result      No acute thoracic spine fracture or subluxation identified.      DX-CHEST-PORTABLE (1 VIEW)   Final Result      No acute cardiac or pulmonary abnormality is noted.      US-CAROTID DOPPLER BILAT    (Results Pending)         Assessment/Plan:  I anticipate this patient is appropriate for observation status at this time.    * Syncope- (present on admission)   Assessment & Plan    Likely due to orthostasis.  Plan for monitoring with telemetry, orthostatic vital signs, carotid doppler.       Lumbar compression fracture (HCC)   Assessment & Plan    Pain management as needed.  Monitor.  PT eval.  Neurosurgery recommended TLSO brace.      Orthostatic hypotension- (present on admission)   Assessment & Plan    On  Medication regimen.  Monitor.      Chronic diastolic heart failure (HCC)- (present on admission)   Assessment & Plan    Without current exacerbation.  May be contributing to existence of peripheral edema.       Venous stasis dermatitis of both lower extremities- (present on admission)   Assessment & Plan    Monitor.      Bilateral leg edema- (present on admission)   Assessment & Plan    Appears to have chronic edematous changes.  Monitor.  Compression stockings on outpatient basis.      Essential hypertension- (present on admission)   Assessment & Plan    Controlled with current medication regimen.  Monitor.      Mixed hyperlipidemia- (present on admission)   Assessment & Plan    Continue current therapy          VTE prophylaxis: SCD, lovenox

## 2018-12-29 NOTE — CONSULTS
DATE OF SERVICE:  12/29/2018    EMERGENCY ROOM CONSULTATION    CHIEF COMPLAINT:  Syncope.    HISTORY OF PRESENT ILLNESS:  This is a 53-year-old with multiple medical   problems who had a syncopal fall.  He felt back pain without weakness.  He has   a chronic L2 down circumferential leg numbness due to diabetic neuropathy   that has been going on for several months.  He has a CT of the chest, which   does go down to L1 shows a possible L1 compression fracture.  It definitely   looks like he has osteopenia/osteoporosis.    PAST MEDICAL HISTORY:  Significant for atrial fibrillation, congestive heart   failure, diabetes, neuropathy, hyperlipidemia, hypertension, and morbid   obesity.    SURGERIES:  Include a toe surgery, amputation, cardiac catheterization, and I   and D of the wound of the toe.    SOCIAL HISTORY:  He is a nonsmoker, nondrinker.    His medical history is on the chart.    PHYSICAL EXAMINATION:  GENERAL:  This man is morbidly obese with flaccidity of skin and adipose   tissue.  NEUROLOGIC:  He has mild back pain.  He has full strength in the iliopsoas,   thigh adductor, abductor, quadriceps, dorsiflexion, and plantar flexion   bilateral.  EHLs are 4/5.  He has decreased sensation circumferentially from   about mid thigh down in a nondermatomal fashion, areflexic.    ASSESSMENT AND PLAN:  The patient has minimal compression fracture, but   definitely has osteoporosis.  This should be treated with outpatient DEXA scan   and to prevent further fractures, I recommend a LSO off-the-shelf and upright   lumbar x-rays, PT evaluation.  He can follow up as an outpatient in our [facility] ([phone] for an appointment) in about 4 weeks with   another set of upright lumbar x-rays.  We will follow him in 3 months.  If the   x-rays show worsening, he could be a candidate for kyphoplasty.  He is not a   surgical candidate overall and does not need it.       ____________________________________     Hemal GAMBOA  MD MO Pepper III / NOEMY    DD:  12/29/2018 08:51:29  DT:  12/29/2018 10:03:00    D#:  [id number]  Job#:  [id number]

## 2018-12-30 PROBLEM — L03.011 CELLULITIS OF FINGER OF RIGHT HAND: Status: ACTIVE | Noted: 2018-12-30

## 2018-12-30 LAB
ANION GAP SERPL CALC-SCNC: 7 MMOL/L (ref 0–11.9)
BASOPHILS # BLD AUTO: 0.5 % (ref 0–1.8)
BASOPHILS # BLD: 0.03 K/UL (ref 0–0.12)
BUN SERPL-MCNC: 20 MG/DL (ref 8–22)
CALCIUM SERPL-MCNC: 8.5 MG/DL (ref 8.5–10.5)
CHLORIDE SERPL-SCNC: 104 MMOL/L (ref 96–112)
CO2 SERPL-SCNC: 27 MMOL/L (ref 20–33)
CREAT SERPL-MCNC: 0.71 MG/DL (ref 0.5–1.4)
EOSINOPHIL # BLD AUTO: 0.32 K/UL (ref 0–0.51)
EOSINOPHIL NFR BLD: 5.3 % (ref 0–6.9)
ERYTHROCYTE [DISTWIDTH] IN BLOOD BY AUTOMATED COUNT: 44.2 FL (ref 35.9–50)
GLUCOSE SERPL-MCNC: 92 MG/DL (ref 65–99)
HCT VFR BLD AUTO: 38.3 % (ref 42–52)
HGB BLD-MCNC: 12.7 G/DL (ref 14–18)
IMM GRANULOCYTES # BLD AUTO: 0.02 K/UL (ref 0–0.11)
IMM GRANULOCYTES NFR BLD AUTO: 0.3 % (ref 0–0.9)
LYMPHOCYTES # BLD AUTO: 1.73 K/UL (ref 1–4.8)
LYMPHOCYTES NFR BLD: 28.8 % (ref 22–41)
MCH RBC QN AUTO: 28.3 PG (ref 27–33)
MCHC RBC AUTO-ENTMCNC: 33.2 G/DL (ref 33.7–35.3)
MCV RBC AUTO: 85.5 FL (ref 81.4–97.8)
MONOCYTES # BLD AUTO: 0.75 K/UL (ref 0–0.85)
MONOCYTES NFR BLD AUTO: 12.5 % (ref 0–13.4)
NEUTROPHILS # BLD AUTO: 3.15 K/UL (ref 1.82–7.42)
NEUTROPHILS NFR BLD: 52.6 % (ref 44–72)
NRBC # BLD AUTO: 0 K/UL
NRBC BLD-RTO: 0 /100 WBC
PLATELET # BLD AUTO: 157 K/UL (ref 164–446)
PMV BLD AUTO: 10.8 FL (ref 9–12.9)
POTASSIUM SERPL-SCNC: 3.5 MMOL/L (ref 3.6–5.5)
RBC # BLD AUTO: 4.48 M/UL (ref 4.7–6.1)
SODIUM SERPL-SCNC: 138 MMOL/L (ref 135–145)
WBC # BLD AUTO: 6 K/UL (ref 4.8–10.8)

## 2018-12-30 PROCEDURE — A9270 NON-COVERED ITEM OR SERVICE: HCPCS | Performed by: INTERNAL MEDICINE

## 2018-12-30 PROCEDURE — 700105 HCHG RX REV CODE 258: Performed by: HOSPITALIST

## 2018-12-30 PROCEDURE — 85025 COMPLETE CBC W/AUTO DIFF WBC: CPT

## 2018-12-30 PROCEDURE — 700102 HCHG RX REV CODE 250 W/ 637 OVERRIDE(OP): Performed by: HOSPITALIST

## 2018-12-30 PROCEDURE — G8979 MOBILITY GOAL STATUS: HCPCS | Mod: CI

## 2018-12-30 PROCEDURE — 99225 PR SUBSEQUENT OBSERVATION CARE,LEVEL II: CPT | Performed by: INTERNAL MEDICINE

## 2018-12-30 PROCEDURE — G8978 MOBILITY CURRENT STATUS: HCPCS | Mod: CJ

## 2018-12-30 PROCEDURE — 36415 COLL VENOUS BLD VENIPUNCTURE: CPT

## 2018-12-30 PROCEDURE — A9270 NON-COVERED ITEM OR SERVICE: HCPCS | Performed by: HOSPITALIST

## 2018-12-30 PROCEDURE — G0378 HOSPITAL OBSERVATION PER HR: HCPCS

## 2018-12-30 PROCEDURE — 97162 PT EVAL MOD COMPLEX 30 MIN: CPT

## 2018-12-30 PROCEDURE — 700101 HCHG RX REV CODE 250: Performed by: INTERNAL MEDICINE

## 2018-12-30 PROCEDURE — 700105 HCHG RX REV CODE 258

## 2018-12-30 PROCEDURE — 700102 HCHG RX REV CODE 250 W/ 637 OVERRIDE(OP): Performed by: INTERNAL MEDICINE

## 2018-12-30 PROCEDURE — 80048 BASIC METABOLIC PNL TOTAL CA: CPT

## 2018-12-30 RX ORDER — MECLIZINE HYDROCHLORIDE 25 MG/1
25 TABLET ORAL 3 TIMES DAILY
Status: DISCONTINUED | OUTPATIENT
Start: 2018-12-30 | End: 2019-01-01

## 2018-12-30 RX ORDER — POTASSIUM CHLORIDE 20 MEQ/1
40 TABLET, EXTENDED RELEASE ORAL ONCE
Status: COMPLETED | OUTPATIENT
Start: 2018-12-30 | End: 2018-12-30

## 2018-12-30 RX ORDER — SODIUM CHLORIDE 9 MG/ML
INJECTION, SOLUTION INTRAVENOUS
Status: COMPLETED
Start: 2018-12-30 | End: 2018-12-30

## 2018-12-30 RX ADMIN — OXYCODONE HYDROCHLORIDE 2.5 MG: 5 TABLET ORAL at 04:23

## 2018-12-30 RX ADMIN — MECLIZINE HYDROCHLORIDE 25 MG: 25 TABLET ORAL at 13:13

## 2018-12-30 RX ADMIN — PYRIDOSTIGMINE BROMIDE 90 MG: 60 TABLET ORAL at 17:53

## 2018-12-30 RX ADMIN — ACETAMINOPHEN 1000 MG: 500 TABLET ORAL at 13:21

## 2018-12-30 RX ADMIN — POTASSIUM CHLORIDE 40 MEQ: 1500 TABLET, EXTENDED RELEASE ORAL at 13:13

## 2018-12-30 RX ADMIN — RIVAROXABAN 20 MG: 20 TABLET, FILM COATED ORAL at 17:53

## 2018-12-30 RX ADMIN — MECLIZINE HYDROCHLORIDE 25 MG: 25 TABLET ORAL at 17:54

## 2018-12-30 RX ADMIN — CLINDAMYCIN IN 5 PERCENT DEXTROSE 600 MG: 12 INJECTION, SOLUTION INTRAVENOUS at 05:03

## 2018-12-30 RX ADMIN — SODIUM CHLORIDE: 9 INJECTION, SOLUTION INTRAVENOUS at 13:15

## 2018-12-30 RX ADMIN — CLINDAMYCIN IN 5 PERCENT DEXTROSE 600 MG: 12 INJECTION, SOLUTION INTRAVENOUS at 13:14

## 2018-12-30 RX ADMIN — MIDODRINE HYDROCHLORIDE 10 MG: 5 TABLET ORAL at 13:14

## 2018-12-30 RX ADMIN — PYRIDOSTIGMINE BROMIDE 90 MG: 60 TABLET ORAL at 13:14

## 2018-12-30 RX ADMIN — PRAVASTATIN SODIUM 20 MG: 20 TABLET ORAL at 17:54

## 2018-12-30 RX ADMIN — FLUOXETINE HYDROCHLORIDE 20 MG: 20 CAPSULE ORAL at 05:02

## 2018-12-30 RX ADMIN — PYRIDOSTIGMINE BROMIDE 90 MG: 60 TABLET ORAL at 05:01

## 2018-12-30 RX ADMIN — MIDODRINE HYDROCHLORIDE 10 MG: 5 TABLET ORAL at 05:03

## 2018-12-30 RX ADMIN — ACETAMINOPHEN 1000 MG: 500 TABLET ORAL at 17:53

## 2018-12-30 RX ADMIN — FLUDROCORTISONE ACETATE 0.3 MG: 0.1 TABLET ORAL at 05:02

## 2018-12-30 RX ADMIN — OXYCODONE HYDROCHLORIDE 2.5 MG: 5 TABLET ORAL at 21:21

## 2018-12-30 RX ADMIN — SODIUM CHLORIDE 500 ML: 9 INJECTION, SOLUTION INTRAVENOUS at 21:31

## 2018-12-30 RX ADMIN — CLINDAMYCIN IN 5 PERCENT DEXTROSE 600 MG: 12 INJECTION, SOLUTION INTRAVENOUS at 21:31

## 2018-12-30 RX ADMIN — AMIODARONE HYDROCHLORIDE 50 MG: 200 TABLET ORAL at 05:00

## 2018-12-30 RX ADMIN — MIDODRINE HYDROCHLORIDE 10 MG: 5 TABLET ORAL at 17:54

## 2018-12-30 ASSESSMENT — GAIT ASSESSMENTS
ASSISTIVE DEVICE: FRONT WHEEL WALKER
GAIT LEVEL OF ASSIST: CONTACT GUARD ASSIST
DISTANCE (FEET): 25
DEVIATION: BRADYKINETIC;SHUFFLED GAIT

## 2018-12-30 ASSESSMENT — ENCOUNTER SYMPTOMS
ORTHOPNEA: 0
TINGLING: 0
CHILLS: 0
NAUSEA: 1
PALPITATIONS: 0
DEPRESSION: 0
HEARTBURN: 0
BLURRED VISION: 1
COUGH: 0
DIZZINESS: 1
NECK PAIN: 0
HEMOPTYSIS: 0
BACK PAIN: 0
VOMITING: 0
HEADACHES: 0
WEIGHT LOSS: 0
SPUTUM PRODUCTION: 0
PHOTOPHOBIA: 0
DOUBLE VISION: 0
FEVER: 0
MYALGIAS: 0

## 2018-12-30 ASSESSMENT — PAIN SCALES - GENERAL
PAINLEVEL_OUTOF10: 0
PAINLEVEL_OUTOF10: 8
PAINLEVEL_OUTOF10: 6
PAINLEVEL_OUTOF10: 6
PAINLEVEL_OUTOF10: 8
PAINLEVEL_OUTOF10: 0
PAINLEVEL_OUTOF10: 0

## 2018-12-30 ASSESSMENT — COGNITIVE AND FUNCTIONAL STATUS - GENERAL
MOBILITY SCORE: 21
CLIMB 3 TO 5 STEPS WITH RAILING: A LITTLE
SUGGESTED CMS G CODE MODIFIER MOBILITY: CJ
STANDING UP FROM CHAIR USING ARMS: A LITTLE
WALKING IN HOSPITAL ROOM: A LITTLE

## 2018-12-30 ASSESSMENT — LIFESTYLE VARIABLES: SUBSTANCE_ABUSE: 0

## 2018-12-30 NOTE — THERAPY
"Physical Therapy Evaluation completed.   Bed Mobility:  Supine to Sit:  (Up in bedside chair prior to therapy)  Transfers: Sit to Stand: Contact Guard Assist  Gait: Level Of Assist: Contact Guard Assist with Front-Wheel Walker       Plan of Care: Will benefit from Physical Therapy 3 times per week  Discharge Recommendations: Equipment: Will Continue to Assess for Equipment Needs. Post-acute therapy Discharge to home with outpatient or home health for additional skilled therapy services.    Patient demonstrates decreased functional mobility due to LE weakness and suddent feelings of lightheadedness when standing/ambulating; unsteady gait with FWW - distance limited by lightheadedness; will benefit from skilled acute physical therapy services to address functional deficits and improve independence    See \"Rehab Therapy-Acute\" Patient Summary Report for complete documentation.     "

## 2018-12-30 NOTE — PROGRESS NOTES
Hospital Medicine Daily Progress Note    Date of Service  12/30/2018    Chief Complaint  53-year-old male with history of CHF, paroxysmal A. fib, diabetes neuropathy, dyslipidemia, hypertension, noncompliance, persistent orthostatic dizziness/hypotension, presented with complaints of orthostatic syncopal and dizziness, back pain.  Found to have a L1 fracture from ground-level fall.  Neurosurgery consulted  Patient started on antibiotic for right hand cellulitis      Interval Problem Update  Patient blood pressure 150/90.  He is sitting up and continue complaining dizziness.  Today he feels like room is spinning around.  We will try meclizine.  Will order MRI of the brain.  CT of the head on admission as well as carotid Doppler unremarkable  CT of the hand on the right showed swelling, but no abscess  Telemetry did not reveal any significant abnormalities.  In sinus rhythm, heart rate 57-77  PT OT evaluated and recommended outpatient physical therapy    Consultants/Specialty  None    Code Status  Full code    Disposition  To be discussed    Review of Systems  Review of Systems   Constitutional: Negative for chills, fever and weight loss.   HENT: Negative for ear pain, hearing loss and tinnitus.    Eyes: Positive for blurred vision. Negative for double vision and photophobia.   Respiratory: Negative for cough, hemoptysis and sputum production.    Cardiovascular: Negative for chest pain, palpitations and orthopnea.   Gastrointestinal: Positive for nausea. Negative for heartburn and vomiting.   Genitourinary: Negative for dysuria, frequency and urgency.   Musculoskeletal: Negative for back pain, myalgias and neck pain.   Skin: Negative for itching and rash.   Neurological: Positive for dizziness. Negative for tingling and headaches.   Psychiatric/Behavioral: Negative for depression, substance abuse and suicidal ideas.        Physical Exam  Temp:  [36.3 °C (97.4 °F)-36.9 °C (98.4 °F)] 36.9 °C (98.4 °F)  Pulse:  [50-72]  70  Resp:  [16-18] 18  BP: (119-160)/(60-91) 140/72    Physical Exam   Constitutional: He is oriented to person, place, and time. He appears well-developed.   HENT:   Head: Normocephalic and atraumatic.   Eyes: Pupils are equal, round, and reactive to light. EOM are normal.   Cardiovascular: Normal rate and regular rhythm.    Pulmonary/Chest: Effort normal and breath sounds normal. No respiratory distress.   Abdominal: He exhibits no distension.   Musculoskeletal: Normal range of motion. He exhibits edema.   Neurological: He is alert and oriented to person, place, and time. He displays tremor. Coordination abnormal.   Positional tremor   Skin: Skin is warm and dry.       Fluids    Intake/Output Summary (Last 24 hours) at 12/30/18 1456  Last data filed at 12/30/18 0440   Gross per 24 hour   Intake          1212.68 ml   Output              350 ml   Net           862.68 ml       Laboratory  Recent Labs      12/28/18   1845  12/30/18   0445   WBC  7.2  6.0   RBC  4.96  4.48*   HEMOGLOBIN  14.0  12.7*   HEMATOCRIT  41.4*  38.3*   MCV  83.5  85.5   MCH  28.2  28.3   MCHC  33.8  33.2*   RDW  42.5  44.2   PLATELETCT  160*  157*   MPV  10.3  10.8     Recent Labs      12/28/18   1845  12/30/18   0445   SODIUM  138  138   POTASSIUM  3.6  3.5*   CHLORIDE  102  104   CO2  27  27   GLUCOSE  113*  92   BUN  16  20   CREATININE  0.57  0.71   CALCIUM  9.2  8.5                   Imaging  US-EXTREMITY VENOUS LOWER BILAT   Final Result      CT-HAND WITH RIGHT   Final Result      1.  Edema in the dorsum of the forearm and hand without a discrete fluid collection to suggest abscess is identified.      US-CAROTID DOPPLER BILAT   Final Result      DX-LUMBAR SPINE-2 OR 3 VIEWS   Final Result         Stable compression deformity in the L1 vertebral body.      CT-CTA CHEST PULMONARY ARTERY W/ RECONS   Final Result         1. No CT evidence of pulmonary embolism.      2. Old right rib fractures.      3. Cholelithiasis. Enlarged spleen with  granulomas.      4. Mild superior endplate compression deformity of L1 could be acute. Correlate with point tenderness. No retropulsion. No malalignment.      CT-CSPINE WITHOUT PLUS RECONS   Final Result         1. No acute fracture from C1 through T3 is visualized.         CT-HEAD W/O   Final Result         1. No acute intracranial abnormality. No evidence of acute intracranial hemorrhage or mass lesion.               DX-THORACIC SPINE-WITH SWIMMERS VIEW   Final Result      No acute thoracic spine fracture or subluxation identified.      DX-CHEST-PORTABLE (1 VIEW)   Final Result      No acute cardiac or pulmonary abnormality is noted.      MR-BRAIN-W/O    (Results Pending)        Assessment/Plan  * Syncope- (present on admission)   Assessment & Plan    Likely due to orthostasis.    CT of the head unremarkable  Carotid Doppler did not reveal significant stenosis  Given vertigo-like symptoms today, poor coordination and reported blurred vision, will order MRI of the brain to rule out organic abnormality     Cellulitis of finger of right hand   Assessment & Plan    CT of the right hand did not show abscess.  Swelling, erythema, tenderness to palpation.  Patient started on clindamycin IV.  Continue with antibiotic, reassess day by day     Lumbar compression fracture (HCC)   Assessment & Plan    Pain management as needed.    Neurosurgery recommended TLSO brace.   PT OT recommended home health.  Referral will be placed     Orthostatic hypotension- (present on admission)   Assessment & Plan    Possibly secondary to dysautonomia secondary to diabetes.  midodrine     Chronic diastolic heart failure (HCC)- (present on admission)   Assessment & Plan    Without current exacerbation.    Chronic lower extremity edema     Venous stasis dermatitis of both lower extremities- (present on admission)   Assessment & Plan    Monitor.      Bilateral leg edema- (present on admission)   Assessment & Plan    Appears to have chronic  edematous changes.  Monitor.  Compression stockings should help with orthostatic dizziness as well     Essential hypertension- (present on admission)   Assessment & Plan    Controlled with current medication regimen.    He is on amiodarone.  Lisinopril and Lasix was not started on admission.  Blood pressure is fairly controlled.  We will consider restarting home blood pressure medication lisinopril, as well as Lasix if blood pressure goes up     Mixed hyperlipidemia- (present on admission)   Assessment & Plan    Continue current therapy           VTE prophylaxis: Heparin

## 2018-12-30 NOTE — PROGRESS NOTES
Monitor Summary:   0.28/0.10/0.42     Rhythm:  SB-SR with first degree heart block 57-77         Ectopy: none

## 2018-12-30 NOTE — PROGRESS NOTES
Upright xrays stable, LSO when out of bed, followup [facility] as mentioned in consult note with repeat upright lumbar xrays, call with questions.

## 2018-12-30 NOTE — PROGRESS NOTES
Bedside report received from Augustin PÉREZ. Patient's chart and MAR reviewed. 12 hour chart check complete. Pt is awake in bed. Pt is AOx4. Patient was updated on plan of care for the day. Questions answered and concerns addressed.  Pt denies any additional needs at this time. White board updated. Call light and personal belongings within reach, bed in lowest position.

## 2018-12-30 NOTE — CARE PLAN
Problem: Safety  Goal: Will remain free from injury  Outcome: PROGRESSING AS EXPECTED  Fall precautions in place. Bed in lowest position. Non-skid socks in place. Personal possessions within reach. Mobility sign on door. Bed-alarm on. Call light within reach. Pt educated regarding fall prevention and states understanding.     Problem: Knowledge Deficit  Goal: Knowledge of disease process/condition, treatment plan, diagnostic tests, and medications will improve  Outcome: PROGRESSING AS EXPECTED  Pt educated regarding plan of care and medications. All questions answered.

## 2018-12-31 ENCOUNTER — APPOINTMENT (OUTPATIENT)
Dept: RADIOLOGY | Facility: MEDICAL CENTER | Age: 53
End: 2018-12-31
Attending: INTERNAL MEDICINE
Payer: MEDICAID

## 2018-12-31 ENCOUNTER — HOME HEALTH ADMISSION (OUTPATIENT)
Dept: HOME HEALTH SERVICES | Facility: HOME HEALTHCARE | Age: 53
End: 2018-12-31
Payer: MEDICAID

## 2018-12-31 LAB
ANION GAP SERPL CALC-SCNC: 3 MMOL/L (ref 0–11.9)
BASOPHILS # BLD AUTO: 0.5 % (ref 0–1.8)
BASOPHILS # BLD: 0.03 K/UL (ref 0–0.12)
BUN SERPL-MCNC: 23 MG/DL (ref 8–22)
CALCIUM SERPL-MCNC: 8.9 MG/DL (ref 8.5–10.5)
CHLORIDE SERPL-SCNC: 103 MMOL/L (ref 96–112)
CO2 SERPL-SCNC: 28 MMOL/L (ref 20–33)
CREAT SERPL-MCNC: 0.75 MG/DL (ref 0.5–1.4)
EOSINOPHIL # BLD AUTO: 0.42 K/UL (ref 0–0.51)
EOSINOPHIL NFR BLD: 6.7 % (ref 0–6.9)
ERYTHROCYTE [DISTWIDTH] IN BLOOD BY AUTOMATED COUNT: 44.1 FL (ref 35.9–50)
GLUCOSE SERPL-MCNC: 112 MG/DL (ref 65–99)
HCT VFR BLD AUTO: 38 % (ref 42–52)
HGB BLD-MCNC: 12.5 G/DL (ref 14–18)
IMM GRANULOCYTES # BLD AUTO: 0.02 K/UL (ref 0–0.11)
IMM GRANULOCYTES NFR BLD AUTO: 0.3 % (ref 0–0.9)
LYMPHOCYTES # BLD AUTO: 2.36 K/UL (ref 1–4.8)
LYMPHOCYTES NFR BLD: 37.6 % (ref 22–41)
MAGNESIUM SERPL-MCNC: 1.6 MG/DL (ref 1.5–2.5)
MCH RBC QN AUTO: 28.2 PG (ref 27–33)
MCHC RBC AUTO-ENTMCNC: 32.9 G/DL (ref 33.7–35.3)
MCV RBC AUTO: 85.6 FL (ref 81.4–97.8)
MONOCYTES # BLD AUTO: 0.67 K/UL (ref 0–0.85)
MONOCYTES NFR BLD AUTO: 10.7 % (ref 0–13.4)
NEUTROPHILS # BLD AUTO: 2.77 K/UL (ref 1.82–7.42)
NEUTROPHILS NFR BLD: 44.2 % (ref 44–72)
NRBC # BLD AUTO: 0 K/UL
NRBC BLD-RTO: 0 /100 WBC
PHOSPHATE SERPL-MCNC: 3.4 MG/DL (ref 2.5–4.5)
PLATELET # BLD AUTO: 158 K/UL (ref 164–446)
PMV BLD AUTO: 10.4 FL (ref 9–12.9)
POTASSIUM SERPL-SCNC: 3.9 MMOL/L (ref 3.6–5.5)
RBC # BLD AUTO: 4.44 M/UL (ref 4.7–6.1)
SODIUM SERPL-SCNC: 134 MMOL/L (ref 135–145)
T4 FREE SERPL-MCNC: 1.07 NG/DL (ref 0.53–1.43)
TSH SERPL DL<=0.005 MIU/L-ACNC: 4.29 UIU/ML (ref 0.38–5.33)
WBC # BLD AUTO: 6.3 K/UL (ref 4.8–10.8)

## 2018-12-31 PROCEDURE — 83735 ASSAY OF MAGNESIUM: CPT

## 2018-12-31 PROCEDURE — 84443 ASSAY THYROID STIM HORMONE: CPT

## 2018-12-31 PROCEDURE — A9270 NON-COVERED ITEM OR SERVICE: HCPCS | Performed by: INTERNAL MEDICINE

## 2018-12-31 PROCEDURE — 99225 PR SUBSEQUENT OBSERVATION CARE,LEVEL II: CPT | Performed by: INTERNAL MEDICINE

## 2018-12-31 PROCEDURE — 36415 COLL VENOUS BLD VENIPUNCTURE: CPT

## 2018-12-31 PROCEDURE — 700101 HCHG RX REV CODE 250: Performed by: INTERNAL MEDICINE

## 2018-12-31 PROCEDURE — 70551 MRI BRAIN STEM W/O DYE: CPT

## 2018-12-31 PROCEDURE — 700102 HCHG RX REV CODE 250 W/ 637 OVERRIDE(OP): Performed by: HOSPITALIST

## 2018-12-31 PROCEDURE — A9270 NON-COVERED ITEM OR SERVICE: HCPCS | Performed by: HOSPITALIST

## 2018-12-31 PROCEDURE — 84100 ASSAY OF PHOSPHORUS: CPT

## 2018-12-31 PROCEDURE — 80048 BASIC METABOLIC PNL TOTAL CA: CPT

## 2018-12-31 PROCEDURE — 700102 HCHG RX REV CODE 250 W/ 637 OVERRIDE(OP): Performed by: INTERNAL MEDICINE

## 2018-12-31 PROCEDURE — 84439 ASSAY OF FREE THYROXINE: CPT

## 2018-12-31 PROCEDURE — 85025 COMPLETE CBC W/AUTO DIFF WBC: CPT

## 2018-12-31 PROCEDURE — G0378 HOSPITAL OBSERVATION PER HR: HCPCS

## 2018-12-31 RX ORDER — MIDODRINE HYDROCHLORIDE 5 MG/1
5 TABLET ORAL 3 TIMES DAILY
Status: DISCONTINUED | OUTPATIENT
Start: 2018-12-31 | End: 2019-01-01

## 2018-12-31 RX ORDER — LISINOPRIL 5 MG/1
5 TABLET ORAL DAILY
Status: DISCONTINUED | OUTPATIENT
Start: 2018-12-31 | End: 2019-01-05

## 2018-12-31 RX ORDER — NYSTATIN 100000 [USP'U]/G
POWDER TOPICAL 2 TIMES DAILY
Status: DISCONTINUED | OUTPATIENT
Start: 2018-12-31 | End: 2019-01-28

## 2018-12-31 RX ORDER — HYDRALAZINE HYDROCHLORIDE 20 MG/ML
10 INJECTION INTRAMUSCULAR; INTRAVENOUS EVERY 6 HOURS PRN
Status: DISCONTINUED | OUTPATIENT
Start: 2018-12-31 | End: 2019-01-13

## 2018-12-31 RX ORDER — CLINDAMYCIN HYDROCHLORIDE 150 MG/1
300 CAPSULE ORAL 4 TIMES DAILY
Status: DISCONTINUED | OUTPATIENT
Start: 2018-12-31 | End: 2019-01-01

## 2018-12-31 RX ADMIN — MIDODRINE HYDROCHLORIDE 5 MG: 5 TABLET ORAL at 17:50

## 2018-12-31 RX ADMIN — NYSTATIN: 100000 POWDER TOPICAL at 12:07

## 2018-12-31 RX ADMIN — MECLIZINE HYDROCHLORIDE 25 MG: 25 TABLET ORAL at 12:05

## 2018-12-31 RX ADMIN — FLUOXETINE HYDROCHLORIDE 20 MG: 20 CAPSULE ORAL at 05:14

## 2018-12-31 RX ADMIN — PYRIDOSTIGMINE BROMIDE 90 MG: 60 TABLET ORAL at 12:04

## 2018-12-31 RX ADMIN — PYRIDOSTIGMINE BROMIDE 90 MG: 60 TABLET ORAL at 17:49

## 2018-12-31 RX ADMIN — AMIODARONE HYDROCHLORIDE 50 MG: 200 TABLET ORAL at 05:13

## 2018-12-31 RX ADMIN — MIDODRINE HYDROCHLORIDE 10 MG: 5 TABLET ORAL at 12:07

## 2018-12-31 RX ADMIN — OXYCODONE HYDROCHLORIDE 2.5 MG: 5 TABLET ORAL at 05:12

## 2018-12-31 RX ADMIN — CLINDAMYCIN HYDROCHLORIDE 300 MG: 150 CAPSULE ORAL at 22:25

## 2018-12-31 RX ADMIN — NYSTATIN: 100000 POWDER TOPICAL at 17:53

## 2018-12-31 RX ADMIN — FLUDROCORTISONE ACETATE 0.3 MG: 0.1 TABLET ORAL at 05:13

## 2018-12-31 RX ADMIN — ACETAMINOPHEN 1000 MG: 500 TABLET ORAL at 17:50

## 2018-12-31 RX ADMIN — CLINDAMYCIN HYDROCHLORIDE 300 MG: 150 CAPSULE ORAL at 13:00

## 2018-12-31 RX ADMIN — RIVAROXABAN 20 MG: 20 TABLET, FILM COATED ORAL at 17:50

## 2018-12-31 RX ADMIN — CLINDAMYCIN HYDROCHLORIDE 300 MG: 150 CAPSULE ORAL at 17:50

## 2018-12-31 RX ADMIN — MECLIZINE HYDROCHLORIDE 25 MG: 25 TABLET ORAL at 17:48

## 2018-12-31 RX ADMIN — LISINOPRIL 5 MG: 5 TABLET ORAL at 17:50

## 2018-12-31 RX ADMIN — MIDODRINE HYDROCHLORIDE 10 MG: 5 TABLET ORAL at 06:00

## 2018-12-31 RX ADMIN — MECLIZINE HYDROCHLORIDE 25 MG: 25 TABLET ORAL at 05:14

## 2018-12-31 RX ADMIN — PYRIDOSTIGMINE BROMIDE 90 MG: 60 TABLET ORAL at 05:16

## 2018-12-31 RX ADMIN — ACETAMINOPHEN 1000 MG: 500 TABLET ORAL at 12:06

## 2018-12-31 RX ADMIN — Medication 400 MG: at 08:28

## 2018-12-31 RX ADMIN — PRAVASTATIN SODIUM 20 MG: 20 TABLET ORAL at 17:50

## 2018-12-31 RX ADMIN — CLINDAMYCIN IN 5 PERCENT DEXTROSE 600 MG: 12 INJECTION, SOLUTION INTRAVENOUS at 05:17

## 2018-12-31 ASSESSMENT — PAIN SCALES - GENERAL
PAINLEVEL_OUTOF10: 0
PAINLEVEL_OUTOF10: 8
PAINLEVEL_OUTOF10: 2

## 2018-12-31 ASSESSMENT — ENCOUNTER SYMPTOMS
ORTHOPNEA: 0
PHOTOPHOBIA: 0
HEADACHES: 0
WEIGHT LOSS: 0
TINGLING: 0
NECK PAIN: 0
BLURRED VISION: 1
MYALGIAS: 0
COUGH: 0
VOMITING: 0
BACK PAIN: 0
HEARTBURN: 0
FEVER: 0
PALPITATIONS: 0
DEPRESSION: 0
DOUBLE VISION: 0
NAUSEA: 1
SPUTUM PRODUCTION: 0
DIZZINESS: 1
HEMOPTYSIS: 0
CHILLS: 0

## 2018-12-31 ASSESSMENT — LIFESTYLE VARIABLES: SUBSTANCE_ABUSE: 0

## 2018-12-31 NOTE — DISCHARGE PLANNING
"Patient LACE Score = 86  - Previous admissions past 6 months:   Orthostatic hypotension, Fall x2, Chest pain x2, LE swelling x2, Prox A-fib x2, Afib with RVR x2, Pseudoseizure, Syncope     Pt has back support on while sitting in the chair, pt reports that he has not had any lightheadedness or dizziness in the last day. Pt is to follow up with [facility] clinic in about 4 weeks; clinic will monitor patient over next 3 months for worsening compression fracture, pt is not a surgical candidate at this time. PT recomends home with home health, awaiting OT evaluation. Pt confirms that with significant other and friends, there is always someone around to help assist him if needed. Pt will need detailed medication education since meds will be changed and he \"doesn't know what he is taking or how often to take it because it all changed\".       Follow Up:  OT evaluation  Detailed medication education  Follow up appointment with [facility]  "

## 2018-12-31 NOTE — FACE TO FACE
Face to Face Supporting Documentation - Home Health    The encounter with this patient was in whole or in part the primary reason for home health admission.    Date of encounter:   Patient:                    MRN:                       YOB: 2020  Sebastián Castro  [id number]  1965     Home health to see patient for:  Skilled Nursing care for assessment, interventions & education, Physical Therapy evaluation and treatment and Occupational therapy evaluation and treatment    Skilled need for:  Recent Deterioration of Health Status Syncope.  Dizziness    Skilled nursing interventions to include:  Comment: Assessment and intervention.  Physical therapy    Homebound status evidenced by:  Needs the assistance of another person in order to leave the home. Leaving home requires a considerable and taxing effort. There is a normal inability to leave the home.    Community Physician to provide follow up care: German Hinton M.D.     Optional Interventions? No      I certify the face to face encounter for this home health care referral meets the CMS requirements and the encounter/clinical assessment with the patient was, in whole, or in part, for the medical condition(s) listed above, which is the primary reason for home health care. Based on my clinical findings: the service(s) are medically necessary, support the need for home health care, and the homebound criteria are met.  I certify that this patient has had a face to face encounter by myself.  Colt Denton M.D. - NPI: [id number]

## 2018-12-31 NOTE — PROGRESS NOTES
Monitor Summary:   0.26/0.12/0.46     Rhythm: First degree heart block SB-SR 50-68         Ectopy: none

## 2018-12-31 NOTE — CARE PLAN
Problem: Pain Management  Goal: Pain level will decrease to patient's comfort goal  Outcome: PROGRESSING AS EXPECTED  Pt assessed for pain regularly and medicated PRN per MAR.       Problem: Safety  Goal: Will remain free from injury  Outcome: PROGRESSING AS EXPECTED  Patient educated about risk of falls and reasoning for use of tread socks, calling for help, and bed alarms.

## 2018-12-31 NOTE — DISCHARGE PLANNING
Anticipated Discharge Disposition: Home with HH    Action: LSW spoke with pt at bedside to discuss HH choice. Pt made choice for [facility] and stated he has been on service with them in the past. LSW faxed choice form to [facility].     Barriers to Discharge: None    Plan: Awaiting HH acceptance.

## 2018-12-31 NOTE — DISCHARGE PLANNING
ATTN: Case Management  RE: Referral for Home Health    As of 12/31/18, we have accepted the Home Health referral for the patient listed above.    A [facility] clinician will be out to see the patient within 48 hours. If you have any questions or concerns regarding the patient’s transition to Home Health, please do not hesitate to contact us at [phone].      We look forward to collaborating with you,  [facility] Home Health Team

## 2018-12-31 NOTE — DISCHARGE PLANNING
Received Choice form at [phone]  Agency/Facility Name: [facility]  Referral sent per Choice form @ [phone]

## 2019-01-01 PROCEDURE — 99225 PR SUBSEQUENT OBSERVATION CARE,LEVEL II: CPT | Performed by: INTERNAL MEDICINE

## 2019-01-01 PROCEDURE — G0378 HOSPITAL OBSERVATION PER HR: HCPCS

## 2019-01-01 PROCEDURE — A9270 NON-COVERED ITEM OR SERVICE: HCPCS | Performed by: INTERNAL MEDICINE

## 2019-01-01 PROCEDURE — 700102 HCHG RX REV CODE 250 W/ 637 OVERRIDE(OP): Performed by: INTERNAL MEDICINE

## 2019-01-01 PROCEDURE — 97530 THERAPEUTIC ACTIVITIES: CPT

## 2019-01-01 PROCEDURE — 700102 HCHG RX REV CODE 250 W/ 637 OVERRIDE(OP): Performed by: HOSPITALIST

## 2019-01-01 PROCEDURE — 97165 OT EVAL LOW COMPLEX 30 MIN: CPT

## 2019-01-01 PROCEDURE — 97116 GAIT TRAINING THERAPY: CPT | Mod: XU

## 2019-01-01 PROCEDURE — A9270 NON-COVERED ITEM OR SERVICE: HCPCS | Performed by: HOSPITALIST

## 2019-01-01 PROCEDURE — 97535 SELF CARE MNGMENT TRAINING: CPT | Mod: XE

## 2019-01-01 RX ORDER — MIDODRINE HYDROCHLORIDE 10 MG/1
5 TABLET ORAL 3 TIMES DAILY
Qty: 60 TAB | Refills: 0 | Status: SHIPPED | OUTPATIENT
Start: 2019-01-01 | End: 2019-01-02

## 2019-01-01 RX ORDER — CLINDAMYCIN HYDROCHLORIDE 150 MG/1
300 CAPSULE ORAL 4 TIMES DAILY
Status: COMPLETED | OUTPATIENT
Start: 2019-01-01 | End: 2019-01-03

## 2019-01-01 RX ORDER — PYRIDOSTIGMINE BROMIDE 60 MG/1
120 TABLET ORAL 3 TIMES DAILY
Status: DISCONTINUED | OUTPATIENT
Start: 2019-01-01 | End: 2019-01-07

## 2019-01-01 RX ORDER — MIDODRINE HYDROCHLORIDE 5 MG/1
10 TABLET ORAL 3 TIMES DAILY
Status: DISCONTINUED | OUTPATIENT
Start: 2019-01-02 | End: 2019-01-04

## 2019-01-01 RX ORDER — CLINDAMYCIN HYDROCHLORIDE 300 MG/1
300 CAPSULE ORAL 4 TIMES DAILY
Qty: 20 CAP | Refills: 0 | Status: SHIPPED | OUTPATIENT
Start: 2019-01-01 | End: 2019-01-02

## 2019-01-01 RX ORDER — ASCORBIC ACID 500 MG
500 TABLET ORAL DAILY
Status: DISCONTINUED | OUTPATIENT
Start: 2019-01-02 | End: 2019-02-27 | Stop reason: HOSPADM

## 2019-01-01 RX ADMIN — PRAVASTATIN SODIUM 20 MG: 20 TABLET ORAL at 17:21

## 2019-01-01 RX ADMIN — PYRIDOSTIGMINE BROMIDE 90 MG: 60 TABLET ORAL at 05:00

## 2019-01-01 RX ADMIN — NYSTATIN: 100000 POWDER TOPICAL at 18:00

## 2019-01-01 RX ADMIN — MIDODRINE HYDROCHLORIDE 5 MG: 5 TABLET ORAL at 05:04

## 2019-01-01 RX ADMIN — LISINOPRIL 5 MG: 5 TABLET ORAL at 05:05

## 2019-01-01 RX ADMIN — MECLIZINE HYDROCHLORIDE 25 MG: 25 TABLET ORAL at 05:01

## 2019-01-01 RX ADMIN — CLINDAMYCIN HYDROCHLORIDE 300 MG: 150 CAPSULE ORAL at 20:32

## 2019-01-01 RX ADMIN — NYSTATIN: 100000 POWDER TOPICAL at 05:06

## 2019-01-01 RX ADMIN — PYRIDOSTIGMINE BROMIDE 90 MG: 60 TABLET ORAL at 11:29

## 2019-01-01 RX ADMIN — MECLIZINE HYDROCHLORIDE 25 MG: 25 TABLET ORAL at 17:22

## 2019-01-01 RX ADMIN — FLUOXETINE HYDROCHLORIDE 20 MG: 20 CAPSULE ORAL at 05:01

## 2019-01-01 RX ADMIN — ACETAMINOPHEN 1000 MG: 500 TABLET ORAL at 05:04

## 2019-01-01 RX ADMIN — FLUDROCORTISONE ACETATE 0.3 MG: 0.1 TABLET ORAL at 05:03

## 2019-01-01 RX ADMIN — CLINDAMYCIN HYDROCHLORIDE 300 MG: 150 CAPSULE ORAL at 17:21

## 2019-01-01 RX ADMIN — MIDODRINE HYDROCHLORIDE 5 MG: 5 TABLET ORAL at 17:22

## 2019-01-01 RX ADMIN — MIDODRINE HYDROCHLORIDE 5 MG: 5 TABLET ORAL at 11:31

## 2019-01-01 RX ADMIN — PYRIDOSTIGMINE BROMIDE 120 MG: 60 TABLET ORAL at 17:22

## 2019-01-01 RX ADMIN — ACETAMINOPHEN 1000 MG: 500 TABLET ORAL at 23:44

## 2019-01-01 RX ADMIN — ACETAMINOPHEN 1000 MG: 500 TABLET ORAL at 11:31

## 2019-01-01 RX ADMIN — MECLIZINE HYDROCHLORIDE 25 MG: 25 TABLET ORAL at 11:30

## 2019-01-01 RX ADMIN — AMIODARONE HYDROCHLORIDE 50 MG: 200 TABLET ORAL at 05:05

## 2019-01-01 RX ADMIN — ACETAMINOPHEN 1000 MG: 500 TABLET ORAL at 17:21

## 2019-01-01 RX ADMIN — RIVAROXABAN 20 MG: 20 TABLET, FILM COATED ORAL at 17:22

## 2019-01-01 RX ADMIN — CLINDAMYCIN HYDROCHLORIDE 300 MG: 150 CAPSULE ORAL at 08:14

## 2019-01-01 RX ADMIN — CLINDAMYCIN HYDROCHLORIDE 300 MG: 150 CAPSULE ORAL at 12:11

## 2019-01-01 RX ADMIN — Medication 400 MG: at 05:02

## 2019-01-01 ASSESSMENT — ENCOUNTER SYMPTOMS
MYALGIAS: 0
COUGH: 0
CHILLS: 0
VOMITING: 0
ORTHOPNEA: 0
DOUBLE VISION: 0
NECK PAIN: 0
PALPITATIONS: 0
BACK PAIN: 0
TINGLING: 0
BLURRED VISION: 0
HEARTBURN: 0
SPUTUM PRODUCTION: 0
DIZZINESS: 1
HEADACHES: 0
NAUSEA: 0
FEVER: 0
DEPRESSION: 0
HEMOPTYSIS: 0
WEIGHT LOSS: 0
PHOTOPHOBIA: 0

## 2019-01-01 ASSESSMENT — COGNITIVE AND FUNCTIONAL STATUS - GENERAL
MOBILITY SCORE: 18
MOVING FROM LYING ON BACK TO SITTING ON SIDE OF FLAT BED: A LITTLE
SUGGESTED CMS G CODE MODIFIER MOBILITY: CK
WALKING IN HOSPITAL ROOM: A LITTLE
MOVING TO AND FROM BED TO CHAIR: A LITTLE
CLIMB 3 TO 5 STEPS WITH RAILING: A LITTLE
HELP NEEDED FOR BATHING: A LITTLE
SUGGESTED CMS G CODE MODIFIER DAILY ACTIVITY: CJ
TOILETING: A LITTLE
DAILY ACTIVITIY SCORE: 20
TURNING FROM BACK TO SIDE WHILE IN FLAT BAD: A LITTLE
STANDING UP FROM CHAIR USING ARMS: A LITTLE
DRESSING REGULAR UPPER BODY CLOTHING: A LITTLE
DRESSING REGULAR LOWER BODY CLOTHING: A LITTLE

## 2019-01-01 ASSESSMENT — ACTIVITIES OF DAILY LIVING (ADL): TOILETING: INDEPENDENT

## 2019-01-01 ASSESSMENT — GAIT ASSESSMENTS
DISTANCE (FEET): 30
ASSISTIVE DEVICE: FRONT WHEEL WALKER
GAIT LEVEL OF ASSIST: CONTACT GUARD ASSIST
DEVIATION: BRADYKINETIC;SHUFFLED GAIT

## 2019-01-01 ASSESSMENT — PAIN SCALES - GENERAL
PAINLEVEL_OUTOF10: 0
PAINLEVEL_OUTOF10: 2

## 2019-01-01 ASSESSMENT — LIFESTYLE VARIABLES: SUBSTANCE_ABUSE: 0

## 2019-01-01 NOTE — PROGRESS NOTES
[facility] Medicine Daily Progress Note    Date of Service  12/31/2018    Chief Complaint  53-year-old male with history of CHF, paroxysmal A. fib, diabetes neuropathy, dyslipidemia, hypertension, noncompliance, persistent orthostatic dizziness/hypotension, presented with complaints of orthostatic syncopal and dizziness, back pain.  Found to have a L1 fracture from ground-level fall.  Neurosurgery consulted  Patient started on antibiotic for right hand cellulitis      Interval Problem Update  Blood pressure is trending up.  When standing up, blood pressure drops to below 100, with associated lightheadedness.  Will restart lisinopril.  we will decrease dose of Midodrinr.  As needed hydralazine ordered.  Patient states that the dizziness has been improving.  MRI of the brain pending.  PT recommended home health.  Referral placed.    Consultants/Specialty  None    Code Status  Full code    Disposition  To be discussed    Review of Systems  Review of Systems   Constitutional: Negative for chills, fever and weight loss.   HENT: Negative for ear pain, hearing loss and tinnitus.    Eyes: Positive for blurred vision. Negative for double vision and photophobia.   Respiratory: Negative for cough, hemoptysis and sputum production.    Cardiovascular: Negative for chest pain, palpitations and orthopnea.   Gastrointestinal: Positive for nausea. Negative for heartburn and vomiting.   Genitourinary: Negative for dysuria, frequency and urgency.   Musculoskeletal: Negative for back pain, myalgias and neck pain.   Skin: Negative for itching and rash.   Neurological: Positive for dizziness. Negative for tingling and headaches.   Psychiatric/Behavioral: Negative for depression, substance abuse and suicidal ideas.        Physical Exam  Temp:  [36.4 °C (97.5 °F)-37.1 °C (98.8 °F)] 37.1 °C (98.7 °F)  Pulse:  [53-71] 64  Resp:  [16-18] 18  BP: (143-170)/(70-90) 165/86    Physical Exam   Constitutional: He is oriented to person, place, and  time. He appears well-developed.   HENT:   Head: Normocephalic and atraumatic.   Eyes: Pupils are equal, round, and reactive to light. EOM are normal.   Cardiovascular: Normal rate and regular rhythm.    Pulmonary/Chest: Effort normal and breath sounds normal. No respiratory distress.   Abdominal: He exhibits no distension.   Musculoskeletal: Normal range of motion. He exhibits edema.   Neurological: He is alert and oriented to person, place, and time. He displays tremor. Coordination abnormal.   Positional tremor   Skin: Skin is warm and dry.       Fluids    Intake/Output Summary (Last 24 hours) at 12/31/18 1644  Last data filed at 12/31/18 1550   Gross per 24 hour   Intake              640 ml   Output             3500 ml   Net            -2860 ml       Laboratory  Recent Labs      12/28/18   1845 12/30/18   0445  12/31/18   0101   WBC  7.2  6.0  6.3   RBC  4.96  4.48*  4.44*   HEMOGLOBIN  14.0  12.7*  12.5*   HEMATOCRIT  41.4*  38.3*  38.0*   MCV  83.5  85.5  85.6   MCH  28.2  28.3  28.2   MCHC  33.8  33.2*  32.9*   RDW  42.5  44.2  44.1   PLATELETCT  160*  157*  158*   MPV  10.3  10.8  10.4     Recent Labs      12/28/18   1845  12/30/18   0445  12/31/18   0101   SODIUM  138  138  134*   POTASSIUM  3.6  3.5*  3.9   CHLORIDE  102  104  103   CO2  27  27  28   GLUCOSE  113*  92  112*   BUN  16  20  23*   CREATININE  0.57  0.71  0.75   CALCIUM  9.2  8.5  8.9                   Imaging  US-EXTREMITY VENOUS LOWER BILAT   Final Result      CT-HAND WITH RIGHT   Final Result      1.  Edema in the dorsum of the forearm and hand without a discrete fluid collection to suggest abscess is identified.      US-CAROTID DOPPLER BILAT   Final Result      DX-LUMBAR SPINE-2 OR 3 VIEWS   Final Result         Stable compression deformity in the L1 vertebral body.      CT-CTA CHEST PULMONARY ARTERY W/ RECONS   Final Result         1. No CT evidence of pulmonary embolism.      2. Old right rib fractures.      3. Cholelithiasis. Enlarged  spleen with granulomas.      4. Mild superior endplate compression deformity of L1 could be acute. Correlate with point tenderness. No retropulsion. No malalignment.      CT-CSPINE WITHOUT PLUS RECONS   Final Result         1. No acute fracture from C1 through T3 is visualized.         CT-HEAD W/O   Final Result         1. No acute intracranial abnormality. No evidence of acute intracranial hemorrhage or mass lesion.               DX-THORACIC SPINE-WITH SWIMMERS VIEW   Final Result      No acute thoracic spine fracture or subluxation identified.      DX-CHEST-PORTABLE (1 VIEW)   Final Result      No acute cardiac or pulmonary abnormality is noted.      MR-BRAIN-W/O    (Results Pending)        Assessment/Plan  * Syncope- (present on admission)   Assessment & Plan    Likely due to orthostasis.    CT of the head unremarkable  Carotid Doppler did not reveal significant stenosis  Given vertigo-like symptoms today, poor coordination and reported blurred vision, will order MRI of the brain to rule out organic abnormality     Cellulitis of finger of right hand   Assessment & Plan    CT of the right hand did not show abscess.  Swelling, erythema, tenderness to palpation.  Patient started on clindamycin IV.  Continue with antibiotic, reassess day by day  Improving     Lumbar compression fracture (HCC)   Assessment & Plan    Pain management as needed.    Neurosurgery recommended TLSO brace.   PT OT recommended home health.  Referral will be placed     Orthostatic hypotension- (present on admission)   Assessment & Plan    Possibly secondary to dysautonomia secondary to diabetes.  Midodrine  PT/OT     Chronic diastolic heart failure (HCC)- (present on admission)   Assessment & Plan    Without current exacerbation.    Chronic lower extremity edema     Venous stasis dermatitis of both lower extremities- (present on admission)   Assessment & Plan    Monitor.      Bilateral leg edema- (present on admission)   Assessment & Plan     Appears to have chronic edematous changes.  Monitor.  Compression stockings should help with orthostatic dizziness as well     Essential hypertension- (present on admission)   Assessment & Plan    Controlled with current medication regimen.    He is on amiodarone.  Lisinopril and Lasix was not started on admission.  Blood pressure is fairly controlled.  We will consider restarting home blood pressure medication lisinopril, as well as Lasix if blood pressure goes up     Mixed hyperlipidemia- (present on admission)   Assessment & Plan    Continue current therapy           VTE prophylaxis: Heparin

## 2019-01-01 NOTE — THERAPY
"Physical Therapy Treatment completed.   Bed Mobility:  Supine to Sit:  (NT up in chair)  Transfers: Sit to Stand: Contact Guard Assist  Gait: Level Of Assist: Contact Guard Assist with Front-Wheel Walker       Plan of Care: Will benefit from Physical Therapy 3 times per week  Discharge Recommendations: Equipment: 4-Wheel Walker. Pt will benefit from 4WW so he has a place to sit when his BP drops.    See \"Rehab Therapy-Acute\" Patient Summary Report for complete documentation.     Pt presenting w/ the strength to perform mobility however is limited by his comorbidities. Pt was able to perform most mobility at a CGA level but requires close guarding w/ standing and gait d/t BP. Pt was able to walk 30 feet but then became less talkative. His BP was taken and was 98/69 which was a drastic drop from seated BP of 155-97. Pt requires a lot of prompting w/ verbalizing his symptoms as he kept stating, \"I'm ok.\" Currently pt presents as a high fall risk. Will continue to follow to address functional mobility and educate on sypmtom awareness.  "

## 2019-01-01 NOTE — THERAPY
"Occupational Therapy Evaluation completed.   Functional Status: Up in chair, mod A w/LSO management brace not fitting, CGA w/LB dressing, completed sit>stand walking in room w/fww and SBA, upon further ambulation pt was + for dizziness, pale skin and decreased conversation, assessed BP through out session started seated:155/97, standin/74, ambulating to 98/69, to unable to obtain then after ~3 min in bed supine: 154/98. RN aware.   Plan of Care: Will benefit from Occupational Therapy 3 times per week  Discharge Recommendations:  Equipment: Will Continue to Assess for Equipment Needs. Post-acute therapy TBD, BP was not stable this session high fall risk    See \"Rehab Therapy-Acute\" Patient Summary Report for complete documentation.      53 yr old male admitted for labile BP, pt has hx of CHF, Afib, DM, neuropathy, dyslipidemia, HTN, orthostatic hypotension, fall and pain. This pt is dx w/ L1 compression fracture being managed non-operatively w/LSO, as well as on going BP issues. During today's session pt demonstrated poor new learning w/brace management and poor attention as well as on going orthostatic issues. Pt was not verbalizing his symptoms until he was asked, and then reported +dizzines, blurred vision, fatigue and impaired balance. RN notified and aware of symptoms during session. Anticipate pt will continue to progress in this setting w/ADL participation greatly limitation is BP at this time making pt a high fall risk. Recommend 4ww as well as HH at d/c   "

## 2019-01-01 NOTE — PROGRESS NOTES
Received bedside report. Patient alert and oriented. No overnight events. No complaints of pain. Call light within reach.

## 2019-01-01 NOTE — PROGRESS NOTES
Monitor Summary:   0.28/0.10/0.44     Rhythm: first degree heart block SB-SR 56-68         Ectopy: R PVC

## 2019-01-01 NOTE — FACE TO FACE
Face to Face Note  -  Durable Medical Equipment    Colt Denton M.D. - NPI: [id number]  I certify that this patient is under my care and that they had a durable medical equipment(DME)face to face encounter by myself that meets the physician DME face-to-face encounter requirements with this patient on:    Date of encounter:   Patient:                    MRN:                       YOB: 2021  Sebastián Castro  [id number]  1965     The encounter with the patient was in whole, or in part, for the following medical condition, which is the primary reason for durable medical equipment:  Other - orthostatic hypotension    I certify that, based on my findings, the following durable medical equipment is medically necessary:  Walkers.    HOME O2 Saturation Measurements:(Values must be present for Home Oxygen orders)         ,     ,         My Clinical findings support the need for the above equipment due to:  Other - poor coordination    Supporting Symptoms: unsteady gait

## 2019-01-02 LAB
ANION GAP SERPL CALC-SCNC: 7 MMOL/L (ref 0–11.9)
BASOPHILS # BLD AUTO: 0.6 % (ref 0–1.8)
BASOPHILS # BLD: 0.04 K/UL (ref 0–0.12)
BUN SERPL-MCNC: 22 MG/DL (ref 8–22)
CALCIUM SERPL-MCNC: 9.2 MG/DL (ref 8.5–10.5)
CHLORIDE SERPL-SCNC: 100 MMOL/L (ref 96–112)
CO2 SERPL-SCNC: 28 MMOL/L (ref 20–33)
CREAT SERPL-MCNC: 0.93 MG/DL (ref 0.5–1.4)
EOSINOPHIL # BLD AUTO: 0.42 K/UL (ref 0–0.51)
EOSINOPHIL NFR BLD: 6.3 % (ref 0–6.9)
ERYTHROCYTE [DISTWIDTH] IN BLOOD BY AUTOMATED COUNT: 43.4 FL (ref 35.9–50)
GLUCOSE SERPL-MCNC: 100 MG/DL (ref 65–99)
HCT VFR BLD AUTO: 41.1 % (ref 42–52)
HGB BLD-MCNC: 13.3 G/DL (ref 14–18)
IMM GRANULOCYTES # BLD AUTO: 0.02 K/UL (ref 0–0.11)
IMM GRANULOCYTES NFR BLD AUTO: 0.3 % (ref 0–0.9)
LYMPHOCYTES # BLD AUTO: 2.68 K/UL (ref 1–4.8)
LYMPHOCYTES NFR BLD: 40.2 % (ref 22–41)
MCH RBC QN AUTO: 27.8 PG (ref 27–33)
MCHC RBC AUTO-ENTMCNC: 32.4 G/DL (ref 33.7–35.3)
MCV RBC AUTO: 85.8 FL (ref 81.4–97.8)
MONOCYTES # BLD AUTO: 0.63 K/UL (ref 0–0.85)
MONOCYTES NFR BLD AUTO: 9.5 % (ref 0–13.4)
NEUTROPHILS # BLD AUTO: 2.87 K/UL (ref 1.82–7.42)
NEUTROPHILS NFR BLD: 43.1 % (ref 44–72)
NRBC # BLD AUTO: 0 K/UL
NRBC BLD-RTO: 0 /100 WBC
PLATELET # BLD AUTO: 182 K/UL (ref 164–446)
PMV BLD AUTO: 10.4 FL (ref 9–12.9)
POTASSIUM SERPL-SCNC: 3.9 MMOL/L (ref 3.6–5.5)
RBC # BLD AUTO: 4.79 M/UL (ref 4.7–6.1)
SODIUM SERPL-SCNC: 135 MMOL/L (ref 135–145)
WBC # BLD AUTO: 6.7 K/UL (ref 4.8–10.8)

## 2019-01-02 PROCEDURE — 97535 SELF CARE MNGMENT TRAINING: CPT | Mod: XE

## 2019-01-02 PROCEDURE — 700102 HCHG RX REV CODE 250 W/ 637 OVERRIDE(OP): Performed by: HOSPITALIST

## 2019-01-02 PROCEDURE — G0378 HOSPITAL OBSERVATION PER HR: HCPCS

## 2019-01-02 PROCEDURE — 36415 COLL VENOUS BLD VENIPUNCTURE: CPT

## 2019-01-02 PROCEDURE — 85025 COMPLETE CBC W/AUTO DIFF WBC: CPT

## 2019-01-02 PROCEDURE — A9270 NON-COVERED ITEM OR SERVICE: HCPCS | Performed by: HOSPITALIST

## 2019-01-02 PROCEDURE — 700102 HCHG RX REV CODE 250 W/ 637 OVERRIDE(OP): Performed by: INTERNAL MEDICINE

## 2019-01-02 PROCEDURE — A9270 NON-COVERED ITEM OR SERVICE: HCPCS | Performed by: INTERNAL MEDICINE

## 2019-01-02 PROCEDURE — 99225 PR SUBSEQUENT OBSERVATION CARE,LEVEL II: CPT | Performed by: INTERNAL MEDICINE

## 2019-01-02 PROCEDURE — 80048 BASIC METABOLIC PNL TOTAL CA: CPT

## 2019-01-02 PROCEDURE — 97530 THERAPEUTIC ACTIVITIES: CPT

## 2019-01-02 RX ORDER — PYRIDOSTIGMINE BROMIDE 60 MG/1
120 TABLET ORAL 3 TIMES DAILY
Qty: 90 TAB | Refills: 3 | Status: SHIPPED | OUTPATIENT
Start: 2019-01-02 | End: 2019-02-27

## 2019-01-02 RX ORDER — MIDODRINE HYDROCHLORIDE 10 MG/1
10 TABLET ORAL 3 TIMES DAILY
Qty: 60 TAB | Refills: 0 | Status: SHIPPED | OUTPATIENT
Start: 2019-01-02 | End: 2019-02-27

## 2019-01-02 RX ORDER — METOCLOPRAMIDE 10 MG/1
10 TABLET ORAL
Status: DISCONTINUED | OUTPATIENT
Start: 2019-01-02 | End: 2019-01-05

## 2019-01-02 RX ORDER — M-VIT,TX,IRON,MINS/CALC/FOLIC 27MG-0.4MG
1 TABLET ORAL DAILY
Status: DISCONTINUED | OUTPATIENT
Start: 2019-01-03 | End: 2019-02-27 | Stop reason: HOSPADM

## 2019-01-02 RX ORDER — CLINDAMYCIN HYDROCHLORIDE 300 MG/1
300 CAPSULE ORAL 4 TIMES DAILY
Qty: 12 CAP | Refills: 0 | Status: SHIPPED | OUTPATIENT
Start: 2019-01-02 | End: 2019-02-27

## 2019-01-02 RX ADMIN — ACETAMINOPHEN 1000 MG: 500 TABLET ORAL at 05:18

## 2019-01-02 RX ADMIN — FLUOXETINE HYDROCHLORIDE 20 MG: 20 CAPSULE ORAL at 05:20

## 2019-01-02 RX ADMIN — Medication 400 MG: at 05:20

## 2019-01-02 RX ADMIN — PYRIDOSTIGMINE BROMIDE 120 MG: 60 TABLET ORAL at 11:20

## 2019-01-02 RX ADMIN — OXYCODONE HYDROCHLORIDE AND ACETAMINOPHEN 500 MG: 500 TABLET ORAL at 05:19

## 2019-01-02 RX ADMIN — PRAVASTATIN SODIUM 20 MG: 20 TABLET ORAL at 17:20

## 2019-01-02 RX ADMIN — RIVAROXABAN 20 MG: 20 TABLET, FILM COATED ORAL at 17:20

## 2019-01-02 RX ADMIN — CLINDAMYCIN HYDROCHLORIDE 300 MG: 150 CAPSULE ORAL at 17:20

## 2019-01-02 RX ADMIN — ACETAMINOPHEN 1000 MG: 500 TABLET ORAL at 23:11

## 2019-01-02 RX ADMIN — MIDODRINE HYDROCHLORIDE 10 MG: 5 TABLET ORAL at 05:20

## 2019-01-02 RX ADMIN — NYSTATIN: 100000 POWDER TOPICAL at 18:00

## 2019-01-02 RX ADMIN — FLUDROCORTISONE ACETATE 0.3 MG: 0.1 TABLET ORAL at 05:18

## 2019-01-02 RX ADMIN — MIDODRINE HYDROCHLORIDE 10 MG: 5 TABLET ORAL at 11:20

## 2019-01-02 RX ADMIN — METOCLOPRAMIDE HYDROCHLORIDE 10 MG: 10 TABLET ORAL at 17:20

## 2019-01-02 RX ADMIN — CLINDAMYCIN HYDROCHLORIDE 300 MG: 150 CAPSULE ORAL at 13:00

## 2019-01-02 RX ADMIN — AMIODARONE HYDROCHLORIDE 50 MG: 200 TABLET ORAL at 05:19

## 2019-01-02 RX ADMIN — PYRIDOSTIGMINE BROMIDE 120 MG: 60 TABLET ORAL at 17:20

## 2019-01-02 RX ADMIN — ACETAMINOPHEN 1000 MG: 500 TABLET ORAL at 17:20

## 2019-01-02 RX ADMIN — CLINDAMYCIN HYDROCHLORIDE 300 MG: 150 CAPSULE ORAL at 20:51

## 2019-01-02 RX ADMIN — NYSTATIN: 100000 POWDER TOPICAL at 05:23

## 2019-01-02 RX ADMIN — PYRIDOSTIGMINE BROMIDE 120 MG: 60 TABLET ORAL at 05:23

## 2019-01-02 RX ADMIN — CLINDAMYCIN HYDROCHLORIDE 300 MG: 150 CAPSULE ORAL at 08:18

## 2019-01-02 RX ADMIN — ACETAMINOPHEN 1000 MG: 500 TABLET ORAL at 11:20

## 2019-01-02 RX ADMIN — MIDODRINE HYDROCHLORIDE 10 MG: 5 TABLET ORAL at 17:19

## 2019-01-02 RX ADMIN — LISINOPRIL 5 MG: 5 TABLET ORAL at 05:18

## 2019-01-02 ASSESSMENT — ENCOUNTER SYMPTOMS
NECK PAIN: 0
DEPRESSION: 0
HEMOPTYSIS: 0
SPUTUM PRODUCTION: 0
VOMITING: 0
DIZZINESS: 1
MYALGIAS: 0
HEARTBURN: 0
DOUBLE VISION: 0
COUGH: 0
NAUSEA: 0
HEADACHES: 0
TINGLING: 0
PALPITATIONS: 0
BACK PAIN: 0
FEVER: 0
BLURRED VISION: 0
WEIGHT LOSS: 0
PHOTOPHOBIA: 0
CHILLS: 0
ORTHOPNEA: 0

## 2019-01-02 ASSESSMENT — PAIN SCALES - GENERAL
PAINLEVEL_OUTOF10: 2
PAINLEVEL_OUTOF10: 5
PAINLEVEL_OUTOF10: 0
PAINLEVEL_OUTOF10: 6
PAINLEVEL_OUTOF10: 3
PAINLEVEL_OUTOF10: 5

## 2019-01-02 ASSESSMENT — COGNITIVE AND FUNCTIONAL STATUS - GENERAL
DRESSING REGULAR LOWER BODY CLOTHING: A LITTLE
SUGGESTED CMS G CODE MODIFIER DAILY ACTIVITY: CJ
DAILY ACTIVITIY SCORE: 20
TOILETING: A LITTLE
HELP NEEDED FOR BATHING: A LITTLE
DRESSING REGULAR UPPER BODY CLOTHING: A LITTLE

## 2019-01-02 ASSESSMENT — LIFESTYLE VARIABLES: SUBSTANCE_ABUSE: 0

## 2019-01-02 NOTE — DISCHARGE SUMMARY
Discharge Summary    CHIEF COMPLAINT ON ADMISSION  Chief Complaint   Patient presents with   • ALOC       Reason for Admission  EMS     Admission Date  12/28/2018    CODE STATUS  Full Code    HPI & HOSPITAL COURSE  53-year-old male with history of CHF, paroxysmal A. fib on Xarelto, diabetic neuropathy, dyslipidemia, hypertension, noncompliance, persistent orthostatic dizziness/hypotension who presented with complaints of orthostatic syncope. It was associated with dizziness and back pain, on evaluation he was found to have a L1 compression fracture. Neurosurgery consulted and recommended TLSO brace.  No indication for surgery but did recommend follow-up in [facility] [place] [facility] with interval imaging and possible kyphoplasty if there is worsening.    Orthostatic dizziness persisted, but he did not have syncopal episodes in the hospital or episodes of psychogenic non-epileptiform seizures.  Etiology of his fall is likely secondary to his persistent orthostatic hypotension and related to autonomic dysfunction, which is difficult to control.  He was continued on Midodrine, Mestinon, Florinef (dose reduced to 0.2 mg daily), and Reglan was started.  He had good response and no further episodes of syncope, able to work with PT/OT and ambulate with a walker.  Given his recurrent falls and persistent orthostatic hypotension his home Xarelto was discontinued and he was started on a low-dose aspirin for his paroxysmal A. fib.  Risks outweighed the benefits at this juncture but can be reassessed in the future. Of note, he was also treated for a right hand cellulitis and yeast infection of the groin.  Both resolved by the time of discharge.    During his hospitalization he expressed suicidal ideation.  He was placed on a legal hold and psychiatry was consulted.  His medications were tapered and his mood improved throughout, thought process became more forward thinking.  His legal hold was lifted and he had no further thoughts of  self-harm.       Therefore, he is discharged in fair and stable condition to [facility] with close outpatient follow-up and home health services.      Discharge Date  2/27/2019    FOLLOW UP ITEMS POST DISCHARGE  Routine follow-up with PCP. Started to taper Florinef 0.2 mg daily and tolerating well.  Reglan was also started during this admission, with good response but is not a good long-term option.    [facility] ([phone] for an appointment) in about 4 weeks with another set of upright lumbar x-rays.    DISCHARGE DIAGNOSES  Principal Problem:    Syncope POA: Yes  Active Problems:    Orthostatic hypotension POA: Yes    Lumbar compression fracture (HCC) POA: Unknown    Cellulitis of finger of right hand POA: Unknown    Chronic diastolic heart failure (HCC) POA: Yes    Mixed hyperlipidemia POA: Yes    Essential hypertension POA: Yes    Bilateral leg edema POA: Yes    Venous stasis dermatitis of both lower extremities POA: Yes  Resolved Problems:    * No resolved hospital problems. *      FOLLOW UP  Future Appointments  Date Time Provider Department Center   1/23/2019 2:45 PM German Hinton M.D. [facility] None     [address] [address]  [phone]        German Hinton M.D.  [address]  [facility] [place] [address]  [facility] [address]  [phone]            MEDICATIONS ON DISCHARGE     Medication List      START taking these medications      Instructions   aspirin 81 MG Chew chewable tablet  Start taking on:  2/28/2019  Commonly known as:  ASA   Take 1 Tab by mouth every day.  Dose:  81 mg     cyanocobalamin 1000 MCG Tabs  Start taking on:  2/28/2019  Commonly known as:  VITAMIN B12   Take 1 Tab by mouth every day.  Dose:  1000 mcg     magnesium oxide 400 (241.3 Mg) MG Tabs tablet  Commonly known as:  MAG-OX   Take 1 Tab by mouth every day.  Dose:  400 mg     metoclopramide 10 MG Tabs  Commonly known as:  REGLAN   Take 1 Tab by mouth 3 times a day before meals for 14 days.  Dose:   10 mg     risperiDONE 3 MG Tabs  Commonly known as:  RISPERDAL   Take 1 Tab by mouth every evening.  Dose:  3 mg        CHANGE how you take these medications      Instructions   fludrocortisone 0.1 MG Tabs  Start taking on:  2/28/2019  What changed:  how much to take  Commonly known as:  FLORINEF   Take 2 Tabs by mouth every morning.  Dose:  0.2 mg     FLUoxetine 20 MG Caps  Start taking on:  2/28/2019  What changed:  how much to take  Commonly known as:  PROZAC   Take 3 Caps by mouth every day.  Dose:  60 mg     furosemide 20 MG Tabs  What changed:  · how much to take  · when to take this  Commonly known as:  LASIX   Take 0.5 Tabs by mouth 1 time daily as needed (Edema, Shortness of breath or weight gain.).  Dose:  10 mg     pyridostigmine 60 MG Tabs  What changed:  how much to take  Commonly known as:  MESTINON   Take 2 Tabs by mouth 3 times a day.  Dose:  120 mg        CONTINUE taking these medications      Instructions   amiodarone 100 MG tablet  Commonly known as:  PACERONE   Take 0.5 Tabs by mouth every day.  Dose:  50 mg     midodrine 10 MG tablet  Commonly known as:  PROAMATINE   Doctor's comments:  Hold if SBP > 150  Take 1 Tab by mouth 3 times a day.  Dose:  10 mg     pravastatin 20 MG Tabs  Commonly known as:  PRAVACHOL   Take 1 Tab by mouth every day.  Dose:  20 mg     sodium chloride 1 GM Tabs  Commonly known as:  SALT   Take 1 Tab by mouth 3 times a day, with meals.  Dose:  1 g        STOP taking these medications    lisinopril 5 MG Tabs  Commonly known as:  PRINIVIL     rivaroxaban 20 MG Tabs tablet  Commonly known as:  XARELTO            Allergies  Allergies   Allergen Reactions   • Daptomycin Rash     Body rash  RXN=1/2018     • Pcn [Penicillins] Hives and Rash      Rash & hives  RXN=since a kid   • Unasyn [Ampicillin-Sulbactam Sodium] Hives, Itching and Swelling   • Vancomycin Rash     Red man syndrome       DIET  Orders Placed This Encounter   Procedures   • Diet Order Regular     Standing Status:    Standing     Number of Occurrences:   1     Order Specific Question:   Diet:     Answer:   Regular [1]       ACTIVITY  As tolerated.  Weight bearing as tolerated    CONSULTATIONS  Neurosurgery  Psychiatry     PROCEDURES  DX-HIP-UNILATERAL-WITH PELVIS-1 VIEW RIGHT   Final Result      No radiographic evidence of acute traumatic injury right hip.      DX-CHEST-PORTABLE (1 VIEW)   Final Result      Hypoinflation without other evidence for acute cardiopulmonary disease.      MR-BRAIN-W/O   Final Result      1.  MRI of the brain without contrast within normal limits for age with mild white matter changes.   2.  Chronic appearing sinus disease   3.  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 This does not have an aggressive appearance.      US-EXTREMITY VENOUS LOWER BILAT   Final Result      CT-HAND WITH RIGHT   Final Result      1.  Edema in the dorsum of the forearm and hand without a discrete fluid collection to suggest abscess is identified.      US-CAROTID DOPPLER BILAT   Final Result      DX-LUMBAR SPINE-2 OR 3 VIEWS   Final Result         Stable compression deformity in the L1 vertebral body.      CT-CTA CHEST PULMONARY ARTERY W/ RECONS   Final Result         1. No CT evidence of pulmonary embolism.      2. Old right rib fractures.      3. Cholelithiasis. Enlarged spleen with granulomas.      4. Mild superior endplate compression deformity of L1 could be acute. Correlate with point tenderness. No retropulsion. No malalignment.      CT-CSPINE WITHOUT PLUS RECONS   Final Result         1. No acute fracture from C1 through T3 is visualized.         CT-HEAD W/O   Final Result         1. No acute intracranial abnormality. No evidence of acute intracranial hemorrhage or mass lesion.               DX-THORACIC SPINE-WITH SWIMMERS VIEW   Final Result      No acute thoracic spine fracture or subluxation identified.      DX-CHEST-PORTABLE (1 VIEW)   Final Result      No acute  cardiac or pulmonary abnormality is noted.            LABORATORY  Lab Results   Component Value Date    SODIUM 135 01/02/2019    POTASSIUM 3.9 01/02/2019    CHLORIDE 100 01/02/2019    CO2 28 01/02/2019    GLUCOSE 100 (H) 01/02/2019    BUN 22 01/02/2019    CREATININE 0.93 01/02/2019        Lab Results   Component Value Date    WBC 6.7 01/02/2019    HEMOGLOBIN 13.3 (L) 01/02/2019    HEMATOCRIT 41.1 (L) 01/02/2019    PLATELETCT 182 01/02/2019        Total time of the discharge process exceeds 45 minutes.

## 2019-01-02 NOTE — PROGRESS NOTES
Hospital Medicine Daily Progress Note    Date of Service  1/1/2019    Chief Complaint  53-year-old male with history of CHF, paroxysmal A. fib, diabetes neuropathy, dyslipidemia, hypertension, noncompliance, persistent orthostatic dizziness/hypotension, presented with complaints of orthostatic syncopal and dizziness, back pain.  Found to have a L1 fracture from ground-level fall.  Neurosurgery consulted  Patient started on antibiotic for right hand cellulitis      Interval Problem Update    Patient has blood pressure which is trending up, and still he has orthostatic hypotension.  PT/OT worked with the patient for about 2-3 minutes, when he started feeling lightheaded and blood pressure going down from 150 to undetectable.  Unsafe for discharge.  We will increase dose of Mestinon today.  Continue midodrine.  We will increase dose up to 10 mg 3 times a day  Right hand cellulitis: Significantly improved    PT recommended home health.  Referral placed.    Consultants/Specialty  None    Code Status  Full code    Disposition  To be discussed    Review of Systems  Review of Systems   Constitutional: Negative for chills, fever and weight loss.   HENT: Negative for ear pain, hearing loss and tinnitus.    Eyes: Negative for blurred vision, double vision and photophobia.   Respiratory: Negative for cough, hemoptysis and sputum production.    Cardiovascular: Negative for chest pain, palpitations and orthopnea.   Gastrointestinal: Negative for heartburn, nausea and vomiting.   Genitourinary: Negative for dysuria, frequency and urgency.   Musculoskeletal: Negative for back pain, myalgias and neck pain.   Skin: Negative for itching and rash.   Neurological: Positive for dizziness. Negative for tingling and headaches.   Psychiatric/Behavioral: Negative for depression, substance abuse and suicidal ideas.        Physical Exam  Temp:  [36.2 °C (97.2 °F)-36.9 °C (98.5 °F)] 36.8 °C (98.3 °F)  Pulse:  [54-62] 62  Resp:  [13-16] 16  BP:  (114-155)/(70-97) 140/70    Physical Exam   Constitutional: He is oriented to person, place, and time. He appears well-developed.   HENT:   Head: Normocephalic and atraumatic.   Eyes: Pupils are equal, round, and reactive to light. EOM are normal.   Cardiovascular: Normal rate and regular rhythm.    Pulmonary/Chest: Effort normal and breath sounds normal. No respiratory distress.   Abdominal: He exhibits no distension.   Musculoskeletal: Normal range of motion. He exhibits edema.   Right hand swelling, erythema, tenderness significantly improved   Neurological: He is alert and oriented to person, place, and time. He displays tremor. Coordination abnormal.   Positional tremor   Skin: Skin is warm and dry.   Nursing note and vitals reviewed.      Fluids    Intake/Output Summary (Last 24 hours) at 01/01/19 1730  Last data filed at 01/01/19 0800   Gross per 24 hour   Intake              360 ml   Output              650 ml   Net             -290 ml       Laboratory  Recent Labs      12/30/18   0445  12/31/18   0101   WBC  6.0  6.3   RBC  4.48*  4.44*   HEMOGLOBIN  12.7*  12.5*   HEMATOCRIT  38.3*  38.0*   MCV  85.5  85.6   MCH  28.3  28.2   MCHC  33.2*  32.9*   RDW  44.2  44.1   PLATELETCT  157*  158*   MPV  10.8  10.4     Recent Labs      12/30/18   0445  12/31/18   0101   SODIUM  138  134*   POTASSIUM  3.5*  3.9   CHLORIDE  104  103   CO2  27  28   GLUCOSE  92  112*   BUN  20  23*   CREATININE  0.71  0.75   CALCIUM  8.5  8.9                   Imaging  MR-BRAIN-W/O   Final Result      1.  MRI of the brain without contrast within normal limits for age with mild white matter changes.   2.  Chronic appearing sinus disease   3.  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 This does not have an aggressive appearance.      US-EXTREMITY VENOUS LOWER BILAT   Final Result      CT-HAND WITH RIGHT   Final Result      1.  Edema in the dorsum of the forearm and hand without a discrete  fluid collection to suggest abscess is identified.      US-CAROTID DOPPLER BILAT   Final Result      DX-LUMBAR SPINE-2 OR 3 VIEWS   Final Result         Stable compression deformity in the L1 vertebral body.      CT-CTA CHEST PULMONARY ARTERY W/ RECONS   Final Result         1. No CT evidence of pulmonary embolism.      2. Old right rib fractures.      3. Cholelithiasis. Enlarged spleen with granulomas.      4. Mild superior endplate compression deformity of L1 could be acute. Correlate with point tenderness. No retropulsion. No malalignment.      CT-CSPINE WITHOUT PLUS RECONS   Final Result         1. No acute fracture from C1 through T3 is visualized.         CT-HEAD W/O   Final Result         1. No acute intracranial abnormality. No evidence of acute intracranial hemorrhage or mass lesion.               DX-THORACIC SPINE-WITH SWIMMERS VIEW   Final Result      No acute thoracic spine fracture or subluxation identified.      DX-CHEST-PORTABLE (1 VIEW)   Final Result      No acute cardiac or pulmonary abnormality is noted.           Assessment/Plan  * Syncope- (present on admission)   Assessment & Plan    Likely due to orthostasis.    CT of the head unremarkable  Carotid Doppler did not reveal significant stenosis  Given vertigo-like symptoms today, poor coordination and reported blurred vision, will order MRI of the brain to rule out organic abnormality     Cellulitis of finger of right hand   Assessment & Plan    CT of the right hand did not show abscess.  Swelling, erythema, tenderness to palpation. - -We will was treated  Clindamycin       Lumbar compression fracture (HCC)   Assessment & Plan    Pain management as needed.    Neurosurgery recommended TLSO brace.   PT OT recommended home health.  Referral will be placed     Orthostatic hypotension- (present on admission)   Assessment & Plan    Possibly secondary to dysautonomia secondary to diabetes.  Midodrine  Mestinon, dose increased  PT/OT  Ambulate briefly  frequently   Safety counseling  4 wheel walker ordered per PT OT recommendation     Chronic diastolic heart failure (HCC)- (present on admission)   Assessment & Plan    Without current exacerbation.    Chronic lower extremity edema     Venous stasis dermatitis of both lower extremities- (present on admission)   Assessment & Plan    Monitor.      Bilateral leg edema- (present on admission)   Assessment & Plan    Appears to have chronic edematous changes.  Monitor.  Compression stockings should help with orthostatic dizziness as well     Essential hypertension- (present on admission)   Assessment & Plan    Controlled with current medication regimen.    He is on amiodarone.  Lisinopril and Lasix was not started on admission.  Blood pressure is fairly controlled.  We will consider restarting home blood pressure medication lisinopril, as well as Lasix if blood pressure goes up     Mixed hyperlipidemia- (present on admission)   Assessment & Plan    Continue current therapy           VTE prophylaxis: Heparin

## 2019-01-02 NOTE — CARE PLAN
Problem: Communication  Goal: The ability to communicate needs accurately and effectively will improve  Outcome: PROGRESSING AS EXPECTED  Pt. Is oriented to call light system and calls when assistance is needed. Pt. Updated on plan of care. Pt. Encouraged to communicate any needs or concerns. Questions have been addressed.     Problem: Knowledge Deficit  Goal: Knowledge of disease process/condition, treatment plan, diagnostic tests, and medications will improve  Outcome: PROGRESSING AS EXPECTED  Educated pt on plan of care, treatment, medications and diagnosis. Pt verbalizes understanding and is compliant.

## 2019-01-02 NOTE — THERAPY
"Occupational Therapy Treatment completed with focus on ADLs, ADL transfers, patient education and caregiver training.  Functional Status:  Pt was seen for Occupational Therapy treatment today, see Therapy Kardex for details. Treatment included education in breath control with activity and at rest, self pacing techs and energy conservation for pain management. Educated pt in safety awareness techs as well. Psychosocial intervention addressed. Pt present with orthostatic hypotension. BP taken throughout OT session. Sitting prior to activity 161/86 HR 66.   Pt's SO present during OT session for CG training.    Pt demonstrated Min A for UB dressing, Min/Mod A for LSO donning correctly. Pt needed reminder cues for maintaining NSP . Pt has a tendency to twist when looking/reaching for straps to tighten LSO.  Min A for LB dressing seated base for sock and pnat donning using AE seated base. Pt required extended to complete. Pt did need Min A for problem solving. BP sitting post activity 150/84 HR 74; Pt c/o \"dizziness and lightheadedness and spots before my eyes\" while standing without activty. BP taken in standing 103/55 HR 93. Significant change in BP.  OT session ended while pt was assisted to sit back in chair.  RN informed.  Pt unable to attempt walking to BR this OT session due to BP issues. RN agreed. Reviewed breath control techs with activity and standing. Pt is impulsive and needed cues for self pacing with all activity.    Pt was left up in chair , call light in reach, RN present and SO present. Nursing is aware of OT session findings and recommendations.  Continue Occupational Therapy services as per plan.    Plan of Care: Will benefit from Occupational Therapy 3 times per week  Discharge Recommendations:  Equipment Will Continue to Assess for Equipment Needs. Post-acute therapy Discharge to home with outpatient or home health for additional skilled therapy services.      See \"Rehab Therapy-Acute\" Patient " Summary Report for complete documentation.

## 2019-01-02 NOTE — PROGRESS NOTES
Bedside report received from day shift RN. Pt is A&O x 4. Pt. Is in bed. Pt. Updated on plan of care. All questions answered. Pt.s bed is locked, in lowest position, with bed alarms on. All fall precautions in place. Pt. Has no other needs at this time.

## 2019-01-02 NOTE — FACE TO FACE
Face to Face Note  -  Durable Medical Equipment    Colt Denton M.D. - NPI: [id number]  I certify that this patient is under my care and that they have had a durable medical equipment(DME)face to face encounter by myself that meets the physician DME face-to-face encounter requirements with this patient on:    Date of encounter:   Patient:                    MRN:                       YOB: 2021  Sebastián Castro  [id number]  1965     The encounter with the patient was in whole, or in part, for the following medical condition, which is the primary reason for durable medical equipment:  Other - fall    I certify that, based on my findings, the following durable medical equipment is medically necessary:  Walkers.    HOME O2 Saturation Measurements:(Values must be present for Home Oxygen orders)         ,     ,         My Clinical findings support the need for the above equipment due to:  Other - Poor coordination    Supporting Symptoms: History of fall    ------------------------------------------------------------------------------------------------------------------    Face to Face Supporting Documentation - Home Health    The encounter with this patient was in whole or in part the primary reason for home health admission.    Date of encounter:   Patient:                    MRN:                       YOB: 2021  Sebastián Castro  [id number]  1965     Home health to see patient for:  Skilled Nursing care for assessment, interventions & education    Skilled need for:  Recent Deterioration of Health Status Fall    Skilled nursing interventions to include:  Comment: PT OT, assessment and education    Homebound evidenced status by:  Needs the assistance of another person in order to leave the home. Leaving home must require a considerable and taxing effort. There must exist a normal inability to leave the home.    Community Physician to provide follow up care: German Hinton,  M.D.     Optional Interventions    Wound information & treatment:    Home Infusion Therapy orders:    Line/Drain/Airway:    I certify the face to face encounter for this home care referral meets the CMS requirements and the encounter/clinical assessment with the patient was, in whole, or in part, for the medical condition(s) listed above, which is the primary reason for home health care. Based on my clinical findings: the service(s) are medically necessary, support the need for home health care, and the homebound criteria are met.  I certify that this patient has had a face to face encounter by myself.  Colt Denton M.D. - NPI: [id number]    *Debility, frailty and advanced age in the absence of an acute deterioration or exacerbation of a condition do not qualify a patient for home health.

## 2019-01-03 LAB
ANION GAP SERPL CALC-SCNC: 6 MMOL/L (ref 0–11.9)
BASOPHILS # BLD AUTO: 0.5 % (ref 0–1.8)
BASOPHILS # BLD: 0.04 K/UL (ref 0–0.12)
BUN SERPL-MCNC: 31 MG/DL (ref 8–22)
CALCIUM SERPL-MCNC: 9.7 MG/DL (ref 8.5–10.5)
CHLORIDE SERPL-SCNC: 102 MMOL/L (ref 96–112)
CO2 SERPL-SCNC: 27 MMOL/L (ref 20–33)
CREAT SERPL-MCNC: 0.78 MG/DL (ref 0.5–1.4)
EOSINOPHIL # BLD AUTO: 0.43 K/UL (ref 0–0.51)
EOSINOPHIL NFR BLD: 5.7 % (ref 0–6.9)
ERYTHROCYTE [DISTWIDTH] IN BLOOD BY AUTOMATED COUNT: 43.8 FL (ref 35.9–50)
GLUCOSE SERPL-MCNC: 97 MG/DL (ref 65–99)
HCT VFR BLD AUTO: 41.2 % (ref 42–52)
HGB BLD-MCNC: 13.4 G/DL (ref 14–18)
IMM GRANULOCYTES # BLD AUTO: 0.02 K/UL (ref 0–0.11)
IMM GRANULOCYTES NFR BLD AUTO: 0.3 % (ref 0–0.9)
LYMPHOCYTES # BLD AUTO: 2.94 K/UL (ref 1–4.8)
LYMPHOCYTES NFR BLD: 39 % (ref 22–41)
MCH RBC QN AUTO: 27.8 PG (ref 27–33)
MCHC RBC AUTO-ENTMCNC: 32.5 G/DL (ref 33.7–35.3)
MCV RBC AUTO: 85.5 FL (ref 81.4–97.8)
MONOCYTES # BLD AUTO: 0.65 K/UL (ref 0–0.85)
MONOCYTES NFR BLD AUTO: 8.6 % (ref 0–13.4)
NEUTROPHILS # BLD AUTO: 3.46 K/UL (ref 1.82–7.42)
NEUTROPHILS NFR BLD: 45.9 % (ref 44–72)
NRBC # BLD AUTO: 0 K/UL
NRBC BLD-RTO: 0 /100 WBC
PHOSPHATE SERPL-MCNC: 3.8 MG/DL (ref 2.5–4.5)
PLATELET # BLD AUTO: 197 K/UL (ref 164–446)
PMV BLD AUTO: 10.3 FL (ref 9–12.9)
POTASSIUM SERPL-SCNC: 4.2 MMOL/L (ref 3.6–5.5)
RBC # BLD AUTO: 4.82 M/UL (ref 4.7–6.1)
SODIUM SERPL-SCNC: 135 MMOL/L (ref 135–145)
VIT B12 SERPL-MCNC: 183 PG/ML (ref 211–911)
WBC # BLD AUTO: 7.5 K/UL (ref 4.8–10.8)

## 2019-01-03 PROCEDURE — 85025 COMPLETE CBC W/AUTO DIFF WBC: CPT

## 2019-01-03 PROCEDURE — A9270 NON-COVERED ITEM OR SERVICE: HCPCS | Performed by: INTERNAL MEDICINE

## 2019-01-03 PROCEDURE — A9270 NON-COVERED ITEM OR SERVICE: HCPCS | Performed by: HOSPITALIST

## 2019-01-03 PROCEDURE — 700102 HCHG RX REV CODE 250 W/ 637 OVERRIDE(OP): Performed by: HOSPITALIST

## 2019-01-03 PROCEDURE — 84100 ASSAY OF PHOSPHORUS: CPT

## 2019-01-03 PROCEDURE — G0378 HOSPITAL OBSERVATION PER HR: HCPCS

## 2019-01-03 PROCEDURE — 700102 HCHG RX REV CODE 250 W/ 637 OVERRIDE(OP): Performed by: INTERNAL MEDICINE

## 2019-01-03 PROCEDURE — 700111 HCHG RX REV CODE 636 W/ 250 OVERRIDE (IP): Performed by: INTERNAL MEDICINE

## 2019-01-03 PROCEDURE — 82607 VITAMIN B-12: CPT

## 2019-01-03 PROCEDURE — 99225 PR SUBSEQUENT OBSERVATION CARE,LEVEL II: CPT | Performed by: INTERNAL MEDICINE

## 2019-01-03 PROCEDURE — 80048 BASIC METABOLIC PNL TOTAL CA: CPT

## 2019-01-03 PROCEDURE — 36415 COLL VENOUS BLD VENIPUNCTURE: CPT

## 2019-01-03 RX ORDER — CYANOCOBALAMIN 1000 UG/ML
1000 INJECTION, SOLUTION INTRAMUSCULAR; SUBCUTANEOUS
Status: COMPLETED | OUTPATIENT
Start: 2019-01-03 | End: 2019-01-09

## 2019-01-03 RX ORDER — METHYLPREDNISOLONE SODIUM SUCCINATE 125 MG/2ML
125 INJECTION, POWDER, LYOPHILIZED, FOR SOLUTION INTRAMUSCULAR; INTRAVENOUS ONCE
Status: COMPLETED | OUTPATIENT
Start: 2019-01-03 | End: 2019-01-03

## 2019-01-03 RX ADMIN — MIDODRINE HYDROCHLORIDE 10 MG: 5 TABLET ORAL at 12:19

## 2019-01-03 RX ADMIN — PYRIDOSTIGMINE BROMIDE 120 MG: 60 TABLET ORAL at 05:18

## 2019-01-03 RX ADMIN — ACETAMINOPHEN 1000 MG: 500 TABLET ORAL at 05:19

## 2019-01-03 RX ADMIN — NYSTATIN: 100000 POWDER TOPICAL at 17:30

## 2019-01-03 RX ADMIN — METOCLOPRAMIDE HYDROCHLORIDE 10 MG: 10 TABLET ORAL at 17:30

## 2019-01-03 RX ADMIN — CLINDAMYCIN HYDROCHLORIDE 300 MG: 150 CAPSULE ORAL at 12:18

## 2019-01-03 RX ADMIN — RIVAROXABAN 20 MG: 20 TABLET, FILM COATED ORAL at 17:30

## 2019-01-03 RX ADMIN — METOCLOPRAMIDE HYDROCHLORIDE 10 MG: 10 TABLET ORAL at 12:19

## 2019-01-03 RX ADMIN — CYANOCOBALAMIN 1000 MCG: 1000 INJECTION INTRAMUSCULAR; SUBCUTANEOUS at 17:29

## 2019-01-03 RX ADMIN — MIDODRINE HYDROCHLORIDE 10 MG: 5 TABLET ORAL at 05:19

## 2019-01-03 RX ADMIN — CLINDAMYCIN HYDROCHLORIDE 300 MG: 150 CAPSULE ORAL at 17:30

## 2019-01-03 RX ADMIN — MIDODRINE HYDROCHLORIDE 10 MG: 5 TABLET ORAL at 17:30

## 2019-01-03 RX ADMIN — PRAVASTATIN SODIUM 20 MG: 20 TABLET ORAL at 17:30

## 2019-01-03 RX ADMIN — METOCLOPRAMIDE HYDROCHLORIDE 10 MG: 10 TABLET ORAL at 05:17

## 2019-01-03 RX ADMIN — OXYCODONE HYDROCHLORIDE AND ACETAMINOPHEN 500 MG: 500 TABLET ORAL at 05:19

## 2019-01-03 RX ADMIN — FLUDROCORTISONE ACETATE 0.3 MG: 0.1 TABLET ORAL at 05:19

## 2019-01-03 RX ADMIN — Medication 400 MG: at 05:19

## 2019-01-03 RX ADMIN — PYRIDOSTIGMINE BROMIDE 120 MG: 60 TABLET ORAL at 12:19

## 2019-01-03 RX ADMIN — NYSTATIN: 100000 POWDER TOPICAL at 05:17

## 2019-01-03 RX ADMIN — FLUOXETINE HYDROCHLORIDE 20 MG: 20 CAPSULE ORAL at 05:19

## 2019-01-03 RX ADMIN — PYRIDOSTIGMINE BROMIDE 120 MG: 60 TABLET ORAL at 17:30

## 2019-01-03 RX ADMIN — CLINDAMYCIN HYDROCHLORIDE 300 MG: 150 CAPSULE ORAL at 08:07

## 2019-01-03 RX ADMIN — AMIODARONE HYDROCHLORIDE 50 MG: 200 TABLET ORAL at 05:17

## 2019-01-03 RX ADMIN — METHYLPREDNISOLONE SODIUM SUCCINATE 125 MG: 125 INJECTION, POWDER, FOR SOLUTION INTRAMUSCULAR; INTRAVENOUS at 08:07

## 2019-01-03 RX ADMIN — LISINOPRIL 5 MG: 5 TABLET ORAL at 05:19

## 2019-01-03 RX ADMIN — MULTIPLE VITAMINS W/ MINERALS TAB 1 TABLET: TAB at 05:19

## 2019-01-03 ASSESSMENT — ENCOUNTER SYMPTOMS
WEIGHT LOSS: 0
HEMOPTYSIS: 0
BLURRED VISION: 0
BACK PAIN: 0
TINGLING: 0
VOMITING: 0
COUGH: 0
HEADACHES: 0
MYALGIAS: 0
DEPRESSION: 0
FEVER: 0
PALPITATIONS: 0
HEARTBURN: 0
SPUTUM PRODUCTION: 0
DIZZINESS: 1
NECK PAIN: 0
PHOTOPHOBIA: 0
CHILLS: 0
NAUSEA: 0
DOUBLE VISION: 0
ORTHOPNEA: 0

## 2019-01-03 ASSESSMENT — PAIN SCALES - GENERAL
PAINLEVEL_OUTOF10: 0

## 2019-01-03 ASSESSMENT — LIFESTYLE VARIABLES: SUBSTANCE_ABUSE: 0

## 2019-01-03 NOTE — PROGRESS NOTES
Hospital Medicine Daily Progress Note    Date of Service  1/2/2019    Chief Complaint  53-year-old male with history of CHF, paroxysmal A. fib, diabetes neuropathy, dyslipidemia, hypertension, noncompliance, persistent orthostatic dizziness/hypotension, presented with complaints of orthostatic syncopal and dizziness, back pain.  Found to have a L1 fracture from ground-level fall.  Neurosurgery consulted  Patient started on antibiotic for right hand cellulitis      Interval Problem Update    Upon attempt to ambulate him with occupational therapy, patient again became dizzy.  Remains orthostatically positive.  [place] to be unsafe discharge.  OT recommended home health  Started on metoclopramide, additionally to all his medications for orthostatic hypotension he is already on: Midodrine, Mestinon, fludrocortisone    Consultants/Specialty  None    Code Status  Full code    Disposition  Difficult discharge    Review of Systems  Review of Systems   Constitutional: Negative for chills, fever and weight loss.   HENT: Negative for ear pain, hearing loss and tinnitus.    Eyes: Negative for blurred vision, double vision and photophobia.   Respiratory: Negative for cough, hemoptysis and sputum production.    Cardiovascular: Negative for chest pain, palpitations and orthopnea.   Gastrointestinal: Negative for heartburn, nausea and vomiting.   Genitourinary: Negative for dysuria, frequency and urgency.   Musculoskeletal: Negative for back pain, myalgias and neck pain.   Skin: Negative for itching and rash.   Neurological: Positive for dizziness. Negative for tingling and headaches.   Psychiatric/Behavioral: Negative for depression, substance abuse and suicidal ideas.        Physical Exam  Temp:  [36.1 °C (97 °F)-36.9 °C (98.4 °F)] 36.1 °C (97 °F)  Pulse:  [57-83] 67  Resp:  [16-18] 17  BP: (102-179)/(55-86) 151/73    Physical Exam   Constitutional: He is oriented to person, place, and time. He appears well-developed.   HENT:    Head: Normocephalic and atraumatic.   Eyes: Pupils are equal, round, and reactive to light. EOM are normal.   Cardiovascular: Normal rate and regular rhythm.    Pulmonary/Chest: Effort normal and breath sounds normal. No respiratory distress.   Abdominal: He exhibits no distension.   Musculoskeletal: Normal range of motion. He exhibits edema.   Right hand swelling, erythema, tenderness significantly improved   Neurological: He is alert and oriented to person, place, and time. He displays tremor. Coordination abnormal.   Positional tremor   Skin: Skin is warm and dry.   Nursing note and vitals reviewed.      Fluids    Intake/Output Summary (Last 24 hours) at 01/02/19 1812  Last data filed at 01/02/19 1700   Gross per 24 hour   Intake              350 ml   Output             1750 ml   Net            -1400 ml       Laboratory  Recent Labs      12/31/18   0101  01/02/19   0040   WBC  6.3  6.7   RBC  4.44*  4.79   HEMOGLOBIN  12.5*  13.3*   HEMATOCRIT  38.0*  41.1*   MCV  85.6  85.8   MCH  28.2  27.8   MCHC  32.9*  32.4*   RDW  44.1  43.4   PLATELETCT  158*  182   MPV  10.4  10.4     Recent Labs      12/31/18   0101  01/02/19   0040   SODIUM  134*  135   POTASSIUM  3.9  3.9   CHLORIDE  103  100   CO2  28  28   GLUCOSE  112*  100*   BUN  23*  22   CREATININE  0.75  0.93   CALCIUM  8.9  9.2                   Imaging  MR-BRAIN-W/O   Final Result      1.  MRI of the brain without contrast within normal limits for age with mild white matter changes.   2.  Chronic appearing sinus disease   3.  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 This does not have an aggressive appearance.      US-EXTREMITY VENOUS LOWER BILAT   Final Result      CT-HAND WITH RIGHT   Final Result      1.  Edema in the dorsum of the forearm and hand without a discrete fluid collection to suggest abscess is identified.      US-CAROTID DOPPLER BILAT   Final Result      DX-LUMBAR SPINE-2 OR 3 VIEWS   Final  Result         Stable compression deformity in the L1 vertebral body.      CT-CTA CHEST PULMONARY ARTERY W/ RECONS   Final Result         1. No CT evidence of pulmonary embolism.      2. Old right rib fractures.      3. Cholelithiasis. Enlarged spleen with granulomas.      4. Mild superior endplate compression deformity of L1 could be acute. Correlate with point tenderness. No retropulsion. No malalignment.      CT-CSPINE WITHOUT PLUS RECONS   Final Result         1. No acute fracture from C1 through T3 is visualized.         CT-HEAD W/O   Final Result         1. No acute intracranial abnormality. No evidence of acute intracranial hemorrhage or mass lesion.               DX-THORACIC SPINE-WITH SWIMMERS VIEW   Final Result      No acute thoracic spine fracture or subluxation identified.      DX-CHEST-PORTABLE (1 VIEW)   Final Result      No acute cardiac or pulmonary abnormality is noted.           Assessment/Plan  * Syncope- (present on admission)   Assessment & Plan    Likely due to orthostasis.    CT of the head unremarkable  Carotid Doppler did not reveal significant stenosis  Given vertigo-like symptoms today, poor coordination and reported blurred vision, will order MRI of the brain to rule out organic abnormality     Cellulitis of finger of right hand   Assessment & Plan    CT of the right hand did not show abscess.  Swelling, erythema, tenderness to palpation. - -We will was treated  Clindamycin       Lumbar compression fracture (HCC)   Assessment & Plan    Pain management as needed.    Neurosurgery recommended TLSO brace.   PT OT recommended home health.  Referral will be placed     Orthostatic hypotension- (present on admission)   Assessment & Plan    Possibly secondary to dysautonomia secondary to diabetes.  Midodrine  Mestinon, dose increased  Fludrocortisone, maximal dose  PT/OT  Ambulate briefly frequently   Safety counseling  4 wheel walker ordered per PT OT recommendation  Added  metoclopramide  Continue PT OT, daily orthostatics vitals, reevaluate  Monitor blood pressure     Chronic diastolic heart failure (HCC)- (present on admission)   Assessment & Plan    Without current exacerbation.    Chronic lower extremity edema     Venous stasis dermatitis of both lower extremities- (present on admission)   Assessment & Plan    Monitor.      Bilateral leg edema- (present on admission)   Assessment & Plan    Appears to have chronic edematous changes.  Monitor.  Compression stockings should help with orthostatic dizziness as well     Essential hypertension- (present on admission)   Assessment & Plan    Controlled with current medication regimen.    He is on amiodarone.  Lisinopril and Lasix was not started on admission.  Blood pressure is fairly controlled.  Lisinopril restarted  Blood pressure fluctuates.  Remains orthostatic     Mixed hyperlipidemia- (present on admission)   Assessment & Plan    Continue current therapy           VTE prophylaxis: Heparin

## 2019-01-03 NOTE — CARE PLAN
Problem: Communication  Goal: The ability to communicate needs accurately and effectively will improve  Outcome: PROGRESSING AS EXPECTED  Pt. Is oriented to call light system and calls when assistance is needed. Pt. Updated on plan of care. Pt. Encouraged to communicate any needs or concerns. Questions have been addressed.     Problem: Discharge Barriers/Planning  Goal: Patient's continuum of care needs will be met  Outcome: PROGRESSING AS EXPECTED  Discussed discharge barriers with patient

## 2019-01-03 NOTE — CARE PLAN
Problem: Safety  Goal: Will remain free from falls  Safety precautions reviewed with pt, pt verbalized understanding and denies questions.  Pt demonstrated ability to use call light for assistance. Bed alarm in use due to positive orthostatics      Problem: Infection  Goal: Will remain free from infection  Pt has no active s/s of infection at this time.

## 2019-01-03 NOTE — PROGRESS NOTES
Hospital Medicine Daily Progress Note    Date of Service  1/3/2019    Chief Complaint  53-year-old male with history of CHF, paroxysmal A. fib, diabetes neuropathy, dyslipidemia, hypertension, noncompliance, persistent orthostatic dizziness/hypotension, presented with complaints of orthostatic syncopal and dizziness, back pain.  Found to have a L1 fracture from ground-level fall.  Neurosurgery consulted  Patient started on antibiotic for right hand cellulitis      Interval Problem Update  Low vitamin B12: Started on intensive intramuscular replacement  Orthostatic blood pressure remains positive  Patient currently in no distress.  Ordered dose of Solu-Medrol to sensitize adrenergic receptors in the vasculature  Continue all other medications.  Continue orthostatic vitals and attempts to ambulate with safety precautions    Consultants/Specialty  None    Code Status  Full code    Disposition  Difficult discharge    Review of Systems  Review of Systems   Constitutional: Negative for chills, fever and weight loss.   HENT: Negative for ear pain, hearing loss and tinnitus.    Eyes: Negative for blurred vision, double vision and photophobia.   Respiratory: Negative for cough, hemoptysis and sputum production.    Cardiovascular: Negative for chest pain, palpitations and orthopnea.   Gastrointestinal: Negative for heartburn, nausea and vomiting.   Genitourinary: Negative for dysuria, frequency and urgency.   Musculoskeletal: Negative for back pain, myalgias and neck pain.   Skin: Negative for itching and rash.   Neurological: Positive for dizziness. Negative for tingling and headaches.   Psychiatric/Behavioral: Negative for depression, substance abuse and suicidal ideas.        Physical Exam  Temp:  [36.1 °C (97 °F)-36.7 °C (98.1 °F)] 36.7 °C (98.1 °F)  Pulse:  [60-80] 80  Resp:  [16-18] 17  BP: (126-161)/(64-85) 134/80    Physical Exam   Constitutional: He is oriented to person, place, and time. He appears well-developed.    HENT:   Head: Normocephalic and atraumatic.   Eyes: Pupils are equal, round, and reactive to light. EOM are normal.   Cardiovascular: Normal rate and regular rhythm.    Pulmonary/Chest: Effort normal and breath sounds normal. No respiratory distress.   Abdominal: He exhibits no distension.   Musculoskeletal: Normal range of motion. He exhibits edema.   Right hand swelling, erythema, tenderness significantly improved   Neurological: He is alert and oriented to person, place, and time. He displays tremor. Coordination abnormal.   Positional tremor   Skin: Skin is warm and dry.   Nursing note and vitals reviewed.      Fluids    Intake/Output Summary (Last 24 hours) at 01/03/19 1455  Last data filed at 01/03/19 1300   Gross per 24 hour   Intake              850 ml   Output             2000 ml   Net            -1150 ml       Laboratory  Recent Labs      01/02/19   0040  01/03/19   0019   WBC  6.7  7.5   RBC  4.79  4.82   HEMOGLOBIN  13.3*  13.4*   HEMATOCRIT  41.1*  41.2*   MCV  85.8  85.5   MCH  27.8  27.8   MCHC  32.4*  32.5*   RDW  43.4  43.8   PLATELETCT  182  197   MPV  10.4  10.3     Recent Labs      01/02/19   0040  01/03/19   0019   SODIUM  135  135   POTASSIUM  3.9  4.2   CHLORIDE  100  102   CO2  28  27   GLUCOSE  100*  97   BUN  22  31*   CREATININE  0.93  0.78   CALCIUM  9.2  9.7                   Imaging  MR-BRAIN-W/O   Final Result      1.  MRI of the brain without contrast within normal limits for age with mild white matter changes.   2.  Chronic appearing sinus disease   3.  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 This does not have an aggressive appearance.      US-EXTREMITY VENOUS LOWER BILAT   Final Result      CT-HAND WITH RIGHT   Final Result      1.  Edema in the dorsum of the forearm and hand without a discrete fluid collection to suggest abscess is identified.      US-CAROTID DOPPLER BILAT   Final Result      DX-LUMBAR SPINE-2 OR 3 VIEWS   Final  Result         Stable compression deformity in the L1 vertebral body.      CT-CTA CHEST PULMONARY ARTERY W/ RECONS   Final Result         1. No CT evidence of pulmonary embolism.      2. Old right rib fractures.      3. Cholelithiasis. Enlarged spleen with granulomas.      4. Mild superior endplate compression deformity of L1 could be acute. Correlate with point tenderness. No retropulsion. No malalignment.      CT-CSPINE WITHOUT PLUS RECONS   Final Result         1. No acute fracture from C1 through T3 is visualized.         CT-HEAD W/O   Final Result         1. No acute intracranial abnormality. No evidence of acute intracranial hemorrhage or mass lesion.               DX-THORACIC SPINE-WITH SWIMMERS VIEW   Final Result      No acute thoracic spine fracture or subluxation identified.      DX-CHEST-PORTABLE (1 VIEW)   Final Result      No acute cardiac or pulmonary abnormality is noted.           Assessment/Plan  * Syncope- (present on admission)   Assessment & Plan    Likely due to orthostasis.    CT of the head unremarkable  Carotid Doppler did not reveal significant stenosis  Given vertigo-like symptoms today, poor coordination and reported blurred vision, will order MRI of the brain to rule out organic abnormality     Cellulitis of finger of right hand   Assessment & Plan    CT of the right hand did not show abscess.  Swelling, erythema, tenderness to palpation. - -We will was treated  Clindamycin       Lumbar compression fracture (HCC)   Assessment & Plan    Pain management as needed.    Neurosurgery recommended TLSO brace.   PT OT recommended home health.  Referral will be placed     Orthostatic hypotension- (present on admission)   Assessment & Plan    Possibly secondary to dysautonomia secondary to diabetes.  Midodrine  Mestinon, dose increased  Fludrocortisone, maximal dose  PT/OT  Ambulate briefly frequently   Safety counseling  4 wheel walker ordered per PT OT recommendation  Added  metoclopramide  Ordered 1 single dose of Solu-Medrol  Vitamin B12 intramuscular injection for low vitamin B12 level  Continue PT OT, daily orthostatics vitals, reevaluate  Monitor blood pressure     Vitamin B12 deficiency- (present on admission)   Assessment & Plan    Started intramuscular injections for severe B12 deficiency     Chronic diastolic heart failure (HCC)- (present on admission)   Assessment & Plan    Without current exacerbation.    Chronic lower extremity edema     Venous stasis dermatitis of both lower extremities- (present on admission)   Assessment & Plan    Monitor.      Bilateral leg edema- (present on admission)   Assessment & Plan    Appears to have chronic edematous changes.  Monitor.  Compression stockings should help with orthostatic dizziness as well     Essential hypertension- (present on admission)   Assessment & Plan    Controlled with current medication regimen.    He is on amiodarone.  Lisinopril and Lasix was not started on admission.  Blood pressure is fairly controlled.  Lisinopril restarted  Blood pressure fluctuates.  Remains orthostatic     Mixed hyperlipidemia- (present on admission)   Assessment & Plan    Continue current therapy           VTE prophylaxis: Heparin

## 2019-01-04 LAB
ANION GAP SERPL CALC-SCNC: 8 MMOL/L (ref 0–11.9)
BUN SERPL-MCNC: 24 MG/DL (ref 8–22)
CALCIUM SERPL-MCNC: 9.7 MG/DL (ref 8.5–10.5)
CHLORIDE SERPL-SCNC: 102 MMOL/L (ref 96–112)
CO2 SERPL-SCNC: 24 MMOL/L (ref 20–33)
CREAT SERPL-MCNC: 0.65 MG/DL (ref 0.5–1.4)
GLUCOSE SERPL-MCNC: 149 MG/DL (ref 65–99)
POTASSIUM SERPL-SCNC: 4.1 MMOL/L (ref 3.6–5.5)
SODIUM SERPL-SCNC: 134 MMOL/L (ref 135–145)

## 2019-01-04 PROCEDURE — 80048 BASIC METABOLIC PNL TOTAL CA: CPT

## 2019-01-04 PROCEDURE — A9270 NON-COVERED ITEM OR SERVICE: HCPCS | Performed by: INTERNAL MEDICINE

## 2019-01-04 PROCEDURE — 36415 COLL VENOUS BLD VENIPUNCTURE: CPT

## 2019-01-04 PROCEDURE — A9270 NON-COVERED ITEM OR SERVICE: HCPCS | Performed by: HOSPITALIST

## 2019-01-04 PROCEDURE — G0378 HOSPITAL OBSERVATION PER HR: HCPCS

## 2019-01-04 PROCEDURE — 99225 PR SUBSEQUENT OBSERVATION CARE,LEVEL II: CPT | Performed by: INTERNAL MEDICINE

## 2019-01-04 PROCEDURE — 700102 HCHG RX REV CODE 250 W/ 637 OVERRIDE(OP): Performed by: INTERNAL MEDICINE

## 2019-01-04 PROCEDURE — 700102 HCHG RX REV CODE 250 W/ 637 OVERRIDE(OP): Performed by: HOSPITALIST

## 2019-01-04 RX ORDER — MIDODRINE HYDROCHLORIDE 5 MG/1
5 TABLET ORAL 3 TIMES DAILY
Status: DISCONTINUED | OUTPATIENT
Start: 2019-01-04 | End: 2019-01-07

## 2019-01-04 RX ADMIN — METOCLOPRAMIDE HYDROCHLORIDE 10 MG: 10 TABLET ORAL at 05:22

## 2019-01-04 RX ADMIN — RIVAROXABAN 20 MG: 20 TABLET, FILM COATED ORAL at 17:44

## 2019-01-04 RX ADMIN — MULTIPLE VITAMINS W/ MINERALS TAB 1 TABLET: TAB at 05:22

## 2019-01-04 RX ADMIN — MIDODRINE HYDROCHLORIDE 5 MG: 5 TABLET ORAL at 17:44

## 2019-01-04 RX ADMIN — FLUOXETINE HYDROCHLORIDE 20 MG: 20 CAPSULE ORAL at 05:22

## 2019-01-04 RX ADMIN — PYRIDOSTIGMINE BROMIDE 120 MG: 60 TABLET ORAL at 05:21

## 2019-01-04 RX ADMIN — FLUDROCORTISONE ACETATE 0.3 MG: 0.1 TABLET ORAL at 05:22

## 2019-01-04 RX ADMIN — PYRIDOSTIGMINE BROMIDE 120 MG: 60 TABLET ORAL at 17:44

## 2019-01-04 RX ADMIN — NYSTATIN: 100000 POWDER TOPICAL at 05:23

## 2019-01-04 RX ADMIN — PRAVASTATIN SODIUM 20 MG: 20 TABLET ORAL at 17:45

## 2019-01-04 RX ADMIN — Medication 400 MG: at 05:22

## 2019-01-04 RX ADMIN — OXYCODONE HYDROCHLORIDE AND ACETAMINOPHEN 500 MG: 500 TABLET ORAL at 05:22

## 2019-01-04 RX ADMIN — PYRIDOSTIGMINE BROMIDE 120 MG: 60 TABLET ORAL at 11:41

## 2019-01-04 RX ADMIN — MIDODRINE HYDROCHLORIDE 10 MG: 5 TABLET ORAL at 05:22

## 2019-01-04 RX ADMIN — MIDODRINE HYDROCHLORIDE 5 MG: 5 TABLET ORAL at 11:41

## 2019-01-04 RX ADMIN — METOCLOPRAMIDE HYDROCHLORIDE 10 MG: 10 TABLET ORAL at 11:41

## 2019-01-04 RX ADMIN — AMIODARONE HYDROCHLORIDE 50 MG: 200 TABLET ORAL at 05:22

## 2019-01-04 RX ADMIN — METOCLOPRAMIDE HYDROCHLORIDE 10 MG: 10 TABLET ORAL at 17:44

## 2019-01-04 RX ADMIN — NYSTATIN: 100000 POWDER TOPICAL at 17:43

## 2019-01-04 RX ADMIN — LISINOPRIL 5 MG: 5 TABLET ORAL at 05:23

## 2019-01-04 RX ADMIN — ACETAMINOPHEN 650 MG: 325 TABLET, FILM COATED ORAL at 20:47

## 2019-01-04 ASSESSMENT — PAIN SCALES - GENERAL
PAINLEVEL_OUTOF10: 0
PAINLEVEL_OUTOF10: 6

## 2019-01-04 ASSESSMENT — ENCOUNTER SYMPTOMS
DEPRESSION: 0
MYALGIAS: 0
SPUTUM PRODUCTION: 0
WEIGHT LOSS: 0
COUGH: 0
HEMOPTYSIS: 0
NECK PAIN: 0
BLURRED VISION: 0
PALPITATIONS: 0
VOMITING: 0
DIZZINESS: 1
FEVER: 0
BACK PAIN: 0
HEADACHES: 0
PHOTOPHOBIA: 0
CHILLS: 0
DOUBLE VISION: 0
TINGLING: 0
ORTHOPNEA: 0
NAUSEA: 0
HEARTBURN: 0

## 2019-01-04 ASSESSMENT — LIFESTYLE VARIABLES: SUBSTANCE_ABUSE: 0

## 2019-01-04 NOTE — PROGRESS NOTES
Assumed care of pt at 1015. Received report from ELISA Mathis. Pt A&Ox4. Assessment complete, agreement in assessment by ELISA Mathis in flowsheets. Labs reviewed. Pt denies pain at this time. Pt transferred from [facility] to [facility]. All patient belongings with patient at bedside, pt significant other at bedside, water provided. Pt and RN discussed plan of care. Pt questions answered. Pt needs are met at this time. Bed in lowest and locked position. Call light within reach. Upper bed rails up. Hourly rounding in place.

## 2019-01-04 NOTE — PROGRESS NOTES
Patient transported off floor via [place] to [place] [id number].  All belongings sent with patient

## 2019-01-04 NOTE — PROGRESS NOTES
Hospital Medicine Daily Progress Note    Date of Service  1/4/2019    Chief Complaint  53-year-old male with history of CHF, paroxysmal A. fib, diabetes neuropathy, dyslipidemia, hypertension, noncompliance, persistent orthostatic dizziness/hypotension, presented with complaints of orthostatic syncopal and dizziness, back pain.  Found to have a L1 fracture from ground-level fall.  Neurosurgery consulted  Patient started on antibiotic for right hand cellulitis      Interval Problem Update    Patient is in no distress.  Blood pressure slowly trending up, patient still has orthostatic dizziness and hypotension   lower extremity edema getting a little bit worse.      Consultants/Specialty  None    Code Status  Full code    Disposition  Difficult discharge    Review of Systems  Review of Systems   Constitutional: Negative for chills, fever and weight loss.   HENT: Negative for ear pain, hearing loss and tinnitus.    Eyes: Negative for blurred vision, double vision and photophobia.   Respiratory: Negative for cough, hemoptysis and sputum production.    Cardiovascular: Positive for leg swelling. Negative for chest pain, palpitations and orthopnea.   Gastrointestinal: Negative for heartburn, nausea and vomiting.   Genitourinary: Negative for dysuria, frequency and urgency.   Musculoskeletal: Negative for back pain, myalgias and neck pain.   Skin: Negative for itching and rash.   Neurological: Positive for dizziness. Negative for tingling and headaches.   Psychiatric/Behavioral: Negative for depression, substance abuse and suicidal ideas.        Physical Exam  Temp:  [35.8 °C (96.5 °F)-36.8 °C (98.2 °F)] 35.8 °C (96.5 °F)  Pulse:  [60-69] 69  Resp:  [16-18] 17  BP: (135-174)/(62-91) 136/62    Physical Exam   Constitutional: He is oriented to person, place, and time. He appears well-developed.   HENT:   Head: Normocephalic and atraumatic.   Eyes: Pupils are equal, round, and reactive to light. EOM are normal.    Cardiovascular: Normal rate and regular rhythm.    Pulmonary/Chest: Effort normal and breath sounds normal. No respiratory distress.   Abdominal: He exhibits no distension.   Musculoskeletal: Normal range of motion. He exhibits edema.   Right hand swelling, erythema, tenderness significantly improved    Bilateral lower extremity edema   Neurological: He is alert and oriented to person, place, and time. He displays tremor. Coordination abnormal.   Positional tremor   Skin: Skin is warm and dry.   Nursing note and vitals reviewed.      Fluids    Intake/Output Summary (Last 24 hours) at 01/04/19 1324  Last data filed at 01/04/19 0431   Gross per 24 hour   Intake              400 ml   Output              795 ml   Net             -395 ml       Laboratory  Recent Labs      01/02/19   0040  01/03/19   0019   WBC  6.7  7.5   RBC  4.79  4.82   HEMOGLOBIN  13.3*  13.4*   HEMATOCRIT  41.1*  41.2*   MCV  85.8  85.5   MCH  27.8  27.8   MCHC  32.4*  32.5*   RDW  43.4  43.8   PLATELETCT  182  197   MPV  10.4  10.3     Recent Labs      01/02/19   0040  01/03/19   0019  01/04/19   0202   SODIUM  135  135  134*   POTASSIUM  3.9  4.2  4.1   CHLORIDE  100  102  102   CO2  28  27  24   GLUCOSE  100*  97  149*   BUN  22  31*  24*   CREATININE  0.93  0.78  0.65   CALCIUM  9.2  9.7  9.7                   Imaging  MR-BRAIN-W/O   Final Result      1.  MRI of the brain without contrast within normal limits for age with mild white matter changes.   2.  Chronic appearing sinus disease   3.  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 This does not have an aggressive appearance.      US-EXTREMITY VENOUS LOWER BILAT   Final Result      CT-HAND WITH RIGHT   Final Result      1.  Edema in the dorsum of the forearm and hand without a discrete fluid collection to suggest abscess is identified.      US-CAROTID DOPPLER BILAT   Final Result      DX-LUMBAR SPINE-2 OR 3 VIEWS   Final Result         Stable  compression deformity in the L1 vertebral body.      CT-CTA CHEST PULMONARY ARTERY W/ RECONS   Final Result         1. No CT evidence of pulmonary embolism.      2. Old right rib fractures.      3. Cholelithiasis. Enlarged spleen with granulomas.      4. Mild superior endplate compression deformity of L1 could be acute. Correlate with point tenderness. No retropulsion. No malalignment.      CT-CSPINE WITHOUT PLUS RECONS   Final Result         1. No acute fracture from C1 through T3 is visualized.         CT-HEAD W/O   Final Result         1. No acute intracranial abnormality. No evidence of acute intracranial hemorrhage or mass lesion.               DX-THORACIC SPINE-WITH SWIMMERS VIEW   Final Result      No acute thoracic spine fracture or subluxation identified.      DX-CHEST-PORTABLE (1 VIEW)   Final Result      No acute cardiac or pulmonary abnormality is noted.           Assessment/Plan  * Syncope- (present on admission)   Assessment & Plan    Likely due to orthostasis.    CT of the head unremarkable  Carotid Doppler did not reveal significant stenosis  Given vertigo-like symptoms today, poor coordination and reported blurred vision  MRI of the brain without acute abnormality     Cellulitis of finger of right hand   Assessment & Plan    CT of the right hand did not show abscess.  Swelling, erythema, tenderness to palpation. - -  clindamycin  Improved     Lumbar compression fracture (HCC)   Assessment & Plan    Pain management as needed.    Neurosurgery recommended TLSO brace.   PT OT recommended home health.    Nonetheless, patient deemed to be unsafe discharge due to orthostatic hypotension     Orthostatic hypotension- (present on admission)   Assessment & Plan    Possibly secondary to dysautonomia secondary to diabetes.  Midodrine  Mestinon, dose increased  Fludrocortisone, maximal dose  PT/OT  Ambulate briefly frequently   Safety counseling  4 wheel walker ordered per PT OT recommendation  Added  metoclopramide  Ordered 1 single dose of Solu-Medrol  Vitamin B12 intramuscular injection for low vitamin B12 level  Continue PT OT, daily orthostatics vitals, reevaluate  Monitor blood pressure  1/4 will reduce dose of Midrin due to resting blood pressure going up     Vitamin B12 deficiency- (present on admission)   Assessment & Plan    Started intramuscular injections       Chronic diastolic heart failure (HCC)- (present on admission)   Assessment & Plan    Without current exacerbation.    Chronic lower extremity edema -chronic venous insufficiency     Venous stasis dermatitis of both lower extremities- (present on admission)   Assessment & Plan    Monitor.      Bilateral leg edema- (present on admission)   Assessment & Plan    Appears to have chronic edematous changes.  Likely secondary to venous insufficiency.  Monitor.    States it was getting worse in the last 2 days.  Ultrasound for DVT was study was done and it was negative for DVT  Compression stockings should help with orthostatic dizziness as well     Essential hypertension- (present on admission)   Assessment & Plan    Controlled with current medication regimen.    He is on amiodarone.  Lisinopril and Lasix was not started on admission.  Blood pressure is fairly controlled.  Lisinopril restarted  Blood pressure fluctuates.  Remains orthostatic  Continue monitoring blood pressure  As blood pressure is trending up, will reduce dose of midodrine     Mixed hyperlipidemia- (present on admission)   Assessment & Plan    Continue current therapy           VTE prophylaxis: Heparin

## 2019-01-04 NOTE — PROGRESS NOTES
· 2 RN skin check complete.   · Devices in place N/A.  · Skin assessed under devices N/S.  · Confirmed pressure ulcers found on N/A.  · New potential pressure ulcers noted on N/A.   · The following interventions in place pt up to bathroom SBA, pt turns self in bed, moisturizer in use.    Pt has generalized edema to BLE, that are red and chris but cool to the touch. Redness noted underneath pannus, nystatin powder in use.

## 2019-01-04 NOTE — PROGRESS NOTES
Charge called to notify that patient has a medical bed.  Report given to receiving RNOrtiz.  All questions answered.

## 2019-01-04 NOTE — PROGRESS NOTES
Received report from previous shift. Plan of care discussed with patient. All concerns voiced at this time. Patient is A&O 4, bed alarm on.  Patient educated on the importance of calling if in need of assistance.  Patient verbalized understanding.  Bed controls on, bed locked and in lowest position, call light with patient. All fall precautions in place. Patient without pain at this time. Will continue to monitor for safety and comfort.

## 2019-01-04 NOTE — CARE PLAN
Problem: Safety  Goal: Will remain free from injury  Outcome: PROGRESSING AS EXPECTED  Pt. educated on fall precautions and instructed to use the call light for assistance. Pt. wearing non-slip socks. Upper side rails up. Appropriate signs posted. Call light and personal belongings within reach. Pt. has no other needs at this time.     Problem: Knowledge Deficit  Goal: Knowledge of disease process/condition, treatment plan, diagnostic tests, and medications will improve  Outcome: PROGRESSING AS EXPECTED  Educated pt on plan of care, treatment, medications and diagnosis. Pt verbalizes understanding and is compliant.

## 2019-01-05 PROCEDURE — 99225 PR SUBSEQUENT OBSERVATION CARE,LEVEL II: CPT | Performed by: INTERNAL MEDICINE

## 2019-01-05 PROCEDURE — A9270 NON-COVERED ITEM OR SERVICE: HCPCS | Performed by: INTERNAL MEDICINE

## 2019-01-05 PROCEDURE — A9270 NON-COVERED ITEM OR SERVICE: HCPCS | Performed by: HOSPITALIST

## 2019-01-05 PROCEDURE — G0378 HOSPITAL OBSERVATION PER HR: HCPCS

## 2019-01-05 PROCEDURE — 700102 HCHG RX REV CODE 250 W/ 637 OVERRIDE(OP): Performed by: HOSPITALIST

## 2019-01-05 PROCEDURE — 700102 HCHG RX REV CODE 250 W/ 637 OVERRIDE(OP): Performed by: INTERNAL MEDICINE

## 2019-01-05 PROCEDURE — 700111 HCHG RX REV CODE 636 W/ 250 OVERRIDE (IP): Performed by: INTERNAL MEDICINE

## 2019-01-05 RX ORDER — SODIUM CHLORIDE 1 G/1
1 TABLET ORAL
Status: DISCONTINUED | OUTPATIENT
Start: 2019-01-05 | End: 2019-02-27 | Stop reason: HOSPADM

## 2019-01-05 RX ORDER — GUAIFENESIN 600 MG/1
600 TABLET, EXTENDED RELEASE ORAL EVERY 12 HOURS
Status: COMPLETED | OUTPATIENT
Start: 2019-01-05 | End: 2019-01-07

## 2019-01-05 RX ADMIN — CYANOCOBALAMIN 1000 MCG: 1000 INJECTION INTRAMUSCULAR; SUBCUTANEOUS at 16:46

## 2019-01-05 RX ADMIN — PYRIDOSTIGMINE BROMIDE 120 MG: 60 TABLET ORAL at 05:20

## 2019-01-05 RX ADMIN — MIDODRINE HYDROCHLORIDE 5 MG: 5 TABLET ORAL at 11:58

## 2019-01-05 RX ADMIN — MIDODRINE HYDROCHLORIDE 5 MG: 5 TABLET ORAL at 16:47

## 2019-01-05 RX ADMIN — SODIUM CHLORIDE TAB 1 GM 1 G: 1 TAB at 16:47

## 2019-01-05 RX ADMIN — METOCLOPRAMIDE HYDROCHLORIDE 10 MG: 10 TABLET ORAL at 08:19

## 2019-01-05 RX ADMIN — MULTIPLE VITAMINS W/ MINERALS TAB 1 TABLET: TAB at 05:22

## 2019-01-05 RX ADMIN — GUAIFENESIN 600 MG: 600 TABLET, EXTENDED RELEASE ORAL at 16:47

## 2019-01-05 RX ADMIN — FLUDROCORTISONE ACETATE 0.3 MG: 0.1 TABLET ORAL at 05:21

## 2019-01-05 RX ADMIN — SENNOSIDES AND DOCUSATE SODIUM 2 TABLET: 8.6; 5 TABLET ORAL at 05:20

## 2019-01-05 RX ADMIN — PYRIDOSTIGMINE BROMIDE 120 MG: 60 TABLET ORAL at 11:57

## 2019-01-05 RX ADMIN — PYRIDOSTIGMINE BROMIDE 120 MG: 60 TABLET ORAL at 16:46

## 2019-01-05 RX ADMIN — AMIODARONE HYDROCHLORIDE 50 MG: 200 TABLET ORAL at 05:22

## 2019-01-05 RX ADMIN — METOCLOPRAMIDE HYDROCHLORIDE 10 MG: 10 TABLET ORAL at 11:58

## 2019-01-05 RX ADMIN — NYSTATIN: 100000 POWDER TOPICAL at 16:46

## 2019-01-05 RX ADMIN — RIVAROXABAN 20 MG: 20 TABLET, FILM COATED ORAL at 16:46

## 2019-01-05 RX ADMIN — PRAVASTATIN SODIUM 20 MG: 20 TABLET ORAL at 16:46

## 2019-01-05 RX ADMIN — GUAIFENESIN 600 MG: 600 TABLET, EXTENDED RELEASE ORAL at 08:23

## 2019-01-05 RX ADMIN — NYSTATIN: 100000 POWDER TOPICAL at 05:24

## 2019-01-05 RX ADMIN — OXYCODONE HYDROCHLORIDE AND ACETAMINOPHEN 500 MG: 500 TABLET ORAL at 05:20

## 2019-01-05 RX ADMIN — LISINOPRIL 5 MG: 5 TABLET ORAL at 05:21

## 2019-01-05 RX ADMIN — Medication 400 MG: at 05:22

## 2019-01-05 RX ADMIN — FLUOXETINE HYDROCHLORIDE 20 MG: 20 CAPSULE ORAL at 05:21

## 2019-01-05 RX ADMIN — MIDODRINE HYDROCHLORIDE 5 MG: 5 TABLET ORAL at 05:21

## 2019-01-05 ASSESSMENT — ENCOUNTER SYMPTOMS
NAUSEA: 0
FEVER: 0
NECK PAIN: 0
HEADACHES: 0
DIZZINESS: 1
ABDOMINAL PAIN: 0
SHORTNESS OF BREATH: 0
COUGH: 0
BLURRED VISION: 0

## 2019-01-05 ASSESSMENT — PAIN SCALES - GENERAL
PAINLEVEL_OUTOF10: 0
PAINLEVEL_OUTOF10: 0

## 2019-01-05 NOTE — PROGRESS NOTES
Received report from day shift RN. Pt is resting comfortably in bed. NAD, VSS. Safety checks assessed & call bell within reach. RN assumes POC

## 2019-01-05 NOTE — CARE PLAN
Problem: Venous Thromboembolism (VTW)/Deep Vein Thrombosis (DVT) Prevention:  Goal: Patient will participate in Venous Thrombosis (VTE)/Deep Vein Thrombosis (DVT)Prevention Measures  Outcome: PROGRESSING AS EXPECTED  Pt refusing SCDs, compression stockings ordered this am. Pt educated on mobility and thrombus formation in the hospital. Pt verbalizes understanding. Pt to walk with staff today.     Problem: Bowel/Gastric:  Goal: Normal bowel function is maintained or improved  Outcome: PROGRESSING AS EXPECTED  Pt reports last bowel movement yesterday, 1/4. Pt on scheduled senna with PRN bowel care. Pt educated to monitor bowel movements and hydrate to prevent constipation.

## 2019-01-05 NOTE — PROGRESS NOTES
Hospital Medicine Daily Progress Note    Date of Service  1/5/2019    Chief Complaint  53 y.o. male admitted 12/28/2018 with syncope.    Hospital Course    PMH afib s/p cardioversion, CHD, DM, HTN, HLD who presented with syncope while walking and back pain. He was found with L1 compression fracture. Dr Pepper with NSG recommended TLSO and management for osteoporosis. Had had CT head and carotid doppler that was unremarkable. Recent TTE showed normal EF with mild MR and TR. Telemetry did not show any abnormalities. MRI brain showed chronic changes. TSH was normal. He has orthostatic hypotension that's been difficult to correct despite multiple medications. He has chronic leg swelling that has been worse since lasix stopped for hypotension. Doppler was negative for DVT bilaterally. He was noted to have right hand cellulitis and treated with clindamycin.        Interval Problem Update  Florinef, reglan, midodrine, mestinon  He is very dizzy when he stands up, has not been able to ambulate far.  Compression stockings and salt tabs added. Rechecking orthostatics today  Congested, mucinex ordered    Consultants/Specialty  None    Code Status  Full    Disposition  F/u [facility] ordered  homeless    Review of Systems  Review of Systems   Constitutional: Negative for fever.   HENT: Positive for congestion.    Eyes: Negative for blurred vision.   Respiratory: Negative for cough and shortness of breath.    Cardiovascular: Positive for leg swelling. Negative for chest pain.   Gastrointestinal: Negative for abdominal pain and nausea.   Musculoskeletal: Negative for neck pain.   Neurological: Positive for dizziness. Negative for headaches.        Physical Exam  Temp:  [36.1 °C (97 °F)-36.6 °C (97.9 °F)] 36.6 °C (97.8 °F)  Pulse:  [55-65] 60  Resp:  [16-17] 16  BP: (116-157)/(69-81) 157/81    Physical Exam   Constitutional: He is oriented to person, place, and time. He appears well-developed and well-nourished.    HENT:   Head: Normocephalic.   Mouth/Throat: Oropharynx is clear and moist.   Eyes: Conjunctivae are normal. Right eye exhibits no discharge. Left eye exhibits no discharge.   Cardiovascular: Normal rate and regular rhythm.    No murmur heard.  Pulmonary/Chest: Effort normal. He has no wheezes. He has no rales.   Abdominal: Soft. There is no tenderness. There is no rebound.   Musculoskeletal: He exhibits edema.   Neurological: He is alert and oriented to person, place, and time.   Skin: Skin is warm and dry.   Nursing note and vitals reviewed.      Fluids    Intake/Output Summary (Last 24 hours) at 01/05/19 1209  Last data filed at 01/05/19 1057   Gross per 24 hour   Intake              480 ml   Output             1450 ml   Net             -970 ml       Laboratory  Recent Labs      01/03/19   0019   WBC  7.5   RBC  4.82   HEMOGLOBIN  13.4*   HEMATOCRIT  41.2*   MCV  85.5   MCH  27.8   MCHC  32.5*   RDW  43.8   PLATELETCT  197   MPV  10.3     Recent Labs      01/03/19   0019  01/04/19   0202   SODIUM  135  134*   POTASSIUM  4.2  4.1   CHLORIDE  102  102   CO2  27  24   GLUCOSE  97  149*   BUN  31*  24*   CREATININE  0.78  0.65   CALCIUM  9.7  9.7                   Imaging  MR-BRAIN-W/O   Final Result      1.  MRI of the brain without contrast within normal limits for age with mild white matter changes.   2.  Chronic appearing sinus disease   3.  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 This does not have an aggressive appearance.      US-EXTREMITY VENOUS LOWER BILAT   Final Result      CT-HAND WITH RIGHT   Final Result      1.  Edema in the dorsum of the forearm and hand without a discrete fluid collection to suggest abscess is identified.      US-CAROTID DOPPLER BILAT   Final Result      DX-LUMBAR SPINE-2 OR 3 VIEWS   Final Result         Stable compression deformity in the L1 vertebral body.      CT-CTA CHEST PULMONARY ARTERY W/ RECONS   Final Result         1. No  CT evidence of pulmonary embolism.      2. Old right rib fractures.      3. Cholelithiasis. Enlarged spleen with granulomas.      4. Mild superior endplate compression deformity of L1 could be acute. Correlate with point tenderness. No retropulsion. No malalignment.      CT-CSPINE WITHOUT PLUS RECONS   Final Result         1. No acute fracture from C1 through T3 is visualized.         CT-HEAD W/O   Final Result         1. No acute intracranial abnormality. No evidence of acute intracranial hemorrhage or mass lesion.               DX-THORACIC SPINE-WITH SWIMMERS VIEW   Final Result      No acute thoracic spine fracture or subluxation identified.      DX-CHEST-PORTABLE (1 VIEW)   Final Result      No acute cardiac or pulmonary abnormality is noted.           Assessment/Plan  * Orthostatic hypotension- (present on admission)   Assessment & Plan    Possibly secondary to dysautonomia secondary to diabetes.  Ongoing despite midodrine, florinef, mestinon, reglan, dose of vit B12 and solumedrol  Midodrine dose was decreased due to elevated resting BP  Added compression stockings and salt tabs  Check orthostatics daily  PTOT     Cellulitis of finger of right hand   Assessment & Plan    CT of the right hand did not show abscess.  Swelling, erythema, tenderness to palpation. - -  clindamycin  Improving     Lumbar compression fracture (HCC)   Assessment & Plan    Pain management as needed.    Neurosurgery recommended TLSO brace.   PT OT recommended home health.    F/u with [facility]     Vitamin B12 deficiency- (present on admission)   Assessment & Plan    Started intramuscular injections       Chronic diastolic heart failure (HCC)- (present on admission)   Assessment & Plan    Without current exacerbation.    Chronic lower extremity edema -chronic venous insufficiency     Venous stasis dermatitis of both lower extremities- (present on admission)   Assessment & Plan    Holding lasix  Compression stockings     Bilateral leg  edema- (present on admission)   Assessment & Plan    Appears to have chronic edematous changes.  Likely secondary to venous insufficiency.    Recent TTE showed intact EF  Lasix has been held for orthostatic hypotension  Ultrasound for DVT was study was done and it was negative for DVT  Compression stockings ordered     Syncope- (present on admission)   Assessment & Plan    Likely due to orthostasis.    CT of the head unremarkable. MRI brain showed chronic changes  Carotid Doppler did not reveal significant stenosis  Recent TTE showed normal EF, mild TR and MR  PTOT     Paroxysmal A-fib (HCC)- (present on admission)   Assessment & Plan    Continue xarelto and amiodarone  Sinus rhythm     Essential hypertension- (present on admission)   Assessment & Plan    Lasix held for orthostatic hypotension  Midodrine reduced for elevated resting BP     Mixed hyperlipidemia- (present on admission)   Assessment & Plan    Continue current therapy           VTE prophylaxis: xarelto

## 2019-01-06 LAB
ANION GAP SERPL CALC-SCNC: 7 MMOL/L (ref 0–11.9)
BASOPHILS # BLD AUTO: 0.6 % (ref 0–1.8)
BASOPHILS # BLD: 0.05 K/UL (ref 0–0.12)
BUN SERPL-MCNC: 23 MG/DL (ref 8–22)
CALCIUM SERPL-MCNC: 9 MG/DL (ref 8.5–10.5)
CHLORIDE SERPL-SCNC: 102 MMOL/L (ref 96–112)
CO2 SERPL-SCNC: 28 MMOL/L (ref 20–33)
CREAT SERPL-MCNC: 0.85 MG/DL (ref 0.5–1.4)
EOSINOPHIL # BLD AUTO: 0.32 K/UL (ref 0–0.51)
EOSINOPHIL NFR BLD: 3.8 % (ref 0–6.9)
ERYTHROCYTE [DISTWIDTH] IN BLOOD BY AUTOMATED COUNT: 43.7 FL (ref 35.9–50)
GLUCOSE SERPL-MCNC: 106 MG/DL (ref 65–99)
HCT VFR BLD AUTO: 43.8 % (ref 42–52)
HGB BLD-MCNC: 14.3 G/DL (ref 14–18)
IMM GRANULOCYTES # BLD AUTO: 0.04 K/UL (ref 0–0.11)
IMM GRANULOCYTES NFR BLD AUTO: 0.5 % (ref 0–0.9)
LYMPHOCYTES # BLD AUTO: 3.27 K/UL (ref 1–4.8)
LYMPHOCYTES NFR BLD: 38.8 % (ref 22–41)
MAGNESIUM SERPL-MCNC: 2 MG/DL (ref 1.5–2.5)
MCH RBC QN AUTO: 27.9 PG (ref 27–33)
MCHC RBC AUTO-ENTMCNC: 32.6 G/DL (ref 33.7–35.3)
MCV RBC AUTO: 85.5 FL (ref 81.4–97.8)
MONOCYTES # BLD AUTO: 0.6 K/UL (ref 0–0.85)
MONOCYTES NFR BLD AUTO: 7.1 % (ref 0–13.4)
NEUTROPHILS # BLD AUTO: 4.14 K/UL (ref 1.82–7.42)
NEUTROPHILS NFR BLD: 49.2 % (ref 44–72)
NRBC # BLD AUTO: 0 K/UL
NRBC BLD-RTO: 0 /100 WBC
PLATELET # BLD AUTO: 202 K/UL (ref 164–446)
PMV BLD AUTO: 10.2 FL (ref 9–12.9)
POTASSIUM SERPL-SCNC: 4.1 MMOL/L (ref 3.6–5.5)
RBC # BLD AUTO: 5.12 M/UL (ref 4.7–6.1)
SODIUM SERPL-SCNC: 137 MMOL/L (ref 135–145)
WBC # BLD AUTO: 8.4 K/UL (ref 4.8–10.8)

## 2019-01-06 PROCEDURE — 700102 HCHG RX REV CODE 250 W/ 637 OVERRIDE(OP): Performed by: INTERNAL MEDICINE

## 2019-01-06 PROCEDURE — 80048 BASIC METABOLIC PNL TOTAL CA: CPT

## 2019-01-06 PROCEDURE — A9270 NON-COVERED ITEM OR SERVICE: HCPCS | Performed by: INTERNAL MEDICINE

## 2019-01-06 PROCEDURE — A9270 NON-COVERED ITEM OR SERVICE: HCPCS | Performed by: HOSPITALIST

## 2019-01-06 PROCEDURE — 99225 PR SUBSEQUENT OBSERVATION CARE,LEVEL II: CPT | Performed by: INTERNAL MEDICINE

## 2019-01-06 PROCEDURE — 85025 COMPLETE CBC W/AUTO DIFF WBC: CPT

## 2019-01-06 PROCEDURE — 83735 ASSAY OF MAGNESIUM: CPT

## 2019-01-06 PROCEDURE — 36415 COLL VENOUS BLD VENIPUNCTURE: CPT

## 2019-01-06 PROCEDURE — 700102 HCHG RX REV CODE 250 W/ 637 OVERRIDE(OP): Performed by: HOSPITALIST

## 2019-01-06 PROCEDURE — G0378 HOSPITAL OBSERVATION PER HR: HCPCS

## 2019-01-06 RX ADMIN — RIVAROXABAN 20 MG: 20 TABLET, FILM COATED ORAL at 17:23

## 2019-01-06 RX ADMIN — AMIODARONE HYDROCHLORIDE 50 MG: 200 TABLET ORAL at 04:51

## 2019-01-06 RX ADMIN — SODIUM CHLORIDE TAB 1 GM 1 G: 1 TAB at 11:14

## 2019-01-06 RX ADMIN — SODIUM CHLORIDE TAB 1 GM 1 G: 1 TAB at 07:56

## 2019-01-06 RX ADMIN — NYSTATIN: 100000 POWDER TOPICAL at 04:53

## 2019-01-06 RX ADMIN — GUAIFENESIN 600 MG: 600 TABLET, EXTENDED RELEASE ORAL at 04:51

## 2019-01-06 RX ADMIN — FLUDROCORTISONE ACETATE 0.3 MG: 0.1 TABLET ORAL at 04:52

## 2019-01-06 RX ADMIN — SODIUM CHLORIDE TAB 1 GM 1 G: 1 TAB at 17:23

## 2019-01-06 RX ADMIN — GUAIFENESIN 600 MG: 600 TABLET, EXTENDED RELEASE ORAL at 17:23

## 2019-01-06 RX ADMIN — OXYCODONE HYDROCHLORIDE AND ACETAMINOPHEN 500 MG: 500 TABLET ORAL at 04:51

## 2019-01-06 RX ADMIN — MIDODRINE HYDROCHLORIDE 5 MG: 5 TABLET ORAL at 11:14

## 2019-01-06 RX ADMIN — PYRIDOSTIGMINE BROMIDE 120 MG: 60 TABLET ORAL at 11:14

## 2019-01-06 RX ADMIN — Medication 400 MG: at 04:51

## 2019-01-06 RX ADMIN — MULTIPLE VITAMINS W/ MINERALS TAB 1 TABLET: TAB at 04:54

## 2019-01-06 RX ADMIN — MIDODRINE HYDROCHLORIDE 5 MG: 5 TABLET ORAL at 17:23

## 2019-01-06 RX ADMIN — PYRIDOSTIGMINE BROMIDE 120 MG: 60 TABLET ORAL at 04:53

## 2019-01-06 RX ADMIN — FLUOXETINE HYDROCHLORIDE 20 MG: 20 CAPSULE ORAL at 04:54

## 2019-01-06 RX ADMIN — PRAVASTATIN SODIUM 20 MG: 20 TABLET ORAL at 17:23

## 2019-01-06 RX ADMIN — PYRIDOSTIGMINE BROMIDE 120 MG: 60 TABLET ORAL at 17:23

## 2019-01-06 RX ADMIN — MIDODRINE HYDROCHLORIDE 5 MG: 5 TABLET ORAL at 04:53

## 2019-01-06 ASSESSMENT — ENCOUNTER SYMPTOMS
NECK PAIN: 0
FEVER: 0
COUGH: 0
DIZZINESS: 1
BLURRED VISION: 0
HEADACHES: 0
ABDOMINAL PAIN: 0
NAUSEA: 0
SHORTNESS OF BREATH: 0

## 2019-01-06 ASSESSMENT — PAIN SCALES - GENERAL: PAINLEVEL_OUTOF10: 0

## 2019-01-06 NOTE — CARE PLAN
Received alert and oriented x 4. Check vitals sign and recorded accordingly and due med given per MAR. Monitor sign and symptoms of respiratory distress and treatment given accordingly per MAR.Medicated per MAR and reassessed every 2 hours per protocol. Call light within reach. Bed alarm refused despite health teaching given. Calls appropriately. Needs attended. Will continue to monitor./69   Pulse 67   Temp 36.2 °C (97.2 °F) (Temporal)   Resp 16   Ht 1.829 m (6')   Wt 111.1 kg (244 lb 14.9 oz)   SpO2 93%   BMI 33.22 kg/m² .

## 2019-01-06 NOTE — CARE PLAN
Problem: Safety  Goal: Will remain free from falls    Intervention: Implement fall precautions   01/05/19 2122   OTHER   Environmental Precautions Treaded Slipper Socks on Patient;Personal Belongings, Wastebasket, [employer] etc. in Easy Reach;Transferred to Stronger Side;Report Given to Other Health Care Providers Regarding Fall Risk;Bed in Low Position;Communication Sign for Patients & Families   Bed Alarm Patient Educated Regarding Fall Risk and Need for Bed Alarm, Understands and Continues to Refuse         Problem: Discharge Barriers/Planning  Goal: Patient's continuum of care needs will be met    Intervention: Involve patient and significant other/support system in setting and prioritizing goals for hospital stay and discharge  Possible discharge home with [facility] when medically stable.

## 2019-01-06 NOTE — CARE PLAN
Problem: Pain Management  Goal: Pain level will decrease to patient's comfort goal  Outcome: PROGRESSING AS EXPECTED  No complaints of pain. Pt educated on pain rating scale and when to take pain medication.     Problem: Safety  Goal: Will remain free from injury  Outcome: PROGRESSING AS EXPECTED  Pt with positive orthostatic blood pressures. Pt educated on safety with mobilization and to call for assistance with getting up out of bed. Pt verbalizes understanding.

## 2019-01-06 NOTE — PROGRESS NOTES
Hospital Medicine Daily Progress Note    Date of Service  1/6/2019    Chief Complaint  53 y.o. male admitted 12/28/2018 with syncope.    Hospital Course    PMH afib s/p cardioversion, CHD, DM, HTN, HLD who presented with syncope while walking and back pain. He was found with L1 compression fracture. Dr Pepper with NSG recommended TLSO and management for osteoporosis. Had had CT head and carotid doppler that was unremarkable. Recent TTE showed normal EF with mild MR and TR. Telemetry did not show any abnormalities. MRI brain showed chronic changes. TSH was normal. He has orthostatic hypotension that's been difficult to correct despite multiple medications. He has chronic leg swelling that has been worse since lasix stopped for hypotension. Doppler was negative for DVT bilaterally. He was noted to have right hand cellulitis and treated with clindamycin.        Interval Problem Update  He says dizziness is slowly improving over all.  Had dose of salt tabs last night, continue to today and repeat orthostatics    Consultants/Specialty  None    Code Status  Full    Disposition  F/u [facility]  Home health ordered  homeless    Review of Systems  Review of Systems   Constitutional: Negative for fever.   Eyes: Negative for blurred vision.   Respiratory: Negative for cough and shortness of breath.    Cardiovascular: Positive for leg swelling. Negative for chest pain.   Gastrointestinal: Negative for abdominal pain and nausea.   Musculoskeletal: Negative for neck pain.   Neurological: Positive for dizziness. Negative for headaches.        Physical Exam  Temp:  [36.2 °C (97.2 °F)-36.7 °C (98.1 °F)] 36.6 °C (97.9 °F)  Pulse:  [58-71] 65  Resp:  [16-18] 18  BP: (112-157)/(69-82) 152/77    Physical Exam   Constitutional: He is oriented to person, place, and time. He appears well-developed and well-nourished.   HENT:   Head: Normocephalic.   Mouth/Throat: Oropharynx is clear and moist.   Eyes: Conjunctivae are normal. Right eye  exhibits no discharge. Left eye exhibits no discharge.   Cardiovascular: Normal rate and regular rhythm.    No murmur heard.  Pulmonary/Chest: Effort normal. He has no wheezes. He has no rales.   Abdominal: Soft. There is no tenderness.   Musculoskeletal: He exhibits edema.   Neurological: He is alert and oriented to person, place, and time.   Skin: Skin is warm and dry.   Nursing note and vitals reviewed.      Fluids    Intake/Output Summary (Last 24 hours) at 01/06/19 1046  Last data filed at 01/06/19 0900   Gross per 24 hour   Intake              520 ml   Output             2700 ml   Net            -2180 ml       Laboratory  Recent Labs      01/06/19   0229   WBC  8.4   RBC  5.12   HEMOGLOBIN  14.3   HEMATOCRIT  43.8   MCV  85.5   MCH  27.9   MCHC  32.6*   RDW  43.7   PLATELETCT  202   MPV  10.2     Recent Labs      01/04/19   0202  01/06/19   0229   SODIUM  134*  137   POTASSIUM  4.1  4.1   CHLORIDE  102  102   CO2  24  28   GLUCOSE  149*  106*   BUN  24*  23*   CREATININE  0.65  0.85   CALCIUM  9.7  9.0                   Imaging  MR-BRAIN-W/O   Final Result      1.  MRI of the brain without contrast within normal limits for age with mild white matter changes.   2.  Chronic appearing sinus disease   3.  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 This does not have an aggressive appearance.      US-EXTREMITY VENOUS LOWER BILAT   Final Result      CT-HAND WITH RIGHT   Final Result      1.  Edema in the dorsum of the forearm and hand without a discrete fluid collection to suggest abscess is identified.      US-CAROTID DOPPLER BILAT   Final Result      DX-LUMBAR SPINE-2 OR 3 VIEWS   Final Result         Stable compression deformity in the L1 vertebral body.      CT-CTA CHEST PULMONARY ARTERY W/ RECONS   Final Result         1. No CT evidence of pulmonary embolism.      2. Old right rib fractures.      3. Cholelithiasis. Enlarged spleen with granulomas.      4. Mild  superior endplate compression deformity of L1 could be acute. Correlate with point tenderness. No retropulsion. No malalignment.      CT-CSPINE WITHOUT PLUS RECONS   Final Result         1. No acute fracture from C1 through T3 is visualized.         CT-HEAD W/O   Final Result         1. No acute intracranial abnormality. No evidence of acute intracranial hemorrhage or mass lesion.               DX-THORACIC SPINE-WITH SWIMMERS VIEW   Final Result      No acute thoracic spine fracture or subluxation identified.      DX-CHEST-PORTABLE (1 VIEW)   Final Result      No acute cardiac or pulmonary abnormality is noted.           Assessment/Plan  * Orthostatic hypotension- (present on admission)   Assessment & Plan    Possibly secondary to dysautonomia secondary to diabetes.  Ongoing despite midodrine, florinef, mestinon, reglan, dose of vit B12 and solumedrol  Midodrine dose was decreased due to elevated resting BP  Added compression stockings and salt tabs  Check orthostatics daily  PTOT     Cellulitis of finger of right hand   Assessment & Plan    CT of the right hand did not show abscess.  Swelling, erythema, tenderness to palpation. - -  clindamycin  Improving     Lumbar compression fracture (HCC)   Assessment & Plan    Pain management as needed.    Neurosurgery recommended TLSO brace.   PT OT recommended home health.    F/u with [facility]     Vitamin B12 deficiency- (present on admission)   Assessment & Plan    Started intramuscular injections       Chronic diastolic heart failure (HCC)- (present on admission)   Assessment & Plan    Without current exacerbation.    Chronic lower extremity edema -chronic venous insufficiency     Venous stasis dermatitis of both lower extremities- (present on admission)   Assessment & Plan    Holding lasix  Compression stockings     Bilateral leg edema- (present on admission)   Assessment & Plan    Appears to have chronic edematous changes.  Likely secondary to venous insufficiency.     Recent TTE showed intact EF  Lasix has been held for orthostatic hypotension  Ultrasound for DVT was study was done and it was negative for DVT  Compression stockings ordered     Syncope- (present on admission)   Assessment & Plan    Likely due to orthostasis.    CT of the head unremarkable. MRI brain showed chronic changes  Carotid Doppler did not reveal significant stenosis  Recent TTE showed normal EF, mild TR and MR  PTOT     Paroxysmal A-fib (HCC)- (present on admission)   Assessment & Plan    Continue xarelto and amiodarone  Sinus rhythm     Essential hypertension- (present on admission)   Assessment & Plan    Lasix held for orthostatic hypotension  Midodrine reduced for elevated resting BP     Mixed hyperlipidemia- (present on admission)   Assessment & Plan    Continue current therapy           VTE prophylaxis: xarelto

## 2019-01-07 ENCOUNTER — APPOINTMENT (OUTPATIENT)
Dept: RADIOLOGY | Facility: MEDICAL CENTER | Age: 54
End: 2019-01-07
Attending: INTERNAL MEDICINE
Payer: MEDICAID

## 2019-01-07 PROBLEM — J96.01 ACUTE RESPIRATORY FAILURE WITH HYPOXIA (HCC): Status: ACTIVE | Noted: 2019-01-07

## 2019-01-07 LAB
ANION GAP SERPL CALC-SCNC: 4 MMOL/L (ref 0–11.9)
BASOPHILS # BLD AUTO: 0.5 % (ref 0–1.8)
BASOPHILS # BLD: 0.05 K/UL (ref 0–0.12)
BUN SERPL-MCNC: 27 MG/DL (ref 8–22)
CALCIUM SERPL-MCNC: 8.9 MG/DL (ref 8.5–10.5)
CHLORIDE SERPL-SCNC: 104 MMOL/L (ref 96–112)
CO2 SERPL-SCNC: 28 MMOL/L (ref 20–33)
CREAT SERPL-MCNC: 0.75 MG/DL (ref 0.5–1.4)
EOSINOPHIL # BLD AUTO: 0.36 K/UL (ref 0–0.51)
EOSINOPHIL NFR BLD: 3.9 % (ref 0–6.9)
ERYTHROCYTE [DISTWIDTH] IN BLOOD BY AUTOMATED COUNT: 44.2 FL (ref 35.9–50)
GLUCOSE SERPL-MCNC: 111 MG/DL (ref 65–99)
HCT VFR BLD AUTO: 42.9 % (ref 42–52)
HGB BLD-MCNC: 14 G/DL (ref 14–18)
IMM GRANULOCYTES # BLD AUTO: 0.03 K/UL (ref 0–0.11)
IMM GRANULOCYTES NFR BLD AUTO: 0.3 % (ref 0–0.9)
LYMPHOCYTES # BLD AUTO: 3.4 K/UL (ref 1–4.8)
LYMPHOCYTES NFR BLD: 37.2 % (ref 22–41)
MCH RBC QN AUTO: 28 PG (ref 27–33)
MCHC RBC AUTO-ENTMCNC: 32.6 G/DL (ref 33.7–35.3)
MCV RBC AUTO: 85.8 FL (ref 81.4–97.8)
MONOCYTES # BLD AUTO: 0.68 K/UL (ref 0–0.85)
MONOCYTES NFR BLD AUTO: 7.4 % (ref 0–13.4)
NEUTROPHILS # BLD AUTO: 4.62 K/UL (ref 1.82–7.42)
NEUTROPHILS NFR BLD: 50.7 % (ref 44–72)
NRBC # BLD AUTO: 0 K/UL
NRBC BLD-RTO: 0 /100 WBC
PLATELET # BLD AUTO: 174 K/UL (ref 164–446)
PMV BLD AUTO: 11.7 FL (ref 9–12.9)
POTASSIUM SERPL-SCNC: 3.9 MMOL/L (ref 3.6–5.5)
RBC # BLD AUTO: 5 M/UL (ref 4.7–6.1)
SODIUM SERPL-SCNC: 136 MMOL/L (ref 135–145)
WBC # BLD AUTO: 9.1 K/UL (ref 4.8–10.8)

## 2019-01-07 PROCEDURE — 36415 COLL VENOUS BLD VENIPUNCTURE: CPT

## 2019-01-07 PROCEDURE — A9270 NON-COVERED ITEM OR SERVICE: HCPCS | Performed by: INTERNAL MEDICINE

## 2019-01-07 PROCEDURE — 71045 X-RAY EXAM CHEST 1 VIEW: CPT

## 2019-01-07 PROCEDURE — 85025 COMPLETE CBC W/AUTO DIFF WBC: CPT

## 2019-01-07 PROCEDURE — A9270 NON-COVERED ITEM OR SERVICE: HCPCS | Performed by: HOSPITALIST

## 2019-01-07 PROCEDURE — 700102 HCHG RX REV CODE 250 W/ 637 OVERRIDE(OP): Performed by: INTERNAL MEDICINE

## 2019-01-07 PROCEDURE — 99226 PR SUBSEQUENT OBSERVATION CARE,LEVEL III: CPT | Performed by: INTERNAL MEDICINE

## 2019-01-07 PROCEDURE — G0378 HOSPITAL OBSERVATION PER HR: HCPCS

## 2019-01-07 PROCEDURE — 700102 HCHG RX REV CODE 250 W/ 637 OVERRIDE(OP): Performed by: HOSPITALIST

## 2019-01-07 PROCEDURE — 80048 BASIC METABOLIC PNL TOTAL CA: CPT

## 2019-01-07 PROCEDURE — 700111 HCHG RX REV CODE 636 W/ 250 OVERRIDE (IP): Performed by: INTERNAL MEDICINE

## 2019-01-07 RX ORDER — PYRIDOSTIGMINE BROMIDE 60 MG/1
90 TABLET ORAL 3 TIMES DAILY
Status: DISCONTINUED | OUTPATIENT
Start: 2019-01-07 | End: 2019-02-27 | Stop reason: HOSPADM

## 2019-01-07 RX ORDER — MIDODRINE HYDROCHLORIDE 5 MG/1
10 TABLET ORAL 3 TIMES DAILY
Status: DISCONTINUED | OUTPATIENT
Start: 2019-01-07 | End: 2019-01-07

## 2019-01-07 RX ORDER — MIDODRINE HYDROCHLORIDE 5 MG/1
5 TABLET ORAL 3 TIMES DAILY
Status: DISCONTINUED | OUTPATIENT
Start: 2019-01-07 | End: 2019-01-07

## 2019-01-07 RX ORDER — MIDODRINE HYDROCHLORIDE 5 MG/1
10 TABLET ORAL 3 TIMES DAILY
Status: DISCONTINUED | OUTPATIENT
Start: 2019-01-07 | End: 2019-01-27

## 2019-01-07 RX ORDER — METOCLOPRAMIDE 10 MG/1
10 TABLET ORAL
Status: DISCONTINUED | OUTPATIENT
Start: 2019-01-07 | End: 2019-02-27 | Stop reason: HOSPADM

## 2019-01-07 RX ADMIN — GUAIFENESIN 600 MG: 600 TABLET, EXTENDED RELEASE ORAL at 05:06

## 2019-01-07 RX ADMIN — GUAIFENESIN 600 MG: 600 TABLET, EXTENDED RELEASE ORAL at 17:08

## 2019-01-07 RX ADMIN — PYRIDOSTIGMINE BROMIDE 120 MG: 60 TABLET ORAL at 05:06

## 2019-01-07 RX ADMIN — NYSTATIN: 100000 POWDER TOPICAL at 17:08

## 2019-01-07 RX ADMIN — CYANOCOBALAMIN 1000 MCG: 1000 INJECTION INTRAMUSCULAR; SUBCUTANEOUS at 14:49

## 2019-01-07 RX ADMIN — SODIUM CHLORIDE TAB 1 GM 1 G: 1 TAB at 11:44

## 2019-01-07 RX ADMIN — NYSTATIN: 100000 POWDER TOPICAL at 05:05

## 2019-01-07 RX ADMIN — AMIODARONE HYDROCHLORIDE 50 MG: 200 TABLET ORAL at 05:05

## 2019-01-07 RX ADMIN — SODIUM CHLORIDE TAB 1 GM 1 G: 1 TAB at 08:22

## 2019-01-07 RX ADMIN — PYRIDOSTIGMINE BROMIDE 90 MG: 60 TABLET ORAL at 17:06

## 2019-01-07 RX ADMIN — OXYCODONE HYDROCHLORIDE AND ACETAMINOPHEN 500 MG: 500 TABLET ORAL at 05:05

## 2019-01-07 RX ADMIN — METOCLOPRAMIDE HYDROCHLORIDE 10 MG: 10 TABLET ORAL at 17:07

## 2019-01-07 RX ADMIN — RIVAROXABAN 20 MG: 20 TABLET, FILM COATED ORAL at 17:08

## 2019-01-07 RX ADMIN — MULTIPLE VITAMINS W/ MINERALS TAB 1 TABLET: TAB at 05:06

## 2019-01-07 RX ADMIN — FLUOXETINE HYDROCHLORIDE 20 MG: 20 CAPSULE ORAL at 05:05

## 2019-01-07 RX ADMIN — MIDODRINE HYDROCHLORIDE 5 MG: 5 TABLET ORAL at 11:44

## 2019-01-07 RX ADMIN — MIDODRINE HYDROCHLORIDE 10 MG: 5 TABLET ORAL at 17:07

## 2019-01-07 RX ADMIN — Medication 400 MG: at 05:06

## 2019-01-07 RX ADMIN — SODIUM CHLORIDE TAB 1 GM 1 G: 1 TAB at 17:07

## 2019-01-07 RX ADMIN — FLUDROCORTISONE ACETATE 0.3 MG: 0.1 TABLET ORAL at 05:05

## 2019-01-07 RX ADMIN — PRAVASTATIN SODIUM 20 MG: 20 TABLET ORAL at 17:07

## 2019-01-07 RX ADMIN — PYRIDOSTIGMINE BROMIDE 120 MG: 60 TABLET ORAL at 11:44

## 2019-01-07 RX ADMIN — MIDODRINE HYDROCHLORIDE 5 MG: 5 TABLET ORAL at 05:06

## 2019-01-07 ASSESSMENT — PATIENT HEALTH QUESTIONNAIRE - PHQ9
SUM OF ALL RESPONSES TO PHQ9 QUESTIONS 1 AND 2: 0
1. LITTLE INTEREST OR PLEASURE IN DOING THINGS: NOT AT ALL
2. FEELING DOWN, DEPRESSED, IRRITABLE, OR HOPELESS: NOT AT ALL

## 2019-01-07 ASSESSMENT — ENCOUNTER SYMPTOMS
ABDOMINAL PAIN: 0
DIZZINESS: 1
FEVER: 0
BLURRED VISION: 0
NAUSEA: 0
NECK PAIN: 0
SHORTNESS OF BREATH: 0
HEADACHES: 0
COUGH: 1

## 2019-01-07 ASSESSMENT — PAIN SCALES - GENERAL: PAINLEVEL_OUTOF10: 0

## 2019-01-07 NOTE — CARE PLAN
Problem: Knowledge Deficit  Goal: Knowledge of disease process/condition, treatment plan, diagnostic tests, and medications will improve  Outcome: PROGRESSING AS EXPECTED  Pt educated on risks for pneumonia. Pt provided IS and instructed on rationale and proper use.    Problem: Respiratory:  Goal: Respiratory status will improve  Outcome: PROGRESSING SLOWER THAN EXPECTED  1L NC applied. Pt coughing up sputum and rhonchi ausculted in all lung fields. Pt provided IS and instructed on how to use.

## 2019-01-07 NOTE — PROGRESS NOTES
Hospital Medicine Daily Progress Note    Date of Service  1/7/2019    Chief Complaint  53 y.o. male admitted 12/28/2018 with syncope.    Hospital Course    PMH CHD, DM, HTN, HLD, and recent hospitalization for afib s/p cardioversion who had difficult to control orthostatic hypotension at that time. He had cardiac cath at that time, no cardiac cause of orthostatic hypotension. He was discharged on florinef, salt tabs, midodrine.  He presented with syncope while walking and back pain. He was found with L1 compression fracture. Dr Pepper with NSG recommended TLSO and management for osteoporosis. Had had CT head and carotid doppler that was unremarkable. Recent TTE showed normal EF with mild MR and TR. Telemetry did not show any abnormalities. MRI brain showed chronic changes. TSH was normal. He has orthostatic hypotension that's been difficult to correct despite multiple medications.   He has chronic leg swelling that has been worse since lasix stopped for hypotension. Doppler was negative for DVT bilaterally. He was noted to have right hand cellulitis and treated with clindamycin.        Interval Problem Update  Remains orthostatic. Per cardiology note during last admission, consider adding droxidopa 100mg TID, hospital does not carry.   Elevated resting BP limits increase in midodrine.  Discussed with pharmacy and will decreased mestinon and increase midodrine  Had hypoxia this morning with standing. CXR ordered. Has cough and congestion.    Consultants/Specialty  None    Code Status  Full    Disposition  F/u [facility] ordered  homeless    Review of Systems  Review of Systems   Constitutional: Negative for fever.   HENT: Positive for congestion.    Eyes: Negative for blurred vision.   Respiratory: Positive for cough. Negative for shortness of breath.    Cardiovascular: Positive for leg swelling. Negative for chest pain.   Gastrointestinal: Negative for abdominal pain and nausea.   Musculoskeletal:  Negative for neck pain.   Skin: Negative for rash.   Neurological: Positive for dizziness. Negative for headaches.        Physical Exam  Temp:  [36.1 °C (96.9 °F)-36.9 °C (98.4 °F)] 36.1 °C (96.9 °F)  Pulse:  [61-91] 61  Resp:  [17-19] 17  BP: ()/(56-97) 160/71    Physical Exam   Constitutional: He is oriented to person, place, and time. He appears well-developed and well-nourished.   HENT:   Head: Normocephalic.   Mouth/Throat: Oropharynx is clear and moist.   Eyes: Conjunctivae are normal. Right eye exhibits no discharge. Left eye exhibits no discharge.   Cardiovascular: Normal rate and regular rhythm.    No murmur heard.  Pulmonary/Chest: Effort normal. No respiratory distress. He has no wheezes.   Intermittent rales right lung field   Abdominal: Soft. There is no tenderness.   Musculoskeletal: He exhibits edema (LE).   Right dorsum hand with abrasion, no erythema/swelling/tenderness   Neurological: He is alert and oriented to person, place, and time.   Skin: Skin is warm and dry.   Nursing note and vitals reviewed.      Fluids    Intake/Output Summary (Last 24 hours) at 01/07/19 1257  Last data filed at 01/07/19 1000   Gross per 24 hour   Intake              480 ml   Output              300 ml   Net              180 ml       Laboratory  Recent Labs      01/06/19 0229 01/07/19   0106   WBC  8.4  9.1   RBC  5.12  5.00   HEMOGLOBIN  14.3  14.0   HEMATOCRIT  43.8  42.9   MCV  85.5  85.8   MCH  27.9  28.0   MCHC  32.6*  32.6*   RDW  43.7  44.2   PLATELETCT  202  174   MPV  10.2  11.7     Recent Labs      01/06/19 0229 01/07/19   0106   SODIUM  137  136   POTASSIUM  4.1  3.9   CHLORIDE  102  104   CO2  28  28   GLUCOSE  106*  111*   BUN  23*  27*   CREATININE  0.85  0.75   CALCIUM  9.0  8.9                   Imaging  DX-CHEST-PORTABLE (1 VIEW)   Final Result      Hypoinflation without other evidence for acute cardiopulmonary disease.      MR-BRAIN-W/O   Final Result      1.  MRI of the brain without  contrast within normal limits for age with mild white matter changes.   2.  Chronic appearing sinus disease   3.  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 This does not have an aggressive appearance.      US-EXTREMITY VENOUS LOWER BILAT   Final Result      CT-HAND WITH RIGHT   Final Result      1.  Edema in the dorsum of the forearm and hand without a discrete fluid collection to suggest abscess is identified.      US-CAROTID DOPPLER BILAT   Final Result      DX-LUMBAR SPINE-2 OR 3 VIEWS   Final Result         Stable compression deformity in the L1 vertebral body.      CT-CTA CHEST PULMONARY ARTERY W/ RECONS   Final Result         1. No CT evidence of pulmonary embolism.      2. Old right rib fractures.      3. Cholelithiasis. Enlarged spleen with granulomas.      4. Mild superior endplate compression deformity of L1 could be acute. Correlate with point tenderness. No retropulsion. No malalignment.      CT-CSPINE WITHOUT PLUS RECONS   Final Result         1. No acute fracture from C1 through T3 is visualized.         CT-HEAD W/O   Final Result         1. No acute intracranial abnormality. No evidence of acute intracranial hemorrhage or mass lesion.               DX-THORACIC SPINE-WITH SWIMMERS VIEW   Final Result      No acute thoracic spine fracture or subluxation identified.      DX-CHEST-PORTABLE (1 VIEW)   Final Result      No acute cardiac or pulmonary abnormality is noted.           Assessment/Plan  * Orthostatic hypotension- (present on admission)   Assessment & Plan    Possibly secondary to dysautonomia secondary to diabetes.  Ongoing despite midodrine, florinef, mestinon, reglan, vit B12 and solumedrol, salt tabs, compression stockings  Midodrine dose was decreased due to elevated resting BP  Per cardiology note during last admission, consider adding droxidopa 100mg TID, hospital does not carry   Discussed with pharmacy and will decreased mestinon and  increase midodrine  Check orthostatics daily  PTOT       Cellulitis of finger of right hand   Assessment & Plan    CT of the right hand did not show abscess.  Resolved with course of clindamycin     Lumbar compression fracture ([facility])   Assessment & Plan    Pain management as needed.    Neurosurgery recommended TLSO brace.   PT OT recommended home health.    F/u with [facility]     Vitamin B12 deficiency- (present on admission)   Assessment & Plan    Started intramuscular injections       Chronic diastolic heart failure ([facility])- (present on admission)   Assessment & Plan    Without current exacerbation.    Chronic lower extremity edema -chronic venous insufficiency     Acute respiratory failure with hypoxia ([facility])   Assessment & Plan    With cough and congestion  CXR ordered     Venous stasis dermatitis of both lower extremities- (present on admission)   Assessment & Plan    Holding lasix  Compression stockings     Bilateral leg edema- (present on admission)   Assessment & Plan    Appears to have chronic edematous changes.  Likely secondary to venous insufficiency.    Recent TTE showed intact EF  Lasix has been held for orthostatic hypotension  Ultrasound for DVT was study was done and it was negative for DVT  Compression stockings ordered     Syncope- (present on admission)   Assessment & Plan    Due to orthostasis.    CT of the head unremarkable. MRI brain showed chronic changes  Carotid Doppler did not reveal significant stenosis  Recent TTE showed normal EF, mild TR and MR  PTOT     Paroxysmal A-fib ([facility])- (present on admission)   Assessment & Plan    Continue xarelto and amiodarone  Sinus rhythm     Essential hypertension- (present on admission)   Assessment & Plan    Lasix held for orthostatic hypotension  Midodrine reduced for elevated resting BP     Mixed hyperlipidemia- (present on admission)   Assessment & Plan    Continue current therapy           VTE prophylaxis: xarelto

## 2019-01-07 NOTE — PROGRESS NOTES
Report received. Assumed care at 0700, assessment complete. Pt is A&O x 4. 0/10 pain at this time. Pt with productive cough. Sputum yellow and greenish in color per pt. Inspiratory rhonchi noted bilaterally in all lung fields, more prominently noted on left lung. Pt spO2 dropping while standing. 1L O2 applied and Pt spO2 steady at 94%. MD notified. Chest x-ray ordered. Patient instructed on the plan of care for the day. Bed in lowest position. Bed alarm refused. Charge RN notified. PT call appropriately. Call light within reach. Patient provided RN extension.

## 2019-01-07 NOTE — CARE PLAN
Problem: Knowledge Deficit  Goal: Knowledge of disease process/condition, treatment plan, diagnostic tests, and medications will improve  Outcome: PROGRESSING SLOWER THAN EXPECTED  Pt is not compliant with wearing SLO back while using the bathroom.

## 2019-01-07 NOTE — ASSESSMENT & PLAN NOTE
Resolved.   AOx4, Non toxic appearing, NAD. Skin  warm and dry, no acute rash. PERRLA/EOM, conjunctiva and sclera clear. MM moist, no nasal discharge.  Pharynx and TM's unremarkable. Neck supple nt, no meningeal signs. Heart RRR s1s2 nl, no rub/murmur. Lungs- BS equal, CTAB. Abdomen soft ntnd no r/g. Toe mild ttp, john taped, nv intact distally good cap refill to all digits, mild bruising to toe noted.

## 2019-01-08 LAB
ANION GAP SERPL CALC-SCNC: 7 MMOL/L (ref 0–11.9)
BASOPHILS # BLD AUTO: 0.5 % (ref 0–1.8)
BASOPHILS # BLD: 0.05 K/UL (ref 0–0.12)
BUN SERPL-MCNC: 30 MG/DL (ref 8–22)
CALCIUM SERPL-MCNC: 8.6 MG/DL (ref 8.5–10.5)
CHLORIDE SERPL-SCNC: 104 MMOL/L (ref 96–112)
CO2 SERPL-SCNC: 24 MMOL/L (ref 20–33)
CREAT SERPL-MCNC: 0.76 MG/DL (ref 0.5–1.4)
EKG IMPRESSION: NORMAL
EOSINOPHIL # BLD AUTO: 0.49 K/UL (ref 0–0.51)
EOSINOPHIL NFR BLD: 4.8 % (ref 0–6.9)
ERYTHROCYTE [DISTWIDTH] IN BLOOD BY AUTOMATED COUNT: 46.4 FL (ref 35.9–50)
GLUCOSE SERPL-MCNC: 106 MG/DL (ref 65–99)
HCT VFR BLD AUTO: 44 % (ref 42–52)
HGB BLD-MCNC: 14 G/DL (ref 14–18)
IMM GRANULOCYTES # BLD AUTO: 0.04 K/UL (ref 0–0.11)
IMM GRANULOCYTES NFR BLD AUTO: 0.4 % (ref 0–0.9)
LYMPHOCYTES # BLD AUTO: 3.98 K/UL (ref 1–4.8)
LYMPHOCYTES NFR BLD: 38.9 % (ref 22–41)
MCH RBC QN AUTO: 28 PG (ref 27–33)
MCHC RBC AUTO-ENTMCNC: 31.8 G/DL (ref 33.7–35.3)
MCV RBC AUTO: 88 FL (ref 81.4–97.8)
MONOCYTES # BLD AUTO: 0.82 K/UL (ref 0–0.85)
MONOCYTES NFR BLD AUTO: 8 % (ref 0–13.4)
NEUTROPHILS # BLD AUTO: 4.86 K/UL (ref 1.82–7.42)
NEUTROPHILS NFR BLD: 47.4 % (ref 44–72)
NRBC # BLD AUTO: 0 K/UL
NRBC BLD-RTO: 0 /100 WBC
PLATELET # BLD AUTO: 194 K/UL (ref 164–446)
PMV BLD AUTO: 10.1 FL (ref 9–12.9)
POTASSIUM SERPL-SCNC: 4 MMOL/L (ref 3.6–5.5)
PROCALCITONIN SERPL-MCNC: <0.05 NG/ML
RBC # BLD AUTO: 5 M/UL (ref 4.7–6.1)
SODIUM SERPL-SCNC: 135 MMOL/L (ref 135–145)
WBC # BLD AUTO: 10.2 K/UL (ref 4.8–10.8)

## 2019-01-08 PROCEDURE — 84145 PROCALCITONIN (PCT): CPT

## 2019-01-08 PROCEDURE — 700102 HCHG RX REV CODE 250 W/ 637 OVERRIDE(OP): Performed by: HOSPITALIST

## 2019-01-08 PROCEDURE — 700102 HCHG RX REV CODE 250 W/ 637 OVERRIDE(OP): Performed by: INTERNAL MEDICINE

## 2019-01-08 PROCEDURE — A9270 NON-COVERED ITEM OR SERVICE: HCPCS | Performed by: INTERNAL MEDICINE

## 2019-01-08 PROCEDURE — A9270 NON-COVERED ITEM OR SERVICE: HCPCS | Performed by: HOSPITALIST

## 2019-01-08 PROCEDURE — G0378 HOSPITAL OBSERVATION PER HR: HCPCS

## 2019-01-08 PROCEDURE — 99226 PR SUBSEQUENT OBSERVATION CARE,LEVEL III: CPT | Performed by: HOSPITALIST

## 2019-01-08 PROCEDURE — 85025 COMPLETE CBC W/AUTO DIFF WBC: CPT

## 2019-01-08 PROCEDURE — 80048 BASIC METABOLIC PNL TOTAL CA: CPT

## 2019-01-08 PROCEDURE — 36415 COLL VENOUS BLD VENIPUNCTURE: CPT

## 2019-01-08 RX ORDER — GUAIFENESIN 600 MG/1
600 TABLET, EXTENDED RELEASE ORAL EVERY 12 HOURS
Status: DISCONTINUED | OUTPATIENT
Start: 2019-01-08 | End: 2019-01-28

## 2019-01-08 RX ADMIN — METOCLOPRAMIDE HYDROCHLORIDE 10 MG: 10 TABLET ORAL at 17:05

## 2019-01-08 RX ADMIN — METOCLOPRAMIDE HYDROCHLORIDE 10 MG: 10 TABLET ORAL at 05:54

## 2019-01-08 RX ADMIN — GUAIFENESIN 600 MG: 600 TABLET, EXTENDED RELEASE ORAL at 12:00

## 2019-01-08 RX ADMIN — PYRIDOSTIGMINE BROMIDE 90 MG: 60 TABLET ORAL at 17:04

## 2019-01-08 RX ADMIN — METOCLOPRAMIDE HYDROCHLORIDE 10 MG: 10 TABLET ORAL at 12:00

## 2019-01-08 RX ADMIN — FLUDROCORTISONE ACETATE 0.3 MG: 0.1 TABLET ORAL at 05:54

## 2019-01-08 RX ADMIN — NYSTATIN: 100000 POWDER TOPICAL at 05:54

## 2019-01-08 RX ADMIN — AMIODARONE HYDROCHLORIDE 50 MG: 200 TABLET ORAL at 05:54

## 2019-01-08 RX ADMIN — NYSTATIN: 100000 POWDER TOPICAL at 17:07

## 2019-01-08 RX ADMIN — OXYCODONE HYDROCHLORIDE AND ACETAMINOPHEN 500 MG: 500 TABLET ORAL at 05:53

## 2019-01-08 RX ADMIN — SODIUM CHLORIDE TAB 1 GM 1 G: 1 TAB at 05:53

## 2019-01-08 RX ADMIN — FLUOXETINE HYDROCHLORIDE 20 MG: 20 CAPSULE ORAL at 05:54

## 2019-01-08 RX ADMIN — ACETAMINOPHEN 650 MG: 325 TABLET, FILM COATED ORAL at 07:55

## 2019-01-08 RX ADMIN — MIDODRINE HYDROCHLORIDE 10 MG: 5 TABLET ORAL at 13:41

## 2019-01-08 RX ADMIN — PYRIDOSTIGMINE BROMIDE 90 MG: 60 TABLET ORAL at 05:54

## 2019-01-08 RX ADMIN — GUAIFENESIN 600 MG: 600 TABLET, EXTENDED RELEASE ORAL at 17:06

## 2019-01-08 RX ADMIN — SODIUM CHLORIDE TAB 1 GM 1 G: 1 TAB at 12:00

## 2019-01-08 RX ADMIN — RIVAROXABAN 20 MG: 20 TABLET, FILM COATED ORAL at 17:06

## 2019-01-08 RX ADMIN — MIDODRINE HYDROCHLORIDE 10 MG: 5 TABLET ORAL at 17:06

## 2019-01-08 RX ADMIN — MULTIPLE VITAMINS W/ MINERALS TAB 1 TABLET: TAB at 05:54

## 2019-01-08 RX ADMIN — Medication 400 MG: at 05:53

## 2019-01-08 RX ADMIN — ACETAMINOPHEN 650 MG: 325 TABLET, FILM COATED ORAL at 21:05

## 2019-01-08 RX ADMIN — SODIUM CHLORIDE TAB 1 GM 1 G: 1 TAB at 17:05

## 2019-01-08 RX ADMIN — PYRIDOSTIGMINE BROMIDE 90 MG: 60 TABLET ORAL at 13:41

## 2019-01-08 RX ADMIN — PRAVASTATIN SODIUM 20 MG: 20 TABLET ORAL at 17:06

## 2019-01-08 ASSESSMENT — ENCOUNTER SYMPTOMS
BLURRED VISION: 0
NECK PAIN: 0
FEVER: 0
DIZZINESS: 1
NAUSEA: 0
HEADACHES: 0
COUGH: 1
SHORTNESS OF BREATH: 1
ABDOMINAL PAIN: 0

## 2019-01-08 ASSESSMENT — PAIN SCALES - GENERAL
PAINLEVEL_OUTOF10: 7
PAINLEVEL_OUTOF10: 0
PAINLEVEL_OUTOF10: 0
PAINLEVEL_OUTOF10: 3
PAINLEVEL_OUTOF10: 6

## 2019-01-08 NOTE — CARE PLAN
Problem: Pain Management  Goal: Pain level will decrease to patient's comfort goal  Outcome: PROGRESSING AS EXPECTED  Pt does not have any pain at this time.     Problem: Communication  Goal: The ability to communicate needs accurately and effectively will improve  Outcome: PROGRESSING AS EXPECTED  Pt is able to communicate needs and calls appropriately.

## 2019-01-08 NOTE — FACE TO FACE
Face to Face Note  -  Durable Medical Equipment    Lesley Fregoso M.D. - NPI: [id number]  I certify that this patient is under my care and that they had a durable medical equipment(DME)face to face encounter by myself that meets the physician DME face-to-face encounter requirements with this patient on:    Date of encounter:   Patient:                    MRN:                       YOB: 2021  Sebastián Castro  [id number]  1965     The encounter with the patient was in whole, or in part, for the following medical condition, which is the primary reason for durable medical equipment:  Other - significant orthostatic hypotension    I certify that, based on my findings, the following durable medical equipment is medically necessary:  Wheel Chair.    HOME O2 Saturation Measurements:(Values must be present for Home Oxygen orders)         ,     ,         My Clinical findings support the need for the above equipment due to:  Bedbound/non-weight bearing    Supporting Symptoms: patient readmitted due to fall and resultant spinal fracture due to debilitating orthostatic hypotension

## 2019-01-08 NOTE — RESPIRATORY CARE
COPD EDUCATION by COPD CLINICAL EDUCATOR  1/8/2019 at 7:09 AM by Catie Ybarra     Patient reviewed by COPD education team. Patient does not qualify for COPD program.

## 2019-01-08 NOTE — PROGRESS NOTES
Hospital Medicine Daily Progress Note    Date of Service  1/8/2019    Chief Complaint  53 y.o. male admitted 12/28/2018 with syncope.    Hospital Course    PMH CHD, DM, HTN, HLD, and recent hospitalization for afib s/p cardioversion who had difficult to control orthostatic hypotension at that time. He had cardiac cath at that time, no cardiac cause of orthostatic hypotension. He was discharged on florinef, salt tabs, midodrine.  He presented with syncope while walking and back pain. He was found with L1 compression fracture. Dr Pepper with NSG recommended TLSO and management for osteoporosis. Had had CT head and carotid doppler that was unremarkable. Recent TTE showed normal EF with mild MR and TR. Telemetry did not show any abnormalities. MRI brain showed chronic changes. TSH was normal. He has orthostatic hypotension that's been difficult to correct despite multiple medications.   He has chronic leg swelling that has been worse since lasix stopped for hypotension. Doppler was negative for DVT bilaterally. He was noted to have right hand cellulitis and treated with clindamycin.        Interval Problem Update   remains orthostatic but dizziness has improved  Patient is very well known to me from prior admission  I discussed with patient how we have tried many medications and dosage changes over the course of his month long admission recently and he may need to follow up with as an outpatient. And in the mean while will need to limit his ambulation status at home and have family/friends assist him when he needs to move. Wheel chair ordered  Having a cough with minimal production. CXR reviewed by me with only atelectesis. Encourage IS and started on mucinex. Will check procalcitonin    Consultants/Specialty  None    Code Status  Full    Disposition  F/u [facility] ordered      Review of Systems  Review of Systems   Constitutional: Negative for fever.   HENT: Positive for congestion.    Eyes: Negative for  blurred vision.   Respiratory: Positive for cough and shortness of breath.    Cardiovascular: Positive for leg swelling. Negative for chest pain.   Gastrointestinal: Negative for abdominal pain and nausea.   Musculoskeletal: Negative for neck pain.   Skin: Negative for rash.   Neurological: Positive for dizziness (improved). Negative for headaches.        Physical Exam  Temp:  [36.2 °C (97.2 °F)-36.9 °C (98.5 °F)] 36.3 °C (97.4 °F)  Pulse:  [63-85] 85  Resp:  [17-18] 17  BP: ()/(63-83) 99/66    Physical Exam   Constitutional: He is oriented to person, place, and time. He appears well-developed and well-nourished.   HENT:   Head: Normocephalic.   Mouth/Throat: Oropharynx is clear and moist.   Eyes: Conjunctivae are normal. Right eye exhibits no discharge. Left eye exhibits no discharge.   Cardiovascular: Normal rate and regular rhythm.    No murmur heard.  Pulmonary/Chest: Effort normal. No respiratory distress. He has wheezes (in right middle lung field).   Bibasilar course breath sounds with ronchi   Abdominal: Soft. There is no tenderness.   Musculoskeletal: He exhibits edema (LE).   Right dorsum hand with abrasion, no erythema/swelling/tenderness   Neurological: He is alert and oriented to person, place, and time.   Skin: Skin is warm and dry.   Nursing note and vitals reviewed.      Fluids    Intake/Output Summary (Last 24 hours) at 01/08/19 1851  Last data filed at 01/08/19 1800   Gross per 24 hour   Intake             2270 ml   Output             2600 ml   Net             -330 ml       Laboratory  Recent Labs      01/06/19 0229 01/07/19 0106 01/08/19   0101   WBC  8.4  9.1  10.2   RBC  5.12  5.00  5.00   HEMOGLOBIN  14.3  14.0  14.0   HEMATOCRIT  43.8  42.9  44.0   MCV  85.5  85.8  88.0   MCH  27.9  28.0  28.0   MCHC  32.6*  32.6*  31.8*   RDW  43.7  44.2  46.4   PLATELETCT  202  174  194   MPV  10.2  11.7  10.1     Recent Labs      01/06/19 0229 01/07/19 0106  01/08/19   0101   SODIUM  137   136  135   POTASSIUM  4.1  3.9  4.0   CHLORIDE  102  104  104   CO2  28  28  24   GLUCOSE  106*  111*  106*   BUN  23*  27*  30*   CREATININE  0.85  0.75  0.76   CALCIUM  9.0  8.9  8.6                   Imaging  DX-CHEST-PORTABLE (1 VIEW)   Final Result      Hypoinflation without other evidence for acute cardiopulmonary disease.      MR-BRAIN-W/O   Final Result      1.  MRI of the brain without contrast within normal limits for age with mild white matter changes.   2.  Chronic appearing sinus disease   3.  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 This does not have an aggressive appearance.      US-EXTREMITY VENOUS LOWER BILAT   Final Result      CT-HAND WITH RIGHT   Final Result      1.  Edema in the dorsum of the forearm and hand without a discrete fluid collection to suggest abscess is identified.      US-CAROTID DOPPLER BILAT   Final Result      DX-LUMBAR SPINE-2 OR 3 VIEWS   Final Result         Stable compression deformity in the L1 vertebral body.      CT-CTA CHEST PULMONARY ARTERY W/ RECONS   Final Result         1. No CT evidence of pulmonary embolism.      2. Old right rib fractures.      3. Cholelithiasis. Enlarged spleen with granulomas.      4. Mild superior endplate compression deformity of L1 could be acute. Correlate with point tenderness. No retropulsion. No malalignment.      CT-CSPINE WITHOUT PLUS RECONS   Final Result         1. No acute fracture from C1 through T3 is visualized.         CT-HEAD W/O   Final Result         1. No acute intracranial abnormality. No evidence of acute intracranial hemorrhage or mass lesion.               DX-THORACIC SPINE-WITH SWIMMERS VIEW   Final Result      No acute thoracic spine fracture or subluxation identified.      DX-CHEST-PORTABLE (1 VIEW)   Final Result      No acute cardiac or pulmonary abnormality is noted.           Assessment/Plan  * Orthostatic hypotension- (present on admission)   Assessment & Plan     Possibly secondary to dysautonomia secondary to diabetes.  Ongoing despite midodrine, florinef, mestinon, reglan, vit B12 and solumedrol, salt tabs, compression stockings  Midodrine dose was decreased due to elevated resting BP  Per cardiology note during last admission, consider adding droxidopa 100mg TID, hospital does not carry   Discussed with pharmacy and will decreased mestinon and increase midodrine  Check orthostatics daily  PTOT  Pt admitted recently for similar issues for nearly a month. I discussed with patient about arranging a wheelchair until he is able to follow up with an outpatient specialist and recommend he get a prescription of droxidopa 100mg TID from his PCP. Patient is aggreable to this plan       Cellulitis of finger of right hand   Assessment & Plan    CT of the right hand did not show abscess.  Resolved with course of clindamycin     Lumbar compression fracture (HCC)   Assessment & Plan    Pain management as needed.    Neurosurgery recommended TLSO brace.   PT OT recommended home health.    F/u with [facility]     Vitamin B12 deficiency- (present on admission)   Assessment & Plan    Started intramuscular injections       Chronic diastolic heart failure (HCC)- (present on admission)   Assessment & Plan    Without current exacerbation.    Chronic lower extremity edema -chronic venous insufficiency     Acute respiratory failure with hypoxia (HCC)   Assessment & Plan    With cough and congestion  CXR reviewed by me showing atelectesis  Start IS, mucinex and check procalcitonin     Venous stasis dermatitis of both lower extremities- (present on admission)   Assessment & Plan    Holding lasix  Compression stockings     Bilateral leg edema- (present on admission)   Assessment & Plan    Appears to have chronic edematous changes.  Likely secondary to venous insufficiency.    Recent TTE showed intact EF  Lasix has been held for orthostatic hypotension  Ultrasound for DVT was study was done and it  was negative for DVT  Compression stockings ordered     Syncope- (present on admission)   Assessment & Plan    Due to orthostasis.    CT of the head unremarkable. MRI brain showed chronic changes  Carotid Doppler did not reveal significant stenosis  Recent TTE showed normal EF, mild TR and MR  PTOT     Paroxysmal A-fib (HCC)- (present on admission)   Assessment & Plan    Continue xarelto and amiodarone  Sinus rhythm     Essential hypertension- (present on admission)   Assessment & Plan    Lasix held for orthostatic hypotension  Midodrine reduced for elevated resting BP     Mixed hyperlipidemia- (present on admission)   Assessment & Plan    Continue current therapy           VTE prophylaxis: xarelto

## 2019-01-08 NOTE — DISCHARGE PLANNING
Received Choice form at 1189  Agency/Facility Name: [facility]  Referral sent per Choice form @ 4288  For DME wheelchair

## 2019-01-08 NOTE — PROGRESS NOTES
Report received. Assumed care at 0700, assessment complete. Pt is A&O x 4. 6/10 pain at this time in lower back at this time. Rhonchi auscultated bilaterally in lower lobes. Pt continuing to cough up sputum. Patient instructed on the plan of care for the day. Bed in lowest position. Call light within reach. Patient provided RN and CNA extension.

## 2019-01-08 NOTE — CARE PLAN
Problem: Respiratory:  Goal: Respiratory status will improve  Outcome: PROGRESSING AS EXPECTED  Pt instructed on the use of IS to improved respiratory status. Pt performs correctly.    Problem: Mobility  Goal: Risk for activity intolerance will decrease  Outcome: PROGRESSING AS EXPECTED  Pt instructed on importance of increased mobility. Pt encourage to walk with walker and staff member

## 2019-01-09 ENCOUNTER — APPOINTMENT (OUTPATIENT)
Dept: RADIOLOGY | Facility: MEDICAL CENTER | Age: 54
End: 2019-01-09
Attending: HOSPITALIST
Payer: MEDICAID

## 2019-01-09 PROBLEM — L03.011 CELLULITIS OF FINGER OF RIGHT HAND: Status: RESOLVED | Noted: 2018-12-30 | Resolved: 2019-01-09
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
## 2019-01-09 PROCEDURE — 99226 PR SUBSEQUENT OBSERVATION CARE,LEVEL III: CPT | Performed by: HOSPITALIST

## 2019-01-09 PROCEDURE — 700102 HCHG RX REV CODE 250 W/ 637 OVERRIDE(OP): Performed by: INTERNAL MEDICINE

## 2019-01-09 PROCEDURE — A9270 NON-COVERED ITEM OR SERVICE: HCPCS | Performed by: INTERNAL MEDICINE

## 2019-01-09 PROCEDURE — G0378 HOSPITAL OBSERVATION PER HR: HCPCS

## 2019-01-09 PROCEDURE — A9270 NON-COVERED ITEM OR SERVICE: HCPCS | Performed by: HOSPITALIST

## 2019-01-09 PROCEDURE — 700111 HCHG RX REV CODE 636 W/ 250 OVERRIDE (IP): Performed by: INTERNAL MEDICINE

## 2019-01-09 PROCEDURE — 73501 X-RAY EXAM HIP UNI 1 VIEW: CPT | Mod: RT

## 2019-01-09 PROCEDURE — 700102 HCHG RX REV CODE 250 W/ 637 OVERRIDE(OP): Performed by: HOSPITALIST

## 2019-01-09 PROCEDURE — 99356 PR PROLONGED SVC I/P OR OBS SETTING 1ST HOUR: CPT | Performed by: HOSPITALIST

## 2019-01-09 RX ADMIN — CYANOCOBALAMIN 1000 MCG: 1000 INJECTION INTRAMUSCULAR; SUBCUTANEOUS at 16:07

## 2019-01-09 RX ADMIN — OXYCODONE HYDROCHLORIDE AND ACETAMINOPHEN 500 MG: 500 TABLET ORAL at 05:57

## 2019-01-09 RX ADMIN — MIDODRINE HYDROCHLORIDE 10 MG: 5 TABLET ORAL at 05:58

## 2019-01-09 RX ADMIN — PYRIDOSTIGMINE BROMIDE 90 MG: 60 TABLET ORAL at 17:26

## 2019-01-09 RX ADMIN — SODIUM CHLORIDE TAB 1 GM 1 G: 1 TAB at 12:35

## 2019-01-09 RX ADMIN — MIDODRINE HYDROCHLORIDE 10 MG: 5 TABLET ORAL at 12:35

## 2019-01-09 RX ADMIN — AMIODARONE HYDROCHLORIDE 50 MG: 200 TABLET ORAL at 05:57

## 2019-01-09 RX ADMIN — NYSTATIN: 100000 POWDER TOPICAL at 05:57

## 2019-01-09 RX ADMIN — PYRIDOSTIGMINE BROMIDE 90 MG: 60 TABLET ORAL at 12:34

## 2019-01-09 RX ADMIN — PYRIDOSTIGMINE BROMIDE 90 MG: 60 TABLET ORAL at 06:02

## 2019-01-09 RX ADMIN — FLUOXETINE HYDROCHLORIDE 20 MG: 20 CAPSULE ORAL at 05:57

## 2019-01-09 RX ADMIN — GUAIFENESIN 600 MG: 600 TABLET, EXTENDED RELEASE ORAL at 17:26

## 2019-01-09 RX ADMIN — ACETAMINOPHEN 650 MG: 325 TABLET, FILM COATED ORAL at 20:50

## 2019-01-09 RX ADMIN — PRAVASTATIN SODIUM 20 MG: 20 TABLET ORAL at 17:26

## 2019-01-09 RX ADMIN — MIDODRINE HYDROCHLORIDE 10 MG: 5 TABLET ORAL at 17:26

## 2019-01-09 RX ADMIN — NYSTATIN: 100000 POWDER TOPICAL at 17:26

## 2019-01-09 RX ADMIN — MULTIPLE VITAMINS W/ MINERALS TAB 1 TABLET: TAB at 05:58

## 2019-01-09 RX ADMIN — SODIUM CHLORIDE TAB 1 GM 1 G: 1 TAB at 17:26

## 2019-01-09 RX ADMIN — METOCLOPRAMIDE HYDROCHLORIDE 10 MG: 10 TABLET ORAL at 17:26

## 2019-01-09 RX ADMIN — SODIUM CHLORIDE TAB 1 GM 1 G: 1 TAB at 08:02

## 2019-01-09 RX ADMIN — GUAIFENESIN 600 MG: 600 TABLET, EXTENDED RELEASE ORAL at 05:57

## 2019-01-09 RX ADMIN — Medication 400 MG: at 05:58

## 2019-01-09 RX ADMIN — METOCLOPRAMIDE HYDROCHLORIDE 10 MG: 10 TABLET ORAL at 12:34

## 2019-01-09 RX ADMIN — METOCLOPRAMIDE HYDROCHLORIDE 10 MG: 10 TABLET ORAL at 08:02

## 2019-01-09 RX ADMIN — FLUDROCORTISONE ACETATE 0.3 MG: 0.1 TABLET ORAL at 05:57

## 2019-01-09 ASSESSMENT — PAIN SCALES - GENERAL
PAINLEVEL_OUTOF10: 0
PAINLEVEL_OUTOF10: 7

## 2019-01-09 ASSESSMENT — COPD QUESTIONNAIRES
DURING THE PAST 4 WEEKS HOW MUCH DID YOU FEEL SHORT OF BREATH: NONE/LITTLE OF THE TIME
DO YOU EVER COUGH UP ANY MUCUS OR PHLEGM?: NO/ONLY WITH OCCASIONAL COLDS OR INFECTIONS
COPD SCREENING SCORE: 1
HAVE YOU SMOKED AT LEAST 100 CIGARETTES IN YOUR ENTIRE LIFE: NO/DON'T KNOW

## 2019-01-09 ASSESSMENT — ENCOUNTER SYMPTOMS
NAUSEA: 0
DIZZINESS: 1
ABDOMINAL PAIN: 0
BLURRED VISION: 0
HEADACHES: 0
FEVER: 0
COUGH: 1
SHORTNESS OF BREATH: 1
NECK PAIN: 0

## 2019-01-09 ASSESSMENT — LIFESTYLE VARIABLES: EVER_SMOKED: NEVER

## 2019-01-09 NOTE — PROGRESS NOTES
"Hospital Medicine Daily Progress Note    Date of Service  1/9/2019    Chief Complaint  53 y.o. male admitted 12/28/2018 with syncope.    Hospital Course    PMH CHD, DM, HTN, HLD, and recent hospitalization for afib s/p cardioversion who had difficult to control orthostatic hypotension at that time. He had cardiac cath at that time, no cardiac cause of orthostatic hypotension. He was discharged on florinef, salt tabs, midodrine.  He presented with syncope while walking and back pain. He was found with L1 compression fracture. Dr Pepper with NSG recommended TLSO and management for osteoporosis. Had had CT head and carotid doppler that was unremarkable. Recent TTE showed normal EF with mild MR and TR. Telemetry did not show any abnormalities. MRI brain showed chronic changes. TSH was normal. He has orthostatic hypotension that's been difficult to correct despite multiple medications.   He has chronic leg swelling that has been worse since lasix stopped for hypotension. Doppler was negative for DVT bilaterally. He was noted to have right hand cellulitis and treated with clindamycin.        Interval Problem Update  Felt very dizzy this morning when he tried to sit to the edge of bed. Worried if he goes home he will fall  Wheel chair delivered  Will have PT come work with patient to teach him how to transfer to wheelchair the best way  Breathing has improved but still congested using IS. Sounds wheezy, RT protocol ordered     [facility] called this afternoon as patient was found down on the ground. Patient was apparently sitting at bedside when he got too dizzy and fell forward and lost consciousness for several seconds.  After which patient had a seizure like episode. Patient well known to me from prior admissions and has been diagnosed with non epileptic seizures. Upon talking to patient during seizure like episode he stopped \"seizing\" and immediately began talking. Upon awakening he is complaining of a headache and right " hip pain. He is able to move all 4 extremities, PERRLA, and A&Ox3. Will get x-ray of right hip as he is having difficulty moving his limb and it is tender. Will hold of on head CT at this time. If he develops worsening headache, nausea, or any neurological symptoms then will evaluate with head CT.     Consultants/Specialty  None    Code Status  Full    Disposition  F/u [facility] ordered      Review of Systems  Review of Systems   Constitutional: Negative for fever.   HENT: Positive for congestion.    Eyes: Negative for blurred vision.   Respiratory: Positive for cough and shortness of breath (improved).    Cardiovascular: Positive for leg swelling. Negative for chest pain.   Gastrointestinal: Negative for abdominal pain and nausea.   Musculoskeletal: Negative for neck pain.   Skin: Negative for rash.   Neurological: Positive for dizziness (worse from yesterday). Negative for headaches.        Physical Exam  Temp:  [35.9 °C (96.6 °F)-36.9 °C (98.4 °F)] 35.9 °C (96.6 °F)  Pulse:  [60-85] 66  Resp:  [17-19] 18  BP: ()/(66-91) 137/72    Physical Exam   Constitutional: He is oriented to person, place, and time. He appears well-developed and well-nourished.   HENT:   Head: Normocephalic.   Mouth/Throat: Oropharynx is clear and moist.   Eyes: Conjunctivae are normal. Right eye exhibits no discharge. Left eye exhibits no discharge.   Cardiovascular: Normal rate and regular rhythm.    No murmur heard.  Pulmonary/Chest: Effort normal. No respiratory distress. He has wheezes (more diffuse in b/l lung fields today).    course breath sounds with ronchi scattered throughout lung fields   Abdominal: Soft. There is no tenderness.   Musculoskeletal: He exhibits edema (LE).   Right dorsum hand with abrasion, no erythema/swelling/tenderness   Neurological: He is alert and oriented to person, place, and time.   Skin: Skin is warm and dry.   Nursing note and vitals reviewed.      Fluids    Intake/Output Summary  (Last 24 hours) at 01/09/19 1428  Last data filed at 01/09/19 1219   Gross per 24 hour   Intake             1120 ml   Output             1700 ml   Net             -580 ml       Laboratory  Recent Labs      01/07/19 0106  01/08/19   0101   WBC  9.1  10.2   RBC  5.00  5.00   HEMOGLOBIN  14.0  14.0   HEMATOCRIT  42.9  44.0   MCV  85.8  88.0   MCH  28.0  28.0   MCHC  32.6*  31.8*   RDW  44.2  46.4   PLATELETCT  174  194   MPV  11.7  10.1     Recent Labs      01/07/19   0106 01/08/19   0101   SODIUM  136  135   POTASSIUM  3.9  4.0   CHLORIDE  104  104   CO2  28  24   GLUCOSE  111*  106*   BUN  27*  30*   CREATININE  0.75  0.76   CALCIUM  8.9  8.6                   Imaging  DX-CHEST-PORTABLE (1 VIEW)   Final Result      Hypoinflation without other evidence for acute cardiopulmonary disease.      MR-BRAIN-W/O   Final Result      1.  MRI of the brain without contrast within normal limits for age with mild white matter changes.   2.  Chronic appearing sinus disease   3.  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 This does not have an aggressive appearance.      US-EXTREMITY VENOUS LOWER BILAT   Final Result      CT-HAND WITH RIGHT   Final Result      1.  Edema in the dorsum of the forearm and hand without a discrete fluid collection to suggest abscess is identified.      US-CAROTID DOPPLER BILAT   Final Result      DX-LUMBAR SPINE-2 OR 3 VIEWS   Final Result         Stable compression deformity in the L1 vertebral body.      CT-CTA CHEST PULMONARY ARTERY W/ RECONS   Final Result         1. No CT evidence of pulmonary embolism.      2. Old right rib fractures.      3. Cholelithiasis. Enlarged spleen with granulomas.      4. Mild superior endplate compression deformity of L1 could be acute. Correlate with point tenderness. No retropulsion. No malalignment.      CT-CSPINE WITHOUT PLUS RECONS   Final Result         1. No acute fracture from C1 through T3 is visualized.          CT-HEAD W/O   Final Result         1. No acute intracranial abnormality. No evidence of acute intracranial hemorrhage or mass lesion.               DX-THORACIC SPINE-WITH SWIMMERS VIEW   Final Result      No acute thoracic spine fracture or subluxation identified.      DX-CHEST-PORTABLE (1 VIEW)   Final Result      No acute cardiac or pulmonary abnormality is noted.      DX-HIP-UNILATERAL-WITH PELVIS-1 VIEW RIGHT    (Results Pending)        Assessment/Plan  * Orthostatic hypotension- (present on admission)   Assessment & Plan    Possibly secondary to dysautonomia secondary to diabetes.  Ongoing despite midodrine, florinef, mestinon, reglan, vit B12 and solumedrol, salt tabs, compression stockings  Midodrine dose was decreased due to elevated resting BP  Per cardiology note during last admission, consider adding droxidopa 100mg TID, hospital does not carry   Discussed with pharmacy and will decreased mestinon and increase midodrine  Check orthostatics daily  PTOT  Pt admitted recently for similar issues for nearly a month. I discussed with patient about arranging a wheelchair until he is able to follow up with an outpatient specialist and recommend he get a prescription of droxidopa 100mg TID from his PCP. Patient is aggreable to this plan       Fall from ground level   Assessment & Plan    Fell while sitting at bedside   Injured head but currently without any neurological complaints  Will monitor closely and if develops worsening headache, nausea, vomiting, or neuro symptoms will get head CT  Neuro checks Q4H x3  I discussed plan of care with bedside RN     Lumbar compression fracture (HCC)   Assessment & Plan    Pain management as needed.    Neurosurgery recommended TLSO brace.   PT OT recommended home health.    F/u with [facility]     Psychogenic nonepileptic seizure- (present on admission)   Assessment & Plan    Pt had seizure like episode after he fell  Pt well known to me from prior admission. Has been  "evaluated by neuro and normal EEG even during \"episodes\"  Patient stops seizing while speaking with him  AVOID benzodiazepines     Vitamin B12 deficiency- (present on admission)   Assessment & Plan    Started intramuscular injections       Syncope- (present on admission)   Assessment & Plan    Due to orthostasis.    CT of the head unremarkable. MRI brain showed chronic changes  Carotid Doppler did not reveal significant stenosis  Recent TTE showed normal EF, mild TR and MR    1/9: BRIGID MARTELL called this afternoon as patient was found down on the ground. Patient was apparently sitting at bedside when he got too dizzy and fell forward and lost consciousness for several seconds.  After which patient had a seizure like episode. Patient well known to me from prior admissions and has been diagnosed with non epileptic seizures. Upon talking to patient during seizure like episode he stopped \"seizing\" and immediately began talking. Upon awakening he is complaining of a headache and right hip pain. He is able to move all 4 extremities, PERRLA, and A&Ox3. Will get x-ray of right hip as he is having difficulty moving his limb and it is tender. Will hold of on head CT at this time. If he develops worsening headache, nausea, or any neurological symptoms then will evaluate with head CT.      Chronic diastolic heart failure (HCC)- (present on admission)   Assessment & Plan    Without current exacerbation.    Chronic lower extremity edema -chronic venous insufficiency     Acute respiratory failure with hypoxia (HCC)   Assessment & Plan    With cough and congestion  CXR reviewed by me showing atelectesis  Start IS, mucinex and check procalcitonin     Venous stasis dermatitis of both lower extremities- (present on admission)   Assessment & Plan    Holding lasix  Compression stockings     Bilateral leg edema- (present on admission)   Assessment & Plan    Appears to have chronic edematous changes.  Likely secondary to venous " insufficiency.    Recent TTE showed intact EF  Lasix has been held for orthostatic hypotension  Ultrasound for DVT was study was done and it was negative for DVT  Compression stockings ordered     Paroxysmal A-fib (HCC)- (present on admission)   Assessment & Plan    Continue xarelto and amiodarone  Sinus rhythm  Patient's [id number] is 3 indicating 4.6% of stroke/tia/systemic embolisation per year. HASBLED score is 2 indicating 4.1% bleeding risk. Due to patient's significant orthostatic hypotension and recurrent falls the risks of bleeding outweigh the benefit of anticoagulation at this point of time.  I discussed this in detail with patient and he is agreeable.  Educated patient that if at some point in time his orthostatic hypotension and falls resolve/improve then at that time might be a good time to resume anticoagulation.  I also discussed with him that if he did want to resume anticoagulation that I would recommend warfarin as there is a reversal agent for this.       Essential hypertension- (present on admission)   Assessment & Plan    Lasix held for orthostatic hypotension  Midodrine reduced for elevated resting BP     Mixed hyperlipidemia- (present on admission)   Assessment & Plan    Continue current therapy           VTE prophylaxis: xarelto    Approximately 15 minutes of face-to-face time spent with patient discussing with him the risks and benefits of anticoagulation as noted in my assessment and plan.

## 2019-01-09 NOTE — CODE DOCUMENTATION
Arrived to room, pt laying on the floor w/ pillow underneath his head, floor RNs w/ patient. [place] lift used to assist w/ moving pt back to bed.  Pt able to move all extremities, answering questions appropriately. States R hip pain.

## 2019-01-09 NOTE — PROGRESS NOTES
"1355- Patient roommate called out stating roommate fell on the ground. Nursing staff reported to room, patient found face down on the floor. Patient assessed for responsiveness. Patient initially unresponsive and code blue called and 1 minute later patient open eyes and communicated with staff.Code blue cancelled and Rapid Response called. Dr. Fregoso at bedside. Patient logged roll on back and stated \"I was sitting on the edge of the bed then passed out.\" Patient then appeared to enter a seizure state for less than 1 minute. No new orders per provider. Rapid team arrived. Vitals checked. Patient assisted back to bed via Travis lift. Patient stated he had head and right hip pain. Provider ordered right hip xray. Vital signs stable, alert and oriented x 4, able to move all extremities and feel on all limbs.     1420-Patient stated he does not wish for any family to be notified of event. Dr. Fregoso spoke to patient about monitoring neurological checks and signs/symptoms of blood clots. Patient verbalizes understanding. Patient educated to not sit at edge of bed without staff. Patient stated, \"Oh no, I won't.\" Will continue to monitor.   "

## 2019-01-09 NOTE — DISCHARGE PLANNING
Anticipated Discharge Disposition: Home with W/C    Action: LSW confirmed with bedside RN pt received W/C.    Barriers to Discharge: None    Plan: No additional discharge needs at this time.

## 2019-01-09 NOTE — DISCHARGE PLANNING
Agency/Facility Name: [facility]  Outcome: Wheelchair delivered to patient's bedside on 1/8.

## 2019-01-09 NOTE — PROGRESS NOTES
Patient resting in bed comfortably. Urinal emptied. Coffee provided to patient per request. Call light within reach.

## 2019-01-09 NOTE — PROGRESS NOTES
Assumed care of pt, received report from day shift RN, pt assessed.  Pt complaining of 7/10 back pain, pt medicated with PRN tylenol order per MAR.  Pt is A&O x4.  Pt moderate fall risk, wearing treaded socks, bed locked and in lowest position, bed alarm is on.  Pt instructed to call for assistance prior to getting OOB, pt verbalized understanding.  Call light, phone, and personal belongings within reach.

## 2019-01-09 NOTE — THERAPY
Upon coming up to floor to perform PT treatment, Pt apparantly had fallen and was found on ground with subsequent seizure activity.  Hold PT at this time, will re attempt as able/appropriate.     Glenna Philippe PT/DPT  Pager# [phone]

## 2019-01-09 NOTE — CARE PLAN
Problem: Communication  Goal: The ability to communicate needs accurately and effectively will improve  Outcome: PROGRESSING AS EXPECTED  Patient verbalizes needs appropriately.     Problem: Safety  Goal: Will remain free from injury  Outcome: PROGRESSING AS EXPECTED  Fall precautions in place.

## 2019-01-09 NOTE — CODE DOCUMENTATION
MD Fregoso at bedside to assess pt. Pt states pain in the R hip, denies headache, aaox4. Per MD no need for head imaging at this time, continue to monitor neuro status.  MD to order xray of R hip.

## 2019-01-10 PROCEDURE — A9270 NON-COVERED ITEM OR SERVICE: HCPCS | Performed by: HOSPITALIST

## 2019-01-10 PROCEDURE — A9270 NON-COVERED ITEM OR SERVICE: HCPCS | Performed by: INTERNAL MEDICINE

## 2019-01-10 PROCEDURE — 700102 HCHG RX REV CODE 250 W/ 637 OVERRIDE(OP): Performed by: INTERNAL MEDICINE

## 2019-01-10 PROCEDURE — G0378 HOSPITAL OBSERVATION PER HR: HCPCS

## 2019-01-10 PROCEDURE — 700102 HCHG RX REV CODE 250 W/ 637 OVERRIDE(OP): Performed by: HOSPITALIST

## 2019-01-10 PROCEDURE — 99226 PR SUBSEQUENT OBSERVATION CARE,LEVEL III: CPT | Performed by: HOSPITALIST

## 2019-01-10 RX ORDER — DROXIDOPA 100 MG/1
100 CAPSULE ORAL 3 TIMES DAILY
Qty: 90 CAP | Refills: 2 | Status: SHIPPED | OUTPATIENT
Start: 2019-01-10 | End: 2019-02-27

## 2019-01-10 RX ORDER — ASPIRIN 81 MG/1
81 TABLET, CHEWABLE ORAL DAILY
Status: DISCONTINUED | OUTPATIENT
Start: 2019-01-10 | End: 2019-02-27 | Stop reason: HOSPADM

## 2019-01-10 RX ADMIN — METOCLOPRAMIDE HYDROCHLORIDE 10 MG: 10 TABLET ORAL at 12:07

## 2019-01-10 RX ADMIN — NYSTATIN: 100000 POWDER TOPICAL at 05:26

## 2019-01-10 RX ADMIN — PYRIDOSTIGMINE BROMIDE 90 MG: 60 TABLET ORAL at 12:07

## 2019-01-10 RX ADMIN — OXYCODONE HYDROCHLORIDE AND ACETAMINOPHEN 500 MG: 500 TABLET ORAL at 05:26

## 2019-01-10 RX ADMIN — PYRIDOSTIGMINE BROMIDE 90 MG: 60 TABLET ORAL at 05:26

## 2019-01-10 RX ADMIN — MIDODRINE HYDROCHLORIDE 10 MG: 5 TABLET ORAL at 17:32

## 2019-01-10 RX ADMIN — PRAVASTATIN SODIUM 20 MG: 20 TABLET ORAL at 17:32

## 2019-01-10 RX ADMIN — NYSTATIN: 100000 POWDER TOPICAL at 17:33

## 2019-01-10 RX ADMIN — Medication 400 MG: at 05:26

## 2019-01-10 RX ADMIN — AMIODARONE HYDROCHLORIDE 50 MG: 200 TABLET ORAL at 05:26

## 2019-01-10 RX ADMIN — PYRIDOSTIGMINE BROMIDE 90 MG: 60 TABLET ORAL at 17:32

## 2019-01-10 RX ADMIN — SODIUM CHLORIDE TAB 1 GM 1 G: 1 TAB at 17:35

## 2019-01-10 RX ADMIN — SODIUM CHLORIDE TAB 1 GM 1 G: 1 TAB at 07:49

## 2019-01-10 RX ADMIN — GUAIFENESIN 600 MG: 600 TABLET, EXTENDED RELEASE ORAL at 05:26

## 2019-01-10 RX ADMIN — FLUOXETINE HYDROCHLORIDE 20 MG: 20 CAPSULE ORAL at 05:26

## 2019-01-10 RX ADMIN — METOCLOPRAMIDE HYDROCHLORIDE 10 MG: 10 TABLET ORAL at 07:49

## 2019-01-10 RX ADMIN — SODIUM CHLORIDE TAB 1 GM 1 G: 1 TAB at 12:06

## 2019-01-10 RX ADMIN — GUAIFENESIN 600 MG: 600 TABLET, EXTENDED RELEASE ORAL at 17:32

## 2019-01-10 RX ADMIN — METOCLOPRAMIDE HYDROCHLORIDE 10 MG: 10 TABLET ORAL at 17:32

## 2019-01-10 RX ADMIN — MULTIPLE VITAMINS W/ MINERALS TAB 1 TABLET: TAB at 05:26

## 2019-01-10 RX ADMIN — FLUDROCORTISONE ACETATE 0.3 MG: 0.1 TABLET ORAL at 05:26

## 2019-01-10 RX ADMIN — ASPIRIN 81 MG 81 MG: 81 TABLET ORAL at 12:42

## 2019-01-10 RX ADMIN — MIDODRINE HYDROCHLORIDE 10 MG: 5 TABLET ORAL at 12:42

## 2019-01-10 RX ADMIN — MIDODRINE HYDROCHLORIDE 10 MG: 5 TABLET ORAL at 05:26

## 2019-01-10 ASSESSMENT — ENCOUNTER SYMPTOMS
SHORTNESS OF BREATH: 1
NECK PAIN: 0
ABDOMINAL PAIN: 0
DIZZINESS: 1
FEVER: 0
HEADACHES: 0
COUGH: 0
BLURRED VISION: 0
NAUSEA: 0

## 2019-01-10 ASSESSMENT — PAIN SCALES - GENERAL
PAINLEVEL_OUTOF10: 7
PAINLEVEL_OUTOF10: 5

## 2019-01-10 NOTE — CARE PLAN
Problem: Venous Thromboembolism (VTW)/Deep Vein Thrombosis (DVT) Prevention:  Goal: Patient will participate in Venous Thrombosis (VTE)/Deep Vein Thrombosis (DVT)Prevention Measures  Outcome: PROGRESSING AS EXPECTED  Pt on Xarelto for pharmacologic prophylaxis.    Problem: Bowel/Gastric:  Goal: Will not experience complications related to bowel motility  Outcome: PROGRESSING AS EXPECTED  [facility] 01/09/2019.  Pt refusing stool softeners, has no complaints of loose stools or constipation.

## 2019-01-10 NOTE — FACE TO FACE
Face to Face Note  -  Durable Medical Equipment    Lesley Fregoso M.D. - NPI: [id number]  I certify that this patient is under my care and that they had a durable medical equipment(DME)face to face encounter by myself that meets the physician DME face-to-face encounter requirements with this patient on:    Date of encounter:   Patient:                    MRN:                       YOB: 2021  Sebastián Castro  [id number]  1965     The encounter with the patient was in whole, or in part, for the following medical condition, which is the primary reason for durable medical equipment:  Other - debilitating orthostatic hypotension    I certify that, based on my findings, the following durable medical equipment is medically necessary:  Wheel Chair. Patient already has a regular wheelchair which needs to be replaced with a reclining wheelchair do to patient's syncopal episodes even while sitting.     HOME O2 Saturation Measurements:(Values must be present for Home Oxygen orders)         ,     ,         My Clinical findings support the need for the above equipment due to:  Frequent Falls    Supporting Symptoms: patient unable to even sit as he has a syncopal episode and falls. very significant orthostatic hypotension not responding to treatment.

## 2019-01-10 NOTE — PROGRESS NOTES
Assumed care of pt, received report from night shift RN, pt assessed.  Pt complaining of 7/10 back pain, pt repositioned to increase comfort. Pt is A&O x4. Pt moderate fall risk, wearing treaded socks, bed locked and in lowest position, bed alarm is on. Pt instructed to call for assistance prior to getting OOB, pt verbalized understanding.  Call light, and personal belongings within reach.

## 2019-01-10 NOTE — DISCHARGE PLANNING
Anticipated Discharge Disposition: Home with W/C    Action: MD requested reclining w/c for pt. LSW contacted [facility] to confirm what is needed for new W/C to be delivered. LSW informed MD to note change in new order.    LSW contacted [facility] and requested new order to be sent to DME    Barriers to Discharge: None    Plan: Await new W/C delivery

## 2019-01-10 NOTE — PROGRESS NOTES
Patient resting in bed comfortably. Reports slight headache that is better from yesterday. Fall precautions in place. Call light within reach.

## 2019-01-10 NOTE — DISCHARGE PLANNING
MD requested LSW to send prescription for droxidopa to [facility] to be filed. LSW faxed to [facility].

## 2019-01-10 NOTE — PROGRESS NOTES
Hospital Medicine Daily Progress Note    Date of Service  1/10/2019    Chief Complaint  53 y.o. male admitted 12/28/2018 with syncope.    Hospital Course    PMH CHD, DM, HTN, HLD, and recent hospitalization for afib s/p cardioversion who had difficult to control orthostatic hypotension at that time. He had cardiac cath at that time, no cardiac cause of orthostatic hypotension. He was discharged on florinef, salt tabs, midodrine.  He presented with syncope while walking and back pain. He was found with L1 compression fracture. Dr Pepper with NSG recommended TLSO and management for osteoporosis. Had had CT head and carotid doppler that was unremarkable. Recent TTE showed normal EF with mild MR and TR. Telemetry did not show any abnormalities. MRI brain showed chronic changes. TSH was normal. He has orthostatic hypotension that's been difficult to correct despite multiple medications.   He has chronic leg swelling that has been worse since lasix stopped for hypotension. Doppler was negative for DVT bilaterally. He was noted to have right hand cellulitis and treated with clindamycin.        Interval Problem Update  Headache resolved  Right hip pain is minimal. X-ray reviewed by me with no fracture  droxidopa is a speciality medication so unable to start for patient by picking prescription up as it requires paperwork and close monitoring and titration up  Recliner wheelchair  ordered      Consultants/Specialty  None    Code Status  Full    Disposition  F/u [facility] ordered      Review of Systems  Review of Systems   Constitutional: Negative for fever.   HENT: Positive for congestion.    Eyes: Negative for blurred vision.   Respiratory: Positive for shortness of breath (improved). Negative for cough.    Cardiovascular: Positive for leg swelling. Negative for chest pain.   Gastrointestinal: Negative for abdominal pain and nausea.   Musculoskeletal: Negative for neck pain.   Skin: Negative for rash.    Neurological: Positive for dizziness (worse from yesterday). Negative for headaches.        Physical Exam  Temp:  [36.4 °C (97.5 °F)-37.2 °C (98.9 °F)] 36.5 °C (97.7 °F)  Pulse:  [55-69] 63  Resp:  [16-18] 18  BP: (120-176)/(56-85) 130/74    Physical Exam   Constitutional: He is oriented to person, place, and time. He appears well-developed and well-nourished.   HENT:   Head: Normocephalic.   Mouth/Throat: Oropharynx is clear and moist.   Eyes: Conjunctivae are normal. Right eye exhibits no discharge. Left eye exhibits no discharge.   Cardiovascular: Normal rate and regular rhythm.    No murmur heard.  Pulmonary/Chest: Effort normal. No respiratory distress. He has wheezes (more diffuse in b/l lung fields today).    course breath sounds with ronchi scattered throughout lung fields   Abdominal: Soft. There is no tenderness.   Musculoskeletal: He exhibits edema (LE).   Right dorsum hand with abrasion, no erythema/swelling/tenderness   Neurological: He is alert and oriented to person, place, and time.   Skin: Skin is warm and dry.   Nursing note and vitals reviewed.      Fluids    Intake/Output Summary (Last 24 hours) at 01/10/19 1524  Last data filed at 01/10/19 1400   Gross per 24 hour   Intake             1445 ml   Output             2225 ml   Net             -780 ml       Laboratory  Recent Labs      01/08/19   0101   WBC  10.2   RBC  5.00   HEMOGLOBIN  14.0   HEMATOCRIT  44.0   MCV  88.0   MCH  28.0   MCHC  31.8*   RDW  46.4   PLATELETCT  194   MPV  10.1     Recent Labs      01/08/19   0101   SODIUM  135   POTASSIUM  4.0   CHLORIDE  104   CO2  24   GLUCOSE  106*   BUN  30*   CREATININE  0.76   CALCIUM  8.6                   Imaging  DX-HIP-UNILATERAL-WITH PELVIS-1 VIEW RIGHT   Final Result      No radiographic evidence of acute traumatic injury right hip.      DX-CHEST-PORTABLE (1 VIEW)   Final Result      Hypoinflation without other evidence for acute cardiopulmonary disease.      MR-BRAIN-W/O   Final Result       1.  MRI of the brain without contrast within normal limits for age with mild white matter changes.   2.  Chronic appearing sinus disease   3.  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 This does not have an aggressive appearance.      US-EXTREMITY VENOUS LOWER BILAT   Final Result      CT-HAND WITH RIGHT   Final Result      1.  Edema in the dorsum of the forearm and hand without a discrete fluid collection to suggest abscess is identified.      US-CAROTID DOPPLER BILAT   Final Result      DX-LUMBAR SPINE-2 OR 3 VIEWS   Final Result         Stable compression deformity in the L1 vertebral body.      CT-CTA CHEST PULMONARY ARTERY W/ RECONS   Final Result         1. No CT evidence of pulmonary embolism.      2. Old right rib fractures.      3. Cholelithiasis. Enlarged spleen with granulomas.      4. Mild superior endplate compression deformity of L1 could be acute. Correlate with point tenderness. No retropulsion. No malalignment.      CT-CSPINE WITHOUT PLUS RECONS   Final Result         1. No acute fracture from C1 through T3 is visualized.         CT-HEAD W/O   Final Result         1. No acute intracranial abnormality. No evidence of acute intracranial hemorrhage or mass lesion.               DX-THORACIC SPINE-WITH SWIMMERS VIEW   Final Result      No acute thoracic spine fracture or subluxation identified.      DX-CHEST-PORTABLE (1 VIEW)   Final Result      No acute cardiac or pulmonary abnormality is noted.           Assessment/Plan  * Orthostatic hypotension- (present on admission)   Assessment & Plan    Possibly secondary to dysautonomia secondary to diabetes.  Ongoing despite midodrine, florinef, mestinon, reglan, vit B12 and solumedrol, salt tabs, compression stockings  Midodrine dose was decreased due to elevated resting BP  Per cardiology note during last admission, consider adding droxidopa 100mg TID, hospital does not carry   Discussed with pharmacy and will  "decreased mestinon and increase midodrine  Check orthostatics daily  PTOT  Pt admitted recently for similar issues for nearly a month. I discussed with patient about arranging a wheelchair until he is able to follow up with an outpatient specialist and recommend he get a prescription of droxidopa 100mg TID from his PCP. Patient is aggreable to this plan  Pt with syncope even while sitting. Recliner wheelchair has been ordered       Fall from ground level   Assessment & Plan    Fell while sitting at bedside on 1/9. See above     Lumbar compression fracture (HCC)   Assessment & Plan    Pain management as needed.    Neurosurgery recommended TLSO brace.   PT OT recommended home health.    F/u with [facility]     Psychogenic nonepileptic seizure- (present on admission)   Assessment & Plan    Pt had seizure like episode after he fell  Pt well known to me from prior admission. Has been evaluated by neuro and normal EEG even during \"episodes\"  Patient stops seizing while speaking with him  AVOID benzodiazepines     Vitamin B12 deficiency- (present on admission)   Assessment & Plan    Started intramuscular injections       Syncope- (present on admission)   Assessment & Plan    Due to orthostasis.    CT of the head unremarkable. MRI brain showed chronic changes  Carotid Doppler did not reveal significant stenosis  Recent TTE showed normal EF, mild TR and MR  1/9: syncopal episode while sitting at edge of bed. Pt advised to not sit up without assitance     Chronic diastolic heart failure (HCC)- (present on admission)   Assessment & Plan    Without current exacerbation.    Chronic lower extremity edema -chronic venous insufficiency     Acute respiratory failure with hypoxia (HCC)   Assessment & Plan    With cough and congestion  CXR reviewed by me showing atelectesis  Start IS, mucinex. procalcitonin normal     Venous stasis dermatitis of both lower extremities- (present on admission)   Assessment & Plan    Holding " lasix  Compression stockings     Bilateral leg edema- (present on admission)   Assessment & Plan    Appears to have chronic edematous changes.  Likely secondary to venous insufficiency.    Recent TTE showed intact EF  Lasix has been held for orthostatic hypotension  Ultrasound for DVT was study was done and it was negative for DVT  Compression stockings ordered     Paroxysmal A-fib (HCC)- (present on admission)   Assessment & Plan    Continue xarelto and amiodarone  Sinus rhythm  Patient's [id number] is 3 indicating 4.6% of stroke/tia/systemic embolisation per year. HASBLED score is 2 indicating 4.1% bleeding risk. Due to patient's significant orthostatic hypotension and recurrent falls the risks of bleeding outweigh the benefit of anticoagulation at this point of time.  I discussed this in detail with patient and he is agreeable.  Educated patient that if at some point in time his orthostatic hypotension and falls resolve/improve then at that time might be a good time to resume anticoagulation.  I also discussed with him that if he did want to resume anticoagulation that I would recommend warfarin as there is a reversal agent for this.  Started on aspirin        Essential hypertension- (present on admission)   Assessment & Plan    Lasix held for orthostatic hypotension  Midodrine reduced for elevated resting BP     Mixed hyperlipidemia- (present on admission)   Assessment & Plan    Continue current therapy           VTE prophylaxis: xarelto

## 2019-01-10 NOTE — DISCHARGE PLANNING
Agency/Facility Name: [facility]  Outcome: Updated referral for new wheelchair faxed at 1100.

## 2019-01-11 PROCEDURE — A9270 NON-COVERED ITEM OR SERVICE: HCPCS | Performed by: INTERNAL MEDICINE

## 2019-01-11 PROCEDURE — 700102 HCHG RX REV CODE 250 W/ 637 OVERRIDE(OP): Performed by: HOSPITALIST

## 2019-01-11 PROCEDURE — 99225 PR SUBSEQUENT OBSERVATION CARE,LEVEL II: CPT | Performed by: HOSPITALIST

## 2019-01-11 PROCEDURE — A9270 NON-COVERED ITEM OR SERVICE: HCPCS | Performed by: HOSPITALIST

## 2019-01-11 PROCEDURE — G0378 HOSPITAL OBSERVATION PER HR: HCPCS

## 2019-01-11 PROCEDURE — 97530 THERAPEUTIC ACTIVITIES: CPT

## 2019-01-11 PROCEDURE — 700102 HCHG RX REV CODE 250 W/ 637 OVERRIDE(OP): Performed by: INTERNAL MEDICINE

## 2019-01-11 PROCEDURE — 97535 SELF CARE MNGMENT TRAINING: CPT | Mod: XE

## 2019-01-11 PROCEDURE — 97535 SELF CARE MNGMENT TRAINING: CPT | Mod: XU

## 2019-01-11 RX ADMIN — METOCLOPRAMIDE HYDROCHLORIDE 10 MG: 10 TABLET ORAL at 06:00

## 2019-01-11 RX ADMIN — AMIODARONE HYDROCHLORIDE 50 MG: 200 TABLET ORAL at 06:00

## 2019-01-11 RX ADMIN — SODIUM CHLORIDE TAB 1 GM 1 G: 1 TAB at 17:46

## 2019-01-11 RX ADMIN — OXYCODONE HYDROCHLORIDE AND ACETAMINOPHEN 500 MG: 500 TABLET ORAL at 06:03

## 2019-01-11 RX ADMIN — GUAIFENESIN 600 MG: 600 TABLET, EXTENDED RELEASE ORAL at 17:46

## 2019-01-11 RX ADMIN — METOCLOPRAMIDE HYDROCHLORIDE 10 MG: 10 TABLET ORAL at 11:40

## 2019-01-11 RX ADMIN — GUAIFENESIN 600 MG: 600 TABLET, EXTENDED RELEASE ORAL at 05:59

## 2019-01-11 RX ADMIN — MIDODRINE HYDROCHLORIDE 10 MG: 5 TABLET ORAL at 11:41

## 2019-01-11 RX ADMIN — METOCLOPRAMIDE HYDROCHLORIDE 10 MG: 10 TABLET ORAL at 17:46

## 2019-01-11 RX ADMIN — SODIUM CHLORIDE TAB 1 GM 1 G: 1 TAB at 11:41

## 2019-01-11 RX ADMIN — MIDODRINE HYDROCHLORIDE 10 MG: 5 TABLET ORAL at 17:46

## 2019-01-11 RX ADMIN — PYRIDOSTIGMINE BROMIDE 90 MG: 60 TABLET ORAL at 06:01

## 2019-01-11 RX ADMIN — MULTIPLE VITAMINS W/ MINERALS TAB 1 TABLET: TAB at 06:03

## 2019-01-11 RX ADMIN — MIDODRINE HYDROCHLORIDE 10 MG: 5 TABLET ORAL at 05:59

## 2019-01-11 RX ADMIN — ASPIRIN 81 MG 81 MG: 81 TABLET ORAL at 05:59

## 2019-01-11 RX ADMIN — PRAVASTATIN SODIUM 20 MG: 20 TABLET ORAL at 17:46

## 2019-01-11 RX ADMIN — FLUDROCORTISONE ACETATE 0.3 MG: 0.1 TABLET ORAL at 06:02

## 2019-01-11 RX ADMIN — Medication 400 MG: at 05:59

## 2019-01-11 RX ADMIN — FLUOXETINE HYDROCHLORIDE 20 MG: 20 CAPSULE ORAL at 05:59

## 2019-01-11 RX ADMIN — NYSTATIN: 100000 POWDER TOPICAL at 06:03

## 2019-01-11 RX ADMIN — PYRIDOSTIGMINE BROMIDE 90 MG: 60 TABLET ORAL at 17:46

## 2019-01-11 RX ADMIN — NYSTATIN: 100000 POWDER TOPICAL at 17:46

## 2019-01-11 RX ADMIN — SODIUM CHLORIDE TAB 1 GM 1 G: 1 TAB at 07:32

## 2019-01-11 RX ADMIN — PYRIDOSTIGMINE BROMIDE 90 MG: 60 TABLET ORAL at 11:40

## 2019-01-11 ASSESSMENT — PAIN SCALES - GENERAL
PAINLEVEL_OUTOF10: 7

## 2019-01-11 ASSESSMENT — COGNITIVE AND FUNCTIONAL STATUS - GENERAL
STANDING UP FROM CHAIR USING ARMS: A LOT
DRESSING REGULAR LOWER BODY CLOTHING: A LITTLE
WALKING IN HOSPITAL ROOM: A LOT
CLIMB 3 TO 5 STEPS WITH RAILING: TOTAL
PERSONAL GROOMING: A LITTLE
MOVING FROM LYING ON BACK TO SITTING ON SIDE OF FLAT BED: UNABLE
SUGGESTED CMS G CODE MODIFIER DAILY ACTIVITY: CK
SUGGESTED CMS G CODE MODIFIER MOBILITY: CL
HELP NEEDED FOR BATHING: A LOT
MOBILITY SCORE: 14
DAILY ACTIVITIY SCORE: 18
TOILETING: A LOT

## 2019-01-11 ASSESSMENT — GAIT ASSESSMENTS: GAIT LEVEL OF ASSIST: REFUSED

## 2019-01-11 ASSESSMENT — ENCOUNTER SYMPTOMS
CHILLS: 0
BLURRED VISION: 0
NAUSEA: 0
SHORTNESS OF BREATH: 0
NECK PAIN: 0
DOUBLE VISION: 0
DIZZINESS: 1
FEVER: 0
COUGH: 0
HEADACHES: 0
ABDOMINAL PAIN: 0

## 2019-01-11 NOTE — CARE PLAN
Problem: Safety  Goal: Will remain free from injury    Intervention: Provide assistance with mobility  Safety teaching reinforced

## 2019-01-11 NOTE — THERAPY
"Occupational Therapy Treatment completed with focus on ADL transfers.  Functional Status: Pt seen for OT session. Pt supine>sit with SPV. Don socks with SPV/set-up and req mod A for don/doff of LSO. Pt c/o dizziness which did not resolve. Sit>stand x2 with c/o dizziness. Requested to return to supine. Dizziness did not resolve in supine.  Plan of Care: Will benefit from Occupational Therapy 3 times per week  Discharge Recommendations:  Equipment Mechanical lift, and will continue to assess for equipment. Post-acute therapy: Recommend home health for continued occupational therapy services      See \"Rehab Therapy-Acute\" Patient Summary Report for complete documentation.     Pt seen for OT session. Pt showing progress with seated ADLs and strength, but continues to be limited by orthostatic hypotension, functional ambulation, balance, and activity tolerance. Recommend acute OT, as well as Recommend home health for continued occupational therapy services.  "

## 2019-01-11 NOTE — FACE TO FACE
Face to Face Note  -  Durable Medical Equipment    Lesley Fregoso M.D. - NPI: [id number]  I certify that this patient is under my care and that they have had a durable medical equipment(DME)face to face encounter by myself that meets the physician DME face-to-face encounter requirements with this patient on:    Date of encounter:   Patient:                    MRN:                       YOB: 2021  Sebastián Castro  [id number]  1965     The encounter with the patient was in whole, or in part, for the following medical condition, which is the primary reason for durable medical equipment:  Other - debilitating orthostatic hypotension with frequent falls    I certify that, based on my findings, the following durable medical equipment is medically necessary:  Other DME Equipment - Travis lift.    HOME O2 Saturation Measurements:(Values must be present for Home Oxygen orders)         ,     ,         My Clinical findings support the need for the above equipment due to:  Frequent Falls    Supporting Symptoms: recurrent syncopal episodes     ------------------------------------------------------------------------------------------------------------------    Face to Face Supporting Documentation - Home Health    The encounter with this patient was in whole or in part the primary reason for home health admission.    Date of encounter:   Patient:                    MRN:                       YOB: 2021  Sebastián Castro  [id number]  1965     Home health to see patient for:  Skilled Nursing care for assessment, interventions & education, Home health aide, Physical Therapy evaluation and treatment and Occupational therapy evaluation and treatment    Skilled need for:  Exacerbation of Chronic Disease State orthostatic hypotension    Skilled nursing interventions to include:  Comment: for education and assitance of deblitating orthostatic hypotension    Homebound evidenced status by:  Need  the aid of supportive devices such as crutches, canes, wheelchairs or walkers, Require the use of special transportation or Needs the assistance of another person in order to leave the home. Leaving home must require a considerable and taxing effort. There must exist a normal inability to leave the home.    Community Physician to provide follow up care: German Hinton M.D.     Optional Interventions    Wound information & treatment:    Home Infusion Therapy orders:    Line/Drain/Airway:    I certify the face to face encounter for this home care referral meets the CMS requirements and the encounter/clinical assessment with the patient was, in whole, or in part, for the medical condition(s) listed above, which is the primary reason for home health care. Based on my clinical findings: the service(s) are medically necessary, support the need for home health care, and the homebound criteria are met.  I certify that this patient has had a face to face encounter by myself.  Lesley Fregoso M.D. - NPI: [id number]    *Debility, frailty and advanced age in the absence of an acute deterioration or exacerbation of a condition do not qualify a patient for home health.

## 2019-01-11 NOTE — DISCHARGE PLANNING
Received Choice form at 3120  Agency/Facility Name: [facility]  Referral sent per Choice form @ 1748

## 2019-01-11 NOTE — DISCHARGE PLANNING
Referral sent to [facility] for larry lift, and 3:1 commode.  Spoke with Sharmaine, she will review referral request.  Per Sharmaine they larry lift will need insurance authorization and this could at a few days..

## 2019-01-11 NOTE — DISCHARGE PLANNING
Spoke with Niyah at [facility], they have been advised of patient discharging.  Per Niyah they have accepted patient on service.  Call [facility] spoke with Sharmaine they have submitted referral for Travis and and 3:1 commdharmesh.  Per Sharmaine she will call patient and confirm delivery.

## 2019-01-11 NOTE — DISCHARGE PLANNING
Anticipated Discharge Disposition: Home with Home Health, wheelchair, larry.     Action: LSW informed by PT that pt will need HomeHealth, wheelchair and larry when discharged. Wheelchair at bedside. CHOICE for DME faxed to [facility]. [facility] accepted prior, new order received. CHOICE for HomeHealth sent to [facility].     LSW discussed progress with pt. Pt stated he was concerned about education provided to girlfriend regarding larry. LSW informed pt education will be provided in facility. Ordered larry will be delivered to his house. Pt expressed understanding and agreeable.     Barriers to Discharge: None.    Plan: Await HomeHealth acceptance and confirmation of DME.

## 2019-01-11 NOTE — PROGRESS NOTES
Hospital Medicine Daily Progress Note    Date of Service  1/11/2019    Chief Complaint  53 y.o. male admitted 12/28/2018 with syncope.    Hospital Course    PMH CHD, DM, HTN, HLD, and recent hospitalization for afib s/p cardioversion who had difficult to control orthostatic hypotension at that time. He had right and left cardiac cath at that time and no cardiac cause of orthostatic hypotension was identified. He was discharged on florinef, salt tabs, midodrine.  He presented with syncope while walking and back pain. He was found with L1 compression fracture. Dr Pepper with NSG recommended TLSO and management for osteoporosis. Had had CT head and carotid doppler that was unremarkable. Recent TTE showed normal EF with mild MR and TR. MRI brain showed chronic changes. TSH was normal. He has orthostatic hypotension that's been difficult to correct despite multiple medications.   He has chronic leg swelling that has been worse since lasix stopped for hypotension. Doppler was negative for DVT bilaterally. He was noted to have right hand cellulitis and treated with clindamycin.  He continues to feel dizzy at times even when laying down. Suffered from a syncopal episode while sitting at edge of bed resulting in fall. Code blue was called as patient was unresponsive for several seconds. This followed by a psychogenic non epileptic seizure. Luckily patient did not suffer from any significant trauma from this fall. Upon discussion, patient's xarelto was discontinued as we both felt the risks of bleeding at this time outweigh the benefits due to his recurrent falls. Attempt was made to get droxidopa as an outpatient started in the hospital but this is a specialized medication which requires documents to be completed for initiation.  Recliner wheelchair has been arranged for patient and he understands he will likely need to stay in that position due to his debilitating orthostatic hypotension. He will also need outpatient  arrangement of some orthostatic hypotension specialist.        Interval Problem Update  I discussed with neuro about assisting in starting droxidopa but Dr. Childress stated patient would need a specialist.   Recliner wheelchair has been arranged for patient. As he lives only with his s/o (Angelika) will d/w her and PT about possiblity of larry lift and if she would be able to use it. Will order bedside commode for home and will need to ensure patient is able to do safe transfers from bed to wheelchair. Commode has been ordered if patient is able to use, but I discussed with patient that bed pan would be the safest option  Patient feels well right now  His SOB and cough have resolved      Consultants/Specialty  None    Code Status  Full    Disposition  F/u [facility] health ordered      Review of Systems  Review of Systems   Constitutional: Negative for chills and fever.   HENT: Negative for congestion.    Eyes: Negative for blurred vision and double vision.   Respiratory: Negative for cough and shortness of breath (resolved).    Cardiovascular: Positive for leg swelling. Negative for chest pain.   Gastrointestinal: Negative for abdominal pain and nausea.   Musculoskeletal: Negative for neck pain.   Skin: Negative for rash.   Neurological: Positive for dizziness (improved today). Negative for headaches.        Physical Exam  Temp:  [36.4 °C (97.5 °F)-36.9 °C (98.5 °F)] 36.8 °C (98.3 °F)  Pulse:  [60-88] 88  Resp:  [17-19] 17  BP: ()/(55-85) 98/66    Physical Exam   Constitutional: He is oriented to person, place, and time. He appears well-developed and well-nourished.   HENT:   Head: Normocephalic.   Mouth/Throat: Oropharynx is clear and moist.   Eyes: Conjunctivae are normal. Right eye exhibits no discharge. Left eye exhibits no discharge.   Cardiovascular: Normal rate and regular rhythm.    No murmur heard.  Pulmonary/Chest: Effort normal. No respiratory distress. He has no wheezes (resolved).   Clear breath  sounds throughout   Abdominal: Soft. There is no tenderness.   Musculoskeletal: He exhibits edema (LE). He exhibits no tenderness.   Neurological: He is alert and oriented to person, place, and time.   Skin: Skin is warm and dry.   Nursing note and vitals reviewed.      Fluids    Intake/Output Summary (Last 24 hours) at 01/11/19 2028  Last data filed at 01/11/19 1458   Gross per 24 hour   Intake              740 ml   Output             1000 ml   Net             -260 ml       Laboratory                        Imaging  DX-HIP-UNILATERAL-WITH PELVIS-1 VIEW RIGHT   Final Result      No radiographic evidence of acute traumatic injury right hip.      DX-CHEST-PORTABLE (1 VIEW)   Final Result      Hypoinflation without other evidence for acute cardiopulmonary disease.      MR-BRAIN-W/O   Final Result      1.  MRI of the brain without contrast within normal limits for age with mild white matter changes.   2.  Chronic appearing sinus disease   3.  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 This does not have an aggressive appearance.      US-EXTREMITY VENOUS LOWER BILAT   Final Result      CT-HAND WITH RIGHT   Final Result      1.  Edema in the dorsum of the forearm and hand without a discrete fluid collection to suggest abscess is identified.      US-CAROTID DOPPLER BILAT   Final Result      DX-LUMBAR SPINE-2 OR 3 VIEWS   Final Result         Stable compression deformity in the L1 vertebral body.      CT-CTA CHEST PULMONARY ARTERY W/ RECONS   Final Result         1. No CT evidence of pulmonary embolism.      2. Old right rib fractures.      3. Cholelithiasis. Enlarged spleen with granulomas.      4. Mild superior endplate compression deformity of L1 could be acute. Correlate with point tenderness. No retropulsion. No malalignment.      CT-CSPINE WITHOUT PLUS RECONS   Final Result         1. No acute fracture from C1 through T3 is visualized.         CT-HEAD W/O   Final Result  "        1. No acute intracranial abnormality. No evidence of acute intracranial hemorrhage or mass lesion.               DX-THORACIC SPINE-WITH SWIMMERS VIEW   Final Result      No acute thoracic spine fracture or subluxation identified.      DX-CHEST-PORTABLE (1 VIEW)   Final Result      No acute cardiac or pulmonary abnormality is noted.           Assessment/Plan  * Orthostatic hypotension- (present on admission)   Assessment & Plan    Possibly secondary to dysautonomia secondary to diabetes. Recently admitted for this issue for almost a mohan  Ongoing despite midodrine, florinef, mestinon, reglan, vit B12 and solumedrol, salt tabs, compression stockings  PTOT  Recliner wheelchair, larry, and bedside commode have been ordered  Attempt was made to work with SW to  outpatient prescription of droxidopa so we may start in house but it is unfortunately a very specialized medication. I discussed with Dr. Childress about neurology assistance with starting this medication but he recommended outpatient follow up  Ultimate plan is to arrange for a safe discharge with patient's current condition and severe symptoms which are not being controlled with above measures         Fall from ground level   Assessment & Plan    Fell while sitting at bedside on 1/9. See above     Lumbar compression fracture (HCC)   Assessment & Plan    Pain management as needed.    Neurosurgery recommended TLSO brace.   PT OT recommended home health.    F/u with [facility]     Psychogenic nonepileptic seizure- (present on admission)   Assessment & Plan    Pt had seizure like episode after he fell  Pt well known to me from prior admission. Has been evaluated by neuro and normal EEG even during \"episodes\"  Patient stops seizing while speaking with him  AVOID benzodiazepines     Vitamin B12 deficiency- (present on admission)   Assessment & Plan    Started intramuscular injections       Syncope- (present on admission)   Assessment & Plan    Due to " orthostasis.    CT of the head unremarkable. MRI brain showed chronic changes  Carotid Doppler did not reveal significant stenosis  Recent TTE showed normal EF, mild TR and MR  1/9: syncopal episode while sitting at edge of bed. Pt advised to not sit up without assitance     Chronic diastolic heart failure (HCC)- (present on admission)   Assessment & Plan    Without current exacerbation.    Chronic lower extremity edema -chronic venous insufficiency     Acute respiratory failure with hypoxia (HCC)   Assessment & Plan    With cough and congestion  CXR reviewed by me showing atelectesis  Start IS, mucinex. procalcitonin normal    resolved     Venous stasis dermatitis of both lower extremities- (present on admission)   Assessment & Plan    Holding lasix  Compression stockings     Bilateral leg edema- (present on admission)   Assessment & Plan    Appears to have chronic edematous changes.  Likely secondary to venous insufficiency.    Recent TTE showed intact EF  Lasix has been held for orthostatic hypotension  Ultrasound for DVT was study was done and it was negative for DVT  Compression stockings ordered     Paroxysmal A-fib (HCC)- (present on admission)   Assessment & Plan    Continue xarelto and amiodarone  Sinus rhythm  Patient's [id number] is 3 indicating 4.6% of stroke/tia/systemic embolisation per year. HASBLED score is 2 indicating 4.1% bleeding risk. Due to patient's significant orthostatic hypotension and recurrent falls the risks of bleeding outweigh the benefit of anticoagulation at this point of time.  I discussed this in detail with patient and he is agreeable.  Educated patient that if at some point in time his orthostatic hypotension and falls resolve/improve then at that time might be a good time to resume anticoagulation.  I also discussed with him that if he did want to resume anticoagulation that I would recommend warfarin as there is a reversal agent for this.  Started on aspirin        Essential  hypertension- (present on admission)   Assessment & Plan    Lasix held for orthostatic hypotension  Midodrine will result in supine hypertension but this is necessary to try to optomize orthostatic hypotension     Mixed hyperlipidemia- (present on admission)   Assessment & Plan    Continue current therapy           VTE prophylaxis: SCDs

## 2019-01-11 NOTE — DISCHARGE PLANNING
ATTN: Case Management  RE: Referral for Home Health    As of 1/11/2019, we have accepted the Home Health referral for the patient listed above.    A [facility] clinician will be out to see the patient within 48 hours. If you have any questions or concerns regarding the patient’s transition to Home Health, please do not hesitate to contact us at [phone].      We look forward to collaborating with you,  [facility] Health Team

## 2019-01-12 PROCEDURE — 700102 HCHG RX REV CODE 250 W/ 637 OVERRIDE(OP): Performed by: INTERNAL MEDICINE

## 2019-01-12 PROCEDURE — G0378 HOSPITAL OBSERVATION PER HR: HCPCS

## 2019-01-12 PROCEDURE — 99225 PR SUBSEQUENT OBSERVATION CARE,LEVEL II: CPT | Performed by: HOSPITALIST

## 2019-01-12 PROCEDURE — A9270 NON-COVERED ITEM OR SERVICE: HCPCS | Performed by: INTERNAL MEDICINE

## 2019-01-12 PROCEDURE — A9270 NON-COVERED ITEM OR SERVICE: HCPCS | Performed by: HOSPITALIST

## 2019-01-12 PROCEDURE — 700102 HCHG RX REV CODE 250 W/ 637 OVERRIDE(OP): Performed by: HOSPITALIST

## 2019-01-12 RX ADMIN — AMIODARONE HYDROCHLORIDE 50 MG: 200 TABLET ORAL at 06:03

## 2019-01-12 RX ADMIN — SODIUM CHLORIDE TAB 1 GM 1 G: 1 TAB at 16:44

## 2019-01-12 RX ADMIN — Medication 400 MG: at 06:04

## 2019-01-12 RX ADMIN — PYRIDOSTIGMINE BROMIDE 90 MG: 60 TABLET ORAL at 06:11

## 2019-01-12 RX ADMIN — METOCLOPRAMIDE HYDROCHLORIDE 10 MG: 10 TABLET ORAL at 06:04

## 2019-01-12 RX ADMIN — SODIUM CHLORIDE TAB 1 GM 1 G: 1 TAB at 11:07

## 2019-01-12 RX ADMIN — GUAIFENESIN 600 MG: 600 TABLET, EXTENDED RELEASE ORAL at 16:43

## 2019-01-12 RX ADMIN — SODIUM CHLORIDE TAB 1 GM 1 G: 1 TAB at 07:41

## 2019-01-12 RX ADMIN — METOCLOPRAMIDE HYDROCHLORIDE 10 MG: 10 TABLET ORAL at 16:43

## 2019-01-12 RX ADMIN — METOCLOPRAMIDE HYDROCHLORIDE 10 MG: 10 TABLET ORAL at 11:07

## 2019-01-12 RX ADMIN — MULTIPLE VITAMINS W/ MINERALS TAB 1 TABLET: TAB at 06:04

## 2019-01-12 RX ADMIN — NYSTATIN: 100000 POWDER TOPICAL at 06:11

## 2019-01-12 RX ADMIN — PYRIDOSTIGMINE BROMIDE 90 MG: 60 TABLET ORAL at 16:43

## 2019-01-12 RX ADMIN — MIDODRINE HYDROCHLORIDE 10 MG: 5 TABLET ORAL at 06:03

## 2019-01-12 RX ADMIN — MIDODRINE HYDROCHLORIDE 10 MG: 5 TABLET ORAL at 16:44

## 2019-01-12 RX ADMIN — GUAIFENESIN 600 MG: 600 TABLET, EXTENDED RELEASE ORAL at 06:03

## 2019-01-12 RX ADMIN — ASPIRIN 81 MG 81 MG: 81 TABLET ORAL at 06:04

## 2019-01-12 RX ADMIN — OXYCODONE HYDROCHLORIDE AND ACETAMINOPHEN 500 MG: 500 TABLET ORAL at 06:04

## 2019-01-12 RX ADMIN — PYRIDOSTIGMINE BROMIDE 90 MG: 60 TABLET ORAL at 11:05

## 2019-01-12 RX ADMIN — FLUOXETINE HYDROCHLORIDE 20 MG: 20 CAPSULE ORAL at 06:03

## 2019-01-12 RX ADMIN — MIDODRINE HYDROCHLORIDE 10 MG: 5 TABLET ORAL at 11:07

## 2019-01-12 RX ADMIN — FLUDROCORTISONE ACETATE 0.3 MG: 0.1 TABLET ORAL at 06:11

## 2019-01-12 ASSESSMENT — ENCOUNTER SYMPTOMS
BLURRED VISION: 0
NECK PAIN: 0
NAUSEA: 0
SHORTNESS OF BREATH: 0
FEVER: 0
DIZZINESS: 0
ABDOMINAL PAIN: 0
DOUBLE VISION: 0
CHILLS: 0
COUGH: 0
HEADACHES: 0

## 2019-01-12 ASSESSMENT — PAIN SCALES - GENERAL
PAINLEVEL_OUTOF10: 7
PAINLEVEL_OUTOF10: 7

## 2019-01-12 NOTE — PROGRESS NOTES
Report received. Pt lying in bed, awake, A&Ox4, no s/s distress noted, bed in low position, bed alarm on, call light in reach, instructed pt to use call light for assistance.

## 2019-01-12 NOTE — CARE PLAN
Problem: Pain Management  Goal: Pain level will decrease to patient's comfort goal  Outcome: PROGRESSING AS EXPECTED  Pain controlled with meds and repositioning.    Problem: Safety  Goal: Will remain free from injury  Outcome: PROGRESSING AS EXPECTED  Bed in low position, bed alarm on, call light in reach, instructed pt to use call light for assistance, all belongings in reach.

## 2019-01-12 NOTE — PROGRESS NOTES
Hospital Medicine Daily Progress Note    Date of Service  1/12/2019    Chief Complaint  53 y.o. male admitted 12/28/2018 with syncope.    Hospital Course    PMH CHD, DM, HTN, HLD, and recent hospitalization for afib s/p cardioversion who had difficult to control orthostatic hypotension at that time. He had right and left cardiac cath at that time and no cardiac cause of orthostatic hypotension was identified. He was discharged on florinef, salt tabs, midodrine.  He presented with syncope while walking and back pain. He was found with L1 compression fracture. Dr Pepper with NSG recommended TLSO and management for osteoporosis. Had had CT head and carotid doppler that was unremarkable. Recent TTE showed normal EF with mild MR and TR. MRI brain showed chronic changes. TSH was normal. He has orthostatic hypotension that's been difficult to correct despite multiple medications.   He has chronic leg swelling that has been worse since lasix stopped for hypotension. Doppler was negative for DVT bilaterally. He was noted to have right hand cellulitis and treated with clindamycin.  He continues to feel dizzy at times even when laying down. Suffered from a syncopal episode while sitting at edge of bed resulting in fall. Code blue was called as patient was unresponsive for several seconds. This followed by a psychogenic non epileptic seizure. Luckily patient did not suffer from any significant trauma from this fall. Upon discussion, patient's xarelto was discontinued as we both felt the risks of bleeding at this time outweigh the benefits due to his recurrent falls. Attempt was made to get droxidopa as an outpatient started in the hospital but this is a specialized medication which requires documents to be completed for initiation.  Recliner wheelchair, larry lift, and bedside commode has been arranged for patient and he understands he will likely need to stay in that position due to his debilitating orthostatic hypotension. He  will also need outpatient arrangement of some orthostatic hypotension specialist.        Interval Problem Update  Stood up last night and did not feel dizzy  Feeling well this morning as well   His s/o at bedside and POC discussed  Will plan for discharge once larry lift arrives      Consultants/Specialty  None    Code Status  Full    Disposition  F/u [facility]  Home with HH. Pending arrival of larry lift at home to ensure safe discharge plan.      Review of Systems  Review of Systems   Constitutional: Negative for chills and fever.   HENT: Negative for congestion.    Eyes: Negative for blurred vision and double vision.   Respiratory: Negative for cough and shortness of breath (resolved).    Cardiovascular: Positive for leg swelling. Negative for chest pain.   Gastrointestinal: Negative for abdominal pain and nausea.   Musculoskeletal: Negative for neck pain.   Skin: Negative for rash.   Neurological: Negative for dizziness (absent today) and headaches.        Physical Exam  Temp:  [36.4 °C (97.6 °F)-37 °C (98.6 °F)] 36.7 °C (98.1 °F)  Pulse:  [65-67] 65  Resp:  [17-20] 18  BP: ()/(61-83) 95/63    Physical Exam   Constitutional: He is oriented to person, place, and time. He appears well-developed and well-nourished.   HENT:   Head: Normocephalic.   Mouth/Throat: Oropharynx is clear and moist.   Eyes: Conjunctivae are normal. Right eye exhibits no discharge. Left eye exhibits no discharge.   Cardiovascular: Normal rate and regular rhythm.    No murmur heard.  Pulmonary/Chest: Effort normal and breath sounds normal. No respiratory distress. He has no wheezes.   Abdominal: Soft. There is no tenderness.   Musculoskeletal: He exhibits edema (LE). He exhibits no tenderness.   Neurological: He is alert and oriented to person, place, and time.   Skin: Skin is warm and dry.   Nursing note and vitals reviewed.      Fluids    Intake/Output Summary (Last 24 hours) at 01/12/19 1834  Last data filed at 01/12/19 0600    Gross per 24 hour   Intake                0 ml   Output              200 ml   Net             -200 ml       Laboratory                        Imaging  DX-HIP-UNILATERAL-WITH PELVIS-1 VIEW RIGHT   Final Result      No radiographic evidence of acute traumatic injury right hip.      DX-CHEST-PORTABLE (1 VIEW)   Final Result      Hypoinflation without other evidence for acute cardiopulmonary disease.      MR-BRAIN-W/O   Final Result      1.  MRI of the brain without contrast within normal limits for age with mild white matter changes.   2.  Chronic appearing sinus disease   3.  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 This does not have an aggressive appearance.      US-EXTREMITY VENOUS LOWER BILAT   Final Result      CT-HAND WITH RIGHT   Final Result      1.  Edema in the dorsum of the forearm and hand without a discrete fluid collection to suggest abscess is identified.      US-CAROTID DOPPLER BILAT   Final Result      DX-LUMBAR SPINE-2 OR 3 VIEWS   Final Result         Stable compression deformity in the L1 vertebral body.      CT-CTA CHEST PULMONARY ARTERY W/ RECONS   Final Result         1. No CT evidence of pulmonary embolism.      2. Old right rib fractures.      3. Cholelithiasis. Enlarged spleen with granulomas.      4. Mild superior endplate compression deformity of L1 could be acute. Correlate with point tenderness. No retropulsion. No malalignment.      CT-CSPINE WITHOUT PLUS RECONS   Final Result         1. No acute fracture from C1 through T3 is visualized.         CT-HEAD W/O   Final Result         1. No acute intracranial abnormality. No evidence of acute intracranial hemorrhage or mass lesion.               DX-THORACIC SPINE-WITH SWIMMERS VIEW   Final Result      No acute thoracic spine fracture or subluxation identified.      DX-CHEST-PORTABLE (1 VIEW)   Final Result      No acute cardiac or pulmonary abnormality is noted.           Assessment/Plan  *  "Orthostatic hypotension- (present on admission)   Assessment & Plan    Possibly secondary to dysautonomia secondary to diabetes. Recently admitted for this issue for almost a mohan  Ongoing despite midodrine, florinef, mestinon, reglan, vit B12 and solumedrol, salt tabs, compression stockings  PTOT  Recliner wheelchair, larry, and bedside commode have been ordered  Attempt was made to work with SW to  outpatient prescription of droxidopa so we may start in house but it is unfortunately a very specialized medication. I discussed with Dr. Childress about neurology assistance with starting this medication but he recommended outpatient follow up  Ultimate plan is to arrange for a safe discharge with patient's current condition and severe symptoms which are not being controlled with above measures         Fall from ground level   Assessment & Plan    Fell while sitting at bedside on 1/9. See above     Lumbar compression fracture (HCC)   Assessment & Plan    Pain management as needed.    Neurosurgery recommended TLSO brace.   PT OT recommended home health.    F/u with [facility]     Psychogenic nonepileptic seizure- (present on admission)   Assessment & Plan    Pt had seizure like episode after he fell  Pt well known to me from prior admission. Has been evaluated by neuro and normal EEG even during \"episodes\"  Patient stops seizing while speaking with him  AVOID benzodiazepines     Vitamin B12 deficiency- (present on admission)   Assessment & Plan    Started intramuscular injections       Syncope- (present on admission)   Assessment & Plan    Due to orthostasis.    CT of the head unremarkable. MRI brain showed chronic changes  Carotid Doppler did not reveal significant stenosis  Recent TTE showed normal EF, mild TR and MR  1/9: syncopal episode while sitting at edge of bed. Pt advised to not sit up without assitance     Chronic diastolic heart failure (HCC)- (present on admission)   Assessment & Plan    Without " current exacerbation.    Chronic lower extremity edema -chronic venous insufficiency     Acute respiratory failure with hypoxia (HCC)   Assessment & Plan    With cough and congestion  CXR reviewed by me showing atelectesis  Start IS, mucinex. procalcitonin normal    resolved     Venous stasis dermatitis of both lower extremities- (present on admission)   Assessment & Plan    Holding lasix  Compression stockings     Bilateral leg edema- (present on admission)   Assessment & Plan    Appears to have chronic edematous changes.  Likely secondary to venous insufficiency.    Recent TTE showed intact EF  Lasix has been held for orthostatic hypotension  Ultrasound for DVT was study was done and it was negative for DVT  Compression stockings ordered     Paroxysmal A-fib (HCC)- (present on admission)   Assessment & Plan    Continue xarelto and amiodarone  Sinus rhythm  Patient's [id number] is 3 indicating 4.6% of stroke/tia/systemic embolisation per year. HASBLED score is 2 indicating 4.1% bleeding risk. Due to patient's significant orthostatic hypotension and recurrent falls the risks of bleeding outweigh the benefit of anticoagulation at this point of time.  I discussed this in detail with patient and he is agreeable.  Educated patient that if at some point in time his orthostatic hypotension and falls resolve/improve then at that time might be a good time to resume anticoagulation.  I also discussed with him that if he did want to resume anticoagulation that I would recommend warfarin as there is a reversal agent for this.  Started on aspirin        Essential hypertension- (present on admission)   Assessment & Plan    Lasix held for orthostatic hypotension  Midodrine will result in supine hypertension but this is necessary to try to optomize orthostatic hypotension     Mixed hyperlipidemia- (present on admission)   Assessment & Plan    Continue current therapy           VTE prophylaxis: SCDs

## 2019-01-12 NOTE — THERAPY
"Physical Therapy Treatment completed.   Bed Mobility:  Supine to Sit: Supervised  Transfers: Sit to Stand: Contact Guard Assist  Gait: Level Of Assist: Refused       Plan of Care: Will benefit from Physical Therapy 3 times per week  Discharge Recommendations: Equipment: larry lift. Post-acute therapy Discharge to home with outpatient or home health for additional skilled therapy services.     See \"Rehab Therapy-Acute\" Patient Summary Report for complete documentation.     Pt continues to have significant issues with dizziness and light headedness in sitting and standing. Pt unable to transfer to  today due to dizziness immediately upon sitting and worsening with sit to stand. Long discussion with Pt regarding limited mobility and possible safest option to get home would be to get a larry lift to allow for mobility and safety with SO while receiving home health PT to continue working on transfers and position changes. PT to continue working with Pt in acute setting but strength and coordination are not really the issue, he is mostly only limited by his dizziness and lightheadedness.  "

## 2019-01-12 NOTE — CARE PLAN
Problem: Pain Management  Goal: Pain level will decrease to patient's comfort goal  Outcome: MET Date Met: 01/12/19  Pt with no complaints of pain. Pt educated on pain medication and when to ask for pain medication.     Problem: Safety  Goal: Will remain free from injury  Outcome: PROGRESSING SLOWER THAN EXPECTED  Pt remains severely orthostatic, had a fall three days ago. Pt educated on fall risk and not to get out of bed or even sit at bedside without staff present. Pt verbalizes understanding.

## 2019-01-13 PROCEDURE — A9270 NON-COVERED ITEM OR SERVICE: HCPCS | Performed by: INTERNAL MEDICINE

## 2019-01-13 PROCEDURE — 700102 HCHG RX REV CODE 250 W/ 637 OVERRIDE(OP): Performed by: HOSPITALIST

## 2019-01-13 PROCEDURE — 700102 HCHG RX REV CODE 250 W/ 637 OVERRIDE(OP): Performed by: INTERNAL MEDICINE

## 2019-01-13 PROCEDURE — A9270 NON-COVERED ITEM OR SERVICE: HCPCS | Performed by: HOSPITALIST

## 2019-01-13 PROCEDURE — 700111 HCHG RX REV CODE 636 W/ 250 OVERRIDE (IP): Performed by: INTERNAL MEDICINE

## 2019-01-13 PROCEDURE — G0378 HOSPITAL OBSERVATION PER HR: HCPCS

## 2019-01-13 PROCEDURE — 99225 PR SUBSEQUENT OBSERVATION CARE,LEVEL II: CPT | Performed by: HOSPITALIST

## 2019-01-13 RX ADMIN — FLUDROCORTISONE ACETATE 0.3 MG: 0.1 TABLET ORAL at 05:11

## 2019-01-13 RX ADMIN — HYDRALAZINE HYDROCHLORIDE 10 MG: 20 INJECTION INTRAMUSCULAR; INTRAVENOUS at 08:43

## 2019-01-13 RX ADMIN — ASPIRIN 81 MG 81 MG: 81 TABLET ORAL at 05:08

## 2019-01-13 RX ADMIN — SODIUM CHLORIDE TAB 1 GM 1 G: 1 TAB at 12:25

## 2019-01-13 RX ADMIN — FLUOXETINE HYDROCHLORIDE 20 MG: 20 CAPSULE ORAL at 05:08

## 2019-01-13 RX ADMIN — METOCLOPRAMIDE HYDROCHLORIDE 10 MG: 10 TABLET ORAL at 05:09

## 2019-01-13 RX ADMIN — ACETAMINOPHEN 650 MG: 325 TABLET, FILM COATED ORAL at 17:09

## 2019-01-13 RX ADMIN — MIDODRINE HYDROCHLORIDE 10 MG: 5 TABLET ORAL at 05:08

## 2019-01-13 RX ADMIN — METOCLOPRAMIDE HYDROCHLORIDE 10 MG: 10 TABLET ORAL at 17:09

## 2019-01-13 RX ADMIN — PYRIDOSTIGMINE BROMIDE 90 MG: 60 TABLET ORAL at 17:09

## 2019-01-13 RX ADMIN — PYRIDOSTIGMINE BROMIDE 90 MG: 60 TABLET ORAL at 05:10

## 2019-01-13 RX ADMIN — Medication 400 MG: at 05:08

## 2019-01-13 RX ADMIN — SODIUM CHLORIDE TAB 1 GM 1 G: 1 TAB at 08:43

## 2019-01-13 RX ADMIN — NYSTATIN: 100000 POWDER TOPICAL at 17:11

## 2019-01-13 RX ADMIN — GUAIFENESIN 600 MG: 600 TABLET, EXTENDED RELEASE ORAL at 17:10

## 2019-01-13 RX ADMIN — PRAVASTATIN SODIUM 20 MG: 20 TABLET ORAL at 17:10

## 2019-01-13 RX ADMIN — SODIUM CHLORIDE TAB 1 GM 1 G: 1 TAB at 17:10

## 2019-01-13 RX ADMIN — NYSTATIN: 100000 POWDER TOPICAL at 05:09

## 2019-01-13 RX ADMIN — OXYCODONE HYDROCHLORIDE AND ACETAMINOPHEN 500 MG: 500 TABLET ORAL at 05:08

## 2019-01-13 RX ADMIN — PYRIDOSTIGMINE BROMIDE 90 MG: 60 TABLET ORAL at 12:25

## 2019-01-13 RX ADMIN — MULTIPLE VITAMINS W/ MINERALS TAB 1 TABLET: TAB at 05:08

## 2019-01-13 RX ADMIN — METOCLOPRAMIDE HYDROCHLORIDE 10 MG: 10 TABLET ORAL at 12:25

## 2019-01-13 RX ADMIN — GUAIFENESIN 600 MG: 600 TABLET, EXTENDED RELEASE ORAL at 05:08

## 2019-01-13 RX ADMIN — MIDODRINE HYDROCHLORIDE 10 MG: 5 TABLET ORAL at 12:25

## 2019-01-13 RX ADMIN — MIDODRINE HYDROCHLORIDE 10 MG: 5 TABLET ORAL at 17:10

## 2019-01-13 RX ADMIN — AMIODARONE HYDROCHLORIDE 50 MG: 200 TABLET ORAL at 05:09

## 2019-01-13 ASSESSMENT — ENCOUNTER SYMPTOMS
CHILLS: 0
SHORTNESS OF BREATH: 0
NAUSEA: 0
HEADACHES: 0
COUGH: 0
DIZZINESS: 0
ABDOMINAL PAIN: 0
FEVER: 0
NECK PAIN: 0
BLURRED VISION: 0
DOUBLE VISION: 0

## 2019-01-13 ASSESSMENT — PAIN SCALES - GENERAL
PAINLEVEL_OUTOF10: 0

## 2019-01-13 NOTE — PROGRESS NOTES
Report received from day RN. Assumed patient care at 1900. Patient is AAOx4. Patient appears calm and in no acute distress. Labs and orders reviewed. Assessment completed. Plan of care discussed with patient; questions have been answered at this time. Patient needs have been met at this time. Patient educated to call when needing assistance. Call light within reach. Bed alarm on. Non-Skid socks in place. Bed locked and in the lowest position. Hourly rounding in place. /51   Pulse 68   Temp 36.3 °C (97.4 °F) (Temporal)   Resp 19   Ht 1.829 m (6')   Wt 110.5 kg (243 lb 9.7 oz)   SpO2 94%   BMI 33.04 kg/m² .

## 2019-01-13 NOTE — CARE PLAN
Problem: Safety  Goal: Will remain free from falls    Intervention: Assess risk factors for falls  Environment is clutter free. Personal possession and bedside table near patient. Bed locked and in the lowest position. Patient educated to call when needing assistance. Call light within reach. Bed alarm on. Non-skids socks in place. Hourly rounding in place.       Problem: Discharge Barriers/Planning  Goal: Patient's continuum of care needs will be met    Intervention: Assess potential discharge barriers on admission and throughout hospital stay  Patient to go home with [facility] . Patient pending arrival of larry lift and bedside commode at patient home in order to ensure safe discharge for patient. Patient to follow up with [facility].

## 2019-01-13 NOTE — PROGRESS NOTES
Hospital Medicine Daily Progress Note    Date of Service  1/13/2019    Chief Complaint  53 y.o. male admitted 12/28/2018 with syncope.    Hospital Course    PMH CHD, DM, HTN, HLD, and recent hospitalization for afib s/p cardioversion who had difficult to control orthostatic hypotension at that time. He had right and left cardiac cath at that time and no cardiac cause of orthostatic hypotension was identified. He was discharged on florinef, salt tabs, midodrine.  He presented with syncope while walking and back pain. He was found with L1 compression fracture. Dr Pepper with NSG recommended TLSO and management for osteoporosis. Had had CT head and carotid doppler that was unremarkable. Recent TTE showed normal EF with mild MR and TR. MRI brain showed chronic changes. TSH was normal. He has orthostatic hypotension that's been difficult to correct despite multiple medications.   He has chronic leg swelling that has been worse since lasix stopped for hypotension. Doppler was negative for DVT bilaterally. He was noted to have right hand cellulitis and treated with clindamycin.  He continues to feel dizzy at times even when laying down. Suffered from a syncopal episode while sitting at edge of bed resulting in fall. Code blue was called as patient was unresponsive for several seconds. This followed by a psychogenic non epileptic seizure. Luckily patient did not suffer from any significant trauma from this fall. Upon discussion, patient's xarelto was discontinued as we both felt the risks of bleeding at this time outweigh the benefits due to his recurrent falls. Attempt was made to get droxidopa as an outpatient started in the hospital but this is a specialized medication which requires documents to be completed for initiation.  Recliner wheelchair, larry lift, and bedside commode has been arranged for patient and he understands he will likely need to stay in that position due to his debilitating orthostatic hypotension. He  will also need outpatient arrangement of some orthostatic hypotension specialist.        Interval Problem Update  Feeling well today coloring in bed  No complaints today and not feeling dizzy       Consultants/Specialty  None    Code Status  Full    Disposition  F/u [facility]  Home with HH. Pending arrival of larry lift at home to ensure safe discharge plan.      Review of Systems  Review of Systems   Constitutional: Negative for chills and fever.   HENT: Negative for congestion.    Eyes: Negative for blurred vision and double vision.   Respiratory: Negative for cough and shortness of breath (resolved).    Cardiovascular: Positive for leg swelling. Negative for chest pain.   Gastrointestinal: Negative for abdominal pain and nausea.   Musculoskeletal: Negative for neck pain.   Skin: Negative for rash.   Neurological: Negative for dizziness (absent today) and headaches.        Physical Exam  Temp:  [36.3 °C (97.4 °F)-36.7 °C (98.1 °F)] 36.5 °C (97.7 °F)  Pulse:  [54-68] 58  Resp:  [18-19] 18  BP: ()/(51-84) 171/84    Physical Exam   Constitutional: He is oriented to person, place, and time. He appears well-developed and well-nourished.   HENT:   Head: Normocephalic.   Mouth/Throat: Oropharynx is clear and moist.   Eyes: Conjunctivae are normal. Right eye exhibits no discharge. Left eye exhibits no discharge.   Cardiovascular: Normal rate and regular rhythm.    No murmur heard.  Pulmonary/Chest: Effort normal and breath sounds normal. No respiratory distress. He has no wheezes.   Abdominal: Soft. There is no tenderness.   Musculoskeletal: He exhibits edema (LE). He exhibits no tenderness.   Neurological: He is alert and oriented to person, place, and time.   Skin: Skin is warm and dry.   Nursing note and vitals reviewed.      Fluids    Intake/Output Summary (Last 24 hours) at 01/13/19 1203  Last data filed at 01/13/19 1000   Gross per 24 hour   Intake              290 ml   Output              400 ml   Net              -110 ml       Laboratory                        Imaging  DX-HIP-UNILATERAL-WITH PELVIS-1 VIEW RIGHT   Final Result      No radiographic evidence of acute traumatic injury right hip.      DX-CHEST-PORTABLE (1 VIEW)   Final Result      Hypoinflation without other evidence for acute cardiopulmonary disease.      MR-BRAIN-W/O   Final Result      1.  MRI of the brain without contrast within normal limits for age with mild white matter changes.   2.  Chronic appearing sinus disease   3.  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 This does not have an aggressive appearance.      US-EXTREMITY VENOUS LOWER BILAT   Final Result      CT-HAND WITH RIGHT   Final Result      1.  Edema in the dorsum of the forearm and hand without a discrete fluid collection to suggest abscess is identified.      US-CAROTID DOPPLER BILAT   Final Result      DX-LUMBAR SPINE-2 OR 3 VIEWS   Final Result         Stable compression deformity in the L1 vertebral body.      CT-CTA CHEST PULMONARY ARTERY W/ RECONS   Final Result         1. No CT evidence of pulmonary embolism.      2. Old right rib fractures.      3. Cholelithiasis. Enlarged spleen with granulomas.      4. Mild superior endplate compression deformity of L1 could be acute. Correlate with point tenderness. No retropulsion. No malalignment.      CT-CSPINE WITHOUT PLUS RECONS   Final Result         1. No acute fracture from C1 through T3 is visualized.         CT-HEAD W/O   Final Result         1. No acute intracranial abnormality. No evidence of acute intracranial hemorrhage or mass lesion.               DX-THORACIC SPINE-WITH SWIMMERS VIEW   Final Result      No acute thoracic spine fracture or subluxation identified.      DX-CHEST-PORTABLE (1 VIEW)   Final Result      No acute cardiac or pulmonary abnormality is noted.           Assessment/Plan  * Orthostatic hypotension- (present on admission)   Assessment & Plan    Possibly secondary  "to dysautonomia secondary to diabetes. Recently admitted for this issue for almost a mohan  Ongoing despite midodrine, florinef, mestinon, reglan, vit B12 and solumedrol, salt tabs, compression stockings  PTOT  Recliner wheelchair, larry, and bedside commode have been ordered  Attempt was made to work with SW to  outpatient prescription of droxidopa so we may start in house but it is unfortunately a very specialized medication. I discussed with Dr. Childress about neurology assistance with starting this medication but he recommended outpatient follow up  Ultimate plan is to arrange for a safe discharge with patient's current condition and severe symptoms which are not being controlled with above measures         Fall from ground level   Assessment & Plan    Fell while sitting at bedside on 1/9. See above     Lumbar compression fracture (HCC)   Assessment & Plan    Pain management as needed.    Neurosurgery recommended TLSO brace.   PT OT recommended home health.    F/u with [facility]     Psychogenic nonepileptic seizure- (present on admission)   Assessment & Plan    Pt had seizure like episode after he fell  Pt well known to me from prior admission. Has been evaluated by neuro and normal EEG even during \"episodes\"  Patient stops seizing while speaking with him  AVOID benzodiazepines     Vitamin B12 deficiency- (present on admission)   Assessment & Plan    Started intramuscular injections       Syncope- (present on admission)   Assessment & Plan    Due to orthostasis.    CT of the head unremarkable. MRI brain showed chronic changes  Carotid Doppler did not reveal significant stenosis  Recent TTE showed normal EF, mild TR and MR  1/9: syncopal episode while sitting at edge of bed. Pt advised to not sit up without assitance     Chronic diastolic heart failure (HCC)- (present on admission)   Assessment & Plan    Without current exacerbation.    Chronic lower extremity edema -chronic venous insufficiency     Acute " respiratory failure with hypoxia (HCC)   Assessment & Plan    With cough and congestion  CXR reviewed by me showing atelectesis  Start IS, mucinex. procalcitonin normal    resolved     Venous stasis dermatitis of both lower extremities- (present on admission)   Assessment & Plan    Holding lasix  Compression stockings     Bilateral leg edema- (present on admission)   Assessment & Plan    Appears to have chronic edematous changes.  Likely secondary to venous insufficiency.    Recent TTE showed intact EF  Lasix has been held for orthostatic hypotension  Ultrasound for DVT was study was done and it was negative for DVT  Compression stockings ordered     Paroxysmal A-fib (HCC)- (present on admission)   Assessment & Plan    Continue xarelto and amiodarone  Sinus rhythm  Patient's [id number] is 3 indicating 4.6% of stroke/tia/systemic embolisation per year. HASBLED score is 2 indicating 4.1% bleeding risk. Due to patient's significant orthostatic hypotension and recurrent falls the risks of bleeding outweigh the benefit of anticoagulation at this point of time.  I discussed this in detail with patient and he is agreeable.  Educated patient that if at some point in time his orthostatic hypotension and falls resolve/improve then at that time might be a good time to resume anticoagulation.  I also discussed with him that if he did want to resume anticoagulation that I would recommend warfarin as there is a reversal agent for this.  Started on aspirin        Essential hypertension- (present on admission)   Assessment & Plan    Lasix held for orthostatic hypotension  Midodrine will result in supine hypertension but this is necessary to try to optomize orthostatic hypotension     Mixed hyperlipidemia- (present on admission)   Assessment & Plan    Continue current therapy           VTE prophylaxis: SCDs

## 2019-01-13 NOTE — CARE PLAN
Problem: Safety  Goal: Will remain free from injury  Outcome: PROGRESSING AS EXPECTED  Pt educated on high fall risk level. Pt agreeable to not get OOB without assistance. Pt has nonskid socks on, bed alarm on, bed in lowest and locked position, and call light within reach.     Problem: Discharge Barriers/Planning  Goal: Patient's continuum of care needs will be met  Outcome: PROGRESSING AS EXPECTED  Awaiting for larry lift to be at pt's house for transfers. Pt updated on plan of care, agreeable and no questions at this time.

## 2019-01-13 NOTE — PROGRESS NOTES
Assumed care at 0700, report received from NOC RN.  Pt A&O x 4, states pain is 0/10. Wife at bedside. All questions answered at this time. Bed locked, 2 rails up, bed in lowest position. Call light in place, belongings at bedside, no needs at this time and hourly rounding in place. Due to high fall risk of pt, pt has nonskid socks on, bed alarm on, and pt instructed to call before getting OOB. Pt uses urinal at bedside.

## 2019-01-14 PROCEDURE — A9270 NON-COVERED ITEM OR SERVICE: HCPCS | Performed by: HOSPITALIST

## 2019-01-14 PROCEDURE — 99225 PR SUBSEQUENT OBSERVATION CARE,LEVEL II: CPT | Performed by: HOSPITALIST

## 2019-01-14 PROCEDURE — 700102 HCHG RX REV CODE 250 W/ 637 OVERRIDE(OP): Performed by: INTERNAL MEDICINE

## 2019-01-14 PROCEDURE — A9270 NON-COVERED ITEM OR SERVICE: HCPCS | Performed by: INTERNAL MEDICINE

## 2019-01-14 PROCEDURE — 700102 HCHG RX REV CODE 250 W/ 637 OVERRIDE(OP): Performed by: HOSPITALIST

## 2019-01-14 PROCEDURE — G0378 HOSPITAL OBSERVATION PER HR: HCPCS

## 2019-01-14 RX ADMIN — PYRIDOSTIGMINE BROMIDE 90 MG: 60 TABLET ORAL at 15:21

## 2019-01-14 RX ADMIN — MIDODRINE HYDROCHLORIDE 10 MG: 5 TABLET ORAL at 05:48

## 2019-01-14 RX ADMIN — METOCLOPRAMIDE HYDROCHLORIDE 10 MG: 10 TABLET ORAL at 12:55

## 2019-01-14 RX ADMIN — FLUOXETINE HYDROCHLORIDE 20 MG: 20 CAPSULE ORAL at 05:47

## 2019-01-14 RX ADMIN — ASPIRIN 81 MG 81 MG: 81 TABLET ORAL at 05:47

## 2019-01-14 RX ADMIN — MULTIPLE VITAMINS W/ MINERALS TAB 1 TABLET: TAB at 05:48

## 2019-01-14 RX ADMIN — FLUDROCORTISONE ACETATE 0.3 MG: 0.1 TABLET ORAL at 05:47

## 2019-01-14 RX ADMIN — Medication 400 MG: at 05:47

## 2019-01-14 RX ADMIN — PRAVASTATIN SODIUM 20 MG: 20 TABLET ORAL at 17:55

## 2019-01-14 RX ADMIN — METOCLOPRAMIDE HYDROCHLORIDE 10 MG: 10 TABLET ORAL at 05:47

## 2019-01-14 RX ADMIN — SODIUM CHLORIDE TAB 1 GM 1 G: 1 TAB at 07:31

## 2019-01-14 RX ADMIN — MIDODRINE HYDROCHLORIDE 10 MG: 5 TABLET ORAL at 12:54

## 2019-01-14 RX ADMIN — NYSTATIN: 100000 POWDER TOPICAL at 18:00

## 2019-01-14 RX ADMIN — MIDODRINE HYDROCHLORIDE 10 MG: 5 TABLET ORAL at 17:55

## 2019-01-14 RX ADMIN — PYRIDOSTIGMINE BROMIDE 90 MG: 60 TABLET ORAL at 05:47

## 2019-01-14 RX ADMIN — SODIUM CHLORIDE TAB 1 GM 1 G: 1 TAB at 12:55

## 2019-01-14 RX ADMIN — GUAIFENESIN 600 MG: 600 TABLET, EXTENDED RELEASE ORAL at 17:55

## 2019-01-14 RX ADMIN — METOCLOPRAMIDE HYDROCHLORIDE 10 MG: 10 TABLET ORAL at 17:55

## 2019-01-14 RX ADMIN — AMIODARONE HYDROCHLORIDE 50 MG: 200 TABLET ORAL at 05:47

## 2019-01-14 RX ADMIN — GUAIFENESIN 600 MG: 600 TABLET, EXTENDED RELEASE ORAL at 05:47

## 2019-01-14 RX ADMIN — NYSTATIN: 100000 POWDER TOPICAL at 05:47

## 2019-01-14 RX ADMIN — PYRIDOSTIGMINE BROMIDE 90 MG: 60 TABLET ORAL at 17:55

## 2019-01-14 RX ADMIN — OXYCODONE HYDROCHLORIDE AND ACETAMINOPHEN 500 MG: 500 TABLET ORAL at 05:48

## 2019-01-14 RX ADMIN — SODIUM CHLORIDE TAB 1 GM 1 G: 1 TAB at 17:55

## 2019-01-14 ASSESSMENT — ENCOUNTER SYMPTOMS
FEVER: 0
BLURRED VISION: 0
NECK PAIN: 0
HEADACHES: 0
DOUBLE VISION: 0
DIZZINESS: 0
SHORTNESS OF BREATH: 0
NAUSEA: 0
ABDOMINAL PAIN: 0
CHILLS: 0
COUGH: 0

## 2019-01-14 ASSESSMENT — PAIN SCALES - GENERAL
PAINLEVEL_OUTOF10: 0

## 2019-01-14 NOTE — CARE PLAN
Problem: Safety  Goal: Will remain free from injury  Outcome: PROGRESSING AS EXPECTED  Pt calls appropriatley before getting OOB. Pt up SBA with FWW. Bed alarm on, nonskid socks on, and bed in lowest and locked position.     Problem: Mobility  Goal: Risk for activity intolerance will decrease  Outcome: PROGRESSING AS EXPECTED  Pt up to bathroom, and sitting up in bed. Pt tolerating activity more. Pt calling appropriately as well.

## 2019-01-14 NOTE — DISCHARGE PLANNING
Agency/Facility Name: [facility]   Spoke To: Hedy  Outcome: Order pending contact with patient's wife. ALY gave Hedy patient's wife cell phone and house number to schedule delivery. JAMES Oropeza notified.

## 2019-01-14 NOTE — PROGRESS NOTES
Hospital Medicine Daily Progress Note    Date of Service  1/14/2019    Chief Complaint  53 y.o. male admitted 12/28/2018 with syncope.    Hospital Course    PMH CHD, DM, HTN, HLD, and recent hospitalization for afib s/p cardioversion who had difficult to control orthostatic hypotension at that time. He had right and left cardiac cath at that time and no cardiac cause of orthostatic hypotension was identified. He was discharged on florinef, salt tabs, midodrine.  He presented with syncope while walking and back pain. He was found with L1 compression fracture. Dr Pepper with NSG recommended TLSO and management for osteoporosis. Had had CT head and carotid doppler that was unremarkable. Recent TTE showed normal EF with mild MR and TR. MRI brain showed chronic changes. TSH was normal. He has orthostatic hypotension that's been difficult to correct despite multiple medications.   He has chronic leg swelling that has been worse since lasix stopped for hypotension. Doppler was negative for DVT bilaterally. He was noted to have right hand cellulitis and treated with clindamycin.  He continues to feel dizzy at times even when laying down. Suffered from a syncopal episode while sitting at edge of bed resulting in fall. Code blue was called as patient was unresponsive for several seconds. This followed by a psychogenic non epileptic seizure. Luckily patient did not suffer from any significant trauma from this fall. Upon discussion, patient's xarelto was discontinued as we both felt the risks of bleeding at this time outweigh the benefits due to his recurrent falls. Attempt was made to get droxidopa as an outpatient started in the hospital but this is a specialized medication which requires documents to be completed for initiation.  Recliner wheelchair, larry lift, and bedside commode has been arranged for patient and he understands he will likely need to stay in that position due to his debilitating orthostatic hypotension. He  will also need outpatient arrangement of some orthostatic hypotension specialist.        Interval Problem Update  Pending insurance auth for larry   Otherwise doing well today without any complaints       Consultants/Specialty  None    Code Status  Full    Disposition  F/u [facility]  Home with HH. Pending arrival of larry lift at home to ensure safe discharge plan.      Review of Systems  Review of Systems   Constitutional: Negative for chills and fever.   HENT: Negative for congestion.    Eyes: Negative for blurred vision and double vision.   Respiratory: Negative for cough and shortness of breath.    Cardiovascular: Positive for leg swelling. Negative for chest pain.   Gastrointestinal: Negative for abdominal pain and nausea.   Musculoskeletal: Negative for neck pain.   Skin: Negative for rash.   Neurological: Negative for dizziness (absent today) and headaches.        Physical Exam  Temp:  [35.9 °C (96.7 °F)-36.9 °C (98.4 °F)] 36.6 °C (97.9 °F)  Pulse:  [58-67] 58  Resp:  [17-18] 17  BP: (112-153)/(48-77) 153/77    Physical Exam   Constitutional: He is oriented to person, place, and time. He appears well-developed and well-nourished.   HENT:   Head: Normocephalic.   Mouth/Throat: Oropharynx is clear and moist.   Eyes: Conjunctivae are normal. Right eye exhibits no discharge. Left eye exhibits no discharge.   Cardiovascular: Normal rate and regular rhythm.    No murmur heard.  Pulmonary/Chest: Effort normal and breath sounds normal. No respiratory distress. He has no wheezes.   Abdominal: Soft. There is no tenderness.   Musculoskeletal: He exhibits edema (LE). He exhibits no tenderness.   Neurological: He is alert and oriented to person, place, and time.   Skin: Skin is warm and dry.   Nursing note and vitals reviewed.      Fluids    Intake/Output Summary (Last 24 hours) at 01/14/19 0948  Last data filed at 01/14/19 0700   Gross per 24 hour   Intake              290 ml   Output             1250 ml   Net              -960 ml       Laboratory                        Imaging  DX-HIP-UNILATERAL-WITH PELVIS-1 VIEW RIGHT   Final Result      No radiographic evidence of acute traumatic injury right hip.      DX-CHEST-PORTABLE (1 VIEW)   Final Result      Hypoinflation without other evidence for acute cardiopulmonary disease.      MR-BRAIN-W/O   Final Result      1.  MRI of the brain without contrast within normal limits for age with mild white matter changes.   2.  Chronic appearing sinus disease   3.  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 This does not have an aggressive appearance.      US-EXTREMITY VENOUS LOWER BILAT   Final Result      CT-HAND WITH RIGHT   Final Result      1.  Edema in the dorsum of the forearm and hand without a discrete fluid collection to suggest abscess is identified.      US-CAROTID DOPPLER BILAT   Final Result      DX-LUMBAR SPINE-2 OR 3 VIEWS   Final Result         Stable compression deformity in the L1 vertebral body.      CT-CTA CHEST PULMONARY ARTERY W/ RECONS   Final Result         1. No CT evidence of pulmonary embolism.      2. Old right rib fractures.      3. Cholelithiasis. Enlarged spleen with granulomas.      4. Mild superior endplate compression deformity of L1 could be acute. Correlate with point tenderness. No retropulsion. No malalignment.      CT-CSPINE WITHOUT PLUS RECONS   Final Result         1. No acute fracture from C1 through T3 is visualized.         CT-HEAD W/O   Final Result         1. No acute intracranial abnormality. No evidence of acute intracranial hemorrhage or mass lesion.               DX-THORACIC SPINE-WITH SWIMMERS VIEW   Final Result      No acute thoracic spine fracture or subluxation identified.      DX-CHEST-PORTABLE (1 VIEW)   Final Result      No acute cardiac or pulmonary abnormality is noted.           Assessment/Plan  * Orthostatic hypotension- (present on admission)   Assessment & Plan    Possibly secondary to  "dysautonomia secondary to diabetes. Recently admitted for this issue for almost a mohan  Ongoing despite midodrine, florinef, mestinon, reglan, vit B12 and solumedrol, salt tabs, compression stockings  PTOT  Recliner wheelchair, larry, and bedside commode have been ordered  Attempt was made to work with SW to  outpatient prescription of droxidopa so we may start in house but it is unfortunately a very specialized medication. I discussed with Dr. Childress about neurology assistance with starting this medication but he recommended outpatient follow up  Ultimate plan is to arrange for a safe discharge with patient's current condition and severe symptoms which are not being controlled with above measures         Fall from ground level   Assessment & Plan    Fell while sitting at bedside on 1/9. See above     Lumbar compression fracture (HCC)   Assessment & Plan    Pain management as needed.    Neurosurgery recommended TLSO brace.   PT OT recommended home health.    F/u with [facility]     Psychogenic nonepileptic seizure- (present on admission)   Assessment & Plan    Pt had seizure like episode after he fell  Pt well known to me from prior admission. Has been evaluated by neuro and normal EEG even during \"episodes\"  Patient stops seizing while speaking with him  AVOID benzodiazepines     Vitamin B12 deficiency- (present on admission)   Assessment & Plan    Started intramuscular injections       Syncope- (present on admission)   Assessment & Plan    Due to orthostasis.    CT of the head unremarkable. MRI brain showed chronic changes  Carotid Doppler did not reveal significant stenosis  Recent TTE showed normal EF, mild TR and MR  1/9: syncopal episode while sitting at edge of bed. Pt advised to not sit up without assitance     Chronic diastolic heart failure (HCC)- (present on admission)   Assessment & Plan    Without current exacerbation.    Chronic lower extremity edema -chronic venous insufficiency     Acute " respiratory failure with hypoxia (HCC)   Assessment & Plan    With cough and congestion  CXR reviewed by me showing atelectesis  Start IS, mucinex. procalcitonin normal    resolved     Venous stasis dermatitis of both lower extremities- (present on admission)   Assessment & Plan    Holding lasix  Compression stockings     Bilateral leg edema- (present on admission)   Assessment & Plan    Appears to have chronic edematous changes.  Likely secondary to venous insufficiency.    Recent TTE showed intact EF  Lasix has been held for orthostatic hypotension  Ultrasound for DVT was study was done and it was negative for DVT  Compression stockings ordered     Paroxysmal A-fib (HCC)- (present on admission)   Assessment & Plan    Continue xarelto and amiodarone  Sinus rhythm  Patient's [id number] is 3 indicating 4.6% of stroke/tia/systemic embolisation per year. HASBLED score is 2 indicating 4.1% bleeding risk. Due to patient's significant orthostatic hypotension and recurrent falls the risks of bleeding outweigh the benefit of anticoagulation at this point of time.  I discussed this in detail with patient and he is agreeable.  Educated patient that if at some point in time his orthostatic hypotension and falls resolve/improve then at that time might be a good time to resume anticoagulation.  I also discussed with him that if he did want to resume anticoagulation that I would recommend warfarin as there is a reversal agent for this.  Started on aspirin        Essential hypertension- (present on admission)   Assessment & Plan    Lasix held for orthostatic hypotension  Midodrine will result in supine hypertension but this is necessary to try to optomize orthostatic hypotension     Mixed hyperlipidemia- (present on admission)   Assessment & Plan    Continue current therapy           VTE prophylaxis: SCDs

## 2019-01-14 NOTE — CARE PLAN
Problem: Safety  Goal: Will remain free from injury  Outcome: PROGRESSING AS EXPECTED  Bed in low position, bed alarm on, call light in reach.

## 2019-01-14 NOTE — PROGRESS NOTES
Assumed care at 0700, report received from NOC RN.  Pt A&O x 4, states pain is 0/10. Bed locked, 2 rails up, bed in lowest position. Call light in place, belongings at bedside, no needs at this time and hourly rounding in place.  Pt updated on plan of care-- pt awaiting DME equipment to be delivered to house.

## 2019-01-14 NOTE — PROGRESS NOTES
Report received. Pt lying in bed, eyes closed, no s/s distress noted, bed in low position, bed alarm on, call light in reach.

## 2019-01-14 NOTE — DISCHARGE PLANNING
Anticipated Discharge Disposition: Home with Home Health, W/C, Travis lift    Action: LSW met with pt at bedside and informed him that discharge is pending scheduled delivery of travis lift.     Barriers to Discharge: none    Plan: Await scheduled delivery of travis lift

## 2019-01-15 PROCEDURE — 700102 HCHG RX REV CODE 250 W/ 637 OVERRIDE(OP): Performed by: INTERNAL MEDICINE

## 2019-01-15 PROCEDURE — G0378 HOSPITAL OBSERVATION PER HR: HCPCS

## 2019-01-15 PROCEDURE — A9270 NON-COVERED ITEM OR SERVICE: HCPCS | Performed by: INTERNAL MEDICINE

## 2019-01-15 PROCEDURE — A9270 NON-COVERED ITEM OR SERVICE: HCPCS | Performed by: HOSPITALIST

## 2019-01-15 PROCEDURE — 99224 PR SUBSEQUENT OBSERVATION CARE,LEVEL I: CPT | Performed by: INTERNAL MEDICINE

## 2019-01-15 PROCEDURE — 700102 HCHG RX REV CODE 250 W/ 637 OVERRIDE(OP): Performed by: HOSPITALIST

## 2019-01-15 RX ADMIN — METOCLOPRAMIDE HYDROCHLORIDE 10 MG: 10 TABLET ORAL at 08:17

## 2019-01-15 RX ADMIN — FLUDROCORTISONE ACETATE 0.3 MG: 0.1 TABLET ORAL at 05:14

## 2019-01-15 RX ADMIN — MIDODRINE HYDROCHLORIDE 10 MG: 5 TABLET ORAL at 11:59

## 2019-01-15 RX ADMIN — MIDODRINE HYDROCHLORIDE 10 MG: 5 TABLET ORAL at 05:13

## 2019-01-15 RX ADMIN — OXYCODONE HYDROCHLORIDE AND ACETAMINOPHEN 500 MG: 500 TABLET ORAL at 05:13

## 2019-01-15 RX ADMIN — PRAVASTATIN SODIUM 20 MG: 20 TABLET ORAL at 17:21

## 2019-01-15 RX ADMIN — AMIODARONE HYDROCHLORIDE 50 MG: 200 TABLET ORAL at 05:14

## 2019-01-15 RX ADMIN — NYSTATIN: 100000 POWDER TOPICAL at 17:21

## 2019-01-15 RX ADMIN — FLUOXETINE HYDROCHLORIDE 20 MG: 20 CAPSULE ORAL at 05:14

## 2019-01-15 RX ADMIN — Medication 400 MG: at 05:13

## 2019-01-15 RX ADMIN — METOCLOPRAMIDE HYDROCHLORIDE 10 MG: 10 TABLET ORAL at 17:21

## 2019-01-15 RX ADMIN — SODIUM CHLORIDE TAB 1 GM 1 G: 1 TAB at 11:59

## 2019-01-15 RX ADMIN — MIDODRINE HYDROCHLORIDE 10 MG: 5 TABLET ORAL at 17:21

## 2019-01-15 RX ADMIN — PYRIDOSTIGMINE BROMIDE 90 MG: 60 TABLET ORAL at 17:28

## 2019-01-15 RX ADMIN — METOCLOPRAMIDE HYDROCHLORIDE 10 MG: 10 TABLET ORAL at 11:59

## 2019-01-15 RX ADMIN — ASPIRIN 81 MG 81 MG: 81 TABLET ORAL at 05:13

## 2019-01-15 RX ADMIN — SODIUM CHLORIDE TAB 1 GM 1 G: 1 TAB at 08:17

## 2019-01-15 RX ADMIN — PYRIDOSTIGMINE BROMIDE 90 MG: 60 TABLET ORAL at 11:59

## 2019-01-15 RX ADMIN — ACETAMINOPHEN 650 MG: 325 TABLET, FILM COATED ORAL at 08:17

## 2019-01-15 RX ADMIN — PYRIDOSTIGMINE BROMIDE 90 MG: 60 TABLET ORAL at 05:14

## 2019-01-15 RX ADMIN — GUAIFENESIN 600 MG: 600 TABLET, EXTENDED RELEASE ORAL at 05:13

## 2019-01-15 RX ADMIN — SODIUM CHLORIDE TAB 1 GM 1 G: 1 TAB at 17:21

## 2019-01-15 RX ADMIN — MULTIPLE VITAMINS W/ MINERALS TAB 1 TABLET: TAB at 05:13

## 2019-01-15 RX ADMIN — GUAIFENESIN 600 MG: 600 TABLET, EXTENDED RELEASE ORAL at 17:21

## 2019-01-15 RX ADMIN — NYSTATIN: 100000 POWDER TOPICAL at 05:14

## 2019-01-15 ASSESSMENT — ENCOUNTER SYMPTOMS
HEADACHES: 0
DIZZINESS: 0
CHILLS: 0
NAUSEA: 0
ABDOMINAL PAIN: 0
FEVER: 0
DOUBLE VISION: 0
SHORTNESS OF BREATH: 0
COUGH: 0
BLURRED VISION: 0
NECK PAIN: 0

## 2019-01-15 ASSESSMENT — PAIN SCALES - GENERAL
PAINLEVEL_OUTOF10: 0
PAINLEVEL_OUTOF10: 5
PAINLEVEL_OUTOF10: 4

## 2019-01-15 NOTE — PROGRESS NOTES
Hospital Medicine Daily Progress Note    Date of Service  1/15/2019    Chief Complaint  53 y.o. male admitted 12/28/2018 with syncope.    Hospital Course    PMH CHD, DM, HTN, HLD, and recent hospitalization for afib s/p cardioversion who had difficult to control orthostatic hypotension at that time. He had right and left cardiac cath at that time and no cardiac cause of orthostatic hypotension was identified. He was discharged on florinef, salt tabs, midodrine.  He presented with syncope while walking and back pain. He was found with L1 compression fracture. Dr Pepper with NSG recommended TLSO and management for osteoporosis. Had had CT head and carotid doppler that was unremarkable. Recent TTE showed normal EF with mild MR and TR. MRI brain showed chronic changes. TSH was normal. He has orthostatic hypotension that's been difficult to correct despite multiple medications.   He has chronic leg swelling that has been worse since lasix stopped for hypotension. Doppler was negative for DVT bilaterally. He was noted to have right hand cellulitis and treated with clindamycin.  He continues to feel dizzy at times even when laying down. Suffered from a syncopal episode while sitting at edge of bed resulting in fall. Code blue was called as patient was unresponsive for several seconds. This followed by a psychogenic non epileptic seizure. Luckily patient did not suffer from any significant trauma from this fall. Upon discussion, patient's xarelto was discontinued as we both felt the risks of bleeding at this time outweigh the benefits due to his recurrent falls. Attempt was made to get droxidopa as an outpatient started in the hospital but this is a specialized medication which requires documents to be completed for initiation.  Recliner wheelchair, larry lift, and bedside commode has been arranged for patient and he understands he will likely need to stay in that position due to his debilitating orthostatic hypotension. He  will also need outpatient arrangement of some orthostatic hypotension specialist.        Interval Problem Update  Pt seen and examined, afebrile, no acute events overnight, no acute complains at this time.   Pending insurance auth for larry         Consultants/Specialty  None    Code Status  Full    Disposition  F/u [facility]  Home with HH. Pending arrival of larry lift at home to ensure safe discharge plan.      Review of Systems  Review of Systems   Constitutional: Negative for chills and fever.   HENT: Negative for congestion.    Eyes: Negative for blurred vision and double vision.   Respiratory: Negative for cough and shortness of breath.    Cardiovascular: Positive for leg swelling. Negative for chest pain.   Gastrointestinal: Negative for abdominal pain and nausea.   Genitourinary: Negative for dysuria and urgency.   Musculoskeletal: Negative for neck pain.   Skin: Negative for rash.   Neurological: Negative for dizziness (absent today) and headaches.        Physical Exam  Temp:  [36 °C (96.8 °F)-37.2 °C (99 °F)] 36.5 °C (97.7 °F)  Pulse:  [52-64] 56  Resp:  [17] 17  BP: (115-171)/(52-79) 171/79  SpO2:  [94 %-95 %] 95 %    Physical Exam   Constitutional: He is oriented to person, place, and time. He appears well-developed and well-nourished.   HENT:   Head: Normocephalic.   Mouth/Throat: Oropharynx is clear and moist.   Eyes: Conjunctivae are normal. Right eye exhibits no discharge. Left eye exhibits no discharge.   Neck: Neck supple. No JVD present.   Cardiovascular: Normal rate and regular rhythm.    No murmur heard.  Pulmonary/Chest: Effort normal and breath sounds normal. No respiratory distress. He has no wheezes.   Abdominal: Soft. He exhibits no distension. There is no tenderness.   Musculoskeletal: He exhibits edema (LE). He exhibits no tenderness.   Neurological: He is alert and oriented to person, place, and time.   Skin: Skin is warm and dry.   Nursing note and vitals  reviewed.      Fluids    Intake/Output Summary (Last 24 hours) at 01/15/19 1344  Last data filed at 01/15/19 0925   Gross per 24 hour   Intake              760 ml   Output             1070 ml   Net             -310 ml       Laboratory                        Imaging  DX-HIP-UNILATERAL-WITH PELVIS-1 VIEW RIGHT   Final Result      No radiographic evidence of acute traumatic injury right hip.      DX-CHEST-PORTABLE (1 VIEW)   Final Result      Hypoinflation without other evidence for acute cardiopulmonary disease.      MR-BRAIN-W/O   Final Result      1.  MRI of the brain without contrast within normal limits for age with mild white matter changes.   2.  Chronic appearing sinus disease   3.  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 This does not have an aggressive appearance.      US-EXTREMITY VENOUS LOWER BILAT   Final Result      CT-HAND WITH RIGHT   Final Result      1.  Edema in the dorsum of the forearm and hand without a discrete fluid collection to suggest abscess is identified.      US-CAROTID DOPPLER BILAT   Final Result      DX-LUMBAR SPINE-2 OR 3 VIEWS   Final Result         Stable compression deformity in the L1 vertebral body.      CT-CTA CHEST PULMONARY ARTERY W/ RECONS   Final Result         1. No CT evidence of pulmonary embolism.      2. Old right rib fractures.      3. Cholelithiasis. Enlarged spleen with granulomas.      4. Mild superior endplate compression deformity of L1 could be acute. Correlate with point tenderness. No retropulsion. No malalignment.      CT-CSPINE WITHOUT PLUS RECONS   Final Result         1. No acute fracture from C1 through T3 is visualized.         CT-HEAD W/O   Final Result         1. No acute intracranial abnormality. No evidence of acute intracranial hemorrhage or mass lesion.               DX-THORACIC SPINE-WITH SWIMMERS VIEW   Final Result      No acute thoracic spine fracture or subluxation identified.     "  DX-CHEST-PORTABLE (1 VIEW)   Final Result      No acute cardiac or pulmonary abnormality is noted.           Assessment/Plan  * Orthostatic hypotension- (present on admission)   Assessment & Plan    Possibly secondary to dysautonomia secondary to diabetes. Recently admitted for this issue for almost a mohan  Ongoing despite midodrine, florinef, mestinon, reglan, vit B12 and solumedrol, salt tabs, compression stockings  PTOT  Recliner wheelchair, larry, and bedside commode have been ordered  Attempt was made to work with SW to  outpatient prescription of droxidopa so we may start in house but it is unfortunately a very specialized medication. I discussed with Dr. Childress about neurology assistance with starting this medication but he recommended outpatient follow up  Ultimate plan is to arrange for a safe discharge with patient's current condition and severe symptoms which are not being controlled with above measures         Fall from ground level   Assessment & Plan    Fell while sitting at bedside on 1/9. See above     Lumbar compression fracture (HCC)   Assessment & Plan    Pain management as needed.    Neurosurgery recommended TLSO brace.   PT OT recommended home health.    F/u with [facility]     Psychogenic nonepileptic seizure- (present on admission)   Assessment & Plan    Pt had seizure like episode after he fell  Pt well known to me from prior admission. Has been evaluated by neuro and normal EEG even during \"episodes\"  Patient stops seizing while speaking with him  AVOID benzodiazepines     Vitamin B12 deficiency- (present on admission)   Assessment & Plan    Started intramuscular injections       Syncope- (present on admission)   Assessment & Plan    Due to orthostasis.    CT of the head unremarkable. MRI brain showed chronic changes  Carotid Doppler did not reveal significant stenosis  Recent TTE showed normal EF, mild TR and MR  1/9: syncopal episode while sitting at edge of bed.   Pt has been  " advised to not sit up without assistance     Chronic diastolic heart failure (HCC)- (present on admission)   Assessment & Plan    Without current exacerbation.    Chronic lower extremity edema -chronic venous insufficiency     Acute respiratory failure with hypoxia (HCC)   Assessment & Plan    resolved     Venous stasis dermatitis of both lower extremities- (present on admission)   Assessment & Plan    Holding lasix  Compression stockings     Bilateral leg edema- (present on admission)   Assessment & Plan    Appears to have chronic edematous changes.  Likely secondary to venous insufficiency.    Recent TTE showed intact EF  Lasix has been held for orthostatic hypotension  Ultrasound for DVT was study was done and it was negative for DVT  Compression stockings ordered     Paroxysmal A-fib (HCC)- (present on admission)   Assessment & Plan    Continue xarelto and amiodarone  Sinus rhythm  Patient's [id number] is 3 indicating 4.6% of stroke/tia/systemic embolisation per year. HASBLED score is 2 indicating 4.1% bleeding risk. Due to patient's significant orthostatic hypotension and recurrent falls the risks of bleeding outweigh the benefit of anticoagulation at this point of time.  I discussed this in detail with patient and he is agreeable.  Educated patient that if at some point in time his orthostatic hypotension and falls resolve/improve then at that time might be a good time to resume anticoagulation.  I also discussed with him that if he did want to resume anticoagulation that I would recommend warfarin as there is a reversal agent for this.   on aspirin        Essential hypertension- (present on admission)   Assessment & Plan    Lasix held for orthostatic hypotension  Midodrine will result in supine hypertension but this is necessary to try to optomize orthostatic hypotension     Mixed hyperlipidemia- (present on admission)   Assessment & Plan    Continue current therapy           VTE prophylaxis: SCDs

## 2019-01-15 NOTE — PROGRESS NOTES
"Multiple telephone messages left for Angelika patient's s/o regarding the need for her to call \"[facility]\" to set up delivery time of larry lift. Patient also aware of this needing to be arranged prior to discharge. Patient verbalized to this RN that he too will attempt to get a hold of s/o to make her aware of the fact that she needs to set up larry delivery time with company in order for home health to also be arranged do that staff os present for education on larry lift use.   "

## 2019-01-15 NOTE — PROGRESS NOTES
08:15 MD notified of patients resting bp of 171/79 this am. Will perform q4h vitals. No new orders received, will continue current medication regimen a patient is positive for orthostatic hypotension.

## 2019-01-15 NOTE — CARE PLAN
Problem: Discharge Barriers/Planning  Goal: Patient's continuum of care needs will be met    Intervention: Assess potential discharge barriers on admission and throughout hospital stay  Patient awaiting [facility]-larry lift delivery to home prior to discharge.       Problem: Respiratory:  Goal: Respiratory status will improve    Intervention: Educate and encourage coughing and deep breathing  Patient educated/encouraged to perform deep breathing and coughing exercises throughout day. Pt verbalizes understanding/rationale.

## 2019-01-15 NOTE — PROGRESS NOTES
Assumed patient care at 0700. Received report from night shift. Assessment completed. A&Ox 4. Denies pain at this time. No s/s of distress. High fall risk,  precautions in place; alarm on, treaded socks on pt, personal possessions and call light placed within reach. POC discussed with pt, discharge pending medical equipment delivery to home. Communication board updated. Patient denies any additional needs at this time.

## 2019-01-15 NOTE — CARE PLAN
Problem: Communication  Goal: The ability to communicate needs accurately and effectively will improve  Outcome: PROGRESSING AS EXPECTED  Plan of care reviewed with patient at beginning of shift    Problem: Infection  Goal: Will remain free from infection  Outcome: PROGRESSING AS EXPECTED  Hand hygiene encouraged

## 2019-01-16 PROCEDURE — A9270 NON-COVERED ITEM OR SERVICE: HCPCS | Performed by: HOSPITALIST

## 2019-01-16 PROCEDURE — 700102 HCHG RX REV CODE 250 W/ 637 OVERRIDE(OP): Performed by: HOSPITALIST

## 2019-01-16 PROCEDURE — G0378 HOSPITAL OBSERVATION PER HR: HCPCS

## 2019-01-16 PROCEDURE — A9270 NON-COVERED ITEM OR SERVICE: HCPCS | Performed by: INTERNAL MEDICINE

## 2019-01-16 PROCEDURE — 97535 SELF CARE MNGMENT TRAINING: CPT | Mod: XU

## 2019-01-16 PROCEDURE — 700102 HCHG RX REV CODE 250 W/ 637 OVERRIDE(OP): Performed by: INTERNAL MEDICINE

## 2019-01-16 PROCEDURE — 97530 THERAPEUTIC ACTIVITIES: CPT

## 2019-01-16 PROCEDURE — 99224 PR SUBSEQUENT OBSERVATION CARE,LEVEL I: CPT | Performed by: INTERNAL MEDICINE

## 2019-01-16 RX ADMIN — NYSTATIN: 100000 POWDER TOPICAL at 17:14

## 2019-01-16 RX ADMIN — OXYCODONE HYDROCHLORIDE AND ACETAMINOPHEN 500 MG: 500 TABLET ORAL at 05:46

## 2019-01-16 RX ADMIN — METOCLOPRAMIDE HYDROCHLORIDE 10 MG: 10 TABLET ORAL at 05:45

## 2019-01-16 RX ADMIN — FLUDROCORTISONE ACETATE 0.3 MG: 0.1 TABLET ORAL at 05:44

## 2019-01-16 RX ADMIN — PRAVASTATIN SODIUM 20 MG: 20 TABLET ORAL at 17:13

## 2019-01-16 RX ADMIN — MIDODRINE HYDROCHLORIDE 10 MG: 5 TABLET ORAL at 17:13

## 2019-01-16 RX ADMIN — MULTIPLE VITAMINS W/ MINERALS TAB 1 TABLET: TAB at 05:45

## 2019-01-16 RX ADMIN — MIDODRINE HYDROCHLORIDE 10 MG: 5 TABLET ORAL at 12:01

## 2019-01-16 RX ADMIN — SODIUM CHLORIDE TAB 1 GM 1 G: 1 TAB at 12:01

## 2019-01-16 RX ADMIN — METOCLOPRAMIDE HYDROCHLORIDE 10 MG: 10 TABLET ORAL at 17:14

## 2019-01-16 RX ADMIN — PYRIDOSTIGMINE BROMIDE 90 MG: 60 TABLET ORAL at 12:01

## 2019-01-16 RX ADMIN — NYSTATIN: 100000 POWDER TOPICAL at 05:44

## 2019-01-16 RX ADMIN — GUAIFENESIN 600 MG: 600 TABLET, EXTENDED RELEASE ORAL at 17:13

## 2019-01-16 RX ADMIN — PYRIDOSTIGMINE BROMIDE 90 MG: 60 TABLET ORAL at 05:43

## 2019-01-16 RX ADMIN — METOCLOPRAMIDE HYDROCHLORIDE 10 MG: 10 TABLET ORAL at 12:00

## 2019-01-16 RX ADMIN — AMIODARONE HYDROCHLORIDE 50 MG: 200 TABLET ORAL at 05:44

## 2019-01-16 RX ADMIN — GUAIFENESIN 600 MG: 600 TABLET, EXTENDED RELEASE ORAL at 05:45

## 2019-01-16 RX ADMIN — MIDODRINE HYDROCHLORIDE 10 MG: 5 TABLET ORAL at 05:55

## 2019-01-16 RX ADMIN — FLUOXETINE HYDROCHLORIDE 20 MG: 20 CAPSULE ORAL at 05:45

## 2019-01-16 RX ADMIN — Medication 400 MG: at 05:44

## 2019-01-16 RX ADMIN — SODIUM CHLORIDE TAB 1 GM 1 G: 1 TAB at 17:13

## 2019-01-16 RX ADMIN — ASPIRIN 81 MG 81 MG: 81 TABLET ORAL at 05:46

## 2019-01-16 RX ADMIN — SODIUM CHLORIDE TAB 1 GM 1 G: 1 TAB at 08:26

## 2019-01-16 RX ADMIN — PYRIDOSTIGMINE BROMIDE 90 MG: 60 TABLET ORAL at 17:13

## 2019-01-16 ASSESSMENT — PAIN SCALES - GENERAL
PAINLEVEL_OUTOF10: 7
PAINLEVEL_OUTOF10: 8
PAINLEVEL_OUTOF10: 7
PAINLEVEL_OUTOF10: 5
PAINLEVEL_OUTOF10: 7
PAINLEVEL_OUTOF10: 7

## 2019-01-16 ASSESSMENT — ENCOUNTER SYMPTOMS
ABDOMINAL PAIN: 0
SHORTNESS OF BREATH: 0
NECK PAIN: 0
FEVER: 0
DOUBLE VISION: 0
DIZZINESS: 0
CHILLS: 0
BLURRED VISION: 0
NAUSEA: 0
HEADACHES: 0
COUGH: 0

## 2019-01-16 ASSESSMENT — COGNITIVE AND FUNCTIONAL STATUS - GENERAL
DAILY ACTIVITIY SCORE: 21
TOILETING: A LITTLE
DRESSING REGULAR LOWER BODY CLOTHING: A LITTLE
SUGGESTED CMS G CODE MODIFIER DAILY ACTIVITY: CJ
HELP NEEDED FOR BATHING: A LITTLE

## 2019-01-16 NOTE — PROGRESS NOTES
Assumed patient care at 0700. Received report from night shift. Assessment completed. A&Ox 4. Patient complained of 8 out of 10 lower back pain; non-pharmacological forms of pain management implemented including repositioning and extra pillows in place to help increase patient comfort. No s/s of distress. High fall risk,  precautions in place; alarm on, treaded socks on pt, personal possessions and call light placed within reach. Communication board updated. Patient denies any additional needs at this time.

## 2019-01-16 NOTE — DISCHARGE PLANNING
LSW informed by MD that pt stated he was going to the shelter when he discharges. LSW met with pt at bedside to discuss. Pt stated that he was not going to stay with [facility] as anticipated and wants to discharge to the shelter. LSW informed pt that to go to the shelter he would have to be completely independent and in his current medical state, he will not be able to discharge to shelter. Pt stated that he does not have any income, he applied for disability in September but has not heard anything since. LSW to discuss with medical team for details on his current condition and escalate to Leadership.    LSW requested [facility] to cancel Travis Lift order.    Update: LSW discussed pt's current condition with bedside RN and MD. LSW discussed with leader, Laura, for additional resources. Referral sent to [facility] to see if pt qualifies for medical temporary housing.

## 2019-01-16 NOTE — DISCHARGE PLANNING
Agency/Facility Name: [facility]   Spoke To: Hedy   Outcome: DME order for Travis Lift cancelled. LSW Johnna notified.

## 2019-01-16 NOTE — CARE PLAN
Problem: Venous Thromboembolism (VTW)/Deep Vein Thrombosis (DVT) Prevention:  Goal: Patient will participate in Venous Thrombosis (VTE)/Deep Vein Thrombosis (DVT)Prevention Measures    Intervention: Encourage patient to perform ankle flex, foot rotation, and knee flex exercises in addition to other prophylatic measures every hour while awake  Patient educated on range of motion exercises. Encouraged to perform ankle and knee flexion exercises while in bed. Pt verbalizes understanding.       Problem: Discharge Barriers/Planning  Goal: Patient's continuum of care needs will be met    Intervention: Collaborate with Transitional Care Team and Interdisciplinary Team to meet discharge needs  SW hector with DC planning. [facility] to do in-house assessment on 01/17

## 2019-01-16 NOTE — PROGRESS NOTES
Hospital Medicine Daily Progress Note    Date of Service  1/16/2019    Chief Complaint  53 y.o. male admitted 12/28/2018 with syncope.    Hospital Course    PMH CHD, DM, HTN, HLD, and recent hospitalization for afib s/p cardioversion who had difficult to control orthostatic hypotension at that time. He had right and left cardiac cath at that time and no cardiac cause of orthostatic hypotension was identified. He was discharged on florinef, salt tabs, midodrine.  He presented with syncope while walking and back pain. He was found with L1 compression fracture. Dr Pepper with NSG recommended TLSO and management for osteoporosis. Had had CT head and carotid doppler that was unremarkable. Recent TTE showed normal EF with mild MR and TR. MRI brain showed chronic changes. TSH was normal. He has orthostatic hypotension that's been difficult to correct despite multiple medications.   He has chronic leg swelling that has been worse since lasix stopped for hypotension. Doppler was negative for DVT bilaterally. He was noted to have right hand cellulitis and treated with clindamycin.  He continues to feel dizzy at times even when laying down. Suffered from a syncopal episode while sitting at edge of bed resulting in fall. Code blue was called as patient was unresponsive for several seconds. This followed by a psychogenic non epileptic seizure. Luckily patient did not suffer from any significant trauma from this fall. Upon discussion, patient's xarelto was discontinued as we both felt the risks of bleeding at this time outweigh the benefits due to his recurrent falls. Attempt was made to get droxidopa as an outpatient started in the hospital but this is a specialized medication which requires documents to be completed for initiation.  Recliner wheelchair, larry lift, and bedside commode has been arranged for patient and he understands he will likely need to stay in that position due to his debilitating orthostatic hypotension. He  will also need outpatient arrangement of some orthostatic hypotension specialist.        Interval Problem Update  Afebrile, no acute events overnight, no acute complains at this time.       Consultants/Specialty  None    Code Status  Full    Disposition  F/u [facility]  Home with HH. Pending arrival of larry lift at home to ensure safe discharge plan.      Review of Systems  Review of Systems   Constitutional: Negative for chills and fever.   HENT: Negative for congestion.    Eyes: Negative for blurred vision and double vision.   Respiratory: Negative for cough and shortness of breath.    Cardiovascular: Positive for leg swelling. Negative for chest pain.   Gastrointestinal: Negative for abdominal pain and nausea.   Genitourinary: Negative for dysuria and urgency.   Musculoskeletal: Negative for neck pain.   Skin: Negative for rash.   Neurological: Negative for dizziness (absent today) and headaches.        Physical Exam  Temp:  [36.3 °C (97.3 °F)-36.9 °C (98.4 °F)] 36.7 °C (98.1 °F)  Pulse:  [50-95] 71  Resp:  [16-18] 18  BP: ()/(45-97) 111/78  SpO2:  [92 %-98 %] 96 %    Physical Exam   Constitutional: He is oriented to person, place, and time. He appears well-developed and well-nourished.   HENT:   Head: Normocephalic.   Mouth/Throat: Oropharynx is clear and moist.   Eyes: Conjunctivae are normal. Right eye exhibits no discharge. Left eye exhibits no discharge.   Neck: Neck supple. No JVD present.   Cardiovascular: Normal rate and regular rhythm.    No murmur heard.  Pulmonary/Chest: Effort normal and breath sounds normal. No respiratory distress. He has no wheezes.   Abdominal: Soft. He exhibits no distension. There is no tenderness.   Musculoskeletal: He exhibits edema (LE). He exhibits no tenderness.   Neurological: He is alert and oriented to person, place, and time.   Skin: Skin is warm and dry.   Nursing note and vitals reviewed.      Fluids    Intake/Output Summary (Last 24 hours) at 01/16/19  1154  Last data filed at 01/16/19 1143   Gross per 24 hour   Intake              975 ml   Output             1245 ml   Net             -270 ml       Laboratory                        Imaging  DX-HIP-UNILATERAL-WITH PELVIS-1 VIEW RIGHT   Final Result      No radiographic evidence of acute traumatic injury right hip.      DX-CHEST-PORTABLE (1 VIEW)   Final Result      Hypoinflation without other evidence for acute cardiopulmonary disease.      MR-BRAIN-W/O   Final Result      1.  MRI of the brain without contrast within normal limits for age with mild white matter changes.   2.  Chronic appearing sinus disease   3.  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 This does not have an aggressive appearance.      US-EXTREMITY VENOUS LOWER BILAT   Final Result      CT-HAND WITH RIGHT   Final Result      1.  Edema in the dorsum of the forearm and hand without a discrete fluid collection to suggest abscess is identified.      US-CAROTID DOPPLER BILAT   Final Result      DX-LUMBAR SPINE-2 OR 3 VIEWS   Final Result         Stable compression deformity in the L1 vertebral body.      CT-CTA CHEST PULMONARY ARTERY W/ RECONS   Final Result         1. No CT evidence of pulmonary embolism.      2. Old right rib fractures.      3. Cholelithiasis. Enlarged spleen with granulomas.      4. Mild superior endplate compression deformity of L1 could be acute. Correlate with point tenderness. No retropulsion. No malalignment.      CT-CSPINE WITHOUT PLUS RECONS   Final Result         1. No acute fracture from C1 through T3 is visualized.         CT-HEAD W/O   Final Result         1. No acute intracranial abnormality. No evidence of acute intracranial hemorrhage or mass lesion.               DX-THORACIC SPINE-WITH SWIMMERS VIEW   Final Result      No acute thoracic spine fracture or subluxation identified.      DX-CHEST-PORTABLE (1 VIEW)   Final Result      No acute cardiac or pulmonary abnormality  "is noted.           Assessment/Plan  * Orthostatic hypotension- (present on admission)   Assessment & Plan    Possibly secondary to dysautonomia secondary to diabetes. Recently admitted for this issue for almost a mohan  Ongoing despite midodrine, florinef, mestinon, reglan, vit B12 and solumedrol, salt tabs, compression stockings  PTOT  Recliner wheelchair, larry, and bedside commode have been ordered  Attempt was made to work with SW to  outpatient prescription of droxidopa so we may start in house but it is unfortunately a very specialized medication. I discussed with Dr. Childress about neurology assistance with starting this medication but he recommended outpatient follow up  Ultimate plan is to arrange for a safe discharge with patient's current condition and severe symptoms which are not being controlled with above measures         Fall from ground level   Assessment & Plan    Fell while sitting at bedside on 1/9. See above     Lumbar compression fracture (HCC)   Assessment & Plan    Pain management as needed.    Neurosurgery recommended TLSO brace.   PT OT recommended home health.    F/u with [facility]     Psychogenic nonepileptic seizure- (present on admission)   Assessment & Plan    Pt had seizure like episode after he fell  Pt well known to me from prior admission. Has been evaluated by neuro and normal EEG even during \"episodes\"  Patient stops seizing while speaking with him  AVOID benzodiazepines     Vitamin B12 deficiency- (present on admission)   Assessment & Plan    Started intramuscular injections       Syncope- (present on admission)   Assessment & Plan    Due to orthostasis.    CT of the head unremarkable. MRI brain showed chronic changes  Carotid Doppler did not reveal significant stenosis  Recent TTE showed normal EF, mild TR and MR  1/9: syncopal episode while sitting at edge of bed.   Pt has been  advised to not sit up without assistance     Chronic diastolic heart failure (HCC)- (present " on admission)   Assessment & Plan    Without current exacerbation.    Chronic lower extremity edema -chronic venous insufficiency     Acute respiratory failure with hypoxia (HCC)   Assessment & Plan    resolved     Venous stasis dermatitis of both lower extremities- (present on admission)   Assessment & Plan    Holding lasix  Compression stockings     Bilateral leg edema- (present on admission)   Assessment & Plan    Appears to have chronic edematous changes.  Likely secondary to venous insufficiency.    Recent TTE showed intact EF  Lasix has been held for orthostatic hypotension  Ultrasound for DVT was study was done and it was negative for DVT  Compression stockings ordered     Paroxysmal A-fib (HCC)- (present on admission)   Assessment & Plan    Continue xarelto and amiodarone  Sinus rhythm  Patient's [id number] is 3 indicating 4.6% of stroke/tia/systemic embolisation per year. HASBLED score is 2 indicating 4.1% bleeding risk. Due to patient's significant orthostatic hypotension and recurrent falls the risks of bleeding outweigh the benefit of anticoagulation at this point of time.  I discussed this in detail with patient and he is agreeable.  Educated patient that if at some point in time his orthostatic hypotension and falls resolve/improve then at that time might be a good time to resume anticoagulation.  I also discussed with him that if he did want to resume anticoagulation that I would recommend warfarin as there is a reversal agent for this.   on aspirin        Essential hypertension- (present on admission)   Assessment & Plan    Lasix held for orthostatic hypotension  Midodrine will result in supine hypertension but this is necessary to try to optomize orthostatic hypotension     Mixed hyperlipidemia- (present on admission)   Assessment & Plan    Continue current therapy           VTE prophylaxis: SCDs

## 2019-01-16 NOTE — CARE PLAN
Problem: Communication  Goal: The ability to communicate needs accurately and effectively will improve  Outcome: PROGRESSING AS EXPECTED  Pt is able to verbalize needs and uses call light appropriately.     Problem: Safety  Goal: Will remain free from falls  Outcome: PROGRESSING AS EXPECTED  Pt is a two person assist due to orthostatic hypotension. Bed alarm in place. Bed in lowest and locked position.

## 2019-01-16 NOTE — DISCHARGE PLANNING
Anticipated Discharge Disposition: Possible [facility]    Action: NELLYW received PC from Laura at [facility]. Laura will be available to do in-person assessment at 0800 on 1/17/19. LSW informed bedside RN and MD. Pt has already been accepted by [facility].     Barriers to Discharge: Placement    Plan: Await assessment from [facility].

## 2019-01-17 LAB
ANION GAP SERPL CALC-SCNC: 7 MMOL/L (ref 0–11.9)
BUN SERPL-MCNC: 32 MG/DL (ref 8–22)
CALCIUM SERPL-MCNC: 9.2 MG/DL (ref 8.5–10.5)
CHLORIDE SERPL-SCNC: 104 MMOL/L (ref 96–112)
CO2 SERPL-SCNC: 25 MMOL/L (ref 20–33)
CREAT SERPL-MCNC: 0.69 MG/DL (ref 0.5–1.4)
ERYTHROCYTE [DISTWIDTH] IN BLOOD BY AUTOMATED COUNT: 45.3 FL (ref 35.9–50)
GLUCOSE SERPL-MCNC: 94 MG/DL (ref 65–99)
HCT VFR BLD AUTO: 43.2 % (ref 42–52)
HGB BLD-MCNC: 13.8 G/DL (ref 14–18)
MCH RBC QN AUTO: 27.3 PG (ref 27–33)
MCHC RBC AUTO-ENTMCNC: 31.9 G/DL (ref 33.7–35.3)
MCV RBC AUTO: 85.5 FL (ref 81.4–97.8)
PLATELET # BLD AUTO: 216 K/UL (ref 164–446)
PMV BLD AUTO: 10.3 FL (ref 9–12.9)
POTASSIUM SERPL-SCNC: 3.9 MMOL/L (ref 3.6–5.5)
RBC # BLD AUTO: 5.05 M/UL (ref 4.7–6.1)
SODIUM SERPL-SCNC: 136 MMOL/L (ref 135–145)
WBC # BLD AUTO: 9.2 K/UL (ref 4.8–10.8)

## 2019-01-17 PROCEDURE — 700102 HCHG RX REV CODE 250 W/ 637 OVERRIDE(OP): Performed by: HOSPITALIST

## 2019-01-17 PROCEDURE — G0378 HOSPITAL OBSERVATION PER HR: HCPCS

## 2019-01-17 PROCEDURE — A9270 NON-COVERED ITEM OR SERVICE: HCPCS | Performed by: HOSPITALIST

## 2019-01-17 PROCEDURE — A9270 NON-COVERED ITEM OR SERVICE: HCPCS | Performed by: INTERNAL MEDICINE

## 2019-01-17 PROCEDURE — 85027 COMPLETE CBC AUTOMATED: CPT

## 2019-01-17 PROCEDURE — 700102 HCHG RX REV CODE 250 W/ 637 OVERRIDE(OP): Performed by: INTERNAL MEDICINE

## 2019-01-17 PROCEDURE — 80048 BASIC METABOLIC PNL TOTAL CA: CPT

## 2019-01-17 PROCEDURE — 99224 PR SUBSEQUENT OBSERVATION CARE,LEVEL I: CPT | Performed by: INTERNAL MEDICINE

## 2019-01-17 PROCEDURE — 36415 COLL VENOUS BLD VENIPUNCTURE: CPT

## 2019-01-17 RX ADMIN — MIDODRINE HYDROCHLORIDE 10 MG: 5 TABLET ORAL at 17:19

## 2019-01-17 RX ADMIN — GUAIFENESIN 600 MG: 600 TABLET, EXTENDED RELEASE ORAL at 04:55

## 2019-01-17 RX ADMIN — MIDODRINE HYDROCHLORIDE 10 MG: 5 TABLET ORAL at 04:55

## 2019-01-17 RX ADMIN — PRAVASTATIN SODIUM 20 MG: 20 TABLET ORAL at 17:19

## 2019-01-17 RX ADMIN — PYRIDOSTIGMINE BROMIDE 90 MG: 60 TABLET ORAL at 04:55

## 2019-01-17 RX ADMIN — Medication 400 MG: at 04:55

## 2019-01-17 RX ADMIN — SODIUM CHLORIDE TAB 1 GM 1 G: 1 TAB at 17:19

## 2019-01-17 RX ADMIN — PYRIDOSTIGMINE BROMIDE 90 MG: 60 TABLET ORAL at 11:56

## 2019-01-17 RX ADMIN — FLUOXETINE HYDROCHLORIDE 20 MG: 20 CAPSULE ORAL at 04:55

## 2019-01-17 RX ADMIN — MULTIPLE VITAMINS W/ MINERALS TAB 1 TABLET: TAB at 04:54

## 2019-01-17 RX ADMIN — AMIODARONE HYDROCHLORIDE 50 MG: 200 TABLET ORAL at 04:56

## 2019-01-17 RX ADMIN — FLUDROCORTISONE ACETATE 0.3 MG: 0.1 TABLET ORAL at 04:57

## 2019-01-17 RX ADMIN — SODIUM CHLORIDE TAB 1 GM 1 G: 1 TAB at 11:56

## 2019-01-17 RX ADMIN — GUAIFENESIN 600 MG: 600 TABLET, EXTENDED RELEASE ORAL at 17:19

## 2019-01-17 RX ADMIN — METOCLOPRAMIDE HYDROCHLORIDE 10 MG: 10 TABLET ORAL at 11:56

## 2019-01-17 RX ADMIN — SODIUM CHLORIDE TAB 1 GM 1 G: 1 TAB at 07:56

## 2019-01-17 RX ADMIN — METOCLOPRAMIDE HYDROCHLORIDE 10 MG: 10 TABLET ORAL at 06:14

## 2019-01-17 RX ADMIN — PYRIDOSTIGMINE BROMIDE 90 MG: 60 TABLET ORAL at 17:18

## 2019-01-17 RX ADMIN — NYSTATIN: 100000 POWDER TOPICAL at 04:57

## 2019-01-17 RX ADMIN — ASPIRIN 81 MG 81 MG: 81 TABLET ORAL at 04:55

## 2019-01-17 RX ADMIN — OXYCODONE HYDROCHLORIDE AND ACETAMINOPHEN 500 MG: 500 TABLET ORAL at 04:55

## 2019-01-17 RX ADMIN — MIDODRINE HYDROCHLORIDE 10 MG: 5 TABLET ORAL at 11:56

## 2019-01-17 RX ADMIN — METOCLOPRAMIDE HYDROCHLORIDE 10 MG: 10 TABLET ORAL at 17:19

## 2019-01-17 RX ADMIN — NYSTATIN: 100000 POWDER TOPICAL at 19:52

## 2019-01-17 ASSESSMENT — PAIN SCALES - GENERAL
PAINLEVEL_OUTOF10: 5
PAINLEVEL_OUTOF10: 0
PAINLEVEL_OUTOF10: 5

## 2019-01-17 ASSESSMENT — ENCOUNTER SYMPTOMS
FEVER: 0
BLURRED VISION: 0
SHORTNESS OF BREATH: 0
HEADACHES: 0
DIZZINESS: 0
NAUSEA: 0
DOUBLE VISION: 0
ABDOMINAL PAIN: 0
COUGH: 0
NECK PAIN: 0
CHILLS: 0

## 2019-01-17 NOTE — CARE PLAN
Problem: Safety  Goal: Will remain free from falls    Intervention: Implement fall precautions  Bed/chair alarm in use, bed in lowest and locked position, non-skid socks in place, call light in reach, hourly rounding in place.      Problem: Mobility  Goal: Risk for activity intolerance will decrease    Intervention: Encourage patient to increase activity level in collaboration with Interdisciplinary Team  Patient up to chair and wheel chair 1 person assist to pivot.Pt with lap belt in place when up to chair, pt able to self-release lap belt.

## 2019-01-17 NOTE — PROGRESS NOTES
Assumed patient care at 0700. Received report from night shift. Assessment completed. A&Ox 4. No complaints of pain at this time. No s/s of distress. Pt up to chair for meal; standby assist. High fall risk,  precautions in place; alarm on, treaded socks on and lap belt across pt chest, personal possessions and call light placed within reach. Communication board updated. Patient denies any additional needs at this time.

## 2019-01-17 NOTE — PROGRESS NOTES
Hospital Medicine Daily Progress Note    Date of Service  1/17/2019    Chief Complaint  53 y.o. male admitted 12/28/2018 with syncope.    Hospital Course    PMH CHD, DM, HTN, HLD, and recent hospitalization for afib s/p cardioversion who had difficult to control orthostatic hypotension at that time. He had right and left cardiac cath at that time and no cardiac cause of orthostatic hypotension was identified. He was discharged on florinef, salt tabs, midodrine.  He presented with syncope while walking and back pain. He was found with L1 compression fracture. Dr Pepper with NSG recommended TLSO and management for osteoporosis. Had had CT head and carotid doppler that was unremarkable. Recent TTE showed normal EF with mild MR and TR. MRI brain showed chronic changes. TSH was normal. He has orthostatic hypotension that's been difficult to correct despite multiple medications.   He has chronic leg swelling that has been worse since lasix stopped for hypotension. Doppler was negative for DVT bilaterally. He was noted to have right hand cellulitis and treated with clindamycin.  He continues to feel dizzy at times even when laying down. Suffered from a syncopal episode while sitting at edge of bed resulting in fall. Code blue was called as patient was unresponsive for several seconds. This followed by a psychogenic non epileptic seizure. Luckily patient did not suffer from any significant trauma from this fall. Upon discussion, patient's xarelto was discontinued as we both felt the risks of bleeding at this time outweigh the benefits due to his recurrent falls. Attempt was made to get droxidopa as an outpatient started in the hospital but this is a specialized medication which requires documents to be completed for initiation.  Recliner wheelchair, larry lift, and bedside commode has been arranged for patient and he understands he will likely need to stay in that position due to his debilitating orthostatic hypotension. He  will also need outpatient arrangement of some orthostatic hypotension specialist.        Interval Problem Update  No acute events overnight, no acute complains at this time.   Pending placement      Consultants/Specialty  None    Code Status  Full    Disposition  TBD       Review of Systems  Review of Systems   Constitutional: Negative for chills and fever.   HENT: Negative for congestion.    Eyes: Negative for blurred vision and double vision.   Respiratory: Negative for cough and shortness of breath.    Cardiovascular: Positive for leg swelling. Negative for chest pain.   Gastrointestinal: Negative for abdominal pain and nausea.   Genitourinary: Negative for dysuria and urgency.   Musculoskeletal: Negative for neck pain.   Skin: Negative for rash.   Neurological: Negative for dizziness (absent today) and headaches.        Physical Exam  Temp:  [36.2 °C (97.2 °F)-37.2 °C (99 °F)] 37.2 °C (99 °F)  Pulse:  [60-90] 90  Resp:  [16-18] 18  BP: ()/(56-99) 85/56  SpO2:  [93 %-97 %] 97 %    Physical Exam   Constitutional: He is oriented to person, place, and time. He appears well-developed and well-nourished.   HENT:   Head: Normocephalic.   Mouth/Throat: Oropharynx is clear and moist.   Eyes: Conjunctivae are normal. Right eye exhibits no discharge. Left eye exhibits no discharge.   Neck: Neck supple. No JVD present.   Cardiovascular: Normal rate and regular rhythm.    No murmur heard.  Pulmonary/Chest: Effort normal and breath sounds normal. No respiratory distress. He has no wheezes.   Abdominal: Soft. He exhibits no distension. There is no tenderness.   Musculoskeletal: He exhibits edema (LE). He exhibits no tenderness.   Neurological: He is alert and oriented to person, place, and time.   Skin: Skin is warm and dry.   Nursing note and vitals reviewed.      Fluids    Intake/Output Summary (Last 24 hours) at 01/17/19 1325  Last data filed at 01/17/19 0900   Gross per 24 hour   Intake              840 ml   Output               800 ml   Net               40 ml       Laboratory  Recent Labs      01/17/19   0045   WBC  9.2   RBC  5.05   HEMOGLOBIN  13.8*   HEMATOCRIT  43.2   MCV  85.5   MCH  27.3   MCHC  31.9*   RDW  45.3   PLATELETCT  216   MPV  10.3     Recent Labs      01/17/19   0045   SODIUM  136   POTASSIUM  3.9   CHLORIDE  104   CO2  25   GLUCOSE  94   BUN  32*   CREATININE  0.69   CALCIUM  9.2                   Imaging  DX-HIP-UNILATERAL-WITH PELVIS-1 VIEW RIGHT   Final Result      No radiographic evidence of acute traumatic injury right hip.      DX-CHEST-PORTABLE (1 VIEW)   Final Result      Hypoinflation without other evidence for acute cardiopulmonary disease.      MR-BRAIN-W/O   Final Result      1.  MRI of the brain without contrast within normal limits for age with mild white matter changes.   2.  Chronic appearing sinus disease   3.  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 This does not have an aggressive appearance.      US-EXTREMITY VENOUS LOWER BILAT   Final Result      CT-HAND WITH RIGHT   Final Result      1.  Edema in the dorsum of the forearm and hand without a discrete fluid collection to suggest abscess is identified.      US-CAROTID DOPPLER BILAT   Final Result      DX-LUMBAR SPINE-2 OR 3 VIEWS   Final Result         Stable compression deformity in the L1 vertebral body.      CT-CTA CHEST PULMONARY ARTERY W/ RECONS   Final Result         1. No CT evidence of pulmonary embolism.      2. Old right rib fractures.      3. Cholelithiasis. Enlarged spleen with granulomas.      4. Mild superior endplate compression deformity of L1 could be acute. Correlate with point tenderness. No retropulsion. No malalignment.      CT-CSPINE WITHOUT PLUS RECONS   Final Result         1. No acute fracture from C1 through T3 is visualized.         CT-HEAD W/O   Final Result         1. No acute intracranial abnormality. No evidence of acute intracranial hemorrhage or mass lesion.  "              DX-THORACIC SPINE-WITH SWIMMERS VIEW   Final Result      No acute thoracic spine fracture or subluxation identified.      DX-CHEST-PORTABLE (1 VIEW)   Final Result      No acute cardiac or pulmonary abnormality is noted.           Assessment/Plan  * Orthostatic hypotension- (present on admission)   Assessment & Plan    Possibly secondary to dysautonomia secondary to diabetes. Recently admitted for this issue for almost a mohan  Ongoing despite midodrine, florinef, mestinon, reglan, vit B12 and solumedrol, salt tabs, compression stockings  PTOT  Recliner wheelchair, larry, and bedside commode have been ordered  Attempt was made to work with SW to  outpatient prescription of droxidopa so we may start in house but it is unfortunately a very specialized medication. I discussed with Dr. Childress about neurology assistance with starting this medication but he recommended outpatient follow up  Ultimate plan is to arrange for a safe discharge with patient's current condition and severe symptoms which are not being controlled with above measures         Fall from ground level   Assessment & Plan    Fell while sitting at bedside on 1/9. See above     Lumbar compression fracture (HCC)   Assessment & Plan    Pain management as needed.    Neurosurgery recommended TLSO brace.   PT OT recommended home health.    F/u with [facility]     Psychogenic nonepileptic seizure- (present on admission)   Assessment & Plan    Pt had seizure like episode after he fell  Pt well known to me from prior admission. Has been evaluated by neuro and normal EEG even during \"episodes\"  Patient stops seizing while speaking with him  AVOID benzodiazepines     Vitamin B12 deficiency- (present on admission)   Assessment & Plan    Started intramuscular injections       Syncope- (present on admission)   Assessment & Plan    Due to orthostasis.    CT of the head unremarkable. MRI brain showed chronic changes  Carotid Doppler did not reveal " significant stenosis  Recent TTE showed normal EF, mild TR and MR  1/9: syncopal episode while sitting at edge of bed.   Pt has been  advised to not sit up without assistance     Chronic diastolic heart failure (HCC)- (present on admission)   Assessment & Plan    Without current exacerbation.    Chronic lower extremity edema -chronic venous insufficiency     Acute respiratory failure with hypoxia ([facility])   Assessment & Plan    resolved     Venous stasis dermatitis of both lower extremities- (present on admission)   Assessment & Plan    Holding lasix  Compression stockings     Bilateral leg edema- (present on admission)   Assessment & Plan    Appears to have chronic edematous changes.  Likely secondary to venous insufficiency.    Recent TTE showed intact EF  Lasix has been held for orthostatic hypotension  Ultrasound for DVT was study was done and it was negative for DVT  Compression stockings ordered     Paroxysmal A-fib ([facility])- (present on admission)   Assessment & Plan    Continue xarelto and amiodarone  Sinus rhythm  Patient's [id number] is 3 indicating 4.6% of stroke/tia/systemic embolisation per year. HASBLED score is 2 indicating 4.1% bleeding risk. Due to patient's significant orthostatic hypotension and recurrent falls the risks of bleeding outweigh the benefit of anticoagulation at this point of time.  I discussed this in detail with patient and he is agreeable.  Educated patient that if at some point in time his orthostatic hypotension and falls resolve/improve then at that time might be a good time to resume anticoagulation.  I also discussed with him that if he did want to resume anticoagulation that I would recommend warfarin as there is a reversal agent for this.   on aspirin        Essential hypertension- (present on admission)   Assessment & Plan    Lasix held for orthostatic hypotension  Midodrine will result in supine hypertension but this is necessary to try to optomize orthostatic hypotension      Mixed hyperlipidemia- (present on admission)   Assessment & Plan    Continue current therapy           VTE prophylaxis: SCDs

## 2019-01-17 NOTE — CARE PLAN
Problem: Safety  Goal: Will remain free from falls  Outcome: PROGRESSING SLOWER THAN EXPECTED  Pt still experiencing orthostatic hypotension, dizziness/lightheadedness upon standing. Pt is a one assist to the chair. Fall precautions in place.     Problem: Discharge Barriers/Planning  Goal: Patient's continuum of care needs will be met  Outcome: PROGRESSING AS EXPECTED  Pt expected to be discharged to [facility] with Home Health.

## 2019-01-17 NOTE — THERAPY
"Occupational Therapy Treatment completed with focus on ADLs.  Functional Status: Supv supine > < EOB, min A to don LSO, min A LB dressing without AE, SBA sit to stands and marching in place  Plan of Care: Will benefit from Occupational Therapy 3 times per week  Discharge Recommendations:  Equipment Will Continue to Assess for Equipment Needs. Post-acute therapy Discharge to home with outpatient or home health for additional skilled therapy services.    See \"Rehab Therapy-Acute\" Patient Summary Report for complete documentation.     Pt seen for OT tx. Per RN, continues to have hypotension with activity beyond supine. Tx focused on BP monitoring with activity. Pt mobilized to EOB, donned LSO, completed sock doffing/donning (difficulty with L sock, would benefit from sock aid), completed oral care. Pt then completed sit to stand at FWW and marching in place. Pt became symptomatic after 3 min. Symptoms resolved after <1 min of sitting. Repeated standing at FWW with marching and again became symptomatic at 3 minute db. Again, resolved quickly upon sitting. BPs as follows: supine with HOB up 141/76, sitting EOB 4 min 159/82, sitting EOB 13 min 168/76, standing 3 min 104/67, sitting <1 min 153/72, standing 3 min 93/75. Pt demos improved sitting tolerance, but continues to be limited with standing due to hypotension. Encouraged up to chair with nursing for meals. Discussed with RN and agreed that self-release belt around chest would be prudent in case of hypotension. OT to continue following.   "

## 2019-01-18 PROCEDURE — 700102 HCHG RX REV CODE 250 W/ 637 OVERRIDE(OP): Performed by: INTERNAL MEDICINE

## 2019-01-18 PROCEDURE — A9270 NON-COVERED ITEM OR SERVICE: HCPCS | Performed by: INTERNAL MEDICINE

## 2019-01-18 PROCEDURE — 700102 HCHG RX REV CODE 250 W/ 637 OVERRIDE(OP): Performed by: HOSPITALIST

## 2019-01-18 PROCEDURE — A9270 NON-COVERED ITEM OR SERVICE: HCPCS | Performed by: HOSPITALIST

## 2019-01-18 PROCEDURE — 99224 PR SUBSEQUENT OBSERVATION CARE,LEVEL I: CPT | Performed by: INTERNAL MEDICINE

## 2019-01-18 PROCEDURE — G0378 HOSPITAL OBSERVATION PER HR: HCPCS

## 2019-01-18 RX ADMIN — ACETAMINOPHEN 650 MG: 325 TABLET, FILM COATED ORAL at 17:37

## 2019-01-18 RX ADMIN — Medication 400 MG: at 05:51

## 2019-01-18 RX ADMIN — PRAVASTATIN SODIUM 20 MG: 20 TABLET ORAL at 17:39

## 2019-01-18 RX ADMIN — METOCLOPRAMIDE HYDROCHLORIDE 10 MG: 10 TABLET ORAL at 17:38

## 2019-01-18 RX ADMIN — FLUOXETINE HYDROCHLORIDE 20 MG: 20 CAPSULE ORAL at 05:51

## 2019-01-18 RX ADMIN — ACETAMINOPHEN 650 MG: 325 TABLET, FILM COATED ORAL at 08:01

## 2019-01-18 RX ADMIN — MIDODRINE HYDROCHLORIDE 10 MG: 5 TABLET ORAL at 12:11

## 2019-01-18 RX ADMIN — PYRIDOSTIGMINE BROMIDE 90 MG: 60 TABLET ORAL at 18:00

## 2019-01-18 RX ADMIN — NYSTATIN: 100000 POWDER TOPICAL at 17:39

## 2019-01-18 RX ADMIN — MIDODRINE HYDROCHLORIDE 10 MG: 5 TABLET ORAL at 17:38

## 2019-01-18 RX ADMIN — SODIUM CHLORIDE TAB 1 GM 1 G: 1 TAB at 07:57

## 2019-01-18 RX ADMIN — MULTIPLE VITAMINS W/ MINERALS TAB 1 TABLET: TAB at 05:51

## 2019-01-18 RX ADMIN — NYSTATIN: 100000 POWDER TOPICAL at 05:49

## 2019-01-18 RX ADMIN — SODIUM CHLORIDE TAB 1 GM 1 G: 1 TAB at 17:37

## 2019-01-18 RX ADMIN — GUAIFENESIN 600 MG: 600 TABLET, EXTENDED RELEASE ORAL at 05:51

## 2019-01-18 RX ADMIN — GUAIFENESIN 600 MG: 600 TABLET, EXTENDED RELEASE ORAL at 17:37

## 2019-01-18 RX ADMIN — FLUDROCORTISONE ACETATE 0.3 MG: 0.1 TABLET ORAL at 05:49

## 2019-01-18 RX ADMIN — ASPIRIN 81 MG 81 MG: 81 TABLET ORAL at 05:50

## 2019-01-18 RX ADMIN — METOCLOPRAMIDE HYDROCHLORIDE 10 MG: 10 TABLET ORAL at 05:50

## 2019-01-18 RX ADMIN — AMIODARONE HYDROCHLORIDE 50 MG: 200 TABLET ORAL at 05:51

## 2019-01-18 RX ADMIN — MIDODRINE HYDROCHLORIDE 10 MG: 5 TABLET ORAL at 05:51

## 2019-01-18 RX ADMIN — PYRIDOSTIGMINE BROMIDE 90 MG: 60 TABLET ORAL at 05:50

## 2019-01-18 RX ADMIN — SODIUM CHLORIDE TAB 1 GM 1 G: 1 TAB at 12:11

## 2019-01-18 RX ADMIN — PYRIDOSTIGMINE BROMIDE 90 MG: 60 TABLET ORAL at 13:26

## 2019-01-18 RX ADMIN — OXYCODONE HYDROCHLORIDE AND ACETAMINOPHEN 500 MG: 500 TABLET ORAL at 05:50

## 2019-01-18 RX ADMIN — METOCLOPRAMIDE HYDROCHLORIDE 10 MG: 10 TABLET ORAL at 12:11

## 2019-01-18 ASSESSMENT — ENCOUNTER SYMPTOMS
ABDOMINAL PAIN: 0
NAUSEA: 0
DOUBLE VISION: 0
FEVER: 0
DIZZINESS: 0
CHILLS: 0
NECK PAIN: 0
BLURRED VISION: 0
COUGH: 0
HEADACHES: 0
SHORTNESS OF BREATH: 0

## 2019-01-18 ASSESSMENT — PAIN SCALES - GENERAL
PAINLEVEL_OUTOF10: 0
PAINLEVEL_OUTOF10: 8

## 2019-01-18 NOTE — CARE PLAN
Problem: Fluid Volume:  Goal: Will maintain balanced intake and output  Outcome: PROGRESSING AS EXPECTED  Pt maintains adequate intake and output.     Problem: Discharge Barriers/Planning  Goal: Patient's continuum of care needs will be met  Outcome: PROGRESSING SLOWER THAN EXPECTED  [facility] declined patient related to falls.

## 2019-01-18 NOTE — PROGRESS NOTES
Hospital Medicine Daily Progress Note    Date of Service  1/18/2019    Chief Complaint  53 y.o. male admitted 12/28/2018 with syncope.    Hospital Course    PMH CHD, DM, HTN, HLD, and recent hospitalization for afib s/p cardioversion who had difficult to control orthostatic hypotension at that time. He had right and left cardiac cath at that time and no cardiac cause of orthostatic hypotension was identified. He was discharged on florinef, salt tabs, midodrine.  He presented with syncope while walking and back pain. He was found with L1 compression fracture. Dr Pepper with NSG recommended TLSO and management for osteoporosis. Had had CT head and carotid doppler that was unremarkable. Recent TTE showed normal EF with mild MR and TR. MRI brain showed chronic changes. TSH was normal. He has orthostatic hypotension that's been difficult to correct despite multiple medications.   He has chronic leg swelling that has been worse since lasix stopped for hypotension. Doppler was negative for DVT bilaterally. He was noted to have right hand cellulitis and treated with clindamycin.  He continues to feel dizzy at times even when laying down. Suffered from a syncopal episode while sitting at edge of bed resulting in fall. Code blue was called as patient was unresponsive for several seconds. This followed by a psychogenic non epileptic seizure. Luckily patient did not suffer from any significant trauma from this fall. Upon discussion, patient's xarelto was discontinued as we both felt the risks of bleeding at this time outweigh the benefits due to his recurrent falls. Attempt was made to get droxidopa as an outpatient started in the hospital but this is a specialized medication which requires documents to be completed for initiation.  Recliner wheelchair, larry lift, and bedside commode has been arranged for patient and he understands he will likely need to stay in that position due to his debilitating orthostatic hypotension. He  will also need outpatient arrangement of some orthostatic hypotension specialist.        Interval Problem Update  Pt seen and examined , no acute complains at this time.  Pending placement      Consultants/Specialty  None    Code Status  Full    Disposition  TBD       Review of Systems  Review of Systems   Constitutional: Negative for chills and fever.   HENT: Negative for congestion.    Eyes: Negative for blurred vision and double vision.   Respiratory: Negative for cough and shortness of breath.    Cardiovascular: Positive for leg swelling. Negative for chest pain.   Gastrointestinal: Negative for abdominal pain and nausea.   Genitourinary: Negative for dysuria and urgency.   Musculoskeletal: Negative for neck pain.   Skin: Negative for rash.   Neurological: Negative for dizziness (absent today) and headaches.        Physical Exam  Temp:  [36.3 °C (97.3 °F)-36.6 °C (97.8 °F)] 36.4 °C (97.6 °F)  Pulse:  [53-64] 60  Resp:  [16-18] 16  BP: (128-167)/(68-94) 154/81  SpO2:  [95 %-97 %] 96 %    Physical Exam   Constitutional: He is oriented to person, place, and time. He appears well-developed and well-nourished.   HENT:   Head: Normocephalic.   Mouth/Throat: Oropharynx is clear and moist.   Eyes: Conjunctivae are normal. Right eye exhibits no discharge. Left eye exhibits no discharge.   Neck: Neck supple. No JVD present.   Cardiovascular: Normal rate and regular rhythm.    No murmur heard.  Pulmonary/Chest: Effort normal and breath sounds normal. No respiratory distress. He has no wheezes.   Abdominal: Soft. He exhibits no distension. There is no tenderness.   Musculoskeletal: He exhibits edema (LE). He exhibits no tenderness.   Neurological: He is alert and oriented to person, place, and time.   Skin: Skin is warm and dry.   Nursing note and vitals reviewed.      Fluids    Intake/Output Summary (Last 24 hours) at 01/18/19 1308  Last data filed at 01/18/19 0800   Gross per 24 hour   Intake             1320 ml   Output               500 ml   Net              820 ml       Laboratory  Recent Labs      01/17/19   0045   WBC  9.2   RBC  5.05   HEMOGLOBIN  13.8*   HEMATOCRIT  43.2   MCV  85.5   MCH  27.3   MCHC  31.9*   RDW  45.3   PLATELETCT  216   MPV  10.3     Recent Labs      01/17/19   0045   SODIUM  136   POTASSIUM  3.9   CHLORIDE  104   CO2  25   GLUCOSE  94   BUN  32*   CREATININE  0.69   CALCIUM  9.2                   Imaging  DX-HIP-UNILATERAL-WITH PELVIS-1 VIEW RIGHT   Final Result      No radiographic evidence of acute traumatic injury right hip.      DX-CHEST-PORTABLE (1 VIEW)   Final Result      Hypoinflation without other evidence for acute cardiopulmonary disease.      MR-BRAIN-W/O   Final Result      1.  MRI of the brain without contrast within normal limits for age with mild white matter changes.   2.  Chronic appearing sinus disease   3.  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 This does not have an aggressive appearance.      US-EXTREMITY VENOUS LOWER BILAT   Final Result      CT-HAND WITH RIGHT   Final Result      1.  Edema in the dorsum of the forearm and hand without a discrete fluid collection to suggest abscess is identified.      US-CAROTID DOPPLER BILAT   Final Result      DX-LUMBAR SPINE-2 OR 3 VIEWS   Final Result         Stable compression deformity in the L1 vertebral body.      CT-CTA CHEST PULMONARY ARTERY W/ RECONS   Final Result         1. No CT evidence of pulmonary embolism.      2. Old right rib fractures.      3. Cholelithiasis. Enlarged spleen with granulomas.      4. Mild superior endplate compression deformity of L1 could be acute. Correlate with point tenderness. No retropulsion. No malalignment.      CT-CSPINE WITHOUT PLUS RECONS   Final Result         1. No acute fracture from C1 through T3 is visualized.         CT-HEAD W/O   Final Result         1. No acute intracranial abnormality. No evidence of acute intracranial hemorrhage or mass lesion.  "              DX-THORACIC SPINE-WITH SWIMMERS VIEW   Final Result      No acute thoracic spine fracture or subluxation identified.      DX-CHEST-PORTABLE (1 VIEW)   Final Result      No acute cardiac or pulmonary abnormality is noted.           Assessment/Plan  * Orthostatic hypotension- (present on admission)   Assessment & Plan    Possibly secondary to dysautonomia secondary to diabetes. Recently admitted for this issue for almost a mohan  Ongoing despite midodrine, florinef, mestinon, reglan, vit B12 and solumedrol, salt tabs, compression stockings  PTOT  Recliner wheelchair, larry, and bedside commode have been ordered  Attempt was made to work with SW to  outpatient prescription of droxidopa so we may start in house but it is unfortunately a very specialized medication. I discussed with Dr. Childress about neurology assistance with starting this medication but he recommended outpatient follow up  Ultimate plan is to arrange for a safe discharge with patient's current condition and severe symptoms which are not being controlled with above measures         Fall from ground level   Assessment & Plan    Fell while sitting at bedside on 1/9. See above     Lumbar compression fracture (HCC)   Assessment & Plan    Pain management as needed.    Neurosurgery recommended TLSO brace.   PT OT recommended home health.    F/u with [facility]     Psychogenic nonepileptic seizure- (present on admission)   Assessment & Plan    Pt had seizure like episode after he fell  Pt well known to me from prior admission. Has been evaluated by neuro and normal EEG even during \"episodes\"  Patient stops seizing while speaking with him  AVOID benzodiazepines     Vitamin B12 deficiency- (present on admission)   Assessment & Plan    Started intramuscular injections       Syncope- (present on admission)   Assessment & Plan    Due to orthostasis.    CT of the head unremarkable. MRI brain showed chronic changes  Carotid Doppler did not reveal " significant stenosis  Recent TTE showed normal EF, mild TR and MR  1/9: syncopal episode while sitting at edge of bed.   Pt has been  advised to not sit up without assistance     Chronic diastolic heart failure (HCC)- (present on admission)   Assessment & Plan    Without current exacerbation.    Chronic lower extremity edema -chronic venous insufficiency     Acute respiratory failure with hypoxia ([facility])   Assessment & Plan    resolved     Venous stasis dermatitis of both lower extremities- (present on admission)   Assessment & Plan    Holding lasix  Compression stockings     Bilateral leg edema- (present on admission)   Assessment & Plan    Appears to have chronic edematous changes.  Likely secondary to venous insufficiency.    Recent TTE showed intact EF  Lasix has been held for orthostatic hypotension  Ultrasound for DVT was study was done and it was negative for DVT  Compression stockings ordered     Paroxysmal A-fib ([facility])- (present on admission)   Assessment & Plan    Continue xarelto and amiodarone  Sinus rhythm  Patient's [id number] is 3 indicating 4.6% of stroke/tia/systemic embolisation per year. HASBLED score is 2 indicating 4.1% bleeding risk. Due to patient's significant orthostatic hypotension and recurrent falls the risks of bleeding outweigh the benefit of anticoagulation at this point of time.  I discussed this in detail with patient and he is agreeable.  Educated patient that if at some point in time his orthostatic hypotension and falls resolve/improve then at that time might be a good time to resume anticoagulation.  I also discussed with him that if he did want to resume anticoagulation that I would recommend warfarin as there is a reversal agent for this.   on aspirin        Essential hypertension- (present on admission)   Assessment & Plan    Lasix held for orthostatic hypotension  Midodrine will result in supine hypertension but this is necessary to try to optomize orthostatic hypotension      Mixed hyperlipidemia- (present on admission)   Assessment & Plan    Continue current therapy           VTE prophylaxis: SCDs

## 2019-01-18 NOTE — PROGRESS NOTES
Assumed care at 0700 after report received from night RN. Pt A/Ox 4, on RA, pain rating 8/10 in lower back, medication given. Pt up to chair for breakfast. Lap belt placed around patient for safety as he has hx of passing out since admit. Pt placed belt around self and is able to demonstrate ability to unbuckle as needed. All Fall precautions in place, pt steady with SBA and FWW, call light within reach. No complaints of dizziness at this time

## 2019-01-18 NOTE — CARE PLAN
Problem: Safety  Goal: Will remain free from falls  Outcome: PROGRESSING AS EXPECTED  Fall precautions on place. Self administered lap belt on for safety. Pt able to demonstrate ability to unbuckle belt    Problem: Bowel/Gastric:  Goal: Will not experience complications related to bowel motility  Outcome: PROGRESSING AS EXPECTED  No problems with regular BMs at this time

## 2019-01-19 PROCEDURE — 700102 HCHG RX REV CODE 250 W/ 637 OVERRIDE(OP): Performed by: HOSPITALIST

## 2019-01-19 PROCEDURE — A9270 NON-COVERED ITEM OR SERVICE: HCPCS | Performed by: HOSPITALIST

## 2019-01-19 PROCEDURE — 306312 ANTI-EMBOLISM STOCKINGS XXXLG LNG: Performed by: HOSPITALIST

## 2019-01-19 PROCEDURE — 700102 HCHG RX REV CODE 250 W/ 637 OVERRIDE(OP): Performed by: INTERNAL MEDICINE

## 2019-01-19 PROCEDURE — A9270 NON-COVERED ITEM OR SERVICE: HCPCS | Performed by: INTERNAL MEDICINE

## 2019-01-19 PROCEDURE — 99224 PR SUBSEQUENT OBSERVATION CARE,LEVEL I: CPT | Performed by: INTERNAL MEDICINE

## 2019-01-19 PROCEDURE — G0378 HOSPITAL OBSERVATION PER HR: HCPCS

## 2019-01-19 RX ADMIN — PRAVASTATIN SODIUM 20 MG: 20 TABLET ORAL at 17:23

## 2019-01-19 RX ADMIN — NYSTATIN: 100000 POWDER TOPICAL at 05:10

## 2019-01-19 RX ADMIN — Medication 400 MG: at 05:09

## 2019-01-19 RX ADMIN — PYRIDOSTIGMINE BROMIDE 90 MG: 60 TABLET ORAL at 11:34

## 2019-01-19 RX ADMIN — GUAIFENESIN 600 MG: 600 TABLET, EXTENDED RELEASE ORAL at 17:23

## 2019-01-19 RX ADMIN — SODIUM CHLORIDE TAB 1 GM 1 G: 1 TAB at 17:24

## 2019-01-19 RX ADMIN — FLUDROCORTISONE ACETATE 0.3 MG: 0.1 TABLET ORAL at 05:07

## 2019-01-19 RX ADMIN — ASPIRIN 81 MG 81 MG: 81 TABLET ORAL at 05:08

## 2019-01-19 RX ADMIN — GUAIFENESIN 600 MG: 600 TABLET, EXTENDED RELEASE ORAL at 05:08

## 2019-01-19 RX ADMIN — SODIUM CHLORIDE TAB 1 GM 1 G: 1 TAB at 11:30

## 2019-01-19 RX ADMIN — MIDODRINE HYDROCHLORIDE 10 MG: 5 TABLET ORAL at 17:23

## 2019-01-19 RX ADMIN — PYRIDOSTIGMINE BROMIDE 90 MG: 60 TABLET ORAL at 05:06

## 2019-01-19 RX ADMIN — MULTIPLE VITAMINS W/ MINERALS TAB 1 TABLET: TAB at 05:08

## 2019-01-19 RX ADMIN — METOCLOPRAMIDE HYDROCHLORIDE 10 MG: 10 TABLET ORAL at 17:24

## 2019-01-19 RX ADMIN — SODIUM CHLORIDE TAB 1 GM 1 G: 1 TAB at 08:30

## 2019-01-19 RX ADMIN — OXYCODONE HYDROCHLORIDE AND ACETAMINOPHEN 500 MG: 500 TABLET ORAL at 05:09

## 2019-01-19 RX ADMIN — NYSTATIN: 100000 POWDER TOPICAL at 18:00

## 2019-01-19 RX ADMIN — MIDODRINE HYDROCHLORIDE 10 MG: 5 TABLET ORAL at 11:30

## 2019-01-19 RX ADMIN — METOCLOPRAMIDE HYDROCHLORIDE 10 MG: 10 TABLET ORAL at 05:07

## 2019-01-19 RX ADMIN — FLUOXETINE HYDROCHLORIDE 20 MG: 20 CAPSULE ORAL at 05:09

## 2019-01-19 RX ADMIN — METOCLOPRAMIDE HYDROCHLORIDE 10 MG: 10 TABLET ORAL at 11:30

## 2019-01-19 RX ADMIN — MIDODRINE HYDROCHLORIDE 10 MG: 5 TABLET ORAL at 05:07

## 2019-01-19 RX ADMIN — AMIODARONE HYDROCHLORIDE 50 MG: 200 TABLET ORAL at 05:09

## 2019-01-19 RX ADMIN — PYRIDOSTIGMINE BROMIDE 90 MG: 60 TABLET ORAL at 17:23

## 2019-01-19 ASSESSMENT — ENCOUNTER SYMPTOMS
DOUBLE VISION: 0
DIZZINESS: 0
SHORTNESS OF BREATH: 0
COUGH: 0
HEADACHES: 0
CHILLS: 0
FEVER: 0
NAUSEA: 0
NECK PAIN: 0
BLURRED VISION: 0
ABDOMINAL PAIN: 0

## 2019-01-19 ASSESSMENT — PAIN SCALES - GENERAL
PAINLEVEL_OUTOF10: 0
PAINLEVEL_OUTOF10: 0

## 2019-01-19 NOTE — PROGRESS NOTES
Received alert and oriented x 4. Check vitals sign and recorded accordingly and due med given per MAR. Denies pain/discomfort. Productive cough in between Call light within reach. Bed alarm in placed. Needs attended. Will continue to monitor./85   Pulse 64   Temp 36.4 °C (97.6 °F) (Temporal)   Resp 15   Ht 1.829 m (6')   Wt 106.1 kg (233 lb 14.5 oz)   SpO2 98%   BMI 31.72 kg/m² .

## 2019-01-19 NOTE — PROGRESS NOTES
Hospital Medicine Daily Progress Note    Date of Service  1/19/2019    Chief Complaint  53 y.o. male admitted 12/28/2018 with syncope.    Hospital Course    PMH CHD, DM, HTN, HLD, and recent hospitalization for afib s/p cardioversion who had difficult to control orthostatic hypotension at that time. He had right and left cardiac cath at that time and no cardiac cause of orthostatic hypotension was identified. He was discharged on florinef, salt tabs, midodrine.  He presented with syncope while walking and back pain. He was found with L1 compression fracture. Dr Pepper with NSG recommended TLSO and management for osteoporosis. Had had CT head and carotid doppler that was unremarkable. Recent TTE showed normal EF with mild MR and TR. MRI brain showed chronic changes. TSH was normal. He has orthostatic hypotension that's been difficult to correct despite multiple medications.   He has chronic leg swelling that has been worse since lasix stopped for hypotension. Doppler was negative for DVT bilaterally. He was noted to have right hand cellulitis and treated with clindamycin.  He continues to feel dizzy at times even when laying down. Suffered from a syncopal episode while sitting at edge of bed resulting in fall. Code blue was called as patient was unresponsive for several seconds. This followed by a psychogenic non epileptic seizure. Luckily patient did not suffer from any significant trauma from this fall. Upon discussion, patient's xarelto was discontinued as we both felt the risks of bleeding at this time outweigh the benefits due to his recurrent falls. Attempt was made to get droxidopa as an outpatient started in the hospital but this is a specialized medication which requires documents to be completed for initiation.  Recliner wheelchair, larry lift, and bedside commode has been arranged for patient and he understands he will likely need to stay in that position due to his debilitating orthostatic hypotension. He  will also need outpatient arrangement of some orthostatic hypotension specialist.        Interval Problem Update  Pt seen and examined , no acute complains at this time.  He is a high risk fall pt due to his continued severe orthostatic  Pending placement for safe discharge       Consultants/Specialty  None    Code Status  Full    Disposition  TBD       Review of Systems  Review of Systems   Constitutional: Negative for chills and fever.   HENT: Negative for congestion.    Eyes: Negative for blurred vision and double vision.   Respiratory: Negative for cough and shortness of breath.    Cardiovascular: Positive for leg swelling. Negative for chest pain.   Gastrointestinal: Negative for abdominal pain and nausea.   Genitourinary: Negative for dysuria and urgency.   Musculoskeletal: Negative for neck pain.   Skin: Negative for rash.   Neurological: Negative for dizziness (absent today) and headaches.        Physical Exam  Temp:  [36 °C (96.8 °F)-36.8 °C (98.2 °F)] 36 °C (96.8 °F)  Pulse:  [60-68] 66  Resp:  [15-18] 18  BP: (122-184)/(64-89) 130/78  SpO2:  [94 %-98 %] 94 %    Physical Exam   Constitutional: He is oriented to person, place, and time. He appears well-developed and well-nourished.   HENT:   Head: Normocephalic.   Mouth/Throat: Oropharynx is clear and moist.   Eyes: Conjunctivae are normal. Right eye exhibits no discharge. Left eye exhibits no discharge.   Neck: Neck supple. No JVD present.   Cardiovascular: Normal rate and regular rhythm.    No murmur heard.  Pulmonary/Chest: Effort normal and breath sounds normal. No respiratory distress. He has no wheezes.   Abdominal: Soft. He exhibits no distension. There is no tenderness.   Musculoskeletal: He exhibits edema (LE). He exhibits no tenderness.   Neurological: He is alert and oriented to person, place, and time.   Skin: Skin is warm and dry.   Nursing note and vitals reviewed.      Fluids    Intake/Output Summary (Last 24 hours) at 01/19/19 1145  Last  data filed at 01/19/19 0800   Gross per 24 hour   Intake                0 ml   Output             1025 ml   Net            -1025 ml       Laboratory  Recent Labs      01/17/19   0045   WBC  9.2   RBC  5.05   HEMOGLOBIN  13.8*   HEMATOCRIT  43.2   MCV  85.5   MCH  27.3   MCHC  31.9*   RDW  45.3   PLATELETCT  216   MPV  10.3     Recent Labs      01/17/19   0045   SODIUM  136   POTASSIUM  3.9   CHLORIDE  104   CO2  25   GLUCOSE  94   BUN  32*   CREATININE  0.69   CALCIUM  9.2                   Imaging  DX-HIP-UNILATERAL-WITH PELVIS-1 VIEW RIGHT   Final Result      No radiographic evidence of acute traumatic injury right hip.      DX-CHEST-PORTABLE (1 VIEW)   Final Result      Hypoinflation without other evidence for acute cardiopulmonary disease.      MR-BRAIN-W/O   Final Result      1.  MRI of the brain without contrast within normal limits for age with mild white matter changes.   2.  Chronic appearing sinus disease   3.  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 This does not have an aggressive appearance.      US-EXTREMITY VENOUS LOWER BILAT   Final Result      CT-HAND WITH RIGHT   Final Result      1.  Edema in the dorsum of the forearm and hand without a discrete fluid collection to suggest abscess is identified.      US-CAROTID DOPPLER BILAT   Final Result      DX-LUMBAR SPINE-2 OR 3 VIEWS   Final Result         Stable compression deformity in the L1 vertebral body.      CT-CTA CHEST PULMONARY ARTERY W/ RECONS   Final Result         1. No CT evidence of pulmonary embolism.      2. Old right rib fractures.      3. Cholelithiasis. Enlarged spleen with granulomas.      4. Mild superior endplate compression deformity of L1 could be acute. Correlate with point tenderness. No retropulsion. No malalignment.      CT-CSPINE WITHOUT PLUS RECONS   Final Result         1. No acute fracture from C1 through T3 is visualized.         CT-HEAD W/O   Final Result         1. No acute  "intracranial abnormality. No evidence of acute intracranial hemorrhage or mass lesion.               DX-THORACIC SPINE-WITH SWIMMERS VIEW   Final Result      No acute thoracic spine fracture or subluxation identified.      DX-CHEST-PORTABLE (1 VIEW)   Final Result      No acute cardiac or pulmonary abnormality is noted.           Assessment/Plan  * Orthostatic hypotension- (present on admission)   Assessment & Plan    Possibly secondary to dysautonomia secondary to diabetes. Recently admitted for this issue for almost a mohan  Ongoing despite midodrine, florinef, mestinon, reglan, vit B12 and solumedrol, salt tabs, compression stockings  PTOT  Recliner wheelchair, larry, and bedside commode have been ordered  Attempt was made to work with SW to  outpatient prescription of droxidopa so we may start in house but it is unfortunately a very specialized medication. I discussed with Dr. Childress about neurology assistance with starting this medication but he recommended outpatient follow up  Ultimate plan is to arrange for a safe discharge with patient's current condition and severe symptoms which are not being controlled with above measures         Fall from ground level   Assessment & Plan    Fell while sitting at bedside on 1/9. See above     Lumbar compression fracture (HCC)   Assessment & Plan    Pain management as needed.    Neurosurgery recommended TLSO brace.   PT OT recommended home health.    F/u with [facility]     Psychogenic nonepileptic seizure- (present on admission)   Assessment & Plan    Pt had seizure like episode after he fell  Pt well known to me from prior admission. Has been evaluated by neuro and normal EEG even during \"episodes\"  Patient stops seizing while speaking with him  AVOID benzodiazepines     Vitamin B12 deficiency- (present on admission)   Assessment & Plan    Started intramuscular injections       Syncope- (present on admission)   Assessment & Plan    Due to orthostasis.    CT of " the head unremarkable. MRI brain showed chronic changes  Carotid Doppler did not reveal significant stenosis  Recent TTE showed normal EF, mild TR and MR  1/9: syncopal episode while sitting at edge of bed.   Pt has been  advised to not sit up without assistance     Chronic diastolic heart failure (HCC)- (present on admission)   Assessment & Plan    Without current exacerbation.    Chronic lower extremity edema -chronic venous insufficiency     Acute respiratory failure with hypoxia ([facility])   Assessment & Plan    resolved     Venous stasis dermatitis of both lower extremities- (present on admission)   Assessment & Plan    Holding lasix  Compression stockings     Bilateral leg edema- (present on admission)   Assessment & Plan    Appears to have chronic edematous changes.  Likely secondary to venous insufficiency.    Recent TTE showed intact EF  Lasix has been held for orthostatic hypotension  Ultrasound for DVT was study was done and it was negative for DVT  Compression stockings ordered     Paroxysmal A-fib (HCC)- (present on admission)   Assessment & Plan    Continue xarelto and amiodarone  Sinus rhythm  Patient's [id number] is 3 indicating 4.6% of stroke/tia/systemic embolisation per year. HASBLED score is 2 indicating 4.1% bleeding risk. Due to patient's significant orthostatic hypotension and recurrent falls the risks of bleeding outweigh the benefit of anticoagulation at this point of time.  I discussed this in detail with patient and he is agreeable.  Educated patient that if at some point in time his orthostatic hypotension and falls resolve/improve then at that time might be a good time to resume anticoagulation.  I also discussed with him that if he did want to resume anticoagulation that I would recommend warfarin as there is a reversal agent for this.   on aspirin        Essential hypertension- (present on admission)   Assessment & Plan    Lasix held for orthostatic hypotension  Midodrine will result in  supine hypertension but this is necessary to try to optomize orthostatic hypotension     Mixed hyperlipidemia- (present on admission)   Assessment & Plan    Continue current therapy           VTE prophylaxis: SCDs

## 2019-01-19 NOTE — CARE PLAN
Problem: Safety  Goal: Will remain free from falls    Intervention: Implement fall precautions   01/18/19 2118   OTHER   Environmental Precautions Treaded Slipper Socks on Patient;Personal Belongings, Wastebasket, Call Bell etc. in Easy Reach;Report Given to Other Health Care Providers Regarding Fall Risk;Bed in Low Position;Communication Sign for Patients & Families   Bedrails Bedrails Closest to Bathroom Down   Chair/Bed Strip Alarm Yes - Alarm On         Problem: Discharge Barriers/Planning  Goal: Patient's continuum of care needs will be met    Intervention: Collaborate with Transitional Care Team and Interdisciplinary Team to meet discharge needs  Awaiting for placement, pt verbalized understanding.

## 2019-01-20 PROCEDURE — A9270 NON-COVERED ITEM OR SERVICE: HCPCS | Performed by: HOSPITALIST

## 2019-01-20 PROCEDURE — 700102 HCHG RX REV CODE 250 W/ 637 OVERRIDE(OP): Performed by: INTERNAL MEDICINE

## 2019-01-20 PROCEDURE — A9270 NON-COVERED ITEM OR SERVICE: HCPCS | Performed by: INTERNAL MEDICINE

## 2019-01-20 PROCEDURE — 700102 HCHG RX REV CODE 250 W/ 637 OVERRIDE(OP): Performed by: HOSPITALIST

## 2019-01-20 PROCEDURE — G0378 HOSPITAL OBSERVATION PER HR: HCPCS

## 2019-01-20 PROCEDURE — 99224 PR SUBSEQUENT OBSERVATION CARE,LEVEL I: CPT | Performed by: INTERNAL MEDICINE

## 2019-01-20 RX ADMIN — ACETAMINOPHEN 650 MG: 325 TABLET, FILM COATED ORAL at 16:49

## 2019-01-20 RX ADMIN — METOCLOPRAMIDE HYDROCHLORIDE 10 MG: 10 TABLET ORAL at 16:37

## 2019-01-20 RX ADMIN — PYRIDOSTIGMINE BROMIDE 90 MG: 60 TABLET ORAL at 05:56

## 2019-01-20 RX ADMIN — FLUDROCORTISONE ACETATE 0.3 MG: 0.1 TABLET ORAL at 05:57

## 2019-01-20 RX ADMIN — AMIODARONE HYDROCHLORIDE 50 MG: 200 TABLET ORAL at 05:56

## 2019-01-20 RX ADMIN — PYRIDOSTIGMINE BROMIDE 90 MG: 60 TABLET ORAL at 16:36

## 2019-01-20 RX ADMIN — METOCLOPRAMIDE HYDROCHLORIDE 10 MG: 10 TABLET ORAL at 05:57

## 2019-01-20 RX ADMIN — GUAIFENESIN 600 MG: 600 TABLET, EXTENDED RELEASE ORAL at 05:56

## 2019-01-20 RX ADMIN — GUAIFENESIN 600 MG: 600 TABLET, EXTENDED RELEASE ORAL at 16:37

## 2019-01-20 RX ADMIN — NYSTATIN: 100000 POWDER TOPICAL at 16:38

## 2019-01-20 RX ADMIN — ASPIRIN 81 MG 81 MG: 81 TABLET ORAL at 05:56

## 2019-01-20 RX ADMIN — MIDODRINE HYDROCHLORIDE 10 MG: 5 TABLET ORAL at 05:56

## 2019-01-20 RX ADMIN — METOCLOPRAMIDE HYDROCHLORIDE 10 MG: 10 TABLET ORAL at 12:20

## 2019-01-20 RX ADMIN — MULTIPLE VITAMINS W/ MINERALS TAB 1 TABLET: TAB at 05:56

## 2019-01-20 RX ADMIN — MIDODRINE HYDROCHLORIDE 10 MG: 5 TABLET ORAL at 12:20

## 2019-01-20 RX ADMIN — SODIUM CHLORIDE TAB 1 GM 1 G: 1 TAB at 07:29

## 2019-01-20 RX ADMIN — OXYCODONE HYDROCHLORIDE AND ACETAMINOPHEN 500 MG: 500 TABLET ORAL at 05:57

## 2019-01-20 RX ADMIN — PRAVASTATIN SODIUM 20 MG: 20 TABLET ORAL at 16:36

## 2019-01-20 RX ADMIN — Medication 400 MG: at 05:56

## 2019-01-20 RX ADMIN — FLUOXETINE HYDROCHLORIDE 20 MG: 20 CAPSULE ORAL at 05:56

## 2019-01-20 RX ADMIN — SODIUM CHLORIDE TAB 1 GM 1 G: 1 TAB at 16:37

## 2019-01-20 RX ADMIN — PYRIDOSTIGMINE BROMIDE 90 MG: 60 TABLET ORAL at 12:20

## 2019-01-20 RX ADMIN — SODIUM CHLORIDE TAB 1 GM 1 G: 1 TAB at 12:20

## 2019-01-20 RX ADMIN — MIDODRINE HYDROCHLORIDE 10 MG: 5 TABLET ORAL at 16:37

## 2019-01-20 ASSESSMENT — PAIN SCALES - GENERAL
PAINLEVEL_OUTOF10: 0
PAINLEVEL_OUTOF10: 0
PAINLEVEL_OUTOF10: 4
PAINLEVEL_OUTOF10: 0

## 2019-01-20 ASSESSMENT — ENCOUNTER SYMPTOMS
SHORTNESS OF BREATH: 0
DIZZINESS: 0
BLURRED VISION: 0
FEVER: 0
NAUSEA: 0
CHILLS: 0
HEADACHES: 0
DOUBLE VISION: 0
ABDOMINAL PAIN: 0
COUGH: 0
NECK PAIN: 0

## 2019-01-20 NOTE — CARE PLAN
Problem: Safety  Goal: Will remain free from injury    Intervention: Collaborate with Interdisciplinary Team for safe transfer and mobilization techniques  Calling when needed for assistance. Looks good ambulating with walker

## 2019-01-20 NOTE — PROGRESS NOTES
Received alert and oriented x 4. Check vitals sign and recorded accordingly and due med given per MAR. Monitor sign and symptoms of respiratory distress and treatment given accordingly per MAR.Medicated per MAR and reassessed every 2 hours per protocol. Call light within reach. Bed alarm in placed. Denies any pain/discomfort. Needs attended. Will continue to monitor./57   Pulse 62   Temp 36.7 °C (98 °F) (Temporal)   Resp 18   Ht 1.829 m (6')   Wt 111.1 kg (244 lb 14.9 oz)   SpO2 95%   BMI 33.22 kg/m² .

## 2019-01-20 NOTE — PROGRESS NOTES
Hospital Medicine Daily Progress Note    Date of Service  1/20/2019    Chief Complaint  53 y.o. male admitted 12/28/2018 with syncope.    Hospital Course    PMH CHD, DM, HTN, HLD, and recent hospitalization for afib s/p cardioversion who had difficult to control orthostatic hypotension at that time. He had right and left cardiac cath at that time and no cardiac cause of orthostatic hypotension was identified. He was discharged on florinef, salt tabs, midodrine.  He presented with syncope while walking and back pain. He was found with L1 compression fracture. Dr Pepper with NSG recommended TLSO and management for osteoporosis. Had had CT head and carotid doppler that was unremarkable. Recent TTE showed normal EF with mild MR and TR. MRI brain showed chronic changes. TSH was normal. He has orthostatic hypotension that's been difficult to correct despite multiple medications.   He has chronic leg swelling that has been worse since lasix stopped for hypotension. Doppler was negative for DVT bilaterally. He was noted to have right hand cellulitis and treated with clindamycin.  He continues to feel dizzy at times even when laying down. Suffered from a syncopal episode while sitting at edge of bed resulting in fall. Code blue was called as patient was unresponsive for several seconds. This followed by a psychogenic non epileptic seizure. Luckily patient did not suffer from any significant trauma from this fall. Upon discussion, patient's xarelto was discontinued as we both felt the risks of bleeding at this time outweigh the benefits due to his recurrent falls. Attempt was made to get droxidopa as an outpatient started in the hospital but this is a specialized medication which requires documents to be completed for initiation.  Recliner wheelchair, lrary lift, and bedside commode has been arranged for patient and he understands he will likely need to stay in that position due to his debilitating orthostatic hypotension. He  will also need outpatient arrangement of some orthostatic hypotension specialist.        Interval Problem Update  No acute complains at this time.No acute events overnight afebrile   He is a high risk fall pt due to his continued severe orthostatic  Pending placement for safe discharge       Consultants/Specialty  None    Code Status  Full    Disposition  TBD       Review of Systems  Review of Systems   Constitutional: Negative for chills and fever.   HENT: Negative for congestion.    Eyes: Negative for blurred vision and double vision.   Respiratory: Negative for cough and shortness of breath.    Cardiovascular: Positive for leg swelling. Negative for chest pain.   Gastrointestinal: Negative for abdominal pain and nausea.   Genitourinary: Negative for dysuria and urgency.   Musculoskeletal: Negative for neck pain.   Skin: Negative for rash.   Neurological: Negative for dizziness (absent today) and headaches.        Physical Exam  Temp:  [36 °C (96.8 °F)-37.1 °C (98.7 °F)] 36.8 °C (98.3 °F)  Pulse:  [60-80] 61  Resp:  [16-18] 18  BP: (111-163)/(57-83) 163/83  SpO2:  [93 %-96 %] 94 %    Physical Exam   Constitutional: He is oriented to person, place, and time. He appears well-developed and well-nourished.   HENT:   Head: Normocephalic.   Mouth/Throat: Oropharynx is clear and moist.   Eyes: Conjunctivae are normal. Right eye exhibits no discharge. Left eye exhibits no discharge.   Neck: Neck supple. No JVD present.   Cardiovascular: Normal rate and regular rhythm.    No murmur heard.  Pulmonary/Chest: Effort normal and breath sounds normal. No respiratory distress. He has no wheezes.   Abdominal: Soft. He exhibits no distension. There is no tenderness.   Musculoskeletal: He exhibits edema (LE). He exhibits no tenderness.   Neurological: He is alert and oriented to person, place, and time.   Skin: Skin is warm and dry.   Nursing note and vitals reviewed.      Fluids    Intake/Output Summary (Last 24 hours) at 01/20/19  1019  Last data filed at 01/20/19 0956   Gross per 24 hour   Intake              835 ml   Output              940 ml   Net             -105 ml       Laboratory                        Imaging  DX-HIP-UNILATERAL-WITH PELVIS-1 VIEW RIGHT   Final Result      No radiographic evidence of acute traumatic injury right hip.      DX-CHEST-PORTABLE (1 VIEW)   Final Result      Hypoinflation without other evidence for acute cardiopulmonary disease.      MR-BRAIN-W/O   Final Result      1.  MRI of the brain without contrast within normal limits for age with mild white matter changes.   2.  Chronic appearing sinus disease   3.  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 This does not have an aggressive appearance.      US-EXTREMITY VENOUS LOWER BILAT   Final Result      CT-HAND WITH RIGHT   Final Result      1.  Edema in the dorsum of the forearm and hand without a discrete fluid collection to suggest abscess is identified.      US-CAROTID DOPPLER BILAT   Final Result      DX-LUMBAR SPINE-2 OR 3 VIEWS   Final Result         Stable compression deformity in the L1 vertebral body.      CT-CTA CHEST PULMONARY ARTERY W/ RECONS   Final Result         1. No CT evidence of pulmonary embolism.      2. Old right rib fractures.      3. Cholelithiasis. Enlarged spleen with granulomas.      4. Mild superior endplate compression deformity of L1 could be acute. Correlate with point tenderness. No retropulsion. No malalignment.      CT-CSPINE WITHOUT PLUS RECONS   Final Result         1. No acute fracture from C1 through T3 is visualized.         CT-HEAD W/O   Final Result         1. No acute intracranial abnormality. No evidence of acute intracranial hemorrhage or mass lesion.               DX-THORACIC SPINE-WITH SWIMMERS VIEW   Final Result      No acute thoracic spine fracture or subluxation identified.      DX-CHEST-PORTABLE (1 VIEW)   Final Result      No acute cardiac or pulmonary abnormality  "is noted.           Assessment/Plan  * Orthostatic hypotension- (present on admission)   Assessment & Plan    Possibly secondary to dysautonomia secondary to diabetes. Recently admitted for this issue for almost a mohan  Ongoing despite midodrine, florinef, mestinon, reglan, vit B12 and solumedrol, salt tabs, compression stockings  PTOT  Recliner wheelchair, larry, and bedside commode have been ordered  Attempt was made to work with SW to  outpatient prescription of droxidopa so we may start in house but it is unfortunately a very specialized medication. I discussed with Dr. Childress about neurology assistance with starting this medication but he recommended outpatient follow up  Ultimate plan is to arrange for a safe discharge with patient's current condition and severe symptoms which are not being controlled with above measures         Fall from ground level   Assessment & Plan    Fell while sitting at bedside on 1/9. See above     Lumbar compression fracture (HCC)   Assessment & Plan    Pain management as needed.    Neurosurgery recommended TLSO brace.   PT OT recommended home health.    F/u with [facility]     Psychogenic nonepileptic seizure- (present on admission)   Assessment & Plan    Pt had seizure like episode after he fell  Pt well known to me from prior admission. Has been evaluated by neuro and normal EEG even during \"episodes\"  Patient stops seizing while speaking with him  AVOID benzodiazepines     Vitamin B12 deficiency- (present on admission)   Assessment & Plan    Started intramuscular injections       Syncope- (present on admission)   Assessment & Plan    Due to orthostasis.    CT of the head unremarkable. MRI brain showed chronic changes  Carotid Doppler did not reveal significant stenosis  Recent TTE showed normal EF, mild TR and MR  1/9: syncopal episode while sitting at edge of bed.   Pt has been  advised to not sit up without assistance     Chronic diastolic heart failure (HCC)- (present " on admission)   Assessment & Plan    Without current exacerbation.    Chronic lower extremity edema -chronic venous insufficiency     Acute respiratory failure with hypoxia (HCC)   Assessment & Plan    resolved     Venous stasis dermatitis of both lower extremities- (present on admission)   Assessment & Plan    Holding lasix  Compression stockings     Bilateral leg edema- (present on admission)   Assessment & Plan    Appears to have chronic edematous changes.  Likely secondary to venous insufficiency.    Recent TTE showed intact EF  Lasix has been held for orthostatic hypotension  Ultrasound for DVT was study was done and it was negative for DVT  Compression stockings ordered     Paroxysmal A-fib (HCC)- (present on admission)   Assessment & Plan    Continue xarelto and amiodarone  Sinus rhythm  Patient's [id number] is 3 indicating 4.6% of stroke/tia/systemic embolisation per year. HASBLED score is 2 indicating 4.1% bleeding risk. Due to patient's significant orthostatic hypotension and recurrent falls the risks of bleeding outweigh the benefit of anticoagulation at this point of time.  I discussed this in detail with patient and he is agreeable.  Educated patient that if at some point in time his orthostatic hypotension and falls resolve/improve then at that time might be a good time to resume anticoagulation.  I also discussed with him that if he did want to resume anticoagulation that I would recommend warfarin as there is a reversal agent for this.   on aspirin        Essential hypertension- (present on admission)   Assessment & Plan    Lasix held for orthostatic hypotension  Midodrine will result in supine hypertension but this is necessary to try to optomize orthostatic hypotension     Mixed hyperlipidemia- (present on admission)   Assessment & Plan    Continue current therapy           VTE prophylaxis: SCDs

## 2019-01-20 NOTE — CARE PLAN
Problem: Safety  Goal: Will remain free from falls    Intervention: Implement fall precautions   01/19/19 2201   OTHER   Environmental Precautions Treaded Slipper Socks on Patient;Personal Belongings, Wastebasket, Call Bell etc. in Easy Reach;Report Given to Other Health Care Providers Regarding Fall Risk;Bed in Low Position;Communication Sign for Patients & Families   Chair/Bed Strip Alarm Yes - Alarm On         Problem: Discharge Barriers/Planning  Goal: Patient's continuum of care needs will be met    Intervention: Collaborate with Transitional Care Team and Interdisciplinary Team to meet discharge needs  Still awaiting placement.

## 2019-01-20 NOTE — PROGRESS NOTES
Assumed care at 0700, report received from NOC RN.  Pt A&O x 4, states pain is in back but declines any medication or heatpack. Bed locked, 2 rails up, bed in lowest position. Call light in place, belongings at bedside, no needs at this time and hourly rounding in place.  Pt up to chair after breakfast, SBA with FWW, pt called appropriately. Pt has steady gait and no complaints of dizziness.

## 2019-01-21 PROCEDURE — A9270 NON-COVERED ITEM OR SERVICE: HCPCS | Performed by: INTERNAL MEDICINE

## 2019-01-21 PROCEDURE — 97116 GAIT TRAINING THERAPY: CPT

## 2019-01-21 PROCEDURE — G0378 HOSPITAL OBSERVATION PER HR: HCPCS

## 2019-01-21 PROCEDURE — 700102 HCHG RX REV CODE 250 W/ 637 OVERRIDE(OP): Performed by: HOSPITALIST

## 2019-01-21 PROCEDURE — 97110 THERAPEUTIC EXERCISES: CPT

## 2019-01-21 PROCEDURE — A9270 NON-COVERED ITEM OR SERVICE: HCPCS | Performed by: HOSPITALIST

## 2019-01-21 PROCEDURE — 700102 HCHG RX REV CODE 250 W/ 637 OVERRIDE(OP): Performed by: INTERNAL MEDICINE

## 2019-01-21 PROCEDURE — 99224 PR SUBSEQUENT OBSERVATION CARE,LEVEL I: CPT | Performed by: INTERNAL MEDICINE

## 2019-01-21 RX ADMIN — Medication 400 MG: at 06:10

## 2019-01-21 RX ADMIN — FLUOXETINE HYDROCHLORIDE 20 MG: 20 CAPSULE ORAL at 06:11

## 2019-01-21 RX ADMIN — GUAIFENESIN 600 MG: 600 TABLET, EXTENDED RELEASE ORAL at 16:48

## 2019-01-21 RX ADMIN — MIDODRINE HYDROCHLORIDE 10 MG: 5 TABLET ORAL at 11:53

## 2019-01-21 RX ADMIN — SENNOSIDES AND DOCUSATE SODIUM 2 TABLET: 8.6; 5 TABLET ORAL at 06:09

## 2019-01-21 RX ADMIN — SODIUM CHLORIDE TAB 1 GM 1 G: 1 TAB at 07:10

## 2019-01-21 RX ADMIN — PRAVASTATIN SODIUM 20 MG: 20 TABLET ORAL at 16:48

## 2019-01-21 RX ADMIN — PYRIDOSTIGMINE BROMIDE 90 MG: 60 TABLET ORAL at 16:48

## 2019-01-21 RX ADMIN — PYRIDOSTIGMINE BROMIDE 90 MG: 60 TABLET ORAL at 11:53

## 2019-01-21 RX ADMIN — MULTIPLE VITAMINS W/ MINERALS TAB 1 TABLET: TAB at 06:09

## 2019-01-21 RX ADMIN — PYRIDOSTIGMINE BROMIDE 90 MG: 60 TABLET ORAL at 06:11

## 2019-01-21 RX ADMIN — SODIUM CHLORIDE TAB 1 GM 1 G: 1 TAB at 16:48

## 2019-01-21 RX ADMIN — MIDODRINE HYDROCHLORIDE 10 MG: 5 TABLET ORAL at 06:11

## 2019-01-21 RX ADMIN — SODIUM CHLORIDE TAB 1 GM 1 G: 1 TAB at 11:53

## 2019-01-21 RX ADMIN — FLUDROCORTISONE ACETATE 0.3 MG: 0.1 TABLET ORAL at 06:10

## 2019-01-21 RX ADMIN — NYSTATIN: 100000 POWDER TOPICAL at 16:49

## 2019-01-21 RX ADMIN — OXYCODONE HYDROCHLORIDE AND ACETAMINOPHEN 500 MG: 500 TABLET ORAL at 06:10

## 2019-01-21 RX ADMIN — METOCLOPRAMIDE HYDROCHLORIDE 10 MG: 10 TABLET ORAL at 06:10

## 2019-01-21 RX ADMIN — ASPIRIN 81 MG 81 MG: 81 TABLET ORAL at 06:09

## 2019-01-21 RX ADMIN — AMIODARONE HYDROCHLORIDE 50 MG: 200 TABLET ORAL at 06:09

## 2019-01-21 RX ADMIN — NYSTATIN: 100000 POWDER TOPICAL at 06:10

## 2019-01-21 RX ADMIN — ACETAMINOPHEN 650 MG: 325 TABLET, FILM COATED ORAL at 07:10

## 2019-01-21 RX ADMIN — METOCLOPRAMIDE HYDROCHLORIDE 10 MG: 10 TABLET ORAL at 16:49

## 2019-01-21 RX ADMIN — GUAIFENESIN 600 MG: 600 TABLET, EXTENDED RELEASE ORAL at 06:10

## 2019-01-21 RX ADMIN — METOCLOPRAMIDE HYDROCHLORIDE 10 MG: 10 TABLET ORAL at 11:53

## 2019-01-21 RX ADMIN — ACETAMINOPHEN 650 MG: 325 TABLET, FILM COATED ORAL at 16:53

## 2019-01-21 RX ADMIN — MIDODRINE HYDROCHLORIDE 10 MG: 5 TABLET ORAL at 16:48

## 2019-01-21 ASSESSMENT — ENCOUNTER SYMPTOMS
BLURRED VISION: 0
DIZZINESS: 0
SHORTNESS OF BREATH: 0
DOUBLE VISION: 0
CHILLS: 0
NECK PAIN: 0
HEADACHES: 0
COUGH: 0
ABDOMINAL PAIN: 0
NAUSEA: 0
FEVER: 0

## 2019-01-21 ASSESSMENT — GAIT ASSESSMENTS
DEVIATION: BRADYKINETIC;SHUFFLED GAIT
GAIT LEVEL OF ASSIST: CONTACT GUARD ASSIST
ASSISTIVE DEVICE: FRONT WHEEL WALKER
DISTANCE (FEET): 100

## 2019-01-21 ASSESSMENT — PAIN SCALES - GENERAL
PAINLEVEL_OUTOF10: 8
PAINLEVEL_OUTOF10: 4
PAINLEVEL_OUTOF10: 8

## 2019-01-21 ASSESSMENT — COGNITIVE AND FUNCTIONAL STATUS - GENERAL
MOVING FROM LYING ON BACK TO SITTING ON SIDE OF FLAT BED: A LITTLE
CLIMB 3 TO 5 STEPS WITH RAILING: A LOT
STANDING UP FROM CHAIR USING ARMS: A LITTLE
SUGGESTED CMS G CODE MODIFIER MOBILITY: CK
WALKING IN HOSPITAL ROOM: A LITTLE
MOBILITY SCORE: 19

## 2019-01-21 NOTE — PROGRESS NOTES
Assumed care of patient at 1900. Patient A&Ox4. Denies pain and is sleeping intermittently. Patient denies dizziness or lightheadedness on assessment. Safety precautions in place, call light/belongings at bedside. Patient denies need to use restroom. Patient needs met.

## 2019-01-21 NOTE — PROGRESS NOTES
Assumed care at 0700, report received from NOC RN.  Pt A&O x 4, states pain is 8/10--medication given per MAR. Bed locked, 2 rails up, bed in lowest position. Call light in place, belongings at bedside, no needs at this time and hourly rounding in place.  Pt encouraged to get OOB for meals today and ambulate with assistance.

## 2019-01-21 NOTE — THERAPY
"Physical Therapy Treatment completed.   Bed Mobility:  Supine to Sit:  (Up in chair)  Transfers: Sit to Stand: Stand by Assist  Gait: Level Of Assist: Contact Guard Assist with Front-Wheel Walker       Plan of Care: Will benefit from Physical Therapy 3 times per week and Plan to complete next treatment by Wednesday 1/23  Discharge Recommendations: Equipment: Will Continue to Assess for Equipment Needs. Post-acute therapy Discharge to home with outpatient or home health for additional skilled therapy services.    Pt seen today for PT intervention to address deficits related to prolonged hospital stay, generalized weakness and pain. Pt reports he continues to have back pain 8/10. BP taken prior to standing, 146/67. Pt agreeable to ambulate and stated he did ambulate with RN earlier, however, unable to return to room due to feeling symptomatic. Pt ambulated short distance, approximately 25 feet with FWW prior to becoming symptomatic. BP taken in sitting, 159/75. Pt then completed an additional 100 feet with CGA for safety given history of orthostatic hypotension. Pt was asymptomatic and made it back to room. BP once back in room 134/68. Pt then educated on and performed seated therapeutic exercises including seated marches, LAQ and resisted hip abduction. Pt also educated on supine therapeutic exercises. Pt's activity tolerance improving today with decreased symptoms with ambulation. Will continue to follow for PT intervention    See \"Rehab Therapy-Acute\" Patient Summary Report for complete documentation.       "

## 2019-01-21 NOTE — PROGRESS NOTES
Hospital Medicine Daily Progress Note    Date of Service  1/21/2019    Chief Complaint  53 y.o. male admitted 12/28/2018 with syncope.    Hospital Course    PMH CHD, DM, HTN, HLD, and recent hospitalization for afib s/p cardioversion who had difficult to control orthostatic hypotension at that time. He had right and left cardiac cath at that time and no cardiac cause of orthostatic hypotension was identified. He was discharged on florinef, salt tabs, midodrine.  He presented with syncope while walking and back pain. He was found with L1 compression fracture. Dr Pepper with NSG recommended TLSO and management for osteoporosis. Had had CT head and carotid doppler that was unremarkable. Recent TTE showed normal EF with mild MR and TR. MRI brain showed chronic changes. TSH was normal. He has orthostatic hypotension that's been difficult to correct despite multiple medications.   He has chronic leg swelling that has been worse since lasix stopped for hypotension. Doppler was negative for DVT bilaterally. He was noted to have right hand cellulitis and treated with clindamycin.  He continues to feel dizzy at times even when laying down. Suffered from a syncopal episode while sitting at edge of bed resulting in fall. Code blue was called as patient was unresponsive for several seconds. This followed by a psychogenic non epileptic seizure. Luckily patient did not suffer from any significant trauma from this fall. Upon discussion, patient's xarelto was discontinued as we both felt the risks of bleeding at this time outweigh the benefits due to his recurrent falls. Attempt was made to get droxidopa as an outpatient started in the hospital but this is a specialized medication which requires documents to be completed for initiation.  Recliner wheelchair, larry lift, and bedside commode has been arranged for patient and he understands he will likely need to stay in that position due to his debilitating orthostatic hypotension. He  will also need outpatient arrangement of some orthostatic hypotension specialist.        Interval Problem Update  Pt seen and examined No acute events overnight afebrile   He is a high risk fall pt due to his continued severe orthostatic  Pending placement for safe discharge       Consultants/Specialty  None    Code Status  Full    Disposition  TBD       Review of Systems  Review of Systems   Constitutional: Negative for chills and fever.   HENT: Negative for congestion.    Eyes: Negative for blurred vision and double vision.   Respiratory: Negative for cough and shortness of breath.    Cardiovascular: Positive for leg swelling. Negative for chest pain.   Gastrointestinal: Negative for abdominal pain and nausea.   Genitourinary: Negative for dysuria and urgency.   Musculoskeletal: Negative for neck pain.   Skin: Negative for rash.   Neurological: Negative for dizziness (absent today) and headaches.        Physical Exam  Temp:  [36 °C (96.8 °F)-36.4 °C (97.6 °F)] 36.3 °C (97.4 °F)  Pulse:  [61-69] 69  Resp:  [17-18] 18  BP: (142-175)/(66-96) 175/96  SpO2:  [90 %-95 %] 95 %    Physical Exam   Constitutional: He is oriented to person, place, and time. He appears well-developed and well-nourished.   HENT:   Head: Normocephalic.   Mouth/Throat: Oropharynx is clear and moist.   Eyes: Conjunctivae are normal. Right eye exhibits no discharge. Left eye exhibits no discharge.   Neck: Neck supple. No JVD present.   Cardiovascular: Normal rate and regular rhythm.    No murmur heard.  Pulmonary/Chest: Effort normal and breath sounds normal. No respiratory distress. He has no wheezes.   Abdominal: Soft. He exhibits no distension. There is no tenderness.   Musculoskeletal: He exhibits edema (LE). He exhibits no tenderness.   Neurological: He is alert and oriented to person, place, and time.   Skin: Skin is warm and dry.   Nursing note and vitals reviewed.      Fluids    Intake/Output Summary (Last 24 hours) at 01/21/19 1029  Last  data filed at 01/21/19 0600   Gross per 24 hour   Intake              240 ml   Output             1800 ml   Net            -1560 ml       Laboratory                        Imaging  DX-HIP-UNILATERAL-WITH PELVIS-1 VIEW RIGHT   Final Result      No radiographic evidence of acute traumatic injury right hip.      DX-CHEST-PORTABLE (1 VIEW)   Final Result      Hypoinflation without other evidence for acute cardiopulmonary disease.      MR-BRAIN-W/O   Final Result      1.  MRI of the brain without contrast within normal limits for age with mild white matter changes.   2.  Chronic appearing sinus disease   3.  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 This does not have an aggressive appearance.      US-EXTREMITY VENOUS LOWER BILAT   Final Result      CT-HAND WITH RIGHT   Final Result      1.  Edema in the dorsum of the forearm and hand without a discrete fluid collection to suggest abscess is identified.      US-CAROTID DOPPLER BILAT   Final Result      DX-LUMBAR SPINE-2 OR 3 VIEWS   Final Result         Stable compression deformity in the L1 vertebral body.      CT-CTA CHEST PULMONARY ARTERY W/ RECONS   Final Result         1. No CT evidence of pulmonary embolism.      2. Old right rib fractures.      3. Cholelithiasis. Enlarged spleen with granulomas.      4. Mild superior endplate compression deformity of L1 could be acute. Correlate with point tenderness. No retropulsion. No malalignment.      CT-CSPINE WITHOUT PLUS RECONS   Final Result         1. No acute fracture from C1 through T3 is visualized.         CT-HEAD W/O   Final Result         1. No acute intracranial abnormality. No evidence of acute intracranial hemorrhage or mass lesion.               DX-THORACIC SPINE-WITH SWIMMERS VIEW   Final Result      No acute thoracic spine fracture or subluxation identified.      DX-CHEST-PORTABLE (1 VIEW)   Final Result      No acute cardiac or pulmonary abnormality is noted.  "          Assessment/Plan  * Orthostatic hypotension- (present on admission)   Assessment & Plan    Possibly secondary to dysautonomia secondary to diabetes. Recently admitted for this issue for almost a mohan  Ongoing despite midodrine, florinef, mestinon, reglan, vit B12 and solumedrol, salt tabs, compression stockings  PTOT  Recliner wheelchair, larry, and bedside commode have been ordered  Attempt was made to work with SW to  outpatient prescription of droxidopa so we may start in house but it is unfortunately a very specialized medication. I discussed with Dr. Childress about neurology assistance with starting this medication but he recommended outpatient follow up  Ultimate plan is to arrange for a safe discharge with patient's current condition and severe symptoms which are not being controlled with above measures         Fall from ground level   Assessment & Plan    Fell while sitting at bedside on 1/9. See above     Lumbar compression fracture (HCC)   Assessment & Plan    Pain management as needed.    Neurosurgery recommended TLSO brace.   PT OT recommended home health.    F/u with [facility]     Psychogenic nonepileptic seizure- (present on admission)   Assessment & Plan    Pt had seizure like episode after he fell  Pt well known to me from prior admission. Has been evaluated by neuro and normal EEG even during \"episodes\"  Patient stops seizing while speaking with him  AVOID benzodiazepines     Vitamin B12 deficiency- (present on admission)   Assessment & Plan    Started intramuscular injections       Syncope- (present on admission)   Assessment & Plan    Due to orthostasis.    CT of the head unremarkable. MRI brain showed chronic changes  Carotid Doppler did not reveal significant stenosis  Recent TTE showed normal EF, mild TR and MR  1/9: syncopal episode while sitting at edge of bed.   Pt has been  advised to not sit up without assistance     Chronic diastolic heart failure (HCC)- (present on " admission)   Assessment & Plan    Without current exacerbation.    Chronic lower extremity edema -chronic venous insufficiency     Acute respiratory failure with hypoxia (HCC)   Assessment & Plan    resolved     Venous stasis dermatitis of both lower extremities- (present on admission)   Assessment & Plan    Holding lasix  Compression stockings     Bilateral leg edema- (present on admission)   Assessment & Plan    Appears to have chronic edematous changes.  Likely secondary to venous insufficiency.    Recent TTE showed intact EF  Lasix has been held for orthostatic hypotension  Ultrasound for DVT was study was done and it was negative for DVT  Compression stockings ordered     Paroxysmal A-fib (HCC)- (present on admission)   Assessment & Plan    Continue xarelto and amiodarone  Sinus rhythm  Patient's [id number] is 3 indicating 4.6% of stroke/tia/systemic embolisation per year. HASBLED score is 2 indicating 4.1% bleeding risk. Due to patient's significant orthostatic hypotension and recurrent falls the risks of bleeding outweigh the benefit of anticoagulation at this point of time.  I discussed this in detail with patient and he is agreeable.  Educated patient that if at some point in time his orthostatic hypotension and falls resolve/improve then at that time might be a good time to resume anticoagulation.  I also discussed with him that if he did want to resume anticoagulation that I would recommend warfarin as there is a reversal agent for this.   on aspirin        Essential hypertension- (present on admission)   Assessment & Plan    Lasix held for orthostatic hypotension  Midodrine will result in supine hypertension but this is necessary to try to optomize orthostatic hypotension     Mixed hyperlipidemia- (present on admission)   Assessment & Plan    Continue current therapy           VTE prophylaxis: SCDs

## 2019-01-21 NOTE — CARE PLAN
Problem: Safety  Goal: Will remain free from falls    Intervention: Implement fall precautions   01/20/19 3644   OTHER   Environmental Precautions Treaded Slipper Socks on Patient;Personal Belongings, Wastebasket, Call Bell etc. in Easy Reach;Transferred to Stronger Side;Report Given to Other Health Care Providers Regarding Fall Risk   Chair/Bed Strip Alarm Yes - Alarm On         Problem: Discharge Barriers/Planning  Goal: Patient's continuum of care needs will be met    Intervention: Collaborate with Transitional Care Team and Interdisciplinary Team to meet discharge needs  Awaiting placement. Patient verbalizes understanding regarding barriers to discharge.

## 2019-01-21 NOTE — CARE PLAN
Problem: Communication  Goal: The ability to communicate needs accurately and effectively will improve  Outcome: PROGRESSING AS EXPECTED  Pt uses call light appropriately to communicate needs, hourly rounding in place as well.     Problem: Mobility  Goal: Risk for activity intolerance will decrease  Outcome: PROGRESSING AS EXPECTED  Pt has no complaints of dizziness or weakness when ambulating to the bathroom. Encouraged pt to ambulate the hallways with assistance today.

## 2019-01-22 PROCEDURE — 97110 THERAPEUTIC EXERCISES: CPT

## 2019-01-22 PROCEDURE — A9270 NON-COVERED ITEM OR SERVICE: HCPCS | Performed by: HOSPITALIST

## 2019-01-22 PROCEDURE — 700102 HCHG RX REV CODE 250 W/ 637 OVERRIDE(OP): Performed by: HOSPITALIST

## 2019-01-22 PROCEDURE — A9270 NON-COVERED ITEM OR SERVICE: HCPCS | Performed by: INTERNAL MEDICINE

## 2019-01-22 PROCEDURE — 700102 HCHG RX REV CODE 250 W/ 637 OVERRIDE(OP): Performed by: INTERNAL MEDICINE

## 2019-01-22 PROCEDURE — 99225 PR SUBSEQUENT OBSERVATION CARE,LEVEL II: CPT | Performed by: HOSPITALIST

## 2019-01-22 PROCEDURE — 97535 SELF CARE MNGMENT TRAINING: CPT

## 2019-01-22 PROCEDURE — G0378 HOSPITAL OBSERVATION PER HR: HCPCS

## 2019-01-22 RX ADMIN — ACETAMINOPHEN 650 MG: 325 TABLET, FILM COATED ORAL at 08:42

## 2019-01-22 RX ADMIN — ASPIRIN 81 MG 81 MG: 81 TABLET ORAL at 06:35

## 2019-01-22 RX ADMIN — GUAIFENESIN 600 MG: 600 TABLET, EXTENDED RELEASE ORAL at 06:34

## 2019-01-22 RX ADMIN — PYRIDOSTIGMINE BROMIDE 90 MG: 60 TABLET ORAL at 17:46

## 2019-01-22 RX ADMIN — PYRIDOSTIGMINE BROMIDE 90 MG: 60 TABLET ORAL at 11:51

## 2019-01-22 RX ADMIN — MULTIPLE VITAMINS W/ MINERALS TAB 1 TABLET: TAB at 06:34

## 2019-01-22 RX ADMIN — MIDODRINE HYDROCHLORIDE 10 MG: 5 TABLET ORAL at 11:48

## 2019-01-22 RX ADMIN — ACETAMINOPHEN 650 MG: 325 TABLET, FILM COATED ORAL at 17:55

## 2019-01-22 RX ADMIN — METOCLOPRAMIDE HYDROCHLORIDE 10 MG: 10 TABLET ORAL at 06:34

## 2019-01-22 RX ADMIN — SODIUM CHLORIDE TAB 1 GM 1 G: 1 TAB at 11:48

## 2019-01-22 RX ADMIN — FLUOXETINE HYDROCHLORIDE 20 MG: 20 CAPSULE ORAL at 06:34

## 2019-01-22 RX ADMIN — SENNOSIDES AND DOCUSATE SODIUM 2 TABLET: 8.6; 5 TABLET ORAL at 06:34

## 2019-01-22 RX ADMIN — MIDODRINE HYDROCHLORIDE 10 MG: 5 TABLET ORAL at 17:46

## 2019-01-22 RX ADMIN — MIDODRINE HYDROCHLORIDE 10 MG: 5 TABLET ORAL at 06:34

## 2019-01-22 RX ADMIN — FLUDROCORTISONE ACETATE 0.3 MG: 0.1 TABLET ORAL at 06:35

## 2019-01-22 RX ADMIN — GUAIFENESIN 600 MG: 600 TABLET, EXTENDED RELEASE ORAL at 17:46

## 2019-01-22 RX ADMIN — AMIODARONE HYDROCHLORIDE 50 MG: 200 TABLET ORAL at 06:35

## 2019-01-22 RX ADMIN — OXYCODONE HYDROCHLORIDE AND ACETAMINOPHEN 500 MG: 500 TABLET ORAL at 06:36

## 2019-01-22 RX ADMIN — SODIUM CHLORIDE TAB 1 GM 1 G: 1 TAB at 17:46

## 2019-01-22 RX ADMIN — METOCLOPRAMIDE HYDROCHLORIDE 10 MG: 10 TABLET ORAL at 11:48

## 2019-01-22 RX ADMIN — Medication 400 MG: at 06:34

## 2019-01-22 RX ADMIN — PRAVASTATIN SODIUM 20 MG: 20 TABLET ORAL at 17:46

## 2019-01-22 RX ADMIN — METOCLOPRAMIDE HYDROCHLORIDE 10 MG: 10 TABLET ORAL at 17:46

## 2019-01-22 RX ADMIN — SODIUM CHLORIDE TAB 1 GM 1 G: 1 TAB at 08:37

## 2019-01-22 RX ADMIN — NYSTATIN: 100000 POWDER TOPICAL at 17:46

## 2019-01-22 RX ADMIN — PYRIDOSTIGMINE BROMIDE 90 MG: 60 TABLET ORAL at 06:35

## 2019-01-22 ASSESSMENT — COGNITIVE AND FUNCTIONAL STATUS - GENERAL
DRESSING REGULAR LOWER BODY CLOTHING: A LITTLE
SUGGESTED CMS G CODE MODIFIER DAILY ACTIVITY: CJ
DAILY ACTIVITIY SCORE: 21
TOILETING: A LITTLE
HELP NEEDED FOR BATHING: A LITTLE

## 2019-01-22 ASSESSMENT — ENCOUNTER SYMPTOMS
CHILLS: 0
SHORTNESS OF BREATH: 0
FEVER: 0
DOUBLE VISION: 0
BACK PAIN: 1
BLURRED VISION: 0
NAUSEA: 0
STRIDOR: 0
HEADACHES: 1
NERVOUS/ANXIOUS: 0
SORE THROAT: 0
NECK PAIN: 0
COUGH: 0
ABDOMINAL PAIN: 0

## 2019-01-22 ASSESSMENT — PAIN SCALES - GENERAL
PAINLEVEL_OUTOF10: 9
PAINLEVEL_OUTOF10: 4
PAINLEVEL_OUTOF10: 4
PAINLEVEL_OUTOF10: 5
PAINLEVEL_OUTOF10: 7

## 2019-01-22 NOTE — CARE PLAN
Problem: Discharge Barriers/Planning  Goal: Patient's continuum of care needs will be met    Intervention: Assess potential discharge barriers on admission and throughout hospital stay  Placement pending, SW assisting with discharge planning.       Problem: Mobility  Goal: Risk for activity intolerance will decrease    Intervention: Encourage patient to increase activity level in collaboration with Interdisciplinary Team  Patient sitting up for meals in chair. Ambulating halls with FWW. Tolerated well.

## 2019-01-22 NOTE — PROGRESS NOTES
Assumed patient care at 0700. Received report from night shift. Assessment completed. A&Ox 4. C/o of 7/10 head and back pain, medicated per MAR. Orthostatics bp done, positive-see flowsheet, pt denies dizziness. High fall risk, precautions in place; alarm on, treaded socks in place, personal possessions and call light placed within reach, equipment away from bedside.  POC discussed with pt, communication board updated. Patient denies any additional needs at this time.    Statement Selected

## 2019-01-22 NOTE — PROGRESS NOTES
Assumed care of patient at 1900. Patient A&Ox4. Denies pain and is sleeping intermittently. Patient denies dizziness, lightheadedness or headache on assessment. Safety precautions in place, call light/belongings at bedside. Patient denies need to use restroom. Patient needs met.

## 2019-01-22 NOTE — PROGRESS NOTES
Hospital Medicine Daily Progress Note    Date of Service  1/22/2019    Chief Complaint  53 y.o. male admitted 12/28/2018 with syncope.    Hospital Course      PMHx CHD, DM, HTN, HLD, and recent hospitalization for afib s/p cardioversion who had difficult to control orthostatic hypotension at that time. He had right and left cardiac cath at that time and no cardiac cause of orthostatic hypotension was identified. He was discharged on florinef, salt tabs, midodrine.  He presented with syncope while walking and back pain. He was found with L1 compression fracture. Dr Pepper with NSG recommended TLSO and management for osteoporosis. Had had CT head and carotid doppler that was unremarkable. Recent TTE showed normal EF with mild MR and TR. MRI brain showed chronic changes. TSH was normal. He has orthostatic hypotension that's been difficult to correct despite multiple medications.   He has chronic leg swelling that has been worse since lasix stopped for hypotension. Doppler was negative for DVT bilaterally. He was noted to have right hand cellulitis and treated with clindamycin.  He continues to feel dizzy at times even when laying down. Suffered from a syncopal episode while sitting at edge of bed resulting in fall. Code blue was called as patient was unresponsive for several seconds. This followed by a psychogenic non epileptic seizure. Luckily patient did not suffer from any significant trauma from this fall. Upon discussion, patient's xarelto was discontinued as we both felt the risks of bleeding at this time outweigh the benefits due to his recurrent falls. Attempt was made to get droxidopa as an outpatient started in the hospital but this is a specialized medication which requires documents to be completed for initiation.  Recliner wheelchair, larry lift, and bedside commode has been arranged for patient and he understands he will likely need to stay in that position due to his debilitating orthostatic hypotension.  He will also need outpatient arrangement of some orthostatic hypotension specialist.          Interval Problem Update  Heart rate 53-69, saturating well on room air  Blood pressure 100-183/71-91  Patient still have significant orthostatic changes in his blood pressure, however denies dizziness today.  Checking BMP as patient has been on fludrocortisone   Emphasized to nurse, discussed with charge nurse, patient needs daily orthostatics and ambulation daily with caution.   Case management working on placement with limited success.  Did not qualify to [facility].  Can get equipment to homeless shelter.  Discussed with patient, nurse and with multidisciplinary team during including , pharmacist and charge nurse.     Consultants/Specialty  None    Code Status  Full    Disposition  TBD     Review of Systems  Review of Systems   Constitutional: Negative for chills and fever.   HENT: Negative for congestion and sore throat.    Eyes: Negative for blurred vision and double vision.   Respiratory: Negative for cough, shortness of breath and stridor.    Cardiovascular: Positive for leg swelling. Negative for chest pain.   Gastrointestinal: Negative for abdominal pain and nausea.   Genitourinary: Negative for dysuria and urgency.   Musculoskeletal: Positive for back pain. Negative for neck pain.   Skin: Negative for rash.   Neurological: Positive for headaches.   Psychiatric/Behavioral: The patient is not nervous/anxious.         Physical Exam  Temp:  [36.2 °C (97.2 °F)-36.9 °C (98.4 °F)] 36.2 °C (97.2 °F)  Pulse:  [53-82] 82  Resp:  [17] 17  BP: (100-183)/(52-91) 106/52  SpO2:  [94 %-96 %] 94 %    Physical Exam   Constitutional: He is oriented to person, place, and time. He appears well-developed and well-nourished.   HENT:   Head: Normocephalic.   Mouth/Throat: Oropharynx is clear and moist.   Eyes: Conjunctivae are normal. Right eye exhibits no discharge. Left eye exhibits no discharge.   Neck: Neck supple. No JVD  present.   Cardiovascular: Normal rate and regular rhythm.    No murmur heard.  Pulmonary/Chest: Effort normal and breath sounds normal. No respiratory distress. He has no wheezes.   Abdominal: Soft. He exhibits no distension. There is no tenderness.   Musculoskeletal: He exhibits edema (Marked B/L LE edema ). He exhibits no tenderness.   Neurological: He is alert and oriented to person, place, and time.   Skin: Skin is warm and dry.   Psychiatric: He has a normal mood and affect.   Nursing note and vitals reviewed.    Fluids    Intake/Output Summary (Last 24 hours) at 01/22/19 2135  Last data filed at 01/22/19 1410   Gross per 24 hour   Intake              765 ml   Output              650 ml   Net              115 ml     Laboratory                        Imaging  DX-HIP-UNILATERAL-WITH PELVIS-1 VIEW RIGHT   Final Result      No radiographic evidence of acute traumatic injury right hip.      DX-CHEST-PORTABLE (1 VIEW)   Final Result      Hypoinflation without other evidence for acute cardiopulmonary disease.      MR-BRAIN-W/O   Final Result      1.  MRI of the brain without contrast within normal limits for age with mild white matter changes.   2.  Chronic appearing sinus disease   3.  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 This does not have an aggressive appearance.      US-EXTREMITY VENOUS LOWER BILAT   Final Result      CT-HAND WITH RIGHT   Final Result      1.  Edema in the dorsum of the forearm and hand without a discrete fluid collection to suggest abscess is identified.      US-CAROTID DOPPLER BILAT   Final Result      DX-LUMBAR SPINE-2 OR 3 VIEWS   Final Result         Stable compression deformity in the L1 vertebral body.      CT-CTA CHEST PULMONARY ARTERY W/ RECONS   Final Result         1. No CT evidence of pulmonary embolism.      2. Old right rib fractures.      3. Cholelithiasis. Enlarged spleen with granulomas.      4. Mild superior endplate  "compression deformity of L1 could be acute. Correlate with point tenderness. No retropulsion. No malalignment.      CT-CSPINE WITHOUT PLUS RECONS   Final Result         1. No acute fracture from C1 through T3 is visualized.         CT-HEAD W/O   Final Result         1. No acute intracranial abnormality. No evidence of acute intracranial hemorrhage or mass lesion.               DX-THORACIC SPINE-WITH SWIMMERS VIEW   Final Result      No acute thoracic spine fracture or subluxation identified.      DX-CHEST-PORTABLE (1 VIEW)   Final Result      No acute cardiac or pulmonary abnormality is noted.           Assessment/Plan  * Orthostatic hypotension- (present on admission)   Assessment & Plan    Possibly secondary to dysautonomia secondary to diabetes. Recently admitted for this issue for almost a mohan  Ongoing despite midodrine, florinef, mestinon, reglan, vit B12 and solumedrol, salt tabs, compression stockings  PT/OT   Recliner wheelchair, larry, and bedside commode have been ordered  Attempt was made to work with SW to  outpatient prescription of droxidopa so we may start in house but it is unfortunately a very specialized medication.  Previous provider discussed with Dr. Childress about neurology assistance with starting this medication but he recommended outpatient follow up  Ultimate plan is to arrange for a safe discharge with patient's current condition and severe symptoms which are not being controlled with above measures yet       Fall from ground level   Assessment & Plan    Fell while sitting at bedside on 1/9. See above      Lumbar compression fracture (HCC)   Assessment & Plan    Pain management as needed.    Neurosurgery recommended TLSO brace.   PT OT recommended home health.    F/u with [facility]      Psychogenic nonepileptic seizure- (present on admission)   Assessment & Plan    Pt had seizure like episode after he fell  Has been evaluated by neuro and normal EEG even during \"episodes\"  AVOID " benzodiazepines       Vitamin B12 deficiency- (present on admission)   Assessment & Plan    Started intramuscular injections      Syncope- (present on admission)   Assessment & Plan    Due to orthostasis.    CT of the head unremarkable. MRI brain showed chronic changes  Carotid Doppler did not reveal significant stenosis   Recent TTE showed normal EF, mild TR and MR  1/9: syncopal episode while sitting at edge of bed.   Pt has been advised to not sit up without assistance      Chronic diastolic heart failure (HCC)- (present on admission)   Assessment & Plan    Without current exacerbation.    Chronic lower extremity edema -chronic venous insufficiency      Bilateral leg edema- (present on admission)   Assessment & Plan    Appears to have chronic edematous changes.  Likely secondary to venous insufficiency.    Recent TTE showed intact EF  Lasix has been held for orthostatic hypotension  Ultrasound for DVT was study was done and it was negative for DVT  Compression stockings ordered      Acute respiratory failure with hypoxia ([facility])   Assessment & Plan    Resolved     Venous stasis dermatitis of both lower extremities- (present on admission)   Assessment & Plan    Holding lasix  Compression stockings      Paroxysmal A-fib ([facility])- (present on admission)   Assessment & Plan    Continue xarelto and amiodarone  Sinus rhythm   Patient's [id number] is 3 indicating 4.6% of stroke/tia/systemic embolisation per year.   HASBLED score is 2 indicating 4.1% bleeding risk.   Due to patient's significant orthostatic hypotension and recurrent falls the risks of bleeding outweigh the benefit of anticoagulation at this point of time.  I discussed this in detail with patient and he is agreeable.    Educated patient that if at some point in time his orthostatic hypotension and falls resolve/improve then at that time might be a good time to resume anticoagulation.  I also discussed with him that if he did want to resume anticoagulation that  I would recommend warfarin as there is a reversal agent for this.  Currently on aspirin       Essential hypertension- (present on admission)   Assessment & Plan    Lasix held for orthostatic hypotension    Midodrine will result in supine hypertension but this is necessary to try to optomize orthostatic hypotension     Mixed hyperlipidemia- (present on admission)   Assessment & Plan    Continue current therapy            VTE prophylaxis: SCDs

## 2019-01-23 ENCOUNTER — APPOINTMENT (OUTPATIENT)
Dept: INTERNAL MEDICINE | Facility: MEDICAL CENTER | Age: 54
End: 2019-01-23
Payer: MEDICAID

## 2019-01-23 PROBLEM — R45.851 SUICIDAL IDEATION: Status: ACTIVE | Noted: 2019-01-23

## 2019-01-23 LAB
ANION GAP SERPL CALC-SCNC: 6 MMOL/L (ref 0–11.9)
BUN SERPL-MCNC: 28 MG/DL (ref 8–22)
CALCIUM SERPL-MCNC: 9.7 MG/DL (ref 8.5–10.5)
CHLORIDE SERPL-SCNC: 102 MMOL/L (ref 96–112)
CO2 SERPL-SCNC: 27 MMOL/L (ref 20–33)
CREAT SERPL-MCNC: 0.79 MG/DL (ref 0.5–1.4)
GLUCOSE SERPL-MCNC: 98 MG/DL (ref 65–99)
POTASSIUM SERPL-SCNC: 4.1 MMOL/L (ref 3.6–5.5)
SODIUM SERPL-SCNC: 135 MMOL/L (ref 135–145)

## 2019-01-23 PROCEDURE — 700102 HCHG RX REV CODE 250 W/ 637 OVERRIDE(OP): Performed by: INTERNAL MEDICINE

## 2019-01-23 PROCEDURE — 80048 BASIC METABOLIC PNL TOTAL CA: CPT

## 2019-01-23 PROCEDURE — G0378 HOSPITAL OBSERVATION PER HR: HCPCS

## 2019-01-23 PROCEDURE — A9270 NON-COVERED ITEM OR SERVICE: HCPCS | Performed by: INTERNAL MEDICINE

## 2019-01-23 PROCEDURE — A9270 NON-COVERED ITEM OR SERVICE: HCPCS | Performed by: HOSPITALIST

## 2019-01-23 PROCEDURE — A9270 NON-COVERED ITEM OR SERVICE: HCPCS | Performed by: PSYCHIATRY & NEUROLOGY

## 2019-01-23 PROCEDURE — 700102 HCHG RX REV CODE 250 W/ 637 OVERRIDE(OP): Performed by: HOSPITALIST

## 2019-01-23 PROCEDURE — 700102 HCHG RX REV CODE 250 W/ 637 OVERRIDE(OP): Performed by: PSYCHIATRY & NEUROLOGY

## 2019-01-23 PROCEDURE — 36415 COLL VENOUS BLD VENIPUNCTURE: CPT

## 2019-01-23 PROCEDURE — 99214 OFFICE O/P EST MOD 30 MIN: CPT | Performed by: PSYCHIATRY & NEUROLOGY

## 2019-01-23 PROCEDURE — 99225 PR SUBSEQUENT OBSERVATION CARE,LEVEL II: CPT | Performed by: HOSPITALIST

## 2019-01-23 RX ORDER — ARIPIPRAZOLE 10 MG/1
5 TABLET ORAL DAILY
Status: DISCONTINUED | OUTPATIENT
Start: 2019-01-23 | End: 2019-01-29

## 2019-01-23 RX ORDER — FLUOXETINE HYDROCHLORIDE 20 MG/1
40 CAPSULE ORAL DAILY
Status: DISCONTINUED | OUTPATIENT
Start: 2019-01-24 | End: 2019-01-29

## 2019-01-23 RX ADMIN — PRAVASTATIN SODIUM 20 MG: 20 TABLET ORAL at 17:44

## 2019-01-23 RX ADMIN — METOCLOPRAMIDE HYDROCHLORIDE 10 MG: 10 TABLET ORAL at 16:34

## 2019-01-23 RX ADMIN — OXYCODONE HYDROCHLORIDE AND ACETAMINOPHEN 500 MG: 500 TABLET ORAL at 06:01

## 2019-01-23 RX ADMIN — FLUOXETINE HYDROCHLORIDE 20 MG: 20 CAPSULE ORAL at 06:01

## 2019-01-23 RX ADMIN — NYSTATIN: 100000 POWDER TOPICAL at 06:01

## 2019-01-23 RX ADMIN — PYRIDOSTIGMINE BROMIDE 90 MG: 60 TABLET ORAL at 11:56

## 2019-01-23 RX ADMIN — GUAIFENESIN 600 MG: 600 TABLET, EXTENDED RELEASE ORAL at 06:01

## 2019-01-23 RX ADMIN — MIDODRINE HYDROCHLORIDE 10 MG: 5 TABLET ORAL at 17:44

## 2019-01-23 RX ADMIN — ARIPIPRAZOLE 5 MG: 10 TABLET ORAL at 13:32

## 2019-01-23 RX ADMIN — SODIUM CHLORIDE TAB 1 GM 1 G: 1 TAB at 07:31

## 2019-01-23 RX ADMIN — MIDODRINE HYDROCHLORIDE 10 MG: 5 TABLET ORAL at 11:56

## 2019-01-23 RX ADMIN — SODIUM CHLORIDE TAB 1 GM 1 G: 1 TAB at 16:34

## 2019-01-23 RX ADMIN — ACETAMINOPHEN 650 MG: 325 TABLET, FILM COATED ORAL at 16:34

## 2019-01-23 RX ADMIN — GUAIFENESIN 600 MG: 600 TABLET, EXTENDED RELEASE ORAL at 17:44

## 2019-01-23 RX ADMIN — PYRIDOSTIGMINE BROMIDE 90 MG: 60 TABLET ORAL at 17:44

## 2019-01-23 RX ADMIN — ASPIRIN 81 MG 81 MG: 81 TABLET ORAL at 06:01

## 2019-01-23 RX ADMIN — SODIUM CHLORIDE TAB 1 GM 1 G: 1 TAB at 11:57

## 2019-01-23 RX ADMIN — MULTIPLE VITAMINS W/ MINERALS TAB 1 TABLET: TAB at 06:01

## 2019-01-23 RX ADMIN — AMIODARONE HYDROCHLORIDE 50 MG: 200 TABLET ORAL at 06:01

## 2019-01-23 RX ADMIN — MIDODRINE HYDROCHLORIDE 10 MG: 5 TABLET ORAL at 06:01

## 2019-01-23 RX ADMIN — METOCLOPRAMIDE HYDROCHLORIDE 10 MG: 10 TABLET ORAL at 11:57

## 2019-01-23 RX ADMIN — ACETAMINOPHEN 650 MG: 325 TABLET, FILM COATED ORAL at 06:01

## 2019-01-23 RX ADMIN — PYRIDOSTIGMINE BROMIDE 90 MG: 60 TABLET ORAL at 06:00

## 2019-01-23 RX ADMIN — FLUDROCORTISONE ACETATE 0.3 MG: 0.1 TABLET ORAL at 06:00

## 2019-01-23 RX ADMIN — Medication 400 MG: at 06:01

## 2019-01-23 RX ADMIN — METOCLOPRAMIDE HYDROCHLORIDE 10 MG: 10 TABLET ORAL at 06:01

## 2019-01-23 RX ADMIN — NYSTATIN: 100000 POWDER TOPICAL at 17:44

## 2019-01-23 ASSESSMENT — ENCOUNTER SYMPTOMS
COUGH: 0
FEVER: 0
CHILLS: 0
BLURRED VISION: 0
BACK PAIN: 1
STRIDOR: 0
DEPRESSION: 1
HEADACHES: 0
SORE THROAT: 0
DOUBLE VISION: 0
SHORTNESS OF BREATH: 0
NAUSEA: 0
ABDOMINAL PAIN: 0
NECK PAIN: 0

## 2019-01-23 ASSESSMENT — PAIN SCALES - GENERAL: PAINLEVEL_OUTOF10: 8

## 2019-01-23 NOTE — THERAPY
"Occupational Therapy Treatment completed with focus on ADLs, ADL transfers and upper extremity function.  Functional Status:  SPV supine<>EOB with log roll, SPV standing oral care, CGA functional mobility household distances  Plan of Care: Will benefit from Occupational Therapy 3 times per week  Discharge Recommendations:  Equipment Will Continue to Assess for Equipment Needs. Post-acute therapy Recommend home health or outpatient transitional care services for continued occupational therapy services    See \"Rehab Therapy-Acute\" Patient Summary Report for complete documentation.     Pt seen for OT tx that included ADL txfs, ADLs, and UE exercise. Pt donned LSO sitting EOB with SPV, cues for symmetrical tightening technique. Pt completed standing oral care at sink with cues for spinal pxns to limit bending in order to spit, provided cup and educated regarding compensatory strategy to maintain spinal pxns. Pt completed sit<>stand from EOB, chair with arms and chair without arms. Pt tolerated short walk with 1 seated rest break. Pt provided UE theraband exercise handout and educated regarding proper technique to execute each exercise. Pt reported no lightheadedness throughout session, agreed to sit up in chair post OT tx.  "

## 2019-01-23 NOTE — PROGRESS NOTES
Hospital Medicine Daily Progress Note    Date of Service  1/23/2019    Chief Complaint  53 y.o. male admitted 12/28/2018 with syncope.    Hospital Course      PMHx CHD, DM, HTN, HLD, and recent hospitalization for afib s/p cardioversion who had difficult to control orthostatic hypotension at that time. He had right and left cardiac cath at that time and no cardiac cause of orthostatic hypotension was identified. He was discharged on florinef, salt tabs, midodrine.  He presented with syncope while walking and back pain. He was found with L1 compression fracture. Dr Pepper with NSG recommended TLSO and management for osteoporosis. Had had CT head and carotid doppler that was unremarkable. Recent TTE showed normal EF with mild MR and TR. MRI brain showed chronic changes. TSH was normal. He has orthostatic hypotension that's been difficult to correct despite multiple medications.   He has chronic leg swelling that has been worse since lasix stopped for hypotension. Doppler was negative for DVT bilaterally. He was noted to have right hand cellulitis and treated with clindamycin.  He continues to feel dizzy at times even when laying down. Suffered from a syncopal episode while sitting at edge of bed resulting in fall. Code blue was called as patient was unresponsive for several seconds. This followed by a psychogenic non epileptic seizure. Luckily patient did not suffer from any significant trauma from this fall. Upon discussion, patient's xarelto was discontinued as we both felt the risks of bleeding at this time outweigh the benefits due to his recurrent falls. Attempt was made to get droxidopa as an outpatient started in the hospital but this is a specialized medication which requires documents to be completed for initiation.  Recliner wheelchair, larry lift, and bedside commode has been arranged for patient and he understands he will likely need to stay in that position due to his debilitating orthostatic hypotension.  He will also need outpatient arrangement of some orthostatic hypotension specialist.          Interval Problem Update  Heart rate 59-61  Systolic blood pressure 161-167  Diastolic blood pressure 69-92  Saturating well on room air  Sodium 135, potassium 4.1, BUN 28, creatinine 0.79   Patient expressed that he is having thoughts of harming himself.  He reports that he has been depressed however not open about it.  He reports that he has a plan to kill himself by cutting his wrist.  He reports that he had tried cutting his right wrist around 1-1/2 months before.   Patient has been placed on a legal hold, psychiatry consulted.  Discussed with patient, nurse, psychiatry specialist and with multidisciplinary team during including , pharmacist and charge nurse.     Consultants/Specialty  None    Code Status  Full    Disposition  TBD     Review of Systems  Review of Systems   Constitutional: Negative for chills and fever.   HENT: Negative for congestion and sore throat.    Eyes: Negative for blurred vision and double vision.   Respiratory: Negative for cough, shortness of breath and stridor.    Cardiovascular: Positive for leg swelling. Negative for chest pain.   Gastrointestinal: Negative for abdominal pain and nausea.   Genitourinary: Negative for dysuria and urgency.   Musculoskeletal: Positive for back pain. Negative for neck pain.   Skin: Negative for rash.   Neurological: Negative for headaches.   Psychiatric/Behavioral: Positive for depression and suicidal ideas.        Physical Exam  Temp:  [36.1 °C (96.9 °F)-36.8 °C (98.3 °F)] 36.1 °C (96.9 °F)  Pulse:  [53-82] 61  Resp:  [17-18] 18  BP: (106-167)/(52-92) (P) 130/70  SpO2:  [94 %-96 %] 96 %    Physical Exam   Constitutional: He is oriented to person, place, and time. He appears well-developed and well-nourished.   HENT:   Head: Normocephalic.   Mouth/Throat: Oropharynx is clear and moist.   Eyes: Conjunctivae are normal. Right eye exhibits no discharge.  Left eye exhibits no discharge.   Neck: Neck supple. No JVD present.   Cardiovascular: Normal rate and regular rhythm.    No murmur heard.  Pulmonary/Chest: Effort normal and breath sounds normal. No respiratory distress. He has no wheezes.   Abdominal: Soft. He exhibits no distension. There is no tenderness.   Musculoskeletal: He exhibits edema (Marked B/L LE edema ). He exhibits no tenderness.   Neurological: He is alert and oriented to person, place, and time.   Skin: Skin is warm and dry.   Psychiatric: He has a normal mood and affect.   Nursing note and vitals reviewed.    Fluids    Intake/Output Summary (Last 24 hours) at 01/23/19 1605  Last data filed at 01/23/19 1200   Gross per 24 hour   Intake              490 ml   Output             1150 ml   Net             -660 ml     Laboratory      Recent Labs      01/23/19   0401   SODIUM  135   POTASSIUM  4.1   CHLORIDE  102   CO2  27   GLUCOSE  98   BUN  28*   CREATININE  0.79   CALCIUM  9.7                   Imaging  DX-HIP-UNILATERAL-WITH PELVIS-1 VIEW RIGHT   Final Result      No radiographic evidence of acute traumatic injury right hip.      DX-CHEST-PORTABLE (1 VIEW)   Final Result      Hypoinflation without other evidence for acute cardiopulmonary disease.      MR-BRAIN-W/O   Final Result      1.  MRI of the brain without contrast within normal limits for age with mild white matter changes.   2.  Chronic appearing sinus disease   3.  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 This does not have an aggressive appearance.      US-EXTREMITY VENOUS LOWER BILAT   Final Result      CT-HAND WITH RIGHT   Final Result      1.  Edema in the dorsum of the forearm and hand without a discrete fluid collection to suggest abscess is identified.      US-CAROTID DOPPLER BILAT   Final Result      DX-LUMBAR SPINE-2 OR 3 VIEWS   Final Result         Stable compression deformity in the L1 vertebral body.      CT-CTA CHEST PULMONARY  ARTERY W/ RECONS   Final Result         1. No CT evidence of pulmonary embolism.      2. Old right rib fractures.      3. Cholelithiasis. Enlarged spleen with granulomas.      4. Mild superior endplate compression deformity of L1 could be acute. Correlate with point tenderness. No retropulsion. No malalignment.      CT-CSPINE WITHOUT PLUS RECONS   Final Result         1. No acute fracture from C1 through T3 is visualized.         CT-HEAD W/O   Final Result         1. No acute intracranial abnormality. No evidence of acute intracranial hemorrhage or mass lesion.               DX-THORACIC SPINE-WITH SWIMMERS VIEW   Final Result      No acute thoracic spine fracture or subluxation identified.      DX-CHEST-PORTABLE (1 VIEW)   Final Result      No acute cardiac or pulmonary abnormality is noted.           Assessment/Plan  * Orthostatic hypotension- (present on admission)   Assessment & Plan    Possibly secondary to dysautonomia secondary to diabetes. Recently admitted for this issue for almost a mohan  Ongoing despite midodrine, florinef, mestinon, reglan, vit B12 and solumedrol, salt tabs, compression stockings  PT/OT   Recliner wheelchair, larry, and bedside commode have been ordered  Attempt was made to work with SW to  outpatient prescription of droxidopa so we may start in house but it is unfortunately a very specialized medication.  Previous provider discussed with Dr. Childress about neurology assistance with starting this medication but he recommended outpatient follow up  Ultimate plan is to arrange for a safe discharge with patient's current condition and severe symptoms which are not being controlled with above measures yet       Suicidal ideation   Assessment & Plan    On 1/23/2019 patient expressed that he is having thoughts of harming himself.    He reports that he has been depressed however not open about it.    He reports that he has a plan to kill himself by cutting his wrist.    He reports that he  "had tried cutting his right wrist around 1-1/2 months before.   Patient has been placed on a legal hold, psychiatry consulted.      Fall from ground level   Assessment & Plan    Fell while sitting at bedside on 1/9. See above      Lumbar compression fracture (HCC)   Assessment & Plan    Pain management as needed.    Neurosurgery recommended TLSO brace.   PT OT recommended home health.    F/u with [facility]      Psychogenic nonepileptic seizure- (present on admission)   Assessment & Plan    Pt had seizure like episode after he fell  Has been evaluated by neuro and normal EEG even during \"episodes\"  AVOID benzodiazepines       Vitamin B12 deficiency- (present on admission)   Assessment & Plan    Started intramuscular injections      Syncope- (present on admission)   Assessment & Plan    Due to orthostasis.    CT of the head unremarkable. MRI brain showed chronic changes  Carotid Doppler did not reveal significant stenosis   Recent TTE showed normal EF, mild TR and MR   1/9: syncopal episode while sitting at edge of bed.   Pt has been advised to not sit up without assistance      Acute respiratory failure with hypoxia (HCC)   Assessment & Plan    Resolved     Chronic diastolic heart failure (HCC)- (present on admission)   Assessment & Plan    Without current exacerbation.    Chronic lower extremity edema -chronic venous insufficiency       Bilateral leg edema- (present on admission)   Assessment & Plan    Appears to have chronic edematous changes.  Likely secondary to venous insufficiency.    Recent TTE showed intact EF  Lasix has been held for orthostatic hypotension  Ultrasound for DVT was study was done and it was negative for DVT  Compression stockings ordered       Venous stasis dermatitis of both lower extremities- (present on admission)   Assessment & Plan    Holding lasix  Compression stockings      Paroxysmal A-fib (HCC)- (present on admission)   Assessment & Plan    Continue xarelto and amiodarone  Sinus " rhythm   Patient's [id number] is 3 indicating 4.6% of stroke/tia/systemic embolisation per year.   HASBLED score is 2 indicating 4.1% bleeding risk.   Due to patient's significant orthostatic hypotension and recurrent falls the risks of bleeding outweigh the benefit of anticoagulation at this point of time.  I discussed this in detail with patient and he is agreeable.    Educated patient that if at some point in time his orthostatic hypotension and falls resolve/improve then at that time might be a good time to resume anticoagulation.  I also discussed with him that if he did want to resume anticoagulation that I would recommend warfarin as there is a reversal agent for this.  Currently on aspirin       Essential hypertension- (present on admission)   Assessment & Plan    Lasix held for orthostatic hypotension    Midodrine will result in supine hypertension but this is necessary to try to optomize orthostatic hypotension      Mixed hyperlipidemia- (present on admission)   Assessment & Plan    Continue current therapy            VTE prophylaxis: SCDs

## 2019-01-23 NOTE — PROGRESS NOTES
"Received bedside report from night-shift RN and assumed care of patient. Labs and orders noted. Assessment performed. Patient is alert and oriented x4. Patient states having thoughts of hurting self \"with a simple plan such as stabbing myself with a knife.\" 1:1 sitter initiated at 0731. All belongings labeled and removed from room. Patient updated on plan of care; verbalizes understanding and states all of his questions/concerns have been addressed and answered appropriately.           "

## 2019-01-23 NOTE — PSYCHIATRY
PSYCHIATRIC FOLLOW UP:       Reason for consult: functional seizures   Requesting Physician: Slime     HPI:       Sebastián Castro is a 53 y.o. year old male with a PMH of CHF, a fib, DM, HTN,  a fib s/p cardioversion, orthostatic hypotentison with depressed mood, PTSD, and some functional seizures. He was also noted to have an L1 compression fracture and was placed in a TLSO. Currently being reconsulted for suicidal ideation. He is still feeling dizzy even when laying down. Suffered from syncopal episode and fell. He is currently having thoughts of harmin himself, with thoughts to slit his wrist. He has attempted suicide in the past. He is a difficult discharge, cannot d/c to homeless shelter. He is very, very depressed currently. He stated several times he would like to die.         Psychiatric Examination: observed phenomenon:  Vitals: BP (!) 167/92 Comment: RN notified   Pulse 61   Temp 36.1 °C (96.9 °F) (Temporal)   Resp 18   Ht 1.829 m (6')   Wt 111.1 kg (244 lb 14.9 oz)   SpO2 96%   BMI 33.22 kg/m²  Body mass index is 33.22 kg/m².  Musculoskeletal psychomotor retardation   Appearance: grooming fair   Thoughts Process:Logical and sequential, goal-directed   Thought Content:  No a/vh, no evidence of delusions, no ideas of reference, no internal stimulation noted   Speech: Nl tone, rate, and volume. Not pressured. Understandable.   Mood:    depressed  Affect:    constricted  SI/HI:   +si with plan   Attention/Alertness:   Awake, alert.   Memory:    Grossly intact.   Orientation:    Grossly intact.   Cognition:Grossly intact.   Insight:fair   Judgement:  Fair   Neurological Testing:    Medical systems reviewed:   Eyes:no blurry vision   Skin: no rash   CV: no palpitations no cp   Lungs:no sob   Neuro: no numbness tingling, no ha.   GI:no n/v   :no dysuria   Endocrine:  Psych:see hpi   Musculoskeletal: some pain   All other systems reviewed and are negative.       Soc history:    Appears he has  been denied at [facility] due to being a fall risk   Cannot live at homeless shelter with his condition.   PT has stated he will need home health, wheelchair, and larry     Lab results/tests:   Recent Results (from the past 48 hour(s))   BASIC METABOLIC PANEL    Collection Time: 01/23/19  4:01 AM   Result Value Ref Range    Co2 27 20 - 33 mmol/L    Glucose 98 65 - 99 mg/dL    Bun 28 (H) 8 - 22 mg/dL    Creatinine 0.79 0.50 - 1.40 mg/dL    Calcium 9.7 8.5 - 10.5 mg/dL    Sodium 135 135 - 145 mmol/L    Potassium 4.1 3.6 - 5.5 mmol/L    Chloride 102 96 - 112 mmol/L    Anion Gap 6.0 0.0 - 11.9   ESTIMATED GFR    Collection Time: 01/23/19  4:01 AM   Result Value Ref Range    GFR If African American >60 >60 mL/min/1.73 m 2    GFR If Non African American >60 >60 mL/min/1.73 m 2     DX-HIP-UNILATERAL-WITH PELVIS-1 VIEW RIGHT   Final Result      No radiographic evidence of acute traumatic injury right hip.      DX-CHEST-PORTABLE (1 VIEW)   Final Result      Hypoinflation without other evidence for acute cardiopulmonary disease.      MR-BRAIN-W/O   Final Result      1.  MRI of the brain without contrast within normal limits for age with mild white matter changes.   2.  Chronic appearing sinus disease   3.  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 This does not have an aggressive appearance.      US-EXTREMITY VENOUS LOWER BILAT   Final Result      CT-HAND WITH RIGHT   Final Result      1.  Edema in the dorsum of the forearm and hand without a discrete fluid collection to suggest abscess is identified.      US-CAROTID DOPPLER BILAT   Final Result      DX-LUMBAR SPINE-2 OR 3 VIEWS   Final Result         Stable compression deformity in the L1 vertebral body.      CT-CTA CHEST PULMONARY ARTERY W/ RECONS   Final Result         1. No CT evidence of pulmonary embolism.      2. Old right rib fractures.      3. Cholelithiasis. Enlarged spleen with granulomas.      4. Mild superior endplate  compression deformity of L1 could be acute. Correlate with point tenderness. No retropulsion. No malalignment.      CT-CSPINE WITHOUT PLUS RECONS   Final Result         1. No acute fracture from C1 through T3 is visualized.         CT-HEAD W/O   Final Result         1. No acute intracranial abnormality. No evidence of acute intracranial hemorrhage or mass lesion.               DX-THORACIC SPINE-WITH SWIMMERS VIEW   Final Result      No acute thoracic spine fracture or subluxation identified.      DX-CHEST-PORTABLE (1 VIEW)   Final Result      No acute cardiac or pulmonary abnormality is noted.               Assessment:            MDD, severe, without psychotic features     Plan:  legal hold: extending     Starting abilify as adjunct  Increasing prozac.   Will follow.

## 2019-01-23 NOTE — PROGRESS NOTES
Report received. Assumed care, assessment complete. Pt is A & O x 4.  Pt denies having any pain at this time. Fall precautions and appropriate signs in place. Pt oriented to unit routine, call light/phone system ad RN extension number provided. Pt educated regarding fall precautions. Bed alarm in use. Pt denies any additional needs at this time. Call light within reach.

## 2019-01-23 NOTE — CARE PLAN
Problem: Pain Management  Goal: Pain level will decrease to patient's comfort goal    Intervention: Educate and implement non-pharmacologic comfort measures. Examples: relaxation, distration, play therapy, activity therapy, massage, etc.  Patient educate don non-pharmacological methods of pain relief. Heat packs offered. Back brace in use when patient is out of bed.       Problem: Safety  Goal: Will remain free from injury  Outcome: PROGRESSING AS EXPECTED  Legal hold safety precautions in place. 1:1 at bedside.

## 2019-01-23 NOTE — CARE PLAN
Problem: Pain Management  Goal: Pain level will decrease to patient's comfort goal    Intervention: Follow pain managment plan developed in collaboration with patient and Interdisciplinary Team  Pain managed with prn Tylenol

## 2019-01-23 NOTE — PROGRESS NOTES
Patient on lap belt when sitting up in a chair throughout the day. Patient able to demonstrate removal of lap belt.

## 2019-01-23 NOTE — PROGRESS NOTES
Patient placed on legal hold at 0732 for SI. MD, SW, charge RN aware. Legal hold paperwork faxed to legal hold [employer]. Legal hold safety precautions in place, patient is a high risk. 1:1 sitter at bedside. Belongings removed from pt room, placed in unit closet, labeled with pt information. Psych consult placed by Dr. Palencia.

## 2019-01-24 PROBLEM — R53.81 DEBILITY: Status: ACTIVE | Noted: 2019-01-24

## 2019-01-24 PROCEDURE — 700102 HCHG RX REV CODE 250 W/ 637 OVERRIDE(OP): Performed by: HOSPITALIST

## 2019-01-24 PROCEDURE — 97116 GAIT TRAINING THERAPY: CPT | Mod: XU

## 2019-01-24 PROCEDURE — A9270 NON-COVERED ITEM OR SERVICE: HCPCS | Performed by: HOSPITALIST

## 2019-01-24 PROCEDURE — 99225 PR SUBSEQUENT OBSERVATION CARE,LEVEL II: CPT | Performed by: HOSPITALIST

## 2019-01-24 PROCEDURE — A9270 NON-COVERED ITEM OR SERVICE: HCPCS | Performed by: PSYCHIATRY & NEUROLOGY

## 2019-01-24 PROCEDURE — 700102 HCHG RX REV CODE 250 W/ 637 OVERRIDE(OP): Performed by: INTERNAL MEDICINE

## 2019-01-24 PROCEDURE — 97535 SELF CARE MNGMENT TRAINING: CPT | Mod: XU

## 2019-01-24 PROCEDURE — A9270 NON-COVERED ITEM OR SERVICE: HCPCS | Performed by: INTERNAL MEDICINE

## 2019-01-24 PROCEDURE — 97530 THERAPEUTIC ACTIVITIES: CPT

## 2019-01-24 PROCEDURE — 700102 HCHG RX REV CODE 250 W/ 637 OVERRIDE(OP): Performed by: PSYCHIATRY & NEUROLOGY

## 2019-01-24 PROCEDURE — G0378 HOSPITAL OBSERVATION PER HR: HCPCS

## 2019-01-24 RX ADMIN — PRAVASTATIN SODIUM 20 MG: 20 TABLET ORAL at 17:41

## 2019-01-24 RX ADMIN — ACETAMINOPHEN 650 MG: 325 TABLET, FILM COATED ORAL at 05:26

## 2019-01-24 RX ADMIN — AMIODARONE HYDROCHLORIDE 50 MG: 200 TABLET ORAL at 05:21

## 2019-01-24 RX ADMIN — NYSTATIN: 100000 POWDER TOPICAL at 05:26

## 2019-01-24 RX ADMIN — MULTIPLE VITAMINS W/ MINERALS TAB 1 TABLET: TAB at 05:23

## 2019-01-24 RX ADMIN — PYRIDOSTIGMINE BROMIDE 90 MG: 60 TABLET ORAL at 05:22

## 2019-01-24 RX ADMIN — PYRIDOSTIGMINE BROMIDE 90 MG: 60 TABLET ORAL at 12:04

## 2019-01-24 RX ADMIN — OXYCODONE HYDROCHLORIDE AND ACETAMINOPHEN 500 MG: 500 TABLET ORAL at 05:22

## 2019-01-24 RX ADMIN — GUAIFENESIN 600 MG: 600 TABLET, EXTENDED RELEASE ORAL at 17:40

## 2019-01-24 RX ADMIN — SODIUM CHLORIDE TAB 1 GM 1 G: 1 TAB at 07:55

## 2019-01-24 RX ADMIN — OXYCODONE HYDROCHLORIDE 2.5 MG: 5 TABLET ORAL at 17:48

## 2019-01-24 RX ADMIN — SODIUM CHLORIDE TAB 1 GM 1 G: 1 TAB at 17:40

## 2019-01-24 RX ADMIN — FLUOXETINE HYDROCHLORIDE 40 MG: 20 CAPSULE ORAL at 05:22

## 2019-01-24 RX ADMIN — METOCLOPRAMIDE HYDROCHLORIDE 10 MG: 10 TABLET ORAL at 12:04

## 2019-01-24 RX ADMIN — Medication 400 MG: at 05:22

## 2019-01-24 RX ADMIN — METOCLOPRAMIDE HYDROCHLORIDE 10 MG: 10 TABLET ORAL at 17:41

## 2019-01-24 RX ADMIN — SODIUM CHLORIDE TAB 1 GM 1 G: 1 TAB at 12:04

## 2019-01-24 RX ADMIN — MIDODRINE HYDROCHLORIDE 10 MG: 5 TABLET ORAL at 05:21

## 2019-01-24 RX ADMIN — FLUDROCORTISONE ACETATE 0.3 MG: 0.1 TABLET ORAL at 05:23

## 2019-01-24 RX ADMIN — GUAIFENESIN 600 MG: 600 TABLET, EXTENDED RELEASE ORAL at 05:23

## 2019-01-24 RX ADMIN — OXYCODONE HYDROCHLORIDE 2.5 MG: 5 TABLET ORAL at 12:08

## 2019-01-24 RX ADMIN — NYSTATIN: 100000 POWDER TOPICAL at 17:44

## 2019-01-24 RX ADMIN — METOCLOPRAMIDE HYDROCHLORIDE 10 MG: 10 TABLET ORAL at 05:23

## 2019-01-24 RX ADMIN — ARIPIPRAZOLE 5 MG: 10 TABLET ORAL at 05:23

## 2019-01-24 RX ADMIN — MIDODRINE HYDROCHLORIDE 10 MG: 5 TABLET ORAL at 17:40

## 2019-01-24 RX ADMIN — PYRIDOSTIGMINE BROMIDE 90 MG: 60 TABLET ORAL at 17:41

## 2019-01-24 RX ADMIN — ASPIRIN 81 MG 81 MG: 81 TABLET ORAL at 05:24

## 2019-01-24 ASSESSMENT — ENCOUNTER SYMPTOMS
BLURRED VISION: 0
STRIDOR: 0
SORE THROAT: 0
DIZZINESS: 1
CHILLS: 0
NECK PAIN: 0
DEPRESSION: 1
SHORTNESS OF BREATH: 0
ABDOMINAL PAIN: 0
LOSS OF CONSCIOUSNESS: 0
DOUBLE VISION: 0
HEADACHES: 0
COUGH: 0
NAUSEA: 0
BACK PAIN: 1
FEVER: 0
SEIZURES: 0

## 2019-01-24 ASSESSMENT — GAIT ASSESSMENTS
GAIT LEVEL OF ASSIST: SUPERVISED
DEVIATION: BRADYKINETIC
DISTANCE (FEET): 50
ASSISTIVE DEVICE: FRONT WHEEL WALKER

## 2019-01-24 ASSESSMENT — COGNITIVE AND FUNCTIONAL STATUS - GENERAL
MOBILITY SCORE: 21
MOVING FROM LYING ON BACK TO SITTING ON SIDE OF FLAT BED: A LITTLE
WALKING IN HOSPITAL ROOM: A LITTLE
CLIMB 3 TO 5 STEPS WITH RAILING: A LITTLE
SUGGESTED CMS G CODE MODIFIER MOBILITY: CJ

## 2019-01-24 NOTE — ASSESSMENT & PLAN NOTE
Patient continuing to improve but then requested reevaluation by psychiatry on 2/24  -Continue Prozac, Risperdal  -No longer in need of a 1:1 sitter  -Psychiatry following, greatly appreciate  -Continuing on a legal hold, court date for 2/27

## 2019-01-24 NOTE — CARE PLAN
Problem: Venous Thromboembolism (VTW)/Deep Vein Thrombosis (DVT) Prevention:  Goal: Patient will participate in Venous Thrombosis (VTE)/Deep Vein Thrombosis (DVT)Prevention Measures  Outcome: PROGRESSING AS EXPECTED  Pt compliant with RADHA hose and educated on SCDs. Pt willing to wear either.    Problem: Mobility  Goal: Risk for activity intolerance will decrease  Outcome: PROGRESSING AS EXPECTED  Pt instructed on plan for increase mobility to increase tolerance in order to better manage orthostatic. Pt to sit up in the chair for meals and ambulate a few feet 3x per day.

## 2019-01-24 NOTE — THERAPY
"Physical Therapy Treatment completed.   Bed Mobility:  Supine to Sit: Supervised  Transfers: Sit to Stand: Supervised  Gait: Level Of Assist: Supervised with Front-Wheel Walker       Plan of Care: Will benefit from Physical Therapy 1 times per week  Discharge Recommendations: Equipment: Wheelchair.(22\") Post-acute therapy Discharge to home with outpatient or home health for additional skilled therapy services.     See \"Rehab Therapy-Acute\" Patient Summary Report for complete documentation.     Pt seen for therapy session to determine need for different WC and tolerance with upright activity. Pt able to tolerate 25 min standing activity with several small breaks when c/o syncope and dizziness arouse. Pt able to maintain upright posture and balance without assist and was able to verbalize symptoms well and when the came and subsided. Pt did initially want to return to bed after ambulation, extensive education provided regarding remaining upright as long as possible to increase upright activity tolerance and to manage syncopal episodes better. Pt was resistant to teaching but did finally agree to accommodate. Recommend larger seat WC as Pt currently does not fit into WC at bedside. 22\" seat measured.   "

## 2019-01-24 NOTE — CARE PLAN
Problem: Venous Thromboembolism (VTW)/Deep Vein Thrombosis (DVT) Prevention:  Goal: Patient will participate in Venous Thrombosis (VTE)/Deep Vein Thrombosis (DVT)Prevention Measures    Intervention: Ensure patient wears graduated elastic stockings (RADHA hose) and/or SCDs, if ordered, when in bed or chair (Remove at least once per shift for skin check)  Pt refusing RADHA hose,education provided       Problem: Discharge Barriers/Planning  Goal: Patient's continuum of care needs will be met    Intervention: Collaborate with Transitional Care Team and Interdisciplinary Team to meet discharge needs  DC pending placement

## 2019-01-24 NOTE — PROGRESS NOTES
Report received. Assumed care at 0700, assessment complete. Pt is A&O x 4. 9/10 pain at this time in back at this time. Pt declines wanting any medication to treat and patient repositioned for comfort. Pt expressing feeling very depressed this AM and continues to plan to kill himself and has a plan to do so. Pt stating that a doctor is supposed to come talk to him and he would like to talk to the doctor. Patient instructed on the plan of care for the day. Bed in lowest position. Bed alarm on. Legal hold in place and safety sitter at bedside. Call light within reach. Patient provided RN and CNA extension.

## 2019-01-24 NOTE — PROGRESS NOTES
Hospital Medicine Daily Progress Note    Date of Service  1/24/2019    Chief Complaint  53 y.o. male admitted 12/28/2018 with syncope.    Hospital Course      PMHx CHD, DM, HTN, HLD, and recent hospitalization for afib s/p cardioversion who had difficult to control orthostatic hypotension at that time. He had right and left cardiac cath at that time and no cardiac cause of orthostatic hypotension was identified. He was discharged on florinef, salt tabs, midodrine.  He presented with syncope while walking and back pain. He was found with L1 compression fracture. Dr Pepper with NSG recommended TLSO and management for osteoporosis. Had had CT head and carotid doppler that was unremarkable. Recent TTE showed normal EF with mild MR and TR. MRI brain showed chronic changes. TSH was normal. He has orthostatic hypotension that's been difficult to correct despite multiple medications.   He has chronic leg swelling that has been worse since lasix stopped for hypotension. Doppler was negative for DVT bilaterally. He was noted to have right hand cellulitis and treated with clindamycin.  He continues to feel dizzy at times even when laying down. Suffered from a syncopal episode while sitting at edge of bed resulting in fall. Code blue was called as patient was unresponsive for several seconds. This followed by a psychogenic non epileptic seizure. Luckily patient did not suffer from any significant trauma from this fall. Upon discussion, patient's xarelto was discontinued as we both felt the risks of bleeding at this time outweigh the benefits due to his recurrent falls. Attempt was made to get droxidopa as an outpatient started in the hospital but this is a specialized medication which requires documents to be completed for initiation.  Recliner wheelchair, larry lift, and bedside commode has been arranged for patient and he understands he will likely need to stay in that position due to his debilitating orthostatic hypotension.  He will also need outpatient arrangement of some orthostatic hypotension specialist.          Interval Problem Update  Heart rate 56-62, saturating well on room air  Still having very erratic blood pressure readings.  Systolic blood pressure ranging from .  Diastolic blood pressure ranging from 52-92.  Discussed with nurse hold parameters for midodrine.  Still having significant orthostatic hypotension.  Continues to express suicidal ideation.    Legal hold has been extended   Fluoxetine has been increased to 40 mg and was started on Aripiprazole (Abilify) 5 mg.    Discussed with nursing, PT OT reassessment to address equipment needed.  Wide, 22 inches, standard wheel chair was recommended, and ordered.   Discussed with patient, nurse, and with multidisciplinary team during including , and charge nurse.     Consultants/Specialty  Psychiatry     Code Status  Full    Disposition  TBD     Review of Systems  Review of Systems   Constitutional: Negative for chills and fever.   HENT: Negative for congestion and sore throat.    Eyes: Negative for blurred vision and double vision.   Respiratory: Negative for cough, shortness of breath and stridor.    Cardiovascular: Positive for leg swelling. Negative for chest pain.   Gastrointestinal: Negative for abdominal pain and nausea.   Genitourinary: Negative for dysuria and urgency.   Musculoskeletal: Positive for back pain. Negative for neck pain.   Skin: Negative for rash.   Neurological: Positive for dizziness (On standing). Negative for seizures, loss of consciousness and headaches.   Psychiatric/Behavioral: Positive for depression and suicidal ideas.        Physical Exam  Temp:  [36.6 °C (97.8 °F)-37.1 °C (98.7 °F)] 36.6 °C (97.8 °F)  Pulse:  [56-62] 61  Resp:  [16-18] 16  BP: ()/(21-92) 117/67  SpO2:  [93 %-95 %] 95 %    Physical Exam   Constitutional: He is oriented to person, place, and time. He appears well-developed and well-nourished.   HENT:    Head: Normocephalic.   Mouth/Throat: Oropharynx is clear and moist.   Eyes: Conjunctivae are normal. Right eye exhibits no discharge. Left eye exhibits no discharge.   Neck: Neck supple. No JVD present.   Cardiovascular: Normal rate and regular rhythm.    No murmur heard.  Pulmonary/Chest: Effort normal and breath sounds normal. No respiratory distress. He has no wheezes.   Abdominal: Soft. He exhibits no distension. There is no tenderness.   Musculoskeletal: He exhibits edema (Marked B/L LE edema ). He exhibits no tenderness.   Neurological: He is alert and oriented to person, place, and time.   Skin: Skin is warm and dry.   Psychiatric: He has a normal mood and affect.   Nursing note and vitals reviewed.    Fluids    Intake/Output Summary (Last 24 hours) at 01/24/19 1725  Last data filed at 01/24/19 1349   Gross per 24 hour   Intake              480 ml   Output             1600 ml   Net            -1120 ml     Laboratory      Recent Labs      01/23/19   0401   SODIUM  135   POTASSIUM  4.1   CHLORIDE  102   CO2  27   GLUCOSE  98   BUN  28*   CREATININE  0.79   CALCIUM  9.7                   Imaging  DX-HIP-UNILATERAL-WITH PELVIS-1 VIEW RIGHT   Final Result      No radiographic evidence of acute traumatic injury right hip.      DX-CHEST-PORTABLE (1 VIEW)   Final Result      Hypoinflation without other evidence for acute cardiopulmonary disease.      MR-BRAIN-W/O   Final Result      1.  MRI of the brain without contrast within normal limits for age with mild white matter changes.   2.  Chronic appearing sinus disease   3.  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 This does not have an aggressive appearance.      US-EXTREMITY VENOUS LOWER BILAT   Final Result      CT-HAND WITH RIGHT   Final Result      1.  Edema in the dorsum of the forearm and hand without a discrete fluid collection to suggest abscess is identified.      US-CAROTID DOPPLER BILAT   Final Result       DX-LUMBAR SPINE-2 OR 3 VIEWS   Final Result         Stable compression deformity in the L1 vertebral body.      CT-CTA CHEST PULMONARY ARTERY W/ RECONS   Final Result         1. No CT evidence of pulmonary embolism.      2. Old right rib fractures.      3. Cholelithiasis. Enlarged spleen with granulomas.      4. Mild superior endplate compression deformity of L1 could be acute. Correlate with point tenderness. No retropulsion. No malalignment.      CT-CSPINE WITHOUT PLUS RECONS   Final Result         1. No acute fracture from C1 through T3 is visualized.         CT-HEAD W/O   Final Result         1. No acute intracranial abnormality. No evidence of acute intracranial hemorrhage or mass lesion.               DX-THORACIC SPINE-WITH SWIMMERS VIEW   Final Result      No acute thoracic spine fracture or subluxation identified.      DX-CHEST-PORTABLE (1 VIEW)   Final Result      No acute cardiac or pulmonary abnormality is noted.           Assessment/Plan  * Orthostatic hypotension- (present on admission)   Assessment & Plan    Possibly secondary to dysautonomia secondary to diabetes. Recently admitted for this issue for almost a mohan  Ongoing despite midodrine, florinef, mestinon, reglan, vit B12 and solumedrol, salt tabs, compression stockings  PT/OT   Recliner wheelchair, larry, and bedside commode have been ordered by previous attending   Attempt was made to work with SW to  outpatient prescription of droxidopa so we may start in house but it is unfortunately a very specialized medication.  Previous provider discussed with Dr. Childress about neurology assistance with starting this medication but he recommended outpatient follow up  Ultimate plan is to arrange for a safe discharge with patient's current condition and severe symptoms which are not being controlled with above measures yet       Suicidal ideation   Assessment & Plan    On 1/23/2019 patient expressed that he is having thoughts of harming himself.   "  He reports that he has been depressed however not open about it.    He reports that he has a plan to kill himself by cutting his wrist.    He reports that he had tried cutting his right wrist around 1-1/2 months before.   Patient has been placed on a legal hold, psychiatry consulted.       Fall from ground level   Assessment & Plan    Fell while sitting at bedside on 1/9. See above       Lumbar compression fracture (HCC)   Assessment & Plan    Pain management as needed.    Neurosurgery recommended TLSO brace.   PT OT recommended home health.    F/u with [facility]      Psychogenic nonepileptic seizure- (present on admission)   Assessment & Plan    Pt had seizure like episode after he fell  Has been evaluated by neuro and normal EEG even during \"episodes\"  AVOID benzodiazepines       Vitamin B12 deficiency- (present on admission)   Assessment & Plan    Started intramuscular injections      Syncope- (present on admission)   Assessment & Plan    Due to orthostasis.    CT of the head unremarkable. MRI brain showed chronic changes  Carotid Doppler did not reveal significant stenosis   Recent TTE showed normal EF, mild TR and MR   1/9: syncopal episode while sitting at edge of bed.   Pt has been advised to not sit up without assistance       Acute respiratory failure with hypoxia (HCC)   Assessment & Plan    Resolved     Chronic diastolic heart failure (HCC)- (present on admission)   Assessment & Plan    Without current exacerbation.    Chronic lower extremity edema -chronic venous insufficiency        Bilateral leg edema- (present on admission)   Assessment & Plan    Appears to have chronic edematous changes.  Likely secondary to venous insufficiency.    Recent TTE showed intact EF  Lasix has been held for orthostatic hypotension  Ultrasound for DVT was study was done and it was negative for DVT  Compression stockings ordered        Venous stasis dermatitis of both lower extremities- (present on admission) "   Assessment & Plan    Holding lasix  Compression stockings      Paroxysmal A-fib (HCC)- (present on admission)   Assessment & Plan    Continue xarelto and amiodarone  Sinus rhythm   Patient's [id number] is 3 indicating 4.6% of stroke/tia/systemic embolisation per year.   HASBLED score is 2 indicating 4.1% bleeding risk.   Due to patient's significant orthostatic hypotension and recurrent falls the risks of bleeding outweigh the benefit of anticoagulation at this point of time.  I discussed this in detail with patient and he is agreeable.    Educated patient that if at some point in time his orthostatic hypotension and falls resolve/improve then at that time might be a good time to resume anticoagulation.  I also discussed with him that if he did want to resume anticoagulation that I would recommend warfarin as there is a reversal agent for this.  Currently on aspirin       Essential hypertension- (present on admission)   Assessment & Plan    Lasix held for orthostatic hypotension    Midodrine will result in supine hypertension but this is necessary to try to optomize orthostatic hypotension       Mixed hyperlipidemia- (present on admission)   Assessment & Plan    Continue current therapy            VTE prophylaxis: SCDs

## 2019-01-24 NOTE — PROGRESS NOTES
Report received. Assumed care, assessment complete. Pt is A & O x 4.  Pt reports back pain ,declines intervention. Fall precautions and appropriate signs in place.  RN extension number provided. Pt educated regarding fall precautions. Bed alarm in use. 1:1 sitter at bedside per legal 2000. Pt denies any additional needs at this time.

## 2019-01-25 PROCEDURE — A9270 NON-COVERED ITEM OR SERVICE: HCPCS | Performed by: HOSPITALIST

## 2019-01-25 PROCEDURE — 700102 HCHG RX REV CODE 250 W/ 637 OVERRIDE(OP): Performed by: HOSPITALIST

## 2019-01-25 PROCEDURE — A9270 NON-COVERED ITEM OR SERVICE: HCPCS | Performed by: INTERNAL MEDICINE

## 2019-01-25 PROCEDURE — 97110 THERAPEUTIC EXERCISES: CPT

## 2019-01-25 PROCEDURE — 99225 PR SUBSEQUENT OBSERVATION CARE,LEVEL II: CPT | Performed by: HOSPITALIST

## 2019-01-25 PROCEDURE — 700102 HCHG RX REV CODE 250 W/ 637 OVERRIDE(OP): Performed by: INTERNAL MEDICINE

## 2019-01-25 PROCEDURE — A9270 NON-COVERED ITEM OR SERVICE: HCPCS | Performed by: PSYCHIATRY & NEUROLOGY

## 2019-01-25 PROCEDURE — 700102 HCHG RX REV CODE 250 W/ 637 OVERRIDE(OP): Performed by: PSYCHIATRY & NEUROLOGY

## 2019-01-25 PROCEDURE — G0378 HOSPITAL OBSERVATION PER HR: HCPCS

## 2019-01-25 PROCEDURE — 97535 SELF CARE MNGMENT TRAINING: CPT

## 2019-01-25 RX ORDER — FLUDROCORTISONE ACETATE 0.1 MG/1
0.3 TABLET ORAL EVERY MORNING
Status: DISCONTINUED | OUTPATIENT
Start: 2019-01-26 | End: 2019-02-10

## 2019-01-25 RX ADMIN — SODIUM CHLORIDE TAB 1 GM 1 G: 1 TAB at 05:19

## 2019-01-25 RX ADMIN — PYRIDOSTIGMINE BROMIDE 90 MG: 60 TABLET ORAL at 11:47

## 2019-01-25 RX ADMIN — NYSTATIN: 100000 POWDER TOPICAL at 17:48

## 2019-01-25 RX ADMIN — ASPIRIN 81 MG 81 MG: 81 TABLET ORAL at 05:21

## 2019-01-25 RX ADMIN — METOCLOPRAMIDE HYDROCHLORIDE 10 MG: 10 TABLET ORAL at 05:22

## 2019-01-25 RX ADMIN — FLUDROCORTISONE ACETATE 0.3 MG: 0.1 TABLET ORAL at 05:20

## 2019-01-25 RX ADMIN — OXYCODONE HYDROCHLORIDE 5 MG: 5 TABLET ORAL at 19:58

## 2019-01-25 RX ADMIN — MIDODRINE HYDROCHLORIDE 10 MG: 5 TABLET ORAL at 11:48

## 2019-01-25 RX ADMIN — FLUOXETINE HYDROCHLORIDE 40 MG: 20 CAPSULE ORAL at 05:21

## 2019-01-25 RX ADMIN — ARIPIPRAZOLE 5 MG: 10 TABLET ORAL at 05:20

## 2019-01-25 RX ADMIN — SODIUM CHLORIDE TAB 1 GM 1 G: 1 TAB at 17:47

## 2019-01-25 RX ADMIN — NYSTATIN: 100000 POWDER TOPICAL at 05:19

## 2019-01-25 RX ADMIN — MIDODRINE HYDROCHLORIDE 10 MG: 5 TABLET ORAL at 17:47

## 2019-01-25 RX ADMIN — GUAIFENESIN 600 MG: 600 TABLET, EXTENDED RELEASE ORAL at 05:21

## 2019-01-25 RX ADMIN — MULTIPLE VITAMINS W/ MINERALS TAB 1 TABLET: TAB at 05:21

## 2019-01-25 RX ADMIN — METOCLOPRAMIDE HYDROCHLORIDE 10 MG: 10 TABLET ORAL at 17:47

## 2019-01-25 RX ADMIN — PRAVASTATIN SODIUM 20 MG: 20 TABLET ORAL at 17:47

## 2019-01-25 RX ADMIN — Medication 400 MG: at 05:21

## 2019-01-25 RX ADMIN — MIDODRINE HYDROCHLORIDE 10 MG: 5 TABLET ORAL at 05:19

## 2019-01-25 RX ADMIN — PYRIDOSTIGMINE BROMIDE 90 MG: 60 TABLET ORAL at 05:18

## 2019-01-25 RX ADMIN — OXYCODONE HYDROCHLORIDE 2.5 MG: 5 TABLET ORAL at 08:50

## 2019-01-25 RX ADMIN — AMIODARONE HYDROCHLORIDE 50 MG: 200 TABLET ORAL at 05:22

## 2019-01-25 RX ADMIN — METOCLOPRAMIDE HYDROCHLORIDE 10 MG: 10 TABLET ORAL at 11:47

## 2019-01-25 RX ADMIN — SODIUM CHLORIDE TAB 1 GM 1 G: 1 TAB at 11:47

## 2019-01-25 RX ADMIN — PYRIDOSTIGMINE BROMIDE 90 MG: 60 TABLET ORAL at 17:47

## 2019-01-25 RX ADMIN — GUAIFENESIN 600 MG: 600 TABLET, EXTENDED RELEASE ORAL at 17:47

## 2019-01-25 RX ADMIN — OXYCODONE HYDROCHLORIDE AND ACETAMINOPHEN 500 MG: 500 TABLET ORAL at 05:22

## 2019-01-25 ASSESSMENT — ENCOUNTER SYMPTOMS
SORE THROAT: 0
STRIDOR: 0
DOUBLE VISION: 0
FEVER: 0
SHORTNESS OF BREATH: 0
HEADACHES: 0
DIZZINESS: 1
SEIZURES: 0
CHILLS: 0
BLURRED VISION: 0
LOSS OF CONSCIOUSNESS: 0
NAUSEA: 0
DEPRESSION: 1
ABDOMINAL PAIN: 0
NECK PAIN: 0
BACK PAIN: 1
COUGH: 0

## 2019-01-25 ASSESSMENT — COGNITIVE AND FUNCTIONAL STATUS - GENERAL
TOILETING: A LITTLE
DRESSING REGULAR LOWER BODY CLOTHING: A LITTLE
HELP NEEDED FOR BATHING: A LITTLE
SUGGESTED CMS G CODE MODIFIER DAILY ACTIVITY: CJ
DAILY ACTIVITIY SCORE: 20
PERSONAL GROOMING: A LITTLE

## 2019-01-25 ASSESSMENT — PATIENT HEALTH QUESTIONNAIRE - PHQ9
1. LITTLE INTEREST OR PLEASURE IN DOING THINGS: MORE THAN HALF THE DAYS
SUM OF ALL RESPONSES TO PHQ9 QUESTIONS 1 AND 2: 4
2. FEELING DOWN, DEPRESSED, IRRITABLE, OR HOPELESS: MORE THAN HALF THE DAYS

## 2019-01-25 NOTE — DISCHARGE PLANNING
Situation: Pt fall risk and is not safe to discharge to shelter. Pt currently on legal hold     Background: He was out walking with his girlfriend, when he suddenly felt weak, falling to the floor, he remembers coming to when emergency medical services were at his side.  He states that he fell after passing out, and hit his head, as well as his back.Pt has significant issues with dizziness and light headedness in sitting and standing due to bp drop.     PT/OT continuing to work with pt to increase activity tolerance. Pt reported he will be getting up with staff 3 x daily. Psychiatry working with pt addressing SI and acceptance of current medical condition.     Assessment: Pt continuing to work on adjusting to medical condition and increasing activity tolerance.     Recommendation/Requests: Pt will discharge to shelter pending legal hold and increased activity tolerance. Case discussed with Leadership.

## 2019-01-25 NOTE — CARE PLAN
Problem: Infection  Goal: Will remain free from infection  Outcome: PROGRESSING AS EXPECTED  Pt taught the importance of proper oral care, hand hygiene and bathing in the prevention of infections.    Problem: Mobility  Goal: Risk for activity intolerance will decrease  Outcome: PROGRESSING AS EXPECTED  Pt to ambulate each time he get up to get in the chair for every meal.

## 2019-01-25 NOTE — PROGRESS NOTES
Report received. Assumed care at 0700, assessment complete. Pt is A&O x 4. 8/10 pain at this time and back and pt stating his head is starting to hurt a little. MD notified of elevated blood pressure and head ache. No new orders received at this time. Patient instructed on the plan of care for the day. Bed in lowest position. Bed alarm on. Call light within reach. Patient provided RN and CNA extension.

## 2019-01-25 NOTE — DISCHARGE PLANNING
Agency/Facility Name: [facility]   Spoke To: Angelika  Outcome: Informed [facility] that wheelchair that was delivered did not fit patient. New order faxed at [phone] for new wheelchair. Angelika will inform intake to exchange wheelchair. JAMES Oropeza notified.

## 2019-01-25 NOTE — FACE TO FACE
Face to Face Note  -  Durable Medical Equipment    Catrachita Palencia M.D. - NPI: [id number]  I certify that this patient is under my care and that they had a durable medical equipment(DME)face to face encounter by myself that meets the physician DME face-to-face encounter requirements with this patient on:    Date of encounter:   Patient:                    MRN:                       YOB: 2021  Sebastián Castro  [id number]  1965     The encounter with the patient was in whole, or in part, for the following medical condition, which is the primary reason for durable medical equipment:  Other - debility, weakness, falls, syncope     I certify that, based on my findings, the following durable medical equipment is medically necessary:  Wheel Chair 22 inches seat measured     HOME O2 Saturation Measurements:(Values must be present for Home Oxygen orders)         ,     ,         My Clinical findings support the need for the above equipment due to:  Other - debility, weakness, falls, syncope resutling in fractures      Supporting Symptoms: debility, weakness, falls, syncope resulting in fractures

## 2019-01-25 NOTE — DISCHARGE PLANNING
New wheel chair DME order received. LSW requested [facility] to send to [facility] for delivery.

## 2019-01-25 NOTE — PROGRESS NOTES
Patient alert and oriented x4 with no complaints of pain at this time. States still does plan on killing himself but doesn't have plan at this time. Safety sitter at bedside. Patient calm and compliant.

## 2019-01-25 NOTE — THERAPY
"Occupational Therapy Treatment completed with focus on ADLs.  Functional Status:  Supine in bed with girlfriend in room on arrival. Completed supine>sit with SPV; /75. Completed don of RADHA hose with max A, educated on purpose/use and AE for donning. Completed don/doff of socks with SPV. Don LSO with min A for proper tightness, but donned by pulling over head and then tightening; req min v/cs for maintaining spinal precautions. Completed sit>stand with FWW and SPV; BP 93/65 after 2 min standing. Began to experience dizziness and returned to sitting; /80. Sit>stand with SPV; /98. Ambulated to sink with SBA and FWW, and then completed oral care with mouthwash; /68. Declined further ADLs d/t dizziness/fatigue. Pt requested to stay sitting up in chair. Left seated with sitter and girlfriend in room. Continues to req v/cs for maintaining spinal precautions. Requested order of new RADHA hose from RN, as current pair are stretched out.  Plan of Care: Will benefit from Occupational Therapy 1 times per week  Discharge Recommendations:  Equipment Will Continue to Assess for Equipment Needs. Post-acute therapy: Recommend home health or outpatient transitional care services for continued occupational therapy services    See \"Rehab Therapy-Acute\" Patient Summary Report for complete documentation.     Pt seen for OT session. Pt demo'd progress with activity tolerance and don/doff of LSO. Continued education on importance of sitting in chair and wearing compression hose and LSO to manage syncopal episodes and increase activity tolerance. Continues to req v/cs for maintaining spinal precautions. Deficits in ADLs/txfs are currently more limited by orthostatic hypotension, rather than physical or functional deficits. Reduce frequency to 1x/wk. Recommend continued acute OT, and recommend home health or outpatient transitional care for continued occupational therapy services.    "

## 2019-01-26 PROCEDURE — 99225 PR SUBSEQUENT OBSERVATION CARE,LEVEL II: CPT | Performed by: HOSPITALIST

## 2019-01-26 PROCEDURE — A9270 NON-COVERED ITEM OR SERVICE: HCPCS | Performed by: PSYCHIATRY & NEUROLOGY

## 2019-01-26 PROCEDURE — A9270 NON-COVERED ITEM OR SERVICE: HCPCS | Performed by: HOSPITALIST

## 2019-01-26 PROCEDURE — 700102 HCHG RX REV CODE 250 W/ 637 OVERRIDE(OP): Performed by: INTERNAL MEDICINE

## 2019-01-26 PROCEDURE — 700102 HCHG RX REV CODE 250 W/ 637 OVERRIDE(OP): Performed by: PSYCHIATRY & NEUROLOGY

## 2019-01-26 PROCEDURE — 700102 HCHG RX REV CODE 250 W/ 637 OVERRIDE(OP): Performed by: HOSPITALIST

## 2019-01-26 PROCEDURE — A9270 NON-COVERED ITEM OR SERVICE: HCPCS | Performed by: INTERNAL MEDICINE

## 2019-01-26 PROCEDURE — G0378 HOSPITAL OBSERVATION PER HR: HCPCS

## 2019-01-26 RX ADMIN — ASPIRIN 81 MG 81 MG: 81 TABLET ORAL at 06:31

## 2019-01-26 RX ADMIN — FLUDROCORTISONE ACETATE 0.3 MG: 0.1 TABLET ORAL at 10:14

## 2019-01-26 RX ADMIN — METOCLOPRAMIDE HYDROCHLORIDE 10 MG: 10 TABLET ORAL at 11:51

## 2019-01-26 RX ADMIN — FLUOXETINE HYDROCHLORIDE 40 MG: 20 CAPSULE ORAL at 06:30

## 2019-01-26 RX ADMIN — Medication 400 MG: at 06:30

## 2019-01-26 RX ADMIN — PYRIDOSTIGMINE BROMIDE 90 MG: 60 TABLET ORAL at 17:40

## 2019-01-26 RX ADMIN — METOCLOPRAMIDE HYDROCHLORIDE 10 MG: 10 TABLET ORAL at 06:30

## 2019-01-26 RX ADMIN — OXYCODONE HYDROCHLORIDE AND ACETAMINOPHEN 500 MG: 500 TABLET ORAL at 06:31

## 2019-01-26 RX ADMIN — PYRIDOSTIGMINE BROMIDE 90 MG: 60 TABLET ORAL at 06:30

## 2019-01-26 RX ADMIN — GUAIFENESIN 600 MG: 600 TABLET, EXTENDED RELEASE ORAL at 06:30

## 2019-01-26 RX ADMIN — ARIPIPRAZOLE 5 MG: 10 TABLET ORAL at 06:28

## 2019-01-26 RX ADMIN — PRAVASTATIN SODIUM 20 MG: 20 TABLET ORAL at 17:40

## 2019-01-26 RX ADMIN — MIDODRINE HYDROCHLORIDE 10 MG: 5 TABLET ORAL at 17:40

## 2019-01-26 RX ADMIN — SODIUM CHLORIDE TAB 1 GM 1 G: 1 TAB at 07:39

## 2019-01-26 RX ADMIN — AMIODARONE HYDROCHLORIDE 50 MG: 200 TABLET ORAL at 06:28

## 2019-01-26 RX ADMIN — OXYCODONE HYDROCHLORIDE 5 MG: 5 TABLET ORAL at 06:28

## 2019-01-26 RX ADMIN — OXYCODONE HYDROCHLORIDE 5 MG: 5 TABLET ORAL at 09:31

## 2019-01-26 RX ADMIN — SODIUM CHLORIDE TAB 1 GM 1 G: 1 TAB at 11:51

## 2019-01-26 RX ADMIN — OXYCODONE HYDROCHLORIDE 5 MG: 5 TABLET ORAL at 14:55

## 2019-01-26 RX ADMIN — OXYCODONE HYDROCHLORIDE 5 MG: 5 TABLET ORAL at 17:41

## 2019-01-26 RX ADMIN — OXYCODONE HYDROCHLORIDE 5 MG: 5 TABLET ORAL at 20:47

## 2019-01-26 RX ADMIN — MULTIPLE VITAMINS W/ MINERALS TAB 1 TABLET: TAB at 06:30

## 2019-01-26 RX ADMIN — MIDODRINE HYDROCHLORIDE 10 MG: 5 TABLET ORAL at 06:30

## 2019-01-26 RX ADMIN — PYRIDOSTIGMINE BROMIDE 90 MG: 60 TABLET ORAL at 11:51

## 2019-01-26 RX ADMIN — NYSTATIN: 100000 POWDER TOPICAL at 17:41

## 2019-01-26 RX ADMIN — NYSTATIN: 100000 POWDER TOPICAL at 06:31

## 2019-01-26 RX ADMIN — MIDODRINE HYDROCHLORIDE 10 MG: 5 TABLET ORAL at 11:51

## 2019-01-26 RX ADMIN — SODIUM CHLORIDE TAB 1 GM 1 G: 1 TAB at 17:40

## 2019-01-26 RX ADMIN — METOCLOPRAMIDE HYDROCHLORIDE 10 MG: 10 TABLET ORAL at 17:41

## 2019-01-26 RX ADMIN — GUAIFENESIN 600 MG: 600 TABLET, EXTENDED RELEASE ORAL at 17:40

## 2019-01-26 ASSESSMENT — ENCOUNTER SYMPTOMS
BLURRED VISION: 0
BACK PAIN: 1
ABDOMINAL PAIN: 0
NAUSEA: 0
FEVER: 0
SORE THROAT: 0
DIZZINESS: 1
STRIDOR: 0
LOSS OF CONSCIOUSNESS: 0
NECK PAIN: 0
CHILLS: 0
COUGH: 0
DOUBLE VISION: 0
HEADACHES: 0
DEPRESSION: 1
SHORTNESS OF BREATH: 0
SEIZURES: 0

## 2019-01-26 NOTE — CARE PLAN
Problem: Communication  Goal: The ability to communicate needs accurately and effectively will improve  Outcome: PROGRESSING AS EXPECTED  Patient verbalizes needs appropriately.

## 2019-01-26 NOTE — PROGRESS NOTES
Pt is A&Ox4,c/o pain in back. Pain medication provided. When asked about continued desire or plans to harm himself, pt states that it depends on whether he gets better. When asked about a plan, he states that he would probably slit his wrists. 1:1 sitter with pt at all times. Bed is in lowest position, non slip socks worn. Bed alarm on.

## 2019-01-26 NOTE — PROGRESS NOTES
Hospital Medicine Daily Progress Note    Date of Service  1/26/2019    Chief Complaint  53 y.o. male admitted 12/28/2018 with syncope.    Hospital Course      PMHx CHD, DM, HTN, HLD, and recent hospitalization for afib s/p cardioversion who had difficult to control orthostatic hypotension at that time. He had right and left cardiac cath at that time and no cardiac cause of orthostatic hypotension was identified. He was discharged on florinef, salt tabs, midodrine.  He presented with syncope while walking and back pain. He was found with L1 compression fracture. Dr Pepper with NSG recommended TLSO and management for osteoporosis. Had had CT head and carotid doppler that was unremarkable. Recent TTE showed normal EF with mild MR and TR. MRI brain showed chronic changes. TSH was normal. He has orthostatic hypotension that's been difficult to correct despite multiple medications.   He has chronic leg swelling that has been worse since lasix stopped for hypotension. Doppler was negative for DVT bilaterally. He was noted to have right hand cellulitis and treated with clindamycin.  He continues to feel dizzy at times even when laying down. Suffered from a syncopal episode while sitting at edge of bed resulting in fall. Code blue was called as patient was unresponsive for several seconds. This followed by a psychogenic non epileptic seizure. Luckily patient did not suffer from any significant trauma from this fall. Upon discussion, patient's xarelto was discontinued as we both felt the risks of bleeding at this time outweigh the benefits due to his recurrent falls. Attempt was made to get droxidopa as an outpatient started in the hospital but this is a specialized medication which requires documents to be completed for initiation.  Recliner wheelchair, larry lift, and bedside commode has been arranged for patient and he understands he will likely need to stay in that position due to his debilitating orthostatic hypotension.  He will also need outpatient arrangement of some orthostatic hypotension specialist.          Interval Problem Update  Still having labile blood pressure  Morning blood pressure better today after removing the fludrocortisone dose to 10 AM  Still having significant orthostasis  Encouraged to increase his exercise to develop more endurance  Patient has been using his wide 22 inches wheelchair around the hallways accompanied by the sitter  Continues to feel depressed and expressing suicidal ideation.  On Legal hold   Currently on fluoxetine 40 mg and Aripiprazole (Abilify) 5 mg.    Discussed with patient, nurse, and charge nurse.     Consultants/Specialty  Psychiatry     Code Status  Full    Disposition  TBD     Review of Systems  Review of Systems   Constitutional: Negative for chills and fever.   HENT: Negative for congestion and sore throat.    Eyes: Negative for blurred vision and double vision.   Respiratory: Negative for cough, shortness of breath and stridor.    Cardiovascular: Positive for leg swelling. Negative for chest pain.   Gastrointestinal: Negative for abdominal pain and nausea.   Genitourinary: Negative for dysuria and urgency.   Musculoskeletal: Positive for back pain. Negative for neck pain.   Skin: Negative for rash.   Neurological: Positive for dizziness (On standing). Negative for seizures, loss of consciousness and headaches.   Psychiatric/Behavioral: Positive for depression and suicidal ideas.        Physical Exam  Temp:  [36.7 °C (98 °F)-36.9 °C (98.4 °F)] 36.7 °C (98 °F)  Pulse:  [58-79] 79  Resp:  [17-18] 18  BP: ()/(66-92) 99/66  SpO2:  [92 %-96 %] 96 %    Physical Exam   Constitutional: He is oriented to person, place, and time. He appears well-developed and well-nourished.   HENT:   Head: Normocephalic.   Mouth/Throat: Oropharynx is clear and moist.   Eyes: Conjunctivae are normal. Right eye exhibits no discharge. Left eye exhibits no discharge.   Neck: Neck supple. No JVD present.    Cardiovascular: Normal rate and regular rhythm.    No murmur heard.  Pulmonary/Chest: Effort normal and breath sounds normal. No respiratory distress. He has no wheezes.   Abdominal: Soft. He exhibits no distension. There is no tenderness.   Musculoskeletal: He exhibits edema (Marked B/L LE edema ). He exhibits no tenderness.   Neurological: He is alert and oriented to person, place, and time.   Skin: Skin is warm and dry.   Psychiatric:   Depressed, expressing suicidal ideation   Nursing note and vitals reviewed.    Fluids    Intake/Output Summary (Last 24 hours) at 01/26/19 1941  Last data filed at 01/26/19 1635   Gross per 24 hour   Intake             1210 ml   Output             1500 ml   Net             -290 ml     Laboratory                        Imaging  DX-HIP-UNILATERAL-WITH PELVIS-1 VIEW RIGHT   Final Result      No radiographic evidence of acute traumatic injury right hip.      DX-CHEST-PORTABLE (1 VIEW)   Final Result      Hypoinflation without other evidence for acute cardiopulmonary disease.      MR-BRAIN-W/O   Final Result      1.  MRI of the brain without contrast within normal limits for age with mild white matter changes.   2.  Chronic appearing sinus disease   3.  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 This does not have an aggressive appearance.      US-EXTREMITY VENOUS LOWER BILAT   Final Result      CT-HAND WITH RIGHT   Final Result      1.  Edema in the dorsum of the forearm and hand without a discrete fluid collection to suggest abscess is identified.      US-CAROTID DOPPLER BILAT   Final Result      DX-LUMBAR SPINE-2 OR 3 VIEWS   Final Result         Stable compression deformity in the L1 vertebral body.      CT-CTA CHEST PULMONARY ARTERY W/ RECONS   Final Result         1. No CT evidence of pulmonary embolism.      2. Old right rib fractures.      3. Cholelithiasis. Enlarged spleen with granulomas.      4. Mild superior endplate  compression deformity of L1 could be acute. Correlate with point tenderness. No retropulsion. No malalignment.      CT-CSPINE WITHOUT PLUS RECONS   Final Result         1. No acute fracture from C1 through T3 is visualized.         CT-HEAD W/O   Final Result         1. No acute intracranial abnormality. No evidence of acute intracranial hemorrhage or mass lesion.               DX-THORACIC SPINE-WITH SWIMMERS VIEW   Final Result      No acute thoracic spine fracture or subluxation identified.      DX-CHEST-PORTABLE (1 VIEW)   Final Result      No acute cardiac or pulmonary abnormality is noted.           Assessment/Plan  * Orthostatic hypotension- (present on admission)   Assessment & Plan    Possibly secondary to dysautonomia secondary to diabetes. Recently admitted for this issue for almost a mohan  Ongoing despite midodrine, fludrocortisone, mestinon, reglan, vit B12 and solumedrol, salt tabs, compression stockings  PT/OT   Recliner wheelchair, larry, and bedside commode have been ordered by previous attending   Attempt was made to work with SW to  outpatient prescription of droxidopa so we may start in house but it is unfortunately a very specialized medication.  Previous provider discussed with Dr. Childress about neurology assistance with starting this medication but he recommended outpatient follow up  Ultimate plan is to arrange for a safe discharge with patient's current condition and severe symptoms which are not being controlled with above measures yet    Patient has been noticed to have marked systolic hypertension after the dose of fludrocortisone in the morning, the dose was moved to 10 AM 6 AM on 1/26/2019   Getting physical and occupational therapy, try to encourage physical endurance as much as possible.     Suicidal ideation   Assessment & Plan    On 1/23/2019 patient expressed that he is having thoughts of harming himself.    He reports that he has been depressed however not open about it.    He  "reports that he has a plan to kill himself by cutting his wrist.    He reports that he had tried cutting his right wrist around 1-1/2 months before.   Patient has been placed on a legal hold, psychiatry consulted.    Currently on fluoxetine 40 mg and Aripiprazole (Abilify) 5 mg.     Still expressing depression and suicidal ideation.     Fall from ground level   Assessment & Plan    Fell while sitting at bedside on 1/9. See above       Lumbar compression fracture (HCC)   Assessment & Plan    Pain management as needed.    Neurosurgery recommended TLSO brace.   PT OT recommended home health.    F/u with [facility]      Psychogenic nonepileptic seizure- (present on admission)   Assessment & Plan    Pt had seizure like episode after he fell  Has been evaluated by neuro and normal EEG even during \"episodes\"  AVOID benzodiazepines       Vitamin B12 deficiency- (present on admission)   Assessment & Plan    Started intramuscular injections      Syncope- (present on admission)   Assessment & Plan    Due to orthostasis.    CT of the head unremarkable. MRI brain showed chronic changes  Carotid Doppler did not reveal significant stenosis   Recent TTE showed normal EF, mild TR and MR   1/9: syncopal episode while sitting at edge of bed.    Pt has been advised to not sit up without assistance       Debility- (present on admission)   Assessment & Plan    Multifactorial, age, obesity, nutrition   PT/OT > wheel chair 22\", ordered, delivered being used by the patient     Acute respiratory failure with hypoxia (HCC)   Assessment & Plan    Resolved     Chronic diastolic heart failure (HCC)- (present on admission)   Assessment & Plan    Without current exacerbation.    Chronic lower extremity edema -chronic venous insufficiency        Bilateral leg edema- (present on admission)   Assessment & Plan    Appears to have chronic edematous changes.  Likely secondary to venous insufficiency.    Recent TTE showed intact EF  Lasix has been held " for orthostatic hypotension  Ultrasound for DVT was study was done and it was negative for DVT  Compression stockings ordered        Venous stasis dermatitis of both lower extremities- (present on admission)   Assessment & Plan    Holding lasix  Compression stockings      Paroxysmal A-fib (HCC)- (present on admission)   Assessment & Plan    Continue xarelto and amiodarone  Sinus rhythm   Patient's [id number] is 3 indicating 4.6% of stroke/tia/systemic embolisation per year.   HASBLED score is 2 indicating 4.1% bleeding risk.   Due to patient's significant orthostatic hypotension and recurrent falls the risks of bleeding outweigh the benefit of anticoagulation at this point of time.  I discussed this in detail with patient and he is agreeable.    Educated patient that if at some point in time his orthostatic hypotension and falls resolve/improve then at that time might be a good time to resume anticoagulation.  I also discussed with him that if he did want to resume anticoagulation that I would recommend warfarin as there is a reversal agent for this.  Currently on aspirin       Essential hypertension- (present on admission)   Assessment & Plan    Lasix held for orthostatic hypotension    Midodrine will result in supine hypertension but this is necessary to try to optomize orthostatic hypotension        Mixed hyperlipidemia- (present on admission)   Assessment & Plan    Continue current therapy            VTE prophylaxis: SCDs

## 2019-01-27 PROCEDURE — A9270 NON-COVERED ITEM OR SERVICE: HCPCS | Performed by: PSYCHIATRY & NEUROLOGY

## 2019-01-27 PROCEDURE — A9270 NON-COVERED ITEM OR SERVICE: HCPCS | Performed by: HOSPITALIST

## 2019-01-27 PROCEDURE — 700102 HCHG RX REV CODE 250 W/ 637 OVERRIDE(OP): Performed by: HOSPITALIST

## 2019-01-27 PROCEDURE — 99225 PR SUBSEQUENT OBSERVATION CARE,LEVEL II: CPT | Performed by: HOSPITALIST

## 2019-01-27 PROCEDURE — G0378 HOSPITAL OBSERVATION PER HR: HCPCS

## 2019-01-27 PROCEDURE — 700102 HCHG RX REV CODE 250 W/ 637 OVERRIDE(OP): Performed by: INTERNAL MEDICINE

## 2019-01-27 PROCEDURE — A9270 NON-COVERED ITEM OR SERVICE: HCPCS | Performed by: INTERNAL MEDICINE

## 2019-01-27 PROCEDURE — 700102 HCHG RX REV CODE 250 W/ 637 OVERRIDE(OP): Performed by: PSYCHIATRY & NEUROLOGY

## 2019-01-27 RX ORDER — MIDODRINE HYDROCHLORIDE 5 MG/1
10 TABLET ORAL DAILY
Status: DISCONTINUED | OUTPATIENT
Start: 2019-01-28 | End: 2019-02-04

## 2019-01-27 RX ORDER — MIDODRINE HYDROCHLORIDE 5 MG/1
10 TABLET ORAL DAILY
Status: DISCONTINUED | OUTPATIENT
Start: 2019-01-27 | End: 2019-02-04

## 2019-01-27 RX ORDER — MIDODRINE HYDROCHLORIDE 5 MG/1
10 TABLET ORAL DAILY
Status: DISCONTINUED | OUTPATIENT
Start: 2019-01-27 | End: 2019-01-27

## 2019-01-27 RX ADMIN — SODIUM CHLORIDE TAB 1 GM 1 G: 1 TAB at 17:48

## 2019-01-27 RX ADMIN — AMIODARONE HYDROCHLORIDE 50 MG: 200 TABLET ORAL at 05:21

## 2019-01-27 RX ADMIN — PRAVASTATIN SODIUM 20 MG: 20 TABLET ORAL at 17:48

## 2019-01-27 RX ADMIN — MULTIPLE VITAMINS W/ MINERALS TAB 1 TABLET: TAB at 05:20

## 2019-01-27 RX ADMIN — MIDODRINE HYDROCHLORIDE 10 MG: 5 TABLET ORAL at 15:59

## 2019-01-27 RX ADMIN — PYRIDOSTIGMINE BROMIDE 90 MG: 60 TABLET ORAL at 05:19

## 2019-01-27 RX ADMIN — METOCLOPRAMIDE HYDROCHLORIDE 10 MG: 10 TABLET ORAL at 05:22

## 2019-01-27 RX ADMIN — NYSTATIN: 100000 POWDER TOPICAL at 17:48

## 2019-01-27 RX ADMIN — NYSTATIN: 100000 POWDER TOPICAL at 05:23

## 2019-01-27 RX ADMIN — FLUDROCORTISONE ACETATE 0.3 MG: 0.1 TABLET ORAL at 05:20

## 2019-01-27 RX ADMIN — ASPIRIN 81 MG 81 MG: 81 TABLET ORAL at 05:20

## 2019-01-27 RX ADMIN — FLUOXETINE HYDROCHLORIDE 40 MG: 20 CAPSULE ORAL at 05:20

## 2019-01-27 RX ADMIN — OXYCODONE HYDROCHLORIDE AND ACETAMINOPHEN 500 MG: 500 TABLET ORAL at 05:21

## 2019-01-27 RX ADMIN — ARIPIPRAZOLE 5 MG: 10 TABLET ORAL at 05:21

## 2019-01-27 RX ADMIN — Medication 400 MG: at 05:22

## 2019-01-27 RX ADMIN — OXYCODONE HYDROCHLORIDE 5 MG: 5 TABLET ORAL at 07:47

## 2019-01-27 RX ADMIN — PYRIDOSTIGMINE BROMIDE 90 MG: 60 TABLET ORAL at 11:43

## 2019-01-27 RX ADMIN — METOCLOPRAMIDE HYDROCHLORIDE 10 MG: 10 TABLET ORAL at 17:48

## 2019-01-27 RX ADMIN — SODIUM CHLORIDE TAB 1 GM 1 G: 1 TAB at 07:47

## 2019-01-27 RX ADMIN — GUAIFENESIN 600 MG: 600 TABLET, EXTENDED RELEASE ORAL at 17:48

## 2019-01-27 RX ADMIN — OXYCODONE HYDROCHLORIDE 5 MG: 5 TABLET ORAL at 17:48

## 2019-01-27 RX ADMIN — GUAIFENESIN 600 MG: 600 TABLET, EXTENDED RELEASE ORAL at 05:20

## 2019-01-27 RX ADMIN — METOCLOPRAMIDE HYDROCHLORIDE 10 MG: 10 TABLET ORAL at 11:43

## 2019-01-27 RX ADMIN — OXYCODONE HYDROCHLORIDE 5 MG: 5 TABLET ORAL at 13:45

## 2019-01-27 RX ADMIN — MIDODRINE HYDROCHLORIDE 10 MG: 5 TABLET ORAL at 05:20

## 2019-01-27 RX ADMIN — OXYCODONE HYDROCHLORIDE 5 MG: 5 TABLET ORAL at 22:16

## 2019-01-27 RX ADMIN — MIDODRINE HYDROCHLORIDE 10 MG: 5 TABLET ORAL at 11:43

## 2019-01-27 RX ADMIN — SODIUM CHLORIDE TAB 1 GM 1 G: 1 TAB at 11:43

## 2019-01-27 RX ADMIN — PYRIDOSTIGMINE BROMIDE 90 MG: 60 TABLET ORAL at 17:48

## 2019-01-27 ASSESSMENT — ENCOUNTER SYMPTOMS
NECK PAIN: 0
BACK PAIN: 1
CHILLS: 0
HEADACHES: 0
DEPRESSION: 1
DIZZINESS: 1
COUGH: 0
SEIZURES: 0
LOSS OF CONSCIOUSNESS: 0
SHORTNESS OF BREATH: 0
SORE THROAT: 0
DOUBLE VISION: 0
STRIDOR: 0
NAUSEA: 0
ABDOMINAL PAIN: 0
BLURRED VISION: 0
FEVER: 0

## 2019-01-27 NOTE — CARE PLAN
Problem: Pain Management  Goal: Pain level will decrease to patient's comfort goal    Intervention: Follow pain managment plan developed in collaboration with patient and Interdisciplinary Team  Offered pain medication for his chronic back pain, will assess after 2 hours per protocol.      Problem: Safety  Goal: Will remain free from falls    Intervention: Implement fall precautions   01/26/19 2223   OTHER   Environmental Precautions Treaded Slipper Socks on Patient;Personal Belongings, Wastebasket, Call [employer] etc. in Easy Reach;Transferred to Stronger Side;Report Given to Other Health Care Providers Regarding Fall Risk;Bed in Low Position   Bedrails Bedrails Closest to Bathroom Down   Chair/Bed Strip Alarm Yes - Alarm On

## 2019-01-27 NOTE — PROGRESS NOTES
Hospital Medicine Daily Progress Note    Date of Service  1/27/2019    Chief Complaint  53 y.o. male admitted 12/28/2018 with syncope.    Hospital Course      PMHx CHD, DM, HTN, HLD, and recent hospitalization for afib s/p cardioversion who had difficult to control orthostatic hypotension at that time. He had right and left cardiac cath at that time and no cardiac cause of orthostatic hypotension was identified. He was discharged on florinef, salt tabs, midodrine.  He presented with syncope while walking and back pain. He was found with L1 compression fracture. Dr Pepper with NSG recommended TLSO and management for osteoporosis. Had had CT head and carotid doppler that was unremarkable. Recent TTE showed normal EF with mild MR and TR. MRI brain showed chronic changes. TSH was normal. He has orthostatic hypotension that's been difficult to correct despite multiple medications.   He has chronic leg swelling that has been worse since lasix stopped for hypotension. Doppler was negative for DVT bilaterally. He was noted to have right hand cellulitis and treated with clindamycin.  He continues to feel dizzy at times even when laying down. Suffered from a syncopal episode while sitting at edge of bed resulting in fall. Code blue was called as patient was unresponsive for several seconds. This followed by a psychogenic non epileptic seizure. Luckily patient did not suffer from any significant trauma from this fall. Upon discussion, patient's xarelto was discontinued as we both felt the risks of bleeding at this time outweigh the benefits due to his recurrent falls. Attempt was made to get droxidopa as an outpatient started in the hospital but this is a specialized medication which requires documents to be completed for initiation.  Recliner wheelchair, larry lift, and bedside commode has been arranged for patient and he understands he will likely need to stay in that position due to his debilitating orthostatic hypotension.  He will also need outpatient arrangement of some orthostatic hypotension specialist.          Interval Problem Update  Has very labile blood pressure  I am changing midodrine dosing to be giving during the day time while the patient is being upright   -184  DBP 74-92   Encouraged to increase his exercise to develop more endurance  Continues to feel depressed and expressing suicidal ideation.  On Legal hold   Currently on fluoxetine 40 mg and Aripiprazole (Abilify) 5 mg.    Discussed with patient, nurse, and charge nurse.      Consultants/Specialty  Psychiatry     Code Status  Full    Disposition  TBD     Review of Systems  Review of Systems   Constitutional: Negative for chills and fever.   HENT: Negative for congestion and sore throat.    Eyes: Negative for blurred vision and double vision.   Respiratory: Negative for cough, shortness of breath and stridor.    Cardiovascular: Positive for leg swelling. Negative for chest pain.   Gastrointestinal: Negative for abdominal pain and nausea.   Genitourinary: Negative for dysuria and urgency.   Musculoskeletal: Positive for back pain. Negative for neck pain.   Skin: Negative for rash.   Neurological: Positive for dizziness (On standing). Negative for seizures, loss of consciousness and headaches.   Psychiatric/Behavioral: Positive for depression and suicidal ideas.        Physical Exam  Temp:  [36.1 °C (97 °F)-37.1 °C (98.8 °F)] 37.1 °C (98.8 °F)  Pulse:  [57-79] 58  Resp:  [17-18] 18  BP: ()/(48-92) 184/92  SpO2:  [93 %-96 %] 94 %    Physical Exam   Constitutional: He is oriented to person, place, and time. He appears well-developed and well-nourished.   HENT:   Head: Normocephalic.   Mouth/Throat: Oropharynx is clear and moist.   Eyes: Conjunctivae are normal. Right eye exhibits no discharge. Left eye exhibits no discharge.   Neck: Neck supple. No JVD present.   Cardiovascular: Normal rate and regular rhythm.    No murmur heard.  Pulmonary/Chest: Effort  normal and breath sounds normal. No respiratory distress. He has no wheezes.   Abdominal: Soft. He exhibits no distension. There is no tenderness.   Musculoskeletal: He exhibits edema (Marked B/L LE edema ). He exhibits no tenderness.   Neurological: He is alert and oriented to person, place, and time.   Skin: Skin is warm and dry.   Psychiatric:   Depressed, expressing suicidal ideation   Nursing note and vitals reviewed.    Fluids    Intake/Output Summary (Last 24 hours) at 01/27/19 1508  Last data filed at 01/27/19 1400   Gross per 24 hour   Intake              840 ml   Output             1650 ml   Net             -810 ml     Laboratory                        Imaging  DX-HIP-UNILATERAL-WITH PELVIS-1 VIEW RIGHT   Final Result      No radiographic evidence of acute traumatic injury right hip.      DX-CHEST-PORTABLE (1 VIEW)   Final Result      Hypoinflation without other evidence for acute cardiopulmonary disease.      MR-BRAIN-W/O   Final Result      1.  MRI of the brain without contrast within normal limits for age with mild white matter changes.   2.  Chronic appearing sinus disease   3.  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 This does not have an aggressive appearance.      US-EXTREMITY VENOUS LOWER BILAT   Final Result      CT-HAND WITH RIGHT   Final Result      1.  Edema in the dorsum of the forearm and hand without a discrete fluid collection to suggest abscess is identified.      US-CAROTID DOPPLER BILAT   Final Result      DX-LUMBAR SPINE-2 OR 3 VIEWS   Final Result         Stable compression deformity in the L1 vertebral body.      CT-CTA CHEST PULMONARY ARTERY W/ RECONS   Final Result         1. No CT evidence of pulmonary embolism.      2. Old right rib fractures.      3. Cholelithiasis. Enlarged spleen with granulomas.      4. Mild superior endplate compression deformity of L1 could be acute. Correlate with point tenderness. No retropulsion. No  malalignment.      CT-CSPINE WITHOUT PLUS RECONS   Final Result         1. No acute fracture from C1 through T3 is visualized.         CT-HEAD W/O   Final Result         1. No acute intracranial abnormality. No evidence of acute intracranial hemorrhage or mass lesion.               DX-THORACIC SPINE-WITH SWIMMERS VIEW   Final Result      No acute thoracic spine fracture or subluxation identified.      DX-CHEST-PORTABLE (1 VIEW)   Final Result      No acute cardiac or pulmonary abnormality is noted.           Assessment/Plan  * Orthostatic hypotension- (present on admission)   Assessment & Plan    Possibly secondary to dysautonomia secondary to diabetes. Recently admitted for this issue for almost a mohan  Ongoing despite midodrine, fludrocortisone, mestinon, reglan, vit B12 and solumedrol, salt tabs, compression stockings  PT/OT   Recliner wheelchair, larry, and bedside commode have been ordered by previous attending   Attempt was made to work with SW to  outpatient prescription of droxidopa so we may start in house but it is unfortunately a very specialized medication.  Previous provider discussed with Dr. Childress about neurology assistance with starting this medication but he recommended outpatient follow up  Ultimate plan is to arrange for a safe discharge with patient's current condition and severe symptoms which are not being controlled with above measures yet    Patient has been noticed to have marked systolic hypertension after the dose of fludrocortisone in the morning, the dose was moved to 10 AM 6 AM on 1/26/2019   Changed midodrine dosing to be giving during the day time while the patient is being upright on 1/27/2019   Getting physical and occupational therapy, try to encourage physical endurance as much as possible.     Suicidal ideation   Assessment & Plan    On 1/23/2019 patient expressed that he is having thoughts of harming himself.    He reports that he has been depressed however not open about  "it.    He reports that he has a plan to kill himself by cutting his wrist.    He reports that he had tried cutting his right wrist around 1-1/2 months before.   Patient has been placed on a legal hold, psychiatry consulted.    Currently on fluoxetine 40 mg and Aripiprazole (Abilify) 5 mg.     Still expressing depression and suicidal ideation.     Fall from ground level   Assessment & Plan    Fell while sitting at bedside on 1/9. See above       Lumbar compression fracture (HCC)   Assessment & Plan    Pain management as needed.    Neurosurgery recommended TLSO brace.   PT OT recommended home health.    F/u with [facility]      Psychogenic nonepileptic seizure- (present on admission)   Assessment & Plan    Pt had seizure like episode after he fell  Has been evaluated by neuro and normal EEG even during \"episodes\"  AVOID benzodiazepines       Vitamin B12 deficiency- (present on admission)   Assessment & Plan    Started intramuscular injections      Syncope- (present on admission)   Assessment & Plan    Due to orthostasis.    CT of the head unremarkable. MRI brain showed chronic changes  Carotid Doppler did not reveal significant stenosis   Recent TTE showed normal EF, mild TR and MR   1/9: syncopal episode while sitting at edge of bed.    Pt has been advised to not sit up without assistance       Debility- (present on admission)   Assessment & Plan    Multifactorial, age, obesity, nutrition   PT/OT > wheel chair 22\", ordered, delivered being used by the patient     Acute respiratory failure with hypoxia (HCC)   Assessment & Plan    Resolved     Chronic diastolic heart failure (HCC)- (present on admission)   Assessment & Plan    Without current exacerbation.    Chronic lower extremity edema -chronic venous insufficiency        Bilateral leg edema- (present on admission)   Assessment & Plan    Appears to have chronic edematous changes.  Likely secondary to venous insufficiency.    Recent TTE showed intact EF  Lasix has " been held for orthostatic hypotension  Ultrasound for DVT was study was done and it was negative for DVT   Compression stockings ordered, encouraged and emphasized, poor implementation      Venous stasis dermatitis of both lower extremities- (present on admission)   Assessment & Plan    Holding lasix  Compression stockings      Paroxysmal A-fib (HCC)- (present on admission)   Assessment & Plan    Continue xarelto and amiodarone  Sinus rhythm   Patient's [id number] is 3 indicating 4.6% of stroke/tia/systemic embolisation per year.   HASBLED score is 2 indicating 4.1% bleeding risk.   Due to patient's significant orthostatic hypotension and recurrent falls the risks of bleeding outweigh the benefit of anticoagulation at this point of time.  I discussed this in detail with patient and he is agreeable.    Educated patient that if at some point in time his orthostatic hypotension and falls resolve/improve then at that time might be a good time to resume anticoagulation.  I also discussed with him that if he did want to resume anticoagulation that I would recommend warfarin as there is a reversal agent for this.  Currently on aspirin       Essential hypertension- (present on admission)   Assessment & Plan    Lasix held for orthostatic hypotension    Midodrine will result in supine hypertension but this is necessary to try to optomize orthostatic hypotension        Mixed hyperlipidemia- (present on admission)   Assessment & Plan    Continue current therapy            VTE prophylaxis: SCDs

## 2019-01-27 NOTE — PROGRESS NOTES
Received alert and oriented x 4. Legal hold 2000 1:1 sitter SI precaution. Check vitals sign and recorded accordingly and due med given per MAR. Medicated per MAR and reassessed every 2 hours per protocol. Still c/o mid back pain, offered medicine. Call light within reach. Bed alarm in placed. Needs attended. Will continue to monitor./48   Pulse 63   Temp 36.1 °C (97 °F) (Temporal)   Resp 17   Ht 1.829 m (6')   Wt 106.8 kg (235 lb 7.2 oz)   SpO2 93%   BMI 31.93 kg/m² .

## 2019-01-27 NOTE — PROGRESS NOTES
Patient resting in bed comfortably. Pain medication administered as ordered. Patient still verbalizing feelings of wanting to end life with plan. Fall precautions in place. Call light within reach. Sitter at bedside.

## 2019-01-28 PROCEDURE — A9270 NON-COVERED ITEM OR SERVICE: HCPCS | Performed by: HOSPITALIST

## 2019-01-28 PROCEDURE — A9270 NON-COVERED ITEM OR SERVICE: HCPCS | Performed by: INTERNAL MEDICINE

## 2019-01-28 PROCEDURE — 700102 HCHG RX REV CODE 250 W/ 637 OVERRIDE(OP): Performed by: HOSPITALIST

## 2019-01-28 PROCEDURE — 99225 PR SUBSEQUENT OBSERVATION CARE,LEVEL II: CPT | Performed by: HOSPITALIST

## 2019-01-28 PROCEDURE — 700102 HCHG RX REV CODE 250 W/ 637 OVERRIDE(OP): Performed by: INTERNAL MEDICINE

## 2019-01-28 PROCEDURE — A9270 NON-COVERED ITEM OR SERVICE: HCPCS | Performed by: PSYCHIATRY & NEUROLOGY

## 2019-01-28 PROCEDURE — G0378 HOSPITAL OBSERVATION PER HR: HCPCS

## 2019-01-28 PROCEDURE — 700102 HCHG RX REV CODE 250 W/ 637 OVERRIDE(OP): Performed by: PSYCHIATRY & NEUROLOGY

## 2019-01-28 RX ADMIN — NYSTATIN: 100000 POWDER TOPICAL at 04:55

## 2019-01-28 RX ADMIN — PYRIDOSTIGMINE BROMIDE 90 MG: 60 TABLET ORAL at 04:56

## 2019-01-28 RX ADMIN — ASPIRIN 81 MG 81 MG: 81 TABLET ORAL at 04:57

## 2019-01-28 RX ADMIN — MIDODRINE HYDROCHLORIDE 10 MG: 5 TABLET ORAL at 08:46

## 2019-01-28 RX ADMIN — PYRIDOSTIGMINE BROMIDE 90 MG: 60 TABLET ORAL at 17:54

## 2019-01-28 RX ADMIN — SODIUM CHLORIDE TAB 1 GM 1 G: 1 TAB at 12:54

## 2019-01-28 RX ADMIN — METOCLOPRAMIDE HYDROCHLORIDE 10 MG: 10 TABLET ORAL at 17:54

## 2019-01-28 RX ADMIN — PYRIDOSTIGMINE BROMIDE 90 MG: 60 TABLET ORAL at 12:54

## 2019-01-28 RX ADMIN — OXYCODONE HYDROCHLORIDE 5 MG: 5 TABLET ORAL at 17:53

## 2019-01-28 RX ADMIN — GUAIFENESIN 600 MG: 600 TABLET, EXTENDED RELEASE ORAL at 04:58

## 2019-01-28 RX ADMIN — METOCLOPRAMIDE HYDROCHLORIDE 10 MG: 10 TABLET ORAL at 12:54

## 2019-01-28 RX ADMIN — METOCLOPRAMIDE HYDROCHLORIDE 10 MG: 10 TABLET ORAL at 05:00

## 2019-01-28 RX ADMIN — OXYCODONE HYDROCHLORIDE 5 MG: 5 TABLET ORAL at 08:54

## 2019-01-28 RX ADMIN — PRAVASTATIN SODIUM 20 MG: 20 TABLET ORAL at 17:55

## 2019-01-28 RX ADMIN — OXYCODONE HYDROCHLORIDE AND ACETAMINOPHEN 500 MG: 500 TABLET ORAL at 04:58

## 2019-01-28 RX ADMIN — FLUOXETINE HYDROCHLORIDE 40 MG: 20 CAPSULE ORAL at 04:57

## 2019-01-28 RX ADMIN — FLUDROCORTISONE ACETATE 0.3 MG: 0.1 TABLET ORAL at 04:56

## 2019-01-28 RX ADMIN — MIDODRINE HYDROCHLORIDE 10 MG: 5 TABLET ORAL at 12:54

## 2019-01-28 RX ADMIN — Medication 400 MG: at 04:58

## 2019-01-28 RX ADMIN — SODIUM CHLORIDE TAB 1 GM 1 G: 1 TAB at 08:46

## 2019-01-28 RX ADMIN — ARIPIPRAZOLE 5 MG: 10 TABLET ORAL at 04:57

## 2019-01-28 RX ADMIN — MULTIPLE VITAMINS W/ MINERALS TAB 1 TABLET: TAB at 04:58

## 2019-01-28 RX ADMIN — AMIODARONE HYDROCHLORIDE 50 MG: 200 TABLET ORAL at 04:58

## 2019-01-28 RX ADMIN — MIDODRINE HYDROCHLORIDE 10 MG: 5 TABLET ORAL at 17:54

## 2019-01-28 RX ADMIN — SODIUM CHLORIDE TAB 1 GM 1 G: 1 TAB at 17:54

## 2019-01-28 ASSESSMENT — ENCOUNTER SYMPTOMS
SORE THROAT: 0
BACK PAIN: 1
NECK PAIN: 0
SEIZURES: 0
NAUSEA: 0
CHILLS: 0
COUGH: 0
DIZZINESS: 1
LOSS OF CONSCIOUSNESS: 0
STRIDOR: 0
BLURRED VISION: 0
FEVER: 0
HEADACHES: 0
SHORTNESS OF BREATH: 0
DEPRESSION: 1
ABDOMINAL PAIN: 0
DOUBLE VISION: 0

## 2019-01-28 NOTE — PROGRESS NOTES
Hospital Medicine Daily Progress Note    Date of Service  1/28/2019    Chief Complaint  53 y.o. male admitted 12/28/2018 with syncope.    Hospital Course      PMHx CHD, DM, HTN, HLD, and recent hospitalization for afib s/p cardioversion who had difficult to control orthostatic hypotension at that time. He had right and left cardiac cath at that time and no cardiac cause of orthostatic hypotension was identified. He was discharged on florinef, salt tabs, midodrine.  He presented with syncope while walking and back pain. He was found with L1 compression fracture. Dr Pepper with NSG recommended TLSO and management for osteoporosis. Had had CT head and carotid doppler that was unremarkable. Recent TTE showed normal EF with mild MR and TR. MRI brain showed chronic changes. TSH was normal. He has orthostatic hypotension that's been difficult to correct despite multiple medications.   He has chronic leg swelling that has been worse since lasix stopped for hypotension. Doppler was negative for DVT bilaterally. He was noted to have right hand cellulitis and treated with clindamycin.  He continues to feel dizzy at times even when laying down. Suffered from a syncopal episode while sitting at edge of bed resulting in fall. Code blue was called as patient was unresponsive for several seconds. This followed by a psychogenic non epileptic seizure. Luckily patient did not suffer from any significant trauma from this fall. Upon discussion, patient's xarelto was discontinued as we both felt the risks of bleeding at this time outweigh the benefits due to his recurrent falls. Attempt was made to get droxidopa as an outpatient started in the hospital but this is a specialized medication which requires documents to be completed for initiation.  Recliner wheelchair, larry lift, and bedside commode has been arranged for patient and he understands he will likely need to stay in that position due to his debilitating orthostatic hypotension.  He will also need outpatient arrangement of some orthostatic hypotension specialist.          Interval Problem Update  We are having less systolic blood pressure spikes since moving fludrocortisone to 10 AM, and giving midodrine only during the day, 8 AM, 12 AM and 4 PM.  Still having orthostatic hypotension  150s dropping to mid 90s systolic, 80s dropping to mid 60s diastolic with an increase in heart rate response on more than 40 beats per minute   Encouraged try to ambulate with his wheelchair with supervision to gradually increase endurance as recommended by physical and Occupational Therapy.  Continues to feel depressed and expressing suicidal ideation psychiatry have extended his legal hold on following..  Currently on fluoxetine 40 mg and Aripiprazole (Abilify) 5 mg.    Discussed with patient, patient's nurse and with multidisciplinary team during rounds including , pharmacist and charge nurse.     Consultants/Specialty  Psychiatry     Code Status  Full    Disposition  TBD     Review of Systems  Review of Systems   Constitutional: Negative for chills and fever.   HENT: Negative for congestion and sore throat.    Eyes: Negative for blurred vision and double vision.   Respiratory: Negative for cough, shortness of breath and stridor.    Cardiovascular: Positive for leg swelling. Negative for chest pain.   Gastrointestinal: Negative for abdominal pain and nausea.   Genitourinary: Negative for dysuria and urgency.   Musculoskeletal: Positive for back pain. Negative for neck pain.   Skin: Negative for rash.   Neurological: Positive for dizziness (On standing). Negative for seizures, loss of consciousness and headaches.   Psychiatric/Behavioral: Positive for depression and suicidal ideas.        Physical Exam  Temp:  [36 °C (96.8 °F)-36.9 °C (98.4 °F)] 36.9 °C (98.4 °F)  Pulse:  [] 60  Resp:  [17-18] 18  BP: ()/(64-90) 135/70  SpO2:  [92 %-96 %] 92 %    Physical Exam   Constitutional: He is  oriented to person, place, and time. He appears well-developed and well-nourished.   HENT:   Head: Normocephalic.   Mouth/Throat: Oropharynx is clear and moist.   Eyes: Conjunctivae are normal. Right eye exhibits no discharge. Left eye exhibits no discharge.   Neck: Neck supple. No JVD present.   Cardiovascular: Normal rate and regular rhythm.    No murmur heard.  Pulmonary/Chest: Effort normal and breath sounds normal. No respiratory distress. He has no wheezes.   Abdominal: Soft. He exhibits no distension. There is no tenderness.   Musculoskeletal: He exhibits edema (Marked B/L LE edema ). He exhibits no tenderness.   Neurological: He is alert and oriented to person, place, and time.   Skin: Skin is warm and dry.   Psychiatric:   Depressed, expressing suicidal ideation   Nursing note and vitals reviewed.    Fluids    Intake/Output Summary (Last 24 hours) at 01/28/19 1706  Last data filed at 01/28/19 1511   Gross per 24 hour   Intake              480 ml   Output             1550 ml   Net            -1070 ml     Laboratory                        Imaging  DX-HIP-UNILATERAL-WITH PELVIS-1 VIEW RIGHT   Final Result      No radiographic evidence of acute traumatic injury right hip.      DX-CHEST-PORTABLE (1 VIEW)   Final Result      Hypoinflation without other evidence for acute cardiopulmonary disease.      MR-BRAIN-W/O   Final Result      1.  MRI of the brain without contrast within normal limits for age with mild white matter changes.   2.  Chronic appearing sinus disease   3.  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 This does not have an aggressive appearance.      US-EXTREMITY VENOUS LOWER BILAT   Final Result      CT-HAND WITH RIGHT   Final Result      1.  Edema in the dorsum of the forearm and hand without a discrete fluid collection to suggest abscess is identified.      US-CAROTID DOPPLER BILAT   Final Result      DX-LUMBAR SPINE-2 OR 3 VIEWS   Final Result          Stable compression deformity in the L1 vertebral body.      CT-CTA CHEST PULMONARY ARTERY W/ RECONS   Final Result         1. No CT evidence of pulmonary embolism.      2. Old right rib fractures.      3. Cholelithiasis. Enlarged spleen with granulomas.      4. Mild superior endplate compression deformity of L1 could be acute. Correlate with point tenderness. No retropulsion. No malalignment.      CT-CSPINE WITHOUT PLUS RECONS   Final Result         1. No acute fracture from C1 through T3 is visualized.         CT-HEAD W/O   Final Result         1. No acute intracranial abnormality. No evidence of acute intracranial hemorrhage or mass lesion.               DX-THORACIC SPINE-WITH SWIMMERS VIEW   Final Result      No acute thoracic spine fracture or subluxation identified.      DX-CHEST-PORTABLE (1 VIEW)   Final Result      No acute cardiac or pulmonary abnormality is noted.           Assessment/Plan  * Orthostatic hypotension- (present on admission)   Assessment & Plan    Possibly secondary to dysautonomia secondary to diabetes. Recently admitted for this issue for almost a mohan  Ongoing despite midodrine, fludrocortisone, mestinon, reglan, vit B12 and solumedrol, salt tabs, compression stockings  PT/OT   Recliner wheelchair, larry, and bedside commode have been ordered by previous attending   Attempt was made to work with SW to  outpatient prescription of droxidopa so we may start in house but it is unfortunately a very specialized medication.  Previous provider discussed with Dr. Childress about neurology assistance with starting this medication but he recommended outpatient follow up  Ultimate plan is to arrange for a safe discharge with patient's current condition and severe symptoms which are not being controlled with above measures yet    Patient has been noticed to have marked systolic hypertension after the dose of fludrocortisone in the morning, the dose was moved to 10 AM 6 AM on 1/26/2019   Changed  "midodrine dosing to be giving during the day time while the patient is being upright on 1/27/2019  Less systolic blood pressure spikes since moving fludrocortisone to 10 AM, and giving midodrine only during the day, 8 AM, 12 AM and 4 PM.    Getting physical and occupational therapy, try to encourage physical endurance as much as possible.       Suicidal ideation   Assessment & Plan    On 1/23/2019 patient expressed that he is having thoughts of harming himself.    He reports that he has been depressed however not open about it.    He reports that he has a plan to kill himself by cutting his wrist.    He reports that he had tried cutting his right wrist around 1-1/2 months before.   Patient has been placed on a legal hold, psychiatry consulted.    Currently on fluoxetine 40 mg and Aripiprazole (Abilify) 5 mg.     Still expressing depression and suicidal ideation.      Fall from ground level   Assessment & Plan    Fell while sitting at bedside on 1/9. See above        Lumbar compression fracture (HCC)   Assessment & Plan    Pain management as needed.    Neurosurgery recommended TLSO brace.   PT OT recommended home health.    F/u with [facility]      Psychogenic nonepileptic seizure- (present on admission)   Assessment & Plan    Pt had seizure like episode after he fell  Has been evaluated by neuro and normal EEG even during \"episodes\"  AVOID benzodiazepines       Vitamin B12 deficiency- (present on admission)   Assessment & Plan    Started intramuscular injections      Syncope- (present on admission)   Assessment & Plan    Due to orthostasis.    CT of the head unremarkable. MRI brain showed chronic changes  Carotid Doppler did not reveal significant stenosis   Recent TTE showed normal EF, mild TR and MR   1/9: syncopal episode while sitting at edge of bed.    Pt has been advised to not sit up without assistance    Encouraged try to ambulate with his wheelchair with supervision to gradually increase endurance as " "recommended by physical and Occupational Therapy.       Debility- (present on admission)   Assessment & Plan    Multifactorial, age, obesity, nutrition   PT/OT > wheel chair 22\", ordered, delivered being used by the patient      Acute respiratory failure with hypoxia (HCC)   Assessment & Plan    Resolved     Chronic diastolic heart failure (HCC)- (present on admission)   Assessment & Plan    Without current exacerbation.    Chronic lower extremity edema -chronic venous insufficiency          Bilateral leg edema- (present on admission)   Assessment & Plan    Appears to have chronic edematous changes.  Likely secondary to venous insufficiency.    Recent TTE showed intact EF  Lasix has been held for orthostatic hypotension  Ultrasound for DVT was study was done and it was negative for DVT   Compression stockings ordered, encouraged and emphasized, poor implementation       Venous stasis dermatitis of both lower extremities- (present on admission)   Assessment & Plan    Holding lasix  Compression stockings      Paroxysmal A-fib (HCC)- (present on admission)   Assessment & Plan    Continue xarelto and amiodarone   Sinus rhythm   Patient's [id number] is 3 indicating 4.6% of stroke/tia/systemic embolisation per year.   HASBLED score is 2 indicating 4.1% bleeding risk.   Due to patient's significant orthostatic hypotension and recurrent falls the risks of bleeding outweigh the benefit of anticoagulation at this point of time.  I discussed this in detail with patient and he is agreeable.    Educated patient that if at some point in time his orthostatic hypotension and falls resolve/improve then at that time might be a good time to resume anticoagulation.  I also discussed with him that if he did want to resume anticoagulation that I would recommend warfarin as there is a reversal agent for this.  Currently on aspirin       Essential hypertension- (present on admission)   Assessment & Plan    Lasix held for orthostatic " hypotension    Midodrine will result in supine hypertension  Less systolic blood pressure spikes since moving fludrocortisone to 10 AM, and giving midodrine only during the day, 8 AM, 12 AM and 4 PM.      Mixed hyperlipidemia- (present on admission)   Assessment & Plan    Continue current therapy            VTE prophylaxis: SCDs

## 2019-01-28 NOTE — CARE PLAN
Problem: Safety  Goal: Will remain free from falls    Intervention: Implement fall precautions   01/28/19 0023   OTHER   Environmental Precautions Treaded Slipper Socks on Patient;Personal Belongings, Wastebasket, Call Bell etc. in Easy Reach;Transferred to Stronger Side;Report Given to Other Health Care Providers Regarding Fall Risk   Chair/Bed Strip Alarm Yes - Alarm On         Problem: Discharge Barriers/Planning  Goal: Patient's continuum of care needs will be met    Intervention: Collaborate with Transitional Care Team and Interdisciplinary Team to meet discharge needs  Awaiting placement, on Legal hold 2000 for SI.

## 2019-01-28 NOTE — PROGRESS NOTES
Pharmacy Pharmacotherapy Consult for LOS >30 days    Admit Date: 12/28/2018      Medications were reviewed for appropriateness and ongoing need.     Current Facility-Administered Medications   Medication Dose Route Frequency Provider Last Rate Last Dose   • midodrine (PROAMATINE) tablet 10 mg  10 mg Oral DAILY Catrachita Palencia M.D.   10 mg at 01/28/19 0846   • midodrine (PROAMATINE) tablet 10 mg  10 mg Oral DAILY Catrachita Palencia M.D.   10 mg at 01/27/19 1143   • midodrine (PROAMATINE) tablet 10 mg  10 mg Oral DAILY Catrachita Palencia M.D.   10 mg at 01/27/19 1559   • fludrocortisone (FLORINEF) tablet 0.3 mg  0.3 mg Oral QAM Catrachita Palencia M.D.   0.3 mg at 01/28/19 0456   • FLUoxetine (PROZAC) capsule 40 mg  40 mg Oral DAILY Lena Patel M.D.   40 mg at 01/28/19 0457   • ARIPiprazole (ABILIFY) tablet 5 mg  5 mg Oral DAILY Lena Patel M.D.   5 mg at 01/28/19 0457   • aspirin (ASA) chewable tab 81 mg  81 mg Oral DAILY Lesley Fregoso M.D.   81 mg at 01/28/19 0457   • Respiratory Care per Protocol   Nebulization Continuous RT Lesley Fregoso M.D.       • guaiFENesin LA (MUCINEX) tablet 600 mg  600 mg Oral Q12HRS Lesley Fregoso M.D.   600 mg at 01/28/19 0458   • metoclopramide (REGLAN) tablet 10 mg  10 mg Oral TID AC Jose Love M.D.   10 mg at 01/28/19 0500   • pyridostigmine (MESTINON) tablet 90 mg  90 mg Oral TID Jose Love M.D.   90 mg at 01/28/19 0456   • sodium chloride (SALT) tablet 1 g  1 g Oral TID WITH MEALS Jose Love M.D.   1 g at 01/28/19 0846   • therapeutic multivitamin-minerals (THERAGRAN-M) tablet 1 Tab  1 Tab Oral DAILY Colt Denton M.D.   1 Tab at 01/28/19 0458   • ascorbic acid tablet 500 mg  500 mg Oral DAILY Colt Denton M.D.   500 mg at 01/28/19 0458   • magnesium oxide (MAG-OX) tablet 400 mg  400 mg Oral DAILY Colt Denton M.D.   400 mg at 01/28/19 0458   • nystatin (MYCOSTATIN) powder   Topical BID Colt Denton M.D.       • amiodarone  (CORDARONE) tablet 50 mg  50 mg Oral DAILY Aaron Wheeler M.D.   50 mg at 01/28/19 0458   • pravastatin (PRAVACHOL) tablet 20 mg  20 mg Oral DAILY Aaron Wheeler M.D.   20 mg at 01/27/19 1748   • acetaminophen (TYLENOL) tablet 650 mg  650 mg Oral Q6HRS PRN Aaron Wheeler M.D.   650 mg at 01/24/19 0526   • ondansetron (ZOFRAN) syringe/vial injection 4 mg  4 mg Intravenous Q4HRS PRN Aaron Wheeler M.D.       • ondansetron (ZOFRAN ODT) dispertab 4 mg  4 mg Oral Q4HRS PRN Aaron Wheeler M.D.       • promethazine (PHENERGAN) tablet 12.5-25 mg  12.5-25 mg Oral Q4HRS PRN Aaron Wheeler M.D.       • promethazine (PHENERGAN) suppository 12.5-25 mg  12.5-25 mg Rectal Q4HRS PRN Aaron Wheeler M.D.       • prochlorperazine (COMPAZINE) injection 5-10 mg  5-10 mg Intravenous Q4HRS PRN Aaron Wheeler M.D.       • senna-docusate (PERICOLACE or SENOKOT S) 8.6-50 MG per tablet 2 Tab  2 Tab Oral BID Aaron Wheeler M.D.   Stopped at 01/25/19 0600    And   • polyethylene glycol/lytes (MIRALAX) PACKET 1 Packet  1 Packet Oral QDAY PRN Aaron Wheeler M.D.        And   • magnesium hydroxide (MILK OF MAGNESIA) suspension 30 mL  30 mL Oral QDAY PRN Aaron Wheeler M.D.        And   • bisacodyl (DULCOLAX) suppository 10 mg  10 mg Rectal QDAY PRN Aaron Wheeler M.D.       • Pharmacy Consult Request ...Pain Management Review 1 Each  1 Each Other PRN Colt Denton M.D.       • oxyCODONE immediate-release (ROXICODONE) tablet 5 mg  5 mg Oral Q3HRS PRN Colt Denton M.D.   5 mg at 01/28/19 0854   • oxyCODONE immediate-release (ROXICODONE) tablet 2.5 mg  2.5 mg Oral Q3HRS PRN Colt Denton M.D.   2.5 mg at 01/25/19 0850       Recommendations:  Recommend discontinue mucinex, nystatin and prn medications that haven't been used >2 weeks.  Also patient has been on magnesium daily, recommend checking magnesium level.    Julia Bravo, Pharm.D, BCPS, BCCP

## 2019-01-28 NOTE — PROGRESS NOTES
Assumed patient care at 0700. Received report from night shift. Assessment completed. A&Ox 4. C/o of 7/10 back pain, medicated per MAR. Orthostatics bp done, positive-see flowsheet, pt denies dizziness. High fall risk, precautions in place; alarm on, treaded socks in place, personal possessions and call light placed within reach, equipment away from bedside. Up to chair for meal. POC discussed with pt, communication board updated. On legal hold for SI, continues to verbalize wanting to end life. 1:1 sitter at bedside. Patient denies any additional needs at this time.

## 2019-01-28 NOTE — CARE PLAN
Problem: Pain Management  Goal: Pain level will decrease to patient's comfort goal    Intervention: Follow pain managment plan developed in collaboration with patient and Interdisciplinary Team  Back pain managed with PRN oxycodone. Patient reports adequate relief. Will continue to assess.       Problem: Venous Thromboembolism (VTW)/Deep Vein Thrombosis (DVT) Prevention:  Goal: Patient will participate in Venous Thrombosis (VTE)/Deep Vein Thrombosis (DVT)Prevention Measures    Intervention: Ensure patient wears graduated elastic stockings (RADHA hose) and/or SCDs, if ordered, when in bed or chair (Remove at least once per shift for skin check)  RADHA hose in place. Patient compliant with use.

## 2019-01-28 NOTE — PROGRESS NOTES
Received alert and oriented x 4. On Legal hold for SI with 1:1 sitter. Check vitals sign and recorded accordingly and due med given per MAR. Monitor sign and symptoms of respiratory distress and treatment given accordingly per MAR.Medicated per MAR and reassessed every 2 hours per protocol. Had shower tonight. Call light within reach. Bed alarm in placed. Needs attended. Will continue to monitor./68   Pulse (!) 57   Temp 36 °C (96.8 °F) (Temporal)   Resp 18   Ht 1.829 m (6')   Wt 106.8 kg (235 lb 7.2 oz)   SpO2 93%   BMI 31.93 kg/m² .

## 2019-01-29 PROCEDURE — G0378 HOSPITAL OBSERVATION PER HR: HCPCS

## 2019-01-29 PROCEDURE — A9270 NON-COVERED ITEM OR SERVICE: HCPCS | Performed by: HOSPITALIST

## 2019-01-29 PROCEDURE — A9270 NON-COVERED ITEM OR SERVICE: HCPCS | Performed by: INTERNAL MEDICINE

## 2019-01-29 PROCEDURE — 700102 HCHG RX REV CODE 250 W/ 637 OVERRIDE(OP): Performed by: HOSPITALIST

## 2019-01-29 PROCEDURE — 99225 PR SUBSEQUENT OBSERVATION CARE,LEVEL II: CPT | Performed by: HOSPITALIST

## 2019-01-29 PROCEDURE — 700102 HCHG RX REV CODE 250 W/ 637 OVERRIDE(OP): Performed by: INTERNAL MEDICINE

## 2019-01-29 PROCEDURE — 99214 OFFICE O/P EST MOD 30 MIN: CPT | Performed by: PSYCHIATRY & NEUROLOGY

## 2019-01-29 RX ORDER — FLUOXETINE HYDROCHLORIDE 20 MG/1
60 CAPSULE ORAL DAILY
Status: DISCONTINUED | OUTPATIENT
Start: 2019-01-30 | End: 2019-02-27 | Stop reason: HOSPADM

## 2019-01-29 RX ORDER — CLOBETASOL PROPIONATE 0.5 MG/G
CREAM TOPICAL 2 TIMES DAILY
Status: DISCONTINUED | OUTPATIENT
Start: 2019-01-29 | End: 2019-01-29

## 2019-01-29 RX ADMIN — SENNOSIDES AND DOCUSATE SODIUM 2 TABLET: 8.6; 5 TABLET ORAL at 05:25

## 2019-01-29 RX ADMIN — MIDODRINE HYDROCHLORIDE 10 MG: 5 TABLET ORAL at 08:04

## 2019-01-29 RX ADMIN — MIDODRINE HYDROCHLORIDE 10 MG: 5 TABLET ORAL at 17:32

## 2019-01-29 RX ADMIN — PYRIDOSTIGMINE BROMIDE 90 MG: 60 TABLET ORAL at 11:12

## 2019-01-29 RX ADMIN — PYRIDOSTIGMINE BROMIDE 90 MG: 60 TABLET ORAL at 17:32

## 2019-01-29 RX ADMIN — OXYCODONE HYDROCHLORIDE 5 MG: 5 TABLET ORAL at 11:11

## 2019-01-29 RX ADMIN — SODIUM CHLORIDE TAB 1 GM 1 G: 1 TAB at 08:04

## 2019-01-29 RX ADMIN — MULTIPLE VITAMINS W/ MINERALS TAB 1 TABLET: TAB at 05:23

## 2019-01-29 RX ADMIN — PYRIDOSTIGMINE BROMIDE 90 MG: 60 TABLET ORAL at 05:25

## 2019-01-29 RX ADMIN — MIDODRINE HYDROCHLORIDE 10 MG: 5 TABLET ORAL at 11:13

## 2019-01-29 RX ADMIN — ASPIRIN 81 MG 81 MG: 81 TABLET ORAL at 05:21

## 2019-01-29 RX ADMIN — ARIPIPRAZOLE 5 MG: 10 TABLET ORAL at 05:22

## 2019-01-29 RX ADMIN — SODIUM CHLORIDE TAB 1 GM 1 G: 1 TAB at 11:13

## 2019-01-29 RX ADMIN — OXYCODONE HYDROCHLORIDE 5 MG: 5 TABLET ORAL at 01:39

## 2019-01-29 RX ADMIN — METOCLOPRAMIDE HYDROCHLORIDE 10 MG: 10 TABLET ORAL at 11:14

## 2019-01-29 RX ADMIN — AMIODARONE HYDROCHLORIDE 50 MG: 200 TABLET ORAL at 05:22

## 2019-01-29 RX ADMIN — OXYCODONE HYDROCHLORIDE AND ACETAMINOPHEN 500 MG: 500 TABLET ORAL at 05:22

## 2019-01-29 RX ADMIN — METOCLOPRAMIDE HYDROCHLORIDE 10 MG: 10 TABLET ORAL at 17:32

## 2019-01-29 RX ADMIN — SODIUM CHLORIDE TAB 1 GM 1 G: 1 TAB at 17:33

## 2019-01-29 RX ADMIN — Medication 400 MG: at 05:21

## 2019-01-29 RX ADMIN — OXYCODONE HYDROCHLORIDE 5 MG: 5 TABLET ORAL at 05:19

## 2019-01-29 RX ADMIN — FLUOXETINE HYDROCHLORIDE 40 MG: 20 CAPSULE ORAL at 05:21

## 2019-01-29 RX ADMIN — METOCLOPRAMIDE HYDROCHLORIDE 10 MG: 10 TABLET ORAL at 05:22

## 2019-01-29 RX ADMIN — OXYCODONE HYDROCHLORIDE 5 MG: 5 TABLET ORAL at 17:40

## 2019-01-29 ASSESSMENT — ENCOUNTER SYMPTOMS
FEVER: 0
BLOOD IN STOOL: 0
HEADACHES: 0
VOMITING: 0
BACK PAIN: 1
HEARTBURN: 0
HEMOPTYSIS: 0
BRUISES/BLEEDS EASILY: 0
NEUROLOGICAL NEGATIVE: 1
CHILLS: 0
NERVOUS/ANXIOUS: 0
COUGH: 0
DIAPHORESIS: 0
DIZZINESS: 0
LOSS OF CONSCIOUSNESS: 0
DIARRHEA: 0
DOUBLE VISION: 0
ABDOMINAL PAIN: 0
WHEEZING: 0
FOCAL WEAKNESS: 0
NAUSEA: 0
CONSTIPATION: 0
CONSTITUTIONAL NEGATIVE: 1
EYES NEGATIVE: 1
SEIZURES: 0
GASTROINTESTINAL NEGATIVE: 1
RESPIRATORY NEGATIVE: 1
PALPITATIONS: 0
CARDIOVASCULAR NEGATIVE: 1

## 2019-01-29 NOTE — PROGRESS NOTES
Hospital Medicine Daily Progress Note    Date of Service  1/29/2019    Chief Complaint  53 y.o. male admitted 12/28/2018 with suicide ideation    Hospital Course    Patient has been admitted now since the last month and he has had suicidal ideation.  Patient at this point unfortunately continues to be suicidal.  Medications at this point are being actively adjusted.  The patient at this point will need a discharge plan medically at this point he is optimized and stable he does have chronic dysautonomia secondary to uncontrolled diabetes and because of this he is hypotensive.  The patient will need a wheelchair and medication management long-term to keep his blood pressure stable.      Interval Problem Update  Today the patient will need to be reevaluated psychiatry, I have spoken to them in detail as the patient believes his Abilify is making him more suicidal.  They will at this point stop the Abilify and adjust his medications.  In order for the medications to be adjusted I have discontinued the statin so that it does not interact with the antipsychotic medications.  Overall patient today seems to be more interactive and at this point he will also need adjustment of his wheelchair so that he can easily mobilize.    Consultants/Specialty  Psychiatry    Code Status  Full code    Disposition  To be determined    Review of Systems  Review of Systems   Constitutional: Negative.  Negative for chills, diaphoresis and fever.   HENT: Negative.    Eyes: Negative.  Negative for double vision.   Respiratory: Negative.  Negative for cough, hemoptysis and wheezing.    Cardiovascular: Negative.  Negative for chest pain, palpitations and leg swelling.   Gastrointestinal: Negative.  Negative for abdominal pain, blood in stool, constipation, diarrhea, heartburn, nausea and vomiting.   Genitourinary: Negative.  Negative for frequency, hematuria and urgency.   Musculoskeletal: Positive for back pain. Negative for joint pain.   Skin:  Negative.  Negative for itching and rash.   Neurological: Negative.  Negative for dizziness, focal weakness, seizures, loss of consciousness and headaches.   Endo/Heme/Allergies: Negative.  Does not bruise/bleed easily.   Psychiatric/Behavioral: Positive for suicidal ideas. The patient is not nervous/anxious.    All other systems reviewed and are negative.       Physical Exam  Temp:  [35.6 °C (96 °F)-36.8 °C (98.2 °F)] 36.4 °C (97.6 °F)  Pulse:  [59-75] 75  Resp:  [16-18] 16  BP: (117-161)/(64-86) 122/64  SpO2:  [93 %-94 %] 93 %    Physical Exam   Constitutional: He is oriented to person, place, and time. He appears well-developed and well-nourished.   HENT:   Head: Normocephalic and atraumatic.   Right Ear: External ear normal.   Left Ear: External ear normal.   Nose: Nose normal.   Mouth/Throat: Oropharynx is clear and moist.   Eyes: Pupils are equal, round, and reactive to light. Conjunctivae and EOM are normal.   Neck: Normal range of motion. Neck supple. No JVD present. No thyromegaly present.   Cardiovascular: Normal rate and regular rhythm.    No murmur heard.  Pulmonary/Chest: Effort normal and breath sounds normal. He has no wheezes. He has no rales. He exhibits no tenderness.   Abdominal: Soft. Bowel sounds are normal. He exhibits no distension and no mass. There is no tenderness. There is no rebound and no guarding.   Musculoskeletal: Normal range of motion. He exhibits no edema or tenderness.   Lymphadenopathy:     He has no cervical adenopathy.   Neurological: He is alert and oriented to person, place, and time. He has normal reflexes. No cranial nerve deficit.   Skin: Skin is warm and dry. No rash noted. No erythema. No pallor.   Psychiatric: His speech is normal and behavior is normal. Cognition and memory are normal. He exhibits a depressed mood. He expresses suicidal ideation.   Nursing note and vitals reviewed.      Fluids    Intake/Output Summary (Last 24 hours) at 01/29/19 3426  Last data filed  at 01/29/19 1330   Gross per 24 hour   Intake              540 ml   Output              200 ml   Net              340 ml       Laboratory                        Imaging  DX-HIP-UNILATERAL-WITH PELVIS-1 VIEW RIGHT   Final Result      No radiographic evidence of acute traumatic injury right hip.      DX-CHEST-PORTABLE (1 VIEW)   Final Result      Hypoinflation without other evidence for acute cardiopulmonary disease.      MR-BRAIN-W/O   Final Result      1.  MRI of the brain without contrast within normal limits for age with mild white matter changes.   2.  Chronic appearing sinus disease   3.  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 This does not have an aggressive appearance.      US-EXTREMITY VENOUS LOWER BILAT   Final Result      CT-HAND WITH RIGHT   Final Result      1.  Edema in the dorsum of the forearm and hand without a discrete fluid collection to suggest abscess is identified.      US-CAROTID DOPPLER BILAT   Final Result      DX-LUMBAR SPINE-2 OR 3 VIEWS   Final Result         Stable compression deformity in the L1 vertebral body.      CT-CTA CHEST PULMONARY ARTERY W/ RECONS   Final Result         1. No CT evidence of pulmonary embolism.      2. Old right rib fractures.      3. Cholelithiasis. Enlarged spleen with granulomas.      4. Mild superior endplate compression deformity of L1 could be acute. Correlate with point tenderness. No retropulsion. No malalignment.      CT-CSPINE WITHOUT PLUS RECONS   Final Result         1. No acute fracture from C1 through T3 is visualized.         CT-HEAD W/O   Final Result         1. No acute intracranial abnormality. No evidence of acute intracranial hemorrhage or mass lesion.               DX-THORACIC SPINE-WITH SWIMMERS VIEW   Final Result      No acute thoracic spine fracture or subluxation identified.      DX-CHEST-PORTABLE (1 VIEW)   Final Result      No acute cardiac or pulmonary abnormality is noted.            Assessment/Plan  * Orthostatic hypotension- (present on admission)   Assessment & Plan    Most likely secondary to dysautonomia.  The patient at this point had uncontrolled diabetes and this is most likely with led to this condition.  Patient at this point will need continued wheelchair modify mobility.  Continue with Midrin, continue with fludrocortisone, monitor blood pressure     Suicidal ideation   Assessment & Plan    Patient continues to report that he is suicidal he believes that the Abilify has made it worse.  We are discontinuing this.  I am also discontinuing at this point a statin for the patient so that psychiatry is able to make medication adjustments otherwise these medications may interact with statins adversely.     Fall from ground level   Assessment & Plan    Continue at this point with pain management optimization and therapies     Lumbar compression fracture (HCC)   Assessment & Plan    Continue with pain management and TLSO brace.     Psychogenic nonepileptic seizure- (present on admission)   Assessment & Plan    Appear to be pseudoseizures as the patient has no EEG events during seizure activity.  He has been extensively evaluated.     Vitamin B12 deficiency- (present on admission)   Assessment & Plan    Continue at this point with monthly B12 injections.     Syncope- (present on admission)   Assessment & Plan    Patient remains orthostatic secondary to dysautonomia.  The patient at this point does have a standard wheelchair Oscar will need to be modified so that the patient is able to utilize the wheelchair appropriately.  Patient has negative workup including MRI of the head carotids as well as echo.  Patient at this point remains on Midodrin as well as fludrocortisone.  He should work with occupational therapy and physical therapy.     Debility- (present on admission)   Assessment & Plan    Continue with therapies     Acute respiratory failure with hypoxia (HCC)   Assessment & Plan     Resolved     Chronic diastolic heart failure (HCC)- (present on admission)   Assessment & Plan    Continue with medical optimization monitor for fluid overload      Bilateral leg edema- (present on admission)   Assessment & Plan    Continue to elevate legs when sitting or laying down.  Continue to mobilize and allow at this point muscle circulation     Venous stasis dermatitis of both lower extremities- (present on admission)   Assessment & Plan    Continue with compression stockings     Paroxysmal A-fib (HCC)- (present on admission)   Assessment & Plan    Continue with amiodarone for rate control.  He is high risk for anticoagulation.     Essential hypertension- (present on admission)   Assessment & Plan    Holding blood pressure management as the patient is hypotensive     Mixed hyperlipidemia- (present on admission)   Assessment & Plan    Stop statin so that psychiatry can at this point adjust antipsychotic medications.          VTE prophylaxis: Heparin

## 2019-01-29 NOTE — PROGRESS NOTES
Report received from night shift RN. Assumed care of pt at 0700. Pt resting comfortably in bed and denies pain. Pt is A&Ox4, on room air. Assessment complete, labs reviewed. Pt expressing thoughts of wanting to end his life, 1:1 sitter is present. Plan of care and daily goals discussed with pt. Pt denies any needs at this time. Bed is in low and locked position, high fall risk precautions in place. Pt verbalizes understanding on how to use the call light. Hourly rounding in place.

## 2019-01-29 NOTE — PROGRESS NOTES
Received report from am RN at bedside, accepted care . Pt is calm and alert, speech is clear, PATHAK,  shows no signs of distress. Negative for headache, no N/V, Pt is breathing normally. No SOB, no, chest pain. Present bowel sounds and pt passing gas. Bowel protocol in place. UO WNL, skin shows no changes, 1-1 sitter at bedside at all times, No needs at this time. POC discussed. Bed in low position call bell within reach, half side rails up,. Pt resting quietly. Will continue to assess.

## 2019-01-30 PROCEDURE — A9270 NON-COVERED ITEM OR SERVICE: HCPCS | Performed by: HOSPITALIST

## 2019-01-30 PROCEDURE — 99225 PR SUBSEQUENT OBSERVATION CARE,LEVEL II: CPT | Performed by: HOSPITALIST

## 2019-01-30 PROCEDURE — 700102 HCHG RX REV CODE 250 W/ 637 OVERRIDE(OP): Performed by: HOSPITALIST

## 2019-01-30 PROCEDURE — 700102 HCHG RX REV CODE 250 W/ 637 OVERRIDE(OP): Performed by: INTERNAL MEDICINE

## 2019-01-30 PROCEDURE — A9270 NON-COVERED ITEM OR SERVICE: HCPCS | Performed by: PSYCHIATRY & NEUROLOGY

## 2019-01-30 PROCEDURE — A9270 NON-COVERED ITEM OR SERVICE: HCPCS | Performed by: INTERNAL MEDICINE

## 2019-01-30 PROCEDURE — G0378 HOSPITAL OBSERVATION PER HR: HCPCS

## 2019-01-30 PROCEDURE — 700102 HCHG RX REV CODE 250 W/ 637 OVERRIDE(OP): Performed by: PSYCHIATRY & NEUROLOGY

## 2019-01-30 RX ADMIN — OXYCODONE HYDROCHLORIDE AND ACETAMINOPHEN 500 MG: 500 TABLET ORAL at 06:09

## 2019-01-30 RX ADMIN — ASPIRIN 81 MG 81 MG: 81 TABLET ORAL at 06:09

## 2019-01-30 RX ADMIN — MIDODRINE HYDROCHLORIDE 10 MG: 5 TABLET ORAL at 16:13

## 2019-01-30 RX ADMIN — OXYCODONE HYDROCHLORIDE 5 MG: 5 TABLET ORAL at 16:13

## 2019-01-30 RX ADMIN — AMIODARONE HYDROCHLORIDE 50 MG: 200 TABLET ORAL at 06:09

## 2019-01-30 RX ADMIN — SODIUM CHLORIDE TAB 1 GM 1 G: 1 TAB at 08:47

## 2019-01-30 RX ADMIN — METOCLOPRAMIDE HYDROCHLORIDE 10 MG: 10 TABLET ORAL at 17:52

## 2019-01-30 RX ADMIN — Medication 400 MG: at 06:09

## 2019-01-30 RX ADMIN — MULTIPLE VITAMINS W/ MINERALS TAB 1 TABLET: TAB at 06:09

## 2019-01-30 RX ADMIN — PYRIDOSTIGMINE BROMIDE 90 MG: 60 TABLET ORAL at 06:08

## 2019-01-30 RX ADMIN — METOCLOPRAMIDE HYDROCHLORIDE 10 MG: 10 TABLET ORAL at 08:47

## 2019-01-30 RX ADMIN — FLUDROCORTISONE ACETATE 0.3 MG: 0.1 TABLET ORAL at 06:08

## 2019-01-30 RX ADMIN — SODIUM CHLORIDE TAB 1 GM 1 G: 1 TAB at 17:50

## 2019-01-30 RX ADMIN — PYRIDOSTIGMINE BROMIDE 90 MG: 60 TABLET ORAL at 12:42

## 2019-01-30 RX ADMIN — METOCLOPRAMIDE HYDROCHLORIDE 10 MG: 10 TABLET ORAL at 12:42

## 2019-01-30 RX ADMIN — PYRIDOSTIGMINE BROMIDE 90 MG: 60 TABLET ORAL at 17:51

## 2019-01-30 RX ADMIN — MIDODRINE HYDROCHLORIDE 10 MG: 5 TABLET ORAL at 12:42

## 2019-01-30 RX ADMIN — MIDODRINE HYDROCHLORIDE 10 MG: 5 TABLET ORAL at 08:47

## 2019-01-30 RX ADMIN — FLUOXETINE HYDROCHLORIDE 60 MG: 20 CAPSULE ORAL at 06:09

## 2019-01-30 RX ADMIN — OXYCODONE HYDROCHLORIDE 5 MG: 5 TABLET ORAL at 09:29

## 2019-01-30 ASSESSMENT — ENCOUNTER SYMPTOMS
NEUROLOGICAL NEGATIVE: 1
RESPIRATORY NEGATIVE: 1
HEARTBURN: 0
PSYCHIATRIC NEGATIVE: 1
EYES NEGATIVE: 1
VOMITING: 0
SENSORY CHANGE: 0
INSOMNIA: 0
WEIGHT LOSS: 0
SPUTUM PRODUCTION: 0
DEPRESSION: 0
NECK PAIN: 0
BRUISES/BLEEDS EASILY: 0
TREMORS: 0
CLAUDICATION: 0
DIARRHEA: 0
CARDIOVASCULAR NEGATIVE: 1
BACK PAIN: 1
CONSTITUTIONAL NEGATIVE: 1
MYALGIAS: 0
SHORTNESS OF BREATH: 0
GASTROINTESTINAL NEGATIVE: 1
ORTHOPNEA: 0
EYE DISCHARGE: 0
DOUBLE VISION: 0
EYE PAIN: 0
ABDOMINAL PAIN: 0
TINGLING: 0
NAUSEA: 0

## 2019-01-30 ASSESSMENT — LIFESTYLE VARIABLES: SUBSTANCE_ABUSE: 0

## 2019-01-30 NOTE — CARE PLAN
Problem: Discharge Barriers/Planning  Goal: Patient's continuum of care needs will be met    Intervention: Collaborate with Transitional Care Team and Interdisciplinary Team to meet discharge needs  [facility] referral placed.       Problem: Mobility  Goal: Risk for activity intolerance will decrease    Intervention: Encourage patient to increase activity level in collaboration with Interdisciplinary Team  Patient encouraged to increase mobility, agreeable to ambulating halls twice this shift. Tolerated well.

## 2019-01-30 NOTE — PROGRESS NOTES
Hospital Medicine Daily Progress Note    Date of Service  1/30/2019    Chief Complaint  53 y.o. male admitted 12/28/2018 with suicide ideation    Hospital Course    Patient has been admitted now since the last month and he has had suicidal ideation.  Patient at this point unfortunately continues to be suicidal.  Medications at this point are being actively adjusted.  The patient at this point will need a discharge plan medically at this point he is optimized and stable he does have chronic dysautonomia secondary to uncontrolled diabetes and because of this he is hypotensive.  The patient will need a wheelchair and medication management long-term to keep his blood pressure stable.      Interval Problem Update  Patient states he feels better after the Abilify was discontinued.  He has less suicidal thoughts.  Medication optimization at this point has been obtained.  He continues at this point to be debilitated.  Medically the patient at this point is cleared for discharge and at this point he will need to be discharged to an inpatient psychiatric facility.    Consultants/Specialty  Psychiatry    Code Status  Full code    Disposition  To be determined    Review of Systems  Review of Systems   Constitutional: Negative.  Negative for malaise/fatigue and weight loss.   HENT: Negative.  Negative for ear discharge and nosebleeds.    Eyes: Negative.  Negative for double vision, pain and discharge.   Respiratory: Negative.  Negative for sputum production and shortness of breath.    Cardiovascular: Negative.  Negative for orthopnea and claudication.   Gastrointestinal: Negative.  Negative for abdominal pain, diarrhea, heartburn, nausea and vomiting.   Genitourinary: Negative.  Negative for frequency, hematuria and urgency.   Musculoskeletal: Positive for back pain. Negative for myalgias and neck pain.   Skin: Negative.  Negative for itching and rash.   Neurological: Negative.  Negative for tingling, tremors and sensory change.    Endo/Heme/Allergies: Negative.  Does not bruise/bleed easily.   Psychiatric/Behavioral: Negative.  Negative for depression, substance abuse and suicidal ideas. The patient does not have insomnia.    All other systems reviewed and are negative.       Physical Exam  Temp:  [36.1 °C (96.9 °F)-36.5 °C (97.7 °F)] 36.4 °C (97.5 °F)  Pulse:  [56-92] 92  Resp:  [16-18] 16  BP: ()/(58-83) 95/69  SpO2:  [93 %-97 %] 96 %    Physical Exam   Constitutional: He is oriented to person, place, and time. He appears well-developed and well-nourished. No distress.   HENT:   Head: Normocephalic and atraumatic.   Right Ear: External ear normal.   Left Ear: External ear normal.   Eyes: Pupils are equal, round, and reactive to light. Conjunctivae and EOM are normal. Right eye exhibits no discharge. Left eye exhibits no discharge.   Neck: Normal range of motion. Neck supple. No tracheal deviation present.   Cardiovascular: Normal rate and regular rhythm.    Pulmonary/Chest: Effort normal and breath sounds normal. No stridor. No respiratory distress. He has no wheezes.   Abdominal: Soft. Bowel sounds are normal. He exhibits no distension. There is no tenderness.   Musculoskeletal: Normal range of motion. He exhibits no edema or tenderness.   Neurological: He is alert and oriented to person, place, and time. He has normal reflexes. No cranial nerve deficit.   Skin: Skin is warm and dry. No rash noted. He is not diaphoretic. No erythema.   Psychiatric: He has a normal mood and affect. His speech is normal and behavior is normal. Judgment normal. Cognition and memory are normal. He does not exhibit a depressed mood. He expresses no suicidal ideation.   Nursing note and vitals reviewed.      Fluids    Intake/Output Summary (Last 24 hours) at 01/30/19 1532  Last data filed at 01/30/19 1352   Gross per 24 hour   Intake             1100 ml   Output             2125 ml   Net            -1025 ml       Laboratory                         Imaging  DX-HIP-UNILATERAL-WITH PELVIS-1 VIEW RIGHT   Final Result      No radiographic evidence of acute traumatic injury right hip.      DX-CHEST-PORTABLE (1 VIEW)   Final Result      Hypoinflation without other evidence for acute cardiopulmonary disease.      MR-BRAIN-W/O   Final Result      1.  MRI of the brain without contrast within normal limits for age with mild white matter changes.   2.  Chronic appearing sinus disease   3.  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 This does not have an aggressive appearance.      US-EXTREMITY VENOUS LOWER BILAT   Final Result      CT-HAND WITH RIGHT   Final Result      1.  Edema in the dorsum of the forearm and hand without a discrete fluid collection to suggest abscess is identified.      US-CAROTID DOPPLER BILAT   Final Result      DX-LUMBAR SPINE-2 OR 3 VIEWS   Final Result         Stable compression deformity in the L1 vertebral body.      CT-CTA CHEST PULMONARY ARTERY W/ RECONS   Final Result         1. No CT evidence of pulmonary embolism.      2. Old right rib fractures.      3. Cholelithiasis. Enlarged spleen with granulomas.      4. Mild superior endplate compression deformity of L1 could be acute. Correlate with point tenderness. No retropulsion. No malalignment.      CT-CSPINE WITHOUT PLUS RECONS   Final Result         1. No acute fracture from C1 through T3 is visualized.         CT-HEAD W/O   Final Result         1. No acute intracranial abnormality. No evidence of acute intracranial hemorrhage or mass lesion.               DX-THORACIC SPINE-WITH SWIMMERS VIEW   Final Result      No acute thoracic spine fracture or subluxation identified.      DX-CHEST-PORTABLE (1 VIEW)   Final Result      No acute cardiac or pulmonary abnormality is noted.           Assessment/Plan  * Orthostatic hypotension- (present on admission)   Assessment & Plan    Secondary to dysautonomia secondary to diabetes.  Continue at this  point to support his blood pressure.     Suicidal ideation   Assessment & Plan    He states his suicidal ideation has improved     Fall from ground level   Assessment & Plan    Patient had a ground-level fall previously was suffering back injury at this point continue with fall precautions     Lumbar compression fracture (HCC)   Assessment & Plan    Continue with TLSO brace when ambulating.  Continue with pain management as needed     Psychogenic nonepileptic seizure- (present on admission)   Assessment & Plan    EEG reveals pseudoseizures continue at this point with seizure prophylaxis     Vitamin B12 deficiency- (present on admission)   Assessment & Plan    Patient with B12 deficiency is continued on monthly B12 injections intramuscularly.     Syncope- (present on admission)   Assessment & Plan    Patient had a negative workup for syncope.  The patient has dysautonomia secondary to diabetes and this is most likely causing hypotension and periodic syncopal episodes.  Patient remains on blood pressure support.  Continue at this point with Midodrin.     Debility- (present on admission)   Assessment & Plan    Continue with physical therapy and occupational therapy as tolerated     Acute respiratory failure with hypoxia (HCC)   Assessment & Plan    Resolved     Chronic diastolic heart failure (HCC)- (present on admission)   Assessment & Plan    Medically optimized does not show any signs of acute heart failure.     Bilateral leg edema- (present on admission)   Assessment & Plan    Monitor leg edema currently recommend elevating legs and sequentials     Venous stasis dermatitis of both lower extremities- (present on admission)   Assessment & Plan    Patient has bilateral compression stockings     Paroxysmal A-fib (HCC)- (present on admission)   Assessment & Plan    Rate control with amiodarone.  To high risk for anticoagulation     Essential hypertension- (present on admission)   Assessment & Plan    Continue with blood  pressure management optimization     Mixed hyperlipidemia- (present on admission)   Assessment & Plan    Low-fat low-cholesterol diet and statin.  Lab Results   Component Value Date/Time    CHOLSTRLTOT 104 11/22/2018 12:07 AM    LDL 60 11/22/2018 12:07 AM    HDL 27 (A) 11/22/2018 12:07 AM    TRIGLYCERIDE 87 11/22/2018 12:07 AM                    VTE prophylaxis: Heparin

## 2019-01-30 NOTE — CARE PLAN
Problem: Safety  Goal: Will remain free from injury  Outcome: PROGRESSING AS EXPECTED  Pt under 1:1 observation due to legal hold - SI.     Problem: Discharge Barriers/Planning  Goal: Patient's continuum of care needs will be met  Outcome: PROGRESSING SLOWER THAN EXPECTED  Discharge still pending acceptance to facility.

## 2019-01-30 NOTE — DISCHARGE PLANNING
Agency/Facility Name: [facility]   Spoke To: Fax notification  Outcome: Patient declined, non contracted insurance provider.

## 2019-01-30 NOTE — PROGRESS NOTES
Received handoff report from ELISA Villafuerte and assumed pt care at 1900.  Pt resting in bed comfortably. Labs reviewed. Patient and RN discussed plan of care and questions were answered. Bed in lowest and locked position and bed alarm in place. Call light and personal belongings within reach.

## 2019-01-30 NOTE — DISCHARGE PLANNING
Agency/Facility Name: [facility], [facility], [facility], [facility].   Outcome: Referral along with legal hold medical clearance sent via fax @ [phone]

## 2019-01-30 NOTE — PROGRESS NOTES
Assumed care of pt at 0700. Received report from RN. Pt A&Ox4. Assessment complete. Labs reviewed. Pt and RN discussed plan of care. Pt questions answered. Pt needs are met at this time. Bed in lowest and locked position. Call light within reach. Upper bed rails up. Hourly rounding in place.

## 2019-01-30 NOTE — CARE PLAN
Problem: Communication  Goal: The ability to communicate needs accurately and effectively will improve  Outcome: PROGRESSING AS EXPECTED  Pt encouraged to voice needs and concerns, pt questions answered, emotional support provided.     Problem: Safety  Goal: Will remain free from falls  Outcome: PROGRESSING AS EXPECTED  Pt has fallen on this admit, high fall risk precations in place, patient calls appropriately, bed alarm in use, bed in lowest and locked position.

## 2019-01-31 PROCEDURE — 700102 HCHG RX REV CODE 250 W/ 637 OVERRIDE(OP): Performed by: INTERNAL MEDICINE

## 2019-01-31 PROCEDURE — A9270 NON-COVERED ITEM OR SERVICE: HCPCS | Performed by: HOSPITALIST

## 2019-01-31 PROCEDURE — 700102 HCHG RX REV CODE 250 W/ 637 OVERRIDE(OP): Performed by: HOSPITALIST

## 2019-01-31 PROCEDURE — A9270 NON-COVERED ITEM OR SERVICE: HCPCS | Performed by: PSYCHIATRY & NEUROLOGY

## 2019-01-31 PROCEDURE — G0378 HOSPITAL OBSERVATION PER HR: HCPCS

## 2019-01-31 PROCEDURE — A9270 NON-COVERED ITEM OR SERVICE: HCPCS | Performed by: INTERNAL MEDICINE

## 2019-01-31 PROCEDURE — 99225 PR SUBSEQUENT OBSERVATION CARE,LEVEL II: CPT | Performed by: HOSPITALIST

## 2019-01-31 PROCEDURE — 700102 HCHG RX REV CODE 250 W/ 637 OVERRIDE(OP): Performed by: PSYCHIATRY & NEUROLOGY

## 2019-01-31 RX ADMIN — METOCLOPRAMIDE HYDROCHLORIDE 10 MG: 10 TABLET ORAL at 05:59

## 2019-01-31 RX ADMIN — AMIODARONE HYDROCHLORIDE 50 MG: 200 TABLET ORAL at 05:58

## 2019-01-31 RX ADMIN — METOCLOPRAMIDE HYDROCHLORIDE 10 MG: 10 TABLET ORAL at 12:34

## 2019-01-31 RX ADMIN — MIDODRINE HYDROCHLORIDE 10 MG: 5 TABLET ORAL at 08:32

## 2019-01-31 RX ADMIN — METOCLOPRAMIDE HYDROCHLORIDE 10 MG: 10 TABLET ORAL at 18:07

## 2019-01-31 RX ADMIN — Medication 400 MG: at 05:59

## 2019-01-31 RX ADMIN — OXYCODONE HYDROCHLORIDE 5 MG: 5 TABLET ORAL at 12:41

## 2019-01-31 RX ADMIN — OXYCODONE HYDROCHLORIDE AND ACETAMINOPHEN 500 MG: 500 TABLET ORAL at 05:59

## 2019-01-31 RX ADMIN — MIDODRINE HYDROCHLORIDE 10 MG: 5 TABLET ORAL at 18:08

## 2019-01-31 RX ADMIN — PYRIDOSTIGMINE BROMIDE 90 MG: 60 TABLET ORAL at 18:08

## 2019-01-31 RX ADMIN — SODIUM CHLORIDE TAB 1 GM 1 G: 1 TAB at 18:08

## 2019-01-31 RX ADMIN — FLUDROCORTISONE ACETATE 0.3 MG: 0.1 TABLET ORAL at 05:57

## 2019-01-31 RX ADMIN — MIDODRINE HYDROCHLORIDE 10 MG: 5 TABLET ORAL at 12:34

## 2019-01-31 RX ADMIN — PYRIDOSTIGMINE BROMIDE 90 MG: 60 TABLET ORAL at 05:57

## 2019-01-31 RX ADMIN — OXYCODONE HYDROCHLORIDE 5 MG: 5 TABLET ORAL at 05:57

## 2019-01-31 RX ADMIN — SODIUM CHLORIDE TAB 1 GM 1 G: 1 TAB at 05:57

## 2019-01-31 RX ADMIN — FLUOXETINE HYDROCHLORIDE 60 MG: 20 CAPSULE ORAL at 05:59

## 2019-01-31 RX ADMIN — MULTIPLE VITAMINS W/ MINERALS TAB 1 TABLET: TAB at 05:59

## 2019-01-31 RX ADMIN — PYRIDOSTIGMINE BROMIDE 90 MG: 60 TABLET ORAL at 12:34

## 2019-01-31 RX ADMIN — SODIUM CHLORIDE TAB 1 GM 1 G: 1 TAB at 12:34

## 2019-01-31 RX ADMIN — ASPIRIN 81 MG 81 MG: 81 TABLET ORAL at 05:59

## 2019-01-31 ASSESSMENT — ENCOUNTER SYMPTOMS
BACK PAIN: 1
WHEEZING: 0
FOCAL WEAKNESS: 0
CARDIOVASCULAR NEGATIVE: 1
VOMITING: 0
SPEECH CHANGE: 0
FEVER: 0
PALPITATIONS: 0
DEPRESSION: 0
COUGH: 0
WEIGHT LOSS: 0
NEUROLOGICAL NEGATIVE: 1
EYES NEGATIVE: 1
EYE REDNESS: 0
BLOOD IN STOOL: 0
NAUSEA: 0
HEARTBURN: 0
CHILLS: 0
CONSTITUTIONAL NEGATIVE: 1
BRUISES/BLEEDS EASILY: 0
DIZZINESS: 0
HEMOPTYSIS: 0
HEADACHES: 0
HALLUCINATIONS: 0
NERVOUS/ANXIOUS: 0
INSOMNIA: 1
PHOTOPHOBIA: 0
RESPIRATORY NEGATIVE: 1
GASTROINTESTINAL NEGATIVE: 1
NECK PAIN: 0
DOUBLE VISION: 0
MYALGIAS: 0

## 2019-01-31 NOTE — CARE PLAN
Problem: Safety  Goal: Will remain free from injury  Outcome: PROGRESSING AS EXPECTED  Pt is on a legal hold for SI. 1:1 sitter at bed side.     Problem: Discharge Barriers/Planning  Goal: Patient's continuum of care needs will be met  Outcome: PROGRESSING SLOWER THAN EXPECTED  Referral sent to [facility], [facility], and [facility].

## 2019-01-31 NOTE — PROGRESS NOTES
Received handoff report from ELISA Alcantar and assumed pt care at 1900.  Pt resting in bed comfortably. Patient and RN discussed plan of care and questions were answered. Bed in lowest and locked position and bed alarm in place. Call light and personal belongings within reach.

## 2019-01-31 NOTE — PROGRESS NOTES
Hospital Medicine Daily Progress Note    Date of Service  1/31/2019    Chief Complaint  53 y.o. male admitted 12/28/2018 with suicide ideation    Hospital Course    Patient has been admitted now since the last month and he has had suicidal ideation.  Patient at this point unfortunately continues to be suicidal.  Medications at this point are being actively adjusted.  The patient at this point will need a discharge plan medically at this point he is optimized and stable he does have chronic dysautonomia secondary to uncontrolled diabetes and because of this he is hypotensive.  The patient will need a wheelchair and medication management long-term to keep his blood pressure stable.      Interval Problem Update  Patient has been medically cleared and optimized.  At this point he is ready for discharge to a inpatient psychiatric facility for his depression and suicidal thoughts.  At this point we are pending an accepting facility.  Today the patient has no new complaints.  His blood pressure management is being optimized with Midodrin and he will most likely need a chronic wheelchair to help him with his dysautonomia from diabetes.    Consultants/Specialty  Psychiatry    Code Status  Full code    Disposition  To be determined    Review of Systems  Review of Systems   Constitutional: Negative.  Negative for chills, fever, malaise/fatigue and weight loss.   HENT: Negative.  Negative for congestion and ear pain.    Eyes: Negative.  Negative for double vision, photophobia and redness.   Respiratory: Negative.  Negative for cough, hemoptysis and wheezing.    Cardiovascular: Negative.  Negative for palpitations and leg swelling.   Gastrointestinal: Negative.  Negative for blood in stool, heartburn, nausea and vomiting.   Genitourinary: Negative.  Negative for frequency, hematuria and urgency.   Musculoskeletal: Positive for back pain. Negative for myalgias and neck pain.   Skin: Negative.  Negative for itching and rash.    Neurological: Negative.  Negative for dizziness, speech change, focal weakness and headaches.   Endo/Heme/Allergies: Negative.  Does not bruise/bleed easily.   Psychiatric/Behavioral: Negative for depression and hallucinations. The patient has insomnia. The patient is not nervous/anxious.         Nightmares   All other systems reviewed and are negative.       Physical Exam  Temp:  [36.4 °C (97.5 °F)-37 °C (98.6 °F)] 36.5 °C (97.7 °F)  Pulse:  [55-73] 61  Resp:  [17-19] 17  BP: (112-148)/(62-83) 124/64  SpO2:  [91 %-96 %] 91 %    Physical Exam   Constitutional: He is oriented to person, place, and time. He appears well-developed and well-nourished.   HENT:   Head: Normocephalic and atraumatic.   Right Ear: External ear normal.   Left Ear: External ear normal.   Nose: Nose normal.   Mouth/Throat: Oropharynx is clear and moist.   Eyes: Pupils are equal, round, and reactive to light. Conjunctivae and EOM are normal.   Neck: Normal range of motion. Neck supple. No thyromegaly present.   Cardiovascular: Normal rate and regular rhythm.    Pulmonary/Chest: Effort normal and breath sounds normal.   Abdominal: Soft. Bowel sounds are normal. There is no rebound and no guarding.   Musculoskeletal: Normal range of motion. He exhibits no edema or deformity.   Lymphadenopathy:     He has no cervical adenopathy.   Neurological: He is alert and oriented to person, place, and time. He has normal reflexes. No cranial nerve deficit. Coordination normal.   Skin: Skin is warm and dry. No erythema.   Psychiatric: He has a normal mood and affect. His speech is normal and behavior is normal. Judgment and thought content normal. Cognition and memory are normal. He does not exhibit a depressed mood. He expresses no suicidal ideation.   Nursing note and vitals reviewed.      Fluids    Intake/Output Summary (Last 24 hours) at 01/31/19 9527  Last data filed at 01/31/19 0930   Gross per 24 hour   Intake              400 ml   Output               650 ml   Net             -250 ml       Laboratory                        Imaging  DX-HIP-UNILATERAL-WITH PELVIS-1 VIEW RIGHT   Final Result      No radiographic evidence of acute traumatic injury right hip.      DX-CHEST-PORTABLE (1 VIEW)   Final Result      Hypoinflation without other evidence for acute cardiopulmonary disease.      MR-BRAIN-W/O   Final Result      1.  MRI of the brain without contrast within normal limits for age with mild white matter changes.   2.  Chronic appearing sinus disease   3.  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 This does not have an aggressive appearance.      US-EXTREMITY VENOUS LOWER BILAT   Final Result      CT-HAND WITH RIGHT   Final Result      1.  Edema in the dorsum of the forearm and hand without a discrete fluid collection to suggest abscess is identified.      US-CAROTID DOPPLER BILAT   Final Result      DX-LUMBAR SPINE-2 OR 3 VIEWS   Final Result         Stable compression deformity in the L1 vertebral body.      CT-CTA CHEST PULMONARY ARTERY W/ RECONS   Final Result         1. No CT evidence of pulmonary embolism.      2. Old right rib fractures.      3. Cholelithiasis. Enlarged spleen with granulomas.      4. Mild superior endplate compression deformity of L1 could be acute. Correlate with point tenderness. No retropulsion. No malalignment.      CT-CSPINE WITHOUT PLUS RECONS   Final Result         1. No acute fracture from C1 through T3 is visualized.         CT-HEAD W/O   Final Result         1. No acute intracranial abnormality. No evidence of acute intracranial hemorrhage or mass lesion.               DX-THORACIC SPINE-WITH SWIMMERS VIEW   Final Result      No acute thoracic spine fracture or subluxation identified.      DX-CHEST-PORTABLE (1 VIEW)   Final Result      No acute cardiac or pulmonary abnormality is noted.           Assessment/Plan  * Orthostatic hypotension- (present on admission)   Assessment & Plan     Continue with Midodrin     Suicidal ideation   Assessment & Plan    At this point medical optimization for his suicidal thoughts is underway and at this point he is improving considerably.  Continue with psychiatric monitoring.  Per psychiatry patient would benefit from continued inpatient psychiatric management.     Fall from ground level   Assessment & Plan    Currently falls are controlled and pain management is optimized     Lumbar compression fracture (HCC)   Assessment & Plan    TLSO brace and pain management to continue     Psychogenic nonepileptic seizure- (present on admission)   Assessment & Plan    Pseudoseizures at this point are controlled     Vitamin B12 deficiency- (present on admission)   Assessment & Plan    Continue with monthly intramuscular vitamin B12 injections     Syncope- (present on admission)   Assessment & Plan    Patient is found to have dysautonomia secondary to uncontrolled diabetes.  The patient at this point is negative for syncopal workup.  Continue at this point with Midodrin as well as therapies to optimize his mobility.       Debility- (present on admission)   Assessment & Plan    Continued with therapies     Acute respiratory failure with hypoxia ([facility])   Assessment & Plan    Resolved     Chronic diastolic heart failure ([facility])- (present on admission)   Assessment & Plan    Diastolic heart failure is most likely the cause of his leg swelling continue at this point with medical optimization to keep heart failure under control currently is not with an exacerbation.     Bilateral leg edema- (present on admission)   Assessment & Plan    Monitor at this point leg swelling.     Venous stasis dermatitis of both lower extremities- (present on admission)   Assessment & Plan    Continue with bilateral lower extremity sequentials     Paroxysmal A-fib ([facility])- (present on admission)   Assessment & Plan    Continue for rate control with amiodarone do not use anticoagulation with his history of  falls     Essential hypertension- (present on admission)   Assessment & Plan    Continue with blood pressure management optimization     Mixed hyperlipidemia- (present on admission)   Assessment & Plan    Low-fat low-cholesterol diet and statin.  Lab Results   Component Value Date/Time    CHOLSTRLTOT 104 11/22/2018 12:07 AM    LDL 60 11/22/2018 12:07 AM    HDL 27 (A) 11/22/2018 12:07 AM    TRIGLYCERIDE 87 11/22/2018 12:07 AM                    VTE prophylaxis: Heparin

## 2019-01-31 NOTE — PROGRESS NOTES
Assumed patient care at 0700. Received report from night shift. Assessment completed. A&Ox 4. C/o of 6/10 back pain, declines interbention. Patient ambulating hallways, verbalizes feeling dizzy. High fall risk, precautions in place; alarm on, treaded socks in place, personal possessions and call light placed within reach, equipment away from bedside. Up to chair for meals. POC discussed with pt, communication board updated. On legal hold for SI, continues to verbalize wanting to end life. 1:1 sitter at bedside. Patient denies any additional needs at this time. Significant other at bedside. Per psychiatry order: pt allowed to have visitors and phone at bedside.

## 2019-01-31 NOTE — PSYCHIATRY
"PSYCHIATRIC FOLLOW UP:       Reason for consult: functional seizures   Requesting Physician: Slime     HPI:       Sebastián Castro is a 53 y.o. year old male with a PMH of CHF, a fib, DM, HTN,  a fib s/p cardioversion, orthostatic hypotentison with depressed mood, PTSD, and some functional seizures. He was also noted to have an L1 compression fracture and was placed in a TLSO.     Doesn't like the abilify. Feels he is having more flashbacks. He is also having nightmares. He is tearful. Still suicidal. He got a different chair; it fits him better but it still \"uncomfortable\" (isn't a custom chair) and he doesn't like that it doesn't have a cushion. He expressed frustration and hopelessness, feeling like he will never overcome his physical and financial problems or be able to live independently.         Psychiatric Examination: observed phenomenon:  Vitals: /69   Pulse (!) 57   Temp 36.7 °C (98.1 °F) (Temporal)   Resp 19   Ht 1.829 m (6')   Wt 108.5 kg (239 lb 3.2 oz)   SpO2 95%   BMI 32.44 kg/m²  Body mass index is 32.44 kg/m².  Musculoskeletal psychomotor retardation   Appearance: grooming fair   Thoughts Process:Logical and sequential, goal-directed   Thought Content:  No a/vh, no evidence of delusions, no ideas of reference, no internal stimulation noted   Speech: Nl tone, rate, and volume. Not pressured. Understandable.   Mood:    depressed  Affect:    constricted  SI/HI:   +si with plan   Attention/Alertness:   Awake, alert.   Memory:    Grossly intact.   Orientation:    Grossly intact.   Cognition:Grossly intact.   Insight:fair   Judgement:  Fair   Neurological Testing:    Medical systems reviewed:   Eyes:no blurry vision   Skin: no rash   CV: no palpitations no cp   Lungs:no sob   Neuro: no numbness tingling, no ha.   GI:no n/v   :no dysuria   Endocrine:  Psych:see hpi   Musculoskeletal: some pain   All other systems reviewed and are negative.       Soc history:    Appears he has been " denied at [facility] due to being a fall risk   Cannot live at homeless shelter with his condition.   PT has stated he will need home health, wheelchair, and larry     Lab results/tests:   No results found for this or any previous visit (from the past 48 hour(s)).  DX-HIP-UNILATERAL-WITH PELVIS-1 VIEW RIGHT   Final Result      No radiographic evidence of acute traumatic injury right hip.      DX-CHEST-PORTABLE (1 VIEW)   Final Result      Hypoinflation without other evidence for acute cardiopulmonary disease.      MR-BRAIN-W/O   Final Result      1.  MRI of the brain without contrast within normal limits for age with mild white matter changes.   2.  Chronic appearing sinus disease   3.  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 This does not have an aggressive appearance.      US-EXTREMITY VENOUS LOWER BILAT   Final Result      CT-HAND WITH RIGHT   Final Result      1.  Edema in the dorsum of the forearm and hand without a discrete fluid collection to suggest abscess is identified.      US-CAROTID DOPPLER BILAT   Final Result      DX-LUMBAR SPINE-2 OR 3 VIEWS   Final Result         Stable compression deformity in the L1 vertebral body.      CT-CTA CHEST PULMONARY ARTERY W/ RECONS   Final Result         1. No CT evidence of pulmonary embolism.      2. Old right rib fractures.      3. Cholelithiasis. Enlarged spleen with granulomas.      4. Mild superior endplate compression deformity of L1 could be acute. Correlate with point tenderness. No retropulsion. No malalignment.      CT-CSPINE WITHOUT PLUS RECONS   Final Result         1. No acute fracture from C1 through T3 is visualized.         CT-HEAD W/O   Final Result         1. No acute intracranial abnormality. No evidence of acute intracranial hemorrhage or mass lesion.               DX-THORACIC SPINE-WITH SWIMMERS VIEW   Final Result      No acute thoracic spine fracture or subluxation identified.      DX-CHEST-PORTABLE  (1 VIEW)   Final Result      No acute cardiac or pulmonary abnormality is noted.               Assessment:            MDD, severe, without psychotic features     Plan:  legal hold: extending   D/c abilify  Increasing prozac.   D/c simvastatin after discussion with hospitalist, as it may be contributing to nightmares as well.   Will follow.

## 2019-01-31 NOTE — DISCHARGE PLANNING
Agency/Facility Name: [facility]  Outcome: Voicemail left for admissions/intake regarding the status of patient's referral.

## 2019-01-31 NOTE — DISCHARGE PLANNING
Agency/Facility Name: [facility]   Spoke To: Julia  Outcome: Patient declined due to being to medically complex.    Agency/Facility Name: [facility]   Spoke To: Admissions  Outcome: Patient's referral currently being reviewed by doctor.     JAMES Oropeza notified.

## 2019-02-01 PROCEDURE — 700102 HCHG RX REV CODE 250 W/ 637 OVERRIDE(OP): Performed by: HOSPITALIST

## 2019-02-01 PROCEDURE — 99225 PR SUBSEQUENT OBSERVATION CARE,LEVEL II: CPT | Performed by: HOSPITALIST

## 2019-02-01 PROCEDURE — A9270 NON-COVERED ITEM OR SERVICE: HCPCS | Performed by: HOSPITALIST

## 2019-02-01 PROCEDURE — 700102 HCHG RX REV CODE 250 W/ 637 OVERRIDE(OP): Performed by: INTERNAL MEDICINE

## 2019-02-01 PROCEDURE — A9270 NON-COVERED ITEM OR SERVICE: HCPCS | Performed by: INTERNAL MEDICINE

## 2019-02-01 PROCEDURE — 700102 HCHG RX REV CODE 250 W/ 637 OVERRIDE(OP): Performed by: PSYCHIATRY & NEUROLOGY

## 2019-02-01 PROCEDURE — G0378 HOSPITAL OBSERVATION PER HR: HCPCS

## 2019-02-01 PROCEDURE — A9270 NON-COVERED ITEM OR SERVICE: HCPCS | Performed by: PSYCHIATRY & NEUROLOGY

## 2019-02-01 PROCEDURE — 99213 OFFICE O/P EST LOW 20 MIN: CPT | Performed by: PSYCHIATRY & NEUROLOGY

## 2019-02-01 RX ADMIN — FLUOXETINE HYDROCHLORIDE 60 MG: 20 CAPSULE ORAL at 05:36

## 2019-02-01 RX ADMIN — MIDODRINE HYDROCHLORIDE 10 MG: 5 TABLET ORAL at 12:14

## 2019-02-01 RX ADMIN — PYRIDOSTIGMINE BROMIDE 90 MG: 60 TABLET ORAL at 14:15

## 2019-02-01 RX ADMIN — SODIUM CHLORIDE TAB 1 GM 1 G: 1 TAB at 17:15

## 2019-02-01 RX ADMIN — Medication 400 MG: at 05:36

## 2019-02-01 RX ADMIN — MULTIPLE VITAMINS W/ MINERALS TAB 1 TABLET: TAB at 05:37

## 2019-02-01 RX ADMIN — OXYCODONE HYDROCHLORIDE 5 MG: 5 TABLET ORAL at 05:37

## 2019-02-01 RX ADMIN — SODIUM CHLORIDE TAB 1 GM 1 G: 1 TAB at 10:35

## 2019-02-01 RX ADMIN — AMIODARONE HYDROCHLORIDE 50 MG: 200 TABLET ORAL at 05:36

## 2019-02-01 RX ADMIN — MIDODRINE HYDROCHLORIDE 10 MG: 5 TABLET ORAL at 17:15

## 2019-02-01 RX ADMIN — FLUDROCORTISONE ACETATE 0.3 MG: 0.1 TABLET ORAL at 05:39

## 2019-02-01 RX ADMIN — OXYCODONE HYDROCHLORIDE AND ACETAMINOPHEN 500 MG: 500 TABLET ORAL at 05:36

## 2019-02-01 RX ADMIN — METOCLOPRAMIDE HYDROCHLORIDE 10 MG: 10 TABLET ORAL at 05:35

## 2019-02-01 RX ADMIN — PYRIDOSTIGMINE BROMIDE 90 MG: 60 TABLET ORAL at 05:39

## 2019-02-01 RX ADMIN — ASPIRIN 81 MG 81 MG: 81 TABLET ORAL at 05:36

## 2019-02-01 RX ADMIN — METOCLOPRAMIDE HYDROCHLORIDE 10 MG: 10 TABLET ORAL at 17:15

## 2019-02-01 RX ADMIN — PYRIDOSTIGMINE BROMIDE 90 MG: 60 TABLET ORAL at 17:15

## 2019-02-01 RX ADMIN — SODIUM CHLORIDE TAB 1 GM 1 G: 1 TAB at 08:28

## 2019-02-01 RX ADMIN — METOCLOPRAMIDE HYDROCHLORIDE 10 MG: 10 TABLET ORAL at 10:35

## 2019-02-01 RX ADMIN — OXYCODONE HYDROCHLORIDE 5 MG: 5 TABLET ORAL at 10:36

## 2019-02-01 RX ADMIN — OXYCODONE HYDROCHLORIDE 5 MG: 5 TABLET ORAL at 17:15

## 2019-02-01 RX ADMIN — MIDODRINE HYDROCHLORIDE 10 MG: 5 TABLET ORAL at 08:28

## 2019-02-01 ASSESSMENT — ENCOUNTER SYMPTOMS
DIZZINESS: 0
TINGLING: 0
GASTROINTESTINAL NEGATIVE: 1
SHORTNESS OF BREATH: 0
DEPRESSION: 0
NECK PAIN: 0
CARDIOVASCULAR NEGATIVE: 1
BRUISES/BLEEDS EASILY: 0
SENSORY CHANGE: 0
NAUSEA: 0
DIARRHEA: 0
EYE PAIN: 0
EYES NEGATIVE: 1
HEADACHES: 0
NEUROLOGICAL NEGATIVE: 1
SEIZURES: 0
EYE DISCHARGE: 0
CONSTITUTIONAL NEGATIVE: 1
CHILLS: 0
BACK PAIN: 1
DIAPHORESIS: 0
CONSTIPATION: 0
SPUTUM PRODUCTION: 0
MYALGIAS: 0
RESPIRATORY NEGATIVE: 1
INSOMNIA: 0
PALPITATIONS: 0
FEVER: 0
HEARTBURN: 0

## 2019-02-01 NOTE — DISCHARGE PLANNING
Agency/Facility Name: [facility]  Spoke To: Julia  Outcome: Patient declined. Facility is not contracted with patient's insurance.

## 2019-02-01 NOTE — CARE PLAN
Problem: Pain Management  Goal: Pain level will decrease to patient's comfort goal  Outcome: PROGRESSING AS EXPECTED  Pt feels pain is well controlled on current pain medication regimen.     Problem: Safety  Goal: Will remain free from injury  Outcome: PROGRESSING AS EXPECTED  1:1 sitter at bedside. Pt on legal hold for SI.

## 2019-02-01 NOTE — CARE PLAN
Problem: Safety  Goal: Will remain free from falls  Outcome: PROGRESSING AS EXPECTED  All fall precautions in place, 1:1 sitter at bedside    Problem: Bowel/Gastric:  Goal: Will not experience complications related to bowel motility  Outcome: PROGRESSING AS EXPECTED  BM overnight, normal per pt

## 2019-02-01 NOTE — PROGRESS NOTES
Hospital Medicine Daily Progress Note    Date of Service  2/1/2019    Chief Complaint  53 y.o. male admitted 12/28/2018 with suicide ideation    Hospital Course    Patient has been admitted now since the last month and he has had suicidal ideation.  Patient at this point unfortunately continues to be suicidal.  Medications at this point are being actively adjusted.  The patient at this point will need a discharge plan medically at this point he is optimized and stable he does have chronic dysautonomia secondary to uncontrolled diabetes and because of this he is hypotensive.  The patient will need a wheelchair and medication management long-term to keep his blood pressure stable.      Interval Problem Update  Overnight patient states his suicidality has improved again slightly.  At this point patient has been medically cleared and he is ready for discharge to an inpatient psychiatric facility once accepted.  In the meantime optimize patient's diet, medications, continue with physical therapy and Occupational Therapy.    Consultants/Specialty  Psychiatry    Code Status  Full code    Disposition  To be determined    Review of Systems  Review of Systems   Constitutional: Negative.  Negative for chills, diaphoresis and fever.   HENT: Negative.  Negative for ear discharge, hearing loss, nosebleeds and tinnitus.    Eyes: Negative.  Negative for pain and discharge.   Respiratory: Negative.  Negative for sputum production and shortness of breath.    Cardiovascular: Negative.  Negative for chest pain, palpitations and leg swelling.   Gastrointestinal: Negative.  Negative for constipation, diarrhea, heartburn and nausea.   Genitourinary: Negative.  Negative for dysuria, frequency and urgency.   Musculoskeletal: Positive for back pain. Negative for myalgias and neck pain.   Skin: Negative.    Neurological: Negative.  Negative for dizziness, tingling, sensory change, seizures and headaches.   Endo/Heme/Allergies: Negative.   Negative for environmental allergies. Does not bruise/bleed easily.   Psychiatric/Behavioral: Positive for suicidal ideas. Negative for depression. The patient does not have insomnia.         Nightmares   All other systems reviewed and are negative.       Physical Exam  Temp:  [36.4 °C (97.5 °F)-36.8 °C (98.3 °F)] 36.8 °C (98.3 °F)  Pulse:  [61-72] 72  Resp:  [17] 17  BP: (124-153)/(64-82) 144/82  SpO2:  [91 %-97 %] 95 %    Physical Exam   Constitutional: He is oriented to person, place, and time. He appears well-developed and well-nourished.   HENT:   Head: Normocephalic and atraumatic.   Right Ear: External ear normal.   Left Ear: External ear normal.   Nose: Nose normal.   Mouth/Throat: Oropharynx is clear and moist.   Eyes: Pupils are equal, round, and reactive to light. Conjunctivae and EOM are normal. Right eye exhibits no discharge. Left eye exhibits no discharge.   Neck: Normal range of motion. Neck supple. No JVD present. No tracheal deviation present.   Cardiovascular: Normal rate and regular rhythm.  Exam reveals no friction rub.    No murmur heard.  Pulmonary/Chest: Effort normal and breath sounds normal. No respiratory distress. He has no wheezes. He has no rales.   Abdominal: Soft. Bowel sounds are normal. He exhibits no distension and no mass. There is no tenderness.   Musculoskeletal: Normal range of motion. He exhibits no edema or deformity.   Neurological: He is alert and oriented to person, place, and time. He has normal reflexes. No cranial nerve deficit. Coordination normal.   Skin: Skin is warm and dry. No erythema.   Psychiatric: He has a normal mood and affect. His speech is normal and behavior is normal. Judgment and thought content normal. Cognition and memory are normal. He does not exhibit a depressed mood. He expresses no suicidal ideation.   Nursing note and vitals reviewed.      Fluids    Intake/Output Summary (Last 24 hours) at 02/01/19 1519  Last data filed at 02/01/19 1000   Gross  per 24 hour   Intake              480 ml   Output             1480 ml   Net            -1000 ml       Laboratory                        Imaging  DX-HIP-UNILATERAL-WITH PELVIS-1 VIEW RIGHT   Final Result      No radiographic evidence of acute traumatic injury right hip.      DX-CHEST-PORTABLE (1 VIEW)   Final Result      Hypoinflation without other evidence for acute cardiopulmonary disease.      MR-BRAIN-W/O   Final Result      1.  MRI of the brain without contrast within normal limits for age with mild white matter changes.   2.  Chronic appearing sinus disease   3.  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 This does not have an aggressive appearance.      US-EXTREMITY VENOUS LOWER BILAT   Final Result      CT-HAND WITH RIGHT   Final Result      1.  Edema in the dorsum of the forearm and hand without a discrete fluid collection to suggest abscess is identified.      US-CAROTID DOPPLER BILAT   Final Result      DX-LUMBAR SPINE-2 OR 3 VIEWS   Final Result         Stable compression deformity in the L1 vertebral body.      CT-CTA CHEST PULMONARY ARTERY W/ RECONS   Final Result         1. No CT evidence of pulmonary embolism.      2. Old right rib fractures.      3. Cholelithiasis. Enlarged spleen with granulomas.      4. Mild superior endplate compression deformity of L1 could be acute. Correlate with point tenderness. No retropulsion. No malalignment.      CT-CSPINE WITHOUT PLUS RECONS   Final Result         1. No acute fracture from C1 through T3 is visualized.         CT-HEAD W/O   Final Result         1. No acute intracranial abnormality. No evidence of acute intracranial hemorrhage or mass lesion.               DX-THORACIC SPINE-WITH SWIMMERS VIEW   Final Result      No acute thoracic spine fracture or subluxation identified.      DX-CHEST-PORTABLE (1 VIEW)   Final Result      No acute cardiac or pulmonary abnormality is noted.           Assessment/Plan  *  Orthostatic hypotension- (present on admission)   Assessment & Plan    Patient's orthostatics at this point have resolved and currently is 141/81.     Suicidal ideation   Assessment & Plan    He says daily he is getting better and better with suicidal thoughts.  At this point he has been medically cleared and he is ready to discharge to an inpatient facility for psychiatric treatment.     Fall from ground level   Assessment & Plan    Earlier the patient had a fall from a ground-level this has now resolved.     Lumbar compression fracture (HCC)   Assessment & Plan    Patient is to wear a TLSO brace while he is ambulating.  Also continued on pain management optimization     Psychogenic nonepileptic seizure- (present on admission)   Assessment & Plan    Patient was initially evaluated pseudoseizures.  At this point neurology does not recommend any antiseizure management long-term.     Vitamin B12 deficiency- (present on admission)   Assessment & Plan    Optimized on monthly vitamin B12 injections follow at this point hemoglobin hematocrit long-term.     Syncope- (present on admission)   Assessment & Plan    With diabetes mellitus type 2 that was uncontrolled patient has developed severe dysautonomia which has caused hypotension and recurrent episodes of syncope.  Currently the patient is on Midodrin and the patient's blood pressures are at 141/81.  The patient currently has not had any syncopal episodes since he is been optimized on his medications.  He did have a complete syncope workup which was negative.  This included MRI of the head, echocardiogram and carotids.           Debility- (present on admission)   Assessment & Plan    Continued with therapies     Acute respiratory failure with hypoxia (HCC)   Assessment & Plan    Resolved     Chronic diastolic heart failure (HCC)- (present on admission)   Assessment & Plan    Medically optimized and the patient currently is not in heart failure.     Bilateral leg edema-  (present on admission)   Assessment & Plan    Monitor at this point his leg swelling so far today the leg swelling was stable     Venous stasis dermatitis of both lower extremities- (present on admission)   Assessment & Plan    As necessary continue with sequentials of the lower extremity     Paroxysmal A-fib (HCC)- (present on admission)   Assessment & Plan    Currently patient's atrial fibrillation is rate controlled his heart rate currently 64.  At this point due to his risk of falls I do not suggest anticoagulation.     Essential hypertension- (present on admission)   Assessment & Plan    Blood pressure management at this point is stable most recent blood pressure 141/81 currently is not on blood pressure pills due to the fact that he was having some hypotension.     Mixed hyperlipidemia- (present on admission)   Assessment & Plan    Low-fat low-cholesterol diet and statin.  Lab Results   Component Value Date/Time    CHOLSTRLTOT 104 11/22/2018 12:07 AM    LDL 60 11/22/2018 12:07 AM    HDL 27 (A) 11/22/2018 12:07 AM    TRIGLYCERIDE 87 11/22/2018 12:07 AM                    VTE prophylaxis: Heparin

## 2019-02-01 NOTE — DISCHARGE PLANNING
Agency/Facility Name: [facility]   Spoke To: Elidia   Outcome: Patient declined for being to medically complex.     Agency/Facility Name: [facility]  Spoke To: Lena  Outcome: Patient is 25 patients back on the list before being reviewed.     JAMES Oropeza notified.

## 2019-02-01 NOTE — PROGRESS NOTES
Patient iv site red and painful, iv not patent. Removed. Patient refusing new piv placement. Will notify MD.

## 2019-02-01 NOTE — PROGRESS NOTES
"Assumed care at 0700 after report received from night RN. Pt A/Ox4, on RA, pain rating 8/10 in lower back, pain meds given at 0530, heat applied to back. All Fall precautions in place. 1:1 sitter at bedside for pt SI, pt states he is still having \"those thoughts\". Stated he does not feel his depression meds working yet. Pt IV came out overnight, will discuss with MD if access still needed at this time. Will continue to monitor.  "

## 2019-02-02 PROCEDURE — A9270 NON-COVERED ITEM OR SERVICE: HCPCS | Performed by: HOSPITALIST

## 2019-02-02 PROCEDURE — 306310 ANTI-EMBOLISM STOCKINGS XXLRG REG: Performed by: HOSPITALIST

## 2019-02-02 PROCEDURE — 700102 HCHG RX REV CODE 250 W/ 637 OVERRIDE(OP): Performed by: HOSPITALIST

## 2019-02-02 PROCEDURE — 700102 HCHG RX REV CODE 250 W/ 637 OVERRIDE(OP): Performed by: INTERNAL MEDICINE

## 2019-02-02 PROCEDURE — A9270 NON-COVERED ITEM OR SERVICE: HCPCS | Performed by: INTERNAL MEDICINE

## 2019-02-02 PROCEDURE — 99225 PR SUBSEQUENT OBSERVATION CARE,LEVEL II: CPT | Performed by: HOSPITALIST

## 2019-02-02 PROCEDURE — G0378 HOSPITAL OBSERVATION PER HR: HCPCS

## 2019-02-02 PROCEDURE — A9270 NON-COVERED ITEM OR SERVICE: HCPCS | Performed by: PSYCHIATRY & NEUROLOGY

## 2019-02-02 PROCEDURE — 306311 ANTI-EMBOLISM STOCKINGS XXLRG LNG: Performed by: HOSPITALIST

## 2019-02-02 PROCEDURE — 700102 HCHG RX REV CODE 250 W/ 637 OVERRIDE(OP): Performed by: PSYCHIATRY & NEUROLOGY

## 2019-02-02 RX ORDER — ALBUTEROL SULFATE 90 UG/1
2 AEROSOL, METERED RESPIRATORY (INHALATION)
Status: DISCONTINUED | OUTPATIENT
Start: 2019-02-02 | End: 2019-02-02

## 2019-02-02 RX ADMIN — OXYCODONE HYDROCHLORIDE 5 MG: 5 TABLET ORAL at 12:19

## 2019-02-02 RX ADMIN — MIDODRINE HYDROCHLORIDE 10 MG: 5 TABLET ORAL at 12:18

## 2019-02-02 RX ADMIN — Medication 400 MG: at 05:12

## 2019-02-02 RX ADMIN — FLUOXETINE HYDROCHLORIDE 60 MG: 20 CAPSULE ORAL at 05:11

## 2019-02-02 RX ADMIN — ASPIRIN 81 MG 81 MG: 81 TABLET ORAL at 05:12

## 2019-02-02 RX ADMIN — PYRIDOSTIGMINE BROMIDE 90 MG: 60 TABLET ORAL at 12:20

## 2019-02-02 RX ADMIN — METOCLOPRAMIDE HYDROCHLORIDE 10 MG: 10 TABLET ORAL at 08:27

## 2019-02-02 RX ADMIN — PYRIDOSTIGMINE BROMIDE 90 MG: 60 TABLET ORAL at 05:16

## 2019-02-02 RX ADMIN — OXYCODONE HYDROCHLORIDE 5 MG: 5 TABLET ORAL at 08:27

## 2019-02-02 RX ADMIN — METOCLOPRAMIDE HYDROCHLORIDE 10 MG: 10 TABLET ORAL at 17:24

## 2019-02-02 RX ADMIN — MIDODRINE HYDROCHLORIDE 10 MG: 5 TABLET ORAL at 17:23

## 2019-02-02 RX ADMIN — SODIUM CHLORIDE TAB 1 GM 1 G: 1 TAB at 12:23

## 2019-02-02 RX ADMIN — PYRIDOSTIGMINE BROMIDE 90 MG: 60 TABLET ORAL at 17:23

## 2019-02-02 RX ADMIN — OXYCODONE HYDROCHLORIDE 5 MG: 5 TABLET ORAL at 17:22

## 2019-02-02 RX ADMIN — MIDODRINE HYDROCHLORIDE 10 MG: 5 TABLET ORAL at 08:27

## 2019-02-02 RX ADMIN — AMIODARONE HYDROCHLORIDE 50 MG: 200 TABLET ORAL at 05:12

## 2019-02-02 RX ADMIN — FLUDROCORTISONE ACETATE 0.3 MG: 0.1 TABLET ORAL at 05:16

## 2019-02-02 RX ADMIN — METOCLOPRAMIDE HYDROCHLORIDE 10 MG: 10 TABLET ORAL at 12:18

## 2019-02-02 RX ADMIN — SODIUM CHLORIDE TAB 1 GM 1 G: 1 TAB at 08:27

## 2019-02-02 RX ADMIN — OXYCODONE HYDROCHLORIDE AND ACETAMINOPHEN 500 MG: 500 TABLET ORAL at 05:13

## 2019-02-02 RX ADMIN — MULTIPLE VITAMINS W/ MINERALS TAB 1 TABLET: TAB at 05:12

## 2019-02-02 RX ADMIN — SODIUM CHLORIDE TAB 1 GM 1 G: 1 TAB at 17:22

## 2019-02-02 ASSESSMENT — ENCOUNTER SYMPTOMS
DEPRESSION: 0
FLANK PAIN: 0
TINGLING: 0
RESPIRATORY NEGATIVE: 1
MYALGIAS: 0
FALLS: 0
MUSCULOSKELETAL NEGATIVE: 1
PHOTOPHOBIA: 0
NEUROLOGICAL NEGATIVE: 1
CLAUDICATION: 0
ABDOMINAL PAIN: 0
SENSORY CHANGE: 0
WEAKNESS: 0
BLURRED VISION: 0
GASTROINTESTINAL NEGATIVE: 1
SORE THROAT: 0
HEARTBURN: 0
CONSTITUTIONAL NEGATIVE: 1
VOMITING: 0
MEMORY LOSS: 0
PND: 0
WEIGHT LOSS: 0
CARDIOVASCULAR NEGATIVE: 1
EYES NEGATIVE: 1
SPUTUM PRODUCTION: 0
SHORTNESS OF BREATH: 0
INSOMNIA: 1
TREMORS: 0
SINUS PAIN: 0

## 2019-02-02 ASSESSMENT — LIFESTYLE VARIABLES: SUBSTANCE_ABUSE: 0

## 2019-02-02 NOTE — CARE PLAN
Problem: Communication  Goal: The ability to communicate needs accurately and effectively will improve  Communication board updated. POC discussed with pt.     Problem: Safety  Goal: Will remain free from injury  Outcome: PROGRESSING AS EXPECTED  Pt has 1:1 sitter at bedside. All potential harmful objects removed pt under direct supervision. Hourly rounding by this RN.   Pt is a high fall risk. edu provided and pt placed on a bed alarm. Pt verbalizing understanding and now agreeable to BA.

## 2019-02-02 NOTE — PROGRESS NOTES
"Assumed care at 0700 after report received from night RN. Pt A/Ox4, on RA, pain rating 5/10 in lower back, heat pack applied to back. All Fall precautions in place. 1:1 sitter at bedside for pt SI, pt states he is still having SI and slept, \"OK\". Will continue to monitor.  "

## 2019-02-02 NOTE — PSYCHIATRY
PSYCHIATRIC FOLLOW UP:       Reason for consult: functional seizures   Requesting Physician: Slime     HPI:       Sebastián Castro is a 53 y.o. year old male with a PMH of CHF, a fib, DM, HTN,  a fib s/p cardioversion, orthostatic hypotentison with depressed mood, PTSD, and some functional seizures. He was also noted to have an L1 compression fracture.     Still having depressed mood, suicidal thoughts, nightmares, flashbacks. He states it has improved slightly but not much. He is hearing voices. Discussed medications that might help with this, he agreed to try geodon. He is sitting up in wheelchair. He has been coming out of room and conversing more.       Psychiatric Examination: observed phenomenon:  Vitals: /82   Pulse 72   Temp 36.8 °C (98.3 °F) (Temporal)   Resp 17   Ht 1.829 m (6')   Wt 108.5 kg (239 lb 3.2 oz)   SpO2 95%   BMI 32.44 kg/m²  Body mass index is 32.44 kg/m².  Musculoskeletal psychomotor retardation   Appearance: grooming fair   Thoughts Process:Logical and sequential, goal-directed   Thought Content:  No a/vh, no evidence of delusions, no ideas of reference, no internal stimulation noted   Speech: Nl tone, rate, and volume. Not pressured. Understandable.   Mood:    depressed  Affect:    constricted  SI/HI:   +si with plan   Attention/Alertness:   Awake, alert.   Memory:    Grossly intact.   Orientation:    Grossly intact.   Cognition:Grossly intact.   Insight:fair   Judgement:  Fair   Neurological Testing:    Medical systems reviewed:   Eyes:no blurry vision   Skin: no rash   CV: no palpitations no cp   Lungs:no sob   Neuro: no numbness tingling, no ha.   GI:no n/v   :no dysuria   Endocrine:  Psych:see hpi   Musculoskeletal: some pain   All other systems reviewed and are negative.       Soc history:    Appears he has been denied at [facility] due to being a fall risk   Cannot live at homeless shelter with his condition.   PT has stated he will need home health, wheelchair,  shahla juarez     Lab results/tests:   No results found for this or any previous visit (from the past 48 hour(s)).  DX-HIP-UNILATERAL-WITH PELVIS-1 VIEW RIGHT   Final Result      No radiographic evidence of acute traumatic injury right hip.      DX-CHEST-PORTABLE (1 VIEW)   Final Result      Hypoinflation without other evidence for acute cardiopulmonary disease.      MR-BRAIN-W/O   Final Result      1.  MRI of the brain without contrast within normal limits for age with mild white matter changes.   2.  Chronic appearing sinus disease   3.  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 This does not have an aggressive appearance.      US-EXTREMITY VENOUS LOWER BILAT   Final Result      CT-HAND WITH RIGHT   Final Result      1.  Edema in the dorsum of the forearm and hand without a discrete fluid collection to suggest abscess is identified.      US-CAROTID DOPPLER BILAT   Final Result      DX-LUMBAR SPINE-2 OR 3 VIEWS   Final Result         Stable compression deformity in the L1 vertebral body.      CT-CTA CHEST PULMONARY ARTERY W/ RECONS   Final Result         1. No CT evidence of pulmonary embolism.      2. Old right rib fractures.      3. Cholelithiasis. Enlarged spleen with granulomas.      4. Mild superior endplate compression deformity of L1 could be acute. Correlate with point tenderness. No retropulsion. No malalignment.      CT-CSPINE WITHOUT PLUS RECONS   Final Result         1. No acute fracture from C1 through T3 is visualized.         CT-HEAD W/O   Final Result         1. No acute intracranial abnormality. No evidence of acute intracranial hemorrhage or mass lesion.               DX-THORACIC SPINE-WITH SWIMMERS VIEW   Final Result      No acute thoracic spine fracture or subluxation identified.      DX-CHEST-PORTABLE (1 VIEW)   Final Result      No acute cardiac or pulmonary abnormality is noted.               Assessment:       MDD, severe, without psychotic  features     Plan:  legal hold: extending     Continue prozac  Starting geodon at night.

## 2019-02-02 NOTE — PROGRESS NOTES
Assumed care of pt at 1930. Report received and bedside rounding completed with day RN. Pt in bed resting comfortably denies any pain at this time and is calm. No SOB, or in any acute distress.   Pt currently on a legal hold for SI sitter at bedside 1:1 direct supervision.     Fall precautions in place, pt is aaox4 education provided high fal risk level. Pt agreeable now for a   bed alarm. - Treaded non slip socks. Call light within reach - pt makes needs known - hourly rounding in place. See flowsheets for further assessment.

## 2019-02-02 NOTE — CARE PLAN
Problem: Pain Management  Goal: Pain level will decrease to patient's comfort goal  Outcome: PROGRESSING AS EXPECTED      Problem: Bowel/Gastric:  Goal: Will not experience complications related to bowel motility  Outcome: PROGRESSING AS EXPECTED  Pt has regular BM

## 2019-02-02 NOTE — PROGRESS NOTES
Hospital Medicine Daily Progress Note    Date of Service  2/2/2019    Chief Complaint  53 y.o. male admitted 12/28/2018 with suicide ideation    Hospital Course    Patient has been admitted now since the last month and he has had suicidal ideation.  Patient at this point unfortunately continues to be suicidal.  Medications at this point are being actively adjusted.  The patient at this point will need a discharge plan medically at this point he is optimized and stable he does have chronic dysautonomia secondary to uncontrolled diabetes and because of this he is hypotensive.  The patient will need a wheelchair and medication management long-term to keep his blood pressure stable.      Interval Problem Update  Overnight patient reports that he has had several nightmares.  He feels that the nightmares were second to the medications that he was recently placed on by psychiatry.  I have explained to him that he is really in control of his dreams and his nightmares should be able to be controlled.  The patient at this point otherwise has no pain currently he is medically optimized and cleared he is anticipating discharge to a inpatient psychiatric facility.    Consultants/Specialty  Psychiatry    Code Status  Full code    Disposition  To be determined    Review of Systems  Review of Systems   Constitutional: Negative.  Negative for weight loss.   HENT: Negative.  Negative for sinus pain and sore throat.    Eyes: Negative.  Negative for blurred vision and photophobia.   Respiratory: Negative.  Negative for sputum production and shortness of breath.    Cardiovascular: Negative.  Negative for chest pain, claudication and PND.   Gastrointestinal: Negative.  Negative for abdominal pain, heartburn and vomiting.   Genitourinary: Negative.  Negative for dysuria and flank pain.   Musculoskeletal: Negative.  Negative for falls and myalgias.   Skin: Negative.    Neurological: Negative.  Negative for tingling, tremors, sensory change  and weakness.   Endo/Heme/Allergies: Negative.    Psychiatric/Behavioral: Negative for depression, memory loss, substance abuse and suicidal ideas. The patient has insomnia (with nightmares).    All other systems reviewed and are negative.       Physical Exam  Temp:  [36.4 °C (97.6 °F)-37 °C (98.6 °F)] 36.5 °C (97.7 °F)  Pulse:  [58-66] 64  Resp:  [16-18] 16  BP: (120-169)/(64-74) 139/72  SpO2:  [91 %-98 %] 97 %    Physical Exam   Constitutional: He is oriented to person, place, and time. He appears well-developed and well-nourished. No distress.   HENT:   Head: Normocephalic and atraumatic.   Right Ear: External ear normal.   Left Ear: External ear normal.   Eyes: Pupils are equal, round, and reactive to light. Conjunctivae and EOM are normal.   Neck: Normal range of motion. Neck supple. No tracheal deviation present. No thyromegaly present.   Cardiovascular: Normal rate and regular rhythm.    Pulmonary/Chest: Effort normal and breath sounds normal. No respiratory distress.   Abdominal: Soft. Bowel sounds are normal. He exhibits no distension. There is no tenderness. There is no rebound.   Musculoskeletal: Normal range of motion. He exhibits no edema or deformity.   Neurological: He is alert and oriented to person, place, and time. He has normal reflexes. He displays normal reflexes. No cranial nerve deficit. He exhibits normal muscle tone.   Skin: Skin is warm and dry. No rash noted. He is not diaphoretic. No erythema.   Psychiatric: He has a normal mood and affect. His speech is normal and behavior is normal. Judgment and thought content normal. Cognition and memory are normal. He does not exhibit a depressed mood. He expresses no suicidal ideation.   Nursing note and vitals reviewed.      Fluids    Intake/Output Summary (Last 24 hours) at 02/02/19 1603  Last data filed at 02/02/19 1336   Gross per 24 hour   Intake              835 ml   Output             1150 ml   Net             -315 ml       Laboratory                         Imaging  DX-HIP-UNILATERAL-WITH PELVIS-1 VIEW RIGHT   Final Result      No radiographic evidence of acute traumatic injury right hip.      DX-CHEST-PORTABLE (1 VIEW)   Final Result      Hypoinflation without other evidence for acute cardiopulmonary disease.      MR-BRAIN-W/O   Final Result      1.  MRI of the brain without contrast within normal limits for age with mild white matter changes.   2.  Chronic appearing sinus disease   3.  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 This does not have an aggressive appearance.      US-EXTREMITY VENOUS LOWER BILAT   Final Result      CT-HAND WITH RIGHT   Final Result      1.  Edema in the dorsum of the forearm and hand without a discrete fluid collection to suggest abscess is identified.      US-CAROTID DOPPLER BILAT   Final Result      DX-LUMBAR SPINE-2 OR 3 VIEWS   Final Result         Stable compression deformity in the L1 vertebral body.      CT-CTA CHEST PULMONARY ARTERY W/ RECONS   Final Result         1. No CT evidence of pulmonary embolism.      2. Old right rib fractures.      3. Cholelithiasis. Enlarged spleen with granulomas.      4. Mild superior endplate compression deformity of L1 could be acute. Correlate with point tenderness. No retropulsion. No malalignment.      CT-CSPINE WITHOUT PLUS RECONS   Final Result         1. No acute fracture from C1 through T3 is visualized.         CT-HEAD W/O   Final Result         1. No acute intracranial abnormality. No evidence of acute intracranial hemorrhage or mass lesion.               DX-THORACIC SPINE-WITH SWIMMERS VIEW   Final Result      No acute thoracic spine fracture or subluxation identified.      DX-CHEST-PORTABLE (1 VIEW)   Final Result      No acute cardiac or pulmonary abnormality is noted.           Assessment/Plan  * Orthostatic hypotension- (present on admission)   Assessment & Plan    Controlled and actually blood pressures are currently  high.     Suicidal ideation   Assessment & Plan    Improved, continue at this point with anti-psychiatric medication management.     Fall from ground level   Assessment & Plan    Resolved     Lumbar compression fracture (HCC)   Assessment & Plan    Continue with TLSO brace and pain management     Psychogenic nonepileptic seizure- (present on admission)   Assessment & Plan    Seizures are controlled and I have not seen him in.     Vitamin B12 deficiency- (present on admission)   Assessment & Plan    Patient is receiving monthly intramuscular B12 injections.     Syncope- (present on admission)   Assessment & Plan    Syncope is believed to have been caused by dysautonomia secondary to diabetes mellitus type 2 that was uncontrolled.  Currently blood pressures have stabilized he is actually high on the blood pressure today 169/74.  Negative workup for syncope including MRI of the head echocardiogram and carotids.               Debility- (present on admission)   Assessment & Plan    Continued with therapies     Acute respiratory failure with hypoxia (HCC)   Assessment & Plan    Resolved     Chronic diastolic heart failure (HCC)- (present on admission)   Assessment & Plan    Stable without exacerbation.     Bilateral leg edema- (present on admission)   Assessment & Plan    Improved     Venous stasis dermatitis of both lower extremities- (present on admission)   Assessment & Plan    Continue with sequentials     Paroxysmal A-fib (HCC)- (present on admission)   Assessment & Plan    Rate controlled     Essential hypertension- (present on admission)   Assessment & Plan    Monitor blood pressure     Mixed hyperlipidemia- (present on admission)   Assessment & Plan    Low-fat low-cholesterol diet and statin.  Lab Results   Component Value Date/Time    CHOLSTRLTOT 104 11/22/2018 12:07 AM    LDL 60 11/22/2018 12:07 AM    HDL 27 (A) 11/22/2018 12:07 AM    TRIGLYCERIDE 87 11/22/2018 12:07 AM                    VTE prophylaxis:  Heparin

## 2019-02-03 PROCEDURE — A9270 NON-COVERED ITEM OR SERVICE: HCPCS | Performed by: HOSPITALIST

## 2019-02-03 PROCEDURE — 700102 HCHG RX REV CODE 250 W/ 637 OVERRIDE(OP): Performed by: INTERNAL MEDICINE

## 2019-02-03 PROCEDURE — 99225 PR SUBSEQUENT OBSERVATION CARE,LEVEL II: CPT | Performed by: HOSPITALIST

## 2019-02-03 PROCEDURE — 700102 HCHG RX REV CODE 250 W/ 637 OVERRIDE(OP): Performed by: HOSPITALIST

## 2019-02-03 PROCEDURE — G0378 HOSPITAL OBSERVATION PER HR: HCPCS

## 2019-02-03 PROCEDURE — 700102 HCHG RX REV CODE 250 W/ 637 OVERRIDE(OP): Performed by: PSYCHIATRY & NEUROLOGY

## 2019-02-03 PROCEDURE — A9270 NON-COVERED ITEM OR SERVICE: HCPCS | Performed by: INTERNAL MEDICINE

## 2019-02-03 PROCEDURE — A9270 NON-COVERED ITEM OR SERVICE: HCPCS | Performed by: PSYCHIATRY & NEUROLOGY

## 2019-02-03 RX ADMIN — OXYCODONE HYDROCHLORIDE 5 MG: 5 TABLET ORAL at 08:49

## 2019-02-03 RX ADMIN — FLUDROCORTISONE ACETATE 0.3 MG: 0.1 TABLET ORAL at 05:07

## 2019-02-03 RX ADMIN — METOCLOPRAMIDE HYDROCHLORIDE 10 MG: 10 TABLET ORAL at 08:49

## 2019-02-03 RX ADMIN — OXYCODONE HYDROCHLORIDE AND ACETAMINOPHEN 500 MG: 500 TABLET ORAL at 05:05

## 2019-02-03 RX ADMIN — MIDODRINE HYDROCHLORIDE 10 MG: 5 TABLET ORAL at 16:54

## 2019-02-03 RX ADMIN — FLUOXETINE HYDROCHLORIDE 60 MG: 20 CAPSULE ORAL at 05:04

## 2019-02-03 RX ADMIN — MIDODRINE HYDROCHLORIDE 10 MG: 5 TABLET ORAL at 12:12

## 2019-02-03 RX ADMIN — OXYCODONE HYDROCHLORIDE 5 MG: 5 TABLET ORAL at 16:59

## 2019-02-03 RX ADMIN — ACETAMINOPHEN 650 MG: 325 TABLET, FILM COATED ORAL at 08:49

## 2019-02-03 RX ADMIN — Medication 400 MG: at 05:05

## 2019-02-03 RX ADMIN — SENNOSIDES AND DOCUSATE SODIUM 2 TABLET: 8.6; 5 TABLET ORAL at 16:54

## 2019-02-03 RX ADMIN — MULTIPLE VITAMINS W/ MINERALS TAB 1 TABLET: TAB at 05:05

## 2019-02-03 RX ADMIN — PYRIDOSTIGMINE BROMIDE 90 MG: 60 TABLET ORAL at 12:11

## 2019-02-03 RX ADMIN — SODIUM CHLORIDE TAB 1 GM 1 G: 1 TAB at 12:12

## 2019-02-03 RX ADMIN — PYRIDOSTIGMINE BROMIDE 90 MG: 60 TABLET ORAL at 16:54

## 2019-02-03 RX ADMIN — SODIUM CHLORIDE TAB 1 GM 1 G: 1 TAB at 08:49

## 2019-02-03 RX ADMIN — ASPIRIN 81 MG 81 MG: 81 TABLET ORAL at 05:05

## 2019-02-03 RX ADMIN — AMIODARONE HYDROCHLORIDE 50 MG: 200 TABLET ORAL at 05:05

## 2019-02-03 RX ADMIN — PYRIDOSTIGMINE BROMIDE 90 MG: 60 TABLET ORAL at 05:06

## 2019-02-03 RX ADMIN — SODIUM CHLORIDE TAB 1 GM 1 G: 1 TAB at 16:54

## 2019-02-03 RX ADMIN — METOCLOPRAMIDE HYDROCHLORIDE 10 MG: 10 TABLET ORAL at 16:54

## 2019-02-03 RX ADMIN — MIDODRINE HYDROCHLORIDE 10 MG: 5 TABLET ORAL at 08:49

## 2019-02-03 RX ADMIN — METOCLOPRAMIDE HYDROCHLORIDE 10 MG: 10 TABLET ORAL at 12:11

## 2019-02-03 RX ADMIN — ACETAMINOPHEN 650 MG: 325 TABLET, FILM COATED ORAL at 16:59

## 2019-02-03 ASSESSMENT — ENCOUNTER SYMPTOMS
GASTROINTESTINAL NEGATIVE: 1
CONSTITUTIONAL NEGATIVE: 1
BACK PAIN: 0
COUGH: 0
SHORTNESS OF BREATH: 0
MYALGIAS: 0
DIAPHORESIS: 0
EYE PAIN: 0
DIARRHEA: 0
CARDIOVASCULAR NEGATIVE: 1
SPUTUM PRODUCTION: 0
DOUBLE VISION: 0
DIZZINESS: 0
EYE DISCHARGE: 0
BLURRED VISION: 0
PALPITATIONS: 0
SPEECH CHANGE: 0
DEPRESSION: 0
EYES NEGATIVE: 1
INSOMNIA: 1
ORTHOPNEA: 0
HEMOPTYSIS: 0
CHILLS: 0
CONSTIPATION: 0
MEMORY LOSS: 0
MUSCULOSKELETAL NEGATIVE: 1
FOCAL WEAKNESS: 0
VOMITING: 0
NECK PAIN: 0
HEADACHES: 0
NEUROLOGICAL NEGATIVE: 1
HEARTBURN: 0
HALLUCINATIONS: 0
RESPIRATORY NEGATIVE: 1

## 2019-02-03 ASSESSMENT — LIFESTYLE VARIABLES: SUBSTANCE_ABUSE: 0

## 2019-02-03 NOTE — PROGRESS NOTES
Hospital Medicine Daily Progress Note    Date of Service  2/3/2019    Chief Complaint  53 y.o. male admitted 12/28/2018 with suicide ideation    Hospital Course    Patient has been admitted now since the last month and he has had suicidal ideation.  Patient at this point unfortunately continues to be suicidal.  Medications at this point are being actively adjusted.  The patient at this point will need a discharge plan medically at this point he is optimized and stable he does have chronic dysautonomia secondary to uncontrolled diabetes and because of this he is hypotensive.  The patient will need a wheelchair and medication management long-term to keep his blood pressure stable.      Interval Problem Update  Patient this morning is sitting up in the chair looking out at the snowfall.  The patient states that he has had much better thoughts overnight.  I have talked to him in detail yesterday about being optimistic and having positive thoughts.  The patient at this point has minimal discomfort and he says he really does not have any pain.  He uses a TLSO brace for his back he is at this point otherwise medically optimized and cleared for discharge we are anticipating discharge to an inpatient psychiatric facility once accepted.    Consultants/Specialty  Psychiatry    Code Status  Full code    Disposition  To be determined    Review of Systems  Review of Systems   Constitutional: Negative.  Negative for chills, diaphoresis and malaise/fatigue.   HENT: Negative.  Negative for ear discharge and nosebleeds.    Eyes: Negative.  Negative for blurred vision, double vision, pain and discharge.   Respiratory: Negative.  Negative for cough, hemoptysis, sputum production and shortness of breath.    Cardiovascular: Negative.  Negative for chest pain, palpitations and orthopnea.   Gastrointestinal: Negative.  Negative for constipation, diarrhea, heartburn and vomiting.   Genitourinary: Negative.  Negative for dysuria, frequency  and urgency.   Musculoskeletal: Negative.  Negative for back pain, myalgias and neck pain.   Skin: Negative.  Negative for itching and rash.   Neurological: Negative.  Negative for dizziness, speech change, focal weakness and headaches.   Endo/Heme/Allergies: Negative.    Psychiatric/Behavioral: Negative for depression, hallucinations, memory loss, substance abuse and suicidal ideas. The patient has insomnia (with nightmares).    All other systems reviewed and are negative.       Physical Exam  Temp:  [35.8 °C (96.5 °F)-36.8 °C (98.3 °F)] 35.8 °C (96.5 °F)  Pulse:  [58-66] 66  Resp:  [16-17] 16  BP: (122-166)/(63-85) 166/85  SpO2:  [92 %-97 %] 97 %    Physical Exam   Constitutional: He is oriented to person, place, and time. He appears well-developed and well-nourished.   HENT:   Head: Normocephalic and atraumatic.   Right Ear: External ear normal.   Left Ear: External ear normal.   Mouth/Throat: No oropharyngeal exudate.   Eyes: Pupils are equal, round, and reactive to light. Conjunctivae and EOM are normal. Right eye exhibits no discharge. Left eye exhibits no discharge.   Neck: Normal range of motion. Neck supple. No JVD present.   Cardiovascular: Normal rate and regular rhythm.  Exam reveals friction rub.    No murmur heard.  Pulmonary/Chest: Effort normal and breath sounds normal. No stridor. No respiratory distress. He has no rales.   Abdominal: Soft. Bowel sounds are normal. He exhibits no distension. There is no guarding.   Musculoskeletal: Normal range of motion. He exhibits no edema or deformity.   Neurological: He is alert and oriented to person, place, and time. He has normal reflexes. No cranial nerve deficit. He exhibits normal muscle tone.   Skin: Skin is warm and dry. No rash noted. No erythema.   Psychiatric: He has a normal mood and affect. His speech is normal and behavior is normal. Judgment and thought content normal. Cognition and memory are normal. He does not exhibit a depressed mood. He  expresses no suicidal ideation.   Nursing note and vitals reviewed.      Fluids    Intake/Output Summary (Last 24 hours) at 02/03/19 1148  Last data filed at 02/03/19 1000   Gross per 24 hour   Intake              540 ml   Output             1075 ml   Net             -535 ml       Laboratory                        Imaging  DX-HIP-UNILATERAL-WITH PELVIS-1 VIEW RIGHT   Final Result      No radiographic evidence of acute traumatic injury right hip.      DX-CHEST-PORTABLE (1 VIEW)   Final Result      Hypoinflation without other evidence for acute cardiopulmonary disease.      MR-BRAIN-W/O   Final Result      1.  MRI of the brain without contrast within normal limits for age with mild white matter changes.   2.  Chronic appearing sinus disease   3.  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 This does not have an aggressive appearance.      US-EXTREMITY VENOUS LOWER BILAT   Final Result      CT-HAND WITH RIGHT   Final Result      1.  Edema in the dorsum of the forearm and hand without a discrete fluid collection to suggest abscess is identified.      US-CAROTID DOPPLER BILAT   Final Result      DX-LUMBAR SPINE-2 OR 3 VIEWS   Final Result         Stable compression deformity in the L1 vertebral body.      CT-CTA CHEST PULMONARY ARTERY W/ RECONS   Final Result         1. No CT evidence of pulmonary embolism.      2. Old right rib fractures.      3. Cholelithiasis. Enlarged spleen with granulomas.      4. Mild superior endplate compression deformity of L1 could be acute. Correlate with point tenderness. No retropulsion. No malalignment.      CT-CSPINE WITHOUT PLUS RECONS   Final Result         1. No acute fracture from C1 through T3 is visualized.         CT-HEAD W/O   Final Result         1. No acute intracranial abnormality. No evidence of acute intracranial hemorrhage or mass lesion.               DX-THORACIC SPINE-WITH SWIMMERS VIEW   Final Result      No acute thoracic  spine fracture or subluxation identified.      DX-CHEST-PORTABLE (1 VIEW)   Final Result      No acute cardiac or pulmonary abnormality is noted.           Assessment/Plan  * Orthostatic hypotension- (present on admission)   Assessment & Plan    Blood pressure at this point has been stable over the past several days     Suicidal ideation   Assessment & Plan    Improved     Fall from ground level   Assessment & Plan    Resolved     Lumbar compression fracture (HCC)   Assessment & Plan    TLSO brace when ambulating and pain management as needed     Psychogenic nonepileptic seizure- (present on admission)   Assessment & Plan    No seizures noted here.  Believed to be pseudoseizures does not medicated.     Vitamin B12 deficiency- (present on admission)   Assessment & Plan    Receiving monthly intramuscular B12 injections.     Syncope- (present on admission)   Assessment & Plan    Resolved.  Patient's diabetic dysautonomia is believed to be the cause.  Patient is on Midodrin and blood pressures have been staying actually stable.  Syncope workup was negative.  Medically at this point optimized and cleared for discharge               Debility- (present on admission)   Assessment & Plan    Continue with physical therapy and Occupational Therapy as tolerated.     Acute respiratory failure with hypoxia (HCC)   Assessment & Plan    Resolved     Chronic diastolic heart failure (HCC)- (present on admission)   Assessment & Plan    Medically optimized currently no exacerbation noted.  Currently cleared for discharge     Bilateral leg edema- (present on admission)   Assessment & Plan    Improved     Venous stasis dermatitis of both lower extremities- (present on admission)   Assessment & Plan    Remains with sequentials as needed     Paroxysmal A-fib (HCC)- (present on admission)   Assessment & Plan    Continue with rate control would forego anticoagulation with patient's history of falls as well as possible noncompliance      Essential hypertension- (present on admission)   Assessment & Plan    Monitor blood pressure     Mixed hyperlipidemia- (present on admission)   Assessment & Plan    Low-fat low-cholesterol diet and statin.  Lab Results   Component Value Date/Time    CHOLSTRLTOT 104 11/22/2018 12:07 AM    LDL 60 11/22/2018 12:07 AM    HDL 27 (A) 11/22/2018 12:07 AM    TRIGLYCERIDE 87 11/22/2018 12:07 AM                    VTE prophylaxis: Heparin

## 2019-02-03 NOTE — PROGRESS NOTES
Assumed care of pt at 1930. Report received and bedside rounding completed with day RN. Pt in bed resting comfortably denies any pain at this time and is calm. No SOB, or in any acute distress.   Pt currently on a legal hold for SI sitter at bedside 1:1 direct supervision.      Fall precautions in place, pt is aaox4 education provided high fal risk level. bed alarm. - Treaded non slip socks. Call light within reach - pt makes needs known - hourly rounding in place. See flowsheets for further assessment.

## 2019-02-04 PROCEDURE — G0378 HOSPITAL OBSERVATION PER HR: HCPCS

## 2019-02-04 PROCEDURE — 99225 PR SUBSEQUENT OBSERVATION CARE,LEVEL II: CPT | Performed by: HOSPITALIST

## 2019-02-04 PROCEDURE — 700102 HCHG RX REV CODE 250 W/ 637 OVERRIDE(OP): Performed by: HOSPITALIST

## 2019-02-04 PROCEDURE — 97116 GAIT TRAINING THERAPY: CPT

## 2019-02-04 PROCEDURE — A9270 NON-COVERED ITEM OR SERVICE: HCPCS | Performed by: HOSPITALIST

## 2019-02-04 PROCEDURE — A9270 NON-COVERED ITEM OR SERVICE: HCPCS | Performed by: INTERNAL MEDICINE

## 2019-02-04 PROCEDURE — 97535 SELF CARE MNGMENT TRAINING: CPT

## 2019-02-04 PROCEDURE — 700102 HCHG RX REV CODE 250 W/ 637 OVERRIDE(OP): Performed by: PSYCHIATRY & NEUROLOGY

## 2019-02-04 PROCEDURE — 700102 HCHG RX REV CODE 250 W/ 637 OVERRIDE(OP): Performed by: INTERNAL MEDICINE

## 2019-02-04 PROCEDURE — A9270 NON-COVERED ITEM OR SERVICE: HCPCS | Performed by: PSYCHIATRY & NEUROLOGY

## 2019-02-04 RX ORDER — RISPERIDONE 0.5 MG/1
0.5 TABLET ORAL EVERY EVENING
Status: DISCONTINUED | OUTPATIENT
Start: 2019-02-04 | End: 2019-02-06

## 2019-02-04 RX ORDER — MIDODRINE HYDROCHLORIDE 5 MG/1
10 TABLET ORAL 3 TIMES DAILY
Status: DISCONTINUED | OUTPATIENT
Start: 2019-02-04 | End: 2019-02-27 | Stop reason: HOSPADM

## 2019-02-04 RX ADMIN — ASPIRIN 81 MG 81 MG: 81 TABLET ORAL at 06:10

## 2019-02-04 RX ADMIN — MULTIPLE VITAMINS W/ MINERALS TAB 1 TABLET: TAB at 06:10

## 2019-02-04 RX ADMIN — METOCLOPRAMIDE HYDROCHLORIDE 10 MG: 10 TABLET ORAL at 12:33

## 2019-02-04 RX ADMIN — OXYCODONE HYDROCHLORIDE 5 MG: 5 TABLET ORAL at 08:22

## 2019-02-04 RX ADMIN — PYRIDOSTIGMINE BROMIDE 90 MG: 60 TABLET ORAL at 12:31

## 2019-02-04 RX ADMIN — SODIUM CHLORIDE TAB 1 GM 1 G: 1 TAB at 08:38

## 2019-02-04 RX ADMIN — AMIODARONE HYDROCHLORIDE 50 MG: 200 TABLET ORAL at 06:10

## 2019-02-04 RX ADMIN — RISPERIDONE 0.5 MG: 0.5 TABLET ORAL at 17:55

## 2019-02-04 RX ADMIN — PYRIDOSTIGMINE BROMIDE 90 MG: 60 TABLET ORAL at 06:09

## 2019-02-04 RX ADMIN — PYRIDOSTIGMINE BROMIDE 90 MG: 60 TABLET ORAL at 17:55

## 2019-02-04 RX ADMIN — METOCLOPRAMIDE HYDROCHLORIDE 10 MG: 10 TABLET ORAL at 17:55

## 2019-02-04 RX ADMIN — OXYCODONE HYDROCHLORIDE AND ACETAMINOPHEN 500 MG: 500 TABLET ORAL at 06:10

## 2019-02-04 RX ADMIN — MIDODRINE HYDROCHLORIDE 10 MG: 5 TABLET ORAL at 08:37

## 2019-02-04 RX ADMIN — SODIUM CHLORIDE TAB 1 GM 1 G: 1 TAB at 17:55

## 2019-02-04 RX ADMIN — SODIUM CHLORIDE TAB 1 GM 1 G: 1 TAB at 12:35

## 2019-02-04 RX ADMIN — MIDODRINE HYDROCHLORIDE 10 MG: 5 TABLET ORAL at 12:34

## 2019-02-04 RX ADMIN — FLUOXETINE HYDROCHLORIDE 60 MG: 20 CAPSULE ORAL at 06:10

## 2019-02-04 RX ADMIN — Medication 400 MG: at 06:10

## 2019-02-04 RX ADMIN — ACETAMINOPHEN 650 MG: 325 TABLET, FILM COATED ORAL at 08:22

## 2019-02-04 RX ADMIN — FLUDROCORTISONE ACETATE 0.3 MG: 0.1 TABLET ORAL at 06:10

## 2019-02-04 RX ADMIN — METOCLOPRAMIDE HYDROCHLORIDE 10 MG: 10 TABLET ORAL at 08:22

## 2019-02-04 ASSESSMENT — ENCOUNTER SYMPTOMS
SEIZURES: 0
CONSTIPATION: 0
COUGH: 0
EYE PAIN: 0
EYES NEGATIVE: 1
SORE THROAT: 0
DEPRESSION: 0
SPUTUM PRODUCTION: 0
BACK PAIN: 0
WHEEZING: 0
FEVER: 0
EYE DISCHARGE: 0
WEIGHT LOSS: 0
POLYDIPSIA: 0
MYALGIAS: 0
FOCAL WEAKNESS: 0
NAUSEA: 0
MUSCULOSKELETAL NEGATIVE: 1
GASTROINTESTINAL NEGATIVE: 1
SPEECH CHANGE: 0
CARDIOVASCULAR NEGATIVE: 1
INSOMNIA: 1
PND: 0
NEUROLOGICAL NEGATIVE: 1
CONSTITUTIONAL NEGATIVE: 1
ABDOMINAL PAIN: 0
RESPIRATORY NEGATIVE: 1

## 2019-02-04 ASSESSMENT — COGNITIVE AND FUNCTIONAL STATUS - GENERAL
MOBILITY SCORE: 22
SUGGESTED CMS G CODE MODIFIER MOBILITY: CJ
MOVING FROM LYING ON BACK TO SITTING ON SIDE OF FLAT BED: A LITTLE
CLIMB 3 TO 5 STEPS WITH RAILING: A LITTLE

## 2019-02-04 ASSESSMENT — LIFESTYLE VARIABLES: SUBSTANCE_ABUSE: 0

## 2019-02-04 ASSESSMENT — GAIT ASSESSMENTS
DEVIATION: BRADYKINETIC
ASSISTIVE DEVICE: FRONT WHEEL WALKER
DISTANCE (FEET): 70
GAIT LEVEL OF ASSIST: SUPERVISED

## 2019-02-04 NOTE — PROGRESS NOTES
Hospital Medicine Daily Progress Note    Date of Service  2/4/2019    Chief Complaint  53 y.o. male admitted 12/28/2018 with suicide ideation    Hospital Course    Patient has been admitted now since the last month and he has had suicidal ideation.  Patient at this point unfortunately continues to be suicidal.  Medications at this point are being actively adjusted.  The patient at this point will need a discharge plan medically at this point he is optimized and stable he does have chronic dysautonomia secondary to uncontrolled diabetes and because of this he is hypotensive.  The patient will need a wheelchair and medication management long-term to keep his blood pressure stable.      Interval Problem Update  Overnight patient states he had a few bouts of bad thoughts but his overall suicidality is improved.  Back pain at this point is controlled.  He started to optimize with his ambulation.  He still requires a one-to-one sitter for his suicidal watch and legal hold.  Patient is pending discharge to an inpatient psychiatric facility.  He is medically been cleared.    Consultants/Specialty  Psychiatry    Code Status  Full code    Disposition  To be determined    Review of Systems  Review of Systems   Constitutional: Negative.  Negative for fever, malaise/fatigue and weight loss.   HENT: Negative.  Negative for ear pain, nosebleeds and sore throat.    Eyes: Negative.  Negative for pain and discharge.   Respiratory: Negative.  Negative for cough, sputum production and wheezing.    Cardiovascular: Negative.  Negative for chest pain, leg swelling and PND.   Gastrointestinal: Negative.  Negative for abdominal pain, constipation and nausea.   Genitourinary: Negative.  Negative for dysuria.   Musculoskeletal: Negative.  Negative for back pain and myalgias.   Skin: Negative.  Negative for itching.   Neurological: Negative.  Negative for speech change, focal weakness and seizures.   Endo/Heme/Allergies: Negative.  Negative  for polydipsia.   Psychiatric/Behavioral: Negative for depression and substance abuse. The patient has insomnia.    All other systems reviewed and are negative.    Hospital Medicine Daily Progress Note    Date of Service  2/4/2019    Chief Complaint  53 y.o. male admitted 12/28/2018 with suicide ideation    Hospital Course    Patient has been admitted now since the last month and he has had suicidal ideation.  Patient at this point unfortunately continues to be suicidal.  Medications at this point are being actively adjusted.  The patient at this point will need a discharge plan medically at this point he is optimized and stable he does have chronic dysautonomia secondary to uncontrolled diabetes and because of this he is hypotensive.  The patient will need a wheelchair and medication management long-term to keep his blood pressure stable.      Interval Problem Update  Patient this morning is sitting up in the chair looking out at the snowfall.  The patient states that he has had much better thoughts overnight.  I have talked to him in detail yesterday about being optimistic and having positive thoughts.  The patient at this point has minimal discomfort and he says he really does not have any pain.  He uses a TLSO brace for his back he is at this point otherwise medically optimized and cleared for discharge we are anticipating discharge to an inpatient psychiatric facility once accepted.    Consultants/Specialty  Psychiatry    Code Status  Full code    Disposition  To be determined    Review of Systems  Review of Systems   Constitutional: Negative.  Negative for chills, diaphoresis and malaise/fatigue.   HENT: Negative.  Negative for ear discharge and nosebleeds.    Eyes: Negative.  Negative for blurred vision, double vision, pain and discharge.   Respiratory: Negative.  Negative for cough, hemoptysis, sputum production and shortness of breath.    Cardiovascular: Negative.  Negative for chest pain, palpitations and  orthopnea.   Gastrointestinal: Negative.  Negative for constipation, diarrhea, heartburn and vomiting.   Genitourinary: Negative.  Negative for dysuria, frequency and urgency.   Musculoskeletal: Negative.  Negative for back pain, myalgias and neck pain.   Skin: Negative.  Negative for itching and rash.   Neurological: Negative.  Negative for dizziness, speech change, focal weakness and headaches.   Endo/Heme/Allergies: Negative.    Psychiatric/Behavioral: Negative for depression, hallucinations, memory loss, substance abuse and suicidal ideas. The patient has insomnia (with nightmares).    All other systems reviewed and are negative.       Physical Exam  Temp:  [36.2 °C (97.1 °F)-36.5 °C (97.7 °F)] 36.3 °C (97.3 °F)  Pulse:  [53-62] 58  Resp:  [16-17] 17  BP: (118-171)/(47-84) 171/84  SpO2:  [94 %-97 %] 95 %    Physical Exam   Constitutional: He is oriented to person, place, and time. He appears well-developed and well-nourished.   HENT:   Head: Normocephalic and atraumatic.   Right Ear: External ear normal.   Left Ear: External ear normal.   Mouth/Throat: No oropharyngeal exudate.   Eyes: Pupils are equal, round, and reactive to light. Conjunctivae and EOM are normal. Right eye exhibits no discharge. Left eye exhibits no discharge.   Neck: Normal range of motion. Neck supple. No JVD present.   Cardiovascular: Normal rate and regular rhythm.  Exam reveals friction rub.    No murmur heard.  Pulmonary/Chest: Effort normal and breath sounds normal. No stridor. No respiratory distress. He has no rales.   Abdominal: Soft. Bowel sounds are normal. He exhibits no distension. There is no guarding.   Musculoskeletal: Normal range of motion. He exhibits no edema or deformity.   Neurological: He is alert and oriented to person, place, and time. He has normal reflexes. No cranial nerve deficit. He exhibits normal muscle tone.   Skin: Skin is warm and dry. No rash noted. No erythema.   Psychiatric: He has a normal mood and  affect. His speech is normal and behavior is normal. Judgment and thought content normal. Cognition and memory are normal. He does not exhibit a depressed mood. He expresses no suicidal ideation.   Nursing note and vitals reviewed.      Fluids    Intake/Output Summary (Last 24 hours) at 02/04/19 1411  Last data filed at 02/04/19 1359   Gross per 24 hour   Intake              830 ml   Output             1300 ml   Net             -470 ml       Laboratory                        Imaging  DX-HIP-UNILATERAL-WITH PELVIS-1 VIEW RIGHT   Final Result      No radiographic evidence of acute traumatic injury right hip.      DX-CHEST-PORTABLE (1 VIEW)   Final Result      Hypoinflation without other evidence for acute cardiopulmonary disease.      MR-BRAIN-W/O   Final Result      1.  MRI of the brain without contrast within normal limits for age with mild white matter changes.   2.  Chronic appearing sinus disease   3.  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 This does not have an aggressive appearance.      US-EXTREMITY VENOUS LOWER BILAT   Final Result      CT-HAND WITH RIGHT   Final Result      1.  Edema in the dorsum of the forearm and hand without a discrete fluid collection to suggest abscess is identified.      US-CAROTID DOPPLER BILAT   Final Result      DX-LUMBAR SPINE-2 OR 3 VIEWS   Final Result         Stable compression deformity in the L1 vertebral body.      CT-CTA CHEST PULMONARY ARTERY W/ RECONS   Final Result         1. No CT evidence of pulmonary embolism.      2. Old right rib fractures.      3. Cholelithiasis. Enlarged spleen with granulomas.      4. Mild superior endplate compression deformity of L1 could be acute. Correlate with point tenderness. No retropulsion. No malalignment.      CT-CSPINE WITHOUT PLUS RECONS   Final Result         1. No acute fracture from C1 through T3 is visualized.         CT-HEAD W/O   Final Result         1. No acute intracranial  abnormality. No evidence of acute intracranial hemorrhage or mass lesion.               DX-THORACIC SPINE-WITH SWIMMERS VIEW   Final Result      No acute thoracic spine fracture or subluxation identified.      DX-CHEST-PORTABLE (1 VIEW)   Final Result      No acute cardiac or pulmonary abnormality is noted.           Assessment/Plan  * Orthostatic hypotension- (present on admission)   Assessment & Plan    Blood pressure at this point stable 140/70.  Patient has not experienced orthostatics in several days.     Suicidal ideation   Assessment & Plan    Most part resolved, he occasionally has some bad thoughts.  His overall judgment as well as insight is much better.  Remains at this point on suicidal legal hold per psychiatry.  Medically has been cleared and we anticipate discharge to an inpatient psychiatric facility.     Fall from ground level   Assessment & Plan    Resolved     Lumbar compression fracture (HCC)   Assessment & Plan    Patient has TLSO brace in place.  Continue with pain management as indicated.     Psychogenic nonepileptic seizure- (present on admission)   Assessment & Plan    Negative EEG, perceived to be pseudoseizures no treatment indicated.     Vitamin B12 deficiency- (present on admission)   Assessment & Plan    Continue with monthly IM B12 injections     Syncope- (present on admission)   Assessment & Plan    Cause most likely by diabetic dysautonomia.  At this point patient's blood pressure has stabilized most recently 140/70.  Patient remains on Midodrin.  We are giving this 10 mg daily.  Negative workup for syncope with MRI, carotids, echo.               Debility- (present on admission)   Assessment & Plan    As tolerated continue with therapy modalities.     Acute respiratory failure with hypoxia (HCC)   Assessment & Plan    Resolved     Chronic diastolic heart failure (HCC)- (present on admission)   Assessment & Plan    Medication optimized currently no exacerbation noted.     Bilateral  leg edema- (present on admission)   Assessment & Plan    At this point leg edema is resolving.     Venous stasis dermatitis of both lower extremities- (present on admission)   Assessment & Plan    Treated with sequentials.     Paroxysmal A-fib (HCC)- (present on admission)   Assessment & Plan    Patient's atrial fibrillation is continued to be controlled with amiodarone.  Monitor for amiodarone side effects.     Essential hypertension- (present on admission)   Assessment & Plan    Blood pressure management optimization continued     Mixed hyperlipidemia- (present on admission)   Assessment & Plan    Low-fat low-cholesterol diet and statin.  Lab Results   Component Value Date/Time    CHOLSTRLTOT 104 11/22/2018 12:07 AM    LDL 60 11/22/2018 12:07 AM    HDL 27 (A) 11/22/2018 12:07 AM    TRIGLYCERIDE 87 11/22/2018 12:07 AM                    VTE prophylaxis: Heparin         Physical Exam  Temp:  [36.2 °C (97.1 °F)-36.5 °C (97.7 °F)] 36.3 °C (97.3 °F)  Pulse:  [53-62] 58  Resp:  [16-17] 17  BP: (118-171)/(47-84) 171/84  SpO2:  [94 %-97 %] 95 %    Physical Exam   Constitutional: He is oriented to person, place, and time. He appears well-developed and well-nourished.   HENT:   Head: Normocephalic and atraumatic.   Right Ear: External ear normal.   Left Ear: External ear normal.   Eyes: Pupils are equal, round, and reactive to light. Conjunctivae and EOM are normal. No scleral icterus.   Neck: Normal range of motion. Neck supple. No tracheal deviation present. No thyromegaly present.   Cardiovascular: Normal rate and regular rhythm.  Exam reveals friction rub.    Pulmonary/Chest: Effort normal and breath sounds normal. No respiratory distress. He has no rales.   Abdominal: Soft. Bowel sounds are normal. He exhibits no distension. There is no tenderness. There is no guarding.   Genitourinary: Rectal exam shows guaiac negative stool. No penile tenderness.   Musculoskeletal: Normal range of motion.   Lymphadenopathy:     He  has no cervical adenopathy.   Neurological: He is alert and oriented to person, place, and time. He has normal reflexes.   Skin: Skin is warm and dry. No rash noted. No erythema.   Psychiatric: He has a normal mood and affect. His speech is normal and behavior is normal. Cognition and memory are normal. He does not exhibit a depressed mood. He expresses no suicidal ideation.   Nursing note and vitals reviewed.      Fluids    Intake/Output Summary (Last 24 hours) at 02/04/19 1411  Last data filed at 02/04/19 1359   Gross per 24 hour   Intake              830 ml   Output             1300 ml   Net             -470 ml       Laboratory                        Imaging  DX-HIP-UNILATERAL-WITH PELVIS-1 VIEW RIGHT   Final Result      No radiographic evidence of acute traumatic injury right hip.      DX-CHEST-PORTABLE (1 VIEW)   Final Result      Hypoinflation without other evidence for acute cardiopulmonary disease.      MR-BRAIN-W/O   Final Result      1.  MRI of the brain without contrast within normal limits for age with mild white matter changes.   2.  Chronic appearing sinus disease   3.  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 This does not have an aggressive appearance.      US-EXTREMITY VENOUS LOWER BILAT   Final Result      CT-HAND WITH RIGHT   Final Result      1.  Edema in the dorsum of the forearm and hand without a discrete fluid collection to suggest abscess is identified.      US-CAROTID DOPPLER BILAT   Final Result      DX-LUMBAR SPINE-2 OR 3 VIEWS   Final Result         Stable compression deformity in the L1 vertebral body.      CT-CTA CHEST PULMONARY ARTERY W/ RECONS   Final Result         1. No CT evidence of pulmonary embolism.      2. Old right rib fractures.      3. Cholelithiasis. Enlarged spleen with granulomas.      4. Mild superior endplate compression deformity of L1 could be acute. Correlate with point tenderness. No retropulsion. No malalignment.       CT-CSPINE WITHOUT PLUS RECONS   Final Result         1. No acute fracture from C1 through T3 is visualized.         CT-HEAD W/O   Final Result         1. No acute intracranial abnormality. No evidence of acute intracranial hemorrhage or mass lesion.               DX-THORACIC SPINE-WITH SWIMMERS VIEW   Final Result      No acute thoracic spine fracture or subluxation identified.      DX-CHEST-PORTABLE (1 VIEW)   Final Result      No acute cardiac or pulmonary abnormality is noted.           Assessment/Plan  * Orthostatic hypotension- (present on admission)   Assessment & Plan    Blood pressure at this point stable 140/70.  Patient has not experienced orthostatics in several days.     Suicidal ideation   Assessment & Plan    Most part resolved, he occasionally has some bad thoughts.  His overall judgment as well as insight is much better.  Remains at this point on suicidal legal hold per psychiatry.  Medically has been cleared and we anticipate discharge to an inpatient psychiatric facility.     Fall from ground level   Assessment & Plan    Resolved     Lumbar compression fracture (HCC)   Assessment & Plan    Patient has TLSO brace in place.  Continue with pain management as indicated.     Psychogenic nonepileptic seizure- (present on admission)   Assessment & Plan    Negative EEG, perceived to be pseudoseizures no treatment indicated.     Vitamin B12 deficiency- (present on admission)   Assessment & Plan    Continue with monthly IM B12 injections     Syncope- (present on admission)   Assessment & Plan    Cause most likely by diabetic dysautonomia.  At this point patient's blood pressure has stabilized most recently 140/70.  Patient remains on Midodrin.  We are giving this 10 mg daily.  Negative workup for syncope with MRI, carotids, echo.               Debility- (present on admission)   Assessment & Plan    As tolerated continue with therapy modalities.     Acute respiratory failure with hypoxia (HCC)    Assessment & Plan    Resolved     Chronic diastolic heart failure (HCC)- (present on admission)   Assessment & Plan    Medication optimized currently no exacerbation noted.     Bilateral leg edema- (present on admission)   Assessment & Plan    At this point leg edema is resolving.     Venous stasis dermatitis of both lower extremities- (present on admission)   Assessment & Plan    Treated with sequentials.     Paroxysmal A-fib (HCC)- (present on admission)   Assessment & Plan    Patient's atrial fibrillation is continued to be controlled with amiodarone.  Monitor for amiodarone side effects.     Essential hypertension- (present on admission)   Assessment & Plan    Blood pressure management optimization continued     Mixed hyperlipidemia- (present on admission)   Assessment & Plan    Low-fat low-cholesterol diet and statin.  Lab Results   Component Value Date/Time    CHOLSTRLTOT 104 11/22/2018 12:07 AM    LDL 60 11/22/2018 12:07 AM    HDL 27 (A) 11/22/2018 12:07 AM    TRIGLYCERIDE 87 11/22/2018 12:07 AM                    VTE prophylaxis: Heparin

## 2019-02-05 LAB
ANION GAP SERPL CALC-SCNC: 8 MMOL/L (ref 0–11.9)
BASOPHILS # BLD AUTO: 0.8 % (ref 0–1.8)
BASOPHILS # BLD: 0.05 K/UL (ref 0–0.12)
BUN SERPL-MCNC: 22 MG/DL (ref 8–22)
CALCIUM SERPL-MCNC: 8.7 MG/DL (ref 8.5–10.5)
CHLORIDE SERPL-SCNC: 105 MMOL/L (ref 96–112)
CO2 SERPL-SCNC: 22 MMOL/L (ref 20–33)
CREAT SERPL-MCNC: 0.74 MG/DL (ref 0.5–1.4)
EOSINOPHIL # BLD AUTO: 0.27 K/UL (ref 0–0.51)
EOSINOPHIL NFR BLD: 4.4 % (ref 0–6.9)
ERYTHROCYTE [DISTWIDTH] IN BLOOD BY AUTOMATED COUNT: 48.8 FL (ref 35.9–50)
GLUCOSE SERPL-MCNC: 90 MG/DL (ref 65–99)
HCT VFR BLD AUTO: 44.3 % (ref 42–52)
HGB BLD-MCNC: 13.9 G/DL (ref 14–18)
IMM GRANULOCYTES # BLD AUTO: 0.02 K/UL (ref 0–0.11)
IMM GRANULOCYTES NFR BLD AUTO: 0.3 % (ref 0–0.9)
LYMPHOCYTES # BLD AUTO: 1.69 K/UL (ref 1–4.8)
LYMPHOCYTES NFR BLD: 27.5 % (ref 22–41)
MCH RBC QN AUTO: 28.4 PG (ref 27–33)
MCHC RBC AUTO-ENTMCNC: 31.4 G/DL (ref 33.7–35.3)
MCV RBC AUTO: 90.6 FL (ref 81.4–97.8)
MONOCYTES # BLD AUTO: 0.4 K/UL (ref 0–0.85)
MONOCYTES NFR BLD AUTO: 6.5 % (ref 0–13.4)
NEUTROPHILS # BLD AUTO: 3.71 K/UL (ref 1.82–7.42)
NEUTROPHILS NFR BLD: 60.5 % (ref 44–72)
NRBC # BLD AUTO: 0 K/UL
NRBC BLD-RTO: 0 /100 WBC
PLATELET # BLD AUTO: 187 K/UL (ref 164–446)
PMV BLD AUTO: 10.6 FL (ref 9–12.9)
POTASSIUM SERPL-SCNC: 4.2 MMOL/L (ref 3.6–5.5)
RBC # BLD AUTO: 4.89 M/UL (ref 4.7–6.1)
SODIUM SERPL-SCNC: 135 MMOL/L (ref 135–145)
WBC # BLD AUTO: 6.1 K/UL (ref 4.8–10.8)

## 2019-02-05 PROCEDURE — G0378 HOSPITAL OBSERVATION PER HR: HCPCS

## 2019-02-05 PROCEDURE — 97535 SELF CARE MNGMENT TRAINING: CPT

## 2019-02-05 PROCEDURE — A9270 NON-COVERED ITEM OR SERVICE: HCPCS | Performed by: INTERNAL MEDICINE

## 2019-02-05 PROCEDURE — 700102 HCHG RX REV CODE 250 W/ 637 OVERRIDE(OP): Performed by: PSYCHIATRY & NEUROLOGY

## 2019-02-05 PROCEDURE — A9270 NON-COVERED ITEM OR SERVICE: HCPCS | Performed by: HOSPITALIST

## 2019-02-05 PROCEDURE — 85025 COMPLETE CBC W/AUTO DIFF WBC: CPT

## 2019-02-05 PROCEDURE — 700102 HCHG RX REV CODE 250 W/ 637 OVERRIDE(OP): Performed by: HOSPITALIST

## 2019-02-05 PROCEDURE — 700102 HCHG RX REV CODE 250 W/ 637 OVERRIDE(OP): Performed by: FAMILY MEDICINE

## 2019-02-05 PROCEDURE — 36415 COLL VENOUS BLD VENIPUNCTURE: CPT

## 2019-02-05 PROCEDURE — 700102 HCHG RX REV CODE 250 W/ 637 OVERRIDE(OP): Performed by: INTERNAL MEDICINE

## 2019-02-05 PROCEDURE — A9270 NON-COVERED ITEM OR SERVICE: HCPCS | Performed by: FAMILY MEDICINE

## 2019-02-05 PROCEDURE — 80048 BASIC METABOLIC PNL TOTAL CA: CPT

## 2019-02-05 PROCEDURE — A9270 NON-COVERED ITEM OR SERVICE: HCPCS | Performed by: PSYCHIATRY & NEUROLOGY

## 2019-02-05 PROCEDURE — 99225 PR SUBSEQUENT OBSERVATION CARE,LEVEL II: CPT | Performed by: FAMILY MEDICINE

## 2019-02-05 RX ORDER — PRAVASTATIN SODIUM 20 MG
20 TABLET ORAL DAILY
Status: DISCONTINUED | OUTPATIENT
Start: 2019-02-05 | End: 2019-02-27 | Stop reason: HOSPADM

## 2019-02-05 RX ORDER — CHOLECALCIFEROL (VITAMIN D3) 125 MCG
1000 CAPSULE ORAL DAILY
Status: DISCONTINUED | OUTPATIENT
Start: 2019-02-05 | End: 2019-02-27 | Stop reason: HOSPADM

## 2019-02-05 RX ADMIN — FLUOXETINE HYDROCHLORIDE 60 MG: 20 CAPSULE ORAL at 05:51

## 2019-02-05 RX ADMIN — FLUDROCORTISONE ACETATE 0.3 MG: 0.1 TABLET ORAL at 05:51

## 2019-02-05 RX ADMIN — Medication 400 MG: at 05:51

## 2019-02-05 RX ADMIN — OXYCODONE HYDROCHLORIDE 5 MG: 5 TABLET ORAL at 08:05

## 2019-02-05 RX ADMIN — CYANOCOBALAMIN TAB 500 MCG 1000 MCG: 500 TAB at 15:42

## 2019-02-05 RX ADMIN — SODIUM CHLORIDE TAB 1 GM 1 G: 1 TAB at 08:05

## 2019-02-05 RX ADMIN — METOCLOPRAMIDE HYDROCHLORIDE 10 MG: 10 TABLET ORAL at 17:34

## 2019-02-05 RX ADMIN — OXYCODONE HYDROCHLORIDE AND ACETAMINOPHEN 500 MG: 500 TABLET ORAL at 05:51

## 2019-02-05 RX ADMIN — METOCLOPRAMIDE HYDROCHLORIDE 10 MG: 10 TABLET ORAL at 12:29

## 2019-02-05 RX ADMIN — ACETAMINOPHEN 650 MG: 325 TABLET, FILM COATED ORAL at 08:05

## 2019-02-05 RX ADMIN — RISPERIDONE 0.5 MG: 0.5 TABLET ORAL at 18:00

## 2019-02-05 RX ADMIN — PRAVASTATIN SODIUM 20 MG: 20 TABLET ORAL at 17:34

## 2019-02-05 RX ADMIN — MULTIPLE VITAMINS W/ MINERALS TAB 1 TABLET: TAB at 05:51

## 2019-02-05 RX ADMIN — MIDODRINE HYDROCHLORIDE 10 MG: 5 TABLET ORAL at 12:29

## 2019-02-05 RX ADMIN — SODIUM CHLORIDE TAB 1 GM 1 G: 1 TAB at 17:34

## 2019-02-05 RX ADMIN — PYRIDOSTIGMINE BROMIDE 90 MG: 60 TABLET ORAL at 17:34

## 2019-02-05 RX ADMIN — SODIUM CHLORIDE TAB 1 GM 1 G: 1 TAB at 12:29

## 2019-02-05 RX ADMIN — METOCLOPRAMIDE HYDROCHLORIDE 10 MG: 10 TABLET ORAL at 08:05

## 2019-02-05 RX ADMIN — ACETAMINOPHEN 650 MG: 325 TABLET, FILM COATED ORAL at 17:34

## 2019-02-05 RX ADMIN — OXYCODONE HYDROCHLORIDE 5 MG: 5 TABLET ORAL at 17:34

## 2019-02-05 RX ADMIN — MIDODRINE HYDROCHLORIDE 10 MG: 5 TABLET ORAL at 05:50

## 2019-02-05 RX ADMIN — AMIODARONE HYDROCHLORIDE 50 MG: 200 TABLET ORAL at 05:51

## 2019-02-05 RX ADMIN — PYRIDOSTIGMINE BROMIDE 90 MG: 60 TABLET ORAL at 05:50

## 2019-02-05 RX ADMIN — MIDODRINE HYDROCHLORIDE 10 MG: 5 TABLET ORAL at 17:33

## 2019-02-05 RX ADMIN — ASPIRIN 81 MG 81 MG: 81 TABLET ORAL at 05:51

## 2019-02-05 RX ADMIN — PYRIDOSTIGMINE BROMIDE 90 MG: 60 TABLET ORAL at 12:29

## 2019-02-05 ASSESSMENT — ENCOUNTER SYMPTOMS
SORE THROAT: 0
CHILLS: 0
COUGH: 0
HEARTBURN: 0
NAUSEA: 0
VOMITING: 0
FOCAL WEAKNESS: 0
SHORTNESS OF BREATH: 0
DIZZINESS: 1
NERVOUS/ANXIOUS: 1
WEAKNESS: 1
WHEEZING: 0
BLURRED VISION: 0
FEVER: 0
DIARRHEA: 0
BACK PAIN: 0
HEADACHES: 0
ABDOMINAL PAIN: 0

## 2019-02-05 ASSESSMENT — COGNITIVE AND FUNCTIONAL STATUS - GENERAL
SUGGESTED CMS G CODE MODIFIER DAILY ACTIVITY: CJ
HELP NEEDED FOR BATHING: A LITTLE
TOILETING: A LITTLE
PERSONAL GROOMING: A LITTLE
DRESSING REGULAR LOWER BODY CLOTHING: A LITTLE
DAILY ACTIVITIY SCORE: 20

## 2019-02-05 NOTE — DISCHARGE PLANNING
Agency/Facility Name: [facility]  Spoke To: Lena   Outcome: Patient is 17 patients back on the list before being reviewed.    JAMES Oropeza notified.

## 2019-02-05 NOTE — CARE PLAN
Problem: Safety  Goal: Will remain free from injury  Outcome: PROGRESSING AS EXPECTED  Pt with 1 to 1 sitter for suicide precautions. Pt has treaded socks on. His bed is in the lowest position, and he uses a TLSO brace when out of bed    Problem: Mobility  Goal: Risk for activity intolerance will decrease  Outcome: PROGRESSING AS EXPECTED  Pt encouraged to sit up in chair with TLSO brace and ambulate for exercise. Pt sat up in bed and ambulated in the hallway with sitter.

## 2019-02-05 NOTE — CARE PLAN
Problem: Pain Management  Goal: Pain level will decrease to patient's comfort goal  Outcome: PROGRESSING AS EXPECTED  Patient complains of head an back pain. PRN medications given for pain. Pt states feeling relief from pain.     Problem: Safety  Goal: Will remain free from injury  Outcome: PROGRESSING AS EXPECTED  Pt with one to one sitter at bedside. Pt awake and alert. Up in chair for meals. TLSO brace in use when out of bed.

## 2019-02-05 NOTE — PROGRESS NOTES
Hospital Medicine Daily Progress Note    Date of Service  2/5/2019    Chief Complaint  53 y.o. male admitted 12/28/2018 with Syncope    Hospital Course  Admitted with Syncope, noted to have L1 compression fracture, work up negative except for orthostatic hypotension.  He then started complaining of suicidal thoughts.    Interval Problem Update  Orthostatic hypotension - BP seems better   Suicidal - persistent thoughts  Hypertension - BP coming up  L1 fracture - pain controlled    Consultants/Specialty  Psychiatry  Neurosurgery signed off    Code Status  Full    Disposition  Legal hold    Review of Systems  Review of Systems   Constitutional: Positive for malaise/fatigue. Negative for chills and fever.   HENT: Negative for hearing loss and sore throat.    Eyes: Negative for blurred vision.   Respiratory: Negative for cough, shortness of breath and wheezing.    Cardiovascular: Positive for leg swelling. Negative for chest pain.   Gastrointestinal: Negative for abdominal pain, diarrhea, heartburn, nausea and vomiting.   Genitourinary: Negative for dysuria.   Musculoskeletal: Negative for back pain.   Skin: Negative for rash.   Neurological: Positive for dizziness and weakness. Negative for focal weakness and headaches.   Psychiatric/Behavioral: Positive for suicidal ideas. The patient is nervous/anxious.         Physical Exam  Temp:  [36.1 °C (97 °F)-36.4 °C (97.6 °F)] 36.4 °C (97.6 °F)  Pulse:  [56-65] 60  Resp:  [16-18] 18  BP: (101-191)/() 137/102  SpO2:  [91 %-98 %] 94 %    Physical Exam   Constitutional: He is oriented to person, place, and time. He appears well-developed.   HENT:   Head: Normocephalic and atraumatic.   Eyes: Pupils are equal, round, and reactive to light. Conjunctivae are normal.   Neck: No tracheal deviation present. No thyromegaly present.   Cardiovascular: Normal rate and regular rhythm.    Pulmonary/Chest: Effort normal and breath sounds normal.   Abdominal: Soft. Bowel sounds are  normal. He exhibits no distension. There is no tenderness.   Musculoskeletal: He exhibits edema.   Lymphadenopathy:     He has no cervical adenopathy.   Neurological: He is alert and oriented to person, place, and time. No cranial nerve deficit.   MMT 5/5   Skin:   Venous stasis both legs   Nursing note and vitals reviewed.      Fluids    Intake/Output Summary (Last 24 hours) at 02/05/19 1458  Last data filed at 02/05/19 1318   Gross per 24 hour   Intake             1398 ml   Output             1300 ml   Net               98 ml       Laboratory  Recent Labs      02/05/19   1035   WBC  6.1   RBC  4.89   HEMOGLOBIN  13.9*   HEMATOCRIT  44.3   MCV  90.6   MCH  28.4   MCHC  31.4*   RDW  48.8   PLATELETCT  187   MPV  10.6     Recent Labs      02/05/19   1035   SODIUM  135   POTASSIUM  4.2   CHLORIDE  105   CO2  22   GLUCOSE  90   BUN  22   CREATININE  0.74   CALCIUM  8.7                   Imaging  DX-HIP-UNILATERAL-WITH PELVIS-1 VIEW RIGHT   Final Result      No radiographic evidence of acute traumatic injury right hip.      DX-CHEST-PORTABLE (1 VIEW)   Final Result      Hypoinflation without other evidence for acute cardiopulmonary disease.      MR-BRAIN-W/O   Final Result      1.  MRI of the brain without contrast within normal limits for age with mild white matter changes.   2.  Chronic appearing sinus disease   3.  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 This does not have an aggressive appearance.      US-EXTREMITY VENOUS LOWER BILAT   Final Result      CT-HAND WITH RIGHT   Final Result      1.  Edema in the dorsum of the forearm and hand without a discrete fluid collection to suggest abscess is identified.      US-CAROTID DOPPLER BILAT   Final Result      DX-LUMBAR SPINE-2 OR 3 VIEWS   Final Result         Stable compression deformity in the L1 vertebral body.      CT-CTA CHEST PULMONARY ARTERY W/ RECONS   Final Result         1. No CT evidence of pulmonary  embolism.      2. Old right rib fractures.      3. Cholelithiasis. Enlarged spleen with granulomas.      4. Mild superior endplate compression deformity of L1 could be acute. Correlate with point tenderness. No retropulsion. No malalignment.      CT-CSPINE WITHOUT PLUS RECONS   Final Result         1. No acute fracture from C1 through T3 is visualized.         CT-HEAD W/O   Final Result         1. No acute intracranial abnormality. No evidence of acute intracranial hemorrhage or mass lesion.               DX-THORACIC SPINE-WITH SWIMMERS VIEW   Final Result      No acute thoracic spine fracture or subluxation identified.      DX-CHEST-PORTABLE (1 VIEW)   Final Result      No acute cardiac or pulmonary abnormality is noted.           Assessment/Plan  * Orthostatic hypotension- (present on admission)   Assessment & Plan    Florinef, Midodrine     Suicidal ideation   Assessment & Plan    Prozac, Risperdal  1:1 sitter     Fall from ground level- (present on admission)   Assessment & Plan    PT/OT     Syncope- (present on admission)   Assessment & Plan    Autonomic dysfunction?       Debility- (present on admission)   Assessment & Plan    PT/OT     Acute respiratory failure with hypoxia (HCC)- (present on admission)   Assessment & Plan    Resolved     Lumbar compression fracture (HCC)- (present on admission)   Assessment & Plan    TLSO brace   pain control     Psychogenic nonepileptic seizure- (present on admission)   Assessment & Plan    no treatment indicated.     Chronic diastolic heart failure (HCC)- (present on admission)   Assessment & Plan    Off Lisinopril and Lasix     Venous stasis dermatitis of both lower extremities- (present on admission)   Assessment & Plan    Compression     Bilateral leg edema- (present on admission)   Assessment & Plan    compression     Vitamin B12 deficiency- (present on admission)   Assessment & Plan    Start oral B12     Paroxysmal A-fib (HCC)- (present on admission)   Assessment &  Plan    Amiodarone, ASA  [id number] - 3       Essential hypertension- (present on admission)   Assessment & Plan    Off Lisinopril     Mixed hyperlipidemia- (present on admission)   Assessment & Plan    Pravastatin            Type 2 diabetes mellitus, without long-term current use of insulin, with peripheral neuropathy (HCC)- (present on admission)   Assessment & Plan    Diet controlled     Hyponatremia- (present on admission)   Assessment & Plan    Salt tabs          VTE prophylaxis: SCDs

## 2019-02-05 NOTE — DISCHARGE PLANNING
Legal Hold     Referral: Legal Hold Court     Intervention: Pt presented for legal hold meeting with  to discuss legal options of contesting hold and meeting with the court doctors tomorrow afternoon, or not contesting legal hold and continuing the hold for one week.  advised that pt's legal hold will be be continued for one week (2/14/19).       Plan: Pt's legal hold has been continued for one week. Pt awaiting transfer to an in patient psych facility.

## 2019-02-05 NOTE — THERAPY
"Physical Therapy Treatment completed.   Bed Mobility:  Supine to Sit: Supervised (HOB elevated)  Transfers: Sit to Stand: Supervised  Gait: Level Of Assist: Supervised with Front-Wheel Walker       Plan of Care: Will benefit from Physical Therapy 1 times per week  Discharge Recommendations: Equipment: 4-Wheel Walker and Wheelchair. Recommend outpatient or home health transitional care services for continued physical therapy services.    See \"Rehab Therapy-Acute\" Patient Summary Report for complete documentation.     Pt is progressing gradually with functional mobility and activity tolerance. Pt was able to demonstrate SPV leve with functional mobility with FWW use. Pt continues to verbilize symptoms of lightheadedness when ambulation and requires a rest break every 50 to 70 ft of ambulation. Pt was able to improve tolerance this session and increase distance of ambulation. Pt continues to require cues for saftey awarness with spinal precations and appropriate don/doffing of LSO, however, pt does not present with any carry over or reception of PT education. Pt will continue to benefit from skilled PT while in house to improve activity tolerance. With current functional mobility pt may be able to d/c home with recommendation for HH therapy if he has 24/7 supervision support. Will continue to follow.   "

## 2019-02-05 NOTE — THERAPY
"Occupational Therapy Treatment completed with focus on ADLs, ADL transfers and patient education.  Functional Status:  Pt seen for OT session. Supine>sit with HOB elevated with SPV. Don/doff compression stockings with min A and socks with SPV. Reports GF can assist PRN. Donned LSO over head with Min A for straps. Continued education on spinal precautions, but continues to req v/cs. Completed sit>stand with SBA and FWW. Ambulated to toilet with SPV and  w/o dizziness and completed toilet txf with SBA, req v/cs to maintain spinal precautions. Ambulated to sink and completed oral care with SPV w/o c/o dizziness/lightheadedness. Ambulated to chair and stand>sit with SBA. Left seated in chair with sitter in room.  Plan of Care: Not actively following; D/C needs only.  Discharge Recommendations:  Equipment Will Continue to Assess for Equipment Needs. Post-acute therapy: Recommend home health or outpatient transitional care services for continued occupational therapy services.      See \"Rehab Therapy-Acute\" Patient Summary Report for complete documentation.     Pt seen for OT session. Demo'd progress with strength, activity tolerance, and orthostatic hypotension. Completed ADLs/txfs with SBA-SPV, and reported has been taking showers with SPV only with NRSG. Still req v/cs to maintain spinal precautions, but continues to be noncompliant. Will continue to follow for d/c recommendations d/t complicated d/c. Also, recommend home health or outpatient transitional care services for continued occupational therapy services.      "

## 2019-02-05 NOTE — PROGRESS NOTES
Report received from Night RN. Patient awake and alert. Pt has one to one sitter at bedside. Safety precautions explained to him. No complaints of pain. Cooperative with care.

## 2019-02-05 NOTE — PROGRESS NOTES
Received report from Night RN. Pt has a 1:1 sitter at bedside. Pt awake and alert.   Pt orthostatic positive. Resting , then 140 when standing.

## 2019-02-06 PROCEDURE — 700102 HCHG RX REV CODE 250 W/ 637 OVERRIDE(OP): Performed by: HOSPITALIST

## 2019-02-06 PROCEDURE — A9270 NON-COVERED ITEM OR SERVICE: HCPCS | Performed by: FAMILY MEDICINE

## 2019-02-06 PROCEDURE — 99225 PR SUBSEQUENT OBSERVATION CARE,LEVEL II: CPT | Performed by: FAMILY MEDICINE

## 2019-02-06 PROCEDURE — 700102 HCHG RX REV CODE 250 W/ 637 OVERRIDE(OP): Performed by: PSYCHIATRY & NEUROLOGY

## 2019-02-06 PROCEDURE — A9270 NON-COVERED ITEM OR SERVICE: HCPCS | Performed by: HOSPITALIST

## 2019-02-06 PROCEDURE — A9270 NON-COVERED ITEM OR SERVICE: HCPCS | Performed by: PSYCHIATRY & NEUROLOGY

## 2019-02-06 PROCEDURE — 700111 HCHG RX REV CODE 636 W/ 250 OVERRIDE (IP): Performed by: FAMILY MEDICINE

## 2019-02-06 PROCEDURE — A9270 NON-COVERED ITEM OR SERVICE: HCPCS | Performed by: INTERNAL MEDICINE

## 2019-02-06 PROCEDURE — 700102 HCHG RX REV CODE 250 W/ 637 OVERRIDE(OP): Performed by: INTERNAL MEDICINE

## 2019-02-06 PROCEDURE — 700102 HCHG RX REV CODE 250 W/ 637 OVERRIDE(OP): Performed by: FAMILY MEDICINE

## 2019-02-06 PROCEDURE — G0378 HOSPITAL OBSERVATION PER HR: HCPCS

## 2019-02-06 RX ORDER — RISPERIDONE 0.5 MG/1
1 TABLET ORAL EVERY EVENING
Status: DISCONTINUED | OUTPATIENT
Start: 2019-02-06 | End: 2019-02-08

## 2019-02-06 RX ADMIN — AMIODARONE HYDROCHLORIDE 50 MG: 200 TABLET ORAL at 05:22

## 2019-02-06 RX ADMIN — ENOXAPARIN SODIUM 40 MG: 100 INJECTION SUBCUTANEOUS at 14:34

## 2019-02-06 RX ADMIN — MIDODRINE HYDROCHLORIDE 10 MG: 5 TABLET ORAL at 11:24

## 2019-02-06 RX ADMIN — SODIUM CHLORIDE TAB 1 GM 1 G: 1 TAB at 08:34

## 2019-02-06 RX ADMIN — METOCLOPRAMIDE HYDROCHLORIDE 10 MG: 10 TABLET ORAL at 05:26

## 2019-02-06 RX ADMIN — PYRIDOSTIGMINE BROMIDE 90 MG: 60 TABLET ORAL at 17:30

## 2019-02-06 RX ADMIN — SODIUM CHLORIDE TAB 1 GM 1 G: 1 TAB at 11:24

## 2019-02-06 RX ADMIN — METOCLOPRAMIDE HYDROCHLORIDE 10 MG: 10 TABLET ORAL at 11:24

## 2019-02-06 RX ADMIN — FLUOXETINE HYDROCHLORIDE 60 MG: 20 CAPSULE ORAL at 05:23

## 2019-02-06 RX ADMIN — OXYCODONE HYDROCHLORIDE AND ACETAMINOPHEN 500 MG: 500 TABLET ORAL at 05:23

## 2019-02-06 RX ADMIN — OXYCODONE HYDROCHLORIDE 5 MG: 5 TABLET ORAL at 08:34

## 2019-02-06 RX ADMIN — FLUDROCORTISONE ACETATE 0.3 MG: 0.1 TABLET ORAL at 05:22

## 2019-02-06 RX ADMIN — ACETAMINOPHEN 650 MG: 325 TABLET, FILM COATED ORAL at 11:27

## 2019-02-06 RX ADMIN — MIDODRINE HYDROCHLORIDE 10 MG: 5 TABLET ORAL at 17:31

## 2019-02-06 RX ADMIN — PYRIDOSTIGMINE BROMIDE 90 MG: 60 TABLET ORAL at 11:24

## 2019-02-06 RX ADMIN — CYANOCOBALAMIN TAB 500 MCG 1000 MCG: 500 TAB at 05:22

## 2019-02-06 RX ADMIN — MIDODRINE HYDROCHLORIDE 10 MG: 5 TABLET ORAL at 05:23

## 2019-02-06 RX ADMIN — PYRIDOSTIGMINE BROMIDE 90 MG: 60 TABLET ORAL at 05:21

## 2019-02-06 RX ADMIN — MULTIPLE VITAMINS W/ MINERALS TAB 1 TABLET: TAB at 05:29

## 2019-02-06 RX ADMIN — PRAVASTATIN SODIUM 20 MG: 20 TABLET ORAL at 17:31

## 2019-02-06 RX ADMIN — OXYCODONE HYDROCHLORIDE 5 MG: 5 TABLET ORAL at 05:23

## 2019-02-06 RX ADMIN — Medication 400 MG: at 05:23

## 2019-02-06 RX ADMIN — METOCLOPRAMIDE HYDROCHLORIDE 10 MG: 10 TABLET ORAL at 17:31

## 2019-02-06 RX ADMIN — RISPERIDONE 1 MG: 0.5 TABLET ORAL at 18:00

## 2019-02-06 RX ADMIN — ASPIRIN 81 MG 81 MG: 81 TABLET ORAL at 05:23

## 2019-02-06 RX ADMIN — SODIUM CHLORIDE TAB 1 GM 1 G: 1 TAB at 17:31

## 2019-02-06 ASSESSMENT — ENCOUNTER SYMPTOMS
FEVER: 0
BLURRED VISION: 0
FOCAL WEAKNESS: 0
NAUSEA: 0
HEADACHES: 0
DIZZINESS: 1
ABDOMINAL PAIN: 0
SHORTNESS OF BREATH: 0
BACK PAIN: 0
WHEEZING: 0
WEAKNESS: 1
SORE THROAT: 0
CHILLS: 0
NERVOUS/ANXIOUS: 1
VOMITING: 0
HEARTBURN: 0
COUGH: 0
DIARRHEA: 0

## 2019-02-06 NOTE — PROGRESS NOTES
Report received. Assumed care at 0700, assessment complete. Pt is A&O x 4. 8/10 pain at this time in back at this time. Pt refusing intervention until his oxycodone is due. Patient instructed on the plan of care for the day. Pt on legal hold for SI. Safety sitter at bedside. Safety room assessment complete. Bed in lowest position. Bed alarm on. Call light within reach. Patient provided RN and CNA extension.

## 2019-02-06 NOTE — PROGRESS NOTES
Received handoff report from ELISA Sharma and assumed pt care at 1900.  Pt resting in bed comfortably. Patient and RN discussed plan of care and questions were answered. Bed in lowest and locked position and bed alarm in place. Call light and personal belongings within reach. 1:1 sitter at bedside for legal hold - SI.

## 2019-02-06 NOTE — CARE PLAN
Problem: Safety  Goal: Will remain free from injury  Outcome: PROGRESSING AS EXPECTED  1:1 sitter at bedside for legal hold - SI.     Problem: Bowel/Gastric:  Goal: Will not experience complications related to bowel motility  Outcome: PROGRESSING SLOWER THAN EXPECTED  Pt experiencing multiple loose bowel movements.

## 2019-02-06 NOTE — PROGRESS NOTES
Pt awake and alert during the shift. One on one sitter used for suicide prevention. Pt able to have his belongings in the room. He is a one person assistance to the chair and bedside commode. He was started on B vitamins and cholesterol medications.

## 2019-02-06 NOTE — PROGRESS NOTES
Hospital Medicine Daily Progress Note    Date of Service  2/6/2019    Chief Complaint  53 y.o. male admitted 12/28/2018 with Syncope    Hospital Course  Admitted with Syncope, noted to have L1 compression fracture, work up negative except for orthostatic hypotension.  He then started complaining of suicidal thoughts.    Interval Problem Update  Orthostatic hypotension - BP better   Suicidal - feels that his thoughts are 50% reduced, his mood is also up  Hypertension - controlled  L1 fracture - pain controlled    Consultants/Specialty  Psychiatry  Neurosurgery signed off    Code Status  Full    Disposition  Legal hold    Review of Systems  Review of Systems   Constitutional: Positive for malaise/fatigue. Negative for chills and fever.   HENT: Negative for hearing loss and sore throat.    Eyes: Negative for blurred vision.   Respiratory: Negative for cough, shortness of breath and wheezing.    Cardiovascular: Positive for leg swelling. Negative for chest pain.   Gastrointestinal: Negative for abdominal pain, diarrhea, heartburn, nausea and vomiting.   Genitourinary: Negative for dysuria.   Musculoskeletal: Negative for back pain.   Skin: Negative for rash.   Neurological: Positive for dizziness and weakness. Negative for focal weakness and headaches.   Psychiatric/Behavioral: Positive for suicidal ideas. The patient is nervous/anxious.         Physical Exam  Temp:  [36 °C (96.8 °F)-36.7 °C (98 °F)] 36.7 °C (98 °F)  Pulse:  [62-80] 80  Resp:  [17-18] 17  BP: (105-153)/(52-69) 109/68  SpO2:  [93 %-96 %] 96 %    Physical Exam   Constitutional: He is oriented to person, place, and time. He appears well-developed.   HENT:   Head: Normocephalic and atraumatic.   Eyes: Pupils are equal, round, and reactive to light. Conjunctivae are normal.   Neck: No tracheal deviation present. No thyromegaly present.   Cardiovascular: Normal rate and regular rhythm.    Pulmonary/Chest: Effort normal and breath sounds normal.   Abdominal:  Soft. Bowel sounds are normal. He exhibits no distension. There is no tenderness.   Musculoskeletal: He exhibits edema.   Right 1st toe amputation   Lymphadenopathy:     He has no cervical adenopathy.   Neurological: He is alert and oriented to person, place, and time. No cranial nerve deficit.   MMT 5/5   Skin:   Venous stasis both legs   Nursing note and vitals reviewed.      Fluids    Intake/Output Summary (Last 24 hours) at 02/06/19 1331  Last data filed at 02/06/19 1300   Gross per 24 hour   Intake             1180 ml   Output             2300 ml   Net            -1120 ml       Laboratory  Recent Labs      02/05/19   1035   WBC  6.1   RBC  4.89   HEMOGLOBIN  13.9*   HEMATOCRIT  44.3   MCV  90.6   MCH  28.4   MCHC  31.4*   RDW  48.8   PLATELETCT  187   MPV  10.6     Recent Labs      02/05/19   1035   SODIUM  135   POTASSIUM  4.2   CHLORIDE  105   CO2  22   GLUCOSE  90   BUN  22   CREATININE  0.74   CALCIUM  8.7                   Imaging  DX-HIP-UNILATERAL-WITH PELVIS-1 VIEW RIGHT   Final Result      No radiographic evidence of acute traumatic injury right hip.      DX-CHEST-PORTABLE (1 VIEW)   Final Result      Hypoinflation without other evidence for acute cardiopulmonary disease.      MR-BRAIN-W/O   Final Result      1.  MRI of the brain without contrast within normal limits for age with mild white matter changes.   2.  Chronic appearing sinus disease   3.  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 This does not have an aggressive appearance.      US-EXTREMITY VENOUS LOWER BILAT   Final Result      CT-HAND WITH RIGHT   Final Result      1.  Edema in the dorsum of the forearm and hand without a discrete fluid collection to suggest abscess is identified.      US-CAROTID DOPPLER BILAT   Final Result      DX-LUMBAR SPINE-2 OR 3 VIEWS   Final Result         Stable compression deformity in the L1 vertebral body.      CT-CTA CHEST PULMONARY ARTERY W/ RECONS   Final  Result         1. No CT evidence of pulmonary embolism.      2. Old right rib fractures.      3. Cholelithiasis. Enlarged spleen with granulomas.      4. Mild superior endplate compression deformity of L1 could be acute. Correlate with point tenderness. No retropulsion. No malalignment.      CT-CSPINE WITHOUT PLUS RECONS   Final Result         1. No acute fracture from C1 through T3 is visualized.         CT-HEAD W/O   Final Result         1. No acute intracranial abnormality. No evidence of acute intracranial hemorrhage or mass lesion.               DX-THORACIC SPINE-WITH SWIMMERS VIEW   Final Result      No acute thoracic spine fracture or subluxation identified.      DX-CHEST-PORTABLE (1 VIEW)   Final Result      No acute cardiac or pulmonary abnormality is noted.           Assessment/Plan  * Orthostatic hypotension- (present on admission)   Assessment & Plan    Florinef, Midodrine     Suicidal ideation   Assessment & Plan    Prozac, Risperdal  1:1 sitter     Fall from ground level- (present on admission)   Assessment & Plan    PT/OT     Syncope- (present on admission)   Assessment & Plan    Autonomic dysfunction?       Debility- (present on admission)   Assessment & Plan    PT/OT     Acute respiratory failure with hypoxia (HCC)- (present on admission)   Assessment & Plan    Resolved     Lumbar compression fracture (HCC)- (present on admission)   Assessment & Plan    TLSO brace   pain control     Psychogenic nonepileptic seizure- (present on admission)   Assessment & Plan    no treatment indicated.     Chronic diastolic heart failure (HCC)- (present on admission)   Assessment & Plan    Off Lisinopril and Lasix     Venous stasis dermatitis of both lower extremities- (present on admission)   Assessment & Plan    Compression     Bilateral leg edema- (present on admission)   Assessment & Plan    compression     Vitamin B12 deficiency- (present on admission)   Assessment & Plan    oral B12     Paroxysmal A-fib (HCC)-  (present on admission)   Assessment & Plan    Amiodarone, ASA  [id number] - 3     Essential hypertension- (present on admission)   Assessment & Plan    Off Lisinopril     Mixed hyperlipidemia- (present on admission)   Assessment & Plan    Pravastatin       Type 2 diabetes mellitus, without long-term current use of insulin, with peripheral neuropathy (HCC)- (present on admission)   Assessment & Plan    Diet controlled     Hyponatremia- (present on admission)   Assessment & Plan    Salt tabs          VTE prophylaxis: Lovenox

## 2019-02-06 NOTE — DOCUMENTATION QUERY
[facility]                                                                         Query Response Note      PATIENT:               EMELY FLORES  ACCT #:                  [id number]  MRN:                       [id number]  :                       1965  ADMIT DATE:       2018 5:24 PM  DISCH DATE:          RESPONDING  PROVIDER #:        [id number]           RESPONSE TEXT:    Severe Major Depression- Single episode    QUERY TEXT:    Depression Type [id number]    Depression is documented in the Medical Record.  Please specify the type.    NOTE:  If an appropriate response is not listed below, please respond with a new note.    The patient's Clinical Indicators include:  Patient has suicidal ideation.  Query created by: Satya Oreilly on 2019 5:36 AM        Electronically signed by:  PRAVIN STREET MD 2019 5:13 PM

## 2019-02-06 NOTE — CARE PLAN
Problem: Venous Thromboembolism (VTW)/Deep Vein Thrombosis (DVT) Prevention:  Goal: Patient will participate in Venous Thrombosis (VTE)/Deep Vein Thrombosis (DVT)Prevention Measures  Outcome: PROGRESSING AS EXPECTED  RADHA hose in place. Pt educated on SCDs.    Problem: Mobility  Goal: Risk for activity intolerance will decrease  Outcome: PROGRESSING AS EXPECTED  Pt to get up for every meal.

## 2019-02-07 PROCEDURE — A9270 NON-COVERED ITEM OR SERVICE: HCPCS | Performed by: HOSPITALIST

## 2019-02-07 PROCEDURE — 700102 HCHG RX REV CODE 250 W/ 637 OVERRIDE(OP): Performed by: HOSPITALIST

## 2019-02-07 PROCEDURE — 700102 HCHG RX REV CODE 250 W/ 637 OVERRIDE(OP): Performed by: INTERNAL MEDICINE

## 2019-02-07 PROCEDURE — A9270 NON-COVERED ITEM OR SERVICE: HCPCS | Performed by: INTERNAL MEDICINE

## 2019-02-07 PROCEDURE — 700111 HCHG RX REV CODE 636 W/ 250 OVERRIDE (IP): Performed by: FAMILY MEDICINE

## 2019-02-07 PROCEDURE — A9270 NON-COVERED ITEM OR SERVICE: HCPCS | Performed by: PSYCHIATRY & NEUROLOGY

## 2019-02-07 PROCEDURE — A9270 NON-COVERED ITEM OR SERVICE: HCPCS | Performed by: FAMILY MEDICINE

## 2019-02-07 PROCEDURE — 700102 HCHG RX REV CODE 250 W/ 637 OVERRIDE(OP): Performed by: PSYCHIATRY & NEUROLOGY

## 2019-02-07 PROCEDURE — 700102 HCHG RX REV CODE 250 W/ 637 OVERRIDE(OP): Performed by: FAMILY MEDICINE

## 2019-02-07 PROCEDURE — 99214 OFFICE O/P EST MOD 30 MIN: CPT | Performed by: PSYCHIATRY & NEUROLOGY

## 2019-02-07 PROCEDURE — G0378 HOSPITAL OBSERVATION PER HR: HCPCS

## 2019-02-07 PROCEDURE — 99224 PR SUBSEQUENT OBSERVATION CARE,LEVEL I: CPT | Performed by: FAMILY MEDICINE

## 2019-02-07 RX ADMIN — RISPERIDONE 1 MG: 0.5 TABLET ORAL at 16:51

## 2019-02-07 RX ADMIN — MULTIPLE VITAMINS W/ MINERALS TAB 1 TABLET: TAB at 05:17

## 2019-02-07 RX ADMIN — ACETAMINOPHEN 650 MG: 325 TABLET, FILM COATED ORAL at 11:42

## 2019-02-07 RX ADMIN — OXYCODONE HYDROCHLORIDE AND ACETAMINOPHEN 500 MG: 500 TABLET ORAL at 05:17

## 2019-02-07 RX ADMIN — MIDODRINE HYDROCHLORIDE 10 MG: 5 TABLET ORAL at 17:56

## 2019-02-07 RX ADMIN — METOCLOPRAMIDE HYDROCHLORIDE 10 MG: 10 TABLET ORAL at 16:51

## 2019-02-07 RX ADMIN — OXYCODONE HYDROCHLORIDE 5 MG: 5 TABLET ORAL at 05:17

## 2019-02-07 RX ADMIN — PYRIDOSTIGMINE BROMIDE 90 MG: 60 TABLET ORAL at 11:42

## 2019-02-07 RX ADMIN — PYRIDOSTIGMINE BROMIDE 90 MG: 60 TABLET ORAL at 05:18

## 2019-02-07 RX ADMIN — OXYCODONE HYDROCHLORIDE 5 MG: 5 TABLET ORAL at 08:46

## 2019-02-07 RX ADMIN — PRAVASTATIN SODIUM 20 MG: 20 TABLET ORAL at 16:51

## 2019-02-07 RX ADMIN — MIDODRINE HYDROCHLORIDE 10 MG: 5 TABLET ORAL at 12:16

## 2019-02-07 RX ADMIN — METOCLOPRAMIDE HYDROCHLORIDE 10 MG: 10 TABLET ORAL at 11:42

## 2019-02-07 RX ADMIN — SODIUM CHLORIDE TAB 1 GM 1 G: 1 TAB at 08:45

## 2019-02-07 RX ADMIN — SODIUM CHLORIDE TAB 1 GM 1 G: 1 TAB at 11:42

## 2019-02-07 RX ADMIN — METOCLOPRAMIDE HYDROCHLORIDE 10 MG: 10 TABLET ORAL at 08:46

## 2019-02-07 RX ADMIN — SODIUM CHLORIDE TAB 1 GM 1 G: 1 TAB at 16:51

## 2019-02-07 RX ADMIN — FLUOXETINE HYDROCHLORIDE 60 MG: 20 CAPSULE ORAL at 05:17

## 2019-02-07 RX ADMIN — FLUDROCORTISONE ACETATE 0.3 MG: 0.1 TABLET ORAL at 05:18

## 2019-02-07 RX ADMIN — PYRIDOSTIGMINE BROMIDE 90 MG: 60 TABLET ORAL at 16:50

## 2019-02-07 RX ADMIN — CYANOCOBALAMIN TAB 500 MCG 1000 MCG: 500 TAB at 05:18

## 2019-02-07 RX ADMIN — ENOXAPARIN SODIUM 40 MG: 100 INJECTION SUBCUTANEOUS at 05:18

## 2019-02-07 RX ADMIN — ASPIRIN 81 MG 81 MG: 81 TABLET ORAL at 05:18

## 2019-02-07 RX ADMIN — MIDODRINE HYDROCHLORIDE 10 MG: 5 TABLET ORAL at 05:17

## 2019-02-07 RX ADMIN — AMIODARONE HYDROCHLORIDE 50 MG: 200 TABLET ORAL at 05:18

## 2019-02-07 RX ADMIN — Medication 400 MG: at 05:18

## 2019-02-07 ASSESSMENT — ENCOUNTER SYMPTOMS
DIZZINESS: 1
HEADACHES: 0
NAUSEA: 0
SHORTNESS OF BREATH: 0
NERVOUS/ANXIOUS: 0
WHEEZING: 0
FEVER: 0
COUGH: 0
BACK PAIN: 0
CHILLS: 0
HEARTBURN: 0
BLURRED VISION: 0
VOMITING: 0
ABDOMINAL PAIN: 0
SORE THROAT: 0
WEAKNESS: 1
FOCAL WEAKNESS: 0
DIARRHEA: 0

## 2019-02-07 NOTE — PROGRESS NOTES
Report received. Assumed care, assessment complete. Pt is A & O x 4.  Pt reports back pain, declinesintervention. Fall precautions and appropriate signs in place. . Pt educated regarding fall precautions. Bed alarm in use. 1:1 sitter at bedside per Legal 2000. Pt denies any additional needs at this time. Call light within reach.

## 2019-02-07 NOTE — ADDENDUM NOTE
Encounter addended by: Maeve Kamara on: 2/7/2019 12:30 PM<BR>    Actions taken: Charge Capture section accepted

## 2019-02-07 NOTE — CARE PLAN
Problem: Knowledge Deficit  Goal: Knowledge of disease process/condition, treatment plan, diagnostic tests, and medications will improve  Outcome: PROGRESSING AS EXPECTED  Pt instructed on plan for legal hold and rationale for continuing. Pt understanding plan and is agreeable.    Problem: Mobility  Goal: Risk for activity intolerance will decrease  Outcome: PROGRESSING AS EXPECTED  Pt to increase mobility. Pt up for every meal and to ambulate in halls

## 2019-02-07 NOTE — DISCHARGE PLANNING
Received Stipulation and Order from the court continuing pt until 2/14/19. Sent copy to unit LSW.

## 2019-02-07 NOTE — PROGRESS NOTES
Report received. Assumed care at 0700, assessment complete. Pt is A&O x 4. 8/10 pain at this time in his back. Pt declining medication until he can have oxycodone later on. Patient instructed on the plan of care for the day. Pt on legal hold and safety sitter at bedside. Bed in lowest position. Bed alarm on. Call light within reach. Patient provided RN and CNA extension.

## 2019-02-07 NOTE — DISCHARGE PLANNING
ATTN: Case Management  RE: Referral for Home Health    Reason for referral denial: NON ADMIT: PATIENT IS CURRENTLY ON A LEGAL HOLD AND ONCE RELEASED WILL BE GOING TO A PSYCH FACILITY.              Unfortunately, we are not able to accept this referral for the reason listed above. If further clarity is needed, our Transitional Care Specialists are available to discuss any barriers to service at [phone].      We look forward to collaborating with you in the future,  [facility] Team

## 2019-02-07 NOTE — PROGRESS NOTES
Hospital Medicine Daily Progress Note    Date of Service  2/7/2019    Chief Complaint  53 y.o. male admitted 12/28/2018 with Syncope    Hospital Course  Admitted with Syncope, noted to have L1 compression fracture, work up negative except for orthostatic hypotension.  He then started complaining of suicidal thoughts.    Interval Problem Update  Orthostatic hypotension - BP better   Suicidal - feels that his thoughts are 50% reduced, mood remains up  Hypertension - controlled  L1 fracture - pain controlled    Consultants/Specialty  Psychiatry  Neurosurgery signed off    Code Status  Full    Disposition  Legal hold    Review of Systems  Review of Systems   Constitutional: Positive for malaise/fatigue. Negative for chills and fever.   HENT: Negative for hearing loss and sore throat.    Eyes: Negative for blurred vision.   Respiratory: Negative for cough, shortness of breath and wheezing.    Cardiovascular: Positive for leg swelling. Negative for chest pain.   Gastrointestinal: Negative for abdominal pain, diarrhea, heartburn, nausea and vomiting.   Genitourinary: Negative for dysuria.   Musculoskeletal: Negative for back pain.   Skin: Negative for rash.   Neurological: Positive for dizziness and weakness. Negative for focal weakness and headaches.   Psychiatric/Behavioral: Positive for suicidal ideas. The patient is not nervous/anxious.         Physical Exam  Temp:  [35.9 °C (96.7 °F)-36.7 °C (98 °F)] 36.6 °C (97.8 °F)  Pulse:  [61-73] 64  Resp:  [18-20] 20  BP: (130-170)/(65-87) 170/87  SpO2:  [94 %-96 %] 95 %    Physical Exam   Constitutional: He is oriented to person, place, and time. He appears well-developed.   HENT:   Head: Normocephalic and atraumatic.   Eyes: Pupils are equal, round, and reactive to light. Conjunctivae are normal.   Neck: No tracheal deviation present. No thyromegaly present.   Cardiovascular: Normal rate and regular rhythm.    Pulmonary/Chest: Effort normal and breath sounds normal.    Abdominal: Soft. Bowel sounds are normal. He exhibits no distension. There is no tenderness.   Musculoskeletal: He exhibits edema.   Right 1st toe amputation   Lymphadenopathy:     He has no cervical adenopathy.   Neurological: He is alert and oriented to person, place, and time. No cranial nerve deficit.   MMT 5/5   Skin:   Venous stasis both legs   Nursing note and vitals reviewed.      Fluids    Intake/Output Summary (Last 24 hours) at 02/07/19 1222  Last data filed at 02/07/19 1000   Gross per 24 hour   Intake              780 ml   Output             2300 ml   Net            -1520 ml       Laboratory  Recent Labs      02/05/19   1035   WBC  6.1   RBC  4.89   HEMOGLOBIN  13.9*   HEMATOCRIT  44.3   MCV  90.6   MCH  28.4   MCHC  31.4*   RDW  48.8   PLATELETCT  187   MPV  10.6     Recent Labs      02/05/19   1035   SODIUM  135   POTASSIUM  4.2   CHLORIDE  105   CO2  22   GLUCOSE  90   BUN  22   CREATININE  0.74   CALCIUM  8.7                   Imaging  DX-HIP-UNILATERAL-WITH PELVIS-1 VIEW RIGHT   Final Result      No radiographic evidence of acute traumatic injury right hip.      DX-CHEST-PORTABLE (1 VIEW)   Final Result      Hypoinflation without other evidence for acute cardiopulmonary disease.      MR-BRAIN-W/O   Final Result      1.  MRI of the brain without contrast within normal limits for age with mild white matter changes.   2.  Chronic appearing sinus disease   3.  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 This does not have an aggressive appearance.      US-EXTREMITY VENOUS LOWER BILAT   Final Result      CT-HAND WITH RIGHT   Final Result      1.  Edema in the dorsum of the forearm and hand without a discrete fluid collection to suggest abscess is identified.      US-CAROTID DOPPLER BILAT   Final Result      DX-LUMBAR SPINE-2 OR 3 VIEWS   Final Result         Stable compression deformity in the L1 vertebral body.      CT-CTA CHEST PULMONARY ARTERY W/ RECONS    Final Result         1. No CT evidence of pulmonary embolism.      2. Old right rib fractures.      3. Cholelithiasis. Enlarged spleen with granulomas.      4. Mild superior endplate compression deformity of L1 could be acute. Correlate with point tenderness. No retropulsion. No malalignment.      CT-CSPINE WITHOUT PLUS RECONS   Final Result         1. No acute fracture from C1 through T3 is visualized.         CT-HEAD W/O   Final Result         1. No acute intracranial abnormality. No evidence of acute intracranial hemorrhage or mass lesion.               DX-THORACIC SPINE-WITH SWIMMERS VIEW   Final Result      No acute thoracic spine fracture or subluxation identified.      DX-CHEST-PORTABLE (1 VIEW)   Final Result      No acute cardiac or pulmonary abnormality is noted.           Assessment/Plan  * Orthostatic hypotension- (present on admission)   Assessment & Plan    Florinef, Midodrine     Suicidal ideation   Assessment & Plan    Prozac, Risperdal  1:1 sitter     Fall from ground level- (present on admission)   Assessment & Plan    PT/OT     Syncope- (present on admission)   Assessment & Plan    Autonomic dysfunction?       Debility- (present on admission)   Assessment & Plan    PT/OT     Acute respiratory failure with hypoxia (HCC)- (present on admission)   Assessment & Plan    Resolved     Lumbar compression fracture (HCC)- (present on admission)   Assessment & Plan    TLSO brace   pain control     Psychogenic nonepileptic seizure- (present on admission)   Assessment & Plan    no treatment indicated.     Chronic diastolic heart failure (HCC)- (present on admission)   Assessment & Plan    Off Lisinopril and Lasix     Venous stasis dermatitis of both lower extremities- (present on admission)   Assessment & Plan    Compression     Bilateral leg edema- (present on admission)   Assessment & Plan    compression     Vitamin B12 deficiency- (present on admission)   Assessment & Plan    oral B12     Paroxysmal A-fib  (HCC)- (present on admission)   Assessment & Plan    Amiodarone, ASA  [id number] - 3     Essential hypertension- (present on admission)   Assessment & Plan    Off Lisinopril     Mixed hyperlipidemia- (present on admission)   Assessment & Plan    Pravastatin       Type 2 diabetes mellitus, without long-term current use of insulin, with peripheral neuropathy (HCC)- (present on admission)   Assessment & Plan    Diet controlled     Hyponatremia- (present on admission)   Assessment & Plan    Salt tabs          VTE prophylaxis: Lovenox

## 2019-02-07 NOTE — CARE PLAN
Problem: Discharge Barriers/Planning  Goal: Patient's continuum of care needs will be met    Intervention: Collaborate with Transitional Care Team and Interdisciplinary Team to meet discharge needs  DC pending assessment from [facility]       Problem: Mobility  Goal: Risk for activity intolerance will decrease    Intervention: Assess and monitor signs of activity intolerance  Pt ambulating in room and halls with assistance

## 2019-02-08 PROCEDURE — A9270 NON-COVERED ITEM OR SERVICE: HCPCS | Performed by: HOSPITALIST

## 2019-02-08 PROCEDURE — 700102 HCHG RX REV CODE 250 W/ 637 OVERRIDE(OP): Performed by: PSYCHIATRY & NEUROLOGY

## 2019-02-08 PROCEDURE — 700102 HCHG RX REV CODE 250 W/ 637 OVERRIDE(OP): Performed by: FAMILY MEDICINE

## 2019-02-08 PROCEDURE — 700111 HCHG RX REV CODE 636 W/ 250 OVERRIDE (IP): Performed by: FAMILY MEDICINE

## 2019-02-08 PROCEDURE — A9270 NON-COVERED ITEM OR SERVICE: HCPCS | Performed by: PSYCHIATRY & NEUROLOGY

## 2019-02-08 PROCEDURE — 700102 HCHG RX REV CODE 250 W/ 637 OVERRIDE(OP): Performed by: HOSPITALIST

## 2019-02-08 PROCEDURE — G0378 HOSPITAL OBSERVATION PER HR: HCPCS

## 2019-02-08 PROCEDURE — A9270 NON-COVERED ITEM OR SERVICE: HCPCS | Performed by: INTERNAL MEDICINE

## 2019-02-08 PROCEDURE — A9270 NON-COVERED ITEM OR SERVICE: HCPCS | Performed by: FAMILY MEDICINE

## 2019-02-08 PROCEDURE — 700102 HCHG RX REV CODE 250 W/ 637 OVERRIDE(OP): Performed by: INTERNAL MEDICINE

## 2019-02-08 PROCEDURE — 306311 ANTI-EMBOLISM STOCKINGS XXLRG LNG: Performed by: FAMILY MEDICINE

## 2019-02-08 PROCEDURE — 99225 PR SUBSEQUENT OBSERVATION CARE,LEVEL II: CPT | Performed by: FAMILY MEDICINE

## 2019-02-08 RX ORDER — RISPERIDONE 0.5 MG/1
0.5 TABLET ORAL 2 TIMES DAILY
Status: DISCONTINUED | OUTPATIENT
Start: 2019-02-08 | End: 2019-02-15

## 2019-02-08 RX ADMIN — MIDODRINE HYDROCHLORIDE 10 MG: 5 TABLET ORAL at 05:11

## 2019-02-08 RX ADMIN — SODIUM CHLORIDE TAB 1 GM 1 G: 1 TAB at 17:08

## 2019-02-08 RX ADMIN — ENOXAPARIN SODIUM 40 MG: 100 INJECTION SUBCUTANEOUS at 05:11

## 2019-02-08 RX ADMIN — FLUDROCORTISONE ACETATE 0.3 MG: 0.1 TABLET ORAL at 05:12

## 2019-02-08 RX ADMIN — SODIUM CHLORIDE TAB 1 GM 1 G: 1 TAB at 11:00

## 2019-02-08 RX ADMIN — METOCLOPRAMIDE HYDROCHLORIDE 10 MG: 10 TABLET ORAL at 07:39

## 2019-02-08 RX ADMIN — OXYCODONE HYDROCHLORIDE 5 MG: 5 TABLET ORAL at 11:08

## 2019-02-08 RX ADMIN — RISPERIDONE 0.5 MG: 0.5 TABLET ORAL at 17:08

## 2019-02-08 RX ADMIN — PRAVASTATIN SODIUM 20 MG: 20 TABLET ORAL at 17:07

## 2019-02-08 RX ADMIN — PYRIDOSTIGMINE BROMIDE 90 MG: 60 TABLET ORAL at 05:16

## 2019-02-08 RX ADMIN — AMIODARONE HYDROCHLORIDE 50 MG: 200 TABLET ORAL at 05:11

## 2019-02-08 RX ADMIN — ASPIRIN 81 MG 81 MG: 81 TABLET ORAL at 05:11

## 2019-02-08 RX ADMIN — METOCLOPRAMIDE HYDROCHLORIDE 10 MG: 10 TABLET ORAL at 11:00

## 2019-02-08 RX ADMIN — OXYCODONE HYDROCHLORIDE AND ACETAMINOPHEN 500 MG: 500 TABLET ORAL at 05:11

## 2019-02-08 RX ADMIN — CYANOCOBALAMIN TAB 500 MCG 1000 MCG: 500 TAB at 05:11

## 2019-02-08 RX ADMIN — PYRIDOSTIGMINE BROMIDE 90 MG: 60 TABLET ORAL at 11:00

## 2019-02-08 RX ADMIN — Medication 400 MG: at 05:12

## 2019-02-08 RX ADMIN — PYRIDOSTIGMINE BROMIDE 90 MG: 60 TABLET ORAL at 17:07

## 2019-02-08 RX ADMIN — SODIUM CHLORIDE TAB 1 GM 1 G: 1 TAB at 07:39

## 2019-02-08 RX ADMIN — FLUOXETINE HYDROCHLORIDE 60 MG: 20 CAPSULE ORAL at 05:11

## 2019-02-08 RX ADMIN — METOCLOPRAMIDE HYDROCHLORIDE 10 MG: 10 TABLET ORAL at 17:08

## 2019-02-08 RX ADMIN — MULTIPLE VITAMINS W/ MINERALS TAB 1 TABLET: TAB at 05:20

## 2019-02-08 ASSESSMENT — ENCOUNTER SYMPTOMS
BACK PAIN: 0
VOMITING: 0
SHORTNESS OF BREATH: 0
NERVOUS/ANXIOUS: 0
WHEEZING: 0
WEAKNESS: 1
HEARTBURN: 0
FOCAL WEAKNESS: 0
DIARRHEA: 0
HEADACHES: 0
SORE THROAT: 0
NAUSEA: 0
CHILLS: 0
FEVER: 0
ABDOMINAL PAIN: 0
COUGH: 0
DIZZINESS: 1
BLURRED VISION: 0

## 2019-02-08 NOTE — PROGRESS NOTES
Assumed care of pt at 1930. Report received and bedside rounding completed with day RN. Pt in bed resting comfortably denies any pain at this time and is calm. No SOB, or in any acute distress.   Pt currently on a legal hold SI sitter at bedside 1:1 direct supervision.      Fall precautions in place, pt is aaox4 education provided high fal risk level. bed alarm. - Treaded non slip socks. Call light within reach - pt makes needs known - hourly rounding in place. See flowsheets for further assessment.

## 2019-02-08 NOTE — PROGRESS NOTES
"Assumed care of pt at 0700. Received report from RN. Pt A&Ox4. Assessment complete. Labs reviewed. Pt denies pain at this time. Pt up SBA with FWW. Pt continues to have suicidal thoughts with a plan. 1:1 sitter at bedside, sitter card updated. Pt asking to have Risperidone decreased, states \"causing me to have worse thoughts I feel.\" Pt and RN discussed plan of care. Pt questions answered. Pt needs are met at this time. Bed in lowest and locked position. Call light within reach. Upper bed rails up. Hourly rounding in place.   "

## 2019-02-08 NOTE — PROGRESS NOTES
Hospital Medicine Daily Progress Note    Date of Service  2/8/2019    Chief Complaint  53 y.o. male admitted 12/28/2018 with Syncope    Hospital Course  Admitted with Syncope, noted to have L1 compression fracture, work up negative except for orthostatic hypotension.  He then started complaining of suicidal thoughts.    Interval Problem Update  Orthostatic hypotension - BP better   Suicidal - feels that his thoughts are up to 75% today, he also thinks the Risperdal is causing him nightmares, discussed case with Psychiatry  Hypertension - mostly controlled  L1 fracture - pain controlled    Consultants/Specialty  Psychiatry  Neurosurgery signed off    Code Status  Full    Disposition  Legal hold    Review of Systems  Review of Systems   Constitutional: Positive for malaise/fatigue. Negative for chills and fever.   HENT: Negative for hearing loss and sore throat.    Eyes: Negative for blurred vision.   Respiratory: Negative for cough, shortness of breath and wheezing.    Cardiovascular: Positive for leg swelling. Negative for chest pain.   Gastrointestinal: Negative for abdominal pain, diarrhea, heartburn, nausea and vomiting.   Genitourinary: Negative for dysuria.   Musculoskeletal: Negative for back pain.   Skin: Negative for rash.   Neurological: Positive for dizziness and weakness. Negative for focal weakness and headaches.   Psychiatric/Behavioral: Positive for suicidal ideas. The patient is not nervous/anxious.         Physical Exam  Temp:  [36.2 °C (97.2 °F)-36.8 °C (98.2 °F)] 36.2 °C (97.2 °F)  Pulse:  [61-74] 73  Resp:  [17] 17  BP: (129-165)/(69-86) 165/77  SpO2:  [94 %-96 %] 95 %    Physical Exam   Constitutional: He is oriented to person, place, and time. He appears well-developed.   HENT:   Head: Normocephalic and atraumatic.   Eyes: Pupils are equal, round, and reactive to light. Conjunctivae are normal.   Neck: No tracheal deviation present. No thyromegaly present.   Cardiovascular: Normal rate and  regular rhythm.    Pulmonary/Chest: Effort normal and breath sounds normal.   Abdominal: Soft. Bowel sounds are normal. He exhibits no distension. There is no tenderness.   Musculoskeletal: He exhibits edema.   Right 1st toe amputation   Lymphadenopathy:     He has no cervical adenopathy.   Neurological: He is alert and oriented to person, place, and time. No cranial nerve deficit.   MMT 5/5   Skin:   Venous stasis both legs   Nursing note and vitals reviewed.      Fluids    Intake/Output Summary (Last 24 hours) at 02/08/19 1448  Last data filed at 02/08/19 0930   Gross per 24 hour   Intake              240 ml   Output              700 ml   Net             -460 ml       Laboratory                        Imaging  DX-HIP-UNILATERAL-WITH PELVIS-1 VIEW RIGHT   Final Result      No radiographic evidence of acute traumatic injury right hip.      DX-CHEST-PORTABLE (1 VIEW)   Final Result      Hypoinflation without other evidence for acute cardiopulmonary disease.      MR-BRAIN-W/O   Final Result      1.  MRI of the brain without contrast within normal limits for age with mild white matter changes.   2.  Chronic appearing sinus disease   3.  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 This does not have an aggressive appearance.      US-EXTREMITY VENOUS LOWER BILAT   Final Result      CT-HAND WITH RIGHT   Final Result      1.  Edema in the dorsum of the forearm and hand without a discrete fluid collection to suggest abscess is identified.      US-CAROTID DOPPLER BILAT   Final Result      DX-LUMBAR SPINE-2 OR 3 VIEWS   Final Result         Stable compression deformity in the L1 vertebral body.      CT-CTA CHEST PULMONARY ARTERY W/ RECONS   Final Result         1. No CT evidence of pulmonary embolism.      2. Old right rib fractures.      3. Cholelithiasis. Enlarged spleen with granulomas.      4. Mild superior endplate compression deformity of L1 could be acute. Correlate with  point tenderness. No retropulsion. No malalignment.      CT-CSPINE WITHOUT PLUS RECONS   Final Result         1. No acute fracture from C1 through T3 is visualized.         CT-HEAD W/O   Final Result         1. No acute intracranial abnormality. No evidence of acute intracranial hemorrhage or mass lesion.               DX-THORACIC SPINE-WITH SWIMMERS VIEW   Final Result      No acute thoracic spine fracture or subluxation identified.      DX-CHEST-PORTABLE (1 VIEW)   Final Result      No acute cardiac or pulmonary abnormality is noted.           Assessment/Plan  * Orthostatic hypotension- (present on admission)   Assessment & Plan    Florinef, Midodrine     Suicidal ideation   Assessment & Plan    Prozac, change Risperdal to 0.5 mg BID  1:1 sitter     Fall from ground level- (present on admission)   Assessment & Plan    PT/OT     Syncope- (present on admission)   Assessment & Plan    Autonomic dysfunction?       Debility- (present on admission)   Assessment & Plan    PT/OT     Acute respiratory failure with hypoxia (HCC)- (present on admission)   Assessment & Plan    Resolved     Lumbar compression fracture (HCC)- (present on admission)   Assessment & Plan    TLSO brace   pain control     Psychogenic nonepileptic seizure- (present on admission)   Assessment & Plan    no treatment indicated.     Chronic diastolic heart failure (HCC)- (present on admission)   Assessment & Plan    Off Lisinopril and Lasix     Venous stasis dermatitis of both lower extremities- (present on admission)   Assessment & Plan    Compression     Bilateral leg edema- (present on admission)   Assessment & Plan    compression     Vitamin B12 deficiency- (present on admission)   Assessment & Plan    oral B12     Paroxysmal A-fib (HCC)- (present on admission)   Assessment & Plan    Amiodarone, ASA  [id number] - 3     Essential hypertension- (present on admission)   Assessment & Plan    Off Lisinopril     Mixed hyperlipidemia- (present on admission)    Assessment & Plan    Pravastatin       Type 2 diabetes mellitus, without long-term current use of insulin, with peripheral neuropathy (HCC)- (present on admission)   Assessment & Plan    Diet controlled     Hyponatremia- (present on admission)   Assessment & Plan    Salt tabs          VTE prophylaxis: Lovenox

## 2019-02-08 NOTE — PSYCHIATRY
"PSYCHIATRIC FOLLOW UP:       Reason for consult: si  Consulting phsyician: Slime    HPI:       Sebastián Castro is a 53 y.o. year old male with a PMH of CHF, a fib, DM, HTN,  a fib s/p cardioversion, orthostatic hypotentison with depressed mood, PTSD, and some functional seizures. He was also noted to have an L1 compression fracture.     Still having depressed mood. Nightmares are improving. Mood is improving. He reports this is the first day he hasn't felt like killing himself althought \"the thoughts still come and go\". He would like to go up on the risperdal and states that \"the medication is really working better than the other stuff\". He reports that his fiance is working on getting them a place to stay. He is staying at the shelter currently. Denies voices today.       Psychiatric Examination: observed phenomenon:  Vitals: /86   Pulse 61   Temp 36.6 °C (97.8 °F) (Temporal)   Resp 17   Ht 1.829 m (6')   Wt 117.5 kg (259 lb 0.7 oz)   SpO2 96%   BMI 35.13 kg/m²  Body mass index is 35.13 kg/m².  Musculoskeletal psychomotor retardation   Appearance: grooming fair   Thoughts Process:Logical and sequential, goal-directed   Thought Content:  No a/vh, no evidence of delusions, no ideas of reference, no internal stimulation noted   Speech: Nl tone, rate, and volume. Not pressured. Understandable.   Mood:    depressed  Affect:    constricted  SI/HI:   si is decreasing.   Attention/Alertness:   Awake, alert.   Memory:    Grossly intact.   Orientation:    Grossly intact.   Cognition:Grossly intact.   Insight:fair   Judgement:  Fair   Neurological Testing:    Medical systems reviewed:   Eyes:no blurry vision   Skin: no rash   CV: no palpitations no cp   Lungs:no sob   Neuro: no numbness tingling, no ha.   GI:no n/v   :no dysuria   Endocrine:  Psych:see hpi   Musculoskeletal: some pain   All other systems reviewed and are negative.       Soc history:    Appears he has been denied at [facility] due to being a fall " risk   Cannot live at homeless shelter with his condition.   PT has stated he will need home health, wheelchair, and larry     Lab results/tests:   No results found for this or any previous visit (from the past 48 hour(s)).  DX-HIP-UNILATERAL-WITH PELVIS-1 VIEW RIGHT   Final Result      No radiographic evidence of acute traumatic injury right hip.      DX-CHEST-PORTABLE (1 VIEW)   Final Result      Hypoinflation without other evidence for acute cardiopulmonary disease.      MR-BRAIN-W/O   Final Result      1.  MRI of the brain without contrast within normal limits for age with mild white matter changes.   2.  Chronic appearing sinus disease   3.  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 This does not have an aggressive appearance.      US-EXTREMITY VENOUS LOWER BILAT   Final Result      CT-HAND WITH RIGHT   Final Result      1.  Edema in the dorsum of the forearm and hand without a discrete fluid collection to suggest abscess is identified.      US-CAROTID DOPPLER BILAT   Final Result      DX-LUMBAR SPINE-2 OR 3 VIEWS   Final Result         Stable compression deformity in the L1 vertebral body.      CT-CTA CHEST PULMONARY ARTERY W/ RECONS   Final Result         1. No CT evidence of pulmonary embolism.      2. Old right rib fractures.      3. Cholelithiasis. Enlarged spleen with granulomas.      4. Mild superior endplate compression deformity of L1 could be acute. Correlate with point tenderness. No retropulsion. No malalignment.      CT-CSPINE WITHOUT PLUS RECONS   Final Result         1. No acute fracture from C1 through T3 is visualized.         CT-HEAD W/O   Final Result         1. No acute intracranial abnormality. No evidence of acute intracranial hemorrhage or mass lesion.               DX-THORACIC SPINE-WITH SWIMMERS VIEW   Final Result      No acute thoracic spine fracture or subluxation identified.      DX-CHEST-PORTABLE (1 VIEW)   Final Result      No acute  cardiac or pulmonary abnormality is noted.               Assessment:    MDD, severe, without psychotic features     Plan:  legal hold: extended    Continue prozac  Increasing risperdal.

## 2019-02-08 NOTE — CARE PLAN
Problem: Communication  Goal:   The ability to communicate needs accurately and effectively will improve. Communication board updated. POC discussed with pt.      Problem: Safety  Goal: Will remain free from injury  Outcome: PROGRESSING AS EXPECTED  Pt has 1:1 sitter at bedside. All potential harmful objects removed pt under direct supervision. Hourly rounding by this RN.   Pt is a high fall risk. edu provided and pt on a bed alarm. Pt verbalizing understanding.

## 2019-02-08 NOTE — CARE PLAN
Problem: Safety  Goal: Will remain free from falls  Outcome: PROGRESSING AS EXPECTED  Pt feels stronger on feet, up to chair, FWW in use, bed in lowest and locked position, call light within reach, 1:1 sitter at bedside.     Problem: Mobility  Goal: Risk for activity intolerance will decrease  Outcome: PROGRESSING AS EXPECTED  Pt up frequently to chair and ambulating. Pt turns self in bed.

## 2019-02-09 LAB
ALBUMIN SERPL BCP-MCNC: 3.7 G/DL (ref 3.2–4.9)
ALBUMIN/GLOB SERPL: 1.3 G/DL
ALP SERPL-CCNC: 65 U/L (ref 30–99)
ALT SERPL-CCNC: 21 U/L (ref 2–50)
ANION GAP SERPL CALC-SCNC: 3 MMOL/L (ref 0–11.9)
AST SERPL-CCNC: 19 U/L (ref 12–45)
BASOPHILS # BLD AUTO: 0.4 % (ref 0–1.8)
BASOPHILS # BLD: 0.03 K/UL (ref 0–0.12)
BILIRUB SERPL-MCNC: 0.4 MG/DL (ref 0.1–1.5)
BUN SERPL-MCNC: 27 MG/DL (ref 8–22)
CALCIUM SERPL-MCNC: 9.4 MG/DL (ref 8.5–10.5)
CHLORIDE SERPL-SCNC: 103 MMOL/L (ref 96–112)
CO2 SERPL-SCNC: 26 MMOL/L (ref 20–33)
CREAT SERPL-MCNC: 0.7 MG/DL (ref 0.5–1.4)
EOSINOPHIL # BLD AUTO: 0.33 K/UL (ref 0–0.51)
EOSINOPHIL NFR BLD: 4.2 % (ref 0–6.9)
ERYTHROCYTE [DISTWIDTH] IN BLOOD BY AUTOMATED COUNT: 47.1 FL (ref 35.9–50)
GLOBULIN SER CALC-MCNC: 2.9 G/DL (ref 1.9–3.5)
GLUCOSE SERPL-MCNC: 107 MG/DL (ref 65–99)
HCT VFR BLD AUTO: 42.9 % (ref 42–52)
HGB BLD-MCNC: 14 G/DL (ref 14–18)
IMM GRANULOCYTES # BLD AUTO: 0.01 K/UL (ref 0–0.11)
IMM GRANULOCYTES NFR BLD AUTO: 0.1 % (ref 0–0.9)
LYMPHOCYTES # BLD AUTO: 2.31 K/UL (ref 1–4.8)
LYMPHOCYTES NFR BLD: 29.4 % (ref 22–41)
MCH RBC QN AUTO: 28.6 PG (ref 27–33)
MCHC RBC AUTO-ENTMCNC: 32.6 G/DL (ref 33.7–35.3)
MCV RBC AUTO: 87.6 FL (ref 81.4–97.8)
MONOCYTES # BLD AUTO: 0.59 K/UL (ref 0–0.85)
MONOCYTES NFR BLD AUTO: 7.5 % (ref 0–13.4)
NEUTROPHILS # BLD AUTO: 4.59 K/UL (ref 1.82–7.42)
NEUTROPHILS NFR BLD: 58.4 % (ref 44–72)
NRBC # BLD AUTO: 0 K/UL
NRBC BLD-RTO: 0 /100 WBC
PLATELET # BLD AUTO: 186 K/UL (ref 164–446)
PMV BLD AUTO: 10.1 FL (ref 9–12.9)
POTASSIUM SERPL-SCNC: 4.1 MMOL/L (ref 3.6–5.5)
PROT SERPL-MCNC: 6.6 G/DL (ref 6–8.2)
RBC # BLD AUTO: 4.9 M/UL (ref 4.7–6.1)
SODIUM SERPL-SCNC: 132 MMOL/L (ref 135–145)
WBC # BLD AUTO: 7.9 K/UL (ref 4.8–10.8)

## 2019-02-09 PROCEDURE — 700111 HCHG RX REV CODE 636 W/ 250 OVERRIDE (IP): Performed by: FAMILY MEDICINE

## 2019-02-09 PROCEDURE — 700102 HCHG RX REV CODE 250 W/ 637 OVERRIDE(OP): Performed by: HOSPITALIST

## 2019-02-09 PROCEDURE — 80053 COMPREHEN METABOLIC PANEL: CPT

## 2019-02-09 PROCEDURE — A9270 NON-COVERED ITEM OR SERVICE: HCPCS | Performed by: HOSPITALIST

## 2019-02-09 PROCEDURE — 700102 HCHG RX REV CODE 250 W/ 637 OVERRIDE(OP): Performed by: PSYCHIATRY & NEUROLOGY

## 2019-02-09 PROCEDURE — A9270 NON-COVERED ITEM OR SERVICE: HCPCS | Performed by: FAMILY MEDICINE

## 2019-02-09 PROCEDURE — 85025 COMPLETE CBC W/AUTO DIFF WBC: CPT

## 2019-02-09 PROCEDURE — A9270 NON-COVERED ITEM OR SERVICE: HCPCS | Performed by: INTERNAL MEDICINE

## 2019-02-09 PROCEDURE — A9270 NON-COVERED ITEM OR SERVICE: HCPCS | Performed by: PSYCHIATRY & NEUROLOGY

## 2019-02-09 PROCEDURE — 99224 PR SUBSEQUENT OBSERVATION CARE,LEVEL I: CPT | Performed by: FAMILY MEDICINE

## 2019-02-09 PROCEDURE — 700102 HCHG RX REV CODE 250 W/ 637 OVERRIDE(OP): Performed by: FAMILY MEDICINE

## 2019-02-09 PROCEDURE — G0378 HOSPITAL OBSERVATION PER HR: HCPCS

## 2019-02-09 PROCEDURE — 36415 COLL VENOUS BLD VENIPUNCTURE: CPT

## 2019-02-09 PROCEDURE — 700102 HCHG RX REV CODE 250 W/ 637 OVERRIDE(OP): Performed by: INTERNAL MEDICINE

## 2019-02-09 RX ADMIN — METOCLOPRAMIDE HYDROCHLORIDE 10 MG: 10 TABLET ORAL at 17:53

## 2019-02-09 RX ADMIN — AMIODARONE HYDROCHLORIDE 50 MG: 200 TABLET ORAL at 06:09

## 2019-02-09 RX ADMIN — MIDODRINE HYDROCHLORIDE 10 MG: 5 TABLET ORAL at 06:09

## 2019-02-09 RX ADMIN — RISPERIDONE 0.5 MG: 0.5 TABLET ORAL at 17:53

## 2019-02-09 RX ADMIN — OXYCODONE HYDROCHLORIDE 5 MG: 5 TABLET ORAL at 10:30

## 2019-02-09 RX ADMIN — FLUDROCORTISONE ACETATE 0.3 MG: 0.1 TABLET ORAL at 06:11

## 2019-02-09 RX ADMIN — CYANOCOBALAMIN TAB 500 MCG 1000 MCG: 500 TAB at 06:08

## 2019-02-09 RX ADMIN — MIDODRINE HYDROCHLORIDE 10 MG: 5 TABLET ORAL at 10:30

## 2019-02-09 RX ADMIN — FLUOXETINE HYDROCHLORIDE 60 MG: 20 CAPSULE ORAL at 06:10

## 2019-02-09 RX ADMIN — SODIUM CHLORIDE TAB 1 GM 1 G: 1 TAB at 10:30

## 2019-02-09 RX ADMIN — PYRIDOSTIGMINE BROMIDE 90 MG: 60 TABLET ORAL at 06:10

## 2019-02-09 RX ADMIN — RISPERIDONE 0.5 MG: 0.5 TABLET ORAL at 06:11

## 2019-02-09 RX ADMIN — MIDODRINE HYDROCHLORIDE 10 MG: 5 TABLET ORAL at 17:53

## 2019-02-09 RX ADMIN — ASPIRIN 81 MG 81 MG: 81 TABLET ORAL at 06:11

## 2019-02-09 RX ADMIN — OXYCODONE HYDROCHLORIDE 5 MG: 5 TABLET ORAL at 17:53

## 2019-02-09 RX ADMIN — OXYCODONE HYDROCHLORIDE AND ACETAMINOPHEN 500 MG: 500 TABLET ORAL at 06:11

## 2019-02-09 RX ADMIN — PYRIDOSTIGMINE BROMIDE 90 MG: 60 TABLET ORAL at 17:52

## 2019-02-09 RX ADMIN — ENOXAPARIN SODIUM 40 MG: 100 INJECTION SUBCUTANEOUS at 06:13

## 2019-02-09 RX ADMIN — MULTIPLE VITAMINS W/ MINERALS TAB 1 TABLET: TAB at 06:12

## 2019-02-09 RX ADMIN — METOCLOPRAMIDE HYDROCHLORIDE 10 MG: 10 TABLET ORAL at 10:30

## 2019-02-09 RX ADMIN — PRAVASTATIN SODIUM 20 MG: 20 TABLET ORAL at 17:53

## 2019-02-09 RX ADMIN — SODIUM CHLORIDE TAB 1 GM 1 G: 1 TAB at 17:53

## 2019-02-09 RX ADMIN — ACETAMINOPHEN 650 MG: 325 TABLET, FILM COATED ORAL at 07:46

## 2019-02-09 RX ADMIN — METOCLOPRAMIDE HYDROCHLORIDE 10 MG: 10 TABLET ORAL at 06:08

## 2019-02-09 RX ADMIN — PYRIDOSTIGMINE BROMIDE 90 MG: 60 TABLET ORAL at 10:29

## 2019-02-09 RX ADMIN — Medication 400 MG: at 06:09

## 2019-02-09 RX ADMIN — SODIUM CHLORIDE TAB 1 GM 1 G: 1 TAB at 07:42

## 2019-02-09 ASSESSMENT — ENCOUNTER SYMPTOMS
BLURRED VISION: 0
SORE THROAT: 0
BACK PAIN: 0
CHILLS: 0
HEARTBURN: 0
SHORTNESS OF BREATH: 0
VOMITING: 0
DIARRHEA: 0
HEADACHES: 0
WHEEZING: 0
ABDOMINAL PAIN: 0
DIZZINESS: 1
FOCAL WEAKNESS: 0
NERVOUS/ANXIOUS: 0
FEVER: 0
NAUSEA: 0
WEAKNESS: 1
COUGH: 0

## 2019-02-09 NOTE — PROGRESS NOTES
Hospital Medicine Daily Progress Note    Date of Service  2/9/2019    Chief Complaint  53 y.o. male admitted 12/28/2018 with Syncope    Hospital Course  Admitted with Syncope, noted to have L1 compression fracture, work up negative except for orthostatic hypotension.  He then started complaining of suicidal thoughts.    Interval Problem Update  Orthostatic hypotension - BP more stable  Suicidal - feels that his thoughts are down to 50% today  Hypertension - mostly controlled  L1 fracture - pain controlled    Consultants/Specialty  Psychiatry  Neurosurgery signed off    Code Status  Full    Disposition  Legal hold    Review of Systems  Review of Systems   Constitutional: Positive for malaise/fatigue. Negative for chills and fever.   HENT: Negative for hearing loss and sore throat.    Eyes: Negative for blurred vision.   Respiratory: Negative for cough, shortness of breath and wheezing.    Cardiovascular: Positive for leg swelling. Negative for chest pain.   Gastrointestinal: Negative for abdominal pain, diarrhea, heartburn, nausea and vomiting.   Genitourinary: Negative for dysuria.   Musculoskeletal: Negative for back pain.   Skin: Negative for rash.   Neurological: Positive for dizziness and weakness. Negative for focal weakness and headaches.   Psychiatric/Behavioral: Positive for suicidal ideas. The patient is not nervous/anxious.         Physical Exam  Temp:  [36.1 °C (97 °F)-37.2 °C (98.9 °F)] 37.2 °C (98.9 °F)  Pulse:  [61-68] 68  Resp:  [17-19] 19  BP: (114-183)/(68-91) 160/85  SpO2:  [92 %-95 %] 92 %    Physical Exam   Constitutional: He is oriented to person, place, and time. He appears well-developed.   HENT:   Head: Normocephalic and atraumatic.   Eyes: Pupils are equal, round, and reactive to light. Conjunctivae are normal.   Neck: No tracheal deviation present. No thyromegaly present.   Cardiovascular: Normal rate and regular rhythm.    Pulmonary/Chest: Effort normal and breath sounds normal.    Abdominal: Soft. Bowel sounds are normal. He exhibits no distension. There is no tenderness.   Musculoskeletal: He exhibits edema.   Right 1st toe amputation   Lymphadenopathy:     He has no cervical adenopathy.   Neurological: He is alert and oriented to person, place, and time. No cranial nerve deficit.   MMT 5/5   Skin:   Venous stasis both legs   Nursing note and vitals reviewed.      Fluids    Intake/Output Summary (Last 24 hours) at 02/09/19 1137  Last data filed at 02/09/19 1119   Gross per 24 hour   Intake             1020 ml   Output              550 ml   Net              470 ml       Laboratory                        Imaging  DX-HIP-UNILATERAL-WITH PELVIS-1 VIEW RIGHT   Final Result      No radiographic evidence of acute traumatic injury right hip.      DX-CHEST-PORTABLE (1 VIEW)   Final Result      Hypoinflation without other evidence for acute cardiopulmonary disease.      MR-BRAIN-W/O   Final Result      1.  MRI of the brain without contrast within normal limits for age with mild white matter changes.   2.  Chronic appearing sinus disease   3.  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 This does not have an aggressive appearance.      US-EXTREMITY VENOUS LOWER BILAT   Final Result      CT-HAND WITH RIGHT   Final Result      1.  Edema in the dorsum of the forearm and hand without a discrete fluid collection to suggest abscess is identified.      US-CAROTID DOPPLER BILAT   Final Result      DX-LUMBAR SPINE-2 OR 3 VIEWS   Final Result         Stable compression deformity in the L1 vertebral body.      CT-CTA CHEST PULMONARY ARTERY W/ RECONS   Final Result         1. No CT evidence of pulmonary embolism.      2. Old right rib fractures.      3. Cholelithiasis. Enlarged spleen with granulomas.      4. Mild superior endplate compression deformity of L1 could be acute. Correlate with point tenderness. No retropulsion. No malalignment.      CT-CSPINE WITHOUT  PLUS RECONS   Final Result         1. No acute fracture from C1 through T3 is visualized.         CT-HEAD W/O   Final Result         1. No acute intracranial abnormality. No evidence of acute intracranial hemorrhage or mass lesion.               DX-THORACIC SPINE-WITH SWIMMERS VIEW   Final Result      No acute thoracic spine fracture or subluxation identified.      DX-CHEST-PORTABLE (1 VIEW)   Final Result      No acute cardiac or pulmonary abnormality is noted.           Assessment/Plan  * Orthostatic hypotension- (present on admission)   Assessment & Plan    Florinef, Midodrine     Suicidal ideation   Assessment & Plan    Prozac Risperdal  1:1 sitter     Fall from ground level- (present on admission)   Assessment & Plan    PT/OT     Syncope- (present on admission)   Assessment & Plan    Autonomic dysfunction?       Debility- (present on admission)   Assessment & Plan    PT/OT     Acute respiratory failure with hypoxia (HCC)- (present on admission)   Assessment & Plan    Resolved     Lumbar compression fracture (HCC)- (present on admission)   Assessment & Plan    TLSO brace   pain control     Psychogenic nonepileptic seizure- (present on admission)   Assessment & Plan    no treatment indicated.     Chronic diastolic heart failure (HCC)- (present on admission)   Assessment & Plan    Off Lisinopril and Lasix     Venous stasis dermatitis of both lower extremities- (present on admission)   Assessment & Plan    Compression     Bilateral leg edema- (present on admission)   Assessment & Plan    compression     Vitamin B12 deficiency- (present on admission)   Assessment & Plan    oral B12     Paroxysmal A-fib (HCC)- (present on admission)   Assessment & Plan    Amiodarone, ASA  [id number] - 3     Essential hypertension- (present on admission)   Assessment & Plan    Off Lisinopril     Mixed hyperlipidemia- (present on admission)   Assessment & Plan    Pravastatin       Type 2 diabetes mellitus, without long-term current  use of insulin, with peripheral neuropathy (HCC)- (present on admission)   Assessment & Plan    Diet controlled     Hyponatremia- (present on admission)   Assessment & Plan    Salt tabs  CMP pending          VTE prophylaxis: Lovenox

## 2019-02-09 NOTE — CARE PLAN
Problem: Communication  Goal: The ability to communicate needs accurately and effectively will improve  Outcome: PROGRESSING AS EXPECTED  Pt verbalizes needs appropriately.     Problem: Psychosocial Needs:  Goal: Level of anxiety will decrease  Outcome: PROGRESSING SLOWER THAN EXPECTED  Pt expressing current S/I with [place] reassessment needed Q shift.

## 2019-02-09 NOTE — CARE PLAN
Problem: Pain Management  Goal: Pain level will decrease to patient's comfort goal  Outcome: PROGRESSING AS EXPECTED  Pt on PRN management for pain, tylenol and oxy administered this am for pain. Will monitor and reassess

## 2019-02-09 NOTE — PROGRESS NOTES
Received bedside report and assumed pt care at 1900. Pt resting with no signs of acute distress. VSS. A&Ox4. Assessment complete and labs reviewed. Sitter card verified. Updated whiteboard and discussed POC with pt. Call light within reach. No further needs at this time. Will continue hourly rounding.

## 2019-02-10 PROCEDURE — A9270 NON-COVERED ITEM OR SERVICE: HCPCS | Performed by: FAMILY MEDICINE

## 2019-02-10 PROCEDURE — A9270 NON-COVERED ITEM OR SERVICE: HCPCS | Performed by: PSYCHIATRY & NEUROLOGY

## 2019-02-10 PROCEDURE — 700102 HCHG RX REV CODE 250 W/ 637 OVERRIDE(OP): Performed by: HOSPITALIST

## 2019-02-10 PROCEDURE — 700102 HCHG RX REV CODE 250 W/ 637 OVERRIDE(OP): Performed by: PSYCHIATRY & NEUROLOGY

## 2019-02-10 PROCEDURE — 700102 HCHG RX REV CODE 250 W/ 637 OVERRIDE(OP): Performed by: FAMILY MEDICINE

## 2019-02-10 PROCEDURE — A9270 NON-COVERED ITEM OR SERVICE: HCPCS | Performed by: HOSPITALIST

## 2019-02-10 PROCEDURE — G0378 HOSPITAL OBSERVATION PER HR: HCPCS

## 2019-02-10 PROCEDURE — 700102 HCHG RX REV CODE 250 W/ 637 OVERRIDE(OP): Performed by: INTERNAL MEDICINE

## 2019-02-10 PROCEDURE — 700111 HCHG RX REV CODE 636 W/ 250 OVERRIDE (IP): Performed by: FAMILY MEDICINE

## 2019-02-10 PROCEDURE — A9270 NON-COVERED ITEM OR SERVICE: HCPCS | Performed by: INTERNAL MEDICINE

## 2019-02-10 PROCEDURE — 99225 PR SUBSEQUENT OBSERVATION CARE,LEVEL II: CPT | Performed by: FAMILY MEDICINE

## 2019-02-10 RX ORDER — FLUDROCORTISONE ACETATE 0.1 MG/1
0.2 TABLET ORAL EVERY MORNING
Status: DISCONTINUED | OUTPATIENT
Start: 2019-02-11 | End: 2019-02-27 | Stop reason: HOSPADM

## 2019-02-10 RX ADMIN — PYRIDOSTIGMINE BROMIDE 90 MG: 60 TABLET ORAL at 11:31

## 2019-02-10 RX ADMIN — PYRIDOSTIGMINE BROMIDE 90 MG: 60 TABLET ORAL at 06:28

## 2019-02-10 RX ADMIN — Medication 400 MG: at 06:15

## 2019-02-10 RX ADMIN — SODIUM CHLORIDE TAB 1 GM 1 G: 1 TAB at 11:30

## 2019-02-10 RX ADMIN — PYRIDOSTIGMINE BROMIDE 90 MG: 60 TABLET ORAL at 18:40

## 2019-02-10 RX ADMIN — SODIUM CHLORIDE TAB 1 GM 1 G: 1 TAB at 18:36

## 2019-02-10 RX ADMIN — PRAVASTATIN SODIUM 20 MG: 20 TABLET ORAL at 18:36

## 2019-02-10 RX ADMIN — FLUDROCORTISONE ACETATE 0.3 MG: 0.1 TABLET ORAL at 06:30

## 2019-02-10 RX ADMIN — FLUOXETINE HYDROCHLORIDE 60 MG: 20 CAPSULE ORAL at 06:16

## 2019-02-10 RX ADMIN — MULTIPLE VITAMINS W/ MINERALS TAB 1 TABLET: TAB at 06:27

## 2019-02-10 RX ADMIN — METOCLOPRAMIDE HYDROCHLORIDE 10 MG: 10 TABLET ORAL at 06:15

## 2019-02-10 RX ADMIN — SODIUM CHLORIDE TAB 1 GM 1 G: 1 TAB at 06:28

## 2019-02-10 RX ADMIN — METOCLOPRAMIDE HYDROCHLORIDE 10 MG: 10 TABLET ORAL at 11:31

## 2019-02-10 RX ADMIN — ENOXAPARIN SODIUM 40 MG: 100 INJECTION SUBCUTANEOUS at 06:00

## 2019-02-10 RX ADMIN — METOCLOPRAMIDE HYDROCHLORIDE 10 MG: 10 TABLET ORAL at 18:36

## 2019-02-10 RX ADMIN — OXYCODONE HYDROCHLORIDE 5 MG: 5 TABLET ORAL at 11:31

## 2019-02-10 RX ADMIN — RISPERIDONE 0.5 MG: 0.5 TABLET ORAL at 06:15

## 2019-02-10 RX ADMIN — SENNOSIDES AND DOCUSATE SODIUM 2 TABLET: 8.6; 5 TABLET ORAL at 06:15

## 2019-02-10 RX ADMIN — AMIODARONE HYDROCHLORIDE 50 MG: 200 TABLET ORAL at 06:16

## 2019-02-10 RX ADMIN — ASPIRIN 81 MG 81 MG: 81 TABLET ORAL at 06:15

## 2019-02-10 RX ADMIN — RISPERIDONE 0.5 MG: 0.5 TABLET ORAL at 18:36

## 2019-02-10 RX ADMIN — CYANOCOBALAMIN TAB 500 MCG 1000 MCG: 500 TAB at 06:15

## 2019-02-10 RX ADMIN — MIDODRINE HYDROCHLORIDE 10 MG: 5 TABLET ORAL at 11:30

## 2019-02-10 RX ADMIN — MIDODRINE HYDROCHLORIDE 10 MG: 5 TABLET ORAL at 18:36

## 2019-02-10 RX ADMIN — ACETAMINOPHEN 650 MG: 325 TABLET, FILM COATED ORAL at 11:30

## 2019-02-10 RX ADMIN — OXYCODONE HYDROCHLORIDE AND ACETAMINOPHEN 500 MG: 500 TABLET ORAL at 06:17

## 2019-02-10 ASSESSMENT — ENCOUNTER SYMPTOMS
BACK PAIN: 0
SHORTNESS OF BREATH: 0
FEVER: 0
ABDOMINAL PAIN: 0
DIZZINESS: 1
VOMITING: 0
BLURRED VISION: 0
SORE THROAT: 0
FOCAL WEAKNESS: 0
WEAKNESS: 1
NAUSEA: 0
NERVOUS/ANXIOUS: 0
DIARRHEA: 0
COUGH: 0
HEARTBURN: 0
WHEEZING: 0
HEADACHES: 0
CHILLS: 0

## 2019-02-10 NOTE — PROGRESS NOTES
Received bedside report and assumed pt care at 1900. Pt resting with no signs of acute distress. VSS. A&Ox4. Assessment complete and labs reviewed. Updated whiteboard and discussed POC with pt. Fall prevention in place. Call light within reach. No further needs at this time. Will continue hourly rounding.

## 2019-02-10 NOTE — CARE PLAN
Problem: Safety  Goal: Will remain free from falls  Outcome: PROGRESSING AS EXPECTED  Pt ambulated on day shift and calls appropriately when needed    Problem: Infection  Goal: Will remain free from infection  Outcome: PROGRESSING AS EXPECTED  No s/sx of infection present

## 2019-02-10 NOTE — PROGRESS NOTES
Hospital Medicine Daily Progress Note    Date of Service  2/10/2019    Chief Complaint  53 y.o. male admitted 12/28/2018 with Syncope    Hospital Course  Admitted with Syncope, noted to have L1 compression fracture, work up negative except for orthostatic hypotension.  He then started complaining of suicidal thoughts.    Interval Problem Update  Orthostatic hypotension - sbp 125-170  Suicidal - feels that his thoughts are down to 50%   Hypertension - sbp 125-170  L1 fracture - pain controlled    Consultants/Specialty  Psychiatry  Neurosurgery signed off    Code Status  Full    Disposition  Legal hold    Review of Systems  Review of Systems   Constitutional: Positive for malaise/fatigue. Negative for chills and fever.   HENT: Negative for hearing loss and sore throat.    Eyes: Negative for blurred vision.   Respiratory: Negative for cough, shortness of breath and wheezing.    Cardiovascular: Positive for leg swelling. Negative for chest pain.   Gastrointestinal: Negative for abdominal pain, diarrhea, heartburn, nausea and vomiting.   Genitourinary: Negative for dysuria.   Musculoskeletal: Negative for back pain.   Skin: Negative for rash.   Neurological: Positive for dizziness and weakness. Negative for focal weakness and headaches.   Psychiatric/Behavioral: Positive for suicidal ideas. The patient is not nervous/anxious.         Physical Exam  Temp:  [36.2 °C (97.2 °F)-37 °C (98.6 °F)] 37 °C (98.6 °F)  Pulse:  [63-73] 73  Resp:  [17-18] 17  BP: (125-170)/(70-85) 125/70  SpO2:  [93 %-94 %] 93 %    Physical Exam   Constitutional: He is oriented to person, place, and time. He appears well-developed.   HENT:   Head: Normocephalic and atraumatic.   Eyes: Pupils are equal, round, and reactive to light. Conjunctivae are normal.   Neck: No tracheal deviation present. No thyromegaly present.   Cardiovascular: Normal rate and regular rhythm.    Pulmonary/Chest: Effort normal and breath sounds normal.   Abdominal: Soft. Bowel  sounds are normal. He exhibits no distension. There is no tenderness.   Musculoskeletal: He exhibits edema.   Right 1st toe amputation   Lymphadenopathy:     He has no cervical adenopathy.   Neurological: He is alert and oriented to person, place, and time. No cranial nerve deficit.   MMT 5/5   Skin:   Venous stasis both legs   Nursing note and vitals reviewed.      Fluids    Intake/Output Summary (Last 24 hours) at 02/10/19 1212  Last data filed at 02/10/19 0940   Gross per 24 hour   Intake              540 ml   Output             1750 ml   Net            -1210 ml       Laboratory  Recent Labs      02/09/19   1232   WBC  7.9   RBC  4.90   HEMOGLOBIN  14.0   HEMATOCRIT  42.9   MCV  87.6   MCH  28.6   MCHC  32.6*   RDW  47.1   PLATELETCT  186   MPV  10.1     Recent Labs      02/09/19   1232   SODIUM  132*   POTASSIUM  4.1   CHLORIDE  103   CO2  26   GLUCOSE  107*   BUN  27*   CREATININE  0.70   CALCIUM  9.4                   Imaging  DX-HIP-UNILATERAL-WITH PELVIS-1 VIEW RIGHT   Final Result      No radiographic evidence of acute traumatic injury right hip.      DX-CHEST-PORTABLE (1 VIEW)   Final Result      Hypoinflation without other evidence for acute cardiopulmonary disease.      MR-BRAIN-W/O   Final Result      1.  MRI of the brain without contrast within normal limits for age with mild white matter changes.   2.  Chronic appearing sinus disease   3.  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 This does not have an aggressive appearance.      US-EXTREMITY VENOUS LOWER BILAT   Final Result      CT-HAND WITH RIGHT   Final Result      1.  Edema in the dorsum of the forearm and hand without a discrete fluid collection to suggest abscess is identified.      US-CAROTID DOPPLER BILAT   Final Result      DX-LUMBAR SPINE-2 OR 3 VIEWS   Final Result         Stable compression deformity in the L1 vertebral body.      CT-CTA CHEST PULMONARY ARTERY W/ RECONS   Final Result          1. No CT evidence of pulmonary embolism.      2. Old right rib fractures.      3. Cholelithiasis. Enlarged spleen with granulomas.      4. Mild superior endplate compression deformity of L1 could be acute. Correlate with point tenderness. No retropulsion. No malalignment.      CT-CSPINE WITHOUT PLUS RECONS   Final Result         1. No acute fracture from C1 through T3 is visualized.         CT-HEAD W/O   Final Result         1. No acute intracranial abnormality. No evidence of acute intracranial hemorrhage or mass lesion.               DX-THORACIC SPINE-WITH SWIMMERS VIEW   Final Result      No acute thoracic spine fracture or subluxation identified.      DX-CHEST-PORTABLE (1 VIEW)   Final Result      No acute cardiac or pulmonary abnormality is noted.           Assessment/Plan  * Orthostatic hypotension- (present on admission)   Assessment & Plan    Decrease Florinef to 0.2 mg  Midodrine adjusted parameters (Hold for SBP > 150)     Suicidal ideation   Assessment & Plan    Prozac, Risperdal  1:1 sitter     Fall from ground level- (present on admission)   Assessment & Plan    PT/OT     Syncope- (present on admission)   Assessment & Plan    Autonomic dysfunction?       Debility- (present on admission)   Assessment & Plan    PT/OT     Acute respiratory failure with hypoxia (HCC)- (present on admission)   Assessment & Plan    Resolved     Lumbar compression fracture (HCC)- (present on admission)   Assessment & Plan    TLSO brace   pain control     Psychogenic nonepileptic seizure- (present on admission)   Assessment & Plan    no treatment indicated.     Chronic diastolic heart failure (HCC)- (present on admission)   Assessment & Plan    Off Lisinopril and Lasix     Venous stasis dermatitis of both lower extremities- (present on admission)   Assessment & Plan    Compression     Bilateral leg edema- (present on admission)   Assessment & Plan    compression     Vitamin B12 deficiency- (present on admission)   Assessment  & Plan    oral B12     Paroxysmal A-fib (HCC)- (present on admission)   Assessment & Plan    Amiodarone, ASA  [id number] - 3     Essential hypertension- (present on admission)   Assessment & Plan    Off Lisinopril     Mixed hyperlipidemia- (present on admission)   Assessment & Plan    Pravastatin       Type 2 diabetes mellitus, without long-term current use of insulin, with peripheral neuropathy (HCC)- (present on admission)   Assessment & Plan    Diet controlled     Hyponatremia- (present on admission)   Assessment & Plan    Salt tabs, follow bmp          VTE prophylaxis: Lovenox

## 2019-02-10 NOTE — CARE PLAN
Problem: Safety  Goal: Will remain free from injury    Intervention: Provide assistance with mobility  Pt calls appropriately for assistance

## 2019-02-11 PROCEDURE — 700102 HCHG RX REV CODE 250 W/ 637 OVERRIDE(OP): Performed by: PSYCHIATRY & NEUROLOGY

## 2019-02-11 PROCEDURE — A9270 NON-COVERED ITEM OR SERVICE: HCPCS | Performed by: INTERNAL MEDICINE

## 2019-02-11 PROCEDURE — 700102 HCHG RX REV CODE 250 W/ 637 OVERRIDE(OP): Performed by: FAMILY MEDICINE

## 2019-02-11 PROCEDURE — 700102 HCHG RX REV CODE 250 W/ 637 OVERRIDE(OP): Performed by: HOSPITALIST

## 2019-02-11 PROCEDURE — A9270 NON-COVERED ITEM OR SERVICE: HCPCS | Performed by: FAMILY MEDICINE

## 2019-02-11 PROCEDURE — A9270 NON-COVERED ITEM OR SERVICE: HCPCS | Performed by: HOSPITALIST

## 2019-02-11 PROCEDURE — G0378 HOSPITAL OBSERVATION PER HR: HCPCS

## 2019-02-11 PROCEDURE — 99214 OFFICE O/P EST MOD 30 MIN: CPT | Performed by: PSYCHIATRY & NEUROLOGY

## 2019-02-11 PROCEDURE — 700102 HCHG RX REV CODE 250 W/ 637 OVERRIDE(OP): Performed by: INTERNAL MEDICINE

## 2019-02-11 PROCEDURE — 700111 HCHG RX REV CODE 636 W/ 250 OVERRIDE (IP): Performed by: FAMILY MEDICINE

## 2019-02-11 PROCEDURE — 99225 PR SUBSEQUENT OBSERVATION CARE,LEVEL II: CPT | Performed by: FAMILY MEDICINE

## 2019-02-11 PROCEDURE — A9270 NON-COVERED ITEM OR SERVICE: HCPCS | Performed by: PSYCHIATRY & NEUROLOGY

## 2019-02-11 RX ADMIN — AMIODARONE HYDROCHLORIDE 50 MG: 200 TABLET ORAL at 04:33

## 2019-02-11 RX ADMIN — OXYCODONE HYDROCHLORIDE 5 MG: 5 TABLET ORAL at 07:12

## 2019-02-11 RX ADMIN — ACETAMINOPHEN 650 MG: 325 TABLET, FILM COATED ORAL at 07:12

## 2019-02-11 RX ADMIN — MULTIPLE VITAMINS W/ MINERALS TAB 1 TABLET: TAB at 04:31

## 2019-02-11 RX ADMIN — OXYCODONE HYDROCHLORIDE AND ACETAMINOPHEN 500 MG: 500 TABLET ORAL at 04:34

## 2019-02-11 RX ADMIN — FLUOXETINE HYDROCHLORIDE 60 MG: 20 CAPSULE ORAL at 04:34

## 2019-02-11 RX ADMIN — MIDODRINE HYDROCHLORIDE 10 MG: 5 TABLET ORAL at 12:06

## 2019-02-11 RX ADMIN — ENOXAPARIN SODIUM 40 MG: 100 INJECTION SUBCUTANEOUS at 04:30

## 2019-02-11 RX ADMIN — RISPERIDONE 0.5 MG: 0.5 TABLET ORAL at 04:34

## 2019-02-11 RX ADMIN — PRAVASTATIN SODIUM 20 MG: 20 TABLET ORAL at 17:11

## 2019-02-11 RX ADMIN — PYRIDOSTIGMINE BROMIDE 90 MG: 60 TABLET ORAL at 12:06

## 2019-02-11 RX ADMIN — SODIUM CHLORIDE TAB 1 GM 1 G: 1 TAB at 17:11

## 2019-02-11 RX ADMIN — OXYCODONE HYDROCHLORIDE 5 MG: 5 TABLET ORAL at 17:12

## 2019-02-11 RX ADMIN — PYRIDOSTIGMINE BROMIDE 90 MG: 60 TABLET ORAL at 04:31

## 2019-02-11 RX ADMIN — RISPERIDONE 0.5 MG: 0.5 TABLET ORAL at 17:11

## 2019-02-11 RX ADMIN — ASPIRIN 81 MG 81 MG: 81 TABLET ORAL at 04:32

## 2019-02-11 RX ADMIN — SODIUM CHLORIDE TAB 1 GM 1 G: 1 TAB at 12:06

## 2019-02-11 RX ADMIN — Medication 400 MG: at 04:32

## 2019-02-11 RX ADMIN — METOCLOPRAMIDE HYDROCHLORIDE 10 MG: 10 TABLET ORAL at 04:33

## 2019-02-11 RX ADMIN — SODIUM CHLORIDE TAB 1 GM 1 G: 1 TAB at 07:12

## 2019-02-11 RX ADMIN — ACETAMINOPHEN 650 MG: 325 TABLET, FILM COATED ORAL at 17:11

## 2019-02-11 RX ADMIN — MIDODRINE HYDROCHLORIDE 10 MG: 5 TABLET ORAL at 17:11

## 2019-02-11 RX ADMIN — PYRIDOSTIGMINE BROMIDE 90 MG: 60 TABLET ORAL at 18:00

## 2019-02-11 RX ADMIN — METOCLOPRAMIDE HYDROCHLORIDE 10 MG: 10 TABLET ORAL at 12:06

## 2019-02-11 RX ADMIN — CYANOCOBALAMIN TAB 500 MCG 1000 MCG: 500 TAB at 04:32

## 2019-02-11 RX ADMIN — FLUDROCORTISONE ACETATE 0.2 MG: 0.1 TABLET ORAL at 04:34

## 2019-02-11 RX ADMIN — SENNOSIDES AND DOCUSATE SODIUM 2 TABLET: 8.6; 5 TABLET ORAL at 17:10

## 2019-02-11 RX ADMIN — METOCLOPRAMIDE HYDROCHLORIDE 10 MG: 10 TABLET ORAL at 17:11

## 2019-02-11 ASSESSMENT — ENCOUNTER SYMPTOMS
VOMITING: 0
HEARTBURN: 0
BACK PAIN: 0
BLURRED VISION: 0
HEADACHES: 0
COUGH: 0
DIZZINESS: 1
FOCAL WEAKNESS: 0
WHEEZING: 0
CHILLS: 0
WEAKNESS: 1
ABDOMINAL PAIN: 0
SHORTNESS OF BREATH: 0
DIARRHEA: 0
NAUSEA: 0
SORE THROAT: 0
NERVOUS/ANXIOUS: 0
FEVER: 0

## 2019-02-11 NOTE — PROGRESS NOTES
Hospital Medicine Daily Progress Note    Date of Service  2/11/2019    Chief Complaint  53 y.o. male admitted 12/28/2018 with Syncope    Hospital Course  Admitted with Syncope, noted to have L1 compression fracture, work up negative except for orthostatic hypotension.  He then started complaining of suicidal thoughts.    Interval Problem Update  Orthostatic hypotension - sbp 140-160s  Suicidal - feels that his thoughts are down to 40%, discussed case with psychiatry  Hypertension - sbp 140-160s  L1 fracture - pain controlled    Consultants/Specialty  Psychiatry  Neurosurgery signed off    Code Status  Full    Disposition  Legal hold    Review of Systems  Review of Systems   Constitutional: Positive for malaise/fatigue. Negative for chills and fever.   HENT: Negative for hearing loss and sore throat.    Eyes: Negative for blurred vision.   Respiratory: Negative for cough, shortness of breath and wheezing.    Cardiovascular: Positive for leg swelling. Negative for chest pain.   Gastrointestinal: Negative for abdominal pain, diarrhea, heartburn, nausea and vomiting.   Genitourinary: Negative for dysuria.   Musculoskeletal: Negative for back pain.   Skin: Negative for rash.   Neurological: Positive for dizziness and weakness. Negative for focal weakness and headaches.   Psychiatric/Behavioral: Positive for suicidal ideas. The patient is not nervous/anxious.         Physical Exam  Temp:  [36.6 °C (97.8 °F)-37.2 °C (98.9 °F)] 36.8 °C (98.3 °F)  Pulse:  [60-98] 72  Resp:  [17-19] 18  BP: (125-163)/(58-78) 158/58  SpO2:  [91 %-98 %] 91 %    Physical Exam   Constitutional: He is oriented to person, place, and time. He appears well-developed.   HENT:   Head: Normocephalic and atraumatic.   Eyes: Pupils are equal, round, and reactive to light. Conjunctivae are normal.   Neck: No tracheal deviation present. No thyromegaly present.   Cardiovascular: Normal rate and regular rhythm.    Pulmonary/Chest: Effort normal and breath  sounds normal.   Abdominal: Soft. Bowel sounds are normal. He exhibits no distension. There is no tenderness.   Musculoskeletal: He exhibits edema.   Right 1st toe amputation   Lymphadenopathy:     He has no cervical adenopathy.   Neurological: He is alert and oriented to person, place, and time. No cranial nerve deficit.   MMT 5/5   Skin:   Venous stasis both legs   Nursing note and vitals reviewed.      Fluids    Intake/Output Summary (Last 24 hours) at 02/11/19 1040  Last data filed at 02/11/19 0859   Gross per 24 hour   Intake              835 ml   Output              475 ml   Net              360 ml       Laboratory  Recent Labs      02/09/19   1232   WBC  7.9   RBC  4.90   HEMOGLOBIN  14.0   HEMATOCRIT  42.9   MCV  87.6   MCH  28.6   MCHC  32.6*   RDW  47.1   PLATELETCT  186   MPV  10.1     Recent Labs      02/09/19   1232   SODIUM  132*   POTASSIUM  4.1   CHLORIDE  103   CO2  26   GLUCOSE  107*   BUN  27*   CREATININE  0.70   CALCIUM  9.4                   Imaging  DX-HIP-UNILATERAL-WITH PELVIS-1 VIEW RIGHT   Final Result      No radiographic evidence of acute traumatic injury right hip.      DX-CHEST-PORTABLE (1 VIEW)   Final Result      Hypoinflation without other evidence for acute cardiopulmonary disease.      MR-BRAIN-W/O   Final Result      1.  MRI of the brain without contrast within normal limits for age with mild white matter changes.   2.  Chronic appearing sinus disease   3.  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 This does not have an aggressive appearance.      US-EXTREMITY VENOUS LOWER BILAT   Final Result      CT-HAND WITH RIGHT   Final Result      1.  Edema in the dorsum of the forearm and hand without a discrete fluid collection to suggest abscess is identified.      US-CAROTID DOPPLER BILAT   Final Result      DX-LUMBAR SPINE-2 OR 3 VIEWS   Final Result         Stable compression deformity in the L1 vertebral body.      CT-CTA CHEST  PULMONARY ARTERY W/ RECONS   Final Result         1. No CT evidence of pulmonary embolism.      2. Old right rib fractures.      3. Cholelithiasis. Enlarged spleen with granulomas.      4. Mild superior endplate compression deformity of L1 could be acute. Correlate with point tenderness. No retropulsion. No malalignment.      CT-CSPINE WITHOUT PLUS RECONS   Final Result         1. No acute fracture from C1 through T3 is visualized.         CT-HEAD W/O   Final Result         1. No acute intracranial abnormality. No evidence of acute intracranial hemorrhage or mass lesion.               DX-THORACIC SPINE-WITH SWIMMERS VIEW   Final Result      No acute thoracic spine fracture or subluxation identified.      DX-CHEST-PORTABLE (1 VIEW)   Final Result      No acute cardiac or pulmonary abnormality is noted.           Assessment/Plan  * Orthostatic hypotension- (present on admission)   Assessment & Plan    Decreased Florinef to 0.2 mg  Midodrine adjusted parameters (Hold for SBP > 150)     Suicidal ideation   Assessment & Plan    Prozac, Risperdal  1:1 sitter     Fall from ground level- (present on admission)   Assessment & Plan    PT/OT     Syncope- (present on admission)   Assessment & Plan    Autonomic dysfunction?       Debility- (present on admission)   Assessment & Plan    PT/OT     Acute respiratory failure with hypoxia (HCC)- (present on admission)   Assessment & Plan    Resolved     Lumbar compression fracture (HCC)- (present on admission)   Assessment & Plan    TLSO brace   pain control     Psychogenic nonepileptic seizure- (present on admission)   Assessment & Plan    no treatment indicated.     Chronic diastolic heart failure (HCC)- (present on admission)   Assessment & Plan    Off Lisinopril and Lasix     Venous stasis dermatitis of both lower extremities- (present on admission)   Assessment & Plan    Compression     Bilateral leg edema- (present on admission)   Assessment & Plan    compression     Vitamin B12  deficiency- (present on admission)   Assessment & Plan    oral B12     Paroxysmal A-fib (HCC)- (present on admission)   Assessment & Plan    Amiodarone, ASA  [id number] - 3     Essential hypertension- (present on admission)   Assessment & Plan    Off Lisinopril     Mixed hyperlipidemia- (present on admission)   Assessment & Plan    Pravastatin       Type 2 diabetes mellitus, without long-term current use of insulin, with peripheral neuropathy (HCC)- (present on admission)   Assessment & Plan    Diet controlled     Hyponatremia- (present on admission)   Assessment & Plan    Salt tabs, follow bmp          VTE prophylaxis: Lovenox

## 2019-02-11 NOTE — PSYCHIATRY
"PSYCHIATRIC FOLLOW UP:    Reason for consult: si  Consulting phsyician: Slime      HPI:     Sebastián Castro is a 53 y.o. year old male with a PMH of CHF, a fib, DM, HTN,  a fib s/p cardioversion, orthostatic hypotentison with depressed mood, PTSD, and some functional seizures. He was also noted to have an L1 compression fracture.     Received call over the weekend that he was having trouble with the entire dose of risperdal at once. He thought it was making him have \"worse thoughts\", which is strange as it had been helping. Seems like it may possibly have been nightmares. Split the dose to .5mg po bid. No more complaints to nursing since the medication change. In fact, he has been telling nursing that he thinks the medication is working well and he is feeling better. He has been saying this consistently. To me, he states he is more anxious and asking for a medication change again. There aren't many antipsychotics that won't give him orthostatic hypotension, so I am hesitant to change it. He reports he is hearing voices. He has not been reporting this to nursing staff. He still endorses si. His overall presentation is odd and has been inconsistent.       Psychiatric Examination: observed phenomenon:  Vitals: /58   Pulse 72   Temp 36.8 °C (98.3 °F) (Temporal)   Resp 18   Ht 1.829 m (6')   Wt 118.6 kg (261 lb 7.5 oz)   SpO2 91%   BMI 35.46 kg/m²  Body mass index is 35.46 kg/m².  Musculoskeletal no psychomotor agitatio or retardation.   Appearance: Grooming wnl   Thoughts Process: Logical and sequential, goal-directed   Thought Content: +ah; no evidence of delusions, no ideas of reference, no internal stimulation noted   Speech: Nl tone, rate, and volume. Not pressured. Understandable.   Mood:    depressed  Affect:    Full, not congruent, consistently smiling, sometimes laughing   SI/HI:   Reports si.   Attention/Alertness:   Awake, alert. Attention wnl   Memory:    Grossly intact.   Orientation:    " Grossly intact.   Cognition:Grossly intact.   Insight: decreased   Judgement:  Decreased   Neurological Testing:    Medical systems reviewed:   Eyes: no blurry vision no redness   Skin:no rash   CV:no cp no palpitations   Lungs:no sob   Neuro: weakness  GI:no n/v, no diarrhea  :no dysuria   Endocrine:hypotentsion   Psych:see hpi   Musculoskeletal:   All other systems reviewed and are negative.       Soc history:      Lab results/tests:   Recent Results (from the past 48 hour(s))   CBC WITH DIFFERENTIAL    Collection Time: 02/09/19 12:32 PM   Result Value Ref Range    WBC 7.9 4.8 - 10.8 K/uL    RBC 4.90 4.70 - 6.10 M/uL    Hemoglobin 14.0 14.0 - 18.0 g/dL    Hematocrit 42.9 42.0 - 52.0 %    MCV 87.6 81.4 - 97.8 fL    MCH 28.6 27.0 - 33.0 pg    MCHC 32.6 (L) 33.7 - 35.3 g/dL    RDW 47.1 35.9 - 50.0 fL    Platelet Count 186 164 - 446 K/uL    MPV 10.1 9.0 - 12.9 fL    Neutrophils-Polys 58.40 44.00 - 72.00 %    Lymphocytes 29.40 22.00 - 41.00 %    Monocytes 7.50 0.00 - 13.40 %    Eosinophils 4.20 0.00 - 6.90 %    Basophils 0.40 0.00 - 1.80 %    Immature Granulocytes 0.10 0.00 - 0.90 %    Nucleated RBC 0.00 /100 WBC    Neutrophils (Absolute) 4.59 1.82 - 7.42 K/uL    Lymphs (Absolute) 2.31 1.00 - 4.80 K/uL    Monos (Absolute) 0.59 0.00 - 0.85 K/uL    Eos (Absolute) 0.33 0.00 - 0.51 K/uL    Baso (Absolute) 0.03 0.00 - 0.12 K/uL    Immature Granulocytes (abs) 0.01 0.00 - 0.11 K/uL    NRBC (Absolute) 0.00 K/uL   Comp Metabolic Panel    Collection Time: 02/09/19 12:32 PM   Result Value Ref Range    Sodium 132 (L) 135 - 145 mmol/L    Potassium 4.1 3.6 - 5.5 mmol/L    Chloride 103 96 - 112 mmol/L    Co2 26 20 - 33 mmol/L    Anion Gap 3.0 0.0 - 11.9    Glucose 107 (H) 65 - 99 mg/dL    Bun 27 (H) 8 - 22 mg/dL    Creatinine 0.70 0.50 - 1.40 mg/dL    Calcium 9.4 8.5 - 10.5 mg/dL    AST(SGOT) 19 12 - 45 U/L    ALT(SGPT) 21 2 - 50 U/L    Alkaline Phosphatase 65 30 - 99 U/L    Total Bilirubin 0.4 0.1 - 1.5 mg/dL    Albumin 3.7 3.2 -  4.9 g/dL    Total Protein 6.6 6.0 - 8.2 g/dL    Globulin 2.9 1.9 - 3.5 g/dL    A-G Ratio 1.3 g/dL   ESTIMATED GFR    Collection Time: 02/09/19 12:32 PM   Result Value Ref Range    GFR If African American >60 >60 mL/min/1.73 m 2    GFR If Non African American >60 >60 mL/min/1.73 m 2     DX-HIP-UNILATERAL-WITH PELVIS-1 VIEW RIGHT   Final Result      No radiographic evidence of acute traumatic injury right hip.      DX-CHEST-PORTABLE (1 VIEW)   Final Result      Hypoinflation without other evidence for acute cardiopulmonary disease.      MR-BRAIN-W/O   Final Result      1.  MRI of the brain without contrast within normal limits for age with mild white matter changes.   2.  Chronic appearing sinus disease   3.  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 This does not have an aggressive appearance.      US-EXTREMITY VENOUS LOWER BILAT   Final Result      CT-HAND WITH RIGHT   Final Result      1.  Edema in the dorsum of the forearm and hand without a discrete fluid collection to suggest abscess is identified.      US-CAROTID DOPPLER BILAT   Final Result      DX-LUMBAR SPINE-2 OR 3 VIEWS   Final Result         Stable compression deformity in the L1 vertebral body.      CT-CTA CHEST PULMONARY ARTERY W/ RECONS   Final Result         1. No CT evidence of pulmonary embolism.      2. Old right rib fractures.      3. Cholelithiasis. Enlarged spleen with granulomas.      4. Mild superior endplate compression deformity of L1 could be acute. Correlate with point tenderness. No retropulsion. No malalignment.      CT-CSPINE WITHOUT PLUS RECONS   Final Result         1. No acute fracture from C1 through T3 is visualized.         CT-HEAD W/O   Final Result         1. No acute intracranial abnormality. No evidence of acute intracranial hemorrhage or mass lesion.               DX-THORACIC SPINE-WITH SWIMMERS VIEW   Final Result      No acute thoracic spine fracture or subluxation identified.       DX-CHEST-PORTABLE (1 VIEW)   Final Result      No acute cardiac or pulmonary abnormality is noted.               Assessment:  MDD, severe, without psychotic features           Plan:  legal hold: extended    Due to inconsistencies in reporting and his persistent hypotension, I am hesitant to make a change in his medication. Will continue risperdal .5mg po bid for now and will follow.

## 2019-02-11 NOTE — CARE PLAN
Problem: Safety  Goal: Will remain free from falls    Intervention: Implement fall precautions   02/10/19 5988   OTHER   Environmental Precautions Treaded Slipper Socks on Patient;Personal Belongings, Wastebasket, Call Bell etc. in Easy Reach;Report Given to Other Health Care Providers Regarding Fall Risk;Bed in Low Position   Chair/Bed Strip Alarm Yes - Alarm On         Problem: Discharge Barriers/Planning  Goal: Patient's continuum of care needs will be met    Intervention: Collaborate with Transitional Care Team and Interdisciplinary Team to meet discharge needs  Possible discharge to Psychiatric facility.

## 2019-02-11 NOTE — PROGRESS NOTES
Received alert and oriented x 4. Sitter at the bed side. Legal hold 2000 SI. Check vitals sign and recorded accordingly and due med given per MAR. Monitor sign and symptoms of respiratory distress and treatment given accordingly per MAR.Medicated per MAR and reassessed every 2 hours per protocol. Call light within reach. Bed alarm in placed. Needs attended. Will continue to monitor./78   Pulse 60   Temp 36.6 °C (97.9 °F) (Temporal)   Resp 18   Ht 1.829 m (6')   Wt 118.6 kg (261 lb 7.5 oz)   SpO2 95%   BMI 35.46 kg/m² .

## 2019-02-12 PROCEDURE — A9270 NON-COVERED ITEM OR SERVICE: HCPCS | Performed by: FAMILY MEDICINE

## 2019-02-12 PROCEDURE — 700102 HCHG RX REV CODE 250 W/ 637 OVERRIDE(OP): Performed by: PSYCHIATRY & NEUROLOGY

## 2019-02-12 PROCEDURE — G0378 HOSPITAL OBSERVATION PER HR: HCPCS

## 2019-02-12 PROCEDURE — 99225 PR SUBSEQUENT OBSERVATION CARE,LEVEL II: CPT | Performed by: INTERNAL MEDICINE

## 2019-02-12 PROCEDURE — 700102 HCHG RX REV CODE 250 W/ 637 OVERRIDE(OP): Performed by: HOSPITALIST

## 2019-02-12 PROCEDURE — 700102 HCHG RX REV CODE 250 W/ 637 OVERRIDE(OP): Performed by: FAMILY MEDICINE

## 2019-02-12 PROCEDURE — A9270 NON-COVERED ITEM OR SERVICE: HCPCS | Performed by: INTERNAL MEDICINE

## 2019-02-12 PROCEDURE — 700102 HCHG RX REV CODE 250 W/ 637 OVERRIDE(OP): Performed by: INTERNAL MEDICINE

## 2019-02-12 PROCEDURE — A9270 NON-COVERED ITEM OR SERVICE: HCPCS | Performed by: HOSPITALIST

## 2019-02-12 PROCEDURE — A9270 NON-COVERED ITEM OR SERVICE: HCPCS | Performed by: PSYCHIATRY & NEUROLOGY

## 2019-02-12 PROCEDURE — 700111 HCHG RX REV CODE 636 W/ 250 OVERRIDE (IP): Performed by: FAMILY MEDICINE

## 2019-02-12 RX ADMIN — PYRIDOSTIGMINE BROMIDE 90 MG: 60 TABLET ORAL at 05:42

## 2019-02-12 RX ADMIN — FLUOXETINE HYDROCHLORIDE 60 MG: 20 CAPSULE ORAL at 05:46

## 2019-02-12 RX ADMIN — METOCLOPRAMIDE HYDROCHLORIDE 10 MG: 10 TABLET ORAL at 10:38

## 2019-02-12 RX ADMIN — METOCLOPRAMIDE HYDROCHLORIDE 10 MG: 10 TABLET ORAL at 14:01

## 2019-02-12 RX ADMIN — RISPERIDONE 0.5 MG: 0.5 TABLET ORAL at 18:26

## 2019-02-12 RX ADMIN — MIDODRINE HYDROCHLORIDE 10 MG: 5 TABLET ORAL at 05:45

## 2019-02-12 RX ADMIN — METOCLOPRAMIDE HYDROCHLORIDE 10 MG: 10 TABLET ORAL at 18:26

## 2019-02-12 RX ADMIN — SODIUM CHLORIDE TAB 1 GM 1 G: 1 TAB at 14:01

## 2019-02-12 RX ADMIN — Medication 400 MG: at 05:44

## 2019-02-12 RX ADMIN — SENNOSIDES AND DOCUSATE SODIUM 2 TABLET: 8.6; 5 TABLET ORAL at 05:44

## 2019-02-12 RX ADMIN — PYRIDOSTIGMINE BROMIDE 90 MG: 60 TABLET ORAL at 18:26

## 2019-02-12 RX ADMIN — ACETAMINOPHEN 650 MG: 325 TABLET, FILM COATED ORAL at 14:04

## 2019-02-12 RX ADMIN — RISPERIDONE 0.5 MG: 0.5 TABLET ORAL at 05:45

## 2019-02-12 RX ADMIN — ASPIRIN 81 MG 81 MG: 81 TABLET ORAL at 05:46

## 2019-02-12 RX ADMIN — ENOXAPARIN SODIUM 40 MG: 100 INJECTION SUBCUTANEOUS at 05:42

## 2019-02-12 RX ADMIN — AMIODARONE HYDROCHLORIDE 50 MG: 200 TABLET ORAL at 05:43

## 2019-02-12 RX ADMIN — PRAVASTATIN SODIUM 20 MG: 20 TABLET ORAL at 18:26

## 2019-02-12 RX ADMIN — SODIUM CHLORIDE TAB 1 GM 1 G: 1 TAB at 10:38

## 2019-02-12 RX ADMIN — PYRIDOSTIGMINE BROMIDE 90 MG: 60 TABLET ORAL at 14:01

## 2019-02-12 RX ADMIN — FLUDROCORTISONE ACETATE 0.2 MG: 0.1 TABLET ORAL at 05:42

## 2019-02-12 RX ADMIN — MULTIPLE VITAMINS W/ MINERALS TAB 1 TABLET: TAB at 05:46

## 2019-02-12 RX ADMIN — OXYCODONE HYDROCHLORIDE AND ACETAMINOPHEN 500 MG: 500 TABLET ORAL at 05:46

## 2019-02-12 RX ADMIN — CYANOCOBALAMIN TAB 500 MCG 1000 MCG: 500 TAB at 05:43

## 2019-02-12 RX ADMIN — OXYCODONE HYDROCHLORIDE 5 MG: 5 TABLET ORAL at 10:38

## 2019-02-12 RX ADMIN — SODIUM CHLORIDE TAB 1 GM 1 G: 1 TAB at 18:26

## 2019-02-12 ASSESSMENT — ENCOUNTER SYMPTOMS
VOMITING: 0
DIZZINESS: 0
COUGH: 0
HEADACHES: 0
ABDOMINAL PAIN: 0
DIAPHORESIS: 0
MYALGIAS: 1
MEMORY LOSS: 0
WEAKNESS: 1
BACK PAIN: 0
DIARRHEA: 0

## 2019-02-12 NOTE — PROGRESS NOTES
Assumed patient care at 1900. Received Bedside report. Patient sitting in chair, sitter at bedside for 1-1 d/t to SI/legal hold, alert and oriented x 4. Discussed POC. No s/s of distress. Patient denies any further questions or concerns. Call light in reach, fall precautions in place. Continue hourly rounding. /64   Pulse 64   Temp 36.3 °C (97.3 °F) (Temporal)   Resp 18   Ht 1.829 m (6')   Wt 118.6 kg (261 lb 7.5 oz)   SpO2 92%   BMI 35.46 kg/m²

## 2019-02-12 NOTE — PROGRESS NOTES
Psychiatry paged regarding when next re-evaluation will be and if ok to de-escalate and discontinue sitter. Per patient, he states that he is still having thoughts of hurting himself. Per MD, continue with current orders including 1:1 sitter. Will re-evaluate patient tomorrow.

## 2019-02-12 NOTE — DISCHARGE PLANNING
Legal Hold     Referral: Legal Hold Court     Intervention: Pt presented for legal hold meeting with  to discuss legal options of contesting hold and meeting with the court doctors tomorrow afternoon, or not contesting legal hold and continuing the hold for one week.  advised that pt's legal hold will be be continued for one week (2/21/19).       Plan: Pt's legal hold has been continued for one week. Pt awaiting transfer to an in patient psych facility.

## 2019-02-12 NOTE — CARE PLAN
Problem: Pain Management  Goal: Pain level will decrease to patient's comfort goal    Intervention: Follow pain managment plan developed in collaboration with patient and Interdisciplinary Team  Patient complains of lower back pain-oxycodone and tylenol given with good effect. Educated patient on pain medication regimen including purpose and side effects of medication      Problem: Safety  Goal: Will remain free from injury  Patient has 1:1 sitter at bedside.  Patient tolerated ambulating down the hallways with TLSO brace in place with a FWW.  Patient educated regarding importance of calling for assistance prior to getting up, patient verbalized understanding.

## 2019-02-12 NOTE — CARE PLAN
Problem: Safety  Goal: Will remain free from falls    Intervention: Implement fall precautions   02/12/19 0321   OTHER   Environmental Precautions Treaded Slipper Socks on Patient;Personal Belongings, Wastebasket, Call Bell etc. in Easy Reach;Transferred to Stronger Side;Report Given to Other Health Care Providers Regarding Fall Risk;Bed in Low Position;Communication Sign for Patients & Families;Mobility Assessed & Appropriate Sign Placed   Bed Alarm Yes - Alarm On         Problem: Psychosocial Needs:  Goal: Level of anxiety will decrease  Outcome: PROGRESSING AS EXPECTED  Patient smiling reports decreasing thought of self harm.

## 2019-02-12 NOTE — DISCHARGE PLANNING
Agency/Facility Name: [facility]  Spoke To: Admissions  Outcome: [facility] requested [facility] to call back after 1130 as their main admissions person is in a meeting.

## 2019-02-12 NOTE — DISCHARGE PLANNING
Agency/Facility Name: [facility]  Spoke To: Lena   Outcome: Patient has two people ahead of him until referral is reviewed with admissions. JAMES Oropeza notified.

## 2019-02-13 PROCEDURE — A9270 NON-COVERED ITEM OR SERVICE: HCPCS | Performed by: HOSPITALIST

## 2019-02-13 PROCEDURE — 700102 HCHG RX REV CODE 250 W/ 637 OVERRIDE(OP): Performed by: FAMILY MEDICINE

## 2019-02-13 PROCEDURE — 99225 PR SUBSEQUENT OBSERVATION CARE,LEVEL II: CPT | Performed by: INTERNAL MEDICINE

## 2019-02-13 PROCEDURE — 700102 HCHG RX REV CODE 250 W/ 637 OVERRIDE(OP): Performed by: HOSPITALIST

## 2019-02-13 PROCEDURE — 97530 THERAPEUTIC ACTIVITIES: CPT

## 2019-02-13 PROCEDURE — A9270 NON-COVERED ITEM OR SERVICE: HCPCS | Performed by: PSYCHIATRY & NEUROLOGY

## 2019-02-13 PROCEDURE — A9270 NON-COVERED ITEM OR SERVICE: HCPCS | Performed by: FAMILY MEDICINE

## 2019-02-13 PROCEDURE — 700102 HCHG RX REV CODE 250 W/ 637 OVERRIDE(OP): Performed by: PSYCHIATRY & NEUROLOGY

## 2019-02-13 PROCEDURE — G0378 HOSPITAL OBSERVATION PER HR: HCPCS

## 2019-02-13 PROCEDURE — 700111 HCHG RX REV CODE 636 W/ 250 OVERRIDE (IP): Performed by: FAMILY MEDICINE

## 2019-02-13 PROCEDURE — A9270 NON-COVERED ITEM OR SERVICE: HCPCS | Performed by: INTERNAL MEDICINE

## 2019-02-13 PROCEDURE — 97535 SELF CARE MNGMENT TRAINING: CPT | Mod: XU

## 2019-02-13 PROCEDURE — 700102 HCHG RX REV CODE 250 W/ 637 OVERRIDE(OP): Performed by: INTERNAL MEDICINE

## 2019-02-13 RX ORDER — IBUPROFEN 800 MG/1
400 TABLET ORAL EVERY 6 HOURS PRN
Status: DISCONTINUED | OUTPATIENT
Start: 2019-02-13 | End: 2019-02-27 | Stop reason: HOSPADM

## 2019-02-13 RX ORDER — OXYCODONE HYDROCHLORIDE 5 MG/1
5 TABLET ORAL EVERY 6 HOURS PRN
Status: DISCONTINUED | OUTPATIENT
Start: 2019-02-13 | End: 2019-02-27 | Stop reason: HOSPADM

## 2019-02-13 RX ADMIN — MULTIPLE VITAMINS W/ MINERALS TAB 1 TABLET: TAB at 05:05

## 2019-02-13 RX ADMIN — CYANOCOBALAMIN TAB 500 MCG 1000 MCG: 500 TAB at 05:05

## 2019-02-13 RX ADMIN — RISPERIDONE 0.5 MG: 0.5 TABLET ORAL at 17:37

## 2019-02-13 RX ADMIN — MIDODRINE HYDROCHLORIDE 10 MG: 5 TABLET ORAL at 05:04

## 2019-02-13 RX ADMIN — OXYCODONE HYDROCHLORIDE AND ACETAMINOPHEN 500 MG: 500 TABLET ORAL at 05:05

## 2019-02-13 RX ADMIN — Medication 400 MG: at 05:04

## 2019-02-13 RX ADMIN — SODIUM CHLORIDE TAB 1 GM 1 G: 1 TAB at 12:36

## 2019-02-13 RX ADMIN — FLUOXETINE HYDROCHLORIDE 60 MG: 20 CAPSULE ORAL at 05:05

## 2019-02-13 RX ADMIN — ASPIRIN 81 MG 81 MG: 81 TABLET ORAL at 05:05

## 2019-02-13 RX ADMIN — PYRIDOSTIGMINE BROMIDE 90 MG: 60 TABLET ORAL at 17:37

## 2019-02-13 RX ADMIN — PYRIDOSTIGMINE BROMIDE 90 MG: 60 TABLET ORAL at 12:37

## 2019-02-13 RX ADMIN — PRAVASTATIN SODIUM 20 MG: 20 TABLET ORAL at 17:36

## 2019-02-13 RX ADMIN — METOCLOPRAMIDE HYDROCHLORIDE 10 MG: 10 TABLET ORAL at 12:36

## 2019-02-13 RX ADMIN — RISPERIDONE 0.5 MG: 0.5 TABLET ORAL at 05:06

## 2019-02-13 RX ADMIN — PYRIDOSTIGMINE BROMIDE 90 MG: 60 TABLET ORAL at 05:05

## 2019-02-13 RX ADMIN — ENOXAPARIN SODIUM 40 MG: 100 INJECTION SUBCUTANEOUS at 05:06

## 2019-02-13 RX ADMIN — METOCLOPRAMIDE HYDROCHLORIDE 10 MG: 10 TABLET ORAL at 05:05

## 2019-02-13 RX ADMIN — OXYCODONE HYDROCHLORIDE 5 MG: 5 TABLET ORAL at 07:48

## 2019-02-13 RX ADMIN — MIDODRINE HYDROCHLORIDE 10 MG: 5 TABLET ORAL at 12:36

## 2019-02-13 RX ADMIN — SODIUM CHLORIDE TAB 1 GM 1 G: 1 TAB at 17:36

## 2019-02-13 RX ADMIN — AMIODARONE HYDROCHLORIDE 50 MG: 200 TABLET ORAL at 05:06

## 2019-02-13 RX ADMIN — MIDODRINE HYDROCHLORIDE 10 MG: 5 TABLET ORAL at 17:37

## 2019-02-13 RX ADMIN — SODIUM CHLORIDE TAB 1 GM 1 G: 1 TAB at 07:48

## 2019-02-13 RX ADMIN — FLUDROCORTISONE ACETATE 0.2 MG: 0.1 TABLET ORAL at 05:04

## 2019-02-13 RX ADMIN — METOCLOPRAMIDE HYDROCHLORIDE 10 MG: 10 TABLET ORAL at 17:37

## 2019-02-13 ASSESSMENT — COGNITIVE AND FUNCTIONAL STATUS - GENERAL
SUGGESTED CMS G CODE MODIFIER MOBILITY: CJ
MOVING FROM LYING ON BACK TO SITTING ON SIDE OF FLAT BED: A LITTLE
CLIMB 3 TO 5 STEPS WITH RAILING: A LITTLE
MOVING TO AND FROM BED TO CHAIR: A LITTLE
MOBILITY SCORE: 21

## 2019-02-13 ASSESSMENT — ENCOUNTER SYMPTOMS
COUGH: 0
NAUSEA: 0
DIARRHEA: 0
BACK PAIN: 0
WEAKNESS: 1
CONSTIPATION: 0
CHILLS: 0
FEVER: 0
FOCAL WEAKNESS: 0
HEADACHES: 0
ABDOMINAL PAIN: 0
WHEEZING: 0
MYALGIAS: 1

## 2019-02-13 ASSESSMENT — GAIT ASSESSMENTS
DEVIATION: OTHER (COMMENT)
DISTANCE (FEET): 200
GAIT LEVEL OF ASSIST: STAND BY ASSIST

## 2019-02-13 NOTE — PROGRESS NOTES
[facility] Medicine Daily Progress Note    Date of Service  2/12/2019    Chief Complaint  53 y.o. male admitted 12/28/2018 with Syncope    Hospital Course  Admitted with Syncope, noted to have L1 compression fracture, work up negative except for orthostatic hypotension.  He then started complaining of suicidal thoughts.    Interval Problem Update  No new events, pleasant and cooperative, at nursing station    Consultants/Specialty  Psychiatry  Neurosurgery signed off    Code Status  Full    Disposition  Legal hold  1:1 sitter    Review of Systems  Review of Systems   Constitutional: Negative for diaphoresis and malaise/fatigue.   HENT: Negative for hearing loss.    Respiratory: Negative for cough.    Cardiovascular: Positive for leg swelling. Negative for chest pain.   Gastrointestinal: Negative for abdominal pain, diarrhea and vomiting.   Genitourinary: Negative for dysuria.   Musculoskeletal: Positive for myalgias. Negative for back pain.   Skin: Negative for rash.   Neurological: Positive for weakness. Negative for dizziness and headaches.   Psychiatric/Behavioral: Positive for suicidal ideas. Negative for memory loss.        Physical Exam  Temp:  [36.1 °C (97 °F)-36.8 °C (98.3 °F)] 36.6 °C (97.8 °F)  Pulse:  [60-69] 69  Resp:  [17-18] 17  BP: (111-167)/(54-88) 125/73  SpO2:  [92 %-99 %] 99 %    Physical Exam   Constitutional: He is oriented to person, place, and time. He appears well-developed. No distress.   HENT:   Head: Normocephalic and atraumatic.   Mouth/Throat: No oropharyngeal exudate.   Eyes: Pupils are equal, round, and reactive to light. EOM are normal. Right eye exhibits no discharge. Left eye exhibits no discharge.   Neck: No tracheal deviation present. No thyromegaly present.   Cardiovascular: Normal rate and regular rhythm.    Pulmonary/Chest: Effort normal and breath sounds normal. No respiratory distress. He has no wheezes.   Abdominal: Soft. Bowel sounds are normal. He exhibits no distension.  There is no tenderness.   Musculoskeletal: He exhibits edema. He exhibits no tenderness.   Right 1st toe amputation   Neurological: He is alert and oriented to person, place, and time. No cranial nerve deficit. Coordination normal.   MMT 5/5   Skin: Skin is warm and dry.   Venous stasis both legs   Psychiatric: His behavior is normal.   Nursing note and vitals reviewed.      Fluids    Intake/Output Summary (Last 24 hours) at 02/12/19 1644  Last data filed at 02/12/19 1343   Gross per 24 hour   Intake              900 ml   Output             1750 ml   Net             -850 ml       Laboratory                        Imaging  DX-HIP-UNILATERAL-WITH PELVIS-1 VIEW RIGHT   Final Result      No radiographic evidence of acute traumatic injury right hip.      DX-CHEST-PORTABLE (1 VIEW)   Final Result      Hypoinflation without other evidence for acute cardiopulmonary disease.      MR-BRAIN-W/O   Final Result      1.  MRI of the brain without contrast within normal limits for age with mild white matter changes.   2.  Chronic appearing sinus disease   3.  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 This does not have an aggressive appearance.      US-EXTREMITY VENOUS LOWER BILAT   Final Result      CT-HAND WITH RIGHT   Final Result      1.  Edema in the dorsum of the forearm and hand without a discrete fluid collection to suggest abscess is identified.      US-CAROTID DOPPLER BILAT   Final Result      DX-LUMBAR SPINE-2 OR 3 VIEWS   Final Result         Stable compression deformity in the L1 vertebral body.      CT-CTA CHEST PULMONARY ARTERY W/ RECONS   Final Result         1. No CT evidence of pulmonary embolism.      2. Old right rib fractures.      3. Cholelithiasis. Enlarged spleen with granulomas.      4. Mild superior endplate compression deformity of L1 could be acute. Correlate with point tenderness. No retropulsion. No malalignment.      CT-CSPINE WITHOUT PLUS RECONS   Final  Result         1. No acute fracture from C1 through T3 is visualized.         CT-HEAD W/O   Final Result         1. No acute intracranial abnormality. No evidence of acute intracranial hemorrhage or mass lesion.               DX-THORACIC SPINE-WITH SWIMMERS VIEW   Final Result      No acute thoracic spine fracture or subluxation identified.      DX-CHEST-PORTABLE (1 VIEW)   Final Result      No acute cardiac or pulmonary abnormality is noted.           Assessment/Plan  * Orthostatic hypotension- (present on admission)   Assessment & Plan    Decreased Florinef to 0.2 mg  Midodrine adjusted parameters (Hold for SBP > 150)     Suicidal ideation   Assessment & Plan    Prozac, Risperdal  1:1 sitter, psychiatry to reassess needs     Fall from ground level- (present on admission)   Assessment & Plan    PT/OT     Syncope- (present on admission)   Assessment & Plan    Autonomic dysfunction?       Debility- (present on admission)   Assessment & Plan    PT/OT     Acute respiratory failure with hypoxia (HCC)- (present on admission)   Assessment & Plan    Resolved     Lumbar compression fracture (HCC)- (present on admission)   Assessment & Plan    TLSO brace   pain control    Ambulating with CGA     Psychogenic nonepileptic seizure- (present on admission)   Assessment & Plan    no treatment indicated.     Chronic diastolic heart failure (HCC)- (present on admission)   Assessment & Plan    Off Lisinopril and Lasix     Venous stasis dermatitis of both lower extremities- (present on admission)   Assessment & Plan    Compression     Bilateral leg edema- (present on admission)   Assessment & Plan    compression     Vitamin B12 deficiency- (present on admission)   Assessment & Plan    oral B12     Paroxysmal A-fib (HCC)- (present on admission)   Assessment & Plan    Amiodarone, ASA  [id number] - 3     Essential hypertension- (present on admission)   Assessment & Plan    Off Lisinopril     Mixed hyperlipidemia- (present on admission)    Assessment & Plan    Pravastatin       Type 2 diabetes mellitus, without long-term current use of insulin, with peripheral neuropathy (HCC)- (present on admission)   Assessment & Plan    Diet controlled     Hyponatremia- (present on admission)   Assessment & Plan    Salt tabs, follow bmp          VTE prophylaxis: Lovenox

## 2019-02-13 NOTE — PROGRESS NOTES
[facility] Medicine Daily Progress Note    Date of Service  2/13/2019    Chief Complaint  53 y.o. male admitted 12/28/2018 with Syncope    Hospital Course  Admitted with Syncope, noted to have L1 compression fracture, work up negative except for orthostatic hypotension.  He then started complaining of suicidal thoughts.    Interval Problem Update  Patient reports minimal low back pain  Ambulating with standby assistance  Pleasant and conversant    Consultants/Specialty  Psychiatry  Neurosurgery signed off    Code Status  Full    Disposition  Legal hold  1:1 sitter  Awaiting inpatient psychiatry placement    Review of Systems  Review of Systems   Constitutional: Negative for chills, fever and malaise/fatigue.   HENT: Negative for congestion.    Respiratory: Negative for cough and wheezing.    Cardiovascular: Positive for leg swelling. Negative for chest pain.   Gastrointestinal: Negative for abdominal pain, constipation, diarrhea and nausea.   Genitourinary: Negative for dysuria.   Musculoskeletal: Positive for myalgias. Negative for back pain.   Skin: Negative for rash.   Neurological: Positive for weakness. Negative for focal weakness and headaches.   Psychiatric/Behavioral: Positive for suicidal ideas.        Physical Exam  Temp:  [36.1 °C (96.9 °F)-36.6 °C (97.9 °F)] 36.6 °C (97.9 °F)  Pulse:  [56-63] 59  Resp:  [16-19] 16  BP: (101-164)/(43-84) 164/84  SpO2:  [93 %-97 %] 93 %    Physical Exam   Constitutional: He is oriented to person, place, and time. No distress.   HENT:   Head: Normocephalic and atraumatic.   Eyes: Pupils are equal, round, and reactive to light. EOM are normal.   Neck: No thyromegaly present.   Cardiovascular: Normal rate and regular rhythm.    Pulmonary/Chest: Effort normal and breath sounds normal. No respiratory distress. He has no rales.   Abdominal: Soft. Bowel sounds are normal. He exhibits no distension.   Musculoskeletal: He exhibits edema.   Right 1st toe amputation   Neurological: He  is alert and oriented to person, place, and time. No cranial nerve deficit. He exhibits normal muscle tone. Coordination normal.   MMT 5/5   Skin: Skin is warm and dry. He is not diaphoretic. No erythema.   Venous stasis both legs   Psychiatric: His behavior is normal.   Nursing note and vitals reviewed.      Fluids    Intake/Output Summary (Last 24 hours) at 02/13/19 1419  Last data filed at 02/13/19 0500   Gross per 24 hour   Intake                0 ml   Output              600 ml   Net             -600 ml       Laboratory                        Imaging  DX-HIP-UNILATERAL-WITH PELVIS-1 VIEW RIGHT   Final Result      No radiographic evidence of acute traumatic injury right hip.      DX-CHEST-PORTABLE (1 VIEW)   Final Result      Hypoinflation without other evidence for acute cardiopulmonary disease.      MR-BRAIN-W/O   Final Result      1.  MRI of the brain without contrast within normal limits for age with mild white matter changes.   2.  Chronic appearing sinus disease   3.  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 This does not have an aggressive appearance.      US-EXTREMITY VENOUS LOWER BILAT   Final Result      CT-HAND WITH RIGHT   Final Result      1.  Edema in the dorsum of the forearm and hand without a discrete fluid collection to suggest abscess is identified.      US-CAROTID DOPPLER BILAT   Final Result      DX-LUMBAR SPINE-2 OR 3 VIEWS   Final Result         Stable compression deformity in the L1 vertebral body.      CT-CTA CHEST PULMONARY ARTERY W/ RECONS   Final Result         1. No CT evidence of pulmonary embolism.      2. Old right rib fractures.      3. Cholelithiasis. Enlarged spleen with granulomas.      4. Mild superior endplate compression deformity of L1 could be acute. Correlate with point tenderness. No retropulsion. No malalignment.      CT-CSPINE WITHOUT PLUS RECONS   Final Result         1. No acute fracture from C1 through T3 is  visualized.         CT-HEAD W/O   Final Result         1. No acute intracranial abnormality. No evidence of acute intracranial hemorrhage or mass lesion.               DX-THORACIC SPINE-WITH SWIMMERS VIEW   Final Result      No acute thoracic spine fracture or subluxation identified.      DX-CHEST-PORTABLE (1 VIEW)   Final Result      No acute cardiac or pulmonary abnormality is noted.           Assessment/Plan  * Orthostatic hypotension- (present on admission)   Assessment & Plan    Decreased Florinef to 0.2 mg  Midodrine adjusted parameters (Hold for SBP > 150)     Suicidal ideation   Assessment & Plan    Prozac, Risperdal  1:1 sitter, psychiatry to reassess needs     Fall from ground level- (present on admission)   Assessment & Plan    PT/OT     Syncope- (present on admission)   Assessment & Plan    Autonomic dysfunction?       Debility- (present on admission)   Assessment & Plan    PT/OT     Acute respiratory failure with hypoxia (HCC)- (present on admission)   Assessment & Plan    Resolved     Lumbar compression fracture (HCC)- (present on admission)   Assessment & Plan    TLSO brace   pain control  Limit narcotics  Add NSAID as needed, monitor closely    Ambulating with CGA     Psychogenic nonepileptic seizure- (present on admission)   Assessment & Plan    no treatment indicated.     Chronic diastolic heart failure (HCC)- (present on admission)   Assessment & Plan    Off Lisinopril and Lasix     Venous stasis dermatitis of both lower extremities- (present on admission)   Assessment & Plan    Compression     Bilateral leg edema- (present on admission)   Assessment & Plan    compression     Vitamin B12 deficiency- (present on admission)   Assessment & Plan    oral B12     Paroxysmal A-fib (HCC)- (present on admission)   Assessment & Plan    Amiodarone, ASA  [id number] - 3     Essential hypertension- (present on admission)   Assessment & Plan    Off Lisinopril     Mixed hyperlipidemia- (present on admission)    Assessment & Plan    Pravastatin       Type 2 diabetes mellitus, without long-term current use of insulin, with peripheral neuropathy (HCC)- (present on admission)   Assessment & Plan    Diet controlled     Hyponatremia- (present on admission)   Assessment & Plan    Salt tabs, follow bmp          VTE prophylaxis: Lovenox

## 2019-02-13 NOTE — CARE PLAN
Problem: Safety  Goal: Will remain free from injury  Outcome: PROGRESSING AS EXPECTED  Bed in low position, call light in reach, 1:1 sitter at bedside.

## 2019-02-13 NOTE — THERAPY
"Physical Therapy Treatment completed.   Bed Mobility:  Supine to Sit: Supervised  Transfers: Sit to Stand: Stand by Assist  Gait: Level Of Assist: Stand by Assist with No Equipment Needed       Plan of Care: Patient with no further skilled PT needs in the acute care setting at this time  Discharge Recommendations: Equipment: No Equipment Needed (pt reports has FWW/wc and other AD); see below    Pt progressing well at this time. He was able to demonstrate hallway ambulatoin with no AD with SBA. He does have 1 slight LOB 2' to impaired proprioception though self corrects. He also is aware of sensory deficits. He has no c/o dizziness/lightheadedness this visit and reports he is much improved in this regards. Note that per nsg and sitter pt has been ambulatory with no AD with SPv/SBA without issue. He is primarily limited 2' to cog/psych issues and anticipate pt will continue to progress well with icnreased OOB activity from staff. Would recommend continued PT after dc from hospital for higher level balance deficits (given impaired proprioception) though this could be done either SNF/rehab or outpatient dependent on pt's dc location based on medical/psych concerns. Will be available for dc planning as needed.      See \"Rehab Therapy-Acute\" Patient Summary Report for complete documentation.       "

## 2019-02-13 NOTE — PROGRESS NOTES
Assumed patient care at 0700.  Patient is alert, awake, and oriented x4, non labored breathing on room air, no signs of acute distress noted. Patient endorses suicidal ideations with a plan. 1:1 sitter at bedside.  Patient denies any chest pain, shortness of breath, or difficulty breathing.  Patient complains of lower back pain--oxycodone PRN given with good effect.  Lungs are diminished, on room air.  Abdomen is soft, non tender. Last bowel movement was today 2/12/19.  Patient denies any difficulty urinating. Bilateral lower leg edema noted--patient instructed to elevate bilateral lower legs when in bed. Patient has been up to the chair and ambulated to the hallways without any difficulty. Bed in lowest position. Call light and belongings within reach. [facility]

## 2019-02-13 NOTE — PROGRESS NOTES
Assumed care of patient at 0700 . Received report from RN. Patient is AOX4 . Assessment complete. Labs reviewed.Patient and RN discussed plan of care. Patient questions answered. Patient needs are met at this time. Bed in lowest and locked position. Upper bed rails up.  Call light is within reach. Hourly rounding in place. /66   Pulse 63   Temp 36.1 °C (96.9 °F) (Temporal)   Resp 18   Ht 1.829 m (6')   Wt 118.6 kg (261 lb 7.5 oz)   SpO2 94%   BMI 35.46 kg/m²

## 2019-02-14 PROBLEM — B37.49 CANDIDA INFECTION OF GENITAL REGION: Status: ACTIVE | Noted: 2019-02-14

## 2019-02-14 PROCEDURE — A9270 NON-COVERED ITEM OR SERVICE: HCPCS | Performed by: HOSPITALIST

## 2019-02-14 PROCEDURE — 700102 HCHG RX REV CODE 250 W/ 637 OVERRIDE(OP): Performed by: FAMILY MEDICINE

## 2019-02-14 PROCEDURE — 306311 ANTI-EMBOLISM STOCKINGS XXLRG LNG: Performed by: INTERNAL MEDICINE

## 2019-02-14 PROCEDURE — 700102 HCHG RX REV CODE 250 W/ 637 OVERRIDE(OP): Performed by: INTERNAL MEDICINE

## 2019-02-14 PROCEDURE — G0378 HOSPITAL OBSERVATION PER HR: HCPCS

## 2019-02-14 PROCEDURE — 700102 HCHG RX REV CODE 250 W/ 637 OVERRIDE(OP): Performed by: HOSPITALIST

## 2019-02-14 PROCEDURE — A9270 NON-COVERED ITEM OR SERVICE: HCPCS | Performed by: FAMILY MEDICINE

## 2019-02-14 PROCEDURE — A9270 NON-COVERED ITEM OR SERVICE: HCPCS | Performed by: PSYCHIATRY & NEUROLOGY

## 2019-02-14 PROCEDURE — 99225 PR SUBSEQUENT OBSERVATION CARE,LEVEL II: CPT | Performed by: INTERNAL MEDICINE

## 2019-02-14 PROCEDURE — A6250 SKIN SEAL PROTECT MOISTURIZR: HCPCS | Performed by: INTERNAL MEDICINE

## 2019-02-14 PROCEDURE — 700102 HCHG RX REV CODE 250 W/ 637 OVERRIDE(OP): Performed by: PSYCHIATRY & NEUROLOGY

## 2019-02-14 PROCEDURE — 700111 HCHG RX REV CODE 636 W/ 250 OVERRIDE (IP): Performed by: FAMILY MEDICINE

## 2019-02-14 RX ORDER — NYSTATIN 100000 [USP'U]/G
POWDER TOPICAL 2 TIMES DAILY
Status: DISCONTINUED | OUTPATIENT
Start: 2019-02-14 | End: 2019-02-27 | Stop reason: HOSPADM

## 2019-02-14 RX ADMIN — NYSTATIN: 100000 POWDER TOPICAL at 17:29

## 2019-02-14 RX ADMIN — ACETAMINOPHEN 650 MG: 325 TABLET, FILM COATED ORAL at 11:37

## 2019-02-14 RX ADMIN — PYRIDOSTIGMINE BROMIDE 90 MG: 60 TABLET ORAL at 11:38

## 2019-02-14 RX ADMIN — ENOXAPARIN SODIUM 40 MG: 100 INJECTION SUBCUTANEOUS at 06:18

## 2019-02-14 RX ADMIN — RISPERIDONE 0.5 MG: 0.5 TABLET ORAL at 17:29

## 2019-02-14 RX ADMIN — ACETAMINOPHEN 650 MG: 325 TABLET, FILM COATED ORAL at 17:29

## 2019-02-14 RX ADMIN — MIDODRINE HYDROCHLORIDE 10 MG: 5 TABLET ORAL at 17:29

## 2019-02-14 RX ADMIN — METOCLOPRAMIDE HYDROCHLORIDE 10 MG: 10 TABLET ORAL at 11:38

## 2019-02-14 RX ADMIN — SODIUM CHLORIDE TAB 1 GM 1 G: 1 TAB at 08:14

## 2019-02-14 RX ADMIN — METOCLOPRAMIDE HYDROCHLORIDE 10 MG: 10 TABLET ORAL at 17:29

## 2019-02-14 RX ADMIN — PYRIDOSTIGMINE BROMIDE 90 MG: 60 TABLET ORAL at 17:28

## 2019-02-14 RX ADMIN — ASPIRIN 81 MG 81 MG: 81 TABLET ORAL at 06:17

## 2019-02-14 RX ADMIN — SODIUM CHLORIDE TAB 1 GM 1 G: 1 TAB at 11:38

## 2019-02-14 RX ADMIN — RISPERIDONE 0.5 MG: 0.5 TABLET ORAL at 06:18

## 2019-02-14 RX ADMIN — OXYCODONE HYDROCHLORIDE AND ACETAMINOPHEN 500 MG: 500 TABLET ORAL at 06:16

## 2019-02-14 RX ADMIN — MULTIPLE VITAMINS W/ MINERALS TAB 1 TABLET: TAB at 06:18

## 2019-02-14 RX ADMIN — SENNOSIDES AND DOCUSATE SODIUM 2 TABLET: 8.6; 5 TABLET ORAL at 06:17

## 2019-02-14 RX ADMIN — Medication 400 MG: at 06:17

## 2019-02-14 RX ADMIN — PYRIDOSTIGMINE BROMIDE 90 MG: 60 TABLET ORAL at 06:19

## 2019-02-14 RX ADMIN — PRAVASTATIN SODIUM 20 MG: 20 TABLET ORAL at 17:29

## 2019-02-14 RX ADMIN — FLUDROCORTISONE ACETATE 0.2 MG: 0.1 TABLET ORAL at 06:19

## 2019-02-14 RX ADMIN — SODIUM CHLORIDE TAB 1 GM 1 G: 1 TAB at 17:29

## 2019-02-14 RX ADMIN — MIDODRINE HYDROCHLORIDE 10 MG: 5 TABLET ORAL at 06:18

## 2019-02-14 RX ADMIN — AMIODARONE HYDROCHLORIDE 50 MG: 200 TABLET ORAL at 06:19

## 2019-02-14 RX ADMIN — FLUOXETINE HYDROCHLORIDE 60 MG: 20 CAPSULE ORAL at 06:15

## 2019-02-14 RX ADMIN — METOCLOPRAMIDE HYDROCHLORIDE 10 MG: 10 TABLET ORAL at 06:16

## 2019-02-14 RX ADMIN — CYANOCOBALAMIN TAB 500 MCG 1000 MCG: 500 TAB at 06:16

## 2019-02-14 ASSESSMENT — ENCOUNTER SYMPTOMS
WEAKNESS: 1
DIAPHORESIS: 0
NAUSEA: 0
DIZZINESS: 0
ABDOMINAL PAIN: 0
BACK PAIN: 1
FOCAL WEAKNESS: 0
HEARTBURN: 0
FEVER: 0
MYALGIAS: 1
COUGH: 0
CHILLS: 0
HEADACHES: 0
DEPRESSION: 0

## 2019-02-14 NOTE — DISCHARGE PLANNING
Agency/Facility Name: [facility]  Spoke To: Lena  Outcome: Unable to accept pt if on a larry lift. Does not have larry lift in facility.

## 2019-02-14 NOTE — CARE PLAN
Problem: Pain Management  Goal: Pain level will decrease to patient's comfort goal  Outcome: PROGRESSING AS EXPECTED  Pt aware to notify this RN when needing pain medication.     Problem: Bowel/Gastric:  Goal: Normal bowel function is maintained or improved  Outcome: PROGRESSING AS EXPECTED  Pt reports LBM 2/13

## 2019-02-14 NOTE — CARE PLAN
Problem: Safety  Goal: Will remain free from injury  Outcome: PROGRESSING AS EXPECTED  Bed in low position, bed alarm on, 1:1 sitter at bedside.

## 2019-02-14 NOTE — DISCHARGE PLANNING
Situation: Pt currently on legal hold pending placement in inpt psych facility     Background: He was out walking with his girlfriend, when he suddenly felt weak, falling to the floor, he remembers coming to when emergency medical services were at his side.  He states that he fell after passing out, and hit his head, as well as his back.Pt has significant issues with dizziness and light headedness in sitting and standing due to bp drop.      PT/OT continuing to work with pt to increase activity tolerance. Pt reported he will be getting up with staff 3 x daily. Psychiatry working with pt addressing SI and acceptance of current medical condition.     Assessment: Pt increasing activity tolerance, no c/o dizziness/lightheadedness and reports he is much improved in this regards. Pt continues to express SI     Recommendation/Requests: Pt will discharge to inpt psych facility vs. shelter pending legal hold and increased activity tolerance. Case discussed with Leadership.

## 2019-02-14 NOTE — DISCHARGE PLANNING
Agency/Facility Name: [facility]  Outcome: Updated notes faxed to Lena in admissions at 1520.

## 2019-02-14 NOTE — DISCHARGE PLANNING
JAMES contacted Amaris at [facility] to discuss denial. JAMES informed Amaris that pt does not need larry lift. Amaris stated that on the psychiatric follow up not received under social history it stated that pt requires a larry lift and will not be able to return to shelter. Amaris requested updated notes specifically indicated pt's level. JAMES informed MD.

## 2019-02-14 NOTE — DISCHARGE PLANNING
Agency/Facility Name: [facility]  Spoke To: Lena  Outcome: Requesting recent MAR, wound notes and psych evaluation. Sent documents at 1036.

## 2019-02-15 PROCEDURE — A9270 NON-COVERED ITEM OR SERVICE: HCPCS | Performed by: FAMILY MEDICINE

## 2019-02-15 PROCEDURE — 700102 HCHG RX REV CODE 250 W/ 637 OVERRIDE(OP): Performed by: HOSPITALIST

## 2019-02-15 PROCEDURE — A9270 NON-COVERED ITEM OR SERVICE: HCPCS | Performed by: HOSPITALIST

## 2019-02-15 PROCEDURE — A9270 NON-COVERED ITEM OR SERVICE: HCPCS | Performed by: PSYCHIATRY & NEUROLOGY

## 2019-02-15 PROCEDURE — 99225 PR SUBSEQUENT OBSERVATION CARE,LEVEL II: CPT | Performed by: INTERNAL MEDICINE

## 2019-02-15 PROCEDURE — 700102 HCHG RX REV CODE 250 W/ 637 OVERRIDE(OP): Performed by: FAMILY MEDICINE

## 2019-02-15 PROCEDURE — 99213 OFFICE O/P EST LOW 20 MIN: CPT | Performed by: PSYCHIATRY & NEUROLOGY

## 2019-02-15 PROCEDURE — 700111 HCHG RX REV CODE 636 W/ 250 OVERRIDE (IP): Performed by: FAMILY MEDICINE

## 2019-02-15 PROCEDURE — 700102 HCHG RX REV CODE 250 W/ 637 OVERRIDE(OP): Performed by: PSYCHIATRY & NEUROLOGY

## 2019-02-15 PROCEDURE — G0378 HOSPITAL OBSERVATION PER HR: HCPCS

## 2019-02-15 PROCEDURE — A6250 SKIN SEAL PROTECT MOISTURIZR: HCPCS | Performed by: INTERNAL MEDICINE

## 2019-02-15 RX ORDER — DIPHENHYDRAMINE HCL 25 MG
25 TABLET ORAL EVERY 6 HOURS PRN
Status: DISCONTINUED | OUTPATIENT
Start: 2019-02-15 | End: 2019-02-27 | Stop reason: HOSPADM

## 2019-02-15 RX ORDER — RISPERIDONE 0.5 MG/1
1 TABLET ORAL 2 TIMES DAILY
Status: DISCONTINUED | OUTPATIENT
Start: 2019-02-15 | End: 2019-02-20

## 2019-02-15 RX ADMIN — MIDODRINE HYDROCHLORIDE 10 MG: 5 TABLET ORAL at 05:49

## 2019-02-15 RX ADMIN — OXYCODONE HYDROCHLORIDE AND ACETAMINOPHEN 500 MG: 500 TABLET ORAL at 05:48

## 2019-02-15 RX ADMIN — ACETAMINOPHEN 650 MG: 325 TABLET, FILM COATED ORAL at 08:33

## 2019-02-15 RX ADMIN — PYRIDOSTIGMINE BROMIDE 90 MG: 60 TABLET ORAL at 17:37

## 2019-02-15 RX ADMIN — MIDODRINE HYDROCHLORIDE 10 MG: 5 TABLET ORAL at 12:19

## 2019-02-15 RX ADMIN — SODIUM CHLORIDE TAB 1 GM 1 G: 1 TAB at 17:38

## 2019-02-15 RX ADMIN — AMIODARONE HYDROCHLORIDE 50 MG: 200 TABLET ORAL at 05:45

## 2019-02-15 RX ADMIN — Medication 400 MG: at 05:48

## 2019-02-15 RX ADMIN — ASPIRIN 81 MG 81 MG: 81 TABLET ORAL at 05:48

## 2019-02-15 RX ADMIN — FLUDROCORTISONE ACETATE 0.2 MG: 0.1 TABLET ORAL at 05:47

## 2019-02-15 RX ADMIN — PYRIDOSTIGMINE BROMIDE 90 MG: 60 TABLET ORAL at 12:20

## 2019-02-15 RX ADMIN — METOCLOPRAMIDE HYDROCHLORIDE 10 MG: 10 TABLET ORAL at 12:20

## 2019-02-15 RX ADMIN — NYSTATIN: 100000 POWDER TOPICAL at 17:38

## 2019-02-15 RX ADMIN — RISPERIDONE 1 MG: 0.5 TABLET ORAL at 17:37

## 2019-02-15 RX ADMIN — FLUOXETINE HYDROCHLORIDE 60 MG: 20 CAPSULE ORAL at 05:46

## 2019-02-15 RX ADMIN — ACETAMINOPHEN 650 MG: 325 TABLET, FILM COATED ORAL at 17:40

## 2019-02-15 RX ADMIN — PRAVASTATIN SODIUM 20 MG: 20 TABLET ORAL at 17:38

## 2019-02-15 RX ADMIN — PYRIDOSTIGMINE BROMIDE 90 MG: 60 TABLET ORAL at 05:45

## 2019-02-15 RX ADMIN — METOCLOPRAMIDE HYDROCHLORIDE 10 MG: 10 TABLET ORAL at 17:37

## 2019-02-15 RX ADMIN — RISPERIDONE 0.5 MG: 0.5 TABLET ORAL at 05:47

## 2019-02-15 RX ADMIN — METOCLOPRAMIDE HYDROCHLORIDE 10 MG: 10 TABLET ORAL at 05:48

## 2019-02-15 RX ADMIN — SODIUM CHLORIDE TAB 1 GM 1 G: 1 TAB at 12:20

## 2019-02-15 RX ADMIN — SODIUM CHLORIDE TAB 1 GM 1 G: 1 TAB at 08:32

## 2019-02-15 RX ADMIN — CYANOCOBALAMIN TAB 500 MCG 1000 MCG: 500 TAB at 05:47

## 2019-02-15 RX ADMIN — MULTIPLE VITAMINS W/ MINERALS TAB 1 TABLET: TAB at 05:47

## 2019-02-15 RX ADMIN — ENOXAPARIN SODIUM 40 MG: 100 INJECTION SUBCUTANEOUS at 05:49

## 2019-02-15 RX ADMIN — NYSTATIN: 100000 POWDER TOPICAL at 05:50

## 2019-02-15 ASSESSMENT — ENCOUNTER SYMPTOMS
MEMORY LOSS: 0
DEPRESSION: 0
COUGH: 0
DIAPHORESIS: 0
HEADACHES: 0
WEAKNESS: 1
FEVER: 0
MYALGIAS: 1
ABDOMINAL PAIN: 0
CHILLS: 0
FLANK PAIN: 0
BACK PAIN: 1
NAUSEA: 0

## 2019-02-15 NOTE — DISCHARGE PLANNING
NELLYW received note from pt indicating he did not want to go to inpt psych and would leave AMA. LSW met with pt to discuss. LSW informed pt he is on a legal hold and that meant he can not leave AMA. Pt stated he does not feel like he needed to go to an inpt psych facility. LSW informed pt that bedside RN paged Psych and they will be coming to assess him. Pt expressed understanding and is willing to wait to discuss with Psych team.

## 2019-02-15 NOTE — CARE PLAN
Problem: Discharge Barriers/Planning  Goal: Patient's continuum of care needs will be met    Intervention: Collaborate with Transitional Care Team and Interdisciplinary Team to meet discharge needs  DC pending acceptance to [facility]      Problem: Mobility  Goal: Risk for activity intolerance will decrease    Intervention: Assess and monitor signs of activity intolerance  Pt ambulating frequently with assistance

## 2019-02-15 NOTE — PROGRESS NOTES
"This RN assumed care of the pt at 0700. Pt is seen ambulating around the unit. Pt stopped to speak with this RN, states he is no longer having any suicidal thoughts and that the meds are working. He states he does not need to go to a Psych hospital, that he is \"not crazy\". Psych paged. Pt notified that psych will be in to see pt today. LUNA Oropeza also aware. Sitter remains at bedside. Will continue to monitor pt.   "

## 2019-02-15 NOTE — PROGRESS NOTES
Report received. Assumed care, assessment complete. Pt is A & O x 4.  Pt reports chronic pain. Fall precautions and appropriate signs in place. Pt educated regarding fall precautions. Bed alarm in use. 1:1 sitter at bedside per LegAL 2000. Pt denies any additional needs at this time. Call light within reach.

## 2019-02-15 NOTE — PROGRESS NOTES
[facility] Medicine Daily Progress Note    Date of Service  2/15/2019    Chief Complaint  53 y.o. male admitted 12/28/2018 with Syncope    Hospital Course  Admitted with Syncope, noted to have L1 compression fracture, work up negative except for orthostatic hypotension.  He then started complaining of suicidal thoughts.    Interval Problem Update  No new events  Intermittently request AMA dc, seen by psychiatry, cont legal hold    Consultants/Specialty  Psychiatry  Neurosurgery signed off    Code Status  Full    Disposition  Legal hold  1:1 sitter  Awaiting inpatient psychiatry placement    Review of Systems  Review of Systems   Constitutional: Negative for chills, diaphoresis, fever and malaise/fatigue.   Respiratory: Negative for cough.    Cardiovascular: Positive for leg swelling. Negative for chest pain.   Gastrointestinal: Negative for abdominal pain and nausea.   Genitourinary: Negative for dysuria and flank pain.   Musculoskeletal: Positive for back pain and myalgias.   Skin: Positive for itching and rash.   Neurological: Positive for weakness. Negative for headaches.   Psychiatric/Behavioral: Positive for suicidal ideas. Negative for depression and memory loss.        Physical Exam  Temp:  [36.6 °C (97.8 °F)-36.7 °C (98.1 °F)] 36.6 °C (97.9 °F)  Pulse:  [58-73] 58  Resp:  [16-18] 18  BP: (119-153)/(75-96) 119/75  SpO2:  [93 %-97 %] 93 %    Physical Exam   Constitutional: He is oriented to person, place, and time. No distress.   HENT:   Head: Normocephalic and atraumatic.   Eyes: Pupils are equal, round, and reactive to light. EOM are normal.   Cardiovascular: Normal rate and regular rhythm.    No murmur heard.  Pulmonary/Chest: Effort normal and breath sounds normal. No respiratory distress.   Abdominal: Soft. Bowel sounds are normal. He exhibits no distension. There is no tenderness.   Musculoskeletal: He exhibits edema.   Right 1st toe amputation   Neurological: He is alert and oriented to person, place, and  time. No cranial nerve deficit. Coordination normal.   MMT 5/5   Skin: Skin is warm and dry. Rash noted. He is not diaphoretic. There is erythema.   Venous stasis both legs   Psychiatric: His behavior is normal.   Nursing note and vitals reviewed.      Fluids    Intake/Output Summary (Last 24 hours) at 02/15/19 1540  Last data filed at 02/15/19 1100   Gross per 24 hour   Intake              300 ml   Output              500 ml   Net             -200 ml       Laboratory                        Imaging  DX-HIP-UNILATERAL-WITH PELVIS-1 VIEW RIGHT   Final Result      No radiographic evidence of acute traumatic injury right hip.      DX-CHEST-PORTABLE (1 VIEW)   Final Result      Hypoinflation without other evidence for acute cardiopulmonary disease.      MR-BRAIN-W/O   Final Result      1.  MRI of the brain without contrast within normal limits for age with mild white matter changes.   2.  Chronic appearing sinus disease   3.  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 This does not have an aggressive appearance.      US-EXTREMITY VENOUS LOWER BILAT   Final Result      CT-HAND WITH RIGHT   Final Result      1.  Edema in the dorsum of the forearm and hand without a discrete fluid collection to suggest abscess is identified.      US-CAROTID DOPPLER BILAT   Final Result      DX-LUMBAR SPINE-2 OR 3 VIEWS   Final Result         Stable compression deformity in the L1 vertebral body.      CT-CTA CHEST PULMONARY ARTERY W/ RECONS   Final Result         1. No CT evidence of pulmonary embolism.      2. Old right rib fractures.      3. Cholelithiasis. Enlarged spleen with granulomas.      4. Mild superior endplate compression deformity of L1 could be acute. Correlate with point tenderness. No retropulsion. No malalignment.      CT-CSPINE WITHOUT PLUS RECONS   Final Result         1. No acute fracture from C1 through T3 is visualized.         CT-HEAD W/O   Final Result         1. No acute  intracranial abnormality. No evidence of acute intracranial hemorrhage or mass lesion.               DX-THORACIC SPINE-WITH SWIMMERS VIEW   Final Result      No acute thoracic spine fracture or subluxation identified.      DX-CHEST-PORTABLE (1 VIEW)   Final Result      No acute cardiac or pulmonary abnormality is noted.           Assessment/Plan  * Orthostatic hypotension- (present on admission)   Assessment & Plan    Decreased Florinef to 0.2 mg  Midodrine adjusted parameters (Hold for SBP > 150)     Suicidal ideation   Assessment & Plan    Prozac, Risperdal  1:1 sitter, psychiatry to reassess needs    2/15 cont legal hold     Fall from ground level- (present on admission)   Assessment & Plan    PT/OT     Syncope- (present on admission)   Assessment & Plan    Autonomic dysfunction?       Debility- (present on admission)   Assessment & Plan    PT/OT     Acute respiratory failure with hypoxia (HCC)- (present on admission)   Assessment & Plan    Resolved     Lumbar compression fracture (HCC)- (present on admission)   Assessment & Plan    TLSO brace   pain control  Limit narcotics  NSAID as needed, monitor closely    Ambulating with CGA     Psychogenic nonepileptic seizure- (present on admission)   Assessment & Plan    no treatment indicated.     Chronic diastolic heart failure (HCC)- (present on admission)   Assessment & Plan    Off Lisinopril and Lasix     Venous stasis dermatitis of both lower extremities- (present on admission)   Assessment & Plan    Compression     Bilateral leg edema- (present on admission)   Assessment & Plan    compression     Vitamin B12 deficiency- (present on admission)   Assessment & Plan    oral B12     Candida infection of genital region   Assessment & Plan    - nystatin powder  - benadryl prn     Paroxysmal A-fib (HCC)- (present on admission)   Assessment & Plan    Amiodarone, ASA  [id number] - 3     Essential hypertension- (present on admission)   Assessment & Plan    Off Lisinopril      Mixed hyperlipidemia- (present on admission)   Assessment & Plan    Pravastatin       Type 2 diabetes mellitus, without long-term current use of insulin, with peripheral neuropathy (HCC)- (present on admission)   Assessment & Plan    Diet controlled     Hyponatremia- (present on admission)   Assessment & Plan    Salt tabs, follow bmp          VTE prophylaxis: Lovenox

## 2019-02-15 NOTE — CARE PLAN
Problem: Pain Management  Goal: Pain level will decrease to patient's comfort goal  Outcome: PROGRESSING AS EXPECTED  Pt reports pain well controlled with Tylenol    Problem: Communication  Goal: The ability to communicate needs accurately and effectively will improve  Outcome: PROGRESSING AS EXPECTED  Pt at nursing station this am telling this RN that he is not willing to go to [facility], states he is not having any suicidal thoughts and that the meds are working. Psych paged. Will monitor pt.

## 2019-02-16 PROCEDURE — 700102 HCHG RX REV CODE 250 W/ 637 OVERRIDE(OP): Performed by: PSYCHIATRY & NEUROLOGY

## 2019-02-16 PROCEDURE — A9270 NON-COVERED ITEM OR SERVICE: HCPCS | Performed by: FAMILY MEDICINE

## 2019-02-16 PROCEDURE — A9270 NON-COVERED ITEM OR SERVICE: HCPCS | Performed by: PSYCHIATRY & NEUROLOGY

## 2019-02-16 PROCEDURE — G0378 HOSPITAL OBSERVATION PER HR: HCPCS

## 2019-02-16 PROCEDURE — 700102 HCHG RX REV CODE 250 W/ 637 OVERRIDE(OP): Performed by: FAMILY MEDICINE

## 2019-02-16 PROCEDURE — A9270 NON-COVERED ITEM OR SERVICE: HCPCS | Performed by: HOSPITALIST

## 2019-02-16 PROCEDURE — 700102 HCHG RX REV CODE 250 W/ 637 OVERRIDE(OP): Performed by: HOSPITALIST

## 2019-02-16 PROCEDURE — 700111 HCHG RX REV CODE 636 W/ 250 OVERRIDE (IP): Performed by: FAMILY MEDICINE

## 2019-02-16 PROCEDURE — 99224 PR SUBSEQUENT OBSERVATION CARE,LEVEL I: CPT | Performed by: INTERNAL MEDICINE

## 2019-02-16 RX ADMIN — CYANOCOBALAMIN TAB 500 MCG 1000 MCG: 500 TAB at 05:27

## 2019-02-16 RX ADMIN — PRAVASTATIN SODIUM 20 MG: 20 TABLET ORAL at 18:07

## 2019-02-16 RX ADMIN — RISPERIDONE 1 MG: 0.5 TABLET ORAL at 05:28

## 2019-02-16 RX ADMIN — Medication 400 MG: at 05:27

## 2019-02-16 RX ADMIN — MIDODRINE HYDROCHLORIDE 10 MG: 5 TABLET ORAL at 17:39

## 2019-02-16 RX ADMIN — METOCLOPRAMIDE HYDROCHLORIDE 10 MG: 10 TABLET ORAL at 05:28

## 2019-02-16 RX ADMIN — PYRIDOSTIGMINE BROMIDE 90 MG: 60 TABLET ORAL at 17:38

## 2019-02-16 RX ADMIN — NYSTATIN: 100000 POWDER TOPICAL at 05:29

## 2019-02-16 RX ADMIN — PYRIDOSTIGMINE BROMIDE 90 MG: 60 TABLET ORAL at 05:27

## 2019-02-16 RX ADMIN — METOCLOPRAMIDE HYDROCHLORIDE 10 MG: 10 TABLET ORAL at 17:39

## 2019-02-16 RX ADMIN — FLUDROCORTISONE ACETATE 0.2 MG: 0.1 TABLET ORAL at 05:27

## 2019-02-16 RX ADMIN — AMIODARONE HYDROCHLORIDE 50 MG: 200 TABLET ORAL at 05:28

## 2019-02-16 RX ADMIN — MULTIPLE VITAMINS W/ MINERALS TAB 1 TABLET: TAB at 05:28

## 2019-02-16 RX ADMIN — SODIUM CHLORIDE TAB 1 GM 1 G: 1 TAB at 17:38

## 2019-02-16 RX ADMIN — ENOXAPARIN SODIUM 40 MG: 100 INJECTION SUBCUTANEOUS at 05:29

## 2019-02-16 RX ADMIN — PYRIDOSTIGMINE BROMIDE 90 MG: 60 TABLET ORAL at 11:43

## 2019-02-16 RX ADMIN — FLUOXETINE HYDROCHLORIDE 60 MG: 20 CAPSULE ORAL at 05:28

## 2019-02-16 RX ADMIN — OXYCODONE HYDROCHLORIDE AND ACETAMINOPHEN 500 MG: 500 TABLET ORAL at 05:27

## 2019-02-16 RX ADMIN — RISPERIDONE 1 MG: 0.5 TABLET ORAL at 17:39

## 2019-02-16 RX ADMIN — NYSTATIN: 100000 POWDER TOPICAL at 17:39

## 2019-02-16 RX ADMIN — SODIUM CHLORIDE TAB 1 GM 1 G: 1 TAB at 11:42

## 2019-02-16 RX ADMIN — METOCLOPRAMIDE HYDROCHLORIDE 10 MG: 10 TABLET ORAL at 11:42

## 2019-02-16 RX ADMIN — MIDODRINE HYDROCHLORIDE 10 MG: 5 TABLET ORAL at 11:42

## 2019-02-16 RX ADMIN — SODIUM CHLORIDE TAB 1 GM 1 G: 1 TAB at 07:30

## 2019-02-16 RX ADMIN — ASPIRIN 81 MG 81 MG: 81 TABLET ORAL at 05:28

## 2019-02-16 ASSESSMENT — ENCOUNTER SYMPTOMS
BACK PAIN: 1
CHILLS: 0
PALPITATIONS: 0
MEMORY LOSS: 0
DIZZINESS: 0
FEVER: 0
ABDOMINAL PAIN: 0
NAUSEA: 0
COUGH: 0
MYALGIAS: 1
DEPRESSION: 0
HEADACHES: 0
SHORTNESS OF BREATH: 0
WEAKNESS: 1

## 2019-02-16 NOTE — PSYCHIATRY
"PSYCHIATRIC FOLLOW UP:    Reason for consult: si  Consulting phsyician: Slime      HPI:     Sebastián Castro is a 53 y.o. year old male with a PMH of CHF, a fib, DM, HTN,  a fib s/p cardioversion, orthostatic hypotentison with depressed mood, PTSD, and some functional seizures. He was also noted to have an L1 compression fracture.     He wrote a note to nursing that he was very angry with his care and wanted to leave immediately. However, when I speak with him he states nightmares are gone, his mood is improving, but he is still hearing voices and is concerned about this. He states they say negative things but they aren't command. His BP is improving considerably. He is able to walk around well. There was concern if we took him off the hold he would make suicidal statements, and I agree this is very possible. He can't explain why he is giving such inconsistent reports of how he is feeling. He reports he is feeling better now than he was earlier when her wrote the note. He states he was \"angry at everything\" and he can't say why. Agrees to increase in risperdal.     Psychiatric Examination: observed phenomenon:  Vitals: /73   Pulse 70   Temp 37.2 °C (98.9 °F) (Temporal)   Resp 17   Ht 1.829 m (6')   Wt 118.7 kg (261 lb 11 oz)   SpO2 95%   BMI 35.49 kg/m²  Body mass index is 35.49 kg/m².  Musculoskeletal no psychomotor agitatio or retardation.   Appearance: Grooming wnl   Thoughts Process: Logical and sequential, goal-directed   Thought Content: +ah; no evidence of delusions, no ideas of reference, no internal stimulation noted   Speech: Nl tone, rate, and volume. Not pressured. Understandable.   Mood:    depressed  Affect:    Full, congruent  SI/HI:   Denies si today.   Attention/Alertness:   Awake, alert. Attention wnl   Memory:    Grossly intact.   Orientation:    Grossly intact.   Cognition:Grossly intact.   Insight: decreased   Judgement:  Decreased   Neurological Testing:    Medical systems " reviewed:   Eyes: no blurry vision no redness   Skin:no rash   CV:no cp no palpitations    Lungs:no sob   Neuro: weakness  GI:no n/v, no diarrhea  :no dysuria   Endocrine:hypotentsion   Psych:see hpi   Musculoskeletal:   All other systems reviewed and are negative.       Soc history:      Lab results/tests:   No results found for this or any previous visit (from the past 48 hour(s)).  DX-HIP-UNILATERAL-WITH PELVIS-1 VIEW RIGHT   Final Result      No radiographic evidence of acute traumatic injury right hip.      DX-CHEST-PORTABLE (1 VIEW)   Final Result      Hypoinflation without other evidence for acute cardiopulmonary disease.      MR-BRAIN-W/O   Final Result      1.  MRI of the brain without contrast within normal limits for age with mild white matter changes.   2.  Chronic appearing sinus disease   3.  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 This does not have an aggressive appearance.      US-EXTREMITY VENOUS LOWER BILAT   Final Result      CT-HAND WITH RIGHT   Final Result      1.  Edema in the dorsum of the forearm and hand without a discrete fluid collection to suggest abscess is identified.      US-CAROTID DOPPLER BILAT   Final Result      DX-LUMBAR SPINE-2 OR 3 VIEWS   Final Result         Stable compression deformity in the L1 vertebral body.      CT-CTA CHEST PULMONARY ARTERY W/ RECONS   Final Result         1. No CT evidence of pulmonary embolism.      2. Old right rib fractures.      3. Cholelithiasis. Enlarged spleen with granulomas.      4. Mild superior endplate compression deformity of L1 could be acute. Correlate with point tenderness. No retropulsion. No malalignment.      CT-CSPINE WITHOUT PLUS RECONS   Final Result         1. No acute fracture from C1 through T3 is visualized.         CT-HEAD W/O   Final Result         1. No acute intracranial abnormality. No evidence of acute intracranial hemorrhage or mass lesion.               DX-THORACIC  SPINE-WITH SWIMMERS VIEW   Final Result      No acute thoracic spine fracture or subluxation identified.      DX-CHEST-PORTABLE (1 VIEW)   Final Result      No acute cardiac or pulmonary abnormality is noted.               Assessment:  MDD, severe, without psychotic features           Plan:  legal hold: extended    I continue to be hesitant to drop hold with his frequent inconsistencies and how long he has been waiting for a bed at [facility]. As he is still hearing voices and wants to stay will keep him over the weekend and increase his risperdal to 1mg po bid.     Will follow

## 2019-02-16 NOTE — PROGRESS NOTES
[facility] Medicine Daily Progress Note    Date of Service  2/16/2019    Chief Complaint  53 y.o. male admitted 12/28/2018 with Syncope    Hospital Course  Admitted with Syncope, noted to have L1 compression fracture, work up negative except for orthostatic hypotension.  He then started complaining of suicidal thoughts.    Interval Problem Update  Chatting with staff  Appears comfortable  No new complaints    Consultants/Specialty  Psychiatry  Neurosurgery signed off    Code Status  Full    Disposition  Legal hold  1:1 sitter  Awaiting inpatient psychiatry placement    Review of Systems  Review of Systems   Constitutional: Negative for chills and fever.   HENT: Negative for hearing loss.    Respiratory: Negative for cough and shortness of breath.    Cardiovascular: Positive for leg swelling. Negative for chest pain and palpitations.   Gastrointestinal: Negative for abdominal pain and nausea.   Genitourinary: Negative for dysuria.   Musculoskeletal: Positive for back pain and myalgias.   Skin: Positive for rash. Negative for itching.   Neurological: Positive for weakness. Negative for dizziness and headaches.   Psychiatric/Behavioral: Positive for suicidal ideas. Negative for depression and memory loss.        Physical Exam  Temp:  [36.1 °C (97 °F)-37.2 °C (98.9 °F)] 37.2 °C (98.9 °F)  Pulse:  [69-70] 70  Resp:  [17-18] 17  BP: (131-159)/(73-83) 131/73  SpO2:  [93 %-98 %] 95 %    Physical Exam   Constitutional: He is oriented to person, place, and time. No distress.   HENT:   Head: Normocephalic and atraumatic.   Eyes: Pupils are equal, round, and reactive to light. EOM are normal. No scleral icterus.   Cardiovascular: Normal rate and regular rhythm.    No murmur heard.  Pulmonary/Chest: Effort normal and breath sounds normal. No respiratory distress. He exhibits no tenderness.   Abdominal: Soft. Bowel sounds are normal. He exhibits no distension.   Musculoskeletal: He exhibits edema. He exhibits no tenderness.   Right  1st toe amputation   Neurological: He is alert and oriented to person, place, and time. No cranial nerve deficit.   MMT 5/5   Skin: Skin is warm and dry. Rash noted. There is erythema.   Venous stasis both legs   Psychiatric: His behavior is normal. Judgment normal.   Nursing note and vitals reviewed.      Fluids    Intake/Output Summary (Last 24 hours) at 02/16/19 1237  Last data filed at 02/16/19 0805   Gross per 24 hour   Intake              300 ml   Output             2070 ml   Net            -1770 ml       Laboratory                        Imaging  DX-HIP-UNILATERAL-WITH PELVIS-1 VIEW RIGHT   Final Result      No radiographic evidence of acute traumatic injury right hip.      DX-CHEST-PORTABLE (1 VIEW)   Final Result      Hypoinflation without other evidence for acute cardiopulmonary disease.      MR-BRAIN-W/O   Final Result      1.  MRI of the brain without contrast within normal limits for age with mild white matter changes.   2.  Chronic appearing sinus disease   3.  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 This does not have an aggressive appearance.      US-EXTREMITY VENOUS LOWER BILAT   Final Result      CT-HAND WITH RIGHT   Final Result      1.  Edema in the dorsum of the forearm and hand without a discrete fluid collection to suggest abscess is identified.      US-CAROTID DOPPLER BILAT   Final Result      DX-LUMBAR SPINE-2 OR 3 VIEWS   Final Result         Stable compression deformity in the L1 vertebral body.      CT-CTA CHEST PULMONARY ARTERY W/ RECONS   Final Result         1. No CT evidence of pulmonary embolism.      2. Old right rib fractures.      3. Cholelithiasis. Enlarged spleen with granulomas.      4. Mild superior endplate compression deformity of L1 could be acute. Correlate with point tenderness. No retropulsion. No malalignment.      CT-CSPINE WITHOUT PLUS RECONS   Final Result         1. No acute fracture from C1 through T3 is visualized.          CT-HEAD W/O   Final Result         1. No acute intracranial abnormality. No evidence of acute intracranial hemorrhage or mass lesion.               DX-THORACIC SPINE-WITH SWIMMERS VIEW   Final Result      No acute thoracic spine fracture or subluxation identified.      DX-CHEST-PORTABLE (1 VIEW)   Final Result      No acute cardiac or pulmonary abnormality is noted.           Assessment/Plan  * Orthostatic hypotension- (present on admission)   Assessment & Plan    Decreased Florinef to 0.2 mg  Midodrine adjusted parameters (Hold for SBP > 150)     Suicidal ideation   Assessment & Plan    Prozac, Risperdal  1:1 sitter, psychiatry to reassess needs    2/15 cont legal hold     Fall from ground level- (present on admission)   Assessment & Plan    PT/OT     Syncope- (present on admission)   Assessment & Plan    Autonomic dysfunction?       Debility- (present on admission)   Assessment & Plan    PT/OT     Acute respiratory failure with hypoxia (HCC)- (present on admission)   Assessment & Plan    Resolved     Lumbar compression fracture (HCC)- (present on admission)   Assessment & Plan    TLSO brace   pain control  Limit narcotics  NSAID as needed, monitor closely    Ambulating with CGA     Psychogenic nonepileptic seizure- (present on admission)   Assessment & Plan    no treatment indicated.     Chronic diastolic heart failure (HCC)- (present on admission)   Assessment & Plan    Off Lisinopril and Lasix     Venous stasis dermatitis of both lower extremities- (present on admission)   Assessment & Plan    Compression     Bilateral leg edema- (present on admission)   Assessment & Plan    compression     Vitamin B12 deficiency- (present on admission)   Assessment & Plan    oral B12     Candida infection of genital region   Assessment & Plan    - nystatin powder  - benadryl prn     Paroxysmal A-fib (HCC)- (present on admission)   Assessment & Plan    Amiodarone, ASA  [id number] - 3     Essential hypertension- (present  on admission)   Assessment & Plan    Off Lisinopril     Mixed hyperlipidemia- (present on admission)   Assessment & Plan    Pravastatin       Type 2 diabetes mellitus, without long-term current use of insulin, with peripheral neuropathy (HCC)- (present on admission)   Assessment & Plan    Diet controlled     Hyponatremia- (present on admission)   Assessment & Plan    Salt tabs, follow bmp          VTE prophylaxis: Lovenox

## 2019-02-16 NOTE — CARE PLAN
Problem: Discharge Barriers/Planning  Goal: Patient's continuum of care needs will be met    Intervention: Collaborate with Transitional Care Team and Interdisciplinary Team to meet discharge needs  DC pending psych reevaluation 2/18      Problem: Psychosocial Needs:  Goal: Level of anxiety will decrease    Intervention: Identify and develop with patient strategies to cope with anxiety triggers  Thera[uetic communication in use

## 2019-02-16 NOTE — PROGRESS NOTES
Report received. Assumed care, assessment complete. Pt is A & O x 4.  Pt reports chronic pain, declines intervention. Fall precautions and appropriate signs in place. RN extension number provided. Pt educated regarding fall precautions. Bed alarm in use. 1:1 sitter at bedside per Legal 2000. Pt denies any additional needs at this time. Call light within reach.

## 2019-02-17 PROCEDURE — 700102 HCHG RX REV CODE 250 W/ 637 OVERRIDE(OP): Performed by: PSYCHIATRY & NEUROLOGY

## 2019-02-17 PROCEDURE — 700102 HCHG RX REV CODE 250 W/ 637 OVERRIDE(OP): Performed by: HOSPITALIST

## 2019-02-17 PROCEDURE — A9270 NON-COVERED ITEM OR SERVICE: HCPCS | Performed by: PSYCHIATRY & NEUROLOGY

## 2019-02-17 PROCEDURE — A9270 NON-COVERED ITEM OR SERVICE: HCPCS | Performed by: HOSPITALIST

## 2019-02-17 PROCEDURE — A9270 NON-COVERED ITEM OR SERVICE: HCPCS | Performed by: FAMILY MEDICINE

## 2019-02-17 PROCEDURE — 99225 PR SUBSEQUENT OBSERVATION CARE,LEVEL II: CPT | Performed by: INTERNAL MEDICINE

## 2019-02-17 PROCEDURE — 700102 HCHG RX REV CODE 250 W/ 637 OVERRIDE(OP): Performed by: FAMILY MEDICINE

## 2019-02-17 PROCEDURE — G0378 HOSPITAL OBSERVATION PER HR: HCPCS

## 2019-02-17 PROCEDURE — 306310 ANTI-EMBOLISM STOCKINGS XXLRG REG: Performed by: INTERNAL MEDICINE

## 2019-02-17 PROCEDURE — 700111 HCHG RX REV CODE 636 W/ 250 OVERRIDE (IP): Performed by: FAMILY MEDICINE

## 2019-02-17 RX ADMIN — NYSTATIN: 100000 POWDER TOPICAL at 18:00

## 2019-02-17 RX ADMIN — METOCLOPRAMIDE HYDROCHLORIDE 10 MG: 10 TABLET ORAL at 17:37

## 2019-02-17 RX ADMIN — RISPERIDONE 1 MG: 0.5 TABLET ORAL at 05:39

## 2019-02-17 RX ADMIN — MIDODRINE HYDROCHLORIDE 10 MG: 5 TABLET ORAL at 09:50

## 2019-02-17 RX ADMIN — SODIUM CHLORIDE TAB 1 GM 1 G: 1 TAB at 11:43

## 2019-02-17 RX ADMIN — FLUOXETINE HYDROCHLORIDE 60 MG: 20 CAPSULE ORAL at 05:38

## 2019-02-17 RX ADMIN — ASPIRIN 81 MG 81 MG: 81 TABLET ORAL at 05:38

## 2019-02-17 RX ADMIN — SODIUM CHLORIDE TAB 1 GM 1 G: 1 TAB at 17:37

## 2019-02-17 RX ADMIN — PRAVASTATIN SODIUM 20 MG: 20 TABLET ORAL at 17:37

## 2019-02-17 RX ADMIN — MULTIPLE VITAMINS W/ MINERALS TAB 1 TABLET: TAB at 05:38

## 2019-02-17 RX ADMIN — OXYCODONE HYDROCHLORIDE AND ACETAMINOPHEN 500 MG: 500 TABLET ORAL at 05:39

## 2019-02-17 RX ADMIN — Medication 400 MG: at 05:38

## 2019-02-17 RX ADMIN — METOCLOPRAMIDE HYDROCHLORIDE 10 MG: 10 TABLET ORAL at 11:43

## 2019-02-17 RX ADMIN — RISPERIDONE 1 MG: 0.5 TABLET ORAL at 17:36

## 2019-02-17 RX ADMIN — ENOXAPARIN SODIUM 40 MG: 100 INJECTION SUBCUTANEOUS at 05:39

## 2019-02-17 RX ADMIN — FLUDROCORTISONE ACETATE 0.2 MG: 0.1 TABLET ORAL at 05:39

## 2019-02-17 RX ADMIN — PYRIDOSTIGMINE BROMIDE 90 MG: 60 TABLET ORAL at 17:36

## 2019-02-17 RX ADMIN — PYRIDOSTIGMINE BROMIDE 90 MG: 60 TABLET ORAL at 11:43

## 2019-02-17 RX ADMIN — NYSTATIN: 100000 POWDER TOPICAL at 05:39

## 2019-02-17 RX ADMIN — AMIODARONE HYDROCHLORIDE 50 MG: 200 TABLET ORAL at 05:39

## 2019-02-17 RX ADMIN — METOCLOPRAMIDE HYDROCHLORIDE 10 MG: 10 TABLET ORAL at 05:38

## 2019-02-17 RX ADMIN — MIDODRINE HYDROCHLORIDE 10 MG: 5 TABLET ORAL at 17:37

## 2019-02-17 RX ADMIN — PYRIDOSTIGMINE BROMIDE 90 MG: 60 TABLET ORAL at 05:38

## 2019-02-17 RX ADMIN — SODIUM CHLORIDE TAB 1 GM 1 G: 1 TAB at 08:12

## 2019-02-17 RX ADMIN — CYANOCOBALAMIN TAB 500 MCG 1000 MCG: 500 TAB at 05:38

## 2019-02-17 ASSESSMENT — ENCOUNTER SYMPTOMS
DIAPHORESIS: 0
MYALGIAS: 1
ABDOMINAL PAIN: 0
CHILLS: 0
WEAKNESS: 1
SHORTNESS OF BREATH: 0
HEADACHES: 0
BACK PAIN: 1
HEARTBURN: 0
PALPITATIONS: 0
NAUSEA: 0
FOCAL WEAKNESS: 0
FEVER: 0
DEPRESSION: 0

## 2019-02-17 NOTE — PROGRESS NOTES
Report received from night RN. Assumed care of pt at 0700. Pt is A&Ox4, on room air, and reports decreased depression this morning. Pt ambulated to table by nursing station this morning around 0930. Pt reported dizziness at 0950, refer to MAR for interventions. 101 pt's blood pressure was 118/74 but was still reporting dizziness. Pt wheeled back to room to lay down. 1145 pt wanted to shower, blood pressure was 134/78. Pt showered, and dressing changed. 1:1 sitter present per Legal 2000. Pt educated on plan of care and all questions have been answered at this time. Fall precautions in place, bed alarm is on, bed in low and locked position, and hourly rounding in place. All needs have been met at this time.

## 2019-02-17 NOTE — CARE PLAN
Problem: Venous Thromboembolism (VTW)/Deep Vein Thrombosis (DVT) Prevention:  Goal: Patient will participate in Venous Thrombosis (VTE)/Deep Vein Thrombosis (DVT)Prevention Measures    Intervention: Ensure patient wears graduated elastic stockings (RADHA hose) and/or SCDs, if ordered, when in bed or chair (Remove at least once per shift for skin check)  RADHA hose in use for VTE prophylaxis       Problem: Skin Integrity  Goal: Risk for impaired skin integrity will decrease    Intervention: Implement precautions to protect skin integrity in collaboration with the interdisciplinary team  Antifungal therapy and interdry in use for panus wound, area cleansed frequently with soap and water

## 2019-02-17 NOTE — PROGRESS NOTES
[facility] Medicine Daily Progress Note    Date of Service  2/17/2019    Chief Complaint  53 y.o. male admitted 12/28/2018 with Syncope    Hospital Course  Admitted with Syncope, noted to have L1 compression fracture, work up negative except for orthostatic hypotension.  He then started complaining of suicidal thoughts.    Interval Problem Update  No new complaints  Ongoing erythema of Left groin    Consultants/Specialty  Psychiatry  Neurosurgery signed off    Code Status  Full    Disposition  Legal hold  1:1 sitter  Awaiting inpatient psychiatry placement    Review of Systems  Review of Systems   Constitutional: Negative for chills, diaphoresis, fever and malaise/fatigue.   Respiratory: Negative for shortness of breath.    Cardiovascular: Positive for leg swelling. Negative for palpitations.   Gastrointestinal: Negative for abdominal pain, heartburn and nausea.   Genitourinary: Negative for dysuria.   Musculoskeletal: Positive for back pain and myalgias.   Skin: Positive for rash. Negative for itching.   Neurological: Positive for weakness. Negative for focal weakness and headaches.   Psychiatric/Behavioral: Positive for suicidal ideas. Negative for depression.        Physical Exam  Temp:  [36.3 °C (97.3 °F)-36.7 °C (98 °F)] 36.7 °C (98 °F)  Pulse:  [] 75  Resp:  [16-17] 17  BP: (110-144)/() 110/78  SpO2:  [94 %-100 %] 94 %    Physical Exam   Constitutional: He is oriented to person, place, and time. No distress.   HENT:   Head: Normocephalic and atraumatic.   Mouth/Throat: No oropharyngeal exudate.   Eyes: Pupils are equal, round, and reactive to light. EOM are normal.   Cardiovascular: Normal rate and regular rhythm.    Pulmonary/Chest: Effort normal and breath sounds normal. No respiratory distress. He exhibits no tenderness.   Abdominal: Soft. He exhibits no distension. There is no tenderness.   Musculoskeletal: He exhibits edema.   Right 1st toe amputation   Neurological: He is alert and oriented to  person, place, and time. No cranial nerve deficit. Coordination normal.   MMT 5/5   Skin: Skin is warm and dry. Rash noted. He is not diaphoretic. There is erythema.   Venous stasis both legs   Psychiatric: He has a normal mood and affect. His behavior is normal. Judgment normal.   Nursing note and vitals reviewed.      Fluids    Intake/Output Summary (Last 24 hours) at 02/17/19 1350  Last data filed at 02/17/19 1324   Gross per 24 hour   Intake              840 ml   Output             1500 ml   Net             -660 ml       Laboratory                        Imaging  DX-HIP-UNILATERAL-WITH PELVIS-1 VIEW RIGHT   Final Result      No radiographic evidence of acute traumatic injury right hip.      DX-CHEST-PORTABLE (1 VIEW)   Final Result      Hypoinflation without other evidence for acute cardiopulmonary disease.      MR-BRAIN-W/O   Final Result      1.  MRI of the brain without contrast within normal limits for age with mild white matter changes.   2.  Chronic appearing sinus disease   3.  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 This does not have an aggressive appearance.      US-EXTREMITY VENOUS LOWER BILAT   Final Result      CT-HAND WITH RIGHT   Final Result      1.  Edema in the dorsum of the forearm and hand without a discrete fluid collection to suggest abscess is identified.      US-CAROTID DOPPLER BILAT   Final Result      DX-LUMBAR SPINE-2 OR 3 VIEWS   Final Result         Stable compression deformity in the L1 vertebral body.      CT-CTA CHEST PULMONARY ARTERY W/ RECONS   Final Result         1. No CT evidence of pulmonary embolism.      2. Old right rib fractures.      3. Cholelithiasis. Enlarged spleen with granulomas.      4. Mild superior endplate compression deformity of L1 could be acute. Correlate with point tenderness. No retropulsion. No malalignment.      CT-CSPINE WITHOUT PLUS RECONS   Final Result         1. No acute fracture from C1 through T3  is visualized.         CT-HEAD W/O   Final Result         1. No acute intracranial abnormality. No evidence of acute intracranial hemorrhage or mass lesion.               DX-THORACIC SPINE-WITH SWIMMERS VIEW   Final Result      No acute thoracic spine fracture or subluxation identified.      DX-CHEST-PORTABLE (1 VIEW)   Final Result      No acute cardiac or pulmonary abnormality is noted.           Assessment/Plan  * Orthostatic hypotension- (present on admission)   Assessment & Plan    Decreased Florinef to 0.2 mg  Midodrine adjusted parameters (Hold for SBP > 150)     Suicidal ideation   Assessment & Plan    Prozac, Risperdal  1:1 sitter, psychiatry to reassess needs    2/15 cont legal hold     Fall from ground level- (present on admission)   Assessment & Plan    PT/OT     Syncope- (present on admission)   Assessment & Plan    Autonomic dysfunction?       Debility- (present on admission)   Assessment & Plan    PT/OT     Acute respiratory failure with hypoxia (HCC)- (present on admission)   Assessment & Plan    Resolved     Lumbar compression fracture (HCC)- (present on admission)   Assessment & Plan    TLSO brace   pain control  Limit narcotics  NSAID as needed, monitor closely    Ambulating with CGA     Psychogenic nonepileptic seizure- (present on admission)   Assessment & Plan    no treatment indicated.     Chronic diastolic heart failure (HCC)- (present on admission)   Assessment & Plan    Off Lisinopril and Lasix     Venous stasis dermatitis of both lower extremities- (present on admission)   Assessment & Plan    Compression     Bilateral leg edema- (present on admission)   Assessment & Plan    compression     Vitamin B12 deficiency- (present on admission)   Assessment & Plan    oral B12     Candida infection of genital region   Assessment & Plan    - nystatin powder  - benadryl prn    2/17- ongoing erythema, maintain C/D     Paroxysmal A-fib (HCC)- (present on admission)   Assessment & Plan    Amiodarone,  ASA  [id number] - 3     Essential hypertension- (present on admission)   Assessment & Plan    Off Lisinopril     Mixed hyperlipidemia- (present on admission)   Assessment & Plan    Pravastatin       Type 2 diabetes mellitus, without long-term current use of insulin, with peripheral neuropathy (HCC)- (present on admission)   Assessment & Plan    Diet controlled     Hyponatremia- (present on admission)   Assessment & Plan    Salt tabs, follow bmp          VTE prophylaxis: Lovenox

## 2019-02-18 PROCEDURE — 700102 HCHG RX REV CODE 250 W/ 637 OVERRIDE(OP): Performed by: HOSPITALIST

## 2019-02-18 PROCEDURE — 700102 HCHG RX REV CODE 250 W/ 637 OVERRIDE(OP): Performed by: FAMILY MEDICINE

## 2019-02-18 PROCEDURE — A9270 NON-COVERED ITEM OR SERVICE: HCPCS | Performed by: HOSPITALIST

## 2019-02-18 PROCEDURE — 99224 PR SUBSEQUENT OBSERVATION CARE,LEVEL I: CPT | Performed by: INTERNAL MEDICINE

## 2019-02-18 PROCEDURE — A9270 NON-COVERED ITEM OR SERVICE: HCPCS | Performed by: FAMILY MEDICINE

## 2019-02-18 PROCEDURE — 700102 HCHG RX REV CODE 250 W/ 637 OVERRIDE(OP): Performed by: INTERNAL MEDICINE

## 2019-02-18 PROCEDURE — G0378 HOSPITAL OBSERVATION PER HR: HCPCS

## 2019-02-18 PROCEDURE — A6250 SKIN SEAL PROTECT MOISTURIZR: HCPCS | Performed by: INTERNAL MEDICINE

## 2019-02-18 PROCEDURE — 700111 HCHG RX REV CODE 636 W/ 250 OVERRIDE (IP): Performed by: FAMILY MEDICINE

## 2019-02-18 PROCEDURE — A9270 NON-COVERED ITEM OR SERVICE: HCPCS | Performed by: PSYCHIATRY & NEUROLOGY

## 2019-02-18 PROCEDURE — 700102 HCHG RX REV CODE 250 W/ 637 OVERRIDE(OP): Performed by: PSYCHIATRY & NEUROLOGY

## 2019-02-18 RX ADMIN — SODIUM CHLORIDE TAB 1 GM 1 G: 1 TAB at 12:41

## 2019-02-18 RX ADMIN — FLUDROCORTISONE ACETATE 0.2 MG: 0.1 TABLET ORAL at 05:32

## 2019-02-18 RX ADMIN — MIDODRINE HYDROCHLORIDE 10 MG: 5 TABLET ORAL at 12:38

## 2019-02-18 RX ADMIN — MIDODRINE HYDROCHLORIDE 10 MG: 5 TABLET ORAL at 17:55

## 2019-02-18 RX ADMIN — RISPERIDONE 1 MG: 0.5 TABLET ORAL at 05:30

## 2019-02-18 RX ADMIN — SODIUM CHLORIDE TAB 1 GM 1 G: 1 TAB at 18:01

## 2019-02-18 RX ADMIN — ASPIRIN 81 MG 81 MG: 81 TABLET ORAL at 05:30

## 2019-02-18 RX ADMIN — PYRIDOSTIGMINE BROMIDE 90 MG: 60 TABLET ORAL at 17:55

## 2019-02-18 RX ADMIN — CYANOCOBALAMIN TAB 500 MCG 1000 MCG: 500 TAB at 05:30

## 2019-02-18 RX ADMIN — ENOXAPARIN SODIUM 40 MG: 100 INJECTION SUBCUTANEOUS at 05:33

## 2019-02-18 RX ADMIN — PRAVASTATIN SODIUM 20 MG: 20 TABLET ORAL at 17:55

## 2019-02-18 RX ADMIN — PYRIDOSTIGMINE BROMIDE 90 MG: 60 TABLET ORAL at 12:38

## 2019-02-18 RX ADMIN — SENNOSIDES AND DOCUSATE SODIUM 2 TABLET: 8.6; 5 TABLET ORAL at 17:56

## 2019-02-18 RX ADMIN — SODIUM CHLORIDE TAB 1 GM 1 G: 1 TAB at 09:33

## 2019-02-18 RX ADMIN — NYSTATIN: 100000 POWDER TOPICAL at 17:57

## 2019-02-18 RX ADMIN — METOCLOPRAMIDE HYDROCHLORIDE 10 MG: 10 TABLET ORAL at 05:33

## 2019-02-18 RX ADMIN — RISPERIDONE 1 MG: 0.5 TABLET ORAL at 17:55

## 2019-02-18 RX ADMIN — MULTIPLE VITAMINS W/ MINERALS TAB 1 TABLET: TAB at 05:32

## 2019-02-18 RX ADMIN — METOCLOPRAMIDE HYDROCHLORIDE 10 MG: 10 TABLET ORAL at 17:55

## 2019-02-18 RX ADMIN — FLUOXETINE HYDROCHLORIDE 60 MG: 20 CAPSULE ORAL at 05:32

## 2019-02-18 RX ADMIN — NYSTATIN: 100000 POWDER TOPICAL at 05:33

## 2019-02-18 RX ADMIN — MIDODRINE HYDROCHLORIDE 10 MG: 5 TABLET ORAL at 05:30

## 2019-02-18 RX ADMIN — PYRIDOSTIGMINE BROMIDE 90 MG: 60 TABLET ORAL at 05:31

## 2019-02-18 RX ADMIN — Medication 400 MG: at 05:30

## 2019-02-18 RX ADMIN — AMIODARONE HYDROCHLORIDE 50 MG: 200 TABLET ORAL at 05:30

## 2019-02-18 RX ADMIN — METOCLOPRAMIDE HYDROCHLORIDE 10 MG: 10 TABLET ORAL at 12:39

## 2019-02-18 RX ADMIN — OXYCODONE HYDROCHLORIDE AND ACETAMINOPHEN 500 MG: 500 TABLET ORAL at 05:30

## 2019-02-18 ASSESSMENT — ENCOUNTER SYMPTOMS
FEVER: 0
CHILLS: 0
MYALGIAS: 1
DIZZINESS: 0
WEAKNESS: 1
HEADACHES: 0
PALPITATIONS: 0
DEPRESSION: 0
ABDOMINAL PAIN: 0
NAUSEA: 0
SHORTNESS OF BREATH: 0
BACK PAIN: 1
COUGH: 0

## 2019-02-18 NOTE — CARE PLAN
Problem: Safety  Goal: Will remain free from injury  Outcome: PROGRESSING AS EXPECTED  1:1 sitter at bedside     Problem: Venous Thromboembolism (VTW)/Deep Vein Thrombosis (DVT) Prevention:  Goal: Patient will participate in Venous Thrombosis (VTE)/Deep Vein Thrombosis (DVT)Prevention Measures  Outcome: PROGRESSING AS EXPECTED  RADHA hose on

## 2019-02-18 NOTE — PROGRESS NOTES
[facility] Medicine Daily Progress Note    Date of Service  2/18/2019    Chief Complaint  53 y.o. male admitted 12/28/2018 with Syncope    Hospital Course  Admitted with Syncope, noted to have L1 compression fracture, work up negative except for orthostatic hypotension.  He then started complaining of suicidal thoughts.    Interval Problem Update  Patient ambulating, sitting at nursing station  Pleasant, conversant    Consultants/Specialty  Psychiatry  Neurosurgery signed off    Code Status  Full    Disposition  Legal hold  1:1 sitter  Awaiting inpatient psychiatry placement    Review of Systems  Review of Systems   Constitutional: Negative for chills and fever.   Respiratory: Negative for cough and shortness of breath.    Cardiovascular: Positive for leg swelling. Negative for chest pain and palpitations.   Gastrointestinal: Negative for abdominal pain and nausea.   Genitourinary: Negative for dysuria and hematuria.   Musculoskeletal: Positive for back pain and myalgias.   Skin: Positive for rash.   Neurological: Positive for weakness. Negative for dizziness and headaches.   Psychiatric/Behavioral: Positive for suicidal ideas. Negative for depression.        Physical Exam  Temp:  [35.9 °C (96.7 °F)-37 °C (98.6 °F)] 37 °C (98.6 °F)  Pulse:  [62-72] 67  Resp:  [17-18] 17  BP: (105-170)/(57-94) 170/94  SpO2:  [94 %-95 %] 95 %    Physical Exam   Constitutional: He is oriented to person, place, and time. No distress.   HENT:   Head: Normocephalic and atraumatic.   Eyes: Pupils are equal, round, and reactive to light. EOM are normal. Right eye exhibits no discharge. Left eye exhibits no discharge.   Cardiovascular: Normal rate and regular rhythm.    Pulmonary/Chest: Effort normal and breath sounds normal. No respiratory distress. He has no wheezes.   Abdominal: Soft. He exhibits no distension.   Musculoskeletal: He exhibits edema. He exhibits no tenderness.   Right 1st toe amputation   Neurological: He is alert and  oriented to person, place, and time. No cranial nerve deficit. He exhibits normal muscle tone. Coordination normal.   MMT 5/5   Skin: Skin is warm and dry. Rash noted. There is erythema.   Venous stasis both legs   Psychiatric: He has a normal mood and affect. His behavior is normal. Judgment normal.   Nursing note and vitals reviewed.      Fluids    Intake/Output Summary (Last 24 hours) at 02/18/19 1500  Last data filed at 02/18/19 1458   Gross per 24 hour   Intake             1670 ml   Output             1325 ml   Net              345 ml       Laboratory                        Imaging  DX-HIP-UNILATERAL-WITH PELVIS-1 VIEW RIGHT   Final Result      No radiographic evidence of acute traumatic injury right hip.      DX-CHEST-PORTABLE (1 VIEW)   Final Result      Hypoinflation without other evidence for acute cardiopulmonary disease.      MR-BRAIN-W/O   Final Result      1.  MRI of the brain without contrast within normal limits for age with mild white matter changes.   2.  Chronic appearing sinus disease   3.  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 This does not have an aggressive appearance.      US-EXTREMITY VENOUS LOWER BILAT   Final Result      CT-HAND WITH RIGHT   Final Result      1.  Edema in the dorsum of the forearm and hand without a discrete fluid collection to suggest abscess is identified.      US-CAROTID DOPPLER BILAT   Final Result      DX-LUMBAR SPINE-2 OR 3 VIEWS   Final Result         Stable compression deformity in the L1 vertebral body.      CT-CTA CHEST PULMONARY ARTERY W/ RECONS   Final Result         1. No CT evidence of pulmonary embolism.      2. Old right rib fractures.      3. Cholelithiasis. Enlarged spleen with granulomas.      4. Mild superior endplate compression deformity of L1 could be acute. Correlate with point tenderness. No retropulsion. No malalignment.      CT-CSPINE WITHOUT PLUS RECONS   Final Result         1. No acute  fracture from C1 through T3 is visualized.         CT-HEAD W/O   Final Result         1. No acute intracranial abnormality. No evidence of acute intracranial hemorrhage or mass lesion.               DX-THORACIC SPINE-WITH SWIMMERS VIEW   Final Result      No acute thoracic spine fracture or subluxation identified.      DX-CHEST-PORTABLE (1 VIEW)   Final Result      No acute cardiac or pulmonary abnormality is noted.           Assessment/Plan  * Orthostatic hypotension- (present on admission)   Assessment & Plan    Decreased Florinef to 0.2 mg  Midodrine adjusted parameters (Hold for SBP > 150)     Suicidal ideation   Assessment & Plan    Prozac, Risperdal  1:1 sitter, psychiatry to reassess needs    2/15 cont legal hold    2/18 awaiting inpatient psych placement, patient agreeable     Fall from ground level- (present on admission)   Assessment & Plan    PT/OT     Syncope- (present on admission)   Assessment & Plan    Autonomic dysfunction?       Debility- (present on admission)   Assessment & Plan    PT/OT     Acute respiratory failure with hypoxia (HCC)- (present on admission)   Assessment & Plan    Resolved     Lumbar compression fracture (HCC)- (present on admission)   Assessment & Plan    TLSO brace   pain control  Limit narcotics  NSAID as needed, monitor closely    Ambulating with CGA     Psychogenic nonepileptic seizure- (present on admission)   Assessment & Plan    no treatment indicated.     Chronic diastolic heart failure (HCC)- (present on admission)   Assessment & Plan    Off Lisinopril and Lasix     Venous stasis dermatitis of both lower extremities- (present on admission)   Assessment & Plan    Compression     Bilateral leg edema- (present on admission)   Assessment & Plan    compression     Vitamin B12 deficiency- (present on admission)   Assessment & Plan    oral B12     Candida infection of genital region   Assessment & Plan    - nystatin powder  - benadryl prn    2/17- ongoing erythema, maintain  C/D    2/18-continue nystatin powder     Paroxysmal A-fib (HCC)- (present on admission)   Assessment & Plan    Amiodarone, ASA  [id number] - 3     Essential hypertension- (present on admission)   Assessment & Plan    Off Lisinopril     Mixed hyperlipidemia- (present on admission)   Assessment & Plan    Pravastatin       Type 2 diabetes mellitus, without long-term current use of insulin, with peripheral neuropathy (HCC)- (present on admission)   Assessment & Plan    Diet controlled     Hyponatremia- (present on admission)   Assessment & Plan    Salt tabs, follow bmp          VTE prophylaxis: Lovenox

## 2019-02-18 NOTE — DISCHARGE PLANNING
Pt ambulating in hallway independently and stopped LSW. Pt stated that he expressing increased depression again is open to going to inpt psych facility. LSW provided support and encouragement.     LSW requested CCA follow up with [facility] on referral.

## 2019-02-18 NOTE — PROGRESS NOTES
Received report from day shift RN. Assumed patient care  AOx4  No complaints of pain at this time  Room air  Interdry in place to patient's pannus   Fall precautions in place  Call light within reach  Bed locked and in low position   POC discussed with patient  All needs met at this time

## 2019-02-18 NOTE — PROGRESS NOTES
Report received from previous RN at the bedside. Assumed care of patient. Assessment completed. See doc flowsheet. IV WNL. Bed in low locked position. Call light at the bedside. Patient encouraged to call out with any needs.

## 2019-02-19 PROCEDURE — A9270 NON-COVERED ITEM OR SERVICE: HCPCS | Performed by: HOSPITALIST

## 2019-02-19 PROCEDURE — 700102 HCHG RX REV CODE 250 W/ 637 OVERRIDE(OP): Performed by: HOSPITALIST

## 2019-02-19 PROCEDURE — 700102 HCHG RX REV CODE 250 W/ 637 OVERRIDE(OP): Performed by: FAMILY MEDICINE

## 2019-02-19 PROCEDURE — A9270 NON-COVERED ITEM OR SERVICE: HCPCS | Performed by: FAMILY MEDICINE

## 2019-02-19 PROCEDURE — 700102 HCHG RX REV CODE 250 W/ 637 OVERRIDE(OP): Performed by: PSYCHIATRY & NEUROLOGY

## 2019-02-19 PROCEDURE — A9270 NON-COVERED ITEM OR SERVICE: HCPCS | Performed by: INTERNAL MEDICINE

## 2019-02-19 PROCEDURE — 700111 HCHG RX REV CODE 636 W/ 250 OVERRIDE (IP): Performed by: FAMILY MEDICINE

## 2019-02-19 PROCEDURE — G0378 HOSPITAL OBSERVATION PER HR: HCPCS

## 2019-02-19 PROCEDURE — 99225 PR SUBSEQUENT OBSERVATION CARE,LEVEL II: CPT | Performed by: INTERNAL MEDICINE

## 2019-02-19 PROCEDURE — 700102 HCHG RX REV CODE 250 W/ 637 OVERRIDE(OP): Performed by: INTERNAL MEDICINE

## 2019-02-19 PROCEDURE — A9270 NON-COVERED ITEM OR SERVICE: HCPCS | Performed by: PSYCHIATRY & NEUROLOGY

## 2019-02-19 RX ORDER — CLINDAMYCIN HYDROCHLORIDE 150 MG/1
300 CAPSULE ORAL EVERY 6 HOURS
Status: DISPENSED | OUTPATIENT
Start: 2019-02-19 | End: 2019-02-26

## 2019-02-19 RX ADMIN — SODIUM CHLORIDE TAB 1 GM 1 G: 1 TAB at 07:59

## 2019-02-19 RX ADMIN — PYRIDOSTIGMINE BROMIDE 90 MG: 60 TABLET ORAL at 05:22

## 2019-02-19 RX ADMIN — ACETAMINOPHEN 650 MG: 325 TABLET, FILM COATED ORAL at 12:35

## 2019-02-19 RX ADMIN — FLUDROCORTISONE ACETATE 0.2 MG: 0.1 TABLET ORAL at 05:21

## 2019-02-19 RX ADMIN — SODIUM CHLORIDE TAB 1 GM 1 G: 1 TAB at 11:30

## 2019-02-19 RX ADMIN — OXYCODONE HYDROCHLORIDE AND ACETAMINOPHEN 500 MG: 500 TABLET ORAL at 05:21

## 2019-02-19 RX ADMIN — NYSTATIN: 100000 POWDER TOPICAL at 23:12

## 2019-02-19 RX ADMIN — MULTIPLE VITAMINS W/ MINERALS TAB 1 TABLET: TAB at 05:21

## 2019-02-19 RX ADMIN — AMIODARONE HYDROCHLORIDE 50 MG: 200 TABLET ORAL at 05:20

## 2019-02-19 RX ADMIN — CLINDAMYCIN HYDROCHLORIDE 300 MG: 150 CAPSULE ORAL at 23:07

## 2019-02-19 RX ADMIN — PYRIDOSTIGMINE BROMIDE 90 MG: 60 TABLET ORAL at 12:21

## 2019-02-19 RX ADMIN — RISPERIDONE 1 MG: 0.5 TABLET ORAL at 18:02

## 2019-02-19 RX ADMIN — CYANOCOBALAMIN TAB 500 MCG 1000 MCG: 500 TAB at 05:22

## 2019-02-19 RX ADMIN — NYSTATIN: 100000 POWDER TOPICAL at 05:19

## 2019-02-19 RX ADMIN — ENOXAPARIN SODIUM 40 MG: 100 INJECTION SUBCUTANEOUS at 05:19

## 2019-02-19 RX ADMIN — Medication 400 MG: at 05:21

## 2019-02-19 RX ADMIN — ASPIRIN 81 MG 81 MG: 81 TABLET ORAL at 05:20

## 2019-02-19 RX ADMIN — CLINDAMYCIN HYDROCHLORIDE 300 MG: 150 CAPSULE ORAL at 18:01

## 2019-02-19 RX ADMIN — PRAVASTATIN SODIUM 20 MG: 20 TABLET ORAL at 18:02

## 2019-02-19 RX ADMIN — RISPERIDONE 1 MG: 0.5 TABLET ORAL at 06:26

## 2019-02-19 RX ADMIN — SODIUM CHLORIDE TAB 1 GM 1 G: 1 TAB at 18:01

## 2019-02-19 RX ADMIN — FLUOXETINE HYDROCHLORIDE 60 MG: 20 CAPSULE ORAL at 05:23

## 2019-02-19 RX ADMIN — METOCLOPRAMIDE HYDROCHLORIDE 10 MG: 10 TABLET ORAL at 05:20

## 2019-02-19 RX ADMIN — MIDODRINE HYDROCHLORIDE 10 MG: 5 TABLET ORAL at 05:21

## 2019-02-19 RX ADMIN — OXYCODONE HYDROCHLORIDE 5 MG: 5 TABLET ORAL at 23:11

## 2019-02-19 RX ADMIN — NYSTATIN: 100000 POWDER TOPICAL at 18:02

## 2019-02-19 RX ADMIN — MIDODRINE HYDROCHLORIDE 10 MG: 5 TABLET ORAL at 12:21

## 2019-02-19 RX ADMIN — METOCLOPRAMIDE HYDROCHLORIDE 10 MG: 10 TABLET ORAL at 18:02

## 2019-02-19 RX ADMIN — METOCLOPRAMIDE HYDROCHLORIDE 10 MG: 10 TABLET ORAL at 12:21

## 2019-02-19 ASSESSMENT — ENCOUNTER SYMPTOMS
DIAPHORESIS: 0
FEVER: 0
BACK PAIN: 1
HEARTBURN: 0
MEMORY LOSS: 0
HEADACHES: 0
NERVOUS/ANXIOUS: 0
DEPRESSION: 0
NAUSEA: 0
WEAKNESS: 1
SHORTNESS OF BREATH: 0
CHILLS: 0
MYALGIAS: 0
PALPITATIONS: 0

## 2019-02-19 NOTE — DISCHARGE PLANNING
Agency/Facility Name: [facility]  Spoke To: Lena   Outcome: Patient's referral is up for review with the medical director.

## 2019-02-19 NOTE — PROGRESS NOTES
Report received from night RN. Assumed care of pt at 0700. Pt ambulated to table by nursing station 3x. Assessment complete and labs reviewed. Pt is A&Ox4, on room air, reporting depression. Pt states that his girlfriend broke up with him this morning. 1:1 sitter in place per legal 2000. Pt educated on plan of care and all questions have been answered at this time. Fall precautions in place, bed/chair alarm on, bed is in low and locked position, hourly rounding in place.

## 2019-02-19 NOTE — DISCHARGE PLANNING
Legal Hold     Referral: Legal Hold Court     Intervention: Pt presented for legal hold meeting with  to discuss legal options of contesting hold and meeting with the court doctors tomorrow afternoon, or not contesting legal hold and continuing the hold for one week.  advised that pt's legal hold will be be continued for one week (2/28).       Plan: Pt's legal hold has been continued for one week. Pt awaiting transfer to an in patient psych facility.

## 2019-02-19 NOTE — PROGRESS NOTES
Patient continues to have suicidal thoughts. Sitter continuously at bedside and suicide precautions remain in place. Patient is alert and orientated x 4. Ambulates unit with sitter. Tolerating regular diet. Excoriation to pannus is being treated with nystatin. Fall precautions in place. Will continue to monitor and report to next shift.

## 2019-02-19 NOTE — PROGRESS NOTES
[facility] Medicine Daily Progress Note    Date of Service  2/19/2019    Chief Complaint  53 y.o. male admitted 12/28/2018 with Syncope    Hospital Course  Admitted with Syncope, noted to have L1 compression fracture, work up negative except for orthostatic hypotension.  He then started complaining of suicidal thoughts.    Interval Problem Update  No new complaints  Ongoing erythema    Consultants/Specialty  Psychiatry  Neurosurgery signed off    Code Status  Full    Disposition  Legal hold  1:1 sitter  Awaiting inpatient psychiatry placement    Review of Systems  Review of Systems   Constitutional: Negative for chills, diaphoresis, fever and malaise/fatigue.   HENT: Negative for congestion and hearing loss.    Respiratory: Negative for shortness of breath.    Cardiovascular: Positive for leg swelling. Negative for palpitations.   Gastrointestinal: Negative for heartburn and nausea.   Genitourinary: Negative for hematuria.   Musculoskeletal: Positive for back pain. Negative for myalgias.   Skin: Positive for rash.   Neurological: Positive for weakness. Negative for headaches.   Psychiatric/Behavioral: Positive for suicidal ideas. Negative for depression and memory loss. The patient is not nervous/anxious.         Physical Exam  Temp:  [35.9 °C (96.6 °F)-36.7 °C (98.1 °F)] 35.9 °C (96.6 °F)  Pulse:  [59-71] 64  Resp:  [17-18] 17  BP: (125-163)/(55-86) 153/80  SpO2:  [92 %-99 %] 92 %    Physical Exam   Constitutional: He is oriented to person, place, and time. No distress.   HENT:   Head: Normocephalic and atraumatic.   Mouth/Throat: No oropharyngeal exudate.   Eyes: Pupils are equal, round, and reactive to light. EOM are normal.   Neck: Normal range of motion. Neck supple.   Cardiovascular: Normal rate and regular rhythm.    Pulmonary/Chest: Effort normal and breath sounds normal. No respiratory distress.   Abdominal: Soft. He exhibits no distension. There is no tenderness.   Musculoskeletal: He exhibits edema. He  exhibits no tenderness.   Right 1st toe amputation   Neurological: He is alert and oriented to person, place, and time. No cranial nerve deficit. He exhibits normal muscle tone. Coordination normal.   MMT 5/5   Skin: Skin is warm and dry. Rash noted. He is not diaphoretic. There is erythema.   Venous stasis both legs   Psychiatric: He has a normal mood and affect. His behavior is normal. Judgment normal.   Nursing note and vitals reviewed.      Fluids    Intake/Output Summary (Last 24 hours) at 02/19/19 1410  Last data filed at 02/19/19 1300   Gross per 24 hour   Intake              600 ml   Output              875 ml   Net             -275 ml       Laboratory                        Imaging  DX-HIP-UNILATERAL-WITH PELVIS-1 VIEW RIGHT   Final Result      No radiographic evidence of acute traumatic injury right hip.      DX-CHEST-PORTABLE (1 VIEW)   Final Result      Hypoinflation without other evidence for acute cardiopulmonary disease.      MR-BRAIN-W/O   Final Result      1.  MRI of the brain without contrast within normal limits for age with mild white matter changes.   2.  Chronic appearing sinus disease   3.  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 This does not have an aggressive appearance.      US-EXTREMITY VENOUS LOWER BILAT   Final Result      CT-HAND WITH RIGHT   Final Result      1.  Edema in the dorsum of the forearm and hand without a discrete fluid collection to suggest abscess is identified.      US-CAROTID DOPPLER BILAT   Final Result      DX-LUMBAR SPINE-2 OR 3 VIEWS   Final Result         Stable compression deformity in the L1 vertebral body.      CT-CTA CHEST PULMONARY ARTERY W/ RECONS   Final Result         1. No CT evidence of pulmonary embolism.      2. Old right rib fractures.      3. Cholelithiasis. Enlarged spleen with granulomas.      4. Mild superior endplate compression deformity of L1 could be acute. Correlate with point tenderness. No  retropulsion. No malalignment.      CT-CSPINE WITHOUT PLUS RECONS   Final Result         1. No acute fracture from C1 through T3 is visualized.         CT-HEAD W/O   Final Result         1. No acute intracranial abnormality. No evidence of acute intracranial hemorrhage or mass lesion.               DX-THORACIC SPINE-WITH SWIMMERS VIEW   Final Result      No acute thoracic spine fracture or subluxation identified.      DX-CHEST-PORTABLE (1 VIEW)   Final Result      No acute cardiac or pulmonary abnormality is noted.           Assessment/Plan  * Orthostatic hypotension- (present on admission)   Assessment & Plan    Decreased Florinef to 0.2 mg  Midodrine adjusted parameters (Hold for SBP > 150)     Suicidal ideation   Assessment & Plan    Prozac, Risperdal  1:1 sitter, psychiatry to reassess needs    2/15 cont legal hold    2/18 awaiting inpatient psych placement, patient agreeable     Fall from ground level- (present on admission)   Assessment & Plan    PT/OT     Syncope- (present on admission)   Assessment & Plan    Autonomic dysfunction?       Debility- (present on admission)   Assessment & Plan    PT/OT     Acute respiratory failure with hypoxia (HCC)- (present on admission)   Assessment & Plan    Resolved     Lumbar compression fracture (HCC)- (present on admission)   Assessment & Plan    TLSO brace   pain control  Limit narcotics  NSAID as needed, monitor closely    Ambulating with CGA     Psychogenic nonepileptic seizure- (present on admission)   Assessment & Plan    no treatment indicated.     Chronic diastolic heart failure (HCC)- (present on admission)   Assessment & Plan    Off Lisinopril and Lasix     Venous stasis dermatitis of both lower extremities- (present on admission)   Assessment & Plan    Compression     Bilateral leg edema- (present on admission)   Assessment & Plan    compression     Vitamin B12 deficiency- (present on admission)   Assessment & Plan    oral B12     Candida infection of genital  region   Assessment & Plan    - nystatin powder  - benadryl prn    2/17- ongoing erythema, maintain C/D    2/19-continue nystatin powder  Ongoing erythema, add abx ? Secondary cellulitis  F/u am labs     Paroxysmal A-fib (HCC)- (present on admission)   Assessment & Plan    Amiodarone, ASA  [id number] - 3     Essential hypertension- (present on admission)   Assessment & Plan    Off Lisinopril     Mixed hyperlipidemia- (present on admission)   Assessment & Plan    Pravastatin       Type 2 diabetes mellitus, without long-term current use of insulin, with peripheral neuropathy (HCC)- (present on admission)   Assessment & Plan    Diet controlled     Hyponatremia- (present on admission)   Assessment & Plan    Salt tabs, follow bmp          VTE prophylaxis: Lovenox

## 2019-02-19 NOTE — CARE PLAN
Problem: Safety  Goal: Will remain free from injury    Intervention: Provide assistance with mobility  1 assist to the bathroom, maintains steady gait.       Problem: Skin Integrity  Goal: Risk for impaired skin integrity will decrease    Intervention: Assess and monitor skin integrity, appearance and/or temperature  Pannus was moist, dried and cleaned up area. Applied new interdry to pannus.

## 2019-02-19 NOTE — PROGRESS NOTES
Rec'd report from day shift RN. Assumed pt care. Assessment completed. AA&OX4. Denies pain at this time. No s/s of discomfort or distress. Pt ambulates to the bathroom w/ 1 assist and use of FWW. 1:1 sitter is at bedside for legal hold in place. Pannus area is red, raw, moist. Cleaned off pannus and applied interdry. RADHA hose placed to BLE. Bed in lowest position, bed locked, treaded socks in place, RN and CNA numbers provided, call light within reach.

## 2019-02-20 LAB
ANION GAP SERPL CALC-SCNC: 3 MMOL/L (ref 0–11.9)
BASOPHILS # BLD AUTO: 0.4 % (ref 0–1.8)
BASOPHILS # BLD: 0.03 K/UL (ref 0–0.12)
BUN SERPL-MCNC: 24 MG/DL (ref 8–22)
CALCIUM SERPL-MCNC: 9.2 MG/DL (ref 8.5–10.5)
CHLORIDE SERPL-SCNC: 104 MMOL/L (ref 96–112)
CO2 SERPL-SCNC: 27 MMOL/L (ref 20–33)
CREAT SERPL-MCNC: 0.73 MG/DL (ref 0.5–1.4)
EOSINOPHIL # BLD AUTO: 0.3 K/UL (ref 0–0.51)
EOSINOPHIL NFR BLD: 4.1 % (ref 0–6.9)
ERYTHROCYTE [DISTWIDTH] IN BLOOD BY AUTOMATED COUNT: 47.9 FL (ref 35.9–50)
GLUCOSE SERPL-MCNC: 108 MG/DL (ref 65–99)
HCT VFR BLD AUTO: 39.1 % (ref 42–52)
HGB BLD-MCNC: 12.7 G/DL (ref 14–18)
IMM GRANULOCYTES # BLD AUTO: 0.02 K/UL (ref 0–0.11)
IMM GRANULOCYTES NFR BLD AUTO: 0.3 % (ref 0–0.9)
LYMPHOCYTES # BLD AUTO: 1.9 K/UL (ref 1–4.8)
LYMPHOCYTES NFR BLD: 25.7 % (ref 22–41)
MCH RBC QN AUTO: 29.3 PG (ref 27–33)
MCHC RBC AUTO-ENTMCNC: 32.5 G/DL (ref 33.7–35.3)
MCV RBC AUTO: 90.3 FL (ref 81.4–97.8)
MONOCYTES # BLD AUTO: 0.73 K/UL (ref 0–0.85)
MONOCYTES NFR BLD AUTO: 9.9 % (ref 0–13.4)
NEUTROPHILS # BLD AUTO: 4.41 K/UL (ref 1.82–7.42)
NEUTROPHILS NFR BLD: 59.6 % (ref 44–72)
NRBC # BLD AUTO: 0 K/UL
NRBC BLD-RTO: 0 /100 WBC
PLATELET # BLD AUTO: 162 K/UL (ref 164–446)
PMV BLD AUTO: 10.3 FL (ref 9–12.9)
POTASSIUM SERPL-SCNC: 4 MMOL/L (ref 3.6–5.5)
RBC # BLD AUTO: 4.33 M/UL (ref 4.7–6.1)
SODIUM SERPL-SCNC: 134 MMOL/L (ref 135–145)
WBC # BLD AUTO: 7.4 K/UL (ref 4.8–10.8)

## 2019-02-20 PROCEDURE — 700102 HCHG RX REV CODE 250 W/ 637 OVERRIDE(OP): Performed by: HOSPITALIST

## 2019-02-20 PROCEDURE — 700102 HCHG RX REV CODE 250 W/ 637 OVERRIDE(OP): Performed by: INTERNAL MEDICINE

## 2019-02-20 PROCEDURE — 700102 HCHG RX REV CODE 250 W/ 637 OVERRIDE(OP): Performed by: PSYCHIATRY & NEUROLOGY

## 2019-02-20 PROCEDURE — A9270 NON-COVERED ITEM OR SERVICE: HCPCS | Performed by: HOSPITALIST

## 2019-02-20 PROCEDURE — 85025 COMPLETE CBC W/AUTO DIFF WBC: CPT

## 2019-02-20 PROCEDURE — A9270 NON-COVERED ITEM OR SERVICE: HCPCS | Performed by: INTERNAL MEDICINE

## 2019-02-20 PROCEDURE — A9270 NON-COVERED ITEM OR SERVICE: HCPCS | Performed by: FAMILY MEDICINE

## 2019-02-20 PROCEDURE — 99213 OFFICE O/P EST LOW 20 MIN: CPT | Performed by: PSYCHIATRY & NEUROLOGY

## 2019-02-20 PROCEDURE — 700102 HCHG RX REV CODE 250 W/ 637 OVERRIDE(OP): Performed by: FAMILY MEDICINE

## 2019-02-20 PROCEDURE — 700111 HCHG RX REV CODE 636 W/ 250 OVERRIDE (IP): Performed by: FAMILY MEDICINE

## 2019-02-20 PROCEDURE — G0378 HOSPITAL OBSERVATION PER HR: HCPCS

## 2019-02-20 PROCEDURE — 99225 PR SUBSEQUENT OBSERVATION CARE,LEVEL II: CPT | Performed by: INTERNAL MEDICINE

## 2019-02-20 PROCEDURE — 36415 COLL VENOUS BLD VENIPUNCTURE: CPT

## 2019-02-20 PROCEDURE — 80048 BASIC METABOLIC PNL TOTAL CA: CPT

## 2019-02-20 PROCEDURE — A9270 NON-COVERED ITEM OR SERVICE: HCPCS | Performed by: PSYCHIATRY & NEUROLOGY

## 2019-02-20 RX ORDER — RISPERIDONE 3 MG/1
3 TABLET ORAL EVERY EVENING
Status: DISCONTINUED | OUTPATIENT
Start: 2019-02-20 | End: 2019-02-27 | Stop reason: HOSPADM

## 2019-02-20 RX ADMIN — MIDODRINE HYDROCHLORIDE 10 MG: 5 TABLET ORAL at 11:45

## 2019-02-20 RX ADMIN — PYRIDOSTIGMINE BROMIDE 90 MG: 60 TABLET ORAL at 05:00

## 2019-02-20 RX ADMIN — METOCLOPRAMIDE HYDROCHLORIDE 10 MG: 10 TABLET ORAL at 18:09

## 2019-02-20 RX ADMIN — NYSTATIN: 100000 POWDER TOPICAL at 18:10

## 2019-02-20 RX ADMIN — RISPERIDONE 1 MG: 0.5 TABLET ORAL at 04:52

## 2019-02-20 RX ADMIN — RISPERIDONE 3 MG: 3 TABLET ORAL at 18:09

## 2019-02-20 RX ADMIN — MIDODRINE HYDROCHLORIDE 10 MG: 5 TABLET ORAL at 04:56

## 2019-02-20 RX ADMIN — PYRIDOSTIGMINE BROMIDE 90 MG: 60 TABLET ORAL at 11:44

## 2019-02-20 RX ADMIN — Medication 400 MG: at 04:54

## 2019-02-20 RX ADMIN — SODIUM CHLORIDE TAB 1 GM 1 G: 1 TAB at 06:20

## 2019-02-20 RX ADMIN — FLUDROCORTISONE ACETATE 0.2 MG: 0.1 TABLET ORAL at 05:01

## 2019-02-20 RX ADMIN — SODIUM CHLORIDE TAB 1 GM 1 G: 1 TAB at 11:44

## 2019-02-20 RX ADMIN — ENOXAPARIN SODIUM 40 MG: 100 INJECTION SUBCUTANEOUS at 04:47

## 2019-02-20 RX ADMIN — SODIUM CHLORIDE TAB 1 GM 1 G: 1 TAB at 18:09

## 2019-02-20 RX ADMIN — MIDODRINE HYDROCHLORIDE 10 MG: 5 TABLET ORAL at 18:10

## 2019-02-20 RX ADMIN — OXYCODONE HYDROCHLORIDE AND ACETAMINOPHEN 500 MG: 500 TABLET ORAL at 04:51

## 2019-02-20 RX ADMIN — METOCLOPRAMIDE HYDROCHLORIDE 10 MG: 10 TABLET ORAL at 07:00

## 2019-02-20 RX ADMIN — ASPIRIN 81 MG 81 MG: 81 TABLET ORAL at 04:52

## 2019-02-20 RX ADMIN — MULTIPLE VITAMINS W/ MINERALS TAB 1 TABLET: TAB at 04:55

## 2019-02-20 RX ADMIN — CLINDAMYCIN HYDROCHLORIDE 300 MG: 150 CAPSULE ORAL at 11:44

## 2019-02-20 RX ADMIN — CYANOCOBALAMIN TAB 500 MCG 1000 MCG: 500 TAB at 04:53

## 2019-02-20 RX ADMIN — METOCLOPRAMIDE HYDROCHLORIDE 10 MG: 10 TABLET ORAL at 11:44

## 2019-02-20 RX ADMIN — FLUOXETINE HYDROCHLORIDE 60 MG: 20 CAPSULE ORAL at 04:51

## 2019-02-20 RX ADMIN — CLINDAMYCIN HYDROCHLORIDE 300 MG: 150 CAPSULE ORAL at 04:52

## 2019-02-20 RX ADMIN — PRAVASTATIN SODIUM 20 MG: 20 TABLET ORAL at 18:10

## 2019-02-20 RX ADMIN — CLINDAMYCIN HYDROCHLORIDE 300 MG: 150 CAPSULE ORAL at 18:09

## 2019-02-20 RX ADMIN — AMIODARONE HYDROCHLORIDE 50 MG: 200 TABLET ORAL at 04:54

## 2019-02-20 RX ADMIN — PYRIDOSTIGMINE BROMIDE 90 MG: 60 TABLET ORAL at 18:09

## 2019-02-20 RX ADMIN — NYSTATIN: 100000 POWDER TOPICAL at 04:47

## 2019-02-20 ASSESSMENT — ENCOUNTER SYMPTOMS
NAUSEA: 0
FEVER: 0
HEADACHES: 0
ABDOMINAL PAIN: 0
DIZZINESS: 0
SPEECH CHANGE: 0
CHILLS: 0
NERVOUS/ANXIOUS: 0
SHORTNESS OF BREATH: 0
PALPITATIONS: 0
COUGH: 0
MYALGIAS: 0
DEPRESSION: 0
MEMORY LOSS: 0
SENSORY CHANGE: 0
WEAKNESS: 1
BACK PAIN: 1

## 2019-02-20 NOTE — CARE PLAN
Problem: Safety  Goal: Will remain free from injury  Outcome: PROGRESSING AS EXPECTED  Pt has a 1:1 sitter, bed in lowest position, call bell in reach, treaded socks.     Problem: Psychosocial Needs:  Goal: Level of anxiety will decrease  Outcome: PROGRESSING AS EXPECTED  Pt encouraged to discuss symptoms of anxiety with staff member. Continue on prescribed medications for anxiety. Continue 1:1 sitter for safety

## 2019-02-20 NOTE — PROGRESS NOTES
Legal hold in place; 1:1 sitter in direct observation of patient at all times since start of shift. All needs being met at this time.

## 2019-02-20 NOTE — PROGRESS NOTES
Received bedside report from night RN. Pt has 1:1 sitter at bedside for suicide prevention. Pt awake and alert. No complaints of pain. Pt stated feeling good today.

## 2019-02-20 NOTE — PROGRESS NOTES
Met with patient during bedside report. Pt denies pain, no needs at this time. Bed low and locked, call light within reach. Instructed to call for assistance. Sitter at bedside for safety, SI.

## 2019-02-21 PROCEDURE — 700102 HCHG RX REV CODE 250 W/ 637 OVERRIDE(OP): Performed by: INTERNAL MEDICINE

## 2019-02-21 PROCEDURE — 99225 PR SUBSEQUENT OBSERVATION CARE,LEVEL II: CPT | Performed by: INTERNAL MEDICINE

## 2019-02-21 PROCEDURE — 700102 HCHG RX REV CODE 250 W/ 637 OVERRIDE(OP): Performed by: FAMILY MEDICINE

## 2019-02-21 PROCEDURE — 700102 HCHG RX REV CODE 250 W/ 637 OVERRIDE(OP): Performed by: HOSPITALIST

## 2019-02-21 PROCEDURE — A9270 NON-COVERED ITEM OR SERVICE: HCPCS | Performed by: HOSPITALIST

## 2019-02-21 PROCEDURE — 306311 ANTI-EMBOLISM STOCKINGS XXLRG LNG: Performed by: INTERNAL MEDICINE

## 2019-02-21 PROCEDURE — A9270 NON-COVERED ITEM OR SERVICE: HCPCS | Performed by: FAMILY MEDICINE

## 2019-02-21 PROCEDURE — 700111 HCHG RX REV CODE 636 W/ 250 OVERRIDE (IP): Performed by: FAMILY MEDICINE

## 2019-02-21 PROCEDURE — A9270 NON-COVERED ITEM OR SERVICE: HCPCS | Performed by: PSYCHIATRY & NEUROLOGY

## 2019-02-21 PROCEDURE — G0378 HOSPITAL OBSERVATION PER HR: HCPCS

## 2019-02-21 PROCEDURE — 700102 HCHG RX REV CODE 250 W/ 637 OVERRIDE(OP): Performed by: PSYCHIATRY & NEUROLOGY

## 2019-02-21 PROCEDURE — A9270 NON-COVERED ITEM OR SERVICE: HCPCS | Performed by: INTERNAL MEDICINE

## 2019-02-21 RX ADMIN — MIDODRINE HYDROCHLORIDE 10 MG: 5 TABLET ORAL at 10:43

## 2019-02-21 RX ADMIN — PRAVASTATIN SODIUM 20 MG: 20 TABLET ORAL at 18:19

## 2019-02-21 RX ADMIN — SODIUM CHLORIDE TAB 1 GM 1 G: 1 TAB at 10:42

## 2019-02-21 RX ADMIN — ASPIRIN 81 MG 81 MG: 81 TABLET ORAL at 05:32

## 2019-02-21 RX ADMIN — SODIUM CHLORIDE TAB 1 GM 1 G: 1 TAB at 18:19

## 2019-02-21 RX ADMIN — MIDODRINE HYDROCHLORIDE 10 MG: 5 TABLET ORAL at 05:34

## 2019-02-21 RX ADMIN — MULTIPLE VITAMINS W/ MINERALS TAB 1 TABLET: TAB at 05:33

## 2019-02-21 RX ADMIN — CLINDAMYCIN HYDROCHLORIDE 300 MG: 150 CAPSULE ORAL at 18:18

## 2019-02-21 RX ADMIN — OXYCODONE HYDROCHLORIDE AND ACETAMINOPHEN 500 MG: 500 TABLET ORAL at 05:33

## 2019-02-21 RX ADMIN — FLUOXETINE HYDROCHLORIDE 60 MG: 20 CAPSULE ORAL at 05:31

## 2019-02-21 RX ADMIN — PYRIDOSTIGMINE BROMIDE 90 MG: 60 TABLET ORAL at 05:34

## 2019-02-21 RX ADMIN — AMIODARONE HYDROCHLORIDE 50 MG: 200 TABLET ORAL at 05:32

## 2019-02-21 RX ADMIN — SODIUM CHLORIDE TAB 1 GM 1 G: 1 TAB at 14:09

## 2019-02-21 RX ADMIN — Medication 400 MG: at 05:34

## 2019-02-21 RX ADMIN — FLUDROCORTISONE ACETATE 0.2 MG: 0.1 TABLET ORAL at 05:31

## 2019-02-21 RX ADMIN — METOCLOPRAMIDE HYDROCHLORIDE 10 MG: 10 TABLET ORAL at 10:42

## 2019-02-21 RX ADMIN — CYANOCOBALAMIN TAB 500 MCG 1000 MCG: 500 TAB at 05:32

## 2019-02-21 RX ADMIN — NYSTATIN: 100000 POWDER TOPICAL at 18:22

## 2019-02-21 RX ADMIN — PYRIDOSTIGMINE BROMIDE 90 MG: 60 TABLET ORAL at 14:09

## 2019-02-21 RX ADMIN — NYSTATIN: 100000 POWDER TOPICAL at 05:33

## 2019-02-21 RX ADMIN — ACETAMINOPHEN 650 MG: 325 TABLET, FILM COATED ORAL at 10:50

## 2019-02-21 RX ADMIN — CLINDAMYCIN HYDROCHLORIDE 300 MG: 150 CAPSULE ORAL at 05:32

## 2019-02-21 RX ADMIN — METOCLOPRAMIDE HYDROCHLORIDE 10 MG: 10 TABLET ORAL at 18:19

## 2019-02-21 RX ADMIN — CLINDAMYCIN HYDROCHLORIDE 300 MG: 150 CAPSULE ORAL at 10:44

## 2019-02-21 RX ADMIN — RISPERIDONE 3 MG: 3 TABLET ORAL at 20:52

## 2019-02-21 RX ADMIN — ENOXAPARIN SODIUM 40 MG: 100 INJECTION SUBCUTANEOUS at 05:31

## 2019-02-21 RX ADMIN — CLINDAMYCIN HYDROCHLORIDE 300 MG: 150 CAPSULE ORAL at 01:38

## 2019-02-21 RX ADMIN — PYRIDOSTIGMINE BROMIDE 90 MG: 60 TABLET ORAL at 20:52

## 2019-02-21 ASSESSMENT — ENCOUNTER SYMPTOMS
DEPRESSION: 0
FOCAL WEAKNESS: 0
WEAKNESS: 1
HEADACHES: 0
BACK PAIN: 1
FEVER: 0
CHILLS: 0
HEARTBURN: 0
SHORTNESS OF BREATH: 0
ABDOMINAL PAIN: 0
NERVOUS/ANXIOUS: 0
MYALGIAS: 0
MEMORY LOSS: 0
FALLS: 0

## 2019-02-21 NOTE — PROGRESS NOTES
[facility] Medicine Daily Progress Note    Date of Service  2/21/2019    Chief Complaint  53 y.o. male admitted 12/28/2018 with Syncope    Hospital Course  Admitted with Syncope, noted to have L1 compression fracture, work up negative except for orthostatic hypotension.  He then started complaining of suicidal thoughts.    Interval Problem Update  Improving right inner thigh edema  ambulating    Consultants/Specialty  Psychiatry  Neurosurgery signed off    Code Status  Full    Disposition  Legal hold  1:1 sitter  Awaiting inpatient psychiatry placement    Review of Systems  Review of Systems   Constitutional: Negative for chills, fever and malaise/fatigue.   Respiratory: Negative for shortness of breath.    Cardiovascular: Positive for leg swelling.   Gastrointestinal: Negative for abdominal pain and heartburn.   Genitourinary: Negative for hematuria and urgency.   Musculoskeletal: Positive for back pain. Negative for falls and myalgias.   Skin: Positive for rash.   Neurological: Positive for weakness. Negative for focal weakness and headaches.   Psychiatric/Behavioral: Positive for suicidal ideas. Negative for depression and memory loss. The patient is not nervous/anxious.         Physical Exam  Temp:  [36.4 °C (97.5 °F)-36.8 °C (98.3 °F)] 36.8 °C (98.3 °F)  Pulse:  [62-94] 64  Resp:  [17-18] 17  BP: ()/(61-82) 142/82  SpO2:  [93 %-97 %] 96 %    Physical Exam   Constitutional: He is oriented to person, place, and time. No distress.   HENT:   Head: Normocephalic and atraumatic.   Mouth/Throat: No oropharyngeal exudate.   Eyes: Pupils are equal, round, and reactive to light. EOM are normal. Right eye exhibits no discharge. Left eye exhibits no discharge.   Neck: Normal range of motion. Neck supple.   Cardiovascular: Normal rate and regular rhythm.    Pulmonary/Chest: Effort normal. No respiratory distress. He has no wheezes.   Abdominal: Soft. He exhibits no distension. There is no tenderness.   Musculoskeletal:  He exhibits edema. He exhibits no tenderness.   Right 1st toe amputation   Neurological: He is alert and oriented to person, place, and time. No cranial nerve deficit. He exhibits normal muscle tone.   MMT 5/5   Skin: Skin is warm and dry. There is erythema.   Venous stasis both legs   Psychiatric: He has a normal mood and affect. His behavior is normal. Judgment and thought content normal.   Nursing note and vitals reviewed.      Fluids    Intake/Output Summary (Last 24 hours) at 02/21/19 1446  Last data filed at 02/21/19 1440   Gross per 24 hour   Intake             1970 ml   Output             1500 ml   Net              470 ml       Laboratory  Recent Labs      02/20/19   0045   WBC  7.4   RBC  4.33*   HEMOGLOBIN  12.7*   HEMATOCRIT  39.1*   MCV  90.3   MCH  29.3   MCHC  32.5*   RDW  47.9   PLATELETCT  162*   MPV  10.3     Recent Labs      02/20/19   0045   SODIUM  134*   POTASSIUM  4.0   CHLORIDE  104   CO2  27   GLUCOSE  108*   BUN  24*   CREATININE  0.73   CALCIUM  9.2                   Imaging  DX-HIP-UNILATERAL-WITH PELVIS-1 VIEW RIGHT   Final Result      No radiographic evidence of acute traumatic injury right hip.      DX-CHEST-PORTABLE (1 VIEW)   Final Result      Hypoinflation without other evidence for acute cardiopulmonary disease.      MR-BRAIN-W/O   Final Result      1.  MRI of the brain without contrast within normal limits for age with mild white matter changes.   2.  Chronic appearing sinus disease   3.  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 This does not have an aggressive appearance.      US-EXTREMITY VENOUS LOWER BILAT   Final Result      CT-HAND WITH RIGHT   Final Result      1.  Edema in the dorsum of the forearm and hand without a discrete fluid collection to suggest abscess is identified.      US-CAROTID DOPPLER BILAT   Final Result      DX-LUMBAR SPINE-2 OR 3 VIEWS   Final Result         Stable compression deformity in the L1 vertebral  body.      CT-CTA CHEST PULMONARY ARTERY W/ RECONS   Final Result         1. No CT evidence of pulmonary embolism.      2. Old right rib fractures.      3. Cholelithiasis. Enlarged spleen with granulomas.      4. Mild superior endplate compression deformity of L1 could be acute. Correlate with point tenderness. No retropulsion. No malalignment.      CT-CSPINE WITHOUT PLUS RECONS   Final Result         1. No acute fracture from C1 through T3 is visualized.         CT-HEAD W/O   Final Result         1. No acute intracranial abnormality. No evidence of acute intracranial hemorrhage or mass lesion.               DX-THORACIC SPINE-WITH SWIMMERS VIEW   Final Result      No acute thoracic spine fracture or subluxation identified.      DX-CHEST-PORTABLE (1 VIEW)   Final Result      No acute cardiac or pulmonary abnormality is noted.           Assessment/Plan  * Orthostatic hypotension- (present on admission)   Assessment & Plan    Decreased Florinef to 0.2 mg  Midodrine adjusted parameters (Hold for SBP > 150)     Suicidal ideation   Assessment & Plan    Prozac, Risperdal  1:1 sitter, psychiatry to reassess needs    2/15 cont legal hold    2/18 awaiting inpatient psych placement, patient agreeable     Fall from ground level- (present on admission)   Assessment & Plan    PT/OT     Syncope- (present on admission)   Assessment & Plan    Autonomic dysfunction?       Debility- (present on admission)   Assessment & Plan    PT/OT     Acute respiratory failure with hypoxia (HCC)- (present on admission)   Assessment & Plan    Resolved     Lumbar compression fracture (HCC)- (present on admission)   Assessment & Plan    TLSO brace   pain control  Limit narcotics  NSAID as needed, monitor closely    Ambulating with CGA     Psychogenic nonepileptic seizure- (present on admission)   Assessment & Plan    no treatment indicated.     Chronic diastolic heart failure (HCC)- (present on admission)   Assessment & Plan    Off Lisinopril and  Lasix     Venous stasis dermatitis of both lower extremities- (present on admission)   Assessment & Plan    Compression     Bilateral leg edema- (present on admission)   Assessment & Plan    compression     Vitamin B12 deficiency- (present on admission)   Assessment & Plan    oral B12     Candida infection of genital region   Assessment & Plan    - nystatin powder  - benadryl prn    2/17- ongoing erythema, maintain C/D    2/19-continue nystatin powder  Ongoing erythema, add abx ? Secondary cellulitis  F/u am labs    2/20 on clindamycin and nystatin powder  Improving    2/21  Improving erythema, to complete 7 days of clindamycin       Paroxysmal A-fib (HCC)- (present on admission)   Assessment & Plan    Amiodarone, ASA  [id number] - 3     Essential hypertension- (present on admission)   Assessment & Plan    Off Lisinopril     Mixed hyperlipidemia- (present on admission)   Assessment & Plan    Pravastatin       Type 2 diabetes mellitus, without long-term current use of insulin, with peripheral neuropathy (HCC)- (present on admission)   Assessment & Plan    Diet controlled     Hyponatremia- (present on admission)   Assessment & Plan    Salt tabs, follow bmp          VTE prophylaxis: Lovenox

## 2019-02-21 NOTE — PROGRESS NOTES
Assumed patient care at 1900. Received Bedside report. Patient in bed, sitter 1-1 at bedside per legal hold/psych, alert and oriented x 4. Discussed POC. No s/s of distress. Patient denies any further questions or concerns. Call light in reach, fall precautions in place. Continue hourly rounding. /72   Pulse 65   Temp 36.4 °C (97.5 °F) (Temporal)   Resp 18   Ht 1.829 m (6')   Wt 118.7 kg (261 lb 11 oz)   SpO2 97%   BMI 35.49 kg/m²

## 2019-02-21 NOTE — PROGRESS NOTES
[facility] Medicine Daily Progress Note    Date of Service  2/20/2019    Chief Complaint  53 y.o. male admitted 12/28/2018 with Syncope    Hospital Course  Admitted with Syncope, noted to have L1 compression fracture, work up negative except for orthostatic hypotension.  He then started complaining of suicidal thoughts.    Interval Problem Update  Patient reports improving groin erythema and pruritus  Awaiting inpatient psych  Has one-to-one sitter    Consultants/Specialty  Psychiatry  Neurosurgery signed off    Code Status  Full    Disposition  Legal hold  1:1 sitter  Awaiting inpatient psychiatry placement    Review of Systems  Review of Systems   Constitutional: Negative for chills and fever.   HENT: Negative for congestion.    Respiratory: Negative for cough and shortness of breath.    Cardiovascular: Positive for leg swelling. Negative for palpitations.   Gastrointestinal: Negative for abdominal pain and nausea.   Genitourinary: Negative for dysuria and hematuria.   Musculoskeletal: Positive for back pain. Negative for myalgias.   Skin: Positive for rash.   Neurological: Positive for weakness. Negative for dizziness, sensory change, speech change and headaches.   Psychiatric/Behavioral: Positive for suicidal ideas. Negative for depression and memory loss. The patient is not nervous/anxious.         Physical Exam  Temp:  [36.1 °C (97 °F)-37.3 °C (99.1 °F)] 36.8 °C (98.3 °F)  Pulse:  [62-87] 87  Resp:  [17-18] 17  BP: (121-152)/(50-85) 123/69  SpO2:  [93 %-98 %] 95 %    Physical Exam   Constitutional: He is oriented to person, place, and time. No distress.   HENT:   Head: Normocephalic and atraumatic.   Eyes: Pupils are equal, round, and reactive to light. EOM are normal.   Neck: Normal range of motion. Neck supple.   Cardiovascular: Normal rate and regular rhythm.    Pulmonary/Chest: Effort normal. No respiratory distress.   Abdominal: Soft. He exhibits no distension.   Musculoskeletal: He exhibits edema. He  exhibits no tenderness.   Right 1st toe amputation   Neurological: He is alert and oriented to person, place, and time. No cranial nerve deficit. Coordination normal.   MMT 5/5   Skin: Skin is warm and dry. Rash noted. He is not diaphoretic. There is erythema.   Venous stasis both legs   Psychiatric: He has a normal mood and affect. His behavior is normal. Judgment normal.   Nursing note and vitals reviewed.      Fluids    Intake/Output Summary (Last 24 hours) at 02/20/19 1608  Last data filed at 02/20/19 1503   Gross per 24 hour   Intake             1800 ml   Output             3275 ml   Net            -1475 ml       Laboratory  Recent Labs      02/20/19   0045   WBC  7.4   RBC  4.33*   HEMOGLOBIN  12.7*   HEMATOCRIT  39.1*   MCV  90.3   MCH  29.3   MCHC  32.5*   RDW  47.9   PLATELETCT  162*   MPV  10.3     Recent Labs      02/20/19   0045   SODIUM  134*   POTASSIUM  4.0   CHLORIDE  104   CO2  27   GLUCOSE  108*   BUN  24*   CREATININE  0.73   CALCIUM  9.2                   Imaging  DX-HIP-UNILATERAL-WITH PELVIS-1 VIEW RIGHT   Final Result      No radiographic evidence of acute traumatic injury right hip.      DX-CHEST-PORTABLE (1 VIEW)   Final Result      Hypoinflation without other evidence for acute cardiopulmonary disease.      MR-BRAIN-W/O   Final Result      1.  MRI of the brain without contrast within normal limits for age with mild white matter changes.   2.  Chronic appearing sinus disease   3.  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 This does not have an aggressive appearance.      US-EXTREMITY VENOUS LOWER BILAT   Final Result      CT-HAND WITH RIGHT   Final Result      1.  Edema in the dorsum of the forearm and hand without a discrete fluid collection to suggest abscess is identified.      US-CAROTID DOPPLER BILAT   Final Result      DX-LUMBAR SPINE-2 OR 3 VIEWS   Final Result         Stable compression deformity in the L1 vertebral body.      CT-CTA  CHEST PULMONARY ARTERY W/ RECONS   Final Result         1. No CT evidence of pulmonary embolism.      2. Old right rib fractures.      3. Cholelithiasis. Enlarged spleen with granulomas.      4. Mild superior endplate compression deformity of L1 could be acute. Correlate with point tenderness. No retropulsion. No malalignment.      CT-CSPINE WITHOUT PLUS RECONS   Final Result         1. No acute fracture from C1 through T3 is visualized.         CT-HEAD W/O   Final Result         1. No acute intracranial abnormality. No evidence of acute intracranial hemorrhage or mass lesion.               DX-THORACIC SPINE-WITH SWIMMERS VIEW   Final Result      No acute thoracic spine fracture or subluxation identified.      DX-CHEST-PORTABLE (1 VIEW)   Final Result      No acute cardiac or pulmonary abnormality is noted.           Assessment/Plan  * Orthostatic hypotension- (present on admission)   Assessment & Plan    Decreased Florinef to 0.2 mg  Midodrine adjusted parameters (Hold for SBP > 150)     Suicidal ideation   Assessment & Plan    Prozac, Risperdal  1:1 sitter, psychiatry to reassess needs    2/15 cont legal hold    2/18 awaiting inpatient psych placement, patient agreeable     Fall from ground level- (present on admission)   Assessment & Plan    PT/OT     Syncope- (present on admission)   Assessment & Plan    Autonomic dysfunction?       Debility- (present on admission)   Assessment & Plan    PT/OT     Acute respiratory failure with hypoxia (HCC)- (present on admission)   Assessment & Plan    Resolved     Lumbar compression fracture (HCC)- (present on admission)   Assessment & Plan    TLSO brace   pain control  Limit narcotics  NSAID as needed, monitor closely    Ambulating with CGA     Psychogenic nonepileptic seizure- (present on admission)   Assessment & Plan    no treatment indicated.     Chronic diastolic heart failure (HCC)- (present on admission)   Assessment & Plan    Off Lisinopril and Lasix     Venous  stasis dermatitis of both lower extremities- (present on admission)   Assessment & Plan    Compression     Bilateral leg edema- (present on admission)   Assessment & Plan    compression     Vitamin B12 deficiency- (present on admission)   Assessment & Plan    oral B12     Candida infection of genital region   Assessment & Plan    - nystatin powder  - benadryl prn    2/17- ongoing erythema, maintain C/D    2/19-continue nystatin powder  Ongoing erythema, add abx ? Secondary cellulitis  F/u am labs    2/20 on clindamycin and nystatin powder  Improving       Paroxysmal A-fib (HCC)- (present on admission)   Assessment & Plan    Amiodarone, ASA  [id number] - 3     Essential hypertension- (present on admission)   Assessment & Plan    Off Lisinopril     Mixed hyperlipidemia- (present on admission)   Assessment & Plan    Pravastatin       Type 2 diabetes mellitus, without long-term current use of insulin, with peripheral neuropathy (HCC)- (present on admission)   Assessment & Plan    Diet controlled     Hyponatremia- (present on admission)   Assessment & Plan    Salt tabs, follow bmp          VTE prophylaxis: Lovenox

## 2019-02-21 NOTE — CARE PLAN
Problem: Communication  Goal: The ability to communicate needs accurately and effectively will improve  Outcome: PROGRESSING AS EXPECTED  Engage patient in conversation, encourage patient to voice feelings, provided therapeutic communication. No harmful or threatening statements made by patient.     Problem: Skin Integrity  Goal: Risk for impaired skin integrity will decrease  · 2 RN skin check complete.   · Devices in place NA.  · Skin assessed under devices NA.  · Confirmed pressure ulcers found on NA.  · New potential pressure ulcers noted on NA. Wound consult placed? NA. Photo uploaded? NA. MIDAS completed? NA  · The following interventions are in place NA.  · Pannus/periarea reddened. Interdry in place, nystatin powder as ordered. Educated patient on keeping clean and dry.

## 2019-02-21 NOTE — PROGRESS NOTES
Pt received shower this afternoon. Pt's panus, scrotum and between his legs are reddened.  After shower applied nystatin powder on panus area, around scrotum, and between his thighs. Applied Interdry cloth under panus.

## 2019-02-21 NOTE — PROGRESS NOTES
· 2 RN skin check complete with omkar RN.   · Devices in place NA.  · Skin assessed under devices NA.  · Confirmed pressure ulcers found on NA.  · New potential pressure ulcers noted on NA. Wound consult placed? NA. Photo uploaded? NA. MIDAS completed? NA  · The following interventions are in place NA.  · Pannus/periarea reddened. Interdry in place, nystatin powder as ordered. Educated patient on keeping clean and dry.

## 2019-02-22 PROCEDURE — G0378 HOSPITAL OBSERVATION PER HR: HCPCS

## 2019-02-22 PROCEDURE — 99213 OFFICE O/P EST LOW 20 MIN: CPT | Performed by: PSYCHIATRY & NEUROLOGY

## 2019-02-22 PROCEDURE — A9270 NON-COVERED ITEM OR SERVICE: HCPCS | Performed by: HOSPITALIST

## 2019-02-22 PROCEDURE — A9270 NON-COVERED ITEM OR SERVICE: HCPCS | Performed by: FAMILY MEDICINE

## 2019-02-22 PROCEDURE — 700102 HCHG RX REV CODE 250 W/ 637 OVERRIDE(OP): Performed by: HOSPITALIST

## 2019-02-22 PROCEDURE — A9270 NON-COVERED ITEM OR SERVICE: HCPCS | Performed by: INTERNAL MEDICINE

## 2019-02-22 PROCEDURE — 700102 HCHG RX REV CODE 250 W/ 637 OVERRIDE(OP): Performed by: FAMILY MEDICINE

## 2019-02-22 PROCEDURE — 700111 HCHG RX REV CODE 636 W/ 250 OVERRIDE (IP): Performed by: FAMILY MEDICINE

## 2019-02-22 PROCEDURE — 99224 PR SUBSEQUENT OBSERVATION CARE,LEVEL I: CPT | Performed by: INTERNAL MEDICINE

## 2019-02-22 PROCEDURE — 700102 HCHG RX REV CODE 250 W/ 637 OVERRIDE(OP): Performed by: INTERNAL MEDICINE

## 2019-02-22 PROCEDURE — 700102 HCHG RX REV CODE 250 W/ 637 OVERRIDE(OP): Performed by: PSYCHIATRY & NEUROLOGY

## 2019-02-22 PROCEDURE — A9270 NON-COVERED ITEM OR SERVICE: HCPCS | Performed by: PSYCHIATRY & NEUROLOGY

## 2019-02-22 RX ADMIN — NYSTATIN: 100000 POWDER TOPICAL at 06:18

## 2019-02-22 RX ADMIN — NYSTATIN: 100000 POWDER TOPICAL at 18:32

## 2019-02-22 RX ADMIN — SODIUM CHLORIDE TAB 1 GM 1 G: 1 TAB at 11:11

## 2019-02-22 RX ADMIN — CLINDAMYCIN HYDROCHLORIDE 300 MG: 150 CAPSULE ORAL at 11:11

## 2019-02-22 RX ADMIN — AMIODARONE HYDROCHLORIDE 50 MG: 200 TABLET ORAL at 06:19

## 2019-02-22 RX ADMIN — CLINDAMYCIN HYDROCHLORIDE 300 MG: 150 CAPSULE ORAL at 01:36

## 2019-02-22 RX ADMIN — CLINDAMYCIN HYDROCHLORIDE 300 MG: 150 CAPSULE ORAL at 19:27

## 2019-02-22 RX ADMIN — PYRIDOSTIGMINE BROMIDE 90 MG: 60 TABLET ORAL at 18:31

## 2019-02-22 RX ADMIN — ASPIRIN 81 MG 81 MG: 81 TABLET ORAL at 06:19

## 2019-02-22 RX ADMIN — FLUDROCORTISONE ACETATE 0.2 MG: 0.1 TABLET ORAL at 06:18

## 2019-02-22 RX ADMIN — MULTIPLE VITAMINS W/ MINERALS TAB 1 TABLET: TAB at 06:19

## 2019-02-22 RX ADMIN — Medication 400 MG: at 06:19

## 2019-02-22 RX ADMIN — SODIUM CHLORIDE TAB 1 GM 1 G: 1 TAB at 09:11

## 2019-02-22 RX ADMIN — METOCLOPRAMIDE HYDROCHLORIDE 10 MG: 10 TABLET ORAL at 18:30

## 2019-02-22 RX ADMIN — PYRIDOSTIGMINE BROMIDE 90 MG: 60 TABLET ORAL at 11:10

## 2019-02-22 RX ADMIN — PYRIDOSTIGMINE BROMIDE 90 MG: 60 TABLET ORAL at 06:20

## 2019-02-22 RX ADMIN — METOCLOPRAMIDE HYDROCHLORIDE 10 MG: 10 TABLET ORAL at 11:12

## 2019-02-22 RX ADMIN — MIDODRINE HYDROCHLORIDE 10 MG: 5 TABLET ORAL at 18:31

## 2019-02-22 RX ADMIN — METOCLOPRAMIDE HYDROCHLORIDE 10 MG: 10 TABLET ORAL at 09:11

## 2019-02-22 RX ADMIN — SODIUM CHLORIDE TAB 1 GM 1 G: 1 TAB at 18:31

## 2019-02-22 RX ADMIN — ENOXAPARIN SODIUM 40 MG: 100 INJECTION SUBCUTANEOUS at 06:18

## 2019-02-22 RX ADMIN — OXYCODONE HYDROCHLORIDE AND ACETAMINOPHEN 500 MG: 500 TABLET ORAL at 06:19

## 2019-02-22 RX ADMIN — RISPERIDONE 3 MG: 3 TABLET ORAL at 18:34

## 2019-02-22 RX ADMIN — FLUOXETINE HYDROCHLORIDE 60 MG: 20 CAPSULE ORAL at 06:18

## 2019-02-22 RX ADMIN — CLINDAMYCIN HYDROCHLORIDE 300 MG: 150 CAPSULE ORAL at 06:18

## 2019-02-22 RX ADMIN — MIDODRINE HYDROCHLORIDE 10 MG: 5 TABLET ORAL at 11:12

## 2019-02-22 RX ADMIN — CYANOCOBALAMIN TAB 500 MCG 1000 MCG: 500 TAB at 06:18

## 2019-02-22 RX ADMIN — OXYCODONE HYDROCHLORIDE 5 MG: 5 TABLET ORAL at 09:11

## 2019-02-22 RX ADMIN — OXYCODONE HYDROCHLORIDE 5 MG: 5 TABLET ORAL at 01:36

## 2019-02-22 RX ADMIN — PRAVASTATIN SODIUM 20 MG: 20 TABLET ORAL at 18:31

## 2019-02-22 ASSESSMENT — ENCOUNTER SYMPTOMS
DIZZINESS: 0
CHILLS: 0
HEADACHES: 0
WEAKNESS: 0
ABDOMINAL PAIN: 0
MEMORY LOSS: 0
FEVER: 0
SHORTNESS OF BREATH: 0
BACK PAIN: 1
NERVOUS/ANXIOUS: 0
MYALGIAS: 0

## 2019-02-22 NOTE — PROGRESS NOTES
Report received. Assumed care at 0700, assessment complete. Pt is A&O x 4. 1/10 pain at this time. Patient instructed on the plan of care for the day including plan to shower and ambulate halls. Bed in lowest position. Bed alarm on. Safety sitter at bedside. Call light within reach. Patient provided RN and CNA extension.

## 2019-02-22 NOTE — CARE PLAN
Problem: Skin Integrity  Goal: Risk for impaired skin integrity will decrease    Intervention: Implement precautions to protect skin integrity in collaboration with the interdisciplinary team  Pt educated on use and indication for interdry.  Educated that they are washable and reusable.  Provided an additional interdry.

## 2019-02-22 NOTE — PROGRESS NOTES
Assumed patient care at 1900. Received Bedside report. Patient with 1-1 sitter at bedside d/t legal hold/SI, alert and oriented x 4. Discussed POC. No s/s of distress. Patient denies any further questions or concerns. Call light in reach, fall precautions in place. Continue hourly rounding. /73   Pulse 62   Temp 36.6 °C (97.9 °F) (Temporal)   Resp 17   Ht 1.829 m (6')   Wt 111.8 kg (246 lb 7.6 oz)   SpO2 100%   BMI 33.43 kg/m²

## 2019-02-22 NOTE — PROGRESS NOTES
[facility] Medicine Daily Progress Note    Date of Service  2/22/2019    Chief Complaint  53 y.o. male admitted 12/28/2018 with Syncope    Hospital Course  Admitted with Syncope, noted to have L1 compression fracture, work up negative except for orthostatic hypotension.  He then started complaining of suicidal thoughts.    Interval Problem Update  No new events    Consultants/Specialty  Psychiatry  Neurosurgery signed off    Code Status  Full    Disposition  Legal hold  1:1 sitter  Awaiting inpatient psychiatry placement    Review of Systems  Review of Systems   Constitutional: Negative for chills, fever and malaise/fatigue.   Respiratory: Negative for shortness of breath.    Cardiovascular: Positive for leg swelling.   Gastrointestinal: Negative for abdominal pain.   Genitourinary: Negative for hematuria and urgency.   Musculoskeletal: Positive for back pain. Negative for myalgias.   Neurological: Negative for dizziness, weakness and headaches.   Psychiatric/Behavioral: Positive for suicidal ideas. Negative for memory loss. The patient is not nervous/anxious.         Physical Exam  Temp:  [36.4 °C (97.5 °F)-36.9 °C (98.4 °F)] 36.9 °C (98.4 °F)  Pulse:  [62-67] 67  Resp:  [17-19] 17  BP: (146-159)/(73-81) 159/78  SpO2:  [93 %-100 %] 93 %    Physical Exam   Constitutional: He is oriented to person, place, and time. No distress.   HENT:   Head: Normocephalic and atraumatic.   Eyes: Pupils are equal, round, and reactive to light. EOM are normal. Right eye exhibits no discharge.   Neck: Normal range of motion. Neck supple.   Cardiovascular: Normal rate and regular rhythm.    Pulmonary/Chest: No respiratory distress.   Abdominal: Soft. He exhibits no distension.   Musculoskeletal: He exhibits edema.   Right 1st toe amputation   Neurological: He is alert and oriented to person, place, and time. No cranial nerve deficit. Coordination normal.   MMT 5/5   Skin: Skin is warm and dry. He is not diaphoretic. There is erythema.    Venous stasis both legs   Psychiatric: He has a normal mood and affect. His behavior is normal. Judgment and thought content normal.   Nursing note and vitals reviewed.      Fluids    Intake/Output Summary (Last 24 hours) at 02/22/19 1400  Last data filed at 02/22/19 1300   Gross per 24 hour   Intake             1570 ml   Output             1600 ml   Net              -30 ml       Laboratory  Recent Labs      02/20/19   0045   WBC  7.4   RBC  4.33*   HEMOGLOBIN  12.7*   HEMATOCRIT  39.1*   MCV  90.3   MCH  29.3   MCHC  32.5*   RDW  47.9   PLATELETCT  162*   MPV  10.3     Recent Labs      02/20/19   0045   SODIUM  134*   POTASSIUM  4.0   CHLORIDE  104   CO2  27   GLUCOSE  108*   BUN  24*   CREATININE  0.73   CALCIUM  9.2                   Imaging  DX-HIP-UNILATERAL-WITH PELVIS-1 VIEW RIGHT   Final Result      No radiographic evidence of acute traumatic injury right hip.      DX-CHEST-PORTABLE (1 VIEW)   Final Result      Hypoinflation without other evidence for acute cardiopulmonary disease.      MR-BRAIN-W/O   Final Result      1.  MRI of the brain without contrast within normal limits for age with mild white matter changes.   2.  Chronic appearing sinus disease   3.  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 This does not have an aggressive appearance.      US-EXTREMITY VENOUS LOWER BILAT   Final Result      CT-HAND WITH RIGHT   Final Result      1.  Edema in the dorsum of the forearm and hand without a discrete fluid collection to suggest abscess is identified.      US-CAROTID DOPPLER BILAT   Final Result      DX-LUMBAR SPINE-2 OR 3 VIEWS   Final Result         Stable compression deformity in the L1 vertebral body.      CT-CTA CHEST PULMONARY ARTERY W/ RECONS   Final Result         1. No CT evidence of pulmonary embolism.      2. Old right rib fractures.      3. Cholelithiasis. Enlarged spleen with granulomas.      4. Mild superior endplate compression deformity of  L1 could be acute. Correlate with point tenderness. No retropulsion. No malalignment.      CT-CSPINE WITHOUT PLUS RECONS   Final Result         1. No acute fracture from C1 through T3 is visualized.         CT-HEAD W/O   Final Result         1. No acute intracranial abnormality. No evidence of acute intracranial hemorrhage or mass lesion.               DX-THORACIC SPINE-WITH SWIMMERS VIEW   Final Result      No acute thoracic spine fracture or subluxation identified.      DX-CHEST-PORTABLE (1 VIEW)   Final Result      No acute cardiac or pulmonary abnormality is noted.           Assessment/Plan  * Orthostatic hypotension- (present on admission)   Assessment & Plan    Decreased Florinef to 0.2 mg  Midodrine adjusted parameters (Hold for SBP > 150)     Suicidal ideation   Assessment & Plan    Prozac, Risperdal  1:1 sitter, psychiatry to reassess needs    2/15 cont legal hold    2/18 awaiting inpatient psych placement, patient agreeable     Fall from ground level- (present on admission)   Assessment & Plan    PT/OT     Syncope- (present on admission)   Assessment & Plan    Autonomic dysfunction?       Debility- (present on admission)   Assessment & Plan    PT/OT     Acute respiratory failure with hypoxia (HCC)- (present on admission)   Assessment & Plan    Resolved     Lumbar compression fracture (HCC)- (present on admission)   Assessment & Plan    TLSO brace   pain control  Limit narcotics  NSAID as needed, monitor closely    Ambulating with CGA     Psychogenic nonepileptic seizure- (present on admission)   Assessment & Plan    no treatment indicated.     Chronic diastolic heart failure (HCC)- (present on admission)   Assessment & Plan    Off Lisinopril and Lasix     Venous stasis dermatitis of both lower extremities- (present on admission)   Assessment & Plan    Compression     Bilateral leg edema- (present on admission)   Assessment & Plan    compression     Vitamin B12 deficiency- (present on admission)    Assessment & Plan    oral B12     Candida infection of genital region   Assessment & Plan    - nystatin powder  - benadryl prn    2/17- ongoing erythema, maintain C/D    2/19-continue nystatin powder  Ongoing erythema, add abx ? Secondary cellulitis  F/u am labs    2/20 on clindamycin and nystatin powder  Improving    2/21  Improving erythema, to complete 7 days of clindamycin    2/22 improving       Paroxysmal A-fib (HCC)- (present on admission)   Assessment & Plan    Amiodarone, ASA  [id number] - 3     Essential hypertension- (present on admission)   Assessment & Plan    Off Lisinopril     Mixed hyperlipidemia- (present on admission)   Assessment & Plan    Pravastatin       Type 2 diabetes mellitus, without long-term current use of insulin, with peripheral neuropathy (HCC)- (present on admission)   Assessment & Plan    Diet controlled     Hyponatremia- (present on admission)   Assessment & Plan    Salt tabs, follow bmp          VTE prophylaxis: Lovenox

## 2019-02-22 NOTE — PROGRESS NOTES
Bedside report received. Assumed care of pt.  Pt able to make needs known.  Pt shows no s/s of distress.  Resting comfortably in bed.  Fall precautions in place, call light and belongings within reach. Orthostatics improved, but BP still low while standing.  Plan of care discussed, all questions and concerns answered.  Hourly rounding in place.

## 2019-02-22 NOTE — PSYCHIATRY
PSYCHIATRIC FOLLOW UP:    Reason for consult: si  Consulting phsyician: Slime      HPI:     Sebastián Castro is a 53 y.o. year old male with a PMH of CHF, a fib, DM, HTN,  a fib s/p cardioversion, orthostatic hypotentison with depressed mood, PTSD, and some functional seizures. He was also noted to have an L1 compression fracture.     His presentation is still somewhat perplexing; he now reports he is suicidal again because his girlfriend broke up with him. He does appear to be smiling and conversing at times with people. In addition, he has made some phone calls on the house phone and sitter noted he started talking before it was feasible that anyone could have picked up. Suspicious for malingering, but his presentation is bizarre in that he is doing things that are relatively strange and ineffective. Reports he continues to hear voices.     Psychiatric Examination: observed phenomenon:  Vitals: /73   Pulse 62   Temp 36.6 °C (97.9 °F) (Temporal)   Resp 17   Ht 1.829 m (6')   Wt 111.8 kg (246 lb 7.6 oz)   SpO2 100%   BMI 33.43 kg/m²  Body mass index is 33.43 kg/m².  Musculoskeletal no psychomotor agitatio or retardation.   Appearance: Grooming wnl   Thoughts Process: Logical and sequential, goal-directed   Thought Content: +ah; no evidence of delusions, no ideas of reference, no internal stimulation noted   Speech: Nl tone, rate, and volume. Not pressured. Understandable.   Mood:    depressed  Affect:    Full, congruent  SI/HI:   +si   Attention/Alertness:   Awake, alert. Attention wnl   Memory:    Grossly intact.   Orientation:    Grossly intact.   Cognition:Grossly intact.   Insight: decreased   Judgement:  Decreased   Neurological Testing:    Medical systems reviewed:   Eyes: no blurry vision no redness   Skin:no rash   CV:no cp no palpitations    Lungs:no sob   Neuro: weakness  GI:no n/v, no diarrhea  :no dysuria   Endocrine:hypotentsion   Psych:see hpi   Musculoskeletal:   All other systems  reviewed and are negative.       Soc history:    Gf broke up with him today.   He also reports he has nowehere to stay.     Lab results/tests:   Recent Results (from the past 48 hour(s))   CBC WITH DIFFERENTIAL    Collection Time: 02/20/19 12:45 AM   Result Value Ref Range    WBC 7.4 4.8 - 10.8 K/uL    RBC 4.33 (L) 4.70 - 6.10 M/uL    Hemoglobin 12.7 (L) 14.0 - 18.0 g/dL    Hematocrit 39.1 (L) 42.0 - 52.0 %    MCV 90.3 81.4 - 97.8 fL    MCH 29.3 27.0 - 33.0 pg    MCHC 32.5 (L) 33.7 - 35.3 g/dL    RDW 47.9 35.9 - 50.0 fL    Platelet Count 162 (L) 164 - 446 K/uL    MPV 10.3 9.0 - 12.9 fL    Neutrophils-Polys 59.60 44.00 - 72.00 %    Lymphocytes 25.70 22.00 - 41.00 %    Monocytes 9.90 0.00 - 13.40 %    Eosinophils 4.10 0.00 - 6.90 %    Basophils 0.40 0.00 - 1.80 %    Immature Granulocytes 0.30 0.00 - 0.90 %    Nucleated RBC 0.00 /100 WBC    Neutrophils (Absolute) 4.41 1.82 - 7.42 K/uL    Lymphs (Absolute) 1.90 1.00 - 4.80 K/uL    Monos (Absolute) 0.73 0.00 - 0.85 K/uL    Eos (Absolute) 0.30 0.00 - 0.51 K/uL    Baso (Absolute) 0.03 0.00 - 0.12 K/uL    Immature Granulocytes (abs) 0.02 0.00 - 0.11 K/uL    NRBC (Absolute) 0.00 K/uL   Basic Metabolic Panel    Collection Time: 02/20/19 12:45 AM   Result Value Ref Range    Sodium 134 (L) 135 - 145 mmol/L    Potassium 4.0 3.6 - 5.5 mmol/L    Chloride 104 96 - 112 mmol/L    Co2 27 20 - 33 mmol/L    Glucose 108 (H) 65 - 99 mg/dL    Bun 24 (H) 8 - 22 mg/dL    Creatinine 0.73 0.50 - 1.40 mg/dL    Calcium 9.2 8.5 - 10.5 mg/dL    Anion Gap 3.0 0.0 - 11.9   ESTIMATED GFR    Collection Time: 02/20/19 12:45 AM   Result Value Ref Range    GFR If African American >60 >60 mL/min/1.73 m 2    GFR If Non African American >60 >60 mL/min/1.73 m 2     DX-HIP-UNILATERAL-WITH PELVIS-1 VIEW RIGHT   Final Result      No radiographic evidence of acute traumatic injury right hip.      DX-CHEST-PORTABLE (1 VIEW)   Final Result      Hypoinflation without other evidence for acute cardiopulmonary  disease.      MR-BRAIN-W/O   Final Result      1.  MRI of the brain without contrast within normal limits for age with mild white matter changes.   2.  Chronic appearing sinus disease   3.  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 This does not have an aggressive appearance.      US-EXTREMITY VENOUS LOWER BILAT   Final Result      CT-HAND WITH RIGHT   Final Result      1.  Edema in the dorsum of the forearm and hand without a discrete fluid collection to suggest abscess is identified.      US-CAROTID DOPPLER BILAT   Final Result      DX-LUMBAR SPINE-2 OR 3 VIEWS   Final Result         Stable compression deformity in the L1 vertebral body.      CT-CTA CHEST PULMONARY ARTERY W/ RECONS   Final Result         1. No CT evidence of pulmonary embolism.      2. Old right rib fractures.      3. Cholelithiasis. Enlarged spleen with granulomas.      4. Mild superior endplate compression deformity of L1 could be acute. Correlate with point tenderness. No retropulsion. No malalignment.      CT-CSPINE WITHOUT PLUS RECONS   Final Result         1. No acute fracture from C1 through T3 is visualized.         CT-HEAD W/O   Final Result         1. No acute intracranial abnormality. No evidence of acute intracranial hemorrhage or mass lesion.               DX-THORACIC SPINE-WITH SWIMMERS VIEW   Final Result      No acute thoracic spine fracture or subluxation identified.      DX-CHEST-PORTABLE (1 VIEW)   Final Result      No acute cardiac or pulmonary abnormality is noted.               Assessment:  MDD, severe, without psychotic features           Plan:  legal hold: extended    I continue to be hesitant to drop hold with his frequent inconsistencies and how long he has been waiting for a bed at [facility]. As he is still hearing voices and now stating he is suicidal again, will continue to hold and will keep sitter.     Changing risperdal to 3mg po qhs.   Will follow.

## 2019-02-22 NOTE — CARE PLAN
Problem: Mobility  Goal: Risk for activity intolerance will decrease  Outcome: PROGRESSING AS EXPECTED  Pt to ambulate halls 3x per day.    Problem: Skin Integrity  Goal: Risk for impaired skin integrity will decrease  Outcome: PROGRESSING AS EXPECTED  Pt assisted with showers daily. Pt instructed on cleaning pannus and interdrys and nystatin.

## 2019-02-22 NOTE — CARE PLAN
Problem: Psychosocial Needs:  Goal: Level of anxiety will decrease  Outcome: PROGRESSING SLOWER THAN EXPECTED  Patients girlfriend broke up with him today. Allowed patient time to voice feelings and provided therapeutic communication.    Problem: Skin Integrity  Goal: Risk for impaired skin integrity will decrease  Outcome: PROGRESSING AS EXPECTED  Educated patient on the importance of keeping skin dry. Patient showered 2 days in a row. Encourage patient to complete self care.

## 2019-02-23 PROCEDURE — A9270 NON-COVERED ITEM OR SERVICE: HCPCS | Performed by: INTERNAL MEDICINE

## 2019-02-23 PROCEDURE — 700102 HCHG RX REV CODE 250 W/ 637 OVERRIDE(OP): Performed by: HOSPITALIST

## 2019-02-23 PROCEDURE — A9270 NON-COVERED ITEM OR SERVICE: HCPCS | Performed by: FAMILY MEDICINE

## 2019-02-23 PROCEDURE — 700102 HCHG RX REV CODE 250 W/ 637 OVERRIDE(OP): Performed by: FAMILY MEDICINE

## 2019-02-23 PROCEDURE — G0378 HOSPITAL OBSERVATION PER HR: HCPCS

## 2019-02-23 PROCEDURE — A9270 NON-COVERED ITEM OR SERVICE: HCPCS | Performed by: HOSPITALIST

## 2019-02-23 PROCEDURE — 99224 PR SUBSEQUENT OBSERVATION CARE,LEVEL I: CPT | Performed by: INTERNAL MEDICINE

## 2019-02-23 PROCEDURE — 700102 HCHG RX REV CODE 250 W/ 637 OVERRIDE(OP): Performed by: PSYCHIATRY & NEUROLOGY

## 2019-02-23 PROCEDURE — 700102 HCHG RX REV CODE 250 W/ 637 OVERRIDE(OP): Performed by: INTERNAL MEDICINE

## 2019-02-23 PROCEDURE — A9270 NON-COVERED ITEM OR SERVICE: HCPCS | Performed by: PSYCHIATRY & NEUROLOGY

## 2019-02-23 PROCEDURE — 700111 HCHG RX REV CODE 636 W/ 250 OVERRIDE (IP): Performed by: FAMILY MEDICINE

## 2019-02-23 RX ADMIN — OXYCODONE HYDROCHLORIDE 5 MG: 5 TABLET ORAL at 08:08

## 2019-02-23 RX ADMIN — RISPERIDONE 3 MG: 3 TABLET ORAL at 18:19

## 2019-02-23 RX ADMIN — CLINDAMYCIN HYDROCHLORIDE 300 MG: 150 CAPSULE ORAL at 00:44

## 2019-02-23 RX ADMIN — SODIUM CHLORIDE TAB 1 GM 1 G: 1 TAB at 08:08

## 2019-02-23 RX ADMIN — NYSTATIN: 100000 POWDER TOPICAL at 06:03

## 2019-02-23 RX ADMIN — AMIODARONE HYDROCHLORIDE 50 MG: 200 TABLET ORAL at 06:03

## 2019-02-23 RX ADMIN — SENNOSIDES AND DOCUSATE SODIUM 2 TABLET: 8.6; 5 TABLET ORAL at 06:02

## 2019-02-23 RX ADMIN — METOCLOPRAMIDE HYDROCHLORIDE 10 MG: 10 TABLET ORAL at 18:22

## 2019-02-23 RX ADMIN — PYRIDOSTIGMINE BROMIDE 90 MG: 60 TABLET ORAL at 06:03

## 2019-02-23 RX ADMIN — ENOXAPARIN SODIUM 40 MG: 100 INJECTION SUBCUTANEOUS at 06:02

## 2019-02-23 RX ADMIN — PYRIDOSTIGMINE BROMIDE 90 MG: 60 TABLET ORAL at 18:20

## 2019-02-23 RX ADMIN — MIDODRINE HYDROCHLORIDE 10 MG: 5 TABLET ORAL at 18:21

## 2019-02-23 RX ADMIN — METOCLOPRAMIDE HYDROCHLORIDE 10 MG: 10 TABLET ORAL at 08:08

## 2019-02-23 RX ADMIN — NYSTATIN: 100000 POWDER TOPICAL at 18:22

## 2019-02-23 RX ADMIN — Medication 400 MG: at 06:03

## 2019-02-23 RX ADMIN — CYANOCOBALAMIN TAB 500 MCG 1000 MCG: 500 TAB at 06:02

## 2019-02-23 RX ADMIN — MULTIPLE VITAMINS W/ MINERALS TAB 1 TABLET: TAB at 06:02

## 2019-02-23 RX ADMIN — METOCLOPRAMIDE HYDROCHLORIDE 10 MG: 10 TABLET ORAL at 13:01

## 2019-02-23 RX ADMIN — OXYCODONE HYDROCHLORIDE AND ACETAMINOPHEN 500 MG: 500 TABLET ORAL at 06:04

## 2019-02-23 RX ADMIN — FLUOXETINE HYDROCHLORIDE 60 MG: 20 CAPSULE ORAL at 06:02

## 2019-02-23 RX ADMIN — PYRIDOSTIGMINE BROMIDE 90 MG: 60 TABLET ORAL at 13:01

## 2019-02-23 RX ADMIN — SODIUM CHLORIDE TAB 1 GM 1 G: 1 TAB at 18:21

## 2019-02-23 RX ADMIN — CLINDAMYCIN HYDROCHLORIDE 300 MG: 150 CAPSULE ORAL at 06:03

## 2019-02-23 RX ADMIN — CLINDAMYCIN HYDROCHLORIDE 300 MG: 150 CAPSULE ORAL at 13:39

## 2019-02-23 RX ADMIN — FLUDROCORTISONE ACETATE 0.2 MG: 0.1 TABLET ORAL at 06:04

## 2019-02-23 RX ADMIN — MIDODRINE HYDROCHLORIDE 10 MG: 5 TABLET ORAL at 13:01

## 2019-02-23 RX ADMIN — PRAVASTATIN SODIUM 20 MG: 20 TABLET ORAL at 18:22

## 2019-02-23 RX ADMIN — ASPIRIN 81 MG 81 MG: 81 TABLET ORAL at 06:03

## 2019-02-23 RX ADMIN — SODIUM CHLORIDE TAB 1 GM 1 G: 1 TAB at 13:01

## 2019-02-23 RX ADMIN — CLINDAMYCIN HYDROCHLORIDE 300 MG: 150 CAPSULE ORAL at 20:50

## 2019-02-23 ASSESSMENT — ENCOUNTER SYMPTOMS
BACK PAIN: 1
DIAPHORESIS: 0
WEAKNESS: 0
SHORTNESS OF BREATH: 0
HEADACHES: 0
CHILLS: 0
FEVER: 0

## 2019-02-23 NOTE — PROGRESS NOTES
Assumed patient care at 1900. Received Bedside report. Patient lying on side in bed. Awakened easily. States he has had a good day. Alert and oriented x 4. Discussed POC. No s/s of distress. Patient denies any further questions or concerns. Call light in reach, fall precautions in place. Continue hourly rounding. /42   Pulse 68   Temp 36.6 °C (97.9 °F) (Temporal)   Resp 16   Ht 1.829 m (6')   Wt 118.9 kg (262 lb 2 oz)   SpO2 93%   BMI 35.55 kg/m²

## 2019-02-23 NOTE — PROGRESS NOTES
[facility] Medicine Daily Progress Note    Date of Service  2/23/2019    Chief Complaint  53 y.o. male admitted 12/28/2018 with Syncope    Hospital Course  Admitted with Syncope, noted to have L1 compression fracture, work up negative except for orthostatic hypotension.  He then started complaining of suicidal thoughts.    Interval Problem Update  Pleasant, cooperative    Consultants/Specialty  Psychiatry  Neurosurgery signed off    Code Status  Full    Disposition  Legal hold    Awaiting inpatient psychiatry placement    Review of Systems  Review of Systems   Constitutional: Negative for chills, diaphoresis and fever.   Respiratory: Negative for shortness of breath.    Cardiovascular: Positive for leg swelling.   Genitourinary: Negative for hematuria and urgency.   Musculoskeletal: Positive for back pain.   Neurological: Negative for weakness and headaches.   Psychiatric/Behavioral: Positive for suicidal ideas.        Physical Exam  Temp:  [36.2 °C (97.2 °F)-36.6 °C (97.9 °F)] 36.6 °C (97.8 °F)  Pulse:  [60-71] 68  Resp:  [16-18] 16  BP: (111-167)/(42-94) 167/94  SpO2:  [93 %-95 %] 95 %    Physical Exam   Constitutional: He is oriented to person, place, and time. No distress.   HENT:   Head: Normocephalic and atraumatic.   Eyes: Pupils are equal, round, and reactive to light. EOM are normal.   Neck: Normal range of motion. Neck supple.   Cardiovascular: Normal rate and regular rhythm.    Pulmonary/Chest: No respiratory distress.   Abdominal: Soft. He exhibits no distension. There is no tenderness.   Musculoskeletal: He exhibits edema.   Right 1st toe amputation   Neurological: He is alert and oriented to person, place, and time. No cranial nerve deficit. He exhibits normal muscle tone.   MMT 5/5   Skin: Skin is warm. He is not diaphoretic. There is erythema.   Venous stasis both legs   Psychiatric: He has a normal mood and affect. His behavior is normal. Judgment and thought content normal.   Nursing note and vitals  reviewed.      Fluids    Intake/Output Summary (Last 24 hours) at 02/23/19 1233  Last data filed at 02/23/19 0900   Gross per 24 hour   Intake              550 ml   Output              300 ml   Net              250 ml       Laboratory                        Imaging  DX-HIP-UNILATERAL-WITH PELVIS-1 VIEW RIGHT   Final Result      No radiographic evidence of acute traumatic injury right hip.      DX-CHEST-PORTABLE (1 VIEW)   Final Result      Hypoinflation without other evidence for acute cardiopulmonary disease.      MR-BRAIN-W/O   Final Result      1.  MRI of the brain without contrast within normal limits for age with mild white matter changes.   2.  Chronic appearing sinus disease   3.  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 This does not have an aggressive appearance.      US-EXTREMITY VENOUS LOWER BILAT   Final Result      CT-HAND WITH RIGHT   Final Result      1.  Edema in the dorsum of the forearm and hand without a discrete fluid collection to suggest abscess is identified.      US-CAROTID DOPPLER BILAT   Final Result      DX-LUMBAR SPINE-2 OR 3 VIEWS   Final Result         Stable compression deformity in the L1 vertebral body.      CT-CTA CHEST PULMONARY ARTERY W/ RECONS   Final Result         1. No CT evidence of pulmonary embolism.      2. Old right rib fractures.      3. Cholelithiasis. Enlarged spleen with granulomas.      4. Mild superior endplate compression deformity of L1 could be acute. Correlate with point tenderness. No retropulsion. No malalignment.      CT-CSPINE WITHOUT PLUS RECONS   Final Result         1. No acute fracture from C1 through T3 is visualized.         CT-HEAD W/O   Final Result         1. No acute intracranial abnormality. No evidence of acute intracranial hemorrhage or mass lesion.               DX-THORACIC SPINE-WITH SWIMMERS VIEW   Final Result      No acute thoracic spine fracture or subluxation identified.       DX-CHEST-PORTABLE (1 VIEW)   Final Result      No acute cardiac or pulmonary abnormality is noted.           Assessment/Plan  * Orthostatic hypotension- (present on admission)   Assessment & Plan    Decreased Florinef to 0.2 mg  Midodrine adjusted parameters (Hold for SBP > 150)     Suicidal ideation   Assessment & Plan    Prozac, Risperdal  1:1 sitter, psychiatry to reassess needs    2/15 cont legal hold    2/18 awaiting inpatient psych placement, patient agreeable    2/23  Ongoing SI, needs inpatient psych  -now off 1:1 sitter     Fall from ground level- (present on admission)   Assessment & Plan    PT/OT     Syncope- (present on admission)   Assessment & Plan    Autonomic dysfunction?       Debility- (present on admission)   Assessment & Plan    PT/OT     Acute respiratory failure with hypoxia (HCC)- (present on admission)   Assessment & Plan    Resolved     Lumbar compression fracture (HCC)- (present on admission)   Assessment & Plan    TLSO brace   pain control  Limit narcotics  NSAID as needed, monitor closely    Ambulating with CGA    2/23 pain controlled     Psychogenic nonepileptic seizure- (present on admission)   Assessment & Plan    no treatment indicated.     Chronic diastolic heart failure (HCC)- (present on admission)   Assessment & Plan    Off Lisinopril and Lasix     Venous stasis dermatitis of both lower extremities- (present on admission)   Assessment & Plan    Compression     Bilateral leg edema- (present on admission)   Assessment & Plan    compression     Vitamin B12 deficiency- (present on admission)   Assessment & Plan    oral B12     Candida infection of genital region   Assessment & Plan    - nystatin powder  - benadryl prn    2/17- ongoing erythema, maintain C/D    2/19-continue nystatin powder  Ongoing erythema, add abx ? Secondary cellulitis  F/u am labs    2/20 on clindamycin and nystatin powder  Improving    2/21  Improving erythema, to complete 7 days of clindamycin    2/22  improving       Paroxysmal A-fib (HCC)- (present on admission)   Assessment & Plan    Amiodarone, ASA  [id number] - 3     Essential hypertension- (present on admission)   Assessment & Plan    Off Lisinopril     Mixed hyperlipidemia- (present on admission)   Assessment & Plan    Pravastatin       Type 2 diabetes mellitus, without long-term current use of insulin, with peripheral neuropathy (HCC)- (present on admission)   Assessment & Plan    Diet controlled     Hyponatremia- (present on admission)   Assessment & Plan    Salt tabs, follow bmp          VTE prophylaxis: Lovenox

## 2019-02-23 NOTE — CARE PLAN
Problem: Pain Management  Goal: Pain level will decrease to patient's comfort goal  Outcome: PROGRESSING AS EXPECTED      Problem: Venous Thromboembolism (VTW)/Deep Vein Thrombosis (DVT) Prevention:  Goal: Patient will participate in Venous Thrombosis (VTE)/Deep Vein Thrombosis (DVT)Prevention Measures   02/22/19 0732   OTHER   Pharmacologic Prophylaxis Used LMWH: Enoxaparin(Lovenox)   Mechanical/VTE Prophylaxis   Mechanical Prophylaxis  RADHA Hose (Graduated Compression Stockings)   RADHA Hose (Graduated Compression Stockings) On   Patient ambulated several times today     Problem: Skin Integrity  Goal: Risk for impaired skin integrity will decrease  Outcome: PROGRESSING AS EXPECTED  Pannus area seems to be imrpoving with oral ABX, interdry and nystatin. Patient has been showering daily.

## 2019-02-23 NOTE — PROGRESS NOTES
Report received. Assumed care at 0700, assessment complete. Pt is A&O x 4. 8/10 pain at this time in lower back. Patient instructed on the plan of care for the day. Bed in lowest position. Bed alarm on. Call light within reach. Patient provided RN and CNA extension.

## 2019-02-23 NOTE — CARE PLAN
Problem: Infection  Goal: Will remain free from infection  Outcome: PROGRESSING AS EXPECTED      Problem: Mobility  Goal: Risk for activity intolerance will decrease  Outcome: PROGRESSING AS EXPECTED  Pt to ambulate in halls 3x per day.    Problem: Skin Integrity  Goal: Risk for impaired skin integrity will decrease  Outcome: PROGRESSING AS EXPECTED  Pt educated on how to maintain skin integrity in pannus including use of interdry, nystatin, and good hygiene.

## 2019-02-24 PROCEDURE — 700102 HCHG RX REV CODE 250 W/ 637 OVERRIDE(OP): Performed by: INTERNAL MEDICINE

## 2019-02-24 PROCEDURE — A9270 NON-COVERED ITEM OR SERVICE: HCPCS | Performed by: PSYCHIATRY & NEUROLOGY

## 2019-02-24 PROCEDURE — 700102 HCHG RX REV CODE 250 W/ 637 OVERRIDE(OP): Performed by: FAMILY MEDICINE

## 2019-02-24 PROCEDURE — A9270 NON-COVERED ITEM OR SERVICE: HCPCS | Performed by: FAMILY MEDICINE

## 2019-02-24 PROCEDURE — G0378 HOSPITAL OBSERVATION PER HR: HCPCS

## 2019-02-24 PROCEDURE — 700111 HCHG RX REV CODE 636 W/ 250 OVERRIDE (IP): Performed by: FAMILY MEDICINE

## 2019-02-24 PROCEDURE — 700102 HCHG RX REV CODE 250 W/ 637 OVERRIDE(OP): Performed by: HOSPITALIST

## 2019-02-24 PROCEDURE — A9270 NON-COVERED ITEM OR SERVICE: HCPCS | Performed by: HOSPITALIST

## 2019-02-24 PROCEDURE — A9270 NON-COVERED ITEM OR SERVICE: HCPCS | Performed by: INTERNAL MEDICINE

## 2019-02-24 PROCEDURE — 99226 PR SUBSEQUENT OBSERVATION CARE,LEVEL III: CPT | Performed by: INTERNAL MEDICINE

## 2019-02-24 PROCEDURE — 700102 HCHG RX REV CODE 250 W/ 637 OVERRIDE(OP): Performed by: PSYCHIATRY & NEUROLOGY

## 2019-02-24 RX ADMIN — PYRIDOSTIGMINE BROMIDE 90 MG: 60 TABLET ORAL at 04:31

## 2019-02-24 RX ADMIN — METOCLOPRAMIDE HYDROCHLORIDE 10 MG: 10 TABLET ORAL at 08:41

## 2019-02-24 RX ADMIN — CLINDAMYCIN HYDROCHLORIDE 300 MG: 150 CAPSULE ORAL at 17:09

## 2019-02-24 RX ADMIN — NYSTATIN: 100000 POWDER TOPICAL at 04:31

## 2019-02-24 RX ADMIN — SODIUM CHLORIDE TAB 1 GM 1 G: 1 TAB at 08:41

## 2019-02-24 RX ADMIN — METOCLOPRAMIDE HYDROCHLORIDE 10 MG: 10 TABLET ORAL at 11:57

## 2019-02-24 RX ADMIN — CLINDAMYCIN HYDROCHLORIDE 300 MG: 150 CAPSULE ORAL at 10:49

## 2019-02-24 RX ADMIN — AMIODARONE HYDROCHLORIDE 50 MG: 200 TABLET ORAL at 04:32

## 2019-02-24 RX ADMIN — Medication 400 MG: at 04:31

## 2019-02-24 RX ADMIN — CYANOCOBALAMIN TAB 500 MCG 1000 MCG: 500 TAB at 04:32

## 2019-02-24 RX ADMIN — MIDODRINE HYDROCHLORIDE 10 MG: 5 TABLET ORAL at 17:10

## 2019-02-24 RX ADMIN — ASPIRIN 81 MG 81 MG: 81 TABLET ORAL at 04:31

## 2019-02-24 RX ADMIN — METOCLOPRAMIDE HYDROCHLORIDE 10 MG: 10 TABLET ORAL at 17:10

## 2019-02-24 RX ADMIN — OXYCODONE HYDROCHLORIDE AND ACETAMINOPHEN 500 MG: 500 TABLET ORAL at 04:31

## 2019-02-24 RX ADMIN — FLUDROCORTISONE ACETATE 0.2 MG: 0.1 TABLET ORAL at 04:33

## 2019-02-24 RX ADMIN — SODIUM CHLORIDE TAB 1 GM 1 G: 1 TAB at 17:10

## 2019-02-24 RX ADMIN — PRAVASTATIN SODIUM 20 MG: 20 TABLET ORAL at 17:10

## 2019-02-24 RX ADMIN — RISPERIDONE 3 MG: 3 TABLET ORAL at 17:10

## 2019-02-24 RX ADMIN — MIDODRINE HYDROCHLORIDE 10 MG: 5 TABLET ORAL at 11:56

## 2019-02-24 RX ADMIN — FLUOXETINE HYDROCHLORIDE 60 MG: 20 CAPSULE ORAL at 04:31

## 2019-02-24 RX ADMIN — ENOXAPARIN SODIUM 40 MG: 100 INJECTION SUBCUTANEOUS at 04:31

## 2019-02-24 RX ADMIN — CLINDAMYCIN HYDROCHLORIDE 300 MG: 150 CAPSULE ORAL at 04:32

## 2019-02-24 RX ADMIN — SODIUM CHLORIDE TAB 1 GM 1 G: 1 TAB at 11:59

## 2019-02-24 RX ADMIN — PYRIDOSTIGMINE BROMIDE 90 MG: 60 TABLET ORAL at 11:57

## 2019-02-24 RX ADMIN — MIDODRINE HYDROCHLORIDE 10 MG: 5 TABLET ORAL at 04:31

## 2019-02-24 RX ADMIN — NYSTATIN: 100000 POWDER TOPICAL at 17:14

## 2019-02-24 RX ADMIN — PYRIDOSTIGMINE BROMIDE 90 MG: 60 TABLET ORAL at 17:10

## 2019-02-24 RX ADMIN — MULTIPLE VITAMINS W/ MINERALS TAB 1 TABLET: TAB at 04:31

## 2019-02-24 ASSESSMENT — ENCOUNTER SYMPTOMS
SHORTNESS OF BREATH: 0
FEVER: 0
BACK PAIN: 1
MEMORY LOSS: 0
WEAKNESS: 0
HEADACHES: 0
CHILLS: 0
DIZZINESS: 0
MYALGIAS: 0
NAUSEA: 0
DEPRESSION: 1

## 2019-02-24 NOTE — PROGRESS NOTES
He was able to ambulate freeing in the hallway with observation from the staff members. He sat at a table by the nurse's station during meals and conversed with other patient's. Psych consult placed by Dr Sanchez.

## 2019-02-24 NOTE — PSYCHIATRY
"PSYCHIATRIC FOLLOW UP:    Reason for consult: si  Consulting phsyician: Slime      HPI:     Sebastián Castro is a 53 y.o. year old male with a PMH of CHF, a fib, DM, HTN,  a fib s/p cardioversion, orthostatic hypotentison with depressed mood, PTSD, and some functional seizures. He was also noted to have an L1 compression fracture.     Mr. Castro report he is still hearing voices regularly; he doesn't appear internally stimulated, sitter confirms he never appears to be internally stimulated. He states \"oh, well, it's only at night. It only happens at night time\". Have suspected he is attempting to stay inpatient due to homelessness, and he indicates when asked that he wouldn't kill himself in a group home. Discuss with him that he can't go to a group home on a legal hold or if they had any thoughts he might kill himself there. He reports that he will be fine, and that he won't kill himself.     Psychiatric Examination: observed phenomenon:  Vitals: /76   Pulse 66   Temp 36.7 °C (98.1 °F) (Temporal)   Resp 17   Ht 1.829 m (6')   Wt 118.9 kg (262 lb 2 oz)   SpO2 94%   BMI 35.55 kg/m²  Body mass index is 35.55 kg/m².  Musculoskeletal no psychomotor agitatio or retardation.   Appearance: Grooming wnl   Thoughts Process: Logical and sequential, goal-directed   Thought Content: no evidence of delusions, no ideas of reference, no internal stimulation noted; reporting auditory hallucinations although we have concerns about his reporting   Speech: Nl tone, rate, and volume. Not pressured. Understandable.   Mood:    depressed  Affect:    Full, congruent  SI/HI:   Denies   Attention/Alertness:   Awake, alert. Attention wnl   Memory:    Grossly intact.   Orientation:    Grossly intact.   Cognition:Grossly intact.   Insight: decreased   Judgement:  Decreased   Neurological Testing:    Medical systems reviewed:   Eyes: no blurry vision no redness   Skin:no rash   CV:no cp no palpitations    Lungs:no sob   Neuro: " weakness  GI:no n/v, no diarrhea  :no dysuria   Endocrine:hypotentsion   Psych:see hpi   Musculoskeletal:   All other systems reviewed and are negative.       Soc history:    Gf broke up with him   He also reports he has nowehere to stay.     Lab results/tests:   No results found for this or any previous visit (from the past 48 hour(s)).  DX-HIP-UNILATERAL-WITH PELVIS-1 VIEW RIGHT   Final Result      No radiographic evidence of acute traumatic injury right hip.      DX-CHEST-PORTABLE (1 VIEW)   Final Result      Hypoinflation without other evidence for acute cardiopulmonary disease.      MR-BRAIN-W/O   Final Result      1.  MRI of the brain without contrast within normal limits for age with mild white matter changes.   2.  Chronic appearing sinus disease   3.  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 This does not have an aggressive appearance.      US-EXTREMITY VENOUS LOWER BILAT   Final Result      CT-HAND WITH RIGHT   Final Result      1.  Edema in the dorsum of the forearm and hand without a discrete fluid collection to suggest abscess is identified.      US-CAROTID DOPPLER BILAT   Final Result      DX-LUMBAR SPINE-2 OR 3 VIEWS   Final Result         Stable compression deformity in the L1 vertebral body.      CT-CTA CHEST PULMONARY ARTERY W/ RECONS   Final Result         1. No CT evidence of pulmonary embolism.      2. Old right rib fractures.      3. Cholelithiasis. Enlarged spleen with granulomas.      4. Mild superior endplate compression deformity of L1 could be acute. Correlate with point tenderness. No retropulsion. No malalignment.      CT-CSPINE WITHOUT PLUS RECONS   Final Result         1. No acute fracture from C1 through T3 is visualized.         CT-HEAD W/O   Final Result         1. No acute intracranial abnormality. No evidence of acute intracranial hemorrhage or mass lesion.               DX-THORACIC SPINE-WITH SWIMMERS VIEW   Final Result      No  acute thoracic spine fracture or subluxation identified.      DX-CHEST-PORTABLE (1 VIEW)   Final Result      No acute cardiac or pulmonary abnormality is noted.               Assessment:  MDD, severe, without psychotic features           Plan:  legal hold: extended    I continue to be hesitant to drop hold with his frequent inconsistencies and how long he has been waiting for a bed at [facility].     Will continue hold at this time since he is waiting for a bed, but d/w SW and will start pursing group home treatment for him at this time.

## 2019-02-24 NOTE — PROGRESS NOTES
[facility] Medicine Daily Progress Note    Date of Service  2/24/2019    Chief Complaint  53 y.o. male admitted 12/28/2018 with Syncope    Hospital Course  Admitted with Syncope, noted to have L1 compression fracture, work up negative except for orthostatic hypotension.  He then started complaining of suicidal thoughts.    Interval Problem Update  Cooperative, interacting with staff  However patient states feeling lonely and has recurrent suicidal thoughts  Psych f/u    Consultants/Specialty  Psychiatry  Neurosurgery signed off    Code Status  Full    Disposition  Legal hold    Awaiting inpatient psychiatry placement    Review of Systems  Review of Systems   Constitutional: Negative for chills and fever.   Respiratory: Negative for shortness of breath.    Cardiovascular: Positive for leg swelling.   Gastrointestinal: Negative for nausea.   Genitourinary: Negative for dysuria and hematuria.   Musculoskeletal: Positive for back pain. Negative for myalgias.   Neurological: Negative for dizziness, weakness and headaches.   Psychiatric/Behavioral: Positive for depression and suicidal ideas. Negative for memory loss.        Physical Exam  Temp:  [35.8 °C (96.5 °F)-36.8 °C (98.3 °F)] 36.8 °C (98.3 °F)  Pulse:  [62-76] 66  Resp:  [16-18] 18  BP: (108-158)/(65-82) 149/82  SpO2:  [90 %-97 %] 92 %    Physical Exam   Constitutional: He is oriented to person, place, and time. No distress.   HENT:   Head: Normocephalic.   Mouth/Throat: No oropharyngeal exudate.   Eyes: Pupils are equal, round, and reactive to light. EOM are normal.   Neck: Normal range of motion.   Cardiovascular: Normal rate.    Pulmonary/Chest: Effort normal. No respiratory distress.   Abdominal: Soft. He exhibits no distension.   Musculoskeletal: He exhibits edema. He exhibits no tenderness.   Right 1st toe amputation   Neurological: He is alert and oriented to person, place, and time. No cranial nerve deficit. Coordination normal.   MMT 5/5   Skin: Skin is  warm. He is not diaphoretic. There is erythema.   Venous stasis both legs   Psychiatric: He has a normal mood and affect. His behavior is normal. Judgment and thought content normal.   Nursing note and vitals reviewed.      Fluids    Intake/Output Summary (Last 24 hours) at 02/24/19 1238  Last data filed at 02/24/19 0900   Gross per 24 hour   Intake              480 ml   Output              900 ml   Net             -420 ml       Laboratory                        Imaging  DX-HIP-UNILATERAL-WITH PELVIS-1 VIEW RIGHT   Final Result      No radiographic evidence of acute traumatic injury right hip.      DX-CHEST-PORTABLE (1 VIEW)   Final Result      Hypoinflation without other evidence for acute cardiopulmonary disease.      MR-BRAIN-W/O   Final Result      1.  MRI of the brain without contrast within normal limits for age with mild white matter changes.   2.  Chronic appearing sinus disease   3.  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 This does not have an aggressive appearance.      US-EXTREMITY VENOUS LOWER BILAT   Final Result      CT-HAND WITH RIGHT   Final Result      1.  Edema in the dorsum of the forearm and hand without a discrete fluid collection to suggest abscess is identified.      US-CAROTID DOPPLER BILAT   Final Result      DX-LUMBAR SPINE-2 OR 3 VIEWS   Final Result         Stable compression deformity in the L1 vertebral body.      CT-CTA CHEST PULMONARY ARTERY W/ RECONS   Final Result         1. No CT evidence of pulmonary embolism.      2. Old right rib fractures.      3. Cholelithiasis. Enlarged spleen with granulomas.      4. Mild superior endplate compression deformity of L1 could be acute. Correlate with point tenderness. No retropulsion. No malalignment.      CT-CSPINE WITHOUT PLUS RECONS   Final Result         1. No acute fracture from C1 through T3 is visualized.         CT-HEAD W/O   Final Result         1. No acute intracranial abnormality. No  evidence of acute intracranial hemorrhage or mass lesion.               DX-THORACIC SPINE-WITH SWIMMERS VIEW   Final Result      No acute thoracic spine fracture or subluxation identified.      DX-CHEST-PORTABLE (1 VIEW)   Final Result      No acute cardiac or pulmonary abnormality is noted.           Assessment/Plan  * Orthostatic hypotension- (present on admission)   Assessment & Plan    Decreased Florinef to 0.2 mg  Midodrine adjusted parameters (Hold for SBP > 150)     Suicidal ideation   Assessment & Plan    Prozac, Risperdal  1:1 sitter, psychiatry to reassess needs    2/15 cont legal hold    2/18 awaiting inpatient psych placement, patient agreeable    2/23  Ongoing SI, needs inpatient psych  -now off 1:1 sitter    2/24  Recurrent thoughts Of SI, requesting psych reeval  - on legal hold  - psych has been contacted by staff, awaiting rec  - d/w staff to maintain close watch, and possible need to resume 1: 1 monitoring  - pt reports feeling lonely       Fall from ground level- (present on admission)   Assessment & Plan    PT/OT     Syncope- (present on admission)   Assessment & Plan    Autonomic dysfunction?       Debility- (present on admission)   Assessment & Plan    PT/OT     Acute respiratory failure with hypoxia (HCC)- (present on admission)   Assessment & Plan    Resolved     Lumbar compression fracture (HCC)- (present on admission)   Assessment & Plan    TLSO brace   pain control  Limit narcotics  NSAID as needed, monitor closely    Ambulating with CGA    2/23 pain controlled     Psychogenic nonepileptic seizure- (present on admission)   Assessment & Plan    no treatment indicated.     Chronic diastolic heart failure (HCC)- (present on admission)   Assessment & Plan    Off Lisinopril and Lasix     Venous stasis dermatitis of both lower extremities- (present on admission)   Assessment & Plan    Compression     Bilateral leg edema- (present on admission)   Assessment & Plan    compression     Vitamin B12  deficiency- (present on admission)   Assessment & Plan    oral B12     Candida infection of genital region   Assessment & Plan    - nystatin powder  - benadryl prn    2/17- ongoing erythema, maintain C/D    2/19-continue nystatin powder  Ongoing erythema, add abx ? Secondary cellulitis  F/u am labs    2/20 on clindamycin and nystatin powder  Improving    2/21  Improving erythema, to complete 7 days of clindamycin    2/22 improving       Paroxysmal A-fib (HCC)- (present on admission)   Assessment & Plan    Amiodarone, ASA  [id number] - 3     Essential hypertension- (present on admission)   Assessment & Plan    Off Lisinopril     Mixed hyperlipidemia- (present on admission)   Assessment & Plan    Pravastatin       Type 2 diabetes mellitus, without long-term current use of insulin, with peripheral neuropathy (HCC)- (present on admission)   Assessment & Plan    Diet controlled     Hyponatremia- (present on admission)   Assessment & Plan    Salt tabs, follow bmp          VTE prophylaxis: Lovenox

## 2019-02-24 NOTE — PROGRESS NOTES
Received bedside report from Night RN. Patient stated having suicidal thoughts to Night RN. During am assessment pt again stated having thoughts of endig his life. He thought it may be from being alone.

## 2019-02-25 PROCEDURE — 700102 HCHG RX REV CODE 250 W/ 637 OVERRIDE(OP): Performed by: HOSPITALIST

## 2019-02-25 PROCEDURE — 700111 HCHG RX REV CODE 636 W/ 250 OVERRIDE (IP): Performed by: FAMILY MEDICINE

## 2019-02-25 PROCEDURE — 700102 HCHG RX REV CODE 250 W/ 637 OVERRIDE(OP): Performed by: INTERNAL MEDICINE

## 2019-02-25 PROCEDURE — A9270 NON-COVERED ITEM OR SERVICE: HCPCS | Performed by: FAMILY MEDICINE

## 2019-02-25 PROCEDURE — A9270 NON-COVERED ITEM OR SERVICE: HCPCS | Performed by: HOSPITALIST

## 2019-02-25 PROCEDURE — 700102 HCHG RX REV CODE 250 W/ 637 OVERRIDE(OP): Performed by: FAMILY MEDICINE

## 2019-02-25 PROCEDURE — 99224 PR SUBSEQUENT OBSERVATION CARE,LEVEL I: CPT | Performed by: INTERNAL MEDICINE

## 2019-02-25 PROCEDURE — G0378 HOSPITAL OBSERVATION PER HR: HCPCS

## 2019-02-25 PROCEDURE — A9270 NON-COVERED ITEM OR SERVICE: HCPCS | Performed by: PSYCHIATRY & NEUROLOGY

## 2019-02-25 PROCEDURE — A9270 NON-COVERED ITEM OR SERVICE: HCPCS | Performed by: INTERNAL MEDICINE

## 2019-02-25 PROCEDURE — 700102 HCHG RX REV CODE 250 W/ 637 OVERRIDE(OP): Performed by: PSYCHIATRY & NEUROLOGY

## 2019-02-25 RX ADMIN — FLUDROCORTISONE ACETATE 0.2 MG: 0.1 TABLET ORAL at 05:21

## 2019-02-25 RX ADMIN — SODIUM CHLORIDE TAB 1 GM 1 G: 1 TAB at 08:54

## 2019-02-25 RX ADMIN — PYRIDOSTIGMINE BROMIDE 90 MG: 60 TABLET ORAL at 14:32

## 2019-02-25 RX ADMIN — CYANOCOBALAMIN TAB 500 MCG 1000 MCG: 500 TAB at 05:16

## 2019-02-25 RX ADMIN — SENNOSIDES AND DOCUSATE SODIUM 2 TABLET: 8.6; 5 TABLET ORAL at 18:00

## 2019-02-25 RX ADMIN — ASPIRIN 81 MG 81 MG: 81 TABLET ORAL at 05:17

## 2019-02-25 RX ADMIN — METOCLOPRAMIDE HYDROCHLORIDE 10 MG: 10 TABLET ORAL at 05:15

## 2019-02-25 RX ADMIN — Medication 400 MG: at 14:32

## 2019-02-25 RX ADMIN — CLINDAMYCIN HYDROCHLORIDE 300 MG: 150 CAPSULE ORAL at 10:31

## 2019-02-25 RX ADMIN — PRAVASTATIN SODIUM 20 MG: 20 TABLET ORAL at 18:00

## 2019-02-25 RX ADMIN — METOCLOPRAMIDE HYDROCHLORIDE 10 MG: 10 TABLET ORAL at 18:01

## 2019-02-25 RX ADMIN — PYRIDOSTIGMINE BROMIDE 90 MG: 60 TABLET ORAL at 18:01

## 2019-02-25 RX ADMIN — ENOXAPARIN SODIUM 40 MG: 100 INJECTION SUBCUTANEOUS at 05:13

## 2019-02-25 RX ADMIN — RISPERIDONE 3 MG: 3 TABLET ORAL at 18:00

## 2019-02-25 RX ADMIN — METOCLOPRAMIDE HYDROCHLORIDE 10 MG: 10 TABLET ORAL at 12:37

## 2019-02-25 RX ADMIN — AMIODARONE HYDROCHLORIDE 50 MG: 200 TABLET ORAL at 05:17

## 2019-02-25 RX ADMIN — SODIUM CHLORIDE TAB 1 GM 1 G: 1 TAB at 12:37

## 2019-02-25 RX ADMIN — CLINDAMYCIN HYDROCHLORIDE 300 MG: 150 CAPSULE ORAL at 01:54

## 2019-02-25 RX ADMIN — NYSTATIN: 100000 POWDER TOPICAL at 18:01

## 2019-02-25 RX ADMIN — MIDODRINE HYDROCHLORIDE 10 MG: 5 TABLET ORAL at 18:01

## 2019-02-25 RX ADMIN — SODIUM CHLORIDE TAB 1 GM 1 G: 1 TAB at 18:00

## 2019-02-25 RX ADMIN — MULTIPLE VITAMINS W/ MINERALS TAB 1 TABLET: TAB at 05:15

## 2019-02-25 RX ADMIN — CLINDAMYCIN HYDROCHLORIDE 300 MG: 150 CAPSULE ORAL at 14:33

## 2019-02-25 RX ADMIN — MIDODRINE HYDROCHLORIDE 10 MG: 5 TABLET ORAL at 12:37

## 2019-02-25 RX ADMIN — CLINDAMYCIN HYDROCHLORIDE 300 MG: 150 CAPSULE ORAL at 21:35

## 2019-02-25 RX ADMIN — PYRIDOSTIGMINE BROMIDE 90 MG: 60 TABLET ORAL at 05:21

## 2019-02-25 RX ADMIN — FLUOXETINE HYDROCHLORIDE 60 MG: 20 CAPSULE ORAL at 05:15

## 2019-02-25 RX ADMIN — OXYCODONE HYDROCHLORIDE AND ACETAMINOPHEN 500 MG: 500 TABLET ORAL at 05:17

## 2019-02-25 RX ADMIN — MIDODRINE HYDROCHLORIDE 10 MG: 5 TABLET ORAL at 05:13

## 2019-02-25 RX ADMIN — NYSTATIN: 100000 POWDER TOPICAL at 06:00

## 2019-02-25 ASSESSMENT — ENCOUNTER SYMPTOMS
COUGH: 0
ABDOMINAL PAIN: 0
NAUSEA: 0
HEADACHES: 0
HEARTBURN: 0
WEAKNESS: 0
PALPITATIONS: 0
DEPRESSION: 1
SHORTNESS OF BREATH: 0
FOCAL WEAKNESS: 0
BACK PAIN: 1

## 2019-02-25 NOTE — CARE PLAN
Problem: Safety  Goal: Will remain free from injury  Pt educated to use the call light when needing assistance, pt confirmed understanding.     Problem: Discharge Barriers/Planning  Goal: Patient's continuum of care needs will be met  Legal hold till 2/28

## 2019-02-25 NOTE — PROGRESS NOTES
.Bedside shift report complete w/ Zachary RN. Plan of care and prior shift events reviewed with patient and RN. Lines/drains/airways assessed as appropriate. Environment assessed for pt safety, walkways clear of obstacles and well lit, personal items/call light in reach, bed locked/low. Pt alert and responds appropriately, educated about hourly rounding, assessed for needs, see MAR/OBS/ADL doc flow sheets for interventions. Care board updated with changes to plan of care, current staff names, and date as appropriate.    Legal hold is in place and extended through 2/28.

## 2019-02-25 NOTE — PROGRESS NOTES
[facility] Medicine Daily Progress Note    Date of Service  2/25/2019    Chief Complaint  53 y.o. male admitted 12/28/2018 with Syncope    Hospital Course  Admitted with Syncope, noted to have L1 compression fracture, work up negative except for orthostatic hypotension.  He then started complaining of suicidal thoughts.    Interval Problem Update  No new events, interactive  Psych f/u    Consultants/Specialty  Psychiatry  Neurosurgery signed off    Code Status  Full    Disposition  Legal hold  Cont close monitoring  Off 1:1 per psych  Awaiting inpatient psychiatry placement    Review of Systems  Review of Systems   Constitutional: Negative for malaise/fatigue.   Respiratory: Negative for cough and shortness of breath.    Cardiovascular: Positive for leg swelling. Negative for palpitations.   Gastrointestinal: Negative for abdominal pain, heartburn and nausea.   Genitourinary: Negative for hematuria.   Musculoskeletal: Positive for back pain.   Neurological: Negative for focal weakness, weakness and headaches.   Psychiatric/Behavioral: Positive for depression and suicidal ideas.        Physical Exam  Temp:  [36.2 °C (97.2 °F)-36.7 °C (98.1 °F)] 36.2 °C (97.2 °F)  Pulse:  [62-71] 70  Resp:  [17-19] 17  BP: (105-144)/(55-76) 144/76  SpO2:  [93 %-95 %] 93 %    Physical Exam   Constitutional: He is oriented to person, place, and time. No distress.   HENT:   Head: Normocephalic.   Eyes: Pupils are equal, round, and reactive to light. EOM are normal.   Neck: Normal range of motion.   Cardiovascular: Normal rate.    Pulmonary/Chest: Effort normal. No respiratory distress.   Abdominal: Soft. He exhibits no distension. There is no tenderness.   Musculoskeletal: He exhibits edema.   Right 1st toe amputation   Neurological: He is alert and oriented to person, place, and time. No cranial nerve deficit. He exhibits normal muscle tone.   MMT 5/5   Skin: Skin is warm. There is erythema.   Venous stasis both legs   Psychiatric: He has  a normal mood and affect. His behavior is normal. Judgment and thought content normal.   Nursing note and vitals reviewed.      Fluids    Intake/Output Summary (Last 24 hours) at 02/25/19 1155  Last data filed at 02/25/19 0900   Gross per 24 hour   Intake              960 ml   Output              675 ml   Net              285 ml       Laboratory                        Imaging  DX-HIP-UNILATERAL-WITH PELVIS-1 VIEW RIGHT   Final Result      No radiographic evidence of acute traumatic injury right hip.      DX-CHEST-PORTABLE (1 VIEW)   Final Result      Hypoinflation without other evidence for acute cardiopulmonary disease.      MR-BRAIN-W/O   Final Result      1.  MRI of the brain without contrast within normal limits for age with mild white matter changes.   2.  Chronic appearing sinus disease   3.  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 This does not have an aggressive appearance.      US-EXTREMITY VENOUS LOWER BILAT   Final Result      CT-HAND WITH RIGHT   Final Result      1.  Edema in the dorsum of the forearm and hand without a discrete fluid collection to suggest abscess is identified.      US-CAROTID DOPPLER BILAT   Final Result      DX-LUMBAR SPINE-2 OR 3 VIEWS   Final Result         Stable compression deformity in the L1 vertebral body.      CT-CTA CHEST PULMONARY ARTERY W/ RECONS   Final Result         1. No CT evidence of pulmonary embolism.      2. Old right rib fractures.      3. Cholelithiasis. Enlarged spleen with granulomas.      4. Mild superior endplate compression deformity of L1 could be acute. Correlate with point tenderness. No retropulsion. No malalignment.      CT-CSPINE WITHOUT PLUS RECONS   Final Result         1. No acute fracture from C1 through T3 is visualized.         CT-HEAD W/O   Final Result         1. No acute intracranial abnormality. No evidence of acute intracranial hemorrhage or mass lesion.               DX-THORACIC SPINE-WITH  SWIMMERS VIEW   Final Result      No acute thoracic spine fracture or subluxation identified.      DX-CHEST-PORTABLE (1 VIEW)   Final Result      No acute cardiac or pulmonary abnormality is noted.           Assessment/Plan  * Orthostatic hypotension- (present on admission)   Assessment & Plan    Decreased Florinef to 0.2 mg  Midodrine adjusted parameters (Hold for SBP > 150)     Suicidal ideation   Assessment & Plan    Prozac, Risperdal  1:1 sitter, psychiatry to reassess needs    2/15 cont legal hold    2/18 awaiting inpatient psych placement, patient agreeable    2/23  Ongoing SI, needs inpatient psych  -now off 1:1 sitter    2/24  Recurrent thoughts Of SI, requesting psych reeval  - on legal hold  - psych has been contacted by staff, awaiting rec  - d/w staff to maintain close watch, and possible need to resume 1: 1 monitoring  - pt reports feeling lonely    2/25 states he feels better today, interacting with staff  Psychiatry advised for f/u  Awaiting inpatient psych facility placement       Fall from ground level- (present on admission)   Assessment & Plan    PT/OT     Syncope- (present on admission)   Assessment & Plan    Autonomic dysfunction?       Debility- (present on admission)   Assessment & Plan    PT/OT     Acute respiratory failure with hypoxia (HCC)- (present on admission)   Assessment & Plan    Resolved     Lumbar compression fracture (HCC)- (present on admission)   Assessment & Plan    TLSO brace   pain control  Limit narcotics  NSAID as needed, monitor closely    Ambulating with CGA    2/23 pain controlled     Psychogenic nonepileptic seizure- (present on admission)   Assessment & Plan    no treatment indicated.     Chronic diastolic heart failure (HCC)- (present on admission)   Assessment & Plan    Off Lisinopril and Lasix     Venous stasis dermatitis of both lower extremities- (present on admission)   Assessment & Plan    Compression     Bilateral leg edema- (present on admission)   Assessment  & Plan    compression     Vitamin B12 deficiency- (present on admission)   Assessment & Plan    oral B12     Candida infection of genital region   Assessment & Plan    - nystatin powder  - benadryl prn    2/17- ongoing erythema, maintain C/D    2/19-continue nystatin powder  Ongoing erythema, add abx ? Secondary cellulitis  F/u am labs    2/20 on clindamycin and nystatin powder  Improving    2/21  Improving erythema, to complete 7 days of clindamycin    2/22 improving       Paroxysmal A-fib (HCC)- (present on admission)   Assessment & Plan    Amiodarone, ASA  [id number] - 3     Essential hypertension- (present on admission)   Assessment & Plan    Off Lisinopril     Mixed hyperlipidemia- (present on admission)   Assessment & Plan    Pravastatin       Type 2 diabetes mellitus, without long-term current use of insulin, with peripheral neuropathy (HCC)- (present on admission)   Assessment & Plan    Diet controlled     Hyponatremia- (present on admission)   Assessment & Plan    Salt tabs, follow bmp          VTE prophylaxis: Lovenox

## 2019-02-26 PROBLEM — J96.01 ACUTE RESPIRATORY FAILURE WITH HYPOXIA (HCC): Status: RESOLVED | Noted: 2019-01-07 | Resolved: 2019-02-26

## 2019-02-26 PROCEDURE — A9270 NON-COVERED ITEM OR SERVICE: HCPCS | Performed by: INTERNAL MEDICINE

## 2019-02-26 PROCEDURE — 700102 HCHG RX REV CODE 250 W/ 637 OVERRIDE(OP): Performed by: FAMILY MEDICINE

## 2019-02-26 PROCEDURE — 700111 HCHG RX REV CODE 636 W/ 250 OVERRIDE (IP): Performed by: FAMILY MEDICINE

## 2019-02-26 PROCEDURE — 700102 HCHG RX REV CODE 250 W/ 637 OVERRIDE(OP): Performed by: HOSPITALIST

## 2019-02-26 PROCEDURE — G0378 HOSPITAL OBSERVATION PER HR: HCPCS

## 2019-02-26 PROCEDURE — 99225 PR SUBSEQUENT OBSERVATION CARE,LEVEL II: CPT | Performed by: HOSPITALIST

## 2019-02-26 PROCEDURE — A9270 NON-COVERED ITEM OR SERVICE: HCPCS | Performed by: HOSPITALIST

## 2019-02-26 PROCEDURE — A9270 NON-COVERED ITEM OR SERVICE: HCPCS | Performed by: PSYCHIATRY & NEUROLOGY

## 2019-02-26 PROCEDURE — 700102 HCHG RX REV CODE 250 W/ 637 OVERRIDE(OP): Performed by: INTERNAL MEDICINE

## 2019-02-26 PROCEDURE — 700102 HCHG RX REV CODE 250 W/ 637 OVERRIDE(OP): Performed by: PSYCHIATRY & NEUROLOGY

## 2019-02-26 PROCEDURE — A9270 NON-COVERED ITEM OR SERVICE: HCPCS | Performed by: FAMILY MEDICINE

## 2019-02-26 RX ADMIN — ASPIRIN 81 MG 81 MG: 81 TABLET ORAL at 06:45

## 2019-02-26 RX ADMIN — NYSTATIN: 100000 POWDER TOPICAL at 18:05

## 2019-02-26 RX ADMIN — METOCLOPRAMIDE HYDROCHLORIDE 10 MG: 10 TABLET ORAL at 08:29

## 2019-02-26 RX ADMIN — SODIUM CHLORIDE TAB 1 GM 1 G: 1 TAB at 18:04

## 2019-02-26 RX ADMIN — ENOXAPARIN SODIUM 40 MG: 100 INJECTION SUBCUTANEOUS at 06:45

## 2019-02-26 RX ADMIN — SODIUM CHLORIDE TAB 1 GM 1 G: 1 TAB at 12:33

## 2019-02-26 RX ADMIN — METOCLOPRAMIDE HYDROCHLORIDE 10 MG: 10 TABLET ORAL at 18:04

## 2019-02-26 RX ADMIN — Medication 400 MG: at 06:45

## 2019-02-26 RX ADMIN — AMIODARONE HYDROCHLORIDE 50 MG: 200 TABLET ORAL at 06:44

## 2019-02-26 RX ADMIN — METOCLOPRAMIDE HYDROCHLORIDE 10 MG: 10 TABLET ORAL at 12:33

## 2019-02-26 RX ADMIN — FLUDROCORTISONE ACETATE 0.2 MG: 0.1 TABLET ORAL at 08:29

## 2019-02-26 RX ADMIN — RISPERIDONE 3 MG: 3 TABLET ORAL at 18:04

## 2019-02-26 RX ADMIN — FLUOXETINE HYDROCHLORIDE 60 MG: 20 CAPSULE ORAL at 06:45

## 2019-02-26 RX ADMIN — MIDODRINE HYDROCHLORIDE 10 MG: 5 TABLET ORAL at 12:32

## 2019-02-26 RX ADMIN — CLINDAMYCIN HYDROCHLORIDE 300 MG: 150 CAPSULE ORAL at 08:28

## 2019-02-26 RX ADMIN — PYRIDOSTIGMINE BROMIDE 90 MG: 60 TABLET ORAL at 12:32

## 2019-02-26 RX ADMIN — OXYCODONE HYDROCHLORIDE AND ACETAMINOPHEN 500 MG: 500 TABLET ORAL at 06:45

## 2019-02-26 RX ADMIN — SODIUM CHLORIDE TAB 1 GM 1 G: 1 TAB at 08:29

## 2019-02-26 RX ADMIN — MIDODRINE HYDROCHLORIDE 10 MG: 5 TABLET ORAL at 06:45

## 2019-02-26 RX ADMIN — ACETAMINOPHEN 650 MG: 325 TABLET, FILM COATED ORAL at 08:39

## 2019-02-26 RX ADMIN — PYRIDOSTIGMINE BROMIDE 90 MG: 60 TABLET ORAL at 18:05

## 2019-02-26 RX ADMIN — MIDODRINE HYDROCHLORIDE 10 MG: 5 TABLET ORAL at 18:04

## 2019-02-26 RX ADMIN — SENNOSIDES AND DOCUSATE SODIUM 2 TABLET: 8.6; 5 TABLET ORAL at 06:45

## 2019-02-26 RX ADMIN — CYANOCOBALAMIN TAB 500 MCG 1000 MCG: 500 TAB at 06:45

## 2019-02-26 RX ADMIN — PYRIDOSTIGMINE BROMIDE 90 MG: 60 TABLET ORAL at 08:28

## 2019-02-26 RX ADMIN — PRAVASTATIN SODIUM 20 MG: 20 TABLET ORAL at 18:04

## 2019-02-26 RX ADMIN — NYSTATIN: 100000 POWDER TOPICAL at 06:45

## 2019-02-26 RX ADMIN — CLINDAMYCIN HYDROCHLORIDE 300 MG: 150 CAPSULE ORAL at 03:21

## 2019-02-26 RX ADMIN — MULTIPLE VITAMINS W/ MINERALS TAB 1 TABLET: TAB at 06:45

## 2019-02-26 ASSESSMENT — ENCOUNTER SYMPTOMS
SHORTNESS OF BREATH: 0
HEARTBURN: 0
ABDOMINAL PAIN: 0
WEAKNESS: 0
PALPITATIONS: 0
COUGH: 0
CHILLS: 0
FEVER: 0
NAUSEA: 0
INSOMNIA: 1
BACK PAIN: 1
HALLUCINATIONS: 0
HEADACHES: 0
DEPRESSION: 1
FOCAL WEAKNESS: 0

## 2019-02-26 NOTE — PROGRESS NOTES
Assumed patient care at 0700. Received report from night shift. Legal hold continued until 02/28. Assessment completed. A&Ox 4. C/o 7/10 back pain, medicated per MAR. Low fall risk, precautions in place; treaded socks on pt, personal possessions and call light placed within reach. Pt ambulating halls throughout shift. POC discussed with pt, communication board updated. Patient denies any additional needs at this time.

## 2019-02-26 NOTE — PROGRESS NOTES
"Hospital Medicine Daily Progress Note    Date of Service  2/26/2019    Chief Complaint  53 y.o. male admitted 12/28/2018 with Syncope    Hospital Course  Admitted with Syncope, noted to have L1 compression fracture, work up negative except for orthostatic hypotension.  He then started complaining of suicidal thoughts.    Interval Problem Update  More interactive and appears to be in good spirits.  Endorses continued SI appears to be more passive as in \"I just wish I would not wake up\".    Consultants/Specialty  Psychiatry  Neurosurgery-signed off    Code Status  Full    Disposition  Legal hold  Awaiting inpatient psychiatry placement versus discharge to [facility]    Review of Systems  Review of Systems   Constitutional: Negative for chills, fever and malaise/fatigue.   Respiratory: Negative for cough and shortness of breath.    Cardiovascular: Positive for leg swelling. Negative for chest pain and palpitations.   Gastrointestinal: Negative for abdominal pain, heartburn and nausea.   Genitourinary: Negative for hematuria.   Musculoskeletal: Positive for back pain.   Neurological: Negative for focal weakness, weakness and headaches.   Psychiatric/Behavioral: Positive for depression and suicidal ideas. Negative for hallucinations. The patient has insomnia.         Physical Exam  Temp:  [36.2 °C (97.2 °F)-37.2 °C (99 °F)] 37.2 °C (99 °F)  Pulse:  [62-70] 62  Resp:  [17-19] 19  BP: (100-144)/(41-76) 136/61  SpO2:  [93 %-97 %] 93 %    Physical Exam   Constitutional: He is oriented to person, place, and time. He appears well-developed. No distress.   HENT:   Head: Normocephalic.   Mouth/Throat: Oropharynx is clear and moist.   Eyes: EOM are normal.   Neck: Normal range of motion.   Cardiovascular: Normal rate.    Pulmonary/Chest: Effort normal. No respiratory distress.   Abdominal: Soft. He exhibits no distension. There is no tenderness.   Musculoskeletal: He exhibits edema. He exhibits no tenderness.   Right 1st toe " amputation   Neurological: He is alert and oriented to person, place, and time. No cranial nerve deficit.   MMT 5/5   Skin: Skin is warm and dry. He is not diaphoretic.   Venous stasis both legs   Psychiatric: His speech is normal and behavior is normal. Judgment normal. He exhibits a depressed mood. He expresses no suicidal plans.   Vitals reviewed.      Fluids    Intake/Output Summary (Last 24 hours) at 02/26/19 0649  Last data filed at 02/25/19 2200   Gross per 24 hour   Intake              600 ml   Output              675 ml   Net              -75 ml       Laboratory                        Imaging  DX-HIP-UNILATERAL-WITH PELVIS-1 VIEW RIGHT   Final Result      No radiographic evidence of acute traumatic injury right hip.      DX-CHEST-PORTABLE (1 VIEW)   Final Result      Hypoinflation without other evidence for acute cardiopulmonary disease.      MR-BRAIN-W/O   Final Result      1.  MRI of the brain without contrast within normal limits for age with mild white matter changes.   2.  Chronic appearing sinus disease   3.  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 This does not have an aggressive appearance.      US-EXTREMITY VENOUS LOWER BILAT   Final Result      CT-HAND WITH RIGHT   Final Result      1.  Edema in the dorsum of the forearm and hand without a discrete fluid collection to suggest abscess is identified.      US-CAROTID DOPPLER BILAT   Final Result      DX-LUMBAR SPINE-2 OR 3 VIEWS   Final Result         Stable compression deformity in the L1 vertebral body.      CT-CTA CHEST PULMONARY ARTERY W/ RECONS   Final Result         1. No CT evidence of pulmonary embolism.      2. Old right rib fractures.      3. Cholelithiasis. Enlarged spleen with granulomas.      4. Mild superior endplate compression deformity of L1 could be acute. Correlate with point tenderness. No retropulsion. No malalignment.      CT-CSPINE WITHOUT PLUS RECONS   Final Result         1.  No acute fracture from C1 through T3 is visualized.         CT-HEAD W/O   Final Result         1. No acute intracranial abnormality. No evidence of acute intracranial hemorrhage or mass lesion.               DX-THORACIC SPINE-WITH SWIMMERS VIEW   Final Result      No acute thoracic spine fracture or subluxation identified.      DX-CHEST-PORTABLE (1 VIEW)   Final Result      No acute cardiac or pulmonary abnormality is noted.           Assessment/Plan  * Orthostatic hypotension- (present on admission)   Assessment & Plan    Currently asymptomatic and ambulating around the nurses station.  States that it started to recur more frequently after his fall, prior to presentation  -Tolerating decrease in Florinef to 0.2 mg  -Midodrine adjusted parameters (Hold for SBP > 150)     Suicidal ideation   Assessment & Plan    Patient continuing to improve but then requested reevaluation by psychiatry on 2/24  -Continue Prozac, Risperdal  -No longer in need of a 1:1 sitter  -Psychiatry following, greatly appreciate  -Continuing on a legal hold, court date for 2/27       Syncope- (present on admission)   Assessment & Plan    Autonomic dysfunction?       Fall from ground level- (present on admission)   Assessment & Plan    PT/OT     Lumbar compression fracture (HCC)- (present on admission)   Assessment & Plan    -TLSO brace   -pain control  -NSAID as needed, monitor closely  -Continue with ambulation, PT/OT     Psychogenic nonepileptic seizure- (present on admission)   Assessment & Plan    no treatment indicated.     Chronic diastolic heart failure (HCC)- (present on admission)   Assessment & Plan    Euvolemic and without symptoms of heart failure.    -Unable to tolerate Lasix or lisinopril given his orthostatic hypotension     Venous stasis dermatitis of both lower extremities- (present on admission)   Assessment & Plan    Compression     Bilateral leg edema- (present on admission)   Assessment & Plan    Chronic.    -Compression  stockings     Vitamin B12 deficiency- (present on admission)   Assessment & Plan    oral B12     Candida infection of genital region   Assessment & Plan    Bleed 7 days of clindamycin for cellulitis  - nystatin powder  - benadryl prn     Paroxysmal A-fib (HCC)- (present on admission)   Assessment & Plan    -Amiodarone, ASA  [id number] - 3  -Not a candidate for anticoagulation given his frequent falls     Essential hypertension- (present on admission)   Assessment & Plan    No longer hypertensive.  Off Lisinopril     Mixed hyperlipidemia- (present on admission)   Assessment & Plan    Pravastatin       Type 2 diabetes mellitus, without long-term current use of insulin, with peripheral neuropathy (HCC)- (present on admission)   Assessment & Plan    Diet controlled     Hyponatremia- (present on admission)   Assessment & Plan    Salt tabs, follow bmp          VTE prophylaxis: Lovenox

## 2019-02-26 NOTE — DISCHARGE PLANNING
Legal Hold    Referral: Legal Hold Court     Intervention: Pt presented for legal hold meeting with .  advised pt will meet with court MD's via telemedicine monitor to contest the legal hold.      Plan: Pt will present to telemedicine mental health to meet with court physicians 2/27. Will call bedside RN once time has been determined      .

## 2019-02-26 NOTE — CARE PLAN
Problem: Safety  Goal: Will remain free from injury  Outcome: PROGRESSING AS EXPECTED  Pt A&Ox4, call bell in reach, door open, denies any needs at this time, pt sleeping.     Problem: Bowel/Gastric:  Goal: Normal bowel function is maintained or improved  Outcome: PROGRESSING AS EXPECTED   02/25/19 0902 02/25/19 2154   OTHER   Last BM --  02/25/19   Number of Times Stooled 1 --

## 2019-02-26 NOTE — CARE PLAN
Problem: Bowel/Gastric:  Goal: Will not experience complications related to bowel motility    Intervention: Assess baseline bowel pattern  BM on 02/26. Stool softeners administered this am. Patient denies symptoms of constipation.       Problem: Skin Integrity  Goal: Risk for impaired skin integrity will decrease    Intervention: Implement precautions to protect skin integrity in collaboration with the interdisciplinary team  Nystatin and interdry in use to pannus area. Antifungal therapy for 7 days.

## 2019-02-27 ENCOUNTER — HOME HEALTH ADMISSION (OUTPATIENT)
Dept: HOME HEALTH SERVICES | Facility: HOME HEALTHCARE | Age: 54
End: 2019-02-27
Payer: MEDICAID

## 2019-02-27 VITALS
BODY MASS INDEX: 35.5 KG/M2 | SYSTOLIC BLOOD PRESSURE: 135 MMHG | HEART RATE: 64 BPM | OXYGEN SATURATION: 95 % | HEIGHT: 72 IN | TEMPERATURE: 97.5 F | WEIGHT: 262.13 LBS | RESPIRATION RATE: 16 BRPM | DIASTOLIC BLOOD PRESSURE: 77 MMHG

## 2019-02-27 PROBLEM — R45.851 SUICIDAL IDEATION: Status: RESOLVED | Noted: 2019-01-23 | Resolved: 2019-02-27

## 2019-02-27 PROBLEM — B37.49 CANDIDA INFECTION OF GENITAL REGION: Status: RESOLVED | Noted: 2019-02-14 | Resolved: 2019-02-27

## 2019-02-27 PROBLEM — R55 SYNCOPE: Status: RESOLVED | Noted: 2018-07-19 | Resolved: 2019-02-27

## 2019-02-27 PROBLEM — I95.1 ORTHOSTATIC HYPOTENSION: Status: RESOLVED | Noted: 2018-11-27 | Resolved: 2019-02-27

## 2019-02-27 PROBLEM — E87.1 HYPONATREMIA: Status: RESOLVED | Noted: 2017-11-11 | Resolved: 2019-02-27

## 2019-02-27 LAB
ANION GAP SERPL CALC-SCNC: 4 MMOL/L (ref 0–11.9)
BUN SERPL-MCNC: 32 MG/DL (ref 8–22)
CALCIUM SERPL-MCNC: 8.9 MG/DL (ref 8.5–10.5)
CHLORIDE SERPL-SCNC: 105 MMOL/L (ref 96–112)
CO2 SERPL-SCNC: 28 MMOL/L (ref 20–33)
CREAT SERPL-MCNC: 0.79 MG/DL (ref 0.5–1.4)
GLUCOSE SERPL-MCNC: 97 MG/DL (ref 65–99)
POTASSIUM SERPL-SCNC: 3.8 MMOL/L (ref 3.6–5.5)
SODIUM SERPL-SCNC: 137 MMOL/L (ref 135–145)

## 2019-02-27 PROCEDURE — 700102 HCHG RX REV CODE 250 W/ 637 OVERRIDE(OP): Performed by: HOSPITALIST

## 2019-02-27 PROCEDURE — A9270 NON-COVERED ITEM OR SERVICE: HCPCS | Performed by: FAMILY MEDICINE

## 2019-02-27 PROCEDURE — A9270 NON-COVERED ITEM OR SERVICE: HCPCS | Performed by: HOSPITALIST

## 2019-02-27 PROCEDURE — 36415 COLL VENOUS BLD VENIPUNCTURE: CPT

## 2019-02-27 PROCEDURE — 306311 ANTI-EMBOLISM STOCKINGS XXLRG LNG: Performed by: HOSPITALIST

## 2019-02-27 PROCEDURE — 80048 BASIC METABOLIC PNL TOTAL CA: CPT

## 2019-02-27 PROCEDURE — G0378 HOSPITAL OBSERVATION PER HR: HCPCS

## 2019-02-27 PROCEDURE — 700111 HCHG RX REV CODE 636 W/ 250 OVERRIDE (IP): Performed by: FAMILY MEDICINE

## 2019-02-27 PROCEDURE — 99217 PR OBSERVATION CARE DISCHARGE: CPT | Performed by: HOSPITALIST

## 2019-02-27 PROCEDURE — 700102 HCHG RX REV CODE 250 W/ 637 OVERRIDE(OP): Performed by: FAMILY MEDICINE

## 2019-02-27 PROCEDURE — 700102 HCHG RX REV CODE 250 W/ 637 OVERRIDE(OP): Performed by: PSYCHIATRY & NEUROLOGY

## 2019-02-27 PROCEDURE — A9270 NON-COVERED ITEM OR SERVICE: HCPCS | Performed by: PSYCHIATRY & NEUROLOGY

## 2019-02-27 RX ORDER — METOCLOPRAMIDE 10 MG/1
10 TABLET ORAL
Qty: 42 TAB | Refills: 0 | Status: SHIPPED | OUTPATIENT
Start: 2019-02-27 | End: 2019-03-13

## 2019-02-27 RX ORDER — FLUDROCORTISONE ACETATE 0.1 MG/1
0.2 TABLET ORAL EVERY MORNING
Qty: 30 TAB | Refills: 0 | Status: SHIPPED | OUTPATIENT
Start: 2019-02-28 | End: 2019-02-27

## 2019-02-27 RX ORDER — MIDODRINE HYDROCHLORIDE 10 MG/1
10 TABLET ORAL 3 TIMES DAILY
Qty: 60 TAB | Refills: 0 | Status: SHIPPED | OUTPATIENT
Start: 2019-02-27 | End: 2020-09-06

## 2019-02-27 RX ORDER — FUROSEMIDE 20 MG/1
10 TABLET ORAL
Qty: 30 TAB | Refills: 0 | Status: SHIPPED | OUTPATIENT
Start: 2019-02-27 | End: 2019-02-27

## 2019-02-27 RX ORDER — FLUOXETINE HYDROCHLORIDE 20 MG/1
60 CAPSULE ORAL DAILY
Qty: 90 CAP | Refills: 0 | Status: SHIPPED | OUTPATIENT
Start: 2019-02-28 | End: 2020-09-06

## 2019-02-27 RX ORDER — RISPERIDONE 3 MG/1
3 TABLET ORAL EVERY EVENING
Qty: 60 TAB | Refills: 0 | Status: SHIPPED | OUTPATIENT
Start: 2019-02-27 | End: 2020-09-06

## 2019-02-27 RX ORDER — PRAVASTATIN SODIUM 20 MG
20 TABLET ORAL DAILY
Qty: 90 TAB | Refills: 3 | Status: SHIPPED | OUTPATIENT
Start: 2019-02-27 | End: 2020-09-06

## 2019-02-27 RX ORDER — PYRIDOSTIGMINE BROMIDE 60 MG/1
120 TABLET ORAL 3 TIMES DAILY
Qty: 90 TAB | Refills: 3 | Status: ON HOLD | OUTPATIENT
Start: 2019-02-27 | End: 2019-10-26

## 2019-02-27 RX ORDER — FUROSEMIDE 20 MG/1
10 TABLET ORAL
Qty: 30 TAB | Refills: 0 | Status: SHIPPED | OUTPATIENT
Start: 2019-02-27 | End: 2020-09-06

## 2019-02-27 RX ORDER — ASPIRIN 81 MG/1
81 TABLET, CHEWABLE ORAL DAILY
Qty: 100 TAB | Refills: 0 | Status: SHIPPED | OUTPATIENT
Start: 2019-02-28 | End: 2019-02-27

## 2019-02-27 RX ORDER — FUROSEMIDE 20 MG/1
10 TABLET ORAL PRN
Qty: 60 TAB | Refills: 0 | Status: SHIPPED | OUTPATIENT
Start: 2019-02-27 | End: 2019-02-27

## 2019-02-27 RX ORDER — RISPERIDONE 3 MG/1
3 TABLET ORAL EVERY EVENING
Qty: 60 TAB | Refills: 0 | Status: SHIPPED | OUTPATIENT
Start: 2019-02-27 | End: 2019-02-27

## 2019-02-27 RX ORDER — METOCLOPRAMIDE 10 MG/1
10 TABLET ORAL
Qty: 42 TAB | Refills: 0 | Status: SHIPPED | OUTPATIENT
Start: 2019-02-27 | End: 2019-02-27

## 2019-02-27 RX ORDER — FLUOXETINE HYDROCHLORIDE 20 MG/1
60 CAPSULE ORAL DAILY
Qty: 90 CAP | Refills: 0 | Status: SHIPPED | OUTPATIENT
Start: 2019-02-28 | End: 2019-02-27

## 2019-02-27 RX ORDER — FLUDROCORTISONE ACETATE 0.1 MG/1
0.2 TABLET ORAL EVERY MORNING
Qty: 30 TAB | Refills: 0 | Status: SHIPPED | OUTPATIENT
Start: 2019-02-28 | End: 2020-09-06

## 2019-02-27 RX ORDER — SODIUM CHLORIDE 1 G/1
1 TABLET ORAL
Qty: 90 TAB | Refills: 0 | Status: SHIPPED | OUTPATIENT
Start: 2019-02-27 | End: 2020-09-06

## 2019-02-27 RX ORDER — MIDODRINE HYDROCHLORIDE 10 MG/1
10 TABLET ORAL 3 TIMES DAILY
Qty: 60 TAB | Refills: 0 | Status: SHIPPED | OUTPATIENT
Start: 2019-02-27 | End: 2019-02-27

## 2019-02-27 RX ORDER — AMIODARONE HYDROCHLORIDE 100 MG/1
50 TABLET ORAL DAILY
Qty: 30 TAB | Refills: 0 | Status: ON HOLD | OUTPATIENT
Start: 2019-02-27 | End: 2019-10-26

## 2019-02-27 RX ORDER — ASPIRIN 81 MG/1
81 TABLET, CHEWABLE ORAL DAILY
Qty: 100 TAB | Refills: 0 | Status: SHIPPED | OUTPATIENT
Start: 2019-02-28 | End: 2020-09-06

## 2019-02-27 RX ADMIN — METOCLOPRAMIDE HYDROCHLORIDE 10 MG: 10 TABLET ORAL at 11:32

## 2019-02-27 RX ADMIN — ENOXAPARIN SODIUM 40 MG: 100 INJECTION SUBCUTANEOUS at 05:55

## 2019-02-27 RX ADMIN — PYRIDOSTIGMINE BROMIDE 90 MG: 60 TABLET ORAL at 11:31

## 2019-02-27 RX ADMIN — MULTIPLE VITAMINS W/ MINERALS TAB 1 TABLET: TAB at 05:56

## 2019-02-27 RX ADMIN — OXYCODONE HYDROCHLORIDE AND ACETAMINOPHEN 500 MG: 500 TABLET ORAL at 05:57

## 2019-02-27 RX ADMIN — AMIODARONE HYDROCHLORIDE 50 MG: 200 TABLET ORAL at 05:57

## 2019-02-27 RX ADMIN — CYANOCOBALAMIN TAB 500 MCG 1000 MCG: 500 TAB at 05:57

## 2019-02-27 RX ADMIN — NYSTATIN: 100000 POWDER TOPICAL at 05:56

## 2019-02-27 RX ADMIN — FLUDROCORTISONE ACETATE 0.2 MG: 0.1 TABLET ORAL at 05:57

## 2019-02-27 RX ADMIN — SODIUM CHLORIDE TAB 1 GM 1 G: 1 TAB at 07:54

## 2019-02-27 RX ADMIN — PYRIDOSTIGMINE BROMIDE 90 MG: 60 TABLET ORAL at 05:56

## 2019-02-27 RX ADMIN — ASPIRIN 81 MG 81 MG: 81 TABLET ORAL at 05:57

## 2019-02-27 RX ADMIN — METOCLOPRAMIDE HYDROCHLORIDE 10 MG: 10 TABLET ORAL at 05:57

## 2019-02-27 RX ADMIN — SENNOSIDES AND DOCUSATE SODIUM 2 TABLET: 8.6; 5 TABLET ORAL at 05:56

## 2019-02-27 RX ADMIN — FLUOXETINE HYDROCHLORIDE 60 MG: 20 CAPSULE ORAL at 05:57

## 2019-02-27 RX ADMIN — ACETAMINOPHEN 650 MG: 325 TABLET, FILM COATED ORAL at 07:58

## 2019-02-27 RX ADMIN — MIDODRINE HYDROCHLORIDE 10 MG: 5 TABLET ORAL at 11:34

## 2019-02-27 RX ADMIN — Medication 400 MG: at 05:56

## 2019-02-27 RX ADMIN — MIDODRINE HYDROCHLORIDE 10 MG: 5 TABLET ORAL at 05:56

## 2019-02-27 RX ADMIN — SODIUM CHLORIDE TAB 1 GM 1 G: 1 TAB at 11:31

## 2019-02-27 NOTE — PROGRESS NOTES
Pharmacy Pharmacotherapy Consult   LOS >30 days  Admit Date: 12/28/2018    Medications were reviewed for appropriateness and ongoing need.   Current Facility-Administered Medications   Medication Dose Route Frequency Provider Last Rate Last Dose   • risperiDONE (RISPERDAL) tablet 3 mg  3 mg Oral Q EVENING Lena Patel M.D.   3 mg at 02/26/19 1804   • diphenhydrAMINE (BENADRYL) tablet/capsule 25 mg  25 mg Oral Q6HRS PRN Tamy Sanchez D.O.       • nystatin (MYCOSTATIN) powder   Topical BID DANGELO Kirkland.O.       • oxyCODONE immediate-release (ROXICODONE) tablet 5 mg  5 mg Oral Q6HRS PRN DANGELO Kirkland.OVane   5 mg at 02/23/19 0808   • ibuprofen (MOTRIN) tablet 400 mg  400 mg Oral Q6HRS PRN DANGELO Kirkland.O.       • fludrocortisone (FLORINEF) tablet 0.2 mg  0.2 mg Oral QAM Ashok Lazo M.D.   0.2 mg at 02/27/19 0557   • enoxaparin (LOVENOX) inj 40 mg  40 mg Subcutaneous DAILY Ashok Lazo M.D.   40 mg at 02/27/19 0555   • pravastatin (PRAVACHOL) tablet 20 mg  20 mg Oral DAILY Ashok Lazo M.D.   20 mg at 02/26/19 1804   • cyanocobalamin (VITAMIN B-12) tablet 1,000 mcg  1,000 mcg Oral DAILY Ashok Lazo M.D.   1,000 mcg at 02/27/19 0557   • midodrine (PROAMATINE) tablet 10 mg  10 mg Oral TID Ashok Lazo M.D.   10 mg at 02/27/19 0556   • FLUoxetine (PROZAC) capsule 60 mg  60 mg Oral DAILY Lena Patel M.D.   60 mg at 02/27/19 0557   • aspirin (ASA) chewable tab 81 mg  81 mg Oral DAILY Catrachita Palencia M.D.   81 mg at 02/27/19 0557   • metoclopramide (REGLAN) tablet 10 mg  10 mg Oral TID AC Catrachita Palencia M.D.   10 mg at 02/27/19 0557   • pyridostigmine (MESTINON) tablet 90 mg  90 mg Oral TID Catrachita Palencia M.D.   90 mg at 02/27/19 0556   • sodium chloride (SALT) tablet 1 g  1 g Oral TID WITH MEALS Catrachita Palencia M.D.   1 g at 02/27/19 0754   • therapeutic multivitamin-minerals (THERAGRAN-M) tablet 1 Tab  1 Tab Oral DAILY Catrachita Palencia M.D.   1 Tab at  02/27/19 0556   • ascorbic acid tablet 500 mg  500 mg Oral DAILY Catrachita Palencia M.D.   500 mg at 02/27/19 0557   • magnesium oxide (MAG-OX) tablet 400 mg  400 mg Oral DAILY Catrachita Palencia M.D.   400 mg at 02/27/19 0556   • amiodarone (CORDARONE) tablet 50 mg  50 mg Oral DAILY Aaron Wheeler M.D.   50 mg at 02/27/19 0557   • acetaminophen (TYLENOL) tablet 650 mg  650 mg Oral Q6HRS PRN Aaron Wheeler M.D.   650 mg at 02/27/19 0758   • senna-docusate (PERICOLACE or SENOKOT S) 8.6-50 MG per tablet 2 Tab  2 Tab Oral BID Aaron Wheeler M.D.   2 Tab at 02/27/19 0556    And   • polyethylene glycol/lytes (MIRALAX) PACKET 1 Packet  1 Packet Oral QDAY PRN Aaron Wheeler M.D.        And   • magnesium hydroxide (MILK OF MAGNESIA) suspension 30 mL  30 mL Oral QDAY PRN Aaron Wheeler M.D.        And   • bisacodyl (DULCOLAX) suppository 10 mg  10 mg Rectal QDAY PRN Aaron Wheeler M.D.       • Pharmacy Consult Request ...Pain Management Review 1 Each  1 Each Other PRN Colt Denton M.D.       • oxyCODONE immediate-release (ROXICODONE) tablet 2.5 mg  2.5 mg Oral Q3HRS PRN Colt Denton M.D.   2.5 mg at 01/25/19 0850     Recommendations:  1. Pt has been refusing scheduled senna-docusate often in the last week. Recommend switching to scheduled once a day dosing or PRN.    2. Miralax and milk of mag not use in last month; recommend discontinuing.     3. Tdap vaccine indicated; recommend administering.     Elodia Borges, Pharmacy Intern

## 2019-02-27 NOTE — DISCHARGE PLANNING
[facility] to accept this referral pending patient's completion of appointment to establish with MILLICENT Rosas  scheduled for 2/28/2019 @ 3:00PM.  Thank you!

## 2019-02-27 NOTE — DISCHARGE PLANNING
Please provide an address for the [facility] facility that patient will be discharging to. Also per Johnna (BRANDON) patient will establish with their PCP once DC'd. It is a walk in basis so she will reach out to Laura with [facility] to advise patient must make appointment.    Thank you

## 2019-02-27 NOTE — DISCHARGE PLANNING
Anticipated Discharge Disposition: [facility]    Action: LSW contacted [facility] to discuss referral. Pt will go to [facility] for PCP. Pt will need appt with Clinic prior to discharging for [facility] to accept.     LSW contacted Yasmin with [facility]. Yasmin to set up appt for PCP and inform LSW.    Barriers to Discharge: None    Plan: Await PCP appt.    Update: Yasmin returned call with appt set up for 1500 pm. LSW contacted [facility] to inform. [facility] accepting pt pending PCP appt.

## 2019-02-27 NOTE — DISCHARGE PLANNING
Received Transport Form @ 1100  Spoke to Jackie @ [facility]    Transport is scheduled for [facility] @1430 going to [facility].  Jackie will call this [facility] back with Job #.

## 2019-02-27 NOTE — PSYCHIATRY
"PSYCHIATRIC FOLLOW UP:    Reason for consult: si  Consulting phsyician: Slime      HPI:     Sebastián Castro is a 53 y.o. year old male with a PMH of CHF, a fib, DM, HTN,  a fib s/p cardioversion, orthostatic hypotentison with depressed mood, PTSD, and some functional seizures. He was also noted to have an L1 compression fracture.     Reporting considerable improvement. He denies suicidal ideation today. Reports he stil have some occasional ah, but they are \"fading\". Not sure if he is truly having ah, as he has never appeared internally stimulated and they seemed to cease when going into a group home seemed a more possible option for him. Nonetheless, [facility] has [facility] to [facility]. He denies that he will harm himself in any way at the group home. He is smiling and interacting well.     Psychiatric Examination: observed phenomenon:  Vitals: BP (!) 162/87 Comment: rn notified  Pulse 64   Temp 36.4 °C (97.5 °F) (Temporal)   Resp 16   Ht 1.829 m (6')   Wt 118.9 kg (262 lb 2 oz)   SpO2 95%   BMI 35.55 kg/m²  Body mass index is 35.55 kg/m².  Musculoskeletal no psychomotor agitatio or retardation.   Appearance: Grooming wnl   Thoughts Process: Logical and sequential, goal-directed   Thought Content: no evidence of delusions, no ideas of reference, no internal stimulation noted; reporting auditory hallucinations although we have concerns about his reporting   Speech: Nl tone, rate, and volume. Not pressured. Understandable.   Mood:    depressed  Affect:    Full, congruent  SI/HI:   Denies   Attention/Alertness:   Awake, alert. Attention wnl   Memory:    Grossly intact.   Orientation:    Grossly intact.   Cognition:Grossly intact.   Insight: decreased   Judgement:  Decreased   Neurological Testing:    Medical systems reviewed:   Eyes: no blurry vision no redness   Skin:no rash   CV:no cp no palpitations    Lungs:no sob   Neuro: weakness  GI:no n/v, no diarrhea  :no dysuria   Endocrine:hypotentsion   Psych:see hpi "   Musculoskeletal:   All other systems reviewed and are negative.       Soc history:    Reports he has been accepted to [facility].     Lab results/tests:   Recent Results (from the past 48 hour(s))   Basic Metabolic Panel    Collection Time: 02/27/19 12:56 AM   Result Value Ref Range    Sodium 137 135 - 145 mmol/L    Potassium 3.8 3.6 - 5.5 mmol/L    Chloride 105 96 - 112 mmol/L    Co2 28 20 - 33 mmol/L    Glucose 97 65 - 99 mg/dL    Bun 32 (H) 8 - 22 mg/dL    Creatinine 0.79 0.50 - 1.40 mg/dL    Calcium 8.9 8.5 - 10.5 mg/dL    Anion Gap 4.0 0.0 - 11.9   ESTIMATED GFR    Collection Time: 02/27/19 12:56 AM   Result Value Ref Range    GFR If African American >60 >60 mL/min/1.73 m 2    GFR If Non African American >60 >60 mL/min/1.73 m 2     DX-HIP-UNILATERAL-WITH PELVIS-1 VIEW RIGHT   Final Result      No radiographic evidence of acute traumatic injury right hip.      DX-CHEST-PORTABLE (1 VIEW)   Final Result      Hypoinflation without other evidence for acute cardiopulmonary disease.      MR-BRAIN-W/O   Final Result      1.  MRI of the brain without contrast within normal limits for age with mild white matter changes.   2.  Chronic appearing sinus disease   3.  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 This does not have an aggressive appearance.      US-EXTREMITY VENOUS LOWER BILAT   Final Result      CT-HAND WITH RIGHT   Final Result      1.  Edema in the dorsum of the forearm and hand without a discrete fluid collection to suggest abscess is identified.      US-CAROTID DOPPLER BILAT   Final Result      DX-LUMBAR SPINE-2 OR 3 VIEWS   Final Result         Stable compression deformity in the L1 vertebral body.      CT-CTA CHEST PULMONARY ARTERY W/ RECONS   Final Result         1. No CT evidence of pulmonary embolism.      2. Old right rib fractures.      3. Cholelithiasis. Enlarged spleen with granulomas.      4. Mild superior endplate compression deformity of L1 could be  acute. Correlate with point tenderness. No retropulsion. No malalignment.      CT-CSPINE WITHOUT PLUS RECONS   Final Result         1. No acute fracture from C1 through T3 is visualized.         CT-HEAD W/O   Final Result         1. No acute intracranial abnormality. No evidence of acute intracranial hemorrhage or mass lesion.               DX-THORACIC SPINE-WITH SWIMMERS VIEW   Final Result      No acute thoracic spine fracture or subluxation identified.      DX-CHEST-PORTABLE (1 VIEW)   Final Result      No acute cardiac or pulmonary abnormality is noted.               Assessment:  MDD, in partial remission           Plan:  Continue current medications  D/c legal hold.     Signing off, thank you for the consult.

## 2019-02-27 NOTE — DISCHARGE INSTRUCTIONS
Discharge Instructions    Discharged to [facility] by medical transportation with escort. Discharged via wheelchair, hospital escort: Yes.  Special equipment needed: TLSO    Be sure to schedule a follow-up appointment with your primary care doctor or any specialists as instructed.     Discharge Plan:   Influenza Vaccine Indication: Not indicated: Previously immunized this influenza season and > 8 years of age    I understand that a diet low in cholesterol, fat, and sodium is recommended for good health. Unless I have been given specific instructions below for another diet, I accept this instruction as my diet prescription.   Other diet: Regular     Special Instructions: None    · Is patient discharged on Warfarin / Coumadin?   No     Depression / Suicide Risk    As you are discharged from this [facility] facility, it is important to learn how to keep safe from harming yourself.    Recognize the warning signs:  · Abrupt changes in personality, positive or negative- including increase in energy   · Giving away possessions  · Change in eating patterns- significant weight changes-  positive or negative  · Change in sleeping patterns- unable to sleep or sleeping all the time   · Unwillingness or inability to communicate  · Depression  · Unusual sadness, discouragement and loneliness  · Talk of wanting to die  · Neglect of personal appearance   · Rebelliousness- reckless behavior  · Withdrawal from people/activities they love  · Confusion- inability to concentrate     If you or a loved one observes any of these behaviors or has concerns about self-harm, here's what you can do:  · Talk about it- your feelings and reasons for harming yourself  · Remove any means that you might use to hurt yourself (examples: pills, rope, extension cords, firearm)  · Get professional help from the community (Mental Health, Substance Abuse, psychological counseling)  · Do not be alone:Call your Safe Contact- someone whom you trust who will  be there for you.  · Call your local CRISIS HOTLINE 411-4846 or 146-193-5656  · Call your local Children's Mobile Crisis Response Team [place] (646) 430-9409 or www.Hitlab  · Call the toll free National Suicide Prevention Hotlines   · National Suicide Prevention Lifeline 622-779-RWJO (1390)  · i'mma Line Network 800-SUICIDE (727-9721)      Lumbar Fracture  Introduction  A lumbar fracture is a break in one of the bones of the lower back. Lumbar fractures range in severity. Severe fractures can damage the spinal cord.  What are the causes?  This condition may be caused by:  · A fall (common).  · A car accident (common).  · A gunshot wound.  · A hard, direct hit to the back.  · Osteoporosis.  What are the signs or symptoms?  The main symptom of this condition is severe pain in the lower back. If a fracture is complex or severe, there may also be:  · A misshapen or swollen area on the lower back.  · A limited ability to move an area of the lower back.  · An inability to empty the bladder or bowel.  · A loss of strength or sensation in the legs, feet, and toes.  · Paralysis.  How is this diagnosed?  This condition is diagnosed based on:  · A physical exam.  · Symptoms and what happened just before they developed.  · The results of imaging tests, such as an X-ray, CT scan, or MRI.  If your nerves have been damaged, you may also have other tests to find out how much damage there is.  How is this treated?  Treatment for this condition depends on the specifics of the injury. Most fractures can be treated with:  · A back brace.  · Bed rest and activity restrictions.  · Pain medicine.  · Physical therapy.  Fractures that are complex, involve multiple bones, or make the spine unstable may require surgery to remove pressure from the nerves or spinal cord and to stabilize the broken pieces of bone.  During recovery, it is normal to have pain and stiffness in the back for weeks.  Follow these instructions  at home:  Medicines  · Take medicines only as directed by your health care provider.  · Do not drive or operate heavy machinery while taking pain medicine.  Activity  · Stay in bed for as long as directed by your health care provider.  · If you were shown how to do any exercises to improve motion and strength in your back, do them as directed by your health care provider.  · Return to your normal activities as directed by your health care provider. Ask your health care provider what activities are safe for you.  General instructions  · If you were given a neck brace or back brace, wear it as directed by your health care provider.  · Keep all follow-up visits as directed by your health care provider. This is important. Failure to follow-up as recommended could result in permanent injury, disability, and long-lasting (chronic) pain.  Contact a health care provider if:  · Your pain does not improve over time.  · You have a persistent cough.  · You cannot return to your normal activities as planned or expected.  Get help right away if:  · You have severe pain or your pain suddenly gets worse.  · You are unable to move.  · You have numbness, tingling, weakness, or paralysis in any part of your body.  · You cannot control your bladder or bowel.  · You have difficulty breathing.  · You have a fever.  · You have pain in your chest or abdomen.  · You vomit.  This information is not intended to replace advice given to you by your health care provider. Make sure you discuss any questions you have with your health care provider.  Document Released: 04/03/2008 Document Revised: 05/25/2017 Document Reviewed: 12/14/2015  © 2017 Elsevier    Midodrine tablets  What is this medicine?  MIDODRINE (MI bhavesh joseph) is used to treat low blood pressure in patients who have symptoms like dizziness when going from a sitting to a standing position.  This medicine may be used for other purposes; ask your health care provider or pharmacist if you  have questions.  COMMON BRAND NAME(S): Yeison Paz  What should I tell my health care provider before I take this medicine?  They need to know if you have any of the following conditions:  -difficulty passing urine  -heart disease  -high blood pressure  -kidney disease  -over active thyroid  -pheochromocytoma  -an unusual or allergic reaction to midodrine, other medicines, foods, dyes, or preservatives  -pregnant or trying to get pregnant  -breast-feeding  How should I use this medicine?  Take this medicine by mouth with a glass of water. Follow the directions on the prescription label. The last dose of this medicine should not be taken after the evening meal or less than 4 hours before bedtime. When you lie down for any length of time after taking this medicine, high blood pressure can occur. Do not take this medicine if you will be lying down for any length of time. Do not take your medicine more often than directed. Do not stop taking except on your doctor's advice.  Talk to your pediatrician regarding the use of this medicine in children. Special care may be needed.  Overdosage: If you think you have taken too much of this medicine contact a poison control center or emergency room at once.  NOTE: This medicine is only for you. Do not share this medicine with others.  What if I miss a dose?  If you miss a dose, take it as soon as you can. If it is almost time for your next dose, take only that dose. Do not take double or extra doses.  What may interact with this medicine?  Do not take this medicine with any of the following medications:  -MAOIs like Carbex, Eldepryl, Marplan, Nardil, and Parnate  -medicines called ergot alkaloids  -medicines for colds and breathing difficulties or weight loss  -procarbazine  This medicine may also interact with the following medications:  -cimetidine  -digoxin  -flecainide  -fludrocortisone  -metformin  -procainamide  -quinidine  -ranitidine  -triamterene  -medicines  called alpha-blockers like doxazosin, prazosin, and terazosin  This list may not describe all possible interactions. Give your health care provider a list of all the medicines, herbs, non-prescription drugs, or dietary supplements you use. Also tell them if you smoke, drink alcohol, or use illegal drugs. Some items may interact with your medicine.  What should I watch for while using this medicine?  Visit your doctor or health care professional for regular checks on your progress.  You may get drowsy or dizzy. Do not drive, use machinery, or do anything that needs mental alertness until you know how this medicine affects you. Do not stand or sit up quickly, especially if you are an older patient. This reduces the risk of dizzy or fainting spells.  Your mouth may get dry. Chewing sugarless gum or sucking hard candy, and drinking plenty of water may help. Contact your doctor if the problem does not go away or is severe.  Do not treat yourself for coughs, colds, or pain while you are taking this medicine without asking your doctor or health care professional for advice. Some ingredients may increase your blood pressure.  What side effects may I notice from receiving this medicine?  Side effects that you should report to your doctor or health care professional as soon as possible:  -awareness of heart beating  -blurred vision  -headache  -irregular heartbeat, palpitations, or chest pain  -pounding in the ears  -skin rash, hives  Side effects that usually do not require medical attention (report to your doctor or health care professional if they continue or are bothersome):  -change in heart rate  -chills  -goose bumps  -increased need to urinate  -itching  -stomach pain  -tingling in the skin or scalp  This list may not describe all possible side effects. Call your doctor for medical advice about side effects. You may report side effects to FDA at 4-617-FDA-2244.  Where should I keep my medicine?  Keep out of the reach  of children.  Store at room temperature between 15 and 30 degrees C (59 and 86 degrees F). Throw away any unused medicine after the expiration date.  NOTE: This sheet is a summary. It may not cover all possible information. If you have questions about this medicine, talk to your doctor, pharmacist, or health care provider.  © 2018 Elsevier/Gold Standard (2009-07-06 13:51:24)    Risperidone tablets  What is this medicine?  RISPERIDONE (ris PER i done) is an antipsychotic. It is used to treat schizophrenia, bipolar disorder, and some symptoms of autism.  This medicine may be used for other purposes; ask your health care provider or pharmacist if you have questions.  COMMON BRAND NAME(S): Risperdal  What should I tell my health care provider before I take this medicine?  They need to know if you have any of these conditions:  -blood disorder or disease  -dementia  -diabetes or a family history of diabetes  -difficulty swallowing  -heart disease or previous heart attack  -history of brain tumor or head injury  -history of breast cancer  -irregular heartbeat or low blood pressure  -kidney or liver disease  -Parkinson's disease  -seizures (convulsions)  -an unusual or allergic reaction to risperidone, paliperidone, other medicines, foods, dyes, or preservatives  -pregnant or trying to get pregnant  -breast-feeding  How should I use this medicine?  Take this medicine by mouth with a glass of water. Follow the directions on the prescription label. You can take it with or without food. If it upsets your stomach, take it with food. Take your medicine at regular intervals. Do not take it more often than directed. Do not stop taking except on your doctor's advice.  Talk to your pediatrician regarding the use of this medicine in children. While this drug may be prescribed for children as young as 5 years of age for selected conditions, precautions do apply.  Overdosage: If you think you have taken too much of this medicine  contact a poison control center or emergency room at once.  NOTE: This medicine is only for you. Do not share this medicine with others.  What if I miss a dose?  If you miss a dose, take it as soon as you can. If it is almost time for your next dose, take only that dose. Do not take double or extra doses.  What may interact with this medicine?  Do not take this medicine with any of the following medications:  -certain medicines for fungal infections like fluconazole, itraconazole, ketoconazole, posaconazole, voriconazole  -cisapride  -dofetilide  -dronedarone  -droperidol  -pimozide  -sparfloxacin  -thioridazine  This medicine may also interact with the following medications:  -arsenic trioxide  -certain antibiotics like clarithromycin, gatifloxacin, levofloxacin, moxifloxacin, pentamidine, rifampin  -certain medicines for blood pressure  -certain medicines for cancer  -certain medicines for irregular heart beat  -certain medications for Parkinson's disease like levodopa  -certain medicines for seizures like carbamazepine  -certain medicines for sleep or sedation  -narcotic medicines for pain  -other medicines for mental anxiety, depression, or psychotic disturbances  -other medicines that prolong the QT interval (cause an abnormal heart rhythm)  -ritonavir  This list may not describe all possible interactions. Give your health care provider a list of all the medicines, herbs, non-prescription drugs, or dietary supplements you use. Also tell them if you smoke, drink alcohol, or use illegal drugs. Some items may interact with your medicine.  What should I watch for while using this medicine?  Visit your doctor or health care professional for regular checks on your progress. It may be several weeks before you see the full effects. Do not suddenly stop taking this medicine. You may need to gradually reduce the dose. Only stop taking this medicine on the advice of your doctor or health care professional.  You may get  dizzy or drowsy. Do not drive, use machinery, or do anything that needs mental alertness until you know how this medicine affects you. Do not stand or sit up quickly, especially if you are an older patient. This reduces the risk of dizzy or fainting spells. Alcohol can increase dizziness and drowsiness. Avoid alcoholic drinks. You can get a hangover effect the morning after a bedtime dose.  Do not treat yourself for colds, diarrhea or allergies. Ask your doctor or health care professional for advice, some nonprescription medicines may increase possible side effects.  This medicine can reduce the response of your body to heat or cold. Dress warm in cold weather and stay hydrated in hot weather. If possible, avoid extreme temperatures like saunas, hot tubs, very hot or cold showers, or activities that can cause dehydration such as vigorous exercise.  What side effects may I notice from receiving this medicine?  Side effects that you should report to your doctor or health care professional as soon as possible:  -aching muscles and joints  -confusion  -fainting spells  -fast or irregular heartbeat  -fever or chills, sore throat  -increased hunger or thirst  -increased urination  -loss of balance, difficulty walking or falls  -stiffness, spasms, trembling  -uncontrollable head, mouth, neck, arm, or leg movements  -unusually weak or tired  Side effects that usually do not require medical attention (report to your doctor or health care professional if they continue or are bothersome):  -constipation  -decreased sexual ability  -difficulty sleeping  -drowsiness or dizziness  -increase or decrease in saliva  -nausea, vomiting  -weight gain  This list may not describe all possible side effects. Call your doctor for medical advice about side effects. You may report side effects to FDA at 9-693-FDA-4259.  Where should I keep my medicine?  Keep out of the reach of children.  Store at room temperature between 15 and 25 degrees C  (59 and 77 degrees F). Protect from light. Throw away any unused medicine after the expiration date.  NOTE: This sheet is a summary. It may not cover all possible information. If you have questions about this medicine, talk to your doctor, pharmacist, or health care provider.  © 2018 Elsevier/Gold Standard (2017-08-07 18:50:25)

## 2019-02-27 NOTE — CARE PLAN
Problem: Discharge Barriers/Planning  Goal: Patient's continuum of care needs will be met    Intervention: Assess potential discharge barriers on admission and throughout hospital stay  Legal hold discontinued this morning by psychiatry.       Problem: Mobility  Goal: Risk for activity intolerance will decrease  Outcome: PROGRESSING AS EXPECTED  Patient out of bed for meals. Ambulating frequently throughout shift.

## 2019-02-27 NOTE — FACE TO FACE
Face to Face Supporting Documentation - Home Health    The encounter with this patient was in whole or in part the primary reason for home health admission.    Date of encounter:   Patient:                    MRN:                       YOB: 2021  Sebastián Castro  [id number]  1965     Home health to see patient for:  Skilled Nursing care for assessment, interventions & education, Physical Therapy evaluation and treatment and Occupational therapy evaluation and treatment    Skilled need for:  Recent Deterioration of Health Status fall, orthostatic hypotension    Skilled nursing interventions to include:  Comment: medication education     Homebound status evidenced by:  Need the aid of supportive devices such as crutches, canes, wheelchairs or walkers or Needs the assistance of another person in order to leave the home. Leaving home requires a considerable and taxing effort. There is a normal inability to leave the home.    Community Physician to provide follow up care: German Hinton M.D.     Optional Interventions? No      I certify the face to face encounter for this home health care referral meets the CMS requirements and the encounter/clinical assessment with the patient was, in whole, or in part, for the medical condition(s) listed above, which is the primary reason for home health care. Based on my clinical findings: the service(s) are medically necessary, support the need for home health care, and the homebound criteria are met.  I certify that this patient has had a face to face encounter by myself.  Sarah Fang M.D. - NPI: [id number]

## 2019-02-27 NOTE — CARE PLAN
Problem: Pain Management  Goal: Pain level will decrease to patient's comfort goal  Outcome: PROGRESSING AS EXPECTED  Pt pain 3/10 to back. Declines medication at this time. encouraged repositioning, breathing exercises, and to call if pain increases    Problem: Safety  Goal: Will remain free from falls  Outcome: PROGRESSING AS EXPECTED  Bed alarm on. Pt educated on importance of calling for assistance. Fall risk band and sign on. Bed low and locked. Call light within reach. Non slip socks on, hourly rounding.

## 2019-02-27 NOTE — DISCHARGE PLANNING
Received Choice form at 1004  Agency/Facility Name: [facility]  Referral sent per Choice form @ [phone]

## 2019-02-27 NOTE — PROGRESS NOTES
Pt A&O x4. Pt still on legal hold. VSS on RA. 2/10 pain to lower back. Declined medication at this time. No c/o nausea. Pt in bed and sleeping since shift change report. Fall precautions in place. Bed alarm on, bed low and locked, call light within reach, and hourly rounding. Will continue to monitor throughout shift.

## 2019-02-27 NOTE — PROGRESS NOTES
Assumed patient care at 0700. Received report from night shift. Legal hold discontinued this am. Assessment completed. A&Ox 4. C/o 7/10 back pain, medicated per MAR. Low fall risk, precautions in place; treaded socks on pt, personal possessions and call light placed within reach. Pt ambulating halls throughout shift. POC discussed with pt, communication board updated. Patient denies any additional needs at this time.

## 2019-02-28 NOTE — PROGRESS NOTES
Patient discharged to [facility]. [facility]'ed. Prescriptions given, [facility] to assist with filling medications. Discharge instructions given specifically involving: diagnosis, new and continued medications. Patient verbalized understanding, 2nd copy of AVS signed and placed in patient chart. Patient off floor with [facility] transport, verbalizes all belongings are accounted for.

## 2019-03-01 ENCOUNTER — TELEPHONE (OUTPATIENT)
Dept: VASCULAR LAB | Facility: MEDICAL CENTER | Age: 54
End: 2019-03-01

## 2019-03-01 NOTE — TELEPHONE ENCOUNTER
Closed anticoagulation referral, pt was D/C'ed on ASA instead of Xarelto due to fall risk.     Sharon Engel, PharmD

## 2019-04-09 NOTE — ED TRIAGE NOTES
"Pt in by EMS from a shelter. Pt c/o bilateral lower extremity swelling and a rash x \"2 days\". Pt denies CP or SOB. Pt states swelling started more after starting amiodarone this past Thursday. Pt placed on cardiac monitor and continuous spO2.  " [id number] Natty Michaud for patient to eat soft foods, Per NIK Ugarte     [facility], [facility]  04/09/19 5898

## 2019-08-06 NOTE — CARE PLAN
Problem: Knowledge Deficit  Goal: Knowledge of disease process/condition, treatment plan, diagnostic tests, and medications will improve  Pt able to verbalize understanding of poc.       No

## 2019-10-19 ENCOUNTER — APPOINTMENT (OUTPATIENT)
Dept: RADIOLOGY | Facility: MEDICAL CENTER | Age: 54
End: 2019-10-19
Attending: EMERGENCY MEDICINE
Payer: MEDICAID

## 2019-10-19 ENCOUNTER — HOSPITAL ENCOUNTER (OUTPATIENT)
Facility: MEDICAL CENTER | Age: 54
End: 2019-10-26
Attending: EMERGENCY MEDICINE | Admitting: HOSPITALIST
Payer: MEDICAID

## 2019-10-19 DIAGNOSIS — R53.81 DEBILITY: ICD-10-CM
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
## 2019-10-19 DIAGNOSIS — I48.91 ATRIAL FIBRILLATION WITH RAPID VENTRICULAR RESPONSE (HCC): ICD-10-CM

## 2019-10-19 DIAGNOSIS — R55 SYNCOPE, UNSPECIFIED SYNCOPE TYPE: ICD-10-CM

## 2019-10-19 DIAGNOSIS — E66.9 CLASS 1 OBESITY WITH BODY MASS INDEX (BMI) OF 34.0 TO 34.9 IN ADULT, UNSPECIFIED OBESITY TYPE, UNSPECIFIED WHETHER SERIOUS COMORBIDITY PRESENT: ICD-10-CM

## 2019-10-19 DIAGNOSIS — I50.9 ACUTE ON CHRONIC CONGESTIVE HEART FAILURE, UNSPECIFIED HEART FAILURE TYPE (HCC): ICD-10-CM

## 2019-10-19 PROBLEM — R73.03 PRE-DIABETES: Status: ACTIVE | Noted: 2019-10-19

## 2019-10-19 LAB
ALBUMIN SERPL BCP-MCNC: 4.1 G/DL (ref 3.2–4.9)
ALBUMIN/GLOB SERPL: 1 G/DL
ALP SERPL-CCNC: 87 U/L (ref 30–99)
ALT SERPL-CCNC: 29 U/L (ref 2–50)
ANION GAP SERPL CALC-SCNC: 10 MMOL/L (ref 0–11.9)
APTT PPP: 27.7 SEC (ref 24.7–36)
AST SERPL-CCNC: 31 U/L (ref 12–45)
BASOPHILS # BLD AUTO: 0.5 % (ref 0–1.8)
BASOPHILS # BLD: 0.04 K/UL (ref 0–0.12)
BILIRUB SERPL-MCNC: 0.7 MG/DL (ref 0.1–1.5)
BUN SERPL-MCNC: 40 MG/DL (ref 8–22)
CALCIUM SERPL-MCNC: 10.1 MG/DL (ref 8.5–10.5)
CHLORIDE SERPL-SCNC: 98 MMOL/L (ref 96–112)
CO2 SERPL-SCNC: 27 MMOL/L (ref 20–33)
CREAT SERPL-MCNC: 1.25 MG/DL (ref 0.5–1.4)
D DIMER PPP IA.FEU-MCNC: 2.16 UG/ML (FEU) (ref 0–0.5)
EKG IMPRESSION: NORMAL
EKG IMPRESSION: NORMAL
EOSINOPHIL # BLD AUTO: 0.23 K/UL (ref 0–0.51)
EOSINOPHIL NFR BLD: 2.9 % (ref 0–6.9)
ERYTHROCYTE [DISTWIDTH] IN BLOOD BY AUTOMATED COUNT: 39.6 FL (ref 35.9–50)
GLOBULIN SER CALC-MCNC: 4.3 G/DL (ref 1.9–3.5)
GLUCOSE SERPL-MCNC: 161 MG/DL (ref 65–99)
HCT VFR BLD AUTO: 51.1 % (ref 42–52)
HGB BLD-MCNC: 16.9 G/DL (ref 14–18)
IMM GRANULOCYTES # BLD AUTO: 0.06 K/UL (ref 0–0.11)
IMM GRANULOCYTES NFR BLD AUTO: 0.8 % (ref 0–0.9)
INR PPP: 0.97 (ref 0.87–1.13)
LYMPHOCYTES # BLD AUTO: 2.41 K/UL (ref 1–4.8)
LYMPHOCYTES NFR BLD: 30.4 % (ref 22–41)
MAGNESIUM SERPL-MCNC: 2 MG/DL (ref 1.5–2.5)
MCH RBC QN AUTO: 28.5 PG (ref 27–33)
MCHC RBC AUTO-ENTMCNC: 33.1 G/DL (ref 33.7–35.3)
MCV RBC AUTO: 86.2 FL (ref 81.4–97.8)
MONOCYTES # BLD AUTO: 0.53 K/UL (ref 0–0.85)
MONOCYTES NFR BLD AUTO: 6.7 % (ref 0–13.4)
NEUTROPHILS # BLD AUTO: 4.65 K/UL (ref 1.82–7.42)
NEUTROPHILS NFR BLD: 58.7 % (ref 44–72)
NRBC # BLD AUTO: 0 K/UL
NRBC BLD-RTO: 0 /100 WBC
NT-PROBNP SERPL IA-MCNC: 1451 PG/ML (ref 0–125)
PLATELET # BLD AUTO: 186 K/UL (ref 164–446)
PMV BLD AUTO: 10.7 FL (ref 9–12.9)
POTASSIUM SERPL-SCNC: 4.2 MMOL/L (ref 3.6–5.5)
PROT SERPL-MCNC: 8.4 G/DL (ref 6–8.2)
PROTHROMBIN TIME: 13.1 SEC (ref 12–14.6)
RBC # BLD AUTO: 5.93 M/UL (ref 4.7–6.1)
SODIUM SERPL-SCNC: 135 MMOL/L (ref 135–145)
TROPONIN T SERPL-MCNC: 37 NG/L (ref 6–19)
TROPONIN T SERPL-MCNC: 39 NG/L (ref 6–19)
WBC # BLD AUTO: 7.9 K/UL (ref 4.8–10.8)

## 2019-10-19 PROCEDURE — 96374 THER/PROPH/DIAG INJ IV PUSH: CPT | Mod: XU

## 2019-10-19 PROCEDURE — 85730 THROMBOPLASTIN TIME PARTIAL: CPT

## 2019-10-19 PROCEDURE — 83735 ASSAY OF MAGNESIUM: CPT

## 2019-10-19 PROCEDURE — 85025 COMPLETE CBC W/AUTO DIFF WBC: CPT

## 2019-10-19 PROCEDURE — 99285 EMERGENCY DEPT VISIT HI MDM: CPT

## 2019-10-19 PROCEDURE — 99244 OFF/OP CNSLTJ NEW/EST MOD 40: CPT | Performed by: INTERNAL MEDICINE

## 2019-10-19 PROCEDURE — G0378 HOSPITAL OBSERVATION PER HR: HCPCS

## 2019-10-19 PROCEDURE — 700117 HCHG RX CONTRAST REV CODE 255: Performed by: EMERGENCY MEDICINE

## 2019-10-19 PROCEDURE — 85379 FIBRIN DEGRADATION QUANT: CPT

## 2019-10-19 PROCEDURE — 700102 HCHG RX REV CODE 250 W/ 637 OVERRIDE(OP): Performed by: HOSPITALIST

## 2019-10-19 PROCEDURE — A9270 NON-COVERED ITEM OR SERVICE: HCPCS | Performed by: EMERGENCY MEDICINE

## 2019-10-19 PROCEDURE — 83880 ASSAY OF NATRIURETIC PEPTIDE: CPT

## 2019-10-19 PROCEDURE — A9270 NON-COVERED ITEM OR SERVICE: HCPCS | Performed by: HOSPITALIST

## 2019-10-19 PROCEDURE — 71275 CT ANGIOGRAPHY CHEST: CPT

## 2019-10-19 PROCEDURE — 700102 HCHG RX REV CODE 250 W/ 637 OVERRIDE(OP): Performed by: EMERGENCY MEDICINE

## 2019-10-19 PROCEDURE — 71045 X-RAY EXAM CHEST 1 VIEW: CPT

## 2019-10-19 PROCEDURE — 700111 HCHG RX REV CODE 636 W/ 250 OVERRIDE (IP): Performed by: EMERGENCY MEDICINE

## 2019-10-19 PROCEDURE — 36415 COLL VENOUS BLD VENIPUNCTURE: CPT

## 2019-10-19 PROCEDURE — 93005 ELECTROCARDIOGRAM TRACING: CPT | Performed by: EMERGENCY MEDICINE

## 2019-10-19 PROCEDURE — 84484 ASSAY OF TROPONIN QUANT: CPT

## 2019-10-19 PROCEDURE — 80053 COMPREHEN METABOLIC PANEL: CPT

## 2019-10-19 PROCEDURE — 85610 PROTHROMBIN TIME: CPT

## 2019-10-19 PROCEDURE — 99220 PR INITIAL OBSERVATION CARE,LEVL III: CPT | Performed by: HOSPITALIST

## 2019-10-19 PROCEDURE — 94760 N-INVAS EAR/PLS OXIMETRY 1: CPT

## 2019-10-19 RX ORDER — CHOLECALCIFEROL (VITAMIN D3) 125 MCG
1000 CAPSULE ORAL DAILY
Status: DISCONTINUED | OUTPATIENT
Start: 2019-10-20 | End: 2019-10-26 | Stop reason: HOSPADM

## 2019-10-19 RX ORDER — ACETAMINOPHEN 325 MG/1
650 TABLET ORAL EVERY 6 HOURS PRN
Status: DISCONTINUED | OUTPATIENT
Start: 2019-10-19 | End: 2019-10-26 | Stop reason: HOSPADM

## 2019-10-19 RX ORDER — POLYETHYLENE GLYCOL 3350 17 G/17G
1 POWDER, FOR SOLUTION ORAL
Status: DISCONTINUED | OUTPATIENT
Start: 2019-10-19 | End: 2019-10-26 | Stop reason: HOSPADM

## 2019-10-19 RX ORDER — AMOXICILLIN 250 MG
2 CAPSULE ORAL 2 TIMES DAILY
Status: DISCONTINUED | OUTPATIENT
Start: 2019-10-19 | End: 2019-10-26 | Stop reason: HOSPADM

## 2019-10-19 RX ORDER — MIDODRINE HYDROCHLORIDE 5 MG/1
10 TABLET ORAL 3 TIMES DAILY
Status: DISCONTINUED | OUTPATIENT
Start: 2019-10-19 | End: 2019-10-24

## 2019-10-19 RX ORDER — SODIUM CHLORIDE 1 G/1
1 TABLET ORAL
Status: DISCONTINUED | OUTPATIENT
Start: 2019-10-19 | End: 2019-10-22

## 2019-10-19 RX ORDER — ONDANSETRON 4 MG/1
4 TABLET, ORALLY DISINTEGRATING ORAL EVERY 4 HOURS PRN
Status: DISCONTINUED | OUTPATIENT
Start: 2019-10-19 | End: 2019-10-26 | Stop reason: HOSPADM

## 2019-10-19 RX ORDER — PROCHLORPERAZINE EDISYLATE 5 MG/ML
5-10 INJECTION INTRAMUSCULAR; INTRAVENOUS EVERY 4 HOURS PRN
Status: DISCONTINUED | OUTPATIENT
Start: 2019-10-19 | End: 2019-10-26 | Stop reason: HOSPADM

## 2019-10-19 RX ORDER — FLUDROCORTISONE ACETATE 0.1 MG/1
0.2 TABLET ORAL EVERY MORNING
Status: DISCONTINUED | OUTPATIENT
Start: 2019-10-20 | End: 2019-10-26 | Stop reason: HOSPADM

## 2019-10-19 RX ORDER — DILTIAZEM HYDROCHLORIDE 5 MG/ML
20 INJECTION INTRAVENOUS ONCE
Status: COMPLETED | OUTPATIENT
Start: 2019-10-19 | End: 2019-10-19

## 2019-10-19 RX ORDER — FUROSEMIDE 10 MG/ML
20 INJECTION INTRAMUSCULAR; INTRAVENOUS ONCE
Status: DISPENSED | OUTPATIENT
Start: 2019-10-19 | End: 2019-10-20

## 2019-10-19 RX ORDER — PRAVASTATIN SODIUM 20 MG
20 TABLET ORAL DAILY
Status: DISCONTINUED | OUTPATIENT
Start: 2019-10-20 | End: 2019-10-26 | Stop reason: HOSPADM

## 2019-10-19 RX ORDER — PROMETHAZINE HYDROCHLORIDE 25 MG/1
12.5-25 SUPPOSITORY RECTAL EVERY 4 HOURS PRN
Status: DISCONTINUED | OUTPATIENT
Start: 2019-10-19 | End: 2019-10-26 | Stop reason: HOSPADM

## 2019-10-19 RX ORDER — BISACODYL 10 MG
10 SUPPOSITORY, RECTAL RECTAL
Status: DISCONTINUED | OUTPATIENT
Start: 2019-10-19 | End: 2019-10-26 | Stop reason: HOSPADM

## 2019-10-19 RX ORDER — FLUOXETINE HYDROCHLORIDE 20 MG/1
60 CAPSULE ORAL DAILY
Status: DISCONTINUED | OUTPATIENT
Start: 2019-10-20 | End: 2019-10-26 | Stop reason: HOSPADM

## 2019-10-19 RX ORDER — RISPERIDONE 1 MG/1
3 TABLET ORAL EVERY EVENING
Status: DISCONTINUED | OUTPATIENT
Start: 2019-10-19 | End: 2019-10-26 | Stop reason: HOSPADM

## 2019-10-19 RX ORDER — ASPIRIN 81 MG/1
81 TABLET, CHEWABLE ORAL DAILY
Status: DISCONTINUED | OUTPATIENT
Start: 2019-10-20 | End: 2019-10-26 | Stop reason: HOSPADM

## 2019-10-19 RX ORDER — ONDANSETRON 2 MG/ML
4 INJECTION INTRAMUSCULAR; INTRAVENOUS EVERY 4 HOURS PRN
Status: DISCONTINUED | OUTPATIENT
Start: 2019-10-19 | End: 2019-10-26 | Stop reason: HOSPADM

## 2019-10-19 RX ORDER — PROMETHAZINE HYDROCHLORIDE 25 MG/1
12.5-25 TABLET ORAL EVERY 4 HOURS PRN
Status: DISCONTINUED | OUTPATIENT
Start: 2019-10-19 | End: 2019-10-26 | Stop reason: HOSPADM

## 2019-10-19 RX ORDER — PYRIDOSTIGMINE BROMIDE 60 MG/1
120 TABLET ORAL 3 TIMES DAILY
Status: DISCONTINUED | OUTPATIENT
Start: 2019-10-19 | End: 2019-10-20

## 2019-10-19 RX ADMIN — METOPROLOL TARTRATE 25 MG: 25 TABLET, FILM COATED ORAL at 15:11

## 2019-10-19 RX ADMIN — RISPERIDONE 3 MG: 1 TABLET, FILM COATED ORAL at 17:56

## 2019-10-19 RX ADMIN — PYRIDOSTIGMINE BROMIDE 120 MG: 60 TABLET ORAL at 17:57

## 2019-10-19 RX ADMIN — PYRIDOSTIGMINE BROMIDE 120 MG: 60 TABLET ORAL at 14:20

## 2019-10-19 RX ADMIN — IOHEXOL 60 ML: 350 INJECTION, SOLUTION INTRAVENOUS at 10:26

## 2019-10-19 RX ADMIN — MIDODRINE HYDROCHLORIDE 10 MG: 5 TABLET ORAL at 13:26

## 2019-10-19 RX ADMIN — MIDODRINE HYDROCHLORIDE 10 MG: 5 TABLET ORAL at 17:56

## 2019-10-19 RX ADMIN — DILTIAZEM HYDROCHLORIDE 30 MG: 30 TABLET, FILM COATED ORAL at 13:26

## 2019-10-19 RX ADMIN — DILTIAZEM HYDROCHLORIDE 20 MG: 5 INJECTION INTRAVENOUS at 11:09

## 2019-10-19 RX ADMIN — Medication 1 G: at 17:56

## 2019-10-19 ASSESSMENT — LIFESTYLE VARIABLES
HAVE PEOPLE ANNOYED YOU BY CRITICIZING YOUR DRINKING: NO
TOTAL SCORE: 0
HAVE YOU EVER FELT YOU SHOULD CUT DOWN ON YOUR DRINKING: NO
TOTAL SCORE: 0
EVER_SMOKED: NEVER
ON A TYPICAL DAY WHEN YOU DRINK ALCOHOL HOW MANY DRINKS DO YOU HAVE: 0
AVERAGE NUMBER OF DAYS PER WEEK YOU HAVE A DRINK CONTAINING ALCOHOL: 0
HAVE YOU EVER FELT YOU SHOULD CUT DOWN ON YOUR DRINKING: NO
TOTAL SCORE: 0
DOES PATIENT WANT TO STOP DRINKING: NO
TOTAL SCORE: 0
HAVE PEOPLE ANNOYED YOU BY CRITICIZING YOUR DRINKING: NO
EVER FELT BAD OR GUILTY ABOUT YOUR DRINKING: NO
CONSUMPTION TOTAL: INCOMPLETE
TOTAL SCORE: 0
ALCOHOL_USE: NO
HOW MANY TIMES IN THE PAST YEAR HAVE YOU HAD 5 OR MORE DRINKS IN A DAY: 0
EVER HAD A DRINK FIRST THING IN THE MORNING TO STEADY YOUR NERVES TO GET RID OF A HANGOVER: NO
TOTAL SCORE: 0
EVER FELT BAD OR GUILTY ABOUT YOUR DRINKING: NO
CONSUMPTION TOTAL: NEGATIVE
ALCOHOL_USE: NO
EVER HAD A DRINK FIRST THING IN THE MORNING TO STEADY YOUR NERVES TO GET RID OF A HANGOVER: NO

## 2019-10-19 ASSESSMENT — COGNITIVE AND FUNCTIONAL STATUS - GENERAL
DAILY ACTIVITIY SCORE: 20
MOVING FROM LYING ON BACK TO SITTING ON SIDE OF FLAT BED: A LITTLE
MOVING TO AND FROM BED TO CHAIR: A LITTLE
MOBILITY SCORE: 18
CLIMB 3 TO 5 STEPS WITH RAILING: A LOT
TOILETING: A LITTLE
DRESSING REGULAR LOWER BODY CLOTHING: A LITTLE
SUGGESTED CMS G CODE MODIFIER MOBILITY: CK
WALKING IN HOSPITAL ROOM: A LITTLE
STANDING UP FROM CHAIR USING ARMS: A LITTLE
SUGGESTED CMS G CODE MODIFIER DAILY ACTIVITY: CJ
HELP NEEDED FOR BATHING: A LOT

## 2019-10-19 ASSESSMENT — COPD QUESTIONNAIRES
DURING THE PAST 4 WEEKS HOW MUCH DID YOU FEEL SHORT OF BREATH: NONE/LITTLE OF THE TIME
IN THE PAST 12 MONTHS DO YOU DO LESS THAN YOU USED TO BECAUSE OF YOUR BREATHING PROBLEMS: DISAGREE/UNSURE
DO YOU EVER COUGH UP ANY MUCUS OR PHLEGM?: NO/ONLY WITH OCCASIONAL COLDS OR INFECTIONS
HAVE YOU SMOKED AT LEAST 100 CIGARETTES IN YOUR ENTIRE LIFE: NO/DON'T KNOW
COPD SCREENING SCORE: 1

## 2019-10-19 NOTE — CONSULTS
Cardiology Consult Note    DOS: 10/19/2019    Consulting physician: Amanda Sosa    Chief complaint/Reason for consult: AF    HPI:  Patient is a 55 yo M. He has a history of orthostasis and prior syncope due to this along with persistent AF. In the past has had HF symptoms with EF mild to moderately depressed around 40%. Subsequently with rhythm control had normalized. I had seen him previously for orthostatic syncope at the request of neurology who thought that amiodarone may have been contributing. At that time we had suggested stopping amiodarone and instead trying an alternative antiarrhythmic with elective AF ablation being the plan. Since that time he was lost to follow-up for cardiology. He has had more syncopal spells despite the pyridostigmine, florinef, and midodrine. He came in with another syncopal spell after getting up. Found to be in AF with slight RVR here, rates better with IV dilt. Still on quarter dose amiodarone. Cardiology asked to comment on AF regimen.    ROS (+ highlighted in BOLD):  Constitutional: Fevers/chills/fatigue/weightloss  HEENT: Blurry vision/eye pain/sore throat/hearing loss  Respiratory: Shortness of breath/cough  Cardiovascular: Chest pain/palpitations/edema/orthopnea/syncope  GI: Nausea/vomitting/diarrhea  MSK: Arthralgias/myagias/muscle weakness  Skin: Rash/sores  Neurological: Numbness/tremors/vertigo  Endocrine: Excessive thirst/polyuria/cold intolerance/heat intolerance  Psych: Depression/anxiety    Past Medical History:   Diagnosis Date   • A-fib (HCC)    • Chest pain    • Congestive heart failure (HCC)    • Diabetes (HCC)    • Diabetic neuropathy (HCC)    • Hypercholesterolemia    • Hypertension    • Morbid obesity (HCC)    • Noncompliance    • Normal cardiac stress test    • Orthostatic hypotension        Past Surgical History:   Procedure Laterality Date   • ZZZ CARDIAC CATH  07/21/2018    EF 45%, normal coronaries.   • IRRIGATION & DEBRIDEMENT ORTHO Right 2/19/2018     Procedure: IRRIGATION & DEBRIDEMENT ORTHO-FOOT;  Surgeon: Kirby Lopez M.D.;  Location: SURGERY [facility];  Service: Orthopedics   • TOE AMPUTATION Right 1/17/2018    Procedure: TOE AMPUTATION-1ST RAY;  Surgeon: Doug Delong M.D.;  Location: SURGERY [facility];  Service: Orthopedics   • TOE AMPUTATION Right 11/10/2017    Procedure: TOE AMPUTATION;  Surgeon: Osorio Leo M.D.;  Location: SURGERY [facility];  Service: Orthopedics       Social History     Socioeconomic History   • Marital status:      Spouse name: Not on file   • Number of children: Not on file   • Years of education: Not on file   • Highest education level: Not on file   Occupational History   • Not on file   Social Needs   • Financial resource strain: Not on file   • Food insecurity:     Worry: Not on file     Inability: Not on file   • Transportation needs:     Medical: Not on file     Non-medical: Not on file   Tobacco Use   • Smoking status: Never Smoker   • Smokeless tobacco: Never Used   Substance and Sexual Activity   • Alcohol use: No   • Drug use: No   • Sexual activity: Not on file   Lifestyle   • Physical activity:     Days per week: Not on file     Minutes per session: Not on file   • Stress: Not on file   Relationships   • Social connections:     Talks on phone: Not on file     Gets together: Not on file     Attends Faith service: Not on file     Active member of club or organization: Not on file     Attends meetings of clubs or organizations: Not on file     Relationship status: Not on file   • Intimate partner violence:     Fear of current or ex partner: Not on file     Emotionally abused: Not on file     Physically abused: Not on file     Forced sexual activity: Not on file   Other Topics Concern   • Not on file   Social History Narrative   • Not on file       Family History   Problem Relation Age of Onset   • No Known Problems Mother    • No Known Problems Father        Allergies   Allergen  Reactions   • Daptomycin Rash     Body rash  RXN=1/2018     • Pcn [Penicillins] Hives and Rash      Rash & hives  RXN=since a kid   • Unasyn [Ampicillin-Sulbactam Sodium] Hives, Itching and Swelling   • Vancomycin Rash     Red man syndrome       Current Facility-Administered Medications   Medication Dose Route Frequency Provider Last Rate Last Dose   • furosemide (LASIX) injection 20 mg  20 mg Intravenous Once Amanda Sosa D.O.   Stopped at 10/19/19 1110   • [START ON 10/20/2019] aspirin (ASA) chewable tab 81 mg  81 mg Oral DAILY Jose An M.D.       • [START ON 10/20/2019] cyanocobalamin (VITAMIN B-12) tablet 1,000 mcg  1,000 mcg Oral DAILY Jose An M.D.       • [START ON 10/20/2019] fludrocortisone (FLORINEF) tablet 0.2 mg  0.2 mg Oral QAM Jose An M.D.       • [START ON 10/20/2019] FLUoxetine (PROZAC) capsule 60 mg  60 mg Oral DAILY Jose An M.D.       • [START ON 10/20/2019] magnesium oxide (MAG-OX) tablet 400 mg  400 mg Oral DAILY Jose An M.D.       • midodrine (PROAMATINE) tablet 10 mg  10 mg Oral TID Jose An M.D.       • [START ON 10/20/2019] pravastatin (PRAVACHOL) tablet 20 mg  20 mg Oral DAILY Jose An M.D.       • pyridostigmine (MESTINON) tablet 120 mg  120 mg Oral TID Jose An M.D.       • risperiDONE (RISPERDAL) tablet 3 mg  3 mg Oral Q EVENING Jose An M.D.       • sodium chloride (SALT) tablet 1 g  1 g Oral TID WITH MEALS Jose An M.D.       • senna-docusate (PERICOLACE or SENOKOT S) 8.6-50 MG per tablet 2 Tab  2 Tab Oral BID Jose An M.D.        And   • polyethylene glycol/lytes (MIRALAX) PACKET 1 Packet  1 Packet Oral QDAY PRN Jose An M.D.        And   • magnesium hydroxide (MILK OF MAGNESIA) suspension 30 mL  30 mL Oral QDAY PRN Jose An M.D.        And   • bisacodyl (DULCOLAX) suppository 10 mg  10 mg Rectal QDAY PRN Jose Gonzalez  JEFFREY An       • [START ON 10/20/2019] enoxaparin (LOVENOX) inj 40 mg  40 mg Subcutaneous DAILY Jose An M.D.       • acetaminophen (TYLENOL) tablet 650 mg  650 mg Oral Q6HRS PRN Jose An M.D.       • ondansetron (ZOFRAN) syringe/vial injection 4 mg  4 mg Intravenous Q4HRS PRN Jose An M.D.       • ondansetron (ZOFRAN ODT) dispertab 4 mg  4 mg Oral Q4HRS PRN Jose An M.D.       • promethazine (PHENERGAN) tablet 12.5-25 mg  12.5-25 mg Oral Q4HRS PRN Jose An M.D.       • promethazine (PHENERGAN) suppository 12.5-25 mg  12.5-25 mg Rectal Q4HRS PRN Jose An M.D.       • prochlorperazine (COMPAZINE) injection 5-10 mg  5-10 mg Intravenous Q4HRS PRN Jose An M.D.       • metoprolol (LOPRESSOR) tablet 25 mg  25 mg Oral Q8HRS Jose An M.D.       • DILTIAZem (CARDIZEM) tablet 30 mg  30 mg Oral Once Amanda Sosa D.O.         Current Outpatient Medications   Medication Sig Dispense Refill   • aspirin (ASA) 81 MG Chew Tab chewable tablet Take 1 Tab by mouth every day. 100 Tab 0   • amiodarone (PACERONE) 100 MG tablet Take 0.5 Tabs by mouth every day. 30 Tab 0   • FLUoxetine (PROZAC) 20 MG Cap Take 3 Caps by mouth every day. 90 Cap 0   • pravastatin (PRAVACHOL) 20 MG Tab Take 1 Tab by mouth every day. 90 Tab 3   • risperiDONE (RISPERDAL) 3 MG Tab Take 1 Tab by mouth every evening. 60 Tab 0   • fludrocortisone (FLORINEF) 0.1 MG Tab Take 2 Tabs by mouth every morning. 30 Tab 0   • furosemide (LASIX) 20 MG Tab Take 0.5 Tabs by mouth 1 time daily as needed (Edema, Shortness of breath or weight gain.). 30 Tab 0   • cyanocobalamin (VITAMIN B12) 1000 MCG Tab Take 1 Tab by mouth every day. 30 Tab 0   • magnesium oxide (MAG-OX) 400 (241.3 Mg) MG Tab tablet Take 1 Tab by mouth every day. 30 Tab 0   • midodrine (PROAMATINE) 10 MG tablet Take 1 Tab by mouth 3 times a day. 60 Tab 0   • pyridostigmine (MESTINON) 60 MG Tab Take 2 Tabs by  "mouth 3 times a day. 90 Tab 3   • sodium chloride (SALT) 1 GM Tab Take 1 Tab by mouth 3 times a day, with meals. 90 Tab 0       Physical Exam:  Vitals:    10/19/19 1159 10/19/19 1200 10/19/19 1225 10/19/19 1238   BP:  130/92 135/76    Pulse: 85  90    Resp: 20  18    Temp:   36.9 °C (98.4 °F)    TempSrc:   Temporal    SpO2: 98%  99%    Weight:   (!) 136.1 kg (300 lb 0.7 oz) (!) 136.1 kg (300 lb 0.7 oz)   Height:   1.829 m (6' 0.01\")      General appearance: NAD, conversant, morbidly obese  Eyes: anicteric sclerae, moist conjunctivae; no lid-lag; PERRLA  HENT: Atraumatic; oropharynx clear with moist mucous membranes and no mucosal ulcerations; normal hard and soft palate  Neck: Trachea midline; FROM, supple, no thyromegaly or lymphadenopathy  Lungs: CTA, with normal respiratory effort and no intercostal retractions  CV: irregularly irregular, no MRGs, no JVD   Abdomen: Soft, non-tender; no masses or HSM  Extremities: Trace bilateral peripheral edema, WWP  Skin: Normal temperature, turgor and texture; no rash, ulcers or subcutaneous nodules  Psych: Appropriate affect, alert and oriented to person, place and time    Data:  Labs reviewed    Prior echo/stress results reviewed:  Last echo LVEF 60%    EKG interpreted by me:   AF    Impression/Plan:  1)Persistent AF  2)Orthostatic intolerance/syncope  3)Amiodarone therapy  4)History of systolic CHF  5)Depression    -Given frequent falls due to refractory orthostasis he was deemed not a candidate for anticoagulation  -If not able to take anticoagulation I do not think he is a candidate for rhythm control  -We can stick with rate control with sanket agents as tolerated (BB reasonable)  -Amiodarone not working at this dose anyways and I would just discontinue it  -He does not seem excessively volume up on exam, and given his orthostasis, I would not diurese him too aggressively  -In terms of his AF not much else to do if frequent falls preclude OAC for him  -Primary service to " titrate sanket agents for rate control  -Cardiology to sign off    Zelalem Black MD

## 2019-10-19 NOTE — CARE PLAN
Problem: Communication  Goal: The ability to communicate needs accurately and effectively will improve  Outcome: PROGRESSING AS EXPECTED     Problem: Safety  Goal: Will remain free from injury  Outcome: PROGRESSING AS EXPECTED     Problem: Knowledge Deficit  Goal: Knowledge of disease process/condition, treatment plan, diagnostic tests, and medications will improve  Outcome: PROGRESSING AS EXPECTED  Goal: Knowledge of the prescribed therapeutic regimen will improve  Outcome: PROGRESSING AS EXPECTED

## 2019-10-19 NOTE — PROGRESS NOTES
Pt transferred to floor from ER due to syncopal episode with ground level fall.  Pt lives at [facility] facility who are currently managing his mental health.  Pt is alert and oriented x4.  Denies pain, CP or SOB.  Pt in A-Fib per tele.

## 2019-10-19 NOTE — ED PROVIDER NOTES
ED Provider Note    CHIEF COMPLAINT  Chief Complaint   Patient presents with   • Syncope   • Chest Pain       HPI  Sebastián Castro is a 54 y.o. male who presents to the emergency department by ambulance following a syncopal episode.  Patient describes history of recurrent syncope, orthostatic hypotension for which she is compliant with medications.  Also with history of CHF, atrial fibrillation, diabetes, hypertension.  Patient states that he was taken off of Xarelto sometime this year.    Describes 3 to 4 days of increasing dyspnea, worse with exertion.  No significant increase in lower extremity edema.  Intermittent chest discomfort, pressure, worse with shortness of breath.  The patient was at home this morning, when he felt lightheaded and passed out.  Denies headache, altered mental status, focal weakness or paresthesia.  Denies recent illness, cough, congestion or fever.    REVIEW OF SYSTEMS  See HPI for further details. All other systems are negative.     PAST MEDICAL HISTORY   has a past medical history of A-fib (HCC), Chest pain, Congestive heart failure (HCC), Diabetes (HCC), Diabetic neuropathy (HCC), Hypercholesterolemia, Hypertension, Morbid obesity (HCC), Noncompliance, Normal cardiac stress test, and Orthostatic hypotension.    SOCIAL HISTORY  Social History     Tobacco Use   • Smoking status: Never Smoker   • Smokeless tobacco: Never Used   Substance and Sexual Activity   • Alcohol use: No   • Drug use: No   • Sexual activity: Not on file       SURGICAL HISTORY   has a past surgical history that includes toe amputation (Right, 11/10/2017); toe amputation (Right, 1/17/2018); irrigation & debridement ortho (Right, 2/19/2018); and zzz cardiac cath (07/21/2018).    CURRENT MEDICATIONS  Home Medications     Reviewed by Maria Fernanda Heart, Pharmacy Intern (Pharmacy Intern) on 10/19/19 at 1111  Med List Status: Complete   Medication Last Dose Status   amiodarone (PACERONE) 100 MG tablet 10/19/2019 Active    aspirin (ASA) 81 MG Chew Tab chewable tablet 10/19/2019 Active   cyanocobalamin (VITAMIN B12) 1000 MCG Tab 10/19/2019 Active   fludrocortisone (FLORINEF) 0.1 MG Tab 10/19/2019 Active   FLUoxetine (PROZAC) 20 MG Cap 10/19/2019 Active   furosemide (LASIX) 20 MG Tab PRN Active   magnesium oxide (MAG-OX) 400 (241.3 Mg) MG Tab tablet 10/19/2019 Active   midodrine (PROAMATINE) 10 MG tablet 10/19/2019 Active   pravastatin (PRAVACHOL) 20 MG Tab 10/19/2019 Active   pyridostigmine (MESTINON) 60 MG Tab 10/19/2019 Active   risperiDONE (RISPERDAL) 3 MG Tab 10/18/2019 Active   sodium chloride (SALT) 1 GM Tab 10/19/2019 Active                ALLERGIES  Allergies   Allergen Reactions   • Daptomycin Rash     Body rash  RXN=1/2018     • Pcn [Penicillins] Hives and Rash      Rash & hives  RXN=since a kid   • Unasyn [Ampicillin-Sulbactam Sodium] Hives, Itching and Swelling   • Vancomycin Rash     Red man syndrome       PHYSICAL EXAM  VITAL SIGNS: /86   Pulse 76   Temp 36.6 °C (97.9 °F) (Temporal)   Resp 16   Ht 1.829 m (6')   Wt (!) 129.3 kg (285 lb)   SpO2 99%   BMI 38.65 kg/m²   Pulse ox interpretation: I interpret this pulse ox as normal.  Constitutional: Alert in no apparent distress.  Morbidly obese  HENT: Normocephalic, atraumatic. Bilateral external ears normal, Nose normal. Moist mucous membranes.    Eyes: Pupils are equal and reactive, Conjunctiva normal.   Neck: Normal range of motion, Supple  Lymphatic: No lymphadenopathy noted.   Cardiovascular: Irregularly irregular, mildly tachycardic., no murmurs. Distal pulses intact.  Trace lower extremity edema  Thorax & Lungs: Bibasilar rales, no wheezes or rhonchi.  Otherwise without increased work of breathing, clear speech retractions.  Abdomen: Obese, soft, non-distended, non-tender to palpation. No palpable or pulsatile masses. No peritoneal signs.   Skin: Warm, Dry, No erythema, No rash.   Musculoskeletal: Good range of motion in all major joints.   Neurologic:  Alert and oriented x4.  Moves 4 extremities spontaneously.  Psychiatric: Affect normal, Judgment normal, Mood normal.       DIAGNOSTIC STUDIES / PROCEDURES    LABS  .  Results for orders placed or performed during the hospital encounter of 10/19/19   CBC with Differential   Result Value Ref Range    WBC 7.9 4.8 - 10.8 K/uL    RBC 5.93 4.70 - 6.10 M/uL    Hemoglobin 16.9 14.0 - 18.0 g/dL    Hematocrit 51.1 42.0 - 52.0 %    MCV 86.2 81.4 - 97.8 fL    MCH 28.5 27.0 - 33.0 pg    MCHC 33.1 (L) 33.7 - 35.3 g/dL    RDW 39.6 35.9 - 50.0 fL    Platelet Count 186 164 - 446 K/uL    MPV 10.7 9.0 - 12.9 fL    Neutrophils-Polys 58.70 44.00 - 72.00 %    Lymphocytes 30.40 22.00 - 41.00 %    Monocytes 6.70 0.00 - 13.40 %    Eosinophils 2.90 0.00 - 6.90 %    Basophils 0.50 0.00 - 1.80 %    Immature Granulocytes 0.80 0.00 - 0.90 %    Nucleated RBC 0.00 /100 WBC    Neutrophils (Absolute) 4.65 1.82 - 7.42 K/uL    Lymphs (Absolute) 2.41 1.00 - 4.80 K/uL    Monos (Absolute) 0.53 0.00 - 0.85 K/uL    Eos (Absolute) 0.23 0.00 - 0.51 K/uL    Baso (Absolute) 0.04 0.00 - 0.12 K/uL    Immature Granulocytes (abs) 0.06 0.00 - 0.11 K/uL    NRBC (Absolute) 0.00 K/uL   Complete Metabolic Panel (CMP)   Result Value Ref Range    Sodium 135 135 - 145 mmol/L    Potassium 4.2 3.6 - 5.5 mmol/L    Chloride 98 96 - 112 mmol/L    Co2 27 20 - 33 mmol/L    Anion Gap 10.0 0.0 - 11.9    Glucose 161 (H) 65 - 99 mg/dL    Bun 40 (H) 8 - 22 mg/dL    Creatinine 1.25 0.50 - 1.40 mg/dL    Calcium 10.1 8.5 - 10.5 mg/dL    AST(SGOT) 31 12 - 45 U/L    ALT(SGPT) 29 2 - 50 U/L    Alkaline Phosphatase 87 30 - 99 U/L    Total Bilirubin 0.7 0.1 - 1.5 mg/dL    Albumin 4.1 3.2 - 4.9 g/dL    Total Protein 8.4 (H) 6.0 - 8.2 g/dL    Globulin 4.3 (H) 1.9 - 3.5 g/dL    A-G Ratio 1.0 g/dL   Troponin   Result Value Ref Range    Troponin T 37 (H) 6 - 19 ng/L   APTT   Result Value Ref Range    APTT 27.7 24.7 - 36.0 sec   PT/INR   Result Value Ref Range    PT 13.1 12.0 - 14.6 sec    INR  0.97 0.87 - 1.13   D-Dimer (only helpful in low pre-test probability wells critieria. Do not order if patient ruled out by PERC criteria. See Weblinks at top of Labs section)   Result Value Ref Range    D-Dimer Screen 2.16 (H) 0.00 - 0.50 ug/mL (FEU)   proBrain Natriuretic Peptide, NT   Result Value Ref Range    NT-proBNP 1451 (H) 0 - 125 pg/mL   MAGNESIUM   Result Value Ref Range    Magnesium 2.0 1.5 - 2.5 mg/dL   ESTIMATED GFR   Result Value Ref Range    GFR If African American >60 >60 mL/min/1.73 m 2    GFR If Non African American 60 >60 mL/min/1.73 m 2   TROPONIN   Result Value Ref Range    Troponin T 39 (H) 6 - 19 ng/L   EKG (NOW)   Result Value Ref Range    Report       [facility] Emergency Dept.    Test Date:  2019-10-19  Pt Name:    EMELY FLORES                Department: ER  MRN:        [id number]                      Room:        03  Gender:     Male                         Technician: [id number]  :        1965                   Requested By:ER TRIAGE PROTOCOL  Order #:    [id number]                    Reading MD: BACILIO ARAGON DO    Measurements  Intervals                                Axis  Rate:       106                          P:  MT:                                      QRS:        -52  QRSD:       96                           T:          64  QT:         388  QTc:        516    Interpretive Statements  ATRIAL FIBRILLATION, V-RATE    LEFT ANTERIOR FASCICULAR BLOCK  BORDERLINE LOW VOLTAGE IN FRONTAL LEADS  LATE PRECORDIAL R/S TRANSITION  PROLONGED QT INTERVAL  Compared to ECG 2018 20:01:08  Left anterior fascicular block now present  Prolonged QT interval now present  Sinus rhythm no longer present    E lectronically Signed On 10- 10:06:47 PDT by BACILIO ARAGON DO         RADIOLOGY  CT-CTA CHEST PULMONARY ARTERY W/ RECONS   Final Result      1.  No CT evidence for pulmonary emboli.   2.  No pneumonia or pneumothorax.   3.  Minimal bibasilar  atelectasis.            DX-CHEST-PORTABLE (1 VIEW)   Final Result      1.  Mild pulmonary vascular congestion.   2.  No lobar pneumonia or overt pulmonary edema.          COURSE & MEDICAL DECISION MAKING  Nursing notes and vital signs were reviewed. (See chart for details)  The patients records were reviewed, history was obtained from the patient;     Medical record review: Cardiac cath 12/2018- for CAD, normal EF.  Notes indicating discontinuation of Xarelto in 3/2019 due to fall risk.  EKGs 12/2018 normal sinus rhythm, 10/2018 atrial fibrillation, rapid ventricular rate.    Patient in atrial fibrillation with rapid ventricular rate, although mild tachycardia, 98-1 15.  Dyspnea progressive over 3 to 4 days, worse with exertion.  Elevated d-dimer.  Chest x-ray pulmonary edema.  Will add CT to exclude pulmonary embolism.  Elevated troponin, repeat pending as well.    CT without evidence for PE, pneumonia or significant effusion.  Further review of medical records indicates patient's increased symptomatology, syncope with A. fib RVR.  This may also be contributing to the CHF exacerbation/early pulmonary edema.  Patient only takes amiodarone at home.  Rate controlled after single dose of Cardizem IV, small oral load is been given as well.  Lasix ordered if blood pressure allows.  Labs fairly normal, although BNP 1451, troponin elevated at 37, repeat 39.  Cannot exclude ACS although this may be leak from the CHF.  Previously normal cardiac cath.  No acute respiratory distress.  Hemodynamically stable without fever or hypotension.  Never hypoxic.  He will be admitted to the hospital for further evaluation, treatment, diuresis and rate control.  Patient is aware the findings and agreeable to the disposition plan.    11:12 AM Dr. Artis is where the patient agreeable to admission.  Request cardiology consultation.    11:31 AM Dr. Black is aware of the patient agreeable to consultation.  No further recommendations at this  time as patient is rate controlled, responded to the Cardizem and mostly asymptomatic.      FINAL IMPRESSION  (I48.91) Atrial fibrillation with rapid ventricular response (HCC)  (I50.9) Acute on chronic congestive heart failure, unspecified heart failure type (HCC)  (R55) Syncope, unspecified syncope type      Electronically signed by: Amanda Sosa, 10/19/2019 10:02 AM      This dictation was created using voice recognition software. The accuracy of the dictation is limited to the abilities of the software. I expect there may be some errors of grammar and possibly content. The nursing notes were reviewed and certain aspects of this information were incorporated into this note.

## 2019-10-19 NOTE — ED TRIAGE NOTES
BIB [facility] to R3 w/ c/o chest pain & syncopal episode this a.m.  Reports hx of orthostatic hypotension w/ previous syncopal episodes.  Pt arrives A&O, reports substernal chest pain, 5/10, radiating to his back, reports mild pain to posterior head after striking it after syncopal episode.  IV established, labs drawn & sent.  Chart up for ERP.

## 2019-10-20 PROBLEM — Z79.899 POLYPHARMACY: Status: ACTIVE | Noted: 2019-10-20

## 2019-10-20 LAB
ANION GAP SERPL CALC-SCNC: 11 MMOL/L (ref 0–11.9)
BASOPHILS # BLD AUTO: 0.6 % (ref 0–1.8)
BASOPHILS # BLD: 0.05 K/UL (ref 0–0.12)
BUN SERPL-MCNC: 35 MG/DL (ref 8–22)
CALCIUM SERPL-MCNC: 9.7 MG/DL (ref 8.5–10.5)
CHLORIDE SERPL-SCNC: 98 MMOL/L (ref 96–112)
CO2 SERPL-SCNC: 27 MMOL/L (ref 20–33)
CREAT SERPL-MCNC: 1.04 MG/DL (ref 0.5–1.4)
EOSINOPHIL # BLD AUTO: 0.25 K/UL (ref 0–0.51)
EOSINOPHIL NFR BLD: 2.8 % (ref 0–6.9)
ERYTHROCYTE [DISTWIDTH] IN BLOOD BY AUTOMATED COUNT: 39.9 FL (ref 35.9–50)
GLUCOSE SERPL-MCNC: 148 MG/DL (ref 65–99)
HCT VFR BLD AUTO: 50.4 % (ref 42–52)
HGB BLD-MCNC: 16.1 G/DL (ref 14–18)
IMM GRANULOCYTES # BLD AUTO: 0.04 K/UL (ref 0–0.11)
IMM GRANULOCYTES NFR BLD AUTO: 0.5 % (ref 0–0.9)
LYMPHOCYTES # BLD AUTO: 1.94 K/UL (ref 1–4.8)
LYMPHOCYTES NFR BLD: 22.1 % (ref 22–41)
MCH RBC QN AUTO: 27.6 PG (ref 27–33)
MCHC RBC AUTO-ENTMCNC: 31.9 G/DL (ref 33.7–35.3)
MCV RBC AUTO: 86.3 FL (ref 81.4–97.8)
MONOCYTES # BLD AUTO: 0.65 K/UL (ref 0–0.85)
MONOCYTES NFR BLD AUTO: 7.4 % (ref 0–13.4)
NEUTROPHILS # BLD AUTO: 5.85 K/UL (ref 1.82–7.42)
NEUTROPHILS NFR BLD: 66.6 % (ref 44–72)
NRBC # BLD AUTO: 0 K/UL
NRBC BLD-RTO: 0 /100 WBC
PLATELET # BLD AUTO: 179 K/UL (ref 164–446)
PMV BLD AUTO: 11.4 FL (ref 9–12.9)
POTASSIUM SERPL-SCNC: 3.6 MMOL/L (ref 3.6–5.5)
RBC # BLD AUTO: 5.84 M/UL (ref 4.7–6.1)
SODIUM SERPL-SCNC: 136 MMOL/L (ref 135–145)
WBC # BLD AUTO: 8.8 K/UL (ref 4.8–10.8)

## 2019-10-20 PROCEDURE — 36415 COLL VENOUS BLD VENIPUNCTURE: CPT

## 2019-10-20 PROCEDURE — 85025 COMPLETE CBC W/AUTO DIFF WBC: CPT

## 2019-10-20 PROCEDURE — 90471 IMMUNIZATION ADMIN: CPT

## 2019-10-20 PROCEDURE — 90686 IIV4 VACC NO PRSV 0.5 ML IM: CPT | Performed by: HOSPITALIST

## 2019-10-20 PROCEDURE — 700102 HCHG RX REV CODE 250 W/ 637 OVERRIDE(OP): Performed by: STUDENT IN AN ORGANIZED HEALTH CARE EDUCATION/TRAINING PROGRAM

## 2019-10-20 PROCEDURE — 99226 PR SUBSEQUENT OBSERVATION CARE,LEVEL III: CPT | Performed by: STUDENT IN AN ORGANIZED HEALTH CARE EDUCATION/TRAINING PROGRAM

## 2019-10-20 PROCEDURE — 80048 BASIC METABOLIC PNL TOTAL CA: CPT

## 2019-10-20 PROCEDURE — 96372 THER/PROPH/DIAG INJ SC/IM: CPT

## 2019-10-20 PROCEDURE — 700111 HCHG RX REV CODE 636 W/ 250 OVERRIDE (IP): Performed by: STUDENT IN AN ORGANIZED HEALTH CARE EDUCATION/TRAINING PROGRAM

## 2019-10-20 PROCEDURE — G0378 HOSPITAL OBSERVATION PER HR: HCPCS

## 2019-10-20 PROCEDURE — 700111 HCHG RX REV CODE 636 W/ 250 OVERRIDE (IP): Performed by: HOSPITALIST

## 2019-10-20 PROCEDURE — 700102 HCHG RX REV CODE 250 W/ 637 OVERRIDE(OP): Performed by: HOSPITALIST

## 2019-10-20 PROCEDURE — 96376 TX/PRO/DX INJ SAME DRUG ADON: CPT

## 2019-10-20 PROCEDURE — A9270 NON-COVERED ITEM OR SERVICE: HCPCS | Performed by: HOSPITALIST

## 2019-10-20 PROCEDURE — A9270 NON-COVERED ITEM OR SERVICE: HCPCS | Performed by: STUDENT IN AN ORGANIZED HEALTH CARE EDUCATION/TRAINING PROGRAM

## 2019-10-20 RX ORDER — FUROSEMIDE 10 MG/ML
20 INJECTION INTRAMUSCULAR; INTRAVENOUS ONCE
Status: COMPLETED | OUTPATIENT
Start: 2019-10-20 | End: 2019-10-20

## 2019-10-20 RX ORDER — PYRIDOSTIGMINE BROMIDE 60 MG/1
90 TABLET ORAL 3 TIMES DAILY
Status: DISCONTINUED | OUTPATIENT
Start: 2019-10-20 | End: 2019-10-26 | Stop reason: HOSPADM

## 2019-10-20 RX ORDER — NYSTATIN 100000 [USP'U]/G
POWDER TOPICAL 2 TIMES DAILY
Status: DISCONTINUED | OUTPATIENT
Start: 2019-10-20 | End: 2019-10-26 | Stop reason: HOSPADM

## 2019-10-20 RX ORDER — FUROSEMIDE 10 MG/ML
40 INJECTION INTRAMUSCULAR; INTRAVENOUS
Status: DISCONTINUED | OUTPATIENT
Start: 2019-10-21 | End: 2019-10-22

## 2019-10-20 RX ORDER — FUROSEMIDE 10 MG/ML
20 INJECTION INTRAMUSCULAR; INTRAVENOUS
Status: DISCONTINUED | OUTPATIENT
Start: 2019-10-20 | End: 2019-10-20

## 2019-10-20 RX ORDER — METOPROLOL TARTRATE 50 MG/1
100 TABLET, FILM COATED ORAL TWICE DAILY
Status: DISCONTINUED | OUTPATIENT
Start: 2019-10-20 | End: 2019-10-20

## 2019-10-20 RX ADMIN — FLUDROCORTISONE ACETATE 0.2 MG: 0.1 TABLET ORAL at 05:19

## 2019-10-20 RX ADMIN — FLUOXETINE HYDROCHLORIDE 60 MG: 20 CAPSULE ORAL at 05:19

## 2019-10-20 RX ADMIN — CYANOCOBALAMIN TAB 500 MCG 1000 MCG: 500 TAB at 05:20

## 2019-10-20 RX ADMIN — PYRIDOSTIGMINE BROMIDE 120 MG: 60 TABLET ORAL at 11:39

## 2019-10-20 RX ADMIN — FUROSEMIDE 20 MG: 10 INJECTION, SOLUTION INTRAMUSCULAR; INTRAVENOUS at 16:30

## 2019-10-20 RX ADMIN — METOPROLOL TARTRATE 25 MG: 25 TABLET, FILM COATED ORAL at 09:13

## 2019-10-20 RX ADMIN — MAGNESIUM GLUCONATE 500 MG ORAL TABLET 400 MG: 500 TABLET ORAL at 05:19

## 2019-10-20 RX ADMIN — INFLUENZA A VIRUS A/BRISBANE/02/2018 IVR-190 (H1N1) ANTIGEN (FORMALDEHYDE INACTIVATED), INFLUENZA A VIRUS A/KANSAS/14/2017 X-327 (H3N2) ANTIGEN (FORMALDEHYDE INACTIVATED), INFLUENZA B VIRUS B/PHUKET/3073/2013 ANTIGEN (FORMALDEHYDE INACTIVATED), AND INFLUENZA B VIRUS B/MARYLAND/15/2016 BX-69A ANTIGEN (FORMALDEHYDE INACTIVATED) 0.5 ML: 15; 15; 15; 15 INJECTION, SUSPENSION INTRAMUSCULAR at 05:20

## 2019-10-20 RX ADMIN — NYSTATIN: 100000 POWDER TOPICAL at 18:03

## 2019-10-20 RX ADMIN — MIDODRINE HYDROCHLORIDE 10 MG: 5 TABLET ORAL at 17:16

## 2019-10-20 RX ADMIN — RISPERIDONE 3 MG: 1 TABLET, FILM COATED ORAL at 17:17

## 2019-10-20 RX ADMIN — Medication 1 G: at 17:16

## 2019-10-20 RX ADMIN — MIDODRINE HYDROCHLORIDE 10 MG: 5 TABLET ORAL at 05:19

## 2019-10-20 RX ADMIN — ASPIRIN 81 MG 81 MG: 81 TABLET ORAL at 05:20

## 2019-10-20 RX ADMIN — PYRIDOSTIGMINE BROMIDE 90 MG: 60 TABLET ORAL at 17:18

## 2019-10-20 RX ADMIN — PYRIDOSTIGMINE BROMIDE 120 MG: 60 TABLET ORAL at 05:20

## 2019-10-20 RX ADMIN — ENOXAPARIN SODIUM 40 MG: 100 INJECTION SUBCUTANEOUS at 05:20

## 2019-10-20 RX ADMIN — NYSTATIN: 100000 POWDER TOPICAL at 11:38

## 2019-10-20 RX ADMIN — PRAVASTATIN SODIUM 20 MG: 20 TABLET ORAL at 05:20

## 2019-10-20 RX ADMIN — Medication 1 G: at 11:38

## 2019-10-20 RX ADMIN — MIDODRINE HYDROCHLORIDE 10 MG: 5 TABLET ORAL at 11:38

## 2019-10-20 RX ADMIN — Medication 1 G: at 09:13

## 2019-10-20 RX ADMIN — FUROSEMIDE 20 MG: 10 INJECTION, SOLUTION INTRAMUSCULAR; INTRAVENOUS at 13:59

## 2019-10-20 ASSESSMENT — ENCOUNTER SYMPTOMS
NERVOUS/ANXIOUS: 0
DEPRESSION: 0
FEVER: 0

## 2019-10-20 NOTE — CARE PLAN
Problem: Communication  Goal: The ability to communicate needs accurately and effectively will improve  Outcome: PROGRESSING AS EXPECTED     Problem: Safety  Goal: Will remain free from injury  Outcome: PROGRESSING AS EXPECTED  Goal: Will remain free from falls  Outcome: PROGRESSING AS EXPECTED     Problem: Knowledge Deficit  Goal: Knowledge of disease process/condition, treatment plan, diagnostic tests, and medications will improve  Outcome: PROGRESSING AS EXPECTED  Goal: Knowledge of the prescribed therapeutic regimen will improve  Outcome: PROGRESSING AS EXPECTED     Problem: Respiratory:  Goal: Respiratory status will improve  Outcome: PROGRESSING AS EXPECTED     Problem: Mobility  Goal: Risk for activity intolerance will decrease  Outcome: PROGRESSING AS EXPECTED

## 2019-10-20 NOTE — PROGRESS NOTES
Call from monitor pt had 3 pauses in row 2.1, 2.2, 2.3.   Pt is resting in bed.  Denies CP, palpitations or SOB.  Oncoming RN aware and will monitor

## 2019-10-20 NOTE — PROGRESS NOTES
Handoff report received from night shift nurse. Pt care assumed. Pt is currently resting in bed. POC discussed with Pt and Pt verbalizes no questions at this time. Pt is AAOx4, on Ra, on Tele monitoring, and VSS with 3 different 2 second pauses before shift started. Call light and belongings within reach, bed in lowest and locked position, and Pt educated on use of call light. Will continue to monitor.

## 2019-10-20 NOTE — CARE PLAN
Problem: Communication  Goal: The ability to communicate needs accurately and effectively will improve  Outcome: PROGRESSING AS EXPECTED     Problem: Safety  Goal: Will remain free from injury  Outcome: PROGRESSING AS EXPECTED  Goal: Will remain free from falls  Outcome: PROGRESSING AS EXPECTED     Problem: Infection  Goal: Will remain free from infection  Outcome: PROGRESSING AS EXPECTED     Problem: Venous Thromboembolism (VTW)/Deep Vein Thrombosis (DVT) Prevention:  Goal: Patient will participate in Venous Thrombosis (VTE)/Deep Vein Thrombosis (DVT)Prevention Measures  Outcome: PROGRESSING AS EXPECTED     Problem: Bowel/Gastric:  Goal: Normal bowel function is maintained or improved  Outcome: PROGRESSING AS EXPECTED

## 2019-10-20 NOTE — PROGRESS NOTES
2 RN skin check complete.   Devices in place IV, monitor.  Skin assessed under devices yes.  Confirmed pressure ulcers found on none.  New potential pressure ulcers noted on abdominal fold and groin red and moist . Wound consult placed no.  The following interventions in place

## 2019-10-20 NOTE — H&P
Hospital Medicine History & Physical Note    Date of Service  10/19/2019    Primary Care Physician  Pcp Pt States None    Consultants  Cardiology    Code Status  Full code    Chief Complaint  Syncope    History of Presenting Illness  54 y.o. male who presented 10/19/2019 with history of orthostatic hypotension recurrent syncope and A. fib presented to the emergency room for evaluation of syncope.  He has had an extensive evaluation in the past with cardiology and neurology  he was previously on Xarelto but this was discontinued due to his recurrent syncope and increased risk of bleeding.  He has been maintained on Florinef Midodrin and pyridostigmine.  He reports that this morning while getting up to go take his medications he felt lightheaded and had a syncopal episode.  He regained consciousness spontaneously.  He denies any chest pain.  He was transferred to the emergency room where he was noted to be in A. fib with RVR.  I discussed with Dr. Sosa  and recommended giving 1 dose of IV Cardizem 25 mg with good response.  Patient was seen in the past by Dr. Black but at that time the plan was to discontinue his amiodarone and evaluate for possible ablation.  He never followed up with cardiology.    Review of Systems  Review of Systems   All other systems reviewed and are negative.      Past Medical History   has a past medical history of A-fib (HCC), Chest pain, Congestive heart failure (HCC), Diabetes (HCC), Diabetic neuropathy (HCC), Hypercholesterolemia, Hypertension, Morbid obesity (HCC), Noncompliance, Normal cardiac stress test, and Orthostatic hypotension.    Surgical History   has a past surgical history that includes toe amputation (Right, 11/10/2017); toe amputation (Right, 1/17/2018); irrigation & debridement ortho (Right, 2/19/2018); and zzz cardiac cath (07/21/2018).     Family History  family history includes No Known Problems in his father and mother.     Social History   reports that he has never  smoked. He has never used smokeless tobacco. He reports that he does not drink alcohol or use drugs.    Allergies  Allergies   Allergen Reactions   • Daptomycin Rash     Body rash  RXN=1/2018     • Pcn [Penicillins] Hives and Rash      Rash & hives  RXN=since a kid   • Unasyn [Ampicillin-Sulbactam Sodium] Hives, Itching and Swelling   • Vancomycin Rash     Red man syndrome       Medications  Prior to Admission Medications   Prescriptions Last Dose Informant Patient Reported? Taking?   FLUoxetine (PROZAC) 20 MG Cap 10/19/2019 at AM Patient No No   Sig: Take 3 Caps by mouth every day.   amiodarone (PACERONE) 100 MG tablet 10/19/2019 at AM Patient No No   Sig: Take 0.5 Tabs by mouth every day.   aspirin (ASA) 81 MG Chew Tab chewable tablet 10/19/2019 at AM Patient No No   Sig: Take 1 Tab by mouth every day.   cyanocobalamin (VITAMIN B12) 1000 MCG Tab 10/19/2019 at AM Patient No No   Sig: Take 1 Tab by mouth every day.   fludrocortisone (FLORINEF) 0.1 MG Tab 10/19/2019 at AM Patient No No   Sig: Take 2 Tabs by mouth every morning.   furosemide (LASIX) 20 MG Tab PRN at PRN Patient No No   Sig: Take 0.5 Tabs by mouth 1 time daily as needed (Edema, Shortness of breath or weight gain.).   magnesium oxide (MAG-OX) 400 (241.3 Mg) MG Tab tablet 10/19/2019 at AM Patient No No   Sig: Take 1 Tab by mouth every day.   midodrine (PROAMATINE) 10 MG tablet 10/19/2019 at AM Patient No No   Sig: Take 1 Tab by mouth 3 times a day.   pravastatin (PRAVACHOL) 20 MG Tab 10/19/2019 at AM Patient No No   Sig: Take 1 Tab by mouth every day.   pyridostigmine (MESTINON) 60 MG Tab 10/19/2019 at AM Patient No No   Sig: Take 2 Tabs by mouth 3 times a day.   risperiDONE (RISPERDAL) 3 MG Tab 10/18/2019 at PM Patient No No   Sig: Take 1 Tab by mouth every evening.   sodium chloride (SALT) 1 GM Tab 10/19/2019 at AM Patient No No   Sig: Take 1 Tab by mouth 3 times a day, with meals.      Facility-Administered Medications: None       Physical Exam  Temp:   [36.6 °C (97.9 °F)-36.9 °C (98.4 °F)] 36.9 °C (98.4 °F)  Pulse:  [] 90  Resp:  [15-20] 18  BP: (105-169)/() 135/76  SpO2:  [90 %-100 %] 99 %    Physical Exam   Constitutional: He is oriented to person, place, and time. He appears well-developed and well-nourished.   HENT:   Head: Normocephalic and atraumatic.   Right Ear: External ear normal.   Left Ear: External ear normal.   Mouth/Throat: No oropharyngeal exudate.   Eyes: Conjunctivae are normal. Right eye exhibits no discharge. Left eye exhibits no discharge. No scleral icterus.   Neck: Neck supple. No JVD present. No tracheal deviation present.   Cardiovascular: Exam reveals no gallop and no friction rub.   No murmur heard.  Tachycardia irregular irregular   Pulmonary/Chest: Effort normal. No stridor. No respiratory distress. He has decreased breath sounds. He has no wheezes. He has no rales. He exhibits no tenderness.   Abdominal: Soft. Bowel sounds are normal. He exhibits no distension. There is no tenderness. There is no rebound.   Musculoskeletal: He exhibits edema. He exhibits no tenderness.   Status post right first toe amputation   Neurological: He is alert and oriented to person, place, and time. No cranial nerve deficit. He exhibits normal muscle tone.   Skin: Skin is warm and dry. He is not diaphoretic. No cyanosis. Nails show no clubbing.   Chronic stasis dermatitis changes   Psychiatric: He has a normal mood and affect. His behavior is normal. Thought content normal.   Nursing note and vitals reviewed.      Laboratory:  Recent Labs     10/19/19  0830   WBC 7.9   RBC 5.93   HEMOGLOBIN 16.9   HEMATOCRIT 51.1   MCV 86.2   MCH 28.5   MCHC 33.1*   RDW 39.6   PLATELETCT 186   MPV 10.7     Recent Labs     10/19/19  0830   SODIUM 135   POTASSIUM 4.2   CHLORIDE 98   CO2 27   GLUCOSE 161*   BUN 40*   CREATININE 1.25   CALCIUM 10.1     Recent Labs     10/19/19  0830   ALTSGPT 29   ASTSGOT 31   ALKPHOSPHAT 87   TBILIRUBIN 0.7   GLUCOSE 161*      Recent Labs     10/19/19  0830   APTT 27.7   INR 0.97     Recent Labs     10/19/19  0830   NTPROBNP 1451*         Recent Labs     10/19/19  0830 10/19/19  1030   TROPONINT 37* 39*       Urinalysis:    No results found     Imaging:  CT-CTA CHEST PULMONARY ARTERY W/ RECONS   Final Result      1.  No CT evidence for pulmonary emboli.   2.  No pneumonia or pneumothorax.   3.  Minimal bibasilar atelectasis.            DX-CHEST-PORTABLE (1 VIEW)   Final Result      1.  Mild pulmonary vascular congestion.   2.  No lobar pneumonia or overt pulmonary edema.            Assessment/Plan:  I anticipate this patient is appropriate for observation status at this time.    * Chronic atrial fibrillation  Assessment & Plan  Will start patient on metoprolol and titrate as tolerated to the lowest dose for rate control  We will ask for cardiology input this patient has not followed up with cardiology since his last visit  Given his recurrent syncope he is at high risk for bleeding so he has not been maintained on anticoagulation given this fact I doubt he will be a candidate for ablation or cardioversion    Orthostatic hypotension  Assessment & Plan  Thought to be secondary to autonomic dysfunction  Continue salt tablets Florinef Midodrin and Mestinon      Venous stasis dermatitis of both lower extremities- (present on admission)  Assessment & Plan  Support stocking as tolerated    Pre-diabetes  Assessment & Plan  Check HbA1c      Nonischemic cardiomyopathy (HCC)- (present on admission)  Assessment & Plan  Thought to be secondary to tachycardia  Last echocardiogram EF normalized  He received 1 dose of IV Lasix in the emergency room we will hold off on further diuresis given his history of orthostatic hypotension and syncope  Monitor fluid status      VTE prophylaxis: lovenox

## 2019-10-20 NOTE — PROGRESS NOTES
Monitor Summary    Afib 54-99 bradying down to 36 with rare PVCs, and two 2.2 second pauses  -/.10/-

## 2019-10-20 NOTE — PROGRESS NOTES
Hospital Medicine Daily Progress Note    Date of Service: 10/20/2019 Code Status: Full Code     Chief Complaint  Chief Complaint   Patient presents with   • Syncope   • Chest Pain       Consultants/Specialty: Cardiology - signed off Disposition: Remain IP     Hospital Course     ER and Admission Day course  54 y.o. male who presented 10/19/2019 with history of orthostatic hypotension recurrent syncope and A. fib presented to the emergency room for evaluation of syncope.  He has had an extensive evaluation in the past with cardiology and neurology  he was previously on Xarelto but this was discontinued due to his recurrent syncope and increased risk of bleeding.  He has been maintained on Florinef Midodrin and pyridostigmine.  He reports that this morning while getting up to go take his medications he felt lightheaded and had a syncopal episode.  He regained consciousness spontaneously.  He denies any chest pain.  He was transferred to the emergency room where he was noted to be in A. fib with RVR.  I discussed with Dr. Sosa  and recommended giving 1 dose of IV Cardizem 25 mg with good response.  Patient was seen in the past by Dr. Black but at that time the plan was to discontinue his amiodarone and evaluate for possible ablation.  He never followed up with cardiology. He has had more syncopal spells despite the pyridostigmine, florinef, and midodrine. He came in with another syncopal spell after getting up. Found to be in AF with slight RVR here, rates better with IV dilt. Still on quarter dose amiodarone.   10/20 increased metoprolol to 100mg bid per card recommendations to titirate bb.  Discontinued amio, not candidate for anti-coagulation, not candidate for ablation.  Cardiology signed off with their recommendations.  Agree with their plan. But patient is profoundly fluid overloaded interstitially.  Starting lasix 40mg iv bid.  Patien was consumting >80oz of soda daily and probably >128oz of total fluid daily up  until recently when he quit the soda.       Interval Problem Update  Patient was seen and evaluated today for syncope and chest pain      Review of Systems  Review of Systems   Constitutional: Negative for fever and malaise/fatigue.   Respiratory: Positive for shortness of breath. Negative for cough.    Cardiovascular: Positive for orthopnea and leg swelling. Negative for chest pain.   Skin: Negative for itching and rash.   Psychiatric/Behavioral: Negative for depression. The patient is not nervous/anxious.     Physical Exam  Physical Exam   Constitutional: He is oriented to person, place, and time. No distress.   Pulmonary/Chest: No respiratory distress.   Crackles bilaterally   Abdominal: Soft. He exhibits no distension.   Musculoskeletal: He exhibits edema.   Neurological: He is alert and oriented to person, place, and time. Coordination normal.   Skin: Skin is warm and dry. He is not diaphoretic. No erythema.   Very watery hydrated adiposity, his leg edema rests on the bed in an almost gum-drop like manner consistent with water saturation behavior, not fat   Psychiatric: He has a normal mood and affect. His behavior is normal. Judgment and thought content normal.   Nursing note and vitals reviewed.       I/Os    Intake/Output Summary (Last 24 hours) at 10/20/2019 0920  Last data filed at 10/19/2019 1700  Gross per 24 hour   Intake --   Output 700 ml   Net -700 ml    Vital Signs  Temp:  [36 °C (96.8 °F)-36.9 °C (98.4 °F)] 36.6 °C (97.8 °F)  Pulse:  [] 92  Resp:  [12-20] 15  BP: ()/() 115/60  SpO2:  [90 %-100 %] 97 %     Laboratory  Results from last 7 days   Lab Units 10/20/19  0334 10/19/19  0830   WBC 1501 K/uL 8.8 7.9   HGB 1503 g/dL 16.1 16.9   HCT 1504 % 50.4 51.1   PLATELET COUNT 1518 K/uL 179 186    Results from last 7 days   Lab Units 10/20/19  0334 10/19/19  0830   SODIUM 101 mmol/L 136 135   POTASSIUM 102 mmol/L 3.6 4.2   CHLORIDE 103 mmol/L 98 98   CO2 104 mmol/L 27 27   ANION GAP  ANION  11.0 10.0    mg/dL 35* 40*   CREATININE 109 mg/dL 1.04 1.25   CALCIUM 105 mg/dL 9.7 10.1   GLUCOSE 112 mg/dL 148* 161*   IF PEEWEE AMER [id number] mL/min/1.73 m 2 >60 >60   IF NON AFRICAN AMER 109GFRB mL/min/1.73 m 2 >60 60   MAGNESIUM 561 mg/dL  --  2.0           Medications    Scheduled medications:  furosemide 20 mg Intravenous Once   aspirin 81 mg Oral DAILY   cyanocobalamin 1,000 mcg Oral DAILY   fludrocortisone 0.2 mg Oral QAM   FLUoxetine 60 mg Oral DAILY   magnesium oxide 400 mg Oral DAILY   midodrine 10 mg Oral TID   pravastatin 20 mg Oral DAILY   pyridostigmine 120 mg Oral TID   risperiDONE 3 mg Oral Q EVENING   sodium chloride 1 g Oral TID WITH MEALS   senna-docusate 2 Tab Oral BID   enoxaparin 40 mg Subcutaneous DAILY   metoprolol 25 mg Oral Q8HRS        Medications were reviewed today.      Assessment/Plan  * Chronic atrial fibrillation- (present on admission)  Assessment & Plan  10/20 - cardiology signed off in the ER.  His atrial fibrillation is related to his vascular distension from fluid overload.  10/19 - adm - Will start patient on metoprolol and titrate as tolerated to the lowest dose for rate control. We will ask for cardiology input this patient has not followed up with cardiology since his last visit. Given his recurrent syncope he is at high risk for bleeding so he has not been maintained on anticoagulation given this fact I doubt he will be a candidate for ablation or cardioversion    Orthostatic hypotension- (present on admission)  Assessment & Plan  10/20 - Multi-factorial, suspicion for prescribing cascade.  Patient is on numerous competing meds.  Patient is also profoundly fluid overload with hydrated adiposity that would make the healthiest of hearts have to adjust to mobilize his lower extremity venous blood supply.  Lasix 40mg iv bid started to begin diuresing this fluid off as first step.  Decreased his mestinon down to 60mg dose from 90mg dose  10/19 - adm - Thought to be  secondary to autonomic dysfunction. Continue salt tablets Florinef Midodrin and Mestinon      Venous stasis dermatitis of both lower extremities- (present on admission)  Assessment & Plan  10/20 - profoundly fluid overloaded interstitially.  Have to get this patient's water off.  Correcting his profound interstitial total body water would begin to restore his venous drainage, which would then begin to restore his lymphatic drainage.  10/19 - adm - Support stocking as tolerated    Polypharmacy- (present on admission)  Assessment & Plan  10/20 - Patient is on numerous conflicting medications. By definition, polypharmacy is 5 or more medications, patient is on more than 5 medications that are all cholinergic and competing effect medications.  Cannot rule out that this entire sequence is a prescribing cascade.  Will start to wean medications with decreasing his mestinon down to 60mg from 90mg.  Discussion with pharmacist, who has worked with this patient during previous admissions where his orthostatic hypotension has been quite resistent.  Would continue to wean vasoactive medications aggressively while inpatient.    aspirin (ASA) 81 MG Chew Tab chewable tablet Take 1 Tab by mouth every day. Jose An M.D. Reordered   amiodarone (PACERONE) 100 MG tablet Take 0.5 Tabs by mouth every day. Jose An M.D. Not Ordered   FLUoxetine (PROZAC) 20 MG Cap Take 3 Caps by mouth every day. Jose An M.D. Reordered   pravastatin (PRAVACHOL) 20 MG Tab Take 1 Tab by mouth every day. Jose An M.D. Reordered   risperiDONE (RISPERDAL) 3 MG Tab Take 1 Tab by mouth every evening. Jose An M.D. Reordered   fludrocortisone (FLORINEF) 0.1 MG Tab Take 2 Tabs by mouth every morning. Jose An M.D. Reordered   furosemide (LASIX) 20 MG Tab Take 0.5 Tabs by mouth 1 time daily as needed (Edema, Shortness of breath or weight gain.). Jose An M.D. Not Ordered    cyanocobalamin (VITAMIN B12) 1000 MCG Tab Take 1 Tab by mouth every day. Jose An M.D. Reordered   magnesium oxide (MAG-OX) 400 (241.3 Mg) MG Tab tablet Take 1 Tab by mouth every day. Jose An M.D. Reordered   midodrine (PROAMATINE) 10 MG tablet Take 1 Tab by mouth 3 times a day. Jose An M.D. Reordered   pyridostigmine (MESTINON) 60 MG Tab Take 2 Tabs by mouth 3 times a day. Jose An M.D. Not Ordered   sodium chloride (SALT) 1 GM Tab Take 1 Tab by mouth 3 times a day, with meals. Jose An M.D. Reordered           Pre-diabetes- (present on admission)  Assessment & Plan  10/20 - stable, a1c pending.  Patient does have a trend of improvement over the last year.  10/19 - adm - Check HbA1c  Glycohemoglobin   Date Value Ref Range Status   12/28/2018 5.9 (H) 0.0 - 5.6 % Final     Comment:   10/23/2018 6.3 (H) 0.0 - 5.6 % Final     Comment:   07/09/2018 6.5 (H) 0.0 - 5.6 % Final     Comment:       Nonischemic cardiomyopathy (HCC)- (present on admission)  Assessment & Plan  10/20 - stable - restarted iv lasix 40mg bid.  10/19 - adm - Thought to be secondary to tachycardia. Last echocardiogram EF normalized. He received 1 dose of IV Lasix in the emergency room we will hold off on further diuresis given his history of orthostatic hypotension and syncope. Monitor fluid status.  Results from last 7 days   Lab Units 10/19/19  1030 10/19/19  0830   TROPONIN T [id number]  ng/L 39* 37*            VTE prophylaxis: lovenox    Imaging  CT-CTA CHEST PULMONARY ARTERY W/ RECONS   Final Result      1.  No CT evidence for pulmonary emboli.   2.  No pneumonia or pneumothorax.   3.  Minimal bibasilar atelectasis.            DX-CHEST-PORTABLE (1 VIEW)   Final Result      1.  Mild pulmonary vascular congestion.   2.  No lobar pneumonia or overt pulmonary edema.

## 2019-10-20 NOTE — PROGRESS NOTES
Pt alert and oriented x4.  Denies pain.  Photos taken of redness of groin and abdominal folds and MD notified and ordered Nystatin powder which pt states he was using at home

## 2019-10-20 NOTE — PROGRESS NOTES
Talked with on call hospitalist Dr. Palencia and informed him of pt having multiple 2 second pauses right before shift change as well as BP of 98/64. Dr. Palencia told RN to hold metoprolol for 2200.

## 2019-10-21 ENCOUNTER — PATIENT OUTREACH (OUTPATIENT)
Dept: HEALTH INFORMATION MANAGEMENT | Facility: OTHER | Age: 54
End: 2019-10-21

## 2019-10-21 PROCEDURE — A9270 NON-COVERED ITEM OR SERVICE: HCPCS | Performed by: HOSPITALIST

## 2019-10-21 PROCEDURE — 700101 HCHG RX REV CODE 250: Performed by: STUDENT IN AN ORGANIZED HEALTH CARE EDUCATION/TRAINING PROGRAM

## 2019-10-21 PROCEDURE — 99226 PR SUBSEQUENT OBSERVATION CARE,LEVEL III: CPT | Performed by: STUDENT IN AN ORGANIZED HEALTH CARE EDUCATION/TRAINING PROGRAM

## 2019-10-21 PROCEDURE — G0378 HOSPITAL OBSERVATION PER HR: HCPCS

## 2019-10-21 PROCEDURE — 700111 HCHG RX REV CODE 636 W/ 250 OVERRIDE (IP): Performed by: STUDENT IN AN ORGANIZED HEALTH CARE EDUCATION/TRAINING PROGRAM

## 2019-10-21 PROCEDURE — 700102 HCHG RX REV CODE 250 W/ 637 OVERRIDE(OP): Performed by: HOSPITALIST

## 2019-10-21 PROCEDURE — 96372 THER/PROPH/DIAG INJ SC/IM: CPT

## 2019-10-21 PROCEDURE — 700102 HCHG RX REV CODE 250 W/ 637 OVERRIDE(OP): Performed by: STUDENT IN AN ORGANIZED HEALTH CARE EDUCATION/TRAINING PROGRAM

## 2019-10-21 PROCEDURE — 700111 HCHG RX REV CODE 636 W/ 250 OVERRIDE (IP): Performed by: HOSPITALIST

## 2019-10-21 PROCEDURE — A9270 NON-COVERED ITEM OR SERVICE: HCPCS | Performed by: STUDENT IN AN ORGANIZED HEALTH CARE EDUCATION/TRAINING PROGRAM

## 2019-10-21 PROCEDURE — 96376 TX/PRO/DX INJ SAME DRUG ADON: CPT

## 2019-10-21 RX ORDER — METOPROLOL TARTRATE 1 MG/ML
5 INJECTION, SOLUTION INTRAVENOUS
Status: DISCONTINUED | OUTPATIENT
Start: 2019-10-21 | End: 2019-10-26 | Stop reason: HOSPADM

## 2019-10-21 RX ORDER — METOPROLOL TARTRATE 1 MG/ML
INJECTION, SOLUTION INTRAVENOUS
Status: ACTIVE
Start: 2019-10-21 | End: 2019-10-21

## 2019-10-21 RX ADMIN — FLUDROCORTISONE ACETATE 0.2 MG: 0.1 TABLET ORAL at 05:35

## 2019-10-21 RX ADMIN — ONDANSETRON 4 MG: 4 TABLET, ORALLY DISINTEGRATING ORAL at 09:21

## 2019-10-21 RX ADMIN — Medication 1 G: at 12:00

## 2019-10-21 RX ADMIN — MIDODRINE HYDROCHLORIDE 10 MG: 5 TABLET ORAL at 14:12

## 2019-10-21 RX ADMIN — METOPROLOL TARTRATE 5 MG: 5 INJECTION, SOLUTION INTRAVENOUS at 09:22

## 2019-10-21 RX ADMIN — PYRIDOSTIGMINE BROMIDE 90 MG: 60 TABLET ORAL at 05:35

## 2019-10-21 RX ADMIN — METOPROLOL TARTRATE 12.5 MG: 25 TABLET, FILM COATED ORAL at 17:24

## 2019-10-21 RX ADMIN — CYANOCOBALAMIN TAB 500 MCG 1000 MCG: 500 TAB at 05:35

## 2019-10-21 RX ADMIN — MIDODRINE HYDROCHLORIDE 10 MG: 5 TABLET ORAL at 17:23

## 2019-10-21 RX ADMIN — PYRIDOSTIGMINE BROMIDE 90 MG: 60 TABLET ORAL at 11:59

## 2019-10-21 RX ADMIN — FUROSEMIDE 40 MG: 10 INJECTION, SOLUTION INTRAMUSCULAR; INTRAVENOUS at 05:34

## 2019-10-21 RX ADMIN — PYRIDOSTIGMINE BROMIDE 90 MG: 60 TABLET ORAL at 17:20

## 2019-10-21 RX ADMIN — FLUOXETINE HYDROCHLORIDE 60 MG: 20 CAPSULE ORAL at 05:35

## 2019-10-21 RX ADMIN — MAGNESIUM GLUCONATE 500 MG ORAL TABLET 400 MG: 500 TABLET ORAL at 05:35

## 2019-10-21 RX ADMIN — NYSTATIN: 100000 POWDER TOPICAL at 05:38

## 2019-10-21 RX ADMIN — Medication 1 G: at 09:21

## 2019-10-21 RX ADMIN — METOPROLOL TARTRATE 12.5 MG: 25 TABLET, FILM COATED ORAL at 11:59

## 2019-10-21 RX ADMIN — FUROSEMIDE 40 MG: 10 INJECTION, SOLUTION INTRAMUSCULAR; INTRAVENOUS at 17:25

## 2019-10-21 RX ADMIN — ASPIRIN 81 MG 81 MG: 81 TABLET ORAL at 05:36

## 2019-10-21 RX ADMIN — MIDODRINE HYDROCHLORIDE 10 MG: 5 TABLET ORAL at 05:36

## 2019-10-21 RX ADMIN — ENOXAPARIN SODIUM 40 MG: 100 INJECTION SUBCUTANEOUS at 05:34

## 2019-10-21 RX ADMIN — Medication 1 G: at 17:30

## 2019-10-21 RX ADMIN — NYSTATIN: 100000 POWDER TOPICAL at 18:00

## 2019-10-21 RX ADMIN — PRAVASTATIN SODIUM 20 MG: 20 TABLET ORAL at 05:36

## 2019-10-21 RX ADMIN — RISPERIDONE 3 MG: 1 TABLET, FILM COATED ORAL at 17:23

## 2019-10-21 RX ADMIN — SENNOSIDES, DOCUSATE SODIUM 2 TABLET: 50; 8.6 TABLET, FILM COATED ORAL at 17:23

## 2019-10-21 SDOH — ECONOMIC STABILITY: TRANSPORTATION INSECURITY
IN THE PAST 12 MONTHS, HAS LACK OF TRANSPORTATION KEPT YOU FROM MEETINGS, WORK, OR FROM GETTING THINGS NEEDED FOR DAILY LIVING?: NO

## 2019-10-21 SDOH — ECONOMIC STABILITY: FOOD INSECURITY: WITHIN THE PAST 12 MONTHS, YOU WORRIED THAT YOUR FOOD WOULD RUN OUT BEFORE YOU GOT MONEY TO BUY MORE.: NEVER TRUE

## 2019-10-21 SDOH — ECONOMIC STABILITY: INCOME INSECURITY: HOW HARD IS IT FOR YOU TO PAY FOR THE VERY BASICS LIKE FOOD, HOUSING, MEDICAL CARE, AND HEATING?: NOT HARD AT ALL

## 2019-10-21 SDOH — ECONOMIC STABILITY: FOOD INSECURITY: WITHIN THE PAST 12 MONTHS, THE FOOD YOU BOUGHT JUST DIDN'T LAST AND YOU DIDN'T HAVE MONEY TO GET MORE.: NEVER TRUE

## 2019-10-21 SDOH — ECONOMIC STABILITY: TRANSPORTATION INSECURITY
IN THE PAST 12 MONTHS, HAS THE LACK OF TRANSPORTATION KEPT YOU FROM MEDICAL APPOINTMENTS OR FROM GETTING MEDICATIONS?: NO

## 2019-10-21 NOTE — PROGRESS NOTES
Spiritual Care Note    Referral To: Community clergy   (REFERRED TO FR. DELGADO TODAY 11:00am)    Patient Information     Patient's Name: Sebastián Castro   MRN: [id number]    YOB: 1965   Age and Gender: 54 y.o. male   Service Area: [address]   Room (and Bed): T713/02   Ethnicity or Nationality:     Primary Language: English   Rastafarian/Spiritual preference: Restorationist   Place of Residence: [place], [place]   Medical Diagnosis(-es)/Procedure(s): A-fib   Code Status: Full Code    Date of Admission: 10/19/2019   Length of Stay: 0 days        Spiritual Care Provider Information:  Name of Spiritual Care Provider: Cathie Pineda  Title of Spiritual Care Provider: Associate   Phone Number: [phone]  E-mail: [email or web address]  Total time : 10 minutes    Spiritual Screen Results:    Gen Nursing  Spiritual Screen  Is your spiritual health or inner well-being important to you as you cope with your medical condition?: Yes  Would you like to receive a visit from our Spiritual Care team or your own Zoroastrian or spiritual leader?: Yes  Was spiritual care education provided to the patient?: Declined  Sources of Hope/Kimberly: Higher power, Nature     Palliative Care  PC Rastafarian/Spiritual Screening  Was spiritual care education provided to the patient?: Declined      Encounter/Request Information  Encounter/Request Type   Referral From/ Origin of Request: Epic nursing      Religous Needs/Values       Spiritual Assessment   Spiritual Care Encounters  Plan: Visit Upon Request    Notes:

## 2019-10-21 NOTE — CARE PLAN
Problem: Venous Thromboembolism (VTW)/Deep Vein Thrombosis (DVT) Prevention:  Goal: Patient will participate in Venous Thrombosis (VTE)/Deep Vein Thrombosis (DVT)Prevention Measures  Outcome: PROGRESSING AS EXPECTED  Note:   Pt on subq Lovenox       Problem: Respiratory:  Goal: Respiratory status will improve  Outcome: PROGRESSING AS EXPECTED  Note:   Pt sating well on 1L of 02 nasal cannula

## 2019-10-21 NOTE — PROGRESS NOTES
CHANNALISE Thomas met with pt in hospital 10/21. Pt states that he has PCP Lev at [facility] and also works with a  named Frederic at [facility]. Pt was also excited that he just got approved for SSI. Pt states he has a good support system with friends and lives in a studio apartment with a roommate. Pt is confident in managing own health but is interested in talking with a RN after overall health management.     .Community Health Worker Intake  • Social determinates of health intake completed  • Identified barriers to none  • Contact information provided to Sebastián  • PCP = Lev at [facility]  • Possible referral to RN     Plan:   CHW will do f/u phone call with pt after d/c.

## 2019-10-21 NOTE — DISCHARGE PLANNING
SW met with patient for assessment. Patient does not have any visitors at this time.     Patient is a 54-year old  male who lives in supportive housing with [facility]. Patient has lived in the [facility] housing for 1-month now. Patient reports his  is Yonis at [phone] [phone]. He has a roommate, receives transportation to and from medical and mental health appointments, medication management, and ensures he attends psychiatric appointments.     Patient reports he was just approved for SS disability, and is now in the process of getting his payee set up to receive him income.    Patient's PCP is Dr. Charles. Patient reports he see's a psychiatrist as well. He reports he has high blood pressure, has depression, and anxiety. He reports he normally takes medication for all his medical issues and takes Prozac for depression and an anti-anxiety medication for anxiety. Patient is normally independent with ADLs and IADL's. Denies AOD. Patient does not have AD on file, reports full code.     Patient reports SW can call [facility] for possible transportation home once D/C.     IP Consult: Suicidal/Homicidal     Patient is not suicidal or homicidal at this time.     SW contacted [facility]AYL[facility] who reported that transportation van can be set up in advance.     Care Transition Team Assessment    Information Source  Orientation : Oriented x 4  Information Given By: Patient  Informant's Name: Sebastián  Who is responsible for making decisions for patient? : Patient    Readmission Evaluation  Is this a readmission?: No    Elopement Risk  Legal Hold: No  Ambulatory or Self Mobile in Wheelchair: Yes  Disoriented: No  Psychiatric Symptoms: None  History of Wandering: No  Elopement this Admit: No  Vocalizing Wanting to Leave: No  Displays Behaviors, Body Language Wanting to Leave: No-Not at Risk for Elopement  Elopement Risk: Not at Risk for Elopement    Interdisciplinary Discharge Planning  Does Admitting Nurse Feel This  Could be a Complex Discharge?: No  Primary Care Physician: Dr Leo  [facility]  Lives with - Patient's Self Care Capacity: Other (Comments)  Patient or legal guardian wants to designate a caregiver (see row info): No  Support Systems: None  Housing / Facility: Assisted Living Residence  Name of Care Facility: [facility]   Do You Take your Prescribed Medications Regularly: Yes  Able to Return to Previous ADL's: No  Mobility Issues: No  Prior Services: Home With Outpatient Therapy  Patient Expects to be Discharged to:: [facility]  Assistance Needed: No  Durable Medical Equipment: Not Applicable    Discharge Preparedness  What is your plan after discharge?: Other (comment)([facility] housing)  What are your discharge supports?: Other (comment)([facility] Staff)  Prior Functional Level: Ambulatory, Independent with Activities of Daily Living, Independent with Medication Management  Difficulity with ADLs: None  Difficulity with IADLs: None    Functional Assesment  Prior Functional Level: Ambulatory, Independent with Activities of Daily Living, Independent with Medication Management    Finances  Financial Barriers to Discharge: No  Prescription Coverage: Yes    Vision / Hearing Impairment  Vision Impairment : No  Hearing Impairment : No         Advance Directive  Advance Directive?: None    Domestic Abuse  Have you ever been the victim of abuse or violence?: No  Physical Abuse or Sexual Abuse: No  Verbal Abuse or Emotional Abuse: No  Possible Abuse Reported to:: Not Applicable    Psychological Assessment  History of Substance Abuse: None  History of Psychiatric Problems: Yes(Depression/Anxiety)  Non-compliant with Treatment: No  Newly Diagnosed Illness: No    Discharge Risks or Barriers  Discharge risks or barriers?: No    Anticipated Discharge Information  Anticipated discharge disposition: Home(Supportive Housing)  Discharge Address: ([address] [place] [address], [place] [address])  Discharge Contact Phone Number:  ([phone])

## 2019-10-21 NOTE — PROGRESS NOTES
Pt rec'd in bed, awake.  Denies pain.  V/S stable.  Safety reinforced and call bell placed in reach.  Will continue to monitor pt.

## 2019-10-21 NOTE — DISCHARGE PLANNING
Patient is eligible for Medicaid Meds to Beds at discharge if they have coverage with [place] Medicaid, Medicaid FFS, Medicaid HMO (\[facility] [place] [facility]\""), or [place]. This service is provided through the [facility] if orders are received by the pharmacy prior to 4pm Monday through Friday. Preferred pharmacy has been changed to [facility]. Please call [phone] prior to discharge.

## 2019-10-21 NOTE — PROGRESS NOTES
Pt alert and oriented x4.  MD spent time reviewing pt's home meds and discussing changes with pt.  Pt denied pain throughout shift.

## 2019-10-22 LAB
ANION GAP SERPL CALC-SCNC: 12 MMOL/L (ref 0–11.9)
BUN SERPL-MCNC: 36 MG/DL (ref 8–22)
CALCIUM SERPL-MCNC: 9.6 MG/DL (ref 8.5–10.5)
CHLORIDE SERPL-SCNC: 95 MMOL/L (ref 96–112)
CO2 SERPL-SCNC: 31 MMOL/L (ref 20–33)
CREAT SERPL-MCNC: 1.17 MG/DL (ref 0.5–1.4)
EST. AVERAGE GLUCOSE BLD GHB EST-MCNC: 174 MG/DL
GLUCOSE SERPL-MCNC: 166 MG/DL (ref 65–99)
HBA1C MFR BLD: 7.7 % (ref 0–5.6)
MAGNESIUM SERPL-MCNC: 2 MG/DL (ref 1.5–2.5)
POTASSIUM SERPL-SCNC: 3.3 MMOL/L (ref 3.6–5.5)
SODIUM SERPL-SCNC: 138 MMOL/L (ref 135–145)

## 2019-10-22 PROCEDURE — 700102 HCHG RX REV CODE 250 W/ 637 OVERRIDE(OP): Performed by: STUDENT IN AN ORGANIZED HEALTH CARE EDUCATION/TRAINING PROGRAM

## 2019-10-22 PROCEDURE — 700111 HCHG RX REV CODE 636 W/ 250 OVERRIDE (IP): Performed by: STUDENT IN AN ORGANIZED HEALTH CARE EDUCATION/TRAINING PROGRAM

## 2019-10-22 PROCEDURE — G0378 HOSPITAL OBSERVATION PER HR: HCPCS

## 2019-10-22 PROCEDURE — 36415 COLL VENOUS BLD VENIPUNCTURE: CPT

## 2019-10-22 PROCEDURE — 83735 ASSAY OF MAGNESIUM: CPT

## 2019-10-22 PROCEDURE — 96376 TX/PRO/DX INJ SAME DRUG ADON: CPT

## 2019-10-22 PROCEDURE — A9270 NON-COVERED ITEM OR SERVICE: HCPCS | Performed by: HOSPITALIST

## 2019-10-22 PROCEDURE — 80048 BASIC METABOLIC PNL TOTAL CA: CPT

## 2019-10-22 PROCEDURE — 99225 PR SUBSEQUENT OBSERVATION CARE,LEVEL II: CPT | Performed by: INTERNAL MEDICINE

## 2019-10-22 PROCEDURE — 700102 HCHG RX REV CODE 250 W/ 637 OVERRIDE(OP): Performed by: HOSPITALIST

## 2019-10-22 PROCEDURE — 700111 HCHG RX REV CODE 636 W/ 250 OVERRIDE (IP): Performed by: HOSPITALIST

## 2019-10-22 PROCEDURE — 96372 THER/PROPH/DIAG INJ SC/IM: CPT

## 2019-10-22 PROCEDURE — 83036 HEMOGLOBIN GLYCOSYLATED A1C: CPT

## 2019-10-22 PROCEDURE — A9270 NON-COVERED ITEM OR SERVICE: HCPCS | Performed by: STUDENT IN AN ORGANIZED HEALTH CARE EDUCATION/TRAINING PROGRAM

## 2019-10-22 RX ADMIN — CYANOCOBALAMIN TAB 500 MCG 1000 MCG: 500 TAB at 05:12

## 2019-10-22 RX ADMIN — ENOXAPARIN SODIUM 40 MG: 100 INJECTION SUBCUTANEOUS at 05:14

## 2019-10-22 RX ADMIN — FLUOXETINE HYDROCHLORIDE 60 MG: 20 CAPSULE ORAL at 05:12

## 2019-10-22 RX ADMIN — RISPERIDONE 3 MG: 1 TABLET, FILM COATED ORAL at 18:00

## 2019-10-22 RX ADMIN — METOPROLOL TARTRATE 12.5 MG: 25 TABLET, FILM COATED ORAL at 05:13

## 2019-10-22 RX ADMIN — MAGNESIUM GLUCONATE 500 MG ORAL TABLET 400 MG: 500 TABLET ORAL at 05:13

## 2019-10-22 RX ADMIN — NYSTATIN: 100000 POWDER TOPICAL at 05:18

## 2019-10-22 RX ADMIN — FLUDROCORTISONE ACETATE 0.2 MG: 0.1 TABLET ORAL at 05:13

## 2019-10-22 RX ADMIN — ASPIRIN 81 MG 81 MG: 81 TABLET ORAL at 05:12

## 2019-10-22 RX ADMIN — SENNOSIDES, DOCUSATE SODIUM 2 TABLET: 50; 8.6 TABLET, FILM COATED ORAL at 18:00

## 2019-10-22 RX ADMIN — FUROSEMIDE 40 MG: 10 INJECTION, SOLUTION INTRAMUSCULAR; INTRAVENOUS at 05:13

## 2019-10-22 RX ADMIN — PYRIDOSTIGMINE BROMIDE 90 MG: 60 TABLET ORAL at 18:00

## 2019-10-22 RX ADMIN — PYRIDOSTIGMINE BROMIDE 90 MG: 60 TABLET ORAL at 05:13

## 2019-10-22 RX ADMIN — MIDODRINE HYDROCHLORIDE 10 MG: 5 TABLET ORAL at 12:00

## 2019-10-22 RX ADMIN — PYRIDOSTIGMINE BROMIDE 90 MG: 60 TABLET ORAL at 12:00

## 2019-10-22 RX ADMIN — PRAVASTATIN SODIUM 20 MG: 20 TABLET ORAL at 05:13

## 2019-10-22 RX ADMIN — METOPROLOL TARTRATE 12.5 MG: 25 TABLET, FILM COATED ORAL at 18:00

## 2019-10-22 RX ADMIN — MIDODRINE HYDROCHLORIDE 10 MG: 5 TABLET ORAL at 18:00

## 2019-10-22 RX ADMIN — MIDODRINE HYDROCHLORIDE 10 MG: 5 TABLET ORAL at 05:13

## 2019-10-22 RX ADMIN — NYSTATIN: 100000 POWDER TOPICAL at 18:00

## 2019-10-22 ASSESSMENT — ENCOUNTER SYMPTOMS
FEVER: 0
SHORTNESS OF BREATH: 1
NERVOUS/ANXIOUS: 0
DEPRESSION: 0
ORTHOPNEA: 1
COUGH: 0

## 2019-10-22 NOTE — PROGRESS NOTES
Hospital Medicine Daily Progress Note    Date of Service:  Code Status: Full Code     Chief Complaint  Chief Complaint   Patient presents with   • Syncope   • Chest Pain       Consultants/Specialty: Cardiology - signed off Disposition: Remain IP     Hospital Course     ER and Admission Day course  54 y.o. male who presented 10/19/2019 with history of orthostatic hypotension recurrent syncope and A. fib presented to the emergency room for evaluation of syncope.  He has had an extensive evaluation in the past with cardiology and neurology  he was previously on Xarelto but this was discontinued due to his recurrent syncope and increased risk of bleeding.  He has been maintained on Florinef Midodrin and pyridostigmine.  He reports that this morning while getting up to go take his medications he felt lightheaded and had a syncopal episode.  He regained consciousness spontaneously.  He denies any chest pain.  He was transferred to the emergency room where he was noted to be in A. fib with RVR.  I discussed with Dr. Sosa  and recommended giving 1 dose of IV Cardizem 25 mg with good response.  Patient was seen in the past by Dr. Black but at that time the plan was to discontinue his amiodarone and evaluate for possible ablation.  He never followed up with cardiology. He has had more syncopal spells despite the pyridostigmine, florinef, and midodrine. He came in with another syncopal spell after getting up. Found to be in AF with slight RVR here, rates better with IV dilt. Still on quarter dose amiodarone.     Orthostasis-still positive. Scared to get out of bed. TELE showed A fib HR 69-90, rare PVC's and one 2 second pause. VOlume status is equivocal today.        Interval Problem Update  Patient was seen and evaluated today for syncope and chest pain      Review of Systems  Review of Systems   Constitutional: Negative for fever and malaise/fatigue.   Respiratory: Positive for shortness of breath. Negative for cough.     Cardiovascular: Positive for orthopnea and leg swelling. Negative for chest pain.   Skin: Negative for itching and rash.   Psychiatric/Behavioral: Negative for depression. The patient is not nervous/anxious.     Physical Exam  Physical Exam   Constitutional: He is oriented to person, place, and time. No distress.   Pulmonary/Chest: No stridor. No respiratory distress.   Crackles bilaterally   Abdominal: Soft. He exhibits no distension.   Musculoskeletal: He exhibits edema (Unchanged today).   Neurological: He is alert and oriented to person, place, and time. Coordination normal.   Skin: Skin is warm and dry. He is not diaphoretic. No erythema.   Chronic venous stasis changes B/L LE's   Psychiatric: He has a normal mood and affect. His behavior is normal. Judgment and thought content normal.   Nursing note and vitals reviewed.       I/Os  No intake or output data in the 24 hours ending 10/22/19 1508 Vital Signs  Temp:  [36.1 °C (97 °F)-36.6 °C (97.9 °F)] 36.1 °C (97 °F)  Pulse:  [76-91] 91  Resp:  [16-18] 18  BP: (105-141)/(72-85) 141/85  SpO2:  [93 %-96 %] 95 %     Laboratory  Results from last 7 days   Lab Units 10/20/19  0334 10/19/19  0830   WBC 1501 K/uL 8.8 7.9   HGB 1503 g/dL 16.1 16.9   HCT 1504 % 50.4 51.1   PLATELET COUNT 1518 K/uL 179 186    Results from last 7 days   Lab Units 10/22/19  0823 10/20/19  0334 10/19/19  0830   SODIUM 101 mmol/L 138 136 135   POTASSIUM 102 mmol/L 3.3* 3.6 4.2   CHLORIDE 103 mmol/L 95* 98 98   CO2 104 mmol/L 31 27 27   ANION GAP ANION  12.0* 11.0 10.0    mg/dL 36* 35* 40*   CREATININE 109 mg/dL 1.17 1.04 1.25   CALCIUM 105 mg/dL 9.6 9.7 10.1   GLUCOSE 112 mg/dL 166* 148* 161*   IF PEEWEE AMER 845424 mL/min/1.73 m 2 >60 >60 >60   IF NON AFRICAN AMER 109GFRB mL/min/1.73 m 2 >60 >60 60   MAGNESIUM 561 mg/dL 2.0  --  2.0           Medications    Scheduled medications:  metoprolol 12.5 mg Oral TWICE DAILY   nystatin  Topical BID   furosemide 40 mg Intravenous BID DIURETIC    pyridostigmine 90 mg Oral TID   aspirin 81 mg Oral DAILY   cyanocobalamin 1,000 mcg Oral DAILY   fludrocortisone 0.2 mg Oral QAM   FLUoxetine 60 mg Oral DAILY   magnesium oxide 400 mg Oral DAILY   midodrine 10 mg Oral TID   pravastatin 20 mg Oral DAILY   risperiDONE 3 mg Oral Q EVENING   senna-docusate 2 Tab Oral BID   enoxaparin 40 mg Subcutaneous DAILY        Medications were reviewed today.      Assessment/Plan  * Chronic atrial fibrillation- (present on admission)  Assessment & Plan  -HR controlled, continue metoprolol 12.5 mg BID  10/19 - adm - Will start patient on metoprolol and titrate as tolerated to the lowest dose for rate control. We will ask for cardiology input this patient has not followed up with cardiology since his last visit. Given his recurrent syncope he is at high risk for bleeding so he has not been maintained on anticoagulation given this fact I doubt he will be a candidate for ablation or cardioversion    Orthostatic hypotension- (present on admission)  Assessment & Plan  -gentle diuresis  -continue outpatient medications  -unclear etiology  -thoroughly worked up in the past  -check BID orthostats      Venous stasis dermatitis of both lower extremities- (present on admission)  Assessment & Plan  Gentle diuresis, needs outpatient compression stockings    Polypharmacy- (present on admission)  Assessment & Plan  -polypharmacy in this particular clinical situation is necessary as patient has severe refractory orthostatic hypotension  -continue current medications and try to wean off what is possible    Pre-diabetes- (present on admission)  Assessment & Plan  -monitor sugars        Nonischemic cardiomyopathy (HCC)- (present on admission)  Assessment & Plan  -continue current IV lasix 40 mg BID         VTE prophylaxis: lovenox    Imaging  CT-CTA CHEST PULMONARY ARTERY W/ RECONS   Final Result      1.  No CT evidence for pulmonary emboli.   2.  No pneumonia or pneumothorax.   3.  Minimal  bibasilar atelectasis.            DX-CHEST-PORTABLE (1 VIEW)   Final Result      1.  Mild pulmonary vascular congestion.   2.  No lobar pneumonia or overt pulmonary edema.

## 2019-10-22 NOTE — CARE PLAN
Problem: Safety  Goal: Will remain free from falls  Outcome: PROGRESSING AS EXPECTED  Note:   Pt is AAOx4 and uses call bell appropriately.  Bed in lowest position, wheels locked and call bell placed in reach.     Problem: Respiratory:  Goal: Respiratory status will improve  Outcome: PROGRESSING AS EXPECTED  Note:   Pt sating well on 1L of 02 nasal cannula.  Will continue to monitor pt for s/s of respiratory distress

## 2019-10-22 NOTE — PROGRESS NOTES
Pt rec'd in bed, awake.  Denies pain. Monitor room informed RN that pt has a couple 1.5-2 second pauses.  Pt is asymptomatic and V/S stable.  Safety reinforced and call bell placed in reach.  Will continue to monitor pt.

## 2019-10-22 NOTE — HEART FAILURE PROGRAM
Acutely decompensated HFpEF. Please see below for all measures that must be addressed prior to discharge.     Daily nursing: please begin to fill out the HF checklist (pink sheet in hard chart) and use it to guide your daily care.    Discharging nursing: please ensure completeness of the HF checklist and have it co-signed by the charge RN before the patient leaves the hospital.    Thank you, Glenna, Cardiovascular Nurse Navigator, RN, [facility] [phone]    HF Measures:  1. Documentation of LV systolic function (echo or cath) PTA, during this hospitalization, or plan to assess post discharge or reason for not assessing documented.  2. ACE-I, ARNI or ARB prescribed on discharge for LVEF <40%  3. For HF patients with LVEF less than or equal to 40% evidence based beta blocker must be prescribed upon discharge one of the following: carvedilol, bisoprolol, Toprol XL  4. For EF less than or equal to 35% aldosterone blockade prescribed upon discharge  5. Nutrition consult for diet education  6. HF education documented daily  7. Screening for and administering immunizations as long as no contraindications: Pneumonia and Influenza  8. Written discharge instructions include:  ? Daily weights  ? Record weight on tracker  ? Bring tracker to appointments  ? Call MD for weight gain of 3lb /day or 5lb/week  ? HF medication teaching  ? Low sodium diet  ? Follow up appointment within seven calendar days of d/c must include: date, time and location  ? Activity  ? Worsening symptoms  What if any of the above HF measures are contraindicated?  ? Request that the discharging provider document the medication/intervention and the contraindication specifically in a progress note  ? For example: “no CHF meds due to hypotension” is not enough. It needs to say: “No ACE-I, ARNI, ARB due to hypotension”; “No Beta Blockade due to bradycardia”…

## 2019-10-22 NOTE — PROGRESS NOTES
Hospital Medicine Daily Progress Note    Date of Service:  Code Status: Full Code     Chief Complaint  Chief Complaint   Patient presents with   • Syncope   • Chest Pain       Consultants/Specialty: Cardiology - signed off Disposition: Remain IP     Hospital Course     ER and Admission Day course  54 y.o. male who presented 10/19/2019 with history of orthostatic hypotension recurrent syncope and A. fib presented to the emergency room for evaluation of syncope.  He has had an extensive evaluation in the past with cardiology and neurology  he was previously on Xarelto but this was discontinued due to his recurrent syncope and increased risk of bleeding.  He has been maintained on Florinef Midodrin and pyridostigmine.  He reports that this morning while getting up to go take his medications he felt lightheaded and had a syncopal episode.  He regained consciousness spontaneously.  He denies any chest pain.  He was transferred to the emergency room where he was noted to be in A. fib with RVR.  I discussed with Dr. Sosa  and recommended giving 1 dose of IV Cardizem 25 mg with good response.  Patient was seen in the past by Dr. Black but at that time the plan was to discontinue his amiodarone and evaluate for possible ablation.  He never followed up with cardiology. He has had more syncopal spells despite the pyridostigmine, florinef, and midodrine. He came in with another syncopal spell after getting up. Found to be in AF with slight RVR here, rates better with IV dilt. Still on quarter dose amiodarone.     10/20 increased metoprolol to 100mg bid per card recommendations to titirate bb.  Discontinued amio, not candidate for anti-coagulation, not candidate for ablation.  Cardiology signed off with their recommendations.  Agree with their plan. But patient is profoundly fluid overloaded interstitially.  Starting lasix 40mg iv bid.  Patien was consumting >80oz of soda daily and probably >128oz of total fluid daily up until  recently when he quit the soda.    10/21 - 3L off with diruesis, but patient was drinking a lot of water while in bed.  Discussed with him to not do this.  Stopped metoprolol 100mg. He never received 100mg dosing as he was stable, but then developed some tachycardia again that was really responsive to 5mg lopressor, started back 12.5 mg bid metoprolol.       Interval Problem Update  Patient was seen and evaluated today for syncope and chest pain      Review of Systems  Review of Systems   Constitutional: Negative for fever and malaise/fatigue.   Respiratory: Positive for shortness of breath. Negative for cough.    Cardiovascular: Positive for orthopnea and leg swelling. Negative for chest pain.   Skin: Negative for itching and rash.   Psychiatric/Behavioral: Negative for depression. The patient is not nervous/anxious.     Physical Exam  Physical Exam   Constitutional: He is oriented to person, place, and time. No distress.   Pulmonary/Chest: No respiratory distress.   Crackles bilaterally   Abdominal: Soft. He exhibits no distension.   Musculoskeletal: He exhibits edema.   Neurological: He is alert and oriented to person, place, and time. Coordination normal.   Skin: Skin is warm and dry. He is not diaphoretic. No erythema.   Very watery hydrated adiposity, his leg edema rests on the bed in an almost gum-drop like manner consistent with water saturation behavior, not fat   Psychiatric: He has a normal mood and affect. His behavior is normal. Judgment and thought content normal.   Nursing note and vitals reviewed.       I/Os    Intake/Output Summary (Last 24 hours) at 10/22/2019 0556  Last data filed at 10/21/2019 1400  Gross per 24 hour   Intake 480 ml   Output 730 ml   Net -250 ml    Vital Signs  Temp:  [35.9 °C (96.7 °F)-36.6 °C (97.9 °F)] 36.2 °C (97.2 °F)  Pulse:  [] 82  Resp:  [15-18] 18  BP: (108-134)/(70-95) 112/72  SpO2:  [93 %-97 %] 93 %     Laboratory  Results from last 7 days   Lab Units  10/20/19  0334 10/19/19  0830   WBC 1501 K/uL 8.8 7.9   HGB 1503 g/dL 16.1 16.9   HCT 1504 % 50.4 51.1   PLATELET COUNT 1518 K/uL 179 186    Results from last 7 days   Lab Units 10/20/19  0334 10/19/19  0830   SODIUM 101 mmol/L 136 135   POTASSIUM 102 mmol/L 3.6 4.2   CHLORIDE 103 mmol/L 98 98   CO2 104 mmol/L 27 27   ANION GAP ANION  11.0 10.0    mg/dL 35* 40*   CREATININE 109 mg/dL 1.04 1.25   CALCIUM 105 mg/dL 9.7 10.1   GLUCOSE 112 mg/dL 148* 161*   IF PEEWEE AMER 191305 mL/min/1.73 m 2 >60 >60   IF NON AFRICAN AMER 109GFRB mL/min/1.73 m 2 >60 60   MAGNESIUM 561 mg/dL  --  2.0           Medications    Scheduled medications:  metoprolol 12.5 mg Oral TWICE DAILY   nystatin  Topical BID   furosemide 40 mg Intravenous BID DIURETIC   pyridostigmine 90 mg Oral TID   aspirin 81 mg Oral DAILY   cyanocobalamin 1,000 mcg Oral DAILY   fludrocortisone 0.2 mg Oral QAM   FLUoxetine 60 mg Oral DAILY   magnesium oxide 400 mg Oral DAILY   midodrine 10 mg Oral TID   pravastatin 20 mg Oral DAILY   risperiDONE 3 mg Oral Q EVENING   sodium chloride 1 g Oral TID WITH MEALS   senna-docusate 2 Tab Oral BID   enoxaparin 40 mg Subcutaneous DAILY        Medications were reviewed today.      Assessment/Plan  * Chronic atrial fibrillation- (present on admission)  Assessment & Plan  10/21- developed tachycardia again. Responsive to 5mg iv lopressor, started 12.5 mg bid metoprolol  10/20 - cardiology signed off in the ER.  His atrial fibrillation is related to his vascular distension from fluid overload.  10/19 - adm - Will start patient on metoprolol and titrate as tolerated to the lowest dose for rate control. We will ask for cardiology input this patient has not followed up with cardiology since his last visit. Given his recurrent syncope he is at high risk for bleeding so he has not been maintained on anticoagulation given this fact I doubt he will be a candidate for ablation or cardioversion    Orthostatic hypotension- (present on  admission)  Assessment & Plan  10/21 - continued diuresis.  Developed tachycardia, continued diuresis, responded well to 5mg iv lopressor, start 12.5mg metoprolol bid, which can also cause orthostatic hypotension.  Again... Very high degree of suspicion that patient's profound volume overload is one of the root causes of all of this.  Would love to give this patient 10mg tolvaptan one time and see how much water would come off him but it's not available.  His free water interstitially is profound. Augmentation of lasix with zaroxolyn could be quite effective, but eventually the patient will develop true body sodium depletion.  This may also not necessarily be the case as he's been on a 3g daily salt diet as prescribed, but likely high daily sodium intake with his dietary choices.  Discontinued sodium tabs regardless.  10/20 - Multi-factorial, suspicion for prescribing cascade.  Patient is on numerous competing meds.  Patient is also profoundly fluid overload with hydrated adiposity that would make the healthiest of hearts have to adjust to mobilize his lower extremity venous blood supply.  Lasix 40mg iv bid started to begin diuresing this fluid off as first step.  Decreased his mestinon down to 60mg dose from 90mg dose  10/19 - adm - Thought to be secondary to autonomic dysfunction. Continue salt tablets Florinef Midodrin and Mestinon      Venous stasis dermatitis of both lower extremities- (present on admission)  Assessment & Plan  10/21 - Continue  10/20 - profoundly fluid overloaded interstitially.  Have to get this patient's water off.  Correcting his profound interstitial total body water would begin to restore his venous drainage, which would then begin to restore his lymphatic drainage.  10/19 - adm - Support stocking as tolerated    Polypharmacy- (present on admission)  Assessment & Plan  10/21 - stable  10/20 - Patient is on numerous conflicting medications. By definition, polypharmacy is 5 or more  medications, patient is on more than 5 medications that are all cholinergic and competing effect medications.  Cannot rule out that this entire sequence is a prescribing cascade.  Will start to wean medications with decreasing his mestinon down to 60mg from 90mg.  Discussion with pharmacist, who has worked with this patient during previous admissions where his orthostatic hypotension has been quite resistent.  Would continue to wean vasoactive medications aggressively while inpatient.    aspirin (ASA) 81 MG Chew Tab chewable tablet Take 1 Tab by mouth every day. Jose An M.D. Reordered   amiodarone (PACERONE) 100 MG tablet Take 0.5 Tabs by mouth every day. Jose An M.D. Not Ordered   FLUoxetine (PROZAC) 20 MG Cap Take 3 Caps by mouth every day. Jose An M.D. Reordered   pravastatin (PRAVACHOL) 20 MG Tab Take 1 Tab by mouth every day. Jose An M.D. Reordered   risperiDONE (RISPERDAL) 3 MG Tab Take 1 Tab by mouth every evening. Jose An M.D. Reordered   fludrocortisone (FLORINEF) 0.1 MG Tab Take 2 Tabs by mouth every morning. Jose An M.D. Reordered   furosemide (LASIX) 20 MG Tab Take 0.5 Tabs by mouth 1 time daily as needed (Edema, Shortness of breath or weight gain.). Jose An M.D. Not Ordered   cyanocobalamin (VITAMIN B12) 1000 MCG Tab Take 1 Tab by mouth every day. Jose An M.D. Reordered   magnesium oxide (MAG-OX) 400 (241.3 Mg) MG Tab tablet Take 1 Tab by mouth every day. Jose An M.D. Reordered   midodrine (PROAMATINE) 10 MG tablet Take 1 Tab by mouth 3 times a day. Jose An M.D. Reordered   pyridostigmine (MESTINON) 60 MG Tab Take 2 Tabs by mouth 3 times a day. Jose An M.D. Not Ordered   sodium chloride (SALT) 1 GM Tab Take 1 Tab by mouth 3 times a day, with meals. Jose An M.D. Reordered           Pre-diabetes- (present on admission)  Assessment & Plan  10/21 - A1c  not back yet, confirmed order, awaiting.  Suspect it will be down.   Of note, patient's cholesterol numbers are not consistent with someone who is simply obese, even with statin therapy.  10/20 - stable, a1c pending.  Patient does have a trend of improvement over the last year.  10/19 - adm - Check HbA1c  Glycohemoglobin   Date Value Ref Range Status   12/28/2018 5.9 (H) 0.0 - 5.6 % Final     Comment:   10/23/2018 6.3 (H) 0.0 - 5.6 % Final     Comment:   07/09/2018 6.5 (H) 0.0 - 5.6 % Final     Comment:     Cholesterol,Tot   Date Value Ref Range Status   11/22/2018 104 100 - 199 mg/dL Final   07/10/2018 119 100 - 199 mg/dL Final     Triglycerides   Date Value Ref Range Status   11/22/2018 87 0 - 149 mg/dL Final   07/10/2018 136 0 - 149 mg/dL Final     HDL   Date Value Ref Range Status   11/22/2018 27 (A) >=40 mg/dL Final   07/10/2018 24 (A) >=40 mg/dL Final     LDL   Date Value Ref Range Status   11/22/2018 60 <100 mg/dL Final   07/10/2018 68 <100 mg/dL Final         Nonischemic cardiomyopathy (HCC)- (present on admission)  Assessment & Plan  10/21 - stable  10/20 - stable - restarted iv lasix 40mg bid.  10/19 - adm - Thought to be secondary to tachycardia. Last echocardiogram EF normalized. He received 1 dose of IV Lasix in the emergency room we will hold off on further diuresis given his history of orthostatic hypotension and syncope. Monitor fluid status.  Results from last 7 days   Lab Units 10/19/19  1030 10/19/19  0830   TROPONIN T [id number]  ng/L 39* 37*            VTE prophylaxis: lovenox    Imaging  CT-CTA CHEST PULMONARY ARTERY W/ RECONS   Final Result      1.  No CT evidence for pulmonary emboli.   2.  No pneumonia or pneumothorax.   3.  Minimal bibasilar atelectasis.            DX-CHEST-PORTABLE (1 VIEW)   Final Result      1.  Mild pulmonary vascular congestion.   2.  No lobar pneumonia or overt pulmonary edema.

## 2019-10-23 LAB
ANION GAP SERPL CALC-SCNC: 10 MMOL/L (ref 0–11.9)
BASOPHILS # BLD AUTO: 0.5 % (ref 0–1.8)
BASOPHILS # BLD: 0.04 K/UL (ref 0–0.12)
BUN SERPL-MCNC: 40 MG/DL (ref 8–22)
CALCIUM SERPL-MCNC: 9.2 MG/DL (ref 8.5–10.5)
CHLORIDE SERPL-SCNC: 97 MMOL/L (ref 96–112)
CO2 SERPL-SCNC: 29 MMOL/L (ref 20–33)
CREAT SERPL-MCNC: 1.01 MG/DL (ref 0.5–1.4)
EOSINOPHIL # BLD AUTO: 0.36 K/UL (ref 0–0.51)
EOSINOPHIL NFR BLD: 4.1 % (ref 0–6.9)
ERYTHROCYTE [DISTWIDTH] IN BLOOD BY AUTOMATED COUNT: 38.1 FL (ref 35.9–50)
GLUCOSE SERPL-MCNC: 147 MG/DL (ref 65–99)
HCT VFR BLD AUTO: 47.9 % (ref 42–52)
HGB BLD-MCNC: 16.3 G/DL (ref 14–18)
IMM GRANULOCYTES # BLD AUTO: 0.04 K/UL (ref 0–0.11)
IMM GRANULOCYTES NFR BLD AUTO: 0.5 % (ref 0–0.9)
LYMPHOCYTES # BLD AUTO: 2.61 K/UL (ref 1–4.8)
LYMPHOCYTES NFR BLD: 29.8 % (ref 22–41)
MAGNESIUM SERPL-MCNC: 1.9 MG/DL (ref 1.5–2.5)
MCH RBC QN AUTO: 28.7 PG (ref 27–33)
MCHC RBC AUTO-ENTMCNC: 34 G/DL (ref 33.7–35.3)
MCV RBC AUTO: 84.5 FL (ref 81.4–97.8)
MONOCYTES # BLD AUTO: 0.84 K/UL (ref 0–0.85)
MONOCYTES NFR BLD AUTO: 9.6 % (ref 0–13.4)
NEUTROPHILS # BLD AUTO: 4.87 K/UL (ref 1.82–7.42)
NEUTROPHILS NFR BLD: 55.5 % (ref 44–72)
NRBC # BLD AUTO: 0 K/UL
NRBC BLD-RTO: 0 /100 WBC
PLATELET # BLD AUTO: 158 K/UL (ref 164–446)
PMV BLD AUTO: 11.4 FL (ref 9–12.9)
POTASSIUM SERPL-SCNC: 3.1 MMOL/L (ref 3.6–5.5)
RBC # BLD AUTO: 5.67 M/UL (ref 4.7–6.1)
SODIUM SERPL-SCNC: 136 MMOL/L (ref 135–145)
WBC # BLD AUTO: 8.8 K/UL (ref 4.8–10.8)

## 2019-10-23 PROCEDURE — 700102 HCHG RX REV CODE 250 W/ 637 OVERRIDE(OP): Performed by: STUDENT IN AN ORGANIZED HEALTH CARE EDUCATION/TRAINING PROGRAM

## 2019-10-23 PROCEDURE — 306318 STOCKING THIGH HI XXLRG REG: Performed by: INTERNAL MEDICINE

## 2019-10-23 PROCEDURE — 36415 COLL VENOUS BLD VENIPUNCTURE: CPT

## 2019-10-23 PROCEDURE — 83735 ASSAY OF MAGNESIUM: CPT

## 2019-10-23 PROCEDURE — A9270 NON-COVERED ITEM OR SERVICE: HCPCS | Performed by: INTERNAL MEDICINE

## 2019-10-23 PROCEDURE — 80048 BASIC METABOLIC PNL TOTAL CA: CPT

## 2019-10-23 PROCEDURE — 700102 HCHG RX REV CODE 250 W/ 637 OVERRIDE(OP): Performed by: HOSPITALIST

## 2019-10-23 PROCEDURE — 85025 COMPLETE CBC W/AUTO DIFF WBC: CPT

## 2019-10-23 PROCEDURE — A9270 NON-COVERED ITEM OR SERVICE: HCPCS | Performed by: HOSPITALIST

## 2019-10-23 PROCEDURE — 700111 HCHG RX REV CODE 636 W/ 250 OVERRIDE (IP): Performed by: HOSPITALIST

## 2019-10-23 PROCEDURE — A9270 NON-COVERED ITEM OR SERVICE: HCPCS | Performed by: STUDENT IN AN ORGANIZED HEALTH CARE EDUCATION/TRAINING PROGRAM

## 2019-10-23 PROCEDURE — G0378 HOSPITAL OBSERVATION PER HR: HCPCS

## 2019-10-23 PROCEDURE — 96372 THER/PROPH/DIAG INJ SC/IM: CPT

## 2019-10-23 PROCEDURE — 306311 ANTI-EMBOLISM STOCKINGS XXLRG LNG: Performed by: INTERNAL MEDICINE

## 2019-10-23 PROCEDURE — 700102 HCHG RX REV CODE 250 W/ 637 OVERRIDE(OP): Performed by: INTERNAL MEDICINE

## 2019-10-23 PROCEDURE — 99225 PR SUBSEQUENT OBSERVATION CARE,LEVEL II: CPT | Performed by: INTERNAL MEDICINE

## 2019-10-23 PROCEDURE — 97161 PT EVAL LOW COMPLEX 20 MIN: CPT

## 2019-10-23 RX ORDER — POTASSIUM CHLORIDE 20 MEQ/1
20 TABLET, EXTENDED RELEASE ORAL ONCE
Status: COMPLETED | OUTPATIENT
Start: 2019-10-23 | End: 2019-10-23

## 2019-10-23 RX ORDER — POTASSIUM CHLORIDE 20 MEQ/1
20 TABLET, EXTENDED RELEASE ORAL 2 TIMES DAILY
Status: COMPLETED | OUTPATIENT
Start: 2019-10-23 | End: 2019-10-24

## 2019-10-23 RX ADMIN — NYSTATIN: 100000 POWDER TOPICAL at 06:09

## 2019-10-23 RX ADMIN — PYRIDOSTIGMINE BROMIDE 90 MG: 60 TABLET ORAL at 06:08

## 2019-10-23 RX ADMIN — MAGNESIUM GLUCONATE 500 MG ORAL TABLET 400 MG: 500 TABLET ORAL at 06:06

## 2019-10-23 RX ADMIN — PYRIDOSTIGMINE BROMIDE 90 MG: 60 TABLET ORAL at 11:34

## 2019-10-23 RX ADMIN — POTASSIUM CHLORIDE 20 MEQ: 1500 TABLET, EXTENDED RELEASE ORAL at 06:13

## 2019-10-23 RX ADMIN — ASPIRIN 81 MG 81 MG: 81 TABLET ORAL at 06:07

## 2019-10-23 RX ADMIN — FLUDROCORTISONE ACETATE 0.2 MG: 0.1 TABLET ORAL at 06:07

## 2019-10-23 RX ADMIN — MIDODRINE HYDROCHLORIDE 10 MG: 5 TABLET ORAL at 06:07

## 2019-10-23 RX ADMIN — ENOXAPARIN SODIUM 40 MG: 100 INJECTION SUBCUTANEOUS at 06:06

## 2019-10-23 RX ADMIN — METOPROLOL TARTRATE 12.5 MG: 25 TABLET, FILM COATED ORAL at 17:15

## 2019-10-23 RX ADMIN — RISPERIDONE 3 MG: 1 TABLET, FILM COATED ORAL at 17:15

## 2019-10-23 RX ADMIN — MIDODRINE HYDROCHLORIDE 10 MG: 5 TABLET ORAL at 17:16

## 2019-10-23 RX ADMIN — PRAVASTATIN SODIUM 20 MG: 20 TABLET ORAL at 06:07

## 2019-10-23 RX ADMIN — POTASSIUM CHLORIDE 20 MEQ: 1500 TABLET, EXTENDED RELEASE ORAL at 11:34

## 2019-10-23 RX ADMIN — FLUOXETINE HYDROCHLORIDE 60 MG: 20 CAPSULE ORAL at 06:07

## 2019-10-23 RX ADMIN — NYSTATIN: 100000 POWDER TOPICAL at 17:16

## 2019-10-23 RX ADMIN — POTASSIUM CHLORIDE 20 MEQ: 1500 TABLET, EXTENDED RELEASE ORAL at 17:16

## 2019-10-23 RX ADMIN — CYANOCOBALAMIN TAB 500 MCG 1000 MCG: 500 TAB at 06:07

## 2019-10-23 RX ADMIN — MIDODRINE HYDROCHLORIDE 10 MG: 5 TABLET ORAL at 11:34

## 2019-10-23 RX ADMIN — METOPROLOL TARTRATE 12.5 MG: 25 TABLET, FILM COATED ORAL at 06:07

## 2019-10-23 RX ADMIN — PYRIDOSTIGMINE BROMIDE 90 MG: 60 TABLET ORAL at 17:16

## 2019-10-23 ASSESSMENT — ENCOUNTER SYMPTOMS
DEPRESSION: 0
FEVER: 0
DIZZINESS: 1
NERVOUS/ANXIOUS: 0
SHORTNESS OF BREATH: 1
ORTHOPNEA: 0
COUGH: 0

## 2019-10-23 ASSESSMENT — COGNITIVE AND FUNCTIONAL STATUS - GENERAL
SUGGESTED CMS G CODE MODIFIER MOBILITY: CH
MOBILITY SCORE: 24

## 2019-10-23 ASSESSMENT — GAIT ASSESSMENTS
GAIT LEVEL OF ASSIST: STAND BY ASSIST
DEVIATION: BRADYKINETIC
DISTANCE (FEET): 75

## 2019-10-23 NOTE — PROGRESS NOTES
Abdominal binders X2 size large have been delivered to pt's bedside for staff to apply and fit to pt when ready.

## 2019-10-23 NOTE — PROGRESS NOTES
Hospital Medicine Daily Progress Note    Date of Service:  Code Status: Full Code     Chief Complaint  Chief Complaint   Patient presents with   • Syncope   • Chest Pain       Consultants/Specialty: Cardiology - signed off Disposition: Remain IP     Hospital Course     ER and Admission Day course  54 y.o. male who presented 10/19/2019 with history of orthostatic hypotension recurrent syncope and A. fib presented to the emergency room for evaluation of syncope.  He has had an extensive evaluation in the past with cardiology and neurology  he was previously on Xarelto but this was discontinued due to his recurrent syncope and increased risk of bleeding.  He has been maintained on Florinef Midodrin and pyridostigmine.  He reports that this morning while getting up to go take his medications he felt lightheaded and had a syncopal episode.  He regained consciousness spontaneously.  He denies any chest pain.  He was transferred to the emergency room where he was noted to be in A. fib with RVR.  I discussed with Dr. Sosa  and recommended giving 1 dose of IV Cardizem 25 mg with good response.  Patient was seen in the past by Dr. Black but at that time the plan was to discontinue his amiodarone and evaluate for possible ablation.  He never followed up with cardiology. He has had more syncopal spells despite the pyridostigmine, florinef, and midodrine. He came in with another syncopal spell after getting up. Found to be in AF with slight RVR here, rates better with IV dilt. Still on quarter dose amiodarone.     Orthostasis-no changes today. Gets very dizzy when getting out of bed. Trying additional maneuvers today. TELE showed persistent A fib HR 73-95. Rare PVC's.        Interval Problem Update  Patient was seen and evaluated today for syncope and chest pain      Review of Systems  Review of Systems   Constitutional: Negative for fever and malaise/fatigue.   Respiratory: Positive for shortness of breath. Negative for cough.     Cardiovascular: Negative for chest pain, orthopnea and leg swelling.   Skin: Negative for itching and rash.   Neurological: Positive for dizziness (when standing up or moving).   Psychiatric/Behavioral: Negative for depression. The patient is not nervous/anxious.     Physical Exam  Physical Exam   Constitutional: He is oriented to person, place, and time. No distress.   HENT:   Mouth/Throat: No oropharyngeal exudate.   Pulmonary/Chest: No stridor. He has no wheezes. He has no rales.   Abdominal: Soft. He exhibits no distension.   Musculoskeletal: He exhibits edema (Unchanged today, scant).   Neurological: He is alert and oriented to person, place, and time. No cranial nerve deficit. Coordination normal.   Skin: Skin is warm and dry. He is not diaphoretic. No erythema.   Chronic venous stasis changes B/L LE's   Psychiatric: He has a normal mood and affect.   Nursing note and vitals reviewed.       I/Os    Intake/Output Summary (Last 24 hours) at 10/23/2019 1410  Last data filed at 10/23/2019 0800  Gross per 24 hour   Intake --   Output 500 ml   Net -500 ml    Vital Signs  Temp:  [35.9 °C (96.6 °F)-36.8 °C (98.3 °F)] 36.2 °C (97.1 °F)  Pulse:  [] 87  Resp:  [16-18] 18  BP: ()/(40-97) 139/95  SpO2:  [94 %-96 %] 95 %     Laboratory  Results from last 7 days   Lab Units 10/23/19  0328 10/20/19  0334   WBC 1501 K/uL 8.8 8.8   HGB 1503 g/dL 16.3 16.1   HCT 1504 % 47.9 50.4   PLATELET COUNT 1518 K/uL 158* 179    Results from last 7 days   Lab Units 10/23/19  0328 10/22/19  0823   SODIUM 101 mmol/L 136 138   POTASSIUM 102 mmol/L 3.1* 3.3*   CHLORIDE 103 mmol/L 97 95*   CO2 104 mmol/L 29 31   ANION GAP ANION  10.0 12.0*    mg/dL 40* 36*   CREATININE 109 mg/dL 1.01 1.17   CALCIUM 105 mg/dL 9.2 9.6   GLUCOSE 112 mg/dL 147* 166*   IF PEEWEE AMER 974017 mL/min/1.73 m 2 >60 >60   IF NON AFRICAN AMER 109GFRB mL/min/1.73 m 2 >60 >60   MAGNESIUM 561 mg/dL 1.9 2.0           Medications    Scheduled  medications:  potassium chloride SA 20 mEq Oral BID   metoprolol 12.5 mg Oral TWICE DAILY   nystatin  Topical BID   pyridostigmine 90 mg Oral TID   aspirin 81 mg Oral DAILY   cyanocobalamin 1,000 mcg Oral DAILY   fludrocortisone 0.2 mg Oral QAM   FLUoxetine 60 mg Oral DAILY   magnesium oxide 400 mg Oral DAILY   midodrine 10 mg Oral TID   pravastatin 20 mg Oral DAILY   risperiDONE 3 mg Oral Q EVENING   senna-docusate 2 Tab Oral BID   enoxaparin 40 mg Subcutaneous DAILY        Medications were reviewed today.      Assessment/Plan  * Chronic atrial fibrillation- (present on admission)  Assessment & Plan  -HR controlled, continue metoprolol 12.5 mg BID  10/19 - adm - Will start patient on metoprolol and titrate as tolerated to the lowest dose for rate control. We will ask for cardiology input this patient has not followed up with cardiology since his last visit. Given his recurrent syncope he is at high risk for bleeding so he has not been maintained on anticoagulation given this fact I doubt he will be a candidate for ablation or cardioversion    Orthostatic hypotension- (present on admission)  Assessment & Plan  -gentle diuresis has to be stopped as patient becomes too symptomatic  -start abdominal binder and B/L compression stockings thigh high  -continue outpatient medications  -unclear etiology  -thoroughly worked up in the past  -check BID orthostats      Venous stasis dermatitis of both lower extremities- (present on admission)  Assessment & Plan  ordered compression stockings    Polypharmacy- (present on admission)  Assessment & Plan  -polypharmacy in this particular clinical situation is necessary as patient has severe refractory orthostatic hypotension  -continue current medications and try to wean off what is possible    Pre-diabetes- (present on admission)  Assessment & Plan  -monitor sugars        Nonischemic cardiomyopathy (HCC)- (present on admission)  Assessment & Plan  -volume status is difficult to  determine       VTE prophylaxis: lovenox    Imaging  CT-CTA CHEST PULMONARY ARTERY W/ RECONS   Final Result      1.  No CT evidence for pulmonary emboli.   2.  No pneumonia or pneumothorax.   3.  Minimal bibasilar atelectasis.            DX-CHEST-PORTABLE (1 VIEW)   Final Result      1.  Mild pulmonary vascular congestion.   2.  No lobar pneumonia or overt pulmonary edema.

## 2019-10-23 NOTE — PROGRESS NOTES
Bedside report received. Assumed care of pt. Pt sitting up in bed, A&O X4. No signs of distress, no complaints of pain at this time. Tele box on.Call light and belongings within reach. Bed alarm on, bed locked and in lowest position.

## 2019-10-23 NOTE — THERAPY
"Physical Therapy Evaluation completed.   Bed Mobility:  Supine to Sit: Supervised  Transfers: Sit to Stand: Supervised  Gait: Level Of Assist: Stand by Assist with No Equipment Needed       Plan of Care: Will benefit from Physical Therapy 3 times per week  Discharge Recommendations: Equipment: No Equipment Needed.     See \"Rehab Therapy-Acute\" Patient Summary Report for complete documentation.     Pt was recently admitted for hypotension and syncopal event. Pt presented to PT with fatigue, SOB, and dec activity tolerance. Pt appears to be near his functional baseline with strenght, balance, and gait. However, pt is limited due to poor activity tolerance and fluctuations in vitals. Pt was able to demonstrate SBA to SPV for all functional mobility at this time. Pt demonstrated with BP of 123/88 upon sitting EOB, 119/86 after standing, 134/90 after marching in place, and BP of 154/90 after ambulation from bed to door. Pt demonstrated with increase of HR to 145. Pt demonstrated with increased fatigue and SOB. No hypotension was observed and no reports of dizziness or lighthheadedness were noted. Deferred further ambulation or activity due to concerns of increaseing BP and HR with minimal activity. RN aware of concerns. Pt will continue to benefit from skilled PT while in house to progress activity tolerance. Anticipate pt to d/c home with roommate support once medically clear and pt is able to go up/down 5 steps with no abnormal vital signs. Encouraged pt to begin ambulating from bed to bathroom as able with [facility] staff assist to progress activity tolerance.   "

## 2019-10-23 NOTE — PROGRESS NOTES
Report received from night shift RN, assumed care of pt. Pt A&O4, in no apparent distress, resting comfortably in bed. Plan of care discussed with pt, no questions or needs at this time. Tele box on, rhythm verified. Call light within reach, bed locked and in lowest position. All fall precautions and hourly rounding in place. Will continue to monitor.

## 2019-10-24 LAB
ANION GAP SERPL CALC-SCNC: 12 MMOL/L (ref 0–11.9)
BUN SERPL-MCNC: 37 MG/DL (ref 8–22)
CALCIUM SERPL-MCNC: 9.4 MG/DL (ref 8.5–10.5)
CHLORIDE SERPL-SCNC: 100 MMOL/L (ref 96–112)
CO2 SERPL-SCNC: 26 MMOL/L (ref 20–33)
CREAT SERPL-MCNC: 1 MG/DL (ref 0.5–1.4)
GLUCOSE SERPL-MCNC: 145 MG/DL (ref 65–99)
MAGNESIUM SERPL-MCNC: 2 MG/DL (ref 1.5–2.5)
NT-PROBNP SERPL IA-MCNC: 1397 PG/ML (ref 0–125)
POTASSIUM SERPL-SCNC: 3.6 MMOL/L (ref 3.6–5.5)
SODIUM SERPL-SCNC: 138 MMOL/L (ref 135–145)

## 2019-10-24 PROCEDURE — A9270 NON-COVERED ITEM OR SERVICE: HCPCS | Performed by: STUDENT IN AN ORGANIZED HEALTH CARE EDUCATION/TRAINING PROGRAM

## 2019-10-24 PROCEDURE — G0378 HOSPITAL OBSERVATION PER HR: HCPCS

## 2019-10-24 PROCEDURE — 83735 ASSAY OF MAGNESIUM: CPT

## 2019-10-24 PROCEDURE — 700102 HCHG RX REV CODE 250 W/ 637 OVERRIDE(OP): Performed by: STUDENT IN AN ORGANIZED HEALTH CARE EDUCATION/TRAINING PROGRAM

## 2019-10-24 PROCEDURE — 306311 ANTI-EMBOLISM STOCKINGS XXLRG LNG: Performed by: INTERNAL MEDICINE

## 2019-10-24 PROCEDURE — 97165 OT EVAL LOW COMPLEX 30 MIN: CPT

## 2019-10-24 PROCEDURE — 700102 HCHG RX REV CODE 250 W/ 637 OVERRIDE(OP): Performed by: INTERNAL MEDICINE

## 2019-10-24 PROCEDURE — 83880 ASSAY OF NATRIURETIC PEPTIDE: CPT

## 2019-10-24 PROCEDURE — 36415 COLL VENOUS BLD VENIPUNCTURE: CPT

## 2019-10-24 PROCEDURE — 80048 BASIC METABOLIC PNL TOTAL CA: CPT

## 2019-10-24 PROCEDURE — 96372 THER/PROPH/DIAG INJ SC/IM: CPT

## 2019-10-24 PROCEDURE — 700111 HCHG RX REV CODE 636 W/ 250 OVERRIDE (IP): Performed by: HOSPITALIST

## 2019-10-24 PROCEDURE — 99225 PR SUBSEQUENT OBSERVATION CARE,LEVEL II: CPT | Performed by: INTERNAL MEDICINE

## 2019-10-24 PROCEDURE — 700102 HCHG RX REV CODE 250 W/ 637 OVERRIDE(OP): Performed by: HOSPITALIST

## 2019-10-24 PROCEDURE — A9270 NON-COVERED ITEM OR SERVICE: HCPCS | Performed by: HOSPITALIST

## 2019-10-24 PROCEDURE — A9270 NON-COVERED ITEM OR SERVICE: HCPCS | Performed by: INTERNAL MEDICINE

## 2019-10-24 RX ORDER — MIDODRINE HYDROCHLORIDE 5 MG/1
5 TABLET ORAL 3 TIMES DAILY
Status: DISCONTINUED | OUTPATIENT
Start: 2019-10-24 | End: 2019-10-26 | Stop reason: HOSPADM

## 2019-10-24 RX ADMIN — MIDODRINE HYDROCHLORIDE 5 MG: 5 TABLET ORAL at 17:18

## 2019-10-24 RX ADMIN — RISPERIDONE 3 MG: 1 TABLET, FILM COATED ORAL at 17:18

## 2019-10-24 RX ADMIN — ASPIRIN 81 MG 81 MG: 81 TABLET ORAL at 05:15

## 2019-10-24 RX ADMIN — METOPROLOL TARTRATE 12.5 MG: 25 TABLET, FILM COATED ORAL at 05:16

## 2019-10-24 RX ADMIN — POTASSIUM CHLORIDE 20 MEQ: 1500 TABLET, EXTENDED RELEASE ORAL at 17:18

## 2019-10-24 RX ADMIN — MAGNESIUM GLUCONATE 500 MG ORAL TABLET 400 MG: 500 TABLET ORAL at 05:15

## 2019-10-24 RX ADMIN — PYRIDOSTIGMINE BROMIDE 90 MG: 60 TABLET ORAL at 05:15

## 2019-10-24 RX ADMIN — MIDODRINE HYDROCHLORIDE 10 MG: 5 TABLET ORAL at 05:15

## 2019-10-24 RX ADMIN — POTASSIUM CHLORIDE 20 MEQ: 1500 TABLET, EXTENDED RELEASE ORAL at 05:14

## 2019-10-24 RX ADMIN — MIDODRINE HYDROCHLORIDE 5 MG: 5 TABLET ORAL at 11:33

## 2019-10-24 RX ADMIN — PYRIDOSTIGMINE BROMIDE 90 MG: 60 TABLET ORAL at 17:18

## 2019-10-24 RX ADMIN — METOPROLOL TARTRATE 12.5 MG: 25 TABLET, FILM COATED ORAL at 17:18

## 2019-10-24 RX ADMIN — PYRIDOSTIGMINE BROMIDE 90 MG: 60 TABLET ORAL at 11:33

## 2019-10-24 RX ADMIN — ENOXAPARIN SODIUM 40 MG: 100 INJECTION SUBCUTANEOUS at 05:14

## 2019-10-24 RX ADMIN — FLUDROCORTISONE ACETATE 0.2 MG: 0.1 TABLET ORAL at 05:15

## 2019-10-24 RX ADMIN — FLUOXETINE HYDROCHLORIDE 60 MG: 20 CAPSULE ORAL at 05:15

## 2019-10-24 RX ADMIN — NYSTATIN: 100000 POWDER TOPICAL at 05:17

## 2019-10-24 RX ADMIN — CYANOCOBALAMIN TAB 500 MCG 1000 MCG: 500 TAB at 05:15

## 2019-10-24 RX ADMIN — PRAVASTATIN SODIUM 20 MG: 20 TABLET ORAL at 05:15

## 2019-10-24 RX ADMIN — NYSTATIN: 100000 POWDER TOPICAL at 17:18

## 2019-10-24 ASSESSMENT — COGNITIVE AND FUNCTIONAL STATUS - GENERAL
DAILY ACTIVITIY SCORE: 19
TOILETING: A LITTLE
DRESSING REGULAR UPPER BODY CLOTHING: A LITTLE
DRESSING REGULAR LOWER BODY CLOTHING: A LITTLE
PERSONAL GROOMING: A LITTLE
HELP NEEDED FOR BATHING: A LITTLE
SUGGESTED CMS G CODE MODIFIER DAILY ACTIVITY: CK

## 2019-10-24 ASSESSMENT — ENCOUNTER SYMPTOMS
COUGH: 0
DEPRESSION: 0
DIZZINESS: 1
ORTHOPNEA: 0
SHORTNESS OF BREATH: 0
CHILLS: 0
NERVOUS/ANXIOUS: 0
FEVER: 0

## 2019-10-24 ASSESSMENT — ACTIVITIES OF DAILY LIVING (ADL): TOILETING: INDEPENDENT

## 2019-10-24 NOTE — PROGRESS NOTES
Hospital Medicine Daily Progress Note    Date of Service:  Code Status: Full Code     Chief Complaint  Chief Complaint   Patient presents with   • Syncope   • Chest Pain       Consultants/Specialty: Cardiology - signed off Disposition: Remain IP     Hospital Course     ER and Admission Day course  54 y.o. male who presented 10/19/2019 with history of orthostatic hypotension recurrent syncope and A. fib presented to the emergency room for evaluation of syncope.  He has had an extensive evaluation in the past with cardiology and neurology  he was previously on Xarelto but this was discontinued due to his recurrent syncope and increased risk of bleeding.  He has been maintained on Florinef Midodrin and pyridostigmine.  He reports that this morning while getting up to go take his medications he felt lightheaded and had a syncopal episode.  He regained consciousness spontaneously.  He denies any chest pain.  He was transferred to the emergency room where he was noted to be in A. fib with RVR.  I discussed with Dr. Sosa  and recommended giving 1 dose of IV Cardizem 25 mg with good response.  Patient was seen in the past by Dr. Black but at that time the plan was to discontinue his amiodarone and evaluate for possible ablation.  He never followed up with cardiology. He has had more syncopal spells despite the pyridostigmine, florinef, and midodrine. He came in with another syncopal spell after getting up. Found to be in AF with slight RVR here, rates better with IV dilt. Still on quarter dose amiodarone.     Orthostasis-was doing better yesterday with PT and then worse this AM.        Interval Problem Update  Patient was seen and evaluated today for syncope and chest pain      Review of Systems  Review of Systems   Constitutional: Negative for chills and fever.   Respiratory: Negative for cough and shortness of breath.    Cardiovascular: Negative for chest pain, orthopnea and leg swelling.   Skin: Negative for rash.    Neurological: Positive for dizziness (unchanged).   Psychiatric/Behavioral: Negative for depression. The patient is not nervous/anxious.     Physical Exam  Physical Exam   Constitutional: He is oriented to person, place, and time. No distress.   HENT:   Mouth/Throat: No oropharyngeal exudate.   Pulmonary/Chest: No stridor. He has no wheezes. He has no rales.   Abdominal: Soft. He exhibits no distension.   AB binder in place   Musculoskeletal: He exhibits edema (Unchanged today, scant).   Neurological: He is alert and oriented to person, place, and time. No cranial nerve deficit. Coordination normal.   Skin: Skin is warm and dry. He is not diaphoretic. No erythema.   Chronic venous stasis changes B/L LE's   Psychiatric: He has a normal mood and affect.   Nursing note and vitals reviewed.       I/Os    Intake/Output Summary (Last 24 hours) at 10/24/2019 1337  Last data filed at 10/23/2019 1720  Gross per 24 hour   Intake 240 ml   Output 350 ml   Net -110 ml    Vital Signs  Temp:  [35.9 °C (96.6 °F)-36.8 °C (98.3 °F)] 36.8 °C (98.3 °F)  Pulse:  [] 97  Resp:  [15-18] 15  BP: (115-163)/(76-97) 115/76  SpO2:  [92 %-97 %] 94 %     Laboratory  Results from last 7 days   Lab Units 10/23/19  0328 10/20/19  0334   WBC 1501 K/uL 8.8 8.8   HGB 1503 g/dL 16.3 16.1   HCT 1504 % 47.9 50.4   PLATELET COUNT 1518 K/uL 158* 179    Results from last 7 days   Lab Units 10/24/19  0322 10/23/19  0328   SODIUM 101 mmol/L 138 136   POTASSIUM 102 mmol/L 3.6 3.1*   CHLORIDE 103 mmol/L 100 97   CO2 104 mmol/L 26 29   ANION GAP ANION  12.0* 10.0    mg/dL 37* 40*   CREATININE 109 mg/dL 1.00 1.01   CALCIUM 105 mg/dL 9.4 9.2   GLUCOSE 112 mg/dL 145* 147*   IF PEEWEE AMER 825919 mL/min/1.73 m 2 >60 >60   IF NON AFRICAN AMER 109GFRB mL/min/1.73 m 2 >60 >60   MAGNESIUM 561 mg/dL 2.0 1.9           Medications    Scheduled medications:  midodrine 5 mg Oral TID   potassium chloride SA 20 mEq Oral BID   metoprolol 12.5 mg Oral TWICE DAILY    nystatin  Topical BID   pyridostigmine 90 mg Oral TID   aspirin 81 mg Oral DAILY   cyanocobalamin 1,000 mcg Oral DAILY   fludrocortisone 0.2 mg Oral QAM   FLUoxetine 60 mg Oral DAILY   magnesium oxide 400 mg Oral DAILY   pravastatin 20 mg Oral DAILY   risperiDONE 3 mg Oral Q EVENING   senna-docusate 2 Tab Oral BID   enoxaparin 40 mg Subcutaneous DAILY        Medications were reviewed today.      Assessment/Plan  * Chronic atrial fibrillation- (present on admission)  Assessment & Plan  -HR controlled, continue metoprolol 12.5 mg BID  10/19 - adm - Will start patient on metoprolol and titrate as tolerated to the lowest dose for rate control. We will ask for cardiology input this patient has not followed up with cardiology since his last visit. Given his recurrent syncope he is at high risk for bleeding so he has not been maintained on anticoagulation given this fact I doubt he will be a candidate for ablation or cardioversion    Orthostatic hypotension- (present on admission)  Assessment & Plan  -gentle diuresis has to be stopped as patient becomes too symptomatic  -start abdominal binder and B/L compression stockings thigh high-seemed to be helping yesterday but no worse again today  -monitor  -continue outpatient medications  -unclear etiology  -thoroughly worked up in the past  -check BID orthostats      Venous stasis dermatitis of both lower extremities- (present on admission)  Assessment & Plan  ordered compression stockings    Polypharmacy- (present on admission)  Assessment & Plan  -polypharmacy in this particular clinical situation is necessary as patient has severe refractory orthostatic hypotension  -continue current medications and try to wean off what is possible    Pre-diabetes- (present on admission)  Assessment & Plan  -monitor sugars        Nonischemic cardiomyopathy (HCC)- (present on admission)  Assessment & Plan  -volume status is difficult to determine       VTE prophylaxis:  [facility]    Imaging  CT-CTA CHEST PULMONARY ARTERY W/ RECONS   Final Result      1.  No CT evidence for pulmonary emboli.   2.  No pneumonia or pneumothorax.   3.  Minimal bibasilar atelectasis.            DX-CHEST-PORTABLE (1 VIEW)   Final Result      1.  Mild pulmonary vascular congestion.   2.  No lobar pneumonia or overt pulmonary edema.

## 2019-10-24 NOTE — DISCHARGE PLANNING
"SW talked with patient today. Patient reports he is feeling \"bummed out\" because his health is not doing well. SW informed him that physician doesn't want to D/C him, because he is not medically ready. Encouraged him to be patient, body may be slow to respond.  SW discussed non-compliance with patient. Patient denied this, stated, \"I have been taking all my medications for 2 months.\" SW encouraged him to continue to follow up with doctors once D/C, or else he has a chance to return to hospital.   Patient thanked this worker, reported he will be patient.   "

## 2019-10-24 NOTE — THERAPY
"Occupational Therapy Evaluation completed.   Functional Status: SPV LB dressing, SPV seated grooming, SPV stand pivot to chair    Plan of Care: Will benefit from Occupational Therapy 3 times per week  Discharge Recommendations:  Equipment: No Equipment Needed. Post-acute therapy: anticipate that when blood pressure is managed pt will be functionally capable to DC home    See \"Rehab Therapy-Acute\" Patient Summary Report for complete documentation.    Pt is a 55 y/o male who presents to acute due to hypotension and syncopal event. Session primarily limited by orthostatic hypotension. Pt donned abdominal binder w/ min A. Sitting BP was 133/97, standing; 85/49, sitting; 124/83. Pt asymptomatic in sitting and performed standing pivot from EOB to chair. RN notified of findings. Anticipate as blood pressure is managed pt will be functionally capable to DC home.     "

## 2019-10-24 NOTE — CARE PLAN
Problem: Venous Thromboembolism (VTW)/Deep Vein Thrombosis (DVT) Prevention:  Goal: Patient will participate in Venous Thrombosis (VTE)/Deep Vein Thrombosis (DVT)Prevention Measures  Outcome: PROGRESSING AS EXPECTED  Note:   Pt educated on VTE/DVT prevention and verbalized understanding. Pt has lovenox SQ and RADHA hose for VTE/DVT prevention      Problem: Knowledge Deficit  Goal: Knowledge of disease process/condition, treatment plan, diagnostic tests, and medications will improve  Outcome: PROGRESSING AS EXPECTED  Note:   Pt educated on disease process, treatment plan and prescribed medications. Pt verbalized understanding and participates in plan of care.

## 2019-10-24 NOTE — PROGRESS NOTES
Report received from night shift RN, assumed care of pt. Pt currently sleeping in bed, in no apparent distress, unlabored breathing. Tele box on, rhythm verified. Call light within reach, bed locked and in lowest position. All fall precautions and hourly rounding in place, bed alarm on. Will continue to monitor.

## 2019-10-25 LAB
ANION GAP SERPL CALC-SCNC: 10 MMOL/L (ref 0–11.9)
BUN SERPL-MCNC: 34 MG/DL (ref 8–22)
CALCIUM SERPL-MCNC: 9.6 MG/DL (ref 8.5–10.5)
CHLORIDE SERPL-SCNC: 101 MMOL/L (ref 96–112)
CO2 SERPL-SCNC: 26 MMOL/L (ref 20–33)
CREAT SERPL-MCNC: 0.97 MG/DL (ref 0.5–1.4)
GLUCOSE BLD-MCNC: 158 MG/DL (ref 65–99)
GLUCOSE BLD-MCNC: 192 MG/DL (ref 65–99)
GLUCOSE SERPL-MCNC: 154 MG/DL (ref 65–99)
MAGNESIUM SERPL-MCNC: 2 MG/DL (ref 1.5–2.5)
POTASSIUM SERPL-SCNC: 3.8 MMOL/L (ref 3.6–5.5)
SODIUM SERPL-SCNC: 137 MMOL/L (ref 135–145)

## 2019-10-25 PROCEDURE — 36415 COLL VENOUS BLD VENIPUNCTURE: CPT

## 2019-10-25 PROCEDURE — 82962 GLUCOSE BLOOD TEST: CPT

## 2019-10-25 PROCEDURE — 83735 ASSAY OF MAGNESIUM: CPT

## 2019-10-25 PROCEDURE — G0378 HOSPITAL OBSERVATION PER HR: HCPCS

## 2019-10-25 PROCEDURE — 700111 HCHG RX REV CODE 636 W/ 250 OVERRIDE (IP): Performed by: HOSPITALIST

## 2019-10-25 PROCEDURE — 96372 THER/PROPH/DIAG INJ SC/IM: CPT

## 2019-10-25 PROCEDURE — 97535 SELF CARE MNGMENT TRAINING: CPT

## 2019-10-25 PROCEDURE — A9270 NON-COVERED ITEM OR SERVICE: HCPCS | Performed by: HOSPITALIST

## 2019-10-25 PROCEDURE — 700102 HCHG RX REV CODE 250 W/ 637 OVERRIDE(OP): Performed by: STUDENT IN AN ORGANIZED HEALTH CARE EDUCATION/TRAINING PROGRAM

## 2019-10-25 PROCEDURE — 700102 HCHG RX REV CODE 250 W/ 637 OVERRIDE(OP): Performed by: HOSPITALIST

## 2019-10-25 PROCEDURE — 99225 PR SUBSEQUENT OBSERVATION CARE,LEVEL II: CPT | Performed by: INTERNAL MEDICINE

## 2019-10-25 PROCEDURE — 80048 BASIC METABOLIC PNL TOTAL CA: CPT

## 2019-10-25 PROCEDURE — 700102 HCHG RX REV CODE 250 W/ 637 OVERRIDE(OP): Performed by: INTERNAL MEDICINE

## 2019-10-25 PROCEDURE — A9270 NON-COVERED ITEM OR SERVICE: HCPCS | Performed by: INTERNAL MEDICINE

## 2019-10-25 PROCEDURE — A9270 NON-COVERED ITEM OR SERVICE: HCPCS | Performed by: STUDENT IN AN ORGANIZED HEALTH CARE EDUCATION/TRAINING PROGRAM

## 2019-10-25 RX ADMIN — CYANOCOBALAMIN TAB 500 MCG 1000 MCG: 500 TAB at 05:48

## 2019-10-25 RX ADMIN — METOPROLOL TARTRATE 12.5 MG: 25 TABLET, FILM COATED ORAL at 17:07

## 2019-10-25 RX ADMIN — INSULIN HUMAN 1 UNITS: 100 INJECTION, SOLUTION PARENTERAL at 19:44

## 2019-10-25 RX ADMIN — MIDODRINE HYDROCHLORIDE 5 MG: 5 TABLET ORAL at 17:07

## 2019-10-25 RX ADMIN — NYSTATIN: 100000 POWDER TOPICAL at 05:50

## 2019-10-25 RX ADMIN — MAGNESIUM GLUCONATE 500 MG ORAL TABLET 400 MG: 500 TABLET ORAL at 05:48

## 2019-10-25 RX ADMIN — PYRIDOSTIGMINE BROMIDE 90 MG: 60 TABLET ORAL at 17:10

## 2019-10-25 RX ADMIN — NYSTATIN: 100000 POWDER TOPICAL at 17:11

## 2019-10-25 RX ADMIN — PYRIDOSTIGMINE BROMIDE 90 MG: 60 TABLET ORAL at 05:50

## 2019-10-25 RX ADMIN — FLUOXETINE HYDROCHLORIDE 60 MG: 20 CAPSULE ORAL at 05:47

## 2019-10-25 RX ADMIN — PYRIDOSTIGMINE BROMIDE 90 MG: 60 TABLET ORAL at 11:40

## 2019-10-25 RX ADMIN — FLUDROCORTISONE ACETATE 0.2 MG: 0.1 TABLET ORAL at 05:47

## 2019-10-25 RX ADMIN — MIDODRINE HYDROCHLORIDE 5 MG: 5 TABLET ORAL at 05:47

## 2019-10-25 RX ADMIN — METOPROLOL TARTRATE 12.5 MG: 25 TABLET, FILM COATED ORAL at 05:47

## 2019-10-25 RX ADMIN — MIDODRINE HYDROCHLORIDE 5 MG: 5 TABLET ORAL at 11:40

## 2019-10-25 RX ADMIN — RISPERIDONE 3 MG: 1 TABLET, FILM COATED ORAL at 17:07

## 2019-10-25 RX ADMIN — ENOXAPARIN SODIUM 40 MG: 100 INJECTION SUBCUTANEOUS at 05:48

## 2019-10-25 RX ADMIN — ASPIRIN 81 MG 81 MG: 81 TABLET ORAL at 05:47

## 2019-10-25 RX ADMIN — PRAVASTATIN SODIUM 20 MG: 20 TABLET ORAL at 05:47

## 2019-10-25 ASSESSMENT — ENCOUNTER SYMPTOMS
ORTHOPNEA: 0
COUGH: 0
NERVOUS/ANXIOUS: 0
DEPRESSION: 0
DIZZINESS: 1
FEVER: 0
SHORTNESS OF BREATH: 0

## 2019-10-25 NOTE — THERAPY
PT tx attempted. Pt observed ambulating with CNA in hallway, reports able to complete 1/2 lap around unit. BP upon return to EOB was elevated and HR into 160-170s so deferred further mobility or stair training at this time. Pt seated in chair, asymptomatic and provided with Seated UE/LE exercise handout to work on between session as see how HR and BP respond.  Pt verbalized understanding of deferring tx and goal to practice stairs prior to DC home as pt has 5 steps to enter. Will reattempt PT tx as able.     Litzy Foy, PT, DPT Pager: [phone]

## 2019-10-25 NOTE — DISCHARGE PLANNING
No call backs from [facility]. LUNA moving forward with other transportation.   LUNA called [facility] [phone] to schedule transport. Patient does qualify for services.  Patient is set up for transportation home with [employer] at 11:00am.

## 2019-10-25 NOTE — DISCHARGE PLANNING
"Patient will be D/C tomorrow. SW contacted [facility] to see if there is any transportation.   SW called \"Frederic\", who reports he will call there community triage department who works on weekends.    will call me back.   "

## 2019-10-25 NOTE — PROGRESS NOTES
Hospital Medicine Daily Progress Note    Date of Service:  Code Status: Full Code     Chief Complaint  Chief Complaint   Patient presents with   • Syncope   • Chest Pain       Consultants/Specialty: Cardiology - signed off Disposition: Remain IP     Hospital Course     ER and Admission Day course  54 y.o. male who presented 10/19/2019 with history of orthostatic hypotension recurrent syncope and A. fib presented to the emergency room for evaluation of syncope.  He has had an extensive evaluation in the past with cardiology and neurology  he was previously on Xarelto but this was discontinued due to his recurrent syncope and increased risk of bleeding.  He has been maintained on Florinef Midodrin and pyridostigmine.  He reports that this morning while getting up to go take his medications he felt lightheaded and had a syncopal episode.  He regained consciousness spontaneously.  He denies any chest pain.  He was transferred to the emergency room where he was noted to be in A. fib with RVR.  I discussed with Dr. Sosa  and recommended giving 1 dose of IV Cardizem 25 mg with good response.  Patient was seen in the past by Dr. Black but at that time the plan was to discontinue his amiodarone and evaluate for possible ablation.  He never followed up with cardiology. He has had more syncopal spells despite the pyridostigmine, florinef, and midodrine. He came in with another syncopal spell after getting up. Found to be in AF with slight RVR here, rates better with IV dilt. Still on quarter dose amiodarone.     Orthostasis-he feels that the abdominal binder and compression stockings are helping. Got up today and did not feel quite as dizzy. No other issues.        Interval Problem Update  Patient was seen and evaluated today for syncope and chest pain      Review of Systems  Review of Systems   Constitutional: Negative for fever.   Respiratory: Negative for cough and shortness of breath.    Cardiovascular: Negative for  orthopnea and leg swelling.   Skin: Negative for rash.   Neurological: Positive for dizziness (improved today).   Psychiatric/Behavioral: Negative for depression. The patient is not nervous/anxious.     Physical Exam  Physical Exam   Constitutional: He is oriented to person, place, and time. No distress.   HENT:   Mouth/Throat: No oropharyngeal exudate.   Pulmonary/Chest: No stridor. He has no wheezes. He has no rales.   Abdominal: Soft. He exhibits no distension.   AB binder in place   Musculoskeletal: He exhibits edema (Unchanged today, scant).   Neurological: He is alert and oriented to person, place, and time. No cranial nerve deficit. Coordination normal.   Skin: Skin is warm and dry. He is not diaphoretic. No erythema.   Chronic venous stasis changes B/L LE's   Psychiatric: He has a normal mood and affect.   Nursing note and vitals reviewed.       I/Os    Intake/Output Summary (Last 24 hours) at 10/25/2019 1056  Last data filed at 10/25/2019 0838  Gross per 24 hour   Intake 600 ml   Output 1200 ml   Net -600 ml    Vital Signs  Temp:  [36.2 °C (97.2 °F)-37 °C (98.6 °F)] 36.5 °C (97.7 °F)  Pulse:  [67-97] 97  Resp:  [16-17] 16  BP: ()/() 104/72  SpO2:  [90 %-94 %] 90 %     Laboratory  Results from last 7 days   Lab Units 10/23/19  0328 10/20/19  0334   WBC 1501 K/uL 8.8 8.8   HGB 1503 g/dL 16.3 16.1   HCT 1504 % 47.9 50.4   PLATELET COUNT 1518 K/uL 158* 179    Results from last 7 days   Lab Units 10/25/19  0318 10/24/19  0322   SODIUM 101 mmol/L 137 138   POTASSIUM 102 mmol/L 3.8 3.6   CHLORIDE 103 mmol/L 101 100   CO2 104 mmol/L 26 26   ANION GAP ANION  10.0 12.0*    mg/dL 34* 37*   CREATININE 109 mg/dL 0.97 1.00   CALCIUM 105 mg/dL 9.6 9.4   GLUCOSE 112 mg/dL 154* 145*   IF PEEWEE AMER [id number] mL/min/1.73 m 2 >60 >60   IF NON AFRICAN AMER 109GFRB mL/min/1.73 m 2 >60 >60   MAGNESIUM 561 mg/dL 2.0 2.0           Medications    Scheduled medications:  insulin regular 1-6 Units Subcutaneous 4X/DAY  ACHS   midodrine 5 mg Oral TID   metoprolol 12.5 mg Oral TWICE DAILY   nystatin  Topical BID   pyridostigmine 90 mg Oral TID   aspirin 81 mg Oral DAILY   cyanocobalamin 1,000 mcg Oral DAILY   fludrocortisone 0.2 mg Oral QAM   FLUoxetine 60 mg Oral DAILY   magnesium oxide 400 mg Oral DAILY   pravastatin 20 mg Oral DAILY   risperiDONE 3 mg Oral Q EVENING   senna-docusate 2 Tab Oral BID   enoxaparin 40 mg Subcutaneous DAILY        Medications were reviewed today.      Assessment/Plan  * Chronic atrial fibrillation- (present on admission)  Assessment & Plan  -HR controlled, continue metoprolol 12.5 mg BID  10/19 - adm - Will start patient on metoprolol and titrate as tolerated to the lowest dose for rate control. We will ask for cardiology input this patient has not followed up with cardiology since his last visit. Given his recurrent syncope he is at high risk for bleeding so he has not been maintained on anticoagulation given this fact I doubt he will be a candidate for ablation or cardioversion    Orthostatic hypotension- (present on admission)  Assessment & Plan  -gentle diuresis has to be stopped as patient becomes too symptomatic  -start abdominal binder and B/L compression stockings thigh high-seeing some noticeable improvement now, evaluate for one more day in the hospital  -monitor  -continue outpatient medications  -unclear etiology  -thoroughly worked up in the past  -check BID orthostats      Venous stasis dermatitis of both lower extremities- (present on admission)  Assessment & Plan  ordered compression stockings    Polypharmacy- (present on admission)  Assessment & Plan  -polypharmacy in this particular clinical situation is necessary as patient has severe refractory orthostatic hypotension  -continue current medications and try to wean off what is possible    Pre-diabetes- (present on admission)  Assessment & Plan  -monitor sugars        Nonischemic cardiomyopathy (HCC)- (present on  admission)  Assessment & Plan  -volume status is difficult to determine       VTE prophylaxis: lovenox    Imaging  CT-CTA CHEST PULMONARY ARTERY W/ RECONS   Final Result      1.  No CT evidence for pulmonary emboli.   2.  No pneumonia or pneumothorax.   3.  Minimal bibasilar atelectasis.            DX-CHEST-PORTABLE (1 VIEW)   Final Result      1.  Mild pulmonary vascular congestion.   2.  No lobar pneumonia or overt pulmonary edema.

## 2019-10-25 NOTE — CARE PLAN
Problem: Safety  Goal: Will remain free from injury  Outcome: PROGRESSING AS EXPECTED  Intervention: Provide assistance with mobility  Note:   Patient ambulates well with minimal assistance. Fall precautions in place. Patient calls appropriately for help when getting up.      Problem: Bowel/Gastric:  Goal: Normal bowel function is maintained or improved  Outcome: PROGRESSING AS EXPECTED  Intervention: Educate patient and significant other/support system about diet, fluid intake, medications and activity to promote bowel function  Note:   Patient eating well. No complaints of abd pain or nausea/vomiting. Last BM 10/23

## 2019-10-25 NOTE — DISCHARGE PLANNING
LUNA called back [facility] [phone] #[phone], then Frederic samuels CM on update regarding transportation. Left message.

## 2019-10-25 NOTE — CARE PLAN
Problem: Respiratory:  Goal: Respiratory status will improve  Outcome: PROGRESSING AS EXPECTED  Note:   SPO2 >92% on RA during orthostatics and mobilization. Will continue to monitor during sleep. Patient exhibiting even, unlabored breathing.      Problem: Mobility  Goal: Risk for activity intolerance will decrease  Outcome: PROGRESSING AS EXPECTED  Note:   Orthostatics completed. Discussed with patient importance of sitting at edge of bed and dangling feet prior to standing and to sit if starting to feel dizzy/lightheaded. Call for assistance with mobilization. Call light within reach.

## 2019-10-25 NOTE — PROGRESS NOTES
Bedside shift report completed with night shift RN. Patient resting comfortably. No complaints of pain or signs of distress. Fall precautions in place. Chart reviewed. Will continue to monitor patient and update plan of care.

## 2019-10-26 VITALS
HEIGHT: 72 IN | DIASTOLIC BLOOD PRESSURE: 110 MMHG | BODY MASS INDEX: 41.12 KG/M2 | TEMPERATURE: 97.6 F | HEART RATE: 101 BPM | OXYGEN SATURATION: 92 % | WEIGHT: 303.57 LBS | SYSTOLIC BLOOD PRESSURE: 144 MMHG | RESPIRATION RATE: 18 BRPM

## 2019-10-26 PROBLEM — R53.81 DEBILITY: Status: ACTIVE | Noted: 2019-10-26

## 2019-10-26 LAB — GLUCOSE BLD-MCNC: 175 MG/DL (ref 65–99)

## 2019-10-26 PROCEDURE — A9270 NON-COVERED ITEM OR SERVICE: HCPCS | Performed by: INTERNAL MEDICINE

## 2019-10-26 PROCEDURE — A9270 NON-COVERED ITEM OR SERVICE: HCPCS | Performed by: STUDENT IN AN ORGANIZED HEALTH CARE EDUCATION/TRAINING PROGRAM

## 2019-10-26 PROCEDURE — 82962 GLUCOSE BLOOD TEST: CPT

## 2019-10-26 PROCEDURE — A9270 NON-COVERED ITEM OR SERVICE: HCPCS | Performed by: HOSPITALIST

## 2019-10-26 PROCEDURE — 700102 HCHG RX REV CODE 250 W/ 637 OVERRIDE(OP): Performed by: HOSPITALIST

## 2019-10-26 PROCEDURE — 700111 HCHG RX REV CODE 636 W/ 250 OVERRIDE (IP): Performed by: HOSPITALIST

## 2019-10-26 PROCEDURE — G0378 HOSPITAL OBSERVATION PER HR: HCPCS

## 2019-10-26 PROCEDURE — 99217 PR OBSERVATION CARE DISCHARGE: CPT | Performed by: HOSPITALIST

## 2019-10-26 PROCEDURE — 96372 THER/PROPH/DIAG INJ SC/IM: CPT

## 2019-10-26 PROCEDURE — 700102 HCHG RX REV CODE 250 W/ 637 OVERRIDE(OP): Performed by: STUDENT IN AN ORGANIZED HEALTH CARE EDUCATION/TRAINING PROGRAM

## 2019-10-26 PROCEDURE — 700102 HCHG RX REV CODE 250 W/ 637 OVERRIDE(OP): Performed by: INTERNAL MEDICINE

## 2019-10-26 PROCEDURE — 97116 GAIT TRAINING THERAPY: CPT

## 2019-10-26 RX ORDER — PYRIDOSTIGMINE BROMIDE 60 MG/1
90 TABLET ORAL 3 TIMES DAILY
Qty: 90 TAB | Refills: 0 | Status: SHIPPED | OUTPATIENT
Start: 2019-10-26 | End: 2020-09-06

## 2019-10-26 RX ADMIN — ENOXAPARIN SODIUM 40 MG: 100 INJECTION SUBCUTANEOUS at 05:09

## 2019-10-26 RX ADMIN — CYANOCOBALAMIN TAB 500 MCG 1000 MCG: 500 TAB at 05:10

## 2019-10-26 RX ADMIN — METOPROLOL TARTRATE 12.5 MG: 25 TABLET, FILM COATED ORAL at 05:10

## 2019-10-26 RX ADMIN — MAGNESIUM GLUCONATE 500 MG ORAL TABLET 400 MG: 500 TABLET ORAL at 05:10

## 2019-10-26 RX ADMIN — FLUOXETINE HYDROCHLORIDE 60 MG: 20 CAPSULE ORAL at 05:10

## 2019-10-26 RX ADMIN — INSULIN HUMAN 1 UNITS: 100 INJECTION, SOLUTION PARENTERAL at 08:26

## 2019-10-26 RX ADMIN — PYRIDOSTIGMINE BROMIDE 90 MG: 60 TABLET ORAL at 05:11

## 2019-10-26 RX ADMIN — MIDODRINE HYDROCHLORIDE 5 MG: 5 TABLET ORAL at 05:10

## 2019-10-26 RX ADMIN — ASPIRIN 81 MG 81 MG: 81 TABLET ORAL at 05:10

## 2019-10-26 RX ADMIN — FLUDROCORTISONE ACETATE 0.2 MG: 0.1 TABLET ORAL at 05:10

## 2019-10-26 RX ADMIN — PRAVASTATIN SODIUM 20 MG: 20 TABLET ORAL at 05:10

## 2019-10-26 RX ADMIN — NYSTATIN: 100000 POWDER TOPICAL at 05:13

## 2019-10-26 ASSESSMENT — COGNITIVE AND FUNCTIONAL STATUS - GENERAL
SUGGESTED CMS G CODE MODIFIER MOBILITY: CH
MOBILITY SCORE: 24

## 2019-10-26 ASSESSMENT — GAIT ASSESSMENTS
DEVIATION: BRADYKINETIC;ANTALGIC
GAIT LEVEL OF ASSIST: SUPERVISED
DISTANCE (FEET): 150

## 2019-10-26 NOTE — FACE TO FACE
Face to Face Note  -  Durable Medical Equipment    Catrachita Palencia M.D. - NPI: [id number]  I certify that this patient is under my care and that they had a durable medical equipment(DME)face to face encounter by myself that meets the physician DME face-to-face encounter requirements with this patient on:    Date of encounter:   Patient:                    MRN:                       YOB: 2021  Sebastián Castro  [id number]  1965     The encounter with the patient was in whole, or in part, for the following medical condition, which is the primary reason for durable medical equipment:  Other - Falls, debility and orthostatic hypotension     I certify that, based on my findings, the following durable medical equipment is medically necessary:  Walkers.    HOME O2 Saturation Measurements:(Values must be present for Home Oxygen orders)         ,     ,         My Clinical findings support the need for the above equipment due to:  Abnormal Gait, Other - falls    Supporting Symptoms: Abnormal gait and falls

## 2019-10-26 NOTE — PROGRESS NOTES
Received report from day shift RN. Patient is A&Ox4, no complaints of pain, VSS, no signs of distress. All questions and concerns answered, bed in lowest and locked position, call light in reach, will continue to monitor.

## 2019-10-26 NOTE — DISCHARGE SUMMARY
Discharge Summary    CHIEF COMPLAINT ON ADMISSION  Chief Complaint   Patient presents with   • Syncope   • Chest Pain       Reason for Admission  Syncope    Admission Date  10/19/2019    CODE STATUS  Full    HPI & HOSPITAL COURSE  53 years old male with past medical history of diabetes, hypertension, atrial fibrillation not on anticoagulation due to frequent falls dyslipidemia, recurrent hospitalization with syncope secondary to orthostatic hypotension.  Admitted October 19 with another episode of syncope.  The patient had extensive evaluation with cardiology and neurology in the past.  He is on multiple interventions for orthostatic hypotension including midodrine, pyridostigmine, compression stockings and abdominal binder.  The patient developed atrial fibrillation with rapid ventricular response during his hospitalization, evaluated by cardiology who started on metoprolol, and recommended discontinuing amiodarone.  Anticoagulation was discouraged by cardiology given high risk for falls and bleeding.  Patient will need to follow-up closely with primary care and cardiology on discharge.  Home health was recommended and offered to the patient however he refused understanding and accepting the risks of his decision including significant injury, falls and even death.  He understands he will need to keep a log of his blood pressures and heart rates and is planning to keep his appointments at this time.  Therefore, he is discharged in guarded and stable condition to home with close outpatient follow-up.  The patient met 2-midnight criteria for an inpatient stay at the time of discharge.    Discharge Date  10/26/2019     FOLLOW UP ITEMS POST DISCHARGE  Follow-up with primary care provider within 5 days.  Follow-up with cardiology as instructed.    DISCHARGE DIAGNOSES  Principal Problem:    Chronic atrial fibrillation POA: Yes  Active Problems:    Orthostatic hypotension POA: Yes    Venous stasis dermatitis of both lower  extremities POA: Yes    Nonischemic cardiomyopathy (HCC) POA: Yes    Pre-diabetes POA: Yes    Polypharmacy POA: Yes    Debility POA: Yes  Resolved Problems:    * No resolved hospital problems. *    FOLLOW UP  Future Appointments   Date Time Provider Department Center   11/4/2019 12:40 PM Carly Renteria P.A.-C. [facility] None     [address] [id number] [phone]    Please walk in on 10/28/2019 to schedule and appointment with a new primary care provider. Thank you.     [facility]  [address]  [place] [address]  [phone]    You can also call to establish a primary care provider. Thank you.     MEDICATIONS ON DISCHARGE     Medication List      START taking these medications      Instructions   metoprolol 25 MG Tabs  Commonly known as:  LOPRESSOR   Take 0.5 Tabs by mouth 2 Times a Day.  Dose:  12.5 mg        CHANGE how you take these medications      Instructions   pyridostigmine 60 MG Tabs  What changed:  how much to take  Commonly known as:  MESTINON   Take 1.5 Tabs by mouth 3 times a day.  Dose:  90 mg        CONTINUE taking these medications      Instructions   aspirin 81 MG Chew chewable tablet  Commonly known as:  ASA   Take 1 Tab by mouth every day.  Dose:  81 mg     cyanocobalamin 1000 MCG Tabs  Commonly known as:  VITAMIN B12   Take 1 Tab by mouth every day.  Dose:  1,000 mcg     fludrocortisone 0.1 MG Tabs  Commonly known as:  FLORINEF   Take 2 Tabs by mouth every morning.  Dose:  0.2 mg     FLUoxetine 20 MG Caps  Commonly known as:  PROZAC   Take 3 Caps by mouth every day.  Dose:  60 mg     furosemide 20 MG Tabs  Commonly known as:  LASIX   Take 0.5 Tabs by mouth 1 time daily as needed (Edema, Shortness of breath or weight gain.).  Dose:  10 mg     magnesium oxide 400 (241.3 Mg) MG Tabs tablet  Commonly known as:  MAG-OX   Take 1 Tab by mouth every day.  Dose:  400 mg     midodrine 10 MG tablet  Commonly known as:   PROAMATINE   Doctor's comments:  Hold if SBP > 150  Take 1 Tab by mouth 3 times a day.  Dose:  10 mg     pravastatin 20 MG Tabs  Commonly known as:  PRAVACHOL   Take 1 Tab by mouth every day.  Dose:  20 mg     risperiDONE 3 MG Tabs  Commonly known as:  RISPERDAL   Take 1 Tab by mouth every evening.  Dose:  3 mg     sodium chloride 1 GM Tabs  Commonly known as:  SALT   Take 1 Tab by mouth 3 times a day, with meals.  Dose:  1 g        STOP taking these medications    amiodarone 100 MG tablet  Commonly known as:  PACERONE          Allergies  Allergies   Allergen Reactions   • Daptomycin Rash     Body rash  RXN=1/2018     • Pcn [Penicillins] Hives and Rash      Rash & hives  RXN=since a kid   • Unasyn [Ampicillin-Sulbactam Sodium] Hives, Itching and Swelling   • Vancomycin Rash     Red man syndrome     CONSULTATIONS  Cardiology    PROCEDURES  None    LABORATORY  Lab Results   Component Value Date    SODIUM 137 10/25/2019    POTASSIUM 3.8 10/25/2019    CHLORIDE 101 10/25/2019    CO2 26 10/25/2019    GLUCOSE 154 (H) 10/25/2019    BUN 34 (H) 10/25/2019    CREATININE 0.97 10/25/2019        Lab Results   Component Value Date    WBC 8.8 10/23/2019    HEMOGLOBIN 16.3 10/23/2019    HEMATOCRIT 47.9 10/23/2019    PLATELETCT 158 (L) 10/23/2019      Total time of the discharge process exceeds 37 minutes.

## 2019-10-26 NOTE — CARE PLAN
Problem: Communication  Goal: The ability to communicate needs accurately and effectively will improve  Outcome: MET     Problem: Safety  Goal: Will remain free from injury  Outcome: MET  Goal: Will remain free from falls  Outcome: MET     Problem: Infection  Goal: Will remain free from infection  Outcome: MET     Problem: Venous Thromboembolism (VTW)/Deep Vein Thrombosis (DVT) Prevention:  Goal: Patient will participate in Venous Thrombosis (VTE)/Deep Vein Thrombosis (DVT)Prevention Measures  Outcome: MET     Problem: Bowel/Gastric:  Goal: Normal bowel function is maintained or improved  Outcome: MET  Goal: Will not experience complications related to bowel motility  Outcome: MET     Problem: Knowledge Deficit  Goal: Knowledge of disease process/condition, treatment plan, diagnostic tests, and medications will improve  Outcome: MET  Goal: Knowledge of the prescribed therapeutic regimen will improve  Outcome: MET     Problem: Discharge Barriers/Planning  Goal: Patient's continuum of care needs will be met  Outcome: MET     Problem: Respiratory:  Goal: Respiratory status will improve  Outcome: MET     Problem: Mobility  Goal: Risk for activity intolerance will decrease  Outcome: MET     Problem: Pain Management  Goal: Pain level will decrease to patient's comfort goal  Outcome: MET

## 2019-10-26 NOTE — THERAPY
"Pt seen fot PT tx session to address stair safety. Pt tolerated ambulation x150 ft without AD. Noted progressive LE weakness and intermittent knee buckling as ambulation continued; however, no overt LOB. Pt negotiated 6 steps with 1-2 railing support with step over step pattern. Educated on step to pattern on stairs for safety and energy conservation as needed, also discussed use of FWW for safety and stability during long distance ambulation. Elevated BP following ambulation/stairs, HR into 140s during ambulation- RN present and aware. Pt reports good support from roommate who will be able to assist with suleiman LEONARDO hose. Pt has met all acute PT goals, acute PT to sign off at this time. Please continue to mobilize/ambulate with nursing staff. Pt may benefit from HH therapy follow up to ensure safe transition to home environment and continued mobility without falls.     Physical Therapy Treatment completed.   Bed Mobility:  Supine to Sit: (in chair pre/post PT session)  Transfers: Sit to Stand: Supervised  Gait: Level Of Assist: Supervised with No Equipment Needed       Plan of Care: Patient with no further skilled PT needs in the acute care setting at this time  Discharge Recommendations: Equipment: Front-Wheel Walker. Post-acute therapy: Recommend home health transitional care for continued physical therapy services.        See \"Rehab Therapy-Acute\" Patient Summary Report for complete documentation.       "

## 2019-10-26 NOTE — DISCHARGE INSTRUCTIONS
HF Patient Discharge Instructions  · Monitor your weight daily, and maintain a weight chart, to track your weight changes.   · Activity as tolerated, unless your Doctor has ordered otherwise.  · Follow a low fat, low cholesterol, low salt diet unless instructed otherwise by your Doctor. Read the labels on the back of food products and track your intake of fat, cholesterol and salt.   · Fluid Restriction No. If a Fluid Restriction has been ordered by your Doctor, measure fluids with a measuring cup to ensure that you are not exceeding the restriction.   · No smoking.  · Oxygen No. If your Doctor has ordered that you wear Oxygen at home, it is important to wear it as ordered.  · Did you receive an explanation from staff on the importance of taking each of your medications and why it is necessary to keep taking them unless your doctor says to stop? Yes  · Were all of your questions answered about how to manage your heart failure and what to do if you have increased signs and symptoms after you go home? Yes  · Do you feel like your heart failure care team involved you in the care treatment plan and allowed you to make decisions regarding your care while in the hospital and addressed any discharge needs you might have? Yes    See the educational handout provided at discharge for more information on monitoring your daily weight, activity and diet. This also explains more about Heart Failure, symptoms of a flare-up and some of the tests that you have undergone.     Warning Signs of a Flare-Up include:  · Swelling in the ankles or lower legs.  · Shortness of breath, while at rest, or while doing normal activities.   · Shortness of breath at night when in bed, or coughing in bed.   · Requiring more pillows to sleep at night, or needing to sit up at night to sleep.  · Feeling weak, dizzy or fatigued.     When to call your Doctor:  · Call [facility] seven days a week from 8:00 a.m. to 8:00 p.m. for medical  questions [phone].  · Call your Primary Care Physician or Cardiologist if:   1. You experience any pain radiating to your jaw or neck.  2. You have any difficulty breathing.  3. You experience weight gain of 3 lbs in a day or 5 lbs in a week.   4. You feel any palpitations or irregular heartbeats.  5. You become dizzy or lose consciousness.   If you have had an angiogram or had a pacemaker or AICD placed, and experience:  1. Bleeding, drainage or swelling at the surgical / puncture site.  2. Fever greater than 100.0 F  3. Shock from internal defibrillator.  4. Cool and / or numb extremities.        How to walk with a walker  The best way to walk with a walker depends on whether you are using a standard walker or a front-wheeled walker. A standard walker has rubber tips on the ends of all four legs. A front-wheeled walker has wheels on the ends of the front legs and rubber tips on the ends of the back legs.  · Do not use your walker on stairs or an escalator unless you have been trained by a physical therapist or unless your health care provider approves.  To Walk With a Standard Walker:   6.  your walker. Do not slide your standard walker.  7. Set down your walker, one step-length in front of you. Make sure that all four legs of the walker touch the ground at the same time. Your toes should be farther forward than the back legs of your walker.  8. Hold on to the walker for support, and step your weaker leg into the middle of the walker.  9. Step your stronger leg forward to land next to your weaker leg.  10. Repeat this process for each step.  To Walk With a Front-Wheeled Walker:  3. Slide your front-wheeled walker one step-length in front of you. Your toes should be farther forward than the back legs of your walker.  4. Hold on to the walker for support, and step your weaker leg into the middle of the walker.  5. Step your stronger leg forward to land next to your weaker leg.  6. Repeat the process  "for each step.  Tips  · Always keep both feet within the width of the walker's legs or wheels.  · When using your walker, you should not feel like you need to lean forward or to the side to keep your hands on the handgrips.  · Make sure you are following any weight-bearing instructions that your health care provider has given you.  · If you have a standard walker:  ¨ Do not slide your walker when you are moving.  · If you have a front-wheeled walker:  ¨ Be careful not to let the walker get too far ahead of you as you walk.  ¨ If your walker does not glide well over carpet, consider cutting an \"X\" into two tennis balls and placing the balls over the back legs of your walker.  How to stand up with a walker  1. Put your walker in front of you.  2. Slide forward in your chair.  3. Position your legs so that your weaker leg is ahead of you and your stronger leg is bent and near your chair.  4. Position your hands.  ¨ If your chair has armrests, put each hand on an armrest.  ¨ If there are no armrests, put the hand opposite your weaker leg on the chair seat, and put the other hand on the center of the walker's crossbar.  5. Lean forward and push up from your chair.  6. Rise by straightening your stronger leg.  7. Steady yourself.  8. Carefully move your hands to the handgrips of the walker.  Tips  · Do not pull on the walker when you stand up. This may cause it to tip.  · Sit in a firm chair whenever you can. A low seat or an overstuffed chair or sofa is hard to get out of.  How to sit down with a walker  To Sit Down in a Seat That Has Armrests:   1. Back up toward your seat, using your walker, until you feel the back of your legs touch the chair.  2. Carefully reach your hands behind you and put each hand on an armrest.  3. Slowly lower yourself into the seat.  To Sit Down in a Seat Without Armrests:  2. Back up toward the side of the seat, using your walker, until you feel the back of your legs touch the chair.  3. Use " one hand to hold on to the back of the chair, and use the other hand to hold on to the front of the seat.  4. Slowly lower yourself into the seat.  How to use a walker on a curb or step  To Use a Walker to Step Up:  1. Put all four legs of the walker on the curb or step.  2. Get your feet as close to the curb or step as you can.  3. Test the steadiness of the walker by pressing down on the handgrips.  4. If the walker is steady, press down on it with your hands as you step up with your stronger leg.  5. Step up with your weaker leg.  To Use a Walker to Step Down   :  1. Put all four legs of the walker on the surface that is lower than the curb or step.  2. Get your feet as close to the curb or step as you can.  3. Test the steadiness of the walker by pressing down on the handgrips.  4. If the walker is steady, press down on it with your hands as you step down with your weaker leg.  5. Step down with your stronger leg.  This information is not intended to replace advice given to you by your health care provider. Make sure you discuss any questions you have with your health care provider.  Document Released: 12/18/2006 Document Revised: 05/17/2017 Document Reviewed: 07/01/2016  [employer] Patient Education © 2017 [employer]    Syncope  Introduction  Syncope is when you lose temporarily pass out (faint). Signs that you may be about to pass out include:  · Feeling dizzy or light-headed.  · Feeling sick to your stomach (nauseous).  · Seeing all white or all black.  · Having cold, clammy skin.  If you passed out, get help right away. Call your local emergency services (911 in the [place] Do not drive yourself to the hospital.  Follow these instructions at home:  Pay attention to any changes in your symptoms. Take these actions to help with your condition:  · Have someone stay with you until you feel stable.  · Do not drive, use machinery, or play sports until your doctor says it is okay.  · Keep all follow-up  visits as told by your doctor. This is important.  · If you start to feel like you might pass out, lie down right away and raise (elevate) your feet above the level of your heart. Breathe deeply and steadily. Wait until all of the symptoms are gone.  · Drink enough fluid to keep your pee (urine) clear or pale yellow.  · If you are taking blood pressure or heart medicine, get up slowly and spend many minutes getting ready to sit and then stand. This can help with dizziness.  · Take over-the-counter and prescription medicines only as told by your doctor.  Get help right away if:  · You have a very bad headache.  · You have unusual pain in your chest, tummy, or back.  · You are bleeding from your mouth or rectum.  · You have black or tarry poop (stool).  · You have a very fast or uneven heartbeat (palpitations).  · It hurts to breathe.  · You pass out once or more than once.  · You have jerky movements that you cannot control (seizure).  · You are confused.  · You have trouble walking.  · You are very weak.  · You have vision problems.  These symptoms may be an emergency. Do not wait to see if the symptoms will go away. Get medical help right away. Call your local emergency services (911 in the [place] Do not drive yourself to the hospital.   This information is not intended to replace advice given to you by your health care provider. Make sure you discuss any questions you have with your health care provider.  Document Released: 06/05/2009 Document Revised: 05/25/2017 Document Reviewed: 08/31/2016  © 2017 [employer]    Atrial Fibrillation  Introduction  Atrial fibrillation is a type of heartbeat that is irregular or fast (rapid). If you have this condition, your heart keeps quivering in a weird (chaotic) way. This condition can make it so your heart cannot pump blood normally. Having this condition gives a person more risk for stroke, heart failure, and other heart problems. There are different types of atrial  fibrillation. Talk with your doctor to learn about the type that you have.  Follow these instructions at home:  · Take over-the-counter and prescription medicines only as told by your doctor.  · If your doctor prescribed a blood-thinning medicine, take it exactly as told. Taking too much of it can cause bleeding. If you do not take enough of it, you will not have the protection that you need against stroke and other problems.  · Do not use any tobacco products. These include cigarettes, chewing tobacco, and e-cigarettes. If you need help quitting, ask your doctor.  · If you have apnea (obstructive sleep apnea), manage it as told by your doctor.  · Do not drink alcohol.  · Do not drink beverages that have caffeine. These include coffee, soda, and tea.  · Maintain a healthy weight. Do not use diet pills unless your doctor says they are safe for you. Diet pills may make heart problems worse.  · Follow diet instructions as told by your doctor.  · Exercise regularly as told by your doctor.  · Keep all follow-up visits as told by your doctor. This is important.  Contact a doctor if:  · You notice a change in the speed, rhythm, or strength of your heartbeat.  · You are taking a blood-thinning medicine and you notice more bruising.  · You get tired more easily when you move or exercise.  Get help right away if:  · You have pain in your chest or your belly (abdomen).  · You have sweating or weakness.  · You feel sick to your stomach (nauseous).  · You notice blood in your throw up (vomit), poop (stool), or pee (urine).  · You are short of breath.  · You suddenly have swollen feet and ankles.  · You feel dizzy.  · Your suddenly get weak or numb in your face, arms, or legs, especially if it happens on one side of your body.  · You have trouble talking, trouble understanding, or both.  · Your face or your eyelid droops on one side.  These symptoms may be an emergency. Do not wait to see if the symptoms will go away. Get medical  help right away. Call your local emergency services (911 in the [place] Do not drive yourself to the hospital.   This information is not intended to replace advice given to you by your health care provider. Make sure you discuss any questions you have with your health care provider.  Document Released: 09/26/2009 Document Revised: 05/25/2017 Document Reviewed: 04/13/2016  © 2017 [employer]    Discharge Instructions    Discharged to home by taxi with self. Discharged via wheelchair, hospital escort: Yes.  Special equipment needed: Not Applicable    Be sure to schedule a follow-up appointment with your primary care doctor or any specialists as instructed.     Discharge Plan:   Diet Plan: Discussed  Activity Level: Discussed  Confirmed Follow up Appointment: Appointment Scheduled  Confirmed Symptoms Management: Discussed  Medication Reconciliation Updated: Yes  Influenza Vaccine Indication: Indicated: 9 to 64 years of age  Influenza Vaccine Given - only chart on this line when given: Influenza Vaccine Given (See MAR)    I understand that a diet low in cholesterol, fat, and sodium is recommended for good health. Unless I have been given specific instructions below for another diet, I accept this instruction as my diet prescription.   Other diet: Cardiac/Heart Healthy    Special Instructions:   HF Patient Discharge Instructions  · Monitor your weight daily, and maintain a weight chart, to track your weight changes.   · Activity as tolerated, unless your Doctor has ordered otherwise. Other activity order: As tolerated.  · Follow a low fat, low cholesterol, low salt diet unless instructed otherwise by your Doctor. Read the labels on the back of food products and track your intake of fat, cholesterol and salt.   · Fluid Restriction No. If a Fluid Restriction has been ordered by your Doctor, measure fluids with a measuring cup to ensure that you are not exceeding the restriction.   · No smoking.  · Oxygen No. If your Doctor has  ordered that you wear Oxygen at home, it is important to wear it as ordered.  · Did you receive an explanation from staff on the importance of taking each of your medications and why it is necessary to keep taking them unless your doctor says to stop? Yes  · Were all of your questions answered about how to manage your heart failure and what to do if you have increased signs and symptoms after you go home? Yes  · Do you feel like your heart failure care team involved you in the care treatment plan and allowed you to make decisions regarding your care while in the hospital and addressed any discharge needs you might have? Yes    See the educational handout provided at discharge for more information on monitoring your daily weight, activity and diet. This also explains more about Heart Failure, symptoms of a flare-up and some of the tests that you have undergone.     Warning Signs of a Flare-Up include:  · Swelling in the ankles or lower legs.  · Shortness of breath, while at rest, or while doing normal activities.   · Shortness of breath at night when in bed, or coughing in bed.   · Requiring more pillows to sleep at night, or needing to sit up at night to sleep.  · Feeling weak, dizzy or fatigued.     When to call your Doctor:  · Call [facility] seven days a week from 8:00 a.m. to 8:00 p.m. for medical questions [phone].  · Call your Primary Care Physician or Cardiologist if:   6. You experience any pain radiating to your jaw or neck.  7. You have any difficulty breathing.  8. You experience weight gain of 3 lbs in a day or 5 lbs in a week.   9. You feel any palpitations or irregular heartbeats.  10. You become dizzy or lose consciousness.   If you have had an angiogram or had a pacemaker or AICD placed, and experience:  1. Bleeding, drainage or swelling at the surgical / puncture site.  2. Fever greater than 100.0 F  3. Shock from internal defibrillator.  4. Cool and / or numb  extremities.      · Is patient discharged on Warfarin / Coumadin?   No     Depression / Suicide Risk    As you are discharged from this [facility] facility, it is important to learn how to keep safe from harming yourself.    Recognize the warning signs:  · Abrupt changes in personality, positive or negative- including increase in energy   · Giving away possessions  · Change in eating patterns- significant weight changes-  positive or negative  · Change in sleeping patterns- unable to sleep or sleeping all the time   · Unwillingness or inability to communicate  · Depression  · Unusual sadness, discouragement and loneliness  · Talk of wanting to die  · Neglect of personal appearance   · Rebelliousness- reckless behavior  · Withdrawal from people/activities they love  · Confusion- inability to concentrate     If you or a loved one observes any of these behaviors or has concerns about self-harm, here's what you can do:  · Talk about it- your feelings and reasons for harming yourself  · Remove any means that you might use to hurt yourself (examples: pills, rope, extension cords, firearm)  · Get professional help from the community (Mental Health, Substance Abuse, psychological counseling)  · Do not be alone:Call your Safe Contact- someone whom you trust who will be there for you.  · Call your local CRISIS HOTLINE 386-1572 or 887-569-7882  · Call your local Children's Mobile Crisis Response Team [place] (983) 768-8898 or www.Planandoo  · Call the toll free National Suicide Prevention Hotlines   · National Suicide Prevention Lifeline 065-861-BUAQ (8638)  · National Hope Line Network 800-SUICIDE (729-1876)      Discharge Instructions per Catrachita Palencia M.D.  Follow-up with primary care provider within 5 days.  DIAGNOSIS: Orthostatic hypotension, Chronic atrial fibrillation,   Return to ER if you develop any of the following symptoms fever, chills, weakness, not feeling well, rash, bleeding, blurred vision,  palpitations, cough, pain in any part of your body, shortness of breath, nausea, vomiting, diarrhea, difficulty with urination, dizziness, headache, seizures, loss of consciousness or if you develop any new symptoms.

## 2019-10-26 NOTE — PROGRESS NOTES
Patient AOx4. All discharge instructions reviewed with patient. Patient verbalizes understanding of all follow up appointments, medications, and discharge instructions. HF booklet reviewed with patient. Patient agrees to keep track of daily Bps, and weight according to booklet. IV removed. No complaints of pain or signs of distress upon discharge. Patient fitted for walker. All belongings returned. Patient taken down stairs to meet taxi.

## 2019-10-30 NOTE — PROGRESS NOTES
CHW Martha has attempted to reach pt 3 times and has been unable to leave messages because patient's voicemail is not set up. CHW will d/c pt from [facility] services 10/30 due to lack of contact.

## 2019-11-21 NOTE — DISCHARGE PLANNING
Care Transition Team Assessment    Information Source  Orientation : Oriented x 4  Information Given By: Patient  Who is responsible for making decisions for patient? : Patient    Readmission Evaluation  Is this a readmission?: Yes - unplanned readmission  Was an appointment arranged for you prior to discharge?: Yes, attended appointment  Did you understand your discharge instructions?: Yes  Did you have enough support after your last discharge?: Yes    Elopement Risk  Legal Hold: No  Ambulatory or Self Mobile in Wheelchair: No-Not an Elopement Risk    Interdisciplinary Discharge Planning  Primary Care Physician: Jerad Lomax  Lives with - Patient's Self Care Capacity: Alone and Able to Care For Self  Able to Return to Previous ADL's: Yes  Mobility Issues: Yes  Prior Services: Skilled Home Health Services  Patient Expects to be Discharged to:: home  Assistance Needed: No  Durable Medical Equipment: Walker              Finances  Financial Barriers to Discharge: No                                  Anticipated Discharge Information  Anticipated discharge disposition: Home         Appendicitis

## 2020-02-08 NOTE — PROGRESS NOTES
[facility] Hospitalist Progress Note    Date of Service: 2018    Chief Complaint  52 y.o. male admitted 1/15/2018 with toe pain and drainage from recent amputation.    Interval Problem Update  Doing well this AM, pain is much improved with foot elevated. Overnight, had issues with his PICC line but was able to get blood return after repositioning and alteplase x2.    Consultants/Specialty  ID  Surgery  DM educators    Disposition  Pending medical and surgical clearance.  SNF vs [facility] (transport needed)        Review of Systems   Constitutional: Negative for chills, fever and malaise/fatigue.   Respiratory: Negative for shortness of breath.    Cardiovascular: Negative for chest pain, palpitations and leg swelling.   Gastrointestinal: Negative for abdominal pain, nausea and vomiting.   Genitourinary: Negative for dysuria.   Musculoskeletal: Positive for joint pain and myalgias. Negative for falls.   Skin: Negative for rash.        Foot bandaged and wrapped with and ACE. C/D/I   Neurological: Positive for weakness. Negative for headaches.   Psychiatric/Behavioral: Negative.    All other systems reviewed and are negative.     Physical Exam  Laboratory/Imaging   Hemodynamics  Temp (24hrs), Av.6 °C (97.9 °F), Min:36.4 °C (97.6 °F), Max:36.7 °C (98.1 °F)   Temperature: 36.4 °C (97.6 °F)  Pulse  Av.9  Min: 61  Max: 104    Blood Pressure: 132/83      Respiratory      Respiration: 19, Pulse Oximetry: 98 %     Work Of Breathing / Effort: Mild  RUL Breath Sounds: Clear, RML Breath Sounds: Clear, RLL Breath Sounds: Clear, ELIAS Breath Sounds: Clear, LLL Breath Sounds: Clear    Fluids    Intake/Output Summary (Last 24 hours) at 18 1735  Last data filed at 18 1700   Gross per 24 hour   Intake             1108 ml   Output              360 ml   Net              748 ml       Nutrition  Orders Placed This Encounter   Procedures   • DIET ORDER     Standing Status:   Standing     Number of Occurrences:   1     Order  "Specific Question:   Diet:     Answer:   Diabetic [3]     Physical Exam   Constitutional: He is oriented to person, place, and time. He appears well-developed. No distress.   HENT:   Mouth/Throat: Oropharynx is clear and moist.   Eyes: EOM are normal. Pupils are equal, round, and reactive to light. No scleral icterus.   Neck: Normal range of motion. Neck supple.   Cardiovascular: Normal rate and regular rhythm.    Pulmonary/Chest: Effort normal. No respiratory distress.   Abdominal: Soft. He exhibits no distension. There is no tenderness.   Musculoskeletal: He exhibits edema.   Lymphadenopathy:     He has no cervical adenopathy.   Neurological: He is alert and oriented to person, place, and time.   Skin: Skin is warm. There is erythema.   Examined with LPS 1/20; stitches in place with significant swelling and erythema. Serous drainage. Boot currently on with compression   Psychiatric: He has a normal mood and affect. His speech is normal.   Vitals reviewed.      Recent Labs      01/20/18   0359   WBC  6.6   RBC  4.30*   HEMOGLOBIN  12.0*   HEMATOCRIT  35.6*   MCV  82.8   MCH  27.9   MCHC  33.7   RDW  44.2   PLATELETCT  201   MPV  10.1     Recent Labs      01/20/18   0359   SODIUM  137   POTASSIUM  3.7   CHLORIDE  103   CO2  26   GLUCOSE  136*   BUN  19   CREATININE  0.79   CALCIUM  8.5                      Assessment/Plan     Lower limb amputation, great toe (CMS-HCC)- (present on admission)   Assessment & Plan    R great toe amputation with Dr Leo on 11/10/2017 s/p 6 weeks abx. Subsequent infection /OM. Now s/p \"right failed great toe amputation with metatarsal head osteomyelitis\" on 1/17. S/p PICC line on 1/19.  - ID and surgery following, greatly appreciate  - continue zyvox, unasyn  - LPS following, greatly appreciate  - pain control    MRI foot:  First metatarsal head edema and enhancement is compatible with osteomyelitis, seen at the deep margin of deep skin ulceration at the amputation stump  Great toe " amputation  Cellulitis, granulation tissue, phlegmonous change. No abscess  Medial first metatarsal head sesamoid edema and enhancement could be reactive or from osteomyelitis        Type 2 diabetes mellitus (CMS-HCC)- (present on admission)   Assessment & Plan    Uncontrolled with hyperglycemia. A1C 9.2%. FS have remained 180 or less over the last 24 hours.  - Holding oral hypoglycemic agents  - SSI and hypoglycemia protocol  - goal FS< 180 for better wound healing        HLD (hyperlipidemia)- (present on admission)   Assessment & Plan    Pravastatin        GERD (gastroesophageal reflux disease)- (present on admission)   Assessment & Plan    Stable.  - Pepcid        Hypertension- (present on admission)   Assessment & Plan    Normotensive.  - Continue carvedilol, lisinopril and amlodipine        Obesity- (present on admission)   Assessment & Plan    Body mass index is 39.6 kg/m².            Reviewed items::  Medications reviewed and Labs reviewed  Zabala catheter::  No Zabala  DVT: holding heparin for picc line.  DVT prophylaxis - mechanical:  SCDs  Ulcer Prophylaxis::  Yes         - - -

## 2020-06-07 NOTE — ED NOTES
Pt comes in complaining of nausea since around 1600 today. Pt also reporting increase in weakness. Pt stating just d/c'd from here this morning and was feeling good. Pt stating recent toe amputation and infection to foot    7 steps to each direction with verbal cues.

## 2020-09-06 ENCOUNTER — HOSPITAL ENCOUNTER (INPATIENT)
Facility: MEDICAL CENTER | Age: 55
LOS: 4 days | DRG: 292 | End: 2020-09-10
Attending: EMERGENCY MEDICINE | Admitting: HOSPITALIST
Payer: MEDICAID

## 2020-09-06 ENCOUNTER — APPOINTMENT (OUTPATIENT)
Dept: RADIOLOGY | Facility: MEDICAL CENTER | Age: 55
DRG: 292 | End: 2020-09-06
Attending: EMERGENCY MEDICINE
Payer: MEDICAID

## 2020-09-06 DIAGNOSIS — I50.33 ACUTE ON CHRONIC DIASTOLIC HEART FAILURE (HCC): ICD-10-CM

## 2020-09-06 DIAGNOSIS — I50.9 ACUTE CONGESTIVE HEART FAILURE, UNSPECIFIED HEART FAILURE TYPE (HCC): ICD-10-CM

## 2020-09-06 LAB
ALBUMIN SERPL BCP-MCNC: 3.9 G/DL (ref 3.2–4.9)
ALBUMIN SERPL BCP-MCNC: 4 G/DL (ref 3.2–4.9)
ALBUMIN/GLOB SERPL: 1.1 G/DL
ALBUMIN/GLOB SERPL: 1.1 G/DL
ALP SERPL-CCNC: 103 U/L (ref 30–99)
ALP SERPL-CCNC: 93 U/L (ref 30–99)
ALT SERPL-CCNC: 17 U/L (ref 2–50)
ALT SERPL-CCNC: 18 U/L (ref 2–50)
ANION GAP SERPL CALC-SCNC: 12 MMOL/L (ref 7–16)
ANION GAP SERPL CALC-SCNC: 17 MMOL/L (ref 7–16)
AST SERPL-CCNC: 18 U/L (ref 12–45)
AST SERPL-CCNC: 24 U/L (ref 12–45)
BASOPHILS # BLD AUTO: 0.5 % (ref 0–1.8)
BASOPHILS # BLD: 0.04 K/UL (ref 0–0.12)
BILIRUB SERPL-MCNC: 0.9 MG/DL (ref 0.1–1.5)
BILIRUB SERPL-MCNC: 1.2 MG/DL (ref 0.1–1.5)
BUN SERPL-MCNC: 28 MG/DL (ref 8–22)
BUN SERPL-MCNC: 30 MG/DL (ref 8–22)
CALCIUM SERPL-MCNC: 9.4 MG/DL (ref 8.5–10.5)
CALCIUM SERPL-MCNC: 9.5 MG/DL (ref 8.5–10.5)
CHLORIDE SERPL-SCNC: 100 MMOL/L (ref 96–112)
CHLORIDE SERPL-SCNC: 99 MMOL/L (ref 96–112)
CO2 SERPL-SCNC: 19 MMOL/L (ref 20–33)
CO2 SERPL-SCNC: 22 MMOL/L (ref 20–33)
COVID ORDER STATUS COVID19: NORMAL
CREAT SERPL-MCNC: 1.14 MG/DL (ref 0.5–1.4)
CREAT SERPL-MCNC: 1.17 MG/DL (ref 0.5–1.4)
EKG IMPRESSION: NORMAL
EKG IMPRESSION: NORMAL
EOSINOPHIL # BLD AUTO: 0.23 K/UL (ref 0–0.51)
EOSINOPHIL NFR BLD: 3 % (ref 0–6.9)
ERYTHROCYTE [DISTWIDTH] IN BLOOD BY AUTOMATED COUNT: 50.4 FL (ref 35.9–50)
GLOBULIN SER CALC-MCNC: 3.4 G/DL (ref 1.9–3.5)
GLOBULIN SER CALC-MCNC: 3.6 G/DL (ref 1.9–3.5)
GLUCOSE BLD-MCNC: 136 MG/DL (ref 65–99)
GLUCOSE BLD-MCNC: 164 MG/DL (ref 65–99)
GLUCOSE SERPL-MCNC: 168 MG/DL (ref 65–99)
GLUCOSE SERPL-MCNC: 175 MG/DL (ref 65–99)
HCT VFR BLD AUTO: 39.9 % (ref 42–52)
HGB BLD-MCNC: 12.2 G/DL (ref 14–18)
IMM GRANULOCYTES # BLD AUTO: 0.02 K/UL (ref 0–0.11)
IMM GRANULOCYTES NFR BLD AUTO: 0.3 % (ref 0–0.9)
LIPASE SERPL-CCNC: 19 U/L (ref 11–82)
LYMPHOCYTES # BLD AUTO: 1.52 K/UL (ref 1–4.8)
LYMPHOCYTES NFR BLD: 20 % (ref 22–41)
MAGNESIUM SERPL-MCNC: 2 MG/DL (ref 1.5–2.5)
MCH RBC QN AUTO: 26.7 PG (ref 27–33)
MCHC RBC AUTO-ENTMCNC: 30.6 G/DL (ref 33.7–35.3)
MCV RBC AUTO: 87.3 FL (ref 81.4–97.8)
MONOCYTES # BLD AUTO: 0.49 K/UL (ref 0–0.85)
MONOCYTES NFR BLD AUTO: 6.4 % (ref 0–13.4)
NEUTROPHILS # BLD AUTO: 5.31 K/UL (ref 1.82–7.42)
NEUTROPHILS NFR BLD: 69.8 % (ref 44–72)
NRBC # BLD AUTO: 0 K/UL
NRBC BLD-RTO: 0 /100 WBC
NT-PROBNP SERPL IA-MCNC: 3221 PG/ML (ref 0–125)
PLATELET # BLD AUTO: 175 K/UL (ref 164–446)
PMV BLD AUTO: 11.1 FL (ref 9–12.9)
POTASSIUM SERPL-SCNC: 4.5 MMOL/L (ref 3.6–5.5)
POTASSIUM SERPL-SCNC: 4.6 MMOL/L (ref 3.6–5.5)
PROT SERPL-MCNC: 7.3 G/DL (ref 6–8.2)
PROT SERPL-MCNC: 7.6 G/DL (ref 6–8.2)
RBC # BLD AUTO: 4.57 M/UL (ref 4.7–6.1)
SARS-COV-2 RNA RESP QL NAA+PROBE: NOTDETECTED
SODIUM SERPL-SCNC: 133 MMOL/L (ref 135–145)
SODIUM SERPL-SCNC: 136 MMOL/L (ref 135–145)
SPECIMEN SOURCE: NORMAL
TROPONIN T SERPL-MCNC: 42 NG/L (ref 6–19)
TROPONIN T SERPL-MCNC: 43 NG/L (ref 6–19)
TROPONIN T SERPL-MCNC: 44 NG/L (ref 6–19)
TROPONIN T SERPL-MCNC: 44 NG/L (ref 6–19)
WBC # BLD AUTO: 7.6 K/UL (ref 4.8–10.8)

## 2020-09-06 PROCEDURE — 93005 ELECTROCARDIOGRAM TRACING: CPT | Performed by: EMERGENCY MEDICINE

## 2020-09-06 PROCEDURE — 94760 N-INVAS EAR/PLS OXIMETRY 1: CPT

## 2020-09-06 PROCEDURE — 83690 ASSAY OF LIPASE: CPT

## 2020-09-06 PROCEDURE — 96375 TX/PRO/DX INJ NEW DRUG ADDON: CPT

## 2020-09-06 PROCEDURE — C9803 HOPD COVID-19 SPEC COLLECT: HCPCS | Performed by: EMERGENCY MEDICINE

## 2020-09-06 PROCEDURE — 700111 HCHG RX REV CODE 636 W/ 250 OVERRIDE (IP): Performed by: HOSPITALIST

## 2020-09-06 PROCEDURE — 71045 X-RAY EXAM CHEST 1 VIEW: CPT

## 2020-09-06 PROCEDURE — 700111 HCHG RX REV CODE 636 W/ 250 OVERRIDE (IP)

## 2020-09-06 PROCEDURE — 82962 GLUCOSE BLOOD TEST: CPT

## 2020-09-06 PROCEDURE — 83880 ASSAY OF NATRIURETIC PEPTIDE: CPT

## 2020-09-06 PROCEDURE — 84484 ASSAY OF TROPONIN QUANT: CPT | Mod: 91

## 2020-09-06 PROCEDURE — 36415 COLL VENOUS BLD VENIPUNCTURE: CPT

## 2020-09-06 PROCEDURE — A9270 NON-COVERED ITEM OR SERVICE: HCPCS | Performed by: HOSPITALIST

## 2020-09-06 PROCEDURE — 99223 1ST HOSP IP/OBS HIGH 75: CPT | Performed by: HOSPITALIST

## 2020-09-06 PROCEDURE — 80053 COMPREHEN METABOLIC PANEL: CPT

## 2020-09-06 PROCEDURE — 83735 ASSAY OF MAGNESIUM: CPT

## 2020-09-06 PROCEDURE — 96374 THER/PROPH/DIAG INJ IV PUSH: CPT

## 2020-09-06 PROCEDURE — 85025 COMPLETE CBC W/AUTO DIFF WBC: CPT

## 2020-09-06 PROCEDURE — 700111 HCHG RX REV CODE 636 W/ 250 OVERRIDE (IP): Performed by: EMERGENCY MEDICINE

## 2020-09-06 PROCEDURE — 770020 HCHG ROOM/CARE - TELE (206)

## 2020-09-06 PROCEDURE — 700101 HCHG RX REV CODE 250: Performed by: HOSPITALIST

## 2020-09-06 PROCEDURE — 700101 HCHG RX REV CODE 250: Performed by: EMERGENCY MEDICINE

## 2020-09-06 PROCEDURE — 700102 HCHG RX REV CODE 250 W/ 637 OVERRIDE(OP): Performed by: HOSPITALIST

## 2020-09-06 PROCEDURE — 93005 ELECTROCARDIOGRAM TRACING: CPT

## 2020-09-06 PROCEDURE — 99285 EMERGENCY DEPT VISIT HI MDM: CPT

## 2020-09-06 PROCEDURE — U0003 INFECTIOUS AGENT DETECTION BY NUCLEIC ACID (DNA OR RNA); SEVERE ACUTE RESPIRATORY SYNDROME CORONAVIRUS 2 (SARS-COV-2) (CORONAVIRUS DISEASE [COVID-19]), AMPLIFIED PROBE TECHNIQUE, MAKING USE OF HIGH THROUGHPUT TECHNOLOGIES AS DESCRIBED BY CMS-2020-01-R: HCPCS

## 2020-09-06 RX ORDER — POTASSIUM CHLORIDE 20 MEQ/1
20 TABLET, EXTENDED RELEASE ORAL DAILY
Status: DISCONTINUED | OUTPATIENT
Start: 2020-09-06 | End: 2020-09-08

## 2020-09-06 RX ORDER — ASPIRIN 81 MG/1
324 TABLET, CHEWABLE ORAL ONCE
Status: DISPENSED | OUTPATIENT
Start: 2020-09-06 | End: 2020-09-07

## 2020-09-06 RX ORDER — FUROSEMIDE 10 MG/ML
20 INJECTION INTRAMUSCULAR; INTRAVENOUS
Status: DISCONTINUED | OUTPATIENT
Start: 2020-09-06 | End: 2020-09-10 | Stop reason: HOSPADM

## 2020-09-06 RX ORDER — METOPROLOL TARTRATE 1 MG/ML
5 INJECTION, SOLUTION INTRAVENOUS ONCE
Status: COMPLETED | OUTPATIENT
Start: 2020-09-06 | End: 2020-09-06

## 2020-09-06 RX ORDER — LISINOPRIL 5 MG/1
10 TABLET ORAL
Status: DISCONTINUED | OUTPATIENT
Start: 2020-09-06 | End: 2020-09-10 | Stop reason: HOSPADM

## 2020-09-06 RX ORDER — ONDANSETRON 4 MG/1
4 TABLET, ORALLY DISINTEGRATING ORAL EVERY 6 HOURS PRN
Status: DISCONTINUED | OUTPATIENT
Start: 2020-09-06 | End: 2020-09-10 | Stop reason: HOSPADM

## 2020-09-06 RX ORDER — DEXTROSE MONOHYDRATE 25 G/50ML
50 INJECTION, SOLUTION INTRAVENOUS
Status: DISCONTINUED | OUTPATIENT
Start: 2020-09-06 | End: 2020-09-10 | Stop reason: HOSPADM

## 2020-09-06 RX ORDER — METOPROLOL TARTRATE 1 MG/ML
5 INJECTION, SOLUTION INTRAVENOUS EVERY 6 HOURS PRN
Status: DISCONTINUED | OUTPATIENT
Start: 2020-09-06 | End: 2020-09-10 | Stop reason: HOSPADM

## 2020-09-06 RX ORDER — POLYETHYLENE GLYCOL 3350 17 G/17G
1 POWDER, FOR SOLUTION ORAL
Status: DISCONTINUED | OUTPATIENT
Start: 2020-09-06 | End: 2020-09-10 | Stop reason: HOSPADM

## 2020-09-06 RX ORDER — LISINOPRIL 10 MG/1
10 TABLET ORAL DAILY
Status: ON HOLD | COMMUNITY
End: 2020-09-08

## 2020-09-06 RX ORDER — GABAPENTIN 100 MG/1
100 CAPSULE ORAL 3 TIMES DAILY
Status: ON HOLD | COMMUNITY
End: 2020-09-08

## 2020-09-06 RX ORDER — AMOXICILLIN 250 MG
2 CAPSULE ORAL 2 TIMES DAILY
Status: DISCONTINUED | OUTPATIENT
Start: 2020-09-06 | End: 2020-09-10 | Stop reason: HOSPADM

## 2020-09-06 RX ORDER — LABETALOL HYDROCHLORIDE 5 MG/ML
10 INJECTION, SOLUTION INTRAVENOUS ONCE
Status: COMPLETED | OUTPATIENT
Start: 2020-09-06 | End: 2020-09-06

## 2020-09-06 RX ORDER — BISACODYL 10 MG
10 SUPPOSITORY, RECTAL RECTAL
Status: DISCONTINUED | OUTPATIENT
Start: 2020-09-06 | End: 2020-09-10 | Stop reason: HOSPADM

## 2020-09-06 RX ORDER — FUROSEMIDE 10 MG/ML
20 INJECTION INTRAMUSCULAR; INTRAVENOUS ONCE
Status: COMPLETED | OUTPATIENT
Start: 2020-09-06 | End: 2020-09-06

## 2020-09-06 RX ORDER — PROCHLORPERAZINE EDISYLATE 5 MG/ML
10 INJECTION INTRAMUSCULAR; INTRAVENOUS EVERY 6 HOURS PRN
Status: DISCONTINUED | OUTPATIENT
Start: 2020-09-06 | End: 2020-09-10 | Stop reason: HOSPADM

## 2020-09-06 RX ORDER — ACETAMINOPHEN 325 MG/1
650 TABLET ORAL EVERY 6 HOURS PRN
Status: DISCONTINUED | OUTPATIENT
Start: 2020-09-06 | End: 2020-09-10 | Stop reason: HOSPADM

## 2020-09-06 RX ORDER — ONDANSETRON 4 MG/1
TABLET, ORALLY DISINTEGRATING ORAL
Status: COMPLETED
Start: 2020-09-06 | End: 2020-09-06

## 2020-09-06 RX ADMIN — INSULIN HUMAN 1 UNITS: 100 INJECTION, SOLUTION PARENTERAL at 20:33

## 2020-09-06 RX ADMIN — LISINOPRIL 10 MG: 5 TABLET ORAL at 11:46

## 2020-09-06 RX ADMIN — ENOXAPARIN SODIUM 60 MG: 60 INJECTION SUBCUTANEOUS at 12:00

## 2020-09-06 RX ADMIN — METOPROLOL TARTRATE 5 MG: 5 INJECTION, SOLUTION INTRAVENOUS at 07:46

## 2020-09-06 RX ADMIN — FUROSEMIDE 20 MG: 10 INJECTION, SOLUTION INTRAMUSCULAR; INTRAVENOUS at 05:51

## 2020-09-06 RX ADMIN — FUROSEMIDE 20 MG: 10 INJECTION, SOLUTION INTRAMUSCULAR; INTRAVENOUS at 17:16

## 2020-09-06 RX ADMIN — METOPROLOL TARTRATE 25 MG: 25 TABLET, FILM COATED ORAL at 11:47

## 2020-09-06 RX ADMIN — POLYETHYLENE GLYCOL 3350 1 PACKET: 17 POWDER, FOR SOLUTION ORAL at 17:16

## 2020-09-06 RX ADMIN — POTASSIUM CHLORIDE 20 MEQ: 20 TABLET, EXTENDED RELEASE ORAL at 09:00

## 2020-09-06 RX ADMIN — DOCUSATE SODIUM 50 MG AND SENNOSIDES 8.6 MG 2 TABLET: 8.6; 5 TABLET, FILM COATED ORAL at 17:16

## 2020-09-06 RX ADMIN — FUROSEMIDE 20 MG: 10 INJECTION, SOLUTION INTRAMUSCULAR; INTRAVENOUS at 11:46

## 2020-09-06 RX ADMIN — PROCHLORPERAZINE EDISYLATE 10 MG: 5 INJECTION INTRAMUSCULAR; INTRAVENOUS at 21:03

## 2020-09-06 RX ADMIN — ONDANSETRON 4 MG: 4 TABLET, ORALLY DISINTEGRATING ORAL at 18:53

## 2020-09-06 RX ADMIN — LABETALOL HYDROCHLORIDE 10 MG: 5 INJECTION, SOLUTION INTRAVENOUS at 05:52

## 2020-09-06 RX ADMIN — METOPROLOL TARTRATE 25 MG: 25 TABLET, FILM COATED ORAL at 17:16

## 2020-09-06 ASSESSMENT — ENCOUNTER SYMPTOMS
HEMOPTYSIS: 0
DOUBLE VISION: 0
CHILLS: 0
VOMITING: 0
HEADACHES: 0
BLURRED VISION: 0
BRUISES/BLEEDS EASILY: 0
PND: 0
WHEEZING: 0
PALPITATIONS: 1
HEARTBURN: 0
CLAUDICATION: 0
ABDOMINAL PAIN: 0
MYALGIAS: 0
DIZZINESS: 0
DEPRESSION: 0
BACK PAIN: 0
NAUSEA: 0
COUGH: 0
FEVER: 0
SHORTNESS OF BREATH: 1

## 2020-09-06 ASSESSMENT — PATIENT HEALTH QUESTIONNAIRE - PHQ9
2. FEELING DOWN, DEPRESSED, IRRITABLE, OR HOPELESS: NOT AT ALL
SUM OF ALL RESPONSES TO PHQ9 QUESTIONS 1 AND 2: 0
1. LITTLE INTEREST OR PLEASURE IN DOING THINGS: NOT AT ALL
1. LITTLE INTEREST OR PLEASURE IN DOING THINGS: NOT AT ALL
2. FEELING DOWN, DEPRESSED, IRRITABLE, OR HOPELESS: NOT AT ALL
SUM OF ALL RESPONSES TO PHQ9 QUESTIONS 1 AND 2: 0

## 2020-09-06 ASSESSMENT — LIFESTYLE VARIABLES
HAVE YOU EVER FELT YOU SHOULD CUT DOWN ON YOUR DRINKING: NO
HAVE PEOPLE ANNOYED YOU BY CRITICIZING YOUR DRINKING: NO
TOTAL SCORE: 0
TOTAL SCORE: 0
HOW MANY TIMES IN THE PAST YEAR HAVE YOU HAD 5 OR MORE DRINKS IN A DAY: 0
TOTAL SCORE: 0
AVERAGE NUMBER OF DAYS PER WEEK YOU HAVE A DRINK CONTAINING ALCOHOL: 0
ALCOHOL_USE: NO
EVER FELT BAD OR GUILTY ABOUT YOUR DRINKING: NO
ON A TYPICAL DAY WHEN YOU DRINK ALCOHOL HOW MANY DRINKS DO YOU HAVE: 0
CONSUMPTION TOTAL: NEGATIVE
EVER HAD A DRINK FIRST THING IN THE MORNING TO STEADY YOUR NERVES TO GET RID OF A HANGOVER: NO

## 2020-09-06 ASSESSMENT — COGNITIVE AND FUNCTIONAL STATUS - GENERAL
DRESSING REGULAR UPPER BODY CLOTHING: A LITTLE
HELP NEEDED FOR BATHING: A LITTLE
MOVING FROM LYING ON BACK TO SITTING ON SIDE OF FLAT BED: A LITTLE
PERSONAL GROOMING: A LITTLE
DRESSING REGULAR LOWER BODY CLOTHING: A LITTLE
STANDING UP FROM CHAIR USING ARMS: A LITTLE
EATING MEALS: A LITTLE
TOILETING: A LITTLE
CLIMB 3 TO 5 STEPS WITH RAILING: A LITTLE
SUGGESTED CMS G CODE MODIFIER MOBILITY: CK
DAILY ACTIVITIY SCORE: 18
SUGGESTED CMS G CODE MODIFIER DAILY ACTIVITY: CK
WALKING IN HOSPITAL ROOM: A LITTLE
MOBILITY SCORE: 18
TURNING FROM BACK TO SIDE WHILE IN FLAT BAD: A LITTLE
MOVING TO AND FROM BED TO CHAIR: A LITTLE

## 2020-09-06 ASSESSMENT — FIBROSIS 4 INDEX
FIB4 SCORE: 2.08
FIB4 SCORE: 1.38

## 2020-09-06 NOTE — ASSESSMENT & PLAN NOTE
- A. fib with RVR due to noncompliant with medications.  Maintaining good control.  Continue metoprolol.  -As per most recent discharge summary, patient is not on any anticoagulation due to risk of falls and bleeding.  -Continue to monitor on telemetry.

## 2020-09-06 NOTE — ASSESSMENT & PLAN NOTE
-Volume status continues to improve, may be near euvolemia.  Echocardiogram showed EF of 30% (down from 40% in July 2018).  -Continue IV Lasix 20 mg twice daily.  Increase Aldactone to 25 mg daily, trend renal function and potassium.  Close intake and output monitoring, and daily weights.  Continue Lopressor, and lisinopril.  -Continue to monitor on telemetry.  -Serial BNP. Repeat chest x-ray in the morning.

## 2020-09-06 NOTE — H&P
Hospital Medicine History & Physical Note    Date of Service  9/6/2020    Primary Care Physician  Pcp Pt States None    Consultants  None    Code Status  Full Code    Chief Complaint  Chief Complaint   Patient presents with   • Chest Pain     pt off meds for around 3 days, pt has hx of a-fib, felt palpitations as well as chest pain and SOB. pt states he also noticed increased swelling in legs   • Shortness of Breath       History of Presenting Illness  55 y.o. male who presented 9/6/2020 with past medical history of obesity, atrial fibrillation, heart failure, diabetes is coming today complaining of 3 days of increasing shortness of breath, lower extremity edema, patient is stated that he ran out of his medication 3 days ago and that is when he is started developing shortness of breath and fluid retention, patient complaining of palpitation denies dizziness lightheadedness no chest pain no abdominal pain denies any fever chills no ill contacts no recent travel, emergency room patient was found to be on A. fib with RVR, chest x-ray showed pulmonary edema, patient had elevated BNP and troponin, patient received metoprolol IV that improved his heart rate, patient is feeling better he is going to be started on IV Lasix, patient is going to be admitted CHF protocol with repeat echocardiogram started on metoprolol lisinopril, patient expressed understanding of his plan of care and agree with it all questions answered.    Review of Systems  Review of Systems   Constitutional: Negative for chills and fever.   HENT: Negative for congestion, ear discharge and nosebleeds.    Eyes: Negative for blurred vision and double vision.   Respiratory: Positive for shortness of breath. Negative for cough, hemoptysis and wheezing.    Cardiovascular: Positive for palpitations and leg swelling. Negative for chest pain, claudication and PND.   Gastrointestinal: Negative for abdominal pain, heartburn, nausea and vomiting.   Genitourinary:  Negative for hematuria and urgency.   Musculoskeletal: Negative for back pain and myalgias.   Skin: Negative for rash.   Neurological: Negative for dizziness and headaches.   Endo/Heme/Allergies: Does not bruise/bleed easily.   Psychiatric/Behavioral: Negative for depression and suicidal ideas.       Past Medical History   has a past medical history of A-fib (HCC), Chest pain, Congestive heart failure (HCC), Diabetes (HCC), Diabetic neuropathy (HCC), Hypercholesterolemia, Hypertension, Morbid obesity ([facility]), Noncompliance, Normal cardiac stress test, and Orthostatic hypotension.    Surgical History   has a past surgical history that includes toe amputation (Right, 11/10/2017); toe amputation (Right, 1/17/2018); irrigation & debridement ortho (Right, 2/19/2018); and zzz cardiac cath (07/21/2018).     Family History  family history includes No Known Problems in his father and mother.     Social History   reports that he has never smoked. He has never used smokeless tobacco. He reports that he does not drink alcohol or use drugs.    Allergies  Allergies   Allergen Reactions   • Daptomycin Rash     Body rash  RXN=1/2018     • Pcn [Penicillins] Hives and Rash      Rash & hives  RXN=since a kid   • Unasyn [Ampicillin-Sulbactam Sodium] Hives, Itching and Swelling   • Vancomycin Rash     Red man syndrome       Medications  None       Physical Exam  Temp:  [36.7 °C (98.1 °F)] 36.7 °C (98.1 °F)  Pulse:  [] 111  Resp:  [15-20] 18  BP: (125-180)/() 156/92  SpO2:  [92 %-97 %] 92 %    Physical Exam  Vitals signs and nursing note reviewed.   Constitutional:       Appearance: Normal appearance.   HENT:      Head: Normocephalic and atraumatic.      Nose: Nose normal.      Mouth/Throat:      Mouth: Mucous membranes are moist.      Pharynx: Oropharynx is clear.   Eyes:      General:         Right eye: No discharge.         Left eye: No discharge.      Conjunctiva/sclera: Conjunctivae normal.      Pupils: Pupils are equal,  round, and reactive to light.   Neck:      Musculoskeletal: Normal range of motion and neck supple.   Cardiovascular:      Rate and Rhythm: Normal rate and regular rhythm.      Pulses: Normal pulses.      Heart sounds: Normal heart sounds.   Pulmonary:      Effort: Pulmonary effort is normal. No respiratory distress.      Breath sounds: Rales present.   Abdominal:      General: Bowel sounds are normal. There is no distension.      Palpations: Abdomen is soft.      Tenderness: There is no abdominal tenderness. There is no guarding.   Musculoskeletal: Normal range of motion.      Right lower leg: Edema present.      Left lower leg: Edema present.   Skin:     General: Skin is warm.      Capillary Refill: Capillary refill takes less than 2 seconds.   Neurological:      General: No focal deficit present.      Mental Status: He is alert and oriented to person, place, and time.      Cranial Nerves: No cranial nerve deficit.   Psychiatric:         Mood and Affect: Mood normal.         Behavior: Behavior normal.         Laboratory:  Recent Labs     09/06/20  0534   WBC 7.6   RBC 4.57*   HEMOGLOBIN 12.2*   HEMATOCRIT 39.9*   MCV 87.3   MCH 26.7*   MCHC 30.6*   RDW 50.4*   PLATELETCT 175   MPV 11.1     Recent Labs     09/06/20  0534   SODIUM 133*   POTASSIUM 4.5   CHLORIDE 99   CO2 22   GLUCOSE 168*   BUN 30*   CREATININE 1.14   CALCIUM 9.5     Recent Labs     09/06/20  0534   ALTSGPT 18   ASTSGOT 18   ALKPHOSPHAT 103*   TBILIRUBIN 0.9   GLUCOSE 168*         Recent Labs     09/06/20  0534   NTPROBNP 3221*         Recent Labs     09/06/20  0534   TROPONINT 44*       Imaging:  DX-CHEST-PORTABLE (1 VIEW)   Final Result      Mild-moderate pulmonary edema with associated enlargement of the cardiac silhouette      EC-ECHOCARDIOGRAM COMPLETE W/O CONT    (Results Pending)         Assessment/Plan:  I anticipate this patient will require at least two midnights for appropriate medical management, necessitating inpatient admission.    *  Acute on chronic diastolic heart failure (HCC)- (present on admission)  Assessment & Plan  Acute on chronic heart failure, chest x-ray showing pulmonary edema, started on IV Lasix, repeat echo, started cardioprotective medications.    Class 2 obesity due to excess calories without serious comorbidity with body mass index (BMI) of 37.0 to 37.9 in adult- (present on admission)  Assessment & Plan  Body mass index is 58.93 kg/m².  Needs to lose weight    Paroxysmal A-fib (HCC)- (present on admission)  Assessment & Plan  A. fib with RVR due to noncompliant with medications, started on metoprolol, as per previous discharge summary patient is not on any anticoagulation due to risk of falls and bleeding, continue telemetry, metoprolol as needed.  Follow-up echo    Essential hypertension- (present on admission)  Assessment & Plan  Started on lisinopril and metoprolol    GERD (gastroesophageal reflux disease)- (present on admission)  Assessment & Plan  Continue monitoring    Type 2 diabetes mellitus, without long-term current use of insulin, with peripheral neuropathy (HCC)- (present on admission)  Assessment & Plan  Continue sliding scale insulin    DVT prophylaxis Lovenox

## 2020-09-06 NOTE — ED PROVIDER NOTES
ED Provider Note    CHIEF COMPLAINT  Chief Complaint   Patient presents with   • Chest Pain     pt off meds for around 3 days, pt has hx of a-fib, felt palpitations as well as chest pain and SOB. pt states he also noticed increased swelling in legs   • Shortness of Breath       HPI  Sebastián Castro is a 55 y.o. male who presents with chest pain and shortness of breath.  Patient has history of CHF and hypertension as well as atrial fibrillation.  Stopped taking medications 3 days ago.  Chest pain started last night with a racing heart.  Feeling shortness of breath with significant increase in swelling in his legs.  His shortness of breath is primarily when he lays down flat.  He has not had cough, fever, congestion, runny nose.  Denies abdominal pain nausea vomiting.    REVIEW OF SYSTEMS  As per HPI, otherwise a 10 point review of systems is negative    PAST MEDICAL HISTORY  Past Medical History:   Diagnosis Date   • A-fib (HCC)    • Chest pain    • Congestive heart failure (HCC)    • Diabetes (HCC)    • Diabetic neuropathy (HCC)    • Hypercholesterolemia    • Hypertension    • Morbid obesity (HCC)    • Noncompliance    • Normal cardiac stress test    • Orthostatic hypotension        SOCIAL HISTORY  Social History     Tobacco Use   • Smoking status: Never Smoker   • Smokeless tobacco: Never Used   Substance Use Topics   • Alcohol use: No   • Drug use: No       SURGICAL HISTORY  Past Surgical History:   Procedure Laterality Date   • ZZZ CARDIAC CATH  07/21/2018    EF 45%, normal coronaries.   • IRRIGATION & DEBRIDEMENT ORTHO Right 2/19/2018    Procedure: IRRIGATION & DEBRIDEMENT ORTHO-FOOT;  Surgeon: Kirby Lopez M.D.;  Location: [facility];  Service: Orthopedics   • TOE AMPUTATION Right 1/17/2018    Procedure: TOE AMPUTATION-1ST RAY;  Surgeon: Doug Delong M.D.;  Location: [facility];  Service: Orthopedics   • TOE AMPUTATION Right 11/10/2017    Procedure: TOE AMPUTATION;  Surgeon:  Osorio Leo M.D.;  Location: [facility];  Service: Orthopedics       CURRENT MEDICATIONS  Home Medications    **Home medications have not yet been reviewed for this encounter**         ALLERGIES  Allergies   Allergen Reactions   • Daptomycin Rash     Body rash  RXN=1/2018     • Pcn [Penicillins] Hives and Rash      Rash & hives  RXN=since a kid   • Unasyn [Ampicillin-Sulbactam Sodium] Hives, Itching and Swelling   • Vancomycin Rash     Red man syndrome       PHYSICAL EXAM  VITAL SIGNS: /75   Pulse 94   Temp 36.7 °C (98.1 °F) (Temporal)   Resp 16   Ht 1.829 m (6')   Wt (!) 136.1 kg (300 lb)   SpO2 92%   BMI 40.69 kg/m²    Constitutional: Awake and alert, morbidly obese  HENT:  Atraumatic, Normocephalic.Oropharynx moist mucus membranes, Nose normal inspection.   Eyes: Normal inspection  Neck: Supple  Cardiovascular: Normal heart rate, Normal rhythm.  Symmetric peripheral pulses.   Thorax & Lungs: No respiratory distress, No wheezing, No rales, No rhonchi, No chest tenderness.   Abdomen: Morbidly obese  Skin: Warm, Dry, No rash.   Back: No tenderness, No CVA tenderness.   Extremities: Significant symmetric pedal edema  Neurologic: Grossly normal   Psychiatric: Anxious appearing    RADIOLOGY/PROCEDURES  DX-CHEST-PORTABLE (1 VIEW)   Final Result      Mild-moderate pulmonary edema with associated enlargement of the cardiac silhouette      EC-ECHOCARDIOGRAM COMPLETE W/O CONT    (Results Pending)        Imaging is interpreted by radiologist    Labs:  Results for orders placed or performed during the hospital encounter of 09/06/20   CBC with Differential   Result Value Ref Range    WBC 7.6 4.8 - 10.8 K/uL    RBC 4.57 (L) 4.70 - 6.10 M/uL    Hemoglobin 12.2 (L) 14.0 - 18.0 g/dL    Hematocrit 39.9 (L) 42.0 - 52.0 %    MCV 87.3 81.4 - 97.8 fL    MCH 26.7 (L) 27.0 - 33.0 pg    MCHC 30.6 (L) 33.7 - 35.3 g/dL    RDW 50.4 (H) 35.9 - 50.0 fL    Platelet Count 175 164 - 446 K/uL    MPV 11.1 9.0 - 12.9 fL     Neutrophils-Polys 69.80 44.00 - 72.00 %    Lymphocytes 20.00 (L) 22.00 - 41.00 %    Monocytes 6.40 0.00 - 13.40 %    Eosinophils 3.00 0.00 - 6.90 %    Basophils 0.50 0.00 - 1.80 %    Immature Granulocytes 0.30 0.00 - 0.90 %    Nucleated RBC 0.00 /100 WBC    Neutrophils (Absolute) 5.31 1.82 - 7.42 K/uL    Lymphs (Absolute) 1.52 1.00 - 4.80 K/uL    Monos (Absolute) 0.49 0.00 - 0.85 K/uL    Eos (Absolute) 0.23 0.00 - 0.51 K/uL    Baso (Absolute) 0.04 0.00 - 0.12 K/uL    Immature Granulocytes (abs) 0.02 0.00 - 0.11 K/uL    NRBC (Absolute) 0.00 K/uL   Complete Metabolic Panel (CMP)   Result Value Ref Range    Sodium 133 (L) 135 - 145 mmol/L    Potassium 4.5 3.6 - 5.5 mmol/L    Chloride 99 96 - 112 mmol/L    Co2 22 20 - 33 mmol/L    Anion Gap 12.0 7.0 - 16.0    Glucose 168 (H) 65 - 99 mg/dL    Bun 30 (H) 8 - 22 mg/dL    Creatinine 1.14 0.50 - 1.40 mg/dL    Calcium 9.5 8.5 - 10.5 mg/dL    AST(SGOT) 18 12 - 45 U/L    ALT(SGPT) 18 2 - 50 U/L    Alkaline Phosphatase 103 (H) 30 - 99 U/L    Total Bilirubin 0.9 0.1 - 1.5 mg/dL    Albumin 4.0 3.2 - 4.9 g/dL    Total Protein 7.6 6.0 - 8.2 g/dL    Globulin 3.6 (H) 1.9 - 3.5 g/dL    A-G Ratio 1.1 g/dL   Troponin   Result Value Ref Range    Troponin T 44 (H) 6 - 19 ng/L   proBrain Natriuretic Peptide, NT   Result Value Ref Range    NT-proBNP 3221 (H) 0 - 125 pg/mL   COVID/SARS CoV-2 PCR    Specimen: Nasopharyngeal; Respirate   Result Value Ref Range    COVID Order Status Received    SARS-CoV-2, PCR (In-House)   Result Value Ref Range    SARS-CoV-2 Source NP Swab     SARS-CoV-2 by PCR NotDetected    ESTIMATED GFR   Result Value Ref Range    GFR If African American >60 >60 mL/min/1.73 m 2    GFR If Non African American >60 >60 mL/min/1.73 m 2   TROPONIN   Result Value Ref Range    Troponin T 43 (H) 6 - 19 ng/L   MAGNESIUM   Result Value Ref Range    Magnesium 2.0 1.5 - 2.5 mg/dL   EKG   Result Value Ref Range    Report       [facility] Emergency Dept.    Test  Date:  2020  Pt Name:    EMELY FLORES                Department: ER  MRN:        [id number]                      Room:        09  Gender:     Male                         Technician: [id number]  :        1965                   Requested By:ER TRIAGE PROTOCOL  Order #:    [id number]                    Reading MD:Akira    Measurements  Intervals                                Axis  Rate:       120                          P:  WI:                                      QRS:        -11  QRSD:       94                           T:          45  QT:         356  QTc:        503    Interpretive Statements  ATRIAL FIBRILLATION, V-RATE    LOW VOLTAGE IN FRONTAL LEADS  LATE PRECORDIAL R/S TRANSITION  MINIMAL ST ELEVATION, ANTEROLATERAL LEADS- non diagnostic  PROLONGED QT INTERVAL  Compared to ECG 10/19/2019 11:18:25  ST (T wave) deviation now present  Prolonged QT interval now present  Left-axis deviation no longer present     EKG (NOW)   Result Value Ref Range    Report       [facility] Emergency Dept.    Test Date:  2020  Pt Name:    EMELY FLORES                Department: ER  MRN:        [id number]                      Room:       [facility]  Gender:     Male                         Technician: [id number]  :        1965                   Requested By:PAULA THOMAS  Order #:    [id number]                    Reading MD: Akira    Measurements  Intervals                                Axis  Rate:       111                          P:  WI:                                      QRS:        -2  QRSD:       96                           T:          49  QT:         376  QTc:        511    Interpretive Statements  ATRIAL FIBRILLATION, V-RATE    LOW VOLTAGE IN FRONTAL LEADS  PROLONGED QT INTERVAL  Compared to ECG 2020 05:33:25  ST (T wave) deviation no longer present         Medications   aspirin (ASA) chewable tab 324 mg (324 mg Oral Refused 20 0615)   labetalol  (NORMODYNE/TRANDATE) injection 10 mg (10 mg Intravenous Given 9/6/20 0552)   furosemide (LASIX) injection 20 mg (20 mg Intravenous Given 9/6/20 0551)         COURSE & MEDICAL DECISION MAKING  Patient presents with history and physical consistent with congestive heart failure related to noncompliance.  He had chest discomfort.  He was given aspirin.  Given nitroglycerin and Lasix.  Vital signs improved.  Heart rate controlled.  Data returned as above.  He will need to be admitted to the hospital for further diuresis and treatment.  Will need to trend troponins.  Hospitalist was paged for admission.    FINAL IMPRESSION  1.  Congestive heart failure with acute volume overload  2.  Hypertensive crisis  3.  Elevated troponin  4.  Atrial fibrillation with rapid ventricular response      This dictation was created using voice recognition software. The accuracy of the dictation is limited to the abilities of the software.  The nursing notes were reviewed and certain aspects of this information were incorporated into this note.      Electronically signed by: Long Champion M.D., 9/6/2020 6:36 AM

## 2020-09-06 NOTE — ASSESSMENT & PLAN NOTE
-Continue sliding scale insulin coverage while in-house. Accu-Cheks before meals and at bedtime. Goal to keep BG between 140-180 per 2019 ADA guidelines.

## 2020-09-06 NOTE — ED NOTES
Labs collected and sent. Pt states 4/10 nagging chest pain. Pt states he does not remember what meds he takes except for lasix and gabapentin

## 2020-09-06 NOTE — ED NOTES
UNABLE to address med rec at this time,   Pt does not know what medications he takes, pt's pharmacy closed.

## 2020-09-06 NOTE — ASSESSMENT & PLAN NOTE
- Body mass index is 58.9 kg/m²..  - Counseled on weight loss.  - outpatient referral for outpatient weight management.

## 2020-09-06 NOTE — ASSESSMENT & PLAN NOTE
-Maintaining fair control.  Continue lisinopril and metoprolol.  He is also on Lasix, and increased dose of Aldactone today which should help with blood pressure control.  Monitor blood pressure trend closely.

## 2020-09-06 NOTE — ED TRIAGE NOTES
Chief Complaint   Patient presents with   • Chest Pain     pt off meds for around 3 days, pt has hx of a-fib, felt palpitations as well as chest pain and SOB. pt states he also noticed increased swelling in legs   • Shortness of Breath         Pt BIB EMS from group home for above complaints. Pt states feeling slight palpitations. Pt has hx of CHF, a-fib, HTN and neuropathy    BP (!) 180/100   Pulse (!) 133   Temp 36.7 °C (98.1 °F) (Temporal)   Resp 20   Ht 1.829 m (6')   Wt (!) 136.1 kg (300 lb)   SpO2 97%   BMI 40.69 kg/m²

## 2020-09-06 NOTE — PROGRESS NOTES
Bilateral lower extremities dry, flaky, and dusky.  Abdominal folds red, moist, excoriated. Blanchable redness to buttocks.  Patient placed on bariatric bed.  Interdry in place under abdominal folds.

## 2020-09-06 NOTE — PROGRESS NOTES
This is 45-year-old male with a past medical history significant for diabetes, hypertension, atrial fibrillation, secondary hypercoagulable state but not on anticoagulation, dyslipidemia, history of syncope in the past presented to ER with a complaint of chest pain.  Patient reports that he has been off of medication for the last 3 days and felt palpitation and shortness of breath associated with chest pain.    Upon presentation he is noted to have mildly elevated troponin of 44, elevated BNP at 3221, tachycardic with a heart rate of 133 and hypertensive with a systolic blood pressure of 180, diastolic 100.    She is asked to be admitted by Dr. Champion  1.  Management of chest pain serial troponin  2.  A. fib with RVR; now rate is well controlled with labetolol  3.  Heart failure with elevated BNP of 3K , will need diuresis     Dr Mcknight will be admitting , placing initially ADD orders and H&P

## 2020-09-07 ENCOUNTER — APPOINTMENT (OUTPATIENT)
Dept: CARDIOLOGY | Facility: MEDICAL CENTER | Age: 55
DRG: 292 | End: 2020-09-07
Attending: HOSPITALIST
Payer: MEDICAID

## 2020-09-07 LAB
ALBUMIN SERPL BCP-MCNC: 3.5 G/DL (ref 3.2–4.9)
ALBUMIN/GLOB SERPL: 1 G/DL
ALP SERPL-CCNC: 80 U/L (ref 30–99)
ALT SERPL-CCNC: 17 U/L (ref 2–50)
ANION GAP SERPL CALC-SCNC: 14 MMOL/L (ref 7–16)
AST SERPL-CCNC: 19 U/L (ref 12–45)
BILIRUB SERPL-MCNC: 1 MG/DL (ref 0.1–1.5)
BUN SERPL-MCNC: 27 MG/DL (ref 8–22)
CALCIUM SERPL-MCNC: 9.1 MG/DL (ref 8.5–10.5)
CHLORIDE SERPL-SCNC: 99 MMOL/L (ref 96–112)
CO2 SERPL-SCNC: 24 MMOL/L (ref 20–33)
CREAT SERPL-MCNC: 1.21 MG/DL (ref 0.5–1.4)
ERYTHROCYTE [DISTWIDTH] IN BLOOD BY AUTOMATED COUNT: 51.8 FL (ref 35.9–50)
GLOBULIN SER CALC-MCNC: 3.4 G/DL (ref 1.9–3.5)
GLUCOSE BLD-MCNC: 122 MG/DL (ref 65–99)
GLUCOSE BLD-MCNC: 139 MG/DL (ref 65–99)
GLUCOSE BLD-MCNC: 146 MG/DL (ref 65–99)
GLUCOSE SERPL-MCNC: 162 MG/DL (ref 65–99)
HCT VFR BLD AUTO: 40.9 % (ref 42–52)
HGB BLD-MCNC: 11.9 G/DL (ref 14–18)
LV EJECT FRACT  99904: 30
LV EJECT FRACT MOD 2C 99903: 31.17
LV EJECT FRACT MOD 4C 99902: 31.78
LV EJECT FRACT MOD BP 99901: 32.66
MCH RBC QN AUTO: 26 PG (ref 27–33)
MCHC RBC AUTO-ENTMCNC: 29.1 G/DL (ref 33.7–35.3)
MCV RBC AUTO: 89.3 FL (ref 81.4–97.8)
PLATELET # BLD AUTO: 161 K/UL (ref 164–446)
PMV BLD AUTO: 11.1 FL (ref 9–12.9)
POTASSIUM SERPL-SCNC: 4.8 MMOL/L (ref 3.6–5.5)
PROT SERPL-MCNC: 6.9 G/DL (ref 6–8.2)
RBC # BLD AUTO: 4.58 M/UL (ref 4.7–6.1)
SODIUM SERPL-SCNC: 137 MMOL/L (ref 135–145)
WBC # BLD AUTO: 7.2 K/UL (ref 4.8–10.8)

## 2020-09-07 PROCEDURE — 93306 TTE W/DOPPLER COMPLETE: CPT | Mod: 26 | Performed by: INTERNAL MEDICINE

## 2020-09-07 PROCEDURE — 700117 HCHG RX CONTRAST REV CODE 255: Performed by: HOSPITALIST

## 2020-09-07 PROCEDURE — 99233 SBSQ HOSP IP/OBS HIGH 50: CPT | Performed by: INTERNAL MEDICINE

## 2020-09-07 PROCEDURE — 700102 HCHG RX REV CODE 250 W/ 637 OVERRIDE(OP): Performed by: HOSPITALIST

## 2020-09-07 PROCEDURE — 82962 GLUCOSE BLOOD TEST: CPT

## 2020-09-07 PROCEDURE — 80053 COMPREHEN METABOLIC PANEL: CPT

## 2020-09-07 PROCEDURE — A9270 NON-COVERED ITEM OR SERVICE: HCPCS | Performed by: HOSPITALIST

## 2020-09-07 PROCEDURE — 770020 HCHG ROOM/CARE - TELE (206)

## 2020-09-07 PROCEDURE — 93306 TTE W/DOPPLER COMPLETE: CPT

## 2020-09-07 PROCEDURE — 85027 COMPLETE CBC AUTOMATED: CPT

## 2020-09-07 PROCEDURE — 36415 COLL VENOUS BLD VENIPUNCTURE: CPT

## 2020-09-07 PROCEDURE — 700111 HCHG RX REV CODE 636 W/ 250 OVERRIDE (IP): Performed by: HOSPITALIST

## 2020-09-07 RX ADMIN — LISINOPRIL 10 MG: 5 TABLET ORAL at 05:22

## 2020-09-07 RX ADMIN — METOPROLOL TARTRATE 25 MG: 25 TABLET, FILM COATED ORAL at 18:27

## 2020-09-07 RX ADMIN — FUROSEMIDE 20 MG: 10 INJECTION, SOLUTION INTRAMUSCULAR; INTRAVENOUS at 05:21

## 2020-09-07 RX ADMIN — FUROSEMIDE 20 MG: 10 INJECTION, SOLUTION INTRAMUSCULAR; INTRAVENOUS at 18:27

## 2020-09-07 RX ADMIN — ENOXAPARIN SODIUM 60 MG: 60 INJECTION SUBCUTANEOUS at 18:28

## 2020-09-07 RX ADMIN — ASPIRIN 81 MG: 81 TABLET, COATED ORAL at 05:19

## 2020-09-07 RX ADMIN — ENOXAPARIN SODIUM 60 MG: 60 INJECTION SUBCUTANEOUS at 05:19

## 2020-09-07 RX ADMIN — POTASSIUM CHLORIDE 20 MEQ: 20 TABLET, EXTENDED RELEASE ORAL at 05:20

## 2020-09-07 RX ADMIN — HUMAN ALBUMIN MICROSPHERES AND PERFLUTREN 3 ML: 10; .22 INJECTION, SOLUTION INTRAVENOUS at 12:27

## 2020-09-07 RX ADMIN — METOPROLOL TARTRATE 25 MG: 25 TABLET, FILM COATED ORAL at 05:19

## 2020-09-07 ASSESSMENT — FIBROSIS 4 INDEX: FIB4 SCORE: 1.63

## 2020-09-07 ASSESSMENT — ENCOUNTER SYMPTOMS
NAUSEA: 0
BLURRED VISION: 0
SORE THROAT: 0
HEADACHES: 0
VOMITING: 0
FEVER: 0
ABDOMINAL PAIN: 0
DIARRHEA: 0
SHORTNESS OF BREATH: 1
FALLS: 0
PALPITATIONS: 1
DIZZINESS: 0
COUGH: 0
NERVOUS/ANXIOUS: 0
CONSTIPATION: 0
DEPRESSION: 0
CHILLS: 0
WEAKNESS: 0

## 2020-09-07 NOTE — CARE PLAN
Pt is feeling much better after compazine admin, pt verbalizes understanding of treatment and plan of care, pt denies pain, pt remains free from falls and injury.

## 2020-09-07 NOTE — PROGRESS NOTES
[facility] Medicine Daily Progress Note    Date of Service  9/7/2020    Chief Complaint  55 y.o. male admitted 9/6/2020 with shortness of breath, lower extremity edema    Hospital Course    Mr. Sebastián Castro is a 55 y.o. male with a past medical history significant for obesity, atrial fibrillation, heart failure, diabetes who presented on 9/6/2020 with shortness of breath, lower extremity edema, palpitations.  Shortness of breath is worse lying flat.  Patient ran out of his medication 3 days prior to presentation.  Cannot member his meds however and states that he is on a water pill which is yet to be confirmed.  Denies dietary indiscretion.  On presentation was found to be in atrial fibrillation with rapid regular response.  Chest x-ray demonstrated pulmonary edema.  Troponins mildly elevated and peaked at 44.        Interval Problem Update  Patient was seen and examined at bedside.  I have personally reviewed vitals, labs, and imaging.    9/7.  Afebrile.  Atrial fibrillation with better rate control.  Initially hypertensive but this is improved.  On room air.  Episode of emesis last night questionable bilious.  Denies fever, chills, chest pains.  He does support some palpitations.  States shortness of breath is improved.    Consultants/Specialty  None    Code Status  Full Code    Disposition  PT/OT.    Review of Systems  Review of Systems   Constitutional: Negative for chills and fever.   HENT: Negative for congestion and sore throat.    Eyes: Negative for blurred vision.   Respiratory: Positive for shortness of breath. Negative for cough.    Cardiovascular: Positive for palpitations and leg swelling. Negative for chest pain.   Gastrointestinal: Negative for abdominal pain, constipation, diarrhea, nausea and vomiting.   Genitourinary: Negative for dysuria, frequency and urgency.   Musculoskeletal: Negative for falls.   Skin: Negative for rash.   Neurological: Negative for dizziness, weakness and headaches.    Psychiatric/Behavioral: Negative for depression. The patient is not nervous/anxious.    All other systems reviewed and are negative.       Physical Exam  Temp:  [36 °C (96.8 °F)-37.1 °C (98.8 °F)] 36.4 °C (97.6 °F)  Pulse:  [] 72  Resp:  [16-20] 16  BP: (115-156)/() 126/78  SpO2:  [90 %-95 %] 91 %    Physical Exam  Vitals signs and nursing note reviewed.   Constitutional:       General: He is not in acute distress.     Appearance: Normal appearance. He is obese.   HENT:      Head: Normocephalic and atraumatic.      Nose: Nose normal.      Mouth/Throat:      Mouth: Mucous membranes are moist.      Pharynx: Oropharynx is clear.   Eyes:      Extraocular Movements: Extraocular movements intact.      Conjunctiva/sclera: Conjunctivae normal.   Neck:      Musculoskeletal: Normal range of motion and neck supple.   Cardiovascular:      Rate and Rhythm: Normal rate. Rhythm irregular.      Pulses: Normal pulses.      Heart sounds: Normal heart sounds. No murmur. No friction rub. No gallop.    Pulmonary:      Effort: Pulmonary effort is normal. No respiratory distress.      Breath sounds: Rales present. No wheezing.   Chest:      Chest wall: No tenderness.   Abdominal:      General: Abdomen is flat. Bowel sounds are normal. There is no distension.      Palpations: Abdomen is soft. There is no mass.      Tenderness: There is no abdominal tenderness. There is no guarding.   Musculoskeletal: Normal range of motion.      Right lower leg: Edema present.      Left lower leg: Edema present.   Skin:     General: Skin is warm.      Capillary Refill: Capillary refill takes less than 2 seconds.   Neurological:      General: No focal deficit present.      Mental Status: He is alert and oriented to person, place, and time. Mental status is at baseline.      Cranial Nerves: No cranial nerve deficit.      Motor: No weakness.   Psychiatric:         Mood and Affect: Mood normal.         Behavior: Behavior normal.          Fluids    Intake/Output Summary (Last 24 hours) at 9/7/2020 0837  Last data filed at 9/7/2020 0800  Gross per 24 hour   Intake 480 ml   Output 1300 ml   Net -820 ml       Laboratory  Recent Labs     09/06/20  0534 09/07/20  0249   WBC 7.6 7.2   RBC 4.57* 4.58*   HEMOGLOBIN 12.2* 11.9*   HEMATOCRIT 39.9* 40.9*   MCV 87.3 89.3   MCH 26.7* 26.0*   MCHC 30.6* 29.1*   RDW 50.4* 51.8*   PLATELETCT 175 161*   MPV 11.1 11.1     Recent Labs     09/06/20  0534 09/06/20 2024 09/07/20  0249   SODIUM 133* 136 137   POTASSIUM 4.5 4.6 4.8   CHLORIDE 99 100 99   CO2 22 19* 24   GLUCOSE 168* 175* 162*   BUN 30* 28* 27*   CREATININE 1.14 1.17 1.21   CALCIUM 9.5 9.4 9.1                   Imaging  DX-CHEST-PORTABLE (1 VIEW)   Final Result      Mild-moderate pulmonary edema with associated enlargement of the cardiac silhouette      EC-ECHOCARDIOGRAM COMPLETE W/ CONT    (Results Pending)        Assessment/Plan  * Acute on chronic diastolic heart failure (HCC)- (present on admission)  Assessment & Plan  Acute on chronic heart failure, chest x-ray showing pulmonary edema, started on IV Lasix, repeat echo, started cardioprotective medications.  Daily weights, fluid restriction, strict ins and outs  Likely secondary to atrial fibrillation with RVR    Class 2 obesity due to excess calories without serious comorbidity with body mass index (BMI) of 37.0 to 37.9 in adult- (present on admission)  Assessment & Plan  Body mass index is 58.93 kg/m².  Counseled about diet and exercise    Paroxysmal A-fib (HCC)- (present on admission)  Assessment & Plan  A. fib with RVR due to noncompliant with medications, started on metoprolol, as per previous discharge summary patient is not on any anticoagulation due to risk of falls and bleeding, continue telemetry, metoprolol as needed.  Follow-up echo    Patient states he falls at [facility] with Dr. Sommers?  Who apparently took him off anticoagulation 6 months ago.    Essential hypertension- (present  on admission)  Assessment & Plan  Started on lisinopril and metoprolol    GERD (gastroesophageal reflux disease)- (present on admission)  Assessment & Plan  Continue monitoring    Type 2 diabetes mellitus, without long-term current use of insulin, with peripheral neuropathy (HCC)- (present on admission)  Assessment & Plan  Lab Results   Component Value Date/Time    HBA1C 7.7 (H) 10/22/2019 0823    HBA1C 5.9 (H) 12/28/2018 1845    HBA1C 6.3 (H) 10/23/2018 0802     Results from last 7 days   Lab Units 09/07/20  0820 09/06/20 2029 09/06/20  1720   ACCU CHECK GLUCOSE 788 mg/dL 139* 164* 136*     I have ordered insulin sliding scale with D50 and glucagon for hypoglycemia per protocol.  Diabetic diet  Diabetic education       VTE prophylaxis: Enoxaparin

## 2020-09-07 NOTE — DIETARY
NUTRITION SERVICES: BMI - Pt with BMI >40 (=Body mass index is 58.93 kg/m².), morbid obesity. Weight loss counseling not appropriate in acute care setting.     Pt with CHF  noted with 1+ abdominal edema and 4+ LLE/RLE edema.     RECOMMEND - Referral to outpatient nutrition services for weight management after D/C.

## 2020-09-08 LAB
ANION GAP SERPL CALC-SCNC: 11 MMOL/L (ref 7–16)
BASOPHILS # BLD AUTO: 1.7 % (ref 0–1.8)
BASOPHILS # BLD: 0.12 K/UL (ref 0–0.12)
BUN SERPL-MCNC: 32 MG/DL (ref 8–22)
CALCIUM SERPL-MCNC: 9.1 MG/DL (ref 8.5–10.5)
CHLORIDE SERPL-SCNC: 98 MMOL/L (ref 96–112)
CO2 SERPL-SCNC: 25 MMOL/L (ref 20–33)
CREAT SERPL-MCNC: 1.26 MG/DL (ref 0.5–1.4)
EOSINOPHIL # BLD AUTO: 0.06 K/UL (ref 0–0.51)
EOSINOPHIL NFR BLD: 0.8 % (ref 0–6.9)
ERYTHROCYTE [DISTWIDTH] IN BLOOD BY AUTOMATED COUNT: 51.8 FL (ref 35.9–50)
GLUCOSE BLD-MCNC: 130 MG/DL (ref 65–99)
GLUCOSE BLD-MCNC: 132 MG/DL (ref 65–99)
GLUCOSE BLD-MCNC: 135 MG/DL (ref 65–99)
GLUCOSE BLD-MCNC: 153 MG/DL (ref 65–99)
GLUCOSE SERPL-MCNC: 140 MG/DL (ref 65–99)
HCT VFR BLD AUTO: 42.6 % (ref 42–52)
HGB BLD-MCNC: 12.6 G/DL (ref 14–18)
LYMPHOCYTES # BLD AUTO: 1.67 K/UL (ref 1–4.8)
LYMPHOCYTES NFR BLD: 22.9 % (ref 22–41)
MAGNESIUM SERPL-MCNC: 2.1 MG/DL (ref 1.5–2.5)
MANUAL DIFF BLD: NORMAL
MCH RBC QN AUTO: 26.2 PG (ref 27–33)
MCHC RBC AUTO-ENTMCNC: 29.6 G/DL (ref 33.7–35.3)
MCV RBC AUTO: 88.6 FL (ref 81.4–97.8)
MONOCYTES # BLD AUTO: 0.68 K/UL (ref 0–0.85)
MONOCYTES NFR BLD AUTO: 9.3 % (ref 0–13.4)
MORPHOLOGY BLD-IMP: NORMAL
NEUTROPHILS # BLD AUTO: 4.77 K/UL (ref 1.82–7.42)
NEUTROPHILS NFR BLD: 65.3 % (ref 44–72)
NRBC # BLD AUTO: 0 K/UL
NRBC BLD-RTO: 0 /100 WBC
OVALOCYTES BLD QL SMEAR: NORMAL
PHOSPHATE SERPL-MCNC: 3.6 MG/DL (ref 2.5–4.5)
PLATELET # BLD AUTO: 162 K/UL (ref 164–446)
PLATELET BLD QL SMEAR: NORMAL
PMV BLD AUTO: 11.3 FL (ref 9–12.9)
POIKILOCYTOSIS BLD QL SMEAR: NORMAL
POLYCHROMASIA BLD QL SMEAR: NORMAL
POTASSIUM SERPL-SCNC: 4.5 MMOL/L (ref 3.6–5.5)
RBC # BLD AUTO: 4.81 M/UL (ref 4.7–6.1)
RBC BLD AUTO: PRESENT
SODIUM SERPL-SCNC: 134 MMOL/L (ref 135–145)
T4 FREE SERPL-MCNC: 1.48 NG/DL (ref 0.93–1.7)
TSH SERPL DL<=0.005 MIU/L-ACNC: 8.74 UIU/ML (ref 0.38–5.33)
WBC # BLD AUTO: 7.3 K/UL (ref 4.8–10.8)

## 2020-09-08 PROCEDURE — 700102 HCHG RX REV CODE 250 W/ 637 OVERRIDE(OP): Performed by: HOSPITALIST

## 2020-09-08 PROCEDURE — 83735 ASSAY OF MAGNESIUM: CPT

## 2020-09-08 PROCEDURE — 700111 HCHG RX REV CODE 636 W/ 250 OVERRIDE (IP): Performed by: HOSPITALIST

## 2020-09-08 PROCEDURE — 36415 COLL VENOUS BLD VENIPUNCTURE: CPT

## 2020-09-08 PROCEDURE — 700102 HCHG RX REV CODE 250 W/ 637 OVERRIDE(OP): Performed by: INTERNAL MEDICINE

## 2020-09-08 PROCEDURE — 84439 ASSAY OF FREE THYROXINE: CPT

## 2020-09-08 PROCEDURE — 99232 SBSQ HOSP IP/OBS MODERATE 35: CPT | Performed by: INTERNAL MEDICINE

## 2020-09-08 PROCEDURE — A9270 NON-COVERED ITEM OR SERVICE: HCPCS | Performed by: INTERNAL MEDICINE

## 2020-09-08 PROCEDURE — 770020 HCHG ROOM/CARE - TELE (206)

## 2020-09-08 PROCEDURE — 85027 COMPLETE CBC AUTOMATED: CPT

## 2020-09-08 PROCEDURE — 84443 ASSAY THYROID STIM HORMONE: CPT

## 2020-09-08 PROCEDURE — 85007 BL SMEAR W/DIFF WBC COUNT: CPT

## 2020-09-08 PROCEDURE — 82962 GLUCOSE BLOOD TEST: CPT | Mod: 91

## 2020-09-08 PROCEDURE — A9270 NON-COVERED ITEM OR SERVICE: HCPCS | Performed by: HOSPITALIST

## 2020-09-08 PROCEDURE — 97166 OT EVAL MOD COMPLEX 45 MIN: CPT

## 2020-09-08 PROCEDURE — 84100 ASSAY OF PHOSPHORUS: CPT

## 2020-09-08 PROCEDURE — 97161 PT EVAL LOW COMPLEX 20 MIN: CPT

## 2020-09-08 PROCEDURE — 80048 BASIC METABOLIC PNL TOTAL CA: CPT

## 2020-09-08 RX ORDER — SERTRALINE HYDROCHLORIDE 100 MG/1
100 TABLET, FILM COATED ORAL DAILY
Status: ON HOLD | COMMUNITY
End: 2020-12-12

## 2020-09-08 RX ORDER — HYDROXYZINE 50 MG/1
50 TABLET, FILM COATED ORAL EVERY 6 HOURS PRN
COMMUNITY
End: 2020-11-03

## 2020-09-08 RX ORDER — OLANZAPINE 7.5 MG/1
7.5 TABLET, FILM COATED ORAL
Status: ON HOLD | COMMUNITY
End: 2020-12-12

## 2020-09-08 RX ORDER — SPIRONOLACTONE 25 MG/1
12.5 TABLET ORAL
Status: DISCONTINUED | OUTPATIENT
Start: 2020-09-08 | End: 2020-09-09

## 2020-09-08 RX ORDER — OLANZAPINE 5 MG/1
7.5 TABLET ORAL EVERY EVENING
Status: DISCONTINUED | OUTPATIENT
Start: 2020-09-08 | End: 2020-09-10 | Stop reason: HOSPADM

## 2020-09-08 RX ADMIN — ACETAMINOPHEN 650 MG: 325 TABLET, FILM COATED ORAL at 15:19

## 2020-09-08 RX ADMIN — ENOXAPARIN SODIUM 60 MG: 60 INJECTION SUBCUTANEOUS at 17:09

## 2020-09-08 RX ADMIN — POTASSIUM CHLORIDE 20 MEQ: 20 TABLET, EXTENDED RELEASE ORAL at 05:32

## 2020-09-08 RX ADMIN — ENOXAPARIN SODIUM 60 MG: 60 INJECTION SUBCUTANEOUS at 05:32

## 2020-09-08 RX ADMIN — LISINOPRIL 10 MG: 5 TABLET ORAL at 05:32

## 2020-09-08 RX ADMIN — INSULIN HUMAN 1 UNITS: 100 INJECTION, SOLUTION PARENTERAL at 17:17

## 2020-09-08 RX ADMIN — ASPIRIN 81 MG: 81 TABLET, COATED ORAL at 05:32

## 2020-09-08 RX ADMIN — METOPROLOL TARTRATE 25 MG: 25 TABLET, FILM COATED ORAL at 05:32

## 2020-09-08 RX ADMIN — OLANZAPINE 7.5 MG: 5 TABLET, FILM COATED ORAL at 17:09

## 2020-09-08 RX ADMIN — ACETAMINOPHEN 650 MG: 325 TABLET, FILM COATED ORAL at 09:21

## 2020-09-08 RX ADMIN — FUROSEMIDE 20 MG: 10 INJECTION, SOLUTION INTRAMUSCULAR; INTRAVENOUS at 05:32

## 2020-09-08 RX ADMIN — SERTRALINE HYDROCHLORIDE 125 MG: 100 TABLET ORAL at 12:44

## 2020-09-08 RX ADMIN — FUROSEMIDE 20 MG: 10 INJECTION, SOLUTION INTRAMUSCULAR; INTRAVENOUS at 15:19

## 2020-09-08 RX ADMIN — METOPROLOL TARTRATE 25 MG: 25 TABLET, FILM COATED ORAL at 17:09

## 2020-09-08 RX ADMIN — SPIRONOLACTONE 12.5 MG: 25 TABLET ORAL at 09:21

## 2020-09-08 ASSESSMENT — COGNITIVE AND FUNCTIONAL STATUS - GENERAL
DAILY ACTIVITIY SCORE: 15
HELP NEEDED FOR BATHING: A LOT
MOVING FROM LYING ON BACK TO SITTING ON SIDE OF FLAT BED: A LOT
WALKING IN HOSPITAL ROOM: A LITTLE
TOILETING: A LOT
STANDING UP FROM CHAIR USING ARMS: A LITTLE
MOBILITY SCORE: 14
DRESSING REGULAR UPPER BODY CLOTHING: A LITTLE
CLIMB 3 TO 5 STEPS WITH RAILING: A LOT
EATING MEALS: A LITTLE
SUGGESTED CMS G CODE MODIFIER MOBILITY: CL
PERSONAL GROOMING: A LITTLE
MOVING TO AND FROM BED TO CHAIR: A LOT
TURNING FROM BACK TO SIDE WHILE IN FLAT BAD: A LOT
SUGGESTED CMS G CODE MODIFIER DAILY ACTIVITY: CK
DRESSING REGULAR LOWER BODY CLOTHING: A LOT

## 2020-09-08 ASSESSMENT — PAIN DESCRIPTION - PAIN TYPE
TYPE: CHRONIC PAIN

## 2020-09-08 ASSESSMENT — GAIT ASSESSMENTS
DISTANCE (FEET): 20
GAIT LEVEL OF ASSIST: SUPERVISED
DEVIATION: INCREASED BASE OF SUPPORT;SHUFFLED GAIT;BRADYKINETIC

## 2020-09-08 ASSESSMENT — ACTIVITIES OF DAILY LIVING (ADL): TOILETING: INDEPENDENT

## 2020-09-08 NOTE — THERAPY
Physical Therapy   Initial Evaluation     Patient Name: Sebastián Castro  Age:  55 y.o., Sex:  male  Medical Record #: [id number]  Today's Date: 9/8/2020     Precautions: Fall Risk    Assessment  Patient is 55 y.o. male with a diagnosis of CHF exacerbation. PMH significant for obesity, afib, DM2, GERD. Pt completed all bed mobility and transfers with SPV. PT ambulated to and from the bathroom with close supervision and verbal cues. Pt reaching for furniture while ambulating and may benefit from SPC or FWW. Pt is limited by decreased activity tolerance, balance deficits, and fatigue. PT will cont while in acute care setting to address gait and stair negotiating.    Plan    Recommend Physical Therapy 3 times per week until therapy goals are met for the following treatments:  Community Re-integration, Gait Training, Neuro Re-Education / Balance, Self Care/Home Evaluation, Stair Training, Therapeutic Activities and Therapeutic Exercises    DC Equipment Recommendations: Unable to determine at this time  Discharge Recommendations: Recommend post-acute placement for additional physical therapy services prior to discharge home        09/08/20 1028   Vitals   O2 (LPM) 0   O2 Delivery Device None - Room Air   Vitals Comments 1L when sleeping   Prior Living Situation   Prior Services None   Housing / Facility Group Home   Steps Into Home 3   Steps In Home 13   Equipment Owned None   Lives with - Patient's Self Care Capacity Alone and Able to Care For Self   Comments Pt reported group home is really independent living. He cooks and cleans and is independent with bathing   Prior Level of Functional Mobility   Bed Mobility Independent   Transfer Status Independent   Ambulation Independent   Distance Ambulation (Feet)   (community)   Assistive Devices Used None   Stairs Independent   Comments indepenent but reported stairs were hard which he has to negotiate FOS to get to bed/bath   History of Falls   History of Falls Yes   Sensation  Lower Body   Lower Extremity Sensation   X   Comments chronic neuropathy   Gait Analysis   Gait Level Of Assist Supervised   Assistive Device None   Distance (Feet) 20   # of Times Distance was Traveled 2   Deviation Increased Base Of Support;Shuffled Gait;Bradykinetic   # of Stairs Climbed 0   Weight Bearing Status fwb   Comments slight increase WOB   Bed Mobility    Supine to Sit Supervised   Sit to Supine Supervised   Scooting Supervised   Functional Mobility   Sit to Stand Supervised   Toilet Transfers Supervised   Mobility in room no AD   Edema / Skin Assessment   Edema / Skin  Not Assessed   Comments B LE edema   Short Term Goals    Short Term Goal # 1 Pt will be able to ambulate 100ft with LRAD and SPV in 6tx in order to return to his group home   Short Term Goal # 2 Pt will be able to negotiate FOS with SPV in 6tx in order to return to group home   Anticipated Discharge Equipment and Recommendations   DC Equipment Recommendations Unable to determine at this time   Discharge Recommendations Recommend post-acute placement for additional physical therapy services prior to discharge home

## 2020-09-08 NOTE — THERAPY
Physical Therapy Contact Note    Patient Name: Sebastián Castro  Age:  55 y.o., Sex:  male  Medical Record #: [id number]  Today's Date: 9/8/2020 09/08/20 0725   Interdisciplinary Plan of Care Collaboration   IDT Collaboration with  Other (See Comments)  (Chart)   Collaboration Comments Active Bed Rest order. PT eval will be held until pt is cleared for OOB mobility

## 2020-09-08 NOTE — PROGRESS NOTES
Pt report received from night shift RN. Pt resting in bed at this time. Pt has complaint  of 4/10 chest pain that increases with palpation and deep breathing. Denies increased SOB, N/V. Bed is locked and in low position. Call light within reach.

## 2020-09-08 NOTE — CARE PLAN
Problem: Communication  Goal: The ability to communicate needs accurately and effectively will improve  Outcome: PROGRESSING AS EXPECTED  Note: Pt communicates needs effectively and calls for assistance appropriately.     Problem: Safety  Goal: Will remain free from injury  Outcome: PROGRESSING AS EXPECTED  Note: Pt verbalizes understanding of safety instructions.

## 2020-09-08 NOTE — PROGRESS NOTES
Hospital Medicine Daily Progress Note    Date of Service  9/8/2020    Chief Complaint  shortness of breath, lower extremity edema    Hospital Course    Mr. Sebastián Castro is a 55 y.o. male with a past medical history significant for obesity, atrial fibrillation, heart failure, and diabetes, who presented on 9/6/2020 with shortness of breath, orthopnea, lower extremity edema, and palpitations.  Apparently, patient ran out of his medication 3 days prior to presentation.  He denies dietary indiscretion.  On presentation was found to be in atrial fibrillation with rapid regular response.  Chest x-ray demonstrated pulmonary edema.  Troponins mildly elevated and peaked at 44.  Patient started on IV Lasix, along with beta-blocker, and ACE inhibitor.  Echocardiogram is ordered.  He was deemed to not be a candidate for anticoagulation due to risk of falls and bleeding.        Interval Problem Update  9/8/2020 - I reviewed the patient's chart. There were no significant overnight events. Remains hemodynamically stable and afebrile. Stable on RA.  No leukocytosis.  Hemoglobin is stable.  Sodium 134.  Creatinine 1.26, stable.  .  TSH is 8.74, with normal free T4.  Echocardiogram showed EF of 30%, with mild concentric left ventricular hypertrophy, mild mitral regurgitation, and RVSP of 40 mmHg.  He is diuresing well.    > I have personally seen and examined the patient today.  He is breathing better, although he states it still heavy when lying down.  Orthopnea is better.  Still with leg swelling, although that is improved.  Has reproducible chest tenderness precordially.  No nausea or vomiting.  No bowel movement changes.  No fevers or chills.      Consultants/Specialty  None    Code Status  Full Code    Disposition  Monitor on telemetry  Pending PT/OT evaluation    Review of Systems  Review of Systems        Pertinent positives/negatives as mentioned above.     A complete review of systems was personally done by me. All  other systems were negative.       Physical Exam  Temp:  [36 °C (96.8 °F)-37.1 °C (98.7 °F)] 36.2 °C (97.1 °F)  Pulse:  [] 91  Resp:  [16-19] 19  BP: (115-143)/(70-88) 115/77  SpO2:  [89 %-93 %] 93 %    Physical Exam  Vitals signs and nursing note reviewed.   Constitutional:       General: He is not in acute distress.     Appearance: Normal appearance. He is obese. He is not toxic-appearing or diaphoretic.      Comments: Morbidly obese. Body mass index is 58.9 kg/m².   HENT:      Head: Normocephalic and atraumatic.      Right Ear: External ear normal.      Left Ear: External ear normal.      Nose: Nose normal.      Mouth/Throat:      Mouth: Mucous membranes are moist.      Pharynx: Oropharynx is clear. No oropharyngeal exudate.   Eyes:      General: No scleral icterus.     Extraocular Movements: Extraocular movements intact.      Conjunctiva/sclera: Conjunctivae normal.      Pupils: Pupils are equal, round, and reactive to light.   Neck:      Musculoskeletal: Normal range of motion and neck supple.   Cardiovascular:      Rate and Rhythm: Normal rate. Rhythm irregular.      Pulses: Normal pulses.      Heart sounds: Normal heart sounds. No murmur. No friction rub. No gallop.    Pulmonary:      Effort: Pulmonary effort is normal. No respiratory distress.      Breath sounds: No stridor. No wheezing, rhonchi or rales.      Comments: Diminished air entry B/L bases  Chest:      Chest wall: No tenderness.   Abdominal:      General: Abdomen is flat. Bowel sounds are normal. There is no distension.      Palpations: Abdomen is soft. There is no mass.      Tenderness: There is no abdominal tenderness. There is no guarding or rebound.   Musculoskeletal: Normal range of motion.         General: No swelling.      Right lower leg: Edema present.      Left lower leg: Edema present.      Comments: 1+ B/L LE edema   Lymphadenopathy:      Cervical: No cervical adenopathy.   Skin:     General: Skin is warm and dry.      Capillary  Refill: Capillary refill takes less than 2 seconds.      Coloration: Skin is not jaundiced.      Findings: No rash.   Neurological:      General: No focal deficit present.      Mental Status: He is alert and oriented to person, place, and time. Mental status is at baseline.      Cranial Nerves: No cranial nerve deficit.      Motor: No weakness.   Psychiatric:         Mood and Affect: Mood normal.         Behavior: Behavior normal.         Thought Content: Thought content normal.         Judgment: Judgment normal.             Fluids    Intake/Output Summary (Last 24 hours) at 9/8/2020 1112  Last data filed at 9/8/2020 1005  Gross per 24 hour   Intake 1670 ml   Output 100 ml   Net 1570 ml       Laboratory  Recent Labs     09/06/20  0534 09/07/20  0249 09/08/20  0210   WBC 7.6 7.2 7.3   RBC 4.57* 4.58* 4.81   HEMOGLOBIN 12.2* 11.9* 12.6*   HEMATOCRIT 39.9* 40.9* 42.6   MCV 87.3 89.3 88.6   MCH 26.7* 26.0* 26.2*   MCHC 30.6* 29.1* 29.6*   RDW 50.4* 51.8* 51.8*   PLATELETCT 175 161* 162*   MPV 11.1 11.1 11.3     Recent Labs     09/06/20 2024 09/07/20  0249 09/08/20  0210   SODIUM 136 137 134*   POTASSIUM 4.6 4.8 4.5   CHLORIDE 100 99 98   CO2 19* 24 25   GLUCOSE 175* 162* 140*   BUN 28* 27* 32*   CREATININE 1.17 1.21 1.26   CALCIUM 9.4 9.1 9.1                   Imaging  EC-ECHOCARDIOGRAM COMPLETE W/ CONT   Final Result      DX-CHEST-PORTABLE (1 VIEW)   Final Result      Mild-moderate pulmonary edema with associated enlargement of the cardiac silhouette           Assessment/Plan  * Acute on chronic diastolic heart failure (HCC)- (present on admission)  Assessment & Plan  -Improving volume status.  Echocardiogram showed EF of 30% (down from 40% in July 2018).  -Continue IV Lasix 20 mg twice daily.  Start Aldactone 12.5 mg daily, increased to 25 mg if renal function tolerates.  Close intake and output monitoring, and daily weights.  Continue Lopressor, and lisinopril.  -Continue to monitor on telemetry.  -Repeat BNP, and  chest x-ray in the morning.    Paroxysmal A-fib with RVR (HCC)- (present on admission)  Assessment & Plan  - A. fib with RVR due to noncompliant with medications.  Maintaining good control.  Continue metoprolol.  -As per most recent discharge summary, patient is not on any anticoagulation due to risk of falls and bleeding.  -Continue to monitor on telemetry.    Class 2 obesity due to excess calories without serious comorbidity with body mass index (BMI) of 37.0 to 37.9 in adult- (present on admission)  Assessment & Plan  - Body mass index is 58.9 kg/m²..  - Counseled on weight loss.  - outpatient referral for outpatient weight management.    Essential hypertension- (present on admission)  Assessment & Plan  -Maintaining good control.  Continue lisinopril and metoprolol.  He is also on Lasix, and added Aldactone today which should help with blood pressure control.  Monitor blood pressure trend closely.    GERD (gastroesophageal reflux disease)- (present on admission)  Assessment & Plan  -Continue as needed MOM.    Type 2 diabetes mellitus, without long-term current use of insulin, with peripheral neuropathy (HCC)- (present on admission)  Assessment & Plan  -Continue sliding scale insulin coverage while in-house. Accu-Cheks before meals and at bedtime. Goal to keep BG between 140-180 per 2019 ADA guidelines.           VTE prophylaxis: Lovenox SQ

## 2020-09-08 NOTE — PROGRESS NOTES
Med Rec completed per [facility] pharmacy  Allergies reviewed  No ORAL antibiotics in last 14 days    There were 2 medications filled back in February for a month supply, Metoprolol ER 50 mg once daily and Lisinopril 40 mg once daily    Informed formerly [facility] of changes made to med rec

## 2020-09-08 NOTE — DISCHARGE PLANNING
"Hospital Care Management Discharge Planning Note       Anticipated Discharge Disposition:  · SNF     Action:  · This RN CM spoke with with patient at bedside to obtain SNF choice.   · Per choice form, pt's preference is as follows:  · (1) [facility].  · This RN CM faxed choice form to [facility].  · Fax confirmation received at [phone].     Barriers to Discharge:  · SNF acceptance and bed availability.     Plan:  · Patient to be discharged to SNF once accepted and bed is available.  · Follow up with [facility] regarding SNF acceptance as needed.   · Hospital Care Management Team to continue to provide support services and assistance with discharge planning as needed.       Current Expected Discharge Date: 9/9/2020      For further assistance please contact the assigned RN Case Manager or  at the extension listed under \"Treatment Teams\".        "

## 2020-09-08 NOTE — DISCHARGE PLANNING
Received Choice form at [phone]  Agency/Facility Name: [facility], [facility], [place], [facility], [facility], [place], [place], [facility]  Referral sent per Choice form @ [phone]

## 2020-09-08 NOTE — THERAPY
"Occupational Therapy   Initial Evaluation     Patient Name: Sebastián Castro  Age:  55 y.o., Sex:  male  Medical Record #: [id number]  Today's Date: 9/8/2020     Precautions  Precautions: Fall Risk    Assessment  Patient is 55 y.o. male with a diagnosis of CHF. PMH significant for obesity, a-fib, heart failure, DM 2, HTN, GERD.  Pt demo decreased activity tolerance and balance limiting pt's ability to safely/indep complete ADLs/IADLs/functional transfers and mobility. Pt would benefit from skilled OT services in the acute care setting to address these deficits.    Plan    Recommend Occupational Therapy 3 times per week until therapy goals are met for the following treatments:  Adaptive Equipment, Community Re-integration, Neuro Re-Education / Balance, Self Care/Activities of Daily Living, Therapeutic Activities and Therapeutic Exercises.    DC Equipment Recommendations: Unable to determine at this time  Discharge Recommendations: Recommend post-acute placement for additional occupational therapy services prior to discharge home.     Subjective    \"I won't have anyone to help at the group home\"     Objective     09/08/20 1027   Prior Living Situation   Prior Services None   Housing / Facility Group Home   Steps In Home   (1 FOS to bedroom)   Bathroom Set up Bathtub / Shower Combination   Equipment Owned None   Lives with - Patient's Self Care Capacity Other (Comments)  (group home, pt reports no caregiver assist for ADLs/IADLs)   Prior Level of ADL Function   Self Feeding Independent   Grooming / Hygiene Independent   Bathing Independent   Dressing Independent   Toileting Independent   Prior Level of IADL Function   Medication Management Independent  (pt reports meds delivered)   Laundry Independent   Kitchen Mobility Independent   Finances Independent   Home Management Independent   Shopping Independent   Prior Level Of Mobility Independent Without Device in Community;Independent Without Device in Home   Driving / " Transportation Utilizes Public Transportation   Comments per pt report   Balance Assessment   Sitting Balance (Static) Fair   Sitting Balance (Dynamic) Fair -   Standing Balance (Static) Fair -   Standing Balance (Dynamic) Fair -   Weight Shift Sitting Fair   Weight Shift Standing Fair   Bed Mobility    Supine to Sit Minimal Assist  (for safety, physical assist not needed)   Sit to Supine Minimal Assist  (for safety, physical assist not needed)   Scooting Supervised   ADL Assessment   Grooming Minimal Assist;Standing  (oral cares, min A for safety physical assist not needed)   Upper Body Dressing Minimal Assist  (to doff soiled hospital gown/don clean gown)   Lower Body Dressing Maximal Assist  (to don B/L socks)   Toileting Moderate Assist  (mod A posterior hygiene to ensure thoroughly clean)   Functional Mobility   Sit to Stand Minimal Assist  (for safety, physical assist not needed)   Bed, Chair, Wheelchair Transfer Supervised   Toilet Transfers Supervised  (grab bar assist)   Transfer Method Stand Step  (no AD, pt furniture walking, AD to be determined by PT )   Mobility bed>bedside sink>toilet>bathroom sink>bed   Activity Tolerance   Sitting Edge of Bed 5-7 mins   Standing 8-10 mins total   Patient / Family Goals   Patient / Family Goal #1 to get better   Short Term Goals   Short Term Goal # 1 Pt will complete LB dressing at supervision with AE x 5 sessions   Short Term Goal # 2 Pt will complete toileting transfers/toileting tasks at supervision x 5 sessions   Short Term Goal # 3 Pt will complete tub transfers at supervision x 5 sessions   Anticipated Discharge Equipment and Recommendations   DC Equipment Recommendations Unable to determine at this time   Discharge Recommendations Recommend post-acute placement for additional occupational therapy services prior to discharge home

## 2020-09-09 ENCOUNTER — APPOINTMENT (OUTPATIENT)
Dept: RADIOLOGY | Facility: MEDICAL CENTER | Age: 55
DRG: 292 | End: 2020-09-09
Attending: INTERNAL MEDICINE
Payer: MEDICAID

## 2020-09-09 LAB
ANION GAP SERPL CALC-SCNC: 13 MMOL/L (ref 7–16)
BUN SERPL-MCNC: 31 MG/DL (ref 8–22)
CALCIUM SERPL-MCNC: 8.9 MG/DL (ref 8.5–10.5)
CHLORIDE SERPL-SCNC: 97 MMOL/L (ref 96–112)
CO2 SERPL-SCNC: 23 MMOL/L (ref 20–33)
CREAT SERPL-MCNC: 1.14 MG/DL (ref 0.5–1.4)
GLUCOSE BLD-MCNC: 120 MG/DL (ref 65–99)
GLUCOSE BLD-MCNC: 134 MG/DL (ref 65–99)
GLUCOSE BLD-MCNC: 150 MG/DL (ref 65–99)
GLUCOSE BLD-MCNC: 155 MG/DL (ref 65–99)
GLUCOSE SERPL-MCNC: 115 MG/DL (ref 65–99)
NT-PROBNP SERPL IA-MCNC: 4021 PG/ML (ref 0–125)
POTASSIUM SERPL-SCNC: 4 MMOL/L (ref 3.6–5.5)
SODIUM SERPL-SCNC: 133 MMOL/L (ref 135–145)

## 2020-09-09 PROCEDURE — 36415 COLL VENOUS BLD VENIPUNCTURE: CPT

## 2020-09-09 PROCEDURE — 770020 HCHG ROOM/CARE - TELE (206)

## 2020-09-09 PROCEDURE — 82962 GLUCOSE BLOOD TEST: CPT

## 2020-09-09 PROCEDURE — A9270 NON-COVERED ITEM OR SERVICE: HCPCS | Performed by: HOSPITALIST

## 2020-09-09 PROCEDURE — 80048 BASIC METABOLIC PNL TOTAL CA: CPT

## 2020-09-09 PROCEDURE — 71045 X-RAY EXAM CHEST 1 VIEW: CPT

## 2020-09-09 PROCEDURE — 83880 ASSAY OF NATRIURETIC PEPTIDE: CPT

## 2020-09-09 PROCEDURE — 99232 SBSQ HOSP IP/OBS MODERATE 35: CPT | Performed by: INTERNAL MEDICINE

## 2020-09-09 PROCEDURE — A9270 NON-COVERED ITEM OR SERVICE: HCPCS | Performed by: INTERNAL MEDICINE

## 2020-09-09 PROCEDURE — 700102 HCHG RX REV CODE 250 W/ 637 OVERRIDE(OP): Performed by: HOSPITALIST

## 2020-09-09 PROCEDURE — 700102 HCHG RX REV CODE 250 W/ 637 OVERRIDE(OP): Performed by: INTERNAL MEDICINE

## 2020-09-09 PROCEDURE — 700111 HCHG RX REV CODE 636 W/ 250 OVERRIDE (IP): Performed by: HOSPITALIST

## 2020-09-09 RX ORDER — SPIRONOLACTONE 25 MG/1
25 TABLET ORAL
Status: DISCONTINUED | OUTPATIENT
Start: 2020-09-10 | End: 2020-09-10 | Stop reason: HOSPADM

## 2020-09-09 RX ADMIN — INSULIN HUMAN 1 UNITS: 100 INJECTION, SOLUTION PARENTERAL at 20:00

## 2020-09-09 RX ADMIN — SPIRONOLACTONE 12.5 MG: 25 TABLET ORAL at 05:28

## 2020-09-09 RX ADMIN — ENOXAPARIN SODIUM 60 MG: 60 INJECTION SUBCUTANEOUS at 18:00

## 2020-09-09 RX ADMIN — ASPIRIN 81 MG: 81 TABLET, COATED ORAL at 05:26

## 2020-09-09 RX ADMIN — ACETAMINOPHEN 650 MG: 325 TABLET, FILM COATED ORAL at 12:19

## 2020-09-09 RX ADMIN — FUROSEMIDE 20 MG: 10 INJECTION, SOLUTION INTRAMUSCULAR; INTRAVENOUS at 16:00

## 2020-09-09 RX ADMIN — SERTRALINE HYDROCHLORIDE 125 MG: 100 TABLET ORAL at 05:25

## 2020-09-09 RX ADMIN — OLANZAPINE 7.5 MG: 5 TABLET, FILM COATED ORAL at 16:57

## 2020-09-09 RX ADMIN — FUROSEMIDE 20 MG: 10 INJECTION, SOLUTION INTRAMUSCULAR; INTRAVENOUS at 05:26

## 2020-09-09 RX ADMIN — METOPROLOL TARTRATE 25 MG: 25 TABLET, FILM COATED ORAL at 18:00

## 2020-09-09 RX ADMIN — ENOXAPARIN SODIUM 60 MG: 60 INJECTION SUBCUTANEOUS at 05:25

## 2020-09-09 RX ADMIN — LISINOPRIL 10 MG: 5 TABLET ORAL at 05:26

## 2020-09-09 RX ADMIN — METOPROLOL TARTRATE 25 MG: 25 TABLET, FILM COATED ORAL at 05:25

## 2020-09-09 NOTE — DISCHARGE PLANNING
Received Transport Form @ 1232  Spoke to Alfredo Bolaños     Transport is scheduled for Thursday 9/9/2020 @1000 going to [facility].  Zenia Notified.

## 2020-09-09 NOTE — DISCHARGE PLANNING
"Hospital Care Management Discharge Planning       Anticipated Discharge Disposition:   · SNF     Action:   · Patient has been accepted to [facility] and they report having a bariatric bed available for the patient today.  · Per Dr. Winston patient not medically cleared today but can be scheduled for transportation tomorrow.    · This RN CM spoke with patient at bedside and obtained consent to transfer to [facility] tomorrow.   · Patient exceeds 350 lbs weight limit for wheelchair transport. This RN CM provided completed [employer] PCS form to ALY Gutierrez for transportation arrangement.  · Addendum (3019):   · PASRR # Verified: [id number]  · LOC # Verified: [id number]     Barriers to Discharge:   · Medical Clearance.  · Transportation.      Plan:   · Patient to discharge to [facility] today once transportation is arranged.   · Hospital Care Management Team to continue to provide support services and assistance with discharge planning as needed.       Current Expected Day of Discharge: 9/10/2020       For further assistance please contact the assigned RN Case Manager or  at the extension listed under \"Treatment Teams\".      "

## 2020-09-09 NOTE — PROGRESS NOTES
Pt report received from lionel manzano RN. Pt resting in bed at this time. He is A and O x 4 with no complaints of pain. He states he as mild sob at this time but it has not worsened over the night. Pt denies n/v. Bed is locked and in low position. Call light within reach.

## 2020-09-09 NOTE — PROGRESS NOTES
Hospital Medicine Daily Progress Note    Date of Service  9/9/2020    Chief Complaint  shortness of breath, lower extremity edema    Hospital Course    Mr. Sebasitán Castro is a 55 y.o. male with a past medical history significant for obesity, atrial fibrillation, heart failure, and diabetes, who presented on 9/6/2020 with shortness of breath, orthopnea, lower extremity edema, and palpitations.  Apparently, patient ran out of his medication 3 days prior to presentation.  He denies dietary indiscretion.  On presentation was found to be in atrial fibrillation with rapid regular response.  Chest x-ray demonstrated pulmonary edema.  Troponins mildly elevated and peaked at 44.  Patient was started on IV Lasix, along with beta-blocker, and ACE inhibitor.  He was deemed to not be a candidate for anticoagulation due to risk of falls and bleeding. Echocardiogram showed EF of 30%, with mild concentric left ventricular hypertrophy, mild mitral regurgitation, and RVSP of 40 mmHg.  He diuresed well.          Interval Problem Update  9/9/2020 - I reviewed the patient's chart today. Uneventful night. VSS. Afebrile. Saturating well on RA.  Sodium 133.  Potassium remains normal.  Renal function remains normal.  BNP 4021.  Repeat chest x-ray showed interstitial prominence suggesting mild edema (personally reviewed) (personally reviewed).  He continues to diurese well.  PT/OT recommended postacute placement    > I have personally seen and examined the patient today. No further chest pains.  He is feeling better overall.  Breathing better, although still feels a little congested in the chest.  No nausea, vomiting, abdominal pain.  No further orthopnea.  Leg edema improving.          Consultants/Specialty  None    Code Status  Full Code    Disposition  Monitor on telemetry  SNF placement    Review of Systems  ROS     Pertinent positives/negatives as mentioned above.     A complete review of systems was personally done by me. All other  systems were negative.       Physical Exam  Temp:  [35.9 °C (96.7 °F)-36.4 °C (97.5 °F)] 35.9 °C (96.7 °F)  Pulse:  [] 88  Resp:  [16-19] 16  BP: (113-149)/(68-93) 145/88  SpO2:  [88 %-95 %] 88 %    Physical Exam  Vitals signs and nursing note reviewed.   Constitutional:       General: He is not in acute distress.     Appearance: Normal appearance. He is obese. He is not toxic-appearing or diaphoretic.      Comments: Morbidly obese. Body mass index is 58.9 kg/m².   HENT:      Head: Normocephalic and atraumatic.      Right Ear: External ear normal.      Left Ear: External ear normal.      Nose: Nose normal.      Mouth/Throat:      Mouth: Mucous membranes are moist.      Pharynx: Oropharynx is clear. No oropharyngeal exudate.   Eyes:      General: No scleral icterus.     Extraocular Movements: Extraocular movements intact.      Conjunctiva/sclera: Conjunctivae normal.      Pupils: Pupils are equal, round, and reactive to light.   Neck:      Musculoskeletal: Normal range of motion and neck supple.   Cardiovascular:      Rate and Rhythm: Normal rate. Rhythm irregular.      Pulses: Normal pulses.      Heart sounds: Normal heart sounds. No murmur. No friction rub. No gallop.    Pulmonary:      Effort: Pulmonary effort is normal. No respiratory distress.      Breath sounds: No stridor. No wheezing, rhonchi or rales.      Comments: Diminished air entry B/L bases  Chest:      Chest wall: No tenderness.   Abdominal:      General: Abdomen is flat. Bowel sounds are normal. There is no distension.      Palpations: Abdomen is soft. There is no mass.      Tenderness: There is no abdominal tenderness. There is no guarding or rebound.   Musculoskeletal: Normal range of motion.         General: No swelling.      Right lower leg: Edema present.      Left lower leg: Edema present.      Comments: Improving 1+ bilateral lower extremity edema.   Lymphadenopathy:      Cervical: No cervical adenopathy.   Skin:     General: Skin is  warm and dry.      Capillary Refill: Capillary refill takes less than 2 seconds.      Coloration: Skin is not jaundiced.      Findings: No rash.   Neurological:      General: No focal deficit present.      Mental Status: He is alert and oriented to person, place, and time. Mental status is at baseline.      Cranial Nerves: No cranial nerve deficit.      Motor: No weakness.   Psychiatric:         Mood and Affect: Mood normal.         Behavior: Behavior normal.         Thought Content: Thought content normal.         Judgment: Judgment normal.               Fluids    Intake/Output Summary (Last 24 hours) at 9/9/2020 1032  Last data filed at 9/9/2020 0800  Gross per 24 hour   Intake 270 ml   Output 1550 ml   Net -1280 ml       Laboratory  Recent Labs     09/07/20 0249 09/08/20  0210   WBC 7.2 7.3   RBC 4.58* 4.81   HEMOGLOBIN 11.9* 12.6*   HEMATOCRIT 40.9* 42.6   MCV 89.3 88.6   MCH 26.0* 26.2*   MCHC 29.1* 29.6*   RDW 51.8* 51.8*   PLATELETCT 161* 162*   MPV 11.1 11.3     Recent Labs     09/07/20  0249 09/08/20  0210 09/09/20  0245   SODIUM 137 134* 133*   POTASSIUM 4.8 4.5 4.0   CHLORIDE 99 98 97   CO2 24 25 23   GLUCOSE 162* 140* 115*   BUN 27* 32* 31*   CREATININE 1.21 1.26 1.14   CALCIUM 9.1 9.1 8.9                   Imaging  DX-CHEST-PORTABLE (1 VIEW)   Final Result      Interstitial prominence consistent with mild pulmonary edema.      EC-ECHOCARDIOGRAM COMPLETE W/ CONT   Final Result      DX-CHEST-PORTABLE (1 VIEW)   Final Result      Mild-moderate pulmonary edema with associated enlargement of the cardiac silhouette           Assessment/Plan  * Acute on chronic diastolic heart failure (HCC)- (present on admission)  Assessment & Plan  -Volume status continues to improve, may be near euvolemia.  Echocardiogram showed EF of 30% (down from 40% in July 2018).  -Continue IV Lasix 20 mg twice daily.  Increase Aldactone to 25 mg daily, trend renal function and potassium.  Close intake and output monitoring, and daily  weights.  Continue Lopressor, and lisinopril.  -Continue to monitor on telemetry.  -Serial BNP. Repeat chest x-ray in the morning.    Paroxysmal A-fib with RVR (HCC)- (present on admission)  Assessment & Plan  - A. fib with RVR due to noncompliant with medications.  Maintaining good control.  Continue metoprolol.  -As per most recent discharge summary, patient is not on any anticoagulation due to risk of falls and bleeding.  -Continue to monitor on telemetry.    Class 2 obesity due to excess calories without serious comorbidity with body mass index (BMI) of 37.0 to 37.9 in adult- (present on admission)  Assessment & Plan  - Body mass index is 58.9 kg/m²..  - Counseled on weight loss.  - outpatient referral for outpatient weight management.    Essential hypertension- (present on admission)  Assessment & Plan  -Maintaining fair control.  Continue lisinopril and metoprolol.  He is also on Lasix, and increased dose of Aldactone today which should help with blood pressure control.  Monitor blood pressure trend closely.    GERD (gastroesophageal reflux disease)- (present on admission)  Assessment & Plan  -Continue as needed MOM.    Type 2 diabetes mellitus, without long-term current use of insulin, with peripheral neuropathy (HCC)- (present on admission)  Assessment & Plan  -Continue sliding scale insulin coverage while in-house. Accu-Cheks before meals and at bedtime. Goal to keep BG between 140-180 per 2019 ADA guidelines.           VTE prophylaxis: Lovenox SQ

## 2020-09-09 NOTE — CARE PLAN
Problem: Communication  Goal: The ability to communicate needs accurately and effectively will improve  Outcome: PROGRESSING AS EXPECTED  Intervention: [place] patient and significant other/support system to call light to alert staff of needs  Flowsheets (Taken 9/8/2020 2045)  Oriented to:: All of the Following : Location of Bathroom, Visiting Policy, Unit Routine, Call Light and Bedside Controls, Bedside Rail Policy, Smoking Policy, Rights and Responsibilities, Bedside Report, and Patient Education Notebook     Problem: Safety  Goal: Will remain free from falls  Outcome: PROGRESSING AS EXPECTED  Intervention: Implement fall precautions  Flowsheets (Taken 9/8/2020 2045)  Environmental Precautions:   Treaded Slipper Socks on Patient   Personal Belongings, Wastebasket, Call Bell etc. in Easy Reach   Transferred to Stronger Side   Report Given to Other Health Care Providers Regarding Fall Risk   Bed in Low Position   Communication Sign for Patients & Families   Mobility Assessed & Appropriate Sign Placed  Chair/Bed Strip Alarm: Yes - Alarm On

## 2020-09-09 NOTE — PROGRESS NOTES
Assumed care of pt, bedside report received from ELISA Ruiz. Call light within reach. Addressed POC with pt, no additional questions at this time.

## 2020-09-10 ENCOUNTER — APPOINTMENT (OUTPATIENT)
Dept: RADIOLOGY | Facility: MEDICAL CENTER | Age: 55
DRG: 292 | End: 2020-09-10
Attending: INTERNAL MEDICINE
Payer: MEDICAID

## 2020-09-10 ENCOUNTER — PATIENT OUTREACH (OUTPATIENT)
Dept: HEALTH INFORMATION MANAGEMENT | Facility: OTHER | Age: 55
End: 2020-09-10

## 2020-09-10 VITALS
SYSTOLIC BLOOD PRESSURE: 146 MMHG | HEART RATE: 87 BPM | RESPIRATION RATE: 16 BRPM | OXYGEN SATURATION: 93 % | BODY MASS INDEX: 42.66 KG/M2 | WEIGHT: 315 LBS | HEIGHT: 72 IN | DIASTOLIC BLOOD PRESSURE: 96 MMHG | TEMPERATURE: 97.5 F

## 2020-09-10 LAB
ANION GAP SERPL CALC-SCNC: 14 MMOL/L (ref 7–16)
BUN SERPL-MCNC: 32 MG/DL (ref 8–22)
CALCIUM SERPL-MCNC: 9.3 MG/DL (ref 8.5–10.5)
CHLORIDE SERPL-SCNC: 96 MMOL/L (ref 96–112)
CO2 SERPL-SCNC: 26 MMOL/L (ref 20–33)
CREAT SERPL-MCNC: 1.07 MG/DL (ref 0.5–1.4)
GLUCOSE BLD-MCNC: 141 MG/DL (ref 65–99)
GLUCOSE SERPL-MCNC: 131 MG/DL (ref 65–99)
NT-PROBNP SERPL IA-MCNC: 3578 PG/ML (ref 0–125)
POTASSIUM SERPL-SCNC: 4.3 MMOL/L (ref 3.6–5.5)
SODIUM SERPL-SCNC: 136 MMOL/L (ref 135–145)

## 2020-09-10 PROCEDURE — 82962 GLUCOSE BLOOD TEST: CPT

## 2020-09-10 PROCEDURE — 700102 HCHG RX REV CODE 250 W/ 637 OVERRIDE(OP): Performed by: INTERNAL MEDICINE

## 2020-09-10 PROCEDURE — 700102 HCHG RX REV CODE 250 W/ 637 OVERRIDE(OP): Performed by: HOSPITALIST

## 2020-09-10 PROCEDURE — 83880 ASSAY OF NATRIURETIC PEPTIDE: CPT

## 2020-09-10 PROCEDURE — 80048 BASIC METABOLIC PNL TOTAL CA: CPT

## 2020-09-10 PROCEDURE — 99239 HOSP IP/OBS DSCHRG MGMT >30: CPT | Performed by: INTERNAL MEDICINE

## 2020-09-10 PROCEDURE — 71045 X-RAY EXAM CHEST 1 VIEW: CPT

## 2020-09-10 PROCEDURE — A9270 NON-COVERED ITEM OR SERVICE: HCPCS | Performed by: INTERNAL MEDICINE

## 2020-09-10 PROCEDURE — A9270 NON-COVERED ITEM OR SERVICE: HCPCS | Performed by: HOSPITALIST

## 2020-09-10 PROCEDURE — 36415 COLL VENOUS BLD VENIPUNCTURE: CPT

## 2020-09-10 PROCEDURE — 700111 HCHG RX REV CODE 636 W/ 250 OVERRIDE (IP): Performed by: HOSPITALIST

## 2020-09-10 RX ORDER — LISINOPRIL 10 MG/1
10 TABLET ORAL DAILY
Qty: 30 TAB | Status: ON HOLD
Start: 2020-09-11 | End: 2020-12-17

## 2020-09-10 RX ORDER — FUROSEMIDE 40 MG/1
40 TABLET ORAL DAILY
Qty: 30 TAB | Status: ON HOLD
Start: 2020-09-10 | End: 2020-12-12

## 2020-09-10 RX ORDER — SPIRONOLACTONE 25 MG/1
25 TABLET ORAL DAILY
Qty: 30 TAB | Refills: 3 | Status: ON HOLD
Start: 2020-09-11 | End: 2020-12-17

## 2020-09-10 RX ORDER — METOPROLOL SUCCINATE 25 MG/1
25 TABLET, EXTENDED RELEASE ORAL DAILY
Qty: 30 TAB | Status: SHIPPED
Start: 2020-09-10 | End: 2020-09-10 | Stop reason: SDUPTHER

## 2020-09-10 RX ORDER — ASPIRIN 81 MG/1
81 TABLET ORAL DAILY
Qty: 30 TAB | Status: ON HOLD
Start: 2020-09-11 | End: 2020-12-12

## 2020-09-10 RX ORDER — METOPROLOL SUCCINATE 25 MG/1
50 TABLET, EXTENDED RELEASE ORAL DAILY
Qty: 30 TAB | Status: ON HOLD
Start: 2020-09-10 | End: 2020-12-17

## 2020-09-10 RX ADMIN — ENOXAPARIN SODIUM 60 MG: 60 INJECTION SUBCUTANEOUS at 05:12

## 2020-09-10 RX ADMIN — METOPROLOL TARTRATE 25 MG: 25 TABLET, FILM COATED ORAL at 05:12

## 2020-09-10 RX ADMIN — SERTRALINE HYDROCHLORIDE 125 MG: 100 TABLET ORAL at 05:12

## 2020-09-10 RX ADMIN — FUROSEMIDE 20 MG: 10 INJECTION, SOLUTION INTRAMUSCULAR; INTRAVENOUS at 05:13

## 2020-09-10 RX ADMIN — SPIRONOLACTONE 25 MG: 25 TABLET ORAL at 05:12

## 2020-09-10 RX ADMIN — ASPIRIN 81 MG: 81 TABLET, COATED ORAL at 05:12

## 2020-09-10 RX ADMIN — LISINOPRIL 10 MG: 5 TABLET ORAL at 05:12

## 2020-09-10 ASSESSMENT — FIBROSIS 4 INDEX: FIB4 SCORE: 1.62

## 2020-09-10 NOTE — PROGRESS NOTES
Pt dc'd to heartstone. IV and monitor removed; monitor room notified. Pt left unit via stretcher with [facility] medical transport. Personal belongings with pt when leaving unit. Pt given discharge instructions prior to leaving unit including prescription and when to visit with physician; verbalizes understanding. Copy of discharge instructions with pt and in the chart.     D/C packet given to [facility] transport team, copy of d/c instructions placed in packet. Report given to transport team. No additional requests at this time.

## 2020-09-10 NOTE — DISCHARGE INSTRUCTIONS
Discharge Instructions    Discharged to other by medical transportation with escort. Discharged via ambulance, hospital escort: Yes.  Special equipment needed: Not Applicable    Be sure to schedule a follow-up appointment with your primary care doctor or any specialists as instructed.     Discharge Plan:        I understand that a diet low in cholesterol, fat, and sodium is recommended for good health. Unless I have been given specific instructions below for another diet, I accept this instruction as my diet prescription.   Other diet: Cardiac    Special Instructions:   HF Patient Discharge Instructions  · Monitor your weight daily, and maintain a weight chart, to track your weight changes.   · Activity as tolerated, unless your Doctor has ordered otherwise. Other activity order: as tolerated.  · Follow a low fat, low cholesterol, low salt diet unless instructed otherwise by your Doctor. Read the labels on the back of food products and track your intake of fat, cholesterol and salt.   · Fluid Restriction No. If a Fluid Restriction has been ordered by your Doctor, measure fluids with a measuring cup to ensure that you are not exceeding the restriction.   · No smoking.  · Oxygen No. If your Doctor has ordered that you wear Oxygen at home, it is important to wear it as ordered.  · Did you receive an explanation from staff on the importance of taking each of your medications and why it is necessary to keep taking them unless your doctor says to stop? Yes  · Were all of your questions answered about how to manage your heart failure and what to do if you have increased signs and symptoms after you go home? Yes  · Do you feel like your heart failure care team involved you in the care treatment plan and allowed you to make decisions regarding your care while in the hospital and addressed any discharge needs you might have? Yes    See the educational handout provided at discharge for more information on monitoring your daily  weight, activity and diet. This also explains more about Heart Failure, symptoms of a flare-up and some of the tests that you have undergone.     Warning Signs of a Flare-Up include:  · Swelling in the ankles or lower legs.  · Shortness of breath, while at rest, or while doing normal activities.   · Shortness of breath at night when in bed, or coughing in bed.   · Requiring more pillows to sleep at night, or needing to sit up at night to sleep.  · Feeling weak, dizzy or fatigued.     When to call your Doctor:  · Call [facility] Hotline seven days a week from 8:00 a.m. to 8:00 p.m. for medical questions [phone]) [phone].  · Call your Primary Care Physician or Cardiologist if:   1. You experience any pain radiating to your jaw or neck.  2. You have any difficulty breathing.  3. You experience weight gain of 3 lbs in a day or 5 lbs in a week.   4. You feel any palpitations or irregular heartbeats.  5. You become dizzy or lose consciousness.   If you have had an angiogram or had a pacemaker or AICD placed, and experience:  1. Bleeding, drainage or swelling at the surgical / puncture site.  2. Fever greater than 100.0 F  3. Shock from internal defibrillator.  4. Cool and / or numb extremities.      · Is patient discharged on Warfarin / Coumadin?   No     Depression / Suicide Risk    As you are discharged from this [facility], it is important to learn how to keep safe from harming yourself.    Recognize the warning signs:  · Abrupt changes in personality, positive or negative- including increase in energy   · Giving away possessions  · Change in eating patterns- significant weight changes-  positive or negative  · Change in sleeping patterns- unable to sleep or sleeping all the time   · Unwillingness or inability to communicate  · Depression  · Unusual sadness, discouragement and loneliness  · Talk of wanting to die  · Neglect of personal appearance   · Rebelliousness- reckless behavior  · Withdrawal from  people/activities they love  · Confusion- inability to concentrate     If you or a loved one observes any of these behaviors or has concerns about self-harm, here's what you can do:  · Talk about it- your feelings and reasons for harming yourself  · Remove any means that you might use to hurt yourself (examples: pills, rope, extension cords, firearm)  · Get professional help from the community (Mental Health, Substance Abuse, psychological counseling)  · Do not be alone:Call your Safe Contact- someone whom you trust who will be there for you.  · Call your local CRISIS HOTLINE 369-3729 or 321-224-0323  · Call your local Children's Mobile Crisis Response Team [place] (079) 783-2476 or www.[facility]  · Call the toll free National Suicide Prevention Hotlines   · National Suicide Prevention Lifeline 754-233-HKAU (3534)  · National Hope Line Network 800-SUICIDE (479-6480)

## 2020-09-10 NOTE — PROGRESS NOTES
Report called and given to Melisa Calle RN at [facility]. All questions answered.

## 2020-09-10 NOTE — DISCHARGE SUMMARY
Discharge Summary    CHIEF COMPLAINT ON ADMISSION  Chief Complaint   Patient presents with   • Chest Pain     pt off meds for around 3 days, pt has hx of a-fib, felt palpitations as well as chest pain and SOB. pt states he also noticed increased swelling in legs   • Shortness of Breath       Reason for Admission  EMS     Admission Date  9/6/2020    CODE STATUS  Full Code    HPI & HOSPITAL COURSE  This is a Mr. Sebastián Castro is a 55 y.o. male with a past medical history significant for obesity, atrial fibrillation, heart failure, and diabetes, who presented on 9/6/2020 with shortness of breath, orthopnea, lower extremity edema, and palpitations.  Apparently, patient ran out of his medication 3 days prior to presentation.  He denies dietary indiscretion.  On presentation was found to be in atrial fibrillation with rapid regular response.  Chest x-ray demonstrated pulmonary edema.  Troponins mildly elevated and peaked at 44.  Patient was started on IV Lasix, along with beta-blocker, and ACE inhibitor, and Aldactone.  He was deemed to not be a candidate for anticoagulation due to risk of falls and bleeding. Echocardiogram showed EF of 30%, with mild concentric left ventricular hypertrophy, mild mitral regurgitation, and RVSP of 40 mmHg.  He diuresed well, to the point of euvolemia with his BUN starting to go up. PT/OT recommended postacute placement which was pursued.    I have personally seen and examined the patient on the day of discharge. With his clinical improvement, he was deemed ready to discharge from the hospital as he did not have any further hospitalization needs. Patient felt comfortable going home. The discharge plan was discussed with the patient, with which he was agreeable to.     Therefore, he is discharged in good and stable condition to skilled nursing facility.    The patient met 2-midnight criteria for an inpatient stay at the time of discharge.    Discharge Date  9/10/2020      FOLLOW UP ITEMS  POST DISCHARGE  * Acute on chronic diastolic heart failure (HCC)- (present on admission)  Assessment & Plan  -We will continue him on Lasix 40 mg daily, along with Aldactone 25 mg daily.  Continue Toprol-XL, and lisinopril.      Paroxysmal A-fib with RVR (HCC)- (present on admission)  Assessment & Plan  - A. fib with RVR due to noncompliant with medications.  Maintaining good control.  Continue metoprolol.  -As per most recent discharge summary, patient is not on any anticoagulation due to risk of falls and bleeding.  -Continue to monitor.    Essential hypertension- (present on admission)  Assessment & Plan  -Maintaining fair control.  Continue lisinopril and metoprolol.  He is also on Lasix, and Aldactone today which should help with blood pressure control.  Monitor blood pressure trend closely.        DISCHARGE DIAGNOSES  Principal Problem:    Acute on chronic diastolic heart failure (HCC) POA: Yes  Active Problems:    Paroxysmal A-fib with RVR (HCC) POA: Yes    Type 2 diabetes mellitus, without long-term current use of insulin, with peripheral neuropathy ([facility]) POA: Yes    GERD (gastroesophageal reflux disease) (Chronic) POA: Yes    Essential hypertension POA: Yes    Class 2 obesity due to excess calories without serious comorbidity with body mass index (BMI) of 37.0 to 37.9 in adult POA: Yes  Resolved Problems:    * No resolved hospital problems. *      FOLLOW UP  No future appointments.  No follow-up provider specified.    MEDICATIONS ON DISCHARGE     Medication List      START taking these medications      Instructions   aspirin 81 MG EC tablet  Start taking on: September 11, 2020   Take 1 Tab by mouth every day.  Dose: 81 mg     furosemide 40 MG Tabs  Commonly known as: LASIX   Take 1 Tab by mouth every day.  Dose: 40 mg     lisinopril 10 MG Tabs  Start taking on: September 11, 2020  Commonly known as: PRINIVIL   Take 1 Tab by mouth every day.  Dose: 10 mg     metoprolol SR 25 MG Tb24  Commonly known as:  TOPROL XL   Take 2 Tabs by mouth every day.  Dose: 50 mg     spironolactone 25 MG Tabs  Start taking on: September 11, 2020  Commonly known as: ALDACTONE   Take 1 Tab by mouth every day.  Dose: 25 mg        CONTINUE taking these medications      Instructions   hydrOXYzine HCl 50 MG Tabs  Commonly known as: ATARAX   Take 50 mg by mouth every 6 hours as needed.  Dose: 50 mg     olanzapine 7.5 MG tablet  Commonly known as: ZYPREXA   Take 7.5 mg by mouth every evening.  Dose: 7.5 mg     sertraline 100 MG Tabs  Commonly known as: ZOLOFT   Take 125 mg by mouth every day. Has both 25 and 100 mg filled  Dose: 125 mg            Allergies  Allergies   Allergen Reactions   • Daptomycin Rash     Body rash  RXN=1/2018     • Pcn [Penicillins] Hives and Rash      Rash & hives  RXN=since a kid   • Unasyn [Ampicillin-Sulbactam Sodium] Hives, Itching and Swelling   • Vancomycin Rash     Red man syndrome       DIET  Orders Placed This Encounter   Procedures   • Diet Order 2 Gram Sodium     Standing Status:   Standing     Number of Occurrences:   1     Order Specific Question:   Diet:     Answer:   2 Gram Sodium [7]       ACTIVITY  As tolerated and directed by skilled nursing.  Weight bearing as tolerated    CONSULTATIONS  None    PROCEDURES  As above    LABORATORY  Lab Results   Component Value Date    SODIUM 136 09/10/2020    POTASSIUM 4.3 09/10/2020    CHLORIDE 96 09/10/2020    CO2 26 09/10/2020    GLUCOSE 131 (H) 09/10/2020    BUN 32 (H) 09/10/2020    CREATININE 1.07 09/10/2020        Lab Results   Component Value Date    WBC 7.3 09/08/2020    HEMOGLOBIN 12.6 (L) 09/08/2020    HEMATOCRIT 42.6 09/08/2020    PLATELETCT 162 (L) 09/08/2020        Total time of the discharge process = 40 minutes.

## 2020-09-10 NOTE — HEART FAILURE PROGRAM
I am familiar with Sebastián. I've provided bedside education and full consults in the past.     Notes indicate that Sebastián was admitted for HF exacerbation after having been out of his HF meds for three days. None of the discharge plan notes address why he was out of his meds and what the plan will be when this inevitably happens again.    It also looks like Sebastián's EF has dropped to 30%. I have asked the schedulers to arrange a f/u for after the SNF discharge.    Sebastián is to discharge tomorrow. I've asked SW/BRANDON to look in to this further.    Please see below for measures that must be addressed prior to discharge.     Daily Nurse: please begin to fill out the HF checklist (pink sheet in hard chart) and use it to guide your daily care.    Discharge Nurse: please ensure completeness of the HF checklist (pink sheet in hard chart) and have it co-signed by the charge RN before the patient leaves the hospital.    Thank you, Glenna, Cardiovascular Nurse Navigator, RN, [facility] [phone]    HF Measures:  1. Documentation of LV systolic function (echo or cath) PTA, during this hospitalization, or plan to assess post discharge or reason for not assessing documented  2. Documentation of fluid intake and urine output every nursing shift  3. 2 hour post diuretic assessment documented 2 hours after diuretic given  4. HF Patient Education using the Living Well With Heart Failure Booklet and Symptom Tracker documented every nursing shift  5. Nutrition consult for diet education  6. Daily weights (one weight documented every 24 hours) on a standing scale unless standing is contraindicated in which case bed scale can be used - have patient write weight on symptom tracker  7. For LVEF less than or equal to 40%, ACE-I, ARNI or ARB prescribed at discharge   8. For LVEF less than or equal to 40%, an Evidence Based Beta Blocker (bisoprolol, carvedilol, toprol xl) must be prescribed at discharge  9. For LVEF less than or equal to 35% aldosterone blockade  prescribed at discharge  10. The combination of hydralazine and isosorbide dinitrate is recommended to reduce morbidity and mortality for patients self-described  Americans with NYHA class III-IV HFrEF (EF 40% or less), receiving optimal therapy with ACE inhibitors and beta blockers, unless contraindicated (Class I, ERIK: A).  11. If a HF patient is diabetic or is newly diagnosed with DM: prescribed diabetes treatment at discharge in the form of glycemic control (diet or anti-hyperglycemic medication) or f/u appointment for diabetes management scheduled at discharge.  12. If a HF patient has diabetes: prescribed lipid lowering medication at discharge  13. Documented smoking cessation advice or counseling  14. If a HF patient has a-fib: anticoagulation is prescribed upon discharge or contraindication is documented  15. Screening for and administering immunizations as long as no contraindications: Pneumonia (regardless of age) and Influenza  16. Written discharge instructions include:  ? Daily weights  ? Record weight on tracker  ? Bring tracker to appointments  ? Call MD for weight gain of 3lb /day or 5lb/week  ? HF medication teaching  ? Low sodium diet  ? Follow up appointment within seven calendar days of d/c must include: date, time and location  ? Activity  ? Worsening symptoms    What if any of the above HF measures are contraindicated?  ? Request that the discharging provider document the medication/intervention and the contraindication specifically in a progress note  ? For example: “no CHF meds due to hypotension” is not enough. It needs to say: “No ACE-I, ARNI, ARB due to hypotension”; “No Beta Blockade due to bradycardia”…

## 2020-09-10 NOTE — CARE PLAN
Problem: Communication  Goal: The ability to communicate needs accurately and effectively will improve  Outcome: PROGRESSING AS EXPECTED  Intervention: [place] patient and significant other/support system to call light to alert staff of needs  Flowsheets (Taken 9/9/2020 2012)  Oriented to:: All of the Following : Location of Bathroom, Visiting Policy, Unit Routine, Call Light and Bedside Controls, Bedside Rail Policy, Smoking Policy, Rights and Responsibilities, Bedside Report, and Patient Education Notebook     Problem: Safety  Goal: Will remain free from falls  Outcome: PROGRESSING AS EXPECTED  Intervention: Implement fall precautions  Flowsheets (Taken 9/9/2020 2012)  Environmental Precautions:   Treaded Slipper Socks on Patient   Personal Belongings, Wastebasket, Call Bell etc. in Easy Reach   Transferred to Stronger Side   Report Given to Other Health Care Providers Regarding Fall Risk   Bed in Low Position   Communication Sign for Patients & Families   Mobility Assessed & Appropriate Sign Placed

## 2020-09-10 NOTE — PROGRESS NOTES
Bedside report received from previous nurse regarding prior 12 hours.  POC reviewed with pt.  White board updated.  Pt verbalizes understanding.  Call light within reach.  Pt tolerated morning meds. All needs currently within reach. Cardiac monitor connected. Bed locked and lowered. No additional requests at this time.

## 2020-09-10 NOTE — DISCHARGE PLANNING
Anticipated Discharge Disposition: [facility]- [facility]    Action: LSW's first day with pt on LSW's assignment. Cassy ALAMO left handoff for pt that [facility] transport scheduled for 1000 to [facility] for today.   LSW called bedside RNLuda to ensure that she was aware of transport.     Barriers to Discharge: None     Plan: LSW to collect signature from Dr. Butterfield and pt for COBRA, LSW to complete transfer packet, LSW to assist as needed         Addendum 0832  Dr. Butterfield able to provide signature for COBRA. LSW met with pt at bedside for signature for COBRA. LSW informed that pt not on controlled substances so no hard scripts required.   LSW placed transfer packet in pt's chart.   LSW called pt's roommate, Pretty ([phone]) as requested. LSW left a vm with name and number for call back.

## 2020-09-10 NOTE — CARE PLAN
Problem: Communication  Goal: The ability to communicate needs accurately and effectively will improve  Outcome: MET     Problem: Safety  Goal: Will remain free from injury  Outcome: MET  Goal: Will remain free from falls  Outcome: MET     Problem: Infection  Goal: Will remain free from infection  Outcome: MET     Problem: Venous Thromboembolism (VTW)/Deep Vein Thrombosis (DVT) Prevention:  Goal: Patient will participate in Venous Thrombosis (VTE)/Deep Vein Thrombosis (DVT)Prevention Measures  Outcome: MET     Problem: Bowel/Gastric:  Goal: Normal bowel function is maintained or improved  Outcome: MET  Goal: Will not experience complications related to bowel motility  Outcome: MET     Problem: Knowledge Deficit  Goal: Knowledge of disease process/condition, treatment plan, diagnostic tests, and medications will improve  Outcome: MET  Goal: Knowledge of the prescribed therapeutic regimen will improve  Outcome: MET     Problem: Discharge Barriers/Planning  Goal: Patient's continuum of care needs will be met  Outcome: MET     Problem: Pain Management  Goal: Pain level will decrease to patient's comfort goal  Outcome: MET     Problem: Fluid Volume:  Goal: Will maintain balanced intake and output  Outcome: MET     Problem: Respiratory:  Goal: Respiratory status will improve  Outcome: MET     Problem: Urinary Elimination:  Goal: Ability to reestablish a normal urinary elimination pattern will improve  Outcome: MET     Problem: Skin Integrity  Goal: Risk for impaired skin integrity will decrease  Outcome: MET

## 2020-10-12 NOTE — PROGRESS NOTES
ANP Visit Note    Patient Name: Chad Castellanos   YOB: 1966   Medical Record Number: [id number]   CSN: [id number]   Date of visit: 10/12/2020   Alcon Viveros MD   No primary care provider on file.      Chief Complaint/Reason for Visit:  Triple Monitor Summary  SB/SR 53-79  1st Degree  .28/.10/.42     mellitus without retinopathy ([facility] [address] 12/23/2015       Surgical History:  Past Surgical History:   Procedure Laterality Date   • HYSTERECTOMY  2003    1 ovary removed   • NEEDLE BIOPSY LEFT  06/30/2020    US bx       Allergies:  No Known Allergies    Family H together: Not on file        Attends [employer] orthodox service: Not on file        Active member of club or organization: Not on file        Attends meetings of clubs or organizations: Not on file        Relationship status: Not on file      Intimate partner viole HYDROcodone-acetaminophen 5-325 MG Oral Tab, Take 1-2 tablets by mouth every 6 (six) hours as needed for Pain., Disp: 60 tablet, Rfl: 0  •  LOSARTAN 100 MG Oral Tab, TAKE 1 TABLET(100 MG) BY MOUTH DAILY, Disp: 90 tablet, Rfl: 3  •  AMLODIPINE BESYLATE 5 MG aspirin 81 MG Oral Chew Tab, Chew  by mouth., Disp: , Rfl:     Narcotic prescription reviewed in [place]     Review of Systems:  A comprehensive 14 point review of systems was completed. Pertinent positives and negatives noted in the the HPI.     Performance Collection Time: 10/12/20 10:45 AM   Result Value Ref Range    WBC 6.7 4.0 - 11.0 x10(3) uL    RBC 3.60 (L) 3.80 - 5.30 x10(6)uL    HGB 10.9 (L) 12.0 - 16.0 g/dL    HCT 32.9 (L) 35.0 - 48.0 %    .0 150.0 - 450.0 10(3)uL    MCV 91.4 80.0 - 100.0 fL

## 2020-11-03 ENCOUNTER — HOSPITAL ENCOUNTER (OUTPATIENT)
Dept: RADIOLOGY | Facility: MEDICAL CENTER | Age: 55
End: 2020-11-03
Payer: MEDICAID

## 2020-11-03 ENCOUNTER — APPOINTMENT (OUTPATIENT)
Dept: RADIOLOGY | Facility: MEDICAL CENTER | Age: 55
DRG: 329 | End: 2020-11-03
Attending: EMERGENCY MEDICINE
Payer: MEDICAID

## 2020-11-03 ENCOUNTER — HOSPITAL ENCOUNTER (INPATIENT)
Facility: MEDICAL CENTER | Age: 55
LOS: 44 days | DRG: 329 | End: 2020-12-17
Attending: EMERGENCY MEDICINE | Admitting: FAMILY MEDICINE
Payer: MEDICAID

## 2020-11-03 DIAGNOSIS — R10.12 LEFT UPPER QUADRANT ABDOMINAL PAIN: ICD-10-CM

## 2020-11-03 DIAGNOSIS — I96 GANGRENE OF TOE OF RIGHT FOOT (HCC): ICD-10-CM

## 2020-11-03 DIAGNOSIS — I50.22 CHRONIC SYSTOLIC HEART FAILURE (HCC): ICD-10-CM

## 2020-11-03 DIAGNOSIS — F32.A ANXIETY AND DEPRESSION: ICD-10-CM

## 2020-11-03 DIAGNOSIS — I48.0 PAROXYSMAL A-FIB (HCC): ICD-10-CM

## 2020-11-03 DIAGNOSIS — R65.20 SEPSIS WITH ACUTE ORGAN DYSFUNCTION WITHOUT SEPTIC SHOCK, DUE TO UNSPECIFIED ORGANISM, UNSPECIFIED TYPE: ICD-10-CM

## 2020-11-03 DIAGNOSIS — S98.119A AMPUTATED GREAT TOE, UNSPECIFIED LATERALITY (HCC): ICD-10-CM

## 2020-11-03 DIAGNOSIS — F41.9 ANXIETY AND DEPRESSION: ICD-10-CM

## 2020-11-03 DIAGNOSIS — D72.829 LEUKOCYTOSIS, UNSPECIFIED TYPE: ICD-10-CM

## 2020-11-03 DIAGNOSIS — A41.9 SEPSIS WITH ACUTE ORGAN DYSFUNCTION WITHOUT SEPTIC SHOCK, DUE TO UNSPECIFIED ORGANISM, UNSPECIFIED TYPE: ICD-10-CM
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
## 2020-11-03 DIAGNOSIS — K63.1: ICD-10-CM

## 2020-11-03 DIAGNOSIS — I42.8 NICM (NONISCHEMIC CARDIOMYOPATHY) (HCC): ICD-10-CM

## 2020-11-03 DIAGNOSIS — I48.91 ATRIAL FIBRILLATION WITH RAPID VENTRICULAR RESPONSE (HCC): ICD-10-CM

## 2020-11-03 DIAGNOSIS — E11.59 TYPE 2 DIABETES MELLITUS WITH OTHER CIRCULATORY COMPLICATION, WITHOUT LONG-TERM CURRENT USE OF INSULIN (HCC): ICD-10-CM

## 2020-11-03 DIAGNOSIS — E66.9 CLASS 1 OBESITY WITH BODY MASS INDEX (BMI) OF 34.0 TO 34.9 IN ADULT, UNSPECIFIED OBESITY TYPE, UNSPECIFIED WHETHER SERIOUS COMORBIDITY PRESENT: ICD-10-CM

## 2020-11-03 LAB
ALBUMIN SERPL BCP-MCNC: 2.6 G/DL (ref 3.2–4.9)
ALBUMIN/GLOB SERPL: 0.6 G/DL
ALP SERPL-CCNC: 130 U/L (ref 30–99)
ALT SERPL-CCNC: 21 U/L (ref 2–50)
ANION GAP SERPL CALC-SCNC: 8 MMOL/L (ref 7–16)
APPEARANCE UR: ABNORMAL
AST SERPL-CCNC: 36 U/L (ref 12–45)
BACTERIA #/AREA URNS HPF: NEGATIVE /HPF
BASOPHILS # BLD AUTO: 0.5 % (ref 0–1.8)
BASOPHILS # BLD: 0.05 K/UL (ref 0–0.12)
BILIRUB SERPL-MCNC: 1.1 MG/DL (ref 0.1–1.5)
BILIRUB UR QL STRIP.AUTO: ABNORMAL
BUN SERPL-MCNC: 16 MG/DL (ref 8–22)
CALCIUM SERPL-MCNC: 8.5 MG/DL (ref 8.5–10.5)
CHLORIDE SERPL-SCNC: 97 MMOL/L (ref 96–112)
CO2 SERPL-SCNC: 23 MMOL/L (ref 20–33)
COLOR UR: ABNORMAL
CREAT SERPL-MCNC: 0.68 MG/DL (ref 0.5–1.4)
EOSINOPHIL # BLD AUTO: 0.2 K/UL (ref 0–0.51)
EOSINOPHIL NFR BLD: 2 % (ref 0–6.9)
EPI CELLS #/AREA URNS HPF: ABNORMAL /HPF
ERYTHROCYTE [DISTWIDTH] IN BLOOD BY AUTOMATED COUNT: 49.2 FL (ref 35.9–50)
GLOBULIN SER CALC-MCNC: 4.6 G/DL (ref 1.9–3.5)
GLUCOSE BLD-MCNC: 156 MG/DL (ref 65–99)
GLUCOSE SERPL-MCNC: 247 MG/DL (ref 65–99)
GLUCOSE UR STRIP.AUTO-MCNC: NEGATIVE MG/DL
HCT VFR BLD AUTO: 41 % (ref 42–52)
HGB BLD-MCNC: 12.9 G/DL (ref 14–18)
HYALINE CASTS #/AREA URNS LPF: ABNORMAL /LPF
IMM GRANULOCYTES # BLD AUTO: 0.08 K/UL (ref 0–0.11)
IMM GRANULOCYTES NFR BLD AUTO: 0.8 % (ref 0–0.9)
KETONES UR STRIP.AUTO-MCNC: ABNORMAL MG/DL
LACTATE BLD-SCNC: 2 MMOL/L (ref 0.5–2)
LEUKOCYTE ESTERASE UR QL STRIP.AUTO: ABNORMAL
LIPASE SERPL-CCNC: 156 U/L (ref 11–82)
LYMPHOCYTES # BLD AUTO: 2.14 K/UL (ref 1–4.8)
LYMPHOCYTES NFR BLD: 21.9 % (ref 22–41)
MCH RBC QN AUTO: 25.1 PG (ref 27–33)
MCHC RBC AUTO-ENTMCNC: 31.5 G/DL (ref 33.7–35.3)
MCV RBC AUTO: 79.9 FL (ref 81.4–97.8)
MICRO URNS: ABNORMAL
MONOCYTES # BLD AUTO: 1.05 K/UL (ref 0–0.85)
MONOCYTES NFR BLD AUTO: 10.7 % (ref 0–13.4)
NEUTROPHILS # BLD AUTO: 6.25 K/UL (ref 1.82–7.42)
NEUTROPHILS NFR BLD: 64.1 % (ref 44–72)
NITRITE UR QL STRIP.AUTO: NEGATIVE
NRBC # BLD AUTO: 0 K/UL
NRBC BLD-RTO: 0 /100 WBC
NT-PROBNP SERPL IA-MCNC: 6197 PG/ML (ref 0–125)
PH UR STRIP.AUTO: 5 [PH] (ref 5–8)
PLATELET # BLD AUTO: 364 K/UL (ref 164–446)
PMV BLD AUTO: 9.7 FL (ref 9–12.9)
POTASSIUM SERPL-SCNC: 4.2 MMOL/L (ref 3.6–5.5)
PROT SERPL-MCNC: 7.2 G/DL (ref 6–8.2)
PROT UR QL STRIP: 30 MG/DL
RBC # BLD AUTO: 5.13 M/UL (ref 4.7–6.1)
RBC # URNS HPF: ABNORMAL /HPF
RBC UR QL AUTO: NEGATIVE
SODIUM SERPL-SCNC: 128 MMOL/L (ref 135–145)
SP GR UR STRIP.AUTO: 1.03
TROPONIN T SERPL-MCNC: 38 NG/L (ref 6–19)
URATE CRY #/AREA URNS HPF: POSITIVE /HPF
UROBILINOGEN UR STRIP.AUTO-MCNC: 1 MG/DL
WBC # BLD AUTO: 9.8 K/UL (ref 4.8–10.8)
WBC #/AREA URNS HPF: ABNORMAL /HPF

## 2020-11-03 PROCEDURE — 71045 X-RAY EXAM CHEST 1 VIEW: CPT

## 2020-11-03 PROCEDURE — 83690 ASSAY OF LIPASE: CPT

## 2020-11-03 PROCEDURE — 87086 URINE CULTURE/COLONY COUNT: CPT

## 2020-11-03 PROCEDURE — 80053 COMPREHEN METABOLIC PANEL: CPT

## 2020-11-03 PROCEDURE — 700111 HCHG RX REV CODE 636 W/ 250 OVERRIDE (IP): Performed by: STUDENT IN AN ORGANIZED HEALTH CARE EDUCATION/TRAINING PROGRAM

## 2020-11-03 PROCEDURE — 82962 GLUCOSE BLOOD TEST: CPT

## 2020-11-03 PROCEDURE — 83880 ASSAY OF NATRIURETIC PEPTIDE: CPT

## 2020-11-03 PROCEDURE — 81001 URINALYSIS AUTO W/SCOPE: CPT

## 2020-11-03 PROCEDURE — 99285 EMERGENCY DEPT VISIT HI MDM: CPT

## 2020-11-03 PROCEDURE — 87040 BLOOD CULTURE FOR BACTERIA: CPT | Mod: 91

## 2020-11-03 PROCEDURE — 85025 COMPLETE CBC W/AUTO DIFF WBC: CPT

## 2020-11-03 PROCEDURE — 87077 CULTURE AEROBIC IDENTIFY: CPT

## 2020-11-03 PROCEDURE — 770020 HCHG ROOM/CARE - TELE (206)

## 2020-11-03 PROCEDURE — 700101 HCHG RX REV CODE 250: Performed by: EMERGENCY MEDICINE

## 2020-11-03 PROCEDURE — 700117 HCHG RX CONTRAST REV CODE 255: Performed by: EMERGENCY MEDICINE

## 2020-11-03 PROCEDURE — 700105 HCHG RX REV CODE 258: Performed by: STUDENT IN AN ORGANIZED HEALTH CARE EDUCATION/TRAINING PROGRAM

## 2020-11-03 PROCEDURE — 700102 HCHG RX REV CODE 250 W/ 637 OVERRIDE(OP): Performed by: STUDENT IN AN ORGANIZED HEALTH CARE EDUCATION/TRAINING PROGRAM

## 2020-11-03 PROCEDURE — C9803 HOPD COVID-19 SPEC COLLECT: HCPCS | Performed by: INTERNAL MEDICINE

## 2020-11-03 PROCEDURE — 96365 THER/PROPH/DIAG IV INF INIT: CPT

## 2020-11-03 PROCEDURE — 96375 TX/PRO/DX INJ NEW DRUG ADDON: CPT

## 2020-11-03 PROCEDURE — 84484 ASSAY OF TROPONIN QUANT: CPT

## 2020-11-03 PROCEDURE — 74177 CT ABD & PELVIS W/CONTRAST: CPT

## 2020-11-03 PROCEDURE — 93005 ELECTROCARDIOGRAM TRACING: CPT | Performed by: STUDENT IN AN ORGANIZED HEALTH CARE EDUCATION/TRAINING PROGRAM

## 2020-11-03 PROCEDURE — 83605 ASSAY OF LACTIC ACID: CPT

## 2020-11-03 PROCEDURE — A9270 NON-COVERED ITEM OR SERVICE: HCPCS | Performed by: STUDENT IN AN ORGANIZED HEALTH CARE EDUCATION/TRAINING PROGRAM

## 2020-11-03 RX ORDER — METRONIDAZOLE 500 MG/1
500 TABLET ORAL EVERY 8 HOURS
Status: DISCONTINUED | OUTPATIENT
Start: 2020-11-03 | End: 2020-11-09

## 2020-11-03 RX ORDER — POLYETHYLENE GLYCOL 3350 17 G/17G
1 POWDER, FOR SOLUTION ORAL
Status: DISCONTINUED | OUTPATIENT
Start: 2020-11-03 | End: 2020-11-04

## 2020-11-03 RX ORDER — METOPROLOL TARTRATE 1 MG/ML
5 INJECTION, SOLUTION INTRAVENOUS ONCE
Status: COMPLETED | OUTPATIENT
Start: 2020-11-03 | End: 2020-11-03

## 2020-11-03 RX ORDER — METOPROLOL TARTRATE 1 MG/ML
5 INJECTION, SOLUTION INTRAVENOUS ONCE
Status: DISCONTINUED | OUTPATIENT
Start: 2020-11-03 | End: 2020-11-03

## 2020-11-03 RX ORDER — BISACODYL 10 MG
10 SUPPOSITORY, RECTAL RECTAL
Status: DISCONTINUED | OUTPATIENT
Start: 2020-11-03 | End: 2020-11-04

## 2020-11-03 RX ORDER — LABETALOL HYDROCHLORIDE 5 MG/ML
10 INJECTION, SOLUTION INTRAVENOUS EVERY 4 HOURS PRN
Status: DISCONTINUED | OUTPATIENT
Start: 2020-11-03 | End: 2020-11-10

## 2020-11-03 RX ORDER — SPIRONOLACTONE 25 MG/1
25 TABLET ORAL DAILY
Status: DISCONTINUED | OUTPATIENT
Start: 2020-11-04 | End: 2020-11-11

## 2020-11-03 RX ORDER — SODIUM CHLORIDE 9 MG/ML
INJECTION, SOLUTION INTRAVENOUS CONTINUOUS
Status: DISCONTINUED | OUTPATIENT
Start: 2020-11-03 | End: 2020-11-04

## 2020-11-03 RX ORDER — DOXYCYCLINE 100 MG/1
100 TABLET ORAL 2 TIMES DAILY
Status: ON HOLD | COMMUNITY
Start: 2020-10-31 | End: 2020-12-12

## 2020-11-03 RX ORDER — METRONIDAZOLE 500 MG/1
500 TABLET ORAL 3 TIMES DAILY
Status: ON HOLD | COMMUNITY
Start: 2020-10-31 | End: 2020-12-12

## 2020-11-03 RX ORDER — FUROSEMIDE 40 MG/1
40 TABLET ORAL DAILY
Status: DISCONTINUED | OUTPATIENT
Start: 2020-11-04 | End: 2020-11-09

## 2020-11-03 RX ORDER — OLANZAPINE 5 MG/1
7.5 TABLET ORAL
Status: DISCONTINUED | OUTPATIENT
Start: 2020-11-03 | End: 2020-11-17

## 2020-11-03 RX ORDER — DILTIAZEM HYDROCHLORIDE 5 MG/ML
25 INJECTION INTRAVENOUS ONCE
Status: COMPLETED | OUTPATIENT
Start: 2020-11-03 | End: 2020-11-03

## 2020-11-03 RX ORDER — AMOXICILLIN 250 MG
2 CAPSULE ORAL 2 TIMES DAILY
Status: DISCONTINUED | OUTPATIENT
Start: 2020-11-04 | End: 2020-11-04

## 2020-11-03 RX ORDER — DEXTROSE MONOHYDRATE 25 G/50ML
50 INJECTION, SOLUTION INTRAVENOUS
Status: DISCONTINUED | OUTPATIENT
Start: 2020-11-03 | End: 2020-11-12

## 2020-11-03 RX ORDER — CEFDINIR 300 MG/1
300 CAPSULE ORAL 2 TIMES DAILY
Status: ON HOLD | COMMUNITY
Start: 2020-10-31 | End: 2020-12-12

## 2020-11-03 RX ORDER — METOPROLOL SUCCINATE 50 MG/1
50 TABLET, EXTENDED RELEASE ORAL DAILY
Status: DISCONTINUED | OUTPATIENT
Start: 2020-11-04 | End: 2020-11-08

## 2020-11-03 RX ORDER — SERTRALINE HYDROCHLORIDE 25 MG/1
25 TABLET, FILM COATED ORAL DAILY
Status: ON HOLD | COMMUNITY
End: 2020-12-12

## 2020-11-03 RX ORDER — LISINOPRIL 20 MG/1
10 TABLET ORAL DAILY
Status: DISCONTINUED | OUTPATIENT
Start: 2020-11-04 | End: 2020-11-11

## 2020-11-03 RX ADMIN — SODIUM CHLORIDE: 9 INJECTION, SOLUTION INTRAVENOUS at 19:57

## 2020-11-03 RX ADMIN — DILTIAZEM HYDROCHLORIDE 25 MG: 5 INJECTION INTRAVENOUS at 19:01

## 2020-11-03 RX ADMIN — METRONIDAZOLE 500 MG: 500 TABLET ORAL at 19:01

## 2020-11-03 RX ADMIN — INSULIN HUMAN 1 UNITS: 100 INJECTION, SOLUTION PARENTERAL at 23:56

## 2020-11-03 RX ADMIN — OLANZAPINE 7.5 MG: 5 TABLET, FILM COATED ORAL at 21:15

## 2020-11-03 RX ADMIN — METOPROLOL TARTRATE 5 MG: 5 INJECTION, SOLUTION INTRAVENOUS at 15:18

## 2020-11-03 RX ADMIN — INSULIN HUMAN 1 UNITS: 100 INJECTION, SOLUTION PARENTERAL at 21:15

## 2020-11-03 RX ADMIN — IOHEXOL 100 ML: 350 INJECTION, SOLUTION INTRAVENOUS at 16:21

## 2020-11-03 RX ADMIN — CEFTRIAXONE SODIUM 2 G: 2 INJECTION, POWDER, FOR SOLUTION INTRAMUSCULAR; INTRAVENOUS at 19:04

## 2020-11-03 ASSESSMENT — FIBROSIS 4 INDEX
FIB4 SCORE: 1.62
FIB4 SCORE: 1.23

## 2020-11-03 NOTE — ED PROVIDER NOTES
ED Provider Note    Scribed for Satya Godoy M.D. by Evgeny Moncada. 11/3/2020  2:39 PM    Primary care provider: Pcp Pt States None  Means of arrival: EMS  History obtained from: Patient    CHIEF COMPLAINT  Chief Complaint   Patient presents with   • Nausea     Recently admitted and discharged home on abx - reports nausea this am; 4mg zofran given in route   • Atrial Fibrillation     hx of afib w/rvr       HPI  Sebastián Castro is a 55 y.o. male with a history of CHF, diabetes, hypercholesterolemia, hypertension, morbid obesity, who presents to the Emergency Department complaining of nausea and vomiting.  Patient was recently discharged from [facility] on October 31, 2020.     He reports that since that time he has felt unwell.  He remarks that he has had left flank pain, chills without fevers.  He reports he has had nausea and multiple episodes of dry heaves today.  He reports decreased appetite and decreased urine output.  He also complains of mild chest pain and shortness of breath.  He notes his chest pain is worse with movement.    Review of records from [facility]: Patient presented to hospital with shortness of breath, nausea, vomiting, decreased appetite.  He was admitted and continued on home medications and placed on IV Lasix.  Patient began complaining of left-sided flank pain and CT abdomen pelvis showed hepatosplenomegaly and a stable appearing splenic subcapsular hematoma.    Patient continued to have an elevated white count and was started on antibiotics for presumed pneumonia.  Patient also noted to have elevated bilirubin and alk phos; however, an MRCP was not able to be performed secondary to patient's body habitus.  Gallstones were noted on CT.  A HIDA scan was performed which showed low gallbladder function but no cholecystitis.    Unclear source of leukocytosis; however, patient improved after addition of Flagyl.  He was discharged home after several days been bilirubin levels  decreased, leukocytosis improved and patient was recommended outpatient follow-up with general surgery.    REVIEW OF SYSTEMS  Pertinent positives include nausea, vomiting, left leg pain.  Pertinent negatives include no fevers.  All other systems reviewed and negative.    PAST MEDICAL HISTORY   has a past medical history of A-fib (HCC), Chest pain, Congestive heart failure (HCC), Diabetes (HCC), Diabetic neuropathy (HCC), Hypercholesterolemia, Hypertension, Morbid obesity (HCC), Noncompliance, Normal cardiac stress test, and Orthostatic hypotension.    SURGICAL HISTORY   has a past surgical history that includes toe amputation (Right, 11/10/2017); toe amputation (Right, 1/17/2018); irrigation & debridement ortho (Right, 2/19/2018); and zzz cardiac cath (07/21/2018).    SOCIAL HISTORY  Social History     Tobacco Use   • Smoking status: Never Smoker   • Smokeless tobacco: Never Used   Substance Use Topics   • Alcohol use: No   • Drug use: No      Social History     Substance and Sexual Activity   Drug Use No       FAMILY HISTORY  Family History   Problem Relation Age of Onset   • No Known Problems Mother    • No Known Problems Father        CURRENT MEDICATIONS  Home Medications     Reviewed by James Perez on 11/03/20 at 1744  Med List Status: Complete   Medication Last Dose Status   aspirin EC 81 MG EC tablet 11/3/2020 Active   cefdinir (OMNICEF) 300 MG Cap 11/3/2020 Active   doxycycline monohydrate (ADOXA) 100 MG tablet 11/3/2020 Active   furosemide (LASIX) 40 MG Tab 11/3/2020 Active   lisinopril (PRINIVIL) 10 MG Tab 11/3/2020 Active   metoprolol SR (TOPROL XL) 25 MG TABLET SR 24 HR 11/3/2020 Active   metroNIDAZOLE (FLAGYL) 500 MG Tab 11/3/2020 Active   olanzapine (ZYPREXA) 7.5 MG tablet 11/2/2020 Active   sertraline (ZOLOFT) 100 MG Tab  Active   spironolactone (ALDACTONE) 25 MG Tab  Active                ALLERGIES  Allergies   Allergen Reactions   • Daptomycin Rash     Body rash  RXN=1/2018     • Pcn  [Penicillins] Hives and Rash      Rash & hives  RXN=since a kid   • Unasyn [Ampicillin-Sulbactam Sodium] Hives, Itching and Swelling   • Vancomycin Rash     Red man syndrome       PHYSICAL EXAM  VITAL SIGNS: /64   Pulse (!) 151   Temp 36.5 °C (97.7 °F) (Tympanic)   Resp 19   Ht 1.829 m (6')   Wt (!) 169.2 kg (373 lb)   SpO2 97%   BMI 50.59 kg/m²     Constitutional: Morbidly obese male lying in bed in mild distress  HENT: Normocephalic, Atraumatic, Oropharynx moist, No oral exudates.   Eyes: EOMI, Conjunctiva normal, No discharge.   Cardiovascular: Atrial fibrillation, tachycardic in the 150s  Thorax & Lungs: Diminished lung sounds throughout, likely secondary to body habitus, no crackles  Abdomen: Soft, mild left lower quadrant tenderness to palpation, mild suprapubic tenderness palpation.  Back: Left CVA tenderness on palpation  Skin: Warm, Dry, No erythema, No rash.   Extremities:, Lymphedema bilaterally, 2+ pitting  Musculoskeletal: No tenderness to palpation or major deformities noted.   Neurologic: Awake, alert. Moves all extremities spontaneously.  Psychiatric: Affect normal, Judgment normal, Mood normal.     LABS  Results for orders placed or performed during the hospital encounter of 11/03/20   CBC w/ Differential   Result Value Ref Range    WBC 9.8 4.8 - 10.8 K/uL    RBC 5.13 4.70 - 6.10 M/uL    Hemoglobin 12.9 (L) 14.0 - 18.0 g/dL    Hematocrit 41.0 (L) 42.0 - 52.0 %    MCV 79.9 (L) 81.4 - 97.8 fL    MCH 25.1 (L) 27.0 - 33.0 pg    MCHC 31.5 (L) 33.7 - 35.3 g/dL    RDW 49.2 35.9 - 50.0 fL    Platelet Count 364 164 - 446 K/uL    MPV 9.7 9.0 - 12.9 fL    Neutrophils-Polys 64.10 44.00 - 72.00 %    Lymphocytes 21.90 (L) 22.00 - 41.00 %    Monocytes 10.70 0.00 - 13.40 %    Eosinophils 2.00 0.00 - 6.90 %    Basophils 0.50 0.00 - 1.80 %    Immature Granulocytes 0.80 0.00 - 0.90 %    Nucleated RBC 0.00 /100 WBC    Neutrophils (Absolute) 6.25 1.82 - 7.42 K/uL    Lymphs (Absolute) 2.14 1.00 - 4.80 K/uL     Monos (Absolute) 1.05 (H) 0.00 - 0.85 K/uL    Eos (Absolute) 0.20 0.00 - 0.51 K/uL    Baso (Absolute) 0.05 0.00 - 0.12 K/uL    Immature Granulocytes (abs) 0.08 0.00 - 0.11 K/uL    NRBC (Absolute) 0.00 K/uL   Complete Metabolic Panel (CMP)   Result Value Ref Range    Sodium 128 (L) 135 - 145 mmol/L    Potassium 4.2 3.6 - 5.5 mmol/L    Chloride 97 96 - 112 mmol/L    Co2 23 20 - 33 mmol/L    Anion Gap 8.0 7.0 - 16.0    Glucose 247 (H) 65 - 99 mg/dL    Bun 16 8 - 22 mg/dL    Creatinine 0.68 0.50 - 1.40 mg/dL    Calcium 8.5 8.5 - 10.5 mg/dL    AST(SGOT) 36 12 - 45 U/L    ALT(SGPT) 21 2 - 50 U/L    Alkaline Phosphatase 130 (H) 30 - 99 U/L    Total Bilirubin 1.1 0.1 - 1.5 mg/dL    Albumin 2.6 (L) 3.2 - 4.9 g/dL    Total Protein 7.2 6.0 - 8.2 g/dL    Globulin 4.6 (H) 1.9 - 3.5 g/dL    A-G Ratio 0.6 g/dL   Troponin STAT   Result Value Ref Range    Troponin T 38 (H) 6 - 19 ng/L   proBrain Natriuretic Peptide, NT   Result Value Ref Range    NT-proBNP 6197 (H) 0 - 125 pg/mL   LIPASE   Result Value Ref Range    Lipase 156 (H) 11 - 82 U/L   ESTIMATED GFR   Result Value Ref Range    GFR If African American >60 >60 mL/min/1.73 m 2    GFR If Non African American >60 >60 mL/min/1.73 m 2   LACTIC ACID   Result Value Ref Range    Lactic Acid 2.0 0.5 - 2.0 mmol/L   URINALYSIS    Specimen: Urine   Result Value Ref Range    Color DK Yellow     Character Cloudy (A)     Specific Gravity 1.026 <1.035    Ph 5.0 5.0 - 8.0    Glucose Negative Negative mg/dL    Ketones Trace (A) Negative mg/dL    Protein 30 (A) Negative mg/dL    Bilirubin Small (A) Negative    Urobilinogen, Urine 1.0 Negative    Nitrite Negative Negative    Leukocyte Esterase Trace (A) Negative    Occult Blood Negative Negative    Micro Urine Req Microscopic    URINE MICROSCOPIC (W/UA)   Result Value Ref Range    WBC 0-2 (A) /hpf    RBC 2-5 (A) /hpf    Bacteria Negative None /hpf    Epithelial Cells Few /hpf    Uric Acid Crystal Positive /hpf    Hyaline Cast 6-10 (A) /lpf         RADIOLOGY  OUTSIDE IMAGES-CT CHEST   Final Result      OUTSIDE IMAGES-CT ABDOMEN /PELVIS   Final Result      CT-ABDOMEN-PELVIS WITH   Final Result         1.  Peripherally enhancing perisplenic fluid collection which communicates with the splenic flexure of the colon and which contains foci of air is most consistent with an abscess/infected fluid collection. There is nonenhancement of the anterior aspect    of the spleen suggesting infection/infarction. Surgical consultation is recommended.   2.  Hepatomegaly and hepatic steatosis.   3.  Cholelithiasis.   4.  Right renal cortical scarring. No hydronephrosis.   5.  Small left pleural effusion. Mild bibasilar atelectasis.   6.  Subacute to chronic appearing T11 and T12 superior endplate compression fractures.      These findings were discussed with SVETLANA PAREDES on 11/3/2020 4:49 PM.            DX-CHEST-PORTABLE (1 VIEW)   Final Result      Slight interval interval improvement in congestive heart failure.        The radiologist's interpretation of all radiological studies have been reviewed by me.      COURSE & MEDICAL DECISION MAKING  Pertinent Labs & Imaging studies reviewed. (See chart for details)    I reviewed the patient's medical records which showed that patient was recently admitted and discharged from [facility] for concerns of leukocytosis, treated for pneumonia.  Patient also noted to have a subcapsular liquefied hematoma of the spleen.  Patient with elevated bilirubin however in ERCP was unable to be performed secondary to body habitus.  HIDA scan showed decreased gallbladder function but no evidence of acute cholecystitis.    2:39 PM - Patient seen and examined at bedside. Patient will be treated with metoprolol 5 mg.  Ordered CBC, CMP, troponin, lipase, urine culture, EKG, chest x-ray, CT abdomen pelvis.  To evaluate his symptoms. The differential diagnoses include but are not limited to: Sepsis, pneumonia, subcapsular hematoma of  the spleen, electrolyte abnormalities, anemia, A. fib RVR, ACS    4:57 PM -spoke to Dr. Baeza of general surgery who agreed to consult on the patient    5:18 PM -spoke to [facility] family medicine who agrees to admit the patient, Dr. Ozuna     Decision Making:    This is a 55-year-old male with a past medical history of A. fib, CHF, morbid obesity, recently discharged from [facility] for shortness of breath and CHF exacerbation, pneumonia, subcapsular hematoma of the spleen presents with shortness of breath, nausea, vomiting, decreased appetite.    On arrival to the ED, patient is noted to be tachycardic and in A. fib RVR, heart rate 140s to 150s.  Patient blood pressures are stable 130s to 150s, and maintaining oxygen saturations.    Patient given 5 mg push of metoprolol for A. fib RVR and HR improved to 110s-120s.     Laboratory work-up including CBC showed a normal white count of 9.8, hemoglobin of 12.9, CMP showed a mildly low sodium of 128, glucose of 247, alk phos of 130, bilirubin of 1.1, lipase of 156, troponin of 38.    CT abdomen pelvis showed peripherally enhancing perisplenic fluid collection which communicates with the splenic flexure.  Discussed results with radiologist and spoke to Dr. Baeza of general surgery.    Patient started on ceftriaxone and Flagyl for antibiotics.  Fluids held at this time as patient has history of heart failure with reduced ejection fraction.    On reevaluation of the patient he is overall feeling okay with exception of mild nausea.  He reports that he did have a fall about a month and a half ago onto his left side and notes he has had left upper quadrant tenderness and pain since that time.    Spoke to [facility] family medicine who agrees to admit the patient for further evaluation.  Patient understands and agrees to this plan    Medical decision making  Patient was seen and evaluated primarily by the resident, this is a very complex individual with congestive heart failure,  morbid obesity, EF of 35%.  The patient is complaining of left upper quadrant abdominal pain, nausea vomiting, patient is coming in with atrial fibrillation with rapid ventricular response.  Give the patient metoprolol multiple times to keep the patient's heart rate down.  Repeated a CT scan due to the patient's left upper quadrant tenderness palpation, the patient has a known subcapsular hematoma around his spleen from a fall previously, I was unchanged previously, now it has gas and it is slightly smaller, concern is for infection and a communicating with the colon.  I discussed the case with surgery, they will consult on the case.  Discussed the case with the [facility] family practice for hospitalization.  FINAL IMPRESSION  1. Atrial fibrillation with rapid ventricular response (HCC)    2. Left upper quadrant abdominal pain          IEvgeny (Scribe), am scribing for, and in the presence of, Satya Godoy M.D..    Electronically signed by: Evgeny Moncada (Antonina), 11/3/2020    ISatya M.D. personally performed the services described in this documentation, as scribed by Evgeny Moncada in my presence, and it is both accurate and complete.    The note accurately reflects work and decisions made by me.  Satya Godoy M.D.  11/3/2020  6:16 PM

## 2020-11-03 NOTE — ED NOTES
Pt BIB [facility] for Nausea - per EMS pt has started to take abx and is reporting nausea in the am. Dry heaving on arrival denies any active vomiting. Pt has known hx of a-fib w/RVR. Does not use oxygen use at home.

## 2020-11-04 ENCOUNTER — APPOINTMENT (OUTPATIENT)
Dept: RADIOLOGY | Facility: MEDICAL CENTER | Age: 55
DRG: 329 | End: 2020-11-04
Attending: SURGERY
Payer: MEDICAID

## 2020-11-04 LAB
ANION GAP SERPL CALC-SCNC: 9 MMOL/L (ref 7–16)
BACTERIA WND AEROBE CULT: NORMAL
BUN SERPL-MCNC: 14 MG/DL (ref 8–22)
C DIFF DNA SPEC QL NAA+PROBE: NEGATIVE
C DIFF TOX GENS STL QL NAA+PROBE: NEGATIVE
CALCIUM SERPL-MCNC: 8.5 MG/DL (ref 8.5–10.5)
CHLORIDE SERPL-SCNC: 101 MMOL/L (ref 96–112)
CO2 SERPL-SCNC: 25 MMOL/L (ref 20–33)
CREAT SERPL-MCNC: 0.62 MG/DL (ref 0.5–1.4)
EKG IMPRESSION: NORMAL
GLUCOSE BLD-MCNC: 133 MG/DL (ref 65–99)
GLUCOSE BLD-MCNC: 141 MG/DL (ref 65–99)
GLUCOSE BLD-MCNC: 151 MG/DL (ref 65–99)
GLUCOSE BLD-MCNC: 163 MG/DL (ref 65–99)
GLUCOSE SERPL-MCNC: 153 MG/DL (ref 65–99)
INR PPP: 1.27 (ref 0.87–1.13)
LACTATE BLD-SCNC: 1.2 MMOL/L (ref 0.5–2)
POTASSIUM SERPL-SCNC: 3.5 MMOL/L (ref 3.6–5.5)
PROTHROMBIN TIME: 16.3 SEC (ref 12–14.6)
SIGNIFICANT IND 70042: NORMAL
SITE SITE: NORMAL
SODIUM SERPL-SCNC: 135 MMOL/L (ref 135–145)
SOURCE SOURCE: NORMAL
TROPONIN T SERPL-MCNC: 38 NG/L (ref 6–19)

## 2020-11-04 PROCEDURE — 87045 FECES CULTURE AEROBIC BACT: CPT

## 2020-11-04 PROCEDURE — 36415 COLL VENOUS BLD VENIPUNCTURE: CPT

## 2020-11-04 PROCEDURE — 700105 HCHG RX REV CODE 258: Performed by: STUDENT IN AN ORGANIZED HEALTH CARE EDUCATION/TRAINING PROGRAM

## 2020-11-04 PROCEDURE — 85610 PROTHROMBIN TIME: CPT

## 2020-11-04 PROCEDURE — 87077 CULTURE AEROBIC IDENTIFY: CPT

## 2020-11-04 PROCEDURE — A9270 NON-COVERED ITEM OR SERVICE: HCPCS | Performed by: STUDENT IN AN ORGANIZED HEALTH CARE EDUCATION/TRAINING PROGRAM

## 2020-11-04 PROCEDURE — 87075 CULTR BACTERIA EXCEPT BLOOD: CPT

## 2020-11-04 PROCEDURE — 93010 ELECTROCARDIOGRAM REPORT: CPT | Performed by: INTERNAL MEDICINE

## 2020-11-04 PROCEDURE — 079P30Z DRAINAGE OF SPLEEN WITH DRAINAGE DEVICE, PERCUTANEOUS APPROACH: ICD-10-PCS | Performed by: RADIOLOGY

## 2020-11-04 PROCEDURE — 87046 STOOL CULTR AEROBIC BACT EA: CPT

## 2020-11-04 PROCEDURE — 84484 ASSAY OF TROPONIN QUANT: CPT

## 2020-11-04 PROCEDURE — 700111 HCHG RX REV CODE 636 W/ 250 OVERRIDE (IP): Performed by: STUDENT IN AN ORGANIZED HEALTH CARE EDUCATION/TRAINING PROGRAM

## 2020-11-04 PROCEDURE — 700102 HCHG RX REV CODE 250 W/ 637 OVERRIDE(OP): Performed by: STUDENT IN AN ORGANIZED HEALTH CARE EDUCATION/TRAINING PROGRAM

## 2020-11-04 PROCEDURE — 700111 HCHG RX REV CODE 636 W/ 250 OVERRIDE (IP): Performed by: RADIOLOGY

## 2020-11-04 PROCEDURE — 83605 ASSAY OF LACTIC ACID: CPT

## 2020-11-04 PROCEDURE — 87070 CULTURE OTHR SPECIMN AEROBIC: CPT

## 2020-11-04 PROCEDURE — A9270 NON-COVERED ITEM OR SERVICE: HCPCS | Performed by: RADIOLOGY

## 2020-11-04 PROCEDURE — 82962 GLUCOSE BLOOD TEST: CPT | Mod: 91

## 2020-11-04 PROCEDURE — 80048 BASIC METABOLIC PNL TOTAL CA: CPT

## 2020-11-04 PROCEDURE — 99153 MOD SED SAME PHYS/QHP EA: CPT

## 2020-11-04 PROCEDURE — 87205 SMEAR GRAM STAIN: CPT

## 2020-11-04 PROCEDURE — 770020 HCHG ROOM/CARE - TELE (206)

## 2020-11-04 PROCEDURE — 87186 SC STD MICRODIL/AGAR DIL: CPT

## 2020-11-04 PROCEDURE — 700111 HCHG RX REV CODE 636 W/ 250 OVERRIDE (IP)

## 2020-11-04 PROCEDURE — 87493 C DIFF AMPLIFIED PROBE: CPT

## 2020-11-04 PROCEDURE — 700102 HCHG RX REV CODE 250 W/ 637 OVERRIDE(OP): Performed by: RADIOLOGY

## 2020-11-04 RX ORDER — OXYCODONE HYDROCHLORIDE AND ACETAMINOPHEN 5; 325 MG/1; MG/1
1-2 TABLET ORAL EVERY 4 HOURS PRN
Status: DISCONTINUED | OUTPATIENT
Start: 2020-11-04 | End: 2020-11-10

## 2020-11-04 RX ORDER — ACETAMINOPHEN 500 MG
1000 TABLET ORAL EVERY 6 HOURS
Status: COMPLETED | OUTPATIENT
Start: 2020-11-04 | End: 2020-11-05

## 2020-11-04 RX ORDER — OXYCODONE HYDROCHLORIDE 10 MG/1
10 TABLET ORAL
Status: ACTIVE | OUTPATIENT
Start: 2020-11-04 | End: 2020-11-05

## 2020-11-04 RX ORDER — MIDAZOLAM HYDROCHLORIDE 1 MG/ML
.5-2 INJECTION INTRAMUSCULAR; INTRAVENOUS PRN
Status: ACTIVE | OUTPATIENT
Start: 2020-11-04 | End: 2020-11-04

## 2020-11-04 RX ORDER — POTASSIUM CHLORIDE 7.45 MG/ML
10 INJECTION INTRAVENOUS
Status: DISCONTINUED | OUTPATIENT
Start: 2020-11-04 | End: 2020-11-04

## 2020-11-04 RX ORDER — SODIUM CHLORIDE 9 MG/ML
500 INJECTION, SOLUTION INTRAVENOUS
Status: ACTIVE | OUTPATIENT
Start: 2020-11-04 | End: 2020-11-04

## 2020-11-04 RX ORDER — POTASSIUM CHLORIDE 20 MEQ/1
40 TABLET, EXTENDED RELEASE ORAL DAILY
Status: DISCONTINUED | OUTPATIENT
Start: 2020-11-04 | End: 2020-11-10

## 2020-11-04 RX ORDER — MIDAZOLAM HYDROCHLORIDE 1 MG/ML
INJECTION INTRAMUSCULAR; INTRAVENOUS
Status: COMPLETED
Start: 2020-11-04 | End: 2020-11-04

## 2020-11-04 RX ORDER — ONDANSETRON 2 MG/ML
4 INJECTION INTRAMUSCULAR; INTRAVENOUS PRN
Status: ACTIVE | OUTPATIENT
Start: 2020-11-04 | End: 2020-11-04

## 2020-11-04 RX ORDER — OXYCODONE HYDROCHLORIDE 5 MG/1
5 TABLET ORAL
Status: DISPENSED | OUTPATIENT
Start: 2020-11-04 | End: 2020-11-05

## 2020-11-04 RX ORDER — NALOXONE HYDROCHLORIDE 0.4 MG/ML
INJECTION, SOLUTION INTRAMUSCULAR; INTRAVENOUS; SUBCUTANEOUS
Status: COMPLETED
Start: 2020-11-04 | End: 2020-11-04

## 2020-11-04 RX ADMIN — FENTANYL CITRATE 50 MCG: 50 INJECTION, SOLUTION INTRAMUSCULAR; INTRAVENOUS at 17:44

## 2020-11-04 RX ADMIN — ACETAMINOPHEN 1000 MG: 500 TABLET ORAL at 18:35

## 2020-11-04 RX ADMIN — OLANZAPINE 7.5 MG: 5 TABLET, FILM COATED ORAL at 22:19

## 2020-11-04 RX ADMIN — CEFTRIAXONE SODIUM 2 G: 2 INJECTION, POWDER, FOR SOLUTION INTRAMUSCULAR; INTRAVENOUS at 18:14

## 2020-11-04 RX ADMIN — SERTRALINE 125 MG: 100 TABLET, FILM COATED ORAL at 05:18

## 2020-11-04 RX ADMIN — OXYCODONE 5 MG: 5 TABLET ORAL at 19:39

## 2020-11-04 RX ADMIN — MIDAZOLAM HYDROCHLORIDE 2 MG: 1 INJECTION INTRAMUSCULAR; INTRAVENOUS at 17:36

## 2020-11-04 RX ADMIN — LISINOPRIL 10 MG: 5 TABLET ORAL at 05:18

## 2020-11-04 RX ADMIN — FENTANYL CITRATE 50 MCG: 50 INJECTION, SOLUTION INTRAMUSCULAR; INTRAVENOUS at 17:36

## 2020-11-04 RX ADMIN — MIDAZOLAM HYDROCHLORIDE 2 MG: 1 INJECTION, SOLUTION INTRAMUSCULAR; INTRAVENOUS at 17:36

## 2020-11-04 RX ADMIN — SPIRONOLACTONE 25 MG: 25 TABLET ORAL at 05:18

## 2020-11-04 RX ADMIN — METRONIDAZOLE 500 MG: 500 TABLET ORAL at 05:17

## 2020-11-04 RX ADMIN — FUROSEMIDE 40 MG: 40 TABLET ORAL at 05:18

## 2020-11-04 RX ADMIN — METRONIDAZOLE 500 MG: 500 TABLET ORAL at 12:23

## 2020-11-04 RX ADMIN — METRONIDAZOLE 500 MG: 500 TABLET ORAL at 22:19

## 2020-11-04 RX ADMIN — POTASSIUM CHLORIDE 40 MEQ: 1500 TABLET, EXTENDED RELEASE ORAL at 09:36

## 2020-11-04 RX ADMIN — METOPROLOL SUCCINATE 50 MG: 50 TABLET, EXTENDED RELEASE ORAL at 05:18

## 2020-11-04 ASSESSMENT — COGNITIVE AND FUNCTIONAL STATUS - GENERAL
STANDING UP FROM CHAIR USING ARMS: A LITTLE
DRESSING REGULAR LOWER BODY CLOTHING: A LITTLE
TOILETING: A LOT
SUGGESTED CMS G CODE MODIFIER DAILY ACTIVITY: CK
WALKING IN HOSPITAL ROOM: A LITTLE
MOBILITY SCORE: 18
MOVING TO AND FROM BED TO CHAIR: A LITTLE
TURNING FROM BACK TO SIDE WHILE IN FLAT BAD: A LITTLE
HELP NEEDED FOR BATHING: A LOT
DAILY ACTIVITIY SCORE: 16
DRESSING REGULAR UPPER BODY CLOTHING: A LITTLE
SUGGESTED CMS G CODE MODIFIER MOBILITY: CK
CLIMB 3 TO 5 STEPS WITH RAILING: A LITTLE
MOVING FROM LYING ON BACK TO SITTING ON SIDE OF FLAT BED: A LITTLE
EATING MEALS: A LITTLE
PERSONAL GROOMING: A LITTLE

## 2020-11-04 ASSESSMENT — PAIN DESCRIPTION - PAIN TYPE
TYPE: ACUTE PAIN
TYPE: ACUTE PAIN

## 2020-11-04 ASSESSMENT — LIFESTYLE VARIABLES
AVERAGE NUMBER OF DAYS PER WEEK YOU HAVE A DRINK CONTAINING ALCOHOL: 0
TOTAL SCORE: 0
ON A TYPICAL DAY WHEN YOU DRINK ALCOHOL HOW MANY DRINKS DO YOU HAVE: 0
EVER HAD A DRINK FIRST THING IN THE MORNING TO STEADY YOUR NERVES TO GET RID OF A HANGOVER: NO
HAVE YOU EVER FELT YOU SHOULD CUT DOWN ON YOUR DRINKING: NO
HAVE PEOPLE ANNOYED YOU BY CRITICIZING YOUR DRINKING: NO
DOES PATIENT WANT TO STOP DRINKING: NO
TOTAL SCORE: 0
CONSUMPTION TOTAL: NEGATIVE
ALCOHOL_USE: NO
HOW MANY TIMES IN THE PAST YEAR HAVE YOU HAD 5 OR MORE DRINKS IN A DAY: 0
EVER FELT BAD OR GUILTY ABOUT YOUR DRINKING: NO
TOTAL SCORE: 0

## 2020-11-04 ASSESSMENT — FIBROSIS 4 INDEX
FIB4 SCORE: 1.23
FIB4 SCORE: 1.23

## 2020-11-04 NOTE — PROGRESS NOTES
[facility] PROGRESS NOTE     Attending: Stacy Huerta M.D.  Senior Resident: Mariposa Collins M.D. (PGY-3)  Tommy Resident: Jason Peace  (PGY-1)  PATIENT: Sebastián Castro; [id number]; 1965    ID: 55 y.o. male with PMH of morbid obesity, DM 2, CHF admitted for probable perisplenic abscess with possible communication to the left colon.    Overnight Events: No acute events overnight, admitted overnight     Subjective: Still having a lot of abdominal pain and diarrhea, states 8 episodes of diarrhea a day recently, unable to make it to the bathroom     OBJECTIVE:  Temp:  [36.3 °C (97.4 °F)-36.5 °C (97.7 °F)] 36.3 °C (97.4 °F)  Pulse:  [101-152] 101  Resp:  [16-34] 16  BP: (108-156)/(64-92) 140/86  SpO2:  [90 %-97 %] 94 %    Intake/Output Summary (Last 24 hours) at 11/4/2020 0741  Last data filed at 11/3/2020 2108  Gross per 24 hour   Intake --   Output 175 ml   Net -175 ml       PE:  General: Pt resting in NAD, cooperative, morbidly obese  Skin:  Pink, warm and dry.  No rashes  HEENT: NC/AT. EOMI. MMM. No nasal discharge. Oropharynx nonerythematous without exudate/plaques  Neck:  Supple without lymphadenopathy or rigidity. No JVD   Lungs:  Symmetrical.  CTAB with no W/R/R.  Good air movement.  Not on any supplemental oxygen.  Cardiovascular:  S1/S2 tachycardic and irregular without M/R/G.  Abdomen:  Abdomen is soft ND.  Mild tenderness to palpation in the left upper quadrant; otherwise no tenderness to palpation.  No guarding or rebound.  +BS. No masses noted.  Genitourinary:  Deferred.    Extremities:  Full range of motion. No gross deformities noted. 2+ pulses in all extremities. No C/C/E   CNS:  Muscle tone is normal. Cranial nerves II-XII grossly intact. 2+ DTRs.       LABS:  Recent Labs     11/03/20  1345   WBC 9.8   RBC 5.13   HEMOGLOBIN 12.9*   HEMATOCRIT 41.0*   MCV 79.9*   MCH 25.1*   RDW 49.2   PLATELETCT 364   MPV 9.7   NEUTSPOLYS 64.10   LYMPHOCYTES 21.90*   MONOCYTES 10.70   EOSINOPHILS 2.00    BASOPHILS 0.50     Recent Labs     11/03/20  1345 11/04/20  0106   SODIUM 128* 135   POTASSIUM 4.2 3.5*   CHLORIDE 97 101   CO2 23 25   BUN 16 14   CREATININE 0.68 0.62   CALCIUM 8.5 8.5   ALBUMIN 2.6*  --      Estimated GFR/CRCL = Estimated Creatinine Clearance: 196 mL/min (by C-G formula based on SCr of 0.62 mg/dL).  Recent Labs     11/03/20  1345 11/03/20  2114 11/03/20  2355 11/04/20  0106 11/04/20  0608   GLUCOSE 247*  --   --  153*  --    POCGLUCOSE  --  156* 163*  --  133*     Recent Labs     11/03/20  1345   ASTSGOT 36   ALTSGPT 21   TBILIRUBIN 1.1   ALKPHOSPHAT 130*   GLOBULIN 4.6*             No results for input(s): INR, APTT, FIBRINOGEN in the last 72 hours.    Invalid input(s): DIMER    MICROBIOLOGY:   Blood Culture   Date Value Ref Range Status   03/15/2018 No growth after 5 days of incubation.  Final        IMAGING:   OUTSIDE IMAGES-CT CHEST   Final Result      OUTSIDE IMAGES-CT ABDOMEN /PELVIS   Final Result      CT-ABDOMEN-PELVIS WITH   Final Result         1.  Peripherally enhancing perisplenic fluid collection which communicates with the splenic flexure of the colon and which contains foci of air is most consistent with an abscess/infected fluid collection. There is nonenhancement of the anterior aspect    of the spleen suggesting infection/infarction. Surgical consultation is recommended.   2.  Hepatomegaly and hepatic steatosis.   3.  Cholelithiasis.   4.  Right renal cortical scarring. No hydronephrosis.   5.  Small left pleural effusion. Mild bibasilar atelectasis.   6.  Subacute to chronic appearing T11 and T12 superior endplate compression fractures.      These findings were discussed with SVETLANA PAREDES on 11/3/2020 4:49 PM.            DX-CHEST-PORTABLE (1 VIEW)   Final Result      Slight interval interval improvement in congestive heart failure.      IR-DRAIN-PERITONEAL    (Results Pending)       MEDS:  Current Facility-Administered Medications   Medication Last Admin   • potassium  chloride (KCL) ivpb 10 mEq     • furosemide (LASIX) tablet 40 mg 40 mg at 11/04/20 0518   • lisinopril (PRINIVIL) tablet 10 mg 10 mg at 11/04/20 0518   • metoprolol SR (TOPROL XL) tablet 50 mg 50 mg at 11/04/20 0518   • OLANZapine (ZYPREXA) tablet 7.5 mg 7.5 mg at 11/03/20 2115   • sertraline (Zoloft) tablet 125 mg 125 mg at 11/04/20 0518   • spironolactone (ALDACTONE) tablet 25 mg 25 mg at 11/04/20 0518   • insulin regular (HumuLIN R,NovoLIN R) injection Stopped at 11/04/20 0600    And   • glucose 4 g chewable tablet 16 g      And   • dextrose 50% (D50W) injection 50 mL     • senna-docusate (PERICOLACE or SENOKOT S) 8.6-50 MG per tablet 2 Tab      And   • polyethylene glycol/lytes (MIRALAX) PACKET 1 Packet      And   • magnesium hydroxide (MILK OF MAGNESIA) suspension 30 mL      And   • bisacodyl (DULCOLAX) suppository 10 mg     • NS infusion New Bag at 11/03/20 1957   • labetalol (NORMODYNE/TRANDATE) injection 10 mg     • metroNIDAZOLE (FLAGYL) tablet 500 mg 500 mg at 11/04/20 0517   • cefTRIAXone (ROCEPHIN) 2 g in  mL IVPB Stopped at 11/03/20 1934       PROBLEM LIST:  No problems updated.    ASSESSMENT/PLAN:55 y.o. male with PMH of morbid obesity, DM 2, CHF admitted for probable perisplenic abscess with possible communication to the left colon.     # Perisplenic fluid collection, probable abscess  # Left upper quadrant abdominal pain  # Nausea, vomiting  # Probable subcapsular splenic hematoma  # Gram Positive cocci bacteremia with strep   Lipase 156, bili 2.9, HIDA scan with hypokinesis.   General surgery consulted; will follow up and appreciate recommendations  - Continue metronidazole and ceftriaxone  - Pending IR drainage  - Gentle mIVFs while NPO     # Diarrhea  Recent abx use, recent hospitalization  Concern for Cdiff  - Ordered cdiff     # Atrial fibrillation with rapid ventricular response  # Elevated Troponin T  # Elevated BNP  # History of prolonged QTc interval  # Heart failure with reduced  ejection fraction  Troponin elevated to his baseline, patient reports compliance with home metoprolol. Did receive 5 mg of IV metoprolol in the ER with heart rate remaining in the 130s.  Last echocardiogram on file from September 2020 with LVEF of 30.  - Continuous monitoring   - Avoid QT prolonging agents  - Daily weights, and measurement of ins and outs  - As above, fluids to be administered carefully to avoid fluid overload  - Continue lasix 40 mg qday   - Continue lisinopril 10 mg   - Continue metoprolol SR 50 mg qday   - Continue spironolactone 25 mg qday      # Hyponatremia, resolved   Hyponatremic to 128 in ED. Improved to 135 this am   - CTM     # Hypokalemia  - CTM      # Elevated lipase  At this point, will not order repeat lipase.  No evidence of pancreatitis on CT imaging.     # Microcytic anemia, mild  Likely iron deficient anemia versus anemia of chronic disease.    -To follow on outpatient basis     #Diabetes mellitus type 2  Last a1c of 7.7 on 10/22/19  Patient states blood sugar well controlled at home with metformin   -Sliding scale insulin with hypoglycemia protocol     #Protein calorie malnutrition  -Nutrition consult placed     Quality Measures  Quality-Core Measures    #Core Measures   VTE PPx: SCDs pending IR drainage   Abx: Ceftriaxone and flagyl   Lines/Tubes: PIV   Fluids: 83 cc/hr while NPO   Diet: NPO for now   Code Status: Full       Mariposa Collins M.D.   PGY-3  [facility]   [phone]

## 2020-11-04 NOTE — PROGRESS NOTES
2 RN Skin Check    2 RN skin check complete with ELISA Beck.   Devices in place: Nasal Cannula.  Skin assessed under devices: yes.  Confirmed pressure ulcers found on: n/a.  New potential pressure ulcers noted on n/a. Wound consult placed N/A.  The following interventions in place Pillows and Lotion.    BL ears red and slow to swetha. Foam pads in place.   BL elbows pink and blanching.   Sacrum red and blanching.   BL heels red and slow to swetha. BL lower extremities dusky, calloused, and flaky.   Small skin tear to L forearm from prior to arrival.   Scab to anterior r foot. Amputated big toe on R foot noted with complete healing process.   Abdominal panus and BL groin areas excoriated, red, and yeast-like. Cleansed area with soap and water, applied interdry.

## 2020-11-04 NOTE — DIETARY
Nutrition services: Day 1 of admit.  Sebastián Castro is a 55 y.o. male with admitting DX of Intraabdominal fluid collection, Atrial fibrillation with RVR.  Consult received for tube feeding. Pt with high risk diagnosis of protein calorie malnutrition.    Assessment:  Height: 182.9 cm (6')  Weight: (!) 147.1 kg (324 lb 4.8 oz)  Body mass index is 43.98 kg/m²., BMI classification: Morbid Obesity  Diet/Intake: NPO    Evaluation:   1. Pt with history of CHF, Diabetes, Morbid Obesity admitted for Perisplenic fluid collection, probable abscess. Surgery consulted.  2. Currently NPO pending IR drainage of abscess.   3. Per RN. No order for feeding tube. Tube feed consult likely in error. Per H&P, nutrition consult ordered due to protein calorie malnutrition. No history of swallowing difficulty noted in chart.  4. Pt states he has not been eating much for the past week due to nausea and abdominal pain. Pt unclear on weight history.  5. Two different weights recorded on 11/3: 169.2 kg and 147 kg, both on stand up scale. RN re-weighed pt on stand up scale this mornin.1 kg. Pt does have 3+ pitting edema BLE.  6. Weights at recent admit on 20 = 197.1 kg on stand up scale, 9/10/20 = 186.2 kg on stand up scale. Pt with 4+ pitting edema and generalized edema at that admit. Pt was discharged on Lasix. Pt with 39.1 kg weight loss x 2 months, but unclear how much was fluid vs actual weight loss.   7. No muscle or fat loss noted.  8. No pressure injuries per 2 RN skin check.  9. 11/3 Labs include Na 128 (L), Glu 247 (H), Alb 2.6 (L). : Na 135, K+ 3.5 (L)  10. Medications include Lasix, Rocephin, SSI, Flagyl, KCl, Aldactone    Malnutrition Risk: Pt with poor PO intake x 1 week, but unable to clarify amount of weight loss. No fat or muscle loss noted.    Recommendations/Plan:  1. Diet advancement per MD.   2. Once diet advanced, encourage intake of meals.  3. Document intake of all meals as % taken in ADL's to provide  interdisciplinary communication across all shifts.   4. Monitor weight.  5. Nutrition rep will continue to see patient for ongoing meal and snack preferences.     RD following.

## 2020-11-04 NOTE — H&P
[facility] [place] [facility] HISTORY AND PHYSICAL     PATIENT ID:  NAME:  Sebastián Castro  MRN:               [id number]  YOB: 1965    Date of Admission: 11/3/2020     Attending: Dr Lindsay    Resident: Dr. Helena Ozuna    Primary Care Physician:  Kin Sheffield M.D.    CC: Left upper quadrant abdominal pain, nausea and retching with food    HPI: Sebastián Castro is a 55 y.o. male with PMH CHF, morbid obesity, DM 2 who presented with the above chief complaint.  The patient was recently hospitalized at [facility] for 1 week for a similar complaint.  While hospitalized, the patient underwent multiple imaging modalities, and was found to have a subcapsular splenic hematoma.  (The patient states that he fell down at home onto his left side about a month ago, and has had left-sided pain ever since.)      He was also noted to be jaundiced with a maximal bilirubin level of 2.9, as well as some elevation in alk phos.  GI was consulted but declined to perform ERCP, given patient's body habitus.  Instead, HIDA scan was ordered revealing mild hypokinesis of the gallbladder.  The patient was also noted to be hypoxic and imaging revealed fluid on his lungs.  The patient also had leukocytosis to around 20, and it was thought that he might possibly have pneumonia; thus, he was provided with antibiotics during his hospitalization.  He was not discharged to home with any antibiotics.    The patient was discharged to home on October 31, and states that he never felt like himself since going home.  He complains of continued weakness and fatigue, as well as sharp left upper quadrant pain, nausea and dry heaving with eating.  He has felt chills but not had fever at home.    (-) Cough, shortness of breath, chest pain, syncope, presyncope.  The patient does report chronic diarrhea but denies hematochezia and melena.    ER course: CT abdomen was performed revealing a perisplenic collection with air and fluid that appears  to communicate with the splenic flexure of the colon.  Noted also to have subcapsular fluid collection, likely hematoma as well as possible anterior splenic infarct.  General surgery was consulted.  CMP revealed hyponatremia to 128, hyperglycemia 247 and alkaline phosphatase of 130.  Lipase was also elevated to 156.  Troponin T was elevated to 38, which appears to be on the low end of the patient's baseline.  His BNP was elevated to 6200, which is significantly above his baseline of approximately 7285-3309.  Lactic acid was normal at 2.  Blood cultures x2 were collected.    REVIEW OF SYSTEMS:   Ten systems reviewed and were negative except as noted in the HPI.                PAST MEDICAL HISTORY:  Past Medical History:   Diagnosis Date   • A-fib (HCC)    • Chest pain    • Congestive heart failure (HCC)    • Diabetes (HCC)    • Diabetic neuropathy (HCC)    • Hypercholesterolemia    • Hypertension    • Morbid obesity (HCC)    • Noncompliance    • Normal cardiac stress test    • Orthostatic hypotension        PAST SURGICAL HISTORY:  Past Surgical History:   Procedure Laterality Date   • ZZZ CARDIAC CATH  07/21/2018    EF 45%, normal coronaries.   • IRRIGATION & DEBRIDEMENT ORTHO Right 2/19/2018    Procedure: IRRIGATION & DEBRIDEMENT ORTHO-FOOT;  Surgeon: Kirby Lopez M.D.;  Location: [facility];  Service: Orthopedics   • TOE AMPUTATION Right 1/17/2018    Procedure: TOE AMPUTATION-1ST RAY;  Surgeon: Doug Delong M.D.;  Location: [facility];  Service: Orthopedics   • TOE AMPUTATION Right 11/10/2017    Procedure: TOE AMPUTATION;  Surgeon: Osorio Leo M.D.;  Location: [facility];  Service: Orthopedics       FAMILY HISTORY:  Family History   Problem Relation Age of Onset   • No Known Problems Mother    • No Known Problems Father        SOCIAL HISTORY:   Pt lives in a group home in [place].  Is retired.  Smoking denies ever  Etoh use denies ever  Drug use denies ever    DIET:      Orders Placed This Encounter   Procedures   • Diet NPO     Standing Status:   Standing     Number of Occurrences:   1     Order Specific Question:   Restrict to:     Answer:   Sips with Medications [3]       ALLERGIES:  Allergies   Allergen Reactions   • Daptomycin Rash     Body rash  RXN=1/2018     • Pcn [Penicillins] Hives and Rash      Rash & hives  RXN=since a kid  Tolerates cephalosporins   • Unasyn [Ampicillin-Sulbactam Sodium] Hives, Itching and Swelling   • Vancomycin Rash     Red man syndrome       OUTPATIENT MEDICATIONS:    Current Facility-Administered Medications:   •  [START ON 11/4/2020] furosemide (LASIX) tablet 40 mg, 40 mg, Oral, DAILY, Helena Ozuna M.D.  •  [START ON 11/4/2020] lisinopril (PRINIVIL) tablet 10 mg, 10 mg, Oral, DAILY, Helena Ozuna M.D.  •  [START ON 11/4/2020] metoprolol SR (TOPROL XL) tablet 50 mg, 50 mg, Oral, DAILY, Helena Ozuna M.D.  •  OLANZapine (ZYPREXA) tablet 7.5 mg, 7.5 mg, Oral, QHS, Helena Ozuna M.D.  •  [START ON 11/4/2020] sertraline (Zoloft) tablet 125 mg, 125 mg, Oral, DAILY, Helena Ozuna M.D.  •  [START ON 11/4/2020] spironolactone (ALDACTONE) tablet 25 mg, 25 mg, Oral, DAILY, Helena Ozuna M.D.  •  insulin regular (HumuLIN R,NovoLIN R) injection, 1-6 Units, Subcutaneous, Q6HRS **AND** POC Blood Glucose, , , Q6H **AND** NOTIFY MD and PharmD, , , Once **AND** glucose 4 g chewable tablet 16 g, 16 g, Oral, Q15 MIN PRN **AND** dextrose 50% (D50W) injection 50 mL, 50 mL, Intravenous, Q15 MIN PRN, Helena Ozuna M.D.  •  [START ON 11/4/2020] senna-docusate (PERICOLACE or SENOKOT S) 8.6-50 MG per tablet 2 Tab, 2 Tab, Oral, BID **AND** polyethylene glycol/lytes (MIRALAX) PACKET 1 Packet, 1 Packet, Oral, QDAY PRN **AND** magnesium hydroxide (MILK OF MAGNESIA) suspension 30 mL, 30 mL, Oral, QDAY PRN **AND** bisacodyl (DULCOLAX) suppository 10 mg, 10 mg, Rectal, QDAY PRN, Helena Ozuna M.D.  •  NS infusion, ,  Intravenous, Continuous, Helena Ozuna M.D.  •  labetalol (NORMODYNE/TRANDATE) injection 10 mg, 10 mg, Intravenous, Q4HRS PRN, Helena Ozuna M.D.  •  metroNIDAZOLE (FLAGYL) tablet 500 mg, 500 mg, Oral, Q8HRS, Helena Ozuna M.D., 500 mg at 20  •  cefTRIAXone (ROCEPHIN) 2 g in  mL IVPB, 2 g, Intravenous, Q EVENING, Helena Ozuna M.D., Last Rate: 200 mL/hr at 20, 2 g at 20    Current Outpatient Medications:   •  cefdinir (OMNICEF) 300 MG Cap, Take 300 mg by mouth 2 times a day., Disp: , Rfl:   •  doxycycline monohydrate (ADOXA) 100 MG tablet, Take 100 mg by mouth 2 times a day., Disp: , Rfl:   •  metroNIDAZOLE (FLAGYL) 500 MG Tab, Take 500 mg by mouth 3 times a day., Disp: , Rfl:   •  sertraline (ZOLOFT) 25 MG tablet, Take 25 mg by mouth every day. Take with sertraline 100 mg = 125 mg, Disp: , Rfl:   •  aspirin EC 81 MG EC tablet, Take 1 Tab by mouth every day., Disp: 30 Tab, Rfl:   •  furosemide (LASIX) 40 MG Tab, Take 1 Tab by mouth every day., Disp: 30 Tab, Rfl:   •  lisinopril (PRINIVIL) 10 MG Tab, Take 1 Tab by mouth every day., Disp: 30 Tab, Rfl:   •  spironolactone (ALDACTONE) 25 MG Tab, Take 1 Tab by mouth every day., Disp: 30 Tab, Rfl: 3  •  metoprolol SR (TOPROL XL) 25 MG TABLET SR 24 HR, Take 2 Tabs by mouth every day., Disp: 30 Tab, Rfl:   •  olanzapine (ZYPREXA) 7.5 MG tablet, Take 7.5 mg by mouth every bedtime., Disp: , Rfl:   •  sertraline (ZOLOFT) 100 MG Tab, Take 100 mg by mouth every day. Take with sertraline 25 mg = 125 mg, Disp: , Rfl:     PHYSICAL EXAM:  Vitals:    20 1341 20 1401 20 1502 20 1845   BP: 133/75 156/67 122/76 128/82   Pulse: (!) 146 (!) 152 (!) 148 (!) 114   Resp: (!) 32 (!) 34 (!) 21 (!) 25   Temp:       TempSrc:       SpO2: 96% 94% 94% 96%   Weight:       Height:       , Temp (24hrs), Av.5 °C (97.7 °F), Min:36.5 °C (97.7 °F), Max:36.5 °C (97.7 °F)  , Pulse Oximetry: 96 %, O2 Delivery  "Device: None - Room Air    General: Pt resting in NAD, cooperative, morbidly obese  Skin:  Pink, warm and dry.  No rashes  HEENT: NC/AT. EOMI. MMM. No nasal discharge. Oropharynx nonerythematous without exudate/plaques  Neck:  Supple without lymphadenopathy or rigidity. No JVD   Lungs:  Symmetrical.  CTAB with no W/R/R.  Good air movement.  Not on any supplemental oxygen.  Cardiovascular:  S1/S2 tachycardic and irregular without M/R/G.  Abdomen:  Abdomen is soft ND.  Mild tenderness to palpation in the left upper quadrant; otherwise no tenderness to palpation.  No guarding or rebound.  +BS. No masses noted.  Genitourinary:  Deferred.    Extremities:  Full range of motion. No gross deformities noted. 2+ pulses in all extremities. No C/C/E   CNS:  Muscle tone is normal. Cranial nerves II-XII grossly intact. 2+ DTRs.           LAB TESTS:   Recent Labs     11/03/20  1345   WBC 9.8   RBC 5.13   HEMOGLOBIN 12.9*   HEMATOCRIT 41.0*   MCV 79.9*   MCH 25.1*   RDW 49.2   PLATELETCT 364   MPV 9.7   NEUTSPOLYS 64.10   LYMPHOCYTES 21.90*   MONOCYTES 10.70   EOSINOPHILS 2.00   BASOPHILS 0.50         Recent Labs     11/03/20  1345   SODIUM 128*   POTASSIUM 4.2   CHLORIDE 97   CO2 23   BUN 16   CREATININE 0.68   CALCIUM 8.5   ALBUMIN 2.6*       CULTURES:   Results     Procedure Component Value Units Date/Time    BLOOD CULTURE [[id number]] Collected: 11/03/20 1528    Order Status: Completed Specimen: Blood from Peripheral Updated: 11/03/20 1755    Narrative:      Per Hospital Policy: Only change Specimen Src: to \"Line\" if  specified by physician order.    URINALYSIS [[id number]]  (Abnormal) Collected: 11/03/20 1707    Order Status: Completed Specimen: Urine Updated: 11/03/20 1745     Color DK Yellow     Character Cloudy     Specific Gravity 1.026     Ph 5.0     Glucose Negative mg/dL      Ketones Trace mg/dL      Protein 30 mg/dL      Bilirubin Small     Urobilinogen, Urine 1.0     Nitrite Negative     Leukocyte Esterase Trace     " "Occult Blood Negative     Micro Urine Req Microscopic    Narrative:      Indication for culture:->Emergency Room Patient    URINE CULTURE(NEW) [[id number]] Collected: 11/03/20 1707    Order Status: Completed Specimen: Urine Updated: 11/03/20 1713    Narrative:      Indication for culture:->Emergency Room Patient    Routine (COVID/SARS COV-2 In-House PCR up to 24 hours) [[id number]]     Order Status: Completed Specimen: Respirate from Nasopharyngeal     BLOOD CULTURE [[id number]] Collected: 11/03/20 1626    Order Status: Completed Specimen: Blood from Peripheral Updated: 11/03/20 1637    Narrative:      Per Hospital Policy: Only change Specimen Src: to \"Line\" if  specified by physician order.          IMAGES:  OUTSIDE IMAGES-CT CHEST   Final Result      OUTSIDE IMAGES-CT ABDOMEN /PELVIS   Final Result      CT-ABDOMEN-PELVIS WITH   Final Result         1.  Peripherally enhancing perisplenic fluid collection which communicates with the splenic flexure of the colon and which contains foci of air is most consistent with an abscess/infected fluid collection. There is nonenhancement of the anterior aspect    of the spleen suggesting infection/infarction. Surgical consultation is recommended.   2.  Hepatomegaly and hepatic steatosis.   3.  Cholelithiasis.   4.  Right renal cortical scarring. No hydronephrosis.   5.  Small left pleural effusion. Mild bibasilar atelectasis.   6.  Subacute to chronic appearing T11 and T12 superior endplate compression fractures.      These findings were discussed with SVETLANA PAREDES on 11/3/2020 4:49 PM.            DX-CHEST-PORTABLE (1 VIEW)   Final Result      Slight interval interval improvement in congestive heart failure.            CONSULTS:   General surgery    ASSESSMENT/PLAN: 55 y.o. male with PMH of morbid obesity, DM 2, CHF admitted for probable perisplenic abscess with possible communication to the left colon.    #Perisplenic fluid collection, probable abscess  #Left upper quadrant " abdominal pain  #Nausea, vomiting  #Probable subcapsular splenic hematoma  -General surgery consulted; will follow up and appreciate recommendations  -Meanwhile, initiated metronidazole and ceftriaxone  -Patient will remain n.p.o. in anticipation of likely procedure (general surgery versus IR drainage)  -Maintenance fluids at reduced rate, given history of CHF with exacerbation    #Atrial fibrillation with rapid ventricular response  #Elevated Troponin T  #Elevated BNP  #History of prolonged QTc interval  #Heart failure with reduced ejection fraction  Troponin appears to be at patient's baseline, and he is currently asymptomatic from a cardiac standpoint.  Patient reports compliance with home metoprolol, including this morning.  Did receive 5 mg of IV metoprolol in the ER with heart rate remaining in the 130s.  Last echocardiogram on file from September 2020 with LVEF of 30.  -We will trend at least 1 more troponin  -A one-time dose of diltiazem now, to be repeated if heart rate not well controlled following this  -Order EKG for this admission to determine QTc interval  -Meanwhile, avoid QT prolonging agents  -Daily weights, and measurement of ins and outs  -As above, fluids to be administered carefully to avoid fluid overload  -Continue home oral medications    #Hyponatremia  Hyponatremic to 128 in ED.  -We will provide normal saline at reduced rate while patient n.p.o.  -Repeat BMP in the morning    #Elevated lipase  At this point, will not order repeat lipase.  No evidence of pancreatitis on CT imaging.  -Day team to reevaluate clinical status throughout hospitalization    #Microcytic anemia, mild  Likely iron deficient anemia versus anemia of chronic disease.    -To follow on outpatient basis    #Diabetes mellitus type 2  Patient states blood sugar well controlled at home on metoprolol.  Will hold metoprolol at this time.  -Sliding scale insulin with hypoglycemia protocol    #Protein calorie  malnutrition  -Nutrition consult placed    Quality Measures  Quality-Core Measures

## 2020-11-04 NOTE — PROGRESS NOTES
[facility] TRIAGE OFFICER ER REPORT  Consult/Admission requested by: Dr Godoy  Chief complaint: Abdominal pain, chills, fever,  Pertinent history/ER Course: 55-year-old male with a past medical history of A. fib, CHF, morbid obesity, recently discharged from [facility] for shortness of breath and CHF exacerbation, pneumonia, subcapsular hematoma of the spleen presents with shortness of breath, nausea, vomiting, decreased appetite, A. fib with RVR, status post IV metoprolol in ER  CT abd: perisplenic fluid collection which communicates with the splenic flexure of the colon and which contains foci of air is most consistent with an abscess/infected fluid collection  ERP consulted with surgery/Dr. Bazea  Code Status: Full per ERP, I personally verified with the ERP patient's code status and the ERP has confirmed this with the patient.   Patient meets admission criterion: Yes..  Recommendations given or work up & consultations requested per triage officer: Surgery   Consultants involved and pertinent input from consultants: Neurosurgery Dr. Baeza  Admission status: Inpatient.   Admission order placed: Yes.   Floor requested: Telemetry  Assigned hospitalist: Dr Barba

## 2020-11-04 NOTE — PROGRESS NOTES
Pt received from ED. Tele monitor in place. VSS. A&O x4. Pain 0. Pt oriented to room. Educated on use of the call light. Pt demonstrated use of the call light. Discussed POC. All questions answered.

## 2020-11-04 NOTE — ED NOTES
Report called to receiving unit - Lenny RN - receiving RN took report and had no questions or concerns. Pt in stable condition. Belongings sent up with pt. Pt on Zoll during transport. Rocephin infusing on admission. RN indicated that pt did receive Diltiazem 25 mg. Pt indicated to this RN that they do not use insulin at home.

## 2020-11-04 NOTE — ED NOTES
Med rec updated and complete. Allergies reviewed.   Pt is currently on doxycyline, cefdinir and metronidazole. Start date 10/31/20 .  10 day courses.        Home pharmacy [facility]

## 2020-11-04 NOTE — PROGRESS NOTES
Microbiology called with update of positive blood culture. 1 set of 2. Gram positive cocci with possible strep.     Updated [facility].

## 2020-11-04 NOTE — CONSULTS
DATE OF SERVICE:  11/03/2020    REASON FOR SURGERY CONSULTATION:  Subcapsular splenic hematoma.    HISTORY OF PRESENT ILLNESS:  The patient is a 55-year-old male with history   significant for atrial fibrillation, congestive heart failure, diabetes, and   severe morbid obesity, who was admitted to the hospital at [facility]   with left upper quadrant pain.  The patient had a ground level fall landing on   the side of his bathtub approximately 1 month ago.  He had a CT abdomen and   pelvis at [facility] that showed a subcapsular splenic hematoma.  The   patient underwent workup for cholecystitis at that time, it was just shown to   have a reduced ejection fraction on HIDA scan, but no sign of cholecystitis.    He was discharged on oral antibiotics and presented to the hospital today due   to poor pain control.  The patient currently describes the pain is 4/10 to   5/10, dull, it is a constant pain in his left upper quadrant that is   controlled with V pain medication.  CT abdomen and pelvis shows a rim   enhancing perisplenic fluid collection containing gas; it is 8.7x7.9 cm, which   is concerning for abscess or splenic infarct along the anterior aspect of the   spleen.  There is questionable communication with the splenic flexure of the   colon.  There are foci of air within the collection concerning for infection   as well.  The patient also has hepatomegaly and hepatic steatosis.  The   patient denies nausea, vomiting, fevers or hematochezia.  Currently, denies   shortness of breath or chest pain.  He is currently off of his Xarelto.    MEDICATIONS:  Include aspirin 81 mg, Lasix, Zoloft, risperidone, pravastatin,   amiodarone.    ALLERGIES:  TO DAPTOMYCIN, PENICILLIN, UNASYN, VANCOMYCIN.    SOCIAL HISTORY:  Denies tobacco, alcohol, or illicit drugs.    PAST MEDICAL HISTORY:  Includes atrial fibrillation, congestive heart failure,   chronic lumbar compression fracture, severe bilateral lower extremity  venous   stasis, essential hypertension, dyslipidemia, diabetes type 2, on insulin.    REVIEW OF SYSTEMS:  Positive for abdominal pain.  Denies chest pain, shortness   of breath.  He does have bilateral lower extremity swelling.  All other   review of systems on a 12-point review according to the guidelines are   negative except for that stated in the HPI.    PAST SURGICAL HISTORY:  Under toe amputations and debridement of fingers.    PHYSICAL EXAMINATION:  VITAL SIGNS:  Temperature of 97, heart rate irregular 148, blood pressure   122/76, sats are 94% on room air with respiration of 21.  CONSTITUTIONAL:  The patient is morbidly obese, appears comfortable, in no   distress.  Alert and oriented x4.  HEENT:  Normocephalic, atraumatic.  Mucous membranes are moist.  Eyes, no   scleral icterus.  CARDIOVASCULAR:  He is in atrial fibrillation on monitor with heart rate in   the 140s-150s.  EXTREMITIES:  With bilateral lower extremity 3+ pitting edema.  THORAX/LUNGS:  Normal respiratory effort.  No wheezes or stridor.  ABDOMEN:  Soft, nondistended.  The patient does have tenderness to palpation   along his left costal area and left upper quadrant.  No rebound or guarding.  SKIN:  Warm, well perfused.  No petechiae.  NEUROLOGIC:  Cranial nerves II through XII grossly intact.  Normal sensation   and strength in bilateral upper and lower extremities.    LABORATORY DATA:  The patient has a white count of 9.8, hemoglobin 12,   hematocrit 41, platelets 364.  Sodium 128, potassium 4.2, chloride 97,   bicarbonate 23, BUN is 16, creatinine 0.6, AST 36, ALT 21, total bilirubin of   1.1, lipase 156.  Lactic acid 2.0, troponin 38.  ProBNP 6197.    IMAGING:  CT abdomen and pelvis shows an 8.7x7.9 cm peripherally enhancing   perisplenic fluid collection concerning for abscess.  There is no enhancement   on the anterior aspect of the spleen suggesting possible infarction.    ASSESSMENT AND PLAN:  This is a 55-year-old male with history of  atrial   fibrillation, congestive heart failure currently and rapid rate, but his blood   pressure is stable.  He has been admitted to medicine for pain control,   medical management of his atrial fibrillation.  He has a known splenic injury   with a subcapsular hematoma, now there appears to be an infarcted area of the   spleen, which is likely causing his pain.  There is a new perisplenic fluid   collection containing gas.  We will have IR evaluate for percutaneous drainage   and cultures.  For now, continue IV antibiotics.  The patient is clinically   not showing signs of infections or septic shock, but given his amount of pain   and concern for developing an infection of the fluid collection, percutaneous   drainage is recommended.       ____________________________________     MD JULIET Ocasio / NOEMY    DD:  11/03/2020 18:23:08  DT:  11/03/2020 22:23:59    D#:  [id number]  Job#:  [id number]

## 2020-11-04 NOTE — PROGRESS NOTES
12 hour chart check    Monitor Summary    --/.08/--  Rhythm: Atrial Fibrillation  Rate: 107-133            150s nonsustained with ambulation  Ectopy: No Ectopy

## 2020-11-04 NOTE — CARE PLAN
Problem: Safety  Goal: Will remain free from injury  Outcome: PROGRESSING AS EXPECTED   Safety precautions reviewed with pt, pt verbalized understanding and denies questions.  Pt demonstrated ability to use call light for assistance.   Problem: Bowel/Gastric:  Goal: Normal bowel function is maintained or improved  Outcome: PROGRESSING SLOWER THAN EXPECTED   Pt experiencing nausea. Pt still experiencing diarrhea and abdominal pain.

## 2020-11-05 ENCOUNTER — PATIENT OUTREACH (OUTPATIENT)
Dept: HEALTH INFORMATION MANAGEMENT | Facility: OTHER | Age: 55
End: 2020-11-05

## 2020-11-05 LAB
ALBUMIN SERPL BCP-MCNC: 2.6 G/DL (ref 3.2–4.9)
ALBUMIN/GLOB SERPL: 0.6 G/DL
ALP SERPL-CCNC: 108 U/L (ref 30–99)
ALT SERPL-CCNC: 20 U/L (ref 2–50)
ANION GAP SERPL CALC-SCNC: 8 MMOL/L (ref 7–16)
ANISOCYTOSIS BLD QL SMEAR: ABNORMAL
AST SERPL-CCNC: 33 U/L (ref 12–45)
BASOPHILS # BLD AUTO: 0.5 % (ref 0–1.8)
BASOPHILS # BLD: 0.04 K/UL (ref 0–0.12)
BILIRUB SERPL-MCNC: 0.8 MG/DL (ref 0.1–1.5)
BUN SERPL-MCNC: 14 MG/DL (ref 8–22)
CALCIUM SERPL-MCNC: 8.6 MG/DL (ref 8.5–10.5)
CHLORIDE SERPL-SCNC: 101 MMOL/L (ref 96–112)
CO2 SERPL-SCNC: 26 MMOL/L (ref 20–33)
COMMENT 1642: NORMAL
CREAT SERPL-MCNC: 0.84 MG/DL (ref 0.5–1.4)
EOSINOPHIL # BLD AUTO: 0.26 K/UL (ref 0–0.51)
EOSINOPHIL NFR BLD: 3.4 % (ref 0–6.9)
ERYTHROCYTE [DISTWIDTH] IN BLOOD BY AUTOMATED COUNT: 52.6 FL (ref 35.9–50)
GLOBULIN SER CALC-MCNC: 4.1 G/DL (ref 1.9–3.5)
GLUCOSE BLD-MCNC: 135 MG/DL (ref 65–99)
GLUCOSE BLD-MCNC: 167 MG/DL (ref 65–99)
GLUCOSE BLD-MCNC: 171 MG/DL (ref 65–99)
GLUCOSE BLD-MCNC: 188 MG/DL (ref 65–99)
GLUCOSE BLD-MCNC: 192 MG/DL (ref 65–99)
GLUCOSE SERPL-MCNC: 129 MG/DL (ref 65–99)
GRAM STN SPEC: NORMAL
HCT VFR BLD AUTO: 40.8 % (ref 42–52)
HGB BLD-MCNC: 12.2 G/DL (ref 14–18)
IMM GRANULOCYTES # BLD AUTO: 0.04 K/UL (ref 0–0.11)
IMM GRANULOCYTES NFR BLD AUTO: 0.5 % (ref 0–0.9)
LYMPHOCYTES # BLD AUTO: 2.35 K/UL (ref 1–4.8)
LYMPHOCYTES NFR BLD: 31.1 % (ref 22–41)
MACROCYTES BLD QL SMEAR: ABNORMAL
MCH RBC QN AUTO: 25 PG (ref 27–33)
MCHC RBC AUTO-ENTMCNC: 29.9 G/DL (ref 33.7–35.3)
MCV RBC AUTO: 83.6 FL (ref 81.4–97.8)
MICROCYTES BLD QL SMEAR: ABNORMAL
MONOCYTES # BLD AUTO: 0.86 K/UL (ref 0–0.85)
MONOCYTES NFR BLD AUTO: 11.4 % (ref 0–13.4)
MORPHOLOGY BLD-IMP: NORMAL
NEUTROPHILS # BLD AUTO: 4 K/UL (ref 1.82–7.42)
NEUTROPHILS NFR BLD: 53.1 % (ref 44–72)
NRBC # BLD AUTO: 0 K/UL
NRBC BLD-RTO: 0 /100 WBC
OVALOCYTES BLD QL SMEAR: NORMAL
PLATELET # BLD AUTO: 331 K/UL (ref 164–446)
PLATELET BLD QL SMEAR: NORMAL
PMV BLD AUTO: 9.8 FL (ref 9–12.9)
POIKILOCYTOSIS BLD QL SMEAR: NORMAL
POTASSIUM SERPL-SCNC: 4 MMOL/L (ref 3.6–5.5)
PROT SERPL-MCNC: 6.7 G/DL (ref 6–8.2)
RBC # BLD AUTO: 4.88 M/UL (ref 4.7–6.1)
RBC BLD AUTO: PRESENT
SIGNIFICANT IND 70042: NORMAL
SITE SITE: NORMAL
SODIUM SERPL-SCNC: 135 MMOL/L (ref 135–145)
SOURCE SOURCE: NORMAL
WBC # BLD AUTO: 7.6 K/UL (ref 4.8–10.8)

## 2020-11-05 PROCEDURE — 700102 HCHG RX REV CODE 250 W/ 637 OVERRIDE(OP): Performed by: INTERNAL MEDICINE

## 2020-11-05 PROCEDURE — 87040 BLOOD CULTURE FOR BACTERIA: CPT

## 2020-11-05 PROCEDURE — 36415 COLL VENOUS BLD VENIPUNCTURE: CPT

## 2020-11-05 PROCEDURE — 99255 IP/OBS CONSLTJ NEW/EST HI 80: CPT | Performed by: INTERNAL MEDICINE

## 2020-11-05 PROCEDURE — 80053 COMPREHEN METABOLIC PANEL: CPT

## 2020-11-05 PROCEDURE — 700102 HCHG RX REV CODE 250 W/ 637 OVERRIDE(OP): Performed by: STUDENT IN AN ORGANIZED HEALTH CARE EDUCATION/TRAINING PROGRAM

## 2020-11-05 PROCEDURE — A9270 NON-COVERED ITEM OR SERVICE: HCPCS | Performed by: INTERNAL MEDICINE

## 2020-11-05 PROCEDURE — 770020 HCHG ROOM/CARE - TELE (206)

## 2020-11-05 PROCEDURE — A9270 NON-COVERED ITEM OR SERVICE: HCPCS | Performed by: RADIOLOGY

## 2020-11-05 PROCEDURE — A9270 NON-COVERED ITEM OR SERVICE: HCPCS | Performed by: STUDENT IN AN ORGANIZED HEALTH CARE EDUCATION/TRAINING PROGRAM

## 2020-11-05 PROCEDURE — 700102 HCHG RX REV CODE 250 W/ 637 OVERRIDE(OP): Performed by: RADIOLOGY

## 2020-11-05 PROCEDURE — 700105 HCHG RX REV CODE 258: Performed by: STUDENT IN AN ORGANIZED HEALTH CARE EDUCATION/TRAINING PROGRAM

## 2020-11-05 PROCEDURE — 82962 GLUCOSE BLOOD TEST: CPT

## 2020-11-05 PROCEDURE — 700111 HCHG RX REV CODE 636 W/ 250 OVERRIDE (IP): Performed by: STUDENT IN AN ORGANIZED HEALTH CARE EDUCATION/TRAINING PROGRAM

## 2020-11-05 PROCEDURE — 85025 COMPLETE CBC W/AUTO DIFF WBC: CPT

## 2020-11-05 RX ORDER — LINEZOLID 600 MG/1
600 TABLET, FILM COATED ORAL EVERY 12 HOURS
Status: DISCONTINUED | OUTPATIENT
Start: 2020-11-05 | End: 2020-11-05

## 2020-11-05 RX ORDER — FLUCONAZOLE 200 MG/1
400 TABLET ORAL DAILY
Status: DISCONTINUED | OUTPATIENT
Start: 2020-11-05 | End: 2020-11-12

## 2020-11-05 RX ORDER — SODIUM CHLORIDE, SODIUM LACTATE, POTASSIUM CHLORIDE, CALCIUM CHLORIDE 600; 310; 30; 20 MG/100ML; MG/100ML; MG/100ML; MG/100ML
INJECTION, SOLUTION INTRAVENOUS CONTINUOUS
Status: DISCONTINUED | OUTPATIENT
Start: 2020-11-05 | End: 2020-11-08

## 2020-11-05 RX ADMIN — SERTRALINE 125 MG: 100 TABLET, FILM COATED ORAL at 05:46

## 2020-11-05 RX ADMIN — INSULIN HUMAN 1 UNITS: 100 INJECTION, SOLUTION PARENTERAL at 17:26

## 2020-11-05 RX ADMIN — ACETAMINOPHEN 1000 MG: 500 TABLET ORAL at 12:13

## 2020-11-05 RX ADMIN — FUROSEMIDE 40 MG: 40 TABLET ORAL at 05:46

## 2020-11-05 RX ADMIN — SODIUM CHLORIDE, POTASSIUM CHLORIDE, SODIUM LACTATE AND CALCIUM CHLORIDE: 600; 310; 30; 20 INJECTION, SOLUTION INTRAVENOUS at 17:12

## 2020-11-05 RX ADMIN — ACETAMINOPHEN 1000 MG: 500 TABLET ORAL at 05:46

## 2020-11-05 RX ADMIN — CEFTRIAXONE SODIUM 2 G: 2 INJECTION, POWDER, FOR SOLUTION INTRAMUSCULAR; INTRAVENOUS at 17:12

## 2020-11-05 RX ADMIN — SODIUM CHLORIDE, POTASSIUM CHLORIDE, SODIUM LACTATE AND CALCIUM CHLORIDE: 600; 310; 30; 20 INJECTION, SOLUTION INTRAVENOUS at 08:30

## 2020-11-05 RX ADMIN — OXYCODONE 5 MG: 5 TABLET ORAL at 14:39

## 2020-11-05 RX ADMIN — FLUCONAZOLE 400 MG: 200 TABLET ORAL at 11:15

## 2020-11-05 RX ADMIN — OXYCODONE 5 MG: 5 TABLET ORAL at 08:35

## 2020-11-05 RX ADMIN — METRONIDAZOLE 500 MG: 500 TABLET ORAL at 22:35

## 2020-11-05 RX ADMIN — METRONIDAZOLE 500 MG: 500 TABLET ORAL at 14:38

## 2020-11-05 RX ADMIN — INSULIN HUMAN 1 UNITS: 100 INJECTION, SOLUTION PARENTERAL at 00:07

## 2020-11-05 RX ADMIN — POTASSIUM CHLORIDE 40 MEQ: 1500 TABLET, EXTENDED RELEASE ORAL at 05:46

## 2020-11-05 RX ADMIN — OLANZAPINE 7.5 MG: 5 TABLET, FILM COATED ORAL at 22:34

## 2020-11-05 RX ADMIN — INSULIN HUMAN 1 UNITS: 100 INJECTION, SOLUTION PARENTERAL at 22:35

## 2020-11-05 RX ADMIN — SPIRONOLACTONE 25 MG: 25 TABLET ORAL at 05:45

## 2020-11-05 RX ADMIN — LISINOPRIL 10 MG: 5 TABLET ORAL at 05:46

## 2020-11-05 RX ADMIN — METOPROLOL SUCCINATE 50 MG: 50 TABLET, EXTENDED RELEASE ORAL at 05:46

## 2020-11-05 RX ADMIN — METRONIDAZOLE 500 MG: 500 TABLET ORAL at 05:46

## 2020-11-05 RX ADMIN — INSULIN HUMAN 1 UNITS: 100 INJECTION, SOLUTION PARENTERAL at 12:01

## 2020-11-05 RX ADMIN — OXYCODONE HYDROCHLORIDE AND ACETAMINOPHEN 1 TABLET: 5; 325 TABLET ORAL at 22:34

## 2020-11-05 RX ADMIN — ACETAMINOPHEN 1000 MG: 500 TABLET ORAL at 00:03

## 2020-11-05 ASSESSMENT — FIBROSIS 4 INDEX: FIB4 SCORE: 1.27

## 2020-11-05 ASSESSMENT — ENCOUNTER SYMPTOMS
NAUSEA: 1
DIARRHEA: 0
FEVER: 1
SPUTUM PRODUCTION: 0
SHORTNESS OF BREATH: 0
FEVER: 0
ABDOMINAL PAIN: 1
NAUSEA: 0
DOUBLE VISION: 0
COUGH: 0
CHILLS: 1
BLOOD IN STOOL: 0
FOCAL WEAKNESS: 0
BLURRED VISION: 0
MYALGIAS: 0
CONSTIPATION: 0
VOMITING: 0
NERVOUS/ANXIOUS: 0
HEADACHES: 0

## 2020-11-05 ASSESSMENT — PAIN DESCRIPTION - PAIN TYPE
TYPE: ACUTE PAIN

## 2020-11-05 NOTE — NON-PROVIDER
Progress Note               Author: Loree Hobson, Student Date & Time created: 11/5/2020  12:36 PM     Subjective:  Mr Dempsey is a 56 yo male with a PMH significant for CHF, morbid obesity, DMT2 was admitted 2 days ago for perisplenic abscess with splenic infarction and possible communications with colon. Yesterday he had CT-guided perisplenic catheter drainage done with placement of closed/suction BETTE drain. Overnight, there were no acute events. Today patient has chills and abdominal pain. He denies nausea, vomiting but his appetite is still low. His diarrhea has ceased and he has not had a bowel movement since yesterday afternoon and was able to sleep through the night without waking up. He has dysuria and darker than normal urine.     Review of Systems:  Review of Systems   Constitutional: Positive for chills and malaise/fatigue. Negative for fever.   Respiratory: Negative for cough.    Cardiovascular: Negative for chest pain.   Gastrointestinal: Positive for abdominal pain. Negative for blood in stool, diarrhea, nausea and vomiting.   Genitourinary: Positive for dysuria. Negative for frequency and hematuria.   Neurological: Negative for headaches.        Physical Exam:  Physical Exam  Constitutional:       Appearance: Normal appearance. He is obese. He is ill-appearing.   HENT:      Head: Normocephalic and atraumatic.   Cardiovascular:      Rate and Rhythm: Tachycardia present. Rhythm irregular.      Heart sounds: Normal heart sounds. No murmur.   Pulmonary:      Effort: No respiratory distress.      Breath sounds: Normal breath sounds. No wheezing or rales.   Abdominal:      General: Bowel sounds are normal. There is no distension.      Palpations: Abdomen is soft.      Tenderness: There is abdominal tenderness. There is no guarding or rebound.   Skin:     General: Skin is warm and dry.   Neurological:      Mental Status: He is alert and oriented to person, place, and time.   Psychiatric:         Mood and  Affect: Mood normal.         Behavior: Behavior normal.         Labs:          Recent Labs     20  1345 20  0106 20  0251   SODIUM 128* 135 135   POTASSIUM 4.2 3.5* 4.0   CHLORIDE 97 101 101   CO2 23 25 26   BUN 16 14 14   CREATININE 0.68 0.62 0.84   CALCIUM 8.5 8.5 8.6     Recent Labs     20  1345 20  0106 20  0251   ALTSGPT 21  --  20   ASTSGOT 36  --  33   ALKPHOSPHAT 130*  --  108*   TBILIRUBIN 1.1  --  0.8   LIPASE 156*  --   --    GLUCOSE 247* 153* 129*     Recent Labs     20  1345 20  1157 20  0251   RBC 5.13  --  4.88   HEMOGLOBIN 12.9*  --  12.2*   HEMATOCRIT 41.0*  --  40.8*   PLATELETCT 364  --  331   PROTHROMBTM  --  16.3*  --    INR  --  1.27*  --      Recent Labs     20  1345 20  0251   WBC 9.8 7.6   NEUTSPOLYS 64.10 53.10   LYMPHOCYTES 21.90* 31.10   MONOCYTES 10.70 11.40   EOSINOPHILS 2.00 3.40   BASOPHILS 0.50 0.50   ASTSGOT 36 33   ALTSGPT 21 20   ALKPHOSPHAT 130* 108*   TBILIRUBIN 1.1 0.8     Hemodynamics:  Temp (24hrs), Av.2 °C (97.1 °F), Min:35.8 °C (96.5 °F), Max:36.4 °C (97.5 °F)  Temperature: 35.9 °C (96.6 °F)  Pulse  Av  Min: 89  Max: 152   Blood Pressure: (!) 82/72(Nurse notified )     Respiratory:    Respiration: 18, Pulse Oximetry: 93 %     Work Of Breathing / Effort: Mild;Shallow  RUL Breath Sounds: Clear, RML Breath Sounds: Diminished, RLL Breath Sounds: Diminished, ELIAS Breath Sounds: Clear, LLL Breath Sounds: Diminished  Fluids:    Intake/Output Summary (Last 24 hours) at 2020 1236  Last data filed at 2020 0800  Gross per 24 hour   Intake 210 ml   Output 1440 ml   Net -1230 ml     Weight: (!) 147.3 kg (324 lb 11.8 oz)  GI/Nutrition:  Orders Placed This Encounter   Procedures   • Diet Order Diet: Cardiac; Second Modifier: (optional): Consistent CHO (Diabetic)     Standing Status:   Standing     Number of Occurrences:   1     Order Specific Question:   Diet:     Answer:   Cardiac [6]     Order Specific  Question:   Second Modifier: (optional)     Answer:   Consistent CHO (Diabetic) [4]     Medical Decision Making, by Problem:  There are no active hospital problems to display for this patient.      Assessment and Plan:  Mr Dempsey is a 56 yo male with a PMH significant for CHF, morbid obesity, DMT2 was admitted 2 days ago with LUQ pain, nausea and vomiting due to perisplenic abscess with splenic infarction and possible communications with colon.     1) Perisplenic fluid collection, probable abscess.  Gram positive bacteremia with streptococcal sp.  Patient had CT-guided perisplenic catheter drainage done with placement of closed/suction BETTE drain which drained 480 ml of brown fluid.Peritonal drain revealed rare WBCs, few gram positive cocci and few yeasts.   - continue metronidazole and ceftriaxone   - IV maintenance fluids initiation with 100 ml/hr LR    2) Diarrhea   Patient was recently hospitalized and received antibiotics. Patient had diarhear with 6-8 liquid bowel movement per day with nocturnal episodes concerning for C diff infection. He has not had a bowel movement since yesterday afternoon. PCR was negative   - monitor bowel movement   - stool test pending     3) Atrial fibrillation with rapid ventricular response   Telemetry remains rapid atrial fibrillation 90-130s, no acute events.   - continue monitoring   - continue lasix (fursemide) 40 mg PO daily   - continue lisinopril 10 mg PO daily   - continue metoprolol 50 mg PO daily   - continue spironolactone 25 mg daily     4) Hypokalemia, improved  Potassium levels are 4.0 today.     - continue monitoring       Quality-Core Measures

## 2020-11-05 NOTE — CARE PLAN
Problem: Safety  Goal: Will remain free from injury  Outcome: PROGRESSING AS EXPECTED   Safety precautions reviewed with pt, pt verbalized understanding and denies questions.  Pt demonstrated ability to use call light for assistance.   Problem: Bowel/Gastric:  Goal: Normal bowel function is maintained or improved  Outcome: PROGRESSING SLOWER THAN EXPECTED   Pt still experiencing diarrhea.

## 2020-11-05 NOTE — PROGRESS NOTES
"ISIDRA Camacho met with pt at bedside at [facility] to introduce CCM services and to complete inpatient assessment. Pt sees PCP Juli Ohara and states that he has an upcoming appt in \"two weeks\". [facility] provides transportation for pt or he says that he takes a cab. Pt reports no trouble paying for resources such as food, housing or medical care. Pt states that he has a good support system with his brother. Pt lives in a group home through [facility] and has a  named Karol (at [phone]). Pt states that he will be moving into a new group home and that his  Karol is helping with this transition. Pt did not express any questions or concerns for CCM at this time.     Community Health Worker Intake  • Social determinates of health intake completed  • Identified barriers to none  • Contact information provided to Sebastián Castro   • Accepted Meds-To-Beds.   • Inpatient assessment completed.    Plan:  I will follow up with pt via mobile phone call after dc to complete outpatient assessment.         "

## 2020-11-05 NOTE — PROGRESS NOTES
Bedside report received from day RN, pt care assumed, assessment completed. Pt is A&O4, pain 5- provided pain med per MAR. Updated on POC, questions answered. Bed in lowest, locked position, treaded socks on, call light and belongings within reach. Fall precautions in place.

## 2020-11-05 NOTE — CARE PLAN
Problem: Safety  Goal: Will remain free from injury  11/5/2020 0900 by Danielle Soulier-Kelley, R.N.  Outcome: PROGRESSING AS EXPECTED  Note: Call-light within reach.  socks in use. Appropriate signage in room and outside near door. Education regarding safety and fall prevention discussed with patient. Bed is locked and in lowest position. Room is free from clutter and appropriate lighting is present. Alarms in use and staff are present when patient is up out of bed or chair.     11/5/2020 0858 by Danielle Soulier-Kelley, R.N.  Outcome: PROGRESSING AS EXPECTED  Note: Call-light within reach.  socks in use. Appropriate signage in room and outside near door. Education regarding safety and fall prevention discussed with patient. Bed is locked and in lowest position. Room is free from clutter and appropriate lighting is present. Alarms in use and staff are present when patient is up out of bed or chair.     Goal: Will remain free from falls  11/5/2020 0900 by Danielle Soulier-Kelley, R.N.  Outcome: PROGRESSING AS EXPECTED  Note: Call-light within reach.  socks in use. Appropriate signage in room and outside near door. Education regarding safety and fall prevention discussed with patient. Bed is locked and in lowest position. Room is free from clutter and appropriate lighting is present. Alarms in use and staff are present when patient is up out of bed or chair.     11/5/2020 0858 by Danielle Soulier-Kelley, R.N.  Outcome: PROGRESSING AS EXPECTED     Problem: Knowledge Deficit  Goal: Knowledge of disease process/condition, treatment plan, diagnostic tests, and medications will improve  11/5/2020 0900 by Danielle Soulier-Kelley, R.N.  Outcome: PROGRESSING AS EXPECTED  Note: Education discussed with patient including the plan-of-care, treatment plan, medications, mobility, fall prevention, and any other questions the patient may present with.     11/5/2020 0858 by Danielle Soulier-Kelley, R.N.  Outcome:  PROGRESSING AS EXPECTED  Note: Education discussed with patient including the plan-of-care, treatment plan, medications, mobility, fall prevention, and any other questions the patient may present with.     Goal: Knowledge of the prescribed therapeutic regimen will improve  11/5/2020 0900 by Danielle Soulier-Kelley, RLAMONTE.  Outcome: PROGRESSING AS EXPECTED  Note: Education discussed with patient including the plan-of-care, treatment plan, medications, mobility, fall prevention, and any other questions the patient may present with.     11/5/2020 0858 by Danielle Soulier-Kelley, RLAMONTE.  Outcome: PROGRESSING AS EXPECTED  Note: Education discussed with patient including the plan-of-care, treatment plan, medications, mobility, fall prevention, and any other questions the patient may present with.        Problem: Pain Management  Goal: Pain level will decrease to patient's comfort goal  Outcome: PROGRESSING AS EXPECTED  Flowsheets (Taken 11/5/2020 0858)  Non Verbal Scale: Calm

## 2020-11-05 NOTE — OR SURGEON
Immediate Post- Operative Note        PostOp Diagnosis: Perisplenic Abscess      Procedure(s): CT guided Drain Placement      Estimated Blood Loss: Less than 5 ml        Complications: None            11/4/2020     6:01 PM     Florencio Stanley M.D.

## 2020-11-05 NOTE — CONSULTS
Consults   INFECTIOUS DISEASES INPATIENT CONSULT NOTE     Date of Service: 11/5/2020    Consult Requested By:  Dr. Margot Cruz     Reason for Consultation: Intra bdominal abscess and bacteremia    History of Present Illness:   Sebastián Castro is a 55 y.o.  admitted 11/3/2020. Pt has a past medical history of A. fib, CHD, diabetes and severe morbid obesity.  He has been seen by ID in the past for right foot abscess with cultures positive for MRSA and Proteus.  The patient recently had a ground-level fall approximately 1 month ago and was admitted to [facility] with left upper quadrant pain.  CT at the time revealed a subcapsular splenic hematoma.  Work-up for cholecystitis was also done and negative.  The patient was discharged on oral antibiotics.  Patient is now presented to ED complaining of abdominal pain described as 4/5 out of 10 dull and constant located in the left upper quadrant.    Hospital Course:   He has been afebrile and vitals relatively unremarkable.  CT scan on 11/3 revealed a peripherally enhancing perisplenic fluid collection measuring 8.7 x 7.9 cm, communicating with the splenic flexure of the colon and contained foci of air consistent with abscess/infected fluid.  Also noted nonenhancing of the anterior aspect of the spleen suggesting infection/infarction.  Surgery was consulted and recommended IR drainage of the fluid collection.  Today he reports that his abdominal pain has improved since drainage.    Review Of Systems:  Review of Systems   Constitutional: Positive for chills, fever and malaise/fatigue.   HENT: Negative for hearing loss.    Eyes: Negative for blurred vision and double vision.   Respiratory: Negative for cough, sputum production and shortness of breath.    Cardiovascular: Negative for chest pain.   Gastrointestinal: Positive for abdominal pain and nausea. Negative for constipation, diarrhea and vomiting.   Genitourinary: Negative for dysuria.   Musculoskeletal:  Negative for myalgias.   Skin: Negative for rash.   Neurological: Negative for focal weakness and headaches.   Psychiatric/Behavioral: The patient is not nervous/anxious.          PMH:   Past Medical History:   Diagnosis Date   • A-fib (HCC)    • Chest pain    • Congestive heart failure (HCC)    • Diabetes (HCC)    • Diabetic neuropathy (HCC)    • Hypercholesterolemia    • Hypertension    • Morbid obesity (HCC)    • Noncompliance    • Normal cardiac stress test    • Orthostatic hypotension        PSH:  Past Surgical History:   Procedure Laterality Date   • [facility] CARDIAC CATH  07/21/2018    EF 45%, normal coronaries.   • IRRIGATION & DEBRIDEMENT ORTHO Right 2/19/2018    Procedure: IRRIGATION & DEBRIDEMENT ORTHO-FOOT;  Surgeon: Kirby Lopez M.D.;  Location: SURGERY [facility];  Service: Orthopedics   • TOE AMPUTATION Right 1/17/2018    Procedure: TOE AMPUTATION-1ST RAY;  Surgeon: Doug Delong M.D.;  Location: SURGERY [facility];  Service: Orthopedics   • TOE AMPUTATION Right 11/10/2017    Procedure: TOE AMPUTATION;  Surgeon: Osorio Leo M.D.;  Location: SURGERY [facility];  Service: Orthopedics       FAMILY HX:  Family History   Problem Relation Age of Onset   • No Known Problems Mother    • No Known Problems Father        SOCIAL HX:  Social History     Socioeconomic History   • Marital status:      Spouse name: Not on file   • Number of children: Not on file   • Years of education: Not on file   • Highest education level: Not on file   Occupational History   • Not on file   Social Needs   • Financial resource strain: Not hard at all   • Food insecurity     Worry: Never true     Inability: Never true   • Transportation needs     Medical: No     Non-medical: No   Tobacco Use   • Smoking status: Never Smoker   • Smokeless tobacco: Never Used   Substance and Sexual Activity   • Alcohol use: No   • Drug use: No   • Sexual activity: Not on file   Lifestyle   • Physical activity     Days per  week: Not on file     Minutes per session: Not on file   • Stress: Not on file   Relationships   • Social connections     Talks on phone: Not on file     Gets together: Not on file     Attends Hinduism service: Not on file     Active member of club or organization: Not on file     Attends meetings of clubs or organizations: Not on file     Relationship status: Not on file   • Intimate partner violence     Fear of current or ex partner: Not on file     Emotionally abused: Not on file     Physically abused: Not on file     Forced sexual activity: Not on file   Other Topics Concern   • Not on file   Social History Narrative   • Not on file     Social History     Tobacco Use   Smoking Status Never Smoker   Smokeless Tobacco Never Used     Social History     Substance and Sexual Activity   Alcohol Use No       Allergies/Intolerances:  Allergies   Allergen Reactions   • Daptomycin Rash     Body rash  RXN=1/2018     • Pcn [Penicillins] Hives and Rash      Rash & hives  RXN=since a kid  Tolerates cephalosporins   • Unasyn [Ampicillin-Sulbactam Sodium] Hives, Itching and Swelling   • Vancomycin Rash     Red man syndrome       History reviewed with the patient     Other Current Medications:    Current Facility-Administered Medications:   •  lactated ringers infusion, , Intravenous, Continuous, Mariposa Collins M.D., Last Rate: 100 mL/hr at 11/05/20 0830, New Bag at 11/05/20 0830  •  potassium chloride SA (Kdur) tablet 40 mEq, 40 mEq, Oral, DAILY, Mariposa Collins M.D., 40 mEq at 11/05/20 0546  •  oxyCODONE-acetaminophen (PERCOCET) 5-325 MG per tablet 1-2 Tab, 1-2 Tab, Oral, Q4HRS PRN, Mariposa Collins M.D.  •  acetaminophen (TYLENOL) tablet 1,000 mg, 1,000 mg, Oral, Q6HR, Florencio Stanley M.D., 1,000 mg at 11/05/20 0546  •  oxyCODONE immediate-release (ROXICODONE) tablet 5 mg, 5 mg, Oral, Q3HRS PRN, Florencio Stanley M.D., 5 mg at 11/05/20 0835  •  oxyCODONE immediate release (ROXICODONE) tablet 10 mg, 10 mg, Oral,  Q3HRS PRN, Florencio Stanley M.D.  •  furosemide (LASIX) tablet 40 mg, 40 mg, Oral, DAILY, Helena Ozuna M.D., 40 mg at 20  •  lisinopril (PRINIVIL) tablet 10 mg, 10 mg, Oral, DAILY, Helena Ozuna M.D., 10 mg at 20  •  metoprolol SR (TOPROL XL) tablet 50 mg, 50 mg, Oral, DAILY, Helena Ozuna M.D., 50 mg at 20  •  OLANZapine (ZYPREXA) tablet 7.5 mg, 7.5 mg, Oral, QHS, Helena Ozuna M.D., 7.5 mg at 20  •  sertraline (Zoloft) tablet 125 mg, 125 mg, Oral, DAILY, Helena Ozuna M.D., 125 mg at 20  •  spironolactone (ALDACTONE) tablet 25 mg, 25 mg, Oral, DAILY, Helena Ozuna M.D., 25 mg at 2045  •  insulin regular (HumuLIN R,NovoLIN R) injection, 1-6 Units, Subcutaneous, Q6HRS, Stopped at 20 0600 **AND** POC Blood Glucose, , , Q6H **AND** NOTIFY MD and PharmD, , , Once **AND** glucose 4 g chewable tablet 16 g, 16 g, Oral, Q15 MIN PRN **AND** dextrose 50% (D50W) injection 50 mL, 50 mL, Intravenous, Q15 MIN PRN, Helena Ozuna M.D.  •  labetalol (NORMODYNE/TRANDATE) injection 10 mg, 10 mg, Intravenous, Q4HRS PRN, Helena Ozuna M.D.  •  metroNIDAZOLE (FLAGYL) tablet 500 mg, 500 mg, Oral, Q8HRS, Helena Ozuna M.D., 500 mg at 20  •  cefTRIAXone (ROCEPHIN) 2 g in  mL IVPB, 2 g, Intravenous, Q EVENING, Helena Ozuna M.D., Stopped at 20 1844  [unfilled]    Most Recent Vital Signs:  /82   Pulse 100   Temp 35.8 °C (96.5 °F) (Temporal)   Resp 18   Ht 1.829 m (6')   Wt (!) 147.3 kg (324 lb 11.8 oz)   SpO2 93%   BMI 44.04 kg/m²   Temp  Av.4 °C (97.5 °F)  Min: 35.8 °C (96.5 °F)  Max: 37.1 °C (98.8 °F)    Physical Exam:  Physical Exam   Constitutional: He is oriented to person, place, and time and well-developed, well-nourished, and in no distress. No distress.   HENT:   Head: Normocephalic and atraumatic.   Right Ear: External ear normal.      Left Ear: External ear normal.   Nose: Nose normal.   Eyes: Pupils are equal, round, and reactive to light. Conjunctivae and EOM are normal.   Neck: Normal range of motion. Neck supple.   Cardiovascular: Normal rate, regular rhythm and normal heart sounds.   Pulmonary/Chest: Effort normal and breath sounds normal.   Abdominal: Soft. He exhibits no distension. There is abdominal tenderness. There is no rebound and no guarding.   Left upper quadrant tenderness to palpation, drain in place with brown fluid   Musculoskeletal:         General: Edema present.   Neurological: He is alert and oriented to person, place, and time.   Skin: Skin is warm and dry. He is not diaphoretic.   Psychiatric: Affect and judgment normal.           Pertinent Lab Results:  Recent Labs     11/03/20  1345 11/05/20  0251   WBC 9.8 7.6      Recent Labs     11/03/20  1345 11/05/20  0251   HEMOGLOBIN 12.9* 12.2*   HEMATOCRIT 41.0* 40.8*   MCV 79.9* 83.6   MCH 25.1* 25.0*   MACROCYTOSIS  --  1+   ANISOCYTOSIS  --  1+   PLATELETCT 364 331         Recent Labs     11/03/20  1345 11/04/20  0106 11/05/20  0251   SODIUM 128* 135 135   POTASSIUM 4.2 3.5* 4.0   CHLORIDE 97 101 101   CO2 23 25 26   CREATININE 0.68 0.62 0.84        Recent Labs     11/03/20  1345 11/05/20  0251   ALBUMIN 2.6* 2.6*        Pertinent Micro:  Results     Procedure Component Value Units Date/Time    GRAM STAIN [[id number]] Collected: 11/04/20 1730    Order Status: Completed Specimen: Wound Updated: 11/05/20 0137     Significant Indicator .     Source WND     Site Peritoneal Drain     Gram Stain Result Rare WBCs.  Few Gram positive cocci.  Few Yeast.      Narrative:      Special Contact [id number] MAXINE ELLIS  Brown fluid from peritoneal drain  Special Contact [id number] MAXINE ELLIS    Aerobic/Anaerobic Culture (Surgery) [[id number]] Collected: 11/04/20 1730    Order Status: Completed Specimen: Wound from Peritoneal Fluid Updated: 11/05/20 0136     Significant  Indicator NEG     Source WND     Site Peritoneal Drain     Culture Result Order placed in Surgery. Source appropriate culture orders  have been placed in Micro for testing.      Narrative:      Special Contact [id number] MAXINE Stevenson fluid from peritoneal drain  Special Contact [id number] MAXINE ELLIS    C Diff by PCR rflx Toxin [[id number]] Collected: 11/04/20 1609    Order Status: Completed Specimen: Stool Updated: 11/04/20 2202     C Diff by PCR Negative     Comment: C. difficile NOT detected by PCR.  Treatment not indicated per guidelines.  Repeat testing not indicated within 7 days.          027-NAP1-BI Presumptive Negative     Comment: Presumptive 027/NAP1/BI target DNA sequences are NOT DETECTED.       Narrative:      Special Contact [id number] EMIL PRITCHARD  Does this patient have risk factors for C-diff?->Yes    CULTURE WOUND W/ GRAM STAIN [[id number]] Collected: 11/04/20 1730    Order Status: Completed Specimen: Other Updated: 11/04/20 1835    Narrative:      Special Contact [id number] MAXINE Stevenson fluid from peritoneal drain    Anaerobic Culture [[id number]] Collected: 11/04/20 1730    Order Status: Completed Specimen: Other Updated: 11/04/20 1834    Narrative:      Special Contact [id number] MAXINE Stevenson fluid from peritoneal drain    FLUID CULTURE W/GRAM STAIN [[id number]] Collected: 11/04/20 1730    Order Status: Canceled Specimen: Other from Peritoneal Fluid     CULTURE STOOL [[id number]] Collected: 11/04/20 1609    Order Status: Completed Specimen: Stool Updated: 11/04/20 1643    Narrative:      Special Contact [id number] EMIL PRITCHARD  Does this patient have risk factors for C-diff?->Yes    BLOOD CULTURE [[id number]]  (Abnormal) Collected: 11/03/20 1528    Order Status: Completed Specimen: Blood from Peripheral Updated: 11/04/20 1252     Significant Indicator POS     Source BLD     Site PERIPHERAL     Culture Result Growth detected  "by Bactec instrument.  11/04/2020  12:52  Gram Stain: Gram positive cocci: Possible Streptococcus sp.      Narrative:      Per Hospital Policy: Only change Specimen Src: to \"Line\" if  specified by physician order.  No site indicated    BLOOD CULTURE [[id number]] Collected: 11/03/20 1626    Order Status: Completed Specimen: Blood from Peripheral Updated: 11/04/20 0735     Significant Indicator NEG     Source BLD     Site PERIPHERAL     Culture Result No Growth  Note: Blood cultures are incubated for 5 days and  are monitored continuously.Positive blood cultures  are called to the RN and reported as soon as  they are identified.      Narrative:      Per Hospital Policy: Only change Specimen Src: to \"Line\" if  specified by physician order.  No site indicated    URINALYSIS [[id number]]  (Abnormal) Collected: 11/03/20 1707    Order Status: Completed Specimen: Urine Updated: 11/03/20 1745     Color DK Yellow     Character Cloudy     Specific Gravity 1.026     Ph 5.0     Glucose Negative mg/dL      Ketones Trace mg/dL      Protein 30 mg/dL      Bilirubin Small     Urobilinogen, Urine 1.0     Nitrite Negative     Leukocyte Esterase Trace     Occult Blood Negative     Micro Urine Req Microscopic    Narrative:      Indication for culture:->Emergency Room Patient    URINE CULTURE(NEW) [[id number]] Collected: 11/03/20 1707    Order Status: Completed Specimen: Urine Updated: 11/03/20 1713    Narrative:      Indication for culture:->Emergency Room Patient    Routine (COVID/SARS COV-2 In-House PCR up to 24 hours) [[id number]]     Order Status: Completed Specimen: Respirate from Nasopharyngeal         Blood Culture   Date Value Ref Range Status   03/15/2018 No growth after 5 days of incubation.  Final        Studies:  Ct-abdomen-pelvis With    Result Date: 11/3/2020  11/3/2020 4:02 PM HISTORY/REASON FOR EXAM:  Abd pain, unspecified; IV contrast only. TECHNIQUE/EXAM DESCRIPTION:   CT scan of the abdomen and pelvis with contrast. " Contrast-enhanced helical scanning was obtained from the diaphragmatic domes through the pubic symphysis following the bolus administration of nonionic contrast without complication. 100 mL of Omnipaque 350 nonionic contrast was administered without complication. Low dose optimization technique was utilized for this CT exam including automated exposure control and adjustment of the mA and/or kV according to patient size. COMPARISON: 9/26/2018 FINDINGS: Chest Base: Small left pleural effusion with mild bibasilar atelectasis. Heart is mildly enlarged. Liver:  Diffuse hypoattenuation of the liver suggesting fatty infiltration. Moderately enlarged. Gallbladder: Cholelithiasis. Biliary tract: Nondilated. Pancreas: Normal. Spleen: There is a rim-enhancing perisplenic fluid collection which contains air and which communicates with the splenic flexure of the colon. This most likely represents an abscess and measures about 8.7 x 7.9 cm. The anterior aspect of the spleen is nonenhancing with irregular margins could be related to infection of splenic parenchyma or infarction. Adrenals: Normal. Kidneys and Collecting Systems:  There is mild right renal cortical scarring. No hydronephrosis. No renal mass. Gastrointestinal tract:  No bowel obstruction. The appendix is normal. Peritoneum: No free air or free fluid. Reproductive organs:  Normal. Bladder:  Normal. Vessels:  Normal caliber. Lymph  Nodes:  No lymphadenopathy. Abdominal wall: Within normal limits. Bones:  Age indeterminate possibly subacute to chronic T11 and T12 superior endplate compression fractures.     1.  Peripherally enhancing perisplenic fluid collection which communicates with the splenic flexure of the colon and which contains foci of air is most consistent with an abscess/infected fluid collection. There is nonenhancement of the anterior aspect of the spleen suggesting infection/infarction. Surgical consultation is recommended. 2.  Hepatomegaly and hepatic  steatosis. 3.  Cholelithiasis. 4.  Right renal cortical scarring. No hydronephrosis. 5.  Small left pleural effusion. Mild bibasilar atelectasis. 6.  Subacute to chronic appearing T11 and T12 superior endplate compression fractures. These findings were discussed with SVETLANA PAREDES on 11/3/2020 4:49 PM.     Ct-drain-peritoneal    Result Date: 11/4/2020 11/4/2020 5:19 PM HISTORY/REASON FOR EXAM:  Joya Splenic Fluid Collection TECHNIQUE/EXAM DESCRIPTION: LUQ parasplenic abscess drainage with CT guidance. Low dose optimization technique was utilized for this CT exam including automated exposure control and adjustment of the mA and/or kV according to patient size. PROCEDURE: Informed consent was obtained. SEDATION: Moderate sedation was provided. Pulse oximetry and continuous cardiac monitoring by the nurse was performed throughout the exam. Intraservice time was 30 minutes. Localizing CT images were obtained with the patient in supine position. The skin was prepped with Betadine and draped in a sterile fashion. Following local anesthesia with 1% Lidocaine, a 17 G guiding needle was placed and needle path confirmed with CT. An Amplatz guidewire was placed and following serial tract dilatation, a 10 Kinyarwanda pigtail locking catheter was placed. A specimen was collected and submitted for culture and sensitivity and Gram stain. Total of 6 mL of Brownish fluid was drained. The catheter was secured to the skin and connected to suction bulb drainage. Final CT images were obtained documenting catheter position. The patient tolerated the procedure well with no evidence of complication. COMPARISON: CT scan abdomen and pelvis 11/3/2020 FINDINGS: The final CT images show satisfactory catheter position within the target collection.     1.  CT guided perisplenic abscess catheter drainage. 2.  The current plan is to obtain a follow-up CT in 5-7 days..    Dx-chest-portable (1 View)    Result Date: 11/3/2020  11/3/2020 3:41 PM  HISTORY/REASON FOR EXAM:  Chest Pain. TECHNIQUE/EXAM DESCRIPTION AND NUMBER OF VIEWS: Single portable view of the chest. COMPARISON: September 10, 2020 FINDINGS: The cardiac size is enlarged. There is interstitial prominence. Lung aeration has improved. No pneumothorax. No obvious pleural effusion.     Slight interval interval improvement in congestive heart failure.      ASSESSMENT/PLAN:     55 y.o.  admitted 11/3/2020. Pt has a past medical history of A. fib, CHD, diabetes and severe morbid obesity.  He has been seen by ID in the past for right foot abscess with cultures positive for MRSA and Proteus.  Had a ground-level fall approximately 1 month ago and was admitted to [facility] with left upper quadrant pain.  CT at the time revealed a subcapsular splenic hematoma. Presented to ED complaining of abdominal pain described as 4/5 out of 10 dull and constant located in the left upper quadrant.    Hospital Course:   CT scan on 11/3 revealed a peripherally enhancing perisplenic fluid collection measuring 8.7 x 7.9 cm, communicating with the splenic flexure of the colon and contained foci of air consistent with abscess/infected fluid.  Also noted nonenhancing of the anterior aspect of the spleen suggesting infection/infarction.  Surgery was consulted and recommended IR drainage of the fluid collection.  Today he reports that his abdominal pain has improved since drainage.    Problem List   Bacteremia  -Blood cultures on 11/31/2 +Streptococcus species  Perisplenic abscess with possible communication to the bowel  Splenic enhancement on CT, suggesting infection or infarction  -Drainage on 11/4 and peritoneal fluid +GPC-Streptococcus species and yeast  Recent ground-level fall located by a subcapsular splenic hematoma  Diabetes  A. fib  Morbid obesity  Diarrhea  -C. difficile tested negative on 11/4  Antibiotic allergies: Daptomycin rash, penicillins rash and hives, tolerates cephalosporins    Plan   --- Continue  ceftriaxone and Flagyl, patient with allergies limiting antibiotic choices-will continue antibiotics and then repeat CT in 5-7 days from prior depending on drain output  --- Started fluconazole 400 mg daily   --- Monitor drain output  --- Monitor labs  --- Monitor and control blood sugars      Plan of care discussed with medicine team. Will continue to follow    Dianne Fang M.D.

## 2020-11-05 NOTE — PROGRESS NOTES
Patient underwent a peritoneal drain placement by Dr. Stanley, assisted by Peterson ESTEVEZ. Patient, MD and this RN signed consent prior to sedation medication     Patient was placed in a supine position. Vitals were taken every 5 minutes and remained stable during procedure (see doc flow sheet for results). CO2 waveform capnography was monitored and remained 25-35 throughout procedure. Patient appeared to tolerate procedure well, all sedation medication given at discretion of MD Stanley. A percustay dressing was placed over LUQ drain surgical site, secured with medipore tape, drain to bulb suction. Report called to Jason PÉREZ. Pt transported by bed with this ACLS RN to [facility] 805. 8mls brown fluid from peritoneal drain sent to microbiology, hand delivered by Peterson ESTEVEZ     Versa Flexima APDL Locking Pigtail Drainage Catheter System 10Fr   Ref# [id number] Lot# [id number] Exp Date 09/29/2023

## 2020-11-05 NOTE — PROGRESS NOTES
Received report from NOC, RN. Assumed patient care at 0700. Patient is A&O x4. Patient is in bed. POC updated including the whiteboard. Call-light is within reach and the bed is locked and in the lowest position. No other concerns at this time.

## 2020-11-06 LAB
ALBUMIN SERPL BCP-MCNC: 2.3 G/DL (ref 3.2–4.9)
ALBUMIN/GLOB SERPL: 0.6 G/DL
ALP SERPL-CCNC: 105 U/L (ref 30–99)
ALT SERPL-CCNC: 14 U/L (ref 2–50)
ANION GAP SERPL CALC-SCNC: 9 MMOL/L (ref 7–16)
APPEARANCE UR: CLEAR
AST SERPL-CCNC: 23 U/L (ref 12–45)
BACTERIA #/AREA URNS HPF: NEGATIVE /HPF
BACTERIA BLD CULT: ABNORMAL
BACTERIA BLD CULT: ABNORMAL
BACTERIA UR CULT: NORMAL
BASOPHILS # BLD AUTO: 0.5 % (ref 0–1.8)
BASOPHILS # BLD: 0.05 K/UL (ref 0–0.12)
BILIRUB SERPL-MCNC: 0.6 MG/DL (ref 0.1–1.5)
BILIRUB UR QL STRIP.AUTO: NEGATIVE
BUN SERPL-MCNC: 18 MG/DL (ref 8–22)
CALCIUM SERPL-MCNC: 8.3 MG/DL (ref 8.5–10.5)
CHLORIDE SERPL-SCNC: 101 MMOL/L (ref 96–112)
CO2 SERPL-SCNC: 22 MMOL/L (ref 20–33)
COLOR UR: ABNORMAL
CREAT SERPL-MCNC: 0.86 MG/DL (ref 0.5–1.4)
EOSINOPHIL # BLD AUTO: 0.22 K/UL (ref 0–0.51)
EOSINOPHIL NFR BLD: 2.1 % (ref 0–6.9)
EPI CELLS #/AREA URNS HPF: NEGATIVE /HPF
ERYTHROCYTE [DISTWIDTH] IN BLOOD BY AUTOMATED COUNT: 51.6 FL (ref 35.9–50)
GLOBULIN SER CALC-MCNC: 3.9 G/DL (ref 1.9–3.5)
GLUCOSE BLD-MCNC: 147 MG/DL (ref 65–99)
GLUCOSE BLD-MCNC: 157 MG/DL (ref 65–99)
GLUCOSE BLD-MCNC: 159 MG/DL (ref 65–99)
GLUCOSE SERPL-MCNC: 167 MG/DL (ref 65–99)
GLUCOSE UR STRIP.AUTO-MCNC: NEGATIVE MG/DL
HCT VFR BLD AUTO: 37.2 % (ref 42–52)
HGB BLD-MCNC: 11.4 G/DL (ref 14–18)
HYALINE CASTS #/AREA URNS LPF: ABNORMAL /LPF
IMM GRANULOCYTES # BLD AUTO: 0.05 K/UL (ref 0–0.11)
IMM GRANULOCYTES NFR BLD AUTO: 0.5 % (ref 0–0.9)
KETONES UR STRIP.AUTO-MCNC: NEGATIVE MG/DL
LEUKOCYTE ESTERASE UR QL STRIP.AUTO: ABNORMAL
LYMPHOCYTES # BLD AUTO: 1.68 K/UL (ref 1–4.8)
LYMPHOCYTES NFR BLD: 15.7 % (ref 22–41)
MCH RBC QN AUTO: 25.3 PG (ref 27–33)
MCHC RBC AUTO-ENTMCNC: 30.6 G/DL (ref 33.7–35.3)
MCV RBC AUTO: 82.7 FL (ref 81.4–97.8)
MICRO URNS: ABNORMAL
MONOCYTES # BLD AUTO: 1.1 K/UL (ref 0–0.85)
MONOCYTES NFR BLD AUTO: 10.3 % (ref 0–13.4)
NEUTROPHILS # BLD AUTO: 7.63 K/UL (ref 1.82–7.42)
NEUTROPHILS NFR BLD: 70.9 % (ref 44–72)
NITRITE UR QL STRIP.AUTO: NEGATIVE
NRBC # BLD AUTO: 0 K/UL
NRBC BLD-RTO: 0 /100 WBC
PH UR STRIP.AUTO: 5 [PH] (ref 5–8)
PLATELET # BLD AUTO: 306 K/UL (ref 164–446)
PMV BLD AUTO: 9.9 FL (ref 9–12.9)
POTASSIUM SERPL-SCNC: 4.5 MMOL/L (ref 3.6–5.5)
PROT SERPL-MCNC: 6.2 G/DL (ref 6–8.2)
PROT UR QL STRIP: NEGATIVE MG/DL
RBC # BLD AUTO: 4.5 M/UL (ref 4.7–6.1)
RBC # URNS HPF: ABNORMAL /HPF
RBC UR QL AUTO: NEGATIVE
SIGNIFICANT IND 70042: ABNORMAL
SIGNIFICANT IND 70042: NORMAL
SITE SITE: ABNORMAL
SITE SITE: NORMAL
SODIUM SERPL-SCNC: 132 MMOL/L (ref 135–145)
SOURCE SOURCE: ABNORMAL
SOURCE SOURCE: NORMAL
SP GR UR STRIP.AUTO: 1.02
UROBILINOGEN UR STRIP.AUTO-MCNC: 0.2 MG/DL
WBC # BLD AUTO: 10.7 K/UL (ref 4.8–10.8)
WBC #/AREA URNS HPF: ABNORMAL /HPF

## 2020-11-06 PROCEDURE — 700102 HCHG RX REV CODE 250 W/ 637 OVERRIDE(OP): Performed by: STUDENT IN AN ORGANIZED HEALTH CARE EDUCATION/TRAINING PROGRAM

## 2020-11-06 PROCEDURE — 770020 HCHG ROOM/CARE - TELE (206)

## 2020-11-06 PROCEDURE — A9270 NON-COVERED ITEM OR SERVICE: HCPCS | Performed by: INTERNAL MEDICINE

## 2020-11-06 PROCEDURE — 700102 HCHG RX REV CODE 250 W/ 637 OVERRIDE(OP): Performed by: INTERNAL MEDICINE

## 2020-11-06 PROCEDURE — 700105 HCHG RX REV CODE 258: Performed by: STUDENT IN AN ORGANIZED HEALTH CARE EDUCATION/TRAINING PROGRAM

## 2020-11-06 PROCEDURE — 80053 COMPREHEN METABOLIC PANEL: CPT

## 2020-11-06 PROCEDURE — 700111 HCHG RX REV CODE 636 W/ 250 OVERRIDE (IP): Performed by: STUDENT IN AN ORGANIZED HEALTH CARE EDUCATION/TRAINING PROGRAM

## 2020-11-06 PROCEDURE — 82962 GLUCOSE BLOOD TEST: CPT | Mod: 91

## 2020-11-06 PROCEDURE — 85025 COMPLETE CBC W/AUTO DIFF WBC: CPT

## 2020-11-06 PROCEDURE — 81001 URINALYSIS AUTO W/SCOPE: CPT

## 2020-11-06 PROCEDURE — 36415 COLL VENOUS BLD VENIPUNCTURE: CPT

## 2020-11-06 PROCEDURE — A9270 NON-COVERED ITEM OR SERVICE: HCPCS | Performed by: STUDENT IN AN ORGANIZED HEALTH CARE EDUCATION/TRAINING PROGRAM

## 2020-11-06 RX ORDER — ONDANSETRON 4 MG/1
4 TABLET, ORALLY DISINTEGRATING ORAL EVERY 4 HOURS PRN
Status: DISCONTINUED | OUTPATIENT
Start: 2020-11-06 | End: 2020-11-17

## 2020-11-06 RX ADMIN — LISINOPRIL 10 MG: 5 TABLET ORAL at 04:03

## 2020-11-06 RX ADMIN — METRONIDAZOLE 500 MG: 500 TABLET ORAL at 20:58

## 2020-11-06 RX ADMIN — METRONIDAZOLE 500 MG: 500 TABLET ORAL at 04:02

## 2020-11-06 RX ADMIN — SODIUM CHLORIDE, POTASSIUM CHLORIDE, SODIUM LACTATE AND CALCIUM CHLORIDE: 600; 310; 30; 20 INJECTION, SOLUTION INTRAVENOUS at 04:02

## 2020-11-06 RX ADMIN — SERTRALINE 125 MG: 100 TABLET, FILM COATED ORAL at 04:02

## 2020-11-06 RX ADMIN — ONDANSETRON 4 MG: 4 TABLET, ORALLY DISINTEGRATING ORAL at 12:08

## 2020-11-06 RX ADMIN — METOPROLOL SUCCINATE 50 MG: 50 TABLET, EXTENDED RELEASE ORAL at 04:03

## 2020-11-06 RX ADMIN — POTASSIUM CHLORIDE 40 MEQ: 1500 TABLET, EXTENDED RELEASE ORAL at 04:03

## 2020-11-06 RX ADMIN — INSULIN HUMAN 1 UNITS: 100 INJECTION, SOLUTION PARENTERAL at 06:10

## 2020-11-06 RX ADMIN — INSULIN HUMAN 1 UNITS: 100 INJECTION, SOLUTION PARENTERAL at 12:03

## 2020-11-06 RX ADMIN — OXYCODONE HYDROCHLORIDE AND ACETAMINOPHEN 1 TABLET: 5; 325 TABLET ORAL at 23:55

## 2020-11-06 RX ADMIN — SPIRONOLACTONE 25 MG: 25 TABLET ORAL at 04:02

## 2020-11-06 RX ADMIN — CEFTRIAXONE SODIUM 2 G: 2 INJECTION, POWDER, FOR SOLUTION INTRAMUSCULAR; INTRAVENOUS at 17:17

## 2020-11-06 RX ADMIN — ONDANSETRON 4 MG: 4 TABLET, ORALLY DISINTEGRATING ORAL at 17:16

## 2020-11-06 RX ADMIN — OXYCODONE HYDROCHLORIDE AND ACETAMINOPHEN 2 TABLET: 5; 325 TABLET ORAL at 07:08

## 2020-11-06 RX ADMIN — OLANZAPINE 7.5 MG: 5 TABLET, FILM COATED ORAL at 20:58

## 2020-11-06 RX ADMIN — FLUCONAZOLE 400 MG: 200 TABLET ORAL at 04:03

## 2020-11-06 RX ADMIN — FUROSEMIDE 40 MG: 40 TABLET ORAL at 04:03

## 2020-11-06 RX ADMIN — METRONIDAZOLE 500 MG: 500 TABLET ORAL at 15:18

## 2020-11-06 RX ADMIN — SODIUM CHLORIDE, POTASSIUM CHLORIDE, SODIUM LACTATE AND CALCIUM CHLORIDE: 600; 310; 30; 20 INJECTION, SOLUTION INTRAVENOUS at 15:18

## 2020-11-06 ASSESSMENT — PAIN DESCRIPTION - PAIN TYPE
TYPE: ACUTE PAIN

## 2020-11-06 ASSESSMENT — FIBROSIS 4 INDEX: FIB4 SCORE: 1.15

## 2020-11-06 NOTE — CARE PLAN
Problem: Safety  Goal: Will remain free from injury  Outcome: PROGRESSING AS EXPECTED   Safety precautions reviewed with pt, pt verbalized understanding and denies questions.  Pt demonstrated ability to use call light for assistance.   Problem: Bowel/Gastric:  Goal: Normal bowel function is maintained or improved  Outcome: PROGRESSING AS EXPECTED   Pt having decreased episodes of diarrhea and beginning to have more solid stools.

## 2020-11-06 NOTE — PROGRESS NOTES
Trauma/Surgical Progress Note    Author: Eric Kearns M.D. Date & Time created: 11/6/2020   2:06 PM     Interval Events:  Sebastián Castro reports feeling much improved  He states residual discomfort however much improved  Tolerating diet    Hemodynamics:  /75   Pulse (!) 119   Temp 37.2 °C (98.9 °F) (Temporal)   Resp 20   Ht 1.829 m (6')   Wt (!) 152.8 kg (336 lb 13.8 oz)   SpO2 90%      Respiratory:    Respiration: 20, Pulse Oximetry: 90 %     Work Of Breathing / Effort: Mild;Shallow  RUL Breath Sounds: Clear, RML Breath Sounds: Diminished, RLL Breath Sounds: Diminished, ELIAS Breath Sounds: Clear, LLL Breath Sounds: Diminished  Fluids:    Intake/Output Summary (Last 24 hours) at 11/6/2020 1406  Last data filed at 11/6/2020 0700  Gross per 24 hour   Intake --   Output 350 ml   Net -350 ml     Admit Weight: (!) 169.2 kg (373 lb)  Current Weight: (!) 152.8 kg (336 lb 13.8 oz)    Physical Exam  Awake alert appropriate  Breathing with ease no cough no distress  Abdomen soft nondistended modest tenderness  Drain in place  Medical Decision Making/Problem List:    There are no active hospital problems to display for this patient.    Core Measures & Quality Metrics:  Core Measures & Quality Metrics  LEONOR Score      Assessment/Plan  Recovering well  Continue drain care  Antibiotics as directed by infectious disease  Encourage incentive spirometry and ambulation  Abdominal exam  Follow-up labs  Follow-up imaging

## 2020-11-06 NOTE — CARE PLAN
Problem: Nutritional:  Goal: Achieve adequate nutritional intake  Description: Advance diet as tolerated past clear liquids. Patient will consume >50% of meals.  Outcome: PROGRESSING AS EXPECTED   Diet advanced to Consistent CHO, Cardiac. Recorded PO intake % x 2 meals 11/5. Pt states he did not eat as well this morning. He requests a yogurt for HS snack. Encouraged PO intake. RD following.

## 2020-11-06 NOTE — PROGRESS NOTES
[facility] PROGRESS NOTE     PATIENT: Sebastián Castro;   MRN:  [id number]   :  1965    Attending: Sara Duran MD  Senior Resident: Yelena Horvath MD  Re-entry Attending: Antony Freed MD    Overnight Events:  Telemetry remains rapid Atrial Fibrillation , no specific events.  Drained smaller but unquantified amount overnight (< 10 ml at 5 am, < 50 ml by 8 am) of brown-bilious fluid from L abdomen BETTE drain.  Subjectively lighter than yesterday.      Subjective: Despite tolerating solid food yesterday, he is so nauseated today that he was unable to manage eating, and struggles to meet his needs.  He reports increasing abdominal pain today, though it is reasonably managed with his pain regimen.    He also reports dysuria with urination.  His diarrhea is slowing but not stopped, with pain most severe when he gets up to the bathroom.  Diuresis seems effective and swelling seems less to patient.     OBJECTIVE:  Temp:  [36.5 °C (97.7 °F)-37.2 °C (98.9 °F)] 36.6 °C (97.9 °F)  Pulse:  [105-124] 124  Resp:  [18-20] 18  BP: (109-122)/(63-80) 109/63  SpO2:  [90 %-96 %] 92 %    Intake/Output Summary (Last 24 hours) at 2020 0946  Last data filed at 2020 0700  Gross per 24 hour   Intake --   Output 350 ml   Net -350 ml       PE:  General: Pt resting in mild discomfort, more pain than yesterday, cooperative, morbidly obese  Skin:  Pink, warm and dry.  No rashes  HEENT: NC/AT. EOMI. MMM. No nasal discharge. Oropharynx nonerythematous without exudate/plaques  Neck:  Supple without lymphadenopathy or rigidity. No JVD   Lungs:  Symmetrical.  CTAB with no W/R/R.  Good air movement.  Not on any supplemental oxygen.  Cardiovascular:  S1/S2 tachycardic and irregular without M/R/G.  Abdomen:  Abdomen is soft ND.  Mild tenderness to palpation diffusely across abdomen, nilda RUQ, drain in place with scant brown fluid .  No guarding or rebound.  +BS. No masses noted.  Genitourinary:  Deferred.    Extremities:   Full range of motion. No gross deformities noted. 2+ pulses in all extremities. No cyanosis. 1+ bilateral pedal edema  CNS:  Muscle tone is normal. Cranial nerves II-XII grossly intact. 2+ DTRs. Affect and judgment normal.   Ambulatory to bathroom      LABS:  Recent Labs     11/03/20  1345 11/05/20  0251 11/06/20  0121   WBC 9.8 7.6 10.7   RBC 5.13 4.88 4.50*   HEMOGLOBIN 12.9* 12.2* 11.4*   HEMATOCRIT 41.0* 40.8* 37.2*   MCV 79.9* 83.6 82.7   MCH 25.1* 25.0* 25.3*   RDW 49.2 52.6* 51.6*   PLATELETCT 364 331 306   MPV 9.7 9.8 9.9   NEUTSPOLYS 64.10 53.10 70.90   LYMPHOCYTES 21.90* 31.10 15.70*   MONOCYTES 10.70 11.40 10.30   EOSINOPHILS 2.00 3.40 2.10   BASOPHILS 0.50 0.50 0.50   RBCMORPHOLO  --  Present  --      Recent Labs     11/03/20  1345 11/04/20  0106 11/05/20 0251 11/06/20  0121   SODIUM 128* 135 135 132*   POTASSIUM 4.2 3.5* 4.0 4.5   CHLORIDE 97 101 101 101   CO2 23 25 26 22   BUN 16 14 14 18   CREATININE 0.68 0.62 0.84 0.86   CALCIUM 8.5 8.5 8.6 8.3*   ALBUMIN 2.6*  --  2.6* 2.3*     Estimated GFR/CRCL = Estimated Creatinine Clearance: 144.4 mL/min (by C-G formula based on SCr of 0.86 mg/dL).  Recent Labs     11/04/20  0106 11/04/20  0106 11/05/20  0251 11/05/20  0251 11/05/20  1645 11/05/20  2234 11/06/20  0121 11/06/20  0607   GLUCOSE 153*  --  129*  --   --   --  167*  --    POCGLUCOSE  --    < >  --    < > 171* 167*  --  159*    < > = values in this interval not displayed.     Recent Labs     11/03/20  1345 11/04/20  1157 11/05/20  0251 11/06/20  0121   ASTSGOT 36  --  33 23   ALTSGPT 21  --  20 14   TBILIRUBIN 1.1  --  0.8 0.6   ALKPHOSPHAT 130*  --  108* 105*   GLOBULIN 4.6*  --  4.1* 3.9*   INR  --  1.27*  --   --              Recent Labs     11/04/20  1157   INR 1.27*       MICROBIOLOGY: Matching GPC (Strep Sp) in blood and drain/wound cultures.    Blood Culture   Date Value Ref Range Status   03/15/2018 No growth after 5 days of incubation.  Final        IMAGING:   CT-DRAIN-PERITONEAL   Final  Result      1.  CT guided perisplenic abscess catheter drainage.   2.  The current plan is to obtain a follow-up CT in 5-7 days..      OUTSIDE IMAGES-CT CHEST   Final Result      OUTSIDE IMAGES-CT ABDOMEN /PELVIS   Final Result      CT-ABDOMEN-PELVIS WITH   Final Result   Addendum 1 of 1   Addendum:   A prior noncontrast CT of the abdomen and pelvis from [facility] dated 10/19/2020 was made available for comparison. On the    prior study a perisplenic/subcapsular fluid collection was seen however    did not contain air at that time and    there was no discrete evidence of communication with the transverse colon.    Communication with splenic flexure of the colon and air within the    collection are new from the prior study.      Final      DX-CHEST-PORTABLE (1 VIEW)   Final Result      Slight interval interval improvement in congestive heart failure.          MEDS:  Current Facility-Administered Medications   Medication Last Admin   • lactated ringers infusion New Bag at 11/06/20 0402   • fluconazole (DIFLUCAN) tablet 400 mg 400 mg at 11/06/20 0403   • potassium chloride SA (Kdur) tablet 40 mEq 40 mEq at 11/06/20 0403   • oxyCODONE-acetaminophen (PERCOCET) 5-325 MG per tablet 1-2 Tab 2 Tab at 11/06/20 0708   • furosemide (LASIX) tablet 40 mg 40 mg at 11/06/20 0403   • lisinopril (PRINIVIL) tablet 10 mg 10 mg at 11/06/20 0403   • metoprolol SR (TOPROL XL) tablet 50 mg 50 mg at 11/06/20 0403   • OLANZapine (ZYPREXA) tablet 7.5 mg 7.5 mg at 11/05/20 2234   • sertraline (Zoloft) tablet 125 mg 125 mg at 11/06/20 0402   • spironolactone (ALDACTONE) tablet 25 mg 25 mg at 11/06/20 0402   • insulin regular (HumuLIN R,NovoLIN R) injection 1 Units at 11/06/20 0610    And   • glucose 4 g chewable tablet 16 g      And   • dextrose 50% (D50W) injection 50 mL     • labetalol (NORMODYNE/TRANDATE) injection 10 mg     • metroNIDAZOLE (FLAGYL) tablet 500 mg 500 mg at 11/06/20 0402   • cefTRIAXone (ROCEPHIN) 2 g  in  mL IVPB Stopped at 11/05/20 1742       ASSESSMENT/PLAN:55 y.o. male with PMH of morbid obesity, DM 2, CHF admitted for perisplenic abscess with communication to the left colon.  Gradually diminishing drainage from abscess region.  Increasing nausea and abdominal pain, decreasing diarrhea frequency.     # Perisplenic fluid collection, probable abscess  # Left upper quadrant abdominal pain  # Nausea, vomiting  # Probable subcapsular splenic hematoma  # Gram Positive cocci bacteremia with strep   Peritoneal drain showing rare WBCs, few gram positive cocci, few yeast and blood culture showed similar pattern  Lipase 156, bili 2.9, HIDA scan with hypokinesis.   General surgery following and have not changed anticoagulation or drain plan at this point.  - Continue metronidazole and ceftriaxone with fluconazole added by Infectious disease     # Diarrhea  Recent abx use, recent hospitalization.  C. diff negative and diarrhea diminishing somewhat.  Continue to monitor.    # Atrial fibrillation with rapid ventricular response  Was not on anticoagulation at home, and he does not know why.  Does not appear he has been following up with cardiology regularly, though he has a cardiologist whom he has seen.    # Elevated Troponin T  # Elevated BNP  # History of prolonged QTc interval  # Heart failure with reduced ejection fraction  Troponin elevated to his baseline, patient reports compliance with home metoprolol. Did receive 5 mg of IV metoprolol in the ER with heart rate remaining in the 130s.  Last echocardiogram on file from September 2020 with LVEF of 30.  - Continuous monitoring   - Considering increasing beta blockade but will reassess tomorrow  - Daily weights, and measurement of ins and outs  - As above, fluids to be administered carefully to avoid fluid overload  - Continue lasix 40 mg qday   - Continue lisinopril 10 mg   - Continue metoprolol SR 50 mg qday (consider increasing to BID)  - Continue spironolactone  25 mg qday      # Hyponatremia, resolved   Hyponatremic to 128 in ED. Again at 135 this am holding steady  - CTM      # Hypokalemia, improving  Improved to 4.0 today, normal at admission but low yesterday 3.5  - CTM      # Elevated lipase  No evidence of pancreatitis on CT imaging.  Consider repeat due to increased abdominal pain.     # Microcytic anemia, mild  Likely iron deficient anemia versus anemia of chronic disease.  Stable.  -To follow on outpatient basis     #Diabetes mellitus type 2  Last a1c of 7.7 on 10/22/19  Patient states blood sugar well controlled at home with metformin   -Sliding scale insulin with hypoglycemia protocol     # Dysuria  -UA ordered, pending     Quality Measures  Quality-Core Measures     #Core Measures   VTE PPx: SCDs pending IR drainage   Abx: Ceftriaxone and flagyl   Lines/Tubes: PIV   Fluids: N   Diet: Diabetic  Code Status: Full      Antony Freed MD   [facility]   [phone]

## 2020-11-06 NOTE — CARE PLAN
Problem: Safety  Goal: Will remain free from injury  Outcome: PROGRESSING AS EXPECTED  Note: Call-light within reach.  socks in use. Appropriate signage in room and outside near door. Education regarding safety and fall prevention discussed with patient. Bed is locked and in lowest position. Room is free from clutter and appropriate lighting is present. Alarms in use and staff are present when patient is up out of bed or chair.     Goal: Will remain free from falls  Outcome: PROGRESSING AS EXPECTED  Note: Call-light within reach.  socks in use. Appropriate signage in room and outside near door. Education regarding safety and fall prevention discussed with patient. Bed is locked and in lowest position. Room is free from clutter and appropriate lighting is present. Alarms in use and staff are present when patient is up out of bed or chair.        Problem: Venous Thromboembolism (VTW)/Deep Vein Thrombosis (DVT) Prevention:  Goal: Patient will participate in Venous Thrombosis (VTE)/Deep Vein Thrombosis (DVT)Prevention Measures  Outcome: PROGRESSING AS EXPECTED     Problem: Bowel/Gastric:  Goal: Normal bowel function is maintained or improved  Outcome: PROGRESSING AS EXPECTED     Problem: Fluid Volume:  Goal: Will maintain balanced intake and output  Outcome: PROGRESSING AS EXPECTED     Problem: Knowledge Deficit  Goal: Knowledge of disease process/condition, treatment plan, diagnostic tests, and medications will improve  Outcome: MET  Goal: Knowledge of the prescribed therapeutic regimen will improve  Outcome: MET

## 2020-11-06 NOTE — PROGRESS NOTES
Bedside report received from day RN, pt care assumed, assessment completed. Pt is A&O4, pain 8- provided heat pack and declines pain medication at this time. Updated on POC, questions answered. Bed in lowest, locked position, treaded socks on, call light and belongings within reach. Fall precautions in place.

## 2020-11-06 NOTE — PROGRESS NOTES
Surgical Progress Note    Author: Laura Baeza M.D. Date & Time created: 2020   4:57 PM     Interval Events:  S/p Percutaneous drain placement, vj splenic abscess  Micro: Gram + cocci, rare yeast    Reported 480cc purulent fluid initially drained  Patient still with LUQ pain  Denies chest pain or shortness of breath  ROS  Hemodynamics:  Temp (24hrs), Av.1 °C (97 °F), Min:35.8 °C (96.5 °F), Max:36.3 °C (97.4 °F)  Temperature: 36 °C (96.8 °F)  Pulse  Av.6  Min: 62  Max: 152   Blood Pressure: 107/71     Respiratory:    Respiration: 16, Pulse Oximetry: 96 %     Drain: 30cc purulent/sanguinous in bulb       Fluids:    Intake/Output Summary (Last 24 hours) at 2020 1657  Last data filed at 2020 0800  Gross per 24 hour   Intake 210 ml   Output 640 ml   Net -430 ml     Weight: (!) 147.3 kg (324 lb 11.8 oz)  Current Diet Order   Procedures   • Diet Order Diet: Cardiac; Second Modifier: (optional): Consistent CHO (Diabetic)     Physical Exam  Labs:  Recent Results (from the past 24 hour(s))   Aerobic/Anaerobic Culture (Surgery)    Collection Time: 20  5:30 PM    Specimen: Peritoneal Fluid; Wound   Result Value Ref Range    Significant Indicator NEG     Source WND     Site Peritoneal Drain     Culture Result       Order placed in Surgery. Source appropriate culture orders  have been placed in Micro for testing.     Anaerobic Culture    Collection Time: 20  5:30 PM    Specimen: Wound   Result Value Ref Range    Significant Indicator NEG     Source WND     Site Peritoneal Drain     Culture Result Culture in progress.    CULTURE WOUND W/ GRAM STAIN    Collection Time: 20  5:30 PM    Specimen: Wound   Result Value Ref Range    Significant Indicator NEG     Source WND     Site Peritoneal Drain     Culture Result Culture in progress.     Gram Stain Result Rare WBCs.  Few Gram positive cocci.  Few Yeast.   (A)    GRAM STAIN    Collection Time: 20  5:30 PM    Specimen: Wound   Result  Value Ref Range    Significant Indicator .     Source WND     Site Peritoneal Drain     Gram Stain Result Rare WBCs.  Few Gram positive cocci.  Few Yeast.      ACCU-CHEK GLUCOSE    Collection Time: 11/04/20  6:39 PM   Result Value Ref Range    Glucose - Accu-Ck 141 (H) 65 - 99 mg/dL   ACCU-CHEK GLUCOSE    Collection Time: 11/05/20 12:05 AM   Result Value Ref Range    Glucose - Accu-Ck 188 (H) 65 - 99 mg/dL   CBC WITH DIFFERENTIAL    Collection Time: 11/05/20  2:51 AM   Result Value Ref Range    WBC 7.6 4.8 - 10.8 K/uL    RBC 4.88 4.70 - 6.10 M/uL    Hemoglobin 12.2 (L) 14.0 - 18.0 g/dL    Hematocrit 40.8 (L) 42.0 - 52.0 %    MCV 83.6 81.4 - 97.8 fL    MCH 25.0 (L) 27.0 - 33.0 pg    MCHC 29.9 (L) 33.7 - 35.3 g/dL    RDW 52.6 (H) 35.9 - 50.0 fL    Platelet Count 331 164 - 446 K/uL    MPV 9.8 9.0 - 12.9 fL    Neutrophils-Polys 53.10 44.00 - 72.00 %    Lymphocytes 31.10 22.00 - 41.00 %    Monocytes 11.40 0.00 - 13.40 %    Eosinophils 3.40 0.00 - 6.90 %    Basophils 0.50 0.00 - 1.80 %    Immature Granulocytes 0.50 0.00 - 0.90 %    Nucleated RBC 0.00 /100 WBC    Neutrophils (Absolute) 4.00 1.82 - 7.42 K/uL    Lymphs (Absolute) 2.35 1.00 - 4.80 K/uL    Monos (Absolute) 0.86 (H) 0.00 - 0.85 K/uL    Eos (Absolute) 0.26 0.00 - 0.51 K/uL    Baso (Absolute) 0.04 0.00 - 0.12 K/uL    Immature Granulocytes (abs) 0.04 0.00 - 0.11 K/uL    NRBC (Absolute) 0.00 K/uL    Anisocytosis 1+     Macrocytosis 1+     Microcytosis 1+    Comp Metabolic Panel    Collection Time: 11/05/20  2:51 AM   Result Value Ref Range    Sodium 135 135 - 145 mmol/L    Potassium 4.0 3.6 - 5.5 mmol/L    Chloride 101 96 - 112 mmol/L    Co2 26 20 - 33 mmol/L    Anion Gap 8.0 7.0 - 16.0    Glucose 129 (H) 65 - 99 mg/dL    Bun 14 8 - 22 mg/dL    Creatinine 0.84 0.50 - 1.40 mg/dL    Calcium 8.6 8.5 - 10.5 mg/dL    AST(SGOT) 33 12 - 45 U/L    ALT(SGPT) 20 2 - 50 U/L    Alkaline Phosphatase 108 (H) 30 - 99 U/L    Total Bilirubin 0.8 0.1 - 1.5 mg/dL    Albumin 2.6 (L)  3.2 - 4.9 g/dL    Total Protein 6.7 6.0 - 8.2 g/dL    Globulin 4.1 (H) 1.9 - 3.5 g/dL    A-G Ratio 0.6 g/dL   PERIPHERAL SMEAR REVIEW    Collection Time: 11/05/20  2:51 AM   Result Value Ref Range    Peripheral Smear Review see below    PLATELET ESTIMATE    Collection Time: 11/05/20  2:51 AM   Result Value Ref Range    Plt Estimation Normal    MORPHOLOGY    Collection Time: 11/05/20  2:51 AM   Result Value Ref Range    RBC Morphology Present     Poikilocytosis 1+     Ovalocytes 1+    DIFFERENTIAL COMMENT    Collection Time: 11/05/20  2:51 AM   Result Value Ref Range    Comments-Diff see below    ESTIMATED GFR    Collection Time: 11/05/20  2:51 AM   Result Value Ref Range    GFR If African American >60 >60 mL/min/1.73 m 2    GFR If Non African American >60 >60 mL/min/1.73 m 2   ACCU-CHEK GLUCOSE    Collection Time: 11/05/20  6:20 AM   Result Value Ref Range    Glucose - Accu-Ck 135 (H) 65 - 99 mg/dL   ACCU-CHEK GLUCOSE    Collection Time: 11/05/20 11:21 AM   Result Value Ref Range    Glucose - Accu-Ck 192 (H) 65 - 99 mg/dL     Medical Decision Making, by Problem:  There are no active hospital problems to display for this patient.    A/P  Patient with recent fall, subsequent subcapsular hematoma of spleen  Joya splenic abscess  Continue Drain: flushes per IR protocol     antibiotics Per ID, final cultures pending

## 2020-11-07 LAB
ALBUMIN SERPL BCP-MCNC: 2.3 G/DL (ref 3.2–4.9)
ALBUMIN/GLOB SERPL: 0.5 G/DL
ALP SERPL-CCNC: 93 U/L (ref 30–99)
ALT SERPL-CCNC: 11 U/L (ref 2–50)
ANION GAP SERPL CALC-SCNC: 11 MMOL/L (ref 7–16)
AST SERPL-CCNC: 23 U/L (ref 12–45)
BACTERIA SPEC ANAEROBE CULT: NORMAL
BACTERIA STL CULT: NORMAL
BASOPHILS # BLD AUTO: 0.6 % (ref 0–1.8)
BASOPHILS # BLD: 0.06 K/UL (ref 0–0.12)
BILIRUB SERPL-MCNC: 0.7 MG/DL (ref 0.1–1.5)
BUN SERPL-MCNC: 21 MG/DL (ref 8–22)
CALCIUM SERPL-MCNC: 8.6 MG/DL (ref 8.5–10.5)
CHLORIDE SERPL-SCNC: 98 MMOL/L (ref 96–112)
CO2 SERPL-SCNC: 21 MMOL/L (ref 20–33)
CREAT SERPL-MCNC: 1 MG/DL (ref 0.5–1.4)
EOSINOPHIL # BLD AUTO: 0.17 K/UL (ref 0–0.51)
EOSINOPHIL NFR BLD: 1.8 % (ref 0–6.9)
ERYTHROCYTE [DISTWIDTH] IN BLOOD BY AUTOMATED COUNT: 55.1 FL (ref 35.9–50)
GLOBULIN SER CALC-MCNC: 4.3 G/DL (ref 1.9–3.5)
GLUCOSE BLD-MCNC: 132 MG/DL (ref 65–99)
GLUCOSE BLD-MCNC: 134 MG/DL (ref 65–99)
GLUCOSE BLD-MCNC: 135 MG/DL (ref 65–99)
GLUCOSE BLD-MCNC: 140 MG/DL (ref 65–99)
GLUCOSE BLD-MCNC: 148 MG/DL (ref 65–99)
GLUCOSE SERPL-MCNC: 146 MG/DL (ref 65–99)
HCT VFR BLD AUTO: 43.1 % (ref 42–52)
HGB BLD-MCNC: 12.6 G/DL (ref 14–18)
IMM GRANULOCYTES # BLD AUTO: 0.05 K/UL (ref 0–0.11)
IMM GRANULOCYTES NFR BLD AUTO: 0.5 % (ref 0–0.9)
LIPASE SERPL-CCNC: 25 U/L (ref 11–82)
LYMPHOCYTES # BLD AUTO: 2.02 K/UL (ref 1–4.8)
LYMPHOCYTES NFR BLD: 21.4 % (ref 22–41)
MCH RBC QN AUTO: 25.1 PG (ref 27–33)
MCHC RBC AUTO-ENTMCNC: 29.2 G/DL (ref 33.7–35.3)
MCV RBC AUTO: 85.9 FL (ref 81.4–97.8)
MONOCYTES # BLD AUTO: 1.29 K/UL (ref 0–0.85)
MONOCYTES NFR BLD AUTO: 13.7 % (ref 0–13.4)
NEUTROPHILS # BLD AUTO: 5.83 K/UL (ref 1.82–7.42)
NEUTROPHILS NFR BLD: 62 % (ref 44–72)
NRBC # BLD AUTO: 0 K/UL
NRBC BLD-RTO: 0 /100 WBC
PLATELET # BLD AUTO: 245 K/UL (ref 164–446)
PMV BLD AUTO: 10.5 FL (ref 9–12.9)
POTASSIUM SERPL-SCNC: 4.8 MMOL/L (ref 3.6–5.5)
PROT SERPL-MCNC: 6.6 G/DL (ref 6–8.2)
RBC # BLD AUTO: 5.02 M/UL (ref 4.7–6.1)
SIGNIFICANT IND 70042: NORMAL
SIGNIFICANT IND 70042: NORMAL
SITE SITE: NORMAL
SITE SITE: NORMAL
SODIUM SERPL-SCNC: 130 MMOL/L (ref 135–145)
SOURCE SOURCE: NORMAL
SOURCE SOURCE: NORMAL
WBC # BLD AUTO: 9.4 K/UL (ref 4.8–10.8)

## 2020-11-07 PROCEDURE — 700111 HCHG RX REV CODE 636 W/ 250 OVERRIDE (IP): Performed by: STUDENT IN AN ORGANIZED HEALTH CARE EDUCATION/TRAINING PROGRAM

## 2020-11-07 PROCEDURE — A9270 NON-COVERED ITEM OR SERVICE: HCPCS | Performed by: STUDENT IN AN ORGANIZED HEALTH CARE EDUCATION/TRAINING PROGRAM

## 2020-11-07 PROCEDURE — 99233 SBSQ HOSP IP/OBS HIGH 50: CPT | Performed by: INTERNAL MEDICINE

## 2020-11-07 PROCEDURE — A9270 NON-COVERED ITEM OR SERVICE: HCPCS | Performed by: INTERNAL MEDICINE

## 2020-11-07 PROCEDURE — 700102 HCHG RX REV CODE 250 W/ 637 OVERRIDE(OP): Performed by: STUDENT IN AN ORGANIZED HEALTH CARE EDUCATION/TRAINING PROGRAM

## 2020-11-07 PROCEDURE — 80053 COMPREHEN METABOLIC PANEL: CPT

## 2020-11-07 PROCEDURE — 770020 HCHG ROOM/CARE - TELE (206)

## 2020-11-07 PROCEDURE — 82962 GLUCOSE BLOOD TEST: CPT | Mod: 91

## 2020-11-07 PROCEDURE — 700105 HCHG RX REV CODE 258: Performed by: STUDENT IN AN ORGANIZED HEALTH CARE EDUCATION/TRAINING PROGRAM

## 2020-11-07 PROCEDURE — 36415 COLL VENOUS BLD VENIPUNCTURE: CPT

## 2020-11-07 PROCEDURE — 85025 COMPLETE CBC W/AUTO DIFF WBC: CPT

## 2020-11-07 PROCEDURE — 700102 HCHG RX REV CODE 250 W/ 637 OVERRIDE(OP): Performed by: INTERNAL MEDICINE

## 2020-11-07 PROCEDURE — 83690 ASSAY OF LIPASE: CPT

## 2020-11-07 RX ORDER — LINEZOLID 600 MG/1
600 TABLET, FILM COATED ORAL EVERY 12 HOURS
Status: DISCONTINUED | OUTPATIENT
Start: 2020-11-07 | End: 2020-11-17

## 2020-11-07 RX ADMIN — ONDANSETRON 4 MG: 4 TABLET, ORALLY DISINTEGRATING ORAL at 11:08

## 2020-11-07 RX ADMIN — METRONIDAZOLE 500 MG: 500 TABLET ORAL at 14:22

## 2020-11-07 RX ADMIN — METRONIDAZOLE 500 MG: 500 TABLET ORAL at 21:27

## 2020-11-07 RX ADMIN — ONDANSETRON 4 MG: 4 TABLET, ORALLY DISINTEGRATING ORAL at 16:48

## 2020-11-07 RX ADMIN — LINEZOLID 600 MG: 600 TABLET, FILM COATED ORAL at 14:22

## 2020-11-07 RX ADMIN — SERTRALINE 125 MG: 100 TABLET, FILM COATED ORAL at 05:03

## 2020-11-07 RX ADMIN — POTASSIUM CHLORIDE 40 MEQ: 1500 TABLET, EXTENDED RELEASE ORAL at 05:03

## 2020-11-07 RX ADMIN — OLANZAPINE 7.5 MG: 5 TABLET, FILM COATED ORAL at 21:27

## 2020-11-07 RX ADMIN — SODIUM CHLORIDE, POTASSIUM CHLORIDE, SODIUM LACTATE AND CALCIUM CHLORIDE: 600; 310; 30; 20 INJECTION, SOLUTION INTRAVENOUS at 02:22

## 2020-11-07 RX ADMIN — CEFTRIAXONE SODIUM 2 G: 2 INJECTION, POWDER, FOR SOLUTION INTRAMUSCULAR; INTRAVENOUS at 16:48

## 2020-11-07 RX ADMIN — OXYCODONE HYDROCHLORIDE AND ACETAMINOPHEN 2 TABLET: 5; 325 TABLET ORAL at 11:08

## 2020-11-07 RX ADMIN — SODIUM CHLORIDE, POTASSIUM CHLORIDE, SODIUM LACTATE AND CALCIUM CHLORIDE: 600; 310; 30; 20 INJECTION, SOLUTION INTRAVENOUS at 16:48

## 2020-11-07 RX ADMIN — FLUCONAZOLE 400 MG: 200 TABLET ORAL at 05:03

## 2020-11-07 RX ADMIN — METRONIDAZOLE 500 MG: 500 TABLET ORAL at 05:03

## 2020-11-07 ASSESSMENT — PAIN DESCRIPTION - PAIN TYPE
TYPE: ACUTE PAIN

## 2020-11-07 ASSESSMENT — ENCOUNTER SYMPTOMS
MYALGIAS: 0
NAUSEA: 1
VOMITING: 0
SHORTNESS OF BREATH: 0
CONSTIPATION: 0
ABDOMINAL PAIN: 1
FEVER: 0
CHILLS: 0
DIARRHEA: 0
COUGH: 0
SPUTUM PRODUCTION: 0

## 2020-11-07 ASSESSMENT — FIBROSIS 4 INDEX: FIB4 SCORE: 1.61

## 2020-11-07 NOTE — PROGRESS NOTES
Infectious Disease Progress Note    Author: Dianne Fang M.D. Date & Time of service: 2020  12:07 PM    Chief Complaint:  Intrabdominal abscess and bacteremia    Interval History:    Review of Systems:  Review of Systems   Constitutional: Negative for chills and fever.   Respiratory: Negative for cough, sputum production and shortness of breath.    Gastrointestinal: Positive for abdominal pain and nausea. Negative for constipation, diarrhea and vomiting.   Musculoskeletal: Negative for myalgias.       Hemodynamics:  Temp (24hrs), Av.5 °C (97.7 °F), Min:36.3 °C (97.4 °F), Max:36.6 °C (97.9 °F)  Temperature: 36.4 °C (97.5 °F)  Pulse  Av.7  Min: 62  Max: 152   Blood Pressure: 110/74       Physical Exam:  Physical Exam  Constitutional:       Appearance: Normal appearance. He is obese.   Cardiovascular:      Rate and Rhythm: Normal rate and regular rhythm.      Heart sounds: Normal heart sounds.   Pulmonary:      Effort: Pulmonary effort is normal.      Breath sounds: Normal breath sounds.   Abdominal:      General: Abdomen is flat. There is no distension.      Palpations: Abdomen is soft.      Tenderness: There is abdominal tenderness.      Comments: LUQ ttp, drain in place with purulent fluid   Musculoskeletal:      Right lower leg: Edema present.      Left lower leg: Edema present.   Skin:     General: Skin is warm and dry.   Neurological:      General: No focal deficit present.      Mental Status: He is alert and oriented to person, place, and time.   Psychiatric:         Mood and Affect: Mood normal.         Behavior: Behavior normal.         Meds:    Current Facility-Administered Medications:   •  ondansetron  •  LR  •  fluconazole  •  potassium chloride SA  •  oxyCODONE-acetaminophen  •  furosemide  •  lisinopril  •  metoprolol SR  •  OLANZapine  •  sertraline  •  spironolactone  •  insulin regular **AND** POC Blood Glucose **AND** NOTIFY MD and PharmD **AND** glucose **AND** dextrose 50%  •   labetalol  •  metroNIDAZOLE  •  cefTRIAXone (ROCEPHIN) IV    Labs:  Recent Labs     11/05/20  0251 11/06/20  0121 11/07/20  0029   WBC 7.6 10.7 9.4   RBC 4.88 4.50* 5.02   HEMOGLOBIN 12.2* 11.4* 12.6*   HEMATOCRIT 40.8* 37.2* 43.1   MCV 83.6 82.7 85.9   MCH 25.0* 25.3* 25.1*   RDW 52.6* 51.6* 55.1*   PLATELETCT 331 306 245   MPV 9.8 9.9 10.5   NEUTSPOLYS 53.10 70.90 62.00   LYMPHOCYTES 31.10 15.70* 21.40*   MONOCYTES 11.40 10.30 13.70*   EOSINOPHILS 3.40 2.10 1.80   BASOPHILS 0.50 0.50 0.60   RBCMORPHOLO Present  --   --      Recent Labs     11/05/20  0251 11/06/20  0121 11/07/20  0315   SODIUM 135 132* 130*   POTASSIUM 4.0 4.5 4.8   CHLORIDE 101 101 98   CO2 26 22 21   GLUCOSE 129* 167* 146*   BUN 14 18 21     Recent Labs     11/05/20  0251 11/06/20  0121 11/07/20  0315   ALBUMIN 2.6* 2.3* 2.3*   TBILIRUBIN 0.8 0.6 0.7   ALKPHOSPHAT 108* 105* 93   TOTPROTEIN 6.7 6.2 6.6   ALTSGPT 20 14 11   ASTSGOT 33 23 23   CREATININE 0.84 0.86 1.00       Imaging:  Ct-abdomen-pelvis With    Addendum Date: 11/5/2020    Addendum: A prior noncontrast CT of the abdomen and pelvis from [facility] dated 10/19/2020 was made available for comparison. On the prior study a perisplenic/subcapsular fluid collection was seen however did not contain air at that time and there was no discrete evidence of communication with the transverse colon. Communication with splenic flexure of the colon and air within the collection are new from the prior study.    Result Date: 11/5/2020  11/3/2020 4:02 PM HISTORY/REASON FOR EXAM:  Abd pain, unspecified; IV contrast only. TECHNIQUE/EXAM DESCRIPTION:   CT scan of the abdomen and pelvis with contrast. Contrast-enhanced helical scanning was obtained from the diaphragmatic domes through the pubic symphysis following the bolus administration of nonionic contrast without complication. 100 mL of Omnipaque 350 nonionic contrast was administered without complication. Low dose optimization  technique was utilized for this CT exam including automated exposure control and adjustment of the mA and/or kV according to patient size. COMPARISON: 9/26/2018 FINDINGS: Chest Base: Small left pleural effusion with mild bibasilar atelectasis. Heart is mildly enlarged. Liver:  Diffuse hypoattenuation of the liver suggesting fatty infiltration. Moderately enlarged. Gallbladder: Cholelithiasis. Biliary tract: Nondilated. Pancreas: Normal. Spleen: There is a rim-enhancing perisplenic fluid collection which contains air and which communicates with the splenic flexure of the colon. This most likely represents an abscess and measures about 8.7 x 7.9 cm. The anterior aspect of the spleen is nonenhancing with irregular margins could be related to infection of splenic parenchyma or infarction. Adrenals: Normal. Kidneys and Collecting Systems:  There is mild right renal cortical scarring. No hydronephrosis. No renal mass. Gastrointestinal tract:  No bowel obstruction. The appendix is normal. Peritoneum: No free air or free fluid. Reproductive organs:  Normal. Bladder:  Normal. Vessels:  Normal caliber. Lymph  Nodes:  No lymphadenopathy. Abdominal wall: Within normal limits. Bones:  Age indeterminate possibly subacute to chronic T11 and T12 superior endplate compression fractures.     1.  Peripherally enhancing perisplenic fluid collection which communicates with the splenic flexure of the colon and which contains foci of air is most consistent with an abscess/infected fluid collection. There is nonenhancement of the anterior aspect of the spleen suggesting infection/infarction. Surgical consultation is recommended. 2.  Hepatomegaly and hepatic steatosis. 3.  Cholelithiasis. 4.  Right renal cortical scarring. No hydronephrosis. 5.  Small left pleural effusion. Mild bibasilar atelectasis. 6.  Subacute to chronic appearing T11 and T12 superior endplate compression fractures. These findings were discussed with SVETLANA PAREDES  on 11/3/2020 4:49 PM.     Ct-drain-peritoneal    Result Date: 11/4/2020 11/4/2020 5:19 PM HISTORY/REASON FOR EXAM:  Joya Splenic Fluid Collection TECHNIQUE/EXAM DESCRIPTION: LUQ parasplenic abscess drainage with CT guidance. Low dose optimization technique was utilized for this CT exam including automated exposure control and adjustment of the mA and/or kV according to patient size. PROCEDURE: Informed consent was obtained. SEDATION: Moderate sedation was provided. Pulse oximetry and continuous cardiac monitoring by the nurse was performed throughout the exam. Intraservice time was 30 minutes. Localizing CT images were obtained with the patient in supine position. The skin was prepped with Betadine and draped in a sterile fashion. Following local anesthesia with 1% Lidocaine, a 17 G guiding needle was placed and needle path confirmed with CT. An Amplatz guidewire was placed and following serial tract dilatation, a 10 Slovak pigtail locking catheter was placed. A specimen was collected and submitted for culture and sensitivity and Gram stain. Total of 6 mL of Brownish fluid was drained. The catheter was secured to the skin and connected to suction bulb drainage. Final CT images were obtained documenting catheter position. The patient tolerated the procedure well with no evidence of complication. COMPARISON: CT scan abdomen and pelvis 11/3/2020 FINDINGS: The final CT images show satisfactory catheter position within the target collection.     1.  CT guided perisplenic abscess catheter drainage. 2.  The current plan is to obtain a follow-up CT in 5-7 days..    Dx-chest-portable (1 View)    Result Date: 11/3/2020  11/3/2020 3:41 PM HISTORY/REASON FOR EXAM:  Chest Pain. TECHNIQUE/EXAM DESCRIPTION AND NUMBER OF VIEWS: Single portable view of the chest. COMPARISON: September 10, 2020 FINDINGS: The cardiac size is enlarged. There is interstitial prominence. Lung aeration has improved. No pneumothorax. No obvious pleural  effusion.     Slight interval interval improvement in congestive heart failure.      Micro:  Results     Procedure Component Value Units Date/Time    Anaerobic Culture [[id number]] Collected: 11/04/20 1730    Order Status: Completed Specimen: Wound Updated: 11/07/20 1114     Significant Indicator NEG     Source WND     Site Peritoneal Drain     Culture Result No Anaerobes isolated.    Narrative:      Special Contact [id number] MAXINE Stevenson fluid from peritoneal drain  Special Contact [id number] MAXINE ELLIS    Aerobic/Anaerobic Culture (Surgery) [[id number]] Collected: 11/04/20 1730    Order Status: Completed Specimen: Wound from Peritoneal Fluid Updated: 11/07/20 1114     Significant Indicator NEG     Source WND     Site Peritoneal Drain     Culture Result Order placed in Surgery. Source appropriate culture orders  have been placed in Micro for testing.      Narrative:      Special Contact [id number] MAXINE Stevenson fluid from peritoneal drain  Special Contact [id number] MAXINE ELLIS    CULTURE WOUND W/ GRAM STAIN [[id number]]  (Abnormal)  (Susceptibility) Collected: 11/04/20 1730    Order Status: Completed Specimen: Wound Updated: 11/07/20 1114     Significant Indicator POS     Source WND     Site Peritoneal Drain     Culture Result -     Gram Stain Result Rare WBCs.  Few Gram positive cocci.  Few Yeast.       Culture Result Enterococcus faecalis  Heavy growth        Yeast  Moderate growth      Narrative:      Special Contact [id number] MAXINE Stevenson fluid from peritoneal drain  Special Contact [id number] MAXINE ELLIS    Susceptibility     Enterococcus faecalis (1)     Antibiotic Interpretation Microscan Method Status    Daptomycin Sensitive 4 mcg/mL ISAMAR Preliminary    Ampicillin Sensitive <=2 mcg/mL ISAMAR Preliminary    Gent Synergy Sensitive <=500 mcg/mL ISAMAR Preliminary    Vancomycin Sensitive 1 mcg/mL ISAMAR Preliminary    Penicillin Sensitive 2 mcg/mL  "ISAMAR Preliminary                   URINALYSIS [[id number]]  (Abnormal) Collected: 11/06/20 1235    Order Status: Completed Specimen: Urine, Clean Catch Updated: 11/06/20 1327     Color DK Yellow     Character Clear     Specific Gravity 1.019     Ph 5.0     Glucose Negative mg/dL      Ketones Negative mg/dL      Protein Negative mg/dL      Bilirubin Negative     Urobilinogen, Urine 0.2     Nitrite Negative     Leukocyte Esterase Trace     Occult Blood Negative     Micro Urine Req Microscopic    Narrative:      Collected By:[id number] JF MURDOCK    BLOOD CULTURE [[id number]]  (Abnormal) Collected: 11/03/20 1528    Order Status: Completed Specimen: Blood from Peripheral Updated: 11/06/20 1141     Significant Indicator POS     Source BLD     Site PERIPHERAL     Culture Result Growth detected by Bactec instrument.  11/04/2020  12:52      Viridans Streptococcus  Possible Contaminant  Isolated from one bottle only, correlate with clinical  condition.      Narrative:      CALL  [place]  183 tel. [phone],  CALLED  183 tel. [phone] 11/04/2020, 12:59, RB PERF. RESULTS CALLED TO:  [id number], RN  Per Hospital Policy: Only change Specimen Src: to \"Line\" if  specified by physician order.  No site indicated    BLOOD CULTURE [[id number]] Collected: 11/05/20 1950    Order Status: Completed Specimen: Blood from Peripheral Updated: 11/06/20 1025     Significant Indicator NEG     Source BLD     Site PERIPHERAL     Culture Result No Growth  Note: Blood cultures are incubated for 5 days and  are monitored continuously.Positive blood cultures  are called to the RN and reported as soon as  they are identified.      Narrative:      Per Hospital Policy: Only change Specimen Src: to \"Line\" if  specified by physician order.  Right AC    BLOOD CULTURE [[id number]] Collected: 11/05/20 1950    Order Status: Completed Specimen: Blood from Peripheral Updated: 11/06/20 1025     Significant Indicator NEG     Source BLD     Site PERIPHERAL     Culture Result " "No Growth  Note: Blood cultures are incubated for 5 days and  are monitored continuously.Positive blood cultures  are called to the RN and reported as soon as  they are identified.      Narrative:      Per Hospital Policy: Only change Specimen Src: to \"Line\" if  specified by physician order.  Right Hand    URINE CULTURE(NEW) [[id number]] Collected: 11/03/20 1707    Order Status: Completed Specimen: Urine Updated: 11/06/20 0749     Significant Indicator NEG     Source UR     Site -     Culture Result Mixed skin joselin <10,000 cfu/mL    Narrative:      Indication for culture:->Emergency Room Patient  Indication for culture:->Emergency Room Patient    CULTURE STOOL [[id number]] Collected: 11/04/20 1609    Order Status: Completed Specimen: Stool Updated: 11/05/20 1600     Significant Indicator NEG     Source STL     Site STOOL     Culture Result Shiga Toxin testing not performed due to lack of growth in  [place] broth.    NOTE:    Stool cultures are screened for Shiga Toxins 1 and 2,    Salmonella, Shigella, Campylobacter, Aeromonas,    Plesiomonas.      Narrative:      Special Contact [id number] EMIL FRANCHESKA  Does this patient have risk factors for C-diff?->Yes  Special Contact [id number] EMIL FRANCHESKA    GRAM STAIN [[id number]] Collected: 11/04/20 1730    Order Status: Completed Specimen: Wound Updated: 11/05/20 0137     Significant Indicator .     Source WND     Site Peritoneal Drain     Gram Stain Result Rare WBCs.  Few Gram positive cocci.  Few Yeast.      Narrative:      Special Contact [id number] MAXINE ELLIS  Brown fluid from peritoneal drain  Special Contact [id number] MAXINE ELLIS    C Diff by PCR rflx Toxin [[id number]] Collected: 11/04/20 1609    Order Status: Completed Specimen: Stool Updated: 11/04/20 2202     C Diff by PCR Negative     Comment: C. difficile NOT detected by PCR.  Treatment not indicated per guidelines.  Repeat testing not indicated within 7 days.         " " 027-NAP1-BI Presumptive Negative     Comment: Presumptive 027/NAP1/BI target DNA sequences are NOT DETECTED.       Narrative:      Special Contact [id number] EMIL PRITCHARD  Does this patient have risk factors for C-diff?->Yes    FLUID CULTURE W/GRAM STAIN [[id number]] Collected: 11/04/20 1730    Order Status: Canceled Specimen: Other from Peritoneal Fluid     BLOOD CULTURE [[id number]] Collected: 11/03/20 1626    Order Status: Completed Specimen: Blood from Peripheral Updated: 11/04/20 0735     Significant Indicator NEG     Source BLD     Site PERIPHERAL     Culture Result No Growth  Note: Blood cultures are incubated for 5 days and  are monitored continuously.Positive blood cultures  are called to the RN and reported as soon as  they are identified.      Narrative:      Per Hospital Policy: Only change Specimen Src: to \"Line\" if  specified by physician order.  No site indicated    URINALYSIS [[id number]]  (Abnormal) Collected: 11/03/20 1707    Order Status: Completed Specimen: Urine Updated: 11/03/20 1745     Color DK Yellow     Character Cloudy     Specific Gravity 1.026     Ph 5.0     Glucose Negative mg/dL      Ketones Trace mg/dL      Protein 30 mg/dL      Bilirubin Small     Urobilinogen, Urine 1.0     Nitrite Negative     Leukocyte Esterase Trace     Occult Blood Negative     Micro Urine Req Microscopic    Narrative:      Indication for culture:->Emergency Room Patient    Routine (COVID/SARS COV-2 In-House PCR up to 24 hours) [[id number]]     Order Status: Completed Specimen: Respirate from Nasopharyngeal         Interval 24 hours:      AF, O2 RA  Labs reviewed  Micro reviewed  Drain output 30 cc so far    Patient is reporting some ongoing nausea, no vomiting and is still quite tender in the left upper quadrant.  Drain in place continues to have output.  Will change antibiotics based on new culture results as below.      ASSESSMENT/PLAN:      55 y.o.  admitted 11/3/2020. Pt has a past medical history " of A. fib, CHD, diabetes and severe morbid obesity.  He has been seen by ID in the past for right foot abscess with cultures positive for MRSA and Proteus.  Had a ground-level fall approximately 1 month ago and was admitted to [facility] with left upper quadrant pain.  CT at the time revealed a subcapsular splenic hematoma. Presented to ED complaining of abdominal pain described as 4/5 out of 10 dull and constant located in the left upper quadrant.     Hospital Course:   CT scan on 11/3 revealed a peripherally enhancing perisplenic fluid collection measuring 8.7 x 7.9 cm, communicating with the splenic flexure of the colon and contained foci of air consistent with abscess/infected fluid.  Also noted nonenhancing of the anterior aspect of the spleen suggesting infection/infarction.  Surgery was consulted and recommended IR drainage of the fluid collection.  Today he reports that his abdominal pain has improved since drainage.     Problem List   Bacteremia  -Blood cultures on 11/31/2 + viridans streptococci, possible contaminant  -Blood culture on 11/5 with no growth to date  Perisplenic abscess with possible communication to the bowel  Splenic enhancement on CT, suggesting infection or infarction  -Drainage on 11/4 and peritoneal fluid +GPC-Enterococcus faecalis, ampicillin sensitive and  yeast  Recent ground-level fall located by a subcapsular splenic hematoma  Diabetes  A. fib  Morbid obesity  Diarrhea  -C. difficile tested negative on 11/4  Antibiotic allergies: Daptomycin rash, penicillins rash and hives, tolerates cephalosporins-discussed allergies and he states that he has developed significant rash and hives with penicillins several times, most recently a year and a half ago- will avoid penicillins     Plan   ---  Given that the abscess has potential communication with bowel prefer broad spectrum coverage.  Will continue ceftriaxone and Flagyl to provide gram-negative and anaerobic coverage, will add po  linezolid 600 mg for the Enterococcus faecalis and continue fluconazole 40 mg daily to treat the yeast while awaiting susceptibilities-patient with penicillin allergy as described above limiting antibiotic choices-noted that the patient is on sertraline.  Risk of serotonin syndrome is low as this is exceptionally rare.  However, monitor for any change in vitals such as new fevers or agitation.  If these occur will stop linezolid and transition to vancomycin.  --- Follow drain output and repeat CT a/p in 5-7 days as drain output has slowed   --- Follow-up drain cultures to final   --- Monitor labs  --- Monitor and control blood sugars      Will continue to follow

## 2020-11-07 NOTE — PROGRESS NOTES
Received report from Ruby PÉREZ, at pt bedside. , POC discussed. Call light and belongings within reach. Bed locked and in low position. Alarm on and fall precautions in place.

## 2020-11-07 NOTE — PROGRESS NOTES
[facility] MEDICINE PROGRESS NOTE     PATIENT: Sebastián Castro  MRN: [id number]  : 1965    Attending: Nickie Rowe MD  Re-Entry Attending: Antony Freed MD  Senior Resident: Vilma Horvath MD    Overnight Events:  Telemetry remains rapid Atrial Fibrillation 110-138, slightly faster than yesterday but no specific events.  Drained smaller amount of bilious fluid from L abdomen BETTE drain than yesterday.  Lighter color than yesterday.       Subjective: Despite tolerating solid food previously, he was still too nauseated to eat, but had not apparently requested the Zofran ordered yesterday to help with meals.  He reports slightly decreasing abdominal pain today, that is still reasonably managed with his pain regimen.    Mild dysuria with urination.  His diarrhea is slowing but not stopped, with pain most severe when he gets up to the bathroom.  Diuresis seems effective and swelling seems controlled.     OBJECTIVE:  Temp:  [36.3 °C (97.4 °F)-37.2 °C (98.9 °F)] 36.3 °C (97.4 °F)  Pulse:  [] 95  Resp:  [18-20] 18  BP: (101-122)/(63-79) 101/72  SpO2:  [90 %-92 %] 90 %    Intake/Output Summary (Last 24 hours) at 2020 0608  Last data filed at 2020 1758  Gross per 24 hour   Intake 1920 ml   Output 380 ml   Net 1540 ml       PE:  General: Pt resting in mild discomfort, same pain level as yesterday, cooperative, morbidly obese  Skin:  Pink, warm and dry.  No rashes  HEENT: NC/AT. EOMI. MMM. No nasal discharge. Oropharynx nonerythematous without exudate/plaques  Neck:  Supple without lymphadenopathy or rigidity. No JVD   Lungs:  Symmetrical.  CTAB with no W/R/R.  Good air movement.  Not on any supplemental oxygen.  Cardiovascular:  S1/S2 tachycardic and irregular without M/R/G.  Abdomen:  Abdomen is soft ND.  Mild tenderness to palpation diffusely across abdomen, nilda RUQ, drain in place with scant brown fluid .  No guarding or rebound.  +BS. No masses noted.  Genitourinary:  Deferred.    Extremities:  Full  range of motion. No gross deformities noted. 2+ pulses in all extremities. No cyanosis. 1+ bilateral pedal edema  CNS:  Muscle tone is normal. Cranial nerves II-XII grossly intact. 2+ DTRs. Affect and judgment normal.   Ambulatory to bathroom      LABS:  Recent Labs     11/05/20 0251 11/06/20 0121 11/07/20  0029   WBC 7.6 10.7 9.4   RBC 4.88 4.50* 5.02   HEMOGLOBIN 12.2* 11.4* 12.6*   HEMATOCRIT 40.8* 37.2* 43.1   MCV 83.6 82.7 85.9   MCH 25.0* 25.3* 25.1*   RDW 52.6* 51.6* 55.1*   PLATELETCT 331 306 245   MPV 9.8 9.9 10.5   NEUTSPOLYS 53.10 70.90 62.00   LYMPHOCYTES 31.10 15.70* 21.40*   MONOCYTES 11.40 10.30 13.70*   EOSINOPHILS 3.40 2.10 1.80   BASOPHILS 0.50 0.50 0.60   RBCMORPHOLO Present  --   --      Recent Labs     11/05/20 0251 11/06/20 0121 11/07/20 0315   SODIUM 135 132* 130*   POTASSIUM 4.0 4.5 4.8   CHLORIDE 101 101 98   CO2 26 22 21   BUN 14 18 21   CREATININE 0.84 0.86 1.00   CALCIUM 8.6 8.3* 8.6   ALBUMIN 2.6* 2.3* 2.3*     Estimated GFR/CRCL = Estimated Creatinine Clearance: 125.5 mL/min (by C-G formula based on SCr of 1 mg/dL).  Recent Labs     11/05/20 0251 11/05/20 0251 11/06/20 0121 11/06/20 0121 11/06/20  1107 11/06/20  1647 11/06/20  2353 11/07/20 0315   GLUCOSE 129*  --  167*  --   --   --   --  146*   POCGLUCOSE  --    < >  --    < > 157* 147* 140*  --     < > = values in this interval not displayed.     Recent Labs     11/04/20  1157 11/05/20 0251 11/06/20 0121 11/07/20 0315   ASTSGOT  --  33 23 23   ALTSGPT  --  20 14 11   TBILIRUBIN  --  0.8 0.6 0.7   ALKPHOSPHAT  --  108* 105* 93   GLOBULIN  --  4.1* 3.9* 4.3*   INR 1.27*  --   --   --              Recent Labs     11/04/20  1157   INR 1.27*       MICROBIOLOGY:   Blood Culture   Date Value Ref Range Status   03/15/2018 No growth after 5 days of incubation.  Final        IMAGING:   CT-DRAIN-PERITONEAL   Final Result      1.  CT guided perisplenic abscess catheter drainage.   2.  The current plan is to obtain a follow-up CT in  5-7 days..      OUTSIDE IMAGES-CT CHEST   Final Result      OUTSIDE IMAGES-CT ABDOMEN /PELVIS   Final Result      CT-ABDOMEN-PELVIS WITH   Final Result   Addendum 1 of 1   Addendum:   A prior noncontrast CT of the abdomen and pelvis from [facility] dated 10/19/2020 was made available for comparison. On the    prior study a perisplenic/subcapsular fluid collection was seen however    did not contain air at that time and    there was no discrete evidence of communication with the transverse colon.    Communication with splenic flexure of the colon and air within the    collection are new from the prior study.      Final      DX-CHEST-PORTABLE (1 VIEW)   Final Result      Slight interval interval improvement in congestive heart failure.          MEDS:  Current Facility-Administered Medications   Medication Last Admin   • ondansetron (ZOFRAN ODT) dispertab 4 mg 4 mg at 11/06/20 1716   • lactated ringers infusion New Bag at 11/07/20 0222   • fluconazole (DIFLUCAN) tablet 400 mg 400 mg at 11/07/20 0503   • potassium chloride SA (Kdur) tablet 40 mEq 40 mEq at 11/07/20 0503   • oxyCODONE-acetaminophen (PERCOCET) 5-325 MG per tablet 1-2 Tab 1 Tab at 11/06/20 2355   • furosemide (LASIX) tablet 40 mg Stopped at 11/07/20 0600   • lisinopril (PRINIVIL) tablet 10 mg Stopped at 11/07/20 0600   • metoprolol SR (TOPROL XL) tablet 50 mg Stopped at 11/07/20 0600   • OLANZapine (ZYPREXA) tablet 7.5 mg 7.5 mg at 11/06/20 2058   • sertraline (Zoloft) tablet 125 mg 125 mg at 11/07/20 0503   • spironolactone (ALDACTONE) tablet 25 mg Stopped at 11/07/20 0600   • insulin regular (HumuLIN R,NovoLIN R) injection Stopped at 11/06/20 1800    And   • glucose 4 g chewable tablet 16 g      And   • dextrose 50% (D50W) injection 50 mL     • labetalol (NORMODYNE/TRANDATE) injection 10 mg     • metroNIDAZOLE (FLAGYL) tablet 500 mg 500 mg at 11/07/20 0503   • cefTRIAXone (ROCEPHIN) 2 g in  mL IVPB Stopped at 11/06/20 1742           ASSESSMENT/PLAN: 55 y.o. male with PMH of morbid obesity, DM 2, CHF admitted for perisplenic abscess with communication to the left colon.  Gradually diminishing drainage from abscess region.  Persistent nausea and abdominal pain, decreasing diarrhea frequency.  Persistent A-fib with rapid response, more rapid today than past 2 days.     # Perisplenic abscess  # Left upper quadrant abdominal pain  # Nausea, vomiting  # Probable subcapsular splenic hematoma  # Gram Positive cocci bacteremia with strep   General surgery following and have not changed anticoagulation or drain plan at this point.  - Continue metronidazole and ceftriaxone with fluconazole added by Infectious disease     # Diarrhea  Recent abx use, recent hospitalization.  C. diff negative and diarrhea diminishing somewhat.  Continue to monitor.     # Atrial fibrillation with rapid ventricular response  Was not on anticoagulation at home, and he does not know why.  Does not appear he has been following up with cardiology regularly, though he has seen a cardiologist. Consider anticoagulation after surgery removes drain.     # Elevated Troponin T  # Elevated BNP  # History of prolonged QTc interval  # Heart failure with reduced ejection fraction  Troponin elevated to his baseline, patient reports compliance with home metoprolol. Did receive 5 mg of IV metoprolol in the ER with heart rate remaining in the 130s.  Last echocardiogram on file from September 2020 with LVEF of 30.  - Continuous monitoring   - Considering increasing beta blockade but will reassess tomorrow  - Daily weights, and measurement of ins and outs  - As above, fluids to be administered carefully to avoid fluid overload  - Continue lasix 40 mg daily  - Continue lisinopril 10 mg   - Continue spironolactone 25 mg daily  - Increase metoprolol SR to 100 mg daily       # Hyponatremia, resolved   Hyponatremic to 128 in ED. Again at 135 this am holding steady  - CTM      # Hypokalemia,  resolved  Improved to 4.8 today  - CTM      # Elevated lipase  No evidence of pancreatitis on CT imaging.  Consider repeat due to increased abdominal pain.     # Microcytic anemia, mild  Likely iron deficient anemia versus anemia of chronic disease.  Stable.  -To follow on outpatient basis     # Diabetes mellitus type 2  Patient states blood sugar well controlled at home with metformin. Last a1c of 7.7 on 10/22/19  -Sliding scale insulin with hypoglycemia protocol     # Dysuria  -UA positive leukocyte esterase, negative nitrites.  Already on antibiotic and changing per infectious disease.       #Core Measures   VTE PPx: SCDs pending IR drainage   Abx: Ceftriaxone, Fluconazole and Metronidazole   Lines/Tubes: PIV & Left abdominal BETTE Drain  Fluids: LR at 100 ml/hr  Diet: Diabetic - mostly NPO for nausea past 24 hrs  Code Status: Full      Antony Freed MD   [facility]   [phone]

## 2020-11-07 NOTE — CARE PLAN
Problem: Safety  Goal: Will remain free from injury  Outcome: PROGRESSING AS EXPECTED  Note: Call-light within reach.  socks in use. Appropriate signage in room and outside near door. Education regarding safety and fall prevention discussed with patient. Bed is locked and in lowest position. Room is free from clutter and appropriate lighting is present. Alarms in use and staff are present when patient is up out of bed or chair.        Problem: Infection  Goal: Will remain free from infection  Outcome: PROGRESSING AS EXPECTED     Problem: Bowel/Gastric:  Goal: Normal bowel function is maintained or improved  Outcome: PROGRESSING AS EXPECTED     Problem: Safety  Goal: Will remain free from falls  Note: Call-light within reach.  socks in use. Appropriate signage in room and outside near door. Education regarding safety and fall prevention discussed with patient. Bed is locked and in lowest position. Room is free from clutter and appropriate lighting is present. Alarms in use and staff are present when patient is up out of bed or chair.

## 2020-11-07 NOTE — NON-PROVIDER
[facility] MEDICINE PROGRESS NOTE     Attending:   Dr. Nickie Rowe    Resident:   Jason Reyes M.D.    PATIENT:   Sebastián Castro; [id number]; 1965    ID:   55 y.o. male recently discharged 10/31 from [facility] for splenic hematoma admitted for perisplenic abscess, a-fib with RVR    SUBJECTIVE:   No acute events overnight, but HR ranges from 90s-130s.  reports nausea and vomiting with trying clear liquid breakfast this am, unable to tolerate. States LUQ pain is controlled at 5/10 currently. Denies CP, SOB, palpitations, fever or chills.    OBJECTIVE:  Vitals:    11/06/20 1559 11/06/20 2015 11/06/20 2350 11/07/20 0412   BP: 109/63 105/69 103/65 101/72   Pulse: (!) 124 (!) 106 (!) 112 95   Resp: 18 18 18 18   Temp: 36.6 °C (97.9 °F) 36.5 °C (97.7 °F) 36.6 °C (97.9 °F) 36.3 °C (97.4 °F)   TempSrc: Temporal Temporal Temporal Temporal   SpO2: 92% 91% 90% 90%   Weight:  (!) 155.9 kg (343 lb 11.2 oz)     Height:           Intake/Output Summary (Last 24 hours) at 11/7/2020 0850  Last data filed at 11/6/2020 1758  Gross per 24 hour   Intake 1920 ml   Output 30 ml   Net 1890 ml       PHYSICAL EXAM:  General: No acute distress, afebrile, resting comfortably  HEENT: NC/AT. EOMI. MMM, neck supple without adenopathy  Cardiovascular: +Irregularly irregular rhythm, tachycardic, normal S1/S2 no murmurs rubs, or gallops, cap refill brisk.  Respiratory: CTAB, no tachypnea or retractions  Abdomen: soft,obese,+tender to mild palpation, normoactive BS. BETTE drain present on left side. No guarding/rebound.  EXT:  +bruising on upper extremities. 1+ LE edema. No rashes or skin changes noted. Pulses 2+ DP and radial bilaterally  Neuro: Non-focal    LABS:  Recent Labs     11/05/20  0251 11/06/20  0121 11/07/20  0029   WBC 7.6 10.7 9.4   RBC 4.88 4.50* 5.02   HEMOGLOBIN 12.2* 11.4* 12.6*   HEMATOCRIT 40.8* 37.2* 43.1   MCV 83.6 82.7 85.9   MCH 25.0* 25.3* 25.1*   RDW 52.6* 51.6* 55.1*   PLATELETCT 331 306 245   MPV 9.8 9.9 10.5   NEUTSPOLYS 53.10  70.90 62.00   LYMPHOCYTES 31.10 15.70* 21.40*   MONOCYTES 11.40 10.30 13.70*   EOSINOPHILS 3.40 2.10 1.80   BASOPHILS 0.50 0.50 0.60   RBCMORPHOLO Present  --   --      Recent Labs     11/05/20  0251 11/06/20 0121 11/07/20  0315   SODIUM 135 132* 130*   POTASSIUM 4.0 4.5 4.8   CHLORIDE 101 101 98   CO2 26 22 21   BUN 14 18 21   CREATININE 0.84 0.86 1.00   CALCIUM 8.6 8.3* 8.6   ALBUMIN 2.6* 2.3* 2.3*     Estimated GFR/CRCL = Estimated Creatinine Clearance: 125.5 mL/min (by C-G formula based on SCr of 1 mg/dL).  Recent Labs     11/05/20 0251 11/05/20 0251 11/06/20 0121 11/06/20  0121 11/06/20  1647 11/06/20  2353 11/07/20 0315 11/07/20  0609   GLUCOSE 129*  --  167*  --   --   --  146*  --    POCGLUCOSE  --    < >  --    < > 147* 140*  --  134*    < > = values in this interval not displayed.     Recent Labs     11/04/20  1157 11/05/20 0251 11/06/20 0121 11/07/20  0315   ASTSGOT  --  33 23 23   ALTSGPT  --  20 14 11   TBILIRUBIN  --  0.8 0.6 0.7   ALKPHOSPHAT  --  108* 105* 93   GLOBULIN  --  4.1* 3.9* 4.3*   INR 1.27*  --   --   --              Recent Labs     11/04/20  1157   INR 1.27*         IMAGING:  CT-ABDOMEN-PELVIS WITH  Addendum: Addendum:   A prior noncontrast CT of the abdomen and pelvis from [facility] dated 10/19/2020 was made available for comparison. On the prior study a perisplenic/subcapsular fluid collection was seen however  did not contain air at that time and there was no discrete evidence of communication with the transverse colon.  Communication with splenic flexure of the colon and air within the collection are new from the prior study.  Narrative: 11/3/2020 4:02 PM    Impression:   1.  Peripherally enhancing perisplenic fluid collection which communicates with the splenic flexure of the colon and which contains foci of air is most consistent with an abscess/infected fluid collection. There is nonenhancement of the anterior aspect of the spleen suggesting  infection/infarction. Surgical consultation is recommended.  2.  Hepatomegaly and hepatic steatosis.  3.  Cholelithiasis.  4.  Right renal cortical scarring. No hydronephrosis.  5.  Small left pleural effusion. Mild bibasilar atelectasis.  6.  Subacute to chronic appearing T11 and T12 superior endplate compression fractures.      MEDS:  Current Facility-Administered Medications   Medication Last Admin   • ondansetron (ZOFRAN ODT) dispertab 4 mg 4 mg at 11/06/20 1716   • lactated ringers infusion New Bag at 11/07/20 0222   • fluconazole (DIFLUCAN) tablet 400 mg 400 mg at 11/07/20 0503   • potassium chloride SA (Kdur) tablet 40 mEq 40 mEq at 11/07/20 0503   • oxyCODONE-acetaminophen (PERCOCET) 5-325 MG per tablet 1-2 Tab 1 Tab at 11/06/20 2355   • furosemide (LASIX) tablet 40 mg Stopped at 11/07/20 0600   • lisinopril (PRINIVIL) tablet 10 mg Stopped at 11/07/20 0600   • metoprolol SR (TOPROL XL) tablet 50 mg Stopped at 11/07/20 0600   • OLANZapine (ZYPREXA) tablet 7.5 mg 7.5 mg at 11/06/20 2058   • sertraline (Zoloft) tablet 125 mg 125 mg at 11/07/20 0503   • spironolactone (ALDACTONE) tablet 25 mg Stopped at 11/07/20 0600   • insulin regular (HumuLIN R,NovoLIN R) injection Stopped at 11/06/20 1800    And   • glucose 4 g chewable tablet 16 g      And   • dextrose 50% (D50W) injection 50 mL     • labetalol (NORMODYNE/TRANDATE) injection 10 mg     • metroNIDAZOLE (FLAGYL) tablet 500 mg 500 mg at 11/07/20 0503   • cefTRIAXone (ROCEPHIN) 2 g in  mL IVPB Stopped at 11/06/20 1747       ASSESSMENT/PLAN:    #Perisplenic abscess/LUQ pain/N/V/Subcapsular splenic hematoma  - 11/4 CT-guided BETTE drain placed, draining brown fluid.   -General surgery following  - Continue C3, metronidazole, fluconazole  -N/V this am with clear liquid intake. Pre-medicate with odansetron before oral intake to help tolerance.      #A-fib w/ RVR  HR  overnight, BP 100s-110s. Consider changing metoprolol XL dose to 75-100mg daily for better  rate control. Diltiazem contraindicated due to EF 30%.  -Pt denies ever taking anticoagulation, has not followed up with cardiologist outpatient. Hold anticoagulation until cleared by general surgery.     #Hyponatremia  -Na 130 this am, continue to monitor.     #+Troponin, elevated BNP, CHF  -Troponins elevated but at patient's baseline upon admission.  Echo earlier this year shows EF 30%. BNP elevated, continue BID furosemide for diuresis. Strict I/Os, low Na diet.  -Troponins trending down without EKG changes or chest pain reported.    #DM  -Controlled on inpatient sliding scale. Resume home metformin once no further contrast imaging desire.    #Diarrhea  - reducing in frequency. C-diff stool toxin negative, + recent antibiotic use inpatient.       Dispo: Once cleared by general surgery, discharge back to home with PO abx and cardiology followup.

## 2020-11-08 ENCOUNTER — APPOINTMENT (OUTPATIENT)
Dept: RADIOLOGY | Facility: MEDICAL CENTER | Age: 55
DRG: 329 | End: 2020-11-08
Attending: STUDENT IN AN ORGANIZED HEALTH CARE EDUCATION/TRAINING PROGRAM
Payer: MEDICAID

## 2020-11-08 PROBLEM — R78.81 BACTEREMIA: Status: ACTIVE | Noted: 2020-11-08

## 2020-11-08 PROBLEM — I42.8 NICM (NONISCHEMIC CARDIOMYOPATHY) (HCC): Status: ACTIVE | Noted: 2020-11-08

## 2020-11-08 PROBLEM — L02.91 ABSCESS: Status: ACTIVE | Noted: 2020-11-08

## 2020-11-08 PROBLEM — I51.9 LV DYSFUNCTION: Status: ACTIVE | Noted: 2020-11-08

## 2020-11-08 PROBLEM — A41.9 SEPSIS (HCC): Status: ACTIVE | Noted: 2020-11-08

## 2020-11-08 PROBLEM — I48.11 LONGSTANDING PERSISTENT ATRIAL FIBRILLATION (HCC): Status: ACTIVE | Noted: 2020-11-08

## 2020-11-08 LAB
BACTERIA BLD CULT: NORMAL
BACTERIA WND AEROBE CULT: ABNORMAL
EKG IMPRESSION: NORMAL
GLUCOSE BLD-MCNC: 108 MG/DL (ref 65–99)
GLUCOSE BLD-MCNC: 123 MG/DL (ref 65–99)
GLUCOSE BLD-MCNC: 127 MG/DL (ref 65–99)
GRAM STN SPEC: ABNORMAL
SIGNIFICANT IND 70042: ABNORMAL
SIGNIFICANT IND 70042: NORMAL
SITE SITE: ABNORMAL
SITE SITE: NORMAL
SOURCE SOURCE: ABNORMAL
SOURCE SOURCE: NORMAL

## 2020-11-08 PROCEDURE — A9270 NON-COVERED ITEM OR SERVICE: HCPCS | Performed by: INTERNAL MEDICINE

## 2020-11-08 PROCEDURE — 700102 HCHG RX REV CODE 250 W/ 637 OVERRIDE(OP): Performed by: STUDENT IN AN ORGANIZED HEALTH CARE EDUCATION/TRAINING PROGRAM

## 2020-11-08 PROCEDURE — 700117 HCHG RX CONTRAST REV CODE 255: Performed by: STUDENT IN AN ORGANIZED HEALTH CARE EDUCATION/TRAINING PROGRAM

## 2020-11-08 PROCEDURE — A9270 NON-COVERED ITEM OR SERVICE: HCPCS | Performed by: STUDENT IN AN ORGANIZED HEALTH CARE EDUCATION/TRAINING PROGRAM

## 2020-11-08 PROCEDURE — 93005 ELECTROCARDIOGRAM TRACING: CPT | Performed by: INTERNAL MEDICINE

## 2020-11-08 PROCEDURE — 700102 HCHG RX REV CODE 250 W/ 637 OVERRIDE(OP): Performed by: INTERNAL MEDICINE

## 2020-11-08 PROCEDURE — 700105 HCHG RX REV CODE 258: Performed by: STUDENT IN AN ORGANIZED HEALTH CARE EDUCATION/TRAINING PROGRAM

## 2020-11-08 PROCEDURE — 700111 HCHG RX REV CODE 636 W/ 250 OVERRIDE (IP): Performed by: FAMILY MEDICINE

## 2020-11-08 PROCEDURE — 82962 GLUCOSE BLOOD TEST: CPT

## 2020-11-08 PROCEDURE — 99254 IP/OBS CNSLTJ NEW/EST MOD 60: CPT | Performed by: INTERNAL MEDICINE

## 2020-11-08 PROCEDURE — 93010 ELECTROCARDIOGRAM REPORT: CPT | Performed by: INTERNAL MEDICINE

## 2020-11-08 PROCEDURE — 700111 HCHG RX REV CODE 636 W/ 250 OVERRIDE (IP): Performed by: STUDENT IN AN ORGANIZED HEALTH CARE EDUCATION/TRAINING PROGRAM

## 2020-11-08 PROCEDURE — 74177 CT ABD & PELVIS W/CONTRAST: CPT

## 2020-11-08 PROCEDURE — 770020 HCHG ROOM/CARE - TELE (206)

## 2020-11-08 RX ORDER — METOPROLOL SUCCINATE 50 MG/1
50 TABLET, EXTENDED RELEASE ORAL DAILY
Status: DISCONTINUED | OUTPATIENT
Start: 2020-11-09 | End: 2020-11-11

## 2020-11-08 RX ORDER — METOPROLOL SUCCINATE 50 MG/1
100 TABLET, EXTENDED RELEASE ORAL DAILY
Status: DISCONTINUED | OUTPATIENT
Start: 2020-11-09 | End: 2020-11-08

## 2020-11-08 RX ORDER — ONDANSETRON 2 MG/ML
4 INJECTION INTRAMUSCULAR; INTRAVENOUS EVERY 4 HOURS PRN
Status: DISCONTINUED | OUTPATIENT
Start: 2020-11-08 | End: 2020-11-17

## 2020-11-08 RX ADMIN — PSYLLIUM HUSK 1 PACKET: 3.4 POWDER ORAL at 12:45

## 2020-11-08 RX ADMIN — METRONIDAZOLE 500 MG: 500 TABLET ORAL at 12:45

## 2020-11-08 RX ADMIN — OXYCODONE HYDROCHLORIDE AND ACETAMINOPHEN 1 TABLET: 5; 325 TABLET ORAL at 08:04

## 2020-11-08 RX ADMIN — LISINOPRIL 10 MG: 5 TABLET ORAL at 05:09

## 2020-11-08 RX ADMIN — SPIRONOLACTONE 25 MG: 25 TABLET ORAL at 05:10

## 2020-11-08 RX ADMIN — ONDANSETRON 4 MG: 4 TABLET, ORALLY DISINTEGRATING ORAL at 05:15

## 2020-11-08 RX ADMIN — POTASSIUM CHLORIDE 40 MEQ: 1500 TABLET, EXTENDED RELEASE ORAL at 05:09

## 2020-11-08 RX ADMIN — METOPROLOL SUCCINATE 50 MG: 50 TABLET, EXTENDED RELEASE ORAL at 05:09

## 2020-11-08 RX ADMIN — IOHEXOL 25 ML: 240 INJECTION, SOLUTION INTRATHECAL; INTRAVASCULAR; INTRAVENOUS; ORAL at 23:58

## 2020-11-08 RX ADMIN — LINEZOLID 600 MG: 600 TABLET, FILM COATED ORAL at 05:09

## 2020-11-08 RX ADMIN — IOHEXOL 100 ML: 350 INJECTION, SOLUTION INTRAVENOUS at 23:58

## 2020-11-08 RX ADMIN — OXYCODONE HYDROCHLORIDE AND ACETAMINOPHEN 2 TABLET: 5; 325 TABLET ORAL at 16:47

## 2020-11-08 RX ADMIN — FUROSEMIDE 40 MG: 40 TABLET ORAL at 05:09

## 2020-11-08 RX ADMIN — SODIUM CHLORIDE, POTASSIUM CHLORIDE, SODIUM LACTATE AND CALCIUM CHLORIDE: 600; 310; 30; 20 INJECTION, SOLUTION INTRAVENOUS at 05:17

## 2020-11-08 RX ADMIN — METRONIDAZOLE 500 MG: 500 TABLET ORAL at 21:49

## 2020-11-08 RX ADMIN — CEFTRIAXONE SODIUM 2 G: 2 INJECTION, POWDER, FOR SOLUTION INTRAMUSCULAR; INTRAVENOUS at 16:46

## 2020-11-08 RX ADMIN — OXYCODONE HYDROCHLORIDE AND ACETAMINOPHEN 2 TABLET: 5; 325 TABLET ORAL at 12:45

## 2020-11-08 RX ADMIN — SERTRALINE 125 MG: 100 TABLET, FILM COATED ORAL at 05:09

## 2020-11-08 RX ADMIN — FLUCONAZOLE 400 MG: 200 TABLET ORAL at 05:08

## 2020-11-08 RX ADMIN — METRONIDAZOLE 500 MG: 500 TABLET ORAL at 05:09

## 2020-11-08 RX ADMIN — LINEZOLID 600 MG: 600 TABLET, FILM COATED ORAL at 16:47

## 2020-11-08 RX ADMIN — OLANZAPINE 7.5 MG: 5 TABLET, FILM COATED ORAL at 21:49

## 2020-11-08 RX ADMIN — ONDANSETRON 4 MG: 2 INJECTION INTRAMUSCULAR; INTRAVENOUS at 12:40

## 2020-11-08 ASSESSMENT — PAIN DESCRIPTION - PAIN TYPE
TYPE: ACUTE PAIN

## 2020-11-08 ASSESSMENT — FIBROSIS 4 INDEX: FIB4 SCORE: 1.61

## 2020-11-08 NOTE — PROGRESS NOTES
Received report from Ruby PÉREZ, at pt bedside.  POC discussed. Call light and belongings within reach. Bed locked and in low position. Alarm on and fall precautions in place.

## 2020-11-08 NOTE — CONSULTS
Cardiology Consult Note:    Sirena Larson M.D.  Date & Time note created:    11/8/2020   4:53 PM     Referring MD:  Dr. Lindsay    Patient ID:   Name:             Sebastián Castro     YOB: 1965  Age:                 55 y.o.  male   MRN:               [id number]                                                             Chief Complaint / Reason for consult:  Atrial fibrillation with RVR.    History of Present Illness:    This is a 55 years old man with prior history of paroxysmal atrial fibrillation, history of normal coronary angiography, mild LV dysfunction, presented to the hospital and currently diagnosed with bacteremia, perisplenic abscess status post CT-guided drainage, is currently in atrial fibrillation with heart rate in the 100s.  Cardiology is been consulted for further evaluation and management.    His current echocardiogram showed LV function of 30% which is reduced from prior echocardiogram done in 2018.  Mild mitral regurgitation.    Patient declines having shortness of breath or chest pain at this time.  Still has some abdominal pain.    EKG has been interpreted by me which showed atrial fibrillation with ventricular rate of 131.    Review of Systems:      Constitutional: Denies fevers, Denies weight changes  Eyes: Denies changes in vision, no eye pain  Ears/Nose/Throat/Mouth: Denies nasal congestion or sore throat   Cardiovascular: no chest pain, no palpitations   Respiratory: no shortness of breath , Denies cough  Gastrointestinal/Hepatic: + abdominal pain  Genitourinary: Denies dysuria or frequency  Musculoskeletal/Rheum: Denies  joint pain and swelling   Skin: Denies rash  Neurological: Denies headache, confusion, memory loss or focal weakness/parasthesias  Psychiatric: denies mood disorder   Endocrine: Nayla thyroid problems  Heme/Oncology/Lymph Nodes: Denies enlarged lymph nodes, denies brusing or known bleeding disorder  All other systems were reviewed and are negative  (AMA/CMS criteria)                Past Medical History:   Past Medical History:   Diagnosis Date   • A-fib ([facility])    • Abscess 11/8/2020   • Bacteremia 11/8/2020   • Chest pain    • Congestive heart failure (HCC)    • Diabetes ([facility])    • Diabetic neuropathy ([facility])    • Hypercholesterolemia    • Hypertension    • Longstanding persistent atrial fibrillation ([facility]) 11/8/2020   • LV dysfunction 11/8/2020   • Morbid obesity ([facility])    • NICM (nonischemic cardiomyopathy) ([facility]) 11/8/2020   • Noncompliance    • Normal cardiac stress test    • Orthostatic hypotension    • Sepsis ([facility]) 11/8/2020     Active Hospital Problems    Diagnosis   • Bacteremia [R78.81]   • Abscess [L02.91]   • Sepsis ([facility]) [A41.9]   • Longstanding persistent atrial fibrillation ([facility]) [I48.11]   • LV dysfunction [I51.9]   • NICM (nonischemic cardiomyopathy) ([facility]) [I42.8]       Past Surgical History:  Past Surgical History:   Procedure Laterality Date   • [facility] CARDIAC CATH  07/21/2018    EF 45%, normal coronaries.   • IRRIGATION & DEBRIDEMENT ORTHO Right 2/19/2018    Procedure: IRRIGATION & DEBRIDEMENT ORTHO-FOOT;  Surgeon: Kirby Lopez M.D.;  Location: SURGERY [facility];  Service: Orthopedics   • TOE AMPUTATION Right 1/17/2018    Procedure: TOE AMPUTATION-1ST RAY;  Surgeon: Doug Delong M.D.;  Location: SURGERY [facility];  Service: Orthopedics   • TOE AMPUTATION Right 11/10/2017    Procedure: TOE AMPUTATION;  Surgeon: Osorio Leo M.D.;  Location: SURGERY [facility];  Service: Orthopedics       Hospital Medications:    Current Facility-Administered Medications:   •  ondansetron (ZOFRAN) syringe/vial injection 4 mg, 4 mg, Intravenous, Q4HRS PRN, Ana Cristina Lindsay M.D., 4 mg at 11/08/20 1240  •  influenza vaccine quad injection 0.5 mL, 0.5 mL, Intramuscular, Once PRN, Ana Cristina Lindsay M.D.  •  [START ON 11/9/2020] metoprolol SR (TOPROL XL) tablet 50 mg, 50 mg, Oral, DAILY, Andressa Peace M.D.  •  psyllium (METAMUCIL/KONSYL) 1  Packet, 1 Packet, Oral, DAILY, Andressa Peace M.D., 1 Packet at 11/08/20 1245  •  linezolid (ZYVOX) tablet 600 mg, 600 mg, Oral, Q12HRS, Dianne Fang M.D., 600 mg at 11/08/20 1647  •  ondansetron (ZOFRAN ODT) dispertab 4 mg, 4 mg, Oral, Q4HRS PRN, Vilma Horvath M.D., 4 mg at 11/08/20 0515  •  fluconazole (DIFLUCAN) tablet 400 mg, 400 mg, Oral, DAILY, Dianne Fang M.D., 400 mg at 11/08/20 0508  •  potassium chloride SA (Kdur) tablet 40 mEq, 40 mEq, Oral, DAILY, Mariposa Collins M.D., 40 mEq at 11/08/20 0509  •  oxyCODONE-acetaminophen (PERCOCET) 5-325 MG per tablet 1-2 Tab, 1-2 Tab, Oral, Q4HRS PRN, Mariposa Collins M.D., 2 Tab at 11/08/20 1647  •  furosemide (LASIX) tablet 40 mg, 40 mg, Oral, DAILY, Helena Ozuna M.D., 40 mg at 11/08/20 0509  •  lisinopril (PRINIVIL) tablet 10 mg, 10 mg, Oral, DAILY, Helena Ozuna M.D., 10 mg at 11/08/20 0509  •  OLANZapine (ZYPREXA) tablet 7.5 mg, 7.5 mg, Oral, QHS, Helena Ozuna M.D., 7.5 mg at 11/07/20 2127  •  sertraline (Zoloft) tablet 125 mg, 125 mg, Oral, DAILY, Helena Ozuna M.D., 125 mg at 11/08/20 0509  •  spironolactone (ALDACTONE) tablet 25 mg, 25 mg, Oral, DAILY, Helena Ozuna M.D., 25 mg at 11/08/20 0510  •  insulin regular (HumuLIN R,NovoLIN R) injection, 1-6 Units, Subcutaneous, Q6HRS, Stopped at 11/06/20 1800 **AND** POC Blood Glucose, , , Q6H **AND** NOTIFY MD and PharmD, , , Once **AND** glucose 4 g chewable tablet 16 g, 16 g, Oral, Q15 MIN PRN **AND** dextrose 50% (D50W) injection 50 mL, 50 mL, Intravenous, Q15 MIN PRN, Helena Ozuna M.D.  •  labetalol (NORMODYNE/TRANDATE) injection 10 mg, 10 mg, Intravenous, Q4HRS PRN, Helena Ozuna M.D.  •  metroNIDAZOLE (FLAGYL) tablet 500 mg, 500 mg, Oral, Q8HRS, Helena Ozuna M.D., 500 mg at 11/08/20 1245  •  cefTRIAXone (ROCEPHIN) 2 g in  mL IVPB, 2 g, Intravenous, Q EVENING, Helena Ozuna M.D., Last Rate: 200  mL/hr at 11/08/20 1646, 2 g at 11/08/20 1646    Current Outpatient Medications:  Medications Prior to Admission   Medication Sig Dispense Refill Last Dose   • cefdinir (OMNICEF) 300 MG Cap Take 300 mg by mouth 2 times a day.   11/3/2020 at 1000   • doxycycline monohydrate (ADOXA) 100 MG tablet Take 100 mg by mouth 2 times a day.   11/3/2020 at 1000   • metroNIDAZOLE (FLAGYL) 500 MG Tab Take 500 mg by mouth 3 times a day.   11/3/2020 at 1000   • sertraline (ZOLOFT) 25 MG tablet Take 25 mg by mouth every day. Take with sertraline 100 mg = 125 mg   11/3/2020 at 1000   • aspirin EC 81 MG EC tablet Take 1 Tab by mouth every day. 30 Tab  11/3/2020 at 1000   • furosemide (LASIX) 40 MG Tab Take 1 Tab by mouth every day. 30 Tab  11/3/2020 at 1000   • lisinopril (PRINIVIL) 10 MG Tab Take 1 Tab by mouth every day. 30 Tab  11/3/2020 at 1000   • spironolactone (ALDACTONE) 25 MG Tab Take 1 Tab by mouth every day. 30 Tab 3    • metoprolol SR (TOPROL XL) 25 MG TABLET SR 24 HR Take 2 Tabs by mouth every day. 30 Tab  11/3/2020 at 1000   • olanzapine (ZYPREXA) 7.5 MG tablet Take 7.5 mg by mouth every bedtime.   11/2/2020 at 2200   • sertraline (ZOLOFT) 100 MG Tab Take 100 mg by mouth every day. Take with sertraline 25 mg = 125 mg   11/3/2020 at 1000       Medication Allergy:  Allergies   Allergen Reactions   • Daptomycin Rash     Body rash  RXN=1/2018     • Pcn [Penicillins] Hives and Rash      Rash & hives  RXN=since a kid  Tolerates cephalosporins   • Unasyn [Ampicillin-Sulbactam Sodium] Hives, Itching and Swelling   • Vancomycin Rash     Red man syndrome       Family History:  Family History   Problem Relation Age of Onset   • No Known Problems Mother    • No Known Problems Father        Social History:  Social History     Socioeconomic History   • Marital status:      Spouse name: Not on file   • Number of children: Not on file   • Years of education: Not on file   • Highest education level: Not on file   Occupational  History   • Not on file   Social Needs   • Financial resource strain: Not hard at all   • Food insecurity     Worry: Never true     Inability: Never true   • Transportation needs     Medical: No     Non-medical: No   Tobacco Use   • Smoking status: Never Smoker   • Smokeless tobacco: Never Used   Substance and Sexual Activity   • Alcohol use: No   • Drug use: No   • Sexual activity: Not on file   Lifestyle   • Physical activity     Days per week: Not on file     Minutes per session: Not on file   • Stress: Not on file   Relationships   • Social connections     Talks on phone: Not on file     Gets together: Not on file     Attends Restorationism service: Not on file     Active member of club or organization: Not on file     Attends meetings of clubs or organizations: Not on file     Relationship status: Not on file   • Intimate partner violence     Fear of current or ex partner: Not on file     Emotionally abused: Not on file     Physically abused: Not on file     Forced sexual activity: Not on file   Other Topics Concern   • Not on file   Social History Narrative   • Not on file         Physical Exam:  Vitals/ General Appearance:   Weight/BMI: Body mass index is 46.73 kg/m².  /76   Pulse (!) 101   Temp 37 °C (98.6 °F) (Temporal)   Resp 18   Ht 1.829 m (6')   Wt (!) 156.3 kg (344 lb 9.3 oz)   SpO2 92%   Vitals:    11/08/20 0400 11/08/20 0746 11/08/20 1147 11/08/20 1616   BP: 120/82 137/83 123/83 101/76   Pulse: (!) 127 (!) 119 (!) 126 (!) 101   Resp: 18 18 20 18   Temp: 37.1 °C (98.8 °F) 36.9 °C (98.5 °F) 36.8 °C (98.2 °F) 37 °C (98.6 °F)   TempSrc: Temporal Temporal Temporal Temporal   SpO2: 92% 90% 90% 92%   Weight:       Height:         Oxygen Therapy:  Pulse Oximetry: 92 %, O2 (LPM): 0, O2 Delivery Device: None - Room Air    Constitutional:   No acute distress  HENMT:  Normocephalic, Atraumatic, Oropharynx moist mucous membranes, No oral exudates, Nose normal.  No thyromegaly.  Eyes:  EOMI, Conjunctiva  normal, No discharge.  Neck:  Normal range of motion, No cervical tenderness,  no JVD.  Cardiovascular:  There is presence of an irregularly irregular heartbeats. No murmurs, No rubs, No gallops.   Extremitites with intact distal pulses, no cyanosis, or edema.  Lungs:  Normal breath sounds, breath sounds clear to auscultation bilaterally,  no rales, no rhonchi, no wheezing.   Abdomen: Bowel sounds normal, Soft  Skin: Warm, Dry, No erythema, No rash, no induration.  Neurologic: Alert & oriented x 3, No focal deficits noted, cranial nerves II through X are intact.  Psychiatric: Affect normal, Judgment normal, Mood normal.      MDM (Data Review):     Records reviewed and summarized in current documentation    Lab Data Review:  Recent Results (from the past 24 hour(s))   ACCU-CHEK GLUCOSE    Collection Time: 20 11:33 PM   Result Value Ref Range    Glucose - Accu-Ck 148 (H) 65 - 99 mg/dL   ACCU-CHEK GLUCOSE    Collection Time: 20  6:03 AM   Result Value Ref Range    Glucose - Accu-Ck 108 (H) 65 - 99 mg/dL   ACCU-CHEK GLUCOSE    Collection Time: 20 12:48 PM   Result Value Ref Range    Glucose - Accu-Ck 127 (H) 65 - 99 mg/dL   EKG    Collection Time: 20  2:20 PM   Result Value Ref Range    Report       [facility]    Test Date:  2020  Pt Name:    EMELY FLORES                Department: 183  MRN:        [id number]                      Room:       T805  Gender:     Male                         Technician: MACIEJ  :        1965                   Requested By:ROSA  TO  Order #:    [id number]                    Reading MD:    Measurements  Intervals                                Axis  Rate:       131                          P:  ID:                                      QRS:        28  QRSD:       102                          T:          34  QT:         336  QTc:        496    Interpretive Statements  ATRIAL FIBRILLATION  LOW VOLTAGE, EXTREMITY LEADS  ABNORMAL R-WAVE PROGRESSION,  LATE TRANSITION  BORDERLINE PROLONGED QT INTERVAL  Compared to ECG 11/03/2020 20:37:09  Poor R-wave progression no longer present         Imaging/Procedures Review:    Chest Xray:  Reviewed    EKG:   As in HPI.     MDM (Assessment and Plan):     Active Hospital Problems    Diagnosis   • Bacteremia [R78.81]   • Abscess [L02.91]   • Sepsis (HCC) [A41.9]   • Longstanding persistent atrial fibrillation (HCC) [I48.11]   • LV dysfunction [I51.9]   • NICM (nonischemic cardiomyopathy) (HCC) [I42.8]         At this time, no further treatment for patient's atrial fibrillation.  Atrial fibrillation with rapid ventricular rate is secondary and reactive to patient's current medical issues of sepsis / bacteremia / infection.  I explained to patient that atrial fibrillation is not dangerous to the overall health at this time.  No indication for strict control of heart rate unless patient is hemodynamically unstable.  Okay for heart rate to be relatively higher even in the 150s 160s as long as patient is hemodynamically stable.  I presume that heart rate will be better controlled once patient's current medical issues of sepsis / bacteremia / infection. resolved.  Sinus restoration is not beneficial at this point time.  The risk of recurrent back into atrial fibrillation is relatively high in the setting of sepsis / bacteremia / infection..  Patient does not appear to be in volume overloaded state at this time.    In terms of patient's LV dysfunction, of note, patient is not in acute decompensated heart failure at this time.  Patient also had prior coronary angiography which showed normal coronaries.  LV dysfunction could be worsened due to current situation of stress related to sepsis / bacteremia / infection.  Therefore, I will recommend repeat future limited transthoracic echocardiogram to accurately assess LV function once sepsis / bacteremia / infection resolves.  This certainly could be done in the outpatient setting as  well.    In terms of medical therapy, continue Toprol-XL 50 mg p.o. once a day, continue lisinopril 10 mg p.o. once a day and spironolactone at 25 mg p.o. once a day.    In terms of his stroke risk reduction in setting of atrial fibrillation, was actually seen by one of our electrophysiologist in the past in 2019.  He was deemed not to be a good candidate for anticoagulation due to frequent falls secondary to orthostatic hypotension.      Will sign off at this time, please call us with further questions.  Patient will be followed in the outpatient cardiology clinic for further cardiac care.     Thank you for referring this patient to our cardiology service.     Sirena Larson MD.   Cardiology Inpatient Service.  [facility].  [phone].  Milla Barr.

## 2020-11-08 NOTE — PROGRESS NOTES
Assumed care at 0700, bedside report received from Edith PÉREZ. Pt. Is afib on the monitor. Initial assessment completed, orders reviewed, call light within reach, bed alarm is in use, and hourly rounding in place. POC addressed with patient, no additional questions at this time.

## 2020-11-08 NOTE — PROGRESS NOTES
Trauma/Surgical Progress Note    Author: Eric Kearns M.D. Date & Time created: 11/7/2020   8:26 PM   Perisplenic abscess   PostOp Diagnosis: Perisplenic Abscess        Procedure(s): CT guided Drain Placement        Estimated Blood Loss: Less than 5 ml           Complications: None                 11/4/2020     6:01 PM     Florencio Stanley M.D.  Interval Events:  rePorts pain well controlled tolerating diet feeling improved over yesterday    Hemodynamics:  /68   Pulse (!) 116   Temp 36.1 °C (97 °F) (Temporal)   Resp 16   Ht 1.829 m (6')   Wt (!) 156.3 kg (344 lb 9.3 oz)   SpO2 93%      Respiratory:    Respiration: 16, Pulse Oximetry: 93 %     Work Of Breathing / Effort: Mild;Shallow  RUL Breath Sounds: Clear, RML Breath Sounds: Diminished, RLL Breath Sounds: Diminished, ELIAS Breath Sounds: Clear, LLL Breath Sounds: Diminished  Fluids:  No intake or output data in the 24 hours ending 11/07/20 2026  Admit Weight: (!) 169.2 kg (373 lb)  Current Weight: (!) 156.3 kg (344 lb 9.3 oz)    Physical Exam  Awake alert  Friendly cooperative  No distress  Breathing with ease  Drain in place  Abdomen soft no peritoneal signs  Medical Decision Making/Problem List:    There are no active hospital problems to display for this patient.    Core Measures & Quality Metrics:  Core Measures & Quality Metrics  LEONOR Score      Assessment/Plan  Recovering well  Continue drain care  Antibiotics as directed by infectious disease  Encourage incentive spirometry and ambulation  Abdominal exam  Follow-up labs  Follow-up imaging

## 2020-11-08 NOTE — PROGRESS NOTES
Trauma/Surgical Progress Note    Author: Eric Kearns M.D. Date & Time created: 11/8/2020   2:44 PM   11/4/2020  IR Perisplenic abscess   PostOp Diagnosis: Perisplenic Abscess        Procedure(s): CT guided Drain Placement        Estimated Blood Loss: Less than 5 ml           Complications: None      Interval Events:  Reports tolerating diet  Reports pain control improved    Hemodynamics:  /83   Pulse (!) 126   Temp 36.8 °C (98.2 °F) (Temporal)   Resp 20   Ht 1.829 m (6')   Wt (!) 156.3 kg (344 lb 9.3 oz)   SpO2 90%      Respiratory:    Respiration: 20, Pulse Oximetry: 90 %     Work Of Breathing / Effort: Mild;Shallow  RUL Breath Sounds: Clear, RML Breath Sounds: Diminished, RLL Breath Sounds: Diminished, ELIAS Breath Sounds: Clear, LLL Breath Sounds: Diminished  Fluids:  No intake or output data in the 24 hours ending 11/08/20 1444  Admit Weight: (!) 169.2 kg (373 lb)  Current Weight: (!) 156.3 kg (344 lb 9.3 oz)    Physical Exam  Awake alert appropriate  No distress  Breathing with ease  Abdomen soft mild tenderness  Medical Decision Making/Problem List:    There are no active hospital problems to display for this patient.    Core Measures & Quality Metrics:  Core Measures & Quality Metrics  LEONOR Score      Assessment/Plan  11/ 3 CT scan collection associated the spleen and colon   Initial treatment with IR drain  Antibiotics  Clinically responding  Follow-up evaluation CT scan  Discussed findings and plan with patient  Raquel with nursing drain care and output measurement

## 2020-11-08 NOTE — PROGRESS NOTES
[facility] [place] [facility] [place]GRESS [place]     Attending:   Dr. Rowe    Resident:   Andressa Peace MD    PATIENT:   Sebastián Castro; [id number]; 1965    ID:   55 y.o. male recently discharged 10/31 from [facility] for splenic hematoma admitted for perisplenic abscess, a-fib with RVR    SUBJECTIVE:   Patient still having diarrhea. States abdominal pain is the same. No further complaints.    OBJECTIVE:  Vitals:    11/08/20 0050 11/08/20 0400 11/08/20 0746 11/08/20 1147   BP: 118/65 120/82 137/83 123/83   Pulse: (!) 120 (!) 127 (!) 119 (!) 126   Resp: 18 18 18 20   Temp: 36.3 °C (97.3 °F) 37.1 °C (98.8 °F) 36.9 °C (98.5 °F) 36.8 °C (98.2 °F)   TempSrc: Temporal Temporal Temporal Temporal   SpO2: 92% 92% 90% 90%   Weight:       Height:         No intake or output data in the 24 hours ending 11/08/20 1513    PHYSICAL EXAM:  General: No acute distress, afebrile, resting comfortably  HEENT: NC/AT. EOMI. MMM, neck supple without adenopathy  Cardiovascular: +Irregularly irregular rhythm, tachycardic, normal S1/S2 no murmurs rubs, or gallops, cap refill brisk.  Respiratory: CTAB, no tachypnea or retractions  Abdomen: soft,obese,+tender to mild palpation, normoactive BS. BETTE drain with cloudy brown output on left side. No guarding/rebound.  EXT:  +bruising on upper extremities. 1+ LE edema. No rashes or skin changes noted. Pulses 2+ DP and radial bilaterally  Neuro: Non-focal      LABS:  Recent Labs     11/06/20  0121 11/07/20  0029   WBC 10.7 9.4   RBC 4.50* 5.02   HEMOGLOBIN 11.4* 12.6*   HEMATOCRIT 37.2* 43.1   MCV 82.7 85.9   MCH 25.3* 25.1*   RDW 51.6* 55.1*   PLATELETCT 306 245   MPV 9.9 10.5   NEUTSPOLYS 70.90 62.00   LYMPHOCYTES 15.70* 21.40*   MONOCYTES 10.30 13.70*   EOSINOPHILS 2.10 1.80   BASOPHILS 0.50 0.60     Recent Labs     11/06/20  0121 11/07/20  0315   SODIUM 132* 130*   POTASSIUM 4.5 4.8   CHLORIDE 101 98   CO2 22 21   BUN 18 21   CREATININE 0.86 1.00   CALCIUM 8.3* 8.6   ALBUMIN 2.3* 2.3*     Estimated GFR/CRCL  = Estimated Creatinine Clearance: 125.8 mL/min (by C-G formula based on SCr of 1 mg/dL).  Recent Labs     11/06/20  0121 11/06/20  0121 11/07/20 0315 11/07/20  0315 11/07/20  2333 11/08/20  0603 11/08/20  1248   GLUCOSE 167*  --  146*  --   --   --   --    POCGLUCOSE  --    < >  --    < > 148* 108* 127*    < > = values in this interval not displayed.     Recent Labs     11/06/20  0121 11/07/20 0315   ASTSGOT 23 23   ALTSGPT 14 11   TBILIRUBIN 0.6 0.7   ALKPHOSPHAT 105* 93   GLOBULIN 3.9* 4.3*             No results for input(s): INR, APTT, FIBRINOGEN in the last 72 hours.    Invalid input(s): DIMER    MICROBIOLOGY:   Blood Culture   Date Value Ref Range Status   03/15/2018 No growth after 5 days of incubation.  Final        IMAGING:   CT-DRAIN-PERITONEAL   Final Result      1.  CT guided perisplenic abscess catheter drainage.   2.  The current plan is to obtain a follow-up CT in 5-7 days..      OUTSIDE IMAGES-CT CHEST   Final Result      OUTSIDE IMAGES-CT ABDOMEN /PELVIS   Final Result      CT-ABDOMEN-PELVIS WITH   Final Result   Addendum 1 of 1   Addendum:   A prior noncontrast CT of the abdomen and pelvis from [facility] dated 10/19/2020 was made available for comparison. On the    prior study a perisplenic/subcapsular fluid collection was seen however    did not contain air at that time and    there was no discrete evidence of communication with the transverse colon.    Communication with splenic flexure of the colon and air within the    collection are new from the prior study.      Final      DX-CHEST-PORTABLE (1 VIEW)   Final Result      Slight interval interval improvement in congestive heart failure.          CULTURES:   Results     Procedure Component Value Units Date/Time    Aerobic/Anaerobic Culture (Surgery) [[id number]] Collected: 11/04/20 1730    Order Status: Completed Specimen: Wound from Peritoneal Fluid Updated: 11/08/20 0829     Significant Indicator NEG     Source WND      Site Peritoneal Drain     Culture Result Order placed in Surgery. Source appropriate culture orders  have been placed in Micro for testing.      Narrative:      Special Contact [id number] MAXINE Stevenson fluid from peritoneal drain  Special Contact [id number] MAXINE ELLIS    Anaerobic Culture [[id number]] Collected: 11/04/20 1730    Order Status: Completed Specimen: Wound Updated: 11/08/20 0829     Significant Indicator NEG     Source WND     Site Peritoneal Drain     Culture Result No Anaerobes isolated.    Narrative:      Special Contact [id number] MAXINE Stevenson fluid from peritoneal drain  Special Contact [id number] MAXINE ELLIS    CULTURE WOUND W/ GRAM STAIN [[id number]]  (Abnormal)  (Susceptibility) Collected: 11/04/20 1730    Order Status: Completed Specimen: Wound Updated: 11/08/20 0829     Significant Indicator POS     Source WND     Site Peritoneal Drain     Culture Result -     Gram Stain Result Rare WBCs.  Few Gram positive cocci.  Few Yeast.       Culture Result Enterococcus faecalis  Heavy growth        Candida albicans  Moderate growth      Narrative:      Special Contact [id number] MAXINE Stevenson fluid from peritoneal drain  Special Contact [id number] MAXINE ELLIS    Susceptibility     Enterococcus faecalis (1)     Antibiotic Interpretation Method Status    Daptomycin Sensitive ISAMAR Final    Ampicillin Sensitive ISAMAR Final    Gent Synergy Sensitive ISAMAR Final    Vancomycin Sensitive ISAMAR Final    Penicillin Sensitive ISAMAR Final                   CULTURE STOOL [[id number]] Collected: 11/04/20 1609    Order Status: Completed Specimen: Stool Updated: 11/07/20 1608     Significant Indicator NEG     Source STL     Site STOOL     Culture Result Shiga Toxin testing not performed due to lack of growth in  [place] broth.  No enteric pathogens, only usual Gram positive enteric  joselin isolated.  NOTE:  Stool cultures are screened for Shiga Toxins 1 and  "2,  Salmonella, Shigella, Campylobacter, Aeromonas,  Plesiomonas, and Vibrio.      Narrative:      Special Contact [id number] EMIL PRITCHARD  Does this patient have risk factors for C-diff?->Yes  Special Contact [id number] EMIL PRITCHARD    URINALYSIS [[id number]]  (Abnormal) Collected: 11/06/20 1235    Order Status: Completed Specimen: Urine, Clean Catch Updated: 11/06/20 1327     Color DK Yellow     Character Clear     Specific Gravity 1.019     Ph 5.0     Glucose Negative mg/dL      Ketones Negative mg/dL      Protein Negative mg/dL      Bilirubin Negative     Urobilinogen, Urine 0.2     Nitrite Negative     Leukocyte Esterase Trace     Occult Blood Negative     Micro Urine Req Microscopic    Narrative:      Collected By:[id number] JF MURDOCK    BLOOD CULTURE [[id number]]  (Abnormal) Collected: 11/03/20 1528    Order Status: Completed Specimen: Blood from Peripheral Updated: 11/06/20 1141     Significant Indicator POS     Source BLD     Site PERIPHERAL     Culture Result Growth detected by Bactec instrument.  11/04/2020  12:52      Viridans Streptococcus  Possible Contaminant  Isolated from one bottle only, correlate with clinical  condition.      Narrative:      CALL  [place]  183 tel. [phone],  CALLED  183 tel. [phone] 11/04/2020, 12:59, RB PERF. RESULTS CALLED TO:  [id number], RN  Per Hospital Policy: Only change Specimen Src: to \"Line\" if  specified by physician order.  No site indicated    BLOOD CULTURE [[id number]] Collected: 11/05/20 1950    Order Status: Completed Specimen: Blood from Peripheral Updated: 11/06/20 1025     Significant Indicator NEG     Source BLD     Site PERIPHERAL     Culture Result No Growth  Note: Blood cultures are incubated for 5 days and  are monitored continuously.Positive blood cultures  are called to the RN and reported as soon as  they are identified.      Narrative:      Per Hospital Policy: Only change Specimen Src: to \"Line\" if  specified by physician " "order.  Right AC    BLOOD CULTURE [[id number]] Collected: 11/05/20 1950    Order Status: Completed Specimen: Blood from Peripheral Updated: 11/06/20 1025     Significant Indicator NEG     Source BLD     Site PERIPHERAL     Culture Result No Growth  Note: Blood cultures are incubated for 5 days and  are monitored continuously.Positive blood cultures  are called to the RN and reported as soon as  they are identified.      Narrative:      Per Hospital Policy: Only change Specimen Src: to \"Line\" if  specified by physician order.  Right Hand    URINE CULTURE(NEW) [[id number]] Collected: 11/03/20 1707    Order Status: Completed Specimen: Urine Updated: 11/06/20 0749     Significant Indicator NEG     Source UR     Site -     Culture Result Mixed skin joselin <10,000 cfu/mL    Narrative:      Indication for culture:->Emergency Room Patient  Indication for culture:->Emergency Room Patient    GRAM STAIN [[id number]] Collected: 11/04/20 1730    Order Status: Completed Specimen: Wound Updated: 11/05/20 0137     Significant Indicator .     Source WND     Site Peritoneal Drain     Gram Stain Result Rare WBCs.  Few Gram positive cocci.  Few Yeast.      Narrative:      Special Contact [id number] MAXINE ELLIS  Brown fluid from peritoneal drain  Special Contact [id number] MAXINE ELLIS    C Diff by PCR rflx Toxin [[id number]] Collected: 11/04/20 1609    Order Status: Completed Specimen: Stool Updated: 11/04/20 2202     C Diff by PCR Negative     Comment: C. difficile NOT detected by PCR.  Treatment not indicated per guidelines.  Repeat testing not indicated within 7 days.          027-NAP1-BI Presumptive Negative     Comment: Presumptive 027/NAP1/BI target DNA sequences are NOT DETECTED.       Narrative:      Special Contact [id number] EMIL PRITCHARD  Does this patient have risk factors for C-diff?->Yes    FLUID CULTURE W/GRAM STAIN [[id number]] Collected: 11/04/20 1730    Order Status: Canceled Specimen: Other from " "Peritoneal Fluid     BLOOD CULTURE [[id number]] Collected: 11/03/20 1626    Order Status: Completed Specimen: Blood from Peripheral Updated: 11/04/20 0735     Significant Indicator NEG     Source BLD     Site PERIPHERAL     Culture Result No Growth  Note: Blood cultures are incubated for 5 days and  are monitored continuously.Positive blood cultures  are called to the RN and reported as soon as  they are identified.      Narrative:      Per Hospital Policy: Only change Specimen Src: to \"Line\" if  specified by physician order.  No site indicated    URINALYSIS [[id number]]  (Abnormal) Collected: 11/03/20 1707    Order Status: Completed Specimen: Urine Updated: 11/03/20 1747     Color DK Yellow     Character Cloudy     Specific Gravity 1.026     Ph 5.0     Glucose Negative mg/dL      Ketones Trace mg/dL      Protein 30 mg/dL      Bilirubin Small     Urobilinogen, Urine 1.0     Nitrite Negative     Leukocyte Esterase Trace     Occult Blood Negative     Micro Urine Req Microscopic    Narrative:      Indication for culture:->Emergency Room Patient    Routine (COVID/SARS COV-2 In-House PCR up to 24 hours) [[id number]]     Order Status: Completed Specimen: Respirate from Nasopharyngeal           MEDS:  Current Facility-Administered Medications   Medication Last Admin   • ondansetron (ZOFRAN) syringe/vial injection 4 mg 4 mg at 11/08/20 1240   • influenza vaccine quad injection 0.5 mL     • [START ON 11/9/2020] metoprolol SR (TOPROL XL) tablet 50 mg     • psyllium (METAMUCIL/KONSYL) 1 Packet 1 Packet at 11/08/20 1245   • linezolid (ZYVOX) tablet 600 mg 600 mg at 11/08/20 0509   • ondansetron (ZOFRAN ODT) dispertab 4 mg 4 mg at 11/08/20 0515   • fluconazole (DIFLUCAN) tablet 400 mg 400 mg at 11/08/20 0508   • potassium chloride SA (Kdur) tablet 40 mEq 40 mEq at 11/08/20 0509   • oxyCODONE-acetaminophen (PERCOCET) 5-325 MG per tablet 1-2 Tab 2 Tab at 11/08/20 1245   • furosemide (LASIX) tablet 40 mg 40 mg at 11/08/20 0509   • " lisinopril (PRINIVIL) tablet 10 mg 10 mg at 11/08/20 0509   • OLANZapine (ZYPREXA) tablet 7.5 mg 7.5 mg at 11/07/20 2127   • sertraline (Zoloft) tablet 125 mg 125 mg at 11/08/20 0509   • spironolactone (ALDACTONE) tablet 25 mg 25 mg at 11/08/20 0510   • insulin regular (HumuLIN R,NovoLIN R) injection Stopped at 11/06/20 1800    And   • glucose 4 g chewable tablet 16 g      And   • dextrose 50% (D50W) injection 50 mL     • labetalol (NORMODYNE/TRANDATE) injection 10 mg     • metroNIDAZOLE (FLAGYL) tablet 500 mg 500 mg at 11/08/20 1245   • cefTRIAXone (ROCEPHIN) 2 g in  mL IVPB Stopped at 11/07/20 1718       PROBLEM LIST:  No problems updated.    ASSESSMENT/PLAN: 55 y.o. male admitted for perisplenic abscess and bacteremia. Followed by ID and general surgery.    # Perisplenic abscess s/p BETTE drain, growing Enterococcus faecalis and candida albicans  # Gram Positive cocci bacteremia with strep   - General surgery following; recommending repeat CTAP 11/8  - ID following; given that the abscess has potential communication with bowel prefer broad spectrum coverage with CTX, Flagyl, and PO linezolid 600 mg for the   - Continue fluconazole 40 mg daily to treat the yeast in the blood while awaiting susceptibilities-patient with penicillin allergy as described above limiting antibiotic choices-noted that the patient is on sertraline.  Risk of serotonin syndrome is low as this is exceptionally rare.  However, monitor for any change in vitals such as new fevers or agitation.  If these occur will stop linezolid and transition to vancomycin.     # Diarrhea, likely 2/2 Abx  Recent abx use, recent hospitalization.  C. diff negative. Continue to monitor.  Stool culture negative  - Hold immodium     # Atrial fibrillation with rapid ventricular response  Was not on anticoagulation at home, and he does not know why. CHADSVASC of 3. Does not appear he has been following up with cardiology regularly, though he has seen a  cardiologist.   Cardiology consulted 11/8 and explained that increased HR is physiological compensation to maintain adequate cardiac output in the setting of infection with CHF. Patient has reduced EF, and tachycardia is natural response to infection.  - Maintain metoprolol 50mg daily  - Cardiology to provide one-time consultation note, but likely to sign off thereafter  - Consider anticoagulation after surgery removes drain.     # Troponinemia  # History of prolonged QTc interval  # CHFrEF  Troponin elevated to his baseline, patient reports compliance with home metoprolol. Did receive 5 mg of IV metoprolol in the ER with heart rate remaining in the 130s.  Last echocardiogram on file from September 2020 with LVEF of 30%  - Continuous monitoring   - Daily weights, and measurement of ins and outs  - As above, fluids to be administered carefully to avoid fluid overload  - Continue lasix 40 mg daily  - Continue lisinopril 10 mg   - Continue spironolactone 25 mg daily  - Increase metoprolol SR to 100 mg daily     # Hyponatremia, resolved   # Hypokalemia, resolved  - Replete PRN  - CTM     # Elevated lipase - resolved  No evidence of pancreatitis on CT imaging.  156>>25 on 11/7.     # Microcytic anemia, mild  Likely iron deficient anemia versus anemia of chronic disease.  Stable.  -To follow on outpatient basis     # Diabetes mellitus type 2  Patient states blood sugar well controlled at home with metformin. Last a1c of 7.7 on 10/22/19  -Sliding scale insulin with hypoglycemia protocol  - Ensure strict glucose control for proper infection control     # UTI with dysuria  UA positive leukocyte esterase, negative nitrites.    - Continue Abx per ID    Lines: PIV  Tubes: BETTE drain  Abx: CTX, Metronixazole, Fluconazole, linezolid  DVT prophylaxis: SCDs  Code Status: Full    Disposition: Inpatient for resolution of perisplenic abscess    Andressa Peace MD   PGY-1 Family Medicine Resident   [facility]

## 2020-11-09 ENCOUNTER — ANESTHESIA EVENT (OUTPATIENT)
Dept: SURGERY | Facility: MEDICAL CENTER | Age: 55
DRG: 329 | End: 2020-11-09
Payer: MEDICAID

## 2020-11-09 LAB
ANION GAP SERPL CALC-SCNC: 9 MMOL/L (ref 7–16)
BASOPHILS # BLD AUTO: 1 % (ref 0–1.8)
BASOPHILS # BLD: 0.08 K/UL (ref 0–0.12)
BUN SERPL-MCNC: 31 MG/DL (ref 8–22)
CALCIUM SERPL-MCNC: 8.8 MG/DL (ref 8.5–10.5)
CHLORIDE SERPL-SCNC: 99 MMOL/L (ref 96–112)
CO2 SERPL-SCNC: 22 MMOL/L (ref 20–33)
COVID ORDER STATUS COVID19: NORMAL
CREAT SERPL-MCNC: 2.15 MG/DL (ref 0.5–1.4)
EOSINOPHIL # BLD AUTO: 0.36 K/UL (ref 0–0.51)
EOSINOPHIL NFR BLD: 4.3 % (ref 0–6.9)
ERYTHROCYTE [DISTWIDTH] IN BLOOD BY AUTOMATED COUNT: 55.2 FL (ref 35.9–50)
GLUCOSE BLD-MCNC: 123 MG/DL (ref 65–99)
GLUCOSE BLD-MCNC: 126 MG/DL (ref 65–99)
GLUCOSE BLD-MCNC: 129 MG/DL (ref 65–99)
GLUCOSE BLD-MCNC: 147 MG/DL (ref 65–99)
GLUCOSE SERPL-MCNC: 135 MG/DL (ref 65–99)
HCT VFR BLD AUTO: 38.8 % (ref 42–52)
HGB BLD-MCNC: 11.6 G/DL (ref 14–18)
IMM GRANULOCYTES # BLD AUTO: 0.05 K/UL (ref 0–0.11)
IMM GRANULOCYTES NFR BLD AUTO: 0.6 % (ref 0–0.9)
LYMPHOCYTES # BLD AUTO: 2.13 K/UL (ref 1–4.8)
LYMPHOCYTES NFR BLD: 25.7 % (ref 22–41)
MCH RBC QN AUTO: 25.6 PG (ref 27–33)
MCHC RBC AUTO-ENTMCNC: 29.9 G/DL (ref 33.7–35.3)
MCV RBC AUTO: 85.7 FL (ref 81.4–97.8)
MONOCYTES # BLD AUTO: 1.56 K/UL (ref 0–0.85)
MONOCYTES NFR BLD AUTO: 18.8 % (ref 0–13.4)
NEUTROPHILS # BLD AUTO: 4.11 K/UL (ref 1.82–7.42)
NEUTROPHILS NFR BLD: 49.6 % (ref 44–72)
NRBC # BLD AUTO: 0 K/UL
NRBC BLD-RTO: 0 /100 WBC
PLATELET # BLD AUTO: 353 K/UL (ref 164–446)
PMV BLD AUTO: 10.8 FL (ref 9–12.9)
POTASSIUM SERPL-SCNC: 5.6 MMOL/L (ref 3.6–5.5)
RBC # BLD AUTO: 4.53 M/UL (ref 4.7–6.1)
SARS-COV-2 RNA RESP QL NAA+PROBE: NOTDETECTED
SODIUM SERPL-SCNC: 130 MMOL/L (ref 135–145)
SPECIMEN SOURCE: NORMAL
WBC # BLD AUTO: 8.3 K/UL (ref 4.8–10.8)

## 2020-11-09 PROCEDURE — 700105 HCHG RX REV CODE 258: Performed by: STUDENT IN AN ORGANIZED HEALTH CARE EDUCATION/TRAINING PROGRAM

## 2020-11-09 PROCEDURE — 700102 HCHG RX REV CODE 250 W/ 637 OVERRIDE(OP): Performed by: STUDENT IN AN ORGANIZED HEALTH CARE EDUCATION/TRAINING PROGRAM

## 2020-11-09 PROCEDURE — 85025 COMPLETE CBC W/AUTO DIFF WBC: CPT

## 2020-11-09 PROCEDURE — 700102 HCHG RX REV CODE 250 W/ 637 OVERRIDE(OP): Performed by: INTERNAL MEDICINE

## 2020-11-09 PROCEDURE — U0003 INFECTIOUS AGENT DETECTION BY NUCLEIC ACID (DNA OR RNA); SEVERE ACUTE RESPIRATORY SYNDROME CORONAVIRUS 2 (SARS-COV-2) (CORONAVIRUS DISEASE [COVID-19]), AMPLIFIED PROBE TECHNIQUE, MAKING USE OF HIGH THROUGHPUT TECHNOLOGIES AS DESCRIBED BY CMS-2020-01-R: HCPCS

## 2020-11-09 PROCEDURE — 770020 HCHG ROOM/CARE - TELE (206)

## 2020-11-09 PROCEDURE — 99233 SBSQ HOSP IP/OBS HIGH 50: CPT | Performed by: INTERNAL MEDICINE

## 2020-11-09 PROCEDURE — A9270 NON-COVERED ITEM OR SERVICE: HCPCS | Performed by: STUDENT IN AN ORGANIZED HEALTH CARE EDUCATION/TRAINING PROGRAM

## 2020-11-09 PROCEDURE — 80048 BASIC METABOLIC PNL TOTAL CA: CPT

## 2020-11-09 PROCEDURE — C9803 HOPD COVID-19 SPEC COLLECT: HCPCS | Performed by: SURGERY

## 2020-11-09 PROCEDURE — A9270 NON-COVERED ITEM OR SERVICE: HCPCS | Performed by: INTERNAL MEDICINE

## 2020-11-09 PROCEDURE — 82962 GLUCOSE BLOOD TEST: CPT

## 2020-11-09 RX ORDER — FUROSEMIDE 20 MG/1
20 TABLET ORAL DAILY
Status: DISCONTINUED | OUTPATIENT
Start: 2020-11-10 | End: 2020-11-10

## 2020-11-09 RX ORDER — SODIUM CHLORIDE 9 MG/ML
INJECTION, SOLUTION INTRAVENOUS CONTINUOUS
Status: DISCONTINUED | OUTPATIENT
Start: 2020-11-09 | End: 2020-11-11

## 2020-11-09 RX ORDER — SODIUM CHLORIDE, SODIUM LACTATE, POTASSIUM CHLORIDE, CALCIUM CHLORIDE 600; 310; 30; 20 MG/100ML; MG/100ML; MG/100ML; MG/100ML
INJECTION, SOLUTION INTRAVENOUS CONTINUOUS
Status: DISCONTINUED | OUTPATIENT
Start: 2020-11-10 | End: 2020-11-09

## 2020-11-09 RX ORDER — SODIUM CHLORIDE 9 MG/ML
500 INJECTION, SOLUTION INTRAVENOUS ONCE
Status: COMPLETED | OUTPATIENT
Start: 2020-11-09 | End: 2020-11-10

## 2020-11-09 RX ADMIN — SODIUM CHLORIDE 500 ML: 9 INJECTION, SOLUTION INTRAVENOUS at 13:15

## 2020-11-09 RX ADMIN — OXYCODONE HYDROCHLORIDE AND ACETAMINOPHEN 2 TABLET: 5; 325 TABLET ORAL at 21:20

## 2020-11-09 RX ADMIN — OLANZAPINE 7.5 MG: 5 TABLET, FILM COATED ORAL at 20:26

## 2020-11-09 RX ADMIN — POTASSIUM CHLORIDE 40 MEQ: 1500 TABLET, EXTENDED RELEASE ORAL at 04:51

## 2020-11-09 RX ADMIN — FLUCONAZOLE 400 MG: 200 TABLET ORAL at 04:50

## 2020-11-09 RX ADMIN — LINEZOLID 600 MG: 600 TABLET, FILM COATED ORAL at 16:43

## 2020-11-09 RX ADMIN — SODIUM CHLORIDE: 9 INJECTION, SOLUTION INTRAVENOUS at 16:42

## 2020-11-09 RX ADMIN — METRONIDAZOLE 500 MG: 500 TABLET ORAL at 04:51

## 2020-11-09 RX ADMIN — LINEZOLID 600 MG: 600 TABLET, FILM COATED ORAL at 04:51

## 2020-11-09 RX ADMIN — SERTRALINE 125 MG: 100 TABLET, FILM COATED ORAL at 04:51

## 2020-11-09 ASSESSMENT — ENCOUNTER SYMPTOMS
SENSORY CHANGE: 0
COUGH: 0
DIARRHEA: 0
SPUTUM PRODUCTION: 0
NAUSEA: 0
ABDOMINAL PAIN: 1
CONSTIPATION: 0
VOMITING: 0
TREMORS: 0
SHORTNESS OF BREATH: 0
CHILLS: 0
FEVER: 0
MYALGIAS: 0

## 2020-11-09 ASSESSMENT — PAIN DESCRIPTION - PAIN TYPE
TYPE: ACUTE PAIN

## 2020-11-09 ASSESSMENT — FIBROSIS 4 INDEX: FIB4 SCORE: 1.12

## 2020-11-09 NOTE — NON-PROVIDER
[facility] FAMILY MEDICINE PROGRESS NOTE     Attending:   Dr. Ana Cristina Lindsay     Resident:   Jason Reyes M.D.    PATIENT:   Sebastián Castro; [id number]; 1965    ID:   55 y.o. male with PmHx of morbid obesity, CHF, and DM recently discharged 10/31 from [facility] [place] for splenic hematoma admitted for perisplenic abscess, bacteremia, and a-fib with RVR.     SUBJECTIVE:   No acute events overnight, but HR ranges from 110s-120s.  Reports nausea and dry heaving. Diarrhea is improving. Was able to eat a banana for breakfast but has very little appetite.  States LUQ pain comes and goes and is 2-3/10. He believes he is on too many BP meds as he can feel it fluctuating. Denies CP, SOB, palpitations, fever or chills.    OBJECTIVE:  Vitals:    11/08/20 2005 11/09/20 0027 11/09/20 0426 11/09/20 0805   BP: (!) 87/57 110/65 104/65 (!) 97/73   Pulse: (!) 118 (!) 106 (!) 118 (!) 129   Resp: 18 20 19 19   Temp: 36.3 °C (97.3 °F) 36.1 °C (97 °F) 36.9 °C (98.4 °F) 36.7 °C (98 °F)   TempSrc: Temporal Temporal Temporal Temporal   SpO2: 91% 93% 92% 93%   Weight: (!) 157 kg (346 lb 2 oz)      Height:           Intake/Output Summary (Last 24 hours) at 11/7/2020 0850  Last data filed at 11/6/2020 1758  Gross per 24 hour   Intake 1920 ml   Output 30 ml   Net 1890 ml       PHYSICAL EXAM:  General: No acute distress, afebrile, resting comfortably  Cardiovascular: +Irregularly irregular rhythm, tachycardic, normal S1/S2 no murmurs, rubs, or gallops.  Respiratory: CTAB, no tachypnea or retractions  Abdomen: soft,obese,+tender to mild palpation, normoactive BS. BETTE drain present on left side with dark brown fluid output. No guarding/rebound.    LABS:  Recent Labs     11/07/20  0029   WBC 9.4   RBC 5.02   HEMOGLOBIN 12.6*   HEMATOCRIT 43.1   MCV 85.9   MCH 25.1*   RDW 55.1*   PLATELETCT 245   MPV 10.5   NEUTSPOLYS 62.00   LYMPHOCYTES 21.40*   MONOCYTES 13.70*   EOSINOPHILS 1.80   BASOPHILS 0.60     Recent Labs     11/07/20  0315   SODIUM 130*   POTASSIUM 4.8    CHLORIDE 98   CO2 21   BUN 21   CREATININE 1.00   CALCIUM 8.6   ALBUMIN 2.3*     Estimated GFR/CRCL = Estimated Creatinine Clearance: 126.1 mL/min (by C-G formula based on SCr of 1 mg/dL).  Recent Labs     11/07/20  0315 11/07/20  0315 11/08/20  1644 11/09/20  0000 11/09/20  0558   GLUCOSE 146*  --   --   --   --    POCGLUCOSE  --    < > 123* 147* 129*    < > = values in this interval not displayed.     Recent Labs     11/07/20  0315   ASTSGOT 23   ALTSGPT 11   TBILIRUBIN 0.7   ALKPHOSPHAT 93   GLOBULIN 4.3*             No results for input(s): INR, APTT, FIBRINOGEN in the last 72 hours.    Invalid input(s): DIMER      IMAGING:  CT-ABDOMEN-PELVIS WITH  Addendum: Addendum:   A prior noncontrast CT of the abdomen and pelvis from [facility] dated 10/19/2020 was made available for comparison. On the prior study a perisplenic/subcapsular fluid collection was seen however  did not contain air at that time and there was no discrete evidence of communication with the transverse colon.  Communication with splenic flexure of the colon and air within the collection are new from the prior study.  Narrative: 11/3/2020 4:02 PM    Impression:   1.  Peripherally enhancing perisplenic fluid collection which communicates with the splenic flexure of the colon and which contains foci of air is most consistent with an abscess/infected fluid collection. There is nonenhancement of the anterior aspect of the spleen suggesting infection/infarction. Surgical consultation is recommended.  2.  Hepatomegaly and hepatic steatosis.  3.  Cholelithiasis.  4.  Right renal cortical scarring. No hydronephrosis.  5.  Small left pleural effusion. Mild bibasilar atelectasis.  6.  Subacute to chronic appearing T11 and T12 superior endplate compression fractures.      MEDS:  Current Facility-Administered Medications   Medication Last Admin   • ondansetron (ZOFRAN) syringe/vial injection 4 mg 4 mg at 11/08/20 1240   • influenza  vaccine quad injection 0.5 mL     • metoprolol SR (TOPROL XL) tablet 50 mg Stopped at 11/09/20 0600   • psyllium (METAMUCIL/KONSYL) 1 Packet 1 Packet at 11/08/20 1245   • linezolid (ZYVOX) tablet 600 mg 600 mg at 11/09/20 0451   • ondansetron (ZOFRAN ODT) dispertab 4 mg 4 mg at 11/08/20 0515   • fluconazole (DIFLUCAN) tablet 400 mg 400 mg at 11/09/20 0450   • potassium chloride SA (Kdur) tablet 40 mEq 40 mEq at 11/09/20 0451   • oxyCODONE-acetaminophen (PERCOCET) 5-325 MG per tablet 1-2 Tab 2 Tab at 11/08/20 1647   • furosemide (LASIX) tablet 40 mg Stopped at 11/09/20 0600   • lisinopril (PRINIVIL) tablet 10 mg Stopped at 11/09/20 0600   • OLANZapine (ZYPREXA) tablet 7.5 mg 7.5 mg at 11/08/20 2149   • sertraline (Zoloft) tablet 125 mg 125 mg at 11/09/20 0451   • spironolactone (ALDACTONE) tablet 25 mg Stopped at 11/09/20 0600   • insulin regular (HumuLIN R,NovoLIN R) injection Stopped at 11/06/20 1800    And   • glucose 4 g chewable tablet 16 g      And   • dextrose 50% (D50W) injection 50 mL     • labetalol (NORMODYNE/TRANDATE) injection 10 mg     • metroNIDAZOLE (FLAGYL) tablet 500 mg 500 mg at 11/09/20 0451   • cefTRIAXone (ROCEPHIN) 2 g in  mL IVPB Stopped at 11/08/20 1716       ASSESSMENT/PLAN:  55 y.o. male admitted with perisplenic abscess with fistula formation and gram positive bacteremia.     #Perisplenic abscess/LUQ pain/N/V/Subcapsular splenic hematoma  #Gram positive bacteremia   - 11/4 CT-guided BETTE drain placed, draining brown fluid.   - General surgery following; will need surgery as the abscess is communicating with the splenic flexure   - Continue C3, metronidazole, linezolid, and fluconazole per ID; meds may be changed with sensitivity report back       #A-fib w/ RVR  #hypotension  --129 overnight, BP 90s-110s. Continue metoprolol 50mg with hold for sys bp below 110  -Pt denies ever taking anticoagulation. Cardiology deemed him not a good candidate for anticoagulation due to falls.   -  Cardio consult recommends continuing with current treatment. Afib and hypotension will improve with treatment of his infection.  -Lasix dose decreased and possible D/C of spironolactone or lisinopril due to symptomatic hypotension      #Hyponatremia, resolved   #Hypokalemia, resolved    #+Troponin, elevated BNP, CHF  -Troponins elevated but at patient's baseline upon admission.  Echo earlier this year shows EF 30%. BNP elevated, continue  Furosemide, lisinopril, and spironolactone.  Strict I/Os, low Na diet.  -Troponins trending down without EKG changes or chest pain reported.    #DM  -Controlled on inpatient sliding scale. Resume home metformin once no further contrast imaging desire.    #Diarrhea  - reducing in frequency. C-diff stool toxin negative, + recent antibiotic use inpatient.       Dispo: Once cleared by general surgery, discharge back to home with PO abx and cardiology followup.

## 2020-11-09 NOTE — PROGRESS NOTES
[facility] PROGRESS NOTE     Attending:   Sara Duran MD    Resident:   Andressa Peace MD    PATIENT:   Sebastián Castro; [id number]; 1965    ID:   55 y.o. male recently discharged 10/31 from [facility] for splenic hematoma, admitted to [facility] for perisplenic abscess, a-fib with RVR    SUBJECTIVE:   Interval events: Patient hypotensive overnight into 80s systolic, but asymptomatic.     Patient's diarrhea improved. Abdominal pain also improved.     OBJECTIVE:  Vitals:    11/08/20 2005 11/09/20 0027 11/09/20 0426 11/09/20 0805   BP: (!) 87/57 110/65 104/65 (!) 97/73   Pulse: (!) 118 (!) 106 (!) 118 (!) 129   Resp: 18 20 19 19   Temp: 36.3 °C (97.3 °F) 36.1 °C (97 °F) 36.9 °C (98.4 °F) 36.7 °C (98 °F)   TempSrc: Temporal Temporal Temporal Temporal   SpO2: 91% 93% 92% 93%   Weight: (!) 157 kg (346 lb 2 oz)      Height:         No intake or output data in the 24 hours ending 11/08/20 1513    PHYSICAL EXAM:  General: No acute distress, afebrile, resting comfortably  HEENT: NC/AT. EOMI. MMM, neck supple without adenopathy  Cardiovascular: +Irregularly irregular rhythm, tachycardic, normal S1/S2 no murmurs rubs, or gallops, cap refill brisk.  Respiratory: CTAB, no tachypnea or retractions  Abdomen: soft,obese,+tender to mild palpation, normoactive BS. BETTE drain with cloudy brown output on left side. No guarding/rebound.  EXT:  +bruising on upper extremities. 1+ LE edema. No rashes or skin changes noted. Pulses 2+ DP and radial bilaterally  Neuro: Non-focal      LABS:  Recent Labs     11/07/20  0029   WBC 9.4   RBC 5.02   HEMOGLOBIN 12.6*   HEMATOCRIT 43.1   MCV 85.9   MCH 25.1*   RDW 55.1*   PLATELETCT 245   MPV 10.5   NEUTSPOLYS 62.00   LYMPHOCYTES 21.40*   MONOCYTES 13.70*   EOSINOPHILS 1.80   BASOPHILS 0.60     Recent Labs     11/07/20  0315   SODIUM 130*   POTASSIUM 4.8   CHLORIDE 98   CO2 21   BUN 21   CREATININE 1.00   CALCIUM 8.6   ALBUMIN 2.3*     Estimated GFR/CRCL = Estimated Creatinine Clearance: 126.1  mL/min (by C-G formula based on SCr of 1 mg/dL).  Recent Labs     11/07/20  0315 11/07/20  0315 11/08/20  1644 11/09/20  0000 11/09/20  0558   GLUCOSE 146*  --   --   --   --    POCGLUCOSE  --    < > 123* 147* 129*    < > = values in this interval not displayed.     Recent Labs     11/07/20  0315   ASTSGOT 23   ALTSGPT 11   TBILIRUBIN 0.7   ALKPHOSPHAT 93   GLOBULIN 4.3*             No results for input(s): INR, APTT, FIBRINOGEN in the last 72 hours.    Invalid input(s): DIMER    MICROBIOLOGY:   Blood Culture   Date Value Ref Range Status   03/15/2018 No growth after 5 days of incubation.  Final        IMAGING:   CT-ABDOMEN-PELVIS WITH   Final Result      Interval decrease in size of perisplenic abscess. Pigtail catheter now in place      Abscess has likely fistulous communication with the abutting splenic flexure of the colon. Colon is otherwise normal in appearance      Mild hepatosplenomegaly      Decreasing small left pleural effusion      Subacute anterior wedge compression fractures in the caudal thoracic spine      CT-DRAIN-PERITONEAL   Final Result      1.  CT guided perisplenic abscess catheter drainage.   2.  The current plan is to obtain a follow-up CT in 5-7 days..      OUTSIDE IMAGES-CT CHEST   Final Result      OUTSIDE IMAGES-CT ABDOMEN /PELVIS   Final Result      CT-ABDOMEN-PELVIS WITH   Final Result   Addendum 1 of 1   Addendum:   A prior noncontrast CT of the abdomen and pelvis from [facility] dated 10/19/2020 was made available for comparison. On the    prior study a perisplenic/subcapsular fluid collection was seen however    did not contain air at that time and    there was no discrete evidence of communication with the transverse colon.    Communication with splenic flexure of the colon and air within the    collection are new from the prior study.      Final      DX-CHEST-PORTABLE (1 VIEW)   Final Result      Slight interval interval improvement in congestive heart  "failure.          CULTURES:   Results     Procedure Component Value Units Date/Time    BLOOD CULTURE [[id number]] Collected: 11/03/20 1626    Order Status: Completed Specimen: Blood from Peripheral Updated: 11/08/20 1900     Significant Indicator NEG     Source BLD     Site PERIPHERAL     Culture Result No growth after 5 days of incubation.    Narrative:      Per Hospital Policy: Only change Specimen Src: to \"Line\" if  specified by physician order.  No site indicated    Aerobic/Anaerobic Culture (Surgery) [[id number]] Collected: 11/04/20 1730    Order Status: Completed Specimen: Wound from Peritoneal Fluid Updated: 11/08/20 0829     Significant Indicator NEG     Source WND     Site Peritoneal Drain     Culture Result Order placed in Surgery. Source appropriate culture orders  have been placed in Micro for testing.      Narrative:      Special Contact [id number] MAXINE BURKSANGELICA Stevenson fluid from peritoneal drain  Special Contact [id number] MAXINE ELLIS    Anaerobic Culture [[id number]] Collected: 11/04/20 1730    Order Status: Completed Specimen: Wound Updated: 11/08/20 0829     Significant Indicator NEG     Source WND     Site Peritoneal Drain     Culture Result No Anaerobes isolated.    Narrative:      Special Contact [id number] MAXINE BURKSANGELICA Stevenson fluid from peritoneal drain  Special Contact [id number] MAXINE BURKSONY RHONDA    CULTURE WOUND W/ GRAM STAIN [[id number]]  (Abnormal)  (Susceptibility) Collected: 11/04/20 1730    Order Status: Completed Specimen: Wound Updated: 11/08/20 0829     Significant Indicator POS     Source WND     Site Peritoneal Drain     Culture Result -     Gram Stain Result Rare WBCs.  Few Gram positive cocci.  Few Yeast.       Culture Result Enterococcus faecalis  Heavy growth        Candida albicans  Moderate growth      Narrative:      Special Contact [id number] MAXINE ELLIS  Graham fluid from peritoneal drain  Special Contact [id number] MAXINE ELLIS    " Susceptibility     Enterococcus faecalis (1)     Antibiotic Interpretation Method Status    Daptomycin Sensitive ISAMAR Final    Ampicillin Sensitive ISAMAR Final    Gent Synergy Sensitive ISAMAR Final    Vancomycin Sensitive ISAMAR Final    Penicillin Sensitive ISAMAR Final                   CULTURE STOOL [[id number]] Collected: 11/04/20 1609    Order Status: Completed Specimen: Stool Updated: 11/07/20 1608     Significant Indicator NEG     Source STL     Site STOOL     Culture Result Shiga Toxin testing not performed due to lack of growth in  [place] broth.  No enteric pathogens, only usual Gram positive enteric  joselin isolated.  NOTE:  Stool cultures are screened for Shiga Toxins 1 and 2,  Salmonella, Shigella, Campylobacter, Aeromonas,  Plesiomonas, and Vibrio.      Narrative:      Special Contact [id number] [facility]  Does this patient have risk factors for C-diff?->Yes  Special Contact [id number] [facility]    URINALYSIS [[id number]]  (Abnormal) Collected: 11/06/20 1235    Order Status: Completed Specimen: Urine, Clean Catch Updated: 11/06/20 1327     Color DK Yellow     Character Clear     Specific Gravity 1.019     Ph 5.0     Glucose Negative mg/dL      Ketones Negative mg/dL      Protein Negative mg/dL      Bilirubin Negative     Urobilinogen, Urine 0.2     Nitrite Negative     Leukocyte Esterase Trace     Occult Blood Negative     Micro Urine Req Microscopic    Narrative:      Collected By:[id number] JF MURDOCK    BLOOD CULTURE [[id number]]  (Abnormal) Collected: 11/03/20 1528    Order Status: Completed Specimen: Blood from Peripheral Updated: 11/06/20 1141     Significant Indicator POS     Source BLD     Site PERIPHERAL     Culture Result Growth detected by Bactec instrument.  11/04/2020  12:52      Viridans Streptococcus  Possible Contaminant  Isolated from one bottle only, correlate with clinical  condition.      Narrative:      CALL  [place]  183 tel. [phone],  CALLED  183 tel. [phone]  "11/04/2020, 12:59, RB PERF. RESULTS CALLED TO:  [id number], RN  Per Hospital Policy: Only change Specimen Src: to \"Line\" if  specified by physician order.  No site indicated    BLOOD CULTURE [[id number]] Collected: 11/05/20 1950    Order Status: Completed Specimen: Blood from Peripheral Updated: 11/06/20 1025     Significant Indicator NEG     Source BLD     Site PERIPHERAL     Culture Result No Growth  Note: Blood cultures are incubated for 5 days and  are monitored continuously.Positive blood cultures  are called to the RN and reported as soon as  they are identified.      Narrative:      Per Hospital Policy: Only change Specimen Src: to \"Line\" if  specified by physician order.  Right AC    BLOOD CULTURE [[id number]] Collected: 11/05/20 1950    Order Status: Completed Specimen: Blood from Peripheral Updated: 11/06/20 1025     Significant Indicator NEG     Source BLD     Site PERIPHERAL     Culture Result No Growth  Note: Blood cultures are incubated for 5 days and  are monitored continuously.Positive blood cultures  are called to the RN and reported as soon as  they are identified.      Narrative:      Per Hospital Policy: Only change Specimen Src: to \"Line\" if  specified by physician order.  Right Hand    URINE CULTURE(NEW) [[id number]] Collected: 11/03/20 1707    Order Status: Completed Specimen: Urine Updated: 11/06/20 0749     Significant Indicator NEG     Source UR     Site -     Culture Result Mixed skin joselin <10,000 cfu/mL    Narrative:      Indication for culture:->Emergency Room Patient  Indication for culture:->Emergency Room Patient    GRAM STAIN [[id number]] Collected: 11/04/20 1730    Order Status: Completed Specimen: Wound Updated: 11/05/20 0137     Significant Indicator .     Source WND     Site Peritoneal Drain     Gram Stain Result Rare WBCs.  Few Gram positive cocci.  Few Yeast.      Narrative:      Special Contact [id number] MAXINE Stevenson fluid from peritoneal drain  Special Contact " [id number] MAXINE ELLIS    C Diff by PCR rflx Toxin [[id number]] Collected: 11/04/20 1609    Order Status: Completed Specimen: Stool Updated: 11/04/20 2202     C Diff by PCR Negative     Comment: C. difficile NOT detected by PCR.  Treatment not indicated per guidelines.  Repeat testing not indicated within 7 days.          027-NAP1-BI Presumptive Negative     Comment: Presumptive 027/NAP1/BI target DNA sequences are NOT DETECTED.       Narrative:      Special Contact [id number] EMIL PRITCHARD  Does this patient have risk factors for C-diff?->Yes    FLUID CULTURE W/GRAM STAIN [[id number]] Collected: 11/04/20 1730    Order Status: Canceled Specimen: Other from Peritoneal Fluid     URINALYSIS [[id number]]  (Abnormal) Collected: 11/03/20 1707    Order Status: Completed Specimen: Urine Updated: 11/03/20 1745     Color DK Yellow     Character Cloudy     Specific Gravity 1.026     Ph 5.0     Glucose Negative mg/dL      Ketones Trace mg/dL      Protein 30 mg/dL      Bilirubin Small     Urobilinogen, Urine 1.0     Nitrite Negative     Leukocyte Esterase Trace     Occult Blood Negative     Micro Urine Req Microscopic    Narrative:      Indication for culture:->Emergency Room Patient    Routine (COVID/SARS COV-2 In-House PCR up to 24 hours) [[id number]]     Order Status: Completed Specimen: Respirate from Nasopharyngeal           MEDS:  Current Facility-Administered Medications   Medication Last Admin   • ondansetron (ZOFRAN) syringe/vial injection 4 mg 4 mg at 11/08/20 1240   • influenza vaccine quad injection 0.5 mL     • metoprolol SR (TOPROL XL) tablet 50 mg Stopped at 11/09/20 0600   • psyllium (METAMUCIL/KONSYL) 1 Packet 1 Packet at 11/08/20 1245   • linezolid (ZYVOX) tablet 600 mg 600 mg at 11/09/20 0451   • ondansetron (ZOFRAN ODT) dispertab 4 mg 4 mg at 11/08/20 0515   • fluconazole (DIFLUCAN) tablet 400 mg 400 mg at 11/09/20 0450   • potassium chloride SA (Kdur) tablet 40 mEq 40 mEq at 11/09/20 0451   •  oxyCODONE-acetaminophen (PERCOCET) 5-325 MG per tablet 1-2 Tab 2 Tab at 11/08/20 1647   • furosemide (LASIX) tablet 40 mg Stopped at 11/09/20 0600   • lisinopril (PRINIVIL) tablet 10 mg Stopped at 11/09/20 0600   • OLANZapine (ZYPREXA) tablet 7.5 mg 7.5 mg at 11/08/20 2149   • sertraline (Zoloft) tablet 125 mg 125 mg at 11/09/20 0451   • spironolactone (ALDACTONE) tablet 25 mg Stopped at 11/09/20 0600   • insulin regular (HumuLIN R,NovoLIN R) injection Stopped at 11/06/20 1800    And   • glucose 4 g chewable tablet 16 g      And   • dextrose 50% (D50W) injection 50 mL     • labetalol (NORMODYNE/TRANDATE) injection 10 mg         PROBLEM LIST:  No problems updated.    ASSESSMENT/PLAN: 55 y.o. male admitted for perisplenic abscess and bacteremia. Followed by ID and general surgery.    # Perisplenic abscess s/p BETTE drain, growing Enterococcus faecalis and candida albicans  # Gram Positive cocci bacteremia with strep  40mL output of BETTE drain, murky brown color  CTAP 11/8 shows interval decrease in size of perisplenic abscess; likely has fistulous communication with abutting splenic flexure of colon.   Wound culture enterococcus faecalis sensitive to Ampicillin, Daptomycin, Gent Synergy, Penicillin, and Vancomycin  - General surgery following; follow up with them today to see what operative plan is  - ID following   -DC ceftriaxone 11/9 as does not treat enterococcus and no GNR found   -Continue linezolid and fluconazole   - Patient with allergies to daptomycin and penicillin.      # Diarrhea, likely 2/2 Abx  Recent abx use, recent hospitalization.  C. diff negative. Continue to monitor.  Stool culture negative  - Hold immodium     # Atrial fibrillation with rapid ventricular response  Was not on anticoagulation at home, and he does not know why. CHADSVASC of 3. Does not appear he has been following up with cardiology regularly, though he has seen a cardiologist.   Cardiology consulted 11/8 and explained that increased HR  is physiological compensation to maintain adequate cardiac output in the setting of infection with CHF. Patient has reduced EF, and tachycardia is natural response to infection. For now, he's hemodynamically stable.  Seen by cardiac electrophysiologist in 2019 and deemed not to be good candidate for anticoagulation due to frequent falls 2/2 orthostatic hypotension.  - Appreciate cardiology recs to continue Metoprolol 50mg daily, spironolactone, and lisinopril     - recommend repeat future limited transthoracic echocardiogram to accurately  assess LV function once sepsis / bacteremia / infection resolves   - Consider anticoagulation after surgery removes drain.     # Troponinemia  # History of prolonged QTc interval  # CHFrEF  # New onset hypotension  Troponin elevated to his baseline, patient reports compliance with home metoprolol. Did receive 5 mg of IV metoprolol in the ER with heart rate remaining in the 130s.  Last echocardiogram on file from September 2020 with LVEF of 30%  EKG showed QT of 496  Patient persistently hypotensive, to as low as 80s systolic. Asymptomatic. Hx of orthostatic hypotension.   - Use medications that prolong QT with caution (Zofran, Abx)  - Holding Lisinopril and spironolactone  - Reduced lasix to 20 mg daily with hold parameters     # Hyponatremia, resolved   # Hypokalemia, resolved  - Replete PRN  - CTM     # Elevated lipase - resolved  No evidence of pancreatitis on CT imaging.  156>>25 on 11/7.     # Microcytic anemia, mild  Likely iron deficient anemia versus anemia of chronic disease.  Stable.  -To follow on outpatient basis     # Diabetes mellitus type 2  Patient states blood sugar well controlled at home with metformin. Last a1c of 7.7 on 10/22/19  POC glucose 100s - 140s.  -Sliding scale insulin with hypoglycemia protocol  - Ensure strict glucose control for proper infection control, <180     # UTI with dysuria  UA positive leukocyte esterase, negative nitrites.    - Continue  Abx per ID    Lines: PIV  Tubes: BETTE drain  Abx:  Fluconazole, linezolid  DVT prophylaxis: SCDs  Code Status: Full    Disposition: Inpatient for resolution of perisplenic abscess    Andressa Peace MD   PGY-1 Family Medicine Resident   [facility]

## 2020-11-09 NOTE — PROGRESS NOTES
Received report from Sharon PÉREZ, at pt bedside.  POC discussed. Call light and belongings within reach. Bed locked and in low position. Alarm on and fall precautions in place.

## 2020-11-09 NOTE — PROGRESS NOTES
[facility], Dr. Collins notified of patient's blood pressure of 83/53. Dr. Collins will put in orders.

## 2020-11-09 NOTE — DISCHARGE PLANNING
Anticipated Discharge Disposition: TBD    Action: RN BRANDON spoke with pt at bedside. Pt states he was previously living at a group home ([address] [facility]) run by Pretty ([phone]) but was told he can no longer go back there.  CM contacted Pretty to verify this information and Pretty confirmed pt is unable to return due to his inability to climb stairs and lack of independence with ADLs. Pretty reported pt's rep payee Karol was working on finding pt a new group home and provided this CM with Karol's contact number ([phone]).  Pt gave permission for CM to speak with her regarding his discharge plan.    CM spoke with Karol who stated she found a new single story group home ([facility], [facility]) and connected this CM with the owner Matilde ([phone]).  Matilde requested to know when pt would be discharged as she can only hold bed for a short period of time.  Care coordination advised Matilde I would need pt's permission prior to releasing information; Matilde verbalized understanding.    CM updated pt on his group home situation; pt verbalized understanding. Pt gave permission for care coordination to update Karol and Matilde on his discharge plan.      Barriers to Discharge: Medical clearance   PT OT evaluation    Plan: Coordinate discharge with pt's rep payee if pt is to discharge home.

## 2020-11-09 NOTE — PROGRESS NOTES
Infectious Disease Progress Note    Author: Cristy Holley M.D. Date & Time of service: 2020  2:31 PM    Chief Complaint:  Intrabdominal abscess and bacteremia    Interval History:   55 y.o. diabetic male admitted 11/3/2020.+ A. fib, CHD, and severe morbid obesity.  Seen by ID in the past for right foot abscess with cultures positive for MRSA and Proteus.  s/p GLF approximately 1 month ago and was admitted to [facility] with left upper quadrant pain.  CT at the time revealed a subcapsular splenic hematoma. Presented to ED complaining of abdominal pain described as 4/5 out of 10 dull and constant located in the left upper quadrant.     AF WBC 9.4 Less pain in side but remains tender to touch. Denies SE Zyvox-counseled about sxs serotonin syndrome     Review of Systems:  Review of Systems   Constitutional: Negative for chills and fever.   Respiratory: Negative for cough, sputum production and shortness of breath.    Gastrointestinal: Positive for abdominal pain. Negative for constipation, diarrhea, nausea and vomiting.   Musculoskeletal: Negative for myalgias.   Neurological: Negative for tremors and sensory change.       Hemodynamics:  Temp (24hrs), Av.5 °C (97.7 °F), Min:36 °C (96.8 °F), Max:37 °C (98.6 °F)  Temperature: 36 °C (96.8 °F)  Pulse  Av.6  Min: 62  Max: 152   Blood Pressure: (!) 83/53(Rn notified )       Physical Exam:  Physical Exam  Vitals signs and nursing note reviewed.   Constitutional:       General: He is not in acute distress.     Appearance: He is obese. He is ill-appearing. He is not diaphoretic.   HENT:      Nose: No rhinorrhea.      Mouth/Throat:      Pharynx: No oropharyngeal exudate.   Eyes:      General: No scleral icterus.     Extraocular Movements: Extraocular movements intact.      Pupils: Pupils are equal, round, and reactive to light.   Cardiovascular:      Rate and Rhythm: Tachycardia present. Rhythm irregular.   Pulmonary:      Effort: Pulmonary effort is  normal. No respiratory distress.      Comments: Decreased BS  Abdominal:      General: There is no distension.      Palpations: Abdomen is soft.      Tenderness: There is abdominal tenderness. There is no guarding.      Comments: LUQ ttp, drain in place with purulent fluid  Decreased erythema   Musculoskeletal:      Right lower leg: Edema present.      Left lower leg: Edema present.   Skin:     General: Skin is warm.      Coloration: Skin is not jaundiced.      Findings: Bruising and erythema present.   Neurological:      General: No focal deficit present.      Mental Status: He is alert and oriented to person, place, and time.   Psychiatric:         Mood and Affect: Mood normal.         Behavior: Behavior normal.         Meds:    Current Facility-Administered Medications:   •  [START ON 11/10/2020] furosemide  •  ondansetron  •  influenza vaccine quad  •  metoprolol SR  •  psyllium  •  linezolid  •  ondansetron  •  fluconazole  •  potassium chloride SA  •  oxyCODONE-acetaminophen  •  [Held by provider] lisinopril  •  OLANZapine  •  sertraline  •  [Held by provider] spironolactone  •  insulin regular **AND** POC Blood Glucose **AND** NOTIFY MD and PharmD **AND** glucose **AND** dextrose 50%  •  labetalol    Labs:  Recent Labs     11/07/20  0029   WBC 9.4   RBC 5.02   HEMOGLOBIN 12.6*   HEMATOCRIT 43.1   MCV 85.9   MCH 25.1*   RDW 55.1*   PLATELETCT 245   MPV 10.5   NEUTSPOLYS 62.00   LYMPHOCYTES 21.40*   MONOCYTES 13.70*   EOSINOPHILS 1.80   BASOPHILS 0.60     Recent Labs     11/07/20  0315   SODIUM 130*   POTASSIUM 4.8   CHLORIDE 98   CO2 21   GLUCOSE 146*   BUN 21     Recent Labs     11/07/20  0315   ALBUMIN 2.3*   TBILIRUBIN 0.7   ALKPHOSPHAT 93   TOTPROTEIN 6.6   ALTSGPT 11   ASTSGOT 23   CREATININE 1.00       Imaging:  Ct-abdomen-pelvis With    Addendum Date: 11/5/2020    Addendum: A prior noncontrast CT of the abdomen and pelvis from [facility] dated 10/19/2020 was made available for  comparison. On the prior study a perisplenic/subcapsular fluid collection was seen however did not contain air at that time and there was no discrete evidence of communication with the transverse colon. Communication with splenic flexure of the colon and air within the collection are new from the prior study.    Result Date: 11/5/2020  11/3/2020 4:02 PM HISTORY/REASON FOR EXAM:  Abd pain, unspecified; IV contrast only. TECHNIQUE/EXAM DESCRIPTION:   CT scan of the abdomen and pelvis with contrast. Contrast-enhanced helical scanning was obtained from the diaphragmatic domes through the pubic symphysis following the bolus administration of nonionic contrast without complication. 100 mL of Omnipaque 350 nonionic contrast was administered without complication. Low dose optimization technique was utilized for this CT exam including automated exposure control and adjustment of the mA and/or kV according to patient size. COMPARISON: 9/26/2018 FINDINGS: Chest Base: Small left pleural effusion with mild bibasilar atelectasis. Heart is mildly enlarged. Liver:  Diffuse hypoattenuation of the liver suggesting fatty infiltration. Moderately enlarged. Gallbladder: Cholelithiasis. Biliary tract: Nondilated. Pancreas: Normal. Spleen: There is a rim-enhancing perisplenic fluid collection which contains air and which communicates with the splenic flexure of the colon. This most likely represents an abscess and measures about 8.7 x 7.9 cm. The anterior aspect of the spleen is nonenhancing with irregular margins could be related to infection of splenic parenchyma or infarction. Adrenals: Normal. Kidneys and Collecting Systems:  There is mild right renal cortical scarring. No hydronephrosis. No renal mass. Gastrointestinal tract:  No bowel obstruction. The appendix is normal. Peritoneum: No free air or free fluid. Reproductive organs:  Normal. Bladder:  Normal. Vessels:  Normal caliber. Lymph  Nodes:  No lymphadenopathy. Abdominal wall:  Within normal limits. Bones:  Age indeterminate possibly subacute to chronic T11 and T12 superior endplate compression fractures.     1.  Peripherally enhancing perisplenic fluid collection which communicates with the splenic flexure of the colon and which contains foci of air is most consistent with an abscess/infected fluid collection. There is nonenhancement of the anterior aspect of the spleen suggesting infection/infarction. Surgical consultation is recommended. 2.  Hepatomegaly and hepatic steatosis. 3.  Cholelithiasis. 4.  Right renal cortical scarring. No hydronephrosis. 5.  Small left pleural effusion. Mild bibasilar atelectasis. 6.  Subacute to chronic appearing T11 and T12 superior endplate compression fractures. These findings were discussed with SVETLANA PAREDES on 11/3/2020 4:49 PM.     Ct-drain-peritoneal    Result Date: 11/4/2020 11/4/2020 5:19 PM HISTORY/REASON FOR EXAM:  Joya Splenic Fluid Collection TECHNIQUE/EXAM DESCRIPTION: LUQ parasplenic abscess drainage with CT guidance. Low dose optimization technique was utilized for this CT exam including automated exposure control and adjustment of the mA and/or kV according to patient size. PROCEDURE: Informed consent was obtained. SEDATION: Moderate sedation was provided. Pulse oximetry and continuous cardiac monitoring by the nurse was performed throughout the exam. Intraservice time was 30 minutes. Localizing CT images were obtained with the patient in supine position. The skin was prepped with Betadine and draped in a sterile fashion. Following local anesthesia with 1% Lidocaine, a 17 G guiding needle was placed and needle path confirmed with CT. An Amplatz guidewire was placed and following serial tract dilatation, a 10 Serbian pigtail locking catheter was placed. A specimen was collected and submitted for culture and sensitivity and Gram stain. Total of 6 mL of Brownish fluid was drained. The catheter was secured to the skin and connected to  "suction bulb drainage. Final CT images were obtained documenting catheter position. The patient tolerated the procedure well with no evidence of complication. COMPARISON: CT scan abdomen and pelvis 11/3/2020 FINDINGS: The final CT images show satisfactory catheter position within the target collection.     1.  CT guided perisplenic abscess catheter drainage. 2.  The current plan is to obtain a follow-up CT in 5-7 days..    Dx-chest-portable (1 View)    Result Date: 11/3/2020  11/3/2020 3:41 PM HISTORY/REASON FOR EXAM:  Chest Pain. TECHNIQUE/EXAM DESCRIPTION AND NUMBER OF VIEWS: Single portable view of the chest. COMPARISON: September 10, 2020 FINDINGS: The cardiac size is enlarged. There is interstitial prominence. Lung aeration has improved. No pneumothorax. No obvious pleural effusion.     Slight interval interval improvement in congestive heart failure.      Micro:  Results     Procedure Component Value Units Date/Time    BLOOD CULTURE [[id number]] Collected: 11/03/20 1626    Order Status: Completed Specimen: Blood from Peripheral Updated: 11/08/20 1900     Significant Indicator NEG     Source BLD     Site PERIPHERAL     Culture Result No growth after 5 days of incubation.    Narrative:      Per Hospital Policy: Only change Specimen Src: to \"Line\" if  specified by physician order.  No site indicated    Aerobic/Anaerobic Culture (Surgery) [[id number]] Collected: 11/04/20 1730    Order Status: Completed Specimen: Wound from Peritoneal Fluid Updated: 11/08/20 0829     Significant Indicator NEG     Source WND     Site Peritoneal Drain     Culture Result Order placed in Surgery. Source appropriate culture orders  have been placed in Micro for testing.      Narrative:      Special Contact [id number] MAXINE ELLIS  Brown fluid from peritoneal drain  Special Contact [id number] MAXINE ELLIS    Anaerobic Culture [[id number]] Collected: 11/04/20 1730    Order Status: Completed Specimen: Wound Updated: 11/08/20 " 0829     Significant Indicator NEG     Source WND     Site Peritoneal Drain     Culture Result No Anaerobes isolated.    Narrative:      Special Contact [id number] MAXINE ELLIS  Brown fluid from peritoneal drain  Special Contact [id number] MAXINE ELLIS    CULTURE WOUND W/ GRAM STAIN [[id number]]  (Abnormal)  (Susceptibility) Collected: 11/04/20 1730    Order Status: Completed Specimen: Wound Updated: 11/08/20 0829     Significant Indicator POS     Source WND     Site Peritoneal Drain     Culture Result -     Gram Stain Result Rare WBCs.  Few Gram positive cocci.  Few Yeast.       Culture Result Enterococcus faecalis  Heavy growth        Candida albicans  Moderate growth      Narrative:      Special Contact [id number] MAXINE Stevenson fluid from peritoneal drain  Special Contact [id number] MAXINE ELLIS    Susceptibility     Enterococcus faecalis (1)     Antibiotic Interpretation Microscan Method Status    Daptomycin Sensitive 4 mcg/mL ISAMAR Final    Ampicillin Sensitive <=2 mcg/mL ISAMAR Final    Gent Synergy Sensitive <=500 mcg/mL ISAMAR Final    Vancomycin Sensitive 1 mcg/mL ISAMAR Final    Penicillin Sensitive 2 mcg/mL ISAMAR Final                   CULTURE STOOL [[id number]] Collected: 11/04/20 1609    Order Status: Completed Specimen: Stool Updated: 11/07/20 1608     Significant Indicator NEG     Source STL     Site STOOL     Culture Result Shiga Toxin testing not performed due to lack of growth in  [place] broth.  No enteric pathogens, only usual Gram positive enteric  joselin isolated.  NOTE:  Stool cultures are screened for Shiga Toxins 1 and 2,  Salmonella, Shigella, Campylobacter, Aeromonas,  Plesiomonas, and Vibrio.      Narrative:      Special Contact [id number] [facility]  Does this patient have risk factors for C-diff?->Yes  Special Contact [id number] EMIL FRANCHESKA    URINALYSIS [[id number]]  (Abnormal) Collected: 11/06/20 1235    Order Status: Completed  "Specimen: Urine, Clean Catch Updated: 11/06/20 1327     Color DK Yellow     Character Clear     Specific Gravity 1.019     Ph 5.0     Glucose Negative mg/dL      Ketones Negative mg/dL      Protein Negative mg/dL      Bilirubin Negative     Urobilinogen, Urine 0.2     Nitrite Negative     Leukocyte Esterase Trace     Occult Blood Negative     Micro Urine Req Microscopic    Narrative:      Collected By:[id number] JF ROCKWELLIE MATHIEU    BLOOD CULTURE [[id number]]  (Abnormal) Collected: 11/03/20 1528    Order Status: Completed Specimen: Blood from Peripheral Updated: 11/06/20 1141     Significant Indicator POS     Source BLD     Site PERIPHERAL     Culture Result Growth detected by Bactec instrument.  11/04/2020  12:52      Viridans Streptococcus  Possible Contaminant  Isolated from one bottle only, correlate with clinical  condition.      Narrative:      CALL  [place]  183 tel. [phone],  CALLED  183 tel. [phone] 11/04/2020, 12:59, RB PERF. RESULTS CALLED TO:  [id number], RN  Per Hospital Policy: Only change Specimen Src: to \"Line\" if  specified by physician order.  No site indicated    BLOOD CULTURE [[id number]] Collected: 11/05/20 1950    Order Status: Completed Specimen: Blood from Peripheral Updated: 11/06/20 1025     Significant Indicator NEG     Source BLD     Site PERIPHERAL     Culture Result No Growth  Note: Blood cultures are incubated for 5 days and  are monitored continuously.Positive blood cultures  are called to the RN and reported as soon as  they are identified.      Narrative:      Per Hospital Policy: Only change Specimen Src: to \"Line\" if  specified by physician order.  Right AC    BLOOD CULTURE [[id number]] Collected: 11/05/20 1950    Order Status: Completed Specimen: Blood from Peripheral Updated: 11/06/20 1025     Significant Indicator NEG     Source BLD     Site PERIPHERAL     Culture Result No Growth  Note: Blood cultures are incubated for 5 days and  are monitored continuously.Positive blood cultures  are " "called to the RN and reported as soon as  they are identified.      Narrative:      Per Hospital Policy: Only change Specimen Src: to \"Line\" if  specified by physician order.  Right Hand    URINE CULTURE(NEW) [[id number]] Collected: 11/03/20 1707    Order Status: Completed Specimen: Urine Updated: 11/06/20 0749     Significant Indicator NEG     Source UR     Site -     Culture Result Mixed skin joselin <10,000 cfu/mL    Narrative:      Indication for culture:->Emergency Room Patient  Indication for culture:->Emergency Room Patient    GRAM STAIN [[id number]] Collected: 11/04/20 1730    Order Status: Completed Specimen: Wound Updated: 11/05/20 0137     Significant Indicator .     Source WND     Site Peritoneal Drain     Gram Stain Result Rare WBCs.  Few Gram positive cocci.  Few Yeast.      Narrative:      Special Contact [id number] MAXINE ELLIS  Brown fluid from peritoneal drain  Special Contact [id number] MAXINE ELLIS    C Diff by PCR rflx Toxin [[id number]] Collected: 11/04/20 1609    Order Status: Completed Specimen: Stool Updated: 11/04/20 2202     C Diff by PCR Negative     Comment: C. difficile NOT detected by PCR.  Treatment not indicated per guidelines.  Repeat testing not indicated within 7 days.          027-NAP1-BI Presumptive Negative     Comment: Presumptive 027/NAP1/BI target DNA sequences are NOT DETECTED.       Narrative:      Special Contact [id number] EMIL PRITCHARD  Does this patient have risk factors for C-diff?->Yes    FLUID CULTURE W/GRAM STAIN [[id number]] Collected: 11/04/20 1730    Order Status: Canceled Specimen: Other from Peritoneal Fluid     URINALYSIS [[id number]]  (Abnormal) Collected: 11/03/20 1707    Order Status: Completed Specimen: Urine Updated: 11/03/20 1745     Color DK Yellow     Character Cloudy     Specific Gravity 1.026     Ph 5.0     Glucose Negative mg/dL      Ketones Trace mg/dL      Protein 30 mg/dL      Bilirubin Small     Urobilinogen, Urine 1.0     " Nitrite Negative     Leukocyte Esterase Trace     Occult Blood Negative     Micro Urine Req Microscopic    Narrative:      Indication for culture:->Emergency Room Patient    Routine (COVID/SARS COV-2 In-House PCR up to 24 hours) [[id number]]     Order Status: Completed Specimen: Respirate from Nasopharyngeal         Interval 24 hours:      AF, O2 RA  Labs reviewed  Micro reviewed  Drain output 30 cc so far    Patient is reporting some ongoing nausea, no vomiting and is still quite tender in the left upper quadrant.  Drain in place continues to have output.  Will change antibiotics based on new culture results as below.      ASSESSMENT/PLAN:     Hospital Course:   CT scan on 11/3 revealed a peripherally enhancing perisplenic fluid collection measuring 8.7 x 7.9 cm, communicating with the splenic flexure of the colon and contained foci of air consistent with abscess/infected fluid.  Also noted nonenhancing of the anterior aspect of the spleen suggesting infection/infarction.  Surgery was consulted and recommended IR drainage of the fluid collection.  Today he reports that his abdominal pain has improved since drainage.    Bacteremia  -Blood cultures on 11/31/2 + viridans streptococci, possible contaminant  -Blood culture on 11/5 with no growth to date    Perisplenic abscess with possible communication to the bowel  Afebrile  No current leukocytosis  Splenic enhancement on CT, suggesting infection or infarction  -Drainage on 11/4 and peritoneal fluid Enterococcus faecalis, ampicillin sensitive and  Calbicans  Recent ground-level fall located by a subcapsular splenic hematoma  DC ceftriaxone as does not treat enterococcus and no GNR found  Continue zyvox and fluconazole  Follow drain output and repeat CT a/p in 5-7 days  Monitor CBC and for s/sxs serotonin syndrome    Diabetes  Keep BS under 150 to help control current infection    A. Fib  Contributing to hypotension  Cards following     Diarrhea  -C. difficile tested  negative on 11/4    Antibiotic allergies: Daptomycin rash, penicillins rash and hives, tolerates cephalosporins-discussed allergies and he states that he has developed significant rash and hives with penicillins several times, most recently a year and a half ago- will avoid penicillins

## 2020-11-09 NOTE — PROGRESS NOTES
Surgery    Colon perforation with abscess s/p drainage in IR  WBC down  Afeb  Taking some PO  Pain continues    BP (!) 83/53 Comment: Rn notified   Pulse 100   Temp 36 °C (96.8 °F) (Temporal)   Resp 19   Ht 1.829 m (6')   Wt (!) 157 kg (346 lb 2 oz)   SpO2 93%   BMI 46.94 kg/m²     Drain:    Abd very obese, soft  Drain with tess stool.   Bulb not holding suction    Recent Labs     11/07/20  0029 11/09/20  1337   WBC 9.4 8.3   RBC 5.02 4.53*   HEMOGLOBIN 12.6* 11.6*   HEMATOCRIT 43.1 38.8*   MCV 85.9 85.7   MCH 25.1* 25.6*   RDW 55.1* 55.2*   PLATELETCT 245 353   MPV 10.5 10.8   NEUTSPOLYS 62.00 49.60   LYMPHOCYTES 21.40* 25.70   MONOCYTES 13.70* 18.80*   EOSINOPHILS 1.80 4.30   BASOPHILS 0.60 1.00     CT reviewed: Wall defect at splenic flexure, drain in abscess cavity    A/P  LUQ abscess due to colon perforation of uncertain etiology  No improvement since admit  Needs colectomy, will try and avoid an ostomy, but that might be needed as well    Discussed with patient including risk of recurrent abscess, anastomotic leak, wound infection, hernia, bleeding, spleen injury and open wound manage ment with VAC    NPO after MN  Type and cross  Consent ordered  Last covid on 11/6

## 2020-11-09 NOTE — CARE PLAN
Problem: Safety  Goal: Will remain free from falls  Outcome: PROGRESSING AS EXPECTED  Intervention: Implement fall precautions  Flowsheets  Taken 11/9/2020 1438 by Doris Martin R.N.  Bed Alarm: Yes - Alarm On  Bedrails: Bedrails Closest to Bathroom Down  Taken 11/9/2020 0815 by Doris Martin R.N.  Environmental Precautions:   Treaded Slipper Socks on Patient   Personal Belongings, Wastebasket, Call Bell etc. in Easy Reach   Transferred to Stronger Side   Report Given to Other Health Care Providers Regarding Fall Risk   Bed in Low Position   Communication Sign for Patients & Families   Mobility Assessed & Appropriate Sign Placed  Taken 11/8/2020 1940 by Edith Turk R.N.  Chair/Bed Strip Alarm: Yes - Alarm On     Problem: Mobility  Goal: Risk for activity intolerance will decrease  Outcome: PROGRESSING AS EXPECTED  Intervention: Encourage patient to increase activity level in collaboration with Interdisciplinary Team  Note: Patient encourage to increase ambulation, sit up in chair for all meals, and walk to bathroom. Verbalized understanding.

## 2020-11-10 ENCOUNTER — ANESTHESIA (OUTPATIENT)
Dept: SURGERY | Facility: MEDICAL CENTER | Age: 55
DRG: 329 | End: 2020-11-10
Payer: MEDICAID

## 2020-11-10 ENCOUNTER — APPOINTMENT (OUTPATIENT)
Dept: RADIOLOGY | Facility: MEDICAL CENTER | Age: 55
DRG: 329 | End: 2020-11-10
Attending: SURGERY
Payer: MEDICAID

## 2020-11-10 PROBLEM — E27.40 ADRENAL INSUFFICIENCY (HCC): Status: ACTIVE | Noted: 2020-11-10

## 2020-11-10 PROBLEM — K63.1 SPONTANEOUS PERFORATION OF COLON (HCC): Status: ACTIVE | Noted: 2020-11-10

## 2020-11-10 LAB
ABO + RH BLD: NORMAL
ABO GROUP BLD: NORMAL
ALBUMIN SERPL BCP-MCNC: 2.2 G/DL (ref 3.2–4.9)
ALBUMIN/GLOB SERPL: 0.6 G/DL
ALP SERPL-CCNC: 88 U/L (ref 30–99)
ALT SERPL-CCNC: 10 U/L (ref 2–50)
ANION GAP SERPL CALC-SCNC: 11 MMOL/L (ref 7–16)
ANION GAP SERPL CALC-SCNC: 17 MMOL/L (ref 7–16)
ANION GAP SERPL CALC-SCNC: 8 MMOL/L (ref 7–16)
AST SERPL-CCNC: 17 U/L (ref 12–45)
BACTERIA BLD CULT: NORMAL
BACTERIA BLD CULT: NORMAL
BASOPHILS # BLD AUTO: 0.6 % (ref 0–1.8)
BASOPHILS # BLD AUTO: 1 % (ref 0–1.8)
BASOPHILS # BLD: 0.06 K/UL (ref 0–0.12)
BASOPHILS # BLD: 0.09 K/UL (ref 0–0.12)
BILIRUB SERPL-MCNC: 0.5 MG/DL (ref 0.1–1.5)
BLD GP AB SCN SERPL QL: NORMAL
BUN SERPL-MCNC: 29 MG/DL (ref 8–22)
BUN SERPL-MCNC: 29 MG/DL (ref 8–22)
BUN SERPL-MCNC: 31 MG/DL (ref 8–22)
CALCIUM SERPL-MCNC: 8.4 MG/DL (ref 8.5–10.5)
CALCIUM SERPL-MCNC: 8.6 MG/DL (ref 8.5–10.5)
CALCIUM SERPL-MCNC: 8.8 MG/DL (ref 8.5–10.5)
CHLORIDE SERPL-SCNC: 100 MMOL/L (ref 96–112)
CHLORIDE SERPL-SCNC: 100 MMOL/L (ref 96–112)
CHLORIDE SERPL-SCNC: 101 MMOL/L (ref 96–112)
CO2 SERPL-SCNC: 16 MMOL/L (ref 20–33)
CO2 SERPL-SCNC: 19 MMOL/L (ref 20–33)
CO2 SERPL-SCNC: 19 MMOL/L (ref 20–33)
CORTIS SERPL-MCNC: 17 UG/DL (ref 0–23)
CREAT SERPL-MCNC: 1.84 MG/DL (ref 0.5–1.4)
CREAT SERPL-MCNC: 1.95 MG/DL (ref 0.5–1.4)
CREAT SERPL-MCNC: 2.09 MG/DL (ref 0.5–1.4)
EOSINOPHIL # BLD AUTO: 0.13 K/UL (ref 0–0.51)
EOSINOPHIL # BLD AUTO: 0.21 K/UL (ref 0–0.51)
EOSINOPHIL NFR BLD: 0.9 % (ref 0–6.9)
EOSINOPHIL NFR BLD: 3.5 % (ref 0–6.9)
ERYTHROCYTE [DISTWIDTH] IN BLOOD BY AUTOMATED COUNT: 55.8 FL (ref 35.9–50)
ERYTHROCYTE [DISTWIDTH] IN BLOOD BY AUTOMATED COUNT: 57.3 FL (ref 35.9–50)
EST. AVERAGE GLUCOSE BLD GHB EST-MCNC: 177 MG/DL
GLOBULIN SER CALC-MCNC: 3.9 G/DL (ref 1.9–3.5)
GLUCOSE BLD-MCNC: 103 MG/DL (ref 65–99)
GLUCOSE BLD-MCNC: 106 MG/DL (ref 65–99)
GLUCOSE BLD-MCNC: 112 MG/DL (ref 65–99)
GLUCOSE BLD-MCNC: 84 MG/DL (ref 65–99)
GLUCOSE SERPL-MCNC: 121 MG/DL (ref 65–99)
GLUCOSE SERPL-MCNC: 123 MG/DL (ref 65–99)
GLUCOSE SERPL-MCNC: 94 MG/DL (ref 65–99)
GRAM STN SPEC: NORMAL
HBA1C MFR BLD: 7.8 % (ref 0–5.6)
HCT VFR BLD AUTO: 42.5 % (ref 42–52)
HCT VFR BLD AUTO: 43.1 % (ref 42–52)
HGB BLD-MCNC: 12.4 G/DL (ref 14–18)
HGB BLD-MCNC: 12.7 G/DL (ref 14–18)
IMM GRANULOCYTES # BLD AUTO: 0.08 K/UL (ref 0–0.11)
IMM GRANULOCYTES # BLD AUTO: 0.21 K/UL (ref 0–0.11)
IMM GRANULOCYTES NFR BLD AUTO: 1.3 % (ref 0–0.9)
IMM GRANULOCYTES NFR BLD AUTO: 1.4 % (ref 0–0.9)
LACTATE BLD-SCNC: 2.2 MMOL/L (ref 0.5–2)
LYMPHOCYTES # BLD AUTO: 1.47 K/UL (ref 1–4.8)
LYMPHOCYTES # BLD AUTO: 1.77 K/UL (ref 1–4.8)
LYMPHOCYTES NFR BLD: 10.1 % (ref 22–41)
LYMPHOCYTES NFR BLD: 29.4 % (ref 22–41)
MAGNESIUM SERPL-MCNC: 1.6 MG/DL (ref 1.5–2.5)
MCH RBC QN AUTO: 24.8 PG (ref 27–33)
MCH RBC QN AUTO: 25.4 PG (ref 27–33)
MCHC RBC AUTO-ENTMCNC: 29.2 G/DL (ref 33.7–35.3)
MCHC RBC AUTO-ENTMCNC: 29.5 G/DL (ref 33.7–35.3)
MCV RBC AUTO: 84.8 FL (ref 81.4–97.8)
MCV RBC AUTO: 86.2 FL (ref 81.4–97.8)
MONOCYTES # BLD AUTO: 0.54 K/UL (ref 0–0.85)
MONOCYTES # BLD AUTO: 1.44 K/UL (ref 0–0.85)
MONOCYTES NFR BLD AUTO: 9 % (ref 0–13.4)
MONOCYTES NFR BLD AUTO: 9.9 % (ref 0–13.4)
NEUTROPHILS # BLD AUTO: 11.19 K/UL (ref 1.82–7.42)
NEUTROPHILS # BLD AUTO: 3.37 K/UL (ref 1.82–7.42)
NEUTROPHILS NFR BLD: 55.8 % (ref 44–72)
NEUTROPHILS NFR BLD: 77.1 % (ref 44–72)
NRBC # BLD AUTO: 0 K/UL
NRBC # BLD AUTO: 0 K/UL
NRBC BLD-RTO: 0 /100 WBC
NRBC BLD-RTO: 0 /100 WBC
PATHOLOGY CONSULT NOTE: NORMAL
PHOSPHATE SERPL-MCNC: 3.5 MG/DL (ref 2.5–4.5)
PLATELET # BLD AUTO: 472 K/UL (ref 164–446)
PLATELET # BLD AUTO: 494 K/UL (ref 164–446)
PMV BLD AUTO: 10.3 FL (ref 9–12.9)
PMV BLD AUTO: 10.9 FL (ref 9–12.9)
POTASSIUM SERPL-SCNC: 5.2 MMOL/L (ref 3.6–5.5)
POTASSIUM SERPL-SCNC: 5.2 MMOL/L (ref 3.6–5.5)
POTASSIUM SERPL-SCNC: 5.8 MMOL/L (ref 3.6–5.5)
PROT SERPL-MCNC: 6.1 G/DL (ref 6–8.2)
RBC # BLD AUTO: 5 M/UL (ref 4.7–6.1)
RBC # BLD AUTO: 5.01 M/UL (ref 4.7–6.1)
RH BLD: NORMAL
SIGNIFICANT IND 70042: NORMAL
SITE SITE: NORMAL
SODIUM SERPL-SCNC: 127 MMOL/L (ref 135–145)
SODIUM SERPL-SCNC: 131 MMOL/L (ref 135–145)
SODIUM SERPL-SCNC: 133 MMOL/L (ref 135–145)
SOURCE SOURCE: NORMAL
WBC # BLD AUTO: 14.5 K/UL (ref 4.8–10.8)
WBC # BLD AUTO: 6 K/UL (ref 4.8–10.8)

## 2020-11-10 PROCEDURE — 36556 INSERT NON-TUNNEL CV CATH: CPT

## 2020-11-10 PROCEDURE — 160029 HCHG SURGERY MINUTES - 1ST 30 MINS LEVEL 4: Performed by: SURGERY

## 2020-11-10 PROCEDURE — 86850 RBC ANTIBODY SCREEN: CPT

## 2020-11-10 PROCEDURE — 700111 HCHG RX REV CODE 636 W/ 250 OVERRIDE (IP): Performed by: SURGERY

## 2020-11-10 PROCEDURE — 700102 HCHG RX REV CODE 250 W/ 637 OVERRIDE(OP): Performed by: STUDENT IN AN ORGANIZED HEALTH CARE EDUCATION/TRAINING PROGRAM

## 2020-11-10 PROCEDURE — 700102 HCHG RX REV CODE 250 W/ 637 OVERRIDE(OP): Performed by: INTERNAL MEDICINE

## 2020-11-10 PROCEDURE — 88309 TISSUE EXAM BY PATHOLOGIST: CPT

## 2020-11-10 PROCEDURE — 83735 ASSAY OF MAGNESIUM: CPT

## 2020-11-10 PROCEDURE — 0FBG0ZX EXCISION OF PANCREAS, OPEN APPROACH, DIAGNOSTIC: ICD-10-PCS | Performed by: SURGERY

## 2020-11-10 PROCEDURE — C1725 CATH, TRANSLUMIN NON-LASER: HCPCS | Performed by: SURGERY

## 2020-11-10 PROCEDURE — 86901 BLOOD TYPING SEROLOGIC RH(D): CPT

## 2020-11-10 PROCEDURE — 87205 SMEAR GRAM STAIN: CPT

## 2020-11-10 PROCEDURE — 82962 GLUCOSE BLOOD TEST: CPT

## 2020-11-10 PROCEDURE — 99291 CRITICAL CARE FIRST HOUR: CPT | Performed by: SURGERY

## 2020-11-10 PROCEDURE — 501838 HCHG SUTURE GENERAL: Performed by: SURGERY

## 2020-11-10 PROCEDURE — 700105 HCHG RX REV CODE 258: Performed by: ANESTHESIOLOGY

## 2020-11-10 PROCEDURE — 160002 HCHG RECOVERY MINUTES (STAT): Performed by: SURGERY

## 2020-11-10 PROCEDURE — A9270 NON-COVERED ITEM OR SERVICE: HCPCS | Performed by: STUDENT IN AN ORGANIZED HEALTH CARE EDUCATION/TRAINING PROGRAM

## 2020-11-10 PROCEDURE — 82533 TOTAL CORTISOL: CPT

## 2020-11-10 PROCEDURE — 0D1L0Z4 BYPASS TRANSVERSE COLON TO CUTANEOUS, OPEN APPROACH: ICD-10-PCS | Performed by: SURGERY

## 2020-11-10 PROCEDURE — 700111 HCHG RX REV CODE 636 W/ 250 OVERRIDE (IP): Performed by: ANESTHESIOLOGY

## 2020-11-10 PROCEDURE — 700105 HCHG RX REV CODE 258: Performed by: SURGERY

## 2020-11-10 PROCEDURE — 94669 MECHANICAL CHEST WALL OSCILL: CPT

## 2020-11-10 PROCEDURE — 86900 BLOOD TYPING SEROLOGIC ABO: CPT

## 2020-11-10 PROCEDURE — 02HV33Z INSERTION OF INFUSION DEVICE INTO SUPERIOR VENA CAVA, PERCUTANEOUS APPROACH: ICD-10-PCS | Performed by: SURGERY

## 2020-11-10 PROCEDURE — 0DBL0ZZ EXCISION OF TRANSVERSE COLON, OPEN APPROACH: ICD-10-PCS | Performed by: SURGERY

## 2020-11-10 PROCEDURE — 71045 X-RAY EXAM CHEST 1 VIEW: CPT

## 2020-11-10 PROCEDURE — 88307 TISSUE EXAM BY PATHOLOGIST: CPT

## 2020-11-10 PROCEDURE — 700111 HCHG RX REV CODE 636 W/ 250 OVERRIDE (IP): Performed by: STUDENT IN AN ORGANIZED HEALTH CARE EDUCATION/TRAINING PROGRAM

## 2020-11-10 PROCEDURE — 83036 HEMOGLOBIN GLYCOSYLATED A1C: CPT

## 2020-11-10 PROCEDURE — 700101 HCHG RX REV CODE 250: Performed by: ANESTHESIOLOGY

## 2020-11-10 PROCEDURE — 700101 HCHG RX REV CODE 250: Performed by: SURGERY

## 2020-11-10 PROCEDURE — 160009 HCHG ANES TIME/MIN: Performed by: SURGERY

## 2020-11-10 PROCEDURE — 160035 HCHG PACU - 1ST 60 MINS PHASE I: Performed by: SURGERY

## 2020-11-10 PROCEDURE — A9270 NON-COVERED ITEM OR SERVICE: HCPCS | Performed by: INTERNAL MEDICINE

## 2020-11-10 PROCEDURE — 87186 SC STD MICRODIL/AGAR DIL: CPT

## 2020-11-10 PROCEDURE — 160048 HCHG OR STATISTICAL LEVEL 1-5: Performed by: SURGERY

## 2020-11-10 PROCEDURE — 700105 HCHG RX REV CODE 258: Performed by: STUDENT IN AN ORGANIZED HEALTH CARE EDUCATION/TRAINING PROGRAM

## 2020-11-10 PROCEDURE — 770022 HCHG ROOM/CARE - ICU (200)

## 2020-11-10 PROCEDURE — 160036 HCHG PACU - EA ADDL 30 MINS PHASE I: Performed by: SURGERY

## 2020-11-10 PROCEDURE — 80053 COMPREHEN METABOLIC PANEL: CPT

## 2020-11-10 PROCEDURE — 700111 HCHG RX REV CODE 636 W/ 250 OVERRIDE (IP)

## 2020-11-10 PROCEDURE — 87075 CULTR BACTERIA EXCEPT BLOOD: CPT

## 2020-11-10 PROCEDURE — 80048 BASIC METABOLIC PNL TOTAL CA: CPT

## 2020-11-10 PROCEDURE — 85025 COMPLETE CBC W/AUTO DIFF WBC: CPT

## 2020-11-10 PROCEDURE — 83605 ASSAY OF LACTIC ACID: CPT

## 2020-11-10 PROCEDURE — 84100 ASSAY OF PHOSPHORUS: CPT

## 2020-11-10 PROCEDURE — 87070 CULTURE OTHR SPECIMN AEROBIC: CPT

## 2020-11-10 PROCEDURE — 87077 CULTURE AEROBIC IDENTIFY: CPT

## 2020-11-10 PROCEDURE — 500378 HCHG DRAIN, J-VAC ROUND 19FR: Performed by: SURGERY

## 2020-11-10 PROCEDURE — 160041 HCHG SURGERY MINUTES - EA ADDL 1 MIN LEVEL 4: Performed by: SURGERY

## 2020-11-10 PROCEDURE — C1751 CATH, INF, PER/CENT/MIDLINE: HCPCS

## 2020-11-10 RX ORDER — HYDROMORPHONE HYDROCHLORIDE 1 MG/ML
0.1 INJECTION, SOLUTION INTRAMUSCULAR; INTRAVENOUS; SUBCUTANEOUS
Status: DISCONTINUED | OUTPATIENT
Start: 2020-11-10 | End: 2020-11-10 | Stop reason: HOSPADM

## 2020-11-10 RX ORDER — KETOROLAC TROMETHAMINE 30 MG/ML
15 INJECTION, SOLUTION INTRAMUSCULAR; INTRAVENOUS ONCE
Status: COMPLETED | OUTPATIENT
Start: 2020-11-10 | End: 2020-11-10

## 2020-11-10 RX ORDER — LIDOCAINE HYDROCHLORIDE 20 MG/ML
INJECTION, SOLUTION EPIDURAL; INFILTRATION; INTRACAUDAL; PERINEURAL PRN
Status: DISCONTINUED | OUTPATIENT
Start: 2020-11-10 | End: 2020-11-10 | Stop reason: SURG

## 2020-11-10 RX ORDER — ROCURONIUM BROMIDE 10 MG/ML
INJECTION, SOLUTION INTRAVENOUS PRN
Status: DISCONTINUED | OUTPATIENT
Start: 2020-11-10 | End: 2020-11-10 | Stop reason: SURG

## 2020-11-10 RX ORDER — MORPHINE SULFATE 4 MG/ML
1-4 INJECTION, SOLUTION INTRAMUSCULAR; INTRAVENOUS
Status: DISCONTINUED | OUTPATIENT
Start: 2020-11-10 | End: 2020-11-10

## 2020-11-10 RX ORDER — NOREPINEPHRINE BITARTRATE 0.03 MG/ML
0-30 INJECTION, SOLUTION INTRAVENOUS CONTINUOUS
Status: DISCONTINUED | OUTPATIENT
Start: 2020-11-10 | End: 2020-11-13

## 2020-11-10 RX ORDER — HYDROMORPHONE HYDROCHLORIDE 1 MG/ML
0.4 INJECTION, SOLUTION INTRAMUSCULAR; INTRAVENOUS; SUBCUTANEOUS
Status: DISCONTINUED | OUTPATIENT
Start: 2020-11-10 | End: 2020-11-10 | Stop reason: HOSPADM

## 2020-11-10 RX ORDER — SODIUM CHLORIDE 9 MG/ML
500 INJECTION, SOLUTION INTRAVENOUS ONCE
Status: COMPLETED | OUTPATIENT
Start: 2020-11-10 | End: 2020-11-10

## 2020-11-10 RX ORDER — SODIUM CHLORIDE, SODIUM LACTATE, POTASSIUM CHLORIDE, CALCIUM CHLORIDE 600; 310; 30; 20 MG/100ML; MG/100ML; MG/100ML; MG/100ML
1000 INJECTION, SOLUTION INTRAVENOUS
Status: DISCONTINUED | OUTPATIENT
Start: 2020-11-10 | End: 2020-11-10

## 2020-11-10 RX ORDER — ONDANSETRON 2 MG/ML
4 INJECTION INTRAMUSCULAR; INTRAVENOUS
Status: DISCONTINUED | OUTPATIENT
Start: 2020-11-10 | End: 2020-11-10 | Stop reason: HOSPADM

## 2020-11-10 RX ORDER — SODIUM CHLORIDE, SODIUM LACTATE, POTASSIUM CHLORIDE, CALCIUM CHLORIDE 600; 310; 30; 20 MG/100ML; MG/100ML; MG/100ML; MG/100ML
500 INJECTION, SOLUTION INTRAVENOUS
Status: DISCONTINUED | OUTPATIENT
Start: 2020-11-10 | End: 2020-11-13

## 2020-11-10 RX ORDER — MEPERIDINE HYDROCHLORIDE 25 MG/ML
6.25 INJECTION INTRAMUSCULAR; INTRAVENOUS; SUBCUTANEOUS
Status: DISCONTINUED | OUTPATIENT
Start: 2020-11-10 | End: 2020-11-10 | Stop reason: HOSPADM

## 2020-11-10 RX ORDER — CEFOTETAN DISODIUM 2 G/20ML
INJECTION, POWDER, FOR SOLUTION INTRAMUSCULAR; INTRAVENOUS PRN
Status: DISCONTINUED | OUTPATIENT
Start: 2020-11-10 | End: 2020-11-10 | Stop reason: SURG

## 2020-11-10 RX ORDER — CALCIUM CHLORIDE 100 MG/ML
1 INJECTION INTRAVENOUS; INTRAVENTRICULAR ONCE
Status: COMPLETED | OUTPATIENT
Start: 2020-11-10 | End: 2020-11-10

## 2020-11-10 RX ORDER — HYDROMORPHONE HYDROCHLORIDE 2 MG/ML
1 INJECTION, SOLUTION INTRAMUSCULAR; INTRAVENOUS; SUBCUTANEOUS
Status: DISCONTINUED | OUTPATIENT
Start: 2020-11-10 | End: 2020-11-10

## 2020-11-10 RX ORDER — ACETAMINOPHEN 500 MG
1000 TABLET ORAL EVERY 4 HOURS PRN
Status: DISCONTINUED | OUTPATIENT
Start: 2020-11-10 | End: 2020-11-10 | Stop reason: HOSPADM

## 2020-11-10 RX ORDER — HYDROMORPHONE HYDROCHLORIDE 2 MG/ML
INJECTION, SOLUTION INTRAMUSCULAR; INTRAVENOUS; SUBCUTANEOUS PRN
Status: DISCONTINUED | OUTPATIENT
Start: 2020-11-10 | End: 2020-11-10 | Stop reason: SURG

## 2020-11-10 RX ORDER — SODIUM CHLORIDE, SODIUM GLUCONATE, SODIUM ACETATE, POTASSIUM CHLORIDE AND MAGNESIUM CHLORIDE 526; 502; 368; 37; 30 MG/100ML; MG/100ML; MG/100ML; MG/100ML; MG/100ML
500 INJECTION, SOLUTION INTRAVENOUS CONTINUOUS
Status: DISCONTINUED | OUTPATIENT
Start: 2020-11-10 | End: 2020-11-10 | Stop reason: HOSPADM

## 2020-11-10 RX ORDER — DIPHENHYDRAMINE HYDROCHLORIDE 50 MG/ML
12.5 INJECTION INTRAMUSCULAR; INTRAVENOUS
Status: DISCONTINUED | OUTPATIENT
Start: 2020-11-10 | End: 2020-11-10 | Stop reason: HOSPADM

## 2020-11-10 RX ORDER — HALOPERIDOL 5 MG/ML
1 INJECTION INTRAMUSCULAR
Status: DISCONTINUED | OUTPATIENT
Start: 2020-11-10 | End: 2020-11-10 | Stop reason: HOSPADM

## 2020-11-10 RX ORDER — HYDROMORPHONE HYDROCHLORIDE 1 MG/ML
0.2 INJECTION, SOLUTION INTRAMUSCULAR; INTRAVENOUS; SUBCUTANEOUS
Status: DISCONTINUED | OUTPATIENT
Start: 2020-11-10 | End: 2020-11-10 | Stop reason: HOSPADM

## 2020-11-10 RX ADMIN — ROCURONIUM BROMIDE 50 MG: 10 INJECTION, SOLUTION INTRAVENOUS at 08:06

## 2020-11-10 RX ADMIN — VASOPRESSIN 0.03 UNITS/MIN: 20 INJECTION INTRAVENOUS at 22:01

## 2020-11-10 RX ADMIN — KETOROLAC TROMETHAMINE 15 MG: 30 INJECTION, SOLUTION INTRAMUSCULAR; INTRAVENOUS at 11:39

## 2020-11-10 RX ADMIN — SODIUM CHLORIDE 500 ML: 9 INJECTION, SOLUTION INTRAVENOUS at 15:40

## 2020-11-10 RX ADMIN — LINEZOLID 600 MG: 600 TABLET, FILM COATED ORAL at 21:40

## 2020-11-10 RX ADMIN — CEFEPIME 1 G: 1 INJECTION, POWDER, FOR SOLUTION INTRAMUSCULAR; INTRAVENOUS at 18:17

## 2020-11-10 RX ADMIN — OXYCODONE HYDROCHLORIDE AND ACETAMINOPHEN 2 TABLET: 5; 325 TABLET ORAL at 13:43

## 2020-11-10 RX ADMIN — HYDROMORPHONE HYDROCHLORIDE 1 MG: 2 INJECTION, SOLUTION INTRAMUSCULAR; INTRAVENOUS; SUBCUTANEOUS at 10:13

## 2020-11-10 RX ADMIN — HYDROCORTISONE SODIUM SUCCINATE 50 MG: 100 INJECTION, POWDER, FOR SOLUTION INTRAMUSCULAR; INTRAVENOUS at 19:31

## 2020-11-10 RX ADMIN — PROPOFOL 80 MG: 10 INJECTION, EMULSION INTRAVENOUS at 08:06

## 2020-11-10 RX ADMIN — FENTANYL CITRATE 50 MCG: 50 INJECTION, SOLUTION INTRAMUSCULAR; INTRAVENOUS at 10:03

## 2020-11-10 RX ADMIN — HYDROMORPHONE HYDROCHLORIDE 0.2 MG: 1 INJECTION, SOLUTION INTRAMUSCULAR; INTRAVENOUS; SUBCUTANEOUS at 12:14

## 2020-11-10 RX ADMIN — PSYLLIUM HUSK 1 PACKET: 3.4 POWDER ORAL at 05:24

## 2020-11-10 RX ADMIN — FENTANYL CITRATE 50 MCG: 50 INJECTION, SOLUTION INTRAMUSCULAR; INTRAVENOUS at 11:35

## 2020-11-10 RX ADMIN — MORPHINE SULFATE 4 MG: 4 INJECTION INTRAVENOUS at 15:46

## 2020-11-10 RX ADMIN — LINEZOLID 600 MG: 600 TABLET, FILM COATED ORAL at 05:23

## 2020-11-10 RX ADMIN — FENTANYL CITRATE 50 MCG: 50 INJECTION, SOLUTION INTRAMUSCULAR; INTRAVENOUS at 11:31

## 2020-11-10 RX ADMIN — FENTANYL CITRATE 25 MCG: 50 INJECTION, SOLUTION INTRAMUSCULAR; INTRAVENOUS at 19:32

## 2020-11-10 RX ADMIN — FENTANYL CITRATE 50 MCG: 50 INJECTION, SOLUTION INTRAMUSCULAR; INTRAVENOUS at 09:41

## 2020-11-10 RX ADMIN — FENTANYL CITRATE 50 MCG: 50 INJECTION, SOLUTION INTRAMUSCULAR; INTRAVENOUS at 08:06

## 2020-11-10 RX ADMIN — SODIUM CHLORIDE: 9 INJECTION, SOLUTION INTRAVENOUS at 13:52

## 2020-11-10 RX ADMIN — NOREPINEPHRINE BITARTRATE 10 MCG/MIN: 1 INJECTION INTRAVENOUS at 16:52

## 2020-11-10 RX ADMIN — SODIUM CHLORIDE, SODIUM GLUCONATE, SODIUM ACETATE, POTASSIUM CHLORIDE AND MAGNESIUM CHLORIDE 500 ML: 526; 502; 368; 37; 30 INJECTION, SOLUTION INTRAVENOUS at 11:45

## 2020-11-10 RX ADMIN — MIDAZOLAM 1 MG: 1 INJECTION INTRAMUSCULAR; INTRAVENOUS at 08:06

## 2020-11-10 RX ADMIN — SODIUM CHLORIDE, POTASSIUM CHLORIDE, SODIUM LACTATE AND CALCIUM CHLORIDE 1000 ML: 600; 310; 30; 20 INJECTION, SOLUTION INTRAVENOUS at 18:32

## 2020-11-10 RX ADMIN — SERTRALINE 125 MG: 100 TABLET, FILM COATED ORAL at 05:23

## 2020-11-10 RX ADMIN — SODIUM CHLORIDE: 9 INJECTION, SOLUTION INTRAVENOUS at 07:47

## 2020-11-10 RX ADMIN — SUGAMMADEX 400 MG: 100 INJECTION, SOLUTION INTRAVENOUS at 10:52

## 2020-11-10 RX ADMIN — METRONIDAZOLE 500 MG: 500 INJECTION, SOLUTION INTRAVENOUS at 21:40

## 2020-11-10 RX ADMIN — POTASSIUM CHLORIDE 40 MEQ: 1500 TABLET, EXTENDED RELEASE ORAL at 05:24

## 2020-11-10 RX ADMIN — SODIUM CHLORIDE: 9 INJECTION, SOLUTION INTRAVENOUS at 03:51

## 2020-11-10 RX ADMIN — PHENYLEPHRINE HYDROCHLORIDE 30 MCG/MIN: 10 INJECTION INTRAVENOUS at 09:27

## 2020-11-10 RX ADMIN — FLUCONAZOLE 400 MG: 200 TABLET ORAL at 05:23

## 2020-11-10 RX ADMIN — SODIUM CHLORIDE: 9 INJECTION, SOLUTION INTRAVENOUS at 17:05

## 2020-11-10 RX ADMIN — ROCURONIUM BROMIDE 50 MG: 10 INJECTION, SOLUTION INTRAVENOUS at 08:27

## 2020-11-10 RX ADMIN — FENTANYL CITRATE 50 MCG: 50 INJECTION, SOLUTION INTRAMUSCULAR; INTRAVENOUS at 11:46

## 2020-11-10 RX ADMIN — AMIODARONE HYDROCHLORIDE 1 MG/MIN: 1.8 INJECTION, SOLUTION INTRAVENOUS at 19:36

## 2020-11-10 RX ADMIN — OLANZAPINE 7.5 MG: 5 TABLET, FILM COATED ORAL at 21:41

## 2020-11-10 RX ADMIN — NOREPINEPHRINE BITARTRATE 30 MCG/MIN: 1 INJECTION INTRAVENOUS at 22:04

## 2020-11-10 RX ADMIN — CEFEPIME 1 G: 1 INJECTION, POWDER, FOR SOLUTION INTRAMUSCULAR; INTRAVENOUS at 21:40

## 2020-11-10 RX ADMIN — SODIUM CHLORIDE, POTASSIUM CHLORIDE, SODIUM LACTATE AND CALCIUM CHLORIDE 500 ML: 600; 310; 30; 20 INJECTION, SOLUTION INTRAVENOUS at 21:50

## 2020-11-10 RX ADMIN — HYDROMORPHONE HYDROCHLORIDE 0.4 MG: 1 INJECTION, SOLUTION INTRAMUSCULAR; INTRAVENOUS; SUBCUTANEOUS at 12:49

## 2020-11-10 RX ADMIN — AMIODARONE HYDROCHLORIDE 150 MG: 1.5 INJECTION, SOLUTION INTRAVENOUS at 19:20

## 2020-11-10 RX ADMIN — PHENYLEPHRINE HYDROCHLORIDE 70 MCG/MIN: 10 INJECTION INTRAVENOUS at 13:58

## 2020-11-10 RX ADMIN — CEFOTETAN DISODIUM 2 G: 2 INJECTION, POWDER, FOR SOLUTION INTRAMUSCULAR; INTRAVENOUS at 08:15

## 2020-11-10 RX ADMIN — SODIUM CHLORIDE 500 ML: 9 INJECTION, SOLUTION INTRAVENOUS at 17:38

## 2020-11-10 RX ADMIN — CALCIUM CHLORIDE 1 G: 100 INJECTION, SOLUTION INTRAVENOUS at 19:30

## 2020-11-10 RX ADMIN — FENTANYL CITRATE 50 MCG: 50 INJECTION, SOLUTION INTRAMUSCULAR; INTRAVENOUS at 07:50

## 2020-11-10 RX ADMIN — FENTANYL CITRATE 50 MCG: 50 INJECTION, SOLUTION INTRAMUSCULAR; INTRAVENOUS at 09:17

## 2020-11-10 RX ADMIN — FENTANYL CITRATE 50 MCG: 50 INJECTION, SOLUTION INTRAMUSCULAR; INTRAVENOUS at 11:55

## 2020-11-10 RX ADMIN — HYDROMORPHONE HYDROCHLORIDE 0.2 MG: 1 INJECTION, SOLUTION INTRAMUSCULAR; INTRAVENOUS; SUBCUTANEOUS at 12:08

## 2020-11-10 RX ADMIN — PHENYLEPHRINE HYDROCHLORIDE 20 MCG/MIN: 10 INJECTION INTRAVENOUS at 08:30

## 2020-11-10 RX ADMIN — LIDOCAINE HYDROCHLORIDE 20 MG: 20 INJECTION, SOLUTION EPIDURAL; INFILTRATION; INTRACAUDAL at 08:06

## 2020-11-10 RX ADMIN — ROCURONIUM BROMIDE 50 MG: 10 INJECTION, SOLUTION INTRAVENOUS at 09:22

## 2020-11-10 RX ADMIN — MIDAZOLAM 1 MG: 1 INJECTION INTRAMUSCULAR; INTRAVENOUS at 07:50

## 2020-11-10 ASSESSMENT — PAIN DESCRIPTION - PAIN TYPE
TYPE: ACUTE PAIN;SURGICAL PAIN
TYPE: SURGICAL PAIN
TYPE: ACUTE PAIN;SURGICAL PAIN

## 2020-11-10 ASSESSMENT — FIBROSIS 4 INDEX: FIB4 SCORE: 0.62

## 2020-11-10 ASSESSMENT — PAIN SCALES - GENERAL: PAIN_LEVEL: 5

## 2020-11-10 NOTE — OR NURSING
Dr Camacho at bedside. Updated on pt condition. afib 140's. No orders for rate control. Pain appears tolerable

## 2020-11-10 NOTE — PROGRESS NOTES
Pt report received from Doris PÉREZ. Pt is A and O x 4 with complaint of left sided abd pain 7/10. Pt medicated. POC addressed and all questions answered. Pt bed is locked and in low position. Call light within reach.

## 2020-11-10 NOTE — ANESTHESIA TIME REPORT
Anesthesia Start and Stop Event Times     Date Time Event    11/10/2020 0734 Ready for Procedure     0747 Anesthesia Start     1113 Anesthesia Stop        Responsible Staff  11/10/20    Name Role Begin End    Lisa Camacho M.D. Anesth 0747 1113        Preop Diagnosis (Free Text):  Pre-op Diagnosis     colon perforation        Preop Diagnosis (Codes):    Post op Diagnosis  Perforated sigmoid colon (HCC)      Premium Reason  Non-Premium    Comments:

## 2020-11-10 NOTE — ANESTHESIA POSTPROCEDURE EVALUATION
Patient: Sebastián Castro    Procedure Summary     Date: 11/10/20 Room / Location: [place] [address] / SURGERY [facility]    Anesthesia Start: 0747 Anesthesia Stop: 1113    Procedure: COLECTOMY-SEGMENTAL, COLOSTOMY, MOBILIZATION OF SPLENIC FLEXURE, WOUND VAC PLACEMENT, IRRIGATION AND DEBRIDMENT FLANK WOUND (N/A Abdomen) Diagnosis: (colon perforation)    Surgeons: Kin Desouza D.O. Responsible Provider: Lisa Camacho M.D.    Anesthesia Type: general ASA Status: 4          Final Anesthesia Type: general  Last vitals  BP   Blood Pressure: 104/75, Arterial BP: (!) 82/45    Temp   36.7 °C (98 °F)    Pulse   Pulse: (!) 140   Resp   18    SpO2   96 %      Anesthesia Post Evaluation    Patient location during evaluation: PACU  Patient participation: complete - patient participated  Level of consciousness: awake  Pain score: 5    Airway patency: patent  Anesthetic complications: no  Cardiovascular status: hemodynamically unstable and tachycardic  Respiratory status: acceptable and face mask  Hydration status: acceptable  Comments: Portable CXR for line placement showed appropriate placement without PTX.    PONV: none           Nurse Pain Score: 7 (NPRS)

## 2020-11-10 NOTE — PROGRESS NOTES
Call placed to [facility] for orders for pain medication and vasopressors. Previous orders were automatically discontinued on transfer.

## 2020-11-10 NOTE — CARE PLAN
Problem: Nutritional:  Goal: Achieve adequate nutritional intake  Description: Advance diet as tolerated past clear liquids. Patient will consume >50% of meals.  Outcome: PROGRESSING AS EXPECTED     Was taking % of most meals prior to surgery

## 2020-11-10 NOTE — ANESTHESIA PROCEDURE NOTES
Arterial Line  Performed by: Lisa Camacho M.D.  Authorized by: Lisa Camacho M.D.     Start Time:  11/10/2020 7:52 AM  End Time:  11/10/2020 8:08 AM  Localization: ultrasound guidance  Image captured, interpreted and electronically stored.  Patient Location:  OR  Indication: continuous blood pressure monitoring and blood sampling needed        Catheter Size:  20 G  Seldinger Technique?: Yes    Laterality:  Left  Site:  Radial artery  Line Secured:  Antimicrobial disc and transparent dressing  Events: patient tolerated procedure well with no complications, greater than 3 attempts and hematoma

## 2020-11-10 NOTE — PROGRESS NOTES
Pt arrived to S108 from PACU.    Temp 97.6f    BP 91/68   93% 5L mask  RR 13  Weight 159kg bed scale    2 RN skin check with Arlin RN:  LUQ ostomy  Midline wound vac  Areas of rash from EKG leads  LLQ BETTE drain  IAD in groin  Redness in pannus - interdry placed  Red rash in armpits  Bruising to bilateral foreams  Redness to heels, blanching - heel mepilex placed  Bilateral forearm bruising  R 4th toe discoloration  Missing R great toe  Elbows red and blanching   Posterior trunk red rash  Sacrum red, moist, open sores - barrier paste ad mepilex applied, photo taken and uploaded.

## 2020-11-10 NOTE — OP REPORT
Operative report    PreOp Diagnosis: Large bowel perforation at the splenic flexure, abdominal wall abscess    PostOp Diagnosis: Same    Procedure(s):  COLECTOMY-SEGMENTAL, COLOSTOMY, MOBILIZATION OF SPLENIC FLEXURE, WOUND VAC PLACEMENT, IRRIGATION AND DEBRIDMENT FLANK WOUND - Wound Class: Dirty or Infected    Surgeon(s):  PILLO Gross M.D.    Anesthesiologist/Type of Anesthesia:  Anesthesiologist: Lisa Camacho M.D./General    Surgical Staff:  Circulator: Mario Ritter R.N.  Relief Circulator: Lisa Zamorano R.N.  Scrub Person: Josselin Sandra    Specimens removed if any:  ID Type Source Tests Collected by Time Destination   1 : Left upper quadrant abscess for culture Body Fluid Abscess AEROBIC/ANAEROBIC CULTURE (SURGERY) Kin Desouza D.O. 11/10/2020 10:39 AM    A : splenic flexure Tissue Colon PATHOLOGY SPECIMEN Kin Desouza D.O. 11/10/2020 10:40 AM    B : Pancrease Tissue Pancreas PATHOLOGY SPECIMEN Kin Desouza D.O. 11/10/2020 10:41 AM        Estimated Blood Loss: 500 cc    Findings: Left upper quadrant abscess inferior to spleen with large bowel wall defect just proximal to the splenic flexure.  Reflux of bowel contents and gas from the bowel wall defect.  Abdominal wall abscess along the pre-existing drain tract.    Complications: None noted    Indication: 55-year-old male who had an interventional radiology place a drain in the left upper quadrant abscess about a week ago.  Patient had CT scan consistent with a large wall defect in the bowel near where the drain was resting.  Drain output was stool and the gas escaping from the large bowel prevented the drain bulb from remaining on suction.  Decision was made to take see patient to the operating room for colectomy and likely colostomy    Operative report: Patient was taken to the operating room placed in the supine position on the operating table after adequate general anesthesia was achieved an arterial line was  placed central line was placed and a Zabala catheter was placed.  We also placed an orogastric tube.  The abdomen was then prepped and draped in standard fashion.  Generous midline incision was made from just below the xiphoid to just below the umbilicus through the skin and subcutaneous tissues.  We dissected to the fascia using the Bovie cautery and opened along the length of the skin wound.  The Omni retractor was then docked and we provided a generous exposure of the left side of the abdomen.  We then started at the sigmoid colon and the peritoneal reflection was taken down using combination of the Bovie cautery as well as the LigaSure device and blunt dissection.  We followed this up to the left upper quadrant to the end of the white line of Toldt.  At this point we encountered we thought was the abscess cavity was firm mass adherent to the splenic flexure of the colon.  We then started our dissection down the transverse colon starting about 20 cm proximal to the splenic flexure.  The omentum was dissected off of the bowel wall using the Bovie cautery as well as a LigaSure device.  Once the omentum was mobilized we then carefully divided the splenocolic ligaments using the LigaSure device.  Once this was completed we reached the inflammatory mass in the left upper quadrant.  We bluntly dissected through this to mobilize it from the lateral wall.  A large amount of stool consistent with localized feculent peritonitis was evacuated.  Cultures were sent.  We are then able to mobilize the splenic flexure medially.  Once this was completed we then made windows in the mesentery of the proximal descending colon and the distal transverse colon.  The PACO stapler was used to divide the bowel.  We then divided the mesentery of the splenic flexure using the LigaSure device.  Once this was completed it was passed off the table.  Examination revealed perforation just proximal to the splenic flexure in the antimesenteric  sidewall.  Examination of the wound bed after irrigation revealed a lot of slough over what appeared to be the tail of the pancreas.  Necrotic tissue was debrided using a ring forcep and what ever we could remove with a light stripping was debrided.  We were concerned that this might be pancreas so this was sent for specimen as well.  We then brought a 19 Burmese Rene drain through a stab wound in the lateral abdominal wall.  The drain was placed in the area of the abscess and and tracked up around behind the spleen.  The existing pigtail drain was cut and the pigtail was passed off the table.  We then irrigated the area.  Hemostasis was achieved using 3-0 Vicryl sutures over a small amount of venous bleeding.  We then placed Sita powder.  Once this was completed we made a defect in the abdominal wall above and lateral to the umbilicus overlying the lateral portion of the rectus sheath.  Defect was about 2 cm in diameter.  A cruciate incision was made in the anterior sheath and the rectus muscles were split along their fibers.  We open the posterior sheath and delivered a Florence clamp into the abdomen which was used to pull the distal transverse colon up through the abdominal wall.  Once this was completed we once again irrigated the abdomen.  We made sure all the laparotomy pads were removed.  The retractor was undocked.  We then closed the fascia of the abdominal wall using a running #1 PDS stitch.  The abdominal wall skin was loosely approximated using interrupted staples and then the wound dressing in the form of a vacuum-assisted closure device was placed.  The device was placed on a 25 mmHg suction.  We then cleaned and dried the abdominal wall and the ostomy was matured using 3-0 Vicryl sutures.  Ostomy appliance was then applied.  The drain was secured using 2-0 nylon and then placed to bulb suction.  We removed the IR drain from the left lateral abdominal wall and irrigated the wound ensuring that there  was no sign of existing abscess.  Packing strip was placed within this wound.  Dressings were placed over the lateral wound as well as the drain site.  Patient was then awakened from anesthesia and taken the postanesthesia care unit in stable condition.  The sponge, needle and instrument counts were correct in the end the case.        11/10/2020 11:04 AM Kin Desouza D.O.

## 2020-11-10 NOTE — NON-PROVIDER
[facility] FAMILY MEDICINE PROGRESS NOTE     Attending:   Dr. Kin Desouza    Resident:   Jason Reyes M.D.    PATIENT:   Sebastián Castro; [id number]; 1965    ID:   55 y.o. male with PmHx of morbid obesity, CHF, and DM recently discharged 10/31 from [facility] for splenic hematoma admitted for perisplenic abscess, bacteremia, and a-fib with RVR.     SUBJECTIVE:   No acute events overnight, but HR ranges from 90s-120s. Patient was taken to surgery this morning for a colectomy.     OBJECTIVE:  Vitals:    11/09/20 1933 11/10/20 0000 11/10/20 0355 11/10/20 0649   BP: (!) 93/58 (!) 92/62 (!) 96/58 115/67   Pulse: (!) 110 98 (!) 111 (!) 126   Resp: 18 17 17 18   Temp: 36.1 °C (96.9 °F) 36.2 °C (97.2 °F) 36.2 °C (97.2 °F) 36.7 °C (98 °F)   TempSrc: Temporal Temporal Temporal Temporal   SpO2: 93% 92% 90% 95%   Weight: (!) 155.7 kg (343 lb 4.1 oz)      Height:           Intake/Output Summary (Last 24 hours) at 11/7/2020 0850  Last data filed at 11/6/2020 1758  Gross per 24 hour   Intake 1920 ml   Output 30 ml   Net 1890 ml       PHYSICAL EXAM:  General: No acute distress, afebrile, resting comfortably  Cardiovascular: +Irregularly irregular rhythm, tachycardic, normal S1/S2 no murmurs, rubs, or gallops.  Respiratory: CTAB, no tachypnea or retractions  Abdomen: soft,obese,+tender to mild palpation, normoactive BS.       LABS:  Recent Labs     11/09/20  1337 11/10/20  1129   WBC 8.3 6.0   RBC 4.53* 5.01   HEMOGLOBIN 11.6* 12.4*   HEMATOCRIT 38.8* 42.5   MCV 85.7 84.8   MCH 25.6* 24.8*   RDW 55.2* 55.8*   PLATELETCT 353 494*   MPV 10.8 10.3   NEUTSPOLYS 49.60 55.80   LYMPHOCYTES 25.70 29.40   MONOCYTES 18.80* 9.00   EOSINOPHILS 4.30 3.50   BASOPHILS 1.00 1.00     Recent Labs     11/09/20  1337 11/10/20  0331 11/10/20  1129   SODIUM 130* 131* 127*   POTASSIUM 5.6* 5.2 5.2   CHLORIDE 99 101 100   CO2 22 19* 19*   BUN 31* 31* 29*   CREATININE 2.15* 1.95* 1.84*   CALCIUM 8.8 8.8 8.4*   MAGNESIUM  --   --  1.6   PHOSPHORUS  --   --  3.5    ALBUMIN  --   --  2.2*     Estimated GFR/CRCL = Estimated Creatinine Clearance: 64.3 mL/min (A) (by C-G formula based on SCr of 1.95 mg/dL (H)).  Recent Labs     11/09/20  1337 11/09/20  1640 11/10/20  0012 11/10/20  0331 11/10/20  0522 11/10/20  1129   GLUCOSE 135*  --   --  94  --  123*   POCGLUCOSE  --  126* 103*  --  84  --                  No results for input(s): INR, APTT, FIBRINOGEN in the last 72 hours.    Invalid input(s): DIMER      IMAGING:  CT-ABDOMEN-PELVIS WITH  Addendum: Addendum:   A prior noncontrast CT of the abdomen and pelvis from [facility] dated 10/19/2020 was made available for comparison. On the prior study a perisplenic/subcapsular fluid collection was seen however  did not contain air at that time and there was no discrete evidence of communication with the transverse colon.  Communication with splenic flexure of the colon and air within the collection are new from the prior study.  Narrative: 11/3/2020 4:02 PM    Impression:   1.  Peripherally enhancing perisplenic fluid collection which communicates with the splenic flexure of the colon and which contains foci of air is most consistent with an abscess/infected fluid collection. There is nonenhancement of the anterior aspect of the spleen suggesting infection/infarction. Surgical consultation is recommended.  2.  Hepatomegaly and hepatic steatosis.  3.  Cholelithiasis.  4.  Right renal cortical scarring. No hydronephrosis.  5.  Small left pleural effusion. Mild bibasilar atelectasis.  6.  Subacute to chronic appearing T11 and T12 superior endplate compression fractures.      MEDS:  Current Facility-Administered Medications   Medication Last Admin   • [MAR Hold] furosemide (LASIX) tablet 20 mg Stopped at 11/10/20 0600   • NS infusion New Bag at 11/10/20 0351   • [MAR Hold] ondansetron (ZOFRAN) syringe/vial injection 4 mg 4 mg at 11/08/20 1240   • [MAR Hold] influenza vaccine quad injection 0.5 mL     • [MAR Hold]  metoprolol SR (TOPROL XL) tablet 50 mg Stopped at 11/09/20 0600   • [MAR Hold] psyllium (METAMUCIL/KONSYL) 1 Packet 1 Packet at 11/10/20 0524   • [MAR Hold] linezolid (ZYVOX) tablet 600 mg 600 mg at 11/10/20 0523   • [MAR Hold] ondansetron (ZOFRAN ODT) dispertab 4 mg 4 mg at 11/08/20 0515   • [MAR Hold] fluconazole (DIFLUCAN) tablet 400 mg 400 mg at 11/10/20 0523   • [MAR Hold] oxyCODONE-acetaminophen (PERCOCET) 5-325 MG per tablet 1-2 Tab 2 Tab at 11/09/20 2120   • [Held by provider] lisinopril (PRINIVIL) tablet 10 mg Stopped at 11/09/20 0600   • [MAR Hold] OLANZapine (ZYPREXA) tablet 7.5 mg 7.5 mg at 11/09/20 2026   • [MAR Hold] sertraline (Zoloft) tablet 125 mg 125 mg at 11/10/20 0523   • [Held by provider] spironolactone (ALDACTONE) tablet 25 mg Stopped at 11/09/20 0600   • [MAR Hold] insulin regular (HumuLIN R,NovoLIN R) injection Stopped at 11/06/20 1800    And   • [MAR Hold] glucose 4 g chewable tablet 16 g      And   • [MAR Hold] dextrose 50% (D50W) injection 50 mL     • [MAR Hold] labetalol (NORMODYNE/TRANDATE) injection 10 mg         ASSESSMENT/PLAN:  55 y.o. male admitted with perisplenic abscess with fistula formation and gram positive bacteremia.     #Perisplenic abscess/LUQ pain/N/V/Subcapsular splenic hematoma  #Gram positive bacteremia   - 11/4 CT-guided BETTE drain placed, draining brown fluid.   - General surgery following; in surgery 11/10 AM for colectomy   - Metronidazole, linezolid, and fluconazole per ID; C3 DC-ed      #A-fib w/ RVR  #hypotension  --129 overnight, BP 90s-110s. Continue metoprolol 50mg with hold for sys bp below 110  -Pt denies ever taking anticoagulation. Cardiology deemed him not a good candidate for anticoagulation due to falls.   - Cardio consult recommends continuing with current treatment. Afib and hypotension will improve with treatment of his infection.  -Lasix dose decreased and possible D/C of spironolactone or lisinopril due to symptomatic  hypotension      #Hyponatremia, resolved   #Hypokalemia, resolved    #+Troponin, elevated BNP, CHF  -Troponins elevated but at patient's baseline upon admission.  Echo earlier this year shows EF 30%. BNP elevated, continue  Furosemide, lisinopril, and spironolactone.  Strict I/Os, low Na diet.  -Troponins trending down without EKG changes or chest pain reported.    #DM  -Controlled on inpatient sliding scale. Resume home metformin once no further contrast imaging desire.    #Diarrhea  - reducing in frequency. C-diff stool toxin negative, + recent antibiotic use inpatient.       Dispo: Once cleared by general surgery, discharge back to home with PO abx and cardiology followup.

## 2020-11-10 NOTE — PROGRESS NOTES
Prior to Pt transferring to surgery, with removal of electrodes, skin redness noted under electrodes. Sites rotated.

## 2020-11-10 NOTE — ANESTHESIA PREPROCEDURE EVALUATION
Relevant Problems   CARDIAC   (+) Chronic atrial fibrillation   (+) Essential hypertension   (+) Longstanding persistent atrial fibrillation (HCC)   (+) Paroxysmal A-fib with RVR (HCC)      GI   (+) GERD (gastroesophageal reflux disease)      ENDO   (+) Type 2 diabetes mellitus, without long-term current use of insulin, with peripheral neuropathy (HCC)       Physical Exam    Airway   Mallampati: III  TM distance: >3 FB       Cardiovascular   Rhythm: irregular  Rate: abnormal     Dental - normal exam      Very poor dentition   Pulmonary   (+) decreased breath sounds     Abdominal   (+) obese     Neurological              Anesthesia Plan    ASA 4   ASA physical status 4 criteria: sepsis and severe reduction of ejection fractions    Plan - general       Airway plan will be ETT        Induction: intravenous    Postoperative Plan: Postoperative administration of opioids is intended.    Pertinent diagnostic labs and testing reviewed    Informed Consent:    Anesthetic plan and risks discussed with patient.    Use of blood products discussed with: whom consented to blood products.

## 2020-11-10 NOTE — PROGRESS NOTES
Updated Dr. Collins on low UOP, 15 mL since arrival postop.   Reviewed current VS, IVF, and brandon rate.  MD to enter orders as needed.

## 2020-11-10 NOTE — OR NURSING
Report to Neena DYER. AXOX4. AFib 130-140s. BP 's . neosynephrine 70 mcg/min.    Ex lap/ colectomy/ colostomy/ mid abdominal wound vac/ I and D left flank drain. BETTE drain.60 cc.   Zabala 950 cc. initially sheryl. Now yellow.  2500 cc total IVF.    Skin issues due to body habitus/ liquid stool. Redness/ yeast.

## 2020-11-10 NOTE — ANESTHESIA PROCEDURE NOTES
Airway    Date/Time: 11/10/2020 8:11 AM  Performed by: Lisa Camacho M.D.  Authorized by: Lisa Camacho M.D.     Location:  OR  Urgency:  Elective  Difficult Airway: No    Indications for Airway Management:  Anesthesia      Spontaneous Ventilation: absent    Sedation Level:  Deep  Preoxygenated: Yes    Patient Position:  Sniffing  MILS Maintained Throughout: No    Mask Difficulty Assessment:  1 - vent by mask  Final Airway Type:  Endotracheal airway  Final Endotracheal Airway:  ETT  Cuffed: Yes    Technique Used for Successful ETT Placement:  Direct laryngoscopy    Insertion Site:  Oral  Blade Type:  M[place]ado  Laryngoscope Blade/Videolaryngoscope Blade Size:  2  ETT Size (mm):  7.5  Placement Verified by: capnometry    Cormack-Lehane Classification:  Grade I - full view of glottis  Number of Attempts at Approach:  1

## 2020-11-10 NOTE — PROCEDURES
"Central line Op Note    Date of operation: 11/10/2020    Preoperative diagnosis: Cardiovascular shock    Postoperative diagnosis: Same    Operation performed: Insertion of right subclavian triple lumen catheter placement.    Surgeon: Dr. Desouza    Anesthesia: General endotracheal    Indications: The patient is a 55 y.o. male. Placement of a central line was performed in the SICU    Procedure: Following informed consent, the patient was properly identified and optimally positioned in bed. Maximal barrier precautions were employed. A\" time out\" was initiated. The correct patient, procedure, and operative site was verified. The patient's allergy status was assessed. Procedural modifications were made as required.    The right chest wall was prepped with chlorhexidine and draped into a sterile field  The patient was placed in a shallow Trendelenburg position. The subclavian vein was accessed percutaneously and a wire advanced without resistance. A previously flushed triple lumen catheter was passed using sterile Selldinger technique and secured to the skin with silk sutures. All of the ports flushed and aspirated freely. The site was cleaned. An antibacterial disk was placed about the catheter exit site and dressed with a transparent sterile occlusive dressing.    The patient tolerated the procedure well. There were no apparent immediate complications. A stat portable chest radiograph was ordered.    "

## 2020-11-10 NOTE — PROGRESS NOTES
[facility] [place]     Attending:   Sara Duran MD    Resident:   Vilma Horvath MD    PATIENT:   eSbastián Castro; [id number]; 1965    ID:   55 y.o. male recently discharged 10/31 from [facility] ([place]) for splenic hematoma, admitted to [facility] for perisplenic abscess, a.fib with RVR.    SUBJECTIVE:   Interval events: no significant events overnight, BP continues to be low, decreased stool output.    OBJECTIVE:  Vitals:    11/09/20 1637 11/09/20 1933 11/10/20 0000 11/10/20 0355   BP: (!) 90/57 (!) 93/58 (!) 92/62 (!) 96/58   Pulse: 100 (!) 110 98 (!) 111   Resp: 18 18 17 17   Temp: 36.6 °C (97.8 °F) 36.1 °C (96.9 °F) 36.2 °C (97.2 °F) 36.2 °C (97.2 °F)   TempSrc: Temporal Temporal Temporal Temporal   SpO2: 95% 93% 92% 90%   Weight:  (!) 155.7 kg (343 lb 4.1 oz)     Height:         No intake or output data in the 24 hours ending 11/08/20 1513    PHYSICAL EXAM:  General: No acute distress, afebrile, resting comfortably  HEENT: NC/AT. EOMI. MMM, neck supple without adenopathy  Cardiovascular: +Irregularly irregular rhythm, tachycardic, normal S1/S2 no murmurs rubs, or gallops, cap refill brisk.  Respiratory: CTAB, no tachypnea or retractions  Abdomen: soft,obese,+tender to mild palpation, normoactive BS. BETTE drain with cloudy brown output on left side. No guarding/rebound.  EXT:  +bruising on upper extremities. 1+ LE edema. No rashes or skin changes noted. Pulses 2+ DP and radial bilaterally  Neuro: Non-focal      LABS:  Recent Labs     11/09/20  1337   WBC 8.3   RBC 4.53*   HEMOGLOBIN 11.6*   HEMATOCRIT 38.8*   MCV 85.7   MCH 25.6*   RDW 55.2*   PLATELETCT 353   MPV 10.8   NEUTSPOLYS 49.60   LYMPHOCYTES 25.70   MONOCYTES 18.80*   EOSINOPHILS 4.30   BASOPHILS 1.00     Recent Labs     11/09/20  1337 11/10/20  0331   SODIUM 130* 131*   POTASSIUM 5.6* 5.2   CHLORIDE 99 101   CO2 22 19*   BUN 31* 31*   CREATININE 2.15* 1.95*   CALCIUM 8.8 8.8     Estimated GFR/CRCL = Estimated Creatinine Clearance: 64.3 mL/min (A)  (by C-G formula based on SCr of 1.95 mg/dL (H)).  Recent Labs     11/09/20  1143 11/09/20  1337 11/09/20  1640 11/10/20  0012 11/10/20  0331   GLUCOSE  --  135*  --   --  94   POCGLUCOSE 123*  --  126* 103*  --        MICROBIOLOGY:   Blood Culture   Date Value Ref Range Status   03/15/2018 No growth after 5 days of incubation.  Final        IMAGING:   CT-ABDOMEN-PELVIS WITH   Final Result      Interval decrease in size of perisplenic abscess. Pigtail catheter now in place      Abscess has likely fistulous communication with the abutting splenic flexure of the colon. Colon is otherwise normal in appearance      Mild hepatosplenomegaly      Decreasing small left pleural effusion      Subacute anterior wedge compression fractures in the caudal thoracic spine      CT-DRAIN-PERITONEAL   Final Result      1.  CT guided perisplenic abscess catheter drainage.   2.  The current plan is to obtain a follow-up CT in 5-7 days..      OUTSIDE IMAGES-CT CHEST   Final Result      OUTSIDE IMAGES-CT ABDOMEN /PELVIS   Final Result      CT-ABDOMEN-PELVIS WITH   Final Result   Addendum 1 of 1   Addendum:   A prior noncontrast CT of the abdomen and pelvis from [facility] dated 10/19/2020 was made available for comparison. On the    prior study a perisplenic/subcapsular fluid collection was seen however    did not contain air at that time and    there was no discrete evidence of communication with the transverse colon.    Communication with splenic flexure of the colon and air within the    collection are new from the prior study.      Final      DX-CHEST-PORTABLE (1 VIEW)   Final Result      Slight interval interval improvement in congestive heart failure.          CULTURES:   Results     Procedure Component Value Units Date/Time    SARS-CoV-2, PCR (In-House) [[id number]] Collected: 11/09/20 1855    Order Status: Completed Updated: 11/09/20 1298     SARS-CoV-2 Source NP Swab     SARS-CoV-2 by PCR NotDetected      Comment: [facility]: PLEASE REFER TO DE-ESCALATION AND RETESTING PROTOCOL  on [email or web address]  **The [facility] COVID-19 SARS-CoV-2 test has been made available for use under  the Emergency Use Authorization (EUA) only.         Narrative:      Collected By:[id number] ZULY MOURA  Is patient being admitted?->Yes  Does this patient meet criteria for Rush/Cepheid per [facility]  Inpatient Workflow? (See workflow link below)->Yes  Expected turn around time?->Rush (Cepheid 2-4 hours)  Is this the patients First SARS CoV-2 test?->No  Is this patient employed in healthcare?->No  Is the patient symptomatic as defined by the CDC?->No  Is the patient hospitalized?->Yes  Is the patient in the ICU?->No  Is the patient a resident in a congregate care setting?->No  Is the patient pregnant?->No    COVID/SARS CoV-2 PCR [[id number]] Collected: 11/09/20 1855    Order Status: Completed Specimen: Respirate from Nasopharyngeal Updated: 11/09/20 1907     COVID Order Status Received     Comment: The order for SARS CoV-2 testing has been received by the  Laboratory. This result is neither positive nor negative.  Final results of testing will report in 24-48 hours, separately.         Narrative:      Collected By:[id number] ZULY MOURA  Is patient being admitted?->Yes  Does this patient meet criteria for Rush/Cepheid per [facility]  Inpatient Workflow? (See workflow link below)->Yes  Expected turn around time?->Rush (Cepheid 2-4 hours)  Is this the patients First SARS CoV-2 test?->No  Is this patient employed in healthcare?->No  Is the patient symptomatic as defined by the CDC?->No  Is the patient hospitalized?->Yes  Is the patient in the ICU?->No  Is the patient a resident in a congregate care setting?->No  Is the patient pregnant?->No    BLOOD CULTURE [[id number]] Collected: 11/03/20 1626    Order Status: Completed Specimen: Blood from Peripheral Updated: 11/08/20 1900     Significant Indicator NEG     Source BLD     Site  "PERIPHERAL     Culture Result No growth after 5 days of incubation.    Narrative:      Per Hospital Policy: Only change Specimen Src: to \"Line\" if  specified by physician order.  No site indicated    Aerobic/Anaerobic Culture (Surgery) [[id number]] Collected: 11/04/20 1730    Order Status: Completed Specimen: Wound from Peritoneal Fluid Updated: 11/08/20 0829     Significant Indicator NEG     Source WND     Site Peritoneal Drain     Culture Result Order placed in Surgery. Source appropriate culture orders  have been placed in Micro for testing.      Narrative:      Special Contact [id number] MAXINE Stevenson fluid from peritoneal drain  Special Contact [id number] MAXINE ELLIS    Anaerobic Culture [[id number]] Collected: 11/04/20 1730    Order Status: Completed Specimen: Wound Updated: 11/08/20 0829     Significant Indicator NEG     Source WND     Site Peritoneal Drain     Culture Result No Anaerobes isolated.    Narrative:      Special Contact [id number] MAXINE Stevenson fluid from peritoneal drain  Special Contact [id number] MAXINE ELLIS    CULTURE WOUND W/ GRAM STAIN [[id number]]  (Abnormal)  (Susceptibility) Collected: 11/04/20 1730    Order Status: Completed Specimen: Wound Updated: 11/08/20 0829     Significant Indicator POS     Source WND     Site Peritoneal Drain     Culture Result -     Gram Stain Result Rare WBCs.  Few Gram positive cocci.  Few Yeast.       Culture Result Enterococcus faecalis  Heavy growth        Candida albicans  Moderate growth      Narrative:      Special Contact [id number] MAXINE Stevenson fluid from peritoneal drain  Special Contact [id number] MAXINE ELLIS    Susceptibility     Enterococcus faecalis (1)     Antibiotic Interpretation Method Status    Daptomycin Sensitive ISAMAR Final    Ampicillin Sensitive ISAMAR Final    Gent Synergy Sensitive ISAMAR Final    Vancomycin Sensitive ISAMAR Final    Penicillin Sensitive ISAMAR Final                  " "   CULTURE STOOL [[id number]] Collected: 11/04/20 1609    Order Status: Completed Specimen: Stool Updated: 11/07/20 1608     Significant Indicator NEG     Source STL     Site STOOL     Culture Result Shiga Toxin testing not performed due to lack of growth in  [place] broth.  No enteric pathogens, only usual Gram positive enteric  joselin isolated.  NOTE:  Stool cultures are screened for Shiga Toxins 1 and 2,  Salmonella, Shigella, Campylobacter, Aeromonas,  Plesiomonas, and Vibrio.      Narrative:      Special Contact [id number] [facility]  Does this patient have risk factors for C-diff?->Yes  Special Contact [id number] [facility]    URINALYSIS [[id number]]  (Abnormal) Collected: 11/06/20 1235    Order Status: Completed Specimen: Urine, Clean Catch Updated: 11/06/20 1327     Color DK Yellow     Character Clear     Specific Gravity 1.019     Ph 5.0     Glucose Negative mg/dL      Ketones Negative mg/dL      Protein Negative mg/dL      Bilirubin Negative     Urobilinogen, Urine 0.2     Nitrite Negative     Leukocyte Esterase Trace     Occult Blood Negative     Micro Urine Req Microscopic    Narrative:      Collected By:[id number] MURDOCK    BLOOD CULTURE [[id number]]  (Abnormal) Collected: 11/03/20 1528    Order Status: Completed Specimen: Blood from Peripheral Updated: 11/06/20 1141     Significant Indicator POS     Source BLD     Site PERIPHERAL     Culture Result Growth detected by Bactec instrument.  11/04/2020  12:52      Viridans Streptococcus  Possible Contaminant  Isolated from one bottle only, correlate with clinical  condition.      Narrative:      CALL  [place]  183 tel. [phone],  CALLED  183 tel. [phone] 11/04/2020, 12:59, RB PERF. RESULTS CALLED TO:  [id number], RN  Per Hospital Policy: Only change Specimen Src: to \"Line\" if  specified by physician order.  No site indicated    BLOOD CULTURE [[id number]] Collected: 11/05/20 1950    Order Status: Completed Specimen: Blood from " "Peripheral Updated: 11/06/20 1025     Significant Indicator NEG     Source BLD     Site PERIPHERAL     Culture Result No Growth  Note: Blood cultures are incubated for 5 days and  are monitored continuously.Positive blood cultures  are called to the RN and reported as soon as  they are identified.      Narrative:      Per Hospital Policy: Only change Specimen Src: to \"Line\" if  specified by physician order.  Right AC    BLOOD CULTURE [[id number]] Collected: 11/05/20 1950    Order Status: Completed Specimen: Blood from Peripheral Updated: 11/06/20 1025     Significant Indicator NEG     Source BLD     Site PERIPHERAL     Culture Result No Growth  Note: Blood cultures are incubated for 5 days and  are monitored continuously.Positive blood cultures  are called to the RN and reported as soon as  they are identified.      Narrative:      Per Hospital Policy: Only change Specimen Src: to \"Line\" if  specified by physician order.  Right Hand    URINE CULTURE(NEW) [[id number]] Collected: 11/03/20 1707    Order Status: Completed Specimen: Urine Updated: 11/06/20 0749     Significant Indicator NEG     Source UR     Site -     Culture Result Mixed skin joselin <10,000 cfu/mL    Narrative:      Indication for culture:->Emergency Room Patient  Indication for culture:->Emergency Room Patient    GRAM STAIN [[id number]] Collected: 11/04/20 1730    Order Status: Completed Specimen: Wound Updated: 11/05/20 0137     Significant Indicator .     Source WND     Site Peritoneal Drain     Gram Stain Result Rare WBCs.  Few Gram positive cocci.  Few Yeast.      Narrative:      Special Contact [id number] MAXINE ELLIS  Brown fluid from peritoneal drain  Special Contact [id number] MAXINE ELLIS    C Diff by PCR rflx Toxin [[id number]] Collected: 11/04/20 1609    Order Status: Completed Specimen: Stool Updated: 11/04/20 2202     C Diff by PCR Negative     Comment: C. difficile NOT detected by PCR.  Treatment not indicated per " guidelines.  Repeat testing not indicated within 7 days.          027-NAP1-BI Presumptive Negative     Comment: Presumptive 027/NAP1/BI target DNA sequences are NOT DETECTED.       Narrative:      Special Contact [id number] EMIL PRITCHARD  Does this patient have risk factors for C-diff?->Yes    FLUID CULTURE W/GRAM STAIN [[id number]] Collected: 11/04/20 1730    Order Status: Canceled Specimen: Other from Peritoneal Fluid     URINALYSIS [[id number]]  (Abnormal) Collected: 11/03/20 1707    Order Status: Completed Specimen: Urine Updated: 11/03/20 1745     Color DK Yellow     Character Cloudy     Specific Gravity 1.026     Ph 5.0     Glucose Negative mg/dL      Ketones Trace mg/dL      Protein 30 mg/dL      Bilirubin Small     Urobilinogen, Urine 1.0     Nitrite Negative     Leukocyte Esterase Trace     Occult Blood Negative     Micro Urine Req Microscopic    Narrative:      Indication for culture:->Emergency Room Patient    Routine (COVID/SARS COV-2 In-House PCR up to 24 hours) [[id number]]     Order Status: Completed Specimen: Respirate from Nasopharyngeal           MEDS:  Current Facility-Administered Medications   Medication Last Admin   • furosemide (LASIX) tablet 20 mg Stopped at 11/10/20 0600   • NS infusion New Bag at 11/10/20 0351   • ondansetron (ZOFRAN) syringe/vial injection 4 mg 4 mg at 11/08/20 1240   • influenza vaccine quad injection 0.5 mL     • metoprolol SR (TOPROL XL) tablet 50 mg Stopped at 11/09/20 0600   • psyllium (METAMUCIL/KONSYL) 1 Packet 1 Packet at 11/10/20 0524   • linezolid (ZYVOX) tablet 600 mg 600 mg at 11/10/20 0523   • ondansetron (ZOFRAN ODT) dispertab 4 mg 4 mg at 11/08/20 0515   • fluconazole (DIFLUCAN) tablet 400 mg 400 mg at 11/10/20 0523   • potassium chloride SA (Kdur) tablet 40 mEq 40 mEq at 11/10/20 0524   • oxyCODONE-acetaminophen (PERCOCET) 5-325 MG per tablet 1-2 Tab 2 Tab at 11/09/20 2120   • [Held by provider] lisinopril (PRINIVIL) tablet 10 mg Stopped at 11/09/20  0600   • OLANZapine (ZYPREXA) tablet 7.5 mg 7.5 mg at 11/09/20 2026   • sertraline (Zoloft) tablet 125 mg 125 mg at 11/10/20 0523   • [Held by provider] spironolactone (ALDACTONE) tablet 25 mg Stopped at 11/09/20 0600   • insulin regular (HumuLIN R,NovoLIN R) injection Stopped at 11/06/20 1800    And   • glucose 4 g chewable tablet 16 g      And   • dextrose 50% (D50W) injection 50 mL     • labetalol (NORMODYNE/TRANDATE) injection 10 mg         PROBLEM LIST:  No problems updated.    ASSESSMENT/PLAN: 55 y.o. male admitted for perisplenic abscess and bacteremia. Followed by ID and general surgery.    #Perisplenic abscess s/p BETTE drain, growing Enterococcus faecalis and candida albicans  #Gram Positive cocci bacteremia with strep  20mL output of BETTE drain, murky brown color.  CT: AP on 11/8 shows interval decrease in size of perisplenic abscess; likely has fistulous communication with abutting splenic flexure of colon.   Wound culture enterococcus faecalis sensitive to Ampicillin, Daptomycin, Gent Synergy, Penicillin, and Vancomycin.  - General Surgery following, appreciate recs   - to OR today for likely colectomy and possible ostomy   - f/u post-op plan  - ID following, appreciate recs   -DC ceftriaxone 11/9 as does not treat enterococcus and no GNR found   - cont linezolid and fluconazole   - Patient with allergies to daptomycin and penicillin     #Diarrhea, likely 2/2 Abx  Recent abx use, recent hospitalization. C. diff negative. Continue to monitor.  Stool culture negative.  - Hold immodium due to concern for worsening infection  - cont to evaluate post-operatively     #Atrial fibrillation with RVR  No anticoagulation. CHADSVASC of 3. Does not appear he has been following up with cardiology regularly, though he has seen a cardiologist.   Cardiology consulted 11/8 and explained that increased HR is physiological compensation to maintain adequate cardiac output in the setting of infection with CHF. Patient has reduced  EF, and tachycardia is natural response to infection. For now, he's hemodynamically stable.  Seen by cardiac electrophysiologist in 2019 and deemed not to be good candidate for anticoagulation due to frequent falls 2/2 orthostatic hypotension.  - Appreciate cardiology recs to continue Metoprolol 50mg daily, spironolactone, and lisinopril     - recommend repeat future limited transthoracic echocardiogram to accurately assess LV function once sepsis / bacteremia / infection resolves   - Consider anticoagulation after surgery removes drain, although will likely remain poor candidate given h/o significant falls     #Troponinemia  #History of prolonged QTc interval  #CHFrEF  #New onset hypotension  Troponin elevated to his baseline, patient reports compliance with home metoprolol. Last echocardiogram on file from September 2020 with LVEF of 30%.  EKG showed QT of 496.  Patient persistently hypotensive, to as low as 80s systolic. Asymptomatic. Hx of orthostatic hypotension.   - Use medications that prolong QT with caution (Zofran, Abx)  - Holding Lisinopril and spironolactone  - Reduced lasix to 20 mg daily with hold parameters  - will need outpt CÉSAR     #Hyponatremia, resolved   #Hypokalemia, resolved  - Replete PRN  - CTM     #Elevated lipase - resolved  No evidence of pancreatitis on CT imaging.     - 156>>25 on 11/7.     # Microcytic anemia, mild  Likely iron deficient anemia versus anemia of chronic disease.  Stable.  - To follow on outpatient basis     # Diabetes mellitus type 2  Patient states blood sugar well controlled at home with metformin. Last a1c of 7.7 on 10/22/19  POC glucose 100s - 140s.  - Sliding scale insulin with hypoglycemia protocol  - Ensure strict glucose control for proper infection control, <180     # UTI with dysuria  UA positive leukocyte esterase, negative nitrites.    - Continue Abx per ID    Lines: PIV  Tubes: BETTE drain  Abx:  Fluconazole, linezolid  DVT prophylaxis: SCDs  Code Status:  Full    Disposition: Inpatient for resolution of perisplenic abscess    Vilma Horvath MD   PGY-4 Family Medicine Resident   [facility]

## 2020-11-11 ENCOUNTER — APPOINTMENT (OUTPATIENT)
Dept: CARDIOLOGY | Facility: MEDICAL CENTER | Age: 55
DRG: 329 | End: 2020-11-11
Attending: SURGERY
Payer: MEDICAID

## 2020-11-11 ENCOUNTER — APPOINTMENT (OUTPATIENT)
Dept: RADIOLOGY | Facility: MEDICAL CENTER | Age: 55
DRG: 329 | End: 2020-11-11
Attending: SURGERY
Payer: MEDICAID

## 2020-11-11 PROBLEM — N17.9 AKI (ACUTE KIDNEY INJURY) (HCC): Status: ACTIVE | Noted: 2020-11-11

## 2020-11-11 LAB
ALBUMIN SERPL BCP-MCNC: 2.2 G/DL (ref 3.2–4.9)
ALBUMIN/GLOB SERPL: 0.5 G/DL
ALP SERPL-CCNC: 95 U/L (ref 30–99)
ALT SERPL-CCNC: 13 U/L (ref 2–50)
ANION GAP SERPL CALC-SCNC: 12 MMOL/L (ref 7–16)
ANION GAP SERPL CALC-SCNC: 12 MMOL/L (ref 7–16)
ANION GAP SERPL CALC-SCNC: 14 MMOL/L (ref 7–16)
ANION GAP SERPL CALC-SCNC: 15 MMOL/L (ref 7–16)
ANISOCYTOSIS BLD QL SMEAR: ABNORMAL
AST SERPL-CCNC: 25 U/L (ref 12–45)
BASOPHILS # BLD AUTO: 0.5 % (ref 0–1.8)
BASOPHILS # BLD: 0.08 K/UL (ref 0–0.12)
BILIRUB SERPL-MCNC: 0.5 MG/DL (ref 0.1–1.5)
BUN SERPL-MCNC: 29 MG/DL (ref 8–22)
BUN SERPL-MCNC: 30 MG/DL (ref 8–22)
BUN SERPL-MCNC: 30 MG/DL (ref 8–22)
BUN SERPL-MCNC: 32 MG/DL (ref 8–22)
BURR CELLS BLD QL SMEAR: NORMAL
CALCIUM SERPL-MCNC: 8.3 MG/DL (ref 8.5–10.5)
CALCIUM SERPL-MCNC: 8.3 MG/DL (ref 8.5–10.5)
CALCIUM SERPL-MCNC: 8.5 MG/DL (ref 8.5–10.5)
CALCIUM SERPL-MCNC: 8.8 MG/DL (ref 8.5–10.5)
CHLORIDE SERPL-SCNC: 101 MMOL/L (ref 96–112)
CHLORIDE SERPL-SCNC: 102 MMOL/L (ref 96–112)
CHLORIDE SERPL-SCNC: 104 MMOL/L (ref 96–112)
CHLORIDE SERPL-SCNC: 105 MMOL/L (ref 96–112)
CO2 SERPL-SCNC: 14 MMOL/L (ref 20–33)
CO2 SERPL-SCNC: 15 MMOL/L (ref 20–33)
CO2 SERPL-SCNC: 16 MMOL/L (ref 20–33)
CO2 SERPL-SCNC: 16 MMOL/L (ref 20–33)
COMMENT 1642: NORMAL
CREAT SERPL-MCNC: 2.19 MG/DL (ref 0.5–1.4)
CREAT SERPL-MCNC: 2.22 MG/DL (ref 0.5–1.4)
CREAT SERPL-MCNC: 2.32 MG/DL (ref 0.5–1.4)
CREAT SERPL-MCNC: 2.42 MG/DL (ref 0.5–1.4)
CREAT UR-MCNC: 219.98 MG/DL
EKG IMPRESSION: NORMAL
EOSINOPHIL # BLD AUTO: 0.08 K/UL (ref 0–0.51)
EOSINOPHIL NFR BLD: 0.5 % (ref 0–6.9)
ERYTHROCYTE [DISTWIDTH] IN BLOOD BY AUTOMATED COUNT: 57.1 FL (ref 35.9–50)
GLOBULIN SER CALC-MCNC: 4.1 G/DL (ref 1.9–3.5)
GLUCOSE BLD-MCNC: 138 MG/DL (ref 65–99)
GLUCOSE BLD-MCNC: 166 MG/DL (ref 65–99)
GLUCOSE BLD-MCNC: 166 MG/DL (ref 65–99)
GLUCOSE BLD-MCNC: 173 MG/DL (ref 65–99)
GLUCOSE BLD-MCNC: 186 MG/DL (ref 65–99)
GLUCOSE SERPL-MCNC: 159 MG/DL (ref 65–99)
GLUCOSE SERPL-MCNC: 163 MG/DL (ref 65–99)
GLUCOSE SERPL-MCNC: 166 MG/DL (ref 65–99)
GLUCOSE SERPL-MCNC: 198 MG/DL (ref 65–99)
HCT VFR BLD AUTO: 42.2 % (ref 42–52)
HGB BLD-MCNC: 12.5 G/DL (ref 14–18)
HYPOCHROMIA BLD QL SMEAR: ABNORMAL
IMM GRANULOCYTES # BLD AUTO: 0.32 K/UL (ref 0–0.11)
IMM GRANULOCYTES NFR BLD AUTO: 2.1 % (ref 0–0.9)
LACTATE BLD-SCNC: 1.8 MMOL/L (ref 0.5–2)
LV EJECT FRACT MOD 4C 99902: 34.7
LYMPHOCYTES # BLD AUTO: 0.96 K/UL (ref 1–4.8)
LYMPHOCYTES NFR BLD: 6.2 % (ref 22–41)
MCH RBC QN AUTO: 25.4 PG (ref 27–33)
MCHC RBC AUTO-ENTMCNC: 29.6 G/DL (ref 33.7–35.3)
MCV RBC AUTO: 85.8 FL (ref 81.4–97.8)
MONOCYTES # BLD AUTO: 0.85 K/UL (ref 0–0.85)
MONOCYTES NFR BLD AUTO: 5.5 % (ref 0–13.4)
MORPHOLOGY BLD-IMP: NORMAL
NEUTROPHILS # BLD AUTO: 13.15 K/UL (ref 1.82–7.42)
NEUTROPHILS NFR BLD: 85.2 % (ref 44–72)
NRBC # BLD AUTO: 0.05 K/UL
NRBC BLD-RTO: 0.3 /100 WBC
PLATELET # BLD AUTO: 504 K/UL (ref 164–446)
PLATELET BLD QL SMEAR: NORMAL
PMV BLD AUTO: 10.1 FL (ref 9–12.9)
POIKILOCYTOSIS BLD QL SMEAR: NORMAL
POTASSIUM SERPL-SCNC: 6.1 MMOL/L (ref 3.6–5.5)
POTASSIUM SERPL-SCNC: 6.3 MMOL/L (ref 3.6–5.5)
POTASSIUM SERPL-SCNC: 6.3 MMOL/L (ref 3.6–5.5)
POTASSIUM SERPL-SCNC: 6.7 MMOL/L (ref 3.6–5.5)
PROT SERPL-MCNC: 6.3 G/DL (ref 6–8.2)
RBC # BLD AUTO: 4.92 M/UL (ref 4.7–6.1)
RBC BLD AUTO: PRESENT
SODIUM SERPL-SCNC: 129 MMOL/L (ref 135–145)
SODIUM SERPL-SCNC: 130 MMOL/L (ref 135–145)
SODIUM SERPL-SCNC: 133 MMOL/L (ref 135–145)
SODIUM SERPL-SCNC: 134 MMOL/L (ref 135–145)
SODIUM UR-SCNC: <20 MMOL/L
WBC # BLD AUTO: 15.4 K/UL (ref 4.8–10.8)

## 2020-11-11 PROCEDURE — 82570 ASSAY OF URINE CREATININE: CPT

## 2020-11-11 PROCEDURE — 770022 HCHG ROOM/CARE - ICU (200)

## 2020-11-11 PROCEDURE — 97161 PT EVAL LOW COMPLEX 20 MIN: CPT

## 2020-11-11 PROCEDURE — 76775 US EXAM ABDO BACK WALL LIM: CPT

## 2020-11-11 PROCEDURE — 80048 BASIC METABOLIC PNL TOTAL CA: CPT | Mod: 91

## 2020-11-11 PROCEDURE — 99233 SBSQ HOSP IP/OBS HIGH 50: CPT | Performed by: INTERNAL MEDICINE

## 2020-11-11 PROCEDURE — 700101 HCHG RX REV CODE 250: Performed by: SURGERY

## 2020-11-11 PROCEDURE — A9270 NON-COVERED ITEM OR SERVICE: HCPCS | Performed by: STUDENT IN AN ORGANIZED HEALTH CARE EDUCATION/TRAINING PROGRAM

## 2020-11-11 PROCEDURE — 302098 PASTE RING (FLAT): Performed by: SURGERY

## 2020-11-11 PROCEDURE — 700105 HCHG RX REV CODE 258: Performed by: SURGERY

## 2020-11-11 PROCEDURE — 93005 ELECTROCARDIOGRAM TRACING: CPT | Performed by: SURGERY

## 2020-11-11 PROCEDURE — A9270 NON-COVERED ITEM OR SERVICE: HCPCS | Performed by: SURGERY

## 2020-11-11 PROCEDURE — P9047 ALBUMIN (HUMAN), 25%, 50ML: HCPCS | Performed by: SURGERY

## 2020-11-11 PROCEDURE — 99291 CRITICAL CARE FIRST HOUR: CPT | Performed by: SURGERY

## 2020-11-11 PROCEDURE — 51798 US URINE CAPACITY MEASURE: CPT

## 2020-11-11 PROCEDURE — 700102 HCHG RX REV CODE 250 W/ 637 OVERRIDE(OP): Performed by: SURGERY

## 2020-11-11 PROCEDURE — 94669 MECHANICAL CHEST WALL OSCILL: CPT

## 2020-11-11 PROCEDURE — 302111 WAFER OST 2.25IN N IMG RD 2 PC (BARRIER): Performed by: SURGERY

## 2020-11-11 PROCEDURE — 700111 HCHG RX REV CODE 636 W/ 250 OVERRIDE (IP): Performed by: SURGERY

## 2020-11-11 PROCEDURE — 93306 TTE W/DOPPLER COMPLETE: CPT | Mod: 26 | Performed by: INTERNAL MEDICINE

## 2020-11-11 PROCEDURE — 85025 COMPLETE CBC W/AUTO DIFF WBC: CPT

## 2020-11-11 PROCEDURE — 82962 GLUCOSE BLOOD TEST: CPT | Mod: 91

## 2020-11-11 PROCEDURE — 700102 HCHG RX REV CODE 250 W/ 637 OVERRIDE(OP): Performed by: INTERNAL MEDICINE

## 2020-11-11 PROCEDURE — 93010 ELECTROCARDIOGRAM REPORT: CPT | Performed by: INTERNAL MEDICINE

## 2020-11-11 PROCEDURE — 302102 BAG OST N IMG 2.25IN 2PC (FECAL): Performed by: SURGERY

## 2020-11-11 PROCEDURE — 97166 OT EVAL MOD COMPLEX 45 MIN: CPT

## 2020-11-11 PROCEDURE — 700117 HCHG RX CONTRAST REV CODE 255: Performed by: SURGERY

## 2020-11-11 PROCEDURE — 700102 HCHG RX REV CODE 250 W/ 637 OVERRIDE(OP): Performed by: STUDENT IN AN ORGANIZED HEALTH CARE EDUCATION/TRAINING PROGRAM

## 2020-11-11 PROCEDURE — 80053 COMPREHEN METABOLIC PANEL: CPT

## 2020-11-11 PROCEDURE — 83605 ASSAY OF LACTIC ACID: CPT

## 2020-11-11 PROCEDURE — 84300 ASSAY OF URINE SODIUM: CPT

## 2020-11-11 PROCEDURE — 93306 TTE W/DOPPLER COMPLETE: CPT

## 2020-11-11 PROCEDURE — A9270 NON-COVERED ITEM OR SERVICE: HCPCS | Performed by: INTERNAL MEDICINE

## 2020-11-11 RX ORDER — SODIUM POLYSTYRENE SULFONATE 15 G/60ML
15 SUSPENSION ORAL; RECTAL ONCE
Status: COMPLETED | OUTPATIENT
Start: 2020-11-11 | End: 2020-11-11

## 2020-11-11 RX ORDER — ALBUMIN (HUMAN) 12.5 G/50ML
50 SOLUTION INTRAVENOUS EVERY 4 HOURS
Status: COMPLETED | OUTPATIENT
Start: 2020-11-11 | End: 2020-11-12

## 2020-11-11 RX ORDER — DIGOXIN 0.25 MG/ML
250 INJECTION INTRAMUSCULAR; INTRAVENOUS DAILY
Status: COMPLETED | OUTPATIENT
Start: 2020-11-11 | End: 2020-11-13

## 2020-11-11 RX ORDER — DEXTROSE MONOHYDRATE 25 G/50ML
50 INJECTION, SOLUTION INTRAVENOUS ONCE
Status: COMPLETED | OUTPATIENT
Start: 2020-11-11 | End: 2020-11-11

## 2020-11-11 RX ORDER — METRONIDAZOLE 500 MG/1
500 TABLET ORAL EVERY 8 HOURS
Status: DISCONTINUED | OUTPATIENT
Start: 2020-11-11 | End: 2020-11-13

## 2020-11-11 RX ORDER — SODIUM BICARBONATE IN D5W 150/1000ML
PLASTIC BAG, INJECTION (ML) INTRAVENOUS CONTINUOUS
Status: DISCONTINUED | OUTPATIENT
Start: 2020-11-11 | End: 2020-11-13

## 2020-11-11 RX ADMIN — METRONIDAZOLE 500 MG: 500 TABLET ORAL at 15:15

## 2020-11-11 RX ADMIN — SODIUM CHLORIDE: 9 INJECTION, SOLUTION INTRAVENOUS at 09:11

## 2020-11-11 RX ADMIN — CEFEPIME 1 G: 1 INJECTION, POWDER, FOR SOLUTION INTRAMUSCULAR; INTRAVENOUS at 13:21

## 2020-11-11 RX ADMIN — INSULIN HUMAN 1 UNITS: 100 INJECTION, SOLUTION PARENTERAL at 13:13

## 2020-11-11 RX ADMIN — LINEZOLID 600 MG: 600 TABLET, FILM COATED ORAL at 17:14

## 2020-11-11 RX ADMIN — CEFEPIME 1 G: 1 INJECTION, POWDER, FOR SOLUTION INTRAMUSCULAR; INTRAVENOUS at 21:28

## 2020-11-11 RX ADMIN — AMIODARONE HYDROCHLORIDE 1 MG/MIN: 1.8 INJECTION, SOLUTION INTRAVENOUS at 00:53

## 2020-11-11 RX ADMIN — NOREPINEPHRINE BITARTRATE 15 MCG/MIN: 1 INJECTION INTRAVENOUS at 08:59

## 2020-11-11 RX ADMIN — SODIUM CHLORIDE, POTASSIUM CHLORIDE, SODIUM LACTATE AND CALCIUM CHLORIDE 500 ML: 600; 310; 30; 20 INJECTION, SOLUTION INTRAVENOUS at 18:42

## 2020-11-11 RX ADMIN — CALCIUM CHLORIDE 1000 MG: 100 INJECTION, SOLUTION INTRAVENOUS at 10:19

## 2020-11-11 RX ADMIN — HYDROCORTISONE SODIUM SUCCINATE 50 MG: 100 INJECTION, POWDER, FOR SOLUTION INTRAMUSCULAR; INTRAVENOUS at 04:44

## 2020-11-11 RX ADMIN — INSULIN HUMAN 1 UNITS: 100 INJECTION, SOLUTION PARENTERAL at 17:23

## 2020-11-11 RX ADMIN — METRONIDAZOLE 500 MG: 500 INJECTION, SOLUTION INTRAVENOUS at 04:45

## 2020-11-11 RX ADMIN — SODIUM CHLORIDE, POTASSIUM CHLORIDE, SODIUM LACTATE AND CALCIUM CHLORIDE 500 ML: 600; 310; 30; 20 INJECTION, SOLUTION INTRAVENOUS at 05:04

## 2020-11-11 RX ADMIN — INSULIN HUMAN 10 UNITS: 100 INJECTION, SOLUTION PARENTERAL at 10:01

## 2020-11-11 RX ADMIN — METOPROLOL SUCCINATE 50 MG: 50 TABLET, EXTENDED RELEASE ORAL at 04:44

## 2020-11-11 RX ADMIN — HYDROCORTISONE SODIUM SUCCINATE 50 MG: 100 INJECTION, POWDER, FOR SOLUTION INTRAMUSCULAR; INTRAVENOUS at 13:16

## 2020-11-11 RX ADMIN — FENTANYL CITRATE 50 MCG: 50 INJECTION, SOLUTION INTRAMUSCULAR; INTRAVENOUS at 08:39

## 2020-11-11 RX ADMIN — SODIUM CHLORIDE: 9 INJECTION, SOLUTION INTRAVENOUS at 17:13

## 2020-11-11 RX ADMIN — FLUCONAZOLE 400 MG: 200 TABLET ORAL at 04:44

## 2020-11-11 RX ADMIN — AMIODARONE HYDROCHLORIDE 0.5 MG/MIN: 1.8 INJECTION, SOLUTION INTRAVENOUS at 12:40

## 2020-11-11 RX ADMIN — OLANZAPINE 7.5 MG: 5 TABLET, FILM COATED ORAL at 21:28

## 2020-11-11 RX ADMIN — METRONIDAZOLE 500 MG: 500 TABLET ORAL at 21:28

## 2020-11-11 RX ADMIN — FENTANYL CITRATE 50 MCG: 50 INJECTION, SOLUTION INTRAMUSCULAR; INTRAVENOUS at 01:38

## 2020-11-11 RX ADMIN — DIGOXIN 250 MCG: 0.25 INJECTION INTRAMUSCULAR; INTRAVENOUS at 17:09

## 2020-11-11 RX ADMIN — SODIUM POLYSTYRENE SULFONATE 15 G: 15 SUSPENSION ORAL; RECTAL at 14:14

## 2020-11-11 RX ADMIN — SODIUM CHLORIDE, POTASSIUM CHLORIDE, SODIUM LACTATE AND CALCIUM CHLORIDE 500 ML: 600; 310; 30; 20 INJECTION, SOLUTION INTRAVENOUS at 11:07

## 2020-11-11 RX ADMIN — ALBUMIN (HUMAN) 50 G: 5 SOLUTION INTRAVENOUS at 17:32

## 2020-11-11 RX ADMIN — VASOPRESSIN 0.03 UNITS/MIN: 20 INJECTION INTRAVENOUS at 07:41

## 2020-11-11 RX ADMIN — HYDROCORTISONE SODIUM SUCCINATE 50 MG: 100 INJECTION, POWDER, FOR SOLUTION INTRAMUSCULAR; INTRAVENOUS at 00:22

## 2020-11-11 RX ADMIN — PSYLLIUM HUSK 1 PACKET: 3.4 POWDER ORAL at 04:45

## 2020-11-11 RX ADMIN — SODIUM CHLORIDE, POTASSIUM CHLORIDE, SODIUM LACTATE AND CALCIUM CHLORIDE 500 ML: 600; 310; 30; 20 INJECTION, SOLUTION INTRAVENOUS at 15:16

## 2020-11-11 RX ADMIN — CEFEPIME 1 G: 1 INJECTION, POWDER, FOR SOLUTION INTRAMUSCULAR; INTRAVENOUS at 04:44

## 2020-11-11 RX ADMIN — VASOPRESSIN 0.03 UNITS/MIN: 20 INJECTION INTRAVENOUS at 19:00

## 2020-11-11 RX ADMIN — HYDROCORTISONE SODIUM SUCCINATE 50 MG: 100 INJECTION, POWDER, FOR SOLUTION INTRAMUSCULAR; INTRAVENOUS at 17:11

## 2020-11-11 RX ADMIN — SODIUM BICARBONATE: 84 INJECTION, SOLUTION INTRAVENOUS at 19:45

## 2020-11-11 RX ADMIN — ALBUMIN (HUMAN) 50 G: 5 SOLUTION INTRAVENOUS at 19:45

## 2020-11-11 RX ADMIN — CALCIUM CHLORIDE 1000 MG: 100 INJECTION, SOLUTION INTRAVENOUS at 18:59

## 2020-11-11 RX ADMIN — SERTRALINE 125 MG: 100 TABLET, FILM COATED ORAL at 04:44

## 2020-11-11 RX ADMIN — INSULIN HUMAN 1 UNITS: 100 INJECTION, SOLUTION PARENTERAL at 05:02

## 2020-11-11 RX ADMIN — SODIUM CHLORIDE, POTASSIUM CHLORIDE, SODIUM LACTATE AND CALCIUM CHLORIDE 500 ML: 600; 310; 30; 20 INJECTION, SOLUTION INTRAVENOUS at 22:02

## 2020-11-11 RX ADMIN — LINEZOLID 600 MG: 600 TABLET, FILM COATED ORAL at 04:44

## 2020-11-11 RX ADMIN — DEXTROSE MONOHYDRATE 50 ML: 25 INJECTION, SOLUTION INTRAVENOUS at 09:58

## 2020-11-11 RX ADMIN — FENTANYL CITRATE 50 MCG: 50 INJECTION, SOLUTION INTRAMUSCULAR; INTRAVENOUS at 17:29

## 2020-11-11 RX ADMIN — HUMAN ALBUMIN MICROSPHERES AND PERFLUTREN 3 ML: 10; .22 INJECTION, SOLUTION INTRAVENOUS at 12:18

## 2020-11-11 ASSESSMENT — ENCOUNTER SYMPTOMS
NAUSEA: 0
MYALGIAS: 0
COUGH: 0
DIARRHEA: 0
SENSORY CHANGE: 0
SHORTNESS OF BREATH: 1
SPUTUM PRODUCTION: 0
FEVER: 0
CHILLS: 0
ABDOMINAL PAIN: 1
VOMITING: 0
CONSTIPATION: 0
TREMORS: 0

## 2020-11-11 ASSESSMENT — GAIT ASSESSMENTS
ASSISTIVE DEVICE: NONE;HAND HELD ASSIST
GAIT LEVEL OF ASSIST: MAXIMAL ASSIST
DISTANCE (FEET): 2

## 2020-11-11 ASSESSMENT — COGNITIVE AND FUNCTIONAL STATUS - GENERAL
CLIMB 3 TO 5 STEPS WITH RAILING: TOTAL
HELP NEEDED FOR BATHING: A LOT
STANDING UP FROM CHAIR USING ARMS: A LOT
DRESSING REGULAR LOWER BODY CLOTHING: A LOT
MOBILITY SCORE: 11
MOVING FROM LYING ON BACK TO SITTING ON SIDE OF FLAT BED: A LITTLE
DAILY ACTIVITIY SCORE: 17
MOVING TO AND FROM BED TO CHAIR: A LOT
TURNING FROM BACK TO SIDE WHILE IN FLAT BAD: UNABLE
DRESSING REGULAR UPPER BODY CLOTHING: A LITTLE
WALKING IN HOSPITAL ROOM: A LOT
SUGGESTED CMS G CODE MODIFIER MOBILITY: CL
SUGGESTED CMS G CODE MODIFIER DAILY ACTIVITY: CK
TOILETING: A LOT

## 2020-11-11 ASSESSMENT — PAIN DESCRIPTION - PAIN TYPE
TYPE: ACUTE PAIN
TYPE: ACUTE PAIN;SURGICAL PAIN
TYPE: ACUTE PAIN

## 2020-11-11 ASSESSMENT — ACTIVITIES OF DAILY LIVING (ADL): TOILETING: INDEPENDENT

## 2020-11-11 NOTE — THERAPY
"Physical Therapy   Initial Evaluation     Patient Name: Sebastián Castro  Age:  55 y.o., Sex:  male  Medical Record #: [id number]  Today's Date: 11/11/2020     Precautions: Fall Risk    Assessment  Patient is 55 y.o. male w/ hx significant for obesity, CHF, DM2, and a recent fall about one month ago.  Since then he reports \"not feeling right\".  He is admitted w/ inability to keep food down.  Found to have a large bowel perferation s/p colectomy/colostomy.  Also w/ subcapsular splenic hematoma/abscess, s/p drainage.  He was residing in a two story group home, however is not accepted back, as he was having increasing difficulty w/ stairs.  Per chart review, he has fortunately been accepted to a single story group home.  He was ambulatory w/ a cane.  Today, he presents very motivated.  He needs mod assist to sit eob, max to return.  He needs min assist to stand and mod to maintain secondary to posterior lean and left foot \"slipping\" on the floor.  Of note, is there is a small, very slippery spot on the floor.  Nsg notified and put call into evs for possible solution.  Again, he is very motivated, and despite his body habitus and recent surgeries move surprisingly well.  That being said, he may be best served w/ post acute therapy before returning to his new group home.    Plan    Recommend Physical Therapy 3 times per week until therapy goals are met for the following treatments:  Bed Mobility, Gait Training and Therapeutic Activities    DC Equipment Recommendations: Unable to determine at this time  Discharge Recommendations: Recommend post-acute placement for additional physical therapy services prior to discharge home       Objective       11/11/20 0913   Prior Living Situation   Housing / Facility 2 Story House   Steps Into Home 2   Steps In Home 15   Equipment Owned Single Point Cane   Comments Pt resides in a group home   Prior Level of Functional Mobility   Bed Mobility Independent   Transfer Status Independent "   Ambulation Independent   Assistive Devices Used Single Point Cane   Stairs Independent   History of Falls   Date of Last Fall   (one month prior to admit)   Balance Assessment   Sitting Balance (Static) Fair   Sitting Balance (Dynamic) Fair -   Standing Balance (Static) Poor +   Standing Balance (Dynamic) Poor   Weight Shift Sitting Fair   Weight Shift Standing Poor   Gait Analysis   Gait Level Of Assist Maximal Assist   Assistive Device None;Hand Held Assist   Distance (Feet) 2   Comments pt able to take several sidesteps   Bed Mobility    Supine to Sit Moderate Assist   Sit to Supine Maximal Assist   Rolling Maximum Assist to Lt.;Maximal Assist to Rt.   Functional Mobility   Sit to Stand Minimal Assist   Bed, Chair, Wheelchair Transfer Unable to Participate   Short Term Goals    Short Term Goal # 1 Pt to move supine to/from eob, no rails, w/ spv in 6 visits to improve fxl indep   Short Term Goal # 2 Pt to move sit to/from stand w/ spv in 6 visits w/ spv in 6 visits to improve fxl indep   Short Term Goal # 3 Pt to ambulate 150 ft w/ spv in 6 visits to improve fxl indep   Anticipated Discharge Equipment and Recommendations   DC Equipment Recommendations Unable to determine at this time   Discharge Recommendations Recommend post-acute placement for additional physical therapy services prior to discharge home

## 2020-11-11 NOTE — PROGRESS NOTES
Updated Dr. Maxwell, only 5 mL urine since 500 mL bolus.  Orders received for repeat 500 mL bolus.

## 2020-11-11 NOTE — ASSESSMENT & PLAN NOTE
11/3 Ceftriaxone and metronidazole initiated  11/5 Fluconazole initiated  11/7 Linezolid initiated  11/8 Drain with Candida albicans and Enterococcus faecalis  11/9 Ceftriaxone discontinued  11/10 Cefepime initiated  - Septic shock in the post operative period, vasopressors initiated   11/12 Weaned off vasopressors  11/13 Cefepime and flagyl discontinued  Antibiotic Management by [facility] physicians

## 2020-11-11 NOTE — ASSESSMENT & PLAN NOTE
Recurrent    11/8 Cardiology consult  11/10 Amiodarone drip initiated   11/11 Added digoxin   11/12 Stopped amiodarone   11/14 Digoxin dose decreased   Sirena Larson MD.  Cardiology (sign off)

## 2020-11-11 NOTE — ASSESSMENT & PLAN NOTE
Perforated colon at splenic flexure   11/10 segmental colectomy  Diversion colostomy and drainage

## 2020-11-11 NOTE — PROGRESS NOTES
Trauma / Surgical Daily Progress Note    Date of Service  11/10/2020    Chief Complaint  55 y.o. male admitted 11/3/2020 with Intraabdominal fluid collection    Interval Events  Admitted to ICU post laparotomy with segmental colon resection and diverting colostomy.  Abdominal wall cellulitis associated with intra-abdominal abscess and perforated colon.  Patient is in septic shock on vasopressors with underlying cardiomyopathy and reduced ejection fraction  Changing Addy-Synephrine for norepinephrine infusion and continued judicious fluid resuscitation  Extubated  Significant risk for deterioration complications and death  Critical in ICU    Review of Systems  Review of Systems   Unable to perform ROS: Mental status change        Vital Signs for last 24 hours  Temp:  [36.1 °C (96.9 °F)-36.7 °C (98 °F)] 36.4 °C (97.6 °F)  Pulse:  [] 132  Resp:  [12-45] 40  BP: ()/(37-77) 116/67  SpO2:  [90 %-98 %] 97 %    Hemodynamic parameters for last 24 hours       Respiratory Data     Respiration: (!) 40, Pulse Oximetry: 97 %     Work Of Breathing / Effort: Mild;Moderate;Shallow  RUL Breath Sounds: Diminished, RML Breath Sounds: Diminished, RLL Breath Sounds: Diminished, ELIAS Breath Sounds: Diminished, LLL Breath Sounds: Diminished    Physical Exam  Physical Exam  Vitals signs and nursing note reviewed. Exam conducted with a chaperone present.   Constitutional:       General: He is in acute distress.      Appearance: He is obese. He is ill-appearing and toxic-appearing.   HENT:      Head: Normocephalic.   Eyes:      Pupils: Pupils are equal, round, and reactive to light.   Cardiovascular:      Rate and Rhythm: Regular rhythm. Tachycardia present.      Pulses: Normal pulses.      Comments: hypotensive  Pulmonary:      Effort: Pulmonary effort is normal.   Abdominal:      General: There is distension.      Tenderness: There is abdominal tenderness. There is guarding.      Comments: Colostomy  Viable   todd bloody   Wound vac    Genitourinary:     Comments: Zabala to dd  Musculoskeletal: Normal range of motion.   Skin:     General: Skin is warm.      Capillary Refill: Capillary refill takes 2 to 3 seconds.   Neurological:      Mental Status: He is disoriented.      Cranial Nerves: No cranial nerve deficit.         Laboratory  Recent Results (from the past 24 hour(s))   ACCU-CHEK GLUCOSE    Collection Time: 11/09/20  4:40 PM   Result Value Ref Range    Glucose - Accu-Ck 126 (H) 65 - 99 mg/dL   COVID/SARS CoV-2 PCR    Collection Time: 11/09/20  6:55 PM    Specimen: Nasopharyngeal; Respirate   Result Value Ref Range    COVID Order Status Received    SARS-CoV-2, PCR (In-House)    Collection Time: 11/09/20  6:55 PM   Result Value Ref Range    SARS-CoV-2 Source NP Swab     SARS-CoV-2 by PCR NotDetected    ACCU-CHEK GLUCOSE    Collection Time: 11/10/20 12:12 AM   Result Value Ref Range    Glucose - Accu-Ck 103 (H) 65 - 99 mg/dL   Basic Metabolic Panel    Collection Time: 11/10/20  3:31 AM   Result Value Ref Range    Sodium 131 (L) 135 - 145 mmol/L    Potassium 5.2 3.6 - 5.5 mmol/L    Chloride 101 96 - 112 mmol/L    Co2 19 (L) 20 - 33 mmol/L    Glucose 94 65 - 99 mg/dL    Bun 31 (H) 8 - 22 mg/dL    Creatinine 1.95 (H) 0.50 - 1.40 mg/dL    Calcium 8.8 8.5 - 10.5 mg/dL    Anion Gap 11.0 7.0 - 16.0   ESTIMATED GFR    Collection Time: 11/10/20  3:31 AM   Result Value Ref Range    GFR If  43 (A) >60 mL/min/1.73 m 2    GFR If Non  36 (A) >60 mL/min/1.73 m 2   HEMOGLOBIN A1C    Collection Time: 11/10/20  3:31 AM   Result Value Ref Range    Glycohemoglobin 7.8 (H) 0.0 - 5.6 %    Est Avg Glucose 177 mg/dL   ACCU-CHEK GLUCOSE    Collection Time: 11/10/20  5:22 AM   Result Value Ref Range    Glucose - Accu-Ck 84 65 - 99 mg/dL   COD - Adult (Type and Screen)    Collection Time: 11/10/20  8:13 AM   Result Value Ref Range    ABO Grouping Only O     Rh Grouping Only POS     Antibody Screen-Cod NEG    CBC WITH DIFFERENTIAL     Collection Time: 11/10/20 11:29 AM   Result Value Ref Range    WBC 6.0 4.8 - 10.8 K/uL    RBC 5.01 4.70 - 6.10 M/uL    Hemoglobin 12.4 (L) 14.0 - 18.0 g/dL    Hematocrit 42.5 42.0 - 52.0 %    MCV 84.8 81.4 - 97.8 fL    MCH 24.8 (L) 27.0 - 33.0 pg    MCHC 29.2 (L) 33.7 - 35.3 g/dL    RDW 55.8 (H) 35.9 - 50.0 fL    Platelet Count 494 (H) 164 - 446 K/uL    MPV 10.3 9.0 - 12.9 fL    Neutrophils-Polys 55.80 44.00 - 72.00 %    Lymphocytes 29.40 22.00 - 41.00 %    Monocytes 9.00 0.00 - 13.40 %    Eosinophils 3.50 0.00 - 6.90 %    Basophils 1.00 0.00 - 1.80 %    Immature Granulocytes 1.30 (H) 0.00 - 0.90 %    Nucleated RBC 0.00 /100 WBC    Neutrophils (Absolute) 3.37 1.82 - 7.42 K/uL    Lymphs (Absolute) 1.77 1.00 - 4.80 K/uL    Monos (Absolute) 0.54 0.00 - 0.85 K/uL    Eos (Absolute) 0.21 0.00 - 0.51 K/uL    Baso (Absolute) 0.06 0.00 - 0.12 K/uL    Immature Granulocytes (abs) 0.08 0.00 - 0.11 K/uL    NRBC (Absolute) 0.00 K/uL   Comp Metabolic Panel    Collection Time: 11/10/20 11:29 AM   Result Value Ref Range    Sodium 127 (L) 135 - 145 mmol/L    Potassium 5.2 3.6 - 5.5 mmol/L    Chloride 100 96 - 112 mmol/L    Co2 19 (L) 20 - 33 mmol/L    Anion Gap 8.0 7.0 - 16.0    Glucose 123 (H) 65 - 99 mg/dL    Bun 29 (H) 8 - 22 mg/dL    Creatinine 1.84 (H) 0.50 - 1.40 mg/dL    Calcium 8.4 (L) 8.5 - 10.5 mg/dL    AST(SGOT) 17 12 - 45 U/L    ALT(SGPT) 10 2 - 50 U/L    Alkaline Phosphatase 88 30 - 99 U/L    Total Bilirubin 0.5 0.1 - 1.5 mg/dL    Albumin 2.2 (L) 3.2 - 4.9 g/dL    Total Protein 6.1 6.0 - 8.2 g/dL    Globulin 3.9 (H) 1.9 - 3.5 g/dL    A-G Ratio 0.6 g/dL   MAGNESIUM    Collection Time: 11/10/20 11:29 AM   Result Value Ref Range    Magnesium 1.6 1.5 - 2.5 mg/dL   PHOSPHORUS    Collection Time: 11/10/20 11:29 AM   Result Value Ref Range    Phosphorus 3.5 2.5 - 4.5 mg/dL   ESTIMATED GFR    Collection Time: 11/10/20 11:29 AM   Result Value Ref Range    GFR If  46 (A) >60 mL/min/1.73 m 2    GFR If Non African  American 38 (A) >60 mL/min/1.73 m 2   Histology Request    Collection Time: 11/10/20 11:39 AM   Result Value Ref Range    Pathology Request Sent to Histo        Fluids    Intake/Output Summary (Last 24 hours) at 11/10/2020 1612  Last data filed at 11/10/2020 1400  Gross per 24 hour   Intake 2360.96 ml   Output 2815 ml   Net -454.04 ml       Core Measures & Quality Metrics  Labs reviewed, Medications reviewed and Radiology images reviewed  Zabala catheter: Critically Ill - Requiring Accurate Measurement of Urinary Output  Central line in place: Need for access    DVT Prophylaxis: Contraindicated - High bleeding risk  DVT prophylaxis - mechanical: SCDs  Ulcer prophylaxis: Yes  Antibiotics: Treating active infection/contamination beyond 24 hours perioperative coverage      LEONOR Score  ETOH Screening    Assessment/Plan  Sepsis (HCC)  Assessment & Plan  Septic shock [post op   Vasopressors and fluids     Abscess  Assessment & Plan  Perforated colon at splenic flexure   11/10 segmental colectomy  Diversion colostomy and drainage          Discussed patient condition with RN, RT, Pharmacy and Patient.  CRITICAL CARE TIME EXCLUDING PROCEDURES: 55    Minutes  I independently reviewed pertinent clinical lab tests from the last 48 hours and ordered additional follow up clinical lab tests.  I independently reviewed pertinent radiographic images and the radiologist's reports from the last 48 hours and ordered additional follow up radiographic studies.  I reviewed the details of the available patient records and documentation by consulting physicians in [facility] up to today, summated the information, and utilized the information as warranted in today's medical decision making for this patient.  I personally evaluated the patient condition at bedside and discussed the daily plan(s) with available nursing staff, dieticians, social workers, pharmacists on rounds.  I am actively managing this patient based on my personal bedside evaluation  of this patient's radiographic, and laboratory findings and clinical changes throughout the day.The patient is critically ill with septic shock and multisystem organ failure.  The patient was seen and examined on rounds and discussed with the multidisciplinary critical care team and consulting physicians. Critically evaluated laboratory tests, culture data, medications, imaging, and other diagnostic tests.    The patient has impairment of one or more vital organ systems and a high probability of imminent or life-threatening deterioration in condition. Provided high complexity decision making to assess, manipulate, and support vital system functions to treat vital organ system failure and/or to prevent further life-threatening deterioration of the patient's condition. Requires continued ICU and hospital admission.    Critical care interventions include: integration of multiple data points and associated complex medical decision making, titration of vasopressors, evaluation and direction of antimicrobial therapy for life threatening infection, sepsis resuscitation, management of acute liver failure and management of critical electrolyte abnormalities.

## 2020-11-11 NOTE — ASSESSMENT & PLAN NOTE
11/10 Cortisol 17 in stress state  Hydrocortisone   11/13 Weaned to 50 BID  11/15 Steroids stopped

## 2020-11-11 NOTE — DIETARY
Nutrition services: Day 8 of admit. 54 yo male admitted with intraabdominal fluid collection and a-fib with RVR.  Follow up for adequacy of nutritional intake.    Evaluation:  1. Surgery 11/10 for colectomy, colostomy.  2. Pt was on an Easy to Chew thin liquid diet prior to surgery taking % of most meals. Currently NPO x 1 day.  3. On admit stand up scale weight was 169 kg with current weight 159 kg indicating a 10 kg weight loss over 7 days which is severe.   4. BMI currently 47.54. Nutrition focused physical exam does not reveal any nutrition related malnutrition. Pt is morbidly obese.     Malnutrition risk: does not meet criteria at this time.     Recommendations/Plan:  1. Resume diet as appropriate   2. When diet resumes encourage intake of meals. Consider supplements if intake is less than 50% of most meals.  3. document intake of all meals and supplements as % taken in ADL's to provide interdisciplinary communication across all shifts   4. monitor daily weights  5. Nutrition rep will continue to see patient for ongoing meal and snack preferences.

## 2020-11-11 NOTE — PROGRESS NOTES
Call to Dr. Desouza for updates.  MD to bedside.  Orders placed for afib sustaining in the 160s.  Maxed on levo, new orders placed.

## 2020-11-11 NOTE — PROGRESS NOTES
Infectious Disease Progress Note    Author: Jane Anderson M.D. Date & Time of service: 2020  7:49 AM    Chief Complaint:  Intrabdominal abscess and bacteremia    Interval History:   55 y.o. diabetic male admitted 11/3/2020.+ A. fib, CHD, and severe morbid obesity.  Seen by ID in the past for right foot abscess with cultures positive for MRSA and Proteus.  s/p GLF approximately 1 month ago and was admitted to [facility] with left upper quadrant pain.  CT at the time revealed a subcapsular splenic hematoma. Presented to ED complaining of abdominal pain described as 4/5 out of 10 dull and constant located in the left upper quadrant.     AF WBC 9.4 Less pain in side but remains tender to touch. Denies SE Zyvox-counseled about sxs serotonin syndrome   T-max 99.7 WBC 15.4 patient underwent colectomy, colostomy placement and I&D of flank wound with wound VAC placement on 11/10/2020 by Dr. Desouza.  Postop course complicated by hypotension and transferred to the ICU.  On pressors.  Patient complaining of shortness of breath as well as lightheadedness.  Also has postop abdominal pain.      Review of Systems:  Review of Systems   Constitutional: Positive for malaise/fatigue. Negative for chills and fever.   Respiratory: Positive for shortness of breath. Negative for cough and sputum production.    Gastrointestinal: Positive for abdominal pain. Negative for constipation, diarrhea, nausea and vomiting.   Musculoskeletal: Negative for myalgias.   Neurological: Negative for tremors and sensory change.       Hemodynamics:  Temp (24hrs), Av °C (98.6 °F), Min:36.1 °C (96.9 °F), Max:37.6 °C (99.7 °F)  Temperature: 37.2 °C (99 °F)  Pulse  Av.9  Min: 63  Max: 161   Blood Pressure: 119/73, Arterial BP: 100/86  CVP (mm Hg): (!) 12 MM HG    Physical Exam:  Physical Exam  Vitals signs and nursing note reviewed.   Constitutional:       General: He is not in acute distress.     Appearance: He is obese. He  is ill-appearing. He is not diaphoretic.   HENT:      Nose: No rhinorrhea.      Mouth/Throat:      Pharynx: No oropharyngeal exudate.   Eyes:      General: No scleral icterus.     Extraocular Movements: Extraocular movements intact.      Pupils: Pupils are equal, round, and reactive to light.   Neck:      Comments: Right upper chest tunneled catheter  Cardiovascular:      Rate and Rhythm: Tachycardia present. Rhythm irregular.   Pulmonary:      Effort: No respiratory distress.      Comments: Decreased BS bilaterally, slightly tachypneic with conversation  Abdominal:      General: There is no distension.      Palpations: Abdomen is soft.      Tenderness: There is abdominal tenderness. There is no guarding.      Comments: Midline abdominal surgical site with wound VAC in place, colostomy bag.  Left lower quadrant drain with bloody drainage involved   Musculoskeletal:      Right lower leg: Edema present.      Left lower leg: Edema present.      Comments: Evidence of prior toe amputations.  Sites clean   Skin:     General: Skin is warm.      Coloration: Skin is not jaundiced.      Findings: Bruising and erythema present.   Neurological:      General: No focal deficit present.      Mental Status: He is alert and oriented to person, place, and time.   Psychiatric:         Mood and Affect: Mood normal.         Behavior: Behavior normal.         Meds:    Current Facility-Administered Medications:   •  norepinephrine (Levophed) infusion  •  cefepime (MAXIPIME) IVPB  •  albuterol  •  metroNIDAZOLE (FLAGYL) IV  •  amiodarone infusion  •  fentaNYL  •  LR  •  hydrocortisone sodium succinate PF  •  vasopressin (PITRESSIN) infusion  •  NS  •  ondansetron  •  influenza vaccine quad  •  metoprolol SR  •  psyllium  •  linezolid  •  ondansetron  •  fluconazole  •  [Held by provider] lisinopril  •  OLANZapine  •  sertraline  •  [Held by provider] spironolactone  •  insulin regular **AND** POC Blood Glucose **AND** NOTIFY MD and PharmD  **AND** glucose **AND** dextrose 50%    Labs:  Recent Labs     11/10/20  1129 11/10/20  1750 11/11/20  0500   WBC 6.0 14.5* 15.4*   RBC 5.01 5.00 4.92   HEMOGLOBIN 12.4* 12.7* 12.5*   HEMATOCRIT 42.5 43.1 42.2   MCV 84.8 86.2 85.8   MCH 24.8* 25.4* 25.4*   RDW 55.8* 57.3* 57.1*   PLATELETCT 494* 472* 504*   MPV 10.3 10.9 10.1   NEUTSPOLYS 55.80 77.10* 85.20*   LYMPHOCYTES 29.40 10.10* 6.20*   MONOCYTES 9.00 9.90 5.50   EOSINOPHILS 3.50 0.90 0.50   BASOPHILS 1.00 0.60 0.50   RBCMORPHOLO  --   --  Present     Recent Labs     11/10/20  1129 11/10/20  1750 11/11/20  0500   SODIUM 127* 133* 129*   POTASSIUM 5.2 5.8* 6.3*   CHLORIDE 100 100 102   CO2 19* 16* 15*   GLUCOSE 123* 121* 159*   BUN 29* 29* 29*     Recent Labs     11/10/20  1129 11/10/20  1750 11/11/20  0500   ALBUMIN 2.2*  --  2.2*   TBILIRUBIN 0.5  --  0.5   ALKPHOSPHAT 88  --  95   TOTPROTEIN 6.1  --  6.3   ALTSGPT 10  --  13   ASTSGOT 17  --  25   CREATININE 1.84* 2.09* 2.19*       Imaging:  Ct-abdomen-pelvis With    Addendum Date: 11/5/2020    Addendum: A prior noncontrast CT of the abdomen and pelvis from [facility] dated 10/19/2020 was made available for comparison. On the prior study a perisplenic/subcapsular fluid collection was seen however did not contain air at that time and there was no discrete evidence of communication with the transverse colon. Communication with splenic flexure of the colon and air within the collection are new from the prior study.    Result Date: 11/5/2020  11/3/2020 4:02 PM HISTORY/REASON FOR EXAM:  Abd pain, unspecified; IV contrast only. TECHNIQUE/EXAM DESCRIPTION:   CT scan of the abdomen and pelvis with contrast. Contrast-enhanced helical scanning was obtained from the diaphragmatic domes through the pubic symphysis following the bolus administration of nonionic contrast without complication. 100 mL of Omnipaque 350 nonionic contrast was administered without complication. Low dose optimization  technique was utilized for this CT exam including automated exposure control and adjustment of the mA and/or kV according to patient size. COMPARISON: 9/26/2018 FINDINGS: Chest Base: Small left pleural effusion with mild bibasilar atelectasis. Heart is mildly enlarged. Liver:  Diffuse hypoattenuation of the liver suggesting fatty infiltration. Moderately enlarged. Gallbladder: Cholelithiasis. Biliary tract: Nondilated. Pancreas: Normal. Spleen: There is a rim-enhancing perisplenic fluid collection which contains air and which communicates with the splenic flexure of the colon. This most likely represents an abscess and measures about 8.7 x 7.9 cm. The anterior aspect of the spleen is nonenhancing with irregular margins could be related to infection of splenic parenchyma or infarction. Adrenals: Normal. Kidneys and Collecting Systems:  There is mild right renal cortical scarring. No hydronephrosis. No renal mass. Gastrointestinal tract:  No bowel obstruction. The appendix is normal. Peritoneum: No free air or free fluid. Reproductive organs:  Normal. Bladder:  Normal. Vessels:  Normal caliber. Lymph  Nodes:  No lymphadenopathy. Abdominal wall: Within normal limits. Bones:  Age indeterminate possibly subacute to chronic T11 and T12 superior endplate compression fractures.     1.  Peripherally enhancing perisplenic fluid collection which communicates with the splenic flexure of the colon and which contains foci of air is most consistent with an abscess/infected fluid collection. There is nonenhancement of the anterior aspect of the spleen suggesting infection/infarction. Surgical consultation is recommended. 2.  Hepatomegaly and hepatic steatosis. 3.  Cholelithiasis. 4.  Right renal cortical scarring. No hydronephrosis. 5.  Small left pleural effusion. Mild bibasilar atelectasis. 6.  Subacute to chronic appearing T11 and T12 superior endplate compression fractures. These findings were discussed with SVETLANA PAREDES  on 11/3/2020 4:49 PM.     Ct-drain-peritoneal    Result Date: 11/4/2020 11/4/2020 5:19 PM HISTORY/REASON FOR EXAM:  Joya Splenic Fluid Collection TECHNIQUE/EXAM DESCRIPTION: LUQ parasplenic abscess drainage with CT guidance. Low dose optimization technique was utilized for this CT exam including automated exposure control and adjustment of the mA and/or kV according to patient size. PROCEDURE: Informed consent was obtained. SEDATION: Moderate sedation was provided. Pulse oximetry and continuous cardiac monitoring by the nurse was performed throughout the exam. Intraservice time was 30 minutes. Localizing CT images were obtained with the patient in supine position. The skin was prepped with Betadine and draped in a sterile fashion. Following local anesthesia with 1% Lidocaine, a 17 G guiding needle was placed and needle path confirmed with CT. An Amplatz guidewire was placed and following serial tract dilatation, a 10 Serbian pigtail locking catheter was placed. A specimen was collected and submitted for culture and sensitivity and Gram stain. Total of 6 mL of Brownish fluid was drained. The catheter was secured to the skin and connected to suction bulb drainage. Final CT images were obtained documenting catheter position. The patient tolerated the procedure well with no evidence of complication. COMPARISON: CT scan abdomen and pelvis 11/3/2020 FINDINGS: The final CT images show satisfactory catheter position within the target collection.     1.  CT guided perisplenic abscess catheter drainage. 2.  The current plan is to obtain a follow-up CT in 5-7 days..    Dx-chest-portable (1 View)    Result Date: 11/3/2020  11/3/2020 3:41 PM HISTORY/REASON FOR EXAM:  Chest Pain. TECHNIQUE/EXAM DESCRIPTION AND NUMBER OF VIEWS: Single portable view of the chest. COMPARISON: September 10, 2020 FINDINGS: The cardiac size is enlarged. There is interstitial prominence. Lung aeration has improved. No pneumothorax. No obvious pleural  "effusion.     Slight interval interval improvement in congestive heart failure.      Micro:  Results     Procedure Component Value Units Date/Time    BLOOD CULTURE [[id number]] Collected: 11/05/20 1950    Order Status: Completed Specimen: Blood from Peripheral Updated: 11/10/20 2100     Significant Indicator NEG     Source BLD     Site PERIPHERAL     Culture Result No growth after 5 days of incubation.    Narrative:      Per Hospital Policy: Only change Specimen Src: to \"Line\" if  specified by physician order.  Right AC    BLOOD CULTURE [[id number]] Collected: 11/05/20 1950    Order Status: Completed Specimen: Blood from Peripheral Updated: 11/10/20 2100     Significant Indicator NEG     Source BLD     Site PERIPHERAL     Culture Result No growth after 5 days of incubation.    Narrative:      Per Hospital Policy: Only change Specimen Src: to \"Line\" if  specified by physician order.  Right Hand    GRAM STAIN [[id number]] Collected: 11/10/20 1039    Order Status: Completed Specimen: Wound Updated: 11/10/20 1739     Significant Indicator .     Source WND     Site COLON PERFORATION     Gram Stain Result Rare WBCs.  Rare Gram positive cocci.  Rare Gram positive rods.      Anaerobic Culture [[id number]] Collected: 11/10/20 1039    Order Status: Completed Specimen: Other Updated: 11/10/20 1329    CULTURE WOUND W/ GRAM STAIN [[id number]] Collected: 11/10/20 1039    Order Status: Completed Specimen: Other Updated: 11/10/20 1329    SARS-CoV-2, PCR (In-House) [[id number]] Collected: 11/09/20 1855    Order Status: Completed Updated: 11/09/20 2252     SARS-CoV-2 Source NP Swab     SARS-CoV-2 by PCR NotDetected     Comment: [facility] providers: PLEASE REFER TO DE-ESCALATION AND RETESTING PROTOCOL  on [email or web address]  **The TaqPath COVID-19 SARS-CoV-2 test has been made available for use under  the Emergency Use Authorization (EUA) only.         Narrative:      Collected By:[id number] ZULY MOURA  Is patient being " "admitted?->Yes  Does this patient meet criteria for Rush/Cepheid per [facility]  Inpatient Workflow? (See workflow link below)->Yes  Expected turn around time?->Rush (Cepheid 2-4 hours)  Is this the patients First SARS CoV-2 test?->No  Is this patient employed in healthcare?->No  Is the patient symptomatic as defined by the CDC?->No  Is the patient hospitalized?->Yes  Is the patient in the ICU?->No  Is the patient a resident in a congregate care setting?->No  Is the patient pregnant?->No    COVID/SARS CoV-2 PCR [[id number]] Collected: 11/09/20 1855    Order Status: Completed Specimen: Respirate from Nasopharyngeal Updated: 11/09/20 1907     COVID Order Status Received     Comment: The order for SARS CoV-2 testing has been received by the  Laboratory. This result is neither positive nor negative.  Final results of testing will report in 24-48 hours, separately.         Narrative:      Collected By:[id number] ZULY MOURA  Is patient being admitted?->Yes  Does this patient meet criteria for Rush/Cepheid per [facility]  Inpatient Workflow? (See workflow link below)->Yes  Expected turn around time?->Rush (Cepheid 2-4 hours)  Is this the patients First SARS CoV-2 test?->No  Is this patient employed in healthcare?->No  Is the patient symptomatic as defined by the CDC?->No  Is the patient hospitalized?->Yes  Is the patient in the ICU?->No  Is the patient a resident in a congregate care setting?->No  Is the patient pregnant?->No    BLOOD CULTURE [[id number]] Collected: 11/03/20 1626    Order Status: Completed Specimen: Blood from Peripheral Updated: 11/08/20 1900     Significant Indicator NEG     Source BLD     Site PERIPHERAL     Culture Result No growth after 5 days of incubation.    Narrative:      Per Hospital Policy: Only change Specimen Src: to \"Line\" if  specified by physician order.  No site indicated    Aerobic/Anaerobic Culture (Surgery) [[id number]] Collected: 11/04/20 1730    Order Status: Completed Specimen: Wound " from Peritoneal Fluid Updated: 11/08/20 0829     Significant Indicator NEG     Source WND     Site Peritoneal Drain     Culture Result Order placed in Surgery. Source appropriate culture orders  have been placed in Micro for testing.      Narrative:      Special Contact [id number] MAXINE Stevenson fluid from peritoneal drain  Special Contact [id number] MAXINE ELLIS    Anaerobic Culture [[id number]] Collected: 11/04/20 1730    Order Status: Completed Specimen: Wound Updated: 11/08/20 0829     Significant Indicator NEG     Source WND     Site Peritoneal Drain     Culture Result No Anaerobes isolated.    Narrative:      Special Contact [id number] MAXINE Stevenson fluid from peritoneal drain  Special Contact [id number] MAXINE ELLIS    CULTURE WOUND W/ GRAM STAIN [[id number]]  (Abnormal)  (Susceptibility) Collected: 11/04/20 1730    Order Status: Completed Specimen: Wound Updated: 11/08/20 0829     Significant Indicator POS     Source WND     Site Peritoneal Drain     Culture Result -     Gram Stain Result Rare WBCs.  Few Gram positive cocci.  Few Yeast.       Culture Result Enterococcus faecalis  Heavy growth        Candida albicans  Moderate growth      Narrative:      Special Contact [id number] MAXINE Stevenson fluid from peritoneal drain  Special Contact [id number] MAXINE ELLIS    Susceptibility     Enterococcus faecalis (1)     Antibiotic Interpretation Microscan Method Status    Daptomycin Sensitive 4 mcg/mL ISAMAR Final    Ampicillin Sensitive <=2 mcg/mL ISAMAR Final    Gent Synergy Sensitive <=500 mcg/mL ISAMAR Final    Vancomycin Sensitive 1 mcg/mL ISAMAR Final    Penicillin Sensitive 2 mcg/mL ISAMAR Final                   CULTURE STOOL [[id number]] Collected: 11/04/20 1609    Order Status: Completed Specimen: Stool Updated: 11/07/20 1608     Significant Indicator NEG     Source STL     Site STOOL     Culture Result Shiga Toxin testing not performed due to lack of growth  "in  [place] broth.  No enteric pathogens, only usual Gram positive enteric  joselin isolated.  NOTE:  Stool cultures are screened for Shiga Toxins 1 and 2,  Salmonella, Shigella, Campylobacter, Aeromonas,  Plesiomonas, and Vibrio.      Narrative:      Special Contact [id number] EMIL PRITCHARD  Does this patient have risk factors for C-diff?->Yes  Special Contact [id number] EMIL PRITCHARD    URINALYSIS [[id number]]  (Abnormal) Collected: 11/06/20 1235    Order Status: Completed Specimen: Urine, Clean Catch Updated: 11/06/20 1327     Color DK Yellow     Character Clear     Specific Gravity 1.019     Ph 5.0     Glucose Negative mg/dL      Ketones Negative mg/dL      Protein Negative mg/dL      Bilirubin Negative     Urobilinogen, Urine 0.2     Nitrite Negative     Leukocyte Esterase Trace     Occult Blood Negative     Micro Urine Req Microscopic    Narrative:      Collected By:[id number] FJ MURDOCK    BLOOD CULTURE [[id number]]  (Abnormal) Collected: 11/03/20 1528    Order Status: Completed Specimen: Blood from Peripheral Updated: 11/06/20 1141     Significant Indicator POS     Source BLD     Site PERIPHERAL     Culture Result Growth detected by Bactec instrument.  11/04/2020  12:52      Viridans Streptococcus  Possible Contaminant  Isolated from one bottle only, correlate with clinical  condition.      Narrative:      CALL  [place]  183 tel. [phone],  CALLED  183 tel. [phone] 11/04/2020, 12:59, RB PERF. RESULTS CALLED TO:  [id number], RN  Per Hospital Policy: Only change Specimen Src: to \"Line\" if  specified by physician order.  No site indicated    URINE CULTURE(NEW) [[id number]] Collected: 11/03/20 1707    Order Status: Completed Specimen: Urine Updated: 11/06/20 0749     Significant Indicator NEG     Source UR     Site -     Culture Result Mixed skin joselin <10,000 cfu/mL    Narrative:      Indication for culture:->Emergency Room Patient  Indication for culture:->Emergency Room Patient    GRAM STAIN " [[id number]] Collected: 11/04/20 1730    Order Status: Completed Specimen: Wound Updated: 11/05/20 0137     Significant Indicator .     Source WND     Site Peritoneal Drain     Gram Stain Result Rare WBCs.  Few Gram positive cocci.  Few Yeast.      Narrative:      Special Contact [id number] MAXINE ELLIS  Brown fluid from peritoneal drain  Special Contact [id number] MXAINE ELLIS    C Diff by PCR rflx Toxin [[id number]] Collected: 11/04/20 1609    Order Status: Completed Specimen: Stool Updated: 11/04/20 2202     C Diff by PCR Negative     Comment: C. difficile NOT detected by PCR.  Treatment not indicated per guidelines.  Repeat testing not indicated within 7 days.          027-NAP1-BI Presumptive Negative     Comment: Presumptive 027/NAP1/BI target DNA sequences are NOT DETECTED.       Narrative:      Special Contact [id number] EMIL PRITCHARD  Does this patient have risk factors for C-diff?->Yes    FLUID CULTURE W/GRAM STAIN [[id number]] Collected: 11/04/20 1730    Order Status: Canceled Specimen: Other from Peritoneal Fluid            ASSESSMENT/PLAN:     Hospital Course:   CT scan on 11/3 revealed a peripherally enhancing perisplenic fluid collection measuring 8.7 x 7.9 cm, communicating with the splenic flexure of the colon and contained foci of air consistent with abscess/infected fluid.  Also noted nonenhancing of the anterior aspect of the spleen suggesting infection/infarction.    Status post surgery on 11/10/2020    Postop hypotension, new  Leukocytosis  Now on pressors  On antibiotics below    Acute kidney injury, worse  Renally dose abx accordingly  Avoid nephrotoxins  Monitor    Bacteremia  -Blood cultures on 11/31/2 + viridans streptococci, possible contaminant  -Blood culture on 11/5 with no growth to date    Perisplenic abscess with possible communication to the bowel  Left upper quadrant abscess  Afebrile  No current leukocytosis  Splenic enhancement on CT, suggesting infection  or infarction  -Drainage on 11/4 and peritoneal fluid Enterococcus faecalis, ampicillin sensitive and  Calbicans  Recent ground-level fall located by a subcapsular splenic hematoma  Continue zyvox and fluconazole  Monitor CBC while on linezolid  Cefepime and Flagyl added by primary team on 11/10  Status post segmental colectomy, colostomy, irrigation and debridement of flank wound and wound VAC placement on 11/10/2020 by Dr. Desouza.  Patient found to have a left upper quadrant abscess inferior to the spleen with a large bowel wall defect.  Also noted to have an abdominal wall abscess.  Operative cultures (colon perforation) - pending.  Gram stain+ GPC, GPR  Follow drain output and repeat CT a/p in 5-7 days    Type II diabetes  Keep BS under 150 to help control current infection    A. Fib  Contributing to hypotension  Cards following     Diarrhea  -C. difficile tested negative on 11/4    Antibiotic allergies: Daptomycin rash, penicillins rash and hives, tolerates cephalosporins-discussed allergies and he states that he has developed significant rash and hives with penicillins several times, most recently a year and a half ago- will avoid penicillins     Plan of care discussed with bedside RN.

## 2020-11-11 NOTE — PROGRESS NOTES
[facility] FAMILY MEDICINE PROGRESS NOTE     Attending: Sara Duran M.D.  Senior Resident: Mariposa Collins M.D. (PGY-3)  Tommy Resident: Jason Peace  (PGY-1)  PATIENT: Sebastián Castro; [id number]; 1965    ID: 55 y.o. male recently discharged 10/31 from [facility] for splenic hematoma, admitted to [facility] for perisplenic abscess, a.fib with RVR.    Overnight Events: Returned from surgery, in ICU. Decreased urine output overnight, given 500 cc bolus x 2, maxed out on levo, now added vassopressin overnight     Subjective: Having abdominal pain,no flatus,otherwise doing okay    OBJECTIVE:  /57   Pulse (!) 120   Temp 37.2 °C (99 °F) (Temporal)   Resp 14   Ht 1.829 m (6')   Wt (!) 159 kg (350 lb 8.5 oz)   SpO2 92%   BMI 47.54 kg/m²       Intake/Output Summary (Last 24 hours) at 11/11/2020 1307  Last data filed at 11/11/2020 1135  Gross per 24 hour   Intake 6580.48 ml   Output 705 ml   Net 5875.48 ml          PHYSICAL EXAM:  General: No acute distress, afebrile, resting comfortably  HEENT: NC/AT. EOMI. MMM, neck supple without adenopathy  Cardiovascular: +Irregularly irregular rhythm, tachycardic, normal S1/S2 no murmurs rubs, or gallops, cap refill brisk.  Respiratory: CTAB, no tachypnea or retractions  Abdomen: soft,obese,+ midline open incision with wound vac, + severe TTP, drain in place with serosanguinous fluid  EXT:  +bruising on upper extremities. 1+ LE edema. No rashes or skin changes noted. Pulses 2+ DP and radial bilaterally  Neuro: Non-focal       LABS:  Recent Labs     11/10/20  1129 11/10/20  1750 11/11/20  0500   WBC 6.0 14.5* 15.4*   RBC 5.01 5.00 4.92   HEMOGLOBIN 12.4* 12.7* 12.5*   HEMATOCRIT 42.5 43.1 42.2   MCV 84.8 86.2 85.8   MCH 24.8* 25.4* 25.4*   RDW 55.8* 57.3* 57.1*   PLATELETCT 494* 472* 504*   MPV 10.3 10.9 10.1   NEUTSPOLYS 55.80 77.10* 85.20*   LYMPHOCYTES 29.40 10.10* 6.20*   MONOCYTES 9.00 9.90 5.50   EOSINOPHILS 3.50 0.90 0.50   BASOPHILS 1.00 0.60 0.50   RBCMORPHOLO  --   --  Present       Recent Labs     11/10/20  1129 11/10/20  1129 11/11/20  0500 11/11/20  0900 11/11/20  1210   SODIUM 127*   < > 129* 134* 133*   POTASSIUM 5.2   < > 6.3* 6.3* 6.1*   CHLORIDE 100   < > 102 104 105   CO2 19*   < > 15* 16* 16*   BUN 29*   < > 29* 30* 30*   CREATININE 1.84*   < > 2.19* 2.22* 2.32*   CALCIUM 8.4*   < > 8.5 8.3* 8.3*   MAGNESIUM 1.6  --   --   --   --    PHOSPHORUS 3.5  --   --   --   --    ALBUMIN 2.2*  --  2.2*  --   --     < > = values in this interval not displayed.     Estimated GFR/CRCL = Estimated Creatinine Clearance: 54.8 mL/min (A) (by C-G formula based on SCr of 2.32 mg/dL (H)).  Recent Labs     11/11/20  0026 11/11/20  0500 11/11/20  0900 11/11/20  0957 11/11/20  1210   GLUCOSE  --  159* 166*  --  163*   POCGLUCOSE 138* 166*  --  166*  --      Recent Labs     11/10/20  1129 11/11/20  0500   ASTSGOT 17 25   ALTSGPT 10 13   TBILIRUBIN 0.5 0.5   ALKPHOSPHAT 88 95   GLOBULIN 3.9* 4.1*             No results for input(s): INR, APTT, FIBRINOGEN in the last 72 hours.    Invalid input(s): DIMER    MICROBIOLOGY:   Blood Culture   Date Value Ref Range Status   03/15/2018 No growth after 5 days of incubation.  Final        IMAGING:   EC-ECHOCARDIOGRAM COMPLETE W/ CONT   Final Result      DX-CHEST-PORTABLE (1 VIEW)   Final Result      No pneumothorax identified following right subclavian line placement      Worsening atelectasis with consolidation at the bases not excluded      Stable cardiac silhouette enlargement      CT-ABDOMEN-PELVIS WITH   Final Result      Interval decrease in size of perisplenic abscess. Pigtail catheter now in place      Abscess has likely fistulous communication with the abutting splenic flexure of the colon. Colon is otherwise normal in appearance      Mild hepatosplenomegaly      Decreasing small left pleural effusion      Subacute anterior wedge compression fractures in the caudal thoracic spine      CT-DRAIN-PERITONEAL   Final Result      1.  CT guided perisplenic abscess  catheter drainage.   2.  The current plan is to obtain a follow-up CT in 5-7 days..      OUTSIDE IMAGES-CT CHEST   Final Result      OUTSIDE IMAGES-CT ABDOMEN /PELVIS   Final Result      CT-ABDOMEN-PELVIS WITH   Final Result   Addendum 1 of 1   Addendum:   A prior noncontrast CT of the abdomen and pelvis from [facility] dated 10/19/2020 was made available for comparison. On the    prior study a perisplenic/subcapsular fluid collection was seen however    did not contain air at that time and    there was no discrete evidence of communication with the transverse colon.    Communication with splenic flexure of the colon and air within the    collection are new from the prior study.      Final      DX-CHEST-PORTABLE (1 VIEW)   Final Result      Slight interval interval improvement in congestive heart failure.          MEDS:  Current Facility-Administered Medications   Medication Last Admin   • metroNIDAZOLE (FLAGYL) tablet 500 mg     • norepinephrine (Levophed) infusion 8 mg/250 mL (premix) 12.5 mcg/min at 11/11/20 1248   • ceFEPIme (MAXIPIME) 1 g in  mL IVPB Stopped at 11/11/20 0514   • albuterol (PROVENTIL) 2.5mg/0.5ml nebulizer solution 2.5 mg     • amiodarone (NEXTERONE) 360 mg/200 mL infusion 0.5 mg/min at 11/11/20 1240   • fentaNYL (SUBLIMAZE) injection 25-50 mcg 50 mcg at 11/11/20 0839   • lactated ringers infusion 500 mL at 11/11/20 1107   • hydrocortisone sodium succinate PF (SOLU-CORTEF) 100 MG injection 50 mg 50 mg at 11/11/20 0444   • vasopressin (VASOSTRICT) 20 Units in  mL Infusion 0.03 Units/min at 11/11/20 0741   • NS infusion New Bag at 11/11/20 0911   • ondansetron (ZOFRAN) syringe/vial injection 4 mg 4 mg at 11/08/20 1240   • influenza vaccine quad injection 0.5 mL     • metoprolol SR (TOPROL XL) tablet 50 mg 50 mg at 11/11/20 0444   • psyllium (METAMUCIL/KONSYL) 1 Packet 1 Packet at 11/11/20 0445   • linezolid (ZYVOX) tablet 600 mg 600 mg at 11/11/20 0444   •  ondansetron (ZOFRAN ODT) dispertab 4 mg 4 mg at 11/08/20 0515   • fluconazole (DIFLUCAN) tablet 400 mg 400 mg at 11/11/20 0444   • OLANZapine (ZYPREXA) tablet 7.5 mg 7.5 mg at 11/10/20 2141   • sertraline (Zoloft) tablet 125 mg 125 mg at 11/11/20 0444   • insulin regular (HumuLIN R,NovoLIN R) injection 1 Units at 11/11/20 0502    And   • glucose 4 g chewable tablet 16 g      And   • dextrose 50% (D50W) injection 50 mL         PROBLEM LIST:  No problems updated.    ASSESSMENT/PLAN: 55 y.o. male admitted for perisplenic abscess and bacteremia, now POD#1 from colectomy with ostomy 11/10. Followed by ID and general surgery.     # Perisplenic abscess s/p BETTE drain, growing Enterococcus faecalis and candida albicans  # Gram Positive cocci bacteremia with strep, resolved   # S/p colectomy with ostomy in place 11/10/2020  # Septic shock   # Colon perforation gram stain with rare WBCs, rare gram positive cocci, rare gram positive rods   Central line placed 10/10  CT: AP on 11/8 shows interval decrease in size of perisplenic abscess; likely has fistulous communication with abutting splenic flexure of colon.   Wound culture enterococcus faecalis  - Admitted to SICU following surgery  - Pressor support with levophed and vasopressin for pressure support   - ID following, appreciate recs             - DC ceftriaxone 11/9 as does not treat enterococcus and no GNR found             - cont linezolid, cefepine q8 hrs, flagyl, and fluconazole  - Appreciate SICU intensivist recommendations  - Appreciate infectious diseases recommendations     # Hyperkalemia  # Worsening renal function  - SICU Doctor states consider consulting nephro, will discuss with nurse and see if nephrology was consulted   - CTM     #Atrial fibrillation with RVR  No anticoagulation. CHADSVASC of 3. Does not appear he has been following up with cardiology regularly, though he has seen a cardiologist.   Cardiology consulted 11/8 and explained that increased HR is  physiological compensation to maintain adequate cardiac output in the setting of infection with CHF. Patient has reduced EF, and tachycardia is natural response to infection. For now, he's hemodynamically stable.  Seen by cardiac electrophysiologist in 2019 and deemed not to be good candidate for anticoagulation due to frequent falls 2/2 orthostatic hypotension.  Currently in Afib RVR  - Started on amiodarone drip  - Hold metoprolol    # Leukocytosis   White count of 15.4   Likely 2/2 recent colectomy and possible worsening sepsis  - Continue abx as above     #Troponinemia  #History of prolonged QTc interval  #CHFrEF  #New onset hypotension  Troponin elevated to his baseline, patient reports compliance with home metoprolol. Last echocardiogram on file from September 2020 with LVEF of 30%.  EKG showed QT of 496.  Patient persistently hypotensive, to as low as 80s systolic. Asymptomatic. Hx of orthostatic hypotension.   - Use medications that prolong QT with caution (Zofran, Abx)  - Holding Lisinopril and spironolactone  - Reduced lasix to 20 mg daily with hold parameters  - will need outpt CÉSAR     #Hyponatremia, resolved   In SICU   - Replete PRN by pharmacy   - CTM     #Elevated lipase - resolved  No evidence of pancreatitis on CT imaging.       # Microcytic anemia, mild  Likely iron deficient anemia versus anemia of chronic disease.  Stable.     # Diabetes mellitus type 2  Patient states blood sugar well controlled at home with metformin. Last a1c of 7.8 on 11/10/20   POC glucose 100s - 140s.  - Sliding scale insulin with hypoglycemia protocol  - Ensure strict glucose control for proper infection control, <180     # UTI with dysuria  UA positive leukocyte esterase, negative nitrites on presentation    - On abx      Lines: PIV, central line on pressor support   Tubes: Ostomy bag   Abx:  Fluconazole, linezolid, flagyl, cefepime   DVT prophylaxis: SCDs, will reach out to Dr Desouza about anticoagulation   Code Status:  Full     Disposition: SICU given septic shock s/p colectomy for perforated splenic abscess with ostomy, requiring pressor support. Will likely be transferred to hospitalist tomorrow due to restructuring of hospital given COVID pandemic. [facility] will no longer be following patient on 11/12/20 given restructuring, hospitalist covering for SICU to take over.     Mariposa Collins M.D.   PGY-3  [facility]   [phone]

## 2020-11-11 NOTE — WOUND TEAM
[facility]  Inpatient Services  Initial Wound and Skin Care Evaluation    Admission Date: 11/3/2020     Last order of IP CONSULT TO WOUND CARE was found on 11/10/2020 from Hospital Encounter on 11/3/2020     HPI, PMH, SH: Reviewed    Unit where seen by Wound Team: S108/[id number]     WOUND CONSULT/FOLLOW UP RELATED TO:  Left flank and ostomy       Self Report / Pain Level:  Pain with wound assessment but tolerated well.        OBJECTIVE:  In bed, tech waiting to complete echo.  Patient obese.  Surgery yesterday with Dr. Desouza, Large bowel perforation at the splenic flexure, abdominal wall abscess        WOUND TYPE, LOCATION, CHARACTERISTICS (Pressure Injuries: location, stage, POA or date identified)        Wound 11/10/20 Skin Tear Pannus (Active)   Wound Image    11/10/20 1400   Site Assessment Red    Periwound Assessment Pink;Red;Rash    Periwound Protectant Moisture Barrier        Wound 11/10/20 Full Thickness Wound Flank Left (Active)   Wound Image   11/11/20 1130   Site Assessment Red    Periwound Assessment Clean;Dry;Intact    Margins Attached edges;Defined edges    Closure Adhesive bandage    Drainage Amount Small    Drainage Description Serosanguineous    Treatments Cleansed;Site care    Wound Cleansing Approved Wound Cleanser    Periwound Protectant Skin Protectant Wipes to Periwound    Dressing Cleansing/Solutions Not Applicable    Dressing Options Silver Strip Packing;Silicone Adhesive Foam    Dressing Changed New    Dressing Status Clean;Dry;Intact    Dressing Change/Treatment Frequency Daily, and As Needed    NEXT Dressing Change/Treatment Date 11/12/20    NEXT Weekly Photo (Inpatient Only) 11/18/20    Non-staged Wound Description Full thickness    Wound Length (cm) 0.5 cm    Wound Width (cm) 1 cm    Wound Depth (cm) 1.5 cm    Wound Surface Area (cm^2) 0.5 cm^2    Wound Volume (cm^3) 0.75 cm^3    Tunneling (cm) 3.6 cm    Tunneling Clock Position of Wound 9    Shape Oval    Wound Odor None     "  Exposed Structures VON    WOUND NURSE ONLY - Time Spent with Patient (mins) 30      Vascular:    SHAYAN:   No results found.    Lab Values:    Lab Results   Component Value Date/Time    WBC 15.4 (H) 11/11/2020 05:00 AM    RBC 4.92 11/11/2020 05:00 AM    HEMOGLOBIN 12.5 (L) 11/11/2020 05:00 AM    HEMATOCRIT 42.2 11/11/2020 05:00 AM    CREACTPROT 0.54 10/23/2018 08:02 AM    SEDRATEWES 13 10/23/2018 08:02 AM    HBA1C 7.8 (H) 11/10/2020 03:31 AM        Culture Results show:  Recent Results (from the past 720 hour(s))   CULTURE WOUND W/ GRAM STAIN    Collection Time: 11/04/20  5:30 PM    Specimen: Wound   Result Value Ref Range    Significant Indicator POS (POS)     Source WND     Site Peritoneal Drain     Culture Result - (A)     Gram Stain Result Rare WBCs.  Few Gram positive cocci.  Few Yeast.       Culture Result Enterococcus faecalis  Heavy growth   (A)     Culture Result Candida albicans  Moderate growth   (A)        Susceptibility    Enterococcus faecalis - ISAMAR     Daptomycin 4 Sensitive mcg/mL     Ampicillin <=2 Sensitive mcg/mL     Gent Synergy <=500 Sensitive mcg/mL     Vancomycin 1 Sensitive mcg/mL     Penicillin 2 Sensitive mcg/mL       INTERVENTIONS BY WOUND TEAM:  Chart and images reviewed. Discussed with bedside RN. This RN in to assess patient. VAC change tomorrow AM per Lyly.  See ostomy note below.   Left flank dressing removed, cleanse with NS and gauze, new photo and measurements taken.  Gently filled with 1/4\" silver strip packing.  Secured with adhesive foam.      Interdisciplinary consultation: Patient, Bedside RN (Arlin), Dr. Desouza.     EVALUATION / RATIONALE FOR TREATMENT: patient went to OR yesterday with Dr. Desouza after Large bowel perforation at the splenic flexure, abdominal wall abscess.  Left flank wound appears clean.  Continue with daily packing.    Unable to see patient buttocks due to tech waiting to preform echo on patient.  Orders updated.  Does not appear pressure related.  "     Meet with Dr. Desouza tomorrow AM.  Possibly assess buttocks at that time.      Goals: Steady decrease in wound area and depth weekly.    NURSING PLAN OF CARE ORDERS (X):    Dressing changes: See Dressing Care orders: X  Skin care: See Skin Care orders: X  RN Prevention Protocol: X  Rectal tube care: See Rectal Tube Care orders:   Other orders:    RSKIN:   CURRENTLY IN PLACE (X), APPLIED THIS VISIT (A), ORDERED (O):   Q shift Michael:  X  Q shift pressure point assessments:  X  Pressure redistribution mattress            Low Airloss        ICU bed   Bariatric DARYL         Bariatric foam           Heel float boots     Heel Silicone dressing      X  Float Heels off Bed with Pillows             X  Barrier wipes         Barrier Cream         Barrier paste        X  Sacral silicone dressing         Silicone O2 tubing         Anchorfast         Cannula fixation Device (Tender )          Gray Foam Ear protectors     High flow offloading Clip    Elastic head band offloading device                                                      Trach with Optifoam split foam       Z Teja Pillow                             Waffle cushion        Waffle Overlay         Rectal tube or BMS    Purwick/Condom Cath          Antifungal tx      Interdry          Reposition q 2 hours    X  TAPs Turning system                 Up to chair        Ambulate      PT/OT        Dietician        Diabetes Education      PO     TF   X  TPN     NPO   # days   Other        WOUND TEAM PLAN OF CARE:   Dressing changes by wound team:                   Follow up 3 times weekly:                NPWT change 3 times weekly:   ABD  Follow up 1-2 times weekly:    Left flank   Follow up Bi-Monthly:                   Follow up as needed:       Other (explain):     Anticipated discharge plans: will need ongoing wound care post DC  LTACH:        SNF/Rehab:                  Home Health Care:           Outpatient Wound Center:            Self Care:               [facility]  Inpatient Services  New Ostomy Management & Teaching    HPI:  Reviewed  PMH: Reviewed   SH: Reviewed    Subjective:     Objective:    Colostomy 11/10/20 Transverse LUQ (Active)   Wound Image    11/11/20 1135   Stomal Appliance Assessment Leaking;Changed    Stoma Assessment Red;Edema    Stoma Shape Budded Less Than One Inch;Flush    Stoma Size (in) 1.75    Peristomal Assessment Intact    Mucocutaneous Junction Intact    Treatment Appliance Changed;Cleansed with water/washcloth    Peristomal Protectant Paste Ring    Stomal Appliance Paste Ring, 2\";2 1/4\" (57mm) CTF    Output (mL) 10 mL    Output Color Bloody    WOUND RN ONLY - Stomal Appliance  2 Piece;Paste Ring, 2\";2 1/4\" (57mm) CTF    Appliance Brand Paron    Secure Start completed Not Medically Stable    WOUND NURSE ONLY - Time Spent with Patient (mins) 30                     Ostomy Appliance (type and size):  2 piece 2 1/4 with paste ring      Interventions:  Previous appliance removed, cleansed skin with warm wash cloth.  Photo taken.  Barrier cut, due to stoma being nearly flush, paste ring applied to barrier and applied to skin.  Attached and closed pouch.        Pt education: Questions and concerns addressed    Evaluation:  Transverse colostomy after colon perforation post drain placement.  No teaching at this time.       Plan: Ostomy nurses to continue to follow for ostomy needs and teaching     Anticipated discharge needs: Supplies, supplier information, possible HH or outpatient ostomy clinic   "

## 2020-11-11 NOTE — ASSESSMENT & PLAN NOTE
BP low after abscess drainage  11/11 Echocardiogram completed with normal left ventricular chamber size.  Moderately reduced left ventricular systolic function.  Left ventricular ejection fraction is visually estimated to be 30-40%

## 2020-11-11 NOTE — PROGRESS NOTES
Surgery    Remains on vasopressors  Rapid A. fib  Creatinine increasing/oliguria  Potassium elevated  Pain control borderline    /57   Pulse (!) 120   Temp 37.2 °C (99 °F) (Temporal)   Resp 14   Ht 1.829 m (6')   Wt (!) 159 kg (350 lb 8.5 oz)   SpO2 92%   BMI 47.54 kg/m²     Recent Labs     11/10/20  1129 11/10/20  1750 11/11/20  0500   WBC 6.0 14.5* 15.4*   RBC 5.01 5.00 4.92   HEMOGLOBIN 12.4* 12.7* 12.5*   HEMATOCRIT 42.5 43.1 42.2   MCV 84.8 86.2 85.8   MCH 24.8* 25.4* 25.4*   RDW 55.8* 57.3* 57.1*   PLATELETCT 494* 472* 504*   MPV 10.3 10.9 10.1   NEUTSPOLYS 55.80 77.10* 85.20*   LYMPHOCYTES 29.40 10.10* 6.20*   MONOCYTES 9.00 9.90 5.50   EOSINOPHILS 3.50 0.90 0.50   BASOPHILS 1.00 0.60 0.50   RBCMORPHOLO  --   --  Present       Recent Labs     11/10/20  1750 11/11/20  0500 11/11/20  0900   SODIUM 133* 129* 134*   POTASSIUM 5.8* 6.3* 6.3*   CHLORIDE 100 102 104   CO2 16* 15* 16*   GLUCOSE 121* 159* 166*   BUN 29* 29* 30*     Recent Labs     11/10/20  1129 11/11/20  0500   ASTSGOT 17 25   ALTSGPT 10 13   TBILIRUBIN 0.5 0.5   ALKPHOSPHAT 88 95   GLOBULIN 3.9* 4.1*       Assessment and plan:  55-year-old male with perforation of the splenic flexure of uncertain etiology status post segmental colectomy, colostomy and abscess drainage  Still requiring significant resuscitation  Serial labs to trend indices  ?  Nephrology consult  ID had been following patient: Would discuss antibiotic management with them  Cultures pending  Needs PT/OT  Mobilization    Remains critically ill and will defer management to the SICU service

## 2020-11-11 NOTE — ASSESSMENT & PLAN NOTE
Cardiology OK with high rates as long as HD stable  But on vasopressors with rate 160s  Amiodarone started, as pt on levophed  11/12 rate controlled with digoxin

## 2020-11-11 NOTE — ASSESSMENT & PLAN NOTE
Admitted with LUQ abscess and suspicion of perforation at splenic flexure  11/9 Drain with stool and unable to hold suction  11/10 Segmental colectomy, colostomy, Patti pouch, I & D of abdominal wall abscess  Kin Desouza DO General Surgery

## 2020-11-12 LAB
ALBUMIN SERPL BCP-MCNC: 2.8 G/DL (ref 3.2–4.9)
ALBUMIN/GLOB SERPL: 1 G/DL
ALP SERPL-CCNC: 67 U/L (ref 30–99)
ALT SERPL-CCNC: 11 U/L (ref 2–50)
ANION GAP SERPL CALC-SCNC: 11 MMOL/L (ref 7–16)
AST SERPL-CCNC: 17 U/L (ref 12–45)
BACTERIA WND AEROBE CULT: ABNORMAL
BASOPHILS # BLD AUTO: 0.3 % (ref 0–1.8)
BASOPHILS # BLD: 0.04 K/UL (ref 0–0.12)
BILIRUB SERPL-MCNC: 0.5 MG/DL (ref 0.1–1.5)
BUN SERPL-MCNC: 33 MG/DL (ref 8–22)
CALCIUM SERPL-MCNC: 8.3 MG/DL (ref 8.5–10.5)
CHLORIDE SERPL-SCNC: 102 MMOL/L (ref 96–112)
CO2 SERPL-SCNC: 19 MMOL/L (ref 20–33)
CREAT SERPL-MCNC: 2.57 MG/DL (ref 0.5–1.4)
EOSINOPHIL # BLD AUTO: 0.01 K/UL (ref 0–0.51)
EOSINOPHIL NFR BLD: 0.1 % (ref 0–6.9)
ERYTHROCYTE [DISTWIDTH] IN BLOOD BY AUTOMATED COUNT: 55.1 FL (ref 35.9–50)
GLOBULIN SER CALC-MCNC: 2.9 G/DL (ref 1.9–3.5)
GLUCOSE BLD-MCNC: 235 MG/DL (ref 65–99)
GLUCOSE BLD-MCNC: 240 MG/DL (ref 65–99)
GLUCOSE BLD-MCNC: 260 MG/DL (ref 65–99)
GLUCOSE BLD-MCNC: 273 MG/DL (ref 65–99)
GLUCOSE BLD-MCNC: 311 MG/DL (ref 65–99)
GLUCOSE SERPL-MCNC: 308 MG/DL (ref 65–99)
GRAM STN SPEC: ABNORMAL
HCT VFR BLD AUTO: 30.5 % (ref 42–52)
HGB BLD-MCNC: 9.3 G/DL (ref 14–18)
IMM GRANULOCYTES # BLD AUTO: 0.21 K/UL (ref 0–0.11)
IMM GRANULOCYTES NFR BLD AUTO: 1.6 % (ref 0–0.9)
LYMPHOCYTES # BLD AUTO: 1.64 K/UL (ref 1–4.8)
LYMPHOCYTES NFR BLD: 12.4 % (ref 22–41)
MCH RBC QN AUTO: 25.4 PG (ref 27–33)
MCHC RBC AUTO-ENTMCNC: 30.5 G/DL (ref 33.7–35.3)
MCV RBC AUTO: 83.3 FL (ref 81.4–97.8)
MONOCYTES # BLD AUTO: 0.94 K/UL (ref 0–0.85)
MONOCYTES NFR BLD AUTO: 7.1 % (ref 0–13.4)
NEUTROPHILS # BLD AUTO: 10.42 K/UL (ref 1.82–7.42)
NEUTROPHILS NFR BLD: 78.5 % (ref 44–72)
NRBC # BLD AUTO: 0.03 K/UL
NRBC BLD-RTO: 0.2 /100 WBC
PLATELET # BLD AUTO: 309 K/UL (ref 164–446)
PMV BLD AUTO: 10 FL (ref 9–12.9)
POTASSIUM SERPL-SCNC: 5.7 MMOL/L (ref 3.6–5.5)
PROT SERPL-MCNC: 5.7 G/DL (ref 6–8.2)
RBC # BLD AUTO: 3.66 M/UL (ref 4.7–6.1)
SIGNIFICANT IND 70042: ABNORMAL
SITE SITE: ABNORMAL
SODIUM SERPL-SCNC: 132 MMOL/L (ref 135–145)
SOURCE SOURCE: ABNORMAL
WBC # BLD AUTO: 13.3 K/UL (ref 4.8–10.8)

## 2020-11-12 PROCEDURE — 99255 IP/OBS CONSLTJ NEW/EST HI 80: CPT | Performed by: INTERNAL MEDICINE

## 2020-11-12 PROCEDURE — P9047 ALBUMIN (HUMAN), 25%, 50ML: HCPCS | Performed by: SURGERY

## 2020-11-12 PROCEDURE — 700102 HCHG RX REV CODE 250 W/ 637 OVERRIDE(OP): Performed by: SURGERY

## 2020-11-12 PROCEDURE — 85025 COMPLETE CBC W/AUTO DIFF WBC: CPT

## 2020-11-12 PROCEDURE — A9270 NON-COVERED ITEM OR SERVICE: HCPCS | Performed by: SURGERY

## 2020-11-12 PROCEDURE — 700102 HCHG RX REV CODE 250 W/ 637 OVERRIDE(OP): Performed by: INTERNAL MEDICINE

## 2020-11-12 PROCEDURE — 700111 HCHG RX REV CODE 636 W/ 250 OVERRIDE (IP): Performed by: FAMILY MEDICINE

## 2020-11-12 PROCEDURE — 82962 GLUCOSE BLOOD TEST: CPT

## 2020-11-12 PROCEDURE — 302098 PASTE RING (FLAT): Performed by: SURGERY

## 2020-11-12 PROCEDURE — 700111 HCHG RX REV CODE 636 W/ 250 OVERRIDE (IP): Performed by: SURGERY

## 2020-11-12 PROCEDURE — 700102 HCHG RX REV CODE 250 W/ 637 OVERRIDE(OP): Performed by: STUDENT IN AN ORGANIZED HEALTH CARE EDUCATION/TRAINING PROGRAM

## 2020-11-12 PROCEDURE — 90471 IMMUNIZATION ADMIN: CPT

## 2020-11-12 PROCEDURE — A9270 NON-COVERED ITEM OR SERVICE: HCPCS | Performed by: STUDENT IN AN ORGANIZED HEALTH CARE EDUCATION/TRAINING PROGRAM

## 2020-11-12 PROCEDURE — 700101 HCHG RX REV CODE 250: Performed by: SURGERY

## 2020-11-12 PROCEDURE — 770022 HCHG ROOM/CARE - ICU (200)

## 2020-11-12 PROCEDURE — 3E02340 INTRODUCTION OF INFLUENZA VACCINE INTO MUSCLE, PERCUTANEOUS APPROACH: ICD-10-PCS | Performed by: FAMILY MEDICINE

## 2020-11-12 PROCEDURE — 80053 COMPREHEN METABOLIC PANEL: CPT

## 2020-11-12 PROCEDURE — 700105 HCHG RX REV CODE 258: Performed by: SURGERY

## 2020-11-12 PROCEDURE — 97606 NEG PRS WND THER DME>50 SQCM: CPT

## 2020-11-12 PROCEDURE — 99291 CRITICAL CARE FIRST HOUR: CPT | Performed by: SURGERY

## 2020-11-12 PROCEDURE — 90686 IIV4 VACC NO PRSV 0.5 ML IM: CPT | Performed by: FAMILY MEDICINE

## 2020-11-12 PROCEDURE — A9270 NON-COVERED ITEM OR SERVICE: HCPCS | Performed by: INTERNAL MEDICINE

## 2020-11-12 PROCEDURE — 99233 SBSQ HOSP IP/OBS HIGH 50: CPT | Performed by: INTERNAL MEDICINE

## 2020-11-12 RX ORDER — DEXTROSE MONOHYDRATE 25 G/50ML
50 INJECTION, SOLUTION INTRAVENOUS
Status: DISCONTINUED | OUTPATIENT
Start: 2020-11-12 | End: 2020-11-12

## 2020-11-12 RX ORDER — HEPARIN SODIUM 5000 [USP'U]/ML
5000 INJECTION, SOLUTION INTRAVENOUS; SUBCUTANEOUS EVERY 8 HOURS
Status: DISCONTINUED | OUTPATIENT
Start: 2020-11-12 | End: 2020-11-19

## 2020-11-12 RX ORDER — INSULIN GLARGINE 100 [IU]/ML
0.2 INJECTION, SOLUTION SUBCUTANEOUS EVERY EVENING
Status: DISCONTINUED | OUTPATIENT
Start: 2020-11-12 | End: 2020-11-12

## 2020-11-12 RX ORDER — DEXTROSE MONOHYDRATE 25 G/50ML
50 INJECTION, SOLUTION INTRAVENOUS
Status: DISCONTINUED | OUTPATIENT
Start: 2020-11-12 | End: 2020-12-13

## 2020-11-12 RX ORDER — FLUCONAZOLE 200 MG/1
200 TABLET ORAL DAILY
Status: DISCONTINUED | OUTPATIENT
Start: 2020-11-13 | End: 2020-11-13

## 2020-11-12 RX ADMIN — INSULIN HUMAN 7 UNITS: 100 INJECTION, SOLUTION PARENTERAL at 23:36

## 2020-11-12 RX ADMIN — INSULIN HUMAN 4 UNITS: 100 INJECTION, SOLUTION PARENTERAL at 05:05

## 2020-11-12 RX ADMIN — SODIUM CHLORIDE, POTASSIUM CHLORIDE, SODIUM LACTATE AND CALCIUM CHLORIDE 500 ML: 600; 310; 30; 20 INJECTION, SOLUTION INTRAVENOUS at 12:24

## 2020-11-12 RX ADMIN — SODIUM BICARBONATE: 84 INJECTION, SOLUTION INTRAVENOUS at 03:09

## 2020-11-12 RX ADMIN — METRONIDAZOLE 500 MG: 500 TABLET ORAL at 14:38

## 2020-11-12 RX ADMIN — SODIUM CHLORIDE, POTASSIUM CHLORIDE, SODIUM LACTATE AND CALCIUM CHLORIDE 500 ML: 600; 310; 30; 20 INJECTION, SOLUTION INTRAVENOUS at 05:13

## 2020-11-12 RX ADMIN — LINEZOLID 600 MG: 600 TABLET, FILM COATED ORAL at 04:45

## 2020-11-12 RX ADMIN — CEFEPIME 1 G: 1 INJECTION, POWDER, FOR SOLUTION INTRAMUSCULAR; INTRAVENOUS at 14:38

## 2020-11-12 RX ADMIN — SODIUM BICARBONATE: 84 INJECTION, SOLUTION INTRAVENOUS at 20:39

## 2020-11-12 RX ADMIN — METRONIDAZOLE 500 MG: 500 TABLET ORAL at 04:45

## 2020-11-12 RX ADMIN — SODIUM BICARBONATE: 84 INJECTION, SOLUTION INTRAVENOUS at 08:56

## 2020-11-12 RX ADMIN — HEPARIN SODIUM 5000 UNITS: 5000 INJECTION, SOLUTION INTRAVENOUS; SUBCUTANEOUS at 09:08

## 2020-11-12 RX ADMIN — HYDROCORTISONE SODIUM SUCCINATE 50 MG: 100 INJECTION, POWDER, FOR SOLUTION INTRAMUSCULAR; INTRAVENOUS at 00:48

## 2020-11-12 RX ADMIN — HYDROCORTISONE SODIUM SUCCINATE 50 MG: 100 INJECTION, POWDER, FOR SOLUTION INTRAMUSCULAR; INTRAVENOUS at 04:46

## 2020-11-12 RX ADMIN — HEPARIN SODIUM 5000 UNITS: 5000 INJECTION, SOLUTION INTRAVENOUS; SUBCUTANEOUS at 14:37

## 2020-11-12 RX ADMIN — SODIUM CHLORIDE, POTASSIUM CHLORIDE, SODIUM LACTATE AND CALCIUM CHLORIDE 500 ML: 600; 310; 30; 20 INJECTION, SOLUTION INTRAVENOUS at 20:03

## 2020-11-12 RX ADMIN — ALBUMIN (HUMAN) 50 G: 5 SOLUTION INTRAVENOUS at 00:48

## 2020-11-12 RX ADMIN — FENTANYL CITRATE 50 MCG: 50 INJECTION, SOLUTION INTRAMUSCULAR; INTRAVENOUS at 02:02

## 2020-11-12 RX ADMIN — SERTRALINE 125 MG: 100 TABLET, FILM COATED ORAL at 04:46

## 2020-11-12 RX ADMIN — HEPARIN SODIUM 5000 UNITS: 5000 INJECTION, SOLUTION INTRAVENOUS; SUBCUTANEOUS at 21:14

## 2020-11-12 RX ADMIN — FENTANYL CITRATE 50 MCG: 50 INJECTION, SOLUTION INTRAMUSCULAR; INTRAVENOUS at 09:07

## 2020-11-12 RX ADMIN — LINEZOLID 600 MG: 600 TABLET, FILM COATED ORAL at 17:37

## 2020-11-12 RX ADMIN — CEFEPIME 1 G: 1 INJECTION, POWDER, FOR SOLUTION INTRAMUSCULAR; INTRAVENOUS at 21:14

## 2020-11-12 RX ADMIN — INSULIN HUMAN 4 UNITS: 100 INJECTION, SOLUTION PARENTERAL at 17:38

## 2020-11-12 RX ADMIN — FLUCONAZOLE 400 MG: 200 TABLET ORAL at 04:44

## 2020-11-12 RX ADMIN — INSULIN HUMAN 4 UNITS: 100 INJECTION, SOLUTION PARENTERAL at 12:05

## 2020-11-12 RX ADMIN — INSULIN LISPRO 7 UNITS: 100 INJECTION, SOLUTION INTRAVENOUS; SUBCUTANEOUS at 09:01

## 2020-11-12 RX ADMIN — HYDROCORTISONE SODIUM SUCCINATE 50 MG: 100 INJECTION, POWDER, FOR SOLUTION INTRAMUSCULAR; INTRAVENOUS at 12:01

## 2020-11-12 RX ADMIN — SODIUM CHLORIDE, POTASSIUM CHLORIDE, SODIUM LACTATE AND CALCIUM CHLORIDE 500 ML: 600; 310; 30; 20 INJECTION, SOLUTION INTRAVENOUS at 09:08

## 2020-11-12 RX ADMIN — OLANZAPINE 7.5 MG: 5 TABLET, FILM COATED ORAL at 21:14

## 2020-11-12 RX ADMIN — METRONIDAZOLE 500 MG: 500 TABLET ORAL at 21:14

## 2020-11-12 RX ADMIN — SODIUM BICARBONATE: 84 INJECTION, SOLUTION INTRAVENOUS at 14:39

## 2020-11-12 RX ADMIN — INFLUENZA A VIRUS A/GUANGDONG-MAONAN/SWL1536/2019 CNIC-1909 (H1N1) ANTIGEN (FORMALDEHYDE INACTIVATED), INFLUENZA A VIRUS A/HONG KONG/2671/2019 (H3N2) ANTIGEN (FORMALDEHYDE INACTIVATED), INFLUENZA B VIRUS B/PHUKET/3073/2013 ANTIGEN (FORMALDEHYDE INACTIVATED), AND INFLUENZA B VIRUS B/WASHINGTON/02/2019 ANTIGEN (FORMALDEHYDE INACTIVATED) 0.5 ML: 15; 15; 15; 15 INJECTION, SUSPENSION INTRAMUSCULAR at 23:33

## 2020-11-12 RX ADMIN — CEFEPIME 1 G: 1 INJECTION, POWDER, FOR SOLUTION INTRAMUSCULAR; INTRAVENOUS at 04:45

## 2020-11-12 RX ADMIN — DIGOXIN 250 MCG: 0.25 INJECTION INTRAMUSCULAR; INTRAVENOUS at 17:37

## 2020-11-12 RX ADMIN — HYDROCORTISONE SODIUM SUCCINATE 50 MG: 100 INJECTION, POWDER, FOR SOLUTION INTRAMUSCULAR; INTRAVENOUS at 17:37

## 2020-11-12 RX ADMIN — ALBUMIN (HUMAN) 50 G: 5 SOLUTION INTRAVENOUS at 04:45

## 2020-11-12 RX ADMIN — SODIUM CHLORIDE, POTASSIUM CHLORIDE, SODIUM LACTATE AND CALCIUM CHLORIDE 500 ML: 600; 310; 30; 20 INJECTION, SOLUTION INTRAVENOUS at 02:02

## 2020-11-12 RX ADMIN — HYDROCORTISONE SODIUM SUCCINATE 50 MG: 100 INJECTION, POWDER, FOR SOLUTION INTRAMUSCULAR; INTRAVENOUS at 23:35

## 2020-11-12 RX ADMIN — AMIODARONE HYDROCHLORIDE 0.5 MG/MIN: 1.8 INJECTION, SOLUTION INTRAVENOUS at 00:05

## 2020-11-12 RX ADMIN — SODIUM CHLORIDE, POTASSIUM CHLORIDE, SODIUM LACTATE AND CALCIUM CHLORIDE 500 ML: 600; 310; 30; 20 INJECTION, SOLUTION INTRAVENOUS at 15:28

## 2020-11-12 RX ADMIN — INSULIN HUMAN 3 UNITS: 100 INJECTION, SOLUTION PARENTERAL at 00:47

## 2020-11-12 ASSESSMENT — ENCOUNTER SYMPTOMS
COUGH: 0
SENSORY CHANGE: 0
TREMORS: 0
CHILLS: 0
DIARRHEA: 0
MYALGIAS: 0
NAUSEA: 0
FEVER: 0
VOMITING: 0
ABDOMINAL PAIN: 1
SPUTUM PRODUCTION: 0
SHORTNESS OF BREATH: 1
DIZZINESS: 0
CONSTIPATION: 0
HEADACHES: 0
ROS GI COMMENTS: STABLE

## 2020-11-12 ASSESSMENT — PAIN DESCRIPTION - PAIN TYPE
TYPE: SURGICAL PAIN
TYPE: ACUTE PAIN;SURGICAL PAIN

## 2020-11-12 NOTE — PROGRESS NOTES
Trauma / Surgical Daily Progress Note    Date of Service  11/11/2020    Chief Complaint  55 y.o. male admitted 11/3/2020 with Intraabdominal fluid collection    Interval Events  Admitted to ICU post laparotomy with segmental colon resection and diverting colostomy.  Abdominal wall cellulitis associated with intra-abdominal abscess and perforated colon.  Patient is in septic shock on vasopressors with underlying cardiomyopathy and reduced ejection fraction  Remains on vasopressors   Ongoing volume expansion   SRUTHI with hyperkalemia   A fib with RVR ;amiodarone and digoxin   Very complex refractory  Resuscitation   Significant risk for deterioration complications and death  Critical in ICU    Review of Systems  Review of Systems   Unable to perform ROS: Mental status change        Vital Signs for last 24 hours  Temp:  [36.1 °C (96.9 °F)-37.6 °C (99.7 °F)] 37.2 °C (99 °F)  Pulse:  [] 120  Resp:  [6-95] 14  BP: ()/(51-98) 104/57  SpO2:  [83 %-100 %] 92 %    Hemodynamic parameters for last 24 hours  CVP:  [0 MM HG-22 MM HG] 8 MM HG    Respiratory Data     Respiration: 14, Pulse Oximetry: 92 %     Work Of Breathing / Effort: Mild;Shallow  RUL Breath Sounds: Crackles;Diminished, RML Breath Sounds: Diminished, RLL Breath Sounds: Diminished, ELIAS Breath Sounds: Diminished, LLL Breath Sounds: Diminished    Physical Exam  Physical Exam  Vitals signs and nursing note reviewed. Exam conducted with a chaperone present.   Constitutional:       General: He is in acute distress.      Appearance: He is obese. He is ill-appearing and toxic-appearing.   HENT:      Head: Normocephalic.   Eyes:      Pupils: Pupils are equal, round, and reactive to light.   Cardiovascular:      Rate and Rhythm: Regular rhythm. Tachycardia present.      Pulses: Normal pulses.      Comments: hypotensive  Pulmonary:      Effort: Pulmonary effort is normal.   Abdominal:      General: There is distension.      Tenderness: There is abdominal  tenderness. There is guarding.      Comments: Colostomy  Viable   todd bloody   Wound vac   Genitourinary:     Comments: Zabala to dd  Musculoskeletal: Normal range of motion.   Skin:     General: Skin is warm.      Capillary Refill: Capillary refill takes 2 to 3 seconds.   Neurological:      Mental Status: He is disoriented.      Cranial Nerves: No cranial nerve deficit.         Laboratory  Recent Results (from the past 24 hour(s))   ACCU-CHEK GLUCOSE    Collection Time: 11/10/20  5:00 PM   Result Value Ref Range    Glucose - Accu-Ck 106 (H) 65 - 99 mg/dL   CORTISOL    Collection Time: 11/10/20  5:50 PM   Result Value Ref Range    Cortisol 17.0 0.0 - 23.0 ug/dL   LACTIC ACID    Collection Time: 11/10/20  5:50 PM   Result Value Ref Range    Lactic Acid 2.2 (H) 0.5 - 2.0 mmol/L   Basic Metabolic Panel    Collection Time: 11/10/20  5:50 PM   Result Value Ref Range    Sodium 133 (L) 135 - 145 mmol/L    Potassium 5.8 (H) 3.6 - 5.5 mmol/L    Chloride 100 96 - 112 mmol/L    Co2 16 (L) 20 - 33 mmol/L    Glucose 121 (H) 65 - 99 mg/dL    Bun 29 (H) 8 - 22 mg/dL    Creatinine 2.09 (H) 0.50 - 1.40 mg/dL    Calcium 8.6 8.5 - 10.5 mg/dL    Anion Gap 17.0 (H) 7.0 - 16.0   CBC WITH DIFFERENTIAL    Collection Time: 11/10/20  5:50 PM   Result Value Ref Range    WBC 14.5 (H) 4.8 - 10.8 K/uL    RBC 5.00 4.70 - 6.10 M/uL    Hemoglobin 12.7 (L) 14.0 - 18.0 g/dL    Hematocrit 43.1 42.0 - 52.0 %    MCV 86.2 81.4 - 97.8 fL    MCH 25.4 (L) 27.0 - 33.0 pg    MCHC 29.5 (L) 33.7 - 35.3 g/dL    RDW 57.3 (H) 35.9 - 50.0 fL    Platelet Count 472 (H) 164 - 446 K/uL    MPV 10.9 9.0 - 12.9 fL    Neutrophils-Polys 77.10 (H) 44.00 - 72.00 %    Lymphocytes 10.10 (L) 22.00 - 41.00 %    Monocytes 9.90 0.00 - 13.40 %    Eosinophils 0.90 0.00 - 6.90 %    Basophils 0.60 0.00 - 1.80 %    Immature Granulocytes 1.40 (H) 0.00 - 0.90 %    Nucleated RBC 0.00 /100 WBC    Neutrophils (Absolute) 11.19 (H) 1.82 - 7.42 K/uL    Lymphs (Absolute) 1.47 1.00 - 4.80 K/uL     Monos (Absolute) 1.44 (H) 0.00 - 0.85 K/uL    Eos (Absolute) 0.13 0.00 - 0.51 K/uL    Baso (Absolute) 0.09 0.00 - 0.12 K/uL    Immature Granulocytes (abs) 0.21 (H) 0.00 - 0.11 K/uL    NRBC (Absolute) 0.00 K/uL   ESTIMATED GFR    Collection Time: 11/10/20  5:50 PM   Result Value Ref Range    GFR If  40 (A) >60 mL/min/1.73 m 2    GFR If Non  33 (A) >60 mL/min/1.73 m 2   ACCU-CHEK GLUCOSE    Collection Time: 11/11/20 12:26 AM   Result Value Ref Range    Glucose - Accu-Ck 138 (H) 65 - 99 mg/dL   Comp Metabolic Panel    Collection Time: 11/11/20  5:00 AM   Result Value Ref Range    Sodium 129 (L) 135 - 145 mmol/L    Potassium 6.3 (H) 3.6 - 5.5 mmol/L    Chloride 102 96 - 112 mmol/L    Co2 15 (L) 20 - 33 mmol/L    Anion Gap 12.0 7.0 - 16.0    Glucose 159 (H) 65 - 99 mg/dL    Bun 29 (H) 8 - 22 mg/dL    Creatinine 2.19 (H) 0.50 - 1.40 mg/dL    Calcium 8.5 8.5 - 10.5 mg/dL    AST(SGOT) 25 12 - 45 U/L    ALT(SGPT) 13 2 - 50 U/L    Alkaline Phosphatase 95 30 - 99 U/L    Total Bilirubin 0.5 0.1 - 1.5 mg/dL    Albumin 2.2 (L) 3.2 - 4.9 g/dL    Total Protein 6.3 6.0 - 8.2 g/dL    Globulin 4.1 (H) 1.9 - 3.5 g/dL    A-G Ratio 0.5 g/dL   CBC WITH DIFFERENTIAL    Collection Time: 11/11/20  5:00 AM   Result Value Ref Range    WBC 15.4 (H) 4.8 - 10.8 K/uL    RBC 4.92 4.70 - 6.10 M/uL    Hemoglobin 12.5 (L) 14.0 - 18.0 g/dL    Hematocrit 42.2 42.0 - 52.0 %    MCV 85.8 81.4 - 97.8 fL    MCH 25.4 (L) 27.0 - 33.0 pg    MCHC 29.6 (L) 33.7 - 35.3 g/dL    RDW 57.1 (H) 35.9 - 50.0 fL    Platelet Count 504 (H) 164 - 446 K/uL    MPV 10.1 9.0 - 12.9 fL    Neutrophils-Polys 85.20 (H) 44.00 - 72.00 %    Lymphocytes 6.20 (L) 22.00 - 41.00 %    Monocytes 5.50 0.00 - 13.40 %    Eosinophils 0.50 0.00 - 6.90 %    Basophils 0.50 0.00 - 1.80 %    Immature Granulocytes 2.10 (H) 0.00 - 0.90 %    Nucleated RBC 0.30 /100 WBC    Neutrophils (Absolute) 13.15 (H) 1.82 - 7.42 K/uL    Lymphs (Absolute) 0.96 (L) 1.00 - 4.80 K/uL     Monos (Absolute) 0.85 0.00 - 0.85 K/uL    Eos (Absolute) 0.08 0.00 - 0.51 K/uL    Baso (Absolute) 0.08 0.00 - 0.12 K/uL    Immature Granulocytes (abs) 0.32 (H) 0.00 - 0.11 K/uL    NRBC (Absolute) 0.05 K/uL    Hypochromia 1+     Anisocytosis 1+    ACCU-CHEK GLUCOSE    Collection Time: 20  5:00 AM   Result Value Ref Range    Glucose - Accu-Ck 166 (H) 65 - 99 mg/dL   ESTIMATED GFR    Collection Time: 20  5:00 AM   Result Value Ref Range    GFR If  38 (A) >60 mL/min/1.73 m 2    GFR If Non  31 (A) >60 mL/min/1.73 m 2   PERIPHERAL SMEAR REVIEW    Collection Time: 20  5:00 AM   Result Value Ref Range    Peripheral Smear Review see below    PLATELET ESTIMATE    Collection Time: 20  5:00 AM   Result Value Ref Range    Plt Estimation Increased    MORPHOLOGY    Collection Time: 20  5:00 AM   Result Value Ref Range    RBC Morphology Present     Poikilocytosis 2+     Echinocytes 2+    DIFFERENTIAL COMMENT    Collection Time: 20  5:00 AM   Result Value Ref Range    Comments-Diff see below    EKG    Collection Time: 20  6:15 AM   Result Value Ref Range    Report       [facility]    Test Date:  2020  Pt Name:    EMELY FLORES                Department: 19  MRN:        [id number]                      Room:       [facility]  Gender:     Male                         Technician: [facility]  :        1965                   Requested By:PAULA MONTOYA  Order #:    [id number]                    Reading MD: Germain Pacheco MD    Measurements  Intervals                                Axis  Rate:       156                          P:          0  NV:         100                          QRS:        235  QRSD:       123                          T:          82  QT:         327  QTc:        527    Interpretive Statements  ATRIAL FIBRILLATION WITH RAPID VENTRICULAR RESPONSE  LEFT ANTERIOR FASCICULAR BLOCK  NONSPECIFIC INTRAVENTRICULAR CONDUCTION DELAY  DELAYED  PRECORDIAL R WAVE PROGRESSION  Electronically Signed On 11- 8:50:25 PST by Germain Pacheco MD     Basic Metabolic Panel    Collection Time: 11/11/20  9:00 AM   Result Value Ref Range    Sodium 134 (L) 135 - 145 mmol/L    Potassium 6.3 (H) 3.6 - 5.5 mmol/L    Chloride 104 96 - 112 mmol/L    Co2 16 (L) 20 - 33 mmol/L    Glucose 166 (H) 65 - 99 mg/dL    Bun 30 (H) 8 - 22 mg/dL    Creatinine 2.22 (H) 0.50 - 1.40 mg/dL    Calcium 8.3 (L) 8.5 - 10.5 mg/dL    Anion Gap 14.0 7.0 - 16.0   ESTIMATED GFR    Collection Time: 11/11/20  9:00 AM   Result Value Ref Range    GFR If  37 (A) >60 mL/min/1.73 m 2    GFR If Non  31 (A) >60 mL/min/1.73 m 2   ACCU-CHEK GLUCOSE    Collection Time: 11/11/20  9:57 AM   Result Value Ref Range    Glucose - Accu-Ck 166 (H) 65 - 99 mg/dL   Basic Metabolic Panel    Collection Time: 11/11/20 12:10 PM   Result Value Ref Range    Sodium 133 (L) 135 - 145 mmol/L    Potassium 6.1 (H) 3.6 - 5.5 mmol/L    Chloride 105 96 - 112 mmol/L    Co2 16 (L) 20 - 33 mmol/L    Glucose 163 (H) 65 - 99 mg/dL    Bun 30 (H) 8 - 22 mg/dL    Creatinine 2.32 (H) 0.50 - 1.40 mg/dL    Calcium 8.3 (L) 8.5 - 10.5 mg/dL    Anion Gap 12.0 7.0 - 16.0   ESTIMATED GFR    Collection Time: 11/11/20 12:10 PM   Result Value Ref Range    GFR If African American 35 (A) >60 mL/min/1.73 m 2    GFR If Non  29 (A) >60 mL/min/1.73 m 2   EC-ECHOCARDIOGRAM COMPLETE W/ CONT    Collection Time: 11/11/20 12:16 PM   Result Value Ref Range    Eject.Frac. MOD 4C 34.7    ACCU-CHEK GLUCOSE    Collection Time: 11/11/20  1:11 PM   Result Value Ref Range    Glucose - Accu-Ck 173 (H) 65 - 99 mg/dL       Fluids    Intake/Output Summary (Last 24 hours) at 11/11/2020 1615  Last data filed at 11/11/2020 1135  Gross per 24 hour   Intake 5399.8 ml   Output 470 ml   Net 4929.8 ml       Core Measures & Quality Metrics  Labs reviewed, Medications reviewed and Radiology images reviewed  Zabala catheter:  Critically Ill - Requiring Accurate Measurement of Urinary Output  Central line in place: Need for access    DVT Prophylaxis: Contraindicated - High bleeding risk  DVT prophylaxis - mechanical: SCDs  Ulcer prophylaxis: Yes  Antibiotics: Treating active infection/contamination beyond 24 hours perioperative coverage      LEONOR Score    ETOH Screening      Assessment/Plan  Adrenal insufficiency (HCC)  Assessment & Plan  Cortisol 17 in stress state  Hydrocortisone started    Spontaneous perforation of colon (HCC)  Assessment & Plan  Admitted with LUQ abscess and suspicion of perf at splenic flexure  11/9 Drain with stool and unable to hold suction  11/10 Segmental colectomy, colostomy, ventura pouch  I and D of abdominal wall abscess  Wound VAc  LUQ drain  Kin Desouza DO General Surgery    Sepsis ([facility])  Assessment & Plan  Septic shock post op   Vasopressors and fluids     Bacteremia  Assessment & Plan  Likely due to untreated colon perforation    Paroxysmal A-fib with RVR ([facility])- (present on admission)  Assessment & Plan  Recurrent    Amiodarone initiated   RVR improving ,   Add digoxin     SRUTHI (acute kidney injury) ([facility])  Assessment & Plan  Increasing azotemia   Relative oliguria   Hyperkalemia requiring GIK and kayexalte, checking lactate   Continue volume expansion , avoid nephrotoxiins , consider nephrology consult     LV dysfunction  Assessment & Plan  BP low after abscess drainage  Echo ordered    Longstanding persistent atrial fibrillation ([facility])  Assessment & Plan  Cardiology OK with high rates as long as HD stable  But on vasopressors with rate 160s  Amiodarone started, as pt on levophed      Discussed patient condition with RN, RT, Pharmacy and Patient.  CRITICAL CARE TIME EXCLUDING PROCEDURES: 55    Minutes  I independently reviewed pertinent clinical lab tests from the last 48 hours and ordered additional follow up clinical lab tests.  I independently reviewed pertinent radiographic images and the  radiologist's reports from the last 48 hours and ordered additional follow up radiographic studies.  I reviewed the details of the available patient records and documentation by consulting physicians in EPIC up to today, summated the information, and utilized the information as warranted in today's medical decision making for this patient.  I personally evaluated the patient condition at bedside and discussed the daily plan(s) with available nursing staff, dieticians, social workers, pharmacists on rounds.  The patient is critically ill with septic shock, SIRS, multisystem organ failure, acute kidney injury and atrial tachyarrhythmias.  The patient was seen and examined on rounds and discussed with the multidisciplinary critical care team and consulting physicians. Critically evaluated laboratory tests, culture data, medications, imaging, and other diagnostic tests.    The patient has impairment of one or more vital organ systems and a high probability of imminent or life-threatening deterioration in condition. Provided high complexity decision making to assess, manipulate, and support vital system functions to treat vital organ system failure and/or to prevent further life-threatening deterioration of the patient's condition. Requires continued ICU and hospital admission.    Critical care interventions include: integration of multiple data points and associated complex medical decision making, titration of vasopressors, evaluation and direction of antimicrobial therapy for life threatening infection, sepsis resuscitation, management of acute kidney injury and management of critical electrolyte abnormalities.    .

## 2020-11-12 NOTE — PROGRESS NOTES
Surgery    Off vasopressors  Heart rate improved  Urine output remains borderline  Prerenal by labs yesterday: Resuscitation ongoing    Significant incisional pain  No nausea    BP (!) 98/60   Pulse 83   Temp (P) 36.7 °C (98.1 °F) (Temporal)   Resp 20   Ht 1.829 m (6')   Wt (!) 159 kg (350 lb 8.5 oz)   SpO2 97%   BMI 47.54 kg/m²     Drain: 285 cc serosanguineous    Patient awake, no distress  Abdomen very obese  VAC intact with incisional tenderness: VAC taken down by wound care during rounds wound with no sign of infection but no sign of healing either.  VAC replaced by wound care  Drain serosanguineous  I&D site clean, cellulitis resolved: Silver packing    Recent Labs     11/10/20  1750 11/11/20  0500 11/12/20  0500   WBC 14.5* 15.4* 13.3*   RBC 5.00 4.92 3.66*   HEMOGLOBIN 12.7* 12.5* 9.3*   HEMATOCRIT 43.1 42.2 30.5*   MCV 86.2 85.8 83.3   MCH 25.4* 25.4* 25.4*   RDW 57.3* 57.1* 55.1*   PLATELETCT 472* 504* 309   MPV 10.9 10.1 10.0   NEUTSPOLYS 77.10* 85.20* 78.50*   LYMPHOCYTES 10.10* 6.20* 12.40*   MONOCYTES 9.90 5.50 7.10   EOSINOPHILS 0.90 0.50 0.10   BASOPHILS 0.60 0.50 0.30   RBCMORPHOLO  --  Present  --      Recent Labs     11/11/20  1210 11/11/20  1635 11/12/20  0500   SODIUM 133* 130* 132*   POTASSIUM 6.1* 6.7* 5.7*   CHLORIDE 105 101 102   CO2 16* 14* 19*   GLUCOSE 163* 198* 308*   BUN 30* 32* 33*   CREATININE 2.32* 2.42* 2.57*     Assessment and plan:  Postop day 2 after segmental colectomy and colostomy for perforation at the splenic flexure of uncertain etiology.    Small amount of stool in ostomy bag: Probably okay to start clears  Local care for I&D site  Tuesday Thursday Saturday VAC changes for midline wound    Infectious disease managing antibiotics    Start subcu heparin for DVT prophylaxis    Ongoing issues with resuscitation and renal function:?  Nephrology consult    General management per ICU service

## 2020-11-12 NOTE — PROGRESS NOTES
Updated Dr. Maxwell on critical potassium of 6.7.  Orders for STAT urine specimen.   Ca and STAT Renal US also ordered.

## 2020-11-12 NOTE — WOUND TEAM
[facility]  Inpatient Services   Wound and Skin Care Evaluation    Admission Date: 11/3/2020     Last order of IP CONSULT TO WOUND CARE was found on 11/10/2020 from Hospital Encounter on 11/3/2020     HPI, PMH, SH: Reviewed    Unit where seen by Wound Team: [id number]     WOUND CONSULT/FOLLOW UP RELATED TO: Left flank/Abdominal NPWT dressing/Ostomy appliance change     Self Report / Pain Level:  C/o pain and tenderness. Pre medicated w/ IV pain medication prior to dressing change       OBJECTIVE:  NPWT dressing intact. Left flank dressing in place using ad foam and silver strip packing.      WOUND TYPE, LOCATION, CHARACTERISTICS (Pressure Injuries: location, stage, POA or date identified)           Negative Pressure Wound Therapy 11/10/20 Surgical Abdomen Midline (Active)   NPWT Pump Mode / Pressure Setting Continuous;125 mmHg    Dressing Type Black Foam (Regular)    Number of Foam Pieces Used 2    Canister Changed No    NEXT Dressing Change/Treatment Date 11/14/20            Wound 11/10/20 Full Thickness Wound Abdomen Midline wound vac (Active)   Wound Image      Site Assessment Bleeding;Red    Periwound Assessment Intact    Margins Defined edges    Closure Secondary intention    Drainage Amount Moderate    Drainage Description Serosanguineous    Treatments Cleansed;Site care    Wound Cleansing Normal Saline Irrigation    Periwound Protectant Drape;Skin Protectant Wipes to Periwound    Dressing Cleansing/Solutions Not Applicable    Dressing Options Wound Vac    Dressing Changed Changed    Dressing Status Clean;Dry;Intact    Dressing Change/Treatment Frequency Tuesday, Thursday, Saturday, and As Needed    NEXT Dressing Change/Treatment Date 11/14/20    NEXT Weekly Photo (Inpatient Only) 11/19/20    Number of Staples Removed 10    Non-staged Wound Description Full thickness    Wound Length (cm) 21 cm    Wound Width (cm) 6 cm    Wound Depth (cm) 6 cm    Wound Surface Area (cm^2) 126 cm^2    Wound Volume  (cm^3) 756 cm^3    Shape oval    Wound Odor None    Exposed Structures Adipose             Wound 11/10/20 Full Thickness Wound Flank Left (Active)   Wound Image   11/11/20 1130   Site Assessment Pink    Periwound Assessment Pink    Margins Defined edges    Closure Secondary intention    Drainage Amount Small    Drainage Description Serosanguineous    Treatments Cleansed;Site care    Wound Cleansing Approved Wound Cleanser    Periwound Protectant Skin Protectant Wipes to Periwound    Dressing Cleansing/Solutions Not Applicable    Dressing Options Silver Strip Packing;Silicone Adhesive Foam    Dressing Changed Changed    Dressing Status Clean;Dry;Intact    Dressing Change/Treatment Frequency Every Shift, and As Needed    NEXT Dressing Change/Treatment Date 11/12/20    NEXT Weekly Photo (Inpatient Only) 11/18/20    Non-staged Wound Description Full thickness    Wound Length (cm) 0.5 cm 11/11/20 1130   Wound Width (cm) 1 cm 11/11/20 1130   Wound Depth (cm) 1.5 cm 11/11/20 1130   Wound Surface Area (cm^2) 0.5 cm^2 11/11/20 1130   Wound Volume (cm^3) 0.75 cm^3 11/11/20 1130   Tunneling (cm) 3.6 cm 11/11/20 1130   Tunneling Clock Position of Wound 9 11/11/20 1130   Shape oval    Wound Odor None    Exposed Structures VON             Wound 11/12/20 Partial Thickness Wound Buttocks Right Friction/Moisture Related (Active)   Wound Image      Site Assessment Red    Periwound Assessment Blanchable erythema    Margins Defined edges    Closure None    Drainage Amount Scant    Drainage Description Serosanguineous    Treatments Cleansed;Site care;Zinc - oxide paste    Wound Cleansing Approved Wound Cleanser    Periwound Protectant Barrier Paste    Dressing Cleansing/Solutions Not Applicable    Dressing Options Other (Comments)    NEXT Weekly Photo (Inpatient Only) 11/19/20    Non-staged Wound Description Partial thickness    Wound Length (cm) 2 cm    Wound Width (cm) 1 cm    Wound Depth (cm) 0.1 cm    Wound Surface Area (cm^2) 2  cm^2    Wound Volume (cm^3) 0.2 cm^3    Shape irregular    Wound Odor None    Exposed Structures None         Vascular:    SHAYAN:   No results found.    Lab Values:    Lab Results   Component Value Date/Time    WBC 13.3 (H) 11/12/2020 05:00 AM    RBC 3.66 (L) 11/12/2020 05:00 AM    HEMOGLOBIN 9.3 (L) 11/12/2020 05:00 AM    HEMATOCRIT 30.5 (L) 11/12/2020 05:00 AM    CREACTPROT 0.54 10/23/2018 08:02 AM    SEDRATEWES 13 10/23/2018 08:02 AM    HBA1C 7.8 (H) 11/10/2020 03:31 AM        Culture Results show:  Recent Results (from the past 720 hour(s))   CULTURE WOUND W/ GRAM STAIN    Collection Time: 11/04/20  5:30 PM    Specimen: Wound   Result Value Ref Range    Significant Indicator POS (POS)     Source WND     Site Peritoneal Drain     Culture Result - (A)     Gram Stain Result Rare WBCs.  Few Gram positive cocci.  Few Yeast.       Culture Result Enterococcus faecalis  Heavy growth   (A)     Culture Result Candida albicans  Moderate growth   (A)        Susceptibility    Enterococcus faecalis - ISAMAR     Daptomycin 4 Sensitive mcg/mL     Ampicillin <=2 Sensitive mcg/mL     Gent Synergy <=500 Sensitive mcg/mL     Vancomycin 1 Sensitive mcg/mL     Penicillin 2 Sensitive mcg/mL       INTERVENTIONS BY WOUND TEAM:  Removed drape, then 10 staples removed that attached the foam to the abdomen. Foam removed. No retained foam noted. Dr Desouza at bedside to assess abdomen and left flank.  Pt has staples at umbilicus and proximal end of open wound that were left in place. Proximal and distal wound openings communicate under umbilicus area. Pt noted to have moderate drainage at the distal end of large abdominal wound right before wound bed communicates to smaller abdominal wound opening. Cleansed wound and periwound w/ NS and gauze. Mepitel one applied over staples. 1 piece of black strip foam applied on wound bed extending to smaller wound. 1 piece of foam used to fill the rest of the wound bed. Foam secured with drape. Small window  "cut at medial aspect of wound bed where pt was having moderate drainage, trac pad applied. Left flank wound cleansed w/ wound cleanser and gauze. Silver strip packing 1/4\" used to gently pack wound bed, secure dressing w/ Adhesive foam. Ostomy care done (please see ostomy note below). Pt turned to right side, sacrococcygeal area assessed, pt noted to have some pink discoloration however was blanching throughout. Pt noted to have open wound but appears to be partial thickness and may be related to more friction and moisture. Barrier cream applied. Pt positioned back    Interdisciplinary consultation: Patient, Bedside RN, Dr. Desouza.     EVALUATION / RATIONALE FOR TREATMENT: Patient went to OR yesterday with Dr. Desouza after Large bowel perforation at the splenic flexure, abdominal wall abscess.  Pt's first NPWT dressing at bedside after surgery. Wound appears to be clean. Continue NPWT dressing to promote tissue granulation and drainage control.     Goals: Steady decrease in wound area and depth weekly.    NURSING PLAN OF CARE ORDERS (X):    Dressing changes: See UPDATED Dressing Care orders: X  Skin care: See Skin Care orders: X  RN Prevention Protocol: X  Rectal tube care: See Rectal Tube Care orders:   Other orders:    WOUND TEAM PLAN OF CARE:   Dressing changes by wound team:                   Follow up 3 times weekly:                NPWT change 3 times weekly:   ABD  Follow up 1-2 times weekly:    Left flank   Follow up Bi-Monthly:                   Follow up as needed:       Other (explain):     Anticipated discharge plans: will need ongoing wound care post DC  LTACH:        SNF/Rehab:                  Home Health Care:           Outpatient Wound Center:            Self Care:               [facility]  Inpatient Services  New Ostomy Management & Teaching    HPI:  Reviewed  PMH: Reviewed   SH: Reviewed    Subjective:     Objective:  Colostomy 11/10/20 Transverse LUQ (Active)   Wound Image    " "11/11/20 1135   Stomal Appliance Assessment Changed    Stoma Assessment Red    Stoma Shape Budded Less Than One Inch;Round    Stoma Size (in) A little less than 1.75    Peristomal Assessment Intact    Mucocutaneous Junction Intact    Treatment Appliance Changed;Cleansed with water/washcloth;Site care    Peristomal Protectant Paste Ring    Stomal Appliance 2 1/4\" (57mm) CTF;Paste Ring, 2\"    Output (mL) 75    Output Color Green    WOUND RN ONLY - Stomal Appliance  2 Piece;Paste Ring, 2\";2 1/4\" (57mm) CTF    Appliance Brand [employer]    Appliance Supplier [employer]    Secure Start completed Not Medically Stable                                   Ostomy Appliance (type and size):  2 piece 2 1/4 with paste ring      Interventions:  Previous appliance removed, cleansed skin with warm wash cloth. New template created, barrier cut according to template. Paste ring applied. Barrier applied around stoma. Pouch attached and end of bag closed       Pt education: Questions and concerns addressed    Evaluation:  Transverse colostomy after colon perforation post drain placement. Pt was very tender today but discussed hands on teaching. Pt agreeable to start on Saturday. Pt may need mirror when ostomy teaching done. Pt will need secure start.     Plan: Ostomy nurses to continue to follow for ostomy needs and teaching     Anticipated discharge needs: Supplies, supplier information, possible HH or outpatient ostomy clinic   "

## 2020-11-12 NOTE — ASSESSMENT & PLAN NOTE
Increasing azotemia   Relative oliguria   11/11 Hyperkalemia requiring insulin and kayexalate, Na Naco3 infusion   11/12 Continue volume expansion, avoid nephrotoxins, nephrology consult   11/13 Fluids changed to NS, no need for dialysis   Fadi Najjar, MD. Nephrology

## 2020-11-12 NOTE — THERAPY
"Occupational Therapy   Initial Evaluation     Patient Name: Sebastián Castro  Age:  55 y.o., Sex:  male  Medical Record #: [id number]  Today's Date: 11/11/2020       Precautions: Fall Risk, Nasogastric Tube  Comments: multiple Jose drains & wound Vac    Assessment  Patient is 55 y.o. male with a diagnosis of left upper quadrant abd pain & nausea.  Pt is s/p lap with segmental colon resection & diverting colostomy.    Additional factors influencing patient status / progress: Pt had abd wall cellulitis & intra abd abscess & a perforated colon.  PMHx of CHF & morbid obesity & DM 2.   Pt today was co-operative but limited by abd pain.  Pt has difficulty given his size to complete basic ADL's but did reprot he was able to perform basic ADL's & amb PTA.  Today pt required Mod - Max A for basic ADL's & was able to stand with Min A x 2.  Pt has medical issues limiting his independence at this time.    Plan    Recommend Occupational Therapy 3 times per week until therapy goals are met for the following treatments:  Adaptive Equipment, Neuro Re-Education / Balance, Self Care/Activities of Daily Living, Therapeutic Activities and Therapeutic Exercises.    DC Equipment Recommendations: Unable to determine at this time  Discharge Recommendations: Recommend post-acute placement for additional occupational therapy services prior to discharge home     Subjective    \"I'm having a lot of abd pain that limits me\"     Objective       11/11/20 1029   Prior Living Situation   Prior Services Other (Comments);Intermittent Physical Support for ADL Per Service  (Group home)   Equipment Owned Single Point Cane   Comments Pt states he was living in a group home   Prior Level of ADL Function   Comments pt reports he was independent, question how much assist he needed?   Balance Assessment   Sitting Balance (Static) Fair   Sitting Balance (Dynamic) Fair -   Standing Balance (Static) Poor +   Standing Balance (Dynamic) Poor   Weight Shift Sitting Fair "   Weight Shift Standing Poor   Bed Mobility    Supine to Sit Moderate Assist   Sit to Supine Maximal Assist   Scooting Minimal Assist   Rolling Maximal Assist to Rt.;Maximum Assist to Lt.   Comments difficult due to abd pain & girth   ADL Assessment   Grooming Minimal Assist;Seated   Upper Body Dressing Minimal Assist   Lower Body Dressing Maximal Assist   Toileting Total Assist   Functional Mobility   Sit to Stand Minimal Assist  (Min A x 2)   Bed, Chair, Wheelchair Transfer Unable to Participate   Patient / Family Goals   Patient / Family Goal #1 To get my strength back   Short Term Goals   Short Term Goal # 1 Pt will sit EOB & groom wtih supervision   Short Term Goal # 2 Pt will dress LB with AE & Min A   Short Term Goal # 3 Pt will be Mod A with BSC transfers

## 2020-11-12 NOTE — PROGRESS NOTES
Infectious Disease Progress Note    Author: Jane Anderson M.D. Date & Time of service: 2020  8:07 AM    Chief Complaint:  Intrabdominal abscess and bacteremia    Interval History:   55 y.o. diabetic male admitted 11/3/2020.+ A. fib, CHD, and severe morbid obesity.  Seen by ID in the past for right foot abscess with cultures positive for MRSA and Proteus.  s/p GLF approximately 1 month ago and [place] [facility] pain.  CT at the time revealed a subcapsular splenic hematoma. Presented to ED complaining of abdominal pain described as 4/5 out of 10 dull and constant located in the left upper quadrant.     AF WBC 9.4 Less pain in side but remains tender to touch. Denies SE Zyvox-counseled about sxs serotonin syndrome   T-max 99.7 WBC 15.4 patient underwent colectomy, colostomy placement and I&D of flank wound with wound VAC placement on 11/10/2020 by Dr. Desouza.  Postop course complicated by hypotension and transferred to the ICU.  On pressors.  Patient complaining of shortness of breath as well as lightheadedness.  Also has postop abdominal pain.   afebrile WBC 13.3.  Patient weaned off pressors overnight.  Operative cultures growing group D Enterococcus and yeast.  Patient feeling better today.  Improved shortness of breath.  Abdominal pain stable    Review of Systems:  Review of Systems   Constitutional: Positive for malaise/fatigue. Negative for chills and fever.   Respiratory: Positive for shortness of breath. Negative for cough and sputum production.         Improved   Gastrointestinal: Positive for abdominal pain. Negative for constipation, diarrhea, nausea and vomiting.        Stable   Musculoskeletal: Negative for myalgias.   Neurological: Negative for dizziness, tremors, sensory change and headaches.   All other systems reviewed and are negative.      Hemodynamics:  Temp (24hrs), Av °C (98.6 °F), Min:36.7 °C (98.1 °F), Max:37.2 °C (99  °F)  Temperature: (P) 36.7 °C (98.1 °F)  Pulse  Av  Min: 51  Max: 161   Blood Pressure: (!) 98/60  CVP (mm Hg): (!) 20 MM HG    Physical Exam:  Physical Exam  Vitals signs and nursing note reviewed.   Constitutional:       General: He is not in acute distress.     Appearance: He is obese. He is ill-appearing. He is not diaphoretic.   HENT:      Nose: No rhinorrhea.      Mouth/Throat:      Pharynx: No oropharyngeal exudate.   Eyes:      General: No scleral icterus.     Extraocular Movements: Extraocular movements intact.      Pupils: Pupils are equal, round, and reactive to light.   Cardiovascular:      Rate and Rhythm: Tachycardia present. Rhythm irregular.   Pulmonary:      Effort: No respiratory distress.      Comments: Decreased BS bilaterally    Right upper chest tunneled catheter-nontender, no surrounding erythema  Abdominal:      General: There is no distension.      Palpations: Abdomen is soft.      Tenderness: There is abdominal tenderness. There is no guarding.      Comments: Midline abdominal surgical site with wound VAC in place, colostomy bag.  Left lower quadrant drain with bloody drainage in bulb   Musculoskeletal:      Right lower leg: Edema present.      Left lower leg: Edema present.      Comments: Evidence of prior toe amputations.  Sites clean   Skin:     General: Skin is warm.      Coloration: Skin is not jaundiced.      Findings: Bruising and erythema present.   Neurological:      General: No focal deficit present.      Mental Status: He is alert and oriented to person, place, and time.   Psychiatric:         Mood and Affect: Mood normal.         Behavior: Behavior normal.         Meds:    Current Facility-Administered Medications:   •  insulin glargine **AND** insulin lispro **AND** POC Blood Glucose **AND** NOTIFY MD and PharmD **AND** glucose **AND** dextrose 50%  •  metroNIDAZOLE  •  digoxin  •  sodium bicarbonate 150 meq in D5W 1000 mL  •  norepinephrine (Levophed) infusion  •   cefepime (MAXIPIME) IVPB  •  albuterol  •  amiodarone infusion  •  fentaNYL  •  LR  •  hydrocortisone sodium succinate PF  •  vasopressin (PITRESSIN) infusion  •  ondansetron  •  influenza vaccine quad  •  linezolid  •  ondansetron  •  fluconazole  •  OLANZapine  •  sertraline    Labs:  Recent Labs     11/10/20  1750 11/11/20  0500 11/12/20  0500   WBC 14.5* 15.4* 13.3*   RBC 5.00 4.92 3.66*   HEMOGLOBIN 12.7* 12.5* 9.3*   HEMATOCRIT 43.1 42.2 30.5*   MCV 86.2 85.8 83.3   MCH 25.4* 25.4* 25.4*   RDW 57.3* 57.1* 55.1*   PLATELETCT 472* 504* 309   MPV 10.9 10.1 10.0   NEUTSPOLYS 77.10* 85.20* 78.50*   LYMPHOCYTES 10.10* 6.20* 12.40*   MONOCYTES 9.90 5.50 7.10   EOSINOPHILS 0.90 0.50 0.10   BASOPHILS 0.60 0.50 0.30   RBCMORPHOLO  --  Present  --      Recent Labs     11/11/20  1210 11/11/20  1635 11/12/20  0500   SODIUM 133* 130* 132*   POTASSIUM 6.1* 6.7* 5.7*   CHLORIDE 105 101 102   CO2 16* 14* 19*   GLUCOSE 163* 198* 308*   BUN 30* 32* 33*     Recent Labs     11/10/20  1129 11/10/20  1129 11/11/20  0500 11/11/20  0500 11/11/20  1210 11/11/20  1635 11/12/20  0500   ALBUMIN 2.2*  --  2.2*  --   --   --  2.8*   TBILIRUBIN 0.5  --  0.5  --   --   --  0.5   ALKPHOSPHAT 88  --  95  --   --   --  67   TOTPROTEIN 6.1  --  6.3  --   --   --  5.7*   ALTSGPT 10  --  13  --   --   --  11   ASTSGOT 17  --  25  --   --   --  17   CREATININE 1.84*   < > 2.19*   < > 2.32* 2.42* 2.57*    < > = values in this interval not displayed.       Imaging:  Ct-abdomen-pelvis With    Addendum Date: 11/5/2020    Addendum: A prior noncontrast CT of the abdomen and pelvis from [facility] dated 10/19/2020 was made available for comparison. On the prior study a perisplenic/subcapsular fluid collection was seen however did not contain air at that time and there was no discrete evidence of communication with the transverse colon. Communication with splenic flexure of the colon and air within the collection are new from the prior  study.    Result Date: 11/5/2020  11/3/2020 4:02 PM HISTORY/REASON FOR EXAM:  Abd pain, unspecified; IV contrast only. TECHNIQUE/EXAM DESCRIPTION:   CT scan of the abdomen and pelvis with contrast. Contrast-enhanced helical scanning was obtained from the diaphragmatic domes through the pubic symphysis following the bolus administration of nonionic contrast without complication. 100 mL of Omnipaque 350 nonionic contrast was administered without complication. Low dose optimization technique was utilized for this CT exam including automated exposure control and adjustment of the mA and/or kV according to patient size. COMPARISON: 9/26/2018 FINDINGS: Chest Base: Small left pleural effusion with mild bibasilar atelectasis. Heart is mildly enlarged. Liver:  Diffuse hypoattenuation of the liver suggesting fatty infiltration. Moderately enlarged. Gallbladder: Cholelithiasis. Biliary tract: Nondilated. Pancreas: Normal. Spleen: There is a rim-enhancing perisplenic fluid collection which contains air and which communicates with the splenic flexure of the colon. This most likely represents an abscess and measures about 8.7 x 7.9 cm. The anterior aspect of the spleen is nonenhancing with irregular margins could be related to infection of splenic parenchyma or infarction. Adrenals: Normal. Kidneys and Collecting Systems:  There is mild right renal cortical scarring. No hydronephrosis. No renal mass. Gastrointestinal tract:  No bowel obstruction. The appendix is normal. Peritoneum: No free air or free fluid. Reproductive organs:  Normal. Bladder:  Normal. Vessels:  Normal caliber. Lymph  Nodes:  No lymphadenopathy. Abdominal wall: Within normal limits. Bones:  Age indeterminate possibly subacute to chronic T11 and T12 superior endplate compression fractures.     1.  Peripherally enhancing perisplenic fluid collection which communicates with the splenic flexure of the colon and which contains foci of air is most consistent with an  abscess/infected fluid collection. There is nonenhancement of the anterior aspect of the spleen suggesting infection/infarction. Surgical consultation is recommended. 2.  Hepatomegaly and hepatic steatosis. 3.  Cholelithiasis. 4.  Right renal cortical scarring. No hydronephrosis. 5.  Small left pleural effusion. Mild bibasilar atelectasis. 6.  Subacute to chronic appearing T11 and T12 superior endplate compression fractures. These findings were discussed with SVETLANA PAREDES on 11/3/2020 4:49 PM.     Ct-drain-peritoneal    Result Date: 11/4/2020 11/4/2020 5:19 PM HISTORY/REASON FOR EXAM:  Joya Splenic Fluid Collection TECHNIQUE/EXAM DESCRIPTION: LUQ parasplenic abscess drainage with CT guidance. Low dose optimization technique was utilized for this CT exam including automated exposure control and adjustment of the mA and/or kV according to patient size. PROCEDURE: Informed consent was obtained. SEDATION: Moderate sedation was provided. Pulse oximetry and continuous cardiac monitoring by the nurse was performed throughout the exam. Intraservice time was 30 minutes. Localizing CT images were obtained with the patient in supine position. The skin was prepped with Betadine and draped in a sterile fashion. Following local anesthesia with 1% Lidocaine, a 17 G guiding needle was placed and needle path confirmed with CT. An Amplatz guidewire was placed and following serial tract dilatation, a 10 Azeri pigtail locking catheter was placed. A specimen was collected and submitted for culture and sensitivity and Gram stain. Total of 6 mL of Brownish fluid was drained. The catheter was secured to the skin and connected to suction bulb drainage. Final CT images were obtained documenting catheter position. The patient tolerated the procedure well with no evidence of complication. COMPARISON: CT scan abdomen and pelvis 11/3/2020 FINDINGS: The final CT images show satisfactory catheter position within the target collection.      "    1.  CT guided perisplenic abscess catheter drainage. 2.  The current plan is to obtain a follow-up CT in 5-7 days..    Dx-chest-portable (1 View)    Result Date: 11/3/2020  11/3/2020 3:41 PM HISTORY/REASON FOR EXAM:  Chest Pain. TECHNIQUE/EXAM DESCRIPTION AND NUMBER OF VIEWS: Single portable view of the chest. COMPARISON: September 10, 2020 FINDINGS: The cardiac size is enlarged. There is interstitial prominence. Lung aeration has improved. No pneumothorax. No obvious pleural effusion.     Slight interval interval improvement in congestive heart failure.      Micro:  Results     Procedure Component Value Units Date/Time    CULTURE WOUND W/ GRAM STAIN [[id number]]  (Abnormal) Collected: 11/10/20 1039    Order Status: Completed Specimen: Wound Updated: 11/11/20 1320     Significant Indicator POS     Source WND     Site COLON PERFORATION     Culture Result -     Gram Stain Result Rare WBCs.  Rare Gram positive cocci.  Rare Gram positive rods.       Culture Result Group D Enterococcus species  Light growth        Yeast  Light growth      Anaerobic Culture [[id number]] Collected: 11/10/20 1039    Order Status: Completed Specimen: Wound Updated: 11/11/20 1320     Significant Indicator NEG     Source WND     Site COLON PERFORATION     Culture Result Culture in progress.    BLOOD CULTURE [[id number]] Collected: 11/05/20 1950    Order Status: Completed Specimen: Blood from Peripheral Updated: 11/10/20 2100     Significant Indicator NEG     Source BLD     Site PERIPHERAL     Culture Result No growth after 5 days of incubation.    Narrative:      Per Hospital Policy: Only change Specimen Src: to \"Line\" if  specified by physician order.  Right AC    BLOOD CULTURE [[id number]] Collected: 11/05/20 1950    Order Status: Completed Specimen: Blood from Peripheral Updated: 11/10/20 2100     Significant Indicator NEG     Source BLD     Site PERIPHERAL     Culture Result No growth after 5 days of incubation.    Narrative:      Per " "Hospital Policy: Only change Specimen Src: to \"Line\" if  specified by physician order.  Right Hand    GRAM STAIN [[id number]] Collected: 11/10/20 1039    Order Status: Completed Specimen: Wound Updated: 11/10/20 1739     Significant Indicator .     Source WND     Site COLON PERFORATION     Gram Stain Result Rare WBCs.  Rare Gram positive cocci.  Rare Gram positive rods.      SARS-CoV-2, PCR (In-House) [[id number]] Collected: 11/09/20 1855    Order Status: Completed Updated: 11/09/20 2252     SARS-CoV-2 Source NP Swab     SARS-CoV-2 by PCR NotDetected     Comment: [facility] providers: PLEASE REFER TO DE-ESCALATION AND RETESTING PROTOCOL  on [email or web address]  **The TaqPath COVID-19 SARS-CoV-2 test has been made available for use under  the Emergency Use Authorization (EUA) only.         Narrative:      Collected By:[id number] ZULY MOURA  Is patient being admitted?->Yes  Does this patient meet criteria for Rush/Cepheid per [facility]  Inpatient Workflow? (See workflow link below)->Yes  Expected turn around time?->Rush (Cepheid 2-4 hours)  Is this the patients First SARS CoV-2 test?->No  Is this patient employed in healthcare?->No  Is the patient symptomatic as defined by the CDC?->No  Is the patient hospitalized?->Yes  Is the patient in the ICU?->No  Is the patient a resident in a congregate care setting?->No  Is the patient pregnant?->No    COVID/SARS CoV-2 PCR [[id number]] Collected: 11/09/20 1855    Order Status: Completed Specimen: Respirate from Nasopharyngeal Updated: 11/09/20 1907     COVID Order Status Received     Comment: The order for SARS CoV-2 testing has been received by the  Laboratory. This result is neither positive nor negative.  Final results of testing will report in 24-48 hours, separately.         Narrative:      Collected By:[id number] ZULY MOURA  Is patient being admitted?->Yes  Does this patient meet criteria for Rush/Cepheid per [facility]  Inpatient Workflow? (See workflow link " "below)->Yes  Expected turn around time?->Rush (Cepheid 2-4 hours)  Is this the patients First SARS CoV-2 test?->No  Is this patient employed in healthcare?->No  Is the patient symptomatic as defined by the CDC?->No  Is the patient hospitalized?->Yes  Is the patient in the ICU?->No  Is the patient a resident in a congregate care setting?->No  Is the patient pregnant?->No    BLOOD CULTURE [[id number]] Collected: 11/03/20 1626    Order Status: Completed Specimen: Blood from Peripheral Updated: 11/08/20 1900     Significant Indicator NEG     Source BLD     Site PERIPHERAL     Culture Result No growth after 5 days of incubation.    Narrative:      Per Hospital Policy: Only change Specimen Src: to \"Line\" if  specified by physician order.  No site indicated    Aerobic/Anaerobic Culture (Surgery) [[id number]] Collected: 11/04/20 1730    Order Status: Completed Specimen: Wound from Peritoneal Fluid Updated: 11/08/20 0829     Significant Indicator NEG     Source WND     Site Peritoneal Drain     Culture Result Order placed in Surgery. Source appropriate culture orders  have been placed in Micro for testing.      Narrative:      Special Contact [id number] MAXINE ELLIS  Brown fluid from peritoneal drain  Special Contact [id number] MAXINE ELLIS    Anaerobic Culture [[id number]] Collected: 11/04/20 1730    Order Status: Completed Specimen: Wound Updated: 11/08/20 0829     Significant Indicator NEG     Source WND     Site Peritoneal Drain     Culture Result No Anaerobes isolated.    Narrative:      Special Contact [id number] MAXINE BURKSANGELICA ELLIS  Brown fluid from peritoneal drain  Special Contact [id number] MAXINE BURKSONY J    CULTURE WOUND W/ GRAM STAIN [[id number]]  (Abnormal)  (Susceptibility) Collected: 11/04/20 1730    Order Status: Completed Specimen: Wound Updated: 11/08/20 0829     Significant Indicator POS     Source WND     Site Peritoneal Drain     Culture Result -     Gram Stain Result Rare WBCs.  Few " Gram positive cocci.  Few Yeast.       Culture Result Enterococcus faecalis  Heavy growth        Candida albicans  Moderate growth      Narrative:      Special Contact [id number] MAXINE ELLIS  Brown fluid from peritoneal drain  Special Contact [id number] MAXINE GRANT ELLIS    Susceptibility     Enterococcus faecalis (1)     Antibiotic Interpretation Microscan Method Status    Daptomycin Sensitive 4 mcg/mL ISAMAR Final    Ampicillin Sensitive <=2 mcg/mL ISAMAR Final    Gent Synergy Sensitive <=500 mcg/mL ISAMAR Final    Vancomycin Sensitive 1 mcg/mL ISAMAR Final    Penicillin Sensitive 2 mcg/mL ISAMAR Final                   CULTURE STOOL [[id number]] Collected: 11/04/20 1609    Order Status: Completed Specimen: Stool Updated: 11/07/20 1608     Significant Indicator NEG     Source STL     Site STOOL     Culture Result Shiga Toxin testing not performed due to lack of growth in  [place] broth.  No enteric pathogens, only usual Gram positive enteric  joselin isolated.  NOTE:  Stool cultures are screened for Shiga Toxins 1 and 2,  Salmonella, Shigella, Campylobacter, Aeromonas,  Plesiomonas, and Vibrio.      Narrative:      Special Contact [id number] [facility]  Does this patient have risk factors for C-diff?->Yes  Special Contact [id number] [facility]    URINALYSIS [[id number]]  (Abnormal) Collected: 11/06/20 1235    Order Status: Completed Specimen: Urine, Clean Catch Updated: 11/06/20 1327     Color DK Yellow     Character Clear     Specific Gravity 1.019     Ph 5.0     Glucose Negative mg/dL      Ketones Negative mg/dL      Protein Negative mg/dL      Bilirubin Negative     Urobilinogen, Urine 0.2     Nitrite Negative     Leukocyte Esterase Trace     Occult Blood Negative     Micro Urine Req Microscopic    Narrative:      Collected By:[id number] JF MURDOCK    BLOOD CULTURE [[id number]]  (Abnormal) Collected: 11/03/20 1528    Order Status: Completed Specimen: Blood from Peripheral Updated: 11/06/20  "1141     Significant Indicator POS     Source BLD     Site PERIPHERAL     Culture Result Growth detected by Bactec instrument.  11/04/2020  12:52      Viridans Streptococcus  Possible Contaminant  Isolated from one bottle only, correlate with clinical  condition.      Narrative:      CALL  [place]  183 tel. [phone],  CALLED  183 tel. [phone] 11/04/2020, 12:59, RB PERF. RESULTS CALLED TO:  [id number], RN  Per Hospital Policy: Only change Specimen Src: to \"Line\" if  specified by physician order.  No site indicated    URINE CULTURE(NEW) [[id number]] Collected: 11/03/20 1707    Order Status: Completed Specimen: Urine Updated: 11/06/20 0749     Significant Indicator NEG     Source UR     Site -     Culture Result Mixed skin joselin <10,000 cfu/mL    Narrative:      Indication for culture:->Emergency Room Patient  Indication for culture:->Emergency Room Patient           ASSESSMENT/PLAN:     Hospital Course:   CT scan on 11/3 revealed a peripherally enhancing perisplenic fluid collection measuring 8.7 x 7.9 cm, communicating with the splenic flexure of the colon and contained foci of air consistent with abscess/infected fluid.  Also noted nonenhancing of the anterior aspect of the spleen suggesting infection/infarction.    Status post surgery on 11/10/2020    Postop hypotension, resolved  Leukocytosis improved  Weaned off pressors  On antibiotics below    Acute kidney injury, worse  Renally dose abx accordingly  Avoid nephrotoxins  Monitor    Bacteremia  -Blood cultures on 11/31/2 + viridans streptococci, possible contaminant  -Blood culture on 11/5 with no growth to date    Perisplenic abscess with possible communication to the bowel  Left upper quadrant abscess  Afebrile  No current leukocytosis  Splenic enhancement on CT, suggesting infection or infarction  -Drainage on 11/4 and peritoneal fluid Enterococcus faecalis, ampicillin sensitive and  Calbicans  Recent ground-level fall located by a subcapsular splenic " hematoma  Continue zyvox and fluconazole  Monitor CBC while on linezolid  Cefepime and Flagyl added by primary team on 11/10-continue for now pending final cultures  Status post segmental colectomy, colostomy, irrigation and debridement of flank wound and wound VAC placement on 11/10/2020 by Dr. Desouza.  Patient found to have a left upper quadrant abscess inferior to the spleen with a large bowel wall defect.  Also noted to have an abdominal wall abscess.  Operative cultures (colon perforation) -group D Enterococcus and yeast.  Gram stain+ GPC, GPR  Follow drain output and repeat CT a/p in 5-7 days    Type II diabetes mellitus  Keep BS under 150 to help control current infection    A. Fib  Contributing to hypotension  Cards following     Diarrhea  -C. difficile tested negative on 11/4    Antibiotic allergies: Daptomycin rash, penicillins rash and hives, tolerates cephalosporins-discussed allergies and he states that he has developed significant rash and hives with penicillins several times, most recently a year and a half ago- will avoid penicillins     I have performed a physical exam and reviewed and updated ROS and plan today 11/12/2020.  In review of yesterday's note 11/11/2020, there are no changes except as documented above.      Plan of care discussed with bedside RN.

## 2020-11-12 NOTE — CONSULTS
DATE OF SERVICE:  11/12/2020    REQUESTING PHYSICIAN:  Long Maxwell MD    REASON FOR CONSULTATION:  Acute kidney injury and hyperkalemia.    The patient seen and examined, medical record reviewed.    HISTORY OF PRESENT ILLNESS:  The patient is an unfortunate 55-year-old   gentleman with a past medical history significant for atrial fibrillation,   diabetes mellitus, presented to the hospital on 11/03/2020 with abdominal   pain.  Patient's hospitalization has been complicated by sepsis and large   bowel perforation, underwent surgical repair, he also developed acute kidney   injury and hyperkalemia.  His potassium yesterday was 6.7.    Patient has no chest pain, no shortness stress, no fever, no chills, no   hematuria, no dysuria.  His urine output has been decreasing.    Patient had an abdominal CT scan with IV contrast on 11/08.    PAST MEDICAL HISTORY:  Significant for:  1.  Diabetes.  2.  Obesity.  3.  Cardiomyopathy.    ALLERGIES:  The list was reviewed.    SOCIAL HISTORY:  Patient has no smoking history.    FAMILY HISTORY:  No known renal disease.    MEDICATIONS:  Reviewed.    REVIEW OF SYSTEMS:  The patient is tired.  He is fatigued.  All other review   of system is negative except outlined in the history of present illness.    PHYSICAL EXAMINATION:  GENERAL:  Patient is awake, alert, in no apparent distress.  VITAL SIGNS:  Showed blood pressure of 97/66, heart rate was 97, respiratory   rate was 17.  HEENT:  Normocephalic, atraumatic.  Sclerae are anicteric.  Pupils are   reactive.  Nose is normal.  Mucous membranes moist.  NECK:  No lymphadenopathy, no JVD, no thyroid mass.  CHEST:  Normal.  LUNGS:  Clear to auscultation.  HEART:  S1, S2, regular rate.  ABDOMEN:  Soft, nontender, no hepatosplenomegaly.  There is no inguinal   lymphadenopathy.  EXTREMITIES:  There is +1 edema.  SKIN:  Multiple ecchymosis.  NEUROLOGIC:  Patient is alert and oriented x3.  MOOD:  Patient is anxious.    LABORATORY DATA:  His  recent labs from today were reviewed.  He also had a   renal ultrasound done on 11/11.  I reviewed the image myself, which showed no   hydronephrosis.    ASSESSMENT AND PLAN:  1.  Acute kidney injury.  The etiology is probably a combination of   contrast-induced nephropathy from IV contrast patient received on 11/08 and   also acute tubular necrosis.  2.  Hyperkalemia secondary to renal failure.  3.  Metabolic acidosis.  4.  Leukocytosis.  5.  Septic shock.  6.  Anemia.    PLAN:  1.  There is no acute need for renal replacement therapy.  2.  Continue fluid resuscitation with bicarbonate solution; however, we need   to be careful in the next 12-24 hours as patient's renal output has decreased.    If urine output is still low, I would recommend to stop IV fluid.  3.  Daily labs.  4.  Renal dose all medications.  5.  Avoid nephrotoxin.  6.  If there is no improvement in the kidney function in the next 24-48 hours,   we will consider dialysis.  7.  Prognosis is guarded.    Plan discussed in detail with Dr. Maxwell who I would like to thank for   consulting this very interesting case.       ____________________________________     FADI NAJJAR, MD FN / NOEMY    DD:  11/12/2020 14:01:34  DT:  11/12/2020 15:18:39    D#:  [id number]  Job#:  [id number]

## 2020-11-13 LAB
ALBUMIN SERPL BCP-MCNC: 2.9 G/DL (ref 3.2–4.9)
ALBUMIN/GLOB SERPL: 1 G/DL
ALP SERPL-CCNC: 63 U/L (ref 30–99)
ALT SERPL-CCNC: 7 U/L (ref 2–50)
AMYLASE FLD-CCNC: 18 U/L
ANION GAP SERPL CALC-SCNC: 10 MMOL/L (ref 7–16)
AST SERPL-CCNC: 17 U/L (ref 12–45)
BASOPHILS # BLD AUTO: 0.1 % (ref 0–1.8)
BASOPHILS # BLD: 0.02 K/UL (ref 0–0.12)
BILIRUB SERPL-MCNC: 0.4 MG/DL (ref 0.1–1.5)
BODY FLD TYPE: NORMAL
BUN SERPL-MCNC: 39 MG/DL (ref 8–22)
CALCIUM SERPL-MCNC: 8.1 MG/DL (ref 8.5–10.5)
CHLORIDE SERPL-SCNC: 98 MMOL/L (ref 96–112)
CO2 SERPL-SCNC: 23 MMOL/L (ref 20–33)
CREAT SERPL-MCNC: 2.84 MG/DL (ref 0.5–1.4)
EOSINOPHIL # BLD AUTO: 0 K/UL (ref 0–0.51)
EOSINOPHIL NFR BLD: 0 % (ref 0–6.9)
ERYTHROCYTE [DISTWIDTH] IN BLOOD BY AUTOMATED COUNT: 55.1 FL (ref 35.9–50)
GLOBULIN SER CALC-MCNC: 2.9 G/DL (ref 1.9–3.5)
GLUCOSE BLD-MCNC: 242 MG/DL (ref 65–99)
GLUCOSE BLD-MCNC: 258 MG/DL (ref 65–99)
GLUCOSE BLD-MCNC: 266 MG/DL (ref 65–99)
GLUCOSE BLD-MCNC: 293 MG/DL (ref 65–99)
GLUCOSE SERPL-MCNC: 270 MG/DL (ref 65–99)
HCT VFR BLD AUTO: 32.9 % (ref 42–52)
HGB BLD-MCNC: 10.3 G/DL (ref 14–18)
IMM GRANULOCYTES # BLD AUTO: 0.28 K/UL (ref 0–0.11)
IMM GRANULOCYTES NFR BLD AUTO: 2 % (ref 0–0.9)
LYMPHOCYTES # BLD AUTO: 1.37 K/UL (ref 1–4.8)
LYMPHOCYTES NFR BLD: 10 % (ref 22–41)
MCH RBC QN AUTO: 26 PG (ref 27–33)
MCHC RBC AUTO-ENTMCNC: 31.3 G/DL (ref 33.7–35.3)
MCV RBC AUTO: 83.1 FL (ref 81.4–97.8)
MONOCYTES # BLD AUTO: 0.82 K/UL (ref 0–0.85)
MONOCYTES NFR BLD AUTO: 6 % (ref 0–13.4)
NEUTROPHILS # BLD AUTO: 11.27 K/UL (ref 1.82–7.42)
NEUTROPHILS NFR BLD: 81.9 % (ref 44–72)
NRBC # BLD AUTO: 0.07 K/UL
NRBC BLD-RTO: 0.5 /100 WBC
PLATELET # BLD AUTO: 281 K/UL (ref 164–446)
PMV BLD AUTO: 9.9 FL (ref 9–12.9)
POTASSIUM SERPL-SCNC: 5.1 MMOL/L (ref 3.6–5.5)
PROT SERPL-MCNC: 5.8 G/DL (ref 6–8.2)
RBC # BLD AUTO: 3.96 M/UL (ref 4.7–6.1)
SODIUM SERPL-SCNC: 131 MMOL/L (ref 135–145)
WBC # BLD AUTO: 13.8 K/UL (ref 4.8–10.8)

## 2020-11-13 PROCEDURE — 97530 THERAPEUTIC ACTIVITIES: CPT

## 2020-11-13 PROCEDURE — 700105 HCHG RX REV CODE 258: Performed by: INTERNAL MEDICINE

## 2020-11-13 PROCEDURE — A9270 NON-COVERED ITEM OR SERVICE: HCPCS | Performed by: INTERNAL MEDICINE

## 2020-11-13 PROCEDURE — A9270 NON-COVERED ITEM OR SERVICE: HCPCS | Performed by: SURGERY

## 2020-11-13 PROCEDURE — 700102 HCHG RX REV CODE 250 W/ 637 OVERRIDE(OP): Performed by: STUDENT IN AN ORGANIZED HEALTH CARE EDUCATION/TRAINING PROGRAM

## 2020-11-13 PROCEDURE — 82962 GLUCOSE BLOOD TEST: CPT

## 2020-11-13 PROCEDURE — 80053 COMPREHEN METABOLIC PANEL: CPT

## 2020-11-13 PROCEDURE — 700102 HCHG RX REV CODE 250 W/ 637 OVERRIDE(OP): Performed by: SURGERY

## 2020-11-13 PROCEDURE — 82150 ASSAY OF AMYLASE: CPT

## 2020-11-13 PROCEDURE — 700105 HCHG RX REV CODE 258: Performed by: SURGERY

## 2020-11-13 PROCEDURE — 700111 HCHG RX REV CODE 636 W/ 250 OVERRIDE (IP): Performed by: SURGERY

## 2020-11-13 PROCEDURE — 770022 HCHG ROOM/CARE - ICU (200)

## 2020-11-13 PROCEDURE — 700101 HCHG RX REV CODE 250: Performed by: SURGERY

## 2020-11-13 PROCEDURE — 700102 HCHG RX REV CODE 250 W/ 637 OVERRIDE(OP): Performed by: INTERNAL MEDICINE

## 2020-11-13 PROCEDURE — 99232 SBSQ HOSP IP/OBS MODERATE 35: CPT | Performed by: INTERNAL MEDICINE

## 2020-11-13 PROCEDURE — A9270 NON-COVERED ITEM OR SERVICE: HCPCS | Performed by: STUDENT IN AN ORGANIZED HEALTH CARE EDUCATION/TRAINING PROGRAM

## 2020-11-13 PROCEDURE — 85025 COMPLETE CBC W/AUTO DIFF WBC: CPT

## 2020-11-13 PROCEDURE — 99291 CRITICAL CARE FIRST HOUR: CPT | Performed by: SURGERY

## 2020-11-13 PROCEDURE — 99233 SBSQ HOSP IP/OBS HIGH 50: CPT | Performed by: INTERNAL MEDICINE

## 2020-11-13 RX ORDER — FLUCONAZOLE 200 MG/1
400 TABLET ORAL DAILY
Status: DISCONTINUED | OUTPATIENT
Start: 2020-11-14 | End: 2020-12-01

## 2020-11-13 RX ORDER — INSULIN GLARGINE 100 [IU]/ML
10 INJECTION, SOLUTION SUBCUTANEOUS
Status: DISCONTINUED | OUTPATIENT
Start: 2020-11-13 | End: 2020-12-02

## 2020-11-13 RX ORDER — SODIUM CHLORIDE 9 MG/ML
INJECTION, SOLUTION INTRAVENOUS CONTINUOUS
Status: ACTIVE | OUTPATIENT
Start: 2020-11-13 | End: 2020-11-14

## 2020-11-13 RX ORDER — DIGOXIN 0.25 MG/ML
125 INJECTION INTRAMUSCULAR; INTRAVENOUS DAILY
Status: DISCONTINUED | OUTPATIENT
Start: 2020-11-14 | End: 2020-11-16

## 2020-11-13 RX ADMIN — SODIUM CHLORIDE: 9 INJECTION, SOLUTION INTRAVENOUS at 15:46

## 2020-11-13 RX ADMIN — SODIUM BICARBONATE: 84 INJECTION, SOLUTION INTRAVENOUS at 02:50

## 2020-11-13 RX ADMIN — DIGOXIN 250 MCG: 0.25 INJECTION INTRAMUSCULAR; INTRAVENOUS at 17:38

## 2020-11-13 RX ADMIN — INSULIN GLARGINE 10 UNITS: 100 INJECTION, SOLUTION SUBCUTANEOUS at 10:16

## 2020-11-13 RX ADMIN — SODIUM BICARBONATE: 84 INJECTION, SOLUTION INTRAVENOUS at 09:12

## 2020-11-13 RX ADMIN — HYDROCORTISONE SODIUM SUCCINATE 50 MG: 100 INJECTION, POWDER, FOR SOLUTION INTRAMUSCULAR; INTRAVENOUS at 05:26

## 2020-11-13 RX ADMIN — INSULIN HUMAN 7 UNITS: 100 INJECTION, SOLUTION PARENTERAL at 13:14

## 2020-11-13 RX ADMIN — SODIUM CHLORIDE, POTASSIUM CHLORIDE, SODIUM LACTATE AND CALCIUM CHLORIDE 500 ML: 600; 310; 30; 20 INJECTION, SOLUTION INTRAVENOUS at 04:02

## 2020-11-13 RX ADMIN — HEPARIN SODIUM 5000 UNITS: 5000 INJECTION, SOLUTION INTRAVENOUS; SUBCUTANEOUS at 13:19

## 2020-11-13 RX ADMIN — LINEZOLID 600 MG: 600 TABLET, FILM COATED ORAL at 05:26

## 2020-11-13 RX ADMIN — LINEZOLID 600 MG: 600 TABLET, FILM COATED ORAL at 17:34

## 2020-11-13 RX ADMIN — SODIUM CHLORIDE, POTASSIUM CHLORIDE, SODIUM LACTATE AND CALCIUM CHLORIDE 500 ML: 600; 310; 30; 20 INJECTION, SOLUTION INTRAVENOUS at 01:08

## 2020-11-13 RX ADMIN — SERTRALINE 125 MG: 100 TABLET, FILM COATED ORAL at 05:27

## 2020-11-13 RX ADMIN — CEFEPIME 1 G: 1 INJECTION, POWDER, FOR SOLUTION INTRAMUSCULAR; INTRAVENOUS at 05:25

## 2020-11-13 RX ADMIN — OLANZAPINE 7.5 MG: 5 TABLET, FILM COATED ORAL at 21:21

## 2020-11-13 RX ADMIN — METRONIDAZOLE 500 MG: 500 TABLET ORAL at 05:26

## 2020-11-13 RX ADMIN — HYDROCORTISONE SODIUM SUCCINATE 50 MG: 100 INJECTION, POWDER, FOR SOLUTION INTRAMUSCULAR; INTRAVENOUS at 17:32

## 2020-11-13 RX ADMIN — HEPARIN SODIUM 5000 UNITS: 5000 INJECTION, SOLUTION INTRAVENOUS; SUBCUTANEOUS at 21:32

## 2020-11-13 RX ADMIN — INSULIN HUMAN 4 UNITS: 100 INJECTION, SOLUTION PARENTERAL at 17:44

## 2020-11-13 RX ADMIN — HEPARIN SODIUM 5000 UNITS: 5000 INJECTION, SOLUTION INTRAVENOUS; SUBCUTANEOUS at 05:26

## 2020-11-13 RX ADMIN — FLUCONAZOLE 200 MG: 200 TABLET ORAL at 05:26

## 2020-11-13 RX ADMIN — FENTANYL CITRATE 50 MCG: 50 INJECTION, SOLUTION INTRAMUSCULAR; INTRAVENOUS at 13:19

## 2020-11-13 RX ADMIN — INSULIN HUMAN 7 UNITS: 100 INJECTION, SOLUTION PARENTERAL at 05:45

## 2020-11-13 ASSESSMENT — ENCOUNTER SYMPTOMS
ROS GI COMMENTS: STABLE
MYALGIAS: 0
VOMITING: 0
ABDOMINAL PAIN: 1
NAUSEA: 0
CHILLS: 0
COUGH: 0
TREMORS: 0
HEADACHES: 0
DIZZINESS: 0
FEVER: 0
CONSTIPATION: 0
DIARRHEA: 0
SHORTNESS OF BREATH: 0
SPUTUM PRODUCTION: 0
SENSORY CHANGE: 0

## 2020-11-13 ASSESSMENT — COGNITIVE AND FUNCTIONAL STATUS - GENERAL
DAILY ACTIVITIY SCORE: 16
MOBILITY SCORE: 7
TOILETING: A LOT
TURNING FROM BACK TO SIDE WHILE IN FLAT BAD: UNABLE
MOVING FROM LYING ON BACK TO SITTING ON SIDE OF FLAT BED: UNABLE
SUGGESTED CMS G CODE MODIFIER DAILY ACTIVITY: CK
MOVING TO AND FROM BED TO CHAIR: UNABLE
STANDING UP FROM CHAIR USING ARMS: A LOT
DRESSING REGULAR LOWER BODY CLOTHING: TOTAL
SUGGESTED CMS G CODE MODIFIER MOBILITY: CM
DRESSING REGULAR UPPER BODY CLOTHING: A LITTLE
CLIMB 3 TO 5 STEPS WITH RAILING: TOTAL
WALKING IN HOSPITAL ROOM: TOTAL
HELP NEEDED FOR BATHING: A LOT

## 2020-11-13 ASSESSMENT — PAIN DESCRIPTION - PAIN TYPE
TYPE: ACUTE PAIN
TYPE: ACUTE PAIN

## 2020-11-13 ASSESSMENT — GAIT ASSESSMENTS
ASSISTIVE DEVICE: HAND HELD ASSIST
GAIT LEVEL OF ASSIST: UNABLE TO PARTICIPATE

## 2020-11-13 NOTE — PROGRESS NOTES
Trauma / Surgical Daily Progress Note    Date of Service  11/12/2020    Chief Complaint  55 y.o. male admitted 11/3/2020 with Intraabdominal fluid collection    Interval Events  Admitted to ICU post laparotomy with segmental colon resection and diverting colostomy.  Abdominal wall cellulitis associated with intra-abdominal abscess and perforated colon.  Patient with underlying cardiomyopathy and reduced ejection fraction  Remains on vasopressors   Ongoing volume expansion   SRUTHI with hyperkalemia oliguria and azotemia, on bicarb drip ,looking at dialysis  A fib with RVR ;rate control improved with  digoxin   Very complex refractory  Resuscitation improving   Significant risk for deterioration complications and death  Critical in ICU    Review of Systems  Review of Systems   Unable to perform ROS: Mental status change        Vital Signs for last 24 hours  Temp:  [36.7 °C (98.1 °F)-37.2 °C (99 °F)] 36.9 °C (98.5 °F)  Pulse:  [] 95  Resp:  [13-65] 32  BP: ()/(54-82) 103/58  SpO2:  [87 %-99 %] 97 %    Hemodynamic parameters for last 24 hours  CVP:  [16 MM HG-292 MM HG] 21 MM HG    Respiratory Data     Respiration: (!) 32, Pulse Oximetry: 97 %        RML Breath Sounds: Clear;Diminished, RLL Breath Sounds: Clear;Diminished, LLL Breath Sounds: Clear;Diminished    Physical Exam  Physical Exam  Vitals signs and nursing note reviewed. Exam conducted with a chaperone present.   Constitutional:       General: He is in acute distress.      Appearance: He is obese. He is ill-appearing and toxic-appearing.   HENT:      Head: Normocephalic.   Eyes:      Pupils: Pupils are equal, round, and reactive to light.   Cardiovascular:      Rate and Rhythm: Regular rhythm. Tachycardia present.      Pulses: Normal pulses.      Comments: hypotensive  Pulmonary:      Effort: Pulmonary effort is normal.   Abdominal:      General: There is distension.      Tenderness: There is abdominal tenderness. There is guarding.      Comments:  Colostomy  Viable   todd bloody   Wound vac   Genitourinary:     Comments: Zabala to dd  Musculoskeletal: Normal range of motion.   Skin:     General: Skin is warm.      Capillary Refill: Capillary refill takes 2 to 3 seconds.   Neurological:      Mental Status: He is disoriented.      Cranial Nerves: No cranial nerve deficit.         Laboratory  Recent Results (from the past 24 hour(s))   ACCU-CHEK GLUCOSE    Collection Time: 11/12/20 12:46 AM   Result Value Ref Range    Glucose - Accu-Ck 273 (H) 65 - 99 mg/dL   Comp Metabolic Panel    Collection Time: 11/12/20  5:00 AM   Result Value Ref Range    Sodium 132 (L) 135 - 145 mmol/L    Potassium 5.7 (H) 3.6 - 5.5 mmol/L    Chloride 102 96 - 112 mmol/L    Co2 19 (L) 20 - 33 mmol/L    Anion Gap 11.0 7.0 - 16.0    Glucose 308 (H) 65 - 99 mg/dL    Bun 33 (H) 8 - 22 mg/dL    Creatinine 2.57 (H) 0.50 - 1.40 mg/dL    Calcium 8.3 (L) 8.5 - 10.5 mg/dL    AST(SGOT) 17 12 - 45 U/L    ALT(SGPT) 11 2 - 50 U/L    Alkaline Phosphatase 67 30 - 99 U/L    Total Bilirubin 0.5 0.1 - 1.5 mg/dL    Albumin 2.8 (L) 3.2 - 4.9 g/dL    Total Protein 5.7 (L) 6.0 - 8.2 g/dL    Globulin 2.9 1.9 - 3.5 g/dL    A-G Ratio 1.0 g/dL   CBC WITH DIFFERENTIAL    Collection Time: 11/12/20  5:00 AM   Result Value Ref Range    WBC 13.3 (H) 4.8 - 10.8 K/uL    RBC 3.66 (L) 4.70 - 6.10 M/uL    Hemoglobin 9.3 (L) 14.0 - 18.0 g/dL    Hematocrit 30.5 (L) 42.0 - 52.0 %    MCV 83.3 81.4 - 97.8 fL    MCH 25.4 (L) 27.0 - 33.0 pg    MCHC 30.5 (L) 33.7 - 35.3 g/dL    RDW 55.1 (H) 35.9 - 50.0 fL    Platelet Count 309 164 - 446 K/uL    MPV 10.0 9.0 - 12.9 fL    Neutrophils-Polys 78.50 (H) 44.00 - 72.00 %    Lymphocytes 12.40 (L) 22.00 - 41.00 %    Monocytes 7.10 0.00 - 13.40 %    Eosinophils 0.10 0.00 - 6.90 %    Basophils 0.30 0.00 - 1.80 %    Immature Granulocytes 1.60 (H) 0.00 - 0.90 %    Nucleated RBC 0.20 /100 WBC    Neutrophils (Absolute) 10.42 (H) 1.82 - 7.42 K/uL    Lymphs (Absolute) 1.64 1.00 - 4.80 K/uL    Monos  (Absolute) 0.94 (H) 0.00 - 0.85 K/uL    Eos (Absolute) 0.01 0.00 - 0.51 K/uL    Baso (Absolute) 0.04 0.00 - 0.12 K/uL    Immature Granulocytes (abs) 0.21 (H) 0.00 - 0.11 K/uL    NRBC (Absolute) 0.03 K/uL   ESTIMATED GFR    Collection Time: 11/12/20  5:00 AM   Result Value Ref Range    GFR If  31 (A) >60 mL/min/1.73 m 2    GFR If Non African American 26 (A) >60 mL/min/1.73 m 2   ACCU-CHEK GLUCOSE    Collection Time: 11/12/20  5:03 AM   Result Value Ref Range    Glucose - Accu-Ck 311 (H) 65 - 99 mg/dL   ACCU-CHEK GLUCOSE    Collection Time: 11/12/20  8:59 AM   Result Value Ref Range    Glucose - Accu-Ck 260 (H) 65 - 99 mg/dL   ACCU-CHEK GLUCOSE    Collection Time: 11/12/20 12:04 PM   Result Value Ref Range    Glucose - Accu-Ck 235 (H) 65 - 99 mg/dL   ACCU-CHEK GLUCOSE    Collection Time: 11/12/20  5:35 PM   Result Value Ref Range    Glucose - Accu-Ck 240 (H) 65 - 99 mg/dL       Fluids    Intake/Output Summary (Last 24 hours) at 11/12/2020 1812  Last data filed at 11/12/2020 1600  Gross per 24 hour   Intake 7447.4 ml   Output 1090 ml   Net 6357.4 ml       Core Measures & Quality Metrics  Labs reviewed, Medications reviewed and Radiology images reviewed  Zabala catheter: Critically Ill - Requiring Accurate Measurement of Urinary Output  Central line in place: Need for access    DVT Prophylaxis: Contraindicated - High bleeding risk  DVT prophylaxis - mechanical: SCDs  Ulcer prophylaxis: Yes  Antibiotics: Treating active infection/contamination beyond 24 hours perioperative coverage      LEONOR Score    ETOH Screening      Assessment/Plan  Adrenal insufficiency (HCC)  Assessment & Plan  Cortisol 17 in stress state  Hydrocortisone started    Spontaneous perforation of colon (HCC)  Assessment & Plan  Admitted with LUQ abscess and suspicion of perf at splenic flexure  11/9 Drain with stool and unable to hold suction  11/10 Segmental colectomy, colostomy, ventura pouch  I and D of abdominal wall abscess  Wound  VAc  LUQ drain  trending cultures   Kin Desouza DO General Surgery    Sepsis ([facility])  Assessment & Plan  Septic shock post op   Vasopressors and fluids   11/12 weaned off vasopressors     Bacteremia  Assessment & Plan  Likely due to untreated colon perforation    Paroxysmal A-fib with RVR ([facility])- (present on admission)  Assessment & Plan  Recurrent    Amiodarone initiated   RVR improving ,   Added digoxin   11/12 stopped amiodarone     SRUTHI (acute kidney injury) ([facility])  Assessment & Plan  Increasing azotemia   Relative oliguria   Hyperkalemia requiring GIK and kayexalte,   [place]3 infusion   Continue volume expansion , avoid nephrotoxiins , nephrology consult     LV dysfunction  Assessment & Plan  BP low after abscess drainage  Echo ordered    Longstanding persistent atrial fibrillation ([facility])  Assessment & Plan  Cardiology OK with high rates as long as HD stable  But on vasopressors with rate 160s  Amiodarone started, as pt on levophed  11/12 rate controlled with digoxin       Discussed patient condition with RN, RT, Pharmacy and Patient.  CRITICAL CARE TIME EXCLUDING PROCEDURES: 55    Minutes  I independently reviewed pertinent clinical lab tests from the last 48 hours and ordered additional follow up clinical lab tests.  I independently reviewed pertinent radiographic images and the radiologist's reports from the last 48 hours and ordered additional follow up radiographic studies.  I reviewed the details of the available patient records and documentation by consulting physicians in [facility] up to today, summated the information, and utilized the information as warranted in today's medical decision making for this patient.  I personally evaluated the patient condition at bedside and discussed the daily plan(s) with available nursing staff, dieticians, social workers, pharmacists on rounds.  The patient is critically ill with septic shock, SIRS, multisystem organ failure, acute kidney injury and atrial  tachyarrhythmias.  The patient was seen and examined on rounds and discussed with the multidisciplinary critical care team and consulting physicians. Critically evaluated laboratory tests, culture data, medications, imaging, and other diagnostic tests.    The patient has impairment of one or more vital organ systems and a high probability of imminent or life-threatening deterioration in condition. Provided high complexity decision making to assess, manipulate, and support vital system functions to treat vital organ system failure and/or to prevent further life-threatening deterioration of the patient's condition. Requires continued ICU and hospital admission.    Critical care interventions include: integration of multiple data points and associated complex medical decision making, titration of vasopressors, evaluation and direction of antimicrobial therapy for life threatening infection, sepsis resuscitation, management of acute kidney injury and management of critical electrolyte abnormalities.    .

## 2020-11-13 NOTE — THERAPY
Physical Therapy   Daily Treatment     Patient Name: Sebastián Castro  Age:  55 y.o., Sex:  male  Medical Record #: [id number]  Today's Date: 11/13/2020     Precautions: Fall Risk    Assessment    Pt motivated to participate. Focus on improved sitting posture as per discussion with RN on difficulties mobilizing with staff. Pt tends to lean posterior and extend hips resulting in high fall risk. Extensive training at [facility] on weight shifting forward, using head to come over knees. Pt fearful initially but then using UEs to pull forward able to perform 3x5. STS transfer training completed. As with sitting, pt attempts to posteriorly weight shift and then lever up from bed in this fashion. Educated him this poses a high fall risk and high likelihood his feet will sleep even with non-skid socks. Focus on anterior weight shift and head/hip relationship (anterior head to achieve glute clearance). Pt continued to demonstrate posterior push even with tactile and verbal cues provided. Needed blocking of R knee in order to stand. Once standing no buckling but was unable to weight shift to ambulate. Returned to supine. Acute PT to continue to follow. Continue to recommend placement.    Plan    Continue current treatment plan.    DC Equipment Recommendations: Unable to determine at this time  Discharge Recommendations: Recommend post-acute placement for additional physical therapy services prior to discharge home           Objective       11/13/20 0845   Cognition    Level of Consciousness Alert   Neuro-Muscular Treatments   Neuro-Muscular Treatments Anterior weight shift   Comments Pt tends to lean posterior and extend hips resulting in high fall risk. Extensive training at [facility] on weight shifting forward, using head to come over knees. Pt fearful initially but then using UEs to pull forward able to perform 3x5   Balance   Sitting Balance (Static) Fair -   Sitting Balance (Dynamic) Poor +   Standing Balance (Static) Trace +   Standing  Balance (Dynamic) Trace   Weight Shift Sitting Fair   Weight Shift Standing Poor   Comments HHA when standing   Gait Analysis   Gait Level Of Assist Unable to Participate   Assistive Device Hand Held Assist   Comments Attempted to take lateral side steps, unable to do so   Bed Mobility    Supine to Sit Minimal Assist   Sit to Supine Maximal Assist   Scooting Minimal Assist   Functional Mobility   Sit to Stand Moderate Assist   Short Term Goals    Short Term Goal # 1 Pt to move supine to/from eob, no rails, w/ spv in 6 visits to improve fxl indep   Goal Outcome # 1 goal not met   Short Term Goal # 2 Pt to move sit to/from stand w/ spv in 6 visits w/ spv in 6 visits to improve fxl indep   Goal Outcome # 2 Goal not met   Short Term Goal # 3 Pt to ambulate 150 ft w/ spv in 6 visits to improve fxl indep   Goal Outcome # 3 Goal not met

## 2020-11-13 NOTE — PROGRESS NOTES
"Progress Note               Author: Sarah Cruz M.D. Date & Time created: 11/13/2020  11:35 AM     Interval History:  \"I feel ok.\" Got up to standing this morning. No N/V. Would like to eat.     Review of Systems:  Review of Systems   Constitutional: Negative for chills and fever.   Gastrointestinal: Positive for abdominal pain. Negative for nausea and vomiting.       Physical Exam:  Physical Exam  Constitutional:       Appearance: Normal appearance. He is obese. He is not diaphoretic.   Eyes:      Extraocular Movements: Extraocular movements intact.   Neck:      Musculoskeletal: Normal range of motion.   Cardiovascular:      Rate and Rhythm: Regular rhythm. Tachycardia present.      Pulses: Normal pulses.   Pulmonary:      Effort: Pulmonary effort is normal.   Abdominal:      General: There is distension.      Tenderness: There is abdominal tenderness.      Comments: Appropriate incisional tenderness. Ostomy pink. Stool in ostomy bag. BETTE serosang, clear.   Skin:     General: Skin is warm and dry.      Capillary Refill: Capillary refill takes less than 2 seconds.   Neurological:      Mental Status: He is alert.         Labs:          Recent Labs     11/11/20  1635 11/12/20 0500 11/13/20  0545   SODIUM 130* 132* 131*   POTASSIUM 6.7* 5.7* 5.1   CHLORIDE 101 102 98   CO2 14* 19* 23   BUN 32* 33* 39*   CREATININE 2.42* 2.57* 2.84*   CALCIUM 8.8 8.3* 8.1*     Recent Labs     11/11/20  0500 11/11/20  0500 11/11/20  1635 11/12/20  0500 11/13/20  0545   ALTSGPT 13  --   --  11 7   ASTSGOT 25  --   --  17 17   ALKPHOSPHAT 95  --   --  67 63   TBILIRUBIN 0.5  --   --  0.5 0.4   GLUCOSE 159*   < > 198* 308* 270*    < > = values in this interval not displayed.     Recent Labs     11/11/20  0500 11/12/20  0500 11/13/20  0545   RBC 4.92 3.66* 3.96*   HEMOGLOBIN 12.5* 9.3* 10.3*   HEMATOCRIT 42.2 30.5* 32.9*   PLATELETCT 504* 309 281     Recent Labs     11/11/20  0500 11/12/20  0500 11/13/20  0545   WBC 15.4* 13.3* " 13.8*   NEUTSPOLYS 85.20* 78.50* 81.90*   LYMPHOCYTES 6.20* 12.40* 10.00*   MONOCYTES 5.50 7.10 6.00   EOSINOPHILS 0.50 0.10 0.00   BASOPHILS 0.50 0.30 0.10   ASTSGOT 25 17 17   ALTSGPT 13 11 7   ALKPHOSPHAT 95 67 63   TBILIRUBIN 0.5 0.5 0.4     Hemodynamics:  Temp (24hrs), Av.8 °C (98.2 °F), Min:36.1 °C (97 °F), Max:37.1 °C (98.8 °F)  Temperature: 36.1 °C (97 °F)  Pulse  Av.5  Min: 51  Max: 161   Blood Pressure: 119/72  CVP (mm Hg): (!) 319 MM HG  Respiratory:    Respiration: 16, Pulse Oximetry: 100 %        RML Breath Sounds: Diminished, RLL Breath Sounds: Diminished, LLL Breath Sounds: Diminished  Fluids:    Intake/Output Summary (Last 24 hours) at 2020 1135  Last data filed at 2020 0800  Gross per 24 hour   Intake 7460 ml   Output 1547 ml   Net 5913 ml       GI/Nutrition:  Orders Placed This Encounter   Procedures   • Diet Order Diet: Clear Liquid     Standing Status:   Standing     Number of Occurrences:   1     Order Specific Question:   Diet:     Answer:   Clear Liquid [10]     Medical Decision Making, by Problem:  Active Hospital Problems    Diagnosis   • Spontaneous perforation of colon (HCC) [K63.1]   • Adrenal insufficiency (HCC) [E27.40]   • Sepsis (HCC) [A41.9]   • Bacteremia [R78.81]   • Paroxysmal A-fib with RVR (HCC) [I48.0]   • SRUTHI (acute kidney injury) (HCC) [N17.9]   • Longstanding persistent atrial fibrillation (HCC) [I48.11]   • LV dysfunction [I51.9]       Plan:  POD 3 Interiano's for perforation of sigmoid flexure with significant intra-abdominal contamination and abdominal wall abscess.     Large serous fluid from drain - continue drain.   Wound vac change tomorrow.   Ostomy functioning, ok to start clear liquids.   Encouraged ambulation and out of bed.   SRUTHI with metabolic derangement.   ID following for abscess.   Appreciate critical care team assistance.     Quality-Core Measures   KORI Dutta

## 2020-11-13 NOTE — PROGRESS NOTES
Nephrology Daily Progress Note    Date of Service  11/13/2020    Chief Complaint  55 y.o. male admitted 11/3/2020 with sepsis, perforated colon, developed SRUTHI    Interval Problem Update  Pt is doing better today, no CP, no SOB, UO is increasing slowly    Review of Systems  Review of Systems   Constitutional: Positive for malaise/fatigue. Negative for chills and fever.   Respiratory: Negative for cough and shortness of breath.    Cardiovascular: Negative for chest pain and leg swelling.   Gastrointestinal: Negative for nausea and vomiting.   Genitourinary: Negative for dysuria, frequency and urgency.   All other systems reviewed and are negative.       Physical Exam  Temp:  [36.1 °C (97 °F)-37.1 °C (98.8 °F)] 36.1 °C (97 °F)  Pulse:  [] 106  Resp:  [4-35] 12  BP: (103-146)/(58-93) 130/75  SpO2:  [90 %-100 %] 98 %    Physical Exam  Vitals signs and nursing note reviewed.   Constitutional:       General: He is awake.      Appearance: He is well-developed. He is ill-appearing.   HENT:      Head: Normocephalic and atraumatic.      Right Ear: External ear normal.      Left Ear: External ear normal.      Nose: Nose normal.      Mouth/Throat:      Pharynx: No oropharyngeal exudate or posterior oropharyngeal erythema.   Eyes:      General:         Right eye: No discharge.         Left eye: No discharge.      Conjunctiva/sclera: Conjunctivae normal.   Neck:      Musculoskeletal: No neck rigidity or muscular tenderness.   Cardiovascular:      Rate and Rhythm: Normal rate and regular rhythm.   Pulmonary:      Effort: Pulmonary effort is normal. No respiratory distress.      Breath sounds: Normal breath sounds. No wheezing.      Comments: Coarse BS  Abdominal:      General: Abdomen is flat. Bowel sounds are normal.   Musculoskeletal:      Right lower leg: Edema present.      Left lower leg: Edema present.   Skin:     General: Skin is warm and dry.      Coloration: Skin is not jaundiced.   Neurological:      General: No  focal deficit present.      Mental Status: He is alert and oriented to person, place, and time. Mental status is at baseline.   Psychiatric:         Mood and Affect: Mood normal.         Behavior: Behavior normal.         Thought Content: Thought content normal.         Fluids    Intake/Output Summary (Last 24 hours) at 11/13/2020 1458  Last data filed at 11/13/2020 1200  Gross per 24 hour   Intake 6960 ml   Output 1230 ml   Net 5730 ml       Laboratory  Recent Labs     11/11/20  0500 11/12/20  0500 11/13/20  0545   WBC 15.4* 13.3* 13.8*   RBC 4.92 3.66* 3.96*   HEMOGLOBIN 12.5* 9.3* 10.3*   HEMATOCRIT 42.2 30.5* 32.9*   MCV 85.8 83.3 83.1   MCH 25.4* 25.4* 26.0*   MCHC 29.6* 30.5* 31.3*   RDW 57.1* 55.1* 55.1*   PLATELETCT 504* 309 281   MPV 10.1 10.0 9.9     Recent Labs     11/11/20  1635 11/12/20  0500 11/13/20  0545   SODIUM 130* 132* 131*   POTASSIUM 6.7* 5.7* 5.1   CHLORIDE 101 102 98   CO2 14* 19* 23   GLUCOSE 198* 308* 270*   BUN 32* 33* 39*   CREATININE 2.42* 2.57* 2.84*   CALCIUM 8.8 8.3* 8.1*         No results for input(s): NTPROBNP in the last 72 hours.        Imaging  US-RENAL   Final Result    Examination was limited due to poor sound penetration related to body habitus.   Grossly normal appearance of the kidneys.      EC-ECHOCARDIOGRAM COMPLETE W/ CONT   Final Result      DX-CHEST-PORTABLE (1 VIEW)   Final Result      No pneumothorax identified following right subclavian line placement      Worsening atelectasis with consolidation at the bases not excluded      Stable cardiac silhouette enlargement      CT-ABDOMEN-PELVIS WITH   Final Result      Interval decrease in size of perisplenic abscess. Pigtail catheter now in place      Abscess has likely fistulous communication with the abutting splenic flexure of the colon. Colon is otherwise normal in appearance      Mild hepatosplenomegaly      Decreasing small left pleural effusion      Subacute anterior wedge compression fractures in the caudal  thoracic spine      CT-DRAIN-PERITONEAL   Final Result      1.  CT guided perisplenic abscess catheter drainage.   2.  The current plan is to obtain a follow-up CT in 5-7 days..      OUTSIDE IMAGES-CT CHEST   Final Result      OUTSIDE IMAGES-CT ABDOMEN /PELVIS   Final Result      CT-ABDOMEN-PELVIS WITH   Final Result   Addendum 1 of 1   Addendum:   A prior noncontrast CT of the abdomen and pelvis from [facility] dated 10/19/2020 was made available for comparison. On the    prior study a perisplenic/subcapsular fluid collection was seen however    did not contain air at that time and    there was no discrete evidence of communication with the transverse colon.    Communication with splenic flexure of the colon and air within the    collection are new from the prior study.      Final      DX-CHEST-PORTABLE (1 VIEW)   Final Result      Slight interval interval improvement in congestive heart failure.            Assessment/Plan  1 SRUTHI: sec to RICARDO/ATN, oliguric  2 Hyperkalemia  3 Metabolic acidosis  4 Anemia  Plan  no acute need for HD, evaluate daily  Change IVF to NS  Renal diet  Daily labs  Renal dose all meds  Avoid nephrotoxins  Prognosis guarded.

## 2020-11-13 NOTE — CARE PLAN
Problem: Nutritional:  Goal: Achieve adequate nutritional intake  Description: Advance diet as tolerated past clear liquids. Patient will consume >50% of meals.  Outcome: PROGRESSING SLOWER THAN EXPECTED    Day 4 NPO/clear liquids

## 2020-11-13 NOTE — PROGRESS NOTES
Trauma / Surgical Daily Progress Note    Date of Service  11/13/2020    Chief Complaint  55 y.o. male admitted 11/3/2020 with Intraabdominal fluid collection    Interval Events  Admitted to ICU post laparotomy with segmental colon resection and diverting colostomy.  Abdominal wall cellulitis associated with intra-abdominal abscess and perforated colon.  Patient with underlying cardiomyopathy and reduced ejection fraction  Weaned from vasopressors    Ongoing volume expansion , Bicarb solution  SRUTHI with hyperkalemia oliguria and azotemia, on bicarb drip ,Nephrology following  A fib with RVR ;rate control improved with  digoxin   Very complex refractory  Resuscitation improving , remains oliguric   Checking drain for Body fluid amylase   Significant risk for deterioration complications and death  Critical in ICU    Review of Systems  Review of Systems   Unable to perform ROS: Mental status change        Vital Signs for last 24 hours  Temp:  [36.1 °C (97 °F)-37.1 °C (98.8 °F)] 36.1 °C (97 °F)  Pulse:  [] 106  Resp:  [4-35] 12  BP: (103-146)/(58-93) 130/75  SpO2:  [90 %-100 %] 98 %    Hemodynamic parameters for last 24 hours  CVP:  [19 MM HG-320 MM HG] 84 MM HG    Respiratory Data     Respiration: 12, Pulse Oximetry: 98 %        RML Breath Sounds: Diminished, RLL Breath Sounds: Diminished, LLL Breath Sounds: Diminished    Physical Exam  Physical Exam  Vitals signs and nursing note reviewed. Exam conducted with a chaperone present.   Constitutional:       General: He is in acute distress.      Appearance: He is obese. He is ill-appearing and toxic-appearing.   HENT:      Head: Normocephalic.   Eyes:      Pupils: Pupils are equal, round, and reactive to light.   Cardiovascular:      Rate and Rhythm: Regular rhythm. Tachycardia present.      Pulses: Normal pulses.      Comments: hypotensive  Pulmonary:      Effort: Pulmonary effort is normal.   Abdominal:      General: There is distension.      Tenderness: There is  abdominal tenderness. There is guarding.      Comments: Colostomy  Viable   todd bloody   Wound vac   Genitourinary:     Comments: Zabala to dd  Musculoskeletal: Normal range of motion.   Skin:     General: Skin is warm.      Capillary Refill: Capillary refill takes 2 to 3 seconds.   Neurological:      Mental Status: He is disoriented.      Cranial Nerves: No cranial nerve deficit.         Laboratory  Recent Results (from the past 24 hour(s))   ACCU-CHEK GLUCOSE    Collection Time: 11/12/20  5:35 PM   Result Value Ref Range    Glucose - Accu-Ck 240 (H) 65 - 99 mg/dL   ACCU-CHEK GLUCOSE    Collection Time: 11/12/20 11:35 PM   Result Value Ref Range    Glucose - Accu-Ck 266 (H) 65 - 99 mg/dL   ACCU-CHEK GLUCOSE    Collection Time: 11/13/20  5:44 AM   Result Value Ref Range    Glucose - Accu-Ck 258 (H) 65 - 99 mg/dL   Comp Metabolic Panel    Collection Time: 11/13/20  5:45 AM   Result Value Ref Range    Sodium 131 (L) 135 - 145 mmol/L    Potassium 5.1 3.6 - 5.5 mmol/L    Chloride 98 96 - 112 mmol/L    Co2 23 20 - 33 mmol/L    Anion Gap 10.0 7.0 - 16.0    Glucose 270 (H) 65 - 99 mg/dL    Bun 39 (H) 8 - 22 mg/dL    Creatinine 2.84 (H) 0.50 - 1.40 mg/dL    Calcium 8.1 (L) 8.5 - 10.5 mg/dL    AST(SGOT) 17 12 - 45 U/L    ALT(SGPT) 7 2 - 50 U/L    Alkaline Phosphatase 63 30 - 99 U/L    Total Bilirubin 0.4 0.1 - 1.5 mg/dL    Albumin 2.9 (L) 3.2 - 4.9 g/dL    Total Protein 5.8 (L) 6.0 - 8.2 g/dL    Globulin 2.9 1.9 - 3.5 g/dL    A-G Ratio 1.0 g/dL   CBC WITH DIFFERENTIAL    Collection Time: 11/13/20  5:45 AM   Result Value Ref Range    WBC 13.8 (H) 4.8 - 10.8 K/uL    RBC 3.96 (L) 4.70 - 6.10 M/uL    Hemoglobin 10.3 (L) 14.0 - 18.0 g/dL    Hematocrit 32.9 (L) 42.0 - 52.0 %    MCV 83.1 81.4 - 97.8 fL    MCH 26.0 (L) 27.0 - 33.0 pg    MCHC 31.3 (L) 33.7 - 35.3 g/dL    RDW 55.1 (H) 35.9 - 50.0 fL    Platelet Count 281 164 - 446 K/uL    MPV 9.9 9.0 - 12.9 fL    Neutrophils-Polys 81.90 (H) 44.00 - 72.00 %    Lymphocytes 10.00 (L) 22.00  - 41.00 %    Monocytes 6.00 0.00 - 13.40 %    Eosinophils 0.00 0.00 - 6.90 %    Basophils 0.10 0.00 - 1.80 %    Immature Granulocytes 2.00 (H) 0.00 - 0.90 %    Nucleated RBC 0.50 /100 WBC    Neutrophils (Absolute) 11.27 (H) 1.82 - 7.42 K/uL    Lymphs (Absolute) 1.37 1.00 - 4.80 K/uL    Monos (Absolute) 0.82 0.00 - 0.85 K/uL    Eos (Absolute) 0.00 0.00 - 0.51 K/uL    Baso (Absolute) 0.02 0.00 - 0.12 K/uL    Immature Granulocytes (abs) 0.28 (H) 0.00 - 0.11 K/uL    NRBC (Absolute) 0.07 K/uL   ESTIMATED GFR    Collection Time: 11/13/20  5:45 AM   Result Value Ref Range    GFR If  28 (A) >60 mL/min/1.73 m 2    GFR If Non African American 23 (A) >60 mL/min/1.73 m 2   FLUID AMYLASE    Collection Time: 11/13/20  8:55 AM   Result Value Ref Range    Fluid Type BETTE Drainage     Body Fluid Amylase 18 U/L   ACCU-CHEK GLUCOSE    Collection Time: 11/13/20  1:12 PM   Result Value Ref Range    Glucose - Accu-Ck 293 (H) 65 - 99 mg/dL       Fluids    Intake/Output Summary (Last 24 hours) at 11/13/2020 1448  Last data filed at 11/13/2020 1200  Gross per 24 hour   Intake 6960 ml   Output 1230 ml   Net 5730 ml       Core Measures & Quality Metrics  Labs reviewed, Medications reviewed and Radiology images reviewed  Zabala catheter: Critically Ill - Requiring Accurate Measurement of Urinary Output  Central line in place: Need for access    DVT Prophylaxis: Contraindicated - High bleeding risk  DVT prophylaxis - mechanical: SCDs  Ulcer prophylaxis: Yes  Antibiotics: Treating active infection/contamination beyond 24 hours perioperative coverage      LEONOR Score    ETOH Screening      Assessment/Plan  Adrenal insufficiency (HCC)  Assessment & Plan  Cortisol 17 in stress state  Hydrocortisone   11/13  Weaned to 50 BID    Spontaneous perforation of colon (HCC)  Assessment & Plan  Admitted with LUQ abscess and suspicion of perf at splenic flexure  11/9 Drain with stool and unable to hold suction  11/10 Segmental colectomy,  colostomy, ventura pouch  I and D of abdominal wall abscess  Wound VAc  LUQ drain  trending cultures   Ileus resolved CLD 11/13   BETTE drain  Serous , checking for body fluid amylase   Kin Desouza DO General Surgery    Sepsis ([facility])  Assessment & Plan  Septic shock post op   Vasopressors and fluids   11/12 weaned off vasopressors     Bacteremia  Assessment & Plan  Likely due to untreated colon perforation    Paroxysmal A-fib with RVR ([facility])- (present on admission)  Assessment & Plan  Recurrent    Amiodarone initiated   RVR improving ,   Added digoxin   11/12 stopped amiodarone   11/13 rate control acceptable     SRUTHI (acute kidney injury) ([facility])  Assessment & Plan  Increasing azotemia   Relative oliguria   Hyperkalemia requiring GIK and kayexalte,   [place] infusion   Continue volume expansion , avoid nephrotoxiins , nephrology consult     LV dysfunction  Assessment & Plan  BP low after abscess drainage  Echo ordered    Longstanding persistent atrial fibrillation ([facility])  Assessment & Plan  Cardiology OK with high rates as long as HD stable  But on vasopressors with rate 160s  Amiodarone started, as pt on levophed  11/12 rate controlled with digoxin       Discussed patient condition with RN, RT, Pharmacy and Patient.  CRITICAL CARE TIME EXCLUDING PROCEDURES: 55    Minutes  I independently reviewed pertinent clinical lab tests from the last 48 hours and ordered additional follow up clinical lab tests.  I independently reviewed pertinent radiographic images and the radiologist's reports from the last 48 hours and ordered additional follow up radiographic studies.  I reviewed the details of the available patient records and documentation by consulting physicians in [facility] up to today, summated the information, and utilized the information as warranted in today's medical decision making for this patient.  I personally evaluated the patient condition at bedside and discussed the daily plan(s) with available nursing staff,  dieticians, social workers, pharmacists on rounds.  The patient is critically ill with septic shock, SIRS, multisystem organ failure, acute kidney injury and atrial tachyarrhythmias.  The patient was seen and examined on rounds and discussed with the multidisciplinary critical care team and consulting physicians. Critically evaluated laboratory tests, culture data, medications, imaging, and other diagnostic tests.    The patient has impairment of one or more vital organ systems and a high probability of imminent or life-threatening deterioration in condition. Provided high complexity decision making to assess, manipulate, and support vital system functions to treat vital organ system failure and/or to prevent further life-threatening deterioration of the patient's condition. Requires continued ICU and hospital admission.    Critical care interventions include: integration of multiple data points and associated complex medical decision making, titration of vasopressors, evaluation and direction of antimicrobial therapy for life threatening infection, sepsis resuscitation, management of acute kidney injury and management of critical electrolyte abnormalities.    .

## 2020-11-13 NOTE — THERAPY
Occupational Therapy  Daily Treatment     Patient Name: Sebastián Castro  Age:  55 y.o., Sex:  male  Medical Record #: [id number]  Today's Date: 11/13/2020     Precautions  Precautions: Fall Risk  Comments: BETTE drains, wound VAC    Assessment     Pt currently limited by decreased functional mobility, activity tolerance, cognition, strength, balance, and pain which are affecting pt's ability to complete ADLs/IADLs at baseline. Pt would benefit from OT services in the acute care setting to maximize functional recovery.     Plan    Continue current treatment plan.    DC Equipment Recommendations: Unable to determine at this time  Discharge Recommendations: (P) Recommend post-acute placement for additional occupational therapy services prior to discharge home       11/13/20 0838   Activities of Daily Living   Grooming Minimal Assist   Upper Body Dressing Minimal Assist   Lower Body Dressing Total Assist   Toileting Total Assist   Functional Mobility   Sit to Stand Moderate Assist   Bed, Chair, Wheelchair Transfer Unable to Participate   Short Term Goals   Short Term Goal # 1 Pt will sit EOB & groom wtih supervision   Goal Outcome # 1 Progressing as expected   Short Term Goal # 2 Pt will dress LB with AE & Min A   Goal Outcome # 2 Goal not met   Short Term Goal # 3 Pt will be Mod A with BSC transfers   Goal Outcome # 3 Goal not met

## 2020-11-13 NOTE — PROGRESS NOTES
Infectious Disease Progress Note    Author: Jane Anderson M.D. Date & Time of service: 11/13/2020  8:02 AM    Chief Complaint:  Intrabdominal abscess and bacteremia    Interval History:   55 y.o. diabetic male admitted 11/3/2020.+ A. fib, CHD, and severe morbid obesity.  Seen by ID in the past for right foot abscess with cultures positive for MRSA and Proteus.  s/p GLF approximately 1 month ago and was admitted to [facility] with left upper quadrant pain.  CT at the time revealed a subcapsular splenic hematoma. Presented to ED complaining of abdominal pain described as 4/5 out of 10 dull and constant located in the left upper quadrant.    11/9 AF WBC 9.4 Less pain in side but remains tender to touch. Denies SE Zyvox-counseled about sxs serotonin syndrome  11/11 T-max 99.7 WBC 15.4 patient underwent colectomy, colostomy placement and I&D of flank wound with wound VAC placement on 11/10/2020 by Dr. Desouza.  Postop course complicated by hypotension and transferred to the ICU.  On pressors.  Patient complaining of shortness of breath as well as lightheadedness.  Also has postop abdominal pain.  11/12 afebrile WBC 13.3.  Patient weaned off pressors overnight.  Operative cultures growing group D Enterococcus and yeast.  Patient feeling better today.  Improved shortness of breath.  Abdominal pain stable  11/13 afebrile WBC 13.8 patient continues to feel better.  He is inquiring when he can have food by mouth.    Review of Systems:  Review of Systems   Constitutional: Positive for malaise/fatigue. Negative for chills and fever.   Respiratory: Negative for cough, sputum production and shortness of breath.    Gastrointestinal: Positive for abdominal pain. Negative for constipation, diarrhea, nausea and vomiting.        Stable   Musculoskeletal: Negative for myalgias.   Neurological: Negative for dizziness, tremors, sensory change and headaches.   All other systems reviewed and are negative.      Hemodynamics:  Temp  (24hrs), Av.8 °C (98.2 °F), Min:36.1 °C (97 °F), Max:37.1 °C (98.8 °F)  Temperature: 36.1 °C (97 °F)  Pulse  Av.6  Min: 51  Max: 161   Blood Pressure: 122/81  CVP (mm Hg): (!) 320 MM HG    Physical Exam:  Physical Exam  Vitals signs and nursing note reviewed.   Constitutional:       General: He is not in acute distress.     Appearance: He is obese. He is ill-appearing. He is not diaphoretic.   HENT:      Nose: No rhinorrhea.      Mouth/Throat:      Pharynx: No oropharyngeal exudate.   Eyes:      General: No scleral icterus.     Extraocular Movements: Extraocular movements intact.      Pupils: Pupils are equal, round, and reactive to light.   Cardiovascular:      Rate and Rhythm: Tachycardia present. Rhythm irregular.   Pulmonary:      Effort: No respiratory distress.      Comments: Decreased BS bilaterally    Right upper chest tunneled catheter-nontender, no surrounding erythema  Abdominal:      General: There is no distension.      Palpations: Abdomen is soft.      Tenderness: There is abdominal tenderness. There is no guarding.      Comments: Midline abdominal surgical site with wound VAC in place, colostomy bag.  Left lower quadrant drain with serosanguineous fluid in bulb   Musculoskeletal:      Right lower leg: Edema present.      Left lower leg: Edema present.      Comments: Evidence of prior toe amputations.  Sites clean   Skin:     General: Skin is warm.      Coloration: Skin is not jaundiced.      Findings: Bruising and erythema present.   Neurological:      General: No focal deficit present.      Mental Status: He is alert and oriented to person, place, and time.   Psychiatric:         Mood and Affect: Mood normal.         Behavior: Behavior normal.         Meds:    Current Facility-Administered Medications:   •  heparin  •  insulin regular **AND** POC Blood Glucose **AND** NOTIFY MD and PharmD **AND** glucose **AND** dextrose 50%  •  fluconazole  •  metroNIDAZOLE  •  digoxin  •  sodium  bicarbonate 150 meq in D5W 1000 mL  •  norepinephrine (Levophed) infusion  •  cefepime (MAXIPIME) IVPB  •  albuterol  •  fentaNYL  •  LR  •  hydrocortisone sodium succinate PF  •  vasopressin (PITRESSIN) infusion  •  ondansetron  •  linezolid  •  ondansetron  •  OLANZapine  •  sertraline    Labs:  Recent Labs     11/11/20  0500 11/12/20  0500 11/13/20  0545   WBC 15.4* 13.3* 13.8*   RBC 4.92 3.66* 3.96*   HEMOGLOBIN 12.5* 9.3* 10.3*   HEMATOCRIT 42.2 30.5* 32.9*   MCV 85.8 83.3 83.1   MCH 25.4* 25.4* 26.0*   RDW 57.1* 55.1* 55.1*   PLATELETCT 504* 309 281   MPV 10.1 10.0 9.9   NEUTSPOLYS 85.20* 78.50* 81.90*   LYMPHOCYTES 6.20* 12.40* 10.00*   MONOCYTES 5.50 7.10 6.00   EOSINOPHILS 0.50 0.10 0.00   BASOPHILS 0.50 0.30 0.10   RBCMORPHOLO Present  --   --      Recent Labs     11/11/20  1635 11/12/20  0500 11/13/20  0545   SODIUM 130* 132* 131*   POTASSIUM 6.7* 5.7* 5.1   CHLORIDE 101 102 98   CO2 14* 19* 23   GLUCOSE 198* 308* 270*   BUN 32* 33* 39*     Recent Labs     11/11/20  0500 11/11/20  0500 11/11/20  1635 11/12/20  0500 11/13/20  0545   ALBUMIN 2.2*  --   --  2.8* 2.9*   TBILIRUBIN 0.5  --   --  0.5 0.4   ALKPHOSPHAT 95  --   --  67 63   TOTPROTEIN 6.3  --   --  5.7* 5.8*   ALTSGPT 13  --   --  11 7   ASTSGOT 25  --   --  17 17   CREATININE 2.19*   < > 2.42* 2.57* 2.84*    < > = values in this interval not displayed.       Imaging:  Ct-abdomen-pelvis With    Addendum Date: 11/5/2020    Addendum: A prior noncontrast CT of the abdomen and pelvis from [facility] dated 10/19/2020 was made available for comparison. On the prior study a perisplenic/subcapsular fluid collection was seen however did not contain air at that time and there was no discrete evidence of communication with the transverse colon. Communication with splenic flexure of the colon and air within the collection are new from the prior study.    Result Date: 11/5/2020  11/3/2020 4:02 PM HISTORY/REASON FOR EXAM:  Abd pain,  unspecified; IV contrast only. TECHNIQUE/EXAM DESCRIPTION:   CT scan of the abdomen and pelvis with contrast. Contrast-enhanced helical scanning was obtained from the diaphragmatic domes through the pubic symphysis following the bolus administration of nonionic contrast without complication. 100 mL of Omnipaque 350 nonionic contrast was administered without complication. Low dose optimization technique was utilized for this CT exam including automated exposure control and adjustment of the mA and/or kV according to patient size. COMPARISON: 9/26/2018 FINDINGS: Chest Base: Small left pleural effusion with mild bibasilar atelectasis. Heart is mildly enlarged. Liver:  Diffuse hypoattenuation of the liver suggesting fatty infiltration. Moderately enlarged. Gallbladder: Cholelithiasis. Biliary tract: Nondilated. Pancreas: Normal. Spleen: There is a rim-enhancing perisplenic fluid collection which contains air and which communicates with the splenic flexure of the colon. This most likely represents an abscess and measures about 8.7 x 7.9 cm. The anterior aspect of the spleen is nonenhancing with irregular margins could be related to infection of splenic parenchyma or infarction. Adrenals: Normal. Kidneys and Collecting Systems:  There is mild right renal cortical scarring. No hydronephrosis. No renal mass. Gastrointestinal tract:  No bowel obstruction. The appendix is normal. Peritoneum: No free air or free fluid. Reproductive organs:  Normal. Bladder:  Normal. Vessels:  Normal caliber. Lymph  Nodes:  No lymphadenopathy. Abdominal wall: Within normal limits. Bones:  Age indeterminate possibly subacute to chronic T11 and T12 superior endplate compression fractures.     1.  Peripherally enhancing perisplenic fluid collection which communicates with the splenic flexure of the colon and which contains foci of air is most consistent with an abscess/infected fluid collection. There is nonenhancement of the anterior aspect of  the spleen suggesting infection/infarction. Surgical consultation is recommended. 2.  Hepatomegaly and hepatic steatosis. 3.  Cholelithiasis. 4.  Right renal cortical scarring. No hydronephrosis. 5.  Small left pleural effusion. Mild bibasilar atelectasis. 6.  Subacute to chronic appearing T11 and T12 superior endplate compression fractures. These findings were discussed with SVETLANA PAREDES on 11/3/2020 4:49 PM.     Ct-drain-peritoneal    Result Date: 11/4/2020 11/4/2020 5:19 PM HISTORY/REASON FOR EXAM:  Joya Splenic Fluid Collection TECHNIQUE/EXAM DESCRIPTION: LUQ parasplenic abscess drainage with CT guidance. Low dose optimization technique was utilized for this CT exam including automated exposure control and adjustment of the mA and/or kV according to patient size. PROCEDURE: Informed consent was obtained. SEDATION: Moderate sedation was provided. Pulse oximetry and continuous cardiac monitoring by the nurse was performed throughout the exam. Intraservice time was 30 minutes. Localizing CT images were obtained with the patient in supine position. The skin was prepped with Betadine and draped in a sterile fashion. Following local anesthesia with 1% Lidocaine, a 17 G guiding needle was placed and needle path confirmed with CT. An Amplatz guidewire was placed and following serial tract dilatation, a 10 Romansh pigtail locking catheter was placed. A specimen was collected and submitted for culture and sensitivity and Gram stain. Total of 6 mL of Brownish fluid was drained. The catheter was secured to the skin and connected to suction bulb drainage. Final CT images were obtained documenting catheter position. The patient tolerated the procedure well with no evidence of complication. COMPARISON: CT scan abdomen and pelvis 11/3/2020 FINDINGS: The final CT images show satisfactory catheter position within the target collection.     1.  CT guided perisplenic abscess catheter drainage. 2.  The current plan is to  "obtain a follow-up CT in 5-7 days..    Dx-chest-portable (1 View)    Result Date: 11/3/2020  11/3/2020 3:41 PM HISTORY/REASON FOR EXAM:  Chest Pain. TECHNIQUE/EXAM DESCRIPTION AND NUMBER OF VIEWS: Single portable view of the chest. COMPARISON: September 10, 2020 FINDINGS: The cardiac size is enlarged. There is interstitial prominence. Lung aeration has improved. No pneumothorax. No obvious pleural effusion.     Slight interval interval improvement in congestive heart failure.      Micro:  Results     Procedure Component Value Units Date/Time    CULTURE WOUND W/ GRAM STAIN [[id number]]  (Abnormal)  (Susceptibility) Collected: 11/10/20 1039    Order Status: Completed Specimen: Wound Updated: 11/12/20 1536     Significant Indicator POS     Source WND     Site COLON PERFORATION     Culture Result -     Gram Stain Result Rare WBCs.  Rare Gram positive cocci.  Rare Gram positive rods.       Culture Result Enterococcus faecalis  Light growth        Candida albicans  Light growth      Susceptibility     Enterococcus faecalis (1)     Antibiotic Interpretation Microscan Method Status    Daptomycin Sensitive 4 mcg/mL ISAMAR Final    Ampicillin Sensitive <=2 mcg/mL ISAMAR Final    Gent Synergy Sensitive <=500 mcg/mL ISAMAR Final    Vancomycin Sensitive 1 mcg/mL ISAMAR Final    Penicillin Sensitive 8 mcg/mL ISAMAR Final                   Anaerobic Culture [[id number]] Collected: 11/10/20 1039    Order Status: Completed Specimen: Wound Updated: 11/12/20 1536     Significant Indicator NEG     Source WND     Site COLON PERFORATION     Culture Result Culture in progress.    BLOOD CULTURE [[id number]] Collected: 11/05/20 1950    Order Status: Completed Specimen: Blood from Peripheral Updated: 11/10/20 2100     Significant Indicator NEG     Source BLD     Site PERIPHERAL     Culture Result No growth after 5 days of incubation.    Narrative:      Per Hospital Policy: Only change Specimen Src: to \"Line\" if  specified by physician order.  Right AC    " "BLOOD CULTURE [[id number]] Collected: 11/05/20 1950    Order Status: Completed Specimen: Blood from Peripheral Updated: 11/10/20 2100     Significant Indicator NEG     Source BLD     Site PERIPHERAL     Culture Result No growth after 5 days of incubation.    Narrative:      Per Hospital Policy: Only change Specimen Src: to \"Line\" if  specified by physician order.  Right Hand    GRAM STAIN [[id number]] Collected: 11/10/20 1039    Order Status: Completed Specimen: Wound Updated: 11/10/20 1739     Significant Indicator .     Source WND     Site COLON PERFORATION     Gram Stain Result Rare WBCs.  Rare Gram positive cocci.  Rare Gram positive rods.      SARS-CoV-2, PCR (In-House) [[id number]] Collected: 11/09/20 1855    Order Status: Completed Updated: 11/09/20 2252     SARS-CoV-2 Source NP Swab     SARS-CoV-2 by PCR NotDetected     Comment: [facility] providers: PLEASE REFER TO DE-ESCALATION AND RETESTING PROTOCOL  on [email or web address]  **The TaqPath COVID-19 SARS-CoV-2 test has been made available for use under  the Emergency Use Authorization (EUA) only.         Narrative:      Collected By:[id number] ZULY MOURA  Is patient being admitted?->Yes  Does this patient meet criteria for Rush/Cepheid per [facility]  Inpatient Workflow? (See workflow link below)->Yes  Expected turn around time?->Rush (Cepheid 2-4 hours)  Is this the patients First SARS CoV-2 test?->No  Is this patient employed in healthcare?->No  Is the patient symptomatic as defined by the CDC?->No  Is the patient hospitalized?->Yes  Is the patient in the ICU?->No  Is the patient a resident in a congregate care setting?->No  Is the patient pregnant?->No    COVID/SARS CoV-2 PCR [[id number]] Collected: 11/09/20 1855    Order Status: Completed Specimen: Respirate from Nasopharyngeal Updated: 11/09/20 1907     COVID Order Status Received     Comment: The order for SARS CoV-2 testing has been received by the  Laboratory. This result is neither positive nor " "negative.  Final results of testing will report in 24-48 hours, separately.         Narrative:      Collected By:[id number] ZULY MOURA  Is patient being admitted?->Yes  Does this patient meet criteria for Rush[facility]Cepheid [facility]  [facility] Workflow? (See workflow link below)->Yes  Expected turn around time?->Rush (Cepheid 2-4 hours)  Is this the patients First SARS CoV-2 test?->No  Is this patient employed in healthcare?->No  Is the patient symptomatic as defined by the CDC?->No  Is the patient hospitalized?->Yes  Is the patient in the ICU?->No  Is the patient a resident in a congregate care setting?->No  Is the patient pregnant?->No    BLOOD CULTURE [[id number]] Collected: 11/03/20 1626    Order Status: Completed Specimen: Blood from Peripheral Updated: 11/08/20 1900     Significant Indicator NEG     Source BLD     Site PERIPHERAL     Culture Result No growth after 5 days of incubation.    Narrative:      Per Hospital Policy: Only change Specimen Src: to \"Line\" if  specified by physician order.  No site indicated    Aerobic/Anaerobic Culture (Surgery) [[id number]] Collected: 11/04/20 1730    Order Status: Completed Specimen: Wound from Peritoneal Fluid Updated: 11/08/20 0829     Significant Indicator NEG     Source WND     Site Peritoneal Drain     Culture Result Order placed in Surgery. Source appropriate culture orders  have been placed in Micro for testing.      Narrative:      Special Contact [id number] MAXINE Stevenson fluid from peritoneal drain  Special Contact [id number] MAXINE ELLIS    Anaerobic Culture [[id number]] Collected: 11/04/20 1730    Order Status: Completed Specimen: Wound Updated: 11/08/20 0829     Significant Indicator NEG     Source WND     Site Peritoneal Drain     Culture Result No Anaerobes isolated.    Narrative:      Special Contact [id number] MAXINE Stevenson fluid from peritoneal drain  Special Contact [id number] MAXINE ELLIS    CULTURE WOUND " W/ GRAM STAIN [[id number]]  (Abnormal)  (Susceptibility) Collected: 11/04/20 1730    Order Status: Completed Specimen: Wound Updated: 11/08/20 0829     Significant Indicator POS     Source WND     Site Peritoneal Drain     Culture Result -     Gram Stain Result Rare WBCs.  Few Gram positive cocci.  Few Yeast.       Culture Result Enterococcus faecalis  Heavy growth        Candida albicans  Moderate growth      Narrative:      Special Contact [id number] MAXINE ELLIS  Brown fluid from peritoneal drain  Special Contact [id number] MAXINE ELLIS    Susceptibility     Enterococcus faecalis (1)     Antibiotic Interpretation Microscan Method Status    Daptomycin Sensitive 4 mcg/mL ISAMAR Final    Ampicillin Sensitive <=2 mcg/mL ISAMAR Final    Gent Synergy Sensitive <=500 mcg/mL ISAMAR Final    Vancomycin Sensitive 1 mcg/mL ISAMAR Final    Penicillin Sensitive 2 mcg/mL ISAMAR Final                   CULTURE STOOL [[id number]] Collected: 11/04/20 1609    Order Status: Completed Specimen: Stool Updated: 11/07/20 1608     Significant Indicator NEG     Source STL     Site STOOL     Culture Result Shiga Toxin testing not performed due to lack of growth in  [place] broth.  No enteric pathogens, only usual Gram positive enteric  joselin isolated.  NOTE:  Stool cultures are screened for Shiga Toxins 1 and 2,  Salmonella, Shigella, Campylobacter, Aeromonas,  Plesiomonas, and Vibrio.      Narrative:      Special Contact [id number] EMIL FRANCHESKA  Does this patient have risk factors for C-diff?->Yes  Special Contact [id number] EMIL FRANCHESKA    URINALYSIS [[id number]]  (Abnormal) Collected: 11/06/20 1235    Order Status: Completed Specimen: Urine, Clean Catch Updated: 11/06/20 1327     Color DK Yellow     Character Clear     Specific Gravity 1.019     Ph 5.0     Glucose Negative mg/dL      Ketones Negative mg/dL      Protein Negative mg/dL      Bilirubin Negative     Urobilinogen, Urine 0.2     Nitrite Negative      "Leukocyte Esterase Trace     Occult Blood Negative     Micro Urine Req Microscopic    Narrative:      Collected By:[id number] JF MURDOCK    BLOOD CULTURE [[id number]]  (Abnormal) Collected: 11/03/20 1528    Order Status: Completed Specimen: Blood from Peripheral Updated: 11/06/20 1141     Significant Indicator POS     Source BLD     Site PERIPHERAL     Culture Result Growth detected by Bactec instrument.  11/04/2020  12:52      Viridans Streptococcus  Possible Contaminant  Isolated from one bottle only, correlate with clinical  condition.      Narrative:      CALL  [place]  183 tel. [phone],  CALLED  183 tel. [phone] 11/04/2020, 12:59, RB PERF. RESULTS CALLED TO:  [id number] RN  Per Hospital Policy: Only change Specimen Src: to \"Line\" if  specified by physician order.  No site indicated           ASSESSMENT/PLAN:     Hospital Course:   CT scan on 11/3 revealed a peripherally enhancing perisplenic fluid collection measuring 8.7 x 7.9 cm, communicating with the splenic flexure of the colon and contained foci of air consistent with abscess/infected fluid.  Also noted nonenhancing of the anterior aspect of the spleen suggesting infection/infarction.    Status post surgery on 11/10/2020    Postop hypotension, resolved  Leukocytosis improved  Weaned off pressors  On antibiotics below    Acute kidney injury, worse  Renally dose abx accordingly  Avoid nephrotoxins  Renal ultrasound-unremarkable  Monitor  Nephrology following    Bacteremia  -Blood cultures on 11/31/2 + viridans streptococci, possible contaminant  -Blood culture on 11/5 with no growth to date    Perisplenic abscess with possible communication to the bowel  Left upper quadrant abscess  Afebrile  Splenic enhancement on CT, suggesting infection or infarction  -Drainage on 11/4 and peritoneal fluid Enterococcus faecalis, ampicillin sensitive and Calbicans  Recent ground-level fall located by a subcapsular splenic hematoma  Continue zyvox and fluconazole  Monitor " CBC while on linezolid-platelets stable  Discontinue cefepime and Flagyl today  Status post segmental colectomy, colostomy, irrigation and debridement of flank wound and wound VAC placement on 11/10/2020 by Dr. Desouza.  Patient found to have a left upper quadrant abscess inferior to the spleen with a large bowel wall defect.  Also noted to have an abdominal wall abscess.  Operative cultures (colon perforation) -ampicillin sensitive Enterococcus faecalis and Candida albicans.  Gram stain+ GPC, GPR  Follow drain output-significant drain output overnight, greater than 1000 cc  Repeat CT scan    Type II diabetes mellitus  Keep BS under 150 to help control current infection    A. Fib  Contributing to hypotension  Cards following     Diarrhea  -C. difficile tested negative on 11/4    Antibiotic allergies: Daptomycin rash, penicillins rash and hives, tolerates cephalosporins-discussed allergies and he states that he has developed significant rash and hives with penicillins several times, most recently a year and a half ago- will avoid penicillins     I have performed a physical exam and reviewed and updated ROS and plan today 11/13/2020.  In review of yesterday's note 11/12/2020, there are no changes except as documented above.      Plan of care discussed with RN.

## 2020-11-14 LAB
ALBUMIN SERPL BCP-MCNC: 2.9 G/DL (ref 3.2–4.9)
ALBUMIN/GLOB SERPL: 1 G/DL
ALP SERPL-CCNC: 66 U/L (ref 30–99)
ALT SERPL-CCNC: 9 U/L (ref 2–50)
ANION GAP SERPL CALC-SCNC: 10 MMOL/L (ref 7–16)
AST SERPL-CCNC: 19 U/L (ref 12–45)
BASOPHILS # BLD AUTO: 0.2 % (ref 0–1.8)
BASOPHILS # BLD: 0.02 K/UL (ref 0–0.12)
BILIRUB SERPL-MCNC: 0.4 MG/DL (ref 0.1–1.5)
BUN SERPL-MCNC: 41 MG/DL (ref 8–22)
CALCIUM SERPL-MCNC: 8.1 MG/DL (ref 8.5–10.5)
CHLORIDE SERPL-SCNC: 96 MMOL/L (ref 96–112)
CO2 SERPL-SCNC: 28 MMOL/L (ref 20–33)
CREAT SERPL-MCNC: 2.6 MG/DL (ref 0.5–1.4)
DIGOXIN SERPL-MCNC: 1.5 NG/ML (ref 0.8–2)
EKG IMPRESSION: NORMAL
EOSINOPHIL # BLD AUTO: 0 K/UL (ref 0–0.51)
EOSINOPHIL NFR BLD: 0 % (ref 0–6.9)
ERYTHROCYTE [DISTWIDTH] IN BLOOD BY AUTOMATED COUNT: 53.7 FL (ref 35.9–50)
GLOBULIN SER CALC-MCNC: 2.8 G/DL (ref 1.9–3.5)
GLUCOSE BLD-MCNC: 226 MG/DL (ref 65–99)
GLUCOSE BLD-MCNC: 238 MG/DL (ref 65–99)
GLUCOSE BLD-MCNC: 249 MG/DL (ref 65–99)
GLUCOSE SERPL-MCNC: 244 MG/DL (ref 65–99)
HCT VFR BLD AUTO: 33.8 % (ref 42–52)
HGB BLD-MCNC: 10.6 G/DL (ref 14–18)
IMM GRANULOCYTES # BLD AUTO: 0.23 K/UL (ref 0–0.11)
IMM GRANULOCYTES NFR BLD AUTO: 2.3 % (ref 0–0.9)
LYMPHOCYTES # BLD AUTO: 1.39 K/UL (ref 1–4.8)
LYMPHOCYTES NFR BLD: 14 % (ref 22–41)
MCH RBC QN AUTO: 25.9 PG (ref 27–33)
MCHC RBC AUTO-ENTMCNC: 31.4 G/DL (ref 33.7–35.3)
MCV RBC AUTO: 82.4 FL (ref 81.4–97.8)
MONOCYTES # BLD AUTO: 0.67 K/UL (ref 0–0.85)
MONOCYTES NFR BLD AUTO: 6.7 % (ref 0–13.4)
NEUTROPHILS # BLD AUTO: 7.63 K/UL (ref 1.82–7.42)
NEUTROPHILS NFR BLD: 76.8 % (ref 44–72)
NRBC # BLD AUTO: 0.05 K/UL
NRBC BLD-RTO: 0.5 /100 WBC
PLATELET # BLD AUTO: 291 K/UL (ref 164–446)
PMV BLD AUTO: 10 FL (ref 9–12.9)
POTASSIUM SERPL-SCNC: 4.5 MMOL/L (ref 3.6–5.5)
PROT SERPL-MCNC: 5.7 G/DL (ref 6–8.2)
RBC # BLD AUTO: 4.1 M/UL (ref 4.7–6.1)
SODIUM SERPL-SCNC: 134 MMOL/L (ref 135–145)
WBC # BLD AUTO: 9.9 K/UL (ref 4.8–10.8)

## 2020-11-14 PROCEDURE — A9270 NON-COVERED ITEM OR SERVICE: HCPCS | Performed by: INTERNAL MEDICINE

## 2020-11-14 PROCEDURE — 99233 SBSQ HOSP IP/OBS HIGH 50: CPT | Performed by: INTERNAL MEDICINE

## 2020-11-14 PROCEDURE — 700111 HCHG RX REV CODE 636 W/ 250 OVERRIDE (IP): Performed by: SURGERY

## 2020-11-14 PROCEDURE — 82962 GLUCOSE BLOOD TEST: CPT | Mod: 91

## 2020-11-14 PROCEDURE — 93005 ELECTROCARDIOGRAM TRACING: CPT | Performed by: INTERNAL MEDICINE

## 2020-11-14 PROCEDURE — A9270 NON-COVERED ITEM OR SERVICE: HCPCS | Performed by: STUDENT IN AN ORGANIZED HEALTH CARE EDUCATION/TRAINING PROGRAM

## 2020-11-14 PROCEDURE — 85025 COMPLETE CBC W/AUTO DIFF WBC: CPT

## 2020-11-14 PROCEDURE — 700111 HCHG RX REV CODE 636 W/ 250 OVERRIDE (IP): Performed by: NURSE PRACTITIONER

## 2020-11-14 PROCEDURE — 700102 HCHG RX REV CODE 250 W/ 637 OVERRIDE(OP): Performed by: INTERNAL MEDICINE

## 2020-11-14 PROCEDURE — 93010 ELECTROCARDIOGRAM REPORT: CPT | Performed by: INTERNAL MEDICINE

## 2020-11-14 PROCEDURE — 80053 COMPREHEN METABOLIC PANEL: CPT

## 2020-11-14 PROCEDURE — 700111 HCHG RX REV CODE 636 W/ 250 OVERRIDE (IP): Performed by: FAMILY MEDICINE

## 2020-11-14 PROCEDURE — 97606 NEG PRS WND THER DME>50 SQCM: CPT

## 2020-11-14 PROCEDURE — 770001 HCHG ROOM/CARE - MED/SURG/GYN PRIV*

## 2020-11-14 PROCEDURE — 99291 CRITICAL CARE FIRST HOUR: CPT | Performed by: SURGERY

## 2020-11-14 PROCEDURE — 80162 ASSAY OF DIGOXIN TOTAL: CPT

## 2020-11-14 PROCEDURE — 700102 HCHG RX REV CODE 250 W/ 637 OVERRIDE(OP): Performed by: STUDENT IN AN ORGANIZED HEALTH CARE EDUCATION/TRAINING PROGRAM

## 2020-11-14 RX ADMIN — LINEZOLID 600 MG: 600 TABLET, FILM COATED ORAL at 05:46

## 2020-11-14 RX ADMIN — ONDANSETRON 4 MG: 2 INJECTION INTRAMUSCULAR; INTRAVENOUS at 09:26

## 2020-11-14 RX ADMIN — FENTANYL CITRATE 50 MCG: 50 INJECTION, SOLUTION INTRAMUSCULAR; INTRAVENOUS at 17:00

## 2020-11-14 RX ADMIN — OLANZAPINE 7.5 MG: 5 TABLET, FILM COATED ORAL at 21:19

## 2020-11-14 RX ADMIN — ONDANSETRON 4 MG: 2 INJECTION INTRAMUSCULAR; INTRAVENOUS at 17:09

## 2020-11-14 RX ADMIN — FLUCONAZOLE 400 MG: 200 TABLET ORAL at 05:46

## 2020-11-14 RX ADMIN — INSULIN GLARGINE 10 UNITS: 100 INJECTION, SOLUTION SUBCUTANEOUS at 06:01

## 2020-11-14 RX ADMIN — INSULIN HUMAN 4 UNITS: 100 INJECTION, SOLUTION PARENTERAL at 06:02

## 2020-11-14 RX ADMIN — INSULIN HUMAN 4 UNITS: 100 INJECTION, SOLUTION PARENTERAL at 13:02

## 2020-11-14 RX ADMIN — HEPARIN SODIUM 5000 UNITS: 5000 INJECTION, SOLUTION INTRAVENOUS; SUBCUTANEOUS at 05:45

## 2020-11-14 RX ADMIN — INSULIN HUMAN 4 UNITS: 100 INJECTION, SOLUTION PARENTERAL at 17:02

## 2020-11-14 RX ADMIN — HEPARIN SODIUM 5000 UNITS: 5000 INJECTION, SOLUTION INTRAVENOUS; SUBCUTANEOUS at 13:02

## 2020-11-14 RX ADMIN — INSULIN HUMAN 4 UNITS: 100 INJECTION, SOLUTION PARENTERAL at 23:55

## 2020-11-14 RX ADMIN — INSULIN HUMAN 4 UNITS: 100 INJECTION, SOLUTION PARENTERAL at 00:07

## 2020-11-14 RX ADMIN — FENTANYL CITRATE 50 MCG: 50 INJECTION, SOLUTION INTRAMUSCULAR; INTRAVENOUS at 10:34

## 2020-11-14 RX ADMIN — SERTRALINE 125 MG: 100 TABLET, FILM COATED ORAL at 05:47

## 2020-11-14 RX ADMIN — FENTANYL CITRATE 50 MCG: 50 INJECTION, SOLUTION INTRAMUSCULAR; INTRAVENOUS at 04:26

## 2020-11-14 RX ADMIN — DIGOXIN 125 MCG: 0.25 INJECTION INTRAMUSCULAR; INTRAVENOUS at 16:58

## 2020-11-14 RX ADMIN — HYDROCORTISONE SODIUM SUCCINATE 50 MG: 100 INJECTION, POWDER, FOR SOLUTION INTRAMUSCULAR; INTRAVENOUS at 17:00

## 2020-11-14 RX ADMIN — HEPARIN SODIUM 5000 UNITS: 5000 INJECTION, SOLUTION INTRAVENOUS; SUBCUTANEOUS at 21:19

## 2020-11-14 RX ADMIN — LINEZOLID 600 MG: 600 TABLET, FILM COATED ORAL at 17:02

## 2020-11-14 RX ADMIN — HYDROCORTISONE SODIUM SUCCINATE 50 MG: 100 INJECTION, POWDER, FOR SOLUTION INTRAMUSCULAR; INTRAVENOUS at 05:45

## 2020-11-14 ASSESSMENT — FIBROSIS 4 INDEX: FIB4 SCORE: 1.26

## 2020-11-14 ASSESSMENT — PAIN DESCRIPTION - PAIN TYPE
TYPE: SURGICAL PAIN
TYPE: ACUTE PAIN

## 2020-11-14 ASSESSMENT — ENCOUNTER SYMPTOMS
SHORTNESS OF BREATH: 0
DIARRHEA: 0
DIZZINESS: 0
CHILLS: 0
TREMORS: 0
COUGH: 0
SPUTUM PRODUCTION: 0
HEADACHES: 0
VOMITING: 0
FEVER: 0
MYALGIAS: 0
ABDOMINAL PAIN: 1
ROS GI COMMENTS: STABLE
CONSTIPATION: 0
SENSORY CHANGE: 0
NAUSEA: 0

## 2020-11-14 NOTE — WOUND TEAM
[facility]  Inpatient Services   Wound and Skin Care Evaluation    Admission Date: 11/3/2020     HPI, PMH, SH: Reviewed    Unit where seen by Wound Team: [id number]     WOUND CONSULT/FOLLOW UP RELATED TO: VAC and ostomy appliance changes    Subjective: states he is going to a new group home.     Self Report / Pain Level:  Premedicated IV by RN. Winces with some pain    OBJECTIVE:  DARYL bariatric bed, pillows for support    WOUND TYPE, LOCATION, CHARACTERISTICS (Pressure Injuries: location, stage, POA or date identified)     Negative Pressure Wound Therapy 11/10/20 Surgical Abdomen Midline (Active)   Vacuum Serial Number [id number]    NPWT Pump Mode / Pressure Setting 125 mmHg;Continuous    Dressing Type medium;Black Foam (Regular)    Number of Foam Pieces Used 4    Canister Changed No         Wound 11/10/20 Full Thickness Wound Abdomen Midline wound vac (Active)   Wound Image   11/12/20   Site Assessment Red    Periwound Assessment Intact    Margins Defined edges    Closure Staples    Drainage Amount Moderate    Drainage Description Serosanguineous    Treatments Cleansed    Wound Cleansing Approved Wound Cleanser    Periwound Protectant Skin Protectant Wipes to Periwound    Dressing Options Wound Vac    Dressing Changed Changed    Dressing Change/Treatment Frequency Tuesday, Thursday, Saturday, and As Needed    NEXT Dressing Change/Treatment Date 11/17/20    NEXT Weekly Photo (Inpatient Only) 11/19/20    Non-staged Wound Description Full thickness    Wound Length (cm) 21 cm Measured 11/12/20   Wound Width (cm) 6 cm    Wound Depth (cm) 6 cm    Wound Surface Area (cm^2) 126 cm^2    Wound Volume (cm^3) 756 cm^3    Wound Bed Granulation (%) 80 %    Wound Odor None    Exposed Structures None                  Lab Values:    Lab Results   Component Value Date/Time    WBC 9.9 11/14/2020 04:20 AM    RBC 4.10 (L) 11/14/2020 04:20 AM    HEMOGLOBIN 10.6 (L) 11/14/2020 04:20 AM    HEMATOCRIT 33.8 (L) 11/14/2020 04:20  AM    CREACTPROT 0.54 10/23/2018 08:02 AM    SEDRATEWES 13 10/23/2018 08:02 AM    HBA1C 7.8 (H) 11/10/2020 03:31 AM        Culture Results show:  Recent Results (from the past 720 hour(s))   CULTURE WOUND W/ GRAM STAIN    Collection Time: 11/10/20 10:39 AM    Specimen: Wound   Result Value Ref Range    Significant Indicator POS (POS)     Source WND     Site COLON PERFORATION     Culture Result - (A)     Gram Stain Result       Rare WBCs.  Rare Gram positive cocci.  Rare Gram positive rods.      Culture Result Enterococcus faecalis  Light growth   (A)     Culture Result Candida albicans  Light growth   (A)        Susceptibility    Enterococcus faecalis - ISAMAR     Daptomycin 4 Sensitive mcg/mL     Ampicillin <=2 Sensitive mcg/mL     Gent Synergy <=500 Sensitive mcg/mL     Vancomycin 1 Sensitive mcg/mL     Penicillin 8 Sensitive mcg/mL       INTERVENTIONS BY WOUND TEAM:  Started with ostomy appliance change, see note below. Removed previous VAC dressing, after patient was premedicated. Cleaned wound with wound cleanser. Applied skin prep to periwound skin. Covered staples distal to larger wound with gauze, then drape. Placed one strip of foam to larger wound bed, tucked under 12:00 small track. Covered large wound with second larger foam, then small     Interdisciplinary consultation: Patient, Bedside RN, Dr. Desouza.     EVALUATION / RATIONALE FOR TREATMENT: Would bed is granular. No longer has tract between superior and inferior wounds.     Goals: Steady decrease in wound area and depth weekly.    NURSING PLAN OF CARE ORDERS (X):    Dressing changes: See UPDATED Dressing Care orders: X  Skin care: See Skin Care orders: X  RN Prevention Protocol: X  Rectal tube care: See Rectal Tube Care orders:   Other orders:    WOUND TEAM PLAN OF CARE:   Dressing changes by wound team:                   Follow up 3 times weekly:                NPWT change 3 times weekly:   ABD  Follow up 1-2 times weekly:    Left flank   Follow up  "Bi-Monthly:                   Follow up as needed:       Other (explain):     Anticipated discharge plans: will need ongoing wound care post DC  LTACH:        SNF/Rehab:                  Home Health Care:           Outpatient Wound Center:            Self Care:               [facility]  Inpatient Services  New Ostomy Management & Teaching    HPI:  Reviewed  PMH: Reviewed   SH: Reviewed    Subjective: States that he will need a lot of practice.    Objective:    Colostomy 11/10/20 Transverse LUQ (Active)   Stomal Appliance Assessment Intact    Stoma Assessment Red;Edema    Stoma Shape Budded Less Than One Inch;Round    Stoma Size (in) 1.5    Peristomal Assessment Intact    Mucocutaneous Junction Intact    Treatment Appliance Changed    Peristomal Protectant Paste Ring    Stomal Appliance 2 1/4\" (57mm) CTF    Output (mL) 50 mL    Output Color Black    WOUND RN ONLY - Stomal Appliance  Paste Ring, 2\";2 1/4\" (57mm) CTF;Transparent Pouch Lock & Roll    Appliance Brand Daisy    Appliance Supplier [employer]    Secure Start completed Yes    WOUND NURSE ONLY - Time Spent with Patient (mins) 45                   Ostomy Appliance (type and size):  2 piece 2 1/4 with paste ring      Interventions:  Previous appliance removed, cleansed skin with warm wash cloth. Measured with cardboard guide. Demonstrated to patient how to trace and cut out barrier. Discussed how often to empty and change appliance.     Pt education: Questions and concerns addressed    Evaluation:  Patient willing to learn how to take care of ostomy himself.    Plan: Ostomy nurses to continue to follow for ostomy needs and teaching     Anticipated discharge needs: Supplies, supplier information, possible HH or outpatient ostomy clinic   "

## 2020-11-14 NOTE — PROGRESS NOTES
Progress Note               Author: Sarah Cruz M.D. Date & Time created: 11/14/2020  9:44 AM     Interval History:  Feeling stronger today. Did well with clear liquids, no n/v. Pain controlled. Sat at edge of bed with less assistance today.     Review of Systems:  Review of Systems   Constitutional: Negative for chills and fever.   Gastrointestinal: Positive for abdominal pain. Negative for nausea and vomiting.       Physical Exam:  Physical Exam  Constitutional:       General: He is not in acute distress.     Appearance: Normal appearance. He is not ill-appearing or toxic-appearing.   Eyes:      Extraocular Movements: Extraocular movements intact.   Cardiovascular:      Rate and Rhythm: Normal rate and regular rhythm.   Pulmonary:      Effort: Pulmonary effort is normal.   Abdominal:      General: There is distension.      Tenderness: There is abdominal tenderness.      Comments: Mild appropriate incisional tenderness. Ostomy pink, stool in bag. Wound vac intact. BETTE serous, clear. 740cc (down from 1040 the day before). Midline vac serous 300cc.    Musculoskeletal: Normal range of motion.   Skin:     General: Skin is warm and dry.      Capillary Refill: Capillary refill takes less than 2 seconds.   Neurological:      General: No focal deficit present.      Mental Status: He is alert and oriented to person, place, and time.         Labs:          Recent Labs     11/12/20  0500 11/13/20 0545 11/14/20 0420   SODIUM 132* 131* 134*   POTASSIUM 5.7* 5.1 4.5   CHLORIDE 102 98 96   CO2 19* 23 28   BUN 33* 39* 41*   CREATININE 2.57* 2.84* 2.60*   CALCIUM 8.3* 8.1* 8.1*     Recent Labs     11/12/20  0500 11/13/20  0545 11/14/20  0420   ALTSGPT 11 7 9   ASTSGOT 17 17 19   ALKPHOSPHAT 67 63 66   TBILIRUBIN 0.5 0.4 0.4   GLUCOSE 308* 270* 244*     Recent Labs     11/12/20  0500 11/13/20  0545 11/14/20  0420   RBC 3.66* 3.96* 4.10*   HEMOGLOBIN 9.3* 10.3* 10.6*   HEMATOCRIT 30.5* 32.9* 33.8*   PLATELETCT 309 281 291      Recent Labs     20  0500 20  0545 20  0420   WBC 13.3* 13.8* 9.9   NEUTSPOLYS 78.50* 81.90* 76.80*   LYMPHOCYTES 12.40* 10.00* 14.00*   MONOCYTES 7.10 6.00 6.70   EOSINOPHILS 0.10 0.00 0.00   BASOPHILS 0.30 0.10 0.20   ASTSGOT 17 17 19   ALTSGPT 11 7 9   ALKPHOSPHAT 67 63 66   TBILIRUBIN 0.5 0.4 0.4     Hemodynamics:  Temp (24hrs), Av.2 °C (97.2 °F), Min:35.8 °C (96.4 °F), Max:36.6 °C (97.9 °F)  Temperature: 36.6 °C (97.9 °F)  Pulse  Av.8  Min: 51  Max: 161   Blood Pressure: 156/99  CVP (mm Hg): (!) 288 MM HG  Respiratory:    Respiration: 20, Pulse Oximetry: 96 %     Work Of Breathing / Effort: Shallow;Tachypnea  RUL Breath Sounds: Diminished, RML Breath Sounds: Diminished, RLL Breath Sounds: Diminished, ELIAS Breath Sounds: Diminished, LLL Breath Sounds: Diminished  Fluids:    Intake/Output Summary (Last 24 hours) at 2020 0944  Last data filed at 2020 0830  Gross per 24 hour   Intake 4051.67 ml   Output 2140 ml   Net 1911.67 ml     Weight: (!) 179 kg (394 lb 10 oz)  GI/Nutrition:  Orders Placed This Encounter   Procedures   • Diet Order Diet: Full Liquid     Standing Status:   Standing     Number of Occurrences:   1     Order Specific Question:   Diet:     Answer:   Full Liquid [11]     Medical Decision Making, by Problem:  Active Hospital Problems    Diagnosis   • Spontaneous perforation of colon (HCC) [K63.1]   • Adrenal insufficiency (HCC) [E27.40]   • Sepsis (HCC) [A41.9]   • Bacteremia [R78.81]   • Paroxysmal A-fib with RVR (HCC) [I48.0]   • SRUTHI (acute kidney injury) (HCC) [N17.9]   • Longstanding persistent atrial fibrillation (HCC) [I48.11]   • LV dysfunction [I51.9]       Plan:  POD 4 Interiano's procedure for perforation of splenic flexure.     Continuing to improve.   Ostomy functioning, drain output decreasing. Amylase low at 18.   Advance to full liquid diet.   Wound vac changes Tuesday, Thursday, Saturday.   Appreciate critical care assistance.       Quality-Core  Measures

## 2020-11-14 NOTE — PROGRESS NOTES
Trauma / Surgical Daily Progress Note    Date of Service  11/14/2020    Chief Complaint  55 y.o. male admitted 11/3/2020 with Intraabdominal fluid collection    Interval Events  Ongoing critical illness  Afib rate controlled with digoxin  UOP picked up - 800 mLs overnight  Stoma functioning 100 mLs overnight  Seen by consultants  Weaning steroids  Lantus plus sliding scale    Review of Systems  Review of Systems     Vital Signs for last 24 hours  Temp:  [35.8 °C (96.4 °F)-36.6 °C (97.9 °F)] 36.6 °C (97.9 °F)  Pulse:  [] 105  Resp:  [11-31] 23  BP: (120-156)/(67-99) 130/87  SpO2:  [96 %-99 %] 96 %    Hemodynamic parameters for last 24 hours  CVP:  [58 MM HG-289 MM HG] 288 MM HG    Respiratory Data     Respiration: (!) 23, Pulse Oximetry: 96 %     Work Of Breathing / Effort: Shallow;Tachypnea  RUL Breath Sounds: Diminished, RML Breath Sounds: Diminished, RLL Breath Sounds: Diminished, ELIAS Breath Sounds: Diminished, LLL Breath Sounds: Diminished    Physical Exam  Physical Exam  Vitals signs and nursing note reviewed.   Constitutional:       Appearance: He is obese.   HENT:      Head: Normocephalic and atraumatic.      Right Ear: External ear normal.      Left Ear: External ear normal.      Nose: Nose normal.      Mouth/Throat:      Mouth: Mucous membranes are moist.      Pharynx: Oropharynx is clear.   Eyes:      Conjunctiva/sclera: Conjunctivae normal.      Pupils: Pupils are equal, round, and reactive to light.   Neck:      Musculoskeletal: Normal range of motion and neck supple.   Cardiovascular:      Rate and Rhythm: Tachycardia present. Rhythm irregular.      Pulses: Normal pulses.      Heart sounds: Normal heart sounds.   Pulmonary:      Effort: Pulmonary effort is normal.      Breath sounds: Normal breath sounds.   Abdominal:      Comments: Stoma healthy  Midline dressings intact   Genitourinary:     Comments: Zabala in place  Musculoskeletal: Normal range of motion.      Right lower leg: Edema present.       Left lower leg: Edema present.   Skin:     General: Skin is warm and dry.      Capillary Refill: Capillary refill takes less than 2 seconds.   Neurological:      General: No focal deficit present.      Mental Status: He is alert and oriented to person, place, and time.   Psychiatric:         Mood and Affect: Mood normal.         Behavior: Behavior normal.         Laboratory  Recent Results (from the past 24 hour(s))   ACCU-CHEK GLUCOSE    Collection Time: 11/13/20  5:43 PM   Result Value Ref Range    Glucose - Accu-Ck 242 (H) 65 - 99 mg/dL   Comp Metabolic Panel    Collection Time: 11/14/20  4:20 AM   Result Value Ref Range    Sodium 134 (L) 135 - 145 mmol/L    Potassium 4.5 3.6 - 5.5 mmol/L    Chloride 96 96 - 112 mmol/L    Co2 28 20 - 33 mmol/L    Anion Gap 10.0 7.0 - 16.0    Glucose 244 (H) 65 - 99 mg/dL    Bun 41 (H) 8 - 22 mg/dL    Creatinine 2.60 (H) 0.50 - 1.40 mg/dL    Calcium 8.1 (L) 8.5 - 10.5 mg/dL    AST(SGOT) 19 12 - 45 U/L    ALT(SGPT) 9 2 - 50 U/L    Alkaline Phosphatase 66 30 - 99 U/L    Total Bilirubin 0.4 0.1 - 1.5 mg/dL    Albumin 2.9 (L) 3.2 - 4.9 g/dL    Total Protein 5.7 (L) 6.0 - 8.2 g/dL    Globulin 2.8 1.9 - 3.5 g/dL    A-G Ratio 1.0 g/dL   CBC WITH DIFFERENTIAL    Collection Time: 11/14/20  4:20 AM   Result Value Ref Range    WBC 9.9 4.8 - 10.8 K/uL    RBC 4.10 (L) 4.70 - 6.10 M/uL    Hemoglobin 10.6 (L) 14.0 - 18.0 g/dL    Hematocrit 33.8 (L) 42.0 - 52.0 %    MCV 82.4 81.4 - 97.8 fL    MCH 25.9 (L) 27.0 - 33.0 pg    MCHC 31.4 (L) 33.7 - 35.3 g/dL    RDW 53.7 (H) 35.9 - 50.0 fL    Platelet Count 291 164 - 446 K/uL    MPV 10.0 9.0 - 12.9 fL    Neutrophils-Polys 76.80 (H) 44.00 - 72.00 %    Lymphocytes 14.00 (L) 22.00 - 41.00 %    Monocytes 6.70 0.00 - 13.40 %    Eosinophils 0.00 0.00 - 6.90 %    Basophils 0.20 0.00 - 1.80 %    Immature Granulocytes 2.30 (H) 0.00 - 0.90 %    Nucleated RBC 0.50 /100 WBC    Neutrophils (Absolute) 7.63 (H) 1.82 - 7.42 K/uL    Lymphs (Absolute) 1.39 1.00 - 4.80  K/uL    Monos (Absolute) 0.67 0.00 - 0.85 K/uL    Eos (Absolute) 0.00 0.00 - 0.51 K/uL    Baso (Absolute) 0.02 0.00 - 0.12 K/uL    Immature Granulocytes (abs) 0.23 (H) 0.00 - 0.11 K/uL    NRBC (Absolute) 0.05 K/uL   DIGOXIN    Collection Time: 20  4:20 AM   Result Value Ref Range    Digoxin 1.5 0.8 - 2.0 ng/mL   ESTIMATED GFR    Collection Time: 20  4:20 AM   Result Value Ref Range    GFR If  31 (A) >60 mL/min/1.73 m 2    GFR If Non African American 25 (A) >60 mL/min/1.73 m 2   ACCU-CHEK GLUCOSE    Collection Time: 20  6:00 AM   Result Value Ref Range    Glucose - Accu-Ck 226 (H) 65 - 99 mg/dL   EKG    Collection Time: 20 11:36 AM   Result Value Ref Range    Report       [facility]    Test Date:  2020  Pt Name:    EMELY FLORES                Department: 19  MRN:        [id number]                      Room:       [facility]  Gender:     Male                         Technician: [facility]  :        1965                   Requested By:BIGG KEBEDE  Order #:    [id number]                    Reading MD: Wicho Camacho MD    Measurements  Intervals                                Axis  Rate:       101                          P:  WA:                                      QRS:        15  QRSD:       98                           T:          50  QT:         344  QTc:        446    Interpretive Statements  ATRIAL FIBRILLATION  LOW VOLTAGE IN FRONTAL LEADS  BORDERLINE R WAVE PROGRESSION, ANTERIOR LEADS  Compared to ECG 2020 06:15:52  Low QRS voltage now present  Left anterior fascicular block no longer present  Intraventricular conduction delay no longer present  Electronically Signed On 2020 12:25:02 PST by Wicho Camacho MD     ACCU-CHEK GLUCOSE    Collection Time: 20  1:01 PM   Result Value Ref Range    Glucose - Accu-Ck 238 (H) 65 - 99 mg/dL       Fluids    Intake/Output Summary (Last 24 hours) at 2020 1501  Last data filed at 2020 1100  Gross per  24 hour   Intake 2531.67 ml   Output 2190 ml   Net 341.67 ml       Core Measures & Quality Metrics  Labs reviewed, Medications reviewed and Radiology images reviewed  Zabala catheter: Critically Ill - Requiring Accurate Measurement of Urinary Output and Strict Intake and Output During Sepisis or Shock      DVT Prophylaxis: Heparin  DVT prophylaxis - mechanical: SCDs  Ulcer prophylaxis: Not indicated  Antibiotics: Treating active infection/contamination beyond 24 hours perioperative coverage      LEONOR Score  ETOH Screening    Assessment/Plan  SRUTHI (acute kidney injury) ([facility])  Assessment & Plan  Increasing azotemia   Relative oliguria   11/11 Hyperkalemia requiring insulin and kayexalate, Na Naco3 infusion   11/12 Continue volume expansion, avoid nephrotoxins, nephrology consult   11/13 Fluids changed to NS, no need for dialysis   Fadi Najjar, MD.  Nephrology    Spontaneous perforation of colon ([facility])- (present on admission)  Assessment & Plan  Admitted with LUQ abscess and suspicion of perforation at splenic flexure  11/9 Drain with stool and unable to hold suction  11/10 Segmental colectomy, colostomy, Patti pouch, I & D of abdominal wall abscess  Kin Desouza DO General Surgery    Sepsis ([facility])  Assessment & Plan  11/3 Ceftriaxone and metronidazole initiated  11/5 Fluconazole initiated  11/7 Linezolid initiated  11/8 Drain with Candida albicans and Enterococcus faecalis  11/9 Ceftriaxone discontinued  11/10 Cefepime initiated  - Septic shock in the post operative period, vasopressors initiated   11/12 Weaned off vasopressors  11/13 Cefepime and fluconazole discontinued  Antibiotic Management by [facility] physicians     Adrenal insufficiency ([facility])  Assessment & Plan  Cortisol 17 in stress state  Hydrocortisone   11/13  Weaned to 50 BID    LV dysfunction- (present on admission)  Assessment & Plan  BP low after abscess drainage  11/11 Echocardiogram completed with normal left ventricular chamber  size.  Moderately reduced left ventricular systolic function.  Left ventricular ejection fraction is visually estimated to be 30-40%    Paroxysmal A-fib with RVR (HCC)- (present on admission)  Assessment & Plan  Recurrent    11/8 Cardiology consult  11/10 Amiodarone drip initiated   11/11 Added digoxin   11/12 Stopped amiodarone   11/14 Digoxin dose decreased   Sirena Larson MD.  Cardiology (sign off)    Plan:  Diet advanced to full liquid this morning. Await full return of GI function.  Rate controlled afib, ensure K and Mg adequate  Aggressive pulmonary toilet. Patient is at high risk for decompensation with the threat of imminent deterioration, life threatening deterioration, and loss of vital organ function.  Acute renal failure - gentle resuscitation ongoing. Cr has stabilized and UOP is adequate but additional crystalloid should be given with caution given his heart failure.  Will leave his Lantus and high sliding scale today as we are rapidly weaning his sepsis steroids.   Therapies    Discussed patient condition with RN, RT and Pharmacy.  The patient is/remains critically ill with perforated colon, morbid obesity, diabetes, atrial fibrillation, respiratory failure. Patient is at high risk for decompensation with the threat of imminent deterioration, life threatening deterioration, and loss of vital organ function.    I provided the following critical care services: management of above, high risk medication management, resuscitation.    Critical care time spent exclusive of procedures: 38 minutes.    Ras Apodaca MD  [phone]

## 2020-11-14 NOTE — PROGRESS NOTES
Infectious Disease Progress Note    Author: Jane Anderson M.D. Date & Time of service: 11/14/2020  9:54 AM    Chief Complaint:  Intrabdominal abscess and bacteremia    Interval History:   55 y.o. diabetic male admitted 11/3/2020.+ A. fib, CHD, and severe morbid obesity.  Seen by ID in the past for right foot abscess with cultures positive for MRSA and Proteus.  s/p GLF approximately 1 month ago and was admitted to [facility] with left upper quadrant pain.  CT at the time revealed a subcapsular splenic hematoma. Presented to ED complaining of abdominal pain described as 4/5 out of 10 dull and constant located in the left upper quadrant.    11/9 AF WBC 9.4 Less pain in side but remains tender to touch. Denies SE Zyvox-counseled about sxs serotonin syndrome  11/11 T-max 99.7 WBC 15.4 patient underwent colectomy, colostomy placement and I&D of flank wound with wound VAC placement on 11/10/2020 by Dr. Desouza.  Postop course complicated by hypotension and transferred to the ICU.  On pressors.  Patient complaining of shortness of breath as well as lightheadedness.  Also has postop abdominal pain.  11/12 afebrile WBC 13.3.  Patient weaned off pressors overnight.  Operative cultures growing group D Enterococcus and yeast.  Patient feeling better today.  Improved shortness of breath.  Abdominal pain stable  11/13 afebrile WBC 13.8 patient continues to feel better.  He is inquiring when he can have food by mouth.  11/14 afebrile WBC 9.9.  Patient feeling a bit better today.  His diet has been slowly advanced.  He is however complaining of some sharp abdominal pain when he coughs    Review of Systems:  Review of Systems   Constitutional: Positive for malaise/fatigue. Negative for chills and fever.   Respiratory: Negative for cough, sputum production and shortness of breath.    Gastrointestinal: Positive for abdominal pain. Negative for constipation, diarrhea, nausea and vomiting.        Stable   Musculoskeletal:  Negative for myalgias.   Neurological: Negative for dizziness, tremors, sensory change and headaches.   All other systems reviewed and are negative.      Hemodynamics:  Temp (24hrs), Av.2 °C (97.2 °F), Min:35.8 °C (96.4 °F), Max:36.6 °C (97.9 °F)  Temperature: 36.6 °C (97.9 °F)  Pulse  Av.8  Min: 51  Max: 161   Blood Pressure: 156/99  CVP (mm Hg): (!) 288 MM HG    Physical Exam:  Physical Exam  Vitals signs and nursing note reviewed.   Constitutional:       General: He is not in acute distress.     Appearance: He is obese. He is ill-appearing. He is not diaphoretic.   HENT:      Nose: No rhinorrhea.      Mouth/Throat:      Pharynx: No oropharyngeal exudate.   Eyes:      General: No scleral icterus.     Extraocular Movements: Extraocular movements intact.      Pupils: Pupils are equal, round, and reactive to light.   Cardiovascular:      Rate and Rhythm: Tachycardia present. Rhythm irregular.   Pulmonary:      Effort: No respiratory distress.      Comments: Decreased BS bilaterally    Right upper chest tunneled catheter-nontender, no surrounding erythema  Abdominal:      General: There is no distension.      Palpations: Abdomen is soft.      Tenderness: There is abdominal tenderness. There is no guarding.      Comments: Midline abdominal surgical site with wound VAC in place, colostomy bag.  Left lower quadrant drain with serosanguineous fluid in bulb   Musculoskeletal:      Right lower leg: Edema present.      Left lower leg: Edema present.      Comments: Evidence of prior toe amputations.  Sites clean   Skin:     General: Skin is warm.      Coloration: Skin is not jaundiced.      Findings: Bruising and erythema present.   Neurological:      General: No focal deficit present.      Mental Status: He is alert and oriented to person, place, and time.   Psychiatric:         Mood and Affect: Mood normal.         Behavior: Behavior normal.         Meds:    Current Facility-Administered Medications:   •   fluconazole  •  insulin glargine  •  hydrocortisone sodium succinate PF  •  digoxin  •  NS  •  heparin  •  insulin regular **AND** POC Blood Glucose **AND** NOTIFY MD and PharmD **AND** glucose **AND** dextrose 50%  •  albuterol  •  fentaNYL  •  ondansetron  •  linezolid  •  ondansetron  •  OLANZapine  •  sertraline    Labs:  Recent Labs     11/12/20  0500 11/13/20  0545 11/14/20  0420   WBC 13.3* 13.8* 9.9   RBC 3.66* 3.96* 4.10*   HEMOGLOBIN 9.3* 10.3* 10.6*   HEMATOCRIT 30.5* 32.9* 33.8*   MCV 83.3 83.1 82.4   MCH 25.4* 26.0* 25.9*   RDW 55.1* 55.1* 53.7*   PLATELETCT 309 281 291   MPV 10.0 9.9 10.0   NEUTSPOLYS 78.50* 81.90* 76.80*   LYMPHOCYTES 12.40* 10.00* 14.00*   MONOCYTES 7.10 6.00 6.70   EOSINOPHILS 0.10 0.00 0.00   BASOPHILS 0.30 0.10 0.20     Recent Labs     11/12/20  0500 11/13/20  0545 11/14/20  0420   SODIUM 132* 131* 134*   POTASSIUM 5.7* 5.1 4.5   CHLORIDE 102 98 96   CO2 19* 23 28   GLUCOSE 308* 270* 244*   BUN 33* 39* 41*     Recent Labs     11/12/20  0500 11/13/20  0545 11/14/20  0420   ALBUMIN 2.8* 2.9* 2.9*   TBILIRUBIN 0.5 0.4 0.4   ALKPHOSPHAT 67 63 66   TOTPROTEIN 5.7* 5.8* 5.7*   ALTSGPT 11 7 9   ASTSGOT 17 17 19   CREATININE 2.57* 2.84* 2.60*       Imaging:  Ct-abdomen-pelvis With    Addendum Date: 11/5/2020    Addendum: A prior noncontrast CT of the abdomen and pelvis from [facility] dated 10/19/2020 was made available for comparison. On the prior study a perisplenic/subcapsular fluid collection was seen however did not contain air at that time and there was no discrete evidence of communication with the transverse colon. Communication with splenic flexure of the colon and air within the collection are new from the prior study.    Result Date: 11/5/2020  11/3/2020 4:02 PM HISTORY/REASON FOR EXAM:  Abd pain, unspecified; IV contrast only. TECHNIQUE/EXAM DESCRIPTION:   CT scan of the abdomen and pelvis with contrast. Contrast-enhanced helical scanning was obtained from  the diaphragmatic domes through the pubic symphysis following the bolus administration of nonionic contrast without complication. 100 mL of Omnipaque 350 nonionic contrast was administered without complication. Low dose optimization technique was utilized for this CT exam including automated exposure control and adjustment of the mA and/or kV according to patient size. COMPARISON: 9/26/2018 FINDINGS: Chest Base: Small left pleural effusion with mild bibasilar atelectasis. Heart is mildly enlarged. Liver:  Diffuse hypoattenuation of the liver suggesting fatty infiltration. Moderately enlarged. Gallbladder: Cholelithiasis. Biliary tract: Nondilated. Pancreas: Normal. Spleen: There is a rim-enhancing perisplenic fluid collection which contains air and which communicates with the splenic flexure of the colon. This most likely represents an abscess and measures about 8.7 x 7.9 cm. The anterior aspect of the spleen is nonenhancing with irregular margins could be related to infection of splenic parenchyma or infarction. Adrenals: Normal. Kidneys and Collecting Systems:  There is mild right renal cortical scarring. No hydronephrosis. No renal mass. Gastrointestinal tract:  No bowel obstruction. The appendix is normal. Peritoneum: No free air or free fluid. Reproductive organs:  Normal. Bladder:  Normal. Vessels:  Normal caliber. Lymph  Nodes:  No lymphadenopathy. Abdominal wall: Within normal limits. Bones:  Age indeterminate possibly subacute to chronic T11 and T12 superior endplate compression fractures.     1.  Peripherally enhancing perisplenic fluid collection which communicates with the splenic flexure of the colon and which contains foci of air is most consistent with an abscess/infected fluid collection. There is nonenhancement of the anterior aspect of the spleen suggesting infection/infarction. Surgical consultation is recommended. 2.  Hepatomegaly and hepatic steatosis. 3.  Cholelithiasis. 4.  Right renal  cortical scarring. No hydronephrosis. 5.  Small left pleural effusion. Mild bibasilar atelectasis. 6.  Subacute to chronic appearing T11 and T12 superior endplate compression fractures. These findings were discussed with SVETLANA PAREDES on 11/3/2020 4:49 PM.     Ct-drain-peritoneal    Result Date: 11/4/2020 11/4/2020 5:19 PM HISTORY/REASON FOR EXAM:  Joya Splenic Fluid Collection TECHNIQUE/EXAM DESCRIPTION: LUQ parasplenic abscess drainage with CT guidance. Low dose optimization technique was utilized for this CT exam including automated exposure control and adjustment of the mA and/or kV according to patient size. PROCEDURE: Informed consent was obtained. SEDATION: Moderate sedation was provided. Pulse oximetry and continuous cardiac monitoring by the nurse was performed throughout the exam. Intraservice time was 30 minutes. Localizing CT images were obtained with the patient in supine position. The skin was prepped with Betadine and draped in a sterile fashion. Following local anesthesia with 1% Lidocaine, a 17 G guiding needle was placed and needle path confirmed with CT. An Amplatz guidewire was placed and following serial tract dilatation, a 10 Bengali pigtail locking catheter was placed. A specimen was collected and submitted for culture and sensitivity and Gram stain. Total of 6 mL of Brownish fluid was drained. The catheter was secured to the skin and connected to suction bulb drainage. Final CT images were obtained documenting catheter position. The patient tolerated the procedure well with no evidence of complication. COMPARISON: CT scan abdomen and pelvis 11/3/2020 FINDINGS: The final CT images show satisfactory catheter position within the target collection.     1.  CT guided perisplenic abscess catheter drainage. 2.  The current plan is to obtain a follow-up CT in 5-7 days..    Dx-chest-portable (1 View)    Result Date: 11/3/2020  11/3/2020 3:41 PM HISTORY/REASON FOR EXAM:  Chest Pain. TECHNIQUE/EXAM  "DESCRIPTION AND NUMBER OF VIEWS: Single portable view of the chest. COMPARISON: September 10, 2020 FINDINGS: The cardiac size is enlarged. There is interstitial prominence. Lung aeration has improved. No pneumothorax. No obvious pleural effusion.     Slight interval interval improvement in congestive heart failure.      Micro:  Results     Procedure Component Value Units Date/Time    CULTURE WOUND W/ GRAM STAIN [[id number]]  (Abnormal)  (Susceptibility) Collected: 11/10/20 1039    Order Status: Completed Specimen: Wound Updated: 11/12/20 1536     Significant Indicator POS     Source WND     Site COLON PERFORATION     Culture Result -     Gram Stain Result Rare WBCs.  Rare Gram positive cocci.  Rare Gram positive rods.       Culture Result Enterococcus faecalis  Light growth        Candida albicans  Light growth      Susceptibility     Enterococcus faecalis (1)     Antibiotic Interpretation Microscan Method Status    Daptomycin Sensitive 4 mcg/mL ISAMAR Final    Ampicillin Sensitive <=2 mcg/mL ISAMAR Final    Gent Synergy Sensitive <=500 mcg/mL ISAMAR Final    Vancomycin Sensitive 1 mcg/mL ISAMAR Final    Penicillin Sensitive 8 mcg/mL ISAMAR Final                   Anaerobic Culture [[id number]] Collected: 11/10/20 1039    Order Status: Completed Specimen: Wound Updated: 11/12/20 1536     Significant Indicator NEG     Source WND     Site COLON PERFORATION     Culture Result Culture in progress.    BLOOD CULTURE [[id number]] Collected: 11/05/20 1950    Order Status: Completed Specimen: Blood from Peripheral Updated: 11/10/20 2100     Significant Indicator NEG     Source BLD     Site PERIPHERAL     Culture Result No growth after 5 days of incubation.    Narrative:      Per Hospital Policy: Only change Specimen Src: to \"Line\" if  specified by physician order.  Right AC    BLOOD CULTURE [[id number]] Collected: 11/05/20 1950    Order Status: Completed Specimen: Blood from Peripheral Updated: 11/10/20 2100     Significant Indicator NEG     " "Source BLD     Site PERIPHERAL     Culture Result No growth after 5 days of incubation.    Narrative:      Per Hospital Policy: Only change Specimen Src: to \"Line\" if  specified by physician order.  Right Hand    GRAM STAIN [[id number]] Collected: 11/10/20 1039    Order Status: Completed Specimen: Wound Updated: 11/10/20 1739     Significant Indicator .     Source WND     Site COLON PERFORATION     Gram Stain Result Rare WBCs.  Rare Gram positive cocci.  Rare Gram positive rods.      SARS-CoV-2, PCR (In-House) [[id number]] Collected: 11/09/20 1855    Order Status: Completed Updated: 11/09/20 2252     SARS-CoV-2 Source NP Swab     SARS-CoV-2 by PCR NotDetected     Comment: [facility] providers: PLEASE REFER TO DE-ESCALATION AND RETESTING PROTOCOL  on [email or web address]  **The Amorcyte COVID-19 SARS-CoV-2 test has been made available for use under  the Emergency Use Authorization (EUA) only.         Narrative:      Collected By:[id number] ZULY MOURA  Is patient being admitted?->Yes  Does this patient meet criteria for Rush/Cepheid per [facility]  Inpatient Workflow? (See workflow link below)->Yes  Expected turn around time?->Rush (Cepheid 2-4 hours)  Is this the patients First SARS CoV-2 test?->No  Is this patient employed in healthcare?->No  Is the patient symptomatic as defined by the CDC?->No  Is the patient hospitalized?->Yes  Is the patient in the ICU?->No  Is the patient a resident in a congregate care setting?->No  Is the patient pregnant?->No    COVID/SARS CoV-2 PCR [[id number]] Collected: 11/09/20 1855    Order Status: Completed Specimen: Respirate from Nasopharyngeal Updated: 11/09/20 1907     COVID Order Status Received     Comment: The order for SARS CoV-2 testing has been received by the  Laboratory. This result is neither positive nor negative.  Final results of testing will report in 24-48 hours, separately.         Narrative:      Collected By:[id number] ZULY MOURA  Is patient being " "admitted?->Yes  Does this patient meet criteria for Rush/Cepheid per [facility]  Inpatient Workflow? (See workflow link below)->Yes  Expected turn around time?->Rush (Cepheid 2-4 hours)  Is this the patients First SARS CoV-2 test?->No  Is this patient employed in healthcare?->No  Is the patient symptomatic as defined by the CDC?->No  Is the patient hospitalized?->Yes  Is the patient in the ICU?->No  Is the patient a resident in a congregate care setting?->No  Is the patient pregnant?->No    BLOOD CULTURE [[id number]] Collected: 11/03/20 1626    Order Status: Completed Specimen: Blood from Peripheral Updated: 11/08/20 1900     Significant Indicator NEG     Source BLD     Site PERIPHERAL     Culture Result No growth after 5 days of incubation.    Narrative:      Per Hospital Policy: Only change Specimen Src: to \"Line\" if  specified by physician order.  No site indicated    Aerobic/Anaerobic Culture (Surgery) [[id number]] Collected: 11/04/20 1730    Order Status: Completed Specimen: Wound from Peritoneal Fluid Updated: 11/08/20 0829     Significant Indicator NEG     Source WND     Site Peritoneal Drain     Culture Result Order placed in Surgery. Source appropriate culture orders  have been placed in Micro for testing.      Narrative:      Special Contact [id number] MAXINE Stevenson fluid from peritoneal drain  Special Contact [id number] MAXINE ELLIS    Anaerobic Culture [[id number]] Collected: 11/04/20 1730    Order Status: Completed Specimen: Wound Updated: 11/08/20 0829     Significant Indicator NEG     Source WND     Site Peritoneal Drain     Culture Result No Anaerobes isolated.    Narrative:      Special Contact [id number] MAXINELIZZY ELLIS  Brown fluid from peritoneal drain  Special Contact [id number] MAXINE GRANT RHONDA    CULTURE WOUND W/ GRAM STAIN [[id number]]  (Abnormal)  (Susceptibility) Collected: 11/04/20 1730    Order Status: Completed Specimen: Wound Updated: 11/08/20 0829     Significant " Indicator POS     Source WND     Site Peritoneal Drain     Culture Result -     Gram Stain Result Rare WBCs.  Few Gram positive cocci.  Few Yeast.       Culture Result Enterococcus faecalis  Heavy growth        Candida albicans  Moderate growth      Narrative:      Special Contact [id number] MAXINE ELLIS  Brown fluid from peritoneal drain  Special Contact [id number] MAXINE ELLIS    Susceptibility     Enterococcus faecalis (1)     Antibiotic Interpretation Microscan Method Status    Daptomycin Sensitive 4 mcg/mL ISAMAR Final    Ampicillin Sensitive <=2 mcg/mL ISAMAR Final    Gent Synergy Sensitive <=500 mcg/mL ISAMAR Final    Vancomycin Sensitive 1 mcg/mL ISAMAR Final    Penicillin Sensitive 2 mcg/mL ISAMAR Final                   CULTURE STOOL [[id number]] Collected: 11/04/20 1609    Order Status: Completed Specimen: Stool Updated: 11/07/20 1608     Significant Indicator NEG     Source STL     Site STOOL     Culture Result Shiga Toxin testing not performed due to lack of growth in  [place] broth.  No enteric pathogens, only usual Gram positive enteric  joselin isolated.  NOTE:  Stool cultures are screened for Shiga Toxins 1 and 2,  Salmonella, Shigella, Campylobacter, Aeromonas,  Plesiomonas, and Vibrio.      Narrative:      Special Contact [id number] [facility]  Does this patient have risk factors for C-diff?->Yes  Special Contact [id number] [facility]           ASSESSMENT/PLAN:     Hospital Course:   CT scan on 11/3 revealed a peripherally enhancing perisplenic fluid collection measuring 8.7 x 7.9 cm, communicating with the splenic flexure of the colon and contained foci of air consistent with abscess/infected fluid.  Also noted nonenhancing of the anterior aspect of the spleen suggesting infection/infarction.    Status post surgery on 11/10/2020    Postop hypotension, resolved  Leukocytosis improved  Weaned off pressors  On antibiotics below    Acute kidney injury  Renally dose abx  accordingly  Avoid nephrotoxins  Renal ultrasound-unremarkable  Monitor  Nephrology following    Bacteremia  -Blood cultures on 11/31/2 + viridans streptococci, possible contaminant  -Blood culture on 11/5 with no growth to date    Perisplenic abscess with possible communication to the bowel  Left upper quadrant abscess  Afebrile  Splenic enhancement on CT, suggesting infection or infarction  -Drainage on 11/4 and peritoneal fluid Enterococcus faecalis, ampicillin sensitive and Calbicans  Recent ground-level fall located by a subcapsular splenic hematoma  Continue zyvox and fluconazole  Monitor CBC while on linezolid-platelets stable  Discontinued cefepime and Flagyl on 11/13  Status post segmental colectomy, colostomy, irrigation and debridement of flank wound and wound VAC placement on 11/10/2020 by Dr. Desouza.  Patient found to have a left upper quadrant abscess inferior to the spleen with a large bowel wall defect.  Also noted to have an abdominal wall abscess.  Operative cultures (colon perforation) -ampicillin sensitive Enterococcus faecalis and Candida albicans.  Gram stain+ GPC, GPR  Follow drain output-significant drain output overnight, greater than 1000 cc  Repeat CT scan when renal function improves    Type II diabetes mellitus  Keep BS under 150 to help control current infection    A. Fib  Contributing to hypotension  Cards following     Diarrhea  -C. difficile tested negative on 11/4    Antibiotic allergies: Daptomycin rash, penicillins rash and hives, tolerates cephalosporins-discussed allergies and he states that he has developed significant rash and hives with penicillins several times, most recently a year and a half ago- will avoid penicillins     I have performed a physical exam and reviewed and updated ROS and plan today 11/14/2020.  In review of yesterday's note 11/13/2020, there are no changes except as documented above.      Plan of care discussed with RN.    Addendum:  Notified by  pharmacist that QTc interval on EKG on 11/11 prolonged at 527.  Will repeat EKG today.  If remains prolonged, DC fluconazole and change to micafungin.

## 2020-11-14 NOTE — PROGRESS NOTES
Nephrology Daily Progress Note    Date of Service  11/14/2020    Chief Complaint  55 y.o. male admitted 11/3/2020 with sepsis, perforated colon, developed SRUTHI    Interval Problem Update  Pt is doing better today, no CP, no SOB, UO is increasing slowly  11/14 pt is doing better, UO is improving  Review of Systems  Review of Systems   Constitutional: Negative for chills, fever and malaise/fatigue.   Respiratory: Negative for cough and shortness of breath.    Cardiovascular: Negative for chest pain and leg swelling.   Gastrointestinal: Negative for nausea and vomiting.   Genitourinary: Negative for dysuria, frequency and urgency.   All other systems reviewed and are negative.       Physical Exam  Temp:  [35.8 °C (96.4 °F)-36.6 °C (97.9 °F)] 36.6 °C (97.9 °F)  Pulse:  [] 98  Resp:  [11-31] 20  BP: (113-156)/(67-99) 156/99  SpO2:  [96 %-99 %] 96 %    Physical Exam  Vitals signs and nursing note reviewed.   Constitutional:       General: He is awake.      Appearance: He is ill-appearing.   HENT:      Head: Normocephalic and atraumatic.      Right Ear: External ear normal.      Left Ear: External ear normal.      Nose: Nose normal.      Mouth/Throat:      Pharynx: No oropharyngeal exudate or posterior oropharyngeal erythema.   Eyes:      General:         Right eye: No discharge.         Left eye: No discharge.      Conjunctiva/sclera: Conjunctivae normal.   Neck:      Musculoskeletal: No neck rigidity or muscular tenderness.   Cardiovascular:      Rate and Rhythm: Normal rate and regular rhythm.   Pulmonary:      Effort: Pulmonary effort is normal. No respiratory distress.      Breath sounds: Normal breath sounds. No wheezing.      Comments: Coarse BS  Abdominal:      General: Abdomen is flat. Bowel sounds are normal.   Musculoskeletal:         General: No tenderness.      Right lower leg: Edema present.      Left lower leg: Edema present.   Skin:     General: Skin is warm and dry.      Coloration: Skin is not  jaundiced.   Neurological:      Mental Status: He is alert.   Psychiatric:         Mood and Affect: Mood normal.         Behavior: Behavior normal.         Thought Content: Thought content normal.         Fluids    Intake/Output Summary (Last 24 hours) at 11/14/2020 1351  Last data filed at 11/14/2020 1100  Gross per 24 hour   Intake 3131.67 ml   Output 2090 ml   Net 1041.67 ml       Laboratory  Recent Labs     11/12/20  0500 11/13/20  0545 11/14/20  0420   WBC 13.3* 13.8* 9.9   RBC 3.66* 3.96* 4.10*   HEMOGLOBIN 9.3* 10.3* 10.6*   HEMATOCRIT 30.5* 32.9* 33.8*   MCV 83.3 83.1 82.4   MCH 25.4* 26.0* 25.9*   MCHC 30.5* 31.3* 31.4*   RDW 55.1* 55.1* 53.7*   PLATELETCT 309 281 291   MPV 10.0 9.9 10.0     Recent Labs     11/12/20  0500 11/13/20  0545 11/14/20  0420   SODIUM 132* 131* 134*   POTASSIUM 5.7* 5.1 4.5   CHLORIDE 102 98 96   CO2 19* 23 28   GLUCOSE 308* 270* 244*   BUN 33* 39* 41*   CREATININE 2.57* 2.84* 2.60*   CALCIUM 8.3* 8.1* 8.1*         No results for input(s): NTPROBNP in the last 72 hours.        Imaging  US-RENAL   Final Result    Examination was limited due to poor sound penetration related to body habitus.   Grossly normal appearance of the kidneys.      EC-ECHOCARDIOGRAM COMPLETE W/ CONT   Final Result      DX-CHEST-PORTABLE (1 VIEW)   Final Result      No pneumothorax identified following right subclavian line placement      Worsening atelectasis with consolidation at the bases not excluded      Stable cardiac silhouette enlargement      CT-ABDOMEN-PELVIS WITH   Final Result      Interval decrease in size of perisplenic abscess. Pigtail catheter now in place      Abscess has likely fistulous communication with the abutting splenic flexure of the colon. Colon is otherwise normal in appearance      Mild hepatosplenomegaly      Decreasing small left pleural effusion      Subacute anterior wedge compression fractures in the caudal thoracic spine      CT-DRAIN-PERITONEAL   Final Result      1.  CT  guided perisplenic abscess catheter drainage.   2.  The current plan is to obtain a follow-up CT in 5-7 days..      OUTSIDE IMAGES-CT CHEST   Final Result      OUTSIDE IMAGES-CT ABDOMEN /PELVIS   Final Result      CT-ABDOMEN-PELVIS WITH   Final Result   Addendum 1 of 1   Addendum:   A prior noncontrast CT of the abdomen and pelvis from [facility] dated 10/19/2020 was made available for comparison. On the    prior study a perisplenic/subcapsular fluid collection was seen however    did not contain air at that time and    there was no discrete evidence of communication with the transverse colon.    Communication with splenic flexure of the colon and air within the    collection are new from the prior study.      Final      DX-CHEST-PORTABLE (1 VIEW)   Final Result      Slight interval interval improvement in congestive heart failure.            Assessment/Plan  1 SRUTHI: sec to ATN, UO is increasing  2 Hyperkalemia:better  3 Metabolic acidosis  4 Anemia  Plan  no need for HD  Continue IVF  Renal diet  Daily labs  Renal dose all meds  Avoid nephrotoxins  Prognosis guarded.  D/W Dr Apodaca

## 2020-11-15 LAB
ALBUMIN SERPL BCP-MCNC: 2.7 G/DL (ref 3.2–4.9)
ALBUMIN/GLOB SERPL: 1 G/DL
ALP SERPL-CCNC: 70 U/L (ref 30–99)
ALT SERPL-CCNC: 8 U/L (ref 2–50)
ANION GAP SERPL CALC-SCNC: 8 MMOL/L (ref 7–16)
AST SERPL-CCNC: 22 U/L (ref 12–45)
BASOPHILS # BLD AUTO: 0.2 % (ref 0–1.8)
BASOPHILS # BLD: 0.02 K/UL (ref 0–0.12)
BILIRUB SERPL-MCNC: 0.5 MG/DL (ref 0.1–1.5)
BUN SERPL-MCNC: 38 MG/DL (ref 8–22)
CALCIUM SERPL-MCNC: 7.8 MG/DL (ref 8.5–10.5)
CHLORIDE SERPL-SCNC: 99 MMOL/L (ref 96–112)
CO2 SERPL-SCNC: 29 MMOL/L (ref 20–33)
CREAT SERPL-MCNC: 2.1 MG/DL (ref 0.5–1.4)
EOSINOPHIL # BLD AUTO: 0 K/UL (ref 0–0.51)
EOSINOPHIL NFR BLD: 0 % (ref 0–6.9)
ERYTHROCYTE [DISTWIDTH] IN BLOOD BY AUTOMATED COUNT: 54.1 FL (ref 35.9–50)
GLOBULIN SER CALC-MCNC: 2.7 G/DL (ref 1.9–3.5)
GLUCOSE BLD-MCNC: 205 MG/DL (ref 65–99)
GLUCOSE BLD-MCNC: 208 MG/DL (ref 65–99)
GLUCOSE BLD-MCNC: 214 MG/DL (ref 65–99)
GLUCOSE BLD-MCNC: 227 MG/DL (ref 65–99)
GLUCOSE SERPL-MCNC: 226 MG/DL (ref 65–99)
HCT VFR BLD AUTO: 34.2 % (ref 42–52)
HGB BLD-MCNC: 10.6 G/DL (ref 14–18)
IMM GRANULOCYTES # BLD AUTO: 0.22 K/UL (ref 0–0.11)
IMM GRANULOCYTES NFR BLD AUTO: 2.4 % (ref 0–0.9)
LYMPHOCYTES # BLD AUTO: 1.22 K/UL (ref 1–4.8)
LYMPHOCYTES NFR BLD: 13.5 % (ref 22–41)
MAGNESIUM SERPL-MCNC: 1.5 MG/DL (ref 1.5–2.5)
MCH RBC QN AUTO: 25.7 PG (ref 27–33)
MCHC RBC AUTO-ENTMCNC: 31 G/DL (ref 33.7–35.3)
MCV RBC AUTO: 82.8 FL (ref 81.4–97.8)
MONOCYTES # BLD AUTO: 0.64 K/UL (ref 0–0.85)
MONOCYTES NFR BLD AUTO: 7.1 % (ref 0–13.4)
NEUTROPHILS # BLD AUTO: 6.95 K/UL (ref 1.82–7.42)
NEUTROPHILS NFR BLD: 76.8 % (ref 44–72)
NRBC # BLD AUTO: 0.07 K/UL
NRBC BLD-RTO: 0.8 /100 WBC
PLATELET # BLD AUTO: 288 K/UL (ref 164–446)
PMV BLD AUTO: 9.9 FL (ref 9–12.9)
POTASSIUM SERPL-SCNC: 4.1 MMOL/L (ref 3.6–5.5)
PROT SERPL-MCNC: 5.4 G/DL (ref 6–8.2)
RBC # BLD AUTO: 4.13 M/UL (ref 4.7–6.1)
SODIUM SERPL-SCNC: 136 MMOL/L (ref 135–145)
WBC # BLD AUTO: 9.1 K/UL (ref 4.8–10.8)

## 2020-11-15 PROCEDURE — 83735 ASSAY OF MAGNESIUM: CPT

## 2020-11-15 PROCEDURE — 700111 HCHG RX REV CODE 636 W/ 250 OVERRIDE (IP): Performed by: SURGERY

## 2020-11-15 PROCEDURE — 700111 HCHG RX REV CODE 636 W/ 250 OVERRIDE (IP): Performed by: NURSE PRACTITIONER

## 2020-11-15 PROCEDURE — 99233 SBSQ HOSP IP/OBS HIGH 50: CPT | Performed by: SURGERY

## 2020-11-15 PROCEDURE — 700111 HCHG RX REV CODE 636 W/ 250 OVERRIDE (IP): Performed by: FAMILY MEDICINE

## 2020-11-15 PROCEDURE — A9270 NON-COVERED ITEM OR SERVICE: HCPCS | Performed by: INTERNAL MEDICINE

## 2020-11-15 PROCEDURE — 80053 COMPREHEN METABOLIC PANEL: CPT

## 2020-11-15 PROCEDURE — A9270 NON-COVERED ITEM OR SERVICE: HCPCS | Performed by: SURGERY

## 2020-11-15 PROCEDURE — 99232 SBSQ HOSP IP/OBS MODERATE 35: CPT | Performed by: INTERNAL MEDICINE

## 2020-11-15 PROCEDURE — 700102 HCHG RX REV CODE 250 W/ 637 OVERRIDE(OP): Performed by: STUDENT IN AN ORGANIZED HEALTH CARE EDUCATION/TRAINING PROGRAM

## 2020-11-15 PROCEDURE — 700102 HCHG RX REV CODE 250 W/ 637 OVERRIDE(OP): Performed by: SURGERY

## 2020-11-15 PROCEDURE — 85025 COMPLETE CBC W/AUTO DIFF WBC: CPT

## 2020-11-15 PROCEDURE — 770001 HCHG ROOM/CARE - MED/SURG/GYN PRIV*

## 2020-11-15 PROCEDURE — A9270 NON-COVERED ITEM OR SERVICE: HCPCS | Performed by: STUDENT IN AN ORGANIZED HEALTH CARE EDUCATION/TRAINING PROGRAM

## 2020-11-15 PROCEDURE — 82962 GLUCOSE BLOOD TEST: CPT | Mod: 91

## 2020-11-15 PROCEDURE — 700102 HCHG RX REV CODE 250 W/ 637 OVERRIDE(OP): Performed by: INTERNAL MEDICINE

## 2020-11-15 RX ORDER — MAGNESIUM SULFATE HEPTAHYDRATE 40 MG/ML
2 INJECTION, SOLUTION INTRAVENOUS ONCE
Status: COMPLETED | OUTPATIENT
Start: 2020-11-15 | End: 2020-11-15

## 2020-11-15 RX ADMIN — OLANZAPINE 7.5 MG: 5 TABLET, FILM COATED ORAL at 21:09

## 2020-11-15 RX ADMIN — INSULIN HUMAN 4 UNITS: 100 INJECTION, SOLUTION PARENTERAL at 17:33

## 2020-11-15 RX ADMIN — HEPARIN SODIUM 5000 UNITS: 5000 INJECTION, SOLUTION INTRAVENOUS; SUBCUTANEOUS at 05:43

## 2020-11-15 RX ADMIN — INSULIN HUMAN 4 UNITS: 100 INJECTION, SOLUTION PARENTERAL at 05:39

## 2020-11-15 RX ADMIN — HYDROCORTISONE 50 MG: 10 TABLET ORAL at 05:43

## 2020-11-15 RX ADMIN — ONDANSETRON 4 MG: 2 INJECTION INTRAMUSCULAR; INTRAVENOUS at 05:34

## 2020-11-15 RX ADMIN — SERTRALINE 125 MG: 100 TABLET, FILM COATED ORAL at 05:45

## 2020-11-15 RX ADMIN — FENTANYL CITRATE 50 MCG: 50 INJECTION, SOLUTION INTRAMUSCULAR; INTRAVENOUS at 05:34

## 2020-11-15 RX ADMIN — HEPARIN SODIUM 5000 UNITS: 5000 INJECTION, SOLUTION INTRAVENOUS; SUBCUTANEOUS at 21:09

## 2020-11-15 RX ADMIN — LINEZOLID 600 MG: 600 TABLET, FILM COATED ORAL at 17:25

## 2020-11-15 RX ADMIN — FENTANYL CITRATE 50 MCG: 50 INJECTION, SOLUTION INTRAMUSCULAR; INTRAVENOUS at 17:25

## 2020-11-15 RX ADMIN — INSULIN GLARGINE 10 UNITS: 100 INJECTION, SOLUTION SUBCUTANEOUS at 05:38

## 2020-11-15 RX ADMIN — MAGNESIUM SULFATE 2 G: 2 INJECTION INTRAVENOUS at 13:09

## 2020-11-15 RX ADMIN — DIGOXIN 125 MCG: 0.25 INJECTION INTRAMUSCULAR; INTRAVENOUS at 17:25

## 2020-11-15 RX ADMIN — LINEZOLID 600 MG: 600 TABLET, FILM COATED ORAL at 05:43

## 2020-11-15 RX ADMIN — HEPARIN SODIUM 5000 UNITS: 5000 INJECTION, SOLUTION INTRAVENOUS; SUBCUTANEOUS at 12:37

## 2020-11-15 RX ADMIN — FLUCONAZOLE 400 MG: 200 TABLET ORAL at 05:42

## 2020-11-15 RX ADMIN — FENTANYL CITRATE 25 MCG: 50 INJECTION, SOLUTION INTRAMUSCULAR; INTRAVENOUS at 21:20

## 2020-11-15 RX ADMIN — FENTANYL CITRATE 50 MCG: 50 INJECTION, SOLUTION INTRAMUSCULAR; INTRAVENOUS at 13:31

## 2020-11-15 RX ADMIN — INSULIN HUMAN 4 UNITS: 100 INJECTION, SOLUTION PARENTERAL at 12:38

## 2020-11-15 RX ADMIN — INSULIN HUMAN 4 UNITS: 100 INJECTION, SOLUTION PARENTERAL at 23:49

## 2020-11-15 ASSESSMENT — PAIN DESCRIPTION - PAIN TYPE
TYPE: ACUTE PAIN

## 2020-11-15 ASSESSMENT — ENCOUNTER SYMPTOMS
ABDOMINAL PAIN: 1
COUGH: 0
VOMITING: 0
NAUSEA: 0
CHILLS: 0
NAUSEA: 1
FEVER: 0
SHORTNESS OF BREATH: 0
HEADACHES: 0
MYALGIAS: 0

## 2020-11-15 NOTE — PROGRESS NOTES
Per Dr. Najjar, ok to place midline or PICC on patient now that his kidney function has recovered. Awaiting confirmation from primary team for ok to discontinue central line.

## 2020-11-15 NOTE — PROGRESS NOTES
Trauma / Surgical Daily Progress Note    Date of Service  11/15/2020    Chief Complaint  55 y.o. male admitted 11/3/2020 with Intraabdominal fluid collection    Interval Events  Tolerating full liquid diet  Continued renal recovery    - Mobilize  - Transfer to [place]     Review of Systems  Review of Systems   Constitutional: Negative for chills and fever.   Respiratory: Negative for cough.    Cardiovascular: Negative for chest pain.   Gastrointestinal: Positive for abdominal pain (incisional). Negative for nausea and vomiting.   Musculoskeletal: Negative for myalgias.   Neurological: Negative for headaches.        Vital Signs  Temp:  [36.1 °C (96.9 °F)-36.8 °C (98.2 °F)] 36.8 °C (98.2 °F)  Pulse:  [] 82  Resp:  [13-31] 18  BP: (124-154)/(62-96) 147/96  SpO2:  [95 %-97 %] 97 %    Physical Exam  Physical Exam  Vitals signs and nursing note reviewed.   Constitutional:       General: He is not in acute distress.     Appearance: He is obese.   HENT:      Head: Normocephalic.      Nose: Nose normal.      Mouth/Throat:      Mouth: Mucous membranes are moist.      Pharynx: Oropharynx is clear.   Eyes:      Extraocular Movements: Extraocular movements intact.      Conjunctiva/sclera: Conjunctivae normal.   Neck:      Musculoskeletal: Neck supple.   Cardiovascular:      Rate and Rhythm: Normal rate. Rhythm irregular.      Pulses: Normal pulses.      Heart sounds: Normal heart sounds.   Pulmonary:      Effort: Pulmonary effort is normal. No respiratory distress.   Chest:      Chest wall: No tenderness.   Abdominal:      Tenderness: There is abdominal tenderness.      Comments: Stoma viable with noted output  Midline incision with wound VAC in place   RLQ BETTE drain in place with serosanguineous drainage    Genitourinary:     Comments: Zabala in place with yellow urine  Musculoskeletal:      Right lower leg: Edema present.      Left lower leg: Edema present.      Comments: Moves all extremities   Skin:     General: Skin is  warm and dry.      Capillary Refill: Capillary refill takes less than 2 seconds.   Neurological:      Mental Status: He is alert and oriented to person, place, and time.   Psychiatric:         Behavior: Behavior normal.         Laboratory  Recent Results (from the past 24 hour(s))   EKG    Collection Time: 20 11:36 AM   Result Value Ref Range    Report       [facility]    Test Date:  2020  Pt Name:    EMELY FLORES                Department: 19  MRN:        [id number]                      Room:       [facility]  Gender:     Male                         Technician: MACIEJ  :        1965                   Requested By:BIGG KEBEDE  Order #:    [id number]                    Reading MD: Wicho Camacho MD    Measurements  Intervals                                Axis  Rate:       101                          P:  VA:                                      QRS:        15  QRSD:       98                           T:          50  QT:         344  QTc:        446    Interpretive Statements  ATRIAL FIBRILLATION  LOW VOLTAGE IN FRONTAL LEADS  BORDERLINE R WAVE PROGRESSION, ANTERIOR LEADS  Compared to ECG 2020 06:15:52  Low QRS voltage now present  Left anterior fascicular block no longer present  Intraventricular conduction delay no longer present  Electronically Signed On 2020 12:25:02 PST by Wicho Camacho MD     ACCU-CHEK GLUCOSE    Collection Time: 20  1:01 PM   Result Value Ref Range    Glucose - Accu-Ck 238 (H) 65 - 99 mg/dL   ACCU-CHEK GLUCOSE    Collection Time: 20  4:57 PM   Result Value Ref Range    Glucose - Accu-Ck 249 (H) 65 - 99 mg/dL   ACCU-CHEK GLUCOSE    Collection Time: 20 11:54 PM   Result Value Ref Range    Glucose - Accu-Ck 208 (H) 65 - 99 mg/dL   Comp Metabolic Panel    Collection Time: 11/15/20  4:15 AM   Result Value Ref Range    Sodium 136 135 - 145 mmol/L    Potassium 4.1 3.6 - 5.5 mmol/L    Chloride 99 96 - 112 mmol/L    Co2 29 20 - 33 mmol/L    Anion Gap  8.0 7.0 - 16.0    Glucose 226 (H) 65 - 99 mg/dL    Bun 38 (H) 8 - 22 mg/dL    Creatinine 2.10 (H) 0.50 - 1.40 mg/dL    Calcium 7.8 (L) 8.5 - 10.5 mg/dL    AST(SGOT) 22 12 - 45 U/L    ALT(SGPT) 8 2 - 50 U/L    Alkaline Phosphatase 70 30 - 99 U/L    Total Bilirubin 0.5 0.1 - 1.5 mg/dL    Albumin 2.7 (L) 3.2 - 4.9 g/dL    Total Protein 5.4 (L) 6.0 - 8.2 g/dL    Globulin 2.7 1.9 - 3.5 g/dL    A-G Ratio 1.0 g/dL   CBC WITH DIFFERENTIAL    Collection Time: 11/15/20  4:15 AM   Result Value Ref Range    WBC 9.1 4.8 - 10.8 K/uL    RBC 4.13 (L) 4.70 - 6.10 M/uL    Hemoglobin 10.6 (L) 14.0 - 18.0 g/dL    Hematocrit 34.2 (L) 42.0 - 52.0 %    MCV 82.8 81.4 - 97.8 fL    MCH 25.7 (L) 27.0 - 33.0 pg    MCHC 31.0 (L) 33.7 - 35.3 g/dL    RDW 54.1 (H) 35.9 - 50.0 fL    Platelet Count 288 164 - 446 K/uL    MPV 9.9 9.0 - 12.9 fL    Neutrophils-Polys 76.80 (H) 44.00 - 72.00 %    Lymphocytes 13.50 (L) 22.00 - 41.00 %    Monocytes 7.10 0.00 - 13.40 %    Eosinophils 0.00 0.00 - 6.90 %    Basophils 0.20 0.00 - 1.80 %    Immature Granulocytes 2.40 (H) 0.00 - 0.90 %    Nucleated RBC 0.80 /100 WBC    Neutrophils (Absolute) 6.95 1.82 - 7.42 K/uL    Lymphs (Absolute) 1.22 1.00 - 4.80 K/uL    Monos (Absolute) 0.64 0.00 - 0.85 K/uL    Eos (Absolute) 0.00 0.00 - 0.51 K/uL    Baso (Absolute) 0.02 0.00 - 0.12 K/uL    Immature Granulocytes (abs) 0.22 (H) 0.00 - 0.11 K/uL    NRBC (Absolute) 0.07 K/uL   MAGNESIUM    Collection Time: 11/15/20  4:15 AM   Result Value Ref Range    Magnesium 1.5 1.5 - 2.5 mg/dL   ESTIMATED GFR    Collection Time: 11/15/20  4:15 AM   Result Value Ref Range    GFR If  39 (A) >60 mL/min/1.73 m 2    GFR If Non  33 (A) >60 mL/min/1.73 m 2   ACCU-CHEK GLUCOSE    Collection Time: 11/15/20  5:38 AM   Result Value Ref Range    Glucose - Accu-Ck 214 (H) 65 - 99 mg/dL       Fluids    Intake/Output Summary (Last 24 hours) at 11/15/2020 1123  Last data filed at 11/15/2020 0600  Gross per 24 hour   Intake  1160 ml   Output 3410 ml   Net -2250 ml       Core Measures & Quality Metrics  Labs reviewed and Medications reviewed  Zabala catheter: Critically Ill - Requiring Accurate Measurement of Urinary Output      DVT Prophylaxis: Heparin            LEONOR Score  ETOH Screening    Assessment/Plan  SRUTHI (acute kidney injury) ([facility])  Assessment & Plan  Increasing azotemia   Relative oliguria   11/11 Hyperkalemia requiring insulin and kayexalate, Na Naco3 infusion   11/12 Continue volume expansion, avoid nephrotoxins, nephrology consult   11/13 Fluids changed to NS, no need for dialysis   Fadi Najjar, MD. Nephrology    Spontaneous perforation of colon ([facility])- (present on admission)  Assessment & Plan  Admitted with LUQ abscess and suspicion of perforation at splenic flexure  11/9 Drain with stool and unable to hold suction  11/10 Segmental colectomy, colostomy, Patti pouch, I & D of abdominal wall abscess  Kin Desouza DO General Surgery     Adrenal insufficiency ([facility])  Assessment & Plan  11/10 Cortisol 17 in stress state  Hydrocortisone   11/13 Weaned to 50 BID  11/15 Steroids stopped     LV dysfunction- (present on admission)  Assessment & Plan  BP low after abscess drainage  11/11 Echocardiogram completed with normal left ventricular chamber size.  Moderately reduced left ventricular systolic function.  Left ventricular ejection fraction is visually estimated to be 30-40%    Sepsis ([facility])  Assessment & Plan  11/3 Ceftriaxone and metronidazole initiated  11/5 Fluconazole initiated  11/7 Linezolid initiated  11/8 Drain with Candida albicans and Enterococcus faecalis  11/9 Ceftriaxone discontinued  11/10 Cefepime initiated  - Septic shock in the post operative period, vasopressors initiated   11/12 Weaned off vasopressors  11/13 Cefepime and flagyl discontinued  Antibiotic Management by [facility] physicians     Paroxysmal A-fib with RVR ([facility])- (present on admission)  Assessment & Plan  Recurrent    11/8  Cardiology consult  11/10 Amiodarone drip initiated   11/11 Added digoxin   11/12 Stopped amiodarone   11/14 Digoxin dose decreased   Sirena Larson MD.  Cardiology (sign off)        Discussed patient condition with RN, Patient and general surgery, Dr. Apodaca.    I saw and evaluated the patient and discussed his/her management with the trauma APRN. I reviewed the APRNs note and agree with the documented findings and plan of care. Blood sugars are still slightly high but improving. Will leave insulin regimen as is given that today is his last day of sepsis steroids. Overall improving.     Ras Apodaca MD  [phone]

## 2020-11-15 NOTE — PROGRESS NOTES
Care summary:  Patient pleasant with all needs met. Bathed today, all dressings changed including protective Mepilex. Buttocks remains friable, offloaded with q2 hr turns and on a bariatric bed. Patient able to use overhead trapeze to shift his upper extremities but is unable to make appreciable changes in position to his lowers and buttocks. Heel float boots added today, previously floated on pillows. Grey foam on silicone oxygent tubing. Deep skin fold on skin fold creases noted, will continue to monitor and offload with wedges, pillows and postioning. Diet advanced by Dr. Cruz.   Report called to [facility] RN. All belongings sent with patient, bag of clothing, phone, phone charge. Patient verbalized all belongings are accounted for.   Patient transported to U with transport services.

## 2020-11-15 NOTE — PROGRESS NOTES
Skin check: Patient bathed and shaved today. Old IR wound dressing changed and packed per order, Mepilex on sacrum and buttocks, who area is less moist, barrier paste applied to vj-anal region. Interdry under panus. Barrier paste under breasts. BETTE dressing changed, wound vac changed by wound RN and ostomy changed by wound RN. Grey foam on oxygen tubing when in use.

## 2020-11-15 NOTE — PROGRESS NOTES
Patient is an ICU transfer. Patient arrived to room via bariatric bed. Patient awake and oriented x4 and is denying any needs. IV Magnesium hanging. Patient very pleasant but fatigued.    2 RN Skin Check:  Redness noted to bilat ears with poor blanching noted. Foam pads on oxygen tubing.   Pink/blanching noted to bilat elbows. Bilat forearms are also pink/blanching.   Sacrum red and blanching. Preventative mepilex in place.  Redness to bilateral heels are slow to swetha.   Bilateral lower extremities dusky, calloused, and flaky.   Skin tear to left forearm from prior to arrival.   Scab to anterior right foot.   Amputated right great toe.    Midline incision noted with intact wound vac.  BETTE drain to abdomen is self compressed with sero sang drainage.  Colostomy is producing stool. Mild redness noted around appliance.

## 2020-11-15 NOTE — PROGRESS NOTES
Nephrology Daily Progress Note    Date of Service  11/15/2020    Chief Complaint  55 y.o. male admitted 11/3/2020 with sepsis, perforated colon, developed SRUTHI    Interval Problem Update  Pt is doing better today, no CP, no SOB, UO is increasing slowly  11/14 pt is doing better, UO is improving  11/15 pt is doing better    Review of Systems  Review of Systems   Constitutional: Negative for chills, fever and malaise/fatigue.   Respiratory: Negative for cough and shortness of breath.    Cardiovascular: Negative for chest pain and leg swelling.   Gastrointestinal: Negative for nausea and vomiting.   Genitourinary: Negative for dysuria, frequency and urgency.        Physical Exam  Temp:  [36.1 °C (96.9 °F)-36.8 °C (98.2 °F)] 36.6 °C (97.9 °F)  Pulse:  [] 73  Resp:  [13-38] 14  BP: (124-154)/(62-96) 144/82  SpO2:  [91 %-97 %] 95 %    Physical Exam  Vitals signs and nursing note reviewed.   Constitutional:       General: He is not in acute distress.     Appearance: He is not ill-appearing.   HENT:      Head: Normocephalic and atraumatic.      Right Ear: External ear normal.      Left Ear: External ear normal.      Nose: Nose normal.   Eyes:      General:         Right eye: No discharge.         Left eye: No discharge.      Conjunctiva/sclera: Conjunctivae normal.   Cardiovascular:      Rate and Rhythm: Normal rate and regular rhythm.      Heart sounds: No murmur.   Pulmonary:      Effort: Pulmonary effort is normal. No respiratory distress.      Breath sounds: Normal breath sounds. No wheezing.   Musculoskeletal:         General: No tenderness or deformity.      Right lower leg: Edema present.      Left lower leg: Edema present.   Skin:     General: Skin is warm.   Neurological:      General: No focal deficit present.      Mental Status: He is alert and oriented to person, place, and time.   Psychiatric:         Mood and Affect: Mood normal.         Behavior: Behavior normal.         Fluids    Intake/Output Summary  (Last 24 hours) at 11/15/2020 1306  Last data filed at 11/15/2020 1200  Gross per 24 hour   Intake 960 ml   Output 3700 ml   Net -2740 ml       Laboratory  Recent Labs     11/13/20  0545 11/14/20  0420 11/15/20  0415   WBC 13.8* 9.9 9.1   RBC 3.96* 4.10* 4.13*   HEMOGLOBIN 10.3* 10.6* 10.6*   HEMATOCRIT 32.9* 33.8* 34.2*   MCV 83.1 82.4 82.8   MCH 26.0* 25.9* 25.7*   MCHC 31.3* 31.4* 31.0*   RDW 55.1* 53.7* 54.1*   PLATELETCT 281 291 288   MPV 9.9 10.0 9.9     Recent Labs     11/13/20  0545 11/14/20  0420 11/15/20  0415   SODIUM 131* 134* 136   POTASSIUM 5.1 4.5 4.1   CHLORIDE 98 96 99   CO2 23 28 29   GLUCOSE 270* 244* 226*   BUN 39* 41* 38*   CREATININE 2.84* 2.60* 2.10*   CALCIUM 8.1* 8.1* 7.8*         No results for input(s): NTPROBNP in the last 72 hours.        Imaging  US-RENAL   Final Result    Examination was limited due to poor sound penetration related to body habitus.   Grossly normal appearance of the kidneys.      EC-ECHOCARDIOGRAM COMPLETE W/ CONT   Final Result      DX-CHEST-PORTABLE (1 VIEW)   Final Result      No pneumothorax identified following right subclavian line placement      Worsening atelectasis with consolidation at the bases not excluded      Stable cardiac silhouette enlargement      CT-ABDOMEN-PELVIS WITH   Final Result      Interval decrease in size of perisplenic abscess. Pigtail catheter now in place      Abscess has likely fistulous communication with the abutting splenic flexure of the colon. Colon is otherwise normal in appearance      Mild hepatosplenomegaly      Decreasing small left pleural effusion      Subacute anterior wedge compression fractures in the caudal thoracic spine      CT-DRAIN-PERITONEAL   Final Result      1.  CT guided perisplenic abscess catheter drainage.   2.  The current plan is to obtain a follow-up CT in 5-7 days..      OUTSIDE IMAGES-CT CHEST   Final Result      OUTSIDE IMAGES-CT ABDOMEN /PELVIS   Final Result      CT-ABDOMEN-PELVIS WITH   Final Result    Addendum 1 of 1   Addendum:   A prior noncontrast CT of the abdomen and pelvis from [facility] dated 10/19/2020 was made available for comparison. On the    prior study a perisplenic/subcapsular fluid collection was seen however    did not contain air at that time and    there was no discrete evidence of communication with the transverse colon.    Communication with splenic flexure of the colon and air within the    collection are new from the prior study.      Final      DX-CHEST-PORTABLE (1 VIEW)   Final Result      Slight interval interval improvement in congestive heart failure.            Assessment/Plan  1 SRUTHI: improving   2 Hyperkalemia:better  3 Metabolic acidosis  4 Anemia  Plan  no need for HD  Renal diet  Daily labs  Renal dose all meds  Avoid nephrotoxins  Prognosis guarded.  We will sign off the case, please call if needed

## 2020-11-15 NOTE — PROGRESS NOTES
"[facility] Surgical Acute Care Surgery [facility] Note               Author: Sarah Cruz M.D. Date & Time created: 11/15/2020  11:34 AM     Interval History:  \"I feel a little better today. \"Slight nuasea yesterday controlled with zofran. Tolerated full liquid diet. Ostomy with stool in bag.     Review of Systems:  Review of Systems   Constitutional: Negative for chills and fever.   Gastrointestinal: Positive for abdominal pain and nausea. Negative for vomiting.       Physical Exam:  Physical Exam  Constitutional:       Appearance: Normal appearance. He is obese. He is not ill-appearing.   HENT:      Head: Normocephalic and atraumatic.      Right Ear: External ear normal.      Left Ear: External ear normal.      Mouth/Throat:      Mouth: Mucous membranes are moist.   Eyes:      Extraocular Movements: Extraocular movements intact.   Cardiovascular:      Rate and Rhythm: Normal rate.   Pulmonary:      Effort: Pulmonary effort is normal.   Abdominal:      Tenderness: There is abdominal tenderness.      Comments: Mld appropriate incisional tenderness. Wound vac intact. 500cc output.  BETTE drain serous, 860 cc. Stool 50 cc, thick stool in bag.    Musculoskeletal: Normal range of motion.   Skin:     General: Skin is warm and dry.      Capillary Refill: Capillary refill takes less than 2 seconds.   Neurological:      General: No focal deficit present.      Mental Status: He is alert and oriented to person, place, and time.   Psychiatric:         Mood and Affect: Mood normal.         Behavior: Behavior normal.         Labs:          Recent Labs     11/13/20  0545 11/14/20  0420 11/15/20  0415   SODIUM 131* 134* 136   POTASSIUM 5.1 4.5 4.1   CHLORIDE 98 96 99   CO2 23 28 29   BUN 39* 41* 38*   CREATININE 2.84* 2.60* 2.10*   MAGNESIUM  --   --  1.5   CALCIUM 8.1* 8.1* 7.8*     Recent Labs     11/13/20 0545 11/14/20  0420 11/15/20  0415   ALTSGPT 7 9 8   ASTSGOT 17 19 22   ALKPHOSPHAT 63 66 70   TBILIRUBIN 0.4 0.4 0.5 "   GLUCOSE 270* 244* 226*     Recent Labs     20  0545 20  0420 11/15/20  0415   RBC 3.96* 4.10* 4.13*   HEMOGLOBIN 10.3* 10.6* 10.6*   HEMATOCRIT 32.9* 33.8* 34.2*   PLATELETCT 281 291 288     Recent Labs     20  0545 20  0420 11/15/20  0415   WBC 13.8* 9.9 9.1   NEUTSPOLYS 81.90* 76.80* 76.80*   LYMPHOCYTES 10.00* 14.00* 13.50*   MONOCYTES 6.00 6.70 7.10   EOSINOPHILS 0.00 0.00 0.00   BASOPHILS 0.10 0.20 0.20   ASTSGOT 17 19 22   ALTSGPT 7 9 8   ALKPHOSPHAT 63 66 70   TBILIRUBIN 0.4 0.4 0.5     Hemodynamics:  Temp (24hrs), Av.5 °C (97.7 °F), Min:36.1 °C (96.9 °F), Max:36.8 °C (98.2 °F)  Temperature: 36.8 °C (98.2 °F)  Pulse  Av.1  Min: 51  Max: 161   Blood Pressure: 147/96    Respiratory:    Respiration: 18, Pulse Oximetry: 97 %     Work Of Breathing / Effort: Shallow;Tachypnea  RUL Breath Sounds: Diminished, RML Breath Sounds: Diminished, RLL Breath Sounds: Diminished, ELIAS Breath Sounds: Diminished, LLL Breath Sounds: Diminished  Fluids:    Intake/Output Summary (Last 24 hours) at 11/15/2020 1134  Last data filed at 11/15/2020 0600  Gross per 24 hour   Intake 1160 ml   Output 3410 ml   Net -2250 ml       GI/Nutrition:  Orders Placed This Encounter   Procedures   • Diet Order Diet: Low Fiber(GI Soft)     Standing Status:   Standing     Number of Occurrences:   1     Order Specific Question:   Diet:     Answer:   Low Fiber(GI Soft) [2]     Medical Decision Making, by Problem:  Active Hospital Problems    Diagnosis   • SRUTHI (acute kidney injury) (HCC) [N17.9]   • Spontaneous perforation of colon (HCC) [K63.1]   • Adrenal insufficiency (HCC) [E27.40]   • Sepsis (HCC) [A41.9]   • LV dysfunction [I51.9]   • Paroxysmal A-fib with RVR (HCC) [I48.0]       Plan:  POD 5 Interiano's procedure for perforated splenic flexure  Having GI function - advance diet slowly. GI Soft today.   Renal function improving - UOP increasing, Cr improving slowly. Followed by renal.   Leukocytosis resolved, no  fevers.   Will leave drain in for now 2/2/ high output.   Has holloway orders.   Dr. Gan to take over care tomorrow.       Quality-Core Measures   Reviewed items::  Labs reviewed  Zabala catheter::  Critically Ill - Requiring Accurate Measurement of Urinary Output

## 2020-11-16 LAB
ALBUMIN SERPL BCP-MCNC: 2.7 G/DL (ref 3.2–4.9)
ALBUMIN/GLOB SERPL: 1 G/DL
ALP SERPL-CCNC: 75 U/L (ref 30–99)
ALT SERPL-CCNC: 9 U/L (ref 2–50)
ANION GAP SERPL CALC-SCNC: 7 MMOL/L (ref 7–16)
AST SERPL-CCNC: 23 U/L (ref 12–45)
BACTERIA SPEC ANAEROBE CULT: NORMAL
BASOPHILS # BLD AUTO: 0.4 % (ref 0–1.8)
BASOPHILS # BLD: 0.05 K/UL (ref 0–0.12)
BILIRUB SERPL-MCNC: 0.6 MG/DL (ref 0.1–1.5)
BUN SERPL-MCNC: 31 MG/DL (ref 8–22)
CALCIUM SERPL-MCNC: 8 MG/DL (ref 8.5–10.5)
CHLORIDE SERPL-SCNC: 100 MMOL/L (ref 96–112)
CO2 SERPL-SCNC: 32 MMOL/L (ref 20–33)
CREAT SERPL-MCNC: 1.33 MG/DL (ref 0.5–1.4)
EOSINOPHIL # BLD AUTO: 0.21 K/UL (ref 0–0.51)
EOSINOPHIL NFR BLD: 1.6 % (ref 0–6.9)
ERYTHROCYTE [DISTWIDTH] IN BLOOD BY AUTOMATED COUNT: 53.4 FL (ref 35.9–50)
GLOBULIN SER CALC-MCNC: 2.7 G/DL (ref 1.9–3.5)
GLUCOSE BLD-MCNC: 110 MG/DL (ref 65–99)
GLUCOSE BLD-MCNC: 140 MG/DL (ref 65–99)
GLUCOSE BLD-MCNC: 188 MG/DL (ref 65–99)
GLUCOSE BLD-MCNC: 212 MG/DL (ref 65–99)
GLUCOSE SERPL-MCNC: 160 MG/DL (ref 65–99)
HCT VFR BLD AUTO: 35.7 % (ref 42–52)
HGB BLD-MCNC: 11 G/DL (ref 14–18)
IMM GRANULOCYTES # BLD AUTO: 0.37 K/UL (ref 0–0.11)
IMM GRANULOCYTES NFR BLD AUTO: 2.8 % (ref 0–0.9)
LYMPHOCYTES # BLD AUTO: 2.46 K/UL (ref 1–4.8)
LYMPHOCYTES NFR BLD: 18.9 % (ref 22–41)
MCH RBC QN AUTO: 25.1 PG (ref 27–33)
MCHC RBC AUTO-ENTMCNC: 30.8 G/DL (ref 33.7–35.3)
MCV RBC AUTO: 81.3 FL (ref 81.4–97.8)
MONOCYTES # BLD AUTO: 1.14 K/UL (ref 0–0.85)
MONOCYTES NFR BLD AUTO: 8.7 % (ref 0–13.4)
NEUTROPHILS # BLD AUTO: 8.82 K/UL (ref 1.82–7.42)
NEUTROPHILS NFR BLD: 67.6 % (ref 44–72)
NRBC # BLD AUTO: 0.1 K/UL
NRBC BLD-RTO: 0.8 /100 WBC
PLATELET # BLD AUTO: 318 K/UL (ref 164–446)
PMV BLD AUTO: 10 FL (ref 9–12.9)
POTASSIUM SERPL-SCNC: 3.8 MMOL/L (ref 3.6–5.5)
PROT SERPL-MCNC: 5.4 G/DL (ref 6–8.2)
RBC # BLD AUTO: 4.39 M/UL (ref 4.7–6.1)
SIGNIFICANT IND 70042: NORMAL
SITE SITE: NORMAL
SODIUM SERPL-SCNC: 139 MMOL/L (ref 135–145)
SOURCE SOURCE: NORMAL
WBC # BLD AUTO: 13.1 K/UL (ref 4.8–10.8)

## 2020-11-16 PROCEDURE — 302102 BAG OST N IMG 2.25IN 2PC (FECAL): Performed by: SURGERY

## 2020-11-16 PROCEDURE — 302111 WAFER OST 2.25IN N IMG RD 2 PC (BARRIER): Performed by: SURGERY

## 2020-11-16 PROCEDURE — 302098 PASTE RING (FLAT): Performed by: SURGERY

## 2020-11-16 PROCEDURE — 97606 NEG PRS WND THER DME>50 SQCM: CPT

## 2020-11-16 PROCEDURE — A9270 NON-COVERED ITEM OR SERVICE: HCPCS | Performed by: INTERNAL MEDICINE

## 2020-11-16 PROCEDURE — 700111 HCHG RX REV CODE 636 W/ 250 OVERRIDE (IP): Performed by: STUDENT IN AN ORGANIZED HEALTH CARE EDUCATION/TRAINING PROGRAM

## 2020-11-16 PROCEDURE — 85025 COMPLETE CBC W/AUTO DIFF WBC: CPT

## 2020-11-16 PROCEDURE — A9270 NON-COVERED ITEM OR SERVICE: HCPCS | Performed by: STUDENT IN AN ORGANIZED HEALTH CARE EDUCATION/TRAINING PROGRAM

## 2020-11-16 PROCEDURE — 82962 GLUCOSE BLOOD TEST: CPT

## 2020-11-16 PROCEDURE — 99233 SBSQ HOSP IP/OBS HIGH 50: CPT | Performed by: INTERNAL MEDICINE

## 2020-11-16 PROCEDURE — 700111 HCHG RX REV CODE 636 W/ 250 OVERRIDE (IP): Performed by: SURGERY

## 2020-11-16 PROCEDURE — 80053 COMPREHEN METABOLIC PANEL: CPT

## 2020-11-16 PROCEDURE — 770001 HCHG ROOM/CARE - MED/SURG/GYN PRIV*

## 2020-11-16 PROCEDURE — 700102 HCHG RX REV CODE 250 W/ 637 OVERRIDE(OP): Performed by: INTERNAL MEDICINE

## 2020-11-16 PROCEDURE — 700111 HCHG RX REV CODE 636 W/ 250 OVERRIDE (IP): Performed by: NURSE PRACTITIONER

## 2020-11-16 PROCEDURE — A9270 NON-COVERED ITEM OR SERVICE: HCPCS | Performed by: SURGERY

## 2020-11-16 PROCEDURE — 80162 ASSAY OF DIGOXIN TOTAL: CPT

## 2020-11-16 PROCEDURE — 700102 HCHG RX REV CODE 250 W/ 637 OVERRIDE(OP): Performed by: STUDENT IN AN ORGANIZED HEALTH CARE EDUCATION/TRAINING PROGRAM

## 2020-11-16 PROCEDURE — 700102 HCHG RX REV CODE 250 W/ 637 OVERRIDE(OP): Performed by: SURGERY

## 2020-11-16 RX ORDER — DIGOXIN 125 MCG
125 TABLET ORAL DAILY
Status: DISCONTINUED | OUTPATIENT
Start: 2020-11-16 | End: 2020-11-17

## 2020-11-16 RX ADMIN — LINEZOLID 600 MG: 600 TABLET, FILM COATED ORAL at 17:46

## 2020-11-16 RX ADMIN — FENTANYL CITRATE 25 MCG: 50 INJECTION, SOLUTION INTRAMUSCULAR; INTRAVENOUS at 14:47

## 2020-11-16 RX ADMIN — HEPARIN SODIUM 5000 UNITS: 5000 INJECTION, SOLUTION INTRAVENOUS; SUBCUTANEOUS at 14:48

## 2020-11-16 RX ADMIN — ONDANSETRON 4 MG: 4 TABLET, ORALLY DISINTEGRATING ORAL at 17:49

## 2020-11-16 RX ADMIN — FENTANYL CITRATE 25 MCG: 50 INJECTION, SOLUTION INTRAMUSCULAR; INTRAVENOUS at 06:13

## 2020-11-16 RX ADMIN — INSULIN HUMAN 3 UNITS: 100 INJECTION, SOLUTION PARENTERAL at 06:11

## 2020-11-16 RX ADMIN — FLUCONAZOLE 400 MG: 200 TABLET ORAL at 05:26

## 2020-11-16 RX ADMIN — OLANZAPINE 7.5 MG: 5 TABLET, FILM COATED ORAL at 21:13

## 2020-11-16 RX ADMIN — DIGOXIN 125 MCG: 250 TABLET ORAL at 17:45

## 2020-11-16 RX ADMIN — HEPARIN SODIUM 5000 UNITS: 5000 INJECTION, SOLUTION INTRAVENOUS; SUBCUTANEOUS at 05:26

## 2020-11-16 RX ADMIN — FENTANYL CITRATE 25 MCG: 50 INJECTION, SOLUTION INTRAMUSCULAR; INTRAVENOUS at 21:40

## 2020-11-16 RX ADMIN — HEPARIN SODIUM 5000 UNITS: 5000 INJECTION, SOLUTION INTRAVENOUS; SUBCUTANEOUS at 21:14

## 2020-11-16 RX ADMIN — SERTRALINE 125 MG: 100 TABLET, FILM COATED ORAL at 05:26

## 2020-11-16 RX ADMIN — INSULIN GLARGINE 10 UNITS: 100 INJECTION, SOLUTION SUBCUTANEOUS at 06:12

## 2020-11-16 RX ADMIN — LINEZOLID 600 MG: 600 TABLET, FILM COATED ORAL at 05:26

## 2020-11-16 ASSESSMENT — ENCOUNTER SYMPTOMS
FEVER: 0
CONSTIPATION: 0
DIARRHEA: 0
CHILLS: 0
VOMITING: 0
SHORTNESS OF BREATH: 0
NAUSEA: 0
ABDOMINAL PAIN: 1

## 2020-11-16 NOTE — PROGRESS NOTES
General Surgery Progress Note  [facility]      CHIEF COMPLAINT: Abdominal pain.     HISTORY OF PRESENT ILLNESS: The patient is a 55 y.o. male, who presents with abdominal pain.  He was found to have a perforated splenic flexure and is status post Interiano's procedure.  Out of the ICU yesterday..     INTERVAL UPDATE: Doing well today.  Tolerating a diet.  Good ostomy function.  Renal function improving.    PAST MEDICAL HISTORY:  has a past medical history of A-fib ([facility]), Abscess (11/8/2020), Bacteremia (11/8/2020), Chest pain, Congestive heart failure ([facility]), Diabetes ([facility]), Diabetic neuropathy ([facility]), Hypercholesterolemia, Hypertension, Longstanding persistent atrial fibrillation ([facility]) (11/8/2020), LV dysfunction (11/8/2020), Morbid obesity ([facility]), NICM (nonischemic cardiomyopathy) ([facility]) (11/8/2020), Noncompliance, Normal cardiac stress test, Orthostatic hypotension, and Sepsis ([facility]) (11/8/2020).     PAST SURGICAL HISTORY:  has a past surgical history that includes toe amputation (Right, 11/10/2017); toe amputation (Right, 1/17/2018); irrigation & debridement ortho (Right, 2/19/2018); zzz cardiac cath (07/21/2018); and colectomy (N/A, 11/10/2020).     ALLERGIES:   Allergies   Allergen Reactions   • Daptomycin Rash     Body rash  RXN=1/2018     • Pcn [Penicillins] Hives and Rash      Rash & hives  RXN=since a kid  Tolerates cephalosporins   • Unasyn [Ampicillin-Sulbactam Sodium] Hives, Itching and Swelling   • Vancomycin Rash     Red man syndrome        CURRENT MEDICATIONS:   Home Medications     Reviewed by James Perez on 11/03/20 at 1859  Med List Status: Complete   Medication Last Dose Status   aspirin EC 81 MG EC tablet 11/3/2020 Active   cefdinir (OMNICEF) 300 MG Cap 11/3/2020 Active   doxycycline monohydrate (ADOXA) 100 MG tablet 11/3/2020 Active   furosemide (LASIX) 40 MG Tab 11/3/2020 Active   lisinopril (PRINIVIL) 10 MG Tab 11/3/2020 Active   metoprolol SR (TOPROL XL) 25 MG TABLET SR 24  HR 11/3/2020 Active   metroNIDAZOLE (FLAGYL) 500 MG Tab 11/3/2020 Active   olanzapine (ZYPREXA) 7.5 MG tablet 11/2/2020 Active   sertraline (ZOLOFT) 100 MG Tab 11/3/2020 Active   sertraline (ZOLOFT) 25 MG tablet 11/3/2020 Active   spironolactone (ALDACTONE) 25 MG Tab  Active                FAMILY HISTORY:   Family History   Problem Relation Age of Onset   • No Known Problems Mother    • No Known Problems Father         SOCIAL HISTORY:   Social History     Tobacco Use   • Smoking status: Never Smoker   • Smokeless tobacco: Never Used   Substance and Sexual Activity   • Alcohol use: No   • Drug use: No   • Sexual activity: Not on file       REVIEW OF SYSTEMS: Comprehensive review of systems was negative aside from disability    PHYSICAL EXAMINATION:     GENERAL: The patient is in no acute distress.   VITAL SIGNS: /89   Pulse 90   Temp 36.1 °C (96.9 °F) (Temporal)   Resp 18   Ht 1.829 m (6')   Wt (!) 179 kg (394 lb 10 oz)   SpO2 95%   HEAD AND NECK: Demonstrates symmetric, reactive pupils. Extraocular muscles   are intact. Nares and oropharynx are clear.   NECK: Supple. No adenopathy.  CHEST:No respiratory distress.    CARDIOVASCULAR: Regular rate. The extremities are well perfused.   ABDOMEN: Obese abdomen.  Wound VAC in place.  Drain with serosanguineous fluid.   EXTREMITIES: Examination of the upper and lower extremities demonstrates no cyanosis edema or clubbing.  NEUROLOGIC: Alert & oriented x 3, Normal motor function, Normal sensory function, No focal deficits noted    Drain output:   Output by Drain (mL) 11/14/20 0700 - 11/14/20 1859 11/14/20 1900 - 11/15/20 0659 11/15/20 0700 - 11/15/20 1859 11/15/20 1900 - 11/16/20 0659 11/16/20 0700 - 11/16/20 0858   Patient has no LDAs of requested type attached.        LABORATORY VALUES:   Recent Labs     11/14/20  0420 11/15/20  0415 11/16/20  0535   WBC 9.9 9.1 13.1*   RBC 4.10* 4.13* 4.39*   HEMOGLOBIN 10.6* 10.6* 11.0*   HEMATOCRIT 33.8* 34.2* 35.7*   MCV  82.4 82.8 81.3*   MCH 25.9* 25.7* 25.1*   MCHC 31.4* 31.0* 30.8*   RDW 53.7* 54.1* 53.4*   PLATELETCT 291 288 318   MPV 10.0 9.9 10.0     Recent Labs     11/14/20  0420 11/15/20  0415 11/16/20  0535   SODIUM 134* 136 139   POTASSIUM 4.5 4.1 3.8   CHLORIDE 96 99 100   CO2 28 29 32   GLUCOSE 244* 226* 160*   BUN 41* 38* 31*   CREATININE 2.60* 2.10* 1.33   CALCIUM 8.1* 7.8* 8.0*     Recent Labs     11/14/20  0420 11/15/20  0415 11/16/20  0535   ASTSGOT 19 22 23   ALTSGPT 9 8 9   TBILIRUBIN 0.4 0.5 0.6   ALKPHOSPHAT 66 70 75   GLOBULIN 2.8 2.7 2.7            IMAGING:   US-RENAL   Final Result    Examination was limited due to poor sound penetration related to body habitus.   Grossly normal appearance of the kidneys.      EC-ECHOCARDIOGRAM COMPLETE W/ CONT   Final Result      DX-CHEST-PORTABLE (1 VIEW)   Final Result      No pneumothorax identified following right subclavian line placement      Worsening atelectasis with consolidation at the bases not excluded      Stable cardiac silhouette enlargement      CT-ABDOMEN-PELVIS WITH   Final Result      Interval decrease in size of perisplenic abscess. Pigtail catheter now in place      Abscess has likely fistulous communication with the abutting splenic flexure of the colon. Colon is otherwise normal in appearance      Mild hepatosplenomegaly      Decreasing small left pleural effusion      Subacute anterior wedge compression fractures in the caudal thoracic spine      CT-DRAIN-PERITONEAL   Final Result      1.  CT guided perisplenic abscess catheter drainage.   2.  The current plan is to obtain a follow-up CT in 5-7 days..      OUTSIDE IMAGES-CT CHEST   Final Result      OUTSIDE IMAGES-CT ABDOMEN /PELVIS   Final Result      CT-ABDOMEN-PELVIS WITH   Final Result   Addendum 1 of 1   Addendum:   A prior noncontrast CT of the abdomen and pelvis from [facility] dated 10/19/2020 was made available for comparison. On the    prior study a  perisplenic/subcapsular fluid collection was seen however    did not contain air at that time and    there was no discrete evidence of communication with the transverse colon.    Communication with splenic flexure of the colon and air within the    collection are new from the prior study.      Final      DX-CHEST-PORTABLE (1 VIEW)   Final Result      Slight interval interval improvement in congestive heart failure.          Problem List:   Patient Active Problem List    Diagnosis Date Noted   • SRUTHI (acute kidney injury) ([facility]) 11/11/2020     Priority: High   • Spontaneous perforation of colon ([facility]) 11/10/2020     Priority: High   • Orthostatic hypotension 11/27/2018     Priority: High   • Acute on chronic diastolic heart failure ([facility]) 11/21/2018     Priority: High   • Fall 07/10/2018     Priority: High   • Atypical chest pain 07/09/2018     Priority: High   • Diabetic foot ([facility]) 02/17/2018     Priority: High   • Diabetic infection of right foot ([facility]) 02/02/2018     Priority: High   • Lower limb amputation, great toe ([facility]) 01/15/2018     Priority: High   • Gangrene of toe of right foot, s/p recent amputation (CMS-HCC) 11/11/2017     Priority: High   • Adrenal insufficiency ([facility]) 11/10/2020     Priority: Medium   • Sepsis ([facility]) 11/08/2020     Priority: Medium   • LV dysfunction 11/08/2020     Priority: Medium   • Fall from ground level 01/09/2019     Priority: Medium   • Lumbar compression fracture ([facility]) 12/29/2018     Priority: Medium   • Psychogenic nonepileptic seizure 11/24/2018     Priority: Medium   • Bilateral leg edema 11/20/2018     Priority: Medium   • Venous stasis dermatitis of both lower extremities 11/20/2018     Priority: Medium   • Urinary retention 09/27/2018     Priority: Medium   • Other chest pain 09/10/2018     Priority: Medium   • Difficulty voiding 09/10/2018     Priority: Medium   • Vitamin B12 deficiency 07/24/2018     Priority: Medium   • Paroxysmal A-fib with RVR ([facility]) 07/09/2018      Priority: Medium   • Left knee pain 11/23/2017     Priority: Medium   • Class 2 obesity due to excess calories without serious comorbidity with body mass index (BMI) of 37.0 to 37.9 in adult 10/22/2018     Priority: Low   • Acquired circulating anticoagulants ([facility]) 07/09/2018     Priority: Low   • Essential hypertension 02/08/2018     Priority: Low   • GERD (gastroesophageal reflux disease) 01/15/2018     Priority: Low   • Mixed hyperlipidemia 01/15/2018     Priority: Low   • Type 2 diabetes mellitus, without long-term current use of insulin, with peripheral neuropathy ([facility]) 11/22/2017     Priority: Low   • Candidal intertrigo 11/11/2017     Priority: Low   • Obesity 11/11/2017     Priority: Low   • NICM (nonischemic cardiomyopathy) ([facility]) 11/08/2020   • Debility 10/26/2019   • Polypharmacy 10/20/2019   • Pre-diabetes 10/19/2019   • Chronic systolic heart failure ([facility]) 11/20/2018   • Fluid overload 11/20/2018   • Nonischemic cardiomyopathy ([facility]) 09/09/2018   • Paralysis of both lower limbs ([facility]) 07/22/2018   • Chest pain 07/20/2018   • Dizziness 07/13/2018   • Acute conjunctivitis 07/11/2018   • Chronic atrial fibrillation 02/08/2018   • Hyperglycemia 11/11/2017       IMPRESSION AND PLAN:     1.  Status post Interiano's procedure for perforated splenic flexure.    1.  Out of bed for all meals, mobilize  2.  Zabala out today.   Renal function near normal  3.  Will likely need transitional/rehab facility                ___________________________________   Storm Gan M.D.  [facility].  [phone]    DD: 11/16/2020 DT: 8:58 AM

## 2020-11-16 NOTE — PROGRESS NOTES
Infectious Disease Progress Note    Author: Dianne Fang M.D. Date & Time of service: 2020  9:02 AM       Chief Complaint:  Intrabdominal abscess and bacteremia     Interval History:   55 y.o. diabetic male admitted 11/3/2020.+ A. fib, CHD, and severe morbid obesity.  Seen by ID in the past for right foot abscess with cultures positive for MRSA and Proteus.  s/p GLF approximately 1 month ago and was admitted to [facility] with left upper quadrant pain.  CT at the time revealed a subcapsular splenic hematoma. Presented to ED complaining of abdominal pain described as 4/5 out of 10 dull and constant located in the left upper quadrant.      AF WBC 9.4 Less pain in side but remains tender to touch. Denies SE Zyvox-counseled about sxs serotonin syndrome   T-max 99.7 WBC 15.4 patient underwent colectomy, colostomy placement and I&D of flank wound with wound VAC placement on 11/10/2020 by Dr. Desouza.  Postop course complicated by hypotension and transferred to the ICU.  On pressors.  Patient complaining of shortness of breath as well as lightheadedness.  Also has postop abdominal pain.   afebrile WBC 13.3.  Patient weaned off pressors overnight.  Operative cultures growing group D Enterococcus and yeast.  Patient feeling better today.  Improved shortness of breath.  Abdominal pain stable   afebrile WBC 13.8 patient continues to feel better.  He is inquiring when he can have food by mouth.   afebrile WBC 9.9.  Patient feeling a bit better today.  His diet has been slowly advanced.  He is however complaining of some sharp abdominal pain when he coughs    Review of Systems:  Review of Systems   Constitutional: Negative for chills and fever.   Respiratory: Negative for shortness of breath.    Gastrointestinal: Positive for abdominal pain. Negative for constipation, diarrhea, nausea and vomiting.       Hemodynamics:  Temp (24hrs), Av.6 °C (97.9 °F), Min:36.1 °C (96.9 °F), Max:37.6  °C (99.6 °F)  Temperature: 36.1 °C (96.9 °F)  Pulse  Av.3  Min: 51  Max: 161   Blood Pressure: 152/89      Physical Exam:  Physical Exam  Constitutional:       Appearance: Normal appearance.   Cardiovascular:      Rate and Rhythm: Normal rate and regular rhythm.   Pulmonary:      Effort: Pulmonary effort is normal.      Breath sounds: Normal breath sounds.   Abdominal:      General: There is distension.      Palpations: Abdomen is soft.      Tenderness: There is abdominal tenderness.      Comments: Abdominal tenderness to palpation, right side wound VAC in place, left side drain with murky fluid   Musculoskeletal:      Right lower leg: No edema.      Left lower leg: No edema.   Skin:     General: Skin is warm and dry.   Neurological:      General: No focal deficit present.      Mental Status: He is alert and oriented to person, place, and time.   Psychiatric:         Mood and Affect: Mood normal.         Behavior: Behavior normal.         Meds:    Current Facility-Administered Medications:   •  fentaNYL  •  fluconazole  •  insulin glargine  •  digoxin  •  heparin  •  insulin regular **AND** POC Blood Glucose **AND** NOTIFY MD and PharmD **AND** glucose **AND** dextrose 50%  •  albuterol  •  ondansetron  •  linezolid  •  ondansetron  •  OLANZapine  •  sertraline    Labs:  Recent Labs     11/14/20  0420 11/15/20  0415 11/16/20  0535   WBC 9.9 9.1 13.1*   RBC 4.10* 4.13* 4.39*   HEMOGLOBIN 10.6* 10.6* 11.0*   HEMATOCRIT 33.8* 34.2* 35.7*   MCV 82.4 82.8 81.3*   MCH 25.9* 25.7* 25.1*   RDW 53.7* 54.1* 53.4*   PLATELETCT 291 288 318   MPV 10.0 9.9 10.0   NEUTSPOLYS 76.80* 76.80* 67.60   LYMPHOCYTES 14.00* 13.50* 18.90*   MONOCYTES 6.70 7.10 8.70   EOSINOPHILS 0.00 0.00 1.60   BASOPHILS 0.20 0.20 0.40     Recent Labs     11/14/20  0420 11/15/20  0415 11/16/20  0535   SODIUM 134* 136 139   POTASSIUM 4.5 4.1 3.8   CHLORIDE 96 99 100   CO2 28 29 32   GLUCOSE 244* 226* 160*   BUN 41* 38* 31*     Recent Labs      11/14/20  0420 11/15/20  0415 11/16/20  0535   ALBUMIN 2.9* 2.7* 2.7*   TBILIRUBIN 0.4 0.5 0.6   ALKPHOSPHAT 66 70 75   TOTPROTEIN 5.7* 5.4* 5.4*   ALTSGPT 9 8 9   ASTSGOT 19 22 23   CREATININE 2.60* 2.10* 1.33       Imaging:  Ct-abdomen-pelvis With    Result Date: 11/9/2020 11/8/2020 11:34 PM HISTORY/REASON FOR EXAM:  Abdominal infection suspected; Has perisplenic abscess with possible connection to bowel TECHNIQUE/EXAM DESCRIPTION: CT of the abdomen and pelvis with contrast. Contrast-enhanced helical scanning was obtained from the diaphragmatic domes through the pubic symphysis following the bolus administration of 100 mL of nonionic contrast (Omnipaque 350) without complication. Low dose optimization technique was utilized for this CT exam including automated exposure control and adjustment of the mA and/or kV according to patient size. COMPARISON: 11/3/2020 CT FINDINGS: The visualized lung bases show diminished small left pleural fluid that layers dependently. There is similar middle and left lower lobe atelectasis . Similar multichamber cardiac enlargement CT Abdomen: There is a new pigtail catheter in the anterior perisplenic fluid collection. The collection does diminish in size now measuring 58 x 33 from 79 x 40 mm. There is still gas within it and it is in direct contact with the splenic flexure of the colon. A small amount of gas is also seen in the more superficial soft tissues inferior to the pigtail catheter which crosses the ninth intercostal space No new abscess is detected Contrast is present in the small bowel but not yet in the transverse colon. No contrast extravasation is seen. No abnormal bowel dilatation The pancreas, adrenal glands and kidneys enhance normally. Mild hepatosplenomegaly Some low-density in the gallbladder likely indicating cholelithiasis There is no lymphadenopathy or aneurysmal change of the aorta. Compression deformities in the spine are again seen with subacute fractures  resulting in anterior wedging at T11 and T12 There is diffuse fat stranding indicating anasarca CT Pelvis: Visualized pelvic structures are unremarkable. Prostate is within normal limits for age. No lymphadenopathy. There is no free air or free fluid.     Interval decrease in size of perisplenic abscess. Pigtail catheter now in place Abscess has likely fistulous communication with the abutting splenic flexure of the colon. Colon is otherwise normal in appearance Mild hepatosplenomegaly Decreasing small left pleural effusion Subacute anterior wedge compression fractures in the caudal thoracic spine    Ct-abdomen-pelvis With    Addendum Date: 11/5/2020    Addendum: A prior noncontrast CT of the abdomen and pelvis from [facility] dated 10/19/2020 was made available for comparison. On the prior study a perisplenic/subcapsular fluid collection was seen however did not contain air at that time and there was no discrete evidence of communication with the transverse colon. Communication with splenic flexure of the colon and air within the collection are new from the prior study.    Result Date: 11/5/2020  11/3/2020 4:02 PM HISTORY/REASON FOR EXAM:  Abd pain, unspecified; IV contrast only. TECHNIQUE/EXAM DESCRIPTION:   CT scan of the abdomen and pelvis with contrast. Contrast-enhanced helical scanning was obtained from the diaphragmatic domes through the pubic symphysis following the bolus administration of nonionic contrast without complication. 100 mL of Omnipaque 350 nonionic contrast was administered without complication. Low dose optimization technique was utilized for this CT exam including automated exposure control and adjustment of the mA and/or kV according to patient size. COMPARISON: 9/26/2018 FINDINGS: Chest Base: Small left pleural effusion with mild bibasilar atelectasis. Heart is mildly enlarged. Liver:  Diffuse hypoattenuation of the liver suggesting fatty infiltration. Moderately  enlarged. Gallbladder: Cholelithiasis. Biliary tract: Nondilated. Pancreas: Normal. Spleen: There is a rim-enhancing perisplenic fluid collection which contains air and which communicates with the splenic flexure of the colon. This most likely represents an abscess and measures about 8.7 x 7.9 cm. The anterior aspect of the spleen is nonenhancing with irregular margins could be related to infection of splenic parenchyma or infarction. Adrenals: Normal. Kidneys and Collecting Systems:  There is mild right renal cortical scarring. No hydronephrosis. No renal mass. Gastrointestinal tract:  No bowel obstruction. The appendix is normal. Peritoneum: No free air or free fluid. Reproductive organs:  Normal. Bladder:  Normal. Vessels:  Normal caliber. Lymph  Nodes:  No lymphadenopathy. Abdominal wall: Within normal limits. Bones:  Age indeterminate possibly subacute to chronic T11 and T12 superior endplate compression fractures.     1.  Peripherally enhancing perisplenic fluid collection which communicates with the splenic flexure of the colon and which contains foci of air is most consistent with an abscess/infected fluid collection. There is nonenhancement of the anterior aspect of the spleen suggesting infection/infarction. Surgical consultation is recommended. 2.  Hepatomegaly and hepatic steatosis. 3.  Cholelithiasis. 4.  Right renal cortical scarring. No hydronephrosis. 5.  Small left pleural effusion. Mild bibasilar atelectasis. 6.  Subacute to chronic appearing T11 and T12 superior endplate compression fractures. These findings were discussed with SVETLANA PAREDES on 11/3/2020 4:49 PM.     Ct-drain-peritoneal    Result Date: 11/4/2020 11/4/2020 5:19 PM HISTORY/REASON FOR EXAM:  Joya Splenic Fluid Collection TECHNIQUE/EXAM DESCRIPTION: LUQ parasplenic abscess drainage with CT guidance. Low dose optimization technique was utilized for this CT exam including automated exposure control and adjustment of the mA and/or  kV according to patient size. PROCEDURE: Informed consent was obtained. SEDATION: Moderate sedation was provided. Pulse oximetry and continuous cardiac monitoring by the nurse was performed throughout the exam. Intraservice time was 30 minutes. Localizing CT images were obtained with the patient in supine position. The skin was prepped with Betadine and draped in a sterile fashion. Following local anesthesia with 1% Lidocaine, a 17 G guiding needle was placed and needle path confirmed with CT. An Amplatz guidewire was placed and following serial tract dilatation, a 10 Latvian pigtail locking catheter was placed. A specimen was collected and submitted for culture and sensitivity and Gram stain. Total of 6 mL of Brownish fluid was drained. The catheter was secured to the skin and connected to suction bulb drainage. Final CT images were obtained documenting catheter position. The patient tolerated the procedure well with no evidence of complication. COMPARISON: CT scan abdomen and pelvis 11/3/2020 FINDINGS: The final CT images show satisfactory catheter position within the target collection.     1.  CT guided perisplenic abscess catheter drainage. 2.  The current plan is to obtain a follow-up CT in 5-7 days..    Dx-chest-portable (1 View)    Result Date: 11/10/2020  11/10/2020 12:29 PM HISTORY/REASON FOR EXAM: new central line. TECHNIQUE/EXAM DESCRIPTION AND NUMBER OF VIEWS: Single AP view of the chest. COMPARISON: November 3 FINDINGS: Hardware: ] Tip overlies the cavoatrial junction Lungs: Linear middle lobe and left basilar opacity is seen. Volumes are low Pleura:  No pneumothorax is identified Heart and mediastinum: There is stable cardiac silhouette enlargement.     No pneumothorax identified following right subclavian line placement Worsening atelectasis with consolidation at the bases not excluded Stable cardiac silhouette enlargement    Dx-chest-portable (1 View)    Result Date: 11/3/2020  11/3/2020 3:41 PM  HISTORY/REASON FOR EXAM:  Chest Pain. TECHNIQUE/EXAM DESCRIPTION AND NUMBER OF VIEWS: Single portable view of the chest. COMPARISON: September 10, 2020 FINDINGS: The cardiac size is enlarged. There is interstitial prominence. Lung aeration has improved. No pneumothorax. No obvious pleural effusion.     Slight interval interval improvement in congestive heart failure.    Us-renal    Result Date: 2020 6:21 PM HISTORY/REASON FOR EXAM:  Abnormal Labs TECHNIQUE/EXAM DESCRIPTION: Renal ultrasound. COMPARISON:  None FINDINGS: The right kidney measures 9.39 cm. The left kidney measures 9.54 cm. There is no hydronephrosis. There are no abnormal calcifications. The bladder not fully the time of imaging.      Examination was limited due to poor sound penetration related to body habitus. Grossly normal appearance of the kidneys.    Ec-echocardiogram Complete W/ Cont    Result Date: 2020  Transthoracic Echo Report Echocardiography Laboratory CONCLUSIONS Normal left ventricular chamber size. Moderately reduced left ventricular systolic function. Left ventricular ejection fraction is visually estimated to be 30-40%; variable related to atrial fibrillation. Mild mitral regurgitation. Moderately dilated right ventricle. Reduced right ventricular systolic function. Right heart pressures are normal. No prior study is available for comparison. EMELY FLORES Exam Date:         2020                    11:34 Exam Location:     Inpatient Priority:          Routine Ordering Physician:        SUZI EWING Referring Physician: Sonographer:               Sherry Funes [facility] Age:    55     Gender:    M MRN:    [id number] :    1965 BSA:    2.7    Ht (in):    72     Wt (lb):    350 Exam Type:     Complete, Contrast Indications:     Congestive Heart Failure ICD Codes:       428 CPT Codes:       03072,  BP:   136    /   86     HR:   100 Technical Quality:       Technically difficult study -                           adequate information is obtained MEASUREMENTS  (Male / Female) Normal Values 2D ECHO LV Diastolic Diameter PLAX        4.6 cm                4.2 - 5.9 / 3.9 - 5.3 cm LV Systolic Diameter PLAX         4.1 cm                2.1 - 4.0 cm IVS Diastolic Thickness           1.1 cm                LVPW Diastolic Thickness          0.85 cm               LVOT Diameter                     2.5 cm                LV Ejection Fraction MOD 4C       34.7 %                IVC Diameter                      0.95 cm               DOPPLER AV Peak Velocity                  1.3 m/s               AV Peak Gradient                  6.3 mmHg              AV Mean Gradient                  3.9 mmHg              LVOT Peak Velocity                1 m/s                 AV Area Cont Eq vti               3.7 cm2               TR Peak Velocity                  232 cm/s              * Indicates values subject to auto-interpretation LV EF:        % FINDINGS Left Ventricle 3 mL of contrast was administered. Contrast was used to enhance visualization of the endocardial border. Existing IV was used. Mild concentric left ventricular hypertrophy. Normal left ventricular chamber size.  Moderately reduced left ventricular systolic function. Left ventricular ejection fraction is visually estimated to be 30-40%. Diastolic function is difficult to assess with atrial fibrillation. Right Ventricle Moderately dilated right ventricle. Reduced right ventricular systolic function. Right Atrium Enlarged right atrium. Normal inferior vena cava size and inspiratory collapse. Left Atrium Normal left atrial size. Mitral Valve Structurally normal mitral valve. Mild mitral regurgitation. No mitral stenosis. Aortic Valve Tricuspid aortic valve. Structurally normal aortic valve. No stenosis or regurgitation seen. Tricuspid Valve Structurally normal tricuspid valve. Mild tricuspid regurgitation. Right atrial pressure is estimated to be 3 mmHg. Estimated right  "ventricular systolic pressure  is 25 mmHg. No tricuspid stenosis. Right heart pressures are normal. Pulmonic Valve Structurally normal pulmonic valve without significant stenosis or regurgitation. Pericardium Normal pericardium without effusion. Aorta The aortic root is normal.  Ascending aorta diameter is 3.6 cm. Germain Pacheco M.D. (Electronically Signed) Final Date:     11 November 2020                 12:38 Amended:        11 November 2020 12:40      Micro:  Results     Procedure Component Value Units Date/Time    CULTURE WOUND W/ GRAM STAIN [[id number]]  (Abnormal)  (Susceptibility) Collected: 11/10/20 1039    Order Status: Completed Specimen: Wound Updated: 11/12/20 1536     Significant Indicator POS     Source WND     Site COLON PERFORATION     Culture Result -     Gram Stain Result Rare WBCs.  Rare Gram positive cocci.  Rare Gram positive rods.       Culture Result Enterococcus faecalis  Light growth        Candida albicans  Light growth      Susceptibility     Enterococcus faecalis (1)     Antibiotic Interpretation Microscan Method Status    Daptomycin Sensitive 4 mcg/mL ISAMAR Final    Ampicillin Sensitive <=2 mcg/mL ISAMAR Final    Gent Synergy Sensitive <=500 mcg/mL ISAMAR Final    Vancomycin Sensitive 1 mcg/mL ISAMAR Final    Penicillin Sensitive 8 mcg/mL ISAMAR Final                   Anaerobic Culture [[id number]] Collected: 11/10/20 1039    Order Status: Completed Specimen: Wound Updated: 11/12/20 1536     Significant Indicator NEG     Source WND     Site COLON PERFORATION     Culture Result Culture in progress.    BLOOD CULTURE [[id number]] Collected: 11/05/20 1950    Order Status: Completed Specimen: Blood from Peripheral Updated: 11/10/20 2100     Significant Indicator NEG     Source BLD     Site PERIPHERAL     Culture Result No growth after 5 days of incubation.    Narrative:      Per Hospital Policy: Only change Specimen Src: to \"Line\" if  specified by physician order.  Right AC    BLOOD CULTURE [[id number]] " "Collected: 11/05/20 1950    Order Status: Completed Specimen: Blood from Peripheral Updated: 11/10/20 2100     Significant Indicator NEG     Source BLD     Site PERIPHERAL     Culture Result No growth after 5 days of incubation.    Narrative:      Per Hospital Policy: Only change Specimen Src: to \"Line\" if  specified by physician order.  Right Hand    GRAM STAIN [[id number]] Collected: 11/10/20 1039    Order Status: Completed Specimen: Wound Updated: 11/10/20 1739     Significant Indicator .     Source WND     Site COLON PERFORATION     Gram Stain Result Rare WBCs.  Rare Gram positive cocci.  Rare Gram positive rods.      SARS-CoV-2, PCR (In-House) [[id number]] Collected: 11/09/20 1855    Order Status: Completed Updated: 11/09/20 2252     SARS-CoV-2 Source NP Swab     SARS-CoV-2 by PCR NotDetected     Comment: [facility] providers: PLEASE REFER TO DE-ESCALATION AND RETESTING PROTOCOL  on [email or web address]  **The TaqPath COVID-19 SARS-CoV-2 test has been made available for use under  the Emergency Use Authorization (EUA) only.         Narrative:      Collected By:[id number] ZULY MOURA  Is patient being admitted?->Yes  Does this patient meet criteria for Rush/Cepheid per [facility]  Inpatient Workflow? (See workflow link below)->Yes  Expected turn around time?->Rush (Cepheid 2-4 hours)  Is this the patients First SARS CoV-2 test?->No  Is this patient employed in healthcare?->No  Is the patient symptomatic as defined by the CDC?->No  Is the patient hospitalized?->Yes  Is the patient in the ICU?->No  Is the patient a resident in a congregate care setting?->No  Is the patient pregnant?->No    COVID/SARS CoV-2 PCR [[id number]] Collected: 11/09/20 1855    Order Status: Completed Specimen: Respirate from Nasopharyngeal Updated: 11/09/20 1907     COVID Order Status Received     Comment: The order for SARS CoV-2 testing has been received by the  Laboratory. This result is neither positive nor negative.  Final results of testing " will report in 24-48 hours, separately.         Narrative:      Collected By:[id number] DANYRICKI NORMA MOURA  Is patient being admitted?->Yes  Does this patient meet criteria for Rush/Cepheid per [facility]  Inpatient Workflow? (See workflow link below)->Yes  Expected turn around time?->Rush (Cepheid 2-4 hours)  Is this the patients First SARS CoV-2 test?->No  Is this patient employed in healthcare?->No  Is the patient symptomatic as defined by the CDC?->No  Is the patient hospitalized?->Yes  Is the patient in the ICU?->No  Is the patient a resident in a congregate care setting?->No  Is the patient pregnant?->No          Assessment:  Active Hospital Problems    Diagnosis   • SRUTHI (acute kidney injury) (HCC) [N17.9]   • Spontaneous perforation of colon (HCC) [K63.1]   • Adrenal insufficiency (HCC) [E27.40]   • Sepsis (HCC) [A41.9]   • LV dysfunction [I51.9]   • Paroxysmal A-fib with RVR ([facility]) [I48.0]     Interval 24 hours:      AF, O2 RA  Labs reviewed  Imaging personally reviewed both images and reports.   Micro reviewed    Patient is reporting abdominal pain and poor appetite.  Continued on antibiotics as below.       ASSESSMENT/PLAN:      Hospital Course:   CT scan on 11/3 revealed a peripherally enhancing perisplenic fluid collection measuring 8.7 x 7.9 cm, communicating with the splenic flexure of the colon and contained foci of air consistent with abscess/infected fluid.  Also noted nonenhancing of the anterior aspect of the spleen suggesting infection/infarction.    Status post surgery on 11/10/2020     Postop hypotension, resolved  Leukocytosis - new today     Acute kidney injury- improved   Renally dose abx accordingly  Avoid nephrotoxins  Renal ultrasound-unremarkable  Monitor  Nephrology following     Bacteremia  -Blood cultures on 11/31/2 + viridans streptococci, possible contaminant  -Blood culture on 11/5 with no growth to date     Perisplenic abscess with possible communication to the bowel  Left upper  quadrant abscess  Afebrile  Splenic enhancement on CT, suggesting infection or infarction  -Drainage on 11/4 and peritoneal fluid Enterococcus faecalis, ampicillin sensitive and C albicans  Recent ground-level fall located by a subcapsular splenic hematoma    --- Continue zyvox and fluconazole  --- Monitor CBC while on linezolid-platelets stable  Status post segmental colectomy, colostomy, irrigation and debridement of flank wound and wound VAC placement on 11/10/2020 by Dr. Desouza.  Patient found to have a left upper quadrant abscess inferior to the spleen with a large bowel wall defect.  Also noted to have an abdominal wall abscess.  Operative cultures (colon perforation) -ampicillin sensitive Enterococcus faecalis and Candida albicans.  Gram stain+ GPC, GPR  Follow drain output-significant drain output overnight, greater than 1000 cc  Repeat CT scan when renal function improves     Type II diabetes mellitus  Keep BS under 150 to help control current infection     A. Fib  Contributing to hypotension  Cards following      Diarrhea  -C. difficile tested negative on 11/4     Antibiotic allergies: Daptomycin rash, penicillins rash and hives, tolerates cephalosporins-discussed allergies and he states that he has developed significant rash and hives with penicillins several times, most recently a year and a half ago- will avoid penicillins      ID will follow up.

## 2020-11-16 NOTE — WOUND TEAM
[facility]  Inpatient Services  Wound and Skin Care Progress Note    Admission Date: 11/3/2020     Last order of IP CONSULT TO WOUND CARE was found on 11/10/2020 from Hospital Encounter on 11/3/2020     HPI, PMH, SH: Reviewed    Unit where seen by Wound Team: [id number]     WOUND CONSULT/FOLLOW UP RELATED TO:  Scheduled NPWT dressing and ostomy change      Self Report / Pain Level:  Received 25mcg of fentanyl prior to dresing change.      OBJECTIVE:  Pt in low airloss bariatric bed, NPWT dressing intact.      WOUND TYPE, LOCATION, CHARACTERISTICS (Pressure Injuries: location, stage, POA or date identified)       Negative Pressure Wound Therapy 11/10/20 Surgical Abdomen Midline (Active)   Vacuum Serial Number [id number] 11/11/20 1130   NPWT Pump Mode / Pressure Setting Ulta;Continuous;125 mmHg    Dressing Type Medium;Black Foam (Regular)    Number of Foam Pieces Used 3    Canister Changed No    Output (mL) 100 mL    NEXT Dressing Change/Treatment Date 11/18/20    WOUND NURSE ONLY - Time Spent with Patient (mins) 60      Wound 11/10/20 Full Thickness Wound Abdomen Midline wound vac (Active)   Wound Image   11/12/20 1015   Site Assessment Red;Clean    Periwound Assessment Clean;Dry;Intact;Pink    Margins Defined edges;Attached edges    Closure Secondary intention;Staples    Drainage Amount Moderate    Drainage Description Serosanguineous    Treatments Cleansed;Site care    Wound Cleansing Approved Wound Cleanser    Periwound Protectant Benzoin;Drape;Mepitel    Dressing Cleansing/Solutions Not Applicable    Dressing Options Wound Vac    Dressing Changed Changed    Dressing Status Clean;Dry;Intact    Dressing Change/Treatment Frequency Monday, Wednesday, Friday, and As Needed    NEXT Dressing Change/Treatment Date 11/18/20    NEXT Weekly Photo (Inpatient Only) 11/18/20    Number of Staples Removed 10    Non-staged Wound Description Full thickness    Wound Length (cm) 21 cm    Wound Width (cm) 6 cm    Wound  Depth (cm) 6 cm    Wound Surface Area (cm^2) 126 cm^2    Wound Volume (cm^3) 756 cm^3    Wound Bed Granulation (%) 80 %    Shape Oval x2    Wound Odor None    Exposed Structures Adipose    WOUND NURSE ONLY - Time Spent with Patient (mins) 75      Vascular:    SHAYAN:   No results found.    Lab Values:    Lab Results   Component Value Date/Time    WBC 13.1 (H) 11/16/2020 05:35 AM    RBC 4.39 (L) 11/16/2020 05:35 AM    HEMOGLOBIN 11.0 (L) 11/16/2020 05:35 AM    HEMATOCRIT 35.7 (L) 11/16/2020 05:35 AM    CREACTPROT 0.54 10/23/2018 08:02 AM    SEDRATEWES 13 10/23/2018 08:02 AM    HBA1C 7.8 (H) 11/10/2020 03:31 AM      Culture Results show:  Recent Results (from the past 720 hour(s))   CULTURE WOUND W/ GRAM STAIN    Collection Time: 11/10/20 10:39 AM    Specimen: Wound   Result Value Ref Range    Significant Indicator POS (POS)     Source WND     Site COLON PERFORATION     Culture Result - (A)     Gram Stain Result       Rare WBCs.  Rare Gram positive cocci.  Rare Gram positive rods.      Culture Result Enterococcus faecalis  Light growth   (A)     Culture Result Candida albicans  Light growth   (A)        Susceptibility    Enterococcus faecalis - ISAMAR     Daptomycin 4 Sensitive mcg/mL     Ampicillin <=2 Sensitive mcg/mL     Gent Synergy <=500 Sensitive mcg/mL     Vancomycin 1 Sensitive mcg/mL     Penicillin 8 Sensitive mcg/mL     INTERVENTIONS BY WOUND TEAM:  Chart and images reviewed. Discussed with bedside RN. This RN in to assess patient. Ostomy appliance changed (See note below). Gloves changed and NPWT dressing removed. No retained foam. Wound cleansed with wound cleanser and gauze. Joya-wound prepped with benzoin and drape. mepitel one applied to staple/skin bridge between superior and inferior abdominal wounds. One piece of black foam packed into tunnel between 2 wounds from superior wound to inferior. A second piece of half thickness spiraled black foam in to inferior wound to fill depth and continued out and up  "over skin bridge toward superior wound. Third piece of full thickness cut to fit black foam applied to superior wound to fill depth. All exposed foam covered with drape. A hole was cut in drape over superior wound and trac pad applied. Suction resumed. No leaks identified.     Interdisciplinary consultation: Patient, Bedside RN (Connie), Wound RN (Delvis)    EVALUATION / RATIONALE FOR TREATMENT: Pt is an older obese gentleman admitted with LUQ abdominal pain. Pt was found to have a a perisplenic fluid collection containing gas and underwent surgical intervention. Fascia was closed while skin was left open with NPWT dressing applied. Wound team was consulted to manage wound vac. Pts wound is bright red with early granular tissue. Tunnel present between wounds. Continued NPWT to manage drainage while assisting in granular tissue development.     Meet with Dr. Desouza tomorrow AM.  Possibly assess buttocks at that time.      Goals: Steady decrease in wound area and depth weekly.    NURSING PLAN OF CARE ORDERS (X):    Dressing changes: See Dressing Care orders: X  Skin care: See Skin Care orders: X  RN Prevention Protocol: X  Rectal tube care: See Rectal Tube Care orders:   Other orders:    WOUND TEAM PLAN OF CARE:   Dressing changes by wound team:                   Follow up 3 times weekly:                NPWT change 3 times weekly:   ABD  Follow up 1-2 times weekly:    Left flank   Follow up Bi-Monthly:                   Follow up as needed:       Other (explain):     Anticipated discharge plans: will need ongoing wound care post DC  LTACH:        SNF/Rehab:                  Home Health Care:           Outpatient Wound Center:            Self Care:               [facility]  Inpatient Services  New Ostomy Management & Teaching    HPI:  Reviewed  PMH: Reviewed   SH: Reviewed    Subjective: \"I'm just going to watch this time.\"    Objective: See above    Colostomy 11/10/20 Transverse LUQ (Active)   Wound Image  " "  11/11/20 1135   Stomal Appliance Assessment Clean;Dry;Intact    Stoma Assessment Intact;Red    Stoma Shape Budded Less Than One Inch    Stoma Size (in) 1.5    Peristomal Assessment Intact    Mucocutaneous Junction Intact    Treatment Appliance Changed    Peristomal Protectant Paste Ring    Stomal Appliance 2 1/4\" (57mm) CTF;Paste Ring, 2\"    Output (mL) 0 mL    Output Color Black    WOUND RN ONLY - Stomal Appliance  2 Piece;2 1/4\" (57mm) CTF;Paste Ring, 2\"    Appliance Brand Daisy    Appliance Supplier [employer]    Secure Start completed Yes    WOUND NURSE ONLY - Time Spent with Patient (mins) 45       Ostomy Appliance (type and size):  2 piece 2 1/4 with paste ring      Interventions:  This RN verbalized each step during change. Previous appliance removed, cleansed skin with warm wash cloth. Barrier cut, due to stoma being nearly flush, paste ring applied to barrier and applied to skin.  Attached and closed pouch.        Pt education: Questions and concerns addressed    Evaluation:  Transverse colostomy after colon perforation post drain placement.  Pt will need ongoing teaching and likely benefit from SNF vs. Rehab. 3 sets of supplies ordered and requested bedside RN place into ostomy bag at bedside when they arrive.     Plan: Ostomy nurses to continue to follow for ostomy needs and teaching     Anticipated discharge needs: Supplies, supplier information, possible HH or outpatient ostomy clinic   "

## 2020-11-16 NOTE — PROGRESS NOTES
AA&Ox4.    SpO2 96% on 2L O2 via NC. Denies SOB.    Reporting 8/10 pain. Medicated per MAR.    SKIN     ***Drains RLQ BETTE drain compressed to self suction. Draining SS output. ***    Tolerating *** diet. Denies N/V.    + void.    + BM / LBM ***.    Pt ambulates/up with ****.    All needs met at this time. Call light within reach. Pt calls appropriately.

## 2020-11-16 NOTE — CARE PLAN
Problem: Nutritional:  Goal: Achieve adequate nutritional intake  Description: Advance diet as tolerated past clear liquids. Patient will consume >50% of meals.  Outcome: PROGRESSING AS EXPECTED  Diet advanced to Low Fiber (GI soft); one meal >50% thus far.  RD will continue to monitor.

## 2020-11-17 ENCOUNTER — APPOINTMENT (OUTPATIENT)
Dept: RADIOLOGY | Facility: MEDICAL CENTER | Age: 55
DRG: 329 | End: 2020-11-17
Attending: SURGERY
Payer: MEDICAID

## 2020-11-17 PROBLEM — F20.9 SCHIZOPHRENIA (HCC): Status: ACTIVE | Noted: 2020-11-17

## 2020-11-17 PROBLEM — D72.829 LEUCOCYTOSIS: Status: ACTIVE | Noted: 2020-11-17

## 2020-11-17 PROBLEM — R78.89 ELEVATED DIGOXIN LEVEL: Status: ACTIVE | Noted: 2020-11-17

## 2020-11-17 LAB
ALBUMIN SERPL BCP-MCNC: 2.5 G/DL (ref 3.2–4.9)
ALBUMIN/GLOB SERPL: 1.1 G/DL
ALP SERPL-CCNC: 65 U/L (ref 30–99)
ALT SERPL-CCNC: 9 U/L (ref 2–50)
ANION GAP SERPL CALC-SCNC: 4 MMOL/L (ref 7–16)
APPEARANCE UR: ABNORMAL
AST SERPL-CCNC: 16 U/L (ref 12–45)
BACTERIA #/AREA URNS HPF: NEGATIVE /HPF
BASOPHILS # BLD AUTO: 0.2 % (ref 0–1.8)
BASOPHILS # BLD: 0.03 K/UL (ref 0–0.12)
BILIRUB SERPL-MCNC: 0.8 MG/DL (ref 0.1–1.5)
BILIRUB UR QL STRIP.AUTO: NEGATIVE
BUN SERPL-MCNC: 24 MG/DL (ref 8–22)
CALCIUM SERPL-MCNC: 7.4 MG/DL (ref 8.5–10.5)
CHLORIDE SERPL-SCNC: 99 MMOL/L (ref 96–112)
CO2 SERPL-SCNC: 35 MMOL/L (ref 20–33)
COLOR UR: YELLOW
CREAT SERPL-MCNC: 1.01 MG/DL (ref 0.5–1.4)
DIGOXIN SERPL-MCNC: 0.8 NG/ML (ref 0.8–2)
DIGOXIN SERPL-MCNC: >5 NG/ML (ref 0.8–2)
EKG IMPRESSION: NORMAL
EOSINOPHIL # BLD AUTO: 0.9 K/UL (ref 0–0.51)
EOSINOPHIL NFR BLD: 4.7 % (ref 0–6.9)
EPI CELLS #/AREA URNS HPF: NEGATIVE /HPF
ERYTHROCYTE [DISTWIDTH] IN BLOOD BY AUTOMATED COUNT: 54.7 FL (ref 35.9–50)
GLOBULIN SER CALC-MCNC: 2.2 G/DL (ref 1.9–3.5)
GLUCOSE BLD-MCNC: 104 MG/DL (ref 65–99)
GLUCOSE BLD-MCNC: 110 MG/DL (ref 65–99)
GLUCOSE BLD-MCNC: 119 MG/DL (ref 65–99)
GLUCOSE BLD-MCNC: 95 MG/DL (ref 65–99)
GLUCOSE SERPL-MCNC: 120 MG/DL (ref 65–99)
GLUCOSE UR STRIP.AUTO-MCNC: NEGATIVE MG/DL
HCT VFR BLD AUTO: 38 % (ref 42–52)
HGB BLD-MCNC: 11.9 G/DL (ref 14–18)
HYALINE CASTS #/AREA URNS LPF: ABNORMAL /LPF
IMM GRANULOCYTES # BLD AUTO: 0.34 K/UL (ref 0–0.11)
IMM GRANULOCYTES NFR BLD AUTO: 1.8 % (ref 0–0.9)
KETONES UR STRIP.AUTO-MCNC: NEGATIVE MG/DL
LEUKOCYTE ESTERASE UR QL STRIP.AUTO: NEGATIVE
LYMPHOCYTES # BLD AUTO: 2.6 K/UL (ref 1–4.8)
LYMPHOCYTES NFR BLD: 13.6 % (ref 22–41)
MAGNESIUM SERPL-MCNC: 1.3 MG/DL (ref 1.5–2.5)
MCH RBC QN AUTO: 26.4 PG (ref 27–33)
MCHC RBC AUTO-ENTMCNC: 31.3 G/DL (ref 33.7–35.3)
MCV RBC AUTO: 84.3 FL (ref 81.4–97.8)
MICRO URNS: ABNORMAL
MONOCYTES # BLD AUTO: 1.26 K/UL (ref 0–0.85)
MONOCYTES NFR BLD AUTO: 6.6 % (ref 0–13.4)
NEUTROPHILS # BLD AUTO: 13.96 K/UL (ref 1.82–7.42)
NEUTROPHILS NFR BLD: 73.1 % (ref 44–72)
NITRITE UR QL STRIP.AUTO: NEGATIVE
NRBC # BLD AUTO: 0.07 K/UL
NRBC BLD-RTO: 0.4 /100 WBC
PH UR STRIP.AUTO: 5 [PH] (ref 5–8)
PLATELET # BLD AUTO: 253 K/UL (ref 164–446)
PMV BLD AUTO: 9.6 FL (ref 9–12.9)
POTASSIUM SERPL-SCNC: 3.4 MMOL/L (ref 3.6–5.5)
PROT SERPL-MCNC: 4.7 G/DL (ref 6–8.2)
PROT UR QL STRIP: 30 MG/DL
RBC # BLD AUTO: 4.51 M/UL (ref 4.7–6.1)
RBC # URNS HPF: ABNORMAL /HPF
RBC UR QL AUTO: ABNORMAL
SODIUM SERPL-SCNC: 138 MMOL/L (ref 135–145)
SP GR UR STRIP.AUTO: 1.02
URATE CRY #/AREA URNS HPF: POSITIVE /HPF
UROBILINOGEN UR STRIP.AUTO-MCNC: 0.2 MG/DL
WBC # BLD AUTO: 19.1 K/UL (ref 4.8–10.8)
WBC #/AREA URNS HPF: ABNORMAL /HPF

## 2020-11-17 PROCEDURE — 700111 HCHG RX REV CODE 636 W/ 250 OVERRIDE (IP): Performed by: HOSPITALIST

## 2020-11-17 PROCEDURE — 81001 URINALYSIS AUTO W/SCOPE: CPT

## 2020-11-17 PROCEDURE — 700111 HCHG RX REV CODE 636 W/ 250 OVERRIDE (IP): Performed by: NURSE PRACTITIONER

## 2020-11-17 PROCEDURE — A9270 NON-COVERED ITEM OR SERVICE: HCPCS | Performed by: INTERNAL MEDICINE

## 2020-11-17 PROCEDURE — 74176 CT ABD & PELVIS W/O CONTRAST: CPT

## 2020-11-17 PROCEDURE — 93005 ELECTROCARDIOGRAM TRACING: CPT | Performed by: HOSPITALIST

## 2020-11-17 PROCEDURE — 99233 SBSQ HOSP IP/OBS HIGH 50: CPT | Mod: GC | Performed by: HOSPITALIST

## 2020-11-17 PROCEDURE — 80162 ASSAY OF DIGOXIN TOTAL: CPT

## 2020-11-17 PROCEDURE — 700102 HCHG RX REV CODE 250 W/ 637 OVERRIDE(OP): Performed by: STUDENT IN AN ORGANIZED HEALTH CARE EDUCATION/TRAINING PROGRAM

## 2020-11-17 PROCEDURE — 700117 HCHG RX CONTRAST REV CODE 255: Performed by: SURGERY

## 2020-11-17 PROCEDURE — 99221 1ST HOSP IP/OBS SF/LOW 40: CPT | Performed by: STUDENT IN AN ORGANIZED HEALTH CARE EDUCATION/TRAINING PROGRAM

## 2020-11-17 PROCEDURE — 700102 HCHG RX REV CODE 250 W/ 637 OVERRIDE(OP): Performed by: INTERNAL MEDICINE

## 2020-11-17 PROCEDURE — 770020 HCHG ROOM/CARE - TELE (206)

## 2020-11-17 PROCEDURE — 99233 SBSQ HOSP IP/OBS HIGH 50: CPT | Performed by: INTERNAL MEDICINE

## 2020-11-17 PROCEDURE — A9270 NON-COVERED ITEM OR SERVICE: HCPCS | Performed by: STUDENT IN AN ORGANIZED HEALTH CARE EDUCATION/TRAINING PROGRAM

## 2020-11-17 PROCEDURE — 71045 X-RAY EXAM CHEST 1 VIEW: CPT

## 2020-11-17 PROCEDURE — 82962 GLUCOSE BLOOD TEST: CPT

## 2020-11-17 PROCEDURE — 700111 HCHG RX REV CODE 636 W/ 250 OVERRIDE (IP): Performed by: STUDENT IN AN ORGANIZED HEALTH CARE EDUCATION/TRAINING PROGRAM

## 2020-11-17 PROCEDURE — 700111 HCHG RX REV CODE 636 W/ 250 OVERRIDE (IP): Performed by: INTERNAL MEDICINE

## 2020-11-17 PROCEDURE — 80053 COMPREHEN METABOLIC PANEL: CPT

## 2020-11-17 PROCEDURE — A9270 NON-COVERED ITEM OR SERVICE: HCPCS | Performed by: HOSPITALIST

## 2020-11-17 PROCEDURE — 700105 HCHG RX REV CODE 258: Performed by: INTERNAL MEDICINE

## 2020-11-17 PROCEDURE — 85025 COMPLETE CBC W/AUTO DIFF WBC: CPT

## 2020-11-17 PROCEDURE — 700102 HCHG RX REV CODE 250 W/ 637 OVERRIDE(OP): Performed by: HOSPITALIST

## 2020-11-17 PROCEDURE — 700105 HCHG RX REV CODE 258: Performed by: HOSPITALIST

## 2020-11-17 PROCEDURE — 51798 US URINE CAPACITY MEASURE: CPT

## 2020-11-17 PROCEDURE — 700111 HCHG RX REV CODE 636 W/ 250 OVERRIDE (IP)

## 2020-11-17 PROCEDURE — 93010 ELECTROCARDIOGRAM REPORT: CPT | Mod: 26 | Performed by: INTERNAL MEDICINE

## 2020-11-17 PROCEDURE — 83735 ASSAY OF MAGNESIUM: CPT

## 2020-11-17 PROCEDURE — 700111 HCHG RX REV CODE 636 W/ 250 OVERRIDE (IP): Performed by: SURGERY

## 2020-11-17 RX ORDER — METRONIDAZOLE 500 MG/1
500 TABLET ORAL EVERY 8 HOURS
Status: DISCONTINUED | OUTPATIENT
Start: 2020-11-17 | End: 2020-11-20

## 2020-11-17 RX ORDER — MAGNESIUM SULFATE HEPTAHYDRATE 40 MG/ML
4 INJECTION, SOLUTION INTRAVENOUS ONCE
Status: COMPLETED | OUTPATIENT
Start: 2020-11-17 | End: 2020-11-17

## 2020-11-17 RX ORDER — SPIRONOLACTONE 50 MG/1
25 TABLET, FILM COATED ORAL
Status: DISCONTINUED | OUTPATIENT
Start: 2020-11-17 | End: 2020-12-14

## 2020-11-17 RX ORDER — PROCHLORPERAZINE MALEATE 5 MG/1
5 TABLET ORAL ONCE
Status: COMPLETED | OUTPATIENT
Start: 2020-11-17 | End: 2020-11-18

## 2020-11-17 RX ORDER — FAMOTIDINE 20 MG/1
20 TABLET, FILM COATED ORAL DAILY
Status: DISCONTINUED | OUTPATIENT
Start: 2020-11-17 | End: 2020-12-15

## 2020-11-17 RX ORDER — LISINOPRIL 10 MG/1
10 TABLET ORAL
Status: DISCONTINUED | OUTPATIENT
Start: 2020-11-17 | End: 2020-11-20

## 2020-11-17 RX ORDER — OLANZAPINE 5 MG/1
5 TABLET ORAL
Status: DISCONTINUED | OUTPATIENT
Start: 2020-11-17 | End: 2020-11-19

## 2020-11-17 RX ORDER — POTASSIUM CHLORIDE 20 MEQ/1
40 TABLET, EXTENDED RELEASE ORAL ONCE
Status: DISCONTINUED | OUTPATIENT
Start: 2020-11-17 | End: 2020-11-17

## 2020-11-17 RX ORDER — METOPROLOL SUCCINATE 50 MG/1
50 TABLET, EXTENDED RELEASE ORAL
Status: DISCONTINUED | OUTPATIENT
Start: 2020-11-17 | End: 2020-11-18

## 2020-11-17 RX ORDER — CETIRIZINE HYDROCHLORIDE 10 MG/1
10 TABLET ORAL DAILY
Status: DISCONTINUED | OUTPATIENT
Start: 2020-11-17 | End: 2020-12-17 | Stop reason: HOSPADM

## 2020-11-17 RX ADMIN — FENTANYL CITRATE 50 MCG: 50 INJECTION, SOLUTION INTRAMUSCULAR; INTRAVENOUS at 01:57

## 2020-11-17 RX ADMIN — CETIRIZINE HYDROCHLORIDE 10 MG: 10 TABLET, FILM COATED ORAL at 17:15

## 2020-11-17 RX ADMIN — VANCOMYCIN HYDROCHLORIDE 3 G: 500 INJECTION, POWDER, LYOPHILIZED, FOR SOLUTION INTRAVENOUS at 11:04

## 2020-11-17 RX ADMIN — METRONIDAZOLE 500 MG: 500 TABLET ORAL at 21:24

## 2020-11-17 RX ADMIN — FENTANYL CITRATE 50 MCG: 50 INJECTION, SOLUTION INTRAMUSCULAR; INTRAVENOUS at 07:44

## 2020-11-17 RX ADMIN — HEPARIN SODIUM 5000 UNITS: 5000 INJECTION, SOLUTION INTRAVENOUS; SUBCUTANEOUS at 13:41

## 2020-11-17 RX ADMIN — LINEZOLID 600 MG: 600 TABLET, FILM COATED ORAL at 06:36

## 2020-11-17 RX ADMIN — CEFTRIAXONE SODIUM 2 G: 2 INJECTION, POWDER, FOR SOLUTION INTRAMUSCULAR; INTRAVENOUS at 11:05

## 2020-11-17 RX ADMIN — FENTANYL CITRATE 50 MCG: 50 INJECTION, SOLUTION INTRAMUSCULAR; INTRAVENOUS at 21:24

## 2020-11-17 RX ADMIN — FAMOTIDINE 20 MG: 20 TABLET, FILM COATED ORAL at 17:15

## 2020-11-17 RX ADMIN — FENTANYL CITRATE 50 MCG: 50 INJECTION, SOLUTION INTRAMUSCULAR; INTRAVENOUS at 14:47

## 2020-11-17 RX ADMIN — SERTRALINE 125 MG: 100 TABLET, FILM COATED ORAL at 06:36

## 2020-11-17 RX ADMIN — HEPARIN SODIUM 5000 UNITS: 5000 INJECTION, SOLUTION INTRAVENOUS; SUBCUTANEOUS at 06:35

## 2020-11-17 RX ADMIN — ONDANSETRON 4 MG: 4 TABLET, ORALLY DISINTEGRATING ORAL at 00:17

## 2020-11-17 RX ADMIN — IOHEXOL 25 ML: 240 INJECTION, SOLUTION INTRATHECAL; INTRAVASCULAR; INTRAVENOUS; ORAL at 14:11

## 2020-11-17 RX ADMIN — OLANZAPINE 5 MG: 5 TABLET, FILM COATED ORAL at 21:24

## 2020-11-17 RX ADMIN — METRONIDAZOLE 500 MG: 500 TABLET ORAL at 13:41

## 2020-11-17 RX ADMIN — MAGNESIUM SULFATE IN WATER 4 G: 40 INJECTION, SOLUTION INTRAVENOUS at 08:42

## 2020-11-17 RX ADMIN — ONDANSETRON 4 MG: 4 TABLET, ORALLY DISINTEGRATING ORAL at 07:42

## 2020-11-17 RX ADMIN — INSULIN GLARGINE 10 UNITS: 100 INJECTION, SOLUTION SUBCUTANEOUS at 06:29

## 2020-11-17 RX ADMIN — HEPARIN SODIUM 5000 UNITS: 5000 INJECTION, SOLUTION INTRAVENOUS; SUBCUTANEOUS at 21:24

## 2020-11-17 RX ADMIN — FLUCONAZOLE 400 MG: 200 TABLET ORAL at 06:36

## 2020-11-17 ASSESSMENT — ENCOUNTER SYMPTOMS
MYALGIAS: 0
COUGH: 0
HEARTBURN: 0
NECK PAIN: 0
CHILLS: 1
SORE THROAT: 0
HEADACHES: 0
ABDOMINAL PAIN: 1
HEMOPTYSIS: 0
DEPRESSION: 0
NAUSEA: 1
MYALGIAS: 1
DOUBLE VISION: 0
CHILLS: 0
DIZZINESS: 1
HALLUCINATIONS: 1
HEADACHES: 1
BRUISES/BLEEDS EASILY: 0
POLYDIPSIA: 0
VOMITING: 1
CONSTIPATION: 0
DIARRHEA: 0
DIZZINESS: 0
SHORTNESS OF BREATH: 0
PALPITATIONS: 0
BLURRED VISION: 0
VOMITING: 0
FEVER: 0

## 2020-11-17 ASSESSMENT — PAIN DESCRIPTION - PAIN TYPE
TYPE: ACUTE PAIN
TYPE: ACUTE PAIN

## 2020-11-17 NOTE — PROGRESS NOTES
Surgeon paged and notified that pt's BETTE drain has put out 450mL since 0100 and additionally soaked the bed from leaking around the drain. No new orders.

## 2020-11-17 NOTE — DISCHARGE PLANNING
Anticipated Discharge Disposition: SNF    Action: LSW spoke to patient at bedside and explained therapy recommendations for SNF. Patient is agreeable to SNF and reports that he has been to [facility] in the past. LSW to fax choice form to ALY Gutierrez.    Barriers to Discharge: acceptance, bed availability, transportation    Plan: Care coordination to assist as needed.

## 2020-11-17 NOTE — ASSESSMENT & PLAN NOTE
History of ischemic cardiomyopathy with ejection fraction 30/40%, no acute decompensation during this admission  With right ventricle dysfunction  Continue metoprolol and lisinopril and Aldactone  Continue amiodarone for atrial fibrillation  Patient Bicarb trending up will decrease lasix to 20 mg once dailiy  Weight daily  I and O -1800 adequate diuresis

## 2020-11-17 NOTE — DISCHARGE PLANNING
Received Choice form at 0314  Agency[facility]Facility Name: [facility], [facility]. [facility].  Referral sent per Choice form @ [phone]

## 2020-11-17 NOTE — ASSESSMENT & PLAN NOTE
Segmental colectomy, colostomy creation, mobilization of splenic flexure, wound VAC placement, irrigation and debridement of flank wound 11/10/2020  Finished course of IV Vancomycin, Rocephin, Diflucan   Wound care  Pain control  -ostomy working well  -incision site appears ok

## 2020-11-17 NOTE — NON-PROVIDER
Daily Progress Note:     Date of Service: 11/17/2020  Primary Team: [employer]  Red Team  and R  Yellow Team   Attending: JONATHAN Myrick M.D.   Senior Resident: Dr. Peace  Medical Student: Layo Harp  Contact:  [phone]    Chief Complaint:   Intraabdominal abscess with perforated splenic flexure    ID:  Patient is a 55 y.o. male who underwent a colectomy and ostomy placement for an intraabdominal abscess with perforated splenic flexure with a PMH of afib, DM2, hyperlipidemia and CHF.  The patient also began to have digoxin toxicity on 11/16.    Subjective:   Overnight patient began to have nausea, vomiting, and visual hallucinations seeing yellows shapes in the air.  His digoxin level was checked and was found to be >5.0. Digoxin antibodies were not needed later due to his levels lowering to 0.8.  This morning his symptoms have improved but he still has nausea that comes and go and some yellow hallucinations.  He also reports more fatigue and chills with a dull pain over his L abdomen that is worse than before.  Patient continued to have Afib last night.        Consultants/Specialty:  ID  Surgery  Cardio  Nephro  Review of Systems:    Review of Systems   Constitutional: Positive for chills and malaise/fatigue.   HENT: Negative for hearing loss.    Eyes: Negative for blurred vision and double vision.   Respiratory: Negative for cough.    Cardiovascular: Negative for chest pain.   Gastrointestinal: Positive for abdominal pain, nausea and vomiting.   Skin: Negative for rash.   Neurological: Positive for dizziness and headaches.   Psychiatric/Behavioral: Positive for hallucinations (Yellow circles and squares).       Objective:   Physical Exam:   Vitals:   Temp:  [35.9 °C (96.7 °F)-36.4 °C (97.6 °F)] 36.4 °C (97.6 °F)  Pulse:  [] 92  Resp:  [16-18] 18  BP: (127-162)/(75-85) 129/84  SpO2:  [92 %-97 %] 95 %    Physical Exam  Constitutional:       Appearance: He is obese.   HENT:      Mouth/Throat:       Mouth: Mucous membranes are moist.   Eyes:      Extraocular Movements: Extraocular movements intact.      Pupils: Pupils are equal, round, and reactive to light.   Cardiovascular:      Rate and Rhythm: Normal rate.      Heart sounds: Normal heart sounds.   Abdominal:      General: Abdomen is flat.      Palpations: Abdomen is soft.      Comments: Ostomy bag in place over L abdomen   Skin:     General: Skin is warm and dry.      Capillary Refill: Capillary refill takes less than 2 seconds.   Neurological:      General: No focal deficit present.      Mental Status: He is alert.           Labs:   Recent Labs     11/15/20  0415 11/16/20  0535 11/17/20  0444   WBC 9.1 13.1* 19.1*   RBC 4.13* 4.39* 4.51*   HEMOGLOBIN 10.6* 11.0* 11.9*   HEMATOCRIT 34.2* 35.7* 38.0*   MCV 82.8 81.3* 84.3   MCH 25.7* 25.1* 26.4*   RDW 54.1* 53.4* 54.7*   PLATELETCT 288 318 253   MPV 9.9 10.0 9.6   NEUTSPOLYS 76.80* 67.60 73.10*   LYMPHOCYTES 13.50* 18.90* 13.60*   MONOCYTES 7.10 8.70 6.60   EOSINOPHILS 0.00 1.60 4.70   BASOPHILS 0.20 0.40 0.20     Recent Labs     11/15/20  0415 11/16/20  0535 11/17/20  0444   SODIUM 136 139 138   POTASSIUM 4.1 3.8 3.4*   CHLORIDE 99 100 99   CO2 29 32 35*   GLUCOSE 226* 160* 120*   BUN 38* 31* 24*     Recent Labs     11/15/20  0415 11/16/20  0535 11/17/20  0444   ALBUMIN 2.7* 2.7* 2.5*   TBILIRUBIN 0.5 0.6 0.8   ALKPHOSPHAT 70 75 65   TOTPROTEIN 5.4* 5.4* 4.7*   ALTSGPT 8 9 9   ASTSGOT 22 23 16   CREATININE 2.10* 1.33 1.01       Imaging:   DX-CHEST-PORTABLE (1 VIEW)   Final Result      1.  Bilateral airspace and interstitial infiltrates.      2.  Cardiomegaly.      US-RENAL   Final Result    Examination was limited due to poor sound penetration related to body habitus.   Grossly normal appearance of the kidneys.      EC-ECHOCARDIOGRAM COMPLETE W/ CONT   Final Result      DX-CHEST-PORTABLE (1 VIEW)   Final Result      No pneumothorax identified following right subclavian line placement      Worsening  atelectasis with consolidation at the bases not excluded      Stable cardiac silhouette enlargement      CT-ABDOMEN-PELVIS WITH   Final Result      Interval decrease in size of perisplenic abscess. Pigtail catheter now in place      Abscess has likely fistulous communication with the abutting splenic flexure of the colon. Colon is otherwise normal in appearance      Mild hepatosplenomegaly      Decreasing small left pleural effusion      Subacute anterior wedge compression fractures in the caudal thoracic spine      CT-DRAIN-PERITONEAL   Final Result      1.  CT guided perisplenic abscess catheter drainage.   2.  The current plan is to obtain a follow-up CT in 5-7 days..      OUTSIDE IMAGES-CT CHEST   Final Result      OUTSIDE IMAGES-CT ABDOMEN /PELVIS   Final Result      CT-ABDOMEN-PELVIS WITH   Final Result   Addendum 1 of 1   Addendum:   A prior noncontrast CT of the abdomen and pelvis from [facility] dated 10/19/2020 was made available for comparison. On the    prior study a perisplenic/subcapsular fluid collection was seen however    did not contain air at that time and    there was no discrete evidence of communication with the transverse colon.    Communication with splenic flexure of the colon and air within the    collection are new from the prior study.      Final      DX-CHEST-PORTABLE (1 VIEW)   Final Result      Slight interval interval improvement in congestive heart failure.      CT-ABDOMEN-PELVIS W/O    (Results Pending)       Assessment and Plan:    #Digoxin Toxicity   Patient was started on digoxin for CHF with normal levels of 1.5 on 11/14.  Patient began to have worsening nausea and developed vomiting and hallucinating yellow shapes beginning 11/16.  He was found to have a digoxin level >5 and digoxin was discontinued.  His levels lowered to 0.8 today (11/17) this morning.  The increase in levels may have been due to concurrent usage of fluconazole a etz520 inhibitor. No  hyperkalemia noted on morning labs.    -Digoxin antibodies no longer needed as level has decreased  -Patient on telemetry due to concern over arrhythmia caused by digoxin    #Post op for perforation of colon with colectomy and ostomy  Patient has increased fatigue and chills this morning with increased leukocytosis (19.1) concerning for new infection vs stress related leukocytosis.  Patient has been afebrile for the last 24 hours.  Enterococcus faecalis and candida albicans grew from surgical culture.    -Linezolid was stopped and vancomycin started with possible risk of red man syndrome, infusion should be slow due to previous episode  -ID recommends starting IV ceftriaxone and metronidazole   -Fluconazole continued  -Repeat CBC next AM  -Surgery ordered chest x-ray and CT of pelvis    #A-fib with RVR  Patient was not a candidate for anticoagulation due to frequent falls from orthostatic hypotension.  Digoxin was started but is no longer to be used to due to ongoing toxicity.    -Recommend using metoprolol    #QTC prolongation  Patient was found to have a QTC prolongation of 535.  Cause may be due to multiple medications including sertraline, olanzapine, fluconazole and zofran.  Risk of Torsades.    -Zofran discontinued  -Sertraline dose lowered    #Left Ventricular heart failure  Patient has left ventricular ejection fraction estimated to be 35% with normal size. Patient sustained SRUTHI leading to discontinuation of lisinopril and spironolactone      -Cardiology recommended Toprol-XL, lisinopril, spirolactone and may be able to be continued now that SRUTHI has improved    #Type 2 DM  Patient's blood glucose is well controlled with ranges under 150 per ID    -Continue current sliding scale insulin regimen  -BG checks every 6 hours

## 2020-11-17 NOTE — ASSESSMENT & PLAN NOTE
Seen by cardiac electrophysiologist in 2019 and deemed not to be good candidate for anticoagulation due to frequent falls 2/2 orthostatic hypotension  CHADSVASC of 3   He was given digoxin however discontinued due to toxicity  Heart rate is controlled  Continue metoprolol XL 75 mg twice daily and amiodarone 20 mg daily  Continue holding anticoagulation at this time for possible surgery  To follow-up with cardiologist as outpatient

## 2020-11-17 NOTE — ASSESSMENT & PLAN NOTE
History of schizophrenia on olanzapine and sertraline.  Developed hallucination during this hospitalization, also had delayed prolonged QTC  Evaluated by psychiatrist and started with risperidone and modafinil, discontinue Zoloft and olanzapine  Stable at this time continue monitoring and follow-up with psychiatrist.  Patient reported feeling suicidal on 11/30/2020, patient placed on legal hold and psych consult placed

## 2020-11-17 NOTE — PROGRESS NOTES
Patient A & O x4, denies pain.     BETTE dressing and LUQ dressing changed per order. LUQ wound packed with silver strip packing, covered with gauze and hypafix. Pt tolerated well. Wound RNs at bedside during change.     Pt reporting mild nausea throughout the day with diminished appetite. Tolerating 50% of breakfast and lunch.     Zabala d/c per order by Maritza RN. Patient tolerated well, has voided since. Using urinal.      Ostomy + output. Dressing changed with wound team.   Bette draining serosang fluid, bulb compressed.     Q2 turns, low air loss mattress, patient using rails and overbed to reposition self.   Frequent rounding in place.

## 2020-11-17 NOTE — PROGRESS NOTES
Daily Progress Note:     Date of Service: 11/17/2020  Primary Team: [facility] Yellow Team   Attending: JONATHAN Myrick M.D.   Senior Resident: Dr. Peace    Contact:  [phone]    ID: 55-year-old male with past medical history of obesity BMI of 53,  type 2 diabetes mellitus,  atrial fibrillation, congestive heart failure EF of 30% 11/2020 schizophrenia with depression had a history of ground-level fall 6 weeks ago CT abdomen showed subcapsular splenic hematoma seen and discharged on oral antibiotic.  Patient presented on 11/3 with history of increasing abdominal pain, CT abdomen at that time showed intra-abdominal abscess admitted for surgery and got surgery done showed perforated splenic flexure and received colectomy on 11/10 with ostomy placement.  Patient had A. fib with RVR started on digoxin by cardiology on 11/14 levels checked 11/14 1.5 and 11/16 >5.  Transfer to telemetry for further management.  Did not receive antibody to digoxin but repeat digoxin level is 0.8.    Subjective: still feel nausea, hallucinations, did not vomit today. Denies any fever,    Consultants/Specialty:  Infectious diseases  Nephrology  Cardiology  General surgery      Review of Systems:    Review of Systems   Constitutional: Positive for malaise/fatigue. Negative for chills and fever.   HENT: Negative for hearing loss and sore throat.    Eyes: Negative for blurred vision and double vision.   Respiratory: Negative for cough, hemoptysis and shortness of breath.    Cardiovascular: Negative for chest pain and palpitations.   Gastrointestinal: Positive for abdominal pain and nausea. Negative for diarrhea and heartburn.   Genitourinary: Negative for dysuria and urgency.   Musculoskeletal: Positive for myalgias. Negative for neck pain.   Skin: Negative for itching and rash.   Neurological: Negative for dizziness and headaches.        Seeing yellow colored halos and squares   Endo/Heme/Allergies: Negative for polydipsia. Does not  bruise/bleed easily.   Psychiatric/Behavioral: Negative for depression and suicidal ideas.       Objective Data:   Physical Exam:   Vitals:   Temp:  [35.9 °C (96.7 °F)-36.4 °C (97.6 °F)] 36.4 °C (97.6 °F)  Pulse:  [] 92  Resp:  [16-18] 18  BP: (127-162)/(75-85) 129/84  SpO2:  [92 %-97 %] 95 %     Physical Exam  Constitutional:       Appearance: He is obese. He is ill-appearing.   HENT:      Head: Normocephalic and atraumatic.      Right Ear: Tympanic membrane normal.      Left Ear: Tympanic membrane normal.      Nose: Nose normal. No congestion.      Mouth/Throat:      Mouth: Mucous membranes are moist.      Pharynx: Oropharynx is clear. No oropharyngeal exudate.   Eyes:      General: No scleral icterus.     Extraocular Movements: Extraocular movements intact.      Pupils: Pupils are equal, round, and reactive to light.   Neck:      Musculoskeletal: Normal range of motion. No neck rigidity.   Cardiovascular:      Rate and Rhythm: Normal rate and regular rhythm.      Pulses: Normal pulses.      Heart sounds: Normal heart sounds.   Pulmonary:      Effort: No respiratory distress.      Breath sounds: Normal breath sounds.   Abdominal:      General: Bowel sounds are normal. There is distension.      Palpations: Abdomen is soft.      Tenderness: There is abdominal tenderness.      Comments: Colostomy+ clean wound   Musculoskeletal:      Right lower leg: Edema present.      Left lower leg: Edema present.   Skin:     General: Skin is warm.      Coloration: Skin is not jaundiced.   Neurological:      General: No focal deficit present.      Mental Status: He is alert and oriented to person, place, and time.      Cranial Nerves: No cranial nerve deficit.   Psychiatric:         Mood and Affect: Mood normal.         Behavior: Behavior normal.           Labs:   Recent Labs     11/15/20  0415 11/16/20  0535 11/17/20  0444   WBC 9.1 13.1* 19.1*   RBC 4.13* 4.39* 4.51*   HEMOGLOBIN 10.6* 11.0* 11.9*   HEMATOCRIT 34.2* 35.7*  38.0*   MCV 82.8 81.3* 84.3   MCH 25.7* 25.1* 26.4*   RDW 54.1* 53.4* 54.7*   PLATELETCT 288 318 253   MPV 9.9 10.0 9.6   NEUTSPOLYS 76.80* 67.60 73.10*   LYMPHOCYTES 13.50* 18.90* 13.60*   MONOCYTES 7.10 8.70 6.60   EOSINOPHILS 0.00 1.60 4.70   BASOPHILS 0.20 0.40 0.20     Recent Labs     11/15/20  0415 11/16/20  0535 11/17/20  0444   SODIUM 136 139 138   POTASSIUM 4.1 3.8 3.4*   CHLORIDE 99 100 99   CO2 29 32 35*   GLUCOSE 226* 160* 120*   BUN 38* 31* 24*     Recent Labs     11/15/20  0415 11/16/20  0535 11/17/20  0444   ALBUMIN 2.7* 2.7* 2.5*   TBILIRUBIN 0.5 0.6 0.8   ALKPHOSPHAT 70 75 65   TOTPROTEIN 5.4* 5.4* 4.7*   ALTSGPT 8 9 9   ASTSGOT 22 23 16   CREATININE 2.10* 1.33 1.01     Imaging:   DX-CHEST-PORTABLE (1 VIEW)   Final Result      1.  Bilateral airspace and interstitial infiltrates.      2.  Cardiomegaly.      US-RENAL   Final Result    Examination was limited due to poor sound penetration related to body habitus.   Grossly normal appearance of the kidneys.      EC-ECHOCARDIOGRAM COMPLETE W/ CONT   Final Result      DX-CHEST-PORTABLE (1 VIEW)   Final Result      No pneumothorax identified following right subclavian line placement      Worsening atelectasis with consolidation at the bases not excluded      Stable cardiac silhouette enlargement      CT-ABDOMEN-PELVIS WITH   Final Result      Interval decrease in size of perisplenic abscess. Pigtail catheter now in place      Abscess has likely fistulous communication with the abutting splenic flexure of the colon. Colon is otherwise normal in appearance      Mild hepatosplenomegaly      Decreasing small left pleural effusion      Subacute anterior wedge compression fractures in the caudal thoracic spine      CT-DRAIN-PERITONEAL   Final Result      1.  CT guided perisplenic abscess catheter drainage.   2.  The current plan is to obtain a follow-up CT in 5-7 days..      OUTSIDE IMAGES-CT CHEST   Final Result      OUTSIDE IMAGES-CT ABDOMEN /PELVIS   Final  Result      CT-ABDOMEN-PELVIS WITH   Final Result   Addendum 1 of 1   Addendum:   A prior noncontrast CT of the abdomen and pelvis from [facility] dated 10/19/2020 was made available for comparison. On the    prior study a perisplenic/subcapsular fluid collection was seen however    did not contain air at that time and    there was no discrete evidence of communication with the transverse colon.    Communication with splenic flexure of the colon and air within the    collection are new from the prior study.      Final      DX-CHEST-PORTABLE (1 VIEW)   Final Result      Slight interval interval improvement in congestive heart failure.      CT-ABDOMEN-PELVIS W/O    (Results Pending)       * Elevated digoxin level  Assessment & Plan  H/o N/v and visual hallucinations including yellow halos, circles and squares  AFTER STARTING digoxin and 11/14 digoxin level 1.5 & on 11/17 Repeat digoxin level > 5,   & 0.8  Patient has N/V and hallucinations.  - MONITOR  - Dc DIGOXIN      Leucocytosis  Assessment & Plan  Patient with PERF and Sx and   -Blood cultures on 11/31/2 + viridans streptococci, possible contaminant  -Blood culture on 11/5 with no growth to date   - wound culture showing enterococcus, and candida  Started on Zyvox and fluconazole  Still elevating WBC  - DC Zyvox.  - Start vancomycin (11/17) per pharmacy  - IV C3 and flagyl per ID.    Spontaneous perforation of colon (HCC)- (present on admission)  Assessment & Plan  Admitted with LUQ abscess and suspicion of perforation at splenic flexure  11/9 Drain with stool and unable to hold suction  11/10 Segmental colectomy, colostomy, Patti pouch, I & D of abdominal wall abscess  Kin Desouza DO General Surgery      SRUTHI (acute kidney injury) (HCC)  Assessment & Plan  improving    LV dysfunction- (present on admission)  Assessment & Plan  BP low after abscess drainage  11/11 Echocardiogram completed with normal left ventricular chamber  size.  Moderately reduced left ventricular systolic function.  Left ventricular ejection fraction is visually estimated to be 30-40%  Cardiology recs: to continue Toprol-XL 50 mg qd,  lisinopril 10 mg qd, spironolactone 25 qd    Paroxysmal A-fib with RVR (HCC)- (present on admission)  Assessment & Plan  Seen by cardiac electrophysiologist in 2019 and deemed not to be good candidate for anticoagulation due to frequent falls 2/2 orthostatic hypotension  CHADSVASC of 3 To start on metoprolol XL 50 mg dailystarted digoxin and 11/14 digoxin level 1.5 & on 11/17 Repeat digoxin level > 5,   & 0.8  Patient has N/V and hallucinations.      Schizophrenia ([facility])  Assessment & Plan  Patient with H/O schizophrenia on Olanzapine and sertraline.  - continue  - avoid other QTc prolonging meds.    Type 2 diabetes mellitus, without long-term current use of insulin, with peripheral neuropathy (HCC)- (present on admission)  Assessment & Plan  Patient states blood sugar well controlled at home with metformin. Last a1c of 7.8 on 11/10/20   POC glucose 100s - 140s.  - Sliding scale insulin with hypoglycemia protocol  - Ensure strict glucose control for proper infection control, <150 requested per ID

## 2020-11-17 NOTE — CONSULTS
Hospital Medicine Consultation    Date of Service  11/17/2020    Referring Physician  Monik Navas M.D.    Consulting Physician  Glenna Cuevas M.D.    Reason for Consultation  Hallucinations, n/v, elevated Digoxin level > 5.0    History of Presenting Illness  55 y.o. morbidly obese male with BMI of 53.5, and history of diabetes mellitus type 2, hyperlipidemia, A. fib, CHF, who underwent a ground-level fall against the side of his bathtub approximately 6 weeks ago and a CT of the abdomen that was performed at this time showing a subcapsular splenic hematoma.  Patient was discharged with oral antibiotics and then presented to the hospital at [facility] on 11/3/2020 due to increased pain at his abdomen. CT abdomen and pelvis showed an intraabdominal abscess. Patient underwent surgical intervention for perforated splenic flexure and received colectomy on 11/10 with ostomy placement.    Infectious disease, cardiology, and nephrology have been consulted throughout his stay.   His recent echocardiogram 11/11 showed LV function of 30% which is reduced from prior echocardiogram done in 2018.  Mild mitral regurgitation.    Patient experienced A. fib with RVR during his stay with the rate of 131 and was started on digoxin by cardiology.  Digoxin levels were checked on 11/14 and 11/16 and were 1.5 and > 5.0 respectively.  The hospitalist team is consulted for medical management by general surgery this evening following elevated Digoxin levels and patient's new onset complaints of n/v, and visual hallucinations including yellow squares and circles in the air.  Patient states that he has been unable to eat in the past 2 days due to nausea and has been dry heaving intermittently.  Patient states that the visual yellow images have worsened over the past 24 hours.  Patient is transferred to telemetry, and stat EKG is ordered.  Patient is discussed with pharmacy who will repeat Digoxin levels at 3am.  Digifab  will be ordered in conjunction with the pharmacy following repeat levels.  Patient is hemodynamically stable with BP of 140/79 and HR of 87.    We will continue to assist in management of this complex patient.      Review of Systems  Review of Systems   Constitutional: Positive for malaise/fatigue.        Reduced appetite   Gastrointestinal: Positive for abdominal pain, nausea and vomiting. Negative for constipation and diarrhea.   Psychiatric/Behavioral: Positive for hallucinations.   All other systems reviewed and are negative.      Past Medical History   has a past medical history of A-fib (formerly [facility]), Abscess (11/8/2020), Bacteremia (11/8/2020), Chest pain, Congestive heart failure (formerly [facility]), Diabetes (formerly [facility]), Diabetic neuropathy (formerly [facility]), Hypercholesterolemia, Hypertension, Longstanding persistent atrial fibrillation (formerly [facility]) (11/8/2020), LV dysfunction (11/8/2020), Morbid obesity (formerly [facility]), NICM (nonischemic cardiomyopathy) (formerly [facility]) (11/8/2020), Noncompliance, Normal cardiac stress test, Orthostatic hypotension, and Sepsis (formerly [facility]) (11/8/2020).    Surgical History   has a past surgical history that includes toe amputation (Right, 11/10/2017); toe amputation (Right, 1/17/2018); irrigation & debridement ortho (Right, 2/19/2018); zzz cardiac cath (07/21/2018); and colectomy (N/A, 11/10/2020).    Family History  family history includes No Known Problems in his father and mother.    Social History   reports that he has never smoked. He has never used smokeless tobacco. He reports that he does not drink alcohol or use drugs.    Medications  Prior to Admission Medications   Prescriptions Last Dose Informant Patient Reported? Taking?   aspirin EC 81 MG EC tablet 11/3/2020 at 1000 Patient No No   Sig: Take 1 Tab by mouth every day.   cefdinir (OMNICEF) 300 MG Cap 11/3/2020 at 1000 Patient Yes Yes   Sig: Take 300 mg by mouth 2 times a day.   doxycycline monohydrate (ADOXA) 100 MG tablet 11/3/2020 at 1000 Patient Yes Yes   Sig: Take 100 mg by mouth  2 times a day.   furosemide (LASIX) 40 MG Tab 11/3/2020 at 1000 Patient No No   Sig: Take 1 Tab by mouth every day.   lisinopril (PRINIVIL) 10 MG Tab 11/3/2020 at 1000 Patient No No   Sig: Take 1 Tab by mouth every day.   metoprolol SR (TOPROL XL) 25 MG TABLET SR 24 HR 11/3/2020 at 1000 Patient No No   Sig: Take 2 Tabs by mouth every day.   metroNIDAZOLE (FLAGYL) 500 MG Tab 11/3/2020 at 1000 Patient Yes Yes   Sig: Take 500 mg by mouth 3 times a day.   olanzapine (ZYPREXA) 7.5 MG tablet 11/2/2020 at 2200 Patient Yes No   Sig: Take 7.5 mg by mouth every bedtime.   sertraline (ZOLOFT) 100 MG Tab 11/3/2020 at 1000 Patient Yes No   Sig: Take 100 mg by mouth every day. Take with sertraline 25 mg = 125 mg   sertraline (ZOLOFT) 25 MG tablet 11/3/2020 at 1000  Yes Yes   Sig: Take 25 mg by mouth every day. Take with sertraline 100 mg = 125 mg   spironolactone (ALDACTONE) 25 MG Tab  Patient No No   Sig: Take 1 Tab by mouth every day.      Facility-Administered Medications: None       Allergies  Allergies   Allergen Reactions   • Daptomycin Rash     Body rash  RXN=1/2018     • Pcn [Penicillins] Hives and Rash      Rash & hives  RXN=since a kid  Tolerates cephalosporins   • Unasyn [Ampicillin-Sulbactam Sodium] Hives, Itching and Swelling   • Vancomycin Rash     Red man syndrome       Physical Exam  Temp:  [35.9 °C (96.7 °F)-37 °C (98.6 °F)] 36.1 °C (96.9 °F)  Pulse:  [] 105  Resp:  [16-18] 18  BP: (127-162)/(75-89) 162/85  SpO2:  [90 %-97 %] 96 %    Physical Exam  Vitals signs and nursing note reviewed.   Constitutional:       General: He is not in acute distress.     Appearance: He is obese.   HENT:      Head: Normocephalic and atraumatic.      Nose: Nose normal. No congestion.      Mouth/Throat:      Mouth: Mucous membranes are moist.      Pharynx: Oropharynx is clear. No oropharyngeal exudate or posterior oropharyngeal erythema.   Eyes:      Extraocular Movements: Extraocular movements intact.      Conjunctiva/sclera:  Conjunctivae normal.      Pupils: Pupils are equal, round, and reactive to light.   Neck:      Musculoskeletal: Normal range of motion and neck supple.   Cardiovascular:      Rate and Rhythm: Normal rate. Rhythm irregular.      Pulses: Normal pulses.      Heart sounds: Normal heart sounds. No murmur. No friction rub. No gallop.    Pulmonary:      Effort: Pulmonary effort is normal. No respiratory distress.      Breath sounds: No stridor. No wheezing or rales.   Abdominal:      General: Abdomen is flat. There is no distension.      Palpations: Abdomen is soft.      Tenderness: There is abdominal tenderness.   Musculoskeletal:      Right lower leg: Edema (1+) present.      Left lower leg: Edema (1+) present.   Skin:     Capillary Refill: Capillary refill takes less than 2 seconds.      Comments: soft,obese,+ midline open incision with wound vac, + severe TTP, BETTE drain in place with serosanguinous fluid  Dressings are clean, dry, and intact with no surrounding erythema or edema  Bandage in place at left upper quadrant, clean, dry and intact, with no discharge or erythema noted  B/l Upper extremity ecchymosis  Venous stasis dermatitis at b/l LE   Neurological:      General: No focal deficit present.      Mental Status: He is alert.      Comments: Complains of yellow circles and squares in his visual fields especially surrounding lights   Psychiatric:         Mood and Affect: Mood normal.         Behavior: Behavior normal.         Fluids      Laboratory  Recent Labs     11/14/20  0420 11/15/20  0415 11/16/20  0535   WBC 9.9 9.1 13.1*   RBC 4.10* 4.13* 4.39*   HEMOGLOBIN 10.6* 10.6* 11.0*   HEMATOCRIT 33.8* 34.2* 35.7*   MCV 82.4 82.8 81.3*   MCH 25.9* 25.7* 25.1*   MCHC 31.4* 31.0* 30.8*   RDW 53.7* 54.1* 53.4*   PLATELETCT 291 288 318   MPV 10.0 9.9 10.0     Recent Labs     11/14/20  0420 11/15/20  0415 11/16/20  0535   SODIUM 134* 136 139   POTASSIUM 4.5 4.1 3.8   CHLORIDE 96 99 100   CO2 28 29 32   GLUCOSE 244* 226*  160*   BUN 41* 38* 31*   CREATININE 2.60* 2.10* 1.33   CALCIUM 8.1* 7.8* 8.0*                     Imaging  US-RENAL   Final Result    Examination was limited due to poor sound penetration related to body habitus.   Grossly normal appearance of the kidneys.      EC-ECHOCARDIOGRAM COMPLETE W/ CONT   Final Result      DX-CHEST-PORTABLE (1 VIEW)   Final Result      No pneumothorax identified following right subclavian line placement      Worsening atelectasis with consolidation at the bases not excluded      Stable cardiac silhouette enlargement      CT-ABDOMEN-PELVIS WITH   Final Result      Interval decrease in size of perisplenic abscess. Pigtail catheter now in place      Abscess has likely fistulous communication with the abutting splenic flexure of the colon. Colon is otherwise normal in appearance      Mild hepatosplenomegaly      Decreasing small left pleural effusion      Subacute anterior wedge compression fractures in the caudal thoracic spine      CT-DRAIN-PERITONEAL   Final Result      1.  CT guided perisplenic abscess catheter drainage.   2.  The current plan is to obtain a follow-up CT in 5-7 days..      OUTSIDE IMAGES-CT CHEST   Final Result      OUTSIDE IMAGES-CT ABDOMEN /PELVIS   Final Result      CT-ABDOMEN-PELVIS WITH   Final Result   Addendum 1 of 1   Addendum:   A prior noncontrast CT of the abdomen and pelvis from [facility] dated 10/19/2020 was made available for comparison. On the    prior study a perisplenic/subcapsular fluid collection was seen however    did not contain air at that time and    there was no discrete evidence of communication with the transverse colon.    Communication with splenic flexure of the colon and air within the    collection are new from the prior study.      Final      DX-CHEST-PORTABLE (1 VIEW)   Final Result      Slight interval interval improvement in congestive heart failure.          Assessment/Plan  SRUTHI (acute kidney injury)  (HCC)  Assessment & Plan  11/11 Hyperkalemia requiring insulin and kayexalate, Na Naco3 infusion   11/12 Continue volume expansion, avoid nephrotoxins, nephrology consult   11/13 Fluids changed to NS, no need for dialysis   11/17: SRUTHI resolved with Bun/creatinine of 31/1.33 and GFR of 55  Continue gentle IV hydration  Continue to monitor    Spontaneous perforation of colon (HCC)- (present on admission)  Assessment & Plan  Admitted with LUQ abscess and suspicion of perforation at splenic flexure  11/9 Drain with stool and unable to hold suction  11/10 Segmental colectomy, colostomy, Patti pouch, I & D of abdominal wall abscess  Kin Desouza DO General Surgery      LV dysfunction- (present on admission)  Assessment & Plan  BP low after abscess drainage  11/11 Echocardiogram completed with normal left ventricular chamber size.  Moderately reduced left ventricular systolic function.  Left ventricular ejection fraction is visually estimated to be 30-40%  Cardiology recs: to continue Toprol-XL 50 mg qd,  lisinopril 10 mg qd, spironolactone 25 qd    Paroxysmal A-fib with RVR (HCC)- (present on admission)  Assessment & Plan  Seen by cardiac electrophysiologist in 2019 and deemed not to be good candidate for anticoagulation due to frequent falls 2/2 orthostatic hypotension  CHADSVASC of 3  11/8 Cardiology consult  11/10 Amiodarone drip initiated   11/11 Added digoxin   11/12 Stopped amiodarone   11/14 Digoxin dose decreased , digoxin level 1.5  11/17 Repeat digoxin level > 5, awaiting 3 am digoxin levels, patient hemodynamically stable but with complaints of n/v, and visual hallucinations      Elevated digoxin level  Assessment & Plan  Stat EKG ordered  Digoxin > 5.0 --4 hours following digoxin dose at 1800 11/16  Patient transferred to telemetry  Awaiting 3am digoxin levels  Consider digifab  Pharmacy to dose, will consult following repeat labs  BP and HR WNL  CBC/CMP/Mg ordered  N/v and visual hallucinations  including yellow halos, circles and squares    Type 2 diabetes mellitus, without long-term current use of insulin, with peripheral neuropathy (HCC)- (present on admission)  Assessment & Plan  Patient states blood sugar well controlled at home with metformin. Last a1c of 7.8 on 11/10/20   POC glucose 100s - 140s.  - Sliding scale insulin with hypoglycemia protocol  - Ensure strict glucose control for proper infection control, <150 requested per ID

## 2020-11-17 NOTE — PROGRESS NOTES
Pharmacy Kinetics 55 y.o. male on vancomycin day # 1 11/17/2020    Currently Dose: Vancomycin 2000 mg iv q24hr (~11 mg/kg/dose)  Received Load Dose: Yes    Indication for Treatment: IAI/abscess  Provider Specified End Date: No  ID Service Following: Yes    Pertinent history per medical record: Admitted on 11/3/2020 for concern for abdominal pain after procedure found to have colon perforation. ID and surgery consulted.    Other antibiotics: ceftriaxone 2 gm iv q24h, metronidazole 500 mg po q8h, fluconazole 400 mg po q24h    Allergies: Daptomycin, Pcn [penicillins], Unasyn [ampicillin-sulbactam sodium], and Vancomycin     List concerns for accumulation of vancomycin: age 55, BMI ~54, CHF    Pertinent cultures to date:   Results     Procedure Component Value Units Date/Time    URINALYSIS [[id number]]     Order Status: No result Specimen: Urine, Clean Catch     CULTURE WOUND W/ GRAM STAIN [[id number]]  (Abnormal)  (Susceptibility) Collected: 11/10/20 1039    Order Status: Completed Specimen: Wound Updated: 11/16/20 1152     Significant Indicator POS     Source WND     Site COLON PERFORATION     Culture Result -     Gram Stain Result Rare WBCs.  Rare Gram positive cocci.  Rare Gram positive rods.       Culture Result Enterococcus faecalis  Light growth        Candida albicans  Light growth      Susceptibility     Enterococcus faecalis (1)     Antibiotic Interpretation Microscan Method Status    Daptomycin Sensitive 4 mcg/mL ISAMAR Final    Ampicillin Sensitive <=2 mcg/mL ISAMAR Final    Gent Synergy Sensitive <=500 mcg/mL ISAMAR Final    Vancomycin Sensitive 1 mcg/mL ISAMAR Final    Penicillin Sensitive 8 mcg/mL ISAMAR Final                   Anaerobic Culture [[id number]] Collected: 11/10/20 1039    Order Status: Completed Specimen: Wound Updated: 11/16/20 1152     Significant Indicator NEG     Source WND     Site COLON PERFORATION     Culture Result No Anaerobes isolated.    BLOOD CULTURE [[id number]] Collected: 11/05/20 1950    Order  "Status: Completed Specimen: Blood from Peripheral Updated: 11/10/20 2100     Significant Indicator NEG     Source BLD     Site PERIPHERAL     Culture Result No growth after 5 days of incubation.    Narrative:      Per Hospital Policy: Only change Specimen Src: to \"Line\" if  specified by physician order.  Right AC    BLOOD CULTURE [[id number]] Collected: 20    Order Status: Completed Specimen: Blood from Peripheral Updated: 11/10/20 2100     Significant Indicator NEG     Source BLD     Site PERIPHERAL     Culture Result No growth after 5 days of incubation.    Narrative:      Per Hospital Policy: Only change Specimen Src: to \"Line\" if  specified by physician order.  Right Hand    GRAM STAIN [[id number]] Collected: 11/10/20 103    Order Status: Completed Specimen: Wound Updated: 11/10/20 1739     Significant Indicator .     Source WND     Site COLON PERFORATION     Gram Stain Result Rare WBCs.  Rare Gram positive cocci.  Rare Gram positive rods.          MRSA nares swab if pneumonia is a concern (ordered/positive/negative/n-a): n/a    Recent Labs     11/15/20  0415 11/16/20  0535 20  0444   WBC 9.1 13.1* 19.1*   NEUTSPOLYS 76.80* 67.60 73.10*     Recent Labs     11/15/20  0415 11/16/20  0535 20  0444   BUN 38* 31* 24*   CREATININE 2.10* 1.33 1.01   ALBUMIN 2.7* 2.7* 2.5*     Intake/Output Summary (Last 24 hours) at 2020 0941  Last data filed at 2020 0818  Gross per 24 hour   Intake 480 ml   Output 1935 ml   Net -1455 ml      BP (!) 161/82   Pulse 99   Temp 36 °C (96.8 °F) (Temporal)   Resp 18   Ht 1.829 m (6')   Wt (!) 179 kg (394 lb 10 oz)   SpO2 96%  Temp (24hrs), Av.3 °C (97.3 °F), Min:35.9 °C (96.7 °F), Max:37 °C (98.6 °F)    Estimated Creatinine Clearance: 134.9 mL/min (by C-G formula based on SCr of 1.01 mg/dL).    A/P   1. Vancomycin dose change: not indicated   2. Next vancomycin level: 20 @0530 (ordered)  3. Goal trough: 10-15 mcg/mL  4. Comments: " "Hypertensive. Afebrile. WBC elevated/increased. SCr improved. CrCl ~135 mL/min and likely inaccurate due to body habitus. Microbiology pending. Vancomycin level in place prior to AM dose 11/19/20 to assess clearance. CBC and CMP with AM labs per provider. Chart review notes CVC in place. 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 Pharmacy will continue to follow.    Eric Peacock, PharmD  "

## 2020-11-17 NOTE — PROGRESS NOTES
Infectious Disease Progress Note    Author: Dianne Fang M.D. Date & Time of service: 2020  9:00 AM       Chief Complaint:  Intrabdominal abscess and bacteremia     Interval History:    AF WBC 9.4 Less pain in side but remains tender to touch. Denies SE Zyvox-counseled about sxs serotonin syndrome   T-max 99.7 WBC 15.4 patient underwent colectomy, colostomy placement and I&D of flank wound with wound VAC placement on 11/10/2020 by Dr. Desouza.  Postop course complicated by hypotension and transferred to the ICU.  On pressors.  Patient complaining of shortness of breath as well as lightheadedness.  Also has postop abdominal pain.   afebrile WBC 13.3.  Patient weaned off pressors overnight.  Operative cultures growing group D Enterococcus and yeast.  Patient feeling better today.  Improved shortness of breath.  Abdominal pain stable   afebrile WBC 13.8 patient continues to feel better.  He is inquiring when he can have food by mouth.   afebrile WBC 9.9.  Patient feeling a bit better today.  His diet has been slowly advanced.  He is however complaining of some sharp abdominal pain when he coughs    AF, O2 RA, abdominal pain and poor appetite.     Review of Systems:  Review of Systems   Constitutional: Positive for malaise/fatigue. Negative for chills and fever.   Gastrointestinal: Positive for abdominal pain and nausea. Negative for constipation, diarrhea and vomiting.   Musculoskeletal: Negative for myalgias.       Hemodynamics:  Temp (24hrs), Av.3 °C (97.3 °F), Min:35.9 °C (96.7 °F), Max:37 °C (98.6 °F)  Temperature: 36 °C (96.8 °F)  Pulse  Av.2  Min: 51  Max: 161   Blood Pressure: (!) 161/82(rn notified)      Physical Exam:  Physical Exam  Constitutional:       Appearance: He is obese. He is ill-appearing.   Cardiovascular:      Rate and Rhythm: Normal rate and regular rhythm.      Heart sounds: Normal heart sounds.   Pulmonary:      Effort: Pulmonary effort is  normal.      Breath sounds: Normal breath sounds.   Abdominal:      General: Abdomen is flat.      Palpations: Abdomen is soft.      Tenderness: There is no abdominal tenderness.      Comments: Drain in place with purulent appearing fluid, significant tenderness to palpation particularly on left side, ostomy   Musculoskeletal: Normal range of motion.      Right lower leg: Edema present.      Left lower leg: Edema present.   Skin:     General: Skin is warm and dry.   Neurological:      General: No focal deficit present.      Mental Status: He is alert and oriented to person, place, and time.   Psychiatric:         Mood and Affect: Mood normal.         Behavior: Behavior normal.         Meds:    Current Facility-Administered Medications:   •  magnesium sulfate  •  fentaNYL  •  fluconazole  •  insulin glargine  •  heparin  •  insulin regular **AND** POC Blood Glucose **AND** NOTIFY MD and PharmD **AND** glucose **AND** dextrose 50%  •  albuterol  •  ondansetron  •  linezolid  •  ondansetron  •  OLANZapine  •  sertraline    Labs:  Recent Labs     11/15/20  0415 11/16/20  0535 11/17/20  0444   WBC 9.1 13.1* 19.1*   RBC 4.13* 4.39* 4.51*   HEMOGLOBIN 10.6* 11.0* 11.9*   HEMATOCRIT 34.2* 35.7* 38.0*   MCV 82.8 81.3* 84.3   MCH 25.7* 25.1* 26.4*   RDW 54.1* 53.4* 54.7*   PLATELETCT 288 318 253   MPV 9.9 10.0 9.6   NEUTSPOLYS 76.80* 67.60 73.10*   LYMPHOCYTES 13.50* 18.90* 13.60*   MONOCYTES 7.10 8.70 6.60   EOSINOPHILS 0.00 1.60 4.70   BASOPHILS 0.20 0.40 0.20     Recent Labs     11/15/20  0415 11/16/20  0535 11/17/20  0444   SODIUM 136 139 138   POTASSIUM 4.1 3.8 3.4*   CHLORIDE 99 100 99   CO2 29 32 35*   GLUCOSE 226* 160* 120*   BUN 38* 31* 24*     Recent Labs     11/15/20  0415 11/16/20  0535 11/17/20  0444   ALBUMIN 2.7* 2.7* 2.5*   TBILIRUBIN 0.5 0.6 0.8   ALKPHOSPHAT 70 75 65   TOTPROTEIN 5.4* 5.4* 4.7*   ALTSGPT 8 9 9   ASTSGOT 22 23 16   CREATININE 2.10* 1.33 1.01       Imaging:  Ct-abdomen-pelvis With    Result  Date: 11/9/2020 11/8/2020 11:34 PM HISTORY/REASON FOR EXAM:  Abdominal infection suspected; Has perisplenic abscess with possible connection to bowel TECHNIQUE/EXAM DESCRIPTION: CT of the abdomen and pelvis with contrast. Contrast-enhanced helical scanning was obtained from the diaphragmatic domes through the pubic symphysis following the bolus administration of 100 mL of nonionic contrast (Omnipaque 350) without complication. Low dose optimization technique was utilized for this CT exam including automated exposure control and adjustment of the mA and/or kV according to patient size. COMPARISON: 11/3/2020 CT FINDINGS: The visualized lung bases show diminished small left pleural fluid that layers dependently. There is similar middle and left lower lobe atelectasis . Similar multichamber cardiac enlargement CT Abdomen: There is a new pigtail catheter in the anterior perisplenic fluid collection. The collection does diminish in size now measuring 58 x 33 from 79 x 40 mm. There is still gas within it and it is in direct contact with the splenic flexure of the colon. A small amount of gas is also seen in the more superficial soft tissues inferior to the pigtail catheter which crosses the ninth intercostal space No new abscess is detected Contrast is present in the small bowel but not yet in the transverse colon. No contrast extravasation is seen. No abnormal bowel dilatation The pancreas, adrenal glands and kidneys enhance normally. Mild hepatosplenomegaly Some low-density in the gallbladder likely indicating cholelithiasis There is no lymphadenopathy or aneurysmal change of the aorta. Compression deformities in the spine are again seen with subacute fractures resulting in anterior wedging at T11 and T12 There is diffuse fat stranding indicating anasarca CT Pelvis: Visualized pelvic structures are unremarkable. Prostate is within normal limits for age. No lymphadenopathy. There is no free air or free fluid.          Interval decrease in size of perisplenic abscess. Pigtail catheter now in place Abscess has likely fistulous communication with the abutting splenic flexure of the colon. Colon is otherwise normal in appearance Mild hepatosplenomegaly Decreasing small left pleural effusion Subacute anterior wedge compression fractures in the caudal thoracic spine    Ct-abdomen-pelvis With    Addendum Date: 11/5/2020    Addendum: A prior noncontrast CT of the abdomen and pelvis from [facility] dated 10/19/2020 was made available for comparison. On the prior study a perisplenic/subcapsular fluid collection was seen however did not contain air at that time and there was no discrete evidence of communication with the transverse colon. Communication with splenic flexure of the colon and air within the collection are new from the prior study.    Result Date: 11/5/2020  11/3/2020 4:02 PM HISTORY/REASON FOR EXAM:  Abd pain, unspecified; IV contrast only. TECHNIQUE/EXAM DESCRIPTION:   CT scan of the abdomen and pelvis with contrast. Contrast-enhanced helical scanning was obtained from the diaphragmatic domes through the pubic symphysis following the bolus administration of nonionic contrast without complication. 100 mL of Omnipaque 350 nonionic contrast was administered without complication. Low dose optimization technique was utilized for this CT exam including automated exposure control and adjustment of the mA and/or kV according to patient size. COMPARISON: 9/26/2018 FINDINGS: Chest Base: Small left pleural effusion with mild bibasilar atelectasis. Heart is mildly enlarged. Liver:  Diffuse hypoattenuation of the liver suggesting fatty infiltration. Moderately enlarged. Gallbladder: Cholelithiasis. Biliary tract: Nondilated. Pancreas: Normal. Spleen: There is a rim-enhancing perisplenic fluid collection which contains air and which communicates with the splenic flexure of the colon. This most likely represents an  abscess and measures about 8.7 x 7.9 cm. The anterior aspect of the spleen is nonenhancing with irregular margins could be related to infection of splenic parenchyma or infarction. Adrenals: Normal. Kidneys and Collecting Systems:  There is mild right renal cortical scarring. No hydronephrosis. No renal mass. Gastrointestinal tract:  No bowel obstruction. The appendix is normal. Peritoneum: No free air or free fluid. Reproductive organs:  Normal. Bladder:  Normal. Vessels:  Normal caliber. Lymph  Nodes:  No lymphadenopathy. Abdominal wall: Within normal limits. Bones:  Age indeterminate possibly subacute to chronic T11 and T12 superior endplate compression fractures.     1.  Peripherally enhancing perisplenic fluid collection which communicates with the splenic flexure of the colon and which contains foci of air is most consistent with an abscess/infected fluid collection. There is nonenhancement of the anterior aspect of the spleen suggesting infection/infarction. Surgical consultation is recommended. 2.  Hepatomegaly and hepatic steatosis. 3.  Cholelithiasis. 4.  Right renal cortical scarring. No hydronephrosis. 5.  Small left pleural effusion. Mild bibasilar atelectasis. 6.  Subacute to chronic appearing T11 and T12 superior endplate compression fractures. These findings were discussed with SVETLANA PAREDES on 11/3/2020 4:49 PM.     Ct-drain-peritoneal    Result Date: 11/4/2020 11/4/2020 5:19 PM HISTORY/REASON FOR EXAM:  Joya Splenic Fluid Collection TECHNIQUE/EXAM DESCRIPTION: LUQ parasplenic abscess drainage with CT guidance. Low dose optimization technique was utilized for this CT exam including automated exposure control and adjustment of the mA and/or kV according to patient size. PROCEDURE: Informed consent was obtained. SEDATION: Moderate sedation was provided. Pulse oximetry and continuous cardiac monitoring by the nurse was performed throughout the exam. Intraservice time was 30 minutes. Localizing CT  images were obtained with the patient in supine position. The skin was prepped with Betadine and draped in a sterile fashion. Following local anesthesia with 1% Lidocaine, a 17 G guiding needle was placed and needle path confirmed with CT. An Amplatz guidewire was placed and following serial tract dilatation, a 10 Italian pigtail locking catheter was placed. A specimen was collected and submitted for culture and sensitivity and Gram stain. Total of 6 mL of Brownish fluid was drained. The catheter was secured to the skin and connected to suction bulb drainage. Final CT images were obtained documenting catheter position. The patient tolerated the procedure well with no evidence of complication. COMPARISON: CT scan abdomen and pelvis 11/3/2020 FINDINGS: The final CT images show satisfactory catheter position within the target collection.     1.  CT guided perisplenic abscess catheter drainage. 2.  The current plan is to obtain a follow-up CT in 5-7 days..    Dx-chest-portable (1 View)    Result Date: 11/10/2020  11/10/2020 12:29 PM HISTORY/REASON FOR EXAM: new central line. TECHNIQUE/EXAM DESCRIPTION AND NUMBER OF VIEWS: Single AP view of the chest. COMPARISON: November 3 FINDINGS: Hardware: ] Tip overlies the cavoatrial junction Lungs: Linear middle lobe and left basilar opacity is seen. Volumes are low Pleura:  No pneumothorax is identified Heart and mediastinum: There is stable cardiac silhouette enlargement.     No pneumothorax identified following right subclavian line placement Worsening atelectasis with consolidation at the bases not excluded Stable cardiac silhouette enlargement    Dx-chest-portable (1 View)    Result Date: 11/3/2020  11/3/2020 3:41 PM HISTORY/REASON FOR EXAM:  Chest Pain. TECHNIQUE/EXAM DESCRIPTION AND NUMBER OF VIEWS: Single portable view of the chest. COMPARISON: September 10, 2020 FINDINGS: The cardiac size is enlarged. There is interstitial prominence. Lung aeration has improved. No  pneumothorax. No obvious pleural effusion.     Slight interval interval improvement in congestive heart failure.    Us-renal    Result Date: 2020 6:21 PM HISTORY/REASON FOR EXAM:  Abnormal Labs TECHNIQUE/EXAM DESCRIPTION: Renal ultrasound. COMPARISON:  None FINDINGS: The right kidney measures 9.39 cm. The left kidney measures 9.54 cm. There is no hydronephrosis. There are no abnormal calcifications. The bladder not fully the time of imaging.      Examination was limited due to poor sound penetration related to body habitus. Grossly normal appearance of the kidneys.    Ec-echocardiogram Complete W/ Cont    Result Date: 2020  Transthoracic Echo Report Echocardiography Laboratory CONCLUSIONS Normal left ventricular chamber size. Moderately reduced left ventricular systolic function. Left ventricular ejection fraction is visually estimated to be 30-40%; variable related to atrial fibrillation. Mild mitral regurgitation. Moderately dilated right ventricle. Reduced right ventricular systolic function. Right heart pressures are normal. No prior study is available for comparison. EMELY FLORES Exam Date:         2020                    11:34 Exam Location:     Inpatient Priority:          Routine Ordering Physician:        SUZI EWING Referring Physician: Sonographer:               Sherry Funes [facility] Age:    55     Gender:    M MRN:    [id number] :    1965 BSA:    2.7    Ht (in):    72     Wt (lb):    350 Exam Type:     Complete, Contrast Indications:     Congestive Heart Failure ICD Codes:       428 CPT Codes:       81662,  BP:   136    /   86     HR:   100 Technical Quality:       Technically difficult study -                          adequate information is obtained MEASUREMENTS  (Male / Female) Normal Values 2D ECHO LV Diastolic Diameter PLAX        4.6 cm                4.2 - 5.9 / 3.9 - 5.3 cm LV Systolic Diameter PLAX         4.1 cm                2.1 - 4.0 cm IVS  Diastolic Thickness           1.1 cm                LVPW Diastolic Thickness          0.85 cm               LVOT Diameter                     2.5 cm                LV Ejection Fraction MOD 4C       34.7 %                IVC Diameter                      0.95 cm               DOPPLER AV Peak Velocity                  1.3 m/s               AV Peak Gradient                  6.3 mmHg              AV Mean Gradient                  3.9 mmHg              LVOT Peak Velocity                1 m/s                 AV Area Cont Eq vti               3.7 cm2               TR Peak Velocity                  232 cm/s              * Indicates values subject to auto-interpretation LV EF:        % FINDINGS Left Ventricle 3 mL of contrast was administered. Contrast was used to enhance visualization of the endocardial border. Existing IV was used. Mild concentric left ventricular hypertrophy. Normal left ventricular chamber size.  Moderately reduced left ventricular systolic function. Left ventricular ejection fraction is visually estimated to be 30-40%. Diastolic function is difficult to assess with atrial fibrillation. Right Ventricle Moderately dilated right ventricle. Reduced right ventricular systolic function. Right Atrium Enlarged right atrium. Normal inferior vena cava size and inspiratory collapse. Left Atrium Normal left atrial size. Mitral Valve Structurally normal mitral valve. Mild mitral regurgitation. No mitral stenosis. Aortic Valve Tricuspid aortic valve. Structurally normal aortic valve. No stenosis or regurgitation seen. Tricuspid Valve Structurally normal tricuspid valve. Mild tricuspid regurgitation. Right atrial pressure is estimated to be 3 mmHg. Estimated right ventricular systolic pressure  is 25 mmHg. No tricuspid stenosis. Right heart pressures are normal. Pulmonic Valve Structurally normal pulmonic valve without significant stenosis or regurgitation. Pericardium Normal pericardium without effusion. Aorta The  "aortic root is normal.  Ascending aorta diameter is 3.6 cm. Germain Pacheco M.D. (Electronically Signed) Final Date:     11 November 2020                 12:38 Amended:        11 November 2020 12:40      Micro:  Results     Procedure Component Value Units Date/Time    URINALYSIS [[id number]]     Order Status: No result Specimen: Urine, Clean Catch     CULTURE WOUND W/ GRAM STAIN [[id number]]  (Abnormal)  (Susceptibility) Collected: 11/10/20 1039    Order Status: Completed Specimen: Wound Updated: 11/16/20 1152     Significant Indicator POS     Source WND     Site COLON PERFORATION     Culture Result -     Gram Stain Result Rare WBCs.  Rare Gram positive cocci.  Rare Gram positive rods.       Culture Result Enterococcus faecalis  Light growth        Candida albicans  Light growth      Susceptibility     Enterococcus faecalis (1)     Antibiotic Interpretation Microscan Method Status    Daptomycin Sensitive 4 mcg/mL ISAMAR Final    Ampicillin Sensitive <=2 mcg/mL ISAMAR Final    Gent Synergy Sensitive <=500 mcg/mL ISAMAR Final    Vancomycin Sensitive 1 mcg/mL ISAMAR Final    Penicillin Sensitive 8 mcg/mL ISAMAR Final                   Anaerobic Culture [[id number]] Collected: 11/10/20 1039    Order Status: Completed Specimen: Wound Updated: 11/16/20 1152     Significant Indicator NEG     Source WND     Site COLON PERFORATION     Culture Result No Anaerobes isolated.    BLOOD CULTURE [[id number]] Collected: 11/05/20 1950    Order Status: Completed Specimen: Blood from Peripheral Updated: 11/10/20 2100     Significant Indicator NEG     Source BLD     Site PERIPHERAL     Culture Result No growth after 5 days of incubation.    Narrative:      Per Hospital Policy: Only change Specimen Src: to \"Line\" if  specified by physician order.  Right AC    BLOOD CULTURE [[id number]] Collected: 11/05/20 1950    Order Status: Completed Specimen: Blood from Peripheral Updated: 11/10/20 2100     Significant Indicator NEG     Source BLD     Site " "PERIPHERAL     Culture Result No growth after 5 days of incubation.    Narrative:      Per Hospital Policy: Only change Specimen Src: to \"Line\" if  specified by physician order.  Right Hand    GRAM STAIN [[id number]] Collected: 11/10/20 1039    Order Status: Completed Specimen: Wound Updated: 11/10/20 1739     Significant Indicator .     Source WND     Site COLON PERFORATION     Gram Stain Result Rare WBCs.  Rare Gram positive cocci.  Rare Gram positive rods.            Assessment:  Active Hospital Problems    Diagnosis   • SRUTHI (acute kidney injury) ([facility]) [N17.9]   • Spontaneous perforation of colon ([facility]) [K63.1]   • Adrenal insufficiency ([facility]) [E27.40]   • Sepsis ([facility]) [A41.9]   • LV dysfunction [I51.9]   • Paroxysmal A-fib with RVR ([facility]) [I48.0]   • Type 2 diabetes mellitus, without long-term current use of insulin, with peripheral neuropathy ([facility]) [E11.9]   • Elevated digoxin level [R78.89]     Interval 24 hours:      AF, O2 1 L NC   Labs reviewed, WBC 19.1  Imaging personally reviewed both images and report.  Bilateral interstitial opacities again seen, appear to be slightly worse.   Studies reviewed  Micro reviewed  Drain output 465 cc yesterday    Patient complaining of ongoing nausea, poor appetite and abdomen is quite tender on palpation.  Still significant drain output.  White count elevated and surgery has ordered CT abdomen pelvis which is pending.  Will broaden antibiotics as below.    Assessment:  55 y.o. diabetic male admitted 11/3/2020.+ A. fib, CHD, and severe morbid obesity.  Seen by ID in the past for right foot abscess with cultures positive for MRSA and Proteus.  s/p GLF approximately 1 month ago and was admitted to [facility] with left upper quadrant pain.  CT at the time revealed a subcapsular splenic hematoma. Presented to ED complaining of abdominal pain.    Hospital Course:   CT scan on 11/3 revealed a peripherally enhancing perisplenic fluid collection measuring 8.7 x 7.9 " cm, communicating with the splenic flexure of the colon and contained foci of air consistent with abscess/infected fluid.  Also noted nonenhancing of the anterior aspect of the spleen suggesting infection/infarction.    Status post surgery on 11/10/2020     Postop hypotension, resolved  Leukocytosis - new, worse today     Acute kidney injury- improved   Renal ultrasound-unremarkable  Nephrology following     Bacteremia  -Blood cultures on 11/31/2 + viridans streptococci, possible contaminant  -Blood culture on 11/5 with no growth to date     Perisplenic abscess with possible communication to the bowel  Left upper quadrant abscess  Afebrile  Splenic enhancement on CT, suggesting infection or infarction  -Drainage on 11/4 and peritoneal fluid Enterococcus faecalis, ampicillin sensitive and C albicans  Recent ground-level fall located by a subcapsular splenic hematoma   Status post segmental colectomy, colostomy, irrigation and debridement of flank wound and wound VAC placement on 11/10/2020 by Dr. Desouza.  Patient found to have a left upper quadrant abscess inferior to the spleen with a large bowel wall defect.  Also noted to have an abdominal wall abscess.  Operative cultures (colon perforation) -ampicillin sensitive Enterococcus faecalis and Candida albicans.  Gram stain+ GPC, GPR     Type II diabetes mellitus  Keep BS under 150 to help control current infection     A. Fib  Contributing to hypotension  Cards following      Diarrhea  -C. difficile tested negative on 11/4     Antibiotic allergies: Daptomycin rash, penicillins rash and hives, tolerates cephalosporins-discussed allergies and he states that he has developed significant rash and hives with penicillins several times, most recently a year and a half ago- will avoid penicillins    Nausea and poor appetite-potentially related to linezolid      Plan:     --- Increase in WBC and still with ongoing abdominal pain and significant drainage, surgery has ordered  chest x-ray and CT of the pelvis without contrast-pending  ---  Stop linezolid and transition to vancomycin to treat the Enterococcus-patient fears of had red man syndrome with previous vancomycin therapy so ensure slow infusion, start ceftriaxone to provide gram-negative coverage and will start Flagyl for anaerobic coverage  --- Continue fluconazole 400 mg daily   --- Monitor CBC   --- Avoid nephrotoxins and renally dose medication    Discussed with internal medicine, Dr. Myrick.  ID will follow.

## 2020-11-17 NOTE — PROGRESS NOTES
General Surgery Progress Note  [facility]      CHIEF COMPLAINT: Abdominal pain.     HISTORY OF PRESENT ILLNESS: The patient is a 55 y.o. male, who presents with abdominal pain status post Interiano's procedure for perforation of the splenic flexure.     INTERVAL UPDATE: Moved to telemetry last night due to digoxin toxicity.  Appreciate medicine support.  Elevated white blood cell count today to 19,000.    PAST MEDICAL HISTORY:  has a past medical history of A-fib ([facility]), Abscess (11/8/2020), Bacteremia (11/8/2020), Chest pain, Congestive heart failure ([facility]), Diabetes ([facility]), Diabetic neuropathy ([facility]), Hypercholesterolemia, Hypertension, Longstanding persistent atrial fibrillation ([facility]) (11/8/2020), LV dysfunction (11/8/2020), Morbid obesity ([facility]ly [facility]), NICM (nonischemic cardiomyopathy) ([facility]ly [facility]) (11/8/2020), Noncompliance, Normal cardiac stress test, Orthostatic hypotension, and Sepsis ([facility]ly [facility]) (11/8/2020).     PAST SURGICAL HISTORY:  has a past surgical history that includes toe amputation (Right, 11/10/2017); toe amputation (Right, 1/17/2018); irrigation & debridement ortho (Right, 2/19/2018); zzz cardiac cath (07/21/2018); and colectomy (N/A, 11/10/2020).     ALLERGIES:   Allergies   Allergen Reactions   • Daptomycin Rash     Body rash  RXN=1/2018     • Pcn [Penicillins] Hives and Rash      Rash & hives  RXN=since a kid  Tolerates cephalosporins   • Unasyn [Ampicillin-Sulbactam Sodium] Hives, Itching and Swelling   • Vancomycin Rash     Red man syndrome        CURRENT MEDICATIONS:   Home Medications     Reviewed by James Perez on 11/03/20 at 1859  Med List Status: Complete   Medication Last Dose Status   aspirin EC 81 MG EC tablet 11/3/2020 Active   cefdinir (OMNICEF) 300 MG Cap 11/3/2020 Active   doxycycline monohydrate (ADOXA) 100 MG tablet 11/3/2020 Active   furosemide (LASIX) 40 MG Tab 11/3/2020 Active   lisinopril (PRINIVIL) 10 MG Tab 11/3/2020 Active   metoprolol SR (TOPROL XL) 25 MG TABLET SR 24  HR 11/3/2020 Active   metroNIDAZOLE (FLAGYL) 500 MG Tab 11/3/2020 Active   olanzapine (ZYPREXA) 7.5 MG tablet 11/2/2020 Active   sertraline (ZOLOFT) 100 MG Tab 11/3/2020 Active   sertraline (ZOLOFT) 25 MG tablet 11/3/2020 Active   spironolactone (ALDACTONE) 25 MG Tab  Active                FAMILY HISTORY:   Family History   Problem Relation Age of Onset   • No Known Problems Mother    • No Known Problems Father         SOCIAL HISTORY:   Social History     Tobacco Use   • Smoking status: Never Smoker   • Smokeless tobacco: Never Used   Substance and Sexual Activity   • Alcohol use: No   • Drug use: No   • Sexual activity: Not on file       REVIEW OF SYSTEMS: Comprehensive review of systems was negative    PHYSICAL EXAMINATION:     GENERAL: The patient is in no acute distress.   VITAL SIGNS: BP (!) 161/82   Pulse 99   Temp 36 °C (96.8 °F) (Temporal)   Resp 18   Ht 1.829 m (6')   Wt (!) 179 kg (394 lb 10 oz)   SpO2 96%   HEAD AND NECK: Demonstrates symmetric, reactive pupils. Extraocular muscles   are intact. Nares and oropharynx are clear.   NECK: Supple. No adenopathy.  CHEST:No respiratory distress.    CARDIOVASCULAR: Regular rate. The extremities are well perfused.   ABDOMEN: Obese abdomen.  Midline wound VAC in place.  Ostomy functioning.  BETTE serous.   EXTREMITIES: Extremely dry feet.  NEUROLOGIC: Alert & oriented x 3, Normal motor function, Normal sensory function, No focal deficits noted    Drain output:   Output by Drain (mL) 11/15/20 0700 - 11/15/20 1859 11/15/20 1900 - 11/16/20 0659 11/16/20 0700 - 11/16/20 1859 11/16/20 1900 - 11/17/20 0659 11/17/20 0700 - 11/17/20 0843   Patient has no LDAs of requested type attached.        LABORATORY VALUES:   Recent Labs     11/15/20  0415 11/16/20  0535 11/17/20  0444   WBC 9.1 13.1* 19.1*   RBC 4.13* 4.39* 4.51*   HEMOGLOBIN 10.6* 11.0* 11.9*   HEMATOCRIT 34.2* 35.7* 38.0*   MCV 82.8 81.3* 84.3   MCH 25.7* 25.1* 26.4*   MCHC 31.0* 30.8* 31.3*   RDW 54.1* 53.4*  54.7*   PLATELETCT 288 318 253   MPV 9.9 10.0 9.6     Recent Labs     11/15/20  0415 11/16/20  0535 11/17/20  0444   SODIUM 136 139 138   POTASSIUM 4.1 3.8 3.4*   CHLORIDE 99 100 99   CO2 29 32 35*   GLUCOSE 226* 160* 120*   BUN 38* 31* 24*   CREATININE 2.10* 1.33 1.01   CALCIUM 7.8* 8.0* 7.4*     Recent Labs     11/15/20  0415 11/16/20  0535 11/17/20  0444   ASTSGOT 22 23 16   ALTSGPT 8 9 9   TBILIRUBIN 0.5 0.6 0.8   ALKPHOSPHAT 70 75 65   GLOBULIN 2.7 2.7 2.2            IMAGING:   US-RENAL   Final Result    Examination was limited due to poor sound penetration related to body habitus.   Grossly normal appearance of the kidneys.      EC-ECHOCARDIOGRAM COMPLETE W/ CONT   Final Result      DX-CHEST-PORTABLE (1 VIEW)   Final Result      No pneumothorax identified following right subclavian line placement      Worsening atelectasis with consolidation at the bases not excluded      Stable cardiac silhouette enlargement      CT-ABDOMEN-PELVIS WITH   Final Result      Interval decrease in size of perisplenic abscess. Pigtail catheter now in place      Abscess has likely fistulous communication with the abutting splenic flexure of the colon. Colon is otherwise normal in appearance      Mild hepatosplenomegaly      Decreasing small left pleural effusion      Subacute anterior wedge compression fractures in the caudal thoracic spine      CT-DRAIN-PERITONEAL   Final Result      1.  CT guided perisplenic abscess catheter drainage.   2.  The current plan is to obtain a follow-up CT in 5-7 days..      OUTSIDE IMAGES-CT CHEST   Final Result      OUTSIDE IMAGES-CT ABDOMEN /PELVIS   Final Result      CT-ABDOMEN-PELVIS WITH   Final Result   Addendum 1 of 1   Addendum:   A prior noncontrast CT of the abdomen and pelvis from [facility] dated 10/19/2020 was made available for comparison. On the    prior study a perisplenic/subcapsular fluid collection was seen however    did not contain air at that time and     there was no discrete evidence of communication with the transverse colon.    Communication with splenic flexure of the colon and air within the    collection are new from the prior study.      Final      DX-CHEST-PORTABLE (1 VIEW)   Final Result      Slight interval interval improvement in congestive heart failure.      DX-CHEST-PORTABLE (1 VIEW)    (Results Pending)   CT-ABDOMEN-PELVIS W/O    (Results Pending)       Problem List:   Patient Active Problem List    Diagnosis Date Noted   • SRUTHI (acute kidney injury) ([facility]) 11/11/2020     Priority: High   • Spontaneous perforation of colon ([facility]) 11/10/2020     Priority: High   • Orthostatic hypotension 11/27/2018     Priority: High   • Acute on chronic diastolic heart failure ([facility]) 11/21/2018     Priority: High   • Fall 07/10/2018     Priority: High   • Atypical chest pain 07/09/2018     Priority: High   • Diabetic foot ([facility]) 02/17/2018     Priority: High   • Diabetic infection of right foot ([facility]) 02/02/2018     Priority: High   • Lower limb amputation, great toe ([facility]) 01/15/2018     Priority: High   • Gangrene of toe of right foot, s/p recent amputation ([facility]) 11/11/2017     Priority: High   • Adrenal insufficiency ([facility]) 11/10/2020     Priority: Medium   • Sepsis ([facility]) 11/08/2020     Priority: Medium   • LV dysfunction 11/08/2020     Priority: Medium   • Fall from ground level 01/09/2019     Priority: Medium   • Lumbar compression fracture ([facility]) 12/29/2018     Priority: Medium   • Psychogenic nonepileptic seizure 11/24/2018     Priority: Medium   • Bilateral leg edema 11/20/2018     Priority: Medium   • Venous stasis dermatitis of both lower extremities 11/20/2018     Priority: Medium   • Urinary retention 09/27/2018     Priority: Medium   • Other chest pain 09/10/2018     Priority: Medium   • Difficulty voiding 09/10/2018     Priority: Medium   • Vitamin B12 deficiency 07/24/2018     Priority: Medium   • Paroxysmal A-fib with RVR ([facility]) 07/09/2018     Priority:  Medium   • Left knee pain 11/23/2017     Priority: Medium   • Class 2 obesity due to excess calories without serious comorbidity with body mass index (BMI) of 37.0 to 37.9 in adult 10/22/2018     Priority: Low   • Acquired circulating anticoagulants ([facility]) 07/09/2018     Priority: Low   • Essential hypertension 02/08/2018     Priority: Low   • GERD (gastroesophageal reflux disease) 01/15/2018     Priority: Low   • Mixed hyperlipidemia 01/15/2018     Priority: Low   • Type 2 diabetes mellitus, without long-term current use of insulin, with peripheral neuropathy ([facility]) 11/22/2017     Priority: Low   • Candidal intertrigo 11/11/2017     Priority: Low   • Obesity 11/11/2017     Priority: Low   • Elevated digoxin level 11/17/2020   • NICM (nonischemic cardiomyopathy) ([facility]) 11/08/2020   • Debility 10/26/2019   • Polypharmacy 10/20/2019   • Pre-diabetes 10/19/2019   • Chronic systolic heart failure ([facility]) 11/20/2018   • Fluid overload 11/20/2018   • Nonischemic cardiomyopathy ([facility]) 09/09/2018   • Paralysis of both lower limbs ([facility]) 07/22/2018   • Chest pain 07/20/2018   • Dizziness 07/13/2018   • Acute conjunctivitis 07/11/2018   • Chronic atrial fibrillation 02/08/2018   • Hyperglycemia 11/11/2017       IMPRESSION AND PLAN:     1.  Status post Interiano's procedure for perforated colon.    1.  Elevated WBC.  I ordered a chest x-ray, urinalysis, and CT scan of the abdomen and pelvis without contrast due to his recent kidney injury.    2.  Diet when tolerated    3.  Continue drain his he is putting out excessive amounts of fluid through his drain.  Serous fluid.    4.  Appreciate medicine support with dig toxicity and multiple medical problems.              ___________________________________   Storm Gan M.D.  [facility].  [phone]    DD: 11/17/2020 DT: 8:43 AM

## 2020-11-18 LAB
ALBUMIN SERPL BCP-MCNC: 2.4 G/DL (ref 3.2–4.9)
ALBUMIN/GLOB SERPL: 0.9 G/DL
ALP SERPL-CCNC: 66 U/L (ref 30–99)
ALT SERPL-CCNC: 7 U/L (ref 2–50)
ANION GAP SERPL CALC-SCNC: 6 MMOL/L (ref 7–16)
AST SERPL-CCNC: 14 U/L (ref 12–45)
BASOPHILS # BLD AUTO: 0.2 % (ref 0–1.8)
BASOPHILS # BLD: 0.03 K/UL (ref 0–0.12)
BILIRUB SERPL-MCNC: 0.7 MG/DL (ref 0.1–1.5)
BUN SERPL-MCNC: 19 MG/DL (ref 8–22)
CALCIUM SERPL-MCNC: 7.5 MG/DL (ref 8.5–10.5)
CHLORIDE SERPL-SCNC: 98 MMOL/L (ref 96–112)
CO2 SERPL-SCNC: 35 MMOL/L (ref 20–33)
CREAT SERPL-MCNC: 0.76 MG/DL (ref 0.5–1.4)
EKG IMPRESSION: NORMAL
EOSINOPHIL # BLD AUTO: 0.81 K/UL (ref 0–0.51)
EOSINOPHIL NFR BLD: 4.5 % (ref 0–6.9)
ERYTHROCYTE [DISTWIDTH] IN BLOOD BY AUTOMATED COUNT: 53.3 FL (ref 35.9–50)
GLOBULIN SER CALC-MCNC: 2.6 G/DL (ref 1.9–3.5)
GLUCOSE BLD-MCNC: 124 MG/DL (ref 65–99)
GLUCOSE BLD-MCNC: 124 MG/DL (ref 65–99)
GLUCOSE BLD-MCNC: 131 MG/DL (ref 65–99)
GLUCOSE BLD-MCNC: 171 MG/DL (ref 65–99)
GLUCOSE SERPL-MCNC: 119 MG/DL (ref 65–99)
HCT VFR BLD AUTO: 35.2 % (ref 42–52)
HGB BLD-MCNC: 10.9 G/DL (ref 14–18)
IMM GRANULOCYTES # BLD AUTO: 0.37 K/UL (ref 0–0.11)
IMM GRANULOCYTES NFR BLD AUTO: 2 % (ref 0–0.9)
LYMPHOCYTES # BLD AUTO: 2.09 K/UL (ref 1–4.8)
LYMPHOCYTES NFR BLD: 11.6 % (ref 22–41)
MAGNESIUM SERPL-MCNC: 1.5 MG/DL (ref 1.5–2.5)
MCH RBC QN AUTO: 25.8 PG (ref 27–33)
MCHC RBC AUTO-ENTMCNC: 31 G/DL (ref 33.7–35.3)
MCV RBC AUTO: 83.4 FL (ref 81.4–97.8)
MONOCYTES # BLD AUTO: 1.15 K/UL (ref 0–0.85)
MONOCYTES NFR BLD AUTO: 6.4 % (ref 0–13.4)
NEUTROPHILS # BLD AUTO: 13.63 K/UL (ref 1.82–7.42)
NEUTROPHILS NFR BLD: 75.3 % (ref 44–72)
NRBC # BLD AUTO: 0.03 K/UL
NRBC BLD-RTO: 0.2 /100 WBC
PLATELET # BLD AUTO: 241 K/UL (ref 164–446)
PMV BLD AUTO: 10.1 FL (ref 9–12.9)
POTASSIUM SERPL-SCNC: 3.7 MMOL/L (ref 3.6–5.5)
PROT SERPL-MCNC: 5 G/DL (ref 6–8.2)
RBC # BLD AUTO: 4.22 M/UL (ref 4.7–6.1)
SODIUM SERPL-SCNC: 139 MMOL/L (ref 135–145)
WBC # BLD AUTO: 18.1 K/UL (ref 4.8–10.8)

## 2020-11-18 PROCEDURE — 700111 HCHG RX REV CODE 636 W/ 250 OVERRIDE (IP): Performed by: HOSPITALIST

## 2020-11-18 PROCEDURE — 82962 GLUCOSE BLOOD TEST: CPT | Mod: 91

## 2020-11-18 PROCEDURE — 80053 COMPREHEN METABOLIC PANEL: CPT

## 2020-11-18 PROCEDURE — A9270 NON-COVERED ITEM OR SERVICE: HCPCS | Performed by: HOSPITALIST

## 2020-11-18 PROCEDURE — 97535 SELF CARE MNGMENT TRAINING: CPT

## 2020-11-18 PROCEDURE — A9270 NON-COVERED ITEM OR SERVICE: HCPCS | Performed by: STUDENT IN AN ORGANIZED HEALTH CARE EDUCATION/TRAINING PROGRAM

## 2020-11-18 PROCEDURE — 97606 NEG PRS WND THER DME>50 SQCM: CPT

## 2020-11-18 PROCEDURE — 700105 HCHG RX REV CODE 258: Performed by: HOSPITALIST

## 2020-11-18 PROCEDURE — 700102 HCHG RX REV CODE 250 W/ 637 OVERRIDE(OP): Performed by: HOSPITALIST

## 2020-11-18 PROCEDURE — 700102 HCHG RX REV CODE 250 W/ 637 OVERRIDE(OP): Performed by: STUDENT IN AN ORGANIZED HEALTH CARE EDUCATION/TRAINING PROGRAM

## 2020-11-18 PROCEDURE — 770020 HCHG ROOM/CARE - TELE (206)

## 2020-11-18 PROCEDURE — 700111 HCHG RX REV CODE 636 W/ 250 OVERRIDE (IP): Performed by: INTERNAL MEDICINE

## 2020-11-18 PROCEDURE — 83735 ASSAY OF MAGNESIUM: CPT

## 2020-11-18 PROCEDURE — 700105 HCHG RX REV CODE 258: Performed by: INTERNAL MEDICINE

## 2020-11-18 PROCEDURE — A9270 NON-COVERED ITEM OR SERVICE: HCPCS | Performed by: INTERNAL MEDICINE

## 2020-11-18 PROCEDURE — 700111 HCHG RX REV CODE 636 W/ 250 OVERRIDE (IP): Performed by: SURGERY

## 2020-11-18 PROCEDURE — 700111 HCHG RX REV CODE 636 W/ 250 OVERRIDE (IP): Performed by: STUDENT IN AN ORGANIZED HEALTH CARE EDUCATION/TRAINING PROGRAM

## 2020-11-18 PROCEDURE — 700102 HCHG RX REV CODE 250 W/ 637 OVERRIDE(OP): Performed by: INTERNAL MEDICINE

## 2020-11-18 PROCEDURE — 99233 SBSQ HOSP IP/OBS HIGH 50: CPT | Mod: GC | Performed by: HOSPITALIST

## 2020-11-18 PROCEDURE — 99232 SBSQ HOSP IP/OBS MODERATE 35: CPT | Performed by: INTERNAL MEDICINE

## 2020-11-18 PROCEDURE — 93010 ELECTROCARDIOGRAM REPORT: CPT | Mod: 26 | Performed by: INTERNAL MEDICINE

## 2020-11-18 PROCEDURE — 85025 COMPLETE CBC W/AUTO DIFF WBC: CPT

## 2020-11-18 PROCEDURE — 700111 HCHG RX REV CODE 636 W/ 250 OVERRIDE (IP): Performed by: NURSE PRACTITIONER

## 2020-11-18 PROCEDURE — 93005 ELECTROCARDIOGRAM TRACING: CPT | Performed by: STUDENT IN AN ORGANIZED HEALTH CARE EDUCATION/TRAINING PROGRAM

## 2020-11-18 PROCEDURE — 97530 THERAPEUTIC ACTIVITIES: CPT

## 2020-11-18 RX ORDER — METOPROLOL TARTRATE 1 MG/ML
5 INJECTION, SOLUTION INTRAVENOUS
Status: DISCONTINUED | OUTPATIENT
Start: 2020-11-18 | End: 2020-12-17 | Stop reason: HOSPADM

## 2020-11-18 RX ORDER — PROCHLORPERAZINE EDISYLATE 5 MG/ML
5 INJECTION INTRAMUSCULAR; INTRAVENOUS EVERY 4 HOURS PRN
Status: DISCONTINUED | OUTPATIENT
Start: 2020-11-18 | End: 2020-11-21

## 2020-11-18 RX ORDER — MAGNESIUM SULFATE HEPTAHYDRATE 40 MG/ML
2 INJECTION, SOLUTION INTRAVENOUS ONCE
Status: COMPLETED | OUTPATIENT
Start: 2020-11-18 | End: 2020-11-18

## 2020-11-18 RX ORDER — POTASSIUM CHLORIDE 20 MEQ/1
40 TABLET, EXTENDED RELEASE ORAL ONCE
Status: COMPLETED | OUTPATIENT
Start: 2020-11-18 | End: 2020-11-18

## 2020-11-18 RX ORDER — METOPROLOL SUCCINATE 50 MG/1
50 TABLET, EXTENDED RELEASE ORAL 2 TIMES DAILY
Status: DISCONTINUED | OUTPATIENT
Start: 2020-11-18 | End: 2020-11-20

## 2020-11-18 RX ADMIN — VANCOMYCIN HYDROCHLORIDE 2000 MG: 500 INJECTION, POWDER, LYOPHILIZED, FOR SOLUTION INTRAVENOUS at 05:54

## 2020-11-18 RX ADMIN — FLUCONAZOLE 400 MG: 200 TABLET ORAL at 06:00

## 2020-11-18 RX ADMIN — METRONIDAZOLE 500 MG: 500 TABLET ORAL at 06:01

## 2020-11-18 RX ADMIN — METOPROLOL SUCCINATE 50 MG: 50 TABLET, EXTENDED RELEASE ORAL at 06:01

## 2020-11-18 RX ADMIN — FENTANYL CITRATE 50 MCG: 50 INJECTION, SOLUTION INTRAMUSCULAR; INTRAVENOUS at 21:00

## 2020-11-18 RX ADMIN — INSULIN HUMAN 3 UNITS: 100 INJECTION, SOLUTION PARENTERAL at 06:07

## 2020-11-18 RX ADMIN — HEPARIN SODIUM 5000 UNITS: 5000 INJECTION, SOLUTION INTRAVENOUS; SUBCUTANEOUS at 22:00

## 2020-11-18 RX ADMIN — METRONIDAZOLE 500 MG: 500 TABLET ORAL at 22:00

## 2020-11-18 RX ADMIN — HEPARIN SODIUM 5000 UNITS: 5000 INJECTION, SOLUTION INTRAVENOUS; SUBCUTANEOUS at 12:46

## 2020-11-18 RX ADMIN — FAMOTIDINE 20 MG: 20 TABLET, FILM COATED ORAL at 06:01

## 2020-11-18 RX ADMIN — SERTRALINE HYDROCHLORIDE 75 MG: 50 TABLET ORAL at 06:00

## 2020-11-18 RX ADMIN — POTASSIUM CHLORIDE 40 MEQ: 1500 TABLET, EXTENDED RELEASE ORAL at 07:19

## 2020-11-18 RX ADMIN — FENTANYL CITRATE 50 MCG: 50 INJECTION, SOLUTION INTRAMUSCULAR; INTRAVENOUS at 15:28

## 2020-11-18 RX ADMIN — PROCHLORPERAZINE MALEATE 5 MG: 5 TABLET ORAL at 01:01

## 2020-11-18 RX ADMIN — MAGNESIUM SULFATE 2 G: 2 INJECTION INTRAVENOUS at 07:19

## 2020-11-18 RX ADMIN — LISINOPRIL 10 MG: 10 TABLET ORAL at 06:01

## 2020-11-18 RX ADMIN — METOPROLOL SUCCINATE 50 MG: 50 TABLET, EXTENDED RELEASE ORAL at 17:08

## 2020-11-18 RX ADMIN — FENTANYL CITRATE 50 MCG: 50 INJECTION, SOLUTION INTRAMUSCULAR; INTRAVENOUS at 01:01

## 2020-11-18 RX ADMIN — SPIRONOLACTONE 25 MG: 50 TABLET ORAL at 06:01

## 2020-11-18 RX ADMIN — OLANZAPINE 5 MG: 5 TABLET, FILM COATED ORAL at 21:00

## 2020-11-18 RX ADMIN — FENTANYL CITRATE 50 MCG: 50 INJECTION, SOLUTION INTRAMUSCULAR; INTRAVENOUS at 05:58

## 2020-11-18 RX ADMIN — METRONIDAZOLE 500 MG: 500 TABLET ORAL at 12:46

## 2020-11-18 RX ADMIN — INSULIN GLARGINE 10 UNITS: 100 INJECTION, SOLUTION SUBCUTANEOUS at 06:08

## 2020-11-18 RX ADMIN — PROCHLORPERAZINE EDISYLATE 5 MG: 5 INJECTION INTRAMUSCULAR; INTRAVENOUS at 11:31

## 2020-11-18 RX ADMIN — HEPARIN SODIUM 5000 UNITS: 5000 INJECTION, SOLUTION INTRAVENOUS; SUBCUTANEOUS at 06:09

## 2020-11-18 RX ADMIN — CEFTRIAXONE SODIUM 2 G: 2 INJECTION, POWDER, FOR SOLUTION INTRAMUSCULAR; INTRAVENOUS at 05:53

## 2020-11-18 RX ADMIN — CETIRIZINE HYDROCHLORIDE 10 MG: 10 TABLET, FILM COATED ORAL at 06:00

## 2020-11-18 RX ADMIN — FENTANYL CITRATE 50 MCG: 50 INJECTION, SOLUTION INTRAMUSCULAR; INTRAVENOUS at 11:32

## 2020-11-18 ASSESSMENT — ENCOUNTER SYMPTOMS
BRUISES/BLEEDS EASILY: 0
DIARRHEA: 0
NAUSEA: 1
HALLUCINATIONS: 0
NERVOUS/ANXIOUS: 1
FEVER: 0
DEPRESSION: 0
NECK PAIN: 0
VOMITING: 1
SORE THROAT: 0
CHILLS: 0
HEADACHES: 0
BLURRED VISION: 0
ABDOMINAL PAIN: 1
DOUBLE VISION: 0
PALPITATIONS: 0
MYALGIAS: 1
DIZZINESS: 0
HEARTBURN: 0
MYALGIAS: 0
SHORTNESS OF BREATH: 0
DEPRESSION: 1
SHORTNESS OF BREATH: 1
POLYDIPSIA: 0
HEMOPTYSIS: 0
COUGH: 0

## 2020-11-18 ASSESSMENT — PAIN DESCRIPTION - PAIN TYPE
TYPE: ACUTE PAIN;SURGICAL PAIN
TYPE: ACUTE PAIN
TYPE: ACUTE PAIN

## 2020-11-18 ASSESSMENT — COGNITIVE AND FUNCTIONAL STATUS - GENERAL
DAILY ACTIVITIY SCORE: 17
MOBILITY SCORE: 6
TURNING FROM BACK TO SIDE WHILE IN FLAT BAD: UNABLE
STANDING UP FROM CHAIR USING ARMS: TOTAL
DRESSING REGULAR UPPER BODY CLOTHING: A LITTLE
SUGGESTED CMS G CODE MODIFIER MOBILITY: CN
MOVING TO AND FROM BED TO CHAIR: UNABLE
TOILETING: A LOT
DRESSING REGULAR LOWER BODY CLOTHING: A LOT
MOVING FROM LYING ON BACK TO SITTING ON SIDE OF FLAT BED: UNABLE
CLIMB 3 TO 5 STEPS WITH RAILING: TOTAL
HELP NEEDED FOR BATHING: A LOT
SUGGESTED CMS G CODE MODIFIER DAILY ACTIVITY: CK
WALKING IN HOSPITAL ROOM: TOTAL

## 2020-11-18 ASSESSMENT — GAIT ASSESSMENTS: GAIT LEVEL OF ASSIST: UNABLE TO PARTICIPATE

## 2020-11-18 NOTE — PROGRESS NOTES
12 hour chart check    Monitor Summary:    Rhythm: AFIB  Rate:    Intervals: -/.08/-   08-Apr-2018 18:18

## 2020-11-18 NOTE — THERAPY
Physical Therapy   Daily Treatment     Patient Name: Sebastián Castro  Age:  55 y.o., Sex:  male  Medical Record #: [id number]  Today's Date: 11/18/2020     Precautions: Fall Risk    Assessment  Pt motivated to participate in therapy. Pt demonstrates improved sitting balance after series of anterior weight shifts. Pt was able to come to standing with xavier FWW and max A, tolerated standing around 3 min but was unable to weight shift to transfer or initiate gait. Pt will benefit from continued acute PT and postacute placement to maximize functional independence and safety. Will follow.     Plan  Continue current treatment plan.  DC Equipment Recommendations: Unable to determine at this time  Discharge Recommendations: Recommend post-acute placement for additional physical therapy services prior to discharge home       11/18/20 1031   Vitals   Vitals Comments was on 0.5, desat to 80s sitting EOB and RN incr to 1L   Neuro-Muscular Treatments   Neuro-Muscular Treatments Anterior weight shift   Comments ant weight shift pulling on therapist to come fwd x5. improved sitting posture and balance after weight shifts and pt able to hold self with intermittent min A.    Balance   Sitting Balance (Static) Fair -   Sitting Balance (Dynamic) Poor +   Standing Balance (Static) Poor -   Standing Balance (Dynamic) Trace +   Weight Shift Sitting Fair   Weight Shift Standing Poor   Comments standing with xavier FWW and mod/max A   Gait Analysis   Gait Level Of Assist Unable to Participate   Comments unable to weight shift standing   Bed Mobility    Supine to Sit Moderate Assist   Sit to Supine Moderate Assist   Scooting Moderate Assist   Comments somewhat impulsive when moving in supine <> sit and requires cues for improved sequencing   Functional Mobility   Sit to Stand Maximal Assist   Bed, Chair, WC Transfer Unable to Participate   Comments attempted STS x3 prior to actually being able to come to upright standing. heavy assist required at  buttocks for lift off and initiation   Short Term Goals    Short Term Goal # 1 Pt to move supine to/from eob, no rails, w/ spv in 6 visits to improve fxl indep   Goal Outcome # 1 goal not met   Short Term Goal # 2 Pt to move sit to/from stand w/ spv in 6 visits w/ spv in 6 visits to improve fxl indep   Goal Outcome # 2 Goal not met   Short Term Goal # 3 Pt to ambulate 150 ft w/ spv in 6 visits to improve fxl indep   Goal Outcome # 3 Goal not met

## 2020-11-18 NOTE — PROGRESS NOTES
Infectious Disease Progress Note    Author: Dianne Fang M.D. Date & Time of service: 2020  1:24 PM       Chief Complaint:  Intrabdominal abscess and bacteremia     Interval History:    AF WBC 9.4 Less pain in side but remains tender to touch. Denies SE Zyvox-counseled about sxs serotonin syndrome   T-max 99.7 WBC 15.4 patient underwent colectomy, colostomy placement and I&D of flank wound with wound VAC placement on 11/10/2020 by Dr. Desouza.  Postop course complicated by hypotension and transferred to the ICU.  On pressors.  Patient complaining of shortness of breath as well as lightheadedness.  Also has postop abdominal pain.   afebrile WBC 13.3.  Patient weaned off pressors overnight.  Operative cultures growing group D Enterococcus and yeast.  Patient feeling better today.  Improved shortness of breath.  Abdominal pain stable   afebrile WBC 13.8 patient continues to feel better.  He is inquiring when he can have food by mouth.   afebrile WBC 9.9.  Patient feeling a bit better today.  His diet has been slowly advanced.  He is however complaining of some sharp abdominal pain when he coughs    AF, O2 RA, abdominal pain and poor appetite.    AF, O2 1 L NC , WBC 19.1. Drain output 465 cc yesterday. Ongoing nausea, poor appetite and abdomen is quite tender on palpation.  Will broaden antibiotics.     Review of Systems:  ROS    Hemodynamics:  Temp (24hrs), Av.6 °C (97.8 °F), Min:35.9 °C (96.7 °F), Max:37.1 °C (98.8 °F)  Temperature: 36.2 °C (97.2 °F)  Pulse  Av.9  Min: 51  Max: 161   Blood Pressure: 141/83      Physical Exam:  Physical Exam    Meds:    Current Facility-Administered Medications:   •  prochlorperazine  •  metoprolol SR  •  Metoprolol Tartrate  •  Respiratory Therapy Consult  •  sertraline  •  OLANZapine  •  MD Alert...Vancomycin per Pharmacy  •  cefTRIAXone (ROCEPHIN) IV  •  metroNIDAZOLE  •  lisinopril  •  spironolactone  •  vancomycin  •   cetirizine  •  famotidine  •  fentaNYL  •  fluconazole  •  insulin glargine  •  heparin  •  insulin regular **AND** POC Blood Glucose **AND** NOTIFY MD and PharmD **AND** glucose **AND** dextrose 50%  •  albuterol    Labs:  Recent Labs     11/16/20  0535 11/17/20  0444 11/18/20  0320   WBC 13.1* 19.1* 18.1*   RBC 4.39* 4.51* 4.22*   HEMOGLOBIN 11.0* 11.9* 10.9*   HEMATOCRIT 35.7* 38.0* 35.2*   MCV 81.3* 84.3 83.4   MCH 25.1* 26.4* 25.8*   RDW 53.4* 54.7* 53.3*   PLATELETCT 318 253 241   MPV 10.0 9.6 10.1   NEUTSPOLYS 67.60 73.10* 75.30*   LYMPHOCYTES 18.90* 13.60* 11.60*   MONOCYTES 8.70 6.60 6.40   EOSINOPHILS 1.60 4.70 4.50   BASOPHILS 0.40 0.20 0.20     Recent Labs     11/16/20  0535 11/17/20  0444 11/18/20  0320   SODIUM 139 138 139   POTASSIUM 3.8 3.4* 3.7   CHLORIDE 100 99 98   CO2 32 35* 35*   GLUCOSE 160* 120* 119*   BUN 31* 24* 19     Recent Labs     11/16/20  0535 11/17/20  0444 11/18/20  0320   ALBUMIN 2.7* 2.5* 2.4*   TBILIRUBIN 0.6 0.8 0.7   ALKPHOSPHAT 75 65 66   TOTPROTEIN 5.4* 4.7* 5.0*   ALTSGPT 9 9 7   ASTSGOT 23 16 14   CREATININE 1.33 1.01 0.76       Imaging:  Ct-abdomen-pelvis W/o    Result Date: 11/17/2020 11/17/2020 1:51 PM HISTORY/REASON FOR EXAM:  Nausea/vomiting; elevated WBC; R/O abscess. TECHNIQUE/EXAM DESCRIPTION: CT scan of the abdomen and pelvis without contrast. Noncontrast helical scanning was obtained from the diaphragmatic domes through the pubic symphysis. Low dose optimization technique was utilized for this CT exam including automated exposure control and adjustment of the mA and/or kV according to patient size. COMPARISON: 11/8/2020. FINDINGS: There is a moderate left and small right pleural effusion with overlying atelectasis/consolidation. Trace free air is seen in the abdomen. Evaluation of parenchymal and vascular structures is limited by the lack of intravenous contrast. The liver appears  unremarkable. There are gallstones within the gallbladder. There is hypodensity  along the anterior aspect of the spleen measuring approximately 4.3 x 2.9 cm. No adrenal mass is identified. There is right renal atrophy with cortical scarring. There is no  evidence of hydronephrosis. Pancreas appears unremarkable. Aorta is not aneurysmal. There are small inguinal, retroperitoneal and mesenteric lymph nodes. There is no evidence of bowel obstruction. Visualized appendix appears unremarkable. There is a left abdominal stoma. There is parastomal density measuring up to 2.8 x 2.1 cm likely representing a postsurgical hematoma. There is a small amount of free fluid in the abdomen and pelvis. Bladder is mildly distended. There is a surgical drain in the left abdomen. There is third spacing. Degenerative changes are seen in the spine. Compression deformities of T11 and T12 are again noted. Bones are demineralized.     Postsurgical changes with a left abdominal stoma. Parastomal densities may be related to postsurgical hematoma measuring up to 2.8 x 2.1 cm. Small amount of free fluid in the abdomen and pelvis. Collection along the anterior spleen measures approximately 2.9 x 4.3 cm is decreased in size compared to the prior examination. Evaluation is limited by lack of intravenous contrast. Third spacing. Trace free intraperitoneal air is likely related to recent surgery. Moderate left and small right pleural effusions with overlying atelectasis/consolidation. Cortical scarring of the right kidney. Cholelithiasis.     Ct-abdomen-pelvis With    Result Date: 11/9/2020 11/8/2020 11:34 PM HISTORY/REASON FOR EXAM:  Abdominal infection suspected; Has perisplenic abscess with possible connection to bowel TECHNIQUE/EXAM DESCRIPTION: CT of the abdomen and pelvis with contrast. Contrast-enhanced helical scanning was obtained from the diaphragmatic domes through the pubic symphysis following the bolus administration of 100 mL of nonionic contrast (Omnipaque 350) without complication. Low dose optimization  technique was utilized for this CT exam including automated exposure control and adjustment of the mA and/or kV according to patient size. COMPARISON: 11/3/2020 CT FINDINGS: The visualized lung bases show diminished small left pleural fluid that layers dependently. There is similar middle and left lower lobe atelectasis . Similar multichamber cardiac enlargement CT Abdomen: There is a new pigtail catheter in the anterior perisplenic fluid collection. The collection does diminish in size now measuring 58 x 33 from 79 x 40 mm. There is still gas within it and it is in direct contact with the splenic flexure of the colon. A small amount of gas is also seen in the more superficial soft tissues inferior to the pigtail catheter which crosses the ninth intercostal space No new abscess is detected Contrast is present in the small bowel but not yet in the transverse colon. No contrast extravasation is seen. No abnormal bowel dilatation The pancreas, adrenal glands and kidneys enhance normally. Mild hepatosplenomegaly Some low-density in the gallbladder likely indicating cholelithiasis There is no lymphadenopathy or aneurysmal change of the aorta. Compression deformities in the spine are again seen with subacute fractures resulting in anterior wedging at T11 and T12 There is diffuse fat stranding indicating anasarca CT Pelvis: Visualized pelvic structures are unremarkable. Prostate is within normal limits for age. No lymphadenopathy. There is no free air or free fluid.     Interval decrease in size of perisplenic abscess. Pigtail catheter now in place Abscess has likely fistulous communication with the abutting splenic flexure of the colon. Colon is otherwise normal in appearance Mild hepatosplenomegaly Decreasing small left pleural effusion Subacute anterior wedge compression fractures in the caudal thoracic spine    Ct-abdomen-pelvis With    Addendum Date: 11/5/2020    Addendum: A prior noncontrast CT of the abdomen and  pelvis from [facility] dated 10/19/2020 was made available for comparison. On the prior study a perisplenic/subcapsular fluid collection was seen however did not contain air at that time and there was no discrete evidence of communication with the transverse colon. Communication with splenic flexure of the colon and air within the collection are new from the prior study.    Result Date: 11/5/2020  11/3/2020 4:02 PM HISTORY/REASON FOR EXAM:  Abd pain, unspecified; IV contrast only. TECHNIQUE/EXAM DESCRIPTION:   CT scan of the abdomen and pelvis with contrast. Contrast-enhanced helical scanning was obtained from the diaphragmatic domes through the pubic symphysis following the bolus administration of nonionic contrast without complication. 100 mL of Omnipaque 350 nonionic contrast was administered without complication. Low dose optimization technique was utilized for this CT exam including automated exposure control and adjustment of the mA and/or kV according to patient size. COMPARISON: 9/26/2018 FINDINGS: Chest Base: Small left pleural effusion with mild bibasilar atelectasis. Heart is mildly enlarged. Liver:  Diffuse hypoattenuation of the liver suggesting fatty infiltration. Moderately enlarged. Gallbladder: Cholelithiasis. Biliary tract: Nondilated. Pancreas: Normal. Spleen: There is a rim-enhancing perisplenic fluid collection which contains air and which communicates with the splenic flexure of the colon. This most likely represents an abscess and measures about 8.7 x 7.9 cm. The anterior aspect of the spleen is nonenhancing with irregular margins could be related to infection of splenic parenchyma or infarction. Adrenals: Normal. Kidneys and Collecting Systems:  There is mild right renal cortical scarring. No hydronephrosis. No renal mass. Gastrointestinal tract:  No bowel obstruction. The appendix is normal. Peritoneum: No free air or free fluid. Reproductive organs:  Normal. Bladder:   Normal. Vessels:  Normal caliber. Lymph  Nodes:  No lymphadenopathy. Abdominal wall: Within normal limits. Bones:  Age indeterminate possibly subacute to chronic T11 and T12 superior endplate compression fractures.     1.  Peripherally enhancing perisplenic fluid collection which communicates with the splenic flexure of the colon and which contains foci of air is most consistent with an abscess/infected fluid collection. There is nonenhancement of the anterior aspect of the spleen suggesting infection/infarction. Surgical consultation is recommended. 2.  Hepatomegaly and hepatic steatosis. 3.  Cholelithiasis. 4.  Right renal cortical scarring. No hydronephrosis. 5.  Small left pleural effusion. Mild bibasilar atelectasis. 6.  Subacute to chronic appearing T11 and T12 superior endplate compression fractures. These findings were discussed with SVETLANA PAREDES on 11/3/2020 4:49 PM.     Ct-drain-peritoneal    Result Date: 11/4/2020 11/4/2020 5:19 PM HISTORY/REASON FOR EXAM:  Joya Splenic Fluid Collection TECHNIQUE/EXAM DESCRIPTION: LUQ parasplenic abscess drainage with CT guidance. Low dose optimization technique was utilized for this CT exam including automated exposure control and adjustment of the mA and/or kV according to patient size. PROCEDURE: Informed consent was obtained. SEDATION: Moderate sedation was provided. Pulse oximetry and continuous cardiac monitoring by the nurse was performed throughout the exam. Intraservice time was 30 minutes. Localizing CT images were obtained with the patient in supine position. The skin was prepped with Betadine and draped in a sterile fashion. Following local anesthesia with 1% Lidocaine, a 17 G guiding needle was placed and needle path confirmed with CT. An Amplatz guidewire was placed and following serial tract dilatation, a 10 Malay pigtail locking catheter was placed. A specimen was collected and submitted for culture and sensitivity and Gram stain. Total of 6 mL of  Brownish fluid was drained. The catheter was secured to the skin and connected to suction bulb drainage. Final CT images were obtained documenting catheter position. The patient tolerated the procedure well with no evidence of complication. COMPARISON: CT scan abdomen and pelvis 11/3/2020 FINDINGS: The final CT images show satisfactory catheter position within the target collection.     1.  CT guided perisplenic abscess catheter drainage. 2.  The current plan is to obtain a follow-up CT in 5-7 days..    Dx-chest-portable (1 View)    Result Date: 11/17/2020 11/17/2020 9:19 AM HISTORY/REASON FOR EXAM: Elevated white blood cell count. TECHNIQUE/EXAM DESCRIPTION AND NUMBER OF VIEWS: Single portable view of the chest. COMPARISON: 11/10/2020 FINDINGS: There are bilateral interstitial and airspace infiltrates. There is small bilateral pleural effusion. The heart is enlarged. Right subclavian central line is again seen.     1.  Bilateral airspace and interstitial infiltrates. 2.  Cardiomegaly.    Dx-chest-portable (1 View)    Result Date: 11/10/2020  11/10/2020 12:29 PM HISTORY/REASON FOR EXAM: new central line. TECHNIQUE/EXAM DESCRIPTION AND NUMBER OF VIEWS: Single AP view of the chest. COMPARISON: November 3 FINDINGS: Hardware: ] Tip overlies the cavoatrial junction Lungs: Linear middle lobe and left basilar opacity is seen. Volumes are low Pleura:  No pneumothorax is identified Heart and mediastinum: There is stable cardiac silhouette enlargement.     No pneumothorax identified following right subclavian line placement Worsening atelectasis with consolidation at the bases not excluded Stable cardiac silhouette enlargement    Dx-chest-portable (1 View)    Result Date: 11/3/2020  11/3/2020 3:41 PM HISTORY/REASON FOR EXAM:  Chest Pain. TECHNIQUE/EXAM DESCRIPTION AND NUMBER OF VIEWS: Single portable view of the chest. COMPARISON: September 10, 2020 FINDINGS: The cardiac size is enlarged. There is interstitial prominence.  Lung aeration has improved. No pneumothorax. No obvious pleural effusion.     Slight interval interval improvement in congestive heart failure.    Us-renal    Result Date: 2020 6:21 PM HISTORY/REASON FOR EXAM:  Abnormal Labs TECHNIQUE/EXAM DESCRIPTION: Renal ultrasound. COMPARISON:  None FINDINGS: The right kidney measures 9.39 cm. The left kidney measures 9.54 cm. There is no hydronephrosis. There are no abnormal calcifications. The bladder not fully the time of imaging.      Examination was limited due to poor sound penetration related to body habitus. Grossly normal appearance of the kidneys.    Ec-echocardiogram Complete W/ Cont    Result Date: 2020  Transthoracic Echo Report Echocardiography Laboratory CONCLUSIONS Normal left ventricular chamber size. Moderately reduced left ventricular systolic function. Left ventricular ejection fraction is visually estimated to be 30-40%; variable related to atrial fibrillation. Mild mitral regurgitation. Moderately dilated right ventricle. Reduced right ventricular systolic function. Right heart pressures are normal. No prior study is available for comparison. EMELY FLORES Exam Date:         2020                    11:34 Exam Location:     Inpatient Priority:          Routine Ordering Physician:        SUZI EWING Referring Physician: Sonographer:               Sherry Funes [facility] Age:    55     Gender:    M MRN:    [id number] :    1965 BSA:    2.7    Ht (in):    72     Wt (lb):    350 Exam Type:     Complete, Contrast Indications:     Congestive Heart Failure ICD Codes:       428 CPT Codes:       95429,  BP:   136    /   86     HR:   100 Technical Quality:       Technically difficult study -                          adequate information is obtained MEASUREMENTS  (Male / Female) Normal Values 2D ECHO LV Diastolic Diameter PLAX        4.6 cm                4.2 - 5.9 / 3.9 - 5.3 cm LV Systolic Diameter PLAX         4.1 cm                 2.1 - 4.0 cm IVS Diastolic Thickness           1.1 cm                LVPW Diastolic Thickness          0.85 cm               LVOT Diameter                     2.5 cm                LV Ejection Fraction MOD 4C       34.7 %                IVC Diameter                      0.95 cm               DOPPLER AV Peak Velocity                  1.3 m/s               AV Peak Gradient                  6.3 mmHg              AV Mean Gradient                  3.9 mmHg              LVOT Peak Velocity                1 m/s                 AV Area Cont Eq vti               3.7 cm2               TR Peak Velocity                  232 cm/s              * Indicates values subject to auto-interpretation LV EF:        % FINDINGS Left Ventricle 3 mL of contrast was administered. Contrast was used to enhance visualization of the endocardial border. Existing IV was used. Mild concentric left ventricular hypertrophy. Normal left ventricular chamber size.  Moderately reduced left ventricular systolic function. Left ventricular ejection fraction is visually estimated to be 30-40%. Diastolic function is difficult to assess with atrial fibrillation. Right Ventricle Moderately dilated right ventricle. Reduced right ventricular systolic function. Right Atrium Enlarged right atrium. Normal inferior vena cava size and inspiratory collapse. Left Atrium Normal left atrial size. Mitral Valve Structurally normal mitral valve. Mild mitral regurgitation. No mitral stenosis. Aortic Valve Tricuspid aortic valve. Structurally normal aortic valve. No stenosis or regurgitation seen. Tricuspid Valve Structurally normal tricuspid valve. Mild tricuspid regurgitation. Right atrial pressure is estimated to be 3 mmHg. Estimated right ventricular systolic pressure  is 25 mmHg. No tricuspid stenosis. Right heart pressures are normal. Pulmonic Valve Structurally normal pulmonic valve without significant stenosis or regurgitation. Pericardium Normal pericardium  without effusion. Aorta The aortic root is normal.  Ascending aorta diameter is 3.6 cm. Germain Pacheco M.D. (Electronically Signed) Final Date:     11 November 2020                 12:38 Amended:        11 November 2020 12:40      Micro:  Results     Procedure Component Value Units Date/Time    URINALYSIS [[id number]]  (Abnormal) Collected: 11/17/20 2302    Order Status: Completed Specimen: Urine, Clean Catch Updated: 11/17/20 2350     Color Yellow     Character Cloudy     Specific Gravity 1.018     Ph 5.0     Glucose Negative mg/dL      Ketones Negative mg/dL      Protein 30 mg/dL      Bilirubin Negative     Urobilinogen, Urine 0.2     Nitrite Negative     Leukocyte Esterase Negative     Occult Blood Small     Micro Urine Req Microscopic    Narrative:      Collected By:[id number] ELDER SENA    CULTURE WOUND W/ GRAM STAIN [[id number]]  (Abnormal)  (Susceptibility) Collected: 11/10/20 1039    Order Status: Completed Specimen: Wound Updated: 11/16/20 1152     Significant Indicator POS     Source WND     Site COLON PERFORATION     Culture Result -     Gram Stain Result Rare WBCs.  Rare Gram positive cocci.  Rare Gram positive rods.       Culture Result Enterococcus faecalis  Light growth        Candida albicans  Light growth      Susceptibility     Enterococcus faecalis (1)     Antibiotic Interpretation Microscan Method Status    Daptomycin Sensitive 4 mcg/mL ISAMAR Final    Ampicillin Sensitive <=2 mcg/mL ISAMAR Final    Gent Synergy Sensitive <=500 mcg/mL ISAMAR Final    Vancomycin Sensitive 1 mcg/mL ISAMAR Final    Penicillin Sensitive 8 mcg/mL ISAMAR Final                   Anaerobic Culture [[id number]] Collected: 11/10/20 1039    Order Status: Completed Specimen: Wound Updated: 11/16/20 1152     Significant Indicator NEG     Source WND     Site COLON PERFORATION     Culture Result No Anaerobes isolated.          Assessment:  Active Hospital Problems    Diagnosis   • *Elevated digoxin level [R78.89]   • Leucocytosis [D72.829]    • Spontaneous perforation of colon (HCC) [K63.1]   • SRUTHI (acute kidney injury) (HCC) [N17.9]   • Adrenal insufficiency (HCC) [E27.40]   • Sepsis (HCC) [A41.9]   • LV dysfunction [I51.9]   • Paroxysmal A-fib with RVR (HCC) [I48.0]   • Type 2 diabetes mellitus, without long-term current use of insulin, with peripheral neuropathy (HCC) [E11.9]   • Schizophrenia (HCC) [F20.9]     Interval 24 hours:      AF, O2 1 L NC   Labs reviewed, WBC 18.1   Imaging personally reviewed both images and report.   Micro reviewed  Drain output- 610 cc    Patient still has abdominal pain and significant nausea.  Examined abdominal wound photos and discussed with wound care team.  He has a large wound but clean appearing base with granulation tissue.  No obvious infection in the surrounding tissues.  Left side wound with packing in place, ongoing drainage but clearing .     Assessment:  55 y.o. diabetic male admitted 11/3/2020.+ A. fib, CHD, and severe morbid obesity.  Seen by ID in the past for right foot abscess with cultures positive for MRSA and Proteus.  s/p GLF approximately 1 month ago and was admitted to [facility] with left upper quadrant pain.  CT at the time revealed a subcapsular splenic hematoma. Presented to ED complaining of abdominal pain.     Hospital Course:   CT scan on 11/3 revealed a peripherally enhancing perisplenic fluid collection measuring 8.7 x 7.9 cm, communicating with the splenic flexure of the colon and contained foci of air consistent with abscess/infected fluid.  Also noted nonenhancing of the anterior aspect of the spleen suggesting infection/infarction.    Status post surgery on 11/10/2020     Postop hypotension, resolved  Leukocytosis -ongoing, remains elevated     Acute kidney injury- improved   Renal ultrasound-unremarkable  Nephrology following     Bacteremia  -Blood cultures on 11/31/2 + viridans streptococci, possible contaminant  -Blood culture on 11/5 with no growth to date     Perisplenic  abscess with possible communication to the bowel  Left upper quadrant abscess  Afebrile  Splenic enhancement on CT, suggesting infection or infarction  -Drainage on 11/4 and peritoneal fluid Enterococcus faecalis, ampicillin sensitive and C albicans  Recent ground-level fall located by a subcapsular splenic hematoma   Status post segmental colectomy, colostomy, irrigation and debridement of flank wound and wound VAC placement on 11/10/2020 by Dr. Desouza.  Patient found to have a left upper quadrant abscess inferior to the spleen with a large bowel wall defect.  Also noted to have an abdominal wall abscess.  Operative cultures (colon perforation) -ampicillin sensitive Enterococcus faecalis and Candida albicans.  Gram stain+ GPC, GPR  -CT abdomen pelvis without on 11/17-hypodensity along anterior aspect of the spleen measuring 4.3 x 2.9 cm.  Parastomal density measuring two-point by 2.1 cm likely representing postsurgical hematoma.  This is decreased in size from prior.  Small amount of free fluid in the abdomen pelvis.     Type II diabetes mellitus  Keep BS under 150 to help control current infection     A. Fib  Contributing to hypotension  Cards following      Diarrhea  -C. difficile tested negative on 11/4     Antibiotic allergies: Daptomycin rash, penicillins rash and hives, tolerates cephalosporins-discussed allergies and he states that he has developed significant rash and hives with penicillins several times, most recently a year and a half ago- will avoid penicillins     Nausea and poor appetite-potentially related to linezolid      Plan:      ---  Continue vancomycin to treat the Enterococcus, continue ceftriaxone to provide gram-negative coverage and will start Flagyl for anaerobic coverage given ongoing significant drainage and elevated WBC  --- Continue fluconazole 400 mg daily  for the Candida  --- Monitor CBC and CMP   --- Avoid nephrotoxins and renally dose medication  --- Repeat CT abdomen and pelvis  with contrast preferably 1 week from prior and then can likely give final recommendations  --- Monitor drain output, still significant.  Drain management per surgery     Discussed with wound care team at bedside.  ID will follow.

## 2020-11-18 NOTE — PROGRESS NOTES
Assumed care of patient. Bedside report, received from Jessica PÉREZ. Updated POC, call light within reach, and fall precautions in place. Bed locked and and in lowest position. Patient instructed to call for assistance before getting out of bed. All questions answered, no further needs at this time.

## 2020-11-18 NOTE — PROGRESS NOTES
Received call that patient continues to experience severe  Nausea.  Reviewed most recent EKG today with QTc of 535. Discussed with patient concern for risk of arrythmia with nausea medication and informed patient we would choose a medication with lower risk. Patient reported understanding and stated he would still like something for nausea. Ordered Prochlorperazine 5mg once. If patient continues to request medication, will repeat EKG.

## 2020-11-18 NOTE — NON-PROVIDER
Daily Progress Note:     Date of Service: 11/18/2020  Primary Team: [facility] Red Team  and [facility] Yellow Team   Attending: JONATHAN Myrick M.D.   Senior Resident: Dr. Callaway  Medical Student: Layo Harp, Student  Contact:  [phone]    Chief Complaint:   Intraabdominal abscess with perforated splenic flexure of the absess    ID:  Patient is a 55 y.o. male who underwent a colectomy and ostomy placement for an intraabdominal abscess with perforated splenic flexure with a PMH of afib, DM2, hyperlipidemia and CHF.  The patient also began to have digoxin toxicity on 11/16.    Interval update: Patient did well overnight, but is tachycardic this morning with a slightly elevated BP.  WBC has only improved very slightly from 19.1-18.1. His blood glucose has been maintained in the 100s.  EKG continues to show Afib, but QTC prolongation lowered to 424. He has SOB still, but his nausea and vomiting have improved since yesterday and is less fatigued.  He no longer has yellow hallucinations from digoxin toxicity. He reports feeling slightly more depressed and anxious than before which he attributes to his time spent in the hospital and worry about whether is will be getting better.       Consultants/Specialty:  ID  Surgery  Cardio  Nephro    Review of Systems:    Review of Systems   Constitutional: Positive for malaise/fatigue. Negative for chills and fever.   HENT: Negative for congestion, hearing loss and sore throat.    Eyes: Negative for blurred vision and double vision.   Respiratory: Positive for shortness of breath. Negative for cough.    Cardiovascular: Negative for chest pain.   Gastrointestinal: Positive for abdominal pain, nausea and vomiting.   Musculoskeletal: Negative for myalgias.   Neurological: Negative for headaches.   Psychiatric/Behavioral: Positive for depression. Negative for hallucinations. The patient is nervous/anxious.        Objective:   Physical Exam:   Vitals:   Temp:  [35.9 °C (96.7  °F)-37.1 °C (98.8 °F)] 36.4 °C (97.6 °F)  Pulse:  [] 87  Resp:  [16-19] 16  BP: ()/(48-86) 138/79  SpO2:  [90 %-97 %] 97 %    Physical Exam  Constitutional:       Appearance: He is obese.   HENT:      Head: Normocephalic and atraumatic.      Mouth/Throat:      Mouth: Mucous membranes are moist.   Eyes:      Extraocular Movements: Extraocular movements intact.      Pupils: Pupils are equal, round, and reactive to light.   Cardiovascular:      Rate and Rhythm: Normal rate and regular rhythm.      Heart sounds: Normal heart sounds.   Pulmonary:      Effort: Pulmonary effort is normal.      Breath sounds: Normal breath sounds.   Abdominal:      General: Abdomen is flat. There is no distension.      Palpations: Abdomen is soft.   Skin:     General: Skin is warm and dry.   Neurological:      General: No focal deficit present.      Mental Status: He is alert.   Psychiatric:      Comments: Patient expresses concern about hospital stay and reports feeling depressed and anxious           Labs:   Recent Labs     11/16/20 0535 11/17/20 0444 11/18/20  0320   WBC 13.1* 19.1* 18.1*   RBC 4.39* 4.51* 4.22*   HEMOGLOBIN 11.0* 11.9* 10.9*   HEMATOCRIT 35.7* 38.0* 35.2*   MCV 81.3* 84.3 83.4   MCH 25.1* 26.4* 25.8*   RDW 53.4* 54.7* 53.3*   PLATELETCT 318 253 241   MPV 10.0 9.6 10.1   NEUTSPOLYS 67.60 73.10* 75.30*   LYMPHOCYTES 18.90* 13.60* 11.60*   MONOCYTES 8.70 6.60 6.40   EOSINOPHILS 1.60 4.70 4.50   BASOPHILS 0.40 0.20 0.20     Recent Labs     11/16/20 0535 11/17/20 0444 11/18/20  0320   SODIUM 139 138 139   POTASSIUM 3.8 3.4* 3.7   CHLORIDE 100 99 98   CO2 32 35* 35*   GLUCOSE 160* 120* 119*   BUN 31* 24* 19     Recent Labs     11/16/20 0535 11/17/20 0444 11/18/20  0320   ALBUMIN 2.7* 2.5* 2.4*   TBILIRUBIN 0.6 0.8 0.7   ALKPHOSPHAT 75 65 66   TOTPROTEIN 5.4* 4.7* 5.0*   ALTSGPT 9 9 7   ASTSGOT 23 16 14   CREATININE 1.33 1.01 0.76       Imaging:   CT-ABDOMEN-PELVIS W/O   Final Result      Postsurgical changes  with a left abdominal stoma. Parastomal densities may be related to postsurgical hematoma measuring up to 2.8 x 2.1 cm.      Small amount of free fluid in the abdomen and pelvis.      Collection along the anterior spleen measures approximately 2.9 x 4.3 cm is decreased in size compared to the prior examination. Evaluation is limited by lack of intravenous contrast.      Third spacing.      Trace free intraperitoneal air is likely related to recent surgery.      Moderate left and small right pleural effusions with overlying atelectasis/consolidation.      Cortical scarring of the right kidney.      Cholelithiasis.         DX-CHEST-PORTABLE (1 VIEW)   Final Result      1.  Bilateral airspace and interstitial infiltrates.      2.  Cardiomegaly.      US-RENAL   Final Result    Examination was limited due to poor sound penetration related to body habitus.   Grossly normal appearance of the kidneys.      EC-ECHOCARDIOGRAM COMPLETE W/ CONT   Final Result      DX-CHEST-PORTABLE (1 VIEW)   Final Result      No pneumothorax identified following right subclavian line placement      Worsening atelectasis with consolidation at the bases not excluded      Stable cardiac silhouette enlargement      CT-ABDOMEN-PELVIS WITH   Final Result      Interval decrease in size of perisplenic abscess. Pigtail catheter now in place      Abscess has likely fistulous communication with the abutting splenic flexure of the colon. Colon is otherwise normal in appearance      Mild hepatosplenomegaly      Decreasing small left pleural effusion      Subacute anterior wedge compression fractures in the caudal thoracic spine      CT-DRAIN-PERITONEAL   Final Result      1.  CT guided perisplenic abscess catheter drainage.   2.  The current plan is to obtain a follow-up CT in 5-7 days..      OUTSIDE IMAGES-CT CHEST   Final Result      OUTSIDE IMAGES-CT ABDOMEN /PELVIS   Final Result      CT-ABDOMEN-PELVIS WITH   Final Result   Addendum 1 of 1   Addendum:    A prior noncontrast CT of the abdomen and pelvis from [facility] dated 10/19/2020 was made available for comparison. On the    prior study a perisplenic/subcapsular fluid collection was seen however    did not contain air at that time and    there was no discrete evidence of communication with the transverse colon.    Communication with splenic flexure of the colon and air within the    collection are new from the prior study.      Final      DX-CHEST-PORTABLE (1 VIEW)   Final Result      Slight interval interval improvement in congestive heart failure.          Assessment and Plan:     #Post op for perforation of colon with colectomy and ostomy  Patient has improving symptoms and has gone from a WBC of 19.1 to 18.1.  Increase in WBC may be due to ongoing infection vs stress. Patient has been afebrile for the last 24 hours.  Enterococcus faecalis and candida albicans grew from surgical culture.     -IV ceftriaxone and metronidazole and Fluconazole continued  -Repeat CBC next AM       #A-fib with RVR  Patient was not a candidate for anticoagulation due to frequent falls from orthostatic hypotension.  Digoxin was started but is no longer to be used to due to ongoing toxicity. Patient was tachycardic on 11/18 morning.    -Increased dose of metoprolol from 50 per day to 50 BID.     #QTC prolongation  Patient was found to have a QTC prolongation of 535 on 11/17.  Cause may be due to multiple medications including sertraline, olanzapine, fluconazole, linezolid and zofran.  Risk of Torsades. Now patient has QTC of 424 on 11/18 which is back to baseline      -Zofran was discontinued  -Linezolid switched to Vancomycin  -Sertraline and olanzipine dose lowered  -Problem has resolved     #Left Ventricular heart failure  Patient has left ventricular ejection fraction estimated to be 35% with normal size. Patient sustained SRUTHI leading to discontinuation of lisinopril and spironolactone, but now is back on  these medications.     -Patient currently taking metoprolol, lisinopril, spirolactone      #Type 2 DM  Patient's blood glucose is well controlled with ranges under 150 per ID, blood glucose has been controlled under in the low 100s with a one time reading of 171.      -Continue current sliding scale insulin regimen  -BG checks every 6 hours    #Digoxin Toxicity   Patient was started on digoxin for CHF with normal levels of 1.5 on 11/14.  Patient began to have worsening nausea and developed vomiting and hallucinating yellow shapes beginning 11/16.  He was found to have a digoxin level >5 and digoxin was discontinued.  His levels lowered to 0.8 (11/17) .  The increase in levels may have been due to concurrent usage of fluconazole a fbp075 inhibitor. Patient no longer is having hallucinations related to digoxin toxicity.     -Patient no longer has digoxin toxicity

## 2020-11-18 NOTE — PROGRESS NOTES
Pharmacy Kinetics 55 y.o. male on vancomycin day # 2  2020    Currently on Vancomycin 2000 mg iv q24hr  Provider specified end date: per ID    Indication for Treatment: IAI/abscess    Pertinent history per medical record: Admitted on 11/3/2020 for concern for abdominal pain after procedure found to have colon perforation. ID and surgery consulted.    Other antibiotics: ceftriaxone 2 g iv q 24 hrs, Flagyl 500 mg PO q 8hrs, Fluconazole 400 mg PO q 24 hrs     Allergies: Daptomycin, Pcn [penicillins], Unasyn [ampicillin-sulbactam sodium], and Vancomycin     List concerns for renal function: BUN/SCr ratio > 20:1, obesity (BMI 53), low albumin    Pertinent cultures to date:   11/10/20: COLON PERFORATION = Enterococcus faecalis and candida albicans    MRSA nares swab if pneumonia is a concern (ordered/positive/negative/n-a): n/a    Recent Labs     20  0535 20  0444 20  0320   WBC 13.1* 19.1* 18.1*   NEUTSPOLYS 67.60 73.10* 75.30*     Recent Labs     20  0535 20  0444 20  0320   BUN 31* 24* 19   CREATININE 1.33 1.01 0.76   ALBUMIN 2.7* 2.5* 2.4*     No results for input(s): VANCOTROUGH, VANCOPEAK, VANCORANDOM in the last 72 hours.    Intake/Output Summary (Last 24 hours) at 2020 1433  Last data filed at 2020 0600  Gross per 24 hour   Intake --   Output 250 ml   Net -250 ml      /83   Pulse (!) 102   Temp 36.2 °C (97.2 °F) (Temporal)   Resp 16   Ht 1.829 m (6')   Wt (!) 179 kg (394 lb 10 oz)   SpO2 99%  Temp (24hrs), Av.6 °C (97.8 °F), Min:35.9 °C (96.7 °F), Max:37.1 °C (98.8 °F)      A/P   1. Vancomycin dose change: No  2. Next vancomycin level: 0530 on   3. Goal trough: 10-15 mcg/ml  4. Comments: Renal function appears stable. Pt may get Chaz's syndrome so vanco infusion is slower. Will check vanco trough level tomorrow. ID is following.    Noah Peacock, PharmD

## 2020-11-18 NOTE — PROGRESS NOTES
General Surgery Progress Note  [facility]      CHIEF COMPLAINT: Abdominal pain.     HISTORY OF PRESENT ILLNESS: The patient is a 55 y.o. male, who presents with perforated splenic flexure.  He underwent a Interiano's procedure.  He subsequently has been transferred to telemetry for digoxin toxicity.  He is currently complaining of nausea.  He is otherwise doing well.  His ostomy is functioning.     INTERVAL UPDATE: CT scan without obvious source for white count.  Urinalysis okay.  Chest x-ray with pulmonary infiltrates.    PAST MEDICAL HISTORY:  has a past medical history of A-fib ([facility]), Abscess (11/8/2020), Bacteremia (11/8/2020), Chest pain, Congestive heart failure ([facility]), Diabetes ([facility]), Diabetic neuropathy ([facility]), Hypercholesterolemia, Hypertension, Longstanding persistent atrial fibrillation ([facility]) (11/8/2020), LV dysfunction (11/8/2020), Morbid obesity ([facility]), NICM (nonischemic cardiomyopathy) ([facility]) (11/8/2020), Noncompliance, Normal cardiac stress test, Orthostatic hypotension, and Sepsis ([facility]) (11/8/2020).     PAST SURGICAL HISTORY:  has a past surgical history that includes toe amputation (Right, 11/10/2017); toe amputation (Right, 1/17/2018); irrigation & debridement ortho (Right, 2/19/2018); zzz cardiac cath (07/21/2018); and colectomy (N/A, 11/10/2020).     ALLERGIES:   Allergies   Allergen Reactions   • Daptomycin Rash     Body rash  RXN=1/2018     • Pcn [Penicillins] Hives and Rash      Rash & hives  RXN=since a kid  Tolerates cephalosporins   • Unasyn [Ampicillin-Sulbactam Sodium] Hives, Itching and Swelling   • Vancomycin Rash     Red man syndrome. Tolerates IV vancomycin at reduced infusion rate.        CURRENT MEDICATIONS:   Home Medications     Reviewed by James Perez on 11/03/20 at 1859  Med List Status: Complete   Medication Last Dose Status   aspirin EC 81 MG EC tablet 11/3/2020 Active   cefdinir (OMNICEF) 300 MG Cap 11/3/2020 Active   doxycycline monohydrate (ADOXA) 100 MG  tablet 11/3/2020 Active   furosemide (LASIX) 40 MG Tab 11/3/2020 Active   lisinopril (PRINIVIL) 10 MG Tab 11/3/2020 Active   metoprolol SR (TOPROL XL) 25 MG TABLET SR 24 HR 11/3/2020 Active   metroNIDAZOLE (FLAGYL) 500 MG Tab 11/3/2020 Active   olanzapine (ZYPREXA) 7.5 MG tablet 11/2/2020 Active   sertraline (ZOLOFT) 100 MG Tab 11/3/2020 Active   sertraline (ZOLOFT) 25 MG tablet 11/3/2020 Active   spironolactone (ALDACTONE) 25 MG Tab  Active                FAMILY HISTORY:   Family History   Problem Relation Age of Onset   • No Known Problems Mother    • No Known Problems Father         SOCIAL HISTORY:   Social History     Tobacco Use   • Smoking status: Never Smoker   • Smokeless tobacco: Never Used   Substance and Sexual Activity   • Alcohol use: No   • Drug use: No   • Sexual activity: Not on file       REVIEW OF SYSTEMS: Comprehensive review of systems was negative aside from nausea    PHYSICAL EXAMINATION:     GENERAL: The patient is in no acute distress.   VITAL SIGNS: /86   Pulse (!) 113   Temp 37.1 °C (98.8 °F) (Temporal)   Resp 19   Ht 1.829 m (6')   Wt (!) 179 kg (394 lb 10 oz)   SpO2 96%   HEAD AND NECK: Demonstrates symmetric, reactive pupils. Extraocular muscles   are intact. Nares and oropharynx are clear.   NECK: Supple. No adenopathy.  CHEST:No respiratory distress.    CARDIOVASCULAR: Tachycardic.  The extremities are well perfused.   ABDOMEN: Wound VAC in place.  Ostomy functioning.  Drain serosanguineous.   EXTREMITIES: Examination of the upper and lower extremities demonstrates no cyanosis edema or clubbing.  NEUROLOGIC: Alert & oriented x 3, Normal motor function, Normal sensory function, No focal deficits noted    Drain output:   Output by Drain (mL) 11/16/20 0700 - 11/16/20 1859 11/16/20 1900 - 11/17/20 0659 11/17/20 0700 - 11/17/20 1859 11/17/20 1900 - 11/18/20 0659 11/18/20 0700 - 11/18/20 0855   Patient has no LDAs of requested type attached.        LABORATORY VALUES:   Recent  Labs     11/16/20  0535 11/17/20 0444 11/18/20  0320   WBC 13.1* 19.1* 18.1*   RBC 4.39* 4.51* 4.22*   HEMOGLOBIN 11.0* 11.9* 10.9*   HEMATOCRIT 35.7* 38.0* 35.2*   MCV 81.3* 84.3 83.4   MCH 25.1* 26.4* 25.8*   MCHC 30.8* 31.3* 31.0*   RDW 53.4* 54.7* 53.3*   PLATELETCT 318 253 241   MPV 10.0 9.6 10.1     Recent Labs     11/16/20  0535 11/17/20  0444 11/18/20  0320   SODIUM 139 138 139   POTASSIUM 3.8 3.4* 3.7   CHLORIDE 100 99 98   CO2 32 35* 35*   GLUCOSE 160* 120* 119*   BUN 31* 24* 19   CREATININE 1.33 1.01 0.76   CALCIUM 8.0* 7.4* 7.5*     Recent Labs     11/16/20  0535 11/17/20  0444 11/18/20  0320   ASTSGOT 23 16 14   ALTSGPT 9 9 7   TBILIRUBIN 0.6 0.8 0.7   ALKPHOSPHAT 75 65 66   GLOBULIN 2.7 2.2 2.6            IMAGING:   CT-ABDOMEN-PELVIS W/O   Final Result      Postsurgical changes with a left abdominal stoma. Parastomal densities may be related to postsurgical hematoma measuring up to 2.8 x 2.1 cm.      Small amount of free fluid in the abdomen and pelvis.      Collection along the anterior spleen measures approximately 2.9 x 4.3 cm is decreased in size compared to the prior examination. Evaluation is limited by lack of intravenous contrast.      Third spacing.      Trace free intraperitoneal air is likely related to recent surgery.      Moderate left and small right pleural effusions with overlying atelectasis/consolidation.      Cortical scarring of the right kidney.      Cholelithiasis.         DX-CHEST-PORTABLE (1 VIEW)   Final Result      1.  Bilateral airspace and interstitial infiltrates.      2.  Cardiomegaly.      US-RENAL   Final Result    Examination was limited due to poor sound penetration related to body habitus.   Grossly normal appearance of the kidneys.      EC-ECHOCARDIOGRAM COMPLETE W/ CONT   Final Result      DX-CHEST-PORTABLE (1 VIEW)   Final Result      No pneumothorax identified following right subclavian line placement      Worsening atelectasis with consolidation at the bases not  excluded      Stable cardiac silhouette enlargement      CT-ABDOMEN-PELVIS WITH   Final Result      Interval decrease in size of perisplenic abscess. Pigtail catheter now in place      Abscess has likely fistulous communication with the abutting splenic flexure of the colon. Colon is otherwise normal in appearance      Mild hepatosplenomegaly      Decreasing small left pleural effusion      Subacute anterior wedge compression fractures in the caudal thoracic spine      CT-DRAIN-PERITONEAL   Final Result      1.  CT guided perisplenic abscess catheter drainage.   2.  The current plan is to obtain a follow-up CT in 5-7 days..      OUTSIDE IMAGES-CT CHEST   Final Result      OUTSIDE IMAGES-CT ABDOMEN /PELVIS   Final Result      CT-ABDOMEN-PELVIS WITH   Final Result   Addendum 1 of 1   Addendum:   A prior noncontrast CT of the abdomen and pelvis from [facility] dated 10/19/2020 was made available for comparison. On the    prior study a perisplenic/subcapsular fluid collection was seen however    did not contain air at that time and    there was no discrete evidence of communication with the transverse colon.    Communication with splenic flexure of the colon and air within the    collection are new from the prior study.      Final      DX-CHEST-PORTABLE (1 VIEW)   Final Result      Slight interval interval improvement in congestive heart failure.          Problem List:   Patient Active Problem List    Diagnosis Date Noted   • Elevated digoxin level 11/17/2020     Priority: High   • Leucocytosis 11/17/2020     Priority: High   • Spontaneous perforation of colon ([facility]) 11/10/2020     Priority: High   • Orthostatic hypotension 11/27/2018     Priority: High   • Acute on chronic diastolic heart failure ([facility]) 11/21/2018     Priority: High   • Fall 07/10/2018     Priority: High   • Atypical chest pain 07/09/2018     Priority: High   • Diabetic foot ([facility]) 02/17/2018     Priority: High   • Diabetic  infection of right foot ([facility]) 02/02/2018     Priority: High   • Lower limb amputation, great toe ([facility]) 01/15/2018     Priority: High   • Gangrene of toe of right foot, s/p recent amputation ([facility]) 11/11/2017     Priority: High   • SRUTHI (acute kidney injury) ([facility]) 11/11/2020     Priority: Medium   • Adrenal insufficiency ([facility]) 11/10/2020     Priority: Medium   • Sepsis ([facility]) 11/08/2020     Priority: Medium   • LV dysfunction 11/08/2020     Priority: Medium   • Fall from ground level 01/09/2019     Priority: Medium   • Lumbar compression fracture ([facility]) 12/29/2018     Priority: Medium   • Psychogenic nonepileptic seizure 11/24/2018     Priority: Medium   • Bilateral leg edema 11/20/2018     Priority: Medium   • Venous stasis dermatitis of both lower extremities 11/20/2018     Priority: Medium   • Urinary retention 09/27/2018     Priority: Medium   • Other chest pain 09/10/2018     Priority: Medium   • Difficulty voiding 09/10/2018     Priority: Medium   • Vitamin B12 deficiency 07/24/2018     Priority: Medium   • Paroxysmal A-fib with RVR ([facility]) 07/09/2018     Priority: Medium   • Left knee pain 11/23/2017     Priority: Medium   • Class 2 obesity due to excess calories without serious comorbidity with body mass index (BMI) of 37.0 to 37.9 in adult 10/22/2018     Priority: Low   • Acquired circulating anticoagulants ([facility]) 07/09/2018     Priority: Low   • Essential hypertension 02/08/2018     Priority: Low   • GERD (gastroesophageal reflux disease) 01/15/2018     Priority: Low   • Mixed hyperlipidemia 01/15/2018     Priority: Low   • Type 2 diabetes mellitus, without long-term current use of insulin, with peripheral neuropathy ([facility]) 11/22/2017     Priority: Low   • Candidal intertrigo 11/11/2017     Priority: Low   • Obesity 11/11/2017     Priority: Low   • Schizophrenia ([facility]) 11/17/2020   • NICM (nonischemic cardiomyopathy) ([facility]) 11/08/2020   • Debility 10/26/2019   • Polypharmacy 10/20/2019   • Pre-diabetes 10/19/2019    • Chronic systolic heart failure (HCC) 11/20/2018   • Fluid overload 11/20/2018   • Nonischemic cardiomyopathy (HCC) 09/09/2018   • Paralysis of both lower limbs (HCC) 07/22/2018   • Chest pain 07/20/2018   • Dizziness 07/13/2018   • Acute conjunctivitis 07/11/2018   • Chronic atrial fibrillation 02/08/2018   • Hyperglycemia 11/11/2017       IMPRESSION AND PLAN:     1.  Status post Interiano's procedure.    1.  No obvious source on CT scan of the abdomen.  Infiltrates on chest x-ray.  WBC slightly improved today.  2.  Diet as tolerated  3.  Must mobilize.  Needs physical therapy and Occupational Therapy.              ___________________________________   Storm Gan M.D.  [facility].  [phone]    DD: 11/18/2020 DT: 8:55 AM

## 2020-11-18 NOTE — THERAPY
"Occupational Therapy  Daily Treatment     Patient Name: Sebastián Castro  Age:  55 y.o., Sex:  male  Medical Record #: [id number]  Today's Date: 11/18/2020     Precautions: (P) Fall Risk  Comments: BETTE drain, wound vac    Assessment  Pt continues to be limited by fatigue, generalized weakness, and nausea impacting his ability to complete ADLs and functional transfers. Pt has good insight to his deficits and reports he wants to go to a rehab facility to get stronger. Will continue to follow in this setting.     Plan  Continue current treatment plan.    DC Equipment Recommendations: Unable to determine at this time  Discharge Recommendations: Recommend post-acute placement for daily high intensity occupational therapy services prior to discharge home    Subjective  \"I am not having the best day\"     Objective   11/18/20 1335   Vitals   Vitals Comments pt reported feeling nauseous    Pain 0 - 10 Group   Therapist Pain Assessment During Activity  (minimal c/o of pain)   Cognition    Cognition / Consciousness WDL   Level of Consciousness Alert   Comments pleasant and cooperative   Active ROM Upper Body   Active ROM Upper Body  WDL   Strength Upper Body   Upper Body Strength  WDL   Comments generalized weakness   Sensation Upper Body   Upper Extremity Sensation  WDL   Upper Body Muscle Tone   Upper Body Muscle Tone  WDL   Other Treatments   Other Treatments Provided Pt was feeling naseous with sitting up, so focused on ADLs in bed   Bed Mobility    Supine to Sit Maximal Assist  (pt reported having more difficulty today)   Skilled Intervention Verbal Cuing;Tactile Cuing;Facilitation   Comments partial supine to sit, then back to supine d/t nausea   Activities of Daily Living   Grooming Supervision;Seated  (in bed)   Skilled Intervention Verbal Cuing   Functional Mobility   Skilled Intervention Verbal Cuing   Comments pt did not participate d/t nasusea   Activity Tolerance   Comments limited by fatigue, weakness and nausea "   Patient / Family Goals   Patient / Family Goal #1 To get my strength back   Short Term Goals   Short Term Goal # 1 Pt will sit EOB & groom wtih supervision   Goal Outcome # 1 Goal not met   Short Term Goal # 2 Pt will dress LB with AE & Min A   Goal Outcome # 2 Goal not met   Short Term Goal # 3 Pt will be Mod A with BSC transfers   Goal Outcome # 3 Goal not met   Education Group   Education Provided Role of Occupational Therapist;Activities of Daily Living   Role of Occupational Therapist Patient Response Patient;Acceptance;Explanation   ADL Patient Response Patient;Acceptance;Explanation;Verbal Demonstration   Interdisciplinary Plan of Care Collaboration   IDT Collaboration with  Nursing   Patient Position at End of Therapy In Bed;Call Light within Reach;Tray Table within Reach;Phone within Reach   Collaboration Comments RN updated   Session Information   Date / Session Number  11/18 3(1/3 11/24)   Priority 3

## 2020-11-18 NOTE — PROGRESS NOTES
Daily Progress Note:     Date of Service: 11/18/2020  Primary Team: [facility] IM Yellow Team   Attending: JONATHAN Myrick M.D.   Senior Resident: Dr. Peace (Dr. Callaway 11/18)    Contact:  [phone]    ID: 55-year-old male with past medical history of obesity BMI of 53,  type 2 diabetes mellitus,  atrial fibrillation, congestive heart failure EF of 30% 11/2020 schizophrenia with depression had a history of ground-level fall 6 weeks ago CT abdomen showed subcapsular splenic hematoma seen and discharged on oral antibiotic.  Patient presented on 11/3 with history of increasing abdominal pain, CT abdomen at that time showed intra-abdominal abscess admitted for surgery and got surgery done showed perforated splenic flexure and received colectomy on 11/10 with ostomy placement.  Patient had A. fib with RVR started on digoxin by cardiology on 11/14 levels checked 11/14 1.5 and 11/16 >5.  Transfer to telemetry for further management.  Did not receive antibody to digoxin but repeat digoxin level is 0.8.    Subjective:   No acute events overnight, patient has continued nausea, no increase in abdominal pain, no vomiting  Mood/hallucinations are not worsened on lower dose of medication, QTC improved with discontinuation of linezolid and reduced doses of SSRI and olanzapine  Received Compazine for nausea which did help, can continue  Labs stable, WBC improved to 18.1 from 19.1    Consultants/Specialty:  Infectious diseases  Nephrology  Cardiology  General surgery      Review of Systems:    Review of Systems   Constitutional: Positive for malaise/fatigue. Negative for chills and fever.   HENT: Negative for hearing loss and sore throat.    Eyes: Negative for blurred vision and double vision.   Respiratory: Negative for cough, hemoptysis and shortness of breath.    Cardiovascular: Negative for chest pain and palpitations.   Gastrointestinal: Positive for abdominal pain and nausea. Negative for diarrhea and heartburn.    Genitourinary: Negative for dysuria and urgency.   Musculoskeletal: Positive for myalgias. Negative for neck pain.   Skin: Negative for itching and rash.   Neurological: Negative for dizziness and headaches.        Seeing yellow colored halos and squares   Endo/Heme/Allergies: Negative for polydipsia. Does not bruise/bleed easily.   Psychiatric/Behavioral: Negative for depression and suicidal ideas.       Objective Data:   Physical Exam:   Vitals:   Temp:  [35.9 °C (96.7 °F)-37.1 °C (98.8 °F)] 36.2 °C (97.2 °F)  Pulse:  [] 102  Resp:  [16-19] 16  BP: ()/(48-86) 141/83  SpO2:  [90 %-99 %] 99 %     Physical Exam  Constitutional:       Appearance: He is obese. He is ill-appearing.   HENT:      Head: Normocephalic and atraumatic.      Right Ear: Tympanic membrane normal.      Left Ear: Tympanic membrane normal.      Nose: Nose normal. No congestion.      Mouth/Throat:      Mouth: Mucous membranes are moist.      Pharynx: Oropharynx is clear. No oropharyngeal exudate.   Eyes:      General: No scleral icterus.     Extraocular Movements: Extraocular movements intact.      Pupils: Pupils are equal, round, and reactive to light.   Neck:      Musculoskeletal: Normal range of motion. No neck rigidity.   Cardiovascular:      Rate and Rhythm: Normal rate and regular rhythm.      Pulses: Normal pulses.      Heart sounds: Normal heart sounds.   Pulmonary:      Effort: No respiratory distress.      Breath sounds: Normal breath sounds.   Abdominal:      General: Bowel sounds are normal. There is distension.      Palpations: Abdomen is soft.      Tenderness: There is abdominal tenderness.      Comments: Colostomy+ clean wound   Musculoskeletal:      Right lower leg: Edema present.      Left lower leg: Edema present.   Skin:     General: Skin is warm.      Coloration: Skin is not jaundiced.   Neurological:      General: No focal deficit present.      Mental Status: He is alert and oriented to person, place, and time.       Cranial Nerves: No cranial nerve deficit.   Psychiatric:         Mood and Affect: Mood normal.         Behavior: Behavior normal.           Labs:   Recent Labs     11/16/20  0535 11/17/20  0444 11/18/20  0320   WBC 13.1* 19.1* 18.1*   RBC 4.39* 4.51* 4.22*   HEMOGLOBIN 11.0* 11.9* 10.9*   HEMATOCRIT 35.7* 38.0* 35.2*   MCV 81.3* 84.3 83.4   MCH 25.1* 26.4* 25.8*   RDW 53.4* 54.7* 53.3*   PLATELETCT 318 253 241   MPV 10.0 9.6 10.1   NEUTSPOLYS 67.60 73.10* 75.30*   LYMPHOCYTES 18.90* 13.60* 11.60*   MONOCYTES 8.70 6.60 6.40   EOSINOPHILS 1.60 4.70 4.50   BASOPHILS 0.40 0.20 0.20     Recent Labs     11/16/20  0535 11/17/20  0444 11/18/20  0320   SODIUM 139 138 139   POTASSIUM 3.8 3.4* 3.7   CHLORIDE 100 99 98   CO2 32 35* 35*   GLUCOSE 160* 120* 119*   BUN 31* 24* 19     Recent Labs     11/16/20  0535 11/17/20  0444 11/18/20  0320   ALBUMIN 2.7* 2.5* 2.4*   TBILIRUBIN 0.6 0.8 0.7   ALKPHOSPHAT 75 65 66   TOTPROTEIN 5.4* 4.7* 5.0*   ALTSGPT 9 9 7   ASTSGOT 23 16 14   CREATININE 1.33 1.01 0.76     Imaging:   CT-ABDOMEN-PELVIS W/O   Final Result      Postsurgical changes with a left abdominal stoma. Parastomal densities may be related to postsurgical hematoma measuring up to 2.8 x 2.1 cm.      Small amount of free fluid in the abdomen and pelvis.      Collection along the anterior spleen measures approximately 2.9 x 4.3 cm is decreased in size compared to the prior examination. Evaluation is limited by lack of intravenous contrast.      Third spacing.      Trace free intraperitoneal air is likely related to recent surgery.      Moderate left and small right pleural effusions with overlying atelectasis/consolidation.      Cortical scarring of the right kidney.      Cholelithiasis.         DX-CHEST-PORTABLE (1 VIEW)   Final Result      1.  Bilateral airspace and interstitial infiltrates.      2.  Cardiomegaly.      US-RENAL   Final Result    Examination was limited due to poor sound penetration related to body habitus.    Grossly normal appearance of the kidneys.      EC-ECHOCARDIOGRAM COMPLETE W/ CONT   Final Result      DX-CHEST-PORTABLE (1 VIEW)   Final Result      No pneumothorax identified following right subclavian line placement      Worsening atelectasis with consolidation at the bases not excluded      Stable cardiac silhouette enlargement      CT-ABDOMEN-PELVIS WITH   Final Result      Interval decrease in size of perisplenic abscess. Pigtail catheter now in place      Abscess has likely fistulous communication with the abutting splenic flexure of the colon. Colon is otherwise normal in appearance      Mild hepatosplenomegaly      Decreasing small left pleural effusion      Subacute anterior wedge compression fractures in the caudal thoracic spine      CT-DRAIN-PERITONEAL   Final Result      1.  CT guided perisplenic abscess catheter drainage.   2.  The current plan is to obtain a follow-up CT in 5-7 days..      OUTSIDE IMAGES-CT CHEST   Final Result      OUTSIDE IMAGES-CT ABDOMEN /PELVIS   Final Result      CT-ABDOMEN-PELVIS WITH   Final Result   Addendum 1 of 1   Addendum:   A prior noncontrast CT of the abdomen and pelvis from [facility] dated 10/19/2020 was made available for comparison. On the    prior study a perisplenic/subcapsular fluid collection was seen however    did not contain air at that time and    there was no discrete evidence of communication with the transverse colon.    Communication with splenic flexure of the colon and air within the    collection are new from the prior study.      Final      DX-CHEST-PORTABLE (1 VIEW)   Final Result      Slight interval interval improvement in congestive heart failure.          * Spontaneous perforation of colon (HCC)- (present on admission)  Assessment & Plan  Admitted with LUQ abscess and suspicion of perforation at splenic flexure  11/9 Drain with stool and unable to hold suction  11/10 Segmental colectomy, colostomy, Patti pouch, I & D  of abdominal wall abscess    - Fluconazole  - DC Zyvox due to prolonged QTc  - Switched to vancomycin (11/17)  - Started ceftriaxone and Flagyl 11/17      Leucocytosis  Assessment & Plan  Secondary to colon perforation  -Blood cultures on 11/31/2 + viridans streptococci, possible contaminant  -Blood culture on 11/5 with no growth to date   - wound culture showing enterococcus, and candida  - Fluconazole  - DC Zyvox due to prolonged QTc  - Switched to vancomycin (11/17)  - Started ceftriaxone and Flagyl 11/17    Sepsis ([facility])  Assessment & Plan  Secondary to perforation of colon,  - Fluconazole  - DC Zyvox due to prolonged QTc  - Switched to vancomycin (11/17)  - Started ceftriaxone and Flagyl 11/17    Elevated digoxin level  Assessment & Plan  H/o N/v and visual hallucinations including yellow halos, circles and squares  AFTER STARTING digoxin and 11/14 digoxin level 1.5 & on 11/17 Repeat digoxin level > 5,   & 0.8  Patient has N/V and hallucinations.  - MONITOR  - Dc DIGOXIN      SRUTHI (acute kidney injury) ([facility])  Assessment & Plan  improving    LV dysfunction- (present on admission)  Assessment & Plan  BP low after abscess drainage  11/11 Echocardiogram completed with normal left ventricular chamber size.  Moderately reduced left ventricular systolic function.  Left ventricular ejection fraction is visually estimated to be 30-40%  Cardiology recs: to continue Toprol-XL 50 mg qd,  lisinopril 10 mg qd, spironolactone 25 qd    Paroxysmal A-fib with RVR ([facility])- (present on admission)  Assessment & Plan  Seen by cardiac electrophysiologist in 2019 and deemed not to be good candidate for anticoagulation due to frequent falls 2/2 orthostatic hypotension  CHADSVASC of 3 To start on metoprolol XL 50 mg dailystarted digoxin and 11/14 digoxin level 1.5 & on 11/17 Repeat digoxin level > 5,   -Increase metoprolol to 50 mg twice daily on 11/18      Schizophrenia ([facility])  Assessment & Plan  Patient with H/O schizophrenia on Olanzapine  and sertraline.  - continue  - avoid other QTc prolonging meds.    Type 2 diabetes mellitus, without long-term current use of insulin, with peripheral neuropathy (HCC)- (present on admission)  Assessment & Plan  Patient states blood sugar well controlled at home with metformin. Last a1c of 7.8 on 11/10/20   POC glucose 100s - 140s.  - Sliding scale insulin with hypoglycemia protocol  - Ensure strict glucose control for proper infection control, <150 requested per ID

## 2020-11-19 PROBLEM — R94.31 PROLONGED QT INTERVAL: Status: ACTIVE | Noted: 2020-11-19

## 2020-11-19 LAB
ALBUMIN SERPL BCP-MCNC: 2.4 G/DL (ref 3.2–4.9)
ALBUMIN/GLOB SERPL: 0.9 G/DL
ALP SERPL-CCNC: 68 U/L (ref 30–99)
ALT SERPL-CCNC: 8 U/L (ref 2–50)
ANION GAP SERPL CALC-SCNC: 3 MMOL/L (ref 7–16)
ANISOCYTOSIS BLD QL SMEAR: ABNORMAL
AST SERPL-CCNC: 15 U/L (ref 12–45)
BASOPHILS # BLD AUTO: 0.3 % (ref 0–1.8)
BASOPHILS # BLD: 0.04 K/UL (ref 0–0.12)
BILIRUB SERPL-MCNC: 0.6 MG/DL (ref 0.1–1.5)
BUN SERPL-MCNC: 17 MG/DL (ref 8–22)
CALCIUM SERPL-MCNC: 7.5 MG/DL (ref 8.5–10.5)
CHLORIDE SERPL-SCNC: 99 MMOL/L (ref 96–112)
CO2 SERPL-SCNC: 36 MMOL/L (ref 20–33)
COMMENT 1642: NORMAL
CREAT SERPL-MCNC: 0.66 MG/DL (ref 0.5–1.4)
EOSINOPHIL # BLD AUTO: 0.71 K/UL (ref 0–0.51)
EOSINOPHIL NFR BLD: 4.6 % (ref 0–6.9)
ERYTHROCYTE [DISTWIDTH] IN BLOOD BY AUTOMATED COUNT: 54.1 FL (ref 35.9–50)
GLOBULIN SER CALC-MCNC: 2.7 G/DL (ref 1.9–3.5)
GLUCOSE BLD-MCNC: 104 MG/DL (ref 65–99)
GLUCOSE BLD-MCNC: 110 MG/DL (ref 65–99)
GLUCOSE BLD-MCNC: 112 MG/DL (ref 65–99)
GLUCOSE BLD-MCNC: 120 MG/DL (ref 65–99)
GLUCOSE SERPL-MCNC: 105 MG/DL (ref 65–99)
HCT VFR BLD AUTO: 35.1 % (ref 42–52)
HGB BLD-MCNC: 10.4 G/DL (ref 14–18)
IMM GRANULOCYTES # BLD AUTO: 0.33 K/UL (ref 0–0.11)
IMM GRANULOCYTES NFR BLD AUTO: 2.1 % (ref 0–0.9)
LYMPHOCYTES # BLD AUTO: 2.35 K/UL (ref 1–4.8)
LYMPHOCYTES NFR BLD: 15.2 % (ref 22–41)
MCH RBC QN AUTO: 24.9 PG (ref 27–33)
MCHC RBC AUTO-ENTMCNC: 29.6 G/DL (ref 33.7–35.3)
MCV RBC AUTO: 84 FL (ref 81.4–97.8)
MONOCYTES # BLD AUTO: 1.18 K/UL (ref 0–0.85)
MONOCYTES NFR BLD AUTO: 7.7 % (ref 0–13.4)
MORPHOLOGY BLD-IMP: NORMAL
NEUTROPHILS # BLD AUTO: 10.81 K/UL (ref 1.82–7.42)
NEUTROPHILS NFR BLD: 70.1 % (ref 44–72)
NRBC # BLD AUTO: 0.04 K/UL
NRBC BLD-RTO: 0.3 /100 WBC
PLATELET # BLD AUTO: 214 K/UL (ref 164–446)
PLATELET BLD QL SMEAR: NORMAL
PMV BLD AUTO: 10 FL (ref 9–12.9)
POTASSIUM SERPL-SCNC: 3.8 MMOL/L (ref 3.6–5.5)
PROT SERPL-MCNC: 5.1 G/DL (ref 6–8.2)
RBC # BLD AUTO: 4.18 M/UL (ref 4.7–6.1)
RBC BLD AUTO: PRESENT
SODIUM SERPL-SCNC: 138 MMOL/L (ref 135–145)
VANCOMYCIN TROUGH SERPL-MCNC: 18.4 UG/ML (ref 10–20)
WBC # BLD AUTO: 15.4 K/UL (ref 4.8–10.8)

## 2020-11-19 PROCEDURE — 700105 HCHG RX REV CODE 258: Performed by: HOSPITALIST

## 2020-11-19 PROCEDURE — 700102 HCHG RX REV CODE 250 W/ 637 OVERRIDE(OP): Performed by: INTERNAL MEDICINE

## 2020-11-19 PROCEDURE — A9270 NON-COVERED ITEM OR SERVICE: HCPCS | Performed by: INTERNAL MEDICINE

## 2020-11-19 PROCEDURE — 80053 COMPREHEN METABOLIC PANEL: CPT

## 2020-11-19 PROCEDURE — 700111 HCHG RX REV CODE 636 W/ 250 OVERRIDE (IP): Performed by: INTERNAL MEDICINE

## 2020-11-19 PROCEDURE — 85025 COMPLETE CBC W/AUTO DIFF WBC: CPT

## 2020-11-19 PROCEDURE — 700111 HCHG RX REV CODE 636 W/ 250 OVERRIDE (IP): Performed by: HOSPITALIST

## 2020-11-19 PROCEDURE — 82962 GLUCOSE BLOOD TEST: CPT | Mod: 91

## 2020-11-19 PROCEDURE — 700111 HCHG RX REV CODE 636 W/ 250 OVERRIDE (IP): Performed by: NURSE PRACTITIONER

## 2020-11-19 PROCEDURE — 99232 SBSQ HOSP IP/OBS MODERATE 35: CPT | Mod: GC | Performed by: HOSPITALIST

## 2020-11-19 PROCEDURE — 700105 HCHG RX REV CODE 258: Performed by: INTERNAL MEDICINE

## 2020-11-19 PROCEDURE — 700102 HCHG RX REV CODE 250 W/ 637 OVERRIDE(OP): Performed by: HOSPITALIST

## 2020-11-19 PROCEDURE — A9270 NON-COVERED ITEM OR SERVICE: HCPCS | Performed by: STUDENT IN AN ORGANIZED HEALTH CARE EDUCATION/TRAINING PROGRAM

## 2020-11-19 PROCEDURE — 700102 HCHG RX REV CODE 250 W/ 637 OVERRIDE(OP): Performed by: STUDENT IN AN ORGANIZED HEALTH CARE EDUCATION/TRAINING PROGRAM

## 2020-11-19 PROCEDURE — 770020 HCHG ROOM/CARE - TELE (206)

## 2020-11-19 PROCEDURE — 80202 ASSAY OF VANCOMYCIN: CPT

## 2020-11-19 PROCEDURE — A9270 NON-COVERED ITEM OR SERVICE: HCPCS | Performed by: HOSPITALIST

## 2020-11-19 PROCEDURE — 700111 HCHG RX REV CODE 636 W/ 250 OVERRIDE (IP): Performed by: STUDENT IN AN ORGANIZED HEALTH CARE EDUCATION/TRAINING PROGRAM

## 2020-11-19 PROCEDURE — 700111 HCHG RX REV CODE 636 W/ 250 OVERRIDE (IP): Performed by: SURGERY

## 2020-11-19 RX ORDER — OLANZAPINE 5 MG/1
7.5 TABLET ORAL
Status: DISCONTINUED | OUTPATIENT
Start: 2020-11-19 | End: 2020-11-20

## 2020-11-19 RX ADMIN — METRONIDAZOLE 500 MG: 500 TABLET ORAL at 22:39

## 2020-11-19 RX ADMIN — PROCHLORPERAZINE EDISYLATE 5 MG: 5 INJECTION INTRAMUSCULAR; INTRAVENOUS at 22:51

## 2020-11-19 RX ADMIN — VANCOMYCIN HYDROCHLORIDE 2000 MG: 500 INJECTION, POWDER, LYOPHILIZED, FOR SOLUTION INTRAVENOUS at 06:36

## 2020-11-19 RX ADMIN — FENTANYL CITRATE 50 MCG: 50 INJECTION, SOLUTION INTRAMUSCULAR; INTRAVENOUS at 06:36

## 2020-11-19 RX ADMIN — SPIRONOLACTONE 25 MG: 50 TABLET ORAL at 06:43

## 2020-11-19 RX ADMIN — METOPROLOL SUCCINATE 50 MG: 50 TABLET, EXTENDED RELEASE ORAL at 06:43

## 2020-11-19 RX ADMIN — METRONIDAZOLE 500 MG: 500 TABLET ORAL at 06:43

## 2020-11-19 RX ADMIN — OLANZAPINE 7.5 MG: 5 TABLET, FILM COATED ORAL at 22:39

## 2020-11-19 RX ADMIN — FENTANYL CITRATE 50 MCG: 50 INJECTION, SOLUTION INTRAMUSCULAR; INTRAVENOUS at 22:38

## 2020-11-19 RX ADMIN — PROCHLORPERAZINE EDISYLATE 5 MG: 5 INJECTION INTRAMUSCULAR; INTRAVENOUS at 08:18

## 2020-11-19 RX ADMIN — FLUCONAZOLE 400 MG: 200 TABLET ORAL at 06:43

## 2020-11-19 RX ADMIN — HEPARIN SODIUM 5000 UNITS: 5000 INJECTION, SOLUTION INTRAVENOUS; SUBCUTANEOUS at 06:41

## 2020-11-19 RX ADMIN — CEFTRIAXONE SODIUM 2 G: 2 INJECTION, POWDER, FOR SOLUTION INTRAMUSCULAR; INTRAVENOUS at 06:37

## 2020-11-19 RX ADMIN — FENTANYL CITRATE 50 MCG: 50 INJECTION, SOLUTION INTRAMUSCULAR; INTRAVENOUS at 00:38

## 2020-11-19 RX ADMIN — ENOXAPARIN SODIUM 40 MG: 40 INJECTION SUBCUTANEOUS at 14:04

## 2020-11-19 RX ADMIN — INSULIN GLARGINE 10 UNITS: 100 INJECTION, SOLUTION SUBCUTANEOUS at 06:47

## 2020-11-19 RX ADMIN — METRONIDAZOLE 500 MG: 500 TABLET ORAL at 14:03

## 2020-11-19 RX ADMIN — PROCHLORPERAZINE EDISYLATE 5 MG: 5 INJECTION INTRAMUSCULAR; INTRAVENOUS at 00:39

## 2020-11-19 RX ADMIN — METOPROLOL SUCCINATE 50 MG: 50 TABLET, EXTENDED RELEASE ORAL at 16:39

## 2020-11-19 RX ADMIN — FENTANYL CITRATE 50 MCG: 50 INJECTION, SOLUTION INTRAMUSCULAR; INTRAVENOUS at 14:03

## 2020-11-19 RX ADMIN — FAMOTIDINE 20 MG: 20 TABLET, FILM COATED ORAL at 06:43

## 2020-11-19 RX ADMIN — CETIRIZINE HYDROCHLORIDE 10 MG: 10 TABLET, FILM COATED ORAL at 06:43

## 2020-11-19 RX ADMIN — LISINOPRIL 10 MG: 10 TABLET ORAL at 06:42

## 2020-11-19 RX ADMIN — SERTRALINE HYDROCHLORIDE 75 MG: 50 TABLET ORAL at 06:43

## 2020-11-19 ASSESSMENT — ENCOUNTER SYMPTOMS
HEARTBURN: 0
VOMITING: 0
PALPITATIONS: 0
POLYDIPSIA: 0
DOUBLE VISION: 1
DEPRESSION: 1
WEAKNESS: 1
SHORTNESS OF BREATH: 0
FEVER: 0
SORE THROAT: 0
BRUISES/BLEEDS EASILY: 0
BLURRED VISION: 0
NAUSEA: 1
COUGH: 0
BLURRED VISION: 1
HALLUCINATIONS: 1
VOMITING: 1
MYALGIAS: 0
CHILLS: 0
HEADACHES: 0
ORTHOPNEA: 0
ABDOMINAL PAIN: 0
DIAPHORESIS: 0
NERVOUS/ANXIOUS: 1
DOUBLE VISION: 0
DEPRESSION: 0
CHILLS: 1
DIZZINESS: 0

## 2020-11-19 ASSESSMENT — PAIN DESCRIPTION - PAIN TYPE
TYPE: ACUTE PAIN
TYPE: ACUTE PAIN

## 2020-11-19 NOTE — PROGRESS NOTES
Assumed care at 0700, bedside report received from Catie PÉREZ. Pt. Is Afib on the monitor. Initial assessment completed, orders reviewed, call light within reach, bed alarm is in use, and hourly rounding in place. POC addressed with patient, no additional questions at this time.

## 2020-11-19 NOTE — PROGRESS NOTES
Daily Progress Note:     Date of Service: 11/19/2020  Primary Team: [facility] Yellow Team   Attending: JONATHAN Myrick M.D.   Senior Resident: Dr. Peace    Contact:  [phone]    ID: 55-year-old male with past medical history of obesity BMI of 53,  type 2 diabetes mellitus,  atrial fibrillation, congestive heart failure EF of 30% 11/2020 schizophrenia with depression had a history of ground-level fall 6 weeks ago CT abdomen showed subcapsular splenic hematoma seen and discharged on oral antibiotic.  Patient presented on 11/3 with history of increasing abdominal pain, CT abdomen at that time showed intra-abdominal abscess admitted for surgery and got surgery done showed perforated splenic flexure and received colectomy on 11/10 with ostomy placement.  Patient had A. fib with RVR started on digoxin by cardiology on 11/14 levels checked 11/14 1.5 and 11/16 >5.  Transfer to telemetry for further management.  Did not receive antibody to digoxin but repeat digoxin level is 0.8.    Subjective:No acute overnight vents but patient still feel nausea, and have some hallucinations. Denies any fever, abdominal pain,.    Consultants/Specialty:  Infectious diseases  Nephrology  Cardiology  General surgery    Review of Systems:   Review of Systems   Constitutional: Positive for malaise/fatigue. Negative for chills and fever.   HENT: Negative for hearing loss, sore throat and tinnitus.    Eyes: Negative for blurred vision and double vision.   Respiratory: Negative for cough and shortness of breath.    Cardiovascular: Negative for chest pain and palpitations.   Gastrointestinal: Positive for nausea. Negative for abdominal pain, heartburn and vomiting.   Genitourinary: Negative for dysuria and urgency.   Musculoskeletal: Negative for myalgias.   Skin: Negative for itching and rash.   Neurological: Positive for weakness. Negative for dizziness and headaches.   Endo/Heme/Allergies: Negative for polydipsia. Does not bruise/bleed  easily.   Psychiatric/Behavioral: Positive for hallucinations. Negative for depression.       Objective Data:   Physical Exam:   Vitals:   Temp:  [36.5 °C (97.7 °F)-37.1 °C (98.7 °F)] 36.6 °C (97.9 °F)  Pulse:  [87-93] 93  Resp:  [16] 16  BP: (136-158)/(76-94) 158/94  SpO2:  [90 %-99 %] 97 %     Physical Exam  Constitutional:       General: He is not in acute distress.     Appearance: He is obese. He is ill-appearing.   HENT:      Head: Normocephalic and atraumatic.      Right Ear: Tympanic membrane normal.      Nose: No congestion.      Mouth/Throat:      Mouth: Mucous membranes are moist.      Pharynx: No oropharyngeal exudate.   Eyes:      General: No scleral icterus.     Comments:  R sided strabismus (pre-existing according to patient)   Neck:      Musculoskeletal: Normal range of motion. No neck rigidity.   Cardiovascular:      Rate and Rhythm: Normal rate. Rhythm irregular.      Heart sounds: No murmur.      Comments: AF with HR 80-85  Pulmonary:      Effort: No respiratory distress.      Breath sounds: Normal breath sounds. No wheezing.   Abdominal:      General: Bowel sounds are normal. There is no distension.      Palpations: Abdomen is soft.      Tenderness: There is no abdominal tenderness.      Comments: Colostomy + clean.   Musculoskeletal:      Left lower leg: Edema present.   Skin:     General: Skin is warm.      Coloration: Skin is not jaundiced.   Neurological:      Mental Status: He is alert and oriented to person, place, and time. Mental status is at baseline.   Psychiatric:         Mood and Affect: Mood normal.         Behavior: Behavior normal.      Comments: Having hallucinations+           Labs:   Recent Labs     11/17/20  0444 11/18/20  0320 11/19/20  0315   WBC 19.1* 18.1* 15.4*   RBC 4.51* 4.22* 4.18*   HEMOGLOBIN 11.9* 10.9* 10.4*   HEMATOCRIT 38.0* 35.2* 35.1*   MCV 84.3 83.4 84.0   MCH 26.4* 25.8* 24.9*   RDW 54.7* 53.3* 54.1*   PLATELETCT 253 241 214   MPV 9.6 10.1 10.0   NEUTSPOLYS  73.10* 75.30* 70.10   LYMPHOCYTES 13.60* 11.60* 15.20*   MONOCYTES 6.60 6.40 7.70   EOSINOPHILS 4.70 4.50 4.60   BASOPHILS 0.20 0.20 0.30   RBCMORPHOLO  --   --  Present     Recent Labs     11/17/20  0444 11/18/20  0320 11/19/20  0315   SODIUM 138 139 138   POTASSIUM 3.4* 3.7 3.8   CHLORIDE 99 98 99   CO2 35* 35* 36*   GLUCOSE 120* 119* 105*   BUN 24* 19 17     Recent Labs     11/17/20  0444 11/18/20  0320 11/19/20  0315   ALBUMIN 2.5* 2.4* 2.4*   TBILIRUBIN 0.8 0.7 0.6   ALKPHOSPHAT 65 66 68   TOTPROTEIN 4.7* 5.0* 5.1*   ALTSGPT 9 7 8   ASTSGOT 16 14 15   CREATININE 1.01 0.76 0.66       Imaging:   CT-ABDOMEN-PELVIS W/O   Final Result      Postsurgical changes with a left abdominal stoma. Parastomal densities may be related to postsurgical hematoma measuring up to 2.8 x 2.1 cm.      Small amount of free fluid in the abdomen and pelvis.      Collection along the anterior spleen measures approximately 2.9 x 4.3 cm is decreased in size compared to the prior examination. Evaluation is limited by lack of intravenous contrast.      Third spacing.      Trace free intraperitoneal air is likely related to recent surgery.      Moderate left and small right pleural effusions with overlying atelectasis/consolidation.      Cortical scarring of the right kidney.      Cholelithiasis.         DX-CHEST-PORTABLE (1 VIEW)   Final Result      1.  Bilateral airspace and interstitial infiltrates.      2.  Cardiomegaly.      US-RENAL   Final Result    Examination was limited due to poor sound penetration related to body habitus.   Grossly normal appearance of the kidneys.      EC-ECHOCARDIOGRAM COMPLETE W/ CONT   Final Result      DX-CHEST-PORTABLE (1 VIEW)   Final Result      No pneumothorax identified following right subclavian line placement      Worsening atelectasis with consolidation at the bases not excluded      Stable cardiac silhouette enlargement      CT-ABDOMEN-PELVIS WITH   Final Result      Interval decrease in size of  perisplenic abscess. Pigtail catheter now in place      Abscess has likely fistulous communication with the abutting splenic flexure of the colon. Colon is otherwise normal in appearance      Mild hepatosplenomegaly      Decreasing small left pleural effusion      Subacute anterior wedge compression fractures in the caudal thoracic spine      CT-DRAIN-PERITONEAL   Final Result      1.  CT guided perisplenic abscess catheter drainage.   2.  The current plan is to obtain a follow-up CT in 5-7 days..      OUTSIDE IMAGES-CT CHEST   Final Result      OUTSIDE IMAGES-CT ABDOMEN /PELVIS   Final Result      CT-ABDOMEN-PELVIS WITH   Final Result   Addendum 1 of 1   Addendum:   A prior noncontrast CT of the abdomen and pelvis from [facility] dated 10/19/2020 was made available for comparison. On the    prior study a perisplenic/subcapsular fluid collection was seen however    did not contain air at that time and    there was no discrete evidence of communication with the transverse colon.    Communication with splenic flexure of the colon and air within the    collection are new from the prior study.      Final      DX-CHEST-PORTABLE (1 VIEW)   Final Result      Slight interval interval improvement in congestive heart failure.          * Spontaneous perforation of colon (HCC)- (present on admission)  Assessment & Plan  Admitted with LUQ abscess and perforation at splenic flexure   Segmental colectomy, colostomy, Patti pouch, I & D of abdominal wall abscess  Wound cultures: enterococcus and candida  - DCed Zyvox due to prolonged QTc  - continue Fluconazole(11/14)  - continue vancomycin (11/17)  - continue ceftriaxone and Flagyl 11/17      Leucocytosis  Assessment & Plan  Improving  Secondary to colon perforation  -Blood cultures on 11/31/2 + viridans streptococci, possible contaminant  -Blood culture on 11/5 with no growth to date   - wound culture showing enterococcus, and candida  - Fluconazole  - DC  Zyvox due to prolonged QTc  - continue vancomycin (11/17)  - continue ceftriaxone and Flagyl 11/17    Sepsis ([facility])  Assessment & Plan  Secondary to perforation of colon,  Wound cultures: enterococcus & candida.  Started on Linezolid and Fluconazole. But his QTc is prolonged.  DCed linezolide and started on vancomycin.  - continue Fluconazole  - DC Zyvox due to prolonged QTc  - continue  vancomycin (11/17)  - continue ceftriaxone and Flagyl 11/17    Elevated digoxin level  Assessment & Plan  H/o N/v and visual hallucinations including yellow halos, circles and squares  AFTER STARTING digoxin and 11/14 digoxin level 1.5 & on 11/17 Repeat digoxin level > 5,   & 0.8  Patient has N/V and hallucinations.  - MONITOR  - Dced DIGOXIN      SRUTHI (acute kidney injury) ([facility])  Assessment & Plan  improving    LV dysfunction- (present on admission)  Assessment & Plan  BP low after abscess drainage  11/11 Echocardiogram completed with normal left ventricular chamber size.  Moderately reduced left ventricular systolic function.  Left ventricular ejection fraction is visually estimated to be 30-40%  to continue Toprol-XL 50 mg Bd,  lisinopril 10 mg qd, spironolactone 25 qd    Paroxysmal A-fib with RVR ([facility])- (present on admission)  Assessment & Plan  Seen by cardiac electrophysiologist in 2019 and deemed not to be good candidate for anticoagulation due to frequent falls 2/2 orthostatic hypotension  CHADSVASC of 3 started digoxin by cardiology and on 11/14 checked digoxin level 1.5 & on 11/17 Repeat digoxin level > 5,transferred to tele floor for monitoring, planned to give digoxin Ab but repeat level decreased to 0.8.  Started on metoprolol 50 mg daily, increased dose to BD & monitored on tele.  Rate is controlled.  - to continue  metoprolol to 50 mg twice daily      Prolonged QT interval  Assessment & Plan  Patient's QTc on admission 477 increased to 535 with Linezolid, zofran, zoloft, olanzapine.  DCed Linezolid, and  zofran  Decreased dose of olanzapine & zoloft  QTc improved from 535 to 425   Patient having symptoms of schizophrenia  - avoid QT prolonging medication.    Schizophrenia ([facility])  Assessment & Plan  Patient with H/O schizophrenia on Olanzapine and sertraline.  - continue  - avoid other QTc prolonging meds.    Type 2 diabetes mellitus, without long-term current use of insulin, with peripheral neuropathy ([facility])- (present on admission)  Assessment & Plan  Patient states blood sugar well controlled at home with metformin. Last a1c of 7.8 on 11/10/20   - continue Lantus 10U daily  - Sliding scale insulin with hypoglycemia protocol  - Ensure strict glucose control

## 2020-11-19 NOTE — NON-PROVIDER
Daily Progress Note:     Date of Service: 11/19/2020  Primary Team: [facility]  Red Team  and [facility]  Yellow Team   Attending: JONATHAN Myrick M.D.   Senior Resident: Dr. ePace  Intern: Layo Harp, Student  Contact:  [phone]    Chief Complaint:   Intraabdominal abscess with perforated splenic flexure of the colon    ID:  Patient is a 55 y.o. male who underwent a colectomy and ostomy placement for an intraabdominal abscess with perforated splenic flexure with a PMH of afib, DM2, hyperlipidemia, schizophrenia, depression, and CHF.      Interval Update:   Patient feels better this morning and continues to have improving WBC now at 15.4.  His blood glucose continues to be under control in the low 100s.  He reports N/V, but less today.  He also reports some blurry vision/double vision, chills and lightheadedness.  His schizophrenia symptoms of audio hallucinations have returned as well lately.  He is still mildly depressed and anxious, but not suicidal.     Consultants/Specialty:  ID  Surgery  Cardio  Nephro    Review of Systems:    Review of Systems   Constitutional: Positive for chills. Negative for diaphoresis and malaise/fatigue.   HENT: Negative for congestion, hearing loss and sore throat.    Eyes: Positive for blurred vision and double vision.   Respiratory: Negative for cough and shortness of breath.    Cardiovascular: Negative for orthopnea.   Gastrointestinal: Positive for nausea and vomiting.   Musculoskeletal: Negative for myalgias.   Neurological: Negative for dizziness and headaches.   Psychiatric/Behavioral: Positive for depression and hallucinations (audio). Negative for suicidal ideas. The patient is nervous/anxious.        Objective:   Physical Exam:   Vitals:   Temp:  [36.5 °C (97.7 °F)-37.1 °C (98.7 °F)] 36.6 °C (97.9 °F)  Pulse:  [87-93] 93  Resp:  [16] 16  BP: (136-158)/(76-94) 158/94  SpO2:  [90 %-99 %] 97 %    Physical Exam  Constitutional:       Appearance: He is obese.   HENT:       Head: Normocephalic and atraumatic.      Right Ear: External ear normal.      Left Ear: External ear normal.      Nose: Nose normal.      Mouth/Throat:      Mouth: Mucous membranes are moist.   Eyes:      Pupils: Pupils are equal, round, and reactive to light.      Comments: R sided strabismus (pre-existing according to patient)     Cardiovascular:      Rate and Rhythm: Normal rate and regular rhythm.      Heart sounds: Normal heart sounds.   Pulmonary:      Effort: Pulmonary effort is normal.      Breath sounds: Normal breath sounds.   Abdominal:      General: Abdomen is flat. There is no distension.   Skin:     General: Skin is warm and dry.      Capillary Refill: Capillary refill takes less than 2 seconds.   Neurological:      General: No focal deficit present.      Mental Status: He is alert.   Psychiatric:         Behavior: Behavior normal.         Thought Content: Thought content normal.      Comments: Patient reports feeling of depression, anxiety           Labs:   Recent Labs     11/17/20 0444 11/18/20 0320 11/19/20 0315   WBC 19.1* 18.1* 15.4*   RBC 4.51* 4.22* 4.18*   HEMOGLOBIN 11.9* 10.9* 10.4*   HEMATOCRIT 38.0* 35.2* 35.1*   MCV 84.3 83.4 84.0   MCH 26.4* 25.8* 24.9*   RDW 54.7* 53.3* 54.1*   PLATELETCT 253 241 214   MPV 9.6 10.1 10.0   NEUTSPOLYS 73.10* 75.30* 70.10   LYMPHOCYTES 13.60* 11.60* 15.20*   MONOCYTES 6.60 6.40 7.70   EOSINOPHILS 4.70 4.50 4.60   BASOPHILS 0.20 0.20 0.30   RBCMORPHOLO  --   --  Present     Recent Labs     11/17/20 0444 11/18/20 0320 11/19/20  0315   SODIUM 138 139 138   POTASSIUM 3.4* 3.7 3.8   CHLORIDE 99 98 99   CO2 35* 35* 36*   GLUCOSE 120* 119* 105*   BUN 24* 19 17     Recent Labs     11/17/20 0444 11/18/20 0320 11/19/20  0315   ALBUMIN 2.5* 2.4* 2.4*   TBILIRUBIN 0.8 0.7 0.6   ALKPHOSPHAT 65 66 68   TOTPROTEIN 4.7* 5.0* 5.1*   ALTSGPT 9 7 8   ASTSGOT 16 14 15   CREATININE 1.01 0.76 0.66       Imaging:   CT-ABDOMEN-PELVIS W/O   Final Result      Postsurgical  changes with a left abdominal stoma. Parastomal densities may be related to postsurgical hematoma measuring up to 2.8 x 2.1 cm.      Small amount of free fluid in the abdomen and pelvis.      Collection along the anterior spleen measures approximately 2.9 x 4.3 cm is decreased in size compared to the prior examination. Evaluation is limited by lack of intravenous contrast.      Third spacing.      Trace free intraperitoneal air is likely related to recent surgery.      Moderate left and small right pleural effusions with overlying atelectasis/consolidation.      Cortical scarring of the right kidney.      Cholelithiasis.         DX-CHEST-PORTABLE (1 VIEW)   Final Result      1.  Bilateral airspace and interstitial infiltrates.      2.  Cardiomegaly.      US-RENAL   Final Result    Examination was limited due to poor sound penetration related to body habitus.   Grossly normal appearance of the kidneys.      EC-ECHOCARDIOGRAM COMPLETE W/ CONT   Final Result      DX-CHEST-PORTABLE (1 VIEW)   Final Result      No pneumothorax identified following right subclavian line placement      Worsening atelectasis with consolidation at the bases not excluded      Stable cardiac silhouette enlargement      CT-ABDOMEN-PELVIS WITH   Final Result      Interval decrease in size of perisplenic abscess. Pigtail catheter now in place      Abscess has likely fistulous communication with the abutting splenic flexure of the colon. Colon is otherwise normal in appearance      Mild hepatosplenomegaly      Decreasing small left pleural effusion      Subacute anterior wedge compression fractures in the caudal thoracic spine      CT-DRAIN-PERITONEAL   Final Result      1.  CT guided perisplenic abscess catheter drainage.   2.  The current plan is to obtain a follow-up CT in 5-7 days..      OUTSIDE IMAGES-CT CHEST   Final Result      OUTSIDE IMAGES-CT ABDOMEN /PELVIS   Final Result      CT-ABDOMEN-PELVIS WITH   Final Result   Addendum 1 of 1    Addendum:   A prior noncontrast CT of the abdomen and pelvis from [facility] dated 10/19/2020 was made available for comparison. On the    prior study a perisplenic/subcapsular fluid collection was seen however    did not contain air at that time and    there was no discrete evidence of communication with the transverse colon.    Communication with splenic flexure of the colon and air within the    collection are new from the prior study.      Final      DX-CHEST-PORTABLE (1 VIEW)   Final Result      Slight interval interval improvement in congestive heart failure.          Assessment and Plan:  #Post op for perforation of colon with colectomy and ostomy  Patient has improving symptoms and has decreased from a WBC of 18.1 to 15.4 continuing to downtrend.  Increase in WBC may be due to ongoing infection vs stress. Patient has been afebrile for the last 48 hours.  Enterococcus faecalis and candida albicans grew from surgical culture.     -IV ceftriaxone and metronidazole and Fluconazole continued  -Repeat CBC next AM     #Schizophrenia  Patient has a PMH of schizophrenia before admission for which he takes olanzapine.  QTC prolongation indicated lowering of his olanzipine dose.  Patient has had increased auditory hallucinations lately that degrade him and negatively impact his hospital stay by lowering his self esteem.    -Increase olanzipine dose with caution for QTC prolongation    #A-fib with RVR  Patient was not a candidate for anticoagulation due to frequent falls from orthostatic hypotension.  Digoxin was started but is no longer to be used to due to ongoing toxicity. Patient was tachycardic on 11/18 morning leading to increase of metoprolol dose from 50 per day to 50 BID.  Patient's HR in the 80s-90s and appears to be controlled.     -Continue on tele for observation      #Left Ventricular heart failure  Patient has left ventricular ejection fraction estimated to be 35% with normal  size. Patient sustained SRUTHI leading to discontinuation of lisinopril and spironolactone, but now is back on these medications.     -Patient currently taking metoprolol, lisinopril, spirolactone      #Type 2 DM  Patient's blood glucose is well controlled with ranges under 150 per ID, blood glucose has been controlled under in the low 100s.     -Continue current sliding scale insulin regimen  -BG checks every 6 hours     #QTC prolongation  Patient was found to have a QTC prolongation of 535 on 11/17.  Cause may be due to multiple medications including sertraline, olanzapine, fluconazole, linezolid and zofran.  Risk of Torsades. Now patient has QTC of 424 on 11/18 which is back to baseline. Zofran was d/c, linezolid switched to vancomycin, sertraline and olanzapine doses were lowered. Problem was resolved.     -olanzapine dose increased due to return of schizophrenic symptoms, patient's QTC should be observed for return     #Digoxin Toxicity   Patient was started on digoxin for CHF with normal levels of 1.5 on 11/14.  Patient began to have worsening nausea and developed vomiting and hallucinating yellow shapes beginning 11/16.  He was found to have a digoxin level >5 and digoxin was discontinued.  His levels lowered to 0.8 (11/17) .  The increase in levels may have been due to concurrent usage of fluconazole a [id number] inhibitor. Patient no longer is having hallucinations related to digoxin toxicity.     -Patient's digoxin toxicity resolved

## 2020-11-19 NOTE — WOUND TEAM
[facility]  Inpatient Services  Wound and Skin Care Evaluation    Admission Date: 11/3/2020     Last order of IP CONSULT TO WOUND CARE was found on 11/16/2020 from Hospital Encounter on 11/3/2020     HPI, PMH, SH: Reviewed    Unit where seen by Wound Team: [id number]     WOUND CONSULT/FOLLOW UP RELATED TO:   Left flank/Abdominal NPWT dressing/Ostomy appliance change    Self Report / Pain Level:  Some tenderness, premedicated with IV. Tolerated well.        OBJECTIVE:  NPWT intact. Left flank dressing in place. Colostomy intact.     WOUND TYPE, LOCATION, CHARACTERISTICS (Pressure Injuries: location, stage, POA or date identified)          Negative Pressure Wound Therapy 11/10/20 Surgical Abdomen Midline (Active)   Vacuum Serial Number [id number]    NPWT Pump Mode / Pressure Setting 125 mmHg    Dressing Type Medium;Black Foam (Regular)    Number of Foam Pieces Used 2    Canister Changed No    Output (mL) 100 mL    NEXT Dressing Change/Treatment Date 11/20/20    WOUND NURSE ONLY - Time Spent with Patient (mins) 60            Wound 11/10/20 Full Thickness Wound Abdomen Midline wound vac (Active)   Wound Image   11/18/20 1100   Site Assessment Red;Pink;Yellow;Painful 11/18/20 1100   Periwound Assessment Clean;Dry;Intact 11/18/20 1100   Margins Attached edges;Defined edges 11/18/20 1100   Closure Secondary intention 11/18/20 1100   Drainage Amount Small 11/18/20 1100   Drainage Description Serosanguineous 11/18/20 1100   Treatments Cleansed;Site care;Tape change 11/18/20 1100   Wound Cleansing Approved Wound Cleanser 11/18/20 1100   Periwound Protectant Benzoin;Drape;Mepitel 11/18/20 1100   Dressing Cleansing/Solutions Not Applicable 11/18/20 1100   Dressing Options Wound Vac 11/18/20 1100   Dressing Changed Changed 11/18/20 1100   Dressing Status Clean;Dry;Intact 11/18/20 1100   Dressing Change/Treatment Frequency Monday, Wednesday, Friday, and As Needed 11/18/20 1100   NEXT Dressing Change/Treatment Date  11/20/20 11/18/20 1100   NEXT Weekly Photo (Inpatient Only) 11/25/20 11/18/20 1100   Number of Staples Removed 10 11/12/20 1015   Non-staged Wound Description Full thickness 11/18/20 1100   Wound Length (cm) 18.3 cm 11/18/20 1100   Wound Width (cm) 9.2 cm 11/18/20 1100   Wound Depth (cm) 6.2 cm 11/18/20 1100   Wound Surface Area (cm^2) 168.36 cm^2 11/18/20 1100   Wound Volume (cm^3) 1043.83 cm^3 11/18/20 1100   Wound Healing % -38 11/18/20 1100   Exposed Structures Adipose 11/18/20 1100   WOUND NURSE ONLY - Time Spent with Patient (mins) 75 11/16/20 1400       Wound 11/10/20 Full Thickness Wound Flank Left (Active)   Wound Image   11/18/20 1100   Site Assessment Pink;Red 11/18/20 1100   Periwound Assessment Clean;Dry;Intact 11/18/20 1100   Margins Attached edges;Defined edges 11/18/20 1100   Closure Secondary intention 11/18/20 1100   Drainage Amount Small 11/18/20 1100   Drainage Description Serous;Purulent 11/18/20 1100   Treatments Cleansed;Site care;Offloading;Tape change 11/18/20 1100   Wound Cleansing Approved Wound Cleanser 11/18/20 1100   Periwound Protectant Skin Protectant Wipes to Periwound 11/18/20 1100   Dressing Cleansing/Solutions Not Applicable 11/18/20 1100   Dressing Options Silver Strip Packing;Dry Gauze;Hypafix Tape 11/18/20 1100   Dressing Changed Changed 11/18/20 1100   Dressing Status Clean;Dry;Intact 11/18/20 1100   Dressing Change/Treatment Frequency Every Shift, and As Needed 11/18/20 1100   NEXT Dressing Change/Treatment Date 11/19/20 11/18/20 1100   NEXT Weekly Photo (Inpatient Only) 11/25/20 11/18/20 1100   Non-staged Wound Description Full thickness 11/18/20 1100   Wound Length (cm) 0.3 cm 11/18/20 1100   Wound Width (cm) 0.5 cm 11/18/20 1100   Wound Surface Area (cm^2) 0.15 cm^2 11/18/20 1100   Tunneling (cm) 3.7 cm 11/18/20 1100   Tunneling Clock Position of Wound 9 11/18/20 1100                 Vascular:    SHAYAN:   No results found.    Lab Values:    Lab Results   Component Value  "Date/Time    WBC 18.1 (H) 11/18/2020 03:20 AM    RBC 4.22 (L) 11/18/2020 03:20 AM    HEMOGLOBIN 10.9 (L) 11/18/2020 03:20 AM    HEMATOCRIT 35.2 (L) 11/18/2020 03:20 AM    CREACTPROT 0.54 10/23/2018 08:02 AM    SEDRATEWES 13 10/23/2018 08:02 AM    HBA1C 7.8 (H) 11/10/2020 03:31 AM        Culture Results show:  Recent Results (from the past 720 hour(s))   CULTURE WOUND W/ GRAM STAIN    Collection Time: 11/10/20 10:39 AM    Specimen: Wound   Result Value Ref Range    Significant Indicator POS (POS)     Source WND     Site COLON PERFORATION     Culture Result - (A)     Gram Stain Result       Rare WBCs.  Rare Gram positive cocci.  Rare Gram positive rods.      Culture Result Enterococcus faecalis  Light growth   (A)     Culture Result Candida albicans  Light growth   (A)        Susceptibility    Enterococcus faecalis - ISAMAR     Daptomycin 4 Sensitive mcg/mL     Ampicillin <=2 Sensitive mcg/mL     Gent Synergy <=500 Sensitive mcg/mL     Vancomycin 1 Sensitive mcg/mL     Penicillin 8 Sensitive mcg/mL       INTERVENTIONS BY WOUND TEAM:  Chart and images reviewed. Discussed with bedside RN. This RN in to assess patient. NPWT turned off, removed dressing, no foam retained. Pt has staples at umbilicus and proximal end of open wound that were left in place. Proximal and distal wound openings communicate under umbilicus area. Cleansed with NS and gauze, new pictures and measurements taken. Mepitel one applied over staples. Rest of periwound prepped with benzoin and drape. 1 piece of black strip foam applied on wound bed extending to smaller wound through tunnel underneath. 1 piece of foam used to fill the rest of the wound bed. Foam secured with drape. Hole cut at distal end of proximal wound, applied trak pad, suction obtained at 125mmhg, no leaks present. Left flank wound cleansed w/ wound cleanser and gauze. Silver strip packing 1/4\" used to gently pack wound bed, secure dressing w/ Adhesive foam. See ostomy note below. " Bedside RN Jessica updated.     Interdisciplinary consultation: Patient, Bedside RN (Jessica), ID MD Fang in room at end of dressing change and updated on wound care.     EVALUATION / RATIONALE FOR TREATMENT: abdominal wounds appears to be improving with each dressing change, decrease in slough from last assessment. Continue NPWT on abdomen to promote tissue granulation and drainage control. Silver strip packing to left flank to help close by secondary intention, clean, and manage drainage. (see ostomy note below).      Goals: Steady decrease in wound area and depth weekly.    NURSING PLAN OF CARE ORDERS (X):    Dressing changes: See Dressing Care orders: X  Skin care: See Skin Care orders: X  RN Prevention Protocol: X  Rectal tube care: See Rectal Tube Care orders:   Other orders:      WOUND TEAM PLAN OF CARE:   Dressing changes by wound team:                   Follow up 3 times weekly:                NPWT change 3 times weekly:  X - abdomen   Follow up 1-2 times weekly:    X -left flank  Follow up Bi-Monthly:                   Follow up as needed:       Other (explain):     Anticipated discharge plans: will need ongoing wound care post DC  LTACH:        SNF/Rehab:                  Home Health Care:           Outpatient Wound Center:            Self Care:                [facility]  Inpatient Services  New Ostomy Management & Teaching     HPI:  Reviewed  PMH: Reviewed   SH: Reviewed     Subjective:      Objective:  Colostomy 11/10/20 Transverse LUQ (Active)   Wound Image    11/11/20 1135   Stomal Appliance Assessment Changed 11/18/20 1100   Stoma Assessment Red;Intact 11/18/20 1100   Stoma Shape Budded Less Than One Inch 11/18/20 1100   Stoma Size (in) 1.5 11/18/20 1100   Peristomal Assessment Clean;Dry;Intact 11/18/20 1100   Mucocutaneous Junction Intact 11/18/20 1100   Treatment Appliance Changed;Bag Change 11/18/20 1100   Peristomal Protectant Paste Ring 11/18/20 1100   Stomal Appliance 2  "1/4\" (57mm) CTF;Paste Ring, 2\" 11/18/20 1100   Output (mL) 250 mL 11/18/20 1100   Output Color Black 11/16/20 0209   WOUND RN ONLY - Stomal Appliance  2 Piece;2 1/4\" (57mm) CTF 11/18/20 1100   Appliance Brand Daisy 11/18/20 1100   Appliance Supplier Prism 11/18/20 1100   Secure Start completed Yes 11/18/20 1100   WOUND NURSE ONLY - Time Spent with Patient (mins) 75 11/18/20 1100      Ostomy Appliance (type and size):  2 piece 2 1/4 with paste ring      Interventions:  Previous appliance removed, cleansed skin with warm wash cloth. New template created, barrier cut according to template. Paste ring applied. Barrier applied around stoma. Pouch attached and end of bag closed                   Pt education: Questions and concerns addressed     Evaluation:  Transverse colostomy after colon perforation post drain placement. Patient feeling very nauseous and unwell today, not up for teaching today. Will try again on friday. Pt may need mirror when ostomy teaching done. Pt will need secure start.      Plan: Ostomy nurses to continue to follow for ostomy needs and teaching      Anticipated discharge needs: Supplies, supplier information, possible HH or outpatient ostomy clinic           "

## 2020-11-19 NOTE — DIETARY
Nutrition Services: Update   Day 16 of admit.  Sebastián Castro is a 55 y.o. male with admitting DX of Perforated sigmoid colon (HCC) [K63.1]    Pt is currently on low fiber diet. Noted pt is not eating well, and agreed to Boost Breeze. Per RN pt is experiencing nausea.     Wt 393.8 lbs (11/14) 179 kg via bed scale - 147.1kg (11/04/20 - stand up scale) & 152.8kg (11/05/20 - Bed scale) - surplus of 15 L since 11/05    Skin/wound: pt with midline abdominal wound vac & colostomy, wound care is following.    Malnutrition Risk: criteria not met, but at risk with poor PO intake    Recommendations/Plan:  1. Encourage intake of meals  2. Added Boost Breeze with meals.   3. Document intake of all meals as % taken in ADL's to provide interdisciplinary communication across all shifts.   4. Monitor weight.  5. Nutrition rep will continue to see patient for ongoing meal and snack preferences.      RD following

## 2020-11-19 NOTE — PROGRESS NOTES
General Surgery Progress Note  [facility]      CHIEF COMPLAINT: Abdominal pain.     HISTORY OF PRESENT ILLNESS: The patient is a 55 y.o. male, who presents with perforation at the splenic flexure.  He underwent a Interiano's procedure.  He was subsequently transferred to telemetry as he had a digoxin toxicity episode.     INTERVAL UPDATE: Ostomy functioning.  Experiencing nausea.  White blood cell count normalizing.    PAST MEDICAL HISTORY:  has a past medical history of A-fib ([facility]), Abscess (11/8/2020), Bacteremia (11/8/2020), Chest pain, Congestive heart failure ([facility]), Diabetes ([facility]), Diabetic neuropathy ([facility]), Hypercholesterolemia, Hypertension, Longstanding persistent atrial fibrillation ([facility]) (11/8/2020), LV dysfunction (11/8/2020), Morbid obesity ([facility]), NICM (nonischemic cardiomyopathy) ([facility]) (11/8/2020), Noncompliance, Normal cardiac stress test, Orthostatic hypotension, and Sepsis ([facility]) (11/8/2020).     PAST SURGICAL HISTORY:  has a past surgical history that includes toe amputation (Right, 11/10/2017); toe amputation (Right, 1/17/2018); irrigation & debridement ortho (Right, 2/19/2018); zzz cardiac cath (07/21/2018); and colectomy (N/A, 11/10/2020).     ALLERGIES:   Allergies   Allergen Reactions   • Daptomycin Rash     Body rash  RXN=1/2018     • Pcn [Penicillins] Hives and Rash      Rash & hives  RXN=since a kid  Tolerates cephalosporins   • Unasyn [Ampicillin-Sulbactam Sodium] Hives, Itching and Swelling   • Vancomycin Rash     Red man syndrome. Tolerates IV vancomycin at reduced infusion rate.        CURRENT MEDICATIONS:   Home Medications     Reviewed by James Perez on 11/03/20 at 1859  Med List Status: Complete   Medication Last Dose Status   aspirin EC 81 MG EC tablet 11/3/2020 Active   cefdinir (OMNICEF) 300 MG Cap 11/3/2020 Active   doxycycline monohydrate (ADOXA) 100 MG tablet 11/3/2020 Active   furosemide (LASIX) 40 MG Tab 11/3/2020 Active   lisinopril (PRINIVIL) 10 MG Tab  11/3/2020 Active   metoprolol SR (TOPROL XL) 25 MG TABLET SR 24 HR 11/3/2020 Active   metroNIDAZOLE (FLAGYL) 500 MG Tab 11/3/2020 Active   olanzapine (ZYPREXA) 7.5 MG tablet 11/2/2020 Active   sertraline (ZOLOFT) 100 MG Tab 11/3/2020 Active   sertraline (ZOLOFT) 25 MG tablet 11/3/2020 Active   spironolactone (ALDACTONE) 25 MG Tab  Active                FAMILY HISTORY:   Family History   Problem Relation Age of Onset   • No Known Problems Mother    • No Known Problems Father         SOCIAL HISTORY:   Social History     Tobacco Use   • Smoking status: Never Smoker   • Smokeless tobacco: Never Used   Substance and Sexual Activity   • Alcohol use: No   • Drug use: No   • Sexual activity: Not on file       REVIEW OF SYSTEMS: Comprehensive review of systems was negative aside from nausea    PHYSICAL EXAMINATION:     GENERAL: The patient is in no acute distress.   VITAL SIGNS: /79   Pulse 89   Temp 37.1 °C (98.7 °F) (Temporal)   Resp 16   Ht 1.829 m (6')   Wt (!) 179 kg (394 lb 10 oz)   SpO2 99%   HEAD AND NECK: Demonstrates symmetric, reactive pupils. Extraocular muscles   are intact. Nares and oropharynx are clear.   NECK: Supple. No adenopathy.  CHEST:No respiratory distress.    CARDIOVASCULAR: Regular rate. The extremities are well perfused.   ABDOMEN: Wound VAC in place.  Ostomy with stool in the air in the bag.  BETTE serosanguineous.   EXTREMITIES: Examination of the upper and lower extremities demonstrates no cyanosis edema or clubbing.  NEUROLOGIC: Alert & oriented x 3, Normal motor function, Normal sensory function, No focal deficits noted    Drain output:   Output by Drain (mL) 11/17/20 0700 - 11/17/20 1859 11/17/20 1900 - 11/18/20 0659 11/18/20 0700 - 11/18/20 1859 11/18/20 1900 - 11/19/20 0659 11/19/20 0700 - 11/19/20 0855   Patient has no LDAs of requested type attached.        LABORATORY VALUES:   Recent Labs     11/17/20  0444 11/18/20  0320 11/19/20  0315   WBC 19.1* 18.1* 15.4*   RBC 4.51* 4.22*  4.18*   HEMOGLOBIN 11.9* 10.9* 10.4*   HEMATOCRIT 38.0* 35.2* 35.1*   MCV 84.3 83.4 84.0   MCH 26.4* 25.8* 24.9*   MCHC 31.3* 31.0* 29.6*   RDW 54.7* 53.3* 54.1*   PLATELETCT 253 241 214   MPV 9.6 10.1 10.0     Recent Labs     11/17/20 0444 11/18/20  0320 11/19/20  0315   SODIUM 138 139 138   POTASSIUM 3.4* 3.7 3.8   CHLORIDE 99 98 99   CO2 35* 35* 36*   GLUCOSE 120* 119* 105*   BUN 24* 19 17   CREATININE 1.01 0.76 0.66   CALCIUM 7.4* 7.5* 7.5*     Recent Labs     11/17/20 0444 11/18/20 0320 11/19/20 0315   ASTSGOT 16 14 15   ALTSGPT 9 7 8   TBILIRUBIN 0.8 0.7 0.6   ALKPHOSPHAT 65 66 68   GLOBULIN 2.2 2.6 2.7            IMAGING:   CT-ABDOMEN-PELVIS W/O   Final Result      Postsurgical changes with a left abdominal stoma. Parastomal densities may be related to postsurgical hematoma measuring up to 2.8 x 2.1 cm.      Small amount of free fluid in the abdomen and pelvis.      Collection along the anterior spleen measures approximately 2.9 x 4.3 cm is decreased in size compared to the prior examination. Evaluation is limited by lack of intravenous contrast.      Third spacing.      Trace free intraperitoneal air is likely related to recent surgery.      Moderate left and small right pleural effusions with overlying atelectasis/consolidation.      Cortical scarring of the right kidney.      Cholelithiasis.         DX-CHEST-PORTABLE (1 VIEW)   Final Result      1.  Bilateral airspace and interstitial infiltrates.      2.  Cardiomegaly.      US-RENAL   Final Result    Examination was limited due to poor sound penetration related to body habitus.   Grossly normal appearance of the kidneys.      EC-ECHOCARDIOGRAM COMPLETE W/ CONT   Final Result      DX-CHEST-PORTABLE (1 VIEW)   Final Result      No pneumothorax identified following right subclavian line placement      Worsening atelectasis with consolidation at the bases not excluded      Stable cardiac silhouette enlargement      CT-ABDOMEN-PELVIS WITH   Final Result       Interval decrease in size of perisplenic abscess. Pigtail catheter now in place      Abscess has likely fistulous communication with the abutting splenic flexure of the colon. Colon is otherwise normal in appearance      Mild hepatosplenomegaly      Decreasing small left pleural effusion      Subacute anterior wedge compression fractures in the caudal thoracic spine      CT-DRAIN-PERITONEAL   Final Result      1.  CT guided perisplenic abscess catheter drainage.   2.  The current plan is to obtain a follow-up CT in 5-7 days..      OUTSIDE IMAGES-CT CHEST   Final Result      OUTSIDE IMAGES-CT ABDOMEN /PELVIS   Final Result      CT-ABDOMEN-PELVIS WITH   Final Result   Addendum 1 of 1   Addendum:   A prior noncontrast CT of the abdomen and pelvis from [facility] dated 10/19/2020 was made available for comparison. On the    prior study a perisplenic/subcapsular fluid collection was seen however    did not contain air at that time and    there was no discrete evidence of communication with the transverse colon.    Communication with splenic flexure of the colon and air within the    collection are new from the prior study.      Final      DX-CHEST-PORTABLE (1 VIEW)   Final Result      Slight interval interval improvement in congestive heart failure.          Problem List:   Patient Active Problem List    Diagnosis Date Noted   • Leucocytosis 11/17/2020     Priority: High   • Spontaneous perforation of colon ([facility]) 11/10/2020     Priority: High   • Sepsis ([facility]) 11/08/2020     Priority: High   • Orthostatic hypotension 11/27/2018     Priority: High   • Acute on chronic diastolic heart failure ([facility]) 11/21/2018     Priority: High   • Fall 07/10/2018     Priority: High   • Atypical chest pain 07/09/2018     Priority: High   • Diabetic foot ([facility]) 02/17/2018     Priority: High   • Diabetic infection of right foot ([facility]) 02/02/2018     Priority: High   • Lower limb amputation, great toe ([facility]) 01/15/2018      Priority: High   • Gangrene of toe of right foot, s/p recent amputation ([facility]) 11/11/2017     Priority: High   • Elevated digoxin level 11/17/2020     Priority: Medium   • SRUTHI (acute kidney injury) ([facility]) 11/11/2020     Priority: Medium   • Adrenal insufficiency ([facility]) 11/10/2020     Priority: Medium   • LV dysfunction 11/08/2020     Priority: Medium   • Fall from ground level 01/09/2019     Priority: Medium   • Lumbar compression fracture ([facility]) 12/29/2018     Priority: Medium   • Psychogenic nonepileptic seizure 11/24/2018     Priority: Medium   • Bilateral leg edema 11/20/2018     Priority: Medium   • Venous stasis dermatitis of both lower extremities 11/20/2018     Priority: Medium   • Urinary retention 09/27/2018     Priority: Medium   • Other chest pain 09/10/2018     Priority: Medium   • Difficulty voiding 09/10/2018     Priority: Medium   • Vitamin B12 deficiency 07/24/2018     Priority: Medium   • Paroxysmal A-fib with RVR ([facility]) 07/09/2018     Priority: Medium   • Left knee pain 11/23/2017     Priority: Medium   • Class 2 obesity due to excess calories without serious comorbidity with body mass index (BMI) of 37.0 to 37.9 in adult 10/22/2018     Priority: Low   • Acquired circulating anticoagulants ([facility]) 07/09/2018     Priority: Low   • Essential hypertension 02/08/2018     Priority: Low   • GERD (gastroesophageal reflux disease) 01/15/2018     Priority: Low   • Mixed hyperlipidemia 01/15/2018     Priority: Low   • Type 2 diabetes mellitus, without long-term current use of insulin, with peripheral neuropathy ([facility]) 11/22/2017     Priority: Low   • Candidal intertrigo 11/11/2017     Priority: Low   • Obesity 11/11/2017     Priority: Low   • Schizophrenia ([facility]) 11/17/2020   • NICM (nonischemic cardiomyopathy) ([facility]) 11/08/2020   • Debility 10/26/2019   • Polypharmacy 10/20/2019   • Pre-diabetes 10/19/2019   • Chronic systolic heart failure ([facility]) 11/20/2018   • Fluid overload 11/20/2018   • Nonischemic  cardiomyopathy (HCC) 09/09/2018   • Paralysis of both lower limbs (HCC) 07/22/2018   • Chest pain 07/20/2018   • Dizziness 07/13/2018   • Acute conjunctivitis 07/11/2018   • Chronic atrial fibrillation 02/08/2018   • Hyperglycemia 11/11/2017       IMPRESSION AND PLAN:     1.  Status post Interiano's procedure.    1.  Physical therapy, Occupational Therapy, mobilize  2.  Diet as tolerated.  Ostomy functioning well despite nausea  3.  BETTE drain slowing.  May be able to come out soon.              ___________________________________   Storm Gan M.D.  [facility].  [phone]    DD: 11/19/2020 DT: 8:55 AM

## 2020-11-19 NOTE — PROGRESS NOTES
Pharmacy Kinetics 55 y.o. male on vancomycin day # 3  2020    Currently on Vancomycin 2000 mg iv q24hr  Provider specified end date: per ID    Indication for Treatment: IAI/abscess     Pertinent history per medical record: Admitted on 11/3/2020 for concern for abdominal pain after procedure found to have colon perforation. ID and surgery consulted.     Other antibiotics: ceftriaxone 2 g iv q 24 hrs, Flagyl 500 mg PO q 8hrs, Fluconazole 400 mg PO q 24 hrs      Allergies: Daptomycin, Pcn [penicillins], Unasyn [ampicillin-sulbactam sodium], and Vancomycin      List concerns for renal function: BUN/SCr ratio > 20:1, obesity (BMI 53), low albumin     Pertinent cultures to date:   11/10/20: COLON PERFORATION = Enterococcus faecalis and candida albicans     MRSA nares swab if pneumonia is a concern (ordered/positive/negative/n-a): n/a    Recent Labs     20  0444 20  0320 20  0315   WBC 19.1* 18.1* 15.4*   NEUTSPOLYS 73.10* 75.30* 70.10     Recent Labs     20  0444 20  0320 20  0315   BUN 24* 19 17   CREATININE 1.01 0.76 0.66   ALBUMIN 2.5* 2.4* 2.4*     Recent Labs     20  0315   VANCOTROUGH 18.4       Intake/Output Summary (Last 24 hours) at 2020 1426  Last data filed at 2020 0637  Gross per 24 hour   Intake --   Output 225 ml   Net -225 ml      /94   Pulse 93   Temp 36.6 °C (97.9 °F) (Temporal)   Resp 16   Ht 1.829 m (6')   Wt (!) 179 kg (394 lb 10 oz)   SpO2 97%  Temp (24hrs), Av.8 °C (98.2 °F), Min:36.5 °C (97.7 °F), Max:37.1 °C (98.7 °F)      A/P   1. Vancomycin dose change: yes, decrease to Vanco 1750 mg iv q 24 hours  2. Next vancomycin level: was today = 18.4 mcg/ml  3. Goal trough: 10-15 mcg/ml  4. Comments: Vanco trough level today is 18.4 mcg/ml, but Vanco level was drawn ~3 hours early. Vanco level would have been lower 3 hours later, but still at upper end of goal range and pt is not quite to steady state yet. Pt is at risk for Vanco  accumulation due to a BMI of ~53. Will decrease the dose to vanco 1750 mg iv q 24 hrs. 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 ID is following.    Noah Peacock, PharmD

## 2020-11-19 NOTE — PROGRESS NOTES
Assumed care of patient. Bedside report, received from Jessica PÉREZ. Updated POC, call light within reach, and fall precautions in place. Bed locked and and in lowest position. Patient instructed to call for assistance before getting out of bed. All questions answered and needs addressed.

## 2020-11-19 NOTE — CARE PLAN
Problem: Nutritional:  Goal: Achieve adequate nutritional intake  Description: Advance diet as tolerated past clear liquids. Patient will consume >50% of meals.  Outcome: PROGRESSING SLOWER THAN EXPECTED   See RD note 11/19

## 2020-11-20 ENCOUNTER — APPOINTMENT (OUTPATIENT)
Dept: RADIOLOGY | Facility: MEDICAL CENTER | Age: 55
DRG: 329 | End: 2020-11-20
Attending: STUDENT IN AN ORGANIZED HEALTH CARE EDUCATION/TRAINING PROGRAM
Payer: MEDICAID

## 2020-11-20 LAB
ALBUMIN SERPL BCP-MCNC: 2.6 G/DL (ref 3.2–4.9)
ALBUMIN/GLOB SERPL: 0.9 G/DL
ALP SERPL-CCNC: 79 U/L (ref 30–99)
ALT SERPL-CCNC: <5 U/L (ref 2–50)
ANION GAP SERPL CALC-SCNC: 5 MMOL/L (ref 7–16)
AST SERPL-CCNC: 16 U/L (ref 12–45)
BASOPHILS # BLD AUTO: 0.2 % (ref 0–1.8)
BASOPHILS # BLD: 0.03 K/UL (ref 0–0.12)
BILIRUB SERPL-MCNC: 0.6 MG/DL (ref 0.1–1.5)
BUN SERPL-MCNC: 15 MG/DL (ref 8–22)
CALCIUM SERPL-MCNC: 7.6 MG/DL (ref 8.5–10.5)
CHLORIDE SERPL-SCNC: 97 MMOL/L (ref 96–112)
CO2 SERPL-SCNC: 37 MMOL/L (ref 20–33)
CREAT SERPL-MCNC: 0.58 MG/DL (ref 0.5–1.4)
EKG IMPRESSION: NORMAL
EOSINOPHIL # BLD AUTO: 0.53 K/UL (ref 0–0.51)
EOSINOPHIL NFR BLD: 3.1 % (ref 0–6.9)
ERYTHROCYTE [DISTWIDTH] IN BLOOD BY AUTOMATED COUNT: 54.6 FL (ref 35.9–50)
GLOBULIN SER CALC-MCNC: 2.8 G/DL (ref 1.9–3.5)
GLUCOSE BLD-MCNC: 171 MG/DL (ref 65–99)
GLUCOSE BLD-MCNC: 183 MG/DL (ref 65–99)
GLUCOSE BLD-MCNC: 196 MG/DL (ref 65–99)
GLUCOSE SERPL-MCNC: 150 MG/DL (ref 65–99)
HCT VFR BLD AUTO: 35.5 % (ref 42–52)
HGB BLD-MCNC: 10.6 G/DL (ref 14–18)
IMM GRANULOCYTES # BLD AUTO: 0.38 K/UL (ref 0–0.11)
IMM GRANULOCYTES NFR BLD AUTO: 2.2 % (ref 0–0.9)
LYMPHOCYTES # BLD AUTO: 2.68 K/UL (ref 1–4.8)
LYMPHOCYTES NFR BLD: 15.6 % (ref 22–41)
MAGNESIUM SERPL-MCNC: 1.2 MG/DL (ref 1.5–2.5)
MAGNESIUM SERPL-MCNC: 1.5 MG/DL (ref 1.5–2.5)
MCH RBC QN AUTO: 25.5 PG (ref 27–33)
MCHC RBC AUTO-ENTMCNC: 29.9 G/DL (ref 33.7–35.3)
MCV RBC AUTO: 85.5 FL (ref 81.4–97.8)
MONOCYTES # BLD AUTO: 1.59 K/UL (ref 0–0.85)
MONOCYTES NFR BLD AUTO: 9.3 % (ref 0–13.4)
NEUTROPHILS # BLD AUTO: 11.94 K/UL (ref 1.82–7.42)
NEUTROPHILS NFR BLD: 69.6 % (ref 44–72)
NRBC # BLD AUTO: 0 K/UL
NRBC BLD-RTO: 0 /100 WBC
PLATELET # BLD AUTO: 200 K/UL (ref 164–446)
PMV BLD AUTO: 9.7 FL (ref 9–12.9)
POTASSIUM SERPL-SCNC: 3.7 MMOL/L (ref 3.6–5.5)
PROT SERPL-MCNC: 5.4 G/DL (ref 6–8.2)
RBC # BLD AUTO: 4.15 M/UL (ref 4.7–6.1)
SODIUM SERPL-SCNC: 139 MMOL/L (ref 135–145)
T4 FREE SERPL-MCNC: 1.14 NG/DL (ref 0.93–1.7)
TSH SERPL DL<=0.005 MIU/L-ACNC: 16 UIU/ML (ref 0.38–5.33)
WBC # BLD AUTO: 17.2 K/UL (ref 4.8–10.8)

## 2020-11-20 PROCEDURE — 700111 HCHG RX REV CODE 636 W/ 250 OVERRIDE (IP): Performed by: INTERNAL MEDICINE

## 2020-11-20 PROCEDURE — 99232 SBSQ HOSP IP/OBS MODERATE 35: CPT | Mod: GC | Performed by: HOSPITALIST

## 2020-11-20 PROCEDURE — 302098 PASTE RING (FLAT): Performed by: HOSPITALIST

## 2020-11-20 PROCEDURE — 700105 HCHG RX REV CODE 258: Performed by: INTERNAL MEDICINE

## 2020-11-20 PROCEDURE — 700105 HCHG RX REV CODE 258: Performed by: HOSPITALIST

## 2020-11-20 PROCEDURE — A9270 NON-COVERED ITEM OR SERVICE: HCPCS | Performed by: PSYCHIATRY & NEUROLOGY

## 2020-11-20 PROCEDURE — 74018 RADEX ABDOMEN 1 VIEW: CPT

## 2020-11-20 PROCEDURE — A9270 NON-COVERED ITEM OR SERVICE: HCPCS | Performed by: INTERNAL MEDICINE

## 2020-11-20 PROCEDURE — 84443 ASSAY THYROID STIM HORMONE: CPT

## 2020-11-20 PROCEDURE — 700102 HCHG RX REV CODE 250 W/ 637 OVERRIDE(OP)

## 2020-11-20 PROCEDURE — 700102 HCHG RX REV CODE 250 W/ 637 OVERRIDE(OP): Performed by: STUDENT IN AN ORGANIZED HEALTH CARE EDUCATION/TRAINING PROGRAM

## 2020-11-20 PROCEDURE — 84439 ASSAY OF FREE THYROXINE: CPT

## 2020-11-20 PROCEDURE — 700101 HCHG RX REV CODE 250: Performed by: STUDENT IN AN ORGANIZED HEALTH CARE EDUCATION/TRAINING PROGRAM

## 2020-11-20 PROCEDURE — 700111 HCHG RX REV CODE 636 W/ 250 OVERRIDE (IP): Performed by: HOSPITALIST

## 2020-11-20 PROCEDURE — A9270 NON-COVERED ITEM OR SERVICE: HCPCS | Performed by: STUDENT IN AN ORGANIZED HEALTH CARE EDUCATION/TRAINING PROGRAM

## 2020-11-20 PROCEDURE — 700102 HCHG RX REV CODE 250 W/ 637 OVERRIDE(OP): Performed by: INTERNAL MEDICINE

## 2020-11-20 PROCEDURE — 99233 SBSQ HOSP IP/OBS HIGH 50: CPT | Performed by: PSYCHIATRY & NEUROLOGY

## 2020-11-20 PROCEDURE — 770020 HCHG ROOM/CARE - TELE (206)

## 2020-11-20 PROCEDURE — 700102 HCHG RX REV CODE 250 W/ 637 OVERRIDE(OP): Performed by: HOSPITALIST

## 2020-11-20 PROCEDURE — 83735 ASSAY OF MAGNESIUM: CPT | Mod: 91

## 2020-11-20 PROCEDURE — 80053 COMPREHEN METABOLIC PANEL: CPT

## 2020-11-20 PROCEDURE — 82962 GLUCOSE BLOOD TEST: CPT

## 2020-11-20 PROCEDURE — 99233 SBSQ HOSP IP/OBS HIGH 50: CPT | Performed by: INTERNAL MEDICINE

## 2020-11-20 PROCEDURE — 93005 ELECTROCARDIOGRAM TRACING: CPT | Performed by: STUDENT IN AN ORGANIZED HEALTH CARE EDUCATION/TRAINING PROGRAM

## 2020-11-20 PROCEDURE — 700102 HCHG RX REV CODE 250 W/ 637 OVERRIDE(OP): Performed by: PSYCHIATRY & NEUROLOGY

## 2020-11-20 PROCEDURE — 85025 COMPLETE CBC W/AUTO DIFF WBC: CPT

## 2020-11-20 PROCEDURE — A9270 NON-COVERED ITEM OR SERVICE: HCPCS

## 2020-11-20 PROCEDURE — 700111 HCHG RX REV CODE 636 W/ 250 OVERRIDE (IP): Performed by: STUDENT IN AN ORGANIZED HEALTH CARE EDUCATION/TRAINING PROGRAM

## 2020-11-20 PROCEDURE — A9270 NON-COVERED ITEM OR SERVICE: HCPCS | Performed by: HOSPITALIST

## 2020-11-20 PROCEDURE — 97606 NEG PRS WND THER DME>50 SQCM: CPT

## 2020-11-20 PROCEDURE — 97110 THERAPEUTIC EXERCISES: CPT

## 2020-11-20 PROCEDURE — 700111 HCHG RX REV CODE 636 W/ 250 OVERRIDE (IP): Performed by: NURSE PRACTITIONER

## 2020-11-20 PROCEDURE — 93010 ELECTROCARDIOGRAM REPORT: CPT | Performed by: INTERNAL MEDICINE

## 2020-11-20 RX ORDER — SERTRALINE HYDROCHLORIDE 100 MG/1
100 TABLET, FILM COATED ORAL DAILY
Status: DISCONTINUED | OUTPATIENT
Start: 2020-11-21 | End: 2020-11-20

## 2020-11-20 RX ORDER — AMIODARONE HYDROCHLORIDE 200 MG/1
200 TABLET ORAL DAILY
Status: DISCONTINUED | OUTPATIENT
Start: 2020-11-20 | End: 2020-12-17 | Stop reason: HOSPADM

## 2020-11-20 RX ORDER — RISPERIDONE 0.5 MG/1
0.5 TABLET ORAL EVERY EVENING
Status: DISCONTINUED | OUTPATIENT
Start: 2020-11-20 | End: 2020-11-23

## 2020-11-20 RX ORDER — MAGNESIUM SULFATE HEPTAHYDRATE 40 MG/ML
4 INJECTION, SOLUTION INTRAVENOUS ONCE
Status: COMPLETED | OUTPATIENT
Start: 2020-11-20 | End: 2020-11-20

## 2020-11-20 RX ORDER — LISINOPRIL 20 MG/1
20 TABLET ORAL
Status: DISCONTINUED | OUTPATIENT
Start: 2020-11-21 | End: 2020-12-09

## 2020-11-20 RX ORDER — TRAZODONE HYDROCHLORIDE 50 MG/1
50 TABLET ORAL
Status: DISCONTINUED | OUTPATIENT
Start: 2020-11-20 | End: 2020-12-17 | Stop reason: HOSPADM

## 2020-11-20 RX ORDER — METOPROLOL SUCCINATE 25 MG/1
25 TABLET, EXTENDED RELEASE ORAL ONCE
Status: COMPLETED | OUTPATIENT
Start: 2020-11-20 | End: 2020-11-20

## 2020-11-20 RX ORDER — TRAZODONE HYDROCHLORIDE 50 MG/1
TABLET ORAL
Status: COMPLETED
Start: 2020-11-20 | End: 2020-11-20

## 2020-11-20 RX ADMIN — METRONIDAZOLE 500 MG: 500 INJECTION, SOLUTION INTRAVENOUS at 15:45

## 2020-11-20 RX ADMIN — FENTANYL CITRATE 25 MCG: 50 INJECTION, SOLUTION INTRAMUSCULAR; INTRAVENOUS at 21:21

## 2020-11-20 RX ADMIN — METOPROLOL SUCCINATE 50 MG: 50 TABLET, EXTENDED RELEASE ORAL at 06:01

## 2020-11-20 RX ADMIN — INSULIN HUMAN 3 UNITS: 100 INJECTION, SOLUTION PARENTERAL at 18:06

## 2020-11-20 RX ADMIN — VANCOMYCIN HYDROCHLORIDE 1750 MG: 500 INJECTION, POWDER, LYOPHILIZED, FOR SOLUTION INTRAVENOUS at 05:58

## 2020-11-20 RX ADMIN — LISINOPRIL 10 MG: 10 TABLET ORAL at 06:02

## 2020-11-20 RX ADMIN — METRONIDAZOLE 500 MG: 500 TABLET ORAL at 06:01

## 2020-11-20 RX ADMIN — FENTANYL CITRATE 25 MCG: 50 INJECTION, SOLUTION INTRAMUSCULAR; INTRAVENOUS at 12:44

## 2020-11-20 RX ADMIN — TRAZODONE HYDROCHLORIDE 50 MG: 50 TABLET ORAL at 20:49

## 2020-11-20 RX ADMIN — SERTRALINE HYDROCHLORIDE 75 MG: 50 TABLET ORAL at 06:00

## 2020-11-20 RX ADMIN — MAGNESIUM SULFATE IN WATER 4 G: 40 INJECTION, SOLUTION INTRAVENOUS at 09:02

## 2020-11-20 RX ADMIN — RISPERIDONE 0.5 MG: 0.5 TABLET ORAL at 18:01

## 2020-11-20 RX ADMIN — INSULIN HUMAN 3 UNITS: 100 INJECTION, SOLUTION PARENTERAL at 06:19

## 2020-11-20 RX ADMIN — PROCHLORPERAZINE EDISYLATE 5 MG: 5 INJECTION INTRAMUSCULAR; INTRAVENOUS at 22:07

## 2020-11-20 RX ADMIN — FAMOTIDINE 20 MG: 20 TABLET, FILM COATED ORAL at 06:02

## 2020-11-20 RX ADMIN — FENTANYL CITRATE 50 MCG: 50 INJECTION, SOLUTION INTRAMUSCULAR; INTRAVENOUS at 02:57

## 2020-11-20 RX ADMIN — INSULIN HUMAN 3 UNITS: 100 INJECTION, SOLUTION PARENTERAL at 12:57

## 2020-11-20 RX ADMIN — METOPROLOL SUCCINATE 75 MG: 50 TABLET, EXTENDED RELEASE ORAL at 18:01

## 2020-11-20 RX ADMIN — PROCHLORPERAZINE EDISYLATE 5 MG: 5 INJECTION INTRAMUSCULAR; INTRAVENOUS at 06:17

## 2020-11-20 RX ADMIN — AMIODARONE HYDROCHLORIDE 200 MG: 200 TABLET ORAL at 12:47

## 2020-11-20 RX ADMIN — INSULIN GLARGINE 10 UNITS: 100 INJECTION, SOLUTION SUBCUTANEOUS at 06:20

## 2020-11-20 RX ADMIN — FENTANYL CITRATE 25 MCG: 50 INJECTION, SOLUTION INTRAMUSCULAR; INTRAVENOUS at 18:01

## 2020-11-20 RX ADMIN — CETIRIZINE HYDROCHLORIDE 10 MG: 10 TABLET, FILM COATED ORAL at 06:01

## 2020-11-20 RX ADMIN — ENOXAPARIN SODIUM 40 MG: 40 INJECTION SUBCUTANEOUS at 05:58

## 2020-11-20 RX ADMIN — FENTANYL CITRATE 50 MCG: 50 INJECTION, SOLUTION INTRAMUSCULAR; INTRAVENOUS at 09:00

## 2020-11-20 RX ADMIN — CEFTRIAXONE SODIUM 2 G: 2 INJECTION, POWDER, FOR SOLUTION INTRAMUSCULAR; INTRAVENOUS at 05:57

## 2020-11-20 RX ADMIN — SPIRONOLACTONE 25 MG: 50 TABLET ORAL at 06:02

## 2020-11-20 RX ADMIN — METOPROLOL SUCCINATE 25 MG: 25 TABLET, EXTENDED RELEASE ORAL at 12:47

## 2020-11-20 RX ADMIN — METRONIDAZOLE 500 MG: 500 INJECTION, SOLUTION INTRAVENOUS at 20:50

## 2020-11-20 RX ADMIN — FENTANYL CITRATE 50 MCG: 50 INJECTION, SOLUTION INTRAMUSCULAR; INTRAVENOUS at 05:57

## 2020-11-20 RX ADMIN — FLUCONAZOLE 400 MG: 200 TABLET ORAL at 05:58

## 2020-11-20 ASSESSMENT — PAIN SCALES - WONG BAKER
WONGBAKER_NUMERICALRESPONSE: HURTS A LITTLE MORE
WONGBAKER_NUMERICALRESPONSE: HURTS A LITTLE MORE

## 2020-11-20 ASSESSMENT — ENCOUNTER SYMPTOMS
VOMITING: 1
HEADACHES: 0
SHORTNESS OF BREATH: 1
DOUBLE VISION: 0
SHORTNESS OF BREATH: 0
SORE THROAT: 0
MYALGIAS: 0
DEPRESSION: 1
NERVOUS/ANXIOUS: 1
COUGH: 0
BLURRED VISION: 0
CHILLS: 1
FEVER: 0
NAUSEA: 1
DIARRHEA: 0
VOMITING: 0
CONSTIPATION: 0
ABDOMINAL PAIN: 1
HALLUCINATIONS: 1

## 2020-11-20 ASSESSMENT — COGNITIVE AND FUNCTIONAL STATUS - GENERAL
STANDING UP FROM CHAIR USING ARMS: TOTAL
WALKING IN HOSPITAL ROOM: TOTAL
SUGGESTED CMS G CODE MODIFIER MOBILITY: CN
TURNING FROM BACK TO SIDE WHILE IN FLAT BAD: UNABLE
MOBILITY SCORE: 6
CLIMB 3 TO 5 STEPS WITH RAILING: TOTAL
MOVING TO AND FROM BED TO CHAIR: UNABLE
MOVING FROM LYING ON BACK TO SITTING ON SIDE OF FLAT BED: UNABLE

## 2020-11-20 ASSESSMENT — FIBROSIS 4 INDEX: FIB4 SCORE: 2.15

## 2020-11-20 NOTE — CONSULTS
PSYCHIATRY CONSULT TEAM NOTE: Consult received. Patient is not on a legal hold. Spoke with medical team who clarified that patient started having suicidal thoughts today without a plan. There are also medication concerns given his recent history of prolonged QTC. Psychiatric medication dosage was initially lowered and then restarted at home dose today (?)    Patient will be seen within 24 hours.     Thank you.

## 2020-11-20 NOTE — PROGRESS NOTES
Daily Progress Note:     Date of Service: 11/20/2020  Primary Team: [facility] IM Yellow Team   Attending: JONATHAN Myrick M.D.   Senior Resident: Dr. Peace    Contact:  [phone]    ID: 55-year-old male with past medical history of obesity BMI of 53,  type 2 diabetes mellitus,  atrial fibrillation, congestive heart failure EF of 30% 11/2020 schizophrenia with depression had a history of ground-level fall 6 weeks ago CT abdomen showed subcapsular splenic hematoma seen and discharged on oral antibiotic.  Patient presented on 11/3 with history of increasing abdominal pain, CT abdomen at that time showed intra-abdominal abscess admitted for surgery and got surgery done showed perforated splenic flexure and received colectomy on 11/10 with ostomy placement.  Patient had A. fib with RVR started on digoxin by cardiology on 11/14 levels checked 11/14 1.5 and 11/16 >5.  Transfer to telemetry for further management.  Did not receive antibody to digoxin but repeat digoxin level is 0.8.started on metoprolol XL for AF and titrating.    Subjective: Patient says he is feeling depressed, he raised thought of suicide but no plan, still having nausea and vomiting,, chills, abdominal pain denies any fever, shortness of breath and chest pain.    Consultants/Specialty:  Infectious diseases  Nephrology  Cardiology  General surgery      Review of Systems:  Constitutional: Positive for malaise/fatigue, chills, negative for fever  HEENT: Negative for blurred vision and double vision  Respiratory: Negative for cough  Cardiovascular: Negative for chest pain and palpitations  Gastrointestinal positive for nausea, vomiting  Genitourinary: Negative for dysuria  Musculoskeletal: Positive for back pain  Skin: Negative for itching and rash  Neurological: Positive for weakness, negative for dizziness and headache  Psychiatric: Positive for suicidal ideation but no plans.    Objective Data:   Physical Exam:   Vitals:   Temp:  [36.4 °C (97.6  °F)-37.1 °C (98.7 °F)] 36.6 °C (97.9 °F)  Pulse:  [] 111  Resp:  [16-19] 16  BP: (129-164)/() 157/92  SpO2:  [95 %-99 %] 96 %     Physical Exam  Constitutional:       General: He is not in acute distress.     Appearance: He is obese. He is ill-appearing.   HENT:      Head: Normocephalic and atraumatic.      Right Ear: Tympanic membrane normal.      Left Ear: Tympanic membrane normal.      Nose: No congestion.      Mouth/Throat:      Mouth: Mucous membranes are moist.      Pharynx: Oropharynx is clear. No oropharyngeal exudate.   Eyes:      Comments: R sided strabismus (pre-existing according to patient)    Neck:      Musculoskeletal: Normal range of motion. No neck rigidity.   Cardiovascular:      Rate and Rhythm: Tachycardia present. Rhythm irregular.      Heart sounds: Normal heart sounds. No murmur.   Pulmonary:      Effort: No respiratory distress.      Breath sounds: Normal breath sounds.   Abdominal:      General: Bowel sounds are normal.      Palpations: Abdomen is soft.      Tenderness: There is abdominal tenderness.      Comments: BETTE draining more than yesterday. Colostomy bag clean   Musculoskeletal:      Right lower leg: Edema present.      Left lower leg: Edema present.      Comments: Swollen bilaterally.     Skin:     General: Skin is warm.      Coloration: Skin is not jaundiced.   Neurological:      General: No focal deficit present.      Mental Status: He is alert and oriented to person, place, and time.      Cranial Nerves: No cranial nerve deficit.   Psychiatric:      Comments: Suicidal ideation but no plan.           Labs:   Recent Labs     11/18/20  0320 11/19/20  0315 11/20/20  0313   WBC 18.1* 15.4* 17.2*   RBC 4.22* 4.18* 4.15*   HEMOGLOBIN 10.9* 10.4* 10.6*   HEMATOCRIT 35.2* 35.1* 35.5*   MCV 83.4 84.0 85.5   MCH 25.8* 24.9* 25.5*   RDW 53.3* 54.1* 54.6*   PLATELETCT 241 214 200   MPV 10.1 10.0 9.7   NEUTSPOLYS 75.30* 70.10 69.60   LYMPHOCYTES 11.60* 15.20* 15.60*   MONOCYTES 6.40  7.70 9.30   EOSINOPHILS 4.50 4.60 3.10   BASOPHILS 0.20 0.30 0.20   RBCMORPHOLO  --  Present  --      Recent Labs     11/18/20  0320 11/19/20 0315 11/20/20 0313   SODIUM 139 138 139   POTASSIUM 3.7 3.8 3.7   CHLORIDE 98 99 97   CO2 35* 36* 37*   GLUCOSE 119* 105* 150*   BUN 19 17 15     Recent Labs     11/18/20  0320 11/19/20 0315 11/20/20 0313   ALBUMIN 2.4* 2.4* 2.6*   TBILIRUBIN 0.7 0.6 0.6   ALKPHOSPHAT 66 68 79   TOTPROTEIN 5.0* 5.1* 5.4*   ALTSGPT 7 8 <5   ASTSGOT 14 15 16   CREATININE 0.76 0.66 0.58     Imaging:   CT-ABDOMEN-PELVIS W/O   Final Result      Postsurgical changes with a left abdominal stoma. Parastomal densities may be related to postsurgical hematoma measuring up to 2.8 x 2.1 cm.      Small amount of free fluid in the abdomen and pelvis.      Collection along the anterior spleen measures approximately 2.9 x 4.3 cm is decreased in size compared to the prior examination. Evaluation is limited by lack of intravenous contrast.      Third spacing.      Trace free intraperitoneal air is likely related to recent surgery.      Moderate left and small right pleural effusions with overlying atelectasis/consolidation.      Cortical scarring of the right kidney.      Cholelithiasis.         DX-CHEST-PORTABLE (1 VIEW)   Final Result      1.  Bilateral airspace and interstitial infiltrates.      2.  Cardiomegaly.      US-RENAL   Final Result    Examination was limited due to poor sound penetration related to body habitus.   Grossly normal appearance of the kidneys.      EC-ECHOCARDIOGRAM COMPLETE W/ CONT   Final Result      DX-CHEST-PORTABLE (1 VIEW)   Final Result      No pneumothorax identified following right subclavian line placement      Worsening atelectasis with consolidation at the bases not excluded      Stable cardiac silhouette enlargement      CT-ABDOMEN-PELVIS WITH   Final Result      Interval decrease in size of perisplenic abscess. Pigtail catheter now in place      Abscess has likely  fistulous communication with the abutting splenic flexure of the colon. Colon is otherwise normal in appearance      Mild hepatosplenomegaly      Decreasing small left pleural effusion      Subacute anterior wedge compression fractures in the caudal thoracic spine      CT-DRAIN-PERITONEAL   Final Result      1.  CT guided perisplenic abscess catheter drainage.   2.  The current plan is to obtain a follow-up CT in 5-7 days..      OUTSIDE IMAGES-CT CHEST   Final Result      OUTSIDE IMAGES-CT ABDOMEN /PELVIS   Final Result      CT-ABDOMEN-PELVIS WITH   Final Result   Addendum 1 of 1   Addendum:   A prior noncontrast CT of the abdomen and pelvis from [facility] dated 10/19/2020 was made available for comparison. On the    prior study a perisplenic/subcapsular fluid collection was seen however    did not contain air at that time and    there was no discrete evidence of communication with the transverse colon.    Communication with splenic flexure of the colon and air within the    collection are new from the prior study.      Final      DX-CHEST-PORTABLE (1 VIEW)   Final Result      Slight interval interval improvement in congestive heart failure.      [id number] VIEW    (Results Pending)       * Spontaneous perforation of colon (HCC)- (present on admission)  Assessment & Plan  Admitted with LUQ abscess and perforation at splenic flexure   Segmental colectomy, colostomy, Patti pouch, I & D of abdominal wall abscess  Wound cultures: enterococcus and candida  - BETTE draing still.  - DCed Zyvox due to prolonged QTc  - continue Fluconazole(11/14)  - continue vancomycin (11/17)  - continue ceftriaxone and Flagyl 11/17  - monitor  - informed surgeon about the increased drainage from BETTE.      Schizophrenia (HCC)  Assessment & Plan  Patient with H/O schizophrenia on Olanzapine and sertraline.  Due to prolonged QT decrease the dose of olanzapine and sertraline  QT reduced with discontinuation of  linezolid and Zofran also  Patient starting feeling symptoms of hallucinations, and also patient complaining of suicidal ideation but he had no plan.  -Increased olanzapine and sertraline to home dose  - avoid other QTc prolonging meds.  - For psychiatric consult-paged but did not receive any call back    Leucocytosis  Assessment & Plan  Improving  Secondary to colon perforation  -Blood cultures on 11/31/2 + viridans streptococci, possible contaminant  -Blood culture on 11/5 with no growth to date   - wound culture showing enterococcus, and candida  - Fluconazole  - DC Zyvox due to prolonged QTc  - continue vancomycin (11/17)  - continue ceftriaxone and Flagyl 11/17    Sepsis ([facility])  Assessment & Plan  Secondary to perforation of colon,  Wound cultures: enterococcus & candida.  Started on Linezolid and Fluconazole. But his QTc is prolonged.  DCed linezolide and started on vancomycin.  - continue Fluconazole  - DC Zyvox due to prolonged QTc  - continue  vancomycin (11/17)  - continue ceftriaxone and Flagyl 11/17    Elevated digoxin level  Assessment & Plan  H/o N/v and visual hallucinations including yellow halos, circles and squares  AFTER STARTING digoxin and 11/14 digoxin level 1.5 & on 11/17 Repeat digoxin level > 5,   & 0.8  Patient has N/V and hallucinations.  - MONITOR  - Dced DIGOXIN      SRUTHI (acute kidney injury) ([facility])  Assessment & Plan  improving    LV dysfunction- (present on admission)  Assessment & Plan  BP low after abscess drainage  11/11 Echocardiogram completed with normal left ventricular chamber size.  Moderately reduced left ventricular systolic function.  Left ventricular ejection fraction is visually estimated to be 30-40%  to continue spironolactone 25 qd  - increase metop 75 mg BD  - increase lisinopril 20 mg qd,     Paroxysmal A-fib with RVR ([facility])- (present on admission)  Assessment & Plan  Seen by cardiac electrophysiologist in 2019 and deemed not to be good candidate for anticoagulation due  to frequent falls 2/2 orthostatic hypotension  CHADSVASC of 3 started digoxin by cardiology and on 11/14 checked digoxin level 1.5 & on 11/17 Repeat digoxin level > 5,transferred to tele floor for monitoring, planned to give digoxin Ab but repeat level decreased to 0.8.  Started on metoprolol 50 mg daily, increased dose to BD & monitored on tele.  Rate is controlled.  - increase metop to 75 mg BD  - add amiodarone 200 mg daily      Prolonged QT interval  Assessment & Plan  Patient's QTc on admission 477 increased to 535 with Linezolid, zofran, zoloft, olanzapine.  DCed Linezolid, and zofran  Decreased dose of olanzapine & zoloft  QTc improved from 535 to 425   Patient having symptoms of schizophrenia  - avoid QT prolonging medication.    Type 2 diabetes mellitus, without long-term current use of insulin, with peripheral neuropathy (HCC)- (present on admission)  Assessment & Plan  Patient states blood sugar well controlled at home with metformin. Last a1c of 7.8 on 11/10/20   - continue Lantus 10U daily  - Sliding scale insulin with hypoglycemia protocol  - Ensure strict glucose control

## 2020-11-20 NOTE — THERAPY
Physical Therapy   Daily Treatment     Patient Name: Sebastián Castro  Age:  55 y.o., Sex:  male  Medical Record #: [id number]  Today's Date: 11/20/2020     Precautions: Fall Risk, Other (See Comments)(wound vac,BETTE drain)    Assessment    Pt refused EOB sitting due to fatigue but agreeable to supine B LE therEx for general strengthening. Pt tolerated all exercises well. Handout issued and pt instructed to perform therEx on his own 3x daily. Pt's mobility limited by severe weakness and deconditioning.    Plan    Continue current treatment plan.    DC Equipment Recommendations: Unable to determine at this time  Discharge Recommendations: Recommend post-acute placement for additional physical therapy services prior to discharge home      Subjective    Pt agreeable to PT.     Objective       11/20/20 1121   Cognition    Cognition / Consciousness WDL   Level of Consciousness Alert   Passive ROM Lower Body   Passive ROM Lower Body WDL   Active ROM Lower Body    Active ROM Lower Body  X   Gross Active ROM Gross Active Range of Motion Impaired,  but Appears Adequate for Functional Mobility.   Strength Lower Body   Lower Body Strength  X   Comments Grossly impaired t/o. L LE>R LE   Supine Lower Body Exercise   Supine Lower Body Exercises Yes   Hip Abduction 2 sets of 10;Bilateral   Straight Leg Raises 1 set of 10;Bilateral  (with Mod A)   Short Arc Quad 2 sets of 10;Bilateral   Heel Slide 2 sets of 10;Bilateral   Ankle Pumps 2 sets of 10;Bilateral   Gluteal Isometrics 1 set of 10   Quadriceps Isometrics 1 set of 10   Comments rest required b/w each set, HEP handout issued   Home Exercise Program   Home Exercise Program Patient Able to Complete Home Program Independently, Safely   Comments with exception of SLRs   Bed Mobility    Supine to Sit Refused   Patient / Family Goals    Patient / Family Goal #1 get stronger, walk again   Short Term Goals    Short Term Goal # 1 Pt to move supine to/from eob, no rails, w/ spv in 6 visits to  improve fxl indep   Goal Outcome # 1 goal not met   Short Term Goal # 2 Pt to move sit to/from stand w/ spv in 6 visits w/ spv in 6 visits to improve fxl indep   Goal Outcome # 2 Goal not met   Short Term Goal # 3 Pt to ambulate 150 ft w/ spv in 6 visits to improve fxl indep   Goal Outcome # 3 Goal not met   Education Group   Role of Physical Therapist Patient Response Patient;Acceptance;Explanation;Verbal Demonstration   Exercises - Supine Patient Response Patient;Acceptance;Explanation;Demonstration;Handout;Verbal Demonstration;Action Demonstration   Anticipated Discharge Equipment and Recommendations   DC Equipment Recommendations Unable to determine at this time   Discharge Recommendations Recommend post-acute placement for additional physical therapy services prior to discharge home

## 2020-11-20 NOTE — WOUND TEAM
[facility]  Inpatient Services  Wound and Skin Care Evaluation    Admission Date: 11/3/2020     Last order of IP CONSULT TO WOUND CARE was found on 11/16/2020 from Hospital Encounter on 11/3/2020     HPI, PMH, SH: Reviewed    Unit where seen by Wound Team: [id number]     WOUND CONSULT/FOLLOW UP RELATED TO:   Left flank/Abdominal NPWT dressing/Ostomy appliance change    Self Report / Pain Level:  Some tenderness, premedicated with IV. Tolerated well.        OBJECTIVE:  Vac dressing intact.    WOUND TYPE, LOCATION, CHARACTERISTICS (Pressure Injuries: location, stage, POA or date identified)    Negative Pressure Wound Therapy 11/10/20 Surgical Abdomen Midline (Active)   Vacuum Serial Number [id number]    NPWT Pump Mode / Pressure Setting Ulta;125 mmHg    Dressing Type Medium;Black Foam (Regular)    Number of Foam Pieces Used 5    Canister Changed No    Output (mL) 25 mL    NEXT Dressing Change/Treatment Date 11/23/20            Wound 11/10/20 Full Thickness Wound Abdomen Midline wound vac (Active)   Wound Image   11/18/20 1100   Site Assessment Pink;Red;Yellow    Periwound Assessment Intact    Margins Attached edges    Closure Secondary intention    Drainage Amount Scant    Drainage Description Serosanguineous    Treatments Site care    Wound Cleansing Approved Wound Cleanser    Periwound Protectant Benzoin;Drape;Mepitel    Dressing Cleansing/Solutions Not Applicable    Dressing Options Wound Vac    Dressing Changed Changed    Dressing Status Intact    Dressing Change/Treatment Frequency Monday, Wednesday, Friday, and As Needed    NEXT Dressing Change/Treatment Date 11/23/20    NEXT Weekly Photo (Inpatient Only) 11/25/20    Number of Staples Removed 10    Non-staged Wound Description Full thickness    Wound Length (cm) 18.3 cm    Wound Width (cm) 9.2 cm    Wound Depth (cm) 6.2 cm    Wound Surface Area (cm^2) 168.36 cm^2    Wound Volume (cm^3) 1043.83 cm^3    Shape oval    Wound Odor None    Exposed  Structures Adipose         Vascular:    SHAYAN:   No results found.    Lab Values:    Lab Results   Component Value Date/Time    WBC 17.2 (H) 11/20/2020 03:13 AM    RBC 4.15 (L) 11/20/2020 03:13 AM    HEMOGLOBIN 10.6 (L) 11/20/2020 03:13 AM    HEMATOCRIT 35.5 (L) 11/20/2020 03:13 AM    CREACTPROT 0.54 10/23/2018 08:02 AM    SEDRATEWES 13 10/23/2018 08:02 AM    HBA1C 7.8 (H) 11/10/2020 03:31 AM        Culture Results show:  Recent Results (from the past 720 hour(s))   CULTURE WOUND W/ GRAM STAIN    Collection Time: 11/10/20 10:39 AM    Specimen: Wound   Result Value Ref Range    Significant Indicator POS (POS)     Source WND     Site COLON PERFORATION     Culture Result - (A)     Gram Stain Result       Rare WBCs.  Rare Gram positive cocci.  Rare Gram positive rods.      Culture Result Enterococcus faecalis  Light growth   (A)     Culture Result Candida albicans  Light growth   (A)        Susceptibility    Enterococcus faecalis - ISAMAR     Daptomycin 4 Sensitive mcg/mL     Ampicillin <=2 Sensitive mcg/mL     Gent Synergy <=500 Sensitive mcg/mL     Vancomycin 1 Sensitive mcg/mL     Penicillin 8 Sensitive mcg/mL       INTERVENTIONS BY WOUND TEAM:  Vac dressing removed. Wound and periwound cleansed with Cleanser and gauze. Mepitel applied to over staples. Benzoin and drape applied to vj wound. 3 pieces of foam applied to bigger wound including tunnel connecting to small wound. 1 pc of foam applied to smaller wound and bridged on top of skin to allow tunnel to start healing. 5ht pc used as button for tracpad. Drape and tracpad to foam. No change in vac settings see flowsheet for details.    Interdisciplinary consultation: Patient, Shirlene PÉREZ    EVALUATION / RATIONALE FOR TREATMENT: Abdominal wound with visible adipose and granular tissue. Continue NPWT on abdomen to promote tissue granulation and drainage control. L flank dressing changed by primary RN today. Dressing CDI.     Goals: Steady decrease in wound area and depth  "weekly.    NURSING PLAN OF CARE ORDERS (X):    Dressing changes: See Dressing Care orders: X  Skin care: See Skin Care orders: X  RN Prevention Protocol: X  Rectal tube care: See Rectal Tube Care orders:   Other orders:      WOUND TEAM PLAN OF CARE:   Dressing changes by wound team:                   Follow up 3 times weekly:                NPWT change 3 times weekly:  X - abdomen   Follow up 1-2 times weekly:    X -left flank  Follow up Bi-Monthly:                   Follow up as needed:       Other (explain):     Anticipated discharge plans:  LTACH:        SNF/Rehab:      x            Home Health Care:           Outpatient Wound Center:            Self Care:                [facility]  Inpatient Services  New Ostomy Management & Teaching     HPI:  Reviewed  PMH: Reviewed   SH: Reviewed     Subjective: \" I feel nauseous in the morning\"     Objective: appliance leaking. Needs to be changed to 2.75\" appliance because 2.25\" is too small.    Colostomy 11/10/20 Transverse LUQ (Active)   Wound Image    11/11/20 1135   Stomal Appliance Assessment Intact    Stoma Assessment Red;Intact    Stoma Shape Round;Budded Less Than One Inch    Stoma Size (in) 1.5    Peristomal Assessment Intact    Mucocutaneous Junction Intact    Treatment Appliance Changed    Peristomal Protectant Paste Ring    Stomal Appliance 2 1/4\" (57mm) CTF;Paste Ring, 2\"    Output (mL) 80 mL    Output Color Brown    WOUND RN ONLY - Stomal Appliance  2 Piece;Paste Ring, 2\";2 1/4\" (57mm) CTF    Appliance (Pouch) # [id number]    Appliance Brand Daisy    Appliance Supplier [employer]    Secure Start completed Yes                  Ostomy Appliance (type and size):  2 piece 2 1/4 with paste ring, needs crusting     Interventions:  This RN verbalized each step during change. Previous appliance removed, cleansed skin with warm wash cloth. Peristomal skin crusted with ostomy powder and no sting prep. New barrier traced. Pt refused to cut, Ostomy RN cut " "barrier. Paste ring applied to barrier and adhered to skin. Demonstrated how to open and close pouch. Pt demonstrated x2. Then pouch applied to barrier. Pt taught how to burp bag.                 Pt education: Questions and concerns addressed     Evaluation: Pt requires more hands on education. Agreeable to start performing steps next time if he feels less nauseous. Extra 2.75\" appliances ordered for nursing to place in care bag. Consider using soft convex barrier if ongoing leaking.      Plan: Ostomy nurses to continue to follow for ostomy needs and teaching      Anticipated discharge needs: Supplies, supplier information, possible HH or outpatient ostomy clinic           "

## 2020-11-20 NOTE — CONSULTS
"PSYCHIATRIC FOLLOW-UP:(established) was seen in 2018 by a member of the psychiatry team.  *Reason for admission: off meds for around 3 days, pt has hx of a-fib, felt palpitations as well as chest pain and SOB. pt states he also noticed increased swelling in legs       *Legal Hold Status: SI without plan    Referring Physician:  MARTELL Myrick MD            *HPI: hx of being dx with major depression disorder and put on zyprexa and zoloft in 2018. Pt does not feel these have been effective though he still takes them.    Depression: for the last 1-1.5 years. He was hurt on a job and his health has been problematic since. Was at [facility] recently and told he had \"water on the lungs\" and sent home. Pt has frequent awakening, his appetite has been down for about one week as has is energy. He feels hopeless: that he is not going to get better physically. \"Im trying but I feel so tired\" referring to trying to build strength. He denies SI. Does feel a burden to \"everyone\" . No support system here in [place].    Anxiety: secondary to worry about what will happen medically to him, if he will be independent or not.     Psychosis: hears mood congruent voices saying \"you're hopeless\". Not bizarre, not command    Unique: denies.     Other: when he is doing well he is \"happy go [place]\" (in contradistinction to his earlier statements, he felt this way a week ago).     Social: lives in a group home but is being transferred: says the manager there told him he was too difficult to care for after he fell.     Drugs/alcohol: denies    Past psych: seen by psychiatry 2018. No SAs or hospitalization since then.     EKG: personally reviewed QTc: 473  Results for EMELY FLORES (MRN [id number]) as of 11/20/2020 16:09   Ref. Range 11/20/2020 03:13   WBC Latest Ref Range: 4.8 - 10.8 K/uL 17.2 (H)   Results for EMELY FLORES (MRN [id number]) as of 11/20/2020 16:09   Ref. Range 11/20/2020 03:13   Neutrophils (Absolute) Latest Ref Range: 1.82 - 7.42 " "K/uL 11.94 (H)   Results for EMELY FLORES (MRN [id number]) as of 11/20/2020 16:09   Ref. Range 11/20/2020 03:13   Calcium Latest Ref Range: 8.5 - 10.5 mg/dL 7.6 (L)   Results for EMELY FLORES (MRN [id number]) as of 11/20/2020 16:09   Ref. Range 9/8/2020 02:10   TSH Latest Ref Range: 0.380 - 5.330 uIU/mL 8.740 (H)   Free T-4 Latest Ref Range: 0.93 - 1.70 ng/dL 1.48        *Psychiatric Examination:   Vitals:   Vitals:    11/20/20 0200 11/20/20 0511 11/20/20 0802 11/20/20 1147   BP: 129/69 (!) 164/101 (!) 163/117 157/92   Pulse: 99 (!) 120 (!) 131 (!) 111   Resp: 19 16 16 16   Temp: 36.4 °C (97.6 °F) 37.1 °C (98.7 °F) 36.6 °C (97.9 °F) 36.6 °C (97.9 °F)   TempSrc: Temporal Temporal Temporal Temporal   SpO2: 96%  99% 96%   Weight:       Height:         General Appearance:  obese, good eye contact and cooperative  Abnormal Movements: none   Gait and Posture: not observed  Speech: within normal limits  Thought Process: normal rate  Associations:   linear  Abnormal or Psychotic Thoughts: AH  Judgement and Insight: fair  Orientation: grossly intact  Recent and Remote Memory: grossly intact  Attention Span and Concentration: intact  Language:fluent  Fund of Knowledge: not tested  Mood and Affect: depressed and anxious but does not look it  SI/HI:  homicidal - no and vague SI  MMSE score and/or clock drawing:not applicable      *ASSESSMENT/RECOMENDATIONS:  1. Depressive disorder unspc with anxiety  -hx of major depression but currently his symptoms have to do with his medical issues.  -will dc zyprexa because of its predilection to cause a metabolic syndrome which he already has. Trial of risperdol 0.5 mg hs. His \"voices\" are what he thinks of himself.  -dc zoloft and trial modafinil for energy and mood, 100 mg daily. Will need further increase to 200 mg daily in a few days if tolerated. No effect on QTc.  -trazodone for sleep 50 mg           2. Hx of functional neurological symptoms  -        3. 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 nevertheless: flagyl is much more likely to prolong it  -L shift  -hypocalcemia  -hypothyroidism? See above  -HTN     Legal hold: not applicable  Observation status: routine  Privileges (while on legal hold): no restrictions    *Will Follow  *Thank you for the consult

## 2020-11-20 NOTE — PROGRESS NOTES
Pharmacy Kinetics 55 y.o. male on vancomycin day # 4  2020    Currently on Vancomycin 1750 mg iv q24hr  Provider specified end date: per ID     Indication for Treatment: IAI/abscess     Pertinent history per medical record: Admitted on 11/3/2020 for concern for abdominal pain after procedure found to have colon perforation. ID and surgery consulted.     Other antibiotics: ceftriaxone 2 g iv q 24 hrs, Flagyl 500 mg IV q 8hrs, Fluconazole 400 mg PO q 24 hrs      Allergies: Daptomycin, Pcn [penicillins], Unasyn [ampicillin-sulbactam sodium], and Vancomycin      List concerns for renal function: BUN/SCr ratio > 20:1, obesity (BMI 53), low albumin     Pertinent cultures to date:   11/10/20: COLON PERFORATION = Enterococcus faecalis and candida albicans     MRSA nares swab if pneumonia is a concern (ordered/positive/negative/n-a): n/a    Recent Labs     20  0320 20  0315 20  0313   WBC 18.1* 15.4* 17.2*   NEUTSPOLYS 75.30* 70.10 69.60     Recent Labs     20  0320 20  0315 20  0313   BUN 19 17 15   CREATININE 0.76 0.66 0.58   ALBUMIN 2.4* 2.4* 2.6*     Recent Labs     20  0315   VANCOTROUGH 18.4       Intake/Output Summary (Last 24 hours) at 2020 1434  Last data filed at 2020 0800  Gross per 24 hour   Intake --   Output 880 ml   Net -880 ml      /92   Pulse (!) 111   Temp 36.6 °C (97.9 °F) (Temporal)   Resp 16   Ht 1.829 m (6')   Wt (!) 179 kg (394 lb 10 oz)   SpO2 96%  Temp (24hrs), Av.7 °C (98.1 °F), Min:36.4 °C (97.6 °F), Max:37.1 °C (98.7 °F)      A/P   1. Vancomycin dose change: No   2. Next vancomycin level: ~2 days (not ordered)  3. Goal trough: 10-15 mcg/ml  4. Comments: Decreased vanco dose started this morning. Will check another vanco trough level on  (not ordered). Pt is at risk for vanco accumulation with a BMI of 53. Vanco infusion extended due to hx of [place]. Will continue current vanco dosing. ID is following.     Noah  Ayush, PharmD

## 2020-11-20 NOTE — PROGRESS NOTES
Assumed care at 0645, bedside report received from night shift RN. Pt is Afib on the telemetry monitor. Patient is AO x 4 and is resting I nbed. Initial assessment completed and orders reviewed. POC discussed with patient. Call light within reach and hourly rounding in place. No further questions at this time. Fall precautions in place.

## 2020-11-20 NOTE — PROGRESS NOTES
Infectious Disease Progress Note    Author: Dianne Fang M.D. Date & Time of service: 11/20/2020  9:49 AM    Chief Complaint:  Intrabdominal abscess and bacteremia     Interval History:   11/9 AF WBC 9.4 Less pain in side but remains tender to touch. Denies SE Zyvox-counseled about sxs serotonin syndrome  11/11 T-max 99.7 WBC 15.4 patient underwent colectomy, colostomy placement and I&D of flank wound with wound VAC placement on 11/10/2020 by Dr. Desouza.  Postop course complicated by hypotension and transferred to the ICU.  On pressors.  Patient complaining of shortness of breath as well as lightheadedness.  Also has postop abdominal pain.  11/12 afebrile WBC 13.3.  Patient weaned off pressors overnight.  Operative cultures growing group D Enterococcus and yeast.  Patient feeling better today.  Improved shortness of breath.  Abdominal pain stable  11/13 afebrile WBC 13.8 patient continues to feel better.  He is inquiring when he can have food by mouth.  11/14 afebrile WBC 9.9.  Patient feeling a bit better today.  His diet has been slowly advanced.  He is however complaining of some sharp abdominal pain when he coughs  11/16  AF, O2 RA, abdominal pain and poor appetite.   11/17 AF, O2 1 L NC , WBC 19.1. Drain output 465 cc yesterday. Ongoing nausea, poor appetite and abdomen is quite tender on palpation.  Will broaden antibiotics.   11/18 AF, O2 1 L NC, WBC 18.1, Drain output- 610 cc. Patient still has abdominal pain and significant nausea.  Examined abdominal wound photos and discussed with wound care team.  He has a large wound but clean appearing base with granulation tissue.  No obvious infection in the surrounding tissues.  Left side wound with packing in place, ongoing drainage but clearing .         Review of Systems:  Review of Systems   Respiratory: Negative for cough and shortness of breath.    Cardiovascular: Negative for chest pain.   Gastrointestinal: Positive for abdominal pain and nausea.  Negative for constipation, diarrhea and vomiting.       Hemodynamics:  Temp (24hrs), Av.7 °C (98.1 °F), Min:36.4 °C (97.6 °F), Max:37.1 °C (98.7 °F)  Temperature: 36.6 °C (97.9 °F)  Pulse  Av.4  Min: 51  Max: 161   Blood Pressure: (!) 163/117(Rn notified )      Physical Exam:  Physical Exam  Constitutional:       Appearance: Normal appearance. He is obese.   Cardiovascular:      Rate and Rhythm: Normal rate and regular rhythm.      Heart sounds: Normal heart sounds.   Pulmonary:      Effort: Pulmonary effort is normal.      Breath sounds: Normal breath sounds.   Abdominal:      General: Abdomen is flat. There is no distension.      Palpations: Abdomen is soft.      Tenderness: There is abdominal tenderness.      Comments: Wound VAC in place, drain on left with clear fluid.  Tenderness to palpation.   Musculoskeletal:      Right lower leg: No edema.      Left lower leg: Edema present.   Skin:     General: Skin is warm and dry.   Neurological:      General: No focal deficit present.      Mental Status: He is alert and oriented to person, place, and time.   Psychiatric:         Mood and Affect: Mood normal.         Behavior: Behavior normal.         Meds:    Current Facility-Administered Medications:   •  magnesium sulfate  •  enoxaparin (LOVENOX) injection  •  OLANZapine  •  vancomycin  •  prochlorperazine  •  metoprolol SR  •  Metoprolol Tartrate  •  Respiratory Therapy Consult  •  sertraline  •  MD Alert...Vancomycin per Pharmacy  •  cefTRIAXone (ROCEPHIN) IV  •  metroNIDAZOLE  •  lisinopril  •  spironolactone  •  cetirizine  •  famotidine  •  fentaNYL  •  fluconazole  •  insulin glargine  •  insulin regular **AND** POC Blood Glucose **AND** NOTIFY MD and PharmD **AND** glucose **AND** dextrose 50%  •  albuterol    Labs:  Recent Labs     20  0320 20  0315 20  0313   WBC 18.1* 15.4* 17.2*   RBC 4.22* 4.18* 4.15*   HEMOGLOBIN 10.9* 10.4* 10.6*   HEMATOCRIT 35.2* 35.1* 35.5*   MCV 83.4 84.0  85.5   MCH 25.8* 24.9* 25.5*   RDW 53.3* 54.1* 54.6*   PLATELETCT 241 214 200   MPV 10.1 10.0 9.7   NEUTSPOLYS 75.30* 70.10 69.60   LYMPHOCYTES 11.60* 15.20* 15.60*   MONOCYTES 6.40 7.70 9.30   EOSINOPHILS 4.50 4.60 3.10   BASOPHILS 0.20 0.30 0.20   RBCMORPHOLO  --  Present  --      Recent Labs     11/18/20 0320 11/19/20 0315 11/20/20 0313   SODIUM 139 138 139   POTASSIUM 3.7 3.8 3.7   CHLORIDE 98 99 97   CO2 35* 36* 37*   GLUCOSE 119* 105* 150*   BUN 19 17 15     Recent Labs     11/18/20 0320 11/19/20 0315 11/20/20 0313   ALBUMIN 2.4* 2.4* 2.6*   TBILIRUBIN 0.7 0.6 0.6   ALKPHOSPHAT 66 68 79   TOTPROTEIN 5.0* 5.1* 5.4*   ALTSGPT 7 8 <5   ASTSGOT 14 15 16   CREATININE 0.76 0.66 0.58       Imaging:  Ct-abdomen-pelvis W/o    Result Date: 11/17/2020 11/17/2020 1:51 PM HISTORY/REASON FOR EXAM:  Nausea/vomiting; elevated WBC; R/O abscess. TECHNIQUE/EXAM DESCRIPTION: CT scan of the abdomen and pelvis without contrast. Noncontrast helical scanning was obtained from the diaphragmatic domes through the pubic symphysis. Low dose optimization technique was utilized for this CT exam including automated exposure control and adjustment of the mA and/or kV according to patient size. COMPARISON: 11/8/2020. FINDINGS: There is a moderate left and small right pleural effusion with overlying atelectasis/consolidation. Trace free air is seen in the abdomen. Evaluation of parenchymal and vascular structures is limited by the lack of intravenous contrast. The liver appears  unremarkable. There are gallstones within the gallbladder. There is hypodensity along the anterior aspect of the spleen measuring approximately 4.3 x 2.9 cm. No adrenal mass is identified. There is right renal atrophy with cortical scarring. There is no  evidence of hydronephrosis. Pancreas appears unremarkable. Aorta is not aneurysmal. There are small inguinal, retroperitoneal and mesenteric lymph nodes. There is no evidence of bowel obstruction. Visualized  appendix appears unremarkable. There is a left abdominal stoma. There is parastomal density measuring up to 2.8 x 2.1 cm likely representing a postsurgical hematoma. There is a small amount of free fluid in the abdomen and pelvis. Bladder is mildly distended. There is a surgical drain in the left abdomen. There is third spacing. Degenerative changes are seen in the spine. Compression deformities of T11 and T12 are again noted. Bones are demineralized.     Postsurgical changes with a left abdominal stoma. Parastomal densities may be related to postsurgical hematoma measuring up to 2.8 x 2.1 cm. Small amount of free fluid in the abdomen and pelvis. Collection along the anterior spleen measures approximately 2.9 x 4.3 cm is decreased in size compared to the prior examination. Evaluation is limited by lack of intravenous contrast. Third spacing. Trace free intraperitoneal air is likely related to recent surgery. Moderate left and small right pleural effusions with overlying atelectasis/consolidation. Cortical scarring of the right kidney. Cholelithiasis.     Ct-abdomen-pelvis With    Result Date: 11/9/2020 11/8/2020 11:34 PM HISTORY/REASON FOR EXAM:  Abdominal infection suspected; Has perisplenic abscess with possible connection to bowel TECHNIQUE/EXAM DESCRIPTION: CT of the abdomen and pelvis with contrast. Contrast-enhanced helical scanning was obtained from the diaphragmatic domes through the pubic symphysis following the bolus administration of 100 mL of nonionic contrast (Omnipaque 350) without complication. Low dose optimization technique was utilized for this CT exam including automated exposure control and adjustment of the mA and/or kV according to patient size. COMPARISON: 11/3/2020 CT FINDINGS: The visualized lung bases show diminished small left pleural fluid that layers dependently. There is similar middle and left lower lobe atelectasis . Similar multichamber cardiac enlargement CT Abdomen: There is a new  pigtail catheter in the anterior perisplenic fluid collection. The collection does diminish in size now measuring 58 x 33 from 79 x 40 mm. There is still gas within it and it is in direct contact with the splenic flexure of the colon. A small amount of gas is also seen in the more superficial soft tissues inferior to the pigtail catheter which crosses the ninth intercostal space No new abscess is detected Contrast is present in the small bowel but not yet in the transverse colon. No contrast extravasation is seen. No abnormal bowel dilatation The pancreas, adrenal glands and kidneys enhance normally. Mild hepatosplenomegaly Some low-density in the gallbladder likely indicating cholelithiasis There is no lymphadenopathy or aneurysmal change of the aorta. Compression deformities in the spine are again seen with subacute fractures resulting in anterior wedging at T11 and T12 There is diffuse fat stranding indicating anasarca CT Pelvis: Visualized pelvic structures are unremarkable. Prostate is within normal limits for age. No lymphadenopathy. There is no free air or free fluid.     Interval decrease in size of perisplenic abscess. Pigtail catheter now in place Abscess has likely fistulous communication with the abutting splenic flexure of the colon. Colon is otherwise normal in appearance Mild hepatosplenomegaly Decreasing small left pleural effusion Subacute anterior wedge compression fractures in the caudal thoracic spine    Ct-abdomen-pelvis With    Addendum Date: 11/5/2020    Addendum: A prior noncontrast CT of the abdomen and pelvis from [facility] dated 10/19/2020 was made available for comparison. On the prior study a perisplenic/subcapsular fluid collection was seen however did not contain air at that time and there was no discrete evidence of communication with the transverse colon. Communication with splenic flexure of the colon and air within the collection are new from the prior  study.    Result Date: 11/5/2020  11/3/2020 4:02 PM HISTORY/REASON FOR EXAM:  Abd pain, unspecified; IV contrast only. TECHNIQUE/EXAM DESCRIPTION:   CT scan of the abdomen and pelvis with contrast. Contrast-enhanced helical scanning was obtained from the diaphragmatic domes through the pubic symphysis following the bolus administration of nonionic contrast without complication. 100 mL of Omnipaque 350 nonionic contrast was administered without complication. Low dose optimization technique was utilized for this CT exam including automated exposure control and adjustment of the mA and/or kV according to patient size. COMPARISON: 9/26/2018 FINDINGS: Chest Base: Small left pleural effusion with mild bibasilar atelectasis. Heart is mildly enlarged. Liver:  Diffuse hypoattenuation of the liver suggesting fatty infiltration. Moderately enlarged. Gallbladder: Cholelithiasis. Biliary tract: Nondilated. Pancreas: Normal. Spleen: There is a rim-enhancing perisplenic fluid collection which contains air and which communicates with the splenic flexure of the colon. This most likely represents an abscess and measures about 8.7 x 7.9 cm. The anterior aspect of the spleen is nonenhancing with irregular margins could be related to infection of splenic parenchyma or infarction. Adrenals: Normal. Kidneys and Collecting Systems:  There is mild right renal cortical scarring. No hydronephrosis. No renal mass. Gastrointestinal tract:  No bowel obstruction. The appendix is normal. Peritoneum: No free air or free fluid. Reproductive organs:  Normal. Bladder:  Normal. Vessels:  Normal caliber. Lymph  Nodes:  No lymphadenopathy. Abdominal wall: Within normal limits. Bones:  Age indeterminate possibly subacute to chronic T11 and T12 superior endplate compression fractures.     1.  Peripherally enhancing perisplenic fluid collection which communicates with the splenic flexure of the colon and which contains foci of air is most consistent with an  abscess/infected fluid collection. There is nonenhancement of the anterior aspect of the spleen suggesting infection/infarction. Surgical consultation is recommended. 2.  Hepatomegaly and hepatic steatosis. 3.  Cholelithiasis. 4.  Right renal cortical scarring. No hydronephrosis. 5.  Small left pleural effusion. Mild bibasilar atelectasis. 6.  Subacute to chronic appearing T11 and T12 superior endplate compression fractures. These findings were discussed with SVETLANA PAREDES on 11/3/2020 4:49 PM.     Ct-drain-peritoneal    Result Date: 11/4/2020 11/4/2020 5:19 PM HISTORY/REASON FOR EXAM:  Joya Splenic Fluid Collection TECHNIQUE/EXAM DESCRIPTION: LUQ parasplenic abscess drainage with CT guidance. Low dose optimization technique was utilized for this CT exam including automated exposure control and adjustment of the mA and/or kV according to patient size. PROCEDURE: Informed consent was obtained. SEDATION: Moderate sedation was provided. Pulse oximetry and continuous cardiac monitoring by the nurse was performed throughout the exam. Intraservice time was 30 minutes. Localizing CT images were obtained with the patient in supine position. The skin was prepped with Betadine and draped in a sterile fashion. Following local anesthesia with 1% Lidocaine, a 17 G guiding needle was placed and needle path confirmed with CT. An Amplatz guidewire was placed and following serial tract dilatation, a 10 Kiswahili pigtail locking catheter was placed. A specimen was collected and submitted for culture and sensitivity and Gram stain. Total of 6 mL of Brownish fluid was drained. The catheter was secured to the skin and connected to suction bulb drainage. Final CT images were obtained documenting catheter position. The patient tolerated the procedure well with no evidence of complication. COMPARISON: CT scan abdomen and pelvis 11/3/2020 FINDINGS: The final CT images show satisfactory catheter position within the target collection.          1.  CT guided perisplenic abscess catheter drainage. 2.  The current plan is to obtain a follow-up CT in 5-7 days..    Dx-chest-portable (1 View)    Result Date: 11/17/2020 11/17/2020 9:19 AM HISTORY/REASON FOR EXAM: Elevated white blood cell count. TECHNIQUE/EXAM DESCRIPTION AND NUMBER OF VIEWS: Single portable view of the chest. COMPARISON: 11/10/2020 FINDINGS: There are bilateral interstitial and airspace infiltrates. There is small bilateral pleural effusion. The heart is enlarged. Right subclavian central line is again seen.     1.  Bilateral airspace and interstitial infiltrates. 2.  Cardiomegaly.    Dx-chest-portable (1 View)    Result Date: 11/10/2020  11/10/2020 12:29 PM HISTORY/REASON FOR EXAM: new central line. TECHNIQUE/EXAM DESCRIPTION AND NUMBER OF VIEWS: Single AP view of the chest. COMPARISON: November 3 FINDINGS: Hardware: ] Tip overlies the cavoatrial junction Lungs: Linear middle lobe and left basilar opacity is seen. Volumes are low Pleura:  No pneumothorax is identified Heart and mediastinum: There is stable cardiac silhouette enlargement.     No pneumothorax identified following right subclavian line placement Worsening atelectasis with consolidation at the bases not excluded Stable cardiac silhouette enlargement    Dx-chest-portable (1 View)    Result Date: 11/3/2020  11/3/2020 3:41 PM HISTORY/REASON FOR EXAM:  Chest Pain. TECHNIQUE/EXAM DESCRIPTION AND NUMBER OF VIEWS: Single portable view of the chest. COMPARISON: September 10, 2020 FINDINGS: The cardiac size is enlarged. There is interstitial prominence. Lung aeration has improved. No pneumothorax. No obvious pleural effusion.     Slight interval interval improvement in congestive heart failure.    Us-renal    Result Date: 11/11/2020 11/11/2020 6:21 PM HISTORY/REASON FOR EXAM:  Abnormal Labs TECHNIQUE/EXAM DESCRIPTION: Renal ultrasound. COMPARISON:  None FINDINGS: The right kidney measures 9.39 cm. The left kidney measures 9.54 cm.  There is no hydronephrosis. There are no abnormal calcifications. The bladder not fully the time of imaging.      Examination was limited due to poor sound penetration related to body habitus. Grossly normal appearance of the kidneys.    Ec-echocardiogram Complete W/ Cont    Result Date: 2020  Transthoracic Echo Report Echocardiography Laboratory CONCLUSIONS Normal left ventricular chamber size. Moderately reduced left ventricular systolic function. Left ventricular ejection fraction is visually estimated to be 30-40%; variable related to atrial fibrillation. Mild mitral regurgitation. Moderately dilated right ventricle. Reduced right ventricular systolic function. Right heart pressures are normal. No prior study is available for comparison. EMELY FLORES Exam Date:         2020                    11:34 Exam Location:     Inpatient Priority:          Routine Ordering Physician:        SUZI EWING Referring Physician: Sonographer:               Sherry Funes RDCS Age:    55     Gender:    M MRN:    [id number] :    1965 BSA:    2.7    Ht (in):    72     Wt (lb):    350 Exam Type:     Complete, Contrast Indications:     Congestive Heart Failure ICD Codes:       428 CPT Codes:       64323,  BP:   136    /   86     HR:   100 Technical Quality:       Technically difficult study -                          adequate information is obtained MEASUREMENTS  (Male / Female) Normal Values 2D ECHO LV Diastolic Diameter PLAX        4.6 cm                4.2 - 5.9 / 3.9 - 5.3 cm LV Systolic Diameter PLAX         4.1 cm                2.1 - 4.0 cm IVS Diastolic Thickness           1.1 cm                LVPW Diastolic Thickness          0.85 cm               LVOT Diameter                     2.5 cm                LV Ejection Fraction MOD 4C       34.7 %                IVC Diameter                      0.95 cm               DOPPLER AV Peak Velocity                  1.3 m/s               AV Peak Gradient                   6.3 mmHg              AV Mean Gradient                  3.9 mmHg              LVOT Peak Velocity                1 m/s                 AV Area Cont Eq vti               3.7 cm2               TR Peak Velocity                  232 cm/s              * Indicates values subject to auto-interpretation LV EF:        % FINDINGS Left Ventricle 3 mL of contrast was administered. Contrast was used to enhance visualization of the endocardial border. Existing IV was used. Mild concentric left ventricular hypertrophy. Normal left ventricular chamber size.  Moderately reduced left ventricular systolic function. Left ventricular ejection fraction is visually estimated to be 30-40%. Diastolic function is difficult to assess with atrial fibrillation. Right Ventricle Moderately dilated right ventricle. Reduced right ventricular systolic function. Right Atrium Enlarged right atrium. Normal inferior vena cava size and inspiratory collapse. Left Atrium Normal left atrial size. Mitral Valve Structurally normal mitral valve. Mild mitral regurgitation. No mitral stenosis. Aortic Valve Tricuspid aortic valve. Structurally normal aortic valve. No stenosis or regurgitation seen. Tricuspid Valve Structurally normal tricuspid valve. Mild tricuspid regurgitation. Right atrial pressure is estimated to be 3 mmHg. Estimated right ventricular systolic pressure  is 25 mmHg. No tricuspid stenosis. Right heart pressures are normal. Pulmonic Valve Structurally normal pulmonic valve without significant stenosis or regurgitation. Pericardium Normal pericardium without effusion. Aorta The aortic root is normal.  Ascending aorta diameter is 3.6 cm. Germain Pacheco M.D. (Electronically Signed) Final Date:     11 November 2020                 12:38 Amended:        11 November 2020 12:40      Micro:  Results     Procedure Component Value Units Date/Time    URINALYSIS [[id number]]  (Abnormal) Collected: 11/17/20 9718    Order Status: Completed  Specimen: Urine, Clean Catch Updated: 11/17/20 2350     Color Yellow     Character Cloudy     Specific Gravity 1.018     Ph 5.0     Glucose Negative mg/dL      Ketones Negative mg/dL      Protein 30 mg/dL      Bilirubin Negative     Urobilinogen, Urine 0.2     Nitrite Negative     Leukocyte Esterase Negative     Occult Blood Small     Micro Urine Req Microscopic    Narrative:      Collected By:[id number] ELDER SENA    CULTURE WOUND W/ GRAM STAIN [[id number]]  (Abnormal)  (Susceptibility) Collected: 11/10/20 1039    Order Status: Completed Specimen: Wound Updated: 11/16/20 1152     Significant Indicator POS     Source WND     Site COLON PERFORATION     Culture Result -     Gram Stain Result Rare WBCs.  Rare Gram positive cocci.  Rare Gram positive rods.       Culture Result Enterococcus faecalis  Light growth        Candida albicans  Light growth      Susceptibility     Enterococcus faecalis (1)     Antibiotic Interpretation Microscan Method Status    Daptomycin Sensitive 4 mcg/mL ISAMAR Final    Ampicillin Sensitive <=2 mcg/mL ISAMAR Final    Gent Synergy Sensitive <=500 mcg/mL ISAMAR Final    Vancomycin Sensitive 1 mcg/mL ISAMAR Final    Penicillin Sensitive 8 mcg/mL ISAMAR Final                   Anaerobic Culture [[id number]] Collected: 11/10/20 1039    Order Status: Completed Specimen: Wound Updated: 11/16/20 1152     Significant Indicator NEG     Source WND     Site COLON PERFORATION     Culture Result No Anaerobes isolated.          Assessment:  Active Hospital Problems    Diagnosis   • *Spontaneous perforation of colon ([facility]) [K63.1]   • Leucocytosis [D72.829]   • Sepsis ([facility]) [A41.9]   • Elevated digoxin level [R78.89]   • SRUTHI (acute kidney injury) ([facility]) [N17.9]   • Adrenal insufficiency ([facility]) [E27.40]   • LV dysfunction [I51.9]   • Paroxysmal A-fib with RVR ([facility]) [I48.0]   • Type 2 diabetes mellitus, without long-term current use of insulin, with peripheral neuropathy ([facility]) [E11.9]   • Prolonged QT interval [R94.31]   •  Schizophrenia ([facility]) [F20.9]     Interval 24 hours:      AF, tachycardia and hypertensive    Labs reviewed, WBC 17.2   Micro reviewed  Drain output-205 cc yesterday    Patient still complaining of nausea and agree with plan to transition Flagyl to IV to see if this helps.  Prefer to have anaerobic coverage on so do not want to DC.  Patient still with abdominal pain and elevated white count.      Assessment:  55 y.o. diabetic male admitted 11/3/2020.+ A. fib, CHD, and severe morbid obesity.  Seen by ID in the past for right foot abscess with cultures positive for MRSA and Proteus.  s/p GLF approximately 1 month ago and was admitted to [facility] with left upper quadrant pain.  CT at the time revealed a subcapsular splenic hematoma. Presented to ED complaining of abdominal pain.     Hospital Course:   CT scan on 11/3 revealed a peripherally enhancing perisplenic fluid collection measuring 8.7 x 7.9 cm, communicating with the splenic flexure of the colon and contained foci of air consistent with abscess/infected fluid.  Also noted nonenhancing of the anterior aspect of the spleen suggesting infection/infarction.    Status post surgery on 11/10/2020     Postop hypotension, resolved  Leukocytosis -ongoing, remains elevated     Acute kidney injury- improved   Renal ultrasound-unremarkable  Nephrology following     Bacteremia  -Blood cultures on 11/31/2 + viridans streptococci, possible contaminant  -Blood culture on 11/5 with no growth to date     Perisplenic abscess with possible communication to the bowel  Left upper quadrant abscess  Afebrile  Splenic enhancement on CT, suggesting infection or infarction  -Drainage on 11/4 and peritoneal fluid Enterococcus faecalis, ampicillin sensitive and C albicans  Recent ground-level fall located by a subcapsular splenic hematoma   Status post segmental colectomy, colostomy, irrigation and debridement of flank wound and wound VAC placement on 11/10/2020 by Dr. Desouza.   Patient found to have a left upper quadrant abscess inferior to the spleen with a large bowel wall defect.  Also noted to have an abdominal wall abscess.  Operative cultures (colon perforation) -ampicillin sensitive Enterococcus faecalis and Candida albicans.  Gram stain+ GPC, GPR  -CT abdomen pelvis without on 11/17-hypodensity along anterior aspect of the spleen measuring 4.3 x 2.9 cm.  Parastomal density measuring two-point by 2.1 cm likely representing postsurgical hematoma.  This is decreased in size from prior.  Small amount of free fluid in the abdomen pelvis.     Type II diabetes mellitus  Keep BS under 150 to help control current infection     A. Fib  Contributing to hypotension  Cards following      Diarrhea  -C. difficile tested negative on 11/4     Antibiotic allergies: Daptomycin rash, penicillins rash and hives, tolerates cephalosporins-discussed allergies and he states that he has developed significant rash and hives with penicillins several times, most recently a year and a half ago- will avoid penicillins     Nausea and poor appetite-potentially related to linezolid      Plan:      ---  Continue vancomycin to treat the Enterococcus, continue ceftriaxone to provide gram-negative coverage and okay to transition Flagyl to IV as he has nausea and to continue anaerobic coverage -antibiotic selection is limited due to his numerous allergies il  --- Continue fluconazole 400 mg daily  for the Candida  --- Monitor CBC and CMP   --- Avoid nephrotoxins and renally dose medication  --- Repeat CT abdomen and pelvis with contrast preferably 1 week from prior and then can likely give final recommendations  --- Monitor drain output, still significant.  Drain management per surgery      ID will follow.

## 2020-11-20 NOTE — PROGRESS NOTES
Pt report received from day shift RN Roxy. He is A and O x 4 at this time resting in bed. He has no complaints of pain. POC reviewed and questions answered. Bed is locked and in low position. Call light is within reach.

## 2020-11-20 NOTE — PROGRESS NOTES
[facility]   Acute care  Surgery    Persistent nausea  Impeding oral intake   Likely related to flagyl   Consider changing to clindamycin     Ongoing elevated wbc with abdomen improving clinically and radiographically   BETTE drainage increasing again   Fluid is quite clear  Though an orange color today likely related to medication   Likely just ascitc fluid though reluctant to pull with ongoing leukocytosis

## 2020-11-20 NOTE — NON-PROVIDER
Daily Progress Note:     Date of Service: 11/20/2020  Primary Team: [facility] Red Team  and [facility] Yellow Team   Attending: JONATHAN Myrick M.D.   Senior Resident: Dr. Peace  Medical Student: Layo Harp, Student  Contact:  [phone]    Reason for interval visit:   Intraabdominal abscess with perforated splenic flexure of the colon and afib with RVR    ID:  Patient is a 55 y.o. male who underwent a colectomy and ostomy placement for an intraabdominal abscess with perforated splenic flexure with a PMH of afib with RVR, DM2, hyperlipidemia, schizophrenia, depression, and CHF.     Interval update:   Patient is currently still recovering with worsening symptoms of SOB, N/V, and chills.  He is still afebrile, but is tachycardic and hypertensive.  His WBC is still elevated at 17.2.  He expresses that he is depressed and has fears of not surviving his hospital stay, leading him to have suicidal thoughts without a plan.  His auditory hallucinations have improved since yesterday.    Consultants/Specialty:  ID  Surgery  Cardio  Nephro    Review of Systems:    Review of Systems   Constitutional: Positive for chills and malaise/fatigue. Negative for fever.   HENT: Negative for congestion, hearing loss and sore throat.    Eyes: Negative for blurred vision and double vision.   Respiratory: Positive for shortness of breath (Worse than yesterday). Negative for cough.    Cardiovascular: Negative for chest pain.   Gastrointestinal: Positive for abdominal pain (from ostomy site ), nausea and vomiting.   Musculoskeletal: Negative for myalgias.   Neurological: Negative for headaches.   Psychiatric/Behavioral: Positive for depression, hallucinations (auditory, improved from yesterday) and suicidal ideas (does not have plan). The patient is nervous/anxious.        Objective:   Physical Exam:   Vitals:   Temp:  [36.4 °C (97.6 °F)-37.1 °C (98.7 °F)] 36.6 °C (97.9 °F)  Pulse:  [] 111  Resp:  [16-19] 16  BP:  (129-164)/() 157/92  SpO2:  [95 %-99 %] 96 %    Physical Exam  Constitutional:       Appearance: He is obese.   HENT:      Head: Normocephalic and atraumatic.      Right Ear: External ear normal.      Left Ear: External ear normal.      Nose: Nose normal.      Mouth/Throat:      Mouth: Mucous membranes are dry.   Eyes:      Pupils: Pupils are equal, round, and reactive to light.   Cardiovascular:      Rate and Rhythm: Normal rate and regular rhythm.      Heart sounds: Normal heart sounds.   Abdominal:      General: Abdomen is flat. There is no distension.      Palpations: Abdomen is soft.   Skin:     General: Skin is warm and dry.   Neurological:      General: No focal deficit present.      Mental Status: He is alert.   Psychiatric:         Behavior: Behavior normal.      Comments: Patient expresses anxiety, depression, and thoughts of suicide without a plan              Labs:   Recent Labs     11/18/20 0320 11/19/20 0315 11/20/20 0313   WBC 18.1* 15.4* 17.2*   RBC 4.22* 4.18* 4.15*   HEMOGLOBIN 10.9* 10.4* 10.6*   HEMATOCRIT 35.2* 35.1* 35.5*   MCV 83.4 84.0 85.5   MCH 25.8* 24.9* 25.5*   RDW 53.3* 54.1* 54.6*   PLATELETCT 241 214 200   MPV 10.1 10.0 9.7   NEUTSPOLYS 75.30* 70.10 69.60   LYMPHOCYTES 11.60* 15.20* 15.60*   MONOCYTES 6.40 7.70 9.30   EOSINOPHILS 4.50 4.60 3.10   BASOPHILS 0.20 0.30 0.20   RBCMORPHOLO  --  Present  --      Recent Labs     11/18/20 0320 11/19/20 0315 11/20/20 0313   SODIUM 139 138 139   POTASSIUM 3.7 3.8 3.7   CHLORIDE 98 99 97   CO2 35* 36* 37*   GLUCOSE 119* 105* 150*   BUN 19 17 15     Recent Labs     11/18/20 0320 11/19/20 0315 11/20/20 0313   ALBUMIN 2.4* 2.4* 2.6*   TBILIRUBIN 0.7 0.6 0.6   ALKPHOSPHAT 66 68 79   TOTPROTEIN 5.0* 5.1* 5.4*   ALTSGPT 7 8 <5   ASTSGOT 14 15 16   CREATININE 0.76 0.66 0.58       Imaging:   CT-ABDOMEN-PELVIS W/O   Final Result      Postsurgical changes with a left abdominal stoma. Parastomal densities may be related to postsurgical  hematoma measuring up to 2.8 x 2.1 cm.      Small amount of free fluid in the abdomen and pelvis.      Collection along the anterior spleen measures approximately 2.9 x 4.3 cm is decreased in size compared to the prior examination. Evaluation is limited by lack of intravenous contrast.      Third spacing.      Trace free intraperitoneal air is likely related to recent surgery.      Moderate left and small right pleural effusions with overlying atelectasis/consolidation.      Cortical scarring of the right kidney.      Cholelithiasis.         DX-CHEST-PORTABLE (1 VIEW)   Final Result      1.  Bilateral airspace and interstitial infiltrates.      2.  Cardiomegaly.      US-RENAL   Final Result    Examination was limited due to poor sound penetration related to body habitus.   Grossly normal appearance of the kidneys.      EC-ECHOCARDIOGRAM COMPLETE W/ CONT   Final Result      DX-CHEST-PORTABLE (1 VIEW)   Final Result      No pneumothorax identified following right subclavian line placement      Worsening atelectasis with consolidation at the bases not excluded      Stable cardiac silhouette enlargement      CT-ABDOMEN-PELVIS WITH   Final Result      Interval decrease in size of perisplenic abscess. Pigtail catheter now in place      Abscess has likely fistulous communication with the abutting splenic flexure of the colon. Colon is otherwise normal in appearance      Mild hepatosplenomegaly      Decreasing small left pleural effusion      Subacute anterior wedge compression fractures in the caudal thoracic spine      CT-DRAIN-PERITONEAL   Final Result      1.  CT guided perisplenic abscess catheter drainage.   2.  The current plan is to obtain a follow-up CT in 5-7 days..      OUTSIDE IMAGES-CT CHEST   Final Result      OUTSIDE IMAGES-CT ABDOMEN /PELVIS   Final Result      CT-ABDOMEN-PELVIS WITH   Final Result   Addendum 1 of 1   Addendum:   A prior noncontrast CT of the abdomen and pelvis from [facility] dated 10/19/2020 was made available for comparison. On the    prior study a perisplenic/subcapsular fluid collection was seen however    did not contain air at that time and    there was no discrete evidence of communication with the transverse colon.    Communication with splenic flexure of the colon and air within the    collection are new from the prior study.      Final      DX-CHEST-PORTABLE (1 VIEW)   Final Result      Slight interval interval improvement in congestive heart failure.      [id number] VIEW    (Results Pending)       Assessment and Plan:    #A-fib with RVR  Patient was not a candidate for anticoagulation due to frequent falls from orthostatic hypotension.  Digoxin was started but is no longer to be used to due to ongoing toxicity. Patient was tachycardic on 11/18 morning leading to increase of metoprolol dose from 50 per day to 50 BID.  Patient's HR in the 110s-130s.     -metoprolol increase from 50 BID to 75 BID  -started amiodarone 200mg  -metoprolol PRN available if needed  -Continue on tele for observation     #Suicidal ideation  Patient disclosed to me that he has been feeling suicidal on and off.  He does not have a plan to commit suicide, but he describes his feeling of hopelessness due to the hospital stay and reports wanting to just die sometimes.     -Sertraline dose increased to 100mg, with caution due to QTC prolongation  -Psych consult    #Post op for perforation of colon with colectomy and ostomy  Patient's WBC remains elevated at 17.2, he continues to have chills, SOB, nausea and vomiting.  Increase in WBC may be due to ongoing infection vs stress. Patient has been afebrile for the last 72 hours.  Enterococcus faecalis and candida albicans grew from surgical culture.     -IV ceftriaxone, vancomycin, metronidazole and fluconazole continued  -Repeat CBC next AM     #Left Ventricular heart failure  Patient has left ventricular ejection fraction estimated to be 35%  with normal size. Patient sustained SRUTHI leading to discontinuation of lisinopril and spironolactone, but now is back on these medications.     -Patient currently taking metoprolol, lisinopril, spirolactone     #Schizophrenia  Patient has a PMH of schizophrenia before admission for which he takes olanzapine.  QTC prolongation indicated lowering of his olanzipine dose.  Patient has had increased auditory hallucinations lately that degrade him and negatively impact his hospital stay by lowering his self esteem.  Patient's auditory hallucination symptoms improving after dose set back to normal.    -olanzipine 7.5mg with caution for QTC prolongation     #Type 2 DM  Patient's blood glucose is well controlled with ranges under 150 per ID, blood glucose has been controlled under in the 100s.     -Continue current sliding scale insulin regimen  -BG checks every 6 hours     #QTC prolongation  Patient was found to have a QTC prolongation of 535 on 11/17.  Cause may be due to multiple medications including sertraline, olanzapine, fluconazole, linezolid and zofran.  Risk of Torsades. Now patient has QTC of 424 on 11/18 which is back to baseline. Zofran was d/c, linezolid switched to vancomycin, sertraline and olanzapine doses were lowered. Problem resolved for now.     -olanzapine dose increased due to return of schizophrenic symptoms and sertraline dose increased due to suicidal thoughts, patient's QTC should be observed for return      #Digoxin Toxicity   Patient was started on digoxin for CHF with normal levels of 1.5 on 11/14.  Patient began to have worsening nausea and developed vomiting and hallucinating yellow shapes beginning 11/16.  He was found to have a digoxin level >5 and digoxin was discontinued.  His levels lowered to 0.8 (11/17) .  The increase in levels may have been due to concurrent usage of fluconazole a [id number] inhibitor. Patient no longer is having hallucinations related to digoxin toxicity.     -Patient's  digoxin toxicity resolved

## 2020-11-21 PROBLEM — E03.9 HYPOTHYROIDISM: Status: ACTIVE | Noted: 2020-11-21

## 2020-11-21 LAB
ALBUMIN SERPL BCP-MCNC: 2.5 G/DL (ref 3.2–4.9)
ALBUMIN/GLOB SERPL: 0.8 G/DL
ALP SERPL-CCNC: 77 U/L (ref 30–99)
ALT SERPL-CCNC: 7 U/L (ref 2–50)
ANION GAP SERPL CALC-SCNC: 5 MMOL/L (ref 7–16)
AST SERPL-CCNC: 13 U/L (ref 12–45)
BASOPHILS # BLD AUTO: 0.2 % (ref 0–1.8)
BASOPHILS # BLD: 0.03 K/UL (ref 0–0.12)
BILIRUB SERPL-MCNC: 0.7 MG/DL (ref 0.1–1.5)
BUN SERPL-MCNC: 12 MG/DL (ref 8–22)
CALCIUM SERPL-MCNC: 7.5 MG/DL (ref 8.5–10.5)
CHLORIDE SERPL-SCNC: 96 MMOL/L (ref 96–112)
CO2 SERPL-SCNC: 36 MMOL/L (ref 20–33)
CREAT SERPL-MCNC: 0.46 MG/DL (ref 0.5–1.4)
EOSINOPHIL # BLD AUTO: 0.4 K/UL (ref 0–0.51)
EOSINOPHIL NFR BLD: 2.4 % (ref 0–6.9)
ERYTHROCYTE [DISTWIDTH] IN BLOOD BY AUTOMATED COUNT: 54.5 FL (ref 35.9–50)
GLOBULIN SER CALC-MCNC: 3 G/DL (ref 1.9–3.5)
GLUCOSE BLD-MCNC: 111 MG/DL (ref 65–99)
GLUCOSE BLD-MCNC: 112 MG/DL (ref 65–99)
GLUCOSE BLD-MCNC: 124 MG/DL (ref 65–99)
GLUCOSE BLD-MCNC: 125 MG/DL (ref 65–99)
GLUCOSE BLD-MCNC: 134 MG/DL (ref 65–99)
GLUCOSE SERPL-MCNC: 123 MG/DL (ref 65–99)
HCT VFR BLD AUTO: 34.1 % (ref 42–52)
HGB BLD-MCNC: 10.4 G/DL (ref 14–18)
IMM GRANULOCYTES # BLD AUTO: 0.31 K/UL (ref 0–0.11)
IMM GRANULOCYTES NFR BLD AUTO: 1.9 % (ref 0–0.9)
LYMPHOCYTES # BLD AUTO: 2.32 K/UL (ref 1–4.8)
LYMPHOCYTES NFR BLD: 14 % (ref 22–41)
MAGNESIUM SERPL-MCNC: 1.4 MG/DL (ref 1.5–2.5)
MAGNESIUM SERPL-MCNC: 1.7 MG/DL (ref 1.5–2.5)
MCH RBC QN AUTO: 25.7 PG (ref 27–33)
MCHC RBC AUTO-ENTMCNC: 30.5 G/DL (ref 33.7–35.3)
MCV RBC AUTO: 84.4 FL (ref 81.4–97.8)
MONOCYTES # BLD AUTO: 1.77 K/UL (ref 0–0.85)
MONOCYTES NFR BLD AUTO: 10.7 % (ref 0–13.4)
NEUTROPHILS # BLD AUTO: 11.7 K/UL (ref 1.82–7.42)
NEUTROPHILS NFR BLD: 70.8 % (ref 44–72)
NRBC # BLD AUTO: 0.02 K/UL
NRBC BLD-RTO: 0.1 /100 WBC
PLATELET # BLD AUTO: 189 K/UL (ref 164–446)
PMV BLD AUTO: 9.4 FL (ref 9–12.9)
POTASSIUM SERPL-SCNC: 3.5 MMOL/L (ref 3.6–5.5)
PROT SERPL-MCNC: 5.5 G/DL (ref 6–8.2)
RBC # BLD AUTO: 4.04 M/UL (ref 4.7–6.1)
SODIUM SERPL-SCNC: 137 MMOL/L (ref 135–145)
WBC # BLD AUTO: 16.5 K/UL (ref 4.8–10.8)

## 2020-11-21 PROCEDURE — 83735 ASSAY OF MAGNESIUM: CPT | Mod: 91

## 2020-11-21 PROCEDURE — 700102 HCHG RX REV CODE 250 W/ 637 OVERRIDE(OP)

## 2020-11-21 PROCEDURE — 700102 HCHG RX REV CODE 250 W/ 637 OVERRIDE(OP): Performed by: STUDENT IN AN ORGANIZED HEALTH CARE EDUCATION/TRAINING PROGRAM

## 2020-11-21 PROCEDURE — 700111 HCHG RX REV CODE 636 W/ 250 OVERRIDE (IP)

## 2020-11-21 PROCEDURE — 700102 HCHG RX REV CODE 250 W/ 637 OVERRIDE(OP): Performed by: PSYCHIATRY & NEUROLOGY

## 2020-11-21 PROCEDURE — 700111 HCHG RX REV CODE 636 W/ 250 OVERRIDE (IP): Performed by: INTERNAL MEDICINE

## 2020-11-21 PROCEDURE — 770020 HCHG ROOM/CARE - TELE (206)

## 2020-11-21 PROCEDURE — 700105 HCHG RX REV CODE 258: Performed by: INTERNAL MEDICINE

## 2020-11-21 PROCEDURE — 80053 COMPREHEN METABOLIC PANEL: CPT

## 2020-11-21 PROCEDURE — 82962 GLUCOSE BLOOD TEST: CPT | Mod: 91

## 2020-11-21 PROCEDURE — A9270 NON-COVERED ITEM OR SERVICE: HCPCS | Performed by: STUDENT IN AN ORGANIZED HEALTH CARE EDUCATION/TRAINING PROGRAM

## 2020-11-21 PROCEDURE — 700105 HCHG RX REV CODE 258: Performed by: HOSPITALIST

## 2020-11-21 PROCEDURE — 85025 COMPLETE CBC W/AUTO DIFF WBC: CPT

## 2020-11-21 PROCEDURE — 700111 HCHG RX REV CODE 636 W/ 250 OVERRIDE (IP): Performed by: HOSPITALIST

## 2020-11-21 PROCEDURE — A9270 NON-COVERED ITEM OR SERVICE: HCPCS | Performed by: PSYCHIATRY & NEUROLOGY

## 2020-11-21 PROCEDURE — 700101 HCHG RX REV CODE 250: Performed by: STUDENT IN AN ORGANIZED HEALTH CARE EDUCATION/TRAINING PROGRAM

## 2020-11-21 PROCEDURE — 700111 HCHG RX REV CODE 636 W/ 250 OVERRIDE (IP): Performed by: NURSE PRACTITIONER

## 2020-11-21 PROCEDURE — A9270 NON-COVERED ITEM OR SERVICE: HCPCS

## 2020-11-21 PROCEDURE — 700111 HCHG RX REV CODE 636 W/ 250 OVERRIDE (IP): Performed by: STUDENT IN AN ORGANIZED HEALTH CARE EDUCATION/TRAINING PROGRAM

## 2020-11-21 PROCEDURE — 99232 SBSQ HOSP IP/OBS MODERATE 35: CPT | Mod: GC | Performed by: HOSPITALIST

## 2020-11-21 RX ORDER — LISINOPRIL 20 MG/1
TABLET ORAL
Status: COMPLETED
Start: 2020-11-21 | End: 2020-11-21

## 2020-11-21 RX ORDER — CLINDAMYCIN PHOSPHATE 600 MG/50ML
600 INJECTION, SOLUTION INTRAVENOUS EVERY 8 HOURS
Status: DISCONTINUED | OUTPATIENT
Start: 2020-11-21 | End: 2020-11-21

## 2020-11-21 RX ORDER — FAMOTIDINE 20 MG/1
TABLET, FILM COATED ORAL
Status: COMPLETED
Start: 2020-11-21 | End: 2020-11-21

## 2020-11-21 RX ORDER — AMIODARONE HYDROCHLORIDE 200 MG/1
TABLET ORAL
Status: COMPLETED
Start: 2020-11-21 | End: 2020-11-21

## 2020-11-21 RX ORDER — CEFTRIAXONE 2 G/1
INJECTION, POWDER, FOR SOLUTION INTRAMUSCULAR; INTRAVENOUS
Status: ACTIVE
Start: 2020-11-21 | End: 2020-11-21

## 2020-11-21 RX ORDER — MAGNESIUM SULFATE HEPTAHYDRATE 40 MG/ML
4 INJECTION, SOLUTION INTRAVENOUS ONCE
Status: COMPLETED | OUTPATIENT
Start: 2020-11-21 | End: 2020-11-21

## 2020-11-21 RX ORDER — HYDROCODONE BITARTRATE AND ACETAMINOPHEN 10; 325 MG/1; MG/1
1 TABLET ORAL EVERY 6 HOURS PRN
Status: DISCONTINUED | OUTPATIENT
Start: 2020-11-21 | End: 2020-12-03

## 2020-11-21 RX ORDER — CETIRIZINE HYDROCHLORIDE 10 MG/1
TABLET ORAL
Status: COMPLETED
Start: 2020-11-21 | End: 2020-11-21

## 2020-11-21 RX ORDER — METOCLOPRAMIDE HYDROCHLORIDE 5 MG/ML
10 INJECTION INTRAMUSCULAR; INTRAVENOUS EVERY 6 HOURS PRN
Status: DISCONTINUED | OUTPATIENT
Start: 2020-11-21 | End: 2020-11-23

## 2020-11-21 RX ORDER — METOPROLOL SUCCINATE 50 MG/1
TABLET, EXTENDED RELEASE ORAL
Status: ACTIVE
Start: 2020-11-21 | End: 2020-11-21

## 2020-11-21 RX ORDER — METOPROLOL SUCCINATE 25 MG/1
TABLET, EXTENDED RELEASE ORAL
Status: ACTIVE
Start: 2020-11-21 | End: 2020-11-21

## 2020-11-21 RX ORDER — SPIRONOLACTONE 25 MG/1
TABLET ORAL
Status: COMPLETED
Start: 2020-11-21 | End: 2020-11-21

## 2020-11-21 RX ORDER — FLUCONAZOLE 100 MG/1
TABLET ORAL
Status: COMPLETED
Start: 2020-11-21 | End: 2020-11-21

## 2020-11-21 RX ORDER — LEVOTHYROXINE SODIUM 112 UG/1
112 TABLET ORAL
Status: DISCONTINUED | OUTPATIENT
Start: 2020-11-21 | End: 2020-12-17 | Stop reason: HOSPADM

## 2020-11-21 RX ORDER — POTASSIUM CHLORIDE 20 MEQ/1
40 TABLET, EXTENDED RELEASE ORAL ONCE
Status: COMPLETED | OUTPATIENT
Start: 2020-11-21 | End: 2020-11-21

## 2020-11-21 RX ORDER — PROCHLORPERAZINE EDISYLATE 5 MG/ML
10 INJECTION INTRAMUSCULAR; INTRAVENOUS EVERY 4 HOURS PRN
Status: DISCONTINUED | OUTPATIENT
Start: 2020-11-21 | End: 2020-12-17 | Stop reason: HOSPADM

## 2020-11-21 RX ORDER — MODAFINIL 100 MG/1
100 TABLET ORAL EVERY MORNING
Status: DISCONTINUED | OUTPATIENT
Start: 2020-11-21 | End: 2020-12-17 | Stop reason: HOSPADM

## 2020-11-21 RX ADMIN — CETIRIZINE HYDROCHLORIDE 10 MG: 10 TABLET, FILM COATED ORAL at 05:00

## 2020-11-21 RX ADMIN — ENOXAPARIN SODIUM 40 MG: 40 INJECTION SUBCUTANEOUS at 05:03

## 2020-11-21 RX ADMIN — LISINOPRIL 20 MG: 20 TABLET ORAL at 04:53

## 2020-11-21 RX ADMIN — FLUCONAZOLE 400 MG: 200 TABLET ORAL at 05:01

## 2020-11-21 RX ADMIN — FAMOTIDINE 20 MG: 20 TABLET, FILM COATED ORAL at 04:54

## 2020-11-21 RX ADMIN — AMIODARONE HYDROCHLORIDE 200 MG: 200 TABLET ORAL at 05:01

## 2020-11-21 RX ADMIN — CEFTRIAXONE SODIUM 2 G: 2 INJECTION, POWDER, FOR SOLUTION INTRAMUSCULAR; INTRAVENOUS at 06:00

## 2020-11-21 RX ADMIN — METOCLOPRAMIDE 10 MG: 5 INJECTION, SOLUTION INTRAMUSCULAR; INTRAVENOUS at 10:01

## 2020-11-21 RX ADMIN — MAGNESIUM SULFATE IN WATER 4 G: 40 INJECTION, SOLUTION INTRAVENOUS at 08:13

## 2020-11-21 RX ADMIN — METOPROLOL SUCCINATE 75 MG: 50 TABLET, EXTENDED RELEASE ORAL at 05:00

## 2020-11-21 RX ADMIN — METRONIDAZOLE 500 MG: 500 INJECTION, SOLUTION INTRAVENOUS at 04:51

## 2020-11-21 RX ADMIN — VANCOMYCIN HYDROCHLORIDE 1750 MG: 500 INJECTION, POWDER, LYOPHILIZED, FOR SOLUTION INTRAVENOUS at 05:03

## 2020-11-21 RX ADMIN — SPIRONOLACTONE 25 MG: 25 TABLET ORAL at 05:02

## 2020-11-21 RX ADMIN — TRAZODONE HYDROCHLORIDE 50 MG: 50 TABLET ORAL at 21:41

## 2020-11-21 RX ADMIN — RISPERIDONE 0.5 MG: 0.5 TABLET ORAL at 17:29

## 2020-11-21 RX ADMIN — HYDROCODONE BITARTRATE AND ACETAMINOPHEN 1 TABLET: 10; 325 TABLET ORAL at 17:20

## 2020-11-21 RX ADMIN — PROCHLORPERAZINE EDISYLATE 5 MG: 5 INJECTION INTRAMUSCULAR; INTRAVENOUS at 05:03

## 2020-11-21 RX ADMIN — HYDROCODONE BITARTRATE AND ACETAMINOPHEN 1 TABLET: 10; 325 TABLET ORAL at 23:21

## 2020-11-21 RX ADMIN — MODAFINIL 100 MG: 100 TABLET ORAL at 10:05

## 2020-11-21 RX ADMIN — CLINDAMYCIN IN 5 PERCENT DEXTROSE 600 MG: 12 INJECTION, SOLUTION INTRAVENOUS at 10:01

## 2020-11-21 RX ADMIN — METOPROLOL SUCCINATE 75 MG: 50 TABLET, EXTENDED RELEASE ORAL at 17:29

## 2020-11-21 RX ADMIN — INSULIN GLARGINE 10 UNITS: 100 INJECTION, SOLUTION SUBCUTANEOUS at 05:14

## 2020-11-21 RX ADMIN — FENTANYL CITRATE 25 MCG: 50 INJECTION, SOLUTION INTRAMUSCULAR; INTRAVENOUS at 00:31

## 2020-11-21 RX ADMIN — FAMOTIDINE 20 MG: 20 TABLET ORAL at 04:54

## 2020-11-21 RX ADMIN — SPIRONOLACTONE 25 MG: 50 TABLET ORAL at 05:02

## 2020-11-21 RX ADMIN — FLUCONAZOLE 400 MG: 100 TABLET ORAL at 05:01

## 2020-11-21 RX ADMIN — HYDROCODONE BITARTRATE AND ACETAMINOPHEN 1 TABLET: 10; 325 TABLET ORAL at 10:01

## 2020-11-21 RX ADMIN — LEVOTHYROXINE SODIUM 112 MCG: 112 TABLET ORAL at 08:13

## 2020-11-21 RX ADMIN — POTASSIUM CHLORIDE 40 MEQ: 1500 TABLET, EXTENDED RELEASE ORAL at 08:13

## 2020-11-21 ASSESSMENT — ENCOUNTER SYMPTOMS
HALLUCINATIONS: 1
MYALGIAS: 1
SORE THROAT: 0
BACK PAIN: 1
BRUISES/BLEEDS EASILY: 0
DIARRHEA: 0
ABDOMINAL PAIN: 1
PALPITATIONS: 0
HEADACHES: 0
NAUSEA: 1
DEPRESSION: 1
DIZZINESS: 0
DOUBLE VISION: 0
SHORTNESS OF BREATH: 1
FEVER: 0
VOMITING: 1
WEAKNESS: 1
COUGH: 0
DIAPHORESIS: 1
WHEEZING: 0
HEARTBURN: 0
CHILLS: 1
POLYDIPSIA: 0
BLURRED VISION: 0

## 2020-11-21 ASSESSMENT — PAIN DESCRIPTION - PAIN TYPE: TYPE: SURGICAL PAIN

## 2020-11-21 ASSESSMENT — FIBROSIS 4 INDEX: FIB4 SCORE: 1.48

## 2020-11-21 ASSESSMENT — PAIN SCALES - WONG BAKER: WONGBAKER_NUMERICALRESPONSE: HURTS JUST A LITTLE BIT

## 2020-11-21 NOTE — PROGRESS NOTES
Daily Progress Note:     Date of Service: 11/21/2020  Primary Team: [facility] Yellow Team   Attending: JONATHAN Myrick M.D.   Senior Resident: Dr. Peace    Contact:  [phone]    HPI & Hospital course: 55-year-old male with past medical history of obesity BMI of 53,  type 2 diabetes mellitus,  atrial fibrillation, congestive heart failure EF of 30% 11/2020 schizophrenia with depression had a history of ground-level fall 6 weeks ago CT abdomen showed subcapsular splenic hematoma seen and discharged on oral antibiotic.  Patient presented on 11/3 with history of increasing abdominal pain, CT abdomen at that time showed intra-abdominal abscess admitted for surgery and got surgery done showed perforated splenic flexure and received colectomy on 11/10 with ostomy placement.  Patient had A. fib with RVR started on digoxin by cardiology on 11/14 levels checked 11/14 1.5 and 11/16 >5.  Transfer to telemetry for further management.  Did not receive antibody to digoxin but repeat digoxin level is 0.8.started on metoprolol XL for AF and titrating.    Interval update: Patient still having nausea, vomiting, hallucinations of yellowish discoloration, belly pain.  Patient telemetry showing atrial fibrillation heart rate of   Surgery has assessed yesterday and and given no  new recommendations  Advised to remove drains today.  To change Flagyl to clindamycin due to nausea  Psychiatry assessed him yesterday and discontinued Zoloft and Zyprexa  And started on risperidone and modafinil for his mood and schizophrenia.      Consultants/Specialty:  General surgery  Psychiatric  Infectious diseases    Review of Systems:    Review of Systems   Constitutional: Positive for chills, diaphoresis and malaise/fatigue. Negative for fever.   HENT: Negative for hearing loss and sore throat.    Eyes: Negative for blurred vision and double vision.   Respiratory: Positive for shortness of breath. Negative for cough and wheezing.     Cardiovascular: Negative for chest pain and palpitations.   Gastrointestinal: Positive for abdominal pain, nausea and vomiting. Negative for diarrhea and heartburn.   Genitourinary: Negative for dysuria and urgency.   Musculoskeletal: Positive for back pain and myalgias.   Skin: Negative for itching and rash.   Neurological: Positive for weakness. Negative for dizziness and headaches.   Endo/Heme/Allergies: Negative for polydipsia. Does not bruise/bleed easily.   Psychiatric/Behavioral: Positive for depression, hallucinations and suicidal ideas.       Objective Data:   Physical Exam:   Vitals:   Temp:  [36.1 °C (96.9 °F)-36.6 °C (97.9 °F)] 36.1 °C (96.9 °F)  Pulse:  [] 96  Resp:  [16-18] 18  BP: (137-165)/() 151/101  SpO2:  [91 %-96 %] 96 %    Physical Exam  Vitals signs and nursing note reviewed.   Constitutional:       General: He is not in acute distress.     Appearance: He is obese. He is ill-appearing.   HENT:      Head: Normocephalic and atraumatic.      Right Ear: Tympanic membrane normal.      Left Ear: Tympanic membrane normal.      Nose: Nose normal. No congestion.      Mouth/Throat:      Mouth: Mucous membranes are moist.      Pharynx: Oropharynx is clear. No oropharyngeal exudate.   Eyes:      General: No scleral icterus.     Pupils: Pupils are equal, round, and reactive to light.      Comments: R sided strabismus (pre-existing according to patient   Neck:      Musculoskeletal: Normal range of motion. No neck rigidity.   Cardiovascular:      Rate and Rhythm: Tachycardia present. Rhythm irregular.      Pulses: Normal pulses.      Heart sounds: Normal heart sounds. No murmur.   Pulmonary:      Effort: No respiratory distress.      Breath sounds: Normal breath sounds. No wheezing.   Abdominal:      General: There is no distension.      Palpations: Abdomen is soft.      Tenderness: There is no abdominal tenderness.      Comments: Drains removed.  Colostomy wound clean.   Musculoskeletal:          General: Swelling present.      Right lower leg: Edema present.      Left lower leg: Edema present.   Skin:     General: Skin is warm.      Coloration: Skin is not jaundiced.   Neurological:      General: No focal deficit present.      Mental Status: He is alert and oriented to person, place, and time.      Cranial Nerves: No cranial nerve deficit.   Psychiatric:      Comments: Still depressed.           Labs:   Recent Labs     11/19/20 0315 11/20/20 0313 11/21/20  0208   WBC 15.4* 17.2* 16.5*   RBC 4.18* 4.15* 4.04*   HEMOGLOBIN 10.4* 10.6* 10.4*   HEMATOCRIT 35.1* 35.5* 34.1*   MCV 84.0 85.5 84.4   MCH 24.9* 25.5* 25.7*   RDW 54.1* 54.6* 54.5*   PLATELETCT 214 200 189   MPV 10.0 9.7 9.4   NEUTSPOLYS 70.10 69.60 70.80   LYMPHOCYTES 15.20* 15.60* 14.00*   MONOCYTES 7.70 9.30 10.70   EOSINOPHILS 4.60 3.10 2.40   BASOPHILS 0.30 0.20 0.20   RBCMORPHOLO Present  --   --      Recent Labs     11/19/20 0315 11/20/20  0313 11/21/20  0208   SODIUM 138 139 137   POTASSIUM 3.8 3.7 3.5*   CHLORIDE 99 97 96   CO2 36* 37* 36*   GLUCOSE 105* 150* 123*   BUN 17 15 12     Recent Labs     11/19/20 0315 11/20/20 0313 11/21/20  0208   ALBUMIN 2.4* 2.6* 2.5*   TBILIRUBIN 0.6 0.6 0.7   ALKPHOSPHAT 68 79 77   TOTPROTEIN 5.1* 5.4* 5.5*   ALTSGPT 8 <5 7   ASTSGOT 15 16 13   CREATININE 0.66 0.58 0.46*       Imaging:   [id number] VIEW   Final Result      1.  No evidence of bowel obstruction.   2.  Postoperative changes as described.      CT-ABDOMEN-PELVIS W/O   Final Result      Postsurgical changes with a left abdominal stoma. Parastomal densities may be related to postsurgical hematoma measuring up to 2.8 x 2.1 cm.      Small amount of free fluid in the abdomen and pelvis.      Collection along the anterior spleen measures approximately 2.9 x 4.3 cm is decreased in size compared to the prior examination. Evaluation is limited by lack of intravenous contrast.      Third spacing.      Trace free intraperitoneal air is likely related  to recent surgery.      Moderate left and small right pleural effusions with overlying atelectasis/consolidation.      Cortical scarring of the right kidney.      Cholelithiasis.         DX-CHEST-PORTABLE (1 VIEW)   Final Result      1.  Bilateral airspace and interstitial infiltrates.      2.  Cardiomegaly.      US-RENAL   Final Result    Examination was limited due to poor sound penetration related to body habitus.   Grossly normal appearance of the kidneys.      EC-ECHOCARDIOGRAM COMPLETE W/ CONT   Final Result      DX-CHEST-PORTABLE (1 VIEW)   Final Result      No pneumothorax identified following right subclavian line placement      Worsening atelectasis with consolidation at the bases not excluded      Stable cardiac silhouette enlargement      CT-ABDOMEN-PELVIS WITH   Final Result      Interval decrease in size of perisplenic abscess. Pigtail catheter now in place      Abscess has likely fistulous communication with the abutting splenic flexure of the colon. Colon is otherwise normal in appearance      Mild hepatosplenomegaly      Decreasing small left pleural effusion      Subacute anterior wedge compression fractures in the caudal thoracic spine      CT-DRAIN-PERITONEAL   Final Result      1.  CT guided perisplenic abscess catheter drainage.   2.  The current plan is to obtain a follow-up CT in 5-7 days..      OUTSIDE IMAGES-CT CHEST   Final Result      OUTSIDE IMAGES-CT ABDOMEN /PELVIS   Final Result      CT-ABDOMEN-PELVIS WITH   Final Result   Addendum 1 of 1   Addendum:   A prior noncontrast CT of the abdomen and pelvis from [facility] dated 10/19/2020 was made available for comparison. On the    prior study a perisplenic/subcapsular fluid collection was seen however    did not contain air at that time and    there was no discrete evidence of communication with the transverse colon.    Communication with splenic flexure of the colon and air within the    collection are new from the  prior study.      Final      DX-CHEST-PORTABLE (1 VIEW)   Final Result      Slight interval interval improvement in congestive heart failure.            * Spontaneous perforation of colon (HCC)- (present on admission)  Assessment & Plan  Admitted with LUQ abscess and perforation at splenic flexure   Segmental colectomy, colostomy, Patti pouch, I & D of abdominal wall abscess  Wound cultures: enterococcus and candida  - BETTE draing still.  - DCed Zyvox due to prolonged QTc  - continue Fluconazole(11/14)  - continue vancomycin (11/17)  - continue ceftriaxone  11/17and   - DC Flagyl due to nausea and start on clindamycin(11/21)  - monitor  - Surgeon saw and recommended to remove the drains as they are not draining much.      Schizophrenia (HCC)  Assessment & Plan  Patient with H/O schizophrenia on Olanzapine and sertraline.  Due to prolonged QT decrease the dose of olanzapine and sertraline  QT reduced with discontinuation of linezolid and Zofran also  Patient starting feeling symptoms of hallucinations, and also patient complaining of suicidal ideation but he had no plan.  -Increased olanzapine and sertraline to home dose  - avoid other QTc prolonging meds.  - seen by psychiatry and DCed Zyprexa and zoloft and started on Resperidone and modafinil  - will monitor.    Leucocytosis  Assessment & Plan  Improving      Sepsis (HCC)  Assessment & Plan  Secondary to perforation of colon,  Wound cultures: enterococcus & candida.  Started on Linezolid and Fluconazole. But his QTc is prolonged.  - DCed linezolide and started on vancomycin.  - continue Fluconazole  - DC Zyvox due to prolonged QTc  - continue  vancomycin (11/17)  - continue ceftriaxone 11/17and   - Dced Flagyl on 11/21 due to nausea started on Clindamycin     Elevated digoxin level  Assessment & Plan  H/o N/v and visual hallucinations including yellow halos, circles and squares  AFTER STARTING digoxin and 11/14 digoxin level 1.5 & on 11/17 Repeat digoxin level >  5,   & 0.8  Patient has N/V and hallucinations.  - MONITOR  - Dced DIGOXIN      SRUTHI (acute kidney injury) ([facility])  Assessment & Plan  improving    LV dysfunction- (present on admission)  Assessment & Plan    11/11 Echocardiogram completed with normal left ventricular chamber size.  Moderately reduced left ventricular systolic function.  Left ventricular ejection fraction is visually estimated to be 30-40%  to continue spironolactone 25 qd  -continue  metop 75 mg BD  -continue lisinopril 20 mg qd,     Paroxysmal A-fib with RVR ([facility])- (present on admission)  Assessment & Plan  Seen by cardiac electrophysiologist in 2019 and deemed not to be good candidate for anticoagulation due to frequent falls 2/2 orthostatic hypotension  CHADSVASC of 3 started digoxin by cardiology and on 11/14 checked digoxin level 1.5 & on 11/17 Repeat digoxin level > 5,transferred to tele floor for monitoring, planned to give digoxin Ab but repeat level decreased to 0.8.  Started on metoprolol 50 mg daily, increased dose to BD & monitored on tele.  Rate is controlled.  - continue metop to 75 mg BD  - continue amiodarone 200 mg daily      Hypothyroidism  Assessment & Plan  Obese male   TSH 16.0 and T4 1.14  - start on Levothyroxine 112mcg daily.    Prolonged QT interval  Assessment & Plan  Patient's QTc on admission 477 increased to 535 with Linezolid, zofran, zoloft, olanzapine.  DCed Linezolid, zofran, olanzapine & zoloft  QTc improved from 535 to 425 to 473  Seen by psychiatry for medication recommendations started on resperidone and modafinil.  - avoid QT prolonging medication.    Type 2 diabetes mellitus, without long-term current use of insulin, with peripheral neuropathy ([facility])- (present on admission)  Assessment & Plan  Patient states blood sugar well controlled at home with metformin. Last a1c of 7.8 on 11/10/20   - continue Lantus 10U daily  - Sliding scale insulin with hypoglycemia protocol  - Ensure strict glucose control

## 2020-11-21 NOTE — PROGRESS NOTES
Discussed with Dr tashi LEÓN   Medical condition reviewed   Metronidazole to be changed to clindamycin to see if nausea improves     Drain is basically ascites   Will d/c for nursing benefit and avoid further protein loss   Wbc decreasing today

## 2020-11-21 NOTE — PROGRESS NOTES
Pharmacy Kinetics 55 y.o. male on vancomycin day # 5  2020    Currently on Vancomycin 1750 mg iv q24hr  Provider specified end date: per ID     Indication for Treatment: IAI/abscess     Pertinent history per medical record: Admitted on 11/3/2020 for concern for abdominal pain after procedure found to have colon perforation. ID and surgery consulted.     Other antibiotics: ceftriaxone 2 g iv q 24 hrs, Clinda 600 mg iv q 8 hrs, Fluconazole 400 mg PO q 24 hrs      Allergies: Daptomycin, Pcn [penicillins], Unasyn [ampicillin-sulbactam sodium], and Vancomycin      List concerns for renal function: BUN/SCr ratio > 20:1, obesity (BMI 53), low albumin     Pertinent cultures to date:   11/10/20: COLON PERFORATION = Enterococcus faecalis and candida albicans     MRSA nares swab if pneumonia is a concern (ordered/positive/negative/n-a): n/a    Recent Labs     20  03120  0313 20  0208   WBC 15.4* 17.2* 16.5*   NEUTSPOLYS 70.10 69.60 70.80     Recent Labs     20  0315 20  0313 20  0208   BUN 17 15 12   CREATININE 0.66 0.58 0.46*   ALBUMIN 2.4* 2.6* 2.5*     Recent Labs     20  0315   VANCOTROUGH 18.4       Intake/Output Summary (Last 24 hours) at 2020 1337  Last data filed at 2020 0909  Gross per 24 hour   Intake 120 ml   Output 1280 ml   Net -1160 ml      /77   Pulse 82   Temp 36.1 °C (97 °F) (Temporal)   Resp 18   Ht 1.829 m (6')   Wt (!) 191.5 kg (422 lb 2.9 oz)   SpO2 96%  Temp (24hrs), Av.2 °C (97.1 °F), Min:36.1 °C (96.9 °F), Max:36.2 °C (97.2 °F)      A/P   1. Vancomycin dose change: No   2. Next vancomycin level: 0530 on   3. Goal trough: 10-15 mcg/ml  4. Comments: Vanco dose decreased on  for elevated vanco trough level. Will check a vanco trough level tomorrow to make sure the it is in goal range. Flagyl felt to be causing N/V so it was changed to Clinda. Per Surgery drain output is ascites, drain to be dc'd. ID following.    Noah  Ayush, PharmD

## 2020-11-21 NOTE — PROGRESS NOTES
Pt report received from day shift RN Shirlene. He is A and 0 x 4 at this time. He denies pain at this time. POC reviewed. Bed is locked and in low position. Call light within reach.

## 2020-11-22 LAB
ALBUMIN SERPL BCP-MCNC: 2.6 G/DL (ref 3.2–4.9)
ALBUMIN/GLOB SERPL: 1 G/DL
ALP SERPL-CCNC: 81 U/L (ref 30–99)
ALT SERPL-CCNC: 8 U/L (ref 2–50)
ANION GAP SERPL CALC-SCNC: 3 MMOL/L (ref 7–16)
AST SERPL-CCNC: 16 U/L (ref 12–45)
BASOPHILS # BLD AUTO: 0.1 % (ref 0–1.8)
BASOPHILS # BLD: 0.02 K/UL (ref 0–0.12)
BILIRUB SERPL-MCNC: 0.6 MG/DL (ref 0.1–1.5)
BUN SERPL-MCNC: 13 MG/DL (ref 8–22)
CALCIUM SERPL-MCNC: 7.6 MG/DL (ref 8.5–10.5)
CHLORIDE SERPL-SCNC: 97 MMOL/L (ref 96–112)
CO2 SERPL-SCNC: 37 MMOL/L (ref 20–33)
CREAT SERPL-MCNC: 0.61 MG/DL (ref 0.5–1.4)
EOSINOPHIL # BLD AUTO: 0.37 K/UL (ref 0–0.51)
EOSINOPHIL NFR BLD: 2.5 % (ref 0–6.9)
ERYTHROCYTE [DISTWIDTH] IN BLOOD BY AUTOMATED COUNT: 54.4 FL (ref 35.9–50)
GLOBULIN SER CALC-MCNC: 2.5 G/DL (ref 1.9–3.5)
GLUCOSE BLD-MCNC: 108 MG/DL (ref 65–99)
GLUCOSE BLD-MCNC: 115 MG/DL (ref 65–99)
GLUCOSE BLD-MCNC: 116 MG/DL (ref 65–99)
GLUCOSE BLD-MCNC: 86 MG/DL (ref 65–99)
GLUCOSE SERPL-MCNC: 98 MG/DL (ref 65–99)
HCT VFR BLD AUTO: 32.5 % (ref 42–52)
HGB BLD-MCNC: 9.6 G/DL (ref 14–18)
IMM GRANULOCYTES # BLD AUTO: 0.26 K/UL (ref 0–0.11)
IMM GRANULOCYTES NFR BLD AUTO: 1.7 % (ref 0–0.9)
LYMPHOCYTES # BLD AUTO: 2.67 K/UL (ref 1–4.8)
LYMPHOCYTES NFR BLD: 17.7 % (ref 22–41)
MAGNESIUM SERPL-MCNC: 1.6 MG/DL (ref 1.5–2.5)
MCH RBC QN AUTO: 25.1 PG (ref 27–33)
MCHC RBC AUTO-ENTMCNC: 29.5 G/DL (ref 33.7–35.3)
MCV RBC AUTO: 84.9 FL (ref 81.4–97.8)
MONOCYTES # BLD AUTO: 1.61 K/UL (ref 0–0.85)
MONOCYTES NFR BLD AUTO: 10.7 % (ref 0–13.4)
NEUTROPHILS # BLD AUTO: 10.13 K/UL (ref 1.82–7.42)
NEUTROPHILS NFR BLD: 67.3 % (ref 44–72)
NRBC # BLD AUTO: 0 K/UL
NRBC BLD-RTO: 0 /100 WBC
PHOSPHATE SERPL-MCNC: 2.2 MG/DL (ref 2.5–4.5)
PLATELET # BLD AUTO: 182 K/UL (ref 164–446)
PMV BLD AUTO: 9.8 FL (ref 9–12.9)
POTASSIUM SERPL-SCNC: 3.9 MMOL/L (ref 3.6–5.5)
PROT SERPL-MCNC: 5.1 G/DL (ref 6–8.2)
RBC # BLD AUTO: 3.83 M/UL (ref 4.7–6.1)
SODIUM SERPL-SCNC: 137 MMOL/L (ref 135–145)
VANCOMYCIN TROUGH SERPL-MCNC: 14 UG/ML (ref 10–20)
WBC # BLD AUTO: 15.1 K/UL (ref 4.8–10.8)

## 2020-11-22 PROCEDURE — 84100 ASSAY OF PHOSPHORUS: CPT

## 2020-11-22 PROCEDURE — 700111 HCHG RX REV CODE 636 W/ 250 OVERRIDE (IP): Performed by: HOSPITALIST

## 2020-11-22 PROCEDURE — A9270 NON-COVERED ITEM OR SERVICE: HCPCS

## 2020-11-22 PROCEDURE — 700105 HCHG RX REV CODE 258: Performed by: INTERNAL MEDICINE

## 2020-11-22 PROCEDURE — 700111 HCHG RX REV CODE 636 W/ 250 OVERRIDE (IP): Performed by: INTERNAL MEDICINE

## 2020-11-22 PROCEDURE — 80053 COMPREHEN METABOLIC PANEL: CPT

## 2020-11-22 PROCEDURE — 700111 HCHG RX REV CODE 636 W/ 250 OVERRIDE (IP): Performed by: STUDENT IN AN ORGANIZED HEALTH CARE EDUCATION/TRAINING PROGRAM

## 2020-11-22 PROCEDURE — 99232 SBSQ HOSP IP/OBS MODERATE 35: CPT | Mod: GC | Performed by: HOSPITALIST

## 2020-11-22 PROCEDURE — 80202 ASSAY OF VANCOMYCIN: CPT

## 2020-11-22 PROCEDURE — A9270 NON-COVERED ITEM OR SERVICE: HCPCS | Performed by: STUDENT IN AN ORGANIZED HEALTH CARE EDUCATION/TRAINING PROGRAM

## 2020-11-22 PROCEDURE — 770020 HCHG ROOM/CARE - TELE (206)

## 2020-11-22 PROCEDURE — 700105 HCHG RX REV CODE 258: Performed by: HOSPITALIST

## 2020-11-22 PROCEDURE — 85025 COMPLETE CBC W/AUTO DIFF WBC: CPT

## 2020-11-22 PROCEDURE — A9270 NON-COVERED ITEM OR SERVICE: HCPCS | Performed by: PSYCHIATRY & NEUROLOGY

## 2020-11-22 PROCEDURE — 82962 GLUCOSE BLOOD TEST: CPT | Mod: 91

## 2020-11-22 PROCEDURE — 83735 ASSAY OF MAGNESIUM: CPT

## 2020-11-22 PROCEDURE — 99232 SBSQ HOSP IP/OBS MODERATE 35: CPT | Performed by: INTERNAL MEDICINE

## 2020-11-22 PROCEDURE — 700111 HCHG RX REV CODE 636 W/ 250 OVERRIDE (IP)

## 2020-11-22 PROCEDURE — 700102 HCHG RX REV CODE 250 W/ 637 OVERRIDE(OP): Performed by: STUDENT IN AN ORGANIZED HEALTH CARE EDUCATION/TRAINING PROGRAM

## 2020-11-22 PROCEDURE — 700102 HCHG RX REV CODE 250 W/ 637 OVERRIDE(OP): Performed by: PSYCHIATRY & NEUROLOGY

## 2020-11-22 PROCEDURE — 700102 HCHG RX REV CODE 250 W/ 637 OVERRIDE(OP)

## 2020-11-22 RX ORDER — METOPROLOL SUCCINATE 50 MG/1
TABLET, EXTENDED RELEASE ORAL
Status: ACTIVE
Start: 2020-11-22 | End: 2020-11-22

## 2020-11-22 RX ORDER — AMIODARONE HYDROCHLORIDE 200 MG/1
TABLET ORAL
Status: COMPLETED
Start: 2020-11-22 | End: 2020-11-22

## 2020-11-22 RX ORDER — MODAFINIL 100 MG/1
TABLET ORAL
Status: COMPLETED
Start: 2020-11-22 | End: 2020-11-22

## 2020-11-22 RX ORDER — FLUCONAZOLE 100 MG/1
TABLET ORAL
Status: COMPLETED
Start: 2020-11-22 | End: 2020-11-22

## 2020-11-22 RX ORDER — SPIRONOLACTONE 25 MG/1
TABLET ORAL
Status: ACTIVE
Start: 2020-11-22 | End: 2020-11-22

## 2020-11-22 RX ORDER — MAGNESIUM SULFATE HEPTAHYDRATE 40 MG/ML
2 INJECTION, SOLUTION INTRAVENOUS ONCE
Status: COMPLETED | OUTPATIENT
Start: 2020-11-22 | End: 2020-11-22

## 2020-11-22 RX ORDER — SODIUM CHLORIDE 9 MG/ML
INJECTION, SOLUTION INTRAVENOUS
Status: ACTIVE
Start: 2020-11-22 | End: 2020-11-22

## 2020-11-22 RX ORDER — LISINOPRIL 20 MG/1
TABLET ORAL
Status: COMPLETED
Start: 2020-11-22 | End: 2020-11-22

## 2020-11-22 RX ORDER — METOPROLOL SUCCINATE 25 MG/1
TABLET, EXTENDED RELEASE ORAL
Status: ACTIVE
Start: 2020-11-22 | End: 2020-11-22

## 2020-11-22 RX ORDER — CETIRIZINE HYDROCHLORIDE 10 MG/1
TABLET ORAL
Status: ACTIVE
Start: 2020-11-22 | End: 2020-11-22

## 2020-11-22 RX ORDER — FAMOTIDINE 20 MG/1
TABLET, FILM COATED ORAL
Status: COMPLETED
Start: 2020-11-22 | End: 2020-11-22

## 2020-11-22 RX ORDER — CEFTRIAXONE 2 G/1
INJECTION, POWDER, FOR SOLUTION INTRAMUSCULAR; INTRAVENOUS
Status: ACTIVE
Start: 2020-11-22 | End: 2020-11-22

## 2020-11-22 RX ORDER — LEVOTHYROXINE SODIUM 112 UG/1
TABLET ORAL
Status: COMPLETED
Start: 2020-11-22 | End: 2020-11-22

## 2020-11-22 RX ADMIN — HYDROCODONE BITARTRATE AND ACETAMINOPHEN 1 TABLET: 10; 325 TABLET ORAL at 21:34

## 2020-11-22 RX ADMIN — METOPROLOL SUCCINATE 75 MG: 50 TABLET, EXTENDED RELEASE ORAL at 18:03

## 2020-11-22 RX ADMIN — CETIRIZINE HYDROCHLORIDE 10 MG: 10 TABLET, FILM COATED ORAL at 04:21

## 2020-11-22 RX ADMIN — ENOXAPARIN SODIUM 40 MG: 40 INJECTION SUBCUTANEOUS at 04:14

## 2020-11-22 RX ADMIN — VANCOMYCIN HYDROCHLORIDE 1750 MG: 500 INJECTION, POWDER, LYOPHILIZED, FOR SOLUTION INTRAVENOUS at 04:13

## 2020-11-22 RX ADMIN — Medication 400 MG: at 08:38

## 2020-11-22 RX ADMIN — Medication 400 MG: at 17:59

## 2020-11-22 RX ADMIN — INSULIN GLARGINE 10 UNITS: 100 INJECTION, SOLUTION SUBCUTANEOUS at 04:35

## 2020-11-22 RX ADMIN — AMIODARONE HYDROCHLORIDE 200 MG: 200 TABLET ORAL at 04:23

## 2020-11-22 RX ADMIN — FLUCONAZOLE 400 MG: 100 TABLET ORAL at 04:20

## 2020-11-22 RX ADMIN — ENOXAPARIN SODIUM 60 MG: 60 INJECTION SUBCUTANEOUS at 17:58

## 2020-11-22 RX ADMIN — MODAFINIL 100 MG: 100 TABLET ORAL at 04:21

## 2020-11-22 RX ADMIN — LISINOPRIL 20 MG: 20 TABLET ORAL at 04:21

## 2020-11-22 RX ADMIN — RISPERIDONE 0.5 MG: 0.5 TABLET ORAL at 17:59

## 2020-11-22 RX ADMIN — LEVOTHYROXINE SODIUM 112 MCG: 112 TABLET ORAL at 04:22

## 2020-11-22 RX ADMIN — MAGNESIUM SULFATE 2 G: 2 INJECTION INTRAVENOUS at 08:40

## 2020-11-22 RX ADMIN — FAMOTIDINE 20 MG: 20 TABLET, FILM COATED ORAL at 04:23

## 2020-11-22 RX ADMIN — DIBASIC SODIUM PHOSPHATE, MONOBASIC POTASSIUM PHOSPHATE AND MONOBASIC SODIUM PHOSPHATE 250 MG: 852; 155; 130 TABLET ORAL at 08:38

## 2020-11-22 RX ADMIN — METOPROLOL SUCCINATE 75 MG: 50 TABLET, EXTENDED RELEASE ORAL at 04:14

## 2020-11-22 RX ADMIN — SPIRONOLACTONE 25 MG: 50 TABLET ORAL at 04:22

## 2020-11-22 RX ADMIN — LEVOTHYROXINE SODIUM 112 MCG: 0.11 TABLET ORAL at 04:22

## 2020-11-22 RX ADMIN — CEFTRIAXONE SODIUM 2 G: 2 INJECTION, POWDER, FOR SOLUTION INTRAMUSCULAR; INTRAVENOUS at 04:11

## 2020-11-22 RX ADMIN — DIBASIC SODIUM PHOSPHATE, MONOBASIC POTASSIUM PHOSPHATE AND MONOBASIC SODIUM PHOSPHATE 250 MG: 852; 155; 130 TABLET ORAL at 12:40

## 2020-11-22 RX ADMIN — FLUCONAZOLE 400 MG: 200 TABLET ORAL at 04:20

## 2020-11-22 RX ADMIN — TRAZODONE HYDROCHLORIDE 50 MG: 50 TABLET ORAL at 21:14

## 2020-11-22 ASSESSMENT — ENCOUNTER SYMPTOMS
CHILLS: 1
PALPITATIONS: 0
DEPRESSION: 0
MYALGIAS: 0
HEADACHES: 0
POLYDIPSIA: 0
SHORTNESS OF BREATH: 1
ABDOMINAL PAIN: 1
BRUISES/BLEEDS EASILY: 0
DIAPHORESIS: 1
HALLUCINATIONS: 0
DOUBLE VISION: 0
VOMITING: 0
HEARTBURN: 0
COUGH: 0
BLURRED VISION: 0
SORE THROAT: 0
BACK PAIN: 1
NAUSEA: 0
FEVER: 0
CONSTIPATION: 0
SHORTNESS OF BREATH: 0
DIARRHEA: 0
DIZZINESS: 0

## 2020-11-22 ASSESSMENT — PAIN DESCRIPTION - PAIN TYPE
TYPE: ACUTE PAIN
TYPE: SURGICAL PAIN
TYPE: SURGICAL PAIN

## 2020-11-22 NOTE — CARE PLAN
Problem: Safety  Goal: Will remain free from injury  Note: Fall precautions in place. Bed in lowest position. Non-skid socks in place. Personal possessions within reach. Mobility sign on door. Call light within reach. Pt educated regarding fall prevention and states understanding.         Problem: Skin Integrity  Goal: Risk for impaired skin integrity will decrease  Outcome: PROGRESSING AS EXPECTED

## 2020-11-22 NOTE — PROGRESS NOTES
[facility]     Nausea resolved with discontinuation of metronidazole   Drain out   dressing dry without ascitic leak   Wound care ongoing   Colostomy functional wbc decreasing   Pleasing short term progress

## 2020-11-22 NOTE — PROGRESS NOTES
12 Hour CC     Monitor Summary:     Afib      (R) PVC, (R) Couplets     Measurements:     --/0.08/--

## 2020-11-22 NOTE — PROGRESS NOTES
Infectious Disease Progress Note    Author: Dianne Fang M.D. Date & Time of service: 11/22/2020  12:34 PM    Chief Complaint:  Intrabdominal abscess and bacteremia     Interval History:   11/9 AF WBC 9.4 Less pain in side but remains tender to touch. Denies SE Zyvox-counseled about sxs serotonin syndrome  11/11 T-max 99.7 WBC 15.4 patient underwent colectomy, colostomy placement and I&D of flank wound with wound VAC placement on 11/10/2020 by Dr. Desouza.  Postop course complicated by hypotension and transferred to the ICU.  On pressors.  Patient complaining of shortness of breath as well as lightheadedness.  Also has postop abdominal pain.  11/12 afebrile WBC 13.3.  Patient weaned off pressors overnight.  Operative cultures growing group D Enterococcus and yeast.  Patient feeling better today.  Improved shortness of breath.  Abdominal pain stable  11/13 afebrile WBC 13.8 patient continues to feel better.  He is inquiring when he can have food by mouth.  11/14 afebrile WBC 9.9.  Patient feeling a bit better today.  His diet has been slowly advanced.  He is however complaining of some sharp abdominal pain when he coughs  11/16  AF, O2 RA, abdominal pain and poor appetite.   11/17 AF, O2 1 L NC , WBC 19.1. Drain output 465 cc yesterday. Ongoing nausea, poor appetite and abdomen is quite tender on palpation.  Will broaden antibiotics.   11/18 AF, O2 1 L NC, WBC 18.1, Drain output- 610 cc. Patient still has abdominal pain and significant nausea.  Examined abdominal wound photos and discussed with wound care team.  He has a large wound but clean appearing base with granulation tissue.  No obvious infection in the surrounding tissues.  Left side wound with packing in place, ongoing drainage but clearing .   11/20 AF, tachycardia and hypertensive , WBC 17.2, drain output-205 cc yesterday. C/o nausea and agree with plan to transition Flagyl to IV to see if this helps.       Review of Systems:  Review of Systems      Constitutional: Positive for chills. Negative for fever.   Gastrointestinal: Positive for abdominal pain. Negative for constipation, diarrhea, nausea and vomiting.       Hemodynamics:  Temp (24hrs), Av.2 °C (97.2 °F), Min:36.1 °C (97 °F), Max:36.6 °C (97.9 °F)  Temperature: 36.2 °C (97.1 °F)  Pulse  Av.8  Min: 51  Max: 161   Blood Pressure: 126/70      Physical Exam:  Physical Exam  Constitutional:       Appearance: Normal appearance. He is obese.   Cardiovascular:      Rate and Rhythm: Normal rate and regular rhythm.      Heart sounds: Normal heart sounds.   Pulmonary:      Breath sounds: Normal breath sounds.   Abdominal:      General: Abdomen is flat. There is no distension.      Palpations: Abdomen is soft.      Tenderness: There is abdominal tenderness. There is no guarding.      Comments: Left side tenderness to palpation, drain removed bandaging in place clean dry and intact.  Wound VAC in place, no sign infection in surrounding tissue   Musculoskeletal:      Right lower leg: Edema present.      Left lower leg: Edema present.   Skin:     General: Skin is warm and dry.   Neurological:      General: No focal deficit present.      Mental Status: He is alert and oriented to person, place, and time.   Psychiatric:         Mood and Affect: Mood normal.         Behavior: Behavior normal.         Meds:    Current Facility-Administered Medications:   •  NS  •  metoprolol SR  •  metoprolol SR  •  spironolactone  •  cefTRIAXone  •  cetirizine  •  magnesium oxide  •  phosphorus  •  modafinil  •  levothyroxine  •  HYDROcodone/acetaminophen  •  prochlorperazine  •  metoclopramide  •  metoprolol SR  •  lisinopril  •  amiodarone  •  risperiDONE  •  traZODone  •  enoxaparin (LOVENOX) injection  •  vancomycin  •  Metoprolol Tartrate  •  Respiratory Therapy Consult  •  MD Alert...Vancomycin per Pharmacy  •  cefTRIAXone (ROCEPHIN) IV  •  spironolactone  •  cetirizine  •  famotidine  •  fluconazole  •  insulin  glargine  •  insulin regular **AND** POC Blood Glucose **AND** NOTIFY MD and PharmD **AND** glucose **AND** dextrose 50%  •  albuterol    Labs:  Recent Labs     11/20/20 0313 11/21/20  0208 11/22/20  0300   WBC 17.2* 16.5* 15.1*   RBC 4.15* 4.04* 3.83*   HEMOGLOBIN 10.6* 10.4* 9.6*   HEMATOCRIT 35.5* 34.1* 32.5*   MCV 85.5 84.4 84.9   MCH 25.5* 25.7* 25.1*   RDW 54.6* 54.5* 54.4*   PLATELETCT 200 189 182   MPV 9.7 9.4 9.8   NEUTSPOLYS 69.60 70.80 67.30   LYMPHOCYTES 15.60* 14.00* 17.70*   MONOCYTES 9.30 10.70 10.70   EOSINOPHILS 3.10 2.40 2.50   BASOPHILS 0.20 0.20 0.10     Recent Labs     11/20/20 0313 11/21/20  0208 11/22/20  0300   SODIUM 139 137 137   POTASSIUM 3.7 3.5* 3.9   CHLORIDE 97 96 97   CO2 37* 36* 37*   GLUCOSE 150* 123* 98   BUN 15 12 13     Recent Labs     11/20/20 0313 11/21/20  0208 11/22/20  0300   ALBUMIN 2.6* 2.5* 2.6*   TBILIRUBIN 0.6 0.7 0.6   ALKPHOSPHAT 79 77 81   TOTPROTEIN 5.4* 5.5* 5.1*   ALTSGPT <5 7 8   ASTSGOT 16 13 16   CREATININE 0.58 0.46* 0.61       Imaging:  Ct-abdomen-pelvis W/o    Result Date: 11/17/2020 11/17/2020 1:51 PM HISTORY/REASON FOR EXAM:  Nausea/vomiting; elevated WBC; R/O abscess. TECHNIQUE/EXAM DESCRIPTION: CT scan of the abdomen and pelvis without contrast. Noncontrast helical scanning was obtained from the diaphragmatic domes through the pubic symphysis. Low dose optimization technique was utilized for this CT exam including automated exposure control and adjustment of the mA and/or kV according to patient size. COMPARISON: 11/8/2020. FINDINGS: There is a moderate left and small right pleural effusion with overlying atelectasis/consolidation. Trace free air is seen in the abdomen. Evaluation of parenchymal and vascular structures is limited by the lack of intravenous contrast. The liver appears  unremarkable. There are gallstones within the gallbladder. There is hypodensity along the anterior aspect of the spleen measuring approximately 4.3 x 2.9 cm. No adrenal  mass is identified. There is right renal atrophy with cortical scarring. There is no  evidence of hydronephrosis. Pancreas appears unremarkable. Aorta is not aneurysmal. There are small inguinal, retroperitoneal and mesenteric lymph nodes. There is no evidence of bowel obstruction. Visualized appendix appears unremarkable. There is a left abdominal stoma. There is parastomal density measuring up to 2.8 x 2.1 cm likely representing a postsurgical hematoma. There is a small amount of free fluid in the abdomen and pelvis. Bladder is mildly distended. There is a surgical drain in the left abdomen. There is third spacing. Degenerative changes are seen in the spine. Compression deformities of T11 and T12 are again noted. Bones are demineralized.     Postsurgical changes with a left abdominal stoma. Parastomal densities may be related to postsurgical hematoma measuring up to 2.8 x 2.1 cm. Small amount of free fluid in the abdomen and pelvis. Collection along the anterior spleen measures approximately 2.9 x 4.3 cm is decreased in size compared to the prior examination. Evaluation is limited by lack of intravenous contrast. Third spacing. Trace free intraperitoneal air is likely related to recent surgery. Moderate left and small right pleural effusions with overlying atelectasis/consolidation. Cortical scarring of the right kidney. Cholelithiasis.     Ct-abdomen-pelvis With    Result Date: 11/9/2020 11/8/2020 11:34 PM HISTORY/REASON FOR EXAM:  Abdominal infection suspected; Has perisplenic abscess with possible connection to bowel TECHNIQUE/EXAM DESCRIPTION: CT of the abdomen and pelvis with contrast. Contrast-enhanced helical scanning was obtained from the diaphragmatic domes through the pubic symphysis following the bolus administration of 100 mL of nonionic contrast (Omnipaque 350) without complication. Low dose optimization technique was utilized for this CT exam including automated exposure control and adjustment of  the mA and/or kV according to patient size. COMPARISON: 11/3/2020 CT FINDINGS: The visualized lung bases show diminished small left pleural fluid that layers dependently. There is similar middle and left lower lobe atelectasis . Similar multichamber cardiac enlargement CT Abdomen: There is a new pigtail catheter in the anterior perisplenic fluid collection. The collection does diminish in size now measuring 58 x 33 from 79 x 40 mm. There is still gas within it and it is in direct contact with the splenic flexure of the colon. A small amount of gas is also seen in the more superficial soft tissues inferior to the pigtail catheter which crosses the ninth intercostal space No new abscess is detected Contrast is present in the small bowel but not yet in the transverse colon. No contrast extravasation is seen. No abnormal bowel dilatation The pancreas, adrenal glands and kidneys enhance normally. Mild hepatosplenomegaly Some low-density in the gallbladder likely indicating cholelithiasis There is no lymphadenopathy or aneurysmal change of the aorta. Compression deformities in the spine are again seen with subacute fractures resulting in anterior wedging at T11 and T12 There is diffuse fat stranding indicating anasarca CT Pelvis: Visualized pelvic structures are unremarkable. Prostate is within normal limits for age. No lymphadenopathy. There is no free air or free fluid.     Interval decrease in size of perisplenic abscess. Pigtail catheter now in place Abscess has likely fistulous communication with the abutting splenic flexure of the colon. Colon is otherwise normal in appearance Mild hepatosplenomegaly Decreasing small left pleural effusion Subacute anterior wedge compression fractures in the caudal thoracic spine    Ct-abdomen-pelvis With    Addendum Date: 11/5/2020    Addendum: A prior noncontrast CT of the abdomen and pelvis from [facility] dated 10/19/2020 was made available for comparison. On  the prior study a perisplenic/subcapsular fluid collection was seen however did not contain air at that time and there was no discrete evidence of communication with the transverse colon. Communication with splenic flexure of the colon and air within the collection are new from the prior study.    Result Date: 11/5/2020  11/3/2020 4:02 PM HISTORY/REASON FOR EXAM:  Abd pain, unspecified; IV contrast only. TECHNIQUE/EXAM DESCRIPTION:   CT scan of the abdomen and pelvis with contrast. Contrast-enhanced helical scanning was obtained from the diaphragmatic domes through the pubic symphysis following the bolus administration of nonionic contrast without complication. 100 mL of Omnipaque 350 nonionic contrast was administered without complication. Low dose optimization technique was utilized for this CT exam including automated exposure control and adjustment of the mA and/or kV according to patient size. COMPARISON: 9/26/2018 FINDINGS: Chest Base: Small left pleural effusion with mild bibasilar atelectasis. Heart is mildly enlarged. Liver:  Diffuse hypoattenuation of the liver suggesting fatty infiltration. Moderately enlarged. Gallbladder: Cholelithiasis. Biliary tract: Nondilated. Pancreas: Normal. Spleen: There is a rim-enhancing perisplenic fluid collection which contains air and which communicates with the splenic flexure of the colon. This most likely represents an abscess and measures about 8.7 x 7.9 cm. The anterior aspect of the spleen is nonenhancing with irregular margins could be related to infection of splenic parenchyma or infarction. Adrenals: Normal. Kidneys and Collecting Systems:  There is mild right renal cortical scarring. No hydronephrosis. No renal mass. Gastrointestinal tract:  No bowel obstruction. The appendix is normal. Peritoneum: No free air or free fluid. Reproductive organs:  Normal. Bladder:  Normal. Vessels:  Normal caliber. Lymph  Nodes:  No lymphadenopathy. Abdominal wall: Within normal  limits. Bones:  Age indeterminate possibly subacute to chronic T11 and T12 superior endplate compression fractures.     1.  Peripherally enhancing perisplenic fluid collection which communicates with the splenic flexure of the colon and which contains foci of air is most consistent with an abscess/infected fluid collection. There is nonenhancement of the anterior aspect of the spleen suggesting infection/infarction. Surgical consultation is recommended. 2.  Hepatomegaly and hepatic steatosis. 3.  Cholelithiasis. 4.  Right renal cortical scarring. No hydronephrosis. 5.  Small left pleural effusion. Mild bibasilar atelectasis. 6.  Subacute to chronic appearing T11 and T12 superior endplate compression fractures. These findings were discussed with SVETLANA PAREDES on 11/3/2020 4:49 PM.     Ct-drain-peritoneal    Result Date: 11/4/2020 11/4/2020 5:19 PM HISTORY/REASON FOR EXAM:  Joya Splenic Fluid Collection TECHNIQUE/EXAM DESCRIPTION: LUQ parasplenic abscess drainage with CT guidance. Low dose optimization technique was utilized for this CT exam including automated exposure control and adjustment of the mA and/or kV according to patient size. PROCEDURE: Informed consent was obtained. SEDATION: Moderate sedation was provided. Pulse oximetry and continuous cardiac monitoring by the nurse was performed throughout the exam. Intraservice time was 30 minutes. Localizing CT images were obtained with the patient in supine position. The skin was prepped with Betadine and draped in a sterile fashion. Following local anesthesia with 1% Lidocaine, a 17 G guiding needle was placed and needle path confirmed with CT. An Amplatz guidewire was placed and following serial tract dilatation, a 10 Uzbek pigtail locking catheter was placed. A specimen was collected and submitted for culture and sensitivity and Gram stain. Total of 6 mL of Brownish fluid was drained. The catheter was secured to the skin and connected to suction bulb  drainage. Final CT images were obtained documenting catheter position. The patient tolerated the procedure well with no evidence of complication. COMPARISON: CT scan abdomen and pelvis 11/3/2020 FINDINGS: The final CT images show satisfactory catheter position within the target collection.     1.  CT guided perisplenic abscess catheter drainage. 2.  The current plan is to obtain a follow-up CT in 5-7 days..    Ly-xhaboau-1 View    Result Date: 11/20/2020 11/20/2020 2:00 PM HISTORY/REASON FOR EXAM:  Abdominal Pain. TECHNIQUE/EXAM DESCRIPTION AND NUMBER OF VIEWS:  1 view(s) of the abdomen. COMPARISON: None FINDINGS: No evidence for small bowel obstruction. Surgical clips present in the mid abdomen. LEFT mid abdominal stoma noted. Surgical drain present in the LEFT lateral abdomen. Catheter projects of the RIGHT midabdomen, possibly wound VAC. Degenerative change of lumbar spine.     1.  No evidence of bowel obstruction. 2.  Postoperative changes as described.    Dx-chest-portable (1 View)    Result Date: 11/17/2020 11/17/2020 9:19 AM HISTORY/REASON FOR EXAM: Elevated white blood cell count. TECHNIQUE/EXAM DESCRIPTION AND NUMBER OF VIEWS: Single portable view of the chest. COMPARISON: 11/10/2020 FINDINGS: There are bilateral interstitial and airspace infiltrates. There is small bilateral pleural effusion. The heart is enlarged. Right subclavian central line is again seen.     1.  Bilateral airspace and interstitial infiltrates. 2.  Cardiomegaly.    Dx-chest-portable (1 View)    Result Date: 11/10/2020  11/10/2020 12:29 PM HISTORY/REASON FOR EXAM: new central line. TECHNIQUE/EXAM DESCRIPTION AND NUMBER OF VIEWS: Single AP view of the chest. COMPARISON: November 3 FINDINGS: Hardware: ] Tip overlies the cavoatrial junction Lungs: Linear middle lobe and left basilar opacity is seen. Volumes are low Pleura:  No pneumothorax is identified Heart and mediastinum: There is stable cardiac silhouette enlargement.     No  pneumothorax identified following right subclavian line placement Worsening atelectasis with consolidation at the bases not excluded Stable cardiac silhouette enlargement    Dx-chest-portable (1 View)    Result Date: 11/3/2020  11/3/2020 3:41 PM HISTORY/REASON FOR EXAM:  Chest Pain. TECHNIQUE/EXAM DESCRIPTION AND NUMBER OF VIEWS: Single portable view of the chest. COMPARISON: September 10, 2020 FINDINGS: The cardiac size is enlarged. There is interstitial prominence. Lung aeration has improved. No pneumothorax. No obvious pleural effusion.     Slight interval interval improvement in congestive heart failure.    Us-renal    Result Date: 2020 6:21 PM HISTORY/REASON FOR EXAM:  Abnormal Labs TECHNIQUE/EXAM DESCRIPTION: Renal ultrasound. COMPARISON:  None FINDINGS: The right kidney measures 9.39 cm. The left kidney measures 9.54 cm. There is no hydronephrosis. There are no abnormal calcifications. The bladder not fully the time of imaging.      Examination was limited due to poor sound penetration related to body habitus. Grossly normal appearance of the kidneys.    Ec-echocardiogram Complete W/ Cont    Result Date: 2020  Transthoracic Echo Report Echocardiography Laboratory CONCLUSIONS Normal left ventricular chamber size. Moderately reduced left ventricular systolic function. Left ventricular ejection fraction is visually estimated to be 30-40%; variable related to atrial fibrillation. Mild mitral regurgitation. Moderately dilated right ventricle. Reduced right ventricular systolic function. Right heart pressures are normal. No prior study is available for comparison. EMELY FLORES Exam Date:         2020                    11:34 Exam Location:     Inpatient Priority:          Routine Ordering Physician:        SUZI EWING Referring Physician: Sonographer:               Sherry Funes [facility] Age:    55     Gender:    M MRN:    [id number] :    1965 BSA:    2.7    Ht (in):    72      Wt (lb):    350 Exam Type:     Complete, Contrast Indications:     Congestive Heart Failure ICD Codes:       428 CPT Codes:       79667,  BP:   136    /   86     HR:   100 Technical Quality:       Technically difficult study -                          adequate information is obtained MEASUREMENTS  (Male / Female) Normal Values 2D ECHO LV Diastolic Diameter PLAX        4.6 cm                4.2 - 5.9 / 3.9 - 5.3 cm LV Systolic Diameter PLAX         4.1 cm                2.1 - 4.0 cm IVS Diastolic Thickness           1.1 cm                LVPW Diastolic Thickness          0.85 cm               LVOT Diameter                     2.5 cm                LV Ejection Fraction MOD 4C       34.7 %                IVC Diameter                      0.95 cm               DOPPLER AV Peak Velocity                  1.3 m/s               AV Peak Gradient                  6.3 mmHg              AV Mean Gradient                  3.9 mmHg              LVOT Peak Velocity                1 m/s                 AV Area Cont Eq vti               3.7 cm2               TR Peak Velocity                  232 cm/s              * Indicates values subject to auto-interpretation LV EF:        % FINDINGS Left Ventricle 3 mL of contrast was administered. Contrast was used to enhance visualization of the endocardial border. Existing IV was used. Mild concentric left ventricular hypertrophy. Normal left ventricular chamber size.  Moderately reduced left ventricular systolic function. Left ventricular ejection fraction is visually estimated to be 30-40%. Diastolic function is difficult to assess with atrial fibrillation. Right Ventricle Moderately dilated right ventricle. Reduced right ventricular systolic function. Right Atrium Enlarged right atrium. Normal inferior vena cava size and inspiratory collapse. Left Atrium Normal left atrial size. Mitral Valve Structurally normal mitral valve. Mild mitral regurgitation. No mitral stenosis. Aortic Valve  Tricuspid aortic valve. Structurally normal aortic valve. No stenosis or regurgitation seen. Tricuspid Valve Structurally normal tricuspid valve. Mild tricuspid regurgitation. Right atrial pressure is estimated to be 3 mmHg. Estimated right ventricular systolic pressure  is 25 mmHg. No tricuspid stenosis. Right heart pressures are normal. Pulmonic Valve Structurally normal pulmonic valve without significant stenosis or regurgitation. Pericardium Normal pericardium without effusion. Aorta The aortic root is normal.  Ascending aorta diameter is 3.6 cm. Germain Pacheco M.D. (Electronically Signed) Final Date:     11 November 2020                 12:38 Amended:        11 November 2020 12:40      Micro:  Results     Procedure Component Value Units Date/Time    URINALYSIS [[id number]]  (Abnormal) Collected: 11/17/20 2302    Order Status: Completed Specimen: Urine, Clean Catch Updated: 11/17/20 2350     Color Yellow     Character Cloudy     Specific Gravity 1.018     Ph 5.0     Glucose Negative mg/dL      Ketones Negative mg/dL      Protein 30 mg/dL      Bilirubin Negative     Urobilinogen, Urine 0.2     Nitrite Negative     Leukocyte Esterase Negative     Occult Blood Small     Micro Urine Req Microscopic    Narrative:      Collected By:[id number] ELDER SENA    CULTURE WOUND W/ GRAM STAIN [[id number]]  (Abnormal)  (Susceptibility) Collected: 11/10/20 1039    Order Status: Completed Specimen: Wound Updated: 11/16/20 1152     Significant Indicator POS     Source WND     Site COLON PERFORATION     Culture Result -     Gram Stain Result Rare WBCs.  Rare Gram positive cocci.  Rare Gram positive rods.       Culture Result Enterococcus faecalis  Light growth        Candida albicans  Light growth      Susceptibility     Enterococcus faecalis (1)     Antibiotic Interpretation Microscan Method Status    Daptomycin Sensitive 4 mcg/mL ISAMAR Final    Ampicillin Sensitive <=2 mcg/mL ISAMAR Final    Gent Synergy Sensitive <=500 mcg/mL ISAMAR  Final    Vancomycin Sensitive 1 mcg/mL ISAMAR Final    Penicillin Sensitive 8 mcg/mL ISAMAR Final                   Anaerobic Culture [[id number]] Collected: 11/10/20 1039    Order Status: Completed Specimen: Wound Updated: 11/16/20 1152     Significant Indicator NEG     Source WND     Site COLON PERFORATION     Culture Result No Anaerobes isolated.          Assessment:  Active Hospital Problems    Diagnosis   • *Spontaneous perforation of colon ([facility]) [K63.1]   • Leucocytosis [D72.829]   • Schizophrenia ([facility]) [F20.9]   • Sepsis (HCC) [A41.9]   • Elevated digoxin level [R78.89]   • SRUTHI (acute kidney injury) ([facility]) [N17.9]   • Adrenal insufficiency ([facility]) [E27.40]   • LV dysfunction [I51.9]   • Paroxysmal A-fib with RVR ([facility]) [I48.0]   • Type 2 diabetes mellitus, without long-term current use of insulin, with peripheral neuropathy ([facility]) [E11.9]   • Hypothyroidism [E03.9]   • Prolonged QT interval [R94.31]     Interval 24 hours:      AF, O2 3 L NC   Labs reviewed, WBC 15.1   Micro reviewed  Events-left side drain removed on 11/21    Patient reports his nausea has resolved since stopping Flagyl.  He does have some ongoing left side abdominal pain reports a chill earlier but this is now resolved.  Patient continued on antibiotics as below.    Assessment:  55 y.o. diabetic male admitted 11/3/2020.+ A. fib, CHD, and severe morbid obesity.  Seen by ID in the past for right foot abscess with cultures positive for MRSA and Proteus.  s/p GLF approximately 1 month ago and was admitted to [facility] with left upper quadrant pain.  CT at the time revealed a subcapsular splenic hematoma. Presented to ED complaining of abdominal pain.     Hospital Course:   CT scan on 11/3 revealed a peripherally enhancing perisplenic fluid collection measuring 8.7 x 7.9 cm, communicating with the splenic flexure of the colon and contained foci of air consistent with abscess/infected fluid.  Also noted nonenhancing of the anterior aspect of the spleen  suggesting infection/infarction.    Status post surgery on 11/10/2020     Postop hypotension, resolved  Leukocytosis -ongoing, remains elevated, some improvement today     Acute kidney injury- improved   Renal ultrasound-unremarkable  Nephrology following     Bacteremia  -Blood cultures on 11/31/2 + viridans streptococci, possible contaminant  -Blood culture on 11/5 with no growth to date     Perisplenic abscess with possible communication to the bowel  Left upper quadrant abscess  Afebrile  Splenic enhancement on CT, suggesting infection or infarction  -Drainage on 11/4 and peritoneal fluid Enterococcus faecalis, ampicillin sensitive and C albicans  Recent ground-level fall located by a subcapsular splenic hematoma   Status post segmental colectomy, colostomy, irrigation and debridement of flank wound and wound VAC placement on 11/10/2020 by Dr. Desouza.  Patient found to have a left upper quadrant abscess inferior to the spleen with a large bowel wall defect.  Also noted to have an abdominal wall abscess.  Operative cultures (colon perforation) -ampicillin sensitive Enterococcus faecalis and Candida albicans.  Gram stain+ GPC, GPR  -CT abdomen pelvis without on 11/17-hypodensity along anterior aspect of the spleen measuring 4.3 x 2.9 cm.  Parastomal density measuring two-point by 2.1 cm likely representing postsurgical hematoma.  This is decreased in size from prior.  Small amount of free fluid in the abdomen pelvis.  -Drain removed on 11/21     Type II diabetes mellitus  Keep BS under 150 to help control current infection     A. Fib  Contributing to hypotension  Cards following      Diarrhea  -C. difficile tested negative on 11/4     Antibiotic allergies: Daptomycin rash, penicillins rash and hives, tolerates cephalosporins-discussed allergies and he states that he has developed significant rash and hives with penicillins several times, most recently a year and a half ago- will avoid penicillins     Nausea and  poor appetite-potentially related to linezolid      Plan:      ---  Continue vancomycin to treat the Enterococcus, continue ceftriaxone to provide gram-negative coverage and  stopped Flagyl as he had ongoing nausea even with IV.  This is now resolved. Antibiotic selection is limited due to his numerous allergies.  --- Continue fluconazole 400 mg daily  for the Candida  --- Monitor CBC and CMP   --- Avoid nephrotoxins and renally dose medication  --- Repeat CT abdomen and pelvis with contrast on 11/24 -depending on results may be able to give final recommendations     Discussed with [facility] medicine team.  ID will follow.

## 2020-11-22 NOTE — PROGRESS NOTES
Assumed care at 0645, bedside report received from ELISA Ruiz. Pt is a-fib on the telemetry monitor. Patient is AO x 4 and is resting in bed. Initial assessment completed and orders reviewed. POC discussed with patient. Call light within reach and hourly rounding in place. No further questions at this time. Fall precautions in place.

## 2020-11-22 NOTE — PROGRESS NOTES
Pharmacy Kinetics 55 y.o. male on vancomycin day # 6 11/22/2020    Currently Dose: Vancomycin 1750 mg iv q24hr (~10 mg/kg/dose)  Received Load Dose: Yes    Indication for Treatment: IAI/abscess  Provider Specified End Date: None  ID Service Following: Yes    Pertinent history per medical record: Admitted on 11/3/2020 for concern of abdominal pain after surgical intervention. Concern for subsequent colonic perforation and possible abscess. ID and surgery consulted.    Other antibiotics: ceftriaxone 2 gm iv q24h, fluconazole 400 mg po q24h    Allergies: Daptomycin, Pcn [penicillins], Unasyn [ampicillin-sulbactam sodium], and Vancomycin     List concerns for accumulation of vancomycin: BMI ~57, BUN:SCr > 20:1    Pertinent cultures to date:   Results     Procedure Component Value Units Date/Time    URINALYSIS [[id number]]  (Abnormal) Collected: 11/17/20 2302    Order Status: Completed Specimen: Urine, Clean Catch Updated: 11/17/20 2350     Color Yellow     Character Cloudy     Specific Gravity 1.018     Ph 5.0     Glucose Negative mg/dL      Ketones Negative mg/dL      Protein 30 mg/dL      Bilirubin Negative     Urobilinogen, Urine 0.2     Nitrite Negative     Leukocyte Esterase Negative     Occult Blood Small     Micro Urine Req Microscopic    Narrative:      Collected By:[id number] ELDER SENA    CULTURE WOUND W/ GRAM STAIN [[id number]]  (Abnormal)  (Susceptibility) Collected: 11/10/20 1039    Order Status: Completed Specimen: Wound Updated: 11/16/20 1152     Significant Indicator POS     Source WND     Site COLON PERFORATION     Culture Result -     Gram Stain Result Rare WBCs.  Rare Gram positive cocci.  Rare Gram positive rods.       Culture Result Enterococcus faecalis  Light growth        Candida albicans  Light growth      Susceptibility     Enterococcus faecalis (1)     Antibiotic Interpretation Microscan Method Status    Daptomycin Sensitive 4 mcg/mL ISAMAR Final    Ampicillin Sensitive <=2 mcg/mL ISAMAR Final    Gent  Synergy Sensitive <=500 mcg/mL ISAMAR Final    Vancomycin Sensitive 1 mcg/mL ISAMAR Final    Penicillin Sensitive 8 mcg/mL ISAMAR Final                   Anaerobic Culture [[id number]] Collected: 11/10/20 1039    Order Status: Completed Specimen: Wound Updated: 20 1152     Significant Indicator NEG     Source WND     Site COLON PERFORATION     Culture Result No Anaerobes isolated.        MRSA nares swab if pneumonia is a concern (ordered/positive/negative/n-a): n/a    Recent Labs     20  0300   WBC 17.2* 16.5* 15.1*   NEUTSPOLYS 69.60 70.80 67.30     Recent Labs     208 20  0300   BUN 15 12 13   CREATININE 0.58 0.46* 0.61   ALBUMIN 2.6* 2.5* 2.6*     Recent Labs     20  0300   VANCOTROUGH 14.0     Intake/Output Summary (Last 24 hours) at 2020 0846  Last data filed at 2020 0441  Gross per 24 hour   Intake --   Output 700 ml   Net -700 ml      /92   Pulse 79   Temp 36.2 °C (97.1 °F) (Temporal)   Resp 18   Ht 1.829 m (6')   Wt (!) 191 kg (421 lb 1.3 oz)   SpO2 98%  Temp (24hrs), Av.2 °C (97.2 °F), Min:36.1 °C (96.9 °F), Max:36.6 °C (97.9 °F)    Estimated Creatinine Clearance: 232.4 mL/min (by C-G formula based on SCr of 0.61 mg/dL).    A/P   1. Vancomycin dose change: not indicated   2. Next vancomycin level: ~2-3 days (not ordered)  3. Goal trough: 10-15 mcg/mL  4. Comments: VS stable. Afebrile. WBC elevated/improved. SCr with slight increase. CrCl likely inaccurate due to body habitus. Microbiology noted. Vancomycin level noted and at goal range. Patient appears to be tolerating vancomycin at reduced infusion rate with addition of cetirizine/famotidine. Likely to repeat vancomycin level in ~2-3 days. CMP and CBC with AM labs. Pharmacy will continue to follow.    Eric Peacock, PharmD

## 2020-11-22 NOTE — PROGRESS NOTES
Daily Progress Note:     Date of Service: 11/22/2020  Primary Team: [facility] Yellow Team   Attending: JONATHAN Myrick M.D.   Senior Resident: Dr. Peace    Contact:  [phone]    HPI & Hospital course: 55-year-old male with past medical history of obesity BMI of 53,  type 2 diabetes mellitus,  atrial fibrillation, congestive heart failure EF of 30% 11/2020 schizophrenia with depression had a history of ground-level fall 6 weeks ago CT abdomen showed subcapsular splenic hematoma seen and discharged on oral antibiotic.  Patient presented on 11/3 with history of increasing abdominal pain, CT abdomen at that time showed intra-abdominal abscess admitted for surgery and got surgery done showed perforated splenic flexure and received colectomy on 11/10 with ostomy placement.  Patient had A. fib with RVR started on digoxin by cardiology on 11/14 levels checked 11/14 1.5 and 11/16 >5.  Transfer to telemetry for further management.  Did not receive antibody to digoxin but repeat digoxin level is 0.8.started on metoprolol XL for AF and titrating.     Interval update: Patient still have chills and sweats but his nausea, hallucinations got better  Telemetry showing A. fib with heart rate of 70-80  Drains removed yesterday, wounds clean, colostomy wound normal    Consultants/Specialty:  General surgery  Psychiatric  Infectious diseases    Review of Systems:    Review of Systems   Constitutional: Positive for chills and diaphoresis.   HENT: Negative for hearing loss and sore throat.    Eyes: Negative for blurred vision and double vision.   Respiratory: Negative for cough and shortness of breath.    Cardiovascular: Negative for chest pain and palpitations.   Gastrointestinal: Positive for abdominal pain. Negative for heartburn, nausea and vomiting.   Genitourinary: Negative for dysuria and urgency.   Musculoskeletal: Positive for back pain. Negative for myalgias.   Skin: Negative for itching and rash.   Neurological:  Negative for dizziness and headaches.   Endo/Heme/Allergies: Negative for polydipsia. Does not bruise/bleed easily.   Psychiatric/Behavioral: Negative for depression and hallucinations.       Objective Data:   Physical Exam:   Vitals:   Temp:  [36.1 °C (96.9 °F)-36.6 °C (97.9 °F)] 36.1 °C (96.9 °F)  Pulse:  [67-98] 67  Resp:  [18-20] 18  BP: (114-147)/(68-92) 120/68  SpO2:  [96 %-99 %] 96 %    Physical Exam  Constitutional:       General: He is not in acute distress.     Appearance: He is obese. He is ill-appearing.   HENT:      Head: Normocephalic and atraumatic.      Right Ear: Tympanic membrane normal.      Left Ear: Tympanic membrane normal.      Nose: Nose normal. No congestion.      Mouth/Throat:      Mouth: Mucous membranes are moist.      Pharynx: Oropharynx is clear. No oropharyngeal exudate.   Eyes:      General: No scleral icterus.     Pupils: Pupils are equal, round, and reactive to light.      Comments: R sided strabismus (pre-existing according to patient    Neck:      Musculoskeletal: Normal range of motion. No neck rigidity.   Cardiovascular:      Rate and Rhythm: Normal rate. Rhythm irregular.      Pulses: Normal pulses.      Heart sounds: Normal heart sounds. No murmur.   Pulmonary:      Effort: No respiratory distress.      Breath sounds: Normal breath sounds.   Abdominal:      General: Bowel sounds are normal. There is distension.      Palpations: Abdomen is soft.      Tenderness: There is no abdominal tenderness.      Comments: Wound VAC draining, colostomy wound clean   Musculoskeletal:      Right lower leg: Edema present.      Left lower leg: Edema present.   Skin:     General: Skin is warm.   Neurological:      General: No focal deficit present.      Mental Status: He is alert and oriented to person, place, and time.      Cranial Nerves: No cranial nerve deficit.   Psychiatric:      Comments: Mood and affect better           Labs:   Recent Labs     11/20/20  0313 11/21/20  0208 11/22/20  0300    WBC 17.2* 16.5* 15.1*   RBC 4.15* 4.04* 3.83*   HEMOGLOBIN 10.6* 10.4* 9.6*   HEMATOCRIT 35.5* 34.1* 32.5*   MCV 85.5 84.4 84.9   MCH 25.5* 25.7* 25.1*   RDW 54.6* 54.5* 54.4*   PLATELETCT 200 189 182   MPV 9.7 9.4 9.8   NEUTSPOLYS 69.60 70.80 67.30   LYMPHOCYTES 15.60* 14.00* 17.70*   MONOCYTES 9.30 10.70 10.70   EOSINOPHILS 3.10 2.40 2.50   BASOPHILS 0.20 0.20 0.10     Recent Labs     11/20/20  0313 11/21/20  0208 11/22/20  0300   SODIUM 139 137 137   POTASSIUM 3.7 3.5* 3.9   CHLORIDE 97 96 97   CO2 37* 36* 37*   GLUCOSE 150* 123* 98   BUN 15 12 13     Recent Labs     11/20/20 0313 11/21/20  0208 11/22/20  0300   ALBUMIN 2.6* 2.5* 2.6*   TBILIRUBIN 0.6 0.7 0.6   ALKPHOSPHAT 79 77 81   TOTPROTEIN 5.4* 5.5* 5.1*   ALTSGPT <5 7 8   ASTSGOT 16 13 16   CREATININE 0.58 0.46* 0.61     Imaging:   [id number]-[id number]-9 VIEW   Final Result      1.  No evidence of bowel obstruction.   2.  Postoperative changes as described.      CT-ABDOMEN-PELVIS W/O   Final Result      Postsurgical changes with a left abdominal stoma. Parastomal densities may be related to postsurgical hematoma measuring up to 2.8 x 2.1 cm.      Small amount of free fluid in the abdomen and pelvis.      Collection along the anterior spleen measures approximately 2.9 x 4.3 cm is decreased in size compared to the prior examination. Evaluation is limited by lack of intravenous contrast.      Third spacing.      Trace free intraperitoneal air is likely related to recent surgery.      Moderate left and small right pleural effusions with overlying atelectasis/consolidation.      Cortical scarring of the right kidney.      Cholelithiasis.         DX-CHEST-PORTABLE (1 VIEW)   Final Result      1.  Bilateral airspace and interstitial infiltrates.      2.  Cardiomegaly.      US-RENAL   Final Result    Examination was limited due to poor sound penetration related to body habitus.   Grossly normal appearance of the kidneys.      EC-ECHOCARDIOGRAM COMPLETE W/ CONT   Final  Result      DX-CHEST-PORTABLE (1 VIEW)   Final Result      No pneumothorax identified following right subclavian line placement      Worsening atelectasis with consolidation at the bases not excluded      Stable cardiac silhouette enlargement      CT-ABDOMEN-PELVIS WITH   Final Result      Interval decrease in size of perisplenic abscess. Pigtail catheter now in place      Abscess has likely fistulous communication with the abutting splenic flexure of the colon. Colon is otherwise normal in appearance      Mild hepatosplenomegaly      Decreasing small left pleural effusion      Subacute anterior wedge compression fractures in the caudal thoracic spine      CT-DRAIN-PERITONEAL   Final Result      1.  CT guided perisplenic abscess catheter drainage.   2.  The current plan is to obtain a follow-up CT in 5-7 days..      OUTSIDE IMAGES-CT CHEST   Final Result      OUTSIDE IMAGES-CT ABDOMEN /PELVIS   Final Result      CT-ABDOMEN-PELVIS WITH   Final Result   Addendum 1 of 1   Addendum:   A prior noncontrast CT of the abdomen and pelvis from [facility] dated 10/19/2020 was made available for comparison. On the    prior study a perisplenic/subcapsular fluid collection was seen however    did not contain air at that time and    there was no discrete evidence of communication with the transverse colon.    Communication with splenic flexure of the colon and air within the    collection are new from the prior study.      Final      DX-CHEST-PORTABLE (1 VIEW)   Final Result      Slight interval interval improvement in congestive heart failure.            * Spontaneous perforation of colon (HCC)- (present on admission)  Assessment & Plan  Admitted with LUQ abscess and perforation at splenic flexure   Segmental colectomy, colostomy, Patti pouch, I & D of abdominal wall abscess  Wound cultures: enterococcus and candida  - DCed Zyvox due to prolonged QTc  - continue Fluconazole(11/14)  - continue vancomycin  (11/17)  - continue ceftriaxone  11/17and   - DC Flagyl due to nausea and Dced Clinda also per ID recommendations.    Schizophrenia ([facility])  Assessment & Plan  Patient with H/O schizophrenia on Olanzapine and sertraline.  Due to prolonged QT decrease the dose of olanzapine and sertraline  QT reduced with discontinuation of linezolid and Zofran also  Patient starting feeling symptoms of hallucinations, and also patient complaining of suicidal ideation but he had no plan.  -Increased olanzapine and sertraline to home dose  - avoid other QTc prolonging meds.  - seen by psychiatry and DCed Zyprexa and zoloft and started on Resperidone and modafinil  - will monitor.    Leucocytosis  Assessment & Plan  Improving      Sepsis ([facility])  Assessment & Plan  Secondary to perforation of colon,  Wound cultures: enterococcus & candida.  Started on Linezolid and Fluconazole. But his QTc is prolonged.  - DCed linezolide and started on vancomycin.  - continue Fluconazole  - DC Zyvox due to prolonged QTc  - continue  vancomycin (11/17)  - continue ceftriaxone 11/17and   - Dced Flagyl on 11/21 due to nausea - nausea improved    Elevated digoxin level  Assessment & Plan  H/o N/v and visual hallucinations including yellow halos, circles and squares  AFTER STARTING digoxin and 11/14 digoxin level 1.5 & on 11/17 Repeat digoxin level > 5,   & 0.8  Patient has N/V and hallucinations.  - MONITOR  - Dced DIGOXIN      SRUTHI (acute kidney injury) ([facility])  Assessment & Plan  improving    LV dysfunction- (present on admission)  Assessment & Plan    11/11 Echocardiogram completed with normal left ventricular chamber size.  Moderately reduced left ventricular systolic function.  Left ventricular ejection fraction is visually estimated to be 30-40%  to continue spironolactone 25 qd  -continue  metop 75 mg BD  -continue lisinopril 20 mg qd,     Paroxysmal A-fib with RVR ([facility])- (present on admission)  Assessment & Plan  Seen by cardiac electrophysiologist in  2019 and deemed not to be good candidate for anticoagulation due to frequent falls 2/2 orthostatic hypotension  CHADSVASC of 3 started digoxin by cardiology and on 11/14 checked digoxin level 1.5 & on 11/17 Repeat digoxin level > 5,transferred to tele floor for monitoring, planned to give digoxin Ab but repeat level decreased to 0.8.  Started on metoprolol 50 mg daily, increased dose to BD & monitored on tele.  Rate is controlled.  - continue metop to 75 mg BD  - continue amiodarone 200 mg daily      Hypothyroidism  Assessment & Plan  Obese male   TSH 16.0 and T4 1.14  - start on Levothyroxine 112mcg daily.    Prolonged QT interval  Assessment & Plan  Patient's QTc on admission 477 increased to 535 with Linezolid, zofran, zoloft, olanzapine.  DCed Linezolid, zofran, olanzapine & zoloft  QTc improved from 535 to 425 to 473  Seen by psychiatry for medication recommendations started on resperidone and modafinil.  - avoid QT prolonging medication.    Type 2 diabetes mellitus, without long-term current use of insulin, with peripheral neuropathy (HCC)- (present on admission)  Assessment & Plan  Patient states blood sugar well controlled at home with metformin. Last a1c of 7.8 on 11/10/20   - continue Lantus 10U daily  - Sliding scale insulin with hypoglycemia protocol  - Ensure strict glucose control

## 2020-11-22 NOTE — NON-PROVIDER
Daily Progress Note:     Date of Service: 11/22/2020  Primary Team: [facility] Red Team  and [facility] Yellow Team   Attending: JONATHAN Myrick M.D.   Senior Resident: Dr. Peace  Medical Student: Lyao Harp, Student  Contact:  [phone]    Chief Complaint:   Intraabdominal abscess with perforated splenic flexure of the colon and afib with RVR    ID:  Patient is a 55 y.o. male who underwent a colectomy and ostomy placement for an intraabdominal abscess with perforated splenic flexure with a PMH of afib with RVR, DM2, hyperlipidemia, schizophrenia, depression, and CHF.     Interval update:   Patient appears to be doing much better today with less SOB, fatigue and chills as well as the absence of N/V,  depression, anxiety, and auditory hallucinations.  His WBC continues to trend down now to 15.1.  The absence of N/V seems to be a reaction to discontinuation of his flagyl.     Consultants/Specialty:  ID  Surgery  Cardio  Nephro    Review of Systems:    Review of Systems   Constitutional: Positive for chills and malaise/fatigue.   HENT: Negative for hearing loss.    Eyes: Negative for blurred vision and double vision.   Respiratory: Positive for shortness of breath. Negative for cough.    Cardiovascular: Negative for chest pain.   Gastrointestinal: Positive for abdominal pain. Negative for nausea and vomiting.   Musculoskeletal: Negative for myalgias.   Neurological: Negative for dizziness and headaches.       Objective:   Physical Exam:   Vitals:   Temp:  [36.1 °C (97 °F)-36.6 °C (97.9 °F)] 36.2 °C (97.1 °F)  Pulse:  [78-98] 82  Resp:  [18-20] 20  BP: (114-147)/(68-92) 126/70  SpO2:  [97 %-99 %] 98 %    Physical Exam  Constitutional:       Appearance: Normal appearance.      Comments: Patient has better color in face as compared to previous interval visits     HENT:      Head: Normocephalic and atraumatic.      Right Ear: External ear normal.      Left Ear: External ear normal.      Nose: Nose normal.       Mouth/Throat:      Mouth: Mucous membranes are moist.   Eyes:      Pupils: Pupils are equal, round, and reactive to light.   Cardiovascular:      Rate and Rhythm: Normal rate and regular rhythm.      Heart sounds: Normal heart sounds.   Pulmonary:      Effort: Pulmonary effort is normal.      Breath sounds: Normal breath sounds.   Abdominal:      General: Abdomen is flat. There is no distension.      Comments: Drain site and ostomy bag appear clean       Skin:     General: Skin is warm and dry.      Capillary Refill: Capillary refill takes less than 2 seconds.   Neurological:      General: No focal deficit present.      Mental Status: He is alert.   Psychiatric:         Mood and Affect: Mood normal.         Behavior: Behavior normal.      Comments: Patient has improved mood than previously             Labs:   Recent Labs     11/20/20 0313 11/21/20  0208 11/22/20  0300   WBC 17.2* 16.5* 15.1*   RBC 4.15* 4.04* 3.83*   HEMOGLOBIN 10.6* 10.4* 9.6*   HEMATOCRIT 35.5* 34.1* 32.5*   MCV 85.5 84.4 84.9   MCH 25.5* 25.7* 25.1*   RDW 54.6* 54.5* 54.4*   PLATELETCT 200 189 182   MPV 9.7 9.4 9.8   NEUTSPOLYS 69.60 70.80 67.30   LYMPHOCYTES 15.60* 14.00* 17.70*   MONOCYTES 9.30 10.70 10.70   EOSINOPHILS 3.10 2.40 2.50   BASOPHILS 0.20 0.20 0.10     Recent Labs     11/20/20 0313 11/21/20  0208 11/22/20  0300   SODIUM 139 137 137   POTASSIUM 3.7 3.5* 3.9   CHLORIDE 97 96 97   CO2 37* 36* 37*   GLUCOSE 150* 123* 98   BUN 15 12 13     Recent Labs     11/20/20  0313 11/21/20  0208 11/22/20  0300   ALBUMIN 2.6* 2.5* 2.6*   TBILIRUBIN 0.6 0.7 0.6   ALKPHOSPHAT 79 77 81   TOTPROTEIN 5.4* 5.5* 5.1*   ALTSGPT <5 7 8   ASTSGOT 16 13 16   CREATININE 0.58 0.46* 0.61       Imaging:   [id number] VIEW   Final Result      1.  No evidence of bowel obstruction.   2.  Postoperative changes as described.      CT-ABDOMEN-PELVIS W/O   Final Result      Postsurgical changes with a left abdominal stoma. Parastomal densities may be related to  postsurgical hematoma measuring up to 2.8 x 2.1 cm.      Small amount of free fluid in the abdomen and pelvis.      Collection along the anterior spleen measures approximately 2.9 x 4.3 cm is decreased in size compared to the prior examination. Evaluation is limited by lack of intravenous contrast.      Third spacing.      Trace free intraperitoneal air is likely related to recent surgery.      Moderate left and small right pleural effusions with overlying atelectasis/consolidation.      Cortical scarring of the right kidney.      Cholelithiasis.         DX-CHEST-PORTABLE (1 VIEW)   Final Result      1.  Bilateral airspace and interstitial infiltrates.      2.  Cardiomegaly.      US-RENAL   Final Result    Examination was limited due to poor sound penetration related to body habitus.   Grossly normal appearance of the kidneys.      EC-ECHOCARDIOGRAM COMPLETE W/ CONT   Final Result      DX-CHEST-PORTABLE (1 VIEW)   Final Result      No pneumothorax identified following right subclavian line placement      Worsening atelectasis with consolidation at the bases not excluded      Stable cardiac silhouette enlargement      CT-ABDOMEN-PELVIS WITH   Final Result      Interval decrease in size of perisplenic abscess. Pigtail catheter now in place      Abscess has likely fistulous communication with the abutting splenic flexure of the colon. Colon is otherwise normal in appearance      Mild hepatosplenomegaly      Decreasing small left pleural effusion      Subacute anterior wedge compression fractures in the caudal thoracic spine      CT-DRAIN-PERITONEAL   Final Result      1.  CT guided perisplenic abscess catheter drainage.   2.  The current plan is to obtain a follow-up CT in 5-7 days..      OUTSIDE IMAGES-CT CHEST   Final Result      OUTSIDE IMAGES-CT ABDOMEN /PELVIS   Final Result      CT-ABDOMEN-PELVIS WITH   Final Result   Addendum 1 of 1   Addendum:   A prior noncontrast CT of the abdomen and pelvis from [facility] dated 10/19/2020 was made available for comparison. On the    prior study a perisplenic/subcapsular fluid collection was seen however    did not contain air at that time and    there was no discrete evidence of communication with the transverse colon.    Communication with splenic flexure of the colon and air within the    collection are new from the prior study.      Final      DX-CHEST-PORTABLE (1 VIEW)   Final Result      Slight interval interval improvement in congestive heart failure.          Assessment and Plan:    #Post op for perforation of colon with colectomy and ostomy  Patient's WBC remains elevated at 15.1, he continues to have chills and SOB, but with improvement.  Increase in WBC may be due to ongoing infection vs stress. Patient has been afebrile for the last 72 hours.  Enterococcus faecalis and candida albicans grew from surgical culture. Metronidazole was d/c due to N/V which has improved greatly.     -IV ceftriaxone, vancomycin and fluconazole continued  -Temperature should be observed carefully with discontinuation of metronidazole   -Continue oral intake of food   -Repeat CBC next AM    #A-fib with RVR  Patient was not a candidate for anticoagulation due to frequent falls from orthostatic hypotension.  Digoxin was started but is no longer to be used to due to ongoing toxicity. Patient's HR in the 70s-80s currently.     -metoprolol 75 BID  -amiodarone 200mg  -metoprolol PRN available if needed  -Continue on tele for observation         #Left Ventricular heart failure  Patient has left ventricular ejection fraction estimated to be 35% with normal size. Patient sustained SRUTHI leading to discontinuation of lisinopril and spironolactone, but now is back on these medications.     -Patient currently taking metoprolol, lisinopril, spirolactone     #Suicidal ideation  Patient disclosed to me that he has been feeling suicidal on and off.  He does not have a plan to commit suicide,  but he describes his feeling of hopelessness due to the hospital stay and reports wanting to just die sometimes. Psychiatry consulted and recommended modafinil and haloperidol.  Today, he no longer has these feelings.     -Modafinil 100mg  -Resperidone 0.5mg      #Schizophrenia  Patient has a PMH of schizophrenia before admission for which he takes olanzapine.  QTC prolongation indicated lowering of his olanzipine dose.  Patient has had increased auditory hallucinations lately that degrade him and negatively impact his hospital stay by lowering his self esteem.  Patient's auditory hallucination symptoms continued to improved after switch to haloperidol.     -Haloperidol 0.5mg     #Type 2 DM  Patient's blood glucose is well controlled with ranges under 150 per ID, blood glucose has been controlled under in the 100s.     -Continue current sliding scale insulin regimen  -BG checks every 6 hours     #QTC prolongation  Patient was found to have a QTC prolongation of 535 on 11/17.  Cause may be due to multiple medications including sertraline, olanzapine, fluconazole, linezolid and zofran.  Risk of Torsades. Now patient has QTC of 424 on 11/18 which is back to baseline. Zofran was d/c, linezolid switched to vancomycin, sertraline and olanzapine discontinued and replaced with modafinil and haloperidol. Problem resolved for now.     -patient's QTC should be observed for return      #Digoxin Toxicity   Patient was started on digoxin for CHF with normal levels of 1.5 on 11/14.  Patient began to have worsening nausea and developed vomiting and hallucinating yellow shapes beginning 11/16.  He was found to have a digoxin level >5 and digoxin was discontinued.  His levels lowered to 0.8 (11/17) .  The increase in levels may have been due to concurrent usage of fluconazole a [id number] inhibitor. Patient no longer is having hallucinations related to digoxin toxicity.     -Patient's digoxin toxicity resolved

## 2020-11-23 LAB
ALBUMIN SERPL BCP-MCNC: 2.5 G/DL (ref 3.2–4.9)
ALBUMIN/GLOB SERPL: 0.9 G/DL
ALP SERPL-CCNC: 86 U/L (ref 30–99)
ALT SERPL-CCNC: 6 U/L (ref 2–50)
ANION GAP SERPL CALC-SCNC: 4 MMOL/L (ref 7–16)
AST SERPL-CCNC: 16 U/L (ref 12–45)
BASOPHILS # BLD AUTO: 0.3 % (ref 0–1.8)
BASOPHILS # BLD: 0.04 K/UL (ref 0–0.12)
BILIRUB SERPL-MCNC: 0.5 MG/DL (ref 0.1–1.5)
BUN SERPL-MCNC: 16 MG/DL (ref 8–22)
CALCIUM SERPL-MCNC: 8 MG/DL (ref 8.5–10.5)
CHLORIDE SERPL-SCNC: 97 MMOL/L (ref 96–112)
CO2 SERPL-SCNC: 36 MMOL/L (ref 20–33)
CREAT SERPL-MCNC: 0.7 MG/DL (ref 0.5–1.4)
EOSINOPHIL # BLD AUTO: 0.38 K/UL (ref 0–0.51)
EOSINOPHIL NFR BLD: 2.8 % (ref 0–6.9)
ERYTHROCYTE [DISTWIDTH] IN BLOOD BY AUTOMATED COUNT: 56.2 FL (ref 35.9–50)
GLOBULIN SER CALC-MCNC: 2.7 G/DL (ref 1.9–3.5)
GLUCOSE BLD-MCNC: 100 MG/DL (ref 65–99)
GLUCOSE BLD-MCNC: 108 MG/DL (ref 65–99)
GLUCOSE BLD-MCNC: 117 MG/DL (ref 65–99)
GLUCOSE BLD-MCNC: 130 MG/DL (ref 65–99)
GLUCOSE BLD-MCNC: 131 MG/DL (ref 65–99)
GLUCOSE SERPL-MCNC: 104 MG/DL (ref 65–99)
HCT VFR BLD AUTO: 32.1 % (ref 42–52)
HGB BLD-MCNC: 9.4 G/DL (ref 14–18)
IMM GRANULOCYTES # BLD AUTO: 0.25 K/UL (ref 0–0.11)
IMM GRANULOCYTES NFR BLD AUTO: 1.8 % (ref 0–0.9)
LYMPHOCYTES # BLD AUTO: 2.8 K/UL (ref 1–4.8)
LYMPHOCYTES NFR BLD: 20.6 % (ref 22–41)
MCH RBC QN AUTO: 25.4 PG (ref 27–33)
MCHC RBC AUTO-ENTMCNC: 29.3 G/DL (ref 33.7–35.3)
MCV RBC AUTO: 86.8 FL (ref 81.4–97.8)
MONOCYTES # BLD AUTO: 1.49 K/UL (ref 0–0.85)
MONOCYTES NFR BLD AUTO: 11 % (ref 0–13.4)
NEUTROPHILS # BLD AUTO: 8.63 K/UL (ref 1.82–7.42)
NEUTROPHILS NFR BLD: 63.5 % (ref 44–72)
NRBC # BLD AUTO: 0 K/UL
NRBC BLD-RTO: 0 /100 WBC
PLATELET # BLD AUTO: 188 K/UL (ref 164–446)
PMV BLD AUTO: 10.2 FL (ref 9–12.9)
POTASSIUM SERPL-SCNC: 3.8 MMOL/L (ref 3.6–5.5)
PROT SERPL-MCNC: 5.2 G/DL (ref 6–8.2)
RBC # BLD AUTO: 3.7 M/UL (ref 4.7–6.1)
SODIUM SERPL-SCNC: 137 MMOL/L (ref 135–145)
WBC # BLD AUTO: 13.6 K/UL (ref 4.8–10.8)

## 2020-11-23 PROCEDURE — 82962 GLUCOSE BLOOD TEST: CPT | Mod: 91

## 2020-11-23 PROCEDURE — 99232 SBSQ HOSP IP/OBS MODERATE 35: CPT | Mod: GC | Performed by: HOSPITALIST

## 2020-11-23 PROCEDURE — 700102 HCHG RX REV CODE 250 W/ 637 OVERRIDE(OP): Performed by: INTERNAL MEDICINE

## 2020-11-23 PROCEDURE — 700102 HCHG RX REV CODE 250 W/ 637 OVERRIDE(OP): Performed by: STUDENT IN AN ORGANIZED HEALTH CARE EDUCATION/TRAINING PROGRAM

## 2020-11-23 PROCEDURE — 700111 HCHG RX REV CODE 636 W/ 250 OVERRIDE (IP): Performed by: STUDENT IN AN ORGANIZED HEALTH CARE EDUCATION/TRAINING PROGRAM

## 2020-11-23 PROCEDURE — 80053 COMPREHEN METABOLIC PANEL: CPT

## 2020-11-23 PROCEDURE — 97608 NEG PRS WND THER NDME>50SQCM: CPT

## 2020-11-23 PROCEDURE — 700111 HCHG RX REV CODE 636 W/ 250 OVERRIDE (IP): Performed by: HOSPITALIST

## 2020-11-23 PROCEDURE — 700111 HCHG RX REV CODE 636 W/ 250 OVERRIDE (IP): Performed by: INTERNAL MEDICINE

## 2020-11-23 PROCEDURE — 700105 HCHG RX REV CODE 258: Performed by: INTERNAL MEDICINE

## 2020-11-23 PROCEDURE — 85025 COMPLETE CBC W/AUTO DIFF WBC: CPT

## 2020-11-23 PROCEDURE — 700102 HCHG RX REV CODE 250 W/ 637 OVERRIDE(OP): Performed by: PSYCHIATRY & NEUROLOGY

## 2020-11-23 PROCEDURE — A9270 NON-COVERED ITEM OR SERVICE: HCPCS | Performed by: INTERNAL MEDICINE

## 2020-11-23 PROCEDURE — A9270 NON-COVERED ITEM OR SERVICE: HCPCS | Performed by: STUDENT IN AN ORGANIZED HEALTH CARE EDUCATION/TRAINING PROGRAM

## 2020-11-23 PROCEDURE — 770020 HCHG ROOM/CARE - TELE (206)

## 2020-11-23 PROCEDURE — 99233 SBSQ HOSP IP/OBS HIGH 50: CPT | Performed by: INTERNAL MEDICINE

## 2020-11-23 PROCEDURE — 700105 HCHG RX REV CODE 258: Performed by: HOSPITALIST

## 2020-11-23 PROCEDURE — A9270 NON-COVERED ITEM OR SERVICE: HCPCS | Performed by: PSYCHIATRY & NEUROLOGY

## 2020-11-23 RX ORDER — RISPERIDONE 0.5 MG/1
1 TABLET ORAL EVERY EVENING
Status: DISCONTINUED | OUTPATIENT
Start: 2020-11-23 | End: 2020-12-17 | Stop reason: HOSPADM

## 2020-11-23 RX ORDER — RISPERIDONE 0.5 MG/1
0.5 TABLET ORAL EVERY MORNING
Status: DISCONTINUED | OUTPATIENT
Start: 2020-11-24 | End: 2020-12-17 | Stop reason: HOSPADM

## 2020-11-23 RX ORDER — METOCLOPRAMIDE 10 MG/1
10 TABLET ORAL EVERY 6 HOURS PRN
Status: DISCONTINUED | OUTPATIENT
Start: 2020-11-23 | End: 2020-12-09

## 2020-11-23 RX ADMIN — LISINOPRIL 20 MG: 20 TABLET ORAL at 05:26

## 2020-11-23 RX ADMIN — METOPROLOL SUCCINATE 75 MG: 50 TABLET, EXTENDED RELEASE ORAL at 05:25

## 2020-11-23 RX ADMIN — MODAFINIL 100 MG: 100 TABLET ORAL at 05:25

## 2020-11-23 RX ADMIN — VANCOMYCIN HYDROCHLORIDE 1750 MG: 500 INJECTION, POWDER, LYOPHILIZED, FOR SOLUTION INTRAVENOUS at 05:27

## 2020-11-23 RX ADMIN — FAMOTIDINE 20 MG: 20 TABLET, FILM COATED ORAL at 05:26

## 2020-11-23 RX ADMIN — CETIRIZINE HYDROCHLORIDE 10 MG: 10 TABLET, FILM COATED ORAL at 05:28

## 2020-11-23 RX ADMIN — LEVOTHYROXINE SODIUM 112 MCG: 112 TABLET ORAL at 05:27

## 2020-11-23 RX ADMIN — SPIRONOLACTONE 25 MG: 50 TABLET ORAL at 05:28

## 2020-11-23 RX ADMIN — AMIODARONE HYDROCHLORIDE 200 MG: 200 TABLET ORAL at 05:26

## 2020-11-23 RX ADMIN — ENOXAPARIN SODIUM 60 MG: 60 INJECTION SUBCUTANEOUS at 05:27

## 2020-11-23 RX ADMIN — HYDROCODONE BITARTRATE AND ACETAMINOPHEN 1 TABLET: 10; 325 TABLET ORAL at 17:50

## 2020-11-23 RX ADMIN — CEFTRIAXONE SODIUM 2 G: 2 INJECTION, POWDER, FOR SOLUTION INTRAMUSCULAR; INTRAVENOUS at 05:27

## 2020-11-23 RX ADMIN — Medication 400 MG: at 17:42

## 2020-11-23 RX ADMIN — INSULIN GLARGINE 10 UNITS: 100 INJECTION, SOLUTION SUBCUTANEOUS at 05:42

## 2020-11-23 RX ADMIN — RISPERIDONE 1 MG: 1 TABLET ORAL at 17:42

## 2020-11-23 RX ADMIN — Medication 400 MG: at 05:27

## 2020-11-23 RX ADMIN — FLUCONAZOLE 400 MG: 200 TABLET ORAL at 05:27

## 2020-11-23 RX ADMIN — HYDROCODONE BITARTRATE AND ACETAMINOPHEN 1 TABLET: 10; 325 TABLET ORAL at 09:16

## 2020-11-23 RX ADMIN — METOPROLOL SUCCINATE 75 MG: 50 TABLET, EXTENDED RELEASE ORAL at 17:42

## 2020-11-23 RX ADMIN — ENOXAPARIN SODIUM 60 MG: 60 INJECTION SUBCUTANEOUS at 17:42

## 2020-11-23 RX ADMIN — TRAZODONE HYDROCHLORIDE 50 MG: 50 TABLET ORAL at 21:35

## 2020-11-23 ASSESSMENT — ENCOUNTER SYMPTOMS
SENSORY CHANGE: 0
ORTHOPNEA: 0
TREMORS: 0
VOMITING: 0
CONSTIPATION: 0
FOCAL WEAKNESS: 0
INSOMNIA: 1
WEAKNESS: 1
HALLUCINATIONS: 0
SHORTNESS OF BREATH: 0
DEPRESSION: 0
ROS GI COMMENTS: POOR APPETITE
ABDOMINAL PAIN: 0
SPEECH CHANGE: 0
DIAPHORESIS: 0
DIZZINESS: 0
NAUSEA: 0
CLAUDICATION: 0
FEVER: 0
ABDOMINAL PAIN: 1
CHILLS: 0
DIARRHEA: 0
BACK PAIN: 1
COUGH: 0

## 2020-11-23 ASSESSMENT — PAIN DESCRIPTION - PAIN TYPE: TYPE: ACUTE PAIN

## 2020-11-23 NOTE — WOUND TEAM
[facility]  Inpatient Services  Wound and Skin Care Evaluation    Admission Date: 11/3/2020     Last order of IP CONSULT TO WOUND CARE was found on 11/16/2020 from Hospital Encounter on 11/3/2020     HPI, PMH, SH: Reviewed    Unit where seen by Wound Team: [id number]     WOUND CONSULT/FOLLOW UP RELATED TO:   Scheduled abdominal NPWT dressing/Ostomy appliance change    Self Report / Pain Level:  Some tenderness, premedicated with po pain medication by nursing. Tolerated well.        OBJECTIVE:  Ostomy appliance leaked into vac dressing, foam and abdominal wound with liquid stool, ostomy appliance leaking all over abdomen.    WOUND TYPE, LOCATION, CHARACTERISTICS (Pressure Injuries: location, stage, POA or date identified)      Negative Pressure Wound Therapy 11/10/20 Surgical Abdomen Midline (Active)   Vacuum Serial Number [id number]    NPWT Pump Mode / Pressure Setting Ulta;Continuous;125 mmHg    Dressing Type Medium;Black Foam (Regular)    Number of Foam Pieces Used 5    Canister Changed No    NEXT Dressing Change/Treatment Date 11/30/20            Wound 11/10/20 Full Thickness Wound Abdomen Midline wound vac (Active)   Wound Image      Site Assessment Red;Brown;Yellow    Periwound Assessment Clean;Dry;Intact    Margins Attached edges;Defined edges    Closure Secondary intention    Drainage Amount Scant    Drainage Description Serosanguineous    Treatments Cleansed    Wound Cleansing Approved Wound Cleanser    Periwound Protectant Benzoin;Drape;Mepitel    Dressing Cleansing/Solutions Not Applicable    Dressing Options Wound Vac    Dressing Changed Changed    Dressing Status Clean;Dry;Intact    Dressing Change/Treatment Frequency Monday, Wednesday, Friday, and As Needed    NEXT Dressing Change/Treatment Date 11/25/20    NEXT Weekly Photo (Inpatient Only) 11/30/20    Non-staged Wound Description Full thickness    Wound Length (cm) Superior wound  13 cm    Wound Width (cm) 6.5 cm    Wound Depth (cm) 3.5 cm     Wound Surface Area (cm^2) 84.5 cm^2    Wound Volume (cm^3) 295.75 cm^3    Wound Healing % 61    Tunneling (cm) 0 cm    Undermining (cm) 0 cm    Shape oval    Wound Odor None    Exposed Structures Adipose        Inferior wound: 3 x 2.5 x 2         Lab Values:    Lab Results   Component Value Date/Time    WBC 13.6 (H) 11/23/2020 04:40 AM    RBC 3.70 (L) 11/23/2020 04:40 AM    HEMOGLOBIN 9.4 (L) 11/23/2020 04:40 AM    HEMATOCRIT 32.1 (L) 11/23/2020 04:40 AM    CREACTPROT 0.54 10/23/2018 08:02 AM    SEDRATEWES 13 10/23/2018 08:02 AM    HBA1C 7.8 (H) 11/10/2020 03:31 AM        Culture Results show:  Recent Results (from the past 720 hour(s))   CULTURE WOUND W/ GRAM STAIN    Collection Time: 11/10/20 10:39 AM    Specimen: Wound   Result Value Ref Range    Significant Indicator POS (POS)     Source WND     Site COLON PERFORATION     Culture Result - (A)     Gram Stain Result       Rare WBCs.  Rare Gram positive cocci.  Rare Gram positive rods.      Culture Result Enterococcus faecalis  Light growth   (A)     Culture Result Candida albicans  Light growth   (A)        Susceptibility    Enterococcus faecalis - ISAMAR     Daptomycin 4 Sensitive mcg/mL     Ampicillin <=2 Sensitive mcg/mL     Gent Synergy <=500 Sensitive mcg/mL     Vancomycin 1 Sensitive mcg/mL     Penicillin 8 Sensitive mcg/mL       INTERVENTIONS BY WOUND TEAM:  Colostomy and vac dressing removed, no retained foam noted. Copious amounts of wound cleanser and NS to clean wound of stool from leaking ostomy. Abdomen cleansed with no rinse foam cleanser and moist wash cloths, dried. Replaced appliance, see below. Benzoin and draped to vj wound,  Mepitel applied over staples, dry gauze to umbilicus. 1 thin piece of black foam into wound base, 1 piece to bridge the larger superior wound to the inferior wound, 3 more pieces to fill remainder of wound bed. All pieces of foam made into half thickness before being applied to wound bed. NPWT resumed at 125 mm Hg  "continuous, no leaks noted. Held abdomen together while staff RN resumed NPWT..     Interdisciplinary consultation: Patient, Doris PÉREZ. [facility] team at bedside at conclusion of dressing change, discussed need for LTACH, SNF or Rehab placement    EVALUATION / RATIONALE FOR TREATMENT: Adipose is beginning to liquefy, some turning brown, as to be expected.  Continue NPWT on abdomen to promote tissue granulation and drainage control. Will reassess left flank wound at next vac change.     Goals: Steady decrease in wound area and depth weekly.    NURSING PLAN OF CARE ORDERS (X):    Dressing changes: See Dressing Care orders: X  Skin care: See Skin Care orders: X  RN Prevention Protocol: X  Rectal tube care: See Rectal Tube Care orders:   Other orders:      WOUND TEAM PLAN OF CARE:   Dressing changes by wound team:                   Follow up 3 times weekly:                NPWT change 3 times weekly:  X - abdomen   Follow up 1-2 times weekly:    X -left flank   Follow up Bi-Monthly:                   Follow up as needed:       Other (explain):     Anticipated discharge plans:  LTACH:        SNF/Rehab:     X            Home Health Care:           Outpatient Wound Center:            Self Care:                [facility]  Inpatient Services  New Ostomy Management & Teaching     HPI:  Reviewed  PMH: Reviewed   SH: Reviewed     Subjective: \" I don't have any help at home.\"     Objective: appliance leaking into vac and all over abdomen    Colostomy 11/10/20 Transverse LUQ (Active)   Wound Image    11/11/20 1135   Stomal Appliance Assessment Leaking    Stoma Assessment Pink;Red    Stoma Shape Budded Greater Than One Inch    Stoma Size (in) 2    Peristomal Assessment Red    Mucocutaneous Junction Intact    Treatment Appliance Changed;Site care    Peristomal Protectant Paste Ring;No Sting Skin Prep    Stomal Appliance Paste Ring, 2\";2 3/4\" (70mm) CTF    Output (mL) 300 mL    Output Color Brown    WOUND RN ONLY - Stomal " "Appliance  2 Piece;2 3/4\" (70mm) CTF;Paste Ring, 2\"    Appliance (Pouch) # [id number]    Appliance Brand Daisy    Appliance Supplier [employer]                         Ostomy Appliance (type and size):  2 piece 2 3/4\" with paste ring     Interventions:  Previous appliance removed, cleansed skin with warm wash cloth, dried. Made new template, cut barrier to fit, trimming edge closest to abdominal wound. Applied paste ring to vj stomal skin and barrier then applied, rubbed to adhere. Snapped on pouch and closed end.                  Pt education: Patient did not complete hands on today. Patient states that he burps it sometimes. There is some concern that he may pop off the barrier to burp it but doesn't have the hand strength to snap it back together properly. Questions and concerns addressed.      Evaluation:  Due to body habitus and lack of mobility, it seems very unlikely that he will be able to change this appliance completely on his own. He may be able to observe more with a mirror but he will always need some assistance. At this point he is not mobilizing so he is not able to stand at a mirror and attempt to change appliance.  Patient likely to discharge to SNF or Rehab. Stoma is viable and producing adequate amounts of stool.     Plan: Ostomy nurses to continue to follow for ostomy needs and teaching      Anticipated discharge needs: Supplies, supplier information, possible HH or outpatient ostomy clinic           "

## 2020-11-23 NOTE — DISCHARGE PLANNING
Agency/Facility Name: [facility]   Outcome: Left vmail for admissions     Agency/Facility Name: [facility]   Spoke To: Julia   Outcome: Referral under review    PT accepted to [facility]

## 2020-11-23 NOTE — CARE PLAN
Problem: Safety  Goal: Will remain free from falls  Outcome: PROGRESSING AS EXPECTED  Intervention: Implement fall precautions  Flowsheets  Taken 11/23/2020 1110 by Doris Martin R.N.  IV Pole on Same Side of Bed as Bathroom: Yes  Taken 11/23/2020 0730 by Doris Martin R.N.  Environmental Precautions:   Treaded Slipper Socks on Patient   Personal Belongings, Wastebasket, Call Bell etc. in Easy Reach   Transferred to Stronger Side   Report Given to Other Health Care Providers Regarding Fall Risk   Bed in Low Position   Communication Sign for Patients & Families   Mobility Assessed & Appropriate Sign Placed  Taken 11/20/2020 0800 by Shirlene Murguia R.N.  Chair/Bed Strip Alarm: Yes - Alarm On  Taken 11/9/2020 1438 by Doris Martin R.N.  Bed Alarm: Yes - Alarm On  Bedrails: Bedrails Closest to Bathroom Down     Problem: Skin Integrity  Goal: Risk for impaired skin integrity will decrease  Outcome: PROGRESSING AS EXPECTED  Intervention: Implement precautions to protect skin integrity in collaboration with the interdisciplinary team  Flowsheets  Taken 11/23/2020 1000 by Doris Martin R.N.  Patient Turns / Repositioning: Right Side  Taken 11/23/2020 0730 by Doris Martin R.N.  Skin Preventative Measures:   Pillows in Use for Support / Positioning   Pillows in Use to Float Heels   Silicone Oxygen Tubing in Use   Gray Foam for Oxygen Tubing (Pad ear protector)   Remove Glasses While Patient is Sleeping  Bed Types: Bariatric Therapy Bed with Low Air Loss Mattress  Friction Interventions: Draw Sheet / Pad Used for Repositioning  PT / OT Involved in Care:   Physical Therapy Involved   Occupational Therapy Involved  Moisturizers/Barriers:   Barrier Cream   Moisturizer  Containment Devices: (Colostomy) --  Protocols: Incontinence Associated Dermatitis Protocol in Place  Patient is Receiving Nutrition: Oral Intake Adequate  Vitamin Therapy in Use: No  Activity:  Bed  Assistance / Tolerance for Turning/Repositioning: Assistance of Two or More  Taken 11/23/2020 0002 by Brandie Day R.N.  Nutrition Consult Ordered: No, Consult has Already been Placed

## 2020-11-23 NOTE — PROGRESS NOTES
Pharmacy Kinetics 55 y.o. male on vancomycin day # 7  11/23/2020    Currently Dose: Vancomycin 1750 mg iv q24hr (~10 mg/kg/dose)  Received Load Dose: Yes     Indication for Treatment: IAI/abscess  Provider Specified End Date: None  ID Service Following: Yes     Pertinent history per medical record: Admitted on 11/3/2020 for concern of abdominal pain after surgical intervention. Concern for subsequent colonic perforation and possible abscess. ID and surgery consulted.     Other antibiotics: ceftriaxone 2 gm iv q24h, fluconazole 400 mg po q24h     Allergies: Daptomycin, Pcn [penicillins], Unasyn [ampicillin-sulbactam sodium], and Vancomycin      List concerns for accumulation of vancomycin: BMI ~57, BUN:SCr > 20:1     Pertinent cultures to date:   Results     Procedure Component Value Units Date/Time    URINALYSIS [[id number]]  (Abnormal) Collected: 11/17/20 2302    Order Status: Completed Specimen: Urine, Clean Catch Updated: 11/17/20 2350     Color Yellow     Character Cloudy     Specific Gravity 1.018     Ph 5.0     Glucose Negative mg/dL      Ketones Negative mg/dL      Protein 30 mg/dL      Bilirubin Negative     Urobilinogen, Urine 0.2     Nitrite Negative     Leukocyte Esterase Negative     Occult Blood Small     Micro Urine Req Microscopic    Narrative:      Collected By:[id number] ELDER SENA        MRSA nares swab if pneumonia is a concern (ordered/positive/negative/n-a): n/a    Recent Labs     11/21/20 0208 11/22/20  0300 11/23/20  0440   WBC 16.5* 15.1* 13.6*   NEUTSPOLYS 70.80 67.30 63.50     Recent Labs     11/21/20  0208 11/22/20  0300 11/23/20  0440   BUN 12 13 16   CREATININE 0.46* 0.61 0.70   ALBUMIN 2.5* 2.6* 2.5*     Recent Labs     11/22/20  0300   VANCOTROUGH 14.0     Intake/Output Summary (Last 24 hours) at 11/23/2020 1543  Last data filed at 11/23/2020 1016  Gross per 24 hour   Intake 120 ml   Output 650 ml   Net -530 ml      /74   Pulse 87   Temp 36 °C (96.8 °F) (Temporal)   Resp 18    Ht 1.829 m (6')   Wt (!) 191 kg (421 lb 1.3 oz)   SpO2 98%  Temp (24hrs), Av.3 °C (97.3 °F), Min:36 °C (96.8 °F), Max:36.9 °C (98.5 °F)    Estimated Creatinine Clearance: 202.6 mL/min (by C-G formula based on SCr of 0.7 mg/dL).    A/P   1. Vancomycin dose change: not indicated   2. Next vancomycin level: 20 @0530 (ordered)  3. Goal trough: 10-15 mcg/mL  4. Comments: VS stable. Afebrile. WBC elevated/improved. SCr increased (near baseline range). BMI likely to make CrCl inaccurate. No new microbiology. Vancomycin level in place prior to AM dose 20 to assess clearance. No current charted issues with infusion toleration. CMP and CBC with AM labs. Pharmacy will continue to follow.    Eric Peacock, PharmD

## 2020-11-23 NOTE — CARE PLAN
Problem: Safety  Goal: Will remain free from injury  Outcome: PROGRESSING AS EXPECTED  Goal: Will remain free from falls  Outcome: PROGRESSING AS EXPECTED  Safety/Fall precautions in place. Bed in lowest/locked position. Bed alarm on.     Problem: Skin Integrity  Goal: Risk for impaired skin integrity will decrease  Outcome: PROGRESSING AS EXPECTED   Q2 turns in place. Wound protocols in place.

## 2020-11-23 NOTE — DISCHARGE PLANNING
[facility] Transitional Care Coordination     Referral from:  Dr. Ortega   Facesheet indicates: Medicaid    Potential Rehab Diagnosis: Debility     Chart review indicates patient has on going medical management and  therapy needs to possibly meet inpatient rehab facility criteria with the goal of returning to community.    D/C support: Limited per chart review currently no community discharge destination. Potential group home.      Physiatry consultation denied per protocol.        Last Covid test date.11/09/20      Thank you for the referral.      Current documentation does not support tolerance and participation in an IRF level of care. Limited support to return to the community. Anticipate a limited reasonable and practical improvement with in the time allotted for IRF level of care. Anticipate skilled nursing when medically cleared.

## 2020-11-23 NOTE — CARE PLAN
Problem: Nutritional:  Goal: Achieve adequate nutritional intake  Description: Advance diet as tolerated past clear liquids. Patient will consume >50% of meals.  Outcome: PROGRESSING AS EXPECTED     Pt reports eating 50% of meals, although little intake recorded. Boost plus TID and Greek Yogurt consumption per pt.

## 2020-11-23 NOTE — PROGRESS NOTES
Stable  Stoma viable with output  Vac in place  Benign exam  P  Continue mgt per [employer] team  Surgery signing off-please re-consult prn [phone]  Discussed with Dr. Myrick

## 2020-11-23 NOTE — PROGRESS NOTES
Received report from day staff. Assumed pt care. Pain level 7/10, in left lower abdomen, pain management options discussed. AOX4. POC discussed. Tele monitor in place. Call light within reach, bed in lowest position, and personal items accessible.

## 2020-11-24 ENCOUNTER — APPOINTMENT (OUTPATIENT)
Dept: RADIOLOGY | Facility: MEDICAL CENTER | Age: 55
DRG: 329 | End: 2020-11-24
Attending: STUDENT IN AN ORGANIZED HEALTH CARE EDUCATION/TRAINING PROGRAM
Payer: MEDICAID

## 2020-11-24 LAB
ALBUMIN SERPL BCP-MCNC: 2.3 G/DL (ref 3.2–4.9)
ALBUMIN/GLOB SERPL: 0.7 G/DL
ALP SERPL-CCNC: 96 U/L (ref 30–99)
ALT SERPL-CCNC: <5 U/L (ref 2–50)
ANION GAP SERPL CALC-SCNC: 4 MMOL/L (ref 7–16)
AST SERPL-CCNC: 20 U/L (ref 12–45)
BASOPHILS # BLD AUTO: 0.3 % (ref 0–1.8)
BASOPHILS # BLD: 0.05 K/UL (ref 0–0.12)
BILIRUB SERPL-MCNC: 0.6 MG/DL (ref 0.1–1.5)
BUN SERPL-MCNC: 18 MG/DL (ref 8–22)
CALCIUM SERPL-MCNC: 8.2 MG/DL (ref 8.5–10.5)
CHLORIDE SERPL-SCNC: 96 MMOL/L (ref 96–112)
CO2 SERPL-SCNC: 35 MMOL/L (ref 20–33)
CREAT SERPL-MCNC: 0.75 MG/DL (ref 0.5–1.4)
EOSINOPHIL # BLD AUTO: 0.44 K/UL (ref 0–0.51)
EOSINOPHIL NFR BLD: 2.6 % (ref 0–6.9)
ERYTHROCYTE [DISTWIDTH] IN BLOOD BY AUTOMATED COUNT: 54.6 FL (ref 35.9–50)
GLOBULIN SER CALC-MCNC: 3.1 G/DL (ref 1.9–3.5)
GLUCOSE BLD-MCNC: 133 MG/DL (ref 65–99)
GLUCOSE BLD-MCNC: 96 MG/DL (ref 65–99)
GLUCOSE BLD-MCNC: 97 MG/DL (ref 65–99)
GLUCOSE SERPL-MCNC: 139 MG/DL (ref 65–99)
HCT VFR BLD AUTO: 32.4 % (ref 42–52)
HGB BLD-MCNC: 9.6 G/DL (ref 14–18)
IMM GRANULOCYTES # BLD AUTO: 0.22 K/UL (ref 0–0.11)
IMM GRANULOCYTES NFR BLD AUTO: 1.3 % (ref 0–0.9)
LYMPHOCYTES # BLD AUTO: 1.87 K/UL (ref 1–4.8)
LYMPHOCYTES NFR BLD: 11.1 % (ref 22–41)
MCH RBC QN AUTO: 25.2 PG (ref 27–33)
MCHC RBC AUTO-ENTMCNC: 29.6 G/DL (ref 33.7–35.3)
MCV RBC AUTO: 85 FL (ref 81.4–97.8)
MONOCYTES # BLD AUTO: 1.68 K/UL (ref 0–0.85)
MONOCYTES NFR BLD AUTO: 10 % (ref 0–13.4)
NEUTROPHILS # BLD AUTO: 12.52 K/UL (ref 1.82–7.42)
NEUTROPHILS NFR BLD: 74.7 % (ref 44–72)
NRBC # BLD AUTO: 0 K/UL
NRBC BLD-RTO: 0 /100 WBC
PLATELET # BLD AUTO: 220 K/UL (ref 164–446)
PMV BLD AUTO: 10.8 FL (ref 9–12.9)
POTASSIUM SERPL-SCNC: 4.4 MMOL/L (ref 3.6–5.5)
PROT SERPL-MCNC: 5.4 G/DL (ref 6–8.2)
RBC # BLD AUTO: 3.81 M/UL (ref 4.7–6.1)
SODIUM SERPL-SCNC: 135 MMOL/L (ref 135–145)
VANCOMYCIN TROUGH SERPL-MCNC: 15.1 UG/ML (ref 10–20)
WBC # BLD AUTO: 16.8 K/UL (ref 4.8–10.8)

## 2020-11-24 PROCEDURE — 700102 HCHG RX REV CODE 250 W/ 637 OVERRIDE(OP): Performed by: STUDENT IN AN ORGANIZED HEALTH CARE EDUCATION/TRAINING PROGRAM

## 2020-11-24 PROCEDURE — 97530 THERAPEUTIC ACTIVITIES: CPT

## 2020-11-24 PROCEDURE — 85025 COMPLETE CBC W/AUTO DIFF WBC: CPT

## 2020-11-24 PROCEDURE — A9270 NON-COVERED ITEM OR SERVICE: HCPCS | Performed by: STUDENT IN AN ORGANIZED HEALTH CARE EDUCATION/TRAINING PROGRAM

## 2020-11-24 PROCEDURE — 80053 COMPREHEN METABOLIC PANEL: CPT

## 2020-11-24 PROCEDURE — 700105 HCHG RX REV CODE 258: Performed by: INTERNAL MEDICINE

## 2020-11-24 PROCEDURE — 99233 SBSQ HOSP IP/OBS HIGH 50: CPT | Performed by: INTERNAL MEDICINE

## 2020-11-24 PROCEDURE — 74177 CT ABD & PELVIS W/CONTRAST: CPT

## 2020-11-24 PROCEDURE — 700111 HCHG RX REV CODE 636 W/ 250 OVERRIDE (IP): Performed by: STUDENT IN AN ORGANIZED HEALTH CARE EDUCATION/TRAINING PROGRAM

## 2020-11-24 PROCEDURE — 700102 HCHG RX REV CODE 250 W/ 637 OVERRIDE(OP): Performed by: PSYCHIATRY & NEUROLOGY

## 2020-11-24 PROCEDURE — 700111 HCHG RX REV CODE 636 W/ 250 OVERRIDE (IP): Performed by: HOSPITALIST

## 2020-11-24 PROCEDURE — 700105 HCHG RX REV CODE 258: Performed by: HOSPITALIST

## 2020-11-24 PROCEDURE — A9270 NON-COVERED ITEM OR SERVICE: HCPCS | Performed by: PSYCHIATRY & NEUROLOGY

## 2020-11-24 PROCEDURE — 82962 GLUCOSE BLOOD TEST: CPT

## 2020-11-24 PROCEDURE — 700102 HCHG RX REV CODE 250 W/ 637 OVERRIDE(OP): Performed by: INTERNAL MEDICINE

## 2020-11-24 PROCEDURE — 770020 HCHG ROOM/CARE - TELE (206)

## 2020-11-24 PROCEDURE — A9270 NON-COVERED ITEM OR SERVICE: HCPCS | Performed by: INTERNAL MEDICINE

## 2020-11-24 PROCEDURE — 99233 SBSQ HOSP IP/OBS HIGH 50: CPT | Mod: GC | Performed by: HOSPITALIST

## 2020-11-24 PROCEDURE — 700117 HCHG RX CONTRAST REV CODE 255: Performed by: STUDENT IN AN ORGANIZED HEALTH CARE EDUCATION/TRAINING PROGRAM

## 2020-11-24 PROCEDURE — 80202 ASSAY OF VANCOMYCIN: CPT

## 2020-11-24 PROCEDURE — 97535 SELF CARE MNGMENT TRAINING: CPT

## 2020-11-24 PROCEDURE — 700111 HCHG RX REV CODE 636 W/ 250 OVERRIDE (IP): Performed by: INTERNAL MEDICINE

## 2020-11-24 RX ORDER — DIPHENHYDRAMINE HCL 25 MG
25 TABLET ORAL EVERY 8 HOURS PRN
Status: DISCONTINUED | OUTPATIENT
Start: 2020-11-24 | End: 2020-12-17 | Stop reason: HOSPADM

## 2020-11-24 RX ADMIN — Medication 400 MG: at 05:27

## 2020-11-24 RX ADMIN — CEFTRIAXONE SODIUM 2 G: 2 INJECTION, POWDER, FOR SOLUTION INTRAMUSCULAR; INTRAVENOUS at 05:28

## 2020-11-24 RX ADMIN — METOPROLOL SUCCINATE 75 MG: 50 TABLET, EXTENDED RELEASE ORAL at 17:46

## 2020-11-24 RX ADMIN — FAMOTIDINE 20 MG: 20 TABLET, FILM COATED ORAL at 05:28

## 2020-11-24 RX ADMIN — TRAZODONE HYDROCHLORIDE 50 MG: 50 TABLET ORAL at 20:26

## 2020-11-24 RX ADMIN — FLUCONAZOLE 400 MG: 200 TABLET ORAL at 05:27

## 2020-11-24 RX ADMIN — AMIODARONE HYDROCHLORIDE 200 MG: 200 TABLET ORAL at 05:27

## 2020-11-24 RX ADMIN — LISINOPRIL 20 MG: 20 TABLET ORAL at 05:27

## 2020-11-24 RX ADMIN — HYDROCODONE BITARTRATE AND ACETAMINOPHEN 1 TABLET: 10; 325 TABLET ORAL at 17:46

## 2020-11-24 RX ADMIN — RISPERIDONE 0.5 MG: 1 TABLET ORAL at 05:28

## 2020-11-24 RX ADMIN — IOHEXOL 100 ML: 350 INJECTION, SOLUTION INTRAVENOUS at 11:55

## 2020-11-24 RX ADMIN — Medication 400 MG: at 17:46

## 2020-11-24 RX ADMIN — LEVOTHYROXINE SODIUM 112 MCG: 112 TABLET ORAL at 05:28

## 2020-11-24 RX ADMIN — HYDROCODONE BITARTRATE AND ACETAMINOPHEN 1 TABLET: 10; 325 TABLET ORAL at 08:16

## 2020-11-24 RX ADMIN — INSULIN GLARGINE 10 UNITS: 100 INJECTION, SOLUTION SUBCUTANEOUS at 05:56

## 2020-11-24 RX ADMIN — MODAFINIL 100 MG: 100 TABLET ORAL at 05:28

## 2020-11-24 RX ADMIN — RISPERIDONE 1 MG: 1 TABLET ORAL at 17:47

## 2020-11-24 RX ADMIN — VANCOMYCIN HYDROCHLORIDE 1750 MG: 500 INJECTION, POWDER, LYOPHILIZED, FOR SOLUTION INTRAVENOUS at 06:00

## 2020-11-24 RX ADMIN — ENOXAPARIN SODIUM 60 MG: 60 INJECTION SUBCUTANEOUS at 05:28

## 2020-11-24 RX ADMIN — SPIRONOLACTONE 25 MG: 50 TABLET ORAL at 05:27

## 2020-11-24 RX ADMIN — CETIRIZINE HYDROCHLORIDE 10 MG: 10 TABLET, FILM COATED ORAL at 05:27

## 2020-11-24 RX ADMIN — METOPROLOL SUCCINATE 75 MG: 50 TABLET, EXTENDED RELEASE ORAL at 05:27

## 2020-11-24 ASSESSMENT — ENCOUNTER SYMPTOMS
ABDOMINAL PAIN: 0
CONSTIPATION: 0
SORE THROAT: 0
FOCAL WEAKNESS: 0
INSOMNIA: 0
VOMITING: 0
DIARRHEA: 0
SEIZURES: 0
NERVOUS/ANXIOUS: 0
PALPITATIONS: 0
CHILLS: 0
COUGH: 0
FEVER: 0
ROS GI COMMENTS: POOR APPETITE
SHORTNESS OF BREATH: 0
HEMOPTYSIS: 0
CLAUDICATION: 0
ABDOMINAL PAIN: 1
SHORTNESS OF BREATH: 1
MYALGIAS: 0
SPUTUM PRODUCTION: 0
DIZZINESS: 0
NAUSEA: 0
WEAKNESS: 1
MEMORY LOSS: 0

## 2020-11-24 ASSESSMENT — COGNITIVE AND FUNCTIONAL STATUS - GENERAL
TURNING FROM BACK TO SIDE WHILE IN FLAT BAD: UNABLE
MOVING FROM LYING ON BACK TO SITTING ON SIDE OF FLAT BED: UNABLE
MOVING TO AND FROM BED TO CHAIR: UNABLE
WALKING IN HOSPITAL ROOM: TOTAL
DRESSING REGULAR UPPER BODY CLOTHING: A LITTLE
TOILETING: TOTAL
DRESSING REGULAR LOWER BODY CLOTHING: TOTAL
SUGGESTED CMS G CODE MODIFIER DAILY ACTIVITY: CL
MOBILITY SCORE: 6
SUGGESTED CMS G CODE MODIFIER MOBILITY: CN
HELP NEEDED FOR BATHING: A LOT
DAILY ACTIVITIY SCORE: 13
PERSONAL GROOMING: A LITTLE
CLIMB 3 TO 5 STEPS WITH RAILING: TOTAL
EATING MEALS: A LITTLE
STANDING UP FROM CHAIR USING ARMS: TOTAL

## 2020-11-24 ASSESSMENT — PAIN DESCRIPTION - PAIN TYPE
TYPE: ACUTE PAIN

## 2020-11-24 ASSESSMENT — GAIT ASSESSMENTS: GAIT LEVEL OF ASSIST: UNABLE TO PARTICIPATE

## 2020-11-24 ASSESSMENT — FIBROSIS 4 INDEX: FIB4 SCORE: 2.44

## 2020-11-24 NOTE — PROGRESS NOTES
Infectious Disease Progress Note    Author: Cristy Holley M.D. Date & Time of service: 11/24/2020  11:24 AM    Chief Complaint:  Intrabdominal abscess and bacteremia     Interval History:   55 y.o. diabetic male admitted 11/3/2020.+ A. fib, CHD, and severe morbid obesity.  Seen by ID in the past for right foot abscess with cultures positive for MRSA and Proteus.  s/p GLF approximately 1 month ago and was admitted to [facility] with left upper quadrant pain.  CT at the time revealed a subcapsular splenic hematoma. Presented to ED complaining of abdominal pain.  11/9 AF WBC 9.4 Less pain in side but remains tender to touch. Denies SE Zyvox-counseled about sxs serotonin syndrome  11/11 T-max 99.7 WBC 15.4 patient underwent colectomy, colostomy placement and I&D of flank wound with wound VAC placement on 11/10/2020 by Dr. Desouza.  Postop course complicated by hypotension and transferred to the ICU.  On pressors.  Patient complaining of shortness of breath as well as lightheadedness.  Also has postop abdominal pain.  11/12 afebrile WBC 13.3.  Patient weaned off pressors overnight.  Operative cultures growing group D Enterococcus and yeast.  Patient feeling better today.  Improved shortness of breath.  Abdominal pain stable  11/13 afebrile WBC 13.8 patient continues to feel better.  He is inquiring when he can have food by mouth.  11/14 afebrile WBC 9.9.  Patient feeling a bit better today.  His diet has been slowly advanced.  He is however complaining of some sharp abdominal pain when he coughs  11/16  AF, O2 RA, abdominal pain and poor appetite.   11/17 AF, O2 1 L NC , WBC 19.1. Drain output 465 cc yesterday. Ongoing nausea, poor appetite and abdomen is quite tender on palpation.  Will broaden antibiotics.   11/18 AF, O2 1 L NC, WBC 18.1, Drain output- 610 cc. Patient still has abdominal pain and significant nausea.  Examined abdominal wound photos and discussed with wound care team.  He has a large wound  but clean appearing base with granulation tissue.  No obvious infection in the surrounding tissues.  Left side wound with packing in place, ongoing drainage but clearing .    AF, tachycardia and hypertensive , WBC 17.2, drain output-205 cc yesterday. C/o nausea and agree with plan to transition Flagyl to IV to see if this helps.     AF WBC 13.6 feels OK-abd pain controlled. Poor appetite and not eating much. Denies SE abx-nausea resolved   AF WBC 16 no new complaints-no new cough, dyspnea and abd pain the same. Appetite so-so.     Review of Systems:  Review of Systems   Constitutional: Negative for chills and fever.   Respiratory: Positive for shortness of breath. Negative for cough, hemoptysis and sputum production.    Cardiovascular: Negative for chest pain.   Gastrointestinal: Positive for abdominal pain. Negative for constipation, diarrhea, nausea and vomiting.        Poor appetite   All other systems reviewed and are negative.      Hemodynamics:  Temp (24hrs), Av.7 °C (98 °F), Min:36 °C (96.8 °F), Max:37.1 °C (98.8 °F)  Temperature: 37.1 °C (98.8 °F)  Pulse  Av.1  Min: 51  Max: 161   Blood Pressure: 124/61      Physical Exam:  Physical Exam  Vitals signs and nursing note reviewed.   Constitutional:       General: He is not in acute distress.     Appearance: He is obese. He is not ill-appearing, toxic-appearing or diaphoretic.   HENT:      Nose: No rhinorrhea.   Eyes:      Extraocular Movements: Extraocular movements intact.      Pupils: Pupils are equal, round, and reactive to light.   Neck:      Musculoskeletal: No neck rigidity.   Cardiovascular:      Rate and Rhythm: Tachycardia present. Rhythm irregular.   Pulmonary:      Effort: Pulmonary effort is normal. No respiratory distress.      Breath sounds: No stridor.   Abdominal:      General: There is no distension.      Palpations: Abdomen is soft.      Tenderness: There is abdominal tenderness. There is no guarding.      Comments:  Wound VAC in place, no sign infection in surrounding tissue  Ostomy     Musculoskeletal:      Right lower leg: Edema present.      Left lower leg: Edema present.   Lymphadenopathy:      Cervical: No cervical adenopathy.   Skin:     General: Skin is warm.      Coloration: Skin is not jaundiced.      Findings: Bruising present.   Neurological:      General: No focal deficit present.      Mental Status: He is alert and oriented to person, place, and time.      Cranial Nerves: No cranial nerve deficit.   Psychiatric:         Mood and Affect: Mood normal.         Behavior: Behavior normal.         Meds:    Current Facility-Administered Medications:   •  metoclopramide  •  risperiDONE  •  risperiDONE  •  magnesium oxide  •  enoxaparin (LOVENOX) injection  •  modafinil  •  levothyroxine  •  HYDROcodone/acetaminophen  •  prochlorperazine  •  metoprolol SR  •  lisinopril  •  amiodarone  •  traZODone  •  vancomycin  •  Metoprolol Tartrate  •  Respiratory Therapy Consult  •  MD Alert...Vancomycin per Pharmacy  •  cefTRIAXone (ROCEPHIN) IV  •  spironolactone  •  cetirizine  •  famotidine  •  fluconazole  •  insulin glargine  •  insulin regular **AND** POC Blood Glucose **AND** NOTIFY MD and PharmD **AND** glucose **AND** dextrose 50%  •  albuterol    Labs:  Recent Labs     11/22/20 0300 11/23/20 0440 11/24/20  0530   WBC 15.1* 13.6* 16.8*   RBC 3.83* 3.70* 3.81*   HEMOGLOBIN 9.6* 9.4* 9.6*   HEMATOCRIT 32.5* 32.1* 32.4*   MCV 84.9 86.8 85.0   MCH 25.1* 25.4* 25.2*   RDW 54.4* 56.2* 54.6*   PLATELETCT 182 188 220   MPV 9.8 10.2 10.8   NEUTSPOLYS 67.30 63.50 74.70*   LYMPHOCYTES 17.70* 20.60* 11.10*   MONOCYTES 10.70 11.00 10.00   EOSINOPHILS 2.50 2.80 2.60   BASOPHILS 0.10 0.30 0.30     Recent Labs     11/22/20  0300 11/23/20 0440 11/24/20  0530   SODIUM 137 137 135   POTASSIUM 3.9 3.8 4.4   CHLORIDE 97 97 96   CO2 37* 36* 35*   GLUCOSE 98 104* 139*   BUN 13 16 18     Recent Labs     11/22/20  0300 11/23/20  0440  11/24/20  0530   ALBUMIN 2.6* 2.5* 2.3*   TBILIRUBIN 0.6 0.5 0.6   ALKPHOSPHAT 81 86 96   TOTPROTEIN 5.1* 5.2* 5.4*   ALTSGPT 8 6 <5   ASTSGOT 16 16 20   CREATININE 0.61 0.70 0.75       Imaging:  Ct-abdomen-pelvis W/o    Result Date: 11/17/2020 11/17/2020 1:51 PM HISTORY/REASON FOR EXAM:  Nausea/vomiting; elevated WBC; R/O abscess. TECHNIQUE/EXAM DESCRIPTION: CT scan of the abdomen and pelvis without contrast. Noncontrast helical scanning was obtained from the diaphragmatic domes through the pubic symphysis. Low dose optimization technique was utilized for this CT exam including automated exposure control and adjustment of the mA and/or kV according to patient size. COMPARISON: 11/8/2020. FINDINGS: There is a moderate left and small right pleural effusion with overlying atelectasis/consolidation. Trace free air is seen in the abdomen. Evaluation of parenchymal and vascular structures is limited by the lack of intravenous contrast. The liver appears  unremarkable. There are gallstones within the gallbladder. There is hypodensity along the anterior aspect of the spleen measuring approximately 4.3 x 2.9 cm. No adrenal mass is identified. There is right renal atrophy with cortical scarring. There is no  evidence of hydronephrosis. Pancreas appears unremarkable. Aorta is not aneurysmal. There are small inguinal, retroperitoneal and mesenteric lymph nodes. There is no evidence of bowel obstruction. Visualized appendix appears unremarkable. There is a left abdominal stoma. There is parastomal density measuring up to 2.8 x 2.1 cm likely representing a postsurgical hematoma. There is a small amount of free fluid in the abdomen and pelvis. Bladder is mildly distended. There is a surgical drain in the left abdomen. There is third spacing. Degenerative changes are seen in the spine. Compression deformities of T11 and T12 are again noted. Bones are demineralized.     Postsurgical changes with a left abdominal stoma. Parastomal  densities may be related to postsurgical hematoma measuring up to 2.8 x 2.1 cm. Small amount of free fluid in the abdomen and pelvis. Collection along the anterior spleen measures approximately 2.9 x 4.3 cm is decreased in size compared to the prior examination. Evaluation is limited by lack of intravenous contrast. Third spacing. Trace free intraperitoneal air is likely related to recent surgery. Moderate left and small right pleural effusions with overlying atelectasis/consolidation. Cortical scarring of the right kidney. Cholelithiasis.     Ct-abdomen-pelvis With    Result Date: 11/9/2020 11/8/2020 11:34 PM HISTORY/REASON FOR EXAM:  Abdominal infection suspected; Has perisplenic abscess with possible connection to bowel TECHNIQUE/EXAM DESCRIPTION: CT of the abdomen and pelvis with contrast. Contrast-enhanced helical scanning was obtained from the diaphragmatic domes through the pubic symphysis following the bolus administration of 100 mL of nonionic contrast (Omnipaque 350) without complication. Low dose optimization technique was utilized for this CT exam including automated exposure control and adjustment of the mA and/or kV according to patient size. COMPARISON: 11/3/2020 CT FINDINGS: The visualized lung bases show diminished small left pleural fluid that layers dependently. There is similar middle and left lower lobe atelectasis . Similar multichamber cardiac enlargement CT Abdomen: There is a new pigtail catheter in the anterior perisplenic fluid collection. The collection does diminish in size now measuring 58 x 33 from 79 x 40 mm. There is still gas within it and it is in direct contact with the splenic flexure of the colon. A small amount of gas is also seen in the more superficial soft tissues inferior to the pigtail catheter which crosses the ninth intercostal space No new abscess is detected Contrast is present in the small bowel but not yet in the transverse colon. No contrast extravasation is seen.  No abnormal bowel dilatation The pancreas, adrenal glands and kidneys enhance normally. Mild hepatosplenomegaly Some low-density in the gallbladder likely indicating cholelithiasis There is no lymphadenopathy or aneurysmal change of the aorta. Compression deformities in the spine are again seen with subacute fractures resulting in anterior wedging at T11 and T12 There is diffuse fat stranding indicating anasarca CT Pelvis: Visualized pelvic structures are unremarkable. Prostate is within normal limits for age. No lymphadenopathy. There is no free air or free fluid.     Interval decrease in size of perisplenic abscess. Pigtail catheter now in place Abscess has likely fistulous communication with the abutting splenic flexure of the colon. Colon is otherwise normal in appearance Mild hepatosplenomegaly Decreasing small left pleural effusion Subacute anterior wedge compression fractures in the caudal thoracic spine    Ct-abdomen-pelvis With    Addendum Date: 11/5/2020    Addendum: A prior noncontrast CT of the abdomen and pelvis from [facility] dated 10/19/2020 was made available for comparison. On the prior study a perisplenic/subcapsular fluid collection was seen however did not contain air at that time and there was no discrete evidence of communication with the transverse colon. Communication with splenic flexure of the colon and air within the collection are new from the prior study.    Result Date: 11/5/2020  11/3/2020 4:02 PM HISTORY/REASON FOR EXAM:  Abd pain, unspecified; IV contrast only. TECHNIQUE/EXAM DESCRIPTION:   CT scan of the abdomen and pelvis with contrast. Contrast-enhanced helical scanning was obtained from the diaphragmatic domes through the pubic symphysis following the bolus administration of nonionic contrast without complication. 100 mL of Omnipaque 350 nonionic contrast was administered without complication. Low dose optimization technique was utilized for this CT exam  including automated exposure control and adjustment of the mA and/or kV according to patient size. COMPARISON: 9/26/2018 FINDINGS: Chest Base: Small left pleural effusion with mild bibasilar atelectasis. Heart is mildly enlarged. Liver:  Diffuse hypoattenuation of the liver suggesting fatty infiltration. Moderately enlarged. Gallbladder: Cholelithiasis. Biliary tract: Nondilated. Pancreas: Normal. Spleen: There is a rim-enhancing perisplenic fluid collection which contains air and which communicates with the splenic flexure of the colon. This most likely represents an abscess and measures about 8.7 x 7.9 cm. The anterior aspect of the spleen is nonenhancing with irregular margins could be related to infection of splenic parenchyma or infarction. Adrenals: Normal. Kidneys and Collecting Systems:  There is mild right renal cortical scarring. No hydronephrosis. No renal mass. Gastrointestinal tract:  No bowel obstruction. The appendix is normal. Peritoneum: No free air or free fluid. Reproductive organs:  Normal. Bladder:  Normal. Vessels:  Normal caliber. Lymph  Nodes:  No lymphadenopathy. Abdominal wall: Within normal limits. Bones:  Age indeterminate possibly subacute to chronic T11 and T12 superior endplate compression fractures.     1.  Peripherally enhancing perisplenic fluid collection which communicates with the splenic flexure of the colon and which contains foci of air is most consistent with an abscess/infected fluid collection. There is nonenhancement of the anterior aspect of the spleen suggesting infection/infarction. Surgical consultation is recommended. 2.  Hepatomegaly and hepatic steatosis. 3.  Cholelithiasis. 4.  Right renal cortical scarring. No hydronephrosis. 5.  Small left pleural effusion. Mild bibasilar atelectasis. 6.  Subacute to chronic appearing T11 and T12 superior endplate compression fractures. These findings were discussed with SVETLANA PAREDES on 11/3/2020 4:49 PM.          Ct-drain-peritoneal    Result Date: 11/4/2020 11/4/2020 5:19 PM HISTORY/REASON FOR EXAM:  Joya Splenic Fluid Collection TECHNIQUE/EXAM DESCRIPTION: LUQ parasplenic abscess drainage with CT guidance. Low dose optimization technique was utilized for this CT exam including automated exposure control and adjustment of the mA and/or kV according to patient size. PROCEDURE: Informed consent was obtained. SEDATION: Moderate sedation was provided. Pulse oximetry and continuous cardiac monitoring by the nurse was performed throughout the exam. Intraservice time was 30 minutes. Localizing CT images were obtained with the patient in supine position. The skin was prepped with Betadine and draped in a sterile fashion. Following local anesthesia with 1% Lidocaine, a 17 G guiding needle was placed and needle path confirmed with CT. An Amplatz guidewire was placed and following serial tract dilatation, a 10 Tamazight pigtail locking catheter was placed. A specimen was collected and submitted for culture and sensitivity and Gram stain. Total of 6 mL of Brownish fluid was drained. The catheter was secured to the skin and connected to suction bulb drainage. Final CT images were obtained documenting catheter position. The patient tolerated the procedure well with no evidence of complication. COMPARISON: CT scan abdomen and pelvis 11/3/2020 FINDINGS: The final CT images show satisfactory catheter position within the target collection.     1.  CT guided perisplenic abscess catheter drainage. 2.  The current plan is to obtain a follow-up CT in 5-7 days..    [id number] View    Result Date: 11/20/2020 11/20/2020 2:00 PM HISTORY/REASON FOR EXAM:  Abdominal Pain. TECHNIQUE/EXAM DESCRIPTION AND NUMBER OF VIEWS:  1 view(s) of the abdomen. COMPARISON: None FINDINGS: No evidence for small bowel obstruction. Surgical clips present in the mid abdomen. LEFT mid abdominal stoma noted. Surgical drain present in the LEFT lateral abdomen. Catheter  projects of the RIGHT midabdomen, possibly wound VAC. Degenerative change of lumbar spine.     1.  No evidence of bowel obstruction. 2.  Postoperative changes as described.    Dx-chest-portable (1 View)    Result Date: 11/17/2020 11/17/2020 9:19 AM HISTORY/REASON FOR EXAM: Elevated white blood cell count. TECHNIQUE/EXAM DESCRIPTION AND NUMBER OF VIEWS: Single portable view of the chest. COMPARISON: 11/10/2020 FINDINGS: There are bilateral interstitial and airspace infiltrates. There is small bilateral pleural effusion. The heart is enlarged. Right subclavian central line is again seen.     1.  Bilateral airspace and interstitial infiltrates. 2.  Cardiomegaly.    Dx-chest-portable (1 View)    Result Date: 11/10/2020  11/10/2020 12:29 PM HISTORY/REASON FOR EXAM: new central line. TECHNIQUE/EXAM DESCRIPTION AND NUMBER OF VIEWS: Single AP view of the chest. COMPARISON: November 3 FINDINGS: Hardware: ] Tip overlies the cavoatrial junction Lungs: Linear middle lobe and left basilar opacity is seen. Volumes are low Pleura:  No pneumothorax is identified Heart and mediastinum: There is stable cardiac silhouette enlargement.     No pneumothorax identified following right subclavian line placement Worsening atelectasis with consolidation at the bases not excluded Stable cardiac silhouette enlargement    Dx-chest-portable (1 View)    Result Date: 11/3/2020  11/3/2020 3:41 PM HISTORY/REASON FOR EXAM:  Chest Pain. TECHNIQUE/EXAM DESCRIPTION AND NUMBER OF VIEWS: Single portable view of the chest. COMPARISON: September 10, 2020 FINDINGS: The cardiac size is enlarged. There is interstitial prominence. Lung aeration has improved. No pneumothorax. No obvious pleural effusion.     Slight interval interval improvement in congestive heart failure.    Us-renal    Result Date: 11/11/2020 11/11/2020 6:21 PM HISTORY/REASON FOR EXAM:  Abnormal Labs TECHNIQUE/EXAM DESCRIPTION: Renal ultrasound. COMPARISON:  None FINDINGS: The right kidney  measures 9.39 cm. The left kidney measures 9.54 cm. There is no hydronephrosis. There are no abnormal calcifications. The bladder not fully the time of imaging.      Examination was limited due to poor sound penetration related to body habitus. Grossly normal appearance of the kidneys.    Ec-echocardiogram Complete W/ Cont    Result Date: 11/11/2020  Transthoracic Echo Report Echocardiography Laboratory CONCLUSIONS Normal left ventricular chamber size. Moderately reduced left ventricular systolic function. Left ventricular ejection fraction is visually estimated to be 30-40%; variable related to atrial fibrillation. Mild mitral regurgitation. Moderately dilated right ventricle. Reduced right ventricular systolic function. Right heart pressures are normal. No prior study is available for comparison. LV EF:        % FINDINGS Left Ventricle 3 mL of contrast was administered. Contrast was used to enhance visualization of the endocardial border. Existing IV was used. Mild concentric left ventricular hypertrophy. Normal left ventricular chamber size.  Moderately reduced left ventricular systolic function. Left ventricular ejection fraction is visually estimated to be 30-40%. Diastolic function is difficult to assess with atrial fibrillation. Right Ventricle Moderately dilated right ventricle. Reduced right ventricular systolic function. Right Atrium Enlarged right atrium. Normal inferior vena cava size and inspiratory collapse. Left Atrium Normal left atrial size. Mitral Valve Structurally normal mitral valve. Mild mitral regurgitation. No mitral stenosis. Aortic Valve Tricuspid aortic valve. Structurally normal aortic valve. No stenosis or regurgitation seen. Tricuspid Valve Structurally normal tricuspid valve. Mild tricuspid regurgitation. Right atrial pressure is estimated to be 3 mmHg. Estimated right ventricular systolic pressure  is 25 mmHg. No tricuspid stenosis. Right heart pressures are normal. Pulmonic Valve  Structurally normal pulmonic valve without significant stenosis or regurgitation. Pericardium Normal pericardium without effusion. Aorta The aortic root is normal.  Ascending aorta diameter is 3.6 cm. Germain Pacheco M.D. (Electronically Signed) Final Date:     11 November 2020                 12:38 Amended:        11 November 2020 12:40      Micro:  Results     Procedure Component Value Units Date/Time    URINALYSIS [[id number]]  (Abnormal) Collected: 11/17/20 2302    Order Status: Completed Specimen: Urine, Clean Catch Updated: 11/17/20 2350     Color Yellow     Character Cloudy     Specific Gravity 1.018     Ph 5.0     Glucose Negative mg/dL      Ketones Negative mg/dL      Protein 30 mg/dL      Bilirubin Negative     Urobilinogen, Urine 0.2     Nitrite Negative     Leukocyte Esterase Negative     Occult Blood Small     Micro Urine Req Microscopic    Narrative:      Collected By:[id number] ELDER SENA          Assessment:  Active Hospital Problems    Diagnosis   • *Spontaneous perforation of colon ([facility]) [K63.1]   • Leucocytosis [D72.829]   • Schizophrenia ([facility]) [F20.9]   • Sepsis ([facility]) [A41.9]   • Elevated digoxin level [R78.89]   • SRUTHI (acute kidney injury) ([facility]) [N17.9]   • Adrenal insufficiency ([facility]) [E27.40]   • LV dysfunction [I51.9]   • Paroxysmal A-fib with RVR ([facility]) [I48.0]   • Type 2 diabetes mellitus, without long-term current use of insulin, with peripheral neuropathy ([facility]) [E11.9]   • Hypothyroidism [E03.9]   • Prolonged QT interval [R94.31]     Hospital Course:   CT scan on 11/3 revealed a peripherally enhancing perisplenic fluid collection measuring 8.7 x 7.9 cm, communicating with the splenic flexure of the colon and contained foci of air consistent with abscess/infected fluid.  Also noted nonenhancing of the anterior aspect of the spleen suggesting infection/infarction.    Status post surgery on 11/10/2020    Leukocytosis -persistent. Increased     Acute kidney injury- improved   Renal  ultrasound-unremarkable  Nephrology following  Avoid nephrotoxins     Bacteremia  -Blood cultures on 11/31/2 + viridans streptococci, possible contaminant  -Blood culture on 11/5 with no growth to date     Perisplenic abscess with possible communication to the bowel  Left upper quadrant abscess     Recent ground-level fall located by a subcapsular splenic hematoma  Afebrile  Splenic enhancement on CT, suggesting infection or infarction  -s/p drainage 11/4 and peritoneal fluid Efaecalis, ampicillin sensitive and C albicans  S/p segmental colectomy, colostomy, irrigation and debridement of flank wound and wound VAC placement on 11/10/2020 by Dr. Desouza.  Patient found to have a left upper quadrant abscess inferior to the spleen with a large bowel wall defect.  Also noted to have an abdominal wall abscess.  Op cultures (colon perforation) - E faecalis and Candida albicans.  Gram stain+ GPC, GPR  -CT abdomen pelvis without on 11/17-hypodensity along anterior aspect of the spleen measuring 4.3 x 2.9 cm.  Parastomal density measuring two-point by 2.1 cm likely representing postsurgical hematoma.  This is decreased in size from prior.  Small amount of free fluid in the abdomen pelvis.  -Drain removed on 11/21     Continue vancomycin to treat the Enterococcus (PCN allergic), continue ceftriaxone to provide gram-negative coverage.  Flagyl stopped as he had ongoing nausea even with IV.   Antibiotic selection is limited due to his numerous allergies.  Continue fluconazole 400 mg daily  for the Candida  Repeat CT abdomen and pelvis with contrast today, nilda in view of increasing WBC -depending on results may be able to give final recommendations  Anticipate 10 day course from date of last surgery pending clinical improvement     Diabetes mellitus  Keep BS under 150 to help control current infection     A. Fib  Contributing to hypotension  Cards following        Antibiotic allergies: Daptomycin rash, penicillins rash and hives,  tolerates cephalosporins-discussed allergies and he states that he has developed significant rash and hives with penicillins several times, most recently a year and a half ago- will avoid penicillins

## 2020-11-24 NOTE — CARE PLAN
Problem: Pain Management  Goal: Pain level will decrease to patient's comfort goal  11/24/2020 1100 by GERARD OlveraNVane  Outcome: PROGRESSING AS EXPECTED  11/24/2020 1100 by Kirsty Howard R.N.  Outcome: PROGRESSING AS EXPECTED     Problem: Psychosocial Needs:  Goal: Level of anxiety will decrease  Outcome: PROGRESSING AS EXPECTED

## 2020-11-24 NOTE — PROGRESS NOTES
Daily Progress Note:     Date of Service: 11/23/2020  Primary Team: [facility] Yellow Team   Attending: JONATHAN Myrick M.D.   Senior Resident: Dr. Ortega  Intern: Dr. Ortega  Contact:  [phone]    Chief Complaint:   Abdominal pain,     Subjective  Pt noted today having feeling of warm and sweating. Otherwise, no worsening chest pain, abdominal pain. Denies chills and shakes.     Interval history  -continuing vancomycin, ceftriaxone and fluconazole.  -Stable creatinine  -increased resipiradone from 0.5mg qnightly to 0.5mg qAM and 1.0 qnightly.  -blood cx NGTD;   -ordered CT-abd and pelvis w/ and w/o contrast.          Consultants/Specialty:  ID  Gen surgery    Review of Systems:    Review of Systems   Constitutional: Negative for diaphoresis, fever and malaise/fatigue.   Respiratory: Negative for cough and shortness of breath.    Cardiovascular: Negative for chest pain, orthopnea, claudication and leg swelling.   Gastrointestinal: Negative for abdominal pain and vomiting.   Genitourinary: Negative for hematuria.   Musculoskeletal: Positive for back pain and joint pain.   Neurological: Positive for weakness. Negative for dizziness, tremors, sensory change, speech change and focal weakness.   Psychiatric/Behavioral: Negative for depression and hallucinations. The patient has insomnia.        Objective Data:   Physical Exam:   Vitals:   Temp:  [36 °C (96.8 °F)-36.9 °C (98.5 °F)] 36.5 °C (97.7 °F)  Pulse:  [74-92] 77  Resp:  [17-18] 18  BP: (118-156)/(64-78) 156/78  SpO2:  [97 %-100 %] 98 %   Physical Exam  Constitutional:       General: He is not in acute distress.     Appearance: Normal appearance. He is not ill-appearing or toxic-appearing.   HENT:      Mouth/Throat:      Mouth: Mucous membranes are moist.   Eyes:      General: No scleral icterus.  Cardiovascular:      Rate and Rhythm: Normal rate. Rhythm irregular.      Pulses: Normal pulses.      Heart sounds: Murmur present.   Pulmonary:      Effort: Pulmonary  effort is normal. No respiratory distress.      Breath sounds: Normal breath sounds.   Abdominal:      General: Abdomen is flat. Bowel sounds are normal. There is no distension.      Palpations: There is no mass.      Tenderness: There is abdominal tenderness. There is no right CVA tenderness or rebound.      Hernia: No hernia is present.      Comments: Wound vacc nicely placed at midline at the surgical site. Clean dry intact,    Musculoskeletal:      Right lower leg: No edema.      Left lower leg: No edema.   Skin:     General: Skin is warm.   Neurological:      General: No focal deficit present.      Mental Status: He is alert and oriented to person, place, and time.      Cranial Nerves: No cranial nerve deficit.   Psychiatric:         Mood and Affect: Mood normal.         Behavior: Behavior normal.         Thought Content: Thought content normal.         Judgment: Judgment normal.           Labs:   WBC 13.6 from 15.1  Hgb 9.4    Alb 2.5  Total protein 5.2    FT4 1.14  TSH 16     C.diff neg on 11/5    Wound cx colon perforation 11/10 E.faecalis and C. albicans  BETTE drain on 11/4 growing E.faeclis and C. Albicans    UA on 11/17  Protein 30  Uric acid positive      EKG on 11/2020  Normal rate Afib of 97  No LAFB and IVCD (no BBB) per my read  qtc 473        Imaging:   CT abd w/ con on 11/17    Moderate left and small right pleural effusion with atelctasis  Joya-spleenic abscess 2.9 x4.3 decreased compared to prior imaging.   Cortical scarring of right kidney;     Postsurgical changes at left abd stoma with post-surgical hematoma of 2.8 x2.1 cm    Cholelithiasis    Kidney US on 11/11  Normal, no hydro uretero nephrosis.     Problem Representation  Mr. Castro is a 56 y/o M w/ hx of schizophrenia with positive symptom of AVH initially on home sertaline, olanzapine, T2DM hgbA1c 7.8 on 11/10/2020, HFrEF of 30% on 11/2020  from ischemic and hypertensive cardiomyopathy on spirnolactone, furosemide and metoprolol  initially was seen by ED for perispleenic hematoma was discharged and was admitted for persistent left abdominal pain found to have vj-spleenic abscess with bowel perforation s/p left spelinic flexure colectomy on colostomy bag with albicans candidemia and E. Faecalis sepsis. Pt  developed afib with RVR s/p surgery and was started on digoxin but was supratherapeutic likely d/t hemodynamic and septic SRUTHI; digoxin level improved, afib now well rate controlled on metoprolol, Pt also developed prolonged QT  Secondary to polypharmacy from SSRI, linezolid and anti-emetics.    Pt drain currently removed. Pending CT-ABD and Pelvis w/ and w/o contrast to check if has good source control of abscess. Pt currently now on Vancomycin for E.facalis, Ceftriaxone, and fluconazole. Latest  bld cx NGTD since 11/5; with leukocytosis downtrending.     QTc prolongation 2/2 polypharmacy now resolved within normal range after adjustment. Currently now on ziprasidone 0.5mg Q am and 1.0 Q evening.       #sepsis  #perisplenic Albicans and E. Faeclis abscess w/ adjust splenic flexure perforation  #s/p segmental colectomy on colostomy bag , Patti pouch,  #s/p I & D of abdominal wall abscess s/p drain pulled out that was draining abscess  #hypoalbuminemia/ caloric protein malnutrition    Latest abscess size smaller than previous 2.8 x 2.1 cm, on ceftriaxone, vancomycin and fluconazole. Pending CT-abd and pelvis with contrast tomorrow for comparison of abscess size. Was on flagyl and was discontinued due to N/V; pt was started on clindamycin but was d/c by ID       Plan  -continue to monitor WBC ct   -pending CT-abd and pelvis imaging for abscess re-evaluation and revaluation of source control success.     - continue Fluconazole(11/14)  - continue vancomycin (11/17  - continue ceftriaxone  11/17    -continue enteral nutrition.   -consult nutrition to improve wound healing and recovery to sepsis       #S/p surgical wound care for colostomy,  segmental left splenic flexure colectomy.  #protein caloric malnutrition/hypoalbuminemia  #T2DM hgbA1c 7.8 on 11/2020 w/o CKD, but with small nerve and large nerve neuropathy---   #postural orthostatic hypotension likely 2/2 small nerve T2DM neuropathy vs physical decompensation     Plan  -continue to monitor Phos, Mg  Level for refeeding syndrome.   -Continue PO enteral feeds  -continue basal lantus 10u qdaily  -continue ISS w/ hypoglycemia protocol w/ target of 140-180 glucose   -holding off metformin  -consulting nutrition if needed      #Schizophrenia (HCC) with positive sign  #SI w/o plan --unresolved.  #prolong QTc 2/2 polypharmacy    Stopped olanzapine, sertaline, started on modafinil and now on risperidone    Plan  -Increased risperidone dose from 0.5mg to 1.5mg total daily.    -continue to monitor QTc  -continue to monitor CBC          #HFrEF of 30% NYHA III-IV, Stage C  #ischemic and hypertensive cardiomyopahty   #b/l pleural effusion 2/2 surgical volume maintenance.   Stable.     Plan  -continue  metoprolol 75 mg BID  -continue lisinopril 20 mg qdaily,     #Paroxysmal A-fib with RVR (HCC)- (present on admission)  #supratherapuetic digoxin -- resolved.     Stable. Rate is <110 at rest  Not on ACC 2/2 fall 2/2 postural orthostatic intolerance and hypotension   CHADSVASC of 3; HASBLED 2 but pt is fall risk.       Plan  -continue to monitor Mg >2, Phos >3 and K >4, replete prn  - continue metop to 75 mg BD  - continue amiodarone 200 mg daily      #Subclinical Hypothyroidism  #Hypoalbuminemia/protein-caloric malnutrition  #Sepsis   #paroxysmal Afib w/ RVR   #normocytic anemia --likely from Anemia of inflammation, chronic disease.     Stable . Cause of hypothyroidism is multifactorial but willl treat  TSH 16.0 (prior was 8.0) and T4 1.14    Plan  - continue on Levothyroxine 112mcg daily.  -continue iron supplement  -continue metoclopramide and prochlorpazine for nausea/vomiting      #DJD  Stable    Plan  -continue PRN noroc 10/325 q6hrs prn       #hemodynamic SRUTHI (acute kidney injury) (HCC)  Assessment & Plan  Resolved    T/L/D  1x PIV  1 surgical wound vacc at abd   On Famotiidne for GI prophjylaxis   Enoxparin 60mg SQ BID  Diabetic diet

## 2020-11-24 NOTE — PROGRESS NOTES
Pharmacy Kinetics 55 y.o. male on vancomycin day # 8  11/24/2020    Currently Dose: Vancomycin 1750 mg iv q24hr (~10 mg/kg/dose)  Received Load Dose: Yes     Indication for Treatment: IAI/abscess  Provider Specified End Date: None  ID Service Following: Yes     Pertinent history per medical record: Admitted on 11/3/2020 for concern of abdominal pain after surgical intervention. Concern for subsequent colonic perforation and possible abscess. ID and surgery consulted.     Other antibiotics: ceftriaxone 2 gm iv q24h, fluconazole 400 mg po q24h     Allergies: Daptomycin, Pcn [penicillins], Unasyn [ampicillin-sulbactam sodium], and Vancomycin      List concerns for accumulation of vancomycin: BMI ~57, BUN:SCr > 20:1     Pertinent cultures to date:   Results     Procedure Component Value Units Date/Time    URINALYSIS [[id number]]  (Abnormal) Collected: 11/17/20 2302    Order Status: Completed Specimen: Urine, Clean Catch Updated: 11/17/20 2350     Color Yellow     Character Cloudy     Specific Gravity 1.018     Ph 5.0     Glucose Negative mg/dL      Ketones Negative mg/dL      Protein 30 mg/dL      Bilirubin Negative     Urobilinogen, Urine 0.2     Nitrite Negative     Leukocyte Esterase Negative     Occult Blood Small     Micro Urine Req Microscopic    Narrative:      Collected By:[id number] ELDER SENA        MRSA nares swab if pneumonia is a concern (ordered/positive/negative/n-a): n/a    Recent Labs     11/22/20 0300 11/23/20 0440 11/24/20  0530   WBC 15.1* 13.6* 16.8*   NEUTSPOLYS 67.30 63.50 74.70*     Recent Labs     11/22/20  0300 11/23/20  0440 11/24/20  0530   BUN 13 16 18   CREATININE 0.61 0.70 0.75   ALBUMIN 2.6* 2.5* 2.3*     Recent Labs     11/22/20  0300 11/24/20  0530   VANCOTROUGH 14.0 15.1     Intake/Output Summary (Last 24 hours) at 11/24/2020 0740  Last data filed at 11/23/2020 1700  Gross per 24 hour   Intake 360 ml   Output 800 ml   Net -440 ml      /84   Pulse (!) 112   Temp 37 °C (98.6 °F)  (Temporal)   Resp 18   Ht 1.829 m (6')   Wt (!) 191 kg (421 lb 1.3 oz)   SpO2 95%  Temp (24hrs), Av.5 °C (97.7 °F), Min:36 °C (96.8 °F), Max:37 °C (98.6 °F)    Estimated Creatinine Clearance: 189.1 mL/min (by C-G formula based on SCr of 0.75 mg/dL).    A/P   1. Vancomycin dose change: not indicated   2. Next vancomycin level: ~2-3 days (not ordered)  3. Goal trough: 10-15 mcg/mL  4. Comments: Tachycardic. Afebrile. WBC increased. SCr with slight increase. CrCl likely inaccurate due to BMI ~57. No new microbiology. Most recent vancomycin level at upper end of goal trough range. Likely to repeat vancomycin level in ~2-3 days pending clinical course. Chart review notes possible intervention planned. Pharmacy will continue to follow.    Eric Peacock, PharmD

## 2020-11-24 NOTE — THERAPY
"Occupational Therapy  Daily Treatment     Patient Name: Sebastián Castro  Age:  55 y.o., Sex:  male  Medical Record #: [id number]  Today's Date: 11/24/2020     Precautions  Precautions: Fall Risk  Comments: wound vac, colostomy    Assessment    Remains limited by weakness, fatigue, impaired balance which impacts independence in self care and functional mobility.    Plan    Continue current treatment plan.    DC Equipment Recommendations: Unable to determine at this time  Discharge Recommendations: Recommend post-acute placement for additional occupational therapy services prior to discharge home    Subjective    \"I'll always try\"     Objective       11/24/20 1340   Cognition    Cognition / Consciousness WDL   Level of Consciousness Alert   Comments pleasant and cooperative   Balance   Sitting Balance (Static) Fair -   Sitting Balance (Dynamic) Poor +   Standing Balance (Static) Trace +   Standing Balance (Dynamic) Trace   Weight Shift Sitting Poor   Weight Shift Standing Poor   Bed Mobility    Supine to Sit Maximal Assist   Sit to Supine Maximal Assist   Scooting Moderate Assist   Activities of Daily Living   Grooming Supervision;Seated  (wash hair, wash face)   Lower Body Dressing Total Assist  (L sock)   Functional Mobility   Sit to Stand Maximal Assist   Bed, Chair, Wheelchair Transfer Unable to Participate   Mobility EOB>partial STS x2>BTB   Comments w/ fww   Short Term Goals   Short Term Goal # 1 Pt will sit EOB & groom wtih supervision   Goal Outcome # 1 Goal met   Short Term Goal # 2 Pt will dress LB with AE & Min A   Goal Outcome # 2 Goal not met   Short Term Goal # 3 Pt will be Mod A with BSC transfers   Goal Outcome # 3 Goal not met         "

## 2020-11-24 NOTE — PROGRESS NOTES
Pt seen  CT reviewed  Clinically benign exam  Significance of CT findings unclear  Giving pts body habitus and recent surgical history surgical drainage is not a viable option  Recommend IR drainage with or without embolization of the spleen as needed.

## 2020-11-24 NOTE — PROGRESS NOTES
Received report from day staff. Assumed pt care. AOX4. POC discussed. Tele monitor in place. Call light within reach, bed in lowest position, and personal items accessible.

## 2020-11-24 NOTE — THERAPY
"Physical Therapy   Daily Treatment     Patient Name: Sebastián Castro  Age:  55 y.o., Sex:  male  Medical Record #: [id number]  Today's Date: 11/24/2020     Precautions: Fall Risk    Assessment  Pt motivated to participate in therapy. Pt tolerated sitting EOB ~15 minutes. Initially required incr assist to find midline and shift weight anteriorly. Then able to hold self. Pt performed LAQs at EOB and seated ADLs with OT. Attempted STS x2 to xavier FWW, pt unable to achieve full buttocks clearance despite max A and ultimately returned supine. Pt reports incr fatigue today as he had a busy morning. Continue to follow, recommend placement.     Plan  Continue current treatment plan.  DC Equipment Recommendations: Unable to determine at this time  Discharge Recommendations: Recommend post-acute placement for additional physical therapy services prior to discharge home        Subjective  \"I'm always willing to try.\"       Objective     11/24/20 1331   Sitting Lower Body Exercises   Long Arc Quad 2 sets of 15   Home Exercise Program   Comments pt reports has been performing HEP in bed daily, no questions   Balance   Sitting Balance (Static) Fair -   Sitting Balance (Dynamic) Poor +   Standing Balance (Static) Trace +   Standing Balance (Dynamic) Trace   Weight Shift Sitting Poor   Weight Shift Standing Poor   Comments VCs for anterior weight shift. once positioned, able to hold midline. attempted stand with xavier FWW but poor buttocks clearance   Gait Analysis   Gait Level Of Assist Unable to Participate   Comments unable to achieve full upright stand this session. also reports incr fatigue from busy morning   Bed Mobility    Supine to Sit Maximal Assist   Sit to Supine Maximal Assist   Scooting Moderate Assist   Rolling Maximal Assist to Rt.   Comments cues for initiation and sequencing, assist at LEs and trunk   Functional Mobility   Sit to Stand Maximal Assist   Bed, Chair, Wheelchair Transfer Unable to Participate   Comments " partial stand with xavier FWW. attempted x2, unable to achieve full buttocks clearance. sliding on air mattress and unsafe to trial again.    Short Term Goals    Short Term Goal # 1 Pt to move supine to/from eob, no rails, w/ spv in 6 visits to improve fxl indep   Goal Outcome # 1 goal not met   Short Term Goal # 2 Pt to move sit to/from stand w/ spv in 6 visits w/ spv in 6 visits to improve fxl indep   Goal Outcome # 2 Goal not met   Short Term Goal # 3 Pt to ambulate 150 ft w/ spv in 6 visits to improve fxl indep   Goal Outcome # 3 Goal not met

## 2020-11-24 NOTE — PROGRESS NOTES
Daily Progress Note:     Date of Service: 11/24/2020  Primary Team: [facility] IM Yellow Team   Attending: JONATHAN Myrick M.D.   Senior Resident: Dr. Ortega  Intern: Dr. Ortega  Contact:  [phone]    Chief Complaint:   Abdominal pain    Subjective  No new symptoms. Feels better, Has itching at the abdomen where the wound vac is placed. Denies chills, shakes, chills and dyspnea.    Interval history:  -Leukocytosis trended up 16.8 from 13.6; abs neutrophil of 12.52 from 8.63  Immautre granulocyte was 1.30 from 1.80 though   -on vancomycin, ceftriaxone and fluconazole still on  -tolerating total dose of resipiradone of 1.5mg daily  -reports eating 50% meals with good boost plus TID and Greek Yogu  -CT-abd and pelvis with contrast for per-splenic abscess evaluation showed increasing in size of perisplenic abscess    -Pt accepted to [facility] but may need LTACH depending on circumstances       Update:  Contacted Dr. Hodges, likely pt will not benefit I&D and would benefit with spleenectomy which is high risk for this patient given age and other comorbidities, so will be talking to IR for arterial embolization and would safetly remove spleen out or will do percutaneous drain of the fluid collection.    Consultants/Specialty:  Gen surgery  ID  Interventional radiology    Review of Systems:  Review of Systems   Constitutional: Negative for chills, fever and malaise/fatigue.   HENT: Negative for sore throat.    Respiratory: Negative for cough, hemoptysis, sputum production and shortness of breath.    Cardiovascular: Negative for chest pain, palpitations, claudication and leg swelling.   Gastrointestinal: Negative for abdominal pain, nausea and vomiting.   Genitourinary: Negative for dysuria.   Musculoskeletal: Negative for myalgias.   Neurological: Positive for weakness. Negative for dizziness, focal weakness and seizures.   Psychiatric/Behavioral: Negative for memory loss. The patient is not nervous/anxious and does not  have insomnia.        Objective Data:   Physical Exam:   Vitals:   Temp:  [36 °C (96.8 °F)-37 °C (98.6 °F)] 37 °C (98.6 °F)  Pulse:  [] 112  Resp:  [17-20] 18  BP: (120-156)/() 148/84  SpO2:  [95 %-98 %] 95 %     Physical Exam  Constitutional:       General: He is not in acute distress.     Appearance: Normal appearance. He is not ill-appearing or toxic-appearing.   HENT:      Head: Normocephalic.   Eyes:      General: No scleral icterus.  Cardiovascular:      Rate and Rhythm: Normal rate and regular rhythm.      Pulses: Normal pulses.      Heart sounds: Murmur present.   Pulmonary:      Effort: Pulmonary effort is normal. No respiratory distress.      Breath sounds: Normal breath sounds.   Abdominal:      General: Abdomen is flat. Bowel sounds are normal.      Palpations: Abdomen is soft.      Comments: Midline vac clean dry and intact. No overt erythema, mild to non tenderness   Musculoskeletal:         General: No swelling or deformity.   Skin:     General: Skin is warm.   Neurological:      General: No focal deficit present.      Mental Status: He is alert and oriented to person, place, and time.      Cranial Nerves: No cranial nerve deficit.   Psychiatric:         Mood and Affect: Mood normal.         Behavior: Behavior normal.         Thought Content: Thought content normal.         Judgment: Judgment normal.           Labs:   WBC 16.8 from 13.6 w/ increased abs neutrophil of 12.52 from 8.63  Alb 2.3 and TP 5.3    HCO3 35    Imaging:   Repeat CT abdomen with contrast showing increased size vj-spleenic abscess    Assessment:    Mr. Castro is a 56 y/o M w/ hx of schizophrenia with positive symptom of AVH initially on home sertaline, olanzapine, T2DM hgbA1c 7.8 on 11/10/2020, HFrEF of 30% on 11/2020  from ischemic and hypertensive cardiomyopathy on spirnolactone, furosemide and metoprolol initially was seen by ED for perispleenic hematoma was discharged and was admitted for persistent left  abdominal pain found to have vj-spleenic abscess with bowel perforation s/p left spelinic flexure colectomy on colostomy bag with albicans candidemia and E. Faecalis sepsis. Pt  developed afib with RVR s/p surgery and was started on digoxin but was supratherapeutic likely d/t hemodynamic and septic SRUTHI; digoxin level improved, afib now well rate controlled on metoprolol, Pt also developed prolonged QT  Secondary to polypharmacy from SSRI, linezolid and anti-emetics.     New CT-abd w/ contrast showing increasing size vj-splenic abscess w/ concomittant leukocytosis worsening. Currently infection is well controlled on abx as pt is not having symptoms. Awaiting for gen surgery formal recommendation, will put pt on NPO and off from ACC    Update: Gen Surgery decided to do IR drainage of fluid/abscess.      #sepsis  #perisplenic Albicans and E. Faeclis abscess w/ adjust splenic flexure perforation  #s/p segmental colectomy on colostomy bag , Patti pouch,  #s/p I & D of abdominal wall abscess s/p drain pulled out that was draining abscess  #hypoalbuminemia/ caloric protein malnutrition     Latest abscess size bigger at 5.8 x 4.6 cm than previous 2.8 x 2.1 cm, on ceftriaxone, vancomycin and fluconazole. Likely per gen surgery not good candidate for I&D since infection likely at the spleen, advised would do spleenectomy  but high risk and would talk to IR for possible arterial embolization prior to splenectomy vs will do percutaneous drain s/p embolization.      Plan  -continue to monitor WBC ct     - continue Fluconazole(11/14)  - continue vancomycin (11/17  - continue ceftriaxone  11/17     -appreciate gen surgery recs  -would await for IR if needing orders.   -stopping ACC and will put pt NPO at midnight    -continue enteral nutrition.   -consult nutrition to improve wound healing and recovery to sepsis         #S/p surgical wound care for colostomy, segmental left splenic flexure colectomy.  #protein caloric  malnutrition/hypoalbuminemia  #T2DM hgbA1c 7.8 on 11/2020 w/o CKD, but with small nerve and large nerve neuropathy---   #postural orthostatic hypotension likely 2/2 small nerve T2DM neuropathy vs physical decompensation      Plan  -continue to monitor Phos, Mg  Level for refeeding syndrome.   -Continue PO enteral feeds  -continue basal lantus 10u qdaily  -continue ISS w/ hypoglycemia protocol w/ target of 140-180 glucose   -holding off metformin  -consulting nutrition if needed        #Schizophrenia (HCC) with positive sign  #SI w/o plan --unresolved.  #prolong QTc 2/2 polypharmacy     Stable.     Plan  -continue risperidone dose 0.5mg qAM and 1.0 mg qnightly      -continue to monitor QTc  -continue to monitor CBC          #HFrEF of 30% NYHA III-IV, Stage C  #ischemic and hypertensive cardiomyopahty   #b/l pleural effusion 2/2 surgical volume maintenance.   Stable.      Plan  -continue  metoprolol 75 mg BID  -continue lisinopril 20 mg qdaily,      #Paroxysmal A-fib with RVR ([facility])- (present on admission)  #supratherapuetic digoxin -- resolved.      Stable. Rate is <110 at rest  Not on ACC 2/2 fall 2/2 postural orthostatic intolerance and hypotension   CHADSVASC of 3; HASBLED 2 but pt is fall risk.         Plan  -continue to monitor Mg >2, Phos >3 and K >4, replete prn  - continue metop to 75 mg BD  - continue amiodarone 200 mg daily        #Subclinical Hypothyroidism  #Hypoalbuminemia/protein-caloric malnutrition  #Sepsis   #paroxysmal Afib w/ RVR   #normocytic anemia --likely from Anemia of inflammation, chronic disease.      Stable . Cause of hypothyroidism is multifactorial but willl treat  TSH 16.0 (prior was 8.0) and T4 1.14     Plan  - continue on Levothyroxine 112mcg daily.  -continue iron supplement  -continue metoclopramide and prochlorpazine for nausea/vomiting      #DJD  Stable     Plan  -continue PRN noroc 10/325 q6hrs prn         #hemodynamic SRUTHI (acute kidney injury) ([facility])  Assessment &  Plan  Resolved     T/L/D  1x PIV  1 surgical wound vac at [facility]   On Famotiidne for GI prophjylaxis   DVT: SCDs  Diabetic diet   Dispo 1-3 days depending on abscess management and status

## 2020-11-25 ENCOUNTER — APPOINTMENT (OUTPATIENT)
Dept: RADIOLOGY | Facility: MEDICAL CENTER | Age: 55
DRG: 329 | End: 2020-11-25
Attending: STUDENT IN AN ORGANIZED HEALTH CARE EDUCATION/TRAINING PROGRAM
Payer: MEDICAID

## 2020-11-25 LAB
ALBUMIN SERPL BCP-MCNC: 2.2 G/DL (ref 3.2–4.9)
ALBUMIN/GLOB SERPL: 0.7 G/DL
ALP SERPL-CCNC: 100 U/L (ref 30–99)
ALT SERPL-CCNC: 6 U/L (ref 2–50)
ANION GAP SERPL CALC-SCNC: 7 MMOL/L (ref 7–16)
ANISOCYTOSIS BLD QL SMEAR: ABNORMAL
AST SERPL-CCNC: 17 U/L (ref 12–45)
BASOPHILS # BLD AUTO: 0.3 % (ref 0–1.8)
BASOPHILS # BLD: 0.04 K/UL (ref 0–0.12)
BILIRUB SERPL-MCNC: 0.7 MG/DL (ref 0.1–1.5)
BUN SERPL-MCNC: 20 MG/DL (ref 8–22)
CALCIUM SERPL-MCNC: 8.5 MG/DL (ref 8.5–10.5)
CHLORIDE SERPL-SCNC: 95 MMOL/L (ref 96–112)
CO2 SERPL-SCNC: 32 MMOL/L (ref 20–33)
COMMENT 1642: NORMAL
CREAT SERPL-MCNC: 0.77 MG/DL (ref 0.5–1.4)
EOSINOPHIL # BLD AUTO: 0.32 K/UL (ref 0–0.51)
EOSINOPHIL NFR BLD: 2.4 % (ref 0–6.9)
ERYTHROCYTE [DISTWIDTH] IN BLOOD BY AUTOMATED COUNT: 57.2 FL (ref 35.9–50)
GLOBULIN SER CALC-MCNC: 3 G/DL (ref 1.9–3.5)
GLUCOSE BLD-MCNC: 116 MG/DL (ref 65–99)
GLUCOSE BLD-MCNC: 119 MG/DL (ref 65–99)
GLUCOSE BLD-MCNC: 127 MG/DL (ref 65–99)
GLUCOSE BLD-MCNC: 129 MG/DL (ref 65–99)
GLUCOSE BLD-MCNC: 134 MG/DL (ref 65–99)
GLUCOSE SERPL-MCNC: 113 MG/DL (ref 65–99)
HCT VFR BLD AUTO: 30 % (ref 42–52)
HGB BLD-MCNC: 8.7 G/DL (ref 14–18)
IMM GRANULOCYTES # BLD AUTO: 0.15 K/UL (ref 0–0.11)
IMM GRANULOCYTES NFR BLD AUTO: 1.1 % (ref 0–0.9)
INR BLD: 1.5
LYMPHOCYTES # BLD AUTO: 2.1 K/UL (ref 1–4.8)
LYMPHOCYTES NFR BLD: 16 % (ref 22–41)
MACROCYTES BLD QL SMEAR: ABNORMAL
MCH RBC QN AUTO: 25.2 PG (ref 27–33)
MCHC RBC AUTO-ENTMCNC: 29 G/DL (ref 33.7–35.3)
MCV RBC AUTO: 87 FL (ref 81.4–97.8)
MICROCYTES BLD QL SMEAR: ABNORMAL
MONOCYTES # BLD AUTO: 1.31 K/UL (ref 0–0.85)
MONOCYTES NFR BLD AUTO: 10 % (ref 0–13.4)
MORPHOLOGY BLD-IMP: NORMAL
NEUTROPHILS # BLD AUTO: 9.18 K/UL (ref 1.82–7.42)
NEUTROPHILS NFR BLD: 70.2 % (ref 44–72)
NRBC # BLD AUTO: 0 K/UL
NRBC BLD-RTO: 0 /100 WBC
OVALOCYTES BLD QL SMEAR: NORMAL
PLATELET # BLD AUTO: 207 K/UL (ref 164–446)
PLATELET BLD QL SMEAR: NORMAL
PMV BLD AUTO: 11 FL (ref 9–12.9)
POIKILOCYTOSIS BLD QL SMEAR: NORMAL
POLYCHROMASIA BLD QL SMEAR: NORMAL
POTASSIUM SERPL-SCNC: 4.3 MMOL/L (ref 3.6–5.5)
PROT SERPL-MCNC: 5.2 G/DL (ref 6–8.2)
RBC # BLD AUTO: 3.45 M/UL (ref 4.7–6.1)
RBC BLD AUTO: PRESENT
SODIUM SERPL-SCNC: 134 MMOL/L (ref 135–145)
TARGETS BLD QL SMEAR: NORMAL
WBC # BLD AUTO: 13.1 K/UL (ref 4.8–10.8)

## 2020-11-25 PROCEDURE — 80053 COMPREHEN METABOLIC PANEL: CPT

## 2020-11-25 PROCEDURE — 700105 HCHG RX REV CODE 258: Performed by: INTERNAL MEDICINE

## 2020-11-25 PROCEDURE — 700102 HCHG RX REV CODE 250 W/ 637 OVERRIDE(OP): Performed by: PSYCHIATRY & NEUROLOGY

## 2020-11-25 PROCEDURE — 302098 PASTE RING (FLAT): Performed by: HOSPITALIST

## 2020-11-25 PROCEDURE — 700102 HCHG RX REV CODE 250 W/ 637 OVERRIDE(OP): Performed by: INTERNAL MEDICINE

## 2020-11-25 PROCEDURE — A9270 NON-COVERED ITEM OR SERVICE: HCPCS | Performed by: PSYCHIATRY & NEUROLOGY

## 2020-11-25 PROCEDURE — A9270 NON-COVERED ITEM OR SERVICE: HCPCS | Performed by: STUDENT IN AN ORGANIZED HEALTH CARE EDUCATION/TRAINING PROGRAM

## 2020-11-25 PROCEDURE — 99153 MOD SED SAME PHYS/QHP EA: CPT

## 2020-11-25 PROCEDURE — 87077 CULTURE AEROBIC IDENTIFY: CPT

## 2020-11-25 PROCEDURE — 700102 HCHG RX REV CODE 250 W/ 637 OVERRIDE(OP): Performed by: STUDENT IN AN ORGANIZED HEALTH CARE EDUCATION/TRAINING PROGRAM

## 2020-11-25 PROCEDURE — 99233 SBSQ HOSP IP/OBS HIGH 50: CPT | Performed by: INTERNAL MEDICINE

## 2020-11-25 PROCEDURE — 700111 HCHG RX REV CODE 636 W/ 250 OVERRIDE (IP): Performed by: RADIOLOGY

## 2020-11-25 PROCEDURE — 079P30Z DRAINAGE OF SPLEEN WITH DRAINAGE DEVICE, PERCUTANEOUS APPROACH: ICD-10-PCS | Performed by: RADIOLOGY

## 2020-11-25 PROCEDURE — 85025 COMPLETE CBC W/AUTO DIFF WBC: CPT

## 2020-11-25 PROCEDURE — 770020 HCHG ROOM/CARE - TELE (206)

## 2020-11-25 PROCEDURE — 700111 HCHG RX REV CODE 636 W/ 250 OVERRIDE (IP): Performed by: INTERNAL MEDICINE

## 2020-11-25 PROCEDURE — 700111 HCHG RX REV CODE 636 W/ 250 OVERRIDE (IP)

## 2020-11-25 PROCEDURE — 99232 SBSQ HOSP IP/OBS MODERATE 35: CPT | Mod: GC | Performed by: HOSPITALIST

## 2020-11-25 PROCEDURE — 82962 GLUCOSE BLOOD TEST: CPT | Mod: 91

## 2020-11-25 PROCEDURE — A9270 NON-COVERED ITEM OR SERVICE: HCPCS | Performed by: INTERNAL MEDICINE

## 2020-11-25 PROCEDURE — 87205 SMEAR GRAM STAIN: CPT

## 2020-11-25 PROCEDURE — 87186 SC STD MICRODIL/AGAR DIL: CPT

## 2020-11-25 PROCEDURE — 87070 CULTURE OTHR SPECIMN AEROBIC: CPT

## 2020-11-25 PROCEDURE — 97606 NEG PRS WND THER DME>50 SQCM: CPT

## 2020-11-25 PROCEDURE — 700105 HCHG RX REV CODE 258: Performed by: HOSPITALIST

## 2020-11-25 PROCEDURE — 700111 HCHG RX REV CODE 636 W/ 250 OVERRIDE (IP): Performed by: HOSPITALIST

## 2020-11-25 PROCEDURE — 85610 PROTHROMBIN TIME: CPT

## 2020-11-25 RX ORDER — MIDAZOLAM HYDROCHLORIDE 1 MG/ML
.5-2 INJECTION INTRAMUSCULAR; INTRAVENOUS PRN
Status: ACTIVE | OUTPATIENT
Start: 2020-11-25 | End: 2020-11-25

## 2020-11-25 RX ORDER — SODIUM CHLORIDE 9 MG/ML
500 INJECTION, SOLUTION INTRAVENOUS
Status: ACTIVE | OUTPATIENT
Start: 2020-11-25 | End: 2020-11-25

## 2020-11-25 RX ORDER — NALOXONE HYDROCHLORIDE 0.4 MG/ML
INJECTION, SOLUTION INTRAMUSCULAR; INTRAVENOUS; SUBCUTANEOUS
Status: COMPLETED
Start: 2020-11-25 | End: 2020-11-25

## 2020-11-25 RX ORDER — ONDANSETRON 2 MG/ML
4 INJECTION INTRAMUSCULAR; INTRAVENOUS PRN
Status: ACTIVE | OUTPATIENT
Start: 2020-11-25 | End: 2020-11-25

## 2020-11-25 RX ORDER — MIDAZOLAM HYDROCHLORIDE 1 MG/ML
INJECTION INTRAMUSCULAR; INTRAVENOUS
Status: COMPLETED
Start: 2020-11-25 | End: 2020-11-25

## 2020-11-25 RX ADMIN — FENTANYL CITRATE 50 MCG: 50 INJECTION, SOLUTION INTRAMUSCULAR; INTRAVENOUS at 16:32

## 2020-11-25 RX ADMIN — MODAFINIL 100 MG: 100 TABLET ORAL at 04:55

## 2020-11-25 RX ADMIN — HYDROCODONE BITARTRATE AND ACETAMINOPHEN 1 TABLET: 10; 325 TABLET ORAL at 08:36

## 2020-11-25 RX ADMIN — RISPERIDONE 0.5 MG: 1 TABLET ORAL at 04:55

## 2020-11-25 RX ADMIN — LISINOPRIL 20 MG: 20 TABLET ORAL at 04:54

## 2020-11-25 RX ADMIN — CEFTRIAXONE SODIUM 2 G: 2 INJECTION, POWDER, FOR SOLUTION INTRAMUSCULAR; INTRAVENOUS at 04:55

## 2020-11-25 RX ADMIN — METOPROLOL SUCCINATE 75 MG: 50 TABLET, EXTENDED RELEASE ORAL at 21:22

## 2020-11-25 RX ADMIN — Medication 400 MG: at 04:55

## 2020-11-25 RX ADMIN — LEVOTHYROXINE SODIUM 112 MCG: 112 TABLET ORAL at 04:55

## 2020-11-25 RX ADMIN — FAMOTIDINE 20 MG: 20 TABLET, FILM COATED ORAL at 04:55

## 2020-11-25 RX ADMIN — Medication 400 MG: at 21:21

## 2020-11-25 RX ADMIN — METOPROLOL SUCCINATE 75 MG: 50 TABLET, EXTENDED RELEASE ORAL at 04:55

## 2020-11-25 RX ADMIN — FENTANYL CITRATE 25 MCG: 50 INJECTION, SOLUTION INTRAMUSCULAR; INTRAVENOUS at 16:45

## 2020-11-25 RX ADMIN — RISPERIDONE 1 MG: 1 TABLET ORAL at 21:21

## 2020-11-25 RX ADMIN — VANCOMYCIN HYDROCHLORIDE 1750 MG: 500 INJECTION, POWDER, LYOPHILIZED, FOR SOLUTION INTRAVENOUS at 04:55

## 2020-11-25 RX ADMIN — MIDAZOLAM HYDROCHLORIDE 1 MG: 1 INJECTION, SOLUTION INTRAMUSCULAR; INTRAVENOUS at 16:45

## 2020-11-25 RX ADMIN — AMIODARONE HYDROCHLORIDE 200 MG: 200 TABLET ORAL at 04:55

## 2020-11-25 RX ADMIN — SPIRONOLACTONE 25 MG: 50 TABLET ORAL at 04:55

## 2020-11-25 RX ADMIN — TRAZODONE HYDROCHLORIDE 50 MG: 50 TABLET ORAL at 21:22

## 2020-11-25 RX ADMIN — MIDAZOLAM HYDROCHLORIDE 2 MG: 1 INJECTION, SOLUTION INTRAMUSCULAR; INTRAVENOUS at 16:32

## 2020-11-25 RX ADMIN — CETIRIZINE HYDROCHLORIDE 10 MG: 10 TABLET, FILM COATED ORAL at 04:55

## 2020-11-25 RX ADMIN — FLUCONAZOLE 400 MG: 200 TABLET ORAL at 04:55

## 2020-11-25 ASSESSMENT — FIBROSIS 4 INDEX: FIB4 SCORE: 1.91

## 2020-11-25 ASSESSMENT — ENCOUNTER SYMPTOMS: FEVER: 0

## 2020-11-25 NOTE — PROGRESS NOTES
Daily Progress Note:     Date of Service: 11/25/2020  Primary Team: [facility] Yellow Team   Attending: JONATHAN Myrick M.D.   Senior Resident: Dr. Ortega  Intern: Dr. Ortega  Contact:  [phone]    Chief Complaint:   Abdominal pain    Subjective  Patient has chills last night. He is also endorsing room spinning. Otherwise, no fevers, and malaise. No dyspnea. No audiovisual hallucinations.    Interval history:  Leukocytosis trended down today to 13.1 from 16.8    -IR ordered PT/PTT this morning.  -STAT PT/PTT ordered this afternoon.   -Pending coagulation panel for IR drainage procedure  -Pending IR drainage today.          Consultants/Specialty:  ID  IR  Gen surgery    Review of Systems:   Review of Systems   Constitutional:  Has chills   fever and malaise/fatigue.   HENT: Negative for sore throat.    Respiratory: Negative for cough, hemoptysis, sputum production and shortness of breath.    Cardiovascular: Negative for chest pain, palpitations, claudication and leg swelling.   Gastrointestinal: Negative for abdominal pain, nausea and vomiting.   Genitourinary: Negative for dysuria.   Musculoskeletal: Negative for myalgias.   Neurological: Positive for weakness. Negative for dizziness, focal weakness and seizures.   Psychiatric/Behavioral: Negative for memory loss. The patient is not nervous/anxious and does not have insomnia.        Objective Data:   Physical Exam:   Vitals:   Temp:  [36.3 °C (97.3 °F)-37 °C (98.6 °F)] 36.3 °C (97.4 °F)  Pulse:  [] 116  Resp:  [16-20] 17  BP: (118-142)/(60-85) 134/75  SpO2:  [92 %-99 %] 92 %     Physical Exam  Constitutional:       General: He is in acute distress.      Appearance: Normal appearance. He is obese. He is not ill-appearing, toxic-appearing or diaphoretic.   HENT:      Head: Normocephalic.   Eyes:      General: No scleral icterus.     Pupils: Pupils are equal, round, and reactive to light.   Cardiovascular:      Rate and Rhythm: Normal rate and regular rhythm.       Pulses: Normal pulses.      Heart sounds: Murmur present.   Pulmonary:      Effort: Pulmonary effort is normal. No respiratory distress.      Breath sounds: Normal breath sounds.   Abdominal:      General: Abdomen is flat. Bowel sounds are normal.      Palpations: Abdomen is soft.   Musculoskeletal:      Right lower leg: No edema.      Left lower leg: No edema.   Neurological:      General: No focal deficit present.      Mental Status: He is alert and oriented to person, place, and time. Mental status is at baseline.      Cranial Nerves: No cranial nerve deficit.   Psychiatric:         Mood and Affect: Mood normal.         Behavior: Behavior normal.         Thought Content: Thought content normal.         Judgment: Judgment normal.           Labs:   WBC 13.1  Hgb 8.7  RDW 57.2    Na 134  K 4.3    Crea 0.77      Alb 2.2  TP 5.2    AST/ALT 17/6    Imaging:   No new imaging    Problem Representation:    Mr. Castro is a 54 y/o M w/ hx of schizophrenia with positive symptom of AVH initially on home sertaline, olanzapine, T2DM hgbA1c 7.8 on 11/10/2020, HFrEF of 30% on 11/2020  from ischemic and hypertensive cardiomyopathy on spirnolactone, furosemide and metoprolol initially was seen by ED for perispleenic hematoma was discharged and was admitted for persistent left abdominal pain found to have vj-spleenic abscess with bowel perforation s/p left spelinic flexure colectomy on colostomy bag with albicans candidemia and E. Faecalis sepsis. Pt  developed afib with RVR s/p surgery and was started on digoxin but was supratherapeutic likely d/t hemodynamic and septic SRUTHI; digoxin level improved, afib now well rate controlled on metoprolol, Pt also developed prolonged QT  Secondary to polypharmacy from SSRI, linezolid and anti-emetics.     New CT-abd w/ contrast showing increasing size vj-splenic abscess w/ concomittant leukocytosis worsening. Still pending for IR drainage of vj-splenic fluid/abscess today.         #sepsis  #perisplenic Albicans and E. Faeclis abscess w/ adjust splenic flexure perforation  #s/p segmental colectomy on colostomy bag , Patti pouch,  #s/p I & D of abdominal wall abscess s/p drain pulled out that was draining abscess  #hypoalbuminemia/ caloric protein malnutrition     Latest abscess size bigger at 5.8 x 4.6 cm than previous 2.8 x 2.1 cm, on ceftriaxone, vancomycin and fluconazole. Likely per gen surgery not good candidate for I&D since infection likely at the spleen, advised would do spleenectomy  but high risk and would talk to IR for possible arterial embolization prior to splenectomy vs will do percutaneous drain s/p embolization.      Plan  - pending IR vj-splenic abscess fluid drainage today.  - hold ACC and continue NPO at midnight    -continue to monitor WBC ct   - continue Fluconazole(11/14)  - continue vancomycin (11/17  - continue ceftriaxone  11/17     -appreciate gen surgery recs  -continue enteral nutrition.         #S/p surgical wound care for colostomy, segmental left splenic flexure colectomy.  #protein caloric malnutrition/hypoalbuminemia  #T2DM hgbA1c 7.8 on 11/2020 w/o CKD, but with small nerve and large nerve neuropathy---   #postural orthostatic hypotension likely 2/2 small nerve T2DM neuropathy vs physical decompensation      Plan  -continue to monitor Phos, Mg  Level for refeeding syndrome.   -Continue PO enteral feeds  -continue basal lantus 10u qdaily  -continue ISS w/ hypoglycemia protocol w/ target of 140-180 glucose   -holding off metformin  -consulting nutrition if needed        #Schizophrenia (HCC) with positive sign  #SI w/o plan --unresolved.  #prolong QTc 2/2 polypharmacy     Stable. No AVH overnight     Plan  -continue risperidone dose 0.5mg qAM and 1.0 mg qnightly      -continue to monitor QTc  -continue to monitor CBC          #HFrEF of 30% NYHA III-IV, Stage C  #ischemic and hypertensive cardiomyopahty   #b/l pleural effusion 2/2 surgical volume  maintenance.   Stable.      Plan  -continue  metoprolol 75 mg BID  -continue lisinopril 20 mg qdaily,      #Paroxysmal A-fib with RVR (HCC)- (present on admission)  #supratherapuetic digoxin -- resolved.      Stable. Rate is <110 at rest  Not on ACC 2/2 fall 2/2 postural orthostatic intolerance and hypotension   CHADSVASC of 3; HASBLED 2 but pt is fall risk.         Plan  -continue to monitor Mg >2, Phos >3 and K >4, replete prn  - continue metop to 75 mg BD  - continue amiodarone 200 mg daily        #Subclinical Hypothyroidism  #Hypoalbuminemia/protein-caloric malnutrition  #Sepsis   #paroxysmal Afib w/ RVR   #normocytic anemia --likely from Anemia of inflammation, chronic disease.      Stable . Cause of hypothyroidism is multifactorial but willl treat  TSH 16.0 (prior was 8.0) and T4 1.14     Plan  - continue on Levothyroxine 112mcg daily.  -continue iron supplement  -continue metoclopramide and prochlorpazine for nausea/vomiting      #DJD  Stable     Plan  -continue PRN noroc 10/325 q6hrs prn         #hemodynamic SRUTHI (acute kidney injury) (HCC)  Assessment & Plan  Resolved     T/L/D  1x PIV  1 surgical wound vac at [facility]   On Famotiidne for GI prophjylaxis   DVT: SCDs  Diabetic diet   Dispo 1-3 days depending on abscess management and status

## 2020-11-25 NOTE — WOUND TEAM
[facility]  Inpatient Services  Wound and Skin Care Evaluation    Admission Date: 11/3/2020     Last order of IP CONSULT TO WOUND CARE was found on 11/16/2020 from Hospital Encounter on 11/3/2020     HPI, PMH, SH: Reviewed    Unit where seen by Wound Team: [id number]     WOUND CONSULT/FOLLOW UP RELATED TO:   Scheduled abdominal NPWT dressing/Ostomy appliance change    Self Report / Pain Level:  Some tenderness, premedicated with po pain medication by nursing. Tolerated well.        OBJECTIVE:  Vac dressing intact. Other abdominal dressings intact.    WOUND TYPE, LOCATION, CHARACTERISTICS (Pressure Injuries: location, stage, POA or date identified)       Negative Pressure Wound Therapy 11/10/20 Surgical Abdomen Midline (Active)   Vacuum Serial Number [id number]    NPWT Pump Mode / Pressure Setting Ulta;125 mmHg    Dressing Type Medium;Black Foam (Regular)    Number of Foam Pieces Used 3    Canister Changed No    NEXT Dressing Change/Treatment Date 11/27/20            Wound 11/10/20 Full Thickness Wound Abdomen Midline wound vac (Active)   Wound Image   11/23/20 1016   Site Assessment Red;Granulation tissue    Periwound Assessment Pink;Fragile    Margins Attached edges    Closure Secondary intention    Drainage Amount Scant    Drainage Description Serosanguineous    Treatments Site care    Wound Cleansing Approved Wound Cleanser    Periwound Protectant Skin Protectant Wipes to Periwound;Drape;Mepitel;Hydrofiber;Stoma Powder    Dressing Cleansing/Solutions Not Applicable    Dressing Options Wound Vac    Dressing Changed Changed    Dressing Status Intact    Dressing Change/Treatment Frequency Monday, Wednesday, Friday, and As Needed    NEXT Dressing Change/Treatment Date 11/27/20    NEXT Weekly Photo (Inpatient Only) 11/30/20    Number of Staples Removed 10    Non-staged Wound Description Full thickness    Wound Length (cm) 13 cm (superior Wound)    Wound Width (cm) 6.5 cm    Wound Depth (cm) 3.5 cm    Wound  Surface Area (cm^2) 84.5 cm^2    Wound Volume (cm^3) 295.75 cm^3    Wound Healing % 61    Tunneling (cm) 0 cm    Undermining (cm) 0 cm    Shape oval    Wound Odor None    Exposed Structures Adipose           Inferior wound: 3 x 2.5 x 2         Lab Values:    Lab Results   Component Value Date/Time    WBC 13.1 (H) 11/25/2020 05:02 AM    RBC 3.45 (L) 11/25/2020 05:02 AM    HEMOGLOBIN 8.7 (L) 11/25/2020 05:02 AM    HEMATOCRIT 30.0 (L) 11/25/2020 05:02 AM    CREACTPROT 0.54 10/23/2018 08:02 AM    SEDRATEWES 13 10/23/2018 08:02 AM    HBA1C 7.8 (H) 11/10/2020 03:31 AM        Culture Results show:  Recent Results (from the past 720 hour(s))   CULTURE WOUND W/ GRAM STAIN    Collection Time: 11/10/20 10:39 AM    Specimen: Wound   Result Value Ref Range    Significant Indicator POS (POS)     Source WND     Site COLON PERFORATION     Culture Result - (A)     Gram Stain Result       Rare WBCs.  Rare Gram positive cocci.  Rare Gram positive rods.      Culture Result Enterococcus faecalis  Light growth   (A)     Culture Result Candida albicans  Light growth   (A)        Susceptibility    Enterococcus faecalis - ISAMAR     Daptomycin 4 Sensitive mcg/mL     Ampicillin <=2 Sensitive mcg/mL     Gent Synergy <=500 Sensitive mcg/mL     Vancomycin 1 Sensitive mcg/mL     Penicillin 8 Sensitive mcg/mL       INTERVENTIONS BY WOUND TEAM:   Vac dressing removed, no retained foam noted. Wound and vj wound cleansed with NS and gauze. Vj wound prepped with no sting barrier, the crusted using ostomy powder. Plain hydrofiber applied to to denuded parts and secured with mepitel and drape. Mepitel also applied to staples above superior wound and umbilicus. 1 pc of foam applied to superior wound bed. 1 pc of foam applied to inferior wound and bridged to superior portion. 3rd pc used as button for tracpad. Drape and tracpad to foam. NPWT resumed at 125 mm Hg continuous, no leaks noted. L flank wound dressing changed this morning. Site assessed,  "packing in place and dressing CDI. See ostomy note below.    Interdisciplinary consultation: Patient, Therese RN    EVALUATION / RATIONALE FOR TREATMENT: Periwound with denuded portions. Likely secondary to the drape. Pt will benefit from ongoing crusting or Hydrocolloid to prevent further breakdown. Nursing managing L flank dressing.     Goals: Steady decrease in wound area and depth weekly.    NURSING PLAN OF CARE ORDERS (X):    Dressing changes: See Dressing Care orders: X  Skin care: See Skin Care orders: X  RN Prevention Protocol: X  Rectal tube care: See Rectal Tube Care orders:   Other orders:      WOUND TEAM PLAN OF CARE:   Dressing changes by wound team:                   Follow up 3 times weekly:                NPWT change 3 times weekly:  X - abdomen   Follow up 1-2 times weekly:    X -left flank   Follow up Bi-Monthly:                   Follow up as needed:       Other (explain):     Anticipated discharge plans:  LTACH:        SNF/Rehab:     X            Home Health Care:           Outpatient Wound Center:            Self Care:                [facility]  Inpatient Services  New Ostomy Management & Teaching     HPI:  Reviewed  PMH: Reviewed   SH: Reviewed     Subjective: \" It didn't leak\"     Objective: Old drainage to inferior border of barrier, however, no signs of leaking under barrier.    Colostomy 11/10/20 Transverse LUQ (Active)   Wound Image       Stomal Appliance Assessment Intact    Stoma Assessment Red    Stoma Shape Budded Greater Than One Inch    Stoma Size (in) 2    Peristomal Assessment Pink    Mucocutaneous Junction Intact    Treatment Appliance Changed    Peristomal Protectant Paste Ring;No Sting Skin Prep    Stomal Appliance Paste Ring, 2\";2 3/4\" (70mm) CTF    Output (mL) 40 mL    Output Color Brown    WOUND RN ONLY - Stomal Appliance  2 Piece;Paste Ring, 2\";2 3/4\" (70mm) CTF;Transparent Pouch Lock & Roll    Appliance (Pouch) # [id number]    Appliance Brand Daisy    Appliance " "Supplier [employer]    Secure Start completed Yes                Ostomy Appliance (type and size):  2 piece 2 3/4\" with paste ring     Interventions:  Previous appliance removed, cleansed skin with warm wash cloth, dried. Made new template and Pt attempted to cut, but was unable to finish. Ostomy RN cut barrier to fit, trimming edge closest to abdominal wound. Peristoma crusted x1. Pt applied paste ring  to barrier ans ostomy RN applied barrier to skin. Used friction to adhered NM to skin. Pouch end opened and closed by pt. Ostomy RN connected pouch to barrier.                 Pt education: Pt was able to perform some hands-on care today. Will require ongoing assistance     Evaluation:  Due to body habitus and lack of mobility, it seems very unlikely that he will be able to change this appliance completely on his own. He may be able to observe more with a mirror but he will always need some assistance. At this point he is not mobilizing so he is not able to stand at a mirror and attempt to change appliance. Pt states he is burping his bag as needed which may account for the drainage noted to inferior border of barrier. However, nursing states they have been burping the bag for him.    Patient likely to discharge to SNF or Rehab. Stoma is viable and producing adequate amounts of stool.     Plan: Ostomy nurses to continue to follow for ostomy needs and teaching      Anticipated discharge needs: Supplies, supplier information, possible HH or outpatient ostomy clinic           "

## 2020-11-25 NOTE — PROGRESS NOTES
Infectious Disease Progress Note    Author: Cristy Holley M.D. Date & Time of service: 11/25/2020  12:43 PM    Chief Complaint:  Intrabdominal abscess and bacteremia     Interval History:   55 y.o. diabetic male admitted 11/3/2020.+ A. fib, CHD, and severe morbid obesity.  Seen by ID in the past for right foot abscess with cultures positive for MRSA and Proteus.  s/p GLF approximately 1 month ago and was admitted to [facility] with left upper quadrant pain.  CT at the time revealed a subcapsular splenic hematoma. Presented to ED complaining of abdominal pain.  11/9 AF WBC 9.4 Less pain in side but remains tender to touch. Denies SE Zyvox-counseled about sxs serotonin syndrome  11/11 T-max 99.7 WBC 15.4 patient underwent colectomy, colostomy placement and I&D of flank wound with wound VAC placement on 11/10/2020 by Dr. Desouza.  Postop course complicated by hypotension and transferred to the ICU.  On pressors.  Patient complaining of shortness of breath as well as lightheadedness.  Also has postop abdominal pain.  11/12 afebrile WBC 13.3.  Patient weaned off pressors overnight.  Operative cultures growing group D Enterococcus and yeast.  Patient feeling better today.  Improved shortness of breath.  Abdominal pain stable  11/13 afebrile WBC 13.8 patient continues to feel better.  He is inquiring when he can have food by mouth.  11/14 afebrile WBC 9.9.  Patient feeling a bit better today.  His diet has been slowly advanced.  He is however complaining of some sharp abdominal pain when he coughs  11/16  AF, O2 RA, abdominal pain and poor appetite.   11/17 AF, O2 1 L NC , WBC 19.1. Drain output 465 cc yesterday. Ongoing nausea, poor appetite and abdomen is quite tender on palpation.  Will broaden antibiotics.   11/18 AF, O2 1 L NC, WBC 18.1, Drain output- 610 cc. Patient still has abdominal pain and significant nausea.  Examined abdominal wound photos and discussed with wound care team.  He has a large wound  but clean appearing base with granulation tissue.  No obvious infection in the surrounding tissues.  Left side wound with packing in place, ongoing drainage but clearing .    AF, tachycardia and hypertensive , WBC 17.2, drain output-205 cc yesterday. C/o nausea and agree with plan to transition Flagyl to IV to see if this helps.     AF WBC 13.6 feels OK-abd pain controlled. Poor appetite and not eating much. Denies SE abx-nausea resolved   AF WBC 16 no new complaints-no new cough, dyspnea and abd pain the same. Appetite so-so.   AF WBC 13 CT reviewed H and H continues to decrease. Somnolent today-no acute events overnight     Review of Systems:  Review of Systems   Unable to perform ROS: Other   Constitutional: Negative for fever.       Hemodynamics:  Temp (24hrs), Av.5 °C (97.7 °F), Min:36.3 °C (97.3 °F), Max:37 °C (98.6 °F)  Temperature: 36.3 °C (97.4 °F)  Pulse  Av.9  Min: 51  Max: 161   Blood Pressure: 134/75      Physical Exam:  Physical Exam  Vitals signs and nursing note reviewed.   Constitutional:       General: He is not in acute distress.     Appearance: He is obese. He is not ill-appearing, toxic-appearing or diaphoretic.   HENT:      Nose: No congestion or rhinorrhea.   Eyes:      General:         Right eye: No discharge.         Left eye: No discharge.   Neck:      Musculoskeletal: No neck rigidity.   Cardiovascular:      Rate and Rhythm: Tachycardia present. Rhythm irregular.   Pulmonary:      Effort: Pulmonary effort is normal. No respiratory distress.      Breath sounds: No stridor. No wheezing.   Abdominal:      General: There is no distension.      Palpations: Abdomen is soft.      Tenderness: There is abdominal tenderness. There is no guarding.      Comments: Wound VAC in place, no sign infection in surrounding tissue  Ostomy     Musculoskeletal:      Right lower leg: Edema present.      Left lower leg: Edema present.   Lymphadenopathy:      Cervical: No cervical  adenopathy.   Skin:     General: Skin is warm.      Coloration: Skin is not jaundiced.      Findings: Bruising present.   Neurological:      Comments: somnolent         Meds:    Current Facility-Administered Medications:   •  diphenhydrAMINE  •  metoclopramide  •  risperiDONE  •  risperiDONE  •  magnesium oxide  •  modafinil  •  levothyroxine  •  HYDROcodone/acetaminophen  •  prochlorperazine  •  metoprolol SR  •  lisinopril  •  amiodarone  •  traZODone  •  vancomycin  •  Metoprolol Tartrate  •  Respiratory Therapy Consult  •  MD Alert...Vancomycin per Pharmacy  •  cefTRIAXone (ROCEPHIN) IV  •  spironolactone  •  cetirizine  •  famotidine  •  fluconazole  •  insulin glargine  •  insulin regular **AND** POC Blood Glucose **AND** NOTIFY MD and PharmD **AND** glucose **AND** dextrose 50%  •  albuterol    Labs:  Recent Labs     11/23/20 0440 11/24/20  0530 11/25/20  0502   WBC 13.6* 16.8* 13.1*   RBC 3.70* 3.81* 3.45*   HEMOGLOBIN 9.4* 9.6* 8.7*   HEMATOCRIT 32.1* 32.4* 30.0*   MCV 86.8 85.0 87.0   MCH 25.4* 25.2* 25.2*   RDW 56.2* 54.6* 57.2*   PLATELETCT 188 220 207   MPV 10.2 10.8 11.0   NEUTSPOLYS 63.50 74.70* 70.20   LYMPHOCYTES 20.60* 11.10* 16.00*   MONOCYTES 11.00 10.00 10.00   EOSINOPHILS 2.80 2.60 2.40   BASOPHILS 0.30 0.30 0.30   RBCMORPHOLO  --   --  Present     Recent Labs     11/23/20 0440 11/24/20  0530 11/25/20  0502   SODIUM 137 135 134*   POTASSIUM 3.8 4.4 4.3   CHLORIDE 97 96 95*   CO2 36* 35* 32   GLUCOSE 104* 139* 113*   BUN 16 18 20     Recent Labs     11/23/20 0440 11/24/20  0530 11/25/20  0502   ALBUMIN 2.5* 2.3* 2.2*   TBILIRUBIN 0.5 0.6 0.7   ALKPHOSPHAT 86 96 100*   TOTPROTEIN 5.2* 5.4* 5.2*   ALTSGPT 6 <5 6   ASTSGOT 16 20 17   CREATININE 0.70 0.75 0.77       Imaging:  Ct-abdomen-pelvis W/o    Result Date: 11/17/2020 11/17/2020 1:51 PM HISTORY/REASON FOR EXAM:  Nausea/vomiting; elevated WBC; R/O abscess. TECHNIQUE/EXAM DESCRIPTION: CT scan of the abdomen and pelvis without contrast.  Noncontrast helical scanning was obtained from the diaphragmatic domes through the pubic symphysis. Low dose optimization technique was utilized for this CT exam including automated exposure control and adjustment of the mA and/or kV according to patient size. COMPARISON: 11/8/2020. FINDINGS: There is a moderate left and small right pleural effusion with overlying atelectasis/consolidation. Trace free air is seen in the abdomen. Evaluation of parenchymal and vascular structures is limited by the lack of intravenous contrast. The liver appears  unremarkable. There are gallstones within the gallbladder. There is hypodensity along the anterior aspect of the spleen measuring approximately 4.3 x 2.9 cm. No adrenal mass is identified. There is right renal atrophy with cortical scarring. There is no  evidence of hydronephrosis. Pancreas appears unremarkable. Aorta is not aneurysmal. There are small inguinal, retroperitoneal and mesenteric lymph nodes. There is no evidence of bowel obstruction. Visualized appendix appears unremarkable. There is a left abdominal stoma. There is parastomal density measuring up to 2.8 x 2.1 cm likely representing a postsurgical hematoma. There is a small amount of free fluid in the abdomen and pelvis. Bladder is mildly distended. There is a surgical drain in the left abdomen. There is third spacing. Degenerative changes are seen in the spine. Compression deformities of T11 and T12 are again noted. Bones are demineralized.     Postsurgical changes with a left abdominal stoma. Parastomal densities may be related to postsurgical hematoma measuring up to 2.8 x 2.1 cm. Small amount of free fluid in the abdomen and pelvis. Collection along the anterior spleen measures approximately 2.9 x 4.3 cm is decreased in size compared to the prior examination. Evaluation is limited by lack of intravenous contrast. Third spacing. Trace free intraperitoneal air is likely related to recent surgery. Moderate left  and small right pleural effusions with overlying atelectasis/consolidation. Cortical scarring of the right kidney. Cholelithiasis.     Ct-abdomen-pelvis With    Result Date: 11/9/2020 11/8/2020 11:34 PM HISTORY/REASON FOR EXAM:  Abdominal infection suspected; Has perisplenic abscess with possible connection to bowel TECHNIQUE/EXAM DESCRIPTION: CT of the abdomen and pelvis with contrast. Contrast-enhanced helical scanning was obtained from the diaphragmatic domes through the pubic symphysis following the bolus administration of 100 mL of nonionic contrast (Omnipaque 350) without complication. Low dose optimization technique was utilized for this CT exam including automated exposure control and adjustment of the mA and/or kV according to patient size. COMPARISON: 11/3/2020 CT FINDINGS: The visualized lung bases show diminished small left pleural fluid that layers dependently. There is similar middle and left lower lobe atelectasis . Similar multichamber cardiac enlargement CT Abdomen: There is a new pigtail catheter in the anterior perisplenic fluid collection. The collection does diminish in size now measuring 58 x 33 from 79 x 40 mm. There is still gas within it and it is in direct contact with the splenic flexure of the colon. A small amount of gas is also seen in the more superficial soft tissues inferior to the pigtail catheter which crosses the ninth intercostal space No new abscess is detected Contrast is present in the small bowel but not yet in the transverse colon. No contrast extravasation is seen. No abnormal bowel dilatation The pancreas, adrenal glands and kidneys enhance normally. Mild hepatosplenomegaly Some low-density in the gallbladder likely indicating cholelithiasis There is no lymphadenopathy or aneurysmal change of the aorta. Compression deformities in the spine are again seen with subacute fractures resulting in anterior wedging at T11 and T12 There is diffuse fat stranding indicating anasarca  CT Pelvis: Visualized pelvic structures are unremarkable. Prostate is within normal limits for age. No lymphadenopathy. There is no free air or free fluid.     Interval decrease in size of perisplenic abscess. Pigtail catheter now in place Abscess has likely fistulous communication with the abutting splenic flexure of the colon. Colon is otherwise normal in appearance Mild hepatosplenomegaly Decreasing small left pleural effusion Subacute anterior wedge compression fractures in the caudal thoracic spine    Ct-abdomen-pelvis With    Addendum Date: 11/5/2020    Addendum: A prior noncontrast CT of the abdomen and pelvis from [facility] dated 10/19/2020 was made available for comparison. On the prior study a perisplenic/subcapsular fluid collection was seen however did not contain air at that time and there was no discrete evidence of communication with the transverse colon. Communication with splenic flexure of the colon and air within the collection are new from the prior study.    Result Date: 11/5/2020  11/3/2020 4:02 PM HISTORY/REASON FOR EXAM:  Abd pain, unspecified; IV contrast only. TECHNIQUE/EXAM DESCRIPTION:   CT scan of the abdomen and pelvis with contrast. Contrast-enhanced helical scanning was obtained from the diaphragmatic domes through the pubic symphysis following the bolus administration of nonionic contrast without complication. 100 mL of Omnipaque 350 nonionic contrast was administered without complication. Low dose optimization technique was utilized for this CT exam including automated exposure control and adjustment of the mA and/or kV according to patient size. COMPARISON: 9/26/2018 FINDINGS: Chest Base: Small left pleural effusion with mild bibasilar atelectasis. Heart is mildly enlarged. Liver:  Diffuse hypoattenuation of the liver suggesting fatty infiltration. Moderately enlarged. Gallbladder: Cholelithiasis. Biliary tract: Nondilated. Pancreas: Normal. Spleen: There is a  rim-enhancing perisplenic fluid collection which contains air and which communicates with the splenic flexure of the colon. This most likely represents an abscess and measures about 8.7 x 7.9 cm. The anterior aspect of the spleen is nonenhancing with irregular margins could be related to infection of splenic parenchyma or infarction. Adrenals: Normal. Kidneys and Collecting Systems:  There is mild right renal cortical scarring. No hydronephrosis. No renal mass. Gastrointestinal tract:  No bowel obstruction. The appendix is normal. Peritoneum: No free air or free fluid. Reproductive organs:  Normal. Bladder:  Normal. Vessels:  Normal caliber. Lymph  Nodes:  No lymphadenopathy. Abdominal wall: Within normal limits. Bones:  Age indeterminate possibly subacute to chronic T11 and T12 superior endplate compression fractures.     1.  Peripherally enhancing perisplenic fluid collection which communicates with the splenic flexure of the colon and which contains foci of air is most consistent with an abscess/infected fluid collection. There is nonenhancement of the anterior aspect of the spleen suggesting infection/infarction. Surgical consultation is recommended. 2.  Hepatomegaly and hepatic steatosis. 3.  Cholelithiasis. 4.  Right renal cortical scarring. No hydronephrosis. 5.  Small left pleural effusion. Mild bibasilar atelectasis. 6.  Subacute to chronic appearing T11 and T12 superior endplate compression fractures. These findings were discussed with SVETLANA PAREDES on 11/3/2020 4:49 PM.     Ct-drain-peritoneal    Result Date: 11/4/2020 11/4/2020 5:19 PM HISTORY/REASON FOR EXAM:  Joya Splenic Fluid Collection TECHNIQUE/EXAM DESCRIPTION: LUQ parasplenic abscess drainage with CT guidance. Low dose optimization technique was utilized for this CT exam including automated exposure control and adjustment of the mA and/or kV according to patient size. PROCEDURE: Informed consent was obtained. SEDATION: Moderate sedation was  provided. Pulse oximetry and continuous cardiac monitoring by the nurse was performed throughout the exam. Intraservice time was 30 minutes. Localizing CT images were obtained with the patient in supine position. The skin was prepped with Betadine and draped in a sterile fashion. Following local anesthesia with 1% Lidocaine, a 17 G guiding needle was placed and needle path confirmed with CT. An Amplatz guidewire was placed and following serial tract dilatation, a 10 [place] pigtail locking catheter was placed. A specimen was collected and submitted for culture and sensitivity and Gram stain. Total of 6 mL of Brownish fluid was drained. The catheter was secured to the skin and connected to suction bulb drainage. Final CT images were obtained documenting catheter position. The patient tolerated the procedure well with no evidence of complication. COMPARISON: CT scan abdomen and pelvis 11/3/2020 FINDINGS: The final CT images show satisfactory catheter position within the target collection.     1.  CT guided perisplenic abscess catheter drainage. 2.  The current plan is to obtain a follow-up CT in 5-7 days..    [id number] View    Result Date: 11/20/2020 11/20/2020 2:00 PM HISTORY/REASON FOR EXAM:  Abdominal Pain. TECHNIQUE/EXAM DESCRIPTION AND NUMBER OF VIEWS:  1 view(s) of the abdomen. COMPARISON: None FINDINGS: No evidence for small bowel obstruction. Surgical clips present in the mid abdomen. LEFT mid abdominal stoma noted. Surgical drain present in the LEFT lateral abdomen. Catheter projects of the RIGHT midabdomen, possibly wound VAC. Degenerative change of lumbar spine.     1.  No evidence of bowel obstruction. 2.  Postoperative changes as described.    Dx-chest-portable (1 View)    Result Date: 11/17/2020 11/17/2020 9:19 AM HISTORY/REASON FOR EXAM: Elevated white blood cell count. TECHNIQUE/EXAM DESCRIPTION AND NUMBER OF VIEWS: Single portable view of the chest. COMPARISON: 11/10/2020 FINDINGS: There are  bilateral interstitial and airspace infiltrates. There is small bilateral pleural effusion. The heart is enlarged. Right subclavian central line is again seen.     1.  Bilateral airspace and interstitial infiltrates. 2.  Cardiomegaly.    Dx-chest-portable (1 View)    Result Date: 11/10/2020  11/10/2020 12:29 PM HISTORY/REASON FOR EXAM: new central line. TECHNIQUE/EXAM DESCRIPTION AND NUMBER OF VIEWS: Single AP view of the chest. COMPARISON: November 3 FINDINGS: Hardware: ] Tip overlies the cavoatrial junction Lungs: Linear middle lobe and left basilar opacity is seen. Volumes are low Pleura:  No pneumothorax is identified Heart and mediastinum: There is stable cardiac silhouette enlargement.     No pneumothorax identified following right subclavian line placement Worsening atelectasis with consolidation at the bases not excluded Stable cardiac silhouette enlargement    Dx-chest-portable (1 View)    Result Date: 11/3/2020  11/3/2020 3:41 PM HISTORY/REASON FOR EXAM:  Chest Pain. TECHNIQUE/EXAM DESCRIPTION AND NUMBER OF VIEWS: Single portable view of the chest. COMPARISON: September 10, 2020 FINDINGS: The cardiac size is enlarged. There is interstitial prominence. Lung aeration has improved. No pneumothorax. No obvious pleural effusion.     Slight interval interval improvement in congestive heart failure.    Us-renal    Result Date: 11/11/2020 11/11/2020 6:21 PM HISTORY/REASON FOR EXAM:  Abnormal Labs TECHNIQUE/EXAM DESCRIPTION: Renal ultrasound. COMPARISON:  None FINDINGS: The right kidney measures 9.39 cm. The left kidney measures 9.54 cm. There is no hydronephrosis. There are no abnormal calcifications. The bladder not fully the time of imaging.      Examination was limited due to poor sound penetration related to body habitus. Grossly normal appearance of the kidneys.    Ec-echocardiogram Complete W/ Cont    Result Date: 11/11/2020  Transthoracic Echo Report Echocardiography Laboratory CONCLUSIONS Normal left  ventricular chamber size. Moderately reduced left ventricular systolic function. Left ventricular ejection fraction is visually estimated to be 30-40%; variable related to atrial fibrillation. Mild mitral regurgitation. Moderately dilated right ventricle. Reduced right ventricular systolic function. Right heart pressures are normal. No prior study is available for comparison. LV EF:        % FINDINGS Left Ventricle 3 mL of contrast was administered. Contrast was used to enhance visualization of the endocardial border. Existing IV was used. Mild concentric left ventricular hypertrophy. Normal left ventricular chamber size.  Moderately reduced left ventricular systolic function. Left ventricular ejection fraction is visually estimated to be 30-40%. Diastolic function is difficult to assess with atrial fibrillation. Right Ventricle Moderately dilated right ventricle. Reduced right ventricular systolic function. Right Atrium Enlarged right atrium. Normal inferior vena cava size and inspiratory collapse. Left Atrium Normal left atrial size. Mitral Valve Structurally normal mitral valve. Mild mitral regurgitation. No mitral stenosis. Aortic Valve Tricuspid aortic valve. Structurally normal aortic valve. No stenosis or regurgitation seen. Tricuspid Valve Structurally normal tricuspid valve. Mild tricuspid regurgitation. Right atrial pressure is estimated to be 3 mmHg. Estimated right ventricular systolic pressure  is 25 mmHg. No tricuspid stenosis. Right heart pressures are normal. Pulmonic Valve Structurally normal pulmonic valve without significant stenosis or regurgitation. Pericardium Normal pericardium without effusion. Aorta The aortic root is normal.  Ascending aorta diameter is 3.6 cm. Germain Pacheco M.D. (Electronically Signed) Final Date:     11 November 2020                 12:38 Amended:        11 November 2020 12:40    CT scan 11/24  CT Abdomen:  The liver, adrenal glands, and pancreas are  unremarkable.     There are multiple nitrogen-containing calculi in the gallbladder.     Hypodense collection in the anterior spleen has increased in size to approximately 5.8 x 4.6 cm, previously 4.2 x 2.9 cm. There is stranding in the adjacent fat.     The kidneys enhance symmetrically. There is right renal scarring.     Left abdominal colostomy is again noted with small adjacent hematomas in the subcutaneous fat. There is residual contrast in the distal colon. No contrast extravasation or pneumoperitoneum is appreciated.     There are calcifications in the arteries.     Micro:  Results     ** No results found for the last 168 hours. **          Assessment:  Active Hospital Problems    Diagnosis   • *Spontaneous perforation of colon (HCC) [K63.1]   • Leucocytosis [D72.829]   • Schizophrenia (HCC) [F20.9]   • Sepsis (HCC) [A41.9]   • Elevated digoxin level [R78.89]   • SRUTHI (acute kidney injury) (HCC) [N17.9]   • Adrenal insufficiency (HCC) [E27.40]   • LV dysfunction [I51.9]   • Paroxysmal A-fib with RVR (HCC) [I48.0]   • Type 2 diabetes mellitus, without long-term current use of insulin, with peripheral neuropathy (HCC) [E11.9]   • Hypothyroidism [E03.9]   • Prolonged QT interval [R94.31]     Hospital Course:   CT scan on 11/3 revealed a peripherally enhancing perisplenic fluid collection measuring 8.7 x 7.9 cm, communicating with the splenic flexure of the colon and contained foci of air consistent with abscess/infected fluid.  Also noted nonenhancing of the anterior aspect of the spleen suggesting infection/infarction.    Status post surgery on 11/10/2020    Leukocytosis -persistent. Decreased     Acute kidney injury- improved   Renal ultrasound-unremarkable  Nephrology following  Avoid nephrotoxins     Perisplenic abscess with possible communication to the bowel  Left upper quadrant abscess     Recent ground-level fall complicated by a subcapsular splenic hematoma  Afebrile  Splenic enhancement on CT,  suggesting infection or infarction  -s/p drainage 11/4 and peritoneal fluid Efaecalis, ampicillin sensitive and C albicans  S/p segmental colectomy, colostomy, irrigation and debridement of flank wound and wound VAC placement on 11/10/2020 by Dr. Desouza.  Patient found to have a left upper quadrant abscess inferior to the spleen with a large bowel wall defect.  Also noted to have an abdominal wall abscess.  Op cultures (colon perforation) - E faecalis and Candida albicans.  Gram stain+ GPC, GPR  -CT abdomen pelvis without on 11/17-hypodensity along anterior aspect of the spleen measuring 4.3 x 2.9 cm.  Parastomal density measuring two-point by 2.1 cm likely representing postsurgical hematoma.  This is decreased in size from prior.  Small amount of free fluid in the abdomen pelvis.  -Drain removed on 11/21  -CT 11/24 with enlarging anterior splenic fluid collection  Deemed nonsurical-Surgery recommending IR drainage. Please send fluif for culture     Continue vancomycin to treat the Enterococcus (PCN allergic),  Continue ceftriaxone to provide gram-negative coverage.    Continue fluconazole 400 mg daily  for the Candida  Anticipate 10 day course from date of last surgery/source control     Diabetes mellitus  Keep BS under 150 to help control current infection  -127     A. Fib  Contributing to hypotension  Cards following        Antibiotic allergies: Daptomycin rash, penicillins rash and hives, tolerates cephalosporins-discussed allergies and he states that he has developed significant rash and hives with penicillins several times, most recently a year and a half ago- will avoid penicillins

## 2020-11-25 NOTE — PROGRESS NOTES
Pharmacy Kinetics 55 y.o. male on vancomycin day # 9 2020    Currently Dose: Vancomycin 1750 mg iv q24hr (~10 mg/kg/dose)  Received Load Dose: Yes     Indication for Treatment: IAI/abscess  Provider Specified End Date: None  ID Service Following: Yes     Pertinent history per medical record: Admitted on 11/3/2020 for concern of abdominal pain after surgical intervention. Concern for subsequent colonic perforation and possible abscess. ID and surgery consulted.     Other antibiotics: ceftriaxone 2 gm iv q24h, fluconazole 400 mg po q24h     Allergies: Daptomycin, Pcn [penicillins], Unasyn [ampicillin-sulbactam sodium], and Vancomycin      List concerns for accumulation of vancomycin: BMI ~57, BUN:SCr > 20:1     Pertinent cultures to date:   Results     Procedure Component Value Units Date/Time    FLUID CULTURE W/GRAM STAIN [[id number]]     Order Status: No result Specimen: Body Fluid from Peritoneal Fluid         MRSA nares swab if pneumonia is a concern (ordered/positive/negative/n-a): n/a    Recent Labs     20  0440 20  0530 20  0502   WBC 13.6* 16.8* 13.1*   NEUTSPOLYS 63.50 74.70* 70.20     Recent Labs     20  0440 20  0530 20  0502   BUN 16 18 20   CREATININE 0.70 0.75 0.77   ALBUMIN 2.5* 2.3* 2.2*     Recent Labs     20  0530   VANCOTROUGH 15.1     Intake/Output Summary (Last 24 hours) at 2020 0749  Last data filed at 2020 1941  Gross per 24 hour   Intake 1080 ml   Output 450 ml   Net 630 ml      /78   Pulse (!) 111   Temp 36.3 °C (97.4 °F)   Resp 20   Ht 1.829 m (6')   Wt (!) 194 kg (427 lb 11.1 oz)   SpO2 98%  Temp (24hrs), Av.6 °C (97.8 °F), Min:36.3 °C (97.3 °F), Max:37.1 °C (98.8 °F)    Estimated Creatinine Clearance: 185.9 mL/min (by C-G formula based on SCr of 0.77 mg/dL).    A/P   1. Vancomycin dose change: not indicated   2. Next vancomycin level: ~1-2 days (not ordered)  3. Goal trough: 10-15 mcg/mL  4. Comments: Tachycardic.  Afebrile. WBC elevated/improved. SCr increased slightly. CrCl likely inaccurate due to body habitus. Likely to order vancomycin level in ~1-2 days pending clinical course. Currently tolerating infusion at reduced rate. Likely intervention planned per chart review. Pharmacy will continue to follow.    Eric Peacock, PharmD

## 2020-11-25 NOTE — PROGRESS NOTES
Received report from day staff. Assumed pt care. Patient denies pain. AOX4. POC discussed. Tele monitor in place. Call light within reach, bed in lowest position, and personal items accessible.

## 2020-11-25 NOTE — CARE PLAN
Problem: Safety  Goal: Will remain free from injury  Outcome: PROGRESSING AS EXPECTED  Goal: Will remain free from falls  Outcome: PROGRESSING AS EXPECTED     Problem: Pain Management  Goal: Pain level will decrease to patient's comfort goal  Outcome: PROGRESSING AS EXPECTED     Problem: Skin Integrity  Goal: Risk for impaired skin integrity will decrease  Outcome: PROGRESSING AS EXPECTED

## 2020-11-26 LAB
ALBUMIN SERPL BCP-MCNC: 2.1 G/DL (ref 3.2–4.9)
ALBUMIN/GLOB SERPL: 0.7 G/DL
ALP SERPL-CCNC: 81 U/L (ref 30–99)
ALT SERPL-CCNC: <5 U/L (ref 2–50)
ANION GAP SERPL CALC-SCNC: 6 MMOL/L (ref 7–16)
AST SERPL-CCNC: 13 U/L (ref 12–45)
BASOPHILS # BLD AUTO: 0.4 % (ref 0–1.8)
BASOPHILS # BLD: 0.05 K/UL (ref 0–0.12)
BILIRUB SERPL-MCNC: 0.8 MG/DL (ref 0.1–1.5)
BUN SERPL-MCNC: 21 MG/DL (ref 8–22)
CALCIUM SERPL-MCNC: 8.3 MG/DL (ref 8.5–10.5)
CHLORIDE SERPL-SCNC: 95 MMOL/L (ref 96–112)
CO2 SERPL-SCNC: 33 MMOL/L (ref 20–33)
CREAT SERPL-MCNC: 0.82 MG/DL (ref 0.5–1.4)
EOSINOPHIL # BLD AUTO: 0.25 K/UL (ref 0–0.51)
EOSINOPHIL NFR BLD: 2.1 % (ref 0–6.9)
ERYTHROCYTE [DISTWIDTH] IN BLOOD BY AUTOMATED COUNT: 56.7 FL (ref 35.9–50)
GLOBULIN SER CALC-MCNC: 3.1 G/DL (ref 1.9–3.5)
GLUCOSE BLD-MCNC: 108 MG/DL (ref 65–99)
GLUCOSE BLD-MCNC: 129 MG/DL (ref 65–99)
GLUCOSE BLD-MCNC: 164 MG/DL (ref 65–99)
GLUCOSE SERPL-MCNC: 133 MG/DL (ref 65–99)
GRAM STN SPEC: NORMAL
HCT VFR BLD AUTO: 28.4 % (ref 42–52)
HGB BLD-MCNC: 8.6 G/DL (ref 14–18)
IMM GRANULOCYTES # BLD AUTO: 0.09 K/UL (ref 0–0.11)
IMM GRANULOCYTES NFR BLD AUTO: 0.8 % (ref 0–0.9)
LYMPHOCYTES # BLD AUTO: 1.92 K/UL (ref 1–4.8)
LYMPHOCYTES NFR BLD: 16.3 % (ref 22–41)
MCH RBC QN AUTO: 26 PG (ref 27–33)
MCHC RBC AUTO-ENTMCNC: 30.3 G/DL (ref 33.7–35.3)
MCV RBC AUTO: 85.8 FL (ref 81.4–97.8)
MONOCYTES # BLD AUTO: 1.27 K/UL (ref 0–0.85)
MONOCYTES NFR BLD AUTO: 10.8 % (ref 0–13.4)
NEUTROPHILS # BLD AUTO: 8.19 K/UL (ref 1.82–7.42)
NEUTROPHILS NFR BLD: 69.6 % (ref 44–72)
NRBC # BLD AUTO: 0 K/UL
NRBC BLD-RTO: 0 /100 WBC
PLATELET # BLD AUTO: 234 K/UL (ref 164–446)
PMV BLD AUTO: 11 FL (ref 9–12.9)
POTASSIUM SERPL-SCNC: 4.5 MMOL/L (ref 3.6–5.5)
PROT SERPL-MCNC: 5.2 G/DL (ref 6–8.2)
RBC # BLD AUTO: 3.31 M/UL (ref 4.7–6.1)
SIGNIFICANT IND 70042: NORMAL
SITE SITE: NORMAL
SODIUM SERPL-SCNC: 134 MMOL/L (ref 135–145)
SOURCE SOURCE: NORMAL
WBC # BLD AUTO: 11.8 K/UL (ref 4.8–10.8)

## 2020-11-26 PROCEDURE — 85025 COMPLETE CBC W/AUTO DIFF WBC: CPT

## 2020-11-26 PROCEDURE — 700102 HCHG RX REV CODE 250 W/ 637 OVERRIDE(OP): Performed by: PSYCHIATRY & NEUROLOGY

## 2020-11-26 PROCEDURE — 36415 COLL VENOUS BLD VENIPUNCTURE: CPT

## 2020-11-26 PROCEDURE — 82962 GLUCOSE BLOOD TEST: CPT | Mod: 91

## 2020-11-26 PROCEDURE — 99232 SBSQ HOSP IP/OBS MODERATE 35: CPT | Mod: GC | Performed by: HOSPITALIST

## 2020-11-26 PROCEDURE — 770020 HCHG ROOM/CARE - TELE (206)

## 2020-11-26 PROCEDURE — 700111 HCHG RX REV CODE 636 W/ 250 OVERRIDE (IP): Performed by: HOSPITALIST

## 2020-11-26 PROCEDURE — A9270 NON-COVERED ITEM OR SERVICE: HCPCS | Performed by: INTERNAL MEDICINE

## 2020-11-26 PROCEDURE — A9270 NON-COVERED ITEM OR SERVICE: HCPCS | Performed by: STUDENT IN AN ORGANIZED HEALTH CARE EDUCATION/TRAINING PROGRAM

## 2020-11-26 PROCEDURE — 700102 HCHG RX REV CODE 250 W/ 637 OVERRIDE(OP): Performed by: STUDENT IN AN ORGANIZED HEALTH CARE EDUCATION/TRAINING PROGRAM

## 2020-11-26 PROCEDURE — 80053 COMPREHEN METABOLIC PANEL: CPT

## 2020-11-26 PROCEDURE — A9270 NON-COVERED ITEM OR SERVICE: HCPCS | Performed by: PSYCHIATRY & NEUROLOGY

## 2020-11-26 PROCEDURE — 700111 HCHG RX REV CODE 636 W/ 250 OVERRIDE (IP): Performed by: INTERNAL MEDICINE

## 2020-11-26 PROCEDURE — 99233 SBSQ HOSP IP/OBS HIGH 50: CPT | Performed by: INTERNAL MEDICINE

## 2020-11-26 PROCEDURE — 700105 HCHG RX REV CODE 258: Performed by: HOSPITALIST

## 2020-11-26 PROCEDURE — 700111 HCHG RX REV CODE 636 W/ 250 OVERRIDE (IP): Performed by: STUDENT IN AN ORGANIZED HEALTH CARE EDUCATION/TRAINING PROGRAM

## 2020-11-26 PROCEDURE — 700105 HCHG RX REV CODE 258: Performed by: INTERNAL MEDICINE

## 2020-11-26 PROCEDURE — 700102 HCHG RX REV CODE 250 W/ 637 OVERRIDE(OP): Performed by: INTERNAL MEDICINE

## 2020-11-26 RX ADMIN — AMIODARONE HYDROCHLORIDE 200 MG: 200 TABLET ORAL at 04:51

## 2020-11-26 RX ADMIN — ALTEPLASE 2 MG: 2.2 INJECTION, POWDER, LYOPHILIZED, FOR SOLUTION INTRAVENOUS at 04:28

## 2020-11-26 RX ADMIN — METOPROLOL SUCCINATE 75 MG: 50 TABLET, EXTENDED RELEASE ORAL at 18:18

## 2020-11-26 RX ADMIN — TRAZODONE HYDROCHLORIDE 50 MG: 50 TABLET ORAL at 23:17

## 2020-11-26 RX ADMIN — LISINOPRIL 20 MG: 20 TABLET ORAL at 04:47

## 2020-11-26 RX ADMIN — HYDROCODONE BITARTRATE AND ACETAMINOPHEN 1 TABLET: 10; 325 TABLET ORAL at 10:47

## 2020-11-26 RX ADMIN — HYDROCODONE BITARTRATE AND ACETAMINOPHEN 1 TABLET: 10; 325 TABLET ORAL at 18:20

## 2020-11-26 RX ADMIN — Medication 400 MG: at 04:49

## 2020-11-26 RX ADMIN — VANCOMYCIN HYDROCHLORIDE 1750 MG: 500 INJECTION, POWDER, LYOPHILIZED, FOR SOLUTION INTRAVENOUS at 05:04

## 2020-11-26 RX ADMIN — FAMOTIDINE 20 MG: 20 TABLET, FILM COATED ORAL at 04:49

## 2020-11-26 RX ADMIN — FLUCONAZOLE 400 MG: 200 TABLET ORAL at 04:48

## 2020-11-26 RX ADMIN — LEVOTHYROXINE SODIUM 112 MCG: 112 TABLET ORAL at 04:48

## 2020-11-26 RX ADMIN — RISPERIDONE 1 MG: 1 TABLET ORAL at 18:18

## 2020-11-26 RX ADMIN — SPIRONOLACTONE 25 MG: 50 TABLET ORAL at 04:48

## 2020-11-26 RX ADMIN — CEFTRIAXONE SODIUM 2 G: 2 INJECTION, POWDER, FOR SOLUTION INTRAMUSCULAR; INTRAVENOUS at 04:47

## 2020-11-26 RX ADMIN — HYDROCODONE BITARTRATE AND ACETAMINOPHEN 1 TABLET: 10; 325 TABLET ORAL at 03:49

## 2020-11-26 RX ADMIN — CETIRIZINE HYDROCHLORIDE 10 MG: 10 TABLET, FILM COATED ORAL at 04:49

## 2020-11-26 RX ADMIN — INSULIN GLARGINE 10 UNITS: 100 INJECTION, SOLUTION SUBCUTANEOUS at 05:11

## 2020-11-26 RX ADMIN — RISPERIDONE 0.5 MG: 1 TABLET ORAL at 04:48

## 2020-11-26 RX ADMIN — MODAFINIL 100 MG: 100 TABLET ORAL at 05:12

## 2020-11-26 RX ADMIN — Medication 400 MG: at 18:18

## 2020-11-26 RX ADMIN — METOPROLOL SUCCINATE 75 MG: 50 TABLET, EXTENDED RELEASE ORAL at 04:47

## 2020-11-26 ASSESSMENT — PAIN DESCRIPTION - PAIN TYPE
TYPE: ACUTE PAIN
TYPE: ACUTE PAIN

## 2020-11-26 ASSESSMENT — ENCOUNTER SYMPTOMS
VOMITING: 0
DIARRHEA: 0
SHORTNESS OF BREATH: 0
COUGH: 0
FEVER: 0
ABDOMINAL PAIN: 1
NAUSEA: 0

## 2020-11-26 ASSESSMENT — FIBROSIS 4 INDEX: FIB4 SCORE: 1.492780982504933663

## 2020-11-26 NOTE — PROGRESS NOTES
Daily Progress Note:     Date of Service: 11/26/2020  Primary Team: [facility] IM Yellow Team   Attending: JONATHAN Myrick M.D.   Senior Resident: Dr. Ortega  Intern: Dr. Ortega  Contact:  [phone]    Chief Complaint:   Abdominal pain     Subjective  Left abdominal discomfort of 5/10 from IR drainage. He is feeling better compared yesterday. Patient denies dyspnea, headaches. No chills, malaise and fever. Patient states no hallucinations.     Interval history:  -Leukocytosis continues to trend down today.   -s/p IR drainage drained 8mls of purulent fluid  -gram stain showing gram positive cocci  -continuing vancomycin and ceftriaxone and fluconazole.   -continuing enteral nutrition       Consultants/Specialty:  ID  IR  Gen surgery    Review of Systems  Review of Systems   Constitutional: no chills and fever and malaise/fatigue.   HENT: Negative for sore throat.    Respiratory: Negative for cough, hemoptysis, sputum production and shortness of breath.    Cardiovascular: Negative for chest pain, palpitations, claudication and leg swelling.   Gastrointestinal: Negative for abdominal pain, nausea and vomiting.   Genitourinary: Negative for dysuria.   Musculoskeletal: Negative for myalgias.   Neurological: Positive for weakness. Negative for dizziness, focal weakness and seizures.   Psychiatric/Behavioral: Negative for memory loss. The patient is not nervous/anxious and does not have insomnia.           Objective Data:   Physical Exam:   Vitals:   Temp:  [35.9 °C (96.6 °F)-37.1 °C (98.8 °F)] 36.9 °C (98.4 °F)  Pulse:  [] 94  Resp:  [12-24] 18  BP: ()/(55-84) 104/62  SpO2:  [91 %-99 %] 91 %     Physical Exam  Constitutional:       General: He is not in acute distress     Appearance: Normal appearance. He is obese. He is not ill-appearing, toxic-appearing or diaphoretic.   HENT:      Head: Normocephalic.   Eyes:      General: No scleral icterus.     Pupils: Pupils are equal, round, and reactive to light.    Cardiovascular:      Rate and Rhythm: irregularly irregular.      Pulses: Normal pulses.      Heart sounds: Murmur present.   Pulmonary:      Effort: Pulmonary effort is normal. No respiratory distress.      Breath sounds: Normal breath sounds.   Abdominal:      General: Abdomen is flat. Bowel sounds are normal.      Palpations: Abdomen is soft. There is a BETTE drain with serosanguinous fluid with tinge of  Purulent fluid.   Wound vac intact without erythema.     Musculoskeletal:      Right lower leg: No edema.      Left lower leg: No edema.   Neurological:      General: No focal deficit present.      Mental Status: He is alert and oriented to person, place, and time. Mental status is at baseline.      Cranial Nerves: No cranial nerve deficit.   Psychiatric:         Mood and Affect: Mood normal.         Behavior: Behavior normal.         Thought Content: Thought content normal.         Judgment: Judgment normal.              Labs:   WBC 11.8 from 13.1  Hgb 8.6     Na 134  Crea 0.82    Alb 2.1  T bili 0.8    Glucose 108     Microbiology:  Purulent fluid drainage: gram stain showing positive cocci on 11/25       Imaging:   No new imaging    Problem Representation    Mr. Castro is a 56 y/o M w/ hx of schizophrenia with positive symptom of AVH initially on home sertaline, olanzapine, T2DM hgbA1c 7.8 on 11/10/2020, HFrEF of 30% on 11/2020  from ischemic and hypertensive cardiomyopathy on spirnolactone, furosemide and metoprolol initially was seen by ED for perispleenic hematoma was discharged and was admitted for persistent left abdominal pain found to have vj-spleenic abscess with bowel perforation s/p left spelinic flexure colectomy on colostomy bag with albicans candidemia and E. Faecalis sepsis. Pt  developed afib with RVR s/p surgery and was started on digoxin but was supratherapeutic likely d/t hemodynamic and septic SRUTHI; digoxin level improved, afib  on metoprolol, Pt also developed prolonged QT  Secondary to  polypharmacy from SSRI, linezolid and anti-emetics.     Currently leukocytosis trending down after IR drainage of abscess indicating good source control. Patient afib with RVR overnight likely from uncontrolled pain, which pt this morning complains of 5/10 abd pain.    Currently IR drainage of vj-splenic abscess uncomplicated, no splenic bleeding, hemostatics stable. Pending culture pleural fluid bug identification and speciation. Pending ID recs if needing imaging for re-evaluation of source control.            #[place] and E faecalis sepsis  #perisplenic Albicans and E. Faeclis abscess w/ adjust splenic flexure perforation  #s/p segmental colectomy on colostomy bag , Patti pouch,  #s/p I & D of abdominal wall abscess s/p drain pulled out that was draining abscess  #hypoalbuminemia/ caloric protein malnutrition     Latest abscess size bigger at 5.8 x 4.6 cm than previous 2.8 x 2.1 cm s/p IR drainage, leukocytosis downtrended. No vasoplegia hence continuing anti-hypertensives.     Plan  - follow up perisplenic fluid drainage culture   - continue to monitor WBC ct       - continue Fluconazole(11/14)  - continue vancomycin (11/17  - continue ceftriaxone  11/17  - continue enteral nutrition.       -appreciate ID recs  -appreciate gen surgery recs           #S/p surgical wound care for colostomy, segmental left splenic flexure colectomy.  #protein caloric malnutrition/hypoalbuminemia  #T2DM hgbA1c 7.8 on 11/2020 w/o CKD, but with small nerve and large nerve neuropathy---   #postural orthostatic hypotension likely 2/2 small nerve T2DM neuropathy vs physical decompensation      Plan  -continue to monitor Phos, Mg  Level for refeeding syndrome.       -Continue PO enteral feeds  -continue basal lantus 10u qdaily  -continue ISS w/ hypoglycemia protocol w/ target of 140-180 glucose   -holding off metformin  -consulting nutrition if needed       #Paroxysmal A-fib with RVR (HCC)- (present on admission)  #supratherapuetic  digoxin -- resolved.     Stable now, last night tachycardic afib 2/2 uncontrolled pain.    Not on ACC 2/2 fall risk 2/2 postural orthostatic intolerance and hypotension   CHADSVASC of 3; HASBLED 2 but pt is fall risk.         Plan  -continue to monitor Mg >2, Phos >3 and K >4, replete prn    - continue metop to 75 mg BD  - continue amiodarone 200 mg daily        #Schizophrenia (HCC) with positive sign  #SI w/o plan --unresolved.  #prolong QTc 2/2 polypharmacy     Stable. No AVH overnight     Plan  - continue to monitor QTc  - continue to monitor CBC     - continue risperidone dose 0.5mg qAM and 1.0 mg qnightly    -continue modafinil  - continue trazadone           #HFrEF of 30% NYHA III-IV, Stage C  #ischemic and hypertensive cardiomyopahty   #b/l pleural effusion 2/2 surgical volume maintenance.   Stable.      Plan  -continue  metoprolol 75 mg BID  -continue lisinopril 20 mg qdaily,           #Subclinical Hypothyroidism  #Hypoalbuminemia/protein-caloric malnutrition  #Sepsis   #paroxysmal Afib w/ RVR   #normocytic anemia --likely from Anemia of inflammation, chronic disease.      Stable . Cause of hypothyroidism is multifactorial but willl treat  TSH 16.0 (prior was 8.0) and T4 1.14     Plan  - continue on Levothyroxine 112mcg daily.  - continue iron supplement  - continue metoclopramide and prochlorpazine for nausea/vomiting      #DJD  Stable     Plan  -continue PRN noroc 10/325 q6hrs prn         #hemodynamic SRUTHI (acute kidney injury) ([facility])  Assessment & Plan  Resolved     T/L/D  1x PIV, 1 left BETTE drain  1 surgical wound vac at [facility]   On Famotiidne for GI prophjylaxis   DVT: SCDs  Diabetic diet   Abx: vancomycin, ceftrioxone, fluconozaole.   Dispo: 1-3 days at telemetry unit.

## 2020-11-26 NOTE — PROGRESS NOTES
Patient underwent a peritoneal drain placement by Dr. Peres, assisted by Peterson ESTEVEZ. Patient, this RN and MD signed consent prior to sedation medication     Patient was placed in a supine position. Vitals were taken every 5 minutes and remained stable during procedure (see doc flow sheet for results). CO2 waveform capnography was monitored and remained 23-40 throughout procedure. Patient appeared to tolerate procedure well, all sedation medication given at discretion of MD Peres. Drain sutured to patient by MD Peres, then secured with gauze and medipore tape to LLQ abdomen. Drain to suction bulb. Report called to Therese PÉREZ. Pt transported by bed with this ACLS RN to [facility]. 12 mls thick brown/pink fluid sent to microbiology, hand delivered by Peterson ESTEVEZ     [employer] Flexima APDL Locking Pigtail Drainage Catheter System 12 Fr   Ref# [id number] Lot# [id number] Exp Date 10/17/2023

## 2020-11-26 NOTE — PROGRESS NOTES
12Fr drain placed yesterday, WBC normal  Culture pending  Ostomy functioning  Tolerating diet well  Wound care for midline VAC  F/u Dr. Desouza outpatient  Surgery signs off, please reconsult with any further questions/concerns

## 2020-11-26 NOTE — PROGRESS NOTES
Infectious Disease Progress Note    Author: Cristy Holley M.D. Date & Time of service: 11/26/2020  11:20 AM    Chief Complaint:  Intrabdominal abscess and bacteremia     Interval History:   55 y.o. diabetic male admitted 11/3/2020.+ A. fib, CHD, and severe morbid obesity.  Seen by ID in the past for right foot abscess with cultures positive for MRSA and Proteus.  s/p GLF approximately 1 month ago and was admitted to [facility] with left upper quadrant pain.  CT at the time revealed a subcapsular splenic hematoma. Presented to ED complaining of abdominal pain.  11/9 AF WBC 9.4 Less pain in side but remains tender to touch. Denies SE Zyvox-counseled about sxs serotonin syndrome  11/11 T-max 99.7 WBC 15.4 patient underwent colectomy, colostomy placement and I&D of flank wound with wound VAC placement on 11/10/2020 by Dr. Desouza.  Postop course complicated by hypotension and transferred to the ICU.  On pressors.  Patient complaining of shortness of breath as well as lightheadedness.  Also has postop abdominal pain.  11/12 afebrile WBC 13.3.  Patient weaned off pressors overnight.  Operative cultures growing group D Enterococcus and yeast.  Patient feeling better today.  Improved shortness of breath.  Abdominal pain stable  11/13 afebrile WBC 13.8 patient continues to feel better.  He is inquiring when he can have food by mouth.  11/14 afebrile WBC 9.9.  Patient feeling a bit better today.  His diet has been slowly advanced.  He is however complaining of some sharp abdominal pain when he coughs  11/16  AF, O2 RA, abdominal pain and poor appetite.   11/17 AF, O2 1 L NC , WBC 19.1. Drain output 465 cc yesterday. Ongoing nausea, poor appetite and abdomen is quite tender on palpation.  Will broaden antibiotics.   11/18 AF, O2 1 L NC, WBC 18.1, Drain output- 610 cc. Patient still has abdominal pain and significant nausea.  Examined abdominal wound photos and discussed with wound care team.  He has a large wound  but clean appearing base with granulation tissue.  No obvious infection in the surrounding tissues.  Left side wound with packing in place, ongoing drainage but clearing .    AF, tachycardia and hypertensive , WBC 17.2, drain output-205 cc yesterday. C/o nausea and agree with plan to transition Flagyl to IV to see if this helps.     AF WBC 13.6 feels OK-abd pain controlled. Poor appetite and not eating much. Denies SE abx-nausea resolved   AF WBC 16 no new complaints-no new cough, dyspnea and abd pain the same. Appetite so-so.   AF WBC 13 CT reviewed H and H continues to decrease. Somnolent today-no acute events overnight   AF 11.8 had new drain placed-tolerated well-still poor appetite     Review of Systems:  Review of Systems   Constitutional: Negative for fever.   Respiratory: Negative for cough and shortness of breath.    Cardiovascular: Negative for chest pain.   Gastrointestinal: Positive for abdominal pain. Negative for diarrhea, nausea and vomiting.   All other systems reviewed and are negative.      Hemodynamics:  Temp (24hrs), Av.4 °C (97.6 °F), Min:35.9 °C (96.6 °F), Max:37.1 °C (98.8 °F)  Temperature: 35.9 °C (96.6 °F)  Pulse  Av.8  Min: 51  Max: 161   Blood Pressure: 102/75      Physical Exam:  Physical Exam  Vitals signs and nursing note reviewed.   Constitutional:       General: He is not in acute distress.     Appearance: He is obese. He is not ill-appearing, toxic-appearing or diaphoretic.   HENT:      Nose: No congestion or rhinorrhea.   Eyes:      General: No scleral icterus.     Extraocular Movements: Extraocular movements intact.      Pupils: Pupils are equal, round, and reactive to light.   Neck:      Musculoskeletal: No neck rigidity.   Cardiovascular:      Rate and Rhythm: Tachycardia present. Rhythm irregular.   Pulmonary:      Effort: Pulmonary effort is normal. No respiratory distress.      Breath sounds: No stridor. No wheezing.   Abdominal:       General: There is no distension.      Palpations: Abdomen is soft.      Tenderness: There is abdominal tenderness. There is no guarding.      Comments: Wound VAC in place, no sign infection in surrounding tissue  Ostomy  Drain serosanguinous   Musculoskeletal:      Right lower leg: Edema present.      Left lower leg: Edema present.   Lymphadenopathy:      Cervical: No cervical adenopathy.   Skin:     General: Skin is warm.      Coloration: Skin is not jaundiced.      Findings: Bruising present.   Neurological:      General: No focal deficit present.      Mental Status: He is alert and oriented to person, place, and time.   Psychiatric:         Mood and Affect: Mood normal.         Behavior: Behavior normal.         Meds:    Current Facility-Administered Medications:   •  diphenhydrAMINE  •  metoclopramide  •  risperiDONE  •  risperiDONE  •  magnesium oxide  •  modafinil  •  levothyroxine  •  HYDROcodone/acetaminophen  •  prochlorperazine  •  metoprolol SR  •  lisinopril  •  amiodarone  •  traZODone  •  vancomycin  •  Metoprolol Tartrate  •  Respiratory Therapy Consult  •  MD Alert...Vancomycin per Pharmacy  •  cefTRIAXone (ROCEPHIN) IV  •  spironolactone  •  cetirizine  •  famotidine  •  fluconazole  •  insulin glargine  •  insulin regular **AND** POC Blood Glucose **AND** NOTIFY MD and PharmD **AND** glucose **AND** dextrose 50%  •  albuterol    Labs:  Recent Labs     11/24/20 0530 11/25/20 0502 11/26/20  0719   WBC 16.8* 13.1* 11.8*   RBC 3.81* 3.45* 3.31*   HEMOGLOBIN 9.6* 8.7* 8.6*   HEMATOCRIT 32.4* 30.0* 28.4*   MCV 85.0 87.0 85.8   MCH 25.2* 25.2* 26.0*   RDW 54.6* 57.2* 56.7*   PLATELETCT 220 207 234   MPV 10.8 11.0 11.0   NEUTSPOLYS 74.70* 70.20 69.60   LYMPHOCYTES 11.10* 16.00* 16.30*   MONOCYTES 10.00 10.00 10.80   EOSINOPHILS 2.60 2.40 2.10   BASOPHILS 0.30 0.30 0.40   RBCMORPHOLO  --  Present  --      Recent Labs     11/24/20 0530 11/25/20  0502 11/26/20  0719   SODIUM 135 134* 134*   POTASSIUM 4.4  4.3 4.5   CHLORIDE 96 95* 95*   CO2 35* 32 33   GLUCOSE 139* 113* 133*   BUN 18 20 21     Recent Labs     11/24/20  0530 11/25/20  0502 11/26/20  0719   ALBUMIN 2.3* 2.2* 2.1*   TBILIRUBIN 0.6 0.7 0.8   ALKPHOSPHAT 96 100* 81   TOTPROTEIN 5.4* 5.2* 5.2*   ALTSGPT <5 6 <5   ASTSGOT 20 17 13   CREATININE 0.75 0.77 0.82       Imaging:  Ct-abdomen-pelvis W/o    Result Date: 11/17/2020 11/17/2020 1:51 PM HISTORY/REASON FOR EXAM:  Nausea/vomiting; elevated WBC; R/O abscess. TECHNIQUE/EXAM DESCRIPTION: CT scan of the abdomen and pelvis without contrast. Noncontrast helical scanning was obtained from the diaphragmatic domes through the pubic symphysis. Low dose optimization technique was utilized for this CT exam including automated exposure control and adjustment of the mA and/or kV according to patient size. COMPARISON: 11/8/2020. FINDINGS: There is a moderate left and small right pleural effusion with overlying atelectasis/consolidation. Trace free air is seen in the abdomen. Evaluation of parenchymal and vascular structures is limited by the lack of intravenous contrast. The liver appears  unremarkable. There are gallstones within the gallbladder. There is hypodensity along the anterior aspect of the spleen measuring approximately 4.3 x 2.9 cm. No adrenal mass is identified. There is right renal atrophy with cortical scarring. There is no  evidence of hydronephrosis. Pancreas appears unremarkable. Aorta is not aneurysmal. There are small inguinal, retroperitoneal and mesenteric lymph nodes. There is no evidence of bowel obstruction. Visualized appendix appears unremarkable. There is a left abdominal stoma. There is parastomal density measuring up to 2.8 x 2.1 cm likely representing a postsurgical hematoma. There is a small amount of free fluid in the abdomen and pelvis. Bladder is mildly distended. There is a surgical drain in the left abdomen. There is third spacing. Degenerative changes are seen in the spine.  Compression deformities of T11 and T12 are again noted. Bones are demineralized.     Postsurgical changes with a left abdominal stoma. Parastomal densities may be related to postsurgical hematoma measuring up to 2.8 x 2.1 cm. Small amount of free fluid in the abdomen and pelvis. Collection along the anterior spleen measures approximately 2.9 x 4.3 cm is decreased in size compared to the prior examination. Evaluation is limited by lack of intravenous contrast. Third spacing. Trace free intraperitoneal air is likely related to recent surgery. Moderate left and small right pleural effusions with overlying atelectasis/consolidation. Cortical scarring of the right kidney. Cholelithiasis.     Ct-abdomen-pelvis With    Result Date: 11/9/2020 11/8/2020 11:34 PM HISTORY/REASON FOR EXAM:  Abdominal infection suspected; Has perisplenic abscess with possible connection to bowel TECHNIQUE/EXAM DESCRIPTION: CT of the abdomen and pelvis with contrast. Contrast-enhanced helical scanning was obtained from the diaphragmatic domes through the pubic symphysis following the bolus administration of 100 mL of nonionic contrast (Omnipaque 350) without complication. Low dose optimization technique was utilized for this CT exam including automated exposure control and adjustment of the mA and/or kV according to patient size. COMPARISON: 11/3/2020 CT FINDINGS: The visualized lung bases show diminished small left pleural fluid that layers dependently. There is similar middle and left lower lobe atelectasis . Similar multichamber cardiac enlargement CT Abdomen: There is a new pigtail catheter in the anterior perisplenic fluid collection. The collection does diminish in size now measuring 58 x 33 from 79 x 40 mm. There is still gas within it and it is in direct contact with the splenic flexure of the colon. A small amount of gas is also seen in the more superficial soft tissues inferior to the pigtail catheter which crosses the ninth  intercostal space No new abscess is detected Contrast is present in the small bowel but not yet in the transverse colon. No contrast extravasation is seen. No abnormal bowel dilatation The pancreas, adrenal glands and kidneys enhance normally. Mild hepatosplenomegaly Some low-density in the gallbladder likely indicating cholelithiasis There is no lymphadenopathy or aneurysmal change of the aorta. Compression deformities in the spine are again seen with subacute fractures resulting in anterior wedging at T11 and T12 There is diffuse fat stranding indicating anasarca CT Pelvis: Visualized pelvic structures are unremarkable. Prostate is within normal limits for age. No lymphadenopathy. There is no free air or free fluid.     Interval decrease in size of perisplenic abscess. Pigtail catheter now in place Abscess has likely fistulous communication with the abutting splenic flexure of the colon. Colon is otherwise normal in appearance Mild hepatosplenomegaly Decreasing small left pleural effusion Subacute anterior wedge compression fractures in the caudal thoracic spine    Ct-abdomen-pelvis With    Addendum Date: 11/5/2020    Addendum: A prior noncontrast CT of the abdomen and pelvis from [facility] dated 10/19/2020 was made available for comparison. On the prior study a perisplenic/subcapsular fluid collection was seen however did not contain air at that time and there was no discrete evidence of communication with the transverse colon. Communication with splenic flexure of the colon and air within the collection are new from the prior study.    Result Date: 11/5/2020  11/3/2020 4:02 PM HISTORY/REASON FOR EXAM:  Abd pain, unspecified; IV contrast only. TECHNIQUE/EXAM DESCRIPTION:   CT scan of the abdomen and pelvis with contrast. Contrast-enhanced helical scanning was obtained from the diaphragmatic domes through the pubic symphysis following the bolus administration of nonionic contrast without  complication. 100 mL of Omnipaque 350 nonionic contrast was administered without complication. Low dose optimization technique was utilized for this CT exam including automated exposure control and adjustment of the mA and/or kV according to patient size. COMPARISON: 9/26/2018 FINDINGS: Chest Base: Small left pleural effusion with mild bibasilar atelectasis. Heart is mildly enlarged. Liver:  Diffuse hypoattenuation of the liver suggesting fatty infiltration. Moderately enlarged. Gallbladder: Cholelithiasis. Biliary tract: Nondilated. Pancreas: Normal. Spleen: There is a rim-enhancing perisplenic fluid collection which contains air and which communicates with the splenic flexure of the colon. This most likely represents an abscess and measures about 8.7 x 7.9 cm. The anterior aspect of the spleen is nonenhancing with irregular margins could be related to infection of splenic parenchyma or infarction. Adrenals: Normal. Kidneys and Collecting Systems:  There is mild right renal cortical scarring. No hydronephrosis. No renal mass. Gastrointestinal tract:  No bowel obstruction. The appendix is normal. Peritoneum: No free air or free fluid. Reproductive organs:  Normal. Bladder:  Normal. Vessels:  Normal caliber. Lymph  Nodes:  No lymphadenopathy. Abdominal wall: Within normal limits. Bones:  Age indeterminate possibly subacute to chronic T11 and T12 superior endplate compression fractures.     1.  Peripherally enhancing perisplenic fluid collection which communicates with the splenic flexure of the colon and which contains foci of air is most consistent with an abscess/infected fluid collection. There is nonenhancement of the anterior aspect of the spleen suggesting infection/infarction. Surgical consultation is recommended. 2.  Hepatomegaly and hepatic steatosis. 3.  Cholelithiasis. 4.  Right renal cortical scarring. No hydronephrosis. 5.  Small left pleural effusion. Mild bibasilar atelectasis. 6.  Subacute to chronic  appearing T11 and T12 superior endplate compression fractures. These findings were discussed with SVETLANA PAREDES on 11/3/2020 4:49 PM.     Ct-drain-peritoneal    Result Date: 11/4/2020 11/4/2020 5:19 PM HISTORY/REASON FOR EXAM:  Joya Splenic Fluid Collection TECHNIQUE/EXAM DESCRIPTION: LUQ parasplenic abscess drainage with CT guidance. Low dose optimization technique was utilized for this CT exam including automated exposure control and adjustment of the mA and/or kV according to patient size. PROCEDURE: Informed consent was obtained. SEDATION: Moderate sedation was provided. Pulse oximetry and continuous cardiac monitoring by the nurse was performed throughout the exam. Intraservice time was 30 minutes. Localizing CT images were obtained with the patient in supine position. The skin was prepped with Betadine and draped in a sterile fashion. Following local anesthesia with 1% Lidocaine, a 17 G guiding needle was placed and needle path confirmed with CT. An Amplatz guidewire was placed and following serial tract dilatation, a 10 Cypriot pigtail locking catheter was placed. A specimen was collected and submitted for culture and sensitivity and Gram stain. Total of 6 mL of Brownish fluid was drained. The catheter was secured to the skin and connected to suction bulb drainage. Final CT images were obtained documenting catheter position. The patient tolerated the procedure well with no evidence of complication. COMPARISON: CT scan abdomen and pelvis 11/3/2020 FINDINGS: The final CT images show satisfactory catheter position within the target collection.     1.  CT guided perisplenic abscess catheter drainage. 2.  The current plan is to obtain a follow-up CT in 5-7 days..    [id number] View    Result Date: 11/20/2020 11/20/2020 2:00 PM HISTORY/REASON FOR EXAM:  Abdominal Pain. TECHNIQUE/EXAM DESCRIPTION AND NUMBER OF VIEWS:  1 view(s) of the abdomen. COMPARISON: None FINDINGS: No evidence for small bowel  obstruction. Surgical clips present in the mid abdomen. LEFT mid abdominal stoma noted. Surgical drain present in the LEFT lateral abdomen. Catheter projects of the RIGHT midabdomen, possibly wound VAC. Degenerative change of lumbar spine.     1.  No evidence of bowel obstruction. 2.  Postoperative changes as described.    Dx-chest-portable (1 View)    Result Date: 11/17/2020 11/17/2020 9:19 AM HISTORY/REASON FOR EXAM: Elevated white blood cell count. TECHNIQUE/EXAM DESCRIPTION AND NUMBER OF VIEWS: Single portable view of the chest. COMPARISON: 11/10/2020 FINDINGS: There are bilateral interstitial and airspace infiltrates. There is small bilateral pleural effusion. The heart is enlarged. Right subclavian central line is again seen.     1.  Bilateral airspace and interstitial infiltrates. 2.  Cardiomegaly.    Dx-chest-portable (1 View)    Result Date: 11/10/2020  11/10/2020 12:29 PM HISTORY/REASON FOR EXAM: new central line. TECHNIQUE/EXAM DESCRIPTION AND NUMBER OF VIEWS: Single AP view of the chest. COMPARISON: November 3 FINDINGS: Hardware: ] Tip overlies the cavoatrial junction Lungs: Linear middle lobe and left basilar opacity is seen. Volumes are low Pleura:  No pneumothorax is identified Heart and mediastinum: There is stable cardiac silhouette enlargement.     No pneumothorax identified following right subclavian line placement Worsening atelectasis with consolidation at the bases not excluded Stable cardiac silhouette enlargement    Dx-chest-portable (1 View)    Result Date: 11/3/2020  11/3/2020 3:41 PM HISTORY/REASON FOR EXAM:  Chest Pain. TECHNIQUE/EXAM DESCRIPTION AND NUMBER OF VIEWS: Single portable view of the chest. COMPARISON: September 10, 2020 FINDINGS: The cardiac size is enlarged. There is interstitial prominence. Lung aeration has improved. No pneumothorax. No obvious pleural effusion.     Slight interval interval improvement in congestive heart failure.    Us-renal    Result Date:  11/11/2020 11/11/2020 6:21 PM HISTORY/REASON FOR EXAM:  Abnormal Labs TECHNIQUE/EXAM DESCRIPTION: Renal ultrasound. COMPARISON:  None FINDINGS: The right kidney measures 9.39 cm. The left kidney measures 9.54 cm. There is no hydronephrosis. There are no abnormal calcifications. The bladder not fully the time of imaging.      Examination was limited due to poor sound penetration related to body habitus. Grossly normal appearance of the kidneys.    Ec-echocardiogram Complete W/ Cont    Result Date: 11/11/2020  Transthoracic Echo Report Echocardiography Laboratory CONCLUSIONS Normal left ventricular chamber size. Moderately reduced left ventricular systolic function. Left ventricular ejection fraction is visually estimated to be 30-40%; variable related to atrial fibrillation. Mild mitral regurgitation. Moderately dilated right ventricle. Reduced right ventricular systolic function. Right heart pressures are normal. No prior study is available for comparison. LV EF:        % FINDINGS Left Ventricle 3 mL of contrast was administered. Contrast was used to enhance visualization of the endocardial border. Existing IV was used. Mild concentric left ventricular hypertrophy. Normal left ventricular chamber size.  Moderately reduced left ventricular systolic function. Left ventricular ejection fraction is visually estimated to be 30-40%. Diastolic function is difficult to assess with atrial fibrillation. Right Ventricle Moderately dilated right ventricle. Reduced right ventricular systolic function. Right Atrium Enlarged right atrium. Normal inferior vena cava size and inspiratory collapse. Left Atrium Normal left atrial size. Mitral Valve Structurally normal mitral valve. Mild mitral regurgitation. No mitral stenosis. Aortic Valve Tricuspid aortic valve. Structurally normal aortic valve. No stenosis or regurgitation seen. Tricuspid Valve Structurally normal tricuspid valve. Mild tricuspid regurgitation. Right atrial pressure  is estimated to be 3 mmHg. Estimated right ventricular systolic pressure  is 25 mmHg. No tricuspid stenosis. Right heart pressures are normal. Pulmonic Valve Structurally normal pulmonic valve without significant stenosis or regurgitation. Pericardium Normal pericardium without effusion. Aorta The aortic root is normal.  Ascending aorta diameter is 3.6 cm. Germain Pacheco M.D. (Electronically Signed) Final Date:     11 November 2020                 12:38 Amended:        11 November 2020 12:40    CT scan 11/24  CT Abdomen:  The liver, adrenal glands, and pancreas are unremarkable.     There are multiple nitrogen-containing calculi in the gallbladder.     Hypodense collection in the anterior spleen has increased in size to approximately 5.8 x 4.6 cm, previously 4.2 x 2.9 cm. There is stranding in the adjacent fat.     The kidneys enhance symmetrically. There is right renal scarring.     Left abdominal colostomy is again noted with small adjacent hematomas in the subcutaneous fat. There is residual contrast in the distal colon. No contrast extravasation or pneumoperitoneum is appreciated.     There are calcifications in the arteries.     Micro:  Results     Procedure Component Value Units Date/Time    GRAM STAIN [[id number]] Collected: 11/25/20 1652    Order Status: Completed Specimen: Wound Updated: 11/26/20 0968     Significant Indicator .     Source WND     Site Joya-splenic drain     Gram Stain Result Moderate WBCs.  Rare Gram positive cocci.      Narrative:      Collected By:[id number] ERLIN SPARROW  Joya-splenic collection.  Collected By:[id number] ERLIN SPARROW    CULTURE WOUND W/ GRAM STAIN [[id number]] Collected: 11/25/20 1652    Order Status: Completed Specimen: Other Updated: 11/25/20 1843    Narrative:      Collected By:[id number] ERLIN SPARROW  Joya-splenic collection.    FLUID CULTURE W/GRAM STAIN [[id number]] Collected: 11/25/20 1705    Order Status: Sent Specimen: Body Fluid from Peritoneal Fluid      FLUID CULTURE W/GRAM STAIN [[id number]] Collected: 11/25/20 1625    Order Status: Canceled Specimen: Other from Peritoneal Fluid           Assessment:  Active Hospital Problems    Diagnosis   • *Spontaneous perforation of colon (HCC) [K63.1]   • Leucocytosis [D72.829]   • Schizophrenia (HCC) [F20.9]   • Sepsis (HCC) [A41.9]   • Elevated digoxin level [R78.89]   • SRUTHI (acute kidney injury) (HCC) [N17.9]   • Adrenal insufficiency (HCC) [E27.40]   • LV dysfunction [I51.9]   • Paroxysmal A-fib with RVR (HCC) [I48.0]   • Type 2 diabetes mellitus, without long-term current use of insulin, with peripheral neuropathy (HCC) [E11.9]   • Hypothyroidism [E03.9]   • Prolonged QT interval [R94.31]     Hospital Course:   CT scan on 11/3 revealed a peripherally enhancing perisplenic fluid collection measuring 8.7 x 7.9 cm, communicating with the splenic flexure of the colon and contained foci of air consistent with abscess/infected fluid.  Also noted nonenhancing of the anterior aspect of the spleen suggesting infection/infarction.    Status post surgery on 11/10/2020    Leukocytosis -persistent. Decreased     Acute kidney injury- improved   Renal ultrasound-unremarkable  Nephrology following  Avoid nephrotoxins       Perisplenic abscess with possible communication to the bowel  Left upper quadrant abscess  s/p ground-level fall complicated by a subcapsular splenic hematoma  Afebrile  Splenic enhancement on CT, suggesting infection or infarction  -s/p drainage 11/4 and peritoneal fluid Efaecalis, ampicillin sensitive and C albicans  S/p segmental colectomy, colostomy, irrigation and debridement of flank wound and wound VAC placement on 11/10/2020 by Dr. Desouza.  Patient found to have a left upper quadrant abscess inferior to the spleen with a large bowel wall defect.  Also noted to have an abdominal wall abscess.  Op cultures (colon perforation) - E faecalis and Candida albicans.  Gram stain+ GPC, GPR  -CT abdomen pelvis without on  11/17-hypodensity along anterior aspect of the spleen measuring 4.3 x 2.9 cm.  Parastomal density measuring two-point by 2.1 cm likely representing postsurgical hematoma.  This is decreased in size from prior.  Small amount of free fluid in the abdomen pelvis.  -Drain removed on 11/21  -CT 11/24 with enlarging anterior splenic fluid collection  Deemed nonsurical-Surgery recommending IR drainage. Please send fluif for culture  -11/25 new drain placed     Continue vancomycin to treat the Enterococcus (PCN allergic),  Continue ceftriaxone to provide gram-negative coverage.    Continue fluconazole 400 mg daily  for the Candida  Anticipate 10 day course from date of last surgery/source control     Diabetes mellitus  Keep BS under 150 to help control current infection  -134        Antibiotic allergies: Daptomycin rash, penicillins rash and hives, tolerates cephalosporins-discussed allergies and he states that he has developed significant rash and hives with penicillins several times, most recently a year and a half ago- will avoid penicillins

## 2020-11-26 NOTE — OR SURGEON
Immediate Post- Operative Note        PostOp Diagnosis: anterior splenic abscess      Procedure(s): CT drain of same  8 mL pus to lab      Estimated Blood Loss: Less than 5 ml        Complications: None            11/25/2020     4:49 PM     Sathya Peres M.D.

## 2020-11-26 NOTE — PROGRESS NOTES
Pharmacy Kinetics 55 y.o. male on vancomycin day # 10 (2020)    Currently on Vancomycin 1750 mg iv q24hr  Provider specified end date: Tentatively planned for 10 days from date of last surgery/ source control (drains placed - tentative stop date of )    Indication for Treatment: IAI    Pertinent history per medical record: Admitted on 11/3/2020 for  concern of abdominal pain after surgical intervention. Concern for subsequent colonic perforation and possible abscess. ID and surgery consulted.    Other antibiotics: Ceftriaxone 2 grams q24H, fluconazole 400 mg q24H    Allergies: Daptomycin, Pcn [penicillins], Unasyn [ampicillin-sulbactam sodium], and Vancomycin     List concerns for renal function (possible concerns include abnormal LFTs, BUN/SCr ratio > 20:1, CHF, obesity, malnutrition/low albumin, hypermetabolic state (SIRS), pressors/hypotension, nephrotoxic drugs, etc.): BMI, BUN:SCr> 20:1    Pertinent cultures to date:   11/10: Colonic perforation- e. faecalis    MRSA nares swab if pneumonia is a concern (ordered/positive/negative/n-a): N/A    Recent Labs     20  0530 20  0502 20  0719   WBC 16.8* 13.1* 11.8*   NEUTSPOLYS 74.70* 70.20 69.60     Recent Labs     20  0530 20  0502 20  0719   BUN 18 20 21   CREATININE 0.75 0.77 0.82   ALBUMIN 2.3* 2.2* 2.1*     Recent Labs     20  0530   VANCOTROUGH 15.1       Intake/Output Summary (Last 24 hours) at 2020 1138  Last data filed at 2020 0517  Gross per 24 hour   Intake 100 ml   Output 290 ml   Net -190 ml      /75   Pulse (!) 107   Temp 35.9 °C (96.6 °F) (Temporal)   Resp 18   Ht 1.829 m (6')   Wt (!) 200.5 kg (442 lb 0.4 oz)   SpO2 97%  Temp (24hrs), Av.4 °C (97.6 °F), Min:35.9 °C (96.6 °F), Max:37.1 °C (98.8 °F)      A/P   1. Vancomycin dose change: Not indicated at this time.  2. Next vancomycin level:  @ 0430 (ordered)  3. Goal trough: 10-15 mcg/mL  4. Comments: Patient  underwent IR drainage yesterday. ID recommending 10 days of antibiotics pending date of source control. Scr stable. WBC trending down. Will obtain vancomycin trough in AM. Pharmacy will continue to monitor.    Bee Ramsey, PharmD

## 2020-11-27 LAB
ALBUMIN SERPL BCP-MCNC: 2.4 G/DL (ref 3.2–4.9)
ANION GAP SERPL CALC-SCNC: 5 MMOL/L (ref 7–16)
BASOPHILS # BLD AUTO: 0.4 % (ref 0–1.8)
BASOPHILS # BLD: 0.05 K/UL (ref 0–0.12)
BILIRUB SERPL-MCNC: 0.6 MG/DL (ref 0.1–1.5)
BUN SERPL-MCNC: 27 MG/DL (ref 8–22)
CALCIUM SERPL-MCNC: 8.4 MG/DL (ref 8.5–10.5)
CHLORIDE SERPL-SCNC: 94 MMOL/L (ref 96–112)
CO2 SERPL-SCNC: 33 MMOL/L (ref 20–33)
CREAT SERPL-MCNC: 0.98 MG/DL (ref 0.5–1.4)
EOSINOPHIL # BLD AUTO: 0.34 K/UL (ref 0–0.51)
EOSINOPHIL NFR BLD: 3 % (ref 0–6.9)
ERYTHROCYTE [DISTWIDTH] IN BLOOD BY AUTOMATED COUNT: 54.3 FL (ref 35.9–50)
GLUCOSE BLD-MCNC: 124 MG/DL (ref 65–99)
GLUCOSE BLD-MCNC: 145 MG/DL (ref 65–99)
GLUCOSE SERPL-MCNC: 124 MG/DL (ref 65–99)
HCT VFR BLD AUTO: 26.4 % (ref 42–52)
HGB BLD-MCNC: 7.9 G/DL (ref 14–18)
IMM GRANULOCYTES # BLD AUTO: 0.09 K/UL (ref 0–0.11)
IMM GRANULOCYTES NFR BLD AUTO: 0.8 % (ref 0–0.9)
LYMPHOCYTES # BLD AUTO: 2.05 K/UL (ref 1–4.8)
LYMPHOCYTES NFR BLD: 18 % (ref 22–41)
MAGNESIUM SERPL-MCNC: 1.5 MG/DL (ref 1.5–2.5)
MCH RBC QN AUTO: 25.3 PG (ref 27–33)
MCHC RBC AUTO-ENTMCNC: 29.9 G/DL (ref 33.7–35.3)
MCV RBC AUTO: 84.6 FL (ref 81.4–97.8)
MONOCYTES # BLD AUTO: 1.16 K/UL (ref 0–0.85)
MONOCYTES NFR BLD AUTO: 10.2 % (ref 0–13.4)
NEUTROPHILS # BLD AUTO: 7.7 K/UL (ref 1.82–7.42)
NEUTROPHILS NFR BLD: 67.6 % (ref 44–72)
NRBC # BLD AUTO: 0 K/UL
NRBC BLD-RTO: 0 /100 WBC
PHOSPHATE SERPL-MCNC: 3.8 MG/DL (ref 2.5–4.5)
PLATELET # BLD AUTO: 229 K/UL (ref 164–446)
PMV BLD AUTO: 10.9 FL (ref 9–12.9)
POTASSIUM SERPL-SCNC: 4.6 MMOL/L (ref 3.6–5.5)
RBC # BLD AUTO: 3.12 M/UL (ref 4.7–6.1)
SODIUM SERPL-SCNC: 132 MMOL/L (ref 135–145)
VANCOMYCIN TROUGH SERPL-MCNC: 17.9 UG/ML (ref 10–20)
WBC # BLD AUTO: 11.4 K/UL (ref 4.8–10.8)

## 2020-11-27 PROCEDURE — 770006 HCHG ROOM/CARE - MED/SURG/GYN SEMI*

## 2020-11-27 PROCEDURE — 700105 HCHG RX REV CODE 258: Performed by: INTERNAL MEDICINE

## 2020-11-27 PROCEDURE — 700111 HCHG RX REV CODE 636 W/ 250 OVERRIDE (IP): Performed by: HOSPITALIST

## 2020-11-27 PROCEDURE — 82040 ASSAY OF SERUM ALBUMIN: CPT

## 2020-11-27 PROCEDURE — A9270 NON-COVERED ITEM OR SERVICE: HCPCS | Performed by: STUDENT IN AN ORGANIZED HEALTH CARE EDUCATION/TRAINING PROGRAM

## 2020-11-27 PROCEDURE — 302098 PASTE RING (FLAT): Performed by: HOSPITALIST

## 2020-11-27 PROCEDURE — 80048 BASIC METABOLIC PNL TOTAL CA: CPT

## 2020-11-27 PROCEDURE — 700105 HCHG RX REV CODE 258: Performed by: HOSPITALIST

## 2020-11-27 PROCEDURE — 80202 ASSAY OF VANCOMYCIN: CPT

## 2020-11-27 PROCEDURE — A9270 NON-COVERED ITEM OR SERVICE: HCPCS | Performed by: INTERNAL MEDICINE

## 2020-11-27 PROCEDURE — 99232 SBSQ HOSP IP/OBS MODERATE 35: CPT | Mod: GC | Performed by: HOSPITALIST

## 2020-11-27 PROCEDURE — 700102 HCHG RX REV CODE 250 W/ 637 OVERRIDE(OP): Performed by: STUDENT IN AN ORGANIZED HEALTH CARE EDUCATION/TRAINING PROGRAM

## 2020-11-27 PROCEDURE — 84100 ASSAY OF PHOSPHORUS: CPT

## 2020-11-27 PROCEDURE — 97606 NEG PRS WND THER DME>50 SQCM: CPT

## 2020-11-27 PROCEDURE — 700111 HCHG RX REV CODE 636 W/ 250 OVERRIDE (IP): Performed by: INTERNAL MEDICINE

## 2020-11-27 PROCEDURE — 82247 BILIRUBIN TOTAL: CPT

## 2020-11-27 PROCEDURE — 82962 GLUCOSE BLOOD TEST: CPT

## 2020-11-27 PROCEDURE — A9270 NON-COVERED ITEM OR SERVICE: HCPCS | Performed by: PSYCHIATRY & NEUROLOGY

## 2020-11-27 PROCEDURE — 83735 ASSAY OF MAGNESIUM: CPT

## 2020-11-27 PROCEDURE — 85025 COMPLETE CBC W/AUTO DIFF WBC: CPT

## 2020-11-27 PROCEDURE — 700102 HCHG RX REV CODE 250 W/ 637 OVERRIDE(OP): Performed by: INTERNAL MEDICINE

## 2020-11-27 PROCEDURE — 99233 SBSQ HOSP IP/OBS HIGH 50: CPT | Performed by: INTERNAL MEDICINE

## 2020-11-27 PROCEDURE — 700102 HCHG RX REV CODE 250 W/ 637 OVERRIDE(OP): Performed by: PSYCHIATRY & NEUROLOGY

## 2020-11-27 RX ADMIN — FAMOTIDINE 20 MG: 20 TABLET, FILM COATED ORAL at 06:52

## 2020-11-27 RX ADMIN — METOPROLOL SUCCINATE 75 MG: 50 TABLET, EXTENDED RELEASE ORAL at 06:50

## 2020-11-27 RX ADMIN — LEVOTHYROXINE SODIUM 112 MCG: 112 TABLET ORAL at 06:49

## 2020-11-27 RX ADMIN — HYDROCODONE BITARTRATE AND ACETAMINOPHEN 1 TABLET: 10; 325 TABLET ORAL at 12:44

## 2020-11-27 RX ADMIN — LISINOPRIL 20 MG: 20 TABLET ORAL at 09:07

## 2020-11-27 RX ADMIN — TRAZODONE HYDROCHLORIDE 50 MG: 50 TABLET ORAL at 20:45

## 2020-11-27 RX ADMIN — MODAFINIL 100 MG: 100 TABLET ORAL at 06:52

## 2020-11-27 RX ADMIN — CEFTRIAXONE SODIUM 2 G: 2 INJECTION, POWDER, FOR SOLUTION INTRAMUSCULAR; INTRAVENOUS at 06:53

## 2020-11-27 RX ADMIN — Medication 400 MG: at 17:02

## 2020-11-27 RX ADMIN — Medication 400 MG: at 06:52

## 2020-11-27 RX ADMIN — CETIRIZINE HYDROCHLORIDE 10 MG: 10 TABLET, FILM COATED ORAL at 06:52

## 2020-11-27 RX ADMIN — HYDROCODONE BITARTRATE AND ACETAMINOPHEN 1 TABLET: 10; 325 TABLET ORAL at 20:45

## 2020-11-27 RX ADMIN — HYDROCODONE BITARTRATE AND ACETAMINOPHEN 1 TABLET: 10; 325 TABLET ORAL at 06:50

## 2020-11-27 RX ADMIN — RISPERIDONE 1 MG: 1 TABLET ORAL at 17:02

## 2020-11-27 RX ADMIN — SPIRONOLACTONE 25 MG: 50 TABLET ORAL at 06:51

## 2020-11-27 RX ADMIN — HYDROCODONE BITARTRATE AND ACETAMINOPHEN 1 TABLET: 10; 325 TABLET ORAL at 00:42

## 2020-11-27 RX ADMIN — VANCOMYCIN HYDROCHLORIDE 1750 MG: 500 INJECTION, POWDER, LYOPHILIZED, FOR SOLUTION INTRAVENOUS at 06:54

## 2020-11-27 RX ADMIN — RISPERIDONE 0.5 MG: 1 TABLET ORAL at 06:52

## 2020-11-27 RX ADMIN — AMIODARONE HYDROCHLORIDE 200 MG: 200 TABLET ORAL at 06:49

## 2020-11-27 RX ADMIN — INSULIN GLARGINE 10 UNITS: 100 INJECTION, SOLUTION SUBCUTANEOUS at 07:03

## 2020-11-27 RX ADMIN — FLUCONAZOLE 400 MG: 200 TABLET ORAL at 06:50

## 2020-11-27 ASSESSMENT — ENCOUNTER SYMPTOMS
VOMITING: 0
COUGH: 0
CHILLS: 0
NAUSEA: 0
DIARRHEA: 0
ABDOMINAL PAIN: 1
FEVER: 0
SHORTNESS OF BREATH: 0

## 2020-11-27 ASSESSMENT — PAIN DESCRIPTION - PAIN TYPE
TYPE: ACUTE PAIN

## 2020-11-27 ASSESSMENT — FIBROSIS 4 INDEX: FIB4 SCORE: 1.53

## 2020-11-27 NOTE — DIETARY
Nutrition Services: Update   Day 24 of admit.  Sebastián Castro is a 55 y.o. male with admitting DX of Perforated sigmoid colon.    Pt is currently on Low Fiber, Consistent CHO diet. Providing Boost Glucose Control with meals. Recorded PO intake improved to 50-75% and % earlier this week on 11/24. PO intake not recorded 11/25 and 11/26. Breakfast today = 25-50% of supplement. Pt had a new drain placed 11/26 and he states his appetite has worsened since the drain placed. He does continue to drink supplements. He requests a banana at his HS snack.     Wound care continues for abdominal wound with wound vac.     Wt 11/26: 200.5 kg via bed scale - Weights have increased significantly from admit weight of 147 kg. I/O is +10,749 ml from admit. May be some fluid gain vs bed scale error.    Malnutrition Risk: Criteria not met.    Recommendations/Plan:  1. Continue Boost with meals.   2. Encourage intake of meals and supplements.  3. Document intake of all meals and supplements as % taken in ADL's to provide interdisciplinary communication across all shifts.   4. Monitor weight.  5. Nutrition rep will continue to see patient for ongoing meal and snack preferences.    RD following

## 2020-11-27 NOTE — PROGRESS NOTES
Pharmacy Kinetics 55 y.o. male on vancomycin day # 11 (2020)    Currently on Vancomycin 1750 mg iv q24hr  Provider specified end date: Tentatively planned for 10 days from date of last surgery/ source control (drains placed - tentative stop date of )    Indication for Treatment: IAI     Pertinent history per medical record: Admitted on 11/3/2020 for  concern of abdominal pain after surgical intervention. Concern for subsequent colonic perforation and possible abscess. ID and surgery consulted.     Other antibiotics: Ceftriaxone 2 grams q24H, fluconazole 400 mg q24H     Allergies: Daptomycin, Pcn [penicillins], Unasyn [ampicillin-sulbactam sodium], and Vancomycin      List concerns for renal function (possible concerns include abnormal LFTs, BUN/SCr ratio > 20:1, CHF, obesity, malnutrition/low albumin, hypermetabolic state (SIRS), pressors/hypotension, nephrotoxic drugs, etc.): BMI, BUN:SCr> 20:1     Pertinent cultures to date:   11/10: Colonic perforation- e. faecalis     MRSA nares swab if pneumonia is a concern (ordered/positive/negative/n-a): N/A    Recent Labs     20  0502 20  0719 20  0445   WBC 13.1* 11.8* 11.4*   NEUTSPOLYS 70.20 69.60 67.60     Recent Labs     20  0502 20  0719 20  0445   BUN 20 21 27*   CREATININE 0.77 0.82 0.98   ALBUMIN 2.2* 2.1* 2.4*     Recent Labs     20  0445   VANCOTROUGH 17.9       Intake/Output Summary (Last 24 hours) at 2020 1407  Last data filed at 2020 0900  Gross per 24 hour   Intake 240 ml   Output 260 ml   Net -20 ml      BP (!) 94/62   Pulse 98   Temp 36.4 °C (97.5 °F) (Temporal)   Resp 18   Ht 1.829 m (6')   Wt (!) 200.5 kg (442 lb 0.4 oz)   SpO2 97%  Temp (24hrs), Av.4 °C (97.6 °F), Min:35.9 °C (96.7 °F), Max:36.9 °C (98.4 °F)      A/P   1. Vancomycin dose change: Decrease vancomycin to 1250 mg q24H  2. Next vancomycin level: ~3 days or sooner pending renal function (will need to be  ordered)  3. Goal trough: 10-15 mcg/mL  4. Comments: Vancomycin trough came back slightly supra therapeutic at 17.9 mcg/mL, goal 10-15 mcg/mL. Slight increase in Scr. Daily CMP X 3 days ordered to monitor Scr. Given elevated trough, will decrease vancomycin to 1250 mg q24H, administered over six hours for patient tolerance. Pharmacy will CTM.    Bee Ramsey, PharmD

## 2020-11-27 NOTE — DISCHARGE PLANNING
Anticipated Discharge Disposition: [facility]    Action: Patient discussed in IDT rounds. He has advanced wound care needs, IV abx,, drain in place, and need for daily physician rounds so [facility] is preferred. RN BRANDON met with patient who gave verbal consent to send referrals to [facility], [facility]. Choice faxed to [facility].  Per Dr. Callaway, patient is medically cleared if accepted to [facility].  MD feels patient has too complex of needs for a SNF.    Barriers to Discharge: placement    Plan: Case coordination to f/u with LTACH referrals

## 2020-11-27 NOTE — PROGRESS NOTES
Infectious Disease Progress Note    Author: Cristy Holley M.D. Date & Time of service: 11/27/2020  1:22 PM    Chief Complaint:  Intrabdominal abscess and bacteremia     Interval History:   55 y.o. diabetic male admitted 11/3/2020.+ A. fib, CHD, and severe morbid obesity.  Seen by ID in the past for right foot abscess with cultures positive for MRSA and Proteus.  s/p GLF approximately 1 month ago and was admitted to [facility] with left upper quadrant pain.  CT at the time revealed a subcapsular splenic hematoma. Presented to ED complaining of abdominal pain.  11/9 AF WBC 9.4 Less pain in side but remains tender to touch. Denies SE Zyvox-counseled about sxs serotonin syndrome  11/11 T-max 99.7 WBC 15.4 patient underwent colectomy, colostomy placement and I&D of flank wound with wound VAC placement on 11/10/2020 by Dr. Desouza.  Postop course complicated by hypotension and transferred to the ICU.  On pressors.  Patient complaining of shortness of breath as well as lightheadedness.  Also has postop abdominal pain.  11/12 afebrile WBC 13.3.  Patient weaned off pressors overnight.  Operative cultures growing group D Enterococcus and yeast.  Patient feeling better today.  Improved shortness of breath.  Abdominal pain stable  11/13 afebrile WBC 13.8 patient continues to feel better.  He is inquiring when he can have food by mouth.  11/14 afebrile WBC 9.9.  Patient feeling a bit better today.  His diet has been slowly advanced.  He is however complaining of some sharp abdominal pain when he coughs  11/16  AF, O2 RA, abdominal pain and poor appetite.   11/17 AF, O2 1 L NC , WBC 19.1. Drain output 465 cc yesterday. Ongoing nausea, poor appetite and abdomen is quite tender on palpation.  Will broaden antibiotics.   11/18 AF, O2 1 L NC, WBC 18.1, Drain output- 610 cc. Patient still has abdominal pain and significant nausea.  Examined abdominal wound photos and discussed with wound care team.  He has a large wound but  clean appearing base with granulation tissue.  No obvious infection in the surrounding tissues.  Left side wound with packing in place, ongoing drainage but clearing .    AF, tachycardia and hypertensive , WBC 17.2, drain output-205 cc yesterday. C/o nausea and agree with plan to transition Flagyl to IV to see if this helps.     AF WBC 13.6 feels OK-abd pain controlled. Poor appetite and not eating much. Denies SE abx-nausea resolved   AF WBC 16 no new complaints-no new cough, dyspnea and abd pain the same. Appetite so-so.   AF WBC 13 CT reviewed H and H continues to decrease. Somnolent today-no acute events overnight   AF 11.8 had new drain placed-tolerated well-still poor appetite    AF WBC 11.4 cxs with enterococcus-tired today. No new complaints    Review of Systems:  Review of Systems   Constitutional: Negative for chills and fever.   Respiratory: Negative for cough and shortness of breath.    Gastrointestinal: Positive for abdominal pain. Negative for diarrhea, nausea and vomiting.   Skin: Negative for rash.   All other systems reviewed and are negative.      Hemodynamics:  Temp (24hrs), Av.4 °C (97.6 °F), Min:35.9 °C (96.7 °F), Max:36.9 °C (98.4 °F)  Temperature: 36.4 °C (97.5 °F)  Pulse  Av.6  Min: 51  Max: 161   Blood Pressure: (!) 94/62      Physical Exam:  Physical Exam  Vitals signs and nursing note reviewed.   Constitutional:       General: He is not in acute distress.     Appearance: He is obese. He is not ill-appearing, toxic-appearing or diaphoretic.   HENT:      Nose: No congestion or rhinorrhea.   Eyes:      General: No scleral icterus.     Extraocular Movements: Extraocular movements intact.      Pupils: Pupils are equal, round, and reactive to light.   Neck:      Musculoskeletal: No neck rigidity.   Cardiovascular:      Rate and Rhythm: Tachycardia present. Rhythm irregular.   Pulmonary:      Effort: Pulmonary effort is normal. No respiratory distress.       Breath sounds: No stridor. No wheezing.   Abdominal:      General: There is no distension.      Palpations: Abdomen is soft.      Tenderness: There is abdominal tenderness. There is no guarding.      Comments: Wound VAC in place, no sign infection in surrounding tissue  Ostomy  Drain serosanguinous   Musculoskeletal:      Right lower leg: Edema present.      Left lower leg: Edema present.   Lymphadenopathy:      Cervical: No cervical adenopathy.   Skin:     General: Skin is warm.      Coloration: Skin is not jaundiced.      Findings: Bruising present.   Neurological:      General: No focal deficit present.      Mental Status: He is alert and oriented to person, place, and time.   Psychiatric:         Mood and Affect: Mood normal.         Behavior: Behavior normal.         Meds:    Current Facility-Administered Medications:   •  diphenhydrAMINE  •  metoclopramide  •  risperiDONE  •  risperiDONE  •  magnesium oxide  •  modafinil  •  levothyroxine  •  HYDROcodone/acetaminophen  •  prochlorperazine  •  metoprolol SR  •  lisinopril  •  amiodarone  •  traZODone  •  vancomycin  •  Metoprolol Tartrate  •  Respiratory Therapy Consult  •  MD Alert...Vancomycin per Pharmacy  •  cefTRIAXone (ROCEPHIN) IV  •  spironolactone  •  cetirizine  •  famotidine  •  fluconazole  •  insulin glargine  •  insulin regular **AND** POC Blood Glucose **AND** NOTIFY MD and PharmD **AND** glucose **AND** dextrose 50%  •  albuterol    Labs:  Recent Labs     11/25/20  0502 11/26/20  0719 11/27/20  0445   WBC 13.1* 11.8* 11.4*   RBC 3.45* 3.31* 3.12*   HEMOGLOBIN 8.7* 8.6* 7.9*   HEMATOCRIT 30.0* 28.4* 26.4*   MCV 87.0 85.8 84.6   MCH 25.2* 26.0* 25.3*   RDW 57.2* 56.7* 54.3*   PLATELETCT 207 234 229   MPV 11.0 11.0 10.9   NEUTSPOLYS 70.20 69.60 67.60   LYMPHOCYTES 16.00* 16.30* 18.00*   MONOCYTES 10.00 10.80 10.20   EOSINOPHILS 2.40 2.10 3.00   BASOPHILS 0.30 0.40 0.40   RBCMORPHOLO Present  --   --      Recent Labs     11/25/20  8247  11/26/20  0719 11/27/20  0445   SODIUM 134* 134* 132*   POTASSIUM 4.3 4.5 4.6   CHLORIDE 95* 95* 94*   CO2 32 33 33   GLUCOSE 113* 133* 124*   BUN 20 21 27*     Recent Labs     11/25/20  0502 11/26/20  0719 11/27/20  0445   ALBUMIN 2.2* 2.1* 2.4*   TBILIRUBIN 0.7 0.8 0.6   ALKPHOSPHAT 100* 81  --    TOTPROTEIN 5.2* 5.2*  --    ALTSGPT 6 <5  --    ASTSGOT 17 13  --    CREATININE 0.77 0.82 0.98       Imaging:  Ct-abdomen-pelvis W/o    Result Date: 11/17/2020 11/17/2020 1:51 PM HISTORY/REASON FOR EXAM:  Nausea/vomiting; elevated WBC; R/O abscess. TECHNIQUE/EXAM DESCRIPTION: CT scan of the abdomen and pelvis without contrast. Noncontrast helical scanning was obtained from the diaphragmatic domes through the pubic symphysis. Low dose optimization technique was utilized for this CT exam including automated exposure control and adjustment of the mA and/or kV according to patient size. COMPARISON: 11/8/2020. FINDINGS: There is a moderate left and small right pleural effusion with overlying atelectasis/consolidation. Trace free air is seen in the abdomen. Evaluation of parenchymal and vascular structures is limited by the lack of intravenous contrast. The liver appears  unremarkable. There are gallstones within the gallbladder. There is hypodensity along the anterior aspect of the spleen measuring approximately 4.3 x 2.9 cm. No adrenal mass is identified. There is right renal atrophy with cortical scarring. There is no  evidence of hydronephrosis. Pancreas appears unremarkable. Aorta is not aneurysmal. There are small inguinal, retroperitoneal and mesenteric lymph nodes. There is no evidence of bowel obstruction. Visualized appendix appears unremarkable. There is a left abdominal stoma. There is parastomal density measuring up to 2.8 x 2.1 cm likely representing a postsurgical hematoma. There is a small amount of free fluid in the abdomen and pelvis. Bladder is mildly distended. There is a surgical drain in the left  abdomen. There is third spacing. Degenerative changes are seen in the spine. Compression deformities of T11 and T12 are again noted. Bones are demineralized.     Postsurgical changes with a left abdominal stoma. Parastomal densities may be related to postsurgical hematoma measuring up to 2.8 x 2.1 cm. Small amount of free fluid in the abdomen and pelvis. Collection along the anterior spleen measures approximately 2.9 x 4.3 cm is decreased in size compared to the prior examination. Evaluation is limited by lack of intravenous contrast. Third spacing. Trace free intraperitoneal air is likely related to recent surgery. Moderate left and small right pleural effusions with overlying atelectasis/consolidation. Cortical scarring of the right kidney. Cholelithiasis.     Ct-abdomen-pelvis With    Result Date: 11/9/2020 11/8/2020 11:34 PM HISTORY/REASON FOR EXAM:  Abdominal infection suspected; Has perisplenic abscess with possible connection to bowel TECHNIQUE/EXAM DESCRIPTION: CT of the abdomen and pelvis with contrast. Contrast-enhanced helical scanning was obtained from the diaphragmatic domes through the pubic symphysis following the bolus administration of 100 mL of nonionic contrast (Omnipaque 350) without complication. Low dose optimization technique was utilized for this CT exam including automated exposure control and adjustment of the mA and/or kV according to patient size. COMPARISON: 11/3/2020 CT FINDINGS: The visualized lung bases show diminished small left pleural fluid that layers dependently. There is similar middle and left lower lobe atelectasis . Similar multichamber cardiac enlargement CT Abdomen: There is a new pigtail catheter in the anterior perisplenic fluid collection. The collection does diminish in size now measuring 58 x 33 from 79 x 40 mm. There is still gas within it and it is in direct contact with the splenic flexure of the colon. A small amount of gas is also seen in the more superficial  soft tissues inferior to the pigtail catheter which crosses the ninth intercostal space No new abscess is detected Contrast is present in the small bowel but not yet in the transverse colon. No contrast extravasation is seen. No abnormal bowel dilatation The pancreas, adrenal glands and kidneys enhance normally. Mild hepatosplenomegaly Some low-density in the gallbladder likely indicating cholelithiasis There is no lymphadenopathy or aneurysmal change of the aorta. Compression deformities in the spine are again seen with subacute fractures resulting in anterior wedging at T11 and T12 There is diffuse fat stranding indicating anasarca CT Pelvis: Visualized pelvic structures are unremarkable. Prostate is within normal limits for age. No lymphadenopathy. There is no free air or free fluid.     Interval decrease in size of perisplenic abscess. Pigtail catheter now in place Abscess has likely fistulous communication with the abutting splenic flexure of the colon. Colon is otherwise normal in appearance Mild hepatosplenomegaly Decreasing small left pleural effusion Subacute anterior wedge compression fractures in the caudal thoracic spine    Ct-abdomen-pelvis With    Addendum Date: 11/5/2020    Addendum: A prior noncontrast CT of the abdomen and pelvis from [facility] dated 10/19/2020 was made available for comparison. On the prior study a perisplenic/subcapsular fluid collection was seen however did not contain air at that time and there was no discrete evidence of communication with the transverse colon. Communication with splenic flexure of the colon and air within the collection are new from the prior study.    Result Date: 11/5/2020  11/3/2020 4:02 PM HISTORY/REASON FOR EXAM:  Abd pain, unspecified; IV contrast only. TECHNIQUE/EXAM DESCRIPTION:   CT scan of the abdomen and pelvis with contrast. Contrast-enhanced helical scanning was obtained from the diaphragmatic domes through the pubic symphysis  following the bolus administration of nonionic contrast without complication. 100 mL of Omnipaque 350 nonionic contrast was administered without complication. Low dose optimization technique was utilized for this CT exam including automated exposure control and adjustment of the mA and/or kV according to patient size. COMPARISON: 9/26/2018 FINDINGS: Chest Base: Small left pleural effusion with mild bibasilar atelectasis. Heart is mildly enlarged. Liver:  Diffuse hypoattenuation of the liver suggesting fatty infiltration. Moderately enlarged. Gallbladder: Cholelithiasis. Biliary tract: Nondilated. Pancreas: Normal. Spleen: There is a rim-enhancing perisplenic fluid collection which contains air and which communicates with the splenic flexure of the colon. This most likely represents an abscess and measures about 8.7 x 7.9 cm. The anterior aspect of the spleen is nonenhancing with irregular margins could be related to infection of splenic parenchyma or infarction. Adrenals: Normal. Kidneys and Collecting Systems:  There is mild right renal cortical scarring. No hydronephrosis. No renal mass. Gastrointestinal tract:  No bowel obstruction. The appendix is normal. Peritoneum: No free air or free fluid. Reproductive organs:  Normal. Bladder:  Normal. Vessels:  Normal caliber. Lymph  Nodes:  No lymphadenopathy. Abdominal wall: Within normal limits. Bones:  Age indeterminate possibly subacute to chronic T11 and T12 superior endplate compression fractures.     1.  Peripherally enhancing perisplenic fluid collection which communicates with the splenic flexure of the colon and which contains foci of air is most consistent with an abscess/infected fluid collection. There is nonenhancement of the anterior aspect of the spleen suggesting infection/infarction. Surgical consultation is recommended. 2.  Hepatomegaly and hepatic steatosis. 3.  Cholelithiasis. 4.  Right renal cortical scarring. No hydronephrosis. 5.  Small left  pleural effusion. Mild bibasilar atelectasis. 6.  Subacute to chronic appearing T11 and T12 superior endplate compression fractures. These findings were discussed with SVETLANA PAREDES on 11/3/2020 4:49 PM.     Ct-drain-peritoneal    Result Date: 11/4/2020 11/4/2020 5:19 PM HISTORY/REASON FOR EXAM:  Joya Splenic Fluid Collection TECHNIQUE/EXAM DESCRIPTION: LUQ parasplenic abscess drainage with CT guidance. Low dose optimization technique was utilized for this CT exam including automated exposure control and adjustment of the mA and/or kV according to patient size. PROCEDURE: Informed consent was obtained. SEDATION: Moderate sedation was provided. Pulse oximetry and continuous cardiac monitoring by the nurse was performed throughout the exam. Intraservice time was 30 minutes. Localizing CT images were obtained with the patient in supine position. The skin was prepped with Betadine and draped in a sterile fashion. Following local anesthesia with 1% Lidocaine, a 17 G guiding needle was placed and needle path confirmed with CT. An Amplatz guidewire was placed and following serial tract dilatation, a 10 Georgian pigtail locking catheter was placed. A specimen was collected and submitted for culture and sensitivity and Gram stain. Total of 6 mL of Brownish fluid was drained. The catheter was secured to the skin and connected to suction bulb drainage. Final CT images were obtained documenting catheter position. The patient tolerated the procedure well with no evidence of complication. COMPARISON: CT scan abdomen and pelvis 11/3/2020 FINDINGS: The final CT images show satisfactory catheter position within the target collection.     1.  CT guided perisplenic abscess catheter drainage. 2.  The current plan is to obtain a follow-up CT in 5-7 days..    [id number] View    Result Date: 11/20/2020 11/20/2020 2:00 PM HISTORY/REASON FOR EXAM:  Abdominal Pain. TECHNIQUE/EXAM DESCRIPTION AND NUMBER OF VIEWS:  1 view(s) of the  abdomen. COMPARISON: None FINDINGS: No evidence for small bowel obstruction. Surgical clips present in the mid abdomen. LEFT mid abdominal stoma noted. Surgical drain present in the LEFT lateral abdomen. Catheter projects of the RIGHT midabdomen, possibly wound VAC. Degenerative change of lumbar spine.     1.  No evidence of bowel obstruction. 2.  Postoperative changes as described.    Dx-chest-portable (1 View)    Result Date: 11/17/2020 11/17/2020 9:19 AM HISTORY/REASON FOR EXAM: Elevated white blood cell count. TECHNIQUE/EXAM DESCRIPTION AND NUMBER OF VIEWS: Single portable view of the chest. COMPARISON: 11/10/2020 FINDINGS: There are bilateral interstitial and airspace infiltrates. There is small bilateral pleural effusion. The heart is enlarged. Right subclavian central line is again seen.     1.  Bilateral airspace and interstitial infiltrates. 2.  Cardiomegaly.    Dx-chest-portable (1 View)    Result Date: 11/10/2020  11/10/2020 12:29 PM HISTORY/REASON FOR EXAM: new central line. TECHNIQUE/EXAM DESCRIPTION AND NUMBER OF VIEWS: Single AP view of the chest. COMPARISON: November 3 FINDINGS: Hardware: ] Tip overlies the cavoatrial junction Lungs: Linear middle lobe and left basilar opacity is seen. Volumes are low Pleura:  No pneumothorax is identified Heart and mediastinum: There is stable cardiac silhouette enlargement.     No pneumothorax identified following right subclavian line placement Worsening atelectasis with consolidation at the bases not excluded Stable cardiac silhouette enlargement    Dx-chest-portable (1 View)    Result Date: 11/3/2020  11/3/2020 3:41 PM HISTORY/REASON FOR EXAM:  Chest Pain. TECHNIQUE/EXAM DESCRIPTION AND NUMBER OF VIEWS: Single portable view of the chest. COMPARISON: September 10, 2020 FINDINGS: The cardiac size is enlarged. There is interstitial prominence. Lung aeration has improved. No pneumothorax. No obvious pleural effusion.     Slight interval interval improvement in  congestive heart failure.    Us-renal    Result Date: 11/11/2020 11/11/2020 6:21 PM HISTORY/REASON FOR EXAM:  Abnormal Labs TECHNIQUE/EXAM DESCRIPTION: Renal ultrasound. COMPARISON:  None FINDINGS: The right kidney measures 9.39 cm. The left kidney measures 9.54 cm. There is no hydronephrosis. There are no abnormal calcifications. The bladder not fully the time of imaging.      Examination was limited due to poor sound penetration related to body habitus. Grossly normal appearance of the kidneys.    Ec-echocardiogram Complete W/ Cont    Result Date: 11/11/2020  Transthoracic Echo Report Echocardiography Laboratory CONCLUSIONS Normal left ventricular chamber size. Moderately reduced left ventricular systolic function. Left ventricular ejection fraction is visually estimated to be 30-40%; variable related to atrial fibrillation. Mild mitral regurgitation. Moderately dilated right ventricle. Reduced right ventricular systolic function. Right heart pressures are normal. No prior study is available for comparison. LV EF:        % FINDINGS Left Ventricle 3 mL of contrast was administered. Contrast was used to enhance visualization of the endocardial border. Existing IV was used. Mild concentric left ventricular hypertrophy. Normal left ventricular chamber size.  Moderately reduced left ventricular systolic function. Left ventricular ejection fraction is visually estimated to be 30-40%. Diastolic function is difficult to assess with atrial fibrillation. Right Ventricle Moderately dilated right ventricle. Reduced right ventricular systolic function. Right Atrium Enlarged right atrium. Normal inferior vena cava size and inspiratory collapse. Left Atrium Normal left atrial size. Mitral Valve Structurally normal mitral valve. Mild mitral regurgitation. No mitral stenosis. Aortic Valve Tricuspid aortic valve. Structurally normal aortic valve. No stenosis or regurgitation seen. Tricuspid Valve Structurally normal tricuspid  valve. Mild tricuspid regurgitation. Right atrial pressure is estimated to be 3 mmHg. Estimated right ventricular systolic pressure  is 25 mmHg. No tricuspid stenosis. Right heart pressures are normal. Pulmonic Valve Structurally normal pulmonic valve without significant stenosis or regurgitation. Pericardium Normal pericardium without effusion. Aorta The aortic root is normal.  Ascending aorta diameter is 3.6 cm. Germain Pacheco M.D. (Electronically Signed) Final Date:     11 November 2020                 12:38 Amended:        11 November 2020 12:40    CT scan 11/24  CT Abdomen:  The liver, adrenal glands, and pancreas are unremarkable.     There are multiple nitrogen-containing calculi in the gallbladder.     Hypodense collection in the anterior spleen has increased in size to approximately 5.8 x 4.6 cm, previously 4.2 x 2.9 cm. There is stranding in the adjacent fat.     The kidneys enhance symmetrically. There is right renal scarring.     Left abdominal colostomy is again noted with small adjacent hematomas in the subcutaneous fat. There is residual contrast in the distal colon. No contrast extravasation or pneumoperitoneum is appreciated.     There are calcifications in the arteries.     Micro:  Results     Procedure Component Value Units Date/Time    CULTURE WOUND W/ GRAM STAIN [[id number]]  (Abnormal) Collected: 11/25/20 1652    Order Status: Completed Specimen: Wound Updated: 11/27/20 1217     Significant Indicator POS     Source WND     Site Joya-splenic drain     Culture Result Growth noted after further incubation, see below for  organism identification.       Gram Stain Result Moderate WBCs.  Rare Gram positive cocci.       Culture Result Group D Enterococcus species  Rare growth      Narrative:      Collected By:[id number] ERLIN SPARROW  Joya-splenic collection.  Collected By:[id number] ERLIN SPARROW    GRAM STAIN [[id number]] Collected: 11/25/20 1652    Order Status: Completed Specimen: Wound  Updated: 11/26/20 0944     Significant Indicator .     Source WND     Site Joya-splenic drain     Gram Stain Result Moderate WBCs.  Rare Gram positive cocci.      Narrative:      Collected By:[id number] ERLIN SPARROW  Joya-splenic collection.  Collected By:[id number] ERLIN SPARROW    FLUID CULTURE W/GRAM STAIN [[id number]] Collected: 11/25/20 1705    Order Status: Sent Specimen: Body Fluid from Peritoneal Fluid     FLUID CULTURE W/GRAM STAIN [[id number]] Collected: 11/25/20 1625    Order Status: Canceled Specimen: Other from Peritoneal Fluid           Assessment:  Active Hospital Problems    Diagnosis   • *Spontaneous perforation of colon (HCC) [K63.1]   • Leucocytosis [D72.829]   • Schizophrenia (HCC) [F20.9]   • Sepsis (HCC) [A41.9]   • Elevated digoxin level [R78.89]   • SRUTHI (acute kidney injury) ([facility]) [N17.9]   • Adrenal insufficiency ([facility]) [E27.40]   • LV dysfunction [I51.9]   • Paroxysmal A-fib with RVR ([facility]) [I48.0]   • Type 2 diabetes mellitus, without long-term current use of insulin, with peripheral neuropathy ([facility]) [E11.9]   • Hypothyroidism [E03.9]   • Prolonged QT interval [R94.31]     Hospital Course:   CT scan on 11/3 revealed a peripherally enhancing perisplenic fluid collection measuring 8.7 x 7.9 cm, communicating with the splenic flexure of the colon and contained foci of air consistent with abscess/infected fluid.  Also noted nonenhancing of the anterior aspect of the spleen suggesting infection/infarction.    Status post surgery on 11/10/2020    Leukocytosis -persistent. Decreased     Acute kidney injury- improved   Renal ultrasound-unremarkable  Nephrology following  Avoid nephrotoxins       Perisplenic abscess with possible communication to the bowel  Left upper quadrant abscess  s/p ground-level fall complicated by a subcapsular splenic hematoma  Afebrile  Splenic enhancement on CT, suggesting infection or infarction  -s/p drainage 11/4 and peritoneal fluid Efaecalis, ampicillin sensitive and C  albicans  S/p segmental colectomy, colostomy, irrigation and debridement of flank wound and wound VAC placement on 11/10/2020 by Dr. Desouza.  Patient found to have a left upper quadrant abscess inferior to the spleen with a large bowel wall defect.  Also noted to have an abdominal wall abscess.  Op cultures (colon perforation) - E faecalis and Candida albicans.  Gram stain+ GPC, GPR  -CT abdomen pelvis without on 11/17-hypodensity along anterior aspect of the spleen measuring 4.3 x 2.9 cm.  Parastomal density measuring two-point by 2.1 cm likely representing postsurgical hematoma.  This is decreased in size from prior.  Small amount of free fluid in the abdomen pelvis.  -Drain removed on 11/21  -CT 11/24 with enlarging anterior splenic fluid collection  Deemed nonsurical-Surgery recommending IR drainage. Please send fluif for culture  -11/25 new drain placed     Continue vancomycin to treat the Enterococcus (PCN allergic) for now-(prior Efaecalis)  Continue ceftriaxone to provide gram-negative coverage.    Continue fluconazole 400 mg daily  for the Candida  Anticipate 10 day course from date of last surgery/source control     Diabetes mellitus  Keep BS under 150 to help control current infection  -164        Antibiotic allergies: Daptomycin rash, penicillins rash and hives, tolerates cephalosporins-discussed allergies and he states that he has developed significant rash and hives with penicillins several times, most recently a year and a half ago- will avoid penicillins

## 2020-11-27 NOTE — PROGRESS NOTES
Daily Progress Note:     Date of Service: 11/27/2020  Primary Team: [facility] IM Yellow Team   Attending: JONATHAN Myrick M.D.   Senior Resident: Dr. Ortega (Dr. Callaway 11/27)  Intern: Dr. Ortega  Contact:  [phone]    Chief Complaint:   Abdominal pain     Subjective  No acute events overnight  Patient denies any new pain, and has some general abdominal soreness but not worsening  Mood is stable, denies active hallucinations/voices    Leukocytosis improved a bit 11.4 today, 11.8 yesterday      Consultants/Specialty:  ID  IR  Gen surgery    Review of Systems  Review of Systems   Constitutional: no chills and fever and malaise/fatigue.   HENT: Negative for sore throat.    Respiratory: Negative for cough, hemoptysis, sputum production and shortness of breath.    Cardiovascular: Negative for chest pain, palpitations, claudication and leg swelling.   Gastrointestinal: Negative for abdominal pain, nausea and vomiting.   Genitourinary: Negative for dysuria.   Musculoskeletal: Negative for myalgias.   Neurological: Positive for weakness. Negative for dizziness, focal weakness and seizures.   Psychiatric/Behavioral: Negative for memory loss. The patient is not nervous/anxious and does not have insomnia.           Objective Data:   Physical Exam:   Vitals:   Temp:  [35.9 °C (96.7 °F)-36.9 °C (98.4 °F)] 36.4 °C (97.5 °F)  Pulse:  [] 98  Resp:  [18] 18  BP: ()/(58-72) 94/62  SpO2:  [92 %-97 %] 97 %     Physical Exam  Constitutional:       General: He is not in acute distress     Appearance: Normal appearance. He is obese. He is not ill-appearing, toxic-appearing or diaphoretic.   HENT:      Head: Normocephalic.   Eyes:      General: No scleral icterus.     Pupils: Pupils are equal, round, and reactive to light.   Cardiovascular:      Rate and Rhythm: irregularly irregular.      Pulses: Normal pulses.      Heart sounds: Murmur present.   Pulmonary:      Effort: Pulmonary effort is normal. No respiratory distress.       Breath sounds: Normal breath sounds.   Abdominal:      General: Abdomen is flat. Bowel sounds are normal.      Palpations: Abdomen is soft. There is a BETTE drain with serosanguinous fluid with tinge of  Purulent fluid.   Wound vac intact without erythema.     Musculoskeletal:      Right lower leg: No edema.      Left lower leg: No edema.   Neurological:      General: No focal deficit present.      Mental Status: He is alert and oriented to person, place, and time. Mental status is at baseline.      Cranial Nerves: No cranial nerve deficit.   Psychiatric:         Mood and Affect: Mood normal.         Behavior: Behavior normal.         Thought Content: Thought content normal.         Judgment: Judgment normal.              Labs:   WBC 11.8 from 13.1  Hgb 8.6     Na 134  Crea 0.82    Alb 2.1  T bili 0.8    Glucose 108     Microbiology:  Purulent fluid drainage: gram stain showing positive cocci on 11/25       Imaging:   No new imaging    Problem Representation    Mr. Castro is a 56 y/o M w/ hx of schizophrenia with positive symptom of AVH initially on home sertaline, olanzapine, T2DM hgbA1c 7.8 on 11/10/2020, HFrEF of 30% on 11/2020  from ischemic and hypertensive cardiomyopathy on spirnolactone, furosemide and metoprolol initially was seen by ED for perispleenic hematoma was discharged and was admitted for persistent left abdominal pain found to have vj-spleenic abscess with bowel perforation s/p left spelinic flexure colectomy on colostomy bag with albicans candidemia and E. Faecalis sepsis. Pt  developed afib with RVR s/p surgery and was started on digoxin but was supratherapeutic likely d/t hemodynamic and septic SRUTHI; digoxin level improved, afib  on metoprolol, Pt also developed prolonged QT  Secondary to polypharmacy from SSRI, linezolid and anti-emetics.     Leukocytosis continues to trend down    Patient will need LTAC    Currently IR drainage of vj-splenic abscess uncomplicated, no splenic bleeding,  hemostatics stable. Pending culture pleural fluid bug identification and speciation. Pending ID recs if needing imaging for re-evaluation of source control.            #[place] and E faecalis sepsis  #perisplenic Albicans and E. Faeclis abscess w/ adjust splenic flexure perforation  #s/p segmental colectomy on colostomy bag , Patti pouch,  #s/p I & D of abdominal wall abscess s/p drain pulled out that was draining abscess  #hypoalbuminemia/ caloric protein malnutrition     Latest abscess size bigger at 5.8 x 4.6 cm than previous 2.8 x 2.1 cm s/p IR drainage, leukocytosis downtrended. No vasoplegia hence continuing anti-hypertensives.     Plan  - follow up perisplenic fluid drainage culture   - continue to monitor WBC ct       - continue Fluconazole(11/14)  - continue vancomycin (11/17  - continue ceftriaxone  11/17  - continue enteral nutrition.       -appreciate ID recs  -appreciate gen surgery recs           #S/p surgical wound care for colostomy, segmental left splenic flexure colectomy.  #protein caloric malnutrition/hypoalbuminemia  #T2DM hgbA1c 7.8 on 11/2020 w/o CKD, but with small nerve and large nerve neuropathy---   #postural orthostatic hypotension likely 2/2 small nerve T2DM neuropathy vs physical decompensation      Plan  -continue to monitor Phos, Mg  Level for refeeding syndrome.       -Continue PO enteral feeds  -continue basal lantus 10u qdaily  -continue ISS w/ hypoglycemia protocol w/ target of 140-180 glucose   -holding off metformin  -consulting nutrition if needed       #Paroxysmal A-fib with RVR (HCC)- (present on admission)  #supratherapuetic digoxin -- resolved.     Stable now, last night tachycardic afib 2/2 uncontrolled pain.    Not on ACC 2/2 fall risk 2/2 postural orthostatic intolerance and hypotension   CHADSVASC of 3; HASBLED 2 but pt is fall risk.         Plan  -continue to monitor Mg >2, Phos >3 and K >4, replete prn    - continue metop to 75 mg BD  - continue amiodarone 200 mg  daily        #Schizophrenia (HCC) with positive sign  #SI w/o plan --unresolved.  #prolong QTc 2/2 polypharmacy     Stable. No AVH overnight     Plan  - continue to monitor QTc  - continue to monitor CBC     - continue risperidone dose 0.5mg qAM and 1.0 mg qnightly    -continue modafinil  - continue trazadone           #HFrEF of 30% NYHA III-IV, Stage C  #ischemic and hypertensive cardiomyopahty   #b/l pleural effusion 2/2 surgical volume maintenance.   Stable.      Plan  -continue  metoprolol 75 mg BID  -continue lisinopril 20 mg qdaily,           #Subclinical Hypothyroidism  #Hypoalbuminemia/protein-caloric malnutrition  #Sepsis   #paroxysmal Afib w/ RVR   #normocytic anemia --likely from Anemia of inflammation, chronic disease.      Stable . Cause of hypothyroidism is multifactorial but willl treat  TSH 16.0 (prior was 8.0) and T4 1.14     Plan  - continue on Levothyroxine 112mcg daily.  - continue iron supplement  - continue metoclopramide and prochlorpazine for nausea/vomiting      #DJD  Stable     Plan  -continue PRN noroc 10/325 q6hrs prn         #hemodynamic SRUTHI (acute kidney injury) ([facility])  Assessment & Plan  Resolved     T/L/D  1x PIV, 1 left BETTE drain  1 surgical wound vac at [facility]   On Famotiidne for GI prophjylaxis   DVT: SCDs  Diabetic diet   Abx: vancomycin, ceftrioxone, fluconozaole.   Dispo: Will need long-term acute care, can likely transfer to medical floor while this is arranged

## 2020-11-27 NOTE — WOUND TEAM
[facility]  Inpatient Services  Wound and Skin Care Evaluation    Admission Date: 11/3/2020     Last order of IP CONSULT TO WOUND CARE was found on 11/16/2020 from Hospital Encounter on 11/3/2020     HPI, PMH, SH: Reviewed    Unit where seen by Wound Team: [id number]     WOUND CONSULT/FOLLOW UP RELATED TO:   Scheduled abdominal NPWT dressing/Ostomy appliance change    Self Report / Pain Level:  Some tenderness, premedicated with po pain medication by nursing. Tolerated well.        OBJECTIVE:  Vac dressing intact. Other abdominal dressings intact.    WOUND TYPE, LOCATION, CHARACTERISTICS (Pressure Injuries: location, stage, POA or date identified)    Negative Pressure Wound Therapy 11/10/20 Surgical Abdomen Midline (Active)   Vacuum Serial Number [id number]    NPWT Pump Mode / Pressure Setting Continuous;125 mmHg    Dressing Type Medium;Black Foam (Regular)    Number of Foam Pieces Used 4    Canister Changed No    NEXT Dressing Change/Treatment Date 11/30/20            Wound 11/10/20 Full Thickness Wound Abdomen Midline wound vac (Active)   Wound Image   11/23/20 1016   Site Assessment Red;Yellow    Periwound Assessment Denuded    Margins Attached edges    Closure Secondary intention    Drainage Amount Scant    Drainage Description Serosanguineous    Treatments Site care    Wound Cleansing Approved Wound Cleanser    Periwound Protectant Hydrocolloid;Benzoin;Skin Protectant Wipes to Periwound;Stoma Powder    Dressing Cleansing/Solutions Not Applicable    Dressing Options Wound Vac    Dressing Changed Changed    Dressing Status Intact    Dressing Change/Treatment Frequency Monday, Wednesday, Friday, and As Needed    NEXT Dressing Change/Treatment Date 11/30/20    NEXT Weekly Photo (Inpatient Only) 11/30/20    Number of Staples Removed 10    Non-staged Wound Description Full thickness    Wound Length (cm) 13 cm    Wound Width (cm) 6.5 cm    Wound Depth (cm) 3.5 cm    Wound Surface Area (cm^2) 84.5 cm^2       Wound Volume (cm^3) 295.75 cm^3    Wound Healing % 61    Wound Bed Granulation (%) 80 %    Tunneling (cm) 0 cm    Undermining (cm) 0 cm    Shape oval    Wound Odor None    Exposed Structures Adipose     Inferior wound: 3 x 2.5 x 2        Wound 11/10/20 Full Thickness Wound Flank Left (Active)   Wound Image   11/27/20 1300   Site Assessment Clean;Red    Periwound Assessment Intact    Margins Attached edges    Closure Secondary intention    Drainage Amount Scant    Drainage Description Serosanguineous    Treatments Site care    Wound Cleansing Approved Wound Cleanser    Periwound Protectant Skin Protectant Wipes to Periwound    Dressing Cleansing/Solutions Not Applicable    Dressing Options Plain Strip Packing    Dressing Changed Changed    Dressing Status Intact    Dressing Change/Treatment Frequency Every Shift, and As Needed    NEXT Dressing Change/Treatment Date 11/27/20    NEXT Weekly Photo (Inpatient Only) 12/04/20    Non-staged Wound Description Full thickness    Wound Length (cm) 0.6 cm    Wound Width (cm) 0.6 cm    Wound Depth (cm) 2 cm    Wound Surface Area (cm^2) 0.36 cm^2    Wound Volume (cm^3) 0.72 cm^3    Wound Healing % 4    Tunneling (cm) 0 cm    Shape 0    Exposed Structures VON            Lab Values:    Lab Results   Component Value Date/Time    WBC 11.4 (H) 11/27/2020 04:45 AM    RBC 3.12 (L) 11/27/2020 04:45 AM    HEMOGLOBIN 7.9 (L) 11/27/2020 04:45 AM    HEMATOCRIT 26.4 (L) 11/27/2020 04:45 AM    CREACTPROT 0.54 10/23/2018 08:02 AM    SEDRATEWES 13 10/23/2018 08:02 AM    HBA1C 7.8 (H) 11/10/2020 03:31 AM        Culture Results show:  Recent Results (from the past 720 hour(s))   CULTURE WOUND W/ GRAM STAIN    Collection Time: 11/10/20 10:39 AM    Specimen: Wound   Result Value Ref Range    Significant Indicator POS (POS)     Source WND     Site COLON PERFORATION     Culture Result - (A)     Gram Stain Result       Rare WBCs.  Rare Gram positive cocci.  Rare Gram positive rods.      Culture Result  Enterococcus faecalis  Light growth   (A)     Culture Result Candida albicans  Light growth   (A)        Susceptibility    Enterococcus faecalis - ISAMAR     Daptomycin 4 Sensitive mcg/mL     Ampicillin <=2 Sensitive mcg/mL     Gent Synergy <=500 Sensitive mcg/mL     Vancomycin 1 Sensitive mcg/mL     Penicillin 8 Sensitive mcg/mL       INTERVENTIONS BY WOUND TEAM:   Vac dressing removed, no retained foam noted. Wound and vj wound cleansed with NS and gauze. Vj wound prepped with no sting barrier, the crusted using ostomy powder.Hydrocollotd thin applied to to denuded parts and secured with drape. Mepitel also applied to staples above superior wound and umbilicus. Small piece of gauze applied over umbilicus 2 pc of foam applied to superior wound bed. 1 pc of foam applied to inferior wound and bridged to superior portion. 4th pc used as button for tracpad. Drape and tracpad to foam. NPWT resumed at 125 mm Hg continuous, no leaks noted.     L flank wound dressing removed. Wound and periwound cleansed with wound cleanser and gauze. Pictures and measurements obtained. Dressed with plain strip packing as there was no silver packing in room. Secured with adhesive foam.    Interdisciplinary consultation: Patient, Bedside RN    EVALUATION / RATIONALE FOR TREATMENT: brown adipose to wound bed. Periwound with denuded portions secondary to the drape. Pt will benefit from ongoing crusting or Hydrocolloid to prevent further breakdown.    Goals: Steady decrease in wound area and depth weekly.    NURSING PLAN OF CARE ORDERS (X):    Dressing changes: See Dressing Care orders: X  Skin care: See Skin Care orders: X  RN Prevention Protocol: X  Rectal tube care: See Rectal Tube Care orders:   Other orders:      WOUND TEAM PLAN OF CARE:   Dressing changes by wound team:                   Follow up 3 times weekly:                NPWT change 3 times weekly:  X - abdomen   Follow up 1-2 times weekly:    X -left flank   Follow up  "Bi-Monthly:                   Follow up as needed:       Other (explain):     Anticipated discharge plans:  LTACH:        SNF/Rehab:     X            Home Health Care:           Outpatient Wound Center:            Self Care:                [facility]  Inpatient Services  New Ostomy Management & Teaching     HPI:  Reviewed  PMH: Reviewed   SH: Reviewed        Objective: Appliance leaking. Stool under drape but not in wound    Colostomy 11/10/20 Transverse LUQ (Active)   Wound Image   11/11/20 1135   Stomal Appliance Assessment Irrigation -   Stoma Assessment Beefy red    Stoma Shape Round    Stoma Size (in) 2    Peristomal Assessment Red    Mucocutaneous Junction Intact    Treatment Appliance Changed    Peristomal Protectant Paste Ring;No Sting Skin Prep;Stoma Powder    Stomal Appliance Paste Ring, 2\";2 3/4\" (70mm) CTF    Output (mL) 60 mL    Output Color Brown    WOUND RN ONLY - Stomal Appliance  2 Piece;Paste Ring, 2\";4\" (102mm) CTF;2 3/4\" (70mm) CTF    Appliance (Pouch) # [id number]    Appliance Brand [employer]    Appliance Supplier [email or web address]    Secure Start completed Yes    Ostomy Care Resources Provided [facility] Tip Sheet                    Ostomy Appliance (type and size):  2 piece 2 3/4\" with paste ring, consider using convex barrier due to leaking.     Interventions:  Previous appliance removed, cleansed skin with warm wash cloth, dried. New template made. Ostomy RN cut barrier to fit, trimming edge closest to abdominal wound. Peristoma crusted x2. Pt applied paste ring  to barrier ans ostomy RN applied barrier to skin. Used friction to adhered [place] to skin. Pouch end opened and closed by pt. Ostomy RN connected pouch to barrier.                 Pt education: Pt was able to perform some hands-on care today. Will require ongoing assistance     Evaluation:  Peristomal skin mildly irritated. Will need ongoing crusting. Pt unable to perform most ostomy care alone. Patient likely to discharge to SNF or Rehab. " Stoma is viable and producing adequate amounts of stool.     Plan: Ostomy nurses to continue to follow for ostomy needs and teaching      Anticipated discharge needs: Supplies, supplier information, possible HH or outpatient ostomy clinic

## 2020-11-27 NOTE — PROGRESS NOTES
Report received, pt is alert and pleasant, abx infusing, dressings clean dry and intact, plan for dressing change this AM. Pt repositioned for comfort, discussed POC, call bell and personal items within reach, fall precautions in place, will continue to monitor.

## 2020-11-27 NOTE — CARE PLAN
Problem: Nutritional:  Goal: Achieve adequate nutritional intake  Description: Advance diet as tolerated past clear liquids. Patient will consume >50% of meals.  Outcome: PROGRESSING AS EXPECTED   See RD note.

## 2020-11-27 NOTE — DISCHARGE PLANNING
Received Choice form at [phone]  Agency/Facility Name: [facility]   Referral sent per Choice form @ [phone]

## 2020-11-27 NOTE — PROGRESS NOTES
Assumed care of patient, bedside report received from ELISA Saleh. Updated on POC, call light within reach and fall precautions in place. Bed locked and in lowest position. Patient instructed to call for assistance before getting out of bed. All questions answered, no other needs at this time.

## 2020-11-28 PROBLEM — I50.9 HEART FAILURE (HCC): Status: ACTIVE | Noted: 2020-11-08

## 2020-11-28 LAB
ALBUMIN SERPL BCP-MCNC: 2.2 G/DL (ref 3.2–4.9)
ALBUMIN/GLOB SERPL: 0.7 G/DL
ALP SERPL-CCNC: 117 U/L (ref 30–99)
ALT SERPL-CCNC: 7 U/L (ref 2–50)
ANION GAP SERPL CALC-SCNC: 6 MMOL/L (ref 7–16)
AST SERPL-CCNC: 16 U/L (ref 12–45)
BACTERIA WND AEROBE CULT: ABNORMAL
BACTERIA WND AEROBE CULT: ABNORMAL
BILIRUB SERPL-MCNC: 0.4 MG/DL (ref 0.1–1.5)
BUN SERPL-MCNC: 28 MG/DL (ref 8–22)
CALCIUM SERPL-MCNC: 8.4 MG/DL (ref 8.5–10.5)
CHLORIDE SERPL-SCNC: 96 MMOL/L (ref 96–112)
CO2 SERPL-SCNC: 32 MMOL/L (ref 20–33)
CREAT SERPL-MCNC: 0.97 MG/DL (ref 0.5–1.4)
GLOBULIN SER CALC-MCNC: 3 G/DL (ref 1.9–3.5)
GLUCOSE BLD-MCNC: 107 MG/DL (ref 65–99)
GLUCOSE BLD-MCNC: 119 MG/DL (ref 65–99)
GLUCOSE BLD-MCNC: 128 MG/DL (ref 65–99)
GLUCOSE BLD-MCNC: 128 MG/DL (ref 65–99)
GLUCOSE BLD-MCNC: 87 MG/DL (ref 65–99)
GLUCOSE BLD-MCNC: 98 MG/DL (ref 65–99)
GLUCOSE SERPL-MCNC: 140 MG/DL (ref 65–99)
GRAM STN SPEC: ABNORMAL
POTASSIUM SERPL-SCNC: 4.7 MMOL/L (ref 3.6–5.5)
PROT SERPL-MCNC: 5.2 G/DL (ref 6–8.2)
SIGNIFICANT IND 70042: ABNORMAL
SITE SITE: ABNORMAL
SODIUM SERPL-SCNC: 134 MMOL/L (ref 135–145)
SOURCE SOURCE: ABNORMAL

## 2020-11-28 PROCEDURE — 700102 HCHG RX REV CODE 250 W/ 637 OVERRIDE(OP): Performed by: STUDENT IN AN ORGANIZED HEALTH CARE EDUCATION/TRAINING PROGRAM

## 2020-11-28 PROCEDURE — 770001 HCHG ROOM/CARE - MED/SURG/GYN PRIV*

## 2020-11-28 PROCEDURE — A9270 NON-COVERED ITEM OR SERVICE: HCPCS | Performed by: STUDENT IN AN ORGANIZED HEALTH CARE EDUCATION/TRAINING PROGRAM

## 2020-11-28 PROCEDURE — 99232 SBSQ HOSP IP/OBS MODERATE 35: CPT | Performed by: INTERNAL MEDICINE

## 2020-11-28 PROCEDURE — 700105 HCHG RX REV CODE 258: Performed by: INTERNAL MEDICINE

## 2020-11-28 PROCEDURE — A9270 NON-COVERED ITEM OR SERVICE: HCPCS | Performed by: INTERNAL MEDICINE

## 2020-11-28 PROCEDURE — 700111 HCHG RX REV CODE 636 W/ 250 OVERRIDE (IP): Performed by: INTERNAL MEDICINE

## 2020-11-28 PROCEDURE — 80053 COMPREHEN METABOLIC PANEL: CPT

## 2020-11-28 PROCEDURE — 700102 HCHG RX REV CODE 250 W/ 637 OVERRIDE(OP): Performed by: INTERNAL MEDICINE

## 2020-11-28 PROCEDURE — 82962 GLUCOSE BLOOD TEST: CPT | Mod: 91

## 2020-11-28 PROCEDURE — A9270 NON-COVERED ITEM OR SERVICE: HCPCS | Performed by: PSYCHIATRY & NEUROLOGY

## 2020-11-28 PROCEDURE — 700102 HCHG RX REV CODE 250 W/ 637 OVERRIDE(OP): Performed by: PSYCHIATRY & NEUROLOGY

## 2020-11-28 PROCEDURE — 700102 HCHG RX REV CODE 250 W/ 637 OVERRIDE(OP): Performed by: SURGERY

## 2020-11-28 RX ORDER — ACETAMINOPHEN 325 MG/1
650 TABLET ORAL EVERY 6 HOURS PRN
Status: DISCONTINUED | OUTPATIENT
Start: 2020-11-28 | End: 2020-12-17 | Stop reason: HOSPADM

## 2020-11-28 RX ORDER — FUROSEMIDE 10 MG/ML
20 INJECTION INTRAMUSCULAR; INTRAVENOUS
Status: DISCONTINUED | OUTPATIENT
Start: 2020-11-28 | End: 2020-11-29

## 2020-11-28 RX ADMIN — RISPERIDONE 1 MG: 1 TABLET ORAL at 17:02

## 2020-11-28 RX ADMIN — FLUCONAZOLE 400 MG: 200 TABLET ORAL at 06:11

## 2020-11-28 RX ADMIN — HYDROCODONE BITARTRATE AND ACETAMINOPHEN 1 TABLET: 10; 325 TABLET ORAL at 21:10

## 2020-11-28 RX ADMIN — Medication 400 MG: at 06:11

## 2020-11-28 RX ADMIN — HYDROCODONE BITARTRATE AND ACETAMINOPHEN 1 TABLET: 10; 325 TABLET ORAL at 03:49

## 2020-11-28 RX ADMIN — LISINOPRIL 20 MG: 20 TABLET ORAL at 06:11

## 2020-11-28 RX ADMIN — METOPROLOL SUCCINATE 75 MG: 50 TABLET, EXTENDED RELEASE ORAL at 06:11

## 2020-11-28 RX ADMIN — MODAFINIL 100 MG: 100 TABLET ORAL at 06:08

## 2020-11-28 RX ADMIN — AMIODARONE HYDROCHLORIDE 200 MG: 200 TABLET ORAL at 06:08

## 2020-11-28 RX ADMIN — VANCOMYCIN HYDROCHLORIDE 1250 MG: 500 INJECTION, POWDER, LYOPHILIZED, FOR SOLUTION INTRAVENOUS at 08:12

## 2020-11-28 RX ADMIN — INSULIN GLARGINE 10 UNITS: 100 INJECTION, SOLUTION SUBCUTANEOUS at 06:09

## 2020-11-28 RX ADMIN — Medication 400 MG: at 17:07

## 2020-11-28 RX ADMIN — CEFTRIAXONE SODIUM 2 G: 2 INJECTION, POWDER, FOR SOLUTION INTRAMUSCULAR; INTRAVENOUS at 05:43

## 2020-11-28 RX ADMIN — TRAZODONE HYDROCHLORIDE 50 MG: 50 TABLET ORAL at 21:04

## 2020-11-28 RX ADMIN — SPIRONOLACTONE 25 MG: 50 TABLET ORAL at 06:09

## 2020-11-28 RX ADMIN — FUROSEMIDE 20 MG: 10 INJECTION, SOLUTION INTRAMUSCULAR; INTRAVENOUS at 11:49

## 2020-11-28 RX ADMIN — LEVOTHYROXINE SODIUM 112 MCG: 112 TABLET ORAL at 06:11

## 2020-11-28 RX ADMIN — RISPERIDONE 0.5 MG: 1 TABLET ORAL at 06:11

## 2020-11-28 RX ADMIN — ALTEPLASE 2 MG: 2.2 INJECTION, POWDER, LYOPHILIZED, FOR SOLUTION INTRAVENOUS at 17:02

## 2020-11-28 RX ADMIN — METOPROLOL SUCCINATE 75 MG: 50 TABLET, EXTENDED RELEASE ORAL at 17:02

## 2020-11-28 RX ADMIN — ACETAMINOPHEN 650 MG: 325 TABLET, FILM COATED ORAL at 11:49

## 2020-11-28 RX ADMIN — CETIRIZINE HYDROCHLORIDE 10 MG: 10 TABLET, FILM COATED ORAL at 06:11

## 2020-11-28 RX ADMIN — FAMOTIDINE 20 MG: 20 TABLET, FILM COATED ORAL at 06:08

## 2020-11-28 ASSESSMENT — ENCOUNTER SYMPTOMS
DIARRHEA: 0
CARDIOVASCULAR NEGATIVE: 1
CONSTITUTIONAL NEGATIVE: 1
NEUROLOGICAL NEGATIVE: 1
HEARTBURN: 0
RESPIRATORY NEGATIVE: 1
PSYCHIATRIC NEGATIVE: 1
VOMITING: 0
NAUSEA: 0
MUSCULOSKELETAL NEGATIVE: 1
ABDOMINAL PAIN: 1

## 2020-11-28 ASSESSMENT — PAIN DESCRIPTION - PAIN TYPE
TYPE: ACUTE PAIN
TYPE: ACUTE PAIN

## 2020-11-28 NOTE — PROGRESS NOTES
Hospital Medicine Daily Progress Note    Date of Service  11/28/2020    Chief Complaint  55 y.o. male admitted 11/3/2020 with abdominal pain    Hospital Course    55-year-old male with history of schizophrenia, diabetes, heart failure with ejection fraction 30% and atrial fibrillation presented 11/3 with left upper quadrant pain, initially the patient had perisplenic hematoma after fall however he developed fever and severe pain, CT scan showed perisplenic abscess with possible fistula and perforation, patient surgery for colostomy and for bowel perforation, blood culture on 11/31 showed Streptococcus sepsis,  A new drain was placed on 11/25, drain fluid culture from the abscess showed Streptococcus and yeast Candida, patient was evaluated by infectious disease and he was treated with ceftriaxone, fluconazole and later added vancomycin to treat Enterococcus PCN allergy.     Interval Problem Update  -Evaluated examined the patient at bedside, denied any new symptoms.  -Labs are reviewed, kidney functions are stable.      Consultants/Specialty  General surgeon  Infectious disease    Code Status  Full Code    Disposition  LTAC or SNF    Review of Systems  Review of Systems   Constitutional: Negative.    HENT: Negative.    Respiratory: Negative.    Cardiovascular: Negative.    Gastrointestinal: Positive for abdominal pain. Negative for diarrhea, heartburn, nausea and vomiting.   Genitourinary: Negative.    Musculoskeletal: Negative.    Skin: Negative.    Neurological: Negative.    Psychiatric/Behavioral: Negative.         Physical Exam  Temp:  [36.1 °C (96.9 °F)-36.6 °C (97.8 °F)] 36.5 °C (97.7 °F)  Pulse:  [] 100  Resp:  [18-20] 20  BP: ()/(48-74) 122/74  SpO2:  [90 %-100 %] 98 %    Physical Exam  Constitutional:       Appearance: He is obese. He is not ill-appearing.   HENT:      Head: Normocephalic and atraumatic.   Eyes:      General: No scleral icterus.  Abdominal:      General: There is no distension.       Palpations: There is no mass.      Tenderness: There is abdominal tenderness. There is no guarding.      Comments: Colostomy and a drain in place   Musculoskeletal:         General: No swelling or deformity.      Right lower leg: No edema.      Left lower leg: No edema.   Skin:     General: Skin is warm.      Coloration: Skin is not jaundiced or pale.      Findings: No bruising.   Neurological:      General: No focal deficit present.      Mental Status: He is alert and oriented to person, place, and time. Mental status is at baseline.      Cranial Nerves: No cranial nerve deficit.      Motor: No weakness.         Fluids    Intake/Output Summary (Last 24 hours) at 11/28/2020 1128  Last data filed at 11/28/2020 1100  Gross per 24 hour   Intake 500 ml   Output 265 ml   Net 235 ml       Laboratory  Recent Labs     11/26/20  0719 11/27/20  0445   WBC 11.8* 11.4*   RBC 3.31* 3.12*   HEMOGLOBIN 8.6* 7.9*   HEMATOCRIT 28.4* 26.4*   MCV 85.8 84.6   MCH 26.0* 25.3*   MCHC 30.3* 29.9*   RDW 56.7* 54.3*   PLATELETCT 234 229   MPV 11.0 10.9     Recent Labs     11/26/20  0719 11/27/20  0445 11/28/20  0400   SODIUM 134* 132* 134*   POTASSIUM 4.5 4.6 4.7   CHLORIDE 95* 94* 96   CO2 33 33 32   GLUCOSE 133* 124* 140*   BUN 21 27* 28*   CREATININE 0.82 0.98 0.97   CALCIUM 8.3* 8.4* 8.4*                   Imaging  CT-DRAIN-PERITONEAL   Final Result      Successful splenic abscess drainage tube placement.      Plan: Twice daily flushes with 10 mL of sterile saline. Monitor outputs.         CT-ABDOMEN-PELVIS WITH   Final Result      1.  Intraparenchymal collection in the anterior aspect of the spleen has increased in size from the prior exam.      2.  Small amount of free fluid in the pelvis has increased from the prior exam.      3.  Small bilateral pleural effusions layer posteriorly, left greater than right, slightly decreased from the prior exam. Adjacent airspace disease is likely atelectasis.      4.  Cholelithiasis.      5.   Right renal scarring.      [id number] VIEW   Final Result      1.  No evidence of bowel obstruction.   2.  Postoperative changes as described.      CT-ABDOMEN-PELVIS W/O   Final Result      Postsurgical changes with a left abdominal stoma. Parastomal densities may be related to postsurgical hematoma measuring up to 2.8 x 2.1 cm.      Small amount of free fluid in the abdomen and pelvis.      Collection along the anterior spleen measures approximately 2.9 x 4.3 cm is decreased in size compared to the prior examination. Evaluation is limited by lack of intravenous contrast.      Third spacing.      Trace free intraperitoneal air is likely related to recent surgery.      Moderate left and small right pleural effusions with overlying atelectasis/consolidation.      Cortical scarring of the right kidney.      Cholelithiasis.         DX-CHEST-PORTABLE (1 VIEW)   Final Result      1.  Bilateral airspace and interstitial infiltrates.      2.  Cardiomegaly.      US-RENAL   Final Result    Examination was limited due to poor sound penetration related to body habitus.   Grossly normal appearance of the kidneys.      EC-ECHOCARDIOGRAM COMPLETE W/ CONT   Final Result      DX-CHEST-PORTABLE (1 VIEW)   Final Result      No pneumothorax identified following right subclavian line placement      Worsening atelectasis with consolidation at the bases not excluded      Stable cardiac silhouette enlargement      CT-ABDOMEN-PELVIS WITH   Final Result      Interval decrease in size of perisplenic abscess. Pigtail catheter now in place      Abscess has likely fistulous communication with the abutting splenic flexure of the colon. Colon is otherwise normal in appearance      Mild hepatosplenomegaly      Decreasing small left pleural effusion      Subacute anterior wedge compression fractures in the caudal thoracic spine      CT-DRAIN-PERITONEAL   Final Result      1.  CT guided perisplenic abscess catheter drainage.   2.  The current plan  is to obtain a follow-up CT in 5-7 days..      OUTSIDE IMAGES-CT CHEST   Final Result      OUTSIDE IMAGES-CT ABDOMEN /PELVIS   Final Result      CT-ABDOMEN-PELVIS WITH   Final Result   Addendum 1 of 1   Addendum:   A prior noncontrast CT of the abdomen and pelvis from [facility] dated 10/19/2020 was made available for comparison. On the    prior study a perisplenic/subcapsular fluid collection was seen however    did not contain air at that time and    there was no discrete evidence of communication with the transverse colon.    Communication with splenic flexure of the colon and air within the    collection are new from the prior study.      Final      DX-CHEST-PORTABLE (1 VIEW)   Final Result      Slight interval interval improvement in congestive heart failure.           Assessment/Plan  * Spontaneous perforation of colon (HCC)- (present on admission)  Assessment & Plan  Admitted with LUQ abscess and perforation at splenic flexure   Segmental colectomy, colostomy, Patti pouch, I & D of abdominal wall abscess  Wound cultures: enterococcus and candida  And blood culture showed strep  Patient was evaluated and followed by ID  To continue ceftriaxone, fluconazole and vancomycin for 10 days after last surgery, last dose will be around 12/4  We might need to repeat images before discontinue antibiotics to make sure resolving on his abscess    Sepsis (HCC)  Assessment & Plan  Secondary to perforation of colon,  Wound cultures: enterococcus & candida.  Started on Linezolid and Fluconazole. But his QTc is prolonged.  - DCed linezolide and started on vancomycin.  - continue Fluconazole  - DC Zyvox due to prolonged QTc  - continue  vancomycin (11/17)  - continue ceftriaxone 11/17and   - Dced Flagyl on 11/21 due to nausea - nausea improved      Leucocytosis  Assessment & Plan  Due to intra-abdominal infection and abscess   continue antibiotics  Labs daily  Improving      Heart failure (HCC)-  (present on admission)  Assessment & Plan  History of ischemic cardiomyopathy with ejection fraction 30/40%  With right ventricle dysfunction  Continue metoprolol and lisinopril and Aldactone  Continue amiodarone for atrial fibrillation  Lasix as needed  Weight daily    Paroxysmal A-fib with RVR ([facility])- (present on admission)  Assessment & Plan  Seen by cardiac electrophysiologist in 2019 and deemed not to be good candidate for anticoagulation due to frequent falls 2/2 orthostatic hypotension  CHADSVASC of 3   He was given digoxin however discontinued due to toxicity  Heart rate is controlled  Continue metoprolol XL 75 mg twice daily and amiodarone 20 mg daily  Continue holding anticoagulation at this time for possible surgery  To follow-up with cardiologist as outpatient    Prolonged QT interval  Assessment & Plan  Patient's QTc on admission 477 increased to 535 with Linezolid, zofran, zoloft, olanzapine.  DCed Linezolid, zofran, olanzapine & zoloft  QTc improved from 535 to 425 to 473  Seen by psychiatry for medication recommendations started on resperidone and modafinil.  - avoid QT prolonging medication.    Hypothyroidism  Assessment & Plan  Obese male   TSH 16.0 and T4 1.14  start on Levothyroxine 112mcg daily.  Repeat thyroid function after 6 to 8 weeks    Schizophrenia ([facility])  Assessment & Plan  History of schizophrenia on olanzapine and sertraline.  Developed hallucination during this hospitalization, also had delayed prolonged QTC  Evaluated by psychiatrist and started with risperidone and modafinil, discontinue Zoloft and olanzapine  Stable at this time continue monitoring and follow-up with psychiatrist.      Elevated digoxin level  Assessment & Plan  H/o N/v and visual hallucinations including yellow halos, circles and squares  AFTER STARTING digoxin and 11/14 digoxin level 1.5 & on 11/17 Repeat digoxin level > 5,   & 0.8  Resolved  Keep holding digoxin    SRUTHI (acute kidney injury) ([facility])  Assessment &  Plan  Resolved    Type 2 diabetes mellitus, without long-term current use of insulin, with peripheral neuropathy (HCC)- (present on admission)  Assessment & Plan  Patient states blood sugar well controlled at home with metformin.   Last a1c of 7.8 on 11/10/20   - continue Lantus 10U daily  - Sliding scale insulin with hypoglycemia protocol  - Ensure strict glucose control          VTE prophylaxis: lovenox

## 2020-11-28 NOTE — PROGRESS NOTES
Pt report received from day shift RN Brittney. Pt is A and O x 4 at this time. He has c/o of 7/10 ABD pain at site of wound packing. Pt medicated with pain medication. POC reviewed. All questions answered. Bed locked and in low position. Call light is within reach.

## 2020-11-28 NOTE — PROGRESS NOTES
Bedside report received. Assessment completed.  Pt is A&O x4. Pt on 3 L NC.   Medicating for pain PRN per MAR.  Denies nausea.  Numbness and tingling in BLE. Baseline for patient.  Midline incision with wound vac to suction. LUQ ostomy and BETTE drain.    LDA triple lumen left subclavian and PIV.    ostomy . Adequate stool output. +flatus,   Zabala catheter in place.  Diabetic diet with consistent CHO diet. Tolerates well.   Pt maximum assistance. Unstable to get out of bed at this time.  Call light and belongings within reach. All needs met at this time. Fall Precautions and hourly rounding in place.

## 2020-11-29 ENCOUNTER — APPOINTMENT (OUTPATIENT)
Dept: RADIOLOGY | Facility: MEDICAL CENTER | Age: 55
DRG: 329 | End: 2020-11-29
Attending: INTERNAL MEDICINE
Payer: MEDICAID

## 2020-11-29 LAB
ALBUMIN SERPL BCP-MCNC: 2.3 G/DL (ref 3.2–4.9)
ALBUMIN/GLOB SERPL: 0.7 G/DL
ALP SERPL-CCNC: 149 U/L (ref 30–99)
ALT SERPL-CCNC: 8 U/L (ref 2–50)
ANION GAP SERPL CALC-SCNC: 5 MMOL/L (ref 7–16)
AST SERPL-CCNC: 18 U/L (ref 12–45)
BASOPHILS # BLD AUTO: 0.8 % (ref 0–1.8)
BASOPHILS # BLD: 0.08 K/UL (ref 0–0.12)
BILIRUB SERPL-MCNC: 0.4 MG/DL (ref 0.1–1.5)
BUN SERPL-MCNC: 25 MG/DL (ref 8–22)
CALCIUM SERPL-MCNC: 8.7 MG/DL (ref 8.5–10.5)
CHLORIDE SERPL-SCNC: 96 MMOL/L (ref 96–112)
CO2 SERPL-SCNC: 34 MMOL/L (ref 20–33)
CREAT SERPL-MCNC: 0.96 MG/DL (ref 0.5–1.4)
CRP SERPL HS-MCNC: 7.52 MG/DL (ref 0–0.75)
EOSINOPHIL # BLD AUTO: 0.49 K/UL (ref 0–0.51)
EOSINOPHIL NFR BLD: 5.1 % (ref 0–6.9)
ERYTHROCYTE [DISTWIDTH] IN BLOOD BY AUTOMATED COUNT: 54.6 FL (ref 35.9–50)
GLOBULIN SER CALC-MCNC: 3.1 G/DL (ref 1.9–3.5)
GLUCOSE BLD-MCNC: 116 MG/DL (ref 65–99)
GLUCOSE BLD-MCNC: 125 MG/DL (ref 65–99)
GLUCOSE BLD-MCNC: 164 MG/DL (ref 65–99)
GLUCOSE BLD-MCNC: 96 MG/DL (ref 65–99)
GLUCOSE SERPL-MCNC: 141 MG/DL (ref 65–99)
HCT VFR BLD AUTO: 26.8 % (ref 42–52)
HGB BLD-MCNC: 8.2 G/DL (ref 14–18)
IMM GRANULOCYTES # BLD AUTO: 0.05 K/UL (ref 0–0.11)
IMM GRANULOCYTES NFR BLD AUTO: 0.5 % (ref 0–0.9)
LYMPHOCYTES # BLD AUTO: 2.2 K/UL (ref 1–4.8)
LYMPHOCYTES NFR BLD: 22.9 % (ref 22–41)
MAGNESIUM SERPL-MCNC: 1.4 MG/DL (ref 1.5–2.5)
MCH RBC QN AUTO: 25.9 PG (ref 27–33)
MCHC RBC AUTO-ENTMCNC: 30.6 G/DL (ref 33.7–35.3)
MCV RBC AUTO: 84.8 FL (ref 81.4–97.8)
MONOCYTES # BLD AUTO: 0.8 K/UL (ref 0–0.85)
MONOCYTES NFR BLD AUTO: 8.3 % (ref 0–13.4)
NEUTROPHILS # BLD AUTO: 5.97 K/UL (ref 1.82–7.42)
NEUTROPHILS NFR BLD: 62.4 % (ref 44–72)
NRBC # BLD AUTO: 0 K/UL
NRBC BLD-RTO: 0 /100 WBC
PLATELET # BLD AUTO: 272 K/UL (ref 164–446)
PMV BLD AUTO: 10.6 FL (ref 9–12.9)
POTASSIUM SERPL-SCNC: 4.5 MMOL/L (ref 3.6–5.5)
PROCALCITONIN SERPL-MCNC: 0.37 NG/ML
PROT SERPL-MCNC: 5.4 G/DL (ref 6–8.2)
RBC # BLD AUTO: 3.16 M/UL (ref 4.7–6.1)
SODIUM SERPL-SCNC: 135 MMOL/L (ref 135–145)
WBC # BLD AUTO: 9.6 K/UL (ref 4.8–10.8)

## 2020-11-29 PROCEDURE — 85025 COMPLETE CBC W/AUTO DIFF WBC: CPT

## 2020-11-29 PROCEDURE — 700105 HCHG RX REV CODE 258: Performed by: INTERNAL MEDICINE

## 2020-11-29 PROCEDURE — 700102 HCHG RX REV CODE 250 W/ 637 OVERRIDE(OP): Performed by: STUDENT IN AN ORGANIZED HEALTH CARE EDUCATION/TRAINING PROGRAM

## 2020-11-29 PROCEDURE — A9270 NON-COVERED ITEM OR SERVICE: HCPCS | Performed by: STUDENT IN AN ORGANIZED HEALTH CARE EDUCATION/TRAINING PROGRAM

## 2020-11-29 PROCEDURE — 700102 HCHG RX REV CODE 250 W/ 637 OVERRIDE(OP): Performed by: PSYCHIATRY & NEUROLOGY

## 2020-11-29 PROCEDURE — 302146: Performed by: INTERNAL MEDICINE

## 2020-11-29 PROCEDURE — 700102 HCHG RX REV CODE 250 W/ 637 OVERRIDE(OP): Performed by: INTERNAL MEDICINE

## 2020-11-29 PROCEDURE — 700111 HCHG RX REV CODE 636 W/ 250 OVERRIDE (IP): Performed by: INTERNAL MEDICINE

## 2020-11-29 PROCEDURE — 86140 C-REACTIVE PROTEIN: CPT

## 2020-11-29 PROCEDURE — A9270 NON-COVERED ITEM OR SERVICE: HCPCS | Performed by: INTERNAL MEDICINE

## 2020-11-29 PROCEDURE — 80053 COMPREHEN METABOLIC PANEL: CPT

## 2020-11-29 PROCEDURE — 83735 ASSAY OF MAGNESIUM: CPT

## 2020-11-29 PROCEDURE — 99233 SBSQ HOSP IP/OBS HIGH 50: CPT | Performed by: INTERNAL MEDICINE

## 2020-11-29 PROCEDURE — 84145 PROCALCITONIN (PCT): CPT

## 2020-11-29 PROCEDURE — 82962 GLUCOSE BLOOD TEST: CPT

## 2020-11-29 PROCEDURE — A9270 NON-COVERED ITEM OR SERVICE: HCPCS | Performed by: PSYCHIATRY & NEUROLOGY

## 2020-11-29 PROCEDURE — 99232 SBSQ HOSP IP/OBS MODERATE 35: CPT | Performed by: INTERNAL MEDICINE

## 2020-11-29 PROCEDURE — 770001 HCHG ROOM/CARE - MED/SURG/GYN PRIV*

## 2020-11-29 RX ORDER — KETOCONAZOLE 20 MG/G
CREAM TOPICAL DAILY
Status: DISPENSED | OUTPATIENT
Start: 2020-11-29 | End: 2020-12-09

## 2020-11-29 RX ORDER — FUROSEMIDE 10 MG/ML
20 INJECTION INTRAMUSCULAR; INTRAVENOUS
Status: DISCONTINUED | OUTPATIENT
Start: 2020-11-29 | End: 2020-12-03

## 2020-11-29 RX ORDER — MAGNESIUM SULFATE HEPTAHYDRATE 40 MG/ML
4 INJECTION, SOLUTION INTRAVENOUS ONCE
Status: COMPLETED | OUTPATIENT
Start: 2020-11-29 | End: 2020-11-29

## 2020-11-29 RX ADMIN — LISINOPRIL 20 MG: 20 TABLET ORAL at 05:03

## 2020-11-29 RX ADMIN — RISPERIDONE 1 MG: 1 TABLET ORAL at 17:36

## 2020-11-29 RX ADMIN — Medication 400 MG: at 05:00

## 2020-11-29 RX ADMIN — FLUCONAZOLE 400 MG: 200 TABLET ORAL at 06:24

## 2020-11-29 RX ADMIN — LEVOTHYROXINE SODIUM 112 MCG: 112 TABLET ORAL at 05:01

## 2020-11-29 RX ADMIN — FUROSEMIDE 20 MG: 10 INJECTION, SOLUTION INTRAMUSCULAR; INTRAVENOUS at 05:02

## 2020-11-29 RX ADMIN — CEFTRIAXONE SODIUM 2 G: 2 INJECTION, POWDER, FOR SOLUTION INTRAMUSCULAR; INTRAVENOUS at 04:57

## 2020-11-29 RX ADMIN — MODAFINIL 100 MG: 100 TABLET ORAL at 05:01

## 2020-11-29 RX ADMIN — SPIRONOLACTONE 25 MG: 50 TABLET ORAL at 05:04

## 2020-11-29 RX ADMIN — RISPERIDONE 0.5 MG: 1 TABLET ORAL at 05:00

## 2020-11-29 RX ADMIN — METOPROLOL SUCCINATE 75 MG: 50 TABLET, EXTENDED RELEASE ORAL at 17:36

## 2020-11-29 RX ADMIN — TRAZODONE HYDROCHLORIDE 50 MG: 50 TABLET ORAL at 20:42

## 2020-11-29 RX ADMIN — HYDROCODONE BITARTRATE AND ACETAMINOPHEN 1 TABLET: 10; 325 TABLET ORAL at 04:16

## 2020-11-29 RX ADMIN — VANCOMYCIN HYDROCHLORIDE 1250 MG: 500 INJECTION, POWDER, LYOPHILIZED, FOR SOLUTION INTRAVENOUS at 08:23

## 2020-11-29 RX ADMIN — FAMOTIDINE 20 MG: 20 TABLET, FILM COATED ORAL at 05:00

## 2020-11-29 RX ADMIN — MAGNESIUM SULFATE IN WATER 4 G: 40 INJECTION, SOLUTION INTRAVENOUS at 08:28

## 2020-11-29 RX ADMIN — INSULIN GLARGINE 10 UNITS: 100 INJECTION, SOLUTION SUBCUTANEOUS at 05:13

## 2020-11-29 RX ADMIN — FUROSEMIDE 20 MG: 10 INJECTION, SOLUTION INTRAMUSCULAR; INTRAVENOUS at 17:36

## 2020-11-29 RX ADMIN — HYDROCODONE BITARTRATE AND ACETAMINOPHEN 1 TABLET: 10; 325 TABLET ORAL at 11:44

## 2020-11-29 RX ADMIN — CETIRIZINE HYDROCHLORIDE 10 MG: 10 TABLET, FILM COATED ORAL at 05:00

## 2020-11-29 RX ADMIN — Medication 400 MG: at 17:36

## 2020-11-29 RX ADMIN — HYDROCODONE BITARTRATE AND ACETAMINOPHEN 1 TABLET: 10; 325 TABLET ORAL at 17:36

## 2020-11-29 RX ADMIN — AMIODARONE HYDROCHLORIDE 200 MG: 200 TABLET ORAL at 05:00

## 2020-11-29 RX ADMIN — INSULIN HUMAN 3 UNITS: 100 INJECTION, SOLUTION PARENTERAL at 01:17

## 2020-11-29 RX ADMIN — METOPROLOL SUCCINATE 75 MG: 50 TABLET, EXTENDED RELEASE ORAL at 05:04

## 2020-11-29 ASSESSMENT — ENCOUNTER SYMPTOMS
COUGH: 0
PSYCHIATRIC NEGATIVE: 1
HEARTBURN: 0
CARDIOVASCULAR NEGATIVE: 1
MUSCULOSKELETAL NEGATIVE: 1
CHILLS: 0
ABDOMINAL PAIN: 1
VOMITING: 0
CONSTITUTIONAL NEGATIVE: 1
DIARRHEA: 0
SHORTNESS OF BREATH: 0
FEVER: 0
NAUSEA: 0
NEUROLOGICAL NEGATIVE: 1
RESPIRATORY NEGATIVE: 1

## 2020-11-29 ASSESSMENT — PAIN DESCRIPTION - PAIN TYPE
TYPE: ACUTE PAIN

## 2020-11-29 NOTE — PROGRESS NOTES
Infectious Disease Progress Note    Author: Cristy Holley M.D. Date & Time of service: 11/29/2020  11:47 AM    Chief Complaint:  Intrabdominal abscess and bacteremia     Interval History:   55 y.o. diabetic male admitted 11/3/2020.+ A. fib, CHD, and severe morbid obesity.  Seen by ID in the past for right foot abscess with cultures positive for MRSA and Proteus.  s/p GLF approximately 1 month ago and was admitted to [facility] with left upper quadrant pain.  CT at the time revealed a subcapsular splenic hematoma. Presented to ED complaining of abdominal pain.  11/9 AF WBC 9.4 Less pain in side but remains tender to touch. Denies SE Zyvox-counseled about sxs serotonin syndrome  11/11 T-max 99.7 WBC 15.4 patient underwent colectomy, colostomy placement and I&D of flank wound with wound VAC placement on 11/10/2020 by Dr. Desouza.  Postop course complicated by hypotension and transferred to the ICU.  On pressors.  Patient complaining of shortness of breath as well as lightheadedness.  Also has postop abdominal pain.  11/12 afebrile WBC 13.3.  Patient weaned off pressors overnight.  Operative cultures growing group D Enterococcus and yeast.  Patient feeling better today.  Improved shortness of breath.  Abdominal pain stable  11/13 afebrile WBC 13.8 patient continues to feel better.  He is inquiring when he can have food by mouth.  11/14 afebrile WBC 9.9.  Patient feeling a bit better today.  His diet has been slowly advanced.  He is however complaining of some sharp abdominal pain when he coughs  11/16  AF, O2 RA, abdominal pain and poor appetite.   11/17 AF, O2 1 L NC , WBC 19.1. Drain output 465 cc yesterday. Ongoing nausea, poor appetite and abdomen is quite tender on palpation.  Will broaden antibiotics.   11/18 AF, O2 1 L NC, WBC 18.1, Drain output- 610 cc. Patient still has abdominal pain and significant nausea.  Examined abdominal wound photos and discussed with wound care team.  He has a large wound  but clean appearing base with granulation tissue.  No obvious infection in the surrounding tissues.  Left side wound with packing in place, ongoing drainage but clearing .    AF, tachycardia and hypertensive , WBC 17.2, drain output-205 cc yesterday. C/o nausea and agree with plan to transition Flagyl to IV to see if this helps.     AF WBC 13.6 feels OK-abd pain controlled. Poor appetite and not eating much. Denies SE abx-nausea resolved   AF WBC 16 no new complaints-no new cough, dyspnea and abd pain the same. Appetite so-so.   AF WBC 13 CT reviewed H and H continues to decrease. Somnolent today-no acute events overnight   AF 11.8 had new drain placed-tolerated well-still poor appetite    AF WBC 11.4 cxs with enterococcus-tired today. No new complaints   AF WBC 9.6 c/o itching face and back. Pain controlled     Review of Systems:  Review of Systems   Constitutional: Negative for chills and fever.   Respiratory: Negative for cough and shortness of breath.    Gastrointestinal: Positive for abdominal pain. Negative for diarrhea, nausea and vomiting.   Skin: Positive for itching and rash.   All other systems reviewed and are negative.      Hemodynamics:  Temp (24hrs), Av.4 °C (97.6 °F), Min:36.2 °C (97.2 °F), Max:36.6 °C (97.9 °F)  Temperature: 36.5 °C (97.7 °F)  Pulse  Av.4  Min: 51  Max: 161   Blood Pressure: (!) 99/54(RN notifed)      Physical Exam:  Physical Exam  Vitals signs and nursing note reviewed.   Constitutional:       General: He is not in acute distress.     Appearance: He is obese. He is not ill-appearing, toxic-appearing or diaphoretic.   HENT:      Nose: No congestion or rhinorrhea.   Eyes:      General: No scleral icterus.     Extraocular Movements: Extraocular movements intact.      Pupils: Pupils are equal, round, and reactive to light.   Neck:      Musculoskeletal: No neck rigidity.   Cardiovascular:      Rate and Rhythm: Tachycardia present. Rhythm  irregular.   Pulmonary:      Effort: Pulmonary effort is normal. No respiratory distress.      Breath sounds: No stridor. No wheezing.      Comments: Line right chest nontender  Abdominal:      General: There is no distension.      Palpations: Abdomen is soft.      Tenderness: There is abdominal tenderness. There is no guarding.      Comments: Wound VAC in place, no sign infection in surrounding tissue  Ostomy  Drain serosanguinous   Musculoskeletal:      Right lower leg: Edema present.      Left lower leg: Edema present.   Lymphadenopathy:      Cervical: No cervical adenopathy.   Skin:     General: Skin is warm.      Coloration: Skin is not jaundiced.      Findings: Bruising and rash present.      Comments: Rash on face-not on chest or abd. Macular blanching  Unable to visualize back   Neurological:      General: No focal deficit present.      Mental Status: He is alert and oriented to person, place, and time.   Psychiatric:         Mood and Affect: Mood normal.         Behavior: Behavior normal.         Meds:    Current Facility-Administered Medications:   •  magnesium sulfate  •  acetaminophen  •  furosemide  •  vancomycin  •  diphenhydrAMINE  •  metoclopramide  •  risperiDONE  •  risperiDONE  •  magnesium oxide  •  modafinil  •  levothyroxine  •  HYDROcodone/acetaminophen  •  prochlorperazine  •  metoprolol SR  •  lisinopril  •  amiodarone  •  traZODone  •  Metoprolol Tartrate  •  Respiratory Therapy Consult  •  MD Alert...Vancomycin per Pharmacy  •  cefTRIAXone (ROCEPHIN) IV  •  spironolactone  •  cetirizine  •  famotidine  •  fluconazole  •  insulin glargine  •  insulin regular **AND** POC Blood Glucose **AND** NOTIFY MD and PharmD **AND** glucose **AND** dextrose 50%  •  albuterol    Labs:  Recent Labs     11/27/20  0445 11/29/20  0125   WBC 11.4* 9.6   RBC 3.12* 3.16*   HEMOGLOBIN 7.9* 8.2*   HEMATOCRIT 26.4* 26.8*   MCV 84.6 84.8   MCH 25.3* 25.9*   RDW 54.3* 54.6*   PLATELETCT 229 272   MPV 10.9 10.6      NEUTSPOLYS 67.60 62.40   LYMPHOCYTES 18.00* 22.90   MONOCYTES 10.20 8.30   EOSINOPHILS 3.00 5.10   BASOPHILS 0.40 0.80     Recent Labs     11/27/20  0445 11/28/20  0400 11/29/20  0125   SODIUM 132* 134* 135   POTASSIUM 4.6 4.7 4.5   CHLORIDE 94* 96 96   CO2 33 32 34*   GLUCOSE 124* 140* 141*   BUN 27* 28* 25*     Recent Labs     11/27/20  0445 11/28/20  0400 11/29/20  0125   ALBUMIN 2.4* 2.2* 2.3*   TBILIRUBIN 0.6 0.4 0.4   ALKPHOSPHAT  --  117* 149*   TOTPROTEIN  --  5.2* 5.4*   ALTSGPT  --  7 8   ASTSGOT  --  16 18   CREATININE 0.98 0.97 0.96       Imaging:  Ct-abdomen-pelvis W/o    Result Date: 11/17/2020 11/17/2020 1:51 PM HISTORY/REASON FOR EXAM:  Nausea/vomiting; elevated WBC; R/O abscess. TECHNIQUE/EXAM DESCRIPTION: CT scan of the abdomen and pelvis without contrast. Noncontrast helical scanning was obtained from the diaphragmatic domes through the pubic symphysis. Low dose optimization technique was utilized for this CT exam including automated exposure control and adjustment of the mA and/or kV according to patient size. COMPARISON: 11/8/2020. FINDINGS: There is a moderate left and small right pleural effusion with overlying atelectasis/consolidation. Trace free air is seen in the abdomen. Evaluation of parenchymal and vascular structures is limited by the lack of intravenous contrast. The liver appears  unremarkable. There are gallstones within the gallbladder. There is hypodensity along the anterior aspect of the spleen measuring approximately 4.3 x 2.9 cm. No adrenal mass is identified. There is right renal atrophy with cortical scarring. There is no  evidence of hydronephrosis. Pancreas appears unremarkable. Aorta is not aneurysmal. There are small inguinal, retroperitoneal and mesenteric lymph nodes. There is no evidence of bowel obstruction. Visualized appendix appears unremarkable. There is a left abdominal stoma. There is parastomal density measuring up to 2.8 x 2.1 cm likely representing a  postsurgical hematoma. There is a small amount of free fluid in the abdomen and pelvis. Bladder is mildly distended. There is a surgical drain in the left abdomen. There is third spacing. Degenerative changes are seen in the spine. Compression deformities of T11 and T12 are again noted. Bones are demineralized.     Postsurgical changes with a left abdominal stoma. Parastomal densities may be related to postsurgical hematoma measuring up to 2.8 x 2.1 cm. Small amount of free fluid in the abdomen and pelvis. Collection along the anterior spleen measures approximately 2.9 x 4.3 cm is decreased in size compared to the prior examination. Evaluation is limited by lack of intravenous contrast. Third spacing. Trace free intraperitoneal air is likely related to recent surgery. Moderate left and small right pleural effusions with overlying atelectasis/consolidation. Cortical scarring of the right kidney. Cholelithiasis.     Ct-abdomen-pelvis With    Result Date: 11/9/2020 11/8/2020 11:34 PM HISTORY/REASON FOR EXAM:  Abdominal infection suspected; Has perisplenic abscess with possible connection to bowel TECHNIQUE/EXAM DESCRIPTION: CT of the abdomen and pelvis with contrast. Contrast-enhanced helical scanning was obtained from the diaphragmatic domes through the pubic symphysis following the bolus administration of 100 mL of nonionic contrast (Omnipaque 350) without complication. Low dose optimization technique was utilized for this CT exam including automated exposure control and adjustment of the mA and/or kV according to patient size. COMPARISON: 11/3/2020 CT FINDINGS: The visualized lung bases show diminished small left pleural fluid that layers dependently. There is similar middle and left lower lobe atelectasis . Similar multichamber cardiac enlargement CT Abdomen: There is a new pigtail catheter in the anterior perisplenic fluid collection. The collection does diminish in size now measuring 58 x 33 from 79 x 40 mm.  There is still gas within it and it is in direct contact with the splenic flexure of the colon. A small amount of gas is also seen in the more superficial soft tissues inferior to the pigtail catheter which crosses the ninth intercostal space No new abscess is detected Contrast is present in the small bowel but not yet in the transverse colon. No contrast extravasation is seen. No abnormal bowel dilatation The pancreas, adrenal glands and kidneys enhance normally. Mild hepatosplenomegaly Some low-density in the gallbladder likely indicating cholelithiasis There is no lymphadenopathy or aneurysmal change of the aorta. Compression deformities in the spine are again seen with subacute fractures resulting in anterior wedging at T11 and T12 There is diffuse fat stranding indicating anasarca CT Pelvis: Visualized pelvic structures are unremarkable. Prostate is within normal limits for age. No lymphadenopathy. There is no free air or free fluid.     Interval decrease in size of perisplenic abscess. Pigtail catheter now in place Abscess has likely fistulous communication with the abutting splenic flexure of the colon. Colon is otherwise normal in appearance Mild hepatosplenomegaly Decreasing small left pleural effusion Subacute anterior wedge compression fractures in the caudal thoracic spine    Ct-abdomen-pelvis With    Addendum Date: 11/5/2020    Addendum: A prior noncontrast CT of the abdomen and pelvis from [facility] dated 10/19/2020 was made available for comparison. On the prior study a perisplenic/subcapsular fluid collection was seen however did not contain air at that time and there was no discrete evidence of communication with the transverse colon. Communication with splenic flexure of the colon and air within the collection are new from the prior study.    Result Date: 11/5/2020  11/3/2020 4:02 PM HISTORY/REASON FOR EXAM:  Abd pain, unspecified; IV contrast only. TECHNIQUE/EXAM DESCRIPTION:    CT scan of the abdomen and pelvis with contrast. Contrast-enhanced helical scanning was obtained from the diaphragmatic domes through the pubic symphysis following the bolus administration of nonionic contrast without complication. 100 mL of Omnipaque 350 nonionic contrast was administered without complication. Low dose optimization technique was utilized for this CT exam including automated exposure control and adjustment of the mA and/or kV according to patient size. COMPARISON: 9/26/2018 FINDINGS: Chest Base: Small left pleural effusion with mild bibasilar atelectasis. Heart is mildly enlarged. Liver:  Diffuse hypoattenuation of the liver suggesting fatty infiltration. Moderately enlarged. Gallbladder: Cholelithiasis. Biliary tract: Nondilated. Pancreas: Normal. Spleen: There is a rim-enhancing perisplenic fluid collection which contains air and which communicates with the splenic flexure of the colon. This most likely represents an abscess and measures about 8.7 x 7.9 cm. The anterior aspect of the spleen is nonenhancing with irregular margins could be related to infection of splenic parenchyma or infarction. Adrenals: Normal. Kidneys and Collecting Systems:  There is mild right renal cortical scarring. No hydronephrosis. No renal mass. Gastrointestinal tract:  No bowel obstruction. The appendix is normal. Peritoneum: No free air or free fluid. Reproductive organs:  Normal. Bladder:  Normal. Vessels:  Normal caliber. Lymph  Nodes:  No lymphadenopathy. Abdominal wall: Within normal limits. Bones:  Age indeterminate possibly subacute to chronic T11 and T12 superior endplate compression fractures.     1.  Peripherally enhancing perisplenic fluid collection which communicates with the splenic flexure of the colon and which contains foci of air is most consistent with an abscess/infected fluid collection. There is nonenhancement of the anterior aspect of the spleen suggesting infection/infarction. Surgical  consultation is recommended. 2.  Hepatomegaly and hepatic steatosis. 3.  Cholelithiasis. 4.  Right renal cortical scarring. No hydronephrosis. 5.  Small left pleural effusion. Mild bibasilar atelectasis. 6.  Subacute to chronic appearing T11 and T12 superior endplate compression fractures. These findings were discussed with SVETLANA PAREDES on 11/3/2020 4:49 PM.     Ct-drain-peritoneal    Result Date: 11/4/2020 11/4/2020 5:19 PM HISTORY/REASON FOR EXAM:  Joya Splenic Fluid Collection TECHNIQUE/EXAM DESCRIPTION: LUQ parasplenic abscess drainage with CT guidance. Low dose optimization technique was utilized for this CT exam including automated exposure control and adjustment of the mA and/or kV according to patient size. PROCEDURE: Informed consent was obtained. SEDATION: Moderate sedation was provided. Pulse oximetry and continuous cardiac monitoring by the nurse was performed throughout the exam. Intraservice time was 30 minutes. Localizing CT images were obtained with the patient in supine position. The skin was prepped with Betadine and draped in a sterile fashion. Following local anesthesia with 1% Lidocaine, a 17 G guiding needle was placed and needle path confirmed with CT. An Amplatz guidewire was placed and following serial tract dilatation, a 10 [place] pigtail locking catheter was placed. A specimen was collected and submitted for culture and sensitivity and Gram stain. Total of 6 mL of Brownish fluid was drained. The catheter was secured to the skin and connected to suction bulb drainage. Final CT images were obtained documenting catheter position. The patient tolerated the procedure well with no evidence of complication. COMPARISON: CT scan abdomen and pelvis 11/3/2020 FINDINGS: The final CT images show satisfactory catheter position within the target collection.     1.  CT guided perisplenic abscess catheter drainage. 2.  The current plan is to obtain a follow-up CT in 5-7 days..    [id number]  View    Result Date: 11/20/2020 11/20/2020 2:00 PM HISTORY/REASON FOR EXAM:  Abdominal Pain. TECHNIQUE/EXAM DESCRIPTION AND NUMBER OF VIEWS:  1 view(s) of the abdomen. COMPARISON: None FINDINGS: No evidence for small bowel obstruction. Surgical clips present in the mid abdomen. LEFT mid abdominal stoma noted. Surgical drain present in the LEFT lateral abdomen. Catheter projects of the RIGHT midabdomen, possibly wound VAC. Degenerative change of lumbar spine.     1.  No evidence of bowel obstruction. 2.  Postoperative changes as described.    Dx-chest-portable (1 View)    Result Date: 11/17/2020 11/17/2020 9:19 AM HISTORY/REASON FOR EXAM: Elevated white blood cell count. TECHNIQUE/EXAM DESCRIPTION AND NUMBER OF VIEWS: Single portable view of the chest. COMPARISON: 11/10/2020 FINDINGS: There are bilateral interstitial and airspace infiltrates. There is small bilateral pleural effusion. The heart is enlarged. Right subclavian central line is again seen.     1.  Bilateral airspace and interstitial infiltrates. 2.  Cardiomegaly.    Dx-chest-portable (1 View)    Result Date: 11/10/2020  11/10/2020 12:29 PM HISTORY/REASON FOR EXAM: new central line. TECHNIQUE/EXAM DESCRIPTION AND NUMBER OF VIEWS: Single AP view of the chest. COMPARISON: November 3 FINDINGS: Hardware: ] Tip overlies the cavoatrial junction Lungs: Linear middle lobe and left basilar opacity is seen. Volumes are low Pleura:  No pneumothorax is identified Heart and mediastinum: There is stable cardiac silhouette enlargement.     No pneumothorax identified following right subclavian line placement Worsening atelectasis with consolidation at the bases not excluded Stable cardiac silhouette enlargement    Dx-chest-portable (1 View)    Result Date: 11/3/2020  11/3/2020 3:41 PM HISTORY/REASON FOR EXAM:  Chest Pain. TECHNIQUE/EXAM DESCRIPTION AND NUMBER OF VIEWS: Single portable view of the chest. COMPARISON: September 10, 2020 FINDINGS: The cardiac size is  enlarged. There is interstitial prominence. Lung aeration has improved. No pneumothorax. No obvious pleural effusion.     Slight interval interval improvement in congestive heart failure.    Us-renal    Result Date: 11/11/2020 11/11/2020 6:21 PM HISTORY/REASON FOR EXAM:  Abnormal Labs TECHNIQUE/EXAM DESCRIPTION: Renal ultrasound. COMPARISON:  None FINDINGS: The right kidney measures 9.39 cm. The left kidney measures 9.54 cm. There is no hydronephrosis. There are no abnormal calcifications. The bladder not fully the time of imaging.      Examination was limited due to poor sound penetration related to body habitus. Grossly normal appearance of the kidneys.    Ec-echocardiogram Complete W/ Cont    Result Date: 11/11/2020  Transthoracic Echo Report Echocardiography Laboratory CONCLUSIONS Normal left ventricular chamber size. Moderately reduced left ventricular systolic function. Left ventricular ejection fraction is visually estimated to be 30-40%; variable related to atrial fibrillation. Mild mitral regurgitation. Moderately dilated right ventricle. Reduced right ventricular systolic function. Right heart pressures are normal. No prior study is available for comparison. LV EF:        % FINDINGS Left Ventricle 3 mL of contrast was administered. Contrast was used to enhance visualization of the endocardial border. Existing IV was used. Mild concentric left ventricular hypertrophy. Normal left ventricular chamber size.  Moderately reduced left ventricular systolic function. Left ventricular ejection fraction is visually estimated to be 30-40%. Diastolic function is difficult to assess with atrial fibrillation. Right Ventricle Moderately dilated right ventricle. Reduced right ventricular systolic function. Right Atrium Enlarged right atrium. Normal inferior vena cava size and inspiratory collapse. Left Atrium Normal left atrial size. Mitral Valve Structurally normal mitral valve. Mild mitral regurgitation. No mitral  stenosis. Aortic Valve Tricuspid aortic valve. Structurally normal aortic valve. No stenosis or regurgitation seen. Tricuspid Valve Structurally normal tricuspid valve. Mild tricuspid regurgitation. Right atrial pressure is estimated to be 3 mmHg. Estimated right ventricular systolic pressure  is 25 mmHg. No tricuspid stenosis. Right heart pressures are normal. Pulmonic Valve Structurally normal pulmonic valve without significant stenosis or regurgitation. Pericardium Normal pericardium without effusion. Aorta The aortic root is normal.  Ascending aorta diameter is 3.6 cm. Germain Pacheco M.D. (Electronically Signed) Final Date:     11 November 2020                 12:38 Amended:        11 November 2020 12:40    CT scan 11/24  CT Abdomen:  The liver, adrenal glands, and pancreas are unremarkable.     There are multiple nitrogen-containing calculi in the gallbladder.     Hypodense collection in the anterior spleen has increased in size to approximately 5.8 x 4.6 cm, previously 4.2 x 2.9 cm. There is stranding in the adjacent fat.     The kidneys enhance symmetrically. There is right renal scarring.     Left abdominal colostomy is again noted with small adjacent hematomas in the subcutaneous fat. There is residual contrast in the distal colon. No contrast extravasation or pneumoperitoneum is appreciated.     There are calcifications in the arteries.     Micro:  Results     Procedure Component Value Units Date/Time    CULTURE WOUND W/ GRAM STAIN [[id number]]  (Abnormal)  (Susceptibility) Collected: 11/25/20 1652    Order Status: Completed Specimen: Wound Updated: 11/28/20 1602     Significant Indicator POS     Source WND     Site Joya-splenic drain     Culture Result Growth noted after further incubation, see below for  organism identification.       Gram Stain Result Moderate WBCs.  Rare Gram positive cocci.       Culture Result Enterococcus faecalis  Rare growth      Narrative:      Collected By:[id number] ERLIN  GERARDO SPARROW  Joya-splenic collection.  Collected By:[id number] ERLIN SPARROW    Susceptibility     Enterococcus faecalis (1)     Antibiotic Interpretation Microscan Method Status    Daptomycin Sensitive 4 mcg/mL ISAMAR Final    Ampicillin Sensitive <=2 mcg/mL ISAMAR Final    Gent Synergy Sensitive <=500 mcg/mL ISAMAR Final    Vancomycin Sensitive 1 mcg/mL ISAMAR Final    Penicillin Sensitive 2 mcg/mL ISAMAR Final                   GRAM STAIN [[id number]] Collected: 11/25/20 1652    Order Status: Completed Specimen: Wound Updated: 11/26/20 0944     Significant Indicator .     Source WND     Site Joya-splenic drain     Gram Stain Result Moderate WBCs.  Rare Gram positive cocci.      Narrative:      Collected By:[id number] ERLIN SPARROW  Joya-splenic collection.  Collected By:[id number] ERLIN SPARROW    FLUID CULTURE W/GRAM STAIN [[id number]] Collected: 11/25/20 1705    Order Status: Sent Specimen: Body Fluid from Peritoneal Fluid     FLUID CULTURE W/GRAM STAIN [[id number]] Collected: 11/25/20 1625    Order Status: Canceled Specimen: Other from Peritoneal Fluid           Assessment:  Active Hospital Problems    Diagnosis   • *Spontaneous perforation of colon ([facility]) [K63.1]   • Leucocytosis [D72.829]   • Schizophrenia ([facility]) [F20.9]   • Sepsis ([facility]) [A41.9]   • Elevated digoxin level [R78.89]   • SRUTHI (acute kidney injury) ([facility]) [N17.9]   • Adrenal insufficiency ([facility]) [E27.40]   • LV dysfunction [I51.9]   • Paroxysmal A-fib with RVR ([facility]) [I48.0]   • Type 2 diabetes mellitus, without long-term current use of insulin, with peripheral neuropathy ([facility]) [E11.9]   • Hypothyroidism [E03.9]   • Prolonged QT interval [R94.31]     Hospital Course:   CT scan on 11/3 revealed a peripherally enhancing perisplenic fluid collection measuring 8.7 x 7.9 cm, communicating with the splenic flexure of the colon and contained foci of air consistent with abscess/infected fluid.  Also noted nonenhancing of the anterior aspect of the spleen suggesting  infection/infarction.    Status post surgery on 11/10/2020    Leukocytosis -persistent. Decreased     Acute kidney injury- improved   Renal ultrasound-unremarkable  Nephrology following  Avoid nephrotoxins       Perisplenic abscess with possible communication to the bowel  Left upper quadrant abscess  s/p ground-level fall complicated by a subcapsular splenic hematoma  Afebrile  Splenic enhancement on CT, suggesting infection or infarction  -s/p drainage 11/4 and peritoneal fluid Efaecalis, ampicillin sensitive and C albicans  S/p segmental colectomy, colostomy, irrigation and debridement of flank wound and wound VAC placement on 11/10/2020 by Dr. Desouza.  Patient found to have a left upper quadrant abscess inferior to the spleen with a large bowel wall defect.  Also noted to have an abdominal wall abscess.  Op cultures (colon perforation) - E faecalis and Candida albicans.    -CT abdomen pelvis without on 11/17-hypodensity along anterior aspect of the spleen measuring 4.3 x 2.9 cm.  Parastomal density measuring two-point by 2.1 cm likely representing postsurgical hematoma.  This is decreased in size from prior.  Small amount of free fluid in the abdomen pelvis.  -Drain removed on 11/21  -CT 11/24 with enlarging anterior splenic fluid collection  Deemed nonsurical-  -11/25 new drain placed by IR. Culture + Efaecalis     Continue vancomycin to treat the Enterococcus (PCN allergic) for now-(prior Efaecalis)  Continue ceftriaxone to provide gram-negative coverage.    Continue fluconazole 400 mg daily  for the Candida  Anticipate 10 day course from date of last surgery/source control  Tentative stop date 12/5/2020     Rash, new  Atypical for drug rash  Trial Nizoral to face daily for 7-10 days  Encourage OOB    Diabetes mellitus  Keep BS under 150 to help control current infection  BS         Antibiotic allergies: Daptomycin rash, penicillins rash and hives, tolerates cephalosporins-discussed allergies and he  states that he has developed significant rash and hives with penicillins several times, most recently a year and a half ago- will avoid penicillins

## 2020-11-29 NOTE — PROGRESS NOTES
Pharmacy Kinetics 55 y.o. male on Vancomycin Day # 12 2020    Currently on Vancomycin 1750 mg iv q24hr    Provider specified end date: Tentatively planned for 10 days from date of last surgery/ source control (drains placed - tentative stop date of )     Indication for Treatment: IAI     Pertinent history per medical record: Admitted on 11/3/2020 for  concern of abdominal pain after surgical intervention. Concern for subsequent colonic perforation and possible abscess. ID and surgery consulted.     Other antibiotics: Ceftriaxone 2 grams q24H, fluconazole 400 mg q24H     Allergies: Daptomycin, Pcn [penicillins], Unasyn [ampicillin-sulbactam sodium], and Vancomycin      List concerns for renal function: BMI, BUN:SCr> 20:1     Pertinent cultures to date:   11/10: Colonic perforation- e. faecalis     MRSA nares swab if pneumonia is a concern (ordered/positive/negative/n-a): N/A    Recent Labs     20  0719 20  0445   WBC 11.8* 11.4*   NEUTSPOLYS 69.60 67.60     Recent Labs     20  0719 20  0445 20  0400   BUN 21 27* 28*   CREATININE 0.82 0.98 0.97   ALBUMIN 2.1* 2.4* 2.2*     Recent Labs     20  0445   VANCOTROUGH 17.9       Intake/Output Summary (Last 24 hours) at 2020 1651  Last data filed at 2020 1100  Gross per 24 hour   Intake 500 ml   Output 205 ml   Net 295 ml      /74   Pulse 100   Temp 36.5 °C (97.7 °F) (Temporal)   Resp 20   Ht 1.829 m (6')   Wt (!) 194.5 kg (428 lb 12.7 oz)   SpO2 98%  Temp (24hrs), Av.3 °C (97.4 °F), Min:36.1 °C (96.9 °F), Max:36.6 °C (97.8 °F)      A/P   1. Vancomycin dose change: No. Dose decreased on 20.   2. Next vancomycin level: Plan for    3. Goal trough: 10 - 15 mcg/mL   4. Comments: Renal indices stabilizing with trend downward today. Borderline hypotensive with hold parameters in placed on cardiovascular agents. Otherwise VS stable. Afebrile. No new CBC today.     Ashley Shetty, XeniaD,  BCPS

## 2020-11-29 NOTE — PROGRESS NOTES
Pharmacy Kinetics      55 y.o. male on Vancomycin Day # 12           2020     Currently on Vancomycin 1750 mg iv q24hr     Provider specified end date: Per ID - Tentatively planned for 10 days from date of last surgery/ source control (drains placed - tentative stop date of )     Indication for Treatment: IAI     Pertinent history per medical record: Admitted on 11/3/2020 for  concern of abdominal pain after surgical intervention. Concern for subsequent colonic perforation and possible abscess. ID and surgery consulted.     Other antibiotics: Ceftriaxone 2 grams q24H, fluconazole 400 mg q24H     Allergies: Daptomycin, Pcn [penicillins], Unasyn [ampicillin-sulbactam sodium], and Vancomycin      List concerns for renal function: BMI, BUN:SCr> 20:1     Pertinent cultures to date:   11/10: Colonic perforation- e. faecalis     MRSA nares swab if pneumonia is a concern (ordered/positive/negative/n-a): N/A    Recent Labs     20  0445 20  0125   WBC 11.4* 9.6   NEUTSPOLYS 67.60 62.40     Recent Labs     20  0445 20  0400 20  0125   BUN 27* 28* 25*   CREATININE 0.98 0.97 0.96   ALBUMIN 2.4* 2.2* 2.3*     Recent Labs     20  0445   VANCOTROUGH 17.9       Intake/Output Summary (Last 24 hours) at 2020 1520  Last data filed at 2020 0905  Gross per 24 hour   Intake 120 ml   Output 1265 ml   Net -1145 ml      BP (!) 99/54   Pulse 98   Temp 36.5 °C (97.7 °F) (Temporal)   Resp 19   Ht 1.829 m (6')   Wt (!) 194.5 kg (428 lb 12.7 oz)   SpO2 93%  Temp (24hrs), Av.4 °C (97.6 °F), Min:36.2 °C (97.2 °F), Max:36.6 °C (97.9 °F)      A/P   1. Vancomycin dose change: No. Dose reduced on 20.   2. Next vancomycin level: Plan for    3. Goal trough: 10 - 15 mcg/mL   4. Comments: Renal function stabilizing. BP remains soft with hold parameters on cardiovascular agents. Leukocytosis resolved. Afebrile.     Ashley Shetty PharmD, BCPS

## 2020-11-29 NOTE — PROGRESS NOTES
Hospital Medicine Daily Progress Note    Date of Service  11/29/2020    Chief Complaint  55 y.o. male admitted 11/3/2020 with abdominal pain    Hospital Course    55-year-old male with history of schizophrenia, diabetes, heart failure with ejection fraction 30% and atrial fibrillation presented 11/3 with left upper quadrant pain, initially the patient had perisplenic hematoma after fall however he developed fever and severe pain, CT scan showed perisplenic abscess with possible fistula and perforation, patient surgery for colostomy and for bowel perforation, blood culture on 11/31 showed Streptococcus sepsis,  A new drain was placed on 11/25, drain fluid culture from the abscess showed Streptococcus and yeast Candida, patient was evaluated by infectious disease and he was treated with ceftriaxone, fluconazole and later added vancomycin to treat Enterococcus PCN allergy stop date will be 12/5/2020..     Interval Problem Update  -Evaluated examined the patient at bedside, denied any new symptoms.  -Labs are reviewed, improving on leukocytosis and kidney function.  -The patient is ready for discharge/placement      Consultants/Specialty  General surgeon  Infectious disease    Code Status  Full Code    Disposition  LTAC or SNF  Medically cleared for placement.      Review of Systems  Review of Systems   Constitutional: Negative.    HENT: Negative.    Respiratory: Negative.    Cardiovascular: Negative.    Gastrointestinal: Positive for abdominal pain. Negative for diarrhea, heartburn, nausea and vomiting.   Genitourinary: Negative.    Musculoskeletal: Negative.    Skin: Negative.    Neurological: Negative.    Psychiatric/Behavioral: Negative.         Physical Exam  Temp:  [36.2 °C (97.2 °F)-36.6 °C (97.9 °F)] 36.5 °C (97.7 °F)  Pulse:  [] 98  Resp:  [16-19] 19  BP: ()/(54-78) 99/54  SpO2:  [90 %-95 %] 93 %    Physical Exam  Constitutional:       Appearance: He is obese. He is not ill-appearing.   HENT:       Head: Normocephalic and atraumatic.   Eyes:      General: No scleral icterus.  Abdominal:      General: There is no distension.      Palpations: There is no mass.      Tenderness: There is abdominal tenderness. There is no guarding.      Comments: Colostomy and a drain in place   Musculoskeletal:         General: No swelling or deformity.      Right lower leg: No edema.      Left lower leg: No edema.   Skin:     General: Skin is warm.      Coloration: Skin is not jaundiced or pale.      Findings: No bruising.   Neurological:      General: No focal deficit present.      Mental Status: He is alert and oriented to person, place, and time. Mental status is at baseline.      Cranial Nerves: No cranial nerve deficit.      Motor: No weakness.         Fluids    Intake/Output Summary (Last 24 hours) at 11/29/2020 1427  Last data filed at 11/29/2020 0905  Gross per 24 hour   Intake 120 ml   Output 1265 ml   Net -1145 ml       Laboratory  Recent Labs     11/27/20  0445 11/29/20  0125   WBC 11.4* 9.6   RBC 3.12* 3.16*   HEMOGLOBIN 7.9* 8.2*   HEMATOCRIT 26.4* 26.8*   MCV 84.6 84.8   MCH 25.3* 25.9*   MCHC 29.9* 30.6*   RDW 54.3* 54.6*   PLATELETCT 229 272   MPV 10.9 10.6     Recent Labs     11/27/20  0445 11/28/20  0400 11/29/20  0125   SODIUM 132* 134* 135   POTASSIUM 4.6 4.7 4.5   CHLORIDE 94* 96 96   CO2 33 32 34*   GLUCOSE 124* 140* 141*   BUN 27* 28* 25*   CREATININE 0.98 0.97 0.96   CALCIUM 8.4* 8.4* 8.7                   Imaging  CT-DRAIN-PERITONEAL   Final Result      Successful splenic abscess drainage tube placement.      Plan: Twice daily flushes with 10 mL of sterile saline. Monitor outputs.         CT-ABDOMEN-PELVIS WITH   Final Result      1.  Intraparenchymal collection in the anterior aspect of the spleen has increased in size from the prior exam.      2.  Small amount of free fluid in the pelvis has increased from the prior exam.      3.  Small bilateral pleural effusions layer posteriorly, left greater than  right, slightly decreased from the prior exam. Adjacent airspace disease is likely atelectasis.      4.  Cholelithiasis.      5.  Right renal scarring.      [id number] VIEW   Final Result      1.  No evidence of bowel obstruction.   2.  Postoperative changes as described.      CT-ABDOMEN-PELVIS W/O   Final Result      Postsurgical changes with a left abdominal stoma. Parastomal densities may be related to postsurgical hematoma measuring up to 2.8 x 2.1 cm.      Small amount of free fluid in the abdomen and pelvis.      Collection along the anterior spleen measures approximately 2.9 x 4.3 cm is decreased in size compared to the prior examination. Evaluation is limited by lack of intravenous contrast.      Third spacing.      Trace free intraperitoneal air is likely related to recent surgery.      Moderate left and small right pleural effusions with overlying atelectasis/consolidation.      Cortical scarring of the right kidney.      Cholelithiasis.         DX-CHEST-PORTABLE (1 VIEW)   Final Result      1.  Bilateral airspace and interstitial infiltrates.      2.  Cardiomegaly.      US-RENAL   Final Result    Examination was limited due to poor sound penetration related to body habitus.   Grossly normal appearance of the kidneys.      EC-ECHOCARDIOGRAM COMPLETE W/ CONT   Final Result      DX-CHEST-PORTABLE (1 VIEW)   Final Result      No pneumothorax identified following right subclavian line placement      Worsening atelectasis with consolidation at the bases not excluded      Stable cardiac silhouette enlargement      CT-ABDOMEN-PELVIS WITH   Final Result      Interval decrease in size of perisplenic abscess. Pigtail catheter now in place      Abscess has likely fistulous communication with the abutting splenic flexure of the colon. Colon is otherwise normal in appearance      Mild hepatosplenomegaly      Decreasing small left pleural effusion      Subacute anterior wedge compression fractures in the caudal  thoracic spine      CT-DRAIN-PERITONEAL   Final Result      1.  CT guided perisplenic abscess catheter drainage.   2.  The current plan is to obtain a follow-up CT in 5-7 days..      OUTSIDE IMAGES-CT CHEST   Final Result      OUTSIDE IMAGES-CT ABDOMEN /PELVIS   Final Result      CT-ABDOMEN-PELVIS WITH   Final Result   Addendum 1 of 1   Addendum:   A prior noncontrast CT of the abdomen and pelvis from [facility] dated 10/19/2020 was made available for comparison. On the    prior study a perisplenic/subcapsular fluid collection was seen however    did not contain air at that time and    there was no discrete evidence of communication with the transverse colon.    Communication with splenic flexure of the colon and air within the    collection are new from the prior study.      Final      DX-CHEST-PORTABLE (1 VIEW)   Final Result      Slight interval interval improvement in congestive heart failure.           Assessment/Plan  * Spontaneous perforation of colon (HCC)- (present on admission)  Assessment & Plan  Admitted with LUQ abscess and perforation at splenic flexure   Segmental colectomy, colostomy, Patti pouch, I & D of abdominal wall abscess  Wound cultures: enterococcus and candida  And blood culture showed strep  Patient was evaluated and followed by ID  To continue ceftriaxone, fluconazole and vancomycin for 10 days after last surgery, last dose will be around 12/5  We might need to repeat images before discontinue antibiotics to make sure resolving on his abscess    Sepsis (HCC)  Assessment & Plan  Secondary to perforation of colon,  Wound cultures: enterococcus & candida.  Started on Linezolid and Fluconazole. But his QTc is prolonged.  - DCed linezolide and started on vancomycin.  - continue Fluconazole  - DC Zyvox due to prolonged QTc  - continue  vancomycin (11/17)  - continue ceftriaxone 11/17and   - Dced Flagyl on 11/21 due to nausea - nausea  improved      Leucocytosis  Assessment & Plan  Due to intra-abdominal infection and abscess   continue antibiotics  Labs daily  Improving      Heart failure (HCC)- (present on admission)  Assessment & Plan  History of ischemic cardiomyopathy with ejection fraction 30/40%  With right ventricle dysfunction  Continue metoprolol and lisinopril and Aldactone  Continue amiodarone for atrial fibrillation  Lasix as needed  Weight daily    Paroxysmal A-fib with RVR ([facility])- (present on admission)  Assessment & Plan  Seen by cardiac electrophysiologist in 2019 and deemed not to be good candidate for anticoagulation due to frequent falls 2/2 orthostatic hypotension  CHADSVASC of 3   He was given digoxin however discontinued due to toxicity  Heart rate is controlled  Continue metoprolol XL 75 mg twice daily and amiodarone 20 mg daily  Continue holding anticoagulation at this time for possible surgery  To follow-up with cardiologist as outpatient    Prolonged QT interval  Assessment & Plan  Patient's QTc on admission 477 increased to 535 with Linezolid, zofran, zoloft, olanzapine.  DCed Linezolid, zofran, olanzapine & zoloft  QTc improved from 535 to 425 to 473  Seen by psychiatry for medication recommendations started on resperidone and modafinil.  - avoid QT prolonging medication.    Hypothyroidism  Assessment & Plan  Obese male   TSH 16.0 and T4 1.14  start on Levothyroxine 112mcg daily.  Repeat thyroid function after 6 to 8 weeks    Schizophrenia ([facility])  Assessment & Plan  History of schizophrenia on olanzapine and sertraline.  Developed hallucination during this hospitalization, also had delayed prolonged QTC  Evaluated by psychiatrist and started with risperidone and modafinil, discontinue Zoloft and olanzapine  Stable at this time continue monitoring and follow-up with psychiatrist.      Elevated digoxin level  Assessment & Plan  H/o N/v and visual hallucinations including yellow halos, circles and squares  AFTER STARTING  digoxin and 11/14 digoxin level 1.5 & on 11/17 Repeat digoxin level > 5,   & 0.8  Resolved  Keep holding digoxin    SRUTHI (acute kidney injury) (HCC)  Assessment & Plan  Resolved    Type 2 diabetes mellitus, without long-term current use of insulin, with peripheral neuropathy (HCC)- (present on admission)  Assessment & Plan  Patient states blood sugar well controlled at home with metformin.   Last a1c of 7.8 on 11/10/20   - continue Lantus 10U daily  - Sliding scale insulin with hypoglycemia protocol  - Ensure strict glucose control          VTE prophylaxis: lovenox

## 2020-11-29 NOTE — PROGRESS NOTES
Pt is A&O 4  Pain low, denies need for PRN medication at this time   Denies nausea  Tolerating a diabetic GI soft diet   Incision to midline abdomen, has wound vac in place  BETTE to LMQ, serosanguinous drainage, to bulb suction.  Flushed once per MD order  + Voids  + flatus per ostomy  + BM per ostomy, loose brown output  Up max assist  Bed alarm on, pt moderate fall risk per juli chávez  Reviewed plan of care with patient, bed in lowest position and locked, pt resting comfortably now, call light within reach, all needs met at this time. Interventions will be executed per plan of care

## 2020-11-30 ENCOUNTER — APPOINTMENT (OUTPATIENT)
Dept: RADIOLOGY | Facility: MEDICAL CENTER | Age: 55
DRG: 329 | End: 2020-11-30
Attending: INTERNAL MEDICINE
Payer: MEDICAID

## 2020-11-30 LAB
ALBUMIN SERPL BCP-MCNC: 2.3 G/DL (ref 3.2–4.9)
ALBUMIN/GLOB SERPL: 0.7 G/DL
ALP SERPL-CCNC: 137 U/L (ref 30–99)
ALT SERPL-CCNC: 10 U/L (ref 2–50)
ANION GAP SERPL CALC-SCNC: 4 MMOL/L (ref 7–16)
AST SERPL-CCNC: 19 U/L (ref 12–45)
BILIRUB SERPL-MCNC: 0.5 MG/DL (ref 0.1–1.5)
BUN SERPL-MCNC: 21 MG/DL (ref 8–22)
CALCIUM SERPL-MCNC: 8.7 MG/DL (ref 8.5–10.5)
CHLORIDE SERPL-SCNC: 92 MMOL/L (ref 96–112)
CO2 SERPL-SCNC: 34 MMOL/L (ref 20–33)
CREAT SERPL-MCNC: 0.97 MG/DL (ref 0.5–1.4)
GLOBULIN SER CALC-MCNC: 3.3 G/DL (ref 1.9–3.5)
GLUCOSE BLD-MCNC: 128 MG/DL (ref 65–99)
GLUCOSE BLD-MCNC: 131 MG/DL (ref 65–99)
GLUCOSE BLD-MCNC: 140 MG/DL (ref 65–99)
GLUCOSE BLD-MCNC: 147 MG/DL (ref 65–99)
GLUCOSE SERPL-MCNC: 132 MG/DL (ref 65–99)
POTASSIUM SERPL-SCNC: 4.3 MMOL/L (ref 3.6–5.5)
PROT SERPL-MCNC: 5.6 G/DL (ref 6–8.2)
SODIUM SERPL-SCNC: 130 MMOL/L (ref 135–145)

## 2020-11-30 PROCEDURE — 700102 HCHG RX REV CODE 250 W/ 637 OVERRIDE(OP): Performed by: STUDENT IN AN ORGANIZED HEALTH CARE EDUCATION/TRAINING PROGRAM

## 2020-11-30 PROCEDURE — 770001 HCHG ROOM/CARE - MED/SURG/GYN PRIV*

## 2020-11-30 PROCEDURE — 700105 HCHG RX REV CODE 258: Performed by: INTERNAL MEDICINE

## 2020-11-30 PROCEDURE — 302102 BAG OST N IMG 2.25IN 2PC (FECAL): Performed by: INTERNAL MEDICINE

## 2020-11-30 PROCEDURE — 700111 HCHG RX REV CODE 636 W/ 250 OVERRIDE (IP): Performed by: INTERNAL MEDICINE

## 2020-11-30 PROCEDURE — 700102 HCHG RX REV CODE 250 W/ 637 OVERRIDE(OP): Performed by: INTERNAL MEDICINE

## 2020-11-30 PROCEDURE — 97605 NEG PRS WND THER DME<=50SQCM: CPT

## 2020-11-30 PROCEDURE — 302146: Performed by: INTERNAL MEDICINE

## 2020-11-30 PROCEDURE — A9270 NON-COVERED ITEM OR SERVICE: HCPCS | Performed by: STUDENT IN AN ORGANIZED HEALTH CARE EDUCATION/TRAINING PROGRAM

## 2020-11-30 PROCEDURE — 05HC33Z INSERTION OF INFUSION DEVICE INTO LEFT BASILIC VEIN, PERCUTANEOUS APPROACH: ICD-10-PCS | Performed by: INTERNAL MEDICINE

## 2020-11-30 PROCEDURE — 302111 WAFER OST 2.25IN N IMG RD 2 PC (BARRIER): Performed by: INTERNAL MEDICINE

## 2020-11-30 PROCEDURE — A9270 NON-COVERED ITEM OR SERVICE: HCPCS | Performed by: PSYCHIATRY & NEUROLOGY

## 2020-11-30 PROCEDURE — 36410 VNPNXR 3YR/> PHY/QHP DX/THER: CPT

## 2020-11-30 PROCEDURE — A9270 NON-COVERED ITEM OR SERVICE: HCPCS | Performed by: INTERNAL MEDICINE

## 2020-11-30 PROCEDURE — 80053 COMPREHEN METABOLIC PANEL: CPT

## 2020-11-30 PROCEDURE — 99232 SBSQ HOSP IP/OBS MODERATE 35: CPT | Performed by: INTERNAL MEDICINE

## 2020-11-30 PROCEDURE — 99233 SBSQ HOSP IP/OBS HIGH 50: CPT | Performed by: INTERNAL MEDICINE

## 2020-11-30 PROCEDURE — 302098 PASTE RING (FLAT): Performed by: INTERNAL MEDICINE

## 2020-11-30 PROCEDURE — 82962 GLUCOSE BLOOD TEST: CPT | Mod: 91

## 2020-11-30 PROCEDURE — B54NZZA ULTRASONOGRAPHY OF LEFT UPPER EXTREMITY VEINS, GUIDANCE: ICD-10-PCS | Performed by: INTERNAL MEDICINE

## 2020-11-30 PROCEDURE — 700102 HCHG RX REV CODE 250 W/ 637 OVERRIDE(OP): Performed by: PSYCHIATRY & NEUROLOGY

## 2020-11-30 RX ORDER — FLUOXETINE 10 MG/1
10 CAPSULE ORAL DAILY
Status: DISCONTINUED | OUTPATIENT
Start: 2020-12-01 | End: 2020-12-01

## 2020-11-30 RX ADMIN — LISINOPRIL 20 MG: 20 TABLET ORAL at 05:06

## 2020-11-30 RX ADMIN — SPIRONOLACTONE 25 MG: 50 TABLET ORAL at 05:06

## 2020-11-30 RX ADMIN — KETOCONAZOLE: 20 CREAM TOPICAL at 05:08

## 2020-11-30 RX ADMIN — FAMOTIDINE 20 MG: 20 TABLET, FILM COATED ORAL at 05:06

## 2020-11-30 RX ADMIN — INSULIN GLARGINE 10 UNITS: 100 INJECTION, SOLUTION SUBCUTANEOUS at 05:23

## 2020-11-30 RX ADMIN — AMIODARONE HYDROCHLORIDE 200 MG: 200 TABLET ORAL at 05:06

## 2020-11-30 RX ADMIN — LEVOTHYROXINE SODIUM 112 MCG: 112 TABLET ORAL at 05:06

## 2020-11-30 RX ADMIN — Medication 400 MG: at 05:06

## 2020-11-30 RX ADMIN — HYDROCODONE BITARTRATE AND ACETAMINOPHEN 1 TABLET: 10; 325 TABLET ORAL at 10:12

## 2020-11-30 RX ADMIN — RISPERIDONE 1 MG: 1 TABLET ORAL at 18:16

## 2020-11-30 RX ADMIN — HYDROCODONE BITARTRATE AND ACETAMINOPHEN 1 TABLET: 10; 325 TABLET ORAL at 00:28

## 2020-11-30 RX ADMIN — HYDROCODONE BITARTRATE AND ACETAMINOPHEN 1 TABLET: 10; 325 TABLET ORAL at 18:16

## 2020-11-30 RX ADMIN — METOPROLOL SUCCINATE 75 MG: 50 TABLET, EXTENDED RELEASE ORAL at 05:05

## 2020-11-30 RX ADMIN — FLUCONAZOLE 400 MG: 200 TABLET ORAL at 05:05

## 2020-11-30 RX ADMIN — FUROSEMIDE 20 MG: 10 INJECTION, SOLUTION INTRAMUSCULAR; INTRAVENOUS at 17:59

## 2020-11-30 RX ADMIN — CETIRIZINE HYDROCHLORIDE 10 MG: 10 TABLET, FILM COATED ORAL at 05:06

## 2020-11-30 RX ADMIN — TRAZODONE HYDROCHLORIDE 50 MG: 50 TABLET ORAL at 20:50

## 2020-11-30 RX ADMIN — FUROSEMIDE 20 MG: 10 INJECTION, SOLUTION INTRAMUSCULAR; INTRAVENOUS at 05:20

## 2020-11-30 RX ADMIN — Medication 400 MG: at 18:00

## 2020-11-30 RX ADMIN — RISPERIDONE 0.5 MG: 1 TABLET ORAL at 05:07

## 2020-11-30 RX ADMIN — CEFTRIAXONE SODIUM 2 G: 2 INJECTION, POWDER, FOR SOLUTION INTRAMUSCULAR; INTRAVENOUS at 05:04

## 2020-11-30 RX ADMIN — METOPROLOL SUCCINATE 75 MG: 50 TABLET, EXTENDED RELEASE ORAL at 18:16

## 2020-11-30 RX ADMIN — MODAFINIL 100 MG: 100 TABLET ORAL at 05:06

## 2020-11-30 RX ADMIN — VANCOMYCIN HYDROCHLORIDE 1250 MG: 500 INJECTION, POWDER, LYOPHILIZED, FOR SOLUTION INTRAVENOUS at 07:53

## 2020-11-30 ASSESSMENT — ENCOUNTER SYMPTOMS
PSYCHIATRIC NEGATIVE: 1
DIARRHEA: 0
ABDOMINAL PAIN: 0
HEARTBURN: 0
NEUROLOGICAL NEGATIVE: 1
NAUSEA: 0
RESPIRATORY NEGATIVE: 1
ABDOMINAL PAIN: 1
VOMITING: 0
SHORTNESS OF BREATH: 0
CONSTITUTIONAL NEGATIVE: 1
MUSCULOSKELETAL NEGATIVE: 1
FEVER: 0
COUGH: 0
CHILLS: 0
CARDIOVASCULAR NEGATIVE: 1
MYALGIAS: 1

## 2020-11-30 ASSESSMENT — PATIENT HEALTH QUESTIONNAIRE - PHQ9
4. FEELING TIRED OR HAVING LITTLE ENERGY: NEARLY EVERY DAY
1. LITTLE INTEREST OR PLEASURE IN DOING THINGS: NEARLY EVERY DAY
SUM OF ALL RESPONSES TO PHQ QUESTIONS 1-9: 21
7. TROUBLE CONCENTRATING ON THINGS, SUCH AS READING THE NEWSPAPER OR WATCHING TELEVISION: MORE THAN HALF THE DAYS
6. FEELING BAD ABOUT YOURSELF - OR THAT YOU ARE A FAILURE OR HAVE LET YOURSELF OR YOUR FAMILY DOWN: NEARLY EVERY DAY
SUM OF ALL RESPONSES TO PHQ9 QUESTIONS 1 AND 2: 6
3. TROUBLE FALLING OR STAYING ASLEEP OR SLEEPING TOO MUCH: NEARLY EVERY DAY
2. FEELING DOWN, DEPRESSED, IRRITABLE, OR HOPELESS: NEARLY EVERY DAY
8. MOVING OR SPEAKING SO SLOWLY THAT OTHER PEOPLE COULD HAVE NOTICED. OR THE OPPOSITE, BEING SO FIGETY OR RESTLESS THAT YOU HAVE BEEN MOVING AROUND A LOT MORE THAN USUAL: NOT AT ALL
5. POOR APPETITE OR OVEREATING: SEVERAL DAYS
9. THOUGHTS THAT YOU WOULD BE BETTER OFF DEAD, OR OF HURTING YOURSELF: NEARLY EVERY DAY

## 2020-11-30 ASSESSMENT — PAIN DESCRIPTION - PAIN TYPE
TYPE: ACUTE PAIN;SURGICAL PAIN
TYPE: ACUTE PAIN
TYPE: ACUTE PAIN

## 2020-11-30 NOTE — WOUND TEAM
[facility]  In[facility] Services  Wound and Skin Care Progress note    Admission Date: 11/3/2020     Last order of IP CONSULT TO WOUND CARE was found on 11/16/2020 from Hospital Encounter on 11/3/2020     HPI, PMH, SH: Reviewed    Unit where seen by Wound Team: [id number]     WOUND CONSULT/FOLLOW UP RELATED TO:   Scheduled abdominal NPWT dressing/Ostomy appliance change    Self Report / Pain Level:  Attempted w/o pain medications, Received norco half way through dressing change.      OBJECTIVE:  Vac leaking and only suctioning at 100mmhg continuous.     WOUND TYPE, LOCATION, CHARACTERISTICS (Pressure Injuries: location, stage, POA or date identified)     Negative Pressure Wound Therapy 11/10/20 Surgical Abdomen Midline (Active)   Vacuum Serial Number [id number] 11/27/20 2100   NPWT Pump Mode / Pressure Setting Ulta;Continuous;125 mmHg    Dressing Type Medium;Black Foam (Regular)    Number of Foam Pieces Used 3    Canister Changed No    Output (mL) 75 mL    NEXT Dressing Change/Treatment Date 12/02/20    WOUND NURSE ONLY - Time Spent with Patient (mins) 120      Wound 11/10/20 Full Thickness Wound Abdomen Midline wound vac (Active)   Wound Image    11/30/20 0900   Site Assessment Clean;Dry;Intact;Early/partial granulation    Periwound Assessment Excoriated    Margins Attached edges;Defined edges    Closure Secondary intention    Drainage Amount Small    Drainage Description Serosanguineous    Treatments Cleansed;Site care    Wound Cleansing Approved Wound Cleanser    Periwound Protectant Skin Protectant Wipes to Periwound;Hydrocolloid;Paste Ring;Drape;Stoma Powder    Dressing Cleansing/Solutions Not Applicable    Dressing Options Wound Vac    Dressing Changed Changed    Dressing Status Clean;Dry;Intact    Dressing Change/Treatment Frequency Monday, Wednesday, Friday, and As Needed    NEXT Dressing Change/Treatment Date 12/02/20    NEXT Weekly Photo (Inpatient Only) 12/07/20    Number of Staples Removed 10      Non-staged Wound Description Full thickness    Wound Length (cm) SUPERIOR: 11 cm; INFERIOR: 2.2 cm    Wound Width (cm) SUPERIOR: 7 cm; INFERIOR: 2.5 cm     Wound Depth (cm) SUPERIOR: 3 cm; INFERIOR: 1.5 cm    Wound Surface Area (cm^2) 77 cm^2    Wound Volume (cm^3) 231 cm^3    Wound Healing % 69    Wound Bed Granulation (%) 80 %    Tunneling (cm) SUPERIOR: 5.3 cm    Tunneling Clock Position of Wound SUPERIOR: 12    Undermining (cm) 0 cm    Shape Oval x1, Round x1    Wound Odor None    Exposed Structures Adipose    WOUND NURSE ONLY - Time Spent with Patient (mins) 75    Wound 11/10/20 Full Thickness Wound Flank Left (Active)   Wound Image   11/27/20 1300   Site Assessment Clean;Red    Periwound Assessment Intact    Margins Defined edges;Unattached edges    Closure Adhesive bandage    Drainage Amount Scant    Drainage Description Serosanguineous    Treatments Cleansed;Site care    Wound Cleansing Approved Wound Cleanser    Periwound Protectant Skin Protectant Wipes to Periwound    Dressing Cleansing/Solutions Not Applicable    Dressing Options Silver Strip Packing;Silicone Adhesive Foam    Dressing Changed Changed    Dressing Status Clean;Dry;Intact    Dressing Change/Treatment Frequency Every Shift, and As Needed    NEXT Dressing Change/Treatment Date 12/01/20    NEXT Weekly Photo (Inpatient Only) 12/04/20    Non-staged Wound Description Full thickness    Wound Length (cm) 0.6 cm    Wound Width (cm) 0.6 cm    Wound Depth (cm) 2 cm    Wound Surface Area (cm^2) 0.36 cm^2    Wound Volume (cm^3) 0.72 cm^3    Wound Healing % 4    Tunneling (cm) 0 cm    Tunneling Clock Position of Wound 9    Shape 0    Wound Odor None    Exposed Structures VON    WOUND NURSE ONLY - Time Spent with Patient (mins) 30      Lab Values:    Lab Results   Component Value Date/Time    WBC 9.6 11/29/2020 01:25 AM    RBC 3.16 (L) 11/29/2020 01:25 AM    HEMOGLOBIN 8.2 (L) 11/29/2020 01:25 AM    HEMATOCRIT 26.8 (L) 11/29/2020 01:25 AM    CREACTPROT  7.52 (H) 11/29/2020 01:25 AM    SEDRATEWES 13 10/23/2018 08:02 AM    HBA1C 7.8 (H) 11/10/2020 03:31 AM      Culture Results show:  Recent Results (from the past 720 hour(s))   CULTURE WOUND W/ GRAM STAIN    Collection Time: 11/25/20  4:52 PM    Specimen: Wound   Result Value Ref Range    Significant Indicator POS (POS)     Source WND     Site Joya-splenic drain     Culture Result (A)      Growth noted after further incubation, see below for  organism identification.      Gram Stain Result Moderate WBCs.  Rare Gram positive cocci.       Culture Result Enterococcus faecalis  Rare growth   (A)        Susceptibility    Enterococcus faecalis - ISAMAR     Daptomycin 4 Sensitive mcg/mL     Ampicillin <=2 Sensitive mcg/mL     Gent Synergy <=500 Sensitive mcg/mL     Vancomycin 1 Sensitive mcg/mL     Penicillin 2 Sensitive mcg/mL       INTERVENTIONS BY WOUND TEAM:   Chart and images reviewed. Discussed with pt and bedside RN. L flank dressing dated the 27th removed. Wound cleansed with wound cleanser and gauze. Joya-wound prepped with no sting. A piece of plain strip packing (no silver strip packing available - packing was ordered STAT) was packed into wound and secured with an adhesive foam. Pts NPWT dressing removed, no retained foam. Wound cleansed with wound cleanser and gauze. Joya-wound excoriated. Measurements and pictures obtained. Joya-wound prepped with no sting skin barrier. A piece of folded gauze applied into umbilicus. 3 pieces of hydrocolloid applied over open areas. 1 paste ring applied around inferior border of superior wound. Mepitel one strips applied over staples. One piece of half thickness spiraled black foam into 1cm of the depth of 12 O'clock tunnel. 1 pieces of half thickness cut to fit black foam applied over superior wound to fill wound depth. One piece of half thickness spiraled black foam applied into inferior wound and bridged over sutures to superior wound. All exposed foam secured with drape. A  hole was cut in drape over superior wound and trac pad applied. Suction resumed at 125mmhg continuous no leaks identified. Pt then turned to the right side. Sacrococcygeal area assessed. Area is red but quick to swetha. No pressure injury noted an pt on low airloss bed. Ostomy appliance then changed (see note below).    Interdisciplinary consultation: Patient, Bedside RN (Connie), Wound RN (Delvis)    EVALUATION / RATIONALE FOR TREATMENT: Wound with early granular tissue. Tunnel noted at 12 O'clock. Joya-skin excoriated and thin hydrocolloid applied. Paste ring used to decrease amount of drape over joya-wound skin. NPWT continued to assist in granular tissue development while managing drainage.     Goals: Steady decrease in wound area and depth weekly.    NURSING PLAN OF CARE ORDERS (X):    Dressing changes: See Dressing Care orders: X  Skin care: See Skin Care orders: X  RN Prevention Protocol: X  Rectal tube care: See Rectal Tube Care orders:   Other orders:      WOUND TEAM PLAN OF CARE:   Dressing changes by wound team:                   Follow up 3 times weekly:                NPWT change 3 times weekly:  X - abdomen   Follow up 1-2 times weekly:    X -left flank   Follow up Bi-Monthly:                   Follow up as needed:       Other (explain):     Anticipated discharge plans:  LTACH:        SNF/Rehab:     X            Home Health Care:           Outpatient Wound Center:            Self Care:                [facility]  Inpatient Services  New Ostomy Management & Teaching     HPI:  Reviewed  PMH: Reviewed   SH: Reviewed        Objective: Leaking noted between barrier and pouch as if pouch was not fully attached. Peristomal skin slightly red.     Colostomy 11/10/20 Transverse LUQ (Active)   Wound Image   11/30/20 0900   Stomal Appliance Assessment Clean;Dry;Intact;Changed    Stoma Assessment Red    Stoma Shape Round    Stoma Size (in) 1.5    Peristomal Assessment Painful;Red;Clean    Mucocutaneous  "Junction Intact    Treatment Appliance Changed    Peristomal Protectant Paste Ring;No Sting Skin Prep    Stomal Appliance 2 1/4\" (57mm) CTF;Paste Ring, 2\"    Output (mL) 50 mL    Output Color Brown    WOUND RN ONLY - Stomal Appliance  2 Piece    Appliance (Pouch) # [id number]    Appliance Brand Diasy    Appliance Supplier [employer]    Secure Start completed Yes    Ostomy Care Resources Provided [facility] Tip Sheet    WOUND NURSE ONLY - Time Spent with Patient (mins) 480       Ostomy Appliance (type and size):  2 piece 2 3/4\" with paste ring, ordered 3 sets of 2 1/4\" for next change as stoma has decreased in size.      Interventions:  Previous appliance removed, cleansed skin with warm wash cloth, dried. New template made. Ostomy RN cut barrier to fit, trimming edge closest to abdominal wound. Peristomal skin crusted x1. This RN applied paste ring to barrier and then applied barrier to skin. Used friction to adhered barrier to skin. Pouch attached to barrier and pouch end closed.                 Pt education: Pt was able to perform some hands-on care today. Will require ongoing assistance     Evaluation:  Peristomal skin slightly red. Will need ongoing crusting. Pt unable to perform most ostomy care alone. Patient likely to discharge to SNF or Rehab. Stoma is viable and producing adequate amounts of stool.     Plan: Ostomy nurses to continue to follow for ostomy needs and teaching      Anticipated discharge needs: Supplies, supplier information, possible HH or outpatient ostomy clinic      "

## 2020-11-30 NOTE — PROGRESS NOTES
Infectious Disease Progress Note    Author: Cristy Holley M.D. Date & Time of service: 11/30/2020  12:43 PM    Chief Complaint:  Intrabdominal abscess and bacteremia     Interval History:   55 y.o. diabetic male admitted 11/3/2020.+ A. fib, CHD, and severe morbid obesity.  Seen by ID in the past for right foot abscess with cultures positive for MRSA and Proteus.  s/p GLF approximately 1 month ago and was admitted to [facility] with left upper quadrant pain.  CT at the time revealed a subcapsular splenic hematoma. Presented to ED complaining of abdominal pain.  11/9 AF WBC 9.4 Less pain in side but remains tender to touch. Denies SE Zyvox-counseled about sxs serotonin syndrome  11/11 T-max 99.7 WBC 15.4 patient underwent colectomy, colostomy placement and I&D of flank wound with wound VAC placement on 11/10/2020 by Dr. Desouza.  Postop course complicated by hypotension and transferred to the ICU.  On pressors.  Patient complaining of shortness of breath as well as lightheadedness.  Also has postop abdominal pain.  11/12 afebrile WBC 13.3.  Patient weaned off pressors overnight.  Operative cultures growing group D Enterococcus and yeast.  Patient feeling better today.  Improved shortness of breath.  Abdominal pain stable  11/13 afebrile WBC 13.8 patient continues to feel better.  He is inquiring when he can have food by mouth.  11/14 afebrile WBC 9.9.  Patient feeling a bit better today.  His diet has been slowly advanced.  He is however complaining of some sharp abdominal pain when he coughs  11/16  AF, O2 RA, abdominal pain and poor appetite.   11/17 AF, O2 1 L NC , WBC 19.1. Drain output 465 cc yesterday. Ongoing nausea, poor appetite and abdomen is quite tender on palpation.  Will broaden antibiotics.   11/18 AF, O2 1 L NC, WBC 18.1, Drain output- 610 cc. Patient still has abdominal pain and significant nausea.  Examined abdominal wound photos and discussed with wound care team.  He has a large wound  but clean appearing base with granulation tissue.  No obvious infection in the surrounding tissues.  Left side wound with packing in place, ongoing drainage but clearing .    AF, tachycardia and hypertensive , WBC 17.2, drain output-205 cc yesterday. C/o nausea and agree with plan to transition Flagyl to IV to see if this helps.     AF WBC 13.6 feels OK-abd pain controlled. Poor appetite and not eating much. Denies SE abx-nausea resolved   AF WBC 16 no new complaints-no new cough, dyspnea and abd pain the same. Appetite so-so.   AF WBC 13 CT reviewed H and H continues to decrease. Somnolent today-no acute events overnight   AF 11.8 had new drain placed-tolerated well-still poor appetite    AF WBC 11.4 cxs with enterococcus-tired today. No new complaints   AF WBC 9.6 c/o itching face and back. Pain controlled    AF PICC bandage itchy-VAC changed this am-pictures reviewed. No new complaints    Review of Systems:  Review of Systems   Constitutional: Negative for chills and fever.   Respiratory: Negative for cough and shortness of breath.    Gastrointestinal: Positive for abdominal pain. Negative for diarrhea, nausea and vomiting.   Musculoskeletal: Positive for joint pain and myalgias.   Skin: Positive for itching and rash.   All other systems reviewed and are negative.      Hemodynamics:  Temp (24hrs), Av.6 °C (97.8 °F), Min:36.5 °C (97.7 °F), Max:36.7 °C (98 °F)  Temperature: 36.5 °C (97.7 °F)  Pulse  Av.2  Min: 51  Max: 161   Blood Pressure: 120/87      Physical Exam:  Physical Exam  Vitals signs and nursing note reviewed.   Constitutional:       General: He is not in acute distress.     Appearance: He is obese. He is not ill-appearing, toxic-appearing or diaphoretic.   HENT:      Nose: No congestion or rhinorrhea.      Mouth/Throat:      Pharynx: No oropharyngeal exudate.   Eyes:      General: No scleral icterus.     Extraocular Movements: Extraocular movements  intact.      Pupils: Pupils are equal, round, and reactive to light.   Neck:      Musculoskeletal: No neck rigidity.   Cardiovascular:      Rate and Rhythm: Tachycardia present. Rhythm irregular.   Pulmonary:      Effort: Pulmonary effort is normal. No respiratory distress.      Breath sounds: No stridor. No wheezing.      Comments: Line right chest nontender-excoriations  Abdominal:      General: There is no distension.      Palpations: Abdomen is soft.      Tenderness: There is abdominal tenderness. There is no guarding.      Comments: Wound VAC in place, no sign infection in surrounding tissue-measure 11 X7 X 3 cm  Ostomy  Drain serosanguinous   Musculoskeletal:      Right lower leg: Edema present.      Left lower leg: Edema present.   Lymphadenopathy:      Cervical: No cervical adenopathy.   Skin:     General: Skin is warm.      Coloration: Skin is not jaundiced.      Findings: Bruising and rash present.      Comments: Rash on face-not on chest or abd. Macular blanching  Unable to visualize back   Neurological:      General: No focal deficit present.      Mental Status: He is alert and oriented to person, place, and time.   Psychiatric:         Mood and Affect: Mood normal.         Behavior: Behavior normal.         Meds:    Current Facility-Administered Medications:   •  ketoconazole  •  furosemide  •  acetaminophen  •  vancomycin  •  diphenhydrAMINE  •  metoclopramide  •  risperiDONE  •  risperiDONE  •  magnesium oxide  •  modafinil  •  levothyroxine  •  HYDROcodone/acetaminophen  •  prochlorperazine  •  metoprolol SR  •  lisinopril  •  amiodarone  •  traZODone  •  Metoprolol Tartrate  •  Respiratory Therapy Consult  •  MD Alert...Vancomycin per Pharmacy  •  cefTRIAXone (ROCEPHIN) IV  •  spironolactone  •  cetirizine  •  famotidine  •  fluconazole  •  insulin glargine  •  insulin regular **AND** POC Blood Glucose **AND** NOTIFY MD and PharmD **AND** glucose **AND** dextrose 50%  •  albuterol    Labs:  Recent  Labs     11/29/20  0125   WBC 9.6   RBC 3.16*   HEMOGLOBIN 8.2*   HEMATOCRIT 26.8*   MCV 84.8   MCH 25.9*   RDW 54.6*   PLATELETCT 272   MPV 10.6   NEUTSPOLYS 62.40   LYMPHOCYTES 22.90   MONOCYTES 8.30   EOSINOPHILS 5.10   BASOPHILS 0.80     Recent Labs     11/28/20  0400 11/29/20  0125 11/30/20  0525   SODIUM 134* 135 130*   POTASSIUM 4.7 4.5 4.3   CHLORIDE 96 96 92*   CO2 32 34* 34*   GLUCOSE 140* 141* 132*   BUN 28* 25* 21     Recent Labs     11/28/20  0400 11/29/20  0125 11/30/20  0525   ALBUMIN 2.2* 2.3* 2.3*   TBILIRUBIN 0.4 0.4 0.5   ALKPHOSPHAT 117* 149* 137*   TOTPROTEIN 5.2* 5.4* 5.6*   ALTSGPT 7 8 10   ASTSGOT 16 18 19   CREATININE 0.97 0.96 0.97       Imaging:  Ct-abdomen-pelvis W/o    Result Date: 11/17/2020 11/17/2020 1:51 PM HISTORY/REASON FOR EXAM:  Nausea/vomiting; elevated WBC; R/O abscess. TECHNIQUE/EXAM DESCRIPTION: CT scan of the abdomen and pelvis without contrast. Noncontrast helical scanning was obtained from the diaphragmatic domes through the pubic symphysis. Low dose optimization technique was utilized for this CT exam including automated exposure control and adjustment of the mA and/or kV according to patient size. COMPARISON: 11/8/2020. FINDINGS: There is a moderate left and small right pleural effusion with overlying atelectasis/consolidation. Trace free air is seen in the abdomen. Evaluation of parenchymal and vascular structures is limited by the lack of intravenous contrast. The liver appears  unremarkable. There are gallstones within the gallbladder. There is hypodensity along the anterior aspect of the spleen measuring approximately 4.3 x 2.9 cm. No adrenal mass is identified. There is right renal atrophy with cortical scarring. There is no  evidence of hydronephrosis. Pancreas appears unremarkable. Aorta is not aneurysmal. There are small inguinal, retroperitoneal and mesenteric lymph nodes. There is no evidence of bowel obstruction. Visualized appendix appears unremarkable.  There is a left abdominal stoma. There is parastomal density measuring up to 2.8 x 2.1 cm likely representing a postsurgical hematoma. There is a small amount of free fluid in the abdomen and pelvis. Bladder is mildly distended. There is a surgical drain in the left abdomen. There is third spacing. Degenerative changes are seen in the spine. Compression deformities of T11 and T12 are again noted. Bones are demineralized.     Postsurgical changes with a left abdominal stoma. Parastomal densities may be related to postsurgical hematoma measuring up to 2.8 x 2.1 cm. Small amount of free fluid in the abdomen and pelvis. Collection along the anterior spleen measures approximately 2.9 x 4.3 cm is decreased in size compared to the prior examination. Evaluation is limited by lack of intravenous contrast. Third spacing. Trace free intraperitoneal air is likely related to recent surgery. Moderate left and small right pleural effusions with overlying atelectasis/consolidation. Cortical scarring of the right kidney. Cholelithiasis.     Ct-abdomen-pelvis With    Result Date: 11/9/2020 11/8/2020 11:34 PM HISTORY/REASON FOR EXAM:  Abdominal infection suspected; Has perisplenic abscess with possible connection to bowel TECHNIQUE/EXAM DESCRIPTION: CT of the abdomen and pelvis with contrast. Contrast-enhanced helical scanning was obtained from the diaphragmatic domes through the pubic symphysis following the bolus administration of 100 mL of nonionic contrast (Omnipaque 350) without complication. Low dose optimization technique was utilized for this CT exam including automated exposure control and adjustment of the mA and/or kV according to patient size. COMPARISON: 11/3/2020 CT FINDINGS: The visualized lung bases show diminished small left pleural fluid that layers dependently. There is similar middle and left lower lobe atelectasis . Similar multichamber cardiac enlargement CT Abdomen: There is a new pigtail catheter in the  anterior perisplenic fluid collection. The collection does diminish in size now measuring 58 x 33 from 79 x 40 mm. There is still gas within it and it is in direct contact with the splenic flexure of the colon. A small amount of gas is also seen in the more superficial soft tissues inferior to the pigtail catheter which crosses the ninth intercostal space No new abscess is detected Contrast is present in the small bowel but not yet in the transverse colon. No contrast extravasation is seen. No abnormal bowel dilatation The pancreas, adrenal glands and kidneys enhance normally. Mild hepatosplenomegaly Some low-density in the gallbladder likely indicating cholelithiasis There is no lymphadenopathy or aneurysmal change of the aorta. Compression deformities in the spine are again seen with subacute fractures resulting in anterior wedging at T11 and T12 There is diffuse fat stranding indicating anasarca CT Pelvis: Visualized pelvic structures are unremarkable. Prostate is within normal limits for age. No lymphadenopathy. There is no free air or free fluid.     Interval decrease in size of perisplenic abscess. Pigtail catheter now in place Abscess has likely fistulous communication with the abutting splenic flexure of the colon. Colon is otherwise normal in appearance Mild hepatosplenomegaly Decreasing small left pleural effusion Subacute anterior wedge compression fractures in the caudal thoracic spine    Ct-abdomen-pelvis With    Addendum Date: 11/5/2020    Addendum: A prior noncontrast CT of the abdomen and pelvis from [facility] dated 10/19/2020 was made available for comparison. On the prior study a perisplenic/subcapsular fluid collection was seen however did not contain air at that time and there was no discrete evidence of communication with the transverse colon. Communication with splenic flexure of the colon and air within the collection are new from the prior study.    Result Date:  11/5/2020  11/3/2020 4:02 PM HISTORY/REASON FOR EXAM:  Abd pain, unspecified; IV contrast only. TECHNIQUE/EXAM DESCRIPTION:   CT scan of the abdomen and pelvis with contrast. Contrast-enhanced helical scanning was obtained from the diaphragmatic domes through the pubic symphysis following the bolus administration of nonionic contrast without complication. 100 mL of Omnipaque 350 nonionic contrast was administered without complication. Low dose optimization technique was utilized for this CT exam including automated exposure control and adjustment of the mA and/or kV according to patient size. COMPARISON: 9/26/2018 FINDINGS: Chest Base: Small left pleural effusion with mild bibasilar atelectasis. Heart is mildly enlarged. Liver:  Diffuse hypoattenuation of the liver suggesting fatty infiltration. Moderately enlarged. Gallbladder: Cholelithiasis. Biliary tract: Nondilated. Pancreas: Normal. Spleen: There is a rim-enhancing perisplenic fluid collection which contains air and which communicates with the splenic flexure of the colon. This most likely represents an abscess and measures about 8.7 x 7.9 cm. The anterior aspect of the spleen is nonenhancing with irregular margins could be related to infection of splenic parenchyma or infarction. Adrenals: Normal. Kidneys and Collecting Systems:  There is mild right renal cortical scarring. No hydronephrosis. No renal mass. Gastrointestinal tract:  No bowel obstruction. The appendix is normal. Peritoneum: No free air or free fluid. Reproductive organs:  Normal. Bladder:  Normal. Vessels:  Normal caliber. Lymph  Nodes:  No lymphadenopathy. Abdominal wall: Within normal limits. Bones:  Age indeterminate possibly subacute to chronic T11 and T12 superior endplate compression fractures.     1.  Peripherally enhancing perisplenic fluid collection which communicates with the splenic flexure of the colon and which contains foci of air is most consistent with an abscess/infected fluid  collection. There is nonenhancement of the anterior aspect of the spleen suggesting infection/infarction. Surgical consultation is recommended. 2.  Hepatomegaly and hepatic steatosis. 3.  Cholelithiasis. 4.  Right renal cortical scarring. No hydronephrosis. 5.  Small left pleural effusion. Mild bibasilar atelectasis. 6.  Subacute to chronic appearing T11 and T12 superior endplate compression fractures. These findings were discussed with SVETLANA PAREDES on 11/3/2020 4:49 PM.     Ct-drain-peritoneal    Result Date: 11/4/2020 11/4/2020 5:19 PM HISTORY/REASON FOR EXAM:  Joya Splenic Fluid Collection TECHNIQUE/EXAM DESCRIPTION: LUQ parasplenic abscess drainage with CT guidance. Low dose optimization technique was utilized for this CT exam including automated exposure control and adjustment of the mA and/or kV according to patient size. PROCEDURE: Informed consent was obtained. SEDATION: Moderate sedation was provided. Pulse oximetry and continuous cardiac monitoring by the nurse was performed throughout the exam. Intraservice time was 30 minutes. Localizing CT images were obtained with the patient in supine position. The skin was prepped with Betadine and draped in a sterile fashion. Following local anesthesia with 1% Lidocaine, a 17 G guiding needle was placed and needle path confirmed with CT. An Amplatz guidewire was placed and following serial tract dilatation, a 10 Wolof pigtail locking catheter was placed. A specimen was collected and submitted for culture and sensitivity and Gram stain. Total of 6 mL of Brownish fluid was drained. The catheter was secured to the skin and connected to suction bulb drainage. Final CT images were obtained documenting catheter position. The patient tolerated the procedure well with no evidence of complication. COMPARISON: CT scan abdomen and pelvis 11/3/2020 FINDINGS: The final CT images show satisfactory catheter position within the target collection.     1.  CT guided  perisplenic abscess catheter drainage. 2.  The current plan is to obtain a follow-up CT in 5-7 days..    [id number] View    Result Date: 11/20/2020 11/20/2020 2:00 PM HISTORY/REASON FOR EXAM:  Abdominal Pain. TECHNIQUE/EXAM DESCRIPTION AND NUMBER OF VIEWS:  1 view(s) of the abdomen. COMPARISON: None FINDINGS: No evidence for small bowel obstruction. Surgical clips present in the mid abdomen. LEFT mid abdominal stoma noted. Surgical drain present in the LEFT lateral abdomen. Catheter projects of the RIGHT midabdomen, possibly wound VAC. Degenerative change of lumbar spine.     1.  No evidence of bowel obstruction. 2.  Postoperative changes as described.    Dx-chest-portable (1 View)    Result Date: 11/17/2020 11/17/2020 9:19 AM HISTORY/REASON FOR EXAM: Elevated white blood cell count. TECHNIQUE/EXAM DESCRIPTION AND NUMBER OF VIEWS: Single portable view of the chest. COMPARISON: 11/10/2020 FINDINGS: There are bilateral interstitial and airspace infiltrates. There is small bilateral pleural effusion. The heart is enlarged. Right subclavian central line is again seen.     1.  Bilateral airspace and interstitial infiltrates. 2.  Cardiomegaly.    Dx-chest-portable (1 View)    Result Date: 11/10/2020  11/10/2020 12:29 PM HISTORY/REASON FOR EXAM: new central line. TECHNIQUE/EXAM DESCRIPTION AND NUMBER OF VIEWS: Single AP view of the chest. COMPARISON: November 3 FINDINGS: Hardware: ] Tip overlies the cavoatrial junction Lungs: Linear middle lobe and left basilar opacity is seen. Volumes are low Pleura:  No pneumothorax is identified Heart and mediastinum: There is stable cardiac silhouette enlargement.     No pneumothorax identified following right subclavian line placement Worsening atelectasis with consolidation at the bases not excluded Stable cardiac silhouette enlargement    Dx-chest-portable (1 View)    Result Date: 11/3/2020  11/3/2020 3:41 PM HISTORY/REASON FOR EXAM:  Chest Pain. TECHNIQUE/EXAM DESCRIPTION AND  NUMBER OF VIEWS: Single portable view of the chest. COMPARISON: September 10, 2020 FINDINGS: The cardiac size is enlarged. There is interstitial prominence. Lung aeration has improved. No pneumothorax. No obvious pleural effusion.     Slight interval interval improvement in congestive heart failure.    Us-renal    Result Date: 11/11/2020 11/11/2020 6:21 PM HISTORY/REASON FOR EXAM:  Abnormal Labs TECHNIQUE/EXAM DESCRIPTION: Renal ultrasound. COMPARISON:  None FINDINGS: The right kidney measures 9.39 cm. The left kidney measures 9.54 cm. There is no hydronephrosis. There are no abnormal calcifications. The bladder not fully the time of imaging.      Examination was limited due to poor sound penetration related to body habitus. Grossly normal appearance of the kidneys.    Ec-echocardiogram Complete W/ Cont    Result Date: 11/11/2020  Transthoracic Echo Report Echocardiography Laboratory CONCLUSIONS Normal left ventricular chamber size. Moderately reduced left ventricular systolic function. Left ventricular ejection fraction is visually estimated to be 30-40%; variable related to atrial fibrillation. Mild mitral regurgitation. Moderately dilated right ventricle. Reduced right ventricular systolic function. Right heart pressures are normal. No prior study is available for comparison. LV EF:        % FINDINGS Left Ventricle 3 mL of contrast was administered. Contrast was used to enhance visualization of the endocardial border. Existing IV was used. Mild concentric left ventricular hypertrophy. Normal left ventricular chamber size.  Moderately reduced left ventricular systolic function. Left ventricular ejection fraction is visually estimated to be 30-40%. Diastolic function is difficult to assess with atrial fibrillation. Right Ventricle Moderately dilated right ventricle. Reduced right ventricular systolic function. Right Atrium Enlarged right atrium. Normal inferior vena cava size and inspiratory collapse. Left Atrium  Normal left atrial size. Mitral Valve Structurally normal mitral valve. Mild mitral regurgitation. No mitral stenosis. Aortic Valve Tricuspid aortic valve. Structurally normal aortic valve. No stenosis or regurgitation seen. Tricuspid Valve Structurally normal tricuspid valve. Mild tricuspid regurgitation. Right atrial pressure is estimated to be 3 mmHg. Estimated right ventricular systolic pressure  is 25 mmHg. No tricuspid stenosis. Right heart pressures are normal. Pulmonic Valve Structurally normal pulmonic valve without significant stenosis or regurgitation. Pericardium Normal pericardium without effusion. Aorta The aortic root is normal.  Ascending aorta diameter is 3.6 cm. Germain Pacheco M.D. (Electronically Signed) Final Date:     11 November 2020                 12:38 Amended:        11 November 2020 12:40    CT scan 11/24  CT Abdomen:  The liver, adrenal glands, and pancreas are unremarkable.     There are multiple nitrogen-containing calculi in the gallbladder.     Hypodense collection in the anterior spleen has increased in size to approximately 5.8 x 4.6 cm, previously 4.2 x 2.9 cm. There is stranding in the adjacent fat.     The kidneys enhance symmetrically. There is right renal scarring.     Left abdominal colostomy is again noted with small adjacent hematomas in the subcutaneous fat. There is residual contrast in the distal colon. No contrast extravasation or pneumoperitoneum is appreciated.     There are calcifications in the arteries.     Micro:  Results     Procedure Component Value Units Date/Time    CULTURE WOUND W/ GRAM STAIN [[id number]]  (Abnormal)  (Susceptibility) Collected: 11/25/20 1652    Order Status: Completed Specimen: Wound Updated: 11/28/20 1602     Significant Indicator POS     Source WND     Site Joya-splenic drain     Culture Result Growth noted after further incubation, see below for  organism identification.       Gram Stain Result Moderate WBCs.  Rare Gram positive  cocci.       Culture Result Enterococcus faecalis  Rare growth      Narrative:      Collected By:[id number] ERLIN SPARROW  Joya-splenic collection.  Collected By:[id number] ERLIN SPARROW    Susceptibility     Enterococcus faecalis (1)     Antibiotic Interpretation Microscan Method Status    Daptomycin Sensitive 4 mcg/mL ISAMAR Final    Ampicillin Sensitive <=2 mcg/mL ISAMAR Final    Gent Synergy Sensitive <=500 mcg/mL ISAMAR Final    Vancomycin Sensitive 1 mcg/mL ISAAMR Final    Penicillin Sensitive 2 mcg/mL ISAMAR Final                   GRAM STAIN [[id number]] Collected: 11/25/20 1652    Order Status: Completed Specimen: Wound Updated: 11/26/20 0944     Significant Indicator .     Source WND     Site Joya-splenic drain     Gram Stain Result Moderate WBCs.  Rare Gram positive cocci.      Narrative:      Collected By:[id number] ERLIN SPARROW  Joya-splenic collection.  Collected By:[id number] ERLIN SPARROW    FLUID CULTURE W/GRAM STAIN [[id number]] Collected: 11/25/20 1705    Order Status: Sent Specimen: Body Fluid from Peritoneal Fluid     FLUID CULTURE W/GRAM STAIN [[id number]] Collected: 11/25/20 1625    Order Status: Canceled Specimen: Other from Peritoneal Fluid           Assessment:  Active Hospital Problems    Diagnosis   • *Spontaneous perforation of colon ([facility]ly [facility]) [K63.1]   • Leucocytosis [D72.829]   • Schizophrenia (Formerly [facility]) [F20.9]   • Sepsis (Formerly [facility]) [A41.9]   • Elevated digoxin level [R78.89]   • SRUTHI (acute kidney injury) (Formerly [facility]) [N17.9]   • Adrenal insufficiency (Formerly [facility]) [E27.40]   • LV dysfunction [I51.9]   • Paroxysmal A-fib with RVR ([facility]ly [facility]) [I48.0]   • Type 2 diabetes mellitus, without long-term current use of insulin, with peripheral neuropathy ([facility]ly [facility]) [E11.9]   • Hypothyroidism [E03.9]   • Prolonged QT interval [R94.31]     Hospital Course:   CT scan on 11/3 revealed a peripherally enhancing perisplenic fluid collection measuring 8.7 x 7.9 cm, communicating with the splenic flexure of the colon and contained foci of air consistent  with abscess/infected fluid.  Also noted nonenhancing of the anterior aspect of the spleen suggesting infection/infarction.    Status post surgery on 11/10/2020    Leukocytosis -Decreased     Acute kidney injury- improved   Renal ultrasound-unremarkable  Nephrology following  Avoid nephrotoxins       Perisplenic abscess with possible communication to the bowel  Left upper quadrant abscess  s/p ground-level fall complicated by a subcapsular splenic hematoma  Afebrile  Splenic enhancement on CT, suggesting infection or infarction  -s/p drainage 11/4 and peritoneal fluid Efaecalis, ampicillin sensitive and C albicans  S/p segmental colectomy, colostomy, irrigation and debridement of flank wound and wound VAC placement on 11/10/2020 by Dr. Desouza.  Patient found to have a left upper quadrant abscess inferior to the spleen with a large bowel wall defect.  Also noted to have an abdominal wall abscess.  Op cultures (colon perforation) - E faecalis and Candida albicans.    -CT abdomen pelvis without on 11/17-hypodensity along anterior aspect of the spleen measuring 4.3 x 2.9 cm.  Parastomal density measuring two-point by 2.1 cm likely representing postsurgical hematoma.  This is decreased in size from prior.  Small amount of free fluid in the abdomen pelvis.  -Drain removed on 11/21  -CT 11/24 with enlarging anterior splenic fluid collection  Deemed nonsurical-  -11/25 new drain placed by IR. Culture + Efaecalis  -Wound granulating     Continue vancomycin to treat the Enterococcus (PCN allergic) for now-(prior Efaecalis)-monitor renal function  Continue ceftriaxone to provide gram-negative coverage.    Continue fluconazole 400 mg daily  for the Candida  Anticipate 10 day course from date of last surgery/source control  Tentative stop date 12/5/2020     Rash, neot improved  Atypical for drug rash  Trial Nizoral to face daily for 7-10 days  Encourage OOB    Diabetes mellitus  Keep BS under 150 to help control current  infection  -140        Antibiotic allergies: Daptomycin rash, penicillins rash and hives, tolerates cephalosporins-discussed allergies and he states that he has developed significant rash and hives with penicillins several times, most recently a year and a half ago- will avoid penicillins

## 2020-11-30 NOTE — PROGRESS NOTES
Pt is A&O 4  Pain well controlled with current regimen   Denies nausea  Tolerating a diabetic GI soft diet   Incision to midline abdomen, has wound vac in place  BETTE to LMQ, serosanguinous drainage, to bulb suction.  Flushed once per MD order  + Voids  + flatus per ostomy  + BM per ostomy, loose brown output  Up max assist  Bed alarm on, pt moderate fall risk per juli chávez  Reviewed plan of care with patient, bed in lowest position and locked, pt resting comfortably now, call light within reach, all needs met at this time. Interventions will be executed per plan of care

## 2020-11-30 NOTE — PROGRESS NOTES
Hospital Medicine Daily Progress Note    Date of Service  11/30/2020    Chief Complaint  55 y.o. male admitted 11/3/2020 with abdominal pain    Hospital Course    55-year-old male with history of schizophrenia, diabetes, heart failure with ejection fraction 30% and atrial fibrillation presented 11/3 with left upper quadrant pain, initially the patient had perisplenic hematoma after fall however he developed fever and severe pain, CT scan showed perisplenic abscess with possible fistula and perforation, patient surgery for colostomy and for bowel perforation, blood culture on 11/31 showed Streptococcus sepsis,  A new drain was placed on 11/25, drain fluid culture from the abscess showed Streptococcus and yeast Candida, patient was evaluated by infectious disease and he was treated with ceftriaxone, fluconazole and later added vancomycin to treat Enterococcus PCN allergy stop date will be 12/5/2020..     Interval Problem Update  -Evaluated examined the patient at bedside, denied any new symptoms.  -The patient has a chronic dyspnea on exertion, he is on room air.  -Labs are reviewed, improving on leukocytosis and kidney function.  -The patient is ready for discharge/placement  -Try to insert midline and remove the central line    Consultants/Specialty  General surgeon  Infectious disease    Code Status  Full Code    Disposition  LTAC or SNF  Medically cleared for placement.      Review of Systems  Review of Systems   Constitutional: Negative.    HENT: Negative.    Respiratory: Negative.    Cardiovascular: Negative.    Gastrointestinal: Negative for abdominal pain, diarrhea, heartburn, nausea and vomiting.   Genitourinary: Negative.    Musculoskeletal: Negative.    Skin: Negative.    Neurological: Negative.    Psychiatric/Behavioral: Negative.         Physical Exam  Temp:  [36.5 °C (97.7 °F)-36.7 °C (98 °F)] 36.5 °C (97.7 °F)  Pulse:  [] 67  Resp:  [16-19] 18  BP: (108-120)/(64-87) 120/87  SpO2:  [91 %-96 %] 96  %    Physical Exam  Constitutional:       Appearance: He is obese. He is not ill-appearing.   HENT:      Head: Normocephalic and atraumatic.   Eyes:      General: No scleral icterus.  Abdominal:      General: There is no distension.      Palpations: There is no mass.      Tenderness: There is abdominal tenderness. There is no guarding.      Comments: Colostomy and a drain in place   Musculoskeletal:         General: No swelling or deformity.      Right lower leg: No edema.      Left lower leg: No edema.   Skin:     General: Skin is warm.      Coloration: Skin is not jaundiced or pale.      Findings: No bruising.   Neurological:      General: No focal deficit present.      Mental Status: He is alert and oriented to person, place, and time. Mental status is at baseline.      Cranial Nerves: No cranial nerve deficit.      Motor: No weakness.         Fluids    Intake/Output Summary (Last 24 hours) at 11/30/2020 1032  Last data filed at 11/30/2020 0900  Gross per 24 hour   Intake 590 ml   Output 1115 ml   Net -525 ml       Laboratory  Recent Labs     11/29/20  0125   WBC 9.6   RBC 3.16*   HEMOGLOBIN 8.2*   HEMATOCRIT 26.8*   MCV 84.8   MCH 25.9*   MCHC 30.6*   RDW 54.6*   PLATELETCT 272   MPV 10.6     Recent Labs     11/28/20  0400 11/29/20  0125 11/30/20  0525   SODIUM 134* 135 130*   POTASSIUM 4.7 4.5 4.3   CHLORIDE 96 96 92*   CO2 32 34* 34*   GLUCOSE 140* 141* 132*   BUN 28* 25* 21   CREATININE 0.97 0.96 0.97   CALCIUM 8.4* 8.7 8.7                   Imaging  CT-DRAIN-PERITONEAL   Final Result      Successful splenic abscess drainage tube placement.      Plan: Twice daily flushes with 10 mL of sterile saline. Monitor outputs.         CT-ABDOMEN-PELVIS WITH   Final Result      1.  Intraparenchymal collection in the anterior aspect of the spleen has increased in size from the prior exam.      2.  Small amount of free fluid in the pelvis has increased from the prior exam.      3.  Small bilateral pleural effusions layer  posteriorly, left greater than right, slightly decreased from the prior exam. Adjacent airspace disease is likely atelectasis.      4.  Cholelithiasis.      5.  Right renal scarring.      YC-[id number]-2 VIEW   Final Result      1.  No evidence of bowel obstruction.   2.  Postoperative changes as described.      CT-ABDOMEN-PELVIS W/O   Final Result      Postsurgical changes with a left abdominal stoma. Parastomal densities may be related to postsurgical hematoma measuring up to 2.8 x 2.1 cm.      Small amount of free fluid in the abdomen and pelvis.      Collection along the anterior spleen measures approximately 2.9 x 4.3 cm is decreased in size compared to the prior examination. Evaluation is limited by lack of intravenous contrast.      Third spacing.      Trace free intraperitoneal air is likely related to recent surgery.      Moderate left and small right pleural effusions with overlying atelectasis/consolidation.      Cortical scarring of the right kidney.      Cholelithiasis.         DX-CHEST-PORTABLE (1 VIEW)   Final Result      1.  Bilateral airspace and interstitial infiltrates.      2.  Cardiomegaly.      US-RENAL   Final Result    Examination was limited due to poor sound penetration related to body habitus.   Grossly normal appearance of the kidneys.      EC-ECHOCARDIOGRAM COMPLETE W/ CONT   Final Result      DX-CHEST-PORTABLE (1 VIEW)   Final Result      No pneumothorax identified following right subclavian line placement      Worsening atelectasis with consolidation at the bases not excluded      Stable cardiac silhouette enlargement      CT-ABDOMEN-PELVIS WITH   Final Result      Interval decrease in size of perisplenic abscess. Pigtail catheter now in place      Abscess has likely fistulous communication with the abutting splenic flexure of the colon. Colon is otherwise normal in appearance      Mild hepatosplenomegaly      Decreasing small left pleural effusion      Subacute anterior wedge compression  fractures in the caudal thoracic spine      CT-DRAIN-PERITONEAL   Final Result      1.  CT guided perisplenic abscess catheter drainage.   2.  The current plan is to obtain a follow-up CT in 5-7 days..      OUTSIDE IMAGES-CT CHEST   Final Result      OUTSIDE IMAGES-CT ABDOMEN /PELVIS   Final Result      CT-ABDOMEN-PELVIS WITH   Final Result   Addendum 1 of 1   Addendum:   A prior noncontrast CT of the abdomen and pelvis from [facility] dated 10/19/2020 was made available for comparison. On the    prior study a perisplenic/subcapsular fluid collection was seen however    did not contain air at that time and    there was no discrete evidence of communication with the transverse colon.    Communication with splenic flexure of the colon and air within the    collection are new from the prior study.      Final      DX-CHEST-PORTABLE (1 VIEW)   Final Result      Slight interval interval improvement in congestive heart failure.      IR-MIDLINE CATHETER INSERTION WO GUIDANCE > AGE 3    (Results Pending)        Assessment/Plan  * Spontaneous perforation of colon (HCC)- (present on admission)  Assessment & Plan  Admitted with LUQ abscess and perforation at splenic flexure   Segmental colectomy, colostomy, Patti pouch, I & D of abdominal wall abscess  Wound cultures: enterococcus and candida  And blood culture showed strep  Patient was evaluated and followed by ID  To continue ceftriaxone, fluconazole and vancomycin for 10 days after last surgery, last dose will be around 12/5  We might need to repeat images before discontinue antibiotics to make sure resolving on his abscess    Sepsis (HCC)  Assessment & Plan  Secondary to perforation of colon,  Wound cultures: enterococcus & candida.  Started on Linezolid and Fluconazole. But his QTc is prolonged.  - DCed linezolide and started on vancomycin.  - continue Fluconazole  - DC Zyvox due to prolonged QTc  - continue  vancomycin (11/17)  - continue  ceftriaxone 11/17and   - Dced Flagyl on 11/21 due to nausea - nausea improved      Leucocytosis  Assessment & Plan  Due to intra-abdominal infection and abscess   continue antibiotics  Labs daily  Improving      Heart failure (HCC)- (present on admission)  Assessment & Plan  History of ischemic cardiomyopathy with ejection fraction 30/40%  With right ventricle dysfunction  Continue metoprolol and lisinopril and Aldactone  Continue amiodarone for atrial fibrillation  Lasix as needed  Weight daily    Paroxysmal A-fib with RVR ([facility])- (present on admission)  Assessment & Plan  Seen by cardiac electrophysiologist in 2019 and deemed not to be good candidate for anticoagulation due to frequent falls 2/2 orthostatic hypotension  CHADSVASC of 3   He was given digoxin however discontinued due to toxicity  Heart rate is controlled  Continue metoprolol XL 75 mg twice daily and amiodarone 20 mg daily  Continue holding anticoagulation at this time for possible surgery  To follow-up with cardiologist as outpatient    Prolonged QT interval  Assessment & Plan  Patient's QTc on admission 477 increased to 535 with Linezolid, zofran, zoloft, olanzapine.  DCed Linezolid, zofran, olanzapine & zoloft  QTc improved from 535 to 425 to 473  Seen by psychiatry for medication recommendations started on resperidone and modafinil.  - avoid QT prolonging medication.    Hypothyroidism  Assessment & Plan  Obese male   TSH 16.0 and T4 1.14  start on Levothyroxine 112mcg daily.  Repeat thyroid function after 6 to 8 weeks    Schizophrenia ([facility])  Assessment & Plan  History of schizophrenia on olanzapine and sertraline.  Developed hallucination during this hospitalization, also had delayed prolonged QTC  Evaluated by psychiatrist and started with risperidone and modafinil, discontinue Zoloft and olanzapine  Stable at this time continue monitoring and follow-up with psychiatrist.      Elevated digoxin level  Assessment & Plan  H/o N/v and visual  hallucinations including yellow halos, circles and squares  AFTER STARTING digoxin and 11/14 digoxin level 1.5 & on 11/17 Repeat digoxin level > 5,   & 0.8  Resolved  Keep holding digoxin    SRUTHI (acute kidney injury) (HCC)  Assessment & Plan  Resolved    Type 2 diabetes mellitus, without long-term current use of insulin, with peripheral neuropathy (HCC)- (present on admission)  Assessment & Plan  Patient states blood sugar well controlled at home with metformin.   Last a1c of 7.8 on 11/10/20   - continue Lantus 10U daily  - Sliding scale insulin with hypoglycemia protocol  - Ensure strict glucose control          VTE prophylaxis: lovenox

## 2020-11-30 NOTE — DISCHARGE PLANNING
Agency/Facility Name: [facility]  Spoke To: Admissions  Outcome: Facility states they do not have referral for pt. This [facility] rightfaxed referral @[phone]     @[phone]  Agency/Facility Name: [facility]  Spoke To: Cammie  Outcome: Facility is running auth now and will call [facility] back

## 2020-11-30 NOTE — PROCEDURES
Vascular Access Team    Date of Insertion: 11/30/20  Arm Circumference: 38  Internal length: 15  External Length: 0  Vein Occupancy %: 31  Reason for Midline: Antibiotic therapy  Labs: WBC 9.6, , BUN 25, Cr 0.96, GFR >60, INR NA    Orders confirmed, vessel patency confirmed with ultrasound. Risks and benefits of procedure explained to patient and education regarding line associated bloodstream infections provided. Questions answered.     Power Midline placed in LUE per licensed provider order with ultrasound guidance. 4 Fr, Single lumen Power Midline placed in Bacilic vein after 1 attempt(s). 2 mL of 1% lidocaine injected intradermally, 21 gauge microintroducer needle and modified Seldinger technique used. 15 cm catheter inserted with good blood return. Secured at 0 cm marker. Each lumen flushed without resistance with 10 mL 0.9% normal saline. Midline secured with Biopatch and Tegaderm.     Midline placement is confirmed by nurse using ultrasound and ability to flush and draw blood. Midline is appropriate for use at this time.  Patient tolerated procedure well, without complications.  No X-ray is needed for placement confirmation.  Patient condition relayed to unit RN or ordering physician via this post procedure note in the EMR.     Ultrasound images uploaded to PACS and viewable in the EMR - yes  Ultrasound imaged printed and placed in paper chart - no     BARD Power Midline ref # [id number], Lot # [id number], Expiration Date 11/30/21

## 2020-12-01 LAB
GLUCOSE BLD-MCNC: 123 MG/DL (ref 65–99)
GLUCOSE BLD-MCNC: 131 MG/DL (ref 65–99)
GLUCOSE BLD-MCNC: 135 MG/DL (ref 65–99)
GLUCOSE BLD-MCNC: 144 MG/DL (ref 65–99)
VANCOMYCIN TROUGH SERPL-MCNC: 17.9 UG/ML (ref 10–20)

## 2020-12-01 PROCEDURE — A9270 NON-COVERED ITEM OR SERVICE: HCPCS | Performed by: STUDENT IN AN ORGANIZED HEALTH CARE EDUCATION/TRAINING PROGRAM

## 2020-12-01 PROCEDURE — 700105 HCHG RX REV CODE 258: Performed by: INTERNAL MEDICINE

## 2020-12-01 PROCEDURE — 700102 HCHG RX REV CODE 250 W/ 637 OVERRIDE(OP): Performed by: PSYCHIATRY & NEUROLOGY

## 2020-12-01 PROCEDURE — A9270 NON-COVERED ITEM OR SERVICE: HCPCS | Performed by: PSYCHIATRY & NEUROLOGY

## 2020-12-01 PROCEDURE — 99232 SBSQ HOSP IP/OBS MODERATE 35: CPT | Performed by: STUDENT IN AN ORGANIZED HEALTH CARE EDUCATION/TRAINING PROGRAM

## 2020-12-01 PROCEDURE — 700102 HCHG RX REV CODE 250 W/ 637 OVERRIDE(OP): Performed by: INTERNAL MEDICINE

## 2020-12-01 PROCEDURE — 80202 ASSAY OF VANCOMYCIN: CPT

## 2020-12-01 PROCEDURE — A9270 NON-COVERED ITEM OR SERVICE: HCPCS | Performed by: INTERNAL MEDICINE

## 2020-12-01 PROCEDURE — 700102 HCHG RX REV CODE 250 W/ 637 OVERRIDE(OP): Performed by: STUDENT IN AN ORGANIZED HEALTH CARE EDUCATION/TRAINING PROGRAM

## 2020-12-01 PROCEDURE — 99232 SBSQ HOSP IP/OBS MODERATE 35: CPT | Performed by: INTERNAL MEDICINE

## 2020-12-01 PROCEDURE — 700111 HCHG RX REV CODE 636 W/ 250 OVERRIDE (IP): Performed by: INTERNAL MEDICINE

## 2020-12-01 PROCEDURE — 97535 SELF CARE MNGMENT TRAINING: CPT

## 2020-12-01 PROCEDURE — 82962 GLUCOSE BLOOD TEST: CPT | Mod: 91

## 2020-12-01 PROCEDURE — 770001 HCHG ROOM/CARE - MED/SURG/GYN PRIV*

## 2020-12-01 PROCEDURE — 90837 PSYTX W PT 60 MINUTES: CPT | Performed by: SOCIAL WORKER

## 2020-12-01 RX ORDER — FLUCONAZOLE 200 MG/1
400 TABLET ORAL DAILY
Status: COMPLETED | OUTPATIENT
Start: 2020-12-02 | End: 2020-12-05

## 2020-12-01 RX ADMIN — VANCOMYCIN HYDROCHLORIDE 1250 MG: 500 INJECTION, POWDER, LYOPHILIZED, FOR SOLUTION INTRAVENOUS at 10:28

## 2020-12-01 RX ADMIN — FUROSEMIDE 20 MG: 10 INJECTION, SOLUTION INTRAMUSCULAR; INTRAVENOUS at 15:31

## 2020-12-01 RX ADMIN — RISPERIDONE 0.5 MG: 1 TABLET ORAL at 05:26

## 2020-12-01 RX ADMIN — TRAZODONE HYDROCHLORIDE 50 MG: 50 TABLET ORAL at 21:45

## 2020-12-01 RX ADMIN — RISPERIDONE 1 MG: 1 TABLET ORAL at 17:41

## 2020-12-01 RX ADMIN — FAMOTIDINE 20 MG: 20 TABLET, FILM COATED ORAL at 05:26

## 2020-12-01 RX ADMIN — Medication 400 MG: at 17:40

## 2020-12-01 RX ADMIN — FLUOXETINE 10 MG: 10 CAPSULE ORAL at 05:26

## 2020-12-01 RX ADMIN — METOPROLOL SUCCINATE 75 MG: 50 TABLET, EXTENDED RELEASE ORAL at 05:26

## 2020-12-01 RX ADMIN — CEFTRIAXONE SODIUM 2 G: 2 INJECTION, POWDER, FOR SOLUTION INTRAMUSCULAR; INTRAVENOUS at 08:08

## 2020-12-01 RX ADMIN — METOPROLOL SUCCINATE 75 MG: 50 TABLET, EXTENDED RELEASE ORAL at 17:40

## 2020-12-01 RX ADMIN — AMIODARONE HYDROCHLORIDE 200 MG: 200 TABLET ORAL at 05:26

## 2020-12-01 RX ADMIN — LISINOPRIL 20 MG: 20 TABLET ORAL at 05:27

## 2020-12-01 RX ADMIN — LEVOTHYROXINE SODIUM 112 MCG: 112 TABLET ORAL at 05:26

## 2020-12-01 RX ADMIN — FUROSEMIDE 20 MG: 10 INJECTION, SOLUTION INTRAMUSCULAR; INTRAVENOUS at 05:27

## 2020-12-01 RX ADMIN — Medication 400 MG: at 05:26

## 2020-12-01 RX ADMIN — MODAFINIL 100 MG: 100 TABLET ORAL at 05:26

## 2020-12-01 RX ADMIN — SPIRONOLACTONE 25 MG: 50 TABLET ORAL at 05:26

## 2020-12-01 RX ADMIN — KETOCONAZOLE: 20 CREAM TOPICAL at 05:27

## 2020-12-01 RX ADMIN — CETIRIZINE HYDROCHLORIDE 10 MG: 10 TABLET, FILM COATED ORAL at 05:26

## 2020-12-01 RX ADMIN — FLUCONAZOLE 400 MG: 200 TABLET ORAL at 05:25

## 2020-12-01 RX ADMIN — INSULIN GLARGINE 10 UNITS: 100 INJECTION, SOLUTION SUBCUTANEOUS at 05:35

## 2020-12-01 RX ADMIN — HYDROCODONE BITARTRATE AND ACETAMINOPHEN 1 TABLET: 10; 325 TABLET ORAL at 08:19

## 2020-12-01 ASSESSMENT — ENCOUNTER SYMPTOMS
COUGH: 0
DIARRHEA: 0
CARDIOVASCULAR NEGATIVE: 1
NEUROLOGICAL NEGATIVE: 1
MYALGIAS: 1
ABDOMINAL PAIN: 0
NAUSEA: 0
ABDOMINAL PAIN: 1
CONSTITUTIONAL NEGATIVE: 1
PSYCHIATRIC NEGATIVE: 1
SHORTNESS OF BREATH: 0
HEARTBURN: 0
CHILLS: 0
RESPIRATORY NEGATIVE: 1
VOMITING: 0
MUSCULOSKELETAL NEGATIVE: 1
FEVER: 0

## 2020-12-01 ASSESSMENT — COGNITIVE AND FUNCTIONAL STATUS - GENERAL
DRESSING REGULAR LOWER BODY CLOTHING: TOTAL
SUGGESTED CMS G CODE MODIFIER DAILY ACTIVITY: CL
PERSONAL GROOMING: A LITTLE
HELP NEEDED FOR BATHING: A LOT
DAILY ACTIVITIY SCORE: 13
EATING MEALS: A LITTLE
DRESSING REGULAR UPPER BODY CLOTHING: A LITTLE
TOILETING: TOTAL

## 2020-12-01 ASSESSMENT — PAIN DESCRIPTION - PAIN TYPE
TYPE: ACUTE PAIN;SURGICAL PAIN
TYPE: ACUTE PAIN;SURGICAL PAIN

## 2020-12-01 NOTE — HEART FAILURE PROGRAM
Cardiovascular Nurse Navigator () Advanced Heart Failure Program Inpatient Progress Note:    HF is on patient's problems list and in MD notes. Acute vs Chronic is not noted.     Respectfully request that the distinction be clearly made as inpatient nursing interventions and outpatient preparation are different for the two.     This will also help to conserve 7 day heart failure follow up appointments for patients who have had HF exacerbations.    It would appear that patient is here being treated for perforated colon. Patient is admitted to surgical unit.    I have messaged Dr. Ryan requesting clarification.    Thank you, Glenna, Cardio RN Navigator [phone]      Per my discussion with Dr. Inman on 12/1, patient is not currently in acute HF.  Therefore, a 7 calendar day HF follow up appointment and HF discharge checklist are not indicated at this time.   Thank you, Glenna, Cardio RN Navigator [phone]   Last edited by Glenna Velasco R.N. on 12/02/20 at 0859

## 2020-12-01 NOTE — PROGRESS NOTES
[facility] Medicine Daily Progress Note    Date of Service  12/1/2020    Chief Complaint  55 y.o. male admitted 11/3/2020 with abdominal pain    Hospital Course    55-year-old male with history of schizophrenia, diabetes, heart failure with ejection fraction 30% and atrial fibrillation presented 11/3 with left upper quadrant pain, initially the patient had perisplenic hematoma after fall however he developed fever and severe pain, CT scan showed perisplenic abscess with possible fistula and perforation, patient surgery for colostomy and for bowel perforation, blood culture on 11/31 showed Streptococcus sepsis,  A new drain was placed on 11/25, drain fluid culture from the abscess showed Streptococcus and yeast Candida, patient was evaluated by infectious disease and he was treated with ceftriaxone, fluconazole and later added vancomycin to treat Enterococcus since patient is PCN allergy stop date will be 12/5/2020..     Interval Problem Update  -Evaluated examined the patient at bedside, patient not tolerating fluoxetine, awaiting psych consult for suicidal intent  -The patient has a chronic dyspnea on exertion, he is on room air.  -Labs are reviewed, improving on leukocytosis and kidney function.  -The patient is ready for discharge/placement pending psych consult for legal hold  -Try to insert midline and remove the central line    Consultants/Specialty  General surgeon  Infectious disease    Code Status  Full Code    Disposition  LTAC or SNF  Medically cleared for placement.      Review of Systems  Review of Systems   Constitutional: Negative.    HENT: Negative.    Respiratory: Negative.    Cardiovascular: Negative.    Gastrointestinal: Negative for abdominal pain, diarrhea, heartburn, nausea and vomiting.   Genitourinary: Negative.    Musculoskeletal: Negative.    Skin: Negative.    Neurological: Negative.    Psychiatric/Behavioral: Negative.         Physical Exam  Temp:  [36.2 °C (97.1 °F)-36.7 °C (98.1 °F)] 36.2  °C (97.1 °F)  Pulse:  [] 101  Resp:  [16-18] 18  BP: ()/(57-85) 123/71  SpO2:  [90 %-94 %] 93 %    Physical Exam  Constitutional:       Appearance: He is obese. He is not ill-appearing.   HENT:      Head: Normocephalic and atraumatic.   Eyes:      General: No scleral icterus.  Abdominal:      General: There is no distension.      Palpations: There is no mass.      Tenderness: There is abdominal tenderness. There is no guarding.      Comments: Colostomy and a drain in place   Musculoskeletal:         General: No swelling or deformity.      Right lower leg: No edema.      Left lower leg: No edema.   Skin:     General: Skin is warm.      Coloration: Skin is not jaundiced or pale.      Findings: No bruising.   Neurological:      General: No focal deficit present.      Mental Status: He is alert and oriented to person, place, and time. Mental status is at baseline.      Cranial Nerves: No cranial nerve deficit.      Motor: No weakness.         Fluids    Intake/Output Summary (Last 24 hours) at 12/1/2020 1456  Last data filed at 12/1/2020 1300  Gross per 24 hour   Intake 960 ml   Output 2650 ml   Net -1690 ml       Laboratory  Recent Labs     11/29/20  0125   WBC 9.6   RBC 3.16*   HEMOGLOBIN 8.2*   HEMATOCRIT 26.8*   MCV 84.8   MCH 25.9*   MCHC 30.6*   RDW 54.6*   PLATELETCT 272   MPV 10.6     Recent Labs     11/29/20  0125 11/30/20  0525   SODIUM 135 130*   POTASSIUM 4.5 4.3   CHLORIDE 96 92*   CO2 34* 34*   GLUCOSE 141* 132*   BUN 25* 21   CREATININE 0.96 0.97   CALCIUM 8.7 8.7                   Imaging  IR-MIDLINE CATHETER INSERTION WO GUIDANCE > AGE 3   Final Result                  Ultrasound-guided midline placement performed by qualified nursing staff    as above.          CT-DRAIN-PERITONEAL   Final Result      Successful splenic abscess drainage tube placement.      Plan: Twice daily flushes with 10 mL of sterile saline. Monitor outputs.         CT-ABDOMEN-PELVIS WITH   Final Result      1.   Intraparenchymal collection in the anterior aspect of the spleen has increased in size from the prior exam.      2.  Small amount of free fluid in the pelvis has increased from the prior exam.      3.  Small bilateral pleural effusions layer posteriorly, left greater than right, slightly decreased from the prior exam. Adjacent airspace disease is likely atelectasis.      4.  Cholelithiasis.      5.  Right renal scarring.      BC-[id number]-6 VIEW   Final Result      1.  No evidence of bowel obstruction.   2.  Postoperative changes as described.      CT-ABDOMEN-PELVIS W/O   Final Result      Postsurgical changes with a left abdominal stoma. Parastomal densities may be related to postsurgical hematoma measuring up to 2.8 x 2.1 cm.      Small amount of free fluid in the abdomen and pelvis.      Collection along the anterior spleen measures approximately 2.9 x 4.3 cm is decreased in size compared to the prior examination. Evaluation is limited by lack of intravenous contrast.      Third spacing.      Trace free intraperitoneal air is likely related to recent surgery.      Moderate left and small right pleural effusions with overlying atelectasis/consolidation.      Cortical scarring of the right kidney.      Cholelithiasis.         DX-CHEST-PORTABLE (1 VIEW)   Final Result      1.  Bilateral airspace and interstitial infiltrates.      2.  Cardiomegaly.      US-RENAL   Final Result    Examination was limited due to poor sound penetration related to body habitus.   Grossly normal appearance of the kidneys.      EC-ECHOCARDIOGRAM COMPLETE W/ CONT   Final Result      DX-CHEST-PORTABLE (1 VIEW)   Final Result      No pneumothorax identified following right subclavian line placement      Worsening atelectasis with consolidation at the bases not excluded      Stable cardiac silhouette enlargement      CT-ABDOMEN-PELVIS WITH   Final Result      Interval decrease in size of perisplenic abscess. Pigtail catheter now in place       Abscess has likely fistulous communication with the abutting splenic flexure of the colon. Colon is otherwise normal in appearance      Mild hepatosplenomegaly      Decreasing small left pleural effusion      Subacute anterior wedge compression fractures in the caudal thoracic spine      CT-DRAIN-PERITONEAL   Final Result      1.  CT guided perisplenic abscess catheter drainage.   2.  The current plan is to obtain a follow-up CT in 5-7 days..      OUTSIDE IMAGES-CT CHEST   Final Result      OUTSIDE IMAGES-CT ABDOMEN /PELVIS   Final Result      CT-ABDOMEN-PELVIS WITH   Final Result   Addendum 1 of 1   Addendum:   A prior noncontrast CT of the abdomen and pelvis from [facility] dated 10/19/2020 was made available for comparison. On the    prior study a perisplenic/subcapsular fluid collection was seen however    did not contain air at that time and    there was no discrete evidence of communication with the transverse colon.    Communication with splenic flexure of the colon and air within the    collection are new from the prior study.      Final      DX-CHEST-PORTABLE (1 VIEW)   Final Result      Slight interval interval improvement in congestive heart failure.           Assessment/Plan  * Spontaneous perforation of colon (HCC)- (present on admission)  Assessment & Plan  Admitted with LUQ abscess and perforation at splenic flexure   Segmental colectomy, colostomy, Patti pouch, I & D of abdominal wall abscess  Wound cultures: enterococcus and candida  And blood culture showed strep  Patient was evaluated and followed by ID  To continue ceftriaxone, fluconazole and vancomycin for 10 days after last surgery, last dose will be around 12/5  We might need to repeat images before discontinue antibiotics to make sure resolving on his abscess    Sepsis (HCC)  Assessment & Plan  Secondary to perforation of colon,  Wound cultures: enterococcus & candida.  Started on Linezolid and Fluconazole. But  his QTc is prolonged.  - DCed linezolide and started on vancomycin.  - continue Fluconazole  - DC Zyvox due to prolonged QTc  - continue  vancomycin (11/17)  - continue ceftriaxone 11/17and fluconazole  - Dced Flagyl on 11/21 due to nausea - nausea improved      Leucocytosis  Assessment & Plan  Due to intra-abdominal infection and abscess   continue antibiotics  Labs daily  Improving      Chronic heart failure (HCC)  Assessment & Plan  History of ischemic cardiomyopathy with ejection fraction 30/40%, no acute decompensation during this admission  With right ventricle dysfunction  Continue metoprolol and lisinopril and Aldactone  Continue amiodarone for atrial fibrillation  Lasix as needed  Weight daily    Paroxysmal A-fib with RVR ([facility])- (present on admission)  Assessment & Plan  Seen by cardiac electrophysiologist in 2019 and deemed not to be good candidate for anticoagulation due to frequent falls 2/2 orthostatic hypotension  CHADSVASC of 3   He was given digoxin however discontinued due to toxicity  Heart rate is controlled  Continue metoprolol XL 75 mg twice daily and amiodarone 20 mg daily  Continue holding anticoagulation at this time for possible surgery  To follow-up with cardiologist as outpatient    Prolonged QT interval  Assessment & Plan  Patient's QTc on admission 477 increased to 535 with Linezolid, zofran, zoloft, olanzapine.  DCed Linezolid, zofran, olanzapine & zoloft  QTc improved from 535 to 425 to 473  Seen by psychiatry for medication recommendations started on resperidone and modafinil.  - avoid QT prolonging medication.    Hypothyroidism  Assessment & Plan  Obese male   TSH 16.0 and T4 1.14  start on Levothyroxine 112mcg daily.  Repeat thyroid function after 6 to 8 weeks    Schizophrenia ([facility])  Assessment & Plan  History of schizophrenia on olanzapine and sertraline.  Developed hallucination during this hospitalization, also had delayed prolonged QTC  Evaluated by psychiatrist and started with  risperidone and modafinil, discontinue Zoloft and olanzapine  Stable at this time continue monitoring and follow-up with psychiatrist.  Patient reported feeling suicidal on 11/30/2020, patient placed on legal hold and psych consult placed    Elevated digoxin level  Assessment & Plan  H/o N/v and visual hallucinations including yellow halos, circles and squares  AFTER STARTING digoxin and 11/14 digoxin level 1.5 & on 11/17 Repeat digoxin level > 5,   & 0.8  Resolved  Keep holding digoxin    SRUTHI (acute kidney injury) (HCC)  Assessment & Plan  Resolved    Type 2 diabetes mellitus, without long-term current use of insulin, with peripheral neuropathy (HCC)- (present on admission)  Assessment & Plan  Patient states blood sugar well controlled at home with metformin.   Last a1c of 7.8 on 11/10/20   - continue Lantus 10U daily  - Sliding scale insulin with hypoglycemia protocol  - Ensure strict glucose control          VTE prophylaxis: lovenox

## 2020-12-01 NOTE — THERAPY
"Occupational Therapy  Daily Treatment     Patient Name: Sebastián Castro  Age:  55 y.o., Sex:  male  Medical Record #: [id number]  Today's Date: 12/1/2020     Precautions  Precautions: Fall Risk  Comments: wound vac, BETTE drain, ostomy    Assessment    Pt seen for OT treatment. He appears slightly stronger than last session in that he was able to stand fully upright twice to have his bed linens changed. He was not able to shift his weight to take any side steps. Pt appeared slightly confused throughout the session. He would continue to benefit from OT services.     Plan    Continue current treatment plan.    DC Equipment Recommendations: Unable to determine at this time  Discharge Recommendations: Recommend post-acute placement for additional occupational therapy services prior to discharge home    Subjective    \"I'm never going to be able to walk again, am I?\"     Objective       12/01/20 1443   Pain 0 - 10 Group   Therapist Pain Assessment During Activity;Nurse Notified  (denied pain )   Cognition    Comments appeared confused, saying he needs to get back to his other room and his other bed. When told he would be staying in this bed and this room for now, pt responded \"Well you better tell her then.\" Pt looking for his roommate but currently in a private room. However, pt later acknowledged that he is on a legal hold.    Balance   Sitting Balance (Static) Fair   Sitting Balance (Dynamic) Fair -   Standing Balance (Static) Trace +   Weight Shift Sitting Poor   Weight Shift Standing Absent   Bed Mobility    Supine to Sit Maximal Assist   Sit to Supine Maximal Assist  (of 3 people )   Scooting Moderate Assist   Rolling Moderate Assist to Rt.;Moderate Assist to Lt.   Activities of Daily Living   Grooming Supervision;Seated  (oral care, impaired fine motor skilled )   Upper Body Dressing Minimal Assist  (gown)   Lower Body Dressing Total Assist   Toileting Total Assist   Comments When seated EOB, noticed that pt's ostomy was " leaking. Assisted pt to clean up at EOB and CNA arrived to replace ostomy bag before pt was assisted BTB. RN aware and will check ostomy appliance.    Functional Mobility   Sit to Stand Maximal Assist   Mobility stood twice with bariatric FWW and able to fully clear buttocks from the bed. He attempted to take side steps but was unable to do so.    Activity Tolerance   Sitting Edge of Bed 40 min    Patient / Family Goals   Patient / Family Goal #1 To get my strength back   Goal #1 Outcome Progressing slower than expected   Short Term Goals   Short Term Goal # 2 Pt will dress LB with AE & Min A   Goal Outcome # 2 Goal not met   Short Term Goal # 3 Pt will be Mod A with BSC transfers   Goal Outcome # 3 Progressing slower than expected

## 2020-12-01 NOTE — PROGRESS NOTES
Patient expressed depression with hopelessness, patient has plan to kill himself by going home and drink alcohol or drugs to kill himself, patient feels guilty.     Legal hold was initiated   Psych consult was placed  Patient states he has history of depression and Prozac was working we will start a small dose of Prozac 10 mg daily waiting for further psych recommendation.

## 2020-12-01 NOTE — CARE PLAN
Problem: Safety  Goal: Will remain free from injury  Outcome: PROGRESSING AS EXPECTED  Note: Safety sitter at bedside, patient still noting suicidal thoughts     Problem: Pain Management  Goal: Pain level will decrease to patient's comfort goal  Outcome: PROGRESSING AS EXPECTED  Note: Medicate per MAR prn

## 2020-12-01 NOTE — PROGRESS NOTES
Central line removed per MD order. Sutures removed intact, blue tip pf catheter visualized. Pt was able to hold breath for removal. Pressure applied at site for >5minutes. Dressing CDi and observed q10 for 1 hour. Pt tolerated well. Patient instructed to remain flat for 60 minutes. Call bell with in reach.

## 2020-12-01 NOTE — PROGRESS NOTES
Assumed care of patient from night shift RN.  Patient is alert and oriented times 4, states pain of 7/10, medicated per MAR.  Legal hold, safety sitter at bedside.  Safety checklist completed.  VSS /78   Pulse (!) 103 Comment: Notified nurse.  Temp 36.3 °C (97.4 °F) (Temporal)   Resp 18   Ht 1.829 m (6')   Wt (!) 194.5 kg (428 lb 12.7 oz)   SpO2 90%   BMI 58.16 kg/m²   PIV in the L wrist, patent and saline locked.  Midline PIV in the ISH, patent and running antibiotics.  On RA with saturations in the mid 90s.  History of Afib, not tele monitored.  Ostomy to the LLQ, + output.  Incontinent of urine, Purewick in use.  Adequate urine output.  Low fiber GI soft diet, tolerating well.  MLI with wound vac, dressing intact, no leaks noted.  Ostomy to the LLQ, + output.  Redness and skin tear noted to the panus.  Generalized edema noted.  IR drain to the L flank, to be flushed Q shift.  Patient is a max assist  Moderate risk for skin breakdown, low air loss bed, Q 2 hour turns, pillows in use for support and positioning.  POC discussed for the day, bed is locked and in the lowest position, call light is within reach.  All needs are met at this time, hourly rounding is in place.

## 2020-12-01 NOTE — DISCHARGE PLANNING
Obtained copy of certified legal hold, placed original legal hold back into chart. Scanned copy of legal hold into chart.

## 2020-12-01 NOTE — PROGRESS NOTES
"Patient states \"there is a dog in my room\".  Questioned patient further, he states that he could see the dog running down the [place] last night and now it is in his room, laying on his bed.  The patient asked the bedside sitter \"don't you see it right there?\".  MD made aware.  Psych consult pending.  Upon hourly rounds, patient states that the dog is a spirit, and not a real dog.  "

## 2020-12-01 NOTE — PROGRESS NOTES
Infectious Disease Progress Note    Author: Cristy Holley M.D. Date & Time of service: 12/1/2020  1:46 PM    Chief Complaint:  Intrabdominal abscess and bacteremia     Interval History:   55 y.o. diabetic male admitted 11/3/2020.+ A. fib, CHD, and severe morbid obesity.  Seen by ID in the past for right foot abscess with cultures positive for MRSA and Proteus.  s/p GLF approximately 1 month ago and was admitted to [facility] with left upper quadrant pain.  CT at the time revealed a subcapsular splenic hematoma. Presented to ED complaining of abdominal pain.  11/9 AF WBC 9.4 Less pain in side but remains tender to touch. Denies SE Zyvox-counseled about sxs serotonin syndrome  11/11 T-max 99.7 WBC 15.4 patient underwent colectomy, colostomy placement and I&D of flank wound with wound VAC placement on 11/10/2020 by Dr. Desouza.  Postop course complicated by hypotension and transferred to the ICU.  On pressors.  Patient complaining of shortness of breath as well as lightheadedness.  Also has postop abdominal pain.  11/12 afebrile WBC 13.3.  Patient weaned off pressors overnight.  Operative cultures growing group D Enterococcus and yeast.  Patient feeling better today.  Improved shortness of breath.  Abdominal pain stable  11/13 afebrile WBC 13.8 patient continues to feel better.  He is inquiring when he can have food by mouth.  11/14 afebrile WBC 9.9.  Patient feeling a bit better today.  His diet has been slowly advanced.  He is however complaining of some sharp abdominal pain when he coughs  11/16  AF, O2 RA, abdominal pain and poor appetite.   11/17 AF, O2 1 L NC , WBC 19.1. Drain output 465 cc yesterday. Ongoing nausea, poor appetite and abdomen is quite tender on palpation.  Will broaden antibiotics.   11/18 AF, O2 1 L NC, WBC 18.1, Drain output- 610 cc. Patient still has abdominal pain and significant nausea.  Examined abdominal wound photos and discussed with wound care team.  He has a large wound but  clean appearing base with granulation tissue.  No obvious infection in the surrounding tissues.  Left side wound with packing in place, ongoing drainage but clearing .    AF, tachycardia and hypertensive , WBC 17.2, drain output-205 cc yesterday. C/o nausea and agree with plan to transition Flagyl to IV to see if this helps.     AF WBC 13.6 feels OK-abd pain controlled. Poor appetite and not eating much. Denies SE abx-nausea resolved   AF WBC 16 no new complaints-no new cough, dyspnea and abd pain the same. Appetite so-so.   AF WBC 13 CT reviewed H and H continues to decrease. Somnolent today-no acute events overnight   AF 11.8 had new drain placed-tolerated well-still poor appetite    AF WBC 11.4 cxs with enterococcus-tired today. No new complaints   AF WBC 9.6 c/o itching face and back. Pain controlled    AF PICC bandage itchy-VAC changed this am-pictures reviewed. No new complaints   AF suicidal ideation and seeing spirit dog noted-psych eval ordered    Review of Systems:  Review of Systems   Constitutional: Negative for chills and fever.   Respiratory: Negative for cough and shortness of breath.    Gastrointestinal: Positive for abdominal pain. Negative for diarrhea, nausea and vomiting.   Musculoskeletal: Positive for joint pain and myalgias.   Skin: Positive for itching and rash.   Psychiatric/Behavioral: Positive for suicidal ideas.   All other systems reviewed and are negative.      Hemodynamics:  Temp (24hrs), Av.4 °C (97.5 °F), Min:36.2 °C (97.1 °F), Max:36.7 °C (98.1 °F)  Temperature: 36.2 °C (97.1 °F)  Pulse  Av  Min: 51  Max: 161   Blood Pressure: 123/71      Physical Exam:  Physical Exam  Vitals signs and nursing note reviewed.   Constitutional:       General: He is not in acute distress.     Appearance: He is obese. He is not ill-appearing, toxic-appearing or diaphoretic.   HENT:      Nose: No rhinorrhea.      Mouth/Throat:      Pharynx: No  oropharyngeal exudate.   Eyes:      General: No scleral icterus.     Extraocular Movements: Extraocular movements intact.      Pupils: Pupils are equal, round, and reactive to light.   Neck:      Musculoskeletal: Neck supple. No muscular tenderness.   Cardiovascular:      Rate and Rhythm: Tachycardia present. Rhythm irregular.   Pulmonary:      Effort: Pulmonary effort is normal. No respiratory distress.      Breath sounds: No stridor. No wheezing.      Comments: Line right chest nontender-excoriations  Abdominal:      General: There is no distension.      Palpations: Abdomen is soft.      Tenderness: There is abdominal tenderness. There is no guarding.      Comments: Wound VAC in place, no sign infection in surrounding tissue-measure 11 X7 X 3 cm  Ostomy  Drain serosanguinous   Musculoskeletal:      Right lower leg: Edema present.      Left lower leg: Edema present.   Skin:     General: Skin is warm.      Coloration: Skin is not jaundiced.      Findings: Bruising and rash present.      Comments: Rash on face-not on chest or abd. Macular blanching  Unable to visualize back   Neurological:      General: No focal deficit present.      Mental Status: He is oriented to person, place, and time.         Meds:    Current Facility-Administered Medications:   •  vancomycin  •  FLUoxetine  •  ketoconazole  •  furosemide  •  acetaminophen  •  diphenhydrAMINE  •  metoclopramide  •  risperiDONE  •  risperiDONE  •  magnesium oxide  •  modafinil  •  levothyroxine  •  HYDROcodone/acetaminophen  •  prochlorperazine  •  metoprolol SR  •  lisinopril  •  amiodarone  •  traZODone  •  Metoprolol Tartrate  •  Respiratory Therapy Consult  •  MD Alert...Vancomycin per Pharmacy  •  cefTRIAXone (ROCEPHIN) IV  •  spironolactone  •  cetirizine  •  famotidine  •  fluconazole  •  insulin glargine  •  insulin regular **AND** POC Blood Glucose **AND** NOTIFY MD and PharmD **AND** glucose **AND** dextrose 50%  •  albuterol    Labs:  Recent Labs      11/29/20  0125   WBC 9.6   RBC 3.16*   HEMOGLOBIN 8.2*   HEMATOCRIT 26.8*   MCV 84.8   MCH 25.9*   RDW 54.6*   PLATELETCT 272   MPV 10.6   NEUTSPOLYS 62.40   LYMPHOCYTES 22.90   MONOCYTES 8.30   EOSINOPHILS 5.10   BASOPHILS 0.80     Recent Labs     11/29/20  0125 11/30/20  0525   SODIUM 135 130*   POTASSIUM 4.5 4.3   CHLORIDE 96 92*   CO2 34* 34*   GLUCOSE 141* 132*   BUN 25* 21     Recent Labs     11/29/20  0125 11/30/20  0525   ALBUMIN 2.3* 2.3*   TBILIRUBIN 0.4 0.5   ALKPHOSPHAT 149* 137*   TOTPROTEIN 5.4* 5.6*   ALTSGPT 8 10   ASTSGOT 18 19   CREATININE 0.96 0.97       Imaging:  Ct-abdomen-pelvis W/o    Result Date: 11/17/2020 11/17/2020 1:51 PM HISTORY/REASON FOR EXAM:  Nausea/vomiting; elevated WBC; R/O abscess. TECHNIQUE/EXAM DESCRIPTION: CT scan of the abdomen and pelvis without contrast. Noncontrast helical scanning was obtained from the diaphragmatic domes through the pubic symphysis. Low dose optimization technique was utilized for this CT exam including automated exposure control and adjustment of the mA and/or kV according to patient size. COMPARISON: 11/8/2020. FINDINGS: There is a moderate left and small right pleural effusion with overlying atelectasis/consolidation. Trace free air is seen in the abdomen. Evaluation of parenchymal and vascular structures is limited by the lack of intravenous contrast. The liver appears  unremarkable. There are gallstones within the gallbladder. There is hypodensity along the anterior aspect of the spleen measuring approximately 4.3 x 2.9 cm. No adrenal mass is identified. There is right renal atrophy with cortical scarring. There is no  evidence of hydronephrosis. Pancreas appears unremarkable. Aorta is not aneurysmal. There are small inguinal, retroperitoneal and mesenteric lymph nodes. There is no evidence of bowel obstruction. Visualized appendix appears unremarkable. There is a left abdominal stoma. There is parastomal density measuring up to 2.8 x 2.1 cm likely  representing a postsurgical hematoma. There is a small amount of free fluid in the abdomen and pelvis. Bladder is mildly distended. There is a surgical drain in the left abdomen. There is third spacing. Degenerative changes are seen in the spine. Compression deformities of T11 and T12 are again noted. Bones are demineralized.     Postsurgical changes with a left abdominal stoma. Parastomal densities may be related to postsurgical hematoma measuring up to 2.8 x 2.1 cm. Small amount of free fluid in the abdomen and pelvis. Collection along the anterior spleen measures approximately 2.9 x 4.3 cm is decreased in size compared to the prior examination. Evaluation is limited by lack of intravenous contrast. Third spacing. Trace free intraperitoneal air is likely related to recent surgery. Moderate left and small right pleural effusions with overlying atelectasis/consolidation. Cortical scarring of the right kidney. Cholelithiasis.     Ct-abdomen-pelvis With    Result Date: 11/9/2020 11/8/2020 11:34 PM HISTORY/REASON FOR EXAM:  Abdominal infection suspected; Has perisplenic abscess with possible connection to bowel TECHNIQUE/EXAM DESCRIPTION: CT of the abdomen and pelvis with contrast. Contrast-enhanced helical scanning was obtained from the diaphragmatic domes through the pubic symphysis following the bolus administration of 100 mL of nonionic contrast (Omnipaque 350) without complication. Low dose optimization technique was utilized for this CT exam including automated exposure control and adjustment of the mA and/or kV according to patient size. COMPARISON: 11/3/2020 CT FINDINGS: The visualized lung bases show diminished small left pleural fluid that layers dependently. There is similar middle and left lower lobe atelectasis . Similar multichamber cardiac enlargement CT Abdomen: There is a new pigtail catheter in the anterior perisplenic fluid collection. The collection does diminish in size now measuring 58 x 33 from  79 x 40 mm. There is still gas within it and it is in direct contact with the splenic flexure of the colon. A small amount of gas is also seen in the more superficial soft tissues inferior to the pigtail catheter which crosses the ninth intercostal space No new abscess is detected Contrast is present in the small bowel but not yet in the transverse colon. No contrast extravasation is seen. No abnormal bowel dilatation The pancreas, adrenal glands and kidneys enhance normally. Mild hepatosplenomegaly Some low-density in the gallbladder likely indicating cholelithiasis There is no lymphadenopathy or aneurysmal change of the aorta. Compression deformities in the spine are again seen with subacute fractures resulting in anterior wedging at T11 and T12 There is diffuse fat stranding indicating anasarca CT Pelvis: Visualized pelvic structures are unremarkable. Prostate is within normal limits for age. No lymphadenopathy. There is no free air or free fluid.     Interval decrease in size of perisplenic abscess. Pigtail catheter now in place Abscess has likely fistulous communication with the abutting splenic flexure of the colon. Colon is otherwise normal in appearance Mild hepatosplenomegaly Decreasing small left pleural effusion Subacute anterior wedge compression fractures in the caudal thoracic spine    Ct-abdomen-pelvis With    Addendum Date: 11/5/2020    Addendum: A prior noncontrast CT of the abdomen and pelvis from [facility] dated 10/19/2020 was made available for comparison. On the prior study a perisplenic/subcapsular fluid collection was seen however did not contain air at that time and there was no discrete evidence of communication with the transverse colon. Communication with splenic flexure of the colon and air within the collection are new from the prior study.    Result Date: 11/5/2020  11/3/2020 4:02 PM HISTORY/REASON FOR EXAM:  Abd pain, unspecified; IV contrast only. TECHNIQUE/EXAM  DESCRIPTION:   CT scan of the abdomen and pelvis with contrast. Contrast-enhanced helical scanning was obtained from the diaphragmatic domes through the pubic symphysis following the bolus administration of nonionic contrast without complication. 100 mL of Omnipaque 350 nonionic contrast was administered without complication. Low dose optimization technique was utilized for this CT exam including automated exposure control and adjustment of the mA and/or kV according to patient size. COMPARISON: 9/26/2018 FINDINGS: Chest Base: Small left pleural effusion with mild bibasilar atelectasis. Heart is mildly enlarged. Liver:  Diffuse hypoattenuation of the liver suggesting fatty infiltration. Moderately enlarged. Gallbladder: Cholelithiasis. Biliary tract: Nondilated. Pancreas: Normal. Spleen: There is a rim-enhancing perisplenic fluid collection which contains air and which communicates with the splenic flexure of the colon. This most likely represents an abscess and measures about 8.7 x 7.9 cm. The anterior aspect of the spleen is nonenhancing with irregular margins could be related to infection of splenic parenchyma or infarction. Adrenals: Normal. Kidneys and Collecting Systems:  There is mild right renal cortical scarring. No hydronephrosis. No renal mass. Gastrointestinal tract:  No bowel obstruction. The appendix is normal. Peritoneum: No free air or free fluid. Reproductive organs:  Normal. Bladder:  Normal. Vessels:  Normal caliber. Lymph  Nodes:  No lymphadenopathy. Abdominal wall: Within normal limits. Bones:  Age indeterminate possibly subacute to chronic T11 and T12 superior endplate compression fractures.     1.  Peripherally enhancing perisplenic fluid collection which communicates with the splenic flexure of the colon and which contains foci of air is most consistent with an abscess/infected fluid collection. There is nonenhancement of the anterior aspect of the spleen suggesting infection/infarction.  Surgical consultation is recommended. 2.  Hepatomegaly and hepatic steatosis. 3.  Cholelithiasis. 4.  Right renal cortical scarring. No hydronephrosis. 5.  Small left pleural effusion. Mild bibasilar atelectasis. 6.  Subacute to chronic appearing T11 and T12 superior endplate compression fractures. These findings were discussed with SVETLANA PAREDES on 11/3/2020 4:49 PM.     Ct-drain-peritoneal    Result Date: 11/4/2020 11/4/2020 5:19 PM HISTORY/REASON FOR EXAM:  Joya Splenic Fluid Collection TECHNIQUE/EXAM DESCRIPTION: LUQ parasplenic abscess drainage with CT guidance. Low dose optimization technique was utilized for this CT exam including automated exposure control and adjustment of the mA and/or kV according to patient size. PROCEDURE: Informed consent was obtained. SEDATION: Moderate sedation was provided. Pulse oximetry and continuous cardiac monitoring by the nurse was performed throughout the exam. Intraservice time was 30 minutes. Localizing CT images were obtained with the patient in supine position. The skin was prepped with Betadine and draped in a sterile fashion. Following local anesthesia with 1% Lidocaine, a 17 G guiding needle was placed and needle path confirmed with CT. An Amplatz guidewire was placed and following serial tract dilatation, a 10 Czech pigtail locking catheter was placed. A specimen was collected and submitted for culture and sensitivity and Gram stain. Total of 6 mL of Brownish fluid was drained. The catheter was secured to the skin and connected to suction bulb drainage. Final CT images were obtained documenting catheter position. The patient tolerated the procedure well with no evidence of complication. COMPARISON: CT scan abdomen and pelvis 11/3/2020 FINDINGS: The final CT images show satisfactory catheter position within the target collection.     1.  CT guided perisplenic abscess catheter drainage. 2.  The current plan is to obtain a follow-up CT in 5-7  days..    [id number] View    Result Date: 11/20/2020 11/20/2020 2:00 PM HISTORY/REASON FOR EXAM:  Abdominal Pain. TECHNIQUE/EXAM DESCRIPTION AND NUMBER OF VIEWS:  1 view(s) of the abdomen. COMPARISON: None FINDINGS: No evidence for small bowel obstruction. Surgical clips present in the mid abdomen. LEFT mid abdominal stoma noted. Surgical drain present in the LEFT lateral abdomen. Catheter projects of the RIGHT midabdomen, possibly wound VAC. Degenerative change of lumbar spine.     1.  No evidence of bowel obstruction. 2.  Postoperative changes as described.    Dx-chest-portable (1 View)    Result Date: 11/17/2020 11/17/2020 9:19 AM HISTORY/REASON FOR EXAM: Elevated white blood cell count. TECHNIQUE/EXAM DESCRIPTION AND NUMBER OF VIEWS: Single portable view of the chest. COMPARISON: 11/10/2020 FINDINGS: There are bilateral interstitial and airspace infiltrates. There is small bilateral pleural effusion. The heart is enlarged. Right subclavian central line is again seen.     1.  Bilateral airspace and interstitial infiltrates. 2.  Cardiomegaly.    Dx-chest-portable (1 View)    Result Date: 11/10/2020  11/10/2020 12:29 PM HISTORY/REASON FOR EXAM: new central line. TECHNIQUE/EXAM DESCRIPTION AND NUMBER OF VIEWS: Single AP view of the chest. COMPARISON: November 3 FINDINGS: Hardware: ] Tip overlies the cavoatrial junction Lungs: Linear middle lobe and left basilar opacity is seen. Volumes are low Pleura:  No pneumothorax is identified Heart and mediastinum: There is stable cardiac silhouette enlargement.     No pneumothorax identified following right subclavian line placement Worsening atelectasis with consolidation at the bases not excluded Stable cardiac silhouette enlargement    Dx-chest-portable (1 View)    Result Date: 11/3/2020  11/3/2020 3:41 PM HISTORY/REASON FOR EXAM:  Chest Pain. TECHNIQUE/EXAM DESCRIPTION AND NUMBER OF VIEWS: Single portable view of the chest. COMPARISON: September 10, 2020 FINDINGS: The  cardiac size is enlarged. There is interstitial prominence. Lung aeration has improved. No pneumothorax. No obvious pleural effusion.     Slight interval interval improvement in congestive heart failure.    Us-renal    Result Date: 11/11/2020 11/11/2020 6:21 PM HISTORY/REASON FOR EXAM:  Abnormal Labs TECHNIQUE/EXAM DESCRIPTION: Renal ultrasound. COMPARISON:  None FINDINGS: The right kidney measures 9.39 cm. The left kidney measures 9.54 cm. There is no hydronephrosis. There are no abnormal calcifications. The bladder not fully the time of imaging.      Examination was limited due to poor sound penetration related to body habitus. Grossly normal appearance of the kidneys.    Ec-echocardiogram Complete W/ Cont    Result Date: 11/11/2020  Transthoracic Echo Report Echocardiography Laboratory CONCLUSIONS Normal left ventricular chamber size. Moderately reduced left ventricular systolic function. Left ventricular ejection fraction is visually estimated to be 30-40%; variable related to atrial fibrillation. Mild mitral regurgitation. Moderately dilated right ventricle. Reduced right ventricular systolic function. Right heart pressures are normal. No prior study is available for comparison. LV EF:        % FINDINGS Left Ventricle 3 mL of contrast was administered. Contrast was used to enhance visualization of the endocardial border. Existing IV was used. Mild concentric left ventricular hypertrophy. Normal left ventricular chamber size.  Moderately reduced left ventricular systolic function. Left ventricular ejection fraction is visually estimated to be 30-40%. Diastolic function is difficult to assess with atrial fibrillation. Right Ventricle Moderately dilated right ventricle. Reduced right ventricular systolic function. Right Atrium Enlarged right atrium. Normal inferior vena cava size and inspiratory collapse. Left Atrium Normal left atrial size. Mitral Valve Structurally normal mitral valve. Mild mitral  regurgitation. No mitral stenosis. Aortic Valve Tricuspid aortic valve. Structurally normal aortic valve. No stenosis or regurgitation seen. Tricuspid Valve Structurally normal tricuspid valve. Mild tricuspid regurgitation. Right atrial pressure is estimated to be 3 mmHg. Estimated right ventricular systolic pressure  is 25 mmHg. No tricuspid stenosis. Right heart pressures are normal. Pulmonic Valve Structurally normal pulmonic valve without significant stenosis or regurgitation. Pericardium Normal pericardium without effusion. Aorta The aortic root is normal.  Ascending aorta diameter is 3.6 cm. Germain Pacheco M.D. (Electronically Signed) Final Date:     11 November 2020                 12:38 Amended:        11 November 2020 12:40    CT scan 11/24  CT Abdomen:  The liver, adrenal glands, and pancreas are unremarkable.     There are multiple nitrogen-containing calculi in the gallbladder.     Hypodense collection in the anterior spleen has increased in size to approximately 5.8 x 4.6 cm, previously 4.2 x 2.9 cm. There is stranding in the adjacent fat.     The kidneys enhance symmetrically. There is right renal scarring.     Left abdominal colostomy is again noted with small adjacent hematomas in the subcutaneous fat. There is residual contrast in the distal colon. No contrast extravasation or pneumoperitoneum is appreciated.     There are calcifications in the arteries.     Micro:  Results     Procedure Component Value Units Date/Time    CULTURE WOUND W/ GRAM STAIN [[id number]]  (Abnormal)  (Susceptibility) Collected: 11/25/20 1652    Order Status: Completed Specimen: Wound Updated: 11/28/20 1602     Significant Indicator POS     Source WND     Site Joya-splenic drain     Culture Result Growth noted after further incubation, see below for  organism identification.       Gram Stain Result Moderate WBCs.  Rare Gram positive cocci.       Culture Result Enterococcus faecalis  Rare growth      Narrative:       Collected By:[id number] ERLIN SPARROW  Joya-splenic collection.  Collected By:[id number] ERLIN SPARROW    Susceptibility     Enterococcus faecalis (1)     Antibiotic Interpretation Microscan Method Status    Daptomycin Sensitive 4 mcg/mL ISAMAR Final    Ampicillin Sensitive <=2 mcg/mL ISAMAR Final    Gent Synergy Sensitive <=500 mcg/mL ISAMAR Final    Vancomycin Sensitive 1 mcg/mL ISAMAR Final    Penicillin Sensitive 2 mcg/mL ISAMAR Final                   GRAM STAIN [[id number]] Collected: 11/25/20 1652    Order Status: Completed Specimen: Wound Updated: 11/26/20 0944     Significant Indicator .     Source WND     Site Joya-splenic drain     Gram Stain Result Moderate WBCs.  Rare Gram positive cocci.      Narrative:      Collected By:[id number] ERLIN SPARROW  Joya-splenic collection.  Collected By:[id number] ERLIN SPARROW    FLUID CULTURE W/GRAM STAIN [[id number]] Collected: 11/25/20 1705    Order Status: Sent Specimen: Body Fluid from Peritoneal Fluid     FLUID CULTURE W/GRAM STAIN [[id number]] Collected: 11/25/20 1625    Order Status: Canceled Specimen: Other from Peritoneal Fluid           Assessment:  Active Hospital Problems    Diagnosis   • *Spontaneous perforation of colon ([facility]) [K63.1]   • Leucocytosis [D72.829]   • Schizophrenia ([facility]) [F20.9]   • Sepsis ([facility]) [A41.9]   • Elevated digoxin level [R78.89]   • SRUTHI (acute kidney injury) ([facility]) [N17.9]   • Adrenal insufficiency ([facility]) [E27.40]   • LV dysfunction [I51.9]   • Paroxysmal A-fib with RVR ([facility]) [I48.0]   • Type 2 diabetes mellitus, without long-term current use of insulin, with peripheral neuropathy ([facility]) [E11.9]   • Hypothyroidism [E03.9]   • Prolonged QT interval [R94.31]     Hospital Course:   CT scan on 11/3 revealed a peripherally enhancing perisplenic fluid collection measuring 8.7 x 7.9 cm, communicating with the splenic flexure of the colon and contained foci of air consistent with abscess/infected fluid.  Also noted nonenhancing of the anterior aspect of the  spleen suggesting infection/infarction.    Status post surgery on 11/10/2020    Leukocytosis -Decreased     Acute kidney injury- improved   Renal ultrasound-unremarkable  Nephrology following  Avoid nephrotoxins       Perisplenic abscess with possible communication to the bowel  Left upper quadrant abscess  s/p ground-level fall complicated by a subcapsular splenic hematoma  Afebrile  Splenic enhancement on CT, suggesting infection or infarction  -s/p drainage 11/4 and peritoneal fluid Efaecalis, ampicillin sensitive and C albicans  S/p segmental colectomy, colostomy, irrigation and debridement of flank wound and wound VAC placement on 11/10/2020 by Dr. Desouza.  Patient found to have a left upper quadrant abscess inferior to the spleen with a large bowel wall defect.  Also noted to have an abdominal wall abscess.  Op cultures (colon perforation) - E faecalis and Candida albicans.    -CT abdomen pelvis without on 11/17-hypodensity along anterior aspect of the spleen measuring 4.3 x 2.9 cm.  Parastomal density measuring two-point by 2.1 cm likely representing postsurgical hematoma.  This is decreased in size from prior.  Small amount of free fluid in the abdomen pelvis.  -Drain removed on 11/21  -CT 11/24 with enlarging anterior splenic fluid collection  Deemed nonsurical-  -11/25 new drain placed by IR. Culture + Efaecalis  -Wound granulating     Continue vancomycin to treat the Enterococcus (PCN allergic) for now-(prior Efaecalis)-monitor renal function  Continue ceftriaxone to provide gram-negative coverage.    Continue fluconazole 400 mg daily  for the Candida  Anticipate 10 day course from date of last surgery/source control  Tentative stop date 12/5/2020     Rash, neot improved  Atypical for drug rash  Continue Nizoral to face daily for 7-10 days  Encourage OOB    Diabetes mellitus  Keep BS under 150 to help control current infection  -128        Antibiotic allergies: Daptomycin rash, penicillins rash and  hives, tolerates cephalosporins-discussed allergies and he states that he has developed significant rash and hives with penicillins several times, most recently a year and a half ago- will avoid penicillins

## 2020-12-01 NOTE — DISCHARGE PLANNING
Agency/Facility Name: [facility]  Outcome: L/M for admissions requesting referral status    @0759  Agency/Facility Name: [facility]  Outcome: L/M for [facility] requesting referral status      Agency/Facility Name: [facility]  Referral sent per [facility] form @ 0820      @1002  Agency/Facility Name: [facility]  Spoke To: Sathish  Outcome: Referral pending, bariatric pt.     @1212  [facility]  Spoke To: Admissions  Outcome: Facility has no beds     @1215  Agency/Facility Name: [facility]  Outcome: L/M for admissions requesting referral status    @1555  Agency/Facility Name: [facility]  Outcome: L/M for [facility]aco requesting referral status

## 2020-12-01 NOTE — PROGRESS NOTES
Pharmacy Kinetics      55 y.o. male on Vancomycin Day # 13           2020     Currently on Vancomycin 1750 mg iv q24hr (0800)     Provider specified end date: Per ID - Tentatively planned for 10 days from date of last surgery/ source control (drains placed - tentative stop date of )     Indication for Treatment: IAI     Pertinent history per medical record: Admitted on 11/3/2020 for  concern of abdominal pain after surgical intervention. Concern for subsequent colonic perforation and possible abscess. ID and surgery consulted.     Other antibiotics: Ceftriaxone 2 grams q24H, fluconazole 400 mg q24H     Allergies: Daptomycin, Pcn [penicillins], Unasyn [ampicillin-sulbactam sodium], and Vancomycin      List concerns for renal function: BMI, BUN:SCr> 20:1     Pertinent cultures to date:   11/10/20: Colon Perforation - Light Growth - Enterococcus faecalis and Candida albicans   20: Joya-splenic drain - Rare Growth - Enterococcus faecalis     MRSA nares swab if pneumonia is a concern (ordered/positive/negative/n-a): N/A    Recent Labs     20  0125   WBC 9.6   NEUTSPOLYS 62.40     Recent Labs     20  0400 20  0125 20  0525   BUN 28* 25* 21   CREATININE 0.97 0.96 0.97   ALBUMIN 2.2* 2.3* 2.3*     No results for input(s): VANCOTROUGH, VANCOPEAK, VANCORANDOM in the last 72 hours.    Intake/Output Summary (Last 24 hours) at 2020 1639  Last data filed at 2020 1300  Gross per 24 hour   Intake 480 ml   Output 590 ml   Net -110 ml      /87   Pulse 67   Temp 36.5 °C (97.7 °F) (Temporal)   Resp 18   Ht 1.829 m (6')   Wt (!) 194.5 kg (428 lb 12.7 oz)   SpO2 96%  Temp (24hrs), Av.6 °C (97.8 °F), Min:36.5 °C (97.7 °F), Max:36.6 °C (97.9 °F)      A/P   1. Vancomycin dose change: No.   2. Next vancomycin level: Tomorrow,  at 0730   3. Goal trough: 10 - 15 mcg/mL   4. Comments: Renal function stable. BP improved. VS WNL. Afebrile.     Ashley Shetty, PharmD,  BCPS

## 2020-12-01 NOTE — PROGRESS NOTES
"Pt was observed to be stuttering and mumbling. Pt was asked if he knows when his mental health is suffering or deteriorating, and patient stated yes. When asked if he felt that was happening now patient answered that in the past he has known because he starts mumbling and getting agitated and talking in his sleep. This RN investigated further, and patient admited to hearing voices in the past. When asked if he is hearing any now he stated that he has a hard time not hearing negative talk about his reliance on nursing staff and general condition. Pt declined to state if it was \"voices\" saying this or if it is self talk and evaluation.   Pt states that he does in fact have a plan for how he would kill himself if he were not in the hospital. He states he would use his cardiac medications and alcohol. Pt states he is unsure of how he would do it here because people check in on him. Pt clearly states if he were to leave today he would act on his plan. Pt was thanked for sharing this information and feelings with RN. Pt made verbal promise with this RN not to act on SI at this time.     CNA stationed at [facility].   MD notified.   Charge notified.   [facility] notified.     1856: MD to bedside    Objects removed from room. Immovable objects of concern discussed with NOC RN and sitter such as traction/trapeze. Sitter notified of high risk and is stationed inside room.   "

## 2020-12-01 NOTE — DISCHARGE PLANNING
Anticipated Discharge Disposition: Inpatient [facility] VS [facility].    Action: This case was discussed today during IDT Rounds. MD is aware, this patient has an active referral for [facility]; however, he was placed on a LH yesterday afternoon.    LSW explained, [facility] was notified via [employer] about the LH. This LSW faxed the LH Document to [facility].    Barriers to Discharge: Medical Clearance. Confirmation of acceptance / bed at [facility].    Plan: As Above. Awaiting Psych Consult.

## 2020-12-02 LAB
ANION GAP SERPL CALC-SCNC: 8 MMOL/L (ref 7–16)
BASOPHILS # BLD AUTO: 0.7 % (ref 0–1.8)
BASOPHILS # BLD: 0.07 K/UL (ref 0–0.12)
BUN SERPL-MCNC: 16 MG/DL (ref 8–22)
CALCIUM SERPL-MCNC: 8.9 MG/DL (ref 8.5–10.5)
CHLORIDE SERPL-SCNC: 94 MMOL/L (ref 96–112)
CO2 SERPL-SCNC: 33 MMOL/L (ref 20–33)
CREAT SERPL-MCNC: 0.95 MG/DL (ref 0.5–1.4)
EOSINOPHIL # BLD AUTO: 0.68 K/UL (ref 0–0.51)
EOSINOPHIL NFR BLD: 7.1 % (ref 0–6.9)
ERYTHROCYTE [DISTWIDTH] IN BLOOD BY AUTOMATED COUNT: 57.1 FL (ref 35.9–50)
GLUCOSE BLD-MCNC: 109 MG/DL (ref 65–99)
GLUCOSE BLD-MCNC: 125 MG/DL (ref 65–99)
GLUCOSE BLD-MCNC: 153 MG/DL (ref 65–99)
GLUCOSE BLD-MCNC: 229 MG/DL (ref 65–99)
GLUCOSE SERPL-MCNC: 141 MG/DL (ref 65–99)
HCT VFR BLD AUTO: 29 % (ref 42–52)
HGB BLD-MCNC: 8.7 G/DL (ref 14–18)
IMM GRANULOCYTES # BLD AUTO: 0.03 K/UL (ref 0–0.11)
IMM GRANULOCYTES NFR BLD AUTO: 0.3 % (ref 0–0.9)
LYMPHOCYTES # BLD AUTO: 2.06 K/UL (ref 1–4.8)
LYMPHOCYTES NFR BLD: 21.5 % (ref 22–41)
MCH RBC QN AUTO: 25.7 PG (ref 27–33)
MCHC RBC AUTO-ENTMCNC: 30 G/DL (ref 33.7–35.3)
MCV RBC AUTO: 85.8 FL (ref 81.4–97.8)
MONOCYTES # BLD AUTO: 0.7 K/UL (ref 0–0.85)
MONOCYTES NFR BLD AUTO: 7.3 % (ref 0–13.4)
NEUTROPHILS # BLD AUTO: 6.06 K/UL (ref 1.82–7.42)
NEUTROPHILS NFR BLD: 63.1 % (ref 44–72)
NRBC # BLD AUTO: 0 K/UL
NRBC BLD-RTO: 0 /100 WBC
PLATELET # BLD AUTO: 472 K/UL (ref 164–446)
PMV BLD AUTO: 10.2 FL (ref 9–12.9)
POTASSIUM SERPL-SCNC: 4 MMOL/L (ref 3.6–5.5)
RBC # BLD AUTO: 3.38 M/UL (ref 4.7–6.1)
SODIUM SERPL-SCNC: 135 MMOL/L (ref 135–145)
WBC # BLD AUTO: 9.6 K/UL (ref 4.8–10.8)

## 2020-12-02 PROCEDURE — 302111 WAFER OST 2.25IN N IMG RD 2 PC (BARRIER): Performed by: STUDENT IN AN ORGANIZED HEALTH CARE EDUCATION/TRAINING PROGRAM

## 2020-12-02 PROCEDURE — A9270 NON-COVERED ITEM OR SERVICE: HCPCS | Performed by: PSYCHIATRY & NEUROLOGY

## 2020-12-02 PROCEDURE — A9270 NON-COVERED ITEM OR SERVICE: HCPCS | Performed by: STUDENT IN AN ORGANIZED HEALTH CARE EDUCATION/TRAINING PROGRAM

## 2020-12-02 PROCEDURE — 700111 HCHG RX REV CODE 636 W/ 250 OVERRIDE (IP): Performed by: STUDENT IN AN ORGANIZED HEALTH CARE EDUCATION/TRAINING PROGRAM

## 2020-12-02 PROCEDURE — 36415 COLL VENOUS BLD VENIPUNCTURE: CPT

## 2020-12-02 PROCEDURE — 700111 HCHG RX REV CODE 636 W/ 250 OVERRIDE (IP): Performed by: INTERNAL MEDICINE

## 2020-12-02 PROCEDURE — A9270 NON-COVERED ITEM OR SERVICE: HCPCS | Performed by: INTERNAL MEDICINE

## 2020-12-02 PROCEDURE — 97606 NEG PRS WND THER DME>50 SQCM: CPT

## 2020-12-02 PROCEDURE — 99232 SBSQ HOSP IP/OBS MODERATE 35: CPT | Performed by: INTERNAL MEDICINE

## 2020-12-02 PROCEDURE — 302102 BAG OST N IMG 2.25IN 2PC (FECAL): Performed by: STUDENT IN AN ORGANIZED HEALTH CARE EDUCATION/TRAINING PROGRAM

## 2020-12-02 PROCEDURE — 302098 PASTE RING (FLAT): Performed by: STUDENT IN AN ORGANIZED HEALTH CARE EDUCATION/TRAINING PROGRAM

## 2020-12-02 PROCEDURE — 700102 HCHG RX REV CODE 250 W/ 637 OVERRIDE(OP): Performed by: STUDENT IN AN ORGANIZED HEALTH CARE EDUCATION/TRAINING PROGRAM

## 2020-12-02 PROCEDURE — 700105 HCHG RX REV CODE 258: Performed by: STUDENT IN AN ORGANIZED HEALTH CARE EDUCATION/TRAINING PROGRAM

## 2020-12-02 PROCEDURE — 82962 GLUCOSE BLOOD TEST: CPT | Mod: 91

## 2020-12-02 PROCEDURE — 700102 HCHG RX REV CODE 250 W/ 637 OVERRIDE(OP): Performed by: INTERNAL MEDICINE

## 2020-12-02 PROCEDURE — 700102 HCHG RX REV CODE 250 W/ 637 OVERRIDE(OP): Performed by: PSYCHIATRY & NEUROLOGY

## 2020-12-02 PROCEDURE — 85025 COMPLETE CBC W/AUTO DIFF WBC: CPT

## 2020-12-02 PROCEDURE — 99232 SBSQ HOSP IP/OBS MODERATE 35: CPT | Performed by: STUDENT IN AN ORGANIZED HEALTH CARE EDUCATION/TRAINING PROGRAM

## 2020-12-02 PROCEDURE — 80048 BASIC METABOLIC PNL TOTAL CA: CPT

## 2020-12-02 PROCEDURE — 700105 HCHG RX REV CODE 258: Performed by: INTERNAL MEDICINE

## 2020-12-02 PROCEDURE — 770001 HCHG ROOM/CARE - MED/SURG/GYN PRIV*

## 2020-12-02 RX ORDER — INSULIN GLARGINE 100 [IU]/ML
15 INJECTION, SOLUTION SUBCUTANEOUS
Status: DISCONTINUED | OUTPATIENT
Start: 2020-12-03 | End: 2020-12-17 | Stop reason: HOSPADM

## 2020-12-02 RX ADMIN — CEFTRIAXONE SODIUM 2 G: 2 INJECTION, POWDER, FOR SOLUTION INTRAMUSCULAR; INTRAVENOUS at 06:00

## 2020-12-02 RX ADMIN — KETOCONAZOLE: 20 CREAM TOPICAL at 06:11

## 2020-12-02 RX ADMIN — LEVOTHYROXINE SODIUM 112 MCG: 112 TABLET ORAL at 06:01

## 2020-12-02 RX ADMIN — Medication 400 MG: at 17:15

## 2020-12-02 RX ADMIN — INSULIN HUMAN 4 UNITS: 100 INJECTION, SOLUTION PARENTERAL at 11:17

## 2020-12-02 RX ADMIN — TRAZODONE HYDROCHLORIDE 50 MG: 50 TABLET ORAL at 20:48

## 2020-12-02 RX ADMIN — METOPROLOL SUCCINATE 75 MG: 50 TABLET, EXTENDED RELEASE ORAL at 06:01

## 2020-12-02 RX ADMIN — MODAFINIL 100 MG: 100 TABLET ORAL at 06:01

## 2020-12-02 RX ADMIN — RISPERIDONE 0.5 MG: 1 TABLET ORAL at 06:02

## 2020-12-02 RX ADMIN — HYDROCODONE BITARTRATE AND ACETAMINOPHEN 1 TABLET: 10; 325 TABLET ORAL at 23:43

## 2020-12-02 RX ADMIN — FLUCONAZOLE 400 MG: 200 TABLET ORAL at 06:01

## 2020-12-02 RX ADMIN — FUROSEMIDE 20 MG: 10 INJECTION, SOLUTION INTRAMUSCULAR; INTRAVENOUS at 15:42

## 2020-12-02 RX ADMIN — RISPERIDONE 1 MG: 1 TABLET ORAL at 17:15

## 2020-12-02 RX ADMIN — CETIRIZINE HYDROCHLORIDE 10 MG: 10 TABLET, FILM COATED ORAL at 06:02

## 2020-12-02 RX ADMIN — METOPROLOL SUCCINATE 75 MG: 50 TABLET, EXTENDED RELEASE ORAL at 17:22

## 2020-12-02 RX ADMIN — FAMOTIDINE 20 MG: 20 TABLET, FILM COATED ORAL at 06:01

## 2020-12-02 RX ADMIN — LISINOPRIL 20 MG: 20 TABLET ORAL at 06:01

## 2020-12-02 RX ADMIN — VANCOMYCIN HYDROCHLORIDE 1000 MG: 500 INJECTION, POWDER, LYOPHILIZED, FOR SOLUTION INTRAVENOUS at 11:58

## 2020-12-02 RX ADMIN — INSULIN GLARGINE 10 UNITS: 100 INJECTION, SOLUTION SUBCUTANEOUS at 06:12

## 2020-12-02 RX ADMIN — FUROSEMIDE 20 MG: 10 INJECTION, SOLUTION INTRAMUSCULAR; INTRAVENOUS at 06:03

## 2020-12-02 RX ADMIN — SPIRONOLACTONE 25 MG: 50 TABLET ORAL at 06:02

## 2020-12-02 RX ADMIN — AMIODARONE HYDROCHLORIDE 200 MG: 200 TABLET ORAL at 06:01

## 2020-12-02 RX ADMIN — INSULIN HUMAN 3 UNITS: 100 INJECTION, SOLUTION PARENTERAL at 00:21

## 2020-12-02 RX ADMIN — HYDROCODONE BITARTRATE AND ACETAMINOPHEN 1 TABLET: 10; 325 TABLET ORAL at 14:33

## 2020-12-02 RX ADMIN — Medication 400 MG: at 06:02

## 2020-12-02 ASSESSMENT — ENCOUNTER SYMPTOMS
RESPIRATORY NEGATIVE: 1
SHORTNESS OF BREATH: 0
ABDOMINAL PAIN: 1
NEUROLOGICAL NEGATIVE: 1
HEARTBURN: 0
CHILLS: 0
NAUSEA: 0
CARDIOVASCULAR NEGATIVE: 1
MYALGIAS: 1
DIARRHEA: 0
VOMITING: 0
CONSTITUTIONAL NEGATIVE: 1
COUGH: 0
FEVER: 0
MUSCULOSKELETAL NEGATIVE: 1
PSYCHIATRIC NEGATIVE: 1
ABDOMINAL PAIN: 0

## 2020-12-02 ASSESSMENT — PAIN DESCRIPTION - PAIN TYPE: TYPE: ACUTE PAIN

## 2020-12-02 NOTE — PROGRESS NOTES
Pharmacy Kinetics      55 y.o. male on Vancomycin Day # 14           2020     Currently on Vancomycin 1,000 mg iv q24hr (1230)     Provider specified end date: Per ID - Tentatively planned for 10 days from date of last surgery/ source control (drains placed - tentative stop date of )     Indication for Treatment: IAI     Pertinent history per medical record: Admitted on 11/3/2020 for  concern of abdominal pain after surgical intervention. Concern for subsequent colonic perforation and possible abscess. ID and surgery consulted.     Other antibiotics: Ceftriaxone 2 grams q24H, fluconazole 400 mg q24H     Allergies: Daptomycin, Pcn [penicillins], Unasyn [ampicillin-sulbactam sodium], and Vancomycin      List concerns for renal function: BMI, BUN:SCr> 20:1     Pertinent cultures to date:   11/10/20: Colon Perforation - Light Growth - Enterococcus faecalis and Candida albicans   20: Joya-splenic drain - Rare Growth - Enterococcus faecalis     MRSA nares swab if pneumonia is a concern (ordered/positive/negative/n-a): N/A    Recent Labs     20  0702   WBC 9.6   NEUTSPOLYS 63.10     Recent Labs     20  0525 20  0702   BUN 21 16   CREATININE 0.97 0.95   ALBUMIN 2.3*  --      Recent Labs     20  0813   VANCOTROUGH 17.9       Intake/Output Summary (Last 24 hours) at 2020 1541  Last data filed at 2020 0700  Gross per 24 hour   Intake 760 ml   Output 1760 ml   Net -1000 ml      BP (!) 95/60   Pulse 98   Temp 36.8 °C (98.2 °F) (Temporal)   Resp 19   Ht 1.829 m (6')   Wt (!) 194.5 kg (428 lb 12.7 oz)   SpO2 92%  Temp (24hrs), Av.9 °C (98.5 °F), Min:36.8 °C (98.2 °F), Max:37.3 °C (99.1 °F)      A/P   1. Vancomycin dose change: No. Dose change 20.   2. Next vancomycin level: TBD pending tentative stop date of 20.   3. Goal trough: 10 - 15 mcg/mL   4. Comments: Renal indices stable. Vancomycin level resulted above goal range as outlined above at 17 mcg/mL  yesterday following dose reduction on 11/27. Further dose reduction to 1,000 mg IV q24h - first dose administered today. Repeat level pending continuation.     Ashley Shetty, PharmD, BCPS

## 2020-12-02 NOTE — PROGRESS NOTES
Infectious Disease Progress Note    Author: Cristy Holley M.D. Date & Time of service: 12/2/2020  12:17 PM    Chief Complaint:  Intrabdominal abscess and bacteremia     Interval History:   55 y.o. diabetic male admitted 11/3/2020.+ A. fib, CHD, and severe morbid obesity.  Seen by ID in the past for right foot abscess with cultures positive for MRSA and Proteus.  s/p GLF approximately 1 month ago and was admitted to [facility] with left upper quadrant pain.  CT at the time revealed a subcapsular splenic hematoma. Presented to ED complaining of abdominal pain.  11/9 AF WBC 9.4 Less pain in side but remains tender to touch. Denies SE Zyvox-counseled about sxs serotonin syndrome  11/11 T-max 99.7 WBC 15.4 patient underwent colectomy, colostomy placement and I&D of flank wound with wound VAC placement on 11/10/2020 by Dr. Desouza.  Postop course complicated by hypotension and transferred to the ICU.  On pressors.  Patient complaining of shortness of breath as well as lightheadedness.  Also has postop abdominal pain.  11/12 afebrile WBC 13.3.  Patient weaned off pressors overnight.  Operative cultures growing group D Enterococcus and yeast.  Patient feeling better today.  Improved shortness of breath.  Abdominal pain stable  11/13 afebrile WBC 13.8 patient continues to feel better.  He is inquiring when he can have food by mouth.  11/14 afebrile WBC 9.9.  Patient feeling a bit better today.  His diet has been slowly advanced.  He is however complaining of some sharp abdominal pain when he coughs  11/16  AF, O2 RA, abdominal pain and poor appetite.   11/17 AF, O2 1 L NC , WBC 19.1. Drain output 465 cc yesterday. Ongoing nausea, poor appetite and abdomen is quite tender on palpation.  Will broaden antibiotics.   11/18 AF, O2 1 L NC, WBC 18.1, Drain output- 610 cc. Patient still has abdominal pain and significant nausea.  Examined abdominal wound photos and discussed with wound care team.  He has a large wound but  clean appearing base with granulation tissue.  No obvious infection in the surrounding tissues.  Left side wound with packing in place, ongoing drainage but clearing .    AF, tachycardia and hypertensive , WBC 17.2, drain output-205 cc yesterday. C/o nausea and agree with plan to transition Flagyl to IV to see if this helps.     AF WBC 13.6 feels OK-abd pain controlled. Poor appetite and not eating much. Denies SE abx-nausea resolved   AF WBC 16 no new complaints-no new cough, dyspnea and abd pain the same. Appetite so-so.   AF WBC 13 CT reviewed H and H continues to decrease. Somnolent today-no acute events overnight   AF 11.8 had new drain placed-tolerated well-still poor appetite    AF WBC 11.4 cxs with enterococcus-tired today. No new complaints   AF WBC 9.6 c/o itching face and back. Pain controlled    AF PICC bandage itchy-VAC changed this am-pictures reviewed. No new complaints   AF suicidal ideation and seeing spirit dog noted-psych eval ordered   AF WBC 9.6 has sitter-feeling a little more positive. Rash on face flaking    Review of Systems:  Review of Systems   Constitutional: Negative for chills and fever.   Respiratory: Negative for cough and shortness of breath.    Gastrointestinal: Positive for abdominal pain. Negative for diarrhea, nausea and vomiting.   Musculoskeletal: Positive for joint pain and myalgias.   Skin: Positive for itching and rash.   Psychiatric/Behavioral:        Feels less depressed today   All other systems reviewed and are negative.      Hemodynamics:  Temp (24hrs), Av °C (98.6 °F), Min:36.8 °C (98.2 °F), Max:37.3 °C (99.1 °F)  Temperature: 36.8 °C (98.2 °F)  Pulse  Av.9  Min: 51  Max: 161   Blood Pressure: 110/73      Physical Exam:  Physical Exam  Vitals signs and nursing note reviewed.   Constitutional:       General: He is not in acute distress.     Appearance: He is obese. He is not ill-appearing, toxic-appearing or  diaphoretic.   HENT:      Nose: No rhinorrhea.      Mouth/Throat:      Pharynx: No oropharyngeal exudate.   Eyes:      General: No scleral icterus.     Extraocular Movements: Extraocular movements intact.      Pupils: Pupils are equal, round, and reactive to light.   Neck:      Musculoskeletal: Neck supple. No muscular tenderness.   Cardiovascular:      Rate and Rhythm: Tachycardia present. Rhythm irregular.   Pulmonary:      Effort: Pulmonary effort is normal. No respiratory distress.      Breath sounds: No stridor. No wheezing.      Comments: Line right chest removed  Abdominal:      General: There is no distension.      Palpations: Abdomen is soft.      Tenderness: There is abdominal tenderness. There is no guarding.      Comments: Wound VAC in place, no sign infection in surrounding tissue-measure 11 X7 X 3 cm  Ostomy  Drain serosanguinous   Musculoskeletal:      Right lower leg: Edema present.      Left lower leg: Edema present.   Skin:     General: Skin is warm.      Coloration: Skin is not jaundiced.      Findings: Bruising and rash present.      Comments: Rash on face-not on chest or abd. Macularpapular, flaking   Unable to visualize back   Neurological:      General: No focal deficit present.      Mental Status: He is alert and oriented to person, place, and time.   Psychiatric:         Mood and Affect: Mood normal.         Meds:    Current Facility-Administered Medications:   •  cefTRIAXone (ROCEPHIN) IV  •  fluconazole  •  vancomycin  •  ketoconazole  •  furosemide  •  acetaminophen  •  diphenhydrAMINE  •  metoclopramide  •  risperiDONE  •  risperiDONE  •  magnesium oxide  •  modafinil  •  levothyroxine  •  HYDROcodone/acetaminophen  •  prochlorperazine  •  metoprolol SR  •  lisinopril  •  amiodarone  •  traZODone  •  Metoprolol Tartrate  •  Respiratory Therapy Consult  •  MD Alert...Vancomycin per Pharmacy  •  spironolactone  •  cetirizine  •  famotidine  •  insulin glargine  •  insulin regular **AND**  POC Blood Glucose **AND** NOTIFY MD and PharmD **AND** glucose **AND** dextrose 50%  •  albuterol    Labs:  Recent Labs     12/02/20  0702   WBC 9.6   RBC 3.38*   HEMOGLOBIN 8.7*   HEMATOCRIT 29.0*   MCV 85.8   MCH 25.7*   RDW 57.1*   PLATELETCT 472*   MPV 10.2   NEUTSPOLYS 63.10   LYMPHOCYTES 21.50*   MONOCYTES 7.30   EOSINOPHILS 7.10*   BASOPHILS 0.70     Recent Labs     11/30/20  0525 12/02/20  0702   SODIUM 130* 135   POTASSIUM 4.3 4.0   CHLORIDE 92* 94*   CO2 34* 33   GLUCOSE 132* 141*   BUN 21 16     Recent Labs     11/30/20  0525 12/02/20  0702   ALBUMIN 2.3*  --    TBILIRUBIN 0.5  --    ALKPHOSPHAT 137*  --    TOTPROTEIN 5.6*  --    ALTSGPT 10  --    ASTSGOT 19  --    CREATININE 0.97 0.95       Imaging:  Ct-abdomen-pelvis W/o    Result Date: 11/17/2020 11/17/2020 1:51 PM HISTORY/REASON FOR EXAM:  Nausea/vomiting; elevated WBC; R/O abscess. TECHNIQUE/EXAM DESCRIPTION: CT scan of the abdomen and pelvis without contrast. Noncontrast helical scanning was obtained from the diaphragmatic domes through the pubic symphysis. Low dose optimization technique was utilized for this CT exam including automated exposure control and adjustment of the mA and/or kV according to patient size. COMPARISON: 11/8/2020. FINDINGS: There is a moderate left and small right pleural effusion with overlying atelectasis/consolidation. Trace free air is seen in the abdomen. Evaluation of parenchymal and vascular structures is limited by the lack of intravenous contrast. The liver appears  unremarkable. There are gallstones within the gallbladder. There is hypodensity along the anterior aspect of the spleen measuring approximately 4.3 x 2.9 cm. No adrenal mass is identified. There is right renal atrophy with cortical scarring. There is no  evidence of hydronephrosis. Pancreas appears unremarkable. Aorta is not aneurysmal. There are small inguinal, retroperitoneal and mesenteric lymph nodes. There is no evidence of bowel obstruction.  Visualized appendix appears unremarkable. There is a left abdominal stoma. There is parastomal density measuring up to 2.8 x 2.1 cm likely representing a postsurgical hematoma. There is a small amount of free fluid in the abdomen and pelvis. Bladder is mildly distended. There is a surgical drain in the left abdomen. There is third spacing. Degenerative changes are seen in the spine. Compression deformities of T11 and T12 are again noted. Bones are demineralized.     Postsurgical changes with a left abdominal stoma. Parastomal densities may be related to postsurgical hematoma measuring up to 2.8 x 2.1 cm. Small amount of free fluid in the abdomen and pelvis. Collection along the anterior spleen measures approximately 2.9 x 4.3 cm is decreased in size compared to the prior examination. Evaluation is limited by lack of intravenous contrast. Third spacing. Trace free intraperitoneal air is likely related to recent surgery. Moderate left and small right pleural effusions with overlying atelectasis/consolidation. Cortical scarring of the right kidney. Cholelithiasis.     Ct-abdomen-pelvis With    Result Date: 11/9/2020 11/8/2020 11:34 PM HISTORY/REASON FOR EXAM:  Abdominal infection suspected; Has perisplenic abscess with possible connection to bowel TECHNIQUE/EXAM DESCRIPTION: CT of the abdomen and pelvis with contrast. Contrast-enhanced helical scanning was obtained from the diaphragmatic domes through the pubic symphysis following the bolus administration of 100 mL of nonionic contrast (Omnipaque 350) without complication. Low dose optimization technique was utilized for this CT exam including automated exposure control and adjustment of the mA and/or kV according to patient size. COMPARISON: 11/3/2020 CT FINDINGS: The visualized lung bases show diminished small left pleural fluid that layers dependently. There is similar middle and left lower lobe atelectasis . Similar multichamber cardiac enlargement CT Abdomen:  There is a new pigtail catheter in the anterior perisplenic fluid collection. The collection does diminish in size now measuring 58 x 33 from 79 x 40 mm. There is still gas within it and it is in direct contact with the splenic flexure of the colon. A small amount of gas is also seen in the more superficial soft tissues inferior to the pigtail catheter which crosses the ninth intercostal space No new abscess is detected Contrast is present in the small bowel but not yet in the transverse colon. No contrast extravasation is seen. No abnormal bowel dilatation The pancreas, adrenal glands and kidneys enhance normally. Mild hepatosplenomegaly Some low-density in the gallbladder likely indicating cholelithiasis There is no lymphadenopathy or aneurysmal change of the aorta. Compression deformities in the spine are again seen with subacute fractures resulting in anterior wedging at T11 and T12 There is diffuse fat stranding indicating anasarca CT Pelvis: Visualized pelvic structures are unremarkable. Prostate is within normal limits for age. No lymphadenopathy. There is no free air or free fluid.     Interval decrease in size of perisplenic abscess. Pigtail catheter now in place Abscess has likely fistulous communication with the abutting splenic flexure of the colon. Colon is otherwise normal in appearance Mild hepatosplenomegaly Decreasing small left pleural effusion Subacute anterior wedge compression fractures in the caudal thoracic spine    Ct-abdomen-pelvis With    Addendum Date: 11/5/2020    Addendum: A prior noncontrast CT of the abdomen and pelvis from [facility] dated 10/19/2020 was made available for comparison. On the prior study a perisplenic/subcapsular fluid collection was seen however did not contain air at that time and there was no discrete evidence of communication with the transverse colon. Communication with splenic flexure of the colon and air within the collection are new from the  prior study.    Result Date: 11/5/2020  11/3/2020 4:02 PM HISTORY/REASON FOR EXAM:  Abd pain, unspecified; IV contrast only. TECHNIQUE/EXAM DESCRIPTION:   CT scan of the abdomen and pelvis with contrast. Contrast-enhanced helical scanning was obtained from the diaphragmatic domes through the pubic symphysis following the bolus administration of nonionic contrast without complication. 100 mL of Omnipaque 350 nonionic contrast was administered without complication. Low dose optimization technique was utilized for this CT exam including automated exposure control and adjustment of the mA and/or kV according to patient size. COMPARISON: 9/26/2018 FINDINGS: Chest Base: Small left pleural effusion with mild bibasilar atelectasis. Heart is mildly enlarged. Liver:  Diffuse hypoattenuation of the liver suggesting fatty infiltration. Moderately enlarged. Gallbladder: Cholelithiasis. Biliary tract: Nondilated. Pancreas: Normal. Spleen: There is a rim-enhancing perisplenic fluid collection which contains air and which communicates with the splenic flexure of the colon. This most likely represents an abscess and measures about 8.7 x 7.9 cm. The anterior aspect of the spleen is nonenhancing with irregular margins could be related to infection of splenic parenchyma or infarction. Adrenals: Normal. Kidneys and Collecting Systems:  There is mild right renal cortical scarring. No hydronephrosis. No renal mass. Gastrointestinal tract:  No bowel obstruction. The appendix is normal. Peritoneum: No free air or free fluid. Reproductive organs:  Normal. Bladder:  Normal. Vessels:  Normal caliber. Lymph  Nodes:  No lymphadenopathy. Abdominal wall: Within normal limits. Bones:  Age indeterminate possibly subacute to chronic T11 and T12 superior endplate compression fractures.     1.  Peripherally enhancing perisplenic fluid collection which communicates with the splenic flexure of the colon and which contains foci of air is most consistent  with an abscess/infected fluid collection. There is nonenhancement of the anterior aspect of the spleen suggesting infection/infarction. Surgical consultation is recommended. 2.  Hepatomegaly and hepatic steatosis. 3.  Cholelithiasis. 4.  Right renal cortical scarring. No hydronephrosis. 5.  Small left pleural effusion. Mild bibasilar atelectasis. 6.  Subacute to chronic appearing T11 and T12 superior endplate compression fractures. These findings were discussed with SVETLANA PAREDES on 11/3/2020 4:49 PM.     Ct-drain-peritoneal    Result Date: 11/4/2020 11/4/2020 5:19 PM HISTORY/REASON FOR EXAM:  Joya Splenic Fluid Collection TECHNIQUE/EXAM DESCRIPTION: LUQ parasplenic abscess drainage with CT guidance. Low dose optimization technique was utilized for this CT exam including automated exposure control and adjustment of the mA and/or kV according to patient size. PROCEDURE: Informed consent was obtained. SEDATION: Moderate sedation was provided. Pulse oximetry and continuous cardiac monitoring by the nurse was performed throughout the exam. Intraservice time was 30 minutes. Localizing CT images were obtained with the patient in supine position. The skin was prepped with Betadine and draped in a sterile fashion. Following local anesthesia with 1% Lidocaine, a 17 G guiding needle was placed and needle path confirmed with CT. An Amplatz guidewire was placed and following serial tract dilatation, a 10 Mexican pigtail locking catheter was placed. A specimen was collected and submitted for culture and sensitivity and Gram stain. Total of 6 mL of Brownish fluid was drained. The catheter was secured to the skin and connected to suction bulb drainage. Final CT images were obtained documenting catheter position. The patient tolerated the procedure well with no evidence of complication. COMPARISON: CT scan abdomen and pelvis 11/3/2020 FINDINGS: The final CT images show satisfactory catheter position within the target  collection.     1.  CT guided perisplenic abscess catheter drainage. 2.  The current plan is to obtain a follow-up CT in 5-7 days..    [id number] View    Result Date: 11/20/2020 11/20/2020 2:00 PM HISTORY/REASON FOR EXAM:  Abdominal Pain. TECHNIQUE/EXAM DESCRIPTION AND NUMBER OF VIEWS:  1 view(s) of the abdomen. COMPARISON: None FINDINGS: No evidence for small bowel obstruction. Surgical clips present in the mid abdomen. LEFT mid abdominal stoma noted. Surgical drain present in the LEFT lateral abdomen. Catheter projects of the RIGHT midabdomen, possibly wound VAC. Degenerative change of lumbar spine.     1.  No evidence of bowel obstruction. 2.  Postoperative changes as described.    Dx-chest-portable (1 View)    Result Date: 11/17/2020 11/17/2020 9:19 AM HISTORY/REASON FOR EXAM: Elevated white blood cell count. TECHNIQUE/EXAM DESCRIPTION AND NUMBER OF VIEWS: Single portable view of the chest. COMPARISON: 11/10/2020 FINDINGS: There are bilateral interstitial and airspace infiltrates. There is small bilateral pleural effusion. The heart is enlarged. Right subclavian central line is again seen.     1.  Bilateral airspace and interstitial infiltrates. 2.  Cardiomegaly.    Dx-chest-portable (1 View)    Result Date: 11/10/2020  11/10/2020 12:29 PM HISTORY/REASON FOR EXAM: new central line. TECHNIQUE/EXAM DESCRIPTION AND NUMBER OF VIEWS: Single AP view of the chest. COMPARISON: November 3 FINDINGS: Hardware: ] Tip overlies the cavoatrial junction Lungs: Linear middle lobe and left basilar opacity is seen. Volumes are low Pleura:  No pneumothorax is identified Heart and mediastinum: There is stable cardiac silhouette enlargement.     No pneumothorax identified following right subclavian line placement Worsening atelectasis with consolidation at the bases not excluded Stable cardiac silhouette enlargement    Dx-chest-portable (1 View)    Result Date: 11/3/2020  11/3/2020 3:41 PM HISTORY/REASON FOR EXAM:  Chest Pain.  TECHNIQUE/EXAM DESCRIPTION AND NUMBER OF VIEWS: Single portable view of the chest. COMPARISON: September 10, 2020 FINDINGS: The cardiac size is enlarged. There is interstitial prominence. Lung aeration has improved. No pneumothorax. No obvious pleural effusion.     Slight interval interval improvement in congestive heart failure.    Us-renal    Result Date: 11/11/2020 11/11/2020 6:21 PM HISTORY/REASON FOR EXAM:  Abnormal Labs TECHNIQUE/EXAM DESCRIPTION: Renal ultrasound. COMPARISON:  None FINDINGS: The right kidney measures 9.39 cm. The left kidney measures 9.54 cm. There is no hydronephrosis. There are no abnormal calcifications. The bladder not fully the time of imaging.      Examination was limited due to poor sound penetration related to body habitus. Grossly normal appearance of the kidneys.    Ec-echocardiogram Complete W/ Cont    Result Date: 11/11/2020  Transthoracic Echo Report Echocardiography Laboratory CONCLUSIONS Normal left ventricular chamber size. Moderately reduced left ventricular systolic function. Left ventricular ejection fraction is visually estimated to be 30-40%; variable related to atrial fibrillation. Mild mitral regurgitation. Moderately dilated right ventricle. Reduced right ventricular systolic function. Right heart pressures are normal. No prior study is available for comparison. LV EF:        % FINDINGS Left Ventricle 3 mL of contrast was administered. Contrast was used to enhance visualization of the endocardial border. Existing IV was used. Mild concentric left ventricular hypertrophy. Normal left ventricular chamber size.  Moderately reduced left ventricular systolic function. Left ventricular ejection fraction is visually estimated to be 30-40%. Diastolic function is difficult to assess with atrial fibrillation. Right Ventricle Moderately dilated right ventricle. Reduced right ventricular systolic function. Right Atrium Enlarged right atrium. Normal inferior vena cava size and  inspiratory collapse. Left Atrium Normal left atrial size. Mitral Valve Structurally normal mitral valve. Mild mitral regurgitation. No mitral stenosis. Aortic Valve Tricuspid aortic valve. Structurally normal aortic valve. No stenosis or regurgitation seen. Tricuspid Valve Structurally normal tricuspid valve. Mild tricuspid regurgitation. Right atrial pressure is estimated to be 3 mmHg. Estimated right ventricular systolic pressure  is 25 mmHg. No tricuspid stenosis. Right heart pressures are normal. Pulmonic Valve Structurally normal pulmonic valve without significant stenosis or regurgitation. Pericardium Normal pericardium without effusion. Aorta The aortic root is normal.  Ascending aorta diameter is 3.6 cm. Germain Pacheco M.D. (Electronically Signed) Final Date:     11 November 2020                 12:38 Amended:        11 November 2020 12:40    CT scan 11/24  CT Abdomen:  The liver, adrenal glands, and pancreas are unremarkable.     There are multiple nitrogen-containing calculi in the gallbladder.     Hypodense collection in the anterior spleen has increased in size to approximately 5.8 x 4.6 cm, previously 4.2 x 2.9 cm. There is stranding in the adjacent fat.     The kidneys enhance symmetrically. There is right renal scarring.     Left abdominal colostomy is again noted with small adjacent hematomas in the subcutaneous fat. There is residual contrast in the distal colon. No contrast extravasation or pneumoperitoneum is appreciated.     There are calcifications in the arteries.     Micro:  Results     Procedure Component Value Units Date/Time    CULTURE WOUND W/ GRAM STAIN [[id number]]  (Abnormal)  (Susceptibility) Collected: 11/25/20 1652    Order Status: Completed Specimen: Wound Updated: 11/28/20 1602     Significant Indicator POS     Source WND     Site Joya-splenic drain     Culture Result Growth noted after further incubation, see below for  organism identification.       Gram Stain Result  Moderate WBCs.  Rare Gram positive cocci.       Culture Result Enterococcus faecalis  Rare growth      Narrative:      Collected By:[id number] ERLIN SPARROW  Joya-splenic collection.  Collected By:[id number] ERLIN SPARROW    Susceptibility     Enterococcus faecalis (1)     Antibiotic Interpretation Microscan Method Status    Daptomycin Sensitive 4 mcg/mL ISAMAR Final    Ampicillin Sensitive <=2 mcg/mL ISAMAR Final    Gent Synergy Sensitive <=500 mcg/mL ISAMAR Final    Vancomycin Sensitive 1 mcg/mL ISAMAR Final    Penicillin Sensitive 2 mcg/mL ISAMAR Final                   GRAM STAIN [[id number]] Collected: 11/25/20 1652    Order Status: Completed Specimen: Wound Updated: 11/26/20 0944     Significant Indicator .     Source WND     Site Joya-splenic drain     Gram Stain Result Moderate WBCs.  Rare Gram positive cocci.      Narrative:      Collected By:[id number] ERLIN SPARROW  Joya-splenic collection.  Collected By:[id number] ERLIN SPARROW    FLUID CULTURE W/GRAM STAIN [[id number]] Collected: 11/25/20 1705    Order Status: Sent Specimen: Body Fluid from Peritoneal Fluid     FLUID CULTURE W/GRAM STAIN [[id number]] Collected: 11/25/20 1625    Order Status: Canceled Specimen: Other from Peritoneal Fluid           Assessment:  Active Hospital Problems    Diagnosis   • *Spontaneous perforation of colon ([facility]) [K63.1]   • Leucocytosis [D72.829]   • Schizophrenia ([facility]) [F20.9]   • Sepsis ([facility]) [A41.9]   • Elevated digoxin level [R78.89]   • SRUTHI (acute kidney injury) ([facility]) [N17.9]   • Adrenal insufficiency ([facility]) [E27.40]   • LV dysfunction [I51.9]   • Paroxysmal A-fib with RVR ([facility]) [I48.0]   • Type 2 diabetes mellitus, without long-term current use of insulin, with peripheral neuropathy ([facility]) [E11.9]   • Hypothyroidism [E03.9]   • Prolonged QT interval [R94.31]     Hospital Course:   CT scan on 11/3 revealed a peripherally enhancing perisplenic fluid collection measuring 8.7 x 7.9 cm, communicating with the splenic flexure of the colon and  contained foci of air consistent with abscess/infected fluid.  Also noted nonenhancing of the anterior aspect of the spleen suggesting infection/infarction.    Status post surgery on 11/10/2020    Leukocytosis -Decreased     Acute kidney injury- improved   Renal ultrasound-unremarkable  Nephrology following  Avoid nephrotoxins       Perisplenic abscess with possible communication to the bowel  Left upper quadrant abscess  s/p ground-level fall complicated by a subcapsular splenic hematoma  Afebrile  Splenic enhancement on CT, suggesting infection or infarction  -s/p drainage 11/4 and peritoneal fluid Efaecalis, ampicillin sensitive and C albicans  S/p segmental colectomy, colostomy, irrigation and debridement of flank wound and wound VAC placement on 11/10/2020 by Dr. Desouza.  Patient found to have a left upper quadrant abscess inferior to the spleen with a large bowel wall defect.  Also noted to have an abdominal wall abscess.  Op cultures (colon perforation) - E faecalis and Candida albicans.    -CT abdomen pelvis without on 11/17-hypodensity along anterior aspect of the spleen measuring 4.3 x 2.9 cm.  Parastomal density measuring two-point by 2.1 cm likely representing postsurgical hematoma.  This is decreased in size from prior.  Small amount of free fluid in the abdomen pelvis.  -Drain removed on 11/21  -CT 11/24 with enlarging anterior splenic fluid collection  Deemed nonsurical-  -11/25 new drain placed by IR. Culture + Efaecalis  -Wound granulating     Continue vancomycin to treat the Enterococcus (PCN allergic) for now-(prior Efaecalis)-monitor renal function  Continue ceftriaxone to provide gram-negative coverage.    Continue fluconazole 400 mg daily  for the Candida  Anticipate 10 day course from date of last surgery/source control  Tentative stop date 12/5/2020     Rash, improved slightly  Continue Nizoral to face daily for 7-10 days  Encourage OOB    Diabetes mellitus  Keep BS under 150 to help control  current infection  -229        Antibiotic allergies: Daptomycin rash, penicillins rash and hives, tolerates cephalosporins-discussed allergies and he states that he has developed significant rash and hives with penicillins several times, most recently a year and a half ago- will avoid penicillins

## 2020-12-02 NOTE — DISCHARGE PLANNING
Agency/Facility Name: [facility]  Outcome: L/M for Zandra requesting referral status     @1109  Agency/Facility Name: [facility]  Spoke To: Per Epic  Outcome: Declined, no open beds at facility.      @1213  Agency/Facility Name: [facility]  Outcome: L/M for Zandra requesting referral status     @1356  Agency/Facility Name: [facility]  Outcome: L/M for Zandra requesting referral status

## 2020-12-02 NOTE — CONSULTS
"[facility]         Name: Sebastián Castro  MRN: [id number]  : 1965  Age: 55 y.o.  Date of assessment: 2020  PCP: CRYSTAL Terrazas.  Persons in attendance: Patient    INPATIENT BEHAVIORAL HEALTH ASSESSMENT  ORDERED BY Brittany Montana M.D    PRESENTING ISSUE (as stated by Patient): \"I don't want to live like this anymore\". \"I am at the point that I want to give up\".    Chief Complaint   Patient presents with   • Nausea     Recently admitted and discharged home on abx - reports nausea this am; 4mg zofran given in route   • Atrial Fibrillation     hx of afib w/rvr      HPI: Sebastián is a 55 year old man with a history of atrial fibrillation, congestive heart failure, diabetes, and morbid obesity. Patient was seen lying on the hospital bed, alert and oriented x 3, speech was clear, at a fast rate, with a blunted affect. Patient appeared extremely anxious regarding his current medical and living situation. Patient's mood was depressed with a sense of hopelessness. Patient was cooperative during interview. Patient endorses both visual and auditory hallucinations with command voices telling him to kill himself. Patient has a plan to overdose on medications and drink alcohol stating, \"I have plenty of sleeping pills I can take\". Patient expressed frustration with his current medical condition stating, \"I am tired, I have no energy. \"I am just tired of this, all I want to do is slit my wrists!\". Patient reports being accustomed to rising early in the morning, going for walks and reading the newspaper. Patient states, \"I have no motivation to do anything\". Patient expressed a sense of fear and feels he is losing control of his current living situation. Patient states, \"The group home where I was staying says I can't live there anymore because I am too much of a risk\". Patient continues, \"I cannot go back on the streets, I won't make it this time\".    CURRENT LIVING SITUATION/SOCIAL SUPPORT: Patient " reports residing in his current group home for one year. He is concerned that he has no place to discharge to once his medical situation stabilizes. Patient has social security disability as income. He has one brother who resides in [place]. Patient states he was previously  and worked at the [employer] in the 1990's. Patient reports his former wife and children have all passed. This information has not been confirmed. Patient reports no other family or friends.    BEHAVIORAL HEALTH TREATMENT HISTORY  Does patient/parent report a history of prior behavioral health treatment for patient? Patient reports no previous psychiatric history, however old medical records indicate patient has had an active psychiatric history. Patient also appears to have a payee for his disability payments.      SAFETY ASSESSMENT - SELF  Does patient acknowledge current or past symptoms of dangerousness to self?  yes   Does parent/significant other report patient has current or past symptoms of dangerousness to self? N/A  Does presenting problem suggest symptoms of dangerousness to self? Yes    SAFETY ASSESSMENT - OTHERS  Does patient acknowledge current or past symptoms of aggressive behavior or risk to others? No  Does parent/significant other report patient has current or past symptoms of aggressive behavior or risk to others?  N/A  Does presenting problem suggest symptoms of dangerousness to others? No      ABUSE/NEGLECT SCREENING  Does patient report feeling “unsafe” in his/her home, or afraid of anyone? No  Does patient report any history of physical, sexual, or emotional abuse?  No  Does parent or significant other report any of the above? N/A  Is there evidence of neglect by self?  No  Is there evidence of neglect by a caregiver? No  Does the patient/parent report any history of CPS/APS/police involvement related to suspected abuse/neglect or domestic violence? No  Based on the information provided during the current  assessment, is a mandated report of suspected abuse/neglect being made?  No    SUBSTANCE USE SCREENING  Not assessed    MENTAL STATUS   Participation: active  Grooming: appropriate  Orientation:oriented x3  Behavior: anxious  Eye contact: intermittent  Mood: anxious  Affect:blunted  Thought process: positive for auditory and visual hallucinations  Thought content:delusional  Speech: fast paced  Perception:   Memory:  limited  Insight: limited  Judgment: limited  Other:    Collateral information:   Source:  [] Significant other present in person:   [] Significant other by telephone  [] [facility]   [x] [facility]  [x] [facility]  [] Other:     [] Unable to complete full assessment due to:  [] Acute intoxication  [] Patient declined to participate/engage  [] Patient verbally unresponsive  [] Significant cognitive deficits  [] Significant perceptual distortions or behavioral disorganization  [] Other:      CLINICAL IMPRESSIONS:  Primary:  Adjustment disorder with mixed emotions; history of schizophrenia,   Secondary: R/O low intellectual functioning      IDENTIFIED NEEDS/PLAN:     [x]  Imminent safety risk - self [] Imminent safety risk - others   []  Acute substance withdrawal [x]  Psychosis/Impaired reality testing   [x]  Mood/anxiety []  Substance use/Addictive behavior   []  Maladaptive behaviro []  Parent/child conflict   []  Family/Couples conflict []  Biomedical   [x]  Housing []  Financial   []   Legal  Occupational/Educational   []  Domestic violence []  Other:     Assessment:  Sebastián presents as extremely anxious and with a depressed mood, primarily related to his current medical condition and changes in his level of functioning. It is difficult for him to adjust to these changes or anticipate how he can continue his life trying to manage the medical concerns. Patient's intellectual functioning is limited which can contribute to feeling overwhelmed with decompensating conditions.  Patient is also concerned about losing his housing and becoming homeless again. As a result, patient feels hopeless and suicidal. Due to patient's ongoing suicide statements with a plan, he appears to be a risk to his safety and will remain on a Legal hold.    Recommendations:  1. Legal Hold Extended    2. Continue one to one sitter for supervision  3.  to assist patient in contacting payee to investigate group home situation and the need for new housing.  4. Transfer patient to an inpatient psychiatric facility once medically cleared and a bed becomes available.    Thank you    Susan Ewing, Ph.D.  12/1/2020

## 2020-12-02 NOTE — PROGRESS NOTES
Bedside report received. Assessment completed.  Pt is A&O x4. Pt on room air.   Medicating for pain PRN per MAR Pain 5/10  Denies nausea.  Bilateral feet numbness/tingling.  LLQ ostomy + output   ISH midline   Last BM via ostomy 12/2/20 . +flatus,   +void; purewick in place.  Low fiber GI soft diet. Tolerates well.   Pt up max assist.  Call light and belongings within reach. All needs met at this time. Fall Precautions and hourly rounding in place.

## 2020-12-02 NOTE — WOUND TEAM
[facility]  Inpatient Services  Wound and Skin Care Progress note    Admission Date: 11/3/2020     Last order of IP CONSULT TO WOUND CARE was found on 11/16/2020 from Hospital Encounter on 11/3/2020     HPI, PMH, SH: Reviewed    Unit where seen by Wound Team: [id number]     WOUND CONSULT/FOLLOW UP RELATED TO:   Scheduled abdominal NPWT dressing/Ostomy appliance change    Self Report / Pain Level:  Pre-medicated with PO Pain medication tolerated well.    OBJECTIVE:  Wound vac functioning again at 100mmhg, noted small amount of stool over dressing. Per bedside RN appliance was changed twice today due to leaking. Sitter at bedside.     WOUND TYPE, LOCATION, CHARACTERISTICS (Pressure Injuries: location, stage, POA or date identified)    Negative Pressure Wound Therapy 11/10/20 Surgical Abdomen Midline (Active)   Vacuum Serial Number [id number] 11/27/20 2100   NPWT Pump Mode / Pressure Setting Ulta;Continuous;125 mmHg    Dressing Type Medium;Black Foam (Regular)    Number of Foam Pieces Used 3    Canister Changed No    Output (mL) 0 mL    NEXT Dressing Change/Treatment Date 12/04/20    WOUND NURSE ONLY - Time Spent with Patient (mins) 120      Wound 11/10/20 Full Thickness Wound Abdomen Midline wound vac (Active)   Wound Image    11/30/20 0900   Site Assessment Pink;Red    Periwound Assessment Pink    Margins Attached edges    Closure Secondary intention    Drainage Amount Scant    Drainage Description Serosanguineous    Treatments Cleansed;Site care    Wound Cleansing Normal Saline Irrigation    Periwound Protectant Skin Protectant Wipes to Periwound;Drape;Mepitel    Dressing Cleansing/Solutions Not Applicable    Dressing Options Wound Vac    Dressing Changed Changed    Dressing Status Clean;Intact;Dry    Dressing Change/Treatment Frequency Monday, Wednesday, Friday, and As Needed    NEXT Dressing Change/Treatment Date 12/04/20    NEXT Weekly Photo (Inpatient Only) 12/04/20    Number of Staples Removed 10       Non-staged Wound Description Full thickness    Wound Length (cm) 11 cm    Wound Width (cm) 7 cm    Wound Depth (cm) 3 cm    Wound Surface Area (cm^2) 77 cm^2    Wound Volume (cm^3) 231 cm^3    Wound Healing % 69    Wound Bed Granulation (%) 80 %    Tunneling (cm) 5.3 cm    Tunneling Clock Position of Wound 12    Undermining (cm) 0 cm    Shape Oval x1, Round x1    Wound Odor None    Exposed Structures None    WOUND NURSE ONLY - Time Spent with Patient (mins) 75          Lab Values:    Lab Results   Component Value Date/Time    WBC 9.6 12/02/2020 07:02 AM    RBC 3.38 (L) 12/02/2020 07:02 AM    HEMOGLOBIN 8.7 (L) 12/02/2020 07:02 AM    HEMATOCRIT 29.0 (L) 12/02/2020 07:02 AM    CREACTPROT 7.52 (H) 11/29/2020 01:25 AM    SEDRATEWES 13 10/23/2018 08:02 AM    HBA1C 7.8 (H) 11/10/2020 03:31 AM      Culture Results show:  Recent Results (from the past 720 hour(s))   CULTURE WOUND W/ GRAM STAIN    Collection Time: 11/25/20  4:52 PM    Specimen: Wound   Result Value Ref Range    Significant Indicator POS (POS)     Source WND     Site Joya-splenic drain     Culture Result (A)      Growth noted after further incubation, see below for  organism identification.      Gram Stain Result Moderate WBCs.  Rare Gram positive cocci.       Culture Result Enterococcus faecalis  Rare growth   (A)        Susceptibility    Enterococcus faecalis - ISAMAR     Daptomycin 4 Sensitive mcg/mL     Ampicillin <=2 Sensitive mcg/mL     Gent Synergy <=500 Sensitive mcg/mL     Vancomycin 1 Sensitive mcg/mL     Penicillin 2 Sensitive mcg/mL       INTERVENTIONS BY WOUND TEAM:   Chart and images reviewed. Discussed with pt and bedside RN. L flank dressing changed by bedside RN. Pts NPWT dressing removed, no retained foam. Wound cleansed with wound cleanser and gauze. Joya-wound now healed. Joya-wound prepped with no sting skin barrier and drape. Mepitel one strips applied over staples. One piece of half thickness spiraled black foam into 1cm of the depth of  12 O'clock tunnel. 1 pieces of half thickness cut to fit black foam applied over superior wound to fill wound depth. One piece of half thickness spiraled black foam applied into inferior wound and bridged over sutures to superior wound. All exposed foam secured with drape. A hole was cut in drape over superior wound and trac pad applied. Suction resumed at 125mmhg continuous no leaks identified.  Confirmed NPWT vac stayed at 125mmhg continuous prior to leaving room. Ostomy appliance then changed (see note below).    Interdisciplinary consultation: Patient, Bedside RN (Rashel), Wound RN (Delvis)    EVALUATION / RATIONALE FOR TREATMENT: Tunnel noted at 12 O'clock. Vj-skin significantly improved since last assessment. Did not require hydrocolloid or paste ring to vj-skin. Wound bed red with significantly more granular tissue. Tunnel remains present. NPWT continued to assist in granular tissue development while managing drainage.     Goals: Steady decrease in wound area and depth weekly.    NURSING PLAN OF CARE ORDERS (X):    Dressing changes: See Dressing Care orders: X  Skin care: See Skin Care orders: X  RN Prevention Protocol: X  Rectal tube care: See Rectal Tube Care orders:   Other orders:      WOUND TEAM PLAN OF CARE:   Dressing changes by wound team:                   Follow up 3 times weekly:                NPWT change 3 times weekly:  X - abdomen   Follow up 1-2 times weekly:    X -left flank   Follow up Bi-Monthly:                   Follow up as needed:       Other (explain):     Anticipated discharge plans:  LTACH:        SNF/Rehab:     X            Home Health Care:           Outpatient Wound Center:            Self Care:                [facility]  Inpatient Services  New Ostomy Management & Teaching     HPI:  Reviewed  PMH: Reviewed   SH: Reviewed      Objective: appliance intact per bedside RN appliance leaked twice today and had to be changed.     Colostomy 11/10/20 Transverse LUQ  "(Active)   Wound Image   11/30/20 0900   Stomal Appliance Assessment Changed;Clean;Dry;Intact    Stoma Assessment Red    Stoma Shape Round;Budded Less Than One Inch    Stoma Size (in) 1.5    Peristomal Assessment Painful;Red;Clean    Mucocutaneous Junction Intact    Treatment Appliance Changed    Peristomal Protectant Paste Ring;No Sting Skin Prep    Stomal Appliance 2 1/4\" (57mm) CTF;Paste Ring, 2\"    Output (mL) 0 mL    Output Color Brown    WOUND RN ONLY - Stomal Appliance  2 Piece;2 1/4\" (57mm) CTF;Paste Ring, 2\"    Appliance (Pouch) # [id number]    Appliance Brand [employer]    Appliance Supplier [employer]    Secure Start completed Yes    Ostomy Care Resources Provided [facility] Tip Sheet    WOUND NURSE ONLY - Time Spent with Patient (mins) 90       Ostomy Appliance (type and size):  2 piece 2 1/4\" with paste ring, supplies left outside room due to legal hold     Interventions:  Previous appliance removed, cleansed skin with warm wash cloth, dried. New template made. Ostomy RN cut barrier to fit. Peristomal skin intact and did not require crusting. This RN applied paste ring to barrier and then applied barrier to skin. Used friction to adhered barrier to skin. Pouch attached to barrier and pouch end closed.                 Pt education: No education today, pt in pain after just having frye catheter placed.      Evaluation:  Peristomal skin improved since last change. No crusting this change. Pt unable to perform most ostomy care alone. Patient likely to discharge to SNF or Rehab, currently on a legal hold with sitter at bedside. Stoma is viable and producing adequate amounts of stool.     Plan: Ostomy nurses to continue to follow for ostomy needs and teaching      Anticipated discharge needs: Supplies, supplier information, possible HH or outpatient ostomy clinic      "

## 2020-12-02 NOTE — DISCHARGE PLANNING
Filed petition to the court via [facility] [facility]. Waiting on verified petition.    Received verified petition from the court. Sent copy to ELISA GOLDEN Scanned copy into media manager.

## 2020-12-02 NOTE — PROGRESS NOTES
Pharmacy Kinetics      55 y.o. male on Vancomycin Day # 13           2020     Currently on Vancomycin 1,000 mg iv q24hr (1230) - Starting tomorrow.      Provider specified end date: Per ID - Tentatively planned for 10 days from date of last surgery/ source control (drains placed - tentative stop date of )     Indication for Treatment: IAI     Pertinent history per medical record: Admitted on 11/3/2020 for  concern of abdominal pain after surgical intervention. Concern for subsequent colonic perforation and possible abscess. ID and surgery consulted.     Other antibiotics: Ceftriaxone 2 grams q24H, fluconazole 400 mg q24H     Allergies: Daptomycin, Pcn [penicillins], Unasyn [ampicillin-sulbactam sodium], and Vancomycin      List concerns for renal function: BMI, BUN:SCr> 20:1     Pertinent cultures to date:   11/10/20: Colon Perforation - Light Growth - Enterococcus faecalis and Candida albicans   20: Joya-splenic drain - Rare Growth - Enterococcus faecalis     MRSA nares swab if pneumonia is a concern (ordered/positive/negative/n-a): N/A    Recent Labs     20  0125   WBC 9.6   NEUTSPOLYS 62.40     Recent Labs     20  0125 20  0525   BUN 25* 21   CREATININE 0.96 0.97   ALBUMIN 2.3* 2.3*     Recent Labs     20  0813   VANCOTROUGH 17.9       Intake/Output Summary (Last 24 hours) at 2020 1718  Last data filed at 2020 1600  Gross per 24 hour   Intake 960 ml   Output 3050 ml   Net -2090 ml      /80   Pulse 90   Temp 37 °C (98.6 °F) (Temporal)   Resp 18   Ht 1.829 m (6')   Wt (!) 194.5 kg (428 lb 12.7 oz)   SpO2 94%  Temp (24hrs), Av.6 °C (97.8 °F), Min:36.2 °C (97.1 °F), Max:37 °C (98.6 °F)      A/P   1. Vancomycin dose change: Yes.   2. Next vancomycin level: TBD   3. Goal trough: 10 - 15 mcg/mL   4. Comments: Vancomycin level resulted above goal range as outlined above at 17 mcg/mL following dose reduction on . Further dose reduction to 1,000 mg  IV q24h. Tentative stop date 12/5/20. No new renal labs. Ordered BMP for tomorrow to continue monitoring renal function while on vancomycin.     Ashley Shetty, XeniaD, BCPS

## 2020-12-03 LAB
ANION GAP SERPL CALC-SCNC: 7 MMOL/L (ref 7–16)
ANISOCYTOSIS BLD QL SMEAR: ABNORMAL
BASOPHILS # BLD AUTO: 0.8 % (ref 0–1.8)
BASOPHILS # BLD: 0.08 K/UL (ref 0–0.12)
BUN SERPL-MCNC: 16 MG/DL (ref 8–22)
CALCIUM SERPL-MCNC: 8.8 MG/DL (ref 8.5–10.5)
CHLORIDE SERPL-SCNC: 94 MMOL/L (ref 96–112)
CO2 SERPL-SCNC: 33 MMOL/L (ref 20–33)
COMMENT 1642: NORMAL
CREAT SERPL-MCNC: 1.01 MG/DL (ref 0.5–1.4)
EOSINOPHIL # BLD AUTO: 0.79 K/UL (ref 0–0.51)
EOSINOPHIL NFR BLD: 7.8 % (ref 0–6.9)
ERYTHROCYTE [DISTWIDTH] IN BLOOD BY AUTOMATED COUNT: 56.4 FL (ref 35.9–50)
GLUCOSE BLD-MCNC: 119 MG/DL (ref 65–99)
GLUCOSE BLD-MCNC: 136 MG/DL (ref 65–99)
GLUCOSE BLD-MCNC: 141 MG/DL (ref 65–99)
GLUCOSE SERPL-MCNC: 117 MG/DL (ref 65–99)
HCT VFR BLD AUTO: 28.2 % (ref 42–52)
HGB BLD-MCNC: 8.4 G/DL (ref 14–18)
HYPOCHROMIA BLD QL SMEAR: ABNORMAL
IMM GRANULOCYTES # BLD AUTO: 0.06 K/UL (ref 0–0.11)
IMM GRANULOCYTES NFR BLD AUTO: 0.6 % (ref 0–0.9)
LYMPHOCYTES # BLD AUTO: 2.88 K/UL (ref 1–4.8)
LYMPHOCYTES NFR BLD: 28.4 % (ref 22–41)
MCH RBC QN AUTO: 25.5 PG (ref 27–33)
MCHC RBC AUTO-ENTMCNC: 29.8 G/DL (ref 33.7–35.3)
MCV RBC AUTO: 85.5 FL (ref 81.4–97.8)
MONOCYTES # BLD AUTO: 0.84 K/UL (ref 0–0.85)
MONOCYTES NFR BLD AUTO: 8.3 % (ref 0–13.4)
MORPHOLOGY BLD-IMP: NORMAL
NEUTROPHILS # BLD AUTO: 5.49 K/UL (ref 1.82–7.42)
NEUTROPHILS NFR BLD: 54.1 % (ref 44–72)
NRBC # BLD AUTO: 0 K/UL
NRBC BLD-RTO: 0 /100 WBC
PLATELET # BLD AUTO: 535 K/UL (ref 164–446)
PLATELET BLD QL SMEAR: NORMAL
PMV BLD AUTO: 9.9 FL (ref 9–12.9)
POTASSIUM SERPL-SCNC: 4.2 MMOL/L (ref 3.6–5.5)
RBC # BLD AUTO: 3.3 M/UL (ref 4.7–6.1)
RBC BLD AUTO: PRESENT
SODIUM SERPL-SCNC: 134 MMOL/L (ref 135–145)
WBC # BLD AUTO: 10.1 K/UL (ref 4.8–10.8)

## 2020-12-03 PROCEDURE — A9270 NON-COVERED ITEM OR SERVICE: HCPCS | Performed by: INTERNAL MEDICINE

## 2020-12-03 PROCEDURE — 700102 HCHG RX REV CODE 250 W/ 637 OVERRIDE(OP): Performed by: STUDENT IN AN ORGANIZED HEALTH CARE EDUCATION/TRAINING PROGRAM

## 2020-12-03 PROCEDURE — A9270 NON-COVERED ITEM OR SERVICE: HCPCS | Performed by: STUDENT IN AN ORGANIZED HEALTH CARE EDUCATION/TRAINING PROGRAM

## 2020-12-03 PROCEDURE — 700105 HCHG RX REV CODE 258: Performed by: STUDENT IN AN ORGANIZED HEALTH CARE EDUCATION/TRAINING PROGRAM

## 2020-12-03 PROCEDURE — 85025 COMPLETE CBC W/AUTO DIFF WBC: CPT

## 2020-12-03 PROCEDURE — 700102 HCHG RX REV CODE 250 W/ 637 OVERRIDE(OP): Performed by: INTERNAL MEDICINE

## 2020-12-03 PROCEDURE — 770001 HCHG ROOM/CARE - MED/SURG/GYN PRIV*

## 2020-12-03 PROCEDURE — 700105 HCHG RX REV CODE 258: Performed by: INTERNAL MEDICINE

## 2020-12-03 PROCEDURE — 700111 HCHG RX REV CODE 636 W/ 250 OVERRIDE (IP): Performed by: INTERNAL MEDICINE

## 2020-12-03 PROCEDURE — 36415 COLL VENOUS BLD VENIPUNCTURE: CPT

## 2020-12-03 PROCEDURE — 80048 BASIC METABOLIC PNL TOTAL CA: CPT

## 2020-12-03 PROCEDURE — 700111 HCHG RX REV CODE 636 W/ 250 OVERRIDE (IP): Performed by: STUDENT IN AN ORGANIZED HEALTH CARE EDUCATION/TRAINING PROGRAM

## 2020-12-03 PROCEDURE — 700102 HCHG RX REV CODE 250 W/ 637 OVERRIDE(OP): Performed by: PSYCHIATRY & NEUROLOGY

## 2020-12-03 PROCEDURE — 99232 SBSQ HOSP IP/OBS MODERATE 35: CPT | Performed by: STUDENT IN AN ORGANIZED HEALTH CARE EDUCATION/TRAINING PROGRAM

## 2020-12-03 PROCEDURE — 700111 HCHG RX REV CODE 636 W/ 250 OVERRIDE (IP): Performed by: HOSPITALIST

## 2020-12-03 PROCEDURE — A9270 NON-COVERED ITEM OR SERVICE: HCPCS | Performed by: PSYCHIATRY & NEUROLOGY

## 2020-12-03 PROCEDURE — 82962 GLUCOSE BLOOD TEST: CPT | Mod: 91

## 2020-12-03 RX ORDER — FUROSEMIDE 10 MG/ML
20 INJECTION INTRAMUSCULAR; INTRAVENOUS
Status: DISCONTINUED | OUTPATIENT
Start: 2020-12-04 | End: 2020-12-09

## 2020-12-03 RX ORDER — HYDROCODONE BITARTRATE AND ACETAMINOPHEN 5; 325 MG/1; MG/1
1-2 TABLET ORAL EVERY 6 HOURS PRN
Status: DISCONTINUED | OUTPATIENT
Start: 2020-12-03 | End: 2020-12-17 | Stop reason: HOSPADM

## 2020-12-03 RX ADMIN — CEFTRIAXONE SODIUM 2 G: 2 INJECTION, POWDER, FOR SOLUTION INTRAMUSCULAR; INTRAVENOUS at 06:08

## 2020-12-03 RX ADMIN — TRAZODONE HYDROCHLORIDE 50 MG: 50 TABLET ORAL at 20:58

## 2020-12-03 RX ADMIN — Medication 400 MG: at 17:51

## 2020-12-03 RX ADMIN — ACETAMINOPHEN 650 MG: 325 TABLET, FILM COATED ORAL at 11:07

## 2020-12-03 RX ADMIN — PROCHLORPERAZINE EDISYLATE 10 MG: 5 INJECTION INTRAMUSCULAR; INTRAVENOUS at 18:07

## 2020-12-03 RX ADMIN — CETIRIZINE HYDROCHLORIDE 10 MG: 10 TABLET, FILM COATED ORAL at 06:07

## 2020-12-03 RX ADMIN — MODAFINIL 100 MG: 100 TABLET ORAL at 06:05

## 2020-12-03 RX ADMIN — FLUCONAZOLE 400 MG: 200 TABLET ORAL at 06:17

## 2020-12-03 RX ADMIN — Medication 400 MG: at 06:07

## 2020-12-03 RX ADMIN — HYDROCODONE BITARTRATE AND ACETAMINOPHEN 1 TABLET: 5; 325 TABLET ORAL at 13:39

## 2020-12-03 RX ADMIN — FUROSEMIDE 20 MG: 10 INJECTION, SOLUTION INTRAMUSCULAR; INTRAVENOUS at 16:58

## 2020-12-03 RX ADMIN — INSULIN GLARGINE 15 UNITS: 100 INJECTION, SOLUTION SUBCUTANEOUS at 06:00

## 2020-12-03 RX ADMIN — METOPROLOL SUCCINATE 75 MG: 50 TABLET, EXTENDED RELEASE ORAL at 06:05

## 2020-12-03 RX ADMIN — AMIODARONE HYDROCHLORIDE 200 MG: 200 TABLET ORAL at 06:05

## 2020-12-03 RX ADMIN — FAMOTIDINE 20 MG: 20 TABLET, FILM COATED ORAL at 06:06

## 2020-12-03 RX ADMIN — VANCOMYCIN HYDROCHLORIDE 1000 MG: 500 INJECTION, POWDER, LYOPHILIZED, FOR SOLUTION INTRAVENOUS at 13:40

## 2020-12-03 RX ADMIN — METOPROLOL SUCCINATE 75 MG: 50 TABLET, EXTENDED RELEASE ORAL at 17:51

## 2020-12-03 RX ADMIN — RISPERIDONE 0.5 MG: 1 TABLET ORAL at 06:06

## 2020-12-03 RX ADMIN — KETOCONAZOLE: 20 CREAM TOPICAL at 06:04

## 2020-12-03 RX ADMIN — LEVOTHYROXINE SODIUM 112 MCG: 112 TABLET ORAL at 06:07

## 2020-12-03 RX ADMIN — RISPERIDONE 1 MG: 1 TABLET ORAL at 17:51

## 2020-12-03 RX ADMIN — LISINOPRIL 20 MG: 20 TABLET ORAL at 06:07

## 2020-12-03 RX ADMIN — SPIRONOLACTONE 25 MG: 50 TABLET ORAL at 06:04

## 2020-12-03 RX ADMIN — FUROSEMIDE 20 MG: 10 INJECTION, SOLUTION INTRAMUSCULAR; INTRAVENOUS at 06:03

## 2020-12-03 ASSESSMENT — ENCOUNTER SYMPTOMS
VOMITING: 0
CARDIOVASCULAR NEGATIVE: 1
HEARTBURN: 0
RESPIRATORY NEGATIVE: 1
ABDOMINAL PAIN: 0
CONSTITUTIONAL NEGATIVE: 1
NAUSEA: 0
NEUROLOGICAL NEGATIVE: 1
DIARRHEA: 0
MUSCULOSKELETAL NEGATIVE: 1
PSYCHIATRIC NEGATIVE: 1

## 2020-12-03 ASSESSMENT — PAIN DESCRIPTION - PAIN TYPE
TYPE: SURGICAL PAIN
TYPE: ACUTE PAIN
TYPE: SURGICAL PAIN
TYPE: ACUTE PAIN

## 2020-12-03 NOTE — PROGRESS NOTES
Received bedside report from [facility]. 1:1 sitter at bedside. Will continue to monitor.

## 2020-12-03 NOTE — PROGRESS NOTES
Pharmacy Kinetics 55 y.o. male on vancomycin day # 15 12/3/2020    Currently on Vancomycin 1000 mg iv q24hr  Provider specified end date:  Per ID - Tentatively planned for 10 days from date of last surgery/ source control (drains placed - tentative stop date of )    Indication for Treatment: IAI    Pertinent history per medical record: Admitted on 11/3/2020 for  concern of abdominal pain after surgical intervention. Concern for subsequent colonic perforation and possible abscess. ID and surgery consulted.     Other antibiotics: Ceftriaxone 2 grams q24H, fluconazole 400 mg q24H    Allergies: Daptomycin, Pcn [penicillins], Unasyn [ampicillin-sulbactam sodium], and Vancomycin   List concerns for renal function: BMI 58.16 kg/m2; albumin <3; abnormal LFTs    Pertinent cultures to date:   20 - wound (vj-splenic drain): E faecalis  11/10/20 - colon perforation: E faecalis; C albicans    MRSA nares swab if pneumonia is a concern (ordered/positive/negative/n-a): N/A    Recent Labs     20  0702 20  0551   WBC 9.6 10.1   NEUTSPOLYS 63.10 54.10     Recent Labs     20  0702 20  0551   BUN 16 16   CREATININE 0.95 1.01     Recent Labs     20  0813   VANCOTROUGH 17.9     Intake/Output Summary (Last 24 hours) at 12/3/2020 1515  Last data filed at 12/3/2020 1000  Gross per 24 hour   Intake 600 ml   Output 4980 ml   Net -4380 ml      /77   Pulse 97   Temp 36.9 °C (98.4 °F) (Temporal)   Resp 18   Ht 1.829 m (6')   Wt (!) 194.5 kg (428 lb 12.7 oz)   SpO2 90%  Temp (24hrs), Av.6 °C (97.9 °F), Min:36.2 °C (97.1 °F), Max:36.9 °C (98.4 °F)    A/P   1. Vancomycin dose change: Continue vancomycin 1000 mg IV Q24h (due @ 1230)  2. Next vancomycin level: To be determined  3. Goal trough: 10-15 mcg/mL  4. Comments: Tmax 98.4 without leukocytosis. Vital signs and renal indices stable. Last vancomycin trough above goal @ 17.9 mcg/mL, subsequently vanco dose reduced yesterday.  Anticipated vancomycin stop date of 12/5 per ID. Will continue with vancomycin 1000 mg IV Q24h as outlined above and check a repeat level in 2 days if therapy is extended.     Sheron Matias, PharmD, BCOP       thyroid nodule

## 2020-12-03 NOTE — DISCHARGE PLANNING
Agency/Facility Name: [facility]  Outcome: L/M for Zandra requesting referral status     @1152  Agency[facility]ility Name: [facility]  Outcome: L/M for Zandra requesting referral status     @1224  Agency/Facility Name: [facility]  Spoke To: Zandra  Outcome: Pt. Declined due to non-contracted insurance  JASMEET German notified that all LTAC's declined.

## 2020-12-03 NOTE — PROGRESS NOTES
Hospital Medicine Daily Progress Note    Date of Service  12/2/2020    Chief Complaint  55 y.o. male admitted 11/3/2020 with abdominal pain    Hospital Course    55-year-old male with history of schizophrenia, diabetes, heart failure with ejection fraction 30% and atrial fibrillation presented 11/3 with left upper quadrant pain, initially the patient had perisplenic hematoma after fall however he developed fever and severe pain, CT scan showed perisplenic abscess with possible fistula and perforation, patient surgery for colostomy and for bowel perforation, blood culture on 11/31 showed Streptococcus sepsis,  A new drain was placed on 11/25, drain fluid culture from the abscess showed Streptococcus and yeast Candida, patient was evaluated by infectious disease and he was treated with ceftriaxone, fluconazole and later added vancomycin to treat Enterococcus since patient is PCN allergy stop date will be 12/5/2020..     Interval Problem Update  -Seen and examined the patient at bedside, had wound vac and colostomy care by Wound team   -Psych eval done rec legal hold and transfer to psych inpatient, will get re eval by Psych since patient has continued medical needs and will likely need LTAC or SNF.  -The patient has a chronic dyspnea on exertion, he is on room air.  -Labs are reviewed, improving on leukocytosis and kidney function.  -The patient is ready for discharge/placement -Try to insert midline and remove the central line    Consultants/Specialty  General surgeon  Infectious disease    Code Status  Full Code    Disposition  LTAC or SNF  Medically cleared for placement.      Review of Systems  Review of Systems   Constitutional: Negative.    HENT: Negative.    Respiratory: Negative.    Cardiovascular: Negative.    Gastrointestinal: Negative for abdominal pain, diarrhea, heartburn, nausea and vomiting.   Genitourinary: Negative.    Musculoskeletal: Negative.    Skin: Negative.    Neurological: Negative.     Psychiatric/Behavioral: Negative.         Physical Exam  Temp:  [36.8 °C (98.2 °F)-37.3 °C (99.1 °F)] 36.8 °C (98.2 °F)  Pulse:  [] 105  Resp:  [18-19] 19  BP: ()/(57-80) 117/80  SpO2:  [92 %-95 %] 92 %    Physical Exam  Constitutional:       Appearance: He is obese. He is not ill-appearing.   HENT:      Head: Normocephalic and atraumatic.   Eyes:      General: No scleral icterus.  Abdominal:      General: There is no distension.      Palpations: There is no mass.      Tenderness: There is abdominal tenderness. There is no guarding.      Comments: Colostomy and a drain in place   Musculoskeletal:         General: No swelling or deformity.      Right lower leg: No edema.      Left lower leg: No edema.   Skin:     General: Skin is warm.      Coloration: Skin is not jaundiced or pale.      Findings: No bruising.   Neurological:      General: No focal deficit present.      Mental Status: He is alert and oriented to person, place, and time. Mental status is at baseline.      Cranial Nerves: No cranial nerve deficit.      Motor: No weakness.         Fluids    Intake/Output Summary (Last 24 hours) at 12/2/2020 1729  Last data filed at 12/2/2020 0700  Gross per 24 hour   Intake 520 ml   Output 1160 ml   Net -640 ml       Laboratory  Recent Labs     12/02/20  0702   WBC 9.6   RBC 3.38*   HEMOGLOBIN 8.7*   HEMATOCRIT 29.0*   MCV 85.8   MCH 25.7*   MCHC 30.0*   RDW 57.1*   PLATELETCT 472*   MPV 10.2     Recent Labs     11/30/20  0525 12/02/20  0702   SODIUM 130* 135   POTASSIUM 4.3 4.0   CHLORIDE 92* 94*   CO2 34* 33   GLUCOSE 132* 141*   BUN 21 16   CREATININE 0.97 0.95   CALCIUM 8.7 8.9                   Imaging  IR-MIDLINE CATHETER INSERTION WO GUIDANCE > AGE 3   Final Result                  Ultrasound-guided midline placement performed by qualified nursing staff    as above.          CT-DRAIN-PERITONEAL   Final Result      Successful splenic abscess drainage tube placement.      Plan: Twice daily flushes  with 10 mL of sterile saline. Monitor outputs.         CT-ABDOMEN-PELVIS WITH   Final Result      1.  Intraparenchymal collection in the anterior aspect of the spleen has increased in size from the prior exam.      2.  Small amount of free fluid in the pelvis has increased from the prior exam.      3.  Small bilateral pleural effusions layer posteriorly, left greater than right, slightly decreased from the prior exam. Adjacent airspace disease is likely atelectasis.      4.  Cholelithiasis.      5.  Right renal scarring.      [id number] VIEW   Final Result      1.  No evidence of bowel obstruction.   2.  Postoperative changes as described.      CT-ABDOMEN-PELVIS W/O   Final Result      Postsurgical changes with a left abdominal stoma. Parastomal densities may be related to postsurgical hematoma measuring up to 2.8 x 2.1 cm.      Small amount of free fluid in the abdomen and pelvis.      Collection along the anterior spleen measures approximately 2.9 x 4.3 cm is decreased in size compared to the prior examination. Evaluation is limited by lack of intravenous contrast.      Third spacing.      Trace free intraperitoneal air is likely related to recent surgery.      Moderate left and small right pleural effusions with overlying atelectasis/consolidation.      Cortical scarring of the right kidney.      Cholelithiasis.         DX-CHEST-PORTABLE (1 VIEW)   Final Result      1.  Bilateral airspace and interstitial infiltrates.      2.  Cardiomegaly.      US-RENAL   Final Result    Examination was limited due to poor sound penetration related to body habitus.   Grossly normal appearance of the kidneys.      EC-ECHOCARDIOGRAM COMPLETE W/ CONT   Final Result      DX-CHEST-PORTABLE (1 VIEW)   Final Result      No pneumothorax identified following right subclavian line placement      Worsening atelectasis with consolidation at the bases not excluded      Stable cardiac silhouette enlargement      CT-ABDOMEN-PELVIS WITH    Final Result      Interval decrease in size of perisplenic abscess. Pigtail catheter now in place      Abscess has likely fistulous communication with the abutting splenic flexure of the colon. Colon is otherwise normal in appearance      Mild hepatosplenomegaly      Decreasing small left pleural effusion      Subacute anterior wedge compression fractures in the caudal thoracic spine      CT-DRAIN-PERITONEAL   Final Result      1.  CT guided perisplenic abscess catheter drainage.   2.  The current plan is to obtain a follow-up CT in 5-7 days..      OUTSIDE IMAGES-CT CHEST   Final Result      OUTSIDE IMAGES-CT ABDOMEN /PELVIS   Final Result      CT-ABDOMEN-PELVIS WITH   Final Result   Addendum 1 of 1   Addendum:   A prior noncontrast CT of the abdomen and pelvis from [facility] dated 10/19/2020 was made available for comparison. On the    prior study a perisplenic/subcapsular fluid collection was seen however    did not contain air at that time and    there was no discrete evidence of communication with the transverse colon.    Communication with splenic flexure of the colon and air within the    collection are new from the prior study.      Final      DX-CHEST-PORTABLE (1 VIEW)   Final Result      Slight interval interval improvement in congestive heart failure.           Assessment/Plan  * Spontaneous perforation of colon (HCC)- (present on admission)  Assessment & Plan  Admitted with LUQ abscess and perforation at splenic flexure   Segmental colectomy, colostomy, Patti pouch, I & D of abdominal wall abscess  Wound cultures: enterococcus and candida  And blood culture showed strep  Patient was evaluated and followed by ID  To continue ceftriaxone, fluconazole and vancomycin for 10 days after last surgery, last dose will be around 12/5  We might need to repeat images before discontinue antibiotics to make sure resolving on his abscess    Sepsis (HCC)  Assessment & Plan  Secondary to  perforation of colon,  Wound cultures: enterococcus & candida.  Started on Linezolid and Fluconazole. But his QTc is prolonged.  - DCed linezolide and started on vancomycin.  - continue Fluconazole  - DC Zyvox due to prolonged QTc  - continue  vancomycin (11/17)  - continue ceftriaxone 11/17and fluconazole  - Dced Flagyl on 11/21 due to nausea - nausea improved      Leucocytosis  Assessment & Plan  Due to intra-abdominal infection and abscess   continue antibiotics  Labs daily  Improving      Chronic heart failure ([facility])  Assessment & Plan  History of ischemic cardiomyopathy with ejection fraction 30/40%, no acute decompensation during this admission  With right ventricle dysfunction  Continue metoprolol and lisinopril and Aldactone  Continue amiodarone for atrial fibrillation  Lasix as needed  Weight daily    Paroxysmal A-fib with RVR ([facility])- (present on admission)  Assessment & Plan  Seen by cardiac electrophysiologist in 2019 and deemed not to be good candidate for anticoagulation due to frequent falls 2/2 orthostatic hypotension  CHADSVASC of 3   He was given digoxin however discontinued due to toxicity  Heart rate is controlled  Continue metoprolol XL 75 mg twice daily and amiodarone 20 mg daily  Continue holding anticoagulation at this time for possible surgery  To follow-up with cardiologist as outpatient    Prolonged QT interval  Assessment & Plan  Patient's QTc on admission 477 increased to 535 with Linezolid, zofran, zoloft, olanzapine.  DCed Linezolid, zofran, olanzapine & zoloft  QTc improved from 535 to 425 to 473  Seen by psychiatry for medication recommendations started on resperidone and modafinil.  - avoid QT prolonging medication.    Hypothyroidism  Assessment & Plan  Obese male   TSH 16.0 and T4 1.14  start on Levothyroxine 112mcg daily.  Repeat thyroid function after 6 to 8 weeks    Schizophrenia ([facility])  Assessment & Plan  History of schizophrenia on olanzapine and sertraline.  Developed  hallucination during this hospitalization, also had delayed prolonged QTC  Evaluated by psychiatrist and started with risperidone and modafinil, discontinue Zoloft and olanzapine  Stable at this time continue monitoring and follow-up with psychiatrist.  Patient reported feeling suicidal on 11/30/2020, patient placed on legal hold and psych consult placed    Elevated digoxin level  Assessment & Plan  H/o N/v and visual hallucinations including yellow halos, circles and squares  AFTER STARTING digoxin and 11/14 digoxin level 1.5 & on 11/17 Repeat digoxin level > 5,   & 0.8  Resolved  Keep holding digoxin    SRUTHI (acute kidney injury) (HCC)  Assessment & Plan  Resolved    Type 2 diabetes mellitus, without long-term current use of insulin, with peripheral neuropathy (HCC)- (present on admission)  Assessment & Plan  Patient states blood sugar well controlled at home with metformin.   Last a1c of 7.8 on 11/10/20   - continue Lantus 10U daily  - Sliding scale insulin with hypoglycemia protocol  - Ensure strict glucose control          VTE prophylaxis: lovenox

## 2020-12-03 NOTE — PROGRESS NOTES
Bedside report received.  Assessment completed.  A&O x 4.   On 0L O2. Denies SOB.  Denies N/V and new numbness or tingling.  Patient ambulates with two assist. Bed alarm n/a.   Pain managed with prescribed medications.  Tolerating low fiber/ GI soft diet.  + void via frye, + flatus, last BM 12/2 via frye.  Surgical incisions: LLQ ostomy, midline incision with wound vac    Reviewed plan with of care with patient. Call light and personal belongings with in reach. Hourly rounding in place. All needs met at this time.

## 2020-12-03 NOTE — DISCHARGE PLANNING
Anticipated Discharge Disposition: Inpatient Psych Hospital VS LTAC    Action: JAMES spoke with Ivonne Roberson via phone [phone]. Per Karol, she has been searching for a single story group home where eventually this patient will discharge to after [facility].      Per Karol, [facility] has agreed to accept this patient when he is discharged if there is a room available. Per Karol, if the previously mentioned group home doesn't have a room by the time this patient is appropriate to return to the community, she will need 1 week prior notice to find a group home.    Barriers to Discharge: Placement.    Plan: As Above. Inpatient Psych VS [facility], pending Psych Consult.

## 2020-12-04 ENCOUNTER — APPOINTMENT (OUTPATIENT)
Dept: RADIOLOGY | Facility: MEDICAL CENTER | Age: 55
DRG: 329 | End: 2020-12-04
Attending: STUDENT IN AN ORGANIZED HEALTH CARE EDUCATION/TRAINING PROGRAM
Payer: MEDICAID

## 2020-12-04 LAB
ANION GAP SERPL CALC-SCNC: 8 MMOL/L (ref 7–16)
BUN SERPL-MCNC: 15 MG/DL (ref 8–22)
CALCIUM SERPL-MCNC: 8.8 MG/DL (ref 8.5–10.5)
CHLORIDE SERPL-SCNC: 95 MMOL/L (ref 96–112)
CO2 SERPL-SCNC: 34 MMOL/L (ref 20–33)
CREAT SERPL-MCNC: 0.88 MG/DL (ref 0.5–1.4)
ERYTHROCYTE [DISTWIDTH] IN BLOOD BY AUTOMATED COUNT: 56.2 FL (ref 35.9–50)
GLUCOSE BLD-MCNC: 109 MG/DL (ref 65–99)
GLUCOSE BLD-MCNC: 118 MG/DL (ref 65–99)
GLUCOSE BLD-MCNC: 138 MG/DL (ref 65–99)
GLUCOSE BLD-MCNC: 145 MG/DL (ref 65–99)
GLUCOSE BLD-MCNC: 186 MG/DL (ref 65–99)
GLUCOSE SERPL-MCNC: 129 MG/DL (ref 65–99)
HCT VFR BLD AUTO: 30.3 % (ref 42–52)
HGB BLD-MCNC: 8.9 G/DL (ref 14–18)
MAGNESIUM SERPL-MCNC: 1.6 MG/DL (ref 1.5–2.5)
MCH RBC QN AUTO: 24.8 PG (ref 27–33)
MCHC RBC AUTO-ENTMCNC: 29.4 G/DL (ref 33.7–35.3)
MCV RBC AUTO: 84.4 FL (ref 81.4–97.8)
PHOSPHATE SERPL-MCNC: 4.1 MG/DL (ref 2.5–4.5)
PLATELET # BLD AUTO: 577 K/UL (ref 164–446)
PMV BLD AUTO: 9.3 FL (ref 9–12.9)
POTASSIUM SERPL-SCNC: 4.3 MMOL/L (ref 3.6–5.5)
RBC # BLD AUTO: 3.59 M/UL (ref 4.7–6.1)
SODIUM SERPL-SCNC: 137 MMOL/L (ref 135–145)
WBC # BLD AUTO: 9.8 K/UL (ref 4.8–10.8)

## 2020-12-04 PROCEDURE — 770001 HCHG ROOM/CARE - MED/SURG/GYN PRIV*

## 2020-12-04 PROCEDURE — 700102 HCHG RX REV CODE 250 W/ 637 OVERRIDE(OP): Performed by: STUDENT IN AN ORGANIZED HEALTH CARE EDUCATION/TRAINING PROGRAM

## 2020-12-04 PROCEDURE — 36415 COLL VENOUS BLD VENIPUNCTURE: CPT

## 2020-12-04 PROCEDURE — 84100 ASSAY OF PHOSPHORUS: CPT

## 2020-12-04 PROCEDURE — 700117 HCHG RX CONTRAST REV CODE 255: Performed by: STUDENT IN AN ORGANIZED HEALTH CARE EDUCATION/TRAINING PROGRAM

## 2020-12-04 PROCEDURE — A9270 NON-COVERED ITEM OR SERVICE: HCPCS | Performed by: STUDENT IN AN ORGANIZED HEALTH CARE EDUCATION/TRAINING PROGRAM

## 2020-12-04 PROCEDURE — 83735 ASSAY OF MAGNESIUM: CPT

## 2020-12-04 PROCEDURE — 700111 HCHG RX REV CODE 636 W/ 250 OVERRIDE (IP): Performed by: INTERNAL MEDICINE

## 2020-12-04 PROCEDURE — 700105 HCHG RX REV CODE 258: Performed by: INTERNAL MEDICINE

## 2020-12-04 PROCEDURE — A9270 NON-COVERED ITEM OR SERVICE: HCPCS | Performed by: INTERNAL MEDICINE

## 2020-12-04 PROCEDURE — 85027 COMPLETE CBC AUTOMATED: CPT

## 2020-12-04 PROCEDURE — 99232 SBSQ HOSP IP/OBS MODERATE 35: CPT | Performed by: STUDENT IN AN ORGANIZED HEALTH CARE EDUCATION/TRAINING PROGRAM

## 2020-12-04 PROCEDURE — 700102 HCHG RX REV CODE 250 W/ 637 OVERRIDE(OP): Performed by: INTERNAL MEDICINE

## 2020-12-04 PROCEDURE — 82962 GLUCOSE BLOOD TEST: CPT | Mod: 91

## 2020-12-04 PROCEDURE — 97606 NEG PRS WND THER DME>50 SQCM: CPT

## 2020-12-04 PROCEDURE — 700111 HCHG RX REV CODE 636 W/ 250 OVERRIDE (IP): Performed by: STUDENT IN AN ORGANIZED HEALTH CARE EDUCATION/TRAINING PROGRAM

## 2020-12-04 PROCEDURE — A9270 NON-COVERED ITEM OR SERVICE: HCPCS | Performed by: PSYCHIATRY & NEUROLOGY

## 2020-12-04 PROCEDURE — 700102 HCHG RX REV CODE 250 W/ 637 OVERRIDE(OP): Performed by: PSYCHIATRY & NEUROLOGY

## 2020-12-04 PROCEDURE — 80048 BASIC METABOLIC PNL TOTAL CA: CPT

## 2020-12-04 PROCEDURE — 99232 SBSQ HOSP IP/OBS MODERATE 35: CPT | Performed by: INTERNAL MEDICINE

## 2020-12-04 PROCEDURE — 74177 CT ABD & PELVIS W/CONTRAST: CPT

## 2020-12-04 PROCEDURE — 700105 HCHG RX REV CODE 258: Performed by: STUDENT IN AN ORGANIZED HEALTH CARE EDUCATION/TRAINING PROGRAM

## 2020-12-04 RX ADMIN — LISINOPRIL 20 MG: 20 TABLET ORAL at 05:15

## 2020-12-04 RX ADMIN — Medication 400 MG: at 17:46

## 2020-12-04 RX ADMIN — AMIODARONE HYDROCHLORIDE 200 MG: 200 TABLET ORAL at 05:14

## 2020-12-04 RX ADMIN — KETOCONAZOLE: 20 CREAM TOPICAL at 05:15

## 2020-12-04 RX ADMIN — VANCOMYCIN HYDROCHLORIDE 1000 MG: 500 INJECTION, POWDER, LYOPHILIZED, FOR SOLUTION INTRAVENOUS at 14:41

## 2020-12-04 RX ADMIN — INSULIN GLARGINE 15 UNITS: 100 INJECTION, SOLUTION SUBCUTANEOUS at 05:34

## 2020-12-04 RX ADMIN — IOHEXOL 60 ML: 350 INJECTION, SOLUTION INTRAVENOUS at 19:48

## 2020-12-04 RX ADMIN — FUROSEMIDE 20 MG: 10 INJECTION, SOLUTION INTRAMUSCULAR; INTRAVENOUS at 05:14

## 2020-12-04 RX ADMIN — CEFTRIAXONE SODIUM 2 G: 2 INJECTION, POWDER, FOR SOLUTION INTRAMUSCULAR; INTRAVENOUS at 05:13

## 2020-12-04 RX ADMIN — INSULIN HUMAN 3 UNITS: 100 INJECTION, SOLUTION PARENTERAL at 23:20

## 2020-12-04 RX ADMIN — TRAZODONE HYDROCHLORIDE 50 MG: 50 TABLET ORAL at 21:11

## 2020-12-04 RX ADMIN — RISPERIDONE 1 MG: 1 TABLET ORAL at 17:46

## 2020-12-04 RX ADMIN — METOPROLOL SUCCINATE 75 MG: 50 TABLET, EXTENDED RELEASE ORAL at 17:46

## 2020-12-04 RX ADMIN — MODAFINIL 100 MG: 100 TABLET ORAL at 05:14

## 2020-12-04 RX ADMIN — INSULIN HUMAN 3 UNITS: 100 INJECTION, SOLUTION PARENTERAL at 12:36

## 2020-12-04 RX ADMIN — HYDROCODONE BITARTRATE AND ACETAMINOPHEN 2 TABLET: 5; 325 TABLET ORAL at 14:29

## 2020-12-04 RX ADMIN — RISPERIDONE 0.5 MG: 1 TABLET ORAL at 05:15

## 2020-12-04 RX ADMIN — CETIRIZINE HYDROCHLORIDE 10 MG: 10 TABLET, FILM COATED ORAL at 05:14

## 2020-12-04 RX ADMIN — SPIRONOLACTONE 25 MG: 50 TABLET ORAL at 05:15

## 2020-12-04 RX ADMIN — FAMOTIDINE 20 MG: 20 TABLET, FILM COATED ORAL at 05:14

## 2020-12-04 RX ADMIN — LEVOTHYROXINE SODIUM 112 MCG: 112 TABLET ORAL at 05:15

## 2020-12-04 RX ADMIN — METOPROLOL SUCCINATE 75 MG: 50 TABLET, EXTENDED RELEASE ORAL at 05:14

## 2020-12-04 RX ADMIN — FLUCONAZOLE 400 MG: 200 TABLET ORAL at 05:14

## 2020-12-04 RX ADMIN — HYDROCODONE BITARTRATE AND ACETAMINOPHEN 1 TABLET: 5; 325 TABLET ORAL at 03:03

## 2020-12-04 RX ADMIN — Medication 400 MG: at 05:14

## 2020-12-04 ASSESSMENT — ENCOUNTER SYMPTOMS
SHORTNESS OF BREATH: 0
MUSCULOSKELETAL NEGATIVE: 1
DIARRHEA: 0
CONSTITUTIONAL NEGATIVE: 1
ABDOMINAL PAIN: 0
NAUSEA: 0
CARDIOVASCULAR NEGATIVE: 1
COUGH: 0
CHILLS: 0
MYALGIAS: 1
VOMITING: 0
NEUROLOGICAL NEGATIVE: 1
PSYCHIATRIC NEGATIVE: 1
RESPIRATORY NEGATIVE: 1
HEARTBURN: 0
FEVER: 0
ABDOMINAL PAIN: 1

## 2020-12-04 ASSESSMENT — PAIN DESCRIPTION - PAIN TYPE
TYPE: ACUTE PAIN
TYPE: ACUTE PAIN

## 2020-12-04 NOTE — PROGRESS NOTES
Hospital Medicine Daily Progress Note    Date of Service  12/3/2020    Chief Complaint  55 y.o. male admitted 11/3/2020 with abdominal pain    Hospital Course    55-year-old male with history of schizophrenia, diabetes, heart failure with ejection fraction 30% and atrial fibrillation presented 11/3 with left upper quadrant pain, initially the patient had perisplenic hematoma after fall however he developed fever and severe pain, CT scan showed perisplenic abscess with possible fistula and perforation, patient surgery for colostomy and for bowel perforation, blood culture on 11/31 showed Streptococcus sepsis,  A new drain was placed on 11/25, drain fluid culture from the abscess showed Streptococcus and yeast Candida, patient was evaluated by infectious disease and he was treated with ceftriaxone, fluconazole and later added vancomycin to treat Enterococcus since patient is PCN allergy stop date will be 12/5/2020..     Interval Problem Update  -Seen and examined the patient at bedside, had wound vac and colostomy care by Wound team   -Psych eval done rec legal hold and transfer to psych inpatient, will get re eval by Psych since patient has continued medical needs and will likely need LTAC or SNF.  -The patient has a chronic dyspnea on exertion, he is on room air.  -Labs are reviewed, improving on leukocytosis and kidney function.  -The patient is ready for discharge/placement -Try to insert midline and remove the central line    Consultants/Specialty  General surgeon  Infectious disease    Code Status  Full Code    Disposition  LTAC or SNF  Medically cleared for placement.      Review of Systems  Review of Systems   Constitutional: Negative.    HENT: Negative.    Respiratory: Negative.    Cardiovascular: Negative.    Gastrointestinal: Negative for abdominal pain, diarrhea, heartburn, nausea and vomiting.   Genitourinary: Negative.    Musculoskeletal: Negative.    Skin: Negative.    Neurological: Negative.     Psychiatric/Behavioral: Negative.         Physical Exam  Temp:  [36.2 °C (97.2 °F)-36.9 °C (98.4 °F)] 36.2 °C (97.2 °F)  Pulse:  [] 106  Resp:  [16-18] 18  BP: (109-124)/(72-78) 112/73  SpO2:  [90 %-93 %] 93 %    Physical Exam  Constitutional:       Appearance: He is obese. He is not ill-appearing.   HENT:      Head: Normocephalic and atraumatic.   Eyes:      General: No scleral icterus.  Abdominal:      General: There is no distension.      Palpations: There is no mass.      Tenderness: There is abdominal tenderness. There is no guarding.      Comments: Colostomy and a drain in place  Wound vac in place   Musculoskeletal:         General: No swelling or deformity.      Right lower leg: No edema.      Left lower leg: No edema.   Skin:     General: Skin is warm.      Coloration: Skin is not jaundiced or pale.      Findings: No bruising.   Neurological:      General: No focal deficit present.      Mental Status: He is alert and oriented to person, place, and time. Mental status is at baseline.      Cranial Nerves: No cranial nerve deficit.      Motor: No weakness.         Fluids    Intake/Output Summary (Last 24 hours) at 12/3/2020 1803  Last data filed at 12/3/2020 1747  Gross per 24 hour   Intake 1200 ml   Output 4200 ml   Net -3000 ml       Laboratory  Recent Labs     12/02/20  0702 12/03/20  0551   WBC 9.6 10.1   RBC 3.38* 3.30*   HEMOGLOBIN 8.7* 8.4*   HEMATOCRIT 29.0* 28.2*   MCV 85.8 85.5   MCH 25.7* 25.5*   MCHC 30.0* 29.8*   RDW 57.1* 56.4*   PLATELETCT 472* 535*   MPV 10.2 9.9     Recent Labs     12/02/20  0702 12/03/20  0551   SODIUM 135 134*   POTASSIUM 4.0 4.2   CHLORIDE 94* 94*   CO2 33 33   GLUCOSE 141* 117*   BUN 16 16   CREATININE 0.95 1.01   CALCIUM 8.9 8.8                   Imaging  IR-MIDLINE CATHETER INSERTION WO GUIDANCE > AGE 3   Final Result                  Ultrasound-guided midline placement performed by qualified nursing staff    as above.          CT-DRAIN-PERITONEAL   Final Result       Successful splenic abscess drainage tube placement.      Plan: Twice daily flushes with 10 mL of sterile saline. Monitor outputs.         CT-ABDOMEN-PELVIS WITH   Final Result      1.  Intraparenchymal collection in the anterior aspect of the spleen has increased in size from the prior exam.      2.  Small amount of free fluid in the pelvis has increased from the prior exam.      3.  Small bilateral pleural effusions layer posteriorly, left greater than right, slightly decreased from the prior exam. Adjacent airspace disease is likely atelectasis.      4.  Cholelithiasis.      5.  Right renal scarring.      GA-[id number]-1 VIEW   Final Result      1.  No evidence of bowel obstruction.   2.  Postoperative changes as described.      CT-ABDOMEN-PELVIS W/O   Final Result      Postsurgical changes with a left abdominal stoma. Parastomal densities may be related to postsurgical hematoma measuring up to 2.8 x 2.1 cm.      Small amount of free fluid in the abdomen and pelvis.      Collection along the anterior spleen measures approximately 2.9 x 4.3 cm is decreased in size compared to the prior examination. Evaluation is limited by lack of intravenous contrast.      Third spacing.      Trace free intraperitoneal air is likely related to recent surgery.      Moderate left and small right pleural effusions with overlying atelectasis/consolidation.      Cortical scarring of the right kidney.      Cholelithiasis.         DX-CHEST-PORTABLE (1 VIEW)   Final Result      1.  Bilateral airspace and interstitial infiltrates.      2.  Cardiomegaly.      US-RENAL   Final Result    Examination was limited due to poor sound penetration related to body habitus.   Grossly normal appearance of the kidneys.      EC-ECHOCARDIOGRAM COMPLETE W/ CONT   Final Result      DX-CHEST-PORTABLE (1 VIEW)   Final Result      No pneumothorax identified following right subclavian line placement      Worsening atelectasis with consolidation at the bases  not excluded      Stable cardiac silhouette enlargement      CT-ABDOMEN-PELVIS WITH   Final Result      Interval decrease in size of perisplenic abscess. Pigtail catheter now in place      Abscess has likely fistulous communication with the abutting splenic flexure of the colon. Colon is otherwise normal in appearance      Mild hepatosplenomegaly      Decreasing small left pleural effusion      Subacute anterior wedge compression fractures in the caudal thoracic spine      CT-DRAIN-PERITONEAL   Final Result      1.  CT guided perisplenic abscess catheter drainage.   2.  The current plan is to obtain a follow-up CT in 5-7 days..      OUTSIDE IMAGES-CT CHEST   Final Result      OUTSIDE IMAGES-CT ABDOMEN /PELVIS   Final Result      CT-ABDOMEN-PELVIS WITH   Final Result   Addendum 1 of 1   Addendum:   A prior noncontrast CT of the abdomen and pelvis from [facility] dated 10/19/2020 was made available for comparison. On the    prior study a perisplenic/subcapsular fluid collection was seen however    did not contain air at that time and    there was no discrete evidence of communication with the transverse colon.    Communication with splenic flexure of the colon and air within the    collection are new from the prior study.      Final      DX-CHEST-PORTABLE (1 VIEW)   Final Result      Slight interval interval improvement in congestive heart failure.           Assessment/Plan  * Spontaneous perforation of colon (HCC)- (present on admission)  Assessment & Plan  Admitted with LUQ abscess and perforation at splenic flexure   Segmental colectomy, colostomy, Patti pouch, I & D of abdominal wall abscess  Wound cultures: enterococcus and candida  And blood culture showed strep  Patient was evaluated and followed by ID  To continue ceftriaxone, fluconazole and vancomycin for 10 days after last surgery, last dose will be around 12/5  We might need to repeat images before discontinue antibiotics to make  sure resolving on his abscess    Sepsis (HCC)  Assessment & Plan  Secondary to perforation of colon,  Wound cultures: enterococcus & candida.  Started on Linezolid and Fluconazole. But his QTc is prolonged.  - DCed linezolide and started on vancomycin.  - continue Fluconazole  - DC Zyvox due to prolonged QTc  - continue  vancomycin (11/17)  - continue ceftriaxone 11/17and fluconazole  - Dced Flagyl on 11/21 due to nausea - nausea improved      Leucocytosis  Assessment & Plan  Due to intra-abdominal infection and abscess   continue antibiotics  Labs daily  Improving      Chronic heart failure (HCC)  Assessment & Plan  History of ischemic cardiomyopathy with ejection fraction 30/40%, no acute decompensation during this admission  With right ventricle dysfunction  Continue metoprolol and lisinopril and Aldactone  Continue amiodarone for atrial fibrillation  Patient Bicarb trending up will decrease lasix to 20 mg once dailiy  Weight daily  I and O -1800 adequate diuresis    Paroxysmal A-fib with RVR ([facility])- (present on admission)  Assessment & Plan  Seen by cardiac electrophysiologist in 2019 and deemed not to be good candidate for anticoagulation due to frequent falls 2/2 orthostatic hypotension  CHADSVASC of 3   He was given digoxin however discontinued due to toxicity  Heart rate is controlled  Continue metoprolol XL 75 mg twice daily and amiodarone 20 mg daily  Continue holding anticoagulation at this time for possible surgery  To follow-up with cardiologist as outpatient    Prolonged QT interval  Assessment & Plan  Patient's QTc on admission 477 increased to 535 with Linezolid, zofran, zoloft, olanzapine.  DCed Linezolid, zofran, olanzapine & zoloft  QTc improved from 535 to 425 to 473  Seen by psychiatry for medication recommendations started on resperidone and modafinil.  - avoid QT prolonging medication.    Hypothyroidism  Assessment & Plan  Obese male   TSH 16.0 and T4 1.14  start on Levothyroxine 112mcg  daily.  Repeat thyroid function after 6 to 8 weeks    Schizophrenia ([facility])  Assessment & Plan  History of schizophrenia on olanzapine and sertraline.  Developed hallucination during this hospitalization, also had delayed prolonged QTC  Evaluated by psychiatrist and started with risperidone and modafinil, discontinue Zoloft and olanzapine  Stable at this time continue monitoring and follow-up with psychiatrist.  Patient reported feeling suicidal on 11/30/2020, patient placed on legal hold and psych consult placed    Elevated digoxin level  Assessment & Plan  H/o N/v and visual hallucinations including yellow halos, circles and squares  AFTER STARTING digoxin and 11/14 digoxin level 1.5 & on 11/17 Repeat digoxin level > 5,   & 0.8  Resolved  Keep holding digoxin    SRUTHI (acute kidney injury) ([facility])  Assessment & Plan  Resolved    Type 2 diabetes mellitus, without long-term current use of insulin, with peripheral neuropathy ([facility])- (present on admission)  Assessment & Plan  Patient states blood sugar well controlled at home with metformin.   Last a1c of 7.8 on 11/10/20   - continue Lantus 10U daily  - Sliding scale insulin with hypoglycemia protocol  - Ensure strict glucose control          VTE prophylaxis: lovenox

## 2020-12-04 NOTE — WOUND TEAM
[facility]  Inpatient Services  Wound and Skin Care Progress note    Admission Date: 11/3/2020     Last order of IP CONSULT TO WOUND CARE was found on 11/16/2020 from Hospital Encounter on 11/3/2020     HPI, PMH, SH: Reviewed    Unit where seen by Wound Team: [id number]     WOUND CONSULT/FOLLOW UP RELATED TO:   Scheduled abdominal NPWT dressing    Self Report / Pain Level:  Pre-medicated with PO Pain medication    OBJECTIVE:  Vac dressing intact  WOUND TYPE, LOCATION, CHARACTERISTICS (Pressure Injuries: location, stage, POA or date identified)     Negative Pressure Wound Therapy 11/10/20 Surgical Abdomen Midline (Active)   Vacuum Serial Number [id number]    NPWT Pump Mode / Pressure Setting Ulta;Continuous;125 mmHg    Dressing Type Medium;Black Foam (Regular)    Number of Foam Pieces Used 3    Canister Changed No        Wound 11/10/20 Full Thickness Wound Abdomen Midline wound vac (Active)   Wound Image   12/04/20 1527   Site Assessment Red;Fully granulated    Periwound Assessment Intact    Margins Attached edges    Closure Secondary intention    Drainage Amount Scant    Drainage Description Serosanguineous    Treatments Cleansed;Site care    Wound Cleansing Normal Saline Irrigation    Periwound Protectant Benzoin;Skin Protectant Wipes to Periwound    Dressing Cleansing/Solutions Not Applicable    Dressing Options Wound Vac    Dressing Changed Changed    Dressing Status Intact    Dressing Change/Treatment Frequency Monday, Wednesday, Friday, and As Needed    NEXT Dressing Change/Treatment Date 12/07/20    NEXT Weekly Photo (Inpatient Only) 12/11/20    Number of Staples Removed 10    Non-staged Wound Description Full thickness    Wound Length (cm) 15.5 cm    Wound Width (cm) 6 cm    Wound Depth (cm) 1.5 cm    Wound Surface Area (cm^2) 93 cm^2    Wound Volume (cm^3) 139.5 cm^3    Wound Healing % 82    Wound Bed Granulation (%) 80 %    Tunneling (cm) 2 cm    Tunneling Clock Position of Wound 12       Undermining (cm) 0 cm    Shape Oval x1, Round x1    Wound Odor None    Exposed Structures None       Lab Values:    Lab Results   Component Value Date/Time    WBC 9.8 12/04/2020 05:34 AM    RBC 3.59 (L) 12/04/2020 05:34 AM    HEMOGLOBIN 8.9 (L) 12/04/2020 05:34 AM    HEMATOCRIT 30.3 (L) 12/04/2020 05:34 AM    CREACTPROT 7.52 (H) 11/29/2020 01:25 AM    SEDRATEWES 13 10/23/2018 08:02 AM    HBA1C 7.8 (H) 11/10/2020 03:31 AM      Culture Results show:  Recent Results (from the past 720 hour(s))   CULTURE WOUND W/ GRAM STAIN    Collection Time: 11/25/20  4:52 PM    Specimen: Wound   Result Value Ref Range    Significant Indicator POS (POS)     Source WND     Site Joya-splenic drain     Culture Result (A)      Growth noted after further incubation, see below for  organism identification.      Gram Stain Result Moderate WBCs.  Rare Gram positive cocci.       Culture Result Enterococcus faecalis  Rare growth   (A)        Susceptibility    Enterococcus faecalis - ISAMAR     Daptomycin 4 Sensitive mcg/mL     Ampicillin <=2 Sensitive mcg/mL     Gent Synergy <=500 Sensitive mcg/mL     Vancomycin 1 Sensitive mcg/mL     Penicillin 2 Sensitive mcg/mL       INTERVENTIONS BY WOUND TEAM:   Chart and images reviewed. Discussed with pt and bedside RN. NPWT dressing removed, no retained foam. Wound cleansed with wound cleanser and gauze. Joya-wound prepped with no sting skin barrier and drape. Mepitel one strips applied over staples. One piece of half thickness spiraled black foam into 1cm of the depth of 12 O'clock tunnel and extended to distal wound. 1 pieces of half thickness cut to fit black foam applied over superior wound to fill remaining wound depth.  All exposed foam secured with drape. A hole was cut in drape over superior wound and trac pad applied. Suction resumed at 125mmhg continuous no leaks identified.  No change in vac setting see flowsheet for details.    Interdisciplinary consultation: Patient, Ammy PÉREZ      EVALUATION  / RATIONALE FOR TREATMENT:      Tunnel  at 12 O'clock decreased in size. Vj-skin significantly improved since last assessment. Did not require hydrocolloid or paste ring to vj-skin. Wound bed red with significantly more granular tissue with decreased adipose. Continue NPWT to encourage wound closure via secondary intention.    Goals: Steady decrease in wound area and depth weekly.    NURSING PLAN OF CARE ORDERS (X):    Dressing changes: See Dressing Care orders: X  Skin care: See Skin Care orders: X  RN Prevention Protocol: X  Rectal tube care: See Rectal Tube Care orders:   Other orders:      WOUND TEAM PLAN OF CARE:   Dressing changes by wound team:                   Follow up 3 times weekly:                NPWT change 3 times weekly:  X - abdomen   Follow up 1-2 times weekly:    X -left flank   Follow up Bi-Monthly:                   Follow up as needed:       Other (explain):     Anticipated discharge plans:  LTACH:        SNF/Rehab:     X            Home Health Care:           Outpatient Wound Center:            Self Care:

## 2020-12-04 NOTE — PROGRESS NOTES
Bedside report received.  Assessment completed.  A&O x 4.   On 0L O2. Denies SOB.  Denies N/V and new numbness or tingling.  Patient ambulates with two assist. Bed alarm n/a.   Pain managed with prescribed medications.  Tolerating low fiber/ GI soft diet.  + void via frye, + flatus, last BM 12/3 via frye.  Surgical incisions: LLQ ostomy, midline incision with wound vac     Reviewed plan with of care with patient. Call light and personal belongings with in reach. Hourly rounding in place. All needs met at this time.

## 2020-12-04 NOTE — PROGRESS NOTES
Patient is on a legal hold, 1:1 sitter at bedside. Patient is currently watching T.V.. I am covering for the C.N.A sitter while she went on breakfast break. I will continue to monitor.

## 2020-12-04 NOTE — PROGRESS NOTES
Infectious Disease Progress Note    Author: Cristy Holley M.D. Date & Time of service: 12/4/2020  11:29 AM    Chief Complaint:  Intrabdominal abscess and bacteremia     Interval History:   55 y.o. diabetic male admitted 11/3/2020.+ A. fib, CHD, and severe morbid obesity.  Seen by ID in the past for right foot abscess with cultures positive for MRSA and Proteus.  s/p GLF approximately 1 month ago and was admitted to [facility] with left upper quadrant pain.  CT at the time revealed a subcapsular splenic hematoma. Presented to ED complaining of abdominal pain.  11/9 AF WBC 9.4 Less pain in side but remains tender to touch. Denies SE Zyvox-counseled about sxs serotonin syndrome  11/11 T-max 99.7 WBC 15.4 patient underwent colectomy, colostomy placement and I&D of flank wound with wound VAC placement on 11/10/2020 by Dr. Desouza.  Postop course complicated by hypotension and transferred to the ICU.  On pressors.  Patient complaining of shortness of breath as well as lightheadedness.  Also has postop abdominal pain.  11/12 afebrile WBC 13.3.  Patient weaned off pressors overnight.  Operative cultures growing group D Enterococcus and yeast.  Patient feeling better today.  Improved shortness of breath.  Abdominal pain stable  11/13 afebrile WBC 13.8 patient continues to feel better.  He is inquiring when he can have food by mouth.  11/14 afebrile WBC 9.9.  Patient feeling a bit better today.  His diet has been slowly advanced.  He is however complaining of some sharp abdominal pain when he coughs  11/16  AF, O2 RA, abdominal pain and poor appetite.   11/17 AF, O2 1 L NC , WBC 19.1. Drain output 465 cc yesterday. Ongoing nausea, poor appetite and abdomen is quite tender on palpation.  Will broaden antibiotics.   11/18 AF, O2 1 L NC, WBC 18.1, Drain output- 610 cc. Patient still has abdominal pain and significant nausea.  Examined abdominal wound photos and discussed with wound care team.  He has a large wound but  clean appearing base with granulation tissue.  No obvious infection in the surrounding tissues.  Left side wound with packing in place, ongoing drainage but clearing .    AF, tachycardia and hypertensive , WBC 17.2, drain output-205 cc yesterday. C/o nausea and agree with plan to transition Flagyl to IV to see if this helps.     AF WBC 13.6 feels OK-abd pain controlled. Poor appetite and not eating much. Denies SE abx-nausea resolved   AF WBC 16 no new complaints-no new cough, dyspnea and abd pain the same. Appetite so-so.   AF WBC 13 CT reviewed H and H continues to decrease. Somnolent today-no acute events overnight   AF 11.8 had new drain placed-tolerated well-still poor appetite    AF WBC 11.4 cxs with enterococcus-tired today. No new complaints   AF WBC 9.6 c/o itching face and back. Pain controlled    AF PICC bandage itchy-VAC changed this am-pictures reviewed. No new complaints   AF suicidal ideation and seeing spirit dog noted-psych eval ordered   AF WBC 9.6 has sitter-feeling a little more positive. Rash on face flaking   AF WBC 9.8 rash on face resolving. Still has 1:1 sitter-awaiting placement. Wound care notes and pics reviewed    Review of Systems:  Review of Systems   Constitutional: Negative for chills and fever.   Respiratory: Negative for cough and shortness of breath.    Cardiovascular: Negative for chest pain.   Gastrointestinal: Positive for abdominal pain. Negative for diarrhea, nausea and vomiting.   Musculoskeletal: Positive for joint pain and myalgias.   Skin: Positive for rash. Negative for itching.   All other systems reviewed and are negative.      Hemodynamics:  Temp (24hrs), Av.4 °C (97.6 °F), Min:36.2 °C (97.2 °F), Max:36.6 °C (97.8 °F)  Temperature: 36.6 °C (97.8 °F)  Pulse  Av.7  Min: 51  Max: 161   Blood Pressure: (!) 97/58      Physical Exam:  Physical Exam  Vitals signs and nursing note reviewed.   Constitutional:        General: He is not in acute distress.     Appearance: He is obese. He is not ill-appearing, toxic-appearing or diaphoretic.   HENT:      Nose: No rhinorrhea.      Mouth/Throat:      Pharynx: No oropharyngeal exudate.   Eyes:      General: No scleral icterus.     Extraocular Movements: Extraocular movements intact.      Pupils: Pupils are equal, round, and reactive to light.   Neck:      Musculoskeletal: Neck supple. No muscular tenderness.   Cardiovascular:      Rate and Rhythm: Tachycardia present. Rhythm irregular.   Pulmonary:      Effort: Pulmonary effort is normal. No respiratory distress.      Breath sounds: No stridor. No wheezing.      Comments: Line right chest removed  Abdominal:      General: There is no distension.      Palpations: Abdomen is soft.      Tenderness: There is abdominal tenderness. There is no guarding or rebound.      Comments: Wound VAC in place, no sign infection in surrounding tissue-measures 11 X7 X 3 cm  Ostomy     Musculoskeletal:      Right lower leg: Edema present.      Left lower leg: Edema present.   Skin:     General: Skin is warm.      Coloration: Skin is not jaundiced.      Findings: Bruising and rash present.      Comments: Rash on face-not on chest or abd. Macularpapular, flaking   Unable to visualize back   Neurological:      General: No focal deficit present.      Mental Status: He is alert and oriented to person, place, and time.   Psychiatric:         Mood and Affect: Mood normal.         Meds:    Current Facility-Administered Medications:   •  HYDROcodone-acetaminophen  •  furosemide  •  insulin glargine  •  cefTRIAXone (ROCEPHIN) IV  •  fluconazole  •  vancomycin  •  ketoconazole  •  acetaminophen  •  diphenhydrAMINE  •  metoclopramide  •  risperiDONE  •  risperiDONE  •  magnesium oxide  •  modafinil  •  levothyroxine  •  prochlorperazine  •  metoprolol SR  •  lisinopril  •  amiodarone  •  traZODone  •  Metoprolol Tartrate  •  Respiratory Therapy Consult  •  MD  Alert...Vancomycin per Pharmacy  •  spironolactone  •  cetirizine  •  famotidine  •  insulin regular **AND** POC Blood Glucose **AND** NOTIFY MD and PharmD **AND** glucose **AND** dextrose 50%  •  albuterol    Labs:  Recent Labs     12/02/20  0702 12/03/20  0551 12/04/20  0534   WBC 9.6 10.1 9.8   RBC 3.38* 3.30* 3.59*   HEMOGLOBIN 8.7* 8.4* 8.9*   HEMATOCRIT 29.0* 28.2* 30.3*   MCV 85.8 85.5 84.4   MCH 25.7* 25.5* 24.8*   RDW 57.1* 56.4* 56.2*   PLATELETCT 472* 535* 577*   MPV 10.2 9.9 9.3   NEUTSPOLYS 63.10 54.10  --    LYMPHOCYTES 21.50* 28.40  --    MONOCYTES 7.30 8.30  --    EOSINOPHILS 7.10* 7.80*  --    BASOPHILS 0.70 0.80  --    RBCMORPHOLO  --  Present  --      Recent Labs     12/02/20  0702 12/03/20  0551 12/04/20  0534   SODIUM 135 134* 137   POTASSIUM 4.0 4.2 4.3   CHLORIDE 94* 94* 95*   CO2 33 33 34*   GLUCOSE 141* 117* 129*   BUN 16 16 15     Recent Labs     12/02/20  0702 12/03/20  0551 12/04/20  0534   CREATININE 0.95 1.01 0.88       Imaging:  Ct-abdomen-pelvis W/o    Result Date: 11/17/2020 11/17/2020 1:51 PM HISTORY/REASON FOR EXAM:  Nausea/vomiting; elevated WBC; R/O abscess. TECHNIQUE/EXAM DESCRIPTION: CT scan of the abdomen and pelvis without contrast. Noncontrast helical scanning was obtained from the diaphragmatic domes through the pubic symphysis. Low dose optimization technique was utilized for this CT exam including automated exposure control and adjustment of the mA and/or kV according to patient size. COMPARISON: 11/8/2020. FINDINGS: There is a moderate left and small right pleural effusion with overlying atelectasis/consolidation. Trace free air is seen in the abdomen. Evaluation of parenchymal and vascular structures is limited by the lack of intravenous contrast. The liver appears  unremarkable. There are gallstones within the gallbladder. There is hypodensity along the anterior aspect of the spleen measuring approximately 4.3 x 2.9 cm. No adrenal mass is identified. There is right  renal atrophy with cortical scarring. There is no  evidence of hydronephrosis. Pancreas appears unremarkable. Aorta is not aneurysmal. There are small inguinal, retroperitoneal and mesenteric lymph nodes. There is no evidence of bowel obstruction. Visualized appendix appears unremarkable. There is a left abdominal stoma. There is parastomal density measuring up to 2.8 x 2.1 cm likely representing a postsurgical hematoma. There is a small amount of free fluid in the abdomen and pelvis. Bladder is mildly distended. There is a surgical drain in the left abdomen. There is third spacing. Degenerative changes are seen in the spine. Compression deformities of T11 and T12 are again noted. Bones are demineralized.     Postsurgical changes with a left abdominal stoma. Parastomal densities may be related to postsurgical hematoma measuring up to 2.8 x 2.1 cm. Small amount of free fluid in the abdomen and pelvis. Collection along the anterior spleen measures approximately 2.9 x 4.3 cm is decreased in size compared to the prior examination. Evaluation is limited by lack of intravenous contrast. Third spacing. Trace free intraperitoneal air is likely related to recent surgery. Moderate left and small right pleural effusions with overlying atelectasis/consolidation. Cortical scarring of the right kidney. Cholelithiasis.     Ct-abdomen-pelvis With    Result Date: 11/9/2020 11/8/2020 11:34 PM HISTORY/REASON FOR EXAM:  Abdominal infection suspected; Has perisplenic abscess with possible connection to bowel TECHNIQUE/EXAM DESCRIPTION: CT of the abdomen and pelvis with contrast. Contrast-enhanced helical scanning was obtained from the diaphragmatic domes through the pubic symphysis following the bolus administration of 100 mL of nonionic contrast (Omnipaque 350) without complication. Low dose optimization technique was utilized for this CT exam including automated exposure control and adjustment of the mA and/or kV according to patient  size. COMPARISON: 11/3/2020 CT FINDINGS: The visualized lung bases show diminished small left pleural fluid that layers dependently. There is similar middle and left lower lobe atelectasis . Similar multichamber cardiac enlargement CT Abdomen: There is a new pigtail catheter in the anterior perisplenic fluid collection. The collection does diminish in size now measuring 58 x 33 from 79 x 40 mm. There is still gas within it and it is in direct contact with the splenic flexure of the colon. A small amount of gas is also seen in the more superficial soft tissues inferior to the pigtail catheter which crosses the ninth intercostal space No new abscess is detected Contrast is present in the small bowel but not yet in the transverse colon. No contrast extravasation is seen. No abnormal bowel dilatation The pancreas, adrenal glands and kidneys enhance normally. Mild hepatosplenomegaly Some low-density in the gallbladder likely indicating cholelithiasis There is no lymphadenopathy or aneurysmal change of the aorta. Compression deformities in the spine are again seen with subacute fractures resulting in anterior wedging at T11 and T12 There is diffuse fat stranding indicating anasarca CT Pelvis: Visualized pelvic structures are unremarkable. Prostate is within normal limits for age. No lymphadenopathy. There is no free air or free fluid.     Interval decrease in size of perisplenic abscess. Pigtail catheter now in place Abscess has likely fistulous communication with the abutting splenic flexure of the colon. Colon is otherwise normal in appearance Mild hepatosplenomegaly Decreasing small left pleural effusion Subacute anterior wedge compression fractures in the caudal thoracic spine    Ct-abdomen-pelvis With    Addendum Date: 11/5/2020    Addendum: A prior noncontrast CT of the abdomen and pelvis from [facility] dated 10/19/2020 was made available for comparison. On the prior study a  perisplenic/subcapsular fluid collection was seen however did not contain air at that time and there was no discrete evidence of communication with the transverse colon. Communication with splenic flexure of the colon and air within the collection are new from the prior study.    Result Date: 11/5/2020  11/3/2020 4:02 PM HISTORY/REASON FOR EXAM:  Abd pain, unspecified; IV contrast only. TECHNIQUE/EXAM DESCRIPTION:   CT scan of the abdomen and pelvis with contrast. Contrast-enhanced helical scanning was obtained from the diaphragmatic domes through the pubic symphysis following the bolus administration of nonionic contrast without complication. 100 mL of Omnipaque 350 nonionic contrast was administered without complication. Low dose optimization technique was utilized for this CT exam including automated exposure control and adjustment of the mA and/or kV according to patient size. COMPARISON: 9/26/2018 FINDINGS: Chest Base: Small left pleural effusion with mild bibasilar atelectasis. Heart is mildly enlarged. Liver:  Diffuse hypoattenuation of the liver suggesting fatty infiltration. Moderately enlarged. Gallbladder: Cholelithiasis. Biliary tract: Nondilated. Pancreas: Normal. Spleen: There is a rim-enhancing perisplenic fluid collection which contains air and which communicates with the splenic flexure of the colon. This most likely represents an abscess and measures about 8.7 x 7.9 cm. The anterior aspect of the spleen is nonenhancing with irregular margins could be related to infection of splenic parenchyma or infarction. Adrenals: Normal. Kidneys and Collecting Systems:  There is mild right renal cortical scarring. No hydronephrosis. No renal mass. Gastrointestinal tract:  No bowel obstruction. The appendix is normal. Peritoneum: No free air or free fluid. Reproductive organs:  Normal. Bladder:  Normal. Vessels:  Normal caliber. Lymph  Nodes:  No lymphadenopathy. Abdominal wall: Within normal limits. Bones:  Age  indeterminate possibly subacute to chronic T11 and T12 superior endplate compression fractures.     1.  Peripherally enhancing perisplenic fluid collection which communicates with the splenic flexure of the colon and which contains foci of air is most consistent with an abscess/infected fluid collection. There is nonenhancement of the anterior aspect of the spleen suggesting infection/infarction. Surgical consultation is recommended. 2.  Hepatomegaly and hepatic steatosis. 3.  Cholelithiasis. 4.  Right renal cortical scarring. No hydronephrosis. 5.  Small left pleural effusion. Mild bibasilar atelectasis. 6.  Subacute to chronic appearing T11 and T12 superior endplate compression fractures. These findings were discussed with SVETLANA PAREDES on 11/3/2020 4:49 PM.     Ct-drain-peritoneal    Result Date: 11/4/2020 11/4/2020 5:19 PM HISTORY/REASON FOR EXAM:  Joya Splenic Fluid Collection TECHNIQUE/EXAM DESCRIPTION: LUQ parasplenic abscess drainage with CT guidance. Low dose optimization technique was utilized for this CT exam including automated exposure control and adjustment of the mA and/or kV according to patient size. PROCEDURE: Informed consent was obtained. SEDATION: Moderate sedation was provided. Pulse oximetry and continuous cardiac monitoring by the nurse was performed throughout the exam. Intraservice time was 30 minutes. Localizing CT images were obtained with the patient in supine position. The skin was prepped with Betadine and draped in a sterile fashion. Following local anesthesia with 1% Lidocaine, a 17 G guiding needle was placed and needle path confirmed with CT. An Amplatz guidewire was placed and following serial tract dilatation, a 10 [place] pigtail locking catheter was placed. A specimen was collected and submitted for culture and sensitivity and Gram stain. Total of 6 mL of Brownish fluid was drained. The catheter was secured to the skin and connected to suction bulb drainage. Final CT  images were obtained documenting catheter position. The patient tolerated the procedure well with no evidence of complication. COMPARISON: CT scan abdomen and pelvis 11/3/2020 FINDINGS: The final CT images show satisfactory catheter position within the target collection.     1.  CT guided perisplenic abscess catheter drainage. 2.  The current plan is to obtain a follow-up CT in 5-7 days..    [id number] View    Result Date: 11/20/2020 11/20/2020 2:00 PM HISTORY/REASON FOR EXAM:  Abdominal Pain. TECHNIQUE/EXAM DESCRIPTION AND NUMBER OF VIEWS:  1 view(s) of the abdomen. COMPARISON: None FINDINGS: No evidence for small bowel obstruction. Surgical clips present in the mid abdomen. LEFT mid abdominal stoma noted. Surgical drain present in the LEFT lateral abdomen. Catheter projects of the RIGHT midabdomen, possibly wound VAC. Degenerative change of lumbar spine.     1.  No evidence of bowel obstruction. 2.  Postoperative changes as described.    Dx-chest-portable (1 View)    Result Date: 11/17/2020 11/17/2020 9:19 AM HISTORY/REASON FOR EXAM: Elevated white blood cell count. TECHNIQUE/EXAM DESCRIPTION AND NUMBER OF VIEWS: Single portable view of the chest. COMPARISON: 11/10/2020 FINDINGS: There are bilateral interstitial and airspace infiltrates. There is small bilateral pleural effusion. The heart is enlarged. Right subclavian central line is again seen.     1.  Bilateral airspace and interstitial infiltrates. 2.  Cardiomegaly.    Dx-chest-portable (1 View)    Result Date: 11/10/2020  11/10/2020 12:29 PM HISTORY/REASON FOR EXAM: new central line. TECHNIQUE/EXAM DESCRIPTION AND NUMBER OF VIEWS: Single AP view of the chest. COMPARISON: November 3 FINDINGS: Hardware: ] Tip overlies the cavoatrial junction Lungs: Linear middle lobe and left basilar opacity is seen. Volumes are low Pleura:  No pneumothorax is identified Heart and mediastinum: There is stable cardiac silhouette enlargement.     No pneumothorax identified  following right subclavian line placement Worsening atelectasis with consolidation at the bases not excluded Stable cardiac silhouette enlargement    Dx-chest-portable (1 View)    Result Date: 11/3/2020  11/3/2020 3:41 PM HISTORY/REASON FOR EXAM:  Chest Pain. TECHNIQUE/EXAM DESCRIPTION AND NUMBER OF VIEWS: Single portable view of the chest. COMPARISON: September 10, 2020 FINDINGS: The cardiac size is enlarged. There is interstitial prominence. Lung aeration has improved. No pneumothorax. No obvious pleural effusion.     Slight interval interval improvement in congestive heart failure.    Us-renal    Result Date: 11/11/2020 11/11/2020 6:21 PM HISTORY/REASON FOR EXAM:  Abnormal Labs TECHNIQUE/EXAM DESCRIPTION: Renal ultrasound. COMPARISON:  None FINDINGS: The right kidney measures 9.39 cm. The left kidney measures 9.54 cm. There is no hydronephrosis. There are no abnormal calcifications. The bladder not fully the time of imaging.      Examination was limited due to poor sound penetration related to body habitus. Grossly normal appearance of the kidneys.    Ec-echocardiogram Complete W/ Cont    Result Date: 11/11/2020  Transthoracic Echo Report Echocardiography Laboratory CONCLUSIONS Normal left ventricular chamber size. Moderately reduced left ventricular systolic function. Left ventricular ejection fraction is visually estimated to be 30-40%; variable related to atrial fibrillation. Mild mitral regurgitation. Moderately dilated right ventricle. Reduced right ventricular systolic function. Right heart pressures are normal. No prior study is available for comparison. LV EF:        % FINDINGS Left Ventricle 3 mL of contrast was administered. Contrast was used to enhance visualization of the endocardial border. Existing IV was used. Mild concentric left ventricular hypertrophy. Normal left ventricular chamber size.  Moderately reduced left ventricular systolic function. Left ventricular ejection fraction is visually  estimated to be 30-40%. Diastolic function is difficult to assess with atrial fibrillation. Right Ventricle Moderately dilated right ventricle. Reduced right ventricular systolic function. Right Atrium Enlarged right atrium. Normal inferior vena cava size and inspiratory collapse. Left Atrium Normal left atrial size. Mitral Valve Structurally normal mitral valve. Mild mitral regurgitation. No mitral stenosis. Aortic Valve Tricuspid aortic valve. Structurally normal aortic valve. No stenosis or regurgitation seen. Tricuspid Valve Structurally normal tricuspid valve. Mild tricuspid regurgitation. Right atrial pressure is estimated to be 3 mmHg. Estimated right ventricular systolic pressure  is 25 mmHg. No tricuspid stenosis. Right heart pressures are normal. Pulmonic Valve Structurally normal pulmonic valve without significant stenosis or regurgitation. Pericardium Normal pericardium without effusion. Aorta The aortic root is normal.  Ascending aorta diameter is 3.6 cm. Germain Pacheco M.D. (Electronically Signed) Final Date:     11 November 2020                 12:38 Amended:        11 November 2020 12:40    CT scan 11/24  CT Abdomen:  The liver, adrenal glands, and pancreas are unremarkable.     There are multiple nitrogen-containing calculi in the gallbladder.     Hypodense collection in the anterior spleen has increased in size to approximately 5.8 x 4.6 cm, previously 4.2 x 2.9 cm. There is stranding in the adjacent fat.     The kidneys enhance symmetrically. There is right renal scarring.     Left abdominal colostomy is again noted with small adjacent hematomas in the subcutaneous fat. There is residual contrast in the distal colon. No contrast extravasation or pneumoperitoneum is appreciated.     There are calcifications in the arteries.     Micro:  Results     Procedure Component Value Units Date/Time    CULTURE WOUND W/ GRAM STAIN [[id number]]  (Abnormal)  (Susceptibility) Collected: 11/25/20 9221     Order Status: Completed Specimen: Wound Updated: 11/28/20 [id number]     Significant Indicator POS     Source WND     Site Joya-splenic drain     Culture Result Growth noted after further incubation, see below for  organism identification.       Gram Stain Result Moderate WBCs.  Rare Gram positive cocci.       Culture Result Enterococcus faecalis  Rare growth      Narrative:      Collected By:[id number] ERLIN SPARROW  Joya-splenic collection.  Collected By:[id number] ERLIN SPARROW    Susceptibility     Enterococcus faecalis (1)     Antibiotic Interpretation Microscan Method Status    Daptomycin Sensitive 4 mcg/mL ISAMAR Final    Ampicillin Sensitive <=2 mcg/mL ISAMAR Final    Gent Synergy Sensitive <=500 mcg/mL ISAMAR Final    Vancomycin Sensitive 1 mcg/mL ISAMAR Final    Penicillin Sensitive 2 mcg/mL ISAMAR Final                         Assessment:  Active Hospital Problems    Diagnosis   • *Spontaneous perforation of colon ([facility]) [K63.1]   • Leucocytosis [D72.829]   • Schizophrenia ([facility]) [F20.9]   • Sepsis ([facility]) [A41.9]   • Elevated digoxin level [R78.89]   • SRUTHI (acute kidney injury) ([facility]) [N17.9]   • Adrenal insufficiency ([facility]) [E27.40]   • LV dysfunction [I51.9]   • Paroxysmal A-fib with RVR ([facility]) [I48.0]   • Type 2 diabetes mellitus, without long-term current use of insulin, with peripheral neuropathy ([facility]) [E11.9]   • Hypothyroidism [E03.9]   • Prolonged QT interval [R94.31]     Hospital Course:   CT scan on 11/3 revealed a peripherally enhancing perisplenic fluid collection measuring 8.7 x 7.9 cm, communicating with the splenic flexure of the colon and contained foci of air consistent with abscess/infected fluid.  Also noted nonenhancing of the anterior aspect of the spleen suggesting infection/infarction.    Status post surgery on 11/10/2020    Leukocytosis -Decreased     Acute kidney injury- improved   Renal ultrasound-unremarkable  Nephrology following  Avoid nephrotoxins       Perisplenic abscess with possible communication  to the bowel  Left upper quadrant abscess  s/p ground-level fall complicated by a subcapsular splenic hematoma  Afebrile  Splenic enhancement on CT, suggesting infection or infarction  -s/p drainage 11/4 and peritoneal fluid Efaecalis, ampicillin sensitive and C albicans  S/p segmental colectomy, colostomy, irrigation and debridement of flank wound and wound VAC placement on 11/10/2020 by Dr. Desouza.  Patient found to have a left upper quadrant abscess inferior to the spleen with a large bowel wall defect.  Also noted to have an abdominal wall abscess.  Op cultures (colon perforation) - E faecalis and Candida albicans.    -CT abdomen pelvis without on 11/17-hypodensity along anterior aspect of the spleen measuring 4.3 x 2.9 cm.  Parastomal density measuring two-point by 2.1 cm likely representing postsurgical hematoma.  This is decreased in size from prior.  Small amount of free fluid in the abdomen pelvis.  -Drain removed on 11/21  -CT 11/24 with enlarging anterior splenic fluid collection  Deemed nonsurical-  -11/25 new drain placed by IR. Culture + Efaecalis  -Wound granulating     Continue vancomycin to treat the Enterococcus (PCN allergic) for now-(prior Efaecalis)-monitor renal function. Stable thus far  Continue ceftriaxone to provide gram-negative coverage.    Continue fluconazole 400 mg daily  for the Candida  Anticipate 10 day course from date of last surgery/source control  Tentative stop date 12/5/2020-recommend repeat CT scan prior to stopping antibiotics     Rash, improved   Continue Nizoral to face daily for 10 days  Encourage OOB    Diabetes mellitus  Keep BS under 150 to help control current infection  -145        Antibiotic allergies: Daptomycin rash, penicillins rash and hives, tolerates cephalosporins-discussed allergies and he states that he has developed significant rash and hives with penicillins several times, most recently a year and a half ago- will avoid penicillins

## 2020-12-04 NOTE — WOUND TEAM
"[facility]  Inpatient Services  New Ostomy Management & Teaching     HPI:  Reviewed  PMH: Reviewed   SH: Reviewed      Objective: appliance intact.    Colostomy 11/10/20 Transverse LUQ (Active)   Wound Image   11/30/20 0900   Stomal Appliance Assessment Changed    Stoma Assessment Pink    Stoma Shape Round;Budded Less Than One Inch    Stoma Size (in) 1.5    Peristomal Assessment Pink;Other (Comment)    Mucocutaneous Junction Intact    Treatment Appliance Changed;Cleansed with water/washcloth;Sugar;Crusted with stoma powder    Peristomal Protectant Paste Ring;No Sting Skin Prep    Stomal Appliance Paste Ring, 2\";2 1/4\" (57mm) CTF    Output (mL) 350 mL    Output Color Brown    WOUND RN ONLY - Stomal Appliance  2 Piece;Paste Ring, 2\";2 1/4\" (57mm) CTF    Appliance (Pouch) # [id number]    Appliance Brand [employer]    Appliance Supplier [employer]    Secure Start completed Yes    Ostomy Care Resources Provided [facility] Tip Sheet                Ostomy Appliance (type and size):  2 piece 2 1/4\" with paste ring, supplies left outside room due to legal hold     Interventions:  Previous appliance removed, cleansed skin with warm wash cloth, dried. New template made. Ostomy RN cut barrier to fit. Peristomal skin pink and some sores noted on proximal aspect of stoma. Crusted x2 using no sting and stoma powder. Pt noted to have area that appears to have a small superficial wound on lateral aspect of abdomen that is sitting right at the edge of the barrier. Small hydrocolloid dressing applied.This RN applied paste ring to barrier and then applied barrier to skin. Used friction to adhered barrier to skin. Pouch attached to barrier and pouch end closed.                 Pt education: No education today, pt in pain after just having frye catheter placed.      Evaluation:  Peristomal skin is pink with some sores. Crusting was done.  Pt unable to perform most ostomy care alone. Patient likely to discharge to SNF or Rehab, " currently on a legal hold with sitter at bedside. Stoma is viable and producing adequate amounts of stool.      Plan: Ostomy nurses to continue to follow for ostomy needs and teaching      Anticipated discharge needs: Supplies, supplier information, possible HH or outpatient ostomy clinic

## 2020-12-04 NOTE — PROGRESS NOTES
Pharmacy Kinetics 55 y.o. male on vancomycin day # 17 2020    Currently on Vancomycin 1000 mg iv q24hr  Provider specified end date:  Per ID - Tentatively planned for 10 days from date of last surgery/ source control (drains placed - tentative stop date of )     Indication for Treatment: IAI     Pertinent history per medical record: Admitted on 11/3/2020 for  concern of abdominal pain after surgical intervention. Concern for subsequent colonic perforation and possible abscess. ID and surgery consulted.     Other antibiotics: Ceftriaxone 2 grams q24H, fluconazole 400 mg q24H     Allergies: Daptomycin, Pcn [penicillins], Unasyn [ampicillin-sulbactam sodium], and Vancomycin   List concerns for renal function: BMI 58.16 kg/m2; albumin <3; abnormal LFTs     Pertinent cultures to date:   20 - wound (vj-splenic drain): E faecalis  11/10/20 - colon perforation: E faecalis; C albicans     MRSA nares swab if pneumonia is a concern (ordered/positive/negative/n-a): N/A    Recent Labs     20  0702 20  0551 20  0534   WBC 9.6 10.1 9.8   NEUTSPOLYS 63.10 54.10  --      Recent Labs     20  0702 20  0551 20  0534   BUN 16 16 15   CREATININE 0.95 1.01 0.88     No results for input(s): VANCOTROUGH, VANCOPEAK, VANCORANDOM in the last 72 hours.    Intake/Output Summary (Last 24 hours) at 2020 1531  Last data filed at 2020 1325  Gross per 24 hour   Intake 720 ml   Output 8120 ml   Net -7400 ml      BP (!) 97/58   Pulse (!) 107   Temp 36.6 °C (97.8 °F) (Temporal)   Resp 18   Ht 1.829 m (6')   Wt (!) 194.5 kg (428 lb 12.7 oz)   SpO2 94%  Temp (24hrs), Av.4 °C (97.6 °F), Min:36.2 °C (97.2 °F), Max:36.6 °C (97.8 °F)    A/P   1. Vancomycin dose change: Continue vancomycin 1000 mg IV Q24h (due @ 1230)  2. Next vancomycin level: 12/5 @ 1200  3. Goal trough: 10-15 mcg/mL  4. Comments: Tmax 97.8 without leukocytosis. Vital signs/renal indices stable. Tentative  vancomycin stop date 12/5 per ID; ID recommends repeat CT scan prior to stopping antibiotics (ordered). Last vancomycin trough above goal @ 17.9 mcg/mL, subsequently vanco dose reduced on 12/2. If therapy is extended, will check a vanco trough tomorrow @ 1200. Pharmacy to monitor and adjust as needed.     Sheron Matias, PharmD, BCOP

## 2020-12-04 NOTE — PROGRESS NOTES
Patient has a 1:1 sitter at bedside. I am covering for C.N.A sitter while she is on lunch break. I will continue to monitor.

## 2020-12-04 NOTE — PROGRESS NOTES
Hospital Medicine Daily Progress Note    Date of Service  12/4/2020    Chief Complaint  55 y.o. male admitted 11/3/2020 with abdominal pain    Hospital Course    55-year-old male with history of schizophrenia, diabetes, heart failure with ejection fraction 30% and atrial fibrillation presented 11/3 with left upper quadrant pain, initially the patient had perisplenic hematoma after fall however he developed fever and severe pain, CT scan showed perisplenic abscess with possible fistula and perforation, patient surgery for colostomy and for bowel perforation, blood culture on 11/31 showed Streptococcus sepsis,  A new drain was placed on 11/25, drain fluid culture from the abscess showed Streptococcus and yeast Candida, patient was evaluated by infectious disease and he was treated with ceftriaxone, fluconazole and later added vancomycin to treat Enterococcus since patient is PCN allergy stop date will be 12/5/2020..     Interval Problem Update  -Seen and examined the patient at bedside,   -Psych eval done rec legal hold and transfer to psych inpatient, will get re eval by Psych since patient has continued medical needs and will likely need LTAC or SNF.  -The patient has a chronic dyspnea on exertion, he is on room air.  -Labs are reviewed, improving on leukocytosis and kidney function.  -The patient is ready for discharge/placement   Consultants/Specialty  General surgeon  Infectious disease    Code Status  Full Code    Disposition  LTAC or SNF once psych clearance  Medically cleared for placement.      Review of Systems  Review of Systems   Constitutional: Negative.    HENT: Negative.    Respiratory: Negative.    Cardiovascular: Negative.    Gastrointestinal: Negative for abdominal pain, diarrhea, heartburn, nausea and vomiting.   Genitourinary: Negative.    Musculoskeletal: Negative.    Skin: Negative.    Neurological: Negative.    Psychiatric/Behavioral: Negative.         Physical Exam  Temp:  [36.2 °C (97.2  °F)-36.6 °C (97.8 °F)] 36.6 °C (97.8 °F)  Pulse:  [] 107  Resp:  [17-18] 18  BP: ()/(58-96) 97/58  SpO2:  [90 %-94 %] 94 %    Physical Exam  Constitutional:       Appearance: He is obese. He is not ill-appearing.   HENT:      Head: Normocephalic and atraumatic.      Comments: Bilateral facial rash  Eyes:      General: No scleral icterus.  Abdominal:      General: There is no distension.      Palpations: There is no mass.      Tenderness: There is abdominal tenderness. There is no guarding.      Comments: Colostomy and a drain in place  Wound vac in place   Musculoskeletal:         General: No swelling or deformity.      Right lower leg: No edema.      Left lower leg: No edema.   Skin:     General: Skin is warm.      Coloration: Skin is not jaundiced or pale.      Findings: No bruising.   Neurological:      General: No focal deficit present.      Mental Status: He is alert and oriented to person, place, and time. Mental status is at baseline.      Cranial Nerves: No cranial nerve deficit.      Motor: No weakness.         Fluids    Intake/Output Summary (Last 24 hours) at 12/4/2020 1421  Last data filed at 12/4/2020 1325  Gross per 24 hour   Intake 840 ml   Output 8120 ml   Net -7280 ml       Laboratory  Recent Labs     12/02/20  0702 12/03/20  0551 12/04/20  0534   WBC 9.6 10.1 9.8   RBC 3.38* 3.30* 3.59*   HEMOGLOBIN 8.7* 8.4* 8.9*   HEMATOCRIT 29.0* 28.2* 30.3*   MCV 85.8 85.5 84.4   MCH 25.7* 25.5* 24.8*   MCHC 30.0* 29.8* 29.4*   RDW 57.1* 56.4* 56.2*   PLATELETCT 472* 535* 577*   MPV 10.2 9.9 9.3     Recent Labs     12/02/20  0702 12/03/20  0551 12/04/20  0534   SODIUM 135 134* 137   POTASSIUM 4.0 4.2 4.3   CHLORIDE 94* 94* 95*   CO2 33 33 34*   GLUCOSE 141* 117* 129*   BUN 16 16 15   CREATININE 0.95 1.01 0.88   CALCIUM 8.9 8.8 8.8                   Imaging  IR-MIDLINE CATHETER INSERTION WO GUIDANCE > AGE 3   Final Result                  Ultrasound-guided midline placement performed by qualified  nursing staff    as above.          CT-DRAIN-PERITONEAL   Final Result      Successful splenic abscess drainage tube placement.      Plan: Twice daily flushes with 10 mL of sterile saline. Monitor outputs.         CT-ABDOMEN-PELVIS WITH   Final Result      1.  Intraparenchymal collection in the anterior aspect of the spleen has increased in size from the prior exam.      2.  Small amount of free fluid in the pelvis has increased from the prior exam.      3.  Small bilateral pleural effusions layer posteriorly, left greater than right, slightly decreased from the prior exam. Adjacent airspace disease is likely atelectasis.      4.  Cholelithiasis.      5.  Right renal scarring.      TY-LATDKEE-3 VIEW   Final Result      1.  No evidence of bowel obstruction.   2.  Postoperative changes as described.      CT-ABDOMEN-PELVIS W/O   Final Result      Postsurgical changes with a left abdominal stoma. Parastomal densities may be related to postsurgical hematoma measuring up to 2.8 x 2.1 cm.      Small amount of free fluid in the abdomen and pelvis.      Collection along the anterior spleen measures approximately 2.9 x 4.3 cm is decreased in size compared to the prior examination. Evaluation is limited by lack of intravenous contrast.      Third spacing.      Trace free intraperitoneal air is likely related to recent surgery.      Moderate left and small right pleural effusions with overlying atelectasis/consolidation.      Cortical scarring of the right kidney.      Cholelithiasis.         DX-CHEST-PORTABLE (1 VIEW)   Final Result      1.  Bilateral airspace and interstitial infiltrates.      2.  Cardiomegaly.      US-RENAL   Final Result    Examination was limited due to poor sound penetration related to body habitus.   Grossly normal appearance of the kidneys.      EC-ECHOCARDIOGRAM COMPLETE W/ CONT   Final Result      DX-CHEST-PORTABLE (1 VIEW)   Final Result      No pneumothorax identified following right subclavian line  placement      Worsening atelectasis with consolidation at the bases not excluded      Stable cardiac silhouette enlargement      CT-ABDOMEN-PELVIS WITH   Final Result      Interval decrease in size of perisplenic abscess. Pigtail catheter now in place      Abscess has likely fistulous communication with the abutting splenic flexure of the colon. Colon is otherwise normal in appearance      Mild hepatosplenomegaly      Decreasing small left pleural effusion      Subacute anterior wedge compression fractures in the caudal thoracic spine      CT-DRAIN-PERITONEAL   Final Result      1.  CT guided perisplenic abscess catheter drainage.   2.  The current plan is to obtain a follow-up CT in 5-7 days..      OUTSIDE IMAGES-CT CHEST   Final Result      OUTSIDE IMAGES-CT ABDOMEN /PELVIS   Final Result      CT-ABDOMEN-PELVIS WITH   Final Result   Addendum 1 of 1   Addendum:   A prior noncontrast CT of the abdomen and pelvis from [facility] dated 10/19/2020 was made available for comparison. On the    prior study a perisplenic/subcapsular fluid collection was seen however    did not contain air at that time and    there was no discrete evidence of communication with the transverse colon.    Communication with splenic flexure of the colon and air within the    collection are new from the prior study.      Final      DX-CHEST-PORTABLE (1 VIEW)   Final Result      Slight interval interval improvement in congestive heart failure.      CT-ABDOMEN-PELVIS WITH    (Results Pending)        Assessment/Plan  * Spontaneous perforation of colon (HCC)- (present on admission)  Assessment & Plan  Admitted with LUQ abscess and perforation at splenic flexure   Segmental colectomy, colostomy, Patti pouch, I & D of abdominal wall abscess  Wound cultures: enterococcus and candida  And blood culture showed strep  Patient was evaluated and followed by ID  To continue ceftriaxone, fluconazole and vancomycin for 10 days after  last surgery, last dose will be around 12/5  Repeat CT abdomen and pelvis today  Surgery signed off to follow-up with Dr. Desouza as outpatient    Sepsis ([facility])  Assessment & Plan  Secondary to perforation of colon,  Wound cultures: enterococcus & candida.  Started on Linezolid and Fluconazole. But his QTc is prolonged.  - DCed linezolide and started on vancomycin.  - continue Fluconazole  - DC Zyvox due to prolonged QTc  - continue  vancomycin (11/17)  - continue ceftriaxone 11/17and fluconazole  - Dced Flagyl on 11/21 due to nausea - nausea improved      Leucocytosis  Assessment & Plan  Due to intra-abdominal infection and abscess   continue antibiotics  Labs daily  Improving      Chronic heart failure ([facility])  Assessment & Plan  History of ischemic cardiomyopathy with ejection fraction 30/40%, no acute decompensation during this admission  With right ventricle dysfunction  Continue metoprolol and lisinopril and Aldactone  Continue amiodarone for atrial fibrillation  Patient Bicarb trending up will decrease lasix to 20 mg once dailiy  Weight daily  I and O -1800 adequate diuresis    Paroxysmal A-fib with RVR ([facility])- (present on admission)  Assessment & Plan  Seen by cardiac electrophysiologist in 2019 and deemed not to be good candidate for anticoagulation due to frequent falls 2/2 orthostatic hypotension  CHADSVASC of 3   He was given digoxin however discontinued due to toxicity  Heart rate is controlled  Continue metoprolol XL 75 mg twice daily and amiodarone 20 mg daily  Continue holding anticoagulation at this time for possible surgery  To follow-up with cardiologist as outpatient    Prolonged QT interval  Assessment & Plan  Patient's QTc on admission 477 increased to 535 with Linezolid, zofran, zoloft, olanzapine.  DCed Linezolid, zofran, olanzapine & zoloft  QTc improved from 535 to 425 to 473  Seen by psychiatry for medication recommendations started on resperidone and modafinil.  - avoid QT prolonging  medication.    Hypothyroidism  Assessment & Plan  Obese male   TSH 16.0 and T4 1.14  start on Levothyroxine 112mcg daily.  Repeat thyroid function after 6 to 8 weeks    Schizophrenia (HCC)  Assessment & Plan  History of schizophrenia on olanzapine and sertraline.  Developed hallucination during this hospitalization, also had delayed prolonged QTC  Evaluated by psychiatrist and started with risperidone and modafinil, discontinue Zoloft and olanzapine  Stable at this time continue monitoring and follow-up with psychiatrist.  Patient reported feeling suicidal on 11/30/2020, patient placed on legal hold and psych consult placed    Elevated digoxin level  Assessment & Plan  H/o N/v and visual hallucinations including yellow halos, circles and squares  AFTER STARTING digoxin and 11/14 digoxin level 1.5 & on 11/17 Repeat digoxin level > 5,   & 0.8  Resolved  Keep holding digoxin    SRUTHI (acute kidney injury) ([facility])  Assessment & Plan  Resolved    Type 2 diabetes mellitus, without long-term current use of insulin, with peripheral neuropathy ([facility])- (present on admission)  Assessment & Plan  Patient states blood sugar well controlled at home with metformin.   Last a1c of 7.8 on 11/10/20   - continue Lantus 10U daily  - Sliding scale insulin with hypoglycemia protocol  - Ensure strict glucose control          VTE prophylaxis: lovenox

## 2020-12-05 LAB
ANION GAP SERPL CALC-SCNC: 8 MMOL/L (ref 7–16)
BASOPHILS # BLD AUTO: 0.7 % (ref 0–1.8)
BASOPHILS # BLD: 0.07 K/UL (ref 0–0.12)
BUN SERPL-MCNC: 17 MG/DL (ref 8–22)
CALCIUM SERPL-MCNC: 8.9 MG/DL (ref 8.5–10.5)
CHLORIDE SERPL-SCNC: 95 MMOL/L (ref 96–112)
CO2 SERPL-SCNC: 31 MMOL/L (ref 20–33)
CREAT SERPL-MCNC: 0.96 MG/DL (ref 0.5–1.4)
EOSINOPHIL # BLD AUTO: 1 K/UL (ref 0–0.51)
EOSINOPHIL NFR BLD: 9.5 % (ref 0–6.9)
ERYTHROCYTE [DISTWIDTH] IN BLOOD BY AUTOMATED COUNT: 58.6 FL (ref 35.9–50)
GLUCOSE BLD-MCNC: 132 MG/DL (ref 65–99)
GLUCOSE BLD-MCNC: 161 MG/DL (ref 65–99)
GLUCOSE BLD-MCNC: 97 MG/DL (ref 65–99)
GLUCOSE SERPL-MCNC: 126 MG/DL (ref 65–99)
HCT VFR BLD AUTO: 31.7 % (ref 42–52)
HGB BLD-MCNC: 9.5 G/DL (ref 14–18)
IMM GRANULOCYTES # BLD AUTO: 0.07 K/UL (ref 0–0.11)
IMM GRANULOCYTES NFR BLD AUTO: 0.7 % (ref 0–0.9)
LYMPHOCYTES # BLD AUTO: 2.94 K/UL (ref 1–4.8)
LYMPHOCYTES NFR BLD: 28 % (ref 22–41)
MCH RBC QN AUTO: 25.7 PG (ref 27–33)
MCHC RBC AUTO-ENTMCNC: 30 G/DL (ref 33.7–35.3)
MCV RBC AUTO: 85.9 FL (ref 81.4–97.8)
MONOCYTES # BLD AUTO: 0.94 K/UL (ref 0–0.85)
MONOCYTES NFR BLD AUTO: 9 % (ref 0–13.4)
NEUTROPHILS # BLD AUTO: 5.47 K/UL (ref 1.82–7.42)
NEUTROPHILS NFR BLD: 52.1 % (ref 44–72)
NRBC # BLD AUTO: 0 K/UL
NRBC BLD-RTO: 0 /100 WBC
PLATELET # BLD AUTO: 586 K/UL (ref 164–446)
PMV BLD AUTO: 9.4 FL (ref 9–12.9)
POTASSIUM SERPL-SCNC: 4.2 MMOL/L (ref 3.6–5.5)
RBC # BLD AUTO: 3.69 M/UL (ref 4.7–6.1)
SODIUM SERPL-SCNC: 134 MMOL/L (ref 135–145)
VANCOMYCIN TROUGH SERPL-MCNC: 14.6 UG/ML (ref 10–20)
WBC # BLD AUTO: 10.5 K/UL (ref 4.8–10.8)

## 2020-12-05 PROCEDURE — 700111 HCHG RX REV CODE 636 W/ 250 OVERRIDE (IP): Performed by: STUDENT IN AN ORGANIZED HEALTH CARE EDUCATION/TRAINING PROGRAM

## 2020-12-05 PROCEDURE — 700102 HCHG RX REV CODE 250 W/ 637 OVERRIDE(OP): Performed by: INTERNAL MEDICINE

## 2020-12-05 PROCEDURE — 700102 HCHG RX REV CODE 250 W/ 637 OVERRIDE(OP): Performed by: PSYCHIATRY & NEUROLOGY

## 2020-12-05 PROCEDURE — A9270 NON-COVERED ITEM OR SERVICE: HCPCS | Performed by: STUDENT IN AN ORGANIZED HEALTH CARE EDUCATION/TRAINING PROGRAM

## 2020-12-05 PROCEDURE — 700105 HCHG RX REV CODE 258: Performed by: STUDENT IN AN ORGANIZED HEALTH CARE EDUCATION/TRAINING PROGRAM

## 2020-12-05 PROCEDURE — A9270 NON-COVERED ITEM OR SERVICE: HCPCS | Performed by: INTERNAL MEDICINE

## 2020-12-05 PROCEDURE — A9270 NON-COVERED ITEM OR SERVICE: HCPCS | Performed by: PSYCHIATRY & NEUROLOGY

## 2020-12-05 PROCEDURE — 700111 HCHG RX REV CODE 636 W/ 250 OVERRIDE (IP): Performed by: HOSPITALIST

## 2020-12-05 PROCEDURE — 82962 GLUCOSE BLOOD TEST: CPT | Mod: 91

## 2020-12-05 PROCEDURE — 700102 HCHG RX REV CODE 250 W/ 637 OVERRIDE(OP): Performed by: STUDENT IN AN ORGANIZED HEALTH CARE EDUCATION/TRAINING PROGRAM

## 2020-12-05 PROCEDURE — 700111 HCHG RX REV CODE 636 W/ 250 OVERRIDE (IP): Performed by: INTERNAL MEDICINE

## 2020-12-05 PROCEDURE — 85025 COMPLETE CBC W/AUTO DIFF WBC: CPT

## 2020-12-05 PROCEDURE — 90839 PSYTX CRISIS INITIAL 60 MIN: CPT | Performed by: PSYCHOLOGIST

## 2020-12-05 PROCEDURE — 770001 HCHG ROOM/CARE - MED/SURG/GYN PRIV*

## 2020-12-05 PROCEDURE — 80048 BASIC METABOLIC PNL TOTAL CA: CPT

## 2020-12-05 PROCEDURE — 700105 HCHG RX REV CODE 258: Performed by: INTERNAL MEDICINE

## 2020-12-05 PROCEDURE — 99232 SBSQ HOSP IP/OBS MODERATE 35: CPT | Performed by: STUDENT IN AN ORGANIZED HEALTH CARE EDUCATION/TRAINING PROGRAM

## 2020-12-05 PROCEDURE — 80202 ASSAY OF VANCOMYCIN: CPT

## 2020-12-05 PROCEDURE — 99232 SBSQ HOSP IP/OBS MODERATE 35: CPT | Performed by: INTERNAL MEDICINE

## 2020-12-05 PROCEDURE — 36415 COLL VENOUS BLD VENIPUNCTURE: CPT

## 2020-12-05 RX ORDER — FLUCONAZOLE 200 MG/1
400 TABLET ORAL DAILY
Status: COMPLETED | OUTPATIENT
Start: 2020-12-06 | End: 2020-12-11

## 2020-12-05 RX ORDER — LORAZEPAM 0.5 MG/1
0.5 TABLET ORAL 3 TIMES DAILY PRN
Status: DISPENSED | OUTPATIENT
Start: 2020-12-05 | End: 2020-12-08

## 2020-12-05 RX ADMIN — FAMOTIDINE 20 MG: 20 TABLET, FILM COATED ORAL at 05:02

## 2020-12-05 RX ADMIN — RISPERIDONE 0.5 MG: 1 TABLET ORAL at 05:02

## 2020-12-05 RX ADMIN — CEFTRIAXONE SODIUM 2 G: 2 INJECTION, POWDER, FOR SOLUTION INTRAMUSCULAR; INTRAVENOUS at 05:01

## 2020-12-05 RX ADMIN — PROCHLORPERAZINE EDISYLATE 10 MG: 5 INJECTION INTRAMUSCULAR; INTRAVENOUS at 10:51

## 2020-12-05 RX ADMIN — AMIODARONE HYDROCHLORIDE 200 MG: 200 TABLET ORAL at 05:02

## 2020-12-05 RX ADMIN — INSULIN GLARGINE 15 UNITS: 100 INJECTION, SOLUTION SUBCUTANEOUS at 05:15

## 2020-12-05 RX ADMIN — KETOCONAZOLE: 20 CREAM TOPICAL at 05:01

## 2020-12-05 RX ADMIN — Medication 400 MG: at 05:02

## 2020-12-05 RX ADMIN — FUROSEMIDE 20 MG: 10 INJECTION, SOLUTION INTRAMUSCULAR; INTRAVENOUS at 05:01

## 2020-12-05 RX ADMIN — SPIRONOLACTONE 25 MG: 50 TABLET ORAL at 05:02

## 2020-12-05 RX ADMIN — INSULIN HUMAN 3 UNITS: 100 INJECTION, SOLUTION PARENTERAL at 17:57

## 2020-12-05 RX ADMIN — METOPROLOL SUCCINATE 75 MG: 50 TABLET, EXTENDED RELEASE ORAL at 05:02

## 2020-12-05 RX ADMIN — FLUCONAZOLE 400 MG: 200 TABLET ORAL at 05:02

## 2020-12-05 RX ADMIN — HYDROCODONE BITARTRATE AND ACETAMINOPHEN 1 TABLET: 5; 325 TABLET ORAL at 00:10

## 2020-12-05 RX ADMIN — CETIRIZINE HYDROCHLORIDE 10 MG: 10 TABLET, FILM COATED ORAL at 05:02

## 2020-12-05 RX ADMIN — Medication 400 MG: at 17:56

## 2020-12-05 RX ADMIN — LEVOTHYROXINE SODIUM 112 MCG: 112 TABLET ORAL at 05:02

## 2020-12-05 RX ADMIN — MODAFINIL 100 MG: 100 TABLET ORAL at 05:02

## 2020-12-05 RX ADMIN — TRAZODONE HYDROCHLORIDE 50 MG: 50 TABLET ORAL at 21:53

## 2020-12-05 RX ADMIN — RISPERIDONE 1 MG: 1 TABLET ORAL at 17:56

## 2020-12-05 RX ADMIN — VANCOMYCIN HYDROCHLORIDE 1000 MG: 500 INJECTION, POWDER, LYOPHILIZED, FOR SOLUTION INTRAVENOUS at 12:22

## 2020-12-05 RX ADMIN — METOPROLOL SUCCINATE 75 MG: 50 TABLET, EXTENDED RELEASE ORAL at 17:53

## 2020-12-05 RX ADMIN — LISINOPRIL 20 MG: 20 TABLET ORAL at 05:02

## 2020-12-05 ASSESSMENT — ENCOUNTER SYMPTOMS
NAUSEA: 0
MUSCULOSKELETAL NEGATIVE: 1
RESPIRATORY NEGATIVE: 1
NEUROLOGICAL NEGATIVE: 1
MYALGIAS: 1
PSYCHIATRIC NEGATIVE: 1
CARDIOVASCULAR NEGATIVE: 1
ABDOMINAL PAIN: 0
VOMITING: 0
CONSTITUTIONAL NEGATIVE: 1
SHORTNESS OF BREATH: 0
FEVER: 0
HEARTBURN: 0
COUGH: 0
ABDOMINAL PAIN: 1
DIARRHEA: 0
CHILLS: 0

## 2020-12-05 ASSESSMENT — PATIENT HEALTH QUESTIONNAIRE - PHQ9
6. FEELING BAD ABOUT YOURSELF - OR THAT YOU ARE A FAILURE OR HAVE LET YOURSELF OR YOUR FAMILY DOWN: MORE THAN HALF THE DAYS
9. THOUGHTS THAT YOU WOULD BE BETTER OFF DEAD, OR OF HURTING YOURSELF: SEVERAL DAYS
5. POOR APPETITE OR OVEREATING: MORE THAN HALF THE DAYS
1. LITTLE INTEREST OR PLEASURE IN DOING THINGS: NEARLY EVERY DAY
SUM OF ALL RESPONSES TO PHQ QUESTIONS 1-9: 19
8. MOVING OR SPEAKING SO SLOWLY THAT OTHER PEOPLE COULD HAVE NOTICED. OR THE OPPOSITE, BEING SO FIGETY OR RESTLESS THAT YOU HAVE BEEN MOVING AROUND A LOT MORE THAN USUAL: NOT AT ALL
SUM OF ALL RESPONSES TO PHQ9 QUESTIONS 1 AND 2: 6
4. FEELING TIRED OR HAVING LITTLE ENERGY: NEARLY EVERY DAY
7. TROUBLE CONCENTRATING ON THINGS, SUCH AS READING THE NEWSPAPER OR WATCHING TELEVISION: MORE THAN HALF THE DAYS
2. FEELING DOWN, DEPRESSED, IRRITABLE, OR HOPELESS: NEARLY EVERY DAY
3. TROUBLE FALLING OR STAYING ASLEEP OR SLEEPING TOO MUCH: NEARLY EVERY DAY

## 2020-12-05 ASSESSMENT — PAIN DESCRIPTION - PAIN TYPE: TYPE: ACUTE PAIN

## 2020-12-05 NOTE — PROGRESS NOTES
Possible iv infiltration on the left forearm. ELISA Knutson informed and a hot compress was applied. Patient left CT in good spirits.

## 2020-12-05 NOTE — CONSULTS
"BRIEF PSYCHIATRIC CONSULT NOTE: patient seen and assessed, Affect is brighter and he is able to laugh and smile appropriately. He denies current and active suicidal thoughts, plans, and intent. He is future focused on physical recovery and following his treatment plan. He is also future focused on his values of helping others and states, \"I just need to take it one day at a time.\" He does report anxiety about falling out of bed and requests someone look in on him every so often. He also would be ok with a tele-sitter. He reported a decreased in his anxiety after speaking with this writer.     The patient no longer meets criteria for a legal hold. LEGAL HOLD DISCONTINUED.     Full note to follow.  -Legal Hold:dc'd  -Unspecified Anxiety Disorder, Unspecified Depressive Disorder  -Psychiatry and Psychology will continue to follow  -Consider checking in on the patient once an hour or when able OR consider tele-sitter for physical safety (e.g. making sure he does not fall out of bed.) This will also help with his anxiety and decrease the likelihood that the patient experiences active suicidal thoughts again.   -Will follow           "

## 2020-12-05 NOTE — PROGRESS NOTES
Drain dressing changed, and LUQ dressing changed with packing.  Pt tolerated well.       Pt sat edge of bed, tolerated well. Pt motivated to work with PT, asking for exercises to do in bed.

## 2020-12-05 NOTE — CONSULTS
"PSYCHOLOGICAL FOLLOW-UP:  Reason for admission: Perforated sigmoid colon (HCC) [K63.1]  Length of Visit: 60min    Legal status: Legal Status: Involuntary However, legal hold was discontinued by the end of the session.    Chief Complaint: \"I just have to take it one day at a time.\"    HPI: Met with the patient to reassess risk level and provide brief individual psychotherapy focused on safety planning. Patient presented with a euthymic affect and reported a \"better\" mood. He was able to laugh and smile appropriately. He denied current and active suicidal thoughts, plans to commit suicide, and intent to commit suicide. He denied current auditory hallucinations commanding him to hurt himself. He was future focused on his physical recovery and following the treatment plan set forth by his medical team. He was also future focused on his value of helping others and reported that this is what motivates him to get better. He stated multiple times his intention to \"take it one day at a time.\" The patient reported his belief that he was doing well on his previous regime of medications and concern that they then seemed to not work as well. Discussed how physical illness can impact psychiatric medication effectiveness and to discuss this further with psychiatry. The patient did report that he was anxious about not having somewhere to live. However, he did report that someone is helping him find a new group home and he now knows he wont be living on the streets. This writer confirmed for the patient that his outpatient  are working on finding him a new group home once he is no longer in need of medical treatment. The patient smiled. Spent some time safety planning for when he experiences suicidal thoughts again. The patient was able to identify reaching out to professionals for help, staying on his medications, using his value of helping others to \"ignore\" the thoughts, and spending time engaging in pleasant " "activities as ways he can cope with suicidal thoughts. Encouraged the patient to continue expanding on this plan, especially as he recovers physically.     The patient reported anxiety about being \"left alone\" because he is afraid he might fall out of bed. He requested someone check in on him to make sure this has not happened. He was also agreeable to a tele-sitter if this was something that his team wanted to pursue.     Psychiatric Examination:  Vitals: Blood pressure 108/66, pulse 99, temperature 36.2 °C (97.2 °F), temperature source Temporal, resp. rate 18, height 1.829 m (6'), weight (!) 194.5 kg (428 lb 12.7 oz), SpO2 91 %.  Musculoskeletal: normal psychomotor activity, no tics or unusual mannerisms noted  Appearance and Eye Contact: appropriate dress and grooming. Behavior is calm, cooperative,  appropriate eye contact  Attention/Alertness: Alert  Thought Process: Linear, Logical and Goal Directed    Thought Content: No psychotic processes noted  Speech: Clear with normal rate and rhythm  Mood: \"better\"            Affect: euthymic, laughing and smiling appropriately          SI/HI: Denies    Memory: Recent and remote memory appear overall intact    Orientation: alert, oriented to person, place and time  Insight into symptoms: good  Judgement into symptoms:good    ASSESSMENT: The patient no longer meets criteria for a legal hold. LEGAL HOLD DISCONTINUED. He was actively engaged in psychotherapy so will continue to provide this service while he is in the acute care setting.     DSM5 Diagnostic Considerations:   Unspecified Anxiety Disorder; Unspecified Depressive Disorder      PLAN:  Legal status: Voluntary Legal hold discontinued     Anticipate F/U within 48 hours.     Records reviewed: yes    Psychiatry and Psychology will continue to follow    Consider checking in on the patient once an hour or when able OR consider tele-sitter for physical safety (e.g. making sure he does not fall out of bed.) This will " also help with his anxiety and decrease the likelihood that the patient experiences active suicidal thoughts again.     Will continue to follow for brief psychotherapy.     Thank you for the consult.    Madhuri Fernandez, Ph.D.

## 2020-12-05 NOTE — PROGRESS NOTES
Bedside report received.  Assessment completed.  A&O x 4.   On 0L O2. Denies SOB.  Denies N/V and new numbness or tingling.  Patient ambulates with two assist. Bed alarm n/a.   Pain managed with prescribed medications.  Tolerating low fiber/ GI soft diet.  + void via frye, + flatus, last BM 12/4 via ostomy.  Surgical incisions: LLQ ostomy, midline incision with wound vac  L wrist IV removed d/t infiltration during CT at 2000.     Reviewed plan with of care with patient. Call light and personal belongings with in reach. Hourly rounding in place. All needs met at this time.

## 2020-12-05 NOTE — PROGRESS NOTES
"Pharmacy Kinetics 55 y.o. male on vancomycin day # 18 2020    Currently on Vancomycin 1000 mg iv q24hr  Provider specified end date: 20    Indication for Treatment: IAI     Pertinent history per medical record: Admitted on 11/3/2020 for  concern of abdominal pain after surgical intervention. Concern for subsequent colonic perforation and possible abscess. ID and surgery consulted.     Other antibiotics: Ceftriaxone 2 grams q24H, fluconazole 400 mg q24H     Allergies: Daptomycin, Pcn [penicillins], Unasyn [ampicillin-sulbactam sodium], and Vancomycin   List concerns for renal function: BMI 58.16 kg/m2; albumin <3; abnormal LFTs     Pertinent cultures to date:   20 - wound (vj-splenic drain): E faecalis  11/10/20 - colon perforation: E faecalis; C albicans    MRSA nares swab if pneumonia is a concern (ordered/positive/negative/n-a): N/A    Recent Labs     20  0551 20  0534 20  0603   WBC 10.1 9.8 10.5   NEUTSPOLYS 54.10  --  52.10     Recent Labs     20  0551 20  0534 20  0603   BUN 16 15 17   CREATININE 1.01 0.88 0.96     No results for input(s): VANCOTROUGH, VANCOPEAK, VANCORANDOM in the last 72 hours.    Intake/Output Summary (Last 24 hours) at 2020 1211  Last data filed at 2020 0717  Gross per 24 hour   Intake 360 ml   Output 5435 ml   Net -5075 ml      /66   Pulse 99   Temp 36.2 °C (97.2 °F) (Temporal)   Resp 18   Ht 1.829 m (6')   Wt (!) 194.5 kg (428 lb 12.7 oz)   SpO2 91%  Temp (24hrs), Av.3 °C (97.3 °F), Min:36 °C (96.8 °F), Max:36.6 °C (97.8 °F)    A/P   1. Vancomycin dose change: Continue vancomycin 1000 mg IV Q24h (due @ 1230)  2. Next vancomycin level: Today @ 1200  3. Goal trough: 10-15 mcg/mL  4. Comments: Tmax 97.8 without leukocytosis. Vital signs and renal indices stable. Patient maintaining good UOP. No s/s of vancomycin toxicity present. Repeat CT A/P yesterday dictates \"fluid collection has decreased in size with a " "small amount of residual fluid seen surrounding the percutaneous catheter drain tip.\" Therapy extended to 12/11 per ID. Last vanco trough supratherapeutic on 12/1, subsequently dose reduced to 1000 mg IV Q24h. Trough today resulted therapeutic @ 14.6 mcg/mL. Will continue with vancomycin 1000 mg IV Q24h as outlined above. Repeat level likely unnecessary unless therapy is extended or concerns for clearance arise.     Sheron Matias, PharmD, BCOP      "

## 2020-12-05 NOTE — PROGRESS NOTES
"Pt given exercise band for strengthening exercises while in bed. This RN discussed, ankle flexion, heel slides, ER of shoulders, and bicep curls with use of band. RN demonstrated and Pt return demonstrated.     Pt reports that as long as he doesn't end up in a wheel chair he will \"be ok\". The thought of being wheel chair bound makes him feel \"hopeless\" .    Close obs in effect. Pt has 1:1 sitter at bedside, RN rounding.   "

## 2020-12-05 NOTE — PROGRESS NOTES
Infectious Disease Progress Note    Author: Cristy Holley M.D. Date & Time of service: 12/5/2020  11:55 AM    Chief Complaint:  Intrabdominal abscess and bacteremia     Interval History:   55 y.o. diabetic male admitted 11/3/2020.+ A. fib, CHD, and severe morbid obesity.  Seen by ID in the past for right foot abscess with cultures positive for MRSA and Proteus.  s/p GLF approximately 1 month ago and was admitted to [facility] with left upper quadrant pain.  CT at the time revealed a subcapsular splenic hematoma. Presented to ED complaining of abdominal pain.  11/9 AF WBC 9.4 Less pain in side but remains tender to touch. Denies SE Zyvox-counseled about sxs serotonin syndrome  11/11 T-max 99.7 WBC 15.4 patient underwent colectomy, colostomy placement and I&D of flank wound with wound VAC placement on 11/10/2020 by Dr. Desouza.  Postop course complicated by hypotension and transferred to the ICU.  On pressors.  Patient complaining of shortness of breath as well as lightheadedness.  Also has postop abdominal pain.  11/12 afebrile WBC 13.3.  Patient weaned off pressors overnight.  Operative cultures growing group D Enterococcus and yeast.  Patient feeling better today.  Improved shortness of breath.  Abdominal pain stable  11/13 afebrile WBC 13.8 patient continues to feel better.  He is inquiring when he can have food by mouth.  11/14 afebrile WBC 9.9.  Patient feeling a bit better today.  His diet has been slowly advanced.  He is however complaining of some sharp abdominal pain when he coughs  11/16  AF, O2 RA, abdominal pain and poor appetite.   11/17 AF, O2 1 L NC , WBC 19.1. Drain output 465 cc yesterday. Ongoing nausea, poor appetite and abdomen is quite tender on palpation.  Will broaden antibiotics.   11/18 AF, O2 1 L NC, WBC 18.1, Drain output- 610 cc. Patient still has abdominal pain and significant nausea.  Examined abdominal wound photos and discussed with wound care team.  He has a large wound but  clean appearing base with granulation tissue.  No obvious infection in the surrounding tissues.  Left side wound with packing in place, ongoing drainage but clearing .    AF, tachycardia and hypertensive , WBC 17.2, drain output-205 cc yesterday. C/o nausea and agree with plan to transition Flagyl to IV to see if this helps.     AF WBC 13.6 feels OK-abd pain controlled. Poor appetite and not eating much. Denies SE abx-nausea resolved   AF WBC 16 no new complaints-no new cough, dyspnea and abd pain the same. Appetite so-so.   AF WBC 13 CT reviewed H and H continues to decrease. Somnolent today-no acute events overnight   AF 11.8 had new drain placed-tolerated well-still poor appetite    AF WBC 11.4 cxs with enterococcus-tired today. No new complaints   AF WBC 9.6 c/o itching face and back. Pain controlled    AF PICC bandage itchy-VAC changed this am-pictures reviewed. No new complaints   AF suicidal ideation and seeing spirit dog noted-psych eval ordered   AF WBC 9.6 has sitter-feeling a little more positive. Rash on face flaking   AF WBC 9.8 rash on face resolving. Still has 1:1 sitter-awaiting placement. Wound care notes and pics reviewed   AF WBC 10.5 CT reviewed-much improved-small amount of residual fluid noted    Review of Systems:  Review of Systems   Constitutional: Negative for chills and fever.   Respiratory: Negative for cough and shortness of breath.    Cardiovascular: Negative for chest pain.   Gastrointestinal: Positive for abdominal pain. Negative for diarrhea, nausea and vomiting.   Musculoskeletal: Positive for joint pain and myalgias.   Skin: Positive for rash. Negative for itching.   All other systems reviewed and are negative.      Hemodynamics:  Temp (24hrs), Av.3 °C (97.3 °F), Min:36 °C (96.8 °F), Max:36.6 °C (97.8 °F)  Temperature: 36.2 °C (97.2 °F)  Pulse  Av.5  Min: 51  Max: 161   Blood Pressure: 108/66      Physical  Exam:  Physical Exam  Vitals signs and nursing note reviewed.   Constitutional:       General: He is not in acute distress.     Appearance: He is obese. He is not ill-appearing, toxic-appearing or diaphoretic.   HENT:      Nose: No rhinorrhea.      Mouth/Throat:      Pharynx: No oropharyngeal exudate.   Eyes:      General: No scleral icterus.     Extraocular Movements: Extraocular movements intact.      Pupils: Pupils are equal, round, and reactive to light.   Neck:      Musculoskeletal: Neck supple. No muscular tenderness.   Cardiovascular:      Rate and Rhythm: Tachycardia present. Rhythm irregular.   Pulmonary:      Effort: Pulmonary effort is normal. No respiratory distress.      Breath sounds: No stridor. No wheezing.      Comments: Line right chest removed  Abdominal:      General: There is no distension.      Palpations: Abdomen is soft.      Tenderness: There is abdominal tenderness. There is no guarding or rebound.      Comments: Wound VAC in place, no sign infection in surrounding tissue-measures 11 X7 X 3 cm  Ostomy     Musculoskeletal:      Right lower leg: Edema present.      Left lower leg: Edema present.   Skin:     General: Skin is warm.      Coloration: Skin is not jaundiced.      Findings: Bruising and rash present.      Comments: Rash on face-not on chest or abd. Macularpapular, flaking   Unable to visualize back   Neurological:      General: No focal deficit present.         Meds:    Current Facility-Administered Medications:   •  HYDROcodone-acetaminophen  •  furosemide  •  insulin glargine  •  vancomycin  •  ketoconazole  •  acetaminophen  •  diphenhydrAMINE  •  metoclopramide  •  risperiDONE  •  risperiDONE  •  magnesium oxide  •  modafinil  •  levothyroxine  •  prochlorperazine  •  metoprolol SR  •  lisinopril  •  amiodarone  •  traZODone  •  Metoprolol Tartrate  •  Respiratory Therapy Consult  •  MD Alert...Vancomycin per Pharmacy  •  spironolactone  •  cetirizine  •  famotidine  •  insulin  regular **AND** POC Blood Glucose **AND** NOTIFY MD and PharmD **AND** glucose **AND** dextrose 50%  •  albuterol    Labs:  Recent Labs     12/03/20  0551 12/04/20  0534 12/05/20  0603   WBC 10.1 9.8 10.5   RBC 3.30* 3.59* 3.69*   HEMOGLOBIN 8.4* 8.9* 9.5*   HEMATOCRIT 28.2* 30.3* 31.7*   MCV 85.5 84.4 85.9   MCH 25.5* 24.8* 25.7*   RDW 56.4* 56.2* 58.6*   PLATELETCT 535* 577* 586*   MPV 9.9 9.3 9.4   NEUTSPOLYS 54.10  --  52.10   LYMPHOCYTES 28.40  --  28.00   MONOCYTES 8.30  --  9.00   EOSINOPHILS 7.80*  --  9.50*   BASOPHILS 0.80  --  0.70   RBCMORPHOLO Present  --   --      Recent Labs     12/03/20  0551 12/04/20  0534 12/05/20  0603   SODIUM 134* 137 134*   POTASSIUM 4.2 4.3 4.2   CHLORIDE 94* 95* 95*   CO2 33 34* 31   GLUCOSE 117* 129* 126*   BUN 16 15 17     Recent Labs     12/03/20  0551 12/04/20  0534 12/05/20  0603   CREATININE 1.01 0.88 0.96       Imaging:  Ct-abdomen-pelvis W/o    Result Date: 11/17/2020 11/17/2020 1:51 PM HISTORY/REASON FOR EXAM:  Nausea/vomiting; elevated WBC; R/O abscess. TECHNIQUE/EXAM DESCRIPTION: CT scan of the abdomen and pelvis without contrast. Noncontrast helical scanning was obtained from the diaphragmatic domes through the pubic symphysis. Low dose optimization technique was utilized for this CT exam including automated exposure control and adjustment of the mA and/or kV according to patient size. COMPARISON: 11/8/2020. FINDINGS: There is a moderate left and small right pleural effusion with overlying atelectasis/consolidation. Trace free air is seen in the abdomen. Evaluation of parenchymal and vascular structures is limited by the lack of intravenous contrast. The liver appears  unremarkable. There are gallstones within the gallbladder. There is hypodensity along the anterior aspect of the spleen measuring approximately 4.3 x 2.9 cm. No adrenal mass is identified. There is right renal atrophy with cortical scarring. There is no  evidence of hydronephrosis. Pancreas appears  unremarkable. Aorta is not aneurysmal. There are small inguinal, retroperitoneal and mesenteric lymph nodes. There is no evidence of bowel obstruction. Visualized appendix appears unremarkable. There is a left abdominal stoma. There is parastomal density measuring up to 2.8 x 2.1 cm likely representing a postsurgical hematoma. There is a small amount of free fluid in the abdomen and pelvis. Bladder is mildly distended. There is a surgical drain in the left abdomen. There is third spacing. Degenerative changes are seen in the spine. Compression deformities of T11 and T12 are again noted. Bones are demineralized.     Postsurgical changes with a left abdominal stoma. Parastomal densities may be related to postsurgical hematoma measuring up to 2.8 x 2.1 cm. Small amount of free fluid in the abdomen and pelvis. Collection along the anterior spleen measures approximately 2.9 x 4.3 cm is decreased in size compared to the prior examination. Evaluation is limited by lack of intravenous contrast. Third spacing. Trace free intraperitoneal air is likely related to recent surgery. Moderate left and small right pleural effusions with overlying atelectasis/consolidation. Cortical scarring of the right kidney. Cholelithiasis.     Ct-abdomen-pelvis With    Result Date: 11/9/2020 11/8/2020 11:34 PM HISTORY/REASON FOR EXAM:  Abdominal infection suspected; Has perisplenic abscess with possible connection to bowel TECHNIQUE/EXAM DESCRIPTION: CT of the abdomen and pelvis with contrast. Contrast-enhanced helical scanning was obtained from the diaphragmatic domes through the pubic symphysis following the bolus administration of 100 mL of nonionic contrast (Omnipaque 350) without complication. Low dose optimization technique was utilized for this CT exam including automated exposure control and adjustment of the mA and/or kV according to patient size. COMPARISON: 11/3/2020 CT FINDINGS: The visualized lung bases show diminished small left  pleural fluid that layers dependently. There is similar middle and left lower lobe atelectasis . Similar multichamber cardiac enlargement CT Abdomen: There is a new pigtail catheter in the anterior perisplenic fluid collection. The collection does diminish in size now measuring 58 x 33 from 79 x 40 mm. There is still gas within it and it is in direct contact with the splenic flexure of the colon. A small amount of gas is also seen in the more superficial soft tissues inferior to the pigtail catheter which crosses the ninth intercostal space No new abscess is detected Contrast is present in the small bowel but not yet in the transverse colon. No contrast extravasation is seen. No abnormal bowel dilatation The pancreas, adrenal glands and kidneys enhance normally. Mild hepatosplenomegaly Some low-density in the gallbladder likely indicating cholelithiasis There is no lymphadenopathy or aneurysmal change of the aorta. Compression deformities in the spine are again seen with subacute fractures resulting in anterior wedging at T11 and T12 There is diffuse fat stranding indicating anasarca CT Pelvis: Visualized pelvic structures are unremarkable. Prostate is within normal limits for age. No lymphadenopathy. There is no free air or free fluid.     Interval decrease in size of perisplenic abscess. Pigtail catheter now in place Abscess has likely fistulous communication with the abutting splenic flexure of the colon. Colon is otherwise normal in appearance Mild hepatosplenomegaly Decreasing small left pleural effusion Subacute anterior wedge compression fractures in the caudal thoracic spine    Ct-abdomen-pelvis With    Addendum Date: 11/5/2020    Addendum: A prior noncontrast CT of the abdomen and pelvis from [facility] dated 10/19/2020 was made available for comparison. On the prior study a perisplenic/subcapsular fluid collection was seen however did not contain air at that time and there was no  discrete evidence of communication with the transverse colon. Communication with splenic flexure of the colon and air within the collection are new from the prior study.    Result Date: 11/5/2020  11/3/2020 4:02 PM HISTORY/REASON FOR EXAM:  Abd pain, unspecified; IV contrast only. TECHNIQUE/EXAM DESCRIPTION:   CT scan of the abdomen and pelvis with contrast. Contrast-enhanced helical scanning was obtained from the diaphragmatic domes through the pubic symphysis following the bolus administration of nonionic contrast without complication. 100 mL of Omnipaque 350 nonionic contrast was administered without complication. Low dose optimization technique was utilized for this CT exam including automated exposure control and adjustment of the mA and/or kV according to patient size. COMPARISON: 9/26/2018 FINDINGS: Chest Base: Small left pleural effusion with mild bibasilar atelectasis. Heart is mildly enlarged. Liver:  Diffuse hypoattenuation of the liver suggesting fatty infiltration. Moderately enlarged. Gallbladder: Cholelithiasis. Biliary tract: Nondilated. Pancreas: Normal. Spleen: There is a rim-enhancing perisplenic fluid collection which contains air and which communicates with the splenic flexure of the colon. This most likely represents an abscess and measures about 8.7 x 7.9 cm. The anterior aspect of the spleen is nonenhancing with irregular margins could be related to infection of splenic parenchyma or infarction. Adrenals: Normal. Kidneys and Collecting Systems:  There is mild right renal cortical scarring. No hydronephrosis. No renal mass. Gastrointestinal tract:  No bowel obstruction. The appendix is normal. Peritoneum: No free air or free fluid. Reproductive organs:  Normal. Bladder:  Normal. Vessels:  Normal caliber. Lymph  Nodes:  No lymphadenopathy. Abdominal wall: Within normal limits. Bones:  Age indeterminate possibly subacute to chronic T11 and T12 superior endplate compression fractures.     1.   Peripherally enhancing perisplenic fluid collection which communicates with the splenic flexure of the colon and which contains foci of air is most consistent with an abscess/infected fluid collection. There is nonenhancement of the anterior aspect of the spleen suggesting infection/infarction. Surgical consultation is recommended. 2.  Hepatomegaly and hepatic steatosis. 3.  Cholelithiasis. 4.  Right renal cortical scarring. No hydronephrosis. 5.  Small left pleural effusion. Mild bibasilar atelectasis. 6.  Subacute to chronic appearing T11 and T12 superior endplate compression fractures. These findings were discussed with SVETLANA PAREDES on 11/3/2020 4:49 PM.     Ct-drain-peritoneal    Result Date: 11/4/2020 11/4/2020 5:19 PM HISTORY/REASON FOR EXAM:  Joya Splenic Fluid Collection TECHNIQUE/EXAM DESCRIPTION: LUQ parasplenic abscess drainage with CT guidance. Low dose optimization technique was utilized for this CT exam including automated exposure control and adjustment of the mA and/or kV according to patient size. PROCEDURE: Informed consent was obtained. SEDATION: Moderate sedation was provided. Pulse oximetry and continuous cardiac monitoring by the nurse was performed throughout the exam. Intraservice time was 30 minutes. Localizing CT images were obtained with the patient in supine position. The skin was prepped with Betadine and draped in a sterile fashion. Following local anesthesia with 1% Lidocaine, a 17 G guiding needle was placed and needle path confirmed with CT. An Amplatz guidewire was placed and following serial tract dilatation, a 10 Italian pigtail locking catheter was placed. A specimen was collected and submitted for culture and sensitivity and Gram stain. Total of 6 mL of Brownish fluid was drained. The catheter was secured to the skin and connected to suction bulb drainage. Final CT images were obtained documenting catheter position. The patient tolerated the procedure well with no evidence  of complication. COMPARISON: CT scan abdomen and pelvis 11/3/2020 FINDINGS: The final CT images show satisfactory catheter position within the target collection.     1.  CT guided perisplenic abscess catheter drainage. 2.  The current plan is to obtain a follow-up CT in 5-7 days..    [id number] View    Result Date: 11/20/2020 11/20/2020 2:00 PM HISTORY/REASON FOR EXAM:  Abdominal Pain. TECHNIQUE/EXAM DESCRIPTION AND NUMBER OF VIEWS:  1 view(s) of the abdomen. COMPARISON: None FINDINGS: No evidence for small bowel obstruction. Surgical clips present in the mid abdomen. LEFT mid abdominal stoma noted. Surgical drain present in the LEFT lateral abdomen. Catheter projects of the RIGHT midabdomen, possibly wound VAC. Degenerative change of lumbar spine.     1.  No evidence of bowel obstruction. 2.  Postoperative changes as described.    Dx-chest-portable (1 View)    Result Date: 11/17/2020 11/17/2020 9:19 AM HISTORY/REASON FOR EXAM: Elevated white blood cell count. TECHNIQUE/EXAM DESCRIPTION AND NUMBER OF VIEWS: Single portable view of the chest. COMPARISON: 11/10/2020 FINDINGS: There are bilateral interstitial and airspace infiltrates. There is small bilateral pleural effusion. The heart is enlarged. Right subclavian central line is again seen.     1.  Bilateral airspace and interstitial infiltrates. 2.  Cardiomegaly.    Dx-chest-portable (1 View)    Result Date: 11/10/2020  11/10/2020 12:29 PM HISTORY/REASON FOR EXAM: new central line. TECHNIQUE/EXAM DESCRIPTION AND NUMBER OF VIEWS: Single AP view of the chest. COMPARISON: November 3 FINDINGS: Hardware: ] Tip overlies the cavoatrial junction Lungs: Linear middle lobe and left basilar opacity is seen. Volumes are low Pleura:  No pneumothorax is identified Heart and mediastinum: There is stable cardiac silhouette enlargement.     No pneumothorax identified following right subclavian line placement Worsening atelectasis with consolidation at the bases not excluded  Stable cardiac silhouette enlargement    Dx-chest-portable (1 View)    Result Date: 11/3/2020  11/3/2020 3:41 PM HISTORY/REASON FOR EXAM:  Chest Pain. TECHNIQUE/EXAM DESCRIPTION AND NUMBER OF VIEWS: Single portable view of the chest. COMPARISON: September 10, 2020 FINDINGS: The cardiac size is enlarged. There is interstitial prominence. Lung aeration has improved. No pneumothorax. No obvious pleural effusion.     Slight interval interval improvement in congestive heart failure.    Us-renal    Result Date: 11/11/2020 11/11/2020 6:21 PM HISTORY/REASON FOR EXAM:  Abnormal Labs TECHNIQUE/EXAM DESCRIPTION: Renal ultrasound. COMPARISON:  None FINDINGS: The right kidney measures 9.39 cm. The left kidney measures 9.54 cm. There is no hydronephrosis. There are no abnormal calcifications. The bladder not fully the time of imaging.      Examination was limited due to poor sound penetration related to body habitus. Grossly normal appearance of the kidneys.    Ec-echocardiogram Complete W/ Cont    Result Date: 11/11/2020  Transthoracic Echo Report Echocardiography Laboratory CONCLUSIONS Normal left ventricular chamber size. Moderately reduced left ventricular systolic function. Left ventricular ejection fraction is visually estimated to be 30-40%; variable related to atrial fibrillation. Mild mitral regurgitation. Moderately dilated right ventricle. Reduced right ventricular systolic function. Right heart pressures are normal. No prior study is available for comparison. LV EF:        % FINDINGS Left Ventricle 3 mL of contrast was administered. Contrast was used to enhance visualization of the endocardial border. Existing IV was used. Mild concentric left ventricular hypertrophy. Normal left ventricular chamber size.  Moderately reduced left ventricular systolic function. Left ventricular ejection fraction is visually estimated to be 30-40%. Diastolic function is difficult to assess with atrial fibrillation. Right Ventricle  Moderately dilated right ventricle. Reduced right ventricular systolic function. Right Atrium Enlarged right atrium. Normal inferior vena cava size and inspiratory collapse. Left Atrium Normal left atrial size. Mitral Valve Structurally normal mitral valve. Mild mitral regurgitation. No mitral stenosis. Aortic Valve Tricuspid aortic valve. Structurally normal aortic valve. No stenosis or regurgitation seen. Tricuspid Valve Structurally normal tricuspid valve. Mild tricuspid regurgitation. Right atrial pressure is estimated to be 3 mmHg. Estimated right ventricular systolic pressure  is 25 mmHg. No tricuspid stenosis. Right heart pressures are normal. Pulmonic Valve Structurally normal pulmonic valve without significant stenosis or regurgitation. Pericardium Normal pericardium without effusion. Aorta The aortic root is normal.  Ascending aorta diameter is 3.6 cm. Germain Pacheco M.D. (Electronically Signed) Final Date:     11 November 2020                 12:38 Amended:        11 November 2020 12:40    CT scan 11/24  CT Abdomen:  The liver, adrenal glands, and pancreas are unremarkable.   There are multiple nitrogen-containing calculi in the gallbladder.   Hypodense collection in the anterior spleen has increased in size to approximately 5.8 x 4.6 cm, previously 4.2 x 2.9 cm. There is stranding in the adjacent fat.   The kidneys enhance symmetrically. There is right renal scarring.   Left abdominal colostomy is again noted with small adjacent hematomas in the subcutaneous fat. There is residual contrast in the distal colon. No contrast extravasation or pneumoperitoneum is appreciated.   There are calcifications in the arteries.    12/4/2020  IMPRESSION:   1.  Interval placement of a surgical drain into a splenic fluid collection. The fluid collection has decreased in size with a small amount of residual fluid seen surrounding the percutaneous catheter drain tip.   2.  No evidence of bowel obstruction.   3.  Left  lower quadrant ostomy site.   4.  Bibasilar atelectasis with small bilateral pleural effusions.       Micro:  Results     Procedure Component Value Units Date/Time    CULTURE WOUND W/ GRAM STAIN [[id number]]  (Abnormal)  (Susceptibility) Collected: 11/25/20 1652    Order Status: Completed Specimen: Wound Updated: 11/28/20 1602     Significant Indicator POS     Source WND     Site Joya-splenic drain     Culture Result Growth noted after further incubation, see below for  organism identification.       Gram Stain Result Moderate WBCs.  Rare Gram positive cocci.       Culture Result Enterococcus faecalis  Rare growth      Narrative:      Collected By:[id number] ERLIN SPARROW  Joya-splenic collection.  Collected By:[id number] KERN GERARDO SPARROW    Susceptibility     Enterococcus faecalis (1)     Antibiotic Interpretation Microscan Method Status    Daptomycin Sensitive 4 mcg/mL ISAMAR Final    Ampicillin Sensitive <=2 mcg/mL ISAMAR Final    Gent Synergy Sensitive <=500 mcg/mL ISAMAR Final    Vancomycin Sensitive 1 mcg/mL ISAMAR Final    Penicillin Sensitive 2 mcg/mL ISAMAR Final                         Assessment:  Active Hospital Problems    Diagnosis   • *Spontaneous perforation of colon ([facility]) [K63.1]   • Leucocytosis [D72.829]   • Schizophrenia ([facility]) [F20.9]   • Sepsis ([facility]) [A41.9]   • Elevated digoxin level [R78.89]   • SRUTHI (acute kidney injury) ([facility]) [N17.9]   • Adrenal insufficiency ([facility]) [E27.40]   • LV dysfunction [I51.9]   • Paroxysmal A-fib with RVR ([facility]) [I48.0]   • Type 2 diabetes mellitus, without long-term current use of insulin, with peripheral neuropathy ([facility]) [E11.9]   • Hypothyroidism [E03.9]   • Prolonged QT interval [R94.31]     Hospital Course:   CT scan on 11/3 revealed a peripherally enhancing perisplenic fluid collection measuring 8.7 x 7.9 cm, communicating with the splenic flexure of the colon and contained foci of air consistent with abscess/infected fluid.  Also noted nonenhancing of the anterior aspect of the  spleen suggesting infection/infarction.    Status post surgery on 11/10/2020    Leukocytosis -Decreased     Acute kidney injury- improved   Renal ultrasound-unremarkable  Nephrology following  Avoid nephrotoxins       Perisplenic abscess with possible communication to the bowel  Left upper quadrant abscess  s/p ground-level fall complicated by a subcapsular splenic hematoma  Afebrile  Splenic enhancement on CT, suggesting infection or infarction  -s/p drainage 11/4 and peritoneal fluid Efaecalis, ampicillin sensitive and C albicans  S/p segmental colectomy, colostomy, irrigation and debridement of flank wound and wound VAC placement on 11/10/2020 by Dr. Desouza.  Patient found to have a left upper quadrant abscess inferior to the spleen with a large bowel wall defect.  Also noted to have an abdominal wall abscess.  Op cultures (colon perforation) - E faecalis and Candida albicans.    -CT abdomen pelvis without on 11/17-hypodensity along anterior aspect of the spleen measuring 4.3 x 2.9 cm.  Parastomal density measuring two-point by 2.1 cm likely representing postsurgical hematoma.  This is decreased in size from prior.  Small amount of free fluid in the abdomen pelvis.  -Drain removed on 11/21  -CT 11/24 with enlarging anterior splenic fluid collection  Deemed nonsurical-  -11/25 new drain placed by IR. Culture + Efaecalis  -Wound granulating     Continue vancomycin to treat the Enterococcus (PCN allergic) for now-(prior Efaecalis)-monitor renal function. Stable thus far  Continue ceftriaxone to provide gram-negative coverage.    Continue fluconazole 400 mg daily  for the Candida  CT with near resolution fluid collection-due to residual fluid extend stop date   Stop date 12/11/2020     Rash, improved   Continue Nizoral to face daily for 10 days  Encourage OOB    Diabetes mellitus  Keep BS under 150 to help control current infection  BS         Antibiotic allergies: Daptomycin rash, penicillins rash and hives,  tolerates cephalosporins-discussed allergies and he states that he has developed significant rash and hives with penicillins several times, most recently a year and a half ago- will avoid penicillins

## 2020-12-05 NOTE — PROGRESS NOTES
Hospital Medicine Daily Progress Note    Date of Service  12/5/2020    Chief Complaint  55 y.o. male admitted 11/3/2020 with abdominal pain    Hospital Course    55-year-old male with history of schizophrenia, diabetes, heart failure with ejection fraction 30% and atrial fibrillation presented 11/3 with left upper quadrant pain, initially the patient had perisplenic hematoma after fall however he developed fever and severe pain, CT scan showed perisplenic abscess with possible fistula and perforation, patient surgery for colostomy and for bowel perforation, blood culture on 11/31 showed Streptococcus sepsis,  A new drain was placed on 11/25, drain fluid culture from the abscess showed Streptococcus and yeast Candida, patient was evaluated by infectious disease and he was treated with ceftriaxone, fluconazole and later added vancomycin to treat Enterococcus since patient is PCN allergy stop date will be 12/5/2020.  Repeat CT on 12/4/2020: Decreased to perisplenic fluid collection with small amount of residual fluid, ID recommended to continue with antibiotic until 12/11/2020    Interval Problem Update  -Seen and examined the patient at bedside,   -Denies any acute complaints, CT repeated on 12/4/2020 small amount of residual fluid, ID recommended to continue with antibiotic until 12/11/2020  -Psych eval done rec legal hold and transfer to psych inpatient, will get re eval by Psych since patient has continued medical needs and will likely need LTAC or SNF.  -The patient has a chronic dyspnea on exertion, he is on room air.  -Labs are reviewed, improving on leukocytosis and kidney function.  -The patient is ready for discharge/placement   Consultants/Specialty  General surgeon  Infectious disease    Code Status  Full Code    Disposition  LTAC or SNF once psych clearance  Medically cleared for placement.      Review of Systems  Review of Systems   Constitutional: Negative.    HENT: Negative.    Respiratory: Negative.     Cardiovascular: Negative.    Gastrointestinal: Negative for abdominal pain, diarrhea, heartburn, nausea and vomiting.   Genitourinary: Negative.    Musculoskeletal: Negative.    Skin: Negative.    Neurological: Negative.    Psychiatric/Behavioral: Negative.         Physical Exam  Temp:  [36 °C (96.8 °F)-36.6 °C (97.8 °F)] 36.2 °C (97.2 °F)  Pulse:  [] 99  Resp:  [16-18] 18  BP: (105-114)/(65-73) 108/66  SpO2:  [91 %-95 %] 91 %    Physical Exam  Constitutional:       Appearance: He is obese. He is not ill-appearing.   HENT:      Head: Normocephalic and atraumatic.      Comments: Bilateral facial rash  Eyes:      General: No scleral icterus.  Abdominal:      General: There is no distension.      Palpations: There is no mass.      Tenderness: There is abdominal tenderness. There is no guarding.      Comments: Colostomy and a drain in place  Wound vac in place   Musculoskeletal:         General: No swelling or deformity.      Right lower leg: No edema.      Left lower leg: No edema.   Skin:     General: Skin is warm.      Coloration: Skin is not jaundiced or pale.      Findings: No bruising.   Neurological:      General: No focal deficit present.      Mental Status: He is alert and oriented to person, place, and time. Mental status is at baseline.      Cranial Nerves: No cranial nerve deficit.      Motor: No weakness.         Fluids    Intake/Output Summary (Last 24 hours) at 12/5/2020 1251  Last data filed at 12/5/2020 0717  Gross per 24 hour   Intake 360 ml   Output 5435 ml   Net -5075 ml       Laboratory  Recent Labs     12/03/20  0551 12/04/20  0534 12/05/20  0603   WBC 10.1 9.8 10.5   RBC 3.30* 3.59* 3.69*   HEMOGLOBIN 8.4* 8.9* 9.5*   HEMATOCRIT 28.2* 30.3* 31.7*   MCV 85.5 84.4 85.9   MCH 25.5* 24.8* 25.7*   MCHC 29.8* 29.4* 30.0*   RDW 56.4* 56.2* 58.6*   PLATELETCT 535* 577* 586*   MPV 9.9 9.3 9.4     Recent Labs     12/03/20  0551 12/04/20  0534 12/05/20  0603   SODIUM 134* 137 134*   POTASSIUM 4.2 4.3  4.2   CHLORIDE 94* 95* 95*   CO2 33 34* 31   GLUCOSE 117* 129* 126*   BUN 16 15 17   CREATININE 1.01 0.88 0.96   CALCIUM 8.8 8.8 8.9                   Imaging  CT-ABDOMEN-PELVIS WITH   Final Result      1.  Interval placement of a surgical drain into a splenic fluid collection. The fluid collection has decreased in size with a small amount of residual fluid seen surrounding the percutaneous catheter drain tip.      2.  No evidence of bowel obstruction.      3.  Left lower quadrant ostomy site.      4.  Bibasilar atelectasis with small bilateral pleural effusions.      5.  Gallstones.      6.  Fatty liver.      7.  Anasarca.      8.  Small amount of ascites.      IR-MIDLINE CATHETER INSERTION WO GUIDANCE > AGE 3   Final Result                  Ultrasound-guided midline placement performed by qualified nursing staff    as above.          CT-DRAIN-PERITONEAL   Final Result      Successful splenic abscess drainage tube placement.      Plan: Twice daily flushes with 10 mL of sterile saline. Monitor outputs.         CT-ABDOMEN-PELVIS WITH   Final Result      1.  Intraparenchymal collection in the anterior aspect of the spleen has increased in size from the prior exam.      2.  Small amount of free fluid in the pelvis has increased from the prior exam.      3.  Small bilateral pleural effusions layer posteriorly, left greater than right, slightly decreased from the prior exam. Adjacent airspace disease is likely atelectasis.      4.  Cholelithiasis.      5.  Right renal scarring.      OB-PRFDIOC-7 VIEW   Final Result      1.  No evidence of bowel obstruction.   2.  Postoperative changes as described.      CT-ABDOMEN-PELVIS W/O   Final Result      Postsurgical changes with a left abdominal stoma. Parastomal densities may be related to postsurgical hematoma measuring up to 2.8 x 2.1 cm.      Small amount of free fluid in the abdomen and pelvis.      Collection along the anterior spleen measures approximately 2.9 x 4.3 cm is  decreased in size compared to the prior examination. Evaluation is limited by lack of intravenous contrast.      Third spacing.      Trace free intraperitoneal air is likely related to recent surgery.      Moderate left and small right pleural effusions with overlying atelectasis/consolidation.      Cortical scarring of the right kidney.      Cholelithiasis.         DX-CHEST-PORTABLE (1 VIEW)   Final Result      1.  Bilateral airspace and interstitial infiltrates.      2.  Cardiomegaly.      US-RENAL   Final Result    Examination was limited due to poor sound penetration related to body habitus.   Grossly normal appearance of the kidneys.      EC-ECHOCARDIOGRAM COMPLETE W/ CONT   Final Result      DX-CHEST-PORTABLE (1 VIEW)   Final Result      No pneumothorax identified following right subclavian line placement      Worsening atelectasis with consolidation at the bases not excluded      Stable cardiac silhouette enlargement      CT-ABDOMEN-PELVIS WITH   Final Result      Interval decrease in size of perisplenic abscess. Pigtail catheter now in place      Abscess has likely fistulous communication with the abutting splenic flexure of the colon. Colon is otherwise normal in appearance      Mild hepatosplenomegaly      Decreasing small left pleural effusion      Subacute anterior wedge compression fractures in the caudal thoracic spine      CT-DRAIN-PERITONEAL   Final Result      1.  CT guided perisplenic abscess catheter drainage.   2.  The current plan is to obtain a follow-up CT in 5-7 days..      OUTSIDE IMAGES-CT CHEST   Final Result      OUTSIDE IMAGES-CT ABDOMEN /PELVIS   Final Result      CT-ABDOMEN-PELVIS WITH   Final Result   Addendum 1 of 1   Addendum:   A prior noncontrast CT of the abdomen and pelvis from [facility] dated 10/19/2020 was made available for comparison. On the    prior study a perisplenic/subcapsular fluid collection was seen however    did not contain air at that time  and    there was no discrete evidence of communication with the transverse colon.    Communication with splenic flexure of the colon and air within the    collection are new from the prior study.      Final      DX-CHEST-PORTABLE (1 VIEW)   Final Result      Slight interval interval improvement in congestive heart failure.           Assessment/Plan  * Spontaneous perforation of colon (HCC)- (present on admission)  Assessment & Plan  Admitted with LUQ abscess and perforation at splenic flexure   Segmental colectomy, colostomy, Patti pouch, I & D of abdominal wall abscess  Wound cultures: enterococcus and candida  And blood culture showed strep  Patient was evaluated and followed by ID  To continue ceftriaxone, fluconazole and vancomycin until 12/11/2020  Repeat CT abdomen and pelvis on 12/4 with residual perisplenic fluid  Surgery signed off to follow-up with Dr. Desouza as outpatient    Sepsis ([facility])  Assessment & Plan  Secondary to perforation of colon,  Wound cultures: enterococcus & candida.  Started on Linezolid and Fluconazole. But his QTc is prolonged.  - DCed linezolide and started on vancomycin.  - continue Fluconazole  - DC Zyvox due to prolonged QTc  - continue  vancomycin (11/17)  - continue ceftriaxone 11/17and fluconazole  - Dced Flagyl on 11/21 due to nausea - nausea improved      Leucocytosis  Assessment & Plan  Due to intra-abdominal infection and abscess   continue antibiotics  Labs daily  Improving      Chronic heart failure (HCC)  Assessment & Plan  History of ischemic cardiomyopathy with ejection fraction 30/40%, no acute decompensation during this admission  With right ventricle dysfunction  Continue metoprolol and lisinopril and Aldactone  Continue amiodarone for atrial fibrillation  Patient Bicarb trending up will decrease lasix to 20 mg once dailiy  Weight daily  I and O -1800 adequate diuresis    Paroxysmal A-fib with RVR ([facility])- (present on admission)  Assessment & Plan  Seen by cardiac  electrophysiologist in 2019 and deemed not to be good candidate for anticoagulation due to frequent falls 2/2 orthostatic hypotension  CHADSVASC of 3   He was given digoxin however discontinued due to toxicity  Heart rate is controlled  Continue metoprolol XL 75 mg twice daily and amiodarone 20 mg daily  Continue holding anticoagulation at this time for possible surgery  To follow-up with cardiologist as outpatient    Prolonged QT interval  Assessment & Plan  Patient's QTc on admission 477 increased to 535 with Linezolid, zofran, zoloft, olanzapine.  DCed Linezolid, zofran, olanzapine & zoloft  QTc improved from 535 to 425 to 473  Seen by psychiatry for medication recommendations started on resperidone and modafinil.  - avoid QT prolonging medication.    Hypothyroidism  Assessment & Plan  Obese male   TSH 16.0 and T4 1.14  start on Levothyroxine 112mcg daily.  Repeat thyroid function after 6 to 8 weeks    Schizophrenia ([facility])  Assessment & Plan  History of schizophrenia on olanzapine and sertraline.  Developed hallucination during this hospitalization, also had delayed prolonged QTC  Evaluated by psychiatrist and started with risperidone and modafinil, discontinue Zoloft and olanzapine  Stable at this time continue monitoring and follow-up with psychiatrist.  Patient reported feeling suicidal on 11/30/2020, patient placed on legal hold and psych consult placed    Elevated digoxin level  Assessment & Plan  H/o N/v and visual hallucinations including yellow halos, circles and squares  AFTER STARTING digoxin and 11/14 digoxin level 1.5 & on 11/17 Repeat digoxin level > 5,   & 0.8  Resolved  Keep holding digoxin    SRUTHI (acute kidney injury) ([facility])  Assessment & Plan  Resolved    Type 2 diabetes mellitus, without long-term current use of insulin, with peripheral neuropathy ([facility])- (present on admission)  Assessment & Plan  Patient states blood sugar well controlled at home with metformin.   Last a1c of 7.8 on 11/10/20   -  continue Lantus 10U daily  - Sliding scale insulin with hypoglycemia protocol  - Ensure strict glucose control          VTE prophylaxis: lovenox

## 2020-12-06 LAB
ANION GAP SERPL CALC-SCNC: 7 MMOL/L (ref 7–16)
BASOPHILS # BLD AUTO: 0.8 % (ref 0–1.8)
BASOPHILS # BLD: 0.08 K/UL (ref 0–0.12)
BUN SERPL-MCNC: 17 MG/DL (ref 8–22)
CALCIUM SERPL-MCNC: 8.8 MG/DL (ref 8.5–10.5)
CHLORIDE SERPL-SCNC: 98 MMOL/L (ref 96–112)
CO2 SERPL-SCNC: 29 MMOL/L (ref 20–33)
CREAT SERPL-MCNC: 0.91 MG/DL (ref 0.5–1.4)
EOSINOPHIL # BLD AUTO: 1.08 K/UL (ref 0–0.51)
EOSINOPHIL NFR BLD: 10.7 % (ref 0–6.9)
ERYTHROCYTE [DISTWIDTH] IN BLOOD BY AUTOMATED COUNT: 59.7 FL (ref 35.9–50)
GLUCOSE BLD-MCNC: 167 MG/DL (ref 65–99)
GLUCOSE SERPL-MCNC: 125 MG/DL (ref 65–99)
HCT VFR BLD AUTO: 31.5 % (ref 42–52)
HGB BLD-MCNC: 9.3 G/DL (ref 14–18)
IMM GRANULOCYTES # BLD AUTO: 0.07 K/UL (ref 0–0.11)
IMM GRANULOCYTES NFR BLD AUTO: 0.7 % (ref 0–0.9)
LYMPHOCYTES # BLD AUTO: 2.67 K/UL (ref 1–4.8)
LYMPHOCYTES NFR BLD: 26.5 % (ref 22–41)
MCH RBC QN AUTO: 25.5 PG (ref 27–33)
MCHC RBC AUTO-ENTMCNC: 29.5 G/DL (ref 33.7–35.3)
MCV RBC AUTO: 86.3 FL (ref 81.4–97.8)
MONOCYTES # BLD AUTO: 0.9 K/UL (ref 0–0.85)
MONOCYTES NFR BLD AUTO: 8.9 % (ref 0–13.4)
NEUTROPHILS # BLD AUTO: 5.29 K/UL (ref 1.82–7.42)
NEUTROPHILS NFR BLD: 52.4 % (ref 44–72)
NRBC # BLD AUTO: 0 K/UL
NRBC BLD-RTO: 0 /100 WBC
PLATELET # BLD AUTO: 608 K/UL (ref 164–446)
PMV BLD AUTO: 9.2 FL (ref 9–12.9)
POTASSIUM SERPL-SCNC: 4.2 MMOL/L (ref 3.6–5.5)
RBC # BLD AUTO: 3.65 M/UL (ref 4.7–6.1)
SODIUM SERPL-SCNC: 134 MMOL/L (ref 135–145)
WBC # BLD AUTO: 10.1 K/UL (ref 4.8–10.8)

## 2020-12-06 PROCEDURE — 99232 SBSQ HOSP IP/OBS MODERATE 35: CPT | Performed by: STUDENT IN AN ORGANIZED HEALTH CARE EDUCATION/TRAINING PROGRAM

## 2020-12-06 PROCEDURE — 80048 BASIC METABOLIC PNL TOTAL CA: CPT

## 2020-12-06 PROCEDURE — 700105 HCHG RX REV CODE 258: Performed by: STUDENT IN AN ORGANIZED HEALTH CARE EDUCATION/TRAINING PROGRAM

## 2020-12-06 PROCEDURE — A9270 NON-COVERED ITEM OR SERVICE: HCPCS | Performed by: INTERNAL MEDICINE

## 2020-12-06 PROCEDURE — 700102 HCHG RX REV CODE 250 W/ 637 OVERRIDE(OP): Performed by: INTERNAL MEDICINE

## 2020-12-06 PROCEDURE — 82962 GLUCOSE BLOOD TEST: CPT | Mod: 91

## 2020-12-06 PROCEDURE — 99232 SBSQ HOSP IP/OBS MODERATE 35: CPT | Performed by: INTERNAL MEDICINE

## 2020-12-06 PROCEDURE — 97530 THERAPEUTIC ACTIVITIES: CPT

## 2020-12-06 PROCEDURE — 700111 HCHG RX REV CODE 636 W/ 250 OVERRIDE (IP): Performed by: STUDENT IN AN ORGANIZED HEALTH CARE EDUCATION/TRAINING PROGRAM

## 2020-12-06 PROCEDURE — 770001 HCHG ROOM/CARE - MED/SURG/GYN PRIV*

## 2020-12-06 PROCEDURE — 700111 HCHG RX REV CODE 636 W/ 250 OVERRIDE (IP): Performed by: INTERNAL MEDICINE

## 2020-12-06 PROCEDURE — 700102 HCHG RX REV CODE 250 W/ 637 OVERRIDE(OP): Performed by: STUDENT IN AN ORGANIZED HEALTH CARE EDUCATION/TRAINING PROGRAM

## 2020-12-06 PROCEDURE — A9270 NON-COVERED ITEM OR SERVICE: HCPCS | Performed by: STUDENT IN AN ORGANIZED HEALTH CARE EDUCATION/TRAINING PROGRAM

## 2020-12-06 PROCEDURE — 85025 COMPLETE CBC W/AUTO DIFF WBC: CPT

## 2020-12-06 PROCEDURE — 700102 HCHG RX REV CODE 250 W/ 637 OVERRIDE(OP): Performed by: PSYCHIATRY & NEUROLOGY

## 2020-12-06 PROCEDURE — 97110 THERAPEUTIC EXERCISES: CPT

## 2020-12-06 PROCEDURE — 36415 COLL VENOUS BLD VENIPUNCTURE: CPT

## 2020-12-06 PROCEDURE — A9270 NON-COVERED ITEM OR SERVICE: HCPCS | Performed by: PSYCHIATRY & NEUROLOGY

## 2020-12-06 RX ADMIN — INSULIN GLARGINE 15 UNITS: 100 INJECTION, SOLUTION SUBCUTANEOUS at 06:46

## 2020-12-06 RX ADMIN — LEVOTHYROXINE SODIUM 112 MCG: 112 TABLET ORAL at 04:45

## 2020-12-06 RX ADMIN — SPIRONOLACTONE 25 MG: 50 TABLET ORAL at 04:45

## 2020-12-06 RX ADMIN — CEFTRIAXONE 2 G: 2 INJECTION, POWDER, FOR SOLUTION INTRAMUSCULAR; INTRAVENOUS at 06:42

## 2020-12-06 RX ADMIN — METOPROLOL SUCCINATE 75 MG: 50 TABLET, EXTENDED RELEASE ORAL at 04:45

## 2020-12-06 RX ADMIN — CETIRIZINE HYDROCHLORIDE 10 MG: 10 TABLET, FILM COATED ORAL at 04:45

## 2020-12-06 RX ADMIN — METOPROLOL SUCCINATE 75 MG: 50 TABLET, EXTENDED RELEASE ORAL at 17:31

## 2020-12-06 RX ADMIN — INSULIN HUMAN 3 UNITS: 100 INJECTION, SOLUTION PARENTERAL at 23:51

## 2020-12-06 RX ADMIN — AMIODARONE HYDROCHLORIDE 200 MG: 200 TABLET ORAL at 06:00

## 2020-12-06 RX ADMIN — INSULIN HUMAN 3 UNITS: 100 INJECTION, SOLUTION PARENTERAL at 11:54

## 2020-12-06 RX ADMIN — INSULIN HUMAN 3 UNITS: 100 INJECTION, SOLUTION PARENTERAL at 00:04

## 2020-12-06 RX ADMIN — VANCOMYCIN HYDROCHLORIDE 1000 MG: 500 INJECTION, POWDER, LYOPHILIZED, FOR SOLUTION INTRAVENOUS at 13:09

## 2020-12-06 RX ADMIN — FLUCONAZOLE 400 MG: 200 TABLET ORAL at 06:42

## 2020-12-06 RX ADMIN — RISPERIDONE 1 MG: 1 TABLET ORAL at 17:26

## 2020-12-06 RX ADMIN — LISINOPRIL 20 MG: 20 TABLET ORAL at 04:45

## 2020-12-06 RX ADMIN — RISPERIDONE 0.5 MG: 1 TABLET ORAL at 04:45

## 2020-12-06 RX ADMIN — ACETAMINOPHEN 650 MG: 325 TABLET, FILM COATED ORAL at 00:38

## 2020-12-06 RX ADMIN — MODAFINIL 100 MG: 100 TABLET ORAL at 04:45

## 2020-12-06 RX ADMIN — Medication 400 MG: at 06:42

## 2020-12-06 RX ADMIN — TRAZODONE HYDROCHLORIDE 50 MG: 50 TABLET ORAL at 20:55

## 2020-12-06 RX ADMIN — FUROSEMIDE 20 MG: 10 INJECTION, SOLUTION INTRAMUSCULAR; INTRAVENOUS at 04:45

## 2020-12-06 RX ADMIN — Medication 400 MG: at 17:26

## 2020-12-06 RX ADMIN — KETOCONAZOLE: 20 CREAM TOPICAL at 04:49

## 2020-12-06 RX ADMIN — FAMOTIDINE 20 MG: 20 TABLET, FILM COATED ORAL at 06:42

## 2020-12-06 ASSESSMENT — ENCOUNTER SYMPTOMS
ABDOMINAL PAIN: 1
RESPIRATORY NEGATIVE: 1
NAUSEA: 0
ABDOMINAL PAIN: 0
NEUROLOGICAL NEGATIVE: 1
COUGH: 0
DIARRHEA: 0
PSYCHIATRIC NEGATIVE: 1
CONSTITUTIONAL NEGATIVE: 1
CARDIOVASCULAR NEGATIVE: 1
SHORTNESS OF BREATH: 0
MUSCULOSKELETAL NEGATIVE: 1
HEARTBURN: 0
FEVER: 0
MYALGIAS: 1
CHILLS: 0
VOMITING: 0

## 2020-12-06 ASSESSMENT — COGNITIVE AND FUNCTIONAL STATUS - GENERAL
TURNING FROM BACK TO SIDE WHILE IN FLAT BAD: UNABLE
MOBILITY SCORE: 8
SUGGESTED CMS G CODE MODIFIER MOBILITY: CM
WALKING IN HOSPITAL ROOM: A LOT
STANDING UP FROM CHAIR USING ARMS: A LOT
MOVING FROM LYING ON BACK TO SITTING ON SIDE OF FLAT BED: UNABLE
CLIMB 3 TO 5 STEPS WITH RAILING: TOTAL
MOVING TO AND FROM BED TO CHAIR: UNABLE

## 2020-12-06 ASSESSMENT — PAIN DESCRIPTION - PAIN TYPE
TYPE: ACUTE PAIN

## 2020-12-06 ASSESSMENT — GAIT ASSESSMENTS: GAIT LEVEL OF ASSIST: UNABLE TO PARTICIPATE

## 2020-12-06 NOTE — PROGRESS NOTES
Infectious Disease Progress Note    Author: Cristy Holley M.D. Date & Time of service: 12/6/2020  11:36 AM    Chief Complaint:  Intrabdominal abscess and bacteremia     Interval History:   55 y.o. diabetic male admitted 11/3/2020.+ A. fib, CHD, and severe morbid obesity.  Seen by ID in the past for right foot abscess with cultures positive for MRSA and Proteus.  s/p GLF approximately 1 month ago and was admitted to [facility] with left upper quadrant pain.  CT at the time revealed a subcapsular splenic hematoma. Presented to ED complaining of abdominal pain.  11/9 AF WBC 9.4 Less pain in side but remains tender to touch. Denies SE Zyvox-counseled about sxs serotonin syndrome  11/11 T-max 99.7 WBC 15.4 patient underwent colectomy, colostomy placement and I&D of flank wound with wound VAC placement on 11/10/2020 by Dr. Desouza.  Postop course complicated by hypotension and transferred to the ICU.  On pressors.  Patient complaining of shortness of breath as well as lightheadedness.  Also has postop abdominal pain.  11/12 afebrile WBC 13.3.  Patient weaned off pressors overnight.  Operative cultures growing group D Enterococcus and yeast.  Patient feeling better today.  Improved shortness of breath.  Abdominal pain stable  11/13 afebrile WBC 13.8 patient continues to feel better.  He is inquiring when he can have food by mouth.  11/14 afebrile WBC 9.9.  Patient feeling a bit better today.  His diet has been slowly advanced.  He is however complaining of some sharp abdominal pain when he coughs  11/16  AF, O2 RA, abdominal pain and poor appetite.   11/17 AF, O2 1 L NC , WBC 19.1. Drain output 465 cc yesterday. Ongoing nausea, poor appetite and abdomen is quite tender on palpation.  Will broaden antibiotics.   11/18 AF, O2 1 L NC, WBC 18.1, Drain output- 610 cc. Patient still has abdominal pain and significant nausea.  Examined abdominal wound photos and discussed with wound care team.  He has a large wound but  clean appearing base with granulation tissue.  No obvious infection in the surrounding tissues.  Left side wound with packing in place, ongoing drainage but clearing .    AF, tachycardia and hypertensive , WBC 17.2, drain output-205 cc yesterday. C/o nausea and agree with plan to transition Flagyl to IV to see if this helps.     AF WBC 13.6 feels OK-abd pain controlled. Poor appetite and not eating much. Denies SE abx-nausea resolved   AF WBC 16 no new complaints-no new cough, dyspnea and abd pain the same. Appetite so-so.   AF WBC 13 CT reviewed H and H continues to decrease. Somnolent today-no acute events overnight   AF 11.8 had new drain placed-tolerated well-still poor appetite    AF WBC 11.4 cxs with enterococcus-tired today. No new complaints   AF WBC 9.6 c/o itching face and back. Pain controlled    AF PICC bandage itchy-VAC changed this am-pictures reviewed. No new complaints   AF suicidal ideation and seeing spirit dog noted-psych eval ordered   AF WBC 9.6 has sitter-feeling a little more positive. Rash on face flaking   AF WBC 9.8 rash on face resolving. Still has 1:1 sitter-awaiting placement. Wound care notes and pics reviewed   AF WBC 10.5 CT reviewed-much improved-small amount of residual fluid noted   AF WBC 10.1 working with therapy on transfer bed to chair and back-pleased with CT results    Review of Systems:  Review of Systems   Constitutional: Positive for malaise/fatigue. Negative for chills and fever.   Respiratory: Negative for cough and shortness of breath.    Cardiovascular: Negative for chest pain.   Gastrointestinal: Positive for abdominal pain. Negative for diarrhea, nausea and vomiting.   Musculoskeletal: Positive for joint pain and myalgias.   Skin: Positive for rash. Negative for itching.   All other systems reviewed and are negative.      Hemodynamics:  Temp (24hrs), Av.4 °C (97.6 °F), Min:36.3 °C (97.4 °F), Max:36.7 °C  (98 °F)  Temperature: 36.3 °C (97.4 °F)  Pulse  Av.5  Min: 51  Max: 161   Blood Pressure: 125/77      Physical Exam:  Physical Exam  Vitals signs and nursing note reviewed.   Constitutional:       General: He is not in acute distress.     Appearance: He is obese. He is not ill-appearing, toxic-appearing or diaphoretic.   HENT:      Nose: No rhinorrhea.      Mouth/Throat:      Pharynx: No oropharyngeal exudate.   Eyes:      General: No scleral icterus.     Extraocular Movements: Extraocular movements intact.      Pupils: Pupils are equal, round, and reactive to light.   Neck:      Musculoskeletal: Neck supple. No muscular tenderness.   Cardiovascular:      Rate and Rhythm: Tachycardia present. Rhythm irregular.   Pulmonary:      Effort: Pulmonary effort is normal. No respiratory distress.      Breath sounds: No stridor. No wheezing or rhonchi.   Abdominal:      General: There is no distension.      Palpations: Abdomen is soft.      Tenderness: There is abdominal tenderness. There is no guarding or rebound.      Comments: Wound VAC in place, no sign infection in surrounding tissue-measures 11 X7 X 3 cm  Ostomy     Musculoskeletal:      Right lower leg: Edema present.      Left lower leg: Edema present.   Skin:     General: Skin is warm.      Coloration: Skin is not jaundiced.      Findings: Bruising and rash present.      Comments: Rash on face-not on chest or abd. Macularpapular, flaking   Improved  Unable to visualize back   Neurological:      General: No focal deficit present.   Psychiatric:         Mood and Affect: Mood normal.         Meds:    Current Facility-Administered Medications:   •  fluconazole  •  cefTRIAXone (ROCEPHIN) IV  •  LORazepam  •  HYDROcodone-acetaminophen  •  furosemide  •  insulin glargine  •  vancomycin  •  ketoconazole  •  acetaminophen  •  diphenhydrAMINE  •  metoclopramide  •  risperiDONE  •  risperiDONE  •  magnesium oxide  •  modafinil  •  levothyroxine  •  prochlorperazine  •   metoprolol SR  •  lisinopril  •  amiodarone  •  traZODone  •  Metoprolol Tartrate  •  Respiratory Therapy Consult  •  MD Alert...Vancomycin per Pharmacy  •  spironolactone  •  cetirizine  •  famotidine  •  insulin regular **AND** POC Blood Glucose **AND** NOTIFY MD and PharmD **AND** glucose **AND** dextrose 50%  •  albuterol    Labs:  Recent Labs     12/04/20  0534 12/05/20  0603 12/06/20  0527   WBC 9.8 10.5 10.1   RBC 3.59* 3.69* 3.65*   HEMOGLOBIN 8.9* 9.5* 9.3*   HEMATOCRIT 30.3* 31.7* 31.5*   MCV 84.4 85.9 86.3   MCH 24.8* 25.7* 25.5*   RDW 56.2* 58.6* 59.7*   PLATELETCT 577* 586* 608*   MPV 9.3 9.4 9.2   NEUTSPOLYS  --  52.10 52.40   LYMPHOCYTES  --  28.00 26.50   MONOCYTES  --  9.00 8.90   EOSINOPHILS  --  9.50* 10.70*   BASOPHILS  --  0.70 0.80     Recent Labs     12/04/20  0534 12/05/20  0603 12/06/20  0527   SODIUM 137 134* 134*   POTASSIUM 4.3 4.2 4.2   CHLORIDE 95* 95* 98   CO2 34* 31 29   GLUCOSE 129* 126* 125*   BUN 15 17 17     Recent Labs     12/04/20  0534 12/05/20  0603 12/06/20  0527   CREATININE 0.88 0.96 0.91       Imaging:  Ct-abdomen-pelvis W/o    Result Date: 11/17/2020 11/17/2020 1:51 PM HISTORY/REASON FOR EXAM:  Nausea/vomiting; elevated WBC; R/O abscess. TECHNIQUE/EXAM DESCRIPTION: CT scan of the abdomen and pelvis without contrast. Noncontrast helical scanning was obtained from the diaphragmatic domes through the pubic symphysis. Low dose optimization technique was utilized for this CT exam including automated exposure control and adjustment of the mA and/or kV according to patient size. COMPARISON: 11/8/2020. FINDINGS: There is a moderate left and small right pleural effusion with overlying atelectasis/consolidation. Trace free air is seen in the abdomen. Evaluation of parenchymal and vascular structures is limited by the lack of intravenous contrast. The liver appears  unremarkable. There are gallstones within the gallbladder. There is hypodensity along the anterior aspect of the  spleen measuring approximately 4.3 x 2.9 cm. No adrenal mass is identified. There is right renal atrophy with cortical scarring. There is no  evidence of hydronephrosis. Pancreas appears unremarkable. Aorta is not aneurysmal. There are small inguinal, retroperitoneal and mesenteric lymph nodes. There is no evidence of bowel obstruction. Visualized appendix appears unremarkable. There is a left abdominal stoma. There is parastomal density measuring up to 2.8 x 2.1 cm likely representing a postsurgical hematoma. There is a small amount of free fluid in the abdomen and pelvis. Bladder is mildly distended. There is a surgical drain in the left abdomen. There is third spacing. Degenerative changes are seen in the spine. Compression deformities of T11 and T12 are again noted. Bones are demineralized.     Postsurgical changes with a left abdominal stoma. Parastomal densities may be related to postsurgical hematoma measuring up to 2.8 x 2.1 cm. Small amount of free fluid in the abdomen and pelvis. Collection along the anterior spleen measures approximately 2.9 x 4.3 cm is decreased in size compared to the prior examination. Evaluation is limited by lack of intravenous contrast. Third spacing. Trace free intraperitoneal air is likely related to recent surgery. Moderate left and small right pleural effusions with overlying atelectasis/consolidation. Cortical scarring of the right kidney. Cholelithiasis.     Ct-abdomen-pelvis With    Result Date: 11/9/2020 11/8/2020 11:34 PM HISTORY/REASON FOR EXAM:  Abdominal infection suspected; Has perisplenic abscess with possible connection to bowel TECHNIQUE/EXAM DESCRIPTION: CT of the abdomen and pelvis with contrast. Contrast-enhanced helical scanning was obtained from the diaphragmatic domes through the pubic symphysis following the bolus administration of 100 mL of nonionic contrast (Omnipaque 350) without complication. Low dose optimization technique was utilized for this CT exam  including automated exposure control and adjustment of the mA and/or kV according to patient size. COMPARISON: 11/3/2020 CT FINDINGS: The visualized lung bases show diminished small left pleural fluid that layers dependently. There is similar middle and left lower lobe atelectasis . Similar multichamber cardiac enlargement CT Abdomen: There is a new pigtail catheter in the anterior perisplenic fluid collection. The collection does diminish in size now measuring 58 x 33 from 79 x 40 mm. There is still gas within it and it is in direct contact with the splenic flexure of the colon. A small amount of gas is also seen in the more superficial soft tissues inferior to the pigtail catheter which crosses the ninth intercostal space No new abscess is detected Contrast is present in the small bowel but not yet in the transverse colon. No contrast extravasation is seen. No abnormal bowel dilatation The pancreas, adrenal glands and kidneys enhance normally. Mild hepatosplenomegaly Some low-density in the gallbladder likely indicating cholelithiasis There is no lymphadenopathy or aneurysmal change of the aorta. Compression deformities in the spine are again seen with subacute fractures resulting in anterior wedging at T11 and T12 There is diffuse fat stranding indicating anasarca CT Pelvis: Visualized pelvic structures are unremarkable. Prostate is within normal limits for age. No lymphadenopathy. There is no free air or free fluid.     Interval decrease in size of perisplenic abscess. Pigtail catheter now in place Abscess has likely fistulous communication with the abutting splenic flexure of the colon. Colon is otherwise normal in appearance Mild hepatosplenomegaly Decreasing small left pleural effusion Subacute anterior wedge compression fractures in the caudal thoracic spine    Ct-abdomen-pelvis With    Addendum Date: 11/5/2020    Addendum: A prior noncontrast CT of the abdomen and pelvis from [facility]  dated 10/19/2020 was made available for comparison. On the prior study a perisplenic/subcapsular fluid collection was seen however did not contain air at that time and there was no discrete evidence of communication with the transverse colon. Communication with splenic flexure of the colon and air within the collection are new from the prior study.    Result Date: 11/5/2020  11/3/2020 4:02 PM HISTORY/REASON FOR EXAM:  Abd pain, unspecified; IV contrast only. TECHNIQUE/EXAM DESCRIPTION:   CT scan of the abdomen and pelvis with contrast. Contrast-enhanced helical scanning was obtained from the diaphragmatic domes through the pubic symphysis following the bolus administration of nonionic contrast without complication. 100 mL of Omnipaque 350 nonionic contrast was administered without complication. Low dose optimization technique was utilized for this CT exam including automated exposure control and adjustment of the mA and/or kV according to patient size. COMPARISON: 9/26/2018 FINDINGS: Chest Base: Small left pleural effusion with mild bibasilar atelectasis. Heart is mildly enlarged. Liver:  Diffuse hypoattenuation of the liver suggesting fatty infiltration. Moderately enlarged. Gallbladder: Cholelithiasis. Biliary tract: Nondilated. Pancreas: Normal. Spleen: There is a rim-enhancing perisplenic fluid collection which contains air and which communicates with the splenic flexure of the colon. This most likely represents an abscess and measures about 8.7 x 7.9 cm. The anterior aspect of the spleen is nonenhancing with irregular margins could be related to infection of splenic parenchyma or infarction. Adrenals: Normal. Kidneys and Collecting Systems:  There is mild right renal cortical scarring. No hydronephrosis. No renal mass. Gastrointestinal tract:  No bowel obstruction. The appendix is normal. Peritoneum: No free air or free fluid. Reproductive organs:  Normal. Bladder:  Normal. Vessels:  Normal caliber. Lymph   Nodes:  No lymphadenopathy. Abdominal wall: Within normal limits. Bones:  Age indeterminate possibly subacute to chronic T11 and T12 superior endplate compression fractures.     1.  Peripherally enhancing perisplenic fluid collection which communicates with the splenic flexure of the colon and which contains foci of air is most consistent with an abscess/infected fluid collection. There is nonenhancement of the anterior aspect of the spleen suggesting infection/infarction. Surgical consultation is recommended. 2.  Hepatomegaly and hepatic steatosis. 3.  Cholelithiasis. 4.  Right renal cortical scarring. No hydronephrosis. 5.  Small left pleural effusion. Mild bibasilar atelectasis. 6.  Subacute to chronic appearing T11 and T12 superior endplate compression fractures. These findings were discussed with SVETLANA PAREDES on 11/3/2020 4:49 PM.     Ct-drain-peritoneal    Result Date: 11/4/2020 11/4/2020 5:19 PM HISTORY/REASON FOR EXAM:  Joya Splenic Fluid Collection TECHNIQUE/EXAM DESCRIPTION: LUQ parasplenic abscess drainage with CT guidance. Low dose optimization technique was utilized for this CT exam including automated exposure control and adjustment of the mA and/or kV according to patient size. PROCEDURE: Informed consent was obtained. SEDATION: Moderate sedation was provided. Pulse oximetry and continuous cardiac monitoring by the nurse was performed throughout the exam. Intraservice time was 30 minutes. Localizing CT images were obtained with the patient in supine position. The skin was prepped with Betadine and draped in a sterile fashion. Following local anesthesia with 1% Lidocaine, a 17 G guiding needle was placed and needle path confirmed with CT. An Amplatz guidewire was placed and following serial tract dilatation, a 10 Estonian pigtail locking catheter was placed. A specimen was collected and submitted for culture and sensitivity and Gram stain. Total of 6 mL of Brownish fluid was drained. The catheter  was secured to the skin and connected to suction bulb drainage. Final CT images were obtained documenting catheter position. The patient tolerated the procedure well with no evidence of complication. COMPARISON: CT scan abdomen and pelvis 11/3/2020 FINDINGS: The final CT images show satisfactory catheter position within the target collection.     1.  CT guided perisplenic abscess catheter drainage. 2.  The current plan is to obtain a follow-up CT in 5-7 days..    [id number] View    Result Date: 11/20/2020 11/20/2020 2:00 PM HISTORY/REASON FOR EXAM:  Abdominal Pain. TECHNIQUE/EXAM DESCRIPTION AND NUMBER OF VIEWS:  1 view(s) of the abdomen. COMPARISON: None FINDINGS: No evidence for small bowel obstruction. Surgical clips present in the mid abdomen. LEFT mid abdominal stoma noted. Surgical drain present in the LEFT lateral abdomen. Catheter projects of the RIGHT midabdomen, possibly wound VAC. Degenerative change of lumbar spine.     1.  No evidence of bowel obstruction. 2.  Postoperative changes as described.    Dx-chest-portable (1 View)    Result Date: 11/17/2020 11/17/2020 9:19 AM HISTORY/REASON FOR EXAM: Elevated white blood cell count. TECHNIQUE/EXAM DESCRIPTION AND NUMBER OF VIEWS: Single portable view of the chest. COMPARISON: 11/10/2020 FINDINGS: There are bilateral interstitial and airspace infiltrates. There is small bilateral pleural effusion. The heart is enlarged. Right subclavian central line is again seen.     1.  Bilateral airspace and interstitial infiltrates. 2.  Cardiomegaly.    Dx-chest-portable (1 View)    Result Date: 11/10/2020  11/10/2020 12:29 PM HISTORY/REASON FOR EXAM: new central line. TECHNIQUE/EXAM DESCRIPTION AND NUMBER OF VIEWS: Single AP view of the chest. COMPARISON: November 3 FINDINGS: Hardware: ] Tip overlies the cavoatrial junction Lungs: Linear middle lobe and left basilar opacity is seen. Volumes are low Pleura:  No pneumothorax is identified Heart and mediastinum: There is  stable cardiac silhouette enlargement.     No pneumothorax identified following right subclavian line placement Worsening atelectasis with consolidation at the bases not excluded Stable cardiac silhouette enlargement    Dx-chest-portable (1 View)    Result Date: 11/3/2020  11/3/2020 3:41 PM HISTORY/REASON FOR EXAM:  Chest Pain. TECHNIQUE/EXAM DESCRIPTION AND NUMBER OF VIEWS: Single portable view of the chest. COMPARISON: September 10, 2020 FINDINGS: The cardiac size is enlarged. There is interstitial prominence. Lung aeration has improved. No pneumothorax. No obvious pleural effusion.     Slight interval interval improvement in congestive heart failure.    Us-renal    Result Date: 11/11/2020 11/11/2020 6:21 PM HISTORY/REASON FOR EXAM:  Abnormal Labs TECHNIQUE/EXAM DESCRIPTION: Renal ultrasound. COMPARISON:  None FINDINGS: The right kidney measures 9.39 cm. The left kidney measures 9.54 cm. There is no hydronephrosis. There are no abnormal calcifications. The bladder not fully the time of imaging.      Examination was limited due to poor sound penetration related to body habitus. Grossly normal appearance of the kidneys.    Ec-echocardiogram Complete W/ Cont    Result Date: 11/11/2020  Transthoracic Echo Report Echocardiography Laboratory CONCLUSIONS Normal left ventricular chamber size. Moderately reduced left ventricular systolic function. Left ventricular ejection fraction is visually estimated to be 30-40%; variable related to atrial fibrillation. Mild mitral regurgitation. Moderately dilated right ventricle. Reduced right ventricular systolic function. Right heart pressures are normal. No prior study is available for comparison. LV EF:        % FINDINGS Left Ventricle 3 mL of contrast was administered. Contrast was used to enhance visualization of the endocardial border. Existing IV was used. Mild concentric left ventricular hypertrophy. Normal left ventricular chamber size.  Moderately reduced left ventricular  systolic function. Left ventricular ejection fraction is visually estimated to be 30-40%. Diastolic function is difficult to assess with atrial fibrillation. Right Ventricle Moderately dilated right ventricle. Reduced right ventricular systolic function. Right Atrium Enlarged right atrium. Normal inferior vena cava size and inspiratory collapse. Left Atrium Normal left atrial size. Mitral Valve Structurally normal mitral valve. Mild mitral regurgitation. No mitral stenosis. Aortic Valve Tricuspid aortic valve. Structurally normal aortic valve. No stenosis or regurgitation seen. Tricuspid Valve Structurally normal tricuspid valve. Mild tricuspid regurgitation. Right atrial pressure is estimated to be 3 mmHg. Estimated right ventricular systolic pressure  is 25 mmHg. No tricuspid stenosis. Right heart pressures are normal. Pulmonic Valve Structurally normal pulmonic valve without significant stenosis or regurgitation. Pericardium Normal pericardium without effusion. Aorta The aortic root is normal.  Ascending aorta diameter is 3.6 cm. Germain Pacheco M.D. (Electronically Signed) Final Date:     11 November 2020                 12:38 Amended:        11 November 2020 12:40    CT scan 11/24  CT Abdomen:  The liver, adrenal glands, and pancreas are unremarkable.   There are multiple nitrogen-containing calculi in the gallbladder.   Hypodense collection in the anterior spleen has increased in size to approximately 5.8 x 4.6 cm, previously 4.2 x 2.9 cm. There is stranding in the adjacent fat.   The kidneys enhance symmetrically. There is right renal scarring.   Left abdominal colostomy is again noted with small adjacent hematomas in the subcutaneous fat. There is residual contrast in the distal colon. No contrast extravasation or pneumoperitoneum is appreciated.   There are calcifications in the arteries.    12/4/2020  IMPRESSION:   1.  Interval placement of a surgical drain into a splenic fluid collection. The fluid  collection has decreased in size with a small amount of residual fluid seen surrounding the percutaneous catheter drain tip.   2.  No evidence of bowel obstruction.   3.  Left lower quadrant ostomy site.   4.  Bibasilar atelectasis with small bilateral pleural effusions.       Micro:  Results     ** No results found for the last 168 hours. **          Assessment:  Active Hospital Problems    Diagnosis   • *Spontaneous perforation of colon (HCC) [K63.1]   • Leucocytosis [D72.829]   • Schizophrenia (HCC) [F20.9]   • Sepsis (HCC) [A41.9]   • Elevated digoxin level [R78.89]   • SRUTHI (acute kidney injury) (HCC) [N17.9]   • Adrenal insufficiency (HCC) [E27.40]   • LV dysfunction [I51.9]   • Paroxysmal A-fib with RVR (HCC) [I48.0]   • Type 2 diabetes mellitus, without long-term current use of insulin, with peripheral neuropathy (HCC) [E11.9]   • Hypothyroidism [E03.9]   • Prolonged QT interval [R94.31]     Hospital Course:   CT scan on 11/3 revealed a peripherally enhancing perisplenic fluid collection measuring 8.7 x 7.9 cm, communicating with the splenic flexure of the colon and contained foci of air consistent with abscess/infected fluid.  Also noted nonenhancing of the anterior aspect of the spleen suggesting infection/infarction.    Status post surgery on 11/10/2020    Leukocytosis -Decreased     Acute kidney injury- improved   Renal ultrasound-unremarkable  Nephrology following  Avoid nephrotoxins       Perisplenic abscess with possible communication to the bowel  Left upper quadrant abscess  s/p ground-level fall complicated by a subcapsular splenic hematoma  Afebrile  Splenic enhancement on CT, suggesting infection or infarction  -s/p drainage 11/4 and peritoneal fluid Efaecalis, ampicillin sensitive and C albicans  S/p segmental colectomy, colostomy, irrigation and debridement of flank wound and wound VAC placement on 11/10/2020 by Dr. Desouza.  Patient found to have a left upper quadrant abscess inferior to the  spleen with a large bowel wall defect.  Also noted to have an abdominal wall abscess.  Op cultures (colon perforation) - E faecalis and Candida albicans.    -CT abdomen pelvis without on 11/17-hypodensity along anterior aspect of the spleen measuring 4.3 x 2.9 cm.  Parastomal density measuring two-point by 2.1 cm likely representing postsurgical hematoma.  This is decreased in size from prior.  Small amount of free fluid in the abdomen pelvis.  -Drain removed on 11/21  -CT 11/24 with enlarging anterior splenic fluid collection  Deemed nonsurical-  -11/25 new drain placed by IR. Culture + Efaecalis  -Wound granulating     Continue vancomycin to treat the Enterococcus (PCN allergic) for now-(prior Efaecalis)-monitor renal function. Stable thus far  Continue ceftriaxone to provide gram-negative coverage.    Continue fluconazole 400 mg daily  for the Candida  CT with near resolution fluid collection-due to residual fluid extend stop date   Stop date 12/11/2020     Rash, improved   Continue Nizoral to face daily for 10 days  Encourage OOB    Diabetes mellitus  Keep BS under 150 to help control current infection          Antibiotic allergies: Daptomycin rash, penicillins rash and hives, tolerates cephalosporins-discussed allergies and he states that he has developed significant rash and hives with penicillins several times, most recently a year and a half ago- will avoid penicillins     Will sign off-please reconsult if needed

## 2020-12-06 NOTE — THERAPY
"Physical Therapy   Daily Treatment     Patient Name: Sebastián Castro  Age:  55 y.o., Sex:  male  Medical Record #: [id number]  Today's Date: 12/6/2020     Precautions: Fall Risk(wound vac, ostomy, BETTE drain)    Assessment    Patient was very motivated to participate with therapy this session. He used bed features to move from supine to sitting EOB at supervision level; demonstrated fair balance sitting EOB. Patient required FWW, VC, and mod A physical assist x2 for lift off from bed and was able to stand and march at EOB with min A for safety. Educated patient on seated LE exercises; patient demonstrated appropriately and was agreeable to performing them for HEP. Patient transferred bed>chair w mod Ax2 and FWW but required max Ax2 for chair>bed due to low surface and B LE weakness. Patient was left EOB with CNA end of session. Some time later patient requested to return to supine; CNA requested assistance from this therapist however patient used trapeze to move seated EOB>supine and required only mod A for LE positioning. Recommend patient mobilize to EOB and stand w RN staff assist (2 people) 3x a day. Recommend post acute placement for additional PT services due to current functional mobility. Will continue to follow to address bed mobility, transfers, and ambulation.     Plan    Continue current treatment plan.    DC Equipment Recommendations: Unable to determine at this time, Front-Wheel Walker  Discharge Recommendations: Recommend post-acute placement for additional physical therapy services prior to discharge home      Subjective    \"I don't want to just lie there and waste away.\"     Objective       12/06/20 1052   Precautions   Precautions Fall Risk  (wound vac, ostomy, BETTE drain)   Vitals   O2 Delivery Device None - Room Air   Pain 0 - 10 Group   Therapist Pain Assessment   (no pain reported during session)   Cognition    Cognition / Consciousness WDL   Level of Consciousness Alert   Comments pleasant, " cooperative, very motivated to mobilize.   Sitting Lower Body Exercises   Sitting Lower Body Exercises Yes   Long Arc Quad 1 set of 10   Marching 1 set of 10   Standing Lower Body Exercises   Standing Lower Body Exercises Yes   Comments marching w FWW 1x10   Balance   Sitting Balance (Static) Fair   Sitting Balance (Dynamic) Fair -   Standing Balance (Static) Fair -   Standing Balance (Dynamic) Fair -   Weight Shift Sitting Fair   Weight Shift Standing Fair   Skilled Intervention Compensatory Strategies;Verbal Cuing   Comments w FWW, VC for B LE closer together   Gait Analysis   Gait Level Of Assist Unable to Participate   Comments transfer chair<>bed only but able to march in place w FWW    Bed Mobility    Supine to Sit Supervised   Sit to Supine Moderate Assist   Scooting   (not seen in session)   Skilled Intervention Sequencing;Verbal Cuing   Comments increased time and bed features used for supine>sit. Sit>supine required assist for LE positioning; used trapeze for UE   Functional Mobility   Sit to Stand Moderate Assist  (x2 from bed for lift off; max A x2 from xavier chair)   Bed, Chair, Wheelchair Transfer Minimal Assist   Transfer Method Stand Step   Skilled Intervention Compensatory Strategies;Sequencing;Verbal Cuing   Comments w FWW, min A for steps to/from chair; physical assist for lift off from bed/chair   How much difficulty does the patient currently have...   Turning over in bed (including adjusting bedclothes, sheets and blankets)? 1   Sitting down on and standing up from a chair with arms (e.g., wheelchair, bedside commode, etc.) 1   Moving from lying on back to sitting on the side of the bed? 1   How much help from another person does the patient currently need...   Moving to and from a bed to a chair (including a wheelchair)? 2   Need to walk in a hospital room? 2   Climbing 3-5 steps with a railing? 1   6 clicks Mobility Score 8   Activity Tolerance   Sitting in Chair 3 min   Sitting Edge of Bed pt  seated EOB end of session    Standing 2 min total   Comments limited by weakness, therapist deferred prolonged sit in chair d/t assist required to stand   Patient / Family Goals    Patient / Family Goal #1 get stronger, walk again   Goal #1 Outcome Goal not met   Short Term Goals    Short Term Goal # 1 Pt to move supine to/from eob, no rails, w/ spv in 6 visits to improve fxl indep   Goal Outcome # 1 goal not met  (supervision w bed features)   Short Term Goal # 2 Pt to move sit to/from stand w/ spv in 6 visits w/ spv in 6 visits to improve fxl indep   Goal Outcome # 2 Goal not met  (mod/max A)   Short Term Goal # 3 Pt to ambulate 150 ft w/ spv in 6 visits to improve fxl indep   Goal Outcome # 3 Goal not met  (NT this session)   Education Group   Education Provided Role of Physical Therapist;Exercises - Seated;Exercises - Standing   Role of Physical Therapist Patient Response Patient;Eager;Explanation;Verbal Demonstration   Exercises - Seated Patient Response Patient;Acceptance;Explanation;Action Demonstration;Verbal Demonstration   Exercise - Standing Patient Response Patient;Acceptance;Explanation;Verbal Demonstration;Action Demonstration   Additional Comments education re: goal setting; benefits of mobilizing w RN staff; pt agreeable and motivated   Anticipated Discharge Equipment and Recommendations   DC Equipment Recommendations Unable to determine at this time;Front-Wheel Walker   Discharge Recommendations Recommend post-acute placement for additional physical therapy services prior to discharge home   Interdisciplinary Plan of Care Collaboration   IDT Collaboration with  Nursing;Certified Nursing Assistant   Patient Position at End of Therapy Edge of Bed;Call Light within Reach;Tray Table within Reach;Phone within Reach;Other (Comments)  ( CNA at bedside)   Collaboration Comments RN aware of visit, response   Session Information   Date / Session Number  12/6- 6(1/3, 12/12)   Priority 3

## 2020-12-06 NOTE — PROGRESS NOTES
Assumed care of patient at 0645. Assessment complete.  AA&Ox4. Denies CP/SOB.  Reporting 3/10 pain. Pain managed per MAR. Educated regarding non pharmacological modalities for pain management.   abd wound vac in place, appliance checked. L flank IR drain.   Tolerating diet. Denies N/V.  Zabala in place, draining to gravity. Zabala care provided.   Ostomy with high output.  Pt 2 person stand pivot. Needs encouragement. Q2 turns in place.   Tele sitter in place for safety. Hourly rounding  in place.  All needs met at this time. Call light within reach. Pt calls appropriately. Will continue to monitor.

## 2020-12-06 NOTE — PROGRESS NOTES
Hospital Medicine Daily Progress Note    Date of Service  12/6/2020    Chief Complaint  55 y.o. male admitted 11/3/2020 with abdominal pain    Hospital Course    55-year-old male with history of schizophrenia, diabetes, heart failure with ejection fraction 30% and atrial fibrillation presented 11/3 with left upper quadrant pain, initially the patient had perisplenic hematoma after fall however he developed fever and severe pain, CT scan showed perisplenic abscess with possible fistula and perforation, patient surgery for colostomy and for bowel perforation, blood culture on 11/31 showed Streptococcus sepsis,  A new drain was placed on 11/25, drain fluid culture from the abscess showed Streptococcus and yeast Candida, patient was evaluated by infectious disease and he was treated with ceftriaxone, fluconazole and later added vancomycin to treat Enterococcus since patient is PCN allergy. Repeat CT on 12/4/2020: Decreased to perisplenic fluid collection with small amount of residual fluid, ID recommended to continue with antibiotic until 12/11/2020    Interval Problem Update  -Seen and examined the patient at bedside, wound vac draining dark fluid, reports pain to Lower abdomen, denies any fever, chills, [place], anxiety  - CT repeated on 12/4/2020 small amount of residual fluid, ID recommended to continue with antibiotic until 12/11/2020  -Psych eval done rec dc legal hold    - patient has continued medical needs and will likely need LTAC or SNF.  -The patient has a chronic dyspnea on exertion, he is on room air.  -Labs are reviewed, improving on leukocytosis and kidney function.  -The patient is ready for discharge/placement   Consultants/Specialty  General surgeon  Infectious disease    Code Status  Full Code    Disposition  LTAC or SNF once psych clearance  Medically cleared for placement.      Review of Systems  Review of Systems   Constitutional: Negative.    HENT: Negative.    Respiratory: Negative.     Cardiovascular: Negative.    Gastrointestinal: Negative for abdominal pain, diarrhea, heartburn, nausea and vomiting.   Genitourinary: Negative.    Musculoskeletal: Negative.    Skin: Negative.    Neurological: Negative.    Psychiatric/Behavioral: Negative.         Physical Exam  Temp:  [36.3 °C (97.4 °F)-36.7 °C (98 °F)] 36.3 °C (97.4 °F)  Pulse:  [] 100  Resp:  [18-20] 20  BP: ()/(61-77) 125/77  SpO2:  [90 %-94 %] 94 %    Physical Exam  Constitutional:       Appearance: He is obese. He is not ill-appearing.   HENT:      Head: Normocephalic and atraumatic.      Comments: Bilateral facial rash  Eyes:      General: No scleral icterus.  Abdominal:      General: There is no distension.      Palpations: There is no mass.      Tenderness: There is abdominal tenderness. There is no guarding.      Comments: Colostomy and a drain in place  Wound vac in place   Musculoskeletal:         General: No swelling or deformity.      Right lower leg: No edema.      Left lower leg: No edema.   Skin:     General: Skin is warm.      Coloration: Skin is not jaundiced or pale.      Findings: No bruising.   Neurological:      General: No focal deficit present.      Mental Status: He is alert and oriented to person, place, and time. Mental status is at baseline.      Cranial Nerves: No cranial nerve deficit.      Motor: No weakness.         Fluids    Intake/Output Summary (Last 24 hours) at 12/6/2020 1235  Last data filed at 12/6/2020 0400  Gross per 24 hour   Intake --   Output 1950 ml   Net -1950 ml       Laboratory  Recent Labs     12/04/20  0534 12/05/20  0603 12/06/20  0527   WBC 9.8 10.5 10.1   RBC 3.59* 3.69* 3.65*   HEMOGLOBIN 8.9* 9.5* 9.3*   HEMATOCRIT 30.3* 31.7* 31.5*   MCV 84.4 85.9 86.3   MCH 24.8* 25.7* 25.5*   MCHC 29.4* 30.0* 29.5*   RDW 56.2* 58.6* 59.7*   PLATELETCT 577* 586* 608*   MPV 9.3 9.4 9.2     Recent Labs     12/04/20  0534 12/05/20  0603 12/06/20  0527   SODIUM 137 134* 134*   POTASSIUM 4.3 4.2 4.2    CHLORIDE 95* 95* 98   CO2 34* 31 29   GLUCOSE 129* 126* 125*   BUN 15 17 17   CREATININE 0.88 0.96 0.91   CALCIUM 8.8 8.9 8.8                   Imaging  CT-ABDOMEN-PELVIS WITH   Final Result      1.  Interval placement of a surgical drain into a splenic fluid collection. The fluid collection has decreased in size with a small amount of residual fluid seen surrounding the percutaneous catheter drain tip.      2.  No evidence of bowel obstruction.      3.  Left lower quadrant ostomy site.      4.  Bibasilar atelectasis with small bilateral pleural effusions.      5.  Gallstones.      6.  Fatty liver.      7.  Anasarca.      8.  Small amount of ascites.      IR-MIDLINE CATHETER INSERTION WO GUIDANCE > AGE 3   Final Result                  Ultrasound-guided midline placement performed by qualified nursing staff    as above.          CT-DRAIN-PERITONEAL   Final Result      Successful splenic abscess drainage tube placement.      Plan: Twice daily flushes with 10 mL of sterile saline. Monitor outputs.         CT-ABDOMEN-PELVIS WITH   Final Result      1.  Intraparenchymal collection in the anterior aspect of the spleen has increased in size from the prior exam.      2.  Small amount of free fluid in the pelvis has increased from the prior exam.      3.  Small bilateral pleural effusions layer posteriorly, left greater than right, slightly decreased from the prior exam. Adjacent airspace disease is likely atelectasis.      4.  Cholelithiasis.      5.  Right renal scarring.      XT-NBEQLFV-3 VIEW   Final Result      1.  No evidence of bowel obstruction.   2.  Postoperative changes as described.      CT-ABDOMEN-PELVIS W/O   Final Result      Postsurgical changes with a left abdominal stoma. Parastomal densities may be related to postsurgical hematoma measuring up to 2.8 x 2.1 cm.      Small amount of free fluid in the abdomen and pelvis.      Collection along the anterior spleen measures approximately 2.9 x 4.3 cm is  decreased in size compared to the prior examination. Evaluation is limited by lack of intravenous contrast.      Third spacing.      Trace free intraperitoneal air is likely related to recent surgery.      Moderate left and small right pleural effusions with overlying atelectasis/consolidation.      Cortical scarring of the right kidney.      Cholelithiasis.         DX-CHEST-PORTABLE (1 VIEW)   Final Result      1.  Bilateral airspace and interstitial infiltrates.      2.  Cardiomegaly.      US-RENAL   Final Result    Examination was limited due to poor sound penetration related to body habitus.   Grossly normal appearance of the kidneys.      EC-ECHOCARDIOGRAM COMPLETE W/ CONT   Final Result      DX-CHEST-PORTABLE (1 VIEW)   Final Result      No pneumothorax identified following right subclavian line placement      Worsening atelectasis with consolidation at the bases not excluded      Stable cardiac silhouette enlargement      CT-ABDOMEN-PELVIS WITH   Final Result      Interval decrease in size of perisplenic abscess. Pigtail catheter now in place      Abscess has likely fistulous communication with the abutting splenic flexure of the colon. Colon is otherwise normal in appearance      Mild hepatosplenomegaly      Decreasing small left pleural effusion      Subacute anterior wedge compression fractures in the caudal thoracic spine      CT-DRAIN-PERITONEAL   Final Result      1.  CT guided perisplenic abscess catheter drainage.   2.  The current plan is to obtain a follow-up CT in 5-7 days..      OUTSIDE IMAGES-CT CHEST   Final Result      OUTSIDE IMAGES-CT ABDOMEN /PELVIS   Final Result      CT-ABDOMEN-PELVIS WITH   Final Result   Addendum 1 of 1   Addendum:   A prior noncontrast CT of the abdomen and pelvis from [facility] dated 10/19/2020 was made available for comparison. On the    prior study a perisplenic/subcapsular fluid collection was seen however    did not contain air at that time  and    there was no discrete evidence of communication with the transverse colon.    Communication with splenic flexure of the colon and air within the    collection are new from the prior study.      Final      DX-CHEST-PORTABLE (1 VIEW)   Final Result      Slight interval interval improvement in congestive heart failure.           Assessment/Plan  * Spontaneous perforation of colon (HCC)- (present on admission)  Assessment & Plan  Admitted with LUQ abscess and perforation at splenic flexure   Segmental colectomy, colostomy, Patti pouch, I & D of abdominal wall abscess  Wound cultures: enterococcus and candida  And blood culture showed strep  Patient was evaluated and followed by ID  To continue ceftriaxone, fluconazole and vancomycin until 12/11/2020  Repeat CT abdomen and pelvis on 12/4 with residual perisplenic fluid  Surgery signed off to follow-up with Dr. Desouza as outpatient    Sepsis ([facility])  Assessment & Plan  Secondary to perforation of colon,  Wound cultures: enterococcus & candida.  Started on Linezolid and Fluconazole. But his QTc is prolonged.  - DCed linezolide and started on vancomycin.  - continue Fluconazole  - DC Zyvox due to prolonged QTc  - continue  vancomycin (11/17)  - continue ceftriaxone 11/17and fluconazole  - Dced Flagyl on 11/21 due to nausea - nausea improved      Leucocytosis  Assessment & Plan  Due to intra-abdominal infection and abscess   continue antibiotics  Labs daily  Improving      Chronic heart failure (HCC)  Assessment & Plan  History of ischemic cardiomyopathy with ejection fraction 30/40%, no acute decompensation during this admission  With right ventricle dysfunction  Continue metoprolol and lisinopril and Aldactone  Continue amiodarone for atrial fibrillation  Patient Bicarb trending up will decrease lasix to 20 mg once dailiy  Weight daily  I and O -1800 adequate diuresis    Paroxysmal A-fib with RVR ([facility])- (present on admission)  Assessment & Plan  Seen by cardiac  electrophysiologist in 2019 and deemed not to be good candidate for anticoagulation due to frequent falls 2/2 orthostatic hypotension  CHADSVASC of 3   He was given digoxin however discontinued due to toxicity  Heart rate is controlled  Continue metoprolol XL 75 mg twice daily and amiodarone 20 mg daily  Continue holding anticoagulation at this time for possible surgery  To follow-up with cardiologist as outpatient    Prolonged QT interval  Assessment & Plan  Patient's QTc on admission 477 increased to 535 with Linezolid, zofran, zoloft, olanzapine.  DCed Linezolid, zofran, olanzapine & zoloft  QTc improved from 535 to 425 to 473  Seen by psychiatry for medication recommendations started on resperidone and modafinil.  - avoid QT prolonging medication.    Hypothyroidism  Assessment & Plan  Obese male   TSH 16.0 and T4 1.14  start on Levothyroxine 112mcg daily.  Repeat thyroid function after 6 to 8 weeks    Schizophrenia ([facility])  Assessment & Plan  History of schizophrenia on olanzapine and sertraline.  Developed hallucination during this hospitalization, also had delayed prolonged QTC  Evaluated by psychiatrist and started with risperidone and modafinil, discontinue Zoloft and olanzapine  Stable at this time continue monitoring and follow-up with psychiatrist.  Patient reported feeling suicidal on 11/30/2020, patient placed on legal hold and psych consult placed    Elevated digoxin level  Assessment & Plan  H/o N/v and visual hallucinations including yellow halos, circles and squares  AFTER STARTING digoxin and 11/14 digoxin level 1.5 & on 11/17 Repeat digoxin level > 5,   & 0.8  Resolved  Keep holding digoxin    SRUTHI (acute kidney injury) ([facility])  Assessment & Plan  Resolved    Type 2 diabetes mellitus, without long-term current use of insulin, with peripheral neuropathy ([facility])- (present on admission)  Assessment & Plan  Patient states blood sugar well controlled at home with metformin.   Last a1c of 7.8 on 11/10/20   -  continue Lantus 10U daily  - Sliding scale insulin with hypoglycemia protocol  - Ensure strict glucose control          VTE prophylaxis: lovenox

## 2020-12-06 NOTE — PROGRESS NOTES
"Assumed care at 1845. Pt resting in bed. A&ox 4  Withdrawn. States he \"doesn't feel good\"  abd wound vac in place. Ostomy with high output  Tolerating diet  Left IR drain in place  Iv abx continued  q2 turn in place  O2 on RA  Pain controlled  Tele sitter in place. Pt denying suicidal thought at this time  Call light within reach. Hourly rounding in place  "

## 2020-12-06 NOTE — PROGRESS NOTES
"Pharmacy Kinetics 55 y.o. male on vancomycin day # 19 2020    Currently on Vancomycin 1000 mg iv q24hr  Provider specified end date: 20    Indication for Treatment: IAI     Pertinent history per medical record: Admitted on 11/3/2020 for  concern of abdominal pain after surgical intervention. Concern for subsequent colonic perforation and possible abscess. ID and surgery consulted.     Other antibiotics: Ceftriaxone 2 grams q24H, fluconazole 400 mg q24H     Allergies: Daptomycin, Pcn [penicillins], Unasyn [ampicillin-sulbactam sodium], and Vancomycin   List concerns for renal function: BMI 58.16 kg/m2; albumin <3; abnormal LFTs     Pertinent cultures to date:   20 - wound (vj-splenic drain): E faecalis  11/10/20 - colon perforation: E faecalis; C albicans     MRSA nares swab if pneumonia is a concern (ordered/positive/negative/n-a): N/A    Recent Labs     20  0534 20  0603 20  0527   WBC 9.8 10.5 10.1   NEUTSPOLYS  --  52.10 52.40     Recent Labs     20  0534 20  0603 20  0527   BUN 15 17 17   CREATININE 0.88 0.96 0.91     Recent Labs     20  1217   VANCOTROUGH 14.6     Intake/Output Summary (Last 24 hours) at 2020 1135  Last data filed at 2020 0400  Gross per 24 hour   Intake --   Output 1950 ml   Net -1950 ml      /77   Pulse 100   Temp 36.3 °C (97.4 °F) (Temporal)   Resp 20   Ht 1.829 m (6')   Wt (!) 194.5 kg (428 lb 12.7 oz)   SpO2 94%  Temp (24hrs), Av.4 °C (97.6 °F), Min:36.3 °C (97.4 °F), Max:36.7 °C (98 °F)    A/P   1. Vancomycin dose change: Continue vancomycin 1000 mg IV Q24h (due @ 1230)  2. Next vancomycin level: 3-4 days  3. Goal trough: 10-15 mcg/mL  4. Comments: Tmax 98 without leukocytosis. Vital signs and renal indices stable. No s/s of vancomycin toxicity present; UOP ~0.84 mL/kg/hr. Repeat CT A/P dictates \"fluid collection has decreased in size with a small amount of residual fluid seen surrounding the " "percutaneous catheter drain tip.\" Therapy extended to 12/11/20 per ID. Last vanco trough therapeutic at 14.6 mcg/mL. Continue vancomycin 1000 mg IV Q24h as outlined above and consider checking a repeat level in 3-4 days. Pharmacy to monitor and adjust as needed.     Sheron Matias, PharmD, BCOP      "

## 2020-12-07 LAB
GLUCOSE BLD-MCNC: 116 MG/DL (ref 65–99)
GLUCOSE BLD-MCNC: 122 MG/DL (ref 65–99)
GLUCOSE BLD-MCNC: 137 MG/DL (ref 65–99)
GLUCOSE BLD-MCNC: 159 MG/DL (ref 65–99)
GLUCOSE BLD-MCNC: 173 MG/DL (ref 65–99)
GLUCOSE BLD-MCNC: 176 MG/DL (ref 65–99)

## 2020-12-07 PROCEDURE — 700105 HCHG RX REV CODE 258: Performed by: STUDENT IN AN ORGANIZED HEALTH CARE EDUCATION/TRAINING PROGRAM

## 2020-12-07 PROCEDURE — 700111 HCHG RX REV CODE 636 W/ 250 OVERRIDE (IP): Performed by: STUDENT IN AN ORGANIZED HEALTH CARE EDUCATION/TRAINING PROGRAM

## 2020-12-07 PROCEDURE — A9270 NON-COVERED ITEM OR SERVICE: HCPCS | Performed by: STUDENT IN AN ORGANIZED HEALTH CARE EDUCATION/TRAINING PROGRAM

## 2020-12-07 PROCEDURE — 97605 NEG PRS WND THER DME<=50SQCM: CPT

## 2020-12-07 PROCEDURE — 90832 PSYTX W PT 30 MINUTES: CPT | Performed by: PSYCHOLOGIST

## 2020-12-07 PROCEDURE — 700102 HCHG RX REV CODE 250 W/ 637 OVERRIDE(OP): Performed by: PSYCHIATRY & NEUROLOGY

## 2020-12-07 PROCEDURE — 700111 HCHG RX REV CODE 636 W/ 250 OVERRIDE (IP): Performed by: INTERNAL MEDICINE

## 2020-12-07 PROCEDURE — A9270 NON-COVERED ITEM OR SERVICE: HCPCS | Performed by: PSYCHIATRY & NEUROLOGY

## 2020-12-07 PROCEDURE — A9270 NON-COVERED ITEM OR SERVICE: HCPCS | Performed by: INTERNAL MEDICINE

## 2020-12-07 PROCEDURE — 770001 HCHG ROOM/CARE - MED/SURG/GYN PRIV*

## 2020-12-07 PROCEDURE — 700102 HCHG RX REV CODE 250 W/ 637 OVERRIDE(OP): Performed by: STUDENT IN AN ORGANIZED HEALTH CARE EDUCATION/TRAINING PROGRAM

## 2020-12-07 PROCEDURE — 99232 SBSQ HOSP IP/OBS MODERATE 35: CPT | Performed by: STUDENT IN AN ORGANIZED HEALTH CARE EDUCATION/TRAINING PROGRAM

## 2020-12-07 PROCEDURE — 82962 GLUCOSE BLOOD TEST: CPT

## 2020-12-07 PROCEDURE — 700102 HCHG RX REV CODE 250 W/ 637 OVERRIDE(OP): Performed by: INTERNAL MEDICINE

## 2020-12-07 RX ADMIN — AMIODARONE HYDROCHLORIDE 200 MG: 200 TABLET ORAL at 06:10

## 2020-12-07 RX ADMIN — Medication 400 MG: at 06:10

## 2020-12-07 RX ADMIN — KETOCONAZOLE: 20 CREAM TOPICAL at 06:12

## 2020-12-07 RX ADMIN — SPIRONOLACTONE 25 MG: 50 TABLET ORAL at 06:11

## 2020-12-07 RX ADMIN — FUROSEMIDE 20 MG: 10 INJECTION, SOLUTION INTRAMUSCULAR; INTRAVENOUS at 06:12

## 2020-12-07 RX ADMIN — LEVOTHYROXINE SODIUM 112 MCG: 112 TABLET ORAL at 06:11

## 2020-12-07 RX ADMIN — INSULIN HUMAN 3 UNITS: 100 INJECTION, SOLUTION PARENTERAL at 23:18

## 2020-12-07 RX ADMIN — RISPERIDONE 1 MG: 1 TABLET ORAL at 17:46

## 2020-12-07 RX ADMIN — MODAFINIL 100 MG: 100 TABLET ORAL at 06:12

## 2020-12-07 RX ADMIN — METOPROLOL SUCCINATE 75 MG: 50 TABLET, EXTENDED RELEASE ORAL at 06:10

## 2020-12-07 RX ADMIN — FLUCONAZOLE 400 MG: 200 TABLET ORAL at 06:05

## 2020-12-07 RX ADMIN — Medication 400 MG: at 17:46

## 2020-12-07 RX ADMIN — VANCOMYCIN HYDROCHLORIDE 1000 MG: 500 INJECTION, POWDER, LYOPHILIZED, FOR SOLUTION INTRAVENOUS at 13:17

## 2020-12-07 RX ADMIN — TRAZODONE HYDROCHLORIDE 50 MG: 50 TABLET ORAL at 21:06

## 2020-12-07 RX ADMIN — CETIRIZINE HYDROCHLORIDE 10 MG: 10 TABLET, FILM COATED ORAL at 06:11

## 2020-12-07 RX ADMIN — LISINOPRIL 20 MG: 20 TABLET ORAL at 06:10

## 2020-12-07 RX ADMIN — METOPROLOL SUCCINATE 75 MG: 50 TABLET, EXTENDED RELEASE ORAL at 17:46

## 2020-12-07 RX ADMIN — FAMOTIDINE 20 MG: 20 TABLET, FILM COATED ORAL at 06:10

## 2020-12-07 RX ADMIN — CEFTRIAXONE 2 G: 2 INJECTION, POWDER, FOR SOLUTION INTRAMUSCULAR; INTRAVENOUS at 07:40

## 2020-12-07 RX ADMIN — INSULIN GLARGINE 15 UNITS: 100 INJECTION, SOLUTION SUBCUTANEOUS at 06:15

## 2020-12-07 RX ADMIN — RISPERIDONE 0.5 MG: 1 TABLET ORAL at 06:12

## 2020-12-07 ASSESSMENT — ENCOUNTER SYMPTOMS
ABDOMINAL PAIN: 0
MUSCULOSKELETAL NEGATIVE: 1
CARDIOVASCULAR NEGATIVE: 1
CONSTITUTIONAL NEGATIVE: 1
PSYCHIATRIC NEGATIVE: 1
RESPIRATORY NEGATIVE: 1
NEUROLOGICAL NEGATIVE: 1
NAUSEA: 0
HEARTBURN: 0
VOMITING: 0
DIARRHEA: 0

## 2020-12-07 ASSESSMENT — PAIN DESCRIPTION - PAIN TYPE
TYPE: ACUTE PAIN;SURGICAL PAIN
TYPE: ACUTE PAIN

## 2020-12-07 NOTE — DISCHARGE PLANNING
Agency/Facility Name: [facility]  Referral sent per [facility] form @ 0848      @0801  Agency/Facility Name: [facility]  Outcome: L/M for admissions requesting referral status and bed availability.

## 2020-12-07 NOTE — DISCHARGE PLANNING
Anticipated Discharge Disposition: [facility]    Action: This case was discussed today during Morning Huddle. Per MD, the Legal Hold was discontinued and the patient is medically cleared for transfer to SNF or [facility]. LSW instructed [facility] Mamie to follow up with [facility] and [facility].    Barriers to Discharge: Accepting Facility & Bed.    Plan: As Above. Awaiting Update from [facility].

## 2020-12-07 NOTE — PROGRESS NOTES
Assumed care of patient at 0645. Assessment complete.  AA&Ox4. Denies CP/SOB.  Reporting 2/10 pain. Pain managed per MAR. Educated regarding non pharmacological modalities for pain management.   abd wound vac in place, appliance checked. L flank IR drain to bulb suction, flushed per order.   Tolerating diet. Denies N/V.  Zabala in place, draining to gravity. Zabala care provided.   Ostomy with moderate output.  Pt 2 person stand pivot. Needs encouragement. Q2 turns in place.   Tele sitter in place for safety. Bed alarm on and audible for safety. High fall risk per juli chávez assessment.   Hourly rounding in place.  All needs met at this time. Call light within reach. Pt calls appropriately. Will continue to monitor.

## 2020-12-07 NOTE — CONSULTS
"PSYCHOLOGICAL FOLLOW-UP:  Reason for admission: Perforated sigmoid colon (HCC) [K63.1]  Length of Visit: 30min    Legal status: Legal Status: Voluntary    Chief Complaint: \"I was proud of myself.\"    HPI: Met with the patient for brief individual psychotherapy. Patient presented with a bright and appropriate affect and reported a \"ok\" mood. He continues to deny current and active suicidal thoughts, plans, and intent. He continues to be future focused on physical recovery. Session focused on progress he made in physical therapy and coping with stress. Patient was proud of himself for sitting up to the side of the bed on his own and standing up. He stated his continued intention to work on regaining his strength. The patient and this writer also spoke about times when (in his words) \"I fall off the banana cart.\" Discussed the advantages of having a plan to cope with stress as well as a self-care plan to regularly promote pleasant emotions. Patient thanked this writer for helping him the other day and stated his desire to continuing meeting with this writer while he is in the acute care setting.     Psychiatric Examination:  Vitals: Blood pressure 127/65, pulse (!) 103, temperature 36.4 °C (97.6 °F), temperature source Temporal, resp. rate 19, height 1.829 m (6'), weight (!) 194.5 kg (428 lb 12.7 oz), SpO2 95 %.  Musculoskeletal: normal psychomotor activity, no tics or unusual mannerisms noted  Appearance and Eye Contact: appropriate dress and grooming. Behavior is calm, cooperative,  appropriate eye contact  Attention/Alertness: Alert  Thought Process: Linear, Logical and Goal Directed    Thought Content: No psychotic processes noted  Speech: Clear with normal rate and rhythm  Mood: \"ok\"            Affect: bright and euthymic         SI/HI: Denies    Memory: Recent and remote memory appear intact    Orientation: alert, oriented to person, place and time  Insight into symptoms: good  Judgement into " symptoms:good    ASSESSMENT: Patient continues to deny suicidality and present as future focused. He does not meet criteria for a legal hold.    DSM5 Diagnostic Considerations:   Unspecified Anxiety Disorder  Unspecified Depressive Disorder    PLAN:  Legal status: Voluntary  Records reviewed: yes  Will continue to follow  Thank you for the consult.    Madhuri Fernandez, Ph.D.

## 2020-12-07 NOTE — PROGRESS NOTES
Pt is A&O 4  Pain well controlled on current regimen   Denies nausea  Tolerating a regular diet, no sharps ordered  Pt has midline incision, WV in place to -125mmHg suction, operating appropriately  IR Drain to bulb suction, flushed at start of shift per MD order  + Voids per frye catheter  + flatus per ostomy  + small output BM per ostomy  Up assist x2 with FWW  Bed alarm on, pt high fall risk per juli chávez  Reviewed plan of care with patient, bed in lowest position and locked, pt resting comfortably now, call light within reach, all needs met at this time. Interventions will be executed per plan of care

## 2020-12-07 NOTE — PROGRESS NOTES
Hospital Medicine Daily Progress Note    Date of Service  12/7/2020    Chief Complaint  55 y.o. male admitted 11/3/2020 with abdominal pain    Hospital Course    55-year-old male with history of schizophrenia, diabetes, heart failure with ejection fraction 30% and atrial fibrillation presented 11/3 with left upper quadrant pain, initially the patient had perisplenic hematoma after fall however he developed fever and severe pain, CT scan showed perisplenic abscess with possible fistula and perforation, patient surgery for colostomy and for bowel perforation, blood culture on 11/31 showed Streptococcus sepsis,  A new drain was placed on 11/25, drain fluid culture from the abscess showed Streptococcus and yeast Candida, patient was evaluated by infectious disease and he was treated with ceftriaxone, fluconazole and later added vancomycin to treat Enterococcus since patient is PCN allergy. Repeat CT on 12/4/2020: Decreased to perisplenic fluid collection with small amount of residual fluid, ID recommended to continue with antibiotic until 12/11/2020    Interval Problem Update  -Seen and examined the patient at bedside, wound vac draining dark fluid, reports pain to Lower abdomen, denies any fever, chills, WO[place], anxiety  - CT repeated on 12/4/2020 small amount of residual fluid, ID recommended to continue with antibiotic until 12/11/2020  -Psych eval done rec dc legal hold    - patient has continued medical needs and will likely need LTAC or SNF.  -Labs are reviewed, improving on leukocytosis and kidney function.  -The patient is ready for discharge/placement   Consultants/Specialty  General surgeon  Infectious disease    Code Status  Full Code    Disposition  LTAC or SNF   Medically cleared for placement.      Review of Systems  Review of Systems   Constitutional: Negative.    HENT: Negative.    Respiratory: Negative.    Cardiovascular: Negative.    Gastrointestinal: Negative for abdominal pain, diarrhea, heartburn,  nausea and vomiting.   Genitourinary: Negative.    Musculoskeletal: Negative.    Skin: Negative.    Neurological: Negative.    Psychiatric/Behavioral: Negative.         Physical Exam  Temp:  [36.1 °C (96.9 °F)-36.6 °C (97.9 °F)] 36.4 °C (97.6 °F)  Pulse:  [] 103  Resp:  [18-20] 19  BP: (102-127)/(64-67) 127/65  SpO2:  [95 %-97 %] 95 %    Physical Exam  Constitutional:       Appearance: He is obese. He is not ill-appearing.   HENT:      Head: Normocephalic and atraumatic.      Comments: Bilateral facial rash  Eyes:      General: No scleral icterus.  Abdominal:      General: There is no distension.      Palpations: There is no mass.      Tenderness: There is abdominal tenderness. There is no guarding.      Comments: Colostomy and a drain in place  Wound vac in place   Musculoskeletal:         General: No swelling or deformity.      Right lower leg: No edema.      Left lower leg: No edema.   Skin:     General: Skin is warm.      Coloration: Skin is not jaundiced or pale.      Findings: No bruising.   Neurological:      General: No focal deficit present.      Mental Status: He is alert and oriented to person, place, and time. Mental status is at baseline.      Cranial Nerves: No cranial nerve deficit.      Motor: No weakness.         Fluids    Intake/Output Summary (Last 24 hours) at 12/7/2020 1345  Last data filed at 12/7/2020 1000  Gross per 24 hour   Intake --   Output 3505 ml   Net -3505 ml       Laboratory  Recent Labs     12/05/20  0603 12/06/20  0527   WBC 10.5 10.1   RBC 3.69* 3.65*   HEMOGLOBIN 9.5* 9.3*   HEMATOCRIT 31.7* 31.5*   MCV 85.9 86.3   MCH 25.7* 25.5*   MCHC 30.0* 29.5*   RDW 58.6* 59.7*   PLATELETCT 586* 608*   MPV 9.4 9.2     Recent Labs     12/05/20  0603 12/06/20  0527   SODIUM 134* 134*   POTASSIUM 4.2 4.2   CHLORIDE 95* 98   CO2 31 29   GLUCOSE 126* 125*   BUN 17 17   CREATININE 0.96 0.91   CALCIUM 8.9 8.8                   Imaging  CT-ABDOMEN-PELVIS WITH   Final Result      1.  Interval  placement of a surgical drain into a splenic fluid collection. The fluid collection has decreased in size with a small amount of residual fluid seen surrounding the percutaneous catheter drain tip.      2.  No evidence of bowel obstruction.      3.  Left lower quadrant ostomy site.      4.  Bibasilar atelectasis with small bilateral pleural effusions.      5.  Gallstones.      6.  Fatty liver.      7.  Anasarca.      8.  Small amount of ascites.      IR-MIDLINE CATHETER INSERTION WO GUIDANCE > AGE 3   Final Result                  Ultrasound-guided midline placement performed by qualified nursing staff    as above.          CT-DRAIN-PERITONEAL   Final Result      Successful splenic abscess drainage tube placement.      Plan: Twice daily flushes with 10 mL of sterile saline. Monitor outputs.         CT-ABDOMEN-PELVIS WITH   Final Result      1.  Intraparenchymal collection in the anterior aspect of the spleen has increased in size from the prior exam.      2.  Small amount of free fluid in the pelvis has increased from the prior exam.      3.  Small bilateral pleural effusions layer posteriorly, left greater than right, slightly decreased from the prior exam. Adjacent airspace disease is likely atelectasis.      4.  Cholelithiasis.      5.  Right renal scarring.      [id number] VIEW   Final Result      1.  No evidence of bowel obstruction.   2.  Postoperative changes as described.      CT-ABDOMEN-PELVIS W/O   Final Result      Postsurgical changes with a left abdominal stoma. Parastomal densities may be related to postsurgical hematoma measuring up to 2.8 x 2.1 cm.      Small amount of free fluid in the abdomen and pelvis.      Collection along the anterior spleen measures approximately 2.9 x 4.3 cm is decreased in size compared to the prior examination. Evaluation is limited by lack of intravenous contrast.      Third spacing.      Trace free intraperitoneal air is likely related to recent surgery.      Moderate  left and small right pleural effusions with overlying atelectasis/consolidation.      Cortical scarring of the right kidney.      Cholelithiasis.         DX-CHEST-PORTABLE (1 VIEW)   Final Result      1.  Bilateral airspace and interstitial infiltrates.      2.  Cardiomegaly.      US-RENAL   Final Result    Examination was limited due to poor sound penetration related to body habitus.   Grossly normal appearance of the kidneys.      EC-ECHOCARDIOGRAM COMPLETE W/ CONT   Final Result      DX-CHEST-PORTABLE (1 VIEW)   Final Result      No pneumothorax identified following right subclavian line placement      Worsening atelectasis with consolidation at the bases not excluded      Stable cardiac silhouette enlargement      CT-ABDOMEN-PELVIS WITH   Final Result      Interval decrease in size of perisplenic abscess. Pigtail catheter now in place      Abscess has likely fistulous communication with the abutting splenic flexure of the colon. Colon is otherwise normal in appearance      Mild hepatosplenomegaly      Decreasing small left pleural effusion      Subacute anterior wedge compression fractures in the caudal thoracic spine      CT-DRAIN-PERITONEAL   Final Result      1.  CT guided perisplenic abscess catheter drainage.   2.  The current plan is to obtain a follow-up CT in 5-7 days..      OUTSIDE IMAGES-CT CHEST   Final Result      OUTSIDE IMAGES-CT ABDOMEN /PELVIS   Final Result      CT-ABDOMEN-PELVIS WITH   Final Result   Addendum 1 of 1   Addendum:   A prior noncontrast CT of the abdomen and pelvis from [facility] dated 10/19/2020 was made available for comparison. On the    prior study a perisplenic/subcapsular fluid collection was seen however    did not contain air at that time and    there was no discrete evidence of communication with the transverse colon.    Communication with splenic flexure of the colon and air within the    collection are new from the prior study.      Final       DX-CHEST-PORTABLE (1 VIEW)   Final Result      Slight interval interval improvement in congestive heart failure.           Assessment/Plan  * Spontaneous perforation of colon ([facility])- (present on admission)  Assessment & Plan  Admitted with LUQ abscess and perforation at splenic flexure   Segmental colectomy, colostomy, Patti pouch, I & D of abdominal wall abscess  Wound cultures: enterococcus and candida  And blood culture showed strep  Patient was evaluated and followed by ID  To continue ceftriaxone, fluconazole and vancomycin until 12/11/2020  Repeat CT abdomen and pelvis on 12/4 with residual perisplenic fluid  Surgery signed off to follow-up with Dr. Desouza as outpatient    Sepsis ([facility])  Assessment & Plan  Secondary to perforation of colon,  Wound cultures: enterococcus & candida.  Started on Linezolid and Fluconazole. But his QTc is prolonged.  - DCed linezolide and started on vancomycin.  - continue Fluconazole  - DC Zyvox due to prolonged QTc  - continue  vancomycin (11/17)  - continue ceftriaxone 11/17and fluconazole  - Dced Flagyl on 11/21 due to nausea - nausea improved      Leucocytosis  Assessment & Plan  Due to intra-abdominal infection and abscess   continue antibiotics  Labs daily  Improving      Chronic heart failure ([facility])  Assessment & Plan  History of ischemic cardiomyopathy with ejection fraction 30/40%, no acute decompensation during this admission  With right ventricle dysfunction  Continue metoprolol and lisinopril and Aldactone  Continue amiodarone for atrial fibrillation  Patient Bicarb trending up will decrease lasix to 20 mg once dailiy  Weight daily  I and O -1800 adequate diuresis    Paroxysmal A-fib with RVR ([facility])- (present on admission)  Assessment & Plan  Seen by cardiac electrophysiologist in 2019 and deemed not to be good candidate for anticoagulation due to frequent falls 2/2 orthostatic hypotension  CHADSVASC of 3   He was given digoxin however discontinued due to  toxicity  Heart rate is controlled  Continue metoprolol XL 75 mg twice daily and amiodarone 20 mg daily  Continue holding anticoagulation at this time for possible surgery  To follow-up with cardiologist as outpatient    Prolonged QT interval  Assessment & Plan  Patient's QTc on admission 477 increased to 535 with Linezolid, zofran, zoloft, olanzapine.  DCed Linezolid, zofran, olanzapine & zoloft  QTc improved from 535 to 425 to 473  Seen by psychiatry for medication recommendations started on resperidone and modafinil.  - avoid QT prolonging medication.    Hypothyroidism  Assessment & Plan  Obese male   TSH 16.0 and T4 1.14  start on Levothyroxine 112mcg daily.  Repeat thyroid function after 6 to 8 weeks    Schizophrenia (HCC)  Assessment & Plan  History of schizophrenia on olanzapine and sertraline.  Developed hallucination during this hospitalization, also had delayed prolonged QTC  Evaluated by psychiatrist and started with risperidone and modafinil, discontinue Zoloft and olanzapine  Stable at this time continue monitoring and follow-up with psychiatrist.  Patient reported feeling suicidal on 11/30/2020, patient placed on legal hold and psych consult placed    Elevated digoxin level  Assessment & Plan  H/o N/v and visual hallucinations including yellow halos, circles and squares  AFTER STARTING digoxin and 11/14 digoxin level 1.5 & on 11/17 Repeat digoxin level > 5,   & 0.8  Resolved  Keep holding digoxin    SRUTHI (acute kidney injury) ([facility])  Assessment & Plan  Resolved    Type 2 diabetes mellitus, without long-term current use of insulin, with peripheral neuropathy (HCC)- (present on admission)  Assessment & Plan  Patient states blood sugar well controlled at home with metformin.   Last a1c of 7.8 on 11/10/20   - continue Lantus 10U daily  - Sliding scale insulin with hypoglycemia protocol  - Ensure strict glucose control          VTE prophylaxis: lovenox

## 2020-12-07 NOTE — WOUND TEAM
[facility]  Inpatient Services  Wound and Skin Care Progress note    Admission Date: 11/3/2020     Last order of IP CONSULT TO WOUND CARE was found on 11/16/2020 from Hospital Encounter on 11/3/2020     HPI, PMH, SH: Reviewed    Unit where seen by Wound Team: [id number]     WOUND CONSULT/FOLLOW UP RELATED TO:   Scheduled abdominal NPWT dressing    Self Report / Pain Level:  Tolerated well without pain medications    OBJECTIVE:  Vac dressing intact  WOUND TYPE, LOCATION, CHARACTERISTICS (Pressure Injuries: location, stage, POA or date identified)     Negative Pressure Wound Therapy 11/10/20 Surgical Abdomen Midline (Active)   Vacuum Serial Number [id number] 11/27/20 2100   NPWT Pump Mode / Pressure Setting Ulta;Continuous;125 mmHg    Dressing Type Medium;Black Foam (Regular)    Number of Foam Pieces Used 3    Canister Changed No    Output (mL) 0 mL    NEXT Dressing Change/Treatment Date 12/09/20    WOUND NURSE ONLY - Time Spent with Patient (mins) 75      Wound 11/10/20 Full Thickness Wound Abdomen Midline wound vac (Active)   Wound Image   12/04/20 1527   Site Assessment Red;Granulation tissue    Periwound Assessment Clean;Dry;Intact    Margins Attached edges;Defined edges    Closure Secondary intention    Drainage Amount Small    Drainage Description Serosanguineous    Treatments Cleansed;Site care    Wound Cleansing Approved Wound Cleanser    Periwound Protectant Benzoin;Drape    Dressing Cleansing/Solutions Not Applicable    Dressing Options Wound Vac    Dressing Changed Changed    Dressing Status Clean;Dry;Intact    Dressing Change/Treatment Frequency Monday, Wednesday, Friday, and As Needed    NEXT Dressing Change/Treatment Date 12/09/20    NEXT Weekly Photo (Inpatient Only) 12/09/20    Number of Staples Removed 10    Non-staged Wound Description Full thickness    Wound Length (cm) 15.5 cm    Wound Width (cm) 6 cm    Wound Depth (cm) 1.5 cm    Wound Surface Area (cm^2) 93 cm^2    Wound Volume (cm^3)  139.5 cm^3    Wound Healing % 82    Wound Bed Granulation (%) 80 %    Tunneling (cm) 2 cm    Tunneling Clock Position of Wound 12    Undermining (cm) 0 cm    Shape Oval x1, round x1    Wound Odor None    Exposed Structures None    WOUND NURSE ONLY - Time Spent with Patient (mins) 60       Lab Values:    Lab Results   Component Value Date/Time    WBC 10.1 12/06/2020 05:27 AM    RBC 3.65 (L) 12/06/2020 05:27 AM    HEMOGLOBIN 9.3 (L) 12/06/2020 05:27 AM    HEMATOCRIT 31.5 (L) 12/06/2020 05:27 AM    CREACTPROT 7.52 (H) 11/29/2020 01:25 AM    SEDRATEWES 13 10/23/2018 08:02 AM    HBA1C 7.8 (H) 11/10/2020 03:31 AM      Culture Results show:  Recent Results (from the past 720 hour(s))   CULTURE WOUND W/ GRAM STAIN    Collection Time: 11/25/20  4:52 PM    Specimen: Wound   Result Value Ref Range    Significant Indicator POS (POS)     Source WND     Site Joya-splenic drain     Culture Result (A)      Growth noted after further incubation, see below for  organism identification.      Gram Stain Result Moderate WBCs.  Rare Gram positive cocci.       Culture Result Enterococcus faecalis  Rare growth   (A)        Susceptibility    Enterococcus faecalis - ISAMAR     Daptomycin 4 Sensitive mcg/mL     Ampicillin <=2 Sensitive mcg/mL     Gent Synergy <=500 Sensitive mcg/mL     Vancomycin 1 Sensitive mcg/mL     Penicillin 2 Sensitive mcg/mL     INTERVENTIONS BY WOUND TEAM:   Chart and images reviewed. Discussed with pt and bedside RN. This RN in to assess patient. NPWT dressing removed, no retained foam. Wound cleansed with wound cleanser and gauze. Joya-wound prepped with benzoin and drape. Mepitel one strips applied over staples. One piece of half thickness spiraled black foam into depth and into tunnel at 12 O'clock. 1 piece of spiraled half thickness wound applied from superior wound to inferior over staples. 1 pieces of half thickness cut to fit black foam applied over superior wound to fill remaining wound depth.  All exposed foam  "secured with drape. A hole was cut in drape over superior wound and trac pad applied. Suction resumed at 125mmhg continuous no leaks identified.  Ostomy appliance then changed (see below).    Interdisciplinary consultation: Patient, Bedside RN, Wound RN ([place])      EVALUATION / RATIONALE FOR TREATMENT: Wound depth continues to decrease. Granulation tissue present. Continued NPWT to manage drainage and assist in wound closure.     Goals: Steady decrease in wound area and depth weekly.    NURSING PLAN OF CARE ORDERS (X):    Dressing changes: See Dressing Care orders: X  Skin care: See Skin Care orders: X  RN Prevention Protocol: X  Rectal tube care: See Rectal Tube Care orders:   Other orders:      WOUND TEAM PLAN OF CARE:   Dressing changes by wound team:                   Follow up 3 times weekly:                NPWT change 3 times weekly:  X - abdomen   Follow up 1-2 times weekly:    X -left flank   Follow up Bi-Monthly:                   Follow up as needed:       Other (explain):     Anticipated discharge plans:  LTACH:        SNF/Rehab:     X            Home Health Care:           Outpatient Wound Center:            Self Care:              [facility]  Inpatient Services  New Ostomy Management & Teaching     HPI:  Reviewed  PMH: Reviewed   SH: Reviewed      Objective: appliance intact.     Colostomy 11/10/20 Transverse LUQ (Active)   Wound Image   11/30/20 0900   Stomal Appliance Assessment Clean;Dry;Intact    Stoma Assessment Red    Stoma Shape Round;Budded Less Than One Inch    Stoma Size (in) 1.25    Peristomal Assessment Red    Mucocutaneous Junction Intact    Treatment Appliance Changed    Peristomal Protectant Paste Ring;No Sting Skin Prep    Stomal Appliance Paste Ring, 2\";2 1/4\" (57mm) CTF    Output (mL) 250 mL    Output Color Brown    WOUND RN ONLY - Stomal Appliance  2 Piece;2 1/4\" (57mm) CTF;Paste Ring, 2\"    Appliance (Pouch) # [id number]    Appliance Brand [employer]    Appliance Supplier " "[employer]    Secure Start completed Yes    Ostomy Care Resources Provided [facility] Tip Sheet    WOUND NURSE ONLY - Time Spent with Patient (mins) 30        Ostomy Appliance (type and size):  2 piece 2 1/4\" with paste ring, supplies left outside room due to legal hold     Interventions:  Previous appliance removed, cleansed skin with warm wash cloth, dried. New template made. Ostomy RN cut barrier to fit. Peristomal skin still slightly red. No sting applied.This RN applied paste ring to barrier and then applied barrier to skin. Used friction to adhered barrier to skin. Pouch attached to barrier and pouch end closed.                 Pt education: No education today, pt in pain after just having frye catheter placed.      Evaluation:  Peristomal skin is pink/red no sting in use.  Pt unable to perform most ostomy care alone. Patient likely to discharge to SNF or Rehab, currently on a legal hold with sitter at bedside. Stoma is viable and producing adequate amounts of stool.      Plan: Ostomy nurses to continue to follow for ostomy needs and teaching      Anticipated discharge needs: Supplies, supplier information, possible HH or outpatient ostomy clinic          "

## 2020-12-08 PROBLEM — F41.9 ANXIETY AND DEPRESSION: Status: ACTIVE | Noted: 2020-12-08

## 2020-12-08 PROBLEM — K65.1 INTRA-ABDOMINAL ABSCESS (HCC): Status: ACTIVE | Noted: 2020-12-08

## 2020-12-08 PROBLEM — R78.81 GRAM-POSITIVE BACTEREMIA: Status: ACTIVE | Noted: 2020-12-08

## 2020-12-08 PROBLEM — F32.A ANXIETY AND DEPRESSION: Status: ACTIVE | Noted: 2020-12-08

## 2020-12-08 PROBLEM — E11.65 TYPE 2 DIABETES MELLITUS WITH HYPERGLYCEMIA, WITHOUT LONG-TERM CURRENT USE OF INSULIN (HCC): Status: ACTIVE | Noted: 2017-11-22

## 2020-12-08 LAB
ANION GAP SERPL CALC-SCNC: 5 MMOL/L (ref 7–16)
BASOPHILS # BLD AUTO: 0.9 % (ref 0–1.8)
BASOPHILS # BLD: 0.08 K/UL (ref 0–0.12)
BUN SERPL-MCNC: 16 MG/DL (ref 8–22)
CALCIUM SERPL-MCNC: 9.3 MG/DL (ref 8.5–10.5)
CHLORIDE SERPL-SCNC: 96 MMOL/L (ref 96–112)
CO2 SERPL-SCNC: 29 MMOL/L (ref 20–33)
CREAT SERPL-MCNC: 0.91 MG/DL (ref 0.5–1.4)
EOSINOPHIL # BLD AUTO: 0.97 K/UL (ref 0–0.51)
EOSINOPHIL NFR BLD: 10.6 % (ref 0–6.9)
ERYTHROCYTE [DISTWIDTH] IN BLOOD BY AUTOMATED COUNT: 59.7 FL (ref 35.9–50)
GLUCOSE BLD-MCNC: 126 MG/DL (ref 65–99)
GLUCOSE BLD-MCNC: 132 MG/DL (ref 65–99)
GLUCOSE BLD-MCNC: 140 MG/DL (ref 65–99)
GLUCOSE BLD-MCNC: 145 MG/DL (ref 65–99)
GLUCOSE BLD-MCNC: 163 MG/DL (ref 65–99)
GLUCOSE SERPL-MCNC: 142 MG/DL (ref 65–99)
HCT VFR BLD AUTO: 33.4 % (ref 42–52)
HGB BLD-MCNC: 10.3 G/DL (ref 14–18)
IMM GRANULOCYTES # BLD AUTO: 0.05 K/UL (ref 0–0.11)
IMM GRANULOCYTES NFR BLD AUTO: 0.5 % (ref 0–0.9)
LYMPHOCYTES # BLD AUTO: 2.27 K/UL (ref 1–4.8)
LYMPHOCYTES NFR BLD: 24.8 % (ref 22–41)
MCH RBC QN AUTO: 26.3 PG (ref 27–33)
MCHC RBC AUTO-ENTMCNC: 30.8 G/DL (ref 33.7–35.3)
MCV RBC AUTO: 85.2 FL (ref 81.4–97.8)
MONOCYTES # BLD AUTO: 0.81 K/UL (ref 0–0.85)
MONOCYTES NFR BLD AUTO: 8.9 % (ref 0–13.4)
NEUTROPHILS # BLD AUTO: 4.96 K/UL (ref 1.82–7.42)
NEUTROPHILS NFR BLD: 54.3 % (ref 44–72)
NRBC # BLD AUTO: 0 K/UL
NRBC BLD-RTO: 0 /100 WBC
PLATELET # BLD AUTO: 576 K/UL (ref 164–446)
PMV BLD AUTO: 8.9 FL (ref 9–12.9)
POTASSIUM SERPL-SCNC: 3.8 MMOL/L (ref 3.6–5.5)
RBC # BLD AUTO: 3.92 M/UL (ref 4.7–6.1)
SODIUM SERPL-SCNC: 130 MMOL/L (ref 135–145)
WBC # BLD AUTO: 9.1 K/UL (ref 4.8–10.8)

## 2020-12-08 PROCEDURE — 97530 THERAPEUTIC ACTIVITIES: CPT

## 2020-12-08 PROCEDURE — A9270 NON-COVERED ITEM OR SERVICE: HCPCS | Performed by: PSYCHIATRY & NEUROLOGY

## 2020-12-08 PROCEDURE — A9270 NON-COVERED ITEM OR SERVICE: HCPCS | Performed by: STUDENT IN AN ORGANIZED HEALTH CARE EDUCATION/TRAINING PROGRAM

## 2020-12-08 PROCEDURE — 700111 HCHG RX REV CODE 636 W/ 250 OVERRIDE (IP): Performed by: INTERNAL MEDICINE

## 2020-12-08 PROCEDURE — 700102 HCHG RX REV CODE 250 W/ 637 OVERRIDE(OP): Performed by: STUDENT IN AN ORGANIZED HEALTH CARE EDUCATION/TRAINING PROGRAM

## 2020-12-08 PROCEDURE — 85025 COMPLETE CBC W/AUTO DIFF WBC: CPT

## 2020-12-08 PROCEDURE — 700105 HCHG RX REV CODE 258: Performed by: STUDENT IN AN ORGANIZED HEALTH CARE EDUCATION/TRAINING PROGRAM

## 2020-12-08 PROCEDURE — 700102 HCHG RX REV CODE 250 W/ 637 OVERRIDE(OP): Performed by: INTERNAL MEDICINE

## 2020-12-08 PROCEDURE — 700102 HCHG RX REV CODE 250 W/ 637 OVERRIDE(OP): Performed by: PSYCHIATRY & NEUROLOGY

## 2020-12-08 PROCEDURE — 770001 HCHG ROOM/CARE - MED/SURG/GYN PRIV*

## 2020-12-08 PROCEDURE — 80048 BASIC METABOLIC PNL TOTAL CA: CPT

## 2020-12-08 PROCEDURE — 36415 COLL VENOUS BLD VENIPUNCTURE: CPT

## 2020-12-08 PROCEDURE — 99232 SBSQ HOSP IP/OBS MODERATE 35: CPT | Performed by: FAMILY MEDICINE

## 2020-12-08 PROCEDURE — 97535 SELF CARE MNGMENT TRAINING: CPT

## 2020-12-08 PROCEDURE — 82962 GLUCOSE BLOOD TEST: CPT | Mod: 91

## 2020-12-08 PROCEDURE — 700111 HCHG RX REV CODE 636 W/ 250 OVERRIDE (IP): Performed by: STUDENT IN AN ORGANIZED HEALTH CARE EDUCATION/TRAINING PROGRAM

## 2020-12-08 PROCEDURE — A9270 NON-COVERED ITEM OR SERVICE: HCPCS | Performed by: INTERNAL MEDICINE

## 2020-12-08 RX ADMIN — RISPERIDONE 0.5 MG: 1 TABLET ORAL at 04:34

## 2020-12-08 RX ADMIN — RISPERIDONE 1 MG: 1 TABLET ORAL at 17:15

## 2020-12-08 RX ADMIN — LISINOPRIL 20 MG: 20 TABLET ORAL at 04:34

## 2020-12-08 RX ADMIN — MODAFINIL 100 MG: 100 TABLET ORAL at 04:32

## 2020-12-08 RX ADMIN — FAMOTIDINE 20 MG: 20 TABLET, FILM COATED ORAL at 04:34

## 2020-12-08 RX ADMIN — TRAZODONE HYDROCHLORIDE 50 MG: 50 TABLET ORAL at 20:44

## 2020-12-08 RX ADMIN — KETOCONAZOLE: 20 CREAM TOPICAL at 04:34

## 2020-12-08 RX ADMIN — CETIRIZINE HYDROCHLORIDE 10 MG: 10 TABLET, FILM COATED ORAL at 04:32

## 2020-12-08 RX ADMIN — SPIRONOLACTONE 25 MG: 50 TABLET ORAL at 04:32

## 2020-12-08 RX ADMIN — FUROSEMIDE 20 MG: 10 INJECTION, SOLUTION INTRAMUSCULAR; INTRAVENOUS at 04:32

## 2020-12-08 RX ADMIN — FLUCONAZOLE 400 MG: 200 TABLET ORAL at 04:32

## 2020-12-08 RX ADMIN — AMIODARONE HYDROCHLORIDE 200 MG: 200 TABLET ORAL at 04:32

## 2020-12-08 RX ADMIN — Medication 400 MG: at 17:15

## 2020-12-08 RX ADMIN — Medication 400 MG: at 04:32

## 2020-12-08 RX ADMIN — INSULIN GLARGINE 15 UNITS: 100 INJECTION, SOLUTION SUBCUTANEOUS at 05:54

## 2020-12-08 RX ADMIN — CEFTRIAXONE 2 G: 2 INJECTION, POWDER, FOR SOLUTION INTRAMUSCULAR; INTRAVENOUS at 04:34

## 2020-12-08 RX ADMIN — METOPROLOL SUCCINATE 75 MG: 50 TABLET, EXTENDED RELEASE ORAL at 17:15

## 2020-12-08 RX ADMIN — VANCOMYCIN HYDROCHLORIDE 1000 MG: 500 INJECTION, POWDER, LYOPHILIZED, FOR SOLUTION INTRAVENOUS at 12:25

## 2020-12-08 RX ADMIN — LEVOTHYROXINE SODIUM 112 MCG: 112 TABLET ORAL at 04:33

## 2020-12-08 RX ADMIN — METOPROLOL SUCCINATE 75 MG: 50 TABLET, EXTENDED RELEASE ORAL at 04:33

## 2020-12-08 ASSESSMENT — COGNITIVE AND FUNCTIONAL STATUS - GENERAL
PERSONAL GROOMING: A LITTLE
MOVING TO AND FROM BED TO CHAIR: A LITTLE
TURNING FROM BACK TO SIDE WHILE IN FLAT BAD: A LITTLE
HELP NEEDED FOR BATHING: A LOT
DAILY ACTIVITIY SCORE: 14
WALKING IN HOSPITAL ROOM: A LITTLE
CLIMB 3 TO 5 STEPS WITH RAILING: A LOT
TOILETING: TOTAL
DRESSING REGULAR LOWER BODY CLOTHING: A LOT
SUGGESTED CMS G CODE MODIFIER MOBILITY: CK
MOVING FROM LYING ON BACK TO SITTING ON SIDE OF FLAT BED: A LOT
STANDING UP FROM CHAIR USING ARMS: A LITTLE
DRESSING REGULAR UPPER BODY CLOTHING: A LITTLE
MOBILITY SCORE: 16
SUGGESTED CMS G CODE MODIFIER DAILY ACTIVITY: CK
EATING MEALS: A LITTLE

## 2020-12-08 ASSESSMENT — ENCOUNTER SYMPTOMS
PALPITATIONS: 0
SHORTNESS OF BREATH: 0
COUGH: 0
NAUSEA: 0
BACK PAIN: 0
HEADACHES: 0
HEARTBURN: 0
SORE THROAT: 0
DIAPHORESIS: 0
DIARRHEA: 0
MYALGIAS: 0
BLURRED VISION: 0
FLANK PAIN: 0
ABDOMINAL PAIN: 0
WEAKNESS: 1
NERVOUS/ANXIOUS: 0
WHEEZING: 0
NECK PAIN: 0
FEVER: 0
DIZZINESS: 0
SPEECH CHANGE: 0
CHILLS: 0
VOMITING: 0
FOCAL WEAKNESS: 0
SENSORY CHANGE: 0

## 2020-12-08 ASSESSMENT — GAIT ASSESSMENTS
ASSISTIVE DEVICE: FRONT WHEEL WALKER
DISTANCE (FEET): 40
GAIT LEVEL OF ASSIST: MINIMAL ASSIST

## 2020-12-08 ASSESSMENT — PAIN DESCRIPTION - PAIN TYPE
TYPE: ACUTE PAIN
TYPE: ACUTE PAIN

## 2020-12-08 NOTE — DISCHARGE PLANNING
Agency/Facility Name: [facility]  Spoke To: Kristen  Outcome: Referral pending and no beds available today.     @1046  Agency/Facility Name: [facility]  Spoke To: Bryson  Outcome: Facility has no beds     @1143  Agency/Facility Name: [facility]  Spoke To: Courtney  Outcome: Pt. Is accepted but facility needs updated information. [facility] refaxed at 1140. Facility can take pt. With Vanco but Courtney will have to pass it through her supervisor. Facility notified that pt. Will be on Vanco through Saturday. Facility won't have a bed available in the next few days.  JASMEET German notified.

## 2020-12-08 NOTE — THERAPY
"Occupational Therapy  Daily Treatment     Patient Name: Sebastián Castro  Age:  55 y.o., Sex:  male  Medical Record #: [id number]  Today's Date: 12/8/2020     Precautions  Precautions: (P) Fall Risk(wound vac, ostomy, BETTE drain)  Comments: wound vac, BETTE drain, ostomy    Assessment    Pt seen for follow up OT tx session, pt has made significant progress in therapy and is able to ambulate in room as well as exhibit improved activity tolerance and overall strength. Pt would continue to benefit from skilled therapy as he did required significant physical assistance for session, will continue to recommend post-acute placement for addressing deficits.    Plan    Continue current treatment plan.    DC Equipment Recommendations: (P) Unable to determine at this time  Discharge Recommendations: (P) Recommend post-acute placement for additional occupational therapy services prior to discharge home    Subjective    \"Let's try it\"     Objective       12/08/20 1126   Precautions   Precautions Fall Risk  (wound vac, ostomy, BETTE drain)   Cognition    Level of Consciousness Alert   Balance   Sitting Balance (Static) Fair   Sitting Balance (Dynamic) Fair -   Standing Balance (Static) Fair -   Standing Balance (Dynamic) Fair -   Weight Shift Sitting Fair   Weight Shift Standing Fair   Skilled Intervention Verbal Cuing;Tactile Cuing   Comments w/ FWW   Bed Mobility    Supine to Sit Supervised   Scooting Minimal Assist   Rolling Minimal Assist to Rt.   Skilled Intervention Sequencing   Activities of Daily Living   Eating Independent   Grooming Supervision;Seated   Upper Body Dressing Supervision   Lower Body Dressing Maximal Assist   Skilled Intervention Verbal Cuing   How much help from another person does the patient currently need...   Putting on and taking off regular lower body clothing? 2   Bathing (including washing, rinsing, and drying)? 2   Toileting, which includes using a toilet, bedpan, or urinal? 1   Putting on and taking off " regular upper body clothing? 3   Taking care of personal grooming such as brushing teeth? 3   Eating meals? 3   6 Clicks Daily Activity Score 14   Functional Mobility   Sit to Stand Moderate Assist   Bed, Chair, Wheelchair Transfer Minimal Assist   Toilet Transfers Refused   Transfer Method Stand Step   Mobility bed mobility, STS multiple times with seated rest breaks, ambulation within room   Skilled Intervention Compensatory Strategies   Visual Perception   Visual Perception  WDL   Activity Tolerance   Sitting in Chair 10  (left seated in chair)   Sitting Edge of Bed 5   Standing 5   Patient / Family Goals   Patient / Family Goal #1 To get my strength back   Goal #1 Outcome Progressing slower than expected   Short Term Goals   Short Term Goal # 2 Pt will dress LB with AE & Min A   Goal Outcome # 2 Progressing slower than expected   Short Term Goal # 3 Pt will be Mod A with BSC transfers   Goal Outcome # 3 Progressing slower than expected   Education Group   Education Provided Role of Occupational Therapist   Role of Occupational Therapist Patient Response Patient;Acceptance;Explanation   Interdisciplinary Plan of Care Collaboration   IDT Collaboration with  Nursing   Patient Position at End of Therapy Seated;Chair Alarm On;Call Light within Reach;Tray Table within Reach;Phone within Reach   Collaboration Comments RN updated on outcome

## 2020-12-08 NOTE — PROGRESS NOTES
Bedside report received. Assessment completed.  Pt is A&O x4. Pt on room air.   Denies pain.  Denies nausea.  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 Baseline for pt.  Midline incision with wound vac. CDI, no signs of air leak.   IR drain to bulb suction.   LLQ ostomy, + output, appliance changed.  Zabala catheter in place, + output  Regular diet. Tolerates well.   Pt up max assist. Tolerates well.   Call light within reach. All needs met at this time. Fall Precautions and hourly rounding in place.

## 2020-12-08 NOTE — PROGRESS NOTES
Pharmacy Kinetics 55 y.o. male on vancomycin day # 20 2020    Currently on Vancomycin  1000mg iv q24hr  Provider specified end date: 20    Indication for Treatment: IAI    Pertinent history per medical record: Admitted on 11/3/2020 for  concern of abdominal pain after surgical intervention. Concern for subsequent colonic perforation and possible abscess. ID and surgery consulted.     Other antibiotics: Ceftriaxone 2 grams q24H, fluconazole 400 mg q24H     Allergies: Daptomycin, Pcn [penicillins], Unasyn [ampicillin-sulbactam sodium], and Vancomycin   List concerns for renal function: BMI 58.16 kg/m2; albumin <3; abnormal LFTs     Pertinent cultures to date:   20 - wound (vj-splenic drain): E faecalis  11/10/20 - colon perforation: E faecalis; C albicans     MRSA nares swab if pneumonia is a concern (ordered/positive/negative/n-a): N/A    Recent Labs     20  0603 20  0527   WBC 10.5 10.1   NEUTSPOLYS 52.10 52.40     Recent Labs     20  0603 20  0527   BUN 17 17   CREATININE 0.96 0.91     Recent Labs     20  1217   [facility]       Intake/Output Summary (Last 24 hours) at 2020 1717  Last data filed at 2020 1000  Gross per 24 hour   Intake --   Output 2300 ml   Net -2300 ml      /65   Pulse (!) 103   Temp 36.4 °C (97.6 °F) (Temporal)   Resp 19   Ht 1.829 m (6')   Wt (!) 194.5 kg (428 lb 12.7 oz)   SpO2 95%  Temp (24hrs), Av.4 °C (97.5 °F), Min:36.1 °C (96.9 °F), Max:36.6 °C (97.9 °F)      A/P   1. Vancomycin dose change: none  2. Next vancomycin level: 2-3 days or sooner if indicated  3. Goal trough: 10-15mcg/mL  4. Comments:  Renal function appears stable with adequate UOP. No leukocytosis, AF. ID and surgery signed off 20. Plan for completion of therapy course 20, stop date in Epic. Continue current regimen. Pharmacy will monitor/adjust as needed per protocol.     JONNY Lowe Pharm.D.

## 2020-12-08 NOTE — PROGRESS NOTES
Pharmacy Kinetics 55 y.o. male on vancomycin day # 21 2020    Currently on Vancomycin  1000mg iv q24hr  Provider specified end date: 20     Indication for Treatment: IAI     Pertinent history per medical record: Admitted on 11/3/2020 for  concern of abdominal pain after surgical intervention. Concern for subsequent colonic perforation and possible abscess. ID and surgery consulted.     Other antibiotics: Ceftriaxone 2 grams q24H, fluconazole 400 mg q24H     Allergies: Daptomycin, Pcn [penicillins], Unasyn [ampicillin-sulbactam sodium], and Vancomycin   List concerns for renal function: BMI 58.16 kg/m2; albumin <3; abnormal LFTs     Pertinent cultures to date:   20 - wound (vj-splenic drain): E faecalis  11/10/20 - colon perforation: E faecalis; C albicans     MRSA nares swab if pneumonia is a concern (ordered/positive/negative/n-a): N/A    Recent Labs     20  0527 20  0708   WBC 10.1 9.1   NEUTSPOLYS 52.40 54.30     Recent Labs     20  0527 20  0708   BUN 17 16   CREATININE 0.91 0.91     Recent Labs     20  1217   VANCOTROUGH 14.6       Intake/Output Summary (Last 24 hours) at 2020 1214  Last data filed at 2020 0815  Gross per 24 hour   Intake 840 ml   Output 3336 ml   Net -2496 ml      /83   Pulse 85   Temp 36.3 °C (97.4 °F) (Temporal)   Resp 20   Ht 1.829 m (6')   Wt (!) 194.5 kg (428 lb 12.7 oz)   SpO2 94%  Temp (24hrs), Av.3 °C (97.4 °F), Min:36.1 °C (96.9 °F), Max:36.5 °C (97.7 °F)      A/P        1. Vancomycin dose change: none  2. Next vancomycin level: none unless therapy is extended  3. Goal trough: 10-15mcg/mL  4. Comments:  Renal function appears stable with adequate UOP. No leukocytosis, AF. ID and surgery signed off 20. Plan for completion of therapy course 20, stop date in Epic. Continue current regimen. Pharmacy will monitor/adjust as needed per protocol.      JONNY Lowe, Pharm.D

## 2020-12-08 NOTE — THERAPY
Physical Therapy   Daily Treatment     Patient Name: Sebastián Castro  Age:  55 y.o., Sex:  male  Medical Record #: [id number]  Today's Date: 12/8/2020     Precautions: Fall Risk    Assessment    Pt is showing improved strength, but lacks endurance for longer ambulation. PT to follow.     Plan    Continue current treatment plan.    DC Equipment Recommendations: Unable to determine at this time  Discharge Recommendations: Recommend post-acute placement for additional physical therapy services prior to discharge home         Objective       12/08/20 1128   Gait Analysis   Gait Level Of Assist Minimal Assist   Assistive Device Front Wheel Walker   Distance (Feet) 40  (with 1 seated rest)   Bed Mobility    Supine to Sit Supervised  (used rails)   Scooting Minimal Assist   Rolling Minimal Assist to Rt.;Minimum Assist to Lt.   Functional Mobility   Sit to Stand Moderate Assist   Bed, Chair, Wheelchair Transfer Moderate Assist   Transfer Method Stand Pivot   Short Term Goals    Short Term Goal # 1 Pt to move supine to/from eob, no rails, w/ spv in 6 visits to improve fxl indep   Goal Outcome # 1 goal not met   Short Term Goal # 2 Pt to move sit to/from stand w/ spv in 6 visits w/ spv in 6 visits to improve fxl indep   Goal Outcome # 2 Goal not met   Short Term Goal # 3 Pt to ambulate 150 ft w/ spv in 6 visits to improve fxl indep   Goal Outcome # 3 Goal not met   Anticipated Discharge Equipment and Recommendations   DC Equipment Recommendations Unable to determine at this time   Discharge Recommendations Recommend post-acute placement for additional physical therapy services prior to discharge home

## 2020-12-09 LAB
ANION GAP SERPL CALC-SCNC: 7 MMOL/L (ref 7–16)
ANISOCYTOSIS BLD QL SMEAR: ABNORMAL
BASOPHILS # BLD AUTO: 1 % (ref 0–1.8)
BASOPHILS # BLD: 0.1 K/UL (ref 0–0.12)
BUN SERPL-MCNC: 17 MG/DL (ref 8–22)
CALCIUM SERPL-MCNC: 9.5 MG/DL (ref 8.5–10.5)
CHLORIDE SERPL-SCNC: 95 MMOL/L (ref 96–112)
CO2 SERPL-SCNC: 30 MMOL/L (ref 20–33)
COMMENT 1642: NORMAL
CREAT SERPL-MCNC: 0.89 MG/DL (ref 0.5–1.4)
EKG IMPRESSION: NORMAL
EOSINOPHIL # BLD AUTO: 0.92 K/UL (ref 0–0.51)
EOSINOPHIL NFR BLD: 8.8 % (ref 0–6.9)
ERYTHROCYTE [DISTWIDTH] IN BLOOD BY AUTOMATED COUNT: 61.3 FL (ref 35.9–50)
GLUCOSE BLD-MCNC: 117 MG/DL (ref 65–99)
GLUCOSE BLD-MCNC: 128 MG/DL (ref 65–99)
GLUCOSE SERPL-MCNC: 116 MG/DL (ref 65–99)
HCT VFR BLD AUTO: 36.3 % (ref 42–52)
HGB BLD-MCNC: 10.8 G/DL (ref 14–18)
HYPOCHROMIA BLD QL SMEAR: ABNORMAL
IMM GRANULOCYTES # BLD AUTO: 0.07 K/UL (ref 0–0.11)
IMM GRANULOCYTES NFR BLD AUTO: 0.7 % (ref 0–0.9)
LYMPHOCYTES # BLD AUTO: 2.99 K/UL (ref 1–4.8)
LYMPHOCYTES NFR BLD: 28.7 % (ref 22–41)
MAGNESIUM SERPL-MCNC: 1.9 MG/DL (ref 1.5–2.5)
MCH RBC QN AUTO: 25.7 PG (ref 27–33)
MCHC RBC AUTO-ENTMCNC: 29.8 G/DL (ref 33.7–35.3)
MCV RBC AUTO: 86.4 FL (ref 81.4–97.8)
MONOCYTES # BLD AUTO: 0.92 K/UL (ref 0–0.85)
MONOCYTES NFR BLD AUTO: 8.8 % (ref 0–13.4)
MORPHOLOGY BLD-IMP: NORMAL
NEUTROPHILS # BLD AUTO: 5.43 K/UL (ref 1.82–7.42)
NEUTROPHILS NFR BLD: 52 % (ref 44–72)
NRBC # BLD AUTO: 0 K/UL
NRBC BLD-RTO: 0 /100 WBC
NT-PROBNP SERPL IA-MCNC: 4016 PG/ML (ref 0–125)
PHOSPHATE SERPL-MCNC: 4.3 MG/DL (ref 2.5–4.5)
PLATELET # BLD AUTO: 618 K/UL (ref 164–446)
PLATELET BLD QL SMEAR: NORMAL
PMV BLD AUTO: 9.3 FL (ref 9–12.9)
POTASSIUM SERPL-SCNC: 3.8 MMOL/L (ref 3.6–5.5)
RBC # BLD AUTO: 4.2 M/UL (ref 4.7–6.1)
RBC BLD AUTO: PRESENT
SODIUM SERPL-SCNC: 132 MMOL/L (ref 135–145)
TSH SERPL DL<=0.005 MIU/L-ACNC: 15 UIU/ML (ref 0.38–5.33)
WBC # BLD AUTO: 10.4 K/UL (ref 4.8–10.8)

## 2020-12-09 PROCEDURE — 700102 HCHG RX REV CODE 250 W/ 637 OVERRIDE(OP): Performed by: INTERNAL MEDICINE

## 2020-12-09 PROCEDURE — 700102 HCHG RX REV CODE 250 W/ 637 OVERRIDE(OP): Performed by: STUDENT IN AN ORGANIZED HEALTH CARE EDUCATION/TRAINING PROGRAM

## 2020-12-09 PROCEDURE — 770020 HCHG ROOM/CARE - TELE (206)

## 2020-12-09 PROCEDURE — A9270 NON-COVERED ITEM OR SERVICE: HCPCS | Performed by: INTERNAL MEDICINE

## 2020-12-09 PROCEDURE — 83880 ASSAY OF NATRIURETIC PEPTIDE: CPT

## 2020-12-09 PROCEDURE — 700111 HCHG RX REV CODE 636 W/ 250 OVERRIDE (IP): Performed by: STUDENT IN AN ORGANIZED HEALTH CARE EDUCATION/TRAINING PROGRAM

## 2020-12-09 PROCEDURE — 700105 HCHG RX REV CODE 258: Performed by: STUDENT IN AN ORGANIZED HEALTH CARE EDUCATION/TRAINING PROGRAM

## 2020-12-09 PROCEDURE — A9270 NON-COVERED ITEM OR SERVICE: HCPCS | Performed by: STUDENT IN AN ORGANIZED HEALTH CARE EDUCATION/TRAINING PROGRAM

## 2020-12-09 PROCEDURE — 80048 BASIC METABOLIC PNL TOTAL CA: CPT

## 2020-12-09 PROCEDURE — 700111 HCHG RX REV CODE 636 W/ 250 OVERRIDE (IP): Performed by: FAMILY MEDICINE

## 2020-12-09 PROCEDURE — 36415 COLL VENOUS BLD VENIPUNCTURE: CPT

## 2020-12-09 PROCEDURE — 700111 HCHG RX REV CODE 636 W/ 250 OVERRIDE (IP): Performed by: HOSPITALIST

## 2020-12-09 PROCEDURE — 93005 ELECTROCARDIOGRAM TRACING: CPT | Performed by: FAMILY MEDICINE

## 2020-12-09 PROCEDURE — 83735 ASSAY OF MAGNESIUM: CPT

## 2020-12-09 PROCEDURE — 82962 GLUCOSE BLOOD TEST: CPT | Mod: 91

## 2020-12-09 PROCEDURE — 99232 SBSQ HOSP IP/OBS MODERATE 35: CPT | Performed by: FAMILY MEDICINE

## 2020-12-09 PROCEDURE — 97605 NEG PRS WND THER DME<=50SQCM: CPT

## 2020-12-09 PROCEDURE — 84100 ASSAY OF PHOSPHORUS: CPT

## 2020-12-09 PROCEDURE — 700111 HCHG RX REV CODE 636 W/ 250 OVERRIDE (IP): Performed by: INTERNAL MEDICINE

## 2020-12-09 PROCEDURE — 85025 COMPLETE CBC W/AUTO DIFF WBC: CPT

## 2020-12-09 PROCEDURE — 93010 ELECTROCARDIOGRAM REPORT: CPT | Performed by: INTERNAL MEDICINE

## 2020-12-09 PROCEDURE — 84443 ASSAY THYROID STIM HORMONE: CPT

## 2020-12-09 RX ORDER — LISINOPRIL 10 MG/1
10 TABLET ORAL
Status: DISCONTINUED | OUTPATIENT
Start: 2020-12-10 | End: 2020-12-14

## 2020-12-09 RX ORDER — METOPROLOL SUCCINATE 50 MG/1
50 TABLET, EXTENDED RELEASE ORAL
Status: DISCONTINUED | OUTPATIENT
Start: 2020-12-10 | End: 2020-12-09

## 2020-12-09 RX ORDER — FUROSEMIDE 10 MG/ML
20 INJECTION INTRAMUSCULAR; INTRAVENOUS
Status: DISCONTINUED | OUTPATIENT
Start: 2020-12-09 | End: 2020-12-11

## 2020-12-09 RX ORDER — METOPROLOL SUCCINATE 50 MG/1
50 TABLET, EXTENDED RELEASE ORAL 2 TIMES DAILY
Status: DISCONTINUED | OUTPATIENT
Start: 2020-12-10 | End: 2020-12-17 | Stop reason: HOSPADM

## 2020-12-09 RX ADMIN — INSULIN GLARGINE 15 UNITS: 100 INJECTION, SOLUTION SUBCUTANEOUS at 05:47

## 2020-12-09 RX ADMIN — Medication 400 MG: at 05:36

## 2020-12-09 RX ADMIN — METOPROLOL SUCCINATE 75 MG: 50 TABLET, EXTENDED RELEASE ORAL at 05:36

## 2020-12-09 RX ADMIN — CEFTRIAXONE 2 G: 2 INJECTION, POWDER, FOR SOLUTION INTRAMUSCULAR; INTRAVENOUS at 05:56

## 2020-12-09 RX ADMIN — RISPERIDONE 1 MG: 1 TABLET ORAL at 18:04

## 2020-12-09 RX ADMIN — FUROSEMIDE 20 MG: 10 INJECTION, SOLUTION INTRAMUSCULAR; INTRAVENOUS at 18:03

## 2020-12-09 RX ADMIN — HYDROCODONE BITARTRATE AND ACETAMINOPHEN 2 TABLET: 5; 325 TABLET ORAL at 10:32

## 2020-12-09 RX ADMIN — FLUCONAZOLE 400 MG: 200 TABLET ORAL at 05:37

## 2020-12-09 RX ADMIN — LISINOPRIL 20 MG: 20 TABLET ORAL at 05:37

## 2020-12-09 RX ADMIN — FAMOTIDINE 20 MG: 20 TABLET, FILM COATED ORAL at 05:37

## 2020-12-09 RX ADMIN — LEVOTHYROXINE SODIUM 112 MCG: 112 TABLET ORAL at 05:37

## 2020-12-09 RX ADMIN — RISPERIDONE 0.5 MG: 1 TABLET ORAL at 05:37

## 2020-12-09 RX ADMIN — AMIODARONE HYDROCHLORIDE 200 MG: 200 TABLET ORAL at 05:36

## 2020-12-09 RX ADMIN — SPIRONOLACTONE 25 MG: 50 TABLET ORAL at 05:37

## 2020-12-09 RX ADMIN — FUROSEMIDE 20 MG: 10 INJECTION, SOLUTION INTRAMUSCULAR; INTRAVENOUS at 05:36

## 2020-12-09 RX ADMIN — PROCHLORPERAZINE EDISYLATE 10 MG: 5 INJECTION INTRAMUSCULAR; INTRAVENOUS at 12:33

## 2020-12-09 RX ADMIN — HYDROCODONE BITARTRATE AND ACETAMINOPHEN 2 TABLET: 5; 325 TABLET ORAL at 01:13

## 2020-12-09 RX ADMIN — CETIRIZINE HYDROCHLORIDE 10 MG: 10 TABLET, FILM COATED ORAL at 05:36

## 2020-12-09 RX ADMIN — MODAFINIL 100 MG: 100 TABLET ORAL at 05:36

## 2020-12-09 RX ADMIN — HYDROCODONE BITARTRATE AND ACETAMINOPHEN 2 TABLET: 5; 325 TABLET ORAL at 22:57

## 2020-12-09 RX ADMIN — VANCOMYCIN HYDROCHLORIDE 1000 MG: 500 INJECTION, POWDER, LYOPHILIZED, FOR SOLUTION INTRAVENOUS at 12:16

## 2020-12-09 RX ADMIN — Medication 400 MG: at 18:04

## 2020-12-09 ASSESSMENT — ENCOUNTER SYMPTOMS
VOMITING: 0
DIARRHEA: 0
DIZZINESS: 0
BLURRED VISION: 0
CHILLS: 0
SENSORY CHANGE: 0
FLANK PAIN: 0
FEVER: 0
ABDOMINAL PAIN: 0
SHORTNESS OF BREATH: 0
NECK PAIN: 0
COUGH: 0
WHEEZING: 0
WEAKNESS: 1
NERVOUS/ANXIOUS: 0
DIAPHORESIS: 0
NAUSEA: 0
BACK PAIN: 0
MYALGIAS: 0
SPEECH CHANGE: 0
HEARTBURN: 0
FOCAL WEAKNESS: 0
PALPITATIONS: 0
SORE THROAT: 0
HEADACHES: 0

## 2020-12-09 ASSESSMENT — PAIN DESCRIPTION - PAIN TYPE
TYPE: ACUTE PAIN
TYPE: ACUTE PAIN;SURGICAL PAIN
TYPE: ACUTE PAIN

## 2020-12-09 NOTE — PROGRESS NOTES
Pharmacy Kinetics 55 y.o. male on vancomycin day # 22 (2020)    Currently on Vancomycin  1000mg iv q24hr  Provider specified end date: 20     Indication for Treatment: IAI     Pertinent history per medical record: Admitted on 11/3/2020 for  concern of abdominal pain after surgical intervention. Concern for subsequent colonic perforation and possible abscess. ID and surgery consulted.     Other antibiotics: Ceftriaxone 2 grams q24H, fluconazole 400 mg q24H     Allergies: Daptomycin, Pcn [penicillins], Unasyn [ampicillin-sulbactam sodium], and Vancomycin   List concerns for renal function: BMI 58.16 kg/m2; albumin <3; abnormal LFTs     Pertinent cultures to date:   20 - wound (vj-splenic drain): E faecalis  11/10/20 - colon perforation: E faecalis; C albicans     MRSA nares swab if pneumonia is a concern (ordered/positive/negative/n-a): N/A       Recent Labs     20  0708 20  0625   WBC 9.1 10.4   NEUTSPOLYS 54.30 52.00     Recent Labs     20  0708 20  0625   BUN 16 17   CREATININE 0.91 0.89     No results for input(s): VANCOTROUGH, VANCOPEAK, VANCORANDOM in the last 72 hours.    Intake/Output Summary (Last 24 hours) at 2020 0941  Last data filed at 2020 0806  Gross per 24 hour   Intake 780 ml   Output 2831 ml   Net -2051 ml      /79   Pulse 93   Temp 36.1 °C (97 °F) (Temporal)   Resp 17   Ht 1.829 m (6')   Wt (!) 194.5 kg (428 lb 12.7 oz)   SpO2 97%  Temp (24hrs), Av.3 °C (97.3 °F), Min:36.1 °C (97 °F), Max:36.6 °C (97.8 °F)      A/P   1. Vancomycin dose change: Not indicated at this time- continue with vancomycin 1000 mg q24H  2. Next vancomycin level: Not clinically indicated at this time- unlikely to need another trough unless therapy is extended or renal function declines  3. Goal trough: 10-15 mcg/mL  4. Comments: Scr stable. Plan for antibiotics through 20. No need for vancomycin trough unless therapy is extended or renal function worsens  as patient's most recent vancomycin trough on 12/5 was therapeutic. Per protocol, vancomycin troughs required once weekly or more if clinically warranted. Pharmacy will CTM.    Bee Ramsey, PharmD

## 2020-12-09 NOTE — DISCHARGE PLANNING
Agency/Facility Name: [facility]  Spoke To: Courtney  Outcome: Pt. Declined, facility has no bariatric beds     @1342  Agency/Facility Name: [facility]  Spoke To: Sathish  Outcome: Pt. Accepted facility still cannot take admissions but when they open pt. Will be able to transfer pending xavier bed.  JASMEET German notified

## 2020-12-09 NOTE — WOUND TEAM
[facility]  Inpatient Services  Wound and Skin Care Progress Note    Admission Date: 11/3/2020     Last order of IP CONSULT TO WOUND CARE was found on 11/16/2020 from Hospital Encounter on 11/3/2020     HPI, PMH, SH: Reviewed    Unit where seen by Wound Team: [id number]     WOUND CONSULT/FOLLOW UP RELATED TO:  Scheduled abdominal NPWT dressing change.  See separate Ostomy note    Self Report / Pain Level:  Premed with PO. May need IV next change, periwound very painful     OBJECTIVE:  NPWT intact. Colostomy intact. On bariatric DARYL.     WOUND TYPE, LOCATION, CHARACTERISTICS (Pressure Injuries: location, stage, POA or date identified)  Wound 11/10/20 Full Thickness Wound Abdomen Midline wound vac (Active)   Wound Image      Site Assessment Red;Pink;Granulation tissue    Periwound Assessment Painful;Maceration;Red    Margins Attached edges;Undefined edges    Closure Secondary intention    Drainage Amount Small    Drainage Description Serosanguineous    Treatments Cleansed;Site care;Tape change    Wound Cleansing Approved Wound Cleanser    Periwound Protectant Hydrocolloid;Drape;Skin Protectant Wipes to Periwound    Dressing Cleansing/Solutions Not Applicable    Dressing Options Wound Vac    Dressing Changed Changed    Dressing Status Clean;Dry;Intact    Dressing Change/Treatment Frequency Monday, Wednesday, Friday, and As Needed    NEXT Dressing Change/Treatment Date 12/11/20    NEXT Weekly Photo (Inpatient Only) 12/11/20    Number of Staples Removed 10    Non-staged Wound Description Full thickness    Number of days: 29           Negative Pressure Wound Therapy 11/10/20 Surgical Abdomen Midline (Active)   Vacuum Serial Number [id number]    NPWT Pump Mode / Pressure Setting Ulta;Continuous;125 mmHg    Dressing Type Medium;Black Foam (Regular)    Number of Foam Pieces Used 2    Canister Changed No    Output (mL) 0 mL    NEXT Dressing Change/Treatment Date 12/11/20    WOUND NURSE ONLY - Time Spent with  Patient (mins) 75                      Vascular:    SHAYAN:   No results found.    Lab Values:    Lab Results   Component Value Date/Time    WBC 10.4 12/09/2020 06:25 AM    RBC 4.20 (L) 12/09/2020 06:25 AM    HEMOGLOBIN 10.8 (L) 12/09/2020 06:25 AM    HEMATOCRIT 36.3 (L) 12/09/2020 06:25 AM    CREACTPROT 7.52 (H) 11/29/2020 01:25 AM    SEDRATEWES 13 10/23/2018 08:02 AM    HBA1C 7.8 (H) 11/10/2020 03:31 AM        Culture Results show:  Recent Results (from the past 720 hour(s))   CULTURE WOUND W/ GRAM STAIN    Collection Time: 11/25/20  4:52 PM    Specimen: Wound   Result Value Ref Range    Significant Indicator POS (POS)     Source WND     Site Joya-splenic drain     Culture Result (A)      Growth noted after further incubation, see below for  organism identification.      Gram Stain Result Moderate WBCs.  Rare Gram positive cocci.       Culture Result Enterococcus faecalis  Rare growth   (A)        Susceptibility    Enterococcus faecalis - ISAMAR     Daptomycin 4 Sensitive mcg/mL     Ampicillin <=2 Sensitive mcg/mL     Gent Synergy <=500 Sensitive mcg/mL     Vancomycin 1 Sensitive mcg/mL     Penicillin 2 Sensitive mcg/mL       INTERVENTIONS BY WOUND TEAM:  Chart and images reviewed. Discussed with bedside RN. This RN in to assess patient. NPWT dressing removed, no retained foam. Wound cleansed with wound cleanser and gauze. Patient with very high amounts of pain with periwound cleansing and touch. Bedside RN paged Surgeon to verify okay for removal of staples, per surgeon okay for this wound RN to remove staples, 10 staples removed. Periwound prepped with no-sting skin prep, and hydrocolloid thin on denuded and macerated periwound (including bridge between superior and inferior wound), drape on rest of periwound. One piece of half thickness spiral was placed into the base of the wound base and into the 1200 tunnel, and the same piece extended down over the bridge onto the inferior wound bed. Then applied one more piece  of half thickness over the superior wound to fill remaining wound depth. Secured with drape ape and trak pad was applied. Secured with more drape. Suction obtained at 125mmhg, no leaks present. Ostomy changed (see separate note). Updated bedside RN Sheron.     Interdisciplinary consultation: Patient, Bedside RN (Sheron), wound RN Heather    EVALUATION / RATIONALE FOR TREATMENT: Wound depth continues to decrease. Granulation tissue present. Continued NPWT to manage drainage and assist in wound closure. Staples were due to be removed, okay per MD, appeared approximated in those sections after staples removed. Periwound with some maceration/denution present, applied hydrocolloid thin to protect and help heal. Consider using dermatac tape next change due to patients high amounts of pain.         Goals: Steady decrease in wound area and depth weekly.    NURSING PLAN OF CARE ORDERS (X):    Dressing changes: See Dressing Care orders: X  Skin care: See Skin Care orders: X  RN Prevention Protocol: X  Rectal tube care: See Rectal Tube Care orders:   Other orders:       WOUND TEAM PLAN OF CARE:   Dressing changes by wound team:                   Follow up 3 times weekly:                NPWT change 3 times weekly: X    Follow up 1-2 times weekly:      Follow up Bi-Monthly:                   Follow up as needed:       Other (explain):     Anticipated discharge plans:   LTACH:        SNF/Rehab:  X                Home Health Care:           Outpatient Wound Center:            Self Care:

## 2020-12-09 NOTE — PROGRESS NOTES
[facility] Medicine Daily Progress Note    Date of Service  12/8/2020    Chief Complaint  55 y.o. male admitted 11/3/2020 with perforated colon.    Hospital Course  Admitted as a transfer, was noted to have splenic fluid collection.  Surgery was consulted on the case, CT-guided drain was done.  Further work-up revealed that he had a perforated colon.  Patient underwent segmental colectomy, colostomy creation, mobilization of splenic flexure, wound VAC placement, irrigation debridement of flank wound on 11/10/2020.  His culture showed Enterococcus and Candida.  His blood culture in 1 bottle showed Streptococcus viridans.  Infectious disease was consulted on the case. On  admission he also had acute kidney injury and hyperkalemia, nephrology was consulted on the case and these have resolved.  Has no history of congestive heart failure and cardiomyopathy, chronic atrial fibrillation, had episodes of rapid ventricular rate and cardiology was consulted on the case.  He also had an episode of suicidal ideation and he was placed on legal hold.  Psychiatry was consulted the case, he has been taken off legal hold at this point.    Interval Problem Update  Perforated colon - pain controlled, tolerating regular diet  Abdominal abscess -culture showed Enterococcus and Candida  Bacteremia -after because viridans, echocardiogram negative, repeat cultures negative  Diabetes - 110-150  HTN - sbp 120-140    Consultants/Specialty  Surgery  ID  Nephrology  Carddiology  Psychiatry    Code Status  Full Code    Disposition  Off legal hold  SNF    Review of Systems  Review of Systems   Constitutional: Positive for malaise/fatigue. Negative for chills, diaphoresis and fever.   HENT: Negative for congestion, hearing loss and sore throat.    Eyes: Negative for blurred vision.   Respiratory: Negative for cough, shortness of breath and wheezing.    Cardiovascular: Negative for chest pain, palpitations and leg swelling.   Gastrointestinal:  Negative for abdominal pain, diarrhea, heartburn, nausea and vomiting.   Genitourinary: Negative for dysuria, flank pain and hematuria.   Musculoskeletal: Negative for back pain, joint pain, myalgias and neck pain.   Skin: Negative for rash.   Neurological: Positive for weakness. Negative for dizziness, sensory change, speech change, focal weakness and headaches.   Psychiatric/Behavioral: Negative for suicidal ideas. The patient is not nervous/anxious.         Physical Exam  Temp:  [36.1 °C (96.9 °F)-36.3 °C (97.4 °F)] 36.3 °C (97.4 °F)  Pulse:  [] 85  Resp:  [18-20] 20  BP: (122-144)/(69-83) 122/83  SpO2:  [94 %-96 %] 94 %    Physical Exam  Vitals signs and nursing note reviewed.   Constitutional:       Appearance: He is obese.   HENT:      Head: Normocephalic and atraumatic.      Nose: No congestion.      Mouth/Throat:      Mouth: Mucous membranes are moist.   Eyes:      Extraocular Movements: Extraocular movements intact.      Conjunctiva/sclera: Conjunctivae normal.      Pupils: Pupils are equal, round, and reactive to light.   Neck:      Musculoskeletal: No muscular tenderness.   Cardiovascular:      Rate and Rhythm: Normal rate. Rhythm irregularly irregular.   Pulmonary:      Effort: Pulmonary effort is normal.      Breath sounds: Normal breath sounds.   Abdominal:      General: There is no distension.      Tenderness: There is no abdominal tenderness. There is no guarding or rebound.      Comments: Wound vac at midline  Ostomy  Drain at LUQ   Musculoskeletal:      Right lower leg: Edema present.      Left lower leg: Edema present.   Lymphadenopathy:      Cervical: No cervical adenopathy.   Skin:     General: Skin is warm and dry.   Neurological:      General: No focal deficit present.      Mental Status: He is alert and oriented to person, place, and time.      Cranial Nerves: No cranial nerve deficit.   Psychiatric:         Thought Content: Thought content does not include suicidal ideation. Thought  content does not include suicidal plan.         Fluids    Intake/Output Summary (Last 24 hours) at 12/8/2020 1642  Last data filed at 12/8/2020 1300  Gross per 24 hour   Intake 720 ml   Output 2981 ml   Net -2261 ml       Laboratory  Recent Labs     12/06/20  0527 12/08/20  0708   WBC 10.1 9.1   RBC 3.65* 3.92*   HEMOGLOBIN 9.3* 10.3*   HEMATOCRIT 31.5* 33.4*   MCV 86.3 85.2   MCH 25.5* 26.3*   MCHC 29.5* 30.8*   RDW 59.7* 59.7*   PLATELETCT 608* 576*   MPV 9.2 8.9*     Recent Labs     12/06/20  0527 12/08/20  0708   SODIUM 134* 130*   POTASSIUM 4.2 3.8   CHLORIDE 98 96   CO2 29 29   GLUCOSE 125* 142*   BUN 17 16   CREATININE 0.91 0.91   CALCIUM 8.8 9.3                   Imaging  CT-ABDOMEN-PELVIS WITH   Final Result      1.  Interval placement of a surgical drain into a splenic fluid collection. The fluid collection has decreased in size with a small amount of residual fluid seen surrounding the percutaneous catheter drain tip.      2.  No evidence of bowel obstruction.      3.  Left lower quadrant ostomy site.      4.  Bibasilar atelectasis with small bilateral pleural effusions.      5.  Gallstones.      6.  Fatty liver.      7.  Anasarca.      8.  Small amount of ascites.      IR-MIDLINE CATHETER INSERTION WO GUIDANCE > AGE 3   Final Result                  Ultrasound-guided midline placement performed by qualified nursing staff    as above.          CT-DRAIN-PERITONEAL   Final Result      Successful splenic abscess drainage tube placement.      Plan: Twice daily flushes with 10 mL of sterile saline. Monitor outputs.         CT-ABDOMEN-PELVIS WITH   Final Result      1.  Intraparenchymal collection in the anterior aspect of the spleen has increased in size from the prior exam.      2.  Small amount of free fluid in the pelvis has increased from the prior exam.      3.  Small bilateral pleural effusions layer posteriorly, left greater than right, slightly decreased from the prior exam. Adjacent airspace disease  is likely atelectasis.      4.  Cholelithiasis.      5.  Right renal scarring.      [id number] VIEW   Final Result      1.  No evidence of bowel obstruction.   2.  Postoperative changes as described.      CT-ABDOMEN-PELVIS W/O   Final Result      Postsurgical changes with a left abdominal stoma. Parastomal densities may be related to postsurgical hematoma measuring up to 2.8 x 2.1 cm.      Small amount of free fluid in the abdomen and pelvis.      Collection along the anterior spleen measures approximately 2.9 x 4.3 cm is decreased in size compared to the prior examination. Evaluation is limited by lack of intravenous contrast.      Third spacing.      Trace free intraperitoneal air is likely related to recent surgery.      Moderate left and small right pleural effusions with overlying atelectasis/consolidation.      Cortical scarring of the right kidney.      Cholelithiasis.         DX-CHEST-PORTABLE (1 VIEW)   Final Result      1.  Bilateral airspace and interstitial infiltrates.      2.  Cardiomegaly.      US-RENAL   Final Result    Examination was limited due to poor sound penetration related to body habitus.   Grossly normal appearance of the kidneys.      EC-ECHOCARDIOGRAM COMPLETE W/ CONT   Final Result      DX-CHEST-PORTABLE (1 VIEW)   Final Result      No pneumothorax identified following right subclavian line placement      Worsening atelectasis with consolidation at the bases not excluded      Stable cardiac silhouette enlargement      CT-ABDOMEN-PELVIS WITH   Final Result      Interval decrease in size of perisplenic abscess. Pigtail catheter now in place      Abscess has likely fistulous communication with the abutting splenic flexure of the colon. Colon is otherwise normal in appearance      Mild hepatosplenomegaly      Decreasing small left pleural effusion      Subacute anterior wedge compression fractures in the caudal thoracic spine      CT-DRAIN-PERITONEAL   Final Result      1.  CT guided  perisplenic abscess catheter drainage.   2.  The current plan is to obtain a follow-up CT in 5-7 days..      OUTSIDE IMAGES-CT CHEST   Final Result      OUTSIDE IMAGES-CT ABDOMEN /PELVIS   Final Result      CT-ABDOMEN-PELVIS WITH   Final Result   Addendum 1 of 1   Addendum:   A prior noncontrast CT of the abdomen and pelvis from [facility] dated 10/19/2020 was made available for comparison. On the    prior study a perisplenic/subcapsular fluid collection was seen however    did not contain air at that time and    there was no discrete evidence of communication with the transverse colon.    Communication with splenic flexure of the colon and air within the    collection are new from the prior study.      Final      DX-CHEST-PORTABLE (1 VIEW)   Final Result      Slight interval interval improvement in congestive heart failure.           Assessment/Plan  * Spontaneous perforation of colon (HCC)- (present on admission)  Assessment & Plan  Segmental colectomy, colostomy creation, mobilization of splenic flexure, wound VAC placement, irrigation debridement of flank wound 11/10/2020  IV vancomycin, Rocephin, Diflucan  Wound care  Pain control    Intra-abdominal abscess (HCC)- (present on admission)  Assessment & Plan  CT-guided drain 11/25/2020  CT guided Drain Placement 11/3/2020  IV vancomycin, Rocephin, Diflucan    SRUTHI (acute kidney injury) (HCC)  Assessment & Plan  Follow BMP    Sepsis (HCC)  Assessment & Plan  Secondary to perforation of colon, splenic abscess    Anxiety and depression- (present on admission)  Assessment & Plan  Risperdal, trazodone    Hypothyroidism- (present on admission)  Assessment & Plan  started on Levothyroxine 112 mcg 11/21/2020  Check TSH to adjust dose    Prolonged QT interval- (present on admission)  Assessment & Plan  Check EKG    NICM (nonischemic cardiomyopathy) (HCC)- (present on admission)  Assessment & Plan  Toprol, lisinopril, Lasix, Aldactone    Suicidal  ideation  Assessment & Plan  Off Legal hold    Chronic systolic heart failure (HCC)- (present on admission)  Assessment & Plan  Toprol, lisinopril, Lasix, Aldactone    Chronic atrial fibrillation- (present on admission)  Assessment & Plan  Amiodarone, Toprol  Was not on anticoagulation    Type 2 diabetes mellitus with hyperglycemia, without long-term current use of insulin (HCC)- (present on admission)  Assessment & Plan  Lantus, SSI      Hypomagnesemia- (present on admission)  Assessment & Plan  Check level    Hypokalemia- (present on admission)  Assessment & Plan  Follow bmp    Elevated digoxin level  Assessment & Plan  Hold digoxin    Essential hypertension- (present on admission)  Assessment & Plan  Toprol, lisinopril, Aldactone         VTE prophylaxis: SCD

## 2020-12-09 NOTE — WOUND TEAM
"[facility] Wound & Ostomy Care  Inpatient Services  New Ostomy Management & Teaching     HPI:  Reviewed  PMH: Reviewed   SH: Reviewed      Objective: appliance intact.     Colostomy 11/10/20 Transverse LUQ (Active)   Wound Image   11/30/20 0900   Stomal Appliance Assessment Clean;Dry;Intact;Changed 12/09/20 1100   Stoma Assessment Red 12/09/20 1100   Stoma Shape Budded Less Than One Inch;Oval 12/09/20 1100   Stoma Size (in) 1.25 12/07/20 1200   Peristomal Assessment Excoriation 12/09/20 1100   Mucocutaneous Junction Intact 12/09/20 1100   Treatment Appliance Changed;Bag Change;Cleansed with water/washcloth;Crusted with stoma powder;Site care 12/09/20 1100   Peristomal Protectant No Sting Skin Prep;Stoma Powder;Paste Ring 12/09/20 1100   Stomal Appliance Paste Ring, 2\";2 1/4\" (57mm) CTF 12/09/20 1100   Output (mL) 250 mL 12/09/20 1100   Output Color Brown 12/09/20 1100   WOUND RN ONLY - Stomal Appliance  2 Piece 12/09/20 1100   Appliance (Pouch) # [id number] 12/04/20 1526   Appliance Brand Daisy 12/04/20 1526   Appliance Supplier [employer] 12/04/20 1526   Secure Start completed Yes 12/04/20 1526   Ostomy Care Resources Provided [employer] Tip Sheet 12/04/20 1526   WOUND NURSE ONLY - Time Spent with Patient (mins) 30 12/09/20 1100         Ostomy Appliance (type and size):  2 piece 2 1/4\" with paste ring, supplies left outside room due to legal hold     Interventions:  Previous appliance removed, cleansed skin with warm wash cloth, dried. New template made. Ostomy RN cut barrier to fit. Peristomal skin still slightly red. No sting applied crusted with ostomy powder.This RN applied paste ring to barrier and then applied barrier to skin. Used friction/heat to adhered barrier to skin. Pouch attached to barrier and pouch end closed.                 Pt education: No education today, pt given pain medication due to wound vac change.     Evaluation:  Peristomal skin is pink/red no sting in use with crusting.  Pt unable to perform most " ostomy care alone. Patient likely to discharge to SNF or Rehab, currently on a legal hold with sitter at bedside. Stoma is viable and producing adequate amounts of stool.      Plan: Ostomy nurses to continue to follow for ostomy needs and teaching      Anticipated discharge needs: Supplies, supplier information, possible HH or outpatient ostomy clinic

## 2020-12-09 NOTE — ASSESSMENT & PLAN NOTE
Drain removed 12/15/2020  CT-guided drain 11/25/2020  CT guided Drain Placement 11/3/2020  Finished course of IV Vancomycin, Rocephin, Diflucan

## 2020-12-09 NOTE — PROGRESS NOTES
[facility] Medicine Daily Progress Note    Date of Service  12/9/2020    Chief Complaint  55 y.o. male admitted 11/3/2020 with perforated colon.    Hospital Course  Admitted as a transfer, was noted to have splenic fluid collection.  Surgery was consulted on the case, CT-guided drain was done.  Further work-up revealed that he had a perforated colon.  Patient underwent segmental colectomy, colostomy creation, mobilization of splenic flexure, wound VAC placement, irrigation debridement of flank wound on 11/10/2020.  His culture showed Enterococcus and Candida.  His blood culture in 1 bottle showed Streptococcus viridans.  Infectious disease was consulted on the case. On  admission he also had acute kidney injury and hyperkalemia, Nephrology was consulted on the case and these have resolved. He has known history of congestive heart failure and cardiomyopathy, chronic atrial fibrillation, had episodes of rapid ventricular rate and Cardiology was consulted on the case.  He also had an episode of suicidal ideation and he was placed on legal hold.  Psychiatry was consulted on the case, he has been taken off legal hold at this point.    Interval Problem Update  Perforated colon - pain controlled, tolerating regular diet  Abdominal abscess - culture showed Enterococcus and Candida  Bacteremia - Streptococcus viridans, Echocardiogram and repeat cultures negative  Diabetes - 110-140  HTN - sbp 100-120  CHF/CM - probnp 4000  Low magnesium    Consultants/Specialty  Surgery  ID  Nephrology  Carddiology  Psychiatry    Code Status  Full Code    Disposition  Off legal hold  SNF    Review of Systems  Review of Systems   Constitutional: Positive for malaise/fatigue. Negative for chills, diaphoresis and fever.   HENT: Negative for congestion, hearing loss and sore throat.    Eyes: Negative for blurred vision.   Respiratory: Negative for cough, shortness of breath and wheezing.    Cardiovascular: Negative for chest pain, palpitations and  leg swelling.   Gastrointestinal: Negative for abdominal pain, diarrhea, heartburn, nausea and vomiting.   Genitourinary: Negative for dysuria, flank pain and hematuria.   Musculoskeletal: Negative for back pain, joint pain, myalgias and neck pain.   Skin: Negative for rash.   Neurological: Positive for weakness. Negative for dizziness, sensory change, speech change, focal weakness and headaches.   Psychiatric/Behavioral: Negative for suicidal ideas. The patient is not nervous/anxious.         Physical Exam  Temp:  [36.1 °C (97 °F)-36.6 °C (97.8 °F)] 36.1 °C (97 °F)  Pulse:  [54-93] 93  Resp:  [17-20] 17  BP: ()/(63-82) 122/79  SpO2:  [92 %-97 %] 97 %    Physical Exam  Vitals signs and nursing note reviewed.   Constitutional:       Appearance: He is obese.   HENT:      Head: Normocephalic and atraumatic.      Nose: No congestion.      Mouth/Throat:      Mouth: Mucous membranes are moist.   Eyes:      Extraocular Movements: Extraocular movements intact.      Conjunctiva/sclera: Conjunctivae normal.      Pupils: Pupils are equal, round, and reactive to light.   Neck:      Musculoskeletal: No muscular tenderness.   Cardiovascular:      Rate and Rhythm: Normal rate. Rhythm irregularly irregular.   Pulmonary:      Effort: Pulmonary effort is normal.      Breath sounds: Normal breath sounds.   Abdominal:      General: There is no distension.      Tenderness: There is no abdominal tenderness. There is no guarding or rebound.      Comments: Wound vac at midline  Ostomy  Drain at LUQ   Musculoskeletal:      Right lower leg: Edema present.      Left lower leg: Edema present.   Lymphadenopathy:      Cervical: No cervical adenopathy.   Skin:     General: Skin is warm and dry.   Neurological:      General: No focal deficit present.      Mental Status: He is alert and oriented to person, place, and time.      Cranial Nerves: No cranial nerve deficit.   Psychiatric:         Thought Content: Thought content does not include  suicidal ideation. Thought content does not include suicidal plan.         Fluids    Intake/Output Summary (Last 24 hours) at 12/9/2020 0924  Last data filed at 12/9/2020 0806  Gross per 24 hour   Intake 540 ml   Output 3181 ml   Net -2641 ml       Laboratory  Recent Labs     12/08/20  0708 12/09/20  0625   WBC 9.1 10.4   RBC 3.92* 4.20*   HEMOGLOBIN 10.3* 10.8*   HEMATOCRIT 33.4* 36.3*   MCV 85.2 86.4   MCH 26.3* 25.7*   MCHC 30.8* 29.8*   RDW 59.7* 61.3*   PLATELETCT 576* 618*   MPV 8.9* 9.3     Recent Labs     12/08/20  0708 12/09/20  0625   SODIUM 130* 132*   POTASSIUM 3.8 3.8   CHLORIDE 96 95*   CO2 29 30   GLUCOSE 142* 116*   BUN 16 17   CREATININE 0.91 0.89   CALCIUM 9.3 9.5                   Imaging  CT-ABDOMEN-PELVIS WITH   Final Result      1.  Interval placement of a surgical drain into a splenic fluid collection. The fluid collection has decreased in size with a small amount of residual fluid seen surrounding the percutaneous catheter drain tip.      2.  No evidence of bowel obstruction.      3.  Left lower quadrant ostomy site.      4.  Bibasilar atelectasis with small bilateral pleural effusions.      5.  Gallstones.      6.  Fatty liver.      7.  Anasarca.      8.  Small amount of ascites.      IR-MIDLINE CATHETER INSERTION WO GUIDANCE > AGE 3   Final Result                  Ultrasound-guided midline placement performed by qualified nursing staff    as above.          CT-DRAIN-PERITONEAL   Final Result      Successful splenic abscess drainage tube placement.      Plan: Twice daily flushes with 10 mL of sterile saline. Monitor outputs.         CT-ABDOMEN-PELVIS WITH   Final Result      1.  Intraparenchymal collection in the anterior aspect of the spleen has increased in size from the prior exam.      2.  Small amount of free fluid in the pelvis has increased from the prior exam.      3.  Small bilateral pleural effusions layer posteriorly, left greater than right, slightly decreased from the prior  exam. Adjacent airspace disease is likely atelectasis.      4.  Cholelithiasis.      5.  Right renal scarring.      [id number] VIEW   Final Result      1.  No evidence of bowel obstruction.   2.  Postoperative changes as described.      CT-ABDOMEN-PELVIS W/O   Final Result      Postsurgical changes with a left abdominal stoma. Parastomal densities may be related to postsurgical hematoma measuring up to 2.8 x 2.1 cm.      Small amount of free fluid in the abdomen and pelvis.      Collection along the anterior spleen measures approximately 2.9 x 4.3 cm is decreased in size compared to the prior examination. Evaluation is limited by lack of intravenous contrast.      Third spacing.      Trace free intraperitoneal air is likely related to recent surgery.      Moderate left and small right pleural effusions with overlying atelectasis/consolidation.      Cortical scarring of the right kidney.      Cholelithiasis.         DX-CHEST-PORTABLE (1 VIEW)   Final Result      1.  Bilateral airspace and interstitial infiltrates.      2.  Cardiomegaly.      US-RENAL   Final Result    Examination was limited due to poor sound penetration related to body habitus.   Grossly normal appearance of the kidneys.      EC-ECHOCARDIOGRAM COMPLETE W/ CONT   Final Result      DX-CHEST-PORTABLE (1 VIEW)   Final Result      No pneumothorax identified following right subclavian line placement      Worsening atelectasis with consolidation at the bases not excluded      Stable cardiac silhouette enlargement      CT-ABDOMEN-PELVIS WITH   Final Result      Interval decrease in size of perisplenic abscess. Pigtail catheter now in place      Abscess has likely fistulous communication with the abutting splenic flexure of the colon. Colon is otherwise normal in appearance      Mild hepatosplenomegaly      Decreasing small left pleural effusion      Subacute anterior wedge compression fractures in the caudal thoracic spine      CT-DRAIN-PERITONEAL   Final  Result      1.  CT guided perisplenic abscess catheter drainage.   2.  The current plan is to obtain a follow-up CT in 5-7 days..      OUTSIDE IMAGES-CT CHEST   Final Result      OUTSIDE IMAGES-CT ABDOMEN /PELVIS   Final Result      CT-ABDOMEN-PELVIS WITH   Final Result   Addendum 1 of 1   Addendum:   A prior noncontrast CT of the abdomen and pelvis from [facility] dated 10/19/2020 was made available for comparison. On the    prior study a perisplenic/subcapsular fluid collection was seen however    did not contain air at that time and    there was no discrete evidence of communication with the transverse colon.    Communication with splenic flexure of the colon and air within the    collection are new from the prior study.      Final      DX-CHEST-PORTABLE (1 VIEW)   Final Result      Slight interval interval improvement in congestive heart failure.           Assessment/Plan  * Spontaneous perforation of colon (HCC)- (present on admission)  Assessment & Plan  Segmental colectomy, colostomy creation, mobilization of splenic flexure, wound VAC placement, irrigation and debridement of flank wound 11/10/2020  IV vancomycin, Rocephin, Diflucan until 12/11/2020  Wound care  Pain control    Gram-positive bacteremia- (present on admission)  Assessment & Plan  IV Vancomycin and Rocephin until 12/11/2020    Intra-abdominal abscess (HCC)- (present on admission)  Assessment & Plan  CT-guided drain 11/25/2020  CT guided Drain Placement 11/3/2020  IV vancomycin, Rocephin, Diflucan until 12/11/2020    SRUTHI (acute kidney injury) (HCC)  Assessment & Plan  Follow BMP    Sepsis (HCC)  Assessment & Plan  Secondary to perforation of colon, splenic abscess    Anxiety and depression- (present on admission)  Assessment & Plan  Risperdal, Trazodone    Hypothyroidism- (present on admission)  Assessment & Plan  Levothyroxine 112 mcg   Check TSH on 12/21/2020    Prolonged QT interval- (present on admission)  Assessment &  Plan  Cardiac monitor  follow EKG    NICM (nonischemic cardiomyopathy) ([facility])- (present on admission)  Assessment & Plan  Toprol, lisinopril, Lasix, Aldactone    Suicidal ideation  Assessment & Plan  Off Legal hold    Chronic systolic heart failure (HCC)- (present on admission)  Assessment & Plan  Toprol, lisinopril, Aldactone  Increase IV Lasix 20 mg twice daily    Chronic atrial fibrillation- (present on admission)  Assessment & Plan  Amiodarone, Toprol  Was not on anticoagulation    Essential hypertension- (present on admission)  Assessment & Plan  Aldactone  Decrease Toprol-XL to 50 mg daily  Decrease Lisinopril to 10 mg daily    Type 2 diabetes mellitus with hyperglycemia, without long-term current use of insulin ([facility])- (present on admission)  Assessment & Plan  Lantus, SSI      Hypomagnesemia- (present on admission)  Assessment & Plan  Oral Mg  Follow level    Hypokalemia- (present on admission)  Assessment & Plan  Follow bmp    Elevated digoxin level  Assessment & Plan  Hold digoxin       VTE prophylaxis: SCD

## 2020-12-09 NOTE — CARE PLAN
Problem: Safety  Goal: Will remain free from injury  Outcome: PROGRESSING AS EXPECTED     Problem: Infection  Goal: Will remain free from infection  Outcome: PROGRESSING AS EXPECTED     Problem: Pain Management  Goal: Pain level will decrease to patient's comfort goal  Outcome: PROGRESSING AS EXPECTED     Problem: Psychosocial Needs:  Goal: Level of anxiety will decrease  Outcome: PROGRESSING AS EXPECTED

## 2020-12-10 ENCOUNTER — APPOINTMENT (OUTPATIENT)
Dept: RADIOLOGY | Facility: MEDICAL CENTER | Age: 55
DRG: 329 | End: 2020-12-10
Attending: FAMILY MEDICINE
Payer: MEDICAID

## 2020-12-10 LAB
ANION GAP SERPL CALC-SCNC: 8 MMOL/L (ref 7–16)
BASOPHILS # BLD AUTO: 0.8 % (ref 0–1.8)
BASOPHILS # BLD: 0.08 K/UL (ref 0–0.12)
BUN SERPL-MCNC: 19 MG/DL (ref 8–22)
CALCIUM SERPL-MCNC: 9.2 MG/DL (ref 8.5–10.5)
CHLORIDE SERPL-SCNC: 96 MMOL/L (ref 96–112)
CO2 SERPL-SCNC: 30 MMOL/L (ref 20–33)
CREAT SERPL-MCNC: 1.04 MG/DL (ref 0.5–1.4)
EOSINOPHIL # BLD AUTO: 0.9 K/UL (ref 0–0.51)
EOSINOPHIL NFR BLD: 9.4 % (ref 0–6.9)
ERYTHROCYTE [DISTWIDTH] IN BLOOD BY AUTOMATED COUNT: 61.9 FL (ref 35.9–50)
GLUCOSE BLD-MCNC: 108 MG/DL (ref 65–99)
GLUCOSE BLD-MCNC: 108 MG/DL (ref 65–99)
GLUCOSE BLD-MCNC: 115 MG/DL (ref 65–99)
GLUCOSE BLD-MCNC: 117 MG/DL (ref 65–99)
GLUCOSE BLD-MCNC: 123 MG/DL (ref 65–99)
GLUCOSE BLD-MCNC: 123 MG/DL (ref 65–99)
GLUCOSE BLD-MCNC: 127 MG/DL (ref 65–99)
GLUCOSE SERPL-MCNC: 105 MG/DL (ref 65–99)
HCT VFR BLD AUTO: 36.3 % (ref 42–52)
HGB BLD-MCNC: 10.7 G/DL (ref 14–18)
IMM GRANULOCYTES # BLD AUTO: 0.07 K/UL (ref 0–0.11)
IMM GRANULOCYTES NFR BLD AUTO: 0.7 % (ref 0–0.9)
LYMPHOCYTES # BLD AUTO: 2.72 K/UL (ref 1–4.8)
LYMPHOCYTES NFR BLD: 28.4 % (ref 22–41)
MAGNESIUM SERPL-MCNC: 1.8 MG/DL (ref 1.5–2.5)
MCH RBC QN AUTO: 25.8 PG (ref 27–33)
MCHC RBC AUTO-ENTMCNC: 29.5 G/DL (ref 33.7–35.3)
MCV RBC AUTO: 87.5 FL (ref 81.4–97.8)
MONOCYTES # BLD AUTO: 0.81 K/UL (ref 0–0.85)
MONOCYTES NFR BLD AUTO: 8.5 % (ref 0–13.4)
NEUTROPHILS # BLD AUTO: 5 K/UL (ref 1.82–7.42)
NEUTROPHILS NFR BLD: 52.2 % (ref 44–72)
NRBC # BLD AUTO: 0 K/UL
NRBC BLD-RTO: 0 /100 WBC
NT-PROBNP SERPL IA-MCNC: 2651 PG/ML (ref 0–125)
PHOSPHATE SERPL-MCNC: 4.3 MG/DL (ref 2.5–4.5)
PLATELET # BLD AUTO: 577 K/UL (ref 164–446)
PMV BLD AUTO: 9.8 FL (ref 9–12.9)
POTASSIUM SERPL-SCNC: 3.7 MMOL/L (ref 3.6–5.5)
RBC # BLD AUTO: 4.15 M/UL (ref 4.7–6.1)
SODIUM SERPL-SCNC: 134 MMOL/L (ref 135–145)
WBC # BLD AUTO: 9.6 K/UL (ref 4.8–10.8)

## 2020-12-10 PROCEDURE — 80048 BASIC METABOLIC PNL TOTAL CA: CPT

## 2020-12-10 PROCEDURE — 83735 ASSAY OF MAGNESIUM: CPT

## 2020-12-10 PROCEDURE — 770020 HCHG ROOM/CARE - TELE (206)

## 2020-12-10 PROCEDURE — 700102 HCHG RX REV CODE 250 W/ 637 OVERRIDE(OP): Performed by: FAMILY MEDICINE

## 2020-12-10 PROCEDURE — 700111 HCHG RX REV CODE 636 W/ 250 OVERRIDE (IP): Performed by: STUDENT IN AN ORGANIZED HEALTH CARE EDUCATION/TRAINING PROGRAM

## 2020-12-10 PROCEDURE — 700111 HCHG RX REV CODE 636 W/ 250 OVERRIDE (IP): Performed by: INTERNAL MEDICINE

## 2020-12-10 PROCEDURE — A9270 NON-COVERED ITEM OR SERVICE: HCPCS | Performed by: FAMILY MEDICINE

## 2020-12-10 PROCEDURE — 84100 ASSAY OF PHOSPHORUS: CPT

## 2020-12-10 PROCEDURE — 700105 HCHG RX REV CODE 258: Performed by: STUDENT IN AN ORGANIZED HEALTH CARE EDUCATION/TRAINING PROGRAM

## 2020-12-10 PROCEDURE — A9270 NON-COVERED ITEM OR SERVICE: HCPCS | Performed by: STUDENT IN AN ORGANIZED HEALTH CARE EDUCATION/TRAINING PROGRAM

## 2020-12-10 PROCEDURE — 99232 SBSQ HOSP IP/OBS MODERATE 35: CPT | Performed by: FAMILY MEDICINE

## 2020-12-10 PROCEDURE — 83880 ASSAY OF NATRIURETIC PEPTIDE: CPT

## 2020-12-10 PROCEDURE — 700111 HCHG RX REV CODE 636 W/ 250 OVERRIDE (IP): Performed by: FAMILY MEDICINE

## 2020-12-10 PROCEDURE — 700102 HCHG RX REV CODE 250 W/ 637 OVERRIDE(OP): Performed by: STUDENT IN AN ORGANIZED HEALTH CARE EDUCATION/TRAINING PROGRAM

## 2020-12-10 PROCEDURE — 82962 GLUCOSE BLOOD TEST: CPT | Mod: 91

## 2020-12-10 PROCEDURE — 700102 HCHG RX REV CODE 250 W/ 637 OVERRIDE(OP): Performed by: PSYCHIATRY & NEUROLOGY

## 2020-12-10 PROCEDURE — A9270 NON-COVERED ITEM OR SERVICE: HCPCS | Performed by: PSYCHIATRY & NEUROLOGY

## 2020-12-10 PROCEDURE — 85025 COMPLETE CBC W/AUTO DIFF WBC: CPT

## 2020-12-10 PROCEDURE — 700102 HCHG RX REV CODE 250 W/ 637 OVERRIDE(OP): Performed by: INTERNAL MEDICINE

## 2020-12-10 PROCEDURE — 36415 COLL VENOUS BLD VENIPUNCTURE: CPT

## 2020-12-10 PROCEDURE — A9270 NON-COVERED ITEM OR SERVICE: HCPCS | Performed by: INTERNAL MEDICINE

## 2020-12-10 RX ORDER — MAGNESIUM SULFATE HEPTAHYDRATE 40 MG/ML
2 INJECTION, SOLUTION INTRAVENOUS ONCE
Status: COMPLETED | OUTPATIENT
Start: 2020-12-10 | End: 2020-12-10

## 2020-12-10 RX ADMIN — CEFTRIAXONE 2 G: 2 INJECTION, POWDER, FOR SOLUTION INTRAMUSCULAR; INTRAVENOUS at 05:39

## 2020-12-10 RX ADMIN — Medication 400 MG: at 05:40

## 2020-12-10 RX ADMIN — RISPERIDONE 0.5 MG: 1 TABLET ORAL at 05:45

## 2020-12-10 RX ADMIN — FUROSEMIDE 20 MG: 10 INJECTION, SOLUTION INTRAMUSCULAR; INTRAVENOUS at 15:45

## 2020-12-10 RX ADMIN — MAGNESIUM SULFATE 2 G: 2 INJECTION INTRAVENOUS at 09:12

## 2020-12-10 RX ADMIN — HYDROCODONE BITARTRATE AND ACETAMINOPHEN 2 TABLET: 5; 325 TABLET ORAL at 17:39

## 2020-12-10 RX ADMIN — RISPERIDONE 1 MG: 1 TABLET ORAL at 17:39

## 2020-12-10 RX ADMIN — INSULIN GLARGINE 15 UNITS: 100 INJECTION, SOLUTION SUBCUTANEOUS at 06:02

## 2020-12-10 RX ADMIN — METOPROLOL SUCCINATE 50 MG: 50 TABLET, EXTENDED RELEASE ORAL at 05:41

## 2020-12-10 RX ADMIN — LEVOTHYROXINE SODIUM 112 MCG: 112 TABLET ORAL at 05:41

## 2020-12-10 RX ADMIN — TRAZODONE HYDROCHLORIDE 50 MG: 50 TABLET ORAL at 21:08

## 2020-12-10 RX ADMIN — MODAFINIL 100 MG: 100 TABLET ORAL at 05:40

## 2020-12-10 RX ADMIN — FUROSEMIDE 20 MG: 10 INJECTION, SOLUTION INTRAMUSCULAR; INTRAVENOUS at 05:41

## 2020-12-10 RX ADMIN — CETIRIZINE HYDROCHLORIDE 10 MG: 10 TABLET, FILM COATED ORAL at 05:39

## 2020-12-10 RX ADMIN — SPIRONOLACTONE 25 MG: 50 TABLET ORAL at 05:40

## 2020-12-10 RX ADMIN — FLUCONAZOLE 400 MG: 200 TABLET ORAL at 05:41

## 2020-12-10 RX ADMIN — VANCOMYCIN HYDROCHLORIDE 1000 MG: 500 INJECTION, POWDER, LYOPHILIZED, FOR SOLUTION INTRAVENOUS at 12:52

## 2020-12-10 RX ADMIN — AMIODARONE HYDROCHLORIDE 200 MG: 200 TABLET ORAL at 05:41

## 2020-12-10 RX ADMIN — METOPROLOL SUCCINATE 50 MG: 50 TABLET, EXTENDED RELEASE ORAL at 17:39

## 2020-12-10 RX ADMIN — FAMOTIDINE 20 MG: 20 TABLET, FILM COATED ORAL at 05:41

## 2020-12-10 ASSESSMENT — ENCOUNTER SYMPTOMS
BACK PAIN: 0
ABDOMINAL PAIN: 0
DIAPHORESIS: 0
DIZZINESS: 0
MYALGIAS: 0
SHORTNESS OF BREATH: 0
WHEEZING: 0
NAUSEA: 0
HEARTBURN: 0
BLURRED VISION: 0
SORE THROAT: 0
CHILLS: 0
HEADACHES: 0
SPEECH CHANGE: 0
NECK PAIN: 0
DIARRHEA: 0
FLANK PAIN: 0
COUGH: 0
NERVOUS/ANXIOUS: 0
SENSORY CHANGE: 0
FOCAL WEAKNESS: 0
WEAKNESS: 1
FEVER: 0
VOMITING: 0
PALPITATIONS: 0

## 2020-12-10 ASSESSMENT — FIBROSIS 4 INDEX: FIB4 SCORE: 0.59

## 2020-12-10 NOTE — PROGRESS NOTES
Pt is A&O x's 4, VSS, denies any pain, on room air, denies any SOB or CP. Midline abd incision with wound vac in place, no indication or leaks, IR drain to bulb suction, small, serosang OP, LLQ ostomy, + output, appliance checked, and burped, frye catheter in place to gravity, + output. Tolerating diet, denies any N/V/D, up max assist, Q 2 turnes and repositioning performed frequently. Midline to LUE leaking, dressing replaced, continues to leak, VATS team updated. POC discussed with pt, all questions and concerns have been addressed, call light within reach. All needs met at this time. Fall Precautions and hourly rounding in place.

## 2020-12-10 NOTE — PROGRESS NOTES
Hospital Medicine Daily Progress Note    Date of Service  12/10/2020    Chief Complaint  55 y.o. male admitted 11/3/2020 with perforated colon.    Hospital Course  Admitted as a transfer, was noted to have splenic fluid collection.  Surgery was consulted on the case, CT-guided drain was done.  Further work-up revealed that he had a perforated colon.  Patient underwent segmental colectomy, colostomy creation, mobilization of splenic flexure, wound VAC placement, irrigation debridement of flank wound on 11/10/2020.  His culture showed Enterococcus and Candida.  His blood culture in 1 bottle showed Streptococcus viridans.  Infectious disease was consulted on the case. On  admission he also had acute kidney injury and hyperkalemia, Nephrology was consulted on the case and these have resolved. He has known history of congestive heart failure and cardiomyopathy, chronic atrial fibrillation, had episodes of rapid ventricular rate and Cardiology was consulted on the case.  He also had an episode of suicidal ideation and he was placed on legal hold.  Psychiatry was consulted on the case, he has been taken off legal hold at this point.    Interval Problem Update  Perforated colon - pain controlled, tolerating regular diet  Abdominal abscess - culture showed Enterococcus and Candida  Bacteremia - Streptococcus viridans, Echocardiogram and repeat cultures negative  Diabetes - 110-120  HTN - sbp 100-130  CHF/CM - probnp 2600  Afib - rate   Low magnesium    Consultants/Specialty  Surgery  ID  Nephrology  Carddiology  Psychiatry    Code Status  Full Code    Disposition  Legal hold discontinued  SNF  Medically cleared    Review of Systems  Review of Systems   Constitutional: Positive for malaise/fatigue. Negative for chills, diaphoresis and fever.   HENT: Negative for congestion, hearing loss and sore throat.    Eyes: Negative for blurred vision.   Respiratory: Negative for cough, shortness of breath and wheezing.     Cardiovascular: Negative for chest pain, palpitations and leg swelling.   Gastrointestinal: Negative for abdominal pain, diarrhea, heartburn, nausea and vomiting.   Genitourinary: Negative for dysuria, flank pain and hematuria.   Musculoskeletal: Negative for back pain, joint pain, myalgias and neck pain.   Skin: Negative for rash.   Neurological: Positive for weakness. Negative for dizziness, sensory change, speech change, focal weakness and headaches.   Psychiatric/Behavioral: Negative for suicidal ideas. The patient is not nervous/anxious.         Physical Exam  Temp:  [36.1 °C (97 °F)-36.7 °C (98 °F)] 36.2 °C (97.2 °F)  Pulse:  [] 117  Resp:  [17-20] 20  BP: (101-128)/(60-83) 128/83  SpO2:  [90 %-95 %] 94 %    Physical Exam  Vitals signs and nursing note reviewed.   Constitutional:       Appearance: He is obese.   HENT:      Head: Normocephalic and atraumatic.      Nose: No congestion.      Mouth/Throat:      Mouth: Mucous membranes are moist.   Eyes:      Extraocular Movements: Extraocular movements intact.      Conjunctiva/sclera: Conjunctivae normal.      Pupils: Pupils are equal, round, and reactive to light.   Neck:      Musculoskeletal: No muscular tenderness.   Cardiovascular:      Rate and Rhythm: Normal rate. Rhythm irregularly irregular.   Pulmonary:      Effort: Pulmonary effort is normal.      Breath sounds: Normal breath sounds.   Abdominal:      General: There is no distension.      Tenderness: There is no abdominal tenderness. There is no guarding or rebound.      Comments: Wound vac at midline  Ostomy  Drain at LUQ   Musculoskeletal:      Right lower leg: Edema present.      Left lower leg: Edema present.   Lymphadenopathy:      Cervical: No cervical adenopathy.   Skin:     General: Skin is warm and dry.   Neurological:      General: No focal deficit present.      Mental Status: He is alert and oriented to person, place, and time.      Cranial Nerves: No cranial nerve deficit.    Psychiatric:         Thought Content: Thought content does not include suicidal ideation. Thought content does not include suicidal plan.         Fluids    Intake/Output Summary (Last 24 hours) at 12/10/2020 1004  Last data filed at 12/10/2020 0825  Gross per 24 hour   Intake 600 ml   Output 2605 ml   Net -2005 ml       Laboratory  Recent Labs     12/08/20  0708 12/09/20  0625 12/10/20  0445   WBC 9.1 10.4 9.6   RBC 3.92* 4.20* 4.15*   HEMOGLOBIN 10.3* 10.8* 10.7*   HEMATOCRIT 33.4* 36.3* 36.3*   MCV 85.2 86.4 87.5   MCH 26.3* 25.7* 25.8*   MCHC 30.8* 29.8* 29.5*   RDW 59.7* 61.3* 61.9*   PLATELETCT 576* 618* 577*   MPV 8.9* 9.3 9.8     Recent Labs     12/08/20  0708 12/09/20  0625 12/10/20  0445   SODIUM 130* 132* 134*   POTASSIUM 3.8 3.8 3.7   CHLORIDE 96 95* 96   CO2 29 30 30   GLUCOSE 142* 116* 105*   BUN 16 17 19   CREATININE 0.91 0.89 1.04   CALCIUM 9.3 9.5 9.2                   Imaging  CT-ABDOMEN-PELVIS WITH   Final Result      1.  Interval placement of a surgical drain into a splenic fluid collection. The fluid collection has decreased in size with a small amount of residual fluid seen surrounding the percutaneous catheter drain tip.      2.  No evidence of bowel obstruction.      3.  Left lower quadrant ostomy site.      4.  Bibasilar atelectasis with small bilateral pleural effusions.      5.  Gallstones.      6.  Fatty liver.      7.  Anasarca.      8.  Small amount of ascites.      IR-MIDLINE CATHETER INSERTION WO GUIDANCE > AGE 3   Final Result                  Ultrasound-guided midline placement performed by qualified nursing staff    as above.          CT-DRAIN-PERITONEAL   Final Result      Successful splenic abscess drainage tube placement.      Plan: Twice daily flushes with 10 mL of sterile saline. Monitor outputs.         CT-ABDOMEN-PELVIS WITH   Final Result      1.  Intraparenchymal collection in the anterior aspect of the spleen has increased in size from the prior exam.      2.  Small  amount of free fluid in the pelvis has increased from the prior exam.      3.  Small bilateral pleural effusions layer posteriorly, left greater than right, slightly decreased from the prior exam. Adjacent airspace disease is likely atelectasis.      4.  Cholelithiasis.      5.  Right renal scarring.      VL-GNXAEOI-3 VIEW   Final Result      1.  No evidence of bowel obstruction.   2.  Postoperative changes as described.      CT-ABDOMEN-PELVIS W/O   Final Result      Postsurgical changes with a left abdominal stoma. Parastomal densities may be related to postsurgical hematoma measuring up to 2.8 x 2.1 cm.      Small amount of free fluid in the abdomen and pelvis.      Collection along the anterior spleen measures approximately 2.9 x 4.3 cm is decreased in size compared to the prior examination. Evaluation is limited by lack of intravenous contrast.      Third spacing.      Trace free intraperitoneal air is likely related to recent surgery.      Moderate left and small right pleural effusions with overlying atelectasis/consolidation.      Cortical scarring of the right kidney.      Cholelithiasis.         DX-CHEST-PORTABLE (1 VIEW)   Final Result      1.  Bilateral airspace and interstitial infiltrates.      2.  Cardiomegaly.      US-RENAL   Final Result    Examination was limited due to poor sound penetration related to body habitus.   Grossly normal appearance of the kidneys.      EC-ECHOCARDIOGRAM COMPLETE W/ CONT   Final Result      DX-CHEST-PORTABLE (1 VIEW)   Final Result      No pneumothorax identified following right subclavian line placement      Worsening atelectasis with consolidation at the bases not excluded      Stable cardiac silhouette enlargement      CT-ABDOMEN-PELVIS WITH   Final Result      Interval decrease in size of perisplenic abscess. Pigtail catheter now in place      Abscess has likely fistulous communication with the abutting splenic flexure of the colon. Colon is otherwise normal in  appearance      Mild hepatosplenomegaly      Decreasing small left pleural effusion      Subacute anterior wedge compression fractures in the caudal thoracic spine      CT-DRAIN-PERITONEAL   Final Result      1.  CT guided perisplenic abscess catheter drainage.   2.  The current plan is to obtain a follow-up CT in 5-7 days..      OUTSIDE IMAGES-CT CHEST   Final Result      OUTSIDE IMAGES-CT ABDOMEN /PELVIS   Final Result      CT-ABDOMEN-PELVIS WITH   Final Result   Addendum 1 of 1   Addendum:   A prior noncontrast CT of the abdomen and pelvis from [facility] dated 10/19/2020 was made available for comparison. On the    prior study a perisplenic/subcapsular fluid collection was seen however    did not contain air at that time and    there was no discrete evidence of communication with the transverse colon.    Communication with splenic flexure of the colon and air within the    collection are new from the prior study.      Final      DX-CHEST-PORTABLE (1 VIEW)   Final Result      Slight interval interval improvement in congestive heart failure.           Assessment/Plan  * Spontaneous perforation of colon (HCC)- (present on admission)  Assessment & Plan  Segmental colectomy, colostomy creation, mobilization of splenic flexure, wound VAC placement, irrigation and debridement of flank wound 11/10/2020  IV Vancomycin, Rocephin, Diflucan until 12/11/2020  Wound care  Pain control    Gram-positive bacteremia- (present on admission)  Assessment & Plan  IV Vancomycin and Rocephin until 12/11/2020    Intra-abdominal abscess (HCC)- (present on admission)  Assessment & Plan  CT-guided drain 11/25/2020  CT guided Drain Placement 11/3/2020  IV Vancomycin, Rocephin, Diflucan until 12/11/2020    SRUTHI (acute kidney injury) (HCC)  Assessment & Plan  Follow BMP    Sepsis (HCC)  Assessment & Plan  Secondary to perforation of colon, splenic abscess    Anxiety and depression- (present on admission)  Assessment &  Plan  Risperdal, Trazodone    Hypothyroidism- (present on admission)  Assessment & Plan  Levothyroxine 112 mcg   Check TSH on 12/21/2020    Prolonged QT interval- (present on admission)  Assessment & Plan  Cardiac monitor  follow EKG    NICM (nonischemic cardiomyopathy) (HCC)- (present on admission)  Assessment & Plan  Toprol, Lisinopril, Lasix, Aldactone    Suicidal ideation  Assessment & Plan  Off Legal hold    Chronic systolic heart failure (HCC)- (present on admission)  Assessment & Plan  Toprol, Lisinopril, Aldactone  IV Lasix     Chronic atrial fibrillation- (present on admission)  Assessment & Plan  Amiodarone, Toprol  Was not on anticoagulation    Essential hypertension- (present on admission)  Assessment & Plan  Aldactone, Toprol-XL, Lisinopril    Type 2 diabetes mellitus with hyperglycemia, without long-term current use of insulin ([facility])- (present on admission)  Assessment & Plan  Lantus, SSI      Hypomagnesemia- (present on admission)  Assessment & Plan  IV Mg 2 g  Follow level    Hypokalemia- (present on admission)  Assessment & Plan  Follow bmp    Elevated digoxin level  Assessment & Plan  Hold digoxin       VTE prophylaxis: SCD

## 2020-12-10 NOTE — PROGRESS NOTES
Pharmacy Kinetics 55 y.o. male on vancomycin day #23 (12/10/2020)    Currently on Vancomycin  1000mg iv q24hr  Provider specified end date: 20     Indication for Treatment: IAI     Pertinent history per medical record: Admitted on 11/3/2020 for  concern of abdominal pain after surgical intervention. Concern for subsequent colonic perforation and possible abscess. ID and surgery consulted.     Other antibiotics: Ceftriaxone 2 grams q24H, fluconazole 400 mg q24H     Allergies: Daptomycin, Pcn [penicillins], Unasyn [ampicillin-sulbactam sodium], and Vancomycin   List concerns for renal function: BMI 58.16 kg/m2; albumin <3; abnormal LFTs     Pertinent cultures to date:   20 - wound (vj-splenic drain): E faecalis  11/10/20 - colon perforation: E faecalis; C albicans     MRSA nares swab if pneumonia is a concern (ordered/positive/negative/n-a): N/A    Recent Labs     20  0708 20  0625 12/10/20  0445   WBC 9.1 10.4 9.6   NEUTSPOLYS 54.30 52.00 52.20     Recent Labs     20  0708 20  0625 12/10/20  0445   BUN 16 17 19   CREATININE 0.91 0.89 1.04     No results for input(s): VANCOTROUGH, VANCOPEAK, VANCORANDOM in the last 72 hours.    Intake/Output Summary (Last 24 hours) at 12/10/2020 0840  Last data filed at 12/10/2020 0400  Gross per 24 hour   Intake 600 ml   Output 2055 ml   Net -1455 ml      /77   Pulse (!) 108   Temp 36.1 °C (97 °F) (Temporal)   Resp 18   Ht 1.829 m (6')   Wt (!) 194.5 kg (428 lb 12.7 oz)   SpO2 95%  Temp (24hrs), Av.3 °C (97.4 °F), Min:36.1 °C (97 °F), Max:36.7 °C (98 °F)      A/P        1. Vancomycin dose change: Not indicated at this time- continue with vancomycin 1000 mg q24H  2. Next vancomycin level: Not clinically indicated at this time- unlikely to need another trough unless therapy is extended or renal function declines  3. Goal trough: 10-15 mcg/mL  4. Comments: Slight bump in SCr. Plan for antibiotics through 20. No need for  vancomycin trough unless therapy is extended or renal function worsens as patient's most recent vancomycin trough on 12/5 was therapeutic. Per protocol, vancomycin troughs required once weekly or more if clinically warranted. Pharmacy will CTM.    Xenia ZendejasD

## 2020-12-10 NOTE — PROGRESS NOTES
Received report from ELISA Holbrook. Assumed care at 1915. This pt is AOx4, ambulatory with x1 - x2 assistance, denies N/V this evening, (+) flatus in ostomy bag, normoactive bs, voiding with frye, reporting no pain that is 0/10. Midline with prevena, CDI. IR drain to bulb suction, LLQ ostomy, + output, frye in place, + output.  Patient and RN discussed plan of care, all questions answered. Labs noted, assessment complete, patient tolerating regular diet. Call light in place, personal belongings available, bed in lowest position, upper bedrails up, patient educated on importance of calling for assistance, hourly rounding in place. No additional needs at this time. VSS. will continue to monitor

## 2020-12-11 LAB
ANION GAP SERPL CALC-SCNC: 10 MMOL/L (ref 7–16)
BASOPHILS # BLD AUTO: 1 % (ref 0–1.8)
BASOPHILS # BLD: 0.1 K/UL (ref 0–0.12)
BUN SERPL-MCNC: 20 MG/DL (ref 8–22)
CALCIUM SERPL-MCNC: 9.2 MG/DL (ref 8.5–10.5)
CHLORIDE SERPL-SCNC: 95 MMOL/L (ref 96–112)
CO2 SERPL-SCNC: 29 MMOL/L (ref 20–33)
CREAT SERPL-MCNC: 1 MG/DL (ref 0.5–1.4)
EOSINOPHIL # BLD AUTO: 0.78 K/UL (ref 0–0.51)
EOSINOPHIL NFR BLD: 7.9 % (ref 0–6.9)
ERYTHROCYTE [DISTWIDTH] IN BLOOD BY AUTOMATED COUNT: 61.7 FL (ref 35.9–50)
GLUCOSE BLD-MCNC: 112 MG/DL (ref 65–99)
GLUCOSE BLD-MCNC: 117 MG/DL (ref 65–99)
GLUCOSE BLD-MCNC: 145 MG/DL (ref 65–99)
GLUCOSE BLD-MCNC: 96 MG/DL (ref 65–99)
GLUCOSE SERPL-MCNC: 117 MG/DL (ref 65–99)
HCT VFR BLD AUTO: 35.2 % (ref 42–52)
HGB BLD-MCNC: 10.6 G/DL (ref 14–18)
IMM GRANULOCYTES # BLD AUTO: 0.07 K/UL (ref 0–0.11)
IMM GRANULOCYTES NFR BLD AUTO: 0.7 % (ref 0–0.9)
LYMPHOCYTES # BLD AUTO: 3.15 K/UL (ref 1–4.8)
LYMPHOCYTES NFR BLD: 32 % (ref 22–41)
MAGNESIUM SERPL-MCNC: 1.9 MG/DL (ref 1.5–2.5)
MCH RBC QN AUTO: 26.3 PG (ref 27–33)
MCHC RBC AUTO-ENTMCNC: 30.1 G/DL (ref 33.7–35.3)
MCV RBC AUTO: 87.3 FL (ref 81.4–97.8)
MONOCYTES # BLD AUTO: 0.9 K/UL (ref 0–0.85)
MONOCYTES NFR BLD AUTO: 9.1 % (ref 0–13.4)
NEUTROPHILS # BLD AUTO: 4.84 K/UL (ref 1.82–7.42)
NEUTROPHILS NFR BLD: 49.3 % (ref 44–72)
NRBC # BLD AUTO: 0 K/UL
NRBC BLD-RTO: 0 /100 WBC
NT-PROBNP SERPL IA-MCNC: 2857 PG/ML (ref 0–125)
PHOSPHATE SERPL-MCNC: 4.2 MG/DL (ref 2.5–4.5)
PLATELET # BLD AUTO: 508 K/UL (ref 164–446)
PMV BLD AUTO: 9.5 FL (ref 9–12.9)
POTASSIUM SERPL-SCNC: 4.1 MMOL/L (ref 3.6–5.5)
RBC # BLD AUTO: 4.03 M/UL (ref 4.7–6.1)
SODIUM SERPL-SCNC: 134 MMOL/L (ref 135–145)
WBC # BLD AUTO: 9.8 K/UL (ref 4.8–10.8)

## 2020-12-11 PROCEDURE — A9270 NON-COVERED ITEM OR SERVICE: HCPCS | Performed by: FAMILY MEDICINE

## 2020-12-11 PROCEDURE — 80048 BASIC METABOLIC PNL TOTAL CA: CPT

## 2020-12-11 PROCEDURE — 82962 GLUCOSE BLOOD TEST: CPT | Mod: 91

## 2020-12-11 PROCEDURE — A9270 NON-COVERED ITEM OR SERVICE: HCPCS | Performed by: INTERNAL MEDICINE

## 2020-12-11 PROCEDURE — 700101 HCHG RX REV CODE 250: Performed by: FAMILY MEDICINE

## 2020-12-11 PROCEDURE — 36415 COLL VENOUS BLD VENIPUNCTURE: CPT

## 2020-12-11 PROCEDURE — 700102 HCHG RX REV CODE 250 W/ 637 OVERRIDE(OP): Performed by: STUDENT IN AN ORGANIZED HEALTH CARE EDUCATION/TRAINING PROGRAM

## 2020-12-11 PROCEDURE — 770020 HCHG ROOM/CARE - TELE (206)

## 2020-12-11 PROCEDURE — 84100 ASSAY OF PHOSPHORUS: CPT

## 2020-12-11 PROCEDURE — 700111 HCHG RX REV CODE 636 W/ 250 OVERRIDE (IP): Performed by: STUDENT IN AN ORGANIZED HEALTH CARE EDUCATION/TRAINING PROGRAM

## 2020-12-11 PROCEDURE — 700102 HCHG RX REV CODE 250 W/ 637 OVERRIDE(OP): Performed by: INTERNAL MEDICINE

## 2020-12-11 PROCEDURE — 700105 HCHG RX REV CODE 258: Performed by: STUDENT IN AN ORGANIZED HEALTH CARE EDUCATION/TRAINING PROGRAM

## 2020-12-11 PROCEDURE — 99231 SBSQ HOSP IP/OBS SF/LOW 25: CPT | Performed by: PSYCHIATRY & NEUROLOGY

## 2020-12-11 PROCEDURE — 83735 ASSAY OF MAGNESIUM: CPT

## 2020-12-11 PROCEDURE — 83880 ASSAY OF NATRIURETIC PEPTIDE: CPT

## 2020-12-11 PROCEDURE — 700102 HCHG RX REV CODE 250 W/ 637 OVERRIDE(OP): Performed by: FAMILY MEDICINE

## 2020-12-11 PROCEDURE — 700102 HCHG RX REV CODE 250 W/ 637 OVERRIDE(OP): Performed by: PSYCHIATRY & NEUROLOGY

## 2020-12-11 PROCEDURE — 85025 COMPLETE CBC W/AUTO DIFF WBC: CPT

## 2020-12-11 PROCEDURE — A9270 NON-COVERED ITEM OR SERVICE: HCPCS | Performed by: STUDENT IN AN ORGANIZED HEALTH CARE EDUCATION/TRAINING PROGRAM

## 2020-12-11 PROCEDURE — 700111 HCHG RX REV CODE 636 W/ 250 OVERRIDE (IP): Performed by: INTERNAL MEDICINE

## 2020-12-11 PROCEDURE — 99232 SBSQ HOSP IP/OBS MODERATE 35: CPT | Performed by: FAMILY MEDICINE

## 2020-12-11 PROCEDURE — A9270 NON-COVERED ITEM OR SERVICE: HCPCS | Performed by: PSYCHIATRY & NEUROLOGY

## 2020-12-11 PROCEDURE — 700111 HCHG RX REV CODE 636 W/ 250 OVERRIDE (IP): Performed by: FAMILY MEDICINE

## 2020-12-11 RX ORDER — MAGNESIUM SULFATE HEPTAHYDRATE 40 MG/ML
2 INJECTION, SOLUTION INTRAVENOUS ONCE
Status: COMPLETED | OUTPATIENT
Start: 2020-12-11 | End: 2020-12-11

## 2020-12-11 RX ORDER — LIDOCAINE HYDROCHLORIDE 40 MG/ML
SOLUTION TOPICAL
Status: DISCONTINUED | OUTPATIENT
Start: 2020-12-11 | End: 2020-12-17 | Stop reason: HOSPADM

## 2020-12-11 RX ORDER — FUROSEMIDE 20 MG/1
20 TABLET ORAL
Status: DISCONTINUED | OUTPATIENT
Start: 2020-12-12 | End: 2020-12-17 | Stop reason: HOSPADM

## 2020-12-11 RX ORDER — LIDOCAINE HYDROCHLORIDE 20 MG/ML
20 INJECTION, SOLUTION INFILTRATION; PERINEURAL
Status: DISCONTINUED | OUTPATIENT
Start: 2020-12-11 | End: 2020-12-17 | Stop reason: HOSPADM

## 2020-12-11 RX ADMIN — FUROSEMIDE 20 MG: 10 INJECTION, SOLUTION INTRAMUSCULAR; INTRAVENOUS at 05:32

## 2020-12-11 RX ADMIN — HYDROCODONE BITARTRATE AND ACETAMINOPHEN 1 TABLET: 5; 325 TABLET ORAL at 23:58

## 2020-12-11 RX ADMIN — MODAFINIL 100 MG: 100 TABLET ORAL at 05:33

## 2020-12-11 RX ADMIN — FAMOTIDINE 20 MG: 20 TABLET, FILM COATED ORAL at 05:33

## 2020-12-11 RX ADMIN — MAGNESIUM SULFATE 2 G: 2 INJECTION INTRAVENOUS at 08:52

## 2020-12-11 RX ADMIN — AMIODARONE HYDROCHLORIDE 200 MG: 200 TABLET ORAL at 05:33

## 2020-12-11 RX ADMIN — HYDROCODONE BITARTRATE AND ACETAMINOPHEN 1 TABLET: 5; 325 TABLET ORAL at 11:01

## 2020-12-11 RX ADMIN — LEVOTHYROXINE SODIUM 112 MCG: 112 TABLET ORAL at 05:33

## 2020-12-11 RX ADMIN — LISINOPRIL 10 MG: 10 TABLET ORAL at 05:33

## 2020-12-11 RX ADMIN — METOPROLOL SUCCINATE 50 MG: 50 TABLET, EXTENDED RELEASE ORAL at 17:12

## 2020-12-11 RX ADMIN — TRAZODONE HYDROCHLORIDE 50 MG: 50 TABLET ORAL at 20:39

## 2020-12-11 RX ADMIN — INSULIN GLARGINE 15 UNITS: 100 INJECTION, SOLUTION SUBCUTANEOUS at 05:50

## 2020-12-11 RX ADMIN — CETIRIZINE HYDROCHLORIDE 10 MG: 10 TABLET, FILM COATED ORAL at 05:33

## 2020-12-11 RX ADMIN — RISPERIDONE 0.5 MG: 1 TABLET ORAL at 05:34

## 2020-12-11 RX ADMIN — HYDROCODONE BITARTRATE AND ACETAMINOPHEN 2 TABLET: 5; 325 TABLET ORAL at 03:36

## 2020-12-11 RX ADMIN — SPIRONOLACTONE 25 MG: 50 TABLET ORAL at 05:32

## 2020-12-11 RX ADMIN — RISPERIDONE 1 MG: 1 TABLET ORAL at 17:12

## 2020-12-11 RX ADMIN — LIDOCAINE HYDROCHLORIDE 35 ML: 40 SOLUTION TOPICAL at 12:00

## 2020-12-11 RX ADMIN — CEFTRIAXONE 2 G: 2 INJECTION, POWDER, FOR SOLUTION INTRAMUSCULAR; INTRAVENOUS at 05:46

## 2020-12-11 RX ADMIN — VANCOMYCIN HYDROCHLORIDE 1000 MG: 500 INJECTION, POWDER, LYOPHILIZED, FOR SOLUTION INTRAVENOUS at 13:13

## 2020-12-11 RX ADMIN — FLUCONAZOLE 400 MG: 200 TABLET ORAL at 05:32

## 2020-12-11 RX ADMIN — HYDROCODONE BITARTRATE AND ACETAMINOPHEN 1 TABLET: 5; 325 TABLET ORAL at 17:12

## 2020-12-11 ASSESSMENT — ENCOUNTER SYMPTOMS
NERVOUS/ANXIOUS: 0
WEAKNESS: 1
FLANK PAIN: 0
SPEECH CHANGE: 0
WHEEZING: 0
SHORTNESS OF BREATH: 0
FOCAL WEAKNESS: 0
SORE THROAT: 0
CHILLS: 0
FEVER: 0
NECK PAIN: 0
DIAPHORESIS: 0
BACK PAIN: 0
HEADACHES: 0
COUGH: 0
VOMITING: 0
ABDOMINAL PAIN: 0
HEARTBURN: 0
MYALGIAS: 0
BLURRED VISION: 0
NAUSEA: 0
DIZZINESS: 0
PALPITATIONS: 0
SENSORY CHANGE: 0
DIARRHEA: 0

## 2020-12-11 ASSESSMENT — PAIN DESCRIPTION - PAIN TYPE
TYPE: ACUTE PAIN;SURGICAL PAIN
TYPE: ACUTE PAIN

## 2020-12-11 NOTE — DISCHARGE PLANNING
Anticipated Discharge Disposition: SNF    Action: Per [facility] accepted this referral, pending State Covid Clearance.  Per ELISA Simons CM Supervisor, once a accepting facility is identified, if the barrier is the bariatric bed, and PATRICIO can be draft to cover the cost of the bed, CCA aware.    Barriers to Discharge: Bed.     Plan: SNF, pending acceptance and transfer arrangements.

## 2020-12-11 NOTE — DISCHARGE PLANNING
Agency/Facility Name: [facility]  Spoke To: Sathish  Outcome: Pt. Is still accepted. If tests come back negative next Sunday the facility can start admitting patients again. Pt. Will be accepted pending xavier bed.  JASMEET German notified

## 2020-12-11 NOTE — PROGRESS NOTES
Hospital Medicine Daily Progress Note    Date of Service  12/11/2020    Chief Complaint  55 y.o. male admitted 11/3/2020 with perforated colon.    Hospital Course  Admitted as a transfer, was noted to have splenic fluid collection.  Surgery was consulted on the case, CT-guided drain was done.  Further work-up revealed that he had a perforated colon.  Patient underwent segmental colectomy, colostomy creation, mobilization of splenic flexure, wound VAC placement, irrigation debridement of flank wound on 11/10/2020.  His culture showed Enterococcus and Candida.  His blood culture in 1 bottle showed Streptococcus viridans. Infectious disease was consulted on the case.  He was placed on antibiotics the form of IV Vancomycin, Rocephin, Diflucan. On  admission he also had acute kidney injury and hyperkalemia, Nephrology was consulted on the case and these have resolved. He has known history of congestive heart failure and cardiomyopathy, chronic atrial fibrillation, had episodes of rapid ventricular rate and Cardiology was consulted on the case.  He also had an episode of suicidal ideation and he was placed on legal hold.  Psychiatry was consulted on the case, he has been taken off legal hold at this point.  Physical therapy and Occupational Therapy came to evaluate him and they recommend continued rehabitation skilled nursing facility. He vontinues to require wound care with the wound VAC still in place.  Also has a drain still in place at the left upper quadrant area, he will need to follow-up with surgery for this.    Interval Problem Update  Perforated colon - pain controlled, tolerating regular diet  Abdominal abscess - culture showed Enterococcus and Candida  Bacteremia - Streptococcus viridans, Echocardiogram and repeat cultures negative  Diabetes - 110-140  HTN - sbp 100-120  CHF/CM - probnp 2800  Afib - rate   Low magnesium    Consultants/Specialty  Surgery  ID  Nephrology  Carddiology  Psychiatry    Code  Status  Full Code    Disposition  Legal hold discontinued  Medically cleared   SNF - bed possibly available tomorrow    Review of Systems  Review of Systems   Constitutional: Positive for malaise/fatigue. Negative for chills, diaphoresis and fever.   HENT: Negative for congestion, hearing loss and sore throat.    Eyes: Negative for blurred vision.   Respiratory: Negative for cough, shortness of breath and wheezing.    Cardiovascular: Negative for chest pain, palpitations and leg swelling.   Gastrointestinal: Negative for abdominal pain, diarrhea, heartburn, nausea and vomiting.   Genitourinary: Negative for dysuria, flank pain and hematuria.   Musculoskeletal: Negative for back pain, joint pain, myalgias and neck pain.   Skin: Negative for rash.   Neurological: Positive for weakness. Negative for dizziness, sensory change, speech change, focal weakness and headaches.   Psychiatric/Behavioral: Negative for suicidal ideas. The patient is not nervous/anxious.         Physical Exam  Temp:  [35.9 °C (96.7 °F)-36.4 °C (97.6 °F)] 36.3 °C (97.3 °F)  Pulse:  [] 103  Resp:  [16-20] 19  BP: (105-122)/(61-82) 113/75  SpO2:  [91 %-100 %] 95 %    Physical Exam  Vitals signs and nursing note reviewed.   Constitutional:       Appearance: He is obese.   HENT:      Head: Normocephalic and atraumatic.      Nose: No congestion.      Mouth/Throat:      Mouth: Mucous membranes are moist.   Eyes:      Extraocular Movements: Extraocular movements intact.      Conjunctiva/sclera: Conjunctivae normal.      Pupils: Pupils are equal, round, and reactive to light.   Neck:      Musculoskeletal: No muscular tenderness.   Cardiovascular:      Rate and Rhythm: Normal rate. Rhythm irregularly irregular.   Pulmonary:      Effort: Pulmonary effort is normal.      Breath sounds: Normal breath sounds.   Abdominal:      General: There is no distension.      Tenderness: There is no abdominal tenderness. There is no guarding or rebound.       Comments: Wound vac at midline  Ostomy  Drain at LUQ   Musculoskeletal:      Right lower leg: Edema present.      Left lower leg: Edema present.   Lymphadenopathy:      Cervical: No cervical adenopathy.   Skin:     General: Skin is warm and dry.   Neurological:      General: No focal deficit present.      Mental Status: He is alert and oriented to person, place, and time.      Cranial Nerves: No cranial nerve deficit.   Psychiatric:         Thought Content: Thought content does not include suicidal ideation. Thought content does not include suicidal plan.         Fluids    Intake/Output Summary (Last 24 hours) at 12/11/2020 1143  Last data filed at 12/11/2020 1125  Gross per 24 hour   Intake 600 ml   Output 2556 ml   Net -1956 ml       Laboratory  Recent Labs     12/09/20  0625 12/10/20  0445 12/11/20  0848   WBC 10.4 9.6 9.8   RBC 4.20* 4.15* 4.03*   HEMOGLOBIN 10.8* 10.7* 10.6*   HEMATOCRIT 36.3* 36.3* 35.2*   MCV 86.4 87.5 87.3   MCH 25.7* 25.8* 26.3*   MCHC 29.8* 29.5* 30.1*   RDW 61.3* 61.9* 61.7*   PLATELETCT 618* 577* 508*   MPV 9.3 9.8 9.5     Recent Labs     12/09/20  0625 12/10/20  0445 12/11/20  0848   SODIUM 132* 134* 134*   POTASSIUM 3.8 3.7 4.1   CHLORIDE 95* 96 95*   CO2 30 30 29   GLUCOSE 116* 105* 117*   BUN 17 19 20   CREATININE 0.89 1.04 1.00   CALCIUM 9.5 9.2 9.2                   Imaging  IR-US GUIDED PIV   Final Result    Ultrasound-guided PERIPHERAL IV INSERTION performed by    qualified nursing staff as above.      CT-ABDOMEN-PELVIS WITH   Final Result      1.  Interval placement of a surgical drain into a splenic fluid collection. The fluid collection has decreased in size with a small amount of residual fluid seen surrounding the percutaneous catheter drain tip.      2.  No evidence of bowel obstruction.      3.  Left lower quadrant ostomy site.      4.  Bibasilar atelectasis with small bilateral pleural effusions.      5.  Gallstones.      6.  Fatty liver.      7.  Anasarca.      8.  Small  amount of ascites.      IR-MIDLINE CATHETER INSERTION WO GUIDANCE > AGE 3   Final Result                  Ultrasound-guided midline placement performed by qualified nursing staff    as above.          CT-DRAIN-PERITONEAL   Final Result      Successful splenic abscess drainage tube placement.      Plan: Twice daily flushes with 10 mL of sterile saline. Monitor outputs.         CT-ABDOMEN-PELVIS WITH   Final Result      1.  Intraparenchymal collection in the anterior aspect of the spleen has increased in size from the prior exam.      2.  Small amount of free fluid in the pelvis has increased from the prior exam.      3.  Small bilateral pleural effusions layer posteriorly, left greater than right, slightly decreased from the prior exam. Adjacent airspace disease is likely atelectasis.      4.  Cholelithiasis.      5.  Right renal scarring.      NS-BLYSNPM-0 VIEW   Final Result      1.  No evidence of bowel obstruction.   2.  Postoperative changes as described.      CT-ABDOMEN-PELVIS W/O   Final Result      Postsurgical changes with a left abdominal stoma. Parastomal densities may be related to postsurgical hematoma measuring up to 2.8 x 2.1 cm.      Small amount of free fluid in the abdomen and pelvis.      Collection along the anterior spleen measures approximately 2.9 x 4.3 cm is decreased in size compared to the prior examination. Evaluation is limited by lack of intravenous contrast.      Third spacing.      Trace free intraperitoneal air is likely related to recent surgery.      Moderate left and small right pleural effusions with overlying atelectasis/consolidation.      Cortical scarring of the right kidney.      Cholelithiasis.         DX-CHEST-PORTABLE (1 VIEW)   Final Result      1.  Bilateral airspace and interstitial infiltrates.      2.  Cardiomegaly.      US-RENAL   Final Result    Examination was limited due to poor sound penetration related to body habitus.   Grossly normal appearance of the kidneys.       EC-ECHOCARDIOGRAM COMPLETE W/ CONT   Final Result      DX-CHEST-PORTABLE (1 VIEW)   Final Result      No pneumothorax identified following right subclavian line placement      Worsening atelectasis with consolidation at the bases not excluded      Stable cardiac silhouette enlargement      CT-ABDOMEN-PELVIS WITH   Final Result      Interval decrease in size of perisplenic abscess. Pigtail catheter now in place      Abscess has likely fistulous communication with the abutting splenic flexure of the colon. Colon is otherwise normal in appearance      Mild hepatosplenomegaly      Decreasing small left pleural effusion      Subacute anterior wedge compression fractures in the caudal thoracic spine      CT-DRAIN-PERITONEAL   Final Result      1.  CT guided perisplenic abscess catheter drainage.   2.  The current plan is to obtain a follow-up CT in 5-7 days..      OUTSIDE IMAGES-CT CHEST   Final Result      OUTSIDE IMAGES-CT ABDOMEN /PELVIS   Final Result      CT-ABDOMEN-PELVIS WITH   Final Result   Addendum 1 of 1   Addendum:   A prior noncontrast CT of the abdomen and pelvis from [facility] dated 10/19/2020 was made available for comparison. On the    prior study a perisplenic/subcapsular fluid collection was seen however    did not contain air at that time and    there was no discrete evidence of communication with the transverse colon.    Communication with splenic flexure of the colon and air within the    collection are new from the prior study.      Final      DX-CHEST-PORTABLE (1 VIEW)   Final Result      Slight interval interval improvement in congestive heart failure.           Assessment/Plan  * Spontaneous perforation of colon (HCC)- (present on admission)  Assessment & Plan  Segmental colectomy, colostomy creation, mobilization of splenic flexure, wound VAC placement, irrigation and debridement of flank wound 11/10/2020  IV Vancomycin, Rocephin, Diflucan until 12/11/2020  Wound  care  Pain control    Gram-positive bacteremia- (present on admission)  Assessment & Plan  IV Vancomycin and Rocephin until 12/11/2020    Intra-abdominal abscess (HCC)- (present on admission)  Assessment & Plan  CT-guided drain 11/25/2020  CT guided Drain Placement 11/3/2020  IV Vancomycin, Rocephin, Diflucan until 12/11/2020    SRUTHI (acute kidney injury) ([facility])  Assessment & Plan  Follow BMP    Sepsis ([facility])  Assessment & Plan  Secondary to perforation of colon, splenic abscess    Anxiety and depression- (present on admission)  Assessment & Plan  Risperdal, Trazodone    Hypothyroidism- (present on admission)  Assessment & Plan  Levothyroxine 112 mcg   Check TSH on 12/21/2020    Prolonged QT interval- (present on admission)  Assessment & Plan  Cardiac monitor  follow EKG    NICM (nonischemic cardiomyopathy) ([facility])- (present on admission)  Assessment & Plan  Toprol, Lisinopril, Lasix, Aldactone    Suicidal ideation  Assessment & Plan  Off Legal hold    Chronic systolic heart failure ([facility])- (present on admission)  Assessment & Plan  Toprol, Lisinopril, Aldactone  Change to oral Lasix 20 mg BID    Chronic atrial fibrillation- (present on admission)  Assessment & Plan  Amiodarone, Toprol  Was not on anticoagulation    Essential hypertension- (present on admission)  Assessment & Plan  Aldactone, Toprol-XL, Lisinopril    Type 2 diabetes mellitus with hyperglycemia, without long-term current use of insulin ([facility])- (present on admission)  Assessment & Plan  Lantus, SSI      Hypomagnesemia- (present on admission)  Assessment & Plan  IV Mg 2 g  Follow level    Hypokalemia- (present on admission)  Assessment & Plan  Follow bmp    Elevated digoxin level  Assessment & Plan  Hold digoxin       VTE prophylaxis: SCD

## 2020-12-11 NOTE — WOUND TEAM
[facility]  Inpatient Services  Wound and Skin Care Progress Note    Admission Date: 11/3/2020     Last order of IP CONSULT TO WOUND CARE was found on 11/16/2020 from Hospital Encounter on 11/3/2020     HPI, PMH, SH: Reviewed    Unit where seen by Wound Team: [id number]     WOUND CONSULT/FOLLOW UP RELATED TO:  Scheduled abdominal NPWT dressing change.  See separate Ostomy note    Self Report / Pain Level:  Premedicated PO, lidocaine to wound bed, adhesive remover used.    OBJECTIVE:  NPWT intact. Colostomy intact. On bariatric DARYL. No longer on Legal hold    WOUND TYPE, LOCATION, CHARACTERISTICS (Pressure Injuries: location, stage, POA or date identified)    Negative Pressure Wound Therapy 11/10/20 Surgical Abdomen Midline (Active)   Vacuum Serial Number [id number] 11/27/20 2100   NPWT Pump Mode / Pressure Setting Ulta;125 mmHg 12/11/20 1130   Dressing Type Medium;Black Foam (Regular) 12/11/20 1130   Number of Foam Pieces Used 2 12/11/20 1130   Canister Changed No 12/11/20 1130   Output (mL) 0 mL 12/11/20 1125   NEXT Dressing Change/Treatment Date 12/1420 12/11/20 1130           Wound 11/10/20 Full Thickness Wound Abdomen Midline wound vac (Active)   Wound Image   12/11/20 1130   Site Assessment Red;Fully granulated    Periwound Assessment Intact    Margins Attached edges    Closure Secondary intention    Drainage Amount None    Drainage Description Serosanguineous    Treatments Site care    Wound Cleansing Approved Wound Cleanser    Periwound Protectant Stoma Powder;Drape;Skin Protectant Wipes to Periwound    Dressing Cleansing/Solutions Not Applicable    Dressing Options Wound Vac    Dressing Changed Changed    Dressing Status Intact    Dressing Change/Treatment Frequency Monday, Wednesday, Friday, and As Needed    NEXT Dressing Change/Treatment Date 12/14/20    NEXT Weekly Photo (Inpatient Only) 12/18/20    Non-staged Wound Description Full thickness    Wound Length (cm) 14 cm    Wound Width (cm) 5 cm     Wound Depth (cm) 1.5 cm    Wound Surface Area (cm^2) 70 cm^2    Wound Volume (cm^3) 105 cm^3    Tunneling (cm) 1 cm    Tunneling Clock Position of Wound 12    Undermining (cm) 0 cm    Shape oval    Wound Odor None    Exposed Structures None      Vascular:    SHAYAN:   No results found.    Lab Values:    Lab Results   Component Value Date/Time    WBC 9.8 12/11/2020 08:48 AM    RBC 4.03 (L) 12/11/2020 08:48 AM    HEMOGLOBIN 10.6 (L) 12/11/2020 08:48 AM    HEMATOCRIT 35.2 (L) 12/11/2020 08:48 AM    CREACTPROT 7.52 (H) 11/29/2020 01:25 AM    SEDRATEWES 13 10/23/2018 08:02 AM    HBA1C 7.8 (H) 11/10/2020 03:31 AM        Culture Results show:  Recent Results (from the past 720 hour(s))   CULTURE WOUND W/ GRAM STAIN    Collection Time: 11/25/20  4:52 PM    Specimen: Wound   Result Value Ref Range    Significant Indicator POS (POS)     Source WND     Site Joya-splenic drain     Culture Result (A)      Growth noted after further incubation, see below for  organism identification.      Gram Stain Result Moderate WBCs.  Rare Gram positive cocci.       Culture Result Enterococcus faecalis  Rare growth   (A)        Susceptibility    Enterococcus faecalis - ISAMAR     Daptomycin 4 Sensitive mcg/mL     Ampicillin <=2 Sensitive mcg/mL     Gent Synergy <=500 Sensitive mcg/mL     Vancomycin 1 Sensitive mcg/mL     Penicillin 2 Sensitive mcg/mL       INTERVENTIONS BY WOUND TEAM:  Topical lidocaine soln instilled in foam and allowed to dwell. Drape removed using adhesive remover, then foam removed, no retained material noted. Wound and periwound cleansed with NS and gauze. Crusted periwound using ostomy powder and no sting barrier. Small pc of gauze into umbilicus. Drape applied to periwound. 1 pc used to bridge superior and inferior wounds. 2nd pc used to fill remaining space, 3rd pc used as button for tracpad. Drape and tracpad to foam. No change in vac settings, see flowsheet for details.    Interdisciplinary consultation: Patient Earline  "RN    EVALUATION / RATIONALE FOR TREATMENT: abdominal wound fully granulated. Inferior wound almost to surface level and can be transition to simple dressing upon next dressing change. Periwound red but intact, crusting performed to decrease risk of breakdown d/t moisture. L flank wound now healed. See note below for ostomy care.        Goals: Steady decrease in wound area and depth weekly.    NURSING PLAN OF CARE ORDERS (X):    Dressing changes: See Dressing Care orders: X  Skin care: See Skin Care orders: X  RN Prevention Protocol: X  Rectal tube care: See Rectal Tube Care orders:   Other orders:       WOUND TEAM PLAN OF CARE:   Dressing changes by wound team:                   Follow up 3 times weekly:                NPWT change 3 times weekly: X    Follow up 1-2 times weekly:      Follow up Bi-Monthly:                   Follow up as needed:       Other (explain):     Anticipated discharge plans:   LTACH:        SNF/Rehab:  X                Home Health Care:           Outpatient Wound Center:            Self Care:               [facility]  Inpatient Services  New Ostomy Management & Teaching    HPI:  Reviewed  PMH: Reviewed   SH: Reviewed  Objective: appliance intact                   Colostomy 11/10/20 Transverse LUQ (Active)   Stomal Appliance Assessment Intact 12/11/20 1130   Stoma Assessment Red 12/11/20 1130   Stoma Shape Budded Less Than One Inch;Oval 12/11/20 1130   Stoma Size (in) 1 12/11/20 1130   Peristomal Assessment Intact 12/11/20 1130   Mucocutaneous Junction Intact 12/11/20 1130   Treatment Appliance Changed 12/11/20 1130   Peristomal Protectant No Sting Skin Prep;Stoma Powder;Paste Ring 12/11/20 1130   Stomal Appliance Paste Ring, 2\";2 1/4\" (57mm) CTF 12/11/20 1130   Output (mL) 90 mL 12/11/20 1130   Output Color Brown 12/11/20 1130   WOUND RN ONLY - Stomal Appliance  2 Piece;2 1/4\" (57mm) CTF;Paste Ring, 2\";Transparent Pouch Lock & Roll 12/11/20 1130   Appliance (Pouch) # [id number] " "12/11/20 1130   Appliance Brand Daisy 12/11/20 1130   Appliance Supplier Dwain 12/11/20 1130   Secure Start completed Yes 12/11/20 1130   Ostomy Care Resources Provided [facility] Tip Sheet 12/11/20 1130      Ostomy Appliance (type and size): 2.24\" 2 pc w/ MS    Interventions: Previous appliance removed, cleansed skin with warm wash cloth, dried. New template made. Ostomy RN cut barrier to fit. Peristomal skin crusted.This RN applied paste ring to barrier and then applied barrier to skin. Used friction to adhered barrier to skin. Pouch attached to barrier and pouch end closed     Pt education: Questions and concerns addressed    Evaluation: Stoma viable, producing gas and stool. Pt requiring staff assistance to perform ostomy care. Likely discharging to SNF.    Plan: Ostomy nurses to continue to follow for ostomy needs and teaching     Anticipated discharge needs: Supplies, supplier information, possible HH or outpatient ostomy clinic   "

## 2020-12-11 NOTE — PROGRESS NOTES
Received report from ELISA Patten. Assumed care at 1915. This pt is AOx4, ambulatory with x2 assistance, denies N/V this evening, (+) flatus in bag, hypoactive bs, voiding with frye, reporting abdominal pain that is 3/10, patient denying any interventions at this time. Midline with wound vac, LLQ ostomy (+) output, (L) side IR drain, Patient and RN discussed plan of care, all questions answered. Labs noted, assessment complete, patient tolerating regular diet. Call light in place, personal belongings available, bed in lowest position, upper bedrails up, patient educated on importance of calling for assistance, hourly rounding in place. No additional needs at this time. VSS

## 2020-12-11 NOTE — PROGRESS NOTES
Pharmacy Kinetics 55 y.o. male on vancomycin day # 24 (2020)    Currently on Vancomycin  1000mg iv q24hr  Provider specified end date: 20     Indication for Treatment: IAI     Pertinent history per medical record: Admitted on 11/3/2020 for  concern of abdominal pain after surgical intervention. Concern for subsequent colonic perforation and possible abscess. ID and surgery consulted.     Other antibiotics: Ceftriaxone 2 grams q24H, fluconazole 400 mg q24H     Allergies: Daptomycin, Pcn [penicillins], Unasyn [ampicillin-sulbactam sodium], and Vancomycin   List concerns for renal function: BMI 58.16 kg/m2; albumin <3; abnormal LFTs     Pertinent cultures to date:   20 - wound (vj-splenic drain): E faecalis  11/10/20 - colon perforation: E faecalis; C albicans     MRSA nares swab if pneumonia is a concern (ordered/positive/negative/n-a): N/A       Recent Labs     20  0625 12/10/20  0445 20  0848   WBC 10.4 9.6 9.8   NEUTSPOLYS 52.00 52.20 49.30     Recent Labs     20  0625 12/10/20  0445 20  0848   BUN 17 19 20   CREATININE 0.89 1.04 1.00     No results for input(s): VANCOTROUGH, VANCOPEAK, VANCORANDOM in the last 72 hours.    Intake/Output Summary (Last 24 hours) at 2020 1208  Last data filed at 2020 1125  Gross per 24 hour   Intake 600 ml   Output 2151 ml   Net -1551 ml      /75   Pulse (!) 103   Temp 36.3 °C (97.3 °F) (Temporal)   Resp 19   Ht 1.829 m (6')   Wt (!) 199 kg (438 lb 11.5 oz)   SpO2 95%  Temp (24hrs), Av.2 °C (97.2 °F), Min:35.9 °C (96.7 °F), Max:36.4 °C (97.6 °F)      A/P   1. Vancomycin dose change: Not indicated at this time- continue with vancomycin 1000 mg q24H  2. Next vancomycin level: Not clinically indicated- patient completing therapy today  3. Goal trough: 10-15 mcg/mL  4. Comments: Last dose of vancomycin today. Scr slight down-trend. Should therapy be extended pharmacy will continue to monitor.    Bee Ramsey,  PharmD

## 2020-12-12 LAB
ANION GAP SERPL CALC-SCNC: 10 MMOL/L (ref 7–16)
BASOPHILS # BLD AUTO: 1 % (ref 0–1.8)
BASOPHILS # BLD: 0.09 K/UL (ref 0–0.12)
BUN SERPL-MCNC: 20 MG/DL (ref 8–22)
CALCIUM SERPL-MCNC: 9.2 MG/DL (ref 8.5–10.5)
CHLORIDE SERPL-SCNC: 95 MMOL/L (ref 96–112)
CO2 SERPL-SCNC: 26 MMOL/L (ref 20–33)
CREAT SERPL-MCNC: 0.9 MG/DL (ref 0.5–1.4)
EOSINOPHIL # BLD AUTO: 0.6 K/UL (ref 0–0.51)
EOSINOPHIL NFR BLD: 6.6 % (ref 0–6.9)
ERYTHROCYTE [DISTWIDTH] IN BLOOD BY AUTOMATED COUNT: 59.6 FL (ref 35.9–50)
GLUCOSE BLD-MCNC: 106 MG/DL (ref 65–99)
GLUCOSE BLD-MCNC: 114 MG/DL (ref 65–99)
GLUCOSE BLD-MCNC: 123 MG/DL (ref 65–99)
GLUCOSE BLD-MCNC: 144 MG/DL (ref 65–99)
GLUCOSE SERPL-MCNC: 112 MG/DL (ref 65–99)
HCT VFR BLD AUTO: 34.2 % (ref 42–52)
HGB BLD-MCNC: 10.4 G/DL (ref 14–18)
IMM GRANULOCYTES # BLD AUTO: 0.07 K/UL (ref 0–0.11)
IMM GRANULOCYTES NFR BLD AUTO: 0.8 % (ref 0–0.9)
LYMPHOCYTES # BLD AUTO: 2.98 K/UL (ref 1–4.8)
LYMPHOCYTES NFR BLD: 33 % (ref 22–41)
MAGNESIUM SERPL-MCNC: 2 MG/DL (ref 1.5–2.5)
MCH RBC QN AUTO: 26.2 PG (ref 27–33)
MCHC RBC AUTO-ENTMCNC: 30.4 G/DL (ref 33.7–35.3)
MCV RBC AUTO: 86.1 FL (ref 81.4–97.8)
MONOCYTES # BLD AUTO: 0.84 K/UL (ref 0–0.85)
MONOCYTES NFR BLD AUTO: 9.3 % (ref 0–13.4)
NEUTROPHILS # BLD AUTO: 4.45 K/UL (ref 1.82–7.42)
NEUTROPHILS NFR BLD: 49.3 % (ref 44–72)
NRBC # BLD AUTO: 0 K/UL
NRBC BLD-RTO: 0 /100 WBC
NT-PROBNP SERPL IA-MCNC: 2615 PG/ML (ref 0–125)
PHOSPHATE SERPL-MCNC: 4.2 MG/DL (ref 2.5–4.5)
PLATELET # BLD AUTO: 470 K/UL (ref 164–446)
PMV BLD AUTO: 10 FL (ref 9–12.9)
POTASSIUM SERPL-SCNC: 4.2 MMOL/L (ref 3.6–5.5)
RBC # BLD AUTO: 3.97 M/UL (ref 4.7–6.1)
SODIUM SERPL-SCNC: 131 MMOL/L (ref 135–145)
WBC # BLD AUTO: 9 K/UL (ref 4.8–10.8)

## 2020-12-12 PROCEDURE — A9270 NON-COVERED ITEM OR SERVICE: HCPCS | Performed by: STUDENT IN AN ORGANIZED HEALTH CARE EDUCATION/TRAINING PROGRAM

## 2020-12-12 PROCEDURE — 83880 ASSAY OF NATRIURETIC PEPTIDE: CPT

## 2020-12-12 PROCEDURE — 99232 SBSQ HOSP IP/OBS MODERATE 35: CPT | Performed by: FAMILY MEDICINE

## 2020-12-12 PROCEDURE — A9270 NON-COVERED ITEM OR SERVICE: HCPCS | Performed by: FAMILY MEDICINE

## 2020-12-12 PROCEDURE — 80048 BASIC METABOLIC PNL TOTAL CA: CPT

## 2020-12-12 PROCEDURE — 36415 COLL VENOUS BLD VENIPUNCTURE: CPT

## 2020-12-12 PROCEDURE — 700102 HCHG RX REV CODE 250 W/ 637 OVERRIDE(OP): Performed by: STUDENT IN AN ORGANIZED HEALTH CARE EDUCATION/TRAINING PROGRAM

## 2020-12-12 PROCEDURE — 700102 HCHG RX REV CODE 250 W/ 637 OVERRIDE(OP): Performed by: FAMILY MEDICINE

## 2020-12-12 PROCEDURE — 83735 ASSAY OF MAGNESIUM: CPT

## 2020-12-12 PROCEDURE — 770020 HCHG ROOM/CARE - TELE (206)

## 2020-12-12 PROCEDURE — 85025 COMPLETE CBC W/AUTO DIFF WBC: CPT

## 2020-12-12 PROCEDURE — 84100 ASSAY OF PHOSPHORUS: CPT

## 2020-12-12 PROCEDURE — 82962 GLUCOSE BLOOD TEST: CPT | Mod: 91

## 2020-12-12 RX ORDER — AMIODARONE HYDROCHLORIDE 200 MG/1
200 TABLET ORAL DAILY
Qty: 30 TAB | Status: ON HOLD
Start: 2020-12-13 | End: 2021-02-05

## 2020-12-12 RX ORDER — TRAZODONE HYDROCHLORIDE 50 MG/1
50 TABLET ORAL
Qty: 30 TAB | Refills: 3 | Status: ON HOLD
Start: 2020-12-12 | End: 2021-02-05

## 2020-12-12 RX ORDER — MODAFINIL 100 MG/1
100 TABLET ORAL EVERY MORNING
Qty: 3 TAB | Refills: 0 | Status: SHIPPED | OUTPATIENT
Start: 2020-12-13 | End: 2020-12-16

## 2020-12-12 RX ORDER — FUROSEMIDE 20 MG/1
20 TABLET ORAL 2 TIMES DAILY
Qty: 60 TAB | Status: ON HOLD
Start: 2020-12-12 | End: 2021-02-05

## 2020-12-12 RX ORDER — HYDROCODONE BITARTRATE AND ACETAMINOPHEN 5; 325 MG/1; MG/1
1 TABLET ORAL EVERY 6 HOURS PRN
Qty: 12 TAB | Refills: 0 | Status: SHIPPED | OUTPATIENT
Start: 2020-12-12 | End: 2020-12-17 | Stop reason: SDUPTHER

## 2020-12-12 RX ORDER — RISPERIDONE 0.5 MG/1
0.5 TABLET ORAL EVERY MORNING
Qty: 60 TAB | Refills: 3 | Status: SHIPPED
Start: 2020-12-13 | End: 2021-02-01

## 2020-12-12 RX ORDER — RISPERIDONE 1 MG/1
1 TABLET ORAL EVERY EVENING
Qty: 60 TAB | Refills: 3 | Status: ON HOLD
Start: 2020-12-12 | End: 2021-02-05

## 2020-12-12 RX ORDER — FAMOTIDINE 20 MG/1
20 TABLET, FILM COATED ORAL DAILY
Qty: 60 TAB | Status: ON HOLD
Start: 2020-12-13 | End: 2021-02-05

## 2020-12-12 RX ORDER — INSULIN GLARGINE 100 [IU]/ML
15 INJECTION, SOLUTION SUBCUTANEOUS EVERY MORNING
Qty: 10 ML | Status: ON HOLD
Start: 2020-12-13 | End: 2021-02-05 | Stop reason: SDUPTHER

## 2020-12-12 RX ORDER — CETIRIZINE HYDROCHLORIDE 10 MG/1
10 TABLET ORAL DAILY
Qty: 30 TAB | Status: ON HOLD
Start: 2020-12-13 | End: 2021-02-05

## 2020-12-12 RX ORDER — HYDROCODONE BITARTRATE AND ACETAMINOPHEN 5; 325 MG/1; MG/1
1 TABLET ORAL EVERY 6 HOURS PRN
Qty: 12 TAB | Refills: 0 | Status: SHIPPED | OUTPATIENT
Start: 2020-12-12 | End: 2020-12-12 | Stop reason: SDUPTHER

## 2020-12-12 RX ORDER — ACETAMINOPHEN 325 MG/1
650 TABLET ORAL EVERY 6 HOURS PRN
Qty: 30 TAB | Refills: 0 | Status: ON HOLD
Start: 2020-12-12 | End: 2021-02-05

## 2020-12-12 RX ORDER — LEVOTHYROXINE SODIUM 112 UG/1
112 TABLET ORAL
Qty: 30 TAB | Status: ON HOLD
Start: 2020-12-13 | End: 2021-02-05 | Stop reason: SDUPTHER

## 2020-12-12 RX ADMIN — RISPERIDONE 1 MG: 1 TABLET ORAL at 17:43

## 2020-12-12 RX ADMIN — CETIRIZINE HYDROCHLORIDE 10 MG: 10 TABLET, FILM COATED ORAL at 05:32

## 2020-12-12 RX ADMIN — METOPROLOL SUCCINATE 50 MG: 50 TABLET, EXTENDED RELEASE ORAL at 05:32

## 2020-12-12 RX ADMIN — RISPERIDONE 0.5 MG: 1 TABLET ORAL at 05:32

## 2020-12-12 RX ADMIN — INSULIN GLARGINE 15 UNITS: 100 INJECTION, SOLUTION SUBCUTANEOUS at 05:38

## 2020-12-12 RX ADMIN — METOPROLOL SUCCINATE 50 MG: 50 TABLET, EXTENDED RELEASE ORAL at 17:43

## 2020-12-12 RX ADMIN — FUROSEMIDE 20 MG: 20 TABLET ORAL at 05:32

## 2020-12-12 RX ADMIN — MODAFINIL 100 MG: 100 TABLET ORAL at 05:32

## 2020-12-12 RX ADMIN — HYDROCODONE BITARTRATE AND ACETAMINOPHEN 1 TABLET: 5; 325 TABLET ORAL at 19:09

## 2020-12-12 RX ADMIN — LEVOTHYROXINE SODIUM 112 MCG: 112 TABLET ORAL at 05:33

## 2020-12-12 RX ADMIN — SPIRONOLACTONE 25 MG: 50 TABLET ORAL at 05:32

## 2020-12-12 RX ADMIN — AMIODARONE HYDROCHLORIDE 200 MG: 200 TABLET ORAL at 05:32

## 2020-12-12 RX ADMIN — FUROSEMIDE 20 MG: 20 TABLET ORAL at 16:15

## 2020-12-12 RX ADMIN — LISINOPRIL 10 MG: 10 TABLET ORAL at 05:32

## 2020-12-12 RX ADMIN — FAMOTIDINE 20 MG: 20 TABLET, FILM COATED ORAL at 05:32

## 2020-12-12 ASSESSMENT — ENCOUNTER SYMPTOMS
SENSORY CHANGE: 0
FOCAL WEAKNESS: 0
HEADACHES: 0
ABDOMINAL PAIN: 0
FLANK PAIN: 0
SPEECH CHANGE: 0
PALPITATIONS: 0
VOMITING: 0
SORE THROAT: 0
WEAKNESS: 1
CHILLS: 0
HEARTBURN: 0
NERVOUS/ANXIOUS: 0
COUGH: 0
NECK PAIN: 0
SHORTNESS OF BREATH: 0
BACK PAIN: 0
NAUSEA: 0
BLURRED VISION: 0
DIAPHORESIS: 0
WHEEZING: 0
DIARRHEA: 0
FEVER: 0
MYALGIAS: 0
DIZZINESS: 0

## 2020-12-12 ASSESSMENT — PAIN DESCRIPTION - PAIN TYPE
TYPE: ACUTE PAIN;SURGICAL PAIN
TYPE: ACUTE PAIN

## 2020-12-12 ASSESSMENT — FIBROSIS 4 INDEX: FIB4 SCORE: 0.73

## 2020-12-12 NOTE — PROGRESS NOTES
Hospital Medicine Daily Progress Note    Date of Service  12/12/2020    Chief Complaint  55 y.o. male admitted 11/3/2020 with perforated colon.    Hospital Course  Admitted as a transfer, was noted to have splenic fluid collection.  Surgery was consulted on the case, CT-guided drain was done.  Further work-up revealed that he had a perforated colon.  Patient underwent segmental colectomy, colostomy creation, mobilization of splenic flexure, wound VAC placement, irrigation debridement of flank wound on 11/10/2020.  His culture showed Enterococcus and Candida.  His blood culture in 1 bottle showed Streptococcus viridans. Infectious disease was consulted on the case.  He was placed on antibiotics the form of IV Vancomycin, Rocephin, Diflucan. On  admission he also had acute kidney injury and hyperkalemia, Nephrology was consulted on the case and these have resolved. He has known history of congestive heart failure and cardiomyopathy, chronic atrial fibrillation, had episodes of rapid ventricular rate and Cardiology was consulted on the case.  He also had an episode of suicidal ideation and he was placed on legal hold.  Psychiatry was consulted on the case, he has been taken off legal hold at this point.  Physical therapy and Occupational Therapy came to evaluate him and they recommend continued rehabitation skilled nursing facility. He vontinues to require wound care with the wound VAC still in place.  Also has a drain still in place at the left upper quadrant area, he will need to follow-up with surgery for this.    Interval Problem Update  Perforated colon - pain controlled, tolerating regular diet  Abdominal abscess - culture showed Enterococcus and Candida  Bacteremia - Streptococcus viridans, Echocardiogram and repeat cultures negative  Diabetes - 110-140  HTN - sbp 100-140  CHF/CM - probnp 2600  Afib - rate     Consultants/Specialty  Surgery  ID  Nephrology  Cardiology  Psychiatry    Code Status  Full  Code    Disposition  Legal hold discontinued  Medically cleared   SNF - awaiting bed availability    Review of Systems  Review of Systems   Constitutional: Positive for malaise/fatigue. Negative for chills, diaphoresis and fever.   HENT: Negative for congestion, hearing loss and sore throat.    Eyes: Negative for blurred vision.   Respiratory: Negative for cough, shortness of breath and wheezing.    Cardiovascular: Negative for chest pain, palpitations and leg swelling.   Gastrointestinal: Negative for abdominal pain, diarrhea, heartburn, nausea and vomiting.   Genitourinary: Negative for dysuria, flank pain and hematuria.   Musculoskeletal: Negative for back pain, joint pain, myalgias and neck pain.   Skin: Negative for rash.   Neurological: Positive for weakness. Negative for dizziness, sensory change, speech change, focal weakness and headaches.   Psychiatric/Behavioral: Negative for suicidal ideas. The patient is not nervous/anxious.         Physical Exam  Temp:  [36.1 °C (97 °F)-36.5 °C (97.7 °F)] 36.3 °C (97.4 °F)  Pulse:  [] 96  Resp:  [16-80] 80  BP: ()/(45-88) 113/74  SpO2:  [90 %-96 %] 90 %    Physical Exam  Vitals signs and nursing note reviewed.   Constitutional:       Appearance: He is obese.   HENT:      Head: Normocephalic and atraumatic.      Nose: No congestion.      Mouth/Throat:      Mouth: Mucous membranes are moist.   Eyes:      Extraocular Movements: Extraocular movements intact.      Conjunctiva/sclera: Conjunctivae normal.      Pupils: Pupils are equal, round, and reactive to light.   Neck:      Musculoskeletal: No muscular tenderness.   Cardiovascular:      Rate and Rhythm: Normal rate. Rhythm irregularly irregular.   Pulmonary:      Effort: Pulmonary effort is normal.      Breath sounds: Normal breath sounds.   Abdominal:      General: There is no distension.      Tenderness: There is no abdominal tenderness. There is no guarding or rebound.      Comments: Wound vac at  midline  Ostomy  Drain at LUQ   Musculoskeletal:      Right lower leg: Edema present.      Left lower leg: Edema present.   Lymphadenopathy:      Cervical: No cervical adenopathy.   Skin:     General: Skin is warm and dry.   Neurological:      General: No focal deficit present.      Mental Status: He is alert and oriented to person, place, and time.      Cranial Nerves: No cranial nerve deficit.   Psychiatric:         Thought Content: Thought content does not include suicidal ideation. Thought content does not include suicidal plan.         Fluids    Intake/Output Summary (Last 24 hours) at 12/12/2020 1207  Last data filed at 12/12/2020 0756  Gross per 24 hour   Intake 1385 ml   Output 1880 ml   Net -495 ml       Laboratory  Recent Labs     12/10/20  0445 12/11/20  0848 12/12/20  0348   WBC 9.6 9.8 9.0   RBC 4.15* 4.03* 3.97*   HEMOGLOBIN 10.7* 10.6* 10.4*   HEMATOCRIT 36.3* 35.2* 34.2*   MCV 87.5 87.3 86.1   MCH 25.8* 26.3* 26.2*   MCHC 29.5* 30.1* 30.4*   RDW 61.9* 61.7* 59.6*   PLATELETCT 577* 508* 470*   MPV 9.8 9.5 10.0     Recent Labs     12/10/20  0445 12/11/20  0848 12/12/20  0348   SODIUM 134* 134* 131*   POTASSIUM 3.7 4.1 4.2   CHLORIDE 96 95* 95*   CO2 30 29 26   GLUCOSE 105* 117* 112*   BUN 19 20 20   CREATININE 1.04 1.00 0.90   CALCIUM 9.2 9.2 9.2                   Imaging  IR-US GUIDED PIV   Final Result    Ultrasound-guided PERIPHERAL IV INSERTION performed by    qualified nursing staff as above.      CT-ABDOMEN-PELVIS WITH   Final Result      1.  Interval placement of a surgical drain into a splenic fluid collection. The fluid collection has decreased in size with a small amount of residual fluid seen surrounding the percutaneous catheter drain tip.      2.  No evidence of bowel obstruction.      3.  Left lower quadrant ostomy site.      4.  Bibasilar atelectasis with small bilateral pleural effusions.      5.  Gallstones.      6.  Fatty liver.      7.  Anasarca.      8.  Small amount of ascites.       IR-MIDLINE CATHETER INSERTION WO GUIDANCE > AGE 3   Final Result                  Ultrasound-guided midline placement performed by qualified nursing staff    as above.          CT-DRAIN-PERITONEAL   Final Result      Successful splenic abscess drainage tube placement.      Plan: Twice daily flushes with 10 mL of sterile saline. Monitor outputs.         CT-ABDOMEN-PELVIS WITH   Final Result      1.  Intraparenchymal collection in the anterior aspect of the spleen has increased in size from the prior exam.      2.  Small amount of free fluid in the pelvis has increased from the prior exam.      3.  Small bilateral pleural effusions layer posteriorly, left greater than right, slightly decreased from the prior exam. Adjacent airspace disease is likely atelectasis.      4.  Cholelithiasis.      5.  Right renal scarring.      [id number]-2 VIEW   Final Result      1.  No evidence of bowel obstruction.   2.  Postoperative changes as described.      CT-ABDOMEN-PELVIS W/O   Final Result      Postsurgical changes with a left abdominal stoma. Parastomal densities may be related to postsurgical hematoma measuring up to 2.8 x 2.1 cm.      Small amount of free fluid in the abdomen and pelvis.      Collection along the anterior spleen measures approximately 2.9 x 4.3 cm is decreased in size compared to the prior examination. Evaluation is limited by lack of intravenous contrast.      Third spacing.      Trace free intraperitoneal air is likely related to recent surgery.      Moderate left and small right pleural effusions with overlying atelectasis/consolidation.      Cortical scarring of the right kidney.      Cholelithiasis.         DX-CHEST-PORTABLE (1 VIEW)   Final Result      1.  Bilateral airspace and interstitial infiltrates.      2.  Cardiomegaly.      US-RENAL   Final Result    Examination was limited due to poor sound penetration related to body habitus.   Grossly normal appearance of the kidneys.      EC-ECHOCARDIOGRAM  COMPLETE W/ CONT   Final Result      DX-CHEST-PORTABLE (1 VIEW)   Final Result      No pneumothorax identified following right subclavian line placement      Worsening atelectasis with consolidation at the bases not excluded      Stable cardiac silhouette enlargement      CT-ABDOMEN-PELVIS WITH   Final Result      Interval decrease in size of perisplenic abscess. Pigtail catheter now in place      Abscess has likely fistulous communication with the abutting splenic flexure of the colon. Colon is otherwise normal in appearance      Mild hepatosplenomegaly      Decreasing small left pleural effusion      Subacute anterior wedge compression fractures in the caudal thoracic spine      CT-DRAIN-PERITONEAL   Final Result      1.  CT guided perisplenic abscess catheter drainage.   2.  The current plan is to obtain a follow-up CT in 5-7 days..      OUTSIDE IMAGES-CT CHEST   Final Result      OUTSIDE IMAGES-CT ABDOMEN /PELVIS   Final Result      CT-ABDOMEN-PELVIS WITH   Final Result   Addendum 1 of 1   Addendum:   A prior noncontrast CT of the abdomen and pelvis from [facility] dated 10/19/2020 was made available for comparison. On the    prior study a perisplenic/subcapsular fluid collection was seen however    did not contain air at that time and    there was no discrete evidence of communication with the transverse colon.    Communication with splenic flexure of the colon and air within the    collection are new from the prior study.      Final      DX-CHEST-PORTABLE (1 VIEW)   Final Result      Slight interval interval improvement in congestive heart failure.           Assessment/Plan  * Spontaneous perforation of colon (HCC)- (present on admission)  Assessment & Plan  Segmental colectomy, colostomy creation, mobilization of splenic flexure, wound VAC placement, irrigation and debridement of flank wound 11/10/2020  IV Vancomycin, Rocephin, Diflucan until 12/11/2020  Wound care  Pain  control    Gram-positive bacteremia- (present on admission)  Assessment & Plan  Finished course of IV Vancomycin and Rocephin    Intra-abdominal abscess (HCC)- (present on admission)  Assessment & Plan  CT-guided drain 11/25/2020  CT guided Drain Placement 11/3/2020  Finished course of IV Vancomycin, Rocephin, Diflucan     SRUTHI (acute kidney injury) ([facility])  Assessment & Plan  Follow BMP    Sepsis ([facility])  Assessment & Plan  Secondary to perforation of colon, splenic abscess    Anxiety and depression- (present on admission)  Assessment & Plan  Risperdal, Trazodone    Hypothyroidism- (present on admission)  Assessment & Plan  Levothyroxine 112 mcg   Check TSH on 12/21/2020    Prolonged QT interval- (present on admission)  Assessment & Plan  Cardiac monitor  follow EKG    NICM (nonischemic cardiomyopathy) ([facility])- (present on admission)  Assessment & Plan  Toprol, Lisinopril, Lasix, Aldactone    Suicidal ideation  Assessment & Plan  Off Legal hold    Chronic systolic heart failure ([facility])- (present on admission)  Assessment & Plan  Toprol, Lisinopril, Aldactone, Lasix     Chronic atrial fibrillation- (present on admission)  Assessment & Plan  Amiodarone, Toprol  Was not on anticoagulation    Essential hypertension- (present on admission)  Assessment & Plan  Aldactone, Toprol-XL, Lisinopril    Type 2 diabetes mellitus with hyperglycemia, without long-term current use of insulin ([facility])- (present on admission)  Assessment & Plan  Lantus, SSI      Hypomagnesemia- (present on admission)  Assessment & Plan  IV Mg given  Follow level    Hypokalemia- (present on admission)  Assessment & Plan  Follow bmp    Elevated digoxin level  Assessment & Plan  Hold digoxin       VTE prophylaxis: SCD

## 2020-12-12 NOTE — PROGRESS NOTES
Received report from ELISA Patten. Assumed care at 1915. This pt is AOx4, ambulatory with x2 assistance, denies N/V this evening, (+) flatus in ostomy, hypoactive bs, voiding with frye, reporting abdominal pain that is 4/10, patient denying any interventions at this time. Midline with wound vac, CDI + working, LLQ ostomy (+) output, (L) sided abdominal IR drain flushed with (+) output. Patient and RN discussed plan of care, all questions answered. Labs noted, assessment complete, patient tolerating regular diet. Call light in place, personal belongings available, bed in lowest position, x3 bedrails up, patient educated on importance of calling for assistance, hourly rounding in place. No additional needs at this time. VSS.

## 2020-12-12 NOTE — PROGRESS NOTES
Assumed care of patient at 0700. Patient is alert and oriented, respirations are unlabored and regular. Lung sounds clear throughout, diminished in bilateral bases, on room air. Abdomen soft, tender, midline wound vac secured appropriately, left abdominal IR drain with minimal output, left ostomy, +loose black/brown output. +bowel sounds. [place]abala in place for retention, clear yellow output. Tele monitor. Low air loss bed, interdry to folds, barrier cream to sacrum, bilateral heel mepilex, feet dry/flaky, bed in lowest position, call light within reach, patient states no needs at this time.

## 2020-12-12 NOTE — DISCHARGE SUMMARY
Discharge Summary    CHIEF COMPLAINT ON ADMISSION  Chief Complaint   Patient presents with   • Nausea     Recently admitted and discharged home on abx - reports nausea this am; 4mg zofran given in route   • Atrial Fibrillation     hx of afib w/rvr       Reason for Admission  EMS     CODE STATUS  Full Code    HPI & HOSPITAL COURSE  This is a 55 y.o. male here as a transfer, was noted to have splenic fluid collection. Surgery was consulted on the case, CT-guided drain was done.  Further work-up revealed that he had a perforated colon.  Patient underwent segmental colectomy, colostomy creation, mobilization of splenic flexure, wound VAC placement, irrigation debridement of flank wound on 11/10/2020.  His culture showed Enterococcus and Candida.  His blood culture in 1 bottle showed Streptococcus viridans. Infectious disease was consulted on the case.  He was placed on antibiotics the form of IV Vancomycin, Rocephin, Diflucan for which he has finished the course. On admission he also had acute kidney injury and hyperkalemia, Nephrology was consulted on the case and these have resolved. He has known history of congestive heart failure and cardiomyopathy, chronic atrial fibrillation, had episodes of rapid ventricular rate and Cardiology was consulted on the case.  He also had an episode of suicidal ideation and he was placed on legal hold.  Psychiatry was consulted on the case, he has been taken off legal hold at this point.  Physical therapy and Occupational Therapy came to evaluate him and they recommend continued rehabitation skilled nursing facility. He vontinues to require wound care with the wound VAC still in place.  Also has a drain still in place at the left upper quadrant area, he will need to follow-up with surgery for this. He was also noted to by Hypothyroid and started on Levothyroxine, he will need his TSH rechecked on 12/21/2020    Patient remained stable. He has a wound vac still in place. Slowly getting  stronger. Needs ongoing rehab.     Therefore, he is discharged in good and stable condition to skilled nursing facility.    The patient met 2-midnight criteria for an inpatient stay at the time of discharge.      FOLLOW UP ITEMS POST DISCHARGE  Follow-up with surgery    DISCHARGE DIAGNOSES  Principal Problem (Resolved):    Spontaneous perforation of colon (HCC) POA: Yes  Active Problems:    Type 2 diabetes mellitus with hyperglycemia, without long-term current use of insulin (HCC) POA: Yes    Essential hypertension POA: Yes    Chronic atrial fibrillation POA: Yes    Urinary retention POA: Yes      Overview: IMO load March 2020    Chronic systolic heart failure (HCC) POA: Yes    NICM (nonischemic cardiomyopathy) (HCC) POA: Yes    Prolonged QT interval POA: Yes    Hypothyroidism POA: Yes    Anxiety and depression POA: Yes  Resolved Problems:    Sepsis (HCC) POA: Yes    SRUTHI (acute kidney injury) (HCC) POA: No    Intra-abdominal abscess (HCC) POA: Yes    Gram-positive bacteremia POA: Yes    Hypokalemia POA: Yes    Hypomagnesemia POA: Yes    Suicidal ideation POA: No    Elevated digoxin level POA: No      FOLLOW UP  No future appointments.  [facility]  [address] [place] [address]  [phone]        Kin Desouza D.O.  [address]  [place] [id number] [phone]    Schedule an appointment as soon as possible for a visit in 1 week  after discharge      MEDICATIONS ON DISCHARGE     Medication List      START taking these medications      Instructions   acetaminophen 325 MG Tabs  Commonly known as: Tylenol   Take 2 Tabs by mouth every 6 hours as needed.  Dose: 650 mg     albuterol 2.5mg/0.5ml Nebu  Commonly known as: PROVENTIL   Take 0.5 mL by nebulization every 6 hours as needed for Shortness of Breath.  Dose: 2.5 mg     amiodarone 200 MG Tabs  Commonly known as: Cordarone   Take 1 Tab by mouth every day.  Dose: 200 mg     cetirizine 10 MG Tabs  Commonly known as: ZYRTEC    Take 1 Tab by mouth every day.  Dose: 10 mg     famotidine 20 MG Tabs  Commonly known as: PEPCID   Take 1 Tab by mouth every day.  Dose: 20 mg     HYDROcodone-acetaminophen 5-325 MG Tabs per tablet  Commonly known as: NORCO   Take 1 Tab by mouth every 6 hours as needed for up to 3 days.  Dose: 1 Tab     insulin glargine 100 UNIT/ML Soln  Commonly known as: Lantus   Inject 15 Units under the skin every morning.  Dose: 15 Units     insulin regular 100 Unit/mL Soln  Commonly known as: HumuLIN R   Inject 3-14 Units under the skin every 6 hours.  Dose: 3-14 Units     levothyroxine 112 MCG Tabs  Commonly known as: SYNTHROID   Take 1 Tab by mouth Every morning on an empty stomach.  Dose: 112 mcg     * risperiDONE 1 MG Tabs  Commonly known as: RISPERDAL   Take 1 Tab by mouth every evening.  Dose: 1 mg     * risperiDONE 0.5 MG Tabs  Commonly known as: RISPERDAL   Take 1 Tab by mouth every morning.  Dose: 0.5 mg     tamsulosin 0.4 MG capsule  Start taking on: December 18, 2020  Commonly known as: FLOMAX   Take 1 Cap by mouth 1/2 hour after breakfast.  Dose: 0.4 mg     traZODone 50 MG Tabs  Commonly known as: DESYREL   Take 1 Tab by mouth every bedtime.  Dose: 50 mg         * This list has 2 medication(s) that are the same as other medications prescribed for you. Read the directions carefully, and ask your doctor or other care provider to review them with you.            CHANGE how you take these medications      Instructions   furosemide 20 MG Tabs  What changed:   · medication strength  · how much to take  · when to take this  Commonly known as: LASIX   Take 1 Tab by mouth 2 Times a Day.  Dose: 20 mg     lisinopril 2.5 MG Tabs  Start taking on: December 18, 2020  What changed:   · medication strength  · how much to take  Commonly known as: PRINIVIL   Take 1 Tab by mouth every day.  Dose: 2.5 mg     metoprolol SR 50 MG Tb24  What changed:   · medication strength  · when to take this  Commonly known as: TOPROL XL   Take 1 Tab  by mouth 2 Times a Day.  Dose: 50 mg     spironolactone 25 MG Tabs  Start taking on: December 18, 2020  What changed: how much to take  Commonly known as: ALDACTONE   Take 0.5 Tabs by mouth every day.  Dose: 12.5 mg        STOP taking these medications    aspirin 81 MG EC tablet     cefdinir 300 MG Caps  Commonly known as: OMNICEF     doxycycline monohydrate 100 MG tablet  Commonly known as: ADOXA     metroNIDAZOLE 500 MG Tabs  Commonly known as: FLAGYL     olanzapine 7.5 MG tablet  Commonly known as: ZYPREXA     sertraline 100 MG Tabs  Commonly known as: Zoloft     sertraline 25 MG tablet  Commonly known as: Zoloft        ASK your doctor about these medications      Instructions   modafinil 100 MG Tabs  Commonly known as: PROVIGIL  Ask about: Should I take this medication?   Take 1 Tab by mouth every morning for 3 days.  Dose: 100 mg            Allergies  Allergies   Allergen Reactions   • Daptomycin Rash     Body rash  RXN=1/2018     • Pcn [Penicillins] Hives and Rash      Rash & hives  RXN=since a kid  Tolerates cephalosporins   • Unasyn [Ampicillin-Sulbactam Sodium] Hives, Itching and Swelling   • Vancomycin Rash     Red man syndrome. Tolerates IV vancomycin at reduced infusion rate.       DIET  Orders Placed This Encounter   Procedures   • Diet Order Diet: Consistent CHO (Diabetic); Tray Modifications (optional): No Sharps (Paperware)     Paperware only on meal tray.     Standing Status:   Standing     Number of Occurrences:   1     Order Specific Question:   Diet:     Answer:   Consistent CHO (Diabetic) [4]     Order Specific Question:   Tray Modifications (optional)     Answer:   No Sharps (Paperware)       ACTIVITY  As tolerated and directed by skilled nursing.  Weight bearing as tolerated    LINES, DRAINS, AND WOUNDS  This is an automated list. Peripheral IVs will be removed prior to discharge.  Peripheral IV 12/10/20 4 Fr Right Forearm (Active)   Site Assessment Clean;Dry;Intact 12/11/20 0800   Dressing  Type Transparent 12/11/20 0800   Line Status Blood return noted;Flushed;Saline locked 12/11/20 0800   Dressing Status Clean;Dry;Intact 12/11/20 0800   Dressing Intervention Initial dressing 12/11/20 0800   Dressing Change Due 12/12/20 12/11/20 0800   Date Primary Tubing Changed 12/10/20 12/11/20 0800   Date Secondary Tubing Changed 12/11/20 12/11/20 0800   NEXT Primary Tubing Change  12/12/20 12/11/20 0800   NEXT Secondary Tubing Change  12/12/20 12/11/20 0800   [facility] 0 12/11/20 0800   Phlebitis Scale ([facility] Only) 0 12/11/20 0800     Closed/Suction Drain Lateral LLQ Locking Loop Catheter 12 Fr. (Active)   Site Description Unable to view 12/11/20 2045   Dressing Type Gauze;Tape 12/11/20 2045   Dressing Status Clean;Dry;Intact 12/11/20 2045   Drainage Appearance Serosanguineous 12/11/20 2045   Tube Status To bulb suction 12/11/20 2045   Output (mL) 0 mL 12/11/20 1600       Urethral Catheter (Active)   Site Assessment Clean;Skin intact 12/11/20 2045   Collection Container Standard drainage bag 12/11/20 2045   Urinary Catheter Care Tamper Evident Seal in Place;Drainage Tube Extended;Drainage Tubing Properly Secured;Drainage Bag Not Overfilled;Drainage Bag Below Bladder Level and Not on Floor 12/11/20 2045   Securement Method Securing device (Describe) 12/11/20 2045   Output (mL) 600 mL 12/12/20 0500      Wound 11/10/20 Full Thickness Wound Abdomen Midline wound vac (Active)   Wound Image   12/11/20 1130   Site Assessment VON 12/11/20 2045   Periwound Assessment VON 12/11/20 2045   Margins VON 12/11/20 2045   Closure Secondary intention 12/11/20 2045   Drainage Amount VON 12/11/20 2045   Drainage Description Serosanguineous 12/11/20 2045   Treatments Site care 12/11/20 1130   Wound Cleansing Approved Wound Cleanser 12/11/20 1130   Periwound Protectant Stoma Powder;Drape;Skin Protectant Wipes to Periwound 12/11/20 1130   Dressing Cleansing/Solutions Not Applicable 12/11/20 1130   Dressing  Options Wound Vac 12/11/20 2045   Dressing Changed Observed 12/11/20 2045   Dressing Status Clean;Dry;Intact 12/11/20 2045   Dressing Change/Treatment Frequency Monday, Wednesday, Friday, and As Needed 12/11/20 2045   NEXT Dressing Change/Treatment Date 12/14/20 12/11/20 2045   NEXT Weekly Photo (Inpatient Only) 12/18/20 12/11/20 1130   Number of Staples Removed 10 12/09/20 1030   Non-staged Wound Description Full thickness 12/11/20 1130   Wound Length (cm) 14 cm 12/11/20 1130   Wound Width (cm) 5 cm 12/11/20 1130   Wound Depth (cm) 1.5 cm 12/11/20 1130   Wound Surface Area (cm^2) 70 cm^2 12/11/20 1130   Wound Volume (cm^3) 105 cm^3 12/11/20 1130   Wound Healing % 86 12/11/20 1130   Wound Bed Granulation (%) 80 % 11/14/20 1100   Tunneling (cm) 1 cm 12/11/20 1130   Tunneling Clock Position of Wound 12 12/11/20 1130   Undermining (cm) 0 cm 12/11/20 1130   Shape oval 12/11/20 1130   Wound Odor None 12/07/20 1200   Exposed Structures None 12/11/20 1130   WOUND NURSE ONLY - Time Spent with Patient (mins) 60 12/07/20 1200       Peripheral IV 12/10/20 4 Fr Right Forearm (Active)   Site Assessment Clean;Dry;Intact 12/11/20 0800   Dressing Type Transparent 12/11/20 0800   Line Status Blood return noted;Flushed;Saline locked 12/11/20 0800   Dressing Status Clean;Dry;Intact 12/11/20 0800   Dressing Intervention Initial dressing 12/11/20 0800   Dressing Change Due 12/12/20 12/11/20 0800   Date Primary Tubing Changed 12/10/20 12/11/20 0800   Date Secondary Tubing Changed 12/11/20 12/11/20 0800   NEXT Primary Tubing Change  12/12/20 12/11/20 0800   NEXT Secondary Tubing Change  12/12/20 12/11/20 0800   Infiltration Grading ([facility]) 0 12/11/20 0800   Phlebitis Scale ([facility] Only) 0 12/11/20 0800           Urethral Catheter (Active)   Site Assessment Clean;Skin intact 12/11/20 2045   Collection Container Standard drainage bag 12/11/20 2045   Urinary Catheter Care Tamper Evident Seal in Place;Drainage Tube Extended;Drainage  Tubing Properly Secured;Drainage Bag Not Overfilled;Drainage Bag Below Bladder Level and Not on Floor 12/11/20 2045   Securement Method Securing device (Describe) 12/11/20 2045   Output (mL) 600 mL 12/12/20 0500        MENTAL STATUS ON TRANSFER  Level of Consciousness: Alert  Orientation : Oriented x 4  Speech: Speech Clear    CONSULTATIONS  Surgery - [facility] - [facility]  Cardiology - To  Nephrology - [facility]  Psychiatry    PROCEDURES  CT guided Drain Placement 11/4/2020    Segmental colectomy, colostomy creation, mobilization of splenic flexure, wound VAC placement, irrigation and debridement of flank wound 11/10/2020     CT guided drain placement 11/25/2020    LABORATORY  Lab Results   Component Value Date    SODIUM 133 (L) 12/17/2020    POTASSIUM 4.5 12/17/2020    CHLORIDE 96 12/17/2020    CO2 24 12/17/2020    GLUCOSE 126 (H) 12/17/2020    BUN 35 (H) 12/17/2020    CREATININE 1.11 12/17/2020        Lab Results   Component Value Date    WBC 9.1 12/15/2020    HEMOGLOBIN 11.5 (L) 12/15/2020    HEMATOCRIT 37.9 (L) 12/15/2020    PLATELETCT 344 12/15/2020        Total time of the discharge process exceeds 45 minutes.

## 2020-12-12 NOTE — CONSULTS
PSYCHIATRIC FOLLOW-UP:(established)  *Reason for admission:  off meds for around 3 days, pt has hx of a-fib, felt palpitations as well as chest pain and SOB. pt states he also noticed increased swelling in legs        *Legal Hold Status: not applicable                *HPI: he feels hopeful because he will be going to a care facility and believes he has placement afterwards. He smiles easily, is quite talkative telling about what he has been through but very clear he is not SI and no longer depressed. He is sleeping well.           Medical ROS (as pertinent):                    Results for EMELY FLORES (MRN [id number]) as of 12/11/2020 17:09   Ref. Range 12/11/2020 08:48   NT-proBNP Latest Ref Range: 0 - 125 pg/mL 2857 (H)   Results for EMELY FLORES (MRN [id number]) as of 12/11/2020 17:09   Ref. Range 11/9/2020 18:55 11/10/2020 17:50 11/20/2020 17:36 11/29/2020 01:25 12/9/2020 06:25   TSH Latest Ref Range: 0.380 - 5.330 uIU/mL   16.000 (H)  15.000 (H)   Free T-4 Latest Ref Range: 0.93 - 1.70 ng/dL   1.14     EKG: personally reviewed QTc 517    *Psychiatric Examination:   Vitals:   Vitals:    12/11/20 0800 12/11/20 1125 12/11/20 1532 12/11/20 1536   BP: 122/82 113/75 (!) 94/57 125/68   Pulse: 99 (!) 103 (!) 113    Resp: 18 19 20    Temp: 36.4 °C (97.6 °F) 36.3 °C (97.3 °F) 36.5 °C (97.7 °F)    TempSrc: Temporal Temporal Temporal    SpO2: 94% 95% 92%    Weight:       Height:         General Appearance:  good eye contact  Abnormal Movements: none   Gait and Posture: not observed  Speech: verbose  Thought Process: normal rate  Associations:   circumstantial  Abnormal or Psychotic Thoughts: none  Judgement and Insight: improved  Orientation: grossly intact  Recent and Remote Memory: grossly intact  Attention Span and Concentration: intact  Language:spontaneous and fluent  Fund of Knowledge: not tested  Mood and Affect: euthymic  SI/HI:  suicidal - no and homicidal - no  MMSE score and/or clock drawing:not applicable       *ASSESSMENT/RECOMENDATIONS:  1. Depressive disorder unspc with anxiety: improved  -hx of major depression but currently his symptoms have to do with his medical issues.  -  risperdol 0.5/ 1 mg hs.    - modafinil for energy and mood, 100 mg daily. Doing well. No increase. No effect on QTc.  -trazodone for sleep 50 mg            2. Hx of functional neurological symptoms  -        3. Medical  -morbid obesity  -ostomy: LUQ abscess and spontaneous perforation at splenic flexure, Segmental colectomy, colostomy, Patti pouch     -afib  -prolonged QTc again 517  -L shift  -hypocalcemia  -hypothyroidism   -HTN     Legal hold: not applicable  Observation status: routine  Privileges (while on legal hold): no restrictions    Signing off. Please reconsult if needed. However psychotherapy with Dr Fernandez will continue

## 2020-12-13 LAB
ANION GAP SERPL CALC-SCNC: 10 MMOL/L (ref 7–16)
BASOPHILS # BLD AUTO: 1 % (ref 0–1.8)
BASOPHILS # BLD: 0.09 K/UL (ref 0–0.12)
BUN SERPL-MCNC: 21 MG/DL (ref 8–22)
CALCIUM SERPL-MCNC: 9.8 MG/DL (ref 8.5–10.5)
CHLORIDE SERPL-SCNC: 95 MMOL/L (ref 96–112)
CO2 SERPL-SCNC: 27 MMOL/L (ref 20–33)
CREAT SERPL-MCNC: 0.91 MG/DL (ref 0.5–1.4)
EOSINOPHIL # BLD AUTO: 0.53 K/UL (ref 0–0.51)
EOSINOPHIL NFR BLD: 5.8 % (ref 0–6.9)
ERYTHROCYTE [DISTWIDTH] IN BLOOD BY AUTOMATED COUNT: 59.5 FL (ref 35.9–50)
GLUCOSE BLD-MCNC: 122 MG/DL (ref 65–99)
GLUCOSE BLD-MCNC: 125 MG/DL (ref 65–99)
GLUCOSE BLD-MCNC: 139 MG/DL (ref 65–99)
GLUCOSE BLD-MCNC: 144 MG/DL (ref 65–99)
GLUCOSE SERPL-MCNC: 121 MG/DL (ref 65–99)
HCT VFR BLD AUTO: 36.9 % (ref 42–52)
HGB BLD-MCNC: 11.1 G/DL (ref 14–18)
IMM GRANULOCYTES # BLD AUTO: 0.06 K/UL (ref 0–0.11)
IMM GRANULOCYTES NFR BLD AUTO: 0.7 % (ref 0–0.9)
LYMPHOCYTES # BLD AUTO: 2.63 K/UL (ref 1–4.8)
LYMPHOCYTES NFR BLD: 28.7 % (ref 22–41)
MAGNESIUM SERPL-MCNC: 1.9 MG/DL (ref 1.5–2.5)
MCH RBC QN AUTO: 25.9 PG (ref 27–33)
MCHC RBC AUTO-ENTMCNC: 30.1 G/DL (ref 33.7–35.3)
MCV RBC AUTO: 86 FL (ref 81.4–97.8)
MONOCYTES # BLD AUTO: 0.87 K/UL (ref 0–0.85)
MONOCYTES NFR BLD AUTO: 9.5 % (ref 0–13.4)
NEUTROPHILS # BLD AUTO: 4.97 K/UL (ref 1.82–7.42)
NEUTROPHILS NFR BLD: 54.3 % (ref 44–72)
NRBC # BLD AUTO: 0 K/UL
NRBC BLD-RTO: 0 /100 WBC
NT-PROBNP SERPL IA-MCNC: 3129 PG/ML (ref 0–125)
PHOSPHATE SERPL-MCNC: 4.6 MG/DL (ref 2.5–4.5)
PLATELET # BLD AUTO: 441 K/UL (ref 164–446)
PMV BLD AUTO: 10 FL (ref 9–12.9)
POTASSIUM SERPL-SCNC: 4.1 MMOL/L (ref 3.6–5.5)
RBC # BLD AUTO: 4.29 M/UL (ref 4.7–6.1)
SODIUM SERPL-SCNC: 132 MMOL/L (ref 135–145)
WBC # BLD AUTO: 9.2 K/UL (ref 4.8–10.8)

## 2020-12-13 PROCEDURE — 700102 HCHG RX REV CODE 250 W/ 637 OVERRIDE(OP): Performed by: FAMILY MEDICINE

## 2020-12-13 PROCEDURE — 700102 HCHG RX REV CODE 250 W/ 637 OVERRIDE(OP): Performed by: STUDENT IN AN ORGANIZED HEALTH CARE EDUCATION/TRAINING PROGRAM

## 2020-12-13 PROCEDURE — 83735 ASSAY OF MAGNESIUM: CPT

## 2020-12-13 PROCEDURE — 36415 COLL VENOUS BLD VENIPUNCTURE: CPT

## 2020-12-13 PROCEDURE — 80048 BASIC METABOLIC PNL TOTAL CA: CPT

## 2020-12-13 PROCEDURE — A9270 NON-COVERED ITEM OR SERVICE: HCPCS | Performed by: STUDENT IN AN ORGANIZED HEALTH CARE EDUCATION/TRAINING PROGRAM

## 2020-12-13 PROCEDURE — 99232 SBSQ HOSP IP/OBS MODERATE 35: CPT | Performed by: FAMILY MEDICINE

## 2020-12-13 PROCEDURE — 770020 HCHG ROOM/CARE - TELE (206)

## 2020-12-13 PROCEDURE — 82962 GLUCOSE BLOOD TEST: CPT | Mod: 91

## 2020-12-13 PROCEDURE — 84100 ASSAY OF PHOSPHORUS: CPT

## 2020-12-13 PROCEDURE — 83880 ASSAY OF NATRIURETIC PEPTIDE: CPT

## 2020-12-13 PROCEDURE — A9270 NON-COVERED ITEM OR SERVICE: HCPCS | Performed by: FAMILY MEDICINE

## 2020-12-13 PROCEDURE — 90834 PSYTX W PT 45 MINUTES: CPT | Performed by: PSYCHOLOGIST

## 2020-12-13 PROCEDURE — 700111 HCHG RX REV CODE 636 W/ 250 OVERRIDE (IP): Performed by: FAMILY MEDICINE

## 2020-12-13 PROCEDURE — 85025 COMPLETE CBC W/AUTO DIFF WBC: CPT

## 2020-12-13 RX ORDER — DEXTROSE MONOHYDRATE 25 G/50ML
50 INJECTION, SOLUTION INTRAVENOUS
Status: DISCONTINUED | OUTPATIENT
Start: 2020-12-13 | End: 2020-12-15

## 2020-12-13 RX ORDER — MAGNESIUM SULFATE HEPTAHYDRATE 40 MG/ML
2 INJECTION, SOLUTION INTRAVENOUS ONCE
Status: COMPLETED | OUTPATIENT
Start: 2020-12-13 | End: 2020-12-13

## 2020-12-13 RX ADMIN — CETIRIZINE HYDROCHLORIDE 10 MG: 10 TABLET, FILM COATED ORAL at 05:24

## 2020-12-13 RX ADMIN — LEVOTHYROXINE SODIUM 112 MCG: 112 TABLET ORAL at 05:24

## 2020-12-13 RX ADMIN — HYDROCODONE BITARTRATE AND ACETAMINOPHEN 1 TABLET: 5; 325 TABLET ORAL at 22:25

## 2020-12-13 RX ADMIN — MAGNESIUM SULFATE 2 G: 2 INJECTION INTRAVENOUS at 13:09

## 2020-12-13 RX ADMIN — METOPROLOL SUCCINATE 50 MG: 50 TABLET, EXTENDED RELEASE ORAL at 17:27

## 2020-12-13 RX ADMIN — MODAFINIL 100 MG: 100 TABLET ORAL at 05:24

## 2020-12-13 RX ADMIN — FUROSEMIDE 20 MG: 20 TABLET ORAL at 16:20

## 2020-12-13 RX ADMIN — INSULIN GLARGINE 15 UNITS: 100 INJECTION, SOLUTION SUBCUTANEOUS at 06:15

## 2020-12-13 RX ADMIN — FAMOTIDINE 20 MG: 20 TABLET, FILM COATED ORAL at 05:24

## 2020-12-13 RX ADMIN — RISPERIDONE 1 MG: 1 TABLET ORAL at 17:22

## 2020-12-13 RX ADMIN — RISPERIDONE 0.5 MG: 1 TABLET ORAL at 05:23

## 2020-12-13 RX ADMIN — AMIODARONE HYDROCHLORIDE 200 MG: 200 TABLET ORAL at 05:26

## 2020-12-13 RX ADMIN — FUROSEMIDE 20 MG: 20 TABLET ORAL at 05:31

## 2020-12-13 ASSESSMENT — ENCOUNTER SYMPTOMS
BLURRED VISION: 0
BACK PAIN: 0
SPEECH CHANGE: 0
DIAPHORESIS: 0
WHEEZING: 0
NAUSEA: 0
FLANK PAIN: 0
ABDOMINAL PAIN: 0
HEADACHES: 0
SORE THROAT: 0
COUGH: 0
CHILLS: 0
FEVER: 0
MYALGIAS: 0
NERVOUS/ANXIOUS: 0
DIARRHEA: 0
VOMITING: 0
PALPITATIONS: 0
FOCAL WEAKNESS: 0
SHORTNESS OF BREATH: 0
WEAKNESS: 1
HEARTBURN: 0
DIZZINESS: 0
SENSORY CHANGE: 0
NECK PAIN: 0

## 2020-12-13 ASSESSMENT — PAIN DESCRIPTION - PAIN TYPE: TYPE: ACUTE PAIN

## 2020-12-13 NOTE — PROGRESS NOTES
Hospital Medicine Daily Progress Note    Date of Service  12/13/2020    Chief Complaint  55 y.o. male admitted 11/3/2020 with perforated colon.    Hospital Course  Admitted as a transfer, was noted to have splenic fluid collection.  Surgery was consulted on the case, CT-guided drain was done.  Further work-up revealed that he had a perforated colon.  Patient underwent segmental colectomy, colostomy creation, mobilization of splenic flexure, wound VAC placement, irrigation debridement of flank wound on 11/10/2020.  His culture showed Enterococcus and Candida.  His blood culture in 1 bottle showed Streptococcus viridans. Infectious disease was consulted on the case.  He was placed on antibiotics the form of IV Vancomycin, Rocephin, Diflucan. On  admission he also had acute kidney injury and hyperkalemia, Nephrology was consulted on the case and these have resolved. He has known history of congestive heart failure and cardiomyopathy, chronic atrial fibrillation, had episodes of rapid ventricular rate and Cardiology was consulted on the case.  He also had an episode of suicidal ideation and he was placed on legal hold.  Psychiatry was consulted on the case, he has been taken off legal hold at this point.  Physical therapy and Occupational Therapy came to evaluate him and they recommend continued rehabitation skilled nursing facility. He vontinues to require wound care with the wound VAC still in place.  Also has a drain still in place at the left upper quadrant area, he will need to follow-up with surgery for this.    Interval Problem Update  Perforated colon - pain controlled, tolerating regular diet  Abdominal abscess - culture showed Enterococcus and Candida  Bacteremia - Streptococcus viridans, Echocardiogram and repeat cultures negative  Diabetes - 120s  HTN - sbp 100-140  CHF/CM - probnp 3100  Afib - rate   Low magnesium    Consultants/Specialty  Surgery  ID  Nephrology  Cardiology  Psychiatry    Code  Status  Full Code    Disposition  Legal hold discontinued  Medically cleared   SNF - awaiting bed availability    Review of Systems  Review of Systems   Constitutional: Positive for malaise/fatigue. Negative for chills, diaphoresis and fever.   HENT: Negative for congestion, hearing loss and sore throat.    Eyes: Negative for blurred vision.   Respiratory: Negative for cough, shortness of breath and wheezing.    Cardiovascular: Negative for chest pain, palpitations and leg swelling.   Gastrointestinal: Negative for abdominal pain, diarrhea, heartburn, nausea and vomiting.   Genitourinary: Negative for dysuria, flank pain and hematuria.   Musculoskeletal: Negative for back pain, joint pain, myalgias and neck pain.   Skin: Negative for rash.   Neurological: Positive for weakness. Negative for dizziness, sensory change, speech change, focal weakness and headaches.   Psychiatric/Behavioral: Negative for suicidal ideas. The patient is not nervous/anxious.         Physical Exam  Temp:  [36.4 °C (97.5 °F)-36.8 °C (98.2 °F)] 36.7 °C (98 °F)  Pulse:  [] 118  Resp:  [16-18] 18  BP: (100-140)/(67-90) 123/86  SpO2:  [94 %-99 %] 94 %    Physical Exam  Vitals signs and nursing note reviewed.   Constitutional:       Appearance: He is obese.   HENT:      Head: Normocephalic and atraumatic.      Nose: No congestion.      Mouth/Throat:      Mouth: Mucous membranes are moist.   Eyes:      Extraocular Movements: Extraocular movements intact.      Conjunctiva/sclera: Conjunctivae normal.      Pupils: Pupils are equal, round, and reactive to light.   Neck:      Musculoskeletal: No muscular tenderness.   Cardiovascular:      Rate and Rhythm: Normal rate. Rhythm irregularly irregular.   Pulmonary:      Effort: Pulmonary effort is normal.      Breath sounds: Normal breath sounds.   Abdominal:      General: There is no distension.      Tenderness: There is no abdominal tenderness. There is no guarding or rebound.      Comments: Wound  vac at midline  Ostomy  Drain at LUQ   Musculoskeletal:      Right lower leg: Edema present.      Left lower leg: Edema present.   Lymphadenopathy:      Cervical: No cervical adenopathy.   Skin:     General: Skin is warm and dry.   Neurological:      General: No focal deficit present.      Mental Status: He is alert and oriented to person, place, and time.      Cranial Nerves: No cranial nerve deficit.   Psychiatric:         Thought Content: Thought content does not include suicidal ideation. Thought content does not include suicidal plan.         Fluids    Intake/Output Summary (Last 24 hours) at 12/13/2020 1127  Last data filed at 12/13/2020 0926  Gross per 24 hour   Intake 240 ml   Output 2900 ml   Net -2660 ml       Laboratory  Recent Labs     12/11/20  0848 12/12/20  0348 12/13/20  0632   WBC 9.8 9.0 9.2   RBC 4.03* 3.97* 4.29*   HEMOGLOBIN 10.6* 10.4* 11.1*   HEMATOCRIT 35.2* 34.2* 36.9*   MCV 87.3 86.1 86.0   MCH 26.3* 26.2* 25.9*   MCHC 30.1* 30.4* 30.1*   RDW 61.7* 59.6* 59.5*   PLATELETCT 508* 470* 441   MPV 9.5 10.0 10.0     Recent Labs     12/11/20  0848 12/12/20  0348 12/13/20  0632   SODIUM 134* 131* 132*   POTASSIUM 4.1 4.2 4.1   CHLORIDE 95* 95* 95*   CO2 29 26 27   GLUCOSE 117* 112* 121*   BUN 20 20 21   CREATININE 1.00 0.90 0.91   CALCIUM 9.2 9.2 9.8                   Imaging  IR-US GUIDED PIV   Final Result    Ultrasound-guided PERIPHERAL IV INSERTION performed by    qualified nursing staff as above.      CT-ABDOMEN-PELVIS WITH   Final Result      1.  Interval placement of a surgical drain into a splenic fluid collection. The fluid collection has decreased in size with a small amount of residual fluid seen surrounding the percutaneous catheter drain tip.      2.  No evidence of bowel obstruction.      3.  Left lower quadrant ostomy site.      4.  Bibasilar atelectasis with small bilateral pleural effusions.      5.  Gallstones.      6.  Fatty liver.      7.  Anasarca.      8.  Small amount of  ascites.      IR-MIDLINE CATHETER INSERTION WO GUIDANCE > AGE 3   Final Result                  Ultrasound-guided midline placement performed by qualified nursing staff    as above.          CT-DRAIN-PERITONEAL   Final Result      Successful splenic abscess drainage tube placement.      Plan: Twice daily flushes with 10 mL of sterile saline. Monitor outputs.         CT-ABDOMEN-PELVIS WITH   Final Result      1.  Intraparenchymal collection in the anterior aspect of the spleen has increased in size from the prior exam.      2.  Small amount of free fluid in the pelvis has increased from the prior exam.      3.  Small bilateral pleural effusions layer posteriorly, left greater than right, slightly decreased from the prior exam. Adjacent airspace disease is likely atelectasis.      4.  Cholelithiasis.      5.  Right renal scarring.      [id number] VIEW   Final Result      1.  No evidence of bowel obstruction.   2.  Postoperative changes as described.      CT-ABDOMEN-PELVIS W/O   Final Result      Postsurgical changes with a left abdominal stoma. Parastomal densities may be related to postsurgical hematoma measuring up to 2.8 x 2.1 cm.      Small amount of free fluid in the abdomen and pelvis.      Collection along the anterior spleen measures approximately 2.9 x 4.3 cm is decreased in size compared to the prior examination. Evaluation is limited by lack of intravenous contrast.      Third spacing.      Trace free intraperitoneal air is likely related to recent surgery.      Moderate left and small right pleural effusions with overlying atelectasis/consolidation.      Cortical scarring of the right kidney.      Cholelithiasis.         DX-CHEST-PORTABLE (1 VIEW)   Final Result      1.  Bilateral airspace and interstitial infiltrates.      2.  Cardiomegaly.      US-RENAL   Final Result    Examination was limited due to poor sound penetration related to body habitus.   Grossly normal appearance of the kidneys.       EC-ECHOCARDIOGRAM COMPLETE W/ CONT   Final Result      DX-CHEST-PORTABLE (1 VIEW)   Final Result      No pneumothorax identified following right subclavian line placement      Worsening atelectasis with consolidation at the bases not excluded      Stable cardiac silhouette enlargement      CT-ABDOMEN-PELVIS WITH   Final Result      Interval decrease in size of perisplenic abscess. Pigtail catheter now in place      Abscess has likely fistulous communication with the abutting splenic flexure of the colon. Colon is otherwise normal in appearance      Mild hepatosplenomegaly      Decreasing small left pleural effusion      Subacute anterior wedge compression fractures in the caudal thoracic spine      CT-DRAIN-PERITONEAL   Final Result      1.  CT guided perisplenic abscess catheter drainage.   2.  The current plan is to obtain a follow-up CT in 5-7 days..      OUTSIDE IMAGES-CT CHEST   Final Result      OUTSIDE IMAGES-CT ABDOMEN /PELVIS   Final Result      CT-ABDOMEN-PELVIS WITH   Final Result   Addendum 1 of 1   Addendum:   A prior noncontrast CT of the abdomen and pelvis from [facility], [place] dated 10/19/2020 was made available for comparison. On the    prior study a perisplenic/subcapsular fluid collection was seen however    did not contain air at that time and    there was no discrete evidence of communication with the transverse colon.    Communication with splenic flexure of the colon and air within the    collection are new from the prior study.      Final      DX-CHEST-PORTABLE (1 VIEW)   Final Result      Slight interval interval improvement in congestive heart failure.           Assessment/Plan  * Spontaneous perforation of colon (HCC)- (present on admission)  Assessment & Plan  Segmental colectomy, colostomy creation, mobilization of splenic flexure, wound VAC placement, irrigation and debridement of flank wound 11/10/2020  Finished course of IV Vancomycin, Rocephin, Diflucan   Wound  care  Pain control    Gram-positive bacteremia- (present on admission)  Assessment & Plan  Finished course of IV Vancomycin and Rocephin    Intra-abdominal abscess (HCC)- (present on admission)  Assessment & Plan  CT-guided drain 11/25/2020  CT guided Drain Placement 11/3/2020  Finished course of IV Vancomycin, Rocephin, Diflucan     SRUTHI (acute kidney injury) ([facility])  Assessment & Plan  Follow BMP    Sepsis ([facility])  Assessment & Plan  Secondary to perforation of colon, splenic abscess    Anxiety and depression- (present on admission)  Assessment & Plan  Risperdal, Trazodone    Hypothyroidism- (present on admission)  Assessment & Plan  Levothyroxine 112 mcg   Check TSH on 12/21/2020    Prolonged QT interval- (present on admission)  Assessment & Plan  Cardiac monitor  follow EKG    NICM (nonischemic cardiomyopathy) ([facility])- (present on admission)  Assessment & Plan  Toprol, Lisinopril, Lasix, Aldactone    Suicidal ideation  Assessment & Plan  Off Legal hold    Chronic systolic heart failure ([facility])- (present on admission)  Assessment & Plan  Toprol, Lisinopril, Aldactone, Lasix     Chronic atrial fibrillation- (present on admission)  Assessment & Plan  Amiodarone, Toprol  Was not on anticoagulation    Essential hypertension- (present on admission)  Assessment & Plan  Aldactone, Toprol-XL, Lisinopril    Type 2 diabetes mellitus with hyperglycemia, without long-term current use of insulin ([facility])- (present on admission)  Assessment & Plan  Lantus, SSI      Hypomagnesemia- (present on admission)  Assessment & Plan  IV Mg 2 g  Follow level    Hypokalemia- (present on admission)  Assessment & Plan  Follow bmp    Elevated digoxin level  Assessment & Plan  Hold digoxin       VTE prophylaxis: SCD

## 2020-12-13 NOTE — PROGRESS NOTES
Assumed care of patient at 0645. Assessment complete.  AA&Ox4. Denies CP/SOB.  Denies pain at this time. Pain managed per MAR.  LLQ ostomy with adequate output, appliance checked and burped. Zabala catheter in place to gravity, adequate output. Zabala care provided.  LLQ IR drain to bulb suction, flushed per order.   Wound vac in place, appliance checked, patent.  Blanchable redness to sacrum  Preventative mepilex in place to bilateral heels.   Tolerating diet. Denies N/V.  Patient up max assist, Q2 turns, patient repositions self frequently.  All needs met at this time. Call light within reach. Pt calls appropriately. Will continue to monitor.

## 2020-12-13 NOTE — CARE PLAN
Problem: Mobility  Goal: Risk for activity intolerance will decrease  Outcome: PROGRESSING AS EXPECTED   Up to chair x2 today, use FWW, turn in bed every 2 hours, complete exercises with band given by PT.    Problem: Skin Integrity  Goal: Risk for impaired skin integrity will decrease  Outcome: PROGRESSING AS EXPECTED   Turn in bed every 2 hours, interdry to panus/folds, cleanse skin, barrier paste to sacrum, low air loss bed, bilateral heel mepilex, frye/ostomy in place.

## 2020-12-14 LAB
ANION GAP SERPL CALC-SCNC: 11 MMOL/L (ref 7–16)
ANISOCYTOSIS BLD QL SMEAR: ABNORMAL
BASOPHILS # BLD AUTO: 1.3 % (ref 0–1.8)
BASOPHILS # BLD: 0.13 K/UL (ref 0–0.12)
BUN SERPL-MCNC: 21 MG/DL (ref 8–22)
CALCIUM SERPL-MCNC: 9.8 MG/DL (ref 8.5–10.5)
CHLORIDE SERPL-SCNC: 93 MMOL/L (ref 96–112)
CO2 SERPL-SCNC: 27 MMOL/L (ref 20–33)
COMMENT 1642: NORMAL
CREAT SERPL-MCNC: 0.95 MG/DL (ref 0.5–1.4)
EOSINOPHIL # BLD AUTO: 0.52 K/UL (ref 0–0.51)
EOSINOPHIL NFR BLD: 5.1 % (ref 0–6.9)
ERYTHROCYTE [DISTWIDTH] IN BLOOD BY AUTOMATED COUNT: 59.7 FL (ref 35.9–50)
GLUCOSE BLD-MCNC: 129 MG/DL (ref 65–99)
GLUCOSE BLD-MCNC: 156 MG/DL (ref 65–99)
GLUCOSE BLD-MCNC: 176 MG/DL (ref 65–99)
GLUCOSE BLD-MCNC: 176 MG/DL (ref 65–99)
GLUCOSE SERPL-MCNC: 141 MG/DL (ref 65–99)
HCT VFR BLD AUTO: 39.9 % (ref 42–52)
HGB BLD-MCNC: 11.9 G/DL (ref 14–18)
IMM GRANULOCYTES # BLD AUTO: 0.05 K/UL (ref 0–0.11)
IMM GRANULOCYTES NFR BLD AUTO: 0.5 % (ref 0–0.9)
LYMPHOCYTES # BLD AUTO: 3 K/UL (ref 1–4.8)
LYMPHOCYTES NFR BLD: 29.3 % (ref 22–41)
MAGNESIUM SERPL-MCNC: 1.8 MG/DL (ref 1.5–2.5)
MCH RBC QN AUTO: 25.9 PG (ref 27–33)
MCHC RBC AUTO-ENTMCNC: 29.8 G/DL (ref 33.7–35.3)
MCV RBC AUTO: 86.7 FL (ref 81.4–97.8)
MICROCYTES BLD QL SMEAR: ABNORMAL
MONOCYTES # BLD AUTO: 0.95 K/UL (ref 0–0.85)
MONOCYTES NFR BLD AUTO: 9.3 % (ref 0–13.4)
MORPHOLOGY BLD-IMP: NORMAL
NEUTROPHILS # BLD AUTO: 5.6 K/UL (ref 1.82–7.42)
NEUTROPHILS NFR BLD: 54.5 % (ref 44–72)
NRBC # BLD AUTO: 0 K/UL
NRBC BLD-RTO: 0 /100 WBC
NT-PROBNP SERPL IA-MCNC: 3220 PG/ML (ref 0–125)
PHOSPHATE SERPL-MCNC: 4.3 MG/DL (ref 2.5–4.5)
PLATELET # BLD AUTO: 384 K/UL (ref 164–446)
PLATELET BLD QL SMEAR: NORMAL
PMV BLD AUTO: 9.6 FL (ref 9–12.9)
POTASSIUM SERPL-SCNC: 4.1 MMOL/L (ref 3.6–5.5)
RBC # BLD AUTO: 4.6 M/UL (ref 4.7–6.1)
RBC BLD AUTO: PRESENT
SODIUM SERPL-SCNC: 131 MMOL/L (ref 135–145)
WBC # BLD AUTO: 10.3 K/UL (ref 4.8–10.8)

## 2020-12-14 PROCEDURE — A9270 NON-COVERED ITEM OR SERVICE: HCPCS | Performed by: STUDENT IN AN ORGANIZED HEALTH CARE EDUCATION/TRAINING PROGRAM

## 2020-12-14 PROCEDURE — 302098 PASTE RING (FLAT): Performed by: FAMILY MEDICINE

## 2020-12-14 PROCEDURE — 90832 PSYTX W PT 30 MINUTES: CPT | Performed by: PSYCHOLOGIST

## 2020-12-14 PROCEDURE — 36415 COLL VENOUS BLD VENIPUNCTURE: CPT

## 2020-12-14 PROCEDURE — 83735 ASSAY OF MAGNESIUM: CPT

## 2020-12-14 PROCEDURE — A9270 NON-COVERED ITEM OR SERVICE: HCPCS | Performed by: FAMILY MEDICINE

## 2020-12-14 PROCEDURE — 82962 GLUCOSE BLOOD TEST: CPT | Mod: 91

## 2020-12-14 PROCEDURE — 700111 HCHG RX REV CODE 636 W/ 250 OVERRIDE (IP): Performed by: FAMILY MEDICINE

## 2020-12-14 PROCEDURE — 85025 COMPLETE CBC W/AUTO DIFF WBC: CPT

## 2020-12-14 PROCEDURE — 83880 ASSAY OF NATRIURETIC PEPTIDE: CPT

## 2020-12-14 PROCEDURE — 700102 HCHG RX REV CODE 250 W/ 637 OVERRIDE(OP): Performed by: STUDENT IN AN ORGANIZED HEALTH CARE EDUCATION/TRAINING PROGRAM

## 2020-12-14 PROCEDURE — 97608 NEG PRS WND THER NDME>50SQCM: CPT

## 2020-12-14 PROCEDURE — 770020 HCHG ROOM/CARE - TELE (206)

## 2020-12-14 PROCEDURE — 302111 WAFER OST 2.25IN N IMG RD 2 PC (BARRIER): Performed by: FAMILY MEDICINE

## 2020-12-14 PROCEDURE — 80048 BASIC METABOLIC PNL TOTAL CA: CPT

## 2020-12-14 PROCEDURE — 99232 SBSQ HOSP IP/OBS MODERATE 35: CPT | Performed by: FAMILY MEDICINE

## 2020-12-14 PROCEDURE — 84100 ASSAY OF PHOSPHORUS: CPT

## 2020-12-14 PROCEDURE — 700102 HCHG RX REV CODE 250 W/ 637 OVERRIDE(OP): Performed by: FAMILY MEDICINE

## 2020-12-14 PROCEDURE — 302102 BAG OST N IMG 2.25IN 2PC (FECAL): Performed by: FAMILY MEDICINE

## 2020-12-14 RX ORDER — TAMSULOSIN HYDROCHLORIDE 0.4 MG/1
0.4 CAPSULE ORAL
Status: DISCONTINUED | OUTPATIENT
Start: 2020-12-14 | End: 2020-12-17 | Stop reason: HOSPADM

## 2020-12-14 RX ORDER — LISINOPRIL 5 MG/1
2.5 TABLET ORAL
Status: DISCONTINUED | OUTPATIENT
Start: 2020-12-15 | End: 2020-12-17 | Stop reason: HOSPADM

## 2020-12-14 RX ORDER — MAGNESIUM SULFATE HEPTAHYDRATE 40 MG/ML
2 INJECTION, SOLUTION INTRAVENOUS ONCE
Status: COMPLETED | OUTPATIENT
Start: 2020-12-14 | End: 2020-12-14

## 2020-12-14 RX ORDER — SPIRONOLACTONE 25 MG/1
12.5 TABLET ORAL
Status: DISCONTINUED | OUTPATIENT
Start: 2020-12-15 | End: 2020-12-17 | Stop reason: HOSPADM

## 2020-12-14 RX ORDER — ALPRAZOLAM 0.25 MG/1
0.25 TABLET ORAL ONCE
Status: COMPLETED | OUTPATIENT
Start: 2020-12-14 | End: 2020-12-14

## 2020-12-14 RX ADMIN — LEVOTHYROXINE SODIUM 112 MCG: 112 TABLET ORAL at 04:26

## 2020-12-14 RX ADMIN — MAGNESIUM SULFATE 2 G: 2 INJECTION INTRAVENOUS at 12:49

## 2020-12-14 RX ADMIN — HYDROCODONE BITARTRATE AND ACETAMINOPHEN 1 TABLET: 5; 325 TABLET ORAL at 19:26

## 2020-12-14 RX ADMIN — METOPROLOL SUCCINATE 50 MG: 50 TABLET, EXTENDED RELEASE ORAL at 17:00

## 2020-12-14 RX ADMIN — FUROSEMIDE 20 MG: 20 TABLET ORAL at 08:54

## 2020-12-14 RX ADMIN — MODAFINIL 100 MG: 100 TABLET ORAL at 04:27

## 2020-12-14 RX ADMIN — TAMSULOSIN HYDROCHLORIDE 0.4 MG: 0.4 CAPSULE ORAL at 10:13

## 2020-12-14 RX ADMIN — FAMOTIDINE 20 MG: 20 TABLET, FILM COATED ORAL at 04:26

## 2020-12-14 RX ADMIN — RISPERIDONE 0.5 MG: 1 TABLET ORAL at 04:26

## 2020-12-14 RX ADMIN — SPIRONOLACTONE 25 MG: 50 TABLET ORAL at 08:55

## 2020-12-14 RX ADMIN — HYDROCODONE BITARTRATE AND ACETAMINOPHEN 1 TABLET: 5; 325 TABLET ORAL at 09:17

## 2020-12-14 RX ADMIN — CETIRIZINE HYDROCHLORIDE 10 MG: 10 TABLET, FILM COATED ORAL at 04:27

## 2020-12-14 RX ADMIN — RISPERIDONE 1 MG: 1 TABLET ORAL at 17:00

## 2020-12-14 RX ADMIN — AMIODARONE HYDROCHLORIDE 200 MG: 200 TABLET ORAL at 04:26

## 2020-12-14 RX ADMIN — FUROSEMIDE 20 MG: 20 TABLET ORAL at 15:41

## 2020-12-14 RX ADMIN — ALPRAZOLAM 0.25 MG: 0.25 TABLET ORAL at 10:13

## 2020-12-14 RX ADMIN — INSULIN GLARGINE 15 UNITS: 100 INJECTION, SOLUTION SUBCUTANEOUS at 06:04

## 2020-12-14 ASSESSMENT — ENCOUNTER SYMPTOMS
FEVER: 0
PALPITATIONS: 0
VOMITING: 0
HEARTBURN: 0
NERVOUS/ANXIOUS: 0
SORE THROAT: 0
WHEEZING: 0
SPEECH CHANGE: 0
DIZZINESS: 0
HEADACHES: 0
MYALGIAS: 0
COUGH: 0
SHORTNESS OF BREATH: 0
FOCAL WEAKNESS: 0
NAUSEA: 0
DIAPHORESIS: 0
DIARRHEA: 0
BACK PAIN: 0
BLURRED VISION: 0
SENSORY CHANGE: 0
NECK PAIN: 0
CHILLS: 0
WEAKNESS: 1
ABDOMINAL PAIN: 0
FLANK PAIN: 0

## 2020-12-14 ASSESSMENT — PAIN DESCRIPTION - PAIN TYPE
TYPE: ACUTE PAIN

## 2020-12-14 ASSESSMENT — FIBROSIS 4 INDEX: FIB4 SCORE: 0.78

## 2020-12-14 NOTE — PROGRESS NOTES
Assumed care of patient at 0645. Assessment complete.  AA&Ox4. Denies CP/SOB.  Denies pain. Will continue to monitor.  Generalized edema, scrotal/penile edema noted.   LLQ ostomy with adequate output, appliance checked and burped.   LLQ IR drain to bulb suction, flushed per order.  Wound vac in place, appliance checked, patent.  Tolerating diet. Denies N/V.  Patient continue to void small amounts 100-200 per void. Will continue to monitor.  Pt up max assist, q2 turns, patient repositions self frequently.  All needs met at this time. Call light within reach. Pt calls appropriately. Will continue to monitor.

## 2020-12-14 NOTE — CARE PLAN
Problem: Safety  Goal: Will remain free from injury  Outcome: PROGRESSING AS EXPECTED  Goal: Will remain free from falls  Outcome: PROGRESSING AS EXPECTED     Problem: Infection  Goal: Will remain free from infection  Outcome: PROGRESSING AS EXPECTED     Problem: Venous Thromboembolism (VTW)/Deep Vein Thrombosis (DVT) Prevention:  Goal: Patient will participate in Venous Thrombosis (VTE)/Deep Vein Thrombosis (DVT)Prevention Measures  Outcome: PROGRESSING AS EXPECTED     Problem: Bowel/Gastric:  Goal: Normal bowel function is maintained or improved  Outcome: PROGRESSING AS EXPECTED  Intervention: Educate patient and significant other/support system about diet, fluid intake, medications and activity to promote bowel function  Note: Ostomy in place, +BM

## 2020-12-14 NOTE — PROGRESS NOTES
Assumed care of patient at 0645. Assessment complete.  AA&Ox4. Denies CP/SOB.  Denies pain at this time. Pain managed per MAR.  LLQ ostomy with adequate output, appliance replaced due to leak.   Zabala replaced per orders for urinary retention.  LLQ IR drain to bulb suction, flushed per order.   Wound vac in place, appliance checked, patent.  Blanchable redness to sacrum  Preventative mepilex in place to bilateral heels.   IV infiltrated, removed and relocated. US guided placement.   Tolerating diet. Denies N/V.  Patient up max assist, Q2 turns, patient repositions self frequently.  All needs met at this time. Call light within reach. Pt calls appropriately. Will continue to monitor.

## 2020-12-14 NOTE — PROGRESS NOTES
Patient rested well throughout shift  AAOx4  RA  Colostomy to RLQ  IR drain to LLQ, 10ml flush q shift    Straight cath x2. Patient unable to pee, complaining of bladder pain.    Most recent straight cath at 0430    MLI incision with wound vac

## 2020-12-14 NOTE — CONSULTS
"PSYCHOLOGICAL FOLLOW-UP:  Reason for admission: Perforated sigmoid colon (HCC) [K63.1]  Length of Visit: 45min    Legal status: Legal Status: Voluntary    Chief Complaint: \"I just need to wait.\"    HPI: Met with the patient for brief individual psychotherapy. Patient presented with a euthymic affect and reported a \"ok\" mood. Session focused on his continued efforts to strengthen his ability to cope with painful emotions and practice patience with his prolonged hospitalization and uncertain discharge date. Explored how the patient successfully coped with feeling fearful and hopeless the previous night by reaching out to nursing for extra emotional support, reminding himself that his hospitalization is temporary, and reminding himself why he wants to physically recover. This includes his desire to live and his belief that he still has more to do in his life. Also discussed anxious anticipation and how to foster patience.     Psychiatric Examination:  Vitals: Blood pressure 117/83, pulse (!) 107, temperature 37.1 °C (98.7 °F), temperature source Temporal, resp. rate 18, height 1.829 m (6'), weight (!) 131.3 kg (289 lb 7.4 oz), SpO2 95 %.  Musculoskeletal: normal psychomotor activity, no tics or unusual mannerisms noted  Appearance and Eye Contact: appropriate dress and grooming. Behavior is calm, cooperative,  appropriate eye contact  Attention/Alertness: Alert  Thought Process: Linear, Logical and Goal Directed    Thought Content: No psychotic processes noted  Speech: Clear with normal rate and rhythm  Mood: \"ok\"            Affect: euthymic, laughing and smiling appropriately          SI/HI: Denies    Memory: Recent and remote memory appear intact    Orientation: alert, oriented to person, place and time  Insight into symptoms: good  Judgement into symptoms:good    ASSESSMENT: Patient continues to deny suicidality and present as future focused. No legal hold needed.     DSM5 Diagnostic Considerations:   Unspecified " Anxiety Disorder  Unspecified Depressive Disorder      PLAN:  Legal status: Voluntary  Anticipate F/U within 48 hours.   Records reviewed: yes  Will continue to follow  Thank you for the consult.    Madhuri Fernandez, Ph.D.

## 2020-12-14 NOTE — CONSULTS
"PSYCHOLOGICAL FOLLOW-UP:  Reason for admission: Perforated sigmoid colon (HCC) [K63.1]  Length of Visit: 20min    Legal status: Legal Status: Voluntary    Chief Complaint: \"I'm making it through.\"    HPI: Met with the patient for brief individual psychotherapy. Patient presented with a tired affect and reported a \"ok\" mood. He was able to laugh and smile appropriately. Session focused on self-care during difficult days with pain. Patient reported today as a difficult pain day due to multiple health related issues. Discussed the importance of intentionally planning self-care today to assist in recharging emotional strength. 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 This writer added to that metaphor that every boxer still needs to take a break to drink some water and be cared for by their  between rounds. Patient smiled and agreed.     Psychiatric Examination:  Vitals: Blood pressure 109/66, pulse (!) 124, temperature 36.4 °C (97.6 °F), temperature source Temporal, resp. rate 18, height 1.829 m (6'), weight 123 kg (271 lb 2.7 oz), SpO2 96 %.  Musculoskeletal: normal psychomotor activity, no tics or unusual mannerisms noted  Appearance and Eye Contact: appropriate dress and grooming. Behavior is calm, cooperative,  appropriate eye contact  Attention/Alertness: Alert  Thought Process: Linear, Logical and Goal Directed    Thought Content: No psychotic processes noted  Speech: Clear with normal rate and rhythm  Mood: \"ok\"            Affect: tired         SI/HI: Denies    Memory: Recent and remote memory appear intact    Orientation: alert, oriented to person, place and time  Insight into symptoms: good  Judgement into symptoms:good    ASSESSMENT:     DSM5 Diagnostic Considerations:   Unspecified Anxiety Disorder  Unspecified Depressive Disorder    PLAN:  Legal status: Voluntary  Anticipate F/U when back on service  Records reviewed: yes  Discussed patient with other provider: yes, " team psychiatrist  Will continue to follow  Thank you for the consult.    Madhuri Fernandez, Ph.D.

## 2020-12-14 NOTE — PROGRESS NOTES
Assumed care of patient  AAOx4, RA  Telemetry- AFIBB  Denies SOB  Generalized edema, scrotal/penile edema noted.   Zabala removed today, +void in urinal. However at 2200, began complaining of inability to pee and pain related to retention. Bladder scan of >500. Straight cath x1 at 2200.     Skin: Low airloss bed, Pannus red, interdry in place. Generalized flaky/fragile/swelling.   Sacrum red/purple. No open areas. Barrier cream applied. Preventative mepilexes on heels. Low air loss bed, education provided on turns.    Pain managed per MAR with Norco

## 2020-12-14 NOTE — PROGRESS NOTES
[facility] Medicine Daily Progress Note    Date of Service  12/14/2020    Chief Complaint  55 y.o. male admitted 11/3/2020 with perforated colon.    Hospital Course  Admitted as a transfer, was noted to have splenic fluid collection.  Surgery was consulted on the case, CT-guided drain was done.  Further work-up revealed that he had a perforated colon.  Patient underwent segmental colectomy, colostomy creation, mobilization of splenic flexure, wound VAC placement, irrigation debridement of flank wound on 11/10/2020.  His culture showed Enterococcus and Candida.  His blood culture in 1 bottle showed Streptococcus viridans. Infectious disease was consulted on the case.  He was placed on antibiotics the form of IV Vancomycin, Rocephin, Diflucan. On  admission he also had acute kidney injury and hyperkalemia, Nephrology was consulted on the case and these have resolved. He has known history of congestive heart failure and cardiomyopathy, chronic atrial fibrillation, had episodes of rapid ventricular rate and Cardiology was consulted on the case.  He also had an episode of suicidal ideation and he was placed on legal hold.  Psychiatry was consulted on the case, he has been taken off legal hold at this point.  Physical therapy and Occupational Therapy came to evaluate him and they recommend continued rehabitation skilled nursing facility. He vontinues to require wound care with the wound VAC still in place.  Also has a drain still in place at the left upper quadrant area, he will need to follow-up with surgery for this.    Interval Problem Update  Perforated colon - pain controlled, tolerating regular diet  Abdominal abscess - culture showed Enterococcus and Candida  Bacteremia - Streptococcus viridans, Echocardiogram and repeat cultures negative  Diabetes - 120-150  HTN - sbp   CHF/CM - probnp 3200  Afib - rate   Urinary retention - trial of removing Zabala yesterday, however had to be straight cath x 2  Low  magnesium    Consultants/Specialty  Surgery  ID  Nephrology  Cardiology  Psychiatry    Code Status  Full Code    Disposition  Legal hold discontinued  Medically cleared   SNF - awaiting bed availability    Review of Systems  Review of Systems   Constitutional: Positive for malaise/fatigue. Negative for chills, diaphoresis and fever.   HENT: Negative for congestion, hearing loss and sore throat.    Eyes: Negative for blurred vision.   Respiratory: Negative for cough, shortness of breath and wheezing.    Cardiovascular: Negative for chest pain, palpitations and leg swelling.   Gastrointestinal: Negative for abdominal pain, diarrhea, heartburn, nausea and vomiting.   Genitourinary: Negative for dysuria, flank pain and hematuria.   Musculoskeletal: Negative for back pain, joint pain, myalgias and neck pain.   Skin: Negative for rash.   Neurological: Positive for weakness. Negative for dizziness, sensory change, speech change, focal weakness and headaches.   Psychiatric/Behavioral: Negative for suicidal ideas. The patient is not nervous/anxious.         Physical Exam  Temp:  [36.4 °C (97.6 °F)-36.8 °C (98.3 °F)] 36.4 °C (97.6 °F)  Pulse:  [] 124  Resp:  [18] 18  BP: ()/(66-84) 109/66  SpO2:  [94 %-96 %] 96 %    Physical Exam  Vitals signs and nursing note reviewed.   Constitutional:       Appearance: He is obese.   HENT:      Head: Normocephalic and atraumatic.      Nose: No congestion.      Mouth/Throat:      Mouth: Mucous membranes are moist.   Eyes:      Extraocular Movements: Extraocular movements intact.      Conjunctiva/sclera: Conjunctivae normal.      Pupils: Pupils are equal, round, and reactive to light.   Neck:      Musculoskeletal: No muscular tenderness.   Cardiovascular:      Rate and Rhythm: Normal rate. Rhythm irregularly irregular.   Pulmonary:      Effort: Pulmonary effort is normal.      Breath sounds: Normal breath sounds.   Abdominal:      General: There is no distension.      Tenderness:  There is no abdominal tenderness. There is no guarding or rebound.      Comments: Wound vac at midline  Ostomy  Drain at LUQ   Musculoskeletal:      Right lower leg: Edema present.      Left lower leg: Edema present.   Lymphadenopathy:      Cervical: No cervical adenopathy.   Skin:     General: Skin is warm and dry.   Neurological:      General: No focal deficit present.      Mental Status: He is alert and oriented to person, place, and time.      Cranial Nerves: No cranial nerve deficit.   Psychiatric:         Thought Content: Thought content does not include suicidal ideation. Thought content does not include suicidal plan.         Fluids    Intake/Output Summary (Last 24 hours) at 12/14/2020 1148  Last data filed at 12/14/2020 1000  Gross per 24 hour   Intake 290 ml   Output 2225 ml   Net -1935 ml       Laboratory  Recent Labs     12/12/20  0348 12/13/20  0632 12/14/20  0623   WBC 9.0 9.2 10.3   RBC 3.97* 4.29* 4.60*   HEMOGLOBIN 10.4* 11.1* 11.9*   HEMATOCRIT 34.2* 36.9* 39.9*   MCV 86.1 86.0 86.7   MCH 26.2* 25.9* 25.9*   MCHC 30.4* 30.1* 29.8*   RDW 59.6* 59.5* 59.7*   PLATELETCT 470* 441 384   MPV 10.0 10.0 9.6     Recent Labs     12/12/20  0348 12/13/20  0632 12/14/20  0623   SODIUM 131* 132* 131*   POTASSIUM 4.2 4.1 4.1   CHLORIDE 95* 95* 93*   CO2 26 27 27   GLUCOSE 112* 121* 141*   BUN 20 21 21   CREATININE 0.90 0.91 0.95   CALCIUM 9.2 9.8 9.8                   Imaging  IR-US GUIDED PIV   Final Result    Ultrasound-guided PERIPHERAL IV INSERTION performed by    qualified nursing staff as above.      CT-ABDOMEN-PELVIS WITH   Final Result      1.  Interval placement of a surgical drain into a splenic fluid collection. The fluid collection has decreased in size with a small amount of residual fluid seen surrounding the percutaneous catheter drain tip.      2.  No evidence of bowel obstruction.      3.  Left lower quadrant ostomy site.      4.  Bibasilar atelectasis with small bilateral pleural effusions.       5.  Gallstones.      6.  Fatty liver.      7.  Anasarca.      8.  Small amount of ascites.      IR-MIDLINE CATHETER INSERTION WO GUIDANCE > AGE 3   Final Result                  Ultrasound-guided midline placement performed by qualified nursing staff    as above.          CT-DRAIN-PERITONEAL   Final Result      Successful splenic abscess drainage tube placement.      Plan: Twice daily flushes with 10 mL of sterile saline. Monitor outputs.         CT-ABDOMEN-PELVIS WITH   Final Result      1.  Intraparenchymal collection in the anterior aspect of the spleen has increased in size from the prior exam.      2.  Small amount of free fluid in the pelvis has increased from the prior exam.      3.  Small bilateral pleural effusions layer posteriorly, left greater than right, slightly decreased from the prior exam. Adjacent airspace disease is likely atelectasis.      4.  Cholelithiasis.      5.  Right renal scarring.      [id number] VIEW   Final Result      1.  No evidence of bowel obstruction.   2.  Postoperative changes as described.      CT-ABDOMEN-PELVIS W/O   Final Result      Postsurgical changes with a left abdominal stoma. Parastomal densities may be related to postsurgical hematoma measuring up to 2.8 x 2.1 cm.      Small amount of free fluid in the abdomen and pelvis.      Collection along the anterior spleen measures approximately 2.9 x 4.3 cm is decreased in size compared to the prior examination. Evaluation is limited by lack of intravenous contrast.      Third spacing.      Trace free intraperitoneal air is likely related to recent surgery.      Moderate left and small right pleural effusions with overlying atelectasis/consolidation.      Cortical scarring of the right kidney.      Cholelithiasis.         DX-CHEST-PORTABLE (1 VIEW)   Final Result      1.  Bilateral airspace and interstitial infiltrates.      2.  Cardiomegaly.      US-RENAL   Final Result    Examination was limited due to poor sound  penetration related to body habitus.   Grossly normal appearance of the kidneys.      EC-ECHOCARDIOGRAM COMPLETE W/ CONT   Final Result      DX-CHEST-PORTABLE (1 VIEW)   Final Result      No pneumothorax identified following right subclavian line placement      Worsening atelectasis with consolidation at the bases not excluded      Stable cardiac silhouette enlargement      CT-ABDOMEN-PELVIS WITH   Final Result      Interval decrease in size of perisplenic abscess. Pigtail catheter now in place      Abscess has likely fistulous communication with the abutting splenic flexure of the colon. Colon is otherwise normal in appearance      Mild hepatosplenomegaly      Decreasing small left pleural effusion      Subacute anterior wedge compression fractures in the caudal thoracic spine      CT-DRAIN-PERITONEAL   Final Result      1.  CT guided perisplenic abscess catheter drainage.   2.  The current plan is to obtain a follow-up CT in 5-7 days..      OUTSIDE IMAGES-CT CHEST   Final Result      OUTSIDE IMAGES-CT ABDOMEN /PELVIS   Final Result      CT-ABDOMEN-PELVIS WITH   Final Result   Addendum 1 of 1   Addendum:   A prior noncontrast CT of the abdomen and pelvis from [facility] dated 10/19/2020 was made available for comparison. On the    prior study a perisplenic/subcapsular fluid collection was seen however    did not contain air at that time and    there was no discrete evidence of communication with the transverse colon.    Communication with splenic flexure of the colon and air within the    collection are new from the prior study.      Final      DX-CHEST-PORTABLE (1 VIEW)   Final Result      Slight interval interval improvement in congestive heart failure.           Assessment/Plan  * Spontaneous perforation of colon (HCC)- (present on admission)  Assessment & Plan  Segmental colectomy, colostomy creation, mobilization of splenic flexure, wound VAC placement, irrigation and debridement of  flank wound 11/10/2020  Finished course of IV Vancomycin, Rocephin, Diflucan   Wound care  Pain control    Gram-positive bacteremia- (present on admission)  Assessment & Plan  Finished course of IV Vancomycin and Rocephin    Intra-abdominal abscess (HCC)- (present on admission)  Assessment & Plan  CT-guided drain 11/25/2020  CT guided Drain Placement 11/3/2020  Finished course of IV Vancomycin, Rocephin, Diflucan     SRUTHI (acute kidney injury) ([facility])  Assessment & Plan  Follow BMP    Sepsis (HCC)  Assessment & Plan  Secondary to perforation of colon, splenic abscess    Anxiety and depression- (present on admission)  Assessment & Plan  Risperdal, Trazodone    Hypothyroidism- (present on admission)  Assessment & Plan  Levothyroxine 112 mcg   Check TSH on 12/21/2020    Prolonged QT interval- (present on admission)  Assessment & Plan  Cardiac monitor  follow EKG    NICM (nonischemic cardiomyopathy) ([facility])- (present on admission)  Assessment & Plan  Toprol, Lisinopril, Lasix, Aldactone    Suicidal ideation  Assessment & Plan  Off Legal hold    Chronic systolic heart failure (HCC)- (present on admission)  Assessment & Plan  Toprol, Lisinopril, Aldactone, Lasix    Urinary retention- (present on admission)  Assessment & Plan  Zabala placed  Start Flomax    Chronic atrial fibrillation- (present on admission)  Assessment & Plan  Amiodarone, Toprol  Was not on anticoagulation    Essential hypertension- (present on admission)  Assessment & Plan  Toprol-XL  Decrease Lisinopril to 2.5 mg  Decrease Aldactone to 12.5 mg    Type 2 diabetes mellitus with hyperglycemia, without long-term current use of insulin ([facility])- (present on admission)  Assessment & Plan  Lantus, SSI      Hypomagnesemia- (present on admission)  Assessment & Plan  IV Mg 2 g  Follow level    Hypokalemia- (present on admission)  Assessment & Plan  Follow bmp    Elevated digoxin level  Assessment & Plan  Hold digoxin       VTE prophylaxis: SCD

## 2020-12-14 NOTE — ASSESSMENT & PLAN NOTE
Zabala placed  Start Flomax   Performs ADLs  [ x ]Yes  [  ]No , yes +1  Walk indoors [ x ]Yes  [  ] No, yes +1  Walk  2 blocks on level ground [  ]Yes  [  x]No, yes +2  Can do light exercise (eg. throw a football, golf, bowling) [  ]Yes | [  x]No,  yes +3  Have sexual relations [  ]Yes  [ x ]No, yes +3  Can perform yard work  [  ]Yes   [x ]No, yes +4  Do heavy work around the house(eg. lift or move heavy furniture) [  ]Yes   [ x ]No, yes +4  Score 2

## 2020-12-14 NOTE — DISCHARGE PLANNING
Agency/Facility Name: [facility]  Spoke To: Sathish  Outcome: Facility can take pt. If xavier bed is bought for pt. But referral will need to be seen by DON. Facility notified this [facility] that they've had another positive Covid test.  RNBRANDON Olivera notified    Received Choice form at [phone]  [facility]ency[facility]Fac[facility] Name: [facility]  Referral sent per Choice form @ [phone]

## 2020-12-15 LAB
ANION GAP SERPL CALC-SCNC: 10 MMOL/L (ref 7–16)
BASOPHILS # BLD AUTO: 1.1 % (ref 0–1.8)
BASOPHILS # BLD: 0.1 K/UL (ref 0–0.12)
BUN SERPL-MCNC: 24 MG/DL (ref 8–22)
CALCIUM SERPL-MCNC: 9.7 MG/DL (ref 8.5–10.5)
CHLORIDE SERPL-SCNC: 96 MMOL/L (ref 96–112)
CO2 SERPL-SCNC: 25 MMOL/L (ref 20–33)
CREAT SERPL-MCNC: 0.9 MG/DL (ref 0.5–1.4)
EOSINOPHIL # BLD AUTO: 0.42 K/UL (ref 0–0.51)
EOSINOPHIL NFR BLD: 4.6 % (ref 0–6.9)
ERYTHROCYTE [DISTWIDTH] IN BLOOD BY AUTOMATED COUNT: 58.5 FL (ref 35.9–50)
GLUCOSE BLD-MCNC: 127 MG/DL (ref 65–99)
GLUCOSE BLD-MCNC: 127 MG/DL (ref 65–99)
GLUCOSE BLD-MCNC: 140 MG/DL (ref 65–99)
GLUCOSE BLD-MCNC: 197 MG/DL (ref 65–99)
GLUCOSE SERPL-MCNC: 130 MG/DL (ref 65–99)
HCT VFR BLD AUTO: 37.9 % (ref 42–52)
HGB BLD-MCNC: 11.5 G/DL (ref 14–18)
IMM GRANULOCYTES # BLD AUTO: 0.05 K/UL (ref 0–0.11)
IMM GRANULOCYTES NFR BLD AUTO: 0.5 % (ref 0–0.9)
LYMPHOCYTES # BLD AUTO: 2.55 K/UL (ref 1–4.8)
LYMPHOCYTES NFR BLD: 27.9 % (ref 22–41)
MAGNESIUM SERPL-MCNC: 1.9 MG/DL (ref 1.5–2.5)
MCH RBC QN AUTO: 26.1 PG (ref 27–33)
MCHC RBC AUTO-ENTMCNC: 30.3 G/DL (ref 33.7–35.3)
MCV RBC AUTO: 86.1 FL (ref 81.4–97.8)
MONOCYTES # BLD AUTO: 0.85 K/UL (ref 0–0.85)
MONOCYTES NFR BLD AUTO: 9.3 % (ref 0–13.4)
NEUTROPHILS # BLD AUTO: 5.16 K/UL (ref 1.82–7.42)
NEUTROPHILS NFR BLD: 56.6 % (ref 44–72)
NRBC # BLD AUTO: 0 K/UL
NRBC BLD-RTO: 0 /100 WBC
PLATELET # BLD AUTO: 344 K/UL (ref 164–446)
PMV BLD AUTO: 9.7 FL (ref 9–12.9)
POTASSIUM SERPL-SCNC: 4.5 MMOL/L (ref 3.6–5.5)
RBC # BLD AUTO: 4.4 M/UL (ref 4.7–6.1)
SODIUM SERPL-SCNC: 131 MMOL/L (ref 135–145)
WBC # BLD AUTO: 9.1 K/UL (ref 4.8–10.8)

## 2020-12-15 PROCEDURE — 700102 HCHG RX REV CODE 250 W/ 637 OVERRIDE(OP): Performed by: FAMILY MEDICINE

## 2020-12-15 PROCEDURE — A9270 NON-COVERED ITEM OR SERVICE: HCPCS | Performed by: STUDENT IN AN ORGANIZED HEALTH CARE EDUCATION/TRAINING PROGRAM

## 2020-12-15 PROCEDURE — 85025 COMPLETE CBC W/AUTO DIFF WBC: CPT

## 2020-12-15 PROCEDURE — 97530 THERAPEUTIC ACTIVITIES: CPT

## 2020-12-15 PROCEDURE — 700102 HCHG RX REV CODE 250 W/ 637 OVERRIDE(OP): Performed by: STUDENT IN AN ORGANIZED HEALTH CARE EDUCATION/TRAINING PROGRAM

## 2020-12-15 PROCEDURE — 82962 GLUCOSE BLOOD TEST: CPT

## 2020-12-15 PROCEDURE — 80048 BASIC METABOLIC PNL TOTAL CA: CPT

## 2020-12-15 PROCEDURE — 36415 COLL VENOUS BLD VENIPUNCTURE: CPT

## 2020-12-15 PROCEDURE — 97535 SELF CARE MNGMENT TRAINING: CPT

## 2020-12-15 PROCEDURE — 770020 HCHG ROOM/CARE - TELE (206)

## 2020-12-15 PROCEDURE — 700111 HCHG RX REV CODE 636 W/ 250 OVERRIDE (IP): Performed by: FAMILY MEDICINE

## 2020-12-15 PROCEDURE — 83735 ASSAY OF MAGNESIUM: CPT

## 2020-12-15 PROCEDURE — A9270 NON-COVERED ITEM OR SERVICE: HCPCS | Performed by: FAMILY MEDICINE

## 2020-12-15 PROCEDURE — 99232 SBSQ HOSP IP/OBS MODERATE 35: CPT | Performed by: FAMILY MEDICINE

## 2020-12-15 RX ORDER — MAGNESIUM SULFATE HEPTAHYDRATE 40 MG/ML
2 INJECTION, SOLUTION INTRAVENOUS ONCE
Status: COMPLETED | OUTPATIENT
Start: 2020-12-15 | End: 2020-12-15

## 2020-12-15 RX ORDER — DEXTROSE MONOHYDRATE 25 G/50ML
50 INJECTION, SOLUTION INTRAVENOUS
Status: DISCONTINUED | OUTPATIENT
Start: 2020-12-15 | End: 2020-12-17 | Stop reason: HOSPADM

## 2020-12-15 RX ADMIN — FUROSEMIDE 20 MG: 20 TABLET ORAL at 06:22

## 2020-12-15 RX ADMIN — RISPERIDONE 0.5 MG: 1 TABLET ORAL at 06:22

## 2020-12-15 RX ADMIN — INSULIN GLARGINE 15 UNITS: 100 INJECTION, SOLUTION SUBCUTANEOUS at 06:27

## 2020-12-15 RX ADMIN — METOPROLOL SUCCINATE 50 MG: 50 TABLET, EXTENDED RELEASE ORAL at 06:22

## 2020-12-15 RX ADMIN — FAMOTIDINE 20 MG: 20 TABLET, FILM COATED ORAL at 06:22

## 2020-12-15 RX ADMIN — AMIODARONE HYDROCHLORIDE 200 MG: 200 TABLET ORAL at 06:22

## 2020-12-15 RX ADMIN — LEVOTHYROXINE SODIUM 112 MCG: 112 TABLET ORAL at 06:22

## 2020-12-15 RX ADMIN — MAGNESIUM SULFATE 2 G: 2 INJECTION INTRAVENOUS at 09:57

## 2020-12-15 RX ADMIN — MODAFINIL 100 MG: 100 TABLET ORAL at 06:22

## 2020-12-15 RX ADMIN — LISINOPRIL 2.5 MG: 5 TABLET ORAL at 06:22

## 2020-12-15 RX ADMIN — RISPERIDONE 1 MG: 1 TABLET ORAL at 18:06

## 2020-12-15 RX ADMIN — SPIRONOLACTONE 12.5 MG: 50 TABLET ORAL at 06:23

## 2020-12-15 RX ADMIN — TAMSULOSIN HYDROCHLORIDE 0.4 MG: 0.4 CAPSULE ORAL at 07:27

## 2020-12-15 RX ADMIN — CETIRIZINE HYDROCHLORIDE 10 MG: 10 TABLET, FILM COATED ORAL at 06:22

## 2020-12-15 ASSESSMENT — COGNITIVE AND FUNCTIONAL STATUS - GENERAL
DRESSING REGULAR UPPER BODY CLOTHING: A LITTLE
MOBILITY SCORE: 17
MOVING FROM LYING ON BACK TO SITTING ON SIDE OF FLAT BED: A LOT
SUGGESTED CMS G CODE MODIFIER DAILY ACTIVITY: CK
PERSONAL GROOMING: A LITTLE
SUGGESTED CMS G CODE MODIFIER MOBILITY: CK
DAILY ACTIVITIY SCORE: 15
HELP NEEDED FOR BATHING: A LOT
STANDING UP FROM CHAIR USING ARMS: A LOT
DRESSING REGULAR LOWER BODY CLOTHING: A LOT
WALKING IN HOSPITAL ROOM: A LITTLE
TOILETING: TOTAL
CLIMB 3 TO 5 STEPS WITH RAILING: A LOT

## 2020-12-15 ASSESSMENT — ENCOUNTER SYMPTOMS
MYALGIAS: 0
FLANK PAIN: 0
DIAPHORESIS: 0
ABDOMINAL PAIN: 0
WHEEZING: 0
HEARTBURN: 0
FEVER: 0
VOMITING: 0
HEADACHES: 0
PALPITATIONS: 0
FOCAL WEAKNESS: 0
SHORTNESS OF BREATH: 0
DIZZINESS: 0
CHILLS: 0
SENSORY CHANGE: 0
BLURRED VISION: 0
NECK PAIN: 0
NAUSEA: 0
SORE THROAT: 0
BACK PAIN: 0
SPEECH CHANGE: 0
DIARRHEA: 0
WEAKNESS: 1
COUGH: 0
NERVOUS/ANXIOUS: 0

## 2020-12-15 ASSESSMENT — GAIT ASSESSMENTS
GAIT LEVEL OF ASSIST: MINIMAL ASSIST
ASSISTIVE DEVICE: FRONT WHEEL WALKER
DISTANCE (FEET): 45

## 2020-12-15 ASSESSMENT — PAIN DESCRIPTION - PAIN TYPE: TYPE: ACUTE PAIN

## 2020-12-15 NOTE — WOUND TEAM
[facility]  Inpatient Services  Wound and Skin Care Progress Note    Admission Date: 11/3/2020     Last order of IP CONSULT TO WOUND CARE was found on 11/16/2020 from Hospital Encounter on 11/3/2020     HPI, PMH, SH: Reviewed    Unit where seen by Wound Team: [id number]     WOUND CONSULT/FOLLOW UP RELATED TO:  Scheduled abdominal NPWT dressing change.  See separate Ostomy note    Self Report / Pain Level:  Tolerated well without pain medication.     OBJECTIVE:  NPWT intact. Colostomy intact. On bariatric DARYL. No longer on Legal hold    WOUND TYPE, LOCATION, CHARACTERISTICS (Pressure Injuries: location, stage, POA or date identified)      Negative Pressure Wound Therapy 11/10/20 Surgical Abdomen Midline (Active)   Vacuum Serial Number [id number]    NPWT Pump Mode / Pressure Setting Ulta;Continuous;125 mmHg    Dressing Type Medium;Black Foam (Regular)    Number of Foam Pieces Used 2    Canister Changed No    Output (mL) 5 mL    NEXT Dressing Change/Treatment Date 12/16/20    VAC VeraFlo Pressure (mm/Hg) Continuous    WOUND NURSE ONLY - Time Spent with Patient (mins) 75      Wound 11/10/20 Full Thickness Wound Abdomen Midline wound vac (Active)   Wound Image   12/11/20 1130   Site Assessment Red;Early/partial granulation    Periwound Assessment Clean;Dry;Intact    Margins Attached edges;Defined edges    Closure Secondary intention    Drainage Amount Scant    Drainage Description Serosanguineous    Treatments Cleansed;Site care    Wound Cleansing Approved Wound Cleanser    Periwound Protectant Drape;Skin Protectant Wipes to Periwound    Dressing Cleansing/Solutions Not Applicable    Dressing Options Wound Vac    Dressing Changed Changed    Dressing Status Clean;Dry;Intact    Dressing Change/Treatment Frequency Monday, Wednesday, Friday, and As Needed    NEXT Dressing Change/Treatment Date 12/16/20    NEXT Weekly Photo (Inpatient Only) 12/18/20    Number of Staples Removed 10    Non-staged Wound Description  Full thickness    Wound Length (cm) 14 cm    Wound Width (cm) 5 cm    Wound Depth (cm) 1.5 cm    Wound Surface Area (cm^2) 70 cm^2    Wound Volume (cm^3) 105 cm^3    Wound Healing % 86    Wound Bed Granulation (%) 80 %    Tunneling (cm) 1 cm    Tunneling Clock Position of Wound 12    Undermining (cm) 0 cm    Shape Linear    Wound Odor None    Exposed Structures None    WOUND NURSE ONLY - Time Spent with Patient (mins) 60      Vascular:    SHAYAN:   No results found.    Lab Values:    Lab Results   Component Value Date/Time    WBC 10.3 12/14/2020 06:23 AM    RBC 4.60 (L) 12/14/2020 06:23 AM    HEMOGLOBIN 11.9 (L) 12/14/2020 06:23 AM    HEMATOCRIT 39.9 (L) 12/14/2020 06:23 AM    CREACTPROT 7.52 (H) 11/29/2020 01:25 AM    SEDRATEWES 13 10/23/2018 08:02 AM    HBA1C 7.8 (H) 11/10/2020 03:31 AM        Culture Results show:  Recent Results (from the past 720 hour(s))   CULTURE WOUND W/ GRAM STAIN    Collection Time: 11/25/20  4:52 PM    Specimen: Wound   Result Value Ref Range    Significant Indicator POS (POS)     Source WND     Site Joya-splenic drain     Culture Result (A)      Growth noted after further incubation, see below for  organism identification.      Gram Stain Result Moderate WBCs.  Rare Gram positive cocci.       Culture Result Enterococcus faecalis  Rare growth   (A)        Susceptibility    Enterococcus faecalis - ISAMAR     Daptomycin 4 Sensitive mcg/mL     Ampicillin <=2 Sensitive mcg/mL     Gent Synergy <=500 Sensitive mcg/mL     Vancomycin 1 Sensitive mcg/mL     Penicillin 2 Sensitive mcg/mL       INTERVENTIONS BY WOUND TEAM:  Previous dressing removed, cleanse wound with NS and gauze.  Drape applied to periwound.  Distal wound covered with hydrocolloid thin.  2 pieces of black foam to wound bed, sealed with drape.  Track pad applied, suction obtained.      Interdisciplinary consultation: Patient, RN    EVALUATION / RATIONALE FOR TREATMENT: abdominal wound fully granulated. Inferior wounds surface level,  "hydrocolloid thin to promote moist wound healing.  Periwound improved.       Goals: Steady decrease in wound area and depth weekly.    NURSING PLAN OF CARE ORDERS (X):    Dressing changes: See Dressing Care orders: X  Skin care: See Skin Care orders: X  RN Prevention Protocol: X  Rectal tube care: See Rectal Tube Care orders:   Other orders:       WOUND TEAM PLAN OF CARE:   Dressing changes by wound team:                   Follow up 3 times weekly:                NPWT change 3 times weekly: X    Follow up 1-2 times weekly:      Follow up Bi-Monthly:                   Follow up as needed:       Other (explain):     Anticipated discharge plans:   LTACH:        SNF/Rehab:  X                Home Health Care:           Outpatient Wound Center:            Self Care:               [facility]  Inpatient Services  New Ostomy Management & Teaching    HPI:  Reviewed  PMH: Reviewed   SH: Reviewed  Objective: appliance intact, states it was changed this morning due to leaking.                  Colostomy 11/10/20 Transverse LUQ (Active)   Wound Image      Stomal Appliance Assessment Clean;Intact;Changed    Stoma Assessment Red    Stoma Shape Budded Less Than One Inch    Stoma Size (in) 1    Peristomal Assessment Pink    Mucocutaneous Junction Intact    Treatment Appliance Changed    Peristomal Protectant No Sting Skin Prep    Stomal Appliance Paste Ring, 2\";2 1/4\" (57mm) CTF    Output (mL) 150 mL    Output Color Brown    WOUND RN ONLY - Stomal Appliance  2 Piece;Paste Ring, 2\";2 1/4\" (57mm) CTF    Appliance (Pouch) # [id number]    Appliance Brand Daisy    Appliance Supplier [employer]    Secure Start completed Yes    Ostomy Care Resources Provided [facility] Tip Sheet    WOUND NURSE ONLY - Time Spent with Patient (mins) 30       Ostomy Appliance (type and size): 2 1/4\" 2 pc w/ [place]    Interventions: Previous appliance removed, cleansed skin with warm wash cloth.  barrier cut and paste ring applied, applied to skin.  Pouch " closed and attached.      Pt education: Questions and concerns addressed    Evaluation: Stoma viable, producing gas and stool. Pt requiring staff assistance to perform ostomy care. Likely discharging to SNF.    Plan: Ostomy nurses to continue to follow for ostomy needs and teaching     Anticipated discharge needs: Supplies, supplier information, possible HH or outpatient ostomy clinic

## 2020-12-15 NOTE — DISCHARGE PLANNING
Anticipated Discharge Disposition:   · SNF    Action:   · Discussed pt's POC in IDT rounds  · RN BRANDON spoke with [facility] admission coordinator. Per Noah, pt is being considered for admission pending authorization with the DON (using an LOC to provide a bariatric bed). Per Noah, the facility currently has Covid cases and is not accepting new patients for at least 2 weeks from today.   · RN BRANDON obtained verbal choice from the pt at bedside for [facility] SN[facility] and faxed choice to [facility].     Barriers to Discharge: SNF acceptance    Plan: Cm to f/u with [facility] regarding referrals for SNF. Continue to collaborate with the pt, pt's family, and health care team to provide social and discharge support as needed.

## 2020-12-15 NOTE — THERAPY
"Occupational Therapy  Daily Treatment     Patient Name: Sebastián Castro  Age:  55 y.o., Sex:  male  Medical Record #: [id number]  Today's Date: 12/15/2020     Precautions  Precautions: (P) Fall Risk  Comments: (P) wound vac, BETTE drain, ostomy    Assessment    Pt seen for follow up OT tx session, pt limited by decreased activity tolerance 2* to decreased BP in standing, pt able to complete short bout of ambulation before needing to return to room and into chair as patient feeling faint and felt as if he was going to pass out. Pts BP monitored multiple times and RN alert as patient stated BP lower than baseline, helped return patient back to bed safely and patient began having less symptoms. Will continue to see patient for skilled therapy while admitted to address deficits as well as recommend post-acute placement.    Plan    Continue current treatment plan.    DC Equipment Recommendations: (P) Unable to determine at this time  Discharge Recommendations: (P) Recommend post-acute placement for additional occupational therapy services prior to discharge home    Subjective    \"I'm seeing dots\"     Objective       12/15/20 1138   Precautions   Precautions Fall Risk   Comments wound vac, BETTE drain, ostomy   Pain 0 - 10 Group   Therapist Pain Assessment Post Activity Pain Same as Prior to Activity;Nurse Notified;0   Non Verbal Descriptors   Non Verbal Scale  Calm   Cognition    Cognition / Consciousness WDL   Level of Consciousness Alert   Other Treatments   Other Treatments Provided pt became hypotensive during ambulation needing to quickly return to chair, pts BP monitored multiple times (1002/60s) RN alerted, assisted patient back to bed   Balance   Sitting Balance (Static) Fair   Sitting Balance (Dynamic) Fair   Standing Balance (Static) Fair   Standing Balance (Dynamic) Fair   Weight Shift Sitting Fair   Weight Shift Standing Fair   Skilled Intervention Verbal Cuing   Comments w/ FWW   Bed Mobility    Supine to Sit " Supervised   Sit to Supine Supervised   Scooting Supervised   Rolling Supervised   Skilled Intervention Verbal Cuing;Postural Facilitation   Activities of Daily Living   Upper Body Dressing Supervision   Lower Body Dressing Maximal Assist   Skilled Intervention Verbal Cuing   How much help from another person does the patient currently need...   Putting on and taking off regular lower body clothing? 2   Bathing (including washing, rinsing, and drying)? 2   Toileting, which includes using a toilet, bedpan, or urinal? 1   Putting on and taking off regular upper body clothing? 3   Taking care of personal grooming such as brushing teeth? 3   Eating meals? 4   6 Clicks Daily Activity Score 15   Functional Mobility   Sit to Stand Moderate Assist   Bed, Chair, Wheelchair Transfer Moderate Assist   Transfer Method Stand Step   Mobility bed mobility, ambulating to hallway, pt quickly needing to turn around and sit because of faint feeling   Comments w/ FWW   Activity Tolerance   Sitting in Chair 5   Sitting Edge of Bed 15   Standing 5   Patient / Family Goals   Patient / Family Goal #1 To get my strength back   Goal #1 Outcome Progressing slower than expected   Short Term Goals   Short Term Goal # 2 Pt will dress LB with AE & Min A   Goal Outcome # 2 Progressing slower than expected   Short Term Goal # 3 Pt will be Mod A with BSC transfers   Goal Outcome # 3 Progressing slower than expected   Education Group   Education Provided Role of Occupational Therapist   Role of Occupational Therapist Patient Response Patient;Acceptance;Explanation   Interdisciplinary Plan of Care Collaboration   IDT Collaboration with  Nursing   Patient Position at End of Therapy In Bed;Call Light within Reach;Tray Table within Reach;Phone within Reach   Collaboration Comments RN updated on session, in room as patient BP low

## 2020-12-15 NOTE — PROGRESS NOTES
Surgery    55-year-old male with history of trauma with a spleen injury back in September with subsequent perforation at the splenic flexure status post segmental colectomy with colostomy and Interiano's pouch back in November.  Patient still has epigastric wound VAC and an IR placed drain.    Patient has been unable to follow-up in my office secondary to ongoing hospitalization.  I am seeing him today for standard postop visit    Patient feels generally well.  He is waiting for transition to long-term care.  Drain output has been minimal.    /68   Pulse 76   Temp 36.2 °C (97.1 °F) (Temporal)   Resp 17   Ht 1.829 m (6')   Wt 123 kg (271 lb 2.7 oz)   SpO2 97%   BMI 36.78 kg/m²     Abdomen soft, nontender, drain site clean.  Drain removed without incident and dressing placed.  Midline wound VAC is intact.  Ostomy viable and producing stool.    Recent Labs     12/13/20  0632 12/14/20  0623 12/15/20  0535   WBC 9.2 10.3 9.1   RBC 4.29* 4.60* 4.40*   HEMOGLOBIN 11.1* 11.9* 11.5*   HEMATOCRIT 36.9* 39.9* 37.9*   MCV 86.0 86.7 86.1   MCH 25.9* 25.9* 26.1*   RDW 59.5* 59.7* 58.5*   PLATELETCT 441 384 344   MPV 10.0 9.6 9.7   NEUTSPOLYS 54.30 54.50 56.60   LYMPHOCYTES 28.70 29.30 27.90   MONOCYTES 9.50 9.30 9.30   EOSINOPHILS 5.80 5.10 4.60   BASOPHILS 1.00 1.30 1.10   RBCMORPHOLO  --  Present  --      Assessment and plan:  36 days status post segmental colectomy with Patti procedure for what is suspect to be a posttraumatic ischemic perforation of the splenic flexure.    Usually repeat CT scan prior to IR drain removal but drain output has been minimal and patient is not septic, so drain removed today.    Continue supportive care per medical service for numerous other acute and subacute issues.    Surgical service signing off, please have patient follow-up in my office after discharge

## 2020-12-15 NOTE — DISCHARGE PLANNING
Agency/Facility Name: [facility]  Outcome: L/M for admissions requesting referral status     Agency/Facility Name: [facility]  Referral sent per Choice form @ 6692

## 2020-12-15 NOTE — CARE PLAN
Problem: Safety  Goal: Will remain free from injury  Outcome: PROGRESSING AS EXPECTED  Patient reoriented to fall precautions. Using call light appropriately. Bed low/locked, non skid socks in place. Patient remains free from injury      Problem: Mobility  Goal: Risk for activity intolerance will decrease  Outcome: PROGRESSING AS EXPECTED  Patient worked with PT today. Encouraged to be OOB for meals.

## 2020-12-15 NOTE — PROGRESS NOTES
Assumed care of patient at 0645. Assessment complete.  AA&Ox4. Denies CP/SOB.  Denies pain at this time. Pain managed per MAR.  LLQ ostomy with adequate output, appliance intact.   Zabala in place draining to gravity per orders for urinary retention.  LLQ IR drain to bulb suction, flushed per order.   Wound vac in place, appliance checked, patent.  Blanchable redness to sacrum  Preventative mepilex in place to bilateral heels.   IV patent.  Tolerating diet. Denies N/V.  Patient up max assist, Q2 turns, patient repositions self frequently.  Patient in much better spirits today. All needs met at this time. Call light within reach. Pt calls appropriately. Will continue to monitor.

## 2020-12-16 PROBLEM — A41.9 SEPSIS (HCC): Status: RESOLVED | Noted: 2020-11-08 | Resolved: 2020-12-16

## 2020-12-16 PROBLEM — R78.81 GRAM-POSITIVE BACTEREMIA: Status: RESOLVED | Noted: 2020-12-08 | Resolved: 2020-12-16

## 2020-12-16 PROBLEM — N17.9 AKI (ACUTE KIDNEY INJURY) (HCC): Status: RESOLVED | Noted: 2020-11-11 | Resolved: 2020-12-16

## 2020-12-16 PROBLEM — K65.1 INTRA-ABDOMINAL ABSCESS (HCC): Status: RESOLVED | Noted: 2020-12-08 | Resolved: 2020-12-16

## 2020-12-16 LAB
ANION GAP SERPL CALC-SCNC: 12 MMOL/L (ref 7–16)
BUN SERPL-MCNC: 29 MG/DL (ref 8–22)
CALCIUM SERPL-MCNC: 9.9 MG/DL (ref 8.5–10.5)
CHLORIDE SERPL-SCNC: 94 MMOL/L (ref 96–112)
CO2 SERPL-SCNC: 25 MMOL/L (ref 20–33)
CREAT SERPL-MCNC: 0.97 MG/DL (ref 0.5–1.4)
GLUCOSE BLD-MCNC: 126 MG/DL (ref 65–99)
GLUCOSE BLD-MCNC: 129 MG/DL (ref 65–99)
GLUCOSE BLD-MCNC: 158 MG/DL (ref 65–99)
GLUCOSE SERPL-MCNC: 141 MG/DL (ref 65–99)
MAGNESIUM SERPL-MCNC: 1.9 MG/DL (ref 1.5–2.5)
POTASSIUM SERPL-SCNC: 4.5 MMOL/L (ref 3.6–5.5)
SODIUM SERPL-SCNC: 131 MMOL/L (ref 135–145)

## 2020-12-16 PROCEDURE — 700102 HCHG RX REV CODE 250 W/ 637 OVERRIDE(OP): Performed by: FAMILY MEDICINE

## 2020-12-16 PROCEDURE — 83735 ASSAY OF MAGNESIUM: CPT

## 2020-12-16 PROCEDURE — A9270 NON-COVERED ITEM OR SERVICE: HCPCS | Performed by: FAMILY MEDICINE

## 2020-12-16 PROCEDURE — 97605 NEG PRS WND THER DME<=50SQCM: CPT

## 2020-12-16 PROCEDURE — A9270 NON-COVERED ITEM OR SERVICE: HCPCS | Performed by: STUDENT IN AN ORGANIZED HEALTH CARE EDUCATION/TRAINING PROGRAM

## 2020-12-16 PROCEDURE — 99232 SBSQ HOSP IP/OBS MODERATE 35: CPT | Performed by: INTERNAL MEDICINE

## 2020-12-16 PROCEDURE — A9270 NON-COVERED ITEM OR SERVICE: HCPCS | Performed by: PSYCHIATRY & NEUROLOGY

## 2020-12-16 PROCEDURE — 700102 HCHG RX REV CODE 250 W/ 637 OVERRIDE(OP): Performed by: STUDENT IN AN ORGANIZED HEALTH CARE EDUCATION/TRAINING PROGRAM

## 2020-12-16 PROCEDURE — 36415 COLL VENOUS BLD VENIPUNCTURE: CPT

## 2020-12-16 PROCEDURE — 82962 GLUCOSE BLOOD TEST: CPT | Mod: 91

## 2020-12-16 PROCEDURE — 700111 HCHG RX REV CODE 636 W/ 250 OVERRIDE (IP): Performed by: INTERNAL MEDICINE

## 2020-12-16 PROCEDURE — 80048 BASIC METABOLIC PNL TOTAL CA: CPT

## 2020-12-16 PROCEDURE — 770020 HCHG ROOM/CARE - TELE (206)

## 2020-12-16 PROCEDURE — 700102 HCHG RX REV CODE 250 W/ 637 OVERRIDE(OP): Performed by: PSYCHIATRY & NEUROLOGY

## 2020-12-16 RX ORDER — HEPARIN SODIUM 5000 [USP'U]/ML
5000 INJECTION, SOLUTION INTRAVENOUS; SUBCUTANEOUS EVERY 8 HOURS
Status: DISCONTINUED | OUTPATIENT
Start: 2020-12-16 | End: 2020-12-17 | Stop reason: HOSPADM

## 2020-12-16 RX ADMIN — TAMSULOSIN HYDROCHLORIDE 0.4 MG: 0.4 CAPSULE ORAL at 09:19

## 2020-12-16 RX ADMIN — INSULIN GLARGINE 15 UNITS: 100 INJECTION, SOLUTION SUBCUTANEOUS at 06:33

## 2020-12-16 RX ADMIN — TRAZODONE HYDROCHLORIDE 50 MG: 50 TABLET ORAL at 21:12

## 2020-12-16 RX ADMIN — HEPARIN SODIUM 5000 UNITS: 5000 INJECTION, SOLUTION INTRAVENOUS; SUBCUTANEOUS at 14:58

## 2020-12-16 RX ADMIN — SPIRONOLACTONE 12.5 MG: 50 TABLET ORAL at 04:17

## 2020-12-16 RX ADMIN — HEPARIN SODIUM 5000 UNITS: 5000 INJECTION, SOLUTION INTRAVENOUS; SUBCUTANEOUS at 21:11

## 2020-12-16 RX ADMIN — RISPERIDONE 0.5 MG: 1 TABLET ORAL at 04:16

## 2020-12-16 RX ADMIN — AMIODARONE HYDROCHLORIDE 200 MG: 200 TABLET ORAL at 04:16

## 2020-12-16 RX ADMIN — CETIRIZINE HYDROCHLORIDE 10 MG: 10 TABLET, FILM COATED ORAL at 04:16

## 2020-12-16 RX ADMIN — FUROSEMIDE 20 MG: 20 TABLET ORAL at 04:16

## 2020-12-16 RX ADMIN — RISPERIDONE 1 MG: 1 TABLET ORAL at 17:19

## 2020-12-16 RX ADMIN — HYDROCODONE BITARTRATE AND ACETAMINOPHEN 1 TABLET: 5; 325 TABLET ORAL at 17:18

## 2020-12-16 RX ADMIN — METOPROLOL SUCCINATE 50 MG: 50 TABLET, EXTENDED RELEASE ORAL at 17:19

## 2020-12-16 RX ADMIN — FUROSEMIDE 20 MG: 20 TABLET ORAL at 17:19

## 2020-12-16 RX ADMIN — MODAFINIL 100 MG: 100 TABLET ORAL at 04:16

## 2020-12-16 RX ADMIN — LEVOTHYROXINE SODIUM 112 MCG: 112 TABLET ORAL at 04:16

## 2020-12-16 RX ADMIN — METOPROLOL SUCCINATE 50 MG: 50 TABLET, EXTENDED RELEASE ORAL at 04:16

## 2020-12-16 RX ADMIN — HYDROCODONE BITARTRATE AND ACETAMINOPHEN 2 TABLET: 5; 325 TABLET ORAL at 09:19

## 2020-12-16 ASSESSMENT — ENCOUNTER SYMPTOMS
WEAKNESS: 1
BLURRED VISION: 0
VOMITING: 0
SORE THROAT: 0
HEADACHES: 0
FEVER: 0
ABDOMINAL PAIN: 0
MYALGIAS: 0
NERVOUS/ANXIOUS: 0
SHORTNESS OF BREATH: 0
DIZZINESS: 0
DIARRHEA: 0

## 2020-12-16 ASSESSMENT — PAIN DESCRIPTION - PAIN TYPE
TYPE: ACUTE PAIN

## 2020-12-16 ASSESSMENT — FIBROSIS 4 INDEX: FIB4 SCORE: 1

## 2020-12-16 NOTE — WOUND TEAM
[facility]  Inpatient Services  Wound and Skin Care Progress Note    Admission Date: 11/3/2020     Last order of IP CONSULT TO WOUND CARE was found on 11/16/2020 from Hospital Encounter on 11/3/2020     HPI, PMH, SH: Reviewed    Unit where seen by Wound Team: [id number]     WOUND CONSULT/FOLLOW UP RELATED TO:  Scheduled abdominal NPWT dressing change.  See separate Ostomy note    Self Report / Pain Level:  Tolerated well without pain medication.     OBJECTIVE:  NPWT intact. Colostomy intact. On bariatric DARYL. No longer on Legal hold    WOUND TYPE, LOCATION, CHARACTERISTICS (Pressure Injuries: location, stage, POA or date identified)     Negative Pressure Wound Therapy 11/10/20 Surgical Abdomen Midline (Active)   Vacuum Serial Number [id number] 11/27/20 2100   NPWT Pump Mode / Pressure Setting Ulta;Continuous;125 mmHg 12/16/20 1026   Dressing Type Medium;Black Foam (Regular) 12/16/20 1026   Number of Foam Pieces Used 1 12/16/20 1026   Canister Changed No 12/16/20 1026   Output (mL) 0 mL 12/16/20 0358   NEXT Dressing Change/Treatment Date 12/18/20 12/16/20 1026   VAC VeraFlo Pressure (mm/Hg) Continuous 12/15/20 0734   WOUND NURSE ONLY - Time Spent with Patient (mins) 60 12/16/20 1026           Wound 11/10/20 Full Thickness Wound Abdomen Midline wound vac (Active)   Wound Image   12/16/20 1026   Site Assessment Red;Fully granulated 12/16/20 1026   Periwound Assessment Clean;Dry;Intact 12/16/20 1026   Margins Defined edges;Attached edges 12/16/20 1026   Closure Secondary intention 12/16/20 1026   Drainage Amount Scant 12/16/20 1026   Drainage Description Serosanguineous 12/16/20 1026   Treatments Cleansed;Irrigation;Site care 12/16/20 1026   Wound Cleansing Approved Wound Cleanser 12/16/20 1026   Periwound Protectant Skin Protectant Wipes to Periwound;Drape 12/16/20 1026   Dressing Cleansing/Solutions Not Applicable 12/16/20 1026   Dressing Options Collagen Dressing;Wound Vac 12/16/20 1026   Dressing Changed  Changed 12/16/20 1026   Dressing Status Clean;Dry;Intact 12/16/20 1026   Dressing Change/Treatment Frequency Monday, Wednesday, Friday, and As Needed 12/16/20 1026   NEXT Dressing Change/Treatment Date 12/18/20 12/16/20 1026   NEXT Weekly Photo (Inpatient Only) 12/23/20 12/16/20 1026   Number of Staples Removed 10 12/09/20 1030   Non-staged Wound Description Full thickness 12/16/20 1026   Wound Length (cm) 9.3 cm 12/16/20 1026   Wound Width (cm) 4.6 cm 12/16/20 1026   Wound Depth (cm) 0.8 cm 12/16/20 1026   Wound Surface Area (cm^2) 42.78 cm^2 12/16/20 1026   Wound Volume (cm^3) 34.22 cm^3 12/16/20 1026   Wound Healing % 95 12/16/20 1026   Wound Bed Granulation (%) 80 % 11/14/20 1100   Tunneling (cm) 0 cm 12/16/20 1026   Tunneling Clock Position of Wound 12 12/11/20 1130   Undermining (cm) 0 cm 12/11/20 1130   Shape [place] 12/16/20 1026   Wound Odor None 12/16/20 1026   Exposed Structures None 12/16/20 1026   WOUND NURSE ONLY - Time Spent with Patient (mins) 60 12/16/20 1026                Vascular:    SHAYAN:   No results found.    Lab Values:    Lab Results   Component Value Date/Time    WBC 9.1 12/15/2020 05:35 AM    RBC 4.40 (L) 12/15/2020 05:35 AM    HEMOGLOBIN 11.5 (L) 12/15/2020 05:35 AM    HEMATOCRIT 37.9 (L) 12/15/2020 05:35 AM    CREACTPROT 7.52 (H) 11/29/2020 01:25 AM    SEDRATEWES 13 10/23/2018 08:02 AM    HBA1C 7.8 (H) 11/10/2020 03:31 AM        Culture Results show:  Recent Results (from the past 720 hour(s))   CULTURE WOUND W/ GRAM STAIN    Collection Time: 11/25/20  4:52 PM    Specimen: Wound   Result Value Ref Range    Significant Indicator POS (POS)     Source WND     Site Joya-splenic drain     Culture Result (A)      Growth noted after further incubation, see below for  organism identification.      Gram Stain Result Moderate WBCs.  Rare Gram positive cocci.       Culture Result Enterococcus faecalis  Rare growth   (A)        Susceptibility    Enterococcus faecalis - ISAMAR     Daptomycin 4 Sensitive  mcg/mL     Ampicillin <=2 Sensitive mcg/mL     Gent Synergy <=500 Sensitive mcg/mL     Vancomycin 1 Sensitive mcg/mL     Penicillin 2 Sensitive mcg/mL       INTERVENTIONS BY WOUND TEAM:  Previous dressing removed, cleanse wound with NS and gauze.  Drape applied to periwound.  Distal wound covered with Aquacel Ag hydrofiber secured with drape. Wound bed sprinkled with collagen and moisten with NS.  1 piece of black foam to wound bed, sealed with drape.  TRAC pad applied, suction obtained, no leaked noted.     Interdisciplinary consultation: Patient, bedside RN (Carmelina)    EVALUATION / RATIONALE FOR TREATMENT: abdominal wound fully granulated. Inferior wounds surface level, Aquacel Ag hydrofiber to promote moist wound healing, collagen to help promote epithelialization of tissue. Periwound improved.       Goals: Steady decrease in wound area and depth weekly.    NURSING PLAN OF CARE ORDERS (X):    Dressing changes: See Dressing Care orders: X  Skin care: See Skin Care orders: X  RN Prevention Protocol: X  Rectal tube care: See Rectal Tube Care orders:   Other orders:       WOUND TEAM PLAN OF CARE:   Dressing changes by wound team:                   Follow up 3 times weekly:                NPWT change 3 times weekly: X    Follow up 1-2 times weekly:      Follow up Bi-Monthly:                   Follow up as needed:       Other (explain):     Anticipated discharge plans:   LTACH:        SNF/Rehab:  X                Home Health Care:           Outpatient Wound Center:            Self Care:               [facility]  Inpatient Services  New Ostomy Management & Teaching    HPI:  Reviewed  PMH: Reviewed   SH: Reviewed  Objective: appliance intact, states it was changed this morning due to leaking.                  Colostomy 11/10/20 Transverse LUQ (Active)   Wound Image   11/30/20 0900   Stomal Appliance Assessment Clean;Dry;Intact 12/16/20 1026   Stoma Assessment Red 12/16/20 1026   Stoma Shape Budded Less  "Than One Inch 12/16/20 1026   Stoma Size (in) 1 12/16/20 1026   Peristomal Assessment Clean;Dry;Intact 12/16/20 1026   Mucocutaneous Junction Intact 12/16/20 1026   Treatment Appliance Changed;Bag Change;Cleansed with water/washcloth;Site care 12/16/20 1026   Peristomal Protectant No Sting Skin Prep;Paste Ring 12/16/20 1026   Stomal Appliance Paste Ring, 2\";2 3/4\" (70mm) [facility] 12/16/20 1026   Output (mL) 0 mL 12/15/20 2256   Output Color Brown 12/16/20 1026   WOUND RN ONLY - Stomal Appliance  2 Piece;Paste Ring, 2\";2 3/4\" (70mm) [facility] 12/16/20 1026   Appliance (Pouch) # [id number] 12/11/20 1130   Appliance Brand Daisy 12/14/20 2032   Appliance Supplier [employer] 12/14/20 1600   Secure Start completed Yes 12/11/20 1130   Ostomy Care Resources Provided [facility] Tip Sheet 12/11/20 1130   WOUND NURSE ONLY - Time Spent with Patient (mins) 30 12/16/20 1026         Ostomy Appliance (type and size): 2 3/4\" 2 pc w/ VT, can do with 2 1/4'' 2 pc with VT.     Interventions: Previous appliance removed, cleansed skin with warm wash cloth.  barrier cut and paste ring applied, applied to skin.  Pouch closed and attached.      Pt education: Questions and concerns addressed    Evaluation: Stoma viable, producing gas and stool. Pt requiring staff assistance to perform ostomy care. Likely discharging to SNF.     Plan: Ostomy nurses to continue to follow for ostomy needs and teaching     Anticipated discharge needs: Supplies, supplier information, possible HH or outpatient ostomy clinic   "

## 2020-12-16 NOTE — CARE PLAN
Problem: Safety  Goal: Will remain free from injury  Outcome: PROGRESSING AS EXPECTED  Goal: Will remain free from falls  Outcome: PROGRESSING AS EXPECTED     Problem: Bowel/Gastric:  Goal: Normal bowel function is maintained or improved  Outcome: PROGRESSING AS EXPECTED  Goal: Will not experience complications related to bowel motility  Outcome: PROGRESSING AS EXPECTED     Problem: Discharge Barriers/Planning  Goal: Patient's continuum of care needs will be met  Outcome: PROGRESSING AS EXPECTED     Problem: Pain Management  Goal: Pain level will decrease to patient's comfort goal  Outcome: PROGRESSING AS EXPECTED     Problem: Psychosocial Needs:  Goal: Level of anxiety will decrease  Outcome: PROGRESSING AS EXPECTED     Problem: Mobility  Goal: Risk for activity intolerance will decrease  Outcome: PROGRESSING AS EXPECTED     Problem: Skin Integrity  Goal: Risk for impaired skin integrity will decrease  Outcome: PROGRESSING AS EXPECTED

## 2020-12-16 NOTE — THERAPY
"Physical Therapy   Daily Treatment     Patient Name: Sebastián Castro  Age:  55 y.o., Sex:  male  Medical Record #: [id number]  Today's Date: 12/15/2020     Precautions: Fall Risk    Assessment    Today, pt is limited with endurance for ambulation by hypotension. Nsg in to discuss. PT will continue to follow, see details below.     Plan    Continue current treatment plan.    DC Equipment Recommendations: Unable to determine at this time  Discharge Recommendations: Recommend post-acute placement for additional physical therapy services prior to discharge home       Objective       12/15/20 1137   Gait Analysis   Gait Level Of Assist Minimal Assist   Assistive Device Front Wheel Walker   Distance (Feet) 45   Comments during ambulation, pt reports \"seeing spots\", able to walk back to bs chair, but rushed. Pt up in chair initially, but reports still seeing spots, so assisted back to bed.    Bed Mobility    Supine to Sit Supervised   Sit to Supine Supervised   Scooting Supervised   Rolling Supervised   Functional Mobility   Sit to Stand Moderate Assist   Bed, Chair, Wheelchair Transfer Moderate Assist   Short Term Goals    Short Term Goal # 1 Pt to move supine to/from eob, no rails, w/ spv in 6 visits to improve fxl indep   Goal Outcome # 1 Goal met   Short Term Goal # 2 Pt to move sit to/from stand w/ spv in 6 visits w/ spv in 6 visits to improve fxl indep   Goal Outcome # 2 Goal not met   Short Term Goal # 3 Pt to ambulate 150 ft w/ spv in 6 visits to improve fxl indep   Goal Outcome # 3 Goal not met   Anticipated Discharge Equipment and Recommendations   DC Equipment Recommendations Unable to determine at this time   Discharge Recommendations Recommend post-acute placement for additional physical therapy services prior to discharge home         "

## 2020-12-16 NOTE — DISCHARGE PLANNING
Agency/Facility Name: [facility]  Outcome: Left vmail for Courtney. Requested call back.     @1006  Per Zenia with fundamentals she will follow up with Courtney at [facility] on referral.     @101  Agency/Facility Name: [facility]  Spoke To: Courtney  Outcome: pt declined, facility does not have a wound care nurse currently.

## 2020-12-16 NOTE — PROGRESS NOTES
Hospital Medicine Daily Progress Note    Date of Service  12/16/2020    Chief Complaint  55 y.o. male admitted 11/3/2020 with perforated colon.    Hospital Course  Admitted as a transfer, was noted to have splenic fluid collection.  Surgery was consulted on the case, CT-guided drain was done.  Further work-up revealed that he had a perforated colon.  Patient underwent segmental colectomy, colostomy creation, mobilization of splenic flexure, wound VAC placement, irrigation debridement of flank wound on 11/10/2020.  His culture showed Enterococcus and Candida.  His blood culture in 1 bottle showed Streptococcus viridans. Infectious disease was consulted on the case.  He was placed on antibiotics the form of IV Vancomycin, Rocephin, Diflucan. On  admission he also had acute kidney injury and hyperkalemia, Nephrology was consulted on the case and these have resolved. He has known history of congestive heart failure and cardiomyopathy, chronic atrial fibrillation, had episodes of rapid ventricular rate and Cardiology was consulted on the case.  He also had an episode of suicidal ideation and he was placed on legal hold.  Psychiatry was consulted on the case, he has been taken off legal hold at this point.  Physical therapy and Occupational Therapy came to evaluate him and they recommend continued rehabitation skilled nursing facility. He vontinues to require wound care with the wound VAC still in place. The drain at the left upper quadrant area was removed on 12/15/2020.    Interval Problem Update  Perforated colon-no pain, ostomy working without issue.     No bleeding issues.     Consultants/Specialty  Surgery  ID  Nephrology  Cardiology  Psychiatry    Code Status  Full Code    Disposition  Legal hold discontinued  Medically cleared   SNF - awaiting bed availability    Review of Systems  Review of Systems   Constitutional: Negative for fever and malaise/fatigue.   HENT: Negative for congestion, hearing loss and sore  throat.    Eyes: Negative for blurred vision.   Respiratory: Negative for shortness of breath.    Cardiovascular: Negative for chest pain.   Gastrointestinal: Negative for abdominal pain, diarrhea and vomiting.   Genitourinary: Negative for dysuria.   Musculoskeletal: Negative for myalgias.   Skin: Negative for rash.   Neurological: Positive for weakness (especially both legs). Negative for dizziness and headaches.   Psychiatric/Behavioral: Negative for suicidal ideas. The patient is not nervous/anxious.    All other systems reviewed and are negative.       Physical Exam  Temp:  [36.1 °C (97 °F)-36.7 °C (98 °F)] 36.2 °C (97.1 °F)  Pulse:  [] 54  Resp:  [17-18] 17  BP: ()/(58-74) 113/66  SpO2:  [93 %-98 %] 98 %    Physical Exam  Vitals signs and nursing note reviewed.   Constitutional:       Appearance: He is obese.   HENT:      Head: Normocephalic and atraumatic.      Nose: No congestion.      Mouth/Throat:      Mouth: Mucous membranes are moist.   Eyes:      General:         Right eye: No discharge.         Left eye: No discharge.      Extraocular Movements: Extraocular movements intact.      Conjunctiva/sclera: Conjunctivae normal.      Pupils: Pupils are equal, round, and reactive to light.   Neck:      Musculoskeletal: No muscular tenderness.   Cardiovascular:      Rate and Rhythm: Normal rate. Rhythm irregularly irregular.   Pulmonary:      Effort: Pulmonary effort is normal.      Breath sounds: Normal breath sounds.   Abdominal:      General: There is no distension.      Tenderness: There is no abdominal tenderness.      Comments: Wound vac at midline  Ostomy       Musculoskeletal:      Right lower leg: Edema present.      Left lower leg: Edema present.   Lymphadenopathy:      Cervical: No cervical adenopathy.   Skin:     General: Skin is warm and dry.   Neurological:      General: No focal deficit present.      Mental Status: He is alert and oriented to person, place, and time.      Cranial  Nerves: No cranial nerve deficit.   Psychiatric:         Thought Content: Thought content does not include suicidal ideation. Thought content does not include suicidal plan.         Fluids    Intake/Output Summary (Last 24 hours) at 12/16/2020 1549  Last data filed at 12/16/2020 1155  Gross per 24 hour   Intake 600 ml   Output 1950 ml   Net -1350 ml       Laboratory  Recent Labs     12/14/20  0623 12/15/20  0535   WBC 10.3 9.1   RBC 4.60* 4.40*   HEMOGLOBIN 11.9* 11.5*   HEMATOCRIT 39.9* 37.9*   MCV 86.7 86.1   MCH 25.9* 26.1*   MCHC 29.8* 30.3*   RDW 59.7* 58.5*   PLATELETCT 384 344   MPV 9.6 9.7     Recent Labs     12/14/20  0623 12/15/20  0535 12/16/20  0829   SODIUM 131* 131* 131*   POTASSIUM 4.1 4.5 4.5   CHLORIDE 93* 96 94*   CO2 27 25 25   GLUCOSE 141* 130* 141*   BUN 21 24* 29*   CREATININE 0.95 0.90 0.97   CALCIUM 9.8 9.7 9.9                   Imaging  No new imaging in the last 24 hours     Assessment/Plan  * Spontaneous perforation of colon (HCC)- (present on admission)  Assessment & Plan  Segmental colectomy, colostomy creation, mobilization of splenic flexure, wound VAC placement, irrigation and debridement of flank wound 11/10/2020  Finished course of IV Vancomycin, Rocephin, Diflucan   Wound care  Pain control  -ostomy working well  -incision site appears ok    Anxiety and depression- (present on admission)  Assessment & Plan  Risperdal, Trazodone    Hypothyroidism- (present on admission)  Assessment & Plan  Levothyroxine 112 mcg   Check TSH on 12/21/2020    Prolonged QT interval- (present on admission)  Assessment & Plan  Cardiac monitor  follow EKG    NICM (nonischemic cardiomyopathy) (HCC)- (present on admission)  Assessment & Plan  Toprol, Lisinopril, Lasix, Aldactone    Suicidal ideation  Assessment & Plan  Off Legal hold    Chronic systolic heart failure (HCC)- (present on admission)  Assessment & Plan  Toprol, Lisinopril, Aldactone, Lasix    Urinary retention- (present on admission)  Assessment  & Plan  Zabala placed  Start Flomax    Chronic atrial fibrillation- (present on admission)  Assessment & Plan  Amiodarone, Toprol  Was not on anticoagulation    Essential hypertension- (present on admission)  Assessment & Plan  Toprol-XL  Decreased Lisinopril and Aldactone    Type 2 diabetes mellitus with hyperglycemia, without long-term current use of insulin (HCC)- (present on admission)  Assessment & Plan  Lantus, SSI  -sugars are well controlled    Hypomagnesemia- (present on admission)  Assessment & Plan  IV Mg 2 g  Follow level    Hypokalemia- (present on admission)  Assessment & Plan  Follow bmp    Elevated digoxin level  Assessment & Plan  Hold digoxin       VTE prophylaxis: SCD

## 2020-12-17 VITALS
HEIGHT: 72 IN | DIASTOLIC BLOOD PRESSURE: 73 MMHG | TEMPERATURE: 98.4 F | HEART RATE: 53 BPM | SYSTOLIC BLOOD PRESSURE: 105 MMHG | OXYGEN SATURATION: 95 % | BODY MASS INDEX: 38.16 KG/M2 | RESPIRATION RATE: 20 BRPM | WEIGHT: 281.75 LBS

## 2020-12-17 PROBLEM — E87.6 HYPOKALEMIA: Status: RESOLVED | Noted: 2017-11-11 | Resolved: 2020-12-17

## 2020-12-17 PROBLEM — R45.851 SUICIDAL IDEATION: Status: RESOLVED | Noted: 2019-01-23 | Resolved: 2020-12-17

## 2020-12-17 PROBLEM — E83.42 HYPOMAGNESEMIA: Status: RESOLVED | Noted: 2017-11-15 | Resolved: 2020-12-17

## 2020-12-17 PROBLEM — K63.1 SPONTANEOUS PERFORATION OF COLON (HCC): Status: RESOLVED | Noted: 2020-11-10 | Resolved: 2020-12-17

## 2020-12-17 PROBLEM — R78.89 ELEVATED DIGOXIN LEVEL: Status: RESOLVED | Noted: 2020-11-17 | Resolved: 2020-12-17

## 2020-12-17 LAB
ANION GAP SERPL CALC-SCNC: 13 MMOL/L (ref 7–16)
BUN SERPL-MCNC: 35 MG/DL (ref 8–22)
CALCIUM SERPL-MCNC: 9.9 MG/DL (ref 8.5–10.5)
CHLORIDE SERPL-SCNC: 96 MMOL/L (ref 96–112)
CO2 SERPL-SCNC: 24 MMOL/L (ref 20–33)
CREAT SERPL-MCNC: 1.11 MG/DL (ref 0.5–1.4)
GLUCOSE BLD-MCNC: 136 MG/DL (ref 65–99)
GLUCOSE BLD-MCNC: 144 MG/DL (ref 65–99)
GLUCOSE BLD-MCNC: 150 MG/DL (ref 65–99)
GLUCOSE BLD-MCNC: 150 MG/DL (ref 65–99)
GLUCOSE BLD-MCNC: 181 MG/DL (ref 65–99)
GLUCOSE SERPL-MCNC: 126 MG/DL (ref 65–99)
POTASSIUM SERPL-SCNC: 4.5 MMOL/L (ref 3.6–5.5)
SODIUM SERPL-SCNC: 133 MMOL/L (ref 135–145)

## 2020-12-17 PROCEDURE — 700102 HCHG RX REV CODE 250 W/ 637 OVERRIDE(OP): Performed by: FAMILY MEDICINE

## 2020-12-17 PROCEDURE — 700102 HCHG RX REV CODE 250 W/ 637 OVERRIDE(OP): Performed by: STUDENT IN AN ORGANIZED HEALTH CARE EDUCATION/TRAINING PROGRAM

## 2020-12-17 PROCEDURE — 36415 COLL VENOUS BLD VENIPUNCTURE: CPT

## 2020-12-17 PROCEDURE — A9270 NON-COVERED ITEM OR SERVICE: HCPCS | Performed by: STUDENT IN AN ORGANIZED HEALTH CARE EDUCATION/TRAINING PROGRAM

## 2020-12-17 PROCEDURE — A9270 NON-COVERED ITEM OR SERVICE: HCPCS | Performed by: FAMILY MEDICINE

## 2020-12-17 PROCEDURE — 700111 HCHG RX REV CODE 636 W/ 250 OVERRIDE (IP): Performed by: INTERNAL MEDICINE

## 2020-12-17 PROCEDURE — 90837 PSYTX W PT 60 MINUTES: CPT | Performed by: PSYCHOLOGIST

## 2020-12-17 PROCEDURE — 99239 HOSP IP/OBS DSCHRG MGMT >30: CPT | Performed by: INTERNAL MEDICINE

## 2020-12-17 PROCEDURE — 80048 BASIC METABOLIC PNL TOTAL CA: CPT

## 2020-12-17 PROCEDURE — 82962 GLUCOSE BLOOD TEST: CPT | Mod: 91

## 2020-12-17 RX ORDER — SPIRONOLACTONE 25 MG/1
12.5 TABLET ORAL DAILY
Qty: 30 TAB | Refills: 3 | Status: ON HOLD | OUTPATIENT
Start: 2020-12-18 | End: 2021-02-05

## 2020-12-17 RX ORDER — LISINOPRIL 2.5 MG/1
2.5 TABLET ORAL DAILY
Qty: 30 TAB | Refills: 1 | Status: ON HOLD | OUTPATIENT
Start: 2020-12-18 | End: 2021-02-05

## 2020-12-17 RX ORDER — HYDROCODONE BITARTRATE AND ACETAMINOPHEN 5; 325 MG/1; MG/1
1 TABLET ORAL EVERY 6 HOURS PRN
Qty: 12 TAB | Refills: 0 | Status: SHIPPED | OUTPATIENT
Start: 2020-12-17 | End: 2020-12-20

## 2020-12-17 RX ORDER — TAMSULOSIN HYDROCHLORIDE 0.4 MG/1
0.4 CAPSULE ORAL
Qty: 30 CAP | Refills: 1 | Status: ON HOLD
Start: 2020-12-18 | End: 2021-02-05

## 2020-12-17 RX ORDER — METOPROLOL SUCCINATE 50 MG/1
50 TABLET, EXTENDED RELEASE ORAL 2 TIMES DAILY
Qty: 30 TAB | Refills: 1 | Status: ON HOLD
Start: 2020-12-17 | End: 2021-02-05

## 2020-12-17 RX ADMIN — HEPARIN SODIUM 5000 UNITS: 5000 INJECTION, SOLUTION INTRAVENOUS; SUBCUTANEOUS at 14:40

## 2020-12-17 RX ADMIN — RISPERIDONE 0.5 MG: 1 TABLET ORAL at 05:19

## 2020-12-17 RX ADMIN — FUROSEMIDE 20 MG: 20 TABLET ORAL at 15:13

## 2020-12-17 RX ADMIN — METOPROLOL SUCCINATE 50 MG: 50 TABLET, EXTENDED RELEASE ORAL at 05:20

## 2020-12-17 RX ADMIN — MODAFINIL 100 MG: 100 TABLET ORAL at 05:20

## 2020-12-17 RX ADMIN — LEVOTHYROXINE SODIUM 112 MCG: 112 TABLET ORAL at 05:21

## 2020-12-17 RX ADMIN — HEPARIN SODIUM 5000 UNITS: 5000 INJECTION, SOLUTION INTRAVENOUS; SUBCUTANEOUS at 05:19

## 2020-12-17 RX ADMIN — METOPROLOL SUCCINATE 50 MG: 50 TABLET, EXTENDED RELEASE ORAL at 17:07

## 2020-12-17 RX ADMIN — TAMSULOSIN HYDROCHLORIDE 0.4 MG: 0.4 CAPSULE ORAL at 09:42

## 2020-12-17 RX ADMIN — CETIRIZINE HYDROCHLORIDE 10 MG: 10 TABLET, FILM COATED ORAL at 05:20

## 2020-12-17 RX ADMIN — AMIODARONE HYDROCHLORIDE 200 MG: 200 TABLET ORAL at 05:20

## 2020-12-17 RX ADMIN — RISPERIDONE 1 MG: 1 TABLET ORAL at 17:07

## 2020-12-17 RX ADMIN — INSULIN GLARGINE 15 UNITS: 100 INJECTION, SOLUTION SUBCUTANEOUS at 06:28

## 2020-12-17 ASSESSMENT — FIBROSIS 4 INDEX: FIB4 SCORE: 1

## 2020-12-17 ASSESSMENT — PAIN DESCRIPTION - PAIN TYPE
TYPE: ACUTE PAIN
TYPE: ACUTE PAIN

## 2020-12-17 NOTE — DISCHARGE PLANNING
Agency[facility]  Referral sent per Choice form @ 9974     [facility] Name[facility]  Referral sent per Choice form @ 5623

## 2020-12-17 NOTE — DISCHARGE PLANNING
Received Transport Form @ 5440  Spoke to Therese @ [facility]    Transport is scheduled for 12/17 @ 2000 going to [facility].

## 2020-12-17 NOTE — DISCHARGE PLANNING
Agency/Facility Name: [facility]  Spoke To: Sathish  Outcome: Facility notified this CCA that facility will not be able to take new admissions until December 30th.   JASMEET German notified

## 2020-12-17 NOTE — DISCHARGE PLANNING
Anticipated Discharge Disposition: SNF VS [facility] ([facility])    Action: LSW received a call from ELISA Bryson CM with [facility] [phone]. Per [place], this patient is approved for [facility].    LSW spoke with the patient regarding [facility]. The patient stated he agrees with SNF and [facility], whichever facility has the be available first, he signed the [facility] choice form which was provided to [facility] to resubmit the referral to [facility].    LSW spoke with Cammie, Liaison with [facility]. Per Cammie, based on chart review, this patient meets criteria; however, the bed is not available today.    Barriers to Discharge: [facility] or SNF Acceptance and Bed.    Plan: As Above. Case Management to continue monitoring bed at [facility] and SNF.    3:40pm - Geneva Bonds with [facility] informed LSW this patient is accepted and the bed is available today. Per Cammie, [facility] can accept this patient today at 8:00pm, Accepting is Dr. Falk. LSW provided the [facility] and Transportation form to [facility]. Per [facility], [facility] confirmed  time at 8:00pm, LSW notified MD via Transit App and ELISA Cosme via Transit App. LSW met with the patient at bedside and explained the transfer arrangements, patient agreed, [facility] Form signed. Cammie will access the D/C Summary via "[employer]", ELISA Cosme will print the D/C Summary and place it in the Transfer Packet.

## 2020-12-17 NOTE — CARE PLAN
Problem: Safety  Goal: Will remain free from injury  Outcome: PROGRESSING AS EXPECTED  Goal: Will remain free from falls  Outcome: PROGRESSING AS EXPECTED     Problem: Infection  Goal: Will remain free from infection  Outcome: PROGRESSING AS EXPECTED     Problem: Venous Thromboembolism (VTW)/Deep Vein Thrombosis (DVT) Prevention:  Goal: Patient will participate in Venous Thrombosis (VTE)/Deep Vein Thrombosis (DVT)Prevention Measures  Outcome: PROGRESSING AS EXPECTED     Problem: Bowel/Gastric:  Goal: Normal bowel function is maintained or improved  Outcome: PROGRESSING AS EXPECTED  Goal: Will not experience complications related to bowel motility  Outcome: PROGRESSING AS EXPECTED     Problem: Discharge Barriers/Planning  Goal: Patient's continuum of care needs will be met  Outcome: PROGRESSING AS EXPECTED     Problem: Fluid Volume:  Goal: Will maintain balanced intake and output  Outcome: PROGRESSING AS EXPECTED     Problem: Pain Management  Goal: Pain level will decrease to patient's comfort goal  Outcome: PROGRESSING AS EXPECTED     Problem: Respiratory:  Goal: Respiratory status will improve  Outcome: PROGRESSING AS EXPECTED     Problem: Psychosocial Needs:  Goal: Level of anxiety will decrease  Outcome: PROGRESSING AS EXPECTED     Problem: Mobility  Goal: Risk for activity intolerance will decrease  Outcome: PROGRESSING AS EXPECTED     Problem: Skin Integrity  Goal: Risk for impaired skin integrity will decrease  Outcome: PROGRESSING AS EXPECTED

## 2020-12-17 NOTE — CARE PLAN
Problem: Safety  Goal: Will remain free from injury  Outcome: PROGRESSING AS EXPECTED  Goal: Will remain free from falls  Outcome: PROGRESSING AS EXPECTED     Problem: Venous Thromboembolism (VTW)/Deep Vein Thrombosis (DVT) Prevention:  Goal: Patient will participate in Venous Thrombosis (VTE)/Deep Vein Thrombosis (DVT)Prevention Measures  Outcome: PROGRESSING AS EXPECTED     Problem: Bowel/Gastric:  Goal: Normal bowel function is maintained or improved  Outcome: PROGRESSING AS EXPECTED  Goal: Will not experience complications related to bowel motility  Outcome: PROGRESSING AS EXPECTED     Problem: Pain Management  Goal: Pain level will decrease to patient's comfort goal  Outcome: PROGRESSING AS EXPECTED

## 2020-12-17 NOTE — PROGRESS NOTES
Assessment complete.  A&O x 4. Patient calls appropriately.  Patient ambulates with x1 assist. Bed alarm refused.   Patient has 2/10 pain. Pain managed with prescribed medications.  Denies N&V. Tolerating reg diet.  MLI WV in place, no leaks.  + void via frye, + flatus, + BM via ostomy.  Patient denies SOB.  SCD's off.    Review plan with of care with patient. Call light and personal belongings with in reach. Hourly rounding in place. All needs met at this time.

## 2020-12-18 NOTE — PROGRESS NOTES
All belongings with patient, wound vac disconnected. Patient transferred to [facility] via [facility], report given to EMTs.

## 2020-12-18 NOTE — DISCHARGE INSTRUCTIONS
Discharge Instructions    Discharged to other by ambulance with escort. Discharged via ambulance, hospital escort: Yes.  Special equipment needed: Not Applicable    Be sure to schedule a follow-up appointment with your primary care doctor or any specialists as instructed.     Discharge Plan:   Diet Plan: Discussed  Activity Level: Discussed  Confirmed Follow up Appointment: Patient to Call and Schedule Appointment  Confirmed Symptoms Management: Discussed  Medication Reconciliation Updated: Yes  Influenza Vaccine Indication: Not indicated: Previously immunized this influenza season and > 8 years of age  Influenza Vaccine Given - only chart on this line when given: Influenza Vaccine Given (See MAR)    I understand that a diet low in cholesterol, fat, and sodium is recommended for good health. Unless I have been given specific instructions below for another diet, I accept this instruction as my diet prescription.   Other diet: diabetic      Special Instructions: None    · Is patient discharged on Warfarin / Coumadin?   No     Open Colectomy, Care After  This sheet gives you information about how to care for yourself after your procedure. Your health care provider may also give you more specific instructions. If you have problems or questions, contact your health care provider.  What can I expect after the procedure?  After the procedure, it is common to have:  · Pain in your abdomen, especially along your incision.  · Tiredness. Your energy level will return to normal over the next several weeks.  · Constipation.  · Nausea.  · Difficulty urinating.  Follow these instructions at home:  Activity  · You may be able to return to most of your normal activities within 1-2 weeks, such as working, walking up stairs, and sexual activity.  · Avoid activities that require a lot of energy for 4-6 weeks after surgery, such as running, climbing, and lifting heavy objects. Ask your health care provider what activities are safe for  you.  · Take rest breaks during the day as needed.  · Do not drive for 1-2 weeks or until your health care provider says that it is safe.  · Do not drive or use heavy machinery while taking prescription pain medicines.  · Do not lift anything that is heavier than 10 lb (4.3 kg) until your health care provider says that it is safe.  Incision care    · Follow instructions from your health care provider about how to take care of your incision. Make sure you:  ? Wash your hands with soap and water before you change your bandage (dressing). If soap and water are not available, use hand .  ? Change your dressing as told by your health care provider.  ? Leave stitches (sutures) or staples in place. These skin closures may need to stay in place for 2 weeks or longer.  · Avoid wearing tight clothing around your incision.  · Protect your incision area from the sun.  · Check your incision area every day for signs of infection. Check for:  ? More redness, swelling, or pain.  ? More fluid or blood.  ? Warmth.  ? Pus or a bad smell.  General instructions  · Do not take baths, swim, or use a hot tub until your health care provider approves. Ask your health care provider when you may shower.  · Take over-the-counter and prescription medicines, including stool softeners, only as told by your health care provider.  · Eat a low-fat and low-fiber diet for the first 4 weeks after surgery.  · Keep all follow-up visits as told by your health care provider. This is important.  Contact a health care provider if:  · You have more redness, swelling, or pain around your incision.  · You have more fluid or blood coming from your incision.  · Your incision feels warm to the touch.  · You have pus or a bad smell coming from your incision.  · You have a fever or chills.  · You do not have a bowel movement 2-3 days after surgery.  · You cannot eat or drink for 24 hours or more.  · You have persistent nausea and vomiting.  · You have  abdominal pain that gets worse and does not get better with medicine.  Get help right away if:  · You have chest pain.  · You have shortness of breath.  · You have pain or swelling in your legs.  · Your incision breaks open after your sutures or staples have been removed.  · You have bleeding from the rectum.  This information is not intended to replace advice given to you by your health care provider. Make sure you discuss any questions you have with your health care provider.  Document Released: 07/10/2012 Document Revised: 11/30/2018 Document Reviewed: 09/18/2017  [employer] Patient Education © 2020 [employer]      Colostomy Home Guide, Adult    Colostomy surgery is done to create an opening in the front of the abdomen for stool (feces) to leave the body through an ostomy (stoma). Part of the large intestine is attached to the stoma. A bag, also called a pouch, is fitted over the stoma. Stool and gas will collect in the bag.  After surgery, you will need to empty and change your colostomy bag as needed. You will also need to care for your stoma.  How to care for the stoma  Your stoma should look pink, red, and moist, like the inside of your cheek. Soon after surgery, the stoma may be swollen, but this swelling will go away within 6 weeks. To care for the stoma:  · Keep the skin around the stoma clean and dry.  · Use a clean, soft washcloth to gently wash the stoma and the skin around it. Clean using a circular motion, and wipe away from the stoma opening, not toward it.  ? Use warm water and only use cleansers recommended by your health care provider.  ? Rinse the stoma area with plain water.  ? Dry the area around the stoma well.  · Use stoma powder or ointment on your skin only as told by your health care provider. Do not use any other powders, gels, wipes, or creams on the skin around the stoma.  · Check the stoma area every day for signs of infection. Check for:  ? New or worsening redness, swelling, or  pain.  ? New or increased fluid or blood.  ? Pus or warmth.  · Measure the stoma opening regularly and record the size. Watch for changes. (It is normal for the stoma to get smaller as swelling goes away.) Share this information with your health care provider.  How to empty the colostomy bag    Empty your bag at bedtime and whenever it is one-third to one-half full. Do not let the bag get more than half-full with stool or gas. The bag could leak if it gets too full. Some colostomy bags have a built-in gas release valve that releases gas often throughout the day.  Follow these basic steps:  1. Wash your hands with soap and water.  2. Sit far back on the toilet seat.  3. Put several pieces of toilet paper into the toilet water. This will prevent splashing as you empty stool into the toilet.  4. Remove the clip or the hook-and-loop fastener from the tail end of the bag.  5. Unroll the tail, then empty the stool into the toilet.  6. Clean the tail with toilet paper or a moist towelette.  7. Reroll the tail, and close it with the clip or the hook-and-loop fastener.  8. Wash your hands again.  How to change the colostomy bag  Change your bag every 3-4 days or as often as told by your health care provider. Also change the bag if it is leaking or  from the skin, or if your skin around the stoma looks or feels irritated. Irritated skin may be a sign that the bag is leaking.  Always have colostomy supplies with you, and follow these basic steps:  1. Wash your hands with soap and water. Have paper towels or tissues nearby to clean any discharge.  2. Remove the old bag and skin barrier. Use your fingers or a warm cloth to gently push the skin away from the barrier.  3. Clean the stoma area with water or with mild soap and water, as directed. Use water to rinse away any soap.  4. Dry the skin. You may use the cool setting on a hair dryer to do this.  5. Use a tracing pattern (template) to cut the skin barrier to the  size needed.  6. If you are using a two-piece bag, attach the bag and the skin barrier to each other. Add the barrier ring, if you use one.  7. If directed, apply stoma powder or skin barrier gel to the skin.  8. Warm the skin barrier with your hands, or blow with a hair dryer for 5-10 seconds.  9. Remove the paper from the adhesive strip of the skin barrier.  10. Press the adhesive strip onto the skin around the stoma.  11. Gently rub the skin barrier onto the skin. This creates heat that helps the barrier to stick.  12. Apply stoma tape to the edges of the skin barrier, if desired.  13. Wash your hands again.  General recommendations  · Avoid wearing tight clothes or having anything press directly on your stoma or bag. Change your clothing whenever it is soiled or damp.  · You may shower or bathe with the bag on or off. Do not use harsh or oily soaps or lotions. Dry the skin and bag after bathing.  · Store all supplies in a cool, dry place. Do not leave supplies in extreme heat because some parts can melt or not stick as well.  · Whenever you leave home, take extra clothing and an extra skin barrier and bag with you.  · If your bag gets wet, you can dry it with a hair dryer on the cool setting.  · To prevent odor, you may put drops of ostomy deodorizer in the bag.  · If recommended by your health care provider, put ostomy lubricant inside the bag. This helps stool to slide out of the bag more easily and completely.  Contact a health care provider if:  · You have new or worsening redness, swelling, or pain around your stoma.  · You have new or increased fluid or blood coming from your stoma.  · Your stoma feels warm to the touch.  · You have pus coming from your stoma.  · Your stoma extends in or out farther than normal.  · You need to change your bag every day.  · You have a fever.  Get help right away if:  · Your stool is bloody.  · You have nausea or you vomit.  · You have trouble breathing.  Summary  · Measure  your stoma opening regularly and record the size. Watch for changes.  · Empty your bag at bedtime and whenever it is one-third to one-half full. Do not let the bag get more than half-full with stool or gas.  · Change your bag every 3-4 days or as often as told by your health care provider.  · Whenever you leave home, take extra clothing and an extra skin barrier and bag with you.  This information is not intended to replace advice given to you by your health care provider. Make sure you discuss any questions you have with your health care provider.  Document Released: 12/20/2004 Document Revised: 04/08/2020 Document Reviewed: 06/13/2018  [employer] Patient Education © 2020 [employer]  Vacuum-Assisted Closure Therapy  Vacuum-assisted closure (VAC) therapy uses a device that removes fluid and germs from wounds to help them heal. It is used on wounds that cannot be closed with stitches. They often heal slowly. Vacuum-assisted therapy helps the wound stay clean and healthy while the open wound slowly grows back together.  Vacuum-assisted closure therapy uses a bandage (dressing) that is made of foam. It is put inside the wound. Then, a drape is placed over the wound. This drape sticks to your skin to keep air out, and to protect the wound. A tube is hooked up to a small pump and is attached to the drape. The pump sucks out the fluid and germs. Vacuum-assisted closure therapy can also help reduce the bad smell that comes from the wound.  HOW DOES IT WORK?   The vacuum pump pulls fluid through the foam dressing. The dressing may wrinkle during this process. The fluid goes into the tube and away from the wound. The fluid then goes into a container. The fluid in the container must be replaced if it is full or at least once a week, even if the container is not full. The pulling from the pump helps to close the wound and bring better circulation to the wound area.  The foam dressing covers and protects the wound. It helps  your wound heal faster.   HOW DOES IT FEEL?   · You might feel a little pulling when the pump is on.  · You might also feel a mild vibrating sensation.    · You might feel some discomfort when the dressing is taken off.  CAN I MOVE AROUND WITH VACUUM-ASSISTED CLOSURE THERAPY?  Yes, it has a backup battery which is used when the machine is not plugged in, as long as the battery is working, you can move freely.  WHAT ARE SOME THINGS I MUST KNOW?  · Do not turn off the pump yourself, unless instructed to do so by your healthcare provider, such as for bathing.  · Do not take off the dressing yourself, unless instructed to do so by your caregiver.  · You can wash or shower with the dressing. However, do not take the pump into the shower. Make sure the wound dressing is protected and covered with plastic. The wound area must stay dry.  · Do not turn off the pump for more than 2 hours. If the pump is off for more than 2 hours, your nurse must change your dressing.  · Check frequently that the machine is on, that the machine indicates the therapy is on, and that all clamps are open.  THE ALARM IS SOUNDING! WHAT SHOULD I DO?   · Stay calm.  · Do not turn off the pump or do anything with the dressing.  · Call your clinic or caregiver right away if the alarm goes off and you cannot fix the problem. Some reasons the alarm might go off include:  ¨ The fluid collection container is full.  ¨ The battery is low.  ¨ The dressing has a leak.  · Explain to your caregiver what is happening. Follow the instructions you receive.  WHEN SHOULD I CALL FOR HELP?   · You have severe pain.  · You have difficulty breathing.  · You have bleeding that will not stop.  · Your wound smells bad.  · You have redness, swelling, or fluid leaking from your wound.  · Your alarm goes off and you do not know what to do.  · You have a fever.  · Your wound itches severely.  · Your dressing changes are often painful or bleeding often occurs.  · You have  diarrhea.  · You have a sore throat.  · You have a rash around the dressing or anywhere else on your body.  · You feel nauseous.  · You feel dizzy or weak.  · The VAC machine has been off for more than 2 hours.  HOW DO I GET READY TO GO HOME WITH A PUMP?   A trained caregiver will talk to you and answer your questions about your vacuum-assisted closure therapy before you go home. He or she will explain what to expect. A caregiver will come to your home to apply the pump and care for your wound.  The at-home caregiver will be available for questions and will come back for the scheduled dressing changes, usually every 48-72 hours (or more often for severely infected wounds). Your at-home caregiver will also come if you are having an unexpected problem. If you have questions or do not know what to do when you go home, talk to your healthcare provider.  This information is not intended to replace advice given to you by your health care provider. Make sure you discuss any questions you have with your health care provider.  Document Released: 11/30/2009 Document Revised: 08/20/2014 Document Reviewed: 09/22/2016  [employer] Interactive Patient Education © 2017 [employer]    Depression / Suicide Risk    As you are discharged from this [facility] facility, it is important to learn how to keep safe from harming yourself.    Recognize the warning signs:  · Abrupt changes in personality, positive or negative- including increase in energy   · Giving away possessions  · Change in eating patterns- significant weight changes-  positive or negative  · Change in sleeping patterns- unable to sleep or sleeping all the time   · Unwillingness or inability to communicate  · Depression  · Unusual sadness, discouragement and loneliness  · Talk of wanting to die  · Neglect of personal appearance   · Rebelliousness- reckless behavior  · Withdrawal from people/activities they love  · Confusion- inability to concentrate     If you or a  loved one observes any of these behaviors or has concerns about self-harm, here's what you can do:  · Talk about it- your feelings and reasons for harming yourself  · Remove any means that you might use to hurt yourself (examples: pills, rope, extension cords, firearm)  · Get professional help from the community (Mental Health, Substance Abuse, psychological counseling)  · Do not be alone:Call your Safe Contact- someone whom you trust who will be there for you.  · Call your local CRISIS HOTLINE 562-4499 or 139-646-4296  · Call your local Children's Mobile Crisis Response Team [place] (545) 656-8452 or www.WEEZEVENT  · Call the toll free National Suicide Prevention Hotlines   · National Suicide Prevention Lifeline 232-002-JPSI (3812)  · National Hope Line Network 800-SUICIDE (048-4228)

## 2021-02-01 ENCOUNTER — HOSPITAL ENCOUNTER (INPATIENT)
Facility: MEDICAL CENTER | Age: 56
LOS: 4 days | DRG: 880 | End: 2021-02-05
Attending: EMERGENCY MEDICINE | Admitting: HOSPITALIST
Payer: MEDICAID

## 2021-02-01 ENCOUNTER — APPOINTMENT (OUTPATIENT)
Dept: RADIOLOGY | Facility: MEDICAL CENTER | Age: 56
DRG: 880 | End: 2021-02-01
Attending: EMERGENCY MEDICINE
Payer: MEDICAID

## 2021-02-01 DIAGNOSIS — R45.851 SUICIDAL IDEATION: ICD-10-CM

## 2021-02-01 DIAGNOSIS — R53.81 DEBILITY: ICD-10-CM

## 2021-02-01 DIAGNOSIS — F32.A ANXIETY AND DEPRESSION: ICD-10-CM

## 2021-02-01 DIAGNOSIS — F41.9 ANXIETY AND DEPRESSION: ICD-10-CM

## 2021-02-01 DIAGNOSIS — R29.898 LEFT LEG WEAKNESS: ICD-10-CM

## 2021-02-01 DIAGNOSIS — E66.9 CLASS 2 OBESITY WITH BODY MASS INDEX (BMI) OF 39.0 TO 39.9 IN ADULT, UNSPECIFIED OBESITY TYPE, UNSPECIFIED WHETHER SERIOUS COMORBIDITY PRESENT: ICD-10-CM

## 2021-02-01 PROBLEM — S06.5XAA SUBDURAL HEMATOMA (HCC): Status: RESOLVED | Noted: 2021-02-01 | Resolved: 2021-02-01

## 2021-02-01 PROBLEM — S06.5XAA SUBDURAL HEMATOMA (HCC): Status: ACTIVE | Noted: 2021-02-01

## 2021-02-01 PROBLEM — Z93.3 COLOSTOMY PRESENT (HCC): Status: ACTIVE | Noted: 2021-02-01

## 2021-02-01 PROBLEM — R74.8 ELEVATED LIVER ENZYMES: Status: ACTIVE | Noted: 2021-02-01

## 2021-02-01 LAB
ALBUMIN SERPL BCP-MCNC: 3.7 G/DL (ref 3.2–4.9)
ALBUMIN/GLOB SERPL: 1.2 G/DL
ALP SERPL-CCNC: 111 U/L (ref 30–99)
ALT SERPL-CCNC: 78 U/L (ref 2–50)
AMPHET UR QL SCN: NEGATIVE
ANION GAP SERPL CALC-SCNC: 12 MMOL/L (ref 7–16)
AST SERPL-CCNC: 50 U/L (ref 12–45)
BARBITURATES UR QL SCN: NEGATIVE
BASOPHILS # BLD AUTO: 0.7 % (ref 0–1.8)
BASOPHILS # BLD: 0.06 K/UL (ref 0–0.12)
BENZODIAZ UR QL SCN: NEGATIVE
BILIRUB SERPL-MCNC: 0.4 MG/DL (ref 0.1–1.5)
BUN SERPL-MCNC: 19 MG/DL (ref 8–22)
BZE UR QL SCN: NEGATIVE
CALCIUM SERPL-MCNC: 9.3 MG/DL (ref 8.5–10.5)
CANNABINOIDS UR QL SCN: NEGATIVE
CHLORIDE SERPL-SCNC: 102 MMOL/L (ref 96–112)
CO2 SERPL-SCNC: 21 MMOL/L (ref 20–33)
CREAT SERPL-MCNC: 1.1 MG/DL (ref 0.5–1.4)
EOSINOPHIL # BLD AUTO: 0.33 K/UL (ref 0–0.51)
EOSINOPHIL NFR BLD: 3.8 % (ref 0–6.9)
ERYTHROCYTE [DISTWIDTH] IN BLOOD BY AUTOMATED COUNT: 49.3 FL (ref 35.9–50)
GLOBULIN SER CALC-MCNC: 3.2 G/DL (ref 1.9–3.5)
GLUCOSE SERPL-MCNC: 133 MG/DL (ref 65–99)
HCT VFR BLD AUTO: 39.7 % (ref 42–52)
HGB BLD-MCNC: 12.8 G/DL (ref 14–18)
IMM GRANULOCYTES # BLD AUTO: 0.05 K/UL (ref 0–0.11)
IMM GRANULOCYTES NFR BLD AUTO: 0.6 % (ref 0–0.9)
INR PPP: 1.01 (ref 0.87–1.13)
INR PPP: 1.09 (ref 0.87–1.13)
LYMPHOCYTES # BLD AUTO: 2.36 K/UL (ref 1–4.8)
LYMPHOCYTES NFR BLD: 27 % (ref 22–41)
MCH RBC QN AUTO: 29.1 PG (ref 27–33)
MCHC RBC AUTO-ENTMCNC: 32.2 G/DL (ref 33.7–35.3)
MCV RBC AUTO: 90.2 FL (ref 81.4–97.8)
METHADONE UR QL SCN: NEGATIVE
MONOCYTES # BLD AUTO: 0.77 K/UL (ref 0–0.85)
MONOCYTES NFR BLD AUTO: 8.8 % (ref 0–13.4)
NEUTROPHILS # BLD AUTO: 5.16 K/UL (ref 1.82–7.42)
NEUTROPHILS NFR BLD: 59.1 % (ref 44–72)
NRBC # BLD AUTO: 0 K/UL
NRBC BLD-RTO: 0 /100 WBC
NT-PROBNP SERPL IA-MCNC: 2804 PG/ML (ref 0–125)
OPIATES UR QL SCN: NEGATIVE
OXYCODONE UR QL SCN: NEGATIVE
PCP UR QL SCN: NEGATIVE
PLATELET # BLD AUTO: 221 K/UL (ref 164–446)
PMV BLD AUTO: 10.8 FL (ref 9–12.9)
POC BREATHALIZER: 0 PERCENT (ref 0–0.01)
POTASSIUM SERPL-SCNC: 4.1 MMOL/L (ref 3.6–5.5)
PROPOXYPH UR QL SCN: NEGATIVE
PROT SERPL-MCNC: 6.9 G/DL (ref 6–8.2)
PROTHROMBIN TIME: 13.6 SEC (ref 12–14.6)
PROTHROMBIN TIME: 14.4 SEC (ref 12–14.6)
RBC # BLD AUTO: 4.4 M/UL (ref 4.7–6.1)
SARS-COV-2 RNA RESP QL NAA+PROBE: NOTDETECTED
SODIUM SERPL-SCNC: 135 MMOL/L (ref 135–145)
SPECIMEN SOURCE: NORMAL
TSH SERPL DL<=0.005 MIU/L-ACNC: 4.75 UIU/ML (ref 0.38–5.33)
WBC # BLD AUTO: 8.7 K/UL (ref 4.8–10.8)

## 2021-02-01 PROCEDURE — 93971 EXTREMITY STUDY: CPT | Mod: LT

## 2021-02-01 PROCEDURE — 36415 COLL VENOUS BLD VENIPUNCTURE: CPT

## 2021-02-01 PROCEDURE — 84443 ASSAY THYROID STIM HORMONE: CPT

## 2021-02-01 PROCEDURE — A9270 NON-COVERED ITEM OR SERVICE: HCPCS | Performed by: HOSPITALIST

## 2021-02-01 PROCEDURE — 85025 COMPLETE CBC W/AUTO DIFF WBC: CPT

## 2021-02-01 PROCEDURE — 70498 CT ANGIOGRAPHY NECK: CPT

## 2021-02-01 PROCEDURE — U0005 INFEC AGEN DETEC AMPLI PROBE: HCPCS

## 2021-02-01 PROCEDURE — 302970 POC BREATHALIZER

## 2021-02-01 PROCEDURE — 70496 CT ANGIOGRAPHY HEAD: CPT

## 2021-02-01 PROCEDURE — U0003 INFECTIOUS AGENT DETECTION BY NUCLEIC ACID (DNA OR RNA); SEVERE ACUTE RESPIRATORY SYNDROME CORONAVIRUS 2 (SARS-COV-2) (CORONAVIRUS DISEASE [COVID-19]), AMPLIFIED PROBE TECHNIQUE, MAKING USE OF HIGH THROUGHPUT TECHNOLOGIES AS DESCRIBED BY CMS-2020-01-R: HCPCS

## 2021-02-01 PROCEDURE — 85610 PROTHROMBIN TIME: CPT

## 2021-02-01 PROCEDURE — 99223 1ST HOSP IP/OBS HIGH 75: CPT | Performed by: HOSPITALIST

## 2021-02-01 PROCEDURE — 99285 EMERGENCY DEPT VISIT HI MDM: CPT

## 2021-02-01 PROCEDURE — 80053 COMPREHEN METABOLIC PANEL: CPT

## 2021-02-01 PROCEDURE — 96372 THER/PROPH/DIAG INJ SC/IM: CPT

## 2021-02-01 PROCEDURE — 83880 ASSAY OF NATRIURETIC PEPTIDE: CPT

## 2021-02-01 PROCEDURE — 700111 HCHG RX REV CODE 636 W/ 250 OVERRIDE (IP): Performed by: HOSPITALIST

## 2021-02-01 PROCEDURE — 302970 POC BREATHALIZER: Performed by: EMERGENCY MEDICINE

## 2021-02-01 PROCEDURE — 700102 HCHG RX REV CODE 250 W/ 637 OVERRIDE(OP): Performed by: HOSPITALIST

## 2021-02-01 PROCEDURE — 302111 WAFER OST 2.25IN N IMG RD 2 PC (BARRIER): Performed by: EMERGENCY MEDICINE

## 2021-02-01 PROCEDURE — 80307 DRUG TEST PRSMV CHEM ANLYZR: CPT

## 2021-02-01 PROCEDURE — 700117 HCHG RX CONTRAST REV CODE 255: Performed by: EMERGENCY MEDICINE

## 2021-02-01 PROCEDURE — 90791 PSYCH DIAGNOSTIC EVALUATION: CPT

## 2021-02-01 PROCEDURE — 770001 HCHG ROOM/CARE - MED/SURG/GYN PRIV*

## 2021-02-01 RX ORDER — LORATADINE 10 MG/1
10 TABLET ORAL EVERY MORNING
Status: ON HOLD | COMMUNITY
End: 2021-02-05 | Stop reason: SDUPTHER

## 2021-02-01 RX ORDER — ONDANSETRON 2 MG/ML
4 INJECTION INTRAMUSCULAR; INTRAVENOUS EVERY 4 HOURS PRN
Status: DISCONTINUED | OUTPATIENT
Start: 2021-02-01 | End: 2021-02-01

## 2021-02-01 RX ORDER — ONDANSETRON 4 MG/1
4 TABLET, ORALLY DISINTEGRATING ORAL EVERY 4 HOURS PRN
Status: DISCONTINUED | OUTPATIENT
Start: 2021-02-01 | End: 2021-02-01

## 2021-02-01 RX ORDER — DILTIAZEM HYDROCHLORIDE 180 MG/1
360 CAPSULE, COATED, EXTENDED RELEASE ORAL EVERY MORNING
Status: DISCONTINUED | OUTPATIENT
Start: 2021-02-02 | End: 2021-02-05 | Stop reason: HOSPADM

## 2021-02-01 RX ORDER — DIGOXIN 125 MCG
125 TABLET ORAL DAILY
Status: ON HOLD | COMMUNITY
End: 2021-02-05

## 2021-02-01 RX ORDER — LEVOTHYROXINE SODIUM 112 UG/1
112 TABLET ORAL
Status: DISCONTINUED | OUTPATIENT
Start: 2021-02-02 | End: 2021-02-05 | Stop reason: HOSPADM

## 2021-02-01 RX ORDER — AMOXICILLIN 250 MG
2 CAPSULE ORAL 2 TIMES DAILY
Status: DISCONTINUED | OUTPATIENT
Start: 2021-02-01 | End: 2021-02-05 | Stop reason: HOSPADM

## 2021-02-01 RX ORDER — FLUDROCORTISONE ACETATE 0.1 MG/1
0.1 TABLET ORAL EVERY MORNING
Status: ON HOLD | COMMUNITY
End: 2021-02-05 | Stop reason: SDUPTHER

## 2021-02-01 RX ORDER — POLYETHYLENE GLYCOL 3350 17 G/17G
1 POWDER, FOR SOLUTION ORAL
Status: DISCONTINUED | OUTPATIENT
Start: 2021-02-01 | End: 2021-02-05 | Stop reason: HOSPADM

## 2021-02-01 RX ORDER — PROMETHAZINE HYDROCHLORIDE 25 MG/1
12.5-25 SUPPOSITORY RECTAL EVERY 4 HOURS PRN
Status: DISCONTINUED | OUTPATIENT
Start: 2021-02-01 | End: 2021-02-01

## 2021-02-01 RX ORDER — SENNOSIDES A AND B 8.6 MG/1
8.6 TABLET, FILM COATED ORAL
Status: ON HOLD | COMMUNITY
End: 2021-02-05

## 2021-02-01 RX ORDER — FLUDROCORTISONE ACETATE 0.1 MG/1
0.1 TABLET ORAL EVERY MORNING
Status: DISCONTINUED | OUTPATIENT
Start: 2021-02-02 | End: 2021-02-05 | Stop reason: HOSPADM

## 2021-02-01 RX ORDER — FUROSEMIDE 20 MG/1
20 TABLET ORAL
Status: DISCONTINUED | OUTPATIENT
Start: 2021-02-02 | End: 2021-02-05 | Stop reason: HOSPADM

## 2021-02-01 RX ORDER — ONDANSETRON 4 MG/1
4 TABLET, ORALLY DISINTEGRATING ORAL EVERY 6 HOURS PRN
Status: ON HOLD | COMMUNITY
End: 2021-02-05

## 2021-02-01 RX ORDER — MIDODRINE HYDROCHLORIDE 2.5 MG/1
2.5 TABLET ORAL
Status: ON HOLD | COMMUNITY
End: 2021-02-05

## 2021-02-01 RX ORDER — ASPIRIN 325 MG
325 TABLET ORAL DAILY
Status: DISCONTINUED | OUTPATIENT
Start: 2021-02-01 | End: 2021-02-05 | Stop reason: HOSPADM

## 2021-02-01 RX ORDER — HYDROXYZINE HYDROCHLORIDE 25 MG/1
25 TABLET, FILM COATED ORAL EVERY 6 HOURS PRN
Status: ON HOLD | COMMUNITY
End: 2021-02-05

## 2021-02-01 RX ORDER — TAMSULOSIN HYDROCHLORIDE 0.4 MG/1
0.4 CAPSULE ORAL
Status: DISCONTINUED | OUTPATIENT
Start: 2021-02-02 | End: 2021-02-05 | Stop reason: HOSPADM

## 2021-02-01 RX ORDER — PROMETHAZINE HYDROCHLORIDE 25 MG/1
12.5-25 TABLET ORAL EVERY 4 HOURS PRN
Status: DISCONTINUED | OUTPATIENT
Start: 2021-02-01 | End: 2021-02-01

## 2021-02-01 RX ORDER — PROCHLORPERAZINE EDISYLATE 5 MG/ML
5-10 INJECTION INTRAMUSCULAR; INTRAVENOUS EVERY 4 HOURS PRN
Status: DISCONTINUED | OUTPATIENT
Start: 2021-02-01 | End: 2021-02-01

## 2021-02-01 RX ORDER — ACETAMINOPHEN 325 MG/1
650 TABLET ORAL EVERY 6 HOURS PRN
Status: DISCONTINUED | OUTPATIENT
Start: 2021-02-01 | End: 2021-02-05 | Stop reason: HOSPADM

## 2021-02-01 RX ORDER — ATORVASTATIN CALCIUM 40 MG/1
40 TABLET, FILM COATED ORAL EVERY EVENING
Status: DISCONTINUED | OUTPATIENT
Start: 2021-02-01 | End: 2021-02-05 | Stop reason: HOSPADM

## 2021-02-01 RX ORDER — BISACODYL 10 MG
10 SUPPOSITORY, RECTAL RECTAL
Status: DISCONTINUED | OUTPATIENT
Start: 2021-02-01 | End: 2021-02-05 | Stop reason: HOSPADM

## 2021-02-01 RX ORDER — INSULIN GLARGINE 100 [IU]/ML
15 INJECTION, SOLUTION SUBCUTANEOUS EVERY MORNING
Status: DISCONTINUED | OUTPATIENT
Start: 2021-02-02 | End: 2021-02-05 | Stop reason: HOSPADM

## 2021-02-01 RX ORDER — RISPERIDONE 1 MG/1
2.5 TABLET ORAL
Status: ON HOLD | COMMUNITY
End: 2021-02-05

## 2021-02-01 RX ORDER — SERTRALINE HYDROCHLORIDE 25 MG/1
25 TABLET, FILM COATED ORAL DAILY
Status: DISCONTINUED | OUTPATIENT
Start: 2021-02-01 | End: 2021-02-01

## 2021-02-01 RX ORDER — DIGOXIN 125 MCG
125 TABLET ORAL DAILY
Status: DISCONTINUED | OUTPATIENT
Start: 2021-02-01 | End: 2021-02-05 | Stop reason: HOSPADM

## 2021-02-01 RX ORDER — LORATADINE 10 MG/1
10 TABLET ORAL EVERY MORNING
Status: DISCONTINUED | OUTPATIENT
Start: 2021-02-02 | End: 2021-02-05 | Stop reason: HOSPADM

## 2021-02-01 RX ADMIN — ENOXAPARIN SODIUM 40 MG: 40 INJECTION SUBCUTANEOUS at 17:49

## 2021-02-01 RX ADMIN — IOHEXOL 100 ML: 350 INJECTION, SOLUTION INTRAVENOUS at 17:05

## 2021-02-01 RX ADMIN — RISPERIDONE 2.5 MG: 0.5 TABLET ORAL at 20:39

## 2021-02-01 RX ADMIN — ACETAMINOPHEN 650 MG: 325 TABLET ORAL at 17:50

## 2021-02-01 RX ADMIN — SERTRALINE HYDROCHLORIDE 25 MG: 25 TABLET ORAL at 17:50

## 2021-02-01 RX ADMIN — DOCUSATE SODIUM 50 MG AND SENNOSIDES 8.6 MG 2 TABLET: 8.6; 5 TABLET, FILM COATED ORAL at 17:50

## 2021-02-01 RX ADMIN — ATORVASTATIN CALCIUM 40 MG: 40 TABLET, FILM COATED ORAL at 17:50

## 2021-02-01 RX ADMIN — ASPIRIN 325 MG: 325 TABLET, FILM COATED ORAL at 17:50

## 2021-02-01 RX ADMIN — DIGOXIN 125 MCG: 125 TABLET ORAL at 17:49

## 2021-02-01 ASSESSMENT — ENCOUNTER SYMPTOMS
DEPRESSION: 1
HALLUCINATIONS: 1
HEADACHES: 0
NAUSEA: 0
DIZZINESS: 1
DIARRHEA: 0
CHILLS: 0
SENSORY CHANGE: 1
SPEECH CHANGE: 0
SHORTNESS OF BREATH: 0
ABDOMINAL PAIN: 0
COUGH: 0
STRIDOR: 0
NERVOUS/ANXIOUS: 0
FEVER: 0
PALPITATIONS: 0
BACK PAIN: 0

## 2021-02-01 ASSESSMENT — FIBROSIS 4 INDEX: FIB4 SCORE: 1

## 2021-02-01 ASSESSMENT — PATIENT HEALTH QUESTIONNAIRE - PHQ9
9. THOUGHTS THAT YOU WOULD BE BETTER OFF DEAD, OR OF HURTING YOURSELF: SEVERAL DAYS
6. FEELING BAD ABOUT YOURSELF - OR THAT YOU ARE A FAILURE OR HAVE LET YOURSELF OR YOUR FAMILY DOWN: MORE THAN HALF THE DAYS
7. TROUBLE CONCENTRATING ON THINGS, SUCH AS READING THE NEWSPAPER OR WATCHING TELEVISION: SEVERAL DAYS
1. LITTLE INTEREST OR PLEASURE IN DOING THINGS: NEARLY EVERY DAY
SUM OF ALL RESPONSES TO PHQ9 QUESTIONS 1 AND 2: 6
8. MOVING OR SPEAKING SO SLOWLY THAT OTHER PEOPLE COULD HAVE NOTICED. OR THE OPPOSITE, BEING SO FIGETY OR RESTLESS THAT YOU HAVE BEEN MOVING AROUND A LOT MORE THAN USUAL: NOT AT ALL
3. TROUBLE FALLING OR STAYING ASLEEP OR SLEEPING TOO MUCH: SEVERAL DAYS
2. FEELING DOWN, DEPRESSED, IRRITABLE, OR HOPELESS: NEARLY EVERY DAY
5. POOR APPETITE OR OVEREATING: NOT AT ALL
SUM OF ALL RESPONSES TO PHQ QUESTIONS 1-9: 13
4. FEELING TIRED OR HAVING LITTLE ENERGY: MORE THAN HALF THE DAYS

## 2021-02-01 ASSESSMENT — COGNITIVE AND FUNCTIONAL STATUS - GENERAL
SUGGESTED CMS G CODE MODIFIER MOBILITY: CJ
CLIMB 3 TO 5 STEPS WITH RAILING: A LITTLE
DAILY ACTIVITIY SCORE: 22
WALKING IN HOSPITAL ROOM: A LITTLE
STANDING UP FROM CHAIR USING ARMS: A LITTLE
SUGGESTED CMS G CODE MODIFIER DAILY ACTIVITY: CJ
TOILETING: A LITTLE
HELP NEEDED FOR BATHING: A LITTLE
MOBILITY SCORE: 21

## 2021-02-01 ASSESSMENT — LIFESTYLE VARIABLES: DO YOU DRINK ALCOHOL: NO

## 2021-02-01 NOTE — ED PROVIDER NOTES
ED Provider Note    CHIEF COMPLAINT  Chief Complaint   Patient presents with   • Suicidal Ideation     reports having thoughts of killing himself over the past 2 days.        HPI  Sebastián Castro is a 55 y.o. male with history of CHF, diabetes, morbid obesity, and chronic wound who has a wound VAC, he is admitted at a cardiac rehab facility and they are planning his discharge.  Patient has longstanding history of schizophrenia and depression.  He reports he has been depressed to the point of feeling suicidal for the last few weeks.  He mentioned this to his care providers who sent him here for further evaluation.  Patient reports that he does not feel safe if discharged, he reports he can hardly ambulate on his own,.  He feels he needs ongoing care.  Patient denies any homicidal ideation.  He does report hearing voices which are nondescript.  Patient denies any new abdominal pain.  He does report that he has had some increased swelling in his left lower extremity.  He denies any significant upper extremity weakness.  Denies any associated changes in sensation.  Patient denies any associated headache.  Patient denies any new back pain.  Denies any saddle anesthesias or bowel bladder incontinence.    REVIEW OF SYSTEMS  ROS  See HPI for further details. All other systems are negative.     PAST MEDICAL HISTORY   has a past medical history of A-fib ([facility]), Abscess (11/8/2020), Bacteremia (11/8/2020), Chest pain, Congestive heart failure ([facility]), Diabetes ([facility]), Diabetic neuropathy ([facility]), Hypercholesterolemia, Hypertension, Longstanding persistent atrial fibrillation ([facility]) (11/8/2020), LV dysfunction (11/8/2020), Morbid obesity ([facility]), NICM (nonischemic cardiomyopathy) ([facility]) (11/8/2020), Noncompliance, Normal cardiac stress test, Orthostatic hypotension, and Sepsis ([facility]) (11/8/2020).    SOCIAL HISTORY  Social History     Tobacco Use   • Smoking status: Never Smoker   • Smokeless tobacco: Never Used   Substance and Sexual  Activity   • Alcohol use: No   • Drug use: No   • Sexual activity: Not on file       SURGICAL HISTORY   has a past surgical history that includes toe amputation (Right, 11/10/2017); toe amputation (Right, 1/17/2018); irrigation & debridement ortho (Right, 2/19/2018); zzz cardiac cath (07/21/2018); and colectomy (N/A, 11/10/2020).    CURRENT MEDICATIONS  Home Medications     Reviewed by Cyn Camacho R.N. (Registered Nurse) on 02/01/21 at 1318  Med List Status: Partial   Medication Last Dose Status   acetaminophen (TYLENOL) 325 MG Tab  Active   albuterol (PROVENTIL) 2.5mg/0.5ml Nebu Soln  Active   amiodarone (CORDARONE) 200 MG Tab  Active   cetirizine (ZYRTEC) 10 MG Tab  Active   famotidine (PEPCID) 20 MG Tab  Active   furosemide (LASIX) 20 MG Tab  Active   insulin glargine (LANTUS) 100 UNIT/ML Solution  Active   insulin regular (HUMULIN R) 100 Unit/mL Solution  Active   levothyroxine (SYNTHROID) 112 MCG Tab  Active   lisinopril (PRINIVIL) 2.5 MG Tab  Active   metoprolol SR (TOPROL XL) 50 MG TABLET SR 24 HR  Active   risperiDONE (RISPERDAL) 0.5 MG Tab  Active   risperiDONE (RISPERDAL) 1 MG Tab  Active   spironolactone (ALDACTONE) 25 MG Tab  Active   tamsulosin (FLOMAX) 0.4 MG capsule  Active   traZODone (DESYREL) 50 MG Tab  Active                ALLERGIES  Allergies   Allergen Reactions   • Daptomycin Rash     Body rash  RXN=1/2018     • Pcn [Penicillins] Hives and Rash      Rash & hives  RXN=since a kid  Tolerates cephalosporins   • Unasyn [Ampicillin-Sulbactam Sodium] Hives, Itching and Swelling   • Vancomycin Rash     Red man syndrome. Tolerates IV vancomycin at reduced infusion rate.       PHYSICAL EXAM  Vitals:    02/01/21 1315   BP: 138/80   Pulse: 81   Resp: 18   Temp: 36.7 °C (98.1 °F)   SpO2: 97%       Physical Exam   Constitutional: He is oriented to person, place, and time. He appears well-developed and well-nourished.   HENT:   Head: Normocephalic and atraumatic.   Eyes: Pupils are equal, round, and  reactive to light. Conjunctivae are normal.   Neck: Normal range of motion. Neck supple.   Cardiovascular: Normal rate and regular rhythm. Exam reveals no gallop and no friction rub.   No murmur heard.  Pulmonary/Chest: Effort normal and breath sounds normal. No respiratory distress. He has no wheezes.   Abdominal: Soft. Bowel sounds are normal. He exhibits no distension. There is no abdominal tenderness. There is no rebound.   Musculoskeletal:      Comments: Left lower extremity is mildly larger than right.   Neurological: He is alert and oriented to person, place, and time.   5-5 strength in bilateral lower extremities however occasionally patient will have 4-5 strength here, he is able to hold his leg up with good coaching.  Patient sensation is identical in bilateral lower extremities.  Pulses are 2+ bilaterally.  Tissues are warm well perfused.   Skin: Skin is warm and dry.   Psychiatric: He has a normal mood and affect. His behavior is normal.     Results for orders placed or performed during the hospital encounter of 02/01/21   Urine Drug Screen   Result Value Ref Range    Amphetamines Urine Negative Negative    Barbiturates Negative Negative    Benzodiazepines Negative Negative    Cocaine Metabolite Negative Negative    Methadone Negative Negative    Opiates Negative Negative    Oxycodone Negative Negative    Phencyclidine -Pcp Negative Negative    Propoxyphene Negative Negative    Cannabinoid Metab Negative Negative   CBC WITH DIFFERENTIAL   Result Value Ref Range    WBC 8.7 4.8 - 10.8 K/uL    RBC 4.40 (L) 4.70 - 6.10 M/uL    Hemoglobin 12.8 (L) 14.0 - 18.0 g/dL    Hematocrit 39.7 (L) 42.0 - 52.0 %    MCV 90.2 81.4 - 97.8 fL    MCH 29.1 27.0 - 33.0 pg    MCHC 32.2 (L) 33.7 - 35.3 g/dL    RDW 49.3 35.9 - 50.0 fL    Platelet Count 221 164 - 446 K/uL    MPV 10.8 9.0 - 12.9 fL    Neutrophils-Polys 59.10 44.00 - 72.00 %    Lymphocytes 27.00 22.00 - 41.00 %    Monocytes 8.80 0.00 - 13.40 %    Eosinophils 3.80  0.00 - 6.90 %    Basophils 0.70 0.00 - 1.80 %    Immature Granulocytes 0.60 0.00 - 0.90 %    Nucleated RBC 0.00 /100 WBC    Neutrophils (Absolute) 5.16 1.82 - 7.42 K/uL    Lymphs (Absolute) 2.36 1.00 - 4.80 K/uL    Monos (Absolute) 0.77 0.00 - 0.85 K/uL    Eos (Absolute) 0.33 0.00 - 0.51 K/uL    Baso (Absolute) 0.06 0.00 - 0.12 K/uL    Immature Granulocytes (abs) 0.05 0.00 - 0.11 K/uL    NRBC (Absolute) 0.00 K/uL   CMP   Result Value Ref Range    Sodium 135 135 - 145 mmol/L    Potassium 4.1 3.6 - 5.5 mmol/L    Chloride 102 96 - 112 mmol/L    Co2 21 20 - 33 mmol/L    Anion Gap 12.0 7.0 - 16.0    Glucose 133 (H) 65 - 99 mg/dL    Bun 19 8 - 22 mg/dL    Creatinine 1.10 0.50 - 1.40 mg/dL    Calcium 9.3 8.5 - 10.5 mg/dL    AST(SGOT) 50 (H) 12 - 45 U/L    ALT(SGPT) 78 (H) 2 - 50 U/L    Alkaline Phosphatase 111 (H) 30 - 99 U/L    Total Bilirubin 0.4 0.1 - 1.5 mg/dL    Albumin 3.7 3.2 - 4.9 g/dL    Total Protein 6.9 6.0 - 8.2 g/dL    Globulin 3.2 1.9 - 3.5 g/dL    A-G Ratio 1.2 g/dL   PT/INR   Result Value Ref Range    PT 13.6 12.0 - 14.6 sec    INR 1.01 0.87 - 1.13   ESTIMATED GFR   Result Value Ref Range    GFR If African American >60 >60 mL/min/1.73 m 2    GFR If Non African American >60 >60 mL/min/1.73 m 2   SARS-CoV-2 PCR (24 hour In-House): Collect NP swab in [facility] [place]    Specimen: Respirate   Result Value Ref Range    SARS-CoV-2 Source NP Swab    POC BREATHALIZER   Result Value Ref Range    POC Breathalizer 0.00 0.00 - 0.01 Percent     CT-CTA HEAD WITH & W/O-POST PROCESS   Final Result      CT angiogram of the [place] within normal limits.      CT-CTA NECK WITH & W/O-POST PROCESSING   Final Result      CT angiogram of the neck notable for some plaque but no significant stenosis, dissection or occlusion      Distal bilateral ICA tortuosity.      US-EXTREMITY VENOUS LOWER UNILAT LEFT   Final Result      MR-BRAIN-W/O    (Results Pending)         COURSE & MEDICAL DECISION MAKING  Pertinent Labs & Imaging studies  reviewed. (See chart for details)    Patient here with chronic weakness, multiple chronic illnesses and multiple health care needs now suicidal secondary to his situation.  He reports that even if sent back to [place] he would still be suicidal and therefore disposition is very difficult.  Patient will be placed on a hold and be admitted for placement.  I have CT scan patient's brain with a CTA head and neck to further evaluate for possible vascular abnormality as a cause of patient's left lower extremity weakness however the weakness does appear at least partially volitional given the inconsistencies of the exam.  Patient will likely need MRI tomorrow.  I also ultrasound given the diameter discrepancy.  Ultrasound is unremarkable.  Patient's basic labs are unremarkable.  I discussed the case with hospitalist who is agreed to admit and will work on patient's placement.     The patient will return for worsening symptoms and is stable at the time of discharge. The patient verbalizes understanding and will comply.    FINAL IMPRESSION  1.  Left lower extremity weakness, suicidal ideation, depression, chronic weakness         Electronically signed by: Addison Campbell M.D., 2/1/2021 2:20 PM

## 2021-02-01 NOTE — CONSULTS
"[facility]   TRIAGE ASSESSMENT    Name: Sebastián Castro  MRN: [id number]  : 1965  Age: 55 y.o.  Date of assessment: 2021  PCP: CRYSTAL Terrazas.  Persons in attendance: Patient    CHIEF COMPLAINT/PRESENTING ISSUE    Chief Complaint   Patient presents with   • Suicidal Ideation     reports having thoughts of killing himself over the past 2 days.       Per triage note, \"Pt OLESYA AMPARO from [facility] on L2K.  Pt reports being there for rehab after colostomy placement.  Pt informed RN MD was going to [place] pt home and he feels like self care is too much right now so he doesn't want to life.  Pt reports having plan of taking pills or slitting wrists to end his life.  Pt has previous SI attempt 1.5 years when he slit his wrists.  Pt states 'I just don't want to hurt myself or anyone else.' \"    Upon my consultation, pt a+ox4; denies HI.  Says he has been having SI x3-4 days d/t his poor health.  Says he was told he was supposed to be discharging from the SNF to a  tomorrow.  Says he fell at SNF and couldn't get himself back up.  Says that made him depressed and suicidal.  \"I'm just tired of living like this.  What if I fall at the group home and no one is there?\"  Cannot name any reasons to live.  Cannot contract for safety.    CURRENT LIVING SITUATION/SOCIAL SUPPORT: No permanent home.  Pt has been in and out of medical hospital several times in last few years; SNFs, group homes.  Step son named Freddie inge in past charting, [phone].  Brother who lives in [place].  Previously , worked at [employer] in the .  Reports his former wife and children have all passed. Lost job 3/2018 and couldn't get new one d/t wound on foot.  Some income from disability.     Of note, from chart review:  No past Alert Team consults.  2018:  23 ER visits, all for medicall, several of which turned into lengthy medical stays for complications of diabetes, CHF.  Hx of multiple FALLS, " "syncope, Afib.    Seen by our psychiatry team 2018 while on medical unit for CHF.  Confusion noted.  \"Pt seemed no longer to be having seizure-like episodes, but they started up again on . 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 He is having episodes that resolve quickly with no post-ictal state.   He has a history of sudden paraplegia that occurred and resolved spontaneously. The first time it happened it took one month to recover, and recovered in rehab. 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 It was not surgical. In July when this happened PT thought his behaviors were limiting his progression in PT, and were in agreement his paresis had some psychogenic component to it. They stated 'pt reports non use of his LEs, however if distracted and then given gross motor cue will perform mobility without issue.' He also had tremors that resolved when distracted.   He is quite stoic despite incredible stress. He has lost multiple members of his family, in cluding his wife several years ago. 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 He met his current s/o while living in the shelter. They are quite close and she is very supportive. He is very afraid of dying. He has reexperiencing symtpoms related to his arrhythmias and his ampuation. He has nightmares related to his wife's death, and he states he was there when she . He has nightmares where he wakes up panicking looking for his foot/leg, after nightmares it has been amputated completely. He had to retire and has had a very hard time dealing with that. He values working and used to enjoy his job.  His overall presentation is odd and has been inconsistent.  Suspicious for malingering, but his presentation is bizarre in that he is doing things that are relatively strange and ineffective.  He doesn't appear internally stimulated, sitter confirms he " "never appears to be internally stimulated. He states 'oh, well, it's only at night. It only happens at night time.' Have suspected he is attempting to stay inpatient due to homelessness, and he indicates when asked that he wouldn't kill himself in a group home. Discuss with him that he can't go to a group home on a legal hold or if they had any thoughts he might kill himself there. He reports that he will be fine, and that he won't kill himself.    Per Dr. Solares, whose assessment was Adjustment d/o w/depressed mood, PTSD and Functional Neurologic Symptom d/o. Reported AH.  Inappropriate affect noted, smiling when talking about upsetting topics. Started on prozac.  Fluctuating SI.  Dx adjusted to MDD severe w/o psychosis.  Denied previous SA or psych tx.   DC'd to a [facility] 2/26/18.    3/15/2018: In ER for medical; no psych meds noted in home med list.    2019: Once ER visit for afib; 7 days medical inpt.  Prozac and risperdal noted in home meds.    2020: 2 ER visits.  9/6/20: 4 days medical inpt for CHF, afib.  [facility] [place].    11/3-12/17/2020: 44 days on medical inpt unit for sepsis, perforated colon, SRUTHI, intraabdominal abscess.  C/o SI while on inpt medical unit.  Hx prolonged QTC noted.  Psychiatry noted \"put on zyprexa and zoloft in 2018; says he still takes them but doesn't feel they are effective.\"  Reports AH saying \"you're hopeless.\"  Per Dr Posey, \"His 'voices' are what he thinks of himself.\"  Living in a  for about a year.  No SAs.  [facility].      BEHAVIORAL HEALTH TREATMENT HISTORY  Does patient/parent report a history of prior behavioral health treatment for patient?   Yes.  Pt recently established with Dr Dill at [facility] for psychiatry.  No hx prior.    SAFETY ASSESSMENT - SELF  Does patient acknowledge current or past symptoms of dangerousness to self? Yes, past and current SI.  Does parent/significant other report patient has current or past symptoms of " dangerousness to self? N\A  Does presenting problem suggest symptoms of dangerousness to self? Yes:     Past Current    Suicidal Thoughts: [x]  [x]    Suicidal Plans: []  []    Suicidal Intent: []  []    Suicide Attempts: []  []    Self-Injury []  []      For any boxes checked above, provide detail: Pt has had past SI, no SA.  Current SI with vague plan to cut wrists.    Recent change in frequency/specificity/intensity of suicidal thoughts or self-harm behavior? yes - started 3-4 days ago  Current access to firearms, medications, or other identified means of suicide/self-harm? No; on LH in ER  Protective factors present:  Willing to address in treatment    SAFETY ASSESSMENT - OTHERS  Does patient acknowledge current or past symptoms of aggressive behavior or risk to others? no  Does parent/significant other report patient has current or past symptoms of aggressive behavior or risk to others?  N\A  Does presenting problem suggest symptoms of dangerousness to others? No    Crisis Safety Plan completed and copy given to patient? N\A    ABUSE/NEGLECT SCREENING  Does patient report feeling “unsafe” in his/her home, or afraid of anyone?  no  Does patient report any history of physical, sexual, or emotional abuse?  no  Does parent or significant other report any of the above? no  Is there evidence of neglect by self?  no  Is there evidence of neglect by a caregiver? no  Does the patient/parent report any history of CPS/APS/police involvement related to suspected abuse/neglect or domestic violence? no  Based on the information provided during the current assessment, is a mandated report of suspected abuse/neglect being made?  No    SUBSTANCE USE SCREENING  Yes:  Zachary all substances used in the past 30 days:  Pt reports sobriety from [facility] since 2016.    UDS results: pending  Breathalyzer results: 0.0      MENTAL STATUS   Participation: Active verbal participation, Attentive and Engaged  Grooming: Casual  Orientation: Alert  and Fully Oriented  Behavior: Calm  Eye contact: Good  Mood: Euthymic  Affect: Flexible, Full range and Incongruent with content  Thought process: Logical and Goal-directed  Thought content: Within normal limits  Speech: Rate within normal limits and Volume within normal limits  Perception: Within normal limits and pt reports AH; no signs of internal stim  Memory:  No gross evidence of memory deficits  Insight: Adequate  Judgment:  Limited  Other:    Collateral information: past EMR  Source:  [] Significant other present in person:   [] Significant other by telephone  [] [facility]   [] [facility] Nursing Staff  [x] [facility]       CLINICAL IMPRESSIONS:  Primary:  SI  Secondary:  Multiple medical problems       IDENTIFIED NEEDS/PLAN:  [Trigger DISPOSITION list for any items marked]    [x]  Imminent safety risk - self [] Imminent safety risk - others   []  Acute substance withdrawal []  Psychosis/Impaired reality testing   [x]  Mood/anxiety []  Substance use/Addictive behavior   [x]  Maladaptive behaviro []  Parent/child conflict   []  Family/Couples conflict []  Biomedical   [x]  Housing [x]  Financial   []   Legal  Occupational/Educational   []  Domestic violence []  Other:     Recommended Plan of Care:  Actively being addressed by [facility] and [facility] and 1:1 Observation   Pt scored HIGH on Major screening and requires 1:1 sitter.    Does patient express agreement with the above plan? yes    Referral appointment(s) scheduled? N\A    Alert team only: Pt to remain on LH for SI.    Pt being admitted to medical unit, so LH cannot be medically certified at this time.    I have discussed findings and recommendations with Dr. Campbell, who is in agreement with these recommendations.         Shantelle Roberts R.N.  2/1/2021

## 2021-02-01 NOTE — ED NOTES
"Assumed pt care. Agree with triage note. Pt describes no support or family here in [place] and states \"I dontt want to live anymore\".  Sitter at [facility] pt High [place] score.  "

## 2021-02-01 NOTE — ED NOTES
Pt reports no belongings as he was transferred from rehab. Ostomy supplies ordered pt states last changed 3 days ago.

## 2021-02-01 NOTE — ED NOTES
Med Rec completed per MAR from [facility]  Allergies reviewed  No ORAL antibiotics in last 14 days

## 2021-02-01 NOTE — ED TRIAGE NOTES
"Chief Complaint   Patient presents with   • Suicidal Ideation     reports having thoughts of killing himself over the past 2 days.      Pt OLESYA AMPARO from [facility] on L2K.  Pt reports being there for rehab after colostomy placement.  Pt informed RN MD was going to [place] pt home and he feels like self care is too much right now so he doesn't want to life.  Pt reports having plan of taking pills or slitting wrists to end his life.  Pt has previous SI attempt 1.5 years when he slit his wrists.  Pt states \"I just don't want to hurt myself or anyone else\".      /80   Pulse 81   Temp 36.7 °C (98.1 °F) (Temporal)   Resp 18   Ht 1.829 m (6')   Wt (!) 131 kg (289 lb)   SpO2 97%   BMI 39.20 kg/m²     "

## 2021-02-02 LAB
ALBUMIN SERPL BCP-MCNC: 3.6 G/DL (ref 3.2–4.9)
ALBUMIN/GLOB SERPL: 1.1 G/DL
ALP SERPL-CCNC: 106 U/L (ref 30–99)
ALT SERPL-CCNC: 77 U/L (ref 2–50)
ANION GAP SERPL CALC-SCNC: 12 MMOL/L (ref 7–16)
AST SERPL-CCNC: 50 U/L (ref 12–45)
BILIRUB SERPL-MCNC: 0.4 MG/DL (ref 0.1–1.5)
BUN SERPL-MCNC: 16 MG/DL (ref 8–22)
CALCIUM SERPL-MCNC: 9.3 MG/DL (ref 8.5–10.5)
CFT BLD TEG: 6.9 MIN (ref 5–10)
CHLORIDE SERPL-SCNC: 101 MMOL/L (ref 96–112)
CLOT ANGLE BLD TEG: 61.4 DEGREES (ref 53–72)
CLOT LYSIS 30M P MA LENFR BLD TEG: 0.2 % (ref 0–8)
CO2 SERPL-SCNC: 23 MMOL/L (ref 20–33)
CREAT SERPL-MCNC: 1.03 MG/DL (ref 0.5–1.4)
CT.EXTRINSIC BLD ROTEM: 2.1 MIN (ref 1–3)
DIGOXIN SERPL-MCNC: 1.2 NG/ML (ref 0.8–2)
GLOBULIN SER CALC-MCNC: 3.3 G/DL (ref 1.9–3.5)
GLUCOSE BLD-MCNC: 101 MG/DL (ref 65–99)
GLUCOSE SERPL-MCNC: 120 MG/DL (ref 65–99)
MAGNESIUM SERPL-MCNC: 1.8 MG/DL (ref 1.5–2.5)
MCF BLD TEG: 62.3 MM (ref 50–70)
PHOSPHATE SERPL-MCNC: 4.1 MG/DL (ref 2.5–4.5)
POTASSIUM SERPL-SCNC: 4.1 MMOL/L (ref 3.6–5.5)
PROT SERPL-MCNC: 6.9 G/DL (ref 6–8.2)
SODIUM SERPL-SCNC: 136 MMOL/L (ref 135–145)
TEG ALGORITHM TGALG: NORMAL

## 2021-02-02 PROCEDURE — 80162 ASSAY OF DIGOXIN TOTAL: CPT

## 2021-02-02 PROCEDURE — 83735 ASSAY OF MAGNESIUM: CPT

## 2021-02-02 PROCEDURE — 770001 HCHG ROOM/CARE - MED/SURG/GYN PRIV*

## 2021-02-02 PROCEDURE — A9270 NON-COVERED ITEM OR SERVICE: HCPCS | Performed by: PSYCHIATRY & NEUROLOGY

## 2021-02-02 PROCEDURE — 302111 WAFER OST 2.25IN N IMG RD 2 PC (BARRIER): Performed by: INTERNAL MEDICINE

## 2021-02-02 PROCEDURE — 700111 HCHG RX REV CODE 636 W/ 250 OVERRIDE (IP): Performed by: HOSPITALIST

## 2021-02-02 PROCEDURE — 302102 BAG OST N IMG 2.25IN 2PC (FECAL): Performed by: INTERNAL MEDICINE

## 2021-02-02 PROCEDURE — 99223 1ST HOSP IP/OBS HIGH 75: CPT | Performed by: PSYCHIATRY & NEUROLOGY

## 2021-02-02 PROCEDURE — 82962 GLUCOSE BLOOD TEST: CPT

## 2021-02-02 PROCEDURE — 85347 COAGULATION TIME ACTIVATED: CPT

## 2021-02-02 PROCEDURE — 80053 COMPREHEN METABOLIC PANEL: CPT

## 2021-02-02 PROCEDURE — 85576 BLOOD PLATELET AGGREGATION: CPT

## 2021-02-02 PROCEDURE — 36415 COLL VENOUS BLD VENIPUNCTURE: CPT

## 2021-02-02 PROCEDURE — 700102 HCHG RX REV CODE 250 W/ 637 OVERRIDE(OP): Performed by: PSYCHIATRY & NEUROLOGY

## 2021-02-02 PROCEDURE — A9270 NON-COVERED ITEM OR SERVICE: HCPCS | Performed by: HOSPITALIST

## 2021-02-02 PROCEDURE — 84100 ASSAY OF PHOSPHORUS: CPT

## 2021-02-02 PROCEDURE — 700102 HCHG RX REV CODE 250 W/ 637 OVERRIDE(OP): Performed by: HOSPITALIST

## 2021-02-02 PROCEDURE — 93005 ELECTROCARDIOGRAM TRACING: CPT | Performed by: PSYCHIATRY & NEUROLOGY

## 2021-02-02 PROCEDURE — 99232 SBSQ HOSP IP/OBS MODERATE 35: CPT | Performed by: INTERNAL MEDICINE

## 2021-02-02 PROCEDURE — 97166 OT EVAL MOD COMPLEX 45 MIN: CPT

## 2021-02-02 PROCEDURE — 85384 FIBRINOGEN ACTIVITY: CPT

## 2021-02-02 PROCEDURE — 302098 PASTE RING (FLAT): Performed by: INTERNAL MEDICINE

## 2021-02-02 RX ORDER — RISPERIDONE 2 MG/1
2 TABLET ORAL 2 TIMES DAILY
Status: DISCONTINUED | OUTPATIENT
Start: 2021-02-02 | End: 2021-02-05 | Stop reason: HOSPADM

## 2021-02-02 RX ORDER — MODAFINIL 100 MG/1
100 TABLET ORAL EVERY MORNING
Status: DISCONTINUED | OUTPATIENT
Start: 2021-02-02 | End: 2021-02-05 | Stop reason: HOSPADM

## 2021-02-02 RX ADMIN — FUROSEMIDE 20 MG: 20 TABLET ORAL at 05:18

## 2021-02-02 RX ADMIN — TAMSULOSIN HYDROCHLORIDE 0.4 MG: 0.4 CAPSULE ORAL at 08:19

## 2021-02-02 RX ADMIN — ASPIRIN 325 MG: 325 TABLET, FILM COATED ORAL at 05:20

## 2021-02-02 RX ADMIN — ATORVASTATIN CALCIUM 40 MG: 40 TABLET, FILM COATED ORAL at 18:02

## 2021-02-02 RX ADMIN — DILTIAZEM HYDROCHLORIDE 360 MG: 180 CAPSULE, COATED, EXTENDED RELEASE ORAL at 05:19

## 2021-02-02 RX ADMIN — LORATADINE 10 MG: 10 TABLET ORAL at 05:19

## 2021-02-02 RX ADMIN — RISPERIDONE 2 MG: 2 TABLET ORAL at 18:02

## 2021-02-02 RX ADMIN — DIGOXIN 125 MCG: 125 TABLET ORAL at 05:19

## 2021-02-02 RX ADMIN — SERTRALINE HYDROCHLORIDE 25 MG: 25 TABLET ORAL at 05:18

## 2021-02-02 RX ADMIN — INSULIN GLARGINE 15 UNITS: 100 INJECTION, SOLUTION SUBCUTANEOUS at 05:22

## 2021-02-02 RX ADMIN — DOCUSATE SODIUM 50 MG AND SENNOSIDES 8.6 MG 2 TABLET: 8.6; 5 TABLET, FILM COATED ORAL at 05:18

## 2021-02-02 RX ADMIN — ENOXAPARIN SODIUM 40 MG: 40 INJECTION SUBCUTANEOUS at 05:20

## 2021-02-02 RX ADMIN — LEVOTHYROXINE SODIUM 112 MCG: 0.11 TABLET ORAL at 05:19

## 2021-02-02 RX ADMIN — MODAFINIL 100 MG: 100 TABLET ORAL at 18:02

## 2021-02-02 RX ADMIN — FLUDROCORTISONE ACETATE 0.1 MG: 0.1 TABLET ORAL at 05:19

## 2021-02-02 ASSESSMENT — COGNITIVE AND FUNCTIONAL STATUS - GENERAL
DAILY ACTIVITIY SCORE: 22
HELP NEEDED FOR BATHING: A LITTLE
TOILETING: A LITTLE
SUGGESTED CMS G CODE MODIFIER DAILY ACTIVITY: CJ

## 2021-02-02 ASSESSMENT — ENCOUNTER SYMPTOMS
DIZZINESS: 0
INSOMNIA: 0
BACK PAIN: 1
WEAKNESS: 1
DOUBLE VISION: 0
BACK PAIN: 0
HEADACHES: 0
VOMITING: 0
CONSTIPATION: 0
BLURRED VISION: 0
SHORTNESS OF BREATH: 0
FEVER: 0
PALPITATIONS: 0
ABDOMINAL PAIN: 0
TREMORS: 0
NAUSEA: 0
NERVOUS/ANXIOUS: 1
DEPRESSION: 1
INSOMNIA: 1
DIARRHEA: 0
HALLUCINATIONS: 1
FOCAL WEAKNESS: 1
COUGH: 0
SORE THROAT: 0

## 2021-02-02 ASSESSMENT — LIFESTYLE VARIABLES: SUBSTANCE_ABUSE: 0

## 2021-02-02 ASSESSMENT — ACTIVITIES OF DAILY LIVING (ADL): TOILETING: INDEPENDENT

## 2021-02-02 ASSESSMENT — PAIN DESCRIPTION - PAIN TYPE: TYPE: ACUTE PAIN

## 2021-02-02 NOTE — CONSULTS
Alert Team  Duplicate consult order placed by Dr Colon.  Pt was already seen by AT in ER; see consult note.  Pt has active order for IP Consult to Psychiatry, so he will be followed by psychiatry upstairs.  Initiated LH in paper chart.

## 2021-02-02 NOTE — H&P
Hospital Medicine History & Physical Note    Date of Service  2/1/2021    Primary Care Physician  CRYSTAL Terrazas.    Consultants  Psychiatry    Code Status  FULL    Chief Complaint  Chief Complaint   Patient presents with   • Suicidal Ideation     reports having thoughts of killing himself over the past 2 days.        History of Presenting Illness  55 y.o. male with diabetes, atrial fibrillation, obesity, CHF, DLD, who presented 2/1/2021 with suicidal ideations.  [facility] but was getting depressed as he was getting ready to be discharged to group home.  This morning he states sliding out of bed but did not hit his head and due to left leg weakness which is been there for about a week he felt scared and depressed about going home as he felt like he could manage.  He states he will be residing at a group home. Patient denies hitting his head but states back in October he had his head but did not lose consciousness.  Patient states left leg weakness he can move his lower leg but states it feels like a lead brick.  Patient states he has decreased sensation below his knee on the left side.  He has good dexterity and strength in the upper extremities and the right lower extremity.  States prior to a week ago he was able to stand without any issues but he states now he is down to floor he cannot get up safely on his own.  He denies any headache denies any blurred vision or any problems swallowing or with his speech.  The patient states he does feel suicidal and he has been hearing voices and has had without any visual hallucinations.  He states he is afraid of hurting himself with cutting his wrists or taking medications or possibly hurting somebody and he does not want to do that.  He states he is schizophrenic but has not been on any medications for it in the past.  He realizes these voices are not real.  The patient is requesting medications for  depression.    Review of Systems  Review of Systems   Constitutional: Negative for chills and fever.   Respiratory: Negative for cough, shortness of breath and stridor.    Cardiovascular: Negative for chest pain, palpitations and leg swelling.   Gastrointestinal: Negative for abdominal pain, diarrhea and nausea.   Genitourinary: Negative for dysuria and hematuria.   Musculoskeletal: Negative for back pain and joint pain.   Skin: Negative for rash.   Neurological: Positive for dizziness and sensory change. Negative for speech change and headaches.   Psychiatric/Behavioral: Positive for depression and hallucinations. The patient is not nervous/anxious.        Past Medical History   has a past medical history of A-fib ([facility]), Abscess (2020), Bacteremia (2020), Chest pain, Congestive heart failure ([facility]), Diabetes ([facility]), Diabetic neuropathy ([facility]), Hypercholesterolemia, Hypertension, Longstanding persistent atrial fibrillation ([facility]) (2020), LV dysfunction (2020), Morbid obesity ([facility]), NICM (nonischemic cardiomyopathy) ([facility]) (2020), Noncompliance, Normal cardiac stress test, Orthostatic hypotension, and Sepsis ([facility]) (2020).    Surgical History   has a past surgical history that includes toe amputation (Right, 11/10/2017); toe amputation (Right, 2018); irrigation & debridement ortho (Right, 2018); zzz cardiac cath (2018); and colectomy (N/A, 11/10/2020).     Family History  Mother  in mid '80.  Father  with lung cancer    Social History   reports that he has never smoked. He has never used smokeless tobacco. He reports that he does not drink alcohol or use drugs. States he is .  States his son  from COVID this past December    Allergies  Allergies   Allergen Reactions   • Daptomycin Rash     Body rash  RXN=2018     • Pcn [Penicillins] Hives and Rash      Rash & hives  RXN=since a kid  Tolerates cephalosporins   • Unasyn [Ampicillin-Sulbactam Sodium] Hives,  Itching and Swelling   • Vancomycin Rash     Red man syndrome. Tolerates IV vancomycin at reduced infusion rate.       Medications  Prior to Admission Medications   Prescriptions Last Dose Informant Patient Reported? Taking?   Diltiazem HCl Coated Beads 360 MG TABLET SR 24 HR 2/1/2021 at 0804 MAR from Other Facility Yes Yes   Sig: Take 360 mg by mouth every morning.   acetaminophen (TYLENOL) 325 MG Tab PRN at PRN MAR from Other Facility No No   Sig: Take 2 Tabs by mouth every 6 hours as needed.   Patient taking differently: Take 650 mg by mouth every 6 hours as needed (pain).   albuterol (PROVENTIL) 2.5mg/0.5ml Nebu Soln Not Taking at Not Taking MAR from Other Facility No No   Sig: Take 0.5 mL by nebulization every 6 hours as needed for Shortness of Breath.   Patient not taking: Reported on 2/1/2021   amiodarone (CORDARONE) 200 MG Tab Not Taking at STOPPED MAR from Other Facility No No   Sig: Take 1 Tab by mouth every day.   Patient not taking: Reported on 2/1/2021   cetirizine (ZYRTEC) 10 MG Tab Not Taking at Not Taking MAR from Other Facility No No   Sig: Take 1 Tab by mouth every day.   Patient not taking: Reported on 2/1/2021   digoxin (LANOXIN) 125 MCG Tab 1/31/2021 at 1736 MAR from Other Facility Yes Yes   Sig: Take 125 mcg by mouth every day.   famotidine (PEPCID) 20 MG Tab Not Taking at Not Taking MAR from Other Facility No No   Sig: Take 1 Tab by mouth every day.   Patient not taking: Reported on 2/1/2021   fludrocortisone (FLORINEF) 0.1 MG Tab 2/1/2021 at 0543 MAR from Other Facility Yes Yes   Sig: Take 0.1 mg by mouth every morning.   furosemide (LASIX) 20 MG Tab Not Taking at Not Taking MAR from Other Facility No No   Sig: Take 1 Tab by mouth 2 Times a Day.   Patient not taking: Reported on 2/1/2021   hydrOXYzine HCl (ATARAX) 25 MG Tab 2/1/2021 at 1132 MAR from Other Facility Yes Yes   Sig: Take 25 mg by mouth every 6 hours as needed for Anxiety.   insulin glargine (LANTUS) 100 UNIT/ML Solution 2/1/2021  at 0804 MAR from Other Facility No No   Sig: Inject 15 Units under the skin every morning.   insulin regular (HUMULIN R) 100 Unit/mL Solution Not Taking at STOPPED MAR from Other Facility No No   Sig: Inject 3-14 Units under the skin every 6 hours.   Patient not taking: Reported on 2/1/2021   levothyroxine (SYNTHROID) 112 MCG Tab 2/1/2021 at 0543 MAR from Other Facility No No   Sig: Take 1 Tab by mouth Every morning on an empty stomach.   lisinopril (PRINIVIL) 2.5 MG Tab Not Taking at Not Taking MAR from Other Facility No No   Sig: Take 1 Tab by mouth every day.   Patient not taking: Reported on 2/1/2021   loratadine (CLARITIN) 10 MG Tab 2/1/2021 at 0804 MAR from Other Facility Yes Yes   Sig: Take 10 mg by mouth every morning.   metoprolol SR (TOPROL XL) 50 MG TABLET SR 24 HR Not Taking at Not Taking MAR from Other Facility No No   Sig: Take 1 Tab by mouth 2 Times a Day.   Patient not taking: Reported on 2/1/2021   midodrine (PROAMATINE) 2.5 MG Tab PRN at PRN MAR from Other Facility Yes Yes   Sig: Take 2.5 mg by mouth 1 time a day as needed (prior to therapy). Indications: Blood Pressure Drop Upon Standing   ondansetron (ZOFRAN ODT) 4 MG TABLET DISPERSIBLE PRN at PRN MAR from Other Facility Yes Yes   Sig: Take 4 mg by mouth every 6 hours as needed for Nausea.   risperiDONE (RISPERDAL) 1 MG Tab Not Taking at Dose Changed MAR from Other Facility No No   Sig: Take 1 Tab by mouth every evening.   Patient not taking: Reported on 2/1/2021   risperiDONE (RISPERDAL) 1 MG Tab 1/31/2021 at 2126 MAR from Other Facility Yes Yes   Sig: Take 2.5 mg by mouth every bedtime. 2 1/2 tablets = 2.5 mg   sennosides (SENOKOT) 8.6 MG Tab PRN at PRN MAR from Other Facility Yes Yes   Sig: Take 8.6 mg by mouth 1 time a day as needed.   spironolactone (ALDACTONE) 25 MG Tab Not Taking at Dose Change MAR from Other Facility No No   Sig: Take 0.5 Tabs by mouth every day.   Patient not taking: Reported on 2/1/2021   tamsulosin (FLOMAX) 0.4 MG  capsule 2/1/2021 at 0804 MAR from Other Facility No No   Sig: Take 1 Cap by mouth 1/2 hour after breakfast.   traZODone (DESYREL) 50 MG Tab Not Taking at Not Taking MAR from Other Facility No No   Sig: Take 1 Tab by mouth every bedtime.   Patient not taking: Reported on 2/1/2021      Facility-Administered Medications: None       Physical Exam  Temp:  [36.7 °C (98.1 °F)] 36.7 °C (98.1 °F)  Pulse:  [81] 81  Resp:  [18] 18  BP: (138)/(80) 138/80  SpO2:  [97 %] 97 %    Physical Exam  Vitals signs reviewed.   Constitutional:       Appearance: Normal appearance. He is not diaphoretic.   HENT:      Head: Normocephalic and atraumatic.      Nose: Nose normal.      Mouth/Throat:      Mouth: Mucous membranes are moist.      Pharynx: No oropharyngeal exudate.   Eyes:      General: No scleral icterus.        Right eye: No discharge.         Left eye: No discharge.      Extraocular Movements: Extraocular movements intact.      Conjunctiva/sclera: Conjunctivae normal.      Pupils: Pupils are equal, round, and reactive to light.   Neck:      Musculoskeletal: No muscular tenderness.      Vascular: No carotid bruit.   Cardiovascular:      Rate and Rhythm: Normal rate and regular rhythm.      Pulses:           Radial pulses are 2+ on the right side and 2+ on the left side.        Dorsalis pedis pulses are 2+ on the right side and 2+ on the left side.      Heart sounds: No murmur.   Pulmonary:      Effort: Pulmonary effort is normal. No respiratory distress.      Breath sounds: Normal breath sounds. No wheezing or rales.   Abdominal:      General: Bowel sounds are normal. There is no distension.      Palpations: Abdomen is soft.      Comments: LLQ colostomy with stool in bag   Musculoskeletal:         General: No swelling or tenderness.   Lymphadenopathy:      Cervical: No cervical adenopathy.   Skin:     Coloration: Skin is not jaundiced or pale.   Neurological:      General: No focal deficit present.      Mental Status: He is alert  and oriented to person, place, and time. Mental status is at baseline.      Cranial Nerves: No cranial nerve deficit.   Psychiatric:         Mood and Affect: Mood normal.         Behavior: Behavior normal.         Laboratory:  Recent Labs     02/01/21  1440   WBC 8.7   RBC 4.40*   HEMOGLOBIN 12.8*   HEMATOCRIT 39.7*   MCV 90.2   MCH 29.1   MCHC 32.2*   RDW 49.3   PLATELETCT 221   MPV 10.8     Recent Labs     02/01/21  1440   SODIUM 135   POTASSIUM 4.1   CHLORIDE 102   CO2 21   GLUCOSE 133*   BUN 19   CREATININE 1.10   CALCIUM 9.3     Recent Labs     02/01/21  1440   ALTSGPT 78*   ASTSGOT 50*   ALKPHOSPHAT 111*   TBILIRUBIN 0.4   GLUCOSE 133*     Recent Labs     02/01/21  1440   INR 1.01     No results for input(s): NTPROBNP in the last 72 hours.      No results for input(s): TROPONINT in the last 72 hours.    Imaging:  CT-CTA HEAD WITH & W/O-POST PROCESS   Final Result      CT angiogram of the [place] within normal limits.      CT-CTA NECK WITH & W/O-POST PROCESSING   Final Result      CT angiogram of the neck notable for some plaque but no significant stenosis, dissection or occlusion      Distal bilateral ICA tortuosity.      US-EXTREMITY VENOUS LOWER UNILAT LEFT   Final Result      MR-BRAIN-W/O    (Results Pending)         Assessment/Plan:  I anticipate this patient will require at least two midnights for appropriate medical management, necessitating inpatient admission.    Anxiety and depression- (present on admission)  Assessment & Plan  Start zoloft  Psychiatry consult placed.    Hypothyroidism- (present on admission)  Assessment & Plan  Synthroid 112 mcg  Last TSH:15 2 months ago.  Check TSH w/ reflex to free T4    Adrenal insufficiency (HCC)- (present on admission)  Assessment & Plan  Janna  May need midodrine if lower BP's    Suicidal ideation- (present on admission)  Assessment & Plan  Consulted Psychiatry  States he is hearing voices and he states he doesn't want to hurt himself or anyone  else.  Legal Hold    Chronic systolic heart failure (HCC)- (present on admission)  Assessment & Plan  Last Echo showed EF:30%  Check BNP  Has leg edema but denies SOB    Chronic atrial fibrillation- (present on admission)  Assessment & Plan  Monitor with vitals.  Continue digoxin and diltiazem with parameters  CTA head/neck w/o sign of stroke.    Essential hypertension- (present on admission)  Assessment & Plan  Monitor vitals.    Type 2 diabetes mellitus with hyperglycemia, without long-term current use of insulin ([facility])- (present on admission)  Assessment & Plan  Lantus 15units  Monitor accuchecks and cover with SSI  Last HgbA1c:7.8 in past 3 months    Left leg weakness  Assessment & Plan  CTA head and neck unremarkable.  MRI brain ordered.  ASA, Statin.  PT/OT    Elevated liver enzymes  Assessment & Plan  Question of obesity and hepatic congestion  Monitor CMP    Colostomy present ([facility])  Assessment & Plan  Colostomy care    Obesity- (present on admission)  Assessment & Plan  Body mass index is 39.2 kg/m².  Need weight loss and lifestyle modification.

## 2021-02-02 NOTE — DISCHARGE PLANNING
Care Transition Team Assessment    Per Md's Notes:  55 y.o. male with diabetes, atrial fibrillation, obesity, CHF, DLD, who presented 2/1/2021 with suicidal ideations.  Patient has been getting rehabilitation at [facility] but was getting depressed as he was getting ready to be discharged to group home. Psychiatry consult was placed, patient is on a Legal Hold, has a sitter at bedside. Patient stated he is hearing voices, states he does not want to hurt himself or anyone else. Patient was brought from [facility]. Medical history: DM, Essential hypertension, HF, ejection fraction, 30%, chronic a-fib, elevated liver enzymes, colostomy, obesity, suicidal ideation.     Anticipated Discharge Disposition: Group Home, [email or web address]. [address] [facility]. Group home ownerMatilde: [phone].     Action: This RN,  completed an initial case management assessment both with patient at bedside and chart review. Patient has a rent payee, Karol, P: [phone]    [facility] #[id number]  LOC #[id number]    Barriers to Discharge: On a legal hold, group home to accept patient    Plan:  to follow for all discharge planning, patient is anticipated to discharge to group home, [email or web address]. [address] [facility]. Group Home ownerMatilde, [phone]. This Group home is a one story group home, patient cannot tolerate stairs. Please call group home owner to verify patient can discharge to the above group home when cleared for discharge. Set up transportation.      Information Source  Orientation : Oriented x 4  Information Given By: Patient  Informant's Name: (Sebastián Castro)  Who is responsible for making decisions for patient? : Other  Name(s) of Primary Decision Maker: Patient currently on a legal hold    Readmission Evaluation  Is this a readmission?: No    Elopement Risk  Legal Hold: Elopement Risk  Time of Legal Hold: 1225  Date of Legal Hold: 02/01/21  Elopement  Risk: Not at Risk for Elopement  Wanderguard On: No (See Comments)  Personal Belongings: Hospital Clothing Only  Environmental Precautions: Sharp or Dangerous Items Removed    Interdisciplinary Discharge Planning  Patient or legal guardian wants to designate a caregiver: No    Discharge Preparedness  What is your plan after discharge?: Group home, Other (comment)  What are your discharge supports?: Other (comment)([facility] staff)  Prior Functional Level: Ambulatory, Needs Assist with Activities of Daily Living, Needs Assist with Medication Management, Uses Walker  Difficulity with ADLs: Bathing, Walking  Difficulity with IADLs: Cooking, Driving, Keeping track of finances, Laundry, Managing medication, Shopping, Using the telephone or computer  Difficulity with IADL Comments: (patient has a rent payee, lives at a group home)    Functional Assesment  Prior Functional Level: Ambulatory, Needs Assist with Activities of Daily Living, Needs Assist with Medication Management, Uses Walker    Finances  Financial Barriers to Discharge: No  Prescription Coverage: Yes              Advance Directive  Advance Directive?: None  Advance Directive offered?: AD Booklet refused    Domestic Abuse  Have you ever been the victim of abuse or violence?: No         Discharge Risks or Barriers  Discharge risks or barriers?: Mental health, Other (comment)(on Legal hold)  Patient risk factors: Bariatric, Cognitive / sensory / physical deficit, Lack of outside supports, Mental health, Vulnerable adult    Anticipated Discharge Information  Discharge Disposition: Still a Patient (30)  Discharge Address: (TBD)  Discharge Contact Phone Number: ([phone])

## 2021-02-02 NOTE — PROGRESS NOTES
2 RN Skin Check    2 RN skin check complete. ELISA [place]  Devices in place: None.  Skin assessed under devices: N\A.  Confirmed pressure ulcers found on: None.  New potential pressure ulcers noted on Left buttock.   Wound consult placed Yes.  The following interventions in place Pillows, Mepilex and Lotion, interdry    Assessment  Bilat Ears: pink and blanchable  Bilat elbows: pink, dry, blanchable  Skin folds: pink, red, moist  Wound: middle abdomen  Bilat heels: pink, dry, blanchable  Left calf: dry, flaky, pink  Sacrum: pressure area, pink, blanchable  Right Foot: Red spot top of foot, blanchable, proximal foot red and blanchable  Left 2nd Digit: red spot, not blanchable  Perpheral IV: Left forearm  Abdomen: Colostomy

## 2021-02-02 NOTE — CARE PLAN
Problem: Knowledge Deficit  Goal: Knowledge of disease process/condition, treatment plan, diagnostic tests, and medications will improve  Outcome: PROGRESSING AS EXPECTED  Note: Pt educated regarding plan of care and medications. All questions answered.    Intervention: Assess knowledge level of disease process/condition, treatment plan, diagnostic tests, and medications  Note: Pt educated regarding plan of care and medications. All questions answered.    Intervention: Explain information regarding disease process/condition, treatment plan, diagnostic tests, and medications and document in education  Note: Pt educated regarding plan of care and medications. All questions answered.     Problem: Potential for Self Harm  Goal: Improved mood and functioning  Outcome: PROGRESSING AS EXPECTED  Note: Pt assessed regularly for behavior.  Incorporated pt in interventions for self care and management    Goal: No active self-harm  Outcome: PROGRESSING AS EXPECTED  Note: Legal hold verified; 1 to 1 sitter in place; only plastic utensils with meals; room free from potentially dangerous items.

## 2021-02-02 NOTE — ASSESSMENT & PLAN NOTE
States he is hearing multiple voices  Patient has continued suicidal ideation  Consulted Psychiatry, recommendations made for patient  Continue Modafinil and Risperdal  Discontinued Sertaline  Legal Hold continued  Patient recommended for inpatient pyschiatric facility  Medically cleared

## 2021-02-02 NOTE — ASSESSMENT & PLAN NOTE
CTA head and neck and MRI brain unremarkable.  ASA, Statin.  PT/OT  Patient's leg weakness has significantly improved, patient stated he is able to walk better now with minimal assistance.  This may be a component of patient's previous psych issues upon admission, modafinil has significantly helped patient to be more active.

## 2021-02-02 NOTE — CONSULTS
"PSYCHIATRIC INTAKE EVALUATION    *Reason for admission : Suicidal ideation, left lower extremity weakness  *Reason for consult: \" SI.  Already seen by alert team.  Per bedside RN, patient is going to be admitted to medical unit so please see\"  *Requesting Physician/APN:  Addison Campbell M.D.        Legal Hold status: On hold    *Chief Complaint:: \" I am better off dead than alive\"     *HPI (includes Psychiatric ROS): Patient reports he feels he would be better off dead than alive.  He stated that yesterday he slipped out of his bed at the rehab facility and could not get up.  Stated that his weakness, having a colostomy bag and having auditory hallucinations have triggered his suicidal thoughts.  He stated that he is having auditory hallucinations for the past 1 to 2 weeks, hearing 3 4 voices chattering in his head, telling him that he would be better off dead.  He reports currently hearing the voices.  He stated that that if he gets medication to treat the voices it is a possibility that he could feel better and no longer suicidal.  Currently feels that it is a possibility that he could kill himself by cutting his wrists.  Stated that he attempted suicide by cutting his wrists 1 year and a half ago after the death of his 38-year-old son from cancer.  I asked him to show me the scars from the attempt, but I could not see any.  Patient continues to endorse suicidal ideation at this time.  However, he did state that he would like to get physically better, be able to walk again and go back to his hobbies which used to be car models, painting, speedy work and reading  books.  He is currently interested in getting coloring books and books to read in his room.  Patient endorses depression since the death of his son, worsened recently by his physical debilitation.  Reported some anhedonia, decreased sleep, fatigue, guilty feelings, decreased concentration.  He reports feeling a little bit anxious due to the fear of " the unknown.  No trauma symptoms.  No manic or hypomanic symptoms in the past.  Denies using drugs or alcohol.  Patient agreed with having risperidone increased.      *Medical Review Of Symptoms (not dx conditions):   Review of Systems   Constitutional: Positive for malaise/fatigue.   HENT: Negative for sore throat.    Eyes: Negative for blurred vision and double vision.   Respiratory: Negative for shortness of breath.    Cardiovascular: Negative for chest pain.   Gastrointestinal: Negative for abdominal pain, constipation, diarrhea, nausea and vomiting.   Genitourinary: Negative for dysuria.   Musculoskeletal: Positive for back pain.   Skin: Negative for rash.   Neurological: Positive for focal weakness. Negative for dizziness, tremors and headaches.   Psychiatric/Behavioral: Positive for depression, hallucinations and suicidal ideas. Negative for substance abuse. The patient is nervous/anxious. The patient does not have insomnia.          *Psychiatric Examination:   Vitals:   Vitals:    02/01/21 1901 02/01/21 2349 02/02/21 0400 02/02/21 0700   BP: 138/79 133/76 140/70 135/78   Pulse: 79 75 100 83   Resp: 18 18 20 17   Temp: 36.9 °C (98.4 °F) 36.4 °C (97.6 °F) 36.4 °C (97.6 °F) 36.8 °C (98.2 °F)   TempSrc: Oral Temporal Temporal Temporal   SpO2: 96% 96% 97% 98%   Weight:       Height:         Appearance: appears stated age, fair grooming and hygiene, calm, cooperative, good eye contact  Abnormal movements: none  Gait/posture: normal  Speech: normal volume, tone and rhythm  Though process: linear and goal oriented  Associations: no loose associations  Thought content: +AH, no delusions or paranoia elicited, does not appear to be responding to internal stimuli, neither is internally preoccupied.   Judgement and Insight: fair/fair  Orientation:oriented to person, place, time and situation  Recent and Remote Memory: intact  Attention Span and Concentration: intact  Language: fluid   Fund of Knowledge: not tested   Mood  "and Affect:\" Depressed\", depressed  SI/HI: Endorsing suicidal ideation with possible intention.  Method is by cutting his wrists, no specific plan.              *PAST MEDICAL/PSYCH/FAMILY/SOCIAL(as reported by patient):       *medical hx:           Past Medical History:   Diagnosis Date   • A-fib (HCC)    • Abscess 11/8/2020   • Bacteremia 11/8/2020   • Chest pain    • Congestive heart failure (HCC)    • Diabetes ([facility])    • Diabetic neuropathy ([facility])    • Hypercholesterolemia    • Hypertension    • Longstanding persistent atrial fibrillation ([facility]) 11/8/2020   • LV dysfunction 11/8/2020   • Morbid obesity ([facility])    • NICM (nonischemic cardiomyopathy) ([facility]) 11/8/2020   • Noncompliance    • Normal cardiac stress test    • Orthostatic hypotension    • Sepsis ([facility]) 11/8/2020     Past Surgical History:   Procedure Laterality Date   • COLECTOMY N/A 11/10/2020    Procedure: COLECTOMY-SEGMENTAL, COLOSTOMY, MOBILIZATION OF SPLENIC FLEXURE, WOUND VAC PLACEMENT, IRRIGATION AND DEBRIDMENT FLANK WOUND;  Surgeon: Kin Desouza D.O.;  Location: [facility];  Service: General   • ZZZ CARDIAC CATH  07/21/2018    EF 45%, normal coronaries.   • IRRIGATION & DEBRIDEMENT ORTHO Right 2/19/2018    Procedure: IRRIGATION & DEBRIDEMENT ORTHO-FOOT;  Surgeon: Kirby Lopez M.D.;  Location: [facility];  Service: Orthopedics   • TOE AMPUTATION Right 1/17/2018    Procedure: TOE AMPUTATION-1ST RAY;  Surgeon: Doug Delong M.D.;  Location: [facility];  Service: Orthopedics   • TOE AMPUTATION Right 11/10/2017    Procedure: TOE AMPUTATION;  Surgeon: Osorio Leo M.D.;  Location: [facility];  Service: Orthopedics        *psychiatric hx:    Patient is known to our services.  He has a previous diagnosis of MDD, previously on Risperdal, modafinil and trazodone.  Per Dr. Posey, patient responded well to modafinil on December of last year.  He was on trazodone for sleep.    He has a history of being on " Zoloft, Zyprexa in the past.  Zyprexa was discontinued due to metabolic syndrome.    He also has a history of functional neurological symptoms.    *family Psych hx: Denies     *social hx: , on disability.  Patient's adult son  a year and a half ago from cancer . patient is from [place], residing in [place] for 22 years.  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  Alcohol: Denies  Drugs: Denies     *MEDICAL HX: labs, MARS, medications, etc were reviewed. Only those findings of potential interest to psychiatry are noted below:    *Current Medical issues:   see below     *Allergies:  Allergies   Allergen Reactions   • Daptomycin Rash     Body rash  RXN=2018     • Pcn [Penicillins] Hives and Rash      Rash & hives  RXN=since a kid  Tolerates cephalosporins   • Unasyn [Ampicillin-Sulbactam Sodium] Hives, Itching and Swelling   • Vancomycin Rash     Red man syndrome. Tolerates IV vancomycin at reduced infusion rate.      *Current Medications:    Current Facility-Administered Medications:   •  digoxin (LANOXIN) tablet 125 mcg, 125 mcg, Oral, DAILY, DANGELO Urrutia.O., 125 mcg at 21  •  DILTIAZem CD (CARDIZEM CD) capsule 360 mg, 360 mg, Oral, QAM, DANGELO Urrutia.O., 360 mg at 21  •  fludrocortisone (FLORINEF) tablet 0.1 mg, 0.1 mg, Oral, QAM, DANGELO Urrutia.O., 0.1 mg at 21  •  insulin glargine (Lantus) injection, 15 Units, Subcutaneous, QAM, Wicho Colon D.O., 15 Units at 21  •  levothyroxine (SYNTHROID) tablet 112 mcg, 112 mcg, Oral, AM ES, DANGELO Urrutia.O., 112 mcg at 21  •  loratadine (CLARITIN) tablet 10 mg, 10 mg, Oral, QAM, DANGELO Urrutia.O., 10 mg at 21  •  risperiDONE (RISPERDAL) tablet 2.5 mg, 2.5 mg, Oral, QHS, Wicho Colon D.O., 2.5 mg at 21  •  tamsulosin (FLOMAX) capsule 0.4 mg, 0.4 mg, Oral, AFTER BREAKFAST, Wicho Colon D.O., 0.4 mg at 21  •  senna-docusate (PERICOLACE  or SENOKOT S) 8.6-50 MG per tablet 2 Tab, 2 Tab, Oral, BID, 2 Tab at 02/02/21 0518 **AND** polyethylene glycol/lytes (MIRALAX) PACKET 1 Packet, 1 Packet, Oral, QDAY PRN **AND** magnesium hydroxide (MILK OF MAGNESIA) suspension 30 mL, 30 mL, Oral, QDAY PRN **AND** bisacodyl (DULCOLAX) suppository 10 mg, 10 mg, Rectal, QDAY PRN, DANGELO Urrutia.O.  •  acetaminophen (Tylenol) tablet 650 mg, 650 mg, Oral, Q6HRS PRN, JONNY UrrutiaO., 650 mg at 02/01/21 1750  •  sertraline (Zoloft) tablet 25 mg, 25 mg, Oral, DAILY, DANGELO Urrutia.O., 25 mg at 02/02/21 0518  •  enoxaparin (LOVENOX) inj 40 mg, 40 mg, Subcutaneous, DAILY, JONNY UrrutiaO., 40 mg at 02/02/21 0520  •  aspirin (ASA) tablet 325 mg, 325 mg, Oral, DAILY, JONNY UrrutiaO., 325 mg at 02/02/21 0520  •  atorvastatin (LIPITOR) tablet 40 mg, 40 mg, Oral, Q EVENING, DANGELO Urrutia.O., 40 mg at 02/01/21 1750  •  furosemide (LASIX) tablet 20 mg, 20 mg, Oral, Q DAY, DANGELO Urrutia.O., 20 mg at 02/02/21 0518  *ECG: personally reviewed QTc  519  *Cranial Imaging: personally reviewed brain MRI in 2018:1.  MRI of the brain without contrast within normal limits for age with mild white matter changes.  2.  Chronic appearing sinus disease  3.  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 This does not have an aggressive appearance.    Current brain MRI ordered pending     EEG: Patient had an EEG in 2018 which was unremarkable     *Labs:  Recent Results (from the past 72 hour(s))   POC BREATHALIZER    Collection Time: 02/01/21  1:50 PM   Result Value Ref Range    POC Breathalizer 0.00 0.00 - 0.01 Percent   CBC WITH DIFFERENTIAL    Collection Time: 02/01/21  2:40 PM   Result Value Ref Range    WBC 8.7 4.8 - 10.8 K/uL    RBC 4.40 (L) 4.70 - 6.10 M/uL    Hemoglobin 12.8 (L) 14.0 - 18.0 g/dL    Hematocrit 39.7 (L) 42.0 - 52.0 %    MCV 90.2 81.4 - 97.8 fL    MCH 29.1 27.0 - 33.0 pg    MCHC 32.2 (L) 33.7 - 35.3 g/dL     RDW 49.3 35.9 - 50.0 fL    Platelet Count 221 164 - 446 K/uL    MPV 10.8 9.0 - 12.9 fL    Neutrophils-Polys 59.10 44.00 - 72.00 %    Lymphocytes 27.00 22.00 - 41.00 %    Monocytes 8.80 0.00 - 13.40 %    Eosinophils 3.80 0.00 - 6.90 %    Basophils 0.70 0.00 - 1.80 %    Immature Granulocytes 0.60 0.00 - 0.90 %    Nucleated RBC 0.00 /100 WBC    Neutrophils (Absolute) 5.16 1.82 - 7.42 K/uL    Lymphs (Absolute) 2.36 1.00 - 4.80 K/uL    Monos (Absolute) 0.77 0.00 - 0.85 K/uL    Eos (Absolute) 0.33 0.00 - 0.51 K/uL    Baso (Absolute) 0.06 0.00 - 0.12 K/uL    Immature Granulocytes (abs) 0.05 0.00 - 0.11 K/uL    NRBC (Absolute) 0.00 K/uL   CMP    Collection Time: 02/01/21  2:40 PM   Result Value Ref Range    Sodium 135 135 - 145 mmol/L    Potassium 4.1 3.6 - 5.5 mmol/L    Chloride 102 96 - 112 mmol/L    Co2 21 20 - 33 mmol/L    Anion Gap 12.0 7.0 - 16.0    Glucose 133 (H) 65 - 99 mg/dL    Bun 19 8 - 22 mg/dL    Creatinine 1.10 0.50 - 1.40 mg/dL    Calcium 9.3 8.5 - 10.5 mg/dL    AST(SGOT) 50 (H) 12 - 45 U/L    ALT(SGPT) 78 (H) 2 - 50 U/L    Alkaline Phosphatase 111 (H) 30 - 99 U/L    Total Bilirubin 0.4 0.1 - 1.5 mg/dL    Albumin 3.7 3.2 - 4.9 g/dL    Total Protein 6.9 6.0 - 8.2 g/dL    Globulin 3.2 1.9 - 3.5 g/dL    A-G Ratio 1.2 g/dL   PT/INR    Collection Time: 02/01/21  2:40 PM   Result Value Ref Range    PT 13.6 12.0 - 14.6 sec    INR 1.01 0.87 - 1.13   ESTIMATED GFR    Collection Time: 02/01/21  2:40 PM   Result Value Ref Range    GFR If African American >60 >60 mL/min/1.73 m 2    GFR If Non African American >60 >60 mL/min/1.73 m 2   Urine Drug Screen    Collection Time: 02/01/21  5:02 PM   Result Value Ref Range    Amphetamines Urine Negative Negative    Barbiturates Negative Negative    Benzodiazepines Negative Negative    Cocaine Metabolite Negative Negative    Methadone Negative Negative    Opiates Negative Negative    Oxycodone Negative Negative    Phencyclidine -Pcp Negative Negative    Propoxyphene Negative  Negative    Cannabinoid Metab Negative Negative   SARS-CoV-2 PCR (24 hour In-House): Collect NP swab in VTM    Collection Time: 02/01/21  6:00 PM    Specimen: Respirate   Result Value Ref Range    SARS-CoV-2 Source NP Swab     SARS-CoV-2 by PCR NotDetected    Prothrombin Time    Collection Time: 02/01/21  9:09 PM   Result Value Ref Range    PT 14.4 12.0 - 14.6 sec    INR 1.09 0.87 - 1.13   TSH WITH REFLEX TO FT4    Collection Time: 02/01/21  9:09 PM   Result Value Ref Range    TSH 4.750 0.380 - 5.330 uIU/mL   proBrain Natriuretic Peptide, NT    Collection Time: 02/01/21  9:09 PM   Result Value Ref Range    NT-proBNP 2804 (H) 0 - 125 pg/mL   BASIC TEG    Collection Time: 02/02/21 12:37 AM   Result Value Ref Range    Reaction Time Initial-R 6.9 5.0 - 10.0 min    Clot Kinetics-K 2.1 1.0 - 3.0 min    Clot Angle-Angle 61.4 53.0 - 72.0 degrees    Maximum Clot Strength-MA 62.3 50.0 - 70.0 mm    Lysis 30 minutes-LY30 0.2 0.0 - 8.0 %    TEG Algorithm Link Algorithm    ACCU-CHEK GLUCOSE    Collection Time: 02/02/21  5:10 AM   Result Value Ref Range    Glucose - Accu-Ck 101 (H) 65 - 99 mg/dL   DIGOXIN    Collection Time: 02/02/21  8:55 AM   Result Value Ref Range    Digoxin 1.2 0.8 - 2.0 ng/mL   Comp Metabolic Panel    Collection Time: 02/02/21  8:55 AM   Result Value Ref Range    Sodium 136 135 - 145 mmol/L    Potassium 4.1 3.6 - 5.5 mmol/L    Chloride 101 96 - 112 mmol/L    Co2 23 20 - 33 mmol/L    Anion Gap 12.0 7.0 - 16.0    Glucose 120 (H) 65 - 99 mg/dL    Bun 16 8 - 22 mg/dL    Creatinine 1.03 0.50 - 1.40 mg/dL    Calcium 9.3 8.5 - 10.5 mg/dL    AST(SGOT) 50 (H) 12 - 45 U/L    ALT(SGPT) 77 (H) 2 - 50 U/L    Alkaline Phosphatase 106 (H) 30 - 99 U/L    Total Bilirubin 0.4 0.1 - 1.5 mg/dL    Albumin 3.6 3.2 - 4.9 g/dL    Total Protein 6.9 6.0 - 8.2 g/dL    Globulin 3.3 1.9 - 3.5 g/dL    A-G Ratio 1.1 g/dL   MAGNESIUM    Collection Time: 02/02/21  8:55 AM   Result Value Ref Range    Magnesium 1.8 1.5 - 2.5 mg/dL   PHOSPHORUS     Collection Time: 02/02/21  8:55 AM   Result Value Ref Range    Phosphorus 4.1 2.5 - 4.5 mg/dL   ESTIMATED GFR    Collection Time: 02/02/21  8:55 AM   Result Value Ref Range    GFR If African American >60 >60 mL/min/1.73 m 2    GFR If Non African American >60 >60 mL/min/1.73 m 2       Recent Labs     02/01/21  1440   WBC 8.7   RBC 4.40*   HEMOGLOBIN 12.8*   HEMATOCRIT 39.7*   MCV 90.2   MCH 29.1   RDW 49.3   PLATELETCT 221   MPV 10.8   NEUTSPOLYS 59.10   LYMPHOCYTES 27.00   MONOCYTES 8.80   EOSINOPHILS 3.80   BASOPHILS 0.70     Lab Results   Component Value Date/Time    SODIUM 136 02/02/2021 08:55 AM    POTASSIUM 4.1 02/02/2021 08:55 AM    CHLORIDE 101 02/02/2021 08:55 AM    CO2 23 02/02/2021 08:55 AM    GLUCOSE 120 (H) 02/02/2021 08:55 AM    BUN 16 02/02/2021 08:55 AM    CREATININE 1.03 02/02/2021 08:55 AM         Lab Results   Component Value Date/Time    BREATHALIZER 0.00 02/01/2021 1350     No components found for: BLOODALCOHOL   Lab Results   Component Value Date/Time    AMPHUR Negative 02/01/2021 1702    BARBSURINE Negative 02/01/2021 1702    BENZODIAZU Negative 02/01/2021 1702    COCAINEMET Negative 02/01/2021 1702    METHADONE Negative 02/01/2021 1702    OPIATES Negative 02/01/2021 1702    OXYCODN Negative 02/01/2021 1702    PCPURINE Negative 02/01/2021 1702    PROPOXY Negative 02/01/2021 1702    CANNABINOID Negative 02/01/2021 1702     Lab Results   Component Value Date/Time    FREET4 1.14 11/20/2020 1736        Assessment: Patient is currently endorsing suicidal ideations with possible intent and specific method in the context of physical debilitation and auditory hallucinations.  Will extend hold at this time for further psychiatric stabilization for safe return to the community.      Dx:  MDD, recurrent, with psychotic features  History of SIS  History of neurological function no symptoms    Medical:  Hypothyroidism  Adrenal insufficiency  Chronic systolic heart failure  Chronic atrial  fibrillation  Essential hypertension  Diabetes type 2  Elevated liver enzymes  Colostomy  Obesity      Plan:  1- Legal hold: Extended  2- Psychotropic medications: Increase Risperdal to 2 mg p.o. twice daily to target psychosis  Restart modafinil 100 mg p.o. daily for depression and energy  3-obtain new EKG.  Last QTC was prolonged.  4- Please transfer pt to inpatient psychiatric hospital when medically cleared and bed is available  5- Psychiatry will follow up.     Thank you for the consult.     Sitter: Yes  Phone, personal belongings and visitors: Per nurse's discretion    This note was created using voice recognition software (Dragon). The accuracy of the dictation is limited by the abilitiesof the software. I have reviewed the note prior to signing. However, error related to voice recognition software and /or scribes may still exist and should be interpreted within the appropriate context.

## 2021-02-02 NOTE — PROGRESS NOTES
Received bedside report and accepted care of patient.    Pt is currently A/Ox4, room air with no visible or stated signs of distress, discomfort, or SOB. Pt's vital signs stable: /78, HR 83, Temp 36.8C    Pt needs were met. Pt has a colostomy bag, burped and changed. Pt has one PIV, saline locked.    Pt currently has a 1:1 sitter in place, report given to PSA. Room cleared appropriately.    Patient currently resting in bed in no visible or stated signs of distress. Bed in locked and lowest position. Call light and personal possessions within reach. Patient educated about use of call light and verbalized understanding.

## 2021-02-02 NOTE — CARE PLAN
Problem: Communication  Goal: The ability to communicate needs accurately and effectively will improve  Outcome: PROGRESSING AS EXPECTED  Note: Patient is able to make needs known.     Problem: Safety  Goal: Will remain free from injury  Outcome: PROGRESSING AS EXPECTED  Note: Patient has a sitter for safety due to SI and HI.

## 2021-02-02 NOTE — WOUND TEAM
[facility]  Inpatient Services  Initial Wound and Skin Care Evaluation    Admission Date: 2/1/2021     Last order of IP CONSULT TO WOUND CARE was found on 2/1/2021 from Hospital Encounter on 2/1/2021     HPI, PMH, SH: Reviewed    Past Surgical History:   Procedure Laterality Date   • COLECTOMY N/A 11/10/2020    Procedure: COLECTOMY-SEGMENTAL, COLOSTOMY, MOBILIZATION OF SPLENIC FLEXURE, WOUND VAC PLACEMENT, IRRIGATION AND DEBRIDMENT FLANK WOUND;  Surgeon: Kin Desouza D.O.;  Location: [facility];  Service: General   • ZZZ CARDIAC CATH  07/21/2018    EF 45%, normal coronaries.   • IRRIGATION & DEBRIDEMENT ORTHO Right 2/19/2018    Procedure: IRRIGATION & DEBRIDEMENT ORTHO-FOOT;  Surgeon: Kirby Lopez M.D.;  Location: [facility];  Service: Orthopedics   • TOE AMPUTATION Right 1/17/2018    Procedure: TOE AMPUTATION-1ST RAY;  Surgeon: Doug Delong M.D.;  Location: [facility];  Service: Orthopedics   • TOE AMPUTATION Right 11/10/2017    Procedure: TOE AMPUTATION;  Surgeon: Osorio Leo M.D.;  Location: [facility];  Service: Orthopedics     Social History     Tobacco Use   • Smoking status: Never Smoker   • Smokeless tobacco: Never Used   Substance Use Topics   • Alcohol use: No     Chief Complaint   Patient presents with   • Suicidal Ideation     reports having thoughts of killing himself over the past 2 days.      Diagnosis: Suicidal ideation [R45.851]    Unit where seen by Wound Team: S635/00     WOUND CONSULT/FOLLOW UP RELATED TO:  ABD and ostomy      WOUND HISTORY:  Patient with extended hospital stay in November.  Had drain placed to left ABD and perf to bowel requiring open ABD and transverse colostomy.       WOUND ASSESSMENT/LDA  Wound 11/10/20 Full Thickness Wound Abdomen Midline resolving open surgical  (Active)   Wound Image      Site Assessment Pink;Red    Periwound Assessment Dry;Intact    Margins Attached edges    Closure Secondary  intention    Drainage Amount Small    Drainage Description Serosanguineous    Treatments Cleansed;Site care    Wound Cleansing Normal Saline Irrigation    Periwound Protectant Skin Protectant Wipes to Periwound    Dressing Cleansing/Solutions Not Applicable    Dressing Options Hydrofera Blue Ready;Silicone Adhesive Foam    Dressing Changed New    Dressing Status Clean;Dry;Intact    Dressing Change/Treatment Frequency Every 72 hrs, and As Needed    NEXT Dressing Change/Treatment Date 02/05/21    NEXT Weekly Photo (Inpatient Only) 02/02/21    Number of Staples Removed 10    Non-staged Wound Description Full thickness    Wound Length (cm) 3 cm    Wound Width (cm) 1 cm    Wound Depth (cm) 0.1 cm    Wound Surface Area (cm^2) 3 cm^2    Wound Volume (cm^3) 0.3 cm^3    Wound Healing % 100    Wound Bed Granulation (%) 80 %    Tunneling (cm) 0 cm    Tunneling Clock Position of Wound 12    Undermining (cm) 0 cm    Shape [place]    Wound Odor None    Exposed Structures None    WOUND NURSE ONLY - Time Spent with Patient (mins) 60    Number of days: 84     Vascular:    SHAYAN:   No results found.    Lab Values:    Lab Results   Component Value Date/Time    WBC 8.7 02/01/2021 02:40 PM    RBC 4.40 (L) 02/01/2021 02:40 PM    HEMOGLOBIN 12.8 (L) 02/01/2021 02:40 PM    HEMATOCRIT 39.7 (L) 02/01/2021 02:40 PM    CREACTPROT 7.52 (H) 11/29/2020 01:25 AM    SEDRATEWES 13 10/23/2018 08:02 AM    HBA1C 7.8 (H) 11/10/2020 03:31 AM        Culture Results show:  No results found for this or any previous visit (from the past 720 hour(s)).    Pain Level/Medicated:  No complaints of pain.        INTERVENTIONS BY WOUND TEAM:  Chart and images reviewed. Discussed with bedside RN. This RN in to assess patient. Performed standard wound care which includes appropriate positioning, dressing removal and non-selective debridement.   Preparation for Dressing removal: NA  Cleansed with:  NS and gauze.  Sharp debridement: NA  Joya wound: Cleansed with NS,  Prepped with no sting skin prep   Primary Dressing: hydrofera blue   Secondary (Outer) Dressing: adhesive foam.     Interdisciplinary consultation: Patient, Bedside RN    EVALUATION / RATIONALE FOR TREATMENT:  Most Recent Date:  02/02/21: wound nearly resolved.  hydrofera blue to absorb and provide antimicrobial.          Colostomy just changed by RN, additional supplies left in room.  Appliance intact and stoma red and intact.       Goals: Steady decrease in wound area and depth weekly.    WOUND TEAM PLAN OF CARE ([X] for frequency of wound follow up,):   Nursing to follow orders written for wound care. Contact wound team if area fails to progress, deteriorates or with any questions/concerns  Dressing changes by wound team:                   Follow up 3 times weekly:                NPWT change 3 times weekly:     Follow up 1-2 times weekly:      Follow up Bi-Monthly:                   Follow up as needed:   X  Other (explain):     NURSING PLAN OF CARE ORDERS (X):  Dressing changes: See Dressing Care orders: X  Skin care: See Skin Care orders:   RN Prevention Protocol: X  Rectal tube care: See Rectal Tube Care orders:   Other orders:    RSKIN:   CURRENTLY IN PLACE (X), APPLIED THIS VISIT (A), ORDERED (O):   Q shift Michael:  X  Q shift pressure point assessments:  X    Surface/Positioning   Pressure redistribution mattress            Low Airloss          Bariatric foam      Bariatric DARYL     Waffle cushion        Waffle Overlay        X moving self   Reposition q 2 hours      TAPs Turning system     Z Teja Pillow     Offloading/Redistribution per nursing   Sacral Mepilex (Silicone dressing)     Heel Mepilex (Silicone dressing)         Heel float boots (Prevalon boot)             Float Heels off Bed with Pillows           Respiratory   Silicone O2 tubing         Gray Foam Ear protectors     Cannula fixation Device (Tender )          High flow offloading Clip    Elastic head band offloading device      Anchorfast                                                          Trach with Optifoam split foam             Containment/Moisture Prevention     Rectal tube or BMS    Purwick/Condom Cath        Zabala Catheter    Barrier wipes           Barrier paste       Antifungal tx      Interdry        Mobilization       Up to chair        Ambulate      PT/OT      Nutrition       Dietician        Diabetes Education      PO     TF     TPN     NPO   # days     Other        Anticipated discharge plans: pending group home acceptance.   LTACH:        SNF/Rehab:                  Home Health Care:           Outpatient Wound Center:            Self/Family Care:        Other:

## 2021-02-02 NOTE — ED NOTES
Pt medicated as ordered. Reoriented to situatuon. Explained criteria for restraints to be removed.

## 2021-02-02 NOTE — PROGRESS NOTES
Received report from ER nurse. Patient brought to floor and he got up and walked to bed and got into bed on his own. Patient continues to endorse suicidal ideation with a plan and homicidal ideation without a plan. Patient has a 1:1 sitter and is on a hold. Patient reports being [facility] and wanting a  to come visit. Admission assessments completed. Mepilex to sacrum for scratch like area to left buttock. Bed in locked and lowest position, bed alarm activated.

## 2021-02-02 NOTE — ASSESSMENT & PLAN NOTE
Monitor with vitals.  Digoxin level within normal limits.  CTA head/neck w/o sign of stroke  Continue digoxin and diltiazem with parameters

## 2021-02-02 NOTE — ASSESSMENT & PLAN NOTE
Patient's had chronic colostomy bag.  Patient changes it on his own and stated he does not really require assistance with this task.  Colostomy care as per nursing protocol

## 2021-02-03 ENCOUNTER — APPOINTMENT (OUTPATIENT)
Dept: RADIOLOGY | Facility: MEDICAL CENTER | Age: 56
DRG: 880 | End: 2021-02-03
Attending: HOSPITALIST
Payer: MEDICAID

## 2021-02-03 LAB
ALBUMIN SERPL BCP-MCNC: 3.2 G/DL (ref 3.2–4.9)
AMPHET UR QL SCN: NEGATIVE
BARBITURATES UR QL SCN: NEGATIVE
BENZODIAZ UR QL SCN: NEGATIVE
BUN SERPL-MCNC: 16 MG/DL (ref 8–22)
BZE UR QL SCN: NEGATIVE
CALCIUM SERPL-MCNC: 9 MG/DL (ref 8.5–10.5)
CANNABINOIDS UR QL SCN: NEGATIVE
CHLORIDE SERPL-SCNC: 102 MMOL/L (ref 96–112)
CO2 SERPL-SCNC: 24 MMOL/L (ref 20–33)
CREAT SERPL-MCNC: 1.21 MG/DL (ref 0.5–1.4)
EKG IMPRESSION: NORMAL
ERYTHROCYTE [DISTWIDTH] IN BLOOD BY AUTOMATED COUNT: 47 FL (ref 35.9–50)
GLUCOSE BLD-MCNC: 112 MG/DL (ref 65–99)
GLUCOSE SERPL-MCNC: 110 MG/DL (ref 65–99)
HCT VFR BLD AUTO: 37.5 % (ref 42–52)
HGB BLD-MCNC: 12.4 G/DL (ref 14–18)
MAGNESIUM SERPL-MCNC: 1.8 MG/DL (ref 1.5–2.5)
MCH RBC QN AUTO: 29.7 PG (ref 27–33)
MCHC RBC AUTO-ENTMCNC: 33.1 G/DL (ref 33.7–35.3)
MCV RBC AUTO: 89.7 FL (ref 81.4–97.8)
METHADONE UR QL SCN: NEGATIVE
OPIATES UR QL SCN: NEGATIVE
OXYCODONE UR QL SCN: NEGATIVE
PCP UR QL SCN: NEGATIVE
PHOSPHATE SERPL-MCNC: 4 MG/DL (ref 2.5–4.5)
PLATELET # BLD AUTO: 193 K/UL (ref 164–446)
PMV BLD AUTO: 10 FL (ref 9–12.9)
POTASSIUM SERPL-SCNC: 3.8 MMOL/L (ref 3.6–5.5)
PROPOXYPH UR QL SCN: NEGATIVE
RBC # BLD AUTO: 4.18 M/UL (ref 4.7–6.1)
SODIUM SERPL-SCNC: 135 MMOL/L (ref 135–145)
WBC # BLD AUTO: 7.8 K/UL (ref 4.8–10.8)

## 2021-02-03 PROCEDURE — 80307 DRUG TEST PRSMV CHEM ANLYZR: CPT

## 2021-02-03 PROCEDURE — 83735 ASSAY OF MAGNESIUM: CPT

## 2021-02-03 PROCEDURE — 85027 COMPLETE CBC AUTOMATED: CPT

## 2021-02-03 PROCEDURE — 36415 COLL VENOUS BLD VENIPUNCTURE: CPT

## 2021-02-03 PROCEDURE — 700102 HCHG RX REV CODE 250 W/ 637 OVERRIDE(OP): Performed by: INTERNAL MEDICINE

## 2021-02-03 PROCEDURE — 700111 HCHG RX REV CODE 636 W/ 250 OVERRIDE (IP): Performed by: HOSPITALIST

## 2021-02-03 PROCEDURE — 93010 ELECTROCARDIOGRAM REPORT: CPT | Performed by: INTERNAL MEDICINE

## 2021-02-03 PROCEDURE — 80069 RENAL FUNCTION PANEL: CPT

## 2021-02-03 PROCEDURE — 82962 GLUCOSE BLOOD TEST: CPT

## 2021-02-03 PROCEDURE — A9270 NON-COVERED ITEM OR SERVICE: HCPCS | Performed by: INTERNAL MEDICINE

## 2021-02-03 PROCEDURE — 700102 HCHG RX REV CODE 250 W/ 637 OVERRIDE(OP): Performed by: HOSPITALIST

## 2021-02-03 PROCEDURE — 700102 HCHG RX REV CODE 250 W/ 637 OVERRIDE(OP): Performed by: PSYCHIATRY & NEUROLOGY

## 2021-02-03 PROCEDURE — 99232 SBSQ HOSP IP/OBS MODERATE 35: CPT | Performed by: INTERNAL MEDICINE

## 2021-02-03 PROCEDURE — A9270 NON-COVERED ITEM OR SERVICE: HCPCS | Performed by: PSYCHIATRY & NEUROLOGY

## 2021-02-03 PROCEDURE — 70551 MRI BRAIN STEM W/O DYE: CPT

## 2021-02-03 PROCEDURE — A9270 NON-COVERED ITEM OR SERVICE: HCPCS | Performed by: HOSPITALIST

## 2021-02-03 PROCEDURE — 770001 HCHG ROOM/CARE - MED/SURG/GYN PRIV*

## 2021-02-03 RX ORDER — UREA 10 %
500 LOTION (ML) TOPICAL
Status: DISCONTINUED | OUTPATIENT
Start: 2021-02-03 | End: 2021-02-05 | Stop reason: HOSPADM

## 2021-02-03 RX ADMIN — ENOXAPARIN SODIUM 40 MG: 40 INJECTION SUBCUTANEOUS at 04:49

## 2021-02-03 RX ADMIN — RISPERIDONE 2 MG: 2 TABLET ORAL at 17:54

## 2021-02-03 RX ADMIN — FLUDROCORTISONE ACETATE 0.1 MG: 0.1 TABLET ORAL at 04:48

## 2021-02-03 RX ADMIN — LORATADINE 10 MG: 10 TABLET ORAL at 04:48

## 2021-02-03 RX ADMIN — MODAFINIL 100 MG: 100 TABLET ORAL at 04:49

## 2021-02-03 RX ADMIN — RISPERIDONE 2 MG: 2 TABLET ORAL at 04:49

## 2021-02-03 RX ADMIN — DIGOXIN 125 MCG: 125 TABLET ORAL at 04:49

## 2021-02-03 RX ADMIN — TAMSULOSIN HYDROCHLORIDE 0.4 MG: 0.4 CAPSULE ORAL at 08:48

## 2021-02-03 RX ADMIN — ASPIRIN 325 MG: 325 TABLET, FILM COATED ORAL at 04:48

## 2021-02-03 RX ADMIN — INSULIN GLARGINE 15 UNITS: 100 INJECTION, SOLUTION SUBCUTANEOUS at 04:43

## 2021-02-03 RX ADMIN — LEVOTHYROXINE SODIUM 112 MCG: 0.11 TABLET ORAL at 04:49

## 2021-02-03 RX ADMIN — ATORVASTATIN CALCIUM 40 MG: 40 TABLET, FILM COATED ORAL at 17:54

## 2021-02-03 RX ADMIN — DILTIAZEM HYDROCHLORIDE 360 MG: 180 CAPSULE, COATED, EXTENDED RELEASE ORAL at 04:55

## 2021-02-03 RX ADMIN — Medication 500 MG: at 17:54

## 2021-02-03 RX ADMIN — FUROSEMIDE 20 MG: 20 TABLET ORAL at 04:49

## 2021-02-03 ASSESSMENT — ENCOUNTER SYMPTOMS
BACK PAIN: 0
DIZZINESS: 0
DEPRESSION: 1
WEAKNESS: 1
ABDOMINAL PAIN: 0
CONSTIPATION: 0
INSOMNIA: 0
HEADACHES: 0
COUGH: 0
VOMITING: 0
SHORTNESS OF BREATH: 0
FEVER: 0
HALLUCINATIONS: 1
NAUSEA: 0
DIARRHEA: 0
PALPITATIONS: 0

## 2021-02-03 ASSESSMENT — PAIN DESCRIPTION - PAIN TYPE: TYPE: ACUTE PAIN

## 2021-02-03 NOTE — DISCHARGE PLANNING
RN BRANDON spoke with patient at beside. Informed patient that [facility] does have all his belongings and they will bring his belongings tomorrow. Bedside RN updated via Voalte.

## 2021-02-03 NOTE — THERAPY
"Occupational Therapy   Initial Evaluation     Patient Name: Sebastián Castro  Age:  55 y.o., Sex:  male  Medical Record #: [id number]  Today's Date: 2/2/2021       Precautions: Fall Risk    Assessment  Patient is 55 y.o. male with a diagnosis of diabetes, atrial fibrillation, obesity, CHF, DLD, who presented 2/1/2021 with suicidal ideations..  Additional factors influencing patient status / progress: Pt reports being in the hospital in December & had to have a colostomy.  Today pt was pleasant & co-operative & eager to engage in ADL's.  Pt had mild LOB in standing at the sink & had had difficulty with sit-stand from low surfaces.  Suspect pt is likely functioning close to his baseline.    OT will see for 1-2 more visits.    Plan    Recommend Occupational Therapy 2 times per week until therapy goals are met for the following treatments:  Adaptive Equipment, Neuro Re-Education / Balance, Self Care/Activities of Daily Living, Therapeutic Activities and Therapeutic Exercises.    DC Equipment Recommendations: Unable to determine at this time  Discharge Recommendations: Recommend home health for continued occupational therapy services     Subjective    \"I don't have any clothes to go home in\"     Objective       02/02/21 1424   Prior Living Situation   Prior Services Continuous (24 Hour) Care Giving Per Service   Housing / Facility Skilled Nursing Facility   Equipment Owned Front-Wheel Walker   Lives with - Patient's Self Care Capacity Unrelated Adult   Comments Pt reports he was living at a group home prior to his last hospitalization.  Pt was currently at SNF undergoing Rehab.  Pt's plan is to D/C to another Group home   Cognition    Comments pleasant, co-operative & eager to regain his independence   Balance Assessment   Sitting Balance (Static) Good   Sitting Balance (Dynamic) Good   Standing Balance (Static) Fair +   Standing Balance (Dynamic) Fair -   Weight Shift Sitting Good   Weight Shift Standing Fair   Comments pt " "has tendancy to lean post in standing   ADL Assessment   Eating Modified Independent   Grooming Minimal Assist;Standing  (pt has tendancy to lean post standing at the sink)   Upper Body Dressing Modified Independent   Lower Body Dressing Supervision   Toileting Supervision   Functional Mobility   Sit to Stand Supervised   Bed, Chair, Wheelchair Transfer Minimal Assist   Toilet Transfers Minimal Assist   Mobility pt amb with FWW to sink & bathroom   Comments Pt had difficulty with sit-stand from low surface   Patient / Family Goals   Patient / Family Goal #1 \"I want to get stronger\"   Short Term Goals   Short Term Goal # 1 Pt will be supervised with ADL transfers   Short Term Goal # 2 Pt will shower seated with set up                 "

## 2021-02-03 NOTE — CARE PLAN
Problem: Safety  Goal: Will remain free from injury  Outcome: PROGRESSING AS EXPECTED  Intervention: Provide assistance with mobility  Note: Bed locked in lowest position, upper rails raised, sitter at bedside, clutter free environment     Problem: Skin Integrity  Goal: Risk for impaired skin integrity will decrease  Outcome: PROGRESSING AS EXPECTED  Intervention: Assess risk factors for impaired skin integrity and/or pressure ulcers  Note: 2RN skin check, q2 turns, pillows in use for support, pt wearing heel float boots, mepilex applied to bilateral heels, interdry to pannus, barrier cream applied, incontinence care provided

## 2021-02-03 NOTE — PROGRESS NOTES
Pt A&Ox4 on RA, denies any pain. No signs of acute distress observed. Pt curently sleeping, 1:1 sitter at bedside. Bed locked in lowest position, upper rails raised, treaded sock on. Will continue with hourly rounding.

## 2021-02-03 NOTE — PROGRESS NOTES
"Hospital Medicine Daily Progress Note    Date of Service  2021    Chief Complaint  55 y.o. male admitted 2021 with suicidal ideation    Hospital Course  55M PMH ATRIAL fibrillation, T2DM, obesity, CHF, dyslipidemia presenting 2021 for suicidal ideations.  Patient was at [facility] for skilled nursing, where he started becoming worsening on depression and having auditory hallucinations.  Patient mention his auditory hallucinations have happened 6 months ago before.  He states he hears multiple voices.  He does have suicidal ideation by taking any of his medications or \"cutting his wrists.  He does feel depressed after his son  couple months ago from COVID-19.  Patient feels depressed also given his medical condition, weakness in his legs he feels is not improving and feels that he is better off if he was dead.    Interval Problem Update  Patient seen and evaluated today.  Patient stated he had continued suicidal ideations, auditory hallucinations with multiple voices.  Patient stated he was getting help outside but he felt his medications were not working.  Patient stressors are listed above.  Patient was to be evaluated by psychiatry.  Patient is on a legal hold.    Consultants/Specialty  Psychiatry    Code Status  Full Code    Disposition  To be determined    Review of Systems  Review of Systems   Constitutional: Negative for fever and malaise/fatigue.   Respiratory: Negative for cough and shortness of breath.    Cardiovascular: Negative for chest pain and palpitations.   Gastrointestinal: Negative for abdominal pain, constipation, diarrhea, nausea and vomiting.   Musculoskeletal: Negative for back pain and joint pain.   Neurological: Positive for weakness (Generalized, BLE). Negative for dizziness and headaches.   Psychiatric/Behavioral: Positive for depression, hallucinations (Auditory multiple voices) and suicidal ideas. The patient has insomnia.         Physical Exam  Temp:  [36.4 " °C (97.6 °F)-36.9 °C (98.4 °F)] 36.5 °C (97.7 °F)  Pulse:  [] 91  Resp:  [17-20] 17  BP: (128-140)/(70-79) 128/77  SpO2:  [96 %-98 %] 96 %    Physical Exam  Vitals signs and nursing note reviewed.   Constitutional:       General: He is not in acute distress.     Appearance: Normal appearance. He is obese.   Cardiovascular:      Rate and Rhythm: Normal rate and regular rhythm.      Pulses: Normal pulses.      Heart sounds: Normal heart sounds.   Pulmonary:      Effort: Pulmonary effort is normal. No respiratory distress.      Breath sounds: Normal breath sounds.   Abdominal:      General: Abdomen is flat. Bowel sounds are normal. There is no distension.      Palpations: Abdomen is soft.   Musculoskeletal: Normal range of motion.         General: No swelling.   Skin:     General: Skin is warm.      Capillary Refill: Capillary refill takes less than 2 seconds.      Coloration: Skin is not jaundiced.   Neurological:      General: No focal deficit present.      Mental Status: He is alert and oriented to person, place, and time. Mental status is at baseline.      Motor: Weakness present.   Psychiatric:         Behavior: Behavior normal.      Comments: Patient's mood appears depressed, admitted to suicidal ideations persistent.  Admitted to multiple voices for auditory hallucination.  At this time denied homicidal ideation.       Fluids    Intake/Output Summary (Last 24 hours) at 2/2/2021 9271  Last data filed at 2/2/2021 1157  Gross per 24 hour   Intake --   Output 100 ml   Net -100 ml       Laboratory  Recent Labs     02/01/21  1440   WBC 8.7   RBC 4.40*   HEMOGLOBIN 12.8*   HEMATOCRIT 39.7*   MCV 90.2   MCH 29.1   MCHC 32.2*   RDW 49.3   PLATELETCT 221   MPV 10.8     Recent Labs     02/01/21  1440 02/02/21  0855   SODIUM 135 136   POTASSIUM 4.1 4.1   CHLORIDE 102 101   CO2 21 23   GLUCOSE 133* 120*   BUN 19 16   CREATININE 1.10 1.03   CALCIUM 9.3 9.3     Recent Labs     02/01/21  1440 02/01/21  2109   INR 1.01  1.09               Imaging  CT-CTA HEAD WITH & W/O-POST PROCESS   Final Result      CT angiogram of the [place] within normal limits.      CT-CTA NECK WITH & W/O-POST PROCESSING   Final Result      CT angiogram of the neck notable for some plaque but no significant stenosis, dissection or occlusion      Distal bilateral ICA tortuosity.      US-EXTREMITY VENOUS LOWER UNILAT LEFT   Final Result      MR-BRAIN-W/O    (Results Pending)        Assessment/Plan  Left leg weakness- (present on admission)  Assessment & Plan  CTA head and neck unremarkable.  MRI brain ordered.  ASA, Statin.  PT/OT    Anxiety and depression- (present on admission)  Assessment & Plan  Start zoloft  Psychiatry consult placed.    Hypothyroidism- (present on admission)  Assessment & Plan  Synthroid 112 mcg  Last TSH:15 2 months ago.  TSH 4.75 currently    Adrenal insufficiency (HCC)- (present on admission)  Assessment & Plan  Florinef  May need midodrine if lower BP's    Suicidal ideation- (present on admission)  Assessment & Plan  Consulted Psychiatry  States he is hearing multiple voices  Patient has continued suicidal ideation  Legal Hold    Chronic systolic heart failure (HCC)- (present on admission)  Assessment & Plan  Last Echo showed EF:30%  Check BNP  Has leg edema but denies SOB    Chronic atrial fibrillation- (present on admission)  Assessment & Plan  Monitor with vitals.  Digoxin level within normal limits.  Continue digoxin and diltiazem with parameters  CTA head/neck w/o sign of stroke.    Essential hypertension- (present on admission)  Assessment & Plan  Monitor vitals.    Type 2 diabetes mellitus with hyperglycemia, without long-term current use of insulin ([facility])- (present on admission)  Assessment & Plan  Lantus 15units  Monitor accuchecks and cover with SSI  Last HgbA1c:7.8 in past 3 months    Elevated liver enzymes  Assessment & Plan  Question of obesity and hepatic congestion  Monitor CMP    Colostomy present ([facility])-  (present on admission)  Assessment & Plan  Colostomy care as per nursing protocol    Obesity- (present on admission)  Assessment & Plan  Body mass index is 39.2 kg/m².  Need weight loss and lifestyle modification.     VTE prophylaxis: Lovenox

## 2021-02-03 NOTE — PROGRESS NOTES
2 RN skin check completed with Tabatha Franco RN.   Devices in place: PIV, colostomy.  Skin assessed under devices: yes.  Confirmed pressure ulcers found on: none.  New potential pressure ulcers noted on: left heel. Wound consult placed: yes.    Skin assessment:   Ears intact, pink, blanching  Abdomen wound noted, dressing in place, dressing clean, dry, intact  Colostomy site clean  Pannus, moist, red, slow to swetha  Groin red, moist, slow to swetha  Right heel red, blanching  Right toe amputation, clean, dry  Left heel purple, non blancheable area noted  Sacrum red, blanching, mepilex dressing in place  Back red, blanching    The following interventions in place: 2RN skin check, q2 turns, pillows in use for support, heel float boots placed, mepilex applied to bilateral heels, interdry to pannus, barrier cream applied to groin, baby powder applied, incontinence care provided, linens changed

## 2021-02-03 NOTE — ASSESSMENT & PLAN NOTE
Given psych medications, will need optimized magnesium levels to prevent QTc prolongation  Replace as needed, on PO replacements  Check AM mag levels

## 2021-02-03 NOTE — DISCHARGE PLANNING
Anticipated Discharge Disposition: TBD    Action: RN CM was informed by MD that the patient will need in patient psychiatric hospital per Psychiatry. MD to sign legal hold paperwork once the patient is medically cleared. MD notified via voalte.     RN CM was informed patient may still have belongings at [facility]. RN CM spoke with with Jasvir the [employer] and he confirmed the patient has a duffle bag with his belongings in Jasvir's office. Jasvir's phone number is [phone].      Jasvir stated that the [facility] will be able to bring the patient's belongings to the hospital.     Addendum:    6826  Received call from Aleta RICKS. RN CM was informed that the patient is medically cleared and Dr. Grace will sign legal hold paperwork as soon as he can.     Barriers to Discharge: legal hold, inpatient psychiatric referral and acceptance    Plan: Follow up with medical team and patient

## 2021-02-03 NOTE — DISCHARGE PLANNING
Agency/Facility Name: [facility]  Spoke To: [facility]  Outcome: The patient's belongings at [facility] have been bagged up and [facility] will bring them to the patient tomorrow, 2-04-21.

## 2021-02-03 NOTE — CARE PLAN
Problem: Communication  Goal: The ability to communicate needs accurately and effectively will improve  Outcome: PROGRESSING AS EXPECTED  Intervention: Educate patient and significant other/support system about the plan of care, procedures, treatments, medications and allow for questions  Note: Interventions: Encourage pt to express feelings to nursing staff. Educate pt to escalate needs to 1:1 sitter to alert nursing staff to pt's needs.   Outcome: Will continue to monitor throughout shift.       Problem: Potential for Self Harm  Goal: No active self-harm  Outcome: PROGRESSING AS EXPECTED  Note: Interventions: Assess pt's suicide ideation frequently. Encourage pt to voice feelings of self harm. 1:1 safety sitter at the bedside.   Outcome: Will continue to monitor throughout shift.

## 2021-02-03 NOTE — PROGRESS NOTES
"Hospital Medicine Daily Progress Note    Date of Service  2/3/2021    Chief Complaint  55 y.o. male admitted 2021 with suicidal ideation    Hospital Course  55M PMH ATRIAL fibrillation, T2DM, obesity, CHF, dyslipidemia presenting 2021 for suicidal ideations.  Patient was at [facility] for skilled nursing, where he started becoming worsening on depression and having auditory hallucinations.  Patient mention his auditory hallucinations have happened 6 months ago before.  He states he hears multiple voices.  He does have suicidal ideation by taking any of his medications or \"cutting his wrists.  He does feel depressed after his son  couple months ago from COVID-19.  Patient feels depressed also given his medical condition, weakness in his legs he feels is not improving and feels that he is better off if he was dead.    Interval Problem Update  Patient seen and evaluated today.  Patient stated he had continued suicidal ideations, auditory hallucinations with multiple voices.  Patient stated he was getting help outside but he felt his medications were not working.  Patient stressors are listed above.  Patient was to be evaluated by psychiatry.  Patient is on a legal hold.    Consultants/Specialty  Psychiatry    Code Status  Full Code    Disposition  To be determined, legal hold continued, psychiatry in house following. Medically cleared.    Review of Systems  Review of Systems   Constitutional: Negative for fever and malaise/fatigue.   Respiratory: Negative for cough and shortness of breath.    Cardiovascular: Negative for chest pain and palpitations.   Gastrointestinal: Negative for abdominal pain, constipation, diarrhea, nausea and vomiting.   Musculoskeletal: Negative for back pain and joint pain.   Neurological: Positive for weakness (Generalized, BLE improved). Negative for dizziness and headaches.   Psychiatric/Behavioral: Positive for depression, hallucinations (Auditory multiple voices " less) and suicidal ideas (improved, still present). The patient does not have insomnia.       Physical Exam  Temp:  [36.4 °C (97.5 °F)-36.7 °C (98 °F)] 36.7 °C (98 °F)  Pulse:  [61-91] 68  Resp:  [16-18] 18  BP: (121-142)/(60-94) 133/60  SpO2:  [94 %-96 %] 94 %    Physical Exam  Vitals signs and nursing note reviewed.   Constitutional:       General: He is not in acute distress.     Appearance: Normal appearance. He is obese.   Cardiovascular:      Rate and Rhythm: Normal rate and regular rhythm.      Pulses: Normal pulses.      Heart sounds: Normal heart sounds.   Pulmonary:      Effort: Pulmonary effort is normal. No respiratory distress.      Breath sounds: Normal breath sounds.   Abdominal:      General: Abdomen is flat. Bowel sounds are normal. There is no distension.      Palpations: Abdomen is soft.      Comments: Chronic colostomy bag in place   Musculoskeletal: Normal range of motion.         General: No swelling.   Skin:     General: Skin is warm.      Capillary Refill: Capillary refill takes less than 2 seconds.      Coloration: Skin is not jaundiced.   Neurological:      General: No focal deficit present.      Mental Status: He is alert and oriented to person, place, and time. Mental status is at baseline.      Motor: Weakness present.   Psychiatric:         Mood and Affect: Mood normal.         Behavior: Behavior normal.      Comments: Patient's mood appears improved  suicidal ideations persistent  multiple voices for auditory hallucination less frequent    At this time denied homicidal ideation       Fluids    Intake/Output Summary (Last 24 hours) at 2/3/2021 1319  Last data filed at 2/3/2021 1300  Gross per 24 hour   Intake 1160 ml   Output 1750 ml   Net -590 ml       Laboratory  Recent Labs     02/01/21  1440 02/03/21  0100   WBC 8.7 7.8   RBC 4.40* 4.18*   HEMOGLOBIN 12.8* 12.4*   HEMATOCRIT 39.7* 37.5*   MCV 90.2 89.7   MCH 29.1 29.7   MCHC 32.2* 33.1*   RDW 49.3 47.0   PLATELETCT 221 193   MPV  10.8 10.0     Recent Labs     02/01/21  1440 02/02/21  0855 02/03/21  0100   SODIUM 135 136 135   POTASSIUM 4.1 4.1 3.8   CHLORIDE 102 101 102   CO2 21 23 24   GLUCOSE 133* 120* 110*   BUN 19 16 16   CREATININE 1.10 1.03 1.21   CALCIUM 9.3 9.3 9.0     Recent Labs     02/01/21  1440 02/01/21  2109   INR 1.01 1.09               Imaging  MR-BRAIN-W/O   Final Result      1.  No evidence of acute territorial infarct, intracranial hemorrhage or mass lesion.   2.  Mild diffuse cerebral substance loss.   3.  Mild microangiopathic ischemic change versus demyelination or gliosis.   4.  Findings of sinusitis as described above.      CT-CTA HEAD WITH & W/O-POST PROCESS   Final Result      CT angiogram of the [place] within normal limits.      CT-CTA NECK WITH & W/O-POST PROCESSING   Final Result      CT angiogram of the neck notable for some plaque but no significant stenosis, dissection or occlusion      Distal bilateral ICA tortuosity.      US-EXTREMITY VENOUS LOWER UNILAT LEFT   Final Result           Assessment/Plan  * Suicidal ideation- (present on admission)  Assessment & Plan  States he is hearing multiple voices  Patient has continued suicidal ideation  Consulted Psychiatry, recommendations made for patient  Continue Modafinil and Risperdal  Discontinued Sertaline  Legal Hold continued  Patient recommended for inpatient pyschiatric facility  Medically cleared    Left leg weakness- (present on admission)  Assessment & Plan  CTA head and neck and MRI brain unremarkable.  ASA, Statin.  PT/OT    Anxiety and depression- (present on admission)  Assessment & Plan  Start zoloft  Psychiatry consult placed.    Hypothyroidism- (present on admission)  Assessment & Plan  Synthroid 112 mcg  Last TSH:15 2 months ago.  TSH 4.75 currently    Adrenal insufficiency (HCC)- (present on admission)  Assessment & Plan  Florinef  May need midodrine if lower BP's    Chronic systolic heart failure (HCC)- (present on  admission)  Assessment & Plan  Last Echo showed EF:30%  Check BNP  Has leg edema but denies SOB    Chronic atrial fibrillation- (present on admission)  Assessment & Plan  Monitor with vitals.  Digoxin level within normal limits.  Continue digoxin and diltiazem with parameters  CTA head/neck w/o sign of stroke.    Essential hypertension- (present on admission)  Assessment & Plan  Monitor vitals.    Type 2 diabetes mellitus with hyperglycemia, without long-term current use of insulin (HCC)- (present on admission)  Assessment & Plan  Lantus 15units  Monitor accuchecks and cover with SSI  Last HgbA1c:7.8 in past 3 months    Hypomagnesemia- (present on admission)  Assessment & Plan  Given psych medications, will need optimized magnesium levels to prevent QTc prolongation  Replace as needed, on PO replacements  Check AM mag levels    Elevated liver enzymes- (present on admission)  Assessment & Plan  Question of obesity and hepatic congestion  Monitor CMP    Colostomy present (HCC)- (present on admission)  Assessment & Plan  Colostomy care as per nursing protocol    Obesity- (present on admission)  Assessment & Plan  Body mass index is 39.2 kg/m².  Need weight loss and lifestyle modification.     VTE prophylaxis: Lovenox

## 2021-02-03 NOTE — PROGRESS NOTES
Pt left unit for MRI via gurney at 0130 accompanied by sitter.    Pt returned to unit from MRI at 0215 via MANISHA gardner. No signs of acute distress observed. Sitter at bedside.

## 2021-02-03 NOTE — PROGRESS NOTES
"Received report from NOC RN and assumed care of pt. Pt is A&Ox4 resting in the chair on room air. Pt reports no pain or discomfort. Pt reports feelings of SI stating, \"the voices are telling me to hurt myself.\" POC discussed with pt. No other needs at this time. 1:1 CNA safety sitter at the bedside. All items removed from room and place outside.   "

## 2021-02-03 NOTE — WOUND TEAM
[facility]  Inpatient Services  Wound and Skin Care Progress Note    Admission Date: 2/1/2021     Last order of IP CONSULT TO WOUND CARE was found on 2/1/2021 from Hospital Encounter on 2/1/2021     HPI, PMH, SH: Reviewed    Past Surgical History:   Procedure Laterality Date   • COLECTOMY N/A 11/10/2020    Procedure: COLECTOMY-SEGMENTAL, COLOSTOMY, MOBILIZATION OF SPLENIC FLEXURE, WOUND VAC PLACEMENT, IRRIGATION AND DEBRIDMENT FLANK WOUND;  Surgeon: Kin Desouza D.O.;  Location: [facility];  Service: General   • ZZZ CARDIAC CATH  07/21/2018    EF 45%, normal coronaries.   • IRRIGATION & DEBRIDEMENT ORTHO Right 2/19/2018    Procedure: IRRIGATION & DEBRIDEMENT ORTHO-FOOT;  Surgeon: Kirby Lopez M.D.;  Location: [facility];  Service: Orthopedics   • TOE AMPUTATION Right 1/17/2018    Procedure: TOE AMPUTATION-1ST RAY;  Surgeon: Doug Delong M.D.;  Location: [facility];  Service: Orthopedics   • TOE AMPUTATION Right 11/10/2017    Procedure: TOE AMPUTATION;  Surgeon: Osorio Leo M.D.;  Location: [facility];  Service: Orthopedics     Social History     Tobacco Use   • Smoking status: Never Smoker   • Smokeless tobacco: Never Used   Substance Use Topics   • Alcohol use: No     Chief Complaint   Patient presents with   • Suicidal Ideation     reports having thoughts of killing himself over the past 2 days.      Diagnosis: Suicidal ideation [R45.851]    Unit where seen by Wound Team: S635/00     WOUND CONSULT/FOLLOW UP RELATED TO: Left heel pressure injury    WOUND HISTORY:  Patient with extended hospital stay in November.  Had drain placed to left ABD and perf to bowel requiring open ABD and transverse colostomy.       WOUND ASSESSMENT/LDA    Wound 02/02/21 Pressure Injury Heel Left (Active)   Wound Image    02/03/21 1300   Site Assessment Purple;Red 02/03/21 1300   Periwound Assessment Blanchable erythema;Non-blanchable erythema 02/03/21 1300      Margins Defined edges;VON 02/03/21 1300   Closure Open to air 02/03/21 1300   Drainage Amount None 02/03/21 1300   Treatments Offloading 02/03/21 1300   Wound Cleansing Not Applicable 02/03/21 1300   Periwound Protectant Not Applicable 02/03/21 1300   Dressing Cleansing/Solutions Not Applicable 02/03/21 1300   Dressing Options Mepilex Heel 02/03/21 1300   Dressing Changed Reinforced 02/03/21 1300   Dressing Status Clean;Dry;Intact 02/03/21 1300   Dressing Change/Treatment Frequency Every 72 hrs, and As Needed 02/03/21 1300   NEXT Dressing Change/Treatment Date 02/05/21 02/03/21 1300   NEXT Weekly Photo (Inpatient Only) 02/10/21 02/03/21 1300   Pressure Injury Stage DTPI 02/03/21 1300   Non-staged Wound Description Not applicable 02/03/21 1300   Wound Length (cm) 1 cm 02/03/21 1300   Wound Width (cm) 0.3 cm 02/03/21 1300   Wound Surface Area (cm^2) 0.3 cm^2 02/03/21 1300   Shape linear 02/03/21 1300   Wound Odor None 02/03/21 1300   Exposed Structures VON 02/03/21 1300   WOUND NURSE ONLY - Time Spent with Patient (mins) 45 02/03/21 1300          Vascular:    SHAYAN:   No results found.    Lab Values:    Lab Results   Component Value Date/Time    WBC 7.8 02/03/2021 01:00 AM    RBC 4.18 (L) 02/03/2021 01:00 AM    HEMOGLOBIN 12.4 (L) 02/03/2021 01:00 AM    HEMATOCRIT 37.5 (L) 02/03/2021 01:00 AM    CREACTPROT 7.52 (H) 11/29/2020 01:25 AM    SEDRATEWES 13 10/23/2018 08:02 AM    HBA1C 7.8 (H) 11/10/2020 03:31 AM        Culture Results show:  No results found for this or any previous visit (from the past 720 hour(s)).    Pain Level/Medicated:  No complaints of pain.        INTERVENTIONS BY WOUND TEAM:  Chart and images reviewed. Discussed with bedside RN. This RN in to assess patient. Performed standard wound care which includes appropriate positioning, dressing removal and non-selective debridement.   Preparation for Dressing removal: NA  Cleansed with:  NS and gauze.  Sharp debridement: NA  Joya wound: Cleansed with NS,  Prepped with no sting skin prep   Primary Dressing: mepilex heel  Secondary (Outer) Dressing:    Interdisciplinary consultation: Patient, Bedside RN    EVALUATION / RATIONALE FOR TREATMENT:  Most Recent Date:  02/03/21: Left plantar heel POA pressure injury likely caused by his wheelchair footing piece.  Offloading pressure with mepilex heel.   02/02/21: wound nearly resolved.  hydrofera blue to absorb and provide antimicrobial.        Colostomy just changed by RN, additional supplies left in room.  Appliance intact and stoma red and intact.       Goals: Steady decrease in wound area and depth weekly.    WOUND TEAM PLAN OF CARE ([X] for frequency of wound follow up,):   Nursing to follow orders written for wound care. Contact wound team if area fails to progress, deteriorates or with any questions/concerns  Dressing changes by wound team:                   Follow up 3 times weekly:                NPWT change 3 times weekly:     Follow up 1-2 times weekly:      Follow up Bi-Monthly:                   Follow up as needed:   X  Other (explain):     NURSING PLAN OF CARE ORDERS (X):  Dressing changes: See Dressing Care orders:   Skin care: See Skin Care orders:   RN Prevention Protocol: X  Rectal tube care: See Rectal Tube Care orders:   Other orders:    Anticipated discharge plans: pending group home acceptance.   LTACH:        SNF/Rehab:                  Home Health Care:           Outpatient Wound Center:            Self/Family Care:        Other:     Mental Health center per patient.

## 2021-02-04 LAB
ALBUMIN SERPL BCP-MCNC: 3.4 G/DL (ref 3.2–4.9)
BUN SERPL-MCNC: 15 MG/DL (ref 8–22)
CALCIUM SERPL-MCNC: 9.5 MG/DL (ref 8.5–10.5)
CHLORIDE SERPL-SCNC: 101 MMOL/L (ref 96–112)
CO2 SERPL-SCNC: 25 MMOL/L (ref 20–33)
CREAT SERPL-MCNC: 1.1 MG/DL (ref 0.5–1.4)
GLUCOSE BLD-MCNC: 92 MG/DL (ref 65–99)
GLUCOSE SERPL-MCNC: 99 MG/DL (ref 65–99)
MAGNESIUM SERPL-MCNC: 1.8 MG/DL (ref 1.5–2.5)
PHOSPHATE SERPL-MCNC: 4.1 MG/DL (ref 2.5–4.5)
POTASSIUM SERPL-SCNC: 4.1 MMOL/L (ref 3.6–5.5)
SODIUM SERPL-SCNC: 137 MMOL/L (ref 135–145)

## 2021-02-04 PROCEDURE — 770001 HCHG ROOM/CARE - MED/SURG/GYN PRIV*

## 2021-02-04 PROCEDURE — A9270 NON-COVERED ITEM OR SERVICE: HCPCS | Performed by: PSYCHIATRY & NEUROLOGY

## 2021-02-04 PROCEDURE — 99232 SBSQ HOSP IP/OBS MODERATE 35: CPT | Performed by: INTERNAL MEDICINE

## 2021-02-04 PROCEDURE — 83735 ASSAY OF MAGNESIUM: CPT

## 2021-02-04 PROCEDURE — 97535 SELF CARE MNGMENT TRAINING: CPT

## 2021-02-04 PROCEDURE — 700102 HCHG RX REV CODE 250 W/ 637 OVERRIDE(OP): Performed by: HOSPITALIST

## 2021-02-04 PROCEDURE — A9270 NON-COVERED ITEM OR SERVICE: HCPCS | Performed by: INTERNAL MEDICINE

## 2021-02-04 PROCEDURE — A9270 NON-COVERED ITEM OR SERVICE: HCPCS | Performed by: HOSPITALIST

## 2021-02-04 PROCEDURE — 700102 HCHG RX REV CODE 250 W/ 637 OVERRIDE(OP): Performed by: INTERNAL MEDICINE

## 2021-02-04 PROCEDURE — 700111 HCHG RX REV CODE 636 W/ 250 OVERRIDE (IP): Performed by: HOSPITALIST

## 2021-02-04 PROCEDURE — 99231 SBSQ HOSP IP/OBS SF/LOW 25: CPT | Performed by: PSYCHIATRY & NEUROLOGY

## 2021-02-04 PROCEDURE — 97161 PT EVAL LOW COMPLEX 20 MIN: CPT

## 2021-02-04 PROCEDURE — 700102 HCHG RX REV CODE 250 W/ 637 OVERRIDE(OP): Performed by: PSYCHIATRY & NEUROLOGY

## 2021-02-04 PROCEDURE — 80069 RENAL FUNCTION PANEL: CPT

## 2021-02-04 PROCEDURE — 36415 COLL VENOUS BLD VENIPUNCTURE: CPT

## 2021-02-04 PROCEDURE — 302102 BAG OST N IMG 2.25IN 2PC (FECAL): Performed by: INTERNAL MEDICINE

## 2021-02-04 PROCEDURE — 82962 GLUCOSE BLOOD TEST: CPT

## 2021-02-04 RX ADMIN — Medication 500 MG: at 07:36

## 2021-02-04 RX ADMIN — ENOXAPARIN SODIUM 40 MG: 40 INJECTION SUBCUTANEOUS at 04:57

## 2021-02-04 RX ADMIN — RISPERIDONE 2 MG: 2 TABLET ORAL at 17:51

## 2021-02-04 RX ADMIN — LORATADINE 10 MG: 10 TABLET ORAL at 04:57

## 2021-02-04 RX ADMIN — DIGOXIN 125 MCG: 125 TABLET ORAL at 04:57

## 2021-02-04 RX ADMIN — DILTIAZEM HYDROCHLORIDE 360 MG: 180 CAPSULE, COATED, EXTENDED RELEASE ORAL at 04:56

## 2021-02-04 RX ADMIN — FUROSEMIDE 20 MG: 20 TABLET ORAL at 04:57

## 2021-02-04 RX ADMIN — RISPERIDONE 2 MG: 2 TABLET ORAL at 04:57

## 2021-02-04 RX ADMIN — LEVOTHYROXINE SODIUM 112 MCG: 0.11 TABLET ORAL at 04:57

## 2021-02-04 RX ADMIN — Medication 500 MG: at 17:51

## 2021-02-04 RX ADMIN — FLUDROCORTISONE ACETATE 0.1 MG: 0.1 TABLET ORAL at 04:57

## 2021-02-04 RX ADMIN — ASPIRIN 325 MG: 325 TABLET, FILM COATED ORAL at 04:57

## 2021-02-04 RX ADMIN — ATORVASTATIN CALCIUM 40 MG: 40 TABLET, FILM COATED ORAL at 17:51

## 2021-02-04 RX ADMIN — TAMSULOSIN HYDROCHLORIDE 0.4 MG: 0.4 CAPSULE ORAL at 07:36

## 2021-02-04 RX ADMIN — MODAFINIL 100 MG: 100 TABLET ORAL at 04:56

## 2021-02-04 ASSESSMENT — GAIT ASSESSMENTS
DEVIATION: TRENDELENBERG
GAIT LEVEL OF ASSIST: SUPERVISED
DISTANCE (FEET): 200

## 2021-02-04 ASSESSMENT — ENCOUNTER SYMPTOMS
NAUSEA: 0
WEAKNESS: 0
FEVER: 0
HALLUCINATIONS: 1
SORE THROAT: 0
INSOMNIA: 0
DIZZINESS: 0
DEPRESSION: 0
BLURRED VISION: 0
COUGH: 0
ABDOMINAL PAIN: 0
DEPRESSION: 1
NERVOUS/ANXIOUS: 0
PALPITATIONS: 0
BACK PAIN: 0
SHORTNESS OF BREATH: 0
HEADACHES: 0
CONSTIPATION: 0
DIARRHEA: 0
VOMITING: 0
MYALGIAS: 0

## 2021-02-04 ASSESSMENT — COGNITIVE AND FUNCTIONAL STATUS - GENERAL
SUGGESTED CMS G CODE MODIFIER MOBILITY: CH
MOBILITY SCORE: 24
DAILY ACTIVITIY SCORE: 24
SUGGESTED CMS G CODE MODIFIER DAILY ACTIVITY: CH

## 2021-02-04 NOTE — PROGRESS NOTES
"[id number]: Pt AxOx4, anxious, provided emotional report and reassurance. Denied any pain. RA. Denies CP/SOB/N/V. Pt has a 1:1 sitter due to active suicidal ideation. This shift, pt denied SI, reported AH only \"mumbles\"; the voices were not telling him to do anything. Pt cooperative with assessment.  All needs met at this time.  Bed locked and in lowest position, educated on fall risk, will continue to monitor.     "

## 2021-02-04 NOTE — PROGRESS NOTES
Pt AxO x4 at time of assessment, denied any pain.  Pt on RA and tolerating well.  Pt has a 1:1 sitter due to active suicidal ideation.  Pt cooperative with assessment.  All needs met at this time.  Bed locked and in lowest position, educated on fall risk, will continue to monitor.

## 2021-02-04 NOTE — DISCHARGE PLANNING
Behavioral Health referral sent out to all behavioral facilities at [phone].    @[phone]  Legal hold medical clearance faxed to all behavioral facilities via [employer].

## 2021-02-04 NOTE — THERAPY
"Occupational Therapy  Daily Treatment     Patient Name: Sebastián Castro  Age:  55 y.o., Sex:  male  Medical Record #: [id number]  Today's Date: 2/4/2021     Precautions  Precautions: (P) Fall Risk  Comments: (P) suicidal ideations    Assessment    Pt seen for OT tx session. Pt appears to be close to baseline performing BADLs at SPV level. Pt up self in room w/ 1:1 sitter present. Pt reports no concerns regarding BADLs. No further acute OT needs.    Plan    Continue current treatment plan.    DC Equipment Recommendations: (P) Unable to determine at this time  Discharge Recommendations: (P) Anticipate that the patient will have no further occupational therapy needs after discharge from the hospital    Subjective    \"I am not a fall risk\"     Objective       02/04/21 1239   Total Time Spent   Total Time Spent (Mins) 20   Treatment Charges   Charges Yes   OT Self Care / ADL 1   Precautions   Precautions Fall Risk   Comments suicidal ideations   Vitals   O2 (LPM) 0   O2 Delivery Device None - Room Air   Pain 0 - 10 Group   Therapist Pain Assessment Post Activity Pain Same as Prior to Activity;Nurse Notified  (c/o back pain, agreeable)   Cognition    Cognition / Consciousness WDL   Level of Consciousness Alert   Comments very pleasent and cooperative   Balance   Sitting Balance (Static) Good   Sitting Balance (Dynamic) Good   Standing Balance (Static) Fair +   Standing Balance (Dynamic) Fair +   Weight Shift Sitting Good   Weight Shift Standing Good   Skilled Intervention Verbal Cuing   Comments no AD, no LOB   Bed Mobility    Supine to Sit Supervised   Sit to Supine Supervised   Scooting Supervised   Rolling Supervised   Activities of Daily Living   Grooming Supervision;Standing   Upper Body Dressing Supervision   Lower Body Dressing Supervision   Toileting Supervision   Skilled Intervention Verbal Cuing   How much help from another person does the patient currently need...   Putting on and taking off regular lower body " "clothing? 4   Bathing (including washing, rinsing, and drying)? 4   Toileting, which includes using a toilet, bedpan, or urinal? 4   Putting on and taking off regular upper body clothing? 4   Taking care of personal grooming such as brushing teeth? 4   Eating meals? 4   6 Clicks Daily Activity Score 24   Functional Mobility   Sit to Stand Supervised   Bed, Chair, Wheelchair Transfer Supervised   Toilet Transfers   (pt has colostomy, does not need to sit on toilet)   Mobility household distance, no AD   Activity Tolerance   Sitting in Chair 10+ min (up post)   Sitting Edge of Bed NT   Standing 10 min   Patient / Family Goals   Patient / Family Goal #1 \"I want to get stronger\"   Goal #1 Outcome Goal met   Short Term Goals   Short Term Goal # 1 Pt will be supervised with ADL transfers   Goal Outcome # 1 Goal met   Short Term Goal # 2 Pt will shower seated with set up   Goal Outcome # 2 Goal met  (simulated, reports no concerns)   Education Group   Role of Occupational Therapist Patient Response Patient;Acceptance;Explanation;Verbal Demonstration   Anticipated Discharge Equipment and Recommendations   DC Equipment Recommendations Unable to determine at this time   Discharge Recommendations Anticipate that the patient will have no further occupational therapy needs after discharge from the hospital   Interdisciplinary Plan of Care Collaboration   IDT Collaboration with  Nursing   Patient Position at End of Therapy Seated;Tray Table within Reach;Call Light within Reach;Phone within Reach  (1:1 sitter present)   Collaboration Comments report given   Session Information   Date / Session Number  2/4, DC needs only   Priority 0         "

## 2021-02-04 NOTE — DISCHARGE PLANNING
Anticipated Discharge Disposition: TBD    Action: The patient is medically cleared. MD signed medical clearance in patient's legal hold paperwork. Legal hold paperwork faxed to [facility] and [facility] to send out inpatient psychiatry referrals.     Barriers to Discharge: Placement in inpatient phychiatric hospital    Plan: Follow up with medical team

## 2021-02-04 NOTE — PROGRESS NOTES
"Hospital Medicine Daily Progress Note    Date of Service  2021    Chief Complaint  55 y.o. male admitted 2021 with suicidal ideation    Hospital Course  55M PMH ATRIAL fibrillation, T2DM, obesity, CHF, dyslipidemia presenting 2021 for suicidal ideations.  Patient was at [facility] for skilled nursing, where he started becoming worsening on depression and having auditory hallucinations.  Patient mention his auditory hallucinations have happened 6 months ago before.  He states he hears multiple voices.  He does have suicidal ideation by taking any of his medications or \"cutting his wrists.  He does feel depressed after his son  couple months ago from COVID-19.  Patient feels depressed also given his medical condition, weakness in his legs he feels is not improving and feels that he is better off if he was dead.    Interval Problem Update  Patient seen and evaluated today.  Patient stated his auditory hallucinations have not become \"mumbles\".  Patient states they are no longer clear and are no longer violent in nature.  Patient stated his suicidal ideations have diminished to almost completely absent.  Patient feels a bit more optimistic after receiving help at the hospital.    Consultants/Specialty  Psychiatry    Code Status  Full Code    Disposition  To be determined, legal hold continued, psychiatry in house following. Medically cleared for transfer to inpatient psychiatric facility.    Review of Systems  Review of Systems   Constitutional: Negative for fever and malaise/fatigue.   Respiratory: Negative for cough and shortness of breath.    Cardiovascular: Negative for chest pain and palpitations.   Gastrointestinal: Negative for abdominal pain, constipation, diarrhea, nausea and vomiting.   Musculoskeletal: Negative for back pain and joint pain.   Neurological: Negative for dizziness, weakness and headaches.   Psychiatric/Behavioral: Positive for depression (improved significantly), " "hallucinations (auditory voices now mumbles) and suicidal ideas (improved, denied active desire). The patient does not have insomnia.       Physical Exam  Temp:  [36.2 °C (97.1 °F)-36.6 °C (97.8 °F)] 36.2 °C (97.1 °F)  Pulse:  [65-75] 75  Resp:  [16-18] 16  BP: (120-141)/(65-81) 120/71  SpO2:  [93 %-97 %] 97 %    Physical Exam  Vitals signs and nursing note reviewed.   Constitutional:       General: He is not in acute distress.     Appearance: Normal appearance. He is obese.   Cardiovascular:      Rate and Rhythm: Normal rate and regular rhythm.      Pulses: Normal pulses.      Heart sounds: Normal heart sounds.   Pulmonary:      Effort: Pulmonary effort is normal. No respiratory distress.      Breath sounds: Normal breath sounds.   Abdominal:      General: Abdomen is flat. Bowel sounds are normal. There is no distension.      Palpations: Abdomen is soft.      Comments: Chronic colostomy bag in place   Musculoskeletal: Normal range of motion.         General: No swelling.   Skin:     General: Skin is warm.      Capillary Refill: Capillary refill takes less than 2 seconds.      Coloration: Skin is not jaundiced.   Neurological:      General: No focal deficit present.      Mental Status: He is alert and oriented to person, place, and time. Mental status is at baseline.      Motor: No weakness.   Psychiatric:         Mood and Affect: Mood normal.         Behavior: Behavior normal.         Thought Content: Thought content normal.      Comments: Patient's mood appears improved  suicidal ideations diminished  Auditory hallucinations now \"mumbles\", no longer violent nor clear  At this time denied homicidal ideation       Fluids    Intake/Output Summary (Last 24 hours) at 2/4/2021 1432  Last data filed at 2/4/2021 0550  Gross per 24 hour   Intake --   Output 1325 ml   Net -1325 ml       Laboratory  Recent Labs     02/01/21  1440 02/03/21  0100   WBC 8.7 7.8   RBC 4.40* 4.18*   HEMOGLOBIN 12.8* 12.4*   HEMATOCRIT 39.7* 37.5* "   MCV 90.2 89.7   MCH 29.1 29.7   MCHC 32.2* 33.1*   RDW 49.3 47.0   PLATELETCT 221 193   MPV 10.8 10.0     Recent Labs     02/02/21  0855 02/03/21  0100 02/04/21  0040   SODIUM 136 135 137   POTASSIUM 4.1 3.8 4.1   CHLORIDE 101 102 101   CO2 23 24 25   GLUCOSE 120* 110* 99   BUN 16 16 15   CREATININE 1.03 1.21 1.10   CALCIUM 9.3 9.0 9.5     Recent Labs     02/01/21  1440 02/01/21  2109   INR 1.01 1.09               Imaging  MR-BRAIN-W/O   Final Result      1.  No evidence of acute territorial infarct, intracranial hemorrhage or mass lesion.   2.  Mild diffuse cerebral substance loss.   3.  Mild microangiopathic ischemic change versus demyelination or gliosis.   4.  Findings of sinusitis as described above.      CT-CTA HEAD WITH & W/O-POST PROCESS   Final Result      CT angiogram of the [place] within normal limits.      CT-CTA NECK WITH & W/O-POST PROCESSING   Final Result      CT angiogram of the neck notable for some plaque but no significant stenosis, dissection or occlusion      Distal bilateral ICA tortuosity.      US-EXTREMITY VENOUS LOWER UNILAT LEFT   Final Result           Assessment/Plan  * Suicidal ideation- (present on admission)  Assessment & Plan  States he is hearing multiple voices  Patient has continued suicidal ideation  Consulted Psychiatry, recommendations made for patient  Continue Modafinil and Risperdal  Discontinued Sertaline  Legal Hold continued  Patient recommended for inpatient pyschiatric facility  Medically cleared    Left leg weakness- (present on admission)  Assessment & Plan  CTA head and neck and MRI brain unremarkable.  ASA, Statin.  PT/OT  Patient's leg weakness has significantly improved, patient stated he is able to walk better now with minimal assistance.  This may be a component of patient's previous psych issues upon admission, modafinil has significantly helped patient to be more active.    Anxiety and depression- (present on admission)  Assessment & Plan  Patient  was taken off antidepressant medication  Modafinil started by psychiatry, to continue    Hypothyroidism- (present on admission)  Assessment & Plan  Synthroid 112 mcg  Last TSH:15 2 months ago.  TSH 4.75 currently    Adrenal insufficiency (HCC)- (present on admission)  Assessment & Plan  Cristinaf  May need midodrine if lower BP's    Chronic systolic heart failure (HCC)- (present on admission)  Assessment & Plan  Last Echo showed EF:30%  Check BNP  Has leg edema but denies SOB    Chronic atrial fibrillation- (present on admission)  Assessment & Plan  Monitor with vitals.  Digoxin level within normal limits.  CTA head/neck w/o sign of stroke  Continue digoxin and diltiazem with parameters    Essential hypertension- (present on admission)  Assessment & Plan  Monitor vitals.    Type 2 diabetes mellitus with hyperglycemia, without long-term current use of insulin ([facility])- (present on admission)  Assessment & Plan  Lantus 15units  Monitor accuchecks and cover with SSI  Last HgbA1c:7.8 in past 3 months    Hypomagnesemia- (present on admission)  Assessment & Plan  Given psych medications, will need optimized magnesium levels to prevent QTc prolongation  Replace as needed, on PO replacements  Check AM mag levels    Elevated liver enzymes- (present on admission)  Assessment & Plan  Question of obesity and hepatic congestion  Monitor CMP    Colostomy present (HCC)- (present on admission)  Assessment & Plan  Patient's had chronic colostomy bag.  Patient changes it on his own and stated he does not really require assistance with this task.  Colostomy care as per nursing protocol    Obesity- (present on admission)  Assessment & Plan  Body mass index is 39.2 kg/m².  Need weight loss and lifestyle modification.     VTE prophylaxis: Lovenox

## 2021-02-04 NOTE — CARE PLAN
Problem: Communication  Goal: The ability to communicate needs accurately and effectively will improve  Outcome: PROGRESSING AS EXPECTED  Note: Communicates appropriately.      Problem: Safety  Goal: Will remain free from injury  Outcome: PROGRESSING AS EXPECTED  Note: 1:1 sitter in place.

## 2021-02-04 NOTE — CONSULTS
PSYCHIATRIC FOLLOW-UP:(established)  *Reason for admission:   Suicidal ideation, left lower extremity weakness      *Legal Hold Status on Admission: On hold           Chart reviewed.         *HPI: Patient reports his mood is great today and was happy to share that he has been going for walks in the unit.  Reports that he is sleeping and eating well with improvement in his energy and strength.  Patient reports that the auditory hallucinations are improving, and currently only hears mumbles, no command auditory auditory hallucinations.  Patient currently denies active suicidal thoughts or having any intention to harm himself.  He wants help with finding a group home.  Patient is concerned with being homeless, stating that he did not well last time he was in a shelter.  Patient is taking his medications and denies any side effects at this time.  Patient has a payee, her name is Karol and her phone number is [phone].  Asked for  to call her to work on housing.  Patient states he wants to live to see his brother again, find a lady and start a relationship and possibly find a part-time job.  He does not have access to guns.  Currently denies anhedonia and enjoys fishing, reading and doing search word books.  He enjoys going to [employer] stating that it is a happy place for him.  Stated that he can call his brother if he is in an emotional crisis.        Medical ROS (as pertinent):     Review of Systems   Constitutional: Negative for malaise/fatigue.   HENT: Negative for sore throat.    Eyes: Negative for blurred vision.   Respiratory: Negative for shortness of breath.    Cardiovascular: Negative for chest pain.   Gastrointestinal: Negative for abdominal pain, diarrhea, nausea and vomiting.   Genitourinary: Negative for dysuria.   Musculoskeletal: Negative for myalgias.   Skin: Negative for rash.   Neurological: Negative for headaches.        Weakness improving   Psychiatric/Behavioral: Positive for  "hallucinations. Negative for depression and suicidal ideas. The patient is not nervous/anxious and does not have insomnia.         See HPI           *Psychiatric Examination:  Vitals:   Vitals:    02/04/21 0800   BP: 120/71   Pulse: 75   Resp: 16   Temp: 36.2 °C (97.1 °F)   SpO2: 97%       General Appearance: Patient appears older than stated age, sitting on chair eating lunch.  Calm and cooperative with evaluation, good eye contact  Abnormal Movements: None  Gait and Posture: Normal  Speech: Normal volume, tone and rhythm  Thought processes: Linear and goal oriented  Associations: No loose associations  Abnormal or Psychotic Thoughts: Auditory hallucinations improving.  Does not appear to be internally preoccupied or to be responding to internal stimuli  Judgement and Insight: Fair/fair  Orientation: Grossly oriented  Recent and Remote Memory: Intact  Attention Span and Concentration: Intact  Language: Fluid  Fund of Knowledge: Not tested  Mood and Affect:\" I feel great\", euthymic  SI/HI: Denies    Assessment: Patient is currently psychiatric stable on his current regimen.  Currently denies any active or passive SI/HI.  Mostly preoccupied with obtaining housing.  Patient at this time is able to advocate for himself and does not meet criteria for legal hold.  Patient has a low acute risk for danger to himself or others.      Dx:  MDD, recurrent, with psychotic features, improved  History of SIS, stable  History of neurological function no symptoms    Medical (specify new and established dx):  Hypothyroidism  Adrenal insufficiency  Chronic systolic heart failure  Chronic atrial fibrillation  Essential hypertension  Diabetes type 2  Elevated liver enzymes  Colostomy  Obesity      Plan:  1- Legal hold: Discontinued  2- Psychotropic medications: Continue current medications  3-requested  to assist patient with housing  4- Psychiatry signing off. Please re-consult if needed.     Thank you for the consult. "

## 2021-02-04 NOTE — DISCHARGE PLANNING
Agency/Facility Name: [facility]  Spoke To: Cristy  Outcome: Received the referral.  Waiting on the legal hold medical clearance.  Once received, referral will be reviewed and see if they can staff with a MD.    Agency/Facility Name: [facility]  Outcome: Via faxed response.  Declined.  Non-contracted insurance provider.    Agency/Facility Name: [facility]  Spoke To: Adelaide  Outcome: Referral received.  Has not been reviewed yet.    Agency/Facility Name: [facility]  Spoke To: Zenia  Outcome: Referral received.  Declined.  Medical complexity.  Multiple ER visits, colostomy bag, unable to bear weight, and diabetes.      Agency/Facility Name: [facility]  Outcome: Left a message for admissions.  Awaiting a call back.    Agency/Facility Name: [facility]  Spoke To: Rhiannon  Outcome: Declined.  Non-contracted insurance provider.

## 2021-02-04 NOTE — THERAPY
Physical Therapy   Initial Evaluation     Patient Name: Sebastián Castro  Age:  55 y.o., Sex:  male  Medical Record #: [id number]  Today's Date: 2/4/2021     Precautions: (P) Fall Risk    Assessment  Patient is 55 y.o. male presenting with suicidal ideations. Pt demonstrates mild limitations in ambulation with mild right trendelenburg, and brief loss of balance, however was quick to recover. Otherwise, pt demonstrates no limitations in transfers of mobility. Pt does not demonstrate any further needs for acute care physical therapy. Recommend outpatient PT to improve pt balance.    Plan    Recommend Physical Therapy for Evaluation only.     DC Equipment Recommendations: (P) None  Discharge Recommendations: (P) Recommend outpatient physical therapy services to address higher level deficits       Abridged Subjective/Objective       02/04/21 1025   Prior Living Situation   Prior Services Continuous (24 Hour) Care Giving Per Service   Housing / Facility Skilled Nursing Facility   Comments pt reports living in a group home, unsure of where he will be after discharge   Prior Level of Functional Mobility   Bed Mobility Independent   Transfer Status Independent   Ambulation Independent   Distance Ambulation (Feet)   (community)   Assistive Devices Used None   History of Falls   History of Falls Yes   Cognition    Cognition / Consciousness WDL   Level of Consciousness Alert   Comments pleasant and cooperative   Passive ROM Lower Body   Passive ROM Lower Body WDL   Strength Lower Body   Lower Body Strength  X   Comments R LE intact, L great toe extension 3/3, otherwise intact   Sensation Lower Body   Lower Extremity Sensation   X   Comments sensation impaired bilaterally distal to the ankle   Balance Assessment   Sitting Balance (Static) Good   Sitting Balance (Dynamic) Good   Standing Balance (Static) Fair +   Standing Balance (Dynamic) Fair   Weight Shift Sitting Good   Weight Shift Standing Fair   Comments Pt deonstrated brief  LOB while turning in room with quick self recovery   Gait Analysis   Gait Level Of Assist Supervised  (SBA)   Assistive Device None   Distance (Feet) 200   # of Times Distance was Traveled 1   Deviation Trendelenberg  (to right)   Bed Mobility    Comments Found seated   Functional Mobility   Sit to Stand Supervised   Mobility Pt amb w/ out assisted device   How much difficulty does the patient currently have...   Turning over in bed (including adjusting bedclothes, sheets and blankets)? 4   Sitting down on and standing up from a chair with arms (e.g., wheelchair, bedside commode, etc.) 4   Moving from lying on back to sitting on the side of the bed? 4   How much help from another person does the patient currently need...   Moving to and from a bed to a chair (including a wheelchair)? 4   Need to walk in a hospital room? 4   Climbing 3-5 steps with a railing? 4   6 clicks Mobility Score 24   Activity Tolerance   Comments Pt did not demonstrate limitations in sitting or standing during session   Edema / Skin Assessment   Edema / Skin  X   Comments distal lower extremities present with discoloration consistent with diabetic neuropathy

## 2021-02-04 NOTE — CARE PLAN
Problem: Pain Management  Goal: Pain level will decrease to patient's comfort goal  Outcome: PROGRESSING AS EXPECTED   Pt denied any pain     Problem: Skin Integrity  Goal: Risk for impaired skin integrity will decrease  Outcome: PROGRESSING AS EXPECTED   Pt on waffle mattress overlay, prevalon heel float boots, pillows in use for support and positioning

## 2021-02-05 ENCOUNTER — PHARMACY VISIT (OUTPATIENT)
Dept: PHARMACY | Facility: MEDICAL CENTER | Age: 56
End: 2021-02-05
Payer: COMMERCIAL

## 2021-02-05 VITALS
SYSTOLIC BLOOD PRESSURE: 132 MMHG | BODY MASS INDEX: 39.14 KG/M2 | DIASTOLIC BLOOD PRESSURE: 61 MMHG | HEIGHT: 72 IN | RESPIRATION RATE: 18 BRPM | OXYGEN SATURATION: 93 % | TEMPERATURE: 98.1 F | WEIGHT: 289 LBS | HEART RATE: 72 BPM

## 2021-02-05 PROBLEM — R29.898 LEFT LEG WEAKNESS: Status: RESOLVED | Noted: 2021-02-01 | Resolved: 2021-02-05

## 2021-02-05 PROBLEM — E83.42 HYPOMAGNESEMIA: Status: RESOLVED | Noted: 2017-11-15 | Resolved: 2021-02-05

## 2021-02-05 PROBLEM — R45.851 SUICIDAL IDEATION: Status: RESOLVED | Noted: 2019-01-23 | Resolved: 2021-02-05

## 2021-02-05 LAB — GLUCOSE BLD-MCNC: 108 MG/DL (ref 65–99)

## 2021-02-05 PROCEDURE — 82962 GLUCOSE BLOOD TEST: CPT

## 2021-02-05 PROCEDURE — A9270 NON-COVERED ITEM OR SERVICE: HCPCS | Performed by: HOSPITALIST

## 2021-02-05 PROCEDURE — RXMED WILLOW AMBULATORY MEDICATION CHARGE: Performed by: INTERNAL MEDICINE

## 2021-02-05 PROCEDURE — 700102 HCHG RX REV CODE 250 W/ 637 OVERRIDE(OP): Performed by: HOSPITALIST

## 2021-02-05 PROCEDURE — 99239 HOSP IP/OBS DSCHRG MGMT >30: CPT | Performed by: INTERNAL MEDICINE

## 2021-02-05 PROCEDURE — A9270 NON-COVERED ITEM OR SERVICE: HCPCS | Performed by: INTERNAL MEDICINE

## 2021-02-05 PROCEDURE — 302102 BAG OST N IMG 2.25IN 2PC (FECAL): Performed by: INTERNAL MEDICINE

## 2021-02-05 PROCEDURE — 700111 HCHG RX REV CODE 636 W/ 250 OVERRIDE (IP): Performed by: HOSPITALIST

## 2021-02-05 PROCEDURE — 700102 HCHG RX REV CODE 250 W/ 637 OVERRIDE(OP): Performed by: INTERNAL MEDICINE

## 2021-02-05 PROCEDURE — A9270 NON-COVERED ITEM OR SERVICE: HCPCS | Performed by: PSYCHIATRY & NEUROLOGY

## 2021-02-05 PROCEDURE — 700102 HCHG RX REV CODE 250 W/ 637 OVERRIDE(OP): Performed by: PSYCHIATRY & NEUROLOGY

## 2021-02-05 RX ORDER — TAMSULOSIN HYDROCHLORIDE 0.4 MG/1
0.4 CAPSULE ORAL
Qty: 30 CAP | Refills: 0 | Status: ON HOLD | OUTPATIENT
Start: 2021-02-06 | End: 2021-05-19

## 2021-02-05 RX ORDER — FLUDROCORTISONE ACETATE 0.1 MG/1
0.1 TABLET ORAL EVERY MORNING
Qty: 30 TAB | Refills: 0 | Status: ON HOLD | OUTPATIENT
Start: 2021-02-05 | End: 2021-05-19

## 2021-02-05 RX ORDER — INSULIN GLARGINE 100 [IU]/ML
15 INJECTION, SOLUTION SUBCUTANEOUS EVERY EVENING
Qty: 6 ML | Refills: 0 | Status: ON HOLD | OUTPATIENT
Start: 2021-02-05 | End: 2021-05-19

## 2021-02-05 RX ORDER — DILTIAZEM HYDROCHLORIDE 360 MG/1
360 CAPSULE, EXTENDED RELEASE ORAL EVERY MORNING
Qty: 30 CAP | Refills: 0 | Status: ON HOLD | OUTPATIENT
Start: 2021-02-06 | End: 2021-05-19

## 2021-02-05 RX ORDER — FUROSEMIDE 20 MG/1
20 TABLET ORAL DAILY
Qty: 30 TAB | Refills: 0 | Status: ON HOLD | OUTPATIENT
Start: 2021-02-06 | End: 2021-05-19

## 2021-02-05 RX ORDER — LEVOTHYROXINE SODIUM 112 UG/1
112 TABLET ORAL
Qty: 30 TAB | Refills: 0 | Status: ON HOLD | OUTPATIENT
Start: 2021-02-05 | End: 2021-05-19

## 2021-02-05 RX ORDER — RISPERIDONE 2 MG/1
2 TABLET ORAL 2 TIMES DAILY
Qty: 60 TAB | Refills: 2 | Status: ON HOLD | OUTPATIENT
Start: 2021-02-05 | End: 2021-05-19

## 2021-02-05 RX ORDER — DIGOXIN 125 MCG
125 TABLET ORAL DAILY
Qty: 30 TAB | Refills: 0 | Status: ON HOLD | OUTPATIENT
Start: 2021-02-06 | End: 2021-05-19

## 2021-02-05 RX ORDER — MODAFINIL 100 MG/1
100 TABLET ORAL EVERY MORNING
Qty: 30 TAB | Refills: 0 | Status: ON HOLD | OUTPATIENT
Start: 2021-02-06 | End: 2021-05-19

## 2021-02-05 RX ORDER — ATORVASTATIN CALCIUM 40 MG/1
40 TABLET, FILM COATED ORAL EVERY EVENING
Qty: 30 TAB | Refills: 0 | Status: ON HOLD | OUTPATIENT
Start: 2021-02-05 | End: 2021-05-19

## 2021-02-05 RX ORDER — LORATADINE 10 MG/1
10 TABLET ORAL EVERY MORNING
Qty: 30 TAB | Refills: 0 | Status: ON HOLD | OUTPATIENT
Start: 2021-02-05 | End: 2021-05-19

## 2021-02-05 RX ADMIN — DILTIAZEM HYDROCHLORIDE 360 MG: 180 CAPSULE, COATED, EXTENDED RELEASE ORAL at 04:49

## 2021-02-05 RX ADMIN — FLUDROCORTISONE ACETATE 0.1 MG: 0.1 TABLET ORAL at 04:46

## 2021-02-05 RX ADMIN — FUROSEMIDE 20 MG: 20 TABLET ORAL at 04:46

## 2021-02-05 RX ADMIN — RISPERIDONE 2 MG: 2 TABLET ORAL at 04:46

## 2021-02-05 RX ADMIN — INSULIN GLARGINE 15 UNITS: 100 INJECTION, SOLUTION SUBCUTANEOUS at 04:52

## 2021-02-05 RX ADMIN — ENOXAPARIN SODIUM 40 MG: 40 INJECTION SUBCUTANEOUS at 04:45

## 2021-02-05 RX ADMIN — MODAFINIL 100 MG: 100 TABLET ORAL at 04:45

## 2021-02-05 RX ADMIN — ASPIRIN 325 MG: 325 TABLET, FILM COATED ORAL at 04:45

## 2021-02-05 RX ADMIN — DIGOXIN 125 MCG: 125 TABLET ORAL at 04:45

## 2021-02-05 RX ADMIN — LORATADINE 10 MG: 10 TABLET ORAL at 04:46

## 2021-02-05 RX ADMIN — Medication 500 MG: at 09:02

## 2021-02-05 RX ADMIN — TAMSULOSIN HYDROCHLORIDE 0.4 MG: 0.4 CAPSULE ORAL at 09:02

## 2021-02-05 RX ADMIN — LEVOTHYROXINE SODIUM 112 MCG: 0.11 TABLET ORAL at 04:45

## 2021-02-05 NOTE — FACE TO FACE
Face to Face Supporting Documentation - Home Health    The encounter with this patient was in whole or in part the primary reason for home health admission.    Date of encounter:   Patient:                    MRN:                       YOB: 2023  Sebastián Castro  [id number]  1965     Home health to see patient for:  Skilled Nursing care for assessment, interventions & education, Physical Therapy evaluation and treatment and Occupational therapy evaluation and treatment    Skilled need for:  Exacerbation of Chronic Disease State debility, Recent Deterioration of Health Status psychosis and Medication Management for psychiatric meds, CHF, A-fib    Skilled nursing interventions to include:  Comment: Medication management, colostomy bag replacements    Homebound status evidenced by:  Need the aid of supportive devices such as crutches, canes, wheelchairs or walkers, Require the use of special transportation or Needs the assistance of another person in order to leave the home. Leaving home requires a considerable and taxing effort. There is a normal inability to leave the home.    Community Physician to provide follow up care: ELIZABETH Terrazas     Optional Interventions? No      I certify the face to face encounter for this home health care referral meets the CMS requirements and the encounter/clinical assessment with the patient was, in whole, or in part, for the medical condition(s) listed above, which is the primary reason for home health care. Based on my clinical findings: the service(s) are medically necessary, support the need for home health care, and the homebound criteria are met.  I certify that this patient has had a face to face encounter by myself.  Fabián Grace M.D. - NPI: [id number]

## 2021-02-05 NOTE — CARE PLAN
Problem: Communication  Goal: The ability to communicate needs accurately and effectively will improve  Outcome: PROGRESSING AS EXPECTED     Problem: Safety  Goal: Will remain free from injury  Outcome: PROGRESSING AS EXPECTED  Goal: Will remain free from falls  Outcome: PROGRESSING AS EXPECTED     Problem: Infection  Goal: Will remain free from infection  Outcome: PROGRESSING AS EXPECTED     Problem: Venous Thromboembolism (VTW)/Deep Vein Thrombosis (DVT) Prevention:  Goal: Patient will participate in Venous Thrombosis (VTE)/Deep Vein Thrombosis (DVT)Prevention Measures  Outcome: PROGRESSING AS EXPECTED     Problem: Bowel/Gastric:  Goal: Normal bowel function is maintained or improved  Outcome: PROGRESSING AS EXPECTED  Goal: Will not experience complications related to bowel motility  Outcome: PROGRESSING AS EXPECTED     Problem: Knowledge Deficit  Goal: Knowledge of disease process/condition, treatment plan, diagnostic tests, and medications will improve  Outcome: PROGRESSING AS EXPECTED  Goal: Knowledge of the prescribed therapeutic regimen will improve  Outcome: PROGRESSING AS EXPECTED     Problem: Discharge Barriers/Planning  Goal: Patient's continuum of care needs will be met  Outcome: PROGRESSING AS EXPECTED     Problem: Potential for Self Harm  Goal: Stable mood and functioning  Outcome: PROGRESSING AS EXPECTED  Goal: Improved mood and functioning  Outcome: PROGRESSING AS EXPECTED  Goal: Achieve stable functioning  Outcome: PROGRESSING AS EXPECTED  Goal: No active suicidal ideation  Outcome: PROGRESSING AS EXPECTED  Goal: No active self-harm  Outcome: PROGRESSING AS EXPECTED     Problem: Pain Management  Goal: Pain level will decrease to patient's comfort goal  Outcome: PROGRESSING AS EXPECTED     Problem: Skin Integrity  Goal: Risk for impaired skin integrity will decrease  Outcome: PROGRESSING AS EXPECTED     Problem: Mobility  Goal: Risk for activity intolerance will decrease  Outcome: PROGRESSING AS  EXPECTED

## 2021-02-05 NOTE — PROGRESS NOTES
Pt AxO x4 at time of assessment, denied any pain and denied any suicidal ideation.  Pt on RA and is not displaying any s/s of distress.  Pt up w/ SB assistance and tolerates ambulation well.  Pt legal hold discontinued and 1:1 safety sitter discontinued as well.  Pt in bed, resting for the night and denied any further needs.  Call light within reach, bed locked and in lowest position, bed alarm on, frequent rounding in place.

## 2021-02-05 NOTE — DISCHARGE PLANNING
Received Choice form at 9900  Agency/Facility Name: [facility]  Referral sent per Choice form @ 5400

## 2021-02-05 NOTE — DISCHARGE SUMMARY
"Discharge Summary    CHIEF COMPLAINT ON ADMISSION  Chief Complaint   Patient presents with   • Suicidal Ideation     reports having thoughts of killing himself over the past 2 days.        Reason for Admission  EMS     Admission Date  2021    CODE STATUS  Full Code    HPI & HOSPITAL COURSE  55M PMH Atrial fibrillation not on chronic anticoagulation, T2DM A1c 7.8 (), obesity, CHF LVEF 30-40% (by echo 20), dyslipidemia presenting 2021 for suicidal ideations.  Patient was at [facility] for skilled nursing, where he started becoming worsening on depression and having auditory hallucinations.  Patient mention his auditory hallucinations have happened 6 months ago before.  He states he hears multiple voices.  He does have suicidal ideation by taking any of his medications or \"cutting his wrists.  He does feel depressed after his son  couple months ago from COVID-19.  Patient feels depressed also given his medical condition, weakness in his legs he feels is not improving and feels that he is better off if he was dead.    Patient was admitted for suicidal ideation as well as psychotic features with auditory hallucinations.  Patient mention his auditory hallucinations were multiple voices and violent in nature.  Patient was initially placed on a hold which was extended by psychiatry after their initial evaluation.  Psychiatry recommended for patient to stop sertraline and patient was placed on a higher dose of Risperdal and modafinil.  The next day immediately after patient was changed on his medications he started having more improvements.  He was having lessening on the auditory hallucinations as well as the severity of his suicidal ideations.  Day 3 of his admission, the patient had very minimal auditory hallucinations mentioning his multiple voices became only mumble as well as had significantly improved suicidal ideations.      On day of discharge the patient had no further psychotic " features, no hallucinations and denied any suicidal or homicidal ideations.  Psychiatrist had released the patient from his psychiatric legal hold and no longer required any inpatient psychiatric facility for discharge.  Patient was doing well and were able to obtain a patient for group home health care.    As for the patient's complaint of leg weakness, this had significantly improved once modafinil was started.  Patient was able to ambulate with the use of a walker did not require full assistance.  Patient is capable of changing his own colostomy bag which he has done for years now.    Patient's medications on admission noted that multiple medications patient had not been continued for had been taking.  Unclear why the patient has not been taking his medications from before, especially medication from A-fib and CHF.  At this time, patient will need to visit his primary care physician and have referral to or continue to see cardiology to start back on his heart medications.  He will need a referral to outpatient psychiatry, which patient did mention he had been following psychiatrist before, if so he can return to his outpatient psychiatrist for further review and management of psychiatric medications.    New medications listed below.  Medications were brought to his bedside by [facility] pharmacy.  Home health was ordered for PT/OT and skilled nurse to help with colostomy bag changes and medication management for his new meds.    Therefore, he is discharged in good and stable condition to group home [facility] with close outpatient follow-up and home health.  Front wheel walker given to patient.    The patient met 2-midnight criteria for an inpatient stay at the time of discharge.    Discharge Date  02/05/2021    FOLLOW UP ITEMS POST DISCHARGE  Listed below with PCP    DISCHARGE DIAGNOSES  Principal Problem (Resolved):    Suicidal ideation POA: Yes  Active Problems:    Type 2 diabetes mellitus with hyperglycemia,  without long-term current use of insulin (HCC) POA: Yes    Essential hypertension POA: Yes    Chronic atrial fibrillation POA: Yes    Chronic systolic heart failure (HCC) POA: Yes    Adrenal insufficiency (HCC) POA: Yes      Overview: Cortisol 17 in stress state      Hydrocortisone started    Hypothyroidism POA: Yes    Anxiety and depression POA: Yes    Obesity POA: Yes    Colostomy present (HCC) POA: Yes    Elevated liver enzymes POA: Yes  Resolved Problems:    Hypomagnesemia POA: Yes    Left leg weakness POA: Yes      FOLLOW UP  No future appointments.  ELIZABETH Terrazas  [address] [address]  [phone]    Schedule an appointment as soon as possible for a visit  F/U for hospitalization for suicidal ideation. Patient will need to continue risperdal and modafinil. Please have patient return to his outpatient psychiatrist.      MEDICATIONS ON DISCHARGE     Medication List      START taking these medications      Instructions   atorvastatin 40 MG Tabs  Commonly known as: LIPITOR   Take 1 Tab by mouth every evening for 30 days.  Dose: 40 mg     diltiazem  MG ER capsule  Start taking on: February 6, 2021  Commonly known as: CARDIZEM CD  Replaces: Diltiazem HCl Coated Beads 360 MG Tb24   Take 1 Cap by mouth every morning for 30 days.  Dose: 360 mg     Lantus SoloStar 100 UNIT/ML Sopn injection  Generic drug: insulin glargine  Replaces: insulin glargine 100 UNIT/ML Soln   Inject 15 Units under the skin every evening for 30 days.  Dose: 15 Units     modafinil 100 MG Tabs  Start taking on: February 6, 2021  Commonly known as: PROVIGIL   Take 1 Tab by mouth every morning for 30 days.  Dose: 100 mg        CHANGE how you take these medications      Instructions   furosemide 20 MG Tabs  Start taking on: February 6, 2021  What changed: when to take this  Commonly known as: LASIX   Take 1 Tab by mouth every day for 30 days.  Dose: 20 mg     risperiDONE 2 MG Tabs  What changed:   · medication  strength  · how much to take  · when to take this  · additional instructions  · Another medication with the same name was removed. Continue taking this medication, and follow the directions you see here.  Commonly known as: RISPERDAL   Take 1 Tab by mouth 2 times a day for 30 days.  Dose: 2 mg        CONTINUE taking these medications      Instructions   digoxin 125 MCG Tabs  Start taking on: February 6, 2021  Commonly known as: LANOXIN   Take 1 Tab by mouth every day for 30 days.  Dose: 125 mcg     fludrocortisone 0.1 MG Tabs  Commonly known as: FLORINEF   Take 1 Tab by mouth every morning for 30 days.  Dose: 0.1 mg     levothyroxine 112 MCG Tabs  Commonly known as: SYNTHROID   Take 1 Tab by mouth Every morning on an empty stomach for 30 days.  Dose: 112 mcg     loratadine 10 MG Tabs  Commonly known as: CLARITIN   Take 1 Tab by mouth every morning for 30 days.  Dose: 10 mg     tamsulosin 0.4 MG capsule  Start taking on: February 6, 2021  Commonly known as: FLOMAX   Take 1 Cap by mouth 1/2 hour after breakfast for 30 days.  Dose: 0.4 mg        STOP taking these medications    acetaminophen 325 MG Tabs  Commonly known as: Tylenol     albuterol 2.5mg/0.5ml Nebu  Commonly known as: PROVENTIL     amiodarone 200 MG Tabs  Commonly known as: Cordarone     cetirizine 10 MG Tabs  Commonly known as: ZYRTEC     Diltiazem HCl Coated Beads 360 MG Tb24  Replaced by: diltiazem  MG ER capsule     famotidine 20 MG Tabs  Commonly known as: PEPCID     hydrOXYzine HCl 25 MG Tabs  Commonly known as: ATARAX     insulin glargine 100 UNIT/ML Soln  Commonly known as: Lantus  Replaced by: Lantus SoloStar 100 UNIT/ML Sopn injection     insulin regular 100 Unit/mL Soln  Commonly known as: HumuLIN R     lisinopril 2.5 MG Tabs  Commonly known as: PRINIVIL     metoprolol SR 50 MG Tb24  Commonly known as: TOPROL XL     midodrine 2.5 MG Tabs  Commonly known as: PROAMATINE     ondansetron 4 MG Tbdp  Commonly known as: ZOFRAN ODT     sennosides  8.6 MG Tabs  Commonly known as: SENOKOT     spironolactone 25 MG Tabs  Commonly known as: ALDACTONE     traZODone 50 MG Tabs  Commonly known as: DESYREL            Allergies  Allergies   Allergen Reactions   • Daptomycin Rash     Body rash  RXN=1/2018     • Pcn [Penicillins] Hives and Rash      Rash & hives  RXN=since a kid  Tolerates cephalosporins   • Unasyn [Ampicillin-Sulbactam Sodium] Hives, Itching and Swelling   • Vancomycin Rash     Red man syndrome. Tolerates IV vancomycin at reduced infusion rate.       DIET  Orders Placed This Encounter   Procedures   • Diet Order Diet: Consistent CHO (Diabetic); Tray Modifications (optional): No Sharps (Paperware)     Standing Status:   Standing     Number of Occurrences:   1     Order Specific Question:   Diet:     Answer:   Consistent CHO (Diabetic) [4]     Order Specific Question:   Tray Modifications (optional)     Answer:   No Sharps (Paperware)       ACTIVITY  As tolerated.  Weight bearing as tolerated    CONSULTATIONS  Psychiatry    PROCEDURES  none    LABORATORY  Lab Results   Component Value Date    SODIUM 137 02/04/2021    POTASSIUM 4.1 02/04/2021    CHLORIDE 101 02/04/2021    CO2 25 02/04/2021    GLUCOSE 99 02/04/2021    BUN 15 02/04/2021    CREATININE 1.10 02/04/2021        Lab Results   Component Value Date    WBC 7.8 02/03/2021    HEMOGLOBIN 12.4 (L) 02/03/2021    HEMATOCRIT 37.5 (L) 02/03/2021    PLATELETCT 193 02/03/2021        Total time of the discharge process exceeds 35 minutes.  More than 50% of time was spent face to face with patient.  This included but not limited to review of hospital course with patient, treatment goals upon discharge, recommendations to PCP, continued and new medications and their adverse reactions and nursing instructions for patient.

## 2021-02-05 NOTE — DISCHARGE PLANNING
Received Choice form at [phone]  Agency/Facility Name: [facility]  Referral sent per Choice form @ 2721

## 2021-02-05 NOTE — CARE PLAN
Problem: Safety  Goal: Will remain free from falls  Outcome: PROGRESSING AS EXPECTED   Pt has not fallen on shift, fall precautions in place    Problem: Venous Thromboembolism (VTW)/Deep Vein Thrombosis (DVT) Prevention:  Goal: Patient will participate in Venous Thrombosis (VTE)/Deep Vein Thrombosis (DVT)Prevention Measures  Outcome: PROGRESSING AS EXPECTED   Pt receiving scheduled enoxaparin injections and is ambulatory.

## 2021-02-05 NOTE — DISCHARGE INSTRUCTIONS
Discharge Instructions    Discharged to group home by car with escort. Discharged via wheelchair, hospital escort: Refused.  Special equipment needed: Walker    Be sure to schedule a follow-up appointment with your primary care doctor or any specialists as instructed.     Discharge Plan:   Influenza Vaccine Indication: Not indicated: Previously immunized this influenza season and > 8 years of age    I understand that a diet low in cholesterol, fat, and sodium is recommended for good health. Unless I have been given specific instructions below for another diet, I accept this instruction as my diet prescription.   Other diet: Diabetic    Special Instructions: None    · Is patient discharged on Warfarin / Coumadin?   No     Depression / Suicide Risk    As you are discharged from this [facility] facility, it is important to learn how to keep safe from harming yourself.    Recognize the warning signs:  · Abrupt changes in personality, positive or negative- including increase in energy   · Giving away possessions  · Change in eating patterns- significant weight changes-  positive or negative  · Change in sleeping patterns- unable to sleep or sleeping all the time   · Unwillingness or inability to communicate  · Depression  · Unusual sadness, discouragement and loneliness  · Talk of wanting to die  · Neglect of personal appearance   · Rebelliousness- reckless behavior  · Withdrawal from people/activities they love  · Confusion- inability to concentrate     If you or a loved one observes any of these behaviors or has concerns about self-harm, here's what you can do:  · Talk about it- your feelings and reasons for harming yourself  · Remove any means that you might use to hurt yourself (examples: pills, rope, extension cords, firearm)  · Get professional help from the community (Mental Health, Substance Abuse, psychological counseling)  · Do not be alone:Call your Safe Contact- someone whom you trust who will be there for  you.  · Call your local CRISIS HOTLINE 099-7367 or 994-039-6996  · Call your local Children's Mobile Crisis Response Team [place] (114) 831-7592 or www.GateMe  · Call the toll free National Suicide Prevention Hotlines   · National Suicide Prevention Lifeline 262-941-XFPE (3970)  · National TouchFrame Line Network 800-SUICIDE (871-0945)

## 2021-02-05 NOTE — DISCHARGE PLANNING
Anticipated Discharge Disposition: [facility].     Action: Patient was discussed with Provider, patient is medically cleared. This RN,  called the [facility] home, [facility] [employer]. I left a message for [facility] ownerMatilde: [phone].     This RN,  called [facility], I spoke with Brittney:[phone]. Brittney completed an assessment with patient on the telephone. Allison called this RN,  after assessment informing me that patient has been accepted to the [facility] program. This RN,  had a conversation with patient at bedside after assessment, patient stated he wanted to discharge to [facility], he has been there before and was happy to go back.     This RN,  notified Provider, Dr. Fabián Grace for discharge orders, medications sent to [facility] pharmacy, patient will need to have Meds to Beds delivered prior to discharging to [facility] per Brittney. Bedside RNJasvir was notified of discharge. This RN,  called the [facility] for pickup time, I will receive a call back with time.     Patient is requesting a fww and home health at [facility], this RN,  asked Provider, Dr. Fabián Grace for a referral for DME and home health.     Barriers to Discharge: None    Plan:  available for any/all discharge needs. Discharge has been set up to [facility]. Transportation will be provided by [facility]. Will send referrals for DME and home health once in [facility].     1515: Referrals have been placed by Provider for home health and DME, fww. This RN, called bedside RN, Jasvir Jordan, informing him that referrals have been sent out for home health and fww, he can retreive a fww from [facility], also patient can discharge to [facility] and an RN will follow him at [facility]. Choice document was faxed to Navdeep LU to [facility] for DME, and [facility] for home  SEAT 4a. [facility] will send a van to pick patient up at 1630, colostomy supplies were given to patient prior to discharge by Jasvir Jordan.     1600: Patient is being discharged with [facility] includes the [facility] for RN services. [place] did not accept. New choice document was faxed to Navdeep LU. Patient was notified.     Luda Garduno RN,

## 2021-02-06 NOTE — DISCHARGE PLANNING
Meds-to-Beds: Discharge prescription orders listed below delivered to patient's bedside. RN notified. Patient counseled.    The followings is too soon through insurance, patient notified:  Digoxin too soon till 2/21, fludrocortisone 2/16, glargine 2/15, levothyroxine 2/16, tamsulosin 2/18. Modafinil need PA.     Sebastián Castro   Home Medication Instructions HIRAM:[id number]    Printed on:02/05/21 1650   Medication Information                      atorvastatin (LIPITOR) 40 MG Tab  Take 1 Tablet by mouth every evening for 30 days.             DILTIAZem CD (CARDIZEM CD) 360 MG CAPSULE SR 24 HR  Take 1 Cap by mouth every morning for 30 days.             furosemide (LASIX) 20 MG Tab  Take 1 Tablet by mouth every day for 30 days.             loratadine (CLARITIN) 10 MG Tab  Take 1 Tabletby mouth every morning for 30 days.             risperiDONE (RISPERDAL) 2 MG Tab  Take 1 Tablet by mouth 2 times a day for 30 days.                 Amanda Choe, Pharmacy Intern

## 2021-02-09 ENCOUNTER — APPOINTMENT (OUTPATIENT)
Dept: RADIOLOGY | Facility: MEDICAL CENTER | Age: 56
DRG: 981 | End: 2021-02-09
Attending: EMERGENCY MEDICINE
Payer: MEDICAID

## 2021-02-09 ENCOUNTER — HOSPITAL ENCOUNTER (INPATIENT)
Facility: MEDICAL CENTER | Age: 56
LOS: 149 days | DRG: 981 | End: 2021-07-09
Attending: EMERGENCY MEDICINE | Admitting: HOSPITALIST
Payer: MEDICAID

## 2021-02-09 DIAGNOSIS — R79.89 ELEVATED TROPONIN: ICD-10-CM

## 2021-02-09 DIAGNOSIS — R55 SYNCOPE, UNSPECIFIED SYNCOPE TYPE: ICD-10-CM

## 2021-02-09 DIAGNOSIS — D72.10 EOSINOPHILIA, UNSPECIFIED TYPE: ICD-10-CM

## 2021-02-09 DIAGNOSIS — R53.81 DEBILITY: ICD-10-CM

## 2021-02-09 DIAGNOSIS — R42 DIZZINESS: ICD-10-CM

## 2021-02-09 DIAGNOSIS — I87.2 VENOUS STASIS DERMATITIS OF BOTH LOWER EXTREMITIES: ICD-10-CM

## 2021-02-09 DIAGNOSIS — Z93.3 COLOSTOMY PRESENT (HCC): ICD-10-CM

## 2021-02-09 DIAGNOSIS — I48.11 LONGSTANDING PERSISTENT ATRIAL FIBRILLATION (HCC): ICD-10-CM

## 2021-02-09 DIAGNOSIS — N13.9 OBSTRUCTIVE UROPATHY: ICD-10-CM

## 2021-02-09 DIAGNOSIS — F33.3 MAJOR DEPRESSIVE DISORDER, RECURRENT, SEVERE WITH PSYCHOTIC FEATURES (HCC): ICD-10-CM

## 2021-02-09 DIAGNOSIS — I95.1 ORTHOSTATIC HYPOTENSION: ICD-10-CM
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
## 2021-02-09 DIAGNOSIS — F44.5 PSYCHOGENIC NONEPILEPTIC SEIZURE: ICD-10-CM

## 2021-02-09 DIAGNOSIS — I42.8 NICM (NONISCHEMIC CARDIOMYOPATHY) (HCC): ICD-10-CM

## 2021-02-09 PROBLEM — I48.91 AF (ATRIAL FIBRILLATION) (HCC): Status: ACTIVE | Noted: 2021-02-09

## 2021-02-09 LAB
ALBUMIN SERPL BCP-MCNC: 3.9 G/DL (ref 3.2–4.9)
ALBUMIN/GLOB SERPL: 1 G/DL
ALP SERPL-CCNC: 108 U/L (ref 30–99)
ALT SERPL-CCNC: 40 U/L (ref 2–50)
ANION GAP SERPL CALC-SCNC: 12 MMOL/L (ref 7–16)
APTT PPP: 29.6 SEC (ref 24.7–36)
AST SERPL-CCNC: 33 U/L (ref 12–45)
BASOPHILS # BLD AUTO: 0.4 % (ref 0–1.8)
BASOPHILS # BLD: 0.05 K/UL (ref 0–0.12)
BILIRUB SERPL-MCNC: 0.5 MG/DL (ref 0.1–1.5)
BUN SERPL-MCNC: 15 MG/DL (ref 8–22)
CALCIUM SERPL-MCNC: 9.7 MG/DL (ref 8.5–10.5)
CHLORIDE SERPL-SCNC: 100 MMOL/L (ref 96–112)
CO2 SERPL-SCNC: 27 MMOL/L (ref 20–33)
CREAT SERPL-MCNC: 1.17 MG/DL (ref 0.5–1.4)
DIGOXIN SERPL-MCNC: 0.7 NG/ML (ref 0.8–2)
EKG IMPRESSION: NORMAL
EKG IMPRESSION: NORMAL
EOSINOPHIL # BLD AUTO: 0.32 K/UL (ref 0–0.51)
EOSINOPHIL NFR BLD: 2.8 % (ref 0–6.9)
ERYTHROCYTE [DISTWIDTH] IN BLOOD BY AUTOMATED COUNT: 45.9 FL (ref 35.9–50)
GLOBULIN SER CALC-MCNC: 3.8 G/DL (ref 1.9–3.5)
GLUCOSE BLD-MCNC: 129 MG/DL (ref 65–99)
GLUCOSE BLD-MCNC: 137 MG/DL (ref 65–99)
GLUCOSE SERPL-MCNC: 151 MG/DL (ref 65–99)
HCT VFR BLD AUTO: 47.8 % (ref 42–52)
HGB BLD-MCNC: 15.6 G/DL (ref 14–18)
IMM GRANULOCYTES # BLD AUTO: 0.05 K/UL (ref 0–0.11)
IMM GRANULOCYTES NFR BLD AUTO: 0.4 % (ref 0–0.9)
INR PPP: 1.08 (ref 0.87–1.13)
LYMPHOCYTES # BLD AUTO: 1.74 K/UL (ref 1–4.8)
LYMPHOCYTES NFR BLD: 15.2 % (ref 22–41)
MCH RBC QN AUTO: 29.1 PG (ref 27–33)
MCHC RBC AUTO-ENTMCNC: 32.6 G/DL (ref 33.7–35.3)
MCV RBC AUTO: 89.2 FL (ref 81.4–97.8)
MONOCYTES # BLD AUTO: 0.78 K/UL (ref 0–0.85)
MONOCYTES NFR BLD AUTO: 6.8 % (ref 0–13.4)
NEUTROPHILS # BLD AUTO: 8.48 K/UL (ref 1.82–7.42)
NEUTROPHILS NFR BLD: 74.4 % (ref 44–72)
NRBC # BLD AUTO: 0 K/UL
NRBC BLD-RTO: 0 /100 WBC
NT-PROBNP SERPL IA-MCNC: 4153 PG/ML (ref 0–125)
PLATELET # BLD AUTO: 229 K/UL (ref 164–446)
PMV BLD AUTO: 10.7 FL (ref 9–12.9)
POTASSIUM SERPL-SCNC: 4.3 MMOL/L (ref 3.6–5.5)
PROT SERPL-MCNC: 7.7 G/DL (ref 6–8.2)
PROTHROMBIN TIME: 14.3 SEC (ref 12–14.6)
RBC # BLD AUTO: 5.36 M/UL (ref 4.7–6.1)
SARS-COV-2 RNA RESP QL NAA+PROBE: NOTDETECTED
SODIUM SERPL-SCNC: 139 MMOL/L (ref 135–145)
SPECIMEN SOURCE: NORMAL
TROPONIN T SERPL-MCNC: 53 NG/L (ref 6–19)
TROPONIN T SERPL-MCNC: 56 NG/L (ref 6–19)
TROPONIN T SERPL-MCNC: 67 NG/L (ref 6–19)
WBC # BLD AUTO: 11.4 K/UL (ref 4.8–10.8)

## 2021-02-09 PROCEDURE — 700105 HCHG RX REV CODE 258: Performed by: STUDENT IN AN ORGANIZED HEALTH CARE EDUCATION/TRAINING PROGRAM

## 2021-02-09 PROCEDURE — 93005 ELECTROCARDIOGRAM TRACING: CPT

## 2021-02-09 PROCEDURE — 82533 TOTAL CORTISOL: CPT

## 2021-02-09 PROCEDURE — 93005 ELECTROCARDIOGRAM TRACING: CPT | Performed by: EMERGENCY MEDICINE

## 2021-02-09 PROCEDURE — 84484 ASSAY OF TROPONIN QUANT: CPT

## 2021-02-09 PROCEDURE — 700111 HCHG RX REV CODE 636 W/ 250 OVERRIDE (IP): Performed by: HOSPITALIST

## 2021-02-09 PROCEDURE — 85025 COMPLETE CBC W/AUTO DIFF WBC: CPT

## 2021-02-09 PROCEDURE — 99245 OFF/OP CONSLTJ NEW/EST HI 55: CPT | Performed by: INTERNAL MEDICINE

## 2021-02-09 PROCEDURE — 51798 US URINE CAPACITY MEASURE: CPT

## 2021-02-09 PROCEDURE — 93005 ELECTROCARDIOGRAM TRACING: CPT | Performed by: STUDENT IN AN ORGANIZED HEALTH CARE EDUCATION/TRAINING PROGRAM

## 2021-02-09 PROCEDURE — 700102 HCHG RX REV CODE 250 W/ 637 OVERRIDE(OP): Performed by: HOSPITALIST

## 2021-02-09 PROCEDURE — A9270 NON-COVERED ITEM OR SERVICE: HCPCS | Performed by: STUDENT IN AN ORGANIZED HEALTH CARE EDUCATION/TRAINING PROGRAM

## 2021-02-09 PROCEDURE — G0378 HOSPITAL OBSERVATION PER HR: HCPCS

## 2021-02-09 PROCEDURE — 99220 PR INITIAL OBSERVATION CARE,LEVL III: CPT | Performed by: HOSPITALIST

## 2021-02-09 PROCEDURE — 85730 THROMBOPLASTIN TIME PARTIAL: CPT

## 2021-02-09 PROCEDURE — A9270 NON-COVERED ITEM OR SERVICE: HCPCS | Performed by: HOSPITALIST

## 2021-02-09 PROCEDURE — 80162 ASSAY OF DIGOXIN TOTAL: CPT

## 2021-02-09 PROCEDURE — 85610 PROTHROMBIN TIME: CPT

## 2021-02-09 PROCEDURE — 72100 X-RAY EXAM L-S SPINE 2/3 VWS: CPT

## 2021-02-09 PROCEDURE — 36415 COLL VENOUS BLD VENIPUNCTURE: CPT

## 2021-02-09 PROCEDURE — 99285 EMERGENCY DEPT VISIT HI MDM: CPT

## 2021-02-09 PROCEDURE — 83880 ASSAY OF NATRIURETIC PEPTIDE: CPT

## 2021-02-09 PROCEDURE — U0005 INFEC AGEN DETEC AMPLI PROBE: HCPCS

## 2021-02-09 PROCEDURE — 82962 GLUCOSE BLOOD TEST: CPT | Mod: 91

## 2021-02-09 PROCEDURE — 700102 HCHG RX REV CODE 250 W/ 637 OVERRIDE(OP): Performed by: STUDENT IN AN ORGANIZED HEALTH CARE EDUCATION/TRAINING PROGRAM

## 2021-02-09 PROCEDURE — 80053 COMPREHEN METABOLIC PANEL: CPT

## 2021-02-09 PROCEDURE — U0003 INFECTIOUS AGENT DETECTION BY NUCLEIC ACID (DNA OR RNA); SEVERE ACUTE RESPIRATORY SYNDROME CORONAVIRUS 2 (SARS-COV-2) (CORONAVIRUS DISEASE [COVID-19]), AMPLIFIED PROBE TECHNIQUE, MAKING USE OF HIGH THROUGHPUT TECHNOLOGIES AS DESCRIBED BY CMS-2020-01-R: HCPCS

## 2021-02-09 PROCEDURE — 93010 ELECTROCARDIOGRAM REPORT: CPT | Performed by: INTERNAL MEDICINE

## 2021-02-09 PROCEDURE — 700111 HCHG RX REV CODE 636 W/ 250 OVERRIDE (IP): Performed by: INTERNAL MEDICINE

## 2021-02-09 RX ORDER — LEVOTHYROXINE SODIUM 112 UG/1
112 TABLET ORAL
Status: DISCONTINUED | OUTPATIENT
Start: 2021-02-09 | End: 2021-05-29

## 2021-02-09 RX ORDER — PROMETHAZINE HYDROCHLORIDE 25 MG/1
12.5-25 SUPPOSITORY RECTAL EVERY 4 HOURS PRN
Status: DISCONTINUED | OUTPATIENT
Start: 2021-02-09 | End: 2021-04-13

## 2021-02-09 RX ORDER — LISINOPRIL 10 MG/1
10 TABLET ORAL DAILY
Status: DISCONTINUED | OUTPATIENT
Start: 2021-02-09 | End: 2021-02-09

## 2021-02-09 RX ORDER — PROCHLORPERAZINE EDISYLATE 5 MG/ML
5-10 INJECTION INTRAMUSCULAR; INTRAVENOUS EVERY 4 HOURS PRN
Status: DISCONTINUED | OUTPATIENT
Start: 2021-02-09 | End: 2021-04-13

## 2021-02-09 RX ORDER — METOPROLOL SUCCINATE 25 MG/1
25 TABLET, EXTENDED RELEASE ORAL DAILY
Status: DISCONTINUED | OUTPATIENT
Start: 2021-02-09 | End: 2021-02-09

## 2021-02-09 RX ORDER — DILTIAZEM HYDROCHLORIDE 360 MG/1
360 CAPSULE, EXTENDED RELEASE ORAL EVERY MORNING
Status: DISCONTINUED | OUTPATIENT
Start: 2021-02-09 | End: 2021-02-09

## 2021-02-09 RX ORDER — FUROSEMIDE 20 MG/1
20 TABLET ORAL DAILY
Status: DISCONTINUED | OUTPATIENT
Start: 2021-02-09 | End: 2021-02-09

## 2021-02-09 RX ORDER — SODIUM CHLORIDE 9 MG/ML
INJECTION, SOLUTION INTRAVENOUS CONTINUOUS
Status: DISCONTINUED | OUTPATIENT
Start: 2021-02-09 | End: 2021-02-09

## 2021-02-09 RX ORDER — MODAFINIL 100 MG/1
100 TABLET ORAL EVERY MORNING
Status: DISCONTINUED | OUTPATIENT
Start: 2021-02-09 | End: 2021-02-19

## 2021-02-09 RX ORDER — MIDODRINE HYDROCHLORIDE 5 MG/1
10 TABLET ORAL ONCE
Status: COMPLETED | OUTPATIENT
Start: 2021-02-09 | End: 2021-02-09

## 2021-02-09 RX ORDER — FLUDROCORTISONE ACETATE 0.1 MG/1
0.1 TABLET ORAL EVERY MORNING
Status: DISCONTINUED | OUTPATIENT
Start: 2021-02-09 | End: 2021-02-09

## 2021-02-09 RX ORDER — ONDANSETRON 2 MG/ML
4 INJECTION INTRAMUSCULAR; INTRAVENOUS EVERY 4 HOURS PRN
Status: DISCONTINUED | OUTPATIENT
Start: 2021-02-09 | End: 2021-04-13

## 2021-02-09 RX ORDER — SODIUM CHLORIDE 9 MG/ML
500 INJECTION, SOLUTION INTRAVENOUS ONCE
Status: COMPLETED | OUTPATIENT
Start: 2021-02-09 | End: 2021-02-09

## 2021-02-09 RX ORDER — DEXTROSE MONOHYDRATE 25 G/50ML
50 INJECTION, SOLUTION INTRAVENOUS
Status: DISCONTINUED | OUTPATIENT
Start: 2021-02-09 | End: 2021-02-22

## 2021-02-09 RX ORDER — TAMSULOSIN HYDROCHLORIDE 0.4 MG/1
0.4 CAPSULE ORAL
Status: DISCONTINUED | OUTPATIENT
Start: 2021-02-09 | End: 2021-02-11

## 2021-02-09 RX ORDER — ONDANSETRON 4 MG/1
4 TABLET, ORALLY DISINTEGRATING ORAL EVERY 4 HOURS PRN
Status: DISCONTINUED | OUTPATIENT
Start: 2021-02-09 | End: 2021-07-09 | Stop reason: HOSPADM

## 2021-02-09 RX ORDER — ATORVASTATIN CALCIUM 40 MG/1
40 TABLET, FILM COATED ORAL EVERY EVENING
Status: DISCONTINUED | OUTPATIENT
Start: 2021-02-09 | End: 2021-07-09 | Stop reason: HOSPADM

## 2021-02-09 RX ORDER — PROMETHAZINE HYDROCHLORIDE 25 MG/1
12.5-25 TABLET ORAL EVERY 4 HOURS PRN
Status: DISCONTINUED | OUTPATIENT
Start: 2021-02-09 | End: 2021-04-13

## 2021-02-09 RX ORDER — METOPROLOL SUCCINATE 25 MG/1
25 TABLET, EXTENDED RELEASE ORAL DAILY
COMMUNITY
End: 2022-08-29 | Stop reason: SDUPTHER

## 2021-02-09 RX ORDER — DIGOXIN 125 MCG
125 TABLET ORAL EVERY EVENING
Status: DISCONTINUED | OUTPATIENT
Start: 2021-02-09 | End: 2021-02-09

## 2021-02-09 RX ORDER — LISINOPRIL 10 MG/1
10 TABLET ORAL DAILY
Status: ON HOLD | COMMUNITY
End: 2021-05-19

## 2021-02-09 RX ORDER — MIDODRINE HYDROCHLORIDE 5 MG/1
5 TABLET ORAL ONCE
Status: COMPLETED | OUTPATIENT
Start: 2021-02-09 | End: 2021-02-09

## 2021-02-09 RX ORDER — MIDODRINE HYDROCHLORIDE 5 MG/1
5 TABLET ORAL
Status: DISCONTINUED | OUTPATIENT
Start: 2021-02-10 | End: 2021-02-09

## 2021-02-09 RX ORDER — AMOXICILLIN 250 MG
2 CAPSULE ORAL 2 TIMES DAILY
Status: DISCONTINUED | OUTPATIENT
Start: 2021-02-09 | End: 2021-04-13

## 2021-02-09 RX ORDER — POLYETHYLENE GLYCOL 3350 17 G/17G
1 POWDER, FOR SOLUTION ORAL
Status: DISCONTINUED | OUTPATIENT
Start: 2021-02-09 | End: 2021-04-13

## 2021-02-09 RX ORDER — RISPERIDONE 1 MG/1
2 TABLET ORAL 2 TIMES DAILY
Status: DISCONTINUED | OUTPATIENT
Start: 2021-02-09 | End: 2021-02-10

## 2021-02-09 RX ORDER — HEPARIN SODIUM 5000 [USP'U]/ML
5000 INJECTION, SOLUTION INTRAVENOUS; SUBCUTANEOUS EVERY 8 HOURS
Status: DISCONTINUED | OUTPATIENT
Start: 2021-02-09 | End: 2021-02-10

## 2021-02-09 RX ORDER — BISACODYL 10 MG
10 SUPPOSITORY, RECTAL RECTAL
Status: DISCONTINUED | OUTPATIENT
Start: 2021-02-09 | End: 2021-04-13

## 2021-02-09 RX ORDER — INSULIN GLARGINE 100 [IU]/ML
15 INJECTION, SOLUTION SUBCUTANEOUS EVERY EVENING
Status: DISCONTINUED | OUTPATIENT
Start: 2021-02-09 | End: 2021-02-22

## 2021-02-09 RX ADMIN — RISPERIDONE 2 MG: 1 TABLET ORAL at 13:38

## 2021-02-09 RX ADMIN — LEVOTHYROXINE SODIUM 112 MCG: 0.11 TABLET ORAL at 13:38

## 2021-02-09 RX ADMIN — MIDODRINE HYDROCHLORIDE 10 MG: 5 TABLET ORAL at 20:32

## 2021-02-09 RX ADMIN — SODIUM CHLORIDE 500 ML: 9 INJECTION, SOLUTION INTRAVENOUS at 20:32

## 2021-02-09 RX ADMIN — RISPERIDONE 2 MG: 1 TABLET ORAL at 17:05

## 2021-02-09 RX ADMIN — ATORVASTATIN CALCIUM 40 MG: 40 TABLET, FILM COATED ORAL at 17:05

## 2021-02-09 RX ADMIN — FLUDROCORTISONE ACETATE 0.1 MG: 0.1 TABLET ORAL at 13:37

## 2021-02-09 RX ADMIN — DILTIAZEM HYDROCHLORIDE 360 MG: 360 CAPSULE, EXTENDED RELEASE ORAL at 15:42

## 2021-02-09 RX ADMIN — HEPARIN SODIUM 5000 UNITS: 5000 INJECTION, SOLUTION INTRAVENOUS; SUBCUTANEOUS at 23:24

## 2021-02-09 RX ADMIN — DIGOXIN 125 MCG: 125 TABLET ORAL at 17:05

## 2021-02-09 RX ADMIN — SODIUM CHLORIDE 500 ML: 9 INJECTION, SOLUTION INTRAVENOUS at 21:02

## 2021-02-09 RX ADMIN — TAMSULOSIN HYDROCHLORIDE 0.4 MG: 0.4 CAPSULE ORAL at 13:37

## 2021-02-09 RX ADMIN — FUROSEMIDE 20 MG: 20 TABLET ORAL at 13:38

## 2021-02-09 RX ADMIN — INSULIN GLARGINE 15 UNITS: 100 INJECTION, SOLUTION SUBCUTANEOUS at 17:04

## 2021-02-09 RX ADMIN — SODIUM CHLORIDE: 9 INJECTION, SOLUTION INTRAVENOUS at 21:48

## 2021-02-09 RX ADMIN — METOPROLOL SUCCINATE 25 MG: 25 TABLET, EXTENDED RELEASE ORAL at 13:37

## 2021-02-09 RX ADMIN — MIDODRINE HYDROCHLORIDE 5 MG: 5 TABLET ORAL at 21:51

## 2021-02-09 RX ADMIN — HEPARIN SODIUM 5000 UNITS: 5000 INJECTION, SOLUTION INTRAVENOUS; SUBCUTANEOUS at 13:38

## 2021-02-09 RX ADMIN — ATROPINE SULFATE 0.5 MG: 0.1 INJECTION, SOLUTION ENDOTRACHEAL; INTRAMUSCULAR; INTRAVENOUS; SUBCUTANEOUS at 22:40

## 2021-02-09 RX ADMIN — MODAFINIL 100 MG: 100 TABLET ORAL at 13:37

## 2021-02-09 RX ADMIN — LISINOPRIL 10 MG: 10 TABLET ORAL at 13:38

## 2021-02-09 ASSESSMENT — ENCOUNTER SYMPTOMS
EYE DISCHARGE: 0
HALLUCINATIONS: 0
FEVER: 0
HEMOPTYSIS: 0
DIZZINESS: 1
SENSORY CHANGE: 0
ABDOMINAL PAIN: 0
COUGH: 0
VOMITING: 0
PALPITATIONS: 0
SPEECH CHANGE: 0
NAUSEA: 0
EYES NEGATIVE: 1
DIZZINESS: 0
DOUBLE VISION: 0
DEPRESSION: 0
CHILLS: 0
CONSTITUTIONAL NEGATIVE: 1
HEARTBURN: 0
BRUISES/BLEEDS EASILY: 0
WEAKNESS: 1
BLURRED VISION: 0
SHORTNESS OF BREATH: 0
FLANK PAIN: 0
MYALGIAS: 0
FOCAL WEAKNESS: 0

## 2021-02-09 ASSESSMENT — COGNITIVE AND FUNCTIONAL STATUS - GENERAL
MOBILITY SCORE: 20
STANDING UP FROM CHAIR USING ARMS: A LITTLE
DRESSING REGULAR LOWER BODY CLOTHING: A LITTLE
WALKING IN HOSPITAL ROOM: A LITTLE
CLIMB 3 TO 5 STEPS WITH RAILING: A LITTLE
SUGGESTED CMS G CODE MODIFIER DAILY ACTIVITY: CI
DAILY ACTIVITIY SCORE: 23
MOVING FROM LYING ON BACK TO SITTING ON SIDE OF FLAT BED: A LITTLE
SUGGESTED CMS G CODE MODIFIER MOBILITY: CJ

## 2021-02-09 ASSESSMENT — PATIENT HEALTH QUESTIONNAIRE - PHQ9
2. FEELING DOWN, DEPRESSED, IRRITABLE, OR HOPELESS: NOT AT ALL
SUM OF ALL RESPONSES TO PHQ9 QUESTIONS 1 AND 2: 0
1. LITTLE INTEREST OR PLEASURE IN DOING THINGS: NOT AT ALL
2. FEELING DOWN, DEPRESSED, IRRITABLE, OR HOPELESS: NOT AT ALL
SUM OF ALL RESPONSES TO PHQ9 QUESTIONS 1 AND 2: 0
1. LITTLE INTEREST OR PLEASURE IN DOING THINGS: NOT AT ALL

## 2021-02-09 ASSESSMENT — LIFESTYLE VARIABLES
TOTAL SCORE: 0
HOW MANY TIMES IN THE PAST YEAR HAVE YOU HAD 5 OR MORE DRINKS IN A DAY: 0
HAVE PEOPLE ANNOYED YOU BY CRITICIZING YOUR DRINKING: NO
EVER HAD A DRINK FIRST THING IN THE MORNING TO STEADY YOUR NERVES TO GET RID OF A HANGOVER: NO
TOTAL SCORE: 0
HAVE YOU EVER FELT YOU SHOULD CUT DOWN ON YOUR DRINKING: NO
SUBSTANCE_ABUSE: 0
ALCOHOL_USE: NO
TOTAL SCORE: 0
CONSUMPTION TOTAL: NEGATIVE
AVERAGE NUMBER OF DAYS PER WEEK YOU HAVE A DRINK CONTAINING ALCOHOL: 0
EVER FELT BAD OR GUILTY ABOUT YOUR DRINKING: NO
ON A TYPICAL DAY WHEN YOU DRINK ALCOHOL HOW MANY DRINKS DO YOU HAVE: 0

## 2021-02-09 ASSESSMENT — PAIN DESCRIPTION - PAIN TYPE: TYPE: ACUTE PAIN

## 2021-02-09 ASSESSMENT — FIBROSIS 4 INDEX
FIB4 SCORE: 1.71
FIB4 SCORE: 1.32

## 2021-02-09 NOTE — ASSESSMENT & PLAN NOTE
Resolved, has been hypotensive this admission in setting of secondary adrenal insufficiency  Continue midodrine

## 2021-02-09 NOTE — ED NOTES
[facility]  Pt report he gave wallet to [facility] prior to leaving home.  Medic reports placing it on back of EMS gurney but not there upon arrival.  EMS checked ambulance and bay, unable to locate.  Pt provided [facility] phone number by medic to help locate wallet.  Pt very anxious and concerned about wallet at this time.  Pt reports leaving dentures at home.

## 2021-02-09 NOTE — ED NOTES
Pt has LUQ colostomy placed in Dec 2020 due to obstruction.  Pt presents covered in stool on shirt, shorts and abdomen.  Abdominal incision healing, however reddened skin throughout abdomen with drainage on small wound.

## 2021-02-09 NOTE — CARE PLAN
Problem: Communication  Goal: The ability to communicate needs accurately and effectively will improve  Outcome: PROGRESSING AS EXPECTED     Problem: Safety  Goal: Will remain free from injury  Outcome: PROGRESSING AS EXPECTED  Goal: Will remain free from falls  Outcome: PROGRESSING AS EXPECTED     Problem: Infection  Goal: Will remain free from infection  Outcome: PROGRESSING AS EXPECTED     Problem: Venous Thromboembolism (VTW)/Deep Vein Thrombosis (DVT) Prevention:  Goal: Patient will participate in Venous Thrombosis (VTE)/Deep Vein Thrombosis (DVT)Prevention Measures  Outcome: PROGRESSING AS EXPECTED     Problem: Bowel/Gastric:  Goal: Normal bowel function is maintained or improved  Outcome: PROGRESSING AS EXPECTED  Goal: Will not experience complications related to bowel motility  Outcome: PROGRESSING AS EXPECTED     Problem: Knowledge Deficit  Goal: Knowledge of disease process/condition, treatment plan, diagnostic tests, and medications will improve  Outcome: PROGRESSING AS EXPECTED  Goal: Knowledge of the prescribed therapeutic regimen will improve  Outcome: PROGRESSING AS EXPECTED     Problem: Discharge Barriers/Planning  Goal: Patient's continuum of care needs will be met  Outcome: PROGRESSING AS EXPECTED     Problem: Fluid Volume:  Goal: Will maintain balanced intake and output  Outcome: PROGRESSING AS EXPECTED     Problem: Psychosocial Needs:  Goal: Level of anxiety will decrease  Outcome: PROGRESSING AS EXPECTED

## 2021-02-09 NOTE — DISCHARGE PLANNING
Assessment copied from last admission on 2/2/2021. Patient received a FWW from [facility] and Home Health referral was sent to [facility]. Per notes patient was discharged at that time to [facility].       Care Transition Team Assessment        Information Source  Orientation : Oriented x 4  Information Given By: Patient  Informant's Name: (Sebastián Castro)  Who is responsible for making decisions for patient? : Other  Name(s) of Primary Decision Maker: Patient currently on a legal hold     Readmission Evaluation  Is this a readmission?: No     Elopement Risk  Legal Hold: Elopement Risk  Time of Legal Hold: 1225  Date of Legal Hold: 02/01/21  Elopement Risk: Not at Risk for Elopement  Wanderguard On: No (See Comments)  Personal Belongings: Hospital Clothing Only  Environmental Precautions: Sharp or Dangerous Items Removed     Interdisciplinary Discharge Planning  Patient or legal guardian wants to designate a caregiver: No     Discharge Preparedness  What is your plan after discharge?: Group home, Other (comment)  What are your discharge supports?: Other (comment)([facility] Care staff)  Prior Functional Level: Ambulatory, Needs Assist with Activities of Daily Living, Needs Assist with Medication Management, Uses Walker  Difficulity with ADLs: Bathing, Walking  Difficulity with IADLs: Cooking, Driving, Keeping track of finances, Laundry, Managing medication, Shopping, Using the telephone or computer  Difficulity with IADL Comments: (patient has a rent payee, lives at a group home)     Functional Assesment  Prior Functional Level: Ambulatory, Needs Assist with Activities of Daily Living, Needs Assist with Medication Management, Uses Walker     Finances  Financial Barriers to Discharge: No  Prescription Coverage: Yes                 Advance Directive  Advance Directive?: None  Advance Directive offered?: AD Booklet refused     Domestic Abuse  Have you ever been the victim of abuse or violence?: No           Discharge  Risks or Barriers  Discharge risks or barriers?: Mental health, Other (comment)(on Legal hold)  Patient risk factors: Bariatric, Cognitive / sensory / physical deficit, Lack of outside supports, Mental health, Vulnerable adult     Anticipated Discharge Information  Discharge Disposition: Still a Patient (30)  Discharge Address: (TBD)  Discharge Contact Phone Number: ([phone])

## 2021-02-09 NOTE — ASSESSMENT & PLAN NOTE
Controlled given co-morbidities. A1c 7.8% Nov 2020  -continue home Lantus 15U qhs  -ISS  -hypoglycemic protocol  -diabetic diet  -diabetes education  -repeat a1c

## 2021-02-09 NOTE — ASSESSMENT & PLAN NOTE
Await new echocardiogram  meds held due to lower BP.  Elevated BNP  Hx of HFrEF 30-35%  2/10 Echo:   Compared to the images of the study done on 11/11/2020 - there has been   normalization of ejection fraction previously 35%.  Normal left ventricular systolic function.  Left ventricular ejection fraction is visually estimated to be 60%.  Diastolic function is difficult to assess with atrial fibrillation.  Normal right ventricular systolic function.  Mild  mitral regurgitation.

## 2021-02-09 NOTE — ED TRIAGE NOTES
"Chief Complaint   Patient presents with   • Syncope     X1 this morning when standing up to go to bathroom      PT OLESYA CHRISTENSEN from home with above complaint.  Pt reports syncopal episode this morning when getting up to go to the bathroom from bed.  Pt states he fell to ground and is reporting new lower back pain.  Pt reports being lightheaded when standing for a while now and seen a few weeks ago for similar symptoms.  Pt recently started on Metoprolol and Lisinopril yesterday.  Pt reports being \"lightheaded\" while lying on gurney.  Pt denies BT or hitting head upon fall.    /80   Pulse 90   Temp 36.1 °C (96.9 °F) (Temporal)   Resp 16   Ht 1.829 m (6')   Wt (!) 131 kg (289 lb)   SpO2 96%   BMI 39.20 kg/m²     "

## 2021-02-09 NOTE — H&P
Hospital Medicine History & Physical Note    Date of Service  2/9/2021    Primary Care Physician  ELIZABETH Terrazas    Consultants  none    Code Status  Full Code    Chief Complaint  Chief Complaint   Patient presents with   • Syncope     X1 this morning when standing up to go to bathroom       History of Presenting Illness  56 y.o. male who presented 2/9/2021 with past medical history of atrial fibrillation, congestive heart failure, diabetes, diabetic neuropathy, hyperlipidemia, hypertension, history of orthostatic hypotension who presents to the hospital with syncope.  This patient has a known history of orthostatic hypotension and is currently on fludrocortisone.  He states this morning he was standing up to go to the bathroom and had an event of syncope.  Since that time he had significant dizziness.  He is unable unable to stand due to his dizziness.  He states he is unable to care for himself.  Otherwise he has no known alleviating or exacerbating factors to his symptoms.  Denies any palpitations, no chest pain, no shortness of breath.  Him admitted to the hospital for further syncope work-up.    Review of Systems  Review of Systems   Constitutional: Positive for malaise/fatigue. Negative for chills and fever.   HENT: Negative for congestion, hearing loss and tinnitus.    Eyes: Negative for blurred vision, double vision and discharge.   Respiratory: Negative for cough, hemoptysis and shortness of breath.    Cardiovascular: Negative for chest pain, palpitations and leg swelling.   Gastrointestinal: Negative for abdominal pain, heartburn, nausea and vomiting.   Genitourinary: Negative for dysuria and flank pain.   Musculoskeletal: Negative for joint pain and myalgias.   Skin: Negative for rash.   Neurological: Positive for dizziness and weakness. Negative for sensory change, speech change and focal weakness.   Endo/Heme/Allergies: Negative for environmental allergies. Does not bruise/bleed easily.    Psychiatric/Behavioral: Negative for depression, hallucinations and substance abuse.       Past Medical History   has a past medical history of A-fib ([facility]), Abscess (11/8/2020), Bacteremia (11/8/2020), Chest pain, Congestive heart failure ([facility]), Diabetes ([facility]), Diabetic neuropathy ([facility]), Hypercholesterolemia, Hypertension, Longstanding persistent atrial fibrillation ([facility]) (11/8/2020), LV dysfunction (11/8/2020), Morbid obesity ([facility]), NICM (nonischemic cardiomyopathy) ([facility]) (11/8/2020), Noncompliance, Normal cardiac stress test, Orthostatic hypotension, and Sepsis ([facility]) (11/8/2020).    Surgical History   has a past surgical history that includes toe amputation (Right, 11/10/2017); toe amputation (Right, 1/17/2018); irrigation & debridement ortho (Right, 2/19/2018); zzz cardiac cath (07/21/2018); and colectomy (N/A, 11/10/2020).     Family History  family history includes No Known Problems in his father and mother.     Social History   reports that he has never smoked. He has never used smokeless tobacco. He reports that he does not drink alcohol or use drugs.    Allergies  Allergies   Allergen Reactions   • Daptomycin Rash     Body rash  RXN=1/2018     • Pcn [Penicillins] Hives and Rash      Rash & hives  RXN=since a kid  Tolerates cephalosporins   • Unasyn [Ampicillin-Sulbactam Sodium] Hives, Itching and Swelling   • Vancomycin Rash     Red man syndrome. Tolerates IV vancomycin at reduced infusion rate.       Medications  Prior to Admission Medications   Prescriptions Last Dose Informant Patient Reported? Taking?   DILTIAZem CD (CARDIZEM CD) 360 MG CAPSULE SR 24 HR 2/8/2021 at PM Patient No No   Sig: Take 1 Cap by mouth every morning for 30 days.   atorvastatin (LIPITOR) 40 MG Tab 2/8/2021 at PM Patient No No   Sig: Take 1 Tablet by mouth every evening for 30 days.   digoxin (LANOXIN) 125 MCG Tab 2/8/2021 at PM Patient No No   Sig: Take 1 Tablet by mouth every day for 30 days.   fludrocortisone (FLORINEF) 0.1 MG  Tab 2/8/2021 at AM Patient No No   Sig: Take 1 Tablet by mouth every morning for 30 days.   furosemide (LASIX) 20 MG Tab 2/8/2021 at AM Patient No No   Sig: Take 1 Tablet by mouth every day for 30 days.   insulin glargine (BASAGLAR KWIKPEN) 100 UNIT/ML injection PEN 2/8/2021 at PM Patient No No   Sig: Inject 15 Units under the skin every evening for 30 days.   levothyroxine (SYNTHROID) 112 MCG Tab 2/8/2021 at AM Patient No No   Sig: Take 1 Tablet by mouth Every morning on an empty stomach for 30 days.   lisinopril (PRINIVIL) 10 MG Tab 2/8/2021 at AM Patient Yes Yes   Sig: Take 10 mg by mouth every day.   loratadine (CLARITIN) 10 MG Tab 2/8/2021 at AM Patient No No   Sig: Take 1 Tabletby mouth every morning for 30 days.   metoprolol SR (TOPROL XL) 25 MG TABLET SR 24 HR 2/8/2021 at AM Patient Yes Yes   Sig: Take 25 mg by mouth every day.   modafinil (PROVIGIL) 100 MG Tab 2/8/2021 at AM Patient No No   Sig: Take 1 Tablet by mouth every morning for 30 days.   risperiDONE (RISPERDAL) 2 MG Tab 2/8/2021 at PM Patient No No   Sig: Take 1 Tablet by mouth 2 times a day for 30 days.   tamsulosin (FLOMAX) 0.4 MG capsule 2/8/2021 at AM Patient No No   Sig: Take 1 Capsule by mouth 1/2 hour after breakfast for 30 days.      Facility-Administered Medications: None       Physical Exam  Temp:  [36.1 °C (96.9 °F)] 36.1 °C (96.9 °F)  Pulse:  [] 116  Resp:  [16] 16  BP: (124-136)/(80-86) 136/86  SpO2:  [93 %-96 %] 93 %    Physical Exam  Vitals signs reviewed.   Constitutional:       General: He is not in acute distress.     Appearance: He is ill-appearing.   HENT:      Head: Normocephalic and atraumatic.      Nose: No congestion.      Mouth/Throat:      Mouth: Mucous membranes are dry.   Eyes:      Extraocular Movements: Extraocular movements intact.      Pupils: Pupils are equal, round, and reactive to light.   Neck:      Musculoskeletal: Neck supple.   Cardiovascular:      Rate and Rhythm: Normal rate and regular rhythm.       Pulses: Normal pulses.      Heart sounds: Normal heart sounds.   Pulmonary:      Effort: Pulmonary effort is normal. No respiratory distress.      Breath sounds: Normal breath sounds. No wheezing.   Abdominal:      General: Bowel sounds are normal. There is no distension.      Palpations: Abdomen is soft.      Tenderness: There is no abdominal tenderness.   Musculoskeletal:         General: No swelling.   Skin:     General: Skin is warm and dry.      Capillary Refill: Capillary refill takes 2 to 3 seconds.   Neurological:      General: No focal deficit present.      Mental Status: He is alert and oriented to person, place, and time. Mental status is at baseline.   Psychiatric:         Mood and Affect: Mood normal.         Behavior: Behavior normal.         Thought Content: Thought content normal.         Judgment: Judgment normal.         Laboratory:  Recent Labs     02/09/21  0810   WBC 11.4*   RBC 5.36   HEMOGLOBIN 15.6   HEMATOCRIT 47.8   MCV 89.2   MCH 29.1   MCHC 32.6*   RDW 45.9   PLATELETCT 229   MPV 10.7     Recent Labs     02/09/21  0810   SODIUM 139   POTASSIUM 4.3   CHLORIDE 100   CO2 27   GLUCOSE 151*   BUN 15   CREATININE 1.17   CALCIUM 9.7     Recent Labs     02/09/21  0810   ALTSGPT 40   ASTSGOT 33   ALKPHOSPHAT 108*   TBILIRUBIN 0.5   GLUCOSE 151*     Recent Labs     02/09/21  0810   APTT 29.6   INR 1.08     No results for input(s): NTPROBNP in the last 72 hours.      Recent Labs     02/09/21  0810   TROPONINT 67*       Imaging:  DX-LUMBAR SPINE-2 OR 3 VIEWS   Final Result      Moderate compression deformity of T11 is new compared to 2018.      Mild wedge deformity of T12 is unchanged.      Degenerative changes including facet arthropathy.      Mild retrolisthesis of L5 on S1 and L3 on L4.         EC-ECHOCARDIOGRAM COMPLETE W/O CONT    (Results Pending)         Assessment/Plan:  I anticipate this patient is appropriate for observation status at this time.    * Syncope  Assessment & Plan  Patient with  syncopal event this am after standing to use the restroom   Suspect likely orthostasis   Cont home fludricortisone   Check orthostatics, if + may need to adjust BP medications  Serial troponin, mildly elevated above baseline   Echocardiogram   continous cardiac monioring    Orthostatic hypotension- (present on admission)  Assessment & Plan  Known hx of on fludricortisone   Recheck orthostatics       Hypothyroidism- (present on admission)  Assessment & Plan  Resume home levothyroxine    Adrenal insufficiency (HCC)- (present on admission)  Assessment & Plan  History of, in stress state   Check am cortisol     Chronic systolic heart failure (HCC)- (present on admission)  Assessment & Plan  Resume home medications  BB, ace, lasix     Essential hypertension- (present on admission)  Assessment & Plan  Resume home anti hypertensive medications    Type 2 diabetes mellitus with hyperglycemia, without long-term current use of insulin ([facility])- (present on admission)  Assessment & Plan  Resume home lantus   SSI ordered   accu checks    Elevated troponin  Assessment & Plan  Mildly above baseline   Cont to trend     AF (atrial fibrillation) ([facility])  Assessment & Plan  Resume home cardizem, digoxin  Not on anticoagulation  Cardiac monitoring    Mixed hyperlipidemia- (present on admission)  Assessment & Plan  Resume home statin therapy     Ppx: lovenox

## 2021-02-09 NOTE — PROGRESS NOTES
2 RN Skin Check    2 RN skin check complete with Talya  Devices in place:colostomy  Skin assessed under devices: no.  Confirmed pressure ulcers found on: No  New potential pressure ulcers noted on no. Wound consult placed Yes.  The following interventions in place Barrier cream.    Pt sacrum is red but blanchable. Pt has a midline incision from colostomy which is red. Moisture dermitis in groin inner thigh area.

## 2021-02-09 NOTE — ASSESSMENT & PLAN NOTE
CHADSVASC 3 (HTN, CHF, DM). Rate controlled. TTE Feb 2021: EF 60%,   -hold BB, CCB at this time due to bradycardia on admission and hypotension  -Cards restarted digoxin  -continue telemetry  -goal HR <110 at rest  -keep Mg>2, K>4  -previously on Xarelto and stopped 2019 due to fall risk; reevaluate prior to discharge if will restart on anticoagulation. Continue on therapeutic Lovenox at this time.

## 2021-02-09 NOTE — ED PROVIDER NOTES
ED Provider Note    CHIEF COMPLAINT  Dizziness and falling    HPI  Sebastián Castro is a 56 y.o. male who presents with dizziness and falling.  The patient states his been feeling dizzy for weeks to months.  He feels dizzy when he is just sitting still.  However also is worse when he is stands up.  He was recently in hospital for suicidal ideation with psychotic features.  He was also generally weak at that time.  He subsequent got better from that.  Was discharged home.  But still feels he cannot care for himself.  Today he got dizzy upon standing to go to the bathroom, and fell injuring his low back.  He describes falling forwards and catching himself but wrenching his back as he did so.  The patient most recently had a echo back in November which showed EF 30%.  Sounds like he was a started on some new antihypertensives by his primary doctor, however, he has been dizzy prior to this as well.  No fever or chills.  No leg pain or swelling.  Other than his lower back, he denies anything else hurting or any other injury.  No chest pain or shortness of breath.  No cough or cold symptoms.  There is no other complaint.  Also carries a history of nonischemic cardiomyopathy as well as orthostatic hypotension.    PAST MEDICAL HISTORY  Past Medical History:   Diagnosis Date   • A-fib ([facility])    • Abscess 11/8/2020   • Bacteremia 11/8/2020   • Chest pain    • Congestive heart failure ([facility])    • Diabetes ([facility])    • Diabetic neuropathy ([facility])    • Hypercholesterolemia    • Hypertension    • Longstanding persistent atrial fibrillation ([facility]) 11/8/2020   • LV dysfunction 11/8/2020   • Morbid obesity ([facility])    • NICM (nonischemic cardiomyopathy) ([facility]) 11/8/2020   • Noncompliance    • Normal cardiac stress test    • Orthostatic hypotension    • Sepsis ([facility]) 11/8/2020       FAMILY HISTORY  Family History   Problem Relation Age of Onset   • No Known Problems Mother    • No Known Problems Father        SOCIAL HISTORY  Social History      Tobacco Use   • Smoking status: Never Smoker   • Smokeless tobacco: Never Used   Substance Use Topics   • Alcohol use: No   • Drug use: No         SURGICAL HISTORY  Past Surgical History:   Procedure Laterality Date   • COLECTOMY N/A 11/10/2020    Procedure: COLECTOMY-SEGMENTAL, COLOSTOMY, MOBILIZATION OF SPLENIC FLEXURE, WOUND VAC PLACEMENT, IRRIGATION AND DEBRIDMENT FLANK WOUND;  Surgeon: Kin Desouza D.O.;  Location: SURGERY [facility];  Service: General   • ZZZ CARDIAC CATH  07/21/2018    EF 45%, normal coronaries.   • IRRIGATION & DEBRIDEMENT ORTHO Right 2/19/2018    Procedure: IRRIGATION & DEBRIDEMENT ORTHO-FOOT;  Surgeon: Kirby Lopez M.D.;  Location: SURGERY [facility];  Service: Orthopedics   • TOE AMPUTATION Right 1/17/2018    Procedure: TOE AMPUTATION-1ST RAY;  Surgeon: Doug Delong M.D.;  Location: SURGERY [facility];  Service: Orthopedics   • TOE AMPUTATION Right 11/10/2017    Procedure: TOE AMPUTATION;  Surgeon: Osorio Leo M.D.;  Location: SURGERY [facility];  Service: Orthopedics       CURRENT MEDICATIONS  Home Medications     Reviewed by Cyn Camacho R.N. (Registered Nurse) on 02/09/21 at 0755  Med List Status: Partial   Medication Last Dose Status   atorvastatin (LIPITOR) 40 MG Tab  Active   digoxin (LANOXIN) 125 MCG Tab  Active   DILTIAZem CD (CARDIZEM CD) 360 MG CAPSULE SR 24 HR  Active   fludrocortisone (FLORINEF) 0.1 MG Tab  Active   furosemide (LASIX) 20 MG Tab  Active   insulin glargine (BASAGLAR KWIKPEN) 100 UNIT/ML injection PEN  Active   levothyroxine (SYNTHROID) 112 MCG Tab  Active   lisinopril (PRINIVIL) 10 MG Tab  Active   loratadine (CLARITIN) 10 MG Tab  Active   metoprolol SR (TOPROL XL) 25 MG TABLET SR 24 HR  Active   modafinil (PROVIGIL) 100 MG Tab  Active   risperiDONE (RISPERDAL) 2 MG Tab  Active   tamsulosin (FLOMAX) 0.4 MG capsule  Active                I have reviewed the nurses notes and/or the list brought with the  patient.    ALLERGIES  Allergies   Allergen Reactions   • Daptomycin Rash     Body rash  RXN=1/2018     • Pcn [Penicillins] Hives and Rash      Rash & hives  RXN=since a kid  Tolerates cephalosporins   • Unasyn [Ampicillin-Sulbactam Sodium] Hives, Itching and Swelling   • Vancomycin Rash     Red man syndrome. Tolerates IV vancomycin at reduced infusion rate.       REVIEW OF SYSTEMS  See HPI for further details. Review of systems as above, otherwise all other systems are negative.     PHYSICAL EXAM  VITAL SIGNS: /86   Pulse (!) 116   Temp 36.1 °C (96.9 °F) (Temporal)   Resp 16   Ht 1.829 m (6')   Wt (!) 131 kg (289 lb)   SpO2 93%   BMI 39.20 kg/m²     Constitutional: Well appearing patient in no acute distress.  Not toxic, nor ill in appearance.  HENT: Mucus membranes moist.  Oropharynx is clear.  Eyes: Pupils equally round.  No scleral icterus.   Neck: Full nontender range of motion.  Lymphatic: No cervical lymphadenopathy noted.   Cardiovascular: Irregular irregular, A. fib on the monitor between 90 and 110.  No murmurs, rubs, nor gallop appreciated.   Thorax & Lungs: Chest is nontender.  Lungs are clear to auscultation with good air movement bilaterally.  No wheeze, rhonchi, nor rales.   Abdomen: Soft, with no tenderness, rebound nor guarding.  No mass, pulsatile mass, nor hepatosplenomegaly appreciated.  Skin: No purpura nor petechia noted.  Extremities/Musculoskeletal: No sign of trauma.  C however diffusely tender across the mid to lower lumbar spine.  burks are nontender with no cords nor edema.  No Phuc's sign.  Pulses are intact all around.   Neurologic: Alert & oriented.  Strength and sensation is intact all around.   Psychiatric: Normal affect appropriate for the clinical situation.    EKG  I interpreted this EKG myself.  This is a 12-lead study.  The rhythm is atrial fibrillation with a rate of 99.  There are nonspecific ST-T wave abnormalities.  Interpretation: No ST segment elevation  myocardial infarction.    LABS  Labs Reviewed   CBC WITH DIFFERENTIAL - Abnormal; Notable for the following components:       Result Value    WBC 11.4 (*)     MCHC 32.6 (*)     Neutrophils-Polys 74.40 (*)     Lymphocytes 15.20 (*)     Neutrophils (Absolute) 8.48 (*)     All other components within normal limits    Narrative:     Indicate which anticoagulants the patient is on:->UNKNOWN   COMP METABOLIC PANEL - Abnormal; Notable for the following components:    Glucose 151 (*)     Alkaline Phosphatase 108 (*)     Globulin 3.8 (*)     All other components within normal limits    Narrative:     Indicate which anticoagulants the patient is on:->UNKNOWN   TROPONIN - Abnormal; Notable for the following components:    Troponin T 67 (*)     All other components within normal limits    Narrative:     Indicate which anticoagulants the patient is on:->UNKNOWN   PROTHROMBIN TIME    Narrative:     Indicate which anticoagulants the patient is on:->UNKNOWN   APTT    Narrative:     Indicate which anticoagulants the patient is on:->UNKNOWN   ESTIMATED GFR    Narrative:     Indicate which anticoagulants the patient is on:->UNKNOWN         RADIOLOGY/PROCEDURES  I have reviewed the patient's film interpretations myself, and they are read out by the radiologist as:   DX-LUMBAR SPINE-2 OR 3 VIEWS   Final Result      Moderate compression deformity of T11 is new compared to 2018.      Mild wedge deformity of T12 is unchanged.      Degenerative changes including facet arthropathy.      Mild retrolisthesis of L5 on S1 and L3 on L4.           .    MEDICAL RECORD  I have reviewed patient's medical record and pertinent results are listed above.    COURSE & MEDICAL DECISION MAKING  I have reviewed any medical record information, laboratory studies and radiographic results as noted above.  This a patient with pre-existing orthostatic hypotension as a diagnosis, presents with dizziness which sounds postural.  On the other hand, he states that is  dizzy during my H&P, sitting recumbent in the bed with a normal blood pressure.    By the [place] syncope score he is low risk.  However by the [place] syncope criteria is not low risk.    Laboratories do show a somewhat worse troponin than typical.  I would like to trend this in the hospital.  Glucose is noted slightly high.  His x-rays as noted above shows some chronic changes.  Compression deformities of T11 and T12, however he is not tender here.    At this point, I discussed the patient's case with Dr. Phillips, the [facility] hospitalist.  He will be putting the patient in for observation overnight.    FINAL IMPRESSION  1. Dizziness    2. Longstanding persistent atrial fibrillation (HCC)    3. Elevated troponin           This dictation was created using voice recognition software.    Electronically signed by: Ronaldo Palmer M.D., 2/9/2021 8:14 AM

## 2021-02-10 ENCOUNTER — APPOINTMENT (OUTPATIENT)
Dept: CARDIOLOGY | Facility: MEDICAL CENTER | Age: 56
DRG: 981 | End: 2021-02-10
Attending: HOSPITALIST
Payer: MEDICAID

## 2021-02-10 LAB
CORTIS SERPL-MCNC: 6.2 UG/DL (ref 0–23)
GLUCOSE BLD-MCNC: 151 MG/DL (ref 65–99)
GLUCOSE BLD-MCNC: 153 MG/DL (ref 65–99)
GLUCOSE BLD-MCNC: 225 MG/DL (ref 65–99)
LV EJECT FRACT  99904: 60
LV EJECT FRACT MOD 2C 99903: 59.01
LV EJECT FRACT MOD 4C 99902: 70.98
LV EJECT FRACT MOD BP 99901: 65.51

## 2021-02-10 PROCEDURE — 93306 TTE W/DOPPLER COMPLETE: CPT

## 2021-02-10 PROCEDURE — 700102 HCHG RX REV CODE 250 W/ 637 OVERRIDE(OP): Performed by: HOSPITALIST

## 2021-02-10 PROCEDURE — 770001 HCHG ROOM/CARE - MED/SURG/GYN PRIV*

## 2021-02-10 PROCEDURE — 82962 GLUCOSE BLOOD TEST: CPT

## 2021-02-10 PROCEDURE — 700111 HCHG RX REV CODE 636 W/ 250 OVERRIDE (IP): Performed by: STUDENT IN AN ORGANIZED HEALTH CARE EDUCATION/TRAINING PROGRAM

## 2021-02-10 PROCEDURE — 99233 SBSQ HOSP IP/OBS HIGH 50: CPT | Performed by: INTERNAL MEDICINE

## 2021-02-10 PROCEDURE — 700111 HCHG RX REV CODE 636 W/ 250 OVERRIDE (IP): Performed by: HOSPITALIST

## 2021-02-10 PROCEDURE — A9270 NON-COVERED ITEM OR SERVICE: HCPCS | Performed by: HOSPITALIST

## 2021-02-10 PROCEDURE — 93306 TTE W/DOPPLER COMPLETE: CPT | Mod: 26 | Performed by: INTERNAL MEDICINE

## 2021-02-10 PROCEDURE — 99233 SBSQ HOSP IP/OBS HIGH 50: CPT | Performed by: HOSPITALIST

## 2021-02-10 RX ORDER — COSYNTROPIN 0.25 MG/ML
250 INJECTION, POWDER, FOR SOLUTION INTRAMUSCULAR; INTRAVENOUS ONCE
Status: COMPLETED | OUTPATIENT
Start: 2021-02-11 | End: 2021-02-11

## 2021-02-10 RX ORDER — NYSTATIN 100000 [USP'U]/G
POWDER TOPICAL 2 TIMES DAILY
Status: DISPENSED | OUTPATIENT
Start: 2021-02-10 | End: 2021-02-17

## 2021-02-10 RX ADMIN — HYDROCORTISONE SODIUM SUCCINATE 125 MG: 250 INJECTION, POWDER, FOR SOLUTION INTRAMUSCULAR; INTRAVENOUS at 15:33

## 2021-02-10 RX ADMIN — NYSTATIN: 100000 POWDER TOPICAL at 18:00

## 2021-02-10 RX ADMIN — INSULIN HUMAN 2 UNITS: 100 INJECTION, SOLUTION PARENTERAL at 13:57

## 2021-02-10 RX ADMIN — HYDROCORTISONE SODIUM SUCCINATE 125 MG: 250 INJECTION, POWDER, FOR SOLUTION INTRAMUSCULAR; INTRAVENOUS at 05:07

## 2021-02-10 RX ADMIN — LEVOTHYROXINE SODIUM 112 MCG: 0.11 TABLET ORAL at 05:06

## 2021-02-10 RX ADMIN — HEPARIN SODIUM 5000 UNITS: 5000 INJECTION, SOLUTION INTRAVENOUS; SUBCUTANEOUS at 05:06

## 2021-02-10 RX ADMIN — ENOXAPARIN SODIUM 40 MG: 40 INJECTION SUBCUTANEOUS at 18:44

## 2021-02-10 RX ADMIN — MODAFINIL 100 MG: 100 TABLET ORAL at 05:06

## 2021-02-10 RX ADMIN — INSULIN HUMAN 3 UNITS: 100 INJECTION, SOLUTION PARENTERAL at 20:30

## 2021-02-10 RX ADMIN — INSULIN GLARGINE 15 UNITS: 100 INJECTION, SOLUTION SUBCUTANEOUS at 18:45

## 2021-02-10 RX ADMIN — ATORVASTATIN CALCIUM 40 MG: 40 TABLET, FILM COATED ORAL at 18:44

## 2021-02-10 RX ADMIN — TAMSULOSIN HYDROCHLORIDE 0.4 MG: 0.4 CAPSULE ORAL at 09:20

## 2021-02-10 RX ADMIN — HYDROCORTISONE SODIUM SUCCINATE 125 MG: 250 INJECTION, POWDER, FOR SOLUTION INTRAMUSCULAR; INTRAVENOUS at 00:42

## 2021-02-10 RX ADMIN — INSULIN HUMAN 2 UNITS: 100 INJECTION, SOLUTION PARENTERAL at 09:26

## 2021-02-10 ASSESSMENT — ENCOUNTER SYMPTOMS
DIZZINESS: 0
NAUSEA: 0
NERVOUS/ANXIOUS: 0
WEAKNESS: 1
SHORTNESS OF BREATH: 0
SORE THROAT: 0
DEPRESSION: 1
DIZZINESS: 1
ABDOMINAL PAIN: 0

## 2021-02-10 ASSESSMENT — PAIN DESCRIPTION - PAIN TYPE
TYPE: ACUTE PAIN
TYPE: ACUTE PAIN

## 2021-02-10 NOTE — PROGRESS NOTES
Assumed care at 190, bedside report received from Therese PÉREZ. Pt. Is SB on the monitor. Initial assessment completed, orders reviewed, call light within reach, bed alarm  in use, and hourly rounding in place. POC addressed with patient, no additional questions at this time.     2030 rapid team called due to hypotensive concerns. bp 68/36 HR 40s with 2.7 sec pauses. Attempts to bolus patient with 1000 L of NS, midodrine 10 mg x 2 administered with poor results. bp remains hypotensive. Hospitalist and Cardiologist to room, Atropine .5 mg given with not much change to blood pressures, remaining 75/40-60/40s hr remaining 60-40s. Decision to transfer patient to ICU for medical management . Report given at bedside to ICU nurse aSrah.  Transfer to ICU at 2300.     During rapid, patients ostomy bag exploded and was replaced. Wound care provided. Pictures uploaded and wound consulted for excoriation of skin around ostomy site, midline incision. And abdominal folds.

## 2021-02-10 NOTE — PROGRESS NOTES
Patient transferred to cardiac intensive care. Upon assessmnet patient is neurologically intact and answers all questions appropriately. Confirmed with Rebecca LONGORIA that plan for the patient tonight is to monitor mentation and only treat hypotension if patient is experiencing a decline in mental status. Patient is currently denying feeling syomptomatic, POC reviewed with patient who was instructed to inform this RN if there are any changes. POC communicated to charge RN as well. Call light within reach, safety precautions in place.

## 2021-02-10 NOTE — ASSESSMENT & PLAN NOTE
-Legal hold, awaiting acceptance at psych facility ([facility] will take in May).  -Continue paxil, klonopin & geodon.

## 2021-02-10 NOTE — PROGRESS NOTES
"Rapid response due to hypotension after receiving evening meds: digoxin and risperdal  60's/40's HR 40's consistently  Patient complains of \"seeing stars\" and \"feeling a bit light headed\" but otherwise responds to question and appears to be at baseline mental status.  Stat EKG showing afib with bradycardia.  Lungs CTA, radial pulses difficult to palpate, carotid pulse palpable, no JVD appreciated, no murmurs, gallops, rubs appreciated on cardiac auscultation.  Patient was started on risperdal 1 month ago. According to chart, was in hospital 2 weeks prior and was receiving risperdal for Suicidal ideation.  Patient is on Risperdal 2mg twice daily, receiving digoxin 125mg qhs, Lasix 20mg qam and Cardizem 360mg qam, no Digoxin level obtained yet this admission.  Recommend tapering Risperdal as 4mg daily seems excessively high or switching meds to something with less potential for hypotension and/or ekg changes, Cardiology consult in AM, ordered psych consult, Echo.  >Will give mirtazapine 5mg stat and 500ml NS bolus for now and monitor closely. Likely will need judicious additional fluids and will communicate with nursing.  >Ordered BNP, Psych consult and digoxin levels. Last digoxin level was 1.7 ~ 1 week ago which may need adjustment for CHF  ----  Update: 9:20pm: BP improved to 80's/50s with 750ml NS given so far with Midodrine 5mg stat given ~8:35pm. Cardiology consulted, IV steroids administered (patient has adrenal insufficiency), atropine administered with some improvement in blood pressure. Upgraded to ICU for closer monitoring.   "

## 2021-02-10 NOTE — PROGRESS NOTES
Hospital Medicine Daily Progress Note    Date of Service  2/10/2021    Chief Complaint  Syncope    Hospital Course  55yo male with BMI:34.6, atrial fibrillation, depression, congestive heart failure, diabetes, diabetic neuropathy, hyperlipidemia, HTN, prior orthostatic hypotension with a question of adrenal insufficiency and had been on fludrocortisone.  He was admitted after syncope in his bathroom.  He was admitted and stopped of many of his medication per cardiology and continues to be closely monitored.    Interval Problem Update  Remains with orthostatic hypotension.  States his legs are weak  He is depressed over his declining physical health    Consultants/Specialty  Cardiology  Intensivist (signed off)     Code Status  Full Code    Disposition  Transfer to telemetry floor.    Review of Systems  Review of Systems   Constitutional: Positive for malaise/fatigue.   HENT: Negative for sore throat.    Respiratory: Negative for shortness of breath.    Cardiovascular: Negative for chest pain and leg swelling.   Gastrointestinal: Negative for abdominal pain and nausea.   Genitourinary: Negative for dysuria.   Musculoskeletal: Negative for joint pain.   Neurological: Positive for dizziness and weakness.   Psychiatric/Behavioral: Positive for depression. Negative for suicidal ideas. The patient is not nervous/anxious.         Physical Exam  Temp:  [36 °C (96.8 °F)-36.8 °C (98.2 °F)] 36.5 °C (97.7 °F)  Pulse:  [] 102  Resp:  [10-22] 19  BP: ()/(25-84) 131/84  SpO2:  [92 %-100 %] 96 %    Physical Exam  Vitals reviewed.   Constitutional:       Appearance: He is obese. He is not diaphoretic.      Comments: weak   HENT:      Head: Normocephalic and atraumatic.      Nose: Nose normal.      Mouth/Throat:      Mouth: Mucous membranes are moist.      Pharynx: No oropharyngeal exudate.   Eyes:      General: No scleral icterus.        Right eye: No discharge.         Left eye: No discharge.      Extraocular Movements:  Extraocular movements intact.      Conjunctiva/sclera: Conjunctivae normal.   Cardiovascular:      Rate and Rhythm: Normal rate and regular rhythm.      Pulses:           Radial pulses are 2+ on the right side and 2+ on the left side.        Dorsalis pedis pulses are 2+ on the right side and 2+ on the left side.      Heart sounds: No murmur.   Pulmonary:      Effort: Pulmonary effort is normal. No respiratory distress.      Breath sounds: Normal breath sounds. No wheezing or rales.   Abdominal:      General: Bowel sounds are normal. There is no distension.      Palpations: Abdomen is soft.   Musculoskeletal:         General: No swelling or tenderness.      Cervical back: No muscular tenderness.      Right lower leg: Edema present.      Left lower leg: Edema present.   Lymphadenopathy:      Cervical: No cervical adenopathy.   Skin:     Coloration: Skin is not jaundiced or pale.      Comments: No obvious sign of hyperpigmentation   Neurological:      General: No focal deficit present.      Mental Status: He is alert and oriented to person, place, and time. Mental status is at baseline.      Cranial Nerves: No cranial nerve deficit.   Psychiatric:         Mood and Affect: Mood is depressed.         Behavior: Behavior normal.         Thought Content: Thought content does not include suicidal ideation.         Fluids    Intake/Output Summary (Last 24 hours) at 2/10/2021 1555  Last data filed at 2/10/2021 1500  Gross per 24 hour   Intake 1240 ml   Output 700 ml   Net 540 ml       Laboratory  Recent Labs     02/09/21  0810   WBC 11.4*   RBC 5.36   HEMOGLOBIN 15.6   HEMATOCRIT 47.8   MCV 89.2   MCH 29.1   MCHC 32.6*   RDW 45.9   PLATELETCT 229   MPV 10.7     Recent Labs     02/09/21  0810   SODIUM 139   POTASSIUM 4.3   CHLORIDE 100   CO2 27   GLUCOSE 151*   BUN 15   CREATININE 1.17   CALCIUM 9.7     Recent Labs     02/09/21  0810   APTT 29.6   INR 1.08               Imaging  DX-LUMBAR SPINE-2 OR 3 VIEWS   Final Result       Moderate compression deformity of T11 is new compared to 2018.      Mild wedge deformity of T12 is unchanged.      Degenerative changes including facet arthropathy.      Mild retrolisthesis of L5 on S1 and L3 on L4.         EC-ECHOCARDIOGRAM COMPLETE W/O CONT    (Results Pending)        Assessment/Plan  * Syncope  Assessment & Plan  Patient with syncopal event this am after standing to use the restroom   Suspect likely orthostasis   Cont home fludricortisone   Check orthostatics, if + may need to adjust BP medications  Serial troponin, mildly elevated above baseline   Echocardiogram   continous cardiac monioring    AF (atrial fibrillation) (HCC)  Assessment & Plan  Cardiology consulting  Cardiac monitoring  Holding home diltiazem, metoprolol and digoxin per cardiology recommendation.    Adrenal insufficiency (HCC)- (present on admission)  Assessment & Plan  Question as to when this was diagnosed.  Patient unaware.  Unsure if primary, secondary, or tertiary issue.  Cosyntropin stimulation test ordered for 2/11am.  Low cortisol.  Hold solucortef.    Needs outpatient endocrinology follow up.  Question if part of this is due to inmobility and if prior steroids.    Orthostatic hypotension- (present on admission)  Assessment & Plan  Known hx of on fludricortisone   Cosyntropin stimulation test, renin, Acth, cortisol levels ordered  Debility vs adrenal insufficiency  PT/OT.  Rehab vs SNF    Anxiety and depression- (present on admission)  Assessment & Plan  I have ordered for Psychology and Psychiatry to consult  Cognitive behavior treatment for coping mechanisms to help with his chronic disease and physical decline would be beneficial for him.  Cardiology has dc'd his risperdal for possible side effect of hypotension and will have Psychiatry eval for other antidepressant.  He is on modafinil outpatient.  Consideration of Paxil?    Hypothyroidism- (present on admission)  Assessment & Plan  Resume home  levothyroxine    Chronic systolic heart failure (HCC)- (present on admission)  Assessment & Plan  Await new echocardiogram  meds held due to lower BP.  Elevated BNP  Hx of HFrEF 30-35%    Essential hypertension- (present on admission)  Assessment & Plan  Resume home anti hypertensive medications    Type 2 diabetes mellitus with hyperglycemia, without long-term current use of insulin (HCC)- (present on admission)  Assessment & Plan  Lantus  A1c:7.8 in past 3 months  Monitor accuchecks and cover with SSI    Elevated troponin  Assessment & Plan  Mildly above baseline   consistant elevation over serial troponins likely CHF related.  Cont to trend     Mixed hyperlipidemia- (present on admission)  Assessment & Plan  Continue synthroid  TSH normal this month       VTE prophylaxis: enoxaparin

## 2021-02-10 NOTE — CARE PLAN
Problem: Safety  Goal: Will remain free from injury  Outcome: PROGRESSING AS EXPECTED     Problem: Psychosocial Needs:  Goal: Level of anxiety will decrease  Outcome: PROGRESSING AS EXPECTED     Problem: Fluid Volume:  Goal: Will maintain balanced intake and output  Outcome: PROGRESSING SLOWER THAN EXPECTED       Patient's blood pressure improved. Denies need to void at this time, says he doesn't void much at home, noted that he had large liquid output via his colostomy.

## 2021-02-10 NOTE — CODE DOCUMENTATION
Pt. BP 71/28, updated Dr. Murphy, this RN and Dr. Murphy updated Dr. Pedersen ([facility]), awaiting response from Dr. Pedersen.

## 2021-02-10 NOTE — CODE DOCUMENTATION
"Bolus finished, BP 78/40, Pt. Still seeing \"black spots,\" notified Dr. Murphy, NS 125mL/hr and additional 5mg Midodrine ordered, Dr. Murphy to consult ICU intensivest.    "

## 2021-02-10 NOTE — PROGRESS NOTES
RN received pt from  ED. PT was able to walk from stretcher to bed with a stand by assist. PT AAOx4, RA. Placed on tele, running afib. Colostomy checked by RN. Safety check completed, all patient needs met at this time. Will continue to monitor.

## 2021-02-10 NOTE — CARE PLAN
Problem: Bowel/Gastric:  Goal: Normal bowel function is maintained or improved  Outcome: PROGRESSING AS EXPECTED  Goal: Will not experience complications related to bowel motility  Outcome: PROGRESSING AS EXPECTED     Problem: Knowledge Deficit  Goal: Knowledge of disease process/condition, treatment plan, diagnostic tests, and medications will improve  Outcome: PROGRESSING AS EXPECTED  Goal: Knowledge of the prescribed therapeutic regimen will improve  Outcome: PROGRESSING AS EXPECTED     Problem: Fluid Volume:  Goal: Will maintain balanced intake and output  Outcome: PROGRESSING AS EXPECTED

## 2021-02-10 NOTE — CONSULTS
Cardiology Initial Consultation    Date of Service  2/9/2021    Referring Physician  Carmel Phillips M.D.    Reason for Consultation  Hypotension and bradycardia.  The patient is seen at the request of Evgeny Murphy who is the nocturnist on the hospital service for evaluation of the above problems.    History of Presenting Illness  Sebastián Castro is a 56 y.o. male who has been seen previously for atrial fibrillation.  He was seen initially in February 2018.  The patient ultimately underwent angiography that revealed no coronary artery disease.  His last cardiology visit was in October, 2019 for evaluation of orthostasis and LV dysfunction.  He has been lost to cardiology follow-up since.  His most recent echo was in September 2020 and reviewed revealed an EF of 30% with global hypokinesis.    The patient was admitted to the hospital earlier today with dizziness and falling he states he been dizzy at home for several weeks.  The patient is an active and uses a walker around the house.  He has been hypotensive during the day and also bradycardic.  In reviewing his medications, he was on high-dose diltiazem, digoxin, Risperdal, midodrine, Lasix, Florinef, metoprolol, lisinopril, and IV fluids.    Currently he is resting comfortably denies any chest pain, dizziness, or difficulty breathing.  On the monitor has been in atrial fibrillation with a slow ventricular response rate.  The patient was given 0.5 mg of atropine which resulted in transient improvement in his blood pressure and heart rate but again this was only transient and the patient     Review of Systems  Review of Systems   Constitutional: Negative.    HENT: Negative.    Eyes: Negative.    Cardiovascular: Negative for chest pain, palpitations and leg swelling.   Gastrointestinal: Negative for abdominal pain.   Neurological: Negative for dizziness.   Psychiatric/Behavioral: Positive for behavioral problems.       Past Medical History   has a past medical  history of A-fib ([facility]), Abscess (11/8/2020), Bacteremia (11/8/2020), Chest pain, Congestive heart failure ([facility]), Diabetes ([facility]), Diabetic neuropathy ([facility]), Hypercholesterolemia, Hypertension, Longstanding persistent atrial fibrillation ([facility]) (11/8/2020), LV dysfunction (11/8/2020), Morbid obesity ([facility]), NICM (nonischemic cardiomyopathy) ([facility]) (11/8/2020), Noncompliance, Normal cardiac stress test, Orthostatic hypotension, and Sepsis ([facility]) (11/8/2020).    Surgical History   has a past surgical history that includes toe amputation (Right, 11/10/2017); toe amputation (Right, 1/17/2018); irrigation & debridement ortho (Right, 2/19/2018); zzz cardiac cath (07/21/2018); and colectomy (N/A, 11/10/2020).    Family History  family history includes No Known Problems in his father and mother.    Social History   reports that he has never smoked. He has never used smokeless tobacco. He reports that he does not drink alcohol or use drugs.    Medications  Prior to Admission Medications   Prescriptions Last Dose Informant Patient Reported? Taking?   DILTIAZem CD (CARDIZEM CD) 360 MG CAPSULE SR 24 HR 2/8/2021 at PM Patient No No   Sig: Take 1 Cap by mouth every morning for 30 days.   atorvastatin (LIPITOR) 40 MG Tab 2/8/2021 at PM Patient No No   Sig: Take 1 Tablet by mouth every evening for 30 days.   digoxin (LANOXIN) 125 MCG Tab 2/8/2021 at PM Patient No No   Sig: Take 1 Tablet by mouth every day for 30 days.   fludrocortisone (FLORINEF) 0.1 MG Tab 2/8/2021 at AM Patient No No   Sig: Take 1 Tablet by mouth every morning for 30 days.   furosemide (LASIX) 20 MG Tab 2/8/2021 at AM Patient No No   Sig: Take 1 Tablet by mouth every day for 30 days.   insulin glargine (BASAGLAR KWIKPEN) 100 UNIT/ML injection PEN 2/8/2021 at PM Patient No No   Sig: Inject 15 Units under the skin every evening for 30 days.   levothyroxine (SYNTHROID) 112 MCG Tab 2/8/2021 at AM Patient No No   Sig: Take 1 Tablet by mouth Every morning on an empty stomach  for 30 days.   lisinopril (PRINIVIL) 10 MG Tab 2/8/2021 at AM Patient Yes Yes   Sig: Take 10 mg by mouth every day.   loratadine (CLARITIN) 10 MG Tab 2/8/2021 at AM Patient No No   Sig: Take 1 Tabletby mouth every morning for 30 days.   metoprolol SR (TOPROL XL) 25 MG TABLET SR 24 HR 2/8/2021 at AM Patient Yes Yes   Sig: Take 25 mg by mouth every day.   modafinil (PROVIGIL) 100 MG Tab 2/8/2021 at AM Patient No No   Sig: Take 1 Tablet by mouth every morning for 30 days.   risperiDONE (RISPERDAL) 2 MG Tab 2/8/2021 at PM Patient No No   Sig: Take 1 Tablet by mouth 2 times a day for 30 days.   tamsulosin (FLOMAX) 0.4 MG capsule 2/8/2021 at AM Patient No No   Sig: Take 1 Capsule by mouth 1/2 hour after breakfast for 30 days.      Facility-Administered Medications: None       Allergies  Allergies   Allergen Reactions   • Daptomycin Rash     Body rash  RXN=1/2018     • Pcn [Penicillins] Hives and Rash      Rash & hives  RXN=since a kid  Tolerates cephalosporins   • Unasyn [Ampicillin-Sulbactam Sodium] Hives, Itching and Swelling   • Vancomycin Rash     Red man syndrome. Tolerates IV vancomycin at reduced infusion rate.       Vital signs in last 24 hours  Temp:  [36.1 °C (96.9 °F)-36.4 °C (97.6 °F)] 36.4 °C (97.6 °F)  Pulse:  [] 53  Resp:  [12-18] 12  BP: ()/(28-86) 68/41  SpO2:  [92 %-97 %] 97 %    Physical Exam  Physical Exam  Constitutional:       General: He is not in acute distress.     Appearance: He is well-developed. He is obese. He is not diaphoretic.   HENT:      Head: Atraumatic.   Eyes:      Conjunctiva/sclera: Conjunctivae normal.      Pupils: Pupils are equal, round, and reactive to light.   Neck:      Musculoskeletal: Neck supple.      Vascular: No JVD.   Cardiovascular:      Rate and Rhythm: Bradycardia present. Rhythm regularly irregular.      Heart sounds: No murmur.   Pulmonary:      Effort: Pulmonary effort is normal. No respiratory distress.      Breath sounds: Normal breath sounds. No  wheezing or rales.   Abdominal:      Palpations: Abdomen is soft.      Tenderness: There is no abdominal tenderness.      Comments: Obese.  Colostomy in place.   Musculoskeletal:      Comments: Trace edema bilaterally   Skin:     General: Skin is warm and dry.   Neurological:      General: No focal deficit present.      Mental Status: He is alert and oriented to person, place, and time.   Psychiatric:         Mood and Affect: Affect is blunt.         Behavior: Behavior is withdrawn.         Lab Review  Lab Results   Component Value Date/Time    WBC 11.4 (H) 02/09/2021 08:10 AM    RBC 5.36 02/09/2021 08:10 AM    HEMOGLOBIN 15.6 02/09/2021 08:10 AM    HEMATOCRIT 47.8 02/09/2021 08:10 AM    MCV 89.2 02/09/2021 08:10 AM    MCH 29.1 02/09/2021 08:10 AM    MCHC 32.6 (L) 02/09/2021 08:10 AM    MPV 10.7 02/09/2021 08:10 AM      Lab Results   Component Value Date/Time    SODIUM 139 02/09/2021 08:10 AM    POTASSIUM 4.3 02/09/2021 08:10 AM    CHLORIDE 100 02/09/2021 08:10 AM    CO2 27 02/09/2021 08:10 AM    GLUCOSE 151 (H) 02/09/2021 08:10 AM    BUN 15 02/09/2021 08:10 AM    CREATININE 1.17 02/09/2021 08:10 AM      Lab Results   Component Value Date/Time    ASTSGOT 33 02/09/2021 08:10 AM    ALTSGPT 40 02/09/2021 08:10 AM     Lab Results   Component Value Date/Time    CHOLSTRLTOT 104 11/22/2018 12:07 AM    LDL 60 11/22/2018 12:07 AM    HDL 27 (A) 11/22/2018 12:07 AM    TRIGLYCERIDE 87 11/22/2018 12:07 AM    TROPONINT 56 (H) 02/09/2021 03:47 PM       Recent Labs     02/09/21  2140   NTPROBNP 4153*       Permanent atrial fibrillation: Ventricular response rate is slow.  He is on a multitude of negatively chronotropic agents including diltiazem, digoxin and beta-blockers.  Recommend stopping all of these.  He is basically asymptomatic and would monitor his heart rate closely.  He did have a limited response to low-dose atropine.  There is no indication for pacemaker at this time    Hypotension: He is on a panoply of medications.   I would recommend stopping virtually everything and reassessing him after reviewing his echo.  His most recent EF was in the 30% range and certainly high-dose diltiazem is contraindicated in this situation as it can suppress his ejection fraction even further.  There is also concerned about adrenal insufficiency which could not certainly cause bradycardia and hypotension.  Her cortisol has been ordered.  Adrenal insufficiency is mentioned in the chart although I do not see any documentation for this or recent cortisol levels drawn.  Would consider giving him a dose of steroids prophylactically.      History of nonischemic cardiomyopathy: As noted above most recent EF was in the 30% range.  Repeat echo is pending.  He is on negatively inotropic medications which have been stopped.  He has been on both Florinef and furosemide which are probably counter acting each other.  Again I would recommend stopping most of his medications and reassessing which medications he needs after the echo has been reviewed.  Appropriate heart failure medications will be layered and as blood pressure allows.    I discussed the case with nursing and also via text with the nocturnist.  The patient is not symptomatic and is not mobile.  Would not recommend any medications for pressors unless he demonstrates symptomatic hypoperfusion with mentation changes etc.  His urine output is temporarily reduced due to his hypotension.  This will be reassessed in the morning.  Please contact me with any questions.    Nate Fernandez M.D.   Cardiologist, [facility]  [phone]

## 2021-02-11 LAB
CORTIS SERPL-MCNC: 14.5 UG/DL (ref 0–23)
CORTIS SERPL-MCNC: 19.1 UG/DL (ref 0–23)
CORTIS SERPL-MCNC: 21.3 UG/DL (ref 0–23)
GLUCOSE BLD-MCNC: 125 MG/DL (ref 65–99)
GLUCOSE BLD-MCNC: 137 MG/DL (ref 65–99)

## 2021-02-11 PROCEDURE — 82962 GLUCOSE BLOOD TEST: CPT | Mod: 91

## 2021-02-11 PROCEDURE — 99223 1ST HOSP IP/OBS HIGH 75: CPT | Performed by: PSYCHIATRY & NEUROLOGY

## 2021-02-11 PROCEDURE — 99233 SBSQ HOSP IP/OBS HIGH 50: CPT | Performed by: HOSPITALIST

## 2021-02-11 PROCEDURE — 97162 PT EVAL MOD COMPLEX 30 MIN: CPT

## 2021-02-11 PROCEDURE — 700111 HCHG RX REV CODE 636 W/ 250 OVERRIDE (IP): Performed by: HOSPITALIST

## 2021-02-11 PROCEDURE — A9270 NON-COVERED ITEM OR SERVICE: HCPCS | Performed by: HOSPITALIST

## 2021-02-11 PROCEDURE — 99233 SBSQ HOSP IP/OBS HIGH 50: CPT | Performed by: INTERNAL MEDICINE

## 2021-02-11 PROCEDURE — 84244 ASSAY OF RENIN: CPT

## 2021-02-11 PROCEDURE — 82024 ASSAY OF ACTH: CPT

## 2021-02-11 PROCEDURE — 770001 HCHG ROOM/CARE - MED/SURG/GYN PRIV*

## 2021-02-11 PROCEDURE — 700102 HCHG RX REV CODE 250 W/ 637 OVERRIDE(OP): Performed by: HOSPITALIST

## 2021-02-11 PROCEDURE — 82533 TOTAL CORTISOL: CPT | Mod: 91

## 2021-02-11 PROCEDURE — 97166 OT EVAL MOD COMPLEX 45 MIN: CPT

## 2021-02-11 RX ORDER — FLUDROCORTISONE ACETATE 0.1 MG/1
0.1 TABLET ORAL EVERY MORNING
Status: DISCONTINUED | OUTPATIENT
Start: 2021-02-12 | End: 2021-02-12

## 2021-02-11 RX ORDER — FUROSEMIDE 20 MG/1
20 TABLET ORAL
Status: DISCONTINUED | OUTPATIENT
Start: 2021-02-11 | End: 2021-02-15

## 2021-02-11 RX ADMIN — DOCUSATE SODIUM 50 MG AND SENNOSIDES 8.6 MG 2 TABLET: 8.6; 5 TABLET, FILM COATED ORAL at 05:32

## 2021-02-11 RX ADMIN — DOCUSATE SODIUM 50 MG AND SENNOSIDES 8.6 MG 2 TABLET: 8.6; 5 TABLET, FILM COATED ORAL at 17:23

## 2021-02-11 RX ADMIN — ENOXAPARIN SODIUM 40 MG: 40 INJECTION SUBCUTANEOUS at 17:05

## 2021-02-11 RX ADMIN — ATORVASTATIN CALCIUM 40 MG: 40 TABLET, FILM COATED ORAL at 17:05

## 2021-02-11 RX ADMIN — COSYNTROPIN 250 MCG: 0.25 INJECTION, POWDER, LYOPHILIZED, FOR SOLUTION INTRAVENOUS at 03:01

## 2021-02-11 RX ADMIN — NYSTATIN: 100000 POWDER TOPICAL at 05:32

## 2021-02-11 RX ADMIN — INSULIN GLARGINE 15 UNITS: 100 INJECTION, SOLUTION SUBCUTANEOUS at 17:06

## 2021-02-11 RX ADMIN — MODAFINIL 100 MG: 100 TABLET ORAL at 05:32

## 2021-02-11 RX ADMIN — LEVOTHYROXINE SODIUM 112 MCG: 0.11 TABLET ORAL at 05:32

## 2021-02-11 RX ADMIN — FUROSEMIDE 20 MG: 20 TABLET ORAL at 10:31

## 2021-02-11 RX ADMIN — INSULIN HUMAN 2 UNITS: 100 INJECTION, SOLUTION PARENTERAL at 21:51

## 2021-02-11 RX ADMIN — NYSTATIN: 100000 POWDER TOPICAL at 18:00

## 2021-02-11 ASSESSMENT — FIBROSIS 4 INDEX: FIB4 SCORE: 1.32

## 2021-02-11 ASSESSMENT — ENCOUNTER SYMPTOMS
SORE THROAT: 0
PALPITATIONS: 0
SHORTNESS OF BREATH: 0
DIZZINESS: 0
NERVOUS/ANXIOUS: 0
ABDOMINAL PAIN: 0
HALLUCINATIONS: 1
HEADACHES: 1
TREMORS: 1
DIZZINESS: 1
NAUSEA: 0
DEPRESSION: 1
WEAKNESS: 1
BLURRED VISION: 1

## 2021-02-11 ASSESSMENT — COGNITIVE AND FUNCTIONAL STATUS - GENERAL
MOBILITY SCORE: 17
SUGGESTED CMS G CODE MODIFIER DAILY ACTIVITY: CJ
STANDING UP FROM CHAIR USING ARMS: A LITTLE
SUGGESTED CMS G CODE MODIFIER MOBILITY: CK
CLIMB 3 TO 5 STEPS WITH RAILING: A LOT
MOVING TO AND FROM BED TO CHAIR: A LITTLE
TURNING FROM BACK TO SIDE WHILE IN FLAT BAD: A LITTLE
TOILETING: A LITTLE
MOVING FROM LYING ON BACK TO SITTING ON SIDE OF FLAT BED: A LITTLE
DAILY ACTIVITIY SCORE: 21
DRESSING REGULAR LOWER BODY CLOTHING: A LITTLE
WALKING IN HOSPITAL ROOM: A LITTLE
HELP NEEDED FOR BATHING: A LITTLE

## 2021-02-11 ASSESSMENT — GAIT ASSESSMENTS
GAIT LEVEL OF ASSIST: MINIMAL ASSIST
DEVIATION: BRADYKINETIC
ASSISTIVE DEVICE: FRONT WHEEL WALKER
DISTANCE (FEET): 5

## 2021-02-11 ASSESSMENT — PAIN DESCRIPTION - PAIN TYPE
TYPE: ACUTE PAIN
TYPE: ACUTE PAIN

## 2021-02-11 ASSESSMENT — ACTIVITIES OF DAILY LIVING (ADL): TOILETING: INDEPENDENT

## 2021-02-11 NOTE — PROGRESS NOTES
"Patient expressed suicidal ideation with specific plan to complete suicide this morning. \"High Risk\" per [place]. Suicide precautions initiated per protocol. Suicide precaution safety measures initiated/verified. MD Colon notified. MD Colon to bedside to evaluate patient. Charge RN notified. 1:1 observation initiated. Patient placed on legal hold per MD Colon verbal order. Attempted to contact brother, Jean Castro, per patient request.   "

## 2021-02-11 NOTE — WOUND TEAM
[facility]  Inpatient Services  Initial Wound and Skin Care Evaluation    Admission Date: 2/9/2021     Last order of IP CONSULT TO WOUND CARE was found on 2/9/2021 from Hospital Encounter on 2/9/2021     HPI, PMH, SH: Reviewed    Past Surgical History:   Procedure Laterality Date   • COLECTOMY N/A 11/10/2020    Procedure: COLECTOMY-SEGMENTAL, COLOSTOMY, MOBILIZATION OF SPLENIC FLEXURE, WOUND VAC PLACEMENT, IRRIGATION AND DEBRIDMENT FLANK WOUND;  Surgeon: Kin Desouza D.O.;  Location: [facility];  Service: General   • ZZZ CARDIAC CATH  07/21/2018    EF 45%, normal coronaries.   • IRRIGATION & DEBRIDEMENT ORTHO Right 2/19/2018    Procedure: IRRIGATION & DEBRIDEMENT ORTHO-FOOT;  Surgeon: Kirby Lopez M.D.;  Location: [facility];  Service: Orthopedics   • TOE AMPUTATION Right 1/17/2018    Procedure: TOE AMPUTATION-1ST RAY;  Surgeon: Doug Delong M.D.;  Location: [facility];  Service: Orthopedics   • TOE AMPUTATION Right 11/10/2017    Procedure: TOE AMPUTATION;  Surgeon: Osorio Leo M.D.;  Location: [facility];  Service: Orthopedics     Social History     Tobacco Use   • Smoking status: Never Smoker   • Smokeless tobacco: Never Used   Substance Use Topics   • Alcohol use: No     Chief Complaint   Patient presents with   • Syncope     X1 this morning when standing up to go to bathroom     Diagnosis: Syncope and collapse [R55]    Unit where seen by Wound Team: T625/00     WOUND CONSULT/FOLLOW UP RELATED TO:  Abdomen, abdominal MASD, heel,  ostomy (see below)     WOUND HISTORY:  Patient known to wound team, previously had NPWT to midline abdomen. Colostomy creation 11/2020.     WOUND ASSESSMENT/LDA  Moisture Associated Skin Damage 02/09/21 Anterior (Active)   Wound Image   02/09/21 2151   NEXT Weekly Photo (Inpatient Only) 02/17/21 02/10/21 1710   Drainage Amount None 02/10/21 1710   Periwound Assessment Red;Denuded 02/10/21 1710   IAD Cleansing  Foam Cleanser/Washcloth 02/10/21 1710   Periwound Protectant Not Applicable 02/10/21 1710   IAD Containment Device None 02/10/21 1710   Number of days: 1       Wound 11/10/20 Full Thickness Wound Abdomen Midline resolving open surgical  (Active)   Wound Image      Site Assessment Red    Periwound Assessment Dry;Scar tissue    Margins Attached edges;Defined edges    Closure Secondary intention    Drainage Amount Scant    Drainage Description Serosanguineous    Treatments Cleansed;Site care;Tape change    Wound Cleansing Approved Wound Cleanser    Periwound Protectant Skin Protectant Wipes to Periwound    Dressing Cleansing/Solutions Not Applicable    Dressing Options Hydrofera Blue Ready;Hypafix Tape    Dressing Changed New    Dressing Status Clean;Dry;Intact    Dressing Change/Treatment Frequency Every 72 hrs, and As Needed    NEXT Dressing Change/Treatment Date 02/13/21    NEXT Weekly Photo (Inpatient Only) 02/17/21    Non-staged Wound Description Full thickness    Wound Length (cm) 12 cm    Wound Width (cm) 3.5 cm    Wound Surface Area (cm^2) 42 cm^2    Number of days: 92     Vascular:    SHAYAN:   No results found.    Lab Values:    Lab Results   Component Value Date/Time    WBC 11.4 (H) 02/09/2021 08:10 AM    RBC 5.36 02/09/2021 08:10 AM    HEMOGLOBIN 15.6 02/09/2021 08:10 AM    HEMATOCRIT 47.8 02/09/2021 08:10 AM    CREACTPROT 7.52 (H) 11/29/2020 01:25 AM    SEDRATEWES 13 10/23/2018 08:02 AM    HBA1C 7.8 (H) 11/10/2020 03:31 AM        Culture Results show:  No results found for this or any previous visit (from the past 720 hour(s)).    Pain Level/Medicated:  No premed. Tolerated well. Some pain with cleansing of abdomen.        INTERVENTIONS BY WOUND TEAM:  Chart and images reviewed. Discussed with bedside RN. This RN in to assess patient. Performed standard wound care which includes appropriate positioning, dressing removal and non-selective debridement. Pictures and measurements obtained weekly if/when  required.  Preparation for Dressing removal: Dressing soaked with n/a  Cleansed with:  NS and gauze.  Sharp debridement: n/a  Joya wound: Midline Abdomen: Cleansed with NS and gauze, Prepped with no sting skin prep    Primary Dressing: midline abdomen: hydrofera blue  LLQ MASD- ordered nystatin  Secondary (Outer) Dressing: midline abdomen: hypafix tape    Interdisciplinary consultation: Patient, Bedside RN (Eric), wound tech Therese    EVALUATION / RATIONALE FOR TREATMENT:  Most Recent Date:  2/11: Patient with healing full thickness wound to midline, applied Hydrofera Blue applied for the hydrophilic polyurethane foam which contains ethylene oxide used as a bactericidal, fungicidal, and sporicidal disinfectant. Hydrofera Blue also aids in maintaining a moist wound environment. The absorption properties of this dressing are important in collecting exudates and bacteria from the injured area. These harmful fluid secretions bind to the dressing removing it from the wound without the foam sticking to the wound causing more harm. Patient left heel with dried blood present, no pressure injury noted. Patient lower left abdomen with some MASD from his ostomy leaking onto his skin, ordered nystatin for nursing to apply.                Goals: Steady decrease in wound area and depth weekly.    WOUND TEAM PLAN OF CARE ([X] for frequency of wound follow up,):   Nursing to follow orders written for wound care. Contact wound team if area fails to progress, deteriorates or with any questions/concerns  Dressing changes by wound team:                   Follow up 3 times weekly:                NPWT change 3 times weekly:     Follow up 1-2 times weekly:      Follow up Bi-Monthly:                   Follow up as needed: X - will check abdomen when changing ostomy, nursing to manage   Other (explain):     NURSING PLAN OF CARE ORDERS (X):  Dressing changes: See Dressing Care orders: X  Skin care: See Skin Care orders: X  RN Prevention  Protocol:   Rectal tube care: See Rectal Tube Care orders:   Other orders:    RSKIN:   CURRENTLY IN PLACE (X), APPLIED THIS VISIT (A), ORDERED (O):   Q shift Michael:  X  Q shift pressure point assessments:  X    Surface/Positioning   Pressure redistribution mattress    X        Low Airloss          Bariatric foam      Bariatric DARYL     Waffle cushion        Waffle Overlay          Reposition q 2 hours      TAPs Turning system     Z Teja Pillow     Offloading/Redistribution   Sacral Mepilex (Silicone dressing)     Heel Mepilex (Silicone dressing)  X       Heel float boots (Prevalon boot)             Float Heels off Bed with Pillows           Respiratory   Silicone O2 tubing         Gray Foam Ear protectors     Cannula fixation Device (Tender )          High flow offloading Clip    Elastic head band offloading device      Anchorfast                                                         Trach with Optifoam split foam             Containment/Moisture Prevention     Rectal tube or BMS    Purwick/Condom Cath        Zabala Catheter    Barrier wipes           Barrier paste       Antifungal tx  X   Interdry        Mobilization VON      Up to chair        Ambulate      PT/OT      Nutrition VON      Dietician        Diabetes Education      PO     TF     TPN     NPO   # days     Other        Anticipated discharge plans:   LTACH:        SNF/Rehab:                  Home Health Care:           Outpatient Wound Center:            Self/Family Care:        Other:                    [facility]   Inpatient Services   Established Ostomy Management/ troubleshooting  HPI: Reviewed  PMH: Reviewed   SH: Reviewed   Reason for Ostomy nurse consult:  Established colostomy    Subjective: ostomy has been leaking per patient.     Colostomy 11/10/20 Transverse LUQ (Active)   Stomal Appliance Assessment Clean;Dry;Intact;Changed;Leaking 02/10/21 1710   Stoma Assessment Red 02/10/21 1710   Stoma Shape Budded Less Than One  "Inch;Oval 02/10/21 1710   Peristomal Assessment Denuded 02/10/21 1710   Mucocutaneous Junction Intact 02/10/21 1710   Treatment Appliance Changed;Bag Change;Cleansed with water/washcloth;Crusted with stoma powder 02/10/21 1710   Peristomal Protectant Stoma Powder;Paste Ring;No Sting Skin Prep 02/10/21 1710   Stomal Appliance 2 1/4\" (57mm) CTF;Paste Ring, 2\" 02/10/21 1710   Output (mL) 100 mL 02/10/21 0000   Output Color Brown 02/10/21 1710   WOUND RN ONLY - Stomal Appliance  2 1/4\" (57mm) CTF;Paste Ring, 2\";2 Piece 02/10/21 1710   Appliance Brand Daisy 02/10/21 1710          Ostomy Appliance (type and size): 2 1/4 2 piece with CO     Interventions and Education: removed current appliance. Cleansed stoma and peristomal area with moist wash cloth. Crusted peristomal skin x2 with stoma powder and no sting skin prep. Made template to fit. Applied paste ring to barrier, applied to patient. Attached bag to barrier. Closed end of pouch.     Evaluation: Patient established colostomy, home health nurse was completing care.     Plan: wound team to follow up due to leaking.     Anticipated discharge needs: patient would benefit from SNF or Home health for follow up care.     "

## 2021-02-11 NOTE — PROGRESS NOTES
Hospital Medicine Daily Progress Note    Date of Service  2/11/2021    Chief Complaint  Syncope    Hospital Course  55yo male with BMI:34.6, atrial fibrillation, depression, congestive heart failure, diabetes, diabetic neuropathy, hyperlipidemia, HTN, prior orthostatic hypotension with a question of adrenal insufficiency and had been on fludrocortisone.  He was admitted after syncope in his bathroom.  He was admitted and stopped of many of his medication per cardiology and continues to be closely monitored.    Interval Problem Update      Consultants/Specialty  Cardiology  Intensivist (signed off)     Code Status  Full Code    Disposition  Transfer to telemetry floor.    Review of Systems  Review of Systems   Constitutional: Positive for malaise/fatigue.   HENT: Negative for congestion.    Respiratory: Negative for shortness of breath.    Cardiovascular: Positive for leg swelling. Negative for chest pain.   Gastrointestinal: Negative for abdominal pain and nausea.   Genitourinary: Negative for dysuria.   Musculoskeletal: Negative for joint pain.   Neurological: Positive for dizziness and weakness.   Psychiatric/Behavioral: Positive for depression and suicidal ideas. The patient is not nervous/anxious.         Physical Exam  Temp:  [36 °C (96.8 °F)-36.9 °C (98.5 °F)] 36.2 °C (97.2 °F)  Pulse:  [] 87  Resp:  [10-28] 23  BP: ()/(57-94) 156/94  SpO2:  [90 %-98 %] 98 %    Physical Exam  Vitals reviewed.   Constitutional:       Appearance: He is obese. He is not diaphoretic.      Comments: weak   HENT:      Head: Normocephalic and atraumatic.      Nose: Nose normal.      Mouth/Throat:      Mouth: Mucous membranes are moist.      Pharynx: No oropharyngeal exudate.   Eyes:      General: No scleral icterus.        Right eye: No discharge.         Left eye: No discharge.      Extraocular Movements: Extraocular movements intact.      Conjunctiva/sclera: Conjunctivae normal.   Cardiovascular:      Rate and Rhythm:  Normal rate and regular rhythm.      Pulses:           Radial pulses are 2+ on the right side and 2+ on the left side.        Dorsalis pedis pulses are 2+ on the right side and 2+ on the left side.      Heart sounds: No murmur.   Pulmonary:      Effort: Pulmonary effort is normal. No respiratory distress.      Breath sounds: Normal breath sounds. No wheezing or rales.   Abdominal:      General: Bowel sounds are normal. There is no distension.      Palpations: Abdomen is soft.   Musculoskeletal:         General: No swelling or tenderness.      Cervical back: No muscular tenderness.      Right lower leg: Edema present.      Left lower leg: Edema present.   Lymphadenopathy:      Cervical: No cervical adenopathy.   Skin:     Coloration: Skin is not jaundiced or pale.      Comments: No obvious sign of hyperpigmentation   Neurological:      General: No focal deficit present.      Mental Status: He is alert and oriented to person, place, and time. Mental status is at baseline.      Cranial Nerves: No cranial nerve deficit.   Psychiatric:         Mood and Affect: Mood is depressed.         Behavior: Behavior normal.         Thought Content: Thought content includes suicidal ideation. Thought content includes suicidal plan.         Fluids    Intake/Output Summary (Last 24 hours) at 2/11/2021 1020  Last data filed at 2/11/2021 0700  Gross per 24 hour   Intake 470 ml   Output 700 ml   Net -230 ml       Laboratory  Recent Labs     02/09/21  0810   WBC 11.4*   RBC 5.36   HEMOGLOBIN 15.6   HEMATOCRIT 47.8   MCV 89.2   MCH 29.1   MCHC 32.6*   RDW 45.9   PLATELETCT 229   MPV 10.7     Recent Labs     02/09/21  0810   SODIUM 139   POTASSIUM 4.3   CHLORIDE 100   CO2 27   GLUCOSE 151*   BUN 15   CREATININE 1.17   CALCIUM 9.7     Recent Labs     02/09/21  0810   APTT 29.6   INR 1.08               Imaging  EC-ECHOCARDIOGRAM COMPLETE W/O CONT   Final Result      DX-LUMBAR SPINE-2 OR 3 VIEWS   Final Result      Moderate compression  deformity of T11 is new compared to 2018.      Mild wedge deformity of T12 is unchanged.      Degenerative changes including facet arthropathy.      Mild retrolisthesis of L5 on S1 and L3 on L4.              Assessment/Plan  * Syncope  Assessment & Plan  Patient with syncopal event this am after standing to use the restroom   Suspect likely orthostasis   2/11 stop flomax.  PT/OT   Serial troponin, mildly elevated above baseline   Echocardiogram 2/0 shows improved EF  continous cardiac monioring    AF (atrial fibrillation) (HCC)  Assessment & Plan  Cardiology consulting  Cardiac monitoring  2/11 HR: 60-80's  Holding home diltiazem, metoprolol and digoxin per cardiology recommendation.    Adrenal insufficiency (HCC)- (present on admission)  Assessment & Plan  Question as to when this was diagnosed.  Patient unaware.  Unsure if primary, secondary, or tertiary issue.  Cosyntropin stimulation test reviewed with only a increase in his cortisol going from 14.5 then up to 21.3 after cosyntropin given  Await Renin, ACTH   Low cortisol.  Holding solucortef.    Question if this is from lack of exercise and getting up at home causing decrease  Needs outpatient endocrinology follow up.  Question if part of this is due to inmobility and if prior steroids.    Suicidal ideation  Assessment & Plan  States depression over only being able to walk 10 feet  Poor coping mechanism on his physical capabilities  I have consulted Pscyhiatry  Legal hold initiated.  States he would take an overdose if available to him.  Last attempted SI 1.5 years ago he states.    Orthostatic hypotension- (present on admission)  Assessment & Plan  Cosyntropin stimulation test, renin, Acth, cortisol levels   Holding solucortef since 2/10  Stopped flomax 2/11 and discussed with patient as possible cause and alerted him to fact that he may have recurrent urinary retiention.  Debility vs adrenal insufficiency  PT/OT.  Rehab vs SNF    Anxiety and depression-  (present on admission)  Assessment & Plan  I have ordered for Psychology and Psychiatry to consult  Cognitive behavior treatment for coping mechanisms to help with his chronic disease and physical decline would be beneficial for him.  Cardiology has dc'd his risperdal for possible side effect of hypotension and will have Psychiatry eval for other antidepressant.  He is on modafinil outpatient.  Consideration of Paxil?    Hypothyroidism- (present on admission)  Assessment & Plan  Resume home levothyroxine    Chronic systolic heart failure (HCC)- (present on admission)  Assessment & Plan  Await new echocardiogram  meds held due to lower BP.  Elevated BNP  Hx of HFrEF 30-35%  2/10 Echo:   Compared to the images of the study done on 11/11/2020 - there has been   normalization of ejection fraction previously 35%.  Normal left ventricular systolic function.  Left ventricular ejection fraction is visually estimated to be 60%.  Diastolic function is difficult to assess with atrial fibrillation.  Normal right ventricular systolic function.  Mild  mitral regurgitation.       Essential hypertension- (present on admission)  Assessment & Plan  Resume home anti hypertensive medications    Type 2 diabetes mellitus with hyperglycemia, without long-term current use of insulin (HCC)- (present on admission)  Assessment & Plan  Lantus  A1c:7.8 in past 3 months  Monitor accuchecks and cover with SSI    Elevated troponin  Assessment & Plan  Mildly above baseline   consistant elevation over serial troponins likely CHF related.  Cont to trend     Mixed hyperlipidemia- (present on admission)  Assessment & Plan  Continue synthroid  TSH normal this month       VTE prophylaxis: enoxaparin

## 2021-02-11 NOTE — PROGRESS NOTES
"Patient expressed concerns regarding worsening outlook, depression, and progression towards suicidal ideation. Patient discouraged by activity limitations and frequent hospitalizations. Patient \"Low Risk\" per CSSRS. MD Colon notified, to bedside to evaluate patient. Consults and continued monitoring ordered per protocol.   "

## 2021-02-11 NOTE — THERAPY
"Physical Therapy   Initial Evaluation     Patient Name: Sebastián Castro  Age:  56 y.o., Sex:  male  Medical Record #: [id number]  Today's Date: 2/11/2021     Precautions: Fall Risk    Assessment   Mr. Castro is a 55 y/o male who presents to acute secondary to hypotension. Pt is well known to this therapist from prior hospitalizations. Pt reports ongoing struggle with hypotension and dizziness. Reports has had about 2-3 falls in the past month, all due to dizziness. Pt reported L LE \"not working\", however on movement assessment no difference when compared to R. Supine /89 mmHg, /115 mmHg, standing 112/79 mmHg, and seated 156/94 mmHg. Reported dizziness when standing, was agreeable to transfer to chair. Further gait deferred due to large drop in systolic pressure. Min assist only provided as pt reported he was symptomatic as a safety measure. No LOB, steady on feet. Anticipate once BP is managed pt will be capable of discharge home with home health management. Do recommend extra caution when mobilizing patient as in past has demonstrated high fall risk behaviors (attributing fall with increased hospital stay).   Plan    Recommend Physical Therapy 2 times per week until therapy goals are met for the following treatments:  Bed Mobility, Gait Training, Stair Training, Therapeutic Activities and Therapeutic Exercises    DC Equipment Recommendations: None  Discharge Recommendations: Recommend home health for continued physical therapy services       Subjective    \"I'm tired of this\"     Objective       02/11/21 0930   Prior Living Situation   Prior Services None   Housing / Facility Transitional Living   Steps Into Home 5   Rail Both Rail (Steps into Home)   Equipment Owned Front-Wheel Walker   Lives with - Patient's Self Care Capacity Alone and Able to Care For Self   Comments reports [facility] into [facility] days ago   Prior Level of Functional Mobility   Bed Mobility Independent   Transfer Status Independent "   Ambulation Independent   Distance Ambulation (Feet) 100   Assistive Devices Used Front-Wheel Walker   Stairs Independent   Cognition    Level of Consciousness Alert   Passive ROM Lower Body   Passive ROM Lower Body WDL   Comments within ability of body habitus   Active ROM Lower Body    Active ROM Lower Body  WDL   Comments within ability of body habitus   Strength Lower Body   Lower Body Strength  WDL   Comments within ability of body habitus   Sensation Lower Body   Comments neuropathy in B LE, baseline   Balance Assessment   Sitting Balance (Static) Good   Sitting Balance (Dynamic) Fair +   Standing Balance (Static) Fair +   Standing Balance (Dynamic) Fair   Weight Shift Sitting Good   Weight Shift Standing Fair   Comments FWW   Gait Analysis   Gait Level Of Assist Minimal Assist   Assistive Device Front Wheel Walker   Distance (Feet) 5   # of Times Distance was Traveled 1   Deviation Bradykinetic   Weight Bearing Status no restrictions   Bed Mobility    Supine to Sit Supervised   Sit to Supine   (up in chair)   Scooting Supervised   Functional Mobility   Sit to Stand Supervised   Bed, Chair, Wheelchair Transfer Minimal Assist   Short Term Goals    Short Term Goal # 1 Pt will perform functional transfers with SPV in 6 visits to increase independence   Short Term Goal # 2 Pt will ambulae 100ft with FWW and SPV in 6 visits to increase independence   Short Term Goal # 3 Pt will ascend/descend 4 steps with SPV in 6 visits to enter/exit home

## 2021-02-11 NOTE — THERAPY
"Occupational Therapy   Initial Evaluation     Patient Name: Sebastián Castro  Age:  56 y.o., Sex:  male  Medical Record #: [id number]  Today's Date: 2/11/2021     Precautions  Precautions: Fall Risk  Comments: + orthostatic     Assessment  Patient is 56 y.o. male admitted for syncope. PMHX: Afib, depression, CHF, DM, neuropathy, hyperlipidemia, HTN, and orthostatic BP. This admission pt is dx, syncope, AF, adrenal insufficiency, SI and on going orthostatic BP. Pt has a flat affect, applogizing for expressing his feelings around depression and hyper focused  on BP issues. Pt has adequate strength to participate in ADL's and is primarily limited by symptoms of orthostatic BP. Anticipate progress in this setting.     Plan    Recommend Occupational Therapy 2 times per week until therapy goals are met for the following treatments:  Self Care/Activities of Daily Living, Therapeutic Activities and Therapeutic Exercises.    DC Equipment Recommendations: Unable to determine at this time  Discharge Recommendations: Anticipate that the patient will have no further occupational therapy needs after discharge from the hospital     Subjective  \"See my blood pressure dropped'      Objective     02/11/21 0942   Prior Living Situation   Prior Services None   Housing / Facility Transitional Living   Steps Into Home 5   Rail Both Rail (Steps into Home)   Bathroom Set up Bathtub / Shower Combination   Equipment Owned Front-Wheel Walker   Lives with - Patient's Self Care Capacity Alone and Able to Care For Self   Comments reports [facility] into [facility] days ago   Prior Level of ADL Function   Self Feeding Independent   Grooming / Hygiene Independent   Bathing Independent   Dressing Independent   Toileting Independent   Prior Level of IADL Function   Medication Management Independent   Laundry Independent   Kitchen Mobility Independent   Finances Independent   Home Management Independent   Shopping Independent   Prior Level Of Mobility " Independent With Device in Community   History of Falls   History of Falls Yes   Precautions   Precautions Fall Risk   Comments + orthostatic    Pain 0 - 10 Group   Therapist Pain Assessment 0;Nurse Notified   Cognition    Cognition / Consciousness X   Level of Consciousness Alert   Comments flat affect perseverating on BP issues, applogietic    Passive ROM Upper Body   Passive ROM Upper Body WDL   Active ROM Upper Body   Active ROM Upper Body  WDL   Strength Upper Body   Upper Body Strength  WDL   Sensation Upper Body   Upper Extremity Sensation  WDL   Upper Body Muscle Tone   Upper Body Muscle Tone  WDL   Neurological Concerns   Neurological Concerns No   Coordination Upper Body   Coordination WDL   Balance Assessment   Sitting Balance (Static) Good   Sitting Balance (Dynamic) Fair +   Standing Balance (Static) Fair +   Standing Balance (Dynamic) Fair   Weight Shift Sitting Good   Weight Shift Standing Fair   Comments w/fww    Bed Mobility    Supine to Sit Supervised   Comments to chair    ADL Assessment   Grooming Supervision;Seated   Upper Body Dressing Supervision   Lower Body Dressing Minimal Assist   Toileting   (NT )   How much help from another person does the patient currently need...   6 Clicks Daily Activity Score 21   Functional Mobility   Sit to Stand Supervised   Bed, Chair, Wheelchair Transfer Minimal Assist   Mobility EOB sit>Stand to chair    Visual Perception   Visual Perception  Not Tested   Edema / Skin Assessment   Edema / Skin  Not Assessed   Activity Tolerance   Comments limited by BP issues    Patient / Family Goals   Patient / Family Goal #1 none stated    Short Term Goals   Short Term Goal # 1 pt will complete FB dressing w/spv    Short Term Goal # 2 pt will complete toilet txf w/spv    Short Term Goal # 3 pt will complete groomig standing at sink w/spv    Education Group   Role of Occupational Therapist Patient Response Patient;Acceptance;Explanation   Problem List   Problem List  Decreased Active Daily Living Skills;Decreased Functional Mobility;Decreased Activity Tolerance   Anticipated Discharge Equipment and Recommendations   DC Equipment Recommendations Unable to determine at this time   Discharge Recommendations Anticipate that the patient will have no further occupational therapy needs after discharge from the hospital   Interdisciplinary Plan of Care Collaboration   IDT Collaboration with  Nursing   Patient Position at End of Therapy In Bed;Call Light within Reach;Tray Table within Reach;Phone within Reach   Collaboration Comments RN aware of session    Session Information   Date / Session Number  2/11 #1 (1/2, 2/16)   Priority 2

## 2021-02-11 NOTE — CONSULTS
"PSYCHIATRIC new consult:  *Reason for admission: Syncope  Reason for consult: \" Risperdal 4 mg daily seems excessively high and is a new med for him, he is having excessive hypotension issues.  Can we reduce Risperdal dose or consider change?\"     Requesting provider: Evgeny Murphy M.D.   *Legal Hold Status: Started today by medical team for suicidal ideation with specific method              Chart reviewed.  Patient experiencing hypertension and A. fib with bradycardia.  Patient recently on Risperdal, which was increased to 2 4 mg recently by this provider.  He was also receiving digoxin, Lasix, Florinef, metoprolol, lisinopril.  Patient had been started on Metroprolol and lisinopril the day prior to admission.     CC \" if I cannot walk, life is not worth living\"       *HPI:   Patient reported increasing frustration with his inability to walk and dependence on others.  Also reported he is tired of coming back to the hospital due to frequent falls. \"  I am tired of falling, and being picked up.  I am ready to go.  If I cannot walk, life is not worth living.  I am just tired of coming back to the hospital every time a fall.  I am not getting anywhere.  I am embarrassed from falling.  I want to be me, not being dependent.  My mind is not in a good place, it is degrading to me.  I am a man; I just want to be able to walk again\".  Patient reports having suicidal thoughts again today, stating that he would possibly take his heart pills or sleeping pills to attempt suicide.  Although he is suicidal, he wants to continue all medical care possible, including physical therapy.  He feels however, that it is a long way off from getting better.  This morning he reports decreased concentration and having a hard time focusing on his puzzles.  He continues to feel fatigued and would like to be on a legal hold for a couple days.  He wants to participate in psychotherapy to address his thinking pattern.  Patient did not endorse " auditory hallucinations throughout evaluation.  When I brought it up, he stated that they came back.  I discussed extensively with patient the risks of being on antipsychotics at this time, and explained to the patient that in my opinion his voices are congruent with his mood, and that at this time it would be more beneficial to target his mood through psychotherapy.  He agreed with this plan.    Medical ROS (as pertinent):     Review of Systems   Constitutional: Positive for malaise/fatigue.   HENT: Positive for tinnitus. Negative for sore throat.    Eyes: Positive for blurred vision.   Respiratory: Negative for shortness of breath.    Cardiovascular: Negative for chest pain and palpitations.   Genitourinary: Positive for dysuria and frequency.   Neurological: Positive for dizziness, tremors, weakness and headaches.   Psychiatric/Behavioral: Positive for depression, hallucinations and suicidal ideas.           *Psychiatric Examination:  Vitals:   Vitals:    02/11/21 1210   BP: 125/87   Pulse: 79   Resp: (!) 25   Temp:    SpO2: 97%       General Appearance: Patient appears stated age, fair grooming and hygiene, sitting on a chair, calm and cooperative with fair eye contact  Abnormal Movements: Bilateral upper extremity tremors  Gait and Posture: Normal sitting on the chair  Speech: Normal volume, tone and rhythm  Thought processes: Linear and goal oriented  Associations: No loose associations  Abnormal or Psychotic Thoughts: Auditory hallucinations  Judgement and Insight: Fair/limited  Orientation: Grossly oriented  Recent and Remote Memory: Intact  Attention Span and Concentration: Intact  Language: Fluid  Fund of Knowledge: Not tested  Mood and Affect: Depressed, depressed  SI/HI: SI+ with method but no specific plan.  No intention at this time.    PPH, PMH, SHx: Reviewed.  Patient was recently treated by this physician on his prior admission.  No significant changes.      *EKG: , A. fib with heart rate 41  on 2/9/2021     *Labs personally reviewed: Troponin, digoxin, CBC, CMP, BPNP, cortisol,     Assessment: Patient with a history of MDD with psychotic features, history of SIS and neurological function, well-known to this provider, returned to the hospital with syncope after being discharged recently.  I followed up with patient during his previous admission and had increased Risperdal from 2.5 mg to 4 mg daily to target psychosis.  I also had restarted modafinil on 2/2/2021.  No hypotension noted during that admission.  Patient was re-admitted with hypotension and A. fib with bradycardia.  He was seen by cardiologist and interventional cardiologist.  Notes reviewed.  Patient was on multiple AV sanket blocking agents as well antihypertensives.  As per chart Metroprolol and lisinopril had been started recently. Risperdal can cause hypotension and has potential for cardiac side effects.  However, I doubt that the medication itself alone caused patient's symptoms.  After evaluation today, and considering cardiovascular side effects from antipsychotics, I will not be restarting any antipsychotics at this time, as all antipsychotics have potential for severe cardiac side effects.  Benefits do not outweigh risks at this time.  Patient however will greatly benefit from inpatient psychotherapy to target thinking patterns.  Also in my opinion, his auditory hallucinations are congruent with mood.  Will attempt to target his mood through psychotherapy at this time.    Dx:  Major depressive disorder, recurrent, with psychotic features congruent with mood  Adjustment disorder with depressed and anxious mood  Rule out depressed disorder due to medical condition      Medical:  Syncope  Atrial fibrillation  Adrenal insufficiency  Orthostatic hypotension  Hypothyroidism  Chronic systolic heart failure  Essential hypertension  Diabetes type 2  Elevated troponin  Mixed hyperlipidemia      Plan:  1- Legal hold: Extended  2- Psychotropic  medications: Continue modafinil 100 mg p.o. daily for mood  Will not start any antipsychotics at this time  3-patient agreed with inpatient psychotherapy to help patient to change thinking patterns and improve coping skills.    4- Psychiatry will follow up.     Thank you for the consult.       Sitter: Yes  Phone, Visitors, Personal belongings: Per nurse's discretion    This note was created using voice recognition software (Dragon). The accuracy of the dictation is limited by the abilities of the software. I have reviewed the note prior to signing. However, error related to voice recognition software and /or scribes may still exist and should be interpreted within the appropriate context.

## 2021-02-11 NOTE — PROGRESS NOTES
Cardiology Follow Up Progress Note    Date of Service  2/10/2021    Attending Physician  Wicho Colon D.O.    Reason for consult  Hypotension and bradycardia    HPI  Sebastián Castro is a 56 y.o. male admitted with syncope.    Interim Events  Patient seen by Dr. Fernandez yesterday.  He presented with dizziness and was on multiple medications including Florinef but also lisinopril.  He was on 3 AV sanket blocking agents.    This morning patient feels better.  Denies any recurrent dizziness.    Review of Systems  Review of Systems   Constitutional: Negative for malaise/fatigue.   Respiratory: Negative for shortness of breath.    Cardiovascular: Negative for chest pain.   Gastrointestinal: Negative for abdominal pain.   Neurological: Negative for dizziness.   All other systems reviewed and are negative.      Vital signs in last 24 hours  Temp:  [36 °C (96.8 °F)-36.8 °C (98.2 °F)] 36.6 °C (97.8 °F)  Pulse:  [] 92  Resp:  [10-22] 12  BP: ()/(25-87) 128/74  SpO2:  [94 %-100 %] 94 %    Physical Exam  Physical Exam   Constitutional: He is oriented to person, place, and time. No distress.   Cardiovascular: Normal rate and regular rhythm. Exam reveals no gallop and no friction rub.   No murmur heard.  Pulmonary/Chest: Effort normal and breath sounds normal. No respiratory distress. He has no wheezes. He has no rales.   Abdominal: Soft. He exhibits no distension. There is no abdominal tenderness.   Musculoskeletal:         General: No edema.   Neurological: He is alert and oriented to person, place, and time.   Skin: Skin is warm and dry. He is not diaphoretic.   Nursing note and vitals reviewed.      Lab Review  Recent Labs     02/09/21  0810   WBC 11.4*   RBC 5.36   HEMOGLOBIN 15.6   HEMATOCRIT 47.8   PLATELETCT 229     Recent Labs     02/09/21  0810   SODIUM 139   POTASSIUM 4.3   CHLORIDE 100   CO2 27   GLUCOSE 151*   BUN 15   CREATININE 1.17      Recent Labs     02/09/21  0810   ALTSGPT 40   ASTSGOT 33    ALKPHOSPHAT 108*   TBILIRUBIN 0.5   GLUCOSE 151*      Recent Labs     02/09/21  0810   APTT 29.6   INR 1.08      Lab Results   Component Value Date/Time    CHOLSTRLTOT 104 11/22/2018 12:07 AM    LDL 60 11/22/2018 12:07 AM    HDL 27 (A) 11/22/2018 12:07 AM    TRIGLYCERIDE 87 11/22/2018 12:07 AM    TROPONINT 56 (H) 02/09/2021 03:47 PM         Labs reviewed as noted above.  Normal hemoglobin and creatinine    Cardiac Imaging and Procedures Review  Echocardiogram performed today was personally reviewed and per my interpretation shows preserved LV systolic function.    Telemetry reviewed and showed A. fib, rate controlled.    Assessment/Plan    Syncope:  Orthostatic hypotension:   Bradycardia:  Concern for adrenal insufficiency:  Paroxysmal atrial fibrillation:  Nonischemic cardiomyopathy:    Patient on multiple medications that could cause bradycardia and hypotension.  Continue holding all AV sanket blocking agents for now.  He continues to be rate controlled with his atrial fibrillation.  Continue holding antihypertensives as well.  Patient with diagnosis of adrenal insufficiency and on steroids per primary team.  Cosyntropin stimulation test ordered for tomorrow.  Patient is a history of nonischemic cardiomyopathy.  His echocardiogram today shows normalization of his LV function.  He appears euvolemic on exam.    Will follow    Thank you for allowing me to participate in the care of this patient. Please do not hesitate to contact me with any questions.      Romana Russell MD, [facility]  Cardiologist  [facility]

## 2021-02-11 NOTE — DIETARY
"Nutrition services: Day 0 of admit.  Sebastián Castro is a 56 y.o. male with admitting DX of Syncope and collapse    RD alerted to pt with Malnutrition Screening Score of 2 for unintentional 14-23 lb weight loss in 1 month.     Assessment:  Height: 182.9 cm (6')  Weight: 116 kg (255 lb 8.2 oz)  Body mass index is 34.65 kg/m²., BMI classification: Class I obesity    Diet/Intake: Consistent CHO diet in place. PO intake % of dinner last night per ADLs.    Evaluation:   1. Reviewed pt's chart prior to visit.  2. Pt says he has a good appetite and has been eating \"most\" of what is being sent @ meals. Regarding colostomy, pt avoids vegetables, but otherwise he eats a varied diet. Pt typically eats 3 meals per day and would like an evening snack of yogurt. Pt stays well-hydrated per his report.  3. Pt says he pays close attention to his weight in an effort to manage DM. Chart review reveals the following weight changes:   1. 169.192 kg on 11/3/2020. Lengthy admission, during which colostomy was placed. Pt D/c'd on 12/17/2020, then weighing 127.8 kg.  2. Last admitted 2/1-2/5, but no weight recorded.  3. Therefore, most recent weight of 116 kg (2/9), reflects 9% weight loss in 7.5 weeks, which is severe.     Malnutrition Risk: Severe 9% weight loss in 7.5 weeks, but no poor PO intake reported. Pt appears well-nourished.     Recommendations/Plan:  1. Encourage intake of meals and snacks.  2. Document all PO intake in ADLs to provide interdisciplinary communication across all shifts.   3. Monitor weight.  4. Nutrition Rep will continue to see pt for ongoing preferences.     RD available as further indicated.           "

## 2021-02-11 NOTE — PROGRESS NOTES
Patient was noted to have orthostatic hypotension during mobilization, patient was very insistent to try to ambulate down the russell and said will tell this writer immediately if he feels dizzy. Patient was able to ambulate with a walker with close assistance , tolerated about 15 ft and back to bed immediately when he felt dizzy. Patient verbalized frustration and felt depressed that he was not able to move as he used to. This RN tried to reassured the patient and encourage to verbalized the patient feelings.

## 2021-02-12 LAB
ACTH PLAS-MCNC: 3.4 PG/ML (ref 7.2–63.3)
ANION GAP SERPL CALC-SCNC: 12 MMOL/L (ref 7–16)
BUN SERPL-MCNC: 29 MG/DL (ref 8–22)
CALCIUM SERPL-MCNC: 9.4 MG/DL (ref 8.5–10.5)
CHLORIDE SERPL-SCNC: 102 MMOL/L (ref 96–112)
CO2 SERPL-SCNC: 25 MMOL/L (ref 20–33)
CREAT SERPL-MCNC: 1.14 MG/DL (ref 0.5–1.4)
GLUCOSE BLD-MCNC: 103 MG/DL (ref 65–99)
GLUCOSE BLD-MCNC: 105 MG/DL (ref 65–99)
GLUCOSE BLD-MCNC: 118 MG/DL (ref 65–99)
GLUCOSE BLD-MCNC: 119 MG/DL (ref 65–99)
GLUCOSE BLD-MCNC: 120 MG/DL (ref 65–99)
GLUCOSE BLD-MCNC: 167 MG/DL (ref 65–99)
GLUCOSE SERPL-MCNC: 115 MG/DL (ref 65–99)
NT-PROBNP SERPL IA-MCNC: 3221 PG/ML (ref 0–125)
POTASSIUM SERPL-SCNC: 3.8 MMOL/L (ref 3.6–5.5)
SODIUM SERPL-SCNC: 139 MMOL/L (ref 135–145)

## 2021-02-12 PROCEDURE — 700102 HCHG RX REV CODE 250 W/ 637 OVERRIDE(OP): Performed by: HOSPITALIST

## 2021-02-12 PROCEDURE — 99233 SBSQ HOSP IP/OBS HIGH 50: CPT | Performed by: INTERNAL MEDICINE

## 2021-02-12 PROCEDURE — 83880 ASSAY OF NATRIURETIC PEPTIDE: CPT

## 2021-02-12 PROCEDURE — 770020 HCHG ROOM/CARE - TELE (206)

## 2021-02-12 PROCEDURE — A9270 NON-COVERED ITEM OR SERVICE: HCPCS | Performed by: HOSPITALIST

## 2021-02-12 PROCEDURE — 99232 SBSQ HOSP IP/OBS MODERATE 35: CPT | Performed by: HOSPITALIST

## 2021-02-12 PROCEDURE — 700111 HCHG RX REV CODE 636 W/ 250 OVERRIDE (IP): Performed by: INTERNAL MEDICINE

## 2021-02-12 PROCEDURE — A9270 NON-COVERED ITEM OR SERVICE: HCPCS | Performed by: INTERNAL MEDICINE

## 2021-02-12 PROCEDURE — 80048 BASIC METABOLIC PNL TOTAL CA: CPT

## 2021-02-12 PROCEDURE — 302098 PASTE RING (FLAT): Performed by: HOSPITALIST

## 2021-02-12 PROCEDURE — 700102 HCHG RX REV CODE 250 W/ 637 OVERRIDE(OP): Performed by: INTERNAL MEDICINE

## 2021-02-12 PROCEDURE — 82962 GLUCOSE BLOOD TEST: CPT

## 2021-02-12 RX ORDER — DIGOXIN 0.25 MG/ML
250 INJECTION INTRAMUSCULAR; INTRAVENOUS ONCE
Status: COMPLETED | OUTPATIENT
Start: 2021-02-12 | End: 2021-02-12

## 2021-02-12 RX ORDER — DIGOXIN 0.25 MG/ML
500 INJECTION INTRAMUSCULAR; INTRAVENOUS ONCE
Status: COMPLETED | OUTPATIENT
Start: 2021-02-12 | End: 2021-02-12

## 2021-02-12 RX ORDER — MIDODRINE HYDROCHLORIDE 5 MG/1
2.5 TABLET ORAL
Status: DISCONTINUED | OUTPATIENT
Start: 2021-02-12 | End: 2021-02-13

## 2021-02-12 RX ORDER — ZOLPIDEM TARTRATE 5 MG/1
2.5 TABLET ORAL NIGHTLY PRN
Status: DISCONTINUED | OUTPATIENT
Start: 2021-02-12 | End: 2021-03-13

## 2021-02-12 RX ORDER — DIGOXIN 250 MCG
125 TABLET ORAL DAILY
Status: DISCONTINUED | OUTPATIENT
Start: 2021-02-13 | End: 2021-06-07

## 2021-02-12 RX ADMIN — DIGOXIN 250 MCG: 0.25 INJECTION INTRAMUSCULAR; INTRAVENOUS at 18:05

## 2021-02-12 RX ADMIN — ATORVASTATIN CALCIUM 40 MG: 40 TABLET, FILM COATED ORAL at 18:05

## 2021-02-12 RX ADMIN — DOCUSATE SODIUM 50 MG AND SENNOSIDES 8.6 MG 2 TABLET: 8.6; 5 TABLET, FILM COATED ORAL at 05:16

## 2021-02-12 RX ADMIN — MODAFINIL 100 MG: 100 TABLET ORAL at 05:16

## 2021-02-12 RX ADMIN — FUROSEMIDE 20 MG: 20 TABLET ORAL at 05:17

## 2021-02-12 RX ADMIN — ENOXAPARIN SODIUM 120 MG: 120 INJECTION SUBCUTANEOUS at 05:16

## 2021-02-12 RX ADMIN — LEVOTHYROXINE SODIUM 112 MCG: 0.11 TABLET ORAL at 05:16

## 2021-02-12 RX ADMIN — DIGOXIN 500 MCG: 0.25 INJECTION INTRAMUSCULAR; INTRAVENOUS at 14:08

## 2021-02-12 RX ADMIN — INSULIN GLARGINE 15 UNITS: 100 INJECTION, SOLUTION SUBCUTANEOUS at 18:06

## 2021-02-12 RX ADMIN — ZOLPIDEM TARTRATE 2.5 MG: 5 TABLET ORAL at 20:53

## 2021-02-12 RX ADMIN — NYSTATIN: 100000 POWDER TOPICAL at 05:17

## 2021-02-12 RX ADMIN — FLUDROCORTISONE ACETATE 0.1 MG: 0.1 TABLET ORAL at 05:16

## 2021-02-12 RX ADMIN — MIDODRINE HYDROCHLORIDE 2.5 MG: 5 TABLET ORAL at 20:53

## 2021-02-12 RX ADMIN — ENOXAPARIN SODIUM 120 MG: 120 INJECTION SUBCUTANEOUS at 18:04

## 2021-02-12 ASSESSMENT — PAIN DESCRIPTION - PAIN TYPE
TYPE: ACUTE PAIN

## 2021-02-12 ASSESSMENT — ENCOUNTER SYMPTOMS
ABDOMINAL PAIN: 0
NERVOUS/ANXIOUS: 0
SHORTNESS OF BREATH: 0
DEPRESSION: 1
DIARRHEA: 0
NAUSEA: 0
DIZZINESS: 1

## 2021-02-12 ASSESSMENT — FIBROSIS 4 INDEX: FIB4 SCORE: 1.32

## 2021-02-12 NOTE — PROGRESS NOTES
Received patient from CICU, resting comfortably in bed. Sitter at bedside. Patient placed on the telemonitor. No complaints at this time. Vitals assessed. Will continue to monitor.     Patient with elevated BP, notified MD. Ordered to do orthostatics, MD updated.    Report given to Amanda PÉREZ.

## 2021-02-12 NOTE — CARE PLAN
Problem: Safety  Goal: Will remain free from injury  Outcome: PROGRESSING AS EXPECTED  Goal: Will remain free from falls  Outcome: PROGRESSING AS EXPECTED     Problem: Infection  Goal: Will remain free from infection  Outcome: PROGRESSING AS EXPECTED     Problem: Psychosocial Needs:  Goal: Level of anxiety will decrease  Outcome: PROGRESSING AS EXPECTED     Problem: Mobility  Goal: Risk for activity intolerance will decrease  Outcome: PROGRESSING AS EXPECTED

## 2021-02-12 NOTE — PROGRESS NOTES
Suicide Safety precautions ensured. 1: 1 sitter in the patient's room,instructed to watch the patient closely.

## 2021-02-12 NOTE — PROGRESS NOTES
"Patient verbalized concern on his colostomy \" I tell you a secret, I felt embarrassed with the smell, I know it's stinky\". Reassured patient, offered spray if its ok with him to do so, patient agreed. Informed patient that concern will be brought out this morning maybe they could apply something on the appliance to decrease the strong smell, patient agreed.  "

## 2021-02-12 NOTE — PROGRESS NOTES
Cardiology Follow Up Progress Note    Date of Service  2/12/2021    Attending Physician  Wicho Colon D.O.    Past medical history significant for tachycardia mediated cardiomyopathy, nonischemic cardiomyopathy with normal coronary angiography (2018), LVEF improved,  paroxysmal atrial fibrillation, type 2 diabetes, hyperlipidemia, hypertension, previously on oral anticoagulation with Xarelto, medical noncompliance, BMI 38.57, history of orthostatic hypotension/syncope despite midodrine, Florinef, pyridostigmine, oral anticoagulation discontinued 2019 secondary to frequent falls/syncope (extensive evaluation with cardiology and neurology in the past), previous suicidal ideations previous consultation with psychiatrist    Presented with recurrent syncope, recurrent suicidal ideation      Interim Events  No overnight cardiac events  Telemetry : A. fib with good rate control  Complaints of dizziness with exertion  Some high blood pressure supine  On midodrine  Labs reviewed  NA, K, CR in good order  Orthostatic hypotension, SBP supine 177, SBP standing 103  Sitter at bedside  Discharge planning to evaluate for the safety of patient's discharge home    Review of Systems  Review of Systems   Respiratory: Negative for apnea, cough, choking, chest tightness, shortness of breath, wheezing and stridor.    Cardiovascular: Negative for chest pain and leg swelling.   Neurological: Positive for dizziness.       Vital signs in last 24 hours  Temp:  [36.2 °C (97.2 °F)-36.6 °C (97.8 °F)] 36.2 °C (97.2 °F)  Pulse:  [] 98  Resp:  [9-27] 18  BP: ()/() 147/93  SpO2:  [90 %-100 %] 98 %    Physical Exam  Physical Exam  Constitutional:       Comments: Seizure at bedside   Cardiovascular:      Rate and Rhythm: Normal rate. Rhythm irregular.      Pulses: Normal pulses.   Pulmonary:      Effort: Pulmonary effort is normal.   Skin:     General: Skin is warm.   Neurological:      Mental Status: He is alert.   Psychiatric:       Comments: Appears to be at baseline         Lab Review  Lab Results   Component Value Date/Time    WBC 11.4 (H) 02/09/2021 08:10 AM    RBC 5.36 02/09/2021 08:10 AM    HEMOGLOBIN 15.6 02/09/2021 08:10 AM    HEMATOCRIT 47.8 02/09/2021 08:10 AM    MCV 89.2 02/09/2021 08:10 AM    MCH 29.1 02/09/2021 08:10 AM    MCHC 32.6 (L) 02/09/2021 08:10 AM    MPV 10.7 02/09/2021 08:10 AM      Lab Results   Component Value Date/Time    SODIUM 139 02/12/2021 04:30 AM    POTASSIUM 3.8 02/12/2021 04:30 AM    CHLORIDE 102 02/12/2021 04:30 AM    CO2 25 02/12/2021 04:30 AM    GLUCOSE 115 (H) 02/12/2021 04:30 AM    BUN 29 (H) 02/12/2021 04:30 AM    CREATININE 1.14 02/12/2021 04:30 AM      Lab Results   Component Value Date/Time    ASTSGOT 33 02/09/2021 08:10 AM    ALTSGPT 40 02/09/2021 08:10 AM     Lab Results   Component Value Date/Time    CHOLSTRLTOT 104 11/22/2018 12:07 AM    LDL 60 11/22/2018 12:07 AM    HDL 27 (A) 11/22/2018 12:07 AM    TRIGLYCERIDE 87 11/22/2018 12:07 AM    TROPONINT 56 (H) 02/09/2021 03:47 PM       Recent Labs     02/09/21  2140 02/12/21  0430   NTPROBNP 4153* 3221*       Cardiac Imaging and Procedures Review  EKG: Atrial fibrillation    Echocardiogram:     2/10/202 1 LVEF 60%    Diastolic function difficult to assess with A. Fib  Normal LV systolic function  Cardiac Catheterization:    Normal coronaries, 2018    Imaging  Chest X-Ray:      Stress Test: Not applicable    Assessment/Plan    Recurrent syncope  Orthostatic hypotension  -On low-dose midodrine  -Systolic blood pressure 100 standing  this morning  --Blood pressure is high in supine position, check blood pressure in semifowler position.    Concern for adrenal insufficiency  -We will defer to primary    Paroxysmal A. Fib  -Rate well controlled in A. Fib  -Digoxin  -Previously on oral anticoagulation with Xarelto but discontinued  2019 secondary to frequent falls/syncope.  -Currently on therapeutic Lovenox  -May discontinue Lovenox prior to  discharge.    Nonischemic cardiomyopathy  Normal coronaries, 2018  Tachycardia mediated  -LVEF improved, based on echo 2/10/2021 LVEF 60%    No further cardiac work-up  Discharge planning to evaluate safety of discharge home.  Cardiology will sign off      Thank you for allowing me to participate in the care of this patient.      Please contact me with any questions.    VALERIA Le.   Cardiologist, [facility]  [phone]

## 2021-02-12 NOTE — CONSULTS
PSYCHIATRY CONSULT TEAM NOTE: Consult received for psychotherapy. Patient is known to this writer from his previous hospitalization. Due to increased demand for psychotherapy, patient will be placed on a waitlist and seen when able.     Thank you.

## 2021-02-12 NOTE — PROGRESS NOTES
Hospital Medicine Daily Progress Note    Date of Service  2/12/2021    Chief Complaint  Syncope    Hospital Course  57yo male with BMI:34.6, atrial fibrillation, depression, congestive heart failure, diabetes, diabetic neuropathy, hyperlipidemia, HTN, prior orthostatic hypotension with a question of adrenal insufficiency and had been on fludrocortisone.  He was admitted after syncope in his bathroom.  He was admitted and stopped of many of his medication per cardiology and continues to be closely monitored.    Interval Problem Update  Alert.  States he was not happy with the psychiatrist that he saw today.  He walked 10ft today and was dizzy.  Rn states BP improved.  Still depressed.  Upset that his colostomy leaked today.    Consultants/Specialty  Cardiology  Intensivist (signed off)     Code Status  Full Code    Disposition  Transfer to telemetry floor.    Review of Systems  Review of Systems   Constitutional: Positive for malaise/fatigue.   HENT: Negative for congestion.    Respiratory: Negative for shortness of breath.    Cardiovascular: Negative for chest pain.   Gastrointestinal: Negative for abdominal pain, diarrhea and nausea.   Genitourinary: Negative for dysuria.   Musculoskeletal: Negative for joint pain.   Neurological: Positive for dizziness.   Psychiatric/Behavioral: Positive for depression. The patient is not nervous/anxious.         Physical Exam  Temp:  [36.4 °C (97.5 °F)-36.6 °C (97.8 °F)] 36.5 °C (97.7 °F)  Pulse:  [] 89  Resp:  [9-27] 18  BP: ()/() 147/86  SpO2:  [90 %-100 %] 97 %    Physical Exam  Vitals reviewed.   Constitutional:       Appearance: He is obese. He is not diaphoretic.      Comments: weak   HENT:      Head: Normocephalic and atraumatic.      Nose: Nose normal.      Mouth/Throat:      Mouth: Mucous membranes are moist.      Pharynx: No oropharyngeal exudate.   Eyes:      General: No scleral icterus.        Right eye: No discharge.         Left eye: No discharge.       Extraocular Movements: Extraocular movements intact.      Conjunctiva/sclera: Conjunctivae normal.   Cardiovascular:      Rate and Rhythm: Normal rate and regular rhythm.      Pulses:           Radial pulses are 2+ on the right side and 2+ on the left side.        Dorsalis pedis pulses are 2+ on the right side and 2+ on the left side.      Heart sounds: No murmur.   Pulmonary:      Effort: Pulmonary effort is normal. No respiratory distress.      Breath sounds: Normal breath sounds. No wheezing or rales.   Abdominal:      General: Bowel sounds are normal. There is no distension.      Palpations: Abdomen is soft.      Comments: LLQ colostomy.   Musculoskeletal:         General: No swelling or tenderness.      Cervical back: No muscular tenderness.      Right lower leg: Edema present.      Left lower leg: Edema present.   Lymphadenopathy:      Cervical: No cervical adenopathy.   Skin:     Coloration: Skin is not jaundiced or pale.      Comments: Veinous stasis chronic skin discoloration   Neurological:      General: No focal deficit present.      Mental Status: He is alert and oriented to person, place, and time. Mental status is at baseline.      Cranial Nerves: No cranial nerve deficit.   Psychiatric:         Mood and Affect: Mood is depressed.         Behavior: Behavior normal.         Thought Content: Thought content includes suicidal ideation. Thought content includes suicidal plan.         Fluids    Intake/Output Summary (Last 24 hours) at 2/12/2021 1340  Last data filed at 2/12/2021 1300  Gross per 24 hour   Intake 790 ml   Output 2215 ml   Net -1425 ml       Laboratory      Recent Labs     02/12/21  0430   SODIUM 139   POTASSIUM 3.8   CHLORIDE 102   CO2 25   GLUCOSE 115*   BUN 29*   CREATININE 1.14   CALCIUM 9.4                   Imaging  EC-ECHOCARDIOGRAM COMPLETE W/O CONT   Final Result      DX-LUMBAR SPINE-2 OR 3 VIEWS   Final Result      Moderate compression deformity of T11 is new compared to 2018.       Mild wedge deformity of T12 is unchanged.      Degenerative changes including facet arthropathy.      Mild retrolisthesis of L5 on S1 and L3 on L4.              Assessment/Plan  * Syncope  Assessment & Plan  Patient with syncopal event this am after standing to use the restroom   Suspect likely orthostasis   2/11 stop flomax.  PT/OT   Serial troponin, mildly elevated above baseline   Echocardiogram 2/0 shows improved EF  continous cardiac monioring    AF (atrial fibrillation) (HCC)  Assessment & Plan  Cardiology consulting  Cardiac monitoring  2/11 HR: 60-80's  Holding home diltiazem, metoprolol  Digoxin restarted per Dr Russell.    Adrenal insufficiency (HCC)- (present on admission)  Assessment & Plan  Question as to when this was diagnosed.  Patient unaware.  Unsure if primary, secondary, or tertiary issue.  Cosyntropin stimulation test reviewed with only a increase in his cortisol going from 14.5 then up to 21.3 after cosyntropin given  Await Renin, ACTH   Low cortisol.  Holding solucortef.    Question if this is from lack of exercise and getting up at home causing decrease  Needs outpatient endocrinology follow up.  Question if part of this is due to inmobility and if prior steroids.  2/12 Florinef restarted.    Suicidal ideation  Assessment & Plan  States depression over only being able to walk 10 feet  Poor coping mechanism on his physical capabilities  I have consulted Pscyhiatry  Legal hold initiated.  States he would take an overdose if available to him.  Last attempted SI 1.5 years ago he states.    Orthostatic hypotension- (present on admission)  Assessment & Plan  Cosyntropin stimulation test, renin, ACTH, cortisol levels   Holding solucortef since 2/10  Check daily orthostatic BP  2/12 restarted on Florinef 0.1 daily  Stopped flomax 2/11 and discussed with patient as possible cause and alerted him to fact that he may have recurrent urinary retiention.  Debility vs adrenal insufficiency  PT/OT.   Rehab vs SNF    Anxiety and depression- (present on admission)  Assessment & Plan  I have ordered for Psychology and Psychiatry to consult  Cognitive behavior treatment for coping mechanisms to help with his chronic disease and physical decline would be beneficial for him.  Cardiology has dc'd his risperdal for possible side effect of hypotension and will have Psychiatry eval for other antidepressant.  He is on modafinil outpatient.  Consideration of Paxil?    Hypothyroidism- (present on admission)  Assessment & Plan  Resume home levothyroxine    Chronic systolic heart failure (HCC)- (present on admission)  Assessment & Plan  Await new echocardiogram  meds held due to lower BP.  Elevated BNP  Hx of HFrEF 30-35%  2/10 Echo:   Compared to the images of the study done on 11/11/2020 - there has been   normalization of ejection fraction previously 35%.  Normal left ventricular systolic function.  Left ventricular ejection fraction is visually estimated to be 60%.  Diastolic function is difficult to assess with atrial fibrillation.  Normal right ventricular systolic function.  Mild  mitral regurgitation.       Essential hypertension- (present on admission)  Assessment & Plan  Monitor vitals.      Type 2 diabetes mellitus with hyperglycemia, without long-term current use of insulin ([facility])- (present on admission)  Assessment & Plan  Lantus  A1c:7.8 in past 3 months  Monitor accuchecks and cover with SSI    Elevated troponin  Assessment & Plan  Mildly above baseline   consistant elevation over serial troponins likely CHF related.  Cont to trend     Mixed hyperlipidemia- (present on admission)  Assessment & Plan  Continue synthroid  TSH normal this month       VTE prophylaxis: enoxaparin

## 2021-02-12 NOTE — PROGRESS NOTES
Cardiology Follow Up Progress Note    Date of Service  2/12/2021    Attending Physician  Wicho Colon D.O.    Reason for consult  Hypotension and bradycardia    HPI  Sebastián Castro is a 56 y.o. male admitted with syncope.  Was on multiple medications which were discontinued on presentation.  Patient has a history of nonischemic cardiomyopathy.  He underwent a coronary angiogram in 2018 that did not show any obstructive coronary artery disease.    Interim Events  No new cardiac symptoms.  Continues to report frustration about feeling dizzy with standing up.    Review of Systems  Review of Systems   Constitutional: Negative for malaise/fatigue.   Respiratory: Negative for shortness of breath.    Cardiovascular: Negative for chest pain.   Gastrointestinal: Negative for abdominal pain.   Neurological: Positive for dizziness.   All other systems reviewed and are negative.      Vital signs in last 24 hours  Temp:  [36.4 °C (97.5 °F)-36.6 °C (97.8 °F)] 36.5 °C (97.7 °F)  Pulse:  [] 89  Resp:  [9-27] 18  BP: ()/() 147/86  SpO2:  [90 %-100 %] 97 %    Physical Exam  Physical Exam   Constitutional: He is oriented to person, place, and time. No distress.   Cardiovascular: Normal rate and regular rhythm. Exam reveals no gallop and no friction rub.   No murmur heard.  Pulmonary/Chest: Effort normal and breath sounds normal. No respiratory distress. He has no wheezes. He has no rales.   Abdominal: Soft. He exhibits no distension. There is no abdominal tenderness.   Musculoskeletal:         General: No edema.   Neurological: He is alert and oriented to person, place, and time.   Skin: Skin is warm and dry. He is not diaphoretic.   Nursing note and vitals reviewed.      Lab Review  No results for input(s): WBC, RBC, HEMOGLOBIN, HEMATOCRIT, PLATELETCT in the last 72 hours.  Recent Labs     02/12/21  0430   SODIUM 139   POTASSIUM 3.8   CHLORIDE 102   CO2 25   GLUCOSE 115*   BUN 29*   CREATININE 1.14      Recent  Labs     02/12/21  0430   GLUCOSE 115*           Lab Results   Component Value Date/Time    CHOLSTRLTOT 104 11/22/2018 12:07 AM    LDL 60 11/22/2018 12:07 AM    HDL 27 (A) 11/22/2018 12:07 AM    TRIGLYCERIDE 87 11/22/2018 12:07 AM    TROPONINT 56 (H) 02/09/2021 03:47 PM         Labs reviewed as noted above.  Normal creatinine.    Cardiac Imaging and Procedures Review  Telemetry reviewed and showed atrial fibrillation, mild RVR since around 4 AM.    Assessment/Plan    Syncope:  Orthostatic hypotension:   Bradycardia:  Concern for adrenal insufficiency:  Paroxysmal atrial fibrillation:  Nonischemic cardiomyopathy:      Patient syncopal episode most likely secondary to orthostatic hypotension.  Continues to be orthostatic and symptomatic.  He has been started on low-dose Florinef but he has elevated blood pressures while sitting.  He has a history of cardiomyopathy and therefore would like to avoid salt tablets even though his LV systolic function has improved.  Also would like to avoid uncontrolled hypertension if possible.  We will therefore discontinue Florinef and instead try midodrine 2.5 mg twice daily.  Case discussed with Dr. Colon who does not think that the patient has adrenal insufficiency.      Patient was in mild RVR this morning.  Start on partial digoxin load today.  Daily dosing to start from tomorrow.    Continue therapeutic Lovenox for his atrial fibrillation.    Will follow    Thank you for allowing me to participate in the care of this patient. Please do not hesitate to contact me with any questions.      Romana Russell MD, [facility]  Cardiologist  [facility]

## 2021-02-12 NOTE — PROGRESS NOTES
Cardiology Follow Up Progress Note    Date of Service  2/11/2021    Attending Physician  Wicho Colon D.O.    Reason for consult  Hypotension and bradycardia    HPI  Sebastián Castro is a 56 y.o. male admitted with syncope.  Was on multiple medications which were discontinued on presentation.    Interim Events  Patient denies any cardiac symptoms.  Had suicidal ideations this morning and has a sitter.    Review of Systems  Review of Systems   Constitutional: Negative for malaise/fatigue.   Respiratory: Negative for shortness of breath.    Cardiovascular: Negative for chest pain.   Gastrointestinal: Negative for abdominal pain.   Neurological: Negative for dizziness.   All other systems reviewed and are negative.      Vital signs in last 24 hours  Temp:  [36 °C (96.8 °F)-36.9 °C (98.5 °F)] 36.2 °C (97.2 °F)  Pulse:  [] 100  Resp:  [10-28] 18  BP: ()/(57-94) 133/75  SpO2:  [90 %-98 %] 97 %    Physical Exam  Physical Exam   Constitutional: He is oriented to person, place, and time. No distress.   Cardiovascular: Normal rate and regular rhythm. Exam reveals no gallop and no friction rub.   No murmur heard.  Pulmonary/Chest: Effort normal and breath sounds normal. No respiratory distress. He has no wheezes. He has no rales.   Abdominal: Soft. He exhibits no distension. There is no abdominal tenderness.   Musculoskeletal:         General: No edema.   Neurological: He is alert and oriented to person, place, and time.   Skin: Skin is warm and dry. He is not diaphoretic.   Nursing note and vitals reviewed.      Lab Review  Recent Labs     02/09/21  0810   WBC 11.4*   RBC 5.36   HEMOGLOBIN 15.6   HEMATOCRIT 47.8   PLATELETCT 229     Recent Labs     02/09/21  0810   SODIUM 139   POTASSIUM 4.3   CHLORIDE 100   CO2 27   GLUCOSE 151*   BUN 15   CREATININE 1.17      Recent Labs     02/09/21  0810   ALTSGPT 40   ASTSGOT 33   ALKPHOSPHAT 108*   TBILIRUBIN 0.5   GLUCOSE 151*      Recent Labs     02/09/21  0810   APTT 29.6    INR 1.08      Lab Results   Component Value Date/Time    CHOLSTRLTOT 104 11/22/2018 12:07 AM    LDL 60 11/22/2018 12:07 AM    HDL 27 (A) 11/22/2018 12:07 AM    TRIGLYCERIDE 87 11/22/2018 12:07 AM    TROPONINT 56 (H) 02/09/2021 03:47 PM         Labs reviewed as noted above.      Cardiac Imaging and Procedures Review  Telemetry reviewed and showed rate controlled atrial fibrillation.    Assessment/Plan    Syncope:  Orthostatic hypotension:   Bradycardia:  Concern for adrenal insufficiency:  Paroxysmal atrial fibrillation:  Nonischemic cardiomyopathy:      Patient syncopal episode most likely secondary to orthostatic hypotension.  Continues to be orthostatic.  We will start low-dose Florinef.  Patient is being evaluated for adrenal insufficiency.  Defer to primary team.  His steroids are being held at this time.    His LV systolic function has normalized.  He continues to be euvolemic on exam.  For his atrial fibrillation, he continues to be rate controlled.  Continue to hold off on all AV sanket blocking agents.  Unclear why the patient is not on long-term anticoagulation.  Will start therapeutic Lovenox for now and reassess long-term management closer to discharge.      Will follow    Thank you for allowing me to participate in the care of this patient. Please do not hesitate to contact me with any questions.      Romana Russell MD, [facility]  Cardiologist  [facility]

## 2021-02-13 PROBLEM — R94.31 PROLONGED QT INTERVAL: Status: RESOLVED | Noted: 2020-11-19 | Resolved: 2021-02-13

## 2021-02-13 PROBLEM — E11.65 TYPE 2 DIABETES MELLITUS WITH HYPERGLYCEMIA, WITHOUT LONG-TERM CURRENT USE OF INSULIN (HCC): Status: RESOLVED | Noted: 2017-11-22 | Resolved: 2021-02-13

## 2021-02-13 PROBLEM — D68.318 ACQUIRED CIRCULATING ANTICOAGULANTS (HCC): Status: RESOLVED | Noted: 2018-07-09 | Resolved: 2021-02-13

## 2021-02-13 PROBLEM — R07.9 CHEST PAIN: Status: RESOLVED | Noted: 2018-07-20 | Resolved: 2021-02-13

## 2021-02-13 PROBLEM — R73.03 PRE-DIABETES: Status: RESOLVED | Noted: 2019-10-19 | Resolved: 2021-02-13

## 2021-02-13 PROBLEM — E66.812 CLASS 2 OBESITY DUE TO EXCESS CALORIES WITHOUT SERIOUS COMORBIDITY WITH BODY MASS INDEX (BMI) OF 37.0 TO 37.9 IN ADULT: Status: RESOLVED | Noted: 2018-10-22 | Resolved: 2021-02-13

## 2021-02-13 PROBLEM — R39.198 DIFFICULTY VOIDING: Status: RESOLVED | Noted: 2018-09-10 | Resolved: 2021-02-13

## 2021-02-13 PROBLEM — E11.8 DIABETIC FOOT (HCC): Status: RESOLVED | Noted: 2018-02-17 | Resolved: 2021-02-13

## 2021-02-13 PROBLEM — B37.2 CANDIDAL INTERTRIGO: Status: RESOLVED | Noted: 2017-11-11 | Resolved: 2021-02-13

## 2021-02-13 PROBLEM — G82.20 PARAPLEGIA (HCC): Status: RESOLVED | Noted: 2018-07-22 | Resolved: 2021-02-13

## 2021-02-13 PROBLEM — E11.9 DIABETES MELLITUS TYPE 2, INSULIN DEPENDENT (HCC): Status: ACTIVE | Noted: 2021-02-13

## 2021-02-13 PROBLEM — F32.A ANXIETY AND DEPRESSION: Status: RESOLVED | Noted: 2020-12-08 | Resolved: 2021-02-13

## 2021-02-13 PROBLEM — R74.8 ELEVATED LIVER ENZYMES: Status: RESOLVED | Noted: 2021-02-01 | Resolved: 2021-02-13

## 2021-02-13 PROBLEM — E53.8 VITAMIN B12 DEFICIENCY: Status: RESOLVED | Noted: 2018-07-24 | Resolved: 2021-02-13

## 2021-02-13 PROBLEM — Z79.899 POLYPHARMACY: Status: RESOLVED | Noted: 2019-10-20 | Resolved: 2021-02-13

## 2021-02-13 PROBLEM — I50.22 CHRONIC HFREF (HEART FAILURE WITH REDUCED EJECTION FRACTION) (HCC): Status: ACTIVE | Noted: 2021-02-13

## 2021-02-13 PROBLEM — Z79.4 DIABETES MELLITUS TYPE 2, INSULIN DEPENDENT (HCC): Status: ACTIVE | Noted: 2021-02-13

## 2021-02-13 PROBLEM — E87.70 FLUID OVERLOAD: Status: RESOLVED | Noted: 2018-11-20 | Resolved: 2021-02-13

## 2021-02-13 PROBLEM — I50.33 ACUTE ON CHRONIC DIASTOLIC HEART FAILURE (HCC): Status: RESOLVED | Noted: 2018-11-21 | Resolved: 2021-02-13

## 2021-02-13 PROBLEM — I48.0 PAROXYSMAL ATRIAL FIBRILLATION (HCC): Status: ACTIVE | Noted: 2021-02-13

## 2021-02-13 PROBLEM — E66.09 CLASS 2 OBESITY DUE TO EXCESS CALORIES WITHOUT SERIOUS COMORBIDITY WITH BODY MASS INDEX (BMI) OF 37.0 TO 37.9 IN ADULT: Status: RESOLVED | Noted: 2018-10-22 | Resolved: 2021-02-13

## 2021-02-13 PROBLEM — I48.20 CHRONIC ATRIAL FIBRILLATION (HCC): Status: RESOLVED | Noted: 2018-02-08 | Resolved: 2021-02-13

## 2021-02-13 PROBLEM — R07.89 ATYPICAL CHEST PAIN: Status: RESOLVED | Noted: 2018-07-09 | Resolved: 2021-02-13

## 2021-02-13 PROBLEM — E27.40 ADRENAL INSUFFICIENCY (HCC): Status: RESOLVED | Noted: 2020-11-10 | Resolved: 2021-02-13

## 2021-02-13 PROBLEM — R42 DIZZINESS: Status: RESOLVED | Noted: 2018-07-13 | Resolved: 2021-02-13

## 2021-02-13 PROBLEM — R33.9 RETENTION OF URINE: Status: RESOLVED | Noted: 2018-09-27 | Resolved: 2021-02-13

## 2021-02-13 PROBLEM — H10.30 ACUTE CONJUNCTIVITIS: Status: RESOLVED | Noted: 2018-07-11 | Resolved: 2021-02-13

## 2021-02-13 PROBLEM — I42.8 NICM (NONISCHEMIC CARDIOMYOPATHY) (HCC): Status: RESOLVED | Noted: 2020-11-08 | Resolved: 2021-02-13

## 2021-02-13 PROBLEM — R60.0 BILATERAL LEG EDEMA: Status: RESOLVED | Noted: 2018-11-20 | Resolved: 2021-02-13

## 2021-02-13 PROBLEM — I87.2 CHRONIC VENOUS INSUFFICIENCY OF LOWER EXTREMITY: Status: ACTIVE | Noted: 2021-02-13

## 2021-02-13 PROBLEM — F41.9 ANXIETY AND DEPRESSION: Status: RESOLVED | Noted: 2020-12-08 | Resolved: 2021-02-13

## 2021-02-13 PROBLEM — R73.9 HYPERGLYCEMIA: Status: RESOLVED | Noted: 2017-11-11 | Resolved: 2021-02-13
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
## 2021-02-13 PROBLEM — F33.3 MAJOR DEPRESSIVE DISORDER, RECURRENT, SEVERE WITH PSYCHOTIC FEATURES (HCC): Status: ACTIVE | Noted: 2021-02-13

## 2021-02-13 PROBLEM — I50.22 CHRONIC SYSTOLIC HEART FAILURE (HCC): Status: RESOLVED | Noted: 2018-11-20 | Resolved: 2021-02-13

## 2021-02-13 PROBLEM — M25.562 LEFT KNEE PAIN: Status: RESOLVED | Noted: 2017-11-23 | Resolved: 2021-02-13

## 2021-02-13 PROBLEM — I48.91 AF (ATRIAL FIBRILLATION) (HCC): Status: RESOLVED | Noted: 2021-02-09 | Resolved: 2021-02-13

## 2021-02-13 PROBLEM — R07.89 OTHER CHEST PAIN: Status: RESOLVED | Noted: 2018-09-10 | Resolved: 2021-02-13

## 2021-02-13 LAB
ANION GAP SERPL CALC-SCNC: 15 MMOL/L (ref 7–16)
BUN SERPL-MCNC: 28 MG/DL (ref 8–22)
CALCIUM SERPL-MCNC: 9.5 MG/DL (ref 8.5–10.5)
CHLORIDE SERPL-SCNC: 99 MMOL/L (ref 96–112)
CO2 SERPL-SCNC: 24 MMOL/L (ref 20–33)
CREAT SERPL-MCNC: 1.07 MG/DL (ref 0.5–1.4)
ERYTHROCYTE [DISTWIDTH] IN BLOOD BY AUTOMATED COUNT: 45.1 FL (ref 35.9–50)
GLUCOSE BLD-MCNC: 117 MG/DL (ref 65–99)
GLUCOSE BLD-MCNC: 122 MG/DL (ref 65–99)
GLUCOSE BLD-MCNC: 132 MG/DL (ref 65–99)
GLUCOSE BLD-MCNC: 166 MG/DL (ref 65–99)
GLUCOSE BLD-MCNC: 99 MG/DL (ref 65–99)
GLUCOSE SERPL-MCNC: 100 MG/DL (ref 65–99)
HCT VFR BLD AUTO: 44.8 % (ref 42–52)
HGB BLD-MCNC: 14.9 G/DL (ref 14–18)
MCH RBC QN AUTO: 29.4 PG (ref 27–33)
MCHC RBC AUTO-ENTMCNC: 33.3 G/DL (ref 33.7–35.3)
MCV RBC AUTO: 88.4 FL (ref 81.4–97.8)
PLATELET # BLD AUTO: 229 K/UL (ref 164–446)
PMV BLD AUTO: 10.8 FL (ref 9–12.9)
POTASSIUM SERPL-SCNC: 3.8 MMOL/L (ref 3.6–5.5)
RBC # BLD AUTO: 5.07 M/UL (ref 4.7–6.1)
SODIUM SERPL-SCNC: 138 MMOL/L (ref 135–145)
WBC # BLD AUTO: 10.8 K/UL (ref 4.8–10.8)

## 2021-02-13 PROCEDURE — 36415 COLL VENOUS BLD VENIPUNCTURE: CPT

## 2021-02-13 PROCEDURE — A9270 NON-COVERED ITEM OR SERVICE: HCPCS | Performed by: HOSPITALIST

## 2021-02-13 PROCEDURE — 99233 SBSQ HOSP IP/OBS HIGH 50: CPT | Performed by: INTERNAL MEDICINE

## 2021-02-13 PROCEDURE — 700102 HCHG RX REV CODE 250 W/ 637 OVERRIDE(OP): Performed by: GENERAL PRACTICE

## 2021-02-13 PROCEDURE — 99233 SBSQ HOSP IP/OBS HIGH 50: CPT | Mod: GC | Performed by: HOSPITALIST

## 2021-02-13 PROCEDURE — A9270 NON-COVERED ITEM OR SERVICE: HCPCS | Performed by: GENERAL PRACTICE

## 2021-02-13 PROCEDURE — 700102 HCHG RX REV CODE 250 W/ 637 OVERRIDE(OP): Performed by: HOSPITALIST

## 2021-02-13 PROCEDURE — A9270 NON-COVERED ITEM OR SERVICE: HCPCS | Performed by: INTERNAL MEDICINE

## 2021-02-13 PROCEDURE — 700111 HCHG RX REV CODE 636 W/ 250 OVERRIDE (IP): Performed by: INTERNAL MEDICINE

## 2021-02-13 PROCEDURE — 306311 ANTI-EMBOLISM STOCKINGS XXLRG LNG: Performed by: GENERAL PRACTICE

## 2021-02-13 PROCEDURE — 700111 HCHG RX REV CODE 636 W/ 250 OVERRIDE (IP): Performed by: STUDENT IN AN ORGANIZED HEALTH CARE EDUCATION/TRAINING PROGRAM

## 2021-02-13 PROCEDURE — 80048 BASIC METABOLIC PNL TOTAL CA: CPT

## 2021-02-13 PROCEDURE — 85027 COMPLETE CBC AUTOMATED: CPT

## 2021-02-13 PROCEDURE — 770020 HCHG ROOM/CARE - TELE (206)

## 2021-02-13 PROCEDURE — 82962 GLUCOSE BLOOD TEST: CPT

## 2021-02-13 PROCEDURE — 700102 HCHG RX REV CODE 250 W/ 637 OVERRIDE(OP): Performed by: INTERNAL MEDICINE

## 2021-02-13 RX ORDER — COSYNTROPIN 0.25 MG/ML
250 INJECTION, POWDER, FOR SOLUTION INTRAMUSCULAR; INTRAVENOUS ONCE
Status: COMPLETED | OUTPATIENT
Start: 2021-02-14 | End: 2021-02-14

## 2021-02-13 RX ORDER — LORAZEPAM 2 MG/ML
0.5 INJECTION INTRAMUSCULAR ONCE
Status: COMPLETED | OUTPATIENT
Start: 2021-02-13 | End: 2021-02-13

## 2021-02-13 RX ORDER — POTASSIUM CHLORIDE 20 MEQ/1
20 TABLET, EXTENDED RELEASE ORAL ONCE
Status: COMPLETED | OUTPATIENT
Start: 2021-02-13 | End: 2021-02-13

## 2021-02-13 RX ORDER — MIDODRINE HYDROCHLORIDE 5 MG/1
5 TABLET ORAL
Status: DISCONTINUED | OUTPATIENT
Start: 2021-02-13 | End: 2021-02-14

## 2021-02-13 RX ADMIN — ENOXAPARIN SODIUM 120 MG: 120 INJECTION SUBCUTANEOUS at 17:30

## 2021-02-13 RX ADMIN — NYSTATIN: 100000 POWDER TOPICAL at 17:32

## 2021-02-13 RX ADMIN — POTASSIUM CHLORIDE 20 MEQ: 1500 TABLET, EXTENDED RELEASE ORAL at 09:38

## 2021-02-13 RX ADMIN — MODAFINIL 100 MG: 100 TABLET ORAL at 05:21

## 2021-02-13 RX ADMIN — ENOXAPARIN SODIUM 120 MG: 120 INJECTION SUBCUTANEOUS at 05:00

## 2021-02-13 RX ADMIN — LORAZEPAM 0.5 MG: 2 INJECTION INTRAMUSCULAR; INTRAVENOUS at 02:15

## 2021-02-13 RX ADMIN — MIDODRINE HYDROCHLORIDE 2.5 MG: 5 TABLET ORAL at 09:38

## 2021-02-13 RX ADMIN — LEVOTHYROXINE SODIUM 112 MCG: 0.11 TABLET ORAL at 05:00

## 2021-02-13 RX ADMIN — LORAZEPAM 0.5 MG: 2 INJECTION INTRAMUSCULAR; INTRAVENOUS at 21:36

## 2021-02-13 RX ADMIN — INSULIN HUMAN 2 UNITS: 100 INJECTION, SOLUTION PARENTERAL at 21:33

## 2021-02-13 RX ADMIN — ATORVASTATIN CALCIUM 40 MG: 40 TABLET, FILM COATED ORAL at 17:30

## 2021-02-13 RX ADMIN — FUROSEMIDE 20 MG: 20 TABLET ORAL at 05:00

## 2021-02-13 RX ADMIN — INSULIN GLARGINE 15 UNITS: 100 INJECTION, SOLUTION SUBCUTANEOUS at 18:02

## 2021-02-13 RX ADMIN — MIDODRINE HYDROCHLORIDE 5 MG: 5 TABLET ORAL at 21:36

## 2021-02-13 RX ADMIN — DIGOXIN 125 MCG: 125 TABLET ORAL at 17:30

## 2021-02-13 ASSESSMENT — PAIN DESCRIPTION - PAIN TYPE
TYPE: ACUTE PAIN
TYPE: ACUTE PAIN

## 2021-02-13 ASSESSMENT — ENCOUNTER SYMPTOMS
DEPRESSION: 1
LOSS OF CONSCIOUSNESS: 1
NERVOUS/ANXIOUS: 0
HEARTBURN: 0
DOUBLE VISION: 0
VOMITING: 0
ABDOMINAL PAIN: 0
APNEA: 0
INSOMNIA: 1
GASTROINTESTINAL NEGATIVE: 1
PALPITATIONS: 0
NAUSEA: 0
DIARRHEA: 0
CHILLS: 0
FALLS: 1
SEIZURES: 0
HEADACHES: 0
BLURRED VISION: 0
ORTHOPNEA: 0
CONSTIPATION: 0
MYALGIAS: 0
CHEST TIGHTNESS: 0
CHOKING: 0
SHORTNESS OF BREATH: 0
STRIDOR: 0
FEVER: 0
EYES NEGATIVE: 1
HALLUCINATIONS: 1
DIZZINESS: 1
WHEEZING: 0
WEAKNESS: 0
TREMORS: 0
TINGLING: 0
COUGH: 0
CARDIOVASCULAR NEGATIVE: 1
WEIGHT LOSS: 0
RESPIRATORY NEGATIVE: 1

## 2021-02-13 ASSESSMENT — FIBROSIS 4 INDEX: FIB4 SCORE: 1.32

## 2021-02-13 ASSESSMENT — LIFESTYLE VARIABLES: SUBSTANCE_ABUSE: 0

## 2021-02-13 NOTE — PROGRESS NOTES
2 RN Skin Check    2 RN skin check complete.   Devices in place:None  Skin assessed under devices: N\A.  Confirmed pressure ulcers found on: N/A.  New potential pressure ulcers noted on N/A.   The following interventions in place Mepilex  Wound consult placed N/A    Midline abdominal incision present, dressing changed today.  Colostomy present on the left lower quadrant. Dressing changed prior to transfer.   Blanchable redness noted on the sacrum, mepilex applied, not open, intact.   B/L heels and elbow blanching, no open wounds, intact.

## 2021-02-13 NOTE — CARE PLAN
Problem: Communication  Goal: The ability to communicate needs accurately and effectively will improve  Outcome: PROGRESSING AS EXPECTED     Problem: Safety  Goal: Will remain free from injury  Outcome: PROGRESSING AS EXPECTED     Problem: Pain Management  Goal: Pain level will decrease to patient's comfort goal  Outcome: PROGRESSING AS EXPECTED     Patient alert and oriented, pleasant. Sitter at bedside. Patient was able to sit up in the chair for 30 minuts, tolerated well. Attempted to ambulate patient however patient felt dizzy so patient was assisted back to the bed. Orthostatics are charted, discussed with cardiology ARNP. No complaints of pain or SOB at this time, will continue to monitor.

## 2021-02-13 NOTE — PROGRESS NOTES
Assumed care of patient and bedside report received from previous RN. Patient educated on importance of calling, bed controls on, bed locked and in lowest position, call light with patient. Appropriate fall precautions in place. Belongs and bedside table in reach. No needs at this time. Sitter in room, Pt. On legal hold for SI.

## 2021-02-13 NOTE — PROGRESS NOTES
Pt. C/o not being able to sleep and increased anxiety. Pt. States he feels like he going to have a panic attack. notified Md on call Dr. Willis. Ativan .5mg IV ordered see MAR

## 2021-02-13 NOTE — CARE PLAN
Problem: Safety  Goal: Will remain free from injury  Outcome: PROGRESSING AS EXPECTED  Intervention: Collaborate with Interdisciplinary Team for safe transfer and mobilization techniques  Flowsheets (Taken 2/12/2021 2030)  Assistive Devices: Walker - front wheel     Problem: Infection  Goal: Will remain free from infection  Outcome: PROGRESSING AS EXPECTED  Intervention: Implement standard precautions and perform hand washing before and after patient contact  Note: Hand hygiene performed pre and post assessment

## 2021-02-13 NOTE — WOUND TEAM
"      [facility]   Inpatient Services   Established Ostomy Management/ troubleshooting  HPI: Reviewed  PMH: Reviewed   SH: Reviewed   Reason for Ostomy nurse consult:  Established colostomy    Subjective: ostomy intact, extra tape was added to inferior edge. Patient up in chair     Colostomy 11/10/20 Transverse LUQ (Active)   Stomal Appliance Assessment Clean;Dry;Intact;Changed 02/13/21 1100   Stoma Assessment Red 02/13/21 1100   Stoma Shape Budded Less Than One Inch 02/13/21 1100   Peristomal Assessment Pink;Denuded 02/13/21 1100   Mucocutaneous Junction Intact 02/13/21 1100   Treatment Appliance Changed;Bag Change;Site care;Cleansed with water/washcloth;Crusted with stoma powder 02/13/21 1100   Peristomal Protectant No Sting Skin Prep;Stoma Powder;Paste Ring 02/13/21 1100   Stomal Appliance Paste Ring, 2\";2 1/4\" (57mm) CTF 02/13/21 1100   Output (mL) 150 mL 02/13/21 1100   Output Color Brown 02/13/21 1100   WOUND RN ONLY - Stomal Appliance  2 Piece;2 1/4\" (57mm) CTF;Paste Ring, 2\" 02/13/21 1100   Appliance Brand [place] 02/13/21 1100   WOUND NURSE ONLY - Time Spent with Patient (mins) 45 02/13/21 1100     Ostomy Appliance (type and size): 2 1/4 2 piece with UT     Interventions and Education: removed current appliance. Cleansed stoma and peristomal area with moist wash cloth. Crusted peristomal skin with stoma powder and no sting skin prep. Used template in bag and cut barrier to fit. Applied paste ring to barrier, applied to patient. Attached bag to barrier. Closed end of pouch.     Evaluation: Patient established colostomy, home health nurse was completing care.     Plan: wound team to follow up due to leaking, if peristomal skin continues to improve at next change, can switch to weekly or PRN follow up.     Anticipated discharge needs: patient would benefit from SNF or Home health for follow up care.     "

## 2021-02-14 PROBLEM — R55 SYNCOPE: Status: RESOLVED | Noted: 2021-02-09 | Resolved: 2021-02-14

## 2021-02-14 LAB
CHOLEST SERPL-MCNC: 146 MG/DL (ref 100–199)
CORTIS SERPL-MCNC: 15.6 UG/DL (ref 0–23)
CORTIS SERPL-MCNC: 17.6 UG/DL (ref 0–23)
CORTIS SERPL-MCNC: 19.4 UG/DL (ref 0–23)
CORTIS SERPL-MCNC: 22.2 UG/DL (ref 0–23)
EST. AVERAGE GLUCOSE BLD GHB EST-MCNC: 103 MG/DL
GLUCOSE BLD-MCNC: 111 MG/DL (ref 65–99)
GLUCOSE BLD-MCNC: 118 MG/DL (ref 65–99)
GLUCOSE BLD-MCNC: 126 MG/DL (ref 65–99)
GLUCOSE BLD-MCNC: 139 MG/DL (ref 65–99)
HBA1C MFR BLD: 5.2 % (ref 0–5.6)
HDLC SERPL-MCNC: 32 MG/DL
LDLC SERPL CALC-MCNC: 82 MG/DL
TRIGL SERPL-MCNC: 160 MG/DL (ref 0–149)
VIT B12 SERPL-MCNC: 480 PG/ML (ref 211–911)

## 2021-02-14 PROCEDURE — 82533 TOTAL CORTISOL: CPT | Mod: 91

## 2021-02-14 PROCEDURE — A9270 NON-COVERED ITEM OR SERVICE: HCPCS | Performed by: INTERNAL MEDICINE

## 2021-02-14 PROCEDURE — 700102 HCHG RX REV CODE 250 W/ 637 OVERRIDE(OP): Performed by: INTERNAL MEDICINE

## 2021-02-14 PROCEDURE — 700102 HCHG RX REV CODE 250 W/ 637 OVERRIDE(OP): Performed by: HOSPITALIST

## 2021-02-14 PROCEDURE — 83036 HEMOGLOBIN GLYCOSYLATED A1C: CPT

## 2021-02-14 PROCEDURE — 82607 VITAMIN B-12: CPT

## 2021-02-14 PROCEDURE — 80061 LIPID PANEL: CPT

## 2021-02-14 PROCEDURE — 99233 SBSQ HOSP IP/OBS HIGH 50: CPT | Performed by: INTERNAL MEDICINE

## 2021-02-14 PROCEDURE — 99232 SBSQ HOSP IP/OBS MODERATE 35: CPT | Mod: GC | Performed by: HOSPITALIST

## 2021-02-14 PROCEDURE — 36415 COLL VENOUS BLD VENIPUNCTURE: CPT

## 2021-02-14 PROCEDURE — 82962 GLUCOSE BLOOD TEST: CPT | Mod: 91

## 2021-02-14 PROCEDURE — 700111 HCHG RX REV CODE 636 W/ 250 OVERRIDE (IP): Performed by: GENERAL PRACTICE

## 2021-02-14 PROCEDURE — A9270 NON-COVERED ITEM OR SERVICE: HCPCS | Performed by: GENERAL PRACTICE

## 2021-02-14 PROCEDURE — A9270 NON-COVERED ITEM OR SERVICE: HCPCS | Performed by: HOSPITALIST

## 2021-02-14 PROCEDURE — 770020 HCHG ROOM/CARE - TELE (206)

## 2021-02-14 PROCEDURE — 700102 HCHG RX REV CODE 250 W/ 637 OVERRIDE(OP): Performed by: GENERAL PRACTICE

## 2021-02-14 PROCEDURE — 306313 ANTI-EMBOLISM STOCKINGS XXXLG REG: Performed by: GENERAL PRACTICE

## 2021-02-14 PROCEDURE — 700111 HCHG RX REV CODE 636 W/ 250 OVERRIDE (IP): Performed by: INTERNAL MEDICINE

## 2021-02-14 RX ORDER — MIDODRINE HYDROCHLORIDE 5 MG/1
7.5 TABLET ORAL
Status: DISCONTINUED | OUTPATIENT
Start: 2021-02-14 | End: 2021-02-15

## 2021-02-14 RX ORDER — HYDROXYZINE HYDROCHLORIDE 25 MG/1
25 TABLET, FILM COATED ORAL ONCE
Status: COMPLETED | OUTPATIENT
Start: 2021-02-14 | End: 2021-02-14

## 2021-02-14 RX ADMIN — NYSTATIN: 100000 POWDER TOPICAL at 17:16

## 2021-02-14 RX ADMIN — ENOXAPARIN SODIUM 120 MG: 120 INJECTION SUBCUTANEOUS at 17:15

## 2021-02-14 RX ADMIN — DIGOXIN 125 MCG: 125 TABLET ORAL at 17:15

## 2021-02-14 RX ADMIN — ZOLPIDEM TARTRATE 2.5 MG: 5 TABLET ORAL at 20:48

## 2021-02-14 RX ADMIN — LEVOTHYROXINE SODIUM 112 MCG: 0.11 TABLET ORAL at 05:13

## 2021-02-14 RX ADMIN — ATORVASTATIN CALCIUM 40 MG: 40 TABLET, FILM COATED ORAL at 17:15

## 2021-02-14 RX ADMIN — MIDODRINE HYDROCHLORIDE 5 MG: 5 TABLET ORAL at 08:35

## 2021-02-14 RX ADMIN — INSULIN GLARGINE 15 UNITS: 100 INJECTION, SOLUTION SUBCUTANEOUS at 17:16

## 2021-02-14 RX ADMIN — MIDODRINE HYDROCHLORIDE 7.5 MG: 5 TABLET ORAL at 20:48

## 2021-02-14 RX ADMIN — COSYNTROPIN 250 MCG: 0.25 INJECTION, POWDER, LYOPHILIZED, FOR SOLUTION INTRAVENOUS at 07:05

## 2021-02-14 RX ADMIN — ENOXAPARIN SODIUM 120 MG: 120 INJECTION SUBCUTANEOUS at 05:12

## 2021-02-14 RX ADMIN — FUROSEMIDE 20 MG: 20 TABLET ORAL at 05:13

## 2021-02-14 RX ADMIN — HYDROXYZINE HYDROCHLORIDE 25 MG: 25 TABLET, FILM COATED ORAL at 16:38

## 2021-02-14 RX ADMIN — MODAFINIL 100 MG: 100 TABLET ORAL at 05:13

## 2021-02-14 ASSESSMENT — ENCOUNTER SYMPTOMS
STRIDOR: 0
COUGH: 0
CHOKING: 0
APNEA: 0
WHEEZING: 0
SHORTNESS OF BREATH: 0
CHEST TIGHTNESS: 0
DIZZINESS: 0

## 2021-02-14 ASSESSMENT — FIBROSIS 4 INDEX: FIB4 SCORE: 1.32

## 2021-02-14 NOTE — PROGRESS NOTES
Assumed care of patient and bedside report received from previous RN. Patient educated on importance of calling, bed controls on, bed locked and in lowest position, call light with patient. Appropriate fall precautions in place. Belongs and bedside table in reach. No needs at this time. Safety sitter in room.

## 2021-02-14 NOTE — PROGRESS NOTES
"Daily Progress Note:     Date of Service: 2/13/2021  Primary Team: [facility] IM White Team   Attending: Jesús Harper M.D.   Senior Resident: Dr. Goddard  Intern: Dr. Jefferson  Contact:  [phone]    ID: 56 year old male, admitted for recurrent syncope, fall, orthostatic hypotension and suicidal ideation.    Chief Complaint: \"passed out\"      Subjective: No overnight events. The patient denies pain, palpitations, SOB, dizziness, but does reports lightheadedness when standing. Pt. Reports his orthostatic hypotension developed after his colostomy was placed a few months ago. Placed on Fludricortisone, but stopped after admission.  Reportedly, the patient was going to the bathroom at the temporary facility he is at and fell while going to the bathroom,witnessed by staff. Reports hitting head w/ LOC.   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 Since then has been depressed, with suicidal thoughts, and had an attempt 1.5 years ago trying to cut himself and was admitted to the hospital. Patient reports his depression and suicidal thoughts have worsened since his son passed away from COVID-19 in December 2020 with new onset auditory hallucinations. Voice reported to be evil, telling him\"you're not good\" and \"you'd be better off dead\". The patient does not report command auditory hallucinations or visual hallucinations.  Had a nightmare last night reporting someone was trying to neil him with an axe.    Orthostats yesterday and today positive.      Consultants/Specialty:  Cardiology-intereventional (Dr. Fernandez)  Psychiatry (Dr. Berger)  Psychology (Dr. Fernandez)      Review of Systems:   Review of Systems   Constitutional: Positive for malaise/fatigue. Negative for chills, fever and weight loss.   Eyes: Negative.  Negative for blurred vision and double vision.   Respiratory: Negative.  " Negative for cough and shortness of breath.    Cardiovascular: Negative.  Negative for chest pain, palpitations, orthopnea and leg swelling.   Gastrointestinal: Negative.  Negative for abdominal pain, constipation, diarrhea, heartburn, nausea and vomiting.   Musculoskeletal: Positive for falls. Negative for joint pain and myalgias.   Skin: Negative.  Negative for rash.   Neurological: Positive for dizziness and loss of consciousness. Negative for tingling, tremors, seizures, weakness and headaches.   Psychiatric/Behavioral: Positive for depression, hallucinations and suicidal ideas. Negative for substance abuse. The patient has insomnia. The patient is not nervous/anxious.        Objective Data:   Physical Exam:   Vitals:   Temp:  [36.2 °C (97.2 °F)-37 °C (98.6 °F)] 36.9 °C (98.5 °F)  Pulse:  [] 69  Resp:  [16-18] 18  BP: (103-177)/(65-88) 103/65  SpO2:  [95 %-97 %] 95 %   Physical Exam  Vitals and nursing note reviewed.   Constitutional:       General: He is not in acute distress.     Appearance: Normal appearance. He is obese. He is not ill-appearing or toxic-appearing.   HENT:      Head: Normocephalic and atraumatic.      Mouth/Throat:      Mouth: Mucous membranes are moist.   Eyes:      General:         Right eye: No discharge.         Left eye: No discharge.      Extraocular Movements: Extraocular movements intact.      Conjunctiva/sclera: Conjunctivae normal.      Pupils: Pupils are equal, round, and reactive to light.   Neck:      Comments: No JVD appreciated  Cardiovascular:      Rate and Rhythm: Normal rate and regular rhythm.      Pulses: Normal pulses.      Heart sounds: Normal heart sounds. No murmur. No friction rub. No gallop.    Pulmonary:      Effort: Pulmonary effort is normal. No respiratory distress.      Breath sounds: Normal breath sounds. No stridor. No wheezing, rhonchi or rales.   Abdominal:      General: Bowel sounds are normal. There is no distension.      Palpations: Abdomen is soft.       Tenderness: There is no abdominal tenderness. There is no guarding.      Comments: Surrounding colostomy bag LLQ: clean/dry/intact   Musculoskeletal:         General: No swelling, tenderness, deformity or signs of injury.      Cervical back: Neck supple.      Right lower leg: No edema.      Left lower leg: No edema.      Comments: Right great toe amputation.clean/dry/intact   Skin:     General: Skin is dry.      Capillary Refill: Capillary refill takes less than 2 seconds.   Neurological:      Mental Status: He is alert and oriented to person, place, and time.      Sensory: Sensory deficit present.      Motor: No weakness.      Deep Tendon Reflexes: Reflexes normal.      Comments: Impaired BLE sensation and proprioception   Psychiatric:         Attention and Perception: Attention normal. He perceives visual hallucinations.         Mood and Affect: Mood is depressed.         Speech: Speech normal. Speech is not rapid and pressured or slurred.         Behavior: Behavior is cooperative.         Thought Content: Thought content includes suicidal ideation. Thought content does not include homicidal ideation. Thought content includes suicidal plan.         Cognition and Memory: Cognition normal.         Judgment: Judgment is inappropriate.           Labs:   Recent Labs     02/13/21  0537   WBC 10.8   RBC 5.07   HEMOGLOBIN 14.9   HEMATOCRIT 44.8   MCV 88.4   MCH 29.4   RDW 45.1   PLATELETCT 229   MPV 10.8     Recent Labs     02/12/21  0430 02/13/21  0537   SODIUM 139 138   POTASSIUM 3.8 3.8   CHLORIDE 102 99   CO2 25 24   GLUCOSE 115* 100*   BUN 29* 28*       Imaging:   EC-ECHOCARDIOGRAM COMPLETE W/O CONT   Final Result      DX-LUMBAR SPINE-2 OR 3 VIEWS   Final Result      Moderate compression deformity of T11 is new compared to 2018.      Mild wedge deformity of T12 is unchanged.      Degenerative changes including facet arthropathy.      Mild retrolisthesis of L5 on S1 and L3 on L4.             Problem  Representation: 56 year old male with a history of  nonischemic cardiomyopathy with normal coronary angiography (2018), LVEF improved to 60% Feb 2020,  paroxysmal atrial fibrillation (CHADSVASC 3: CHF, HTN, DM), type 2 diabetes, hyperlipidemia, hypertension, previously on oral anticoagulation with Xarelto, medical noncompliance, BMI 38.57, history of orthostatic hypotension/syncope despite midodrine, Florinef, pyridostigmine, oral anticoagulation discontinued 2019 secondary to frequent falls/syncope (extensive evaluation with cardiology and neurology in the past), recurrent MDD with psychotic features with suicidal ideation and attempt 1.5 years ago cutting himself, presented with recurrent syncope, fall and orthostatic hypotension as well as suicidal ideation on a legal hold.      * Syncope  Assessment & Plan  Suspect secondary due to orthostatic hypotension with multiple possible etiologies to work up including autonomic dysfunction from diabetes. Chronic venous insufficiency, r/o adrenal insufficiency, medications (lasix, BB, CCB, digoxin). Does not appear dehydrated. Echocardiogram shows improved EF 60% with no concerning valvular dysfunction. No history of seizures. Not hypoglycemic.   -hold BB, CCB, Flomax  -continue telemetry  -Per Cardiology, no further cardiac work-up      Paroxysmal atrial fibrillation (HCC)  Assessment & Plan  CHADSVASC 3 (HTN, CHF, DM). Rate controlled. TTE Feb 2021: EF 60%,   -hold BB, CCB at this time due to bradycardia on admission and hypotension  -Cards restarted digoxin  -continue telemetry  -goal HR <110 at rest  -keep Mg>2, K>4  -previously on Xarelto and stopped 2019 due to fall risk; reevaluate prior to discharge if will restart on anticoagulation. Continue on therapeutic Lovenox at this time.    Suicidal ideation  Assessment & Plan  Has history of MDD, recurrent with psychotic features with chronic SI and past SA.  -on legal hold  -Psychiatry has seen. Psychology  consulted.  -1:1 sitter  -appreciate Psychiatry and Psychology recommendations        Orthostatic hypotension- (present on admission)  Assessment & Plan  Cosyntropin stimulation test, renin, ACTH, cortisol levels done, but unclear if Fludricortisone held  -repeat cortisol tomorrow at 0645, then 0700 Cosyntropin stimulation test, with repeat cortisol at 0730 and 0800  -daily orthostatic BP  -if cortisol post test <3, diagnostic for adrenal insufficiency. If cortisol >18, rules out.   -Midodrine 5mg BID        Chronic HFrEF (heart failure with reduced ejection fraction) ([facility])  Assessment & Plan  EF 35% on Echo 2020, improved on TTE Feb 2021 to EF 60%. BNP >4k->downtrending.   -continue Lasix as prescribed  -hold Lisinopril and Toprol XL at this time; may restart upon discharge  -strict I/0's      Diabetes mellitus type 2, insulin dependent ([facility])  Assessment & Plan  Controlled given co-morbidities. A1c 7.8% Nov 2020  -continue home Lantus 15U qhs  -ISS  -hypoglycemic protocol  -diabetic diet  -diabetes education  -repeat a1c    Major depressive disorder, recurrent, severe with psychotic features ([facility])  Assessment & Plan  -Psychiatry and Psychology following.  -continue Modafinil and Risperdal as prescribed  -appreciate Psychiatry and Psychology recommendations    Hypothyroidism- (present on admission)  Assessment & Plan  TSH wnl Feb 2021  -continue home Levothyroxine    Essential hypertension- (present on admission)  Assessment & Plan  Monitor vitals.      Lower limb amputation, great toe (HCC)- (present on admission)  Assessment & Plan  s/p right great toe amputation 2018    Elevated troponin  Assessment & Plan  Stable. Suspect due to demand ischemia. No need to trend further.    Debility- (present on admission)  Assessment & Plan  -PT/OT consults  -SNF possibly    Chronic venous insufficiency of lower extremity  Assessment & Plan  -elevated legs  -compression stockings  -lotion, soap/water to legs  -weight loss (BMI  38)    Colostomy present (HCC)- (present on admission)  Assessment & Plan  History of fall at home Nov 2020 on left side with perforated colon and splenic fluid collection/hematoma s/p segmental colectomy, colostomy, mobilization of the splenic flexure, wound vac, and I&D Nov 2020.   -needs follow up arranged with surgery prior to discharge    Nonischemic cardiomyopathy (HCC)- (present on admission)  Assessment & Plan  Normal coronaries, 2018.Tachycardia mediated  -LVEF improved, based on echo 2/10/2021 LVEF 60%    Mixed hyperlipidemia- (present on admission)  Assessment & Plan  LDL 68 in 2018.  -repeat lipid panel  -continue Atorvatatin    Obesity- (present on admission)  Assessment & Plan  -recommend weight loss, exercise once medically stable  -nutrition consult

## 2021-02-14 NOTE — PROGRESS NOTES
"Daily Progress Note:     Date of Service: 2/14/2021  Primary Team: [facility] White Team   Attending: Jesús Harper M.D.   Senior Resident: Dr. Goddard  Intern: Dr. Jefferson  Contact:  [phone]    ID: 56 year old male, admitted for recurrent syncope, fall, orthostatic hypotension and suicidal ideation.     Chief Complaint: \"passed out\"        Subjective: No overnight events.The patient denies pain, SOB, N/V. PO tolerating. Not ambulating yet, but has worked with PT/OT. BM, flatus. Reports continued SI w/o plan reports AH, male and evil voice with command auditory hallucinations telling him to kill himself.      Consultants/Specialty:  Cardiology-intereventional (Dr. Fernandez)  Psychiatry (Dr. Berger)  Psychology (Dr. Fernandez)        Review of Systems:   Review of Systems   Constitutional: Positive for malaise/fatigue. Negative for chills, fever and weight loss.   Eyes: Negative.  Negative for blurred vision and double vision.   Respiratory: Negative.  Negative for cough and shortness of breath.    Cardiovascular: Negative.  Negative for chest pain, palpitations, orthopnea and leg swelling.   Gastrointestinal: Negative.  Negative for abdominal pain, constipation, diarrhea, heartburn, nausea and vomiting.   Musculoskeletal: Positive for falls. Negative for joint pain and myalgias.   Skin: Negative.  Negative for rash.   Neurological: no LOC, dizziness, lightheadedness Negative for tingling, tremors, seizures, weakness and headaches.   Psychiatric/Behavioral: Positive for depression, hallucinations and suicidal ideas. Negative for substance abuse. The patient has insomnia. The patient is not nervous/anxious.          Objective Data:   Physical Exam:   Vitals:   Temp:  [36.1 °C (97 °F)-37.1 °C (98.8 °F)] 36.2 °C (97.1 °F)  Pulse:  [] 78  Resp:  [16-18] 18  BP: (102-176)/() 150/92  SpO2:  [94 %-97 %] 96 %   Physical Exam  Vitals and nursing note reviewed.   Constitutional:       General: He is not in acute " distress.     Appearance: Normal appearance. He is obese. He is not ill-appearing or toxic-appearing.   HENT:      Head: Normocephalic and atraumatic.      Mouth/Throat:      Mouth: Mucous membranes are moist.   Eyes:      General:         Right eye: No discharge.         Left eye: No discharge.      Extraocular Movements: Extraocular movements intact.      Conjunctiva/sclera: Conjunctivae normal.      Pupils: Pupils are equal, round, and reactive to light.   Neck:      Comments: No JVD appreciated  Cardiovascular:      Rate and Rhythm: Normal rate and regular rhythm.      Pulses: Normal pulses.      Heart sounds: Normal heart sounds. No murmur. No friction rub. No gallop.    Pulmonary:      Effort: Pulmonary effort is normal. No respiratory distress.      Breath sounds: Normal breath sounds. No stridor. No wheezing, rhonchi or rales.   Abdominal:      General: Bowel sounds are normal. There is no distension.      Palpations: Abdomen is soft.      Tenderness: There is no abdominal tenderness. There is no guarding.      Comments: Surrounding colostomy bag LLQ: clean/dry/intact   Musculoskeletal:         General: No swelling, tenderness, deformity or signs of injury.      Cervical back: Neck supple.      Right lower leg: No edema.      Left lower leg: No edema.      Comments: Right great toe amputation.clean/dry/intact   Skin:     General: Skin is dry.      Capillary Refill: Capillary refill takes less than 2 seconds.   Neurological:      Mental Status: He is alert and oriented to person, place, and time.      Sensory: Sensory deficit present.      Motor: No weakness.      Deep Tendon Reflexes: Reflexes normal.      Comments: Impaired BLE sensation and proprioception   Psychiatric:         Attention and Perception: Attention normal. He perceives visual hallucinations.         Mood and Affect: Mood is depressed.         Speech: Speech normal. Speech is not rapid and pressured or slurred.         Behavior: Behavior is  cooperative.         Thought Content: Thought content includes suicidal ideation. Thought content does not include homicidal ideation. Thought content includes suicidal plan.         Cognition and Memory: Cognition normal.         Judgment: Judgment is inappropriate.     Labs:   Recent Labs     02/13/21  0537   WBC 10.8   RBC 5.07   HEMOGLOBIN 14.9   HEMATOCRIT 44.8   MCV 88.4   MCH 29.4   RDW 45.1   PLATELETCT 229   MPV 10.8     Recent Labs     02/12/21  0430 02/13/21  0537   SODIUM 139 138   POTASSIUM 3.8 3.8   CHLORIDE 102 99   CO2 25 24   GLUCOSE 115* 100*   BUN 29* 28*         Imaging:   EC-ECHOCARDIOGRAM COMPLETE W/O CONT   Final Result      DX-LUMBAR SPINE-2 OR 3 VIEWS   Final Result      Moderate compression deformity of T11 is new compared to 2018.      Mild wedge deformity of T12 is unchanged.      Degenerative changes including facet arthropathy.      Mild retrolisthesis of L5 on S1 and L3 on L4.               Problem Representation: 56 year old male with a history of  nonischemic cardiomyopathy with normal coronary angiography (2018), LVEF improved to 60% Feb 2020,  paroxysmal atrial fibrillation (CHADSVASC 3: CHF, HTN, DM), type 2 diabetes, hyperlipidemia, hypertension, previously on oral anticoagulation with Xarelto, medical noncompliance, BMI 38.57, history of orthostatic hypotension/syncope despite midodrine, Florinef, pyridostigmine, oral anticoagulation discontinued 2019 secondary to frequent falls/syncope (extensive evaluation with cardiology and neurology in the past), recurrent MDD with psychotic features with suicidal ideation and attempt 1.5 years ago cutting himself, presented with recurrent syncope, fall and orthostatic hypotension as well as suicidal ideation on a legal hold.      * Orthostatic hypotension- (present on admission)  Assessment & Plan  Multifactorial, but suspect mostly contributed due to autonomic dysfunction from diabetes. Other contributing factors are due to chronic venous  insufficiency,medications (lasix, BB, CCB). Does not appear dehydrated. Echocardiogram shows improved EF 60% with no concerning valvular dysfunction. No history of seizures. Not hypoglycemic. Cosyntropin test with cortisol >18 which rules out adrenal insufficiency.patient ambulated today and was not orthostatic, but does have some dyspnea on exertion secondary Afib, HFrEF and nonischemic cardiomyopathy.not requiring supplemental oxygen.  -hold BB, CCB, Flomax  -continue telemetry  -Per Cardiology, no further cardiac work-up  -daily orthostatic BP  -add B12 given impaired proprioception. TSH wnl Feb 2021  -recommended by Cardiology to increase Midodrine to 7.5mg BID        Paroxysmal atrial fibrillation (HCC)  Assessment & Plan  CHADSVASC 3 (HTN, CHF, DM). Rate controlled. TTE Feb 2021: EF 60%,   -hold BB, CCB at this time due to bradycardia on admission and hypotension  -Cards restarted digoxin  -continue telemetry  -goal HR <110 at rest  -keep Mg>2, K>4  -previously on Xarelto and stopped 2019 due to fall risk; reevaluate prior to discharge if will restart on anticoagulation. Continue on therapeutic Lovenox at this time.    Suicidal ideation  Assessment & Plan  Has history of MDD, recurrent with psychotic features with chronic SI and past SA.  -on legal hold  -Psychiatry has seen. Psychology consulted.  -1:1 sitter  -appreciate Psychiatry and Psychology recommendations        Chronic HFrEF (heart failure with reduced ejection fraction) ([facility])  Assessment & Plan  EF 35% on Echo 2020, improved on TTE Feb 2021 to EF 60%. BNP >4k->downtrending.   -continue Lasix as prescribed  -hold Lisinopril and Toprol XL at this time; may restart upon discharge  -strict I/0's      Diabetes mellitus type 2, insulin dependent (HCC)  Assessment & Plan  Controlled given co-morbidities. A1c 7.8% Nov 2020, repeat 5.2% Feb 2021.  -continue home Lantus 15U qhs, but consider decreasing to 12U prior to discharge given well  controlled.  -ISS  -hypoglycemic protocol  -diabetic diet  -diabetes education        Major depressive disorder, recurrent, severe with psychotic features (HCC)  Assessment & Plan  -Psychiatry and Psychology following.  -continue Modafinil and Risperdal as prescribed  -appreciate Psychiatry and Psychology recommendations    Hypothyroidism- (present on admission)  Assessment & Plan  TSH wnl Feb 2021  -continue home Levothyroxine    Essential hypertension- (present on admission)  Assessment & Plan  Monitor vitals.      Lower limb amputation, great toe (HCC)- (present on admission)  Assessment & Plan  s/p right great toe amputation 2018    Elevated troponin  Assessment & Plan  Stable. Suspect due to demand ischemia. No need to trend further.    Debility- (present on admission)  Assessment & Plan  -PT/OT consults: recommend Home health PT  -patient to ambulate three times daily out of bed on floor with assistance and out of bed to chair for meals.    Chronic venous insufficiency of lower extremity  Assessment & Plan  -elevated legs  -compression stockings  -lotion, soap/water to legs  -weight loss (BMI 38)    Colostomy present (HCC)- (present on admission)  Assessment & Plan  History of fall at home Nov 2020 on left side with perforated colon and splenic fluid collection/hematoma s/p segmental colectomy, colostomy, mobilization of the splenic flexure, wound vac, and I&D Nov 2020.   -needs follow up arranged with surgery prior to discharge    Nonischemic cardiomyopathy (HCC)- (present on admission)  Assessment & Plan  Normal coronaries, 2018.Tachycardia mediated  -LVEF improved, based on echo 2/10/2021 LVEF 60%    Mixed hyperlipidemia- (present on admission)  Assessment & Plan  LDL 82 Feb 2021  -repeat lipid panel  -continue Atorvatatin    Obesity- (present on admission)  Assessment & Plan  -recommend weight loss, exercise once medically stable  -nutrition consult

## 2021-02-14 NOTE — CARE PLAN
Problem: Communication  Goal: The ability to communicate needs accurately and effectively will improve  Outcome: PROGRESSING AS EXPECTED     Problem: Safety  Goal: Will remain free from injury  Outcome: PROGRESSING AS EXPECTED     Problem: Psychosocial Needs:  Goal: Level of anxiety will decrease  Outcome: PROGRESSING AS EXPECTED     Patient alert and oriented, ambulated short distances in the room, currently sitting up in the chair, tolerating well. Sitter at bedside, patient continues to have suicidal ideation. Orthostatics done and charted. Colostomy and midline abdominal dressing dry and intact. No complaints of pain or SOB at this time. Will continue to monitor.

## 2021-02-14 NOTE — ASSESSMENT & PLAN NOTE
Normal coronaries, 2018.  Tachycardia mediated per cardiology  LVEF improved, based on echo 2/10/2021 LVEF 60%

## 2021-02-14 NOTE — PROGRESS NOTES
Cardiology Follow Up Progress Note    Date of Service  2/14/2021    Attending Physician  Wicho Colon D.O.    Past medical history significant for tachycardia mediated cardiomyopathy, nonischemic cardiomyopathy with normal coronary angiography (2018), LVEF improved,  paroxysmal atrial fibrillation, type 2 diabetes, hyperlipidemia, hypertension, previously on oral anticoagulation with Xarelto, medical noncompliance, BMI 38.57, history of orthostatic hypotension/syncope despite midodrine, Florinef, pyridostigmine, oral anticoagulation discontinued 2019 secondary to frequent falls/syncope (extensive evaluation with cardiology and neurology in the past), previous suicidal ideations previous consultation with psychiatrist    Presented with recurrent syncope, recurrent suicidal ideation      Interim Events  No overnight cardiac events  Telemetry : A. fib with slow ventricular rate on digoxin  Labs reviewed, 2/13/2021  NA, K, CR in good order  Orthostatic hypotension, midodrine, consider increasing dose to 7.5 mg twice daily.    Review of Systems  Review of Systems   Respiratory: Negative for apnea, cough, choking, chest tightness, shortness of breath, wheezing and stridor.    Cardiovascular: Negative for chest pain and leg swelling.   Neurological: Negative for dizziness.       Vital signs in last 24 hours  Temp:  [36.1 °C (97 °F)-37.1 °C (98.8 °F)] 36.1 °C (97 °F)  Pulse:  [] 96  Resp:  [16-18] 17  BP: (102-176)/() 103/64  SpO2:  [94 %-97 %] 94 %    Physical Exam  Physical Exam  Constitutional:       Comments: 1:1 sitter     Cardiovascular:      Rate and Rhythm: Normal rate. Rhythm irregular.      Pulses: Normal pulses.   Pulmonary:      Effort: Pulmonary effort is normal.   Skin:     General: Skin is warm.   Neurological:      Mental Status: He is alert.   Psychiatric:      Comments: Appears to be at baseline         Lab Review  Lab Results   Component Value Date/Time    WBC 10.8 02/13/2021 05:37 AM     RBC 5.07 02/13/2021 05:37 AM    HEMOGLOBIN 14.9 02/13/2021 05:37 AM    HEMATOCRIT 44.8 02/13/2021 05:37 AM    MCV 88.4 02/13/2021 05:37 AM    MCH 29.4 02/13/2021 05:37 AM    MCHC 33.3 (L) 02/13/2021 05:37 AM    MPV 10.8 02/13/2021 05:37 AM      Lab Results   Component Value Date/Time    SODIUM 138 02/13/2021 05:37 AM    POTASSIUM 3.8 02/13/2021 05:37 AM    CHLORIDE 99 02/13/2021 05:37 AM    CO2 24 02/13/2021 05:37 AM    GLUCOSE 100 (H) 02/13/2021 05:37 AM    BUN 28 (H) 02/13/2021 05:37 AM    CREATININE 1.07 02/13/2021 05:37 AM      Lab Results   Component Value Date/Time    ASTSGOT 33 02/09/2021 08:10 AM    ALTSGPT 40 02/09/2021 08:10 AM     Lab Results   Component Value Date/Time    CHOLSTRLTOT 146 02/14/2021 01:30 AM    LDL 82 02/14/2021 01:30 AM    HDL 32 (A) 02/14/2021 01:30 AM    TRIGLYCERIDE 160 (H) 02/14/2021 01:30 AM    TROPONINT 56 (H) 02/09/2021 03:47 PM       Recent Labs     02/12/21  0430   NTPROBNP 3221*       Cardiac Imaging and Procedures Review  EKG: Atrial fibrillation    Echocardiogram:     2/10/202 1 LVEF 60%    Diastolic function difficult to assess with A. Fib  Normal LV systolic function  Cardiac Catheterization:    Normal coronaries, 2018    Imaging  Chest X-Ray:      Stress Test: Not applicable    Assessment/Plan    Recurrent syncope  Orthostatic hypotension  -On low-dose midodrine  -Consider increasing it to 7.5 mg twice daily  -SBP low 100s standing  -- sitting    Concern for adrenal insufficiency  -We will defer to primary    Paroxysmal A. Fib  -In A. fib with slow ventricular rate.  -On low-dose digoxin.  -Previously on oral anticoagulation with Xarelto but discontinued  2019 secondary to frequent falls/syncope.  -Currently on therapeutic Lovenox.  -At the time of discharge with reevaluate fall risk and determine safety of long-term anticoagulation    Nonischemic cardiomyopathy  Normal coronaries, 2018  Tachycardia mediated  -LVEF improved, based on echo 2/10/2021 LVEF  60%      Suicidal ideation  -On legal hold  -One-on-one sitter  -Psychiatry on board, appreciate recommendations        Thank you for allowing me to participate in the care of this patient.      Please contact me with any questions.    VALERIA Le.   Cardiologist, [facility]  [phone]

## 2021-02-14 NOTE — FACE TO FACE
Face to Face Supporting Documentation - Home Health    The encounter with this patient was in whole or in part the primary reason for home health admission.    Date of encounter:   Patient:                    MRN:                       YOB: 2023  Sebastián Castro  [id number]  1965     Home health to see patient for:  Skilled Nursing care for assessment, interventions & education and Physical Therapy evaluation and treatment    Skilled need for:  Recent Deterioration of Health Status orthostatic hypotension    Skilled nursing interventions to include:  Comment: colostomy care    Homebound status evidenced by:  Needs the assistance of another person in order to leave the home. Leaving home requires a considerable and taxing effort. There is a normal inability to leave the home.    Community Physician to provide follow up care: ELIZABETH Terrazas     Optional Interventions? No      I certify the face to face encounter for this home health care referral meets the CMS requirements and the encounter/clinical assessment with the patient was, in whole, or in part, for the medical condition(s) listed above, which is the primary reason for home health care. Based on my clinical findings: the service(s) are medically necessary, support the need for home health care, and the homebound criteria are met.  I certify that this patient has had a face to face encounter by myself.  Jeremías Jefferson Jr., M.D. - NPI: [id number]

## 2021-02-14 NOTE — CARE PLAN
Problem: Safety  Goal: Will remain free from falls  Outcome: PROGRESSING AS EXPECTED  Intervention: Implement fall precautions  Flowsheets  Taken 2/13/2021 2229  Bed Alarm: Yes - Alarm On  Taken 2/13/2021 2000  Environmental Precautions:   Treaded Slipper Socks on Patient   Personal Belongings, Wastebasket, Call Bell etc. in Easy Reach   Transferred to Stronger Side   Report Given to Other Health Care Providers Regarding Fall Risk   Bed in Low Position   Communication Sign for Patients & Families   Mobility Assessed & Appropriate Sign Placed     Problem: Infection  Goal: Will remain free from infection  Outcome: PROGRESSING AS EXPECTED     Problem: Venous Thromboembolism (VTW)/Deep Vein Thrombosis (DVT) Prevention:  Goal: Patient will participate in Venous Thrombosis (VTE)/Deep Vein Thrombosis (DVT)Prevention Measures  Outcome: PROGRESSING AS EXPECTED

## 2021-02-14 NOTE — DISCHARGE PLANNING
Anticipated Discharge Disposition: [facility] home [facility] for Colostomy Care.     Action: Patient was discussed at IDT rounds today, not medically cleared for discharge. Home health referral placed for colostomy care. This RN,  discharged patient his last admission to [facility] with [facility]. Referral was faxed to [facility], Armida Abreu to [facility] health.     Barriers to Discharge: Pending medical clearance, patient has orthostatic hypotension, with vertigo. Accepting home health, accepting housing at [facility].     Plan:  to follow up with [facility] for acceptance, patient was discharged to [facility] last admission, follow up with [facility] for [facility] home health acceptance.     Luda Garduno RN,

## 2021-02-15 PROBLEM — R55 SYNCOPE: Status: ACTIVE | Noted: 2021-02-15

## 2021-02-15 LAB
GLUCOSE BLD-MCNC: 108 MG/DL (ref 65–99)
GLUCOSE BLD-MCNC: 108 MG/DL (ref 65–99)
GLUCOSE BLD-MCNC: 109 MG/DL (ref 65–99)
GLUCOSE BLD-MCNC: 119 MG/DL (ref 65–99)
RENIN PLAS-CCNC: 0.1 NG/ML/HR

## 2021-02-15 PROCEDURE — 700102 HCHG RX REV CODE 250 W/ 637 OVERRIDE(OP): Performed by: HOSPITALIST

## 2021-02-15 PROCEDURE — 82962 GLUCOSE BLOOD TEST: CPT | Mod: 91

## 2021-02-15 PROCEDURE — A9270 NON-COVERED ITEM OR SERVICE: HCPCS | Performed by: PHYSICIAN ASSISTANT

## 2021-02-15 PROCEDURE — 97530 THERAPEUTIC ACTIVITIES: CPT | Mod: CQ

## 2021-02-15 PROCEDURE — 97116 GAIT TRAINING THERAPY: CPT | Mod: CQ

## 2021-02-15 PROCEDURE — 99233 SBSQ HOSP IP/OBS HIGH 50: CPT | Performed by: INTERNAL MEDICINE

## 2021-02-15 PROCEDURE — A9270 NON-COVERED ITEM OR SERVICE: HCPCS | Performed by: HOSPITALIST

## 2021-02-15 PROCEDURE — 700102 HCHG RX REV CODE 250 W/ 637 OVERRIDE(OP): Performed by: PHYSICIAN ASSISTANT

## 2021-02-15 PROCEDURE — 770020 HCHG ROOM/CARE - TELE (206)

## 2021-02-15 PROCEDURE — 99232 SBSQ HOSP IP/OBS MODERATE 35: CPT | Mod: GC | Performed by: HOSPITALIST

## 2021-02-15 PROCEDURE — 700111 HCHG RX REV CODE 636 W/ 250 OVERRIDE (IP): Performed by: INTERNAL MEDICINE

## 2021-02-15 PROCEDURE — 700102 HCHG RX REV CODE 250 W/ 637 OVERRIDE(OP): Performed by: INTERNAL MEDICINE

## 2021-02-15 PROCEDURE — A9270 NON-COVERED ITEM OR SERVICE: HCPCS | Performed by: INTERNAL MEDICINE

## 2021-02-15 PROCEDURE — 700102 HCHG RX REV CODE 250 W/ 637 OVERRIDE(OP): Performed by: GENERAL PRACTICE

## 2021-02-15 PROCEDURE — A9270 NON-COVERED ITEM OR SERVICE: HCPCS | Performed by: GENERAL PRACTICE

## 2021-02-15 RX ORDER — MIDODRINE HYDROCHLORIDE 5 MG/1
5 TABLET ORAL 4 TIMES DAILY
Status: DISCONTINUED | OUTPATIENT
Start: 2021-02-15 | End: 2021-03-01

## 2021-02-15 RX ADMIN — ZOLPIDEM TARTRATE 2.5 MG: 5 TABLET ORAL at 19:39

## 2021-02-15 RX ADMIN — MIDODRINE HYDROCHLORIDE 5 MG: 5 TABLET ORAL at 17:18

## 2021-02-15 RX ADMIN — LEVOTHYROXINE SODIUM 112 MCG: 0.11 TABLET ORAL at 05:24

## 2021-02-15 RX ADMIN — ATORVASTATIN CALCIUM 40 MG: 40 TABLET, FILM COATED ORAL at 17:17

## 2021-02-15 RX ADMIN — NYSTATIN: 100000 POWDER TOPICAL at 17:18

## 2021-02-15 RX ADMIN — DIGOXIN 125 MCG: 125 TABLET ORAL at 17:17

## 2021-02-15 RX ADMIN — MODAFINIL 100 MG: 100 TABLET ORAL at 06:55

## 2021-02-15 RX ADMIN — INSULIN GLARGINE 15 UNITS: 100 INJECTION, SOLUTION SUBCUTANEOUS at 17:25

## 2021-02-15 RX ADMIN — NYSTATIN: 100000 POWDER TOPICAL at 05:24

## 2021-02-15 RX ADMIN — MIDODRINE HYDROCHLORIDE 5 MG: 5 TABLET ORAL at 19:39

## 2021-02-15 RX ADMIN — ENOXAPARIN SODIUM 120 MG: 120 INJECTION SUBCUTANEOUS at 05:24

## 2021-02-15 RX ADMIN — FUROSEMIDE 20 MG: 20 TABLET ORAL at 05:24

## 2021-02-15 RX ADMIN — MIDODRINE HYDROCHLORIDE 7.5 MG: 5 TABLET ORAL at 11:11

## 2021-02-15 RX ADMIN — ENOXAPARIN SODIUM 120 MG: 120 INJECTION SUBCUTANEOUS at 17:18

## 2021-02-15 ASSESSMENT — GAIT ASSESSMENTS
GAIT LEVEL OF ASSIST: SUPERVISED
ASSISTIVE DEVICE: FRONT WHEEL WALKER
DISTANCE (FEET): 150
DEVIATION: BRADYKINETIC;SHUFFLED GAIT;OTHER (COMMENT)

## 2021-02-15 ASSESSMENT — FIBROSIS 4 INDEX: FIB4 SCORE: 1.32

## 2021-02-15 ASSESSMENT — COGNITIVE AND FUNCTIONAL STATUS - GENERAL
WALKING IN HOSPITAL ROOM: A LITTLE
TURNING FROM BACK TO SIDE WHILE IN FLAT BAD: A LITTLE
MOBILITY SCORE: 18
MOVING FROM LYING ON BACK TO SITTING ON SIDE OF FLAT BED: A LITTLE
SUGGESTED CMS G CODE MODIFIER MOBILITY: CK
CLIMB 3 TO 5 STEPS WITH RAILING: A LITTLE
MOVING TO AND FROM BED TO CHAIR: A LITTLE
STANDING UP FROM CHAIR USING ARMS: A LITTLE

## 2021-02-15 NOTE — PROGRESS NOTES
Notified Dr. Ley regarding patients blood pressure of 172/93, per Dr. Ley hold Midodrine (Proamatine) tablet 7.5 mg, patient asymptomatic, no new orders at this time. Will continue to monitor.

## 2021-02-15 NOTE — DISCHARGE PLANNING
Anticipated Discharge Disposition: TBD    Action: Patient needing 4 wheel walker. LSW spoke to patient at bedside. Patient has no preference for DME company.     LSW faxed choice form to [employer] [facility]ier[facility].    Barriers to Discharge: TBD    Plan: LSW to follow up on referral

## 2021-02-15 NOTE — PROGRESS NOTES
"Daily Progress Note:     Date of Service: 2/15/2021  Primary Team: [facility] IM White Team   Attending: Jesús Harper M.D.   Senior Resident: Dr. Ley  Intern: Dr. Jefferson  Contact:  [phone]    ID: 56 year old male, admitted for recurrent syncope, fall, orthostatic hypotension and suicidal ideation.     Chief Complaint: \"passed out\"        Subjective: No overnight events.The patient denies pain, SOB, N/V. PO tolerating. Not ambulating yet, but has worked with PT/OT. BM, flatus. Reports continued SI w/o plan reports AH, male and evil voice with command auditory hallucinations telling him to kill himself. On telemetry: Afib 64-92.    Interval Update: Per Psychology that saw the patient today  1. Legal Hold continued  2. Patient can be transitioned to a Tele-sitter if available. If Tele siter is put in place,  the one on one sitter can be discontinued.  3. Medication evaluation recommended  4.Patient may have access to TV remote  5. Follow all other Legal Hold protocols  6. Transfer to an inpatient  psychiatric facility when patient is medically cleared.        Consultants/Specialty:  Cardiology-intereventional (Dr. Fernandez)  Psychiatry (Dr. Berger)  Psychology (Dr. Fernandez)  Behavioral Health (therapist-Susan Ewing PhD, [facility])        Review of Systems:   Review of Systems   Constitutional: Positive for malaise/fatigue. Negative for chills, fever and weight loss.   Eyes: Negative.  Negative for blurred vision and double vision.   Respiratory: Negative.  Negative for cough and shortness of breath.    Cardiovascular: Negative.  Negative for chest pain, palpitations, orthopnea and leg swelling.   Gastrointestinal: Negative.  Negative for abdominal pain, constipation, diarrhea, heartburn, nausea and vomiting.   Musculoskeletal: Positive for falls. Negative for joint pain and myalgias.   Skin: Negative.  Negative for rash.   Neurological: no LOC, dizziness, lightheadedness Negative " for tingling, tremors, seizures, weakness and headaches.   Psychiatric/Behavioral: Positive for depression, hallucinations and suicidal ideas. Negative for substance abuse. The patient has insomnia. The patient is not nervous/anxious.            Objective Data:   Physical Exam:   Vitals:   Temp:  [35.8 °C (96.5 °F)-36.2 °C (97.1 °F)] 35.8 °C (96.5 °F)  Pulse:  [55-98] 83  Resp:  [16-18] 18  BP: (116-172)/(58-93) 166/92  SpO2:  [93 %-95 %] 94 %   Physical Exam  Vitals and nursing note reviewed.   Constitutional:       General: He is not in acute distress.     Appearance: Normal appearance. He is obese. He is not ill-appearing or toxic-appearing.   HENT:      Head: Normocephalic and atraumatic.      Mouth/Throat:      Mouth: Mucous membranes are moist.   Eyes:      General:         Right eye: No discharge.         Left eye: No discharge.      Extraocular Movements: Extraocular movements intact.      Conjunctiva/sclera: Conjunctivae normal.      Pupils: Pupils are equal, round, and reactive to light.   Neck:      Comments: No JVD appreciated  Cardiovascular:      Rate and Rhythm: Normal rate and regular rhythm.      Pulses: Normal pulses.      Heart sounds: Normal heart sounds. No murmur. No friction rub. No gallop.    Pulmonary:      Effort: Pulmonary effort is normal. No respiratory distress.      Breath sounds: Normal breath sounds. No stridor. No wheezing, rhonchi or rales.   Abdominal:      General: Bowel sounds are normal. There is no distension.      Palpations: Abdomen is soft.      Tenderness: There is no abdominal tenderness. There is no guarding.      Comments: Surrounding colostomy bag LLQ: clean/dry/intact   Musculoskeletal:         General: No swelling, tenderness, deformity or signs of injury.      Cervical back: Neck supple.      Right lower leg: No edema.      Left lower leg: No edema.      Comments: Right great toe amputation.clean/dry/intact   Skin:     General: Skin is dry.      Capillary Refill:  Capillary refill takes less than 2 seconds.   Neurological:      Mental Status: He is alert and oriented to person, place, and time.      Sensory: Sensory deficit present.      Motor: No weakness.      Deep Tendon Reflexes: Reflexes normal.      Comments: Impaired BLE sensation and proprioception   Psychiatric:         Attention and Perception: Attention normal. He perceives visual hallucinations.         Mood and Affect: Mood is depressed.         Speech: Speech normal. Speech is not rapid and pressured or slurred.         Behavior: Behavior is cooperative.         Thought Content: Thought content includes suicidal ideation. Thought content does not include homicidal ideation. Thought content includes suicidal plan.         Cognition and Memory: Cognition normal.         Judgment: Judgment is inappropriate.     Labs:   Recent Labs     02/13/21  0537   WBC 10.8   RBC 5.07   HEMOGLOBIN 14.9   HEMATOCRIT 44.8   MCV 88.4   MCH 29.4   RDW 45.1   PLATELETCT 229   MPV 10.8     Recent Labs     02/13/21  0537   SODIUM 138   POTASSIUM 3.8   CHLORIDE 99   CO2 24   GLUCOSE 100*   BUN 28*         Imaging:   EC-ECHOCARDIOGRAM COMPLETE W/O CONT   Final Result      DX-LUMBAR SPINE-2 OR 3 VIEWS   Final Result      Moderate compression deformity of T11 is new compared to 2018.      Mild wedge deformity of T12 is unchanged.      Degenerative changes including facet arthropathy.      Mild retrolisthesis of L5 on S1 and L3 on L4.               Problem Representation: 56 year old male with a history of  nonischemic cardiomyopathy with normal coronary angiography (2018), LVEF improved to 60% Feb 2020,  paroxysmal atrial fibrillation (CHADSVASC 3: CHF, HTN, DM), type 2 diabetes, hyperlipidemia, hypertension, previously on oral anticoagulation with Xarelto, medical noncompliance, BMI 38.57, history of orthostatic hypotension/syncope despite midodrine, Florinef, pyridostigmine, oral anticoagulation discontinued 2019 secondary to frequent  falls/syncope (extensive evaluation with cardiology and neurology in the past), recurrent MDD with psychotic features with suicidal ideation and attempt 1.5 years ago cutting himself, presented with recurrent syncope, fall and orthostatic hypotension as well as suicidal ideation on a legal hold.      * Orthostatic hypotension- (present on admission)  Assessment & Plan  Multifactorial, but suspect mostly contributed due to autonomic dysfunction from diabetes. Other contributing factors are due to chronic venous insufficiency,medications (lasix, BB, CCB). Does not appear dehydrated. Echocardiogram shows improved EF 60% with no concerning valvular dysfunction. No history of seizures. Not hypoglycemic. Cosyntropin test with cortisol >18 which rules out adrenal insufficiency.patient ambulated today and was not orthostatic, but does have some dyspnea on exertion secondary Afib, HFrEF and nonischemic cardiomyopathy.not requiring supplemental oxygen.  -hold BB, CCB, Flomax  -continue telemetry  -Per Cardiology, no further cardiac work-up  -daily orthostatic BP  -add B12 given impaired proprioception. TSH wnl Feb 2021  -recommended by Cardiology to increase Midodrine to 7.5mg BID  -medically cleared        Syncope  Assessment & Plan  See orthostatic hypotension section. Does have a history of frequent falls in the past several months.    Paroxysmal atrial fibrillation (HCC)  Assessment & Plan  CHADSVASC 3 (HTN, CHF, DM). Rate controlled. TTE Feb 2021: EF 60%,   -hold BB, CCB at this time due to bradycardia on admission and hypotension  -Cards restarted digoxin  -continue telemetry  -goal HR <110 at rest  -keep Mg>2, K>4  -previously on Xarelto and stopped 2019 due to fall risk; however, according to guidelines if has not had more than 300 falls in the past 12 months, benefit outweighs the risk to continue with chronic anticoagulation. Will reassess tomorrow when to restart Xarelto vs. Apixiban.    Suicidal  ideation  Assessment & Plan  Has history of MDD, recurrent with psychotic features with chronic SI and past SA.  -seen by  today:  -Legal Hold continued  -Patient can be transitioned to a Tele-sitter if available. If Tele siter is put in place,  the one on one sitter can be discontinued.  -Medication evaluation recommended  -Patient may have access to TV remote  -Follow all other Legal Hold protocols  -Transfer to an inpatient  psychiatric facility when patient is medically cleared.    -discussed case with Dr. Easley, who stated to discuss with Dr. Berger tomorrow.  -Dr. Berger is off today, but will be here tomorrow. Will reassess if can be transferred to an inpatient psychiatric facility since medically cleared.        Chronic HFrEF (heart failure with reduced ejection fraction) ([facility])  Assessment & Plan  EF 35% on Echo 2020, improved on TTE Feb 2021 to EF 60%. BNP >4k->downtrending.   -continue Lasix as prescribed  -hold Lisinopril and Toprol XL at this time; may restart upon discharge  -strict I/0's      Diabetes mellitus type 2, insulin dependent ([facility])  Assessment & Plan  Controlled given co-morbidities. A1c 7.8% Nov 2020, repeat 5.2% Feb 2021.  -continue home Lantus 15U qhs, but consider decreasing to 12U prior to discharge given well controlled.  -ISS  -hypoglycemic protocol  -diabetic diet  -diabetes education        Major depressive disorder, recurrent, severe with psychotic features (HCC)  Assessment & Plan  -Psychiatry and Psychology following.  -discussed with Dr. Berger today, who does not recommend any antipsychotics and only Modafinil which she states is treating his depression. No other medications recommended at this time. Will reevaluate tomorrow with Psychiatry  -appreciate Psychiatry and Psychology recommendations    Hypothyroidism- (present on admission)  Assessment & Plan  TSH wnl Feb 2021  -continue home Levothyroxine    Essential hypertension- (present on admission)  Assessment  & Plan  Monitor vitals.      Lower limb amputation, great toe (HCC)- (present on admission)  Assessment & Plan  s/p right great toe amputation 2018    Elevated troponin  Assessment & Plan  Stable. Suspect due to demand ischemia. No need to trend further.    Debility- (present on admission)  Assessment & Plan  -PT/OT consults: recommend Home health PT. Ordered 4 wheeled walker with seat  -needs SNF, referral placed  -patient to ambulate three times daily out of bed on floor with assistance and out of bed to chair for meals.    Chronic venous insufficiency of lower extremity  Assessment & Plan  -elevated legs  -compression stockings  -lotion, soap/water to legs  -weight loss (BMI 38)    Colostomy present (HCC)- (present on admission)  Assessment & Plan  History of fall at home Nov 2020 on left side with perforated colon and splenic fluid collection/hematoma s/p segmental colectomy, colostomy, mobilization of the splenic flexure, wound vac, and I&D Nov 2020.   -needs follow up arranged with surgery prior to discharge  -home health ordered placed for skilled nursing for colostomy bag    Nonischemic cardiomyopathy (HCC)- (present on admission)  Assessment & Plan  Normal coronaries, 2018.Tachycardia mediated  -LVEF improved, based on echo 2/10/2021 LVEF 60%    Mixed hyperlipidemia- (present on admission)  Assessment & Plan  LDL 82 Feb 2021  -repeat lipid panel  -continue Atorvatatin    Obesity- (present on admission)  Assessment & Plan  -recommend weight loss, exercise once medically stable  -nutrition consult

## 2021-02-15 NOTE — PROGRESS NOTES
Assumed care of patient at 1915.  Bedside report received from previous shift RN.  Reviewed POC with pt; no questions at this time.  Bed locked and lowered, call light and belongings within reach.

## 2021-02-15 NOTE — PROGRESS NOTES
Attention order for walker. Chatted with pt's nurse to let her know we do not carry walkers with the 4 wheels and a seat. Pt's nurse stated she will follow up with the pt's case manger to seek other options thru another DME vendor.

## 2021-02-15 NOTE — PROGRESS NOTES
Bedside report received from Tejal TOLENTINO RN. Pt A&Ox4. 1:1 sitter at bedside. POC discussed with pt. Pt verbalizes understanding. Bed locked and in lowest position. Alarm and fall precautions in place.

## 2021-02-15 NOTE — THERAPY
"Physical Therapy   Daily Treatment     Patient Name: Sebastián Castro  Age:  56 y.o., Sex:  male  Medical Record #: [id number]  Today's Date: 2/15/2021     Precautions: Fall Risk    Assessment    Pt progressing well w/ therapy. Pt able to tolerate increased functional mobility prior to symptoms of dizziness occurred. Pt does demonstrate improved insight into his dizziness and will state when he needs a break. Discussion w/ pt about 4WW so he can sit at times of dizziness w/ pt in agreement. Pt most likely close to his physical baseline. His limiting factors appear to be more medical in nature.    Plan    Continue current treatment plan.    DC Equipment Recommendations: 4-Wheeled Walker  Discharge Recommendations: Recommend home health for continued physical therapy services      Subjective    \"I have been working myself out.\"     Objective       02/15/21 0802   Precautions   Precautions Fall Risk   Comments Orthostatic Hypotension   Gait Analysis   Gait Level Of Assist Supervised  (Close guarding d/t BP)   Assistive Device Front Wheel Walker   Distance (Feet) 150   # of Times Distance was Traveled 1   Deviation Bradykinetic;Shuffled Gait;Other (Comment)  (Forward flexed)   Bed Mobility    Comments NT up in chair pre/post. Per pt and sitter able to get OOB w/out assist.   Functional Mobility   Sit to Stand Supervised   Bed, Chair, Wheelchair Transfer Supervised   Transfer Method Other (Comments)  (Ambulating)   Mobility With FWW   Short Term Goals    Short Term Goal # 1 Pt will perform functional transfers with SPV in 6 visits to increase independence   Goal Outcome # 1 Progressing as expected   Short Term Goal # 2 Pt will ambulae 100ft with FWW and SPV in 6 visits to increase independence   Goal Outcome # 2 Progressing as expected   Short Term Goal # 3 Pt will ascend/descend 4 steps with SPV in 6 visits to enter/exit home   Goal Outcome # 3 Goal not met         "

## 2021-02-15 NOTE — PROGRESS NOTES
[facility] Cardiology Follow-up Note    Date of Service:    2/15/2021      Name:   Sebastián Castro     YOB: 1965  Age:   56 y.o.  male   MRN:   [id number]      Chief Complaint: syncope    Primary Cardiologist:  Dr. Sanchez    HPI:  Mr Castro is a 55 y/o fellow with PMH including paroxysmal AFIB, Diabetes mellitus type II, HLD, hx of NICMO - now with normal EF, Essential hypertension with orthostatic hypotension who presented on 2/9/2021 with syncope after standing and persistent dizziness.    Interim Events:  Patient states he is feeling fine today   Had been up to the chair in the past 24 hours, did well .  Recheck orthostatic BP this AM positive when standing.    Orthostatic BP:  Laying 156/107  Sitting 153/106  Standing 116/89    ROS  Constitutional:  + fatigue.  Respiratory:  Denies shortness of breath, no cough.  Cardiovascular:  No chest pain.  no lower extremity edema.  Denies orthopnea or PND.  : denies polyuria, no dysuria.  GI:  Denies nausea/vomiting.  No abdominal distention.  Neuro:  Denies dizziness, syncope.  Hem/lymph: Denies easy bleeding/bruising.      All other review of systems reviewed and negative.    Past medical, surgical, social, and family history reviewed and unchanged from admission except as noted in HPI.    Medications: Reviewed in MAR  Current Facility-Administered Medications   Medication Dose Frequency Provider Last Rate Last Admin   • midodrine (PROAMATINE) tablet 7.5 mg  7.5 mg BID Jeremías Jefferson Jr., M.D.   Stopped at 02/15/21 0806   • digoxin (LANOXIN) tablet 125 mcg  125 mcg DAILY AT 1800 Romana Russell M.D.   125 mcg at 02/14/21 1715   • zolpidem (AMBIEN) tablet 2.5 mg  2.5 mg HS PRN Parveen Ford M.D.   2.5 mg at 02/14/21 2048   • furosemide (LASIX) tablet 20 mg  20 mg Q DAY Wicho Colon D.O.   20 mg at 02/15/21 0524   • enoxaparin (LOVENOX) inj 120 mg  1 mg/kg Q12HRS Romana Russell M.D.   120 mg at 02/15/21 0524   • nystatin (MYCOSTATIN) powder    BID Wicho Colon D.O.   Given at 02/15/21 0524   • senna-docusate (PERICOLACE or SENOKOT S) 8.6-50 MG per tablet 2 Tab  2 tablet BID Carmel Phillips M.D.   Stopped at 02/13/21 0600    And   • polyethylene glycol/lytes (MIRALAX) PACKET 1 Packet  1 Packet QDAY PRN Carmel Phillips M.D.        And   • magnesium hydroxide (MILK OF MAGNESIA) suspension 30 mL  30 mL QDAY PRN Carmel Phillips M.D.        And   • bisacodyl (DULCOLAX) suppository 10 mg  10 mg QDAY PRN Carmel Phillips M.D.       • ondansetron (ZOFRAN) syringe/vial injection 4 mg  4 mg Q4HRS PRN Carmel Phillips M.D.       • ondansetron (ZOFRAN ODT) dispertab 4 mg  4 mg Q4HRS PRN Carmel Phillips M.D.       • promethazine (PHENERGAN) tablet 12.5-25 mg  12.5-25 mg Q4HRS PRN Carmel Phillips M.D.       • promethazine (PHENERGAN) suppository 12.5-25 mg  12.5-25 mg Q4HRS PRBENI Phillips M.D.       • prochlorperazine (COMPAZINE) injection 5-10 mg  5-10 mg Q4HRS PRN Carmel Phillips M.D.       • atorvastatin (LIPITOR) tablet 40 mg  40 mg Q EVENING Carmel Phillips M.D.   40 mg at 02/14/21 1715   • insulin glargine (Lantus) injection  15 Units Q EVENING Carmel Phillips M.D.   15 Units at 02/14/21 1716   • levothyroxine (SYNTHROID) tablet 112 mcg  112 mcg AM ES Carmel Phillips M.D.   112 mcg at 02/15/21 0524   • modafinil (PROVIGIL) tablet 100 mg  100 mg QAM Carmel Phillips M.D.   100 mg at 02/15/21 0655   • insulin regular (HumuLIN R,NovoLIN R) injection  2-9 Units 4X/DAY VESTA Phillips M.D.   Stopped at 02/14/21 0700    And   • glucose 4 g chewable tablet 16 g  16 g Q15 MIN PRN Carmel Phillips M.D.        And   • dextrose 50% (D50W) injection 50 mL  50 mL Q15 MIN PRN Carmel Phillips M.D.       Last reviewed on 2/9/2021  9:53 AM by James Bliss    Allergies   Allergen Reactions   • Daptomycin Rash     Body rash  RXN=1/2018     • Pcn [Penicillins] Hives and Rash      Rash & hives  RXN=since a kid  Tolerates cephalosporins    • Unasyn [Ampicillin-Sulbactam Sodium] Hives, Itching and Swelling   • Vancomycin Rash     Red man syndrome. Tolerates IV vancomycin at reduced infusion rate.       Physical Exam  Body mass index is 37.79 kg/m². BP (!) 172/93   Pulse 64   Temp 35.9 °C (96.7 °F) (Temporal)   Resp 16   Ht 1.829 m (6')   Wt (!) 126 kg (278 lb 10.6 oz)   SpO2 94%    Vitals:    02/15/21 0021 02/15/21 0435 02/15/21 0500 02/15/21 0731   BP: 148/74 (!) 167/88 157/84 (!) 172/93   Pulse: (!) 55 61  64   Resp: 16 16  16   Temp: 36.2 °C (97.1 °F) 36.1 °C (97 °F)  35.9 °C (96.7 °F)   TempSrc: Temporal Temporal  Temporal   SpO2: 95% 94%     Weight:       Height:        Oxygen Therapy:  Pulse Oximetry: 94 %, O2 (LPM): 0, O2 Delivery Device: None - Room Air    General: no apparent distress  Neck: no JVD   Lungs: normal effort, without crackles, no wheezing or rhonchi  Heart: normal rate, IRregular rhythm, no murmur, no rub  EXT: puffy without lower extremity edema  Abdomen: soft, non tender, non distended  Neurological: No focal deficits, no facial asymmetry.  Normal speech  Psychiatric: Appropriate affect, alert and oriented x 3  Skin: Warm extremities, no rash    Labs (personally reviewed):     Lab Results   Component Value Date/Time    SODIUM 138 02/13/2021 05:37 AM    POTASSIUM 3.8 02/13/2021 05:37 AM    CHLORIDE 99 02/13/2021 05:37 AM    CO2 24 02/13/2021 05:37 AM    GLUCOSE 100 (H) 02/13/2021 05:37 AM    BUN 28 (H) 02/13/2021 05:37 AM    CREATININE 1.07 02/13/2021 05:37 AM     Lab Results   Component Value Date/Time    ALKPHOSPHAT 108 (H) 02/09/2021 08:10 AM    ASTSGOT 33 02/09/2021 08:10 AM    ALTSGPT 40 02/09/2021 08:10 AM    TBILIRUBIN 0.5 02/09/2021 08:10 AM      Lab Results   Component Value Date/Time    CHOLSTRLTOT 146 02/14/2021 01:30 AM    LDL 82 02/14/2021 01:30 AM    HDL 32 (A) 02/14/2021 01:30 AM    TRIGLYCERIDE 160 (H) 02/14/2021 01:30 AM         Cardiac Imaging and Procedures Review:      Personal Telemetry Review:  TRIPIB  70s-80s    Echo 2/10/21:  CONCLUSIONS  Compared to the images of the study done on 11/11/2020 - there has been   normalization of ejection fraction previously 35%.  Normal left ventricular systolic function.  Left ventricular ejection fraction is visually estimated to be 60%.  Diastolic function is difficult to assess with atrial fibrillation.  Normal right ventricular systolic function.  Mild  mitral regurgitation.    Stress Test 1/29/18:  Baseline ECG:atrial fibrillation  Stress ECG: No ischemic T wave changes with peak stress  Impression: Normal stress ECG, see nuclear images        [facility] 7/21/18:  FINDINGS:  1.  Hemodynamics:  LV systolic pressure was 99 mmHg with LV end-diastolic   pressure of 11 mmHg.  There was no gradient across the aortic valve.     2.  Left ventriculography showed global hypokinesis.  Patient has significant   run of ventricular tachycardia during left ventriculography.  Post-PVC   ejection fraction was probably about 45%.     3.  No significant coronary artery disease.     This is a left dominant system.     The left main is rather short, almost a separate ostium.  There is no   significant disease in the left main.  The left main bifurcated into left   anterior descending artery and left circumflex artery.     The left anterior descending artery is the large caliber vessel.  It extends   to around the apex.  It gives rises to a one medium sized bifurcating diagonal   branch in its proximal segment.  There is no significant disease noted in the   left anterior descending or its major branches.     The left circumflex artery is large, dominant system. It gives rise to multiple   obtuse marginal branches and large posterior descending artery.  There is no   significant disease noted in the left circumflex artery or its major branches.     The right coronary is a nondominant system.  It is medium in size and has   about 30-40% ostial stenosis, but it could be from the catheter-induced  spasm.  Antigrade flow is normal.     PLAN:  Resume anticoagulation for his atrial fibrillation.  Medical therapy   for LV dysfunction.     18 [facility] 2018:  Post-operative Diagnosis:   1. Normal right and left heart catheterization with left ventriculography and respiratory variation  2. No significant coronary disease      Assessment and Medical Decision Makin  Syncope  -   Recurrent, likely secondary to #2  -   Has been seen by neuro and EP in the recent past    2  Orthostatic hypotension  -   Has been on pyridostigmine, florinef and midodrine in the past.   -   Orthostatics still + this AM, however, no symptoms, no syncope.  -   Continue midodrine at 7.5 BID    3  Essential hypertension   -   Permissive in the setting of #1, 2    4  Persistent AFIB with rate control  -   Was seen by Dr. Black in the past, amiarturo d/c as was ineffective for rhythm control  -   [id number] 2 (htn, hx of chf) was on OAC, but had been held in the past due to frequent falls.  If we cannot improve his risk for syncope, with holding OAC may still be reasonable.           Sarah Johnson PA-C  [facility]

## 2021-02-15 NOTE — DISCHARGE PLANNING
Anticipated Discharge Disposition: TBD    Action: Patient on a legal hold. Not medically clear. PT/OT recommending HH.    Barriers to Discharge: legal hold    Plan: LSW to follow for medical clearance

## 2021-02-15 NOTE — FACE TO FACE
Face to Face Note  -  Durable Medical Equipment    Jeremías Jefferson Jr., M.D. - NPI: [id number]  I certify that this patient is under my care and that they had a durable medical equipment(DME)face to face encounter by myself that meets the physician DME face-to-face encounter requirements with this patient on:    Date of encounter:   Patient:                    MRN:                       YOB: 2023  Sebastián Castro  [id number]  1965     The encounter with the patient was in whole, or in part, for the following medical condition, which is the primary reason for durable medical equipment:  Other - syncope    I certify that, based on my findings, the following durable medical equipment is medically necessary:  Other DME Equipment - four wheeled walker with seat.    HOME O2 Saturation Measurements:(Values must be present for Home Oxygen orders)         ,     ,         My Clinical findings support the need for the above equipment due to:  Frequent Falls    Supporting Symptoms: none    If patient feels more short of breath, they can go up to 6 liters per minute and contact healthcare provider.

## 2021-02-15 NOTE — CONSULTS
"Brief Behavioral Health Note:    Length of Visit-30 minutes    HPI:  Sebastián is a 56 year old man admitted with recurrent syncope, falling and orthostatic hypotension and suicidal ideations.      Patient was seen seated in hospital chair, coloring. Alert, speech coherent, some stuttering noted, legs and hand tremors also noted. Patient continues to endorse suicidal ideations with the plan to cut his wrists. Patient reports a history of depression and suicidal ideations with one previous attempt in 2020. Patient reports cutting his wrist requiring sutures.  Patient denies any previous inpatient psychiatric hospitalization .    Patient also endorses both visual and auditory hallucinations. Patient was unable to describe the visual hallucinations, but reports the auditory hallucination are in the voice of a man. He states they are often command voices stating, \". You would be better off dead\". Patient continues, \"When they came to walk me today, I felt a little dizzy and I heard the voice say,  \"I told you, you couldn't do it!\"  These voices are distressing to patient per his report, and worsens his feelings of depression.    Social Supports  Patient currently resides at the [facility] [place] and receives therapy and other supportive services. Patient has contact with one brother, but reports his mother and son are no longer living,. Patient reports grief related to their passing.    Summary:  Sebastián continues to endorse suicidal ideations with a plan to cut his wrists. Intent remains unclear at this time. Patient reports depression and concern that his ability to walk will be permanently limited which causes consistent depression. Patient also may be experiencing grief after the recent loss of a son due to Covid per patient report.     Recommendations:  1. Legal Hold continued  2. Patient can be transitioned to a Tele-sitter if available. If Tele siter is put in place,  the one on one sitter can be discontinued.  3. " Medication evaluation recommended  4.Patient may have access to TV remote  5. Follow all other Legal Hold protocols  6. Transfer to an inpatient  psychiatric facility when patient is medically cleared.    Thank you    Susan Ewing, Ph.D., [facility]

## 2021-02-15 NOTE — ASSESSMENT & PLAN NOTE
Resolved  Likely secondary to orthostatic hypotension  Echo normal with 60% ejection fraction, improved from prior

## 2021-02-15 NOTE — CARE PLAN
Problem: Safety  Goal: Will remain free from falls  Outcome: PROGRESSING AS EXPECTED  Intervention: Implement fall precautions  Flowsheets  Taken 2/15/2021 1017 by Doris Martin R.N.  Bedrails: Bedrails Closest to Bathroom Down  Chair/Bed Strip Alarm: Yes - Alarm On  Taken 2/15/2021 0800 by Doris Martin R.N.  Environmental Precautions:   Treaded Slipper Socks on Patient   Personal Belongings, Wastebasket, Call Bell etc. in Easy Reach   Transferred to Stronger Side   Report Given to Other Health Care Providers Regarding Fall Risk   Bed in Low Position   Communication Sign for Patients & Families   Mobility Assessed & Appropriate Sign Placed  Taken 2/13/2021 2229 by Andreea Lazo R.N.  Bed Alarm: Yes - Alarm On  Note: 1:1 sitter at bedside.        Problem: Infection  Goal: Will remain free from infection  Outcome: PROGRESSING AS EXPECTED  Intervention: Implement standard precautions and perform hand washing before and after patient contact  Note: Patient educated on the importance of hand hygiene and wearing mask in the hallway to prevent infections, verbalized understanding.      Problem: Skin Integrity  Goal: Risk for impaired skin integrity will decrease  Outcome: PROGRESSING AS EXPECTED  Intervention: Implement precautions to protect skin integrity in collaboration with the interdisciplinary team  Flowsheets (Taken 2/15/2021 0800)  Skin Preventative Measures:   Pillows in Use for Support / Positioning   Pillows in Use to Float Heels  Bed Types: Pressure Redistribution Mattress (Atmosair)  Friction Interventions: Draw Sheet / Pad Used for Repositioning  Patient Turns / Repositioning: Patient Turns Self from Side to Side  PT / OT Involved in Care:   Physical Therapy Involved   Occupational Therapy Involved  Moisturizers/Barriers:   Moisturizer   Barrier Cream  Patient is Receiving Nutrition: Oral Intake Adequate  Vitamin Therapy in Use: No  Activity:   Ambulated   Bed   Chair   Edge of  bed   Up to bathroom  Assistance / Tolerance for Turning/Repositioning: Tolerates Well

## 2021-02-15 NOTE — DISCHARGE PLANNING
Received Choice form at 1735  Agency/Facility Name: [facility]   Referral sent per Choice form @ 2160

## 2021-02-16 PROBLEM — R07.9 CHEST PAIN: Status: ACTIVE | Noted: 2021-02-16

## 2021-02-16 LAB
ALBUMIN SERPL BCP-MCNC: 3.6 G/DL (ref 3.2–4.9)
ALBUMIN/GLOB SERPL: 1 G/DL
ALP SERPL-CCNC: 90 U/L (ref 30–99)
ALT SERPL-CCNC: 33 U/L (ref 2–50)
ANION GAP SERPL CALC-SCNC: 10 MMOL/L (ref 7–16)
AST SERPL-CCNC: 28 U/L (ref 12–45)
BASOPHILS # BLD AUTO: 0.6 % (ref 0–1.8)
BASOPHILS # BLD: 0.07 K/UL (ref 0–0.12)
BILIRUB SERPL-MCNC: 0.4 MG/DL (ref 0.1–1.5)
BUN SERPL-MCNC: 28 MG/DL (ref 8–22)
CALCIUM SERPL-MCNC: 9.8 MG/DL (ref 8.5–10.5)
CHLORIDE SERPL-SCNC: 98 MMOL/L (ref 96–112)
CO2 SERPL-SCNC: 25 MMOL/L (ref 20–33)
CREAT SERPL-MCNC: 1.15 MG/DL (ref 0.5–1.4)
EKG IMPRESSION: NORMAL
EKG IMPRESSION: NORMAL
EOSINOPHIL # BLD AUTO: 0.45 K/UL (ref 0–0.51)
EOSINOPHIL NFR BLD: 4.1 % (ref 0–6.9)
ERYTHROCYTE [DISTWIDTH] IN BLOOD BY AUTOMATED COUNT: 44.7 FL (ref 35.9–50)
GLOBULIN SER CALC-MCNC: 3.5 G/DL (ref 1.9–3.5)
GLUCOSE BLD-MCNC: 103 MG/DL (ref 65–99)
GLUCOSE BLD-MCNC: 120 MG/DL (ref 65–99)
GLUCOSE BLD-MCNC: 133 MG/DL (ref 65–99)
GLUCOSE BLD-MCNC: 164 MG/DL (ref 65–99)
GLUCOSE SERPL-MCNC: 101 MG/DL (ref 65–99)
HCT VFR BLD AUTO: 46.3 % (ref 42–52)
HGB BLD-MCNC: 15.1 G/DL (ref 14–18)
IMM GRANULOCYTES # BLD AUTO: 0.05 K/UL (ref 0–0.11)
IMM GRANULOCYTES NFR BLD AUTO: 0.5 % (ref 0–0.9)
LYMPHOCYTES # BLD AUTO: 2.77 K/UL (ref 1–4.8)
LYMPHOCYTES NFR BLD: 25 % (ref 22–41)
MAGNESIUM SERPL-MCNC: 1.8 MG/DL (ref 1.5–2.5)
MCH RBC QN AUTO: 28.5 PG (ref 27–33)
MCHC RBC AUTO-ENTMCNC: 32.6 G/DL (ref 33.7–35.3)
MCV RBC AUTO: 87.5 FL (ref 81.4–97.8)
MONOCYTES # BLD AUTO: 0.92 K/UL (ref 0–0.85)
MONOCYTES NFR BLD AUTO: 8.3 % (ref 0–13.4)
NEUTROPHILS # BLD AUTO: 6.81 K/UL (ref 1.82–7.42)
NEUTROPHILS NFR BLD: 61.5 % (ref 44–72)
NRBC # BLD AUTO: 0 K/UL
NRBC BLD-RTO: 0 /100 WBC
PLATELET # BLD AUTO: 243 K/UL (ref 164–446)
PMV BLD AUTO: 10.8 FL (ref 9–12.9)
POTASSIUM SERPL-SCNC: 3.9 MMOL/L (ref 3.6–5.5)
PROT SERPL-MCNC: 7.1 G/DL (ref 6–8.2)
RBC # BLD AUTO: 5.29 M/UL (ref 4.7–6.1)
SODIUM SERPL-SCNC: 133 MMOL/L (ref 135–145)
TROPONIN T SERPL-MCNC: 54 NG/L (ref 6–19)
WBC # BLD AUTO: 11.1 K/UL (ref 4.8–10.8)

## 2021-02-16 PROCEDURE — 302102 BAG OST N IMG 2.25IN 2PC (FECAL): Performed by: HOSPITALIST

## 2021-02-16 PROCEDURE — 700111 HCHG RX REV CODE 636 W/ 250 OVERRIDE (IP): Performed by: INTERNAL MEDICINE

## 2021-02-16 PROCEDURE — 93005 ELECTROCARDIOGRAM TRACING: CPT | Performed by: HOSPITALIST

## 2021-02-16 PROCEDURE — A9270 NON-COVERED ITEM OR SERVICE: HCPCS | Performed by: PHYSICIAN ASSISTANT

## 2021-02-16 PROCEDURE — 700102 HCHG RX REV CODE 250 W/ 637 OVERRIDE(OP): Performed by: STUDENT IN AN ORGANIZED HEALTH CARE EDUCATION/TRAINING PROGRAM

## 2021-02-16 PROCEDURE — 700102 HCHG RX REV CODE 250 W/ 637 OVERRIDE(OP): Performed by: GENERAL PRACTICE

## 2021-02-16 PROCEDURE — A9270 NON-COVERED ITEM OR SERVICE: HCPCS | Performed by: HOSPITALIST

## 2021-02-16 PROCEDURE — 82962 GLUCOSE BLOOD TEST: CPT | Mod: 91

## 2021-02-16 PROCEDURE — 99233 SBSQ HOSP IP/OBS HIGH 50: CPT | Performed by: PSYCHIATRY & NEUROLOGY

## 2021-02-16 PROCEDURE — 700102 HCHG RX REV CODE 250 W/ 637 OVERRIDE(OP): Performed by: INTERNAL MEDICINE

## 2021-02-16 PROCEDURE — 84484 ASSAY OF TROPONIN QUANT: CPT

## 2021-02-16 PROCEDURE — 85025 COMPLETE CBC W/AUTO DIFF WBC: CPT

## 2021-02-16 PROCEDURE — A9270 NON-COVERED ITEM OR SERVICE: HCPCS | Performed by: STUDENT IN AN ORGANIZED HEALTH CARE EDUCATION/TRAINING PROGRAM

## 2021-02-16 PROCEDURE — A9270 NON-COVERED ITEM OR SERVICE: HCPCS | Performed by: PSYCHIATRY & NEUROLOGY

## 2021-02-16 PROCEDURE — 93010 ELECTROCARDIOGRAM REPORT: CPT | Mod: 76 | Performed by: INTERNAL MEDICINE

## 2021-02-16 PROCEDURE — 302098 PASTE RING (FLAT): Performed by: HOSPITALIST

## 2021-02-16 PROCEDURE — A9270 NON-COVERED ITEM OR SERVICE: HCPCS | Performed by: INTERNAL MEDICINE

## 2021-02-16 PROCEDURE — 83735 ASSAY OF MAGNESIUM: CPT

## 2021-02-16 PROCEDURE — 770001 HCHG ROOM/CARE - MED/SURG/GYN PRIV*

## 2021-02-16 PROCEDURE — 700102 HCHG RX REV CODE 250 W/ 637 OVERRIDE(OP): Performed by: PSYCHIATRY & NEUROLOGY

## 2021-02-16 PROCEDURE — 93005 ELECTROCARDIOGRAM TRACING: CPT | Performed by: GENERAL PRACTICE

## 2021-02-16 PROCEDURE — 80053 COMPREHEN METABOLIC PANEL: CPT

## 2021-02-16 PROCEDURE — 700102 HCHG RX REV CODE 250 W/ 637 OVERRIDE(OP): Performed by: HOSPITALIST

## 2021-02-16 PROCEDURE — 93010 ELECTROCARDIOGRAM REPORT: CPT | Performed by: INTERNAL MEDICINE

## 2021-02-16 PROCEDURE — 700111 HCHG RX REV CODE 636 W/ 250 OVERRIDE (IP): Performed by: GENERAL PRACTICE

## 2021-02-16 PROCEDURE — 99232 SBSQ HOSP IP/OBS MODERATE 35: CPT | Mod: GC | Performed by: HOSPITALIST

## 2021-02-16 PROCEDURE — 700102 HCHG RX REV CODE 250 W/ 637 OVERRIDE(OP): Performed by: PHYSICIAN ASSISTANT

## 2021-02-16 PROCEDURE — 99232 SBSQ HOSP IP/OBS MODERATE 35: CPT | Performed by: INTERNAL MEDICINE

## 2021-02-16 PROCEDURE — A9270 NON-COVERED ITEM OR SERVICE: HCPCS | Performed by: GENERAL PRACTICE

## 2021-02-16 PROCEDURE — 302111 WAFER OST 2.25IN N IMG RD 2 PC (BARRIER): Performed by: HOSPITALIST

## 2021-02-16 PROCEDURE — 36415 COLL VENOUS BLD VENIPUNCTURE: CPT

## 2021-02-16 RX ORDER — MAGNESIUM SULFATE HEPTAHYDRATE 40 MG/ML
2 INJECTION, SOLUTION INTRAVENOUS ONCE
Status: COMPLETED | OUTPATIENT
Start: 2021-02-16 | End: 2021-02-16

## 2021-02-16 RX ORDER — ACETAMINOPHEN 325 MG/1
650 TABLET ORAL EVERY 4 HOURS PRN
Status: DISCONTINUED | OUTPATIENT
Start: 2021-02-16 | End: 2021-07-09 | Stop reason: HOSPADM

## 2021-02-16 RX ORDER — FLUOXETINE HYDROCHLORIDE 20 MG/1
20 CAPSULE ORAL DAILY
Status: DISCONTINUED | OUTPATIENT
Start: 2021-02-16 | End: 2021-02-16

## 2021-02-16 RX ORDER — TRAZODONE HYDROCHLORIDE 50 MG/1
50 TABLET ORAL ONCE
Status: COMPLETED | OUTPATIENT
Start: 2021-02-16 | End: 2021-02-16

## 2021-02-16 RX ORDER — RISPERIDONE 0.5 MG/1
0.5 TABLET ORAL EVERY EVENING
Status: DISCONTINUED | OUTPATIENT
Start: 2021-02-16 | End: 2021-02-26

## 2021-02-16 RX ORDER — POTASSIUM CHLORIDE 20 MEQ/1
10 TABLET, EXTENDED RELEASE ORAL ONCE
Status: COMPLETED | OUTPATIENT
Start: 2021-02-16 | End: 2021-02-16

## 2021-02-16 RX ORDER — FLUOXETINE 10 MG/1
10 CAPSULE ORAL DAILY
Status: DISCONTINUED | OUTPATIENT
Start: 2021-02-16 | End: 2021-02-16

## 2021-02-16 RX ADMIN — DIGOXIN 125 MCG: 125 TABLET ORAL at 17:01

## 2021-02-16 RX ADMIN — ZOLPIDEM TARTRATE 2.5 MG: 5 TABLET ORAL at 20:24

## 2021-02-16 RX ADMIN — TRAZODONE HYDROCHLORIDE 50 MG: 50 TABLET ORAL at 22:44

## 2021-02-16 RX ADMIN — MIDODRINE HYDROCHLORIDE 5 MG: 5 TABLET ORAL at 17:01

## 2021-02-16 RX ADMIN — MAGNESIUM SULFATE 2 G: 2 INJECTION INTRAVENOUS at 08:16

## 2021-02-16 RX ADMIN — MIDODRINE HYDROCHLORIDE 5 MG: 5 TABLET ORAL at 14:08

## 2021-02-16 RX ADMIN — POTASSIUM CHLORIDE 10 MEQ: 1500 TABLET, EXTENDED RELEASE ORAL at 08:16

## 2021-02-16 RX ADMIN — INSULIN GLARGINE 15 UNITS: 100 INJECTION, SOLUTION SUBCUTANEOUS at 16:59

## 2021-02-16 RX ADMIN — ACETAMINOPHEN 650 MG: 325 TABLET, FILM COATED ORAL at 05:43

## 2021-02-16 RX ADMIN — NYSTATIN: 100000 POWDER TOPICAL at 05:30

## 2021-02-16 RX ADMIN — ATORVASTATIN CALCIUM 40 MG: 40 TABLET, FILM COATED ORAL at 17:01

## 2021-02-16 RX ADMIN — LEVOTHYROXINE SODIUM 112 MCG: 0.11 TABLET ORAL at 05:29

## 2021-02-16 RX ADMIN — INSULIN HUMAN 2 UNITS: 100 INJECTION, SOLUTION PARENTERAL at 16:58

## 2021-02-16 RX ADMIN — MODAFINIL 100 MG: 100 TABLET ORAL at 05:29

## 2021-02-16 RX ADMIN — DOCUSATE SODIUM 50 MG AND SENNOSIDES 8.6 MG 2 TABLET: 8.6; 5 TABLET, FILM COATED ORAL at 17:01

## 2021-02-16 RX ADMIN — MIDODRINE HYDROCHLORIDE 5 MG: 5 TABLET ORAL at 09:20

## 2021-02-16 RX ADMIN — MIDODRINE HYDROCHLORIDE 5 MG: 5 TABLET ORAL at 20:24

## 2021-02-16 RX ADMIN — NYSTATIN: 100000 POWDER TOPICAL at 17:13

## 2021-02-16 RX ADMIN — ENOXAPARIN SODIUM 120 MG: 120 INJECTION SUBCUTANEOUS at 05:29

## 2021-02-16 RX ADMIN — RISPERIDONE 0.5 MG: 0.5 TABLET ORAL at 18:40

## 2021-02-16 RX ADMIN — ENOXAPARIN SODIUM 120 MG: 120 INJECTION SUBCUTANEOUS at 17:01

## 2021-02-16 RX ADMIN — FLUOXETINE 20 MG: 20 CAPSULE ORAL at 17:01

## 2021-02-16 ASSESSMENT — PAIN DESCRIPTION - PAIN TYPE
TYPE: ACUTE PAIN

## 2021-02-16 ASSESSMENT — ENCOUNTER SYMPTOMS
BLURRED VISION: 1
MYALGIAS: 0
ABDOMINAL PAIN: 0
DEPRESSION: 1
WEAKNESS: 1
VOMITING: 0
SHORTNESS OF BREATH: 0
DIARRHEA: 0
INSOMNIA: 1
HEADACHES: 0
HALLUCINATIONS: 1
DIZZINESS: 0
NAUSEA: 1
CONSTIPATION: 0
SORE THROAT: 0

## 2021-02-16 ASSESSMENT — FIBROSIS 4 INDEX: FIB4 SCORE: 1.16

## 2021-02-16 NOTE — WOUND TEAM
"      [facility]   Inpatient Services   Established Ostomy Management/ troubleshooting  HPI: Reviewed  PMH: Reviewed   SH: Reviewed   Reason for Ostomy nurse consult:  Established colostomy    Subjective: ostomy appliance intact, skin improved, more budded then seen previously, patient states he has lost weight.      Colostomy 11/10/20 Transverse LUQ (Active)   Stomal Appliance Assessment Intact;Changed    Stoma Assessment Pink;Red    Stoma Shape Budded Less Than One Inch;Round    Stoma Size (in) 1.25    Peristomal Assessment Intact    Mucocutaneous Junction Intact    Treatment Appliance Changed;Cleansed with water/washcloth    Peristomal Protectant Paste Ring    Stomal Appliance Paste Ring, 2\";2 1/4\" (57mm) CTF    Output (mL) 50 mL    Output Color Brown    WOUND RN ONLY - Stomal Appliance  2 Piece;Paste Ring, 2\";2 1/4\" (57mm) CTF    Appliance Brand Daisy    WOUND NURSE ONLY - Time Spent with Patient (mins) 30      Ostomy Appliance (type and size): 2 1/4 2 piece with NC     Interventions and Education: removed current appliance. Cleansed stoma and peristomal area with moist wash cloth. Skin prep applied, cut barrier to fit. Applied paste ring to barrier, applied to patient. Attached bag to barrier. Closed end of pouch.     Evaluation: Patient with established colostomy, home health nurse was completing care.  Unclear DC plan at this time however skin is greatly improved with no redness or irritation noted.  Nursing to continue with changes every 3 days.         Plan: wound team to sign off, nursing to re-consult with any concerns.    Anticipated discharge needs: patient would benefit from SNF or Home health for follow up care.     "

## 2021-02-16 NOTE — PROGRESS NOTES
Pt requesting antidepressants and is upset that they have not been restarted. Spoke with Dr. Jefferson about medications, per Dr. Jefferson psych medications will be deferred to Psychiatry.     Message has been sent to psychiatry.

## 2021-02-16 NOTE — PROGRESS NOTES
Assumed care of patient at 1900.  Bedside report received from previous shift RN.  Reviewed POC with pt; no questions at this time.  Bed locked and lowered, call light and belongings within reach.

## 2021-02-16 NOTE — PROGRESS NOTES
[facility] Cardiology Follow-up Note    Date of Service:    2/16/2021      Name:   Sebastián Castro     YOB: 1965  Age:   56 y.o.  male   MRN:   [id number]      Chief Complaint: syncope    Primary Cardiologist:  Dr. Sanchez    HPI:  Mr Castro is a 55 y/o fellow with PMH including paroxysmal AFIB, Diabetes mellitus type II, HLD, hx of NICMO - now with normal EF, Essential hypertension with orthostatic hypotension who presented on 2/9/2021 with syncope after standing and persistent dizziness.    Interim Events:  Orthostatics positive this AM, but minimally symptomatic.   Having suicidial ideations, awaiting psych eval    Orthostatic BP:  Laying 142/86  Sitting 128/71  Standing 95/66    ROS  Constitutional:  + fatigue.  Respiratory:  Denies shortness of breath, no cough.  Cardiovascular:  No chest pain.  no lower extremity edema.  Denies orthopnea or PND.  : denies polyuria, no dysuria.  GI:  Denies nausea/vomiting.  No abdominal distention.  Neuro:  Denies dizziness, syncope.  Hem/lymph: Denies easy bleeding/bruising.      All other review of systems reviewed and negative.    Past medical, surgical, social, and family history reviewed and unchanged from admission except as noted in HPI.    Medications: Reviewed in MAR  Current Facility-Administered Medications   Medication Dose Frequency Provider Last Rate Last Admin   • acetaminophen (Tylenol) tablet 650 mg  650 mg Q4HRS PRN Shiv Garner M.D.   650 mg at 02/16/21 0543   • midodrine (PROAMATINE) tablet 5 mg  5 mg 4X/DAY Sarah Johnson P.AVane-C.   5 mg at 02/16/21 0920   • digoxin (LANOXIN) tablet 125 mcg  125 mcg DAILY AT 1800 Romana Russell M.D.   125 mcg at 02/15/21 1717   • zolpidem (AMBIEN) tablet 2.5 mg  2.5 mg HS PRN Parveen Ford M.D.   2.5 mg at 02/15/21 1939   • enoxaparin (LOVENOX) inj 120 mg  1 mg/kg Q12HRS Romana Russell M.D.   120 mg at 02/16/21 0529   • nystatin (MYCOSTATIN) powder   BID Wicho Colon D.O.   Given at 02/16/21 0530   •  senna-docusate (PERICOLACE or SENOKOT S) 8.6-50 MG per tablet 2 Tab  2 tablet BID Carmel Phillips M.D.   Stopped at 02/13/21 0600    And   • polyethylene glycol/lytes (MIRALAX) PACKET 1 Packet  1 Packet QDAY PRBENI Phillips M.D.        And   • magnesium hydroxide (MILK OF MAGNESIA) suspension 30 mL  30 mL QDAY PRN Carmel Phillips M.D.        And   • bisacodyl (DULCOLAX) suppository 10 mg  10 mg QDAY PRN Carmel Phillips M.D.       • ondansetron (ZOFRAN) syringe/vial injection 4 mg  4 mg Q4HRS PRBENI Phillips M.D.       • ondansetron (ZOFRAN ODT) dispertab 4 mg  4 mg Q4HRS PRBENI Phillips M.D.       • promethazine (PHENERGAN) tablet 12.5-25 mg  12.5-25 mg Q4HRS PRBENI Phillips M.D.       • promethazine (PHENERGAN) suppository 12.5-25 mg  12.5-25 mg Q4HRS PRBENI Phillips M.D.       • prochlorperazine (COMPAZINE) injection 5-10 mg  5-10 mg Q4HRS PRBENI Phillips M.D.       • atorvastatin (LIPITOR) tablet 40 mg  40 mg Q EVENING Carmel Phillips M.D.   40 mg at 02/15/21 1717   • insulin glargine (Lantus) injection  15 Units Q EVENING Carmel Phillips M.D.   15 Units at 02/15/21 1725   • levothyroxine (SYNTHROID) tablet 112 mcg  112 mcg AM ES Carmel Phillips M.D.   112 mcg at 02/16/21 0529   • modafinil (PROVIGIL) tablet 100 mg  100 mg QA Carmel Phillips M.D.   100 mg at 02/16/21 0529   • insulin regular (HumuLIN R,NovoLIN R) injection  2-9 Units 4X/DAY ACHAME Meeks.D.   Stopped at 02/14/21 0700    And   • glucose 4 g chewable tablet 16 g  16 g Q15 MIN PRN Carmel Phillips M.D.        And   • dextrose 50% (D50W) injection 50 mL  50 mL Q15 MIN PRN Carmel Phillips M.D.       Last reviewed on 2/9/2021  9:53 AM by James Bliss    Allergies   Allergen Reactions   • Daptomycin Rash     Body rash  RXN=1/2018     • Pcn [Penicillins] Hives and Rash      Rash & hives  RXN=since a kid  Tolerates cephalosporins   • Unasyn [Ampicillin-Sulbactam Sodium] Hives,  Itching and Swelling   • Vancomycin Rash     Red man syndrome. Tolerates IV vancomycin at reduced infusion rate.       Physical Exam  Body mass index is 39.47 kg/m². BP (!) 95/66   Pulse (!) 103   Temp 36.1 °C (97 °F) (Temporal)   Resp 18   Ht 1.829 m (6')   Wt (!) 132 kg (291 lb 0.1 oz)   SpO2 97%    Vitals:    02/16/21 0708 02/16/21 0900 02/16/21 1029 02/16/21 1036   BP: 149/88 142/86 128/71 (!) 95/66   Pulse: 72  76 (!) 103   Resp: 18  18 18   Temp:       TempSrc:       SpO2: 97%      Weight:       Height:        Oxygen Therapy:  Pulse Oximetry: 97 %, O2 (LPM): 0, O2 Delivery Device: None - Room Air    General: no apparent distress  Neck: no JVD   Lungs: normal effort, without crackles, no wheezing or rhonchi  Heart: normal rate, IRregular rhythm, no murmur, no rub  EXT: puffy without lower extremity edema  Abdomen: soft, non tender, non distended  Neurological: No focal deficits, no facial asymmetry.  Normal speech  Psychiatric: Appropriate affect, alert and oriented x 3, suicidal ideations  Skin: Warm extremities, no rash    Labs (personally reviewed):     Lab Results   Component Value Date/Time    SODIUM 133 (L) 02/16/2021 05:52 AM    POTASSIUM 3.9 02/16/2021 05:52 AM    CHLORIDE 98 02/16/2021 05:52 AM    CO2 25 02/16/2021 05:52 AM    GLUCOSE 101 (H) 02/16/2021 05:52 AM    BUN 28 (H) 02/16/2021 05:52 AM    CREATININE 1.15 02/16/2021 05:52 AM     Lab Results   Component Value Date/Time    ALKPHOSPHAT 90 02/16/2021 05:52 AM    ASTSGOT 28 02/16/2021 05:52 AM    ALTSGPT 33 02/16/2021 05:52 AM    TBILIRUBIN 0.4 02/16/2021 05:52 AM      Lab Results   Component Value Date/Time    CHOLSTRLTOT 146 02/14/2021 01:30 AM    LDL 82 02/14/2021 01:30 AM    HDL 32 (A) 02/14/2021 01:30 AM    TRIGLYCERIDE 160 (H) 02/14/2021 01:30 AM         Cardiac Imaging and Procedures Review:      Personal Telemetry Review:  AFIB 70s-80s    Echo 2/10/21:  CONCLUSIONS  Compared to the images of the study done on 11/11/2020 - there has  been   normalization of ejection fraction previously 35%.  Normal left ventricular systolic function.  Left ventricular ejection fraction is visually estimated to be 60%.  Diastolic function is difficult to assess with atrial fibrillation.  Normal right ventricular systolic function.  Mild  mitral regurgitation.    Stress Test 1/29/18:  Baseline ECG:atrial fibrillation  Stress ECG: No ischemic T wave changes with peak stress  Impression: Normal stress ECG, see nuclear images        [facility] 7/21/18:  FINDINGS:  1.  Hemodynamics:  LV systolic pressure was 99 mmHg with LV end-diastolic   pressure of 11 mmHg.  There was no gradient across the aortic valve.     2.  Left ventriculography showed global hypokinesis.  Patient has significant   run of ventricular tachycardia during left ventriculography.  Post-PVC   ejection fraction was probably about 45%.     3.  No significant coronary artery disease.     This is a left dominant system.     The left main is rather short, almost a separate ostium.  There is no   significant disease in the left main.  The left main bifurcated into left   anterior descending artery and left circumflex artery.     The left anterior descending artery is the large caliber vessel.  It extends   to around the apex.  It gives rises to a one medium sized bifurcating diagonal   branch in its proximal segment.  There is no significant disease noted in the   left anterior descending or its major branches.     The left circumflex artery is large, dominant system. It gives rise to multiple   obtuse marginal branches and large posterior descending artery.  There is no   significant disease noted in the left circumflex artery or its major branches.     The right coronary is a nondominant system.  It is medium in size and has   about 30-40% ostial stenosis, but it could be from the catheter-induced spasm.  Antigrade flow is normal.     PLAN:  Resume anticoagulation for his atrial fibrillation.  Medical therapy    for LV dysfunction.     18 [facility] 2018:  Post-operative Diagnosis:   1. Normal right and left heart catheterization with left ventriculography and respiratory variation  2. No significant coronary disease      Assessment and Medical Decision Makin  Syncope  -   Recurrent, likely secondary to #2  -   Has been seen by neuro and EP in the recent past    2  Orthostatic hypotension  -   Has been on pyridostigmine, florinef and midodrine in the past.   -   Orthostatics still + this AM, however, no symptoms, no syncope.  -   Continue midodrine 5 mg 4 times per day, may increase to 7.5 4 times per day if needed for symptomatic improvement.    3  Essential hypertension   -   Permissive in the setting of #1, 2    4  Persistent AFIB with rate control  -   Was seen by Dr. Black in the past, indira d/c as was ineffective for rhythm control  -   [id number] 2 (htn, hx of chf) was on OAC, but had been held in the past due to frequent falls.  If we cannot improve his risk for syncope, with holding OAC may still be reasonable.     5   suicidal ideation  -    Sitter at bedside  -    Psych eval pending.      Cardiology will sign off at this time.  Please contact our service directly with further questions/concerns.      Sarah Johnson PA-C  [facility]

## 2021-02-16 NOTE — DISCHARGE PLANNING
Agency/Facility Name: [facility]  Outcome: Received a message from [id number] on 2/15/21.  Declined.  They do not have PT in there office.      KALIA Gutierrez notified via Teams.

## 2021-02-16 NOTE — CARE PLAN
Problem: Communication  Goal: The ability to communicate needs accurately and effectively will improve  Outcome: PROGRESSING AS EXPECTED     Problem: Safety  Goal: Will remain free from injury  Outcome: PROGRESSING AS EXPECTED  Goal: Will remain free from falls  Outcome: PROGRESSING AS EXPECTED     Problem: Bowel/Gastric:  Goal: Will not experience complications related to bowel motility  Outcome: PROGRESSING AS EXPECTED     Problem: Mobility  Goal: Risk for activity intolerance will decrease  Outcome: PROGRESSING AS EXPECTED     Problem: Skin Integrity  Goal: Risk for impaired skin integrity will decrease  Outcome: PROGRESSING AS EXPECTED

## 2021-02-16 NOTE — DISCHARGE PLANNING
Received Choice form at 1216  [facility]   Referral sent per Choice form @ 1211

## 2021-02-16 NOTE — PROGRESS NOTES
Pt complaining of non-radiating sharp, squeezing chest pain 7/10 with nausea and SOB.  -90, HR 66, RR 16, O2 94 RA. Ordered STAT EKG and troponin lab.  Notified MD en route to bedside.

## 2021-02-16 NOTE — PROGRESS NOTES
Received call from overnight nurse. Pt reports new onset lower sternal chest pain that is squeezing in nature. Pt reports that he has never had this issue in the past. Has history of DM, afib, NICMO, but denies any other cardiac history or risk factors for CAD. Pt reports that the pain is worse with deep inspiration and with movement. Denies any trauma, falls, coughing, sneezing, or vomiting. Otherwise denies further complaints.     Vitals: /86, otherwise unremarkable.  Physical Exam   Constitutional: No distress.   HENT:   Head: Normocephalic.   Neck: No JVD present.   Cardiovascular: Normal rate.   No murmur heard.  Irregular rhythm   Pulmonary/Chest: Effort normal and breath sounds normal. No respiratory distress. He has no wheezes. He has no rales. He exhibits tenderness (Tenderness to palpation lower sternal region).   Abdominal: He exhibits no distension. There is no abdominal tenderness. There is no rebound.   Musculoskeletal:         General: Edema (mild b/l LE) present.   Neurological: He is alert.     Likely not cardiac in nature, possibly musculoskeletal. Recent echo and labs have been reviewed.  Will order:  - ekg  - trop, bnp, cbc, cmp  - tylenol 650mg q6h PRN  - will f/u with labs and monitor overnight

## 2021-02-16 NOTE — PROGRESS NOTES
Assumed care of patient at bedside report from NOC RN. Updated on POC. Patient currently A & O x 4; on room air; up in bed. Call light within reach. Whiteboard updated. Fall precautions in place. Bed locked and in lowest position.    One to one sitter in place.

## 2021-02-16 NOTE — DISCHARGE PLANNING
Anticipated Discharge Disposition: inpatient psych    Action: Patient was discussed in IDT rounds. Patient is medically clear. Medical clearance for inpatient psych has been documented in progress notes as original legal hold paperwork was filled out incorrectly.    Patient received 4 wheel walker at beside yesterday.     Barriers to Discharge: acceptance at psych facility    Plan: Send out psych referral

## 2021-02-16 NOTE — PROGRESS NOTES
"Daily Progress Note:     Date of Service: 2/16/2021  Primary Team: [facility] IM White Team   Attending: Jesús Harper M.D.   Senior Resident: Dr. Ley  Intern: Dr. Jefferson  Contact:  [phone]    ID: 56 year old male, admitted for recurrent syncope, fall, orthostatic hypotension and suicidal ideation.     Chief Complaint: \"passed out\"        Subjective:  Overnight, nursing staff reported the patient was complaining of non-radiating sharp, squeezing chest pain with nausea and SOB this morning.Hospitalist saw the patient. Troponin elevated at 54; however, the ECG afib w/o RVR,  Revealed no acute ischemic changes. BMP was unconcerning. Patient had reproducible pain on exam by the hospitalist  With baseline irregular rhythm. Given Tylenol 650mg once that decreased his pain from a 7->6/10. The patient reports he does have anxiety still. Pt. Reports associated shortness of breath during episode, but denied palpitations, lightheadedness, dizziness.  This morning the patient reports he still has SI with plan to cut himself and reports that if the 1:1 sitter was not here, he would kill himself. The patient also reports that he now hears 3-4 sinister, male voices telling him to kill himself and he would be \"better off dead\". Before, he was only hearing 1 voice. He does not know who these are. Pt reports nightmare still of a man chasing him with an axe. Currently, the patient denies SOB, N/V. PO tolerating, having BM, ambulating with improvement. No issues voiding.      Interval Update: Per Psychology that saw the patient today  1. Legal Hold continued  2. Patient can be transitioned to a Tele-sitter if available. If Tele siter is put in place,  the one on one sitter can be discontinued.  3. Medication evaluation recommended  4.Patient may have access to TV remote  5. Follow all other Legal Hold protocols  6. Transfer to an inpatient  psychiatric facility when patient is medically cleared.    MEDICAL. TRANSFER ORDERS " PLACED.            Consultants/Specialty:  Cardiology-intereventional (Dr. Fernandez)  Psychiatry (Dr. Berger)  Psychology (Dr. Fernandez)  Behavioral Health (therapist-Susan Ewing PhD, [facility])        Review of Systems:   Review of Systems   Constitutional:  Negative for chills, fever and weight loss.   Eyes: Negative.  Negative for blurred vision and double vision.   Respiratory: Negative.  Negative for cough and shortness of breath.    Cardiovascular: Negative.  Negative for chest pain, palpitations, orthopnea and leg swelling.   Gastrointestinal: Negative.  Negative for abdominal pain, constipation, diarrhea, heartburn, nausea and vomiting.   Musculoskeletal: Positive for falls. Negative for joint pain and myalgias.   Skin: Negative.  Negative for rash.   Neurological: no LOC, dizziness, lightheadedness Negative for tingling, tremors, seizures, weakness and headaches.   Psychiatric/Behavioral: Positive for depression, hallucinations and suicidal ideas. Negative for substance abuse. The patient has insomnia. The patient is not nervous/anxious    Objective Data:   Physical Exam:   Vitals:   Temp:  [35.8 °C (96.5 °F)-36.2 °C (97.2 °F)] 36.1 °C (97 °F)  Pulse:  [] 103  Resp:  [16-18] 18  BP: ()/(59-95) 95/66  SpO2:  [92 %-97 %] 97 %   Physical Exam   Physical Exam  Vitals and nursing note reviewed.   Constitutional:       General: He is not in acute distress.     Appearance: Normal appearance. He is obese. He is not ill-appearing or toxic-appearing.   HENT:      Head: Normocephalic and atraumatic.      Mouth/Throat:      Mouth: Mucous membranes are moist.   Eyes:      General:         Right eye: No discharge.         Left eye: No discharge.      Extraocular Movements: Extraocular movements intact.      Conjunctiva/sclera: Conjunctivae normal.      Pupils: Pupils are equal, round, and reactive to light.   Neck:      Comments: No JVD appreciated  Cardiovascular:      Rate and Rhythm: Normal rate and regular rhythm.       Pulses: Normal pulses.      Heart sounds: Normal heart sounds. No murmur. No friction rub. No gallop.    Pulmonary:      Effort: Pulmonary effort is normal. No respiratory distress.      Breath sounds: Normal breath sounds. No stridor. No wheezing, rhonchi or rales.   Abdominal:      General: Bowel sounds are normal. There is no distension.      Palpations: Abdomen is soft.      Tenderness: There is no abdominal tenderness. There is no guarding.      Comments: Surrounding colostomy bag LLQ: clean/dry/intact   Musculoskeletal:         General: No swelling, tenderness, deformity or signs of injury.      Cervical back: Neck supple.      Right lower leg: No edema.      Left lower leg: No edema.        Skin:     General: Skin is dry.      Capillary Refill: Capillary refill takes less than 2 seconds.   Neurological:      Mental Status: He is alert and oriented to person, place, and time.      Motor: No weakness.      Deep Tendon Reflexes: Reflexes normal.      Psychiatric:         Attention and Perception: Attention normal. He perceives visual hallucinations.         Mood and Affect: Mood is depressed.         Speech: Speech normal. Speech is not rapid and pressured or slurred.         Behavior: Behavior is cooperative.         Thought Content: Thought content includes suicidal ideation. Thought content does not include homicidal ideation. Thought content includes suicidal plan.         Cognition and Memory: Cognition normal.         Judgment: Judgment is inappropriate.        Labs:   Recent Labs     02/16/21  0552   WBC 11.1*   RBC 5.29   HEMOGLOBIN 15.1   HEMATOCRIT 46.3   MCV 87.5   MCH 28.5   RDW 44.7   PLATELETCT 243   MPV 10.8   NEUTSPOLYS 61.50   LYMPHOCYTES 25.00   MONOCYTES 8.30   EOSINOPHILS 4.10   BASOPHILS 0.60     Recent Labs     02/16/21  0552   SODIUM 133*   POTASSIUM 3.9   CHLORIDE 98   CO2 25   GLUCOSE 101*   BUN 28*       Troponin: 54      Imaging:   EC-ECHOCARDIOGRAM COMPLETE W/O CONT   Final  Result      DX-LUMBAR SPINE-2 OR 3 VIEWS   Final Result      Moderate compression deformity of T11 is new compared to 2018.      Mild wedge deformity of T12 is unchanged.      Degenerative changes including facet arthropathy.      Mild retrolisthesis of L5 on S1 and L3 on L4.               Problem Representation: 56 year old male with a history of  nonischemic cardiomyopathy with normal coronary angiography (2018), LVEF improved to 60% Feb 2020,  paroxysmal atrial fibrillation (CHADSVASC 3: CHF, HTN, DM), type 2 diabetes, hyperlipidemia, hypertension, previously on oral anticoagulation with Xarelto, medical noncompliance, BMI 38.57, history of orthostatic hypotension/syncope despite midodrine, Florinef, pyridostigmine, oral anticoagulation discontinued 2019 secondary to frequent falls/syncope (extensive evaluation with cardiology and neurology in the past), recurrent MDD with psychotic features with suicidal ideation and attempt 1.5 years ago cutting himself, presented with recurrent syncope, fall and orthostatic hypotension as well as suicidal ideation to cut his wrists and is on a legal hold.      * Suicidal ideation  Assessment & Plan  Has history of MDD, recurrent with psychotic features with active SI w/ plan.  -Psychiatry to see today, Dr. Berger  -Legal Hold continued by Behavioral Health 2/15/21  -patient is medical and transfer orders placed for medical floor.  -Transfer to an inpatient  psychiatric facility when patient is medically cleared.            Major depressive disorder, recurrent, severe with psychotic features (HCC)  Assessment & Plan  -Psychiatry and Psychology following.  --Psychiatry to see today, Dr. Berger. Patient would benefit from an SSRI given he meets criteria for MDD  -no prolongation of QT and Rispderdal stopped.  -appreciate Psychiatry recommendations  -patient medically cleared    Orthostatic hypotension- (present on admission)  Assessment & Plan  Multifactorial, but suspect  mostly contributed due to autonomic dysfunction from diabetes. Other contributing factors are due to chronic venous insufficiency,medications (lasix, BB, CCB). Does not appear dehydrated. Echocardiogram shows improved EF 60% with no concerning valvular dysfunction. No history of seizures. Not hypoglycemic. Cosyntropin test with cortisol >18 which rules out adrenal insufficiency.patient ambulated today and was not orthostatic, but does have some dyspnea on exertion secondary Afib, HFrEF and nonischemic cardiomyopathy.not requiring supplemental oxygen.  -hold BB, CCB, Flomax  -continue telemetry  -Per Cardiology, no further cardiac work-up  -daily orthostatic BP  -per Cardiology, Midodrine adjusted to 5mg four times daily  -medically cleared        Chest pain  Assessment & Plan  Resolved after evaluation by night team. Suspect due to musculoskeletal etiology as well as underlying mood disorder with increased anxious mood.ECG showed no ischemic changes, troponin elevation not suspected due to ACS, but due to HFrEF and Afib.      Syncope  Assessment & Plan  See orthostatic hypotension section. Does have a history of frequent falls in the past several months.    Paroxysmal atrial fibrillation (HCC)  Assessment & Plan  CHADSVASC 3 (HTN, CHF, DM). Rate controlled. TTE Feb 2021: EF 60%,   -hold BB, CCB at this time due to bradycardia on admission and hypotension  -Cards restarted digoxin  -continue telemetry  -goal HR <110 at rest  -keep Mg>2, K>4  -previously on Xarelto and stopped 2019 due to fall risk; however, according to guidelines if has not had more than 300 falls in the past 12 months, benefit outweighs the risk to continue with chronic anticoagulation. Will reassess tomorrow when to restart Xarelto vs. Apixiban.    Chronic HFrEF (heart failure with reduced ejection fraction) (HCC)  Assessment & Plan  EF 35% on Echo 2020, improved on TTE Feb 2021 to EF 60%. BNP >4k->downtrending.   -continue Lasix as  prescribed  -hold Lisinopril and Toprol XL at this time; may restart upon discharge  -strict I/0's      Diabetes mellitus type 2, insulin dependent (HCC)  Assessment & Plan  Controlled given co-morbidities. A1c 7.8% Nov 2020, repeat 5.2% Feb 2021.  -continue home Lantus 15U qhs, but consider decreasing to 12U prior to discharge given well controlled.  -ISS  -hypoglycemic protocol  -diabetic diet  -diabetes education        Hypothyroidism- (present on admission)  Assessment & Plan  TSH wnl Feb 2021  -continue home Levothyroxine    Essential hypertension- (present on admission)  Assessment & Plan  Monitor vitals.      Lower limb amputation, great toe (HCC)- (present on admission)  Assessment & Plan  s/p right great toe amputation 2018    Elevated troponin  Assessment & Plan  Stable. Suspect due to demand ischemia. No need to trend further.    Debility- (present on admission)  Assessment & Plan  -PT/OT consults: recommend Home health PT. Ordered 4 wheeled walker with seat  -needs SNF, referral placed  -patient to ambulate three times daily out of bed on floor with assistance and out of bed to chair for meals.    Chronic venous insufficiency of lower extremity  Assessment & Plan  -elevated legs  -compression stockings  -lotion, soap/water to legs  -weight loss (BMI 38)    Colostomy present (HCC)- (present on admission)  Assessment & Plan  History of fall at home Nov 2020 on left side with perforated colon and splenic fluid collection/hematoma s/p segmental colectomy, colostomy, mobilization of the splenic flexure, wound vac, and I&D Nov 2020.   -needs follow up arranged with surgery prior to discharge  -home health ordered placed for skilled nursing for colostomy bag    Nonischemic cardiomyopathy (HCC)- (present on admission)  Assessment & Plan  Normal coronaries, 2018.Tachycardia mediated  -LVEF improved, based on echo 2/10/2021 LVEF 60%    Mixed hyperlipidemia- (present on admission)  Assessment & Plan  LDL 82 Feb  2021  -repeat lipid panel  -continue Atorvatatin    Obesity- (present on admission)  Assessment & Plan  -recommend weight loss, exercise once medically stable  -nutrition consult

## 2021-02-17 LAB
APTT PPP: 38.8 SEC (ref 24.7–36)
BASOPHILS # BLD AUTO: 0.7 % (ref 0–1.8)
BASOPHILS # BLD: 0.08 K/UL (ref 0–0.12)
EOSINOPHIL # BLD AUTO: 0.43 K/UL (ref 0–0.51)
EOSINOPHIL NFR BLD: 3.6 % (ref 0–6.9)
ERYTHROCYTE [DISTWIDTH] IN BLOOD BY AUTOMATED COUNT: 46.1 FL (ref 35.9–50)
GLUCOSE BLD-MCNC: 111 MG/DL (ref 65–99)
GLUCOSE BLD-MCNC: 112 MG/DL (ref 65–99)
GLUCOSE BLD-MCNC: 115 MG/DL (ref 65–99)
GLUCOSE BLD-MCNC: 134 MG/DL (ref 65–99)
HCT VFR BLD AUTO: 47.3 % (ref 42–52)
HGB BLD-MCNC: 15.6 G/DL (ref 14–18)
IMM GRANULOCYTES # BLD AUTO: 0.05 K/UL (ref 0–0.11)
IMM GRANULOCYTES NFR BLD AUTO: 0.4 % (ref 0–0.9)
INR PPP: 1.05 (ref 0.87–1.13)
LYMPHOCYTES # BLD AUTO: 2.7 K/UL (ref 1–4.8)
LYMPHOCYTES NFR BLD: 22.5 % (ref 22–41)
MCH RBC QN AUTO: 29.4 PG (ref 27–33)
MCHC RBC AUTO-ENTMCNC: 33 G/DL (ref 33.7–35.3)
MCV RBC AUTO: 89.1 FL (ref 81.4–97.8)
MONOCYTES # BLD AUTO: 0.94 K/UL (ref 0–0.85)
MONOCYTES NFR BLD AUTO: 7.8 % (ref 0–13.4)
NEUTROPHILS # BLD AUTO: 7.81 K/UL (ref 1.82–7.42)
NEUTROPHILS NFR BLD: 65 % (ref 44–72)
NRBC # BLD AUTO: 0 K/UL
NRBC BLD-RTO: 0 /100 WBC
PLATELET # BLD AUTO: 245 K/UL (ref 164–446)
PMV BLD AUTO: 11 FL (ref 9–12.9)
PROTHROMBIN TIME: 14 SEC (ref 12–14.6)
RBC # BLD AUTO: 5.31 M/UL (ref 4.7–6.1)
WBC # BLD AUTO: 12 K/UL (ref 4.8–10.8)

## 2021-02-17 PROCEDURE — 82962 GLUCOSE BLOOD TEST: CPT | Mod: 91

## 2021-02-17 PROCEDURE — 36415 COLL VENOUS BLD VENIPUNCTURE: CPT

## 2021-02-17 PROCEDURE — 85610 PROTHROMBIN TIME: CPT

## 2021-02-17 PROCEDURE — A9270 NON-COVERED ITEM OR SERVICE: HCPCS | Performed by: INTERNAL MEDICINE

## 2021-02-17 PROCEDURE — 700102 HCHG RX REV CODE 250 W/ 637 OVERRIDE(OP): Performed by: INTERNAL MEDICINE

## 2021-02-17 PROCEDURE — A9270 NON-COVERED ITEM OR SERVICE: HCPCS | Performed by: PHYSICIAN ASSISTANT

## 2021-02-17 PROCEDURE — 99232 SBSQ HOSP IP/OBS MODERATE 35: CPT | Mod: GC | Performed by: HOSPITALIST

## 2021-02-17 PROCEDURE — A9270 NON-COVERED ITEM OR SERVICE: HCPCS | Performed by: HOSPITALIST

## 2021-02-17 PROCEDURE — 700111 HCHG RX REV CODE 636 W/ 250 OVERRIDE (IP): Performed by: INTERNAL MEDICINE

## 2021-02-17 PROCEDURE — 700102 HCHG RX REV CODE 250 W/ 637 OVERRIDE(OP): Performed by: PHYSICIAN ASSISTANT

## 2021-02-17 PROCEDURE — 700102 HCHG RX REV CODE 250 W/ 637 OVERRIDE(OP): Performed by: PSYCHIATRY & NEUROLOGY

## 2021-02-17 PROCEDURE — 85730 THROMBOPLASTIN TIME PARTIAL: CPT

## 2021-02-17 PROCEDURE — 700102 HCHG RX REV CODE 250 W/ 637 OVERRIDE(OP): Performed by: HOSPITALIST

## 2021-02-17 PROCEDURE — 97535 SELF CARE MNGMENT TRAINING: CPT

## 2021-02-17 PROCEDURE — 770001 HCHG ROOM/CARE - MED/SURG/GYN PRIV*

## 2021-02-17 PROCEDURE — 85025 COMPLETE CBC W/AUTO DIFF WBC: CPT

## 2021-02-17 PROCEDURE — A9270 NON-COVERED ITEM OR SERVICE: HCPCS | Performed by: PSYCHIATRY & NEUROLOGY

## 2021-02-17 RX ADMIN — INSULIN GLARGINE 15 UNITS: 100 INJECTION, SOLUTION SUBCUTANEOUS at 17:09

## 2021-02-17 RX ADMIN — MIDODRINE HYDROCHLORIDE 5 MG: 5 TABLET ORAL at 20:04

## 2021-02-17 RX ADMIN — LEVOTHYROXINE SODIUM 112 MCG: 0.11 TABLET ORAL at 05:14

## 2021-02-17 RX ADMIN — RISPERIDONE 0.5 MG: 0.5 TABLET ORAL at 17:06

## 2021-02-17 RX ADMIN — DIGOXIN 125 MCG: 125 TABLET ORAL at 17:06

## 2021-02-17 RX ADMIN — ENOXAPARIN SODIUM 120 MG: 120 INJECTION SUBCUTANEOUS at 05:11

## 2021-02-17 RX ADMIN — ZOLPIDEM TARTRATE 2.5 MG: 5 TABLET ORAL at 20:56

## 2021-02-17 RX ADMIN — MODAFINIL 100 MG: 100 TABLET ORAL at 06:00

## 2021-02-17 RX ADMIN — ATORVASTATIN CALCIUM 40 MG: 40 TABLET, FILM COATED ORAL at 17:06

## 2021-02-17 RX ADMIN — DOCUSATE SODIUM 50 MG AND SENNOSIDES 8.6 MG 2 TABLET: 8.6; 5 TABLET, FILM COATED ORAL at 05:14

## 2021-02-17 RX ADMIN — NYSTATIN: 100000 POWDER TOPICAL at 05:09

## 2021-02-17 RX ADMIN — MIDODRINE HYDROCHLORIDE 5 MG: 5 TABLET ORAL at 07:44

## 2021-02-17 RX ADMIN — MIDODRINE HYDROCHLORIDE 5 MG: 5 TABLET ORAL at 13:01

## 2021-02-17 RX ADMIN — MIDODRINE HYDROCHLORIDE 5 MG: 5 TABLET ORAL at 17:06

## 2021-02-17 ASSESSMENT — PAIN DESCRIPTION - PAIN TYPE
TYPE: ACUTE PAIN

## 2021-02-17 ASSESSMENT — COGNITIVE AND FUNCTIONAL STATUS - GENERAL
TOILETING: A LITTLE
HELP NEEDED FOR BATHING: A LITTLE
DAILY ACTIVITIY SCORE: 22
SUGGESTED CMS G CODE MODIFIER DAILY ACTIVITY: CJ

## 2021-02-17 ASSESSMENT — FIBROSIS 4 INDEX: FIB4 SCORE: 1.15

## 2021-02-17 NOTE — PROGRESS NOTES
"Daily Progress Note:     Date of Service: 2/17/2021  Primary Team: [facility] White Team   Attending: Jesús Harper M.D.   Senior Resident: Dr. Harper  Intern: Dr. Jefferson  Contact:  [phone]    ID: 56 year old male, admitted for recurrent syncope, fall, orthostatic hypotension and suicidal ideation.     Chief Complaint: \"passed out\"        Subjective:  Overnight, no acute events. The patient reports no pain, SOB, N/V. PO tolerating, having BMs, voiding and ambulating with assistance with improvement. Patient reports SI, plan to cut his wrist or hang himself and would try to do this if I or the 1:1 was not in the room he reports Patient reports same 4 voices that are telling him, \"you're no good\", and \"you should kill yourself\". Reports same nightmare of a man chasing him with an axe.      Interval Update: Seen by Psychiatry, and the legal hold extended, restarted on Risperdal 0.5mg in the evening, continued Modafinil for depression and recommend to stop the Prozac due to increased risk of QTc prolongation. Psychiatry will continue to follow. Patient on a waiting list to start inpatient psychotherapy.Pateint given Ambien last night as well as Trazodone 50mg for sleep. Received the Risperdal and Prozac yesterday.        MEDICAL. TRANSFER ORDERS PLACED.              Consultants/Specialty:  Cardiology-intereventional (Dr. Fernandez)  Psychiatry (Dr. Berger)  Psychology (Dr. Fernandez)  Behavioral Health (therapist-Susan Ewing PhD, [facility])        Review of Systems:   Review of Systems   Constitutional:  Negative for chills, fever and weight loss.   Eyes: Negative.  Negative for blurred vision and double vision.   Respiratory: Negative.  Negative for cough and shortness of breath.    Cardiovascular: Negative.  Negative for chest pain, palpitations, orthopnea and leg swelling.   Gastrointestinal: Negative.  Negative for abdominal pain, constipation, diarrhea, heartburn, nausea and vomiting.   Musculoskeletal:  Negative for joint " pain and myalgias.   Skin: Negative.  Negative for rash.   Neurological: no LOC, dizziness, lightheadedness Negative for tingling, tremors, seizures, weakness and headaches.   Psychiatric/Behavioral: Positive for depression, hallucinations and suicidal ideas. Negative for substance abuse. The patient has insomnia. The patient is not nervous/anxious    Objective Data:   Physical Exam:   Vitals:   Temp:  [36.2 °C (97.2 °F)-36.9 °C (98.4 °F)] 36.9 °C (98.4 °F)  Pulse:  [] 63  Resp:  [18] 18  BP: ()/(66-99) 161/99  SpO2:  [94 %-98 %] 94 %   Physical Exam  Vitals and nursing note reviewed.   Constitutional:       General: He is not in acute distress, sitting up in bed comfortably with 1:1 sitter near bed.     Appearance: Normal appearance. He is obese. He is not ill-appearing or toxic-appearing.   HENT:      Head: Normocephalic and atraumatic.      Mouth/Throat:      Mouth: Mucous membranes are moist.   Eyes:      General:         Right eye: No discharge.         Left eye: No discharge.      Extraocular Movements: Extraocular movements intact.      Conjunctiva/sclera: Conjunctivae normal.      Pupils: Pupils are equal, round, and reactive to light.   Cardiovascular:      Rate and Rhythm: Normal rate and regular rhythm.      Pulses: Normal pulses.      Heart sounds: Normal heart sounds. No murmur. No friction rub. No gallop.    Pulmonary:      Effort: Pulmonary effort is normal. No respiratory distress.      Breath sounds: Normal breath sounds. No stridor. No wheezing, rhonchi or rales.   Abdominal:      General: Bowel sounds are normal. There is no distension.      Palpations: Abdomen is soft.      Tenderness: There is no abdominal tenderness. There is no guarding.      Comments: Surrounding colostomy bag LLQ: clean/dry/intact   Musculoskeletal:         General: No swelling, tenderness, deformity or signs of injury.      Cervical back: Neck supple.      Right lower leg: No edema.      Left lower leg: No  edema.        Skin:     General: Skin is dry.      Capillary Refill: Capillary refill takes less than 2 seconds.   Neurological:      Mental Status: He is alert and oriented to person, place, and time.      Motor: No weakness.      Deep Tendon Reflexes: Reflexes normal.      Psychiatric:         Attention and Perception: Attention normal. He perceives visual hallucinations.         Mood and Affect: Mood is depressed.         Speech: Speech normal. Speech is not rapid and pressured or slurred.         Behavior: Behavior is cooperative.         Thought Content: Thought content includes suicidal ideation. Thought content does not include homicidal ideation. Thought content includes suicidal plan.         Cognition and Memory: Cognition normal.         Judgment: Judgment is inappropriate.     Labs: no new labs    Imaging: no new labs    Problem Representation: 56 year old male with a history of  nonischemic cardiomyopathy with normal coronary angiography (2018), LVEF improved to 60% Feb 2020,  paroxysmal atrial fibrillation (CHADSVASC 3: CHF, HTN, DM), type 2 diabetes, hyperlipidemia, hypertension, previously on oral anticoagulation with Xarelto, medical noncompliance, BMI 38.57, history of orthostatic hypotension/syncope despite midodrine, Florinef, pyridostigmine, oral anticoagulation discontinued 2019 secondary to frequent falls/syncope (extensive evaluation with cardiology and neurology in the past), recurrent MDD with psychotic features with suicidal ideation and attempt 1.5 years ago cutting himself, presented with recurrent syncope, fall and orthostatic hypotension as well as suicidal ideation to cut his wrists and is on a legal hold.      * Suicidal ideation  Assessment & Plan  Has history of MDD, recurrent with psychotic features with active SI w/ plan.  -Psychiatry to see today, Dr. Berger  -Legal Hold continued by Behavioral Health 2/15/21  -patient is medical and transfer orders placed for medical  floor.  -Transfer to an inpatient  psychiatric facility when patient is medically cleared.            Major depressive disorder, recurrent, severe with psychotic features (HCC)  Assessment & Plan  -Psychiatry and Psychology following.no prolongation of QTc on most recent ECG 2/16/21.  -per Psychiatry, continue Modafinil to target depression, and avoid SSRI's to include Prozac due to increased risk of QTc prolongation and restarted Rispderdal 0.5mg every evening  -appreciate Psychiatry recommendations  -patient medically cleared    Orthostatic hypotension- (present on admission)  Assessment & Plan  Multifactorial, but suspect mostly contributed due to autonomic dysfunction from diabetes. Other contributing factors are due to chronic venous insufficiency,medications (lasix, BB, CCB). Does not appear dehydrated. Echocardiogram shows improved EF 60% with no concerning valvular dysfunction. No history of seizures. Not hypoglycemic. Cosyntropin test with cortisol >18 which rules out adrenal insufficiency.patient ambulated today and was not orthostatic, but does have some dyspnea on exertion secondary Afib, HFrEF and nonischemic cardiomyopathy.not requiring supplemental oxygen.  -hold BB, CCB, Flomax  -continue telemetry  -Per Cardiology, no further cardiac work-up  -daily orthostatic BP  -per Cardiology, Midodrine adjusted to 5mg four times daily  -medically cleared        Chest pain  Assessment & Plan  Resolved after evaluation by night team. Suspect due to musculoskeletal etiology as well as underlying mood disorder with increased anxious mood.ECG showed no ischemic changes, troponin elevation not suspected due to ACS, but due to HFrEF and Afib.      Syncope  Assessment & Plan  See orthostatic hypotension section. Does have a history of frequent falls in the past several months.    Paroxysmal atrial fibrillation (HCC)  Assessment & Plan  CHADSVASC 3 (HTN, CHF, DM). Rate controlled. TTE Feb 2021: EF 60%,   -hold BB, CCB  at this time due to bradycardia on admission and hypotension  -Cards restarted digoxin  -continue telemetry  -goal HR <110 at rest  -keep Mg>2, K>4  -previously on Xarelto and stopped 2019 due to fall risk; however, according to guidelines if has not had more than 300 falls in the past 12 months, benefit outweighs the risk to continue with chronic anticoagulation. Will reassess tomorrow when to restart Xarelto vs. Apixiban.    Chronic HFrEF (heart failure with reduced ejection fraction) ([facility])  Assessment & Plan  EF 35% on Echo 2020, improved on TTE Feb 2021 to EF 60%. BNP >4k->downtrending.   -continue Lasix as prescribed  -hold Lisinopril and Toprol XL at this time; may restart upon discharge  -strict I/0's      Diabetes mellitus type 2, insulin dependent ([facility])  Assessment & Plan  Controlled given co-morbidities. A1c 7.8% Nov 2020, repeat 5.2% Feb 2021.  -continue home Lantus 15U qhs, but consider decreasing to 12U prior to discharge given well controlled.  -ISS  -hypoglycemic protocol  -diabetic diet  -diabetes education        Hypothyroidism- (present on admission)  Assessment & Plan  TSH wnl Feb 2021  -continue home Levothyroxine    Essential hypertension- (present on admission)  Assessment & Plan  Monitor vitals.      Lower limb amputation, great toe ([facility])- (present on admission)  Assessment & Plan  s/p right great toe amputation 2018    Elevated troponin  Assessment & Plan  Stable. Suspect due to demand ischemia. No need to trend further.    Debility- (present on admission)  Assessment & Plan  -PT/OT consults: recommend Home health PT. Ordered 4 wheeled walker with seat  -needs SNF, referral placed  -patient to ambulate three times daily out of bed on floor with assistance and out of bed to chair for meals.    Chronic venous insufficiency of lower extremity  Assessment & Plan  -elevated legs  -compression stockings  -lotion, soap/water to legs  -weight loss (BMI 38)    Colostomy present ([facility])- (present on  admission)  Assessment & Plan  History of fall at home Nov 2020 on left side with perforated colon and splenic fluid collection/hematoma s/p segmental colectomy, colostomy, mobilization of the splenic flexure, wound vac, and I&D Nov 2020.   -needs follow up arranged with surgery prior to discharge  -home health ordered placed for skilled nursing for colostomy bag    Nonischemic cardiomyopathy (HCC)- (present on admission)  Assessment & Plan  Normal coronaries, 2018.Tachycardia mediated  -LVEF improved, based on echo 2/10/2021 LVEF 60%    Mixed hyperlipidemia- (present on admission)  Assessment & Plan  LDL 82 Feb 2021  -repeat lipid panel  -continue Atorvatatin    Obesity- (present on admission)  Assessment & Plan  -recommend weight loss, exercise once medically stable  -nutrition consult

## 2021-02-17 NOTE — DISCHARGE PLANNING
[facility]ility [facility]  Outcome: Referrals resent.    @[id number]  Agency/Facility Name: [facility]   Outcome: Voice mail left regarding referral. Requested a call back.    Agency/Facility Name: [facility]   Outcome: Voice mail left regarding referral. Requested a call back.

## 2021-02-17 NOTE — CONSULTS
"PSYCHIATRIC FOLLOW-UP:(established)  *Reason for admission:   Syncope, hypotension     *Legal Hold Status: On hold          Chart reviewed.         *HPI: Patient reports his weekend was terrible.  Reports that he is feeling very depressed with worsening of auditory hallucinations telling him to kill himself.  He states he hears about 5 voices telling him he would be better off dead, commanding him to hang himself with bed sheets.  Patient reports that he will probably kill himself and asked for assistance with medication to target the voices.  He is currently feeling suicidal. \"  I cannot go on like this.  Something bad is going to happen.  It is not worth living anymore.\"  Patient states that he is sleeping 3 to 4 hours at night, sleeping disturbed by nightmares of being chased with an ax.  When asked if he is scared of dying, patient stated that he is not.  Patient's appetite is good and his energy is better.  Patient has been on Prozac, Abilify, Geodon and risperidone in the past.  I reviewed patient's chart and his history, and is unclear why patient had just a few days trial Abilify.  At first he did not remember being on Abilify, and currently does not remember any side effects to the medication in the past.  I explained to the patient that all antipsychotics have a cardiac risk, and that initiating any antipsychotic could affect his heart and make his heart diseases worse.  Patient  wants to start an antipsychotic, stating that he can no longer live with the auditory hallucinations.  Patient conveyed clear understanding of the potential risks and side effects on his heart.  He agreed with restarting risperidone.         Medical ROS (as pertinent):     Review of Systems   Constitutional: Negative for malaise/fatigue.   HENT: Negative for sore throat.    Eyes: Positive for blurred vision.   Respiratory: Negative for shortness of breath.    Cardiovascular:        Reported chest pain this morning, currently " "resolved   Gastrointestinal: Positive for nausea. Negative for abdominal pain, constipation, diarrhea and vomiting.   Genitourinary: Negative for dysuria.   Musculoskeletal: Negative for myalgias.   Skin: Negative for rash.   Neurological: Positive for weakness. Negative for dizziness and headaches.   Psychiatric/Behavioral: Positive for depression, hallucinations and suicidal ideas. The patient has insomnia.            *Psychiatric Examination:  Vitals:   Vitals:    02/16/21 1652   BP: 128/86   Pulse:    Resp:    Temp:    SpO2:        General Appearance: Patient was walking on the unit with the medical student prior to to evaluation.  He was calm and cooperative, with good eye contact  Abnormal Movements: None  Gait and Posture: Normal lying bed  Speech: Normal volume, tone and rhythm  Thought processes: Linear  Associations: No loose associations  Abnormal or Psychotic Thoughts: Worsening of auditory hallucinations  Judgement and Insight: Fair/fair  Orientation: Grossly oriented  Recent and Remote Memory: Appears intact  Attention Span and Concentration: Intact  Language: Fluid  Fund of Knowledge: Not tested  Mood and Affect:\" Feeling very depressed\", depressed  SI/HI: Endorses suicidal ideation with possible intent.        *EKG: , A. fib     *Labs personally reviewed: Magnesium, CMP, CBC, troponin.     Assessment: Patient is endorsing worsening of his depression and auditory hallucinations.  Continues to endorse suicidal thoughts with possible intention.  Patient is very distressed about the auditory hallucinations and is requesting to be restarted on antipsychotic, despite of knowing the cardiovascular risks.    Prozac can increase QTC prolongation, cause torsades the points and ventricular arrhythmias.  I do not recommend starting Prozac or another SSRI at this time.  Would prefer to restart risperidone at a very low dose and monitor BP and QTc.      Modafinil can also be used on the treatment of " unipolar depression, and compared to SSRIs, does not affect QTC.  Patient has a history of QTC prolongation.     Dx:  MDD, recurrent, severe with psychotic features    Medical:  Orthostatic hypotension  Syncope  Paroxysmal atrial fibrillation  Chronic heart failure  Diabetes type 2  Hypothyroidism  Essential hypertension  Debility  Chronic venous insufficiency of lower extremity  Colostomy present  Nonischemic cardiomyopathy  Mixed hyperlipidemia  Obesity      Plan:  1- Legal hold: Extended  2- Psychotropic medications: Start risperidone 0.5 mg p.o. nightly to target psychosis.  Continue modafinil 100 mg p.o. daily to target depression.  I recommend discontinuing Prozac.  3- Please transfer pt to inpatient psychiatric hospital when medically cleared and bed is available  4-patient is currently on a waiting list to start inpatient psychotherapy  5- Psychiatry will follow up.    Thank you for the consult.       Sitter: Yes  Phone, Visitors, Personal belongings: Per nurse's discretion    This note was created using voice recognition software (Dragon). The accuracy of the dictation is limited by the abilities of the software. I have reviewed the note prior to signing. However, error related to voice recognition software and /or scribes may still exist and should be interpreted within the appropriate context.

## 2021-02-17 NOTE — DISCHARGE PLANNING
Anticipated Discharge Disposition: inpatient psych    Action: LSW spoke to admissions at [facility] and referral was not received. LSW asked DPA to resend referral.     LSW left a VM for [facility] regarding referral.    LSW spoke to admissions at [facility]. Referral not received.     KALIA Gutierrez resending referral to all facilities.    Barriers to Discharge: accepting facility    Plan: LSW to follow up with facilities regarding referral.

## 2021-02-17 NOTE — PROGRESS NOTES
1:1 sitter notified this RN that pt's old IV site was bleeding out of dressing and onto linens.  This RN changed saturated dressing, cleaned site and applied new occlusive dressing.  Pt asymptomatic, clean and resting

## 2021-02-17 NOTE — PROGRESS NOTES
Assumed care of patient at bedside report from NOC RN. Updated on POC. Patient currently sitting up in bed. Call light within reach. Whiteboard updated. Fall precautions in place. Bed locked and in lowest position. All questions answered. No other needs indicated at this time.    One to one sitter in place.

## 2021-02-17 NOTE — CARE PLAN
Problem: Communication  Goal: The ability to communicate needs accurately and effectively will improve  Outcome: PROGRESSING AS EXPECTED     Problem: Safety  Goal: Will remain free from injury  Outcome: PROGRESSING AS EXPECTED  Goal: Will remain free from falls  Outcome: PROGRESSING AS EXPECTED     Problem: Bowel/Gastric:  Goal: Will not experience complications related to bowel motility  Outcome: PROGRESSING AS EXPECTED     Problem: Knowledge Deficit  Goal: Knowledge of the prescribed therapeutic regimen will improve  Outcome: PROGRESSING AS EXPECTED     Problem: Discharge Barriers/Planning  Goal: Patient's continuum of care needs will be met  Outcome: PROGRESSING AS EXPECTED     Problem: Psychosocial Needs:  Goal: Level of anxiety will decrease  Outcome: PROGRESSING SLOWER THAN EXPECTED     Problem: Mobility  Goal: Risk for activity intolerance will decrease  Outcome: PROGRESSING AS EXPECTED     Problem: Skin Integrity  Goal: Risk for impaired skin integrity will decrease  Outcome: PROGRESSING AS EXPECTED

## 2021-02-18 LAB
GLUCOSE BLD-MCNC: 107 MG/DL (ref 65–99)
GLUCOSE BLD-MCNC: 109 MG/DL (ref 65–99)
GLUCOSE BLD-MCNC: 115 MG/DL (ref 65–99)
GLUCOSE BLD-MCNC: 130 MG/DL (ref 65–99)

## 2021-02-18 PROCEDURE — 700102 HCHG RX REV CODE 250 W/ 637 OVERRIDE(OP): Performed by: HOSPITALIST

## 2021-02-18 PROCEDURE — A9270 NON-COVERED ITEM OR SERVICE: HCPCS | Performed by: INTERNAL MEDICINE

## 2021-02-18 PROCEDURE — 302102 BAG OST N IMG 2.25IN 2PC (FECAL): Performed by: HOSPITALIST

## 2021-02-18 PROCEDURE — 302111 WAFER OST 2.25IN N IMG RD 2 PC (BARRIER): Performed by: HOSPITALIST

## 2021-02-18 PROCEDURE — 700102 HCHG RX REV CODE 250 W/ 637 OVERRIDE(OP): Performed by: PHYSICIAN ASSISTANT

## 2021-02-18 PROCEDURE — 700102 HCHG RX REV CODE 250 W/ 637 OVERRIDE(OP): Performed by: GENERAL PRACTICE

## 2021-02-18 PROCEDURE — A9270 NON-COVERED ITEM OR SERVICE: HCPCS | Performed by: PHYSICIAN ASSISTANT

## 2021-02-18 PROCEDURE — 700102 HCHG RX REV CODE 250 W/ 637 OVERRIDE(OP): Performed by: INTERNAL MEDICINE

## 2021-02-18 PROCEDURE — 82962 GLUCOSE BLOOD TEST: CPT

## 2021-02-18 PROCEDURE — 99231 SBSQ HOSP IP/OBS SF/LOW 25: CPT | Mod: GC | Performed by: HOSPITALIST

## 2021-02-18 PROCEDURE — 770001 HCHG ROOM/CARE - MED/SURG/GYN PRIV*

## 2021-02-18 PROCEDURE — A9270 NON-COVERED ITEM OR SERVICE: HCPCS | Performed by: PSYCHIATRY & NEUROLOGY

## 2021-02-18 PROCEDURE — A9270 NON-COVERED ITEM OR SERVICE: HCPCS | Performed by: GENERAL PRACTICE

## 2021-02-18 PROCEDURE — A9270 NON-COVERED ITEM OR SERVICE: HCPCS | Performed by: HOSPITALIST

## 2021-02-18 PROCEDURE — 700102 HCHG RX REV CODE 250 W/ 637 OVERRIDE(OP): Performed by: PSYCHIATRY & NEUROLOGY

## 2021-02-18 RX ORDER — NYSTATIN 100000 [USP'U]/G
POWDER TOPICAL 2 TIMES DAILY
Status: COMPLETED | OUTPATIENT
Start: 2021-02-18 | End: 2021-02-25

## 2021-02-18 RX ADMIN — LEVOTHYROXINE SODIUM 112 MCG: 0.11 TABLET ORAL at 05:21

## 2021-02-18 RX ADMIN — MIDODRINE HYDROCHLORIDE 5 MG: 5 TABLET ORAL at 20:13

## 2021-02-18 RX ADMIN — ZOLPIDEM TARTRATE 2.5 MG: 5 TABLET ORAL at 23:21

## 2021-02-18 RX ADMIN — MIDODRINE HYDROCHLORIDE 5 MG: 5 TABLET ORAL at 08:59

## 2021-02-18 RX ADMIN — MODAFINIL 100 MG: 100 TABLET ORAL at 05:21

## 2021-02-18 RX ADMIN — INSULIN GLARGINE 15 UNITS: 100 INJECTION, SOLUTION SUBCUTANEOUS at 17:08

## 2021-02-18 RX ADMIN — MIDODRINE HYDROCHLORIDE 5 MG: 5 TABLET ORAL at 13:39

## 2021-02-18 RX ADMIN — NYSTATIN: 100000 POWDER TOPICAL at 14:54

## 2021-02-18 RX ADMIN — RISPERIDONE 0.5 MG: 0.5 TABLET ORAL at 17:03

## 2021-02-18 RX ADMIN — MIDODRINE HYDROCHLORIDE 5 MG: 5 TABLET ORAL at 17:03

## 2021-02-18 RX ADMIN — DOCUSATE SODIUM 50 MG AND SENNOSIDES 8.6 MG 2 TABLET: 8.6; 5 TABLET, FILM COATED ORAL at 17:04

## 2021-02-18 RX ADMIN — DIGOXIN 125 MCG: 125 TABLET ORAL at 17:03

## 2021-02-18 RX ADMIN — APIXABAN 5 MG: 5 TABLET, FILM COATED ORAL at 11:20

## 2021-02-18 RX ADMIN — ATORVASTATIN CALCIUM 40 MG: 40 TABLET, FILM COATED ORAL at 17:03

## 2021-02-18 RX ADMIN — APIXABAN 5 MG: 5 TABLET, FILM COATED ORAL at 17:04

## 2021-02-18 ASSESSMENT — PAIN DESCRIPTION - PAIN TYPE
TYPE: ACUTE PAIN

## 2021-02-18 ASSESSMENT — CHA2DS2 SCORE
CHF OR LEFT VENTRICULAR DYSFUNCTION: YES
PRIOR STROKE OR TIA OR THROMBOEMBOLISM: NO
SEX: MALE
CHA2DS2 VASC SCORE: 3
VASCULAR DISEASE: NO
AGE 75 OR GREATER: NO
HYPERTENSION: YES
DIABETES: YES
AGE 65 TO 74: NO

## 2021-02-18 ASSESSMENT — FIBROSIS 4 INDEX: FIB4 SCORE: 1.15

## 2021-02-18 NOTE — DISCHARGE PLANNING
Anticipated Discharge Disposition: inpatient psych    Action: LSW received a call from [facility] requesting more information on patient's ostomy. LSW informed them that patient had HH set up to care for his ostomy.     LSW received another call from [facility] and they are declining because patient is too medically complex.    Bedside RN to see if patient can do his own ostomy change at next scheduled change. RN has been doing changes for patient.    Barriers to Discharge: acceptance, bed availability, ostomy     Plan: LSW to follow up on acceptance at other facilities

## 2021-02-18 NOTE — THERAPY
"Occupational Therapy  Daily Treatment     Patient Name: Sebastián Castro  Age:  56 y.o., Sex:  male  Medical Record #: [id number]  Today's Date: 2/17/2021     Precautions: Fall Risk    Assessment    Pt seen for OT tx, agreeable to OOB functional tasks. Pt demonstrated all functional transfers at SPV level (toilet txf, bed mobility, bed>chair) without c/o dizziness, no drop in BP noted following activity (146/86 sitting in chair) Pt also demonstrated LB dress, standing grooming and household ambulation with 4WW at SPV level, pt met all acute OT goals at this time.     Plan    Discharge secondary to goals met.    DC Equipment Recommendations: Unable to determine at this time  Discharge Recommendations: Anticipate that the patient will have no further occupational therapy needs after discharge from the hospital. Recommend DC to behavioral health facility.    Subjective    \"I'm just struggling with suicidal ideation and the voices.\" (pt reassured that his care team is working on getting him the help he needs at the next facility he goes to)     Objective     02/17/21 1627   Vitals   Patient BP Position Sitting   Blood Pressure 146/82   O2 Delivery Device None - Room Air   Vitals Comments no c/o OH symptoms   Cognition    Level of Consciousness Alert   Comments pt somewhat childlike and flat affect, reports feeling \"depressed with suicidal ideation and voices\"   Balance   Weight Shift Sitting Good   Weight Shift Standing Fair   Comments standing with 4WW   Bed Mobility    Supine to Sit Supervised   Sit to Supine   (to chair)   Scooting Supervised   Rolling Supervised   Activities of Daily Living   Eating   (NT)   Grooming Supervision;Standing  (handwashing at sink)   Upper Body Dressing Supervision   Lower Body Dressing Supervision  (bariatric socks)   Toileting   (demo'd toilet txf, unclear if pt can manage ostomy indep)   Functional Mobility   Sit to Stand Supervised   Bed, Chair, Wheelchair Transfer Supervised   Toilet " Transfers Minimal Assist   Mobility w/4WW   Short Term Goals   Short Term Goal # 1 pt will complete FB dressing w/spv    Goal Outcome # 1 Goal met   Short Term Goal # 2 pt will complete toilet txf w/spv    Goal Outcome # 2 Goal met   Short Term Goal # 3 pt will complete groomig standing at sink w/spv    Goal Outcome # 3 Goal met

## 2021-02-18 NOTE — DISCHARGE PLANNING
Anticipated Discharge Disposition: inpatient psych    Action: Patient was discussed in IDT rounds. Patient medically clear for psych. Patient was educated on ostomy care by bedside RN. Bedside RN reports that patient was successful in completing ostomy care.    Barriers to Discharge: acceptance, bed availability, ostomy    Plan: LSW to follow up on psych referrals

## 2021-02-18 NOTE — PROGRESS NOTES
Assumed care of patient at bedside report from NOC RN. Updated on POC. Patient currently A & O x 4; on room air; no complaints of acute pain. Call light within reach. Whiteboard updated. Fall precautions in place. Bed locked and in lowest position. All questions answered. No other needs indicated at this time.

## 2021-02-18 NOTE — CONSULTS
"Brief Behavioral Health Note:    Length of Intervention: 30 Minutes    HPI: Sebastián is a 56 year old male who presented on 3/9/2021 with syncope. Patient has a past medical history of atrial fibrillation, congestive heart failure, diabetes, diabetic neuropathy, hyperlipidemia, HTN, and orthostatic hypotension. Patient continues to endorse suicidal ideation and severe depression.    Assessment:  Patient was observed seated on hospital chair, with sitter and Bedside RN who reports just completing a walk with patient. Patient continues to endorse suicidal ideations stating, \"I asked the doctor for an antidepressant because I still feel really depressed and suicidal\". Asked patient if he understood the reason the psychiatrist has taken a conservative approach with the treatment of his depression due to her concern of the affects of psychotropic medications?   Patient reports he understands Dr. Berger is concerned about the affects of the medication on his heart. Patient states, \"my heart is here, I am concerned about my depression and suicidal thoughts, I was given Risperidone and the voices are worse, its not helping at all, I need something for my depression\".    Discussed with patient the need to build coping skills to help manage life issues. Explained medication alone does not correct the way we think and view problems. This is a skill we have to develop so that when problems occur we can face them, deal with them effectively, without going into a deep depression or suicidal thoughts. Patient agreed that he needs help with this and is willing to address in outpatient therapy upon discharge.     Recommendations:  1. Legal hold continued  2. One to one sitter  3. Follow all legal hold protocols as per policy  4. Case Management-please assist patient in securing outpatient counseling services based on his insurance providers  5. Transfer to a inpatient psychiatric hospital when a bed becomes available.    Thank " breezy Ewing, Ph.D., [facility]

## 2021-02-18 NOTE — CARE PLAN
Problem: Communication  Goal: The ability to communicate needs accurately and effectively will improve  Outcome: PROGRESSING AS EXPECTED  Note: Patient oriented to room and surroundings. Patient educated to communicate needs accurately and effectively. Patient encouraged to vocalize questions and concerns regarding POC. No concerns or questions noted at this time.        Problem: Safety  Goal: Will remain free from falls  Outcome: PROGRESSING AS EXPECTED  Note: Patient oriented to room and surroundings. Patient educated to communicate needs accurately and effectively. Patient encouraged to vocalize questions and concerns regarding POC. No concerns or questions noted at this time.

## 2021-02-18 NOTE — PROGRESS NOTES
"Daily Progress Note:     Date of Service: 2/18/2021  Primary Team: [facility] White Team   Attending: Jesús Harper M.D.   Senior Resident: Dr. Ley  Intern: Dr. Jefferson  Contact:  [phone]    ID: 56 year old male, admitted for recurrent syncope, fall, orthostatic hypotension and suicidal ideation.     Chief Complaint: \"passed out\"        Subjective:  Overnight, no acute events. The patient reports he has no pain, SOB, N/V. PO tolerating, having BM, voiding and ambulating without issue. Reports suicidal ideation still. Hears more voices now. 10 total voices, 9 of which are evil, older, men that give him commands to kill himself and say \"You are no good, you should kill  Yourself\". He also hears a young, female voice that is not evil or good, but is neutral.      Interval Update:  started on Eliquis for anticoagulation given has chronic afib.        MEDICAL. TRANSFER ORDERS PLACED.              Consultants/Specialty:  Cardiology-intereventional (Dr. Fernandez)  Psychiatry (Dr. Berger)  Psychology (Dr. Fernandez)  Behavioral Health (therapist-Susan Ewing PhD, [facility])        Review of Systems:   Review of Systems   Constitutional:  Negative for chills, fever and weight loss.   Eyes: Negative.  Negative for blurred vision and double vision.   Respiratory: Negative.  Negative for cough and shortness of breath.    Cardiovascular: Negative.  Negative for chest pain, palpitations, orthopnea and leg swelling.   Gastrointestinal: Negative.  Negative for abdominal pain, constipation, diarrhea, heartburn, nausea and vomiting.   Musculoskeletal:  Negative for joint pain and myalgias.   Skin: Negative.  Negative for rash.   Neurological: no LOC, dizziness, lightheadedness Negative for tingling, tremors, seizures, weakness and headaches.   Psychiatric/Behavioral: Positive for depression, hallucinations and suicidal ideas. Negative for substance abuse. The patient has insomnia. The patient is not nervous/anxious         Objective " Data:   Physical Exam:   Vitals:   Temp:  [36.1 °C (96.9 °F)-37.1 °C (98.7 °F)] 36.3 °C (97.3 °F)  Pulse:  [] 103  Resp:  [15-18] 18  BP: (114-151)/(55-96) 123/86  SpO2:  [92 %-97 %] 95 %   Physical Exam  Vitals and nursing note reviewed.   Constitutional:       General: He is not in acute distress, sitting up in bed comfortably with 1:1 sitter near bed.     Appearance: Normal appearance. He is obese. He is not ill-appearing or toxic-appearing.   HENT:      Head: Normocephalic and atraumatic.      Mouth/Throat:      Mouth: Mucous membranes are moist.   Eyes:      General:         Right eye: No discharge.         Left eye: No discharge.      Extraocular Movements: Extraocular movements intact.      Conjunctiva/sclera: Conjunctivae normal.      Pupils: Pupils are equal, round, and reactive to light.   Cardiovascular:      Rate and Rhythm: Normal rate and regular rhythm.      Pulses: Normal pulses.      Heart sounds: Normal heart sounds. No murmur. No friction rub. No gallop.    Pulmonary:      Effort: Pulmonary effort is normal. No respiratory distress.      Breath sounds: Normal breath sounds. No stridor. No wheezing, rhonchi or rales.   Abdominal:      General: Bowel sounds are normal. There is no distension.      Palpations: Abdomen is soft.      Tenderness: There is no abdominal tenderness. There is no guarding.      Comments: Surrounding colostomy bag LLQ: clean/dry/intact   Musculoskeletal:         General: No swelling, tenderness, deformity or signs of injury.      Cervical back: Neck supple.      Right lower leg: No edema.      Left lower leg: No edema.        Skin:     General: Skin is dry.      Capillary Refill: Capillary refill takes less than 2 seconds.   Neurological:      Mental Status: He is alert and oriented to person, place, and time.      Motor: No weakness.      Deep Tendon Reflexes: Reflexes normal.      Psychiatric:         Attention and Perception: Attention normal. He perceives  visual hallucinations.         Mood and Affect: Mood is depressed.         Speech: Speech normal. Speech is not rapid and pressured or slurred.         Behavior: Behavior is cooperative.         Thought Content: Thought content includes suicidal ideation. Thought content does not include homicidal ideation. Thought content includes suicidal plan.         Cognition and Memory: Cognition normal.         Judgment: Judgment is inappropriate.      Labs:   Recent Labs     02/16/21  0552 02/17/21  1623   WBC 11.1* 12.0*   RBC 5.29 5.31   HEMOGLOBIN 15.1 15.6   HEMATOCRIT 46.3 47.3   MCV 87.5 89.1   MCH 28.5 29.4   RDW 44.7 46.1   PLATELETCT 243 245   MPV 10.8 11.0   NEUTSPOLYS 61.50 65.00   LYMPHOCYTES 25.00 22.50   MONOCYTES 8.30 7.80   EOSINOPHILS 4.10 3.60   BASOPHILS 0.60 0.70       PT/INR and PTT: wnl     Imaging: no new labs     Problem Representation: 56 year old male with a history of  nonischemic cardiomyopathy with normal coronary angiography (2018), LVEF improved to 60% Feb 2020,  paroxysmal atrial fibrillation (CHADSVASC 3: CHF, HTN, DM), type 2 diabetes, hyperlipidemia, hypertension, previously on oral anticoagulation with Xarelto, medical noncompliance, BMI 38.57, history of orthostatic hypotension/syncope despite midodrine, Florinef, pyridostigmine, oral anticoagulation discontinued 2019 secondary to frequent falls/syncope (extensive evaluation with cardiology and neurology in the past), recurrent MDD with psychotic features with suicidal ideation and attempt 1.5 years ago cutting himself, presented with recurrent syncope, fall and orthostatic hypotension as well as suicidal ideation to cut his wrists and is on a legal hold.      * Suicidal ideation  Assessment & Plan  Has history of MDD, recurrent with psychotic features with active SI w/ plan.  -Psychiatry to see today, Dr. Berger  -Legal Hold continued by Behavioral Health 2/15/21  -patient is medical and transfer orders placed for medical  floor.  -Transfer to an inpatient  psychiatric facility when patient is medically cleared.            Major depressive disorder, recurrent, severe with psychotic features (HCC)  Assessment & Plan  -Psychiatry and Psychology following.no prolongation of QTc on most recent ECG 2/16/21.  -per Psychiatry, continue Modafinil to target depression, and avoid SSRI's to include Prozac due to increased risk of QTc prolongation and restarted Rispderdal 0.5mg every evening  -appreciate Psychiatry recommendations  -patient medically cleared    Orthostatic hypotension- (present on admission)  Assessment & Plan  Multifactorial, but suspect mostly contributed due to autonomic dysfunction from diabetes. Other contributing factors are due to chronic venous insufficiency,medications (lasix, BB, CCB). Does not appear dehydrated. Echocardiogram shows improved EF 60% with no concerning valvular dysfunction. No history of seizures. Not hypoglycemic. Cosyntropin test with cortisol >18 which rules out adrenal insufficiency.patient ambulated today and was not orthostatic, but does have some dyspnea on exertion secondary Afib, HFrEF and nonischemic cardiomyopathy.not requiring supplemental oxygen.  -hold BB, CCB, Flomax  -continue telemetry  -Per Cardiology, no further cardiac work-up  -daily orthostatic BP  -per Cardiology, Midodrine adjusted to 5mg four times daily  -medically cleared        Syncope  Assessment & Plan  See orthostatic hypotension section. Does have a history of frequent falls in the past several months.    Paroxysmal atrial fibrillation (HCC)  Assessment & Plan  CHADSVASC 3 (HTN, CHF, DM). Rate controlled. TTE Feb 2021: EF 60%,   -hold BB, CCB at this time due to bradycardia on admission and hypotension  -Cards restarted digoxin  -continue telemetry  -goal HR <110 at rest  -keep Mg>2, K>4  -previously on Xarelto and stopped 2019 due to fall risk; however, according to guidelines if has not had more than 300 falls in the  past 12 months, benefit outweighs the risk to continue with chronic anticoagulation.   -start on chronic anticoagulation with Eliquis 5mg BID given lower risks of bleeding.    Chronic HFrEF (heart failure with reduced ejection fraction) ([facility])  Assessment & Plan  EF 35% on Echo 2020, improved on TTE Feb 2021 to EF 60%. BNP >4k->downtrending.   -continue Lasix as prescribed  -hold Lisinopril and Toprol XL at this time; may restart upon discharge  -strict I/0's      Diabetes mellitus type 2, insulin dependent ([facility])  Assessment & Plan  Controlled given co-morbidities. A1c 7.8% Nov 2020, repeat 5.2% Feb 2021.  -continue home Lantus 15U qhs, but consider decreasing to 12U prior to discharge given well controlled.  -ISS  -hypoglycemic protocol  -diabetic diet  -diabetes education        Hypothyroidism- (present on admission)  Assessment & Plan  TSH wnl Feb 2021  -continue home Levothyroxine    Essential hypertension- (present on admission)  Assessment & Plan  Monitor vitals.      Lower limb amputation, great toe ([facility])- (present on admission)  Assessment & Plan  s/p right great toe amputation 2018    Elevated troponin  Assessment & Plan  Stable. Suspect due to demand ischemia. No need to trend further.    Debility- (present on admission)  Assessment & Plan  -PT/OT consults: recommend Home health PT. Ordered 4 wheeled walker with seat  -needs SNF, referral placed  -patient to ambulate three times daily out of bed on floor with assistance and out of bed to chair for meals.    Chest pain  Assessment & Plan  resolved      Chronic venous insufficiency of lower extremity  Assessment & Plan  -elevated legs  -compression stockings  -lotion, soap/water to legs  -weight loss (BMI 38)    Colostomy present ([facility])- (present on admission)  Assessment & Plan  History of fall at home Nov 2020 on left side with perforated colon and splenic fluid collection/hematoma s/p segmental colectomy, colostomy, mobilization of the splenic flexure,  wound vac, and I&D Nov 2020.   -needs follow up arranged with surgery prior to discharge  -home health ordered placed for skilled nursing for colostomy bag    Nonischemic cardiomyopathy (HCC)- (present on admission)  Assessment & Plan  Normal coronaries, 2018.Tachycardia mediated  -LVEF improved, based on echo 2/10/2021 LVEF 60%    Mixed hyperlipidemia- (present on admission)  Assessment & Plan  LDL 82 Feb 2021  -repeat lipid panel  -continue Atorvatatin    Obesity- (present on admission)  Assessment & Plan  -recommend weight loss, exercise once medically stable  -nutrition consult

## 2021-02-18 NOTE — DISCHARGE PLANNING
Agency/Facility Name: [facility]   Outcome: Voice mail left regarding referral. Requested a call back.    Agency/Facility Name: [facility]   Outcome: Voice mail left regarding referral. Requested a call back.

## 2021-02-19 LAB
GLUCOSE BLD-MCNC: 114 MG/DL (ref 65–99)
GLUCOSE BLD-MCNC: 120 MG/DL (ref 65–99)
GLUCOSE BLD-MCNC: 123 MG/DL (ref 65–99)
GLUCOSE BLD-MCNC: 129 MG/DL (ref 65–99)

## 2021-02-19 PROCEDURE — 700102 HCHG RX REV CODE 250 W/ 637 OVERRIDE(OP): Performed by: GENERAL PRACTICE

## 2021-02-19 PROCEDURE — 97116 GAIT TRAINING THERAPY: CPT | Mod: CQ

## 2021-02-19 PROCEDURE — 700102 HCHG RX REV CODE 250 W/ 637 OVERRIDE(OP): Performed by: INTERNAL MEDICINE

## 2021-02-19 PROCEDURE — 700102 HCHG RX REV CODE 250 W/ 637 OVERRIDE(OP): Performed by: PHYSICIAN ASSISTANT

## 2021-02-19 PROCEDURE — A9270 NON-COVERED ITEM OR SERVICE: HCPCS | Performed by: GENERAL PRACTICE

## 2021-02-19 PROCEDURE — A9270 NON-COVERED ITEM OR SERVICE: HCPCS | Performed by: PHYSICIAN ASSISTANT

## 2021-02-19 PROCEDURE — 97530 THERAPEUTIC ACTIVITIES: CPT | Mod: CQ

## 2021-02-19 PROCEDURE — A9270 NON-COVERED ITEM OR SERVICE: HCPCS | Performed by: INTERNAL MEDICINE

## 2021-02-19 PROCEDURE — A9270 NON-COVERED ITEM OR SERVICE: HCPCS | Performed by: HOSPITALIST

## 2021-02-19 PROCEDURE — A9270 NON-COVERED ITEM OR SERVICE: HCPCS | Performed by: PSYCHIATRY & NEUROLOGY

## 2021-02-19 PROCEDURE — 770001 HCHG ROOM/CARE - MED/SURG/GYN PRIV*

## 2021-02-19 PROCEDURE — 700102 HCHG RX REV CODE 250 W/ 637 OVERRIDE(OP): Performed by: HOSPITALIST

## 2021-02-19 PROCEDURE — 99231 SBSQ HOSP IP/OBS SF/LOW 25: CPT | Mod: GC | Performed by: HOSPITALIST

## 2021-02-19 PROCEDURE — 99233 SBSQ HOSP IP/OBS HIGH 50: CPT | Performed by: PSYCHIATRY & NEUROLOGY

## 2021-02-19 PROCEDURE — 700102 HCHG RX REV CODE 250 W/ 637 OVERRIDE(OP): Performed by: PSYCHIATRY & NEUROLOGY

## 2021-02-19 PROCEDURE — 82962 GLUCOSE BLOOD TEST: CPT | Mod: 91

## 2021-02-19 RX ORDER — MODAFINIL 100 MG/1
150 TABLET ORAL EVERY MORNING
Status: DISCONTINUED | OUTPATIENT
Start: 2021-02-20 | End: 2021-02-26

## 2021-02-19 RX ADMIN — INSULIN GLARGINE 15 UNITS: 100 INJECTION, SOLUTION SUBCUTANEOUS at 16:48

## 2021-02-19 RX ADMIN — APIXABAN 5 MG: 5 TABLET, FILM COATED ORAL at 04:38

## 2021-02-19 RX ADMIN — MIDODRINE HYDROCHLORIDE 5 MG: 5 TABLET ORAL at 11:54

## 2021-02-19 RX ADMIN — RISPERIDONE 0.5 MG: 0.5 TABLET ORAL at 16:47

## 2021-02-19 RX ADMIN — ZOLPIDEM TARTRATE 2.5 MG: 5 TABLET ORAL at 21:31

## 2021-02-19 RX ADMIN — MODAFINIL 100 MG: 100 TABLET ORAL at 09:43

## 2021-02-19 RX ADMIN — NYSTATIN: 100000 POWDER TOPICAL at 04:38

## 2021-02-19 RX ADMIN — MIDODRINE HYDROCHLORIDE 5 MG: 5 TABLET ORAL at 16:47

## 2021-02-19 RX ADMIN — MIDODRINE HYDROCHLORIDE 5 MG: 5 TABLET ORAL at 07:43

## 2021-02-19 RX ADMIN — MIDODRINE HYDROCHLORIDE 5 MG: 5 TABLET ORAL at 21:31

## 2021-02-19 RX ADMIN — NYSTATIN: 100000 POWDER TOPICAL at 16:47

## 2021-02-19 RX ADMIN — APIXABAN 5 MG: 5 TABLET, FILM COATED ORAL at 16:46

## 2021-02-19 RX ADMIN — DOCUSATE SODIUM 50 MG AND SENNOSIDES 8.6 MG 2 TABLET: 8.6; 5 TABLET, FILM COATED ORAL at 04:38

## 2021-02-19 RX ADMIN — DIGOXIN 125 MCG: 125 TABLET ORAL at 16:47

## 2021-02-19 RX ADMIN — LEVOTHYROXINE SODIUM 112 MCG: 0.11 TABLET ORAL at 04:38

## 2021-02-19 RX ADMIN — ATORVASTATIN CALCIUM 40 MG: 40 TABLET, FILM COATED ORAL at 16:46

## 2021-02-19 ASSESSMENT — ENCOUNTER SYMPTOMS
HEADACHES: 0
DEPRESSION: 1
INSOMNIA: 1
TREMORS: 0
BLURRED VISION: 1
HALLUCINATIONS: 0
CONSTIPATION: 0
NAUSEA: 0
CHILLS: 1
MYALGIAS: 0
NERVOUS/ANXIOUS: 0
DIARRHEA: 0
SORE THROAT: 0
ABDOMINAL PAIN: 0
VOMITING: 0
SHORTNESS OF BREATH: 0

## 2021-02-19 ASSESSMENT — COGNITIVE AND FUNCTIONAL STATUS - GENERAL
CLIMB 3 TO 5 STEPS WITH RAILING: A LITTLE
MOVING TO AND FROM BED TO CHAIR: A LITTLE
MOBILITY SCORE: 20
WALKING IN HOSPITAL ROOM: A LITTLE
TURNING FROM BACK TO SIDE WHILE IN FLAT BAD: A LITTLE
SUGGESTED CMS G CODE MODIFIER MOBILITY: CJ

## 2021-02-19 ASSESSMENT — GAIT ASSESSMENTS
GAIT LEVEL OF ASSIST: SUPERVISED
DEVIATION: BRADYKINETIC
ASSISTIVE DEVICE: 4 WHEEL WALKER
DISTANCE (FEET): 250

## 2021-02-19 ASSESSMENT — FIBROSIS 4 INDEX: FIB4 SCORE: 1.15

## 2021-02-19 ASSESSMENT — PAIN DESCRIPTION - PAIN TYPE: TYPE: ACUTE PAIN

## 2021-02-19 NOTE — PROGRESS NOTES
"Daily Progress Note:     Date of Service: 2/19/2021  Primary Team: [facility] White Team   Attending: Jesús Harper M.D.   Senior Resident: Dr. Ley  Intern: Dr. Jefferson  Contact:  [phone]    ID: 56 year old male, admitted for recurrent syncope, fall, orthostatic hypotension and suicidal ideation.     Chief Complaint: \"passed out\"        Subjective:  Overnight, no acute events. The patient reports he has no pain, SOB, N/V. PO tolerating, having BM, voiding without issue. Ambulated 4 times yesterday without symptoms, the last time in the evening went completely around the floor twice. Reports suicidal ideation still. Hears more voices now. 10 total voices, 9 of which are evil, older, men that give him commands to kill himself and say \"You are no good, you should kill  Yourself\". He also hears a young, female voice that is not evil or good, but is neutral.      Interval Update:  started on Eliquis for anticoagulation given has chronic afib.        MEDICAL. TRANSFER ORDERS PLACED.              Consultants/Specialty:  Cardiology-intereventional (Dr. Fernandez)  Psychiatry (Dr. Berger)  Psychology (Dr. Fernandez)  Behavioral Health (therapist-Susan Ewing PhD, [facility])        Review of Systems:   Review of Systems   Constitutional:  Negative for chills, fever and weight loss.   Eyes: Negative.  Negative for blurred vision and double vision.   Respiratory: Negative.  Negative for cough and shortness of breath.    Cardiovascular: Negative.  Negative for chest pain, palpitations, orthopnea and leg swelling.   Gastrointestinal: Negative.  Negative for abdominal pain, constipation, diarrhea, heartburn, nausea and vomiting.   Musculoskeletal:  Negative for joint pain and myalgias.   Skin: Negative.  Negative for rash.   Neurological: no LOC, dizziness, lightheadedness Negative for tingling, tremors, seizures, weakness and headaches.   Psychiatric/Behavioral: Positive for depression, hallucinations and suicidal ideas. Negative " for substance abuse. The patient has insomnia. The patient is not nervous/anxious    Objective Data:   Physical Exam:   Vitals:   Temp:  [36.2 °C (97.1 °F)-36.4 °C (97.6 °F)] 36.2 °C (97.1 °F)  Pulse:  [] 54  Resp:  [17-18] 17  BP: (115-150)/(71-86) 115/79  SpO2:  [90 %-97 %] 90 %   Physical Exam  Vitals and nursing note reviewed.   Constitutional:       General: He is not in acute distress, sitting up in bed comfortably with 1:1 sitter near bed.     Appearance: Normal appearance. He is obese. He is not ill-appearing or toxic-appearing.   HENT:      Head: Normocephalic and atraumatic.      Mouth/Throat:      Mouth: Mucous membranes are moist.   Eyes:      General:         Right eye: No discharge.         Left eye: No discharge.      Extraocular Movements: Extraocular movements intact.      Conjunctiva/sclera: Conjunctivae normal.      Pupils: Pupils are equal, round, and reactive to light.   Cardiovascular:      Rate and Rhythm: Normal rate and regular rhythm.      Pulses: Normal pulses.      Heart sounds: Normal heart sounds. No murmur. No friction rub. No gallop.    Pulmonary:      Effort: Pulmonary effort is normal. No respiratory distress.      Breath sounds: Normal breath sounds. No stridor. No wheezing, rhonchi or rales.   Abdominal:      General: Bowel sounds are normal. There is no distension.      Palpations: Abdomen is soft.      Tenderness: There is no abdominal tenderness. There is no guarding.      Comments: Surrounding colostomy bag LLQ: clean/dry/intact   Musculoskeletal:         General: No swelling, tenderness, deformity or signs of injury.      Cervical back: Neck supple.      Right lower leg: No edema.      Left lower leg: No edema.        Skin:     General: Skin is dry.      Capillary Refill: Capillary refill takes less than 2 seconds.   Neurological:      Mental Status: He is alert and oriented to person, place, and time.      Motor: No weakness.      Deep Tendon Reflexes: Reflexes normal.       Psychiatric:         Attention and Perception: Attention normal. He perceives visual hallucinations.         Mood and Affect: Mood is depressed.         Speech: Speech normal. Speech is not rapid and pressured or slurred.         Behavior: Behavior is cooperative.         Thought Content: Thought content includes suicidal ideation. Thought content does not include homicidal ideation. Thought content includes suicidal plan.         Cognition and Memory: Cognition normal.         Judgment: Judgment is inappropriate.        Labs: no new labs    Imaging: no new labs    Problem Representation: 56 year old male with a history of  nonischemic cardiomyopathy with normal coronary angiography (2018), LVEF improved to 60% Feb 2020,  paroxysmal atrial fibrillation (CHADSVASC 3: CHF, HTN, DM), type 2 diabetes, hyperlipidemia, hypertension, previously on oral anticoagulation with Xarelto, medical noncompliance, BMI 38.57, history of orthostatic hypotension/syncope despite midodrine, Florinef, pyridostigmine, oral anticoagulation discontinued 2019 secondary to frequent falls/syncope (extensive evaluation with cardiology and neurology in the past), recurrent MDD with psychotic features with suicidal ideation and attempt 1.5 years ago cutting himself, presented with recurrent syncope, fall and orthostatic hypotension as well as suicidal ideation to cut his wrists and is on a legal hold.      * Suicidal ideation  Assessment & Plan  Has history of MDD, recurrent with psychotic features with active SI w/ plan.  -Psychiatry to see today, Dr. Berger  -Legal Hold continued by Behavioral Health 2/15/21  -patient is medical and transfer orders placed for medical floor.  -Transfer to an inpatient  psychiatric facility when patient is medically cleared.            Major depressive disorder, recurrent, severe with psychotic features (HCC)  Assessment & Plan  Improved.  -Psychiatry and Psychology following.no prolongation of QTc on most  recent ECG 2/16/21.  -per Psychiatry, continue Modafinil to target depression, and avoid SSRI's to include Prozac due to increased risk of QTc prolongation and restarted Rispderdal 0.5mg every evening  -appreciate Psychiatry recommendations  -patient medically cleared    Orthostatic hypotension- (present on admission)  Assessment & Plan  Multifactorial, but suspect mostly contributed due to autonomic dysfunction from diabetes. Other contributing factors are due to chronic venous insufficiency,medications (lasix, BB, CCB). Does not appear dehydrated. Echocardiogram shows improved EF 60% with no concerning valvular dysfunction. No history of seizures. Not hypoglycemic. Cosyntropin test with cortisol >18 which rules out adrenal insufficiency.patient ambulated today and was not orthostatic, but does have some dyspnea on exertion secondary Afib, HFrEF and nonischemic cardiomyopathy.not requiring supplemental oxygen.  -hold BB, CCB, Flomax  -continue telemetry  -Per Cardiology, no further cardiac work-up  -daily orthostatic BP  -per Cardiology, Midodrine adjusted to 5mg four times daily  -medically cleared        Syncope  Assessment & Plan  See orthostatic hypotension section. Does have a history of frequent falls in the past several months.    Paroxysmal atrial fibrillation (HCC)  Assessment & Plan  CHADSVASC 3 (HTN, CHF, DM). Rate controlled. TTE Feb 2021: EF 60%,   -hold BB, CCB at this time due to bradycardia on admission and hypotension  -Cards restarted digoxin  -continue telemetry  -goal HR <110 at rest  -keep Mg>2, K>4  -previously on Xarelto and stopped 2019 due to fall risk; however, according to guidelines if has not had more than 300 falls in the past 12 months, benefit outweighs the risk to continue with chronic anticoagulation.   -start on chronic anticoagulation with Eliquis 5mg BID given lower risks of bleeding.    Chronic HFrEF (heart failure with reduced ejection fraction) (HCC)  Assessment & Plan  EF  35% on Echo 2020, improved on TTE Feb 2021 to EF 60%. BNP >4k->downtrending.   -continue Lasix as prescribed  -hold Lisinopril and Toprol XL at this time; may restart upon discharge  -strict I/0's      Diabetes mellitus type 2, insulin dependent (HCC)  Assessment & Plan  Controlled given co-morbidities. A1c 7.8% Nov 2020, repeat 5.2% Feb 2021.BS ranged 107-130 over the past 24 hours.  -continue home Lantus 15U qhs  -ISS  -hypoglycemic protocol  -diabetic diet  -diabetes education        Hypothyroidism- (present on admission)  Assessment & Plan  TSH wnl Feb 2021  -continue home Levothyroxine    Essential hypertension- (present on admission)  Assessment & Plan  Monitor vitals.      Lower limb amputation, great toe (HCC)- (present on admission)  Assessment & Plan  s/p right great toe amputation 2018    Elevated troponin  Assessment & Plan  Stable. Suspect due to demand ischemia. No need to trend further.    Debility- (present on admission)  Assessment & Plan  -PT/OT consults: recommend Home health PT. Ordered 4 wheeled walker with seat  -needs SNF, referral placed  -patient to ambulate three times daily out of bed on floor with assistance and out of bed to chair for meals.    Chest pain  Assessment & Plan  resolved      Chronic venous insufficiency of lower extremity  Assessment & Plan  -elevated legs  -compression stockings  -lotion, soap/water to legs  -weight loss (BMI 38)    Colostomy present (HCC)- (present on admission)  Assessment & Plan  History of fall at home Nov 2020 on left side with perforated colon and splenic fluid collection/hematoma s/p segmental colectomy, colostomy, mobilization of the splenic flexure, wound vac, and I&D Nov 2020.   -needs follow up arranged with surgery prior to discharge  -home health ordered placed for skilled nursing for colostomy bag    Nonischemic cardiomyopathy (HCC)- (present on admission)  Assessment & Plan  Normal coronaries, 2018.Tachycardia mediated  -LVEF improved, based  on echo 2/10/2021 LVEF 60%    Mixed hyperlipidemia- (present on admission)  Assessment & Plan  LDL 82 Feb 2021  -repeat lipid panel  -continue Atorvatatin    Obesity- (present on admission)  Assessment & Plan  -recommend weight loss, exercise once medically stable  -nutrition consult

## 2021-02-19 NOTE — PROGRESS NOTES
Assumed care of pt. Bedside report received from Leydi PÉREZ. Pt was updated on plan of care. Call light, phone and personal belongings in reach. Bed alarm on and working properly, bed in lowest position, and locked. Pt has sitter at bedside. Pt has no complaints of pain, and is resting comfortably.

## 2021-02-19 NOTE — CONSULTS
PSYCHIATRIC FOLLOW-UP:(established)  *Reason for admission:   Syncope, hypotension     *Legal Hold Status on Admission:   On hold         Chart reviewed.         *HPI: Patient reports that the auditory hallucinations have resolved.  However continues to feel very depressed with suicidal thoughts.  Stated that if there were no sitter in his room that he will likely slit his wrists.  Patient is also endorsing some insomnia and increasing anxiety.  At night he states that his mind races and is constantly worry about his future, and not being able to get back to his normal life again.  Stated that he has been in and out of the hospital since last December, which is upsetting him and triggering his suicidal thoughts.  Patient however has been drawing walks around the unit and doing puzzles.  He has fair appetite and his energy has improved.          Medical ROS (as pertinent):     Review of Systems   Constitutional: Positive for chills.   HENT: Negative for sore throat.    Eyes: Positive for blurred vision.   Respiratory: Negative for shortness of breath.    Cardiovascular: Negative for chest pain.   Gastrointestinal: Negative for abdominal pain, constipation, diarrhea, nausea and vomiting.   Genitourinary: Negative for dysuria.   Musculoskeletal: Negative for myalgias.   Skin: Negative for rash.   Neurological: Negative for tremors and headaches.   Psychiatric/Behavioral: Positive for depression and suicidal ideas. Negative for hallucinations. The patient has insomnia. The patient is not nervous/anxious.            *Psychiatric Examination:  Vitals:   Vitals:    02/19/21 0820   BP: 148/90   Pulse: 70   Resp: 16   Temp: 36.3 °C (97.3 °F)   SpO2: 98%       Appearance: appears stated age, fair grooming and hygiene, calm, cooperative, good eye contact, sitting on a chair eating lunch  Abnormal movements: none  Gait/posture: normal  Speech: normal volume, tone and rhythm  Though process: linear and goal  "oriented  Associations: no loose associations  Thought content: denies AVH, no delusions or paranoia elicited, does not appear to be responding to internal stimuli, neither is internally preoccupied.   Judgement and Insight: Poor/limited  Orientation: Grossly oriented  Recent and Remote Memory: intact  Attention Span and Concentration: intact  Language: fluid   Fund of Knowledge: not tested   Mood and Affect:\" I am feeling very depressed\", constricted but reactive  SI/HI: Endorses suicidal ideation with intention and method.    Assessment: Continues to feel depressed with suicidal thoughts.  Psychosis resolved.      Dx:  MDD, recurrent, severe with psychotic features    Medical:  Orthostatic hypotension  Syncope  Paroxysmal atrial fibrillation  Chronic heart failure  Diabetes type 2  Hypothyroidism  Essential hypertension  Debility  Chronic venous insufficiency of lower extremity  Colostomy present  Nonischemic cardiomyopathy  Mixed hyperlipidemia  Obesity      Plan:  1- Legal hold: Extended  2- Psychotropic medications: Increase modafinil to 150 mg p.o. daily to target depression  Continue Risperdal 0.5 mg p.o. nightly to target psychosis  3- Please transfer pt to inpatient psychiatric hospital when bed is available  4- Discussed the case with: Dr. Fernandez  5- Psychiatry will follow up.    Thank you for the consult.     Sitter: Yes  Phone: Yes  Visitors: Yes  Personal belongings: No  "

## 2021-02-19 NOTE — PROGRESS NOTES
Assumed care at 0800, bedside report received from Kirsty. Pt is medical on the telemetry monitor. Patient is AO x 4 and is resting I nbed. Initial assessment completed and orders reviewed. POC discussed with patient. Call light within reach and hourly rounding in place. No further questions at this time. Fall precautions in place.    1:1 sitter @ bedside.

## 2021-02-19 NOTE — DISCHARGE PLANNING
Anticipated Discharge Disposition: inpatient psych    Action: LSW left a VM for [facility] regarding patient's referral. LSW also spoke to admissions at [facility] and they have referral but they have no beds.    Barriers to Discharge: bed availability    Plan: LSW to follow up on referral

## 2021-02-19 NOTE — THERAPY
"Physical Therapy   Daily Treatment     Patient Name: Sebastián Castro  Age:  56 y.o., Sex:  male  Medical Record #: [id number]  Today's Date: 2/19/2021     Precautions: Fall Risk    Assessment    Pt progressing well w/ therapy. Pt able to perform all mobility w/out assist. He had no c/o dizziness throughout and stated \"that really hasn't been a problem, it's more the voices in my head.\" Pt w/ good safety awareness and locking brakes of 4WW w/ transfers. He has been motivated to ambulate and has been walking 4-5 times a day. Pt has met all therapy goals and no longer requires skilled acute therapy. Patient will not be actively followed for physical therapy services at this time, however may be seen if requested by physician for 1 more visit within 30 days to address any discharge or equipment needs    Plan    Discharge secondary to goals met.    DC Equipment Recommendations: None  Discharge Recommendations: Anticipate that the patient will have no further physical therapy needs after discharge from the hospital(Psych facility per notes)      Subjective    \"I remember you telling me that I need to talk while I walk.\"     Objective       02/19/21 0941   Precautions   Precautions Fall Risk   Comments Orthostatics   Gait Analysis   Gait Level Of Assist Supervised   Assistive Device 4 Wheel Walker   Distance (Feet) 250   # of Times Distance was Traveled 1   Deviation Bradykinetic   # of Stairs Climbed 4   Level of Assist with Stairs Supervised   Bed Mobility    Comments NT up in chair pre/post. States no difficulty.   Functional Mobility   Sit to Stand Supervised   Bed, Chair, Wheelchair Transfer Supervised   Transfer Method Other (Comments)  (Ambulating)   Mobility With 4WW   Short Term Goals    Short Term Goal # 1 Pt will perform functional transfers with SPV in 6 visits to increase independence   Goal Outcome # 1 Goal met   Short Term Goal # 2 Pt will ambulae 100ft with FWW and SPV in 6 visits to increase independence   Goal " Outcome # 2 Goal met   Short Term Goal # 3 Pt will ascend/descend 4 steps with SPV in 6 visits to enter/exit home   Goal Outcome # 3 Goal met

## 2021-02-20 LAB
GLUCOSE BLD-MCNC: 101 MG/DL (ref 65–99)
GLUCOSE BLD-MCNC: 115 MG/DL (ref 65–99)
GLUCOSE BLD-MCNC: 144 MG/DL (ref 65–99)
GLUCOSE BLD-MCNC: 99 MG/DL (ref 65–99)

## 2021-02-20 PROCEDURE — A9270 NON-COVERED ITEM OR SERVICE: HCPCS | Performed by: INTERNAL MEDICINE

## 2021-02-20 PROCEDURE — 99233 SBSQ HOSP IP/OBS HIGH 50: CPT | Performed by: PSYCHIATRY & NEUROLOGY

## 2021-02-20 PROCEDURE — 700102 HCHG RX REV CODE 250 W/ 637 OVERRIDE(OP): Performed by: INTERNAL MEDICINE

## 2021-02-20 PROCEDURE — A9270 NON-COVERED ITEM OR SERVICE: HCPCS

## 2021-02-20 PROCEDURE — 99231 SBSQ HOSP IP/OBS SF/LOW 25: CPT | Mod: GC | Performed by: HOSPITALIST

## 2021-02-20 PROCEDURE — 700102 HCHG RX REV CODE 250 W/ 637 OVERRIDE(OP): Performed by: GENERAL PRACTICE

## 2021-02-20 PROCEDURE — A9270 NON-COVERED ITEM OR SERVICE: HCPCS | Performed by: PHYSICIAN ASSISTANT

## 2021-02-20 PROCEDURE — 700102 HCHG RX REV CODE 250 W/ 637 OVERRIDE(OP): Performed by: PHYSICIAN ASSISTANT

## 2021-02-20 PROCEDURE — A9270 NON-COVERED ITEM OR SERVICE: HCPCS | Performed by: HOSPITALIST

## 2021-02-20 PROCEDURE — 700102 HCHG RX REV CODE 250 W/ 637 OVERRIDE(OP)

## 2021-02-20 PROCEDURE — 82962 GLUCOSE BLOOD TEST: CPT

## 2021-02-20 PROCEDURE — A9270 NON-COVERED ITEM OR SERVICE: HCPCS | Performed by: GENERAL PRACTICE

## 2021-02-20 PROCEDURE — 700102 HCHG RX REV CODE 250 W/ 637 OVERRIDE(OP): Performed by: PSYCHIATRY & NEUROLOGY

## 2021-02-20 PROCEDURE — A9270 NON-COVERED ITEM OR SERVICE: HCPCS | Performed by: PSYCHIATRY & NEUROLOGY

## 2021-02-20 PROCEDURE — 770001 HCHG ROOM/CARE - MED/SURG/GYN PRIV*

## 2021-02-20 PROCEDURE — 700102 HCHG RX REV CODE 250 W/ 637 OVERRIDE(OP): Performed by: HOSPITALIST

## 2021-02-20 RX ORDER — MODAFINIL 100 MG/1
TABLET ORAL
Status: COMPLETED
Start: 2021-02-20 | End: 2021-02-20

## 2021-02-20 RX ADMIN — DIGOXIN 125 MCG: 125 TABLET ORAL at 17:26

## 2021-02-20 RX ADMIN — NYSTATIN: 100000 POWDER TOPICAL at 04:38

## 2021-02-20 RX ADMIN — DOCUSATE SODIUM 50 MG AND SENNOSIDES 8.6 MG 2 TABLET: 8.6; 5 TABLET, FILM COATED ORAL at 04:37

## 2021-02-20 RX ADMIN — MODAFINIL 150 MG: 100 TABLET ORAL at 04:37

## 2021-02-20 RX ADMIN — NYSTATIN: 100000 POWDER TOPICAL at 17:28

## 2021-02-20 RX ADMIN — APIXABAN 5 MG: 5 TABLET, FILM COATED ORAL at 17:26

## 2021-02-20 RX ADMIN — INSULIN GLARGINE 15 UNITS: 100 INJECTION, SOLUTION SUBCUTANEOUS at 17:24

## 2021-02-20 RX ADMIN — ZOLPIDEM TARTRATE 2.5 MG: 5 TABLET ORAL at 21:21

## 2021-02-20 RX ADMIN — MIDODRINE HYDROCHLORIDE 5 MG: 5 TABLET ORAL at 17:26

## 2021-02-20 RX ADMIN — ATORVASTATIN CALCIUM 40 MG: 40 TABLET, FILM COATED ORAL at 17:26

## 2021-02-20 RX ADMIN — APIXABAN 5 MG: 5 TABLET, FILM COATED ORAL at 04:37

## 2021-02-20 RX ADMIN — MIDODRINE HYDROCHLORIDE 5 MG: 5 TABLET ORAL at 08:14

## 2021-02-20 RX ADMIN — LEVOTHYROXINE SODIUM 112 MCG: 0.11 TABLET ORAL at 04:37

## 2021-02-20 RX ADMIN — MIDODRINE HYDROCHLORIDE 5 MG: 5 TABLET ORAL at 20:12

## 2021-02-20 RX ADMIN — RISPERIDONE 0.5 MG: 0.5 TABLET ORAL at 17:26

## 2021-02-20 RX ADMIN — MIDODRINE HYDROCHLORIDE 5 MG: 5 TABLET ORAL at 13:36

## 2021-02-20 ASSESSMENT — PAIN DESCRIPTION - PAIN TYPE
TYPE: ACUTE PAIN
TYPE: ACUTE PAIN

## 2021-02-20 ASSESSMENT — FIBROSIS 4 INDEX: FIB4 SCORE: 1.15

## 2021-02-20 NOTE — PROGRESS NOTES
"Daily Progress Note:     Date of Service: 2/20/2021  Primary Team: [facility] IM White Team   Attending: Jesús Harper M.D.   Senior Resident: Dr. Ley  Intern: Dr. Jefferson  Contact:  [phone]    ID: 56 year old male, admitted for recurrent syncope, fall, orthostatic hypotension and suicidal ideation.     Chief Complaint: \"passed out\"        Subjective:  Overnight, the patient saw Psychiatry. Patient reports he walked around the unit four times and feels better, keeps his mind off things. He likes doing the puzzles. Has no more voices now. Still has suicidal ideation, plan to cut his wrist. Still has the same nightmare of a man chasing him with an axe. PO tolerating. Having BM, voiding without issue. Is anxious to leave the hospital and get better.       Interval Update:  seen by Psychiatry yesterday with Modafinil adjusted.      MEDICAL. TRANSFER ORDERS PLACED.              Consultants/Specialty:  Cardiology-intereventional (Dr. Fernandez)  Psychiatry (Dr. Berger)  Psychology (Dr. Fernandez)  Behavioral Health (therapist-Susan Ewing PhD, [facility])        Review of Systems:   Review of Systems   Constitutional:  Negative for chills, fever and weight loss.   Eyes: Negative.  Negative for blurred vision and double vision.   Respiratory: Negative.  Negative for cough and shortness of breath.    Cardiovascular: Negative.  Negative for chest pain, palpitations, orthopnea and leg swelling.   Gastrointestinal: Negative.  Negative for abdominal pain, constipation, diarrhea, heartburn, nausea and vomiting.   Musculoskeletal:  Negative for joint pain and myalgias.   Skin: Negative.  Negative for rash.   Neurological: no LOC, dizziness, lightheadedness Negative for tingling, tremors, seizures, weakness and headaches.   Psychiatric/Behavioral: Positive for depression, hallucinations and suicidal ideas. Negative for substance abuse. The patient has insomnia. The patient is not nervous/anxious    Objective Data:   Physical Exam: "   Vitals:   Temp:  [36.2 °C (97.2 °F)-36.3 °C (97.4 °F)] 36.2 °C (97.2 °F)  Pulse:  [60-82] 60  Resp:  [16-17] 17  BP: ()/(67-90) 145/76  SpO2:  [96 %-98 %] 96 %   Physical Exam  Vitals and nursing note reviewed.   Constitutional:       General: He is not in acute distress.     Appearance: Normal appearance. He is not ill-appearing, toxic-appearing or diaphoretic.   HENT:      Head: Normocephalic and atraumatic.   Eyes:      General:         Right eye: No discharge.         Left eye: No discharge.   Cardiovascular:      Rate and Rhythm: Normal rate and regular rhythm.      Pulses: Normal pulses.      Heart sounds: Normal heart sounds. No murmur. No friction rub. No gallop.    Pulmonary:      Effort: Pulmonary effort is normal. No respiratory distress.      Breath sounds: Normal breath sounds. No stridor. No wheezing, rhonchi or rales.   Abdominal:      General: Bowel sounds are normal. There is no distension.      Palpations: Abdomen is soft.      Tenderness: There is no abdominal tenderness.   Musculoskeletal:      Right lower leg: No edema.      Left lower leg: No edema.   Skin:     General: Skin is warm.   Neurological:      General: No focal deficit present.      Mental Status: He is alert and oriented to person, place, and time.   Psychiatric:         Attention and Perception: Attention normal.         Mood and Affect: Mood is depressed. Affect is flat.         Speech: Speech normal.         Behavior: Behavior is cooperative.         Thought Content: Thought content includes suicidal ideation. Thought content includes suicidal plan.           Labs: no new labs    Imaging: no new imaging    Problem Representation: 56 year old male with a history of  nonischemic cardiomyopathy with normal coronary angiography (2018), LVEF improved to 60% Feb 2020,  paroxysmal atrial fibrillation (CHADSVASC 3: CHF, HTN, DM), type 2 diabetes, hyperlipidemia, hypertension, previously on oral anticoagulation with Xarelto,  medical noncompliance, BMI 38.57, history of orthostatic hypotension/syncope despite midodrine, Florinef, pyridostigmine, oral anticoagulation discontinued 2019 secondary to frequent falls/syncope (extensive evaluation with cardiology and neurology in the past), recurrent MDD with psychotic features with suicidal ideation and attempt 1.5 years ago cutting himself, presented with recurrent syncope, fall and orthostatic hypotension as well as suicidal ideation to cut his wrists and is on a legal hold.      * Suicidal ideation  Assessment & Plan  Has history of MDD, recurrent with psychotic features with active SI w/ plan.  -Psychiatry to see today, Dr. Berger  -Legal Hold continued by Behavioral Health 2/15/21  -patient is medical and transfer orders placed for medical floor.  -Transfer to an inpatient  psychiatric facility when patient is medically cleared.            Major depressive disorder, recurrent, severe with psychotic features (HCC)  Assessment & Plan  Improved.  -Psychiatry and Psychology following.no prolongation of QTc on most recent ECG 2/16/21.  -per Psychiatry, continue Modafinil to target depression, and avoid SSRI's to include Prozac due to increased risk of QTc prolongation and restarted Rispderdal 0.5mg every evening  -appreciate Psychiatry recommendations  -patient medically cleared    Orthostatic hypotension- (present on admission)  Assessment & Plan  Multifactorial, but suspect mostly contributed due to autonomic dysfunction from diabetes. Other contributing factors are due to chronic venous insufficiency,medications (lasix, BB, CCB). Does not appear dehydrated. Echocardiogram shows improved EF 60% with no concerning valvular dysfunction. No history of seizures. Not hypoglycemic. Cosyntropin test with cortisol >18 which rules out adrenal insufficiency.patient ambulated today and was not orthostatic, but does have some dyspnea on exertion secondary Afib, HFrEF and nonischemic cardiomyopathy.not  requiring supplemental oxygen.  -hold BB, CCB, Flomax  -continue telemetry  -Per Cardiology, no further cardiac work-up  -daily orthostatic BP  -per Cardiology, Midodrine adjusted to 5mg four times daily  -medically cleared        Syncope  Assessment & Plan  See orthostatic hypotension section. Does have a history of frequent falls in the past several months.    Paroxysmal atrial fibrillation (HCC)  Assessment & Plan  CHADSVASC 3 (HTN, CHF, DM). Rate controlled. TTE Feb 2021: EF 60%,   -hold BB, CCB at this time due to bradycardia on admission and hypotension  -Cards restarted digoxin  -continue telemetry  -goal HR <110 at rest  -keep Mg>2, K>4  -previously on Xarelto and stopped 2019 due to fall risk; however, according to guidelines if has not had more than 300 falls in the past 12 months, benefit outweighs the risk to continue with chronic anticoagulation.   -start on chronic anticoagulation with Eliquis 5mg BID given lower risks of bleeding.    Chronic HFrEF (heart failure with reduced ejection fraction) ([facility])  Assessment & Plan  EF 35% on Echo 2020, improved on TTE Feb 2021 to EF 60%. BNP >4k->downtrending.   -continue Lasix as prescribed  -hold Lisinopril and Toprol XL at this time; may restart upon discharge  -strict I/0's      Diabetes mellitus type 2, insulin dependent ([facility])  Assessment & Plan  Controlled given co-morbidities. A1c 7.8% Nov 2020, repeat 5.2% Feb 2021.BS ranged 107-130 over the past 24 hours.  -continue home Lantus 15U qhs  -ISS  -hypoglycemic protocol  -diabetic diet  -diabetes education        Hypothyroidism- (present on admission)  Assessment & Plan  TSH wnl Feb 2021  -continue home Levothyroxine    Essential hypertension- (present on admission)  Assessment & Plan  Monitor vitals.      Lower limb amputation, great toe (HCC)- (present on admission)  Assessment & Plan  s/p right great toe amputation 2018    Elevated troponin  Assessment & Plan  Stable. Suspect due to demand ischemia. No need  to trend further.    Debility- (present on admission)  Assessment & Plan  -PT/OT consults: recommend Home health PT. Ordered 4 wheeled walker with seat  -needs SNF, referral placed  -patient to ambulate three times daily out of bed on floor with assistance and out of bed to chair for meals.    Chest pain  Assessment & Plan  resolved      Chronic venous insufficiency of lower extremity  Assessment & Plan  -elevated legs  -compression stockings  -lotion, soap/water to legs  -weight loss (BMI 38)    Colostomy present (HCC)- (present on admission)  Assessment & Plan  History of fall at home Nov 2020 on left side with perforated colon and splenic fluid collection/hematoma s/p segmental colectomy, colostomy, mobilization of the splenic flexure, wound vac, and I&D Nov 2020.   -needs follow up arranged with surgery prior to discharge  -home health ordered placed for skilled nursing for colostomy bag    Nonischemic cardiomyopathy (HCC)- (present on admission)  Assessment & Plan  Normal coronaries, 2018.Tachycardia mediated  -LVEF improved, based on echo 2/10/2021 LVEF 60%    Mixed hyperlipidemia- (present on admission)  Assessment & Plan  LDL 82 Feb 2021  -repeat lipid panel  -continue Atorvatatin    Obesity- (present on admission)  Assessment & Plan  -recommend weight loss, exercise once medically stable  -nutrition consult

## 2021-02-20 NOTE — PROGRESS NOTES
Assumed care at 1915. Bedside report received from laura RN. Patient's chart and MAR reviewed. 12 hour chart check complete. Assessment complete, pt 0/10 pain at this time. Pt is awake in bed. Pt is A & O x 4. Patient was updated on plan of care for the day. Questions answered and concerns addressed.  Pt denies any additional needs at this time. White board updated. Call light, phone and personal belongings within reach. Bed alarm on and working appropriately. Vital signs stable.

## 2021-02-20 NOTE — PROGRESS NOTES
Assumed care of patient after bedside report with ELISA Ruiz.  Pt sitting in chair, awake and alert, in no apparent distress.  Denies discomfort. No requests.  Sitter at bedside.  Will continue to monitor.

## 2021-02-21 LAB
GLUCOSE BLD-MCNC: 107 MG/DL (ref 65–99)
GLUCOSE BLD-MCNC: 113 MG/DL (ref 65–99)
GLUCOSE BLD-MCNC: 117 MG/DL (ref 65–99)
GLUCOSE BLD-MCNC: 118 MG/DL (ref 65–99)
GLUCOSE BLD-MCNC: 134 MG/DL (ref 65–99)

## 2021-02-21 PROCEDURE — 700102 HCHG RX REV CODE 250 W/ 637 OVERRIDE(OP): Performed by: HOSPITALIST

## 2021-02-21 PROCEDURE — 770001 HCHG ROOM/CARE - MED/SURG/GYN PRIV*

## 2021-02-21 PROCEDURE — 82962 GLUCOSE BLOOD TEST: CPT

## 2021-02-21 PROCEDURE — 700102 HCHG RX REV CODE 250 W/ 637 OVERRIDE(OP): Performed by: PSYCHIATRY & NEUROLOGY

## 2021-02-21 PROCEDURE — A9270 NON-COVERED ITEM OR SERVICE: HCPCS | Performed by: INTERNAL MEDICINE

## 2021-02-21 PROCEDURE — A9270 NON-COVERED ITEM OR SERVICE: HCPCS | Performed by: PHYSICIAN ASSISTANT

## 2021-02-21 PROCEDURE — 99231 SBSQ HOSP IP/OBS SF/LOW 25: CPT | Mod: GC | Performed by: HOSPITALIST

## 2021-02-21 PROCEDURE — 700102 HCHG RX REV CODE 250 W/ 637 OVERRIDE(OP): Performed by: INTERNAL MEDICINE

## 2021-02-21 PROCEDURE — A9270 NON-COVERED ITEM OR SERVICE: HCPCS | Performed by: GENERAL PRACTICE

## 2021-02-21 PROCEDURE — 700102 HCHG RX REV CODE 250 W/ 637 OVERRIDE(OP): Performed by: GENERAL PRACTICE

## 2021-02-21 PROCEDURE — A9270 NON-COVERED ITEM OR SERVICE: HCPCS | Performed by: PSYCHIATRY & NEUROLOGY

## 2021-02-21 PROCEDURE — A9270 NON-COVERED ITEM OR SERVICE: HCPCS | Performed by: HOSPITALIST

## 2021-02-21 PROCEDURE — 90791 PSYCH DIAGNOSTIC EVALUATION: CPT | Performed by: PSYCHOLOGIST

## 2021-02-21 PROCEDURE — 700102 HCHG RX REV CODE 250 W/ 637 OVERRIDE(OP): Performed by: PHYSICIAN ASSISTANT

## 2021-02-21 RX ADMIN — INSULIN GLARGINE 15 UNITS: 100 INJECTION, SOLUTION SUBCUTANEOUS at 17:22

## 2021-02-21 RX ADMIN — ATORVASTATIN CALCIUM 40 MG: 40 TABLET, FILM COATED ORAL at 17:25

## 2021-02-21 RX ADMIN — MIDODRINE HYDROCHLORIDE 5 MG: 5 TABLET ORAL at 08:18

## 2021-02-21 RX ADMIN — MIDODRINE HYDROCHLORIDE 5 MG: 5 TABLET ORAL at 17:24

## 2021-02-21 RX ADMIN — APIXABAN 5 MG: 5 TABLET, FILM COATED ORAL at 17:25

## 2021-02-21 RX ADMIN — LEVOTHYROXINE SODIUM 112 MCG: 0.11 TABLET ORAL at 04:03

## 2021-02-21 RX ADMIN — DIGOXIN 125 MCG: 125 TABLET ORAL at 17:25

## 2021-02-21 RX ADMIN — MODAFINIL 150 MG: 100 TABLET ORAL at 08:18

## 2021-02-21 RX ADMIN — NYSTATIN: 100000 POWDER TOPICAL at 18:00

## 2021-02-21 RX ADMIN — RISPERIDONE 0.5 MG: 0.5 TABLET ORAL at 17:25

## 2021-02-21 RX ADMIN — DOCUSATE SODIUM 50 MG AND SENNOSIDES 8.6 MG 2 TABLET: 8.6; 5 TABLET, FILM COATED ORAL at 04:03

## 2021-02-21 RX ADMIN — MIDODRINE HYDROCHLORIDE 5 MG: 5 TABLET ORAL at 21:16

## 2021-02-21 RX ADMIN — APIXABAN 5 MG: 5 TABLET, FILM COATED ORAL at 04:03

## 2021-02-21 RX ADMIN — MIDODRINE HYDROCHLORIDE 5 MG: 5 TABLET ORAL at 13:09

## 2021-02-21 RX ADMIN — NYSTATIN: 100000 POWDER TOPICAL at 04:03

## 2021-02-21 ASSESSMENT — FIBROSIS 4 INDEX: FIB4 SCORE: 1.15

## 2021-02-21 NOTE — PROGRESS NOTES
Bedside shift report received from ELISA Polk.  Safety check complete.  All patient needs met at this time.  Will continue to monitor.

## 2021-02-21 NOTE — PROGRESS NOTES
"Daily Progress Note    Date of Service: 2/21/2021  Primary Team: [facility] IM White Team   Attending: Jesús Harper M.D.   Senior Resident: Dr. Ley  Intern: Dr. Jefferson  Contact:  [phone]    ID: 56 year old male, admitted for recurrent syncope, fall, orthostatic hypotension and suicidal ideation.     Chief Complaint: \"passed out\"        Subjective:    Patient was assessed by Psych yesterday and legal hold extended.  No acute events overnight.  Patient able to ambulate with the help of his walker. Thinks he feels stronger every day. But still has SI and is hearing voices.  Awaiting placement to inpatient Psych facility.       Consultants/Specialty:  Cardiology-intereventional (Dr. Fernandez)  Psychiatry (Dr. Berger)  Psychology (Dr. Fernandez)  Behavioral Health (therapist-Susan Ewing PhD, [facility])        Review of Systems:   Review of Systems   Constitutional:  Negative for chills, fever and weight loss.   Eyes: Negative.  Negative for blurred vision and double vision.   Respiratory: Negative.  Negative for cough and shortness of breath.    Cardiovascular: Negative.  Negative for chest pain, palpitations, orthopnea and leg swelling.   Gastrointestinal: Negative.  Negative for abdominal pain, constipation, diarrhea, heartburn, nausea and vomiting.   Musculoskeletal:  Negative for joint pain and myalgias.   Skin: Negative.  Negative for rash.   Neurological: no LOC, dizziness, lightheadedness Negative for tingling, tremors, seizures, weakness and headaches.   Psychiatric/Behavioral: Positive for depression, hallucinations and suicidal ideas. Negative for substance abuse. The patient has insomnia. The patient is not nervous/anxious    Objective Data:   Physical Exam:   Vitals:   Temp:  [36.4 °C (97.5 °F)-36.6 °C (97.8 °F)] 36.6 °C (97.8 °F)  Pulse:  [64-84] 64  Resp:  [16-18] 16  BP: (104-142)/(54-87) 142/87  SpO2:  [94 %-97 %] 97 %   Physical Exam  Vitals and nursing note reviewed.   Constitutional:       General: He is " not in acute distress.     Appearance: Normal appearance. He is not ill-appearing, toxic-appearing or diaphoretic.   HENT:      Head: Normocephalic and atraumatic.   Eyes:      General:         Right eye: No discharge.         Left eye: No discharge.   Cardiovascular:      Rate and Rhythm: Normal rate and regular rhythm.      Pulses: Normal pulses.      Heart sounds: Normal heart sounds. No murmur. No friction rub. No gallop.    Pulmonary:      Effort: Pulmonary effort is normal. No respiratory distress.      Breath sounds: Normal breath sounds. No stridor. No wheezing, rhonchi or rales.   Abdominal:      General: Bowel sounds are normal. There is no distension.      Palpations: Abdomen is soft.      Tenderness: There is no abdominal tenderness.   Musculoskeletal:      Right lower leg: No edema.      Left lower leg: No edema.   Skin:     General: Skin is warm.   Neurological:      General: No focal deficit present.      Mental Status: He is alert and oriented to person, place, and time.   Psychiatric:         Attention and Perception: Attention normal.         Mood and Affect: Mood is depressed. Affect is flat.         Speech: Speech normal.         Behavior: Behavior is cooperative.         Thought Content: Thought content includes suicidal ideation. Thought content includes suicidal plan.         Labs: no new labs    Imaging: no new imaging    Problem Representation: 56 year old male with a history of  nonischemic cardiomyopathy with normal coronary angiography (2018), LVEF improved to 60% Feb 2020,  paroxysmal atrial fibrillation (CHADSVASC 3: CHF, HTN, DM), type 2 diabetes, hyperlipidemia, hypertension, previously on oral anticoagulation with Xarelto, medical noncompliance, BMI 38.57, history of orthostatic hypotension/syncope despite midodrine, Florinef, pyridostigmine, oral anticoagulation discontinued 2019 secondary to frequent falls/syncope (extensive evaluation with cardiology and neurology in the past),  recurrent MDD with psychotic features with suicidal ideation and attempt 1.5 years ago cutting himself, presented with recurrent syncope, fall and orthostatic hypotension as well as suicidal ideation to cut his wrists and is on a legal hold.      * Suicidal ideation  Assessment & Plan  Has history of MDD, recurrent with psychotic features with active SI w/ plan.  -Psychiatry following patient  -Legal Hold continued by Behavioral Health  -Patient is medical and transfer orders placed for medical floor.  -Transfer to an inpatient psychiatric facility pending.    Major depressive disorder, recurrent, severe with psychotic features (HCC)  Assessment & Plan  Improved.  -Psychiatry and Psychology following.no prolongation of QTc on most recent ECG 2/16/21.  -Per Psychiatry, continue Modafinil to target depression, and avoid SSRI's to include Prozac due to increased risk of QTc prolongation and restarted Rispderdal every evening  -Appreciate Psychiatry recommendations  -Patient medically cleared    Orthostatic hypotension- (present on admission)  Assessment & Plan  Multifactorial, but suspect mostly contributed due to autonomic dysfunction from diabetes. Other contributing factors are due to chronic venous insufficiency,medications (lasix, BB, CCB). Does not appear dehydrated. Echocardiogram shows improved EF 60% with no concerning valvular dysfunction. No history of seizures. Not hypoglycemic. Cosyntropin test with cortisol >18 which rules out adrenal insufficiency.patient ambulated today and was not orthostatic, but does have some dyspnea on exertion secondary Afib, HFrEF and nonischemic cardiomyopathy.not requiring supplemental oxygen.  -hold BB, CCB, Flomax  -Per Cardiology, no further cardiac work-up  -Midodrine adjusted per Cardiology recs  -Medically cleared    Syncope  Assessment & Plan  See orthostatic hypotension section. Does have a history of frequent falls in the past several months.    Paroxysmal atrial  fibrillation (HCC)  Assessment & Plan  CHADSVASC 3 (HTN, CHF, DM). Rate controlled. TTE Feb 2021: EF 60%,   -hold BB, CCB at this time due to bradycardia on admission and hypotension  -Cards restarted digoxin  -continue telemetry  -goal HR <110 at rest  -keep Mg>2, K>4  -previously on Xarelto and stopped 2019 due to fall risk; however, according to guidelines if has not had more than 300 falls in the past 12 months, benefit outweighs the risk to continue with chronic anticoagulation.   -start on chronic anticoagulation with Eliquis 5mg BID given lower risks of bleeding.    Chronic HFrEF (heart failure with reduced ejection fraction) ([facility])  Assessment & Plan  EF 35% on Echo 2020, improved on TTE Feb 2021 to EF 60%. BNP >4k->downtrending.   -Lisinopril, lasix and Toprol XL held at this time; may restart upon discharge    Diabetes mellitus type 2, insulin dependent ([facility])  Assessment & Plan  Controlled given co-morbidities. A1c 7.8% Nov 2020, repeat 5.2% Feb 2021.BS ranged 107-130 over the past 24 hours.  -continue home Lantus 15U qhs  -ISS  -hypoglycemic protocol  -diabetic diet  -diabetes education    Hypothyroidism- (present on admission)  Assessment & Plan  TSH wnl Feb 2021  -continue home Levothyroxine    Essential hypertension- (present on admission)  Assessment & Plan  Monitor vitals.    Lower limb amputation, great toe (HCC)- (present on admission)  Assessment & Plan  s/p right great toe amputation 2018    Elevated troponin  Assessment & Plan  Stable. Suspect due to demand ischemia. No need to trend further.    Debility- (present on admission)  Assessment & Plan  -PT/OT consults: recommend Home health PT. Ordered 4 wheeled walker with seat  -patient to ambulate three times daily out of bed on floor with assistance and out of bed to chair for meals.    Chest pain  Assessment & Plan  resolved    Chronic venous insufficiency of lower extremity  Assessment & Plan  -elevated legs  -compression stockings  -lotion,  soap/water to legs  -weight loss (BMI 38)    Colostomy present (HCC)- (present on admission)  Assessment & Plan  History of fall at home Nov 2020 on left side with perforated colon and splenic fluid collection/hematoma s/p segmental colectomy, colostomy, mobilization of the splenic flexure, wound vac, and I&D Nov 2020.   -needs follow up arranged with surgery prior to discharge  -home health ordered placed for skilled nursing for colostomy bag    Nonischemic cardiomyopathy (HCC)- (present on admission)  Assessment & Plan  Normal coronaries, 2018.Tachycardia mediated  -LVEF improved, based on echo 2/10/2021 LVEF 60%    Mixed hyperlipidemia- (present on admission)  Assessment & Plan  LDL 82 Feb 2021  -repeat lipid panel  -continue Atorvatatin    Obesity- (present on admission)  Assessment & Plan  -recommend weight loss, exercise once medically stable  -nutrition consult

## 2021-02-21 NOTE — CONSULTS
BRIEF PSYCHIATRIC CONSULT NOTE: patient seen, full note to follow.  -Legal Hold:extend as he is still SI. Continue sitter.

## 2021-02-21 NOTE — CONSULTS
"PSYCHOLOGICAL CONSULTATION:  Reason for admission: Syncope and collapse [R55]  Reason for consult: psychotherapy  Requesting Physician: Dr. Berger- team psychiatrist    Legal status: Legal Status: Involuntary    Chief Complaint: \"I'm just tired of it.\"    HPI: Sebastián Castro is a 56 y.o. male with a psychiatric history of Major Depressive Disorder, recurrent, severe, with psychotic features who presented to the hospital with syncope and hypotension. He is very well known to this writer and was seen for psychotherapy during his 12/2020 admit. Please see previous notes for more information. He is currently being followed by the team psychiatrist who last saw him on 2/19/21. See her note for more information. Consult was placed after the patient requested psychotherapy again.     Today, the patient presented with a depressed affect and reported an \"anxious\" mood. He was visibly shaking his legs and identified this as a symptom of anxiety. Patient endorsed current and active suicidal thoughts, plans, and intent. He also reported hopelessness. The patient reviewed the events that have occurred since December 2020 including issues with hypotension and his colostomy bag. These led to feelings of hopelessness and fear about his ability to physically recover. As a result, he started experiencing command auditory hallucinations (a common symptom of his when feeling depressed.) The patient shared that he currently feels \"tired\" of living with frequent thoughts that he is \"not a human\" and is \"an animal\" because he has a colostomy bag. Reviewed previous psychotherapy concepts including thought diffusion and radical acceptance. Discussed refocusing his brain on the progress he has made, such as his ability to walk which he was afraid he would not be able to do in December. The patient also identified the need to learn how to care for his colostomy bag on his own as he believes this would help with his anxiety. Agreed to " "continue to meet for psychotherapy while he is in the acute care setting.     Risk Assessment: current symptoms as reported by pt.  Suicidal Thoughts: Endorses  Plan to Commit Suicide: Endorses  Intent to Commit Suicide: Endorses  History of Suicidal Thoughts: Endorses  History of Suicide Attempts: Did not report, no history in chart  Risk Factors: current suicidality, severe mental illness, chronic illness, limited social support  Protective Factors: desire for help, willingness to accept help    Homicidal Thoughts: Denies  Plan to Hurt Others: Denies  Intent to Hurt Others: Denies  History of Homicidal Thoughts or Actions: Denies    Self-Harm Thoughts: Denies  Self-Harm Actions: Denies    Access to Guns: NO  Are Guns Locked Up?: N/A      Psychiatric Review of Systems:current symptoms as reported by pt.  Depression: Endorses see hpi  Unique: Denies   Anxiety/Panic Attacks: Endorses see hpi  PTSD symptom: Did not report, nothing in chart   Psychosis: Endorses see hpi       Medical Review of Systems: as reported by pt. All systems reviewed. Only those found to be + are noted below. All others are negative.   Neurological:    TBIs: Did not report, nothing in chart   SZs:Did not report, nothing on problem list   Strokes:Did not report, nothing on problem list    Other medical symptoms:   Thyroid:Did not report, hypothyroidism    Diabetes: Did not report, yes   Cardiovascular disease: Did not report, yes, chf    Past Psychiatric Hx: extensive. See past psychiatric notes for more information. Most recent psychiatric note on 2/2/21: \"Patient is known to our services.  He has a previous diagnosis of MDD, previously on Risperdal, modafinil and trazodone.  Per Dr. Posey, patient responded well to modafinil on December of last year.  He was on trazodone for sleep.     He has a history of being on Zoloft, Zyprexa in the past.  Zyprexa was discontinued due to metabolic syndrome.     He also has a history of functional " "neurological symptoms.\"    No changes since then.     Family Psychiatric Hx: denied    Social Hx:   Social History     Socioeconomic History   • Marital status:      Spouse name: Not on file   • Number of children: Not on file   • Years of education: Not on file   • Highest education level: Not on file   Occupational History   • Not on file   Tobacco Use   • Smoking status: Never Smoker   • Smokeless tobacco: Never Used   Substance and Sexual Activity   • Alcohol use: No   • Drug use: No   • Sexual activity: Not on file   Other Topics Concern   • Not on file   Social History Narrative   • Not on file     Social Determinants of Health     Financial Resource Strain:    • Difficulty of Paying Living Expenses:    Food Insecurity:    • Worried About Running Out of Food in the Last Year:    • Ran Out of Food in the Last Year:    Transportation Needs:    • Lack of Transportation (Medical):    • Lack of Transportation (Non-Medical):    Physical Activity:    • Days of Exercise per Week:    • Minutes of Exercise per Session:    Stress:    • Feeling of Stress :    Social Connections:    • Frequency of Communication with Friends and Family:    • Frequency of Social Gatherings with Friends and Family:    • Attends Jewish Services:    • Active Member of Clubs or Organizations:    • Attends Club or Organization Meetings:    • Marital Status:    Intimate Partner Violence:    • Fear of Current or Ex-Partner:    • Emotionally Abused:    • Physically Abused:    • Sexually Abused:         Drug/Alcohol/Tobacco Hx:   Drugs: Denies   Prescription Medication Abuse: Denies   Alcohol: Denies   Tobacco: Denies    Medical Hx: Chart reviewed. Only those findings of potential interest to psychiatry are noted below:  Past Medical History:   Diagnosis Date   • A-fib (HCC)    • Abscess 11/8/2020   • Bacteremia 11/8/2020   • Chest pain    • Congestive heart failure (HCC)    • Diabetes (HCC)    • Diabetic neuropathy (HCC)    • " Hypercholesterolemia    • Hypertension    • Longstanding persistent atrial fibrillation ([facility]) 11/8/2020   • LV dysfunction 11/8/2020   • Morbid obesity ([facility])    • NICM (nonischemic cardiomyopathy) ([facility]) 11/8/2020   • Noncompliance    • Normal cardiac stress test    • Orthostatic hypotension    • Sepsis ([facility]) 11/8/2020     Medical Conditions:     Allergies: Daptomycin, Pcn [penicillins], Unasyn [ampicillin-sulbactam sodium], and Vancomycin  Medications (currently prescribed at [facility]):    Current Facility-Administered Medications:   •  apixaban (ELIQUIS) tablet 5 mg, 5 mg, Oral, BID, Jeremías Jefferson Jr., M.D., 5 mg at 02/21/21 0403  •  modafinil (PROVIGIL) tablet 150 mg, 150 mg, Oral, QAM, Anali Berger M.D., 150 mg at 02/21/21 0818  •  nystatin (MYCOSTATIN) powder, , Topical, BID, Jeremías Jefferson Jr., M.D., Given at 02/21/21 0403  •  acetaminophen (Tylenol) tablet 650 mg, 650 mg, Oral, Q4HRS PRN, Shiv Garner M.D., 650 mg at 02/16/21 0543  •  risperiDONE (RISPERDAL) tablet 0.5 mg, 0.5 mg, Oral, Q EVENING, Anali Berger M.D., 0.5 mg at 02/20/21 1726  •  midodrine (PROAMATINE) tablet 5 mg, 5 mg, Oral, 4X/DAY, KATHRYN WeldonAVane-COOPER, 5 mg at 02/21/21 0818  •  digoxin (LANOXIN) tablet 125 mcg, 125 mcg, Oral, DAILY AT 1800, Romana Russell M.D., 125 mcg at 02/20/21 1726  •  zolpidem (AMBIEN) tablet 2.5 mg, 2.5 mg, Oral, HS PRN, Parveen Ford M.D., 2.5 mg at 02/20/21 2121  •  senna-docusate (PERICOLACE or SENOKOT S) 8.6-50 MG per tablet 2 Tab, 2 tablet, Oral, BID, 2 tablet at 02/21/21 0403 **AND** polyethylene glycol/lytes (MIRALAX) PACKET 1 Packet, 1 Packet, Oral, QDAY PRN **AND** magnesium hydroxide (MILK OF MAGNESIA) suspension 30 mL, 30 mL, Oral, QDAY PRN **AND** bisacodyl (DULCOLAX) suppository 10 mg, 10 mg, Rectal, QDAY PRN, Carmel Phillips M.D.  •  ondansetron (ZOFRAN) syringe/vial injection 4 mg, 4 mg, Intravenous, Q4HRS PRN, Carmel Phillips M.D.  •  ondansetron (ZOFRAN ODT) dispertab 4  "mg, 4 mg, Oral, Q4HRS PRN, Carmel Phillips M.D.  •  promethazine (PHENERGAN) tablet 12.5-25 mg, 12.5-25 mg, Oral, Q4HRS PRN, Carmel Phillips M.D.  •  promethazine (PHENERGAN) suppository 12.5-25 mg, 12.5-25 mg, Rectal, Q4HRS PRN, Carmel Phillips M.D.  •  prochlorperazine (COMPAZINE) injection 5-10 mg, 5-10 mg, Intravenous, Q4HRS PRN, Carmel Phillips M.D.  •  atorvastatin (LIPITOR) tablet 40 mg, 40 mg, Oral, Q EVENING, Carmel Phillips M.D., 40 mg at 02/20/21 1726  •  insulin glargine (Lantus) injection, 15 Units, Subcutaneous, Q EVENING, Carmel Phillips M.D., 15 Units at 02/20/21 1724  •  levothyroxine (SYNTHROID) tablet 112 mcg, 112 mcg, Oral, AM ES, Carmel Phillips M.D., 112 mcg at 02/21/21 0403  •  insulin regular (HumuLIN R,NovoLIN R) injection, 2-9 Units, Subcutaneous, 4X/DAY ACHS, Stopped at 02/16/21 2100 **AND** POC Blood Glucose, , , Q AC AND BEDTIME(S) **AND** NOTIFY MD and PharmD, , , Once **AND** glucose 4 g chewable tablet 16 g, 16 g, Oral, Q15 MIN PRN **AND** dextrose 50% (D50W) injection 50 mL, 50 mL, Intravenous, Q15 MIN PRN, Carmel Phillips M.D.  Labs:  Recent Results (from the past 24 hour(s))   ACCU-CHEK GLUCOSE    Collection Time: 02/20/21 11:04 AM   Result Value Ref Range    Glucose - Accu-Ck 101 (H) 65 - 99 mg/dL   ACCU-CHEK GLUCOSE    Collection Time: 02/20/21  4:48 PM   Result Value Ref Range    Glucose - Accu-Ck 99 65 - 99 mg/dL   ACCU-CHEK GLUCOSE    Collection Time: 02/20/21  8:05 PM   Result Value Ref Range    Glucose - Accu-Ck 144 (H) 65 - 99 mg/dL   ACCU-CHEK GLUCOSE    Collection Time: 02/21/21  6:03 AM   Result Value Ref Range    Glucose - Accu-Ck 117 (H) 65 - 99 mg/dL   ACCU-CHEK GLUCOSE    Collection Time: 02/21/21  7:01 AM   Result Value Ref Range    Glucose - Accu-Ck 118 (H) 65 - 99 mg/dL          Cranial Imaging Hx: MR of the brain on 2/1/21: \"1.  No evidence of acute territorial infarct, intracranial hemorrhage or mass lesion.  2.  Mild diffuse cerebral substance " "loss.  3.  Mild microangiopathic ischemic change versus demyelination or gliosis.  4.  Findings of sinusitis as described above.\"    Other:    Psychiatric Examination:  Vitals: Blood pressure 142/87, pulse 64, temperature 36.6 °C (97.8 °F), temperature source Temporal, resp. rate 16, height 1.829 m (6'), weight (!) 126 kg (277 lb 12.5 oz), SpO2 97 %.  Musculoskeletal:  Psychomotor agitation noted  Appearance and Eye Contact: somewhat disheveled, Behavior is calm, cooperative,  appropriate eye contact  Attention/Alertness: Alert  Thought Process: Linear, Logical and Goal Directed    Thought Content: No psychotic processes noted  Speech: Clear with normal rate and rhythm  Mood: \"anxious\"            Affect: depressed         SI/HI: Endorses    Memory: Recent and remote memory appear intact     Orientation: alert, oriented to person, place and time  Insight into symptoms: good  Judgement into symptoms:good    Neuropsychological Testing:   Not formally tested.          ASSESSMENT: The patient continues to endorse suicidal thoughts, plans, and intent. His mood congruent auditory hallucinations have also returned. Legal hold remains extended. Will follow the patient for brief psychotherapy while in the acute care setting.     DSM5 Diagnostic Considerations:   Major Depressive Disorder, recurrent, severe, with psychotic features       PLAN:  Legal status: Involuntary. Legal hold remains extended.   Anticipate F/U within 48 hours.   Recommend having the patient do as much of his colostomy care on his own with support so he can develop confidence and decrease anxiety about it  Records reviewed: yes  Discussed patient with other provider: yes, team psychiatrist   Will continue to follow  Thank you for the consult.    Madhuri Fernandez, Ph.D.       "

## 2021-02-21 NOTE — PROGRESS NOTES
"Assumed care of patient after bedside report with ELISA Haddad.  Pt sitting up in chair, awake and alert, no apparent distress.  Pt has sitter present.  Pt states, \"I am hearing voices that are telling me to hurt myself.\"  Pt assured that staff including sitter are here to ensure his safety.  Pt states \"Thank you.\"  Will continue to monitor.  "

## 2021-02-21 NOTE — CARE PLAN
Problem: Communication  Goal: The ability to communicate needs accurately and effectively will improve  2/20/2021 2245 by Catie Mercer R.N.  Outcome: PROGRESSING AS EXPECTED  2/20/2021 2243 by Catie Mercer R.N.  Outcome: PROGRESSING AS EXPECTED     Problem: Safety  Goal: Will remain free from injury  2/20/2021 2245 by Catie Mercer R.N.  Outcome: PROGRESSING AS EXPECTED  2/20/2021 2243 by Catie Mercer R.N.  Outcome: PROGRESSING AS EXPECTED  Goal: Will remain free from falls  2/20/2021 2245 by Catie Mercer R.N.  Outcome: PROGRESSING AS EXPECTED  2/20/2021 2243 by Catie Mercer R.N.  Outcome: PROGRESSING AS EXPECTED     Problem: Infection  Goal: Will remain free from infection  2/20/2021 2245 by Catie Mercer R.N.  Outcome: PROGRESSING AS EXPECTED  2/20/2021 2243 by Catie Mecrer R.N.  Outcome: PROGRESSING AS EXPECTED     Problem: Venous Thromboembolism (VTW)/Deep Vein Thrombosis (DVT) Prevention:  Goal: Patient will participate in Venous Thrombosis (VTE)/Deep Vein Thrombosis (DVT)Prevention Measures  2/20/2021 2245 by Catie Mercer R.N.  Outcome: PROGRESSING AS EXPECTED  2/20/2021 2243 by Catie Mercer R.N.  Outcome: PROGRESSING AS EXPECTED     Problem: Bowel/Gastric:  Goal: Normal bowel function is maintained or improved  2/20/2021 2245 by Catie Mercer R.N.  Outcome: PROGRESSING AS EXPECTED  2/20/2021 2243 by Catie Mercer R.N.  Outcome: PROGRESSING AS EXPECTED  Goal: Will not experience complications related to bowel motility  2/20/2021 2245 by Catie Mercer R.N.  Outcome: PROGRESSING AS EXPECTED  2/20/2021 2243 by Catie Mercer R.N.  Outcome: PROGRESSING AS EXPECTED     Problem: Knowledge Deficit  Goal: Knowledge of disease process/condition, treatment plan, diagnostic tests, and medications will improve  2/20/2021 2245 by Catie Mercer R.N.  Outcome: PROGRESSING AS EXPECTED  2/20/2021 2243 by Catie Mercer R.N.  Outcome: PROGRESSING AS EXPECTED  Goal:  Knowledge of the prescribed therapeutic regimen will improve  2/20/2021 2245 by Catie Mercer R.N.  Outcome: PROGRESSING AS EXPECTED  2/20/2021 2243 by Catie Mercer R.N.  Outcome: PROGRESSING AS EXPECTED     Problem: Discharge Barriers/Planning  Goal: Patient's continuum of care needs will be met  2/20/2021 2245 by Caite Mercer R.N.  Outcome: PROGRESSING AS EXPECTED  2/20/2021 2243 by Catie Mercer R.N.  Outcome: PROGRESSING AS EXPECTED     Problem: Fluid Volume:  Goal: Will maintain balanced intake and output  2/20/2021 2245 by Catie Mercer R.N.  Outcome: PROGRESSING AS EXPECTED  2/20/2021 2243 by Catie Mercer R.N.  Outcome: PROGRESSING AS EXPECTED     Problem: Psychosocial Needs:  Goal: Level of anxiety will decrease  2/20/2021 2245 by Catie Mercer R.N.  Outcome: PROGRESSING AS EXPECTED  2/20/2021 2243 by Catie Mercer R.N.  Outcome: PROGRESSING AS EXPECTED     Problem: Mobility  Goal: Risk for activity intolerance will decrease  2/20/2021 2245 by Catie Mercer R.N.  Outcome: PROGRESSING AS EXPECTED  2/20/2021 2243 by Catie Mercer R.N.  Outcome: PROGRESSING AS EXPECTED     Problem: Pain Management  Goal: Pain level will decrease to patient's comfort goal  2/20/2021 2245 by Catie Mercer R.N.  Outcome: PROGRESSING AS EXPECTED  2/20/2021 2243 by Catie Mercer R.N.  Outcome: PROGRESSING AS EXPECTED     Problem: Skin Integrity  Goal: Risk for impaired skin integrity will decrease  2/20/2021 2245 by Catie Mercer R.N.  Outcome: PROGRESSING AS EXPECTED  2/20/2021 2243 by Catie Mercer R.N.  Outcome: PROGRESSING AS EXPECTED     Problem: Potential for Self Harm  Goal: Abstinence  Outcome: PROGRESSING AS EXPECTED  Goal: No active self-harm  Outcome: PROGRESSING AS EXPECTED     Problem: Potential for Self Harm  Goal: Improved mood and functioning  Outcome: PROGRESSING SLOWER THAN EXPECTED  Goal: Achieve stable functioning  Outcome: PROGRESSING SLOWER THAN EXPECTED  Goal: No  active suicidal ideation  Outcome: PROGRESSING SLOWER THAN EXPECTED  Goal: Symptom reduction and improved functioning  Outcome: PROGRESSING SLOWER THAN EXPECTED  Goal: Effective symptom management  Outcome: PROGRESSING SLOWER THAN EXPECTED  Goal: Resolve acute exacerbation and return to psychiatric baseline  Outcome: PROGRESSING SLOWER THAN EXPECTED

## 2021-02-22 LAB
GLUCOSE BLD-MCNC: 104 MG/DL (ref 65–99)
GLUCOSE BLD-MCNC: 110 MG/DL (ref 65–99)
GLUCOSE BLD-MCNC: 124 MG/DL (ref 65–99)
GLUCOSE BLD-MCNC: 96 MG/DL (ref 65–99)

## 2021-02-22 PROCEDURE — 700102 HCHG RX REV CODE 250 W/ 637 OVERRIDE(OP): Performed by: STUDENT IN AN ORGANIZED HEALTH CARE EDUCATION/TRAINING PROGRAM

## 2021-02-22 PROCEDURE — A9270 NON-COVERED ITEM OR SERVICE: HCPCS | Performed by: GENERAL PRACTICE

## 2021-02-22 PROCEDURE — 700102 HCHG RX REV CODE 250 W/ 637 OVERRIDE(OP): Performed by: HOSPITALIST

## 2021-02-22 PROCEDURE — 700102 HCHG RX REV CODE 250 W/ 637 OVERRIDE(OP): Performed by: PSYCHIATRY & NEUROLOGY

## 2021-02-22 PROCEDURE — A9270 NON-COVERED ITEM OR SERVICE: HCPCS | Performed by: PHYSICIAN ASSISTANT

## 2021-02-22 PROCEDURE — 90837 PSYTX W PT 60 MINUTES: CPT | Performed by: PSYCHOLOGIST

## 2021-02-22 PROCEDURE — 700102 HCHG RX REV CODE 250 W/ 637 OVERRIDE(OP): Performed by: GENERAL PRACTICE

## 2021-02-22 PROCEDURE — 700102 HCHG RX REV CODE 250 W/ 637 OVERRIDE(OP): Performed by: INTERNAL MEDICINE

## 2021-02-22 PROCEDURE — 99232 SBSQ HOSP IP/OBS MODERATE 35: CPT | Performed by: PSYCHIATRY & NEUROLOGY

## 2021-02-22 PROCEDURE — A9270 NON-COVERED ITEM OR SERVICE: HCPCS | Performed by: INTERNAL MEDICINE

## 2021-02-22 PROCEDURE — A9270 NON-COVERED ITEM OR SERVICE: HCPCS | Performed by: HOSPITALIST

## 2021-02-22 PROCEDURE — 700102 HCHG RX REV CODE 250 W/ 637 OVERRIDE(OP): Performed by: PHYSICIAN ASSISTANT

## 2021-02-22 PROCEDURE — A9270 NON-COVERED ITEM OR SERVICE: HCPCS | Performed by: PSYCHIATRY & NEUROLOGY

## 2021-02-22 PROCEDURE — A9270 NON-COVERED ITEM OR SERVICE: HCPCS | Performed by: STUDENT IN AN ORGANIZED HEALTH CARE EDUCATION/TRAINING PROGRAM

## 2021-02-22 PROCEDURE — 82962 GLUCOSE BLOOD TEST: CPT

## 2021-02-22 PROCEDURE — 99231 SBSQ HOSP IP/OBS SF/LOW 25: CPT | Mod: GC | Performed by: STUDENT IN AN ORGANIZED HEALTH CARE EDUCATION/TRAINING PROGRAM

## 2021-02-22 PROCEDURE — 770001 HCHG ROOM/CARE - MED/SURG/GYN PRIV*

## 2021-02-22 RX ORDER — INSULIN GLARGINE 100 [IU]/ML
13 INJECTION, SOLUTION SUBCUTANEOUS EVERY EVENING
Status: DISCONTINUED | OUTPATIENT
Start: 2021-02-22 | End: 2021-02-27

## 2021-02-22 RX ORDER — DEXTROSE MONOHYDRATE 25 G/50ML
50 INJECTION, SOLUTION INTRAVENOUS
Status: DISCONTINUED | OUTPATIENT
Start: 2021-02-22 | End: 2021-03-09

## 2021-02-22 RX ADMIN — DIGOXIN 125 MCG: 125 TABLET ORAL at 17:39

## 2021-02-22 RX ADMIN — DOCUSATE SODIUM 50 MG AND SENNOSIDES 8.6 MG 2 TABLET: 8.6; 5 TABLET, FILM COATED ORAL at 17:39

## 2021-02-22 RX ADMIN — APIXABAN 5 MG: 5 TABLET, FILM COATED ORAL at 17:39

## 2021-02-22 RX ADMIN — MIDODRINE HYDROCHLORIDE 5 MG: 5 TABLET ORAL at 20:42

## 2021-02-22 RX ADMIN — DOCUSATE SODIUM 50 MG AND SENNOSIDES 8.6 MG 2 TABLET: 8.6; 5 TABLET, FILM COATED ORAL at 04:25

## 2021-02-22 RX ADMIN — MIDODRINE HYDROCHLORIDE 5 MG: 5 TABLET ORAL at 12:06

## 2021-02-22 RX ADMIN — NYSTATIN: 100000 POWDER TOPICAL at 17:46

## 2021-02-22 RX ADMIN — MIDODRINE HYDROCHLORIDE 5 MG: 5 TABLET ORAL at 17:39

## 2021-02-22 RX ADMIN — NYSTATIN: 100000 POWDER TOPICAL at 04:25

## 2021-02-22 RX ADMIN — ACETAMINOPHEN 650 MG: 325 TABLET, FILM COATED ORAL at 02:28

## 2021-02-22 RX ADMIN — ZOLPIDEM TARTRATE 2.5 MG: 5 TABLET ORAL at 22:12

## 2021-02-22 RX ADMIN — RISPERIDONE 0.5 MG: 0.5 TABLET ORAL at 17:39

## 2021-02-22 RX ADMIN — MODAFINIL 150 MG: 100 TABLET ORAL at 04:25

## 2021-02-22 RX ADMIN — ATORVASTATIN CALCIUM 40 MG: 40 TABLET, FILM COATED ORAL at 17:39

## 2021-02-22 RX ADMIN — APIXABAN 5 MG: 5 TABLET, FILM COATED ORAL at 04:25

## 2021-02-22 RX ADMIN — MIDODRINE HYDROCHLORIDE 5 MG: 5 TABLET ORAL at 09:15

## 2021-02-22 RX ADMIN — LEVOTHYROXINE SODIUM 112 MCG: 0.11 TABLET ORAL at 04:25

## 2021-02-22 RX ADMIN — INSULIN GLARGINE 13 UNITS: 100 INJECTION, SOLUTION SUBCUTANEOUS at 17:39

## 2021-02-22 ASSESSMENT — ENCOUNTER SYMPTOMS
DIARRHEA: 0
ABDOMINAL PAIN: 0
CHILLS: 0
MYALGIAS: 0
CONSTIPATION: 0
BACK PAIN: 0
NERVOUS/ANXIOUS: 1
SHORTNESS OF BREATH: 0
VOMITING: 0
BACK PAIN: 1
FEVER: 0
BLURRED VISION: 0
HEADACHES: 0
HALLUCINATIONS: 0
INSOMNIA: 0
PALPITATIONS: 0
DEPRESSION: 1
COUGH: 0
DIZZINESS: 0
DOUBLE VISION: 0
NAUSEA: 0
HEARTBURN: 0

## 2021-02-22 ASSESSMENT — PAIN DESCRIPTION - PAIN TYPE: TYPE: CHRONIC PAIN

## 2021-02-22 ASSESSMENT — LIFESTYLE VARIABLES: SUBSTANCE_ABUSE: 0

## 2021-02-22 NOTE — PROGRESS NOTES
"During ostomy bag change, Pt states \"I can't even look at it. \"  Pt expresses that having the ostomy \"makes me sad.\"  Pt appears anxious and depressed, but remains cooperative as nurse changes ostomy bag.  "

## 2021-02-22 NOTE — CARE PLAN
Problem: Knowledge Deficit  Goal: Knowledge of disease process/condition, treatment plan, diagnostic tests, and medications will improve  2/22/2021 0044 by Catie Mercer R.N.  Outcome: PROGRESSING SLOWER THAN EXPECTED  Goal: Knowledge of the prescribed therapeutic regimen will improve  2/22/2021 0044 by Catie Mercer R.N.  Outcome: PROGRESSING SLOWER THAN EXPECTED    Problem: Discharge Barriers/Planning  Goal: Patient's continuum of care needs will be met  2/22/2021 0044 by Catie Mercer R.N.  Outcome: PROGRESSING SLOWER THAN EXPECTED    Problem: Psychosocial Needs:  Goal: Level of anxiety will decrease  2/22/2021 0044 by Catie Mercer R.N.  Outcome: PROGRESSING SLOWER THAN EXPECTED    Problem: Potential for Self Harm  Goal: Improved mood and functioning  2/22/2021 0044 by Catie Mercer R.N.  Outcome: PROGRESSING SLOWER THAN EXPECTED  Goal: Achieve stable functioning  2/22/2021 0044 by Catie Mercer R.N.  Outcome: PROGRESSING SLOWER THAN EXPECTED  Goal: Abstinence  2/22/2021 0044 by Catie Mercer R.N.  Outcome: PROGRESSING SLOWER THAN EXPECTED  Goal: No active suicidal ideation  2/22/2021 0044 by Catie Mercer R.N.  Outcome: PROGRESSING SLOWER THAN EXPECTED  Goal: No active self-harm  2/22/2021 0044 by Catie Mercer R.N.  Outcome: PROGRESSING SLOWER THAN EXPECTED  Goal: Symptom reduction and improved functioning  2/22/2021 0044 by Catie Mercer R.N.  Outcome: PROGRESSING SLOWER THAN EXPECTED  Goal: Effective symptom management  2/22/2021 0044 by Catie Mercer R.N.  Outcome: PROGRESSING SLOWER THAN EXPECTED  Goal: Resolve acute exacerbation and return to psychiatric baseline  2/22/2021 0044 by Catie Mercer R.N.  Outcome: PROGRESSING SLOWER THAN EXPECTED       Problem: Ineffective Coping  Goal: Improved mood and functioning  2/22/2021 0044 by Catie Mercer R.N.  Outcome: PROGRESSING SLOWER THAN EXPECTED  Goal: Achieve stable functioning  2/22/2021 0044 by Catie Mercer  [facility]  Outcome: PROGRESSING SLOWER THAN EXPECTED  Goal: Abstinence  2/22/2021 0044 by Catie Mercer R.N.  Outcome: PROGRESSING SLOWER THAN EXPECTED  Goal: No active suicidal ideation  2/22/2021 0044 by Catie Mercer R.N.  Outcome: PROGRESSING SLOWER THAN EXPECTED  Goal: No active self-harm  2/22/2021 0044 by Catie Mercer R.N.  Outcome: PROGRESSING SLOWER THAN EXPECTED  Goal: Symptom reduction and improved functioning  2/22/2021 0044 by Catie Mercer R.N.  Outcome: PROGRESSING SLOWER THAN EXPECTED  Goal: Effective symptom management  2/22/2021 0044 by Catie Mercer R.N.  Outcome: PROGRESSING SLOWER THAN EXPECTED  Goal: Resolve acute exacerbation and return to psychiatric baseline  2/22/2021 0044 by Catie Mercer R.N.  Outcome: PROGRESSING SLOWER THAN EXPECTED     Problem: Communication  Goal: The ability to communicate needs accurately and effectively will improve  2/22/2021 0044 by Catie Mercer R.N.  Outcome: PROGRESSING AS EXPECTED    Problem: Communication  Goal: The ability to communicate needs accurately and effectively will improve  2/22/2021 0044 by Catie Mercer R.N.  Outcome: PROGRESSING AS EXPECTED     Problem: Safety  Goal: Will remain free from injury  2/22/2021 0044 by Catie Mercer R.N.  Outcome: PROGRESSING AS EXPECTED    Goal: Will remain free from falls  2/22/2021 0044 by Catie Mercer R.N.  Outcome: PROGRESSING AS EXPECTED    Problem: Infection  Goal: Will remain free from infection  2/22/2021 0044 by Catie Mercer R.N.  Outcome: PROGRESSING AS EXPECTED    Problem: Venous Thromboembolism (VTW)/Deep Vein Thrombosis (DVT) Prevention:  Goal: Patient will participate in Venous Thrombosis (VTE)/Deep Vein Thrombosis (DVT)Prevention Measures  2/22/2021 0044 by Catie Mercer R.N.  Outcome: PROGRESSING AS EXPECTED    Problem: Bowel/Gastric:  Goal: Normal bowel function is maintained or improved  2/22/2021 0044 by Catie Mercer R.N.  Outcome: PROGRESSING AS  EXPECTED    Goal: Will not experience complications related to bowel motility  2/22/2021 0044 by Catie Mercer R.N.  Outcome: PROGRESSING AS EXPECTED    Problem: Fluid Volume:  Goal: Will maintain balanced intake and output  2/22/2021 0044 by Catie Mercer R.N.  Outcome: PROGRESSING AS EXPECTED    Problem: Mobility  Goal: Risk for activity intolerance will decrease  2/22/2021 0044 by Catie Mercer R.N.  Outcome: PROGRESSING AS EXPECTED    Problem: Pain Management  Goal: Pain level will decrease to patient's comfort goal  2/22/2021 0044 by Catie Mercer R.N.  Outcome: PROGRESSING AS EXPECTED    Problem: Skin Integrity  Goal: Risk for impaired skin integrity will decrease  2/22/2021 0044 by Catie Mercer R.N.  Outcome: PROGRESSING AS EXPECTED

## 2021-02-22 NOTE — CONSULTS
PSYCHIATRIC FOLLOW-UP:(established)  *Reason for admission: Syncope, hypotension     *Legal Hold Status: on legal hold                *HPI: continues to feel SI, depressed, anxious.            Medical ROS (as pertinent):                        *Psychiatric Examination:   Vitals:   Vitals:    02/20/21 1635 02/20/21 2000 02/21/21 0403 02/21/21 0802   BP: 136/82 104/54 135/75 142/87   Pulse: 84 70 67 64   Resp: 16 17 18 16   Temp: 36.6 °C (97.8 °F) 36.4 °C (97.5 °F) 36.4 °C (97.6 °F) 36.6 °C (97.8 °F)   TempSrc: Temporal Temporal Temporal Temporal   SpO2: 97% 94% 96% 97%   Weight:  (!) 126 kg (277 lb 12.5 oz)     Height:         General Appearance:  good eye contact  Abnormal Movements: none   Gait and Posture: sitting in chair  Speech: soft  Thought Process: normal rate  Associations:   linear  Abnormal or Psychotic Thoughts: none  Judgement and Insight: fair  Orientation: grossly intact  Recent and Remote Memory: grossly intact  Attention Span and Concentration: intact  Language:fluent  Fund of Knowledge: not tested  Mood and Affect: depressed but interestingly looks more euthymic  SI/HI:  suicidal - yes and homicidal - no         *ASSESSMENT/RECOMENDATIONS:  1. Major depression recurrent with psychosis (the latter seems resolved)  - modafinil 150 mg  -risperdone 0.5 mg hs       -         2. Medical  -ostomy  -HTN  -pafib  -CHF  -DM2  -hypothyroidism     Legal hold: extended  Observation status: 1:1 sitter  Privileges (while on legal hold): unchanged      *Will Follow

## 2021-02-22 NOTE — CONSULTS
"PSYCHOLOGICAL FOLLOW-UP:  Reason for admission: Syncope and collapse [R55]  Length of Visit: 60min    Legal status: Legal Status: Involuntary    Chief Complaint: \"If I could just address this, then I would be ok.\" (this=colostomy bag)    HPI: Met with the patient for brief individual psychotherapy. Patient presented with an anxious affect and reported a \"anxious\" mood. Session focused on continuing to practice thought diffusion, prioritizing needs, and anxiety about colostomy bag. He denied current and active suicidal thoughts however he did endorse significant anxiety (which usually activates auditory hallucinations and SI.) He immediately reported anxiety about going to an inpatient psychiatric hospital stating that he did not know that was the plan and his belief that he does not need to go. Reflected the patient's fear and explained the legal hold process and inpatient psychiatric treatment. The patient then reported fear that his family would think he was \"crazy\" if he went. Explored that thought and practiced thought diffusion. The patient asked this writer if she would call his brother to let him know about his current situation as he was afraid to do it himself. Discussed this fear and how this writer would not be calling his brother as it would be good practice for the patient to expose himself to the fear (when he felt ready to do so.) This led to the patient expressing his belief that he would be \"ok\" if he became more comfortable with his colostomy bag which became a discussion about the fear of rejection and again facing his fear by exposing himself to his colostomy care. Finally, the patient asked for the tele-sitter stating his belief that he would be safe. This writer stated that she would discuss it further with the team psychiatrist.    Important to note, in the midst of the session, the patient reported that his son  in December. This came after this writer asked him if he still spoke to his " "nephew (who she thought he spoke to in December.) The patient did continue to speak about this and only became slightly tearful when he brought it up. Its unclear if this was the product of grief.     Also, the patient has been on a legal hold before (2 times per chart review) and it is highly unlikely that he hasnt received some explanation of the process during his previous hospitalization(s) and/or this hospitalization. Unclear if this is the product of a cognitive impairment and/or anxiety.    Psychiatric Examination:  Vitals: Blood pressure 127/76, pulse 75, temperature 36.2 °C (97.2 °F), temperature source Temporal, resp. rate 18, height 1.829 m (6'), weight (!) 126 kg (278 lb 14.1 oz), SpO2 93 %.  Musculoskeletal: psychomotor agitation, normal psychomotor activity, no tics or unusual mannerisms noted  Appearance and Eye Contact: somewhat disheveled, Behavior is anxious, cooperative,  appropriate eye contact  Attention/Alertness: Alert  Thought Process: Linear, Logical and Goal Directed    Thought Content: No psychotic processes noted  Speech: Clear with normal rate and rhythm  Mood: \"anxious\"            Affect: anxious         SI/HI: Denies    Memory: Recent and remote memory appear intact however question if there might be an issue given legal hold issue explained in HPI   Orientation: alert, oriented to person, place and time  Insight into symptoms: fair  Judgement into symptoms:fair    ASSESSMENT: While the patient denies current suicidality, at the time this note is being written, he already endorsed suicidal thoughts with a plan again to the team psychiatrist (see her note for more information.) He continues to be extremely anxious and does not have a concrete plan for how he will avoid attempting suicide. Therefore, he remains at high risk and the legal hold remains extended.       DSM5 Diagnostic Considerations:   Major Depressive Disorder, recurrent, severe, with psychotic features      PLAN:  Legal " status: Involuntary. Legal hold remains extended.   1:1 sitter  No changes to legal hold restrictions  Anticipate F/U when back on the service, 2/26/21  Records reviewed: yes  Will continue to follow  Thank you for the consult.    Madhuri Fernandez, Ph.D.

## 2021-02-22 NOTE — CONSULTS
PSYCHIATRIC FOLLOW-UP:(established)  *Reason for admission: Syncope, hypotension      *Legal Hold Status on Admission: On hold           Chart reviewed.         *HPI:    Patient continues to feel depressed, stated that it comes and goes.  Identifies his colostomy bag has the main trigger for his breast mood and suicidal thoughts.  Stated that he is having a hard time accepting it.  Patient continues to feel suicidal, and stated that if the sitter is switched to a telemetry sitter, it is very likely that he will try to hang himself.  Patient has been walking around, and doing puzzles and word search to keep his mind clear.  He continues to deny any auditory hallucinations or other psychotic symptoms.  He is participating in therapy with Dr. Fernandez.  Reported fair sleep of 6 hours and appetite.  He continues to feel anxious.        Medical ROS (as pertinent):     Review of Systems   Constitutional: Positive for malaise/fatigue.   Respiratory: Negative for cough and shortness of breath.    Cardiovascular: Negative for chest pain.   Gastrointestinal: Negative for abdominal pain, constipation, diarrhea, nausea and vomiting.   Genitourinary: Negative for dysuria.   Musculoskeletal: Positive for back pain.   Skin: Positive for rash.   Neurological: Negative for dizziness and headaches.   Psychiatric/Behavioral: Positive for depression and suicidal ideas. Negative for hallucinations. The patient is nervous/anxious. The patient does not have insomnia.            *Psychiatric Examination:  Vitals:   Vitals:    02/22/21 0757   BP: 127/76   Pulse: 75   Resp: 18   Temp: 36.2 °C (97.2 °F)   SpO2:        General Appearance: Sitting on a chair, slouched, wearing hospital gown, disheveled, calm and cooperative with fair eye contact  Abnormal Movements: None  Gait and Posture: Sitting on a chair slouched  Speech: Normal volume tone and rhythm  Thought processes: Linear  Associations: No loose associations  Abnormal or Psychotic  "Thoughts: Denies psychosis, no paranoia or delusions elicited  Judgement and Insight: Limited/limited  Orientation: Grossly oriented  Recent and Remote Memory: Intact  Attention Span and Concentration: Intact  Language: Fluid  Fund of Knowledge: Not tested  Mood and Affect:\" I am still very depressed\", depressed  SI/HI: Suicidal ideation with method and possible intention    Recent Results (from the past 72 hour(s))   ACCU-CHEK GLUCOSE    Collection Time: 02/19/21  4:45 PM   Result Value Ref Range    Glucose - Accu-Ck 120 (H) 65 - 99 mg/dL   ACCU-CHEK GLUCOSE    Collection Time: 02/19/21  9:30 PM   Result Value Ref Range    Glucose - Accu-Ck 114 (H) 65 - 99 mg/dL   ACCU-CHEK GLUCOSE    Collection Time: 02/20/21  7:27 AM   Result Value Ref Range    Glucose - Accu-Ck 115 (H) 65 - 99 mg/dL   ACCU-CHEK GLUCOSE    Collection Time: 02/20/21 11:04 AM   Result Value Ref Range    Glucose - Accu-Ck 101 (H) 65 - 99 mg/dL   ACCU-CHEK GLUCOSE    Collection Time: 02/20/21  4:48 PM   Result Value Ref Range    Glucose - Accu-Ck 99 65 - 99 mg/dL   ACCU-CHEK GLUCOSE    Collection Time: 02/20/21  8:05 PM   Result Value Ref Range    Glucose - Accu-Ck 144 (H) 65 - 99 mg/dL   ACCU-CHEK GLUCOSE    Collection Time: 02/21/21  6:03 AM   Result Value Ref Range    Glucose - Accu-Ck 117 (H) 65 - 99 mg/dL   ACCU-CHEK GLUCOSE    Collection Time: 02/21/21  7:01 AM   Result Value Ref Range    Glucose - Accu-Ck 118 (H) 65 - 99 mg/dL   ACCU-CHEK GLUCOSE    Collection Time: 02/21/21 11:19 AM   Result Value Ref Range    Glucose - Accu-Ck 107 (H) 65 - 99 mg/dL   ACCU-CHEK GLUCOSE    Collection Time: 02/21/21  4:45 PM   Result Value Ref Range    Glucose - Accu-Ck 113 (H) 65 - 99 mg/dL   ACCU-CHEK GLUCOSE    Collection Time: 02/21/21  9:19 PM   Result Value Ref Range    Glucose - Accu-Ck 134 (H) 65 - 99 mg/dL   ACCU-CHEK GLUCOSE    Collection Time: 02/22/21  5:59 AM   Result Value Ref Range    Glucose - Accu-Ck 96 65 - 99 mg/dL   ACCU-CHEK GLUCOSE    " Collection Time: 02/22/21 11:26 AM   Result Value Ref Range    Glucose - Accu-Ck 104 (H) 65 - 99 mg/dL         Assessment: Patient continues to endorse suicidal ideations.  We will continue legal hold      Dx:  MDD, recurrent, severe with psychotic features (psychosis resolved)    Medical:  Orthostatic hypotension  Syncope  Paroxysmal atrial fibrillation  Chronic heart failure  Diabetes type 2  Hypothyroidism  Essential hypertension  Debility  Chronic venous insufficiency of lower extremity  Colostomy present  Nonischemic cardiomyopathy  Mixed hyperlipidemia  Obesity      Plan:  1- Legal hold: Extended  2- Psychotropic medications: Continue current regimen  3- Please transfer pt to inpatient psychiatric hospital when medically cleared and bed is available  4-continue inpatient psychotherapy  5- Psychiatry will follow up    Thank you for the consult.     Sitter: Yes  Phone: Yes  Visitors: yes  Personal belongings: Yes

## 2021-02-22 NOTE — PROGRESS NOTES
Daily Progress Note    Date of Service: 2/22/2021  Primary Team: [facility] IM White Team   Attending: Kim Chu M.D.   Senior Resident: Dr. Ley  Intern: Dr. Bowden  Contact:  [phone]    ID: 55 yo male admitted for recurrent syncope and fall secondary to chronic orthostatic hypotension, currently on legal hold for suicidal ideation.    Chief Complaint: suicidal ideation    Subjective:  Interval Updates: Per CM, no beds available at inpatient psych facility today, may be difficult to attain. Patient still on legal hold and does still have suicidal ideation.    Mr. Castro said that he felt well this morning. Still has fleeting suicidal thoughts this morning, understood need for legal hold. He is welcome to the idea of inpatient psychiatry for further treatment. He is also agreeable to participating more in changing his colostomy bag, and hopes that this may help with his depression which he thinks is caused by having a colostomy.    Consultants/Specialty:  Psychiatry    Review of Systems:   Review of Systems   Constitutional: Negative for chills and fever.   Eyes: Negative for blurred vision and double vision.   Respiratory: Negative for cough and shortness of breath.    Cardiovascular: Negative for chest pain and palpitations.   Gastrointestinal: Negative for abdominal pain, heartburn, nausea and vomiting.   Genitourinary: Negative for dysuria and urgency.   Musculoskeletal: Negative for back pain and myalgias.   Skin: Negative for itching and rash.   Neurological: Negative for dizziness and headaches.   Psychiatric/Behavioral: Positive for depression and suicidal ideas. Negative for hallucinations and substance abuse.       Objective Data:   Physical Exam:   Vitals:   Temp:  [36 °C (96.8 °F)-36.3 °C (97.3 °F)] 36.2 °C (97.2 °F)  Pulse:  [61-85] 75  Resp:  [16-18] 18  BP: (127-152)/(73-84) 127/76  SpO2:  [92 %-97 %] 93 %    Physical Exam  Constitutional:       General: He is not in acute distress.      Appearance: Normal appearance. He is obese.   HENT:      Head: Normocephalic and atraumatic.      Right Ear: External ear normal.      Left Ear: External ear normal.      Nose: Nose normal. No congestion.      Mouth/Throat:      Mouth: Mucous membranes are moist.      Pharynx: Oropharynx is clear.   Eyes:      Extraocular Movements: Extraocular movements intact.      Pupils: Pupils are equal, round, and reactive to light.   Cardiovascular:      Rate and Rhythm: Normal rate and regular rhythm.      Heart sounds: No murmur. No friction rub. No gallop.    Pulmonary:      Effort: Pulmonary effort is normal. No respiratory distress.      Breath sounds: Normal breath sounds.   Abdominal:      General: Abdomen is flat.      Palpations: Abdomen is soft.      Tenderness: There is no abdominal tenderness. There is no guarding.      Comments: colostomy   Musculoskeletal:         General: No swelling or deformity.      Cervical back: Normal range of motion and neck supple. No rigidity.   Skin:     General: Skin is warm and dry.      Capillary Refill: Capillary refill takes less than 2 seconds.   Neurological:      General: No focal deficit present.      Mental Status: He is alert and oriented to person, place, and time.      Cranial Nerves: No cranial nerve deficit.      Motor: No weakness.      Gait: Gait abnormal (uses walker).   Psychiatric:      Comments: Tearful at times         Labs:   No results for input(s): SODIUM, POTASSIUM, CHLORIDE, CO2, GLUCOSE, BUN, CPKTOTAL in the last 72 hours.   No results for input(s): ALBUMIN, TBILIRUBIN, ALKPHOSPHAT, TOTPROTEIN, ALTSGPT, ASTSGOT, CREATININE in the last 72 hours.   No results for input(s): WBC, RBC, HEMOGLOBIN, HEMATOCRIT, MCV, MCH, RDW, PLATELETCT, MPV, NEUTSPOLYS, LYMPHOCYTES, MONOCYTES, EOSINOPHILS, BASOPHILS, RBCMORPHOLO in the last 72 hours.     Imaging:   EC-ECHOCARDIOGRAM COMPLETE W/O CONT   Final Result      DX-LUMBAR SPINE-2 OR 3 VIEWS   Final Result      Moderate  compression deformity of T11 is new compared to 2018.      Mild wedge deformity of T12 is unchanged.      Degenerative changes including facet arthropathy.      Mild retrolisthesis of L5 on S1 and L3 on L4.              Problem Representation:     57 yo male admitted for recurrent syncope and fall secondary to chronic orthostatic hypotension, currently on legal hold for suicidal ideation. Pending placement to inpatient psych facility.    * Suicidal ideation  Assessment & Plan  Has history of MDD, recurrent with psychotic features with active SI w/ plan. Patient notes that he never had any depression or SI until after his colostomy surgery. Seems to be averse to changing the bag or even looking at it.  -Psychiatry following  -Continue legal hold  -Patient is medical and transfer orders placed for medical floor.  -Transfer to an inpatient psychiatric facility pending, difficult to get a bed currently per CM    Major depressive disorder, recurrent, severe with psychotic features (HCC)  Assessment & Plan  -Psychiatry and Psychology following  -Per Psychiatry, continue modafinil to target depression, avoid SSRI's I.e fluoxetine due to increased risk of QTc prolongation  -Risperidone qhs  -Patient medically cleared    Orthostatic hypotension- (present on admission)  Assessment & Plan  Multifactorial including medications (beta blocker, CCB, tamsulosin), diabetic autonomic dysfunction, and venous insufficiency. Euvolemic. Echocardiogram shows improved EF 60% with no concerning valvular dysfunction. No history of seizures. Cosyntropin test with cortisol >18 which rules out adrenal insufficiency.  Ambulated well with walker. Echo repeated this hospitalization showed EF of 60%.  -Hold BB, CCB, tamsulosin  -Per cardiology, no further cardiac work-up needed  -Midodrine 5mg QID  -Medically clear    Syncope  Assessment & Plan  See orthostatic hypotension section. Does have a history of frequent falls in the past several  months.    Paroxysmal atrial fibrillation (HCC)  Assessment & Plan  CHADSVASC 3 (HTN, CHF, DM). Rates 60s-70s w/out beta blockade. TTE Feb 2021: EF 60%,   -Hold BB, CCB as above  -Digoxin restarted this hospitalization  -goal HR <110 at rest  -Replete Mg > 2, K > 4  -Start apixaban 5mg BID, benefits outweigh risks    Chronic HFrEF (heart failure with reduced ejection fraction) ([facility])  Assessment & Plan  EF 35% on Echo 2020, improved on TTE Feb 2021 to EF 60%. BNP >4k->downtrending.   -Lisinopril, lasix and Toprol XL held at this time; may restart upon discharge    Diabetes mellitus type 2, insulin dependent ([facility])  Assessment & Plan  Controlled given co-morbidities. A1c 7.8% Nov 2020, repeat 5.2% Feb 2021.  -Downtitrated lantus from 15u -> 13u  -ISS/hypoglycemia protocol  -Dabetic diet  -Diabetes education    Hypothyroidism- (present on admission)  Assessment & Plan  TSH wnl Feb 2021  -continue home Levothyroxine    Essential hypertension- (present on admission)  Assessment & Plan  Monitor vitals.    Lower limb amputation, great toe ([facility])- (present on admission)  Assessment & Plan  s/p right great toe amputation 2018    Elevated troponin  Assessment & Plan  RESOLVED  Stable. Suspect due to demand ischemia. No need to trend further.    Debility- (present on admission)  Assessment & Plan  -PT/OT consults: recommend Home health PT. Ordered 4 wheeled walker with seat  -patient to ambulate three times daily out of bed on floor with assistance and out of bed to chair for meals.    Chest pain  Assessment & Plan  resolved    Chronic venous insufficiency of lower extremity  Assessment & Plan  -elevated legs  -compression stockings  -lotion, soap/water to legs  -weight loss (BMI 38)    Colostomy present ([facility])- (present on admission)  Assessment & Plan  History of fall at home Nov 2020 on left side with perforated colon and splenic fluid collection/hematoma s/p segmental colectomy, colostomy, mobilization of the splenic flexure,  wound vac, and I&D Nov 2020.   -needs follow up arranged with surgery prior to discharge  -home health ordered placed for skilled nursing for colostomy bag    Nonischemic cardiomyopathy (HCC)- (present on admission)  Assessment & Plan  Normal coronaries, 2018.Tachycardia mediated  -LVEF improved, based on echo 2/10/2021 LVEF 60%    Mixed hyperlipidemia- (present on admission)  Assessment & Plan  LDL 82 Feb 2021  -repeat lipid panel  -continue Atorvatatin    Obesity- (present on admission)  Assessment & Plan  -Counseled on weight loss, exercise   -Nutrition consult      Code Status: Full  Diet: Diabetic  Lines/Tubes/Drains:  IVF: n/a  Prophylaxis:  DVT: apixaban  GI: n/a  Disposition: Inpatient psych facility

## 2021-02-22 NOTE — CARE PLAN
Problem: Communication  Goal: The ability to communicate needs accurately and effectively will improve  2/22/2021 0043 by Catie Mercer R.N.  Outcome: PROGRESSING AS EXPECTED  2/22/2021 0041 by Catie Mercer R.N.  Outcome: PROGRESSING AS EXPECTED     Problem: Safety  Goal: Will remain free from injury  2/22/2021 0043 by Catie Mercer R.N.  Outcome: PROGRESSING AS EXPECTED  2/22/2021 0041 by Catie Mercer R.N.  Outcome: PROGRESSING AS EXPECTED  Goal: Will remain free from falls  2/22/2021 0043 by Catie Mercer R.N.  Outcome: PROGRESSING AS EXPECTED  2/22/2021 0041 by Catie Mercer R.N.  Outcome: PROGRESSING AS EXPECTED     Problem: Infection  Goal: Will remain free from infection  2/22/2021 0043 by Catie Mercer R.N.  Outcome: PROGRESSING AS EXPECTED  2/22/2021 0041 by Catie Mercer R.N.  Outcome: PROGRESSING AS EXPECTED     Problem: Venous Thromboembolism (VTW)/Deep Vein Thrombosis (DVT) Prevention:  Goal: Patient will participate in Venous Thrombosis (VTE)/Deep Vein Thrombosis (DVT)Prevention Measures  2/22/2021 0043 by Catie Mercer R.N.  Outcome: PROGRESSING AS EXPECTED  2/22/2021 0041 by Catie Mercer R.N.  Outcome: PROGRESSING AS EXPECTED     Problem: Bowel/Gastric:  Goal: Normal bowel function is maintained or improved  2/22/2021 0043 by Catie Mercer R.N.  Outcome: PROGRESSING AS EXPECTED  2/22/2021 0041 by Catie Mercer R.N.  Outcome: PROGRESSING AS EXPECTED  Goal: Will not experience complications related to bowel motility  2/22/2021 0043 by Catie Mercer R.N.  Outcome: PROGRESSING AS EXPECTED  2/22/2021 0041 by Catie Mercer R.N.  Outcome: PROGRESSING AS EXPECTED     Problem: Fluid Volume:  Goal: Will maintain balanced intake and output  2/22/2021 0043 by Catie Mercer R.N.  Outcome: PROGRESSING AS EXPECTED  2/22/2021 0041 by Catie Mercer R.N.  Outcome: PROGRESSING AS EXPECTED     Problem: Mobility  Goal: Risk for activity intolerance will  decrease  2/22/2021 0043 by Catie Mercer R.N.  Outcome: PROGRESSING AS EXPECTED  2/22/2021 0041 by Catie Mercer R.N.  Outcome: PROGRESSING AS EXPECTED     Problem: Pain Management  Goal: Pain level will decrease to patient's comfort goal  2/22/2021 0043 by Catie Mercer R.N.  Outcome: PROGRESSING AS EXPECTED  2/22/2021 0041 by Catie Mercer R.N.  Outcome: PROGRESSING AS EXPECTED     Problem: Skin Integrity  Goal: Risk for impaired skin integrity will decrease  2/22/2021 0043 by Catie Mercer R.N.  Outcome: PROGRESSING AS EXPECTED  2/22/2021 0041 by Catie Mercer R.N.  Outcome: PROGRESSING AS EXPECTED     Problem: Knowledge Deficit  Goal: Knowledge of disease process/condition, treatment plan, diagnostic tests, and medications will improve  2/22/2021 0043 by Catie Mercer R.N.  Outcome: PROGRESSING SLOWER THAN EXPECTED  2/22/2021 0041 by Catie Mercer R.N.  Outcome: PROGRESSING AS EXPECTED  Goal: Knowledge of the prescribed therapeutic regimen will improve  2/22/2021 0043 by Catie Mercer R.N.  Outcome: PROGRESSING SLOWER THAN EXPECTED  2/22/2021 0041 by Catie Mercer R.N.  Outcome: PROGRESSING AS EXPECTED     Problem: Discharge Barriers/Planning  Goal: Patient's continuum of care needs will be met  2/22/2021 0043 by Catie Mercer R.N.  Outcome: PROGRESSING SLOWER THAN EXPECTED  2/22/2021 0041 by Catie Mercer R.N.  Outcome: PROGRESSING SLOWER THAN EXPECTED     Problem: Psychosocial Needs:  Goal: Level of anxiety will decrease  2/22/2021 0043 by Catie Mercer R.N.  Outcome: PROGRESSING SLOWER THAN EXPECTED  2/22/2021 0041 by Catie Mercer R.N.  Outcome: PROGRESSING AS EXPECTED     Problem: Potential for Self Harm  Goal: Improved mood and functioning  2/22/2021 0043 by Catie Mercer R.N.  Outcome: PROGRESSING SLOWER THAN EXPECTED  2/22/2021 0041 by Catie Mercer R.N.  Outcome: PROGRESSING SLOWER THAN EXPECTED  Goal: Achieve stable functioning  2/22/2021 0043 by Catie  PADMINI Mercer.  Outcome: PROGRESSING SLOWER THAN EXPECTED  2/22/2021 0041 by Catie Mercer R.N.  Outcome: PROGRESSING SLOWER THAN EXPECTED  Goal: Abstinence  2/22/2021 0043 by Catie Mercer R.N.  Outcome: PROGRESSING SLOWER THAN EXPECTED  2/22/2021 0041 by Catie Mercer R.N.  Outcome: PROGRESSING SLOWER THAN EXPECTED  Goal: No active suicidal ideation  2/22/2021 0043 by Catie Mercer R.N.  Outcome: PROGRESSING SLOWER THAN EXPECTED  2/22/2021 0041 by GERARD ArtisN.  Outcome: PROGRESSING SLOWER THAN EXPECTED  Goal: No active self-harm  2/22/2021 0043 by Catie Mercer R.N.  Outcome: PROGRESSING SLOWER THAN EXPECTED  2/22/2021 0041 by Catie Mercer R.N.  Outcome: PROGRESSING SLOWER THAN EXPECTED  Goal: Symptom reduction and improved functioning  2/22/2021 0043 by Catie Mercer R.N.  Outcome: PROGRESSING SLOWER THAN EXPECTED  2/22/2021 0041 by Catie Mercer R.N.  Outcome: PROGRESSING SLOWER THAN EXPECTED  Goal: Effective symptom management  2/22/2021 0043 by Catie Mercer R.N.  Outcome: PROGRESSING SLOWER THAN EXPECTED  2/22/2021 0041 by Catie Mercer R.N.  Outcome: PROGRESSING SLOWER THAN EXPECTED  Goal: Resolve acute exacerbation and return to psychiatric baseline  2/22/2021 0043 by Catie Mercer R.N.  Outcome: PROGRESSING SLOWER THAN EXPECTED  2/22/2021 0041 by Catie Mercer R.N.  Outcome: PROGRESSING SLOWER THAN EXPECTED     Problem: Ineffective Coping  Goal: Improved mood and functioning  2/22/2021 0043 by Catie Mercer R.N.  Outcome: PROGRESSING SLOWER THAN EXPECTED  2/22/2021 0041 by Catie Mercer R.N.  Outcome: PROGRESSING SLOWER THAN EXPECTED  Goal: Achieve stable functioning  2/22/2021 0043 by Catie Mercer R.N.  Outcome: PROGRESSING SLOWER THAN EXPECTED  2/22/2021 0041 by Catie Mercer R.N.  Outcome: PROGRESSING SLOWER THAN EXPECTED  Goal: Abstinence  2/22/2021 0043 by Catie Mercer R.N.  Outcome: PROGRESSING SLOWER THAN EXPECTED  2/22/2021 0041 by  Catie Mercer R.N.  Outcome: PROGRESSING SLOWER THAN EXPECTED  Goal: No active suicidal ideation  2/22/2021 0043 by Catie Mercer R.N.  Outcome: PROGRESSING SLOWER THAN EXPECTED  2/22/2021 0041 by Catie Mercer R.N.  Outcome: PROGRESSING SLOWER THAN EXPECTED  Goal: No active self-harm  2/22/2021 0043 by Catie Mercer R.N.  Outcome: PROGRESSING SLOWER THAN EXPECTED  2/22/2021 0041 by Catie Mercer R.N.  Outcome: PROGRESSING SLOWER THAN EXPECTED  Goal: Symptom reduction and improved functioning  2/22/2021 0043 by Catie Mercer R.N.  Outcome: PROGRESSING SLOWER THAN EXPECTED  2/22/2021 0041 by Catie Mercer R.N.  Outcome: PROGRESSING SLOWER THAN EXPECTED  Goal: Effective symptom management  2/22/2021 0043 by Catie Mercer R.N.  Outcome: PROGRESSING SLOWER THAN EXPECTED  2/22/2021 0041 by Catie Mercer R.N.  Outcome: PROGRESSING SLOWER THAN EXPECTED  Goal: Resolve acute exacerbation and return to psychiatric baseline  2/22/2021 0043 by Catie Mercer R.N.  Outcome: PROGRESSING SLOWER THAN EXPECTED  2/22/2021 0041 by Catie Mercer R.N.  Outcome: PROGRESSING SLOWER THAN EXPECTED

## 2021-02-23 LAB
ANION GAP SERPL CALC-SCNC: 10 MMOL/L (ref 7–16)
BASOPHILS # BLD AUTO: 0.9 % (ref 0–1.8)
BASOPHILS # BLD: 0.08 K/UL (ref 0–0.12)
BUN SERPL-MCNC: 25 MG/DL (ref 8–22)
CALCIUM SERPL-MCNC: 9.8 MG/DL (ref 8.5–10.5)
CHLORIDE SERPL-SCNC: 101 MMOL/L (ref 96–112)
CO2 SERPL-SCNC: 24 MMOL/L (ref 20–33)
CREAT SERPL-MCNC: 1.13 MG/DL (ref 0.5–1.4)
EOSINOPHIL # BLD AUTO: 0.32 K/UL (ref 0–0.51)
EOSINOPHIL NFR BLD: 3.5 % (ref 0–6.9)
ERYTHROCYTE [DISTWIDTH] IN BLOOD BY AUTOMATED COUNT: 47.2 FL (ref 35.9–50)
GLUCOSE BLD-MCNC: 115 MG/DL (ref 65–99)
GLUCOSE BLD-MCNC: 116 MG/DL (ref 65–99)
GLUCOSE BLD-MCNC: 123 MG/DL (ref 65–99)
GLUCOSE BLD-MCNC: 142 MG/DL (ref 65–99)
GLUCOSE SERPL-MCNC: 98 MG/DL (ref 65–99)
HCT VFR BLD AUTO: 44.2 % (ref 42–52)
HGB BLD-MCNC: 14.1 G/DL (ref 14–18)
IMM GRANULOCYTES # BLD AUTO: 0.03 K/UL (ref 0–0.11)
IMM GRANULOCYTES NFR BLD AUTO: 0.3 % (ref 0–0.9)
LYMPHOCYTES # BLD AUTO: 2.69 K/UL (ref 1–4.8)
LYMPHOCYTES NFR BLD: 29.1 % (ref 22–41)
MAGNESIUM SERPL-MCNC: 1.8 MG/DL (ref 1.5–2.5)
MCH RBC QN AUTO: 28.8 PG (ref 27–33)
MCHC RBC AUTO-ENTMCNC: 31.9 G/DL (ref 33.7–35.3)
MCV RBC AUTO: 90.4 FL (ref 81.4–97.8)
MONOCYTES # BLD AUTO: 0.76 K/UL (ref 0–0.85)
MONOCYTES NFR BLD AUTO: 8.2 % (ref 0–13.4)
NEUTROPHILS # BLD AUTO: 5.36 K/UL (ref 1.82–7.42)
NEUTROPHILS NFR BLD: 58 % (ref 44–72)
NRBC # BLD AUTO: 0 K/UL
NRBC BLD-RTO: 0 /100 WBC
PLATELET # BLD AUTO: 119 K/UL (ref 164–446)
PMV BLD AUTO: 12.3 FL (ref 9–12.9)
POTASSIUM SERPL-SCNC: 4.1 MMOL/L (ref 3.6–5.5)
RBC # BLD AUTO: 4.89 M/UL (ref 4.7–6.1)
SODIUM SERPL-SCNC: 135 MMOL/L (ref 135–145)
WBC # BLD AUTO: 9.2 K/UL (ref 4.8–10.8)

## 2021-02-23 PROCEDURE — 80048 BASIC METABOLIC PNL TOTAL CA: CPT

## 2021-02-23 PROCEDURE — A9270 NON-COVERED ITEM OR SERVICE: HCPCS | Performed by: HOSPITALIST

## 2021-02-23 PROCEDURE — 700102 HCHG RX REV CODE 250 W/ 637 OVERRIDE(OP): Performed by: HOSPITALIST

## 2021-02-23 PROCEDURE — 36415 COLL VENOUS BLD VENIPUNCTURE: CPT

## 2021-02-23 PROCEDURE — 82962 GLUCOSE BLOOD TEST: CPT | Mod: 91

## 2021-02-23 PROCEDURE — 700102 HCHG RX REV CODE 250 W/ 637 OVERRIDE(OP): Performed by: PSYCHIATRY & NEUROLOGY

## 2021-02-23 PROCEDURE — 83735 ASSAY OF MAGNESIUM: CPT

## 2021-02-23 PROCEDURE — A9270 NON-COVERED ITEM OR SERVICE: HCPCS | Performed by: INTERNAL MEDICINE

## 2021-02-23 PROCEDURE — 302102 BAG OST N IMG 2.25IN 2PC (FECAL): Performed by: STUDENT IN AN ORGANIZED HEALTH CARE EDUCATION/TRAINING PROGRAM

## 2021-02-23 PROCEDURE — A9270 NON-COVERED ITEM OR SERVICE: HCPCS | Performed by: GENERAL PRACTICE

## 2021-02-23 PROCEDURE — 700102 HCHG RX REV CODE 250 W/ 637 OVERRIDE(OP): Performed by: GENERAL PRACTICE

## 2021-02-23 PROCEDURE — 770001 HCHG ROOM/CARE - MED/SURG/GYN PRIV*

## 2021-02-23 PROCEDURE — 700102 HCHG RX REV CODE 250 W/ 637 OVERRIDE(OP): Performed by: INTERNAL MEDICINE

## 2021-02-23 PROCEDURE — 700102 HCHG RX REV CODE 250 W/ 637 OVERRIDE(OP): Performed by: PHYSICIAN ASSISTANT

## 2021-02-23 PROCEDURE — A9270 NON-COVERED ITEM OR SERVICE: HCPCS | Performed by: PSYCHIATRY & NEUROLOGY

## 2021-02-23 PROCEDURE — A9270 NON-COVERED ITEM OR SERVICE: HCPCS | Performed by: PHYSICIAN ASSISTANT

## 2021-02-23 PROCEDURE — 85025 COMPLETE CBC W/AUTO DIFF WBC: CPT

## 2021-02-23 PROCEDURE — 99231 SBSQ HOSP IP/OBS SF/LOW 25: CPT | Mod: GC | Performed by: STUDENT IN AN ORGANIZED HEALTH CARE EDUCATION/TRAINING PROGRAM

## 2021-02-23 RX ADMIN — DIGOXIN 125 MCG: 125 TABLET ORAL at 17:26

## 2021-02-23 RX ADMIN — RISPERIDONE 0.5 MG: 0.5 TABLET ORAL at 17:26

## 2021-02-23 RX ADMIN — LEVOTHYROXINE SODIUM 112 MCG: 0.11 TABLET ORAL at 06:23

## 2021-02-23 RX ADMIN — APIXABAN 5 MG: 5 TABLET, FILM COATED ORAL at 17:26

## 2021-02-23 RX ADMIN — MODAFINIL 150 MG: 100 TABLET ORAL at 06:24

## 2021-02-23 RX ADMIN — DOCUSATE SODIUM 50 MG AND SENNOSIDES 8.6 MG 2 TABLET: 8.6; 5 TABLET, FILM COATED ORAL at 06:23

## 2021-02-23 RX ADMIN — MIDODRINE HYDROCHLORIDE 5 MG: 5 TABLET ORAL at 14:06

## 2021-02-23 RX ADMIN — INSULIN GLARGINE 13 UNITS: 100 INJECTION, SOLUTION SUBCUTANEOUS at 17:29

## 2021-02-23 RX ADMIN — NYSTATIN: 100000 POWDER TOPICAL at 06:24

## 2021-02-23 RX ADMIN — ATORVASTATIN CALCIUM 40 MG: 40 TABLET, FILM COATED ORAL at 17:26

## 2021-02-23 RX ADMIN — DOCUSATE SODIUM 50 MG AND SENNOSIDES 8.6 MG 2 TABLET: 8.6; 5 TABLET, FILM COATED ORAL at 17:26

## 2021-02-23 RX ADMIN — NYSTATIN: 100000 POWDER TOPICAL at 17:28

## 2021-02-23 RX ADMIN — APIXABAN 5 MG: 5 TABLET, FILM COATED ORAL at 06:23

## 2021-02-23 ASSESSMENT — ENCOUNTER SYMPTOMS
HEADACHES: 0
ABDOMINAL PAIN: 0
FEVER: 0
MYALGIAS: 0
VOMITING: 0
BLURRED VISION: 0
CHILLS: 0
SHORTNESS OF BREATH: 0
BACK PAIN: 0
HEARTBURN: 0
HALLUCINATIONS: 0
NAUSEA: 0
DIZZINESS: 0
DEPRESSION: 1
PALPITATIONS: 0
DOUBLE VISION: 0
COUGH: 0

## 2021-02-23 ASSESSMENT — LIFESTYLE VARIABLES: SUBSTANCE_ABUSE: 0

## 2021-02-23 NOTE — DISCHARGE PLANNING
Legal Hold     Referral: Legal Hold Court     Intervention: Pt presented for legal hold meeting with  via video conferencing to discuss legal options of contesting hold and meeting with the court doctors tomorrow afternoon, or not contesting legal hold and continuing the hold for one week.  advised that pt's legal hold will be be continued for one week (3/4).       Plan: Pt's legal hold has been continued for one week. Pt awaiting transfer to an in patient psych facility.

## 2021-02-23 NOTE — PROGRESS NOTES
Daily Progress Note    Date of Service: 2/23/2021  Primary Team: [facility] IM White Team   Attending: Kim Chu M.D.   Senior Resident: Dr. Ley  Intern: Dr. Bowden  Contact:  [phone]    ID: 57 yo male admitted for recurrent syncope and fall secondary to chronic orthostatic hypotension, currently on legal hold for suicidal ideation.    Chief Complaint: suicidal ideation    Subjective:  Interval Updates: Legal hold extended, needs placement to inpatient psychiatry facility    Mr. Castro said that he felt fine this morning. Continues to have occasional fleeting thoughts of suicidal intent and self-harm. Expressed understanding re:legal hold. He is still welcome to the idea of inpatient psychiatry for further treatment. He is also agreeable to participating more in changing his colostomy bag. He says he enjoys watching TV and it helping with his mood.    Consultants/Specialty:  Psychiatry    Review of Systems:   Review of Systems   Constitutional: Negative for chills and fever.   Eyes: Negative for blurred vision and double vision.   Respiratory: Negative for cough and shortness of breath.    Cardiovascular: Negative for chest pain and palpitations.   Gastrointestinal: Negative for abdominal pain, heartburn, nausea and vomiting.   Genitourinary: Negative for dysuria and urgency.   Musculoskeletal: Negative for back pain and myalgias.   Skin: Negative for itching and rash.   Neurological: Negative for dizziness and headaches.   Psychiatric/Behavioral: Positive for depression and suicidal ideas. Negative for hallucinations and substance abuse.       Objective Data:   Physical Exam:   Vitals:   Temp:  [36.1 °C (96.9 °F)-36.7 °C (98 °F)] 36.1 °C (96.9 °F)  Pulse:  [] 109  Resp:  [16-18] 16  BP: (135-154)/() 145/103  SpO2:  [93 %-97 %] 97 %    Physical Exam  Constitutional:       General: He is not in acute distress.     Appearance: Normal appearance. He is obese.   HENT:      Head: Normocephalic and  atraumatic.      Right Ear: External ear normal.      Left Ear: External ear normal.      Nose: Nose normal. No congestion.      Mouth/Throat:      Mouth: Mucous membranes are moist.      Pharynx: Oropharynx is clear.   Eyes:      Extraocular Movements: Extraocular movements intact.      Pupils: Pupils are equal, round, and reactive to light.   Cardiovascular:      Rate and Rhythm: Normal rate and regular rhythm.      Heart sounds: No murmur. No friction rub. No gallop.    Pulmonary:      Effort: Pulmonary effort is normal. No respiratory distress.      Breath sounds: Normal breath sounds.   Abdominal:      General: Abdomen is flat.      Palpations: Abdomen is soft.      Tenderness: There is no abdominal tenderness. There is no guarding.      Comments: colostomy   Musculoskeletal:         General: No swelling or deformity.      Cervical back: Normal range of motion and neck supple. No rigidity.   Skin:     General: Skin is warm and dry.      Capillary Refill: Capillary refill takes less than 2 seconds.   Neurological:      General: No focal deficit present.      Mental Status: He is alert and oriented to person, place, and time.      Cranial Nerves: No cranial nerve deficit.      Motor: No weakness.      Gait: Gait abnormal (uses walker).   Psychiatric:      Comments: Tearful at times         Labs:   Recent Labs     02/23/21  0321   SODIUM 135   POTASSIUM 4.1   CHLORIDE 101   CO2 24   GLUCOSE 98   BUN 25*      Recent Labs     02/23/21  0321   CREATININE 1.13      Recent Labs     02/23/21  0321   WBC 9.2   RBC 4.89   HEMOGLOBIN 14.1   HEMATOCRIT 44.2   MCV 90.4   MCH 28.8   RDW 47.2   PLATELETCT 119*   MPV 12.3   NEUTSPOLYS 58.00   LYMPHOCYTES 29.10   MONOCYTES 8.20   EOSINOPHILS 3.50   BASOPHILS 0.90        Imaging:   EC-ECHOCARDIOGRAM COMPLETE W/O CONT   Final Result      DX-LUMBAR SPINE-2 OR 3 VIEWS   Final Result      Moderate compression deformity of T11 is new compared to 2018.      Mild wedge deformity of T12  is unchanged.      Degenerative changes including facet arthropathy.      Mild retrolisthesis of L5 on S1 and L3 on L4.              Problem Representation:     57 yo male admitted for recurrent syncope and fall secondary to chronic orthostatic hypotension, currently on legal hold for suicidal ideation. Pending placement to inpatient psych facility.    * Suicidal ideation  Assessment & Plan  Has history of MDD, recurrent with psychotic features with active SI w/ plan. Patient notes that he never had any depression or SI until after his colostomy surgery. Seems to be averse to changing the bag or even looking at it.  -Psychiatry following  -Continue legal hold  -Patient is medical and transfer orders placed for medical floor.  -Transfer to an inpatient psychiatric facility pending, difficult to get a bed currently per CM    Major depressive disorder, recurrent, severe with psychotic features (HCC)  Assessment & Plan  -Psychiatry and Psychology following  -Per Psychiatry, continue modafinil to target depression, avoid SSRI's I.e fluoxetine due to increased risk of QTc prolongation  -Risperidone qhs  -Patient medically cleared    Orthostatic hypotension- (present on admission)  Assessment & Plan  Multifactorial including medications (beta blocker, CCB, tamsulosin), diabetic autonomic dysfunction, and venous insufficiency. Euvolemic. Echocardiogram shows improved EF 60% with no concerning valvular dysfunction. No history of seizures. Cosyntropin test with cortisol >18 which rules out adrenal insufficiency.  Ambulated well with walker. Echo repeated this hospitalization showed EF of 60%.  -Hold BB, CCB, tamsulosin  -Per cardiology, no further cardiac work-up needed  -Midodrine 5mg QID  -Medically clear    Syncope  Assessment & Plan  See orthostatic hypotension section. Does have a history of frequent falls in the past several months.    Paroxysmal atrial fibrillation (HCC)  Assessment & Plan  CHADSVASC 3 (HTN, CHF, DM).  Rates 60s-70s w/out beta blockade. TTE Feb 2021: EF 60%,   -Hold BB, CCB as above  -Digoxin restarted this hospitalization  -goal HR <110 at rest  -Replete Mg > 2, K > 4  -Start apixaban 5mg BID, benefits outweigh risks    Chronic HFrEF (heart failure with reduced ejection fraction) ([facility])  Assessment & Plan  EF 35% on Echo 2020, improved on TTE Feb 2021 to EF 60%. BNP >4k->downtrending.   -Lisinopril, lasix and Toprol XL held at this time; may restart upon discharge    Diabetes mellitus type 2, insulin dependent ([facility])  Assessment & Plan  Controlled given co-morbidities. A1c 7.8% Nov 2020, repeat 5.2% Feb 2021.  -Lantus 13u  -ISS/hypoglycemia protocol  -Dabetic diet  -Diabetes education    Hypothyroidism- (present on admission)  Assessment & Plan  TSH wnl Feb 2021  -continue home Levothyroxine    Essential hypertension- (present on admission)  Assessment & Plan  Monitor vitals.    Lower limb amputation, great toe ([facility])- (present on admission)  Assessment & Plan  s/p right great toe amputation 2018    Elevated troponin  Assessment & Plan  RESOLVED  Stable. Suspect due to demand ischemia. No need to trend further.    Debility- (present on admission)  Assessment & Plan  -PT/OT consults: recommend Home health PT. Ordered 4 wheeled walker with seat  -patient to ambulate three times daily out of bed on floor with assistance and out of bed to chair for meals.    Chest pain  Assessment & Plan  resolved    Chronic venous insufficiency of lower extremity  Assessment & Plan  -elevated legs  -compression stockings  -lotion, soap/water to legs  -weight loss (BMI 38)    Colostomy present ([facility])- (present on admission)  Assessment & Plan  History of fall at home Nov 2020 on left side with perforated colon and splenic fluid collection/hematoma s/p segmental colectomy, colostomy, mobilization of the splenic flexure, wound vac, and I&D Nov 2020.   -needs follow up arranged with surgery prior to discharge  -home health ordered placed  for skilled nursing for colostomy bag    Nonischemic cardiomyopathy (HCC)- (present on admission)  Assessment & Plan  Normal coronaries, 2018.Tachycardia mediated  -LVEF improved, based on echo 2/10/2021 LVEF 60%    Mixed hyperlipidemia- (present on admission)  Assessment & Plan  LDL 82 Feb 2021  -repeat lipid panel  -continue Atorvatatin    Obesity- (present on admission)  Assessment & Plan  -Counseled on weight loss, exercise   -Nutrition consult      Code Status: Full  Diet: Diabetic  Lines/Tubes/Drains:  IVF: n/a  Prophylaxis:  DVT: apixaban  GI: n/a  Disposition: Inpatient psych facility

## 2021-02-23 NOTE — PROGRESS NOTES
Assumed care for patient. Bedside reports done with Jillian PÉREZ. Pt resting inbed, sitter present at all times.

## 2021-02-23 NOTE — DISCHARGE PLANNING
Anticipated Discharge Disposition: inpatient psych    Action: Patient discussed in IDT rounds. Patient still medically clear. Patient is pending acceptance at [facility]. Patient has been declined by all other facilities. Patient is participating more in ostomy care to gain independence in that.     Barriers to Discharge: ostomy, [place] med[place] insurance    Plan: LSW to follow up on referral with [facility]

## 2021-02-23 NOTE — CARE PLAN
Problem: Communication  Goal: The ability to communicate needs accurately and effectively will improve  Outcome: PROGRESSING AS EXPECTED     Problem: Safety  Goal: Will remain free from injury  Outcome: PROGRESSING AS EXPECTED  Note: Continued suicide precautions. Pt remains free of injury during hospital stay. Continue to apply appropriate fall precautions based on risk and educate patient on general falls precautions, use of call light. Utilize bed alarm or sitter if needed. 1:1 sitter at bedside at all times.      Problem: Psychosocial Needs:  Goal: Level of anxiety will decrease  Outcome: PROGRESSING AS EXPECTED  Note: Pt reports anxiety is at a good level. Continue to provide relaxation techniques and distractions, administer ordered meds.

## 2021-02-24 LAB
GLUCOSE BLD-MCNC: 104 MG/DL (ref 65–99)
GLUCOSE BLD-MCNC: 144 MG/DL (ref 65–99)
GLUCOSE BLD-MCNC: 148 MG/DL (ref 65–99)

## 2021-02-24 PROCEDURE — A9270 NON-COVERED ITEM OR SERVICE: HCPCS | Performed by: INTERNAL MEDICINE

## 2021-02-24 PROCEDURE — A9270 NON-COVERED ITEM OR SERVICE: HCPCS | Performed by: GENERAL PRACTICE

## 2021-02-24 PROCEDURE — A9270 NON-COVERED ITEM OR SERVICE: HCPCS | Performed by: PSYCHIATRY & NEUROLOGY

## 2021-02-24 PROCEDURE — 99231 SBSQ HOSP IP/OBS SF/LOW 25: CPT | Mod: GC | Performed by: STUDENT IN AN ORGANIZED HEALTH CARE EDUCATION/TRAINING PROGRAM

## 2021-02-24 PROCEDURE — 700102 HCHG RX REV CODE 250 W/ 637 OVERRIDE(OP): Performed by: PSYCHIATRY & NEUROLOGY

## 2021-02-24 PROCEDURE — 82962 GLUCOSE BLOOD TEST: CPT | Mod: 91

## 2021-02-24 PROCEDURE — 302102 BAG OST N IMG 2.25IN 2PC (FECAL): Performed by: INTERNAL MEDICINE

## 2021-02-24 PROCEDURE — A9270 NON-COVERED ITEM OR SERVICE: HCPCS | Performed by: HOSPITALIST

## 2021-02-24 PROCEDURE — 302111 WAFER OST 2.25IN N IMG RD 2 PC (BARRIER): Performed by: INTERNAL MEDICINE

## 2021-02-24 PROCEDURE — 770001 HCHG ROOM/CARE - MED/SURG/GYN PRIV*

## 2021-02-24 PROCEDURE — 700102 HCHG RX REV CODE 250 W/ 637 OVERRIDE(OP): Performed by: INTERNAL MEDICINE

## 2021-02-24 PROCEDURE — A9270 NON-COVERED ITEM OR SERVICE: HCPCS | Performed by: PHYSICIAN ASSISTANT

## 2021-02-24 PROCEDURE — 700102 HCHG RX REV CODE 250 W/ 637 OVERRIDE(OP): Performed by: PHYSICIAN ASSISTANT

## 2021-02-24 PROCEDURE — 302098 PASTE RING (FLAT): Performed by: INTERNAL MEDICINE

## 2021-02-24 PROCEDURE — 700102 HCHG RX REV CODE 250 W/ 637 OVERRIDE(OP): Performed by: HOSPITALIST

## 2021-02-24 PROCEDURE — 700102 HCHG RX REV CODE 250 W/ 637 OVERRIDE(OP): Performed by: GENERAL PRACTICE

## 2021-02-24 RX ADMIN — MIDODRINE HYDROCHLORIDE 5 MG: 5 TABLET ORAL at 12:07

## 2021-02-24 RX ADMIN — ATORVASTATIN CALCIUM 40 MG: 40 TABLET, FILM COATED ORAL at 17:52

## 2021-02-24 RX ADMIN — DOCUSATE SODIUM 50 MG AND SENNOSIDES 8.6 MG 2 TABLET: 8.6; 5 TABLET, FILM COATED ORAL at 04:44

## 2021-02-24 RX ADMIN — NYSTATIN: 100000 POWDER TOPICAL at 04:44

## 2021-02-24 RX ADMIN — INSULIN GLARGINE 13 UNITS: 100 INJECTION, SOLUTION SUBCUTANEOUS at 17:53

## 2021-02-24 RX ADMIN — MODAFINIL 150 MG: 100 TABLET ORAL at 04:44

## 2021-02-24 RX ADMIN — APIXABAN 5 MG: 5 TABLET, FILM COATED ORAL at 04:44

## 2021-02-24 RX ADMIN — MIDODRINE HYDROCHLORIDE 5 MG: 5 TABLET ORAL at 22:01

## 2021-02-24 RX ADMIN — MIDODRINE HYDROCHLORIDE 5 MG: 5 TABLET ORAL at 04:56

## 2021-02-24 RX ADMIN — DIGOXIN 125 MCG: 125 TABLET ORAL at 17:52

## 2021-02-24 RX ADMIN — ZOLPIDEM TARTRATE 2.5 MG: 5 TABLET ORAL at 22:01

## 2021-02-24 RX ADMIN — ZOLPIDEM TARTRATE 2.5 MG: 5 TABLET ORAL at 00:30

## 2021-02-24 RX ADMIN — LEVOTHYROXINE SODIUM 112 MCG: 0.11 TABLET ORAL at 04:44

## 2021-02-24 RX ADMIN — RISPERIDONE 0.5 MG: 0.5 TABLET ORAL at 17:52

## 2021-02-24 RX ADMIN — APIXABAN 5 MG: 5 TABLET, FILM COATED ORAL at 17:52

## 2021-02-24 RX ADMIN — NYSTATIN: 100000 POWDER TOPICAL at 17:52

## 2021-02-24 RX ADMIN — DOCUSATE SODIUM 50 MG AND SENNOSIDES 8.6 MG 2 TABLET: 8.6; 5 TABLET, FILM COATED ORAL at 17:52

## 2021-02-24 ASSESSMENT — ENCOUNTER SYMPTOMS
DEPRESSION: 1
DIZZINESS: 0
HEADACHES: 0
VOMITING: 0
HEARTBURN: 0
HALLUCINATIONS: 0
BACK PAIN: 0
ABDOMINAL PAIN: 0
COUGH: 0
CHILLS: 0
NAUSEA: 0
MYALGIAS: 0
DOUBLE VISION: 0
BLURRED VISION: 0
PALPITATIONS: 0
FEVER: 0
SHORTNESS OF BREATH: 0

## 2021-02-24 ASSESSMENT — LIFESTYLE VARIABLES: SUBSTANCE_ABUSE: 0

## 2021-02-24 NOTE — PROGRESS NOTES
Bedside shift report received from ELISA Walsh.  Safety check complete.  Sitter present at the bedside and given report.  All patient needs met at this time.  Will continue to monitor.

## 2021-02-24 NOTE — WOUND TEAM
"[facility]   Inpatient Services   Established Ostomy Management/ troubleshooting  HPI: Reviewed  PMH: Reviewed   SH: Reviewed   Reason for Ostomy nurse consult:  Established colostomy    Subjective: \"I feel like an animal, I'd rather just be 6ft under.\"    Colostomy 11/10/20 Transverse LUQ (Active)   Wound Image   02/24/21 0900   Stomal Appliance Assessment Clean;Dry;Intact    Stoma Assessment Intact;Beefy red    Stoma Shape Budded Less Than One Inch;Oval    Stoma Size (in) 1.25    Peristomal Assessment Intact;Pink    Mucocutaneous Junction Intact    Treatment Appliance Changed;Cleansed with water/washcloth;Site care    Peristomal Protectant No Sting Skin Prep    Stomal Appliance Paste Ring, 2\";2 1/4\" (57mm) CTF    Output (mL) 100 mL    Output Color Brown    WOUND RN ONLY - Stomal Appliance  2 Piece;2 1/4\" (57mm) CTF;Paste Ring, 2\"    Appliance (Pouch) # [id number]    Appliance Brand Daisy    WOUND NURSE ONLY - Time Spent with Patient (mins) 15      Ostomy Appliance (type and size): 2 1/4 2 piece with Paste Ring     Interventions and Education: Had patient remove appliance using push pull method. Had pt clean stoma and peristomal skin with moist warm washcloth. This RN confirmed fit of template and had pt trace template to new barrier. Pt started cutting barrier, this RN had to take over and finish cutting barrier. Confirmed fit. This RN demonstrated how to stretch paste ring and apply to barrier. This RN had pt apply barrier to skin. This RN attempted to have pt attach pouch. Pt struggled and was upset this RN had to attach pouch. Pt did close pouch end.      Evaluation:   2/24/21: Pt unable to transfer to inpatient psych until he can care for colostomy independently. Ostomy RNs were asked to return for education. Pt extremely depressed today. Pt states he feels like an animal and that the colostomy is messy and smelly and he would rather die then care for it. This RN provided understanding and " attempted to explain that colostomy is manageable. 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 Pt had colostomy created on November 10, 2020 by Dr. Desouza. May want to consider contacting surgery for possible reversal related to pts extreme body dysmorphia (this is pts 2nd admit for SI related to colostomy) and inability for pt to DC due to pts inability/unwillingness to care for colostomy - Discussed with Dr. Ley ([facility] Resident).     2/16/21 Patient with established colostomy, home health nurse was completing care.  Unclear DC plan at this time however skin is greatly improved with no redness or irritation noted.  Nursing to continue with changes every 3 days.         Plan: wound team to sign off, nursing to re-consult with any concerns.    Anticipated discharge needs: patient would benefit from SNF or Home health for follow up care.     [facility]  Inpatient Services  Initial Wound and Skin Care Evaluation    Admission Date: 2/9/2021     Last order of IP CONSULT TO WOUND CARE was found on 2/9/2021 from Hospital Encounter on 2/9/2021     HPI, PMH, SH: Reviewed    Past Surgical History:   Procedure Laterality Date   • COLECTOMY N/A 11/10/2020    Procedure: COLECTOMY-SEGMENTAL, COLOSTOMY, MOBILIZATION OF SPLENIC FLEXURE, WOUND VAC PLACEMENT, IRRIGATION AND DEBRIDMENT FLANK WOUND;  Surgeon: Kin Desouza D.O.;  Location: SURGERY [facility];  Service: General   • ZZZ CARDIAC CATH  07/21/2018    EF 45%, normal coronaries.   • IRRIGATION & DEBRIDEMENT ORTHO Right 2/19/2018    Procedure: IRRIGATION & DEBRIDEMENT ORTHO-FOOT;  Surgeon: Kirby Lopez M.D.;  Location: [facility];  Service: Orthopedics   • TOE AMPUTATION Right 1/17/2018    Procedure: TOE AMPUTATION-1ST RAY;  Surgeon: Doug SENA  JOSE LUIS Delong.;  Location: SURGERY [facility];  Service: Orthopedics   • TOE AMPUTATION Right 11/10/2017    Procedure: TOE AMPUTATION;  Surgeon: Osorio Leo M.D.;  Location: SURGERY [facility];  Service: Orthopedics     Social History     Tobacco Use   • Smoking status: Never Smoker   • Smokeless tobacco: Never Used   Substance Use Topics   • Alcohol use: No     Chief Complaint   Patient presents with   • Syncope     X1 this morning when standing up to go to bathroom     Diagnosis: Syncope and collapse [R55]    Unit where seen by Wound Team: T806/00     WOUND CONSULT/FOLLOW UP RELATED TO:  Midline incision     WOUND HISTORY:  Pt is older morbidly obese gentleman who was initially inpatient in November. Pt had a segmental colectomy with colostomy creation by Dr. Desouza on 11/10/2020 with abdominal wound vac. Wound now skin level and primarily scar tissue. Small partial thickness wound remains.     WOUND ASSESSMENT/LDA  Wound 11/10/20 Full Thickness Wound Abdomen Midline resolving open surgical  (Active)   Wound Image    02/24/21 0900   Site Assessment Bleeding;Red    Periwound Assessment Clean;Dry;Intact    Margins Attached edges;Defined edges    Closure Adhesive bandage    Drainage Amount Scant    Drainage Description Serosanguineous    Treatments Cleansed;Site care    Wound Cleansing Normal Saline Irrigation    Periwound Protectant Skin Protectant Wipes to Periwound    Dressing Cleansing/Solutions Not Applicable    Dressing Options Hydrofera Blue Ready;Silicone Adhesive Foam    Dressing Changed Changed    Dressing Status Clean;Dry;Intact    Dressing Change/Treatment Frequency Every 72 hrs, and As Needed    NEXT Dressing Change/Treatment Date 02/27/21    NEXT Weekly Photo (Inpatient Only) 03/03/21    Non-staged Wound Description Full thickness    Wound Length (cm) 6 cm    Wound Width (cm) 2.6 cm    Wound Depth (cm) 0.1 cm    Wound Surface Area (cm^2) 15.6 cm^2    Wound Volume (cm^3) 1.56 cm^3    Wound  Healing % 100    Shape Irregular linear    Wound Odor None    Pulses N/A    Exposed Structures None    WOUND NURSE ONLY - Time Spent with Patient (mins) 60    Number of days: 106      Vascular:    SHAYAN:   No results found.    Lab Values:    Lab Results   Component Value Date/Time    WBC 9.2 02/23/2021 03:21 AM    RBC 4.89 02/23/2021 03:21 AM    HEMOGLOBIN 14.1 02/23/2021 03:21 AM    HEMATOCRIT 44.2 02/23/2021 03:21 AM    CREACTPROT 7.52 (H) 11/29/2020 01:25 AM    SEDRATEWES 13 10/23/2018 08:02 AM    HBA1C 5.2 02/14/2021 01:30 AM        Culture Results show:  No results found for this or any previous visit (from the past 720 hour(s)).    Pain Level/Medicated:  Tolerated without pain medication       INTERVENTIONS BY WOUND TEAM:  Chart and images reviewed. Discussed with bedside RN. All areas of concern (based on picture review, LDA review and discussion with bedside RN) have been thoroughly assessed. Documentation of areas based on significant findings. This RN in to assess patient. Performed standard wound care which includes appropriate positioning, dressing removal and non-selective debridement. Pictures and measurements obtained weekly if/when required.  Preparation for Dressing removal: No dressing in place. Barrier paste was over wound.  Cleansed with:  NS and gauze.  Sharp debridement: NA  Joya wound: Cleansed with NS and gauze, Prepped with no sting  Primary Dressing: A piece of hydrofera blue was applied over the wound.  Secondary (Outer) Dressing: Dressing was then secured in place with a silicone adhesive foam.    Interdisciplinary consultation: Patient, Bedside RN, Bedside Sitter,    EVALUATION / RATIONALE FOR TREATMENT:  Most Recent Date:  2/24/21: Partial thickness wound. Hydrofera blue applied for its bacteriostatic properties and for absorption of drainage.     Goals: Steady decrease in wound area and depth weekly.    WOUND TEAM PLAN OF CARE ([X] for frequency of wound follow up,):   Nursing to follow  orders written for wound care. Contact wound team if area fails to progress, deteriorates or with any questions/concerns  Dressing changes by wound team:                   Follow up 3 times weekly:                NPWT change 3 times weekly:     Follow up 1-2 times weekly:      Follow up Bi-Monthly:                   Follow up as needed:   X  Other (explain):     NURSING PLAN OF CARE ORDERS (X):  Dressing changes: See Dressing Care orders: X  Skin care: See Skin Care orders:   RN Prevention Protocol:   Rectal tube care: See Rectal Tube Care orders:   Other orders:    RSKIN:   CURRENTLY IN PLACE (X), APPLIED THIS VISIT (A), ORDERED (O):   Q shift Michael:  X  Q shift pressure point assessments:  X    Surface/Positioning   Pressure redistribution mattress X           Low Airloss          Bariatric foam      Bariatric DARYL     Waffle cushion        Waffle Overlay          Reposition q 2 hours  Self mobile in bed    TAPs Turning system     Z Teja Pillow     Offloading/Redistribution VON  Sacral Mepilex (Silicone dressing)     Heel Mepilex (Silicone dressing)         Heel float boots (Prevalon boot)             Float Heels off Bed with Pillows           Respiratory Room air  Silicone O2 tubing         Gray Foam Ear protectors     Cannula fixation Device (Tender )          High flow offloading Clip    Elastic head band offloading device      Anchorfast                                                         Trach with Optifoam split foam             Containment/Moisture Prevention Pt continent, Colostomy to LLQ    Rectal tube or BMS    Purwick/Condom Cath        Zabala Catheter    Barrier wipes           Barrier paste       Antifungal tx      Interdry        Mobilization       Up to chair    X    Ambulate  X   PT/OT      Nutrition       Dietician        Diabetes Education      PO   X  TF     TPN     NPO   # days     Other        Anticipated discharge plans:   LTACH:        SNF/Rehab:                  Home Health Care:  X          Outpatient Wound Center:            Self/Family Care:        Other:    X, pending inpatient psych if/when pt is able to care for colostomy independently

## 2021-02-24 NOTE — CONSULTS
"Brief Behavioral Health Note:    Length of Intervention: 30 minutes    HPI: Sebastián Castro is a 56 year old male who presented on 2/9/2021 with dizziness and failing. Patient has a medical history that includes atrial fibrillation, CHF, diabetes, diabetic neuropathy, hyperlipidemia, HTN and orthostatic hypotension. Patient also has a history of suicidal ideations.     Assessment: Patient was seen today, seated on hospital chair eating lunch. Patient's affect was flat, speech clear and coherent, no mention of auditory or visual hallucinations today.    Patient states, \"I tried to change my bag today, it didn't go well\". \" I am still depressed and I have thoughts of suicide\". Patient continues, \"I just can't do it, I cannot change the bags\". \"I do not like the smell and I cannot shower the way I want to with the bag\".  Discussed with patient coping skills necessary to manage life circumstances. Patient states, \"I just cannot learn to cope with this, they said maybe the bag can be reversed, if it is not, I don't know what I will do\". Patient continues to endorse suicidal ideations.    Patient's sitter confirmed the colostomy bag was changed by her twice today. Requested patient change his own bag from now on. \"I just can't do it\". Asked patient when did he received the colostomy bag, he replied, \"December\". Asked patient where were you living prior to this hospital admission, patient replied, \"I was living at [facility]\". Asked patient who assists him with his colostomy bag at [facility], his reply, \"no one, you have to be independent to live there\". Patient confirmed that prior to this hospitalization he was independent in all of his ADL's including changing his colostomy bag. Discussed with patient the fact that he was independent with his colostomy bag before, so it is important that he continue to maintain independence with his self care now. Patient agreed to practice independence. Asked sitter to " allow patient to manage his colostomy bag with stand by assist. She agreed.     Summary: Patient appears to display  dependent personality traits demonstrated by a sense of helplessness that does not appear to be real. Patient is able bodied and has been independent prior to this hospitalization. when he received the colostomy bag. There appears to be engaging in manipulative behavior with a secondary gain of remaining in the hospital with 24 hour total care.   Patient continues to threaten suicide which requires monitoring, especially if we encourage him to practice his independence and discontinue relying upon staff to do the activities he is functionally and cognitively able to do for himself.  Patient continues to be at risk.    Recommendations:  1.Confirmed with wound care RN Simon, Please allow patient to manage his own self care and colostomy bag changes for the remainder of this admission with standby assist only.  2. Patient should transfer to a inpatient psychiatric facility once a bed becomes available.  3. Continue one to one sitter  4. Legal hold continues to stay in place  5. Please follow legal hold protocols according to the policy.    Thank you,    Susan Ewing, Ph.D., [facility]

## 2021-02-24 NOTE — PROGRESS NOTES
"Daily Progress Note    Date of Service: 2/24/2021  Primary Team: [facility] IM White Team   Attending: Kim Chu M.D.   Senior Resident: Dr. Ley  Intern: Dr. Bowden  Contact:  [phone]    ID: 57 yo male admitted for recurrent syncope and fall secondary to chronic orthostatic hypotension, currently on legal hold for suicidal ideation.    Chief Complaint: suicidal ideation    Subjective:  No acute events overnight. Patient still has intermittent SI and hallucinations. He states he feels depressed when he sees his ostomy bag, is unable to change it himself, \"makes me feel like an animal\". He has been walking without light headedness.    Interval Update:   Patient was seen by wound team for the ostomy today and surgery referral was recommended to see if ostomy can be reversed.  Surgery consulted.    Consultants/Specialty:  Psychiatry    Review of Systems:   Review of Systems   Constitutional: Negative for chills and fever.   Eyes: Negative for blurred vision and double vision.   Respiratory: Negative for cough and shortness of breath.    Cardiovascular: Negative for chest pain and palpitations.   Gastrointestinal: Negative for abdominal pain, heartburn, nausea and vomiting.   Genitourinary: Negative for dysuria and urgency.   Musculoskeletal: Negative for back pain and myalgias.   Skin: Negative for itching and rash.   Neurological: Negative for dizziness and headaches.   Psychiatric/Behavioral: Positive for depression and suicidal ideas. Negative for hallucinations and substance abuse.       Objective Data:   Physical Exam:   Vitals:   Temp:  [36.1 °C (97 °F)-37.7 °C (99.9 °F)] 37 °C (98.6 °F)  Pulse:  [64-99] 64  Resp:  [17-18] 17  BP: (124-154)/(64-90) 132/85  SpO2:  [92 %-98 %] 97 %    Physical Exam  Constitutional:       General: He is not in acute distress.     Appearance: Normal appearance. He is obese.   HENT:      Head: Normocephalic and atraumatic.      Right Ear: External ear normal.      Left Ear: " External ear normal.      Nose: Nose normal. No congestion.      Mouth/Throat:      Mouth: Mucous membranes are moist.      Pharynx: Oropharynx is clear.   Eyes:      Extraocular Movements: Extraocular movements intact.      Pupils: Pupils are equal, round, and reactive to light.   Cardiovascular:      Rate and Rhythm: Normal rate and regular rhythm.      Heart sounds: No murmur. No friction rub. No gallop.    Pulmonary:      Effort: Pulmonary effort is normal. No respiratory distress.      Breath sounds: Normal breath sounds.   Abdominal:      General: Abdomen is flat.      Palpations: Abdomen is soft.      Tenderness: There is no abdominal tenderness. There is no guarding.      Comments: colostomy   Musculoskeletal:         General: No swelling or deformity.      Cervical back: Normal range of motion and neck supple. No rigidity.   Skin:     General: Skin is warm and dry.      Capillary Refill: Capillary refill takes less than 2 seconds.   Neurological:      General: No focal deficit present.      Mental Status: He is alert and oriented to person, place, and time.      Cranial Nerves: No cranial nerve deficit.      Motor: No weakness.      Gait: Gait abnormal (uses walker).   Psychiatric:      Comments: Tearful at times         Labs:   Recent Labs     02/23/21  0321   SODIUM 135   POTASSIUM 4.1   CHLORIDE 101   CO2 24   GLUCOSE 98   BUN 25*      Recent Labs     02/23/21  0321   CREATININE 1.13      Recent Labs     02/23/21  0321   WBC 9.2   RBC 4.89   HEMOGLOBIN 14.1   HEMATOCRIT 44.2   MCV 90.4   MCH 28.8   RDW 47.2   PLATELETCT 119*   MPV 12.3   NEUTSPOLYS 58.00   LYMPHOCYTES 29.10   MONOCYTES 8.20   EOSINOPHILS 3.50   BASOPHILS 0.90        Imaging:   EC-ECHOCARDIOGRAM COMPLETE W/O CONT   Final Result      DX-LUMBAR SPINE-2 OR 3 VIEWS   Final Result      Moderate compression deformity of T11 is new compared to 2018.      Mild wedge deformity of T12 is unchanged.      Degenerative changes including facet  arthropathy.      Mild retrolisthesis of L5 on S1 and L3 on L4.              Problem Representation:     57 yo male admitted for recurrent syncope and fall secondary to chronic orthostatic hypotension, currently on legal hold for suicidal ideation. Pending placement to inpatient psych facility.    * Suicidal ideation  Assessment & Plan  Has history of MDD, recurrent with psychotic features with active SI w/ plan. Patient notes that he never had any depression or SI until after his colostomy surgery. Seems to be averse to changing the bag or even looking at it.  -Psychiatry following  -Continue legal hold  -Patient is medical and transfer orders placed for medical floor.  -Transfer to an inpatient psychiatric facility pending, difficult to get a bed currently per CM    Major depressive disorder, recurrent, severe with psychotic features (HCC)  Assessment & Plan  -Psychiatry and Psychology following  -Per Psychiatry, continue modafinil to target depression, avoid SSRI's I.e fluoxetine due to increased risk of QTc prolongation  -Risperidone qhs  -Patient medically cleared    Colostomy present (HCC)- (present on admission)  Assessment & Plan  History of fall at home Nov 2020 on left side with perforated colon and splenic fluid collection/hematoma s/p segmental colectomy, colostomy, mobilization of the splenic flexure, wound vac, and I&D Nov 2020.   -home health orders placed for skilled nursing for colostomy bag  -contributing to his SI  -Surgery consulted to evaluate for reversal    Orthostatic hypotension- (present on admission)  Assessment & Plan  Multifactorial including medications (beta blocker, CCB, tamsulosin), diabetic autonomic dysfunction, and venous insufficiency. Euvolemic. Echocardiogram shows improved EF 60% with no concerning valvular dysfunction. No history of seizures. Cosyntropin test with cortisol >18 which rules out adrenal insufficiency.  Ambulated well with walker. Echo repeated this  hospitalization showed EF of 60%.  -Hold BB, CCB, tamsulosin  -Per cardiology, no further cardiac work-up needed  -Midodrine 5mg QID  -Medically clear    Syncope  Assessment & Plan  See orthostatic hypotension section. Does have a history of frequent falls in the past several months.    Paroxysmal atrial fibrillation (HCC)  Assessment & Plan  CHADSVASC 3 (HTN, CHF, DM). Rates 60s-70s w/out beta blockade. TTE Feb 2021: EF 60%,   -Hold BB, CCB as above  -Digoxin restarted this hospitalization  -goal HR <110 at rest  -Replete Mg > 2, K > 4  -Start apixaban 5mg BID, benefits outweigh risks    Chronic HFrEF (heart failure with reduced ejection fraction) ([facility])  Assessment & Plan  EF 35% on Echo 2020, improved on TTE Feb 2021 to EF 60%. BNP >4k->downtrending.   -Lisinopril, lasix and Toprol XL held at this time; may restart upon discharge    Diabetes mellitus type 2, insulin dependent ([facility])  Assessment & Plan  Controlled given co-morbidities. A1c 7.8% Nov 2020, repeat 5.2% Feb 2021.  -Lantus 13u  -ISS/hypoglycemia protocol  -Dabetic diet  -Diabetes education    Hypothyroidism- (present on admission)  Assessment & Plan  TSH wnl Feb 2021  -continue home Levothyroxine    Essential hypertension- (present on admission)  Assessment & Plan  Monitor vitals.    Lower limb amputation, great toe (HCC)- (present on admission)  Assessment & Plan  s/p right great toe amputation 2018    Elevated troponin  Assessment & Plan  RESOLVED  Stable. Suspect due to demand ischemia. No need to trend further.    Debility- (present on admission)  Assessment & Plan  -PT/OT consults: recommend Home health PT. Ordered 4 wheeled walker with seat  -patient to ambulate three times daily out of bed on floor with assistance and out of bed to chair for meals.    Chest pain  Assessment & Plan  resolved    Chronic venous insufficiency of lower extremity  Assessment & Plan  -elevated legs  -compression stockings  -lotion, soap/water to legs  -weight loss (BMI  38)    Nonischemic cardiomyopathy (HCC)- (present on admission)  Assessment & Plan  Normal coronaries, 2018.Tachycardia mediated  -LVEF improved, based on echo 2/10/2021 LVEF 60%    Mixed hyperlipidemia- (present on admission)  Assessment & Plan  LDL 82 Feb 2021  -repeat lipid panel  -continue Atorvatatin    Obesity- (present on admission)  Assessment & Plan  -Counseled on weight loss, exercise   -Nutrition consult      Code Status: Full  Diet: Diabetic  Lines/Tubes/Drains:Colostomy bag  IVF: n/a  Prophylaxis:  DVT: apixaban  GI: n/a  Disposition: Inpatient psych facility

## 2021-02-24 NOTE — CARE PLAN
Problem: Communication  Goal: The ability to communicate needs accurately and effectively will improve  2/24/2021 0301 by Catie Mercer R.N.  Outcome: PROGRESSING AS EXPECTED     Problem: Safety  Goal: Will remain free from injury  2/24/2021 0301 by Catie Mercer R.N.  Outcome: PROGRESSING AS EXPECTED  Goal: Will remain free from falls  2/24/2021 0301 by Catie Mercer R.N.  Outcome: PROGRESSING AS EXPECTED     Problem: Infection  Goal: Will remain free from infection  2/24/2021 0301 by Catie Mercer R.N.  Outcome: PROGRESSING AS EXPECTED     Problem: Venous Thromboembolism (VTW)/Deep Vein Thrombosis (DVT) Prevention:  Goal: Patient will participate in Venous Thrombosis (VTE)/Deep Vein Thrombosis (DVT)Prevention Measures  2/24/2021 0301 by Catie Mercer R.N.  Outcome: PROGRESSING AS EXPECTED    Problem: Bowel/Gastric:  Goal: Normal bowel function is maintained or improved  2/24/2021 0301 by Catie Mercer R.N.  Outcome: PROGRESSING AS EXPECTED  Goal: Will not experience complications related to bowel motility  2/24/2021 0301 by Catie Mercer R.N.  Outcome: PROGRESSING AS EXPECTED     Problem: Fluid Volume:  Goal: Will maintain balanced intake and output  2/24/2021 0301 by Catie Mercer R.N.  Outcome: PROGRESSING AS EXPECTED    Problem: Mobility  Goal: Risk for activity intolerance will decrease  2/24/2021 0301 by Catie Mercer R.N.  Outcome: PROGRESSING AS EXPECTED     Problem: Pain Management  Goal: Pain level will decrease to patient's comfort goal  2/24/2021 0301 by Catie Mercer R.N.  Outcome: PROGRESSING AS EXPECTED     Problem: Skin Integrity  Goal: Risk for impaired skin integrity will decrease  2/24/2021 0301 by Catie Mercer R.N.  Outcome: PROGRESSING AS EXPECTED     Problem: Respiratory:  Goal: Respiratory status will improve  2/24/2021 0301 by Catie Mercer R.N.  Outcome: PROGRESSING AS EXPECTED

## 2021-02-25 LAB
GLUCOSE BLD-MCNC: 103 MG/DL (ref 65–99)
GLUCOSE BLD-MCNC: 114 MG/DL (ref 65–99)
GLUCOSE BLD-MCNC: 122 MG/DL (ref 65–99)

## 2021-02-25 PROCEDURE — 700102 HCHG RX REV CODE 250 W/ 637 OVERRIDE(OP): Performed by: PSYCHIATRY & NEUROLOGY

## 2021-02-25 PROCEDURE — 770001 HCHG ROOM/CARE - MED/SURG/GYN PRIV*

## 2021-02-25 PROCEDURE — 700102 HCHG RX REV CODE 250 W/ 637 OVERRIDE(OP): Performed by: PHYSICIAN ASSISTANT

## 2021-02-25 PROCEDURE — 82962 GLUCOSE BLOOD TEST: CPT

## 2021-02-25 PROCEDURE — 700102 HCHG RX REV CODE 250 W/ 637 OVERRIDE(OP): Performed by: INTERNAL MEDICINE

## 2021-02-25 PROCEDURE — A9270 NON-COVERED ITEM OR SERVICE: HCPCS | Performed by: INTERNAL MEDICINE

## 2021-02-25 PROCEDURE — 700102 HCHG RX REV CODE 250 W/ 637 OVERRIDE(OP): Performed by: GENERAL PRACTICE

## 2021-02-25 PROCEDURE — A9270 NON-COVERED ITEM OR SERVICE: HCPCS | Performed by: PHYSICIAN ASSISTANT

## 2021-02-25 PROCEDURE — 99231 SBSQ HOSP IP/OBS SF/LOW 25: CPT | Mod: GC | Performed by: STUDENT IN AN ORGANIZED HEALTH CARE EDUCATION/TRAINING PROGRAM

## 2021-02-25 PROCEDURE — A9270 NON-COVERED ITEM OR SERVICE: HCPCS | Performed by: GENERAL PRACTICE

## 2021-02-25 PROCEDURE — A9270 NON-COVERED ITEM OR SERVICE: HCPCS | Performed by: HOSPITALIST

## 2021-02-25 PROCEDURE — 700102 HCHG RX REV CODE 250 W/ 637 OVERRIDE(OP): Performed by: HOSPITALIST

## 2021-02-25 PROCEDURE — A9270 NON-COVERED ITEM OR SERVICE: HCPCS | Performed by: PSYCHIATRY & NEUROLOGY

## 2021-02-25 RX ADMIN — INSULIN GLARGINE 13 UNITS: 100 INJECTION, SOLUTION SUBCUTANEOUS at 17:46

## 2021-02-25 RX ADMIN — DIGOXIN 125 MCG: 125 TABLET ORAL at 17:45

## 2021-02-25 RX ADMIN — RISPERIDONE 0.5 MG: 0.5 TABLET ORAL at 17:44

## 2021-02-25 RX ADMIN — DOCUSATE SODIUM 50 MG AND SENNOSIDES 8.6 MG 2 TABLET: 8.6; 5 TABLET, FILM COATED ORAL at 17:44

## 2021-02-25 RX ADMIN — NYSTATIN: 100000 POWDER TOPICAL at 04:51

## 2021-02-25 RX ADMIN — ZOLPIDEM TARTRATE 2.5 MG: 5 TABLET ORAL at 20:47

## 2021-02-25 RX ADMIN — MODAFINIL 150 MG: 100 TABLET ORAL at 04:51

## 2021-02-25 RX ADMIN — APIXABAN 5 MG: 5 TABLET, FILM COATED ORAL at 04:50

## 2021-02-25 RX ADMIN — LEVOTHYROXINE SODIUM 112 MCG: 0.11 TABLET ORAL at 04:50

## 2021-02-25 RX ADMIN — MIDODRINE HYDROCHLORIDE 5 MG: 5 TABLET ORAL at 20:39

## 2021-02-25 RX ADMIN — APIXABAN 5 MG: 5 TABLET, FILM COATED ORAL at 17:45

## 2021-02-25 RX ADMIN — DOCUSATE SODIUM 50 MG AND SENNOSIDES 8.6 MG 2 TABLET: 8.6; 5 TABLET, FILM COATED ORAL at 04:51

## 2021-02-25 RX ADMIN — ATORVASTATIN CALCIUM 40 MG: 40 TABLET, FILM COATED ORAL at 17:44

## 2021-02-25 ASSESSMENT — ENCOUNTER SYMPTOMS
FEVER: 0
COUGH: 0
DOUBLE VISION: 0
ABDOMINAL PAIN: 0
VOMITING: 0
CHILLS: 0
HEADACHES: 0
BACK PAIN: 0
NAUSEA: 0
DEPRESSION: 1
MYALGIAS: 0
PALPITATIONS: 0
SHORTNESS OF BREATH: 0
HALLUCINATIONS: 0
DIZZINESS: 0
BLURRED VISION: 0
HEARTBURN: 0

## 2021-02-25 ASSESSMENT — LIFESTYLE VARIABLES: SUBSTANCE_ABUSE: 0

## 2021-02-25 ASSESSMENT — FIBROSIS 4 INDEX: FIB4 SCORE: 2.38

## 2021-02-25 NOTE — PROGRESS NOTES
Daily Progress Note    Date of Service: 2/25/2021  Primary Team: [facility] White Team   Attending: Kim Chu M.D.   Senior Resident: Dr. Ley  Intern: Dr. Bowden  Contact:  [phone]    ID: 57 yo male admitted for recurrent syncope and fall secondary to chronic orthostatic hypotension, currently on legal hold for suicidal ideation.    Chief Complaint: suicidal ideation    Subjective:  Interval Updates: per psych, patient has traits of dependent personality disorder and is possibly engaging in manipulative behaviors    No acute events overnight. Continues to note depression and suicidal thoughts. He says that he is trying to do more ostomy care but just seeing the bag makes his mood worse. Denies any pain.    Consultants/Specialty:  Psychiatry    Review of Systems:   Review of Systems   Constitutional: Negative for chills and fever.   Eyes: Negative for blurred vision and double vision.   Respiratory: Negative for cough and shortness of breath.    Cardiovascular: Negative for chest pain and palpitations.   Gastrointestinal: Negative for abdominal pain, heartburn, nausea and vomiting.   Genitourinary: Negative for dysuria and urgency.   Musculoskeletal: Negative for back pain and myalgias.   Skin: Negative for itching and rash.   Neurological: Negative for dizziness and headaches.   Psychiatric/Behavioral: Positive for depression and suicidal ideas. Negative for hallucinations and substance abuse.       Objective Data:   Physical Exam:   Vitals:   Temp:  [35.8 °C (96.5 °F)-36.9 °C (98.4 °F)] 36.1 °C (96.9 °F)  Pulse:  [] 73  Resp:  [17-18] 18  BP: (128-150)/() 142/66  SpO2:  [94 %-98 %] 95 %    Physical Exam  Constitutional:       General: He is not in acute distress.     Appearance: Normal appearance. He is obese.   HENT:      Head: Normocephalic and atraumatic.      Right Ear: External ear normal.      Left Ear: External ear normal.      Nose: Nose normal. No congestion.      Mouth/Throat:       Mouth: Mucous membranes are moist.      Pharynx: Oropharynx is clear.   Eyes:      Extraocular Movements: Extraocular movements intact.      Pupils: Pupils are equal, round, and reactive to light.   Cardiovascular:      Rate and Rhythm: Normal rate and regular rhythm.      Heart sounds: No murmur. No friction rub. No gallop.    Pulmonary:      Effort: Pulmonary effort is normal. No respiratory distress.      Breath sounds: Normal breath sounds.   Abdominal:      General: Abdomen is flat.      Palpations: Abdomen is soft.      Tenderness: There is no abdominal tenderness. There is no guarding.      Comments: colostomy   Musculoskeletal:         General: No swelling or deformity.      Cervical back: Normal range of motion and neck supple. No rigidity.   Skin:     General: Skin is warm and dry.      Capillary Refill: Capillary refill takes less than 2 seconds.   Neurological:      General: No focal deficit present.      Mental Status: He is alert and oriented to person, place, and time.      Cranial Nerves: No cranial nerve deficit.      Motor: No weakness.      Gait: Gait abnormal (uses walker).   Psychiatric:      Comments: Tearful at times         Labs:   Recent Labs     02/23/21  0321   SODIUM 135   POTASSIUM 4.1   CHLORIDE 101   CO2 24   GLUCOSE 98   BUN 25*      Recent Labs     02/23/21  0321   CREATININE 1.13      Recent Labs     02/23/21  0321   WBC 9.2   RBC 4.89   HEMOGLOBIN 14.1   HEMATOCRIT 44.2   MCV 90.4   MCH 28.8   RDW 47.2   PLATELETCT 119*   MPV 12.3   NEUTSPOLYS 58.00   LYMPHOCYTES 29.10   MONOCYTES 8.20   EOSINOPHILS 3.50   BASOPHILS 0.90        Imaging:   EC-ECHOCARDIOGRAM COMPLETE W/O CONT   Final Result      DX-LUMBAR SPINE-2 OR 3 VIEWS   Final Result      Moderate compression deformity of T11 is new compared to 2018.      Mild wedge deformity of T12 is unchanged.      Degenerative changes including facet arthropathy.      Mild retrolisthesis of L5 on S1 and L3 on L4.              Problem  Representation:     55 yo male admitted for recurrent syncope and fall secondary to chronic orthostatic hypotension, currently on legal hold for suicidal ideation. Pending placement to inpatient psych facility.    * Suicidal ideation  Assessment & Plan  Has history of MDD, recurrent with psychotic features with active SI w/ plan. Patient notes that he never had any depression or SI until after his colostomy surgery. Seems to be averse to changing the bag or even looking at it.  -Psychiatry following  -Continue legal hold  -Patient is medical and transfer orders placed for medical floor.  -Transfer to an inpatient psychiatric facility pending, difficult to get a bed currently per CM    Major depressive disorder, recurrent, severe with psychotic features (HCC)  Assessment & Plan  -Psychiatry and Psychology following  -Per Psychiatry, continue modafinil to target depression, avoid SSRI's I.e fluoxetine due to increased risk of QTc prolongation  -Risperidone qhs  -Patient medically cleared    Colostomy present (HCC)- (present on admission)  Assessment & Plan  History of fall at home Nov 2020 on left side with perforated colon and splenic fluid collection/hematoma s/p segmental colectomy, colostomy, mobilization of the splenic flexure, wound vac, and I&D Nov 2020.   -home health orders placed for skilled nursing for colostomy bag  -contributing to his SI  -Surgery consulted to evaluate for reversal    Orthostatic hypotension- (present on admission)  Assessment & Plan  Multifactorial including medications (beta blocker, CCB, tamsulosin), diabetic autonomic dysfunction, and venous insufficiency. Euvolemic. Echocardiogram shows improved EF 60% with no concerning valvular dysfunction. No history of seizures. Cosyntropin test with cortisol >18 which rules out adrenal insufficiency.  Ambulated well with walker. Echo repeated this hospitalization showed EF of 60%.  -Hold BB, CCB, tamsulosin  -Per cardiology, no further cardiac  work-up needed  -Midodrine 5mg QID  -Medically clear    Syncope  Assessment & Plan  See orthostatic hypotension section. Does have a history of frequent falls in the past several months.    Paroxysmal atrial fibrillation (HCC)  Assessment & Plan  CHADSVASC 3 (HTN, CHF, DM). Rates 60s-70s w/out beta blockade. TTE Feb 2021: EF 60%,   -Hold BB, CCB as above  -Digoxin restarted this hospitalization  -goal HR <110 at rest  -Replete Mg > 2, K > 4  -Start apixaban 5mg BID, benefits outweigh risks    Chronic HFrEF (heart failure with reduced ejection fraction) ([facility])  Assessment & Plan  EF 35% on Echo 2020, improved on TTE Feb 2021 to EF 60%. BNP >4k->downtrending.   -Lisinopril, lasix and Toprol XL held at this time; may restart upon discharge    Diabetes mellitus type 2, insulin dependent ([facility])  Assessment & Plan  Controlled given co-morbidities. A1c 7.8% Nov 2020, repeat 5.2% Feb 2021.  -Lantus 13u  -ISS/hypoglycemia protocol  -Dabetic diet  -Diabetes education    Hypothyroidism- (present on admission)  Assessment & Plan  TSH wnl Feb 2021  -continue home Levothyroxine    Essential hypertension- (present on admission)  Assessment & Plan  Monitor vitals.    Lower limb amputation, great toe (HCC)- (present on admission)  Assessment & Plan  s/p right great toe amputation 2018    Elevated troponin  Assessment & Plan  RESOLVED  Stable. Suspect due to demand ischemia. No need to trend further.    Debility- (present on admission)  Assessment & Plan  -PT/OT consults: recommend Home health PT. Ordered 4 wheeled walker with seat  -patient to ambulate three times daily out of bed on floor with assistance and out of bed to chair for meals.    Chest pain  Assessment & Plan  resolved    Chronic venous insufficiency of lower extremity  Assessment & Plan  -elevated legs  -compression stockings  -lotion, soap/water to legs  -weight loss (BMI 38)    Nonischemic cardiomyopathy (HCC)- (present on admission)  Assessment & Plan  Normal  coronaries, 2018.Tachycardia mediated  -LVEF improved, based on echo 2/10/2021 LVEF 60%    Mixed hyperlipidemia- (present on admission)  Assessment & Plan  LDL 82 Feb 2021  -repeat lipid panel  -continue Atorvatatin    Obesity- (present on admission)  Assessment & Plan  -Counseled on weight loss, exercise   -Nutrition consult      Code Status: Full  Diet: Diabetic  Lines/Tubes/Drains:Colostomy bag  IVF: n/a  Prophylaxis:  DVT: apixaban  GI: n/a  Disposition: Inpatient psych facility

## 2021-02-25 NOTE — CARE PLAN
Problem: Communication  Goal: The ability to communicate needs accurately and effectively will improve  Outcome: PROGRESSING AS EXPECTED  Note: Pt communicates needs effectively with staff.       Problem: Safety  Goal: Will remain free from injury  Outcome: PROGRESSING AS EXPECTED  Note: 1:1 sitter at bedside     Problem: Pain Management  Goal: Pain level will decrease to patient's comfort goal  Outcome: PROGRESSING AS EXPECTED  Note: Pt utilizes pain scale appropriately

## 2021-02-25 NOTE — DISCHARGE PLANNING
Received Stipulation and Order from the court continuing pt's legal hold until 3/4. Sent copy to ELISA Mahan, scanned copy into media manager.

## 2021-02-25 NOTE — DISCHARGE PLANNING
1300 - received Stipulation and Order for Court Ordered Involuntary Admission fax from [facility] for involuntary admission extended until 3/4/2021, placed in pts chart.

## 2021-02-25 NOTE — PROGRESS NOTES
Received report from Ratna PÉREZ regarding prior 12 hours. Pt awake sitting up in chair; sitter at bedside. Pt denies any pain at this time. POC reviewed; all questions answered. Bed locked in lowest position, call light within reach. Will continue to monitor.

## 2021-02-26 LAB
GLUCOSE BLD-MCNC: 110 MG/DL (ref 65–99)
GLUCOSE BLD-MCNC: 121 MG/DL (ref 65–99)
GLUCOSE BLD-MCNC: 123 MG/DL (ref 65–99)

## 2021-02-26 PROCEDURE — 700102 HCHG RX REV CODE 250 W/ 637 OVERRIDE(OP): Performed by: STUDENT IN AN ORGANIZED HEALTH CARE EDUCATION/TRAINING PROGRAM

## 2021-02-26 PROCEDURE — A9270 NON-COVERED ITEM OR SERVICE: HCPCS | Performed by: INTERNAL MEDICINE

## 2021-02-26 PROCEDURE — 82962 GLUCOSE BLOOD TEST: CPT | Mod: 91

## 2021-02-26 PROCEDURE — 99232 SBSQ HOSP IP/OBS MODERATE 35: CPT | Mod: GC | Performed by: HOSPITALIST

## 2021-02-26 PROCEDURE — 700102 HCHG RX REV CODE 250 W/ 637 OVERRIDE(OP): Performed by: HOSPITALIST

## 2021-02-26 PROCEDURE — 700102 HCHG RX REV CODE 250 W/ 637 OVERRIDE(OP): Performed by: PHYSICIAN ASSISTANT

## 2021-02-26 PROCEDURE — 700102 HCHG RX REV CODE 250 W/ 637 OVERRIDE(OP): Performed by: PSYCHIATRY & NEUROLOGY

## 2021-02-26 PROCEDURE — A9270 NON-COVERED ITEM OR SERVICE: HCPCS | Performed by: PSYCHIATRY & NEUROLOGY

## 2021-02-26 PROCEDURE — 770001 HCHG ROOM/CARE - MED/SURG/GYN PRIV*

## 2021-02-26 PROCEDURE — A9270 NON-COVERED ITEM OR SERVICE: HCPCS | Performed by: STUDENT IN AN ORGANIZED HEALTH CARE EDUCATION/TRAINING PROGRAM

## 2021-02-26 PROCEDURE — 700102 HCHG RX REV CODE 250 W/ 637 OVERRIDE(OP): Performed by: GENERAL PRACTICE

## 2021-02-26 PROCEDURE — A9270 NON-COVERED ITEM OR SERVICE: HCPCS | Performed by: GENERAL PRACTICE

## 2021-02-26 PROCEDURE — 700102 HCHG RX REV CODE 250 W/ 637 OVERRIDE(OP): Performed by: INTERNAL MEDICINE

## 2021-02-26 PROCEDURE — 90834 PSYTX W PT 45 MINUTES: CPT | Performed by: PSYCHOLOGIST

## 2021-02-26 PROCEDURE — A9270 NON-COVERED ITEM OR SERVICE: HCPCS | Performed by: HOSPITALIST

## 2021-02-26 PROCEDURE — A9270 NON-COVERED ITEM OR SERVICE: HCPCS | Performed by: PHYSICIAN ASSISTANT

## 2021-02-26 RX ORDER — CLONAZEPAM 0.5 MG/1
0.5 TABLET ORAL 2 TIMES DAILY
Status: DISCONTINUED | OUTPATIENT
Start: 2021-02-26 | End: 2021-03-03

## 2021-02-26 RX ORDER — HYDROXYZINE HYDROCHLORIDE 25 MG/1
25 TABLET, FILM COATED ORAL ONCE
Status: COMPLETED | OUTPATIENT
Start: 2021-02-26 | End: 2021-02-26

## 2021-02-26 RX ORDER — HYDROXYZINE HYDROCHLORIDE 25 MG/1
25 TABLET, FILM COATED ORAL 3 TIMES DAILY PRN
Status: DISCONTINUED | OUTPATIENT
Start: 2021-02-26 | End: 2021-04-13

## 2021-02-26 RX ORDER — NYSTATIN 100000 [USP'U]/G
POWDER TOPICAL 2 TIMES DAILY PRN
Status: DISPENSED | OUTPATIENT
Start: 2021-02-26 | End: 2021-03-05

## 2021-02-26 RX ADMIN — MIDODRINE HYDROCHLORIDE 5 MG: 5 TABLET ORAL at 18:48

## 2021-02-26 RX ADMIN — MIDODRINE HYDROCHLORIDE 5 MG: 5 TABLET ORAL at 04:15

## 2021-02-26 RX ADMIN — MODAFINIL 150 MG: 100 TABLET ORAL at 04:46

## 2021-02-26 RX ADMIN — HYDROXYZINE HYDROCHLORIDE 25 MG: 25 TABLET, FILM COATED ORAL at 14:45

## 2021-02-26 RX ADMIN — DIGOXIN 125 MCG: 125 TABLET ORAL at 18:48

## 2021-02-26 RX ADMIN — APIXABAN 5 MG: 5 TABLET, FILM COATED ORAL at 18:48

## 2021-02-26 RX ADMIN — HYDROXYZINE HYDROCHLORIDE 25 MG: 25 TABLET, FILM COATED ORAL at 20:39

## 2021-02-26 RX ADMIN — NYSTATIN: 100000 POWDER TOPICAL at 04:15

## 2021-02-26 RX ADMIN — DOCUSATE SODIUM 50 MG AND SENNOSIDES 8.6 MG 2 TABLET: 8.6; 5 TABLET, FILM COATED ORAL at 18:49

## 2021-02-26 RX ADMIN — HYDROXYZINE HYDROCHLORIDE 25 MG: 25 TABLET, FILM COATED ORAL at 07:21

## 2021-02-26 RX ADMIN — INSULIN GLARGINE 13 UNITS: 100 INJECTION, SOLUTION SUBCUTANEOUS at 18:47

## 2021-02-26 RX ADMIN — APIXABAN 5 MG: 5 TABLET, FILM COATED ORAL at 04:15

## 2021-02-26 RX ADMIN — ZOLPIDEM TARTRATE 2.5 MG: 5 TABLET ORAL at 20:39

## 2021-02-26 RX ADMIN — DOCUSATE SODIUM 50 MG AND SENNOSIDES 8.6 MG 2 TABLET: 8.6; 5 TABLET, FILM COATED ORAL at 04:15

## 2021-02-26 RX ADMIN — ATORVASTATIN CALCIUM 40 MG: 40 TABLET, FILM COATED ORAL at 18:48

## 2021-02-26 RX ADMIN — CLONAZEPAM 0.5 MG: 0.5 TABLET ORAL at 18:48

## 2021-02-26 RX ADMIN — LEVOTHYROXINE SODIUM 112 MCG: 0.11 TABLET ORAL at 04:15

## 2021-02-26 RX ADMIN — MIDODRINE HYDROCHLORIDE 5 MG: 5 TABLET ORAL at 11:11

## 2021-02-26 ASSESSMENT — ENCOUNTER SYMPTOMS
HEADACHES: 0
FEVER: 0
BACK PAIN: 0
COUGH: 0
MYALGIAS: 0
CHILLS: 0
SHORTNESS OF BREATH: 0
DOUBLE VISION: 0
PALPITATIONS: 0
ABDOMINAL PAIN: 0
BLURRED VISION: 0
DEPRESSION: 1
HALLUCINATIONS: 1
NAUSEA: 0
DIZZINESS: 0
HEARTBURN: 0
VOMITING: 0

## 2021-02-26 ASSESSMENT — FIBROSIS 4 INDEX: FIB4 SCORE: 2.38

## 2021-02-26 ASSESSMENT — LIFESTYLE VARIABLES: SUBSTANCE_ABUSE: 0

## 2021-02-26 NOTE — PROGRESS NOTES
Bedside report received. Fall precautions in place. Patient in bed. Patient alert and oriented x4. Call light within reach. Bed in low and locked position. Safety sitter at bedside. POC reviewed with patient. No further questions or needs at this time.

## 2021-02-26 NOTE — CONSULTS
"PSYCHOLOGICAL FOLLOW-UP:  Reason for admission: Syncope and collapse [R55]  Length of Visit: 45min    Legal status: Legal Status: Involuntary    Chief Complaint: \"I'm scared of myself.\"    HPI: Met with the patient for brief individual psychotherapy. Patient presented with an anxious affect and reported a congruent mood. He continues to display psychomotor agitation in his legs. This writer also noticed a chunk of the patient's hair on the table. When asked about it, he stated that he pulled it out himself due to feeling anxious. The patient continues to endorse active suicidal thoughts with multiple plans. He also believes he would try to commit suicide if he was not currently in the hospital. Today, the patient also experienced thoughts of wanting to hurt someone however he did not have a specific plan and distress associated with the thoughts (\"I'm not like that.\") He agreed to let someone know if those thoughts became more severe. He also stated, \"I'm scared of myself.\" and reported significant difficulty sleeping at night. He believes he is only getting about 3 hours of sleep a night due to anxiety. Discussed the importance of sleep and how this may be a factor in his increased suicidality and homicidality. Will discuss this with the team psychiatrist.     Psychiatric Examination:  Vitals: Blood pressure 134/79, pulse (!) 108, temperature 36.1 °C (97 °F), temperature source Temporal, resp. rate 20, height 1.829 m (6'), weight 124 kg (274 lb 4 oz), SpO2 99 %.  Musculoskeletal: psychomotor agitation noted  Appearance and Eye Contact:dishevled. Behavior is anxious, cooperative,  appropriate eye contact  Attention/Alertness: Alert  Thought Process: Linear, Logical and Goal Directed    Thought Content: SI and auditory hallucinations  Speech: Clear with normal rate and rhythm  Mood: \"anxious\"            Affect: very anxious         SI/HI: Endorses    Memory: Recent and remote memory appear intact    Orientation: " alert, oriented to person, place and time  Insight into symptoms: fair  Judgement into symptoms:fair    ASSESSMENT: The patient is experiencing increased anxiety and insomnia that appears to be contributing to worsening suicidality and now passive thoughts of wanting to hurt others. His auditory hallucinations appear to be worsening (per his report) and he is becoming more and more distressed by the experience. Will discuss this further with psychiatry to see if psychiatric medication adjustment are warranted. LEGAL HOLD REMAINS EXTENDED.         DSM5 Diagnostic Considerations:     Major Depressive Disorder, recurrent, severe, with psychotic features  Unspecified Anxiety Disorder    PLAN:  Legal status: Involuntary     1:1 sitter    Due to increased suicidality and anxiety, hold off on having patient perform his own ostomy care as this is a trigger for him and he does not have the tools at this time to cope with it. Nursing notified.     Will discuss case with team psychiatrist     Records reviewed: yes    Will continue to follow     Thank you for the consult.    Madhuri Fernandez, Ph.D.

## 2021-02-26 NOTE — CARE PLAN
"  Problem: Safety  Goal: Will remain free from injury  Outcome: PROGRESSING AS EXPECTED  Goal: Will remain free from falls  Outcome: PROGRESSING AS EXPECTED     Problem: Knowledge Deficit  Goal: Knowledge of disease process/condition, treatment plan, diagnostic tests, and medications will improve  Outcome: PROGRESSING AS EXPECTED     Problem: Discharge Barriers/Planning  Goal: Patient's continuum of care needs will be met  Outcome: PROGRESSING AS EXPECTED    Pt still expressing desire to \"be done with it all\" but is realizing that discussion about next steps make sense.     "

## 2021-02-26 NOTE — PROGRESS NOTES
Daily Progress Note    Date of Service: 2/26/2021  Primary Team: [facility] White Team   Attending: Dr. Gonzalez   Senior Resident: Dr. Ley  Intern: Dr. Bowden  Contact:  [phone]    ID: 55 yo male admitted for recurrent syncope and fall secondary to chronic orthostatic hypotension, currently on legal hold for suicidal ideation.    Chief Complaint: suicidal ideation    Subjective:  Interval Updates: per psych, patient has traits of dependent personality disorder and is possibly engaging in manipulative behaviors    No acute events overnight. Continues to note depression and suicidal thoughts. In addition he has auditory hallucinations this morning which tell him to kill himself. He denies having any acute needs at this time. Agreeable to plan for continued placement efforts to inpatient psychiatry. Denies any pain.    Consultants/Specialty:  Psychiatry    Review of Systems:   Review of Systems   Constitutional: Negative for chills and fever.   Eyes: Negative for blurred vision and double vision.   Respiratory: Negative for cough and shortness of breath.    Cardiovascular: Negative for chest pain and palpitations.   Gastrointestinal: Negative for abdominal pain, heartburn, nausea and vomiting.   Genitourinary: Negative for dysuria and urgency.   Musculoskeletal: Negative for back pain and myalgias.   Skin: Negative for itching and rash.   Neurological: Negative for dizziness and headaches.   Psychiatric/Behavioral: Positive for depression, hallucinations (auditory) and suicidal ideas. Negative for substance abuse.       Objective Data:   Physical Exam:   Vitals:   Temp:  [36.1 °C (97 °F)-36.5 °C (97.7 °F)] 36.1 °C (97 °F)  Pulse:  [] 108  Resp:  [17-20] 20  BP: (100-145)/(51-82) 134/79  SpO2:  [93 %-99 %] 99 %    Physical Exam  Constitutional:       General: He is not in acute distress.     Appearance: Normal appearance. He is obese.   HENT:      Head: Normocephalic and atraumatic.      Right Ear: External  ear normal.      Left Ear: External ear normal.      Nose: Nose normal. No congestion.      Mouth/Throat:      Mouth: Mucous membranes are moist.      Pharynx: Oropharynx is clear.   Eyes:      Extraocular Movements: Extraocular movements intact.      Pupils: Pupils are equal, round, and reactive to light.   Cardiovascular:      Rate and Rhythm: Normal rate and regular rhythm.      Heart sounds: No murmur. No friction rub. No gallop.    Pulmonary:      Effort: Pulmonary effort is normal. No respiratory distress.      Breath sounds: Normal breath sounds.   Abdominal:      General: Abdomen is flat.      Palpations: Abdomen is soft.      Tenderness: There is no abdominal tenderness. There is no guarding.      Comments: colostomy   Musculoskeletal:         General: No swelling or deformity.      Cervical back: Normal range of motion and neck supple. No rigidity.   Skin:     General: Skin is warm and dry.      Capillary Refill: Capillary refill takes less than 2 seconds.   Neurological:      General: No focal deficit present.      Mental Status: He is alert and oriented to person, place, and time.      Cranial Nerves: No cranial nerve deficit.      Motor: No weakness.      Gait: Gait abnormal (uses walker).   Psychiatric:      Comments: Tearful at times         Labs:   No results for input(s): SODIUM, POTASSIUM, CHLORIDE, CO2, GLUCOSE, BUN, CPKTOTAL in the last 72 hours.   No results for input(s): ALBUMIN, TBILIRUBIN, ALKPHOSPHAT, TOTPROTEIN, ALTSGPT, ASTSGOT, CREATININE in the last 72 hours.   No results for input(s): WBC, RBC, HEMOGLOBIN, HEMATOCRIT, MCV, MCH, RDW, PLATELETCT, MPV, NEUTSPOLYS, LYMPHOCYTES, MONOCYTES, EOSINOPHILS, BASOPHILS, RBCMORPHOLO in the last 72 hours.     Imaging:   EC-ECHOCARDIOGRAM COMPLETE W/O CONT   Final Result      DX-LUMBAR SPINE-2 OR 3 VIEWS   Final Result      Moderate compression deformity of T11 is new compared to 2018.      Mild wedge deformity of T12 is unchanged.      Degenerative  changes including facet arthropathy.      Mild retrolisthesis of L5 on S1 and L3 on L4.              Problem Representation:     57 yo male admitted for recurrent syncope and fall secondary to chronic orthostatic hypotension, currently on legal hold for suicidal ideation. Pending placement to inpatient psych facility.    * Suicidal ideation  Assessment & Plan  Has history of MDD, recurrent with psychotic features with active SI w/ plan. Patient notes that he never had any depression or SI until after his colostomy surgery. Seems to be averse to changing the bag or even looking at it.  -Psychiatry following  -Continue legal hold  -Patient is medical and transfer orders placed for medical floor.  -Transfer to an inpatient psychiatric facility pending, difficult to get a bed currently per CM    Major depressive disorder, recurrent, severe with psychotic features (HCC)  Assessment & Plan  -Psychiatry and Psychology following  -Per Psychiatry, continue modafinil to target depression, avoid SSRI's I.e fluoxetine due to increased risk of QTc prolongation  -Risperidone qhs  -Patient medically cleared    Colostomy present (HCC)- (present on admission)  Assessment & Plan  History of fall at home Nov 2020 on left side with perforated colon and splenic fluid collection/hematoma s/p segmental colectomy, colostomy, mobilization of the splenic flexure, wound vac, and I&D Nov 2020.   -home health orders placed for skilled nursing for colostomy bag  -contributing to his SI  -Surgery consulted to evaluate for reversal, unfortunately this is an outpatient procedure that he will have to follow-up with in the future    Orthostatic hypotension- (present on admission)  Assessment & Plan  Multifactorial including medications (beta blocker, CCB, tamsulosin), diabetic autonomic dysfunction, and venous insufficiency. Euvolemic. Echocardiogram shows improved EF 60% with no concerning valvular dysfunction. No history of seizures. Cosyntropin  test with cortisol >18 which rules out adrenal insufficiency.  Ambulated well with walker. Echo repeated this hospitalization showed EF of 60%.  -Hold BB, CCB, tamsulosin  -Per cardiology, no further cardiac work-up needed  -Midodrine 5mg QID  -Medically clear    Syncope  Assessment & Plan  See orthostatic hypotension section. Does have a history of frequent falls in the past several months.    Paroxysmal atrial fibrillation (HCC)  Assessment & Plan  CHADSVASC 3 (HTN, CHF, DM). Rates 60s-70s w/out beta blockade. TTE Feb 2021: EF 60%,   -Hold BB, CCB as above  -Digoxin restarted this hospitalization  -goal HR <110 at rest  -Replete Mg > 2, K > 4  -Start apixaban 5mg BID, benefits outweigh risks    Chronic HFrEF (heart failure with reduced ejection fraction) ([facility])  Assessment & Plan  EF 35% on Echo 2020, improved on TTE Feb 2021 to EF 60%. BNP >4k->downtrending.   -Lisinopril, lasix and Toprol XL held at this time; may restart upon discharge    Diabetes mellitus type 2, insulin dependent ([facility])  Assessment & Plan  Controlled given co-morbidities. A1c 7.8% Nov 2020, repeat 5.2% Feb 2021.  -Lantus 13u  -ISS/hypoglycemia protocol  -Dabetic diet  -Diabetes education    Hypothyroidism- (present on admission)  Assessment & Plan  TSH wnl Feb 2021  -continue home Levothyroxine    Essential hypertension- (present on admission)  Assessment & Plan  Monitor vitals.    Lower limb amputation, great toe ([facility])- (present on admission)  Assessment & Plan  s/p right great toe amputation 2018    Elevated troponin  Assessment & Plan  RESOLVED  Stable. Suspect due to demand ischemia. No need to trend further.    Debility- (present on admission)  Assessment & Plan  -PT/OT consults: recommend Home health PT. Ordered 4 wheeled walker with seat  -patient to ambulate three times daily out of bed on floor with assistance and out of bed to chair for meals.    Chest pain  Assessment & Plan  resolved    Chronic venous insufficiency of lower  extremity  Assessment & Plan  -elevated legs  -compression stockings  -lotion, soap/water to legs  -weight loss (BMI 38)    Nonischemic cardiomyopathy (HCC)- (present on admission)  Assessment & Plan  Normal coronaries, 2018.Tachycardia mediated  -LVEF improved, based on echo 2/10/2021 LVEF 60%    Mixed hyperlipidemia- (present on admission)  Assessment & Plan  LDL 82 Feb 2021  -repeat lipid panel  -continue Atorvatatin    Obesity- (present on admission)  Assessment & Plan  -Counseled on weight loss, exercise   -Nutrition consult      Code Status: Full  Diet: Diabetic  Lines/Tubes/Drains:Colostomy bag  IVF: n/a  Prophylaxis:  DVT: apixaban  GI: n/a  Disposition: Inpatient psych facility

## 2021-02-27 LAB
GLUCOSE BLD-MCNC: 108 MG/DL (ref 65–99)
GLUCOSE BLD-MCNC: 88 MG/DL (ref 65–99)
GLUCOSE BLD-MCNC: 99 MG/DL (ref 65–99)

## 2021-02-27 PROCEDURE — A9270 NON-COVERED ITEM OR SERVICE: HCPCS | Performed by: PSYCHIATRY & NEUROLOGY

## 2021-02-27 PROCEDURE — 99232 SBSQ HOSP IP/OBS MODERATE 35: CPT | Mod: GC | Performed by: HOSPITALIST

## 2021-02-27 PROCEDURE — A9270 NON-COVERED ITEM OR SERVICE: HCPCS | Performed by: GENERAL PRACTICE

## 2021-02-27 PROCEDURE — 770001 HCHG ROOM/CARE - MED/SURG/GYN PRIV*

## 2021-02-27 PROCEDURE — 700102 HCHG RX REV CODE 250 W/ 637 OVERRIDE(OP): Performed by: GENERAL PRACTICE

## 2021-02-27 PROCEDURE — 700102 HCHG RX REV CODE 250 W/ 637 OVERRIDE(OP): Performed by: PHYSICIAN ASSISTANT

## 2021-02-27 PROCEDURE — 700102 HCHG RX REV CODE 250 W/ 637 OVERRIDE(OP): Performed by: PSYCHIATRY & NEUROLOGY

## 2021-02-27 PROCEDURE — 700102 HCHG RX REV CODE 250 W/ 637 OVERRIDE(OP): Performed by: HOSPITALIST

## 2021-02-27 PROCEDURE — A9270 NON-COVERED ITEM OR SERVICE: HCPCS | Performed by: PHYSICIAN ASSISTANT

## 2021-02-27 PROCEDURE — 99233 SBSQ HOSP IP/OBS HIGH 50: CPT | Performed by: PSYCHIATRY & NEUROLOGY

## 2021-02-27 PROCEDURE — A9270 NON-COVERED ITEM OR SERVICE: HCPCS | Performed by: HOSPITALIST

## 2021-02-27 PROCEDURE — 82962 GLUCOSE BLOOD TEST: CPT | Mod: 91

## 2021-02-27 PROCEDURE — A9270 NON-COVERED ITEM OR SERVICE: HCPCS | Performed by: INTERNAL MEDICINE

## 2021-02-27 PROCEDURE — 700102 HCHG RX REV CODE 250 W/ 637 OVERRIDE(OP): Performed by: INTERNAL MEDICINE

## 2021-02-27 RX ORDER — PAROXETINE HYDROCHLORIDE 20 MG/1
10 TABLET, FILM COATED ORAL DAILY
Status: DISCONTINUED | OUTPATIENT
Start: 2021-02-27 | End: 2021-03-07

## 2021-02-27 RX ORDER — ZIPRASIDONE HYDROCHLORIDE 60 MG/1
60 CAPSULE ORAL 2 TIMES DAILY
Status: DISCONTINUED | OUTPATIENT
Start: 2021-02-27 | End: 2021-07-09 | Stop reason: HOSPADM

## 2021-02-27 RX ORDER — INSULIN GLARGINE 100 [IU]/ML
11 INJECTION, SOLUTION SUBCUTANEOUS EVERY EVENING
Status: DISCONTINUED | OUTPATIENT
Start: 2021-02-27 | End: 2021-03-02

## 2021-02-27 RX ADMIN — DOCUSATE SODIUM 50 MG AND SENNOSIDES 8.6 MG 2 TABLET: 8.6; 5 TABLET, FILM COATED ORAL at 05:21

## 2021-02-27 RX ADMIN — MIDODRINE HYDROCHLORIDE 5 MG: 5 TABLET ORAL at 05:21

## 2021-02-27 RX ADMIN — DIGOXIN 125 MCG: 125 TABLET ORAL at 16:12

## 2021-02-27 RX ADMIN — MIDODRINE HYDROCHLORIDE 5 MG: 5 TABLET ORAL at 21:04

## 2021-02-27 RX ADMIN — MIDODRINE HYDROCHLORIDE 5 MG: 5 TABLET ORAL at 13:23

## 2021-02-27 RX ADMIN — NYSTATIN: 100000 POWDER TOPICAL at 05:21

## 2021-02-27 RX ADMIN — PAROXETINE HYDROCHLORIDE 10 MG: 20 TABLET, FILM COATED ORAL at 13:23

## 2021-02-27 RX ADMIN — INSULIN GLARGINE 11 UNITS: 100 INJECTION, SOLUTION SUBCUTANEOUS at 16:49

## 2021-02-27 RX ADMIN — APIXABAN 5 MG: 5 TABLET, FILM COATED ORAL at 16:12

## 2021-02-27 RX ADMIN — LEVOTHYROXINE SODIUM 112 MCG: 0.11 TABLET ORAL at 05:21

## 2021-02-27 RX ADMIN — MIDODRINE HYDROCHLORIDE 5 MG: 5 TABLET ORAL at 16:12

## 2021-02-27 RX ADMIN — ZIPRASIDONE HYDROCHLORIDE 60 MG: 60 CAPSULE ORAL at 21:04

## 2021-02-27 RX ADMIN — CLONAZEPAM 0.5 MG: 0.5 TABLET ORAL at 05:21

## 2021-02-27 RX ADMIN — CLONAZEPAM 0.5 MG: 0.5 TABLET ORAL at 16:12

## 2021-02-27 RX ADMIN — ATORVASTATIN CALCIUM 40 MG: 40 TABLET, FILM COATED ORAL at 16:12

## 2021-02-27 RX ADMIN — ZIPRASIDONE HYDROCHLORIDE 60 MG: 60 CAPSULE ORAL at 13:23

## 2021-02-27 RX ADMIN — DOCUSATE SODIUM 50 MG AND SENNOSIDES 8.6 MG 2 TABLET: 8.6; 5 TABLET, FILM COATED ORAL at 16:12

## 2021-02-27 RX ADMIN — APIXABAN 5 MG: 5 TABLET, FILM COATED ORAL at 05:21

## 2021-02-27 ASSESSMENT — ENCOUNTER SYMPTOMS
ABDOMINAL PAIN: 0
HALLUCINATIONS: 1
VOMITING: 0
BLURRED VISION: 0
DIZZINESS: 0
MYALGIAS: 0
PALPITATIONS: 0
DOUBLE VISION: 0
DEPRESSION: 1
CHILLS: 0
HEADACHES: 0
HEARTBURN: 0
BACK PAIN: 0
SHORTNESS OF BREATH: 0
NAUSEA: 0
FEVER: 0
COUGH: 0

## 2021-02-27 ASSESSMENT — FIBROSIS 4 INDEX: FIB4 SCORE: 2.38

## 2021-02-27 ASSESSMENT — LIFESTYLE VARIABLES: SUBSTANCE_ABUSE: 0

## 2021-02-27 NOTE — CARE PLAN
Problem: Communication  Goal: The ability to communicate needs accurately and effectively will improve  Outcome: PROGRESSING AS EXPECTED     Problem: Safety  Goal: Will remain free from injury  Outcome: PROGRESSING AS EXPECTED  Goal: Will remain free from falls  Outcome: PROGRESSING AS EXPECTED     Problem: Psychosocial Needs:  Goal: Level of anxiety will decrease  Outcome: PROGRESSING AS EXPECTED     Problem: Potential for Self Harm  Goal: Improved mood and functioning  Outcome: PROGRESSING AS EXPECTED  Goal: No active self-harm  Outcome: PROGRESSING AS EXPECTED     Problem: Ineffective Coping  Goal: Improved mood and functioning  Outcome: PROGRESSING AS EXPECTED  Goal: No active self-harm  Outcome: PROGRESSING AS EXPECTED

## 2021-02-27 NOTE — PROGRESS NOTES
Acquired the patient from the nurse being sent home. Plan of care reviewed. Patient stable at this time but continues to experience suicidal ideation. Patient is a 1:1 and is currently sitting in chair quietly watching television.

## 2021-02-27 NOTE — CONSULTS
"PSYCHIATRIC FOLLOW-UP:(established)  *Reason for admission: Syncope, hypotension. Being followed for SI.    *Legal Hold Status: on legal hold                *HPI: E/M as noted yesterday risperdol and modafinil stopped because pt was having severe anxiety and it wasn't clear if it was akathisia from risperdol, modafinil or both. He wasn't able to sleep either.    Today he feels that his anxiety is \"alittle better\" and he doesn't feel as \"restless\" as yesterday. He also slept. Reviewed hx of meds: paxil worked best for him and is agreable to going back on it. Has not been on geodon.    QTc is 464.     Psychotherapy face to face: 21 m: reviewed feelings around ostomy, self image, thinking strategies, and how they interplay into mood.etc.     *Psychiatric Examination:   Vitals:   Vitals:    02/26/21 1612 02/26/21 2018 02/27/21 0425 02/27/21 0735   BP: 141/80 135/81 127/73 122/68   Pulse: 82 72 69 70   Resp: 18 18 16 16   Temp: 36.4 °C (97.6 °F) 36 °C (96.8 °F) 35.8 °C (96.5 °F) 36.3 °C (97.4 °F)   TempSrc: Temporal Temporal Temporal Temporal   SpO2: 98% 96% 98% 99%   Weight:  124 kg (273 lb 13 oz)     Height:       General Appearance:  good eye contact  Abnormal Movements: none   Gait and Posture: sitting in chair  Speech: soft  Thought Process: normal rate  Associations:   linear  Abnormal or Psychotic Thoughts: none overtly. Says they are still present.  Judgement and Insight: fair  Orientation: grossly intact  Recent and Remote Memory: grossly intact  Attention Span and Concentration: intact  Language:fluent  Fund of Knowledge: not tested  Mood and Affect: depressed but interestingly looks more euthymic  SI/HI:  suicidal - yes and homicidal - no      *ASSESSMENT/RECOMENDATIONS:  1. Major depression recurrent with psychosis (the latter seems resolved)  - modafinil 150 mg dc'd yesterday  -risperdone 0.5 mg hs dc'd yesterday  -starting paxil 10 mg. Target is 20 mg  - starting geodon 60 mg bid for AH  klonopin 0.5 mg bid "          2. Medical  -ostomy  -HTN  -pafib  -CHF  -DM2  -hypothyroidism     Legal hold: extended  Observation status: 1:1 sitter  Privileges (while on legal hold): unchanged      *Will Follow

## 2021-02-27 NOTE — CARE PLAN
Problem: Communication  Goal: The ability to communicate needs accurately and effectively will improve  Outcome: PROGRESSING AS EXPECTED     Problem: Safety  Goal: Will remain free from injury  Outcome: PROGRESSING AS EXPECTED  Goal: Will remain free from falls  Outcome: PROGRESSING AS EXPECTED     Problem: Discharge Barriers/Planning  Goal: Patient's continuum of care needs will be met  Outcome: PROGRESSING SLOWER THAN EXPECTED  Pt prompting stay in hospital d/t SI

## 2021-02-27 NOTE — PROGRESS NOTES
Daily Progress Note    Date of Service: 2/27/2021  Primary Team: [facility] White Team   Attending: Dr. Gonzalez   Senior Resident: Dr. Ley  Intern: Dr. Bowden  Contact:  [phone]    ID: 55 yo male admitted for recurrent syncope and fall secondary to chronic orthostatic hypotension, currently on legal hold for suicidal ideation.    Chief Complaint: suicidal ideation    Subjective:  Interval Updates: psych recs to pause efforts of having patient doing own ostomy care due to worsening depression     No acute events overnight. Again states that he feels well other than depression and suicidal thoughts. He does report that this is slightly improved today. He denies having any acute needs at this time. Remains agreeable to plan for continued placement efforts to inpatient psychiatry. Denies any pain.    Consultants/Specialty:  Psychiatry    Review of Systems:   Review of Systems   Constitutional: Negative for chills and fever.   Eyes: Negative for blurred vision and double vision.   Respiratory: Negative for cough and shortness of breath.    Cardiovascular: Negative for chest pain and palpitations.   Gastrointestinal: Negative for abdominal pain, heartburn, nausea and vomiting.   Genitourinary: Negative for dysuria and urgency.   Musculoskeletal: Negative for back pain and myalgias.   Skin: Negative for itching and rash.   Neurological: Negative for dizziness and headaches.   Psychiatric/Behavioral: Positive for depression, hallucinations (auditory) and suicidal ideas. Negative for substance abuse.       Objective Data:   Physical Exam:   Vitals:   Temp:  [35.8 °C (96.5 °F)-36.4 °C (97.6 °F)] 36.3 °C (97.4 °F)  Pulse:  [69-82] 70  Resp:  [16-18] 16  BP: (122-141)/(68-81) 122/68  SpO2:  [96 %-99 %] 99 %    Physical Exam  Constitutional:       General: He is not in acute distress.     Appearance: Normal appearance. He is obese.   HENT:      Head: Normocephalic and atraumatic.      Right Ear: External ear normal.       Left Ear: External ear normal.      Nose: Nose normal. No congestion.      Mouth/Throat:      Mouth: Mucous membranes are moist.      Pharynx: Oropharynx is clear.   Eyes:      Extraocular Movements: Extraocular movements intact.      Pupils: Pupils are equal, round, and reactive to light.   Cardiovascular:      Rate and Rhythm: Normal rate and regular rhythm.      Heart sounds: No murmur. No friction rub. No gallop.    Pulmonary:      Effort: Pulmonary effort is normal. No respiratory distress.      Breath sounds: Normal breath sounds.   Abdominal:      General: Abdomen is flat.      Palpations: Abdomen is soft.      Tenderness: There is no abdominal tenderness. There is no guarding.      Comments: colostomy   Musculoskeletal:         General: No swelling or deformity.      Cervical back: Normal range of motion and neck supple. No rigidity.   Skin:     General: Skin is warm and dry.      Capillary Refill: Capillary refill takes less than 2 seconds.   Neurological:      General: No focal deficit present.      Mental Status: He is alert and oriented to person, place, and time.      Cranial Nerves: No cranial nerve deficit.      Motor: No weakness.      Gait: Gait abnormal (uses walker).   Psychiatric:      Comments: Tearful at times         Labs:   No results for input(s): SODIUM, POTASSIUM, CHLORIDE, CO2, GLUCOSE, BUN, CPKTOTAL in the last 72 hours.   No results for input(s): ALBUMIN, TBILIRUBIN, ALKPHOSPHAT, TOTPROTEIN, ALTSGPT, ASTSGOT, CREATININE in the last 72 hours.   No results for input(s): WBC, RBC, HEMOGLOBIN, HEMATOCRIT, MCV, MCH, RDW, PLATELETCT, MPV, NEUTSPOLYS, LYMPHOCYTES, MONOCYTES, EOSINOPHILS, BASOPHILS, RBCMORPHOLO in the last 72 hours.     Imaging:   EC-ECHOCARDIOGRAM COMPLETE W/O CONT   Final Result      DX-LUMBAR SPINE-2 OR 3 VIEWS   Final Result      Moderate compression deformity of T11 is new compared to 2018.      Mild wedge deformity of T12 is unchanged.      Degenerative changes including  facet arthropathy.      Mild retrolisthesis of L5 on S1 and L3 on L4.              Problem Representation:     55 yo male admitted for recurrent syncope and fall secondary to chronic orthostatic hypotension, currently on legal hold for suicidal ideation. Pending placement to inpatient psych facility.    * Suicidal ideation  Assessment & Plan  Has history of MDD, recurrent with psychotic features with active SI w/ plan. Patient notes that he never had any depression or SI until after his colostomy surgery. Seems to be averse to changing the bag or even looking at it.  -Psychiatry following  -Continue legal hold  -Pause self-care of colostomy  -Patient is medical and transfer orders placed for medical floor.  -Transfer to an inpatient psychiatric facility pending, difficult to get a bed currently per CM    Major depressive disorder, recurrent, severe with psychotic features (HCC)  Assessment & Plan  -Psychiatry and Psychology following  -Per psychiatry, restlessness could be due to modafinil or risperidone, hold both until further eval  -Clonazepam 0.5mg BID per psych  -Patient medically cleared    Colostomy present (HCC)- (present on admission)  Assessment & Plan  History of fall at home Nov 2020 on left side with perforated colon and splenic fluid collection/hematoma s/p segmental colectomy, colostomy, mobilization of the splenic flexure, wound vac, and I&D Nov 2020.   -home health orders placed for skilled nursing for colostomy bag  -contributing to his SI  -Surgery consulted to evaluate for reversal, unfortunately this is an outpatient procedure that he will have to follow-up with in the future    Orthostatic hypotension- (present on admission)  Assessment & Plan  Multifactorial including medications (beta blocker, CCB, tamsulosin), diabetic autonomic dysfunction, and venous insufficiency. Euvolemic. Echocardiogram shows improved EF 60% with no concerning valvular dysfunction. No history of seizures. Cosyntropin  test with cortisol >18 which rules out adrenal insufficiency.  Ambulated well with walker. Echo repeated this hospitalization showed EF of 60%.  -Hold BB, CCB, tamsulosin  -Per cardiology, no further cardiac work-up needed  -Midodrine 5mg QID  -Medically clear    Syncope  Assessment & Plan  See orthostatic hypotension section. Does have a history of frequent falls in the past several months.    Paroxysmal atrial fibrillation (HCC)  Assessment & Plan  CHADSVASC 3 (HTN, CHF, DM). Rates 60s-70s w/out beta blockade. TTE Feb 2021: EF 60%,   -Hold BB, CCB as above  -Digoxin restarted this hospitalization  -goal HR <110 at rest  -Replete Mg > 2, K > 4  -Start apixaban 5mg BID, benefits outweigh risks    Chronic HFrEF (heart failure with reduced ejection fraction) ([facility])  Assessment & Plan  EF 35% on Echo 2020, improved on TTE Feb 2021 to EF 60%. BNP >4k->downtrending.   -Lisinopril, lasix and Toprol XL held at this time; may restart upon discharge    Diabetes mellitus type 2, insulin dependent ([facility])  Assessment & Plan  Controlled given co-morbidities. A1c 7.8% Nov 2020, repeat 5.2% Feb 2021.  -Lantus 13u  -ISS/hypoglycemia protocol  -Dabetic diet  -Diabetes education    Hypothyroidism- (present on admission)  Assessment & Plan  TSH wnl Feb 2021  -continue home Levothyroxine    Essential hypertension- (present on admission)  Assessment & Plan  Monitor vitals.    Lower limb amputation, great toe ([facility])- (present on admission)  Assessment & Plan  s/p right great toe amputation 2018    Elevated troponin  Assessment & Plan  RESOLVED  Stable. Suspect due to demand ischemia. No need to trend further.    Debility- (present on admission)  Assessment & Plan  -PT/OT consults: recommend Home health PT. Ordered 4 wheeled walker with seat  -patient to ambulate three times daily out of bed on floor with assistance and out of bed to chair for meals.    Chest pain  Assessment & Plan  resolved    Chronic venous insufficiency of lower  extremity  Assessment & Plan  -elevated legs  -compression stockings  -lotion, soap/water to legs  -weight loss (BMI 38)    Nonischemic cardiomyopathy (HCC)- (present on admission)  Assessment & Plan  Normal coronaries, 2018.Tachycardia mediated  -LVEF improved, based on echo 2/10/2021 LVEF 60%    Mixed hyperlipidemia- (present on admission)  Assessment & Plan  LDL 82 Feb 2021  -repeat lipid panel  -continue Atorvatatin    Obesity- (present on admission)  Assessment & Plan  -Counseled on weight loss, exercise   -Nutrition consult      Code Status: Full  Diet: Diabetic  Lines/Tubes/Drains:Colostomy bag  IVF: n/a  Prophylaxis:  DVT: apixaban  GI: n/a  Disposition: Inpatient psych facility    Nate Bowden M.D.

## 2021-02-27 NOTE — PROGRESS NOTES
Bedside report received.Fall precautions in place. Patient in bed. Patient alert and oriented x4. Call light within reach. Bed in low and locked position. Safety sitter at bedside. POC reviewed with patient. No further questions or needs at this time.

## 2021-02-27 NOTE — CONSULTS
BRIEF PSYCHIATRIC CONSULT NOTE: not seen however case discussed with Dr Fernandez and pt is very anxious, pulled out some hair, not sleeping. Will see tomorrow but since this could be related to modafinil, or akathesia from riseprdol, will hold both until further notice and provide clonazepam which will be reassessed tomorrow.

## 2021-02-28 LAB
GLUCOSE BLD-MCNC: 113 MG/DL (ref 65–99)
GLUCOSE BLD-MCNC: 114 MG/DL (ref 65–99)
GLUCOSE BLD-MCNC: 116 MG/DL (ref 65–99)

## 2021-02-28 PROCEDURE — 700102 HCHG RX REV CODE 250 W/ 637 OVERRIDE(OP): Performed by: PHYSICIAN ASSISTANT

## 2021-02-28 PROCEDURE — 90832 PSYTX W PT 30 MINUTES: CPT | Performed by: PSYCHOLOGIST

## 2021-02-28 PROCEDURE — 770004 HCHG ROOM/CARE - ONCOLOGY PRIVATE *

## 2021-02-28 PROCEDURE — A9270 NON-COVERED ITEM OR SERVICE: HCPCS | Performed by: INTERNAL MEDICINE

## 2021-02-28 PROCEDURE — 700102 HCHG RX REV CODE 250 W/ 637 OVERRIDE(OP): Performed by: HOSPITALIST

## 2021-02-28 PROCEDURE — A9270 NON-COVERED ITEM OR SERVICE: HCPCS | Performed by: GENERAL PRACTICE

## 2021-02-28 PROCEDURE — 700102 HCHG RX REV CODE 250 W/ 637 OVERRIDE(OP): Performed by: GENERAL PRACTICE

## 2021-02-28 PROCEDURE — 700102 HCHG RX REV CODE 250 W/ 637 OVERRIDE(OP): Performed by: INTERNAL MEDICINE

## 2021-02-28 PROCEDURE — 82962 GLUCOSE BLOOD TEST: CPT

## 2021-02-28 PROCEDURE — A9270 NON-COVERED ITEM OR SERVICE: HCPCS | Performed by: PSYCHIATRY & NEUROLOGY

## 2021-02-28 PROCEDURE — 99232 SBSQ HOSP IP/OBS MODERATE 35: CPT | Mod: GC | Performed by: HOSPITALIST

## 2021-02-28 PROCEDURE — 700102 HCHG RX REV CODE 250 W/ 637 OVERRIDE(OP): Performed by: PSYCHIATRY & NEUROLOGY

## 2021-02-28 PROCEDURE — A9270 NON-COVERED ITEM OR SERVICE: HCPCS | Performed by: PHYSICIAN ASSISTANT

## 2021-02-28 PROCEDURE — A9270 NON-COVERED ITEM OR SERVICE: HCPCS | Performed by: HOSPITALIST

## 2021-02-28 PROCEDURE — 99233 SBSQ HOSP IP/OBS HIGH 50: CPT | Performed by: PSYCHIATRY & NEUROLOGY

## 2021-02-28 RX ADMIN — MIDODRINE HYDROCHLORIDE 5 MG: 5 TABLET ORAL at 04:57

## 2021-02-28 RX ADMIN — LEVOTHYROXINE SODIUM 112 MCG: 0.11 TABLET ORAL at 04:57

## 2021-02-28 RX ADMIN — MIDODRINE HYDROCHLORIDE 5 MG: 5 TABLET ORAL at 18:02

## 2021-02-28 RX ADMIN — MIDODRINE HYDROCHLORIDE 5 MG: 5 TABLET ORAL at 11:43

## 2021-02-28 RX ADMIN — APIXABAN 5 MG: 5 TABLET, FILM COATED ORAL at 18:02

## 2021-02-28 RX ADMIN — CLONAZEPAM 0.5 MG: 0.5 TABLET ORAL at 18:01

## 2021-02-28 RX ADMIN — ATORVASTATIN CALCIUM 40 MG: 40 TABLET, FILM COATED ORAL at 18:01

## 2021-02-28 RX ADMIN — DOCUSATE SODIUM 50 MG AND SENNOSIDES 8.6 MG 2 TABLET: 8.6; 5 TABLET, FILM COATED ORAL at 04:57

## 2021-02-28 RX ADMIN — MIDODRINE HYDROCHLORIDE 5 MG: 5 TABLET ORAL at 20:52

## 2021-02-28 RX ADMIN — INSULIN GLARGINE 11 UNITS: 100 INJECTION, SOLUTION SUBCUTANEOUS at 18:02

## 2021-02-28 RX ADMIN — ZIPRASIDONE HYDROCHLORIDE 60 MG: 60 CAPSULE ORAL at 20:52

## 2021-02-28 RX ADMIN — APIXABAN 5 MG: 5 TABLET, FILM COATED ORAL at 04:57

## 2021-02-28 RX ADMIN — PAROXETINE HYDROCHLORIDE 10 MG: 20 TABLET, FILM COATED ORAL at 04:57

## 2021-02-28 RX ADMIN — DOCUSATE SODIUM 50 MG AND SENNOSIDES 8.6 MG 2 TABLET: 8.6; 5 TABLET, FILM COATED ORAL at 18:01

## 2021-02-28 RX ADMIN — ZIPRASIDONE HYDROCHLORIDE 60 MG: 60 CAPSULE ORAL at 07:46

## 2021-02-28 RX ADMIN — DIGOXIN 125 MCG: 125 TABLET ORAL at 18:01

## 2021-02-28 RX ADMIN — CLONAZEPAM 0.5 MG: 0.5 TABLET ORAL at 04:57

## 2021-02-28 ASSESSMENT — ENCOUNTER SYMPTOMS
CHILLS: 0
FEVER: 0
DOUBLE VISION: 0
BLURRED VISION: 0
VOMITING: 0
COUGH: 0
MYALGIAS: 0
HEADACHES: 0
BACK PAIN: 0
DEPRESSION: 1
PALPITATIONS: 0
DIZZINESS: 0
HEARTBURN: 0
ABDOMINAL PAIN: 0
HALLUCINATIONS: 1
SHORTNESS OF BREATH: 0
NAUSEA: 0

## 2021-02-28 ASSESSMENT — LIFESTYLE VARIABLES: SUBSTANCE_ABUSE: 0

## 2021-02-28 NOTE — PROGRESS NOTES
Assumed care at 1900, bedside report received from day shift RN. Pt is medical. Initial assessment completed, orders reviewed, call light within reach, and hourly rounding in place. POC addressed with patient, no additional questions at this time.

## 2021-02-28 NOTE — CARE PLAN
Problem: Communication  Goal: The ability to communicate needs accurately and effectively will improve  Outcome: PROGRESSING AS EXPECTED  Intervention: Educate patient and significant other/support system about the plan of care, procedures, treatments, medications and allow for questions  Note: Patient educated on plan of care at start of shift     Problem: Mobility  Goal: Risk for activity intolerance will decrease  Outcome: PROGRESSING AS EXPECTED  Intervention: Assess and monitor signs of activity intolerance  Note: Patient has been provided assistance with mobility throughout the shift and has remained free from injury.

## 2021-02-28 NOTE — PROGRESS NOTES
2 RN Skin Check    2 RN skin check complete.   Devices in place: na  Skin assessed under devices: N\A.  Confirmed pressure ulcers found on: N/A.  New potential pressure ulcers noted on N/A. Wound consult placed N/A.  The following interventions in place Pillows.

## 2021-02-28 NOTE — CONSULTS
"PSYCHOLOGICAL FOLLOW-UP:  Reason for admission: Syncope and collapse [R55]  Length of Visit: 30min    Legal status: Legal Status: Involuntary    Chief Complaint: \"I still have them.\" (them=SI)    HPI: Met with the patient for brief individual psychotherapy. Patient presented with a tired affect and reported a \"ok\" mood. No psychomotor agitation noted today. He continues to experience suicidal thoughts however the intensity of them has decreased. He continues to hear mood congruent voices however again, the intensity has decreased. Per chart review and the patient's own report, he is sleeping somewhat better. Thought content is less depressed and hopeless. Patient made statements such as, \"I just need to wait it out.\" and briefly spoke about his belief that he can get better. Agreed to start titrating his exposure to his ostomy care again. Will see him again on Friday.     Psychiatric Examination:  Vitals: Blood pressure 131/66, pulse (!) 55, temperature 36.3 °C (97.3 °F), temperature source Temporal, resp. rate 16, height 1.829 m (6'), weight (!) 126 kg (278 lb 10.6 oz), SpO2 99 %.  Musculoskeletal: normal psychomotor activity, no tics or unusual mannerisms noted  Appearance and Eye Contact: appropriate dress and grooming. Behavior is calm, cooperative,  appropriate eye contact  Attention/Alertness: Alert  Thought Process: Linear, Logical and Goal Directed    Thought Content: No psychotic processes noted, he did experience mood congruent AH last night  Speech: Clear with normal rate and rhythm  Mood: \"ok\"            Affect: tired         SI/HI: Endorses    Memory: Recent and remote memory appear intact    Orientation: alert, oriented to person, place and time  Insight into symptoms: good  Judgement into symptoms:good    ASSESSMENT: While the patient's symptoms have begun to improve, he is still experiencing suicidal thoughts and is unable to contract for safety. Legal hold remains extended.    Will slowly expose " patient back to his ostomy care. See plan for more information.    DSM5 Diagnostic Considerations:   Major Depressive Disorder, recurrent, severe, with psychotic features  Unspecified Anxiety Disorder    PLAN:  Legal status: Involuntary  Anticipate F/U when back on the service, Friday, 3/5/21    Patient can now participate in his ostomy care. Please offer him the option of doing his own care. He has already agreed to trying to perform whatever care he can to decrease anxiety associated with it.    Records reviewed: yes  Discussed patient with other provider: yes, team psychiatrist   Will continue to follow  Thank you for the consult.    Madhuri Fernandez, Ph.D.

## 2021-02-28 NOTE — CONSULTS
"PSYCHIATRIC FOLLOW-UP:(established)  *Reason for admission: Syncope, hypotension. Being followed for SI.   *Legal Hold Status: on legal hold                *HPI: his anxiety is still better, a \"6\" (bad side\", heard mood congruent AH last pm to kill self, SI, depressed though less so. No side effects to meds.             *Psychiatric Examination: unchanged.  Vitals:   Vitals:    02/27/21 1600 02/27/21 1856 02/28/21 0404 02/28/21 0717   BP: 144/78 118/74 131/65 131/66   Pulse: 88 64 (!) 52 (!) 55   Resp: 17 15 17 16   Temp: 37.1 °C (98.8 °F) 36.8 °C (98.2 °F) 36.4 °C (97.5 °F) 36.3 °C (97.3 °F)   TempSrc: Temporal Temporal Temporal Temporal   SpO2: 98% 94% 94% 99%   Weight:  (!) 126 kg (278 lb 10.6 oz)     Height:              *ASSESSMENT/RECOMENDATIONS:  1. Major depression recurrent with mood congruent psychosis     -paxil 10 mg. Target is 20 mg  -geodon 60 mg bid for AH  -klonopin 0.5 mg bid           2. Medical  -ostomy  -HTN  -pafib  -CHF  -DM2  -hypothyroidism     Legal hold: extended  Observation status: 1:1 sitter  Privileges (while on legal hold):unchanged      *Will Follow    "

## 2021-02-28 NOTE — PROGRESS NOTES
Daily Progress Note    Date of Service: 2/28/2021  Primary Team: [facility] White Team   Attending: Dr. Gonzalez   Senior Resident: Dr. Ley  Intern: Dr. Bowden  Contact:  [phone]    ID: 57 yo male admitted for recurrent syncope and fall secondary to chronic orthostatic hypotension, currently on legal hold for suicidal ideation.    Chief Complaint: suicidal ideation    Subjective:  Interval Updates: KT    Continues to state that he feels well other than depression and suicidal thoughts. He cannot tell if the psych medication regimen has had any effect yet. Continues to deny having any acute needs at this time. Denies any pain.    Consultants/Specialty:  Psychiatry    Review of Systems:   Review of Systems   Constitutional: Negative for chills and fever.   Eyes: Negative for blurred vision and double vision.   Respiratory: Negative for cough and shortness of breath.    Cardiovascular: Negative for chest pain and palpitations.   Gastrointestinal: Negative for abdominal pain, heartburn, nausea and vomiting.   Genitourinary: Negative for dysuria and urgency.   Musculoskeletal: Negative for back pain and myalgias.   Skin: Negative for itching and rash.   Neurological: Negative for dizziness and headaches.   Psychiatric/Behavioral: Positive for depression, hallucinations (auditory) and suicidal ideas. Negative for substance abuse.       Objective Data:   Physical Exam:   Vitals:   Temp:  [36.3 °C (97.3 °F)-37.1 °C (98.8 °F)] 36.3 °C (97.3 °F)  Pulse:  [52-88] 55  Resp:  [15-17] 16  BP: (118-144)/(65-78) 131/66  SpO2:  [94 %-99 %] 99 %    Physical Exam  Constitutional:       General: He is not in acute distress.     Appearance: Normal appearance. He is obese.      Comments: Sitting up in chair   HENT:      Head: Normocephalic and atraumatic.      Right Ear: External ear normal.      Left Ear: External ear normal.      Nose: Nose normal. No congestion.      Mouth/Throat:      Mouth: Mucous membranes are moist.       Pharynx: Oropharynx is clear.   Eyes:      Extraocular Movements: Extraocular movements intact.      Pupils: Pupils are equal, round, and reactive to light.   Cardiovascular:      Rate and Rhythm: Normal rate and regular rhythm.      Heart sounds: No murmur. No friction rub. No gallop.    Pulmonary:      Effort: Pulmonary effort is normal. No respiratory distress.      Breath sounds: Normal breath sounds.   Abdominal:      General: Abdomen is flat.      Palpations: Abdomen is soft.      Tenderness: There is no abdominal tenderness. There is no guarding.      Comments: colostomy   Musculoskeletal:         General: No swelling or deformity.      Cervical back: Normal range of motion and neck supple. No rigidity.   Skin:     General: Skin is warm and dry.      Capillary Refill: Capillary refill takes less than 2 seconds.   Neurological:      General: No focal deficit present.      Mental Status: He is alert and oriented to person, place, and time.      Cranial Nerves: No cranial nerve deficit.      Motor: No weakness.      Gait: Gait abnormal (uses walker).   Psychiatric:      Comments: Tearful at times         Labs:   No results for input(s): SODIUM, POTASSIUM, CHLORIDE, CO2, GLUCOSE, BUN, CPKTOTAL in the last 72 hours.   No results for input(s): ALBUMIN, TBILIRUBIN, ALKPHOSPHAT, TOTPROTEIN, ALTSGPT, ASTSGOT, CREATININE in the last 72 hours.   No results for input(s): WBC, RBC, HEMOGLOBIN, HEMATOCRIT, MCV, MCH, RDW, PLATELETCT, MPV, NEUTSPOLYS, LYMPHOCYTES, MONOCYTES, EOSINOPHILS, BASOPHILS, RBCMORPHOLO in the last 72 hours.     Imaging:   EC-ECHOCARDIOGRAM COMPLETE W/O CONT   Final Result      DX-LUMBAR SPINE-2 OR 3 VIEWS   Final Result      Moderate compression deformity of T11 is new compared to 2018.      Mild wedge deformity of T12 is unchanged.      Degenerative changes including facet arthropathy.      Mild retrolisthesis of L5 on S1 and L3 on L4.              Problem Representation:     57 yo male admitted for  recurrent syncope and fall secondary to chronic orthostatic hypotension, currently on legal hold for suicidal ideation. Pending placement to inpatient psych facility.    * Suicidal ideation  Assessment & Plan  Has history of MDD, recurrent with psychotic features with active SI w/ plan. Patient notes that he never had any depression or SI until after his colostomy surgery. Seems to be averse to changing the bag or even looking at it.  -Psychiatry following  -Continue legal hold  -Pause self-care of colostomy  -Patient is medical and transfer orders placed for medical floor.  -Transfer to an inpatient psychiatric facility pending, difficult to get a bed currently per CM    Major depressive disorder, recurrent, severe with psychotic features (HCC)  Assessment & Plan  -Psychiatry and Psychology following  -Per psychiatry, restlessness could be due to modafinil or risperidone, hold both until further eval  -Clonazepam 0.5mg BID per psych  -Patient medically cleared    Colostomy present (HCC)- (present on admission)  Assessment & Plan  History of fall at home Nov 2020 on left side with perforated colon and splenic fluid collection/hematoma s/p segmental colectomy, colostomy, mobilization of the splenic flexure, wound vac, and I&D Nov 2020.   -home health orders placed for skilled nursing for colostomy bag  -contributing to his SI  -Per surgery, reversal of ostomy is an outpatient procedure that he will have to follow-up with in the future after discharge    Orthostatic hypotension- (present on admission)  Assessment & Plan  Multifactorial including medications (beta blocker, CCB, tamsulosin), diabetic autonomic dysfunction, and venous insufficiency. Euvolemic. Echocardiogram shows improved EF 60% with no concerning valvular dysfunction. No history of seizures. Cosyntropin test with cortisol >18 which rules out adrenal insufficiency.  Ambulated well with walker. Echo repeated this hospitalization showed EF of 60%.  -Hold  BB, CCB, tamsulosin  -Per cardiology, no further cardiac work-up needed  -Midodrine 5mg QID  -Medically clear    Syncope  Assessment & Plan  See orthostatic hypotension section. Does have a history of frequent falls in the past several months.    Paroxysmal atrial fibrillation (HCC)  Assessment & Plan  CHADSVASC 3 (HTN, CHF, DM). Rates 60s-70s w/out beta blockade. TTE Feb 2021: EF 60%,   -Hold BB, CCB as above  -Digoxin restarted this hospitalization  -goal HR <110 at rest  -Replete Mg > 2, K > 4  -Start apixaban 5mg BID, benefits outweigh risks    Chronic HFrEF (heart failure with reduced ejection fraction) ([facility])  Assessment & Plan  EF 35% on Echo 2020, improved on TTE Feb 2021 to EF 60%. BNP >4k->downtrending.   -Lisinopril, lasix and Toprol XL held at this time; may restart upon discharge    Diabetes mellitus type 2, insulin dependent (HCC)  Assessment & Plan  Controlled given co-morbidities. A1c 7.8% Nov 2020, repeat 5.2% Feb 2021.  -Lantus 13u  -ISS/hypoglycemia protocol  -Dabetic diet  -Diabetes education    Hypothyroidism- (present on admission)  Assessment & Plan  TSH wnl Feb 2021  -continue home Levothyroxine    Essential hypertension- (present on admission)  Assessment & Plan  Monitor vitals.    Lower limb amputation, great toe (HCC)- (present on admission)  Assessment & Plan  s/p right great toe amputation 2018    Elevated troponin  Assessment & Plan  RESOLVED  Stable. Suspect due to demand ischemia. No need to trend further.    Debility- (present on admission)  Assessment & Plan  -PT/OT consults: recommend Home health PT. Ordered 4 wheeled walker with seat  -patient to ambulate three times daily out of bed on floor with assistance and out of bed to chair for meals.    Chest pain  Assessment & Plan  resolved    Chronic venous insufficiency of lower extremity  Assessment & Plan  -elevated legs  -compression stockings  -lotion, soap/water to legs  -weight loss (BMI 38)    Nonischemic cardiomyopathy (HCC)-  (present on admission)  Assessment & Plan  Normal coronaries, 2018.Tachycardia mediated  -LVEF improved, based on echo 2/10/2021 LVEF 60%    Mixed hyperlipidemia- (present on admission)  Assessment & Plan  LDL 82 Feb 2021  -repeat lipid panel  -continue Atorvatatin    Obesity- (present on admission)  Assessment & Plan  -Counseled on weight loss, exercise   -Nutrition consult      Code Status: Full  Diet: Diabetic  Lines/Tubes/Drains:Colostomy bag  IVF: n/a  Prophylaxis:  DVT: apixaban  GI: n/a  Disposition: Inpatient psych facility    Nate Bowden M.D.

## 2021-03-01 LAB
GLUCOSE BLD-MCNC: 104 MG/DL (ref 65–99)
GLUCOSE BLD-MCNC: 110 MG/DL (ref 65–99)
GLUCOSE BLD-MCNC: 88 MG/DL (ref 65–99)

## 2021-03-01 PROCEDURE — 700102 HCHG RX REV CODE 250 W/ 637 OVERRIDE(OP): Performed by: HOSPITALIST

## 2021-03-01 PROCEDURE — 82962 GLUCOSE BLOOD TEST: CPT | Mod: 91

## 2021-03-01 PROCEDURE — A9270 NON-COVERED ITEM OR SERVICE: HCPCS | Performed by: PHYSICIAN ASSISTANT

## 2021-03-01 PROCEDURE — 700102 HCHG RX REV CODE 250 W/ 637 OVERRIDE(OP): Performed by: PHYSICIAN ASSISTANT

## 2021-03-01 PROCEDURE — A9270 NON-COVERED ITEM OR SERVICE: HCPCS | Performed by: HOSPITALIST

## 2021-03-01 PROCEDURE — 700102 HCHG RX REV CODE 250 W/ 637 OVERRIDE(OP): Performed by: INTERNAL MEDICINE

## 2021-03-01 PROCEDURE — 770004 HCHG ROOM/CARE - ONCOLOGY PRIVATE *

## 2021-03-01 PROCEDURE — A9270 NON-COVERED ITEM OR SERVICE: HCPCS | Performed by: INTERNAL MEDICINE

## 2021-03-01 PROCEDURE — 99232 SBSQ HOSP IP/OBS MODERATE 35: CPT | Performed by: HOSPITALIST

## 2021-03-01 PROCEDURE — 700102 HCHG RX REV CODE 250 W/ 637 OVERRIDE(OP): Performed by: PSYCHIATRY & NEUROLOGY

## 2021-03-01 PROCEDURE — A9270 NON-COVERED ITEM OR SERVICE: HCPCS | Performed by: GENERAL PRACTICE

## 2021-03-01 PROCEDURE — A9270 NON-COVERED ITEM OR SERVICE: HCPCS | Performed by: PSYCHIATRY & NEUROLOGY

## 2021-03-01 PROCEDURE — 700102 HCHG RX REV CODE 250 W/ 637 OVERRIDE(OP): Performed by: GENERAL PRACTICE

## 2021-03-01 RX ORDER — MIDODRINE HYDROCHLORIDE 5 MG/1
5 TABLET ORAL
Status: DISCONTINUED | OUTPATIENT
Start: 2021-03-01 | End: 2021-03-05

## 2021-03-01 RX ADMIN — MIDODRINE HYDROCHLORIDE 5 MG: 5 TABLET ORAL at 20:04

## 2021-03-01 RX ADMIN — ZIPRASIDONE HYDROCHLORIDE 60 MG: 60 CAPSULE ORAL at 20:04

## 2021-03-01 RX ADMIN — PAROXETINE HYDROCHLORIDE 10 MG: 20 TABLET, FILM COATED ORAL at 04:45

## 2021-03-01 RX ADMIN — LEVOTHYROXINE SODIUM 112 MCG: 0.11 TABLET ORAL at 04:45

## 2021-03-01 RX ADMIN — INSULIN GLARGINE 11 UNITS: 100 INJECTION, SOLUTION SUBCUTANEOUS at 17:50

## 2021-03-01 RX ADMIN — CLONAZEPAM 0.5 MG: 0.5 TABLET ORAL at 04:45

## 2021-03-01 RX ADMIN — APIXABAN 5 MG: 5 TABLET, FILM COATED ORAL at 04:48

## 2021-03-01 RX ADMIN — MIDODRINE HYDROCHLORIDE 5 MG: 5 TABLET ORAL at 04:45

## 2021-03-01 RX ADMIN — DOCUSATE SODIUM 50 MG AND SENNOSIDES 8.6 MG 2 TABLET: 8.6; 5 TABLET, FILM COATED ORAL at 04:45

## 2021-03-01 RX ADMIN — APIXABAN 5 MG: 5 TABLET, FILM COATED ORAL at 17:50

## 2021-03-01 RX ADMIN — ATORVASTATIN CALCIUM 40 MG: 40 TABLET, FILM COATED ORAL at 17:49

## 2021-03-01 RX ADMIN — CLONAZEPAM 0.5 MG: 0.5 TABLET ORAL at 17:49

## 2021-03-01 RX ADMIN — DIGOXIN 125 MCG: 125 TABLET ORAL at 17:49

## 2021-03-01 RX ADMIN — ZIPRASIDONE HYDROCHLORIDE 60 MG: 60 CAPSULE ORAL at 08:51

## 2021-03-01 ASSESSMENT — ENCOUNTER SYMPTOMS
PALPITATIONS: 0
ABDOMINAL PAIN: 1
FEVER: 0
ORTHOPNEA: 0
NAUSEA: 0
HEMOPTYSIS: 0
HEADACHES: 0
MYALGIAS: 0
VOMITING: 0
DEPRESSION: 0
BLURRED VISION: 0
SPUTUM PRODUCTION: 0
HEARTBURN: 0
COUGH: 0

## 2021-03-01 NOTE — WOUND TEAM
"[facility]   Inpatient Services   Established Ostomy Management/ troubleshooting  HPI: Reviewed  PMH: Reviewed   SH: Reviewed   Reason for Ostomy nurse consult:  Established colostomy    Subjective: \"I'll try it today.\"    Colostomy 11/10/20 Transverse LUQ (Active)   Wound Image   02/24/21 0900   Stomal Appliance Assessment Changed    Stoma Assessment Pink    Stoma Shape Budded Less Than One Inch;Oval    Stoma Size (in) 1.25    Peristomal Assessment Denuded    Mucocutaneous Junction Intact    Treatment Appliance Changed;Site care    Peristomal Protectant No Sting Skin Prep;Stoma Powder;Paste Ring    Stomal Appliance 2 1/4\" (57mm) CTF;Paste Ring, 2\"    Output (mL) 0 mL    WOUND RN ONLY - Stomal Appliance  2 Piece;2 1/4\" (57mm) CTF;Paste Ring, 2\"    Appliance (Pouch) # [id number]    Appliance Brand Daisy        Ostomy Appliance (type and size): 2 1/4\" 2 piece with Paste Ring     Interventions and Education: Patient removed appliance. He helped this RN cleanse peristomal skin with warm wash cloth. RN demonstrated crusting x2 with stoma powder and no sting. Used previous template and cut barrier, paste ring applied around opening then barrier applied around stoma. Patient rubbed to adhere and closed end of bag.       Evaluation: Viable stoma, producing stool. Per CNA report at bedside, appliance was leaking earlier and CNA changed appliance. Patient observed all steps today and participated with hands on care and did well. Discussed with MD on possibility for reversal. Per surgery, reversal of ostomy is an outpatient procedure that he will have to follow-up with in the future after discharge    2/26: Pt very down today, reports that psych has not seen him. Pt kept turning head away from ostomy today but did verbalized he will try to do hands on Sunday.     2/24/21: Pt unable to transfer to inpatient [facility] until he can care for colostomy independently. Ostomy RNs were asked to return for education. Pt " extremely depressed today. Pt states he feels like an animal and that the colostomy is messy and smelly and he would rather die then care for it. This RN provided understanding and attempted to explain that colostomy is manageable. 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 Pt had colostomy created on November 10, 2020 by Dr. Desouza. May want to consider contacting surgery for possible reversal related to pts extreme body dysmorphia (this is pts 2nd admit for SI related to colostomy) and inability for pt to DC due to pts inability/unwillingness to care for colostomy - Discussed with Dr. Ley ([facility] Resident).           Plan: Wound team to continue with teaching every 3-4 days to help teach patient how to care for appliance so that he can discharge to psych.    Anticipated discharge needs: patient would benefit from SNF, outpatient clinic or Home health for follow up care. Patient needs to follow up with Dr. Desouza after discharge to plan for ostomy reversal surgery.

## 2021-03-01 NOTE — DISCHARGE PLANNING
Anticipated Discharge Disposition: Pending acceptance at [facility].     Action: LSW completed chart review. Pt on legal hold, inititated on 2/11/2021 @ 0845, extended until 3/4/2021. Pt has ostomy, learning how to care for himself.     Barriers to Discharge: Acceptance at inpatient [facility] facility, ostomy, [place] [employer] insurance.     Plan: F/u with [facility].

## 2021-03-01 NOTE — HOSPITAL COURSE
Mr. Castro is a 56 year-old man with a past medical history significant for atrial fibrillation, secondary hypercoagulable state, diabetes mellitus complicated by diabetic neuropathy, chronic systolic CHF, colostomy placement, orthostatic hypotension (maintained on florinef), and dyslipidemia who presented to the ED on 2/9/2021 after he became dizzy and had a syncopal episode at home.  In the ED he was noted to be hypotensive and bradycardic. Cardiology was consulted and recommended stopping all AV sanket blocking medications.  He remained orthostatic during his hospitalization so midodrine was started and then transit to fludrocortisone bid. BP stabilized after.  An echocardiogram was obtained and showed mild mitral regurgitation and an EF of 60% which was improved from his previous echocardiogram obtained on 11/2020 (previously 35%).  CTAs of the head and neck were done and were unremarkable outside of distal bilateral ICA tortuosity.  Also during his hospitalization he expressed desire to kill himself. Psychiatry consulted, medication adjusted and legal hold placed.   He has a colostomy and has become overwhelmed by caring for it. He is now pending [facility] placement at the 6-month db for maturity of his colostomy. And he is medically cleared for psychaittric inpatient treatment.

## 2021-03-01 NOTE — DISCHARGE PLANNING
@0935  Agency/Facility Name: [facility]  Outcome: Voice mail left regarding referral. Requested a call back    @7315  Agency/Facility Name: [facility]  Outcome: Voice mail left regarding referral. Requested a call back.

## 2021-03-01 NOTE — PROGRESS NOTES
[facility] Medicine Daily Progress Note    Date of Service  3/1/2021    Chief Complaint  56 y.o. male admitted 2/9/2021 with   Chief Complaint   Patient presents with   • Syncope     X1 this morning when standing up to go to bathroom         Hospital Course  This is a 56-year-old man with a past medical history significant for atrial fibrillation, secondary hypercoagulable state, diabetes mellitus with glyco of 5.2 complicated with diabetic neuropathy, history of congestive systolic heart failure, dyslipidemia presented to the ER on 2/9/2021 after he had a syncopal episode.    Patient does have a history of orthostatic hypotension and was started on fludrocortisone.  Prior to the syncopal episode, patient was complaining of dizziness. upon presentation in ER, patient was hypotensive along with bradycardia thus cardiology was consulted and evaluated and started to stopping all AV sanket blocking agent.  During the stay in the hospital, patient orthostatic vital signs continue to remain positive-was a started on midodrine 5 mg every 6 hours    Currently patient is on digoxin 0.125 mg p.o. daily for rate control and Eliquis 5 mg p.o. twice daily for secondary hypercoagulable state    Echocardiogram was obtained which showed EF of 60%, improved from previous echocardiogram obtained on 11/2020.    Psych continues to follow the patient; psychiatric medication as per psych recommendation.  At this time patient medically cleared to be discharged to inpatient psych    Interval Problem Update  No acute events overnight, laying in bed, denies any complaint.   --- Patient has been medically cleared to be discharged to inpatient psych, secondary to follow the patient.    Consultants/Specialty  Psychiatry, cardiology    Code Status  Full Code    Disposition  To inpatient psychiatry    Review of Systems  Review of Systems   Constitutional: Positive for malaise/fatigue. Negative for fever.   HENT: Negative for congestion.    Eyes:  Negative for blurred vision.   Respiratory: Negative for cough, hemoptysis and sputum production.    Cardiovascular: Negative for chest pain, palpitations and orthopnea.   Gastrointestinal: Positive for abdominal pain (abdominal disconfort). Negative for heartburn, nausea and vomiting.   Genitourinary: Negative for dysuria.   Musculoskeletal: Negative for myalgias.   Neurological: Negative for headaches.   Psychiatric/Behavioral: Negative for depression.        Physical Exam  Temp:  [36.1 °C (97 °F)-36.8 °C (98.3 °F)] 36.8 °C (98.3 °F)  Pulse:  [60-77] 65  Resp:  [16-18] 18  BP: (133-148)/(70-90) 134/89  SpO2:  [91 %-99 %] 91 %    Physical Exam  Vitals and nursing note reviewed.   Constitutional:       Appearance: He is obese.   HENT:      Head: Normocephalic and atraumatic.   Eyes:      Extraocular Movements: Extraocular movements intact.   Cardiovascular:      Rate and Rhythm: Normal rate.      Pulses: Normal pulses.   Pulmonary:      Effort: Pulmonary effort is normal.      Breath sounds: Normal breath sounds.      Comments: Decrease breath sound at the bases  Abdominal:      General: Abdomen is flat. Bowel sounds are normal. There is no distension.      Tenderness: There is no abdominal tenderness.      Comments: Does have ostomy in place  Also noted to have wound  With granulation tissue    Musculoskeletal:      Cervical back: Neck supple.      Right lower leg: No edema.      Left lower leg: No edema.   Neurological:      Mental Status: He is alert and oriented to person, place, and time.      Cranial Nerves: No cranial nerve deficit.         Fluids    Intake/Output Summary (Last 24 hours) at 3/1/2021 1347  Last data filed at 3/1/2021 1300  Gross per 24 hour   Intake 520 ml   Output 1275 ml   Net -755 ml       Laboratory                        Imaging  EC-ECHOCARDIOGRAM COMPLETE W/O CONT   Final Result      DX-LUMBAR SPINE-2 OR 3 VIEWS   Final Result      Moderate compression deformity of T11 is new compared to  2018.      Mild wedge deformity of T12 is unchanged.      Degenerative changes including facet arthropathy.      Mild retrolisthesis of L5 on S1 and L3 on L4.              Assessment/Plan  * Suicidal ideation  Assessment & Plan  Has history of MDD, recurrent with psychotic features with active SI w/ plan.   --Psych following, continue cycling medication as per psych recommendation.    Patient is medically cleared to be transferred to inpatient psych    Major depressive disorder, recurrent, severe with psychotic features ([facility])  Assessment & Plan  -Psychiatry and Psychology following   -Psych medication as per psych recommendation    Colostomy present ([facility])- (present on admission)  Assessment & Plan  History of fall at home Nov 2020 on left side with perforated colon and splenic fluid collection/hematoma s/p segmental colectomy, colostomy, mobilization of the splenic flexure, wound vac, and I&D Nov 2020.   -Continue to provide education for colostomy care.  -Per surgery, reversal of ostomy is an outpatient procedure that he will have to follow-up with in the future after discharge    Orthostatic hypotension- (present on admission)  Assessment & Plan  Multifactorial including medications (beta blocker, CCB, tamsulosin), diabetic autonomic dysfunction, and venous insufficiency   --EF is noted to be 60%, significant improvement from prior.   --Per cardiology, no further cardiac work-up needed  -Patient need to follow-up with cardiology as an outpatient.      Syncope  Assessment & Plan  Obtain orthostatic vital sign.  Currently patient is admitted in 5 mg p.o. twice daily    Paroxysmal atrial fibrillation ([facility])  Assessment & Plan  CHADSVASC 3 (HTN, CHF, DM). Rates 60s-70s w/out beta blockade. TTE Feb 2021: EF 60%,   -Continue digoxin 0.125 mg p.o. daily, Eliquis 5 mg p.o. twice daily      Chronic HFrEF (heart failure with reduced ejection fraction) ([facility])  Assessment & Plan  EF will repeat echo.  Patient is currently  euvolemic.    --Will consider starting low-dose of lisinopril 5 mg p.o. daily    I/Os, daily weight and fluid restriction to 1.5 L    Diabetes mellitus type 2, insulin dependent (HCC)  Assessment & Plan  Controlled given co-morbidities. A1c 7.8% Nov 2020, repeat 5.2% Feb 2021.   -ISS/hypoglycemia protocol and lantus   -Dabetic diet   -Diabetes education    Hypothyroidism- (present on admission)  Assessment & Plan  TSH wnl Feb 2021  -continue home Levothyroxine    Essential hypertension- (present on admission)  Assessment & Plan  Blood pressure well controlled, monitor.  Decrease midodrine  5 mg p.o. twice daily.  Obtain orthostatic vital sign    Lower limb amputation, great toe (HCC)- (present on admission)  Assessment & Plan  s/p right great toe amputation 2018    Elevated troponin  Assessment & Plan  RESOLVED  Stable. Suspect due to demand ischemia. No need to trend further.    Debility- (present on admission)  Assessment & Plan  -PT/OT evaluated and appreciated recs    Chest pain  Assessment & Plan  resolved    Chronic venous insufficiency of lower extremity  Assessment & Plan  -elevated legs  -compression stockings  -lotion, soap/water to legs  -weight loss (BMI 38)    Nonischemic cardiomyopathy (HCC)- (present on admission)  Assessment & Plan  Normal coronaries, 2018.Tachycardia mediated  -LVEF improved, based on echo 2/10/2021 LVEF 60%    Mixed hyperlipidemia- (present on admission)  Assessment & Plan  LDL 82 Feb 2021  -repeat lipid panel  -continue Atorvatatin    Obesity- (present on admission)  Assessment & Plan  -Counseled on weight loss, exercise   -Nutrition consult       VTE prophylaxis:  eliquis

## 2021-03-02 PROBLEM — R07.9 CHEST PAIN: Status: RESOLVED | Noted: 2021-02-16 | Resolved: 2021-03-02

## 2021-03-02 LAB
ALBUMIN SERPL BCP-MCNC: 3.5 G/DL (ref 3.2–4.9)
ALBUMIN/GLOB SERPL: 0.9 G/DL
ALP SERPL-CCNC: 100 U/L (ref 30–99)
ALT SERPL-CCNC: 23 U/L (ref 2–50)
ANION GAP SERPL CALC-SCNC: 13 MMOL/L (ref 7–16)
AST SERPL-CCNC: 20 U/L (ref 12–45)
BASOPHILS # BLD AUTO: 0.7 % (ref 0–1.8)
BASOPHILS # BLD: 0.07 K/UL (ref 0–0.12)
BILIRUB SERPL-MCNC: 0.3 MG/DL (ref 0.1–1.5)
BUN SERPL-MCNC: 28 MG/DL (ref 8–22)
CALCIUM SERPL-MCNC: 9.8 MG/DL (ref 8.5–10.5)
CHLORIDE SERPL-SCNC: 103 MMOL/L (ref 96–112)
CO2 SERPL-SCNC: 22 MMOL/L (ref 20–33)
CREAT SERPL-MCNC: 1.37 MG/DL (ref 0.5–1.4)
EOSINOPHIL # BLD AUTO: 0.46 K/UL (ref 0–0.51)
EOSINOPHIL NFR BLD: 4.5 % (ref 0–6.9)
ERYTHROCYTE [DISTWIDTH] IN BLOOD BY AUTOMATED COUNT: 44.7 FL (ref 35.9–50)
GLOBULIN SER CALC-MCNC: 3.7 G/DL (ref 1.9–3.5)
GLUCOSE BLD-MCNC: 120 MG/DL (ref 65–99)
GLUCOSE BLD-MCNC: 126 MG/DL (ref 65–99)
GLUCOSE SERPL-MCNC: 110 MG/DL (ref 65–99)
HCT VFR BLD AUTO: 45.8 % (ref 42–52)
HGB BLD-MCNC: 14.9 G/DL (ref 14–18)
IMM GRANULOCYTES # BLD AUTO: 0.05 K/UL (ref 0–0.11)
IMM GRANULOCYTES NFR BLD AUTO: 0.5 % (ref 0–0.9)
LYMPHOCYTES # BLD AUTO: 2.83 K/UL (ref 1–4.8)
LYMPHOCYTES NFR BLD: 27.7 % (ref 22–41)
MCH RBC QN AUTO: 28.6 PG (ref 27–33)
MCHC RBC AUTO-ENTMCNC: 32.5 G/DL (ref 33.7–35.3)
MCV RBC AUTO: 87.9 FL (ref 81.4–97.8)
MONOCYTES # BLD AUTO: 0.71 K/UL (ref 0–0.85)
MONOCYTES NFR BLD AUTO: 6.9 % (ref 0–13.4)
NEUTROPHILS # BLD AUTO: 6.1 K/UL (ref 1.82–7.42)
NEUTROPHILS NFR BLD: 59.7 % (ref 44–72)
NRBC # BLD AUTO: 0 K/UL
NRBC BLD-RTO: 0 /100 WBC
PLATELET # BLD AUTO: 243 K/UL (ref 164–446)
PMV BLD AUTO: 11.2 FL (ref 9–12.9)
POTASSIUM SERPL-SCNC: 4.1 MMOL/L (ref 3.6–5.5)
PROT SERPL-MCNC: 7.2 G/DL (ref 6–8.2)
RBC # BLD AUTO: 5.21 M/UL (ref 4.7–6.1)
SODIUM SERPL-SCNC: 138 MMOL/L (ref 135–145)
WBC # BLD AUTO: 10.2 K/UL (ref 4.8–10.8)

## 2021-03-02 PROCEDURE — A9270 NON-COVERED ITEM OR SERVICE: HCPCS | Performed by: PSYCHIATRY & NEUROLOGY

## 2021-03-02 PROCEDURE — 36415 COLL VENOUS BLD VENIPUNCTURE: CPT

## 2021-03-02 PROCEDURE — 700102 HCHG RX REV CODE 250 W/ 637 OVERRIDE(OP): Performed by: HOSPITALIST

## 2021-03-02 PROCEDURE — A9270 NON-COVERED ITEM OR SERVICE: HCPCS | Performed by: HOSPITALIST

## 2021-03-02 PROCEDURE — 700102 HCHG RX REV CODE 250 W/ 637 OVERRIDE(OP): Performed by: GENERAL PRACTICE

## 2021-03-02 PROCEDURE — 80053 COMPREHEN METABOLIC PANEL: CPT

## 2021-03-02 PROCEDURE — 85025 COMPLETE CBC W/AUTO DIFF WBC: CPT

## 2021-03-02 PROCEDURE — 82962 GLUCOSE BLOOD TEST: CPT | Mod: 91

## 2021-03-02 PROCEDURE — A9270 NON-COVERED ITEM OR SERVICE: HCPCS | Performed by: GENERAL PRACTICE

## 2021-03-02 PROCEDURE — 770004 HCHG ROOM/CARE - ONCOLOGY PRIVATE *

## 2021-03-02 PROCEDURE — 700102 HCHG RX REV CODE 250 W/ 637 OVERRIDE(OP): Performed by: PSYCHIATRY & NEUROLOGY

## 2021-03-02 PROCEDURE — 99232 SBSQ HOSP IP/OBS MODERATE 35: CPT | Performed by: INTERNAL MEDICINE

## 2021-03-02 PROCEDURE — 700102 HCHG RX REV CODE 250 W/ 637 OVERRIDE(OP): Performed by: INTERNAL MEDICINE

## 2021-03-02 PROCEDURE — A9270 NON-COVERED ITEM OR SERVICE: HCPCS | Performed by: INTERNAL MEDICINE

## 2021-03-02 RX ORDER — INSULIN GLARGINE 100 [IU]/ML
10 INJECTION, SOLUTION SUBCUTANEOUS EVERY EVENING
Status: DISCONTINUED | OUTPATIENT
Start: 2021-03-02 | End: 2021-03-07

## 2021-03-02 RX ADMIN — APIXABAN 5 MG: 5 TABLET, FILM COATED ORAL at 17:44

## 2021-03-02 RX ADMIN — DOCUSATE SODIUM 50 MG AND SENNOSIDES 8.6 MG 2 TABLET: 8.6; 5 TABLET, FILM COATED ORAL at 17:44

## 2021-03-02 RX ADMIN — ZIPRASIDONE HYDROCHLORIDE 60 MG: 60 CAPSULE ORAL at 20:14

## 2021-03-02 RX ADMIN — MIDODRINE HYDROCHLORIDE 5 MG: 5 TABLET ORAL at 20:14

## 2021-03-02 RX ADMIN — ZIPRASIDONE HYDROCHLORIDE 60 MG: 60 CAPSULE ORAL at 09:57

## 2021-03-02 RX ADMIN — PAROXETINE HYDROCHLORIDE 10 MG: 20 TABLET, FILM COATED ORAL at 05:40

## 2021-03-02 RX ADMIN — APIXABAN 5 MG: 5 TABLET, FILM COATED ORAL at 05:40

## 2021-03-02 RX ADMIN — MIDODRINE HYDROCHLORIDE 5 MG: 5 TABLET ORAL at 09:57

## 2021-03-02 RX ADMIN — CLONAZEPAM 0.5 MG: 0.5 TABLET ORAL at 17:44

## 2021-03-02 RX ADMIN — INSULIN GLARGINE 10 UNITS: 100 INJECTION, SOLUTION SUBCUTANEOUS at 18:00

## 2021-03-02 RX ADMIN — CLONAZEPAM 0.5 MG: 0.5 TABLET ORAL at 05:40

## 2021-03-02 RX ADMIN — DIGOXIN 125 MCG: 125 TABLET ORAL at 17:45

## 2021-03-02 RX ADMIN — ATORVASTATIN CALCIUM 40 MG: 40 TABLET, FILM COATED ORAL at 17:45

## 2021-03-02 RX ADMIN — LEVOTHYROXINE SODIUM 112 MCG: 0.11 TABLET ORAL at 05:40

## 2021-03-02 ASSESSMENT — ENCOUNTER SYMPTOMS
CONSTIPATION: 0
BLURRED VISION: 0
DIZZINESS: 0
WEAKNESS: 0
VOMITING: 0
SORE THROAT: 0
ABDOMINAL PAIN: 0
PALPITATIONS: 0
COUGH: 0
CHILLS: 0
DEPRESSION: 1
FALLS: 0
NAUSEA: 0
FEVER: 0
HEADACHES: 0
DIARRHEA: 0
SHORTNESS OF BREATH: 0

## 2021-03-02 ASSESSMENT — PAIN DESCRIPTION - PAIN TYPE: TYPE: ACUTE PAIN

## 2021-03-02 NOTE — DISCHARGE PLANNING
Agency/Facility Name: [facility]  Spoke To: Lena   Outcome: Referral not received. DPA resent.

## 2021-03-02 NOTE — DISCHARGE PLANNING
LSW made aware by Loulou MOTTA, that [facility] reports they do not have the referral. Requested DPA re-send ASAP.

## 2021-03-02 NOTE — DISCHARGE PLANNING
Legal Hold     Referral: Legal Hold Court     Intervention: Pt presented for legal hold meeting with  via video conferencing to discuss legal options of contesting hold and meeting with the court doctors tomorrow afternoon, or not contesting legal hold and continuing the hold for one week.  advised that pt's legal hold will be be continued for one week (3/11).       Plan: Pt's legal hold has been continued for one week. Pt awaiting transfer to an in patient psych facility.

## 2021-03-02 NOTE — DISCHARGE PLANNING
Anticipated Discharge Disposition: Inpatient psych facility, [facility]    Action: Student LUNA called, [phone], left VM inquiring about what number the pt is on the wait list. Requested a call back at [phone].    Barriers to Discharge: Bed availability    Plan: LUNA/BRANDON continue to f/u with [facility].      **Note reviewed and approved by Caitlin Banuelos RN CM

## 2021-03-02 NOTE — PROGRESS NOTES
Hospital Medicine Daily Progress Note    Date of Service  3/2/2021    Chief Complaint  56 y.o. male admitted 2/9/2021 with   Chief Complaint   Patient presents with   • Syncope     X1 this morning when standing up to go to bathroom         Hospital Course  Mr. Sebastián Castro is a 56 y.o. male history of atrial fibrillation secondary to hypercoagulable state, diabetes complicated by diabetic neuropathy, congestive heart failure, hyperlipidemia, orthostatic hypotension on fludrocortisone who presented on 2/9/2021 with syncopal episode.  Patient reported dizziness prior to syncope.  On presentation to the emergency room she was hypertensive and bradycardic.  Cardiology recommended stopping all AV sanket blocking agents.  He was also started on midodrine for hypotension.    The patient was started on digoxin and apixaban for atrial fibrillation.    Echocardiogram was obtained which showed EF of 60%, improved from previous echocardiogram obtained on 11/2020.    Psych continues to follow the patient; psychiatric medication as per psych recommendation.  At this time patient medically cleared to be discharged to inpatient psych    Interval Problem Update  Patient was seen and examined at bedside.  I have personally reviewed vitals, labs, and imaging.    3/2.  Afebrile.  Stable vitals.  On room air.  Denies fever, chills, chest pains, shortness of breath, dizziness, lightheadedness, palpitations.  Still reports depression and suicidal ideations.  He is now coming to  with having an ostomy.    Consultants/Specialty  Psychiatry, cardiology    Code Status  Full Code    Disposition  To inpatient psychiatry    Review of Systems  Review of Systems   Constitutional: Negative for chills and fever.   HENT: Negative for congestion and sore throat.    Eyes: Negative for blurred vision.   Respiratory: Negative for cough and shortness of breath.    Cardiovascular: Negative for chest pain and palpitations.   Gastrointestinal: Negative  for abdominal pain, constipation, diarrhea, nausea and vomiting.   Genitourinary: Negative for dysuria, frequency and urgency.   Musculoskeletal: Negative for falls.   Skin: Negative for rash.   Neurological: Negative for dizziness, weakness and headaches.   Psychiatric/Behavioral: Positive for depression and suicidal ideas.   All other systems reviewed and are negative.       Physical Exam  Temp:  [36.3 °C (97.3 °F)-37 °C (98.6 °F)] 36.8 °C (98.2 °F)  Pulse:  [60-85] 61  Resp:  [17-18] 17  BP: (121-148)/(58-90) 132/69  SpO2:  [93 %-98 %] 93 %    Physical Exam  Vitals and nursing note reviewed.   Constitutional:       General: He is not in acute distress.     Appearance: Normal appearance. He is obese.   HENT:      Head: Normocephalic and atraumatic.      Right Ear: External ear normal.      Left Ear: External ear normal.      Nose: Nose normal.      Mouth/Throat:      Mouth: Mucous membranes are moist.      Pharynx: Oropharynx is clear.   Eyes:      Extraocular Movements: Extraocular movements intact.      Conjunctiva/sclera: Conjunctivae normal.   Cardiovascular:      Rate and Rhythm: Normal rate and regular rhythm.      Pulses: Normal pulses.      Heart sounds: Normal heart sounds.   Pulmonary:      Effort: Pulmonary effort is normal. No respiratory distress.      Breath sounds: Normal breath sounds. No wheezing.   Abdominal:      General: Abdomen is flat. Bowel sounds are normal. There is no distension.      Palpations: Abdomen is soft.      Tenderness: There is no abdominal tenderness.      Comments: Does have ostomy in place  Also noted to have wound  With granulation tissue    Musculoskeletal:         General: Normal range of motion.      Cervical back: Normal range of motion.      Right lower leg: Edema present.      Left lower leg: Edema present.      Comments: Varicose veins and swelling of lower extremities   Skin:     General: Skin is warm.   Neurological:      General: No focal deficit present.       Mental Status: He is alert and oriented to person, place, and time. Mental status is at baseline.      Cranial Nerves: No cranial nerve deficit.      Motor: No weakness.   Psychiatric:         Behavior: Behavior normal.      Comments: Depressed mood         Fluids    Intake/Output Summary (Last 24 hours) at 3/2/2021 0818  Last data filed at 3/2/2021 0526  Gross per 24 hour   Intake 520 ml   Output 1300 ml   Net -780 ml       Laboratory  Recent Labs     03/02/21  0022   WBC 10.2   RBC 5.21   HEMOGLOBIN 14.9   HEMATOCRIT 45.8   MCV 87.9   MCH 28.6   MCHC 32.5*   RDW 44.7   PLATELETCT 243   MPV 11.2     Recent Labs     03/02/21  0022   SODIUM 138   POTASSIUM 4.1   CHLORIDE 103   CO2 22   GLUCOSE 110*   BUN 28*   CREATININE 1.37   CALCIUM 9.8                   Imaging  EC-ECHOCARDIOGRAM COMPLETE W/O CONT   Final Result      DX-LUMBAR SPINE-2 OR 3 VIEWS   Final Result      Moderate compression deformity of T11 is new compared to 2018.      Mild wedge deformity of T12 is unchanged.      Degenerative changes including facet arthropathy.      Mild retrolisthesis of L5 on S1 and L3 on L4.              Assessment/Plan  * Suicidal ideation  Assessment & Plan  Has history of MDD, recurrent with psychotic features with active SI w/ plan.   --Psych following, continue cycling medication as per psych recommendation.    Patient is medically cleared to be transferred to inpatient psych    Major depressive disorder, recurrent, severe with psychotic features (HCC)  Assessment & Plan  -Psychiatry and Psychology following   -Psych medication as per psych recommendation    Colostomy present (HCC)- (present on admission)  Assessment & Plan  History of fall at home Nov 2020 on left side with perforated colon and splenic fluid collection/hematoma s/p segmental colectomy, colostomy, mobilization of the splenic flexure, wound vac, and I&D Nov 2020.   -Continue to provide education for colostomy care.  -Per surgery, reversal of ostomy is an  outpatient procedure that he will have to follow-up with in the future after discharge    Orthostatic hypotension- (present on admission)  Assessment & Plan  Multifactorial including medications (beta blocker, CCB, tamsulosin), diabetic autonomic dysfunction, and venous insufficiency   --EF is noted to be 60%, significant improvement from prior.   --Per cardiology, no further cardiac work-up needed  -Patient need to follow-up with cardiology as an outpatient.    Syncope  Assessment & Plan  Likely secondary to orthostatic hypotension  Echo normal.    Paroxysmal atrial fibrillation (HCC)  Assessment & Plan  CHADSVASC 3 (HTN, CHF, DM). Rates 60s-70s w/out beta blockade. TTE Feb 2021: EF 60%,   -Continue digoxin 0.125 mg p.o. daily, Eliquis 5 mg p.o. twice daily    Chronic HFrEF (heart failure with reduced ejection fraction) ([facility])  Assessment & Plan  EF will repeat echo.  Patient is currently euvolemic.    I/Os, daily weight and fluid restriction to 1.5 L    Diabetes mellitus type 2, insulin dependent ([facility])  Assessment & Plan  Lab Results   Component Value Date/Time    HBA1C 5.2 02/14/2021 0130    HBA1C 7.8 (H) 11/10/2020 0331    HBA1C 7.7 (H) 10/22/2019 0823     Results from last 7 days   Lab Units 03/02/21  0802 03/01/21  1747 03/01/21  1327 03/01/21  0850 02/28/21  1733 02/28/21  1145 02/28/21  0747 02/27/21  1647   ACCU CHECK GLUCOSE 788 mg/dL 120* 110* 88 104* 113* 114* 116* 88     I have ordered insulin sliding scale with D50 and glucagon for hypoglycemia per protocol.  Diabetic diet  Diabetic education    Hypothyroidism- (present on admission)  Assessment & Plan  TSH wnl Feb 2021  -continue home Levothyroxine    Essential hypertension- (present on admission)  Assessment & Plan  Blood pressure well controlled, monitor.  Decrease midodrine  5 mg p.o. twice daily.  Obtain orthostatic vital sign    Lower limb amputation, great toe (HCC)- (present on admission)  Assessment & Plan  s/p right great toe amputation  2018    Elevated troponin  Assessment & Plan  RESOLVED  Stable. Suspect due to demand ischemia. No need to trend further.    Debility- (present on admission)  Assessment & Plan  -PT/OT evaluated and appreciated recs    Chronic venous insufficiency of lower extremity  Assessment & Plan  -elevated legs  -compression stockings  -lotion, soap/water to legs  -weight loss (BMI 38)    Nonischemic cardiomyopathy (HCC)- (present on admission)  Assessment & Plan  Normal coronaries, 2018.Tachycardia mediated  -LVEF improved, based on echo 2/10/2021 LVEF 60%    Mixed hyperlipidemia- (present on admission)  Assessment & Plan  LDL 82 Feb 2021  -repeat lipid panel  -continue Atorvatatin    Obesity- (present on admission)  Assessment & Plan  Body mass index is 37.79 kg/m².  Counseled about diet and exercise       VTE prophylaxis:  eliquis

## 2021-03-03 LAB
ANION GAP SERPL CALC-SCNC: 7 MMOL/L (ref 7–16)
BASOPHILS # BLD AUTO: 0.6 % (ref 0–1.8)
BASOPHILS # BLD: 0.07 K/UL (ref 0–0.12)
BUN SERPL-MCNC: 32 MG/DL (ref 8–22)
CALCIUM SERPL-MCNC: 9.7 MG/DL (ref 8.5–10.5)
CHLORIDE SERPL-SCNC: 98 MMOL/L (ref 96–112)
CO2 SERPL-SCNC: 23 MMOL/L (ref 20–33)
CORTIS SERPL-MCNC: 9.9 UG/DL (ref 0–23)
CREAT SERPL-MCNC: 1.29 MG/DL (ref 0.5–1.4)
EOSINOPHIL # BLD AUTO: 0.43 K/UL (ref 0–0.51)
EOSINOPHIL NFR BLD: 3.9 % (ref 0–6.9)
ERYTHROCYTE [DISTWIDTH] IN BLOOD BY AUTOMATED COUNT: 45 FL (ref 35.9–50)
GLUCOSE BLD-MCNC: 114 MG/DL (ref 65–99)
GLUCOSE BLD-MCNC: 129 MG/DL (ref 65–99)
GLUCOSE BLD-MCNC: 134 MG/DL (ref 65–99)
GLUCOSE BLD-MCNC: 135 MG/DL (ref 65–99)
GLUCOSE SERPL-MCNC: 133 MG/DL (ref 65–99)
HCT VFR BLD AUTO: 46.5 % (ref 42–52)
HGB BLD-MCNC: 15.6 G/DL (ref 14–18)
IMM GRANULOCYTES # BLD AUTO: 0.04 K/UL (ref 0–0.11)
IMM GRANULOCYTES NFR BLD AUTO: 0.4 % (ref 0–0.9)
LYMPHOCYTES # BLD AUTO: 2.88 K/UL (ref 1–4.8)
LYMPHOCYTES NFR BLD: 26.3 % (ref 22–41)
MAGNESIUM SERPL-MCNC: 1.7 MG/DL (ref 1.5–2.5)
MCH RBC QN AUTO: 29.4 PG (ref 27–33)
MCHC RBC AUTO-ENTMCNC: 33.5 G/DL (ref 33.7–35.3)
MCV RBC AUTO: 87.6 FL (ref 81.4–97.8)
MONOCYTES # BLD AUTO: 0.86 K/UL (ref 0–0.85)
MONOCYTES NFR BLD AUTO: 7.9 % (ref 0–13.4)
NEUTROPHILS # BLD AUTO: 6.65 K/UL (ref 1.82–7.42)
NEUTROPHILS NFR BLD: 60.9 % (ref 44–72)
NRBC # BLD AUTO: 0 K/UL
NRBC BLD-RTO: 0 /100 WBC
PHOSPHATE SERPL-MCNC: 4.1 MG/DL (ref 2.5–4.5)
PLATELET # BLD AUTO: 231 K/UL (ref 164–446)
PMV BLD AUTO: 10.5 FL (ref 9–12.9)
POTASSIUM SERPL-SCNC: 3.5 MMOL/L (ref 3.6–5.5)
RBC # BLD AUTO: 5.31 M/UL (ref 4.7–6.1)
SODIUM SERPL-SCNC: 128 MMOL/L (ref 135–145)
WBC # BLD AUTO: 10.9 K/UL (ref 4.8–10.8)

## 2021-03-03 PROCEDURE — A9270 NON-COVERED ITEM OR SERVICE: HCPCS | Performed by: INTERNAL MEDICINE

## 2021-03-03 PROCEDURE — 700111 HCHG RX REV CODE 636 W/ 250 OVERRIDE (IP): Performed by: HOSPITALIST

## 2021-03-03 PROCEDURE — 700102 HCHG RX REV CODE 250 W/ 637 OVERRIDE(OP): Performed by: INTERNAL MEDICINE

## 2021-03-03 PROCEDURE — A9270 NON-COVERED ITEM OR SERVICE: HCPCS | Performed by: PSYCHIATRY & NEUROLOGY

## 2021-03-03 PROCEDURE — 82533 TOTAL CORTISOL: CPT

## 2021-03-03 PROCEDURE — 700102 HCHG RX REV CODE 250 W/ 637 OVERRIDE(OP): Performed by: PSYCHIATRY & NEUROLOGY

## 2021-03-03 PROCEDURE — 36415 COLL VENOUS BLD VENIPUNCTURE: CPT

## 2021-03-03 PROCEDURE — 99233 SBSQ HOSP IP/OBS HIGH 50: CPT | Performed by: PSYCHIATRY & NEUROLOGY

## 2021-03-03 PROCEDURE — A9270 NON-COVERED ITEM OR SERVICE: HCPCS | Performed by: GENERAL PRACTICE

## 2021-03-03 PROCEDURE — A9270 NON-COVERED ITEM OR SERVICE: HCPCS | Performed by: HOSPITALIST

## 2021-03-03 PROCEDURE — 99232 SBSQ HOSP IP/OBS MODERATE 35: CPT | Performed by: INTERNAL MEDICINE

## 2021-03-03 PROCEDURE — 80048 BASIC METABOLIC PNL TOTAL CA: CPT

## 2021-03-03 PROCEDURE — 84100 ASSAY OF PHOSPHORUS: CPT

## 2021-03-03 PROCEDURE — 700102 HCHG RX REV CODE 250 W/ 637 OVERRIDE(OP): Performed by: HOSPITALIST

## 2021-03-03 PROCEDURE — 770004 HCHG ROOM/CARE - ONCOLOGY PRIVATE *

## 2021-03-03 PROCEDURE — 83735 ASSAY OF MAGNESIUM: CPT

## 2021-03-03 PROCEDURE — 82962 GLUCOSE BLOOD TEST: CPT | Mod: 91

## 2021-03-03 PROCEDURE — 85025 COMPLETE CBC W/AUTO DIFF WBC: CPT

## 2021-03-03 PROCEDURE — 700102 HCHG RX REV CODE 250 W/ 637 OVERRIDE(OP): Performed by: GENERAL PRACTICE

## 2021-03-03 RX ORDER — CLONAZEPAM 1 MG/1
1 TABLET ORAL EVERY EVENING
Status: DISCONTINUED | OUTPATIENT
Start: 2021-03-03 | End: 2021-07-09 | Stop reason: HOSPADM

## 2021-03-03 RX ORDER — CLONAZEPAM 0.5 MG/1
0.5 TABLET ORAL DAILY
Status: DISCONTINUED | OUTPATIENT
Start: 2021-03-04 | End: 2021-07-09 | Stop reason: HOSPADM

## 2021-03-03 RX ORDER — POTASSIUM CHLORIDE 20 MEQ/1
40 TABLET, EXTENDED RELEASE ORAL ONCE
Status: COMPLETED | OUTPATIENT
Start: 2021-03-03 | End: 2021-03-03

## 2021-03-03 RX ADMIN — ZIPRASIDONE HYDROCHLORIDE 60 MG: 60 CAPSULE ORAL at 21:18

## 2021-03-03 RX ADMIN — CLONAZEPAM 1 MG: 1 TABLET ORAL at 21:17

## 2021-03-03 RX ADMIN — DOCUSATE SODIUM 50 MG AND SENNOSIDES 8.6 MG 2 TABLET: 8.6; 5 TABLET, FILM COATED ORAL at 21:18

## 2021-03-03 RX ADMIN — Medication 400 MG: at 08:28

## 2021-03-03 RX ADMIN — PAROXETINE HYDROCHLORIDE 10 MG: 20 TABLET, FILM COATED ORAL at 04:58

## 2021-03-03 RX ADMIN — ONDANSETRON 4 MG: 4 TABLET, ORALLY DISINTEGRATING ORAL at 16:24

## 2021-03-03 RX ADMIN — POTASSIUM CHLORIDE 40 MEQ: 1500 TABLET, EXTENDED RELEASE ORAL at 08:28

## 2021-03-03 RX ADMIN — DIGOXIN 125 MCG: 125 TABLET ORAL at 21:17

## 2021-03-03 RX ADMIN — ONDANSETRON 4 MG: 2 INJECTION INTRAMUSCULAR; INTRAVENOUS at 13:58

## 2021-03-03 RX ADMIN — APIXABAN 5 MG: 5 TABLET, FILM COATED ORAL at 04:58

## 2021-03-03 RX ADMIN — ZIPRASIDONE HYDROCHLORIDE 60 MG: 60 CAPSULE ORAL at 08:28

## 2021-03-03 RX ADMIN — MIDODRINE HYDROCHLORIDE 5 MG: 5 TABLET ORAL at 08:28

## 2021-03-03 RX ADMIN — APIXABAN 5 MG: 5 TABLET, FILM COATED ORAL at 21:17

## 2021-03-03 RX ADMIN — INSULIN GLARGINE 10 UNITS: 100 INJECTION, SOLUTION SUBCUTANEOUS at 21:23

## 2021-03-03 RX ADMIN — ATORVASTATIN CALCIUM 40 MG: 40 TABLET, FILM COATED ORAL at 21:17

## 2021-03-03 RX ADMIN — DOCUSATE SODIUM 50 MG AND SENNOSIDES 8.6 MG 2 TABLET: 8.6; 5 TABLET, FILM COATED ORAL at 04:58

## 2021-03-03 RX ADMIN — PROCHLORPERAZINE EDISYLATE 10 MG: 5 INJECTION INTRAMUSCULAR; INTRAVENOUS at 17:29

## 2021-03-03 RX ADMIN — CLONAZEPAM 0.5 MG: 0.5 TABLET ORAL at 04:58

## 2021-03-03 RX ADMIN — LEVOTHYROXINE SODIUM 112 MCG: 0.11 TABLET ORAL at 04:58

## 2021-03-03 ASSESSMENT — ENCOUNTER SYMPTOMS
ABDOMINAL PAIN: 0
DIZZINESS: 0
VOMITING: 0
HEADACHES: 1
SORE THROAT: 0
CONSTIPATION: 0
NAUSEA: 0
HEADACHES: 0
SHORTNESS OF BREATH: 0
INSOMNIA: 1
HALLUCINATIONS: 1
CHILLS: 0
NERVOUS/ANXIOUS: 1
PALPITATIONS: 0
FEVER: 0
WEAKNESS: 0
COUGH: 0
FALLS: 0
BLURRED VISION: 0
DEPRESSION: 1
DIARRHEA: 0

## 2021-03-03 ASSESSMENT — PAIN DESCRIPTION - PAIN TYPE: TYPE: ACUTE PAIN

## 2021-03-03 NOTE — DISCHARGE PLANNING
Anticipated Discharge Disposition: Inpatient psych facility.     Action: Pt discussed in IDT rounds. LSW inquired if an ostomy reversal is possible for this pt, as it will be difficult to place him if he is not completely independent in caring for his ostomy. Dr. Sher reports he will consult with surgery. Per RN, pt was completely independent with ostomy prior to admission.     Pt is #3 on [facility] list.     Barriers to Discharge: Ostomy, psych placement, mental health, SI.     Plan: [facility] will continue to assist in care coordination, f/u with MD regarding ostomy reversal.

## 2021-03-03 NOTE — CARE PLAN
Problem: Safety  Goal: Will remain free from injury  Outcome: PROGRESSING AS EXPECTED  Intervention: Provide assistance with mobility  Note: Pt is A&Ox4, up standby assist with steady gait. Safety sitter at bedside. Room safety checklist in use. Pt education on precautions.      Problem: Psychosocial Needs:  Goal: Level of anxiety will decrease  Outcome: PROGRESSING AS EXPECTED  Intervention: Identify and develop with patient strategies to cope with anxiety triggers  Note: Pt is able to communicate needs appropriately. Reports that he is still hearing voices but denies having a plan to harm himself.

## 2021-03-03 NOTE — PROGRESS NOTES
[facility] Medicine Daily Progress Note    Date of Service  3/3/2021    Chief Complaint  56 y.o. male admitted 2/9/2021 with   Chief Complaint   Patient presents with   • Syncope     X1 this morning when standing up to go to bathroom         Hospital Course  Mr. Sebastián Castro is a 56 y.o. male history of atrial fibrillation secondary to hypercoagulable state, diabetes complicated by diabetic neuropathy, congestive heart failure, hyperlipidemia, orthostatic hypotension on fludrocortisone who presented on 2/9/2021 with syncopal episode.  Patient reported dizziness prior to syncope.  On presentation to the emergency room she was hypertensive and bradycardic.  Cardiology recommended stopping all AV sanket blocking agents.  He was also started on midodrine for hypotension.    The patient was started on digoxin and apixaban for atrial fibrillation.  Echocardiogram showed EF of 60%, improved from previous echocardiogram obtained on 11/2020.    Of note patient has history of a spleen injury complicated by perforation of splenic flexure in September 2020.  Status post colostomy and Interiano's pouch in November 2020.  He was at home taking care of his colostomy prior to most recent admission.  However he has become depressed, overwhelmed, and can no longer take care of his colostomy independently.    Psych continues to follow the patient; psychiatric medication as per psych recommendation.  At this time patient medically cleared to be discharged to inpatient psych    Interval Problem Update  Patient was seen and examined at bedside.  I have personally reviewed vitals, labs, and imaging.    3/2.  Afebrile.  Stable vitals.  On room air.  Denies fever, chills, chest pains, shortness of breath, dizziness, lightheadedness, palpitations.  Still reports depression and suicidal ideations.  He is now coming to  with having an ostomy.  3/3.  Afebrile.  Stable vitals.  On room air.  Replete mag, potassium.  Denies fever, chills, chest  pains, shortness of breath.  Still reports some depression and low energy.  Reports some suicidal ideations.  Does not feel that he can take care of his ostomy by himself but cannot give a reason as to why not.  Did speak with general surgery Dr. Storm Gan who felt that reversible of his ostomy is not likely at this time.    Consultants/Specialty  Psychiatry, cardiology    Code Status  Full Code    Disposition  To inpatient psychiatry    Review of Systems  Review of Systems   Constitutional: Negative for chills and fever.   HENT: Negative for congestion and sore throat.    Eyes: Negative for blurred vision.   Respiratory: Negative for cough and shortness of breath.    Cardiovascular: Negative for chest pain and palpitations.   Gastrointestinal: Negative for abdominal pain, constipation, diarrhea, nausea and vomiting.   Genitourinary: Negative for dysuria, frequency and urgency.   Musculoskeletal: Negative for falls.   Skin: Negative for rash.   Neurological: Negative for dizziness, weakness and headaches.   Psychiatric/Behavioral: Positive for depression and suicidal ideas.   All other systems reviewed and are negative.       Physical Exam  Temp:  [36.5 °C (97.7 °F)-36.7 °C (98.1 °F)] 36.7 °C (98.1 °F)  Pulse:  [65-72] 72  Resp:  [16-18] 18  BP: (112-129)/(65-74) 112/74  SpO2:  [93 %-97 %] 97 %    Physical Exam  Vitals and nursing note reviewed.   Constitutional:       General: He is not in acute distress.     Appearance: Normal appearance. He is obese.   HENT:      Head: Normocephalic and atraumatic.      Right Ear: External ear normal.      Left Ear: External ear normal.      Nose: Nose normal.      Mouth/Throat:      Mouth: Mucous membranes are moist.      Pharynx: Oropharynx is clear.   Eyes:      Extraocular Movements: Extraocular movements intact.      Conjunctiva/sclera: Conjunctivae normal.   Cardiovascular:      Rate and Rhythm: Normal rate and regular rhythm.      Pulses: Normal pulses.      Heart  sounds: Normal heart sounds.   Pulmonary:      Effort: Pulmonary effort is normal. No respiratory distress.      Breath sounds: Normal breath sounds. No wheezing.   Abdominal:      General: Abdomen is flat. Bowel sounds are normal. There is no distension.      Palpations: Abdomen is soft.      Tenderness: There is no abdominal tenderness.      Comments: Does have ostomy in place  Also noted to have wound  With granulation tissue    Musculoskeletal:         General: Normal range of motion.      Cervical back: Normal range of motion.      Right lower leg: Edema present.      Left lower leg: Edema present.      Comments: Varicose veins and swelling of lower extremities   Skin:     General: Skin is warm.   Neurological:      General: No focal deficit present.      Mental Status: He is alert and oriented to person, place, and time. Mental status is at baseline.      Cranial Nerves: No cranial nerve deficit.      Motor: No weakness.   Psychiatric:         Behavior: Behavior normal.      Comments: Depressed mood         Fluids    Intake/Output Summary (Last 24 hours) at 3/3/2021 0745  Last data filed at 3/3/2021 0500  Gross per 24 hour   Intake --   Output 900 ml   Net -900 ml       Laboratory  Recent Labs     03/02/21  0022 03/03/21  0017   WBC 10.2 10.9*   RBC 5.21 5.31   HEMOGLOBIN 14.9 15.6   HEMATOCRIT 45.8 46.5   MCV 87.9 87.6   MCH 28.6 29.4   MCHC 32.5* 33.5*   RDW 44.7 45.0   PLATELETCT 243 231   MPV 11.2 10.5     Recent Labs     03/02/21  0022 03/03/21  0017   SODIUM 138 128*   POTASSIUM 4.1 3.5*   CHLORIDE 103 98   CO2 22 23   GLUCOSE 110* 133*   BUN 28* 32*   CREATININE 1.37 1.29   CALCIUM 9.8 9.7                   Imaging  EC-ECHOCARDIOGRAM COMPLETE W/O CONT   Final Result      DX-LUMBAR SPINE-2 OR 3 VIEWS   Final Result      Moderate compression deformity of T11 is new compared to 2018.      Mild wedge deformity of T12 is unchanged.      Degenerative changes including facet arthropathy.      Mild  retrolisthesis of L5 on S1 and L3 on L4.              Assessment/Plan  * Suicidal ideation  Assessment & Plan  Has history of MDD, recurrent with psychotic features with active SI w/ plan.   --Psych following, continue cycling medication as per psych recommendation.    Patient is medically cleared to be transferred to inpatient psych    Major depressive disorder, recurrent, severe with psychotic features ([facility])  Assessment & Plan  -Psychiatry and Psychology following   -Psych medication as per psych recommendation    Colostomy present ([facility])- (present on admission)  Assessment & Plan  History of fall at home Nov 2020 on left side with perforated colon and splenic fluid collection/hematoma s/p segmental colectomy, colostomy, mobilization of the splenic flexure, wound vac, and I&D Nov 2020.   -Continue to provide education for colostomy care.  I did speak with [facility].  Reversal of ostomy is unlikely if it has not been 6 months.  Also needs further work-up such as a colonoscopy prior to reversal.    Orthostatic hypotension- (present on admission)  Assessment & Plan  Multifactorial including medications (beta blocker, CCB, tamsulosin), diabetic autonomic dysfunction, and venous insufficiency   --EF is noted to be 60%, significant improvement from prior.   --Per cardiology, no further cardiac work-up needed  -Patient need to follow-up with cardiology as an outpatient.    Syncope  Assessment & Plan  Likely secondary to orthostatic hypotension  Echo normal with 60% ejection fraction, improved from prior    Paroxysmal atrial fibrillation ([facility])  Assessment & Plan  CHADSVASC 3 (HTN, CHF, DM). Rates 60s-70s w/out beta blockade. TTE Feb 2021: EF 60%,   -Continue digoxin 0.125 mg p.o. daily, Eliquis 5 mg p.o. twice daily    Chronic HFrEF (heart failure with reduced ejection fraction) ([facility])  Assessment & Plan  EF 60% on repeat echo.  Patient is currently euvolemic.    I/Os, daily weight and fluid restriction to 1.5  L    Diabetes mellitus type 2, insulin dependent (HCC)  Assessment & Plan  Lab Results   Component Value Date/Time    HBA1C 5.2 02/14/2021 0130    HBA1C 7.8 (H) 11/10/2020 0331    HBA1C 7.7 (H) 10/22/2019 0823     Results from last 7 days   Lab Units 03/03/21  0830 03/02/21  1827 03/02/21  0802 03/01/21  1747 03/01/21  1327 03/01/21  0850 02/28/21  1733 02/28/21  1145   ACCU CHECK GLUCOSE 788 mg/dL 135* 126* 120* 110* 88 104* 113* 114*     I have ordered insulin sliding scale with D50 and glucagon for hypoglycemia per protocol.  Diabetic diet  Diabetic education    Hypothyroidism- (present on admission)  Assessment & Plan  TSH wnl Feb 2021  -continue home Levothyroxine    Essential hypertension- (present on admission)  Assessment & Plan  Blood pressure well controlled, monitor.    Has been hypotensive  Decrease midodrine  5 mg p.o. twice daily.      Lower limb amputation, great toe (HCC)- (present on admission)  Assessment & Plan  s/p right great toe amputation 2018    Elevated troponin  Assessment & Plan  RESOLVED  Stable. Suspect due to demand ischemia. No need to trend further.    Debility- (present on admission)  Assessment & Plan  -PT/OT evaluated and appreciated recs    Chronic venous insufficiency of lower extremity  Assessment & Plan  -elevated legs  -compression stockings  -lotion, soap/water to legs  -weight loss (BMI 38)    Nonischemic cardiomyopathy (HCC)- (present on admission)  Assessment & Plan  Normal coronaries, 2018.Tachycardia mediated  -LVEF improved, based on echo 2/10/2021 LVEF 60%    Mixed hyperlipidemia- (present on admission)  Assessment & Plan  LDL 82 Feb 2021  -repeat lipid panel  -continue Atorvatatin    Obesity- (present on admission)  Assessment & Plan  Body mass index is 37.79 kg/m².  Counseled about diet and exercise       VTE prophylaxis:  eliquis

## 2021-03-03 NOTE — CONSULTS
PSYCHIATRIC FOLLOW-UP:(established)  *Reason for admission:    Syncope, hypotension         *Legal Hold Status on Admission:   On hold         Chart reviewed.         *HPI: Patient is feeling very depressed and has intense suicidal ideation today. He states that he imagines burying a hatchet into his head multiple times a day and says that if his sitter was not there he would definitely not be able to control the urge to hurt himself. Patient says his auditory hallucinations have still been commanding him to harm himself and they are overwhelming at times. He says he is working on some coping mechanisms taught in psychotherapy and is trying to channel his anxiety in better routes. He has been practicing maintaining his own ostomy bag but still feels insecure about having it. He has been having a hard time concentrating, sleeping, and has had a low appetite (did not eat breakfast) since his colostomy surgery in December. Patient says he is able to calm down a little bit when working on coloring sheets or word searches.  Overall, patient advises ostomy bag has the main trigger for his depression at this time.  Also mention that losing his son has made it worse.                Medical ROS (as pertinent):     Review of Systems   Constitutional: Positive for malaise/fatigue.   Respiratory: Negative for cough and shortness of breath.    Cardiovascular: Negative for chest pain.   Gastrointestinal: Negative for abdominal pain, nausea and vomiting.   Neurological: Positive for headaches.   Psychiatric/Behavioral: Positive for depression, hallucinations and suicidal ideas. The patient is nervous/anxious and has insomnia.            *Psychiatric Examination:  Vitals:   Vitals:    03/03/21 0813   BP: 107/78   Pulse: 100   Resp: 18   Temp: 36.7 °C (98 °F)   SpO2:        General Appearance: Lying in bed watching TV, calm and cooperative with evaluation, fair eye contact  Abnormal Movements: Normal  Gait and Posture: Normal lying  bed  Speech: Soft  Thought processes: Linear and goal oriented  Associations: No loose associations  Abnormal or Psychotic Thoughts: Auditory hallucinations  Judgement and Insight: Good  Orientation: Grossly oriented  Recent and Remote Memory: Intact  Attention Span and Concentration: Intact  Language: Fluid  Fund of Knowledge: Not tested  Mood and Affect: Depressed/depressed  SI/HI:+SI        Assessment: Patient continues to endorse depression, which is not improving, with suicidal ideations and auditory hallucinations in the context of having a colostomy bag and losing his son in December.  Patient was seen by Dr. Posey, who discontinued modafinil and Risperdal due to possible akathisia.  Patient was started on Klonopin for anxiety and switched to Geodon to target mood and psychosis.  Patient continues to endorse active suicidal ideations with intention and plan, therefore he continues to have an elevated risk for danger to himself.  Will continue the legal hold.       Dx:  Major depressive disorder, recurrent, with mood congruent psychosis    Medical (specify new and established dx):  Orthostatic hypotension  Syncope  Paroxysmal atrial fibrillation  Chronic heart failure  Diabetes type 2  Hypothyroidism  Essential hypertension  Debility  Chronic venous insufficiency of lower extremity  Colostomy present  Nonischemic cardiomyopathy  Mixed hyperlipidemia  Obesity      Plan:  1- Legal hold: Extended  2- Psychotropic medications: Increase clonazepam to 0.5/1 mg to target anxiety  Continue Paxil 10 mg p.o. daily to target mood  Continue Geodon 60 mg p.o. twice daily to target psychosis  3- Please transfer pt to inpatient psychiatric hospital when bed is available  4-continue inpatient psychotherapy with Dr. Fernandez  5- Psychiatry will follow up.    Thank you for the consult.       Sitter: Yes  Phone: Yes  Visitors: Yes  Personal belongings: Yes

## 2021-03-03 NOTE — DISCHARGE PLANNING
Agency/Facility Name: [facility]  Outcome: Left vmail for nursing supervisor inquiring where pt is on their list. Requested call back.

## 2021-03-03 NOTE — CARE PLAN
Problem: Safety  Goal: Will remain free from falls  Outcome: PROGRESSING AS EXPECTED     Problem: Infection  Goal: Will remain free from infection  Outcome: PROGRESSING AS EXPECTED  Intervention: Assess signs and symptoms of infection  Note: Vital signs q 8 hours. Pt is afebrile.

## 2021-03-03 NOTE — DISCHARGE PLANNING
Agency/Facility Name: [facility]  Spoke To: Lena   Outcome: pt is #3 on the waitlist.

## 2021-03-04 ENCOUNTER — APPOINTMENT (OUTPATIENT)
Dept: RADIOLOGY | Facility: MEDICAL CENTER | Age: 56
DRG: 981 | End: 2021-03-04
Attending: INTERNAL MEDICINE
Payer: MEDICAID

## 2021-03-04 LAB
ANION GAP SERPL CALC-SCNC: 14 MMOL/L (ref 7–16)
BUN SERPL-MCNC: 26 MG/DL (ref 8–22)
CALCIUM SERPL-MCNC: 9.8 MG/DL (ref 8.5–10.5)
CHLORIDE SERPL-SCNC: 98 MMOL/L (ref 96–112)
CO2 SERPL-SCNC: 21 MMOL/L (ref 20–33)
CREAT SERPL-MCNC: 0.98 MG/DL (ref 0.5–1.4)
EKG IMPRESSION: NORMAL
GLUCOSE BLD-MCNC: 146 MG/DL (ref 65–99)
GLUCOSE BLD-MCNC: 147 MG/DL (ref 65–99)
GLUCOSE BLD-MCNC: 167 MG/DL (ref 65–99)
GLUCOSE SERPL-MCNC: 147 MG/DL (ref 65–99)
MAGNESIUM SERPL-MCNC: 1.6 MG/DL (ref 1.5–2.5)
PHOSPHATE SERPL-MCNC: 3.9 MG/DL (ref 2.5–4.5)
POTASSIUM SERPL-SCNC: 3.9 MMOL/L (ref 3.6–5.5)
SODIUM SERPL-SCNC: 133 MMOL/L (ref 135–145)

## 2021-03-04 PROCEDURE — A9270 NON-COVERED ITEM OR SERVICE: HCPCS | Performed by: INTERNAL MEDICINE

## 2021-03-04 PROCEDURE — 700102 HCHG RX REV CODE 250 W/ 637 OVERRIDE(OP): Performed by: HOSPITALIST

## 2021-03-04 PROCEDURE — 93010 ELECTROCARDIOGRAM REPORT: CPT | Performed by: INTERNAL MEDICINE

## 2021-03-04 PROCEDURE — 700102 HCHG RX REV CODE 250 W/ 637 OVERRIDE(OP): Performed by: STUDENT IN AN ORGANIZED HEALTH CARE EDUCATION/TRAINING PROGRAM

## 2021-03-04 PROCEDURE — 74177 CT ABD & PELVIS W/CONTRAST: CPT

## 2021-03-04 PROCEDURE — 83735 ASSAY OF MAGNESIUM: CPT

## 2021-03-04 PROCEDURE — 700117 HCHG RX CONTRAST REV CODE 255: Performed by: INTERNAL MEDICINE

## 2021-03-04 PROCEDURE — 700102 HCHG RX REV CODE 250 W/ 637 OVERRIDE(OP): Performed by: INTERNAL MEDICINE

## 2021-03-04 PROCEDURE — 302098 PASTE RING (FLAT): Performed by: INTERNAL MEDICINE

## 2021-03-04 PROCEDURE — 36415 COLL VENOUS BLD VENIPUNCTURE: CPT

## 2021-03-04 PROCEDURE — 700105 HCHG RX REV CODE 258: Performed by: INTERNAL MEDICINE

## 2021-03-04 PROCEDURE — 700102 HCHG RX REV CODE 250 W/ 637 OVERRIDE(OP): Performed by: GENERAL PRACTICE

## 2021-03-04 PROCEDURE — A9270 NON-COVERED ITEM OR SERVICE: HCPCS | Performed by: STUDENT IN AN ORGANIZED HEALTH CARE EDUCATION/TRAINING PROGRAM

## 2021-03-04 PROCEDURE — 93005 ELECTROCARDIOGRAM TRACING: CPT | Performed by: INTERNAL MEDICINE

## 2021-03-04 PROCEDURE — A9270 NON-COVERED ITEM OR SERVICE: HCPCS | Performed by: GENERAL PRACTICE

## 2021-03-04 PROCEDURE — 51798 US URINE CAPACITY MEASURE: CPT

## 2021-03-04 PROCEDURE — 80048 BASIC METABOLIC PNL TOTAL CA: CPT

## 2021-03-04 PROCEDURE — 700102 HCHG RX REV CODE 250 W/ 637 OVERRIDE(OP): Performed by: PSYCHIATRY & NEUROLOGY

## 2021-03-04 PROCEDURE — 700111 HCHG RX REV CODE 636 W/ 250 OVERRIDE (IP): Performed by: HOSPITALIST

## 2021-03-04 PROCEDURE — 82962 GLUCOSE BLOOD TEST: CPT | Mod: 91

## 2021-03-04 PROCEDURE — 99233 SBSQ HOSP IP/OBS HIGH 50: CPT | Performed by: INTERNAL MEDICINE

## 2021-03-04 PROCEDURE — A9270 NON-COVERED ITEM OR SERVICE: HCPCS | Performed by: PSYCHIATRY & NEUROLOGY

## 2021-03-04 PROCEDURE — 84100 ASSAY OF PHOSPHORUS: CPT

## 2021-03-04 PROCEDURE — 770004 HCHG ROOM/CARE - ONCOLOGY PRIVATE *

## 2021-03-04 PROCEDURE — A9270 NON-COVERED ITEM OR SERVICE: HCPCS | Performed by: HOSPITALIST

## 2021-03-04 RX ORDER — METOPROLOL SUCCINATE 25 MG/1
25 TABLET, EXTENDED RELEASE ORAL
Status: DISCONTINUED | OUTPATIENT
Start: 2021-03-04 | End: 2021-04-22

## 2021-03-04 RX ORDER — SODIUM CHLORIDE 9 MG/ML
INJECTION, SOLUTION INTRAVENOUS CONTINUOUS
Status: DISCONTINUED | OUTPATIENT
Start: 2021-03-04 | End: 2021-03-09

## 2021-03-04 RX ADMIN — Medication 400 MG: at 07:49

## 2021-03-04 RX ADMIN — DIGOXIN 125 MCG: 125 TABLET ORAL at 17:22

## 2021-03-04 RX ADMIN — IOHEXOL 100 ML: 350 INJECTION, SOLUTION INTRAVENOUS at 14:10

## 2021-03-04 RX ADMIN — DOCUSATE SODIUM 50 MG AND SENNOSIDES 8.6 MG 2 TABLET: 8.6; 5 TABLET, FILM COATED ORAL at 07:49

## 2021-03-04 RX ADMIN — INSULIN HUMAN 2 UNITS: 100 INJECTION, SOLUTION PARENTERAL at 07:43

## 2021-03-04 RX ADMIN — CLONAZEPAM 0.5 MG: 0.5 TABLET ORAL at 07:48

## 2021-03-04 RX ADMIN — METOPROLOL SUCCINATE 25 MG: 25 TABLET, EXTENDED RELEASE ORAL at 11:26

## 2021-03-04 RX ADMIN — LEVOTHYROXINE SODIUM 112 MCG: 0.11 TABLET ORAL at 07:48

## 2021-03-04 RX ADMIN — ONDANSETRON 4 MG: 2 INJECTION INTRAMUSCULAR; INTRAVENOUS at 11:01

## 2021-03-04 RX ADMIN — ACETAMINOPHEN 650 MG: 325 TABLET, FILM COATED ORAL at 21:07

## 2021-03-04 RX ADMIN — SODIUM CHLORIDE: 9 INJECTION, SOLUTION INTRAVENOUS at 11:26

## 2021-03-04 RX ADMIN — IOHEXOL 25 ML: 240 INJECTION, SOLUTION INTRATHECAL; INTRAVASCULAR; INTRAVENOUS; ORAL at 14:11

## 2021-03-04 RX ADMIN — MIDODRINE HYDROCHLORIDE 5 MG: 5 TABLET ORAL at 07:59

## 2021-03-04 RX ADMIN — CLONAZEPAM 1 MG: 1 TABLET ORAL at 17:21

## 2021-03-04 RX ADMIN — PAROXETINE HYDROCHLORIDE 10 MG: 20 TABLET, FILM COATED ORAL at 07:48

## 2021-03-04 RX ADMIN — ZIPRASIDONE HYDROCHLORIDE 60 MG: 60 CAPSULE ORAL at 07:52

## 2021-03-04 RX ADMIN — ZIPRASIDONE HYDROCHLORIDE 60 MG: 60 CAPSULE ORAL at 21:07

## 2021-03-04 RX ADMIN — PROMETHAZINE HYDROCHLORIDE 25 MG: 25 TABLET ORAL at 06:36

## 2021-03-04 RX ADMIN — ATORVASTATIN CALCIUM 40 MG: 40 TABLET, FILM COATED ORAL at 17:22

## 2021-03-04 RX ADMIN — APIXABAN 5 MG: 5 TABLET, FILM COATED ORAL at 07:48

## 2021-03-04 RX ADMIN — ONDANSETRON 4 MG: 4 TABLET, ORALLY DISINTEGRATING ORAL at 04:25

## 2021-03-04 RX ADMIN — ZOLPIDEM TARTRATE 2.5 MG: 5 TABLET ORAL at 21:07

## 2021-03-04 RX ADMIN — APIXABAN 5 MG: 5 TABLET, FILM COATED ORAL at 21:07

## 2021-03-04 RX ADMIN — INSULIN GLARGINE 10 UNITS: 100 INJECTION, SOLUTION SUBCUTANEOUS at 17:22

## 2021-03-04 RX ADMIN — PROCHLORPERAZINE EDISYLATE 10 MG: 5 INJECTION INTRAMUSCULAR; INTRAVENOUS at 08:00

## 2021-03-04 RX ADMIN — MIDODRINE HYDROCHLORIDE 5 MG: 5 TABLET ORAL at 21:07

## 2021-03-04 ASSESSMENT — ENCOUNTER SYMPTOMS
WEAKNESS: 0
DEPRESSION: 1
SORE THROAT: 0
COUGH: 0
VOMITING: 0
CHILLS: 0
CONSTIPATION: 0
FALLS: 0
SHORTNESS OF BREATH: 0
ABDOMINAL PAIN: 0
HEADACHES: 0
FEVER: 0
DIZZINESS: 0
BLURRED VISION: 0
PALPITATIONS: 1
DIARRHEA: 0
NAUSEA: 1

## 2021-03-04 ASSESSMENT — PAIN DESCRIPTION - PAIN TYPE
TYPE: ACUTE PAIN
TYPE: ACUTE PAIN

## 2021-03-04 NOTE — DISCHARGE PLANNING
Received Stipulation and Order from the court continuing pt's legal hold until 3/11. Sent copy to JAMES Tracy, scanned copy into media manager.

## 2021-03-04 NOTE — PROGRESS NOTES
[facility] Medicine Daily Progress Note    Date of Service  3/4/2021    Chief Complaint  56 y.o. male admitted 2/9/2021 with   Chief Complaint   Patient presents with   • Syncope     X1 this morning when standing up to go to bathroom         Hospital Course  Mr. Sebastián Castro is a 56 y.o. male history of atrial fibrillation secondary to hypercoagulable state, diabetes complicated by diabetic neuropathy, congestive heart failure, hyperlipidemia, orthostatic hypotension on fludrocortisone who presented on 2/9/2021 with syncopal episode.  Patient reported dizziness prior to syncope.  On presentation to the emergency room she was hypertensive and bradycardic.  Cardiology recommended stopping all AV sanket blocking agents.  He was also started on midodrine for hypotension.    The patient was started on digoxin and apixaban for atrial fibrillation.  Echocardiogram showed EF of 60%, improved from previous echocardiogram obtained on 11/2020.    Of note patient has history of a spleen injury complicated by perforation of splenic flexure in September 2020.  Status post colostomy and Interiano's pouch in November 2020.  He was at home taking care of his colostomy prior to most recent admission.  However he has become depressed, overwhelmed, and can no longer take care of his colostomy independently.    Psych continues to follow the patient; psychiatric medication as per psych recommendation.      Interval Problem Update  Patient was seen and examined at bedside.  I have personally reviewed vitals, labs, and imaging.    3/2.  Afebrile.  Stable vitals.  On room air.  Denies fever, chills, chest pains, shortness of breath, dizziness, lightheadedness, palpitations.  Still reports depression and suicidal ideations.  He is now coming to  with having an ostomy.  3/3.  Afebrile.  Stable vitals.  On room air.  Replete mag, potassium.  Denies fever, chills, chest pains, shortness of breath.  Still reports some depression and low energy.   Reports some suicidal ideations.  Does not feel that he can take care of his ostomy by himself but cannot give a reason as to why not.  Did speak with general surgery Dr. Storm Gan who felt that reversible of his ostomy is not likely at this time.  3/4.  Afebrile.  Tachycardic this morning.  On room air.  Replete magnesium.  Hyponatremia is improved.  Patient has had nausea overnight and has not been able to keep down food and many medications.  He also had a bowel movement for the first time since his ostomy.  EKG this morning does show atrial fibrillation with a rate of 111.  He is on digoxin and apixaban.  Was previously on metoprolol and diltiazem for rate control but rate has been controlled without medications recently as well as he was having orthostatic hypotension and bradycardia.  Restart metoprolol ordered for CT abdomen pelvis shows fluid distention in the colon.  QT is not prolonged.  We will continue to monitor nausea.  Started on IV fluids.    Consultants/Specialty  Psychiatry, cardiology    Code Status  Full Code    Disposition  To inpatient psychiatry    Review of Systems  Review of Systems   Constitutional: Negative for chills and fever.   HENT: Negative for congestion and sore throat.    Eyes: Negative for blurred vision.   Respiratory: Negative for cough and shortness of breath.    Cardiovascular: Positive for palpitations and leg swelling. Negative for chest pain.   Gastrointestinal: Positive for nausea. Negative for abdominal pain, constipation, diarrhea and vomiting.   Genitourinary: Negative for dysuria, frequency and urgency.   Musculoskeletal: Negative for falls.   Skin: Negative for rash.   Neurological: Negative for dizziness, weakness and headaches.   Psychiatric/Behavioral: Positive for depression and suicidal ideas.   All other systems reviewed and are negative.       Physical Exam  Temp:  [36.4 °C (97.5 °F)-37.2 °C (98.9 °F)] 36.4 °C (97.5 °F)  Pulse:  [] 77  Resp:  [16-18]  16  BP: (107-152)/() 128/85  SpO2:  [97 %] 97 %    Physical Exam  Vitals and nursing note reviewed.   Constitutional:       General: He is not in acute distress.     Appearance: Normal appearance. He is obese.   HENT:      Head: Normocephalic and atraumatic.      Right Ear: External ear normal.      Left Ear: External ear normal.      Nose: Nose normal.      Mouth/Throat:      Mouth: Mucous membranes are moist.      Pharynx: Oropharynx is clear.   Eyes:      Extraocular Movements: Extraocular movements intact.      Conjunctiva/sclera: Conjunctivae normal.   Cardiovascular:      Rate and Rhythm: Normal rate and regular rhythm.      Pulses: Normal pulses.      Heart sounds: Normal heart sounds.   Pulmonary:      Effort: Pulmonary effort is normal. No respiratory distress.      Breath sounds: Normal breath sounds. No wheezing.   Abdominal:      General: Abdomen is flat. Bowel sounds are normal. There is no distension.      Palpations: Abdomen is soft.      Tenderness: There is no abdominal tenderness.      Comments: Does have ostomy in place  Also noted to have wound  With granulation tissue    Musculoskeletal:         General: Normal range of motion.      Cervical back: Normal range of motion.      Right lower leg: Edema present.      Left lower leg: Edema present.      Comments: Varicose veins and swelling of lower extremities   Skin:     General: Skin is warm.   Neurological:      General: No focal deficit present.      Mental Status: He is alert and oriented to person, place, and time. Mental status is at baseline.      Cranial Nerves: No cranial nerve deficit.      Motor: No weakness.   Psychiatric:         Behavior: Behavior normal.      Comments: Depressed mood         Fluids    Intake/Output Summary (Last 24 hours) at 3/4/2021 0719  Last data filed at 3/3/2021 1748  Gross per 24 hour   Intake 250 ml   Output 1200 ml   Net -950 ml       Laboratory  Recent Labs     03/02/21  0022 03/03/21  0017   WBC 10.2  10.9*   RBC 5.21 5.31   HEMOGLOBIN 14.9 15.6   HEMATOCRIT 45.8 46.5   MCV 87.9 87.6   MCH 28.6 29.4   MCHC 32.5* 33.5*   RDW 44.7 45.0   PLATELETCT 243 231   MPV 11.2 10.5     Recent Labs     03/02/21  0022 03/03/21  0017 03/04/21  0009   SODIUM 138 128* 133*   POTASSIUM 4.1 3.5* 3.9   CHLORIDE 103 98 98   CO2 22 23 21   GLUCOSE 110* 133* 147*   BUN 28* 32* 26*   CREATININE 1.37 1.29 0.98   CALCIUM 9.8 9.7 9.8                   Imaging  EC-ECHOCARDIOGRAM COMPLETE W/O CONT   Final Result      DX-LUMBAR SPINE-2 OR 3 VIEWS   Final Result      Moderate compression deformity of T11 is new compared to 2018.      Mild wedge deformity of T12 is unchanged.      Degenerative changes including facet arthropathy.      Mild retrolisthesis of L5 on S1 and L3 on L4.              Assessment/Plan  * Suicidal ideation  Assessment & Plan  Has history of MDD, recurrent with psychotic features with active SI w/ plan.   --Psych following, continue cycling medication as per psych recommendation.    Patient is medically cleared to be transferred to inpatient psych    Major depressive disorder, recurrent, severe with psychotic features (HCC)  Assessment & Plan  -Psychiatry and Psychology following   -Psych medication as per psych recommendation    Colostomy present (HCC)- (present on admission)  Assessment & Plan  History of fall at home Nov 2020 on left side with perforated colon and splenic fluid collection/hematoma s/p segmental colectomy, colostomy, mobilization of the splenic flexure, wound vac, and I&D Nov 2020.   -Continue to provide education for colostomy care.  Patient must be independently able to care for his ostomy before inpatient psychiatry will accept him  I did speak with [facility].  Reversal of ostomy is unlikely if it has not been 6 months.  Also needs further work-up such as a colonoscopy prior to reversal.    Orthostatic hypotension- (present on admission)  Assessment & Plan  Multifactorial including  medications (beta blocker, CCB, tamsulosin), diabetic autonomic dysfunction, and venous insufficiency   --EF is noted to be 60%, significant improvement from prior.   --Per cardiology, no further cardiac work-up needed  -Patient need to follow-up with cardiology as an outpatient.    Syncope  Assessment & Plan  Likely secondary to orthostatic hypotension  Echo normal with 60% ejection fraction, improved from prior    Paroxysmal atrial fibrillation (HCC)  Assessment & Plan  CHADSVASC 3 (HTN, CHF, DM). Rates 60s-70s w/out beta blockade. TTE Feb 2021: EF 60%,   -Continue digoxin 0.125 mg p.o. daily, Eliquis 5 mg p.o. twice daily  Restart metoprolol XL for rate control    Chronic HFrEF (heart failure with reduced ejection fraction) ([facility])  Assessment & Plan  EF 60% on repeat echo.  Patient is currently euvolemic.    I/Os, daily weight and fluid restriction to 1.5 L  Continue metoprolol XL    Diabetes mellitus type 2, insulin dependent ([facility])  Assessment & Plan  Lab Results   Component Value Date/Time    HBA1C 5.2 02/14/2021 0130    HBA1C 7.8 (H) 11/10/2020 0331    HBA1C 7.7 (H) 10/22/2019 0823     Results from last 7 days   Lab Units 03/04/21  0736 03/03/21  2122 03/03/21  1733 03/03/21  1250 03/03/21  0830 03/02/21  1827 03/02/21  0802 03/01/21  1747   ACCU CHECK GLUCOSE 788 mg/dL 167* 129* 134* 114* 135* 126* 120* 110*     I have ordered insulin sliding scale with D50 and glucagon for hypoglycemia per protocol.  Diabetic diet  Diabetic education    Hypothyroidism- (present on admission)  Assessment & Plan  TSH wnl Feb 2021  -continue home Levothyroxine    Essential hypertension- (present on admission)  Assessment & Plan  Blood pressure well controlled, monitor.    Has been hypotensive  Decrease midodrine  5 mg p.o. twice daily.      Lower limb amputation, great toe (HCC)- (present on admission)  Assessment & Plan  s/p right great toe amputation 2018    Elevated troponin  Assessment & Plan  RESOLVED  Stable. Suspect due  to demand ischemia. No need to trend further.    Debility- (present on admission)  Assessment & Plan  -PT/OT evaluated and appreciated recs    Chronic venous insufficiency of lower extremity  Assessment & Plan  -elevated legs  -compression stockings  -lotion, soap/water to legs  -weight loss (BMI 38)    Nonischemic cardiomyopathy (HCC)- (present on admission)  Assessment & Plan  Normal coronaries, 2018.Tachycardia mediated  -LVEF improved, based on echo 2/10/2021 LVEF 60%    Mixed hyperlipidemia- (present on admission)  Assessment & Plan  LDL 82 Feb 2021  -repeat lipid panel  -continue Atorvatatin    Obesity- (present on admission)  Assessment & Plan  Body mass index is 37.79 kg/m².  Counseled about diet and exercise       VTE prophylaxis:  eliquis

## 2021-03-04 NOTE — DISCHARGE PLANNING
Agency/Facility Name: [facility]  Outcome: Left vmail for admissions regarding referral. Requested call back.

## 2021-03-04 NOTE — DISCHARGE PLANNING
"Anticipated Discharge Disposition: Inpatient psych facility.     Action: LSW completed chart review. As of yesterday, pt #3 on list for [facility]. LSW inquired about possibility of ostomy reversal, however, per MD note: \"I did speak with [facility].  Reversal of ostomy is unlikely if it has not been 6 months.  Also needs further work-up such as a colonoscopy prior to reversal.\" LSW to update [facility] leadership who is also following this pt.     Barriers to Discharge: Ostomy, independent ostomy care, SI.     Plan: LSW will continue to assist in d/c planning.     "

## 2021-03-04 NOTE — WOUND TEAM
Per RN, pt not appropriate for ostomy education today d/t abdominal pain pending CT. Wound team to follow up tomorrow. Ostomy appliance intact.

## 2021-03-05 ENCOUNTER — APPOINTMENT (OUTPATIENT)
Dept: RADIOLOGY | Facility: MEDICAL CENTER | Age: 56
DRG: 981 | End: 2021-03-05
Attending: INTERNAL MEDICINE
Payer: MEDICAID

## 2021-03-05 LAB
ANION GAP SERPL CALC-SCNC: 10 MMOL/L (ref 7–16)
BASOPHILS # BLD AUTO: 0.6 % (ref 0–1.8)
BASOPHILS # BLD: 0.07 K/UL (ref 0–0.12)
BUN SERPL-MCNC: 35 MG/DL (ref 8–22)
CALCIUM SERPL-MCNC: 9.2 MG/DL (ref 8.5–10.5)
CHLORIDE SERPL-SCNC: 98 MMOL/L (ref 96–112)
CO2 SERPL-SCNC: 22 MMOL/L (ref 20–33)
CREAT SERPL-MCNC: 1.21 MG/DL (ref 0.5–1.4)
CREAT UR-MCNC: 118.32 MG/DL
EOSINOPHIL # BLD AUTO: 0.17 K/UL (ref 0–0.51)
EOSINOPHIL NFR BLD: 1.5 % (ref 0–6.9)
ERYTHROCYTE [DISTWIDTH] IN BLOOD BY AUTOMATED COUNT: 44.9 FL (ref 35.9–50)
GLUCOSE BLD-MCNC: 102 MG/DL (ref 65–99)
GLUCOSE BLD-MCNC: 113 MG/DL (ref 65–99)
GLUCOSE BLD-MCNC: 117 MG/DL (ref 65–99)
GLUCOSE SERPL-MCNC: 130 MG/DL (ref 65–99)
HCT VFR BLD AUTO: 49.1 % (ref 42–52)
HGB BLD-MCNC: 16.2 G/DL (ref 14–18)
IMM GRANULOCYTES # BLD AUTO: 0.04 K/UL (ref 0–0.11)
IMM GRANULOCYTES NFR BLD AUTO: 0.3 % (ref 0–0.9)
LACTATE BLD-SCNC: 1.2 MMOL/L (ref 0.5–2)
LYMPHOCYTES # BLD AUTO: 2.18 K/UL (ref 1–4.8)
LYMPHOCYTES NFR BLD: 18.8 % (ref 22–41)
MAGNESIUM SERPL-MCNC: 1.7 MG/DL (ref 1.5–2.5)
MCH RBC QN AUTO: 28.7 PG (ref 27–33)
MCHC RBC AUTO-ENTMCNC: 33 G/DL (ref 33.7–35.3)
MCV RBC AUTO: 86.9 FL (ref 81.4–97.8)
MONOCYTES # BLD AUTO: 1.75 K/UL (ref 0–0.85)
MONOCYTES NFR BLD AUTO: 15.1 % (ref 0–13.4)
NEUTROPHILS # BLD AUTO: 7.36 K/UL (ref 1.82–7.42)
NEUTROPHILS NFR BLD: 63.7 % (ref 44–72)
NRBC # BLD AUTO: 0 K/UL
NRBC BLD-RTO: 0 /100 WBC
PHOSPHATE SERPL-MCNC: 4 MG/DL (ref 2.5–4.5)
PLATELET # BLD AUTO: 212 K/UL (ref 164–446)
PMV BLD AUTO: 11.5 FL (ref 9–12.9)
POTASSIUM SERPL-SCNC: 3.9 MMOL/L (ref 3.6–5.5)
RBC # BLD AUTO: 5.65 M/UL (ref 4.7–6.1)
SODIUM SERPL-SCNC: 130 MMOL/L (ref 135–145)
WBC # BLD AUTO: 11.6 K/UL (ref 4.8–10.8)

## 2021-03-05 PROCEDURE — 82962 GLUCOSE BLOOD TEST: CPT | Mod: 91

## 2021-03-05 PROCEDURE — 770004 HCHG ROOM/CARE - ONCOLOGY PRIVATE *

## 2021-03-05 PROCEDURE — 700102 HCHG RX REV CODE 250 W/ 637 OVERRIDE(OP): Performed by: STUDENT IN AN ORGANIZED HEALTH CARE EDUCATION/TRAINING PROGRAM

## 2021-03-05 PROCEDURE — 83605 ASSAY OF LACTIC ACID: CPT

## 2021-03-05 PROCEDURE — 85025 COMPLETE CBC W/AUTO DIFF WBC: CPT

## 2021-03-05 PROCEDURE — 700102 HCHG RX REV CODE 250 W/ 637 OVERRIDE(OP): Performed by: PSYCHIATRY & NEUROLOGY

## 2021-03-05 PROCEDURE — 700102 HCHG RX REV CODE 250 W/ 637 OVERRIDE(OP): Performed by: INTERNAL MEDICINE

## 2021-03-05 PROCEDURE — 82570 ASSAY OF URINE CREATININE: CPT

## 2021-03-05 PROCEDURE — 99233 SBSQ HOSP IP/OBS HIGH 50: CPT | Performed by: INTERNAL MEDICINE

## 2021-03-05 PROCEDURE — 700105 HCHG RX REV CODE 258: Performed by: INTERNAL MEDICINE

## 2021-03-05 PROCEDURE — A9270 NON-COVERED ITEM OR SERVICE: HCPCS | Performed by: INTERNAL MEDICINE

## 2021-03-05 PROCEDURE — A9270 NON-COVERED ITEM OR SERVICE: HCPCS | Performed by: STUDENT IN AN ORGANIZED HEALTH CARE EDUCATION/TRAINING PROGRAM

## 2021-03-05 PROCEDURE — 700102 HCHG RX REV CODE 250 W/ 637 OVERRIDE(OP): Performed by: HOSPITALIST

## 2021-03-05 PROCEDURE — A9270 NON-COVERED ITEM OR SERVICE: HCPCS | Performed by: PSYCHIATRY & NEUROLOGY

## 2021-03-05 PROCEDURE — 80048 BASIC METABOLIC PNL TOTAL CA: CPT

## 2021-03-05 PROCEDURE — 90832 PSYTX W PT 30 MINUTES: CPT | Performed by: PSYCHOLOGIST

## 2021-03-05 PROCEDURE — 83735 ASSAY OF MAGNESIUM: CPT

## 2021-03-05 PROCEDURE — 700102 HCHG RX REV CODE 250 W/ 637 OVERRIDE(OP): Performed by: GENERAL PRACTICE

## 2021-03-05 PROCEDURE — 84100 ASSAY OF PHOSPHORUS: CPT

## 2021-03-05 PROCEDURE — 36415 COLL VENOUS BLD VENIPUNCTURE: CPT

## 2021-03-05 PROCEDURE — A9270 NON-COVERED ITEM OR SERVICE: HCPCS | Performed by: HOSPITALIST

## 2021-03-05 PROCEDURE — A9270 NON-COVERED ITEM OR SERVICE: HCPCS | Performed by: GENERAL PRACTICE

## 2021-03-05 PROCEDURE — 700105 HCHG RX REV CODE 258: Performed by: STUDENT IN AN ORGANIZED HEALTH CARE EDUCATION/TRAINING PROGRAM

## 2021-03-05 PROCEDURE — 76775 US EXAM ABDO BACK WALL LIM: CPT

## 2021-03-05 PROCEDURE — 84300 ASSAY OF URINE SODIUM: CPT

## 2021-03-05 RX ORDER — MIDODRINE HYDROCHLORIDE 5 MG/1
5 TABLET ORAL ONCE
Status: DISCONTINUED | OUTPATIENT
Start: 2021-03-05 | End: 2021-03-05

## 2021-03-05 RX ORDER — SODIUM CHLORIDE 9 MG/ML
1000 INJECTION, SOLUTION INTRAVENOUS ONCE
Status: COMPLETED | OUTPATIENT
Start: 2021-03-05 | End: 2021-03-05

## 2021-03-05 RX ORDER — MIDODRINE HYDROCHLORIDE 5 MG/1
5 TABLET ORAL ONCE
Status: COMPLETED | OUTPATIENT
Start: 2021-03-05 | End: 2021-03-05

## 2021-03-05 RX ORDER — SODIUM CHLORIDE 9 MG/ML
500 INJECTION, SOLUTION INTRAVENOUS ONCE
Status: COMPLETED | OUTPATIENT
Start: 2021-03-05 | End: 2021-03-05

## 2021-03-05 RX ORDER — MIDODRINE HYDROCHLORIDE 5 MG/1
5 TABLET ORAL
Status: DISCONTINUED | OUTPATIENT
Start: 2021-03-05 | End: 2021-03-13

## 2021-03-05 RX ADMIN — CLONAZEPAM 1 MG: 1 TABLET ORAL at 16:51

## 2021-03-05 RX ADMIN — ACETAMINOPHEN 650 MG: 325 TABLET, FILM COATED ORAL at 21:35

## 2021-03-05 RX ADMIN — MIDODRINE HYDROCHLORIDE 5 MG: 5 TABLET ORAL at 01:31

## 2021-03-05 RX ADMIN — LEVOTHYROXINE SODIUM 112 MCG: 0.11 TABLET ORAL at 05:18

## 2021-03-05 RX ADMIN — SODIUM CHLORIDE 500 ML: 9 INJECTION, SOLUTION INTRAVENOUS at 07:59

## 2021-03-05 RX ADMIN — ACETAMINOPHEN 650 MG: 325 TABLET, FILM COATED ORAL at 12:52

## 2021-03-05 RX ADMIN — MIDODRINE HYDROCHLORIDE 5 MG: 5 TABLET ORAL at 12:02

## 2021-03-05 RX ADMIN — DIGOXIN 125 MCG: 125 TABLET ORAL at 16:50

## 2021-03-05 RX ADMIN — DOCUSATE SODIUM 50 MG AND SENNOSIDES 8.6 MG 2 TABLET: 8.6; 5 TABLET, FILM COATED ORAL at 21:35

## 2021-03-05 RX ADMIN — APIXABAN 5 MG: 5 TABLET, FILM COATED ORAL at 21:35

## 2021-03-05 RX ADMIN — ZIPRASIDONE HYDROCHLORIDE 60 MG: 60 CAPSULE ORAL at 07:59

## 2021-03-05 RX ADMIN — SODIUM CHLORIDE 1000 ML: 9 INJECTION, SOLUTION INTRAVENOUS at 00:09

## 2021-03-05 RX ADMIN — APIXABAN 5 MG: 5 TABLET, FILM COATED ORAL at 08:00

## 2021-03-05 RX ADMIN — SODIUM CHLORIDE 500 ML: 9 INJECTION, SOLUTION INTRAVENOUS at 01:35

## 2021-03-05 RX ADMIN — CLONAZEPAM 0.5 MG: 0.5 TABLET ORAL at 07:59

## 2021-03-05 RX ADMIN — INSULIN GLARGINE 10 UNITS: 100 INJECTION, SOLUTION SUBCUTANEOUS at 16:59

## 2021-03-05 RX ADMIN — MIDODRINE HYDROCHLORIDE 5 MG: 5 TABLET ORAL at 08:00

## 2021-03-05 RX ADMIN — HYDROXYZINE HYDROCHLORIDE 25 MG: 25 TABLET, FILM COATED ORAL at 21:35

## 2021-03-05 RX ADMIN — ZOLPIDEM TARTRATE 2.5 MG: 5 TABLET ORAL at 21:50

## 2021-03-05 RX ADMIN — MIDODRINE HYDROCHLORIDE 5 MG: 5 TABLET ORAL at 16:50

## 2021-03-05 RX ADMIN — ZIPRASIDONE HYDROCHLORIDE 60 MG: 60 CAPSULE ORAL at 21:35

## 2021-03-05 RX ADMIN — PAROXETINE HYDROCHLORIDE 10 MG: 20 TABLET, FILM COATED ORAL at 08:00

## 2021-03-05 RX ADMIN — ATORVASTATIN CALCIUM 40 MG: 40 TABLET, FILM COATED ORAL at 16:50

## 2021-03-05 ASSESSMENT — ENCOUNTER SYMPTOMS
DEPRESSION: 1
CONSTIPATION: 0
ABDOMINAL PAIN: 0
DIZZINESS: 0
VOMITING: 0
COUGH: 0
HEADACHES: 0
DIARRHEA: 0
SHORTNESS OF BREATH: 0
NAUSEA: 1
SORE THROAT: 0
FEVER: 0
BLURRED VISION: 0
PALPITATIONS: 1
WEAKNESS: 0
CHILLS: 0
FALLS: 0

## 2021-03-05 ASSESSMENT — PAIN DESCRIPTION - PAIN TYPE: TYPE: ACUTE PAIN

## 2021-03-05 NOTE — CARE PLAN
Problem: Safety  Goal: Will remain free from falls  Outcome: PROGRESSING AS EXPECTED     Problem: Infection  Goal: Will remain free from infection  Outcome: PROGRESSING AS EXPECTED     Problem: Bowel/Gastric:  Goal: Normal bowel function is maintained or improved  Outcome: PROGRESSING SLOWER THAN EXPECTED

## 2021-03-05 NOTE — PROGRESS NOTES
Bolus complete. BP 82/53. MD notified. MD ordered 500 bolus and 5mg of midodrine now. Will continue to monitor. Pt is lethargic, but responding to pain and opening eyes when asked.

## 2021-03-05 NOTE — WOUND TEAM
"[facility]   Inpatient Services   Established Ostomy Management/ troubleshooting  HPI: Reviewed  PMH: Reviewed   SH: Reviewed   Reason for Ostomy nurse consult:  Established colostomy    Subjective: \"I don't feel good  Objective: appliance intact, sitter at bedside, pt on legal hold    Stomal Appliance Assessment Intact   Stoma Assessment Intact;Beefy red   Stoma Shape Oval   Stoma Size (in) 1.25   Peristomal Assessment Intact;Pink   Mucocutaneous Junction Intact   Treatment Appliance Changed   Peristomal Protectant No Sting Skin Prep   Stomal Appliance 2 1/4\" (57mm) CTF;Paste Ring, 2\"   Output (mL) 70 mL   Output Color Brown   WOUND RN ONLY - Stomal Appliance  2 Piece;Paste Ring, 2\";2 1/4\" (57mm) CTF;Transparent Pouch Lock & Roll   Appliance (Pouch) # [id number]   Appliance Brand Daisy        Ostomy Appliance (type and size): 2 1/4\" 2 piece with Paste Ring, formaflex barrier     Interventions and Education: appliance removed. Stoma and peristomal skin cleansed with moist washcloth. No sting barrier applied to skin. Formaflex barrier stretched to size, paste ring applied and then adhered to skin. Pouch attached and end closed.     Evaluation:     3/5: Pt did not participate in education today. Pt answered questions appropriately, but remained with his eyes closed through the duration of ostomy care. Declined hands on education when offered. Reported he was not feeling good. Formaflex barrier brought in today for pt as these do no require cutting with scissors. Extra barrier left in bag. CT of abd obtained yesterday did not reveal obstruction.    3/1: Viable stoma, producing stool. Per CNA report at bedside, appliance was leaking earlier and CNA changed appliance. Patient observed all steps today and participated with hands on care and did well. Discussed with MD on possibility for reversal. Per surgery, reversal of ostomy is an outpatient procedure that he will have to follow-up with in the future " after discharge    2/26: Pt very down today, reports that psych has not seen him. Pt kept turning head away from ostomy today but did verbalized he will try to do hands on Sunday.     2/24/21: Pt unable to transfer to inpatient psych until he can care for colostomy independently. Ostomy RNs were asked to return for education. Pt extremely depressed today. Pt states he feels like an animal and that the colostomy is messy and smelly and he would rather die then care for it. This RN provided understanding and attempted to explain that colostomy is manageable. 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 Pt had colostomy created on November 10, 2020 by Dr. Desouza. May want to consider contacting surgery for possible reversal related to pts extreme body dysmorphia (this is pts 2nd admit for SI related to colostomy) and inability for pt to DC due to pts inability/unwillingness to care for colostomy - Discussed with Dr. Ley ([facility] Resident).           Plan: Wound team to continue with teaching every 3-4 days to help teach patient how to care for appliance so that he can discharge to psych.    Anticipated discharge needs: patient would benefit from SNF, outpatient clinic or Home health for follow up care. Patient needs to follow up with Dr. Desouza after discharge to plan for ostomy reversal surgery.

## 2021-03-05 NOTE — CONSULTS
"PSYCHOLOGICAL FOLLOW-UP:  Reason for admission: Syncope and collapse [R55]  Length of Visit: 30min    Legal status: Legal Status: Involuntary    Chief Complaint: \"I'm ok today.\"    HPI: Met with the patient for brief individual psychotherapy. Patient presented with a very tired affect and reported a \"ok\" mood. He continues to endorse suicidal thoughts with intent and mood congruent auditory hallucinations. Session focused on the patient's current physical stressors and how that impacts his ability to cope with psychological stress. The patient shared his difficult past couple of days with his heart and stomach leading to feeling more run down and tired. Discussed using mindfulness to \"take it one day at a time\" as well as the importance of eating to maintain energy. Agreed to meet again on Sunday.     Psychiatric Examination:  Vitals: Blood pressure 102/64, pulse 82, temperature 36.4 °C (97.5 °F), temperature source Temporal, resp. rate 16, height 1.829 m (6'), weight (!) 126 kg (278 lb 10.6 oz), SpO2 99 %.  Musculoskeletal: normal psychomotor activity no more psychomotor agitation noted, no tics or unusual mannerisms noted  Appearance and Eye Contact: appropriate dress and grooming. Behavior is calm, cooperative,  appropriate eye contact  Attention/Alertness: Alert  Thought Process: Linear, Logical and Goal Directed    Thought Content: mood congruent auditory  hallucinations  Speech: Clear with normal rate and rhythm  Mood: \"ok\"            Affect: tired         SI/HI: Endorses    Memory: Recent and remote memory appear intact    Orientation: alert, oriented to person, place and time  Insight into symptoms: fair  Judgement into symptoms:fair    ASSESSMENT: The patient continues to endorse suicidality with intent and is currently experiencing medically issues that are making it more difficult to cope with painful emotions. Therefore, he remains at high risk of attempting suicide. Legal hold remains extended.     DSM5 " Diagnostic Considerations:   Major Depressive Disorder, recurrent, severe, with psychotic features   Unspecified Anxiety Disorder    PLAN:  Legal status: Involuntary   1:1 sitter remains  No changes to legal hold precautions  Anticipate F/U within 48 hours.   Records reviewed: yes  Discussed patient with other provider: yes, team psychiatrist   Will continue to follow  Thank you for the consult.    Madhuri Fernandez, Ph.D.

## 2021-03-05 NOTE — PROGRESS NOTES
Pt BP 85/57. Dr Stevenson notified.  Ordered 1L bolus. Will check BP again post bolus. Pt has no other symptoms. Will continue to monitor.

## 2021-03-05 NOTE — PROGRESS NOTES
Assumed pt care at 0715.  Received report from hollie RN.  Assessment completed.  Pt AAOx4.  Pt has no comlaints of pain at this time.  No other s/s of discomfort or distress. Pt does not ambulate.  US scheduled for this AM.  Pt on legal hold with sitter at bedside.    Bed in lowest position and locked.  Pt calls appropriately.  Treaded socks in place, call light within reach and staff numbers provided.  Pt needs met at this time.

## 2021-03-05 NOTE — CARE PLAN
Problem: Communication  Goal: The ability to communicate needs accurately and effectively will improve  Outcome: PROGRESSING AS EXPECTED   Pt is A&Ox4. Pt makes needs know to sitter.     Problem: Safety  Goal: Will remain free from injury  Outcome: PROGRESSING AS EXPECTED   Pt bed is locked and in the lowest position. Call light and personal belongings within reach. Pt has 1:1 sitter in room.

## 2021-03-06 LAB
ANION GAP SERPL CALC-SCNC: 13 MMOL/L (ref 7–16)
BASOPHILS # BLD AUTO: 0.8 % (ref 0–1.8)
BASOPHILS # BLD: 0.07 K/UL (ref 0–0.12)
BUN SERPL-MCNC: 29 MG/DL (ref 8–22)
CALCIUM SERPL-MCNC: 9 MG/DL (ref 8.5–10.5)
CHLORIDE SERPL-SCNC: 99 MMOL/L (ref 96–112)
CO2 SERPL-SCNC: 19 MMOL/L (ref 20–33)
CREAT SERPL-MCNC: 0.99 MG/DL (ref 0.5–1.4)
EOSINOPHIL # BLD AUTO: 0.37 K/UL (ref 0–0.51)
EOSINOPHIL NFR BLD: 4.2 % (ref 0–6.9)
ERYTHROCYTE [DISTWIDTH] IN BLOOD BY AUTOMATED COUNT: 44.8 FL (ref 35.9–50)
GLUCOSE BLD-MCNC: 111 MG/DL (ref 65–99)
GLUCOSE BLD-MCNC: 111 MG/DL (ref 65–99)
GLUCOSE BLD-MCNC: 125 MG/DL (ref 65–99)
GLUCOSE BLD-MCNC: 99 MG/DL (ref 65–99)
GLUCOSE SERPL-MCNC: 111 MG/DL (ref 65–99)
HCT VFR BLD AUTO: 48.8 % (ref 42–52)
HGB BLD-MCNC: 16 G/DL (ref 14–18)
IMM GRANULOCYTES # BLD AUTO: 0.03 K/UL (ref 0–0.11)
IMM GRANULOCYTES NFR BLD AUTO: 0.3 % (ref 0–0.9)
LYMPHOCYTES # BLD AUTO: 1.23 K/UL (ref 1–4.8)
LYMPHOCYTES NFR BLD: 14 % (ref 22–41)
MAGNESIUM SERPL-MCNC: 1.6 MG/DL (ref 1.5–2.5)
MCH RBC QN AUTO: 28.4 PG (ref 27–33)
MCHC RBC AUTO-ENTMCNC: 32.8 G/DL (ref 33.7–35.3)
MCV RBC AUTO: 86.7 FL (ref 81.4–97.8)
MONOCYTES # BLD AUTO: 1.73 K/UL (ref 0–0.85)
MONOCYTES NFR BLD AUTO: 19.7 % (ref 0–13.4)
NEUTROPHILS # BLD AUTO: 5.37 K/UL (ref 1.82–7.42)
NEUTROPHILS NFR BLD: 61 % (ref 44–72)
NRBC # BLD AUTO: 0 K/UL
NRBC BLD-RTO: 0 /100 WBC
NT-PROBNP SERPL IA-MCNC: 3900 PG/ML (ref 0–125)
OSMOLALITY SERPL: 291 MOSM/KG H2O (ref 278–298)
PHOSPHATE SERPL-MCNC: 3.3 MG/DL (ref 2.5–4.5)
PLATELET # BLD AUTO: 214 K/UL (ref 164–446)
PMV BLD AUTO: 11 FL (ref 9–12.9)
POTASSIUM SERPL-SCNC: 3.8 MMOL/L (ref 3.6–5.5)
RBC # BLD AUTO: 5.63 M/UL (ref 4.7–6.1)
SODIUM SERPL-SCNC: 131 MMOL/L (ref 135–145)
SODIUM UR-SCNC: 23 MMOL/L
WBC # BLD AUTO: 8.8 K/UL (ref 4.8–10.8)

## 2021-03-06 PROCEDURE — 83880 ASSAY OF NATRIURETIC PEPTIDE: CPT

## 2021-03-06 PROCEDURE — 83930 ASSAY OF BLOOD OSMOLALITY: CPT

## 2021-03-06 PROCEDURE — 80048 BASIC METABOLIC PNL TOTAL CA: CPT

## 2021-03-06 PROCEDURE — A9270 NON-COVERED ITEM OR SERVICE: HCPCS | Performed by: STUDENT IN AN ORGANIZED HEALTH CARE EDUCATION/TRAINING PROGRAM

## 2021-03-06 PROCEDURE — 700102 HCHG RX REV CODE 250 W/ 637 OVERRIDE(OP): Performed by: STUDENT IN AN ORGANIZED HEALTH CARE EDUCATION/TRAINING PROGRAM

## 2021-03-06 PROCEDURE — A9270 NON-COVERED ITEM OR SERVICE: HCPCS | Performed by: GENERAL PRACTICE

## 2021-03-06 PROCEDURE — 700102 HCHG RX REV CODE 250 W/ 637 OVERRIDE(OP): Performed by: GENERAL PRACTICE

## 2021-03-06 PROCEDURE — 700102 HCHG RX REV CODE 250 W/ 637 OVERRIDE(OP): Performed by: INTERNAL MEDICINE

## 2021-03-06 PROCEDURE — 99232 SBSQ HOSP IP/OBS MODERATE 35: CPT | Performed by: INTERNAL MEDICINE

## 2021-03-06 PROCEDURE — 84100 ASSAY OF PHOSPHORUS: CPT

## 2021-03-06 PROCEDURE — 36415 COLL VENOUS BLD VENIPUNCTURE: CPT

## 2021-03-06 PROCEDURE — 700102 HCHG RX REV CODE 250 W/ 637 OVERRIDE(OP): Performed by: HOSPITALIST

## 2021-03-06 PROCEDURE — A9270 NON-COVERED ITEM OR SERVICE: HCPCS | Performed by: PSYCHIATRY & NEUROLOGY

## 2021-03-06 PROCEDURE — 700102 HCHG RX REV CODE 250 W/ 637 OVERRIDE(OP): Performed by: PSYCHIATRY & NEUROLOGY

## 2021-03-06 PROCEDURE — 83735 ASSAY OF MAGNESIUM: CPT

## 2021-03-06 PROCEDURE — A9270 NON-COVERED ITEM OR SERVICE: HCPCS | Performed by: HOSPITALIST

## 2021-03-06 PROCEDURE — A9270 NON-COVERED ITEM OR SERVICE: HCPCS | Performed by: INTERNAL MEDICINE

## 2021-03-06 PROCEDURE — 82962 GLUCOSE BLOOD TEST: CPT | Mod: 91

## 2021-03-06 PROCEDURE — 770004 HCHG ROOM/CARE - ONCOLOGY PRIVATE *

## 2021-03-06 PROCEDURE — 85025 COMPLETE CBC W/AUTO DIFF WBC: CPT

## 2021-03-06 RX ORDER — POTASSIUM CHLORIDE 20 MEQ/1
20 TABLET, EXTENDED RELEASE ORAL ONCE
Status: COMPLETED | OUTPATIENT
Start: 2021-03-06 | End: 2021-03-06

## 2021-03-06 RX ADMIN — CLONAZEPAM 1 MG: 1 TABLET ORAL at 17:37

## 2021-03-06 RX ADMIN — ZIPRASIDONE HYDROCHLORIDE 60 MG: 60 CAPSULE ORAL at 20:07

## 2021-03-06 RX ADMIN — Medication 400 MG: at 08:49

## 2021-03-06 RX ADMIN — APIXABAN 5 MG: 5 TABLET, FILM COATED ORAL at 20:07

## 2021-03-06 RX ADMIN — ACETAMINOPHEN 650 MG: 325 TABLET, FILM COATED ORAL at 08:49

## 2021-03-06 RX ADMIN — ZIPRASIDONE HYDROCHLORIDE 60 MG: 60 CAPSULE ORAL at 08:50

## 2021-03-06 RX ADMIN — ACETAMINOPHEN 650 MG: 325 TABLET, FILM COATED ORAL at 13:52

## 2021-03-06 RX ADMIN — METOPROLOL SUCCINATE 25 MG: 25 TABLET, EXTENDED RELEASE ORAL at 04:23

## 2021-03-06 RX ADMIN — MIDODRINE HYDROCHLORIDE 5 MG: 5 TABLET ORAL at 17:37

## 2021-03-06 RX ADMIN — ACETAMINOPHEN 650 MG: 325 TABLET, FILM COATED ORAL at 20:07

## 2021-03-06 RX ADMIN — ATORVASTATIN CALCIUM 40 MG: 40 TABLET, FILM COATED ORAL at 17:38

## 2021-03-06 RX ADMIN — CLONAZEPAM 0.5 MG: 0.5 TABLET ORAL at 08:49

## 2021-03-06 RX ADMIN — PAROXETINE HYDROCHLORIDE 10 MG: 20 TABLET, FILM COATED ORAL at 08:49

## 2021-03-06 RX ADMIN — DIGOXIN 125 MCG: 125 TABLET ORAL at 17:37

## 2021-03-06 RX ADMIN — MIDODRINE HYDROCHLORIDE 5 MG: 5 TABLET ORAL at 08:50

## 2021-03-06 RX ADMIN — DOCUSATE SODIUM 50 MG AND SENNOSIDES 8.6 MG 2 TABLET: 8.6; 5 TABLET, FILM COATED ORAL at 08:49

## 2021-03-06 RX ADMIN — LEVOTHYROXINE SODIUM 112 MCG: 0.11 TABLET ORAL at 04:24

## 2021-03-06 RX ADMIN — POTASSIUM CHLORIDE 20 MEQ: 1500 TABLET, EXTENDED RELEASE ORAL at 08:50

## 2021-03-06 RX ADMIN — APIXABAN 5 MG: 5 TABLET, FILM COATED ORAL at 08:50

## 2021-03-06 ASSESSMENT — ENCOUNTER SYMPTOMS
BLURRED VISION: 0
DEPRESSION: 1
HEADACHES: 0
DIARRHEA: 0
FEVER: 0
PALPITATIONS: 1
SORE THROAT: 0
VOMITING: 0
NAUSEA: 1
CONSTIPATION: 0
DIZZINESS: 0
COUGH: 0
ABDOMINAL PAIN: 1
SHORTNESS OF BREATH: 0
CHILLS: 0
WEAKNESS: 0
FALLS: 0

## 2021-03-06 NOTE — CARE PLAN
Problem: Communication  Goal: The ability to communicate needs accurately and effectively will improve  Outcome: PROGRESSING SLOWER THAN EXPECTED  Note: Patient spent most of the shift sleeping. He does not speak much and does not really express needs. Staff to anticipate needs and provide care as needed.     Problem: Safety  Goal: Will remain free from injury  Outcome: PROGRESSING SLOWER THAN EXPECTED  Note: Patient has been in bed the entire shift. Has generalized weakness. Sitter at bedside. Sitter can call for assist.

## 2021-03-06 NOTE — PROGRESS NOTES
Aaox4, still verbalized SI by slitting wrist otherwise denies any discomfort, 1:1 sitter at all times, colostomy CDI with some gas and stool, POC discussed, needs attended.

## 2021-03-06 NOTE — CARE PLAN
Problem: Safety  Goal: Will remain free from injury  Note: 1:1 sitter at all times  Goal: Will remain free from falls  Note: Bed alarm on 1:1 sitter at all times

## 2021-03-06 NOTE — PROGRESS NOTES
This writer assumed care of patient at shift change after bedside report. Patient denied pain. Medications taken orally with no concern. Sitter present in room. Patient reports he continues to have suicidal ideation with a plan to slit his throat. Patient has no dangerous object in his room.Bed in locked and low position. Call light and belongings within reach. Patient refused dinner meal.

## 2021-03-06 NOTE — PROGRESS NOTES
[facility] Medicine Daily Progress Note    Date of Service  3/6/2021    Chief Complaint  56 y.o. male admitted 2/9/2021 with   Chief Complaint   Patient presents with   • Syncope     X1 this morning when standing up to go to bathroom         Hospital Course  Mr. Sebastián Castro is a 56 y.o. male history of atrial fibrillation secondary to hypercoagulable state, diabetes complicated by diabetic neuropathy, congestive heart failure, hyperlipidemia, orthostatic hypotension on fludrocortisone who presented on 2/9/2021 with syncopal episode.  Patient reported dizziness prior to syncope.  On presentation to the emergency room she was hypertensive and bradycardic.  Cardiology recommended stopping all AV sanket blocking agents.  He was also started on midodrine for hypotension.    The patient was started on digoxin and apixaban for atrial fibrillation.  Echocardiogram showed EF of 60%, improved from previous echocardiogram obtained on 11/2020.    Of note patient has history of a spleen injury complicated by perforation of splenic flexure in September 2020.  Status post colostomy and Interiano's pouch in November 2020.  He was at home taking care of his colostomy prior to most recent admission.  However he has become depressed, overwhelmed, and can no longer take care of his colostomy independently.    Psych continues to follow the patient; psychiatric medication as per psych recommendation.      Interval Problem Update  Patient was seen and examined at bedside.  I have personally reviewed vitals, labs, and imaging.    3/2.  Afebrile.  Stable vitals.  On room air.  Denies fever, chills, chest pains, shortness of breath, dizziness, lightheadedness, palpitations.  Still reports depression and suicidal ideations.  He is now coming to  with having an ostomy.  3/3.  Afebrile.  Stable vitals.  On room air.  Replete mag, potassium.  Denies fever, chills, chest pains, shortness of breath.  Still reports some depression and low energy.   Reports some suicidal ideations.  Does not feel that he can take care of his ostomy by himself but cannot give a reason as to why not.  Did speak with general surgery Dr. tSorm Gan who felt that reversible of his ostomy is not likely at this time.  3/4.  Afebrile.  Tachycardic this morning.  On room air.  Replete magnesium.  Hyponatremia is improved.  Patient has had nausea overnight and has not been able to keep down food and many medications.  He also had a bowel movement for the first time since his ostomy.  EKG this morning does show atrial fibrillation with a rate of 111.  He is on digoxin and apixaban.  Was previously on metoprolol and diltiazem for rate control but rate has been controlled without medications recently as well as he was having orthostatic hypotension and bradycardia.  Restart metoprolol.  Ordered for CT abdomen pelvis shows fluid distention in the colon.  QT is not prolonged.  We will continue to monitor nausea.  Started on IV fluids.  3/5.  Afebrile.  Remains in atrial fibrillation but rate controlled.  Hypotensive this morning.  Bump in kidney function this morning.  Replete magnesium, potassium.  Lactic acid within normal limits.  Patient has had poor oral intake and is still very nauseous.  Received fluid boluses overnight.  Will give 1 more bolus and ultrasound kidneys.  Patient denies dizziness, headedness, chest pains, shortness of breath.  He does report chronic palpitations attributed to his A. fib but nothing currently.  Still reports depression and low energy.  3/6.  Hypotension has resolved.  Episodes of tachycardia.  Afebrile.  On 1 L nasal cannula.  Hypervolemic hyponatremia.  Replete magnesium, potassium.  Kidney function is improved.  Denies fever, chills, chest pains, shortness of breath, dizziness, lightheadedness.  Still reports suicidal ideations.  Does report some nausea and abdominal pain.  Does not tolerate much food by mouth saying he is too nauseous.  He is  tolerating his medications.  Counseled him about his antinausea medications and encouraged p.o. intake    Consultants/Specialty  Psychiatry, cardiology    Code Status  Full Code    Disposition  To inpatient psychiatry    Review of Systems  Review of Systems   Constitutional: Negative for chills and fever.   HENT: Negative for congestion and sore throat.    Eyes: Negative for blurred vision.   Respiratory: Negative for cough and shortness of breath.    Cardiovascular: Positive for palpitations and leg swelling. Negative for chest pain.   Gastrointestinal: Positive for abdominal pain and nausea. Negative for constipation, diarrhea and vomiting.   Genitourinary: Negative for dysuria, frequency and urgency.   Musculoskeletal: Negative for falls.   Skin: Negative for rash.   Neurological: Negative for dizziness, weakness and headaches.   Psychiatric/Behavioral: Positive for depression and suicidal ideas.   All other systems reviewed and are negative.       Physical Exam  Temp:  [36.4 °C (97.5 °F)-36.7 °C (98 °F)] 36.5 °C (97.7 °F)  Pulse:  [] 93  Resp:  [16-18] 18  BP: (102-126)/(64-78) 121/78  SpO2:  [94 %-100 %] 94 %    Physical Exam  Vitals and nursing note reviewed.   Constitutional:       General: He is not in acute distress.     Appearance: Normal appearance. He is obese.   HENT:      Head: Normocephalic and atraumatic.      Right Ear: External ear normal.      Left Ear: External ear normal.      Nose: Nose normal.      Mouth/Throat:      Mouth: Mucous membranes are moist.      Pharynx: Oropharynx is clear.   Eyes:      Extraocular Movements: Extraocular movements intact.      Conjunctiva/sclera: Conjunctivae normal.   Cardiovascular:      Rate and Rhythm: Regular rhythm. Tachycardia present.      Pulses: Normal pulses.      Heart sounds: Normal heart sounds.   Pulmonary:      Effort: Pulmonary effort is normal. No respiratory distress.      Breath sounds: Normal breath sounds. No wheezing.   Abdominal:       General: Abdomen is flat. Bowel sounds are normal. There is no distension.      Palpations: Abdomen is soft.      Tenderness: There is no abdominal tenderness.      Comments: Does have ostomy in place  Also noted to have wound  With granulation tissue    Musculoskeletal:         General: Normal range of motion.      Cervical back: Normal range of motion.      Right lower leg: Edema present.      Left lower leg: Edema present.      Comments: Varicose veins and swelling of lower extremities   Skin:     General: Skin is warm.   Neurological:      General: No focal deficit present.      Mental Status: He is alert and oriented to person, place, and time. Mental status is at baseline.      Cranial Nerves: No cranial nerve deficit.      Motor: No weakness.   Psychiatric:         Behavior: Behavior normal.      Comments: Depressed mood         Fluids    Intake/Output Summary (Last 24 hours) at 3/6/2021 0806  Last data filed at 3/6/2021 0617  Gross per 24 hour   Intake 0 ml   Output 470 ml   Net -470 ml       Laboratory  Recent Labs     03/05/21  0006 03/06/21  0012   WBC 11.6* 8.8   RBC 5.65 5.63   HEMOGLOBIN 16.2 16.0   HEMATOCRIT 49.1 48.8   MCV 86.9 86.7   MCH 28.7 28.4   MCHC 33.0* 32.8*   RDW 44.9 44.8   PLATELETCT 212 214   MPV 11.5 11.0     Recent Labs     03/04/21  0009 03/05/21  0006 03/06/21  0012   SODIUM 133* 130* 131*   POTASSIUM 3.9 3.9 3.8   CHLORIDE 98 98 99   CO2 21 22 19*   GLUCOSE 147* 130* 111*   BUN 26* 35* 29*   CREATININE 0.98 1.21 0.99   CALCIUM 9.8 9.2 9.0                   Imaging  US-RENAL   Final Result      No evidence of hydronephrosis.      Lobulated kidneys bilaterally.      CT-ABDOMEN-PELVIS WITH   Final Result      1.  There is no evidence of small bowel obstruction.   2.  There is a left lower quadrant ostomy.   3.  There is fluid distention of the colon distal to the surgical material in the left mid descending colon extending all the way to the rectum. There is no pneumatosis or free  air.   4.  There is cholelithiasis without biliary dilatation.   5.  There has been interval removal of the drainage catheter anterior to the spleen with minimal hypodense area in the anterior spleen again noted. There is no new fluid collection.      IR-US GUIDED PIV   Final Result    Ultrasound-guided PERIPHERAL IV INSERTION performed by    qualified nursing staff as above.      EC-ECHOCARDIOGRAM COMPLETE W/O CONT   Final Result      DX-LUMBAR SPINE-2 OR 3 VIEWS   Final Result      Moderate compression deformity of T11 is new compared to 2018.      Mild wedge deformity of T12 is unchanged.      Degenerative changes including facet arthropathy.      Mild retrolisthesis of L5 on S1 and L3 on L4.              Assessment/Plan  * Suicidal ideation  Assessment & Plan  Has history of MDD, recurrent with psychotic features with active SI w/ plan.   --Psych following, continue cycling medication as per psych recommendation.    Major depressive disorder, recurrent, severe with psychotic features (HCC)- (present on admission)  Assessment & Plan  -Psychiatry and Psychology following   -Psych medication as per psych recommendation    Colostomy present (HCC)- (present on admission)  Assessment & Plan  History of fall at home Nov 2020 on left side with perforated colon and splenic fluid collection/hematoma s/p segmental colectomy, colostomy, mobilization of the splenic flexure, wound vac, and I&D Nov 2020.   -Continue to provide education for colostomy care.  Patient must be independently able to care for his ostomy before inpatient psychiatry will accept him  I did speak with [facility].  Reversal of ostomy is unlikely if it has not been 6 months.  Also needs further work-up such as a colonoscopy prior to reversal.    Orthostatic hypotension- (present on admission)  Assessment & Plan  Multifactorial including medications (beta blocker, CCB, tamsulosin), diabetic autonomic dysfunction, and venous insufficiency   --EF  is noted to be 60%, significant improvement from prior.   --Per cardiology, no further cardiac work-up needed  -Patient need to follow-up with cardiology as an outpatient.    Had to increase midodrine again and give fluid boluses.  Questionable history of adrenal insufficiency on Florinef on admission.  Patient is unaware this and does not know what Florinef is.  Random cortisol and stim test not consistent with adrenal insufficiency  Patient has had poor oral intake secondary to nausea and is getting fluid boluses.  Kidneys appear normal on imaging.  Monitor kidney function and urine output    Syncope  Assessment & Plan  Likely secondary to orthostatic hypotension  Echo normal with 60% ejection fraction, improved from prior    Paroxysmal atrial fibrillation ([facility])  Assessment & Plan  CHADSVASC 3 (HTN, CHF, DM). Rates 60s-70s w/out beta blockade. TTE Feb 2021: EF 60%,   -Continue digoxin 0.125 mg p.o. daily, Eliquis 5 mg p.o. twice daily  Restart metoprolol XL for rate control    Chronic HFrEF (heart failure with reduced ejection fraction) ([facility])  Assessment & Plan  EF 60% on repeat echo.  Patient is currently euvolemic.    I/Os, daily weight and fluid restriction to 1.5 L  Continue metoprolol XL  Getting fluid boluses for hypotension as above.  Continue to monitor fluid status    Diabetes mellitus type 2, insulin dependent ([facility])  Assessment & Plan  Lab Results   Component Value Date/Time    HBA1C 5.2 02/14/2021 0130    HBA1C 7.8 (H) 11/10/2020 0331    HBA1C 7.7 (H) 10/22/2019 0823     Results from last 7 days   Lab Units 03/06/21  0855 03/05/21  1654 03/05/21  1205 03/05/21  0807 03/04/21  1720 03/04/21  1244 03/04/21  0736 03/03/21  2122   ACCU CHECK GLUCOSE 788 mg/dL 111* 102* 117* 113* 146* 147* 167* 129*     I have ordered insulin sliding scale with D50 and glucagon for hypoglycemia per protocol.  Diabetic diet  Diabetic education    Hypothyroidism- (present on admission)  Assessment & Plan  TSH wnl Feb  2021  -continue home Levothyroxine    Essential hypertension- (present on admission)  Assessment & Plan  Blood pressure well controlled, monitor.    Has been hypotensive  Midodrine was increased and fluid boluses as above.    Lower limb amputation, great toe (HCC)- (present on admission)  Assessment & Plan  s/p right great toe amputation 2018    Elevated troponin  Assessment & Plan  RESOLVED  Stable. Suspect due to demand ischemia. No need to trend further.    Debility- (present on admission)  Assessment & Plan  -PT/OT evaluated and appreciated recs    Chronic venous insufficiency of lower extremity  Assessment & Plan  -elevated legs  -compression stockings  -lotion, soap/water to legs  -weight loss (BMI 38)    Nonischemic cardiomyopathy (HCC)- (present on admission)  Assessment & Plan  Normal coronaries, 2018.Tachycardia mediated  -LVEF improved, based on echo 2/10/2021 LVEF 60%    Mixed hyperlipidemia- (present on admission)  Assessment & Plan  LDL 82 Feb 2021  -repeat lipid panel  -continue Atorvatatin    Obesity- (present on admission)  Assessment & Plan  Body mass index is 37.79 kg/m².  Counseled about diet and exercise       VTE prophylaxis:  eliquis

## 2021-03-07 LAB
ANION GAP SERPL CALC-SCNC: 11 MMOL/L (ref 7–16)
BASOPHILS # BLD AUTO: 0.7 % (ref 0–1.8)
BASOPHILS # BLD: 0.07 K/UL (ref 0–0.12)
BUN SERPL-MCNC: 29 MG/DL (ref 8–22)
CALCIUM SERPL-MCNC: 9 MG/DL (ref 8.5–10.5)
CHLORIDE SERPL-SCNC: 100 MMOL/L (ref 96–112)
CO2 SERPL-SCNC: 19 MMOL/L (ref 20–33)
CREAT SERPL-MCNC: 0.96 MG/DL (ref 0.5–1.4)
EOSINOPHIL # BLD AUTO: 0.46 K/UL (ref 0–0.51)
EOSINOPHIL NFR BLD: 4.8 % (ref 0–6.9)
ERYTHROCYTE [DISTWIDTH] IN BLOOD BY AUTOMATED COUNT: 44.4 FL (ref 35.9–50)
GLUCOSE BLD-MCNC: 108 MG/DL (ref 65–99)
GLUCOSE BLD-MCNC: 111 MG/DL (ref 65–99)
GLUCOSE BLD-MCNC: 97 MG/DL (ref 65–99)
GLUCOSE SERPL-MCNC: 113 MG/DL (ref 65–99)
HCT VFR BLD AUTO: 47.2 % (ref 42–52)
HGB BLD-MCNC: 15.7 G/DL (ref 14–18)
IMM GRANULOCYTES # BLD AUTO: 0.05 K/UL (ref 0–0.11)
IMM GRANULOCYTES NFR BLD AUTO: 0.5 % (ref 0–0.9)
LYMPHOCYTES # BLD AUTO: 2 K/UL (ref 1–4.8)
LYMPHOCYTES NFR BLD: 20.9 % (ref 22–41)
MAGNESIUM SERPL-MCNC: 1.7 MG/DL (ref 1.5–2.5)
MCH RBC QN AUTO: 28.6 PG (ref 27–33)
MCHC RBC AUTO-ENTMCNC: 33.3 G/DL (ref 33.7–35.3)
MCV RBC AUTO: 86.1 FL (ref 81.4–97.8)
MONOCYTES # BLD AUTO: 1.66 K/UL (ref 0–0.85)
MONOCYTES NFR BLD AUTO: 17.4 % (ref 0–13.4)
NEUTROPHILS # BLD AUTO: 5.31 K/UL (ref 1.82–7.42)
NEUTROPHILS NFR BLD: 55.7 % (ref 44–72)
NRBC # BLD AUTO: 0 K/UL
NRBC BLD-RTO: 0 /100 WBC
PHOSPHATE SERPL-MCNC: 2.6 MG/DL (ref 2.5–4.5)
PLATELET # BLD AUTO: 216 K/UL (ref 164–446)
PMV BLD AUTO: 11 FL (ref 9–12.9)
POTASSIUM SERPL-SCNC: 3.8 MMOL/L (ref 3.6–5.5)
RBC # BLD AUTO: 5.48 M/UL (ref 4.7–6.1)
SODIUM SERPL-SCNC: 130 MMOL/L (ref 135–145)
WBC # BLD AUTO: 9.6 K/UL (ref 4.8–10.8)

## 2021-03-07 PROCEDURE — A9270 NON-COVERED ITEM OR SERVICE: HCPCS | Performed by: HOSPITALIST

## 2021-03-07 PROCEDURE — 700102 HCHG RX REV CODE 250 W/ 637 OVERRIDE(OP): Performed by: HOSPITALIST

## 2021-03-07 PROCEDURE — 85025 COMPLETE CBC W/AUTO DIFF WBC: CPT

## 2021-03-07 PROCEDURE — 99232 SBSQ HOSP IP/OBS MODERATE 35: CPT | Performed by: PSYCHIATRY & NEUROLOGY

## 2021-03-07 PROCEDURE — A9270 NON-COVERED ITEM OR SERVICE: HCPCS | Performed by: PSYCHIATRY & NEUROLOGY

## 2021-03-07 PROCEDURE — 700102 HCHG RX REV CODE 250 W/ 637 OVERRIDE(OP): Performed by: PSYCHIATRY & NEUROLOGY

## 2021-03-07 PROCEDURE — 83735 ASSAY OF MAGNESIUM: CPT

## 2021-03-07 PROCEDURE — 90832 PSYTX W PT 30 MINUTES: CPT | Performed by: PSYCHOLOGIST

## 2021-03-07 PROCEDURE — 700102 HCHG RX REV CODE 250 W/ 637 OVERRIDE(OP): Performed by: INTERNAL MEDICINE

## 2021-03-07 PROCEDURE — 80048 BASIC METABOLIC PNL TOTAL CA: CPT

## 2021-03-07 PROCEDURE — 770004 HCHG ROOM/CARE - ONCOLOGY PRIVATE *

## 2021-03-07 PROCEDURE — 700102 HCHG RX REV CODE 250 W/ 637 OVERRIDE(OP): Performed by: GENERAL PRACTICE

## 2021-03-07 PROCEDURE — A9270 NON-COVERED ITEM OR SERVICE: HCPCS | Performed by: INTERNAL MEDICINE

## 2021-03-07 PROCEDURE — 36415 COLL VENOUS BLD VENIPUNCTURE: CPT

## 2021-03-07 PROCEDURE — A9270 NON-COVERED ITEM OR SERVICE: HCPCS | Performed by: GENERAL PRACTICE

## 2021-03-07 PROCEDURE — 84100 ASSAY OF PHOSPHORUS: CPT

## 2021-03-07 PROCEDURE — 99232 SBSQ HOSP IP/OBS MODERATE 35: CPT | Performed by: INTERNAL MEDICINE

## 2021-03-07 PROCEDURE — 82962 GLUCOSE BLOOD TEST: CPT

## 2021-03-07 RX ORDER — PAROXETINE HYDROCHLORIDE 20 MG/1
20 TABLET, FILM COATED ORAL DAILY
Status: DISCONTINUED | OUTPATIENT
Start: 2021-03-08 | End: 2021-07-09 | Stop reason: HOSPADM

## 2021-03-07 RX ADMIN — CLONAZEPAM 0.5 MG: 0.5 TABLET ORAL at 09:53

## 2021-03-07 RX ADMIN — Medication 400 MG: at 09:53

## 2021-03-07 RX ADMIN — APIXABAN 5 MG: 5 TABLET, FILM COATED ORAL at 09:54

## 2021-03-07 RX ADMIN — ZIPRASIDONE HYDROCHLORIDE 60 MG: 60 CAPSULE ORAL at 09:53

## 2021-03-07 RX ADMIN — ZIPRASIDONE HYDROCHLORIDE 60 MG: 60 CAPSULE ORAL at 21:37

## 2021-03-07 RX ADMIN — LEVOTHYROXINE SODIUM 112 MCG: 0.11 TABLET ORAL at 05:16

## 2021-03-07 RX ADMIN — CLONAZEPAM 1 MG: 1 TABLET ORAL at 17:25

## 2021-03-07 RX ADMIN — APIXABAN 5 MG: 5 TABLET, FILM COATED ORAL at 21:37

## 2021-03-07 RX ADMIN — DIBASIC SODIUM PHOSPHATE, MONOBASIC POTASSIUM PHOSPHATE AND MONOBASIC SODIUM PHOSPHATE 500 MG: 852; 155; 130 TABLET ORAL at 09:53

## 2021-03-07 RX ADMIN — PAROXETINE HYDROCHLORIDE 10 MG: 20 TABLET, FILM COATED ORAL at 09:53

## 2021-03-07 RX ADMIN — ATORVASTATIN CALCIUM 40 MG: 40 TABLET, FILM COATED ORAL at 17:25

## 2021-03-07 RX ADMIN — DIGOXIN 125 MCG: 125 TABLET ORAL at 17:25

## 2021-03-07 RX ADMIN — MIDODRINE HYDROCHLORIDE 5 MG: 5 TABLET ORAL at 09:54

## 2021-03-07 ASSESSMENT — ENCOUNTER SYMPTOMS
DIARRHEA: 0
DEPRESSION: 1
ABDOMINAL PAIN: 1
FALLS: 0
DIZZINESS: 0
SHORTNESS OF BREATH: 0
COUGH: 0
BLURRED VISION: 0
PALPITATIONS: 1
NAUSEA: 1
SORE THROAT: 0
HEADACHES: 0
CHILLS: 0
FEVER: 0
CONSTIPATION: 0
VOMITING: 0
WEAKNESS: 0

## 2021-03-07 ASSESSMENT — PAIN DESCRIPTION - PAIN TYPE
TYPE: ACUTE PAIN
TYPE: ACUTE PAIN

## 2021-03-07 NOTE — CARE PLAN
Problem: Communication  Goal: The ability to communicate needs accurately and effectively will improve  Outcome: PROGRESSING AS EXPECTED   No barriers.   Problem: Safety  Goal: Will remain free from injury  Outcome: PROGRESSING AS EXPECTED   Precautions in place.   Problem: Infection  Goal: Will remain free from infection  Outcome: PROGRESSING AS EXPECTED   Hygiene education provided.

## 2021-03-07 NOTE — PROGRESS NOTES
[facility] Medicine Daily Progress Note    Date of Service  3/7/2021    Chief Complaint  56 y.o. male admitted 2/9/2021 with   Chief Complaint   Patient presents with   • Syncope     X1 this morning when standing up to go to bathroom         Hospital Course  Mr. Sebastián Castro is a 56 y.o. male history of atrial fibrillation secondary to hypercoagulable state, diabetes complicated by diabetic neuropathy, congestive heart failure, hyperlipidemia, orthostatic hypotension on fludrocortisone who presented on 2/9/2021 with syncopal episode.  Patient reported dizziness prior to syncope.  On presentation to the emergency room she was hypertensive and bradycardic.  Cardiology recommended stopping all AV sanket blocking agents.  He was also started on midodrine for hypotension.    The patient was started on digoxin and apixaban for atrial fibrillation.  Echocardiogram showed EF of 60%, improved from previous echocardiogram obtained on 11/2020.    Of note patient has history of a spleen injury complicated by perforation of splenic flexure in September 2020.  Status post colostomy and Interiano's pouch in November 2020.  He was at home taking care of his colostomy prior to most recent admission.  However he has become depressed, overwhelmed, and can no longer take care of his colostomy independently.    Psych continues to follow the patient; psychiatric medication as per psych recommendation.      Interval Problem Update  Patient was seen and examined at bedside.  I have personally reviewed vitals, labs, and imaging.    3/2.  Afebrile.  Stable vitals.  On room air.  Denies fever, chills, chest pains, shortness of breath, dizziness, lightheadedness, palpitations.  Still reports depression and suicidal ideations.  He is now coming to  with having an ostomy.  3/3.  Afebrile.  Stable vitals.  On room air.  Replete mag, potassium.  Denies fever, chills, chest pains, shortness of breath.  Still reports some depression and low energy.   Reports some suicidal ideations.  Does not feel that he can take care of his ostomy by himself but cannot give a reason as to why not.  Did speak with general surgery Dr. Storm Gan who felt that reversible of his ostomy is not likely at this time.  3/4.  Afebrile.  Tachycardic this morning.  On room air.  Replete magnesium.  Hyponatremia is improved.  Patient has had nausea overnight and has not been able to keep down food and many medications.  He also had a bowel movement for the first time since his ostomy.  EKG this morning does show atrial fibrillation with a rate of 111.  He is on digoxin and apixaban.  Was previously on metoprolol and diltiazem for rate control but rate has been controlled without medications recently as well as he was having orthostatic hypotension and bradycardia.  Restart metoprolol.  Ordered for CT abdomen pelvis shows fluid distention in the colon.  QT is not prolonged.  We will continue to monitor nausea.  Started on IV fluids.  3/5.  Afebrile.  Remains in atrial fibrillation but rate controlled.  Hypotensive this morning.  Bump in kidney function this morning.  Replete magnesium, potassium.  Lactic acid within normal limits.  Patient has had poor oral intake and is still very nauseous.  Received fluid boluses overnight.  Will give 1 more bolus and ultrasound kidneys.  Patient denies dizziness, headedness, chest pains, shortness of breath.  He does report chronic palpitations attributed to his A. fib but nothing currently.  Still reports depression and low energy.  3/6.  Hypotension has resolved.  Episodes of tachycardia.  Afebrile.  On 1 L nasal cannula.  Hypervolemic hyponatremia.  Replete magnesium, potassium.  Kidney function is improved.  Denies fever, chills, chest pains, shortness of breath, dizziness, lightheadedness.  Still reports suicidal ideations.  Does report some nausea and abdominal pain.  Does not tolerate much food by mouth saying he is too nauseous.  He is  tolerating his medications.  Counseled him about his antinausea medications and encouraged p.o. intake  3/7.  Afebrile.  Episode of hypotension overnight.  On 1 L nasal cannula.  Replete mag, K-Phos.  Denies fever, chills, chest pains, shortness of breath, dizziness, lightheadedness.  Still nauseous with decreased p.o. intake.  Still reports depression.    Consultants/Specialty  Psychiatry, cardiology    Code Status  Full Code    Disposition  To inpatient psychiatry    Review of Systems  Review of Systems   Constitutional: Negative for chills and fever.   HENT: Negative for congestion and sore throat.    Eyes: Negative for blurred vision.   Respiratory: Negative for cough and shortness of breath.    Cardiovascular: Positive for palpitations and leg swelling. Negative for chest pain.   Gastrointestinal: Positive for abdominal pain and nausea. Negative for constipation, diarrhea and vomiting.   Genitourinary: Negative for dysuria, frequency and urgency.   Musculoskeletal: Negative for falls.   Skin: Negative for rash.   Neurological: Negative for dizziness, weakness and headaches.   Psychiatric/Behavioral: Positive for depression and suicidal ideas.   All other systems reviewed and are negative.       Physical Exam  Temp:  [36.1 °C (96.9 °F)-37.1 °C (98.8 °F)] 36.3 °C (97.4 °F)  Pulse:  [] 94  Resp:  [18-20] 18  BP: ()/(54-87) 113/54  SpO2:  [95 %-98 %] 98 %    Physical Exam  Vitals and nursing note reviewed.   Constitutional:       General: He is not in acute distress.     Appearance: Normal appearance. He is obese.   HENT:      Head: Normocephalic and atraumatic.      Right Ear: External ear normal.      Left Ear: External ear normal.      Nose: Nose normal.      Mouth/Throat:      Mouth: Mucous membranes are moist.      Pharynx: Oropharynx is clear.   Eyes:      Extraocular Movements: Extraocular movements intact.      Conjunctiva/sclera: Conjunctivae normal.   Cardiovascular:      Rate and Rhythm:  Normal rate and regular rhythm.      Pulses: Normal pulses.      Heart sounds: Normal heart sounds.   Pulmonary:      Effort: Pulmonary effort is normal. No respiratory distress.      Breath sounds: Normal breath sounds. No wheezing.   Abdominal:      General: Abdomen is flat. Bowel sounds are normal. There is no distension.      Palpations: Abdomen is soft.      Tenderness: There is no abdominal tenderness.      Comments: Does have ostomy in place  Also noted to have wound  With granulation tissue    Musculoskeletal:         General: Normal range of motion.      Cervical back: Normal range of motion.      Right lower leg: Edema present.      Left lower leg: Edema present.      Comments: Varicose veins and swelling of lower extremities   Skin:     General: Skin is warm.   Neurological:      General: No focal deficit present.      Mental Status: He is alert and oriented to person, place, and time. Mental status is at baseline.      Cranial Nerves: No cranial nerve deficit.      Motor: No weakness.   Psychiatric:         Behavior: Behavior normal.      Comments: Depressed mood         Fluids    Intake/Output Summary (Last 24 hours) at 3/7/2021 0805  Last data filed at 3/7/2021 0600  Gross per 24 hour   Intake 1140 ml   Output 850 ml   Net 290 ml       Laboratory  Recent Labs     03/05/21  0006 03/06/21  0012 03/07/21  0009   WBC 11.6* 8.8 9.6   RBC 5.65 5.63 5.48   HEMOGLOBIN 16.2 16.0 15.7   HEMATOCRIT 49.1 48.8 47.2   MCV 86.9 86.7 86.1   MCH 28.7 28.4 28.6   MCHC 33.0* 32.8* 33.3*   RDW 44.9 44.8 44.4   PLATELETCT 212 214 216   MPV 11.5 11.0 11.0     Recent Labs     03/05/21  0006 03/06/21  0012 03/07/21  0009   SODIUM 130* 131* 130*   POTASSIUM 3.9 3.8 3.8   CHLORIDE 98 99 100   CO2 22 19* 19*   GLUCOSE 130* 111* 113*   BUN 35* 29* 29*   CREATININE 1.21 0.99 0.96   CALCIUM 9.2 9.0 9.0                   Imaging  US-RENAL   Final Result      No evidence of hydronephrosis.      Lobulated kidneys bilaterally.       CT-ABDOMEN-PELVIS WITH   Final Result      1.  There is no evidence of small bowel obstruction.   2.  There is a left lower quadrant ostomy.   3.  There is fluid distention of the colon distal to the surgical material in the left mid descending colon extending all the way to the rectum. There is no pneumatosis or free air.   4.  There is cholelithiasis without biliary dilatation.   5.  There has been interval removal of the drainage catheter anterior to the spleen with minimal hypodense area in the anterior spleen again noted. There is no new fluid collection.      IR-US GUIDED PIV   Final Result    Ultrasound-guided PERIPHERAL IV INSERTION performed by    qualified nursing staff as above.      EC-ECHOCARDIOGRAM COMPLETE W/O CONT   Final Result      DX-LUMBAR SPINE-2 OR 3 VIEWS   Final Result      Moderate compression deformity of T11 is new compared to 2018.      Mild wedge deformity of T12 is unchanged.      Degenerative changes including facet arthropathy.      Mild retrolisthesis of L5 on S1 and L3 on L4.              Assessment/Plan  * Suicidal ideation  Assessment & Plan  Has history of MDD, recurrent with psychotic features with active SI w/ plan.   --Psych following, continue cycling medication as per psych recommendation.    Major depressive disorder, recurrent, severe with psychotic features (HCC)- (present on admission)  Assessment & Plan  -Psychiatry and Psychology following   -Psych medication as per psych recommendation    Colostomy present (HCC)- (present on admission)  Assessment & Plan  History of fall at home Nov 2020 on left side with perforated colon and splenic fluid collection/hematoma s/p segmental colectomy, colostomy, mobilization of the splenic flexure, wound vac, and I&D Nov 2020.   -Continue to provide education for colostomy care.  Patient must be independently able to care for his ostomy before inpatient psychiatry will accept him  I did speak with [facility].  Reversal of  ostomy is unlikely if it has not been 6 months.  Also needs further work-up such as a colonoscopy prior to reversal.    Orthostatic hypotension- (present on admission)  Assessment & Plan  Multifactorial including medications (beta blocker, CCB, tamsulosin), diabetic autonomic dysfunction, and venous insufficiency   --EF is noted to be 60%, significant improvement from prior.   --Per cardiology, no further cardiac work-up needed  -Patient need to follow-up with cardiology as an outpatient.    Had to increase midodrine again and give fluid boluses.  Questionable history of adrenal insufficiency on Florinef on admission.  Patient is unaware this and does not know what Florinef is.  Random cortisol and stim test not consistent with adrenal insufficiency  Patient has had poor oral intake secondary to nausea and is getting fluid boluses.  Kidneys appear normal on imaging.  Monitor kidney function and urine output    Syncope  Assessment & Plan  Likely secondary to orthostatic hypotension  Echo normal with 60% ejection fraction, improved from prior    Paroxysmal atrial fibrillation (HCC)  Assessment & Plan  CHADSVASC 3 (HTN, CHF, DM). Rates 60s-70s w/out beta blockade. TTE Feb 2021: EF 60%,   -Continue digoxin 0.125 mg p.o. daily, Eliquis 5 mg p.o. twice daily  Restart metoprolol XL for rate control    Chronic HFrEF (heart failure with reduced ejection fraction) ([facility])  Assessment & Plan  EF 60% on repeat echo.  Patient is currently euvolemic.    I/Os, daily weight and fluid restriction to 1.5 L  Continue metoprolol XL  Getting fluid boluses for hypotension as above.  Continue to monitor fluid status    Diabetes mellitus type 2, insulin dependent ([facility])  Assessment & Plan  Lab Results   Component Value Date/Time    HBA1C 5.2 02/14/2021 0130    HBA1C 7.8 (H) 11/10/2020 0331    HBA1C 7.7 (H) 10/22/2019 0823     Results from last 7 days   Lab Units 03/07/21  0806 03/06/21 2012 03/06/21  1729 03/06/21  1256 03/06/21  0855  03/05/21  1654 03/05/21  1205 03/05/21  0807   ACCU CHECK GLUCOSE 788 mg/dL 108* 111* 125* 99 111* 102* 117* 113*     I have ordered insulin sliding scale with D50 and glucagon for hypoglycemia per protocol.  Diabetic diet  Diabetic education    Hypothyroidism- (present on admission)  Assessment & Plan  TSH wnl Feb 2021  -continue home Levothyroxine    Essential hypertension- (present on admission)  Assessment & Plan  Blood pressure well controlled, monitor.    Has been hypotensive  Midodrine was increased and fluid boluses as above.    Lower limb amputation, great toe (HCC)- (present on admission)  Assessment & Plan  s/p right great toe amputation 2018    Elevated troponin  Assessment & Plan  RESOLVED  Stable. Suspect due to demand ischemia. No need to trend further.    Debility- (present on admission)  Assessment & Plan  -PT/OT evaluated and appreciated recs    Chronic venous insufficiency of lower extremity  Assessment & Plan  -elevated legs  -compression stockings  -lotion, soap/water to legs  -weight loss (BMI 38)    Nonischemic cardiomyopathy (HCC)- (present on admission)  Assessment & Plan  Normal coronaries, 2018.Tachycardia mediated  -LVEF improved, based on echo 2/10/2021 LVEF 60%    Mixed hyperlipidemia- (present on admission)  Assessment & Plan  LDL 82 Feb 2021  -repeat lipid panel  -continue Atorvatatin    Obesity- (present on admission)  Assessment & Plan  Body mass index is 37.79 kg/m².  Counseled about diet and exercise       VTE prophylaxis:  eliquis

## 2021-03-07 NOTE — PROGRESS NOTES
C/o incontinent of stool via rectal area, MD made aware, this had happened before and CT abdomen was done, will monitor as planned.

## 2021-03-07 NOTE — CONSULTS
"PSYCHOLOGICAL FOLLOW-UP:  Reason for admission: Syncope and collapse [R55]  Length of Visit: 30min    Legal status: Legal Status: Involuntary    Chief Complaint: \"Not too good\"    HPI: Met with the patient for brief individual psychotherapy. Patient presented with a tired affect and reported a \"not too good\" mood. He was laying in bed and appeared pale and very tired. Nevertheless, the patient did participate in a brief session focused on continuing to cope with depressive and anxious symptoms. The patient reported feeling embarrassed and shame when nursing had to help clean him up after having a bowel movement in bed. Reflected the patient's feelings and reminded him of the right to medical care that he has as a human being. The patient also shared his attempts at \"thinking positively\" and \"pushing away\" bad thoughts. Reminded the patient of the increased energy it takes to push away thoughts and change thoughts. Encouraged him to notice his thoughts without trying to change them while also focusing on what he can do in the hear and now to cope. Taught him the skill of observe and describe.     Psychiatric Examination:  Vitals: Blood pressure 138/73, pulse 84, temperature 36.6 °C (97.9 °F), temperature source Temporal, resp. rate 16, height 1.829 m (6'), weight (!) 126 kg (278 lb 10.6 oz), SpO2 96 %.  Musculoskeletal: normal psychomotor activity, no tics or unusual mannerisms noted  Appearance and Eye Contact: appropriate dress and grooming. Behavior is calm, cooperative,  appropriate eye contact  Attention/Alertness: Alert  Thought Process: Linear, Logical and Goal Directed    Thought Content: No psychotic processes noted  Speech: Clear with normal rate and rhythm  Mood: \"not too good\"            Affect: tired         SI/HI: Did not report, is trying to take it \"one day at a time\" demonstrating future orientation    Memory: Recent and remote memory appear intact    Orientation: alert, oriented to person, place and " time  Insight into symptoms: fair  Judgement into symptoms:fair    ASSESSMENT: While the patient did not endorse or deny suicidality today, he is still experiencing medical issues that are contributing to his depressive and anxious mood and making it more difficult to cope with suicidality. Furthermore, per chart review, the patient continues to endorse suicidal thoughts with plans to kill himself. Therefore, he remains at high risk and the legal hold remains extended.     DSM5 Diagnostic Considerations:   Major Depressive Disorder, recurrent, severe, with psychotic features  Unspecified Anxiety Disorder      PLAN:  Legal status: Involuntary   1:1 sitter  No changes to legal hold precautions  Anticipate F/U by psychiatry team within 72 hours  Records reviewed: yes  Discussed patient with other provider: yes, team psychiatrist  Will continue to follow  Thank you for the consult.    Madhuri Fernandez, Ph.D.

## 2021-03-07 NOTE — PROGRESS NOTES
Received bedside report from ELISA Shaw  Assumed care of pt [id number]  Pt awake and alert, reports all needs to be met.   Communication board updated.

## 2021-03-08 LAB
ANION GAP SERPL CALC-SCNC: 9 MMOL/L (ref 7–16)
BUN SERPL-MCNC: 23 MG/DL (ref 8–22)
CALCIUM SERPL-MCNC: 8.7 MG/DL (ref 8.5–10.5)
CHLORIDE SERPL-SCNC: 107 MMOL/L (ref 96–112)
CO2 SERPL-SCNC: 21 MMOL/L (ref 20–33)
CREAT SERPL-MCNC: 0.81 MG/DL (ref 0.5–1.4)
GLUCOSE BLD-MCNC: 111 MG/DL (ref 65–99)
GLUCOSE BLD-MCNC: 120 MG/DL (ref 65–99)
GLUCOSE BLD-MCNC: 94 MG/DL (ref 65–99)
GLUCOSE SERPL-MCNC: 104 MG/DL (ref 65–99)
MAGNESIUM SERPL-MCNC: 1.6 MG/DL (ref 1.5–2.5)
PHOSPHATE SERPL-MCNC: 3.1 MG/DL (ref 2.5–4.5)
POTASSIUM SERPL-SCNC: 3.5 MMOL/L (ref 3.6–5.5)
SODIUM SERPL-SCNC: 137 MMOL/L (ref 135–145)

## 2021-03-08 PROCEDURE — 84100 ASSAY OF PHOSPHORUS: CPT

## 2021-03-08 PROCEDURE — 83735 ASSAY OF MAGNESIUM: CPT

## 2021-03-08 PROCEDURE — A9270 NON-COVERED ITEM OR SERVICE: HCPCS | Performed by: GENERAL PRACTICE

## 2021-03-08 PROCEDURE — 700102 HCHG RX REV CODE 250 W/ 637 OVERRIDE(OP): Performed by: PSYCHIATRY & NEUROLOGY

## 2021-03-08 PROCEDURE — 80048 BASIC METABOLIC PNL TOTAL CA: CPT

## 2021-03-08 PROCEDURE — A9270 NON-COVERED ITEM OR SERVICE: HCPCS | Performed by: INTERNAL MEDICINE

## 2021-03-08 PROCEDURE — 99232 SBSQ HOSP IP/OBS MODERATE 35: CPT | Performed by: INTERNAL MEDICINE

## 2021-03-08 PROCEDURE — 82962 GLUCOSE BLOOD TEST: CPT

## 2021-03-08 PROCEDURE — 700102 HCHG RX REV CODE 250 W/ 637 OVERRIDE(OP): Performed by: INTERNAL MEDICINE

## 2021-03-08 PROCEDURE — A9270 NON-COVERED ITEM OR SERVICE: HCPCS | Performed by: PSYCHIATRY & NEUROLOGY

## 2021-03-08 PROCEDURE — 36415 COLL VENOUS BLD VENIPUNCTURE: CPT

## 2021-03-08 PROCEDURE — A9270 NON-COVERED ITEM OR SERVICE: HCPCS | Performed by: HOSPITALIST

## 2021-03-08 PROCEDURE — 302098 PASTE RING (FLAT): Performed by: INTERNAL MEDICINE

## 2021-03-08 PROCEDURE — 770004 HCHG ROOM/CARE - ONCOLOGY PRIVATE *

## 2021-03-08 PROCEDURE — 94760 N-INVAS EAR/PLS OXIMETRY 1: CPT

## 2021-03-08 PROCEDURE — 302102 BAG OST N IMG 2.25IN 2PC (FECAL): Performed by: INTERNAL MEDICINE

## 2021-03-08 PROCEDURE — 700102 HCHG RX REV CODE 250 W/ 637 OVERRIDE(OP): Performed by: GENERAL PRACTICE

## 2021-03-08 PROCEDURE — 700102 HCHG RX REV CODE 250 W/ 637 OVERRIDE(OP): Performed by: HOSPITALIST

## 2021-03-08 RX ORDER — POTASSIUM CHLORIDE 20 MEQ/1
40 TABLET, EXTENDED RELEASE ORAL ONCE
Status: COMPLETED | OUTPATIENT
Start: 2021-03-08 | End: 2021-03-08

## 2021-03-08 RX ADMIN — MIDODRINE HYDROCHLORIDE 5 MG: 5 TABLET ORAL at 12:43

## 2021-03-08 RX ADMIN — APIXABAN 5 MG: 5 TABLET, FILM COATED ORAL at 20:02

## 2021-03-08 RX ADMIN — APIXABAN 5 MG: 5 TABLET, FILM COATED ORAL at 09:12

## 2021-03-08 RX ADMIN — CLONAZEPAM 1 MG: 1 TABLET ORAL at 17:11

## 2021-03-08 RX ADMIN — DOCUSATE SODIUM 50 MG AND SENNOSIDES 8.6 MG 2 TABLET: 8.6; 5 TABLET, FILM COATED ORAL at 20:03

## 2021-03-08 RX ADMIN — Medication 400 MG: at 09:12

## 2021-03-08 RX ADMIN — ZIPRASIDONE HYDROCHLORIDE 60 MG: 60 CAPSULE ORAL at 20:03

## 2021-03-08 RX ADMIN — CLONAZEPAM 0.5 MG: 0.5 TABLET ORAL at 09:12

## 2021-03-08 RX ADMIN — ATORVASTATIN CALCIUM 40 MG: 40 TABLET, FILM COATED ORAL at 17:11

## 2021-03-08 RX ADMIN — PAROXETINE HYDROCHLORIDE 20 MG: 20 TABLET, FILM COATED ORAL at 09:12

## 2021-03-08 RX ADMIN — POTASSIUM CHLORIDE 40 MEQ: 1500 TABLET, EXTENDED RELEASE ORAL at 09:13

## 2021-03-08 RX ADMIN — DIGOXIN 125 MCG: 125 TABLET ORAL at 17:11

## 2021-03-08 RX ADMIN — MIDODRINE HYDROCHLORIDE 5 MG: 5 TABLET ORAL at 09:11

## 2021-03-08 RX ADMIN — METOPROLOL SUCCINATE 25 MG: 25 TABLET, EXTENDED RELEASE ORAL at 05:54

## 2021-03-08 RX ADMIN — LEVOTHYROXINE SODIUM 112 MCG: 0.11 TABLET ORAL at 05:54

## 2021-03-08 RX ADMIN — ZIPRASIDONE HYDROCHLORIDE 60 MG: 60 CAPSULE ORAL at 09:14

## 2021-03-08 ASSESSMENT — ENCOUNTER SYMPTOMS
SORE THROAT: 0
ABDOMINAL PAIN: 1
WEAKNESS: 0
COUGH: 0
VOMITING: 0
DEPRESSION: 1
FALLS: 0
HEADACHES: 0
DIARRHEA: 0
CHILLS: 0
BLURRED VISION: 0
CONSTIPATION: 0
NAUSEA: 1
DIZZINESS: 0
PALPITATIONS: 1
FEVER: 0
SHORTNESS OF BREATH: 0

## 2021-03-08 ASSESSMENT — PAIN DESCRIPTION - PAIN TYPE: TYPE: ACUTE PAIN

## 2021-03-08 NOTE — PROGRESS NOTES
Assumed care of pt @0700. Bedside report received. Pt AOX 4. Pt denies pain, nausea, and SOB. Legal hold,  1:1 sitter. Tele monitoring in place. PIV patent running IV fluids. Colostomy with watery output. Pt incontinent of urine. Q 2 hr turns. 2 p assist. Fall precaution in place. POC discussed with pt, all questions answered at this time. Pt makes needs known, call light within reach, hourly rounding in place.

## 2021-03-08 NOTE — CONSULTS
"PSYCHIATRIC FOLLOW-UP:(established)  *Reason for admission:   Syncope, hypotension. Being followed for SI.      *Legal Hold Status: on legal hold                *HPI: is worried because \"my heart is acting up again\" and fears he will have a heart attack and die. I wondered with him about how his SI fits into all of this.   He was curious about this too.          *Psychiatric Examination:   Vitals:   Vitals:    03/07/21 0446 03/07/21 0714 03/07/21 1215 03/07/21 1545   BP: 104/54 113/54 138/73 117/65   Pulse: 89 94 84 83   Resp: 18 18 16 18   Temp: 36.2 °C (97.2 °F) 36.3 °C (97.4 °F) 36.6 °C (97.9 °F) 36.6 °C (97.9 °F)   TempSrc: Temporal Temporal Temporal Temporal   SpO2: 95% 98% 96% 98%   Weight:       Height:       General Appearance:  good eye contact  Abnormal Movements: none   Gait and Posture: lying in bed  Speech: soft  Thought Process: normal rate  Associations:   linear  Abnormal or Psychotic Thoughts: none  Judgement and Insight: fair  Orientation: grossly intact  Recent and Remote Memory: grossly intact  Attention Span and Concentration: intact  Language:fluent  Fund of Knowledge: not tested  Mood and Affect: depressed and worried  SI/HI:  suicidal - yes and homicidal - no      *ASSESSMENT/RECOMENDATIONS:  1. Major depression recurrent with mood congruent psychosis     -increase paxil to 20 mg   -geodon 60 mg bid for AH  -klonopin 0.5 mg bid           2. Medical  -ostomy  -HTN  -pafib  -CHF  -DM2  -hypothyroidism  -orthostatic hypotension                Legal hold: extended  Observation status: 1:1 sitter  Privileges (while on legal hold):unchanged        *Will Follow     "

## 2021-03-08 NOTE — CARE PLAN
Problem: Safety  Goal: Will remain free from falls  Outcome: PROGRESSING AS EXPECTED  Note: A&O x4, slipper socks, bed locked and in low position, call light and personal belongings with reach, report given to CNA, appropriate signs outside door, hourly rounding in place.  1:1 sitter.      Problem: Skin Integrity  Goal: Risk for impaired skin integrity will decrease  Outcome: PROGRESSING AS EXPECTED  Intervention: Implement precautions to protect skin integrity in collaboration with the interdisciplinary team  Flowsheets  Taken 3/8/2021 1300 by Amina Michele R.N.  Patient Turns / Repositioning: Right Side  Assistance / Tolerance for Turning/Repositioning: Assistance of Two or More  Taken 3/8/2021 0910 by Amina Michele R.N.  Skin Preventative Measures: Pillows in Use for Support / Positioning  Bed Types: Pressure Redistribution Mattress (Atmosair)  Friction Interventions: Draw Sheet / Pad Used for Repositioning  Moisturizers/Barriers: Barrier Cream  Containment Devices:   Condom Cath   Fecal Pouch  Patient is Receiving Nutrition: Oral Intake Adequate  Vitamin Therapy in Use: No  Activity: Bed  Taken 3/7/2021 2000 by Charlee Davis R.N.  PT / OT Involved in Care:   Physical Therapy Involved   Occupational Therapy Involved  Note: Q2 hr turns, feet elevated, barrier cream in use, frequent linens change.

## 2021-03-08 NOTE — DISCHARGE PLANNING
"Anticipated Discharge Disposition: Inpatient psych facility.      Action: LSW completed chart review. SW inquired about possibility of ostomy reversal, however, per MD note: \"I did speak with [facility].  Reversal of ostomy is unlikely if it has not been 6 months.  Also needs further work-up such as a colonoscopy prior to reversal.\" LSW to update [facility] leadership who is also following this pt.      Barriers to Discharge: Ostomy, independent ostomy care, SI.      Plan: LSW will continue to assist in d/c planning.   "

## 2021-03-08 NOTE — PROGRESS NOTES
[facility] Medicine Daily Progress Note    Date of Service  3/8/2021    Chief Complaint  56 y.o. male admitted 2/9/2021 with   Chief Complaint   Patient presents with   • Syncope     X1 this morning when standing up to go to bathroom         Hospital Course  Mr. Sebastián Castro is a 56 y.o. male history of atrial fibrillation secondary to hypercoagulable state, diabetes complicated by diabetic neuropathy, congestive heart failure, hyperlipidemia, orthostatic hypotension on fludrocortisone who presented on 2/9/2021 with syncopal episode.  Patient reported dizziness prior to syncope.  On presentation to the emergency room she was hypertensive and bradycardic.  Cardiology recommended stopping all AV sanket blocking agents.  He was also started on midodrine for hypotension.    The patient was started on digoxin and apixaban for atrial fibrillation.  Echocardiogram showed EF of 60%, improved from previous echocardiogram obtained on 11/2020.    Of note patient has history of a spleen injury complicated by perforation of splenic flexure in September 2020.  Status post colostomy and Interiano's pouch in November 2020.  He was at home taking care of his colostomy prior to most recent admission.  However he has become depressed, overwhelmed, and can no longer take care of his colostomy independently.    Psych continues to follow the patient; psychiatric medication as per psych recommendation.      Interval Problem Update  Patient was seen and examined at bedside.  I have personally reviewed vitals, labs, and imaging.    3/2.  Afebrile.  Stable vitals.  On room air.  Denies fever, chills, chest pains, shortness of breath, dizziness, lightheadedness, palpitations.  Still reports depression and suicidal ideations.  He is now coming to  with having an ostomy.  3/3.  Afebrile.  Stable vitals.  On room air.  Replete mag, potassium.  Denies fever, chills, chest pains, shortness of breath.  Still reports some depression and low energy.   Reports some suicidal ideations.  Does not feel that he can take care of his ostomy by himself but cannot give a reason as to why not.  Did speak with general surgery Dr. Storm Gan who felt that reversible of his ostomy is not likely at this time.  3/4.  Afebrile.  Tachycardic this morning.  On room air.  Replete magnesium.  Hyponatremia is improved.  Patient has had nausea overnight and has not been able to keep down food and many medications.  He also had a bowel movement for the first time since his ostomy.  EKG this morning does show atrial fibrillation with a rate of 111.  He is on digoxin and apixaban.  Was previously on metoprolol and diltiazem for rate control but rate has been controlled without medications recently as well as he was having orthostatic hypotension and bradycardia.  Restart metoprolol.  Ordered for CT abdomen pelvis shows fluid distention in the colon.  QT is not prolonged.  We will continue to monitor nausea.  Started on IV fluids.  3/5.  Afebrile.  Remains in atrial fibrillation but rate controlled.  Hypotensive this morning.  Bump in kidney function this morning.  Replete magnesium, potassium.  Lactic acid within normal limits.  Patient has had poor oral intake and is still very nauseous.  Received fluid boluses overnight.  Will give 1 more bolus and ultrasound kidneys.  Patient denies dizziness, headedness, chest pains, shortness of breath.  He does report chronic palpitations attributed to his A. fib but nothing currently.  Still reports depression and low energy.  3/6.  Hypotension has resolved.  Episodes of tachycardia.  Afebrile.  On 1 L nasal cannula.  Hypervolemic hyponatremia.  Replete magnesium, potassium.  Kidney function is improved.  Denies fever, chills, chest pains, shortness of breath, dizziness, lightheadedness.  Still reports suicidal ideations.  Does report some nausea and abdominal pain.  Does not tolerate much food by mouth saying he is too nauseous.  He is  tolerating his medications.  Counseled him about his antinausea medications and encouraged p.o. intake  3/7.  Afebrile.  Episode of hypotension overnight.  On 1 L nasal cannula.  Replete mag, K-Phos.  Denies fever, chills, chest pains, shortness of breath, dizziness, lightheadedness.  Still nauseous with decreased p.o. intake.  Still reports depression.  3/8.  Afebrile.  Stable vitals.  Remains in atrial fibrillation, rate controlled.  On 1 L nasal cannula.  Appreciate recommendations from psychiatry.  Replete mag, KCl.  Denies fever, chills, chest pains, shortness of breath.  Nausea and abdominal pain are improved.  Tolerating some p.o. intake.    Consultants/Specialty  Psychiatry, cardiology    Code Status  Full Code    Disposition  To inpatient psychiatry    Review of Systems  Review of Systems   Constitutional: Negative for chills and fever.   HENT: Negative for congestion and sore throat.    Eyes: Negative for blurred vision.   Respiratory: Negative for cough and shortness of breath.    Cardiovascular: Positive for palpitations and leg swelling. Negative for chest pain.   Gastrointestinal: Positive for abdominal pain and nausea. Negative for constipation, diarrhea and vomiting.   Genitourinary: Negative for dysuria, frequency and urgency.   Musculoskeletal: Negative for falls.   Skin: Negative for rash.   Neurological: Negative for dizziness, weakness and headaches.   Psychiatric/Behavioral: Positive for depression and suicidal ideas.   All other systems reviewed and are negative.       Physical Exam  Temp:  [36.1 °C (96.9 °F)-36.7 °C (98 °F)] 36.1 °C (96.9 °F)  Pulse:  [80-99] 81  Resp:  [16-18] 18  BP: (110-138)/(59-73) 112/65  SpO2:  [94 %-98 %] 96 %    Physical Exam  Vitals and nursing note reviewed.   Constitutional:       General: He is not in acute distress.     Appearance: Normal appearance. He is obese.   HENT:      Head: Normocephalic and atraumatic.      Right Ear: External ear normal.      Left Ear:  External ear normal.      Nose: Nose normal.      Mouth/Throat:      Mouth: Mucous membranes are moist.      Pharynx: Oropharynx is clear.   Eyes:      Extraocular Movements: Extraocular movements intact.      Conjunctiva/sclera: Conjunctivae normal.   Cardiovascular:      Rate and Rhythm: Normal rate and regular rhythm.      Pulses: Normal pulses.      Heart sounds: Normal heart sounds.   Pulmonary:      Effort: Pulmonary effort is normal. No respiratory distress.      Breath sounds: Normal breath sounds. No wheezing.   Abdominal:      General: Abdomen is flat. Bowel sounds are normal. There is no distension.      Palpations: Abdomen is soft.      Tenderness: There is abdominal tenderness.      Comments: Does have ostomy in place  Also noted to have wound  With granulation tissue    Musculoskeletal:         General: Normal range of motion.      Cervical back: Normal range of motion.      Right lower leg: Edema present.      Left lower leg: Edema present.      Comments: Varicose veins and swelling of lower extremities   Skin:     General: Skin is warm.   Neurological:      General: No focal deficit present.      Mental Status: He is alert and oriented to person, place, and time. Mental status is at baseline.      Cranial Nerves: No cranial nerve deficit.      Motor: No weakness.   Psychiatric:         Behavior: Behavior normal.      Comments: Depressed mood         Fluids    Intake/Output Summary (Last 24 hours) at 3/8/2021 0813  Last data filed at 3/8/2021 0151  Gross per 24 hour   Intake --   Output 1900 ml   Net -1900 ml       Laboratory  Recent Labs     03/06/21  0012 03/07/21  0009   WBC 8.8 9.6   RBC 5.63 5.48   HEMOGLOBIN 16.0 15.7   HEMATOCRIT 48.8 47.2   MCV 86.7 86.1   MCH 28.4 28.6   MCHC 32.8* 33.3*   RDW 44.8 44.4   PLATELETCT 214 216   MPV 11.0 11.0     Recent Labs     03/06/21  0012 03/07/21  0009 03/08/21  0529   SODIUM 131* 130* 137   POTASSIUM 3.8 3.8 3.5*   CHLORIDE 99 100 107   CO2 19* 19* 21    GLUCOSE 111* 113* 104*   BUN 29* 29* 23*   CREATININE 0.99 0.96 0.81   CALCIUM 9.0 9.0 8.7                   Imaging  US-RENAL   Final Result      No evidence of hydronephrosis.      Lobulated kidneys bilaterally.      CT-ABDOMEN-PELVIS WITH   Final Result      1.  There is no evidence of small bowel obstruction.   2.  There is a left lower quadrant ostomy.   3.  There is fluid distention of the colon distal to the surgical material in the left mid descending colon extending all the way to the rectum. There is no pneumatosis or free air.   4.  There is cholelithiasis without biliary dilatation.   5.  There has been interval removal of the drainage catheter anterior to the spleen with minimal hypodense area in the anterior spleen again noted. There is no new fluid collection.      IR-US GUIDED PIV   Final Result    Ultrasound-guided PERIPHERAL IV INSERTION performed by    qualified nursing staff as above.      EC-ECHOCARDIOGRAM COMPLETE W/O CONT   Final Result      DX-LUMBAR SPINE-2 OR 3 VIEWS   Final Result      Moderate compression deformity of T11 is new compared to 2018.      Mild wedge deformity of T12 is unchanged.      Degenerative changes including facet arthropathy.      Mild retrolisthesis of L5 on S1 and L3 on L4.              Assessment/Plan  * Suicidal ideation  Assessment & Plan  Has history of MDD, recurrent with psychotic features with active SI w/ plan.   --Psych following, continue cycling medication as per psych recommendation.    Major depressive disorder, recurrent, severe with psychotic features (HCC)- (present on admission)  Assessment & Plan  -Psychiatry and Psychology following   -Psych medication as per psych recommendation    Colostomy present (HCC)- (present on admission)  Assessment & Plan  History of fall at home Nov 2020 on left side with perforated colon and splenic fluid collection/hematoma s/p segmental colectomy, colostomy, mobilization of the splenic flexure, wound vac, and I&D  Nov 2020.   -Continue to provide education for colostomy care.  Patient must be independently able to care for his ostomy before inpatient psychiatry will accept him  I did speak with [facility].  Reversal of ostomy is unlikely if it has not been 6 months.  Also needs further work-up such as a colonoscopy prior to reversal.    Orthostatic hypotension- (present on admission)  Assessment & Plan  Multifactorial including medications (beta blocker, CCB, tamsulosin), diabetic autonomic dysfunction, and venous insufficiency   --EF is noted to be 60%, significant improvement from prior.   --Per cardiology, no further cardiac work-up needed  -Patient need to follow-up with cardiology as an outpatient.    Had to increase midodrine again and give fluid boluses.  Questionable history of adrenal insufficiency on Florinef on admission.  Patient is unaware this and does not know what Florinef is.  Random cortisol and stim test not consistent with adrenal insufficiency  Patient has had poor oral intake secondary to nausea.  Kidneys appear normal on imaging.  Monitor kidney function and urine output    Syncope  Assessment & Plan  Likely secondary to orthostatic hypotension  Echo normal with 60% ejection fraction, improved from prior    Paroxysmal atrial fibrillation ([facility])  Assessment & Plan  CHADSVASC 3 (HTN, CHF, DM). Rates 60s-70s w/out beta blockade. TTE Feb 2021: EF 60%,   -Continue digoxin 0.125 mg p.o. daily, Eliquis 5 mg p.o. twice daily  Restart metoprolol XL for rate control    Chronic HFrEF (heart failure with reduced ejection fraction) ([facility])  Assessment & Plan  EF 60% on repeat echo.  Patient is currently euvolemic.    I/Os, daily weight and fluid restriction to 1.5 L  Continue metoprolol XL  Getting fluid boluses for hypotension as above.  Continue to monitor fluid status    Diabetes mellitus type 2, insulin dependent ([facility])  Assessment & Plan  Lab Results   Component Value Date/Time    HBA1C 5.2 02/14/2021  0130    HBA1C 7.8 (H) 11/10/2020 0331    HBA1C 7.7 (H) 10/22/2019 0823     Results from last 7 days   Lab Units 03/08/21  0916 03/07/21  1720 03/07/21  1311 03/07/21  0806 03/06/21 2012 03/06/21  1729 03/06/21  1256 03/06/21  0855   ACCU CHECK GLUCOSE 788 mg/dL 111* 111* 97 108* 111* 125* 99 111*     I have ordered insulin sliding scale with D50 and glucagon for hypoglycemia per protocol.  Diabetic diet  Diabetic education    Hypothyroidism- (present on admission)  Assessment & Plan  TSH wnl Feb 2021  -continue home Levothyroxine    Essential hypertension- (present on admission)  Assessment & Plan  Blood pressure well controlled, monitor.    Recently hypotensive  Midodrine as needed    Lower limb amputation, great toe (HCC)- (present on admission)  Assessment & Plan  s/p right great toe amputation 2018    Elevated troponin  Assessment & Plan  RESOLVED  Stable. Suspect due to demand ischemia. No need to trend further.    Debility- (present on admission)  Assessment & Plan  -PT/OT evaluated and appreciated recs    Chronic venous insufficiency of lower extremity  Assessment & Plan  -elevated legs  -compression stockings  -lotion, soap/water to legs  -weight loss (BMI 38)    Nonischemic cardiomyopathy (HCC)- (present on admission)  Assessment & Plan  Normal coronaries, 2018.Tachycardia mediated  -LVEF improved, based on echo 2/10/2021 LVEF 60%    Mixed hyperlipidemia- (present on admission)  Assessment & Plan  LDL 82 Feb 2021  -repeat lipid panel  -continue Atorvatatin    Obesity- (present on admission)  Assessment & Plan  Body mass index is 37.79 kg/m².  Counseled about diet and exercise       VTE prophylaxis:  eliquis

## 2021-03-09 PROBLEM — I95.1 SYNCOPE DUE TO ORTHOSTATIC HYPOTENSION: Status: ACTIVE | Noted: 2021-02-15

## 2021-03-09 PROBLEM — E27.49 SECONDARY ADRENAL INSUFFICIENCY (HCC): Status: ACTIVE | Noted: 2020-11-10

## 2021-03-09 LAB
ANION GAP SERPL CALC-SCNC: 9 MMOL/L (ref 7–16)
BUN SERPL-MCNC: 22 MG/DL (ref 8–22)
CALCIUM SERPL-MCNC: 8.7 MG/DL (ref 8.5–10.5)
CHLORIDE SERPL-SCNC: 107 MMOL/L (ref 96–112)
CO2 SERPL-SCNC: 21 MMOL/L (ref 20–33)
CREAT SERPL-MCNC: 0.77 MG/DL (ref 0.5–1.4)
GLUCOSE SERPL-MCNC: 117 MG/DL (ref 65–99)
MAGNESIUM SERPL-MCNC: 1.6 MG/DL (ref 1.5–2.5)
PHOSPHATE SERPL-MCNC: 2.8 MG/DL (ref 2.5–4.5)
POTASSIUM SERPL-SCNC: 3.6 MMOL/L (ref 3.6–5.5)
SODIUM SERPL-SCNC: 137 MMOL/L (ref 135–145)

## 2021-03-09 PROCEDURE — A9270 NON-COVERED ITEM OR SERVICE: HCPCS | Performed by: HOSPITALIST

## 2021-03-09 PROCEDURE — A9270 NON-COVERED ITEM OR SERVICE: HCPCS | Performed by: INTERNAL MEDICINE

## 2021-03-09 PROCEDURE — 83735 ASSAY OF MAGNESIUM: CPT

## 2021-03-09 PROCEDURE — 770004 HCHG ROOM/CARE - ONCOLOGY PRIVATE *

## 2021-03-09 PROCEDURE — 99233 SBSQ HOSP IP/OBS HIGH 50: CPT | Performed by: STUDENT IN AN ORGANIZED HEALTH CARE EDUCATION/TRAINING PROGRAM

## 2021-03-09 PROCEDURE — 700102 HCHG RX REV CODE 250 W/ 637 OVERRIDE(OP): Performed by: PSYCHIATRY & NEUROLOGY

## 2021-03-09 PROCEDURE — A9270 NON-COVERED ITEM OR SERVICE: HCPCS | Performed by: PSYCHIATRY & NEUROLOGY

## 2021-03-09 PROCEDURE — 700102 HCHG RX REV CODE 250 W/ 637 OVERRIDE(OP): Performed by: INTERNAL MEDICINE

## 2021-03-09 PROCEDURE — 700105 HCHG RX REV CODE 258: Performed by: INTERNAL MEDICINE

## 2021-03-09 PROCEDURE — 84100 ASSAY OF PHOSPHORUS: CPT

## 2021-03-09 PROCEDURE — 700102 HCHG RX REV CODE 250 W/ 637 OVERRIDE(OP): Performed by: GENERAL PRACTICE

## 2021-03-09 PROCEDURE — 80048 BASIC METABOLIC PNL TOTAL CA: CPT

## 2021-03-09 PROCEDURE — 36415 COLL VENOUS BLD VENIPUNCTURE: CPT

## 2021-03-09 PROCEDURE — 700102 HCHG RX REV CODE 250 W/ 637 OVERRIDE(OP): Performed by: HOSPITALIST

## 2021-03-09 PROCEDURE — A9270 NON-COVERED ITEM OR SERVICE: HCPCS | Performed by: GENERAL PRACTICE

## 2021-03-09 RX ADMIN — ZIPRASIDONE HYDROCHLORIDE 60 MG: 60 CAPSULE ORAL at 19:58

## 2021-03-09 RX ADMIN — ATORVASTATIN CALCIUM 40 MG: 40 TABLET, FILM COATED ORAL at 18:00

## 2021-03-09 RX ADMIN — METOPROLOL SUCCINATE 25 MG: 25 TABLET, EXTENDED RELEASE ORAL at 05:27

## 2021-03-09 RX ADMIN — SODIUM CHLORIDE: 9 INJECTION, SOLUTION INTRAVENOUS at 07:47

## 2021-03-09 RX ADMIN — ZIPRASIDONE HYDROCHLORIDE 60 MG: 60 CAPSULE ORAL at 09:05

## 2021-03-09 RX ADMIN — CLONAZEPAM 0.5 MG: 0.5 TABLET ORAL at 07:48

## 2021-03-09 RX ADMIN — DOCUSATE SODIUM 50 MG AND SENNOSIDES 8.6 MG 2 TABLET: 8.6; 5 TABLET, FILM COATED ORAL at 07:48

## 2021-03-09 RX ADMIN — CLONAZEPAM 1 MG: 1 TABLET ORAL at 18:00

## 2021-03-09 RX ADMIN — LEVOTHYROXINE SODIUM 112 MCG: 0.11 TABLET ORAL at 05:27

## 2021-03-09 RX ADMIN — DIGOXIN 125 MCG: 125 TABLET ORAL at 18:00

## 2021-03-09 RX ADMIN — DOCUSATE SODIUM 50 MG AND SENNOSIDES 8.6 MG 2 TABLET: 8.6; 5 TABLET, FILM COATED ORAL at 19:58

## 2021-03-09 RX ADMIN — APIXABAN 5 MG: 5 TABLET, FILM COATED ORAL at 19:58

## 2021-03-09 RX ADMIN — APIXABAN 5 MG: 5 TABLET, FILM COATED ORAL at 07:47

## 2021-03-09 RX ADMIN — PAROXETINE HYDROCHLORIDE 20 MG: 20 TABLET, FILM COATED ORAL at 07:48

## 2021-03-09 ASSESSMENT — ENCOUNTER SYMPTOMS
DEPRESSION: 1
COUGH: 0
EYE PAIN: 0
NAUSEA: 0
FEVER: 0
DIARRHEA: 0
CHILLS: 0
NECK PAIN: 0
SHORTNESS OF BREATH: 0
BACK PAIN: 0
SORE THROAT: 0
HEADACHES: 0
BRUISES/BLEEDS EASILY: 0
FLANK PAIN: 0
CONSTIPATION: 0
ABDOMINAL PAIN: 0
SINUS PAIN: 0
VOMITING: 0
MYALGIAS: 0

## 2021-03-09 ASSESSMENT — PAIN DESCRIPTION - PAIN TYPE: TYPE: ACUTE PAIN

## 2021-03-09 NOTE — CARE PLAN
Problem: Communication  Goal: The ability to communicate needs accurately and effectively will improve  Outcome: PROGRESSING AS EXPECTED   Plan of care discussed with patient, questions and concerns addressed      Problem: Safety  Goal: Will remain free from injury  Outcome: PROGRESSING AS EXPECTED   Fall precautions in place, patient calling appropriately

## 2021-03-09 NOTE — WOUND TEAM
[facility]   Inpatient Services   Established Ostomy Management/ troubleshooting  HPI: Reviewed  PMH: Reviewed   SH: Reviewed   Reason for Ostomy nurse consult:  Established colostomy    Subjective: \"I'm all butter fingers\"  Objective: appliance intact, sitter at bedside, pt on legal hold    Colostomy 11/10/20 Transverse LUQ (Active)   Wound Image   02/24/21 0900   Stomal Appliance Assessment Changed    Stoma Assessment Red    Stoma Shape Budded Less Than One Inch;Oval    Stoma Size (in) 1.25    Peristomal Assessment Clean;Dry;Intact    Mucocutaneous Junction Intact    Treatment Appliance Changed;Site care    Peristomal Protectant No Sting Skin Prep    Stomal Appliance Paste Ring, 2\", forma flex barrier 2 1/4\" with pouch    Output (mL) 300 mL    Output Color Brown    WOUND RN ONLY - Stomal Appliance  2 Piece;Paste Ring, 2\";2 1/4\" Moldable    Appliance (Pouch) # [id number]    Appliance Brand [employer]                  Ostomy Appliance (type and size): 2 1/4\"  formaflex barrier with pouch and Paste Ring,      Interventions and Education: Assisted patient to empty pouch and remove appliance. Stoma and peristomal skin cleansed with moist washcloth. Demonstrated to patient how to useFormaflex barrier  And stretched to size, paste ring applied to vj stomal skin and then barrier adhered to skin. Assisted patient to attach pouch and close end.     Evaluation:     3/8: Minimal participation, he attempts to do the steps with his eyes closed and minimal effort with his hands. Not sure if he lacks the fine motor movement or just going through the motions due to his expressed hatred of the colostomy. Patient has yet to perform all steps independently.    3/5: Pt did not participate in education today. Pt answered questions appropriately, but remained with his eyes closed through the duration of ostomy care. Declined hands on education when offered. Reported he was not feeling good. Formaflex barrier brought in today " for pt as these do no require cutting with scissors. Extra barrier left in bag. CT of abd obtained yesterday did not reveal obstruction.    3/1: Viable stoma, producing stool. Per CNA report at bedside, appliance was leaking earlier and CNA changed appliance. Patient observed all steps today and participated with hands on care and did well. Discussed with MD on possibility for reversal. Per surgery, reversal of ostomy is an outpatient procedure that he will have to follow-up with in the future after discharge    2/26: Pt very down today, reports that psych has not seen him. Pt kept turning head away from ostomy today but did verbalized he will try to do hands on Sunday.     2/24/21: Pt unable to transfer to inpatient psych until he can care for colostomy independently. Ostomy RNs were asked to return for education. Pt extremely depressed today. Pt states he feels like an animal and that the colostomy is messy and smelly and he would rather die then care for it. This RN provided understanding and attempted to explain that colostomy is manageable. 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 Pt had colostomy created on November 10, 2020 by Dr. Desouza. May want to consider contacting surgery for possible reversal related to pts extreme body dysmorphia (this is pts 2nd admit for SI related to colostomy) and inability for pt to DC due to pts inability/unwillingness to care for colostomy - Discussed with Dr. Lye ([facility] Resident).           Plan: Wound team to continue with teaching every 3-4 days to help teach patient how to care for appliance so that he can discharge to psych.    Anticipated discharge needs: patient would benefit from SNF, outpatient clinic or Home health for follow up care. Patient needs to follow up with  Dr. Desouza after discharge to plan for ostomy reversal surgery.

## 2021-03-09 NOTE — CONSULTS
Brief Behavioral Health Note:    Received report that patient continues to decline participation in his own ostomy care as indicated by the the wound care RN note dated 3/8/2021. Note indicates patient minimally participated in ostomy care, closed his eyes the majority of the time and demonstrated minimal effort with his hands.    In an effort to gain more understanding of patient's history prior to admission, [facility] director, Caitlin Dang was contacted and confirmed that patient was a resident at [facility]'s group home, which requires complete independent living. Patient was required to ambulate independently, maintain his own activities of daily living including ostomy changes. Caitlin confirmed that patient did in fact maintain himself while at group home.     Requested if the [facility]  would be willing to come to the hospital to evaluate patient and offer recommendations on how to engage patient in his own care. Awaiting a return call.    Susan Ewing, Ph.D., [facility]

## 2021-03-09 NOTE — DISCHARGE PLANNING
Legal Hold     Referral: Legal Hold Court     Intervention: Pt presented for legal hold meeting with  via video conferencing to discuss legal options of contesting hold and meeting with the court doctors tomorrow afternoon, or not contesting legal hold and continuing the hold for one week.  advised that pt's legal hold will be be continued for one week (3/18).       Plan: Pt's legal hold has been continued for one week. Pt awaiting transfer to an in patient psych facility.

## 2021-03-09 NOTE — CARE PLAN
Problem: Communication  Goal: The ability to communicate needs accurately and effectively will improve  Outcome: PROGRESSING AS EXPECTED  Note: Pt able to voice needs when prompted; frequent rounding in place to assess needs.     Problem: Safety  Goal: Will remain free from injury  Outcome: PROGRESSING AS EXPECTED  Note: 1-1 sitter in place; pt on legal hold.

## 2021-03-10 PROBLEM — E87.20 NORMAL ANION GAP METABOLIC ACIDOSIS: Status: ACTIVE | Noted: 2021-03-10

## 2021-03-10 LAB
ANION GAP SERPL CALC-SCNC: 10 MMOL/L (ref 7–16)
BUN SERPL-MCNC: 19 MG/DL (ref 8–22)
CALCIUM SERPL-MCNC: 8.4 MG/DL (ref 8.5–10.5)
CHLORIDE SERPL-SCNC: 104 MMOL/L (ref 96–112)
CO2 SERPL-SCNC: 22 MMOL/L (ref 20–33)
CREAT SERPL-MCNC: 0.87 MG/DL (ref 0.5–1.4)
GLUCOSE SERPL-MCNC: 126 MG/DL (ref 65–99)
POTASSIUM SERPL-SCNC: 3.4 MMOL/L (ref 3.6–5.5)
SODIUM SERPL-SCNC: 136 MMOL/L (ref 135–145)

## 2021-03-10 PROCEDURE — 80048 BASIC METABOLIC PNL TOTAL CA: CPT

## 2021-03-10 PROCEDURE — 700102 HCHG RX REV CODE 250 W/ 637 OVERRIDE(OP): Performed by: PSYCHIATRY & NEUROLOGY

## 2021-03-10 PROCEDURE — 700102 HCHG RX REV CODE 250 W/ 637 OVERRIDE(OP): Performed by: STUDENT IN AN ORGANIZED HEALTH CARE EDUCATION/TRAINING PROGRAM

## 2021-03-10 PROCEDURE — A9270 NON-COVERED ITEM OR SERVICE: HCPCS | Performed by: PSYCHIATRY & NEUROLOGY

## 2021-03-10 PROCEDURE — A9270 NON-COVERED ITEM OR SERVICE: HCPCS | Performed by: INTERNAL MEDICINE

## 2021-03-10 PROCEDURE — A9270 NON-COVERED ITEM OR SERVICE: HCPCS | Performed by: GENERAL PRACTICE

## 2021-03-10 PROCEDURE — A9270 NON-COVERED ITEM OR SERVICE: HCPCS | Performed by: STUDENT IN AN ORGANIZED HEALTH CARE EDUCATION/TRAINING PROGRAM

## 2021-03-10 PROCEDURE — 700102 HCHG RX REV CODE 250 W/ 637 OVERRIDE(OP): Performed by: INTERNAL MEDICINE

## 2021-03-10 PROCEDURE — 770004 HCHG ROOM/CARE - ONCOLOGY PRIVATE *

## 2021-03-10 PROCEDURE — A9270 NON-COVERED ITEM OR SERVICE: HCPCS | Performed by: HOSPITALIST

## 2021-03-10 PROCEDURE — 99232 SBSQ HOSP IP/OBS MODERATE 35: CPT | Performed by: STUDENT IN AN ORGANIZED HEALTH CARE EDUCATION/TRAINING PROGRAM

## 2021-03-10 PROCEDURE — 700102 HCHG RX REV CODE 250 W/ 637 OVERRIDE(OP): Performed by: HOSPITALIST

## 2021-03-10 PROCEDURE — 36415 COLL VENOUS BLD VENIPUNCTURE: CPT

## 2021-03-10 PROCEDURE — 90837 PSYTX W PT 60 MINUTES: CPT | Performed by: SOCIAL WORKER

## 2021-03-10 PROCEDURE — 700102 HCHG RX REV CODE 250 W/ 637 OVERRIDE(OP): Performed by: GENERAL PRACTICE

## 2021-03-10 RX ADMIN — LEVOTHYROXINE SODIUM 112 MCG: 0.11 TABLET ORAL at 05:17

## 2021-03-10 RX ADMIN — PAROXETINE HYDROCHLORIDE 20 MG: 20 TABLET, FILM COATED ORAL at 09:01

## 2021-03-10 RX ADMIN — CLONAZEPAM 0.5 MG: 0.5 TABLET ORAL at 09:01

## 2021-03-10 RX ADMIN — METOPROLOL SUCCINATE 25 MG: 25 TABLET, EXTENDED RELEASE ORAL at 05:18

## 2021-03-10 RX ADMIN — ATORVASTATIN CALCIUM 40 MG: 40 TABLET, FILM COATED ORAL at 16:56

## 2021-03-10 RX ADMIN — ACETAMINOPHEN 650 MG: 325 TABLET, FILM COATED ORAL at 13:51

## 2021-03-10 RX ADMIN — MIDODRINE HYDROCHLORIDE 5 MG: 5 TABLET ORAL at 12:23

## 2021-03-10 RX ADMIN — APIXABAN 5 MG: 5 TABLET, FILM COATED ORAL at 09:01

## 2021-03-10 RX ADMIN — ZIPRASIDONE HYDROCHLORIDE 60 MG: 60 CAPSULE ORAL at 20:04

## 2021-03-10 RX ADMIN — MIDODRINE HYDROCHLORIDE 5 MG: 5 TABLET ORAL at 16:56

## 2021-03-10 RX ADMIN — MIDODRINE HYDROCHLORIDE 5 MG: 5 TABLET ORAL at 09:01

## 2021-03-10 RX ADMIN — DOCUSATE SODIUM 50 MG AND SENNOSIDES 8.6 MG 2 TABLET: 8.6; 5 TABLET, FILM COATED ORAL at 09:00

## 2021-03-10 RX ADMIN — CLONAZEPAM 1 MG: 1 TABLET ORAL at 16:56

## 2021-03-10 RX ADMIN — DIGOXIN 125 MCG: 125 TABLET ORAL at 16:58

## 2021-03-10 RX ADMIN — APIXABAN 5 MG: 5 TABLET, FILM COATED ORAL at 20:04

## 2021-03-10 RX ADMIN — ZIPRASIDONE HYDROCHLORIDE 60 MG: 60 CAPSULE ORAL at 12:22

## 2021-03-10 ASSESSMENT — ENCOUNTER SYMPTOMS
SORE THROAT: 0
DEPRESSION: 0
NAUSEA: 0
DIARRHEA: 0
SINUS PAIN: 0
FEVER: 0
CONSTIPATION: 0
NECK PAIN: 0
BLOOD IN STOOL: 0
EYE PAIN: 0
BACK PAIN: 0
VOMITING: 0
ABDOMINAL PAIN: 0
SHORTNESS OF BREATH: 0
COUGH: 0
FLANK PAIN: 0
HEADACHES: 0
MYALGIAS: 0
CHILLS: 0
BRUISES/BLEEDS EASILY: 0

## 2021-03-10 ASSESSMENT — PAIN DESCRIPTION - PAIN TYPE: TYPE: ACUTE PAIN

## 2021-03-10 NOTE — PROGRESS NOTES
0650- Bedside report complete with NOC RN. Patient resting in bed. Sitter at bedside. Checklist in use.     0800-

## 2021-03-10 NOTE — PROGRESS NOTES
1300- Patient did all of own ostomy care. Took down, cleaned, prepped, applied and checked device for completion. He was happy he did it and reported to the sitter he was able to do it.

## 2021-03-10 NOTE — CONSULTS
"Behavioral Health Brief Note     HPI: Sebastián Castro is a 56 year old male with a history of atrial fibrillation secondary to hypercoagulable state, diabetes complication, neuropathy,, congestive heart failure, hypotension, presented on 2/9/2021 with a syncopal episode.     Summary of visit: Patient was seen seated on chair, alert, oriented and pleasant. Patient's mood seemed improved and patient appeared more cooperative. Patient states, \"I tried to change my colostomy bag!\" I began to congratulate patient however patient interrupted the congratulations by stateing, \"wait, wait, don't celebrate too early , I was not that good at it, and I still have to change it about three more times before I can leave here\". Again, reminded patient that he has been previously independent. The goals is for patient to return to his previous level of functioning. Patient agreed, responding, \"it just takes time\".    Patient reports he is still experiencing \"mild suicidal ideations\". Patient asserts, \"I still think I need to go to the psychiatric hospital\". Patient did not discuss auditory or visual hallucinations. Patient reports that he has noticed his mood has improved overall. He attributes his medication, a phone call from his brother and music.    Received call from Stacey, manager for [facility] [phone]. [facility] reports that patient was in the process of being transferred from their short term group home to a long term group home placement called the [facility]\" group home run by Mat[facility], [phone]. Patient also reports his payee is Karol [phone].    Assessment:  Patient continues to experience secondary gain for remaining helpless and minimally cooperative regarding his self care. The more patient is expected to manage his own care based upon his true capabilities, his confidence will grow. I continue to encourage all staff to provide stand by assist, with verbal cues as needed for colostomy care, " maintaining an expectation towards independence.     Recommendations:  1. Continued one on one observation  2. Continued allowing patient to manage his own ostomy care  3. Transfer to a psychiatric hospital once a bed becomes available.  4. Legal hold remains in place  5. Phone access, visitors approved    Susan Ewing, Ph.D., [facility]

## 2021-03-10 NOTE — ASSESSMENT & PLAN NOTE
Resolved  EKG and Troponins not consistent with ACS   Telephone Encounter by Livia Bell at 10/04/17 09:15 AM     Author:  Livia Bell Service:  (none) Author Type:       Filed:  10/04/17 09:15 AM Encounter Date:  9/20/2017 Status:  Signed     :  Livia Bell ()            Left message on answering machine to call back.  (2nd attempt)[BH1.1T]        Revision History        User Key Date/Time User Provider Type Action    > BH1.1 10/04/17 09:15 AM Livia Bell  Sign    T - Template

## 2021-03-10 NOTE — CARE PLAN
Problem: Safety  Goal: Will remain free from injury  Outcome: PROGRESSING AS EXPECTED  Note: Pt on legal hold. 1-1 sitter in place. Frequent rounding in place.     Problem: Communication  Goal: The ability to communicate needs accurately and effectively will improve  Outcome: PROGRESSING SLOWER THAN EXPECTED  Note: Pt has not verbalized any needs during this shift. Frequent monitoring in place to assess needs.

## 2021-03-10 NOTE — PROGRESS NOTES
[facility] Medicine Daily Progress Note    Date of Service  3/10/2021    Chief Complaint  56 y.o. male admitted 2/9/2021 with   Chief Complaint   Patient presents with   • Syncope     X1 this morning when standing up to go to bathroom         Hospital Course  Mr. Sebastián Castro is a 56 y.o. male history of atrial fibrillation secondary to hypercoagulable state, diabetes complicated by diabetic neuropathy, congestive heart failure, hyperlipidemia, orthostatic hypotension on fludrocortisone who presented on 2/9/2021 with syncopal episode.  Patient reported dizziness prior to syncope.  On presentation to the emergency room she was hypertensive and bradycardic.  Cardiology recommended stopping all AV sanket blocking agents.  He was also started on midodrine for hypotension.    The patient was started on digoxin and apixaban for atrial fibrillation.  Echocardiogram showed EF of 60%, improved from previous echocardiogram obtained on 11/2020.    Of note patient has history of a spleen injury complicated by perforation of splenic flexure in September 2020.  Status post colostomy and Interiano's pouch in November 2020.  He was at home taking care of his colostomy prior to most recent admission.  However he has become depressed, overwhelmed, and can no longer take care of his colostomy independently.    Psych continues to follow the patient; psychiatric medication as per psych recommendation.      Interval Problem Update  RADHA ON  He is in great spirits today.  He received a call from his brother, which he enjoyed.  He participated in ostomy bag change with his RN, Jenise.   He did not find it too unpleasant and believes he will be able to manage it himself with a few more sessions of supervised practice.  Denies pain, dyspnea, F/C, bleeding, N/V, bowel/bladder dysfunction.    Visited by [facility] representative. In consideration for discharge to their facility on legal hold if able to manage his ostomy on his  own.    Consultants/Specialty  Psychiatry - SI  Cardiology - Hypotension and Atrial Fibrillation    Code Status  Full Code    Disposition  To inpatient psychiatry pending improvement in self-care of ostomy (must be independent for placement)    Review of Systems  Review of Systems   Constitutional: Negative for chills and fever.   HENT: Negative for ear pain, nosebleeds, sinus pain and sore throat.    Eyes: Negative for pain.   Respiratory: Negative for cough and shortness of breath.    Cardiovascular: Positive for leg swelling. Negative for chest pain.   Gastrointestinal: Negative for abdominal pain, blood in stool, constipation, diarrhea, melena, nausea and vomiting.   Genitourinary: Negative for dysuria, flank pain and hematuria.   Musculoskeletal: Negative for back pain, joint pain, myalgias and neck pain.   Skin: Negative for rash.   Neurological: Negative for headaches.   Endo/Heme/Allergies: Does not bruise/bleed easily.   Psychiatric/Behavioral: Negative for depression.   All other systems reviewed and are negative.       Physical Exam  Temp:  [36.6 °C (97.9 °F)-36.8 °C (98.3 °F)] 36.6 °C (97.9 °F)  Pulse:  [72-74] 72  Resp:  [18] 18  BP: (114-138)/(63-72) 114/72  SpO2:  [90 %-96 %] 90 %    Physical Exam  Vitals and nursing note reviewed.   Constitutional:       General: He is not in acute distress.     Appearance: He is obese. He is not ill-appearing.   HENT:      Head: Normocephalic.      Nose: Nose normal.      Mouth/Throat:      Mouth: Mucous membranes are moist.      Pharynx: Oropharynx is clear.   Eyes:      General: No scleral icterus.     Conjunctiva/sclera: Conjunctivae normal.   Cardiovascular:      Rate and Rhythm: Normal rate and regular rhythm.      Pulses: Normal pulses.      Heart sounds: Normal heart sounds. No murmur. No friction rub. No gallop.    Pulmonary:      Effort: Pulmonary effort is normal. No respiratory distress.      Breath sounds: Normal breath sounds. No wheezing, rhonchi or  rales.   Abdominal:      General: Abdomen is flat. Bowel sounds are normal. There is no distension.      Palpations: Abdomen is soft.      Tenderness: There is no abdominal tenderness. There is no guarding or rebound.   Genitourinary:     Comments: Ostomy site dressed, nonerythematous, nontender.  Ostomy output soft, brown.  Musculoskeletal:         General: Normal range of motion.      Cervical back: Neck supple.      Right lower leg: Edema present.      Left lower leg: Edema present.   Skin:     General: Skin is warm.      Comments: BLE chronic stasis dermatitis   Neurological:      Mental Status: He is alert.      Cranial Nerves: No cranial nerve deficit.      Motor: No weakness.      Comments: Appropriately conversant   Psychiatric:         Attention and Perception: Attention and perception normal.         Mood and Affect: Mood and affect normal.         Speech: Speech normal.         Behavior: Behavior normal. Behavior is cooperative.         Thought Content: Thought content does not include suicidal ideation.         Cognition and Memory: Cognition and memory normal.         Judgment: Judgment normal.         Fluids    Intake/Output Summary (Last 24 hours) at 3/10/2021 1434  Last data filed at 3/10/2021 1300  Gross per 24 hour   Intake 50 ml   Output 700 ml   Net -650 ml       Laboratory      Recent Labs     03/08/21  0529 03/09/21  0001 03/10/21  0008   SODIUM 137 137 136   POTASSIUM 3.5* 3.6 3.4*   CHLORIDE 107 107 104   CO2 21 21 22   GLUCOSE 104* 117* 126*   BUN 23* 22 19   CREATININE 0.81 0.77 0.87   CALCIUM 8.7 8.7 8.4*                   Imaging  US-RENAL   Final Result      No evidence of hydronephrosis.      Lobulated kidneys bilaterally.      CT-ABDOMEN-PELVIS WITH   Final Result      1.  There is no evidence of small bowel obstruction.   2.  There is a left lower quadrant ostomy.   3.  There is fluid distention of the colon distal to the surgical material in the left mid descending colon extending  all the way to the rectum. There is no pneumatosis or free air.   4.  There is cholelithiasis without biliary dilatation.   5.  There has been interval removal of the drainage catheter anterior to the spleen with minimal hypodense area in the anterior spleen again noted. There is no new fluid collection.      IR-US GUIDED PIV   Final Result    Ultrasound-guided PERIPHERAL IV INSERTION performed by    qualified nursing staff as above.      EC-ECHOCARDIOGRAM COMPLETE W/O CONT   Final Result      DX-LUMBAR SPINE-2 OR 3 VIEWS   Final Result      Moderate compression deformity of T11 is new compared to 2018.      Mild wedge deformity of T12 is unchanged.      Degenerative changes including facet arthropathy.      Mild retrolisthesis of L5 on S1 and L3 on L4.              Assessment/Plan  * Suicidal ideation- (present on admission)  Assessment & Plan  Has history of MDD, recurrent with psychotic features with active SI w/ plan.  Psychiatry consulted, continued legal hold  Legal hold until 3/18  To be discharged to psychiatric program when able to self-care for ostomy    Major depressive disorder, recurrent, severe with psychotic features (HCC)- (present on admission)  Assessment & Plan  Psychiatry and Psychology following  Continue paroxetine, ziprasidone, and clonazepam    Colostomy present (HCC)- (present on admission)  Assessment & Plan  History of fall at home Nov 2020 on left side with perforated colon and splenic fluid collection/hematoma s/p segmental colectomy, colostomy, mobilization of the splenic flexure, wound vac, and I&D Nov 2020.   Ostomy care consulted, but he was non-participatory  He was engaged in ostomy care with his bedside RN (3/10)  Patient must be independently able to care for his ostomy before inpatient psychiatry will accept him  Reversal timing and workup after 6 months per [facility]    Secondary adrenal insufficiency (HCC)- (present on admission)  Assessment & Plan  Cosyntropin  stimulation test reviewed with only a increase in his cortisol going from 14.5 then up to 21.3 after cosyntropin given  Renin 0.1, ACTH 3.4 and slight increase with cosyntropin suggesting secondary AI  Random cortisol 9.9  Needs outpatient endocrinology follow up.  Cristinaf held, Na/K and BP stable  Repeat adrenal function after discharge  Repeat AM BMP    Orthostatic hypotension- (present on admission)  Assessment & Plan  Multifactorial including adrenal insufficiency, medications (beta blocker, CCB, tamsulosin), diabetic autonomic dysfunction, and venous insufficiency  EF is noted to be 60%, significant improvement from prior.  Per cardiology, no further cardiac work-up needed  Follow-up with cardiology as an outpatient.  Continue midodrine, PRN IVF    Chest pain- (present on admission)  Assessment & Plan  Resolved  EKG and Troponins not consistent with ACS    Normal anion gap metabolic acidosis  Assessment & Plan  Resolved  Due to bicarbonate loss via ostomy  Repeat AM BMP    Syncope due to orthostatic hypotension- (present on admission)  Assessment & Plan  Likely secondary to orthostatic hypotension  Echo normal with 60% ejection fraction, improved from prior    Paroxysmal atrial fibrillation (HCC)- (present on admission)  Assessment & Plan  CHADSVASC 3 (HTN, CHF, DM).   TTE Feb 2021: EF 60%  Continue metoprolol, digoxin, and eliquis  Discontinued telemetry    Chronic HFrEF (heart failure with reduced ejection fraction) (HCC)- (present on admission)  Assessment & Plan  Recovered EF 60% on repeat echo.  Peripheral edema likely due to venous stasis.  I/Os, daily weight   No fluid restriction  Continue metoprolol XL    Diabetes mellitus type 2, insulin dependent (HCC)- (present on admission)  Assessment & Plan  A1c 5.2  Diabetic diet  No indication for strict BG control  SSI and accuchecks discontinued  Continue atorvastatin    Anxiety and depression- (present on admission)  Assessment & Plan  On Legal Hold until  3/18  Psychiatry consulted  Continue paroxetine, ziprasidone, and clonazepam  Would likely benefit from CBT or DBT    Hypothyroidism- (present on admission)  Assessment & Plan  TSH wnl Feb 2021  Continue home Levothyroxine    Debility- (present on admission)  Assessment & Plan  Likely due to depression  PT/OT evaluated, no further skilled needs    Essential hypertension- (present on admission)  Assessment & Plan  Resolved, has been hypotensive this admission in setting of secondary adrenal insufficiency  Continue midodrine    Lower limb amputation, great toe (HCC)- (present on admission)  Assessment & Plan  s/p right great toe amputation 2018    Hypokalemia  Assessment & Plan  K 3.4  Repeat AM BMP/Mg    Elevated troponin- (present on admission)  Assessment & Plan  RESOLVED  Stable. Suspect due to demand ischemia. No need to trend further.    Chronic venous insufficiency of lower extremity- (present on admission)  Assessment & Plan  Compression stockings    Nonischemic cardiomyopathy (HCC)- (present on admission)  Assessment & Plan  Normal coronaries, 2018.  Tachycardia mediated per cardiology  LVEF improved, based on echo 2/10/2021 LVEF 60%    Mixed hyperlipidemia- (present on admission)  Assessment & Plan  Continue atorvastatin    Obesity- (present on admission)  Assessment & Plan  Body mass index is 37.79 kg/m².  Counseled about diet and exercise       VTE prophylaxis:  Eliquis

## 2021-03-10 NOTE — PROGRESS NOTES
"Hospital Medicine Daily Progress Note    Date of Service  3/9/2021    Chief Complaint  56 y.o. male admitted 2/9/2021 with   Chief Complaint   Patient presents with   • Syncope     X1 this morning when standing up to go to bathroom         Hospital Course  Mr. Sebastián Castro is a 56 y.o. male history of atrial fibrillation secondary to hypercoagulable state, diabetes complicated by diabetic neuropathy, congestive heart failure, hyperlipidemia, orthostatic hypotension on fludrocortisone who presented on 2/9/2021 with syncopal episode.  Patient reported dizziness prior to syncope.  On presentation to the emergency room she was hypertensive and bradycardic.  Cardiology recommended stopping all AV sanket blocking agents.  He was also started on midodrine for hypotension.    The patient was started on digoxin and apixaban for atrial fibrillation.  Echocardiogram showed EF of 60%, improved from previous echocardiogram obtained on 11/2020.    Of note patient has history of a spleen injury complicated by perforation of splenic flexure in September 2020.  Status post colostomy and Interiano's pouch in November 2020.  He was at home taking care of his colostomy prior to most recent admission.  However he has become depressed, overwhelmed, and can no longer take care of his colostomy independently.    Psych continues to follow the patient; psychiatric medication as per psych recommendation.      Interval Problem Update  Legal hold extended for 1 week (3/18)  He recognizes that he must learn to cope with feeling like \"an animal\" due to ostomy output and discomfort.  He indicates that his suicidal thoughts going away \"will take time.\"   I reminded him that the plan is for ostomy reversal in the future, which he acknowledged.    No acute complaints.   Legs are still swollen. He will try compression stockings.  Denies F/C, N/V, pain, dyspnea, bowel/bladder dysfunction, bleeding.    Consultants/Specialty  Psychiatry - SI  Cardiology - " Hypotension and Atrial Fibrillation    Code Status  Full Code    Disposition  To inpatient psychiatry pending improvement in self-care of ostomy (must be independent for placement)    Review of Systems  Review of Systems   Constitutional: Negative for chills and fever.   HENT: Negative for ear pain, nosebleeds, sinus pain and sore throat.    Eyes: Negative for pain.   Respiratory: Negative for cough and shortness of breath.    Cardiovascular: Positive for leg swelling. Negative for chest pain.   Gastrointestinal: Negative for abdominal pain, constipation, diarrhea, nausea and vomiting.   Genitourinary: Negative for dysuria, flank pain and hematuria.   Musculoskeletal: Negative for back pain, joint pain, myalgias and neck pain.   Skin: Negative for rash.   Neurological: Negative for headaches.   Endo/Heme/Allergies: Does not bruise/bleed easily.   Psychiatric/Behavioral: Positive for depression and suicidal ideas.   All other systems reviewed and are negative.       Physical Exam  Temp:  [36.1 °C (96.9 °F)-37.2 °C (99 °F)] 36.8 °C (98.3 °F)  Pulse:  [65-78] 74  Resp:  [18-20] 18  BP: (115-130)/(63-79) 115/63  SpO2:  [86 %-97 %] 92 %    Physical Exam  Vitals and nursing note reviewed.   Constitutional:       General: He is not in acute distress.     Appearance: He is obese. He is not ill-appearing.   HENT:      Head: Normocephalic.      Nose: Nose normal.      Mouth/Throat:      Mouth: Mucous membranes are moist.      Pharynx: Oropharynx is clear.   Eyes:      General: No scleral icterus.     Conjunctiva/sclera: Conjunctivae normal.   Cardiovascular:      Rate and Rhythm: Normal rate and regular rhythm.      Pulses: Normal pulses.      Heart sounds: Normal heart sounds. No murmur. No friction rub. No gallop.    Pulmonary:      Effort: Pulmonary effort is normal. No respiratory distress.      Breath sounds: Normal breath sounds. No wheezing, rhonchi or rales.   Abdominal:      General: Abdomen is flat. Bowel sounds are  normal. There is no distension.      Palpations: Abdomen is soft.      Tenderness: There is no abdominal tenderness. There is no guarding or rebound.   Genitourinary:     Comments: Ostomy site dressed, nonerythematous, nontender.  Ostomy output soft, brown.  Musculoskeletal:         General: Normal range of motion.      Cervical back: Neck supple.      Right lower leg: Edema present.      Left lower leg: Edema present.   Skin:     General: Skin is warm.   Neurological:      Mental Status: He is alert.      Cranial Nerves: No cranial nerve deficit.      Motor: No weakness.      Comments: Appropriately conversant   Psychiatric:         Attention and Perception: Attention and perception normal.         Mood and Affect: Mood is depressed. Affect is flat.         Speech: Speech normal.         Behavior: Behavior is slowed and withdrawn. Behavior is cooperative.         Thought Content: Thought content includes suicidal ideation.         Cognition and Memory: Cognition and memory normal.         Fluids    Intake/Output Summary (Last 24 hours) at 3/9/2021 1710  Last data filed at 3/9/2021 1000  Gross per 24 hour   Intake 480 ml   Output 2900 ml   Net -2420 ml       Laboratory  Recent Labs     03/07/21  0009   WBC 9.6   RBC 5.48   HEMOGLOBIN 15.7   HEMATOCRIT 47.2   MCV 86.1   MCH 28.6   MCHC 33.3*   RDW 44.4   PLATELETCT 216   MPV 11.0     Recent Labs     03/07/21  0009 03/08/21  0529 03/09/21  0001   SODIUM 130* 137 137   POTASSIUM 3.8 3.5* 3.6   CHLORIDE 100 107 107   CO2 19* 21 21   GLUCOSE 113* 104* 117*   BUN 29* 23* 22   CREATININE 0.96 0.81 0.77   CALCIUM 9.0 8.7 8.7                   Imaging  US-RENAL   Final Result      No evidence of hydronephrosis.      Lobulated kidneys bilaterally.      CT-ABDOMEN-PELVIS WITH   Final Result      1.  There is no evidence of small bowel obstruction.   2.  There is a left lower quadrant ostomy.   3.  There is fluid distention of the colon distal to the surgical material in the  left mid descending colon extending all the way to the rectum. There is no pneumatosis or free air.   4.  There is cholelithiasis without biliary dilatation.   5.  There has been interval removal of the drainage catheter anterior to the spleen with minimal hypodense area in the anterior spleen again noted. There is no new fluid collection.      IR-US GUIDED PIV   Final Result    Ultrasound-guided PERIPHERAL IV INSERTION performed by    qualified nursing staff as above.      EC-ECHOCARDIOGRAM COMPLETE W/O CONT   Final Result      DX-LUMBAR SPINE-2 OR 3 VIEWS   Final Result      Moderate compression deformity of T11 is new compared to 2018.      Mild wedge deformity of T12 is unchanged.      Degenerative changes including facet arthropathy.      Mild retrolisthesis of L5 on S1 and L3 on L4.              Assessment/Plan  * Suicidal ideation- (present on admission)  Assessment & Plan  Has history of MDD, recurrent with psychotic features with active SI w/ plan.  Psychiatry consulted, continued legal hold  Legal hold until 3/18  To be discharged to psychiatric program when able to self-care for ostomy    Major depressive disorder, recurrent, severe with psychotic features (HCC)- (present on admission)  Assessment & Plan  Psychiatry and Psychology following  Continue paroxetine, ziprasidone, and clonazepam    Colostomy present (HCC)- (present on admission)  Assessment & Plan  History of fall at home Nov 2020 on left side with perforated colon and splenic fluid collection/hematoma s/p segmental colectomy, colostomy, mobilization of the splenic flexure, wound vac, and I&D Nov 2020.   Ostomy care consulted, but he was non-participatory  Patient must be independently able to care for his ostomy before inpatient psychiatry will accept him  Reversal timing and workup after 6 months per [facility]    Secondary adrenal insufficiency (HCC)- (present on admission)  Assessment & Plan  Cosyntropin stimulation test  reviewed with only a increase in his cortisol going from 14.5 then up to 21.3 after cosyntropin given  Renin 0.1, ACTH 3.4 and slight increase with cosyntropin suggesting secondary AI  Random cortisol 9.9  Needs outpatient endocrinology follow up.  Florinef held, Na/K and BP stable  Repeat adrenal function after discharge  Repeat AM BMP    Orthostatic hypotension- (present on admission)  Assessment & Plan  Multifactorial including adrenal insufficiency, medications (beta blocker, CCB, tamsulosin), diabetic autonomic dysfunction, and venous insufficiency  EF is noted to be 60%, significant improvement from prior.  Per cardiology, no further cardiac work-up needed  Follow-up with cardiology as an outpatient.  Continue midodrine, PRN IVF    Chest pain- (present on admission)  Assessment & Plan  Resolved  EKG and Troponins not consistent with ACS    Syncope due to orthostatic hypotension- (present on admission)  Assessment & Plan  Likely secondary to orthostatic hypotension  Echo normal with 60% ejection fraction, improved from prior    Paroxysmal atrial fibrillation (HCC)- (present on admission)  Assessment & Plan  CHADSVASC 3 (HTN, CHF, DM).   TTE Feb 2021: EF 60%  Continue metoprolol, digoxin, and eliquis  Discontinued telemetry    Chronic HFrEF (heart failure with reduced ejection fraction) (HCC)- (present on admission)  Assessment & Plan  Recovered EF 60% on repeat echo.  Peripheral edema likely due to venous stasis.  I/Os, daily weight   No fluid restriction  Continue metoprolol XL    Diabetes mellitus type 2, insulin dependent (HCC)- (present on admission)  Assessment & Plan  A1c 5.2  Diabetic diet  No indication for strict BG control  SSI and accuchecks discontinued  Continue atorvastatin    Anxiety and depression- (present on admission)  Assessment & Plan  On Legal Hold until 3/18  Psychiatry consulted  Continue paroxetine, ziprasidone, and clonazepam  Would likely benefit from CBT or DBT    Hypothyroidism-  (present on admission)  Assessment & Plan  TSH wnl Feb 2021  Continue home Levothyroxine    Debility- (present on admission)  Assessment & Plan  Likely due to depression  PT/OT evaluated, no further skilled needs    Essential hypertension- (present on admission)  Assessment & Plan  Resolved, has been hypotensive this admission in setting of secondary adrenal insufficiency  Continue midodrine    Lower limb amputation, great toe (HCC)- (present on admission)  Assessment & Plan  s/p right great toe amputation 2018    Elevated troponin- (present on admission)  Assessment & Plan  RESOLVED  Stable. Suspect due to demand ischemia. No need to trend further.    Chronic venous insufficiency of lower extremity- (present on admission)  Assessment & Plan  Compression stockings    Nonischemic cardiomyopathy (HCC)- (present on admission)  Assessment & Plan  Normal coronaries, 2018.  Tachycardia mediated per cardiology  LVEF improved, based on echo 2/10/2021 LVEF 60%    Mixed hyperlipidemia- (present on admission)  Assessment & Plan  Continue atorvastatin    Obesity- (present on admission)  Assessment & Plan  Body mass index is 37.79 kg/m².  Counseled about diet and exercise       VTE prophylaxis:  Eliquis

## 2021-03-11 LAB
ANION GAP SERPL CALC-SCNC: 9 MMOL/L (ref 7–16)
BUN SERPL-MCNC: 17 MG/DL (ref 8–22)
CALCIUM SERPL-MCNC: 8.5 MG/DL (ref 8.5–10.5)
CHLORIDE SERPL-SCNC: 105 MMOL/L (ref 96–112)
CO2 SERPL-SCNC: 23 MMOL/L (ref 20–33)
CREAT SERPL-MCNC: 0.82 MG/DL (ref 0.5–1.4)
GLUCOSE SERPL-MCNC: 131 MG/DL (ref 65–99)
MAGNESIUM SERPL-MCNC: 1.5 MG/DL (ref 1.5–2.5)
POTASSIUM SERPL-SCNC: 3.2 MMOL/L (ref 3.6–5.5)
SODIUM SERPL-SCNC: 137 MMOL/L (ref 135–145)

## 2021-03-11 PROCEDURE — A9270 NON-COVERED ITEM OR SERVICE: HCPCS | Performed by: INTERNAL MEDICINE

## 2021-03-11 PROCEDURE — 700102 HCHG RX REV CODE 250 W/ 637 OVERRIDE(OP): Performed by: INTERNAL MEDICINE

## 2021-03-11 PROCEDURE — 99233 SBSQ HOSP IP/OBS HIGH 50: CPT | Performed by: STUDENT IN AN ORGANIZED HEALTH CARE EDUCATION/TRAINING PROGRAM

## 2021-03-11 PROCEDURE — A9270 NON-COVERED ITEM OR SERVICE: HCPCS | Performed by: PSYCHIATRY & NEUROLOGY

## 2021-03-11 PROCEDURE — 80048 BASIC METABOLIC PNL TOTAL CA: CPT

## 2021-03-11 PROCEDURE — A9270 NON-COVERED ITEM OR SERVICE: HCPCS | Performed by: HOSPITALIST

## 2021-03-11 PROCEDURE — A9270 NON-COVERED ITEM OR SERVICE: HCPCS | Performed by: STUDENT IN AN ORGANIZED HEALTH CARE EDUCATION/TRAINING PROGRAM

## 2021-03-11 PROCEDURE — 700111 HCHG RX REV CODE 636 W/ 250 OVERRIDE (IP): Performed by: STUDENT IN AN ORGANIZED HEALTH CARE EDUCATION/TRAINING PROGRAM

## 2021-03-11 PROCEDURE — 770004 HCHG ROOM/CARE - ONCOLOGY PRIVATE *

## 2021-03-11 PROCEDURE — 700102 HCHG RX REV CODE 250 W/ 637 OVERRIDE(OP): Performed by: PSYCHIATRY & NEUROLOGY

## 2021-03-11 PROCEDURE — 700102 HCHG RX REV CODE 250 W/ 637 OVERRIDE(OP): Performed by: HOSPITALIST

## 2021-03-11 PROCEDURE — 83735 ASSAY OF MAGNESIUM: CPT

## 2021-03-11 PROCEDURE — 700102 HCHG RX REV CODE 250 W/ 637 OVERRIDE(OP): Performed by: GENERAL PRACTICE

## 2021-03-11 PROCEDURE — 700102 HCHG RX REV CODE 250 W/ 637 OVERRIDE(OP): Performed by: STUDENT IN AN ORGANIZED HEALTH CARE EDUCATION/TRAINING PROGRAM

## 2021-03-11 PROCEDURE — 36415 COLL VENOUS BLD VENIPUNCTURE: CPT

## 2021-03-11 PROCEDURE — A9270 NON-COVERED ITEM OR SERVICE: HCPCS | Performed by: GENERAL PRACTICE

## 2021-03-11 RX ORDER — POTASSIUM CHLORIDE 20 MEQ/1
40 TABLET, EXTENDED RELEASE ORAL EVERY 4 HOURS
Status: COMPLETED | OUTPATIENT
Start: 2021-03-11 | End: 2021-03-11

## 2021-03-11 RX ORDER — MAGNESIUM SULFATE HEPTAHYDRATE 40 MG/ML
2 INJECTION, SOLUTION INTRAVENOUS ONCE
Status: COMPLETED | OUTPATIENT
Start: 2021-03-11 | End: 2021-03-11

## 2021-03-11 RX ADMIN — ZIPRASIDONE HYDROCHLORIDE 60 MG: 60 CAPSULE ORAL at 21:16

## 2021-03-11 RX ADMIN — POTASSIUM CHLORIDE 40 MEQ: 1500 TABLET, EXTENDED RELEASE ORAL at 16:40

## 2021-03-11 RX ADMIN — METOPROLOL SUCCINATE 25 MG: 25 TABLET, EXTENDED RELEASE ORAL at 04:26

## 2021-03-11 RX ADMIN — LEVOTHYROXINE SODIUM 112 MCG: 0.11 TABLET ORAL at 04:26

## 2021-03-11 RX ADMIN — APIXABAN 5 MG: 5 TABLET, FILM COATED ORAL at 21:16

## 2021-03-11 RX ADMIN — DIGOXIN 125 MCG: 125 TABLET ORAL at 16:40

## 2021-03-11 RX ADMIN — DOCUSATE SODIUM 50 MG AND SENNOSIDES 8.6 MG 2 TABLET: 8.6; 5 TABLET, FILM COATED ORAL at 21:16

## 2021-03-11 RX ADMIN — MAGNESIUM SULFATE 2 G: 2 INJECTION INTRAVENOUS at 11:48

## 2021-03-11 RX ADMIN — DOCUSATE SODIUM 50 MG AND SENNOSIDES 8.6 MG 2 TABLET: 8.6; 5 TABLET, FILM COATED ORAL at 08:52

## 2021-03-11 RX ADMIN — ATORVASTATIN CALCIUM 40 MG: 40 TABLET, FILM COATED ORAL at 16:40

## 2021-03-11 RX ADMIN — PAROXETINE HYDROCHLORIDE 20 MG: 20 TABLET, FILM COATED ORAL at 08:51

## 2021-03-11 RX ADMIN — APIXABAN 5 MG: 5 TABLET, FILM COATED ORAL at 08:52

## 2021-03-11 RX ADMIN — ZIPRASIDONE HYDROCHLORIDE 60 MG: 60 CAPSULE ORAL at 08:52

## 2021-03-11 RX ADMIN — CLONAZEPAM 0.5 MG: 0.5 TABLET ORAL at 08:52

## 2021-03-11 RX ADMIN — MIDODRINE HYDROCHLORIDE 5 MG: 5 TABLET ORAL at 11:49

## 2021-03-11 RX ADMIN — CLONAZEPAM 1 MG: 1 TABLET ORAL at 16:40

## 2021-03-11 RX ADMIN — POTASSIUM CHLORIDE 40 MEQ: 1500 TABLET, EXTENDED RELEASE ORAL at 11:48

## 2021-03-11 ASSESSMENT — ENCOUNTER SYMPTOMS
MYALGIAS: 0
HEADACHES: 0
SORE THROAT: 0
VOMITING: 0
BACK PAIN: 0
DEPRESSION: 1
NECK PAIN: 0
EYE PAIN: 0
CONSTIPATION: 0
COUGH: 0
BRUISES/BLEEDS EASILY: 0
CHILLS: 0
FEVER: 0
ABDOMINAL PAIN: 0
SHORTNESS OF BREATH: 0
SINUS PAIN: 0
BLOOD IN STOOL: 0
DIARRHEA: 0
FLANK PAIN: 0
NAUSEA: 0

## 2021-03-11 ASSESSMENT — PAIN DESCRIPTION - PAIN TYPE: TYPE: ACUTE PAIN

## 2021-03-11 NOTE — CARE PLAN
"  Problem: Potential for Self Harm  Goal: Resolve acute exacerbation and return to psychiatric baseline  Outcome: PROGRESSING SLOWER THAN EXPECTED  Note: Pt partially participated in ostomy care this evening.     Problem: Potential for Self Harm  Goal: Improved mood and functioning  3/10/2021 2332 by Shirlene Mitchell R.N.  Outcome: DISCHARGED-GOAL NOT MET  Note: Pt continues to report thoughts of harming himself; states he would \"slit his throat\"  "

## 2021-03-11 NOTE — CARE PLAN
Problem: Safety  Goal: Will remain free from injury  Outcome: PROGRESSING AS EXPECTED  Note: 1:1 sitter at all times     Problem: Discharge Barriers/Planning  Goal: Patient's continuum of care needs will be met  Note: Placement to [facility]

## 2021-03-11 NOTE — CARE PLAN
Problem: Safety  Goal: Will remain free from injury  Outcome: PROGRESSING AS EXPECTED  Note: 1:1 sitter at all times

## 2021-03-11 NOTE — PROGRESS NOTES
Aaox4, 1:1sitter at bedside at all times, denies any discomfort, still verbalized SI, POc discussed, needs attended.

## 2021-03-12 ENCOUNTER — APPOINTMENT (OUTPATIENT)
Dept: RADIOLOGY | Facility: MEDICAL CENTER | Age: 56
DRG: 981 | End: 2021-03-12
Attending: STUDENT IN AN ORGANIZED HEALTH CARE EDUCATION/TRAINING PROGRAM
Payer: MEDICAID

## 2021-03-12 PROBLEM — R14.0 ABDOMINAL BLOATING: Status: ACTIVE | Noted: 2021-03-12

## 2021-03-12 LAB
ANION GAP SERPL CALC-SCNC: 12 MMOL/L (ref 7–16)
BUN SERPL-MCNC: 14 MG/DL (ref 8–22)
C DIFF DNA SPEC QL NAA+PROBE: NEGATIVE
C DIFF TOX GENS STL QL NAA+PROBE: NEGATIVE
CALCIUM SERPL-MCNC: 8.8 MG/DL (ref 8.5–10.5)
CHLORIDE SERPL-SCNC: 97 MMOL/L (ref 96–112)
CO2 SERPL-SCNC: 22 MMOL/L (ref 20–33)
CREAT SERPL-MCNC: 0.69 MG/DL (ref 0.5–1.4)
GLUCOSE SERPL-MCNC: 153 MG/DL (ref 65–99)
MAGNESIUM SERPL-MCNC: 1.6 MG/DL (ref 1.5–2.5)
POTASSIUM SERPL-SCNC: 4 MMOL/L (ref 3.6–5.5)
SODIUM SERPL-SCNC: 131 MMOL/L (ref 135–145)

## 2021-03-12 PROCEDURE — 700102 HCHG RX REV CODE 250 W/ 637 OVERRIDE(OP): Performed by: PSYCHIATRY & NEUROLOGY

## 2021-03-12 PROCEDURE — 700102 HCHG RX REV CODE 250 W/ 637 OVERRIDE(OP): Performed by: INTERNAL MEDICINE

## 2021-03-12 PROCEDURE — 700111 HCHG RX REV CODE 636 W/ 250 OVERRIDE (IP): Performed by: HOSPITALIST

## 2021-03-12 PROCEDURE — 74018 RADEX ABDOMEN 1 VIEW: CPT

## 2021-03-12 PROCEDURE — 770004 HCHG ROOM/CARE - ONCOLOGY PRIVATE *

## 2021-03-12 PROCEDURE — A9270 NON-COVERED ITEM OR SERVICE: HCPCS | Performed by: HOSPITALIST

## 2021-03-12 PROCEDURE — A9270 NON-COVERED ITEM OR SERVICE: HCPCS | Performed by: PSYCHIATRY & NEUROLOGY

## 2021-03-12 PROCEDURE — 83735 ASSAY OF MAGNESIUM: CPT

## 2021-03-12 PROCEDURE — 700102 HCHG RX REV CODE 250 W/ 637 OVERRIDE(OP): Performed by: HOSPITALIST

## 2021-03-12 PROCEDURE — A9270 NON-COVERED ITEM OR SERVICE: HCPCS | Performed by: INTERNAL MEDICINE

## 2021-03-12 PROCEDURE — A9270 NON-COVERED ITEM OR SERVICE: HCPCS | Performed by: GENERAL PRACTICE

## 2021-03-12 PROCEDURE — 80048 BASIC METABOLIC PNL TOTAL CA: CPT

## 2021-03-12 PROCEDURE — A9270 NON-COVERED ITEM OR SERVICE: HCPCS | Performed by: STUDENT IN AN ORGANIZED HEALTH CARE EDUCATION/TRAINING PROGRAM

## 2021-03-12 PROCEDURE — 87493 C DIFF AMPLIFIED PROBE: CPT

## 2021-03-12 PROCEDURE — 99233 SBSQ HOSP IP/OBS HIGH 50: CPT | Performed by: STUDENT IN AN ORGANIZED HEALTH CARE EDUCATION/TRAINING PROGRAM

## 2021-03-12 PROCEDURE — 700102 HCHG RX REV CODE 250 W/ 637 OVERRIDE(OP): Performed by: STUDENT IN AN ORGANIZED HEALTH CARE EDUCATION/TRAINING PROGRAM

## 2021-03-12 PROCEDURE — 700105 HCHG RX REV CODE 258: Performed by: STUDENT IN AN ORGANIZED HEALTH CARE EDUCATION/TRAINING PROGRAM

## 2021-03-12 PROCEDURE — 700102 HCHG RX REV CODE 250 W/ 637 OVERRIDE(OP): Performed by: GENERAL PRACTICE

## 2021-03-12 PROCEDURE — 36415 COLL VENOUS BLD VENIPUNCTURE: CPT

## 2021-03-12 PROCEDURE — 99231 SBSQ HOSP IP/OBS SF/LOW 25: CPT | Performed by: PSYCHIATRY & NEUROLOGY

## 2021-03-12 RX ORDER — SODIUM CHLORIDE 9 MG/ML
1000 INJECTION, SOLUTION INTRAVENOUS ONCE
Status: COMPLETED | OUTPATIENT
Start: 2021-03-12 | End: 2021-03-12

## 2021-03-12 RX ORDER — SIMETHICONE 80 MG
80 TABLET,CHEWABLE ORAL 3 TIMES DAILY
Status: DISCONTINUED | OUTPATIENT
Start: 2021-03-12 | End: 2021-05-13

## 2021-03-12 RX ADMIN — DOCUSATE SODIUM 50 MG AND SENNOSIDES 8.6 MG 2 TABLET: 8.6; 5 TABLET, FILM COATED ORAL at 12:03

## 2021-03-12 RX ADMIN — DIGOXIN 125 MCG: 125 TABLET ORAL at 17:49

## 2021-03-12 RX ADMIN — SODIUM CHLORIDE 1000 ML: 9 INJECTION, SOLUTION INTRAVENOUS at 12:08

## 2021-03-12 RX ADMIN — LEVOTHYROXINE SODIUM 112 MCG: 0.11 TABLET ORAL at 05:44

## 2021-03-12 RX ADMIN — APIXABAN 5 MG: 5 TABLET, FILM COATED ORAL at 22:15

## 2021-03-12 RX ADMIN — APIXABAN 5 MG: 5 TABLET, FILM COATED ORAL at 12:02

## 2021-03-12 RX ADMIN — MIDODRINE HYDROCHLORIDE 5 MG: 5 TABLET ORAL at 17:49

## 2021-03-12 RX ADMIN — SIMETHICONE 80 MG: 80 TABLET, CHEWABLE ORAL at 15:41

## 2021-03-12 RX ADMIN — CLONAZEPAM 1 MG: 1 TABLET ORAL at 17:49

## 2021-03-12 RX ADMIN — MIDODRINE HYDROCHLORIDE 5 MG: 5 TABLET ORAL at 12:03

## 2021-03-12 RX ADMIN — METOPROLOL SUCCINATE 25 MG: 25 TABLET, EXTENDED RELEASE ORAL at 05:43

## 2021-03-12 RX ADMIN — CLONAZEPAM 0.5 MG: 0.5 TABLET ORAL at 12:02

## 2021-03-12 RX ADMIN — PAROXETINE HYDROCHLORIDE 20 MG: 20 TABLET, FILM COATED ORAL at 12:02

## 2021-03-12 RX ADMIN — SIMETHICONE 80 MG: 80 TABLET, CHEWABLE ORAL at 17:49

## 2021-03-12 RX ADMIN — ONDANSETRON 4 MG: 2 INJECTION INTRAMUSCULAR; INTRAVENOUS at 09:03

## 2021-03-12 RX ADMIN — ZIPRASIDONE HYDROCHLORIDE 60 MG: 60 CAPSULE ORAL at 12:03

## 2021-03-12 RX ADMIN — ATORVASTATIN CALCIUM 40 MG: 40 TABLET, FILM COATED ORAL at 17:49

## 2021-03-12 ASSESSMENT — ENCOUNTER SYMPTOMS
BACK PAIN: 0
ABDOMINAL PAIN: 1
BLOOD IN STOOL: 0
DEPRESSION: 1
SORE THROAT: 0
NECK PAIN: 0
COUGH: 0
FLANK PAIN: 0
FEVER: 0
DIARRHEA: 0
SHORTNESS OF BREATH: 0
SINUS PAIN: 0
MYALGIAS: 0
BRUISES/BLEEDS EASILY: 0
VOMITING: 1
NAUSEA: 1
HEADACHES: 0
CONSTIPATION: 0
CHILLS: 0
EYE PAIN: 0

## 2021-03-12 NOTE — PROGRESS NOTES
Assumed 1:1 sitting from day shift unit clerk. Patient resting in bed, calm and pleasant. Offered water and dinner tray. Will continue to watch patient.

## 2021-03-12 NOTE — DISCHARGE PLANNING
Received VM from Adelaide PÉREZ CM, with [employer] insurance [phone] requesting to touch base regarding pt. LSW called Adelaide back, no answer, left VM with [phone] callback ext.

## 2021-03-12 NOTE — PROGRESS NOTES
Received care of pt from NOC RN this am. Pt is A+O x 4. C/O nausea. Given Zofran IV. Declining PO meds this am.

## 2021-03-12 NOTE — WOUND TEAM
"[facility]   Inpatient Services   Established Ostomy Management/ troubleshooting  HPI: Reviewed  PMH: Reviewed   SH: Reviewed   Reason for Ostomy nurse consult:  Established colostomy    Subjective: \"I'm feeling good today\"  Objective: appliance intact, sitter at bedside, pt on legal hold    Colostomy 11/10/20 Transverse LUQ (Active)   Wound Image      Stomal Appliance Assessment Changed    Stoma Assessment Red    Stoma Shape Budded Less Than One Inch;Oval    Stoma Size (in) 1.25    Peristomal Assessment Clean;Dry;Intact    Mucocutaneous Junction Intact    Treatment Appliance Changed;Site care    Peristomal Protectant No Sting Skin Prep    Stomal Appliance Paste Ring, 2\", forma flex barrier 2 1/4\" with pouch    Output (mL) 300 mL    Output Color Brown    WOUND RN ONLY - Stomal Appliance  2 Piece;Paste Ring, 2\";2 1/4\" Moldable    Appliance (Pouch) # [id number]    Appliance Brand Daisy                  Ostomy Appliance (type and size): 2 1/4\"  formaflex barrier with pouch and Paste Ring,      Interventions and Education: Assisted patient to empty pouch and remove appliance. Stoma and peristomal skin cleansed with moist washcloth. Demonstrated to patient how to useFormaflex barrier  And stretched to size, paste ring applied to vj stomal skin and then barrier adhered to skin. Assisted patient to attach pouch and close end.     Evaluation:   3/11/21: Patient formed own formaflex barrier pouch and paste ring, assisted in cleansing. Feeling better and willing to continue to get more hands on. Likes the formaflex because he is actually able to form it to his size. Still has not performed independently.     3/8: Minimal participation, he attempts to do the steps with his eyes closed and minimal effort with his hands. Not sure if he lacks the fine motor movement or just going through the motions due to his expressed hatred of the colostomy. Patient has yet to perform all steps independently.    3/5: Pt did " not participate in education today. Pt answered questions appropriately, but remained with his eyes closed through the duration of ostomy care. Declined hands on education when offered. Reported he was not feeling good. Formaflex barrier brought in today for pt as these do no require cutting with scissors. Extra barrier left in bag. CT of abd obtained yesterday did not reveal obstruction.    3/1: Viable stoma, producing stool. Per CNA report at bedside, appliance was leaking earlier and CNA changed appliance. Patient observed all steps today and participated with hands on care and did well. Discussed with MD on possibility for reversal. Per surgery, reversal of ostomy is an outpatient procedure that he will have to follow-up with in the future after discharge    2/26: Pt very down today, reports that psych has not seen him. Pt kept turning head away from ostomy today but did verbalized he will try to do hands on Sunday.     2/24/21: Pt unable to transfer to inpatient psych until he can care for colostomy independently. Ostomy RNs were asked to return for education. Pt extremely depressed today. Pt states he feels like an animal and that the colostomy is messy and smelly and he would rather die then care for it. This RN provided understanding and attempted to explain that colostomy is manageable. 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 Pt had colostomy created on November 10, 2020 by Dr. Desouza. May want to consider contacting surgery for possible reversal related to pts extreme body dysmorphia (this is pts 2nd admit for SI related to colostomy) and inability for pt to DC due to pts inability/unwillingness to care for colostomy - Discussed with Dr. Ley ([facility] Resident).           Plan: Wound team to continue with  teaching every 3-4 days to help teach patient how to care for appliance so that he can discharge to psych.    Anticipated discharge needs: patient would benefit from SNF, outpatient clinic or Home health for follow up care. Patient needs to follow up with Dr. Desouza after discharge to plan for ostomy reversal surgery.

## 2021-03-12 NOTE — CONSULTS
"PSYCHIATRIC FOLLOW-UP:(established)  *Reason for admission:  Syncope, hypotension. Being followed for SI.   *Legal Hold Status: on legal hold                *HPI: reports that he has been taking care of colostomy and his confidence improving. He has learned about the wax ring and has a spray  that might help with odors if he is unable to do it well and it can be smelled. It has exploded recently at night which is distressing. Reports a staff person  Came in and said \"It stinks in here\" which was upsetting to him but he is working on getting back to proceeding forth. That comment unsettled him and \"I got depressed and SI again\" but was beginning to feel better when I saw him today. There is improvement overall in mood.     *Psychiatric Examination:   Vitals:   Vitals:    03/11/21 2006 03/12/21 0543 03/12/21 0712 03/12/21 1200   BP: 123/83 136/68 113/65 (!) 86/52   Pulse: 95 (!) 112 76 98   Resp: 16 17 16    Temp: 36.9 °C (98.4 °F) 37 °C (98.6 °F) 37.4 °C (99.4 °F)    TempSrc: Temporal Temporal Temporal    SpO2: 94% 95% 93%    Weight:       Height:       General Appearance:  good eye contact  Abnormal Movements: none   Gait and Posture: lying in bed  Speech: soft  Thought Process: normal rate  Associations:   linear  Abnormal or Psychotic Thoughts: none  Judgement and Insight: fair  Orientation: grossly intact  Recent and Remote Memory: grossly intact  Attention Span and Concentration: intact  Language:fluent  Fund of Knowledge: not tested  Mood and Affect: depressed and worried but less overall  SI/HI:  suicidal - yes but less overall and homicidal - no      *ASSESSMENT/RECOMENDATIONS:  1. Major depression recurrent with mood congruent psychosis     - paxil to 20 mg   -geodon 60 mg bid for AH  -klonopin 0.5 /1 mg          2. Medical  -ostomy  -HTN  -pafib  -CHF  -DM2  -hypothyroidism  -orthostatic hypotension     Legal hold: on legal hold. It has been said he has \"secondary gain\" but unclear what the secondary gain " "is for being \"dependent\". Pt is now participating in changing his colostomy.      Observation status: 1:1 sitter  Privileges (while on legal hold): unchanged      *Will Follow    "

## 2021-03-12 NOTE — PROGRESS NOTES
Hospital Medicine Daily Progress Note    Date of Service  3/12/2021    Chief Complaint  56 y.o. male admitted 2/9/2021 with   Chief Complaint   Patient presents with   • Syncope     X1 this morning when standing up to go to bathroom         Hospital Course  Mr. Sebastián Castro is a 56 y.o. male history of atrial fibrillation secondary to hypercoagulable state, diabetes complicated by diabetic neuropathy, congestive heart failure, hyperlipidemia, orthostatic hypotension on fludrocortisone who presented on 2/9/2021 with syncopal episode.  Patient reported dizziness prior to syncope.  On presentation to the emergency room she was hypertensive and bradycardic.  Cardiology recommended stopping all AV sanket blocking agents.  He was also started on midodrine for hypotension.    The patient was started on digoxin and apixaban for atrial fibrillation.  Echocardiogram showed EF of 60%, improved from previous echocardiogram obtained on 11/2020.    Of note patient has history of a spleen injury complicated by perforation of splenic flexure in September 2020.  Status post colostomy and Interiano's pouch in November 2020.  He was at home taking care of his colostomy prior to most recent admission.  However he has become depressed, overwhelmed, and can no longer take care of his colostomy independently.    Psych continues to follow the patient; psychiatric medication as per psych recommendation.      Interval Problem Update  He reports severe nausea and multiple emesis ON.  He's having increased abdominal pain.  Output from the ostomy has been primarily gas and watery stool.  Abdominal XR with increased bowel gas but no signs of SBO.    BP 86/52. He had not taken midodrine this morning due to N/V. Initiated NS 1 L.    He denies hematemesis, hematochezia.  Denies dyspnea, F/C, bleeding, bladder dysfunction, other pain.    Discussed care with Psychiatrist Dr. Easley. Ongoing legal hold and psychiatric evaluation per   [facility].    Consultants/Specialty  Psychiatry - SI  Cardiology - Hypotension and Atrial Fibrillation    Code Status  Full Code    Disposition  To inpatient psychiatry pending improvement in self-care of ostomy (must be independent for placement)    Review of Systems  Review of Systems   Constitutional: Negative for chills and fever.   HENT: Negative for ear pain, nosebleeds, sinus pain and sore throat.    Eyes: Negative for pain.   Respiratory: Negative for cough and shortness of breath.    Cardiovascular: Positive for leg swelling. Negative for chest pain.   Gastrointestinal: Positive for abdominal pain, nausea and vomiting. Negative for blood in stool, constipation, diarrhea and melena.   Genitourinary: Negative for dysuria, flank pain and hematuria.   Musculoskeletal: Negative for back pain, joint pain, myalgias and neck pain.   Skin: Negative for rash.   Neurological: Negative for headaches.   Endo/Heme/Allergies: Does not bruise/bleed easily.   Psychiatric/Behavioral: Positive for depression.   All other systems reviewed and are negative.       Physical Exam  Temp:  [36.6 °C (97.8 °F)-37.4 °C (99.4 °F)] 37.4 °C (99.4 °F)  Pulse:  [] 98  Resp:  [16-18] 16  BP: ()/(52-83) 86/52  SpO2:  [93 %-97 %] 93 %    Physical Exam  Vitals and nursing note reviewed.   Constitutional:       General: He is not in acute distress.     Appearance: He is obese. He is ill-appearing (Acutely) and diaphoretic.   HENT:      Head: Normocephalic.      Nose: Nose normal.      Mouth/Throat:      Mouth: Mucous membranes are moist.      Pharynx: Oropharynx is clear.   Eyes:      General: No scleral icterus.     Conjunctiva/sclera: Conjunctivae normal.   Cardiovascular:      Rate and Rhythm: Tachycardia present. Rhythm irregularly irregular.      Pulses: Normal pulses.      Heart sounds: Normal heart sounds. No murmur. No friction rub. No gallop.    Pulmonary:      Effort: Pulmonary effort is normal. No respiratory distress.       Breath sounds: Normal breath sounds. No wheezing, rhonchi or rales.   Abdominal:      General: Abdomen is flat. Bowel sounds are normal. There is distension.      Palpations: Abdomen is soft.      Tenderness: There is abdominal tenderness (Mild, diffuse). There is no guarding or rebound.   Genitourinary:     Comments: Ostomy site dressed, nonerythematous, nontender.  Ostomy output tense gas, watery / brown stool.  Musculoskeletal:         General: Normal range of motion.      Cervical back: Neck supple.      Right lower leg: Edema present.      Left lower leg: Edema present.      Comments: 2+ edema to knees   Skin:     General: Skin is warm.      Comments: BLE chronic stasis dermatitis   Neurological:      Mental Status: He is alert.      Cranial Nerves: No cranial nerve deficit.      Motor: No weakness.      Comments: Appropriately conversant   Psychiatric:         Attention and Perception: Attention and perception normal.         Mood and Affect: Mood and affect normal.         Speech: Speech normal.         Behavior: Behavior normal. Behavior is cooperative.         Thought Content: Thought content does not include suicidal ideation.         Cognition and Memory: Cognition and memory normal.         Judgment: Judgment normal.         Fluids    Intake/Output Summary (Last 24 hours) at 3/12/2021 1251  Last data filed at 3/12/2021 1208  Gross per 24 hour   Intake --   Output 1225 ml   Net -1225 ml       Laboratory      Recent Labs     03/10/21  0008 03/11/21  0011 03/12/21  0005   SODIUM 136 137 131*   POTASSIUM 3.4* 3.2* 4.0   CHLORIDE 104 105 97   CO2 22 23 22   GLUCOSE 126* 131* 153*   BUN 19 17 14   CREATININE 0.87 0.82 0.69   CALCIUM 8.4* 8.5 8.8                   Imaging  US-RENAL   Final Result      No evidence of hydronephrosis.      Lobulated kidneys bilaterally.      CT-ABDOMEN-PELVIS WITH   Final Result      1.  There is no evidence of small bowel obstruction.   2.  There is a left lower quadrant ostomy.    3.  There is fluid distention of the colon distal to the surgical material in the left mid descending colon extending all the way to the rectum. There is no pneumatosis or free air.   4.  There is cholelithiasis without biliary dilatation.   5.  There has been interval removal of the drainage catheter anterior to the spleen with minimal hypodense area in the anterior spleen again noted. There is no new fluid collection.      IR-US GUIDED PIV   Final Result    Ultrasound-guided PERIPHERAL IV INSERTION performed by    qualified nursing staff as above.      EC-ECHOCARDIOGRAM COMPLETE W/O CONT   Final Result      DX-LUMBAR SPINE-2 OR 3 VIEWS   Final Result      Moderate compression deformity of T11 is new compared to 2018.      Mild wedge deformity of T12 is unchanged.      Degenerative changes including facet arthropathy.      Mild retrolisthesis of L5 on S1 and L3 on L4.         [id number] VIEW    (Results Pending)        Assessment/Plan  * Suicidal ideation- (present on admission)  Assessment & Plan  Has history of MDD, recurrent with psychotic features with active SI w/ plan.  Psychiatry consulted, continued legal hold  Legal hold until 3/18  To be discharged to psychiatric program when able to self-care for ostomy    Abdominal bloating  Assessment & Plan  Increasing gas output from ostomy  Intermittent episodes of abdominal pain and N/V  CT 3/4 did not demonstrate obstruction nor pneumotosis  KUB 3/12 for recurrent symptoms demonstrated increased gas but no signs of SBO  Simethicone scheduled 80 mg TID  If persistent, will confer with general surgeon about images and discuss empiric treatment for SIBO    Major depressive disorder, recurrent, severe with psychotic features (HCC)- (present on admission)  Assessment & Plan  Psychiatry and Psychology following  Continue paroxetine, ziprasidone, and clonazepam    Colostomy present (HCC)- (present on admission)  Assessment & Plan  History of fall at home Nov 2020 on  left side with perforated colon and splenic fluid collection/hematoma s/p segmental colectomy, colostomy, mobilization of the splenic flexure, wound vac, and I&D Nov 2020.   Ostomy care consulted, but he was non-participatory  He is engaging in ostomy care with his bedside RNs, encouraged to be observed but do it independently  Patient must be independently able to care for his ostomy before inpatient psychiatry will accept him  Reversal timing and workup after 6 months per [facility]    Secondary adrenal insufficiency (HCC)- (present on admission)  Assessment & Plan  Cosyntropin stimulation test reviewed with only a increase in his cortisol going from 14.5 then up to 21.3 after cosyntropin given  Renin 0.1, ACTH 3.4 and slight increase with cosyntropin suggesting secondary AI  Random cortisol 9.9  Needs outpatient endocrinology follow up.  Florinef held, Na/K and BP stable  Repeat adrenal function after discharge  Repeat AM BMP    Orthostatic hypotension- (present on admission)  Assessment & Plan  Multifactorial including adrenal insufficiency, medications (beta blocker, CCB, tamsulosin), diabetic autonomic dysfunction, and venous insufficiency  EF is noted to be 60%, significant improvement from prior.  Per cardiology, no further cardiac work-up needed  Follow-up with cardiology as an outpatient.  Continue midodrine, PRN IVF    Chest pain- (present on admission)  Assessment & Plan  Resolved  EKG and Troponins not consistent with ACS    Normal anion gap metabolic acidosis  Assessment & Plan  Resolved  Due to bicarbonate loss via ostomy  Repeat AM BMP    Syncope due to orthostatic hypotension- (present on admission)  Assessment & Plan  Likely secondary to orthostatic hypotension  Echo normal with 60% ejection fraction, improved from prior    Paroxysmal atrial fibrillation (HCC)- (present on admission)  Assessment & Plan  CHADSVASC 3 (HTN, CHF, DM).   TTE Feb 2021: EF 60%  Continue metoprolol, digoxin,  and eliquis  Discontinued telemetry    Chronic HFrEF (heart failure with reduced ejection fraction) ([facility])- (present on admission)  Assessment & Plan  Recovered EF 60% on repeat echo.  Peripheral edema likely due to venous stasis.  I/Os, daily weight   No fluid restriction  Continue metoprolol XL    Diabetes mellitus type 2, insulin dependent (HCC)- (present on admission)  Assessment & Plan  A1c 5.2  Diabetic diet  No indication for strict BG control  SSI and accuchecks discontinued  Continue atorvastatin    Anxiety and depression- (present on admission)  Assessment & Plan  On Legal Hold until 3/18  Psychiatry consulted  Continue paroxetine, ziprasidone, and clonazepam  Would likely benefit from CBT or DBT    Hypothyroidism- (present on admission)  Assessment & Plan  TSH wnl Feb 2021  Continue home Levothyroxine    Debility- (present on admission)  Assessment & Plan  Likely due to depression  PT/OT evaluated, no further skilled needs    Essential hypertension- (present on admission)  Assessment & Plan  Resolved, has been hypotensive this admission in setting of secondary adrenal insufficiency  Continue midodrine    Lower limb amputation, great toe ([facility])- (present on admission)  Assessment & Plan  s/p right great toe amputation 2018    Hypomagnesemia  Assessment & Plan  Resolved  Repeat AM BMP/Mg    Hypokalemia  Assessment & Plan  Resolved  Repeat AM BMP/Mg    Hyponatremia  Assessment & Plan  Recurrent  Mild, Na 131  1 L NS today for hypotension  If not improving, will reinitiate mineralocorticoid due to adrenal insufficiency    Elevated troponin- (present on admission)  Assessment & Plan  RESOLVED  Stable. Suspect due to demand ischemia. No need to trend further.    Chronic venous insufficiency of lower extremity- (present on admission)  Assessment & Plan  Compression stockings    Nonischemic cardiomyopathy (HCC)- (present on admission)  Assessment & Plan  Normal coronaries, 2018.  Tachycardia mediated per  cardiology  LVEF improved, based on echo 2/10/2021 LVEF 60%    Mixed hyperlipidemia- (present on admission)  Assessment & Plan  Continue atorvastatin    Obesity- (present on admission)  Assessment & Plan  Body mass index is 37.79 kg/m².  Counseled about diet and exercise       VTE prophylaxis:  Eliquis

## 2021-03-12 NOTE — PROGRESS NOTES
"In room this am to assess pt's suicidal ideation. Pt laughs and says: \"I was doing so good yesterday, but I am not so well today.\" Asked pt if he was hearing voices, and having thoughts of harming himself, he answers; \"Yes.\" Emotional support given to pt. Pt has a care aide 1:1 who will be at BS throughout the shift.   "

## 2021-03-12 NOTE — ASSESSMENT & PLAN NOTE
Resolved  CT 3/4 did not demonstrate obstruction nor pneumotosis  Simethicone scheduled 80 mg TID  Continue supportive care

## 2021-03-12 NOTE — PROGRESS NOTES
BP found to be low - see docflowsheets. Pt is mildly diaphoretic, but has no other c/o. He states he is OK to take his PO am meds. Discussed pt's care with Hospitalist. OK for pt to have pills. Bolus ordered and started. XR changed to portable per hospitalist. Called imaging to inform them.

## 2021-03-12 NOTE — PROGRESS NOTES
Hospital Medicine Daily Progress Note    Date of Service  3/11/2021    Chief Complaint  56 y.o. male admitted 2/9/2021 with   Chief Complaint   Patient presents with   • Syncope     X1 this morning when standing up to go to bathroom         Hospital Course  Mr. Sebastián Castro is a 56 y.o. male history of atrial fibrillation secondary to hypercoagulable state, diabetes complicated by diabetic neuropathy, congestive heart failure, hyperlipidemia, orthostatic hypotension on fludrocortisone who presented on 2/9/2021 with syncopal episode.  Patient reported dizziness prior to syncope.  On presentation to the emergency room she was hypertensive and bradycardic.  Cardiology recommended stopping all AV sanket blocking agents.  He was also started on midodrine for hypotension.    The patient was started on digoxin and apixaban for atrial fibrillation.  Echocardiogram showed EF of 60%, improved from previous echocardiogram obtained on 11/2020.    Of note patient has history of a spleen injury complicated by perforation of splenic flexure in September 2020.  Status post colostomy and Interiano's pouch in November 2020.  He was at home taking care of his colostomy prior to most recent admission.  However he has become depressed, overwhelmed, and can no longer take care of his colostomy independently.    Psych continues to follow the patient; psychiatric medication as per psych recommendation.      Interval Problem Update  RADHA ON  He reports still feeling depressed and having thoughts of suicide.  He learned to burp his ostomy bag today.  His RN, Maurizio, supervised and guided but allowed Mr. Castro to do it himself.  Denies pain, N/V, F/C, bleeding, dyspnea, bladder dysfunction.    Consultants/Specialty  Psychiatry - SI  Cardiology - Hypotension and Atrial Fibrillation    Code Status  Full Code    Disposition  To inpatient psychiatry pending improvement in self-care of ostomy (must be independent for placement)    Review of  Systems  Review of Systems   Constitutional: Negative for chills and fever.   HENT: Negative for ear pain, nosebleeds, sinus pain and sore throat.    Eyes: Negative for pain.   Respiratory: Negative for cough and shortness of breath.    Cardiovascular: Positive for leg swelling. Negative for chest pain.   Gastrointestinal: Negative for abdominal pain, blood in stool, constipation, diarrhea, melena, nausea and vomiting.   Genitourinary: Negative for dysuria, flank pain and hematuria.   Musculoskeletal: Negative for back pain, joint pain, myalgias and neck pain.   Skin: Negative for rash.   Neurological: Negative for headaches.   Endo/Heme/Allergies: Does not bruise/bleed easily.   Psychiatric/Behavioral: Positive for depression and suicidal ideas.   All other systems reviewed and are negative.       Physical Exam  Temp:  [36.6 °C (97.8 °F)-37 °C (98.6 °F)] 36.6 °C (97.8 °F)  Pulse:  [68-84] 84  Resp:  [16-18] 18  BP: (117-128)/(62-76) 117/76  SpO2:  [91 %-97 %] 97 %    Physical Exam  Vitals and nursing note reviewed.   Constitutional:       General: He is not in acute distress.     Appearance: He is obese. He is not ill-appearing.   HENT:      Head: Normocephalic.      Nose: Nose normal.      Mouth/Throat:      Mouth: Mucous membranes are moist.      Pharynx: Oropharynx is clear.   Eyes:      General: No scleral icterus.     Conjunctiva/sclera: Conjunctivae normal.   Cardiovascular:      Rate and Rhythm: Normal rate and regular rhythm.      Pulses: Normal pulses.      Heart sounds: Normal heart sounds. No murmur. No friction rub. No gallop.    Pulmonary:      Effort: Pulmonary effort is normal. No respiratory distress.      Breath sounds: Normal breath sounds. No wheezing, rhonchi or rales.   Abdominal:      General: Abdomen is flat. Bowel sounds are normal. There is no distension.      Palpations: Abdomen is soft.      Tenderness: There is no abdominal tenderness. There is no guarding or rebound.   Genitourinary:      Comments: Ostomy site dressed, nonerythematous, nontender.  Ostomy output soft, greenish brown.  Musculoskeletal:         General: Normal range of motion.      Cervical back: Neck supple.      Right lower leg: Edema present.      Left lower leg: Edema present.   Skin:     General: Skin is warm.      Comments: BLE chronic stasis dermatitis   Neurological:      Mental Status: He is alert.      Cranial Nerves: No cranial nerve deficit.      Motor: No weakness.      Comments: Appropriately conversant   Psychiatric:         Attention and Perception: Attention and perception normal.         Mood and Affect: Mood and affect normal.         Speech: Speech normal.         Behavior: Behavior normal. Behavior is cooperative.         Thought Content: Thought content does not include suicidal ideation.         Cognition and Memory: Cognition and memory normal.         Judgment: Judgment normal.         Fluids  No intake or output data in the 24 hours ending 03/11/21 1956    Laboratory      Recent Labs     03/09/21  0001 03/10/21  0008 03/11/21  0011   SODIUM 137 136 137   POTASSIUM 3.6 3.4* 3.2*   CHLORIDE 107 104 105   CO2 21 22 23   GLUCOSE 117* 126* 131*   BUN 22 19 17   CREATININE 0.77 0.87 0.82   CALCIUM 8.7 8.4* 8.5                   Imaging  US-RENAL   Final Result      No evidence of hydronephrosis.      Lobulated kidneys bilaterally.      CT-ABDOMEN-PELVIS WITH   Final Result      1.  There is no evidence of small bowel obstruction.   2.  There is a left lower quadrant ostomy.   3.  There is fluid distention of the colon distal to the surgical material in the left mid descending colon extending all the way to the rectum. There is no pneumatosis or free air.   4.  There is cholelithiasis without biliary dilatation.   5.  There has been interval removal of the drainage catheter anterior to the spleen with minimal hypodense area in the anterior spleen again noted. There is no new fluid collection.      IR-US GUIDED PIV    Final Result    Ultrasound-guided PERIPHERAL IV INSERTION performed by    qualified nursing staff as above.      EC-ECHOCARDIOGRAM COMPLETE W/O CONT   Final Result      DX-LUMBAR SPINE-2 OR 3 VIEWS   Final Result      Moderate compression deformity of T11 is new compared to 2018.      Mild wedge deformity of T12 is unchanged.      Degenerative changes including facet arthropathy.      Mild retrolisthesis of L5 on S1 and L3 on L4.              Assessment/Plan  I saw and examined Sebastián Castro (3/11/21) and have updated the HPI, ROS, Physical Exam, and Assessment & Plan where appropriate.    * Suicidal ideation- (present on admission)  Assessment & Plan  Has history of MDD, recurrent with psychotic features with active SI w/ plan.  Psychiatry consulted, continued legal hold  Legal hold until 3/18  To be discharged to psychiatric program when able to self-care for ostomy    Major depressive disorder, recurrent, severe with psychotic features (HCC)- (present on admission)  Assessment & Plan  Psychiatry and Psychology following  Continue paroxetine, ziprasidone, and clonazepam    Colostomy present (HCC)- (present on admission)  Assessment & Plan  History of fall at home Nov 2020 on left side with perforated colon and splenic fluid collection/hematoma s/p segmental colectomy, colostomy, mobilization of the splenic flexure, wound vac, and I&D Nov 2020.   Ostomy care consulted, but he was non-participatory  He is engaging in ostomy care with his bedside RNs, encouraged to be observed but do it independently  Patient must be independently able to care for his ostomy before inpatient psychiatry will accept him  Reversal timing and workup after 6 months per [facility]    Secondary adrenal insufficiency (HCC)- (present on admission)  Assessment & Plan  Cosyntropin stimulation test reviewed with only a increase in his cortisol going from 14.5 then up to 21.3 after cosyntropin given  Renin 0.1, ACTH 3.4 and slight  increase with cosyntropin suggesting secondary AI  Random cortisol 9.9  Needs outpatient endocrinology follow up.  Florinef held, Na/K and BP stable  Repeat adrenal function after discharge  Repeat AM BMP    Orthostatic hypotension- (present on admission)  Assessment & Plan  Multifactorial including adrenal insufficiency, medications (beta blocker, CCB, tamsulosin), diabetic autonomic dysfunction, and venous insufficiency  EF is noted to be 60%, significant improvement from prior.  Per cardiology, no further cardiac work-up needed  Follow-up with cardiology as an outpatient.  Continue midodrine, PRN IVF    Chest pain- (present on admission)  Assessment & Plan  Resolved  EKG and Troponins not consistent with ACS    Normal anion gap metabolic acidosis  Assessment & Plan  Resolved  Due to bicarbonate loss via ostomy  Repeat AM BMP    Syncope due to orthostatic hypotension- (present on admission)  Assessment & Plan  Likely secondary to orthostatic hypotension  Echo normal with 60% ejection fraction, improved from prior    Paroxysmal atrial fibrillation (HCC)- (present on admission)  Assessment & Plan  CHADSVASC 3 (HTN, CHF, DM).   TTE Feb 2021: EF 60%  Continue metoprolol, digoxin, and eliquis  Discontinued telemetry    Chronic HFrEF (heart failure with reduced ejection fraction) (HCC)- (present on admission)  Assessment & Plan  Recovered EF 60% on repeat echo.  Peripheral edema likely due to venous stasis.  I/Os, daily weight   No fluid restriction  Continue metoprolol XL    Diabetes mellitus type 2, insulin dependent (HCC)- (present on admission)  Assessment & Plan  A1c 5.2  Diabetic diet  No indication for strict BG control  SSI and accuchecks discontinued  Continue atorvastatin    Anxiety and depression- (present on admission)  Assessment & Plan  On Legal Hold until 3/18  Psychiatry consulted  Continue paroxetine, ziprasidone, and clonazepam  Would likely benefit from CBT or DBT    Hypothyroidism- (present on  admission)  Assessment & Plan  TSH wnl Feb 2021  Continue home Levothyroxine    Debility- (present on admission)  Assessment & Plan  Likely due to depression  PT/OT evaluated, no further skilled needs    Essential hypertension- (present on admission)  Assessment & Plan  Resolved, has been hypotensive this admission in setting of secondary adrenal insufficiency  Continue midodrine    Lower limb amputation, great toe (HCC)- (present on admission)  Assessment & Plan  s/p right great toe amputation 2018    Hypomagnesemia  Assessment & Plan  Mg 1.5 in setting of hypokalemia  2g IV MgSO4  Repeat AM BMP/Mg    Hypokalemia  Assessment & Plan  K 3.2  Repleting Mg  Kdur 40 x2  Repeat AM BMP/Mg    Elevated troponin- (present on admission)  Assessment & Plan  RESOLVED  Stable. Suspect due to demand ischemia. No need to trend further.    Chronic venous insufficiency of lower extremity- (present on admission)  Assessment & Plan  Compression stockings    Nonischemic cardiomyopathy (HCC)- (present on admission)  Assessment & Plan  Normal coronaries, 2018.  Tachycardia mediated per cardiology  LVEF improved, based on echo 2/10/2021 LVEF 60%    Mixed hyperlipidemia- (present on admission)  Assessment & Plan  Continue atorvastatin    Obesity- (present on admission)  Assessment & Plan  Body mass index is 37.79 kg/m².  Counseled about diet and exercise       VTE prophylaxis:  Eliquis

## 2021-03-12 NOTE — CARE PLAN
"  Problem: Knowledge Deficit  Goal: Knowledge of disease process/condition, treatment plan, diagnostic tests, and medications will improve  Outcome: PROGRESSING AS EXPECTED    Discussed plan of care for today w/ pt this am, he is A+O x 4, he is upset because he is nauseous, he says: \"I am still nauseous!\" He verbalizes understanding of his plan of care, no questions. Emotional support given. Reinforcing education as necessary. Discussed pt's care with Hospitalist Dr. Decker this am.         Problem: Pain Management  Goal: Pain level will decrease to patient's comfort goal  Outcome: PROGRESSING AS EXPECTED    Denies need for pain medication.      "

## 2021-03-13 ENCOUNTER — APPOINTMENT (OUTPATIENT)
Dept: RADIOLOGY | Facility: MEDICAL CENTER | Age: 56
DRG: 981 | End: 2021-03-13
Attending: STUDENT IN AN ORGANIZED HEALTH CARE EDUCATION/TRAINING PROGRAM
Payer: MEDICAID

## 2021-03-13 PROBLEM — I50.1 CARDIOGENIC PULMONARY EDEMA (HCC): Status: ACTIVE | Noted: 2021-03-13

## 2021-03-13 PROBLEM — D72.829 LEUKOCYTOSIS: Status: ACTIVE | Noted: 2021-03-13

## 2021-03-13 LAB
ANION GAP SERPL CALC-SCNC: 11 MMOL/L (ref 7–16)
APPEARANCE UR: CLEAR
BACTERIA #/AREA URNS HPF: ABNORMAL /HPF
BASOPHILS # BLD AUTO: 0 % (ref 0–1.8)
BASOPHILS # BLD: 0 K/UL (ref 0–0.12)
BILIRUB UR QL STRIP.AUTO: NEGATIVE
BUN SERPL-MCNC: 21 MG/DL (ref 8–22)
CALCIUM SERPL-MCNC: 8.6 MG/DL (ref 8.5–10.5)
CHLORIDE SERPL-SCNC: 101 MMOL/L (ref 96–112)
CO2 SERPL-SCNC: 23 MMOL/L (ref 20–33)
COLOR UR: YELLOW
CREAT SERPL-MCNC: 1.54 MG/DL (ref 0.5–1.4)
EOSINOPHIL # BLD AUTO: 0.48 K/UL (ref 0–0.51)
EOSINOPHIL NFR BLD: 1.7 % (ref 0–6.9)
EPI CELLS #/AREA URNS HPF: NEGATIVE /HPF
ERYTHROCYTE [DISTWIDTH] IN BLOOD BY AUTOMATED COUNT: 45.5 FL (ref 35.9–50)
GLUCOSE SERPL-MCNC: 117 MG/DL (ref 65–99)
GLUCOSE UR STRIP.AUTO-MCNC: NEGATIVE MG/DL
HCT VFR BLD AUTO: 41 % (ref 42–52)
HGB BLD-MCNC: 13.3 G/DL (ref 14–18)
HYALINE CASTS #/AREA URNS LPF: ABNORMAL /LPF
KETONES UR STRIP.AUTO-MCNC: NEGATIVE MG/DL
LEUKOCYTE ESTERASE UR QL STRIP.AUTO: ABNORMAL
LYMPHOCYTES # BLD AUTO: 4.19 K/UL (ref 1–4.8)
LYMPHOCYTES NFR BLD: 14.8 % (ref 22–41)
MAGNESIUM SERPL-MCNC: 1.8 MG/DL (ref 1.5–2.5)
MANUAL DIFF BLD: NORMAL
MCH RBC QN AUTO: 28.4 PG (ref 27–33)
MCHC RBC AUTO-ENTMCNC: 32.4 G/DL (ref 33.7–35.3)
MCV RBC AUTO: 87.4 FL (ref 81.4–97.8)
MICRO URNS: ABNORMAL
MONOCYTES # BLD AUTO: 1.47 K/UL (ref 0–0.85)
MONOCYTES NFR BLD AUTO: 5.2 % (ref 0–13.4)
MORPHOLOGY BLD-IMP: NORMAL
NEUTROPHILS # BLD AUTO: 22.16 K/UL (ref 1.82–7.42)
NEUTROPHILS NFR BLD: 78.3 % (ref 44–72)
NITRITE UR QL STRIP.AUTO: NEGATIVE
NRBC # BLD AUTO: 0 K/UL
NRBC BLD-RTO: 0 /100 WBC
NT-PROBNP SERPL IA-MCNC: 7828 PG/ML (ref 0–125)
PH UR STRIP.AUTO: 5 [PH] (ref 5–8)
PLATELET # BLD AUTO: 254 K/UL (ref 164–446)
PLATELET BLD QL SMEAR: NORMAL
PMV BLD AUTO: 10.2 FL (ref 9–12.9)
POLYCHROMASIA BLD QL SMEAR: NORMAL
POTASSIUM SERPL-SCNC: 3.5 MMOL/L (ref 3.6–5.5)
PROCALCITONIN SERPL-MCNC: 37.13 NG/ML
PROT UR QL STRIP: NEGATIVE MG/DL
RBC # BLD AUTO: 4.69 M/UL (ref 4.7–6.1)
RBC # URNS HPF: ABNORMAL /HPF
RBC BLD AUTO: PRESENT
RBC UR QL AUTO: NEGATIVE
SODIUM SERPL-SCNC: 135 MMOL/L (ref 135–145)
SP GR UR STRIP.AUTO: >=1.03
UROBILINOGEN UR STRIP.AUTO-MCNC: 0.2 MG/DL
WBC # BLD AUTO: 28.3 K/UL (ref 4.8–10.8)
WBC #/AREA URNS HPF: ABNORMAL /HPF

## 2021-03-13 PROCEDURE — A9270 NON-COVERED ITEM OR SERVICE: HCPCS | Performed by: INTERNAL MEDICINE

## 2021-03-13 PROCEDURE — 99233 SBSQ HOSP IP/OBS HIGH 50: CPT | Performed by: STUDENT IN AN ORGANIZED HEALTH CARE EDUCATION/TRAINING PROGRAM

## 2021-03-13 PROCEDURE — 36415 COLL VENOUS BLD VENIPUNCTURE: CPT

## 2021-03-13 PROCEDURE — 71045 X-RAY EXAM CHEST 1 VIEW: CPT

## 2021-03-13 PROCEDURE — 83880 ASSAY OF NATRIURETIC PEPTIDE: CPT

## 2021-03-13 PROCEDURE — 80048 BASIC METABOLIC PNL TOTAL CA: CPT

## 2021-03-13 PROCEDURE — 74018 RADEX ABDOMEN 1 VIEW: CPT

## 2021-03-13 PROCEDURE — 84145 PROCALCITONIN (PCT): CPT

## 2021-03-13 PROCEDURE — 700102 HCHG RX REV CODE 250 W/ 637 OVERRIDE(OP): Performed by: GENERAL PRACTICE

## 2021-03-13 PROCEDURE — 87086 URINE CULTURE/COLONY COUNT: CPT

## 2021-03-13 PROCEDURE — 87077 CULTURE AEROBIC IDENTIFY: CPT | Mod: 91

## 2021-03-13 PROCEDURE — 81001 URINALYSIS AUTO W/SCOPE: CPT

## 2021-03-13 PROCEDURE — 700102 HCHG RX REV CODE 250 W/ 637 OVERRIDE(OP): Performed by: STUDENT IN AN ORGANIZED HEALTH CARE EDUCATION/TRAINING PROGRAM

## 2021-03-13 PROCEDURE — 700102 HCHG RX REV CODE 250 W/ 637 OVERRIDE(OP): Performed by: PSYCHIATRY & NEUROLOGY

## 2021-03-13 PROCEDURE — 85007 BL SMEAR W/DIFF WBC COUNT: CPT

## 2021-03-13 PROCEDURE — A9270 NON-COVERED ITEM OR SERVICE: HCPCS | Performed by: PSYCHIATRY & NEUROLOGY

## 2021-03-13 PROCEDURE — A9270 NON-COVERED ITEM OR SERVICE: HCPCS | Performed by: GENERAL PRACTICE

## 2021-03-13 PROCEDURE — 90837 PSYTX W PT 60 MINUTES: CPT | Performed by: PSYCHOLOGIST

## 2021-03-13 PROCEDURE — A9270 NON-COVERED ITEM OR SERVICE: HCPCS | Performed by: HOSPITALIST

## 2021-03-13 PROCEDURE — 700111 HCHG RX REV CODE 636 W/ 250 OVERRIDE (IP): Performed by: STUDENT IN AN ORGANIZED HEALTH CARE EDUCATION/TRAINING PROGRAM

## 2021-03-13 PROCEDURE — 770004 HCHG ROOM/CARE - ONCOLOGY PRIVATE *

## 2021-03-13 PROCEDURE — 87186 SC STD MICRODIL/AGAR DIL: CPT | Mod: 91

## 2021-03-13 PROCEDURE — 85027 COMPLETE CBC AUTOMATED: CPT

## 2021-03-13 PROCEDURE — A9270 NON-COVERED ITEM OR SERVICE: HCPCS | Performed by: STUDENT IN AN ORGANIZED HEALTH CARE EDUCATION/TRAINING PROGRAM

## 2021-03-13 PROCEDURE — 700105 HCHG RX REV CODE 258: Performed by: STUDENT IN AN ORGANIZED HEALTH CARE EDUCATION/TRAINING PROGRAM

## 2021-03-13 PROCEDURE — 700102 HCHG RX REV CODE 250 W/ 637 OVERRIDE(OP): Performed by: INTERNAL MEDICINE

## 2021-03-13 PROCEDURE — 700102 HCHG RX REV CODE 250 W/ 637 OVERRIDE(OP): Performed by: HOSPITALIST

## 2021-03-13 PROCEDURE — 83735 ASSAY OF MAGNESIUM: CPT

## 2021-03-13 PROCEDURE — 87040 BLOOD CULTURE FOR BACTERIA: CPT | Mod: 91

## 2021-03-13 RX ORDER — FUROSEMIDE 10 MG/ML
40 INJECTION INTRAMUSCULAR; INTRAVENOUS
Status: DISCONTINUED | OUTPATIENT
Start: 2021-03-13 | End: 2021-03-14

## 2021-03-13 RX ORDER — MIDODRINE HYDROCHLORIDE 5 MG/1
10 TABLET ORAL
Status: DISCONTINUED | OUTPATIENT
Start: 2021-03-13 | End: 2021-03-19

## 2021-03-13 RX ORDER — DIPHENHYDRAMINE HYDROCHLORIDE 50 MG/ML
25 INJECTION INTRAMUSCULAR; INTRAVENOUS EVERY 6 HOURS PRN
Status: DISCONTINUED | OUTPATIENT
Start: 2021-03-13 | End: 2021-03-16

## 2021-03-13 RX ORDER — MIDODRINE HYDROCHLORIDE 5 MG/1
5 TABLET ORAL ONCE
Status: COMPLETED | OUTPATIENT
Start: 2021-03-13 | End: 2021-03-13

## 2021-03-13 RX ADMIN — APIXABAN 5 MG: 5 TABLET, FILM COATED ORAL at 08:18

## 2021-03-13 RX ADMIN — CLONAZEPAM 1 MG: 1 TABLET ORAL at 18:19

## 2021-03-13 RX ADMIN — PAROXETINE HYDROCHLORIDE 20 MG: 20 TABLET, FILM COATED ORAL at 08:18

## 2021-03-13 RX ADMIN — DOCUSATE SODIUM 50 MG AND SENNOSIDES 8.6 MG 2 TABLET: 8.6; 5 TABLET, FILM COATED ORAL at 08:18

## 2021-03-13 RX ADMIN — APIXABAN 5 MG: 5 TABLET, FILM COATED ORAL at 20:14

## 2021-03-13 RX ADMIN — LEVOTHYROXINE SODIUM 112 MCG: 0.11 TABLET ORAL at 05:20

## 2021-03-13 RX ADMIN — ZIPRASIDONE HYDROCHLORIDE 60 MG: 60 CAPSULE ORAL at 08:20

## 2021-03-13 RX ADMIN — PIPERACILLIN AND TAZOBACTAM 4.5 G: 4; .5 INJECTION, POWDER, LYOPHILIZED, FOR SOLUTION INTRAVENOUS; PARENTERAL at 21:17

## 2021-03-13 RX ADMIN — SIMETHICONE 80 MG: 80 TABLET, CHEWABLE ORAL at 05:20

## 2021-03-13 RX ADMIN — CLONAZEPAM 0.5 MG: 0.5 TABLET ORAL at 08:18

## 2021-03-13 RX ADMIN — SIMETHICONE 80 MG: 80 TABLET, CHEWABLE ORAL at 18:19

## 2021-03-13 RX ADMIN — FUROSEMIDE 40 MG: 10 INJECTION, SOLUTION INTRAMUSCULAR; INTRAVENOUS at 18:19

## 2021-03-13 RX ADMIN — MIDODRINE HYDROCHLORIDE 5 MG: 5 TABLET ORAL at 10:26

## 2021-03-13 RX ADMIN — FUROSEMIDE 40 MG: 10 INJECTION, SOLUTION INTRAMUSCULAR; INTRAVENOUS at 10:26

## 2021-03-13 RX ADMIN — SIMETHICONE 80 MG: 80 TABLET, CHEWABLE ORAL at 13:23

## 2021-03-13 RX ADMIN — DIGOXIN 125 MCG: 125 TABLET ORAL at 18:19

## 2021-03-13 RX ADMIN — ATORVASTATIN CALCIUM 40 MG: 40 TABLET, FILM COATED ORAL at 18:19

## 2021-03-13 RX ADMIN — ZIPRASIDONE HYDROCHLORIDE 60 MG: 60 CAPSULE ORAL at 20:14

## 2021-03-13 RX ADMIN — MIDODRINE HYDROCHLORIDE 5 MG: 5 TABLET ORAL at 08:18

## 2021-03-13 ASSESSMENT — PAIN DESCRIPTION - PAIN TYPE: TYPE: ACUTE PAIN

## 2021-03-13 ASSESSMENT — ENCOUNTER SYMPTOMS
DIZZINESS: 0
NECK PAIN: 0
BACK PAIN: 0
FEVER: 0
DIARRHEA: 0
SINUS PAIN: 0
SORE THROAT: 0
VOMITING: 0
ABDOMINAL PAIN: 0
EYE PAIN: 0
HEADACHES: 0
NAUSEA: 0
FLANK PAIN: 0
CONSTIPATION: 0
DEPRESSION: 1
SHORTNESS OF BREATH: 0
CHILLS: 0
MYALGIAS: 0
BRUISES/BLEEDS EASILY: 0
BLOOD IN STOOL: 0

## 2021-03-13 NOTE — CONSULTS
"PSYCHOLOGICAL FOLLOW-UP:  Reason for admission: Syncope and collapse [R55]  Suicidal ideation [R45.851]  Length of Visit: 60min    Legal status: Legal Status: Involuntary    Chief Complaint: \"I'm ok.\"    HPI: Met with the patient for brief individual psychotherapy. Patient presented with a brighter albeit still depressed affect and reported a \"ok\" mood. He still endorses suicidal thoughts however intent is not as strong. Last night, he did experience significant suicidal thoughts and reached out to his care team to assist in keeping himself safe. Session focused on continuing to cope with health issues, fear of rejection, and self-validation. In particular, the patient shared the emotional ups and downs of the week including feeling confident about his ostomy care and then distressed when someone commented on the smell in his room. Discussed how he started to recognize that he is not responsible or to blame for the actions of others. Explored how to strengthen this skill through self-validation. Also continued to explore what motivates him to continue trying to \"climb out of the hole\" of depression.   Psychiatric Examination:  Vitals: Blood pressure 124/59, pulse 77, temperature 36.8 °C (98.3 °F), temperature source Temporal, resp. rate 16, height 1.829 m (6'), weight (!) 126 kg (278 lb 10.6 oz), SpO2 97 %.  Musculoskeletal: normal psychomotor activity, no tics or unusual mannerisms noted  Appearance and Eye Contact: appropriate dress and grooming. Behavior is calm, cooperative,  appropriate eye contact  Attention/Alertness: Alert  Thought Process: Linear, Logical and Goal Directed    Thought Content: No psychotic processes noted  Speech: Clear with normal rate and rhythm  Mood: \"ok\"            Affect: brighter- still overall depressed         SI/HI: Endorses    Memory: Recent and remote memory appear intact    Orientation: alert, oriented to person, place and time  Insight into symptoms: good  Judgement into " symptoms:good    ASSESSMENT: The patient continues to experience suicidality that is triggered by health issues (that are still active) and painful interpersonal interactions. While he is aware of this cycle, he continues to struggle with not becoming consumed by his thoughts which appears to be exacerbated by his active health problems. Legal hold remains extended.     DSM5 Diagnostic Considerations:   Major Depressive Disorder, recurrent, severe, with psychotic features  Unspecified Anxiety Disorder      PLAN:  Legal status: Involuntary  Anticipate F/U within 72 hours.   Records reviewed: yes  Discussed patient with other provider: yes, team psychiatrist  Will continue to follow  Thank you for the consult.    Madhuri Fernandez, Ph.D.

## 2021-03-13 NOTE — PROGRESS NOTES
Pt is lethargic, oriented x4. Pt denies any suicidal thoughts or plan/intent to harm self. 1:1 sitter present at bedside throughout shift. Cdiff precautions discontinued, results neg. Discussed POC. Nightly medications and assessment completed. Skin care performed, q2 turns. Placed pt on 2L NC for low oxygen saturation this shift.  in place, 96 % on 2L. BP improved this shift. Safety and fall precautions in place. Rounding in place. All needs met at this moment.

## 2021-03-13 NOTE — PROGRESS NOTES
Pt A&Ox4. VS: /60   Pulse 71   Temp 36.5 °C (97.7 °F) (Temporal)   Resp 18   Ht 1.829 m (6')   Wt (!) 126 kg (278 lb 10.6 oz)   SpO2 97%   BMI 37.79 kg/m² . Pt denies pain, n/v, SOB and chest pain. Pt having some SI, with no plan, will notify staff if has one. Pt also having hallucinations at this time. PIv patent with no blood return. Sitter at bedside for legal hold. Condom cath in place to collect urine sample. Pt needs met at this time, call light within reach, hourly rounding in effect, and will continue to monitor.

## 2021-03-13 NOTE — CARE PLAN
Problem: Knowledge Deficit  Goal: Knowledge of disease process/condition, treatment plan, diagnostic tests, and medications will improve  Outcome: PROGRESSING AS EXPECTED  Note: Pt educated regarding plan of care and medications. All questions answered.       Problem: Safety  Goal: Will remain free from injury  Outcome: PROGRESSING AS EXPECTED  Note: 1:1 sitter present at all times. Safety check list in use.

## 2021-03-13 NOTE — PROGRESS NOTES
Updated on call hospitalist--pt WBC has increased from 9.6 to 28.3 and Creatinine from 0.69 to 1.54. Per MD, he will review chart and if patient's condition or vitals change update him.

## 2021-03-13 NOTE — PROGRESS NOTES
Discussed pt's care with Hospitalist. Hospitalist aware of BP. Pt is asymptomatic, has no c/o. In iso now to r/o c-diff. Sample sent. Educated pt concerning isolation, he verbalizes understanding and denies questions. Will pass care to NOC RN @ [facility]

## 2021-03-13 NOTE — CARE PLAN
Problem: Safety  Goal: Will remain free from injury  Note: Hourly rounding in effect, pt instructed to call for assistance, bed locked and in lowest position. Sitter at bedside.      Problem: Knowledge Deficit  Goal: Knowledge of disease process/condition, treatment plan, diagnostic tests, and medications will improve  Note: Pt updated and educated on nursing interventions, medications and POC

## 2021-03-14 PROBLEM — I50.23 ACUTE ON CHRONIC HFREF (HEART FAILURE WITH REDUCED EJECTION FRACTION) (HCC): Status: ACTIVE | Noted: 2021-02-13

## 2021-03-14 PROBLEM — R65.20 SEPSIS WITH ACUTE RENAL FAILURE WITHOUT SEPTIC SHOCK (HCC): Status: ACTIVE | Noted: 2020-11-08

## 2021-03-14 PROBLEM — N17.9 SEPSIS WITH ACUTE RENAL FAILURE WITHOUT SEPTIC SHOCK (HCC): Status: ACTIVE | Noted: 2020-11-08

## 2021-03-14 LAB
ANION GAP SERPL CALC-SCNC: 9 MMOL/L (ref 7–16)
BASOPHILS # BLD AUTO: 0.5 % (ref 0–1.8)
BASOPHILS # BLD: 0.06 K/UL (ref 0–0.12)
BUN SERPL-MCNC: 26 MG/DL (ref 8–22)
CALCIUM SERPL-MCNC: 8.7 MG/DL (ref 8.5–10.5)
CHLORIDE SERPL-SCNC: 99 MMOL/L (ref 96–112)
CO2 SERPL-SCNC: 26 MMOL/L (ref 20–33)
CREAT SERPL-MCNC: 1.21 MG/DL (ref 0.5–1.4)
EOSINOPHIL # BLD AUTO: 0.34 K/UL (ref 0–0.51)
EOSINOPHIL NFR BLD: 2.7 % (ref 0–6.9)
ERYTHROCYTE [DISTWIDTH] IN BLOOD BY AUTOMATED COUNT: 45.1 FL (ref 35.9–50)
GLUCOSE SERPL-MCNC: 126 MG/DL (ref 65–99)
HCT VFR BLD AUTO: 41.3 % (ref 42–52)
HGB BLD-MCNC: 13.6 G/DL (ref 14–18)
IMM GRANULOCYTES # BLD AUTO: 0.17 K/UL (ref 0–0.11)
IMM GRANULOCYTES NFR BLD AUTO: 1.3 % (ref 0–0.9)
LACTATE BLD-SCNC: 2.3 MMOL/L (ref 0.5–2)
LACTATE BLD-SCNC: 2.3 MMOL/L (ref 0.5–2)
LACTATE BLD-SCNC: 2.8 MMOL/L (ref 0.5–2)
LYMPHOCYTES # BLD AUTO: 2.11 K/UL (ref 1–4.8)
LYMPHOCYTES NFR BLD: 16.5 % (ref 22–41)
MAGNESIUM SERPL-MCNC: 1.5 MG/DL (ref 1.5–2.5)
MCH RBC QN AUTO: 28.8 PG (ref 27–33)
MCHC RBC AUTO-ENTMCNC: 32.9 G/DL (ref 33.7–35.3)
MCV RBC AUTO: 87.5 FL (ref 81.4–97.8)
MONOCYTES # BLD AUTO: 1.08 K/UL (ref 0–0.85)
MONOCYTES NFR BLD AUTO: 8.5 % (ref 0–13.4)
NEUTROPHILS # BLD AUTO: 9.02 K/UL (ref 1.82–7.42)
NEUTROPHILS NFR BLD: 70.5 % (ref 44–72)
NRBC # BLD AUTO: 0 K/UL
NRBC BLD-RTO: 0 /100 WBC
PLATELET # BLD AUTO: 249 K/UL (ref 164–446)
PMV BLD AUTO: 10.7 FL (ref 9–12.9)
POTASSIUM SERPL-SCNC: 3.3 MMOL/L (ref 3.6–5.5)
RBC # BLD AUTO: 4.72 M/UL (ref 4.7–6.1)
SODIUM SERPL-SCNC: 134 MMOL/L (ref 135–145)
WBC # BLD AUTO: 12.8 K/UL (ref 4.8–10.8)

## 2021-03-14 PROCEDURE — 700111 HCHG RX REV CODE 636 W/ 250 OVERRIDE (IP): Performed by: STUDENT IN AN ORGANIZED HEALTH CARE EDUCATION/TRAINING PROGRAM

## 2021-03-14 PROCEDURE — 700102 HCHG RX REV CODE 250 W/ 637 OVERRIDE(OP): Performed by: STUDENT IN AN ORGANIZED HEALTH CARE EDUCATION/TRAINING PROGRAM

## 2021-03-14 PROCEDURE — 700105 HCHG RX REV CODE 258: Performed by: STUDENT IN AN ORGANIZED HEALTH CARE EDUCATION/TRAINING PROGRAM

## 2021-03-14 PROCEDURE — 94760 N-INVAS EAR/PLS OXIMETRY 1: CPT

## 2021-03-14 PROCEDURE — 700102 HCHG RX REV CODE 250 W/ 637 OVERRIDE(OP): Performed by: INTERNAL MEDICINE

## 2021-03-14 PROCEDURE — A9270 NON-COVERED ITEM OR SERVICE: HCPCS | Performed by: STUDENT IN AN ORGANIZED HEALTH CARE EDUCATION/TRAINING PROGRAM

## 2021-03-14 PROCEDURE — 302102 BAG OST N IMG 2.25IN 2PC (FECAL): Performed by: STUDENT IN AN ORGANIZED HEALTH CARE EDUCATION/TRAINING PROGRAM

## 2021-03-14 PROCEDURE — 99233 SBSQ HOSP IP/OBS HIGH 50: CPT | Performed by: STUDENT IN AN ORGANIZED HEALTH CARE EDUCATION/TRAINING PROGRAM

## 2021-03-14 PROCEDURE — 700111 HCHG RX REV CODE 636 W/ 250 OVERRIDE (IP): Performed by: HOSPITALIST

## 2021-03-14 PROCEDURE — 36415 COLL VENOUS BLD VENIPUNCTURE: CPT

## 2021-03-14 PROCEDURE — 700102 HCHG RX REV CODE 250 W/ 637 OVERRIDE(OP): Performed by: HOSPITALIST

## 2021-03-14 PROCEDURE — 80048 BASIC METABOLIC PNL TOTAL CA: CPT

## 2021-03-14 PROCEDURE — 700102 HCHG RX REV CODE 250 W/ 637 OVERRIDE(OP): Performed by: PSYCHIATRY & NEUROLOGY

## 2021-03-14 PROCEDURE — 85025 COMPLETE CBC W/AUTO DIFF WBC: CPT

## 2021-03-14 PROCEDURE — 700102 HCHG RX REV CODE 250 W/ 637 OVERRIDE(OP): Performed by: GENERAL PRACTICE

## 2021-03-14 PROCEDURE — A9270 NON-COVERED ITEM OR SERVICE: HCPCS | Performed by: HOSPITALIST

## 2021-03-14 PROCEDURE — A9270 NON-COVERED ITEM OR SERVICE: HCPCS | Performed by: INTERNAL MEDICINE

## 2021-03-14 PROCEDURE — 83735 ASSAY OF MAGNESIUM: CPT

## 2021-03-14 PROCEDURE — A9270 NON-COVERED ITEM OR SERVICE: HCPCS | Performed by: GENERAL PRACTICE

## 2021-03-14 PROCEDURE — 83605 ASSAY OF LACTIC ACID: CPT

## 2021-03-14 PROCEDURE — 99233 SBSQ HOSP IP/OBS HIGH 50: CPT | Performed by: PSYCHIATRY & NEUROLOGY

## 2021-03-14 PROCEDURE — A9270 NON-COVERED ITEM OR SERVICE: HCPCS | Performed by: PSYCHIATRY & NEUROLOGY

## 2021-03-14 PROCEDURE — 770004 HCHG ROOM/CARE - ONCOLOGY PRIVATE *

## 2021-03-14 RX ORDER — LEVOFLOXACIN 5 MG/ML
750 INJECTION, SOLUTION INTRAVENOUS EVERY 24 HOURS
Status: DISCONTINUED | OUTPATIENT
Start: 2021-03-15 | End: 2021-03-14

## 2021-03-14 RX ORDER — DIPHENHYDRAMINE HYDROCHLORIDE 50 MG/ML
25 INJECTION INTRAMUSCULAR; INTRAVENOUS ONCE
Status: COMPLETED | OUTPATIENT
Start: 2021-03-14 | End: 2021-03-14

## 2021-03-14 RX ORDER — MAGNESIUM SULFATE HEPTAHYDRATE 40 MG/ML
2 INJECTION, SOLUTION INTRAVENOUS ONCE
Status: COMPLETED | OUTPATIENT
Start: 2021-03-14 | End: 2021-03-14

## 2021-03-14 RX ORDER — POTASSIUM CHLORIDE 20 MEQ/1
20 TABLET, EXTENDED RELEASE ORAL DAILY
Status: DISCONTINUED | OUTPATIENT
Start: 2021-03-15 | End: 2021-05-28

## 2021-03-14 RX ORDER — POTASSIUM CHLORIDE 20 MEQ/1
40 TABLET, EXTENDED RELEASE ORAL ONCE
Status: COMPLETED | OUTPATIENT
Start: 2021-03-14 | End: 2021-03-14

## 2021-03-14 RX ORDER — CIPROFLOXACIN 2 MG/ML
400 INJECTION, SOLUTION INTRAVENOUS EVERY 12 HOURS
Status: DISCONTINUED | OUTPATIENT
Start: 2021-03-14 | End: 2021-03-15

## 2021-03-14 RX ORDER — FLUDROCORTISONE ACETATE 0.1 MG/1
0.2 TABLET ORAL EVERY MORNING
Status: DISCONTINUED | OUTPATIENT
Start: 2021-03-14 | End: 2021-03-19

## 2021-03-14 RX ADMIN — CLONAZEPAM 0.5 MG: 0.5 TABLET ORAL at 08:28

## 2021-03-14 RX ADMIN — ATORVASTATIN CALCIUM 40 MG: 40 TABLET, FILM COATED ORAL at 17:54

## 2021-03-14 RX ADMIN — CIPROFLOXACIN 400 MG: 2 INJECTION, SOLUTION INTRAVENOUS at 22:02

## 2021-03-14 RX ADMIN — APIXABAN 5 MG: 5 TABLET, FILM COATED ORAL at 20:04

## 2021-03-14 RX ADMIN — SIMETHICONE 80 MG: 80 TABLET, CHEWABLE ORAL at 17:13

## 2021-03-14 RX ADMIN — DOCUSATE SODIUM 50 MG AND SENNOSIDES 8.6 MG 2 TABLET: 8.6; 5 TABLET, FILM COATED ORAL at 08:28

## 2021-03-14 RX ADMIN — APIXABAN 5 MG: 5 TABLET, FILM COATED ORAL at 08:28

## 2021-03-14 RX ADMIN — DIGOXIN 125 MCG: 125 TABLET ORAL at 17:54

## 2021-03-14 RX ADMIN — MIDODRINE HYDROCHLORIDE 10 MG: 5 TABLET ORAL at 08:28

## 2021-03-14 RX ADMIN — SIMETHICONE 80 MG: 80 TABLET, CHEWABLE ORAL at 13:30

## 2021-03-14 RX ADMIN — MIDODRINE HYDROCHLORIDE 10 MG: 5 TABLET ORAL at 13:30

## 2021-03-14 RX ADMIN — CLONAZEPAM 1 MG: 1 TABLET ORAL at 17:54

## 2021-03-14 RX ADMIN — ONDANSETRON 4 MG: 2 INJECTION INTRAMUSCULAR; INTRAVENOUS at 09:05

## 2021-03-14 RX ADMIN — DIPHENHYDRAMINE HYDROCHLORIDE 25 MG: 50 INJECTION INTRAMUSCULAR; INTRAVENOUS at 20:01

## 2021-03-14 RX ADMIN — PAROXETINE HYDROCHLORIDE 20 MG: 20 TABLET, FILM COATED ORAL at 08:28

## 2021-03-14 RX ADMIN — PIPERACILLIN AND TAZOBACTAM 4.5 G: 4; .5 INJECTION, POWDER, LYOPHILIZED, FOR SOLUTION INTRAVENOUS; PARENTERAL at 00:47

## 2021-03-14 RX ADMIN — POTASSIUM CHLORIDE 40 MEQ: 1500 TABLET, EXTENDED RELEASE ORAL at 08:35

## 2021-03-14 RX ADMIN — DOCUSATE SODIUM 50 MG AND SENNOSIDES 8.6 MG 2 TABLET: 8.6; 5 TABLET, FILM COATED ORAL at 20:04

## 2021-03-14 RX ADMIN — ONDANSETRON 4 MG: 4 TABLET, ORALLY DISINTEGRATING ORAL at 20:03

## 2021-03-14 RX ADMIN — FUROSEMIDE 40 MG: 10 INJECTION, SOLUTION INTRAMUSCULAR; INTRAVENOUS at 05:11

## 2021-03-14 RX ADMIN — SIMETHICONE 80 MG: 80 TABLET, CHEWABLE ORAL at 05:12

## 2021-03-14 RX ADMIN — LEVOTHYROXINE SODIUM 112 MCG: 0.11 TABLET ORAL at 05:12

## 2021-03-14 RX ADMIN — MIDODRINE HYDROCHLORIDE 10 MG: 5 TABLET ORAL at 17:54

## 2021-03-14 RX ADMIN — DIPHENHYDRAMINE HYDROCHLORIDE 25 MG: 50 INJECTION INTRAMUSCULAR; INTRAVENOUS at 20:20

## 2021-03-14 RX ADMIN — FLUDROCORTISONE ACETATE 0.2 MG: 0.1 TABLET ORAL at 17:54

## 2021-03-14 RX ADMIN — MAGNESIUM SULFATE 2 G: 2 INJECTION INTRAVENOUS at 13:30

## 2021-03-14 RX ADMIN — PIPERACILLIN AND TAZOBACTAM 4.5 G: 4; .5 INJECTION, POWDER, LYOPHILIZED, FOR SOLUTION INTRAVENOUS; PARENTERAL at 08:29

## 2021-03-14 RX ADMIN — METOPROLOL SUCCINATE 25 MG: 25 TABLET, EXTENDED RELEASE ORAL at 08:35

## 2021-03-14 RX ADMIN — PIPERACILLIN AND TAZOBACTAM 4.5 G: 4; .5 INJECTION, POWDER, LYOPHILIZED, FOR SOLUTION INTRAVENOUS; PARENTERAL at 17:13

## 2021-03-14 RX ADMIN — ZIPRASIDONE HYDROCHLORIDE 60 MG: 60 CAPSULE ORAL at 08:28

## 2021-03-14 RX ADMIN — ONDANSETRON 4 MG: 2 INJECTION INTRAMUSCULAR; INTRAVENOUS at 17:13

## 2021-03-14 ASSESSMENT — ENCOUNTER SYMPTOMS
BLOOD IN STOOL: 0
BRUISES/BLEEDS EASILY: 0
BACK PAIN: 0
ABDOMINAL PAIN: 0
CONSTIPATION: 0
MYALGIAS: 0
SHORTNESS OF BREATH: 0
SORE THROAT: 0
NERVOUS/ANXIOUS: 0
DIARRHEA: 1
EYE PAIN: 0
FEVER: 0
DIZZINESS: 0
DEPRESSION: 1
WEAKNESS: 0
FLANK PAIN: 0
FALLS: 0
CHILLS: 0
NAUSEA: 1
SINUS PAIN: 0
VOMITING: 1
NECK PAIN: 0
HEADACHES: 0

## 2021-03-14 ASSESSMENT — COGNITIVE AND FUNCTIONAL STATUS - GENERAL
PERSONAL GROOMING: A LOT
WALKING IN HOSPITAL ROOM: A LOT
MOVING FROM LYING ON BACK TO SITTING ON SIDE OF FLAT BED: A LOT
MOBILITY SCORE: 13
HELP NEEDED FOR BATHING: A LOT
TURNING FROM BACK TO SIDE WHILE IN FLAT BAD: A LITTLE
EATING MEALS: A LITTLE
STANDING UP FROM CHAIR USING ARMS: A LOT
SUGGESTED CMS G CODE MODIFIER DAILY ACTIVITY: CL
CLIMB 3 TO 5 STEPS WITH RAILING: A LOT
DRESSING REGULAR UPPER BODY CLOTHING: A LOT
SUGGESTED CMS G CODE MODIFIER MOBILITY: CL
MOVING TO AND FROM BED TO CHAIR: A LOT
TOILETING: A LOT
DRESSING REGULAR LOWER BODY CLOTHING: A LOT
DAILY ACTIVITIY SCORE: 13

## 2021-03-14 NOTE — PROGRESS NOTES
[facility] Medicine Daily Progress Note    Date of Service  3/13/2021    Chief Complaint  56 y.o. male admitted 2/9/2021 with   Chief Complaint   Patient presents with   • Syncope     X1 this morning when standing up to go to bathroom         Hospital Course  Mr. Sebastián Castro is a 56 y.o. male history of atrial fibrillation secondary to hypercoagulable state, diabetes complicated by diabetic neuropathy, congestive heart failure, hyperlipidemia, orthostatic hypotension on fludrocortisone who presented on 2/9/2021 with syncopal episode.  Patient reported dizziness prior to syncope.  On presentation to the emergency room she was hypertensive and bradycardic.  Cardiology recommended stopping all AV sanket blocking agents.  He was also started on midodrine for hypotension.    The patient was started on digoxin and apixaban for atrial fibrillation.  Echocardiogram showed EF of 60%, improved from previous echocardiogram obtained on 11/2020.    Of note patient has history of a spleen injury complicated by perforation of splenic flexure in September 2020.  Status post colostomy and Interiano's pouch in November 2020.  He was at home taking care of his colostomy prior to most recent admission.  However he has become depressed, overwhelmed, and can no longer take care of his colostomy independently.    Psych continues to follow the patient; psychiatric medication as per psych recommendation.      Interval Problem Update  Elevated WBC on overnight labs.  Nocturnist ordered CXR, KUB, UA, and Blood Cultures.  CXR demonstrated pulmonary edema concurrent with elevated BNP.  Midodrine increased to improve BP during diuresis of pulmonary edema.    He feels much better. No further N/V ON nor today. His abdominal pain from yesterday has resolved.  His bloating improved slightly with simethicone.    His mood is much improved after feeling so bad yesterday. Still has intrusive thoughts of suicide.    He denies dyspnea, any pain, N/V,  presyncope, bleeding, F/C, dysuria, hematuria.    BMP demonstrates new SRUTHI.    Consultants/Specialty  Psychiatry - SI  Cardiology - Hypotension and Atrial Fibrillation    Code Status  Full Code    Disposition  To inpatient psychiatry pending improvement in self-care of ostomy (must be independent for placement)    Review of Systems  Review of Systems   Constitutional: Negative for chills and fever.   HENT: Negative for ear pain, nosebleeds, sinus pain and sore throat.    Eyes: Negative for pain.   Respiratory: Negative for shortness of breath.    Cardiovascular: Positive for leg swelling. Negative for chest pain.   Gastrointestinal: Negative for abdominal pain, blood in stool, constipation, diarrhea, melena, nausea and vomiting.   Genitourinary: Negative for dysuria, flank pain and hematuria.   Musculoskeletal: Negative for back pain, joint pain, myalgias and neck pain.   Skin: Negative for rash.   Neurological: Negative for dizziness and headaches.   Endo/Heme/Allergies: Does not bruise/bleed easily.   Psychiatric/Behavioral: Positive for depression and suicidal ideas.   All other systems reviewed and are negative.       Physical Exam  Temp:  [36.5 °C (97.7 °F)-36.9 °C (98.4 °F)] 36.8 °C (98.3 °F)  Pulse:  [59-99] 77  Resp:  [16-18] 16  BP: (102-124)/(59-62) 124/59  SpO2:  [96 %-97 %] 97 %    Physical Exam  Vitals and nursing note reviewed.   Constitutional:       General: He is not in acute distress.     Appearance: He is obese. He is not ill-appearing or diaphoretic.      Interventions: Nasal cannula in place.   HENT:      Head: Normocephalic.      Nose: Nose normal.      Mouth/Throat:      Mouth: Mucous membranes are moist.      Pharynx: Oropharynx is clear.   Eyes:      General: No scleral icterus.     Conjunctiva/sclera: Conjunctivae normal.   Cardiovascular:      Rate and Rhythm: Normal rate and regular rhythm.      Pulses: Normal pulses.      Heart sounds: Normal heart sounds. No murmur. No friction rub. No  gallop.    Pulmonary:      Effort: Pulmonary effort is normal. No respiratory distress.      Breath sounds: Normal breath sounds. No wheezing, rhonchi or rales.   Abdominal:      General: Abdomen is flat. Bowel sounds are normal. There is no distension.      Palpations: Abdomen is soft.      Tenderness: There is no abdominal tenderness. There is no guarding or rebound.   Genitourinary:     Comments: Ostomy site dressed, nonerythematous, nontender.  Ostomy output watery / brown stool, minimal gas.  Musculoskeletal:         General: Normal range of motion.      Cervical back: Neck supple.      Right lower leg: Edema present.      Left lower leg: Edema present.      Comments: 2+ edema to knees   Skin:     General: Skin is warm.      Comments: BLE chronic stasis dermatitis   Neurological:      Mental Status: He is alert.      Cranial Nerves: No cranial nerve deficit.      Motor: No weakness.      Comments: Appropriately conversant   Psychiatric:         Attention and Perception: Attention and perception normal.         Mood and Affect: Affect normal. Mood is depressed.         Speech: Speech normal.         Behavior: Behavior normal. Behavior is cooperative.         Thought Content: Thought content does not include suicidal ideation.         Cognition and Memory: Cognition and memory normal.         Judgment: Judgment normal.         Fluids    Intake/Output Summary (Last 24 hours) at 3/13/2021 1713  Last data filed at 3/13/2021 1000  Gross per 24 hour   Intake --   Output 500 ml   Net -500 ml       Laboratory  Recent Labs     03/13/21  0001   WBC 28.3*   RBC 4.69*   HEMOGLOBIN 13.3*   HEMATOCRIT 41.0*   MCV 87.4   MCH 28.4   MCHC 32.4*   RDW 45.5   PLATELETCT 254   MPV 10.2     Recent Labs     03/11/21  0011 03/12/21  0005 03/13/21  0001   SODIUM 137 131* 135   POTASSIUM 3.2* 4.0 3.5*   CHLORIDE 105 97 101   CO2 23 22 23   GLUCOSE 131* 153* 117*   BUN 17 14 21   CREATININE 0.82 0.69 1.54*   CALCIUM 8.5 8.8 8.6                    Imaging  OI-DBIUZMA-4 VIEW   Final Result         No significant interval change.      DX-CHEST-LIMITED (1 VIEW)   Final Result         Diffuse interstitial prominence could relate to mild pulmonary edema or atypical infection      WE-IXVUHIS-4 VIEW   Final Result      Increased colonic gas without definite bowel obstruction.      US-RENAL   Final Result      No evidence of hydronephrosis.      Lobulated kidneys bilaterally.      CT-ABDOMEN-PELVIS WITH   Final Result      1.  There is no evidence of small bowel obstruction.   2.  There is a left lower quadrant ostomy.   3.  There is fluid distention of the colon distal to the surgical material in the left mid descending colon extending all the way to the rectum. There is no pneumatosis or free air.   4.  There is cholelithiasis without biliary dilatation.   5.  There has been interval removal of the drainage catheter anterior to the spleen with minimal hypodense area in the anterior spleen again noted. There is no new fluid collection.      IR-US GUIDED PIV   Final Result    Ultrasound-guided PERIPHERAL IV INSERTION performed by    qualified nursing staff as above.      EC-ECHOCARDIOGRAM COMPLETE W/O CONT   Final Result      DX-LUMBAR SPINE-2 OR 3 VIEWS   Final Result      Moderate compression deformity of T11 is new compared to 2018.      Mild wedge deformity of T12 is unchanged.      Degenerative changes including facet arthropathy.      Mild retrolisthesis of L5 on S1 and L3 on L4.              Assessment/Plan  * Suicidal ideation- (present on admission)  Assessment & Plan  Has history of MDD, recurrent with psychotic features with active SI w/ plan.  Psychiatry consulted, continued legal hold  Legal hold until 3/18  To be discharged to psychiatric program when able to self-care for ostomy    Cardiogenic pulmonary edema (HCC)  Assessment & Plan  CXR demonstrates pulmonary vascular congestion / edema  NT-BNP 7828  Due to IV boluses for hypotension and N/V  in setting of HFrEF with recovered EF  Trial IV lasix, though may not tolerate due to hypotension and adrenal insufficiency    Abdominal bloating  Assessment & Plan  Increasing gas output from ostomy  Intermittent episodes of abdominal pain and N/V  CT 3/4 did not demonstrate obstruction nor pneumotosis  KUB 3/12 for recurrent symptoms demonstrated increased gas but no signs of SBO  Simethicone scheduled 80 mg TID  If persistent, will confer with general surgeon about images and discuss empiric treatment for SIBO    Major depressive disorder, recurrent, severe with psychotic features (HCC)- (present on admission)  Assessment & Plan  Psychiatry and Psychology following  Continue paroxetine, ziprasidone, and clonazepam    Colostomy present (HCC)- (present on admission)  Assessment & Plan  History of fall at home Nov 2020 on left side with perforated colon and splenic fluid collection/hematoma s/p segmental colectomy, colostomy, mobilization of the splenic flexure, wound vac, and I&D Nov 2020.   Ostomy care consulted, but he was non-participatory  He is engaging in ostomy care with his bedside RNs, encouraged to be observed but do it independently  Patient must be independently able to care for his ostomy before inpatient psychiatry will accept him  Reversal timing and workup after 6 months per [facility]    Acute kidney injury (HCC)- (present on admission)  Assessment & Plan  Recurrent 3/13  Cr 1.5 in setting of N/V yesterday  Received 1 L NS due to hypotension, now developed AHRF with pulmonary edema  Initiated on lasix IV BID today with increased midodrine    Secondary adrenal insufficiency (HCC)- (present on admission)  Assessment & Plan  Cosyntropin stimulation test reviewed with only a increase in his cortisol going from 14.5 then up to 21.3 after cosyntropin given  Renin 0.1, ACTH 3.4 and slight increase with cosyntropin suggesting secondary AI  Random cortisol 9.9  Needs outpatient endocrinology  follow up.  Florinef held, Na/K and BP stable  Repeat adrenal function after discharge  Repeat AM BMP    Orthostatic hypotension- (present on admission)  Assessment & Plan  Multifactorial including adrenal insufficiency, medications (beta blocker, CCB, tamsulosin), diabetic autonomic dysfunction, and venous insufficiency  EF is noted to be 60%, significant improvement from prior.  Per cardiology, no further cardiac work-up needed  Follow-up with cardiology as an outpatient.  Continue midodrine, PRN IVF    Chest pain- (present on admission)  Assessment & Plan  Resolved  EKG and Troponins not consistent with ACS    Leukocytosis  Assessment & Plan  WBC 28.3, likely reaction to intense N/V all day prior  AFON and vitals stable  Antibiotics deferred for now due to otherwise improving and no apparent infectious etiology  Cdiff negative  UA: Bacteruria and pyuria but asymptomatic  CXR with pulmonary edema but no infiltrates  BCx and UCx obtained 3/13      Normal anion gap metabolic acidosis  Assessment & Plan  Resolved  Due to bicarbonate loss via ostomy  Repeat AM BMP    Syncope due to orthostatic hypotension- (present on admission)  Assessment & Plan  Likely secondary to orthostatic hypotension  Echo normal with 60% ejection fraction, improved from prior    Paroxysmal atrial fibrillation (HCC)- (present on admission)  Assessment & Plan  CHADSVASC 3 (HTN, CHF, DM).   TTE Feb 2021: EF 60%  Continue metoprolol, digoxin, and eliquis  Discontinued telemetry    Chronic HFrEF (heart failure with reduced ejection fraction) (HCC)- (present on admission)  Assessment & Plan  Recovered EF 60% on repeat echo.  Peripheral edema likely due to venous stasis.  I/Os, daily weight   No fluid restriction  Continue metoprolol XL    Diabetes mellitus type 2, insulin dependent (HCC)- (present on admission)  Assessment & Plan  A1c 5.2  Diabetic diet  No indication for strict BG control  SSI and accuchecks discontinued  Continue  atorvastatin    Anxiety and depression- (present on admission)  Assessment & Plan  On Legal Hold until 3/18  Psychiatry consulted  Continue paroxetine, ziprasidone, and clonazepam  Would likely benefit from CBT or DBT    Hypothyroidism- (present on admission)  Assessment & Plan  TSH wnl Feb 2021  Continue home Levothyroxine    Debility- (present on admission)  Assessment & Plan  Likely due to depression  PT/OT evaluated, no further skilled needs    Essential hypertension- (present on admission)  Assessment & Plan  Resolved, has been hypotensive this admission in setting of secondary adrenal insufficiency  Continue midodrine    Lower limb amputation, great toe (HCC)- (present on admission)  Assessment & Plan  s/p right great toe amputation 2018    Hypomagnesemia  Assessment & Plan  Resolved  Repeat AM BMP/Mg    Hypokalemia  Assessment & Plan  Resolved  Repeat AM BMP/Mg    Hyponatremia  Assessment & Plan  Resolved with IVF yesterday  Repeat AM BMP    Elevated troponin- (present on admission)  Assessment & Plan  RESOLVED  Stable. Suspect due to demand ischemia. No need to trend further.    Chronic venous insufficiency of lower extremity- (present on admission)  Assessment & Plan  Compression stockings    Nonischemic cardiomyopathy (HCC)- (present on admission)  Assessment & Plan  Normal coronaries, 2018.  Tachycardia mediated per cardiology  LVEF improved, based on echo 2/10/2021 LVEF 60%    Mixed hyperlipidemia- (present on admission)  Assessment & Plan  Continue atorvastatin    Obesity- (present on admission)  Assessment & Plan  Body mass index is 37.79 kg/m².  Counseled about diet and exercise       VTE prophylaxis:  Eliquis

## 2021-03-14 NOTE — PROGRESS NOTES
Pharmacy Pharmacotherapy Consult for LOS >30 days    Admit Date: 2/9/2021      Medications were reviewed for appropriateness and ongoing need.     Current Facility-Administered Medications   Medication Dose Route Frequency Provider Last Rate Last Admin   • magnesium sulfate IVPB premix 2 g  2 g Intravenous Once Trent Decker M.D. 25 mL/hr at 03/14/21 1330 2 g at 03/14/21 1330   • [START ON 3/15/2021] potassium chloride SA (Kdur) tablet 20 mEq  20 mEq Oral DAILY Trent Decker M.D.       • furosemide (LASIX) injection 40 mg  40 mg Intravenous BID DIURETIC Trent Decker M.D.   40 mg at 03/14/21 0511   • midodrine (PROAMATINE) tablet 10 mg  10 mg Oral TID WITH MEALS Trent Decker M.D.   10 mg at 03/14/21 1330   • diphenhydrAMINE (BENADRYL) injection 25 mg  25 mg Intravenous Q6HRS PRN Trent Decker M.D.       • piperacillin-tazobactam (ZOSYN) 4.5 g in  mL IVPB  4.5 g Intravenous Q8HRS Trent Decker M.D.   Stopped at 03/14/21 1229   • simethicone (MYLICON) chewable tab 80 mg  80 mg Oral TID Trent Decker M.D.   80 mg at 03/14/21 1330   • PARoxetine (PAXIL) tablet 20 mg  20 mg Oral DAILY Trent Decker M.D.   20 mg at 03/14/21 0828   • metoprolol SR (TOPROL XL) tablet 25 mg  25 mg Oral Q DAY Trent Decker M.D.   25 mg at 03/14/21 0835   • clonazePAM (KLONOPIN) tablet 0.5 mg  0.5 mg Oral DAILY Trent Decker M.D.   0.5 mg at 03/14/21 0828   • clonazePAM (KLONOPIN) tablet 1 mg  1 mg Oral Q EVENING Trent Decker M.D.   1 mg at 03/13/21 1819   • hyoscyamine-maalox plus-lidocaine viscous (GI COCKTAIL) oral susp 15 mL  15 mL Oral Q6HRS PRN Trent Decker M.D.       • ziprasidone (GEODON) capsule 60 mg  60 mg Oral BID Trent Decker M.D.   60 mg at 03/14/21 0828   • hydrOXYzine HCl (ATARAX) tablet 25 mg  25 mg Oral TID PRN Trent Decker M.D.   25 mg at 03/05/21 2135   • apixaban (ELIQUIS) tablet 5 mg  5 mg Oral BID Trent Decker M.D.   5 mg at 03/14/21 0828   • acetaminophen (Tylenol) tablet 650 mg  650 mg Oral  Q4HRS PRN Trent Decker M.D.   650 mg at 03/10/21 1351   • digoxin (LANOXIN) tablet 125 mcg  125 mcg Oral DAILY AT 1800 Trent Decker M.D.   125 mcg at 03/13/21 1819   • senna-docusate (PERICOLACE or SENOKOT S) 8.6-50 MG per tablet 2 Tab  2 tablet Oral BID Carmel Phillips M.D.   2 tablet at 03/14/21 0828    And   • polyethylene glycol/lytes (MIRALAX) PACKET 1 Packet  1 Packet Oral QDAY PRN Carmel Phillips M.D.        And   • magnesium hydroxide (MILK OF MAGNESIA) suspension 30 mL  30 mL Oral QDAY PRN Carmel Phillips M.D.        And   • bisacodyl (DULCOLAX) suppository 10 mg  10 mg Rectal QDAY PRBENI Phillips M.D.       • ondansetron (ZOFRAN) syringe/vial injection 4 mg  4 mg Intravenous Q4HRS PRN Carmel Phillips M.D.   4 mg at 03/14/21 0905   • ondansetron (ZOFRAN ODT) dispertab 4 mg  4 mg Oral Q4HRS PRN Carmel Phillips M.D.   4 mg at 03/04/21 0425   • promethazine (PHENERGAN) tablet 12.5-25 mg  12.5-25 mg Oral Q4HRS TRACI Phillips M.D.   25 mg at 03/04/21 0636   • promethazine (PHENERGAN) suppository 12.5-25 mg  12.5-25 mg Rectal Q4HRS PRN Carmel Phillips M.D.       • prochlorperazine (COMPAZINE) injection 5-10 mg  5-10 mg Intravenous Q4HRS TRACI Phillips M.D.   10 mg at 03/04/21 0800   • atorvastatin (LIPITOR) tablet 40 mg  40 mg Oral Q EVENING Carmel Phillips M.D.   40 mg at 03/13/21 1819   • levothyroxine (SYNTHROID) tablet 112 mcg  112 mcg Oral AM ES Carmel Phillips M.D.   112 mcg at 03/14/21 0512     Recommendations:  1. All medications appear appropriate; there are no recommendations at this time.     Sheron Matias, PharmD, BCOP

## 2021-03-14 NOTE — PROGRESS NOTES
"Hospital Medicine Daily Progress Note    Date of Service  3/14/2021    Chief Complaint  56 y.o. male admitted 2/9/2021 with   Chief Complaint   Patient presents with   • Syncope     X1 this morning when standing up to go to bathroom         Hospital Course  Mr. Sebastián Castro is a 56 y.o. male history of atrial fibrillation secondary to hypercoagulable state, diabetes complicated by diabetic neuropathy, congestive heart failure, hyperlipidemia, orthostatic hypotension on fludrocortisone who presented on 2/9/2021 with syncopal episode.  Patient reported dizziness prior to syncope.  On presentation to the emergency room she was hypertensive and bradycardic.  Cardiology recommended stopping all AV sanket blocking agents.  He was also started on midodrine for hypotension.    The patient was started on digoxin and apixaban for atrial fibrillation.  Echocardiogram showed EF of 60%, improved from previous echocardiogram obtained on 11/2020.    Of note patient has history of a spleen injury complicated by perforation of splenic flexure in September 2020.  Status post colostomy and Interiano's pouch in November 2020.  He was at home taking care of his colostomy prior to most recent admission.  However he has become depressed, overwhelmed, and can no longer take care of his colostomy independently.    Psych continues to follow the patient; psychiatric medication as per psych recommendation.      Interval Problem Update  Blood culture positive for GNR bacteremia. Initiated on zosyn.  He reports N/V again this morning without hematemesis.  He felt febrile but did not have an objective fever measured.  Ostomy output is loose and increased gas.  He has urinated frequently with lasix but denies dysuria, hematuria.  He denies any bleeding, pain, dyspnea, presyncope, weakness, falls.    He reports significant depression worsening after some staff member complained about the smell of his ostomy bag. He felt bad enough that he \"would hang " "himself ifhe could.\"  I acknowledged that this was unkind and unprofessional behavior.     I discussed this concern with charge RN Emperatriz, who will discuss with staff during staff changes to remember to be kind and respectful, recognize that he has complex and emotionally overwhelming medical conditions, and to avoid the stigma associated with SI.    Consultants/Specialty  Psychiatry - SI  Cardiology - Hypotension and Atrial Fibrillation    Code Status  Full Code    Disposition  To inpatient psychiatry pending improvement in self-care of ostomy (must be independent for placement)    Review of Systems  Review of Systems   Constitutional: Negative for chills and fever.   HENT: Negative for ear pain, nosebleeds, sinus pain and sore throat.    Eyes: Negative for pain.   Respiratory: Negative for shortness of breath.    Cardiovascular: Positive for leg swelling. Negative for chest pain.   Gastrointestinal: Positive for diarrhea, nausea and vomiting. Negative for abdominal pain, blood in stool, constipation and melena.   Genitourinary: Negative for dysuria, flank pain and hematuria.   Musculoskeletal: Negative for back pain, falls, joint pain, myalgias and neck pain.   Skin: Negative for rash.   Neurological: Negative for dizziness, weakness and headaches.   Endo/Heme/Allergies: Does not bruise/bleed easily.   Psychiatric/Behavioral: Positive for depression and suicidal ideas. The patient is not nervous/anxious.    All other systems reviewed and are negative.       Physical Exam  Temp:  [36.6 °C (97.8 °F)-37.4 °C (99.4 °F)] 37.4 °C (99.4 °F)  Pulse:  [] 104  Resp:  [16-19] 19  BP: (105-124)/(48-59) 120/59  SpO2:  [90 %-98 %] 98 %    Physical Exam  Vitals and nursing note reviewed.   Constitutional:       General: He is not in acute distress.     Appearance: He is obese. He is ill-appearing (Acutely) and diaphoretic.      Interventions: Nasal cannula in place.   HENT:      Head: Normocephalic.      Nose: Nose normal. "      Mouth/Throat:      Mouth: Mucous membranes are moist.      Pharynx: Oropharynx is clear.   Eyes:      General: No scleral icterus.     Conjunctiva/sclera: Conjunctivae normal.   Cardiovascular:      Rate and Rhythm: Normal rate and regular rhythm.      Pulses: Normal pulses.      Heart sounds: Normal heart sounds. No murmur. No friction rub. No gallop.    Pulmonary:      Effort: Pulmonary effort is normal. No respiratory distress.      Breath sounds: Normal breath sounds. No wheezing, rhonchi or rales.   Abdominal:      General: Abdomen is flat. Bowel sounds are normal. There is no distension.      Palpations: Abdomen is soft.      Tenderness: There is no abdominal tenderness. There is no guarding or rebound.   Genitourinary:     Comments: Ostomy site dressed, nonerythematous, nontender.  Ostomy output watery / brown stool, tense gas in ostomy bag.  Musculoskeletal:         General: Normal range of motion.      Cervical back: Neck supple.      Right lower leg: Edema present.      Left lower leg: Edema present.      Comments: 2+ edema to knees   Skin:     General: Skin is warm.      Comments: BLE chronic stasis dermatitis   Neurological:      Mental Status: He is alert.      Cranial Nerves: No cranial nerve deficit.      Motor: No weakness.      Comments: Appropriately conversant   Psychiatric:         Attention and Perception: Attention and perception normal.         Mood and Affect: Affect normal. Mood is depressed.         Speech: Speech normal.         Behavior: Behavior normal. Behavior is cooperative.         Thought Content: Thought content does not include suicidal ideation.         Cognition and Memory: Cognition and memory normal.         Judgment: Judgment normal.         Fluids    Intake/Output Summary (Last 24 hours) at 3/14/2021 1432  Last data filed at 3/14/2021 1029  Gross per 24 hour   Intake 120 ml   Output 1900 ml   Net -1780 ml       Laboratory  Recent Labs     03/13/21  0001 03/14/21  0006    WBC 28.3* 12.8*   RBC 4.69* 4.72   HEMOGLOBIN 13.3* 13.6*   HEMATOCRIT 41.0* 41.3*   MCV 87.4 87.5   MCH 28.4 28.8   MCHC 32.4* 32.9*   RDW 45.5 45.1   PLATELETCT 254 249   MPV 10.2 10.7     Recent Labs     03/12/21  0005 03/13/21  0001 03/14/21  0006   SODIUM 131* 135 134*   POTASSIUM 4.0 3.5* 3.3*   CHLORIDE 97 101 99   CO2 22 23 26   GLUCOSE 153* 117* 126*   BUN 14 21 26*   CREATININE 0.69 1.54* 1.21   CALCIUM 8.8 8.6 8.7                   Imaging  [id number] VIEW   Final Result         No significant interval change.      DX-CHEST-LIMITED (1 VIEW)   Final Result         Diffuse interstitial prominence could relate to mild pulmonary edema or atypical infection      [id number] VIEW   Final Result      Increased colonic gas without definite bowel obstruction.      US-RENAL   Final Result      No evidence of hydronephrosis.      Lobulated kidneys bilaterally.      CT-ABDOMEN-PELVIS WITH   Final Result      1.  There is no evidence of small bowel obstruction.   2.  There is a left lower quadrant ostomy.   3.  There is fluid distention of the colon distal to the surgical material in the left mid descending colon extending all the way to the rectum. There is no pneumatosis or free air.   4.  There is cholelithiasis without biliary dilatation.   5.  There has been interval removal of the drainage catheter anterior to the spleen with minimal hypodense area in the anterior spleen again noted. There is no new fluid collection.      IR-US GUIDED PIV   Final Result    Ultrasound-guided PERIPHERAL IV INSERTION performed by    qualified nursing staff as above.      EC-ECHOCARDIOGRAM COMPLETE W/O CONT   Final Result      DX-LUMBAR SPINE-2 OR 3 VIEWS   Final Result      Moderate compression deformity of T11 is new compared to 2018.      Mild wedge deformity of T12 is unchanged.      Degenerative changes including facet arthropathy.      Mild retrolisthesis of L5 on S1 and L3 on L4.              Assessment/Plan  * Suicidal  ideation- (present on admission)  Assessment & Plan  Has history of MDD, recurrent with psychotic features with active SI w/ plan.  Psychiatry consulted, continued legal hold  Legal hold until 3/18  To be discharged to psychiatric program pending medical clearance and when able to self-care for ostomy    Cardiogenic pulmonary edema (HCC)  Assessment & Plan  CXR demonstrates pulmonary vascular congestion / edema  NT-BNP 7828  Due to IV boluses for hypotension and N/V in setting of HFrEF with recovered EF  IV lasix tolerated with improvement in BP and Cr, discontinued 3/14 for severe sepsis and lactic acidosis    Major depressive disorder, recurrent, severe with psychotic features (HCC)- (present on admission)  Assessment & Plan  Psychiatry and Psychology following  Continue paroxetine, ziprasidone, and clonazepam    Secondary adrenal insufficiency (HCC)- (present on admission)  Assessment & Plan  Cosyntropin stimulation test reviewed with only a increase in his cortisol going from 14.5 then up to 21.3 after cosyntropin given  Renin 0.1, ACTH 3.4 and slight increase with cosyntropin suggesting secondary AI  Random cortisol 9.9  Needs outpatient endocrinology follow up.  Repeat adrenal function after discharge  Repeat AM BMP    Florinef restarted 3/14 at 0.2 mg daily (2x dose) due to severe sepsis from GNR bacteremia in order to prevent adrenal crisis    Sepsis with acute renal failure without septic shock (HCC)  Assessment & Plan  This is Severe Sepsis Not present on admission  SIRS criteria identified on my evaluation include: Tachycardia, with heart rate greater than 90 BPM and Leukocytosis, with WBC greater than 12,000  Source of infection is GNR bacteremia  Clinical indicators of end organ dysfunction include Systolic blood pressure (SBP) <90 mmHg or mean arterial pressure <65 mmHg and Lactate >2 mmol/L (18.0 mg/dL)  Sepsis protocol initiated  Fluid resuscitation not ordered due to pulmonary edema seen on CXR due  to HFrEF  IV antibiotics as appropriate for source of sepsis - Zosyn    BCx x2 (3/13) +GNR in one bottle, awaiting speciation and susceptbilities  UCx added to UA (3/13) that showed pyuria and bacteruria  Cdiff negative (3/12)  Prior microbiology date reviewed, h/o enterococcus and MRSA but currently not positive for GPCs    Reassessment: I have reassessed the patient's hemodynamic status  End organ dysfunction include(s):  Acute kidney failure (SRUTHI)  Cr and BP improved after diuresis yesterday for cardiogenic pulmonary edema  Lactic Acid 2.8, will repeat q4h and trial small NS bolus (500 cc due to pulmonary edema) if not improving.   Additional diuresis if lactic acid worsens with IVF.      Orthostatic hypotension- (present on admission)  Assessment & Plan  Multifactorial including adrenal insufficiency, medications (beta blocker, CCB, tamsulosin), diabetic autonomic dysfunction, and venous insufficiency  EF is noted to be 60%, significant improvement from prior.  Per cardiology, no further cardiac work-up needed  Follow-up with cardiology as an outpatient.  Continue midodrine, PRN IVF    Leukocytosis  Assessment & Plan  Improving, initiated on antibiotics ON for severe sepsis from GNR bacteremia  AFON and vitals stable  BCx: 1 of 2 bottles +GNR  Cdiff negative  UA: Bacteruria and pyuria  UCx: In progress  CXR with pulmonary edema but no infiltrates        Abdominal bloating  Assessment & Plan  Increasing gas output from ostomy  Intermittent episodes of abdominal pain and N/V  CT 3/4 did not demonstrate obstruction nor pneumotosis  KUB 3/12 for recurrent symptoms demonstrated increased gas but no signs of SBO  Simethicone scheduled 80 mg TID  If persistent, will confer with general surgeon about images and discuss empiric treatment for SIBO    Normal anion gap metabolic acidosis  Assessment & Plan  Resolved  Due to bicarbonate loss via ostomy  Repeat AM BMP    Syncope due to orthostatic hypotension- (present on  admission)  Assessment & Plan  Likely secondary to orthostatic hypotension  Echo normal with 60% ejection fraction, improved from prior    Paroxysmal atrial fibrillation (HCC)- (present on admission)  Assessment & Plan  CHADSVASC 3 (HTN, CHF, DM).   TTE Feb 2021: EF 60%  Continue metoprolol, digoxin, and eliquis  Discontinued telemetry    Acute on chronic HFrEF (heart failure with reduced ejection fraction) ([facility])- (present on admission)  Assessment & Plan  Decompensated based on peripheral and pulmonary edema, mild hyponatremia  IV diuresis initiated 3/13 for pulmonary edema, held 3/14 in setting of lactic acidosis from severe sepsis  Recovered EF 60% on repeat echo.  I/Os, daily weight   No fluid restriction  Continue metoprolol XL  Lisinopril held per Cardiology recommendations due to hypotension    Diabetes mellitus type 2, insulin dependent ([facility])- (present on admission)  Assessment & Plan  A1c 5.2  Diabetic diet  No indication for strict BG control  SSI and accuchecks discontinued  Continue atorvastatin    Colostomy present (HCC)- (present on admission)  Assessment & Plan  History of fall at home Nov 2020 on left side with perforated colon and splenic fluid collection/hematoma s/p segmental colectomy, colostomy, mobilization of the splenic flexure, wound vac, and I&D Nov 2020.   Ostomy care consulted, but he was non-participatory  He is engaging in ostomy care with his bedside RNs, encouraged to be observed but do it independently  Patient must be independently able to care for his ostomy before inpatient psychiatry will accept him  Reversal timing and workup after 6 months per [facility]    Anxiety and depression- (present on admission)  Assessment & Plan  On Legal Hold until 3/18  Psychiatry consulted  Continue paroxetine, ziprasidone, and clonazepam  Would likely benefit from CBT or DBT    Hypothyroidism- (present on admission)  Assessment & Plan  TSH wnl Feb 2021  Continue home  Levothyroxine    Acute kidney injury (HCC)- (present on admission)  Assessment & Plan  Cr 1.2, improved with diuresis  Cr 1.5 in setting of N/V 3/13  Received 1 L NS due to hypotension, now developed AHRF with pulmonary edema  Diuresis held, continue midodrine    Debility- (present on admission)  Assessment & Plan  Likely due to depression  PT/OT evaluated, no further skilled needs    Essential hypertension- (present on admission)  Assessment & Plan  Resolved, has been hypotensive this admission in setting of secondary adrenal insufficiency  Continue midodrine    Lower limb amputation, great toe (HCC)- (present on admission)  Assessment & Plan  s/p right great toe amputation 2018    Hypomagnesemia  Assessment & Plan  Borderline Mg 1.5 in setting of IV lasix  Repleting with 2g IV MgSO4 due to hypokalemia  Repeat AM BMP/Mg    Hypokalemia  Assessment & Plan  Recurrent, K 3.3 in setting of IV lasix  Kdur 40 x1 today  Scheduled Kdur 20 daily  Mg 1.5, giving 2g IV MgSO4  Repeat AM BMP/Mg    Hyponatremia  Assessment & Plan  Recurrent, Mild Na 134  In setting of IV lasix, holding lasix due to severe sepsis with lactic acidosis  If lactate not improving will trial small 500 cc boluses of NS  Repeat AM BMP    Chronic venous insufficiency of lower extremity- (present on admission)  Assessment & Plan  Compression stockings    Elevated troponin- (present on admission)  Assessment & Plan  RESOLVED  Stable. Suspect due to demand ischemia. No need to trend further.    Nonischemic cardiomyopathy (HCC)- (present on admission)  Assessment & Plan  Normal coronaries, 2018.  Tachycardia mediated per cardiology  LVEF improved, based on echo 2/10/2021 LVEF 60%    Chest pain- (present on admission)  Assessment & Plan  Resolved  EKG and Troponins not consistent with ACS    Mixed hyperlipidemia- (present on admission)  Assessment & Plan  Continue atorvastatin    Obesity- (present on admission)  Assessment & Plan  Body mass index is 37.79  kg/m².  Counseled about diet and exercise       VTE prophylaxis:  Eliquis

## 2021-03-14 NOTE — CARE PLAN
Problem: Knowledge Deficit  Goal: Knowledge of disease process/condition, treatment plan, diagnostic tests, and medications will improve  Outcome: PROGRESSING AS EXPECTED  Note: Pt educated regarding plan of care and medications. All questions answered.       Problem: Safety  Goal: Will remain free from injury  Outcome: PROGRESSING AS EXPECTED  Note: Legal hold verified; 1 to 1 sitter in place; only plastic utensils with meals; room free from potentially dangerous items; checklist in use.

## 2021-03-14 NOTE — ASSESSMENT & PLAN NOTE
This is Severe Sepsis Not present on admission  SIRS criteria identified on my evaluation include: Tachycardia, with heart rate greater than 90 BPM and Leukocytosis, with WBC greater than 12,000  Source of infection is GNR bacteremia  Clinical indicators of end organ dysfunction include Systolic blood pressure (SBP) <90 mmHg or mean arterial pressure <65 mmHg and Lactate >2 mmol/L (18.0 mg/dL)  Sepsis protocol initiated  Fluid resuscitation not ordered due to pulmonary edema seen on CXR due to HFrEF  IV antibiotics as appropriate for source of sepsis - Zosyn    BCx x2 (3/13) +GNR in one bottle, awaiting speciation and susceptbilities  UCx added to UA (3/13) that showed pyuria and bacteruria  Cdiff negative (3/12)  Prior microbiology date reviewed, h/o enterococcus and MRSA but currently not positive for GPCs    resolved

## 2021-03-14 NOTE — PROGRESS NOTES
-Assumed care of patient.   -Alert, reports ongoing auditory hallucinations. Oriented x4. 1:1 Sitter in place.   -Denies pain. One episode of emesis this morning after taking morning medications. PRN IV zofran given with good relief.   -Leakage noted on ostomy, appliance changed. Now clean dry and intact. Moderate output.  -Condom cath intact with good urine output.   -Call light within reach, all needs met at this time.

## 2021-03-14 NOTE — CONSULTS
"PSYCHIATRIC FOLLOW-UP:(established)  *Reason for admission:  Syncope, hypotension. Being followed for SI.   *Legal Hold Status: on legal hold                 *HPI: ostomy \"blew up again\" which is very distressing and he is flushed. Says he has been running a temp and has an infection \"down there\" pointing to lower abd. Feels like he can't quite clear the medical challenges. States he is SI but he laughing more and still appears to be improved in general in mood, etc.    *Psychiatric Examination:   Vitals:   Vitals          Vitals:     03/11/21 2006 03/12/21 0543 03/12/21 0712 03/12/21 1200   BP: 123/83 136/68 113/65 (!) 86/52   Pulse: 95 (!) 112 76 98   Resp: 16 17 16     Temp: 36.9 °C (98.4 °F) 37 °C (98.6 °F) 37.4 °C (99.4 °F)     TempSrc: Temporal Temporal Temporal     SpO2: 94% 95% 93%     Weight:           Height:              General Appearance:  good eye contact, flushed  Abnormal Movements: none   Gait and Posture: lying in bed  Speech: soft  Thought Process: normal rate  Associations:   linear  Abnormal or Psychotic Thoughts: none  Judgement and Insight: fair  Orientation: grossly intact  Recent and Remote Memory: grossly intact  Attention Span and Concentration: intact  Language:fluent  Fund of Knowledge: not tested  Mood and Affect: seems worried but also smiles more easily than he used to  SI/HI:  suicidal - yes but less overall and homicidal - no    Results for EMELY FLORES (MRN [id number]) as of 3/14/2021 14:45   Ref. Range 3/14/2021 00:06   WBC Latest Ref Range: 4.8 - 10.8 K/uL 12.8 (H)   Results for EMELY FLORES (MRN [id number]) as of 3/14/2021 14:45   Ref. Range 3/13/2021 05:11   Procalcitonin Latest Ref Range: <0.25 ng/mL 37.13 (H)         *ASSESSMENT/RECOMENDATIONS:  1. Major depression recurrent with mood congruent psychosis     - paxil  20 mg   -geodon 60 mg bid for AH  -klonopin 0.5 /1 mg          2. Medical  -ostomy  -HTN  -pafib  -CHF  -DM2  -hypothyroidism  -orthostatic " "hypotension  -SRUTHI  -secondary adrenal insufficiency  -leukocytosis  -non ischemic cardiomyopathy                Legal hold: on legal hold. It has been said he has \"secondary gain\" but unclear what the secondary gain is for being \"dependent\". Pt is now participating in changing his colostomy.      Observation status: 1:1 sitter  Privileges (while on legal hold): unchanged        *Will Follow       "

## 2021-03-14 NOTE — PROGRESS NOTES
Reviewing patient's labs--pt with gram positive rods on preliminary blood culture that was drawn last night. Updated on call hospitalist--she will place orders for antibiotics.

## 2021-03-15 DIAGNOSIS — Z23 NEED FOR VACCINATION: ICD-10-CM

## 2021-03-15 LAB
ALBUMIN SERPL BCP-MCNC: 2.4 G/DL (ref 3.2–4.9)
ALBUMIN/GLOB SERPL: 0.7 G/DL
ALP SERPL-CCNC: 173 U/L (ref 30–99)
ALT SERPL-CCNC: 32 U/L (ref 2–50)
ANION GAP SERPL CALC-SCNC: 11 MMOL/L (ref 7–16)
ANION GAP SERPL CALC-SCNC: 12 MMOL/L (ref 7–16)
AST SERPL-CCNC: 32 U/L (ref 12–45)
BACTERIA BLD CULT: ABNORMAL
BACTERIA BLD CULT: ABNORMAL
BILIRUB SERPL-MCNC: 0.7 MG/DL (ref 0.1–1.5)
BUN SERPL-MCNC: 28 MG/DL (ref 8–22)
BUN SERPL-MCNC: 33 MG/DL (ref 8–22)
CALCIUM SERPL-MCNC: 8.4 MG/DL (ref 8.5–10.5)
CALCIUM SERPL-MCNC: 8.7 MG/DL (ref 8.5–10.5)
CHLORIDE SERPL-SCNC: 91 MMOL/L (ref 96–112)
CHLORIDE SERPL-SCNC: 92 MMOL/L (ref 96–112)
CO2 SERPL-SCNC: 23 MMOL/L (ref 20–33)
CO2 SERPL-SCNC: 24 MMOL/L (ref 20–33)
CREAT SERPL-MCNC: 1.46 MG/DL (ref 0.5–1.4)
CREAT SERPL-MCNC: 1.58 MG/DL (ref 0.5–1.4)
ERYTHROCYTE [DISTWIDTH] IN BLOOD BY AUTOMATED COUNT: 44.2 FL (ref 35.9–50)
GLOBULIN SER CALC-MCNC: 3.5 G/DL (ref 1.9–3.5)
GLUCOSE BLD-MCNC: 134 MG/DL (ref 65–99)
GLUCOSE BLD-MCNC: 143 MG/DL (ref 65–99)
GLUCOSE SERPL-MCNC: 120 MG/DL (ref 65–99)
GLUCOSE SERPL-MCNC: 138 MG/DL (ref 65–99)
HCT VFR BLD AUTO: 45.4 % (ref 42–52)
HGB BLD-MCNC: 15.5 G/DL (ref 14–18)
LACTATE BLD-SCNC: 2.1 MMOL/L (ref 0.5–2)
LACTATE BLD-SCNC: 2.2 MMOL/L (ref 0.5–2)
MAGNESIUM SERPL-MCNC: 1.6 MG/DL (ref 1.5–2.5)
MCH RBC QN AUTO: 29.1 PG (ref 27–33)
MCHC RBC AUTO-ENTMCNC: 34.1 G/DL (ref 33.7–35.3)
MCV RBC AUTO: 85.3 FL (ref 81.4–97.8)
PLATELET # BLD AUTO: 282 K/UL (ref 164–446)
PMV BLD AUTO: 10.2 FL (ref 9–12.9)
POTASSIUM SERPL-SCNC: 3.4 MMOL/L (ref 3.6–5.5)
POTASSIUM SERPL-SCNC: 3.8 MMOL/L (ref 3.6–5.5)
PROT SERPL-MCNC: 5.9 G/DL (ref 6–8.2)
RBC # BLD AUTO: 5.32 M/UL (ref 4.7–6.1)
SIGNIFICANT IND 70042: ABNORMAL
SITE SITE: ABNORMAL
SODIUM SERPL-SCNC: 126 MMOL/L (ref 135–145)
SODIUM SERPL-SCNC: 127 MMOL/L (ref 135–145)
SOURCE SOURCE: ABNORMAL
WBC # BLD AUTO: 15 K/UL (ref 4.8–10.8)

## 2021-03-15 PROCEDURE — 85027 COMPLETE CBC AUTOMATED: CPT

## 2021-03-15 PROCEDURE — 700111 HCHG RX REV CODE 636 W/ 250 OVERRIDE (IP): Performed by: STUDENT IN AN ORGANIZED HEALTH CARE EDUCATION/TRAINING PROGRAM

## 2021-03-15 PROCEDURE — 700105 HCHG RX REV CODE 258: Performed by: STUDENT IN AN ORGANIZED HEALTH CARE EDUCATION/TRAINING PROGRAM

## 2021-03-15 PROCEDURE — 700102 HCHG RX REV CODE 250 W/ 637 OVERRIDE(OP): Performed by: STUDENT IN AN ORGANIZED HEALTH CARE EDUCATION/TRAINING PROGRAM

## 2021-03-15 PROCEDURE — A9270 NON-COVERED ITEM OR SERVICE: HCPCS | Performed by: STUDENT IN AN ORGANIZED HEALTH CARE EDUCATION/TRAINING PROGRAM

## 2021-03-15 PROCEDURE — 700111 HCHG RX REV CODE 636 W/ 250 OVERRIDE (IP): Performed by: HOSPITALIST

## 2021-03-15 PROCEDURE — 99231 SBSQ HOSP IP/OBS SF/LOW 25: CPT | Performed by: PSYCHIATRY & NEUROLOGY

## 2021-03-15 PROCEDURE — 80048 BASIC METABOLIC PNL TOTAL CA: CPT

## 2021-03-15 PROCEDURE — 99233 SBSQ HOSP IP/OBS HIGH 50: CPT | Performed by: STUDENT IN AN ORGANIZED HEALTH CARE EDUCATION/TRAINING PROGRAM

## 2021-03-15 PROCEDURE — A9270 NON-COVERED ITEM OR SERVICE: HCPCS | Performed by: HOSPITALIST

## 2021-03-15 PROCEDURE — 80053 COMPREHEN METABOLIC PANEL: CPT

## 2021-03-15 PROCEDURE — 82962 GLUCOSE BLOOD TEST: CPT

## 2021-03-15 PROCEDURE — 83605 ASSAY OF LACTIC ACID: CPT

## 2021-03-15 PROCEDURE — 700102 HCHG RX REV CODE 250 W/ 637 OVERRIDE(OP): Performed by: HOSPITALIST

## 2021-03-15 PROCEDURE — 36415 COLL VENOUS BLD VENIPUNCTURE: CPT

## 2021-03-15 PROCEDURE — 83735 ASSAY OF MAGNESIUM: CPT

## 2021-03-15 PROCEDURE — 770020 HCHG ROOM/CARE - TELE (206)

## 2021-03-15 RX ORDER — DEXTROSE MONOHYDRATE 25 G/50ML
50 INJECTION, SOLUTION INTRAVENOUS
Status: DISCONTINUED | OUTPATIENT
Start: 2021-03-15 | End: 2021-04-13

## 2021-03-15 RX ORDER — SODIUM CHLORIDE 9 MG/ML
500 INJECTION, SOLUTION INTRAVENOUS ONCE
Status: COMPLETED | OUTPATIENT
Start: 2021-03-15 | End: 2021-03-15

## 2021-03-15 RX ORDER — SULFAMETHOXAZOLE AND TRIMETHOPRIM 800; 160 MG/1; MG/1
1 TABLET ORAL EVERY 12 HOURS
Status: DISCONTINUED | OUTPATIENT
Start: 2021-03-15 | End: 2021-03-16

## 2021-03-15 RX ADMIN — ATORVASTATIN CALCIUM 40 MG: 40 TABLET, FILM COATED ORAL at 17:48

## 2021-03-15 RX ADMIN — APIXABAN 5 MG: 5 TABLET, FILM COATED ORAL at 09:06

## 2021-03-15 RX ADMIN — DIPHENHYDRAMINE HYDROCHLORIDE 25 MG: 50 INJECTION INTRAMUSCULAR; INTRAVENOUS at 02:01

## 2021-03-15 RX ADMIN — CLONAZEPAM 0.5 MG: 0.5 TABLET ORAL at 09:06

## 2021-03-15 RX ADMIN — DIPHENHYDRAMINE HYDROCHLORIDE 25 MG: 50 INJECTION INTRAMUSCULAR; INTRAVENOUS at 10:03

## 2021-03-15 RX ADMIN — POTASSIUM CHLORIDE 20 MEQ: 20 TABLET, EXTENDED RELEASE ORAL at 05:26

## 2021-03-15 RX ADMIN — APIXABAN 5 MG: 5 TABLET, FILM COATED ORAL at 20:53

## 2021-03-15 RX ADMIN — DOCUSATE SODIUM 50 MG AND SENNOSIDES 8.6 MG 2 TABLET: 8.6; 5 TABLET, FILM COATED ORAL at 20:53

## 2021-03-15 RX ADMIN — MIDODRINE HYDROCHLORIDE 10 MG: 5 TABLET ORAL at 09:06

## 2021-03-15 RX ADMIN — FLUDROCORTISONE ACETATE 0.2 MG: 0.1 TABLET ORAL at 05:26

## 2021-03-15 RX ADMIN — LEVOTHYROXINE SODIUM 112 MCG: 0.11 TABLET ORAL at 05:26

## 2021-03-15 RX ADMIN — ONDANSETRON 4 MG: 2 INJECTION INTRAMUSCULAR; INTRAVENOUS at 02:00

## 2021-03-15 RX ADMIN — SIMETHICONE 80 MG: 80 TABLET, CHEWABLE ORAL at 17:47

## 2021-03-15 RX ADMIN — CLONAZEPAM 1 MG: 1 TABLET ORAL at 17:48

## 2021-03-15 RX ADMIN — SULFAMETHOXAZOLE AND TRIMETHOPRIM 1 TABLET: 800; 160 TABLET ORAL at 17:48

## 2021-03-15 RX ADMIN — MIDODRINE HYDROCHLORIDE 10 MG: 5 TABLET ORAL at 17:48

## 2021-03-15 RX ADMIN — ZIPRASIDONE HYDROCHLORIDE 60 MG: 60 CAPSULE ORAL at 09:07

## 2021-03-15 RX ADMIN — ZIPRASIDONE HYDROCHLORIDE 60 MG: 60 CAPSULE ORAL at 20:53

## 2021-03-15 RX ADMIN — HYDROXYZINE HYDROCHLORIDE 25 MG: 25 TABLET, FILM COATED ORAL at 08:02

## 2021-03-15 RX ADMIN — SIMETHICONE 80 MG: 80 TABLET, CHEWABLE ORAL at 13:32

## 2021-03-15 RX ADMIN — CIPROFLOXACIN 400 MG: 2 INJECTION, SOLUTION INTRAVENOUS at 09:58

## 2021-03-15 RX ADMIN — SODIUM CHLORIDE 500 ML: 9 INJECTION, SOLUTION INTRAVENOUS at 08:03

## 2021-03-15 RX ADMIN — DIGOXIN 125 MCG: 125 TABLET ORAL at 17:48

## 2021-03-15 RX ADMIN — MIDODRINE HYDROCHLORIDE 10 MG: 5 TABLET ORAL at 12:23

## 2021-03-15 RX ADMIN — DOCUSATE SODIUM 50 MG AND SENNOSIDES 8.6 MG 2 TABLET: 8.6; 5 TABLET, FILM COATED ORAL at 09:06

## 2021-03-15 RX ADMIN — SIMETHICONE 80 MG: 80 TABLET, CHEWABLE ORAL at 05:26

## 2021-03-15 RX ADMIN — PAROXETINE HYDROCHLORIDE 20 MG: 20 TABLET, FILM COATED ORAL at 09:58

## 2021-03-15 RX ADMIN — METOPROLOL SUCCINATE 25 MG: 25 TABLET, EXTENDED RELEASE ORAL at 09:06

## 2021-03-15 ASSESSMENT — ENCOUNTER SYMPTOMS
MYALGIAS: 0
FALLS: 0
VOMITING: 0
ABDOMINAL PAIN: 1
SHORTNESS OF BREATH: 0
CHILLS: 0
NERVOUS/ANXIOUS: 0
NAUSEA: 1
HALLUCINATIONS: 1
NECK PAIN: 0
NAUSEA: 0
CONSTIPATION: 0
BRUISES/BLEEDS EASILY: 0
EYE PAIN: 0
DEPRESSION: 1
INSOMNIA: 0
BACK PAIN: 0
FLANK PAIN: 0
HEADACHES: 0
FEVER: 0
DIARRHEA: 0
ABDOMINAL PAIN: 0
BLOOD IN STOOL: 0
SORE THROAT: 0
SINUS PAIN: 0

## 2021-03-15 ASSESSMENT — FIBROSIS 4 INDEX: FIB4 SCORE: 1.16

## 2021-03-15 NOTE — CONSULTS
"PSYCHIATRIC FOLLOW-UP:(established)  *Reason for admission:    Syncope, hypotension  *Legal Hold Status on Admission:    On hold        Chart reviewed.         *HPI: Patient reports mood as\" a little bit down\" today.  Reports feeling hopeless and helpless due to his ostomy bag.  Continues to struggle with managing on its own.  States that the suicidal thoughts are still there, however improving.  Stated that he is trying to deal with it by telling himself the auditory hallucinations are not real.  Last heard voices earlier today.  He is sleeping fair, however has a poor appetite.  Denies side effects to current medications.          Medical ROS (as pertinent):     Review of Systems   Constitutional:        Decreased appetite   Respiratory: Negative for shortness of breath.    Cardiovascular: Negative for chest pain.   Gastrointestinal: Positive for abdominal pain and nausea.   Musculoskeletal:        Reports leg pain   Psychiatric/Behavioral: Positive for depression and hallucinations. The patient does not have insomnia.            *Psychiatric Examination:  Vitals:   Vitals:    03/15/21 1611   BP: 119/71   Pulse: 90   Resp: 18   Temp: 37.6 °C (99.7 °F)   SpO2: 90%       General Appearance: Lying in bed, calm and cooperative with fair eye contact, fair hygiene and grooming  Abnormal Movements: None  Gait and Posture: Normal normal  Speech: Normal volume, tone and rhythm  Thought processes:  Associations: Loose association  Abnormal or Psychotic Thoughts: +AH  Judgement and Insight: Fair/fair  Orientation: Grossly oriented  Recent and Remote Memory: Intact  Attention Span and Concentration: Intact  Language: Fluent  Fund of Knowledge: Test  Mood and Affect: \" A little bit down\", dysthymic  SI/HI:+SI           *EKG:        *Labs personally reviewed: CMP, CBC    Assessment: Continues to endorse depression and suicidal thoughts, however improving.      Dx:  MDD, recurrent with congruent psychotic " features    Medical:    Hyponatremia   Leukocytosis  Ostomy   orthostatic hypotension  Syncope  Paroxysmal atrial fibrillation  Chronic heart failure  Diabetes type 2  Hypothyroidism  Essential hypertension  Debility  Chronic venous insufficiency of lower extremity  Colostomy present  Nonischemic cardiomyopathy  Mixed hyperlipidemia  Obesity      Plan:  1- Legal hold: Extended  2- Psychotropic medications: Continue current regimen  3- Please transfer pt to inpatient psychiatric hospital when medically cleared and bed is available  4- Psychiatry will follow up.    Thank you for the consult.       Sitter:  Phone: Yes  Visitors yes yes  Personal belongings: Yes    This note was created using voice recognition software (Dragon). The accuracy of the dictation is limited by the abilities of the software. I have reviewed the note prior to signing. However, error related to voice recognition software and /or scribes may still exist and should be interpreted within the appropriate context.

## 2021-03-15 NOTE — PROGRESS NOTES
Report given to Shirlene RN and pt transferred to T [id number]. Pt belongings, chart, and supplies placed on bed during transfer. Transferring RN given medications from [facility].

## 2021-03-15 NOTE — DISCHARGE PLANNING
Anticipated Discharge Disposition: Inpatient psych.    Action: Pt discussed in IDT rounds. Pt not medically cleared at this time. Pt difficult placement due to active SI and his ostomy. Pt must be completely independent with his ostomy prior to going to an inpatient psych facility.      Barriers to Discharge: Ostomy, SI, complex medical needs.     Plan: HCM will continue to follow and assist as needed.

## 2021-03-15 NOTE — PROGRESS NOTES
[facility] Medicine Daily Progress Note    Date of Service  3/15/2021    Chief Complaint  56 y.o. male admitted 2/9/2021 with   Chief Complaint   Patient presents with   • Syncope     X1 this morning when standing up to go to bathroom         Hospital Course  Mr. Sebastián Castro is a 56 y.o. male history of atrial fibrillation secondary to hypercoagulable state, diabetes complicated by diabetic neuropathy, congestive heart failure, hyperlipidemia, orthostatic hypotension on fludrocortisone who presented on 2/9/2021 with syncopal episode.  Patient reported dizziness prior to syncope.  On presentation to the emergency room she was hypertensive and bradycardic.  Cardiology recommended stopping all AV sanket blocking agents.  He was also started on midodrine for hypotension.    The patient was started on digoxin and apixaban for atrial fibrillation.  Echocardiogram showed EF of 60%, improved from previous echocardiogram obtained on 11/2020.    Of note patient has history of a spleen injury complicated by perforation of splenic flexure in September 2020.  Status post colostomy and Interiano's pouch in November 2020.  He was at home taking care of his colostomy prior to most recent admission.  However he has become depressed, overwhelmed, and can no longer take care of his colostomy independently.    Psych continues to follow the patient; psychiatric medication as per psych recommendation.      Interval Problem Update  Febrile to 38.1 C overnight.   Developed hypersensitivity rash and itching due to zosyn.   Switched to ciprofloxacin and treated with benadryl.  BCx resulted as pan-susceptible Klebsiella pneumo.   Discussed with pharmacist Ashley and decided on bactrim.    He reports that the itching and rash are improving.  Gas is better today than it was yesterday.  Leg swelling feels better.  He denies dyspnea, pain, N/V, chills, dysuria, bowel dysfunction.      Emotionally he is having a hard time from feeling sick.  He  reports that staff has been consistently kind to him since yesterday.  Seen by psychiatrist Dr. Easley yesterday, no changes in medication.    Consultants/Specialty  Psychiatry - SI  Cardiology - Hypotension and Atrial Fibrillation    Code Status  Full Code    Disposition  To inpatient psychiatry pending improvement in sepsis, hypotension, and hypoxic respiratory failure.  Must be independently managing ostomy prior to transfer to psych facility.    Review of Systems  Review of Systems   Constitutional: Negative for chills and fever.   HENT: Negative for ear pain, nosebleeds, sinus pain and sore throat.    Eyes: Negative for pain.   Respiratory: Negative for shortness of breath.    Cardiovascular: Positive for leg swelling. Negative for chest pain.   Gastrointestinal: Negative for abdominal pain, blood in stool, constipation, diarrhea, melena, nausea and vomiting.   Genitourinary: Negative for dysuria, flank pain and hematuria.   Musculoskeletal: Negative for back pain, falls, joint pain, myalgias and neck pain.   Skin: Positive for itching and rash.   Neurological: Negative for headaches.   Endo/Heme/Allergies: Does not bruise/bleed easily.   Psychiatric/Behavioral: Positive for depression. The patient is not nervous/anxious.    All other systems reviewed and are negative.       Physical Exam  Temp:  [36.4 °C (97.5 °F)-38.1 °C (100.6 °F)] 37.4 °C (99.3 °F)  Pulse:  [] 100  Resp:  [18-28] 18  BP: ()/(48-62) 98/51  SpO2:  [90 %-97 %] 94 %    Physical Exam  Vitals and nursing note reviewed.   Constitutional:       General: He is not in acute distress.     Appearance: He is obese. He is ill-appearing (Appears fatigued) and diaphoretic.      Interventions: Nasal cannula in place.   HENT:      Head: Normocephalic.      Nose: Nose normal.      Mouth/Throat:      Mouth: Mucous membranes are moist.      Pharynx: Oropharynx is clear.   Eyes:      General: No scleral icterus.     Conjunctiva/sclera:  Conjunctivae normal.   Cardiovascular:      Rate and Rhythm: Normal rate and regular rhythm.      Pulses: Normal pulses.      Heart sounds: Normal heart sounds. No murmur. No friction rub. No gallop.    Pulmonary:      Effort: Pulmonary effort is normal. No respiratory distress.      Breath sounds: Normal breath sounds. No wheezing, rhonchi or rales.   Abdominal:      General: Abdomen is flat. Bowel sounds are normal. There is no distension.      Palpations: Abdomen is soft.      Tenderness: There is no abdominal tenderness. There is no guarding or rebound.   Genitourinary:     Comments: Ostomy site dressed, nonerythematous, nontender.  Ostomy output watery / brown stool, some gas in bag but not tense.  Musculoskeletal:         General: Normal range of motion.      Cervical back: Neck supple.      Right lower leg: Edema present.      Left lower leg: Edema present.      Comments: 1+ edema to knees   Skin:     General: Skin is warm.      Findings: Erythema (Generalized) present.      Comments: BLE chronic stasis dermatitis   Neurological:      Mental Status: He is alert.      Cranial Nerves: No cranial nerve deficit.      Motor: No weakness.      Comments: Appropriately conversant   Psychiatric:         Attention and Perception: Attention and perception normal.         Mood and Affect: Affect normal. Mood is depressed.         Speech: Speech normal.         Behavior: Behavior normal. Behavior is cooperative.         Thought Content: Thought content does not include suicidal ideation.         Cognition and Memory: Cognition and memory normal.         Judgment: Judgment normal.         Fluids    Intake/Output Summary (Last 24 hours) at 3/15/2021 1304  Last data filed at 3/15/2021 1100  Gross per 24 hour   Intake 200 ml   Output 750 ml   Net -550 ml       Laboratory  Recent Labs     03/13/21  0001 03/14/21  0006 03/15/21  0324   WBC 28.3* 12.8* 15.0*   RBC 4.69* 4.72 5.32   HEMOGLOBIN 13.3* 13.6* 15.5   HEMATOCRIT 41.0*  41.3* 45.4   MCV 87.4 87.5 85.3   MCH 28.4 28.8 29.1   MCHC 32.4* 32.9* 34.1   RDW 45.5 45.1 44.2   PLATELETCT 254 249 282   MPV 10.2 10.7 10.2     Recent Labs     03/13/21  0001 03/14/21  0006 03/15/21  0324   SODIUM 135 134* 127*   POTASSIUM 3.5* 3.3* 3.4*   CHLORIDE 101 99 92*   CO2 23 26 23   GLUCOSE 117* 126* 120*   BUN 21 26* 28*   CREATININE 1.54* 1.21 1.46*   CALCIUM 8.6 8.7 8.4*                   Imaging  IU-GDIHYTR-5 VIEW   Final Result         No significant interval change.      DX-CHEST-LIMITED (1 VIEW)   Final Result         Diffuse interstitial prominence could relate to mild pulmonary edema or atypical infection      UC-CYECMSZ-4 VIEW   Final Result      Increased colonic gas without definite bowel obstruction.      US-RENAL   Final Result      No evidence of hydronephrosis.      Lobulated kidneys bilaterally.      CT-ABDOMEN-PELVIS WITH   Final Result      1.  There is no evidence of small bowel obstruction.   2.  There is a left lower quadrant ostomy.   3.  There is fluid distention of the colon distal to the surgical material in the left mid descending colon extending all the way to the rectum. There is no pneumatosis or free air.   4.  There is cholelithiasis without biliary dilatation.   5.  There has been interval removal of the drainage catheter anterior to the spleen with minimal hypodense area in the anterior spleen again noted. There is no new fluid collection.      IR-US GUIDED PIV   Final Result    Ultrasound-guided PERIPHERAL IV INSERTION performed by    qualified nursing staff as above.      EC-ECHOCARDIOGRAM COMPLETE W/O CONT   Final Result      DX-LUMBAR SPINE-2 OR 3 VIEWS   Final Result      Moderate compression deformity of T11 is new compared to 2018.      Mild wedge deformity of T12 is unchanged.      Degenerative changes including facet arthropathy.      Mild retrolisthesis of L5 on S1 and L3 on L4.              Assessment/Plan  * Suicidal ideation- (present on  admission)  Assessment & Plan  Has history of MDD, recurrent with psychotic features with active SI w/ plan.  Psychiatry consulted, continued legal hold  Legal hold until 3/18  To be discharged to psychiatric program pending medical clearance and when able to self-care for ostomy    Cardiogenic pulmonary edema (HCC)  Assessment & Plan  CXR demonstrates pulmonary vascular congestion / edema  NT-BNP 7828  Due to IV boluses for hypotension and N/V in setting of HFrEF with recovered EF  IV lasix tolerated with improvement in BP and Cr, discontinued 3/14 for severe sepsis and lactic acidosis    Major depressive disorder, recurrent, severe with psychotic features (HCC)- (present on admission)  Assessment & Plan  Psychiatry and Psychology following  Continue paroxetine, ziprasidone, and clonazepam    Secondary adrenal insufficiency (HCC)- (present on admission)  Assessment & Plan  Cosyntropin stimulation test reviewed with only a increase in his cortisol going from 14.5 then up to 21.3 after cosyntropin given  Renin 0.1, ACTH 3.4 and slight increase with cosyntropin suggesting secondary AI  Random cortisol 9.9  Needs outpatient endocrinology follow up.  Repeat adrenal function after discharge  Repeat AM BMP    Florinef restarted 3/14 at 0.2 mg daily (2x dose) due to severe sepsis from GNR bacteremia in order to prevent adrenal crisis    Sepsis with acute renal failure without septic shock (HCC)  Assessment & Plan  This is Severe Sepsis Not present on admission  SIRS criteria identified on my evaluation include: Tachycardia, with heart rate greater than 90 BPM and Leukocytosis, with WBC greater than 12,000  Source of infection is GNR bacteremia  Clinical indicators of end organ dysfunction include Systolic blood pressure (SBP) <90 mmHg or mean arterial pressure <65 mmHg and Lactate >2 mmol/L (18.0 mg/dL)  Sepsis protocol initiated  Fluid resuscitation not ordered due to pulmonary edema seen on CXR due to HFrEF  IV  antibiotics as appropriate for source of sepsis - Zosyn    BCx x2 (3/13) +GNR in one bottle, awaiting speciation and susceptbilities  UCx added to UA (3/13) that showed pyuria and bacteruria  Cdiff negative (3/12)  Prior microbiology date reviewed, h/o enterococcus and MRSA but currently not positive for GPCs    Reassessment: I have reassessed the patient's hemodynamic status  End organ dysfunction include(s):  Acute kidney failure (SRUTHI)  Cr and BP improved after diuresis yesterday for cardiogenic pulmonary edema  Lactic Acid 2.8, improved with holding diuresis      Orthostatic hypotension- (present on admission)  Assessment & Plan  Multifactorial including adrenal insufficiency, medications (beta blocker, CCB, tamsulosin), diabetic autonomic dysfunction, and venous insufficiency  EF is noted to be 60%, significant improvement from prior.  Per cardiology, no further cardiac work-up needed  Follow-up with cardiology as an outpatient.  Continue midodrine, PRN IVF    Leukocytosis  Assessment & Plan  Due to klebsiella bacteremia  BCx: 1 of 2 bottles +Klebsiella  Cdiff negative  UA: Bacteruria and pyuria  UCx: In progress  CXR with pulmonary edema but no infiltrates        Abdominal bloating  Assessment & Plan  Improved  Had increasing gas output from ostomy  Intermittent episodes of abdominal pain and N/V  CT 3/4 did not demonstrate obstruction nor pneumotosis  KUB 3/12 for recurrent symptoms demonstrated increased gas but no signs of SBO  Simethicone scheduled 80 mg TID  If uncontrolled, would confer with general surgeon about further imaging and discuss empiric treatment for SIBO    Normal anion gap metabolic acidosis  Assessment & Plan  Resolved  Due to bicarbonate loss via ostomy  Repeat AM BMP    Syncope due to orthostatic hypotension- (present on admission)  Assessment & Plan  Likely secondary to orthostatic hypotension  Echo normal with 60% ejection fraction, improved from prior    Paroxysmal atrial fibrillation  ([facility])- (present on admission)  Assessment & Plan  CHADSVASC 3 (HTN, CHF, DM).   TTE Feb 2021: EF 60%  Continue metoprolol, digoxin, and eliquis  Discontinued telemetry    Acute on chronic HFrEF (heart failure with reduced ejection fraction) ([facility])- (present on admission)  Assessment & Plan  Decompensated based on peripheral and pulmonary edema, mild hyponatremia  IV diuresis initiated 3/13 for pulmonary edema, held 3/14 in setting of lactic acidosis from severe sepsis  Recovered EF 60% on repeat echo.  I/Os, daily weight   No fluid restriction  Continue metoprolol XL  Lisinopril held per Cardiology recommendations due to hypotension    Diabetes mellitus type 2, insulin dependent ([facility])- (present on admission)  Assessment & Plan  A1c 5.2  Diabetic diet  Restarting accuchecks and SSI due to GNR sepsis  BG goal <200 with bacteremia  Continue atorvastatin    Colostomy present ([facility])- (present on admission)  Assessment & Plan  History of fall at home Nov 2020 on left side with perforated colon and splenic fluid collection/hematoma s/p segmental colectomy, colostomy, mobilization of the splenic flexure, wound vac, and I&D Nov 2020.   Ostomy care consulted, but he was non-participatory  He is engaging in ostomy care with his bedside RNs, encouraged to be observed but do it independently  Patient must be independently able to care for his ostomy before inpatient psychiatry will accept him  Reversal timing and workup after 6 months per [facility]    Anxiety and depression- (present on admission)  Assessment & Plan  On Legal Hold until 3/18  Psychiatry consulted  Continue paroxetine, ziprasidone, and clonazepam  Would likely benefit from CBT or DBT    Hypothyroidism- (present on admission)  Assessment & Plan  TSH wnl Feb 2021  Continue home Levothyroxine    Acute kidney injury (HCC)- (present on admission)  Assessment & Plan  Cr 1.5  Had improved with increased midodrine and diuresis, but worsened this morning in  setting of holding diuresis for hypotension and lactic acidosis  500 cc NS bolus today, repeat BMP this afternoon  Developed AHRF with pulmonary edema from 1 L bolus, judicious IVF  Diuresis held, continue midodrine    Debility- (present on admission)  Assessment & Plan  Likely due to depression  PT/OT evaluated, no further skilled needs    Essential hypertension- (present on admission)  Assessment & Plan  Resolved, has been hypotensive this admission in setting of secondary adrenal insufficiency  Continue midodrine    Lower limb amputation, great toe (HCC)- (present on admission)  Assessment & Plan  s/p right great toe amputation 2018    Hypomagnesemia  Assessment & Plan  Mg WNL today  Repeat AM BMP/Mg    Hypokalemia  Assessment & Plan  K 3.2  Lasix held yesterday due to hypotension and lactic acidosis  Continue Kdur 20 daily  Discussed with pharmacy, no additional K today due to risk of hyperkalemia with bactrim  Mg WNL  Repeat AM BMP/Mg    Hyponatremia  Assessment & Plan  Moderate, Na 127  In setting of IV lasix, holding lasix due to severe sepsis with lactic acidosis  500 cc NS trial today, repeat afternoon BMP  If worsening will get Delmis and Uosm  Repeat AM BMP    Chronic venous insufficiency of lower extremity- (present on admission)  Assessment & Plan  Compression stockings    Elevated troponin- (present on admission)  Assessment & Plan  RESOLVED  Stable. Suspect due to demand ischemia. No need to trend further.    Nonischemic cardiomyopathy (HCC)- (present on admission)  Assessment & Plan  Normal coronaries, 2018.  Tachycardia mediated per cardiology  LVEF improved, based on echo 2/10/2021 LVEF 60%    Chest pain- (present on admission)  Assessment & Plan  Resolved  EKG and Troponins not consistent with ACS    Mixed hyperlipidemia- (present on admission)  Assessment & Plan  Continue atorvastatin    Obesity- (present on admission)  Assessment & Plan  Body mass index is 37.79 kg/m².  Counseled about diet and  exercise       VTE prophylaxis:  Eliquis

## 2021-03-15 NOTE — PROGRESS NOTES
2 RN Skin Check    2 RN skin check complete with ELISA Almanza.   Devices in place: SCDs.  Skin assessed under devices: yes.  Confirmed pressure ulcers found on: n/a.  New potential pressure ulcers noted on n/a. Wound consult placed N/A.  The following interventions in place Pillows, Mepilex, Lotion, Barrier cream and Waffle bed overlay.    Ear are red and blanching.  Elbows are red and blanching.  Ostomy and wound care done on the abdomen.   Sacrum is red and nonblanching.  Heels are flaky and intact.

## 2021-03-15 NOTE — PROGRESS NOTES
Pt transported from R309 to T819. A&Ox4 and denies any pain at this time. Pt connected to the tele and monitor room called to verify tele status. Pt is afib 118. Pt oriented to the room, restroom, call light and TV controls. Pt educated on how to call for assistance for issues or needs, pt verbally acknowledges understanding. Home medications reviwed. All questions and needs met at this time.

## 2021-03-15 NOTE — WOUND TEAM
"[facility]   Inpatient Services   Established Ostomy Management/ troubleshooting  HPI: Reviewed  PMH: Reviewed   SH: Reviewed   Reason for Ostomy nurse consult:  Established colostomy    Subjective: Pt did not respond,     Objective: appliance intact, sitter at bedside, pt on legal hold, pt being transferred to T8    Colostomy 11/10/20 Transverse LUQ (Active)   Wound Image   02/24/21 0900   Stomal Appliance Assessment Changed    Stoma Assessment Clean;Dry;Intact;Red    Stoma Shape Budded Less Than One Inch;Oval    Stoma Size (in) 1.25    Peristomal Assessment Clean;Dry;Intact    Mucocutaneous Junction Intact    Treatment Appliance Changed;Cleansed with water/washcloth;Site care    Peristomal Protectant No Sting Skin Prep;Paste Ring    Stomal Appliance Paste Ring, 2\";2 1/4\" (57mm) CTF    Output (mL) 200 mL    Output Color Yellow    WOUND RN ONLY - Stomal Appliance  Paste Ring, 2\";2 Piece;2 1/4\" (57mm) CTF    Appliance (Pouch) # [id number]    Appliance Brand Daisy    WOUND NURSE ONLY - Time Spent with Patient (mins) 45      Ostomy Appliance (type and size): 2 1/4\" barrier with pouch and Paste Ring,      Interventions and Education: Appliance removed.  Stoma and peristomal skin cleansed with moist. Barrier cut to fit stoma. Confirmed fit.  Paste ring stretched to fit barrier opening. Barrier applied to vj-stomal skin and then barrier adhered to skin with friction. Pouch attached and pouch end closed.     Evaluation:   3/15/21:  Pt being transferred to T8 related to bacteremia and allergic reaction to zosyn overnight. Appliance working appropriately. Skin intact. Will plan to sign off and be available PRN.    3/11/21: Patient formed own formaflex barrier pouch and paste ring, assisted in cleansing. Feeling better and willing to continue to get more hands on. Likes the formaflex because he is actually able to form it to his size. Still has not performed independently.     3/8/21: Minimal participation, " he attempts to do the steps with his eyes closed and minimal effort with his hands. Not sure if he lacks the fine motor movement or just going through the motions due to his expressed hatred of the colostomy. Patient has yet to perform all steps independently.    3/5/21: Pt did not participate in education today. Pt answered questions appropriately, but remained with his eyes closed through the duration of ostomy care. Declined hands on education when offered. Reported he was not feeling good. Formaflex barrier brought in today for pt as these do no require cutting with scissors. Extra barrier left in bag. CT of abd obtained yesterday did not reveal obstruction.    3/1/21: Viable stoma, producing stool. Per CNA report at bedside, appliance was leaking earlier and CNA changed appliance. Patient observed all steps today and participated with hands on care and did well. Discussed with MD on possibility for reversal. Per surgery, reversal of ostomy is an outpatient procedure that he will have to follow-up with in the future after discharge    2/26/21: Pt very down today, reports that psych has not seen him. Pt kept turning head away from ostomy today but did verbalized he will try to do hands on Sunday.     2/24/21: Pt unable to transfer to inpatient psych until he can care for colostomy independently. Ostomy RNs were asked to return for education. Pt extremely depressed today. Pt states he feels like an animal and that the colostomy is messy and smelly and he would rather die then care for it. This RN provided understanding and attempted to explain that colostomy is manageable. 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 Pt had colostomy created on November 10, 2020 by Dr. Desouza. May want to consider contacting surgery for  possible reversal related to pts extreme body dysmorphia (this is pts 2nd admit for SI related to colostomy) and inability for pt to DC due to pts inability/unwillingness to care for colostomy - Discussed with Dr. Ley ([facility] Resident).           Plan: Wound team to continue with teaching every 3-4 days to help teach patient how to care for appliance so that he can discharge to psych.    Anticipated discharge needs: patient would benefit from SNF, outpatient clinic or Home health for follow up care. Patient needs to follow up with Dr. Desouza after discharge to plan for ostomy reversal surgery.

## 2021-03-15 NOTE — PROGRESS NOTES
"Came into patient room for nightly medications and assessment--pt lethargic and extremely red and flushed throughout his body. Pt reported generalized itching and \"red man syndrome\" from previous IV antibiotics. Stopped zosyn antibiotic and flushed PIV, administered IV Benadryl per MAR. Pt with increase in RR-28 and low grade temp of 99.7F, warm to touch. Blood pressure stable. Updated on call hospitalist--new orders placed for ciprofloxacin, d/c zosyn, and give another 25 mg IV benadryl. Placed ice packs on chest and cool wash cloth on patient's forehead, removed blankets. Placed pt on 2L NC for comfort. Frequent rounding and assessments in place.   "

## 2021-03-15 NOTE — CARE PLAN
Problem: Knowledge Deficit  Goal: Knowledge of disease process/condition, treatment plan, diagnostic tests, and medications will improve  Outcome: PROGRESSING AS EXPECTED  Note: Pt educated regarding plan of care and medications. All questions answered.       Problem: Infection  Goal: Will remain free from infection  Outcome: PROGRESSING SLOWER THAN EXPECTED  Note: UTI/Bacteremia. IV antibiotics per MAR. Q4 hour Lactic Acid levels. Closely monitoring labs and vitals.

## 2021-03-15 NOTE — CARE PLAN
Problem: Safety  Goal: Will remain free from injury  Outcome: PROGRESSING AS EXPECTED  Intervention: Provide assistance with mobility  Note: Fall precautions maintained. Pt legal hold and sitter at bedside. Environment assessed and safety measures in place.      Problem: Venous Thromboembolism (VTW)/Deep Vein Thrombosis (DVT) Prevention:  Goal: Patient will participate in Venous Thrombosis (VTE)/Deep Vein Thrombosis (DVT)Prevention Measures  Outcome: PROGRESSING AS EXPECTED  Intervention: Assess and monitor for anticoagulation complications  Note: Eliquis BID and SCDs on. No s/s VTE.

## 2021-03-15 NOTE — PROGRESS NOTES
Pt arrived to unit via gurney at 1215. Pt oriented to room, unit, and plan of care. Tele-monitor placed and monitor room notified. All questions answered at this time. Call light within reach; fall precautions in place.

## 2021-03-16 PROBLEM — N18.30 ACUTE RENAL FAILURE SUPERIMPOSED ON STAGE 3 CHRONIC KIDNEY DISEASE (HCC): Status: ACTIVE | Noted: 2020-11-11

## 2021-03-16 PROBLEM — R78.81 BACTEREMIA: Status: ACTIVE | Noted: 2021-03-13

## 2021-03-16 PROBLEM — R21 RASH: Status: ACTIVE | Noted: 2021-03-16

## 2021-03-16 LAB
ANION GAP SERPL CALC-SCNC: 10 MMOL/L (ref 7–16)
BACTERIA UR CULT: ABNORMAL
BACTERIA UR CULT: ABNORMAL
BUN SERPL-MCNC: 38 MG/DL (ref 8–22)
CALCIUM SERPL-MCNC: 8.7 MG/DL (ref 8.5–10.5)
CHLORIDE SERPL-SCNC: 92 MMOL/L (ref 96–112)
CO2 SERPL-SCNC: 22 MMOL/L (ref 20–33)
CREAT SERPL-MCNC: 1.5 MG/DL (ref 0.5–1.4)
ERYTHROCYTE [DISTWIDTH] IN BLOOD BY AUTOMATED COUNT: 44.7 FL (ref 35.9–50)
GLUCOSE BLD-MCNC: 127 MG/DL (ref 65–99)
GLUCOSE BLD-MCNC: 129 MG/DL (ref 65–99)
GLUCOSE BLD-MCNC: 130 MG/DL (ref 65–99)
GLUCOSE BLD-MCNC: 142 MG/DL (ref 65–99)
GLUCOSE SERPL-MCNC: 135 MG/DL (ref 65–99)
HCT VFR BLD AUTO: 45.9 % (ref 42–52)
HGB BLD-MCNC: 15.3 G/DL (ref 14–18)
MAGNESIUM SERPL-MCNC: 1.9 MG/DL (ref 1.5–2.5)
MAGNESIUM SERPL-MCNC: 2 MG/DL (ref 1.5–2.5)
MCH RBC QN AUTO: 28.5 PG (ref 27–33)
MCHC RBC AUTO-ENTMCNC: 33.3 G/DL (ref 33.7–35.3)
MCV RBC AUTO: 85.5 FL (ref 81.4–97.8)
PHOSPHATE SERPL-MCNC: 3.4 MG/DL (ref 2.5–4.5)
PLATELET # BLD AUTO: 341 K/UL (ref 164–446)
PMV BLD AUTO: 10.4 FL (ref 9–12.9)
POTASSIUM SERPL-SCNC: 3.4 MMOL/L (ref 3.6–5.5)
RBC # BLD AUTO: 5.37 M/UL (ref 4.7–6.1)
SIGNIFICANT IND 70042: ABNORMAL
SITE SITE: ABNORMAL
SODIUM SERPL-SCNC: 124 MMOL/L (ref 135–145)
SOURCE SOURCE: ABNORMAL
WBC # BLD AUTO: 19.9 K/UL (ref 4.8–10.8)

## 2021-03-16 PROCEDURE — A9270 NON-COVERED ITEM OR SERVICE: HCPCS | Performed by: STUDENT IN AN ORGANIZED HEALTH CARE EDUCATION/TRAINING PROGRAM

## 2021-03-16 PROCEDURE — 80048 BASIC METABOLIC PNL TOTAL CA: CPT

## 2021-03-16 PROCEDURE — 700102 HCHG RX REV CODE 250 W/ 637 OVERRIDE(OP): Performed by: STUDENT IN AN ORGANIZED HEALTH CARE EDUCATION/TRAINING PROGRAM

## 2021-03-16 PROCEDURE — 83735 ASSAY OF MAGNESIUM: CPT

## 2021-03-16 PROCEDURE — A9270 NON-COVERED ITEM OR SERVICE: HCPCS | Performed by: HOSPITALIST

## 2021-03-16 PROCEDURE — 84100 ASSAY OF PHOSPHORUS: CPT

## 2021-03-16 PROCEDURE — 87040 BLOOD CULTURE FOR BACTERIA: CPT

## 2021-03-16 PROCEDURE — 700111 HCHG RX REV CODE 636 W/ 250 OVERRIDE (IP): Performed by: FAMILY MEDICINE

## 2021-03-16 PROCEDURE — 85027 COMPLETE CBC AUTOMATED: CPT

## 2021-03-16 PROCEDURE — 36415 COLL VENOUS BLD VENIPUNCTURE: CPT

## 2021-03-16 PROCEDURE — 700102 HCHG RX REV CODE 250 W/ 637 OVERRIDE(OP): Performed by: HOSPITALIST

## 2021-03-16 PROCEDURE — 770020 HCHG ROOM/CARE - TELE (206)

## 2021-03-16 PROCEDURE — 82962 GLUCOSE BLOOD TEST: CPT | Mod: 91

## 2021-03-16 PROCEDURE — 99231 SBSQ HOSP IP/OBS SF/LOW 25: CPT | Performed by: FAMILY MEDICINE

## 2021-03-16 PROCEDURE — 51798 US URINE CAPACITY MEASURE: CPT

## 2021-03-16 RX ORDER — TAMSULOSIN HYDROCHLORIDE 0.4 MG/1
0.4 CAPSULE ORAL DAILY
Status: DISCONTINUED | OUTPATIENT
Start: 2021-03-17 | End: 2021-04-06

## 2021-03-16 RX ORDER — DIPHENHYDRAMINE HYDROCHLORIDE 50 MG/ML
25 INJECTION INTRAMUSCULAR; INTRAVENOUS EVERY 6 HOURS PRN
Status: DISCONTINUED | OUTPATIENT
Start: 2021-03-16 | End: 2021-04-13

## 2021-03-16 RX ORDER — CEFAZOLIN SODIUM 2 G/100ML
2 INJECTION, SOLUTION INTRAVENOUS EVERY 8 HOURS
Status: DISCONTINUED | OUTPATIENT
Start: 2021-03-16 | End: 2021-03-19

## 2021-03-16 RX ADMIN — SIMETHICONE 80 MG: 80 TABLET, CHEWABLE ORAL at 17:45

## 2021-03-16 RX ADMIN — ZIPRASIDONE HYDROCHLORIDE 60 MG: 60 CAPSULE ORAL at 21:26

## 2021-03-16 RX ADMIN — DIGOXIN 125 MCG: 125 TABLET ORAL at 17:02

## 2021-03-16 RX ADMIN — APIXABAN 5 MG: 5 TABLET, FILM COATED ORAL at 08:07

## 2021-03-16 RX ADMIN — MIDODRINE HYDROCHLORIDE 10 MG: 5 TABLET ORAL at 17:02

## 2021-03-16 RX ADMIN — MIDODRINE HYDROCHLORIDE 10 MG: 5 TABLET ORAL at 12:08

## 2021-03-16 RX ADMIN — SIMETHICONE 80 MG: 80 TABLET, CHEWABLE ORAL at 12:09

## 2021-03-16 RX ADMIN — METOPROLOL SUCCINATE 25 MG: 25 TABLET, EXTENDED RELEASE ORAL at 05:19

## 2021-03-16 RX ADMIN — POTASSIUM CHLORIDE 20 MEQ: 20 TABLET, EXTENDED RELEASE ORAL at 05:19

## 2021-03-16 RX ADMIN — FLUDROCORTISONE ACETATE 0.2 MG: 0.1 TABLET ORAL at 05:19

## 2021-03-16 RX ADMIN — DOCUSATE SODIUM 50 MG AND SENNOSIDES 8.6 MG 2 TABLET: 8.6; 5 TABLET, FILM COATED ORAL at 08:07

## 2021-03-16 RX ADMIN — APIXABAN 5 MG: 5 TABLET, FILM COATED ORAL at 21:26

## 2021-03-16 RX ADMIN — LEVOTHYROXINE SODIUM 112 MCG: 0.11 TABLET ORAL at 05:20

## 2021-03-16 RX ADMIN — ZIPRASIDONE HYDROCHLORIDE 60 MG: 60 CAPSULE ORAL at 08:07

## 2021-03-16 RX ADMIN — ATORVASTATIN CALCIUM 40 MG: 40 TABLET, FILM COATED ORAL at 17:02

## 2021-03-16 RX ADMIN — SULFAMETHOXAZOLE AND TRIMETHOPRIM 1 TABLET: 800; 160 TABLET ORAL at 05:20

## 2021-03-16 RX ADMIN — PAROXETINE HYDROCHLORIDE 20 MG: 20 TABLET, FILM COATED ORAL at 08:07

## 2021-03-16 RX ADMIN — MIDODRINE HYDROCHLORIDE 10 MG: 5 TABLET ORAL at 08:07

## 2021-03-16 RX ADMIN — SIMETHICONE 80 MG: 80 TABLET, CHEWABLE ORAL at 05:19

## 2021-03-16 RX ADMIN — CLONAZEPAM 0.5 MG: 0.5 TABLET ORAL at 08:07

## 2021-03-16 RX ADMIN — CLONAZEPAM 1 MG: 1 TABLET ORAL at 17:02

## 2021-03-16 RX ADMIN — DOCUSATE SODIUM 50 MG AND SENNOSIDES 8.6 MG 2 TABLET: 8.6; 5 TABLET, FILM COATED ORAL at 21:26

## 2021-03-16 RX ADMIN — CEFAZOLIN SODIUM 2 G: 2 INJECTION, SOLUTION INTRAVENOUS at 12:26

## 2021-03-16 RX ADMIN — DIPHENHYDRAMINE HYDROCHLORIDE 25 MG: 50 INJECTION INTRAMUSCULAR; INTRAVENOUS at 21:39

## 2021-03-16 RX ADMIN — CEFAZOLIN SODIUM 2 G: 2 INJECTION, SOLUTION INTRAVENOUS at 21:22

## 2021-03-16 ASSESSMENT — ENCOUNTER SYMPTOMS
NERVOUS/ANXIOUS: 0
MYALGIAS: 0
SINUS PAIN: 0
NAUSEA: 0
EYE PAIN: 0
BRUISES/BLEEDS EASILY: 0
DEPRESSION: 1
FALLS: 0
DIARRHEA: 0
BACK PAIN: 0
NECK PAIN: 0
SORE THROAT: 0
FEVER: 0
ABDOMINAL PAIN: 0
HEADACHES: 0
VOMITING: 0
SHORTNESS OF BREATH: 0

## 2021-03-16 ASSESSMENT — FIBROSIS 4 INDEX: FIB4 SCORE: 0.96

## 2021-03-16 ASSESSMENT — PAIN DESCRIPTION - PAIN TYPE: TYPE: ACUTE PAIN

## 2021-03-16 NOTE — HEART FAILURE PROGRAM
Patient was admitted on 2/9/21 and HF was added to problems on 3/14/21.     Looks like patient has been admitted for syncope was hypotensive and bradycardic. It has also come to light that patient is depressed and overwhelmed at taking care of his multiple chronic illnesses at home particularly his Ostomy which was created in November 2020. He is on a legal hold at this time.    From what I can tell given the prolonged admission, patient appears to have experienced an iatrogenic HFpEF, Improved exacerbation.    So, certainly, this is not the primary problem for the admission but we should do appropriate care measures.    Heart failure with preserved ejection fraction (HFpEF) is noted to be an acute diagnosis on this admission. Nursing: please complete the HF Discharge Checklist (pink sheet in hard chart) and have it cosigned by your charge RN before patient leaves the hospital.    Providers: below are all Guideline Directed Medical Therapy (GDMT) indicated for HFpEF. If any can not be prescribed by discharge, please note the clinical reason for each in your discharge summary.    QUICK SUMMARY OF HFpEF MEASURES    -- Anticoagulation for atrial arrhythmia, if applicable  -- Glycemic control for DM + HF, if diabetic  -- Lipid lowering medication for DM + HF, if diabetic  -- Pneumococcal vaccine if not previously received  -- Influenza vaccine if not previously received for the current flu season  -- Smoking cessation counseling documented if applicable    Daily Nurse: please begin to fill out the HF checklist (pink sheet in hard chart) and use it to guide your daily care.    Discharge Nurse: please ensure completeness of the HF checklist (pink sheet in hard chart) and have it co-signed by the charge RN before the patient leaves the hospital.    Thank you, Glenna, Cardio RN Navigator [id number]      DETAILED EXPLANATION OF HF MEASURES:  1. Documentation of LV systolic function (echo or cath) PTA, during this hospitalization,  or plan to assess post discharge or reason for not assessing documented  2. Documentation of fluid intake and urine output every nursing shift  3. 2 hour post diuretic assessment documented 2 hours after diuretic given  4. HF Patient Education using the Living Well With Heart Failure Booklet and Symptom Tracker documented every nursing shift  5. Nutrition consult for diet education  6. Daily weights (one weight documented every 24 hours) on a standing scale unless standing is contraindicated in which case bed scale can be used - have patient write weight on symptom tracker  7. For LVEF less than or equal to 40%, ACE-I, ARNI or ARB prescribed at discharge   8. For LVEF less than or equal to 40%, an Evidence Based Beta Blocker (bisoprolol, carvedilol, toprol xl) must be prescribed at discharge  9. For LVEF less than or equal to 35% aldosterone blockade prescribed at discharge  10. The combination of hydralazine and isosorbide dinitrate is recommended to reduce morbidity and mortality for patients self-described  Americans with NYHA class III-IV HFrEF (EF 40% or less), receiving optimal therapy with ACE inhibitors and beta blockers, unless contraindicated (Class I, ERIK: A).  11. If a HF patient is diabetic or is newly diagnosed with DM: prescribed diabetes treatment at discharge in the form of glycemic control (diet or anti-hyperglycemic medication) or f/u appointment for diabetes management scheduled at discharge.  12. If a HF patient has diabetes: prescribed lipid lowering medication at discharge  13. Documented smoking cessation advice or counseling  14. If a HF patient has a-fib: anticoagulation is prescribed upon discharge or contraindication is documented  15. Screening for and administering immunizations as long as no contraindications: Pneumonia (regardless of age) and Influenza  16. Written discharge instructions include:  ? Daily weights  ? Record weight on tracker  ? Bring tracker to  appointments  ? Call MD for weight gain of 3lb /day or 5lb/week  ? HF medication teaching  ? Low sodium diet  ? Follow up appointment within seven calendar days of d/c must include: date, time and location  ? Activity  ? Worsening symptoms    What if any of the above HF measures are contraindicated?  ? Request that the discharging provider document the medication/intervention and the contraindication specifically in a progress note  ? For example: “no CHF meds due to hypotension” is not enough. It needs to say: “No ACE-I, ARNI, ARB due to hypotension”; “No Beta Blockade due to bradycardia”…

## 2021-03-16 NOTE — CARE PLAN
Problem: Pain Management  Goal: Pain level will decrease to patient's comfort goal  Outcome: PROGRESSING AS EXPECTED     Problem: Communication  Goal: The ability to communicate needs accurately and effectively will improve  Outcome: PROGRESSING SLOWER THAN EXPECTED     Problem: Psychosocial Needs:  Goal: Level of anxiety will decrease  Outcome: PROGRESSING SLOWER THAN EXPECTED

## 2021-03-16 NOTE — DISCHARGE PLANNING
Legal Hold    Referral: Legal Hold Court     Intervention: Pt presented for legal hold meeting with  via video conferencing.  advised pt will meet with court MD's via telemedicine monitor to contest the legal hold.      Plan: Pt will present to telemedicine mental health to meet with court physicians 3/17. Will call bedside RN once time has been determined.

## 2021-03-16 NOTE — PROGRESS NOTES
Bedside report recieved. Patient A&O x 4. POC discussed with pt. Pt verbalized understanding. Call light and belongings within reach. Bedlocked and in lowest position. Alarm and fall percautions in place.

## 2021-03-16 NOTE — PROGRESS NOTES
[facility] Medicine Daily Progress Note    Date of Service  3/16/2021    Chief Complaint  56 y.o. male admitted 2/9/2021 with   Chief Complaint   Patient presents with   • Syncope     X1 this morning when standing up to go to bathroom         Hospital Course  Mr. Sebastián Castro is a 56 y.o. male history of atrial fibrillation secondary to hypercoagulable state, diabetes complicated by diabetic neuropathy, congestive heart failure, hyperlipidemia, orthostatic hypotension on fludrocortisone who presented on 2/9/2021 with syncopal episode.  Patient reported dizziness prior to syncope.  On presentation to the emergency room she was hypertensive and bradycardic.  Cardiology recommended stopping all AV sanket blocking agents.  He was also started on midodrine for hypotension.    The patient was started on digoxin and apixaban for atrial fibrillation.  Echocardiogram showed EF of 60%, improved from previous echocardiogram obtained on 11/2020.    Of note patient has history of a spleen injury complicated by perforation of splenic flexure in September 2020.  Status post colostomy and Interiano's pouch in November 2020.  He was at home taking care of his colostomy prior to most recent admission.  However he has become depressed, overwhelmed, and can no longer take care of his colostomy independently.    Psych continues to follow the patient; psychiatric medication as per psych recommendation.      Interval Problem Update  Febrile to 38.1 C overnight.   Developed hypersensitivity rash and itching due to zosyn.   Switched to ciprofloxacin and treated with benadryl.  BCx resulted as pan-susceptible Klebsiella pneumo.   Discussed with pharmacist Ashley and decided on bactrim.    He reports that the itching and rash are improving.  Gas is better today than it was yesterday.  Leg swelling feels better.  He denies dyspnea, pain, N/V, chills, dysuria, bowel dysfunction.      Emotionally he is having a hard time from feeling sick.  He  reports that staff has been consistently kind to him since yesterday.  Seen by psychiatrist Dr. Easley yesterday, no changes in medication.  3/16: Resting in bed comfortable.  Pruritic rash noted on upper extremities, chest, and neck.  Afebrile.  Hemodynamically stable.  Saturating well on room air.  Still admits to suicidal ideation.  Sitter at bedside.  No acute distress noted.  No issues overnight per staff.  Consultants/Specialty  Psychiatry - SI  Cardiology - Hypotension and Atrial Fibrillation    Code Status  Full Code    Disposition  To inpatient psychiatry pending improvement in sepsis, hypotension, and hypoxic respiratory failure.  Must be independently managing ostomy prior to transfer to psych facility.    Review of Systems  Review of Systems   Constitutional: Negative for fever.   HENT: Negative for ear pain, nosebleeds, sinus pain and sore throat.    Eyes: Negative for pain.   Respiratory: Negative for shortness of breath.    Cardiovascular: Positive for leg swelling. Negative for chest pain.   Gastrointestinal: Negative for abdominal pain, diarrhea, nausea and vomiting.   Genitourinary: Negative for dysuria and urgency.   Musculoskeletal: Negative for back pain, falls, joint pain, myalgias and neck pain.   Skin: Positive for itching and rash.   Neurological: Negative for headaches.   Endo/Heme/Allergies: Does not bruise/bleed easily.   Psychiatric/Behavioral: Positive for depression. The patient is not nervous/anxious.         Physical Exam  Temp:  [36.7 °C (98 °F)-37.6 °C (99.7 °F)] 37.4 °C (99.3 °F)  Pulse:  [64-99] 70  Resp:  [18-20] 18  BP: (102-119)/(49-71) 105/55  SpO2:  [90 %-91 %] 91 %    Physical Exam  Vitals and nursing note reviewed.   Constitutional:       General: He is not in acute distress.     Appearance: He is obese. He is ill-appearing (Appears fatigued) and diaphoretic.      Interventions: Nasal cannula in place.   HENT:      Head: Normocephalic.      Nose: Nose normal.       Mouth/Throat:      Mouth: Mucous membranes are moist.      Pharynx: Oropharynx is clear.   Eyes:      General: No scleral icterus.     Conjunctiva/sclera: Conjunctivae normal.   Cardiovascular:      Rate and Rhythm: Normal rate and regular rhythm.      Pulses: Normal pulses.      Heart sounds: Normal heart sounds. No murmur. No friction rub. No gallop.    Pulmonary:      Effort: Pulmonary effort is normal. No respiratory distress.      Breath sounds: Normal breath sounds. No wheezing, rhonchi or rales.   Abdominal:      General: Abdomen is flat. Bowel sounds are normal. There is no distension.      Palpations: Abdomen is soft.      Tenderness: There is no abdominal tenderness. There is no guarding or rebound.   Genitourinary:     Comments: Ostomy site dressed, nonerythematous, nontender.  Ostomy output watery / brown stool, some gas in bag but not tense.  Musculoskeletal:         General: Normal range of motion.      Cervical back: Neck supple.      Right lower leg: Edema present.      Left lower leg: Edema present.      Comments: 1+ edema to knees   Skin:     General: Skin is warm.      Findings: Erythema (Generalized) present.      Comments: BLE chronic stasis dermatitis   Neurological:      Mental Status: He is alert.      Cranial Nerves: No cranial nerve deficit.      Motor: No weakness.      Comments: Appropriately conversant   Psychiatric:         Attention and Perception: Attention and perception normal.         Mood and Affect: Affect normal. Mood is depressed.         Speech: Speech normal.         Behavior: Behavior normal. Behavior is cooperative.         Thought Content: Thought content does not include suicidal ideation.         Cognition and Memory: Cognition and memory normal.         Judgment: Judgment normal.         Fluids    Intake/Output Summary (Last 24 hours) at 3/16/2021 1408  Last data filed at 3/16/2021 0800  Gross per 24 hour   Intake --   Output 700 ml   Net -700 ml        Laboratory  Recent Labs     03/14/21  0006 03/15/21  0324 03/16/21  0619   WBC 12.8* 15.0* 19.9*   RBC 4.72 5.32 5.37   HEMOGLOBIN 13.6* 15.5 15.3   HEMATOCRIT 41.3* 45.4 45.9   MCV 87.5 85.3 85.5   MCH 28.8 29.1 28.5   MCHC 32.9* 34.1 33.3*   RDW 45.1 44.2 44.7   PLATELETCT 249 282 341   MPV 10.7 10.2 10.4     Recent Labs     03/15/21  0324 03/15/21  1751 03/16/21  0619   SODIUM 127* 126* 124*   POTASSIUM 3.4* 3.8 3.4*   CHLORIDE 92* 91* 92*   CO2 23 24 22   GLUCOSE 120* 138* 135*   BUN 28* 33* 38*   CREATININE 1.46* 1.58* 1.50*   CALCIUM 8.4* 8.7 8.7                   Imaging  [id number] VIEW   Final Result         No significant interval change.      DX-CHEST-LIMITED (1 VIEW)   Final Result         Diffuse interstitial prominence could relate to mild pulmonary edema or atypical infection      [id number] VIEW   Final Result      Increased colonic gas without definite bowel obstruction.      US-RENAL   Final Result      No evidence of hydronephrosis.      Lobulated kidneys bilaterally.      CT-ABDOMEN-PELVIS WITH   Final Result      1.  There is no evidence of small bowel obstruction.   2.  There is a left lower quadrant ostomy.   3.  There is fluid distention of the colon distal to the surgical material in the left mid descending colon extending all the way to the rectum. There is no pneumatosis or free air.   4.  There is cholelithiasis without biliary dilatation.   5.  There has been interval removal of the drainage catheter anterior to the spleen with minimal hypodense area in the anterior spleen again noted. There is no new fluid collection.      IR-US GUIDED PIV   Final Result    Ultrasound-guided PERIPHERAL IV INSERTION performed by    qualified nursing staff as above.      EC-ECHOCARDIOGRAM COMPLETE W/O CONT   Final Result      DX-LUMBAR SPINE-2 OR 3 VIEWS   Final Result      Moderate compression deformity of T11 is new compared to 2018.      Mild wedge deformity of T12 is unchanged.       Degenerative changes including facet arthropathy.      Mild retrolisthesis of L5 on S1 and L3 on L4.              Assessment/Plan  * Suicidal ideation- (present on admission)  Assessment & Plan  Has history of MDD, recurrent with psychotic features with active SI w/ plan.  Psychiatry consulted, continued legal hold  Legal hold until 3/18  To be discharged to psychiatric program pending medical clearance and when able to self-care for ostomy    Cardiogenic pulmonary edema (HCC)  Assessment & Plan  CXR demonstrates pulmonary vascular congestion / edema  NT-BNP 7828  Due to IV boluses for hypotension and N/V in setting of HFrEF with recovered EF  IV lasix tolerated with improvement in BP and Cr, discontinued 3/14 for severe sepsis and lactic acidosis  3/16: Currently saturating well on room air.    Major depressive disorder, recurrent, severe with psychotic features (HCC)- (present on admission)  Assessment & Plan  Psychiatry and Psychology following  Continue paroxetine, ziprasidone, and clonazepam    Secondary adrenal insufficiency (HCC)- (present on admission)  Assessment & Plan  Cosyntropin stimulation test reviewed with only a increase in his cortisol going from 14.5 then up to 21.3 after cosyntropin given  Renin 0.1, ACTH 3.4 and slight increase with cosyntropin suggesting secondary AI  Random cortisol 9.9  Needs outpatient endocrinology follow up.  Repeat adrenal function after discharge  Repeat AM BMP    Florinef restarted 3/14 at 0.2 mg daily (2x dose) due to severe sepsis from GNR bacteremia in order to prevent adrenal crisis    Sepsis with acute renal failure without septic shock (HCC)  Assessment & Plan  This is Severe Sepsis Not present on admission  SIRS criteria identified on my evaluation include: Tachycardia, with heart rate greater than 90 BPM and Leukocytosis, with WBC greater than 12,000  Source of infection is GNR bacteremia  Clinical indicators of end organ dysfunction include Systolic blood  pressure (SBP) <90 mmHg or mean arterial pressure <65 mmHg and Lactate >2 mmol/L (18.0 mg/dL)  Sepsis protocol initiated  Fluid resuscitation not ordered due to pulmonary edema seen on CXR due to HFrEF  IV antibiotics as appropriate for source of sepsis - Zosyn    BCx x2 (3/13) +GNR in one bottle, awaiting speciation and susceptbilities  UCx added to UA (3/13) that showed pyuria and bacteruria  Cdiff negative (3/12)  Prior microbiology date reviewed, h/o enterococcus and MRSA but currently not positive for GPCs    Reassessment: I have reassessed the patient's hemodynamic status  End organ dysfunction include(s):  Acute kidney failure ([facility])  Cr and BP improved after diuresis yesterday for cardiogenic pulmonary edema  Lactic Acid 2.8, improved with holding diuresis      Orthostatic hypotension- (present on admission)  Assessment & Plan  Multifactorial including adrenal insufficiency, medications (beta blocker, CCB, tamsulosin), diabetic autonomic dysfunction, and venous insufficiency  EF is noted to be 60%, significant improvement from prior.  Per cardiology, no further cardiac work-up needed  Follow-up with cardiology as an outpatient.  Continue midodrine, PRN IVF    Bacteremia  Assessment & Plan  Due to klebsiella bacteremia  BCx: 1 of 2 bottles +Klebsiella  Cdiff negative  UA: Bacteruria and pyuria  UCx: In progress  CXR with pulmonary edema but no infiltrates  3/16: Currently on Ancef.        Abdominal bloating  Assessment & Plan  Improved  Had increasing gas output from ostomy  Intermittent episodes of abdominal pain and N/V  CT 3/4 did not demonstrate obstruction nor pneumotosis  KUB 3/12 for recurrent symptoms demonstrated increased gas but no signs of SBO  Simethicone scheduled 80 mg TID  If uncontrolled, would confer with general surgeon about further imaging and discuss empiric treatment for SIBO    Normal anion gap metabolic acidosis  Assessment & Plan  Resolved  Due to bicarbonate loss via ostomy  Repeat  AM BMP    Syncope due to orthostatic hypotension- (present on admission)  Assessment & Plan  Likely secondary to orthostatic hypotension  Echo normal with 60% ejection fraction, improved from prior    Paroxysmal atrial fibrillation (HCC)- (present on admission)  Assessment & Plan  CHADSVASC 3 (HTN, CHF, DM).   TTE Feb 2021: EF 60%  Continue metoprolol, digoxin, and eliquis  Discontinued telemetry    Acute on chronic HFrEF (heart failure with reduced ejection fraction) ([facility])- (present on admission)  Assessment & Plan  Decompensated based on peripheral and pulmonary edema, mild hyponatremia  IV diuresis initiated 3/13 for pulmonary edema, held 3/14 in setting of lactic acidosis from severe sepsis  Recovered EF 60% on repeat echo.  I/Os, daily weight   No fluid restriction  Continue metoprolol XL  Lisinopril held per Cardiology recommendations due to hypotension    Diabetes mellitus type 2, insulin dependent ([facility])- (present on admission)  Assessment & Plan  A1c 5.2  Diabetic diet  Restarting accuchecks and SSI due to GNR sepsis  BG goal <200 with bacteremia  Continue atorvastatin    Colostomy present ([facility])- (present on admission)  Assessment & Plan  History of fall at home Nov 2020 on left side with perforated colon and splenic fluid collection/hematoma s/p segmental colectomy, colostomy, mobilization of the splenic flexure, wound vac, and I&D Nov 2020.   Ostomy care consulted, but he was non-participatory  He is engaging in ostomy care with his bedside RNs, encouraged to be observed but do it independently  Patient must be independently able to care for his ostomy before inpatient psychiatry will accept him  Reversal timing and workup after 6 months per [facility]  3/16: Patient has to perform self ostomy care in order to be transferred to inpatient psychiatric facility.    Anxiety and depression- (present on admission)  Assessment & Plan  On Legal Hold until 3/18  Psychiatry consulted  Continue  paroxetine, ziprasidone, and clonazepam  Would likely benefit from CBT or DBT  3/16: Psychiatry recommending transferring to inpatient psychiatric facility once medically cleared.  However patient has to perform self ostomy care which has been challenging.  Management and  working on best safe discharge plan.    Hypothyroidism- (present on admission)  Assessment & Plan  TSH wnl Feb 2021  Continue home Levothyroxine    Acute renal failure superimposed on stage 3 chronic kidney disease (HCC)- (present on admission)  Assessment & Plan  CKD 3  Had improved with increased midodrine and diuresis, but worsened this morning in setting of holding diuresis for hypotension and lactic acidosis  500 cc NS bolus today, repeat BMP this afternoon  Developed AHRF with pulmonary edema from 1 L bolus, judicious IVF  Diuresis held, continue midodrine  3/16: Discontinue Bactrim as it will worsen kidney functions. Slowly improving.  Avoid nephrotoxins.  Renally dose all medications.    Debility- (present on admission)  Assessment & Plan  Likely due to depression  PT/OT evaluated, no further skilled needs    Essential hypertension- (present on admission)  Assessment & Plan  Resolved, has been hypotensive this admission in setting of secondary adrenal insufficiency  Continue midodrine    Lower limb amputation, great toe (HCC)- (present on admission)  Assessment & Plan  s/p right great toe amputation 2018    Hypomagnesemia  Assessment & Plan  Mg WNL today  Repeat AM BMP/Mg    Hypokalemia  Assessment & Plan  K 3.2  Lasix held yesterday due to hypotension and lactic acidosis  Continue Kdur 20 daily  Discussed with pharmacy, no additional K today due to risk of hyperkalemia with bactrim  Mg WNL  Repeat AM BMP/Mg    Hyponatremia  Assessment & Plan  Moderate, Na 127  In setting of IV lasix, holding lasix due to severe sepsis with lactic acidosis  500 cc NS trial today, repeat afternoon BMP  If worsening will get Delmis and  Uosm  Repeat AM BMP    Rash- (present on admission)  Assessment & Plan  Patient developed pruritic drug related rash   Prn Benadryl     Chronic venous insufficiency of lower extremity- (present on admission)  Assessment & Plan  Compression stockings    Elevated troponin- (present on admission)  Assessment & Plan  RESOLVED  Stable. Suspect due to demand ischemia. No need to trend further.    Nonischemic cardiomyopathy (HCC)- (present on admission)  Assessment & Plan  Normal coronaries, 2018.  Tachycardia mediated per cardiology  LVEF improved, based on echo 2/10/2021 LVEF 60%    Chest pain- (present on admission)  Assessment & Plan  Resolved  EKG and Troponins not consistent with ACS    Mixed hyperlipidemia- (present on admission)  Assessment & Plan  Continue atorvastatin    Obesity- (present on admission)  Assessment & Plan  Body mass index is 37.79 kg/m².  Counseled about diet and exercise       VTE prophylaxis:  Eliquis

## 2021-03-16 NOTE — CARE PLAN
Problem: Communication  Goal: The ability to communicate needs accurately and effectively will improve  Outcome: PROGRESSING AS EXPECTED  Pt whiteboard updated on plan of care  Pt encouraged to call for assistance  Pt encouraged to voice any concerns or questions regarding care plan  Pt updated on plan of care as it develops and changes       Problem: Safety  Goal: Will remain free from injury  Outcome: PROGRESSING AS EXPECTED   Fall precautions in place. Bed in lowest position. Non-skid socks in place. Personal possessions within reach. Mobility sign on door. Bed-alarm on. Call light within reach. Pt educated regarding fall prevention and states understanding.

## 2021-03-17 PROBLEM — N13.9 OBSTRUCTIVE UROPATHY: Status: ACTIVE | Noted: 2021-03-17

## 2021-03-17 PROBLEM — I50.32 CHRONIC HEART FAILURE WITH PRESERVED EJECTION FRACTION (HCC): Status: ACTIVE | Noted: 2021-02-13

## 2021-03-17 LAB
ALBUMIN SERPL BCP-MCNC: 2.3 G/DL (ref 3.2–4.9)
ALBUMIN/GLOB SERPL: 0.7 G/DL
ALP SERPL-CCNC: 156 U/L (ref 30–99)
ALT SERPL-CCNC: 19 U/L (ref 2–50)
ANION GAP SERPL CALC-SCNC: 10 MMOL/L (ref 7–16)
AST SERPL-CCNC: 23 U/L (ref 12–45)
BASOPHILS # BLD AUTO: 0 % (ref 0–1.8)
BASOPHILS # BLD: 0 K/UL (ref 0–0.12)
BILIRUB SERPL-MCNC: 0.3 MG/DL (ref 0.1–1.5)
BUN SERPL-MCNC: 48 MG/DL (ref 8–22)
CALCIUM SERPL-MCNC: 8.7 MG/DL (ref 8.5–10.5)
CHLORIDE SERPL-SCNC: 98 MMOL/L (ref 96–112)
CO2 SERPL-SCNC: 24 MMOL/L (ref 20–33)
CREAT SERPL-MCNC: 1.66 MG/DL (ref 0.5–1.4)
EOSINOPHIL # BLD AUTO: 0.7 K/UL (ref 0–0.51)
EOSINOPHIL NFR BLD: 5.2 % (ref 0–6.9)
ERYTHROCYTE [DISTWIDTH] IN BLOOD BY AUTOMATED COUNT: 44.3 FL (ref 35.9–50)
GLOBULIN SER CALC-MCNC: 3.3 G/DL (ref 1.9–3.5)
GLUCOSE BLD-MCNC: 121 MG/DL (ref 65–99)
GLUCOSE BLD-MCNC: 149 MG/DL (ref 65–99)
GLUCOSE BLD-MCNC: 149 MG/DL (ref 65–99)
GLUCOSE SERPL-MCNC: 118 MG/DL (ref 65–99)
HCT VFR BLD AUTO: 41.8 % (ref 42–52)
HGB BLD-MCNC: 14 G/DL (ref 14–18)
LYMPHOCYTES # BLD AUTO: 2.79 K/UL (ref 1–4.8)
LYMPHOCYTES NFR BLD: 20.7 % (ref 22–41)
MAGNESIUM SERPL-MCNC: 2.1 MG/DL (ref 1.5–2.5)
MANUAL DIFF BLD: NORMAL
MCH RBC QN AUTO: 28.5 PG (ref 27–33)
MCHC RBC AUTO-ENTMCNC: 33.5 G/DL (ref 33.7–35.3)
MCV RBC AUTO: 85 FL (ref 81.4–97.8)
MONOCYTES # BLD AUTO: 0.7 K/UL (ref 0–0.85)
MONOCYTES NFR BLD AUTO: 5.2 % (ref 0–13.4)
MORPHOLOGY BLD-IMP: NORMAL
NEUTROPHILS # BLD AUTO: 9.32 K/UL (ref 1.82–7.42)
NEUTROPHILS NFR BLD: 69 % (ref 44–72)
NRBC # BLD AUTO: 0 K/UL
NRBC BLD-RTO: 0 /100 WBC
PHOSPHATE SERPL-MCNC: 3.6 MG/DL (ref 2.5–4.5)
PLATELET # BLD AUTO: 345 K/UL (ref 164–446)
PLATELET BLD QL SMEAR: NORMAL
PMV BLD AUTO: 10.5 FL (ref 9–12.9)
POLYCHROMASIA BLD QL SMEAR: NORMAL
POTASSIUM SERPL-SCNC: 3.4 MMOL/L (ref 3.6–5.5)
PROT SERPL-MCNC: 5.6 G/DL (ref 6–8.2)
RBC # BLD AUTO: 4.92 M/UL (ref 4.7–6.1)
RBC BLD AUTO: PRESENT
SODIUM SERPL-SCNC: 132 MMOL/L (ref 135–145)
WBC # BLD AUTO: 13.5 K/UL (ref 4.8–10.8)

## 2021-03-17 PROCEDURE — 700102 HCHG RX REV CODE 250 W/ 637 OVERRIDE(OP): Performed by: FAMILY MEDICINE

## 2021-03-17 PROCEDURE — 99231 SBSQ HOSP IP/OBS SF/LOW 25: CPT | Performed by: PSYCHIATRY & NEUROLOGY

## 2021-03-17 PROCEDURE — 84100 ASSAY OF PHOSPHORUS: CPT

## 2021-03-17 PROCEDURE — 700102 HCHG RX REV CODE 250 W/ 637 OVERRIDE(OP): Performed by: STUDENT IN AN ORGANIZED HEALTH CARE EDUCATION/TRAINING PROGRAM

## 2021-03-17 PROCEDURE — 700102 HCHG RX REV CODE 250 W/ 637 OVERRIDE(OP): Performed by: HOSPITALIST

## 2021-03-17 PROCEDURE — 97164 PT RE-EVAL EST PLAN CARE: CPT

## 2021-03-17 PROCEDURE — 83735 ASSAY OF MAGNESIUM: CPT

## 2021-03-17 PROCEDURE — A9270 NON-COVERED ITEM OR SERVICE: HCPCS | Performed by: FAMILY MEDICINE

## 2021-03-17 PROCEDURE — 85007 BL SMEAR W/DIFF WBC COUNT: CPT

## 2021-03-17 PROCEDURE — 51798 US URINE CAPACITY MEASURE: CPT

## 2021-03-17 PROCEDURE — 85027 COMPLETE CBC AUTOMATED: CPT

## 2021-03-17 PROCEDURE — 770020 HCHG ROOM/CARE - TELE (206)

## 2021-03-17 PROCEDURE — 82962 GLUCOSE BLOOD TEST: CPT

## 2021-03-17 PROCEDURE — A9270 NON-COVERED ITEM OR SERVICE: HCPCS | Performed by: HOSPITALIST

## 2021-03-17 PROCEDURE — A9270 NON-COVERED ITEM OR SERVICE: HCPCS | Performed by: STUDENT IN AN ORGANIZED HEALTH CARE EDUCATION/TRAINING PROGRAM

## 2021-03-17 PROCEDURE — 99231 SBSQ HOSP IP/OBS SF/LOW 25: CPT | Performed by: FAMILY MEDICINE

## 2021-03-17 PROCEDURE — 700111 HCHG RX REV CODE 636 W/ 250 OVERRIDE (IP): Performed by: FAMILY MEDICINE

## 2021-03-17 PROCEDURE — 36415 COLL VENOUS BLD VENIPUNCTURE: CPT

## 2021-03-17 PROCEDURE — 80053 COMPREHEN METABOLIC PANEL: CPT

## 2021-03-17 RX ORDER — OMEPRAZOLE 20 MG/1
20 CAPSULE, DELAYED RELEASE ORAL DAILY
Status: DISCONTINUED | OUTPATIENT
Start: 2021-03-17 | End: 2021-04-13

## 2021-03-17 RX ORDER — PREDNISONE 20 MG/1
20 TABLET ORAL DAILY
Status: COMPLETED | OUTPATIENT
Start: 2021-03-17 | End: 2021-03-20

## 2021-03-17 RX ADMIN — LEVOTHYROXINE SODIUM 112 MCG: 0.11 TABLET ORAL at 05:32

## 2021-03-17 RX ADMIN — OMEPRAZOLE 20 MG: 20 CAPSULE, DELAYED RELEASE ORAL at 11:21

## 2021-03-17 RX ADMIN — PREDNISONE 20 MG: 20 TABLET ORAL at 17:20

## 2021-03-17 RX ADMIN — CEFAZOLIN SODIUM 2 G: 2 INJECTION, SOLUTION INTRAVENOUS at 05:31

## 2021-03-17 RX ADMIN — APIXABAN 5 MG: 5 TABLET, FILM COATED ORAL at 20:31

## 2021-03-17 RX ADMIN — TAMSULOSIN HYDROCHLORIDE 0.4 MG: 0.4 CAPSULE ORAL at 05:32

## 2021-03-17 RX ADMIN — SIMETHICONE 80 MG: 80 TABLET, CHEWABLE ORAL at 11:21

## 2021-03-17 RX ADMIN — DOCUSATE SODIUM 50 MG AND SENNOSIDES 8.6 MG 2 TABLET: 8.6; 5 TABLET, FILM COATED ORAL at 08:03

## 2021-03-17 RX ADMIN — ZIPRASIDONE HYDROCHLORIDE 60 MG: 60 CAPSULE ORAL at 08:03

## 2021-03-17 RX ADMIN — CLONAZEPAM 1 MG: 1 TABLET ORAL at 17:20

## 2021-03-17 RX ADMIN — DOCUSATE SODIUM 50 MG AND SENNOSIDES 8.6 MG 2 TABLET: 8.6; 5 TABLET, FILM COATED ORAL at 20:31

## 2021-03-17 RX ADMIN — MIDODRINE HYDROCHLORIDE 10 MG: 5 TABLET ORAL at 11:21

## 2021-03-17 RX ADMIN — MIDODRINE HYDROCHLORIDE 10 MG: 5 TABLET ORAL at 08:04

## 2021-03-17 RX ADMIN — CEFAZOLIN SODIUM 2 G: 2 INJECTION, SOLUTION INTRAVENOUS at 20:31

## 2021-03-17 RX ADMIN — CEFAZOLIN SODIUM 2 G: 2 INJECTION, SOLUTION INTRAVENOUS at 14:16

## 2021-03-17 RX ADMIN — DIGOXIN 125 MCG: 125 TABLET ORAL at 17:20

## 2021-03-17 RX ADMIN — ZIPRASIDONE HYDROCHLORIDE 60 MG: 60 CAPSULE ORAL at 20:31

## 2021-03-17 RX ADMIN — SIMETHICONE 80 MG: 80 TABLET, CHEWABLE ORAL at 17:20

## 2021-03-17 RX ADMIN — APIXABAN 5 MG: 5 TABLET, FILM COATED ORAL at 08:03

## 2021-03-17 RX ADMIN — MIDODRINE HYDROCHLORIDE 10 MG: 5 TABLET ORAL at 17:20

## 2021-03-17 RX ADMIN — HYDROXYZINE HYDROCHLORIDE 25 MG: 25 TABLET, FILM COATED ORAL at 08:04

## 2021-03-17 RX ADMIN — ATORVASTATIN CALCIUM 40 MG: 40 TABLET, FILM COATED ORAL at 17:20

## 2021-03-17 RX ADMIN — CLONAZEPAM 0.5 MG: 0.5 TABLET ORAL at 08:03

## 2021-03-17 RX ADMIN — FLUDROCORTISONE ACETATE 0.2 MG: 0.1 TABLET ORAL at 05:32

## 2021-03-17 RX ADMIN — POTASSIUM CHLORIDE 20 MEQ: 20 TABLET, EXTENDED RELEASE ORAL at 05:33

## 2021-03-17 RX ADMIN — PAROXETINE HYDROCHLORIDE 20 MG: 20 TABLET, FILM COATED ORAL at 08:04

## 2021-03-17 RX ADMIN — SIMETHICONE 80 MG: 80 TABLET, CHEWABLE ORAL at 05:38

## 2021-03-17 ASSESSMENT — ENCOUNTER SYMPTOMS
NERVOUS/ANXIOUS: 0
MYALGIAS: 0
EYE PAIN: 0
VOMITING: 0
DEPRESSION: 1
NECK PAIN: 0
BRUISES/BLEEDS EASILY: 0
SHORTNESS OF BREATH: 0
FEVER: 0
NAUSEA: 0
HEADACHES: 0

## 2021-03-17 ASSESSMENT — COGNITIVE AND FUNCTIONAL STATUS - GENERAL
MOVING FROM LYING ON BACK TO SITTING ON SIDE OF FLAT BED: UNABLE
WALKING IN HOSPITAL ROOM: A LOT
CLIMB 3 TO 5 STEPS WITH RAILING: TOTAL
SUGGESTED CMS G CODE MODIFIER MOBILITY: CM
MOVING TO AND FROM BED TO CHAIR: UNABLE
TURNING FROM BACK TO SIDE WHILE IN FLAT BAD: UNABLE
STANDING UP FROM CHAIR USING ARMS: A LOT
MOBILITY SCORE: 8

## 2021-03-17 ASSESSMENT — FIBROSIS 4 INDEX: FIB4 SCORE: 0.89

## 2021-03-17 ASSESSMENT — GAIT ASSESSMENTS: GAIT LEVEL OF ASSIST: UNABLE TO PARTICIPATE

## 2021-03-17 NOTE — PROGRESS NOTES
Assumed care of pt. Bedside report received from night RN Tani. Pt appears to be asleep. Bed locked in lowest position, 2 side rails up, bed alarm on and working appropriately.  1:1 sitter at bedside.

## 2021-03-17 NOTE — CARE PLAN
Problem: Safety  Goal: Will remain free from injury  Outcome: PROGRESSING AS EXPECTED     Problem: Knowledge Deficit  Goal: Knowledge of disease process/condition, treatment plan, diagnostic tests, and medications will improve  Outcome: PROGRESSING AS EXPECTED     Problem: Mobility  Goal: Risk for activity intolerance will decrease  Outcome: PROGRESSING SLOWER THAN EXPECTED     Problem: Potential for Self Harm  Goal: Achieve stable functioning  Outcome: PROGRESSING SLOWER THAN EXPECTED  Goal: Abstinence  Outcome: PROGRESSING AS EXPECTED  Goal: No active self-harm  Outcome: PROGRESSING SLOWER THAN EXPECTED  Goal: Symptom reduction and improved functioning  Outcome: PROGRESSING SLOWER THAN EXPECTED  Goal: Resolve acute exacerbation and return to psychiatric baseline  Outcome: PROGRESSING SLOWER THAN EXPECTED

## 2021-03-17 NOTE — DISCHARGE PLANNING
Anticipated Discharge Disposition: inpatient psych    Action: Patient discussed in IDT rounds. Patient not medically clear at this time. Nursing to work with patient on doing his own ostomy care.    Barriers to Discharge: Ostomy, SI, complex medical needs.     Plan: HCM to continue to follow and assist as needed

## 2021-03-17 NOTE — PROGRESS NOTES
Assumed care of Pt, He is alert and oriented. Discussed POC with day shift RN at bedside. Bed is locked in lowest position and call light/ belongings are within reach. Sitter at bedside.

## 2021-03-17 NOTE — PROGRESS NOTES
[facility] Medicine Daily Progress Note    Date of Service  3/17/2021    Chief Complaint  56 y.o. male admitted 2/9/2021 with   Chief Complaint   Patient presents with   • Syncope     X1 this morning when standing up to go to bathroom         Hospital Course  Mr. Sebastián Castro is a 56 y.o. male history of atrial fibrillation secondary to hypercoagulable state, diabetes complicated by diabetic neuropathy, congestive heart failure, hyperlipidemia, orthostatic hypotension on fludrocortisone who presented on 2/9/2021 with syncopal episode.  Patient reported dizziness prior to syncope.  On presentation to the emergency room she was hypertensive and bradycardic.  Cardiology recommended stopping all AV sanket blocking agents.  He was also started on midodrine for hypotension.    The patient was started on digoxin and apixaban for atrial fibrillation.  Echocardiogram showed EF of 60%, improved from previous echocardiogram obtained on 11/2020.    Of note patient has history of a spleen injury complicated by perforation of splenic flexure in September 2020.  Status post colostomy and Interiano's pouch in November 2020.  He was at home taking care of his colostomy prior to most recent admission.  However he has become depressed, overwhelmed, and can no longer take care of his colostomy independently.    Psych continues to follow the patient; psychiatric medication as per psych recommendation.      Interval Problem Update  Febrile to 38.1 C overnight.   Developed hypersensitivity rash and itching due to zosyn.   Switched to ciprofloxacin and treated with benadryl.  BCx resulted as pan-susceptible Klebsiella pneumo.   Discussed with pharmacist Ashley and decided on bactrim.    He reports that the itching and rash are improving.  Gas is better today than it was yesterday.  Leg swelling feels better.  He denies dyspnea, pain, N/V, chills, dysuria, bowel dysfunction.      Emotionally he is having a hard time from feeling sick.  He  reports that staff has been consistently kind to him since yesterday.  Seen by psychiatrist Dr. Easley yesterday, no changes in medication.  3/16: Resting in bed comfortable.  Pruritic rash noted on upper extremities, chest, and neck.  Afebrile.  Hemodynamically stable.  Saturating well on room air.  Still admits to suicidal ideation.  Sitter at bedside.  No acute distress noted.  No issues overnight per staff.  3/17: Resting in bed comfortably. No acute distress noted.  Still have pleuritic rash all over.  Hemodynamically stable.  Saturating well on room air.  Sitter at bedside.  Still admits to suicidal ideation.  No acute distress noted.  No issues overnight per staff.    Consultants/Specialty  Psychiatry - SI  Cardiology - Hypotension and Atrial Fibrillation    Code Status  Full Code    Disposition  To inpatient psychiatry pending improvement in sepsis, hypotension, and hypoxic respiratory failure.  Must be independently managing ostomy prior to transfer to psych facility.    Review of Systems  Review of Systems   Constitutional: Negative for fever.   HENT: Negative for ear pain.    Eyes: Negative for pain.   Respiratory: Negative for shortness of breath.    Cardiovascular: Positive for leg swelling. Negative for chest pain.   Gastrointestinal: Negative for nausea and vomiting.   Genitourinary: Negative for dysuria and urgency.   Musculoskeletal: Negative for myalgias and neck pain.   Skin: Positive for itching and rash.   Neurological: Negative for headaches.   Endo/Heme/Allergies: Does not bruise/bleed easily.   Psychiatric/Behavioral: Positive for depression. The patient is not nervous/anxious.         Physical Exam  Temp:  [36.2 °C (97.1 °F)-37.3 °C (99.1 °F)] 36.3 °C (97.3 °F)  Pulse:  [62-71] 71  Resp:  [16-18] 16  BP: ()/(48-64) 104/64  SpO2:  [91 %-95 %] 93 %    Physical Exam  Vitals and nursing note reviewed.   Constitutional:       General: He is not in acute distress.     Appearance: He  is obese. Ill appearance: Appears fatigued.      Interventions: Nasal cannula in place.   HENT:      Head: Normocephalic.      Nose: Nose normal.      Mouth/Throat:      Mouth: Mucous membranes are moist.   Eyes:      General: No scleral icterus.     Conjunctiva/sclera: Conjunctivae normal.   Cardiovascular:      Rate and Rhythm: Normal rate and regular rhythm.      Pulses: Normal pulses.      Heart sounds: Normal heart sounds. No friction rub. No gallop.    Pulmonary:      Effort: Pulmonary effort is normal.      Breath sounds: Normal breath sounds. No wheezing, rhonchi or rales.   Abdominal:      General: Abdomen is flat. Bowel sounds are normal.      Palpations: Abdomen is soft.      Tenderness: There is no abdominal tenderness. There is no guarding or rebound.   Genitourinary:     Comments: Ostomy site dressed, nonerythematous, nontender.  Ostomy output watery / brown stool, some gas in bag but not tense.  Musculoskeletal:         General: Normal range of motion.      Cervical back: Neck supple.      Right lower leg: Edema present.      Left lower leg: Edema present.      Comments: 1+ edema to knees   Skin:     General: Skin is warm.      Findings: Erythema (Generalized) and rash present.      Comments: BLE chronic stasis dermatitis   Neurological:      Mental Status: He is alert.      Cranial Nerves: No cranial nerve deficit.      Comments: Appropriately conversant   Psychiatric:         Attention and Perception: Attention and perception normal.         Mood and Affect: Affect normal. Mood is depressed.         Speech: Speech normal.         Behavior: Behavior normal. Behavior is cooperative.         Thought Content: Thought content does not include suicidal ideation.         Cognition and Memory: Cognition and memory normal.         Judgment: Judgment normal.         Fluids    Intake/Output Summary (Last 24 hours) at 3/17/2021 1530  Last data filed at 3/17/2021 1132  Gross per 24 hour   Intake 240 ml   Output  1120 ml   Net -880 ml       Laboratory  Recent Labs     03/15/21  0324 03/16/21  0619 03/17/21  0315   WBC 15.0* 19.9* 13.5*   RBC 5.32 5.37 4.92   HEMOGLOBIN 15.5 15.3 14.0   HEMATOCRIT 45.4 45.9 41.8*   MCV 85.3 85.5 85.0   MCH 29.1 28.5 28.5   MCHC 34.1 33.3* 33.5*   RDW 44.2 44.7 44.3   PLATELETCT 282 341 345   MPV 10.2 10.4 10.5     Recent Labs     03/15/21  1751 03/16/21  0619 03/17/21  0315   SODIUM 126* 124* 132*   POTASSIUM 3.8 3.4* 3.4*   CHLORIDE 91* 92* 98   CO2 24 22 24   GLUCOSE 138* 135* 118*   BUN 33* 38* 48*   CREATININE 1.58* 1.50* 1.66*   CALCIUM 8.7 8.7 8.7                   Imaging  [id number] VIEW   Final Result         No significant interval change.      DX-CHEST-LIMITED (1 VIEW)   Final Result         Diffuse interstitial prominence could relate to mild pulmonary edema or atypical infection      [id number] VIEW   Final Result      Increased colonic gas without definite bowel obstruction.      US-RENAL   Final Result      No evidence of hydronephrosis.      Lobulated kidneys bilaterally.      CT-ABDOMEN-PELVIS WITH   Final Result      1.  There is no evidence of small bowel obstruction.   2.  There is a left lower quadrant ostomy.   3.  There is fluid distention of the colon distal to the surgical material in the left mid descending colon extending all the way to the rectum. There is no pneumatosis or free air.   4.  There is cholelithiasis without biliary dilatation.   5.  There has been interval removal of the drainage catheter anterior to the spleen with minimal hypodense area in the anterior spleen again noted. There is no new fluid collection.      IR-US GUIDED PIV   Final Result    Ultrasound-guided PERIPHERAL IV INSERTION performed by    qualified nursing staff as above.      EC-ECHOCARDIOGRAM COMPLETE W/O CONT   Final Result      DX-LUMBAR SPINE-2 OR 3 VIEWS   Final Result      Moderate compression deformity of T11 is new compared to 2018.      Mild wedge deformity of T12 is  unchanged.      Degenerative changes including facet arthropathy.      Mild retrolisthesis of L5 on S1 and L3 on L4.              Assessment/Plan  * Suicidal ideation- (present on admission)  Assessment & Plan  Has history of MDD, recurrent with psychotic features with active SI w/ plan.  Psychiatry consulted, continued legal hold  Legal hold until 3/18  To be discharged to psychiatric program pending medical clearance and when able to self-care for ostomy    Cardiogenic pulmonary edema (HCC)  Assessment & Plan  CXR demonstrates pulmonary vascular congestion / edema  NT-BNP 7828  Due to IV boluses for hypotension and N/V in setting of HFrEF with recovered EF  IV lasix tolerated with improvement in BP and Cr, discontinued 3/14 for severe sepsis and lactic acidosis  3/16: Currently saturating well on room air.    Major depressive disorder, recurrent, severe with psychotic features (HCC)- (present on admission)  Assessment & Plan  Psychiatry and Psychology following  Continue paroxetine, ziprasidone, and clonazepam    Secondary adrenal insufficiency (HCC)- (present on admission)  Assessment & Plan  Cosyntropin stimulation test reviewed with only a increase in his cortisol going from 14.5 then up to 21.3 after cosyntropin given  Renin 0.1, ACTH 3.4 and slight increase with cosyntropin suggesting secondary AI  Random cortisol 9.9  Needs outpatient endocrinology follow up.  Repeat adrenal function after discharge  Repeat AM BMP    Florinef restarted 3/14 at 0.2 mg daily (2x dose) due to severe sepsis from GNR bacteremia in order to prevent adrenal crisis    Sepsis with acute renal failure without septic shock (HCC)  Assessment & Plan  This is Severe Sepsis Not present on admission  SIRS criteria identified on my evaluation include: Tachycardia, with heart rate greater than 90 BPM and Leukocytosis, with WBC greater than 12,000  Source of infection is GNR bacteremia  Clinical indicators of end organ dysfunction include  Systolic blood pressure (SBP) <90 mmHg or mean arterial pressure <65 mmHg and Lactate >2 mmol/L (18.0 mg/dL)  Sepsis protocol initiated  Fluid resuscitation not ordered due to pulmonary edema seen on CXR due to HFrEF  IV antibiotics as appropriate for source of sepsis - Zosyn    BCx x2 (3/13) +GNR in one bottle, awaiting speciation and susceptbilities  UCx added to UA (3/13) that showed pyuria and bacteruria  Cdiff negative (3/12)  Prior microbiology date reviewed, h/o enterococcus and MRSA but currently not positive for GPCs    Reassessment: I have reassessed the patient's hemodynamic status  End organ dysfunction include(s):  Acute kidney failure (SRUTHI)  Cr and BP improved after diuresis yesterday for cardiogenic pulmonary edema  Lactic Acid 2.8, improved with holding diuresis      Orthostatic hypotension- (present on admission)  Assessment & Plan  Multifactorial including adrenal insufficiency, medications (beta blocker, CCB, tamsulosin), diabetic autonomic dysfunction, and venous insufficiency  EF is noted to be 60%, significant improvement from prior.  Per cardiology, no further cardiac work-up needed  Follow-up with cardiology as an outpatient.  Continue midodrine, PRN IVF    Bacteremia  Assessment & Plan  Due to klebsiella bacteremia  BCx: 1 of 2 bottles +Klebsiella  Cdiff negative  UA: Bacteruria and pyuria  UCx: In progress  CXR with pulmonary edema but no infiltrates  3/16: Currently on Ancef.        Abdominal bloating  Assessment & Plan  Improved  Had increasing gas output from ostomy  Intermittent episodes of abdominal pain and N/V  CT 3/4 did not demonstrate obstruction nor pneumotosis  KUB 3/12 for recurrent symptoms demonstrated increased gas but no signs of SBO  Simethicone scheduled 80 mg TID  If uncontrolled, would confer with general surgeon about further imaging and discuss empiric treatment for SIBO    Normal anion gap metabolic acidosis  Assessment & Plan  Resolved  Due to bicarbonate loss via  ostomy  Repeat AM BMP    Syncope due to orthostatic hypotension- (present on admission)  Assessment & Plan  Likely secondary to orthostatic hypotension  Echo normal with 60% ejection fraction, improved from prior    Paroxysmal atrial fibrillation (HCC)- (present on admission)  Assessment & Plan  CHADSVASC 3 (HTN, CHF, DM).   TTE Feb 2021: EF 60%  Continue metoprolol, digoxin, and eliquis  Discontinued telemetry    Chronic heart failure with preserved ejection fraction (HCC)- (present on admission)  Assessment & Plan  Decompensated based on peripheral and pulmonary edema, mild hyponatremia  IV diuresis initiated 3/13 for pulmonary edema, held 3/14 in setting of lactic acidosis from severe sepsis  Recovered EF 60% on repeat echo.  Continue metoprolol XL  Lisinopril held per Cardiology recommendations due to hypotension      Diabetes mellitus type 2, insulin dependent (HCC)- (present on admission)  Assessment & Plan  A1c 5.2  Diabetic diet  Restarting accuchecks and SSI due to GNR sepsis  BG goal <200 with bacteremia  Continue atorvastatin    Colostomy present (HCC)- (present on admission)  Assessment & Plan  History of fall at home Nov 2020 on left side with perforated colon and splenic fluid collection/hematoma s/p segmental colectomy, colostomy, mobilization of the splenic flexure, wound vac, and I&D Nov 2020.   Ostomy care consulted, but he was non-participatory  He is engaging in ostomy care with his bedside RNs, encouraged to be observed but do it independently  Patient must be independently able to care for his ostomy before inpatient psychiatry will accept him  Reversal timing and workup after 6 months per [facility]  3/16: Patient has to perform self ostomy care in order to be transferred to inpatient psychiatric facility.    Anxiety and depression- (present on admission)  Assessment & Plan  On Legal Hold until 3/18  Psychiatry consulted  Continue paroxetine, ziprasidone, and clonazepam  Would  likely benefit from CBT or DBT  3/16: Psychiatry recommending transferring to inpatient psychiatric facility once medically cleared.  However patient has to perform self ostomy care which has been challenging.  Management and  working on best safe discharge plan.    Hypothyroidism- (present on admission)  Assessment & Plan  TSH wnl Feb 2021  Continue home Levothyroxine    Acute renal failure superimposed on stage 3 chronic kidney disease (HCC)- (present on admission)  Assessment & Plan  CKD 3  Had improved with increased midodrine and diuresis, but worsened this morning in setting of holding diuresis for hypotension and lactic acidosis  500 cc NS bolus today, repeat BMP this afternoon  Developed AHRF with pulmonary edema from 1 L bolus, judicious IVF  Diuresis held, continue midodrine  3/16: Discontinue Bactrim as it will worsen kidney functions. Slowly improving.  Avoid nephrotoxins.  Renally dose all medications.    Debility- (present on admission)  Assessment & Plan  Likely due to depression  PT/OT evaluated, no further skilled needs    Essential hypertension- (present on admission)  Assessment & Plan  Resolved, has been hypotensive this admission in setting of secondary adrenal insufficiency  Continue midodrine    Lower limb amputation, great toe (HCC)- (present on admission)  Assessment & Plan  s/p right great toe amputation 2018    Hypomagnesemia  Assessment & Plan  Mg WNL today  Repeat AM BMP/Mg    Hypokalemia  Assessment & Plan  K 3.2  Lasix held yesterday due to hypotension and lactic acidosis  Continue Kdur 20 daily  Discussed with pharmacy, no additional K today due to risk of hyperkalemia with bactrim  Mg WNL  Repeat AM BMP/Mg    Hyponatremia  Assessment & Plan  Moderate, Na 127  In setting of IV lasix, holding lasix due to severe sepsis with lactic acidosis  500 cc NS trial today, repeat afternoon BMP  If worsening will get Delmis and Uosm  Repeat AM BMP    Obstructive uropathy- (present on  admission)  Assessment & Plan  Urinary retention on bladder scans by straight cath.  Zabala catheter was placed temporarily.  Remove with voiding trials per nursing protocol.    Rash- (present on admission)  Assessment & Plan  Patient developed pruritic drug related rash   Prn Benadryl, steroids     Chronic venous insufficiency of lower extremity- (present on admission)  Assessment & Plan  Compression stockings    Elevated troponin- (present on admission)  Assessment & Plan  RESOLVED  Stable. Suspect due to demand ischemia. No need to trend further.    Nonischemic cardiomyopathy (HCC)- (present on admission)  Assessment & Plan  Normal coronaries, 2018.  Tachycardia mediated per cardiology  LVEF improved, based on echo 2/10/2021 LVEF 60%    Chest pain- (present on admission)  Assessment & Plan  Resolved  EKG and Troponins not consistent with ACS    Mixed hyperlipidemia- (present on admission)  Assessment & Plan  Continue atorvastatin    Obesity- (present on admission)  Assessment & Plan  Body mass index is 37.79 kg/m².  Counseled about diet and exercise       VTE prophylaxis:  Eliquis

## 2021-03-17 NOTE — DISCHARGE PLANNING
Spoke to  Jonas regarding patient's meeting with the court doctors today. Patient has been scheduled to meet with court doctors via telemedicine in the [address] room at 1145. Let ELISA Almanza know of upcoming meeting this morning.

## 2021-03-17 NOTE — THERAPY
"Physical Therapy   Re-Evaluation     Patient Name: Sebastián Castro  Age:  56 y.o., Sex:  male  Medical Record #: [id number]  Today's Date: 3/17/2021     Precautions: (P) Fall Risk    Assessment  Patient is 56 y.o. male with a diagnosis of cardiogenic pulmonary edema, sepsis with acute renal failure, bacteremia, and acute on chronic CHF. Pt with active suicide risk. Other PMH includes hypotension and dizziness, a-fib, DM with neuropathy, CHF, anxiety, depression, hypothyroidism, CKDIII, great toe amputation 2018. Pt has been in hospital 36 days. He had previously been supv with gait and transfers with 4WW per therapy notes. Pt reporting he does not remember the last time he was out of bed to walk. Today, pt required mod A with supine to sit, which was slow and effortful with poor sequencing. Pt was able to scoot to EOB with SBA and cues, but also requiring increased time and effort. He was able to transfer to standing with 2 person mod A on first attempt, and CGAx2 on second attempt to 4WW, but tolerated standing only approx 1 minute each time limited by onset of dizziness. Suspect hypotension based on pt history. Overall, pt with significant decline in mobility from last PT visit. PT was not able to assess ambulation today due to dizziness. Pt will benefit from further PT in the acute setting to return to prior ability of supv with mobility and stairs. Based on abilities today, pt will benefit from further PT in the post-acute setting.    Plan    Recommend Physical Therapy 4 times per week until therapy goals are met for the following treatments:  Bed Mobility, Gait Training, Neuro Re-Education / Balance, Stair Training, Therapeutic Activities and Therapeutic Exercises    DC Equipment Recommendations: (P) None(DME needs met at home)  Discharge Recommendations: (P) Recommend post-acute placement for additional physical therapy services prior to discharge home       Subjective    \"I don't know when I walked last.\"   "   Objective       03/17/21 1454   Prior Living Situation   Prior Services None   Housing / Facility Group Home   Steps Into Home 5   Rail Both Rail (Steps into Home)   Bathroom Set up Bathtub / Shower Combination   Equipment Owned 4-Wheel Walker   Lives with - Patient's Self Care Capacity Alone and Able to Care For Self   Comments reports he lives in a group home with assistance for housekeeping, meals   Prior Level of Functional Mobility   Bed Mobility Independent   Transfer Status Independent   Ambulation Independent   Assistive Devices Used 4-Wheel Walker   Stairs Independent   History of Falls   History of Falls Yes   Cognition    Comments pleasant, willing to work with PT   Strength Lower Body   Rt Hip Flexion Strength 3+ (F+)   Rt Knee Flexion Strength 5 (N)   Rt Knee Extension Strength 5 (N)   Rt Ankle Dorsiflexion Strength 5 (N)   Lt Hip Flexion Strength 3+ (F+)   Lt Knee Flexion Strength 5 (N)   Lt Knee Extension Strength 5 (N)   Lt Ankle Dorsiflexion Strength 5 (N)   Comments hip flexion limited by panniculus   Balance Assessment   Sitting Balance (Static) Fair   Sitting Balance (Dynamic) Fair -   Standing Balance (Static) Fair -   Standing Balance (Dynamic) Fair -   Weight Shift Sitting Fair   Weight Shift Standing Fair   Comments with 4ww, initially with posterior sway in standing   Gait Analysis   Gait Level Of Assist Unable to Participate   Comments not attempted due to dizziness with standing   Bed Mobility    Supine to Sit Moderate Assist   Sit to Supine Supervised   Scooting Minimal Assist   Rolling Minimal Assist to Rt.   Comments cues for positioning, use of bed features to help with rolling   Functional Mobility   Sit to Stand Moderate Assist   Mobility supine->sit EOB->stand twice->sit->supine   Comments mod x2 on first attempt to stand, CGA x2 on second attempt; standing limited by dizziness   How much help from another person does the patient currently need...   6 clicks Mobility Score 8    Activity Tolerance   Sitting in Chair not tested, due to dizziness   Sitting Edge of Bed 10 minutes   Standing 2x 1 minute approx   Comments standing limited by dizziness   Patient / Family Goals    Patient / Family Goal #1 walk again   Short Term Goals    Short Term Goal # 1 Pt will perform supine<->sit with supv within 6 visits in order to return home   Short Term Goal # 2 Pt will transfer bed<->chair with 4WW with supv within 6 visits in order to return home   Short Term Goal # 3 Pt will ambulate 100' with supv within 6 visits in order to return home   Short Term Goal # 4 Pt will ascend/descend 5 steps with supv within 6 visits in order to enter/exit home   Education Group   Education Provided Role of Physical Therapist;Exercises - Supine   Role of Physical Therapist Patient Response Patient;Acceptance;Explanation;Verbal Demonstration   Exercises - Supine Patient Response Patient;Acceptance;Demonstration;Reinforcement Needed   Additional Comments instructed ankle pumps, quad set, glut set   Problem List    Problems Impaired Bed Mobility;Impaired Transfers;Impaired Ambulation;Functional Strength Deficit;Impaired Balance;Decreased Activity Tolerance   Anticipated Discharge Equipment and Recommendations   DC Equipment Recommendations None  (DME needs met at home)   Discharge Recommendations Recommend post-acute placement for additional physical therapy services prior to discharge home

## 2021-03-17 NOTE — CONSULTS
"PSYCHIATRIC FOLLOW-UP:(established)  *Reason/fair admission: Syncope, hypotension      *Legal Hold Status on Admission:     On hold       Chart reviewed.         *HPI:   She states that he feels physically a little bit better but that mentally \" I am in the toilet\".  He continues to have suicidal thoughts.  Has thought about hanging himself, but has no specific plan.  His intention is questionable.  He likes to have a sitter with him 24 hours to make sure that he is safe.  Reports fair sleep.  Continues to have auditory hallucinations, which have not worsened or got better.           *Psychiatric Examination:  Vitals:   Vitals:    03/17/21 0820   BP: 104/64   Pulse: 71   Resp: 16   Temp: 36.3 °C (97.3 °F)   SpO2: 93%       General Appearance: Fair grooming hygiene, calm and cooperative with good eye contact  Abnormal Movements: None  Gait and Posture: Normal lying bed  Speech: Normal volume, tone and rhythm  Thought processes: Linear  Associations: No loose associations  Abnormal or Psychotic Thoughts: +AH unchanged  Judgement and Insight: Fair/fair  Orientation: Fully oriented  Recent and Remote Memory: Intact  Attention Span and Concentration: Intact  Language: Alfredito  Fund of Knowledge: Not  Mood and Affect: \"I feel likeI am in the toilet\", constricted but reactive  SI/HI:+SI with method, no plan, questionable intent         *Labs personally reviewed: CBC, CMP    Assessment: Continues to endorse SI method, no plan and questionable intent.      Dx:  MDD, recurrent with congruent psychotic features  Unspecified anxiety disorder     Medical:     Hyponatremia   Leukocytosis  Ostomy   orthostatic hypotension  Syncope  Paroxysmal atrial fibrillation  Chronic heart failure  Diabetes type 2  Hypothyroidism  Essential hypertension  Debility  Chronic venous insufficiency of lower extremity  Colostomy present  Nonischemic cardiomyopathy  Mixed hyperlipidemia  Obesity           Plan:  1- Legal hold:extended  2- Psychotropic " medications: continue current regimen  3- Please transfer pt to inpatient psychiatric hospital when medically cleared and bed is available  4-Continue inpatient psychotherapy  5- Psychiatry will follow up      Sitter:  Phone: Yes yes  Visitors:  Personal belongings:     This note was created using voice recognition software (Dragon). The accuracy of the dictation is limited by the abilities of the software. I have reviewed the note prior to signing. However, error related to voice recognition software and /or scribes may still exist and should be interpreted within the appropriate context.

## 2021-03-18 LAB
ALBUMIN SERPL BCP-MCNC: 2.5 G/DL (ref 3.2–4.9)
ALBUMIN/GLOB SERPL: 0.7 G/DL
ALP SERPL-CCNC: 155 U/L (ref 30–99)
ALT SERPL-CCNC: 14 U/L (ref 2–50)
ANION GAP SERPL CALC-SCNC: 10 MMOL/L (ref 7–16)
AST SERPL-CCNC: 23 U/L (ref 12–45)
BACTERIA BLD CULT: NORMAL
BASOPHILS # BLD AUTO: 0.9 % (ref 0–1.8)
BASOPHILS # BLD: 0.12 K/UL (ref 0–0.12)
BILIRUB SERPL-MCNC: 0.2 MG/DL (ref 0.1–1.5)
BUN SERPL-MCNC: 37 MG/DL (ref 8–22)
CALCIUM SERPL-MCNC: 9.2 MG/DL (ref 8.5–10.5)
CHLORIDE SERPL-SCNC: 100 MMOL/L (ref 96–112)
CO2 SERPL-SCNC: 25 MMOL/L (ref 20–33)
CREAT SERPL-MCNC: 1.22 MG/DL (ref 0.5–1.4)
EOSINOPHIL # BLD AUTO: 1.44 K/UL (ref 0–0.51)
EOSINOPHIL NFR BLD: 11.2 % (ref 0–6.9)
ERYTHROCYTE [DISTWIDTH] IN BLOOD BY AUTOMATED COUNT: 45 FL (ref 35.9–50)
GLOBULIN SER CALC-MCNC: 3.6 G/DL (ref 1.9–3.5)
GLUCOSE BLD-MCNC: 178 MG/DL (ref 65–99)
GLUCOSE BLD-MCNC: 195 MG/DL (ref 65–99)
GLUCOSE BLD-MCNC: 233 MG/DL (ref 65–99)
GLUCOSE SERPL-MCNC: 174 MG/DL (ref 65–99)
HCT VFR BLD AUTO: 42.5 % (ref 42–52)
HGB BLD-MCNC: 13.8 G/DL (ref 14–18)
LYMPHOCYTES # BLD AUTO: 0.89 K/UL (ref 1–4.8)
LYMPHOCYTES NFR BLD: 6.9 % (ref 22–41)
MANUAL DIFF BLD: NORMAL
MCH RBC QN AUTO: 28.2 PG (ref 27–33)
MCHC RBC AUTO-ENTMCNC: 32.5 G/DL (ref 33.7–35.3)
MCV RBC AUTO: 86.7 FL (ref 81.4–97.8)
MONOCYTES # BLD AUTO: 0.34 K/UL (ref 0–0.85)
MONOCYTES NFR BLD AUTO: 2.6 % (ref 0–13.4)
MORPHOLOGY BLD-IMP: NORMAL
NEUTROPHILS # BLD AUTO: 10.11 K/UL (ref 1.82–7.42)
NEUTROPHILS NFR BLD: 78.4 % (ref 44–72)
NRBC # BLD AUTO: 0 K/UL
NRBC BLD-RTO: 0 /100 WBC
PLATELET # BLD AUTO: 383 K/UL (ref 164–446)
PLATELET BLD QL SMEAR: NORMAL
PMV BLD AUTO: 10.2 FL (ref 9–12.9)
POTASSIUM SERPL-SCNC: 3.9 MMOL/L (ref 3.6–5.5)
PROT SERPL-MCNC: 6.1 G/DL (ref 6–8.2)
RBC # BLD AUTO: 4.9 M/UL (ref 4.7–6.1)
RBC BLD AUTO: NORMAL
SIGNIFICANT IND 70042: NORMAL
SITE SITE: NORMAL
SODIUM SERPL-SCNC: 135 MMOL/L (ref 135–145)
SOURCE SOURCE: NORMAL
WBC # BLD AUTO: 12.9 K/UL (ref 4.8–10.8)

## 2021-03-18 PROCEDURE — 700102 HCHG RX REV CODE 250 W/ 637 OVERRIDE(OP): Performed by: STUDENT IN AN ORGANIZED HEALTH CARE EDUCATION/TRAINING PROGRAM

## 2021-03-18 PROCEDURE — A9270 NON-COVERED ITEM OR SERVICE: HCPCS | Performed by: STUDENT IN AN ORGANIZED HEALTH CARE EDUCATION/TRAINING PROGRAM

## 2021-03-18 PROCEDURE — 302109 OSTOMY WAFER 4X4: Performed by: FAMILY MEDICINE

## 2021-03-18 PROCEDURE — A9270 NON-COVERED ITEM OR SERVICE: HCPCS | Performed by: FAMILY MEDICINE

## 2021-03-18 PROCEDURE — 700102 HCHG RX REV CODE 250 W/ 637 OVERRIDE(OP): Performed by: FAMILY MEDICINE

## 2021-03-18 PROCEDURE — 97116 GAIT TRAINING THERAPY: CPT | Mod: CQ

## 2021-03-18 PROCEDURE — 85007 BL SMEAR W/DIFF WBC COUNT: CPT

## 2021-03-18 PROCEDURE — 97530 THERAPEUTIC ACTIVITIES: CPT | Mod: CQ

## 2021-03-18 PROCEDURE — 85027 COMPLETE CBC AUTOMATED: CPT

## 2021-03-18 PROCEDURE — 700102 HCHG RX REV CODE 250 W/ 637 OVERRIDE(OP): Performed by: HOSPITALIST

## 2021-03-18 PROCEDURE — 770001 HCHG ROOM/CARE - MED/SURG/GYN PRIV*

## 2021-03-18 PROCEDURE — 36415 COLL VENOUS BLD VENIPUNCTURE: CPT

## 2021-03-18 PROCEDURE — 99231 SBSQ HOSP IP/OBS SF/LOW 25: CPT | Performed by: FAMILY MEDICINE

## 2021-03-18 PROCEDURE — A9270 NON-COVERED ITEM OR SERVICE: HCPCS | Performed by: HOSPITALIST

## 2021-03-18 PROCEDURE — 51798 US URINE CAPACITY MEASURE: CPT

## 2021-03-18 PROCEDURE — 302043 TAPE, HYPAFIX: Performed by: FAMILY MEDICINE

## 2021-03-18 PROCEDURE — 82962 GLUCOSE BLOOD TEST: CPT | Mod: 91

## 2021-03-18 PROCEDURE — 97166 OT EVAL MOD COMPLEX 45 MIN: CPT

## 2021-03-18 PROCEDURE — 80053 COMPREHEN METABOLIC PANEL: CPT

## 2021-03-18 PROCEDURE — 700111 HCHG RX REV CODE 636 W/ 250 OVERRIDE (IP): Performed by: FAMILY MEDICINE

## 2021-03-18 RX ADMIN — SIMETHICONE 80 MG: 80 TABLET, CHEWABLE ORAL at 12:44

## 2021-03-18 RX ADMIN — INSULIN HUMAN 2 UNITS: 100 INJECTION, SOLUTION PARENTERAL at 20:26

## 2021-03-18 RX ADMIN — CEFAZOLIN SODIUM 2 G: 2 INJECTION, SOLUTION INTRAVENOUS at 04:23

## 2021-03-18 RX ADMIN — POTASSIUM CHLORIDE 20 MEQ: 20 TABLET, EXTENDED RELEASE ORAL at 04:18

## 2021-03-18 RX ADMIN — ZIPRASIDONE HYDROCHLORIDE 60 MG: 60 CAPSULE ORAL at 20:22

## 2021-03-18 RX ADMIN — MIDODRINE HYDROCHLORIDE 10 MG: 5 TABLET ORAL at 17:23

## 2021-03-18 RX ADMIN — CEFAZOLIN SODIUM 2 G: 2 INJECTION, SOLUTION INTRAVENOUS at 14:37

## 2021-03-18 RX ADMIN — TAMSULOSIN HYDROCHLORIDE 0.4 MG: 0.4 CAPSULE ORAL at 04:19

## 2021-03-18 RX ADMIN — LEVOTHYROXINE SODIUM 112 MCG: 0.11 TABLET ORAL at 04:18

## 2021-03-18 RX ADMIN — PREDNISONE 20 MG: 20 TABLET ORAL at 04:19

## 2021-03-18 RX ADMIN — DIGOXIN 125 MCG: 125 TABLET ORAL at 17:23

## 2021-03-18 RX ADMIN — SIMETHICONE 80 MG: 80 TABLET, CHEWABLE ORAL at 04:18

## 2021-03-18 RX ADMIN — OMEPRAZOLE 20 MG: 20 CAPSULE, DELAYED RELEASE ORAL at 04:19

## 2021-03-18 RX ADMIN — SIMETHICONE 80 MG: 80 TABLET, CHEWABLE ORAL at 17:36

## 2021-03-18 RX ADMIN — PAROXETINE HYDROCHLORIDE 20 MG: 20 TABLET, FILM COATED ORAL at 08:24

## 2021-03-18 RX ADMIN — MIDODRINE HYDROCHLORIDE 10 MG: 5 TABLET ORAL at 14:37

## 2021-03-18 RX ADMIN — INSULIN HUMAN 1 UNITS: 100 INJECTION, SOLUTION PARENTERAL at 12:42

## 2021-03-18 RX ADMIN — CEFAZOLIN SODIUM 2 G: 2 INJECTION, SOLUTION INTRAVENOUS at 20:58

## 2021-03-18 RX ADMIN — INSULIN HUMAN 2 UNITS: 100 INJECTION, SOLUTION PARENTERAL at 17:24

## 2021-03-18 RX ADMIN — CLONAZEPAM 0.5 MG: 0.5 TABLET ORAL at 08:23

## 2021-03-18 RX ADMIN — FLUDROCORTISONE ACETATE 0.2 MG: 0.1 TABLET ORAL at 04:19

## 2021-03-18 RX ADMIN — APIXABAN 5 MG: 5 TABLET, FILM COATED ORAL at 08:24

## 2021-03-18 RX ADMIN — DOCUSATE SODIUM 50 MG AND SENNOSIDES 8.6 MG 2 TABLET: 8.6; 5 TABLET, FILM COATED ORAL at 08:24

## 2021-03-18 RX ADMIN — MIDODRINE HYDROCHLORIDE 10 MG: 5 TABLET ORAL at 08:24

## 2021-03-18 RX ADMIN — METOPROLOL SUCCINATE 25 MG: 25 TABLET, EXTENDED RELEASE ORAL at 04:18

## 2021-03-18 RX ADMIN — INSULIN HUMAN 1 UNITS: 100 INJECTION, SOLUTION PARENTERAL at 08:27

## 2021-03-18 RX ADMIN — CLONAZEPAM 1 MG: 1 TABLET ORAL at 17:24

## 2021-03-18 RX ADMIN — APIXABAN 5 MG: 5 TABLET, FILM COATED ORAL at 20:22

## 2021-03-18 RX ADMIN — ATORVASTATIN CALCIUM 40 MG: 40 TABLET, FILM COATED ORAL at 17:24

## 2021-03-18 RX ADMIN — ZIPRASIDONE HYDROCHLORIDE 60 MG: 60 CAPSULE ORAL at 10:31

## 2021-03-18 ASSESSMENT — ENCOUNTER SYMPTOMS
NAUSEA: 0
BLURRED VISION: 0
FEVER: 0
HEARTBURN: 0
SHORTNESS OF BREATH: 0
NECK PAIN: 0
DEPRESSION: 1
NERVOUS/ANXIOUS: 0
DOUBLE VISION: 0
HEADACHES: 0
BRUISES/BLEEDS EASILY: 0
MYALGIAS: 0
DIZZINESS: 0

## 2021-03-18 ASSESSMENT — COGNITIVE AND FUNCTIONAL STATUS - GENERAL
STANDING UP FROM CHAIR USING ARMS: A LOT
WALKING IN HOSPITAL ROOM: A LOT
SUGGESTED CMS G CODE MODIFIER MOBILITY: CL
TOILETING: A LOT
MOVING FROM LYING ON BACK TO SITTING ON SIDE OF FLAT BED: A LOT
TURNING FROM BACK TO SIDE WHILE IN FLAT BAD: A LITTLE
MOBILITY SCORE: 12
MOBILITY SCORE: 12
WALKING IN HOSPITAL ROOM: A LOT
DRESSING REGULAR LOWER BODY CLOTHING: A LOT
PERSONAL GROOMING: A LITTLE
MOVING TO AND FROM BED TO CHAIR: A LOT
CLIMB 3 TO 5 STEPS WITH RAILING: TOTAL
DAILY ACTIVITIY SCORE: 14
DRESSING REGULAR UPPER BODY CLOTHING: A LITTLE
PERSONAL GROOMING: A LITTLE
DRESSING REGULAR LOWER BODY CLOTHING: A LOT
DAILY ACTIVITIY SCORE: 16
SUGGESTED CMS G CODE MODIFIER DAILY ACTIVITY: CK
MOVING FROM LYING ON BACK TO SITTING ON SIDE OF FLAT BED: UNABLE
SUGGESTED CMS G CODE MODIFIER MOBILITY: CL
HELP NEEDED FOR BATHING: A LOT
TOILETING: A LOT
DRESSING REGULAR UPPER BODY CLOTHING: A LOT
TURNING FROM BACK TO SIDE WHILE IN FLAT BAD: A LITTLE
STANDING UP FROM CHAIR USING ARMS: A LITTLE
MOVING TO AND FROM BED TO CHAIR: A LOT
SUGGESTED CMS G CODE MODIFIER DAILY ACTIVITY: CK
CLIMB 3 TO 5 STEPS WITH RAILING: TOTAL
EATING MEALS: A LITTLE
HELP NEEDED FOR BATHING: A LOT

## 2021-03-18 ASSESSMENT — GAIT ASSESSMENTS
DISTANCE (FEET): 5
ASSISTIVE DEVICE: 4 WHEEL WALKER
GAIT LEVEL OF ASSIST: MINIMAL ASSIST
DISTANCE (FEET): 5
DEVIATION: BRADYKINETIC;SHUFFLED GAIT

## 2021-03-18 ASSESSMENT — FIBROSIS 4 INDEX: FIB4 SCORE: 0.93

## 2021-03-18 ASSESSMENT — ACTIVITIES OF DAILY LIVING (ADL): TOILETING: INDEPENDENT

## 2021-03-18 NOTE — PROGRESS NOTES
Assumed care of Pt, He is alert and oriented. Discussed POC with day shift RN at bedside. Bed is locked in lowest position and call light/ belongings are within reach. Sitter is at bedside.

## 2021-03-18 NOTE — CARE PLAN
Problem: Communication  Goal: The ability to communicate needs accurately and effectively will improve  Outcome: PROGRESSING AS EXPECTED     Problem: Safety  Goal: Will remain free from injury  Outcome: PROGRESSING AS EXPECTED  Goal: Will remain free from falls  Outcome: PROGRESSING AS EXPECTED     Problem: Fluid Volume:  Goal: Will maintain balanced intake and output  Outcome: PROGRESSING AS EXPECTED     Problem: Pain Management  Goal: Pain level will decrease to patient's comfort goal  Outcome: PROGRESSING AS EXPECTED

## 2021-03-18 NOTE — DISCHARGE PLANNING
Received Order in Response to Request for Court Ordered Involuntary Admission from the court continuing pt until 3/25 at 0900. Sent copy to JAMES Mathur, scanned copy into media manager.

## 2021-03-18 NOTE — WOUND TEAM
[facility]  Inpatient Services  Initial Wound and Skin Care Evaluation    Admission Date: 2/9/2021     Last order of IP CONSULT TO WOUND CARE was found on 3/18/2021 from Hospital Encounter on 2/9/2021     HPI, PMH, SH: Reviewed    Past Surgical History:   Procedure Laterality Date   • COLECTOMY N/A 11/10/2020    Procedure: COLECTOMY-SEGMENTAL, COLOSTOMY, MOBILIZATION OF SPLENIC FLEXURE, WOUND VAC PLACEMENT, IRRIGATION AND DEBRIDMENT FLANK WOUND;  Surgeon: Kin Desouza D.O.;  [place];  Service: General   • ZZZ CARDIAC CATH  07/21/2018    EF 45%, normal coronaries.   • IRRIGATION & DEBRIDEMENT ORTHO Right 2/19/2018    Procedure: IRRIGATION & DEBRIDEMENT ORTHO-FOOT;  Surgeon: Kirby Lopez M.D.;  Location: [facility];  Service: Orthopedics   • TOE AMPUTATION Right 1/17/2018    Procedure: TOE AMPUTATION-1ST RAY;  Surgeon: Doug Delong M.D.;  Location: [facility];  Service: Orthopedics   • TOE AMPUTATION Right 11/10/2017    Procedure: TOE AMPUTATION;  Surgeon: Osorio Leo M.D.;  Location: [facility];  Service: Orthopedics     Social History     Tobacco Use   • Smoking status: Never Smoker   • Smokeless tobacco: Never Used   Substance Use Topics   • Alcohol use: No     Chief Complaint   Patient presents with   • Syncope     X1 this morning when standing up to go to bathroom     Diagnosis: Syncope and collapse [R55]  Suicidal ideation [R45.851]  Sepsis due to Klebsiella pneumoniae (HCC) [A41.4]    Unit where seen by Wound Team: T819/00     WOUND CONSULT/FOLLOW UP RELATED TO:  sacrum     WOUND HISTORY:  New consult for sacral injuries. Pt on legal hold for SI.    WOUND ASSESSMENT/LDA       Wound 03/17/21 Pressure Injury Thigh Posterior Right 3/18-DTI (Active)            03/18/21 0355   Site Assessment Red;Purple    Periwound Assessment Dry    Margins Defined edges    Closure None    Drainage Amount None    Treatments Site care    Wound  Cleansing Not Applicable    Periwound Protectant Not Applicable    Dressing Cleansing/Solutions Not Applicable    Dressing Options Open to Air    NEXT Weekly Photo (Inpatient Only) 03/25/21    Pressure Injury Stage DTPI    Wound Length (cm) 9.5 cm    Wound Width (cm) 1 cm    Wound Surface Area (cm^2) 9.5 cm^2    Tunneling (cm) 0 cm    Shape linear; 5 distincts lines    Wound Odor None    Exposed Structures None          Vascular:    SHAYAN:   No results found.    Lab Values:    Lab Results   Component Value Date/Time    WBC 12.9 (H) 03/18/2021 02:37 AM    RBC 4.90 03/18/2021 02:37 AM    HEMOGLOBIN 13.8 (L) 03/18/2021 02:37 AM    HEMATOCRIT 42.5 03/18/2021 02:37 AM    CREACTPROT 7.52 (H) 11/29/2020 01:25 AM    SEDRATEWES 13 10/23/2018 08:02 AM    HBA1C 5.2 02/14/2021 01:30 AM        Culture Results show:  No results found for this or any previous visit (from the past 720 hour(s)).    Pain Level/Medicated:  Denies       INTERVENTIONS BY WOUND TEAM:  Chart and images reviewed. Discussed with bedside RN. All areas of concern (based on picture review, LDA review and discussion with bedside RN) have been thoroughly assessed. Documentation of areas based on significant findings. This RN in to assess patient. Performed standard wound care which includes appropriate positioning, dressing removal and non-selective debridement. Pictures and measurements obtained weekly if/when required.  Preparation for Dressing removal: NA  Cleansed with:  NA  Sharp debridement: NA  Joya wound: NA  Primary Dressing: CHARLEY    Interdisciplinary consultation: Patient, Bedside RN, Margot wound RN     EVALUATION / RATIONALE FOR TREATMENT:  Most Recent Date:  3/18: Pt with linear DTIs to R posterior thigh from lying on top of frye cath tubing. Offloading precautions ordered. Wound team to continue following.     Goals: Steady decrease in wound area and depth weekly.    WOUND TEAM PLAN OF CARE ([X] for frequency of wound follow up,):   Nursing to  follow orders written for wound care. Contact wound team if area fails to progress, deteriorates or with any questions/concerns  Dressing changes by wound team:                   Follow up 3 times weekly:                NPWT change 3 times weekly:     Follow up 1-2 times weekly: x nursing to perform skin care; WT following      Follow up Bi-Monthly:                   Follow up as needed:     Other (explain):     NURSING PLAN OF CARE ORDERS (X):  Dressing changes: See Dressing Care orders:   Skin care: See Skin Care orders: x  RN Prevention Protocol: x  Rectal tube care: See Rectal Tube Care orders:   Other orders:    RSKIN:   CURRENTLY IN PLACE (X), APPLIED THIS VISIT (A), ORDERED (O):   Q shift Michael:  X  Q shift pressure point assessments:  X    Surface/Positioning   Pressure redistribution mattress            Low Airloss        X icu bed  Bariatric foam      Bariatric DARYL     Waffle cushion        Waffle Overlay        O  Reposition q 2 hours    O  TAPs Turning system   O  Z Teja Pillow     Offloading/Redistribution   Sacral Mepilex (Silicone dressing)   O  Heel Mepilex (Silicone dressing)         Heel float boots (Prevalon boot)             Float Heels off Bed with Pillows           Respiratory NA  Silicone O2 tubing         Gray Foam Ear protectors     Cannula fixation Device (Tender )          High flow offloading Clip    Elastic head band offloading device      Anchorfast                                                         Trach with Optifoam split foam             Containment/Moisture Prevention     Rectal tube or BMS    Purwick/Condom Cath        Zabala Catheter    Barrier wipes     x      Barrier paste       Antifungal tx      Interdry        Mobilization       Up to chair        Ambulate   x   PT/OT      Nutrition       Dietician        Diabetes Education      PO  x   TF     TPN     NPO   # days     Other        Anticipated discharge plans: TBD- pending psych transfer, may need continued  offloading  LTACH:        SNF/Rehab:                  Home Health Care:           Outpatient Wound Center:            Self/Family Care:        Other:

## 2021-03-18 NOTE — PROGRESS NOTES
[facility] Medicine Daily Progress Note    Date of Service  3/18/2021    Chief Complaint  56 y.o. male admitted 2/9/2021 with   Chief Complaint   Patient presents with   • Syncope     X1 this morning when standing up to go to bathroom         Hospital Course  Mr. Sebastián Castro is a 56 y.o. male history of atrial fibrillation secondary to hypercoagulable state, diabetes complicated by diabetic neuropathy, congestive heart failure, hyperlipidemia, orthostatic hypotension on fludrocortisone who presented on 2/9/2021 with syncopal episode.  Patient reported dizziness prior to syncope.  On presentation to the emergency room she was hypertensive and bradycardic.  Cardiology recommended stopping all AV sanket blocking agents.  He was also started on midodrine for hypotension.    The patient was started on digoxin and apixaban for atrial fibrillation.  Echocardiogram showed EF of 60%, improved from previous echocardiogram obtained on 11/2020.    Of note patient has history of a spleen injury complicated by perforation of splenic flexure in September 2020.  Status post colostomy and Interiano's pouch in November 2020.  He was at home taking care of his colostomy prior to most recent admission.  However he has become depressed, overwhelmed, and can no longer take care of his colostomy independently.    Psych continues to follow the patient; psychiatric medication as per psych recommendation.      Interval Problem Update  Febrile to 38.1 C overnight.   Developed hypersensitivity rash and itching due to zosyn.   Switched to ciprofloxacin and treated with benadryl.  BCx resulted as pan-susceptible Klebsiella pneumo.   Discussed with pharmacist Ashley and decided on bactrim.    He reports that the itching and rash are improving.  Gas is better today than it was yesterday.  Leg swelling feels better.  He denies dyspnea, pain, N/V, chills, dysuria, bowel dysfunction.      Emotionally he is having a hard time from feeling sick.  He  reports that staff has been consistently kind to him since yesterday.  Seen by psychiatrist Dr. Easley yesterday, no changes in medication.  3/16: Resting in bed comfortable.  Pruritic rash noted on upper extremities, chest, and neck.  Afebrile.  Hemodynamically stable.  Saturating well on room air.  Still admits to suicidal ideation.  Sitter at bedside.  No acute distress noted.  No issues overnight per staff.  3/17: Resting in bed comfortably. No acute distress noted.  Still have pleuritic rash all over.  Hemodynamically stable.  Saturating well on room air.  Sitter at bedside.  Still admits to suicidal ideation.  No acute distress noted.  No issues overnight per staff.  3/18: Resting in bed comfortably.  Erythema and rash much improved.  Hemodynamically stable.  Febrile.  Kidney functions improving.  Still admits to suicidal ideation.  Received training for ostomy self-care today.  No acute distress noted.  No issues overnight per staff.  Consultants/Specialty  Psychiatry - SI  Cardiology - Hypotension and Atrial Fibrillation    Code Status  Full Code    Disposition  To inpatient psychiatry pending improvement in sepsis, hypotension, and hypoxic respiratory failure.  Must be independently managing ostomy prior to transfer to psych facility.    Review of Systems  Review of Systems   Constitutional: Negative for fever.   HENT: Negative for ear pain.    Eyes: Negative for blurred vision and double vision.   Respiratory: Negative for shortness of breath.    Cardiovascular: Positive for leg swelling. Negative for chest pain.   Gastrointestinal: Negative for heartburn and nausea.   Genitourinary: Negative for dysuria and urgency.   Musculoskeletal: Negative for myalgias and neck pain.   Skin: Positive for itching and rash.   Neurological: Negative for dizziness and headaches.   Endo/Heme/Allergies: Does not bruise/bleed easily.   Psychiatric/Behavioral: Positive for depression. The patient is not  nervous/anxious.         Physical Exam  Temp:  [36.1 °C (97 °F)-36.9 °C (98.4 °F)] 36.3 °C (97.4 °F)  Pulse:  [71-91] 91  Resp:  [14-16] 14  BP: (107-142)/(54-76) 121/69  SpO2:  [91 %-95 %] 93 %    Physical Exam  Vitals and nursing note reviewed.   Constitutional:       General: He is not in acute distress.     Appearance: He is obese. Ill appearance: Appears fatigued.      Interventions: Nasal cannula in place.   HENT:      Head: Normocephalic and atraumatic.      Nose: Nose normal.      Mouth/Throat:      Mouth: Mucous membranes are moist.   Eyes:      General: No scleral icterus.     Conjunctiva/sclera: Conjunctivae normal.   Cardiovascular:      Rate and Rhythm: Normal rate and regular rhythm.      Pulses: Normal pulses.      Heart sounds: Normal heart sounds. No friction rub. No gallop.    Pulmonary:      Effort: Pulmonary effort is normal.      Breath sounds: Normal breath sounds. No wheezing, rhonchi or rales.   Abdominal:      General: Abdomen is flat. Bowel sounds are normal.      Palpations: Abdomen is soft.      Tenderness: There is no abdominal tenderness. There is no guarding or rebound.   Genitourinary:     Comments: Ostomy site dressed, nonerythematous, nontender.  Ostomy output watery / brown stool, some gas in bag but not tense.  Musculoskeletal:         General: Normal range of motion.      Cervical back: Neck supple.      Right lower leg: Edema present.      Left lower leg: Edema present.      Comments: 1+ edema to knees   Skin:     General: Skin is warm.      Findings: Erythema (Generalized.  Resolving) and rash (Resolving) present.      Comments: BLE chronic stasis dermatitis   Neurological:      Mental Status: He is alert.      Cranial Nerves: No cranial nerve deficit.      Comments: Appropriately conversant   Psychiatric:         Mood and Affect: Mood is depressed.         Speech: Speech normal.         Behavior: Behavior is cooperative.         Thought Content: Thought content includes  suicidal ideation.         Cognition and Memory: Cognition and memory normal.         Fluids    Intake/Output Summary (Last 24 hours) at 3/18/2021 1626  Last data filed at 3/17/2021 2022  Gross per 24 hour   Intake 240 ml   Output 835 ml   Net -595 ml       Laboratory  Recent Labs     03/16/21  0619 03/17/21 0315 03/18/21  0237   WBC 19.9* 13.5* 12.9*   RBC 5.37 4.92 4.90   HEMOGLOBIN 15.3 14.0 13.8*   HEMATOCRIT 45.9 41.8* 42.5   MCV 85.5 85.0 86.7   MCH 28.5 28.5 28.2   MCHC 33.3* 33.5* 32.5*   RDW 44.7 44.3 45.0   PLATELETCT 341 345 383   MPV 10.4 10.5 10.2     Recent Labs     03/16/21 0619 03/17/21 0315 03/18/21  0237   SODIUM 124* 132* 135   POTASSIUM 3.4* 3.4* 3.9   CHLORIDE 92* 98 100   CO2 22 24 25   GLUCOSE 135* 118* 174*   BUN 38* 48* 37*   CREATININE 1.50* 1.66* 1.22   CALCIUM 8.7 8.7 9.2                   Imaging  [id number] VIEW   Final Result         No significant interval change.      DX-CHEST-LIMITED (1 VIEW)   Final Result         Diffuse interstitial prominence could relate to mild pulmonary edema or atypical infection      [id number] VIEW   Final Result      Increased colonic gas without definite bowel obstruction.      US-RENAL   Final Result      No evidence of hydronephrosis.      Lobulated kidneys bilaterally.      CT-ABDOMEN-PELVIS WITH   Final Result      1.  There is no evidence of small bowel obstruction.   2.  There is a left lower quadrant ostomy.   3.  There is fluid distention of the colon distal to the surgical material in the left mid descending colon extending all the way to the rectum. There is no pneumatosis or free air.   4.  There is cholelithiasis without biliary dilatation.   5.  There has been interval removal of the drainage catheter anterior to the spleen with minimal hypodense area in the anterior spleen again noted. There is no new fluid collection.      IR-US GUIDED PIV   Final Result    Ultrasound-guided PERIPHERAL IV INSERTION performed by    qualified nursing  staff as above.      EC-ECHOCARDIOGRAM COMPLETE W/O CONT   Final Result      DX-LUMBAR SPINE-2 OR 3 VIEWS   Final Result      Moderate compression deformity of T11 is new compared to 2018.      Mild wedge deformity of T12 is unchanged.      Degenerative changes including facet arthropathy.      Mild retrolisthesis of L5 on S1 and L3 on L4.              Assessment/Plan  * Suicidal ideation- (present on admission)  Assessment & Plan  Has history of MDD, recurrent with psychotic features with active SI w/ plan.  Psychiatry consulted, continued legal hold  Legal hold until 3/18  To be discharged to psychiatric program pending medical clearance and when able to self-care for ostomy    Cardiogenic pulmonary edema (HCC)  Assessment & Plan  CXR demonstrates pulmonary vascular congestion / edema  NT-BNP 7828  Due to IV boluses for hypotension and N/V in setting of HFrEF with recovered EF  IV lasix tolerated with improvement in BP and Cr, discontinued 3/14 for severe sepsis and lactic acidosis  3/16: Currently saturating well on room air.    Major depressive disorder, recurrent, severe with psychotic features (HCC)- (present on admission)  Assessment & Plan  Psychiatry and Psychology following  Continue paroxetine, ziprasidone, and clonazepam    Secondary adrenal insufficiency (HCC)- (present on admission)  Assessment & Plan  Cosyntropin stimulation test reviewed with only a increase in his cortisol going from 14.5 then up to 21.3 after cosyntropin given  Renin 0.1, ACTH 3.4 and slight increase with cosyntropin suggesting secondary AI  Random cortisol 9.9  Needs outpatient endocrinology follow up.  Repeat adrenal function after discharge  Repeat AM BMP    Florinef restarted 3/14 at 0.2 mg daily (2x dose) due to severe sepsis from GNR bacteremia in order to prevent adrenal crisis    Sepsis with acute renal failure without septic shock (HCC)  Assessment & Plan  This is Severe Sepsis Not present on admission  SIRS criteria  identified on my evaluation include: Tachycardia, with heart rate greater than 90 BPM and Leukocytosis, with WBC greater than 12,000  Source of infection is GNR bacteremia  Clinical indicators of end organ dysfunction include Systolic blood pressure (SBP) <90 mmHg or mean arterial pressure <65 mmHg and Lactate >2 mmol/L (18.0 mg/dL)  Sepsis protocol initiated  Fluid resuscitation not ordered due to pulmonary edema seen on CXR due to HFrEF  IV antibiotics as appropriate for source of sepsis - Zosyn    BCx x2 (3/13) +GNR in one bottle, awaiting speciation and susceptbilities  UCx added to UA (3/13) that showed pyuria and bacteruria  Cdiff negative (3/12)  Prior microbiology date reviewed, h/o enterococcus and MRSA but currently not positive for GPCs    Reassessment: I have reassessed the patient's hemodynamic status  End organ dysfunction include(s):  Acute kidney failure (SRUTHI)  Cr and BP improved after diuresis yesterday for cardiogenic pulmonary edema  Lactic Acid 2.8, improved with holding diuresis      Orthostatic hypotension- (present on admission)  Assessment & Plan  Multifactorial including adrenal insufficiency, medications (beta blocker, CCB, tamsulosin), diabetic autonomic dysfunction, and venous insufficiency  EF is noted to be 60%, significant improvement from prior.  Per cardiology, no further cardiac work-up needed  Follow-up with cardiology as an outpatient.  Continue midodrine, PRN IVF    Bacteremia  Assessment & Plan  Due to klebsiella bacteremia  BCx: 1 of 2 bottles +Klebsiella  Cdiff negative  UA: Bacteruria and pyuria  UCx: In progress  CXR with pulmonary edema but no infiltrates  3/16: Currently on Ancef.        Abdominal bloating  Assessment & Plan  Improved  Had increasing gas output from ostomy  Intermittent episodes of abdominal pain and N/V  CT 3/4 did not demonstrate obstruction nor pneumotosis  KUB 3/12 for recurrent symptoms demonstrated increased gas but no signs of SBO  Simethicone  scheduled 80 mg TID  If uncontrolled, would confer with general surgeon about further imaging and discuss empiric treatment for SIBO    Normal anion gap metabolic acidosis  Assessment & Plan  Resolved  Due to bicarbonate loss via ostomy  Repeat AM BMP    Syncope due to orthostatic hypotension- (present on admission)  Assessment & Plan  Likely secondary to orthostatic hypotension  Echo normal with 60% ejection fraction, improved from prior    Paroxysmal atrial fibrillation (HCC)- (present on admission)  Assessment & Plan  CHADSVASC 3 (HTN, CHF, DM).   TTE Feb 2021: EF 60%  Continue metoprolol, digoxin, and eliquis  Discontinued telemetry    Chronic heart failure with preserved ejection fraction (HCC)- (present on admission)  Assessment & Plan  Decompensated based on peripheral and pulmonary edema, mild hyponatremia  IV diuresis initiated 3/13 for pulmonary edema, held 3/14 in setting of lactic acidosis from severe sepsis  Recovered EF 60% on repeat echo.  Continue metoprolol XL  Lisinopril held per Cardiology recommendations due to hypotension      Diabetes mellitus type 2, insulin dependent (HCC)- (present on admission)  Assessment & Plan  A1c 5.2  Diabetic diet  Restarting accuchecks and SSI due to GNR sepsis  BG goal <200 with bacteremia  Continue atorvastatin    Colostomy present (HCC)- (present on admission)  Assessment & Plan  History of fall at home Nov 2020 on left side with perforated colon and splenic fluid collection/hematoma s/p segmental colectomy, colostomy, mobilization of the splenic flexure, wound vac, and I&D Nov 2020.   Ostomy care consulted, but he was non-participatory  He is engaging in ostomy care with his bedside RNs, encouraged to be observed but do it independently  Patient must be independently able to care for his ostomy before inpatient psychiatry will accept him  Reversal timing and workup after 6 months per [facility]  3/16: Patient has to perform self ostomy care in order  to be transferred to inpatient psychiatric facility.  3/18: Patient to receive training for self ostomy care today.    Anxiety and depression- (present on admission)  Assessment & Plan  On Legal Hold until 3/18  Psychiatry consulted  Continue paroxetine, ziprasidone, and clonazepam  Would likely benefit from CBT or DBT  3/16: Psychiatry recommending transferring to inpatient psychiatric facility once medically cleared.  However patient has to perform self ostomy care which has been challenging.  Management and  working on best safe discharge plan.    Hypothyroidism- (present on admission)  Assessment & Plan  TSH wnl Feb 2021  Continue home Levothyroxine    Acute renal failure superimposed on stage 3 chronic kidney disease (HCC)- (present on admission)  Assessment & Plan  CKD 3  Had improved with increased midodrine and diuresis, but worsened this morning in setting of holding diuresis for hypotension and lactic acidosis  500 cc NS bolus today, repeat BMP this afternoon  Developed AHRF with pulmonary edema from 1 L bolus, judicious IVF  Diuresis held, continue midodrine  3/16: Discontinue Bactrim as it will worsen kidney functions. Slowly improving.  Avoid nephrotoxins.  Renally dose all medications.  3/18: Back to baseline.    Debility- (present on admission)  Assessment & Plan  Likely due to depression  PT/OT evaluated, no further skilled needs    Essential hypertension- (present on admission)  Assessment & Plan  Resolved, has been hypotensive this admission in setting of secondary adrenal insufficiency  Continue midodrine    Lower limb amputation, great toe (HCC)- (present on admission)  Assessment & Plan  s/p right great toe amputation 2018    Hypomagnesemia  Assessment & Plan  Mg WNL today  Repeat AM BMP/Mg    Hypokalemia  Assessment & Plan  K 3.2  Lasix held yesterday due to hypotension and lactic acidosis  Continue Kdur 20 daily  Discussed with pharmacy, no additional K today due to risk of  hyperkalemia with bactrim  Mg WNL  Repeat AM BMP/Mg    Hyponatremia  Assessment & Plan  Moderate, Na 127  In setting of IV lasix, holding lasix due to severe sepsis with lactic acidosis  500 cc NS trial today, repeat afternoon BMP  If worsening will get Delmis and Uosm  Repeat AM BMP    Obstructive uropathy- (present on admission)  Assessment & Plan  Urinary retention on bladder scans by straight cath.  Zabala catheter was placed temporarily.  Remove with voiding trials per nursing protocol.  Started on Flomax.    Rash- (present on admission)  Assessment & Plan  Patient developed pruritic drug related rash   Prn Benadryl, steroids   3/18: Resolving.    Chronic venous insufficiency of lower extremity- (present on admission)  Assessment & Plan  Compression stockings    Elevated troponin- (present on admission)  Assessment & Plan  RESOLVED  Stable. Suspect due to demand ischemia. No need to trend further.    Nonischemic cardiomyopathy (HCC)- (present on admission)  Assessment & Plan  Normal coronaries, 2018.  Tachycardia mediated per cardiology  LVEF improved, based on echo 2/10/2021 LVEF 60%    Chest pain- (present on admission)  Assessment & Plan  Resolved  EKG and Troponins not consistent with ACS    Mixed hyperlipidemia- (present on admission)  Assessment & Plan  Continue atorvastatin    Obesity- (present on admission)  Assessment & Plan  Body mass index is 37.79 kg/m².  Counseled about diet and exercise       VTE prophylaxis:  Eliquis

## 2021-03-18 NOTE — THERAPY
Physical Therapy   Daily Treatment     Patient Name: Sebastián Castro  Age:  56 y.o., Sex:  male  Medical Record #: [id number]  Today's Date: 3/18/2021     Precautions: Fall Risk    Assessment    Pt progressing well w/ therapy. Pt known to this therapist and at baseline was able to ambulate over 500 w/ the 4WW. Pt currently is significantly below his baseline. He required Min-ModA for all mobility. Pt w/ no c/o dizziness w/ seated or standing activities. He did have some posterior sway but no overt LOB w/ very short distance gait. Pt encourage to get OOB for all meals to start working back towards PLOF.    Plan    Continue current treatment plan.    DC Equipment Recommendations: None  Discharge Recommendations: Recommend post-acute placement for additional physical therapy services prior to discharge home      Subjective    Pt pleasant and cooperative.     Objective       03/18/21 1247   Precautions   Precautions Fall Risk   Comments Intermittent orthostatic hypotension.   Gait Analysis   Gait Level Of Assist Minimal Assist   Assistive Device 4 Wheel Walker   Distance (Feet) 5   # of Times Distance was Traveled 2   Deviation Bradykinetic;Shuffled Gait   Comments Pt w/ no signs or symptoms of dizziness.   Bed Mobility    Supine to Sit Minimal Assist   Sit to Supine Supervised   Scooting Minimal Assist   Rolling Supervised   Comments Pt needing assist to get trunk upright.   Functional Mobility   Sit to Stand Moderate Assist  (Leigh from bed, ModA from chair)   Bed, Chair, Wheelchair Transfer Minimal Assist   Transfer Method Stand Step   Mobility With 4WW   Short Term Goals    Short Term Goal # 1 Pt will perform supine<->sit with supv within 6 visits in order to return home   Goal Outcome # 1 goal not met   Short Term Goal # 2 Pt will transfer bed<->chair with 4WW with supv within 6 visits in order to return home   Goal Outcome # 2 Goal not met   Short Term Goal # 3 Pt will ambulate 100' with supv within 6 visits in order to  return home   Goal Outcome # 3 Goal not met   Short Term Goal # 4 Pt will ascend/descend 5 steps with supv within 6 visits in order to enter/exit home   Goal Outcome # 4 Goal not met

## 2021-03-18 NOTE — CARE PLAN
Problem: Communication  Goal: The ability to communicate needs accurately and effectively will improve  Outcome: PROGRESSING AS EXPECTED     Problem: Safety  Goal: Will remain free from falls  Outcome: PROGRESSING AS EXPECTED  Note: Fall precautions in place. Bed in lowest position. Non-skid socks in place. Personal possessions within reach. Mobility sign on door. Bed-alarm on. Call light within reach. Pt educated regarding fall prevention and states understanding.        Problem: Knowledge Deficit  Goal: Knowledge of disease process/condition, treatment plan, diagnostic tests, and medications will improve  Outcome: PROGRESSING AS EXPECTED

## 2021-03-19 LAB
ALBUMIN SERPL BCP-MCNC: 2.8 G/DL (ref 3.2–4.9)
ALBUMIN/GLOB SERPL: 0.8 G/DL
ALP SERPL-CCNC: 143 U/L (ref 30–99)
ALT SERPL-CCNC: 14 U/L (ref 2–50)
ANION GAP SERPL CALC-SCNC: 10 MMOL/L (ref 7–16)
AST SERPL-CCNC: 22 U/L (ref 12–45)
BASOPHILS # BLD AUTO: 0 % (ref 0–1.8)
BASOPHILS # BLD: 0 K/UL (ref 0–0.12)
BILIRUB SERPL-MCNC: 0.2 MG/DL (ref 0.1–1.5)
BUN SERPL-MCNC: 32 MG/DL (ref 8–22)
CALCIUM SERPL-MCNC: 9.1 MG/DL (ref 8.5–10.5)
CHLORIDE SERPL-SCNC: 100 MMOL/L (ref 96–112)
CO2 SERPL-SCNC: 25 MMOL/L (ref 20–33)
CREAT SERPL-MCNC: 0.92 MG/DL (ref 0.5–1.4)
EOSINOPHIL # BLD AUTO: 0 K/UL (ref 0–0.51)
EOSINOPHIL NFR BLD: 0 % (ref 0–6.9)
ERYTHROCYTE [DISTWIDTH] IN BLOOD BY AUTOMATED COUNT: 45.1 FL (ref 35.9–50)
GLOBULIN SER CALC-MCNC: 3.5 G/DL (ref 1.9–3.5)
GLUCOSE BLD-MCNC: 178 MG/DL (ref 65–99)
GLUCOSE BLD-MCNC: 203 MG/DL (ref 65–99)
GLUCOSE BLD-MCNC: 207 MG/DL (ref 65–99)
GLUCOSE BLD-MCNC: 217 MG/DL (ref 65–99)
GLUCOSE SERPL-MCNC: 208 MG/DL (ref 65–99)
HCT VFR BLD AUTO: 41 % (ref 42–52)
HGB BLD-MCNC: 13.5 G/DL (ref 14–18)
LYMPHOCYTES # BLD AUTO: 3.86 K/UL (ref 1–4.8)
LYMPHOCYTES NFR BLD: 23.1 % (ref 22–41)
MANUAL DIFF BLD: NORMAL
MCH RBC QN AUTO: 28.5 PG (ref 27–33)
MCHC RBC AUTO-ENTMCNC: 32.9 G/DL (ref 33.7–35.3)
MCV RBC AUTO: 86.7 FL (ref 81.4–97.8)
MONOCYTES # BLD AUTO: 0.57 K/UL (ref 0–0.85)
MONOCYTES NFR BLD AUTO: 3.4 % (ref 0–13.4)
MORPHOLOGY BLD-IMP: NORMAL
NEUTROPHILS # BLD AUTO: 12.27 K/UL (ref 1.82–7.42)
NEUTROPHILS NFR BLD: 73.5 % (ref 44–72)
NRBC # BLD AUTO: 0 K/UL
NRBC BLD-RTO: 0 /100 WBC
PLATELET # BLD AUTO: 434 K/UL (ref 164–446)
PLATELET BLD QL SMEAR: NORMAL
PMV BLD AUTO: 10.8 FL (ref 9–12.9)
POTASSIUM SERPL-SCNC: 3.7 MMOL/L (ref 3.6–5.5)
PROT SERPL-MCNC: 6.3 G/DL (ref 6–8.2)
RBC # BLD AUTO: 4.73 M/UL (ref 4.7–6.1)
RBC BLD AUTO: NORMAL
SODIUM SERPL-SCNC: 135 MMOL/L (ref 135–145)
WBC # BLD AUTO: 16.7 K/UL (ref 4.8–10.8)

## 2021-03-19 PROCEDURE — 770001 HCHG ROOM/CARE - MED/SURG/GYN PRIV*

## 2021-03-19 PROCEDURE — 700102 HCHG RX REV CODE 250 W/ 637 OVERRIDE(OP): Performed by: FAMILY MEDICINE

## 2021-03-19 PROCEDURE — 99231 SBSQ HOSP IP/OBS SF/LOW 25: CPT | Performed by: FAMILY MEDICINE

## 2021-03-19 PROCEDURE — 700102 HCHG RX REV CODE 250 W/ 637 OVERRIDE(OP): Performed by: STUDENT IN AN ORGANIZED HEALTH CARE EDUCATION/TRAINING PROGRAM

## 2021-03-19 PROCEDURE — A9270 NON-COVERED ITEM OR SERVICE: HCPCS | Performed by: STUDENT IN AN ORGANIZED HEALTH CARE EDUCATION/TRAINING PROGRAM

## 2021-03-19 PROCEDURE — 700111 HCHG RX REV CODE 636 W/ 250 OVERRIDE (IP): Performed by: FAMILY MEDICINE

## 2021-03-19 PROCEDURE — 82962 GLUCOSE BLOOD TEST: CPT | Mod: 91

## 2021-03-19 PROCEDURE — A9270 NON-COVERED ITEM OR SERVICE: HCPCS | Performed by: FAMILY MEDICINE

## 2021-03-19 PROCEDURE — 80053 COMPREHEN METABOLIC PANEL: CPT

## 2021-03-19 PROCEDURE — 700102 HCHG RX REV CODE 250 W/ 637 OVERRIDE(OP): Performed by: HOSPITALIST

## 2021-03-19 PROCEDURE — 85027 COMPLETE CBC AUTOMATED: CPT

## 2021-03-19 PROCEDURE — A9270 NON-COVERED ITEM OR SERVICE: HCPCS | Performed by: HOSPITALIST

## 2021-03-19 PROCEDURE — 85007 BL SMEAR W/DIFF WBC COUNT: CPT

## 2021-03-19 PROCEDURE — 90832 PSYTX W PT 30 MINUTES: CPT | Performed by: PSYCHOLOGIST

## 2021-03-19 PROCEDURE — 51798 US URINE CAPACITY MEASURE: CPT

## 2021-03-19 PROCEDURE — 36415 COLL VENOUS BLD VENIPUNCTURE: CPT

## 2021-03-19 RX ORDER — SULFAMETHOXAZOLE AND TRIMETHOPRIM 800; 160 MG/1; MG/1
1 TABLET ORAL EVERY 12 HOURS
Status: COMPLETED | OUTPATIENT
Start: 2021-03-19 | End: 2021-03-23

## 2021-03-19 RX ORDER — FLUDROCORTISONE ACETATE 0.1 MG/1
0.1 TABLET ORAL EVERY MORNING
Status: DISCONTINUED | OUTPATIENT
Start: 2021-03-20 | End: 2021-06-05

## 2021-03-19 RX ORDER — INSULIN GLARGINE 100 [IU]/ML
5 INJECTION, SOLUTION SUBCUTANEOUS
Status: DISCONTINUED | OUTPATIENT
Start: 2021-03-19 | End: 2021-05-13

## 2021-03-19 RX ORDER — MIDODRINE HYDROCHLORIDE 5 MG/1
5 TABLET ORAL
Status: DISCONTINUED | OUTPATIENT
Start: 2021-03-19 | End: 2021-04-13

## 2021-03-19 RX ADMIN — INSULIN GLARGINE 5 UNITS: 100 INJECTION, SOLUTION SUBCUTANEOUS at 10:42

## 2021-03-19 RX ADMIN — DOCUSATE SODIUM 50 MG AND SENNOSIDES 8.6 MG 2 TABLET: 8.6; 5 TABLET, FILM COATED ORAL at 08:29

## 2021-03-19 RX ADMIN — ATORVASTATIN CALCIUM 40 MG: 40 TABLET, FILM COATED ORAL at 17:37

## 2021-03-19 RX ADMIN — SIMETHICONE 80 MG: 80 TABLET, CHEWABLE ORAL at 13:56

## 2021-03-19 RX ADMIN — INSULIN HUMAN 2 UNITS: 100 INJECTION, SOLUTION PARENTERAL at 13:56

## 2021-03-19 RX ADMIN — TAMSULOSIN HYDROCHLORIDE 0.4 MG: 0.4 CAPSULE ORAL at 05:42

## 2021-03-19 RX ADMIN — APIXABAN 5 MG: 5 TABLET, FILM COATED ORAL at 21:02

## 2021-03-19 RX ADMIN — OMEPRAZOLE 20 MG: 20 CAPSULE, DELAYED RELEASE ORAL at 05:42

## 2021-03-19 RX ADMIN — SIMETHICONE 80 MG: 80 TABLET, CHEWABLE ORAL at 05:41

## 2021-03-19 RX ADMIN — INSULIN HUMAN 2 UNITS: 100 INJECTION, SOLUTION PARENTERAL at 21:02

## 2021-03-19 RX ADMIN — CLONAZEPAM 0.5 MG: 0.5 TABLET ORAL at 08:29

## 2021-03-19 RX ADMIN — MIDODRINE HYDROCHLORIDE 5 MG: 5 TABLET ORAL at 13:56

## 2021-03-19 RX ADMIN — APIXABAN 5 MG: 5 TABLET, FILM COATED ORAL at 08:30

## 2021-03-19 RX ADMIN — DOCUSATE SODIUM 50 MG AND SENNOSIDES 8.6 MG 2 TABLET: 8.6; 5 TABLET, FILM COATED ORAL at 21:02

## 2021-03-19 RX ADMIN — PAROXETINE HYDROCHLORIDE 20 MG: 20 TABLET, FILM COATED ORAL at 08:29

## 2021-03-19 RX ADMIN — ZIPRASIDONE HYDROCHLORIDE 60 MG: 60 CAPSULE ORAL at 08:29

## 2021-03-19 RX ADMIN — INSULIN HUMAN 1 UNITS: 100 INJECTION, SOLUTION PARENTERAL at 08:32

## 2021-03-19 RX ADMIN — METOPROLOL SUCCINATE 25 MG: 25 TABLET, EXTENDED RELEASE ORAL at 05:42

## 2021-03-19 RX ADMIN — FLUDROCORTISONE ACETATE 0.2 MG: 0.1 TABLET ORAL at 05:41

## 2021-03-19 RX ADMIN — PREDNISONE 20 MG: 20 TABLET ORAL at 05:42

## 2021-03-19 RX ADMIN — SULFAMETHOXAZOLE AND TRIMETHOPRIM 1 TABLET: 800; 160 TABLET ORAL at 17:37

## 2021-03-19 RX ADMIN — CEFAZOLIN SODIUM 2 G: 2 INJECTION, SOLUTION INTRAVENOUS at 05:42

## 2021-03-19 RX ADMIN — CEFAZOLIN SODIUM 2 G: 2 INJECTION, SOLUTION INTRAVENOUS at 13:56

## 2021-03-19 RX ADMIN — DIGOXIN 125 MCG: 125 TABLET ORAL at 17:37

## 2021-03-19 RX ADMIN — CLONAZEPAM 1 MG: 1 TABLET ORAL at 17:38

## 2021-03-19 RX ADMIN — LEVOTHYROXINE SODIUM 112 MCG: 0.11 TABLET ORAL at 05:42

## 2021-03-19 RX ADMIN — MIDODRINE HYDROCHLORIDE 5 MG: 5 TABLET ORAL at 17:37

## 2021-03-19 RX ADMIN — INSULIN HUMAN 2 UNITS: 100 INJECTION, SOLUTION PARENTERAL at 17:41

## 2021-03-19 RX ADMIN — SIMETHICONE 80 MG: 80 TABLET, CHEWABLE ORAL at 17:37

## 2021-03-19 RX ADMIN — ZIPRASIDONE HYDROCHLORIDE 60 MG: 60 CAPSULE ORAL at 21:02

## 2021-03-19 RX ADMIN — POTASSIUM CHLORIDE 20 MEQ: 20 TABLET, EXTENDED RELEASE ORAL at 05:42

## 2021-03-19 ASSESSMENT — ENCOUNTER SYMPTOMS
NERVOUS/ANXIOUS: 0
BLURRED VISION: 0
NAUSEA: 0
HEADACHES: 0
CHILLS: 0
HEARTBURN: 0
SHORTNESS OF BREATH: 0
FEVER: 0
DOUBLE VISION: 0
MYALGIAS: 0
DEPRESSION: 1
DIZZINESS: 0
BRUISES/BLEEDS EASILY: 0

## 2021-03-19 ASSESSMENT — FIBROSIS 4 INDEX: FIB4 SCORE: 0.79

## 2021-03-19 NOTE — CARE PLAN
Problem: Safety  Goal: Will remain free from injury  Outcome: PROGRESSING AS EXPECTED  Goal: Will remain free from falls  Outcome: PROGRESSING AS EXPECTED     Problem: Venous Thromboembolism (VTW)/Deep Vein Thrombosis (DVT) Prevention:  Goal: Patient will participate in Venous Thrombosis (VTE)/Deep Vein Thrombosis (DVT)Prevention Measures  Outcome: PROGRESSING AS EXPECTED     Problem: Knowledge Deficit  Goal: Knowledge of disease process/condition, treatment plan, diagnostic tests, and medications will improve  Outcome: PROGRESSING SLOWER THAN EXPECTED  Note: Patient still expressing still not certain about ostomy care. Further education needed.     Problem: Mobility  Goal: Risk for activity intolerance will decrease  Outcome: PROGRESSING SLOWER THAN EXPECTED  Note: Patient is x2 assist, unable to ambulate independently. Generalized weakness

## 2021-03-19 NOTE — PROGRESS NOTES
Bedside report received. Bed locked and in low position, safety sitter at bedside. Hourly rounding in place. No further needs at this time. Room cleared of all hazardous equipment.

## 2021-03-19 NOTE — PROGRESS NOTES
Patient encouraged to change his own ostomy bag. Patient eager to learn and perform correctly. Patient needed coaching and help throughout the process. Still unable to do it fully on his own. Patient has the most trouble visualizing stoma and aligning bag. Will continue to work with patient.

## 2021-03-19 NOTE — PROGRESS NOTES
Patient encouraged to empty ostomy bag on his own. Patient performed with coaching. Limited help required.

## 2021-03-19 NOTE — CARE PLAN
Problem: Communication  Goal: The ability to communicate needs accurately and effectively will improve  Outcome: PROGRESSING AS EXPECTED     Problem: Safety  Goal: Will remain free from injury  Outcome: PROGRESSING AS EXPECTED     Problem: Psychosocial Needs:  Goal: Level of anxiety will decrease  Outcome: PROGRESSING AS EXPECTED     Problem: Potential for Self Harm  Goal: No active self-harm  Outcome: PROGRESSING AS EXPECTED     Problem: Ineffective Coping  Goal: No active self-harm  Outcome: PROGRESSING AS EXPECTED     Problem: Urinary Elimination:  Goal: Ability to reestablish a normal urinary elimination pattern will improve  Outcome: PROGRESSING SLOWER THAN EXPECTED

## 2021-03-19 NOTE — PROGRESS NOTES
[facility] Medicine Daily Progress Note    Date of Service  3/19/2021    Chief Complaint  56 y.o. male admitted 2/9/2021 with   Chief Complaint   Patient presents with   • Syncope     X1 this morning when standing up to go to bathroom         Hospital Course  Mr. Sebastián Castro is a 56 y.o. male history of atrial fibrillation secondary to hypercoagulable state, diabetes complicated by diabetic neuropathy, congestive heart failure, hyperlipidemia, orthostatic hypotension on fludrocortisone who presented on 2/9/2021 with syncopal episode.  Patient reported dizziness prior to syncope.  On presentation to the emergency room she was hypertensive and bradycardic.  Cardiology recommended stopping all AV sanket blocking agents.  He was also started on midodrine for hypotension.    The patient was started on digoxin and apixaban for atrial fibrillation.  Echocardiogram showed EF of 60%, improved from previous echocardiogram obtained on 11/2020.    Of note patient has history of a spleen injury complicated by perforation of splenic flexure in September 2020.  Status post colostomy and Interiano's pouch in November 2020.  He was at home taking care of his colostomy prior to most recent admission.  However he has become depressed, overwhelmed, and can no longer take care of his colostomy independently.    Psych continues to follow the patient; psychiatric medication as per psych recommendation.      Interval Problem Update  Febrile to 38.1 C overnight.   Developed hypersensitivity rash and itching due to zosyn.   Switched to ciprofloxacin and treated with benadryl.  BCx resulted as pan-susceptible Klebsiella pneumo.   Discussed with pharmacist Ashley and decided on bactrim.    He reports that the itching and rash are improving.  Gas is better today than it was yesterday.  Leg swelling feels better.  He denies dyspnea, pain, N/V, chills, dysuria, bowel dysfunction.      Emotionally he is having a hard time from feeling sick.  He  reports that staff has been consistently kind to him since yesterday.  Seen by psychiatrist Dr. Easley yesterday, no changes in medication.  3/16: Resting in bed comfortable.  Pruritic rash noted on upper extremities, chest, and neck.  Afebrile.  Hemodynamically stable.  Saturating well on room air.  Still admits to suicidal ideation.  Sitter at bedside.  No acute distress noted.  No issues overnight per staff.  3/17: Resting in bed comfortably. No acute distress noted.  Still have pleuritic rash all over.  Hemodynamically stable.  Saturating well on room air.  Sitter at bedside.  Still admits to suicidal ideation.  No acute distress noted.  No issues overnight per staff.  3/18: Resting in bed comfortably.  Erythema and rash much improved.  Hemodynamically stable.  Febrile.  Kidney functions improving.  Still admits to suicidal ideation.  Received training for ostomy self-care today.  No acute distress noted.  No issues overnight per staff.  3/19: Resting in bed comfortably.  Rash much improved.  Pruritus much improved as well.  Zabala catheter was removed with successful voiding trials.  Kidney function is much improved.  Hemodynamically stable.  Afebrile.  Still suicidal.  Received training for colostomy self-care yesterday and will continue today.  No acute distress noted.  No issues overnight per staff.  Consultants/Specialty  Psychiatry - SI  Cardiology - Hypotension and Atrial Fibrillation    Code Status  Full Code    Disposition  To inpatient psychiatry pending improvement in sepsis, hypotension, and hypoxic respiratory failure.  Must be independently managing ostomy prior to transfer to psych facility.    Review of Systems  Review of Systems   Constitutional: Negative for chills and fever.   HENT: Negative for ear pain.    Eyes: Negative for blurred vision and double vision.   Respiratory: Negative for shortness of breath.    Cardiovascular: Negative for chest pain.   Gastrointestinal: Negative for  heartburn and nausea.   Genitourinary: Negative for dysuria and urgency.   Musculoskeletal: Negative for myalgias.   Skin: Positive for itching and rash.   Neurological: Negative for dizziness and headaches.   Endo/Heme/Allergies: Does not bruise/bleed easily.   Psychiatric/Behavioral: Positive for depression and suicidal ideas. The patient is not nervous/anxious.         Physical Exam  Temp:  [36 °C (96.8 °F)-36.3 °C (97.3 °F)] 36.3 °C (97.3 °F)  Pulse:  [60-77] 60  Resp:  [16] 16  BP: (126-162)/(70-75) 162/75  SpO2:  [92 %-95 %] 95 %    Physical Exam  Vitals and nursing note reviewed.   Constitutional:       General: He is not in acute distress.     Appearance: He is obese. Ill appearance: Appears fatigued.      Interventions: Nasal cannula in place.   HENT:      Head: Normocephalic and atraumatic.      Nose: Nose normal.      Mouth/Throat:      Mouth: Mucous membranes are moist.   Eyes:      General: No scleral icterus.     Conjunctiva/sclera: Conjunctivae normal.   Cardiovascular:      Rate and Rhythm: Normal rate and regular rhythm.      Pulses: Normal pulses.      Heart sounds: Normal heart sounds. No friction rub. No gallop.    Pulmonary:      Effort: Pulmonary effort is normal.      Breath sounds: Normal breath sounds. No wheezing or rhonchi.   Abdominal:      General: Abdomen is flat. Bowel sounds are normal.      Palpations: Abdomen is soft.      Tenderness: There is no abdominal tenderness. There is no guarding or rebound.   Genitourinary:     Comments: Ostomy site dressed, nonerythematous, nontender.  Ostomy output watery / brown stool, some gas in bag but not tense.  Musculoskeletal:         General: Normal range of motion.      Cervical back: Neck supple.      Comments: 1+ edema to knees   Skin:     General: Skin is warm.      Findings: Erythema (Generalized.  Resolving) and rash (Resolving) present.      Comments: BLE chronic stasis dermatitis   Neurological:      Mental Status: He is alert.       Cranial Nerves: No cranial nerve deficit.      Comments: Appropriately conversant   Psychiatric:         Mood and Affect: Mood is depressed.         Speech: Speech normal.         Behavior: Behavior is cooperative.         Thought Content: Thought content includes suicidal ideation.         Cognition and Memory: Cognition and memory normal.         Fluids    Intake/Output Summary (Last 24 hours) at 3/19/2021 1426  Last data filed at 3/19/2021 0830  Gross per 24 hour   Intake 440 ml   Output 2240 ml   Net -1800 ml       Laboratory  Recent Labs     03/17/21 0315 03/18/21 0237 03/19/21  0335   WBC 13.5* 12.9* 16.7*   RBC 4.92 4.90 4.73   HEMOGLOBIN 14.0 13.8* 13.5*   HEMATOCRIT 41.8* 42.5 41.0*   MCV 85.0 86.7 86.7   MCH 28.5 28.2 28.5   MCHC 33.5* 32.5* 32.9*   RDW 44.3 45.0 45.1   PLATELETCT 345 383 434   MPV 10.5 10.2 10.8     Recent Labs     03/17/21 0315 03/18/21 0237 03/19/21  0335   SODIUM 132* 135 135   POTASSIUM 3.4* 3.9 3.7   CHLORIDE 98 100 100   CO2 24 25 25   GLUCOSE 118* 174* 208*   BUN 48* 37* 32*   CREATININE 1.66* 1.22 0.92   CALCIUM 8.7 9.2 9.1                   Imaging  [id number] VIEW   Final Result         No significant interval change.      DX-CHEST-LIMITED (1 VIEW)   Final Result         Diffuse interstitial prominence could relate to mild pulmonary edema or atypical infection      [id number] VIEW   Final Result      Increased colonic gas without definite bowel obstruction.      US-RENAL   Final Result      No evidence of hydronephrosis.      Lobulated kidneys bilaterally.      CT-ABDOMEN-PELVIS WITH   Final Result      1.  There is no evidence of small bowel obstruction.   2.  There is a left lower quadrant ostomy.   3.  There is fluid distention of the colon distal to the surgical material in the left mid descending colon extending all the way to the rectum. There is no pneumatosis or free air.   4.  There is cholelithiasis without biliary dilatation.   5.  There has been interval  removal of the drainage catheter anterior to the spleen with minimal hypodense area in the anterior spleen again noted. There is no new fluid collection.      IR-US GUIDED PIV   Final Result    Ultrasound-guided PERIPHERAL IV INSERTION performed by    qualified nursing staff as above.      EC-ECHOCARDIOGRAM COMPLETE W/O CONT   Final Result      DX-LUMBAR SPINE-2 OR 3 VIEWS   Final Result      Moderate compression deformity of T11 is new compared to 2018.      Mild wedge deformity of T12 is unchanged.      Degenerative changes including facet arthropathy.      Mild retrolisthesis of L5 on S1 and L3 on L4.              Assessment/Plan  * Suicidal ideation- (present on admission)  Assessment & Plan  Has history of MDD, recurrent with psychotic features with active SI w/ plan.  Psychiatry consulted, continued legal hold  Legal hold until 3/18  To be discharged to psychiatric program pending medical clearance and when able to self-care for ostomy    Cardiogenic pulmonary edema (HCC)  Assessment & Plan  CXR demonstrates pulmonary vascular congestion / edema  NT-BNP 7828  Due to IV boluses for hypotension and N/V in setting of HFrEF with recovered EF  IV lasix tolerated with improvement in BP and Cr, discontinued 3/14 for severe sepsis and lactic acidosis  3/16: Currently saturating well on room air.    Major depressive disorder, recurrent, severe with psychotic features (HCC)- (present on admission)  Assessment & Plan  Psychiatry and Psychology following  Continue paroxetine, ziprasidone, and clonazepam    Secondary adrenal insufficiency (HCC)- (present on admission)  Assessment & Plan  Cosyntropin stimulation test reviewed with only a increase in his cortisol going from 14.5 then up to 21.3 after cosyntropin given  Renin 0.1, ACTH 3.4 and slight increase with cosyntropin suggesting secondary AI  Random cortisol 9.9  Needs outpatient endocrinology follow up.  Repeat adrenal function after discharge  Repeat AM  BMP    Cristinaf restarted 3/14 at 0.2 mg daily (2x dose) due to severe sepsis from GNR bacteremia in order to prevent adrenal crisis    Sepsis with acute renal failure without septic shock (HCC)  Assessment & Plan  This is Severe Sepsis Not present on admission  SIRS criteria identified on my evaluation include: Tachycardia, with heart rate greater than 90 BPM and Leukocytosis, with WBC greater than 12,000  Source of infection is GNR bacteremia  Clinical indicators of end organ dysfunction include Systolic blood pressure (SBP) <90 mmHg or mean arterial pressure <65 mmHg and Lactate >2 mmol/L (18.0 mg/dL)  Sepsis protocol initiated  Fluid resuscitation not ordered due to pulmonary edema seen on CXR due to HFrEF  IV antibiotics as appropriate for source of sepsis - Zosyn    BCx x2 (3/13) +GNR in one bottle, awaiting speciation and susceptbilities  UCx added to UA (3/13) that showed pyuria and bacteruria  Cdiff negative (3/12)  Prior microbiology date reviewed, h/o enterococcus and MRSA but currently not positive for GPCs    Reassessment: I have reassessed the patient's hemodynamic status  End organ dysfunction include(s):  Acute kidney failure (SRUTHI)  Cr and BP improved after diuresis yesterday for cardiogenic pulmonary edema  Lactic Acid 2.8, improved with holding diuresis      Orthostatic hypotension- (present on admission)  Assessment & Plan  Multifactorial including adrenal insufficiency, medications (beta blocker, CCB, tamsulosin), diabetic autonomic dysfunction, and venous insufficiency  EF is noted to be 60%, significant improvement from prior.  Per cardiology, no further cardiac work-up needed  Follow-up with cardiology as an outpatient.  Continue midodrine, PRN IVF    Bacteremia  Assessment & Plan  Due to klebsiella bacteremia  BCx: 1 of 2 bottles +Klebsiella  Cdiff negative  UA: Bacteruria and pyuria  UCx: In progress  CXR with pulmonary edema but no infiltrates  3/16: Currently on Ancef.  3/19: Switch him to  Bactrim to complete a 10-day course.        Abdominal bloating  Assessment & Plan  Improved  Had increasing gas output from ostomy  Intermittent episodes of abdominal pain and N/V  CT 3/4 did not demonstrate obstruction nor pneumotosis  KUB 3/12 for recurrent symptoms demonstrated increased gas but no signs of SBO  Simethicone scheduled 80 mg TID  If uncontrolled, would confer with general surgeon about further imaging and discuss empiric treatment for SIBO    Normal anion gap metabolic acidosis  Assessment & Plan  Resolved  Due to bicarbonate loss via ostomy  Repeat AM BMP    Syncope due to orthostatic hypotension- (present on admission)  Assessment & Plan  Likely secondary to orthostatic hypotension  Echo normal with 60% ejection fraction, improved from prior    Paroxysmal atrial fibrillation (HCC)- (present on admission)  Assessment & Plan  CHADSVASC 3 (HTN, CHF, DM).   TTE Feb 2021: EF 60%  Continue metoprolol, digoxin, and eliquis  Discontinued telemetry    Chronic heart failure with preserved ejection fraction (HCC)- (present on admission)  Assessment & Plan  Decompensated based on peripheral and pulmonary edema, mild hyponatremia  IV diuresis initiated 3/13 for pulmonary edema, held 3/14 in setting of lactic acidosis from severe sepsis  Recovered EF 60% on repeat echo.  Continue metoprolol XL  Lisinopril held per Cardiology recommendations due to hypotension      Diabetes mellitus type 2, insulin dependent (HCC)- (present on admission)  Assessment & Plan  A1c 5.2  Diabetic diet  Restarting accuchecks and SSI due to GNR sepsis  BG goal <200 with bacteremia  Continue atorvastatin    Colostomy present (HCC)- (present on admission)  Assessment & Plan  History of fall at home Nov 2020 on left side with perforated colon and splenic fluid collection/hematoma s/p segmental colectomy, colostomy, mobilization of the splenic flexure, wound vac, and I&D Nov 2020.   Ostomy care consulted, but he was non-participatory  He  is engaging in ostomy care with his bedside RNs, encouraged to be observed but do it independently  Patient must be independently able to care for his ostomy before inpatient psychiatry will accept him  Reversal timing and workup after 6 months per [facility]  3/16: Patient has to perform self ostomy care in order to be transferred to inpatient psychiatric facility.  3/18: Patient to receive training for self ostomy care today.    Anxiety and depression- (present on admission)  Assessment & Plan  On Legal Hold until 3/18  Psychiatry consulted  Continue paroxetine, ziprasidone, and clonazepam  Would likely benefit from CBT or DBT  3/16: Psychiatry recommending transferring to inpatient psychiatric facility once medically cleared.  However patient has to perform self ostomy care which has been challenging.  Management and  working on best safe discharge plan.    Hypothyroidism- (present on admission)  Assessment & Plan  TSH wnl Feb 2021  Continue home Levothyroxine    Acute renal failure superimposed on stage 3 chronic kidney disease (HCC)- (present on admission)  Assessment & Plan  CKD 3  Had improved with increased midodrine and diuresis, but worsened this morning in setting of holding diuresis for hypotension and lactic acidosis  500 cc NS bolus today, repeat BMP this afternoon  Developed AHRF with pulmonary edema from 1 L bolus, judicious IVF  Diuresis held, continue midodrine  3/16: Discontinue Bactrim as it will worsen kidney functions. Slowly improving.  Avoid nephrotoxins.  Renally dose all medications.  3/18: Back to baseline.    Debility- (present on admission)  Assessment & Plan  Likely due to depression  PT/OT evaluated, no further skilled needs    Essential hypertension- (present on admission)  Assessment & Plan  Resolved, has been hypotensive this admission in setting of secondary adrenal insufficiency  Continue midodrine    Lower limb amputation, great toe (HCC)- (present on  admission)  Assessment & Plan  s/p right great toe amputation 2018    Hypomagnesemia  Assessment & Plan  Mg WNL today  Repeat AM BMP/Mg    Hypokalemia  Assessment & Plan  K 3.2  Lasix held yesterday due to hypotension and lactic acidosis  Continue Kdur 20 daily  Discussed with pharmacy, no additional K today due to risk of hyperkalemia with bactrim  Mg WNL  Repeat AM BMP/Mg    Hyponatremia  Assessment & Plan  Moderate, Na 127  In setting of IV lasix, holding lasix due to severe sepsis with lactic acidosis  500 cc NS trial today, repeat afternoon BMP  If worsening will get Delmis and Uosm  Repeat AM BMP    Obstructive uropathy- (present on admission)  Assessment & Plan  Urinary retention on bladder scans by straight cath.  Zabala catheter was placed temporarily.  Remove with voiding trials per nursing protocol.  Started on Flomax.  3/19: Zabala catheter was removed.  Voiding trials successful so far.    Rash- (present on admission)  Assessment & Plan  Patient developed pruritic drug related rash   Prn Benadryl, steroids   3/18: Resolving.    Chronic venous insufficiency of lower extremity- (present on admission)  Assessment & Plan  Compression stockings    Elevated troponin- (present on admission)  Assessment & Plan  RESOLVED  Stable. Suspect due to demand ischemia. No need to trend further.    Nonischemic cardiomyopathy (HCC)- (present on admission)  Assessment & Plan  Normal coronaries, 2018.  Tachycardia mediated per cardiology  LVEF improved, based on echo 2/10/2021 LVEF 60%    Chest pain- (present on admission)  Assessment & Plan  Resolved  EKG and Troponins not consistent with ACS    Mixed hyperlipidemia- (present on admission)  Assessment & Plan  Continue atorvastatin    Obesity- (present on admission)  Assessment & Plan  Body mass index is 37.79 kg/m².  Counseled about diet and exercise       VTE prophylaxis:  Eliquis

## 2021-03-19 NOTE — CONSULTS
"PSYCHOLOGICAL FOLLOW-UP:  Reason for admission: Syncope and collapse [R55]  Suicidal ideation [R45.851]  Sepsis due to Klebsiella pneumoniae (HCC) [A41.4]  Length of Visit: 30min    Legal status: Legal Status: Involuntary    Chief Complaint: \"The voices came back.\"    HPI: Met with the patient for brief individual psychotherapy. Patient presented with a somewhat anxious affect however he was able to laugh and smile appropriately. He reported a \"ok\" mood. He continues to endorse suicidal thoughts with mood congruent auditory hallucinations. This is a common presentation for this patient whenever he experiences stress (whether it be physical and/or psychological.) The severity of this experience is on a spectrum and he is sometimes able to maintain hope and future orientation even when experiencing suicidality. His intent to follow through with the thoughts and plans is usually low and he will inform someone of the thoughts and voices before acting upon them. This was the case last night and this morning. He has not acted upon the thoughts and/or voices while in the hospital. Nor does he have a recent history of acting upon his suicidality. As stated above, he will usually reach out for help first. Today, the session continued to focus on thought diffusion with an emphasis on mindfulness. That is, awareness of thoughts and voices without becoming consumed by them. Reminded the patient that he can have thoughts and experience voices without \"believing\" or \"acting\" upon them. Also reminded the patient of the difficulty that trying to stop a thought and/or voice can be and how this can lead to the thoughts and/or voices becoming louder. Encouraged the patient to continues practicing mindfulness and thought diffusion skills as well as distraction in the hospital. Also encouraged the patient to continue practicing caring for his ostomy on his own. Patient agreed.     Psychiatric Examination:  Vitals: Blood pressure (!) " "162/75, pulse 60, temperature 36.3 °C (97.3 °F), temperature source Temporal, resp. rate 16, height 1.829 m (6'), weight (!) 130 kg (286 lb 9.6 oz), SpO2 95 %.  Musculoskeletal: normal psychomotor activity, no tics or unusual mannerisms noted  Appearance and Eye Contact: somewhat disheveled. appropriate dress and grooming. Behavior is calm, cooperative,  appropriate eye contact  Attention/Alertness: Alert  Thought Process: Linear, Logical and Goal Directed    Thought Content: No psychotic processes noted  Speech: Clear with normal rate and rhythm  Mood: \"ok\"            Affect: somewhat anxious         SI/HI: Endorses    Memory: Recent and remote memory appear intact    Orientation: alert, oriented to person, place and time  Insight into symptoms: fair  Judgement into symptoms:fair    ASSESSMENT: The patient continues to endorse suicidality with auditory hallucinations that can sometimes be command in nature. As stated above, this is a typical presentation that can vary in severity depending on psychological and physical stressors. Intent to follow through with these thoughts and plans tends to be low as he becomes scared when he has these experiences. He will also notify staff whenever the experiences start and has a history of coming to the hospital for help. Nevertheless, he is still unsure if he can keep himself safe on his own at home so the legal hold remains extended. Will continue to work with the patient to build confidence in his ability to maintain awareness of the voices and thoughts without becoming consumed by them. Will also continue to work on building confidence with his ostomy care.     Recommendations:    -Remind patient that he does not need to believe, listen, or act upon any thought and voice he may experience. Remind him of the work he is doing with this writer on that skills.    -Continue to encourage the patient to perform his own ostomy care.       DSM5 Diagnostic Considerations:   Major " Depressive Disorder, recurrent, severe, with psychotic features  Unspecified Anxiety Disorder      PLAN:  Legal status: Involuntary  Anticipate F/U within 72 hours.   Records reviewed: yes  Discussed patient with other provider: yes, team psychiatrist   Will continue to follow  Thank you for the consult.    Madhuri Fernandez, Ph.D.

## 2021-03-19 NOTE — DISCHARGE PLANNING
Agency/Facility Name: [facility], [place], [facility]  Outcome: Updated referral sent

## 2021-03-19 NOTE — THERAPY
Occupational Therapy   Re- Evaluation     Patient Name: Sebastián Castro  Age:  56 y.o., Sex:  male  Medical Record #: [id number]  Today's Date: 3/18/2021     Precautions  Precautions: Fall Risk  Comments: Intermittent orthostatic hypotension.    Assessment  Patient is 56 y.o presents to OT services following decline in function with prolonged hospital stay. Pt was discharged from services in Feb at supervision level and today pt requires mod to max a for LB ADLs, mod a for functional t/f's. Pt self reports limited to no mobility while in house since discharge from therapy services. Pt currently demonstrates impaired balance, impaired activity tolerance, generalized strength and endurance deficits to safely and independently engage in ADLs.     Plan    Recommend Occupational Therapy 3 times per week until therapy goals are met for the following treatments:  Adaptive Equipment, Self Care/Activities of Daily Living, Therapeutic Activities and Therapeutic Exercises.    DC Equipment Recommendations: Unable to determine at this time  Discharge Recommendations: Recommend post-acute placement for additional occupational therapy services prior to discharge home     Objective       03/18/21 1158   Prior Living Situation   Prior Services None   Housing / Facility Group Home   Bathroom Set up Bathtub / Shower Combination   Equipment Owned 4-Wheel Walker   Lives with - Patient's Self Care Capacity Other (Comments)  (Group Home)   Comments Reports prior to hospital admission was in Group home with assistance for meals and housekeeping   Prior Level of ADL Function   Self Feeding Independent   Grooming / Hygiene Independent   Bathing Independent   Dressing Independent   Toileting Independent   Prior Level of IADL Function   Medication Management Independent   Laundry Independent   Kitchen Mobility Independent   Finances Independent   Home Management Requires Assist   Shopping Requires Assist   Prior Level Of Mobility Independent With  Device in Home;Independent Without Device in Home   Comments provides limited information, query for efficacy of IADLs management. Per EMR, group home was assisting with IADLS   Cognition    Cognition / Consciousness X   Level of Consciousness Alert   Comments flat affect, agreeable to therapy and following commands appropriately   Strength Upper Body   Upper Body Strength  X   Comments generalized weakness but functional for ADLs   Bed Mobility    Supine to Sit Minimal Assist   Sit to Supine   (up in chair post session)   Scooting Minimal Assist   Rolling Minimal Assist to Rt.   Comments raised hob   ADL Assessment   Eating Modified Independent   Grooming Supervision;Seated   Bathing   (declined)   Upper Body Dressing Supervision   Lower Body Dressing Moderate Assist   Toileting   (frye in place and has ostomy)   Comments pt needs to be I with ostomy care prior to t/f to psych facility   Functional Mobility   Sit to Stand Moderate Assist   Bed, Chair, Wheelchair Transfer Minimal Assist   Toilet Transfers Refused   Mobility bed mobility, STS eob x2 trials, eob->chair   Comments w/4WW   Short Term Goals   Short Term Goal # 1 Pt will perform LB dressing with supervision   Short Term Goal # 2 Pt will perform functional t/f's with supervision   Short Term Goal # 3 Pt will perform h/g standing sink side with supervision   Anticipated Discharge Equipment and Recommendations   DC Equipment Recommendations Unable to determine at this time

## 2021-03-19 NOTE — DISCHARGE PLANNING
Anticipated Discharge Disposition: inpatient psych     Action: Patient was discussed in IDT rounds. Patient is medically clear per Dr. Martin. Patient is currently on IV abx but those will be switched to oral on discharge. Nursing has been working with patient on doing his own ostomy care.     Barriers to Discharge: ostomy, legal hold    Plan: LSW to resend referrals to inpatient psych.     1150: LSW received a call from Belen PÉREZ with [facility]. Belen wanting to do RN to RN call. LSW transferred her to bedside RN.

## 2021-03-20 LAB
ALBUMIN SERPL BCP-MCNC: 2.9 G/DL (ref 3.2–4.9)
ALBUMIN/GLOB SERPL: 0.9 G/DL
ALP SERPL-CCNC: 135 U/L (ref 30–99)
ALT SERPL-CCNC: 25 U/L (ref 2–50)
ANION GAP SERPL CALC-SCNC: 9 MMOL/L (ref 7–16)
AST SERPL-CCNC: 39 U/L (ref 12–45)
BASOPHILS # BLD AUTO: 0 % (ref 0–1.8)
BASOPHILS # BLD: 0 K/UL (ref 0–0.12)
BILIRUB SERPL-MCNC: 0.2 MG/DL (ref 0.1–1.5)
BUN SERPL-MCNC: 28 MG/DL (ref 8–22)
CALCIUM SERPL-MCNC: 8.9 MG/DL (ref 8.5–10.5)
CHLORIDE SERPL-SCNC: 99 MMOL/L (ref 96–112)
CO2 SERPL-SCNC: 28 MMOL/L (ref 20–33)
CREAT SERPL-MCNC: 0.86 MG/DL (ref 0.5–1.4)
EOSINOPHIL # BLD AUTO: 0.42 K/UL (ref 0–0.51)
EOSINOPHIL NFR BLD: 2.6 % (ref 0–6.9)
ERYTHROCYTE [DISTWIDTH] IN BLOOD BY AUTOMATED COUNT: 45.1 FL (ref 35.9–50)
GLOBULIN SER CALC-MCNC: 3.2 G/DL (ref 1.9–3.5)
GLUCOSE BLD-MCNC: 157 MG/DL (ref 65–99)
GLUCOSE BLD-MCNC: 161 MG/DL (ref 65–99)
GLUCOSE BLD-MCNC: 162 MG/DL (ref 65–99)
GLUCOSE BLD-MCNC: 165 MG/DL (ref 65–99)
GLUCOSE BLD-MCNC: 214 MG/DL (ref 65–99)
GLUCOSE SERPL-MCNC: 177 MG/DL (ref 65–99)
HCT VFR BLD AUTO: 42.8 % (ref 42–52)
HGB BLD-MCNC: 13.8 G/DL (ref 14–18)
LYMPHOCYTES # BLD AUTO: 4.01 K/UL (ref 1–4.8)
LYMPHOCYTES NFR BLD: 24.6 % (ref 22–41)
MANUAL DIFF BLD: NORMAL
MCH RBC QN AUTO: 28.2 PG (ref 27–33)
MCHC RBC AUTO-ENTMCNC: 32.2 G/DL (ref 33.7–35.3)
MCV RBC AUTO: 87.5 FL (ref 81.4–97.8)
MONOCYTES # BLD AUTO: 0.86 K/UL (ref 0–0.85)
MONOCYTES NFR BLD AUTO: 5.3 % (ref 0–13.4)
MORPHOLOGY BLD-IMP: NORMAL
NEUTROPHILS # BLD AUTO: 11 K/UL (ref 1.82–7.42)
NEUTROPHILS NFR BLD: 67.5 % (ref 44–72)
NRBC # BLD AUTO: 0 K/UL
NRBC BLD-RTO: 0 /100 WBC
PLATELET # BLD AUTO: 447 K/UL (ref 164–446)
PLATELET BLD QL SMEAR: NORMAL
PMV BLD AUTO: 10.6 FL (ref 9–12.9)
POTASSIUM SERPL-SCNC: 3.5 MMOL/L (ref 3.6–5.5)
PROT SERPL-MCNC: 6.1 G/DL (ref 6–8.2)
RBC # BLD AUTO: 4.89 M/UL (ref 4.7–6.1)
RBC BLD AUTO: NORMAL
SODIUM SERPL-SCNC: 136 MMOL/L (ref 135–145)
WBC # BLD AUTO: 16.3 K/UL (ref 4.8–10.8)

## 2021-03-20 PROCEDURE — 700102 HCHG RX REV CODE 250 W/ 637 OVERRIDE(OP): Performed by: STUDENT IN AN ORGANIZED HEALTH CARE EDUCATION/TRAINING PROGRAM

## 2021-03-20 PROCEDURE — 700102 HCHG RX REV CODE 250 W/ 637 OVERRIDE(OP): Performed by: FAMILY MEDICINE

## 2021-03-20 PROCEDURE — A9270 NON-COVERED ITEM OR SERVICE: HCPCS | Performed by: STUDENT IN AN ORGANIZED HEALTH CARE EDUCATION/TRAINING PROGRAM

## 2021-03-20 PROCEDURE — 700111 HCHG RX REV CODE 636 W/ 250 OVERRIDE (IP): Performed by: FAMILY MEDICINE

## 2021-03-20 PROCEDURE — A9270 NON-COVERED ITEM OR SERVICE: HCPCS | Performed by: FAMILY MEDICINE

## 2021-03-20 PROCEDURE — A9270 NON-COVERED ITEM OR SERVICE: HCPCS | Performed by: HOSPITALIST

## 2021-03-20 PROCEDURE — 700102 HCHG RX REV CODE 250 W/ 637 OVERRIDE(OP): Performed by: HOSPITALIST

## 2021-03-20 PROCEDURE — 36415 COLL VENOUS BLD VENIPUNCTURE: CPT

## 2021-03-20 PROCEDURE — 80053 COMPREHEN METABOLIC PANEL: CPT

## 2021-03-20 PROCEDURE — 85007 BL SMEAR W/DIFF WBC COUNT: CPT

## 2021-03-20 PROCEDURE — 82962 GLUCOSE BLOOD TEST: CPT | Mod: 91

## 2021-03-20 PROCEDURE — 770001 HCHG ROOM/CARE - MED/SURG/GYN PRIV*

## 2021-03-20 PROCEDURE — 99231 SBSQ HOSP IP/OBS SF/LOW 25: CPT | Performed by: FAMILY MEDICINE

## 2021-03-20 PROCEDURE — 85027 COMPLETE CBC AUTOMATED: CPT

## 2021-03-20 RX ORDER — POTASSIUM CHLORIDE 20 MEQ/1
40 TABLET, EXTENDED RELEASE ORAL ONCE
Status: COMPLETED | OUTPATIENT
Start: 2021-03-20 | End: 2021-03-20

## 2021-03-20 RX ADMIN — SIMETHICONE 80 MG: 80 TABLET, CHEWABLE ORAL at 11:22

## 2021-03-20 RX ADMIN — PREDNISONE 20 MG: 20 TABLET ORAL at 04:18

## 2021-03-20 RX ADMIN — CLONAZEPAM 1 MG: 1 TABLET ORAL at 18:17

## 2021-03-20 RX ADMIN — METOPROLOL SUCCINATE 25 MG: 25 TABLET, EXTENDED RELEASE ORAL at 04:18

## 2021-03-20 RX ADMIN — ATORVASTATIN CALCIUM 40 MG: 40 TABLET, FILM COATED ORAL at 18:17

## 2021-03-20 RX ADMIN — DOCUSATE SODIUM 50 MG AND SENNOSIDES 8.6 MG 2 TABLET: 8.6; 5 TABLET, FILM COATED ORAL at 08:49

## 2021-03-20 RX ADMIN — POTASSIUM CHLORIDE 40 MEQ: 1500 TABLET, EXTENDED RELEASE ORAL at 09:39

## 2021-03-20 RX ADMIN — TAMSULOSIN HYDROCHLORIDE 0.4 MG: 0.4 CAPSULE ORAL at 04:17

## 2021-03-20 RX ADMIN — CLONAZEPAM 0.5 MG: 0.5 TABLET ORAL at 08:49

## 2021-03-20 RX ADMIN — APIXABAN 5 MG: 5 TABLET, FILM COATED ORAL at 08:48

## 2021-03-20 RX ADMIN — APIXABAN 5 MG: 5 TABLET, FILM COATED ORAL at 20:16

## 2021-03-20 RX ADMIN — PAROXETINE HYDROCHLORIDE 20 MG: 20 TABLET, FILM COATED ORAL at 08:49

## 2021-03-20 RX ADMIN — SIMETHICONE 80 MG: 80 TABLET, CHEWABLE ORAL at 04:17

## 2021-03-20 RX ADMIN — SULFAMETHOXAZOLE AND TRIMETHOPRIM 1 TABLET: 800; 160 TABLET ORAL at 18:16

## 2021-03-20 RX ADMIN — SIMETHICONE 80 MG: 80 TABLET, CHEWABLE ORAL at 18:17

## 2021-03-20 RX ADMIN — SULFAMETHOXAZOLE AND TRIMETHOPRIM 1 TABLET: 800; 160 TABLET ORAL at 04:18

## 2021-03-20 RX ADMIN — INSULIN HUMAN 1 UNITS: 100 INJECTION, SOLUTION PARENTERAL at 08:49

## 2021-03-20 RX ADMIN — MIDODRINE HYDROCHLORIDE 5 MG: 5 TABLET ORAL at 05:34

## 2021-03-20 RX ADMIN — POTASSIUM CHLORIDE 20 MEQ: 20 TABLET, EXTENDED RELEASE ORAL at 04:18

## 2021-03-20 RX ADMIN — MIDODRINE HYDROCHLORIDE 5 MG: 5 TABLET ORAL at 11:22

## 2021-03-20 RX ADMIN — ZIPRASIDONE HYDROCHLORIDE 60 MG: 60 CAPSULE ORAL at 08:49

## 2021-03-20 RX ADMIN — DIGOXIN 125 MCG: 125 TABLET ORAL at 18:17

## 2021-03-20 RX ADMIN — FLUDROCORTISONE ACETATE 0.1 MG: 0.1 TABLET ORAL at 05:35

## 2021-03-20 RX ADMIN — INSULIN GLARGINE 5 UNITS: 100 INJECTION, SOLUTION SUBCUTANEOUS at 04:21

## 2021-03-20 RX ADMIN — ZIPRASIDONE HYDROCHLORIDE 60 MG: 60 CAPSULE ORAL at 20:16

## 2021-03-20 RX ADMIN — INSULIN HUMAN 1 UNITS: 100 INJECTION, SOLUTION PARENTERAL at 11:22

## 2021-03-20 RX ADMIN — INSULIN HUMAN 2 UNITS: 100 INJECTION, SOLUTION PARENTERAL at 20:17

## 2021-03-20 RX ADMIN — MIDODRINE HYDROCHLORIDE 5 MG: 5 TABLET ORAL at 18:17

## 2021-03-20 RX ADMIN — DOCUSATE SODIUM 50 MG AND SENNOSIDES 8.6 MG 2 TABLET: 8.6; 5 TABLET, FILM COATED ORAL at 20:16

## 2021-03-20 RX ADMIN — INSULIN HUMAN 1 UNITS: 100 INJECTION, SOLUTION PARENTERAL at 18:16

## 2021-03-20 RX ADMIN — LEVOTHYROXINE SODIUM 112 MCG: 0.11 TABLET ORAL at 04:18

## 2021-03-20 RX ADMIN — OMEPRAZOLE 20 MG: 20 CAPSULE, DELAYED RELEASE ORAL at 04:18

## 2021-03-20 ASSESSMENT — COGNITIVE AND FUNCTIONAL STATUS - GENERAL
STANDING UP FROM CHAIR USING ARMS: A LITTLE
CLIMB 3 TO 5 STEPS WITH RAILING: A LITTLE
SUGGESTED CMS G CODE MODIFIER DAILY ACTIVITY: CJ
WALKING IN HOSPITAL ROOM: A LOT
DRESSING REGULAR LOWER BODY CLOTHING: A LITTLE
DRESSING REGULAR UPPER BODY CLOTHING: A LITTLE
SUGGESTED CMS G CODE MODIFIER MOBILITY: CJ
MOBILITY SCORE: 20
DAILY ACTIVITIY SCORE: 21
EATING MEALS: A LITTLE

## 2021-03-20 ASSESSMENT — ENCOUNTER SYMPTOMS
DIZZINESS: 0
HEADACHES: 0
CHILLS: 0
NERVOUS/ANXIOUS: 0
MYALGIAS: 0
BRUISES/BLEEDS EASILY: 0
NAUSEA: 0
SHORTNESS OF BREATH: 0
DEPRESSION: 1
COUGH: 0
BLURRED VISION: 0
HEARTBURN: 0
FEVER: 0

## 2021-03-20 ASSESSMENT — PATIENT HEALTH QUESTIONNAIRE - PHQ9
2. FEELING DOWN, DEPRESSED, IRRITABLE, OR HOPELESS: NEARLY EVERY DAY
1. LITTLE INTEREST OR PLEASURE IN DOING THINGS: NEARLY EVERY DAY
SUM OF ALL RESPONSES TO PHQ9 QUESTIONS 1 AND 2: 6

## 2021-03-20 ASSESSMENT — PAIN DESCRIPTION - PAIN TYPE: TYPE: ACUTE PAIN

## 2021-03-20 NOTE — PROGRESS NOTES
Assumed care of pt. Bedside report received from Sarah PÉREZ. Pt was updated on plan of care. Call light, phone and personal belongings in reach. Bed alarm on and working properly, bed in lowest position, and locked. Pt has a sitter at bedside, pt is resting comfortably in bed with no complaints of pain.

## 2021-03-20 NOTE — PROGRESS NOTES
[facility] Medicine Daily Progress Note    Date of Service  3/20/2021    Chief Complaint  56 y.o. male admitted 2/9/2021 with   Chief Complaint   Patient presents with   • Syncope     X1 this morning when standing up to go to bathroom         Hospital Course  Mr. Sebastián Castro is a 56 y.o. male history of atrial fibrillation secondary to hypercoagulable state, diabetes complicated by diabetic neuropathy, congestive heart failure, hyperlipidemia, orthostatic hypotension on fludrocortisone who presented on 2/9/2021 with syncopal episode.  Patient reported dizziness prior to syncope.  On presentation to the emergency room she was hypertensive and bradycardic.  Cardiology recommended stopping all AV sanket blocking agents.  He was also started on midodrine for hypotension.    The patient was started on digoxin and apixaban for atrial fibrillation.  Echocardiogram showed EF of 60%, improved from previous echocardiogram obtained on 11/2020.    Of note patient has history of a spleen injury complicated by perforation of splenic flexure in September 2020.  Status post colostomy and Interiano's pouch in November 2020.  He was at home taking care of his colostomy prior to most recent admission.  However he has become depressed, overwhelmed, and can no longer take care of his colostomy independently.    Psych continues to follow the patient; psychiatric medication as per psych recommendation.      Interval Problem Update  Febrile to 38.1 C overnight.   Developed hypersensitivity rash and itching due to zosyn.   Switched to ciprofloxacin and treated with benadryl.  BCx resulted as pan-susceptible Klebsiella pneumo.   Discussed with pharmacist Ashley and decided on bactrim.    He reports that the itching and rash are improving.  Gas is better today than it was yesterday.  Leg swelling feels better.  He denies dyspnea, pain, N/V, chills, dysuria, bowel dysfunction.      Emotionally he is having a hard time from feeling sick.  He  reports that staff has been consistently kind to him since yesterday.  Seen by psychiatrist Dr. Easley yesterday, no changes in medication.  3/16: Resting in bed comfortable.  Pruritic rash noted on upper extremities, chest, and neck.  Afebrile.  Hemodynamically stable.  Saturating well on room air.  Still admits to suicidal ideation.  Sitter at bedside.  No acute distress noted.  No issues overnight per staff.  3/17: Resting in bed comfortably. No acute distress noted.  Still have pleuritic rash all over.  Hemodynamically stable.  Saturating well on room air.  Sitter at bedside.  Still admits to suicidal ideation.  No acute distress noted.  No issues overnight per staff.  3/18: Resting in bed comfortably.  Erythema and rash much improved.  Hemodynamically stable.  Febrile.  Kidney functions improving.  Still admits to suicidal ideation.  Received training for ostomy self-care today.  No acute distress noted.  No issues overnight per staff.  3/19: Resting in bed comfortably.  Rash much improved.  Pruritus much improved as well.  Zabala catheter was removed with successful voiding trials.  Kidney function is much improved.  Hemodynamically stable.  Afebrile.  Still suicidal.  Received training for colostomy self-care yesterday and will continue today.  No acute distress noted.  No issues overnight per staff.  3/20: Resting in bed comfortable.  Rash and pruritus much improved.  Hemodynamically stable.  Afebrile.  Still requiring more training on self ostomy care.  Still admits to suicidal ideation.  Sitter at bedside.  No issues overnight per staff.  Consultants/Specialty  Psychiatry - SI  Cardiology - Hypotension and Atrial Fibrillation    Code Status  Full Code    Disposition  To inpatient psychiatry pending improvement in sepsis, hypotension, and hypoxic respiratory failure.  Must be independently managing ostomy prior to transfer to psych facility.  Can be transferred to medical floor.  Review of  Systems  Review of Systems   Constitutional: Negative for chills and fever.   HENT: Negative for ear pain.    Eyes: Negative for blurred vision.   Respiratory: Negative for cough and shortness of breath.    Cardiovascular: Negative for chest pain.   Gastrointestinal: Negative for heartburn and nausea.   Genitourinary: Negative for dysuria and urgency.   Musculoskeletal: Negative for myalgias.   Skin: Negative for itching and rash.   Neurological: Negative for dizziness and headaches.   Endo/Heme/Allergies: Does not bruise/bleed easily.   Psychiatric/Behavioral: Positive for depression and suicidal ideas. The patient is not nervous/anxious.         Physical Exam  Temp:  [35.9 °C (96.7 °F)-36.2 °C (97.2 °F)] 35.9 °C (96.7 °F)  Pulse:  [52-59] 52  Resp:  [16-18] 18  BP: (134-147)/(73-83) 146/73  SpO2:  [91 %-95 %] 94 %    Physical Exam  Vitals and nursing note reviewed.   Constitutional:       General: He is not in acute distress.     Appearance: He is obese. He is not ill-appearing (Appears fatigued).      Interventions: Nasal cannula in place.   HENT:      Head: Normocephalic and atraumatic.      Nose: Nose normal.      Mouth/Throat:      Mouth: Mucous membranes are moist.   Eyes:      General: No scleral icterus.     Extraocular Movements: Extraocular movements intact.      Pupils: Pupils are equal, round, and reactive to light.   Cardiovascular:      Rate and Rhythm: Normal rate and regular rhythm.      Pulses: Normal pulses.      Heart sounds: Normal heart sounds. No friction rub. No gallop.    Pulmonary:      Effort: Pulmonary effort is normal.      Breath sounds: Normal breath sounds. No wheezing or rhonchi.   Abdominal:      General: Abdomen is flat. Bowel sounds are normal.      Palpations: Abdomen is soft.      Tenderness: There is no abdominal tenderness. There is no rebound.   Genitourinary:     Comments: Ostomy site dressed, nonerythematous, nontender.  Ostomy output watery / brown stool, some gas in bag  but not tense.  Musculoskeletal:         General: Normal range of motion.      Cervical back: Neck supple.      Comments: 1+ edema to knees   Skin:     General: Skin is warm.      Findings: Erythema (Generalized.  Resolving) and rash (Resolving) present.      Comments: BLE chronic stasis dermatitis   Neurological:      General: No focal deficit present.      Mental Status: He is alert and oriented to person, place, and time.      Cranial Nerves: No cranial nerve deficit.      Comments: Appropriately conversant   Psychiatric:         Mood and Affect: Mood is depressed.         Speech: Speech normal.         Behavior: Behavior is cooperative.         Thought Content: Thought content includes suicidal ideation.         Cognition and Memory: Cognition and memory normal.         Fluids    Intake/Output Summary (Last 24 hours) at 3/20/2021 1421  Last data filed at 3/20/2021 0943  Gross per 24 hour   Intake 360 ml   Output 670 ml   Net -310 ml       Laboratory  Recent Labs     03/18/21 0237 03/19/21  0335 03/20/21  0315   WBC 12.9* 16.7* 16.3*   RBC 4.90 4.73 4.89   HEMOGLOBIN 13.8* 13.5* 13.8*   HEMATOCRIT 42.5 41.0* 42.8   MCV 86.7 86.7 87.5   MCH 28.2 28.5 28.2   MCHC 32.5* 32.9* 32.2*   RDW 45.0 45.1 45.1   PLATELETCT 383 434 447*   MPV 10.2 10.8 10.6     Recent Labs     03/18/21 0237 03/19/21  0335 03/20/21  0315   SODIUM 135 135 136   POTASSIUM 3.9 3.7 3.5*   CHLORIDE 100 100 99   CO2 25 25 28   GLUCOSE 174* 208* 177*   BUN 37* 32* 28*   CREATININE 1.22 0.92 0.86   CALCIUM 9.2 9.1 8.9                   Imaging  [id number] VIEW   Final Result         No significant interval change.      DX-CHEST-LIMITED (1 VIEW)   Final Result         Diffuse interstitial prominence could relate to mild pulmonary edema or atypical infection      [id number]-8 VIEW   Final Result      Increased colonic gas without definite bowel obstruction.      US-RENAL   Final Result      No evidence of hydronephrosis.      Lobulated kidneys  bilaterally.      CT-ABDOMEN-PELVIS WITH   Final Result      1.  There is no evidence of small bowel obstruction.   2.  There is a left lower quadrant ostomy.   3.  There is fluid distention of the colon distal to the surgical material in the left mid descending colon extending all the way to the rectum. There is no pneumatosis or free air.   4.  There is cholelithiasis without biliary dilatation.   5.  There has been interval removal of the drainage catheter anterior to the spleen with minimal hypodense area in the anterior spleen again noted. There is no new fluid collection.      IR-US GUIDED PIV   Final Result    Ultrasound-guided PERIPHERAL IV INSERTION performed by    qualified nursing staff as above.      EC-ECHOCARDIOGRAM COMPLETE W/O CONT   Final Result      DX-LUMBAR SPINE-2 OR 3 VIEWS   Final Result      Moderate compression deformity of T11 is new compared to 2018.      Mild wedge deformity of T12 is unchanged.      Degenerative changes including facet arthropathy.      Mild retrolisthesis of L5 on S1 and L3 on L4.              Assessment/Plan  * Suicidal ideation- (present on admission)  Assessment & Plan  Has history of MDD, recurrent with psychotic features with active SI w/ plan.  Psychiatry consulted, continued legal hold  Legal hold until 3/18  To be discharged to psychiatric program pending medical clearance and when able to self-care for ostomy    Cardiogenic pulmonary edema (HCC)  Assessment & Plan  CXR demonstrates pulmonary vascular congestion / edema  NT-BNP 7828  Due to IV boluses for hypotension and N/V in setting of HFrEF with recovered EF  IV lasix tolerated with improvement in BP and Cr, discontinued 3/14 for severe sepsis and lactic acidosis  3/16: Currently saturating well on room air.    Major depressive disorder, recurrent, severe with psychotic features (HCC)- (present on admission)  Assessment & Plan  Psychiatry and Psychology following  Continue paroxetine, ziprasidone, and  clonazepam    Secondary adrenal insufficiency (HCC)- (present on admission)  Assessment & Plan  Cosyntropin stimulation test reviewed with only a increase in his cortisol going from 14.5 then up to 21.3 after cosyntropin given  Renin 0.1, ACTH 3.4 and slight increase with cosyntropin suggesting secondary AI  Random cortisol 9.9  Needs outpatient endocrinology follow up.  Repeat adrenal function after discharge  Repeat AM BMP    Florinef restarted 3/14 at 0.2 mg daily (2x dose) due to severe sepsis from GNR bacteremia in order to prevent adrenal crisis    Sepsis with acute renal failure without septic shock (HCC)  Assessment & Plan  This is Severe Sepsis Not present on admission  SIRS criteria identified on my evaluation include: Tachycardia, with heart rate greater than 90 BPM and Leukocytosis, with WBC greater than 12,000  Source of infection is GNR bacteremia  Clinical indicators of end organ dysfunction include Systolic blood pressure (SBP) <90 mmHg or mean arterial pressure <65 mmHg and Lactate >2 mmol/L (18.0 mg/dL)  Sepsis protocol initiated  Fluid resuscitation not ordered due to pulmonary edema seen on CXR due to HFrEF  IV antibiotics as appropriate for source of sepsis - Zosyn    BCx x2 (3/13) +GNR in one bottle, awaiting speciation and susceptbilities  UCx added to UA (3/13) that showed pyuria and bacteruria  Cdiff negative (3/12)  Prior microbiology date reviewed, h/o enterococcus and MRSA but currently not positive for GPCs    Reassessment: I have reassessed the patient's hemodynamic status  End organ dysfunction include(s):  Acute kidney failure (SRUTHI)  Cr and BP improved after diuresis yesterday for cardiogenic pulmonary edema  Lactic Acid 2.8, improved with holding diuresis      Orthostatic hypotension- (present on admission)  Assessment & Plan  Multifactorial including adrenal insufficiency, medications (beta blocker, CCB, tamsulosin), diabetic autonomic dysfunction, and venous insufficiency  EF is  noted to be 60%, significant improvement from prior.  Per cardiology, no further cardiac work-up needed  Follow-up with cardiology as an outpatient.  Continue midodrine, PRN IVF    Bacteremia  Assessment & Plan  Due to klebsiella bacteremia  BCx: 1 of 2 bottles +Klebsiella  Cdiff negative  UA: Bacteruria and pyuria  UCx: In progress  CXR with pulmonary edema but no infiltrates  3/16: Currently on Ancef.  3/19: Switch him to Bactrim to complete a 10-day course.        Abdominal bloating  Assessment & Plan  Improved  Had increasing gas output from ostomy  Intermittent episodes of abdominal pain and N/V  CT 3/4 did not demonstrate obstruction nor pneumotosis  KUB 3/12 for recurrent symptoms demonstrated increased gas but no signs of SBO  Simethicone scheduled 80 mg TID  If uncontrolled, would confer with general surgeon about further imaging and discuss empiric treatment for SIBO    Normal anion gap metabolic acidosis  Assessment & Plan  Resolved  Due to bicarbonate loss via ostomy  Repeat AM BMP    Syncope due to orthostatic hypotension- (present on admission)  Assessment & Plan  Likely secondary to orthostatic hypotension  Echo normal with 60% ejection fraction, improved from prior    Paroxysmal atrial fibrillation (HCC)- (present on admission)  Assessment & Plan  CHADSVASC 3 (HTN, CHF, DM).   TTE Feb 2021: EF 60%  Continue metoprolol, digoxin, and eliquis  Discontinued telemetry    Chronic heart failure with preserved ejection fraction (HCC)- (present on admission)  Assessment & Plan  Decompensated based on peripheral and pulmonary edema, mild hyponatremia  IV diuresis initiated 3/13 for pulmonary edema, held 3/14 in setting of lactic acidosis from severe sepsis  Recovered EF 60% on repeat echo.  Continue metoprolol XL  Lisinopril held per Cardiology recommendations due to hypotension      Diabetes mellitus type 2, insulin dependent (HCC)- (present on admission)  Assessment & Plan  A1c 5.2  Diabetic diet  Restarting  accuchecks and SSI due to GNR sepsis  BG goal <200 with bacteremia  Continue atorvastatin    Colostomy present (HCC)- (present on admission)  Assessment & Plan  History of fall at home Nov 2020 on left side with perforated colon and splenic fluid collection/hematoma s/p segmental colectomy, colostomy, mobilization of the splenic flexure, wound vac, and I&D Nov 2020.   Ostomy care consulted, but he was non-participatory  He is engaging in ostomy care with his bedside RNs, encouraged to be observed but do it independently  Patient must be independently able to care for his ostomy before inpatient psychiatry will accept him  Reversal timing and workup after 6 months per [facility]  3/16: Patient has to perform self ostomy care in order to be transferred to inpatient psychiatric facility.  3/18: Patient to receive training for self ostomy care today.    Anxiety and depression- (present on admission)  Assessment & Plan  On Legal Hold until 3/18  Psychiatry consulted  Continue paroxetine, ziprasidone, and clonazepam  Would likely benefit from CBT or DBT  3/16: Psychiatry recommending transferring to inpatient psychiatric facility once medically cleared.  However patient has to perform self ostomy care which has been challenging.  Management and  working on best safe discharge plan.    Hypothyroidism- (present on admission)  Assessment & Plan  TSH wnl Feb 2021  Continue home Levothyroxine    Acute renal failure superimposed on stage 3 chronic kidney disease (HCC)- (present on admission)  Assessment & Plan  CKD 3  Had improved with increased midodrine and diuresis, but worsened this morning in setting of holding diuresis for hypotension and lactic acidosis  500 cc NS bolus today, repeat BMP this afternoon  Developed AHRF with pulmonary edema from 1 L bolus, judicious IVF  Diuresis held, continue midodrine  3/16: Discontinue Bactrim as it will worsen kidney functions. Slowly improving.  Avoid  nephrotoxins.  Renally dose all medications.  3/18: Back to baseline.    Debility- (present on admission)  Assessment & Plan  Likely due to depression  PT/OT evaluated, no further skilled needs    Essential hypertension- (present on admission)  Assessment & Plan  Resolved, has been hypotensive this admission in setting of secondary adrenal insufficiency  Continue midodrine    Lower limb amputation, great toe (HCC)- (present on admission)  Assessment & Plan  s/p right great toe amputation 2018    Hypomagnesemia  Assessment & Plan  Mg WNL today  Repeat AM BMP/Mg    Hypokalemia  Assessment & Plan  K 3.2  Lasix held yesterday due to hypotension and lactic acidosis  Continue Kdur 20 daily  Discussed with pharmacy, no additional K today due to risk of hyperkalemia with bactrim  Mg WNL  Repeat AM BMP/Mg    Hyponatremia  Assessment & Plan  Moderate, Na 127  In setting of IV lasix, holding lasix due to severe sepsis with lactic acidosis  500 cc NS trial today, repeat afternoon BMP  If worsening will get Delmis and Uosm  Repeat AM BMP    Obstructive uropathy- (present on admission)  Assessment & Plan  Urinary retention on bladder scans by straight cath.  Zabala catheter was placed temporarily.  Remove with voiding trials per nursing protocol.  Started on Flomax.  3/19: Zabala catheter was removed.  Voiding trials successful so far.    Rash- (present on admission)  Assessment & Plan  Patient developed pruritic drug related rash   Prn Benadryl, steroids   3/18: Resolving.    Chronic venous insufficiency of lower extremity- (present on admission)  Assessment & Plan  Compression stockings    Elevated troponin- (present on admission)  Assessment & Plan  RESOLVED  Stable. Suspect due to demand ischemia. No need to trend further.    Nonischemic cardiomyopathy (HCC)- (present on admission)  Assessment & Plan  Normal coronaries, 2018.  Tachycardia mediated per cardiology  LVEF improved, based on echo 2/10/2021 LVEF 60%    Chest pain-  (present on admission)  Assessment & Plan  Resolved  EKG and Troponins not consistent with ACS    Mixed hyperlipidemia- (present on admission)  Assessment & Plan  Continue atorvastatin    Obesity- (present on admission)  Assessment & Plan  Body mass index is 37.79 kg/m².  Counseled about diet and exercise       VTE prophylaxis:  Eliquis

## 2021-03-20 NOTE — PROGRESS NOTES
Per order pt most recent bladder scan was 370ml. Will straight cath. Max tries for straight cath is 2, will replace frye if needed to do more.

## 2021-03-20 NOTE — CARE PLAN
Problem: Safety  Goal: Will remain free from falls  Outcome: PROGRESSING AS EXPECTED     Problem: Infection  Goal: Will remain free from infection  Outcome: PROGRESSING AS EXPECTED     Problem: Communication  Goal: The ability to communicate needs accurately and effectively will improve  Outcome: PROGRESSING SLOWER THAN EXPECTED

## 2021-03-21 LAB
ALBUMIN SERPL BCP-MCNC: 3 G/DL (ref 3.2–4.9)
ALBUMIN/GLOB SERPL: 0.9 G/DL
ALP SERPL-CCNC: 115 U/L (ref 30–99)
ALT SERPL-CCNC: 36 U/L (ref 2–50)
ANION GAP SERPL CALC-SCNC: 8 MMOL/L (ref 7–16)
ANISOCYTOSIS BLD QL SMEAR: ABNORMAL
AST SERPL-CCNC: 39 U/L (ref 12–45)
BACTERIA BLD CULT: NORMAL
BACTERIA BLD CULT: NORMAL
BASOPHILS # BLD AUTO: 1.8 % (ref 0–1.8)
BASOPHILS # BLD: 0.26 K/UL (ref 0–0.12)
BILIRUB SERPL-MCNC: 0.3 MG/DL (ref 0.1–1.5)
BUN SERPL-MCNC: 26 MG/DL (ref 8–22)
CALCIUM SERPL-MCNC: 9.3 MG/DL (ref 8.5–10.5)
CHLORIDE SERPL-SCNC: 102 MMOL/L (ref 96–112)
CO2 SERPL-SCNC: 30 MMOL/L (ref 20–33)
CREAT SERPL-MCNC: 0.84 MG/DL (ref 0.5–1.4)
EOSINOPHIL # BLD AUTO: 0.13 K/UL (ref 0–0.51)
EOSINOPHIL NFR BLD: 0.9 % (ref 0–6.9)
ERYTHROCYTE [DISTWIDTH] IN BLOOD BY AUTOMATED COUNT: 46.6 FL (ref 35.9–50)
GLOBULIN SER CALC-MCNC: 3.3 G/DL (ref 1.9–3.5)
GLUCOSE BLD-MCNC: 117 MG/DL (ref 65–99)
GLUCOSE BLD-MCNC: 122 MG/DL (ref 65–99)
GLUCOSE BLD-MCNC: 131 MG/DL (ref 65–99)
GLUCOSE BLD-MCNC: 139 MG/DL (ref 65–99)
GLUCOSE BLD-MCNC: 147 MG/DL (ref 65–99)
GLUCOSE BLD-MCNC: 167 MG/DL (ref 65–99)
GLUCOSE BLD-MCNC: 205 MG/DL (ref 65–99)
GLUCOSE SERPL-MCNC: 145 MG/DL (ref 65–99)
HCT VFR BLD AUTO: 44.2 % (ref 42–52)
HGB BLD-MCNC: 14.3 G/DL (ref 14–18)
LYMPHOCYTES # BLD AUTO: 4.9 K/UL (ref 1–4.8)
LYMPHOCYTES NFR BLD: 34.5 % (ref 22–41)
MANUAL DIFF BLD: NORMAL
MCH RBC QN AUTO: 28.8 PG (ref 27–33)
MCHC RBC AUTO-ENTMCNC: 32.4 G/DL (ref 33.7–35.3)
MCV RBC AUTO: 88.9 FL (ref 81.4–97.8)
MONOCYTES # BLD AUTO: 0.5 K/UL (ref 0–0.85)
MONOCYTES NFR BLD AUTO: 3.5 % (ref 0–13.4)
MORPHOLOGY BLD-IMP: NORMAL
NEUTROPHILS # BLD AUTO: 8.42 K/UL (ref 1.82–7.42)
NEUTROPHILS NFR BLD: 59.3 % (ref 44–72)
NRBC # BLD AUTO: 0 K/UL
NRBC BLD-RTO: 0 /100 WBC
PLATELET # BLD AUTO: 468 K/UL (ref 164–446)
PLATELET BLD QL SMEAR: NORMAL
PMV BLD AUTO: 9.5 FL (ref 9–12.9)
POTASSIUM SERPL-SCNC: 3.6 MMOL/L (ref 3.6–5.5)
PROT SERPL-MCNC: 6.3 G/DL (ref 6–8.2)
RBC # BLD AUTO: 4.97 M/UL (ref 4.7–6.1)
RBC BLD AUTO: PRESENT
SIGNIFICANT IND 70042: NORMAL
SIGNIFICANT IND 70042: NORMAL
SITE SITE: NORMAL
SITE SITE: NORMAL
SODIUM SERPL-SCNC: 140 MMOL/L (ref 135–145)
SOURCE SOURCE: NORMAL
SOURCE SOURCE: NORMAL
WBC # BLD AUTO: 14.2 K/UL (ref 4.8–10.8)

## 2021-03-21 PROCEDURE — 700102 HCHG RX REV CODE 250 W/ 637 OVERRIDE(OP): Performed by: STUDENT IN AN ORGANIZED HEALTH CARE EDUCATION/TRAINING PROGRAM

## 2021-03-21 PROCEDURE — 770001 HCHG ROOM/CARE - MED/SURG/GYN PRIV*

## 2021-03-21 PROCEDURE — A9270 NON-COVERED ITEM OR SERVICE: HCPCS | Performed by: STUDENT IN AN ORGANIZED HEALTH CARE EDUCATION/TRAINING PROGRAM

## 2021-03-21 PROCEDURE — A9270 NON-COVERED ITEM OR SERVICE: HCPCS | Performed by: HOSPITALIST

## 2021-03-21 PROCEDURE — 36415 COLL VENOUS BLD VENIPUNCTURE: CPT

## 2021-03-21 PROCEDURE — 85027 COMPLETE CBC AUTOMATED: CPT

## 2021-03-21 PROCEDURE — 302098 PASTE RING (FLAT): Performed by: STUDENT IN AN ORGANIZED HEALTH CARE EDUCATION/TRAINING PROGRAM

## 2021-03-21 PROCEDURE — 80053 COMPREHEN METABOLIC PANEL: CPT

## 2021-03-21 PROCEDURE — A9270 NON-COVERED ITEM OR SERVICE: HCPCS | Performed by: FAMILY MEDICINE

## 2021-03-21 PROCEDURE — 90832 PSYTX W PT 30 MINUTES: CPT | Performed by: PSYCHOLOGIST

## 2021-03-21 PROCEDURE — 700102 HCHG RX REV CODE 250 W/ 637 OVERRIDE(OP): Performed by: FAMILY MEDICINE

## 2021-03-21 PROCEDURE — 700102 HCHG RX REV CODE 250 W/ 637 OVERRIDE(OP): Performed by: HOSPITALIST

## 2021-03-21 PROCEDURE — 82962 GLUCOSE BLOOD TEST: CPT | Mod: 91

## 2021-03-21 PROCEDURE — 302111 WAFER OST 2.25IN N IMG RD 2 PC (BARRIER): Performed by: STUDENT IN AN ORGANIZED HEALTH CARE EDUCATION/TRAINING PROGRAM

## 2021-03-21 PROCEDURE — 85007 BL SMEAR W/DIFF WBC COUNT: CPT

## 2021-03-21 PROCEDURE — 99232 SBSQ HOSP IP/OBS MODERATE 35: CPT | Performed by: STUDENT IN AN ORGANIZED HEALTH CARE EDUCATION/TRAINING PROGRAM

## 2021-03-21 RX ADMIN — DIGOXIN 125 MCG: 125 TABLET ORAL at 18:24

## 2021-03-21 RX ADMIN — SULFAMETHOXAZOLE AND TRIMETHOPRIM 1 TABLET: 800; 160 TABLET ORAL at 05:28

## 2021-03-21 RX ADMIN — OMEPRAZOLE 20 MG: 20 CAPSULE, DELAYED RELEASE ORAL at 05:28

## 2021-03-21 RX ADMIN — CLONAZEPAM 1 MG: 1 TABLET ORAL at 17:42

## 2021-03-21 RX ADMIN — DOCUSATE SODIUM 50 MG AND SENNOSIDES 8.6 MG 2 TABLET: 8.6; 5 TABLET, FILM COATED ORAL at 22:28

## 2021-03-21 RX ADMIN — INSULIN GLARGINE 5 UNITS: 100 INJECTION, SOLUTION SUBCUTANEOUS at 05:29

## 2021-03-21 RX ADMIN — POTASSIUM CHLORIDE 20 MEQ: 20 TABLET, EXTENDED RELEASE ORAL at 05:28

## 2021-03-21 RX ADMIN — MIDODRINE HYDROCHLORIDE 5 MG: 5 TABLET ORAL at 12:19

## 2021-03-21 RX ADMIN — SIMETHICONE 80 MG: 80 TABLET, CHEWABLE ORAL at 06:00

## 2021-03-21 RX ADMIN — MIDODRINE HYDROCHLORIDE 5 MG: 5 TABLET ORAL at 09:10

## 2021-03-21 RX ADMIN — APIXABAN 5 MG: 5 TABLET, FILM COATED ORAL at 20:00

## 2021-03-21 RX ADMIN — ATORVASTATIN CALCIUM 40 MG: 40 TABLET, FILM COATED ORAL at 17:42

## 2021-03-21 RX ADMIN — CLONAZEPAM 0.5 MG: 0.5 TABLET ORAL at 09:10

## 2021-03-21 RX ADMIN — ZIPRASIDONE HYDROCHLORIDE 60 MG: 60 CAPSULE ORAL at 22:29

## 2021-03-21 RX ADMIN — SULFAMETHOXAZOLE AND TRIMETHOPRIM 1 TABLET: 800; 160 TABLET ORAL at 17:42

## 2021-03-21 RX ADMIN — SIMETHICONE 80 MG: 80 TABLET, CHEWABLE ORAL at 17:42

## 2021-03-21 RX ADMIN — PAROXETINE HYDROCHLORIDE 20 MG: 20 TABLET, FILM COATED ORAL at 09:10

## 2021-03-21 RX ADMIN — FLUDROCORTISONE ACETATE 0.1 MG: 0.1 TABLET ORAL at 05:28

## 2021-03-21 RX ADMIN — APIXABAN 5 MG: 5 TABLET, FILM COATED ORAL at 09:10

## 2021-03-21 RX ADMIN — ZIPRASIDONE HYDROCHLORIDE 60 MG: 60 CAPSULE ORAL at 10:50

## 2021-03-21 RX ADMIN — SIMETHICONE 80 MG: 80 TABLET, CHEWABLE ORAL at 12:19

## 2021-03-21 RX ADMIN — METOPROLOL SUCCINATE 25 MG: 25 TABLET, EXTENDED RELEASE ORAL at 05:28

## 2021-03-21 RX ADMIN — DOCUSATE SODIUM 50 MG AND SENNOSIDES 8.6 MG 2 TABLET: 8.6; 5 TABLET, FILM COATED ORAL at 09:10

## 2021-03-21 RX ADMIN — MIDODRINE HYDROCHLORIDE 5 MG: 5 TABLET ORAL at 17:42

## 2021-03-21 RX ADMIN — LEVOTHYROXINE SODIUM 112 MCG: 0.11 TABLET ORAL at 05:28

## 2021-03-21 RX ADMIN — TAMSULOSIN HYDROCHLORIDE 0.4 MG: 0.4 CAPSULE ORAL at 05:28

## 2021-03-21 ASSESSMENT — ENCOUNTER SYMPTOMS
COUGH: 0
FEVER: 0
ABDOMINAL PAIN: 0
HEADACHES: 0
NAUSEA: 0
EYES NEGATIVE: 1
DEPRESSION: 1
VOMITING: 0
MYALGIAS: 0
CHILLS: 0
FOCAL WEAKNESS: 0
SHORTNESS OF BREATH: 1

## 2021-03-21 NOTE — PROGRESS NOTES
Pt's personal belongings in belongings bag with label, placed in secured storage on unit near manager's office.

## 2021-03-21 NOTE — CONSULTS
"PSYCHOLOGICAL FOLLOW-UP:  Reason for admission: Syncope and collapse [R55]  Suicidal ideation [R45.851]  Sepsis due to Klebsiella pneumoniae (HCC) [A41.4]  Length of Visit: 30min    Legal status: Legal Status: Involuntary    Chief Complaint: \"I think a couple more days with the telesitter and I will be ok.\"    HPI: Met with the patient for brief individual psychotherapy. Patient presented with a brighter affect and reported a \"better\" mood. He still endorses suicidal thoughts however he reports they are not as severe. He believes he will be stable enough for outpatient in a few days as he has been feeling less anxious. Discussed how he can continue to practice the psychotherapy skills taught in previous lessons.     Psychiatric Examination:  Vitals: Blood pressure 136/70, pulse (!) 54, temperature (!) 35.7 °C (96.3 °F), temperature source Temporal, resp. rate 20, height 1.829 m (6'), weight (!) 188 kg (413 lb 5.8 oz), SpO2 96 %.  Musculoskeletal: normal psychomotor activity, no tics or unusual mannerisms noted  Appearance and Eye Contact: appropriate dress and grooming. Behavior is calm, cooperative,  appropriate eye contact  Attention/Alertness: Alert  Thought Process: Linear, Logical and Goal Directed    Thought Content: SI  Speech: Clear with normal rate and rhythm  Mood: \"better\"            Affect: brighter         SI/HI: Endorses SI but not as bad. No HI.     Memory: Recent and remote memory appear intact    Orientation: alert, oriented to person, place and time  Insight into symptoms: good  Judgement into symptoms:good    ASSESSMENT: The patient does appear to be experiencing a consistent decrease in his anxiety and the severity of his suicidality. He is better able to distance himself from his thoughts and think logically about his feelings remembering that the intensity of his anxiety will decrease as it has in the past. He is more hopeful and believes he will be stable enough for outpatient mental health " treatment in a couple days. I anticipate this too. Will discuss this further with the team psychiatrist who will follow him during the week.     DSM5 Diagnostic Considerations:   Major Depressive Disorder, recurrent, severe, with psychotic features  Unspecified Anxiety Disorder    PLAN:  Legal status: Involuntary   Tele-sitter ok  Anticipate F/U within 72 hours.   Records reviewed: yes  Discussed patient with other provider: yes  Will continue to follow  Thank you for the consult.    Madhuri Fernandez, Ph.D.

## 2021-03-21 NOTE — PROGRESS NOTES
Hospital Medicine Daily Progress Note    Date of Service  3/21/2021    Chief Complaint  56 y.o. male admitted 2/9/2021 with syncope    Hospital Course  This is a 56 year-old man with a past medical history significant for atrial fibrillation, secondary hypercoagulable state. Diabetes mellitus with glyco of 5.2 complicated with diabetic neuropathy, history of congestive systolic heart failure, dyslipidemia presented to the ER on 2/9/2021 after he had a syncopal episode.    Patient does have a history of orthostatic hypotension and was started on fludrocortisone.  For the event, patient complained of significant dizziness.  Upon presentation in ER, patient was hypotensive along with bradycardia thus cardiology was consulted and evaluated and started to stopping all AV sanket blocking agent.  During the stay in the hospital, patient orthostatic vital signs continue to remain positive-was a started on midodrine 5 mg every 6 hours    Evaluated in the past by EP and neurology recurrent syncopal episode.  Currently patient is on digoxin 125 cc p.o. daily, along with Eliquis 5 mg p.o. twice daily for atrial fibrillation.    Echocardiogram was obtained which showed EF of 60%, improved from previous echocardiogram obtained on 11/2020.    Patient has h/o spleen injury, splenic flexure perforation in September 2020. S/p colostomy and Interiano's pouch in November 2020. He was at home taking care of his colostomy, however he has become depressed, overwhelmed and can no longer take care of his colostomy independently.    Psych continue to follow the patient, psychiatric medication as per psych recommendations.    Febrile to 38.1 C overnight.   Developed hypersensitivity rash and itching due to zosyn.   Switched to ciprofloxacin and treated with benadryl.  BCx resulted as pan-susceptible Klebsiella pneumo.   Discussed with pharmacist Ashley and decided on bactrim.     He reports that the itching and rash are improving.  Gas is better  today than it was yesterday.  Leg swelling feels better.  He denies dyspnea, pain, N/V, chills, dysuria, bowel dysfunction.        Emotionally he is having a hard time from feeling sick.  He reports that staff has been consistently kind to him since yesterday.  Seen by psychiatrist Dr. Easley yesterday, no changes in medication.  3/16: Resting in bed comfortable.  Pruritic rash noted on upper extremities, chest, and neck.  Afebrile.  Hemodynamically stable.  Saturating well on room air.  Still admits to suicidal ideation.  Sitter at bedside.  No acute distress noted.  No issues overnight per staff.  3/17: Resting in bed comfortably. No acute distress noted.  Still have pleuritic rash all over.  Hemodynamically stable.  Saturating well on room air.  Sitter at bedside.  Still admits to suicidal ideation.  No acute distress noted.  No issues overnight per staff.  3/18: Resting in bed comfortably.  Erythema and rash much improved.  Hemodynamically stable.  Febrile.  Kidney functions improving.  Still admits to suicidal ideation.  Received training for ostomy self-care today.  No acute distress noted.  No issues overnight per staff.  3/19: Resting in bed comfortably.  Rash much improved.  Pruritus much improved as well.  Zabala catheter was removed with successful voiding trials.  Kidney function is much improved.  Hemodynamically stable.  Afebrile.  Still suicidal.  Received training for colostomy self-care yesterday and will continue today.  No acute distress noted.  No issues overnight per staff.  3/20: Resting in bed comfortable.  Rash and pruritus much improved.  Hemodynamically stable.  Afebrile.  Still requiring more training on self ostomy care.  Still admits to suicidal ideation.  Sitter at bedside.  No issues overnight per staff.    Interval Problem Update  3/21: Patient reports SOB, depression, suicidal ideation. Afebrile, hemodynamically stable.  Tele-sitter in  place.    Consultants/Specialty  Psychiatry- SI  Cardiology - Hypotension and Afib    Code Status  Full Code    Disposition  To inpatient psychiatry pending improvement in sepsis, hypotension, and hypoxic respiratory failure.  Must be independently managing ostomy prior to transfer to psych facility.    Review of Systems  Review of Systems   Constitutional: Positive for malaise/fatigue. Negative for chills and fever.   HENT: Negative.    Eyes: Negative.    Respiratory: Positive for shortness of breath. Negative for cough.    Cardiovascular: Negative for chest pain.   Gastrointestinal: Negative for abdominal pain, nausea and vomiting.   Genitourinary: Negative for dysuria.   Musculoskeletal: Negative for myalgias.   Skin: Negative for rash.   Neurological: Negative for focal weakness and headaches.   Psychiatric/Behavioral: Positive for depression and suicidal ideas.        Physical Exam  Temp:  [35.7 °C (96.3 °F)-36.6 °C (97.8 °F)] 35.7 °C (96.3 °F)  Pulse:  [54-64] 54  Resp:  [17-20] 20  BP: (102-149)/(52-80) 136/70  SpO2:  [89 %-96 %] 96 %    Physical Exam  Vitals and nursing note reviewed.   Constitutional:       Appearance: He is ill-appearing.   HENT:      Head: Normocephalic.      Mouth/Throat:      Mouth: Mucous membranes are moist.      Pharynx: Oropharynx is clear.   Eyes:      Pupils: Pupils are equal, round, and reactive to light.   Cardiovascular:      Rate and Rhythm: Normal rate and regular rhythm.      Heart sounds: Normal heart sounds.   Pulmonary:      Effort: Pulmonary effort is normal. No respiratory distress.      Breath sounds: Normal breath sounds. No wheezing.   Abdominal:      General: Abdomen is flat. Bowel sounds are normal.      Comments: Colostoma in place in left abdomen   Musculoskeletal:         General: Normal range of motion.      Cervical back: Normal range of motion.   Skin:     General: Skin is warm.   Neurological:      General: No focal deficit present.      Mental Status: He is  alert and oriented to person, place, and time.   Psychiatric:         Mood and Affect: Mood is depressed.         Thought Content: Thought content includes suicidal ideation.         Fluids    Intake/Output Summary (Last 24 hours) at 3/21/2021 1001  Last data filed at 3/21/2021 0500  Gross per 24 hour   Intake 480 ml   Output 841 ml   Net -361 ml       Laboratory  Recent Labs     03/19/21  0335 03/20/21  0315   WBC 16.7* 16.3*   RBC 4.73 4.89   HEMOGLOBIN 13.5* 13.8*   HEMATOCRIT 41.0* 42.8   MCV 86.7 87.5   MCH 28.5 28.2   MCHC 32.9* 32.2*   RDW 45.1 45.1   PLATELETCT 434 447*   MPV 10.8 10.6     Recent Labs     03/19/21  0335 03/20/21  0315   SODIUM 135 136   POTASSIUM 3.7 3.5*   CHLORIDE 100 99   CO2 25 28   GLUCOSE 208* 177*   BUN 32* 28*   CREATININE 0.92 0.86   CALCIUM 9.1 8.9                   Imaging  TP-[id number]-6 VIEW   Final Result         No significant interval change.      DX-CHEST-LIMITED (1 VIEW)   Final Result         Diffuse interstitial prominence could relate to mild pulmonary edema or atypical infection      FR-YUGIRLB-6 VIEW   Final Result      Increased colonic gas without definite bowel obstruction.      US-RENAL   Final Result      No evidence of hydronephrosis.      Lobulated kidneys bilaterally.      CT-ABDOMEN-PELVIS WITH   Final Result      1.  There is no evidence of small bowel obstruction.   2.  There is a left lower quadrant ostomy.   3.  There is fluid distention of the colon distal to the surgical material in the left mid descending colon extending all the way to the rectum. There is no pneumatosis or free air.   4.  There is cholelithiasis without biliary dilatation.   5.  There has been interval removal of the drainage catheter anterior to the spleen with minimal hypodense area in the anterior spleen again noted. There is no new fluid collection.      IR-US GUIDED PIV   Final Result    Ultrasound-guided PERIPHERAL IV INSERTION performed by    qualified nursing staff as above.       EC-ECHOCARDIOGRAM COMPLETE W/O CONT   Final Result      DX-LUMBAR SPINE-2 OR 3 VIEWS   Final Result      Moderate compression deformity of T11 is new compared to 2018.      Mild wedge deformity of T12 is unchanged.      Degenerative changes including facet arthropathy.      Mild retrolisthesis of L5 on S1 and L3 on L4.              Assessment/Plan  * Suicidal ideation- (present on admission)  Assessment & Plan  Has history of MDD, recurrent with psychotic features with active SI w/ plan.  Psychiatry consulted, continued legal hold  Legal hold until 3/18  To be discharged to psychiatric program pending medical clearance and when able to self-care for ostomy    Cardiogenic pulmonary edema (HCC)  Assessment & Plan  CXR demonstrates pulmonary vascular congestion / edema  NT-BNP 7828  Due to IV boluses for hypotension and N/V in setting of HFrEF with recovered EF  IV lasix tolerated with improvement in BP and Cr, discontinued 3/14 for severe sepsis and lactic acidosis  3/16: Currently saturating well on room air.    Major depressive disorder, recurrent, severe with psychotic features (HCC)- (present on admission)  Assessment & Plan  Psychiatry and Psychology following  Continue paroxetine, ziprasidone, and clonazepam    Secondary adrenal insufficiency (HCC)- (present on admission)  Assessment & Plan  Cosyntropin stimulation test reviewed with only a increase in his cortisol going from 14.5 then up to 21.3 after cosyntropin given  Renin 0.1, ACTH 3.4 and slight increase with cosyntropin suggesting secondary AI  Random cortisol 9.9  Needs outpatient endocrinology follow up.  Repeat adrenal function after discharge  Repeat AM BMP    Florinef restarted 3/14 at 0.2 mg daily (2x dose) due to severe sepsis from GNR bacteremia in order to prevent adrenal crisis    Sepsis with acute renal failure without septic shock (HCC)  Assessment & Plan  This is Severe Sepsis Not present on admission  SIRS criteria identified on my  evaluation include: Tachycardia, with heart rate greater than 90 BPM and Leukocytosis, with WBC greater than 12,000  Source of infection is GNR bacteremia  Clinical indicators of end organ dysfunction include Systolic blood pressure (SBP) <90 mmHg or mean arterial pressure <65 mmHg and Lactate >2 mmol/L (18.0 mg/dL)  Sepsis protocol initiated  Fluid resuscitation not ordered due to pulmonary edema seen on CXR due to HFrEF  IV antibiotics as appropriate for source of sepsis - Zosyn    BCx x2 (3/13) +GNR in one bottle, awaiting speciation and susceptbilities  UCx added to UA (3/13) that showed pyuria and bacteruria  Cdiff negative (3/12)  Prior microbiology date reviewed, h/o enterococcus and MRSA but currently not positive for GPCs    Reassessment: I have reassessed the patient's hemodynamic status  End organ dysfunction include(s):  Acute kidney failure (SRUTHI)  Cr and BP improved after diuresis yesterday for cardiogenic pulmonary edema  Lactic Acid 2.8, improved with holding diuresis    Orthostatic hypotension- (present on admission)  Assessment & Plan  Multifactorial including adrenal insufficiency, medications (beta blocker, CCB, tamsulosin), diabetic autonomic dysfunction, and venous insufficiency  EF is noted to be 60%, significant improvement from prior.  Per cardiology, no further cardiac work-up needed  Follow-up with cardiology as an outpatient.  Continue midodrine, PRN IVF    Bacteremia  Assessment & Plan  Due to klebsiella bacteremia  BCx: 1 of 2 bottles +Klebsiella  Cdiff negative  UA: Bacteruria and pyuria  UCx: In progress  CXR with pulmonary edema but no infiltrates  3/16: Currently on Ancef.  3/19: Switch him to Bactrim to complete a 10-day course.        Abdominal bloating  Assessment & Plan  Improved  Had increasing gas output from ostomy  Intermittent episodes of abdominal pain and N/V  CT 3/4 did not demonstrate obstruction nor pneumotosis  KUB 3/12 for recurrent symptoms demonstrated increased gas  but no signs of SBO  Simethicone scheduled 80 mg TID  If uncontrolled, would confer with general surgeon about further imaging and discuss empiric treatment for SIBO    Normal anion gap metabolic acidosis  Assessment & Plan  Resolved  Due to bicarbonate loss via ostomy  Repeat AM BMP    Syncope due to orthostatic hypotension- (present on admission)  Assessment & Plan  Likely secondary to orthostatic hypotension  Echo normal with 60% ejection fraction, improved from prior    Paroxysmal atrial fibrillation (HCC)- (present on admission)  Assessment & Plan  CHADSVASC 3 (HTN, CHF, DM).   TTE Feb 2021: EF 60%  Continue metoprolol, digoxin, and eliquis  Discontinued telemetry    Chronic heart failure with preserved ejection fraction (HCC)- (present on admission)  Assessment & Plan  Decompensated based on peripheral and pulmonary edema, mild hyponatremia  IV diuresis initiated 3/13 for pulmonary edema, held 3/14 in setting of lactic acidosis from severe sepsis  Recovered EF 60% on repeat echo.  Continue metoprolol XL  Lisinopril held per Cardiology recommendations due to hypotension    Diabetes mellitus type 2, insulin dependent (HCC)- (present on admission)  Assessment & Plan  A1c 5.2  Diabetic diet  Restarting accuchecks and SSI due to GNR sepsis  BG goal <200 with bacteremia  Continue atorvastatin    Colostomy present (HCC)- (present on admission)  Assessment & Plan  History of fall at home Nov 2020 on left side with perforated colon and splenic fluid collection/hematoma s/p segmental colectomy, colostomy, mobilization of the splenic flexure, wound vac, and I&D Nov 2020.   Ostomy care consulted, but he was non-participatory  He is engaging in ostomy care with his bedside RNs, encouraged to be observed but do it independently  Patient must be independently able to care for his ostomy before inpatient psychiatry will accept him  Reversal timing and workup after 6 months per [facility]  3/16: Patient has to  perform self ostomy care in order to be transferred to inpatient psychiatric facility.  3/18: Patient to receive training for self ostomy care today.    Anxiety and depression- (present on admission)  Assessment & Plan  On Legal Hold until 3/18  Psychiatry consulted  Continue paroxetine, ziprasidone, and clonazepam  Would likely benefit from CBT or DBT  3/16: Psychiatry recommending transferring to inpatient psychiatric facility once medically cleared.  However patient has to perform self ostomy care which has been challenging.  Management and  working on best safe discharge plan.    Hypothyroidism- (present on admission)  Assessment & Plan  TSH wnl Feb 2021  Continue home Levothyroxine    Acute renal failure superimposed on stage 3 chronic kidney disease (HCC)- (present on admission)  Assessment & Plan  CKD 3  Had improved with increased midodrine and diuresis, but worsened this morning in setting of holding diuresis for hypotension and lactic acidosis  500 cc NS bolus today, repeat BMP this afternoon  Developed AHRF with pulmonary edema from 1 L bolus, judicious IVF  Diuresis held, continue midodrine  3/16: Discontinue Bactrim as it will worsen kidney functions. Slowly improving.  Avoid nephrotoxins.  Renally dose all medications.  3/18: Back to baseline.    Debility- (present on admission)  Assessment & Plan  Likely due to depression  PT/OT evaluated, no further skilled needs    Essential hypertension- (present on admission)  Assessment & Plan  Resolved, has been hypotensive this admission in setting of secondary adrenal insufficiency  Continue midodrine    Lower limb amputation, great toe (HCC)- (present on admission)  Assessment & Plan  s/p right great toe amputation 2018    Hypomagnesemia  Assessment & Plan  Mg WNL today  Repeat AM BMP/Mg    Hypokalemia  Assessment & Plan  K 3.2  Lasix held yesterday due to hypotension and lactic acidosis  Continue Kdur 20 daily  Discussed with pharmacy, no  additional K today due to risk of hyperkalemia with bactrim  Mg WNL  Repeat AM BMP/Mg    Hyponatremia  Assessment & Plan  Moderate, Na 127  In setting of IV lasix, holding lasix due to severe sepsis with lactic acidosis  500 cc NS trial today, repeat afternoon BMP  If worsening will get Delmis and Uosm  Repeat AM BMP    Obstructive uropathy- (present on admission)  Assessment & Plan  Urinary retention on bladder scans by straight cath.  Zabala catheter was placed temporarily.  Remove with voiding trials per nursing protocol.  Started on Flomax.  3/19: Zabala catheter was removed.  Voiding trials successful so far.    Rash- (present on admission)  Assessment & Plan  Patient developed pruritic drug related rash   Prn Benadryl, steroids   3/18: Resolving.    Chronic venous insufficiency of lower extremity- (present on admission)  Assessment & Plan  Compression stockings    Elevated troponin- (present on admission)  Assessment & Plan  RESOLVED  Stable. Suspect due to demand ischemia. No need to trend further.    Nonischemic cardiomyopathy (HCC)- (present on admission)  Assessment & Plan  Normal coronaries, 2018.  Tachycardia mediated per cardiology  LVEF improved, based on echo 2/10/2021 LVEF 60%    Chest pain- (present on admission)  Assessment & Plan  Resolved  EKG and Troponins not consistent with ACS    Mixed hyperlipidemia- (present on admission)  Assessment & Plan  Continue atorvastatin    Obesity- (present on admission)  Assessment & Plan  Body mass index is 37.79 kg/m².  Counseled about diet and exercise       VTE prophylaxis: apixaban

## 2021-03-21 NOTE — CARE PLAN
Problem: Communication  Goal: The ability to communicate needs accurately and effectively will improve  Outcome: PROGRESSING AS EXPECTED  Note: Pt is permitted call light. White board updated. Hourly rounding in progress. Telesitter camera in room for safety.      Problem: Safety  Goal: Will remain free from injury  Outcome: PROGRESSING AS EXPECTED  Note: Room safety checklist in use. Room assessed for dangerous objects.

## 2021-03-22 LAB
ALBUMIN SERPL BCP-MCNC: 2.9 G/DL (ref 3.2–4.9)
ALBUMIN/GLOB SERPL: 0.9 G/DL
ALP SERPL-CCNC: 99 U/L (ref 30–99)
ALT SERPL-CCNC: 45 U/L (ref 2–50)
ANION GAP SERPL CALC-SCNC: 6 MMOL/L (ref 7–16)
AST SERPL-CCNC: 42 U/L (ref 12–45)
BASOPHILS # BLD AUTO: 0 % (ref 0–1.8)
BASOPHILS # BLD: 0 K/UL (ref 0–0.12)
BILIRUB SERPL-MCNC: 0.2 MG/DL (ref 0.1–1.5)
BUN SERPL-MCNC: 29 MG/DL (ref 8–22)
CALCIUM SERPL-MCNC: 9.1 MG/DL (ref 8.5–10.5)
CHLORIDE SERPL-SCNC: 104 MMOL/L (ref 96–112)
CO2 SERPL-SCNC: 30 MMOL/L (ref 20–33)
CREAT SERPL-MCNC: 1 MG/DL (ref 0.5–1.4)
EOSINOPHIL # BLD AUTO: 0.24 K/UL (ref 0–0.51)
EOSINOPHIL NFR BLD: 1.8 % (ref 0–6.9)
ERYTHROCYTE [DISTWIDTH] IN BLOOD BY AUTOMATED COUNT: 47.2 FL (ref 35.9–50)
GLOBULIN SER CALC-MCNC: 3.1 G/DL (ref 1.9–3.5)
GLUCOSE BLD-MCNC: 107 MG/DL (ref 65–99)
GLUCOSE BLD-MCNC: 115 MG/DL (ref 65–99)
GLUCOSE BLD-MCNC: 116 MG/DL (ref 65–99)
GLUCOSE BLD-MCNC: 121 MG/DL (ref 65–99)
GLUCOSE SERPL-MCNC: 122 MG/DL (ref 65–99)
HCT VFR BLD AUTO: 43.4 % (ref 42–52)
HGB BLD-MCNC: 13.9 G/DL (ref 14–18)
LYMPHOCYTES # BLD AUTO: 5.47 K/UL (ref 1–4.8)
LYMPHOCYTES NFR BLD: 40.2 % (ref 22–41)
MANUAL DIFF BLD: NORMAL
MCH RBC QN AUTO: 28.6 PG (ref 27–33)
MCHC RBC AUTO-ENTMCNC: 32 G/DL (ref 33.7–35.3)
MCV RBC AUTO: 89.3 FL (ref 81.4–97.8)
MONOCYTES # BLD AUTO: 0.84 K/UL (ref 0–0.85)
MONOCYTES NFR BLD AUTO: 6.2 % (ref 0–13.4)
MORPHOLOGY BLD-IMP: NORMAL
NEUTROPHILS # BLD AUTO: 7.04 K/UL (ref 1.82–7.42)
NEUTROPHILS NFR BLD: 51.8 % (ref 44–72)
NRBC # BLD AUTO: 0 K/UL
NRBC BLD-RTO: 0 /100 WBC
PLATELET # BLD AUTO: 420 K/UL (ref 164–446)
PLATELET BLD QL SMEAR: NORMAL
PMV BLD AUTO: 9.6 FL (ref 9–12.9)
POTASSIUM SERPL-SCNC: 4.2 MMOL/L (ref 3.6–5.5)
PROT SERPL-MCNC: 6 G/DL (ref 6–8.2)
RBC # BLD AUTO: 4.86 M/UL (ref 4.7–6.1)
RBC BLD AUTO: NORMAL
SODIUM SERPL-SCNC: 140 MMOL/L (ref 135–145)
WBC # BLD AUTO: 13.6 K/UL (ref 4.8–10.8)

## 2021-03-22 PROCEDURE — 99232 SBSQ HOSP IP/OBS MODERATE 35: CPT | Performed by: STUDENT IN AN ORGANIZED HEALTH CARE EDUCATION/TRAINING PROGRAM

## 2021-03-22 PROCEDURE — 700102 HCHG RX REV CODE 250 W/ 637 OVERRIDE(OP): Performed by: STUDENT IN AN ORGANIZED HEALTH CARE EDUCATION/TRAINING PROGRAM

## 2021-03-22 PROCEDURE — A9270 NON-COVERED ITEM OR SERVICE: HCPCS | Performed by: HOSPITALIST

## 2021-03-22 PROCEDURE — 700102 HCHG RX REV CODE 250 W/ 637 OVERRIDE(OP): Performed by: FAMILY MEDICINE

## 2021-03-22 PROCEDURE — A9270 NON-COVERED ITEM OR SERVICE: HCPCS | Performed by: STUDENT IN AN ORGANIZED HEALTH CARE EDUCATION/TRAINING PROGRAM

## 2021-03-22 PROCEDURE — 80053 COMPREHEN METABOLIC PANEL: CPT

## 2021-03-22 PROCEDURE — 82962 GLUCOSE BLOOD TEST: CPT | Mod: 91

## 2021-03-22 PROCEDURE — 85007 BL SMEAR W/DIFF WBC COUNT: CPT

## 2021-03-22 PROCEDURE — 36415 COLL VENOUS BLD VENIPUNCTURE: CPT

## 2021-03-22 PROCEDURE — A9270 NON-COVERED ITEM OR SERVICE: HCPCS | Performed by: FAMILY MEDICINE

## 2021-03-22 PROCEDURE — 85027 COMPLETE CBC AUTOMATED: CPT

## 2021-03-22 PROCEDURE — 770001 HCHG ROOM/CARE - MED/SURG/GYN PRIV*

## 2021-03-22 PROCEDURE — 700102 HCHG RX REV CODE 250 W/ 637 OVERRIDE(OP): Performed by: HOSPITALIST

## 2021-03-22 RX ADMIN — DOCUSATE SODIUM 50 MG AND SENNOSIDES 8.6 MG 2 TABLET: 8.6; 5 TABLET, FILM COATED ORAL at 20:57

## 2021-03-22 RX ADMIN — MIDODRINE HYDROCHLORIDE 5 MG: 5 TABLET ORAL at 07:47

## 2021-03-22 RX ADMIN — SIMETHICONE 80 MG: 80 TABLET, CHEWABLE ORAL at 10:56

## 2021-03-22 RX ADMIN — DOCUSATE SODIUM 50 MG AND SENNOSIDES 8.6 MG 2 TABLET: 8.6; 5 TABLET, FILM COATED ORAL at 07:47

## 2021-03-22 RX ADMIN — CLONAZEPAM 1 MG: 1 TABLET ORAL at 17:35

## 2021-03-22 RX ADMIN — PAROXETINE HYDROCHLORIDE 20 MG: 20 TABLET, FILM COATED ORAL at 07:47

## 2021-03-22 RX ADMIN — SULFAMETHOXAZOLE AND TRIMETHOPRIM 1 TABLET: 800; 160 TABLET ORAL at 17:35

## 2021-03-22 RX ADMIN — POTASSIUM CHLORIDE 20 MEQ: 20 TABLET, EXTENDED RELEASE ORAL at 06:00

## 2021-03-22 RX ADMIN — OMEPRAZOLE 20 MG: 20 CAPSULE, DELAYED RELEASE ORAL at 05:59

## 2021-03-22 RX ADMIN — INSULIN GLARGINE 5 UNITS: 100 INJECTION, SOLUTION SUBCUTANEOUS at 06:01

## 2021-03-22 RX ADMIN — DIGOXIN 125 MCG: 125 TABLET ORAL at 17:35

## 2021-03-22 RX ADMIN — MIDODRINE HYDROCHLORIDE 5 MG: 5 TABLET ORAL at 10:56

## 2021-03-22 RX ADMIN — LEVOTHYROXINE SODIUM 112 MCG: 0.11 TABLET ORAL at 06:00

## 2021-03-22 RX ADMIN — ATORVASTATIN CALCIUM 40 MG: 40 TABLET, FILM COATED ORAL at 17:35

## 2021-03-22 RX ADMIN — SULFAMETHOXAZOLE AND TRIMETHOPRIM 1 TABLET: 800; 160 TABLET ORAL at 06:00

## 2021-03-22 RX ADMIN — CLONAZEPAM 0.5 MG: 0.5 TABLET ORAL at 07:47

## 2021-03-22 RX ADMIN — SIMETHICONE 80 MG: 80 TABLET, CHEWABLE ORAL at 17:35

## 2021-03-22 RX ADMIN — ZIPRASIDONE HYDROCHLORIDE 60 MG: 60 CAPSULE ORAL at 20:57

## 2021-03-22 RX ADMIN — FLUDROCORTISONE ACETATE 0.1 MG: 0.1 TABLET ORAL at 06:00

## 2021-03-22 RX ADMIN — TAMSULOSIN HYDROCHLORIDE 0.4 MG: 0.4 CAPSULE ORAL at 06:00

## 2021-03-22 RX ADMIN — APIXABAN 5 MG: 5 TABLET, FILM COATED ORAL at 20:58

## 2021-03-22 RX ADMIN — MIDODRINE HYDROCHLORIDE 5 MG: 5 TABLET ORAL at 17:35

## 2021-03-22 RX ADMIN — APIXABAN 5 MG: 5 TABLET, FILM COATED ORAL at 07:48

## 2021-03-22 NOTE — PROGRESS NOTES
Hospital Medicine Daily Progress Note    Date of Service  3/22/2021    Chief Complaint  56 y.o. male admitted 2/9/2021 with syncope    Hospital Course  This is a 56 year-old man with a past medical history significant for atrial fibrillation, secondary hypercoagulable state. Diabetes mellitus with glyco of 5.2 complicated with diabetic neuropathy, history of congestive systolic heart failure, dyslipidemia presented to the ER on 2/9/2021 after he had a syncopal episode.     Patient does have a history of orthostatic hypotension and was started on fludrocortisone.  For the event, patient complained of significant dizziness.  Upon presentation in ER, patient was hypotensive along with bradycardia thus cardiology was consulted and evaluated and started to stopping all AV sanket blocking agent.  During the stay in the hospital, patient orthostatic vital signs continue to remain positive-was a started on midodrine 5 mg every 6 hours     Evaluated in the past by EP and neurology recurrent syncopal episode.  Currently patient is on digoxin 125 cc p.o. daily, along with Eliquis 5 mg p.o. twice daily for atrial fibrillation.     Echocardiogram was obtained which showed EF of 60%, improved from previous echocardiogram obtained on 11/2020.     Patient has h/o spleen injury, splenic flexure perforation in September 2020. S/p colostomy and Interiano's pouch in November 2020. He was at home taking care of his colostomy, however he has become depressed, overwhelmed and can no longer take care of his colostomy independently.     Psych continue to follow the patient, psychiatric medication as per psych recommendations.     Febrile to 38.1 C overnight.   Developed hypersensitivity rash and itching due to zosyn.   Switched to ciprofloxacin and treated with benadryl.  BCx resulted as pan-susceptible Klebsiella pneumo.   Discussed with pharmacist Ashley and decided on bactrim.     He reports that the itching and rash are improving.  Gas is  better today than it was yesterday.  Leg swelling feels better.  He denies dyspnea, pain, N/V, chills, dysuria, bowel dysfunction.        Emotionally he is having a hard time from feeling sick.  He reports that staff has been consistently kind to him since yesterday.  Seen by psychiatrist Dr. Easley yesterday, no changes in medication.  3/16: Resting in bed comfortable.  Pruritic rash noted on upper extremities, chest, and neck.  Afebrile.  Hemodynamically stable.  Saturating well on room air.  Still admits to suicidal ideation.  Sitter at bedside.  No acute distress noted.  No issues overnight per staff.  3/17: Resting in bed comfortably. No acute distress noted.  Still have pleuritic rash all over.  Hemodynamically stable.  Saturating well on room air.  Sitter at bedside.  Still admits to suicidal ideation.  No acute distress noted.  No issues overnight per staff.  3/18: Resting in bed comfortably.  Erythema and rash much improved.  Hemodynamically stable.  Febrile.  Kidney functions improving.  Still admits to suicidal ideation.  Received training for ostomy self-care today.  No acute distress noted.  No issues overnight per staff.  3/19: Resting in bed comfortably.  Rash much improved.  Pruritus much improved as well.  Zabala catheter was removed with successful voiding trials.  Kidney function is much improved.  Hemodynamically stable.  Afebrile.  Still suicidal.  Received training for colostomy self-care yesterday and will continue today.  No acute distress noted.  No issues overnight per staff.  3/20: Resting in bed comfortable.  Rash and pruritus much improved.  Hemodynamically stable.  Afebrile.  Still requiring more training on self ostomy care.  Still admits to suicidal ideation.  Sitter at bedside.  No issues overnight per staff.     Interval Problem Update  3/21: Patient reports SOB, depression, suicidal ideation. Afebrile, hemodynamically stable.  Tele-sitter in place.    Interval Problem  Update  3/22: Patient reports visual and auditory hallucinations. Denies any pain, SOB. Afebrile, hemodynamically stable.  Blood cultures from 3/16 negative to date.  WBC 13.6 down trending.  PT, OT recommended post-acute placement.  Psychiatry following  Dr. Desouza planning for reversal of ostomy on Thursday.      Consultants/Specialty  Psychiatry- SI  Cardiology - Hypotension and Afib    Code Status  Full Code    Disposition  To inpatient psychiatry pending improvement in sepsis, hypotension, and hypoxic respiratory failure.  Must be independently managing ostomy prior to transfer to psych facility.    Review of Systems  Review of Systems   Constitutional: Positive for malaise/fatigue. Negative for chills and fever.   HENT: Negative.    Eyes: Negative.    Respiratory: Positive for shortness of breath. Negative for cough.    Cardiovascular: Negative for chest pain.   Gastrointestinal: Negative for abdominal pain, nausea and vomiting.   Genitourinary: Negative for dysuria.   Musculoskeletal: Negative for myalgias.   Skin: Negative for rash.   Neurological: Negative for focal weakness and headaches.   Psychiatric/Behavioral: Positive for depression and suicidal ideas, auditory and visual hallucinations.     Physical Exam  Temp:  [36.1 °C (97 °F)-36.6 °C (97.8 °F)] 36.3 °C (97.4 °F)  Pulse:  [52-58] 57  Resp:  [18-20] 20  BP: (100-136)/(53-68) 136/62  SpO2:  [90 %-96 %] 93 %    Physical Exam  Vitals and nursing note reviewed.   Constitutional:       Appearance: He is ill-appearing.   HENT:      Head: Normocephalic.      Mouth/Throat:      Mouth: Mucous membranes are moist.      Pharynx: Oropharynx is clear.   Eyes:      Pupils: Pupils are equal, round, and reactive to light.   Cardiovascular:      Rate and Rhythm: Normal rate and regular rhythm.      Heart sounds: Normal heart sounds.   Pulmonary:      Effort: Pulmonary effort is normal. No respiratory distress.      Breath sounds: Normal breath sounds. No wheezing.    Abdominal:      General: Abdomen is flat. Bowel sounds are normal.      Comments: Colostoma in place in left abdomen   Musculoskeletal:         General: Normal range of motion.      Cervical back: Normal range of motion.   Skin:     General: Skin is warm.   Neurological:      General: No focal deficit present.      Mental Status: He is alert and oriented to person, place, and time.   Psychiatric:         Mood and Affect: Mood is depressed.         Thought Content: Thought content includes suicidal ideation.     Fluids    Intake/Output Summary (Last 24 hours) at 3/22/2021 1532  Last data filed at 3/22/2021 1300  Gross per 24 hour   Intake --   Output 1825 ml   Net -1825 ml       Laboratory  Recent Labs     03/20/21  0315 03/21/21  1102 03/22/21  0550   WBC 16.3* 14.2* 13.6*   RBC 4.89 4.97 4.86   HEMOGLOBIN 13.8* 14.3 13.9*   HEMATOCRIT 42.8 44.2 43.4   MCV 87.5 88.9 89.3   MCH 28.2 28.8 28.6   MCHC 32.2* 32.4* 32.0*   RDW 45.1 46.6 47.2   PLATELETCT 447* 468* 420   MPV 10.6 9.5 9.6     Recent Labs     03/20/21  0315 03/21/21  1102 03/22/21  0550   SODIUM 136 140 140   POTASSIUM 3.5* 3.6 4.2   CHLORIDE 99 102 104   CO2 28 30 30   GLUCOSE 177* 145* 122*   BUN 28* 26* 29*   CREATININE 0.86 0.84 1.00   CALCIUM 8.9 9.3 9.1                   Imaging  [id number] VIEW   Final Result         No significant interval change.      DX-CHEST-LIMITED (1 VIEW)   Final Result         Diffuse interstitial prominence could relate to mild pulmonary edema or atypical infection      [id number] VIEW   Final Result      Increased colonic gas without definite bowel obstruction.      US-RENAL   Final Result      No evidence of hydronephrosis.      Lobulated kidneys bilaterally.      CT-ABDOMEN-PELVIS WITH   Final Result      1.  There is no evidence of small bowel obstruction.   2.  There is a left lower quadrant ostomy.   3.  There is fluid distention of the colon distal to the surgical material in the left mid descending colon  extending all the way to the rectum. There is no pneumatosis or free air.   4.  There is cholelithiasis without biliary dilatation.   5.  There has been interval removal of the drainage catheter anterior to the spleen with minimal hypodense area in the anterior spleen again noted. There is no new fluid collection.      IR-US GUIDED PIV   Final Result    Ultrasound-guided PERIPHERAL IV INSERTION performed by    qualified nursing staff as above.      EC-ECHOCARDIOGRAM COMPLETE W/O CONT   Final Result      DX-LUMBAR SPINE-2 OR 3 VIEWS   Final Result      Moderate compression deformity of T11 is new compared to 2018.      Mild wedge deformity of T12 is unchanged.      Degenerative changes including facet arthropathy.      Mild retrolisthesis of L5 on S1 and L3 on L4.              Assessment/Plan  * Suicidal ideation- (present on admission)  Assessment & Plan  Has history of MDD, recurrent with psychotic features with active SI w/ plan.  Psychiatry consulted, continued legal hold  Legal hold until 3/18  To be discharged to psychiatric program pending medical clearance and when able to self-care for ostomy    Cardiogenic pulmonary edema (HCC)  Assessment & Plan  CXR demonstrates pulmonary vascular congestion / edema  NT-BNP 7828  Due to IV boluses for hypotension and N/V in setting of HFrEF with recovered EF  IV lasix tolerated with improvement in BP and Cr, discontinued 3/14 for severe sepsis and lactic acidosis  3/16: Currently saturating well on room air.    Major depressive disorder, recurrent, severe with psychotic features (HCC)- (present on admission)  Assessment & Plan  Psychiatry and Psychology following  Continue paroxetine, ziprasidone, and clonazepam    Secondary adrenal insufficiency (HCC)- (present on admission)  Assessment & Plan  Cosyntropin stimulation test reviewed with only a increase in his cortisol going from 14.5 then up to 21.3 after cosyntropin given  Renin 0.1, ACTH 3.4 and slight increase with  cosyntropin suggesting secondary AI  Random cortisol 9.9  Needs outpatient endocrinology follow up.  Repeat adrenal function after discharge  Repeat AM BMP    Florinef restarted 3/14 at 0.2 mg daily (2x dose) due to severe sepsis from GNR bacteremia in order to prevent adrenal crisis    Sepsis with acute renal failure without septic shock (HCC)  Assessment & Plan  This is Severe Sepsis Not present on admission  SIRS criteria identified on my evaluation include: Tachycardia, with heart rate greater than 90 BPM and Leukocytosis, with WBC greater than 12,000  Source of infection is GNR bacteremia  Clinical indicators of end organ dysfunction include Systolic blood pressure (SBP) <90 mmHg or mean arterial pressure <65 mmHg and Lactate >2 mmol/L (18.0 mg/dL)  Sepsis protocol initiated  Fluid resuscitation not ordered due to pulmonary edema seen on CXR due to HFrEF  IV antibiotics as appropriate for source of sepsis - Zosyn    BCx x2 (3/13) +GNR in one bottle, awaiting speciation and susceptbilities  UCx added to UA (3/13) that showed pyuria and bacteruria  Cdiff negative (3/12)  Prior microbiology date reviewed, h/o enterococcus and MRSA but currently not positive for GPCs    Reassessment: I have reassessed the patient's hemodynamic status  End organ dysfunction include(s):  Acute kidney failure (SRUTHI)  Cr and BP improved after diuresis yesterday for cardiogenic pulmonary edema  Lactic Acid 2.8, improved with holding diuresis    Orthostatic hypotension- (present on admission)  Assessment & Plan  Multifactorial including adrenal insufficiency, medications (beta blocker, CCB, tamsulosin), diabetic autonomic dysfunction, and venous insufficiency  EF is noted to be 60%, significant improvement from prior.  Per cardiology, no further cardiac work-up needed  Follow-up with cardiology as an outpatient.  Continue midodrine, PRN IVF    Bacteremia  Assessment & Plan  Due to klebsiella bacteremia  BCx: 1 of 2 bottles  +Klebsiella  Cdiff negative  UA: Bacteruria and pyuria  UCx: In progress  CXR with pulmonary edema but no infiltrates  3/16: Currently on Ancef.  3/19: Switch him to Bactrim to complete a 10-day course.    Abdominal bloating  Assessment & Plan  Improved  Had increasing gas output from ostomy  Intermittent episodes of abdominal pain and N/V  CT 3/4 did not demonstrate obstruction nor pneumotosis  KUB 3/12 for recurrent symptoms demonstrated increased gas but no signs of SBO  Simethicone scheduled 80 mg TID  If uncontrolled, would confer with general surgeon about further imaging and discuss empiric treatment for SIBO    Normal anion gap metabolic acidosis  Assessment & Plan  Resolved  Due to bicarbonate loss via ostomy  Repeat AM BMP    Syncope due to orthostatic hypotension- (present on admission)  Assessment & Plan  Likely secondary to orthostatic hypotension  Echo normal with 60% ejection fraction, improved from prior    Paroxysmal atrial fibrillation (HCC)- (present on admission)  Assessment & Plan  CHADSVASC 3 (HTN, CHF, DM).   TTE Feb 2021: EF 60%  Continue metoprolol, digoxin, and eliquis  Discontinued telemetry    Chronic heart failure with preserved ejection fraction (HCC)- (present on admission)  Assessment & Plan  Decompensated based on peripheral and pulmonary edema, mild hyponatremia  IV diuresis initiated 3/13 for pulmonary edema, held 3/14 in setting of lactic acidosis from severe sepsis  Recovered EF 60% on repeat echo.  Continue metoprolol XL  Lisinopril held per Cardiology recommendations due to hypotension    Diabetes mellitus type 2, insulin dependent (HCC)- (present on admission)  Assessment & Plan  A1c 5.2  Diabetic diet  Restarting accuchecks and SSI due to GNR sepsis  BG goal <200 with bacteremia  Continue atorvastatin    Colostomy present (HCC)- (present on admission)  Assessment & Plan  History of fall at home Nov 2020 on left side with perforated colon and splenic fluid collection/hematoma  s/p segmental colectomy, colostomy, mobilization of the splenic flexure, wound vac, and I&D Nov 2020.   Ostomy care consulted, but he was non-participatory  He is engaging in ostomy care with his bedside RNs, encouraged to be observed but do it independently  Patient must be independently able to care for his ostomy before inpatient psychiatry will accept him  Dr. Desouza planning for reversal of ostomy on Thursday    Anxiety and depression- (present on admission)  Assessment & Plan  On Legal Hold until 3/18  Psychiatry consulted  Continue paroxetine, ziprasidone, and clonazepam  Would likely benefit from CBT or DBT  3/16: Psychiatry recommending transferring to inpatient psychiatric facility once medically cleared.  However patient has to perform self ostomy care which has been challenging.  Management and  working on best safe discharge plan.    Hypothyroidism- (present on admission)  Assessment & Plan  TSH wnl Feb 2021  Continue home Levothyroxine    Acute renal failure superimposed on stage 3 chronic kidney disease (HCC)- (present on admission)  Assessment & Plan  CKD 3  Had improved with increased midodrine and diuresis, but worsened this morning in setting of holding diuresis for hypotension and lactic acidosis  500 cc NS bolus today, repeat BMP this afternoon  Developed AHRF with pulmonary edema from 1 L bolus, judicious IVF  Diuresis held, continue midodrine  3/16: Discontinue Bactrim as it will worsen kidney functions. Slowly improving.  Avoid nephrotoxins.  Renally dose all medications.  3/18: Back to baseline.    Debility- (present on admission)  Assessment & Plan  Likely due to depression  PT/OT evaluated, no further skilled needs    Essential hypertension- (present on admission)  Assessment & Plan  Resolved, has been hypotensive this admission in setting of secondary adrenal insufficiency  Continue midodrine    Lower limb amputation, great toe (HCC)- (present on admission)  Assessment &  Plan  s/p right great toe amputation 2018    Hypomagnesemia  Assessment & Plan  Mg WNL today  Repeat AM BMP/Mg    Hypokalemia  Assessment & Plan  K 3.2  Lasix held yesterday due to hypotension and lactic acidosis  Continue Kdur 20 daily  Discussed with pharmacy, no additional K today due to risk of hyperkalemia with bactrim  Mg WNL  Repeat AM BMP/Mg    Hyponatremia  Assessment & Plan  Moderate, Na 127  In setting of IV lasix, holding lasix due to severe sepsis with lactic acidosis  500 cc NS trial today, repeat afternoon BMP  If worsening will get Delmis and Uosm  Repeat AM BMP    Obstructive uropathy- (present on admission)  Assessment & Plan  Urinary retention on bladder scans by straight cath.  Zabala catheter was placed temporarily.  Remove with voiding trials per nursing protocol.  Started on Flomax.  3/19: Zabala catheter was removed.  Voiding trials successful so far.    Rash- (present on admission)  Assessment & Plan  Patient developed pruritic drug related rash   Prn Benadryl, steroids   3/18: Resolving.    Chronic venous insufficiency of lower extremity- (present on admission)  Assessment & Plan  Compression stockings    Elevated troponin- (present on admission)  Assessment & Plan  RESOLVED  Stable. Suspect due to demand ischemia. No need to trend further.    Nonischemic cardiomyopathy (HCC)- (present on admission)  Assessment & Plan  Normal coronaries, 2018.  Tachycardia mediated per cardiology  LVEF improved, based on echo 2/10/2021 LVEF 60%    Chest pain- (present on admission)  Assessment & Plan  Resolved  EKG and Troponins not consistent with ACS    Mixed hyperlipidemia- (present on admission)  Assessment & Plan  Continue atorvastatin    Obesity- (present on admission)  Assessment & Plan  Body mass index is 37.79 kg/m².  Counseled about diet and exercise       VTE prophylaxis: apixaban

## 2021-03-22 NOTE — DISCHARGE PLANNING
Anticipated Discharge Disposition:   Inpatient Psych    Action:   Discussed discharge planning needs during rounds. Per MD, pending inpatient psych acceptance. Per chart review,pt on Legal Hold, has multiple wounds, ostomy. Per PT/OT note, recommend post acute placement     Barriers to Discharge:   Legal Hold  Ostomy  Inpatient Psych acceptance and bed availability    Plan:   Hospital Care Management will continue to follow and assist with discharge planning needs.

## 2021-03-22 NOTE — CARE PLAN
Problem: Communication  Goal: The ability to communicate needs accurately and effectively will improve  Outcome: PROGRESSING AS EXPECTED     Problem: Safety  Goal: Will remain free from injury  Outcome: PROGRESSING AS EXPECTED  Pt remains on tele-sitter.  Goal: Will remain free from falls  Outcome: PROGRESSING AS EXPECTED     Problem: Venous Thromboembolism (VTW)/Deep Vein Thrombosis (DVT) Prevention:  Goal: Patient will participate in Venous Thrombosis (VTE)/Deep Vein Thrombosis (DVT)Prevention Measures  Outcome: PROGRESSING AS EXPECTED     Problem: Discharge Barriers/Planning  Goal: Patient's continuum of care needs will be met  Outcome: PROGRESSING SLOWER THAN EXPECTED  Pt was taught how to empty colostomy bag, but is unable to do himself. Continue to teach.

## 2021-03-23 LAB
ANION GAP SERPL CALC-SCNC: 8 MMOL/L (ref 7–16)
BASOPHILS # BLD AUTO: 0.7 % (ref 0–1.8)
BASOPHILS # BLD: 0.1 K/UL (ref 0–0.12)
BUN SERPL-MCNC: 25 MG/DL (ref 8–22)
CALCIUM SERPL-MCNC: 8.6 MG/DL (ref 8.5–10.5)
CHLORIDE SERPL-SCNC: 101 MMOL/L (ref 96–112)
CO2 SERPL-SCNC: 27 MMOL/L (ref 20–33)
CREAT SERPL-MCNC: 0.91 MG/DL (ref 0.5–1.4)
EOSINOPHIL # BLD AUTO: 0.7 K/UL (ref 0–0.51)
EOSINOPHIL NFR BLD: 5 % (ref 0–6.9)
ERYTHROCYTE [DISTWIDTH] IN BLOOD BY AUTOMATED COUNT: 47.5 FL (ref 35.9–50)
GLUCOSE BLD-MCNC: 110 MG/DL (ref 65–99)
GLUCOSE BLD-MCNC: 114 MG/DL (ref 65–99)
GLUCOSE BLD-MCNC: 116 MG/DL (ref 65–99)
GLUCOSE BLD-MCNC: 117 MG/DL (ref 65–99)
GLUCOSE SERPL-MCNC: 113 MG/DL (ref 65–99)
HCT VFR BLD AUTO: 43.1 % (ref 42–52)
HGB BLD-MCNC: 13.6 G/DL (ref 14–18)
IMM GRANULOCYTES # BLD AUTO: 0.13 K/UL (ref 0–0.11)
IMM GRANULOCYTES NFR BLD AUTO: 0.9 % (ref 0–0.9)
LYMPHOCYTES # BLD AUTO: 4.73 K/UL (ref 1–4.8)
LYMPHOCYTES NFR BLD: 33.5 % (ref 22–41)
MCH RBC QN AUTO: 28.2 PG (ref 27–33)
MCHC RBC AUTO-ENTMCNC: 31.6 G/DL (ref 33.7–35.3)
MCV RBC AUTO: 89.2 FL (ref 81.4–97.8)
MONOCYTES # BLD AUTO: 0.81 K/UL (ref 0–0.85)
MONOCYTES NFR BLD AUTO: 5.7 % (ref 0–13.4)
NEUTROPHILS # BLD AUTO: 7.63 K/UL (ref 1.82–7.42)
NEUTROPHILS NFR BLD: 54.2 % (ref 44–72)
NRBC # BLD AUTO: 0 K/UL
NRBC BLD-RTO: 0 /100 WBC
PLATELET # BLD AUTO: 381 K/UL (ref 164–446)
PMV BLD AUTO: 9.7 FL (ref 9–12.9)
POTASSIUM SERPL-SCNC: 4 MMOL/L (ref 3.6–5.5)
RBC # BLD AUTO: 4.83 M/UL (ref 4.7–6.1)
SODIUM SERPL-SCNC: 136 MMOL/L (ref 135–145)
WBC # BLD AUTO: 14.1 K/UL (ref 4.8–10.8)

## 2021-03-23 PROCEDURE — 700102 HCHG RX REV CODE 250 W/ 637 OVERRIDE(OP): Performed by: STUDENT IN AN ORGANIZED HEALTH CARE EDUCATION/TRAINING PROGRAM

## 2021-03-23 PROCEDURE — A9270 NON-COVERED ITEM OR SERVICE: HCPCS | Performed by: STUDENT IN AN ORGANIZED HEALTH CARE EDUCATION/TRAINING PROGRAM

## 2021-03-23 PROCEDURE — 85025 COMPLETE CBC W/AUTO DIFF WBC: CPT

## 2021-03-23 PROCEDURE — 770001 HCHG ROOM/CARE - MED/SURG/GYN PRIV*

## 2021-03-23 PROCEDURE — 700102 HCHG RX REV CODE 250 W/ 637 OVERRIDE(OP): Performed by: HOSPITALIST

## 2021-03-23 PROCEDURE — 36415 COLL VENOUS BLD VENIPUNCTURE: CPT

## 2021-03-23 PROCEDURE — 97116 GAIT TRAINING THERAPY: CPT

## 2021-03-23 PROCEDURE — A9270 NON-COVERED ITEM OR SERVICE: HCPCS | Performed by: HOSPITALIST

## 2021-03-23 PROCEDURE — 82962 GLUCOSE BLOOD TEST: CPT | Mod: 91

## 2021-03-23 PROCEDURE — 99232 SBSQ HOSP IP/OBS MODERATE 35: CPT | Performed by: STUDENT IN AN ORGANIZED HEALTH CARE EDUCATION/TRAINING PROGRAM

## 2021-03-23 PROCEDURE — 80048 BASIC METABOLIC PNL TOTAL CA: CPT

## 2021-03-23 PROCEDURE — 97530 THERAPEUTIC ACTIVITIES: CPT

## 2021-03-23 PROCEDURE — A9270 NON-COVERED ITEM OR SERVICE: HCPCS | Performed by: FAMILY MEDICINE

## 2021-03-23 PROCEDURE — 700102 HCHG RX REV CODE 250 W/ 637 OVERRIDE(OP): Performed by: FAMILY MEDICINE

## 2021-03-23 RX ADMIN — POTASSIUM CHLORIDE 20 MEQ: 20 TABLET, EXTENDED RELEASE ORAL at 06:38

## 2021-03-23 RX ADMIN — MIDODRINE HYDROCHLORIDE 5 MG: 5 TABLET ORAL at 13:07

## 2021-03-23 RX ADMIN — MIDODRINE HYDROCHLORIDE 5 MG: 5 TABLET ORAL at 17:56

## 2021-03-23 RX ADMIN — DOCUSATE SODIUM 50 MG AND SENNOSIDES 8.6 MG 2 TABLET: 8.6; 5 TABLET, FILM COATED ORAL at 20:46

## 2021-03-23 RX ADMIN — PAROXETINE HYDROCHLORIDE 20 MG: 20 TABLET, FILM COATED ORAL at 09:00

## 2021-03-23 RX ADMIN — ZIPRASIDONE HYDROCHLORIDE 60 MG: 60 CAPSULE ORAL at 09:01

## 2021-03-23 RX ADMIN — APIXABAN 5 MG: 5 TABLET, FILM COATED ORAL at 20:46

## 2021-03-23 RX ADMIN — APIXABAN 5 MG: 5 TABLET, FILM COATED ORAL at 09:00

## 2021-03-23 RX ADMIN — SIMETHICONE 80 MG: 80 TABLET, CHEWABLE ORAL at 06:39

## 2021-03-23 RX ADMIN — LEVOTHYROXINE SODIUM 112 MCG: 0.11 TABLET ORAL at 06:38

## 2021-03-23 RX ADMIN — SIMETHICONE 80 MG: 80 TABLET, CHEWABLE ORAL at 13:07

## 2021-03-23 RX ADMIN — METOPROLOL SUCCINATE 25 MG: 25 TABLET, EXTENDED RELEASE ORAL at 06:38

## 2021-03-23 RX ADMIN — OMEPRAZOLE 20 MG: 20 CAPSULE, DELAYED RELEASE ORAL at 06:38

## 2021-03-23 RX ADMIN — ZIPRASIDONE HYDROCHLORIDE 60 MG: 60 CAPSULE ORAL at 21:00

## 2021-03-23 RX ADMIN — SIMETHICONE 80 MG: 80 TABLET, CHEWABLE ORAL at 17:56

## 2021-03-23 RX ADMIN — TAMSULOSIN HYDROCHLORIDE 0.4 MG: 0.4 CAPSULE ORAL at 06:38

## 2021-03-23 RX ADMIN — DOCUSATE SODIUM 50 MG AND SENNOSIDES 8.6 MG 2 TABLET: 8.6; 5 TABLET, FILM COATED ORAL at 09:00

## 2021-03-23 RX ADMIN — CLONAZEPAM 1 MG: 1 TABLET ORAL at 17:56

## 2021-03-23 RX ADMIN — FLUDROCORTISONE ACETATE 0.1 MG: 0.1 TABLET ORAL at 06:39

## 2021-03-23 RX ADMIN — DIGOXIN 125 MCG: 125 TABLET ORAL at 17:56

## 2021-03-23 RX ADMIN — INSULIN GLARGINE 5 UNITS: 100 INJECTION, SOLUTION SUBCUTANEOUS at 09:06

## 2021-03-23 RX ADMIN — CLONAZEPAM 0.5 MG: 0.5 TABLET ORAL at 09:00

## 2021-03-23 RX ADMIN — SULFAMETHOXAZOLE AND TRIMETHOPRIM 1 TABLET: 800; 160 TABLET ORAL at 06:39

## 2021-03-23 RX ADMIN — ATORVASTATIN CALCIUM 40 MG: 40 TABLET, FILM COATED ORAL at 17:56

## 2021-03-23 RX ADMIN — MIDODRINE HYDROCHLORIDE 5 MG: 5 TABLET ORAL at 09:00

## 2021-03-23 ASSESSMENT — COGNITIVE AND FUNCTIONAL STATUS - GENERAL
STANDING UP FROM CHAIR USING ARMS: A LITTLE
MOVING TO AND FROM BED TO CHAIR: UNABLE
WALKING IN HOSPITAL ROOM: A LITTLE
CLIMB 3 TO 5 STEPS WITH RAILING: A LOT
SUGGESTED CMS G CODE MODIFIER MOBILITY: CL
TURNING FROM BACK TO SIDE WHILE IN FLAT BAD: A LITTLE
MOVING FROM LYING ON BACK TO SITTING ON SIDE OF FLAT BED: UNABLE
MOBILITY SCORE: 13

## 2021-03-23 ASSESSMENT — GAIT ASSESSMENTS
ASSISTIVE DEVICE: 4 WHEEL WALKER
DISTANCE (FEET): 30
GAIT LEVEL OF ASSIST: MINIMAL ASSIST
DEVIATION: BRADYKINETIC;SHUFFLED GAIT

## 2021-03-23 ASSESSMENT — ENCOUNTER SYMPTOMS
ABDOMINAL PAIN: 0
VOMITING: 0
HEADACHES: 0
EYES NEGATIVE: 1
FOCAL WEAKNESS: 0
DEPRESSION: 1
NAUSEA: 0
MYALGIAS: 0
COUGH: 0
SHORTNESS OF BREATH: 1
FEVER: 0
CHILLS: 0

## 2021-03-23 NOTE — PROGRESS NOTES
With patient’s permission (if able), completed daily phone call to designated support person, nita bowden  Discussed patient condition and plan of care. All questions answered.  Follow up requested: Wanted to be called after patient has surgery

## 2021-03-23 NOTE — CARE PLAN
Care Plan  Problem: Communication  Goal: The ability to communicate needs accurately and effectively will improve  Outcome: PROGRESSING AS EXPECTED     Problem: Safety  Goal: Will remain free from injury  Outcome: PROGRESSING AS EXPECTED  Goal: Will remain free from falls  Outcome: PROGRESSING AS EXPECTED     Problem: Infection  Goal: Will remain free from infection  Outcome: PROGRESSING AS EXPECTED     Problem: Venous Thromboembolism (VTW)/Deep Vein Thrombosis (DVT) Prevention:  Goal: Patient will participate in Venous Thrombosis (VTE)/Deep Vein Thrombosis (DVT)Prevention Measures  Outcome: PROGRESSING AS EXPECTED     Problem: Bowel/Gastric:  Goal: Normal bowel function is maintained or improved  Outcome: PROGRESSING AS EXPECTED  Goal: Will not experience complications related to bowel motility  Outcome: PROGRESSING AS EXPECTED     Problem: Knowledge Deficit  Goal: Knowledge of disease process/condition, treatment plan, diagnostic tests, and medications will improve  Outcome: PROGRESSING AS EXPECTED  Goal: Knowledge of the prescribed therapeutic regimen will improve  Outcome: PROGRESSING AS EXPECTED     Problem: Discharge Barriers/Planning  Goal: Patient's continuum of care needs will be met  Outcome: PROGRESSING AS EXPECTED     Problem: Fluid Volume:  Goal: Will maintain balanced intake and output  Outcome: PROGRESSING AS EXPECTED     Problem: Psychosocial Needs:  Goal: Level of anxiety will decrease  Outcome: PROGRESSING AS EXPECTED     Problem: Mobility  Goal: Risk for activity intolerance will decrease  Outcome: PROGRESSING AS EXPECTED

## 2021-03-23 NOTE — CARE PLAN
Problem: Communication  Goal: The ability to communicate needs accurately and effectively will improve  Outcome: PROGRESSING AS EXPECTED  Note: Pt has been educated on importance for appropriately calling for assistance or needs with call light, any questions/concerns answered at this time. Call light close by, will check on patient hourly.       Problem: Safety  Goal: Will remain free from injury  Outcome: PROGRESSING AS EXPECTED  Note: Discussed patient mobility status with interdisciplinary team, fall precautions in place, pt fall prevention education done, pt educated to call for assistance when needed. Telesitter in place  Goal: Will remain free from falls  Outcome: PROGRESSING AS EXPECTED     Problem: Venous Thromboembolism (VTW)/Deep Vein Thrombosis (DVT) Prevention:  Goal: Patient will participate in Venous Thrombosis (VTE)/Deep Vein Thrombosis (DVT)Prevention Measures  Outcome: PROGRESSING AS EXPECTED  Note: Pt educated on VTE prevention, pharmacological and physical VTE prophylaxis in place. Pt turns in bed with assistance, discussed getting OOB before lunch.       Problem: Bowel/Gastric:  Goal: Normal bowel function is maintained or improved  Outcome: PROGRESSING AS EXPECTED  Goal: Will not experience complications related to bowel motility  Outcome: PROGRESSING AS EXPECTED

## 2021-03-23 NOTE — DISCHARGE PLANNING
Legal Hold    Referral: Legal Hold Court     Intervention:  advised pt will meet with court MD's via telemedicine monitor to contest the legal hold.      Plan: Pt will present to telemedicine mental health to meet with court physicians 3/24. Will call bedside RN once time has been determined.

## 2021-03-23 NOTE — PROGRESS NOTES
Hospital Medicine Daily Progress Note    Date of Service  3/23/2021    Chief Complaint  56 y.o. male admitted 2/9/2021 with syncope    Hospital Course  This is a 56 year-old man with a past medical history significant for atrial fibrillation, secondary hypercoagulable state. Diabetes mellitus with glyco of 5.2 complicated with diabetic neuropathy, history of congestive systolic heart failure, dyslipidemia presented to the ER on 2/9/2021 after he had a syncopal episode.    Patient does have a history of orthostatic hypotension and was started on fludrocortisone.  For the event, patient complained of significant dizziness.  Upon presentation in ER, patient was hypotensive along with bradycardia thus cardiology was consulted and evaluated and started to stopping all AV sanket blocking agent.  During the stay in the hospital, patient orthostatic vital signs continue to remain positive-was a started on midodrine 5 mg every 6 hours    Evaluated in the past by EP and neurology recurrent syncopal episode.  Currently patient is on digoxin 125 cc p.o. daily, along with Eliquis 5 mg p.o. twice daily for atrial fibrillation.    Echocardiogram was obtained which showed EF of 60%, improved from previous echocardiogram obtained on 11/2020.    Patient has h/o spleen injury, splenic flexure perforation in September 2020. S/p colostomy and Interiano's pouch in November 2020. He was at home taking care of his colostomy, however he has become depressed, overwhelmed and can no longer take care of his colostomy independently.    Psych continue to follow the patient, psychiatric medication as per psych recommendations.    Developed hypersensitivity rash and itching due to zosyn.   Switched to ciprofloxacin and treated with benadryl.  BCx resulted as pan-susceptible Klebsiella pneumo.   Discussed with pharmacist Ashley and decided on bactrim.     Interval Problem Update  I evaluated and examined the patient at the bedside.  Labs and imaging  were reviewed. Care plan was discussed with the patient and bedside nurse.    Pt still reported depression, suicidal ideation, hallucination.    ostomy bag will be replaced on Thurs  Tele-sitter in place.    Consultants/Specialty  Psychiatry- SI  Cardiology - Hypotension and Afib    Code Status  Full Code    Disposition  To inpatient psychiatry pending improvement in sepsis, hypotension, and hypoxic respiratory failure.  Must be independently managing ostomy prior to transfer to psych facility.    Review of Systems  Review of Systems   Constitutional: Positive for malaise/fatigue. Negative for chills and fever.   HENT: Negative.    Eyes: Negative.    Respiratory: Positive for shortness of breath. Negative for cough.    Cardiovascular: Negative for chest pain.   Gastrointestinal: Negative for abdominal pain, nausea and vomiting.   Genitourinary: Negative for dysuria.   Musculoskeletal: Negative for myalgias.   Skin: Negative for rash.   Neurological: Negative for focal weakness and headaches.   Psychiatric/Behavioral: Positive for depression and suicidal ideas.        Physical Exam  Temp:  [35.8 °C (96.5 °F)-36.6 °C (97.9 °F)] 35.8 °C (96.5 °F)  Pulse:  [60-63] 60  Resp:  [18-20] 20  BP: (108-148)/(58-76) 148/76  SpO2:  [93 %-96 %] 96 %    Physical Exam  Vitals and nursing note reviewed.   Constitutional:       Appearance: He is ill-appearing.   HENT:      Head: Normocephalic.      Mouth/Throat:      Mouth: Mucous membranes are moist.      Pharynx: Oropharynx is clear.   Eyes:      Pupils: Pupils are equal, round, and reactive to light.   Cardiovascular:      Rate and Rhythm: Normal rate and regular rhythm.      Heart sounds: Normal heart sounds.   Pulmonary:      Effort: Pulmonary effort is normal. No respiratory distress.      Breath sounds: Normal breath sounds. No wheezing.   Abdominal:      General: Abdomen is flat. Bowel sounds are normal.      Comments: Colostoma in place in left abdomen   Musculoskeletal:          General: Normal range of motion.      Cervical back: Normal range of motion.   Skin:     General: Skin is warm.   Neurological:      General: No focal deficit present.      Mental Status: He is alert and oriented to person, place, and time.   Psychiatric:         Mood and Affect: Mood is depressed.         Thought Content: Thought content includes suicidal ideation.         Fluids    Intake/Output Summary (Last 24 hours) at 3/23/2021 1454  Last data filed at 3/23/2021 0900  Gross per 24 hour   Intake 477 ml   Output 250 ml   Net 227 ml       Laboratory  Recent Labs     03/21/21  1102 03/22/21  0550 03/23/21  0533   WBC 14.2* 13.6* 14.1*   RBC 4.97 4.86 4.83   HEMOGLOBIN 14.3 13.9* 13.6*   HEMATOCRIT 44.2 43.4 43.1   MCV 88.9 89.3 89.2   MCH 28.8 28.6 28.2   MCHC 32.4* 32.0* 31.6*   RDW 46.6 47.2 47.5   PLATELETCT 468* 420 381   MPV 9.5 9.6 9.7     Recent Labs     03/21/21  1102 03/22/21  0550 03/23/21  0533   SODIUM 140 140 136   POTASSIUM 3.6 4.2 4.0   CHLORIDE 102 104 101   CO2 30 30 27   GLUCOSE 145* 122* 113*   BUN 26* 29* 25*   CREATININE 0.84 1.00 0.91   CALCIUM 9.3 9.1 8.6                   Imaging  [id number] VIEW   Final Result         No significant interval change.      DX-CHEST-LIMITED (1 VIEW)   Final Result         Diffuse interstitial prominence could relate to mild pulmonary edema or atypical infection      [id number] VIEW   Final Result      Increased colonic gas without definite bowel obstruction.      US-RENAL   Final Result      No evidence of hydronephrosis.      Lobulated kidneys bilaterally.      CT-ABDOMEN-PELVIS WITH   Final Result      1.  There is no evidence of small bowel obstruction.   2.  There is a left lower quadrant ostomy.   3.  There is fluid distention of the colon distal to the surgical material in the left mid descending colon extending all the way to the rectum. There is no pneumatosis or free air.   4.  There is cholelithiasis without biliary dilatation.   5.  There  has been interval removal of the drainage catheter anterior to the spleen with minimal hypodense area in the anterior spleen again noted. There is no new fluid collection.      IR-US GUIDED PIV   Final Result    Ultrasound-guided PERIPHERAL IV INSERTION performed by    qualified nursing staff as above.      EC-ECHOCARDIOGRAM COMPLETE W/O CONT   Final Result      DX-LUMBAR SPINE-2 OR 3 VIEWS   Final Result      Moderate compression deformity of T11 is new compared to 2018.      Mild wedge deformity of T12 is unchanged.      Degenerative changes including facet arthropathy.      Mild retrolisthesis of L5 on S1 and L3 on L4.              Assessment/Plan  * Suicidal ideation- (present on admission)  Assessment & Plan  Has history of MDD, recurrent with psychotic features with active SI w/ plan.  Psychiatry consulted, continued legal hold  Legal hold until 3/18  To be discharged to psychiatric program pending medical clearance and when able to self-care for ostomy    Cardiogenic pulmonary edema (HCC)  Assessment & Plan  CXR demonstrates pulmonary vascular congestion / edema  NT-BNP 7828  Due to IV boluses for hypotension and N/V in setting of HFrEF with recovered EF  IV lasix tolerated with improvement in BP and Cr, discontinued 3/14 for severe sepsis and lactic acidosis  3/16: Currently saturating well on room air.    Major depressive disorder, recurrent, severe with psychotic features (HCC)- (present on admission)  Assessment & Plan  Psychiatry and Psychology following  Continue paroxetine, ziprasidone, and clonazepam    Secondary adrenal insufficiency (HCC)- (present on admission)  Assessment & Plan  Cosyntropin stimulation test reviewed with only a increase in his cortisol going from 14.5 then up to 21.3 after cosyntropin given  Renin 0.1, ACTH 3.4 and slight increase with cosyntropin suggesting secondary AI  Random cortisol 9.9  Needs outpatient endocrinology follow up.  Repeat adrenal function after  discharge  Repeat AM BMP    Janna restarted 3/14 at 0.2 mg daily (2x dose) due to severe sepsis from GNR bacteremia in order to prevent adrenal crisis    Sepsis with acute renal failure without septic shock (HCC)  Assessment & Plan  This is Severe Sepsis Not present on admission  SIRS criteria identified on my evaluation include: Tachycardia, with heart rate greater than 90 BPM and Leukocytosis, with WBC greater than 12,000  Source of infection is GNR bacteremia  Clinical indicators of end organ dysfunction include Systolic blood pressure (SBP) <90 mmHg or mean arterial pressure <65 mmHg and Lactate >2 mmol/L (18.0 mg/dL)  Sepsis protocol initiated  Fluid resuscitation not ordered due to pulmonary edema seen on CXR due to HFrEF  IV antibiotics as appropriate for source of sepsis - Zosyn    BCx x2 (3/13) +GNR in one bottle, awaiting speciation and susceptbilities  UCx added to UA (3/13) that showed pyuria and bacteruria  Cdiff negative (3/12)  Prior microbiology date reviewed, h/o enterococcus and MRSA but currently not positive for GPCs    Reassessment: I have reassessed the patient's hemodynamic status  End organ dysfunction include(s):  Acute kidney failure (SRUTHI)  Cr and BP improved after diuresis yesterday for cardiogenic pulmonary edema  Lactic Acid 2.8, improved with holding diuresis    Orthostatic hypotension- (present on admission)  Assessment & Plan  Multifactorial including adrenal insufficiency, medications (beta blocker, CCB, tamsulosin), diabetic autonomic dysfunction, and venous insufficiency  EF is noted to be 60%, significant improvement from prior.  Per cardiology, no further cardiac work-up needed  Follow-up with cardiology as an outpatient.  Continue midodrine, PRN IVF    Bacteremia  Assessment & Plan  Due to klebsiella bacteremia  BCx: 1 of 2 bottles +Klebsiella  Cdiff negative  UA: Bacteruria and pyuria  UCx: In progress  CXR with pulmonary edema but no infiltrates  3/16: Currently on  Ancef.  3/19: Switch him to Bactrim to complete a 10-day course.    Abdominal bloating  Assessment & Plan  Improved  Had increasing gas output from ostomy  Intermittent episodes of abdominal pain and N/V  CT 3/4 did not demonstrate obstruction nor pneumotosis  KUB 3/12 for recurrent symptoms demonstrated increased gas but no signs of SBO  Simethicone scheduled 80 mg TID  If uncontrolled, would confer with general surgeon about further imaging and discuss empiric treatment for SIBO    Normal anion gap metabolic acidosis  Assessment & Plan  Resolved  Due to bicarbonate loss via ostomy  Repeat AM BMP    Syncope due to orthostatic hypotension- (present on admission)  Assessment & Plan  Likely secondary to orthostatic hypotension  Echo normal with 60% ejection fraction, improved from prior    Paroxysmal atrial fibrillation (HCC)- (present on admission)  Assessment & Plan  CHADSVASC 3 (HTN, CHF, DM).   TTE Feb 2021: EF 60%  Continue metoprolol, digoxin, and eliquis  Discontinued telemetry    Chronic heart failure with preserved ejection fraction (HCC)- (present on admission)  Assessment & Plan  Decompensated based on peripheral and pulmonary edema, mild hyponatremia  IV diuresis initiated 3/13 for pulmonary edema, held 3/14 in setting of lactic acidosis from severe sepsis  Recovered EF 60% on repeat echo.  Continue metoprolol XL  Lisinopril held per Cardiology recommendations due to hypotension    Diabetes mellitus type 2, insulin dependent (HCC)- (present on admission)  Assessment & Plan  A1c 5.2  Diabetic diet  Restarting accuchecks and SSI due to GNR sepsis  BG goal <200 with bacteremia  Continue atorvastatin    Colostomy present (HCC)- (present on admission)  Assessment & Plan  History of fall at home Nov 2020 on left side with perforated colon and splenic fluid collection/hematoma s/p segmental colectomy, colostomy, mobilization of the splenic flexure, wound vac, and I&D Nov 2020.   Ostomy care consulted, but he was  non-participatory  He is engaging in ostomy care with his bedside RNs, encouraged to be observed but do it independently  Patient must be independently able to care for his ostomy before inpatient psychiatry will accept him  Dr. Desouza planning for reversal of ostomy on Thursday    Anxiety and depression- (present on admission)  Assessment & Plan  On Legal Hold until 3/18  Psychiatry consulted  Continue paroxetine, ziprasidone, and clonazepam  Would likely benefit from CBT or DBT  3/16: Psychiatry recommending transferring to inpatient psychiatric facility once medically cleared.  However patient has to perform self ostomy care which has been challenging.  Management and  working on best safe discharge plan.    Hypothyroidism- (present on admission)  Assessment & Plan  TSH wnl Feb 2021  Continue home Levothyroxine    Acute renal failure superimposed on stage 3 chronic kidney disease (HCC)- (present on admission)  Assessment & Plan  CKD 3  Had improved with increased midodrine and diuresis, but worsened this morning in setting of holding diuresis for hypotension and lactic acidosis  500 cc NS bolus today, repeat BMP this afternoon  Developed AHRF with pulmonary edema from 1 L bolus, judicious IVF  Diuresis held, continue midodrine  3/16: Discontinue Bactrim as it will worsen kidney functions. Slowly improving.  Avoid nephrotoxins.  Renally dose all medications.  3/18: Back to baseline.    Debility- (present on admission)  Assessment & Plan  Likely due to depression  PT/OT evaluated, no further skilled needs    Essential hypertension- (present on admission)  Assessment & Plan  Resolved, has been hypotensive this admission in setting of secondary adrenal insufficiency  Continue midodrine    Lower limb amputation, great toe (HCC)- (present on admission)  Assessment & Plan  s/p right great toe amputation 2018    Hypomagnesemia  Assessment & Plan  Mg WNL today  Repeat AM BMP/Mg    Hypokalemia  Assessment &  Plan  K 3.2  Lasix held yesterday due to hypotension and lactic acidosis  Continue Kdur 20 daily  Discussed with pharmacy, no additional K today due to risk of hyperkalemia with bactrim  Mg WNL  Repeat AM BMP/Mg    Hyponatremia  Assessment & Plan  Moderate, Na 127  In setting of IV lasix, holding lasix due to severe sepsis with lactic acidosis  500 cc NS trial today, repeat afternoon BMP  If worsening will get Delmis and Uosm  Repeat AM BMP    Obstructive uropathy- (present on admission)  Assessment & Plan  Urinary retention on bladder scans by straight cath.  Zabala catheter was placed temporarily.  Remove with voiding trials per nursing protocol.  Started on Flomax.  3/19: Zabala catheter was removed.  Voiding trials successful so far.    Rash- (present on admission)  Assessment & Plan  Patient developed pruritic drug related rash   Prn Benadryl, steroids   3/18: Resolving.    Chronic venous insufficiency of lower extremity- (present on admission)  Assessment & Plan  Compression stockings    Elevated troponin- (present on admission)  Assessment & Plan  RESOLVED  Stable. Suspect due to demand ischemia. No need to trend further.    Nonischemic cardiomyopathy (HCC)- (present on admission)  Assessment & Plan  Normal coronaries, 2018.  Tachycardia mediated per cardiology  LVEF improved, based on echo 2/10/2021 LVEF 60%    Chest pain- (present on admission)  Assessment & Plan  Resolved  EKG and Troponins not consistent with ACS    Mixed hyperlipidemia- (present on admission)  Assessment & Plan  Continue atorvastatin    Obesity- (present on admission)  Assessment & Plan  Body mass index is 37.79 kg/m².  Counseled about diet and exercise       VTE prophylaxis: apixaban

## 2021-03-23 NOTE — PROGRESS NOTES
With patient’s permission (if able), attempted to contact designated support person, name Jean BUENO And left voicemail with number to call back for questions.

## 2021-03-23 NOTE — WOUND TEAM
[facility]  Inpatient Services  Wound and Skin Care Progress Note    Admission Date: 2/9/2021     Last order of IP CONSULT TO WOUND CARE was found on 3/18/2021 from Hospital Encounter on 2/9/2021     HPI, PMH, SH: Reviewed    Past Surgical History:   Procedure Laterality Date   • COLECTOMY N/A 11/10/2020    Procedure: COLECTOMY-SEGMENTAL, COLOSTOMY, MOBILIZATION OF SPLENIC FLEXURE, WOUND VAC PLACEMENT, IRRIGATION AND DEBRIDMENT FLANK WOUND;  Surgeon: Kin Desouza D.O.;  Location: [facility];  Service: General   • ZZZ CARDIAC CATH  07/21/2018    EF 45%, normal coronaries.   • IRRIGATION & DEBRIDEMENT ORTHO Right 2/19/2018    Procedure: IRRIGATION & DEBRIDEMENT ORTHO-FOOT;  Surgeon: Kirby Lopez M.D.;  Location: [facility];  Service: Orthopedics   • TOE AMPUTATION Right 1/17/2018    Procedure: TOE AMPUTATION-1ST RAY;  Surgeon: Doug Delong M.D.;  Location: [facility];  Service: Orthopedics   • TOE AMPUTATION Right 11/10/2017    Procedure: TOE AMPUTATION;  Surgeon: Osorio Leo M.D.;  Location: [facility];  Service: Orthopedics     Social History     Tobacco Use   • Smoking status: Never Smoker   • Smokeless tobacco: Never Used   Substance Use Topics   • Alcohol use: No     Chief Complaint   Patient presents with   • Syncope     X1 this morning when standing up to go to bathroom     Diagnosis: Syncope and collapse [R55]  Suicidal ideation [R45.851]  Sepsis due to Klebsiella pneumoniae (HCC) [A41.4]    Unit where seen by Wound Team: S148/00     WOUND CONSULT/FOLLOW UP RELATED TO:  Sacrum & R posterior thigh follow up    WOUND HISTORY:  Pt is an older gentleman well known to [facility] wound team 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 Pt currently admitted since 2/9/21 related to SI concerns due to body image issues related to colostomy. Wound team was consulted regarding sacrum and Right posterior thigh wounds.     WOUND  ASSESSMENT/LDA     Wound 03/17/21 Pressure Injury Thigh Posterior Right 3/18-DTI (Active)   Wound Image    03/23/21 1500   Site Assessment Purple;Clean;Dry;Intact    Periwound Assessment Clean;Dry;Intact    Margins Attached edges;Defined edges    Closure Adhesive bandage    Drainage Amount None    Treatments Cleansed;Site care;Offloading    Wound Cleansing Approved Wound Cleanser    Periwound Protectant Skin Protectant Wipes to Periwound    Dressing Cleansing/Solutions Not Applicable    Dressing Options Mepitel One;Mepilex    Dressing Changed New    Dressing Status Clean;Dry;Intact    Dressing Change/Treatment Frequency Every 72 hrs, and As Needed    NEXT Dressing Change/Treatment Date 03/26/21    NEXT Weekly Photo (Inpatient Only) 03/30/21    Pressure Injury Stage DTPI    Wound Length (cm) 12.5 cm    Wound Width (cm) 1 cm    Wound Surface Area (cm^2) 12.5 cm^2    Tunneling (cm) 0 cm    Shape Linear x3    Wound Odor None    Exposed Structures None      Vascular:    SHAYAN:   No results found.    Lab Values:    Lab Results   Component Value Date/Time    WBC 14.1 (H) 03/23/2021 05:33 AM    RBC 4.83 03/23/2021 05:33 AM    HEMOGLOBIN 13.6 (L) 03/23/2021 05:33 AM    HEMATOCRIT 43.1 03/23/2021 05:33 AM    CREACTPROT 7.52 (H) 11/29/2020 01:25 AM    SEDRATEWES 13 10/23/2018 08:02 AM    HBA1C 5.2 02/14/2021 01:30 AM        Culture Results show:  No results found for this or any previous visit (from the past 720 hour(s)).    Pain Level/Medicated:  Denies       INTERVENTIONS BY WOUND TEAM:  Chart and images reviewed. Discussed with bedside RN. All areas of concern (based on picture review, LDA review and discussion with bedside RN) have been thoroughly assessed. Documentation of areas based on significant findings. This RN in to assess patient. Performed standard wound care which includes appropriate positioning, dressing removal and non-selective debridement. Pictures and measurements obtained weekly if/when required.        RIGHT POSTERIOR THIGH: Evolving to stage 2  Preparation for Dressing removal: NA  Cleansed with:  NA  Sharp debridement: NA  Joya wound: NA  Primary Dressing: Mepitel one was applied over wound and was then secured in place with a sacral mepilex.          SACRUM:  Preparation for Dressing removal: Sacral mepilex removed with no difficulty  Cleansed with:  NA  Sharp debridement: NA  Joya wound: NA  Primary Dressing: Sacral mepilex reapplied.     Interdisciplinary consultation: Patient, Bedside RN,     EVALUATION / RATIONALE FOR TREATMENT:  Most Recent Date:  3/23/21: Right posterior thigh wound remains a DTI but appears to be evolving into a stage 2. Wound team was previously consulted regarding sacral wound which appeared to be a skin tear however skin now appears to be intact however discolored. Does not appear to be pressure related as pt is on a waffle, self mobile and sacral mepilex is in use. Area is also not a normal pressure injury shape. Wound team will continue to follow.     3/18: Pt with linear DTIs to R posterior thigh from lying on top of frye cath tubing. Offloading precautions ordered. Wound team to continue following.     Goals: Steady decrease in wound area and depth weekly.    WOUND TEAM PLAN OF CARE ([X] for frequency of wound follow up,):   Nursing to follow orders written for wound care. Contact wound team if area fails to progress, deteriorates or with any questions/concerns  Dressing changes by wound team:                   Follow up 3 times weekly:                NPWT change 3 times weekly:     Follow up 1-2 times weekly: X nursing to perform skin care; WT following      Follow up Bi-Monthly:                   Follow up as needed:     Other (explain):     NURSING PLAN OF CARE ORDERS (X):  Dressing changes: See Dressing Care orders:   Skin care: See Skin Care orders: x  RN Prevention Protocol: x  Rectal tube care: See Rectal Tube Care orders:   Other orders:    RSKIN:   CURRENTLY IN PLACE  (X), APPLIED THIS VISIT (A), ORDERED (O):   Q shift Michael:  X  Q shift pressure point assessments:  X    Surface/Positioning   Pressure redistribution mattress            Low Airloss        X icu bed  Bariatric foam      Bariatric DARYL     Waffle cushion        Waffle Overlay        X  Reposition q 2 hours    O  TAPs Turning system   O  Z Teja Pillow     Offloading/Redistribution   Sacral Mepilex (Silicone dressing)   X  Heel Mepilex (Silicone dressing)         Heel float boots (Prevalon boot)             Float Heels off Bed with Pillows           Respiratory NA  Silicone O2 tubing         Gray Foam Ear protectors     Cannula fixation Device (Tender )          High flow offloading Clip    Elastic head band offloading device      Anchorfast                                                         Trach with Optifoam split foam             Containment/Moisture Prevention     Rectal tube or BMS    Purwick/Condom Cath        Zabala Catheter    Barrier wipes   X      Barrier paste       Antifungal tx      Interdry        Mobilization       Up to chair        Ambulate   X  PT/OT      Nutrition       Dietician        Diabetes Education      PO  X   TF     TPN     NPO   # days     Other        Anticipated discharge plans: TBD- pending psych transfer, may need continued offloading  LTACH:        SNF/Rehab:                  Home Health Care:           Outpatient Wound Center:            Self/Family Care:        Other:

## 2021-03-23 NOTE — WOUND TEAM
Spoke with Dr. Desouza and updated on patients situation with ostomy care and psych issues regarding stoma.  Dr. Desouza agreed to tentatively put patient on the surgery schedule for ostomy reversal on Thursday.

## 2021-03-24 LAB
ALBUMIN SERPL BCP-MCNC: 2.7 G/DL (ref 3.2–4.9)
ALBUMIN/GLOB SERPL: 0.9 G/DL
ALP SERPL-CCNC: 97 U/L (ref 30–99)
ALT SERPL-CCNC: 32 U/L (ref 2–50)
ANION GAP SERPL CALC-SCNC: 5 MMOL/L (ref 7–16)
AST SERPL-CCNC: 24 U/L (ref 12–45)
BASOPHILS # BLD AUTO: 0.7 % (ref 0–1.8)
BASOPHILS # BLD: 0.09 K/UL (ref 0–0.12)
BILIRUB SERPL-MCNC: 0.4 MG/DL (ref 0.1–1.5)
BUN SERPL-MCNC: 20 MG/DL (ref 8–22)
CALCIUM SERPL-MCNC: 8.5 MG/DL (ref 8.5–10.5)
CHLORIDE SERPL-SCNC: 100 MMOL/L (ref 96–112)
CO2 SERPL-SCNC: 28 MMOL/L (ref 20–33)
CREAT SERPL-MCNC: 0.83 MG/DL (ref 0.5–1.4)
EOSINOPHIL # BLD AUTO: 0.88 K/UL (ref 0–0.51)
EOSINOPHIL NFR BLD: 6.9 % (ref 0–6.9)
ERYTHROCYTE [DISTWIDTH] IN BLOOD BY AUTOMATED COUNT: 47.4 FL (ref 35.9–50)
GLOBULIN SER CALC-MCNC: 3 G/DL (ref 1.9–3.5)
GLUCOSE BLD-MCNC: 104 MG/DL (ref 65–99)
GLUCOSE BLD-MCNC: 121 MG/DL (ref 65–99)
GLUCOSE BLD-MCNC: 125 MG/DL (ref 65–99)
GLUCOSE BLD-MCNC: 152 MG/DL (ref 65–99)
GLUCOSE SERPL-MCNC: 116 MG/DL (ref 65–99)
HCT VFR BLD AUTO: 42 % (ref 42–52)
HGB BLD-MCNC: 13.6 G/DL (ref 14–18)
IMM GRANULOCYTES # BLD AUTO: 0.12 K/UL (ref 0–0.11)
IMM GRANULOCYTES NFR BLD AUTO: 0.9 % (ref 0–0.9)
LYMPHOCYTES # BLD AUTO: 4.21 K/UL (ref 1–4.8)
LYMPHOCYTES NFR BLD: 33.1 % (ref 22–41)
MCH RBC QN AUTO: 28.6 PG (ref 27–33)
MCHC RBC AUTO-ENTMCNC: 32.4 G/DL (ref 33.7–35.3)
MCV RBC AUTO: 88.4 FL (ref 81.4–97.8)
MONOCYTES # BLD AUTO: 0.69 K/UL (ref 0–0.85)
MONOCYTES NFR BLD AUTO: 5.4 % (ref 0–13.4)
NEUTROPHILS # BLD AUTO: 6.71 K/UL (ref 1.82–7.42)
NEUTROPHILS NFR BLD: 53 % (ref 44–72)
NRBC # BLD AUTO: 0 K/UL
NRBC BLD-RTO: 0 /100 WBC
PLATELET # BLD AUTO: 331 K/UL (ref 164–446)
PMV BLD AUTO: 9.6 FL (ref 9–12.9)
POTASSIUM SERPL-SCNC: 4.2 MMOL/L (ref 3.6–5.5)
PROT SERPL-MCNC: 5.7 G/DL (ref 6–8.2)
RBC # BLD AUTO: 4.75 M/UL (ref 4.7–6.1)
SODIUM SERPL-SCNC: 133 MMOL/L (ref 135–145)
WBC # BLD AUTO: 12.7 K/UL (ref 4.8–10.8)

## 2021-03-24 PROCEDURE — A9270 NON-COVERED ITEM OR SERVICE: HCPCS | Performed by: STUDENT IN AN ORGANIZED HEALTH CARE EDUCATION/TRAINING PROGRAM

## 2021-03-24 PROCEDURE — 85025 COMPLETE CBC W/AUTO DIFF WBC: CPT

## 2021-03-24 PROCEDURE — A9270 NON-COVERED ITEM OR SERVICE: HCPCS | Performed by: HOSPITALIST

## 2021-03-24 PROCEDURE — 82962 GLUCOSE BLOOD TEST: CPT | Mod: 91

## 2021-03-24 PROCEDURE — 700102 HCHG RX REV CODE 250 W/ 637 OVERRIDE(OP): Performed by: STUDENT IN AN ORGANIZED HEALTH CARE EDUCATION/TRAINING PROGRAM

## 2021-03-24 PROCEDURE — 700102 HCHG RX REV CODE 250 W/ 637 OVERRIDE(OP): Performed by: HOSPITALIST

## 2021-03-24 PROCEDURE — 80053 COMPREHEN METABOLIC PANEL: CPT

## 2021-03-24 PROCEDURE — A9270 NON-COVERED ITEM OR SERVICE: HCPCS | Performed by: FAMILY MEDICINE

## 2021-03-24 PROCEDURE — 99232 SBSQ HOSP IP/OBS MODERATE 35: CPT | Performed by: STUDENT IN AN ORGANIZED HEALTH CARE EDUCATION/TRAINING PROGRAM

## 2021-03-24 PROCEDURE — 97535 SELF CARE MNGMENT TRAINING: CPT

## 2021-03-24 PROCEDURE — 700102 HCHG RX REV CODE 250 W/ 637 OVERRIDE(OP): Performed by: FAMILY MEDICINE

## 2021-03-24 PROCEDURE — 36415 COLL VENOUS BLD VENIPUNCTURE: CPT

## 2021-03-24 PROCEDURE — 770001 HCHG ROOM/CARE - MED/SURG/GYN PRIV*

## 2021-03-24 RX ADMIN — CLONAZEPAM 1 MG: 1 TABLET ORAL at 17:59

## 2021-03-24 RX ADMIN — DOCUSATE SODIUM 50 MG AND SENNOSIDES 8.6 MG 2 TABLET: 8.6; 5 TABLET, FILM COATED ORAL at 09:28

## 2021-03-24 RX ADMIN — DIGOXIN 125 MCG: 125 TABLET ORAL at 18:00

## 2021-03-24 RX ADMIN — ATORVASTATIN CALCIUM 40 MG: 40 TABLET, FILM COATED ORAL at 17:59

## 2021-03-24 RX ADMIN — ZIPRASIDONE HYDROCHLORIDE 60 MG: 60 CAPSULE ORAL at 20:59

## 2021-03-24 RX ADMIN — INSULIN GLARGINE 5 UNITS: 100 INJECTION, SOLUTION SUBCUTANEOUS at 05:52

## 2021-03-24 RX ADMIN — FLUDROCORTISONE ACETATE 0.1 MG: 0.1 TABLET ORAL at 05:52

## 2021-03-24 RX ADMIN — CLONAZEPAM 0.5 MG: 0.5 TABLET ORAL at 09:28

## 2021-03-24 RX ADMIN — TAMSULOSIN HYDROCHLORIDE 0.4 MG: 0.4 CAPSULE ORAL at 05:52

## 2021-03-24 RX ADMIN — SIMETHICONE 80 MG: 80 TABLET, CHEWABLE ORAL at 15:04

## 2021-03-24 RX ADMIN — INSULIN HUMAN 1 UNITS: 100 INJECTION, SOLUTION PARENTERAL at 17:59

## 2021-03-24 RX ADMIN — POTASSIUM CHLORIDE 20 MEQ: 20 TABLET, EXTENDED RELEASE ORAL at 05:51

## 2021-03-24 RX ADMIN — SIMETHICONE 80 MG: 80 TABLET, CHEWABLE ORAL at 20:59

## 2021-03-24 RX ADMIN — SIMETHICONE 80 MG: 80 TABLET, CHEWABLE ORAL at 05:51

## 2021-03-24 RX ADMIN — APIXABAN 5 MG: 5 TABLET, FILM COATED ORAL at 09:29

## 2021-03-24 RX ADMIN — OMEPRAZOLE 20 MG: 20 CAPSULE, DELAYED RELEASE ORAL at 05:52

## 2021-03-24 RX ADMIN — MIDODRINE HYDROCHLORIDE 5 MG: 5 TABLET ORAL at 09:29

## 2021-03-24 RX ADMIN — APIXABAN 5 MG: 5 TABLET, FILM COATED ORAL at 20:59

## 2021-03-24 RX ADMIN — MIDODRINE HYDROCHLORIDE 5 MG: 5 TABLET ORAL at 20:59

## 2021-03-24 RX ADMIN — LEVOTHYROXINE SODIUM 112 MCG: 0.11 TABLET ORAL at 05:51

## 2021-03-24 RX ADMIN — MIDODRINE HYDROCHLORIDE 5 MG: 5 TABLET ORAL at 15:03

## 2021-03-24 RX ADMIN — DOCUSATE SODIUM 50 MG AND SENNOSIDES 8.6 MG 2 TABLET: 8.6; 5 TABLET, FILM COATED ORAL at 20:59

## 2021-03-24 RX ADMIN — METOPROLOL SUCCINATE 25 MG: 25 TABLET, EXTENDED RELEASE ORAL at 05:52

## 2021-03-24 RX ADMIN — ZIPRASIDONE HYDROCHLORIDE 60 MG: 60 CAPSULE ORAL at 09:28

## 2021-03-24 RX ADMIN — PAROXETINE HYDROCHLORIDE 20 MG: 20 TABLET, FILM COATED ORAL at 09:29

## 2021-03-24 ASSESSMENT — COGNITIVE AND FUNCTIONAL STATUS - GENERAL
DRESSING REGULAR LOWER BODY CLOTHING: A LITTLE
DAILY ACTIVITIY SCORE: 21
SUGGESTED CMS G CODE MODIFIER DAILY ACTIVITY: CJ
TOILETING: A LITTLE
HELP NEEDED FOR BATHING: A LITTLE

## 2021-03-24 ASSESSMENT — ENCOUNTER SYMPTOMS
MYALGIAS: 0
FOCAL WEAKNESS: 0
HEADACHES: 0
COUGH: 0
CHILLS: 0
FEVER: 0
VOMITING: 0
NAUSEA: 0
EYES NEGATIVE: 1
DEPRESSION: 1
ABDOMINAL PAIN: 0
HALLUCINATIONS: 1

## 2021-03-24 NOTE — CARE PLAN
Problem: Communication  Goal: The ability to communicate needs accurately and effectively will improve  Outcome: PROGRESSING AS EXPECTED  Patient is looking forward to the reversal of the ostomy tomorrow and asked if this RN can please update him with any information regarding the procedure.      Problem: Mobility  Goal: Risk for activity intolerance will decrease  Outcome: PROGRESSING SLOWER THAN EXPECTED  Patient agreeable to getting out of bed for some mobility before lunch today.      Problem: Pain Management  Goal: Pain level will decrease to patient's comfort goal  Outcome: PROGRESSING AS EXPECTED  Patient reports no pain at this time.      Problem: Potential for Self Harm  Goal: No active self-harm  Outcome: PROGRESSING AS EXPECTED  Patient currently has no active plan for self-harm. Will continue to monitor and assess for any changes regarding self-harm.  Patient is also on a tele sitter.

## 2021-03-24 NOTE — CARE PLAN
Problem: Communication  Goal: The ability to communicate needs accurately and effectively will improve  Outcome: PROGRESSING AS EXPECTED     Problem: Safety  Goal: Will remain free from injury  Outcome: PROGRESSING AS EXPECTED  Goal: Will remain free from falls  Outcome: PROGRESSING AS EXPECTED     Problem: Bowel/Gastric:  Goal: Normal bowel function is maintained or improved  Outcome: PROGRESSING AS EXPECTED  Note: Explained to patient that the plan is a possible reversal of ostomy on Thursday of this week.     Problem: Knowledge Deficit  Goal: Knowledge of disease process/condition, treatment plan, diagnostic tests, and medications will improve  Outcome: PROGRESSING AS EXPECTED     Problem: Discharge Barriers/Planning  Goal: Patient's continuum of care needs will be met  Outcome: PROGRESSING AS EXPECTED

## 2021-03-24 NOTE — DISCHARGE PLANNING
Spoke to  Jonas regarding patient's meeting with the court doctors today. Patient has been scheduled to meet with court doctors via telemedicine in the [place] [address] family room at 1415. Let ELISA Loza know of upcoming meeting this afternoon.

## 2021-03-24 NOTE — CONSULTS
"Brief Behavioral Health Note:    Length of Intervention: 30 minutes    HPI: Sebastián Castro is a 56 year old male with a significant medical history including atrial fibrillation, secondary hypercoagulable state, diabetes mellitus, diabetic neuropathy, CHF, who presented to the Emergency Department on 2/9/2021 after a syncopal episode.    ASSESSMENT: Patient was seen lying on hospital bed with Telesitter in room. Patient was pleasant, improved mood, easy to engage. Patient's speech was clear and coherent. Patient reports he feels hopeful related to his current medical condition. Patient states, \"They are going to reverse my colostomy\". \"I am going into surgery tomorrow\". Patient reports this surgery will improve his life as the colostomy bag was the precipitator to his suicidal ideations.   Patient further reports he has been participating with physical therapy. \"I think I need a few more days to get stronger so I can walk\". Patient denies current suicidal ideations stating, \"I am feeling better because of the surgery tomorrow\". I am encouraged by patient's improved mood. However, patient's mood change appears to be externally driven,  dependent upon the hopeful medical condition change.  As a result, I will not discontinue the hold at this time. However, we continue to re-evaluate to determine if the mood change is stable and patient can make a commitment towards his well being over the long term without conditions.    Recommendations:  1. Continue Legal Hold  2. Review regularly for stabilization of mood and non-recurrence of SI statement over the next day or two  3. Continue Telesitter  4.  Patient may have phone and call light  5. Visitors-yes    Thank you    Susan Ewing, Ph.D.,[facility]  "

## 2021-03-24 NOTE — PROGRESS NOTES
Patient is resting comfortably in bed. Patient was smiling and in a cheerful mood this AM and expressed he is happy/looking forward to the reversal of the ostomy tomorrow. Patient does continue to have a tele sitter in room.

## 2021-03-24 NOTE — THERAPY
"Occupational Therapy  Daily Treatment     Patient Name: Sebastián Castro  Age:  56 y.o., Sex:  male  Medical Record #: [id number]  Today's Date: 3/24/2021     Precautions: Fall Risk  Comments: Intermittent orthostatic hypotension    Assessment    Pt had a positive affect today and was motivated for activities. Pt is progressing w/ADL participation and is primarily limited by body habits and BLE weakness w/sit>stands. Recommend increasing daily OOB activity w/all staff to maximize recovery. OT will continue to follow     Plan    Treatment plan modified to 2 times per week until therapy goals are met for the following treatments:  Adaptive Equipment, Therapeutic Activities and Therapeutic Exercises.    DC Equipment Recommendations: Unable to determine at this time  Discharge Recommendations: Other -(TBD- pending sx will assess post op ) pt has potential to progress in this setting functionally w/all staff assisting w/mobility through out the day)    Subjective  \"I am so happy I just want to get this surgery done\"      Objective   03/24/21 1216   Pain 0 - 10 Group   Therapist Pain Assessment During Activity;Nurse Notified;0   Cognition    Cognition / Consciousness WDL   Level of Consciousness Alert   Comments more cooperative, pleasant and motivated today    Passive ROM Upper Body   Passive ROM Upper Body WDL   Active ROM Upper Body   Active ROM Upper Body  WDL   Strength Upper Body   Upper Body Strength  X   Gross Strength Generalized Weakness, Equal Bilaterally.    Sensation Upper Body   Upper Extremity Sensation  WDL   Upper Body Muscle Tone   Upper Body Muscle Tone  WDL   Other Treatments   Other Treatments Provided focused on safety w/4ww, rest breaks during ADL's, grooming at sink, partial bath seated EOB and functional mobility    Balance   Sitting Balance (Static) Fair   Sitting Balance (Dynamic) Fair   Standing Balance (Static) Fair   Standing Balance (Dynamic) Fair -   Weight Shift Sitting Fair   Weight Shift " Standing Fair   Skilled Intervention Verbal Cuing;Tactile Cuing   Comments w/4ww   Bed Mobility    Supine to Sit Minimal Assist   Sit to Supine Supervised   Skilled Intervention Verbal Cuing;Tactile Cuing   Activities of Daily Living   Grooming Supervision;Seated   Upper Body Dressing Supervision   Lower Body Dressing Moderate Assist   Toileting   (able to use urinal and ostomy still in place )   Skilled Intervention Verbal Cuing   How much help from another person does the patient currently need...   6 Clicks Daily Activity Score 21   Functional Mobility   Sit to Stand Minimal Assist   Bed, Chair, Wheelchair Transfer Minimal Assist   Mobility walking in room w4ww   Skilled Intervention Verbal Cuing;Tactile Cuing   Visual Perception   Visual Perception  Not Tested   Activity Tolerance   Comments no overt c/o pain or fatigue    Patient / Family Goals   Patient / Family Goal #1 walk   Goal #1 Outcome Goal met   Short Term Goals   Short Term Goal # 1 Pt will perform LB dressing with supervision   Goal Outcome # 1 Goal not met   Short Term Goal # 2 Pt will perform functional t/f's with supervision   Goal Outcome # 2 Progressing as expected   Short Term Goal # 3 Pt will perform h/g standing sink side with supervision   Goal Outcome # 3 Progressing as expected   Education Group   Role of Occupational Therapist Patient Response Patient;Acceptance;Explanation;Verbal Demonstration   Transfers Patient Response Patient;Acceptance;Explanation;Action Demonstration;Verbal Demonstration   ADL Patient Response Patient;Acceptance;Explanation;Verbal Demonstration;Action Demonstration   Anticipated Discharge Equipment and Recommendations   DC Equipment Recommendations Unable to determine at this time   Discharge Recommendations Other -  (TBD- pending sx will assess post op )   Interdisciplinary Plan of Care Collaboration   IDT Collaboration with  Nursing   Patient Position at End of Therapy In Bed;Call Light within Reach;Tray Table  within Reach;Phone within Reach   Collaboration Comments RN aware of OT tx    Session Information   Date / Session Number  3/24 #2 (1/2, 3/30)   Priority 2  (check post op)

## 2021-03-24 NOTE — THERAPY
"Physical Therapy   Daily Treatment     Patient Name: Sebastián Castro  Age:  56 y.o., Sex:  male  Medical Record #: [id number]  Today's Date: 3/23/2021     Precautions: Fall Risk    Assessment    Pt very motivated to mobilize today. Used therapist as bed rail to pull himself up from supine. Only light assist required to stand. Increased gait distance compared to previous session, cues for safety with turn as he tends to move outside support of 4WW. Denied dizziness, states he feels this has resolved. Pt motivated to progress, educated to ambulate with nursing staff in addition to therapy. Currently recommend placement but anticipate quick improvement.     Plan    Continue current treatment plan.    DC Equipment Recommendations: None  Discharge Recommendations: Recommend post-acute placement for additional physical therapy services prior to discharge home      Subjective    \"I'd like to walk\"     Objective       03/23/21 1531   Cognition    Level of Consciousness Alert   Comments very pleasant and motivated   Balance   Sitting Balance (Static) Fair   Sitting Balance (Dynamic) Fair   Standing Balance (Static) Fair   Standing Balance (Dynamic) Fair -   Weight Shift Sitting Fair   Weight Shift Standing Fair   Comments FWW   Gait Analysis   Gait Level Of Assist Minimal Assist   Assistive Device 4 Wheel Walker   Distance (Feet) 30   # of Times Distance was Traveled 1   Deviation Bradykinetic;Shuffled Gait   Bed Mobility    Supine to Sit Minimal Assist   Sit to Supine Supervised   Scooting Supervised   Functional Mobility   Sit to Stand Minimal Assist   Bed, Chair, Wheelchair Transfer Supervised   Short Term Goals    Short Term Goal # 1 Pt will perform supine<->sit with supv within 6 visits in order to return home   Goal Outcome # 1 goal not met   Short Term Goal # 2 Pt will transfer bed<->chair with 4WW with supv within 6 visits in order to return home   Goal Outcome # 2 Goal not met   Short Term Goal # 3 Pt will ambulate 100' " with supv within 6 visits in order to return home   Goal Outcome # 3 Goal not met   Short Term Goal # 4 Pt will ascend/descend 5 steps with supv within 6 visits in order to enter/exit home   Goal Outcome # 4 Goal not met

## 2021-03-24 NOTE — DISCHARGE PLANNING
Pt was due to meet with the court doctors today at 1415 to determine if legal hold is still needed after being on legal hold since 2/11/21. Pt refused to talk to the court doctors, per LAMAR Loza pt wants to remain on legal hold.     Court has continued legal hold for one week (4/1), pt will need to meet with court doctors next week.    Updated LAMAR Loza on continuation of legal hold.

## 2021-03-24 NOTE — PROGRESS NOTES
"Due to surgery not being appropriate at this time, patient was updated that surgery will not take place tomorrow to reverse his ostomy. Patient immediately notified this RN that he \"will do it as soon as he gets a chance\" and \"please keep the legal hold on me\". While in the room with patient, this RN notified Marianne charge RN and asked for a 1:1 sitter rather than the telesitter. 1:1 sitter at bedside, updated Susan Ewing, Ph.D.    "

## 2021-03-24 NOTE — PROGRESS NOTES
Hospital Medicine Daily Progress Note    Date of Service  3/24/2021    Chief Complaint  56 y.o. male admitted 2/9/2021 with syncope    Hospital Course  This is a 56 year-old man with a past medical history significant for atrial fibrillation, secondary hypercoagulable state. Diabetes mellitus with glyco of 5.2 complicated with diabetic neuropathy, history of congestive systolic heart failure, dyslipidemia presented to the ER on 2/9/2021 after he had a syncopal episode.    Patient does have a history of orthostatic hypotension and was started on fludrocortisone.  For the event, patient complained of significant dizziness.  Upon presentation in ER, patient was hypotensive along with bradycardia thus cardiology was consulted and evaluated and started to stopping all AV sanket blocking agent.  During the stay in the hospital, patient orthostatic vital signs continue to remain positive-was a started on midodrine 5 mg every 6 hours    Evaluated in the past by EP and neurology recurrent syncopal episode.  Currently patient is on digoxin 125 cc p.o. daily, along with Eliquis 5 mg p.o. twice daily for atrial fibrillation.    Echocardiogram was obtained which showed EF of 60%, improved from previous echocardiogram obtained on 11/2020.    Patient has h/o spleen injury, splenic flexure perforation in September 2020. S/p colostomy and Interiano's pouch in November 2020. He was at home taking care of his colostomy, however he has become depressed, overwhelmed and can no longer take care of his colostomy independently.    Psych continue to follow the patient, psychiatric medication as per psych recommendations.    Developed hypersensitivity rash and itching due to zosyn.   Switched to ciprofloxacin and treated with benadryl.  BCx resulted as pan-susceptible Klebsiella pneumo.   Discussed with pharmacist Ashley and decided on bactrim.      Interval Problem Update  I evaluated and examined the patient at the bedside.  Labs and imaging  were reviewed. Care plan was discussed with the patient and bedside nurse.    Pt still reported depression, suicidal ideation, hallucination. He said he still hear voices and told him to kill himself.     ostomy bag will be replaced on Thurs    PT OT recommended SNF, however pt cannot go SNF with legal hold, and he is not strong enough to go inpt pschy, Dc will be difficult.     Tele-sitter in place.    Consultants/Specialty  Psychiatry- SI  Cardiology - Hypotension and Afib    Code Status  Full Code    Disposition  Difficult discharge, PT OT recommended SNF, however pt cannot  go SNF with legal hold, and he is not strong enough to go inpt pschy, Dc will be difficult.     Review of Systems  Review of Systems   Constitutional: Positive for malaise/fatigue. Negative for chills and fever.   HENT: Negative.    Eyes: Negative.    Respiratory: Negative for cough.    Cardiovascular: Negative for chest pain.   Gastrointestinal: Negative for abdominal pain, nausea and vomiting.   Genitourinary: Negative for dysuria.   Musculoskeletal: Negative for myalgias.   Skin: Negative for rash.   Neurological: Negative for focal weakness and headaches.   Psychiatric/Behavioral: Positive for depression, hallucinations and suicidal ideas.        Physical Exam  Temp:  [36.3 °C (97.3 °F)-36.4 °C (97.6 °F)] 36.3 °C (97.3 °F)  Pulse:  [56-71] 56  Resp:  [17-20] 18  BP: (104-135)/(54-71) 127/61  SpO2:  [93 %-97 %] 97 %    Physical Exam  Vitals and nursing note reviewed.   Constitutional:       Appearance: He is ill-appearing.   HENT:      Head: Normocephalic.      Mouth/Throat:      Mouth: Mucous membranes are moist.      Pharynx: Oropharynx is clear.   Eyes:      Pupils: Pupils are equal, round, and reactive to light.   Cardiovascular:      Rate and Rhythm: Normal rate and regular rhythm.      Heart sounds: Normal heart sounds.   Pulmonary:      Effort: Pulmonary effort is normal. No respiratory distress.      Breath sounds: Normal breath  sounds. No wheezing.   Abdominal:      General: Abdomen is flat. Bowel sounds are normal.      Comments: Colostoma in place in left abdomen   Musculoskeletal:         General: Normal range of motion.      Cervical back: Normal range of motion.   Skin:     General: Skin is warm.   Neurological:      General: No focal deficit present.      Mental Status: He is alert and oriented to person, place, and time.   Psychiatric:         Mood and Affect: Mood is depressed.         Thought Content: Thought content includes suicidal ideation.         Fluids    Intake/Output Summary (Last 24 hours) at 3/24/2021 1140  Last data filed at 3/24/2021 0800  Gross per 24 hour   Intake 300 ml   Output 1050 ml   Net -750 ml       Laboratory  Recent Labs     03/22/21  0550 03/23/21  0533 03/24/21  0703   WBC 13.6* 14.1* 12.7*   RBC 4.86 4.83 4.75   HEMOGLOBIN 13.9* 13.6* 13.6*   HEMATOCRIT 43.4 43.1 42.0   MCV 89.3 89.2 88.4   MCH 28.6 28.2 28.6   MCHC 32.0* 31.6* 32.4*   RDW 47.2 47.5 47.4   PLATELETCT 420 381 331   MPV 9.6 9.7 9.6     Recent Labs     03/22/21  0550 03/23/21  0533 03/24/21  0703   SODIUM 140 136 133*   POTASSIUM 4.2 4.0 4.2   CHLORIDE 104 101 100   CO2 30 27 28   GLUCOSE 122* 113* 116*   BUN 29* 25* 20   CREATININE 1.00 0.91 0.83   CALCIUM 9.1 8.6 8.5                   Imaging  GD-PSFQMNT-5 VIEW   Final Result         No significant interval change.      DX-CHEST-LIMITED (1 VIEW)   Final Result         Diffuse interstitial prominence could relate to mild pulmonary edema or atypical infection      GL-TEYPLLF-1 VIEW   Final Result      Increased colonic gas without definite bowel obstruction.      US-RENAL   Final Result      No evidence of hydronephrosis.      Lobulated kidneys bilaterally.      CT-ABDOMEN-PELVIS WITH   Final Result      1.  There is no evidence of small bowel obstruction.   2.  There is a left lower quadrant ostomy.   3.  There is fluid distention of the colon distal to the surgical material in the left  mid descending colon extending all the way to the rectum. There is no pneumatosis or free air.   4.  There is cholelithiasis without biliary dilatation.   5.  There has been interval removal of the drainage catheter anterior to the spleen with minimal hypodense area in the anterior spleen again noted. There is no new fluid collection.      IR-US GUIDED PIV   Final Result    Ultrasound-guided PERIPHERAL IV INSERTION performed by    qualified nursing staff as above.      EC-ECHOCARDIOGRAM COMPLETE W/O CONT   Final Result      DX-LUMBAR SPINE-2 OR 3 VIEWS   Final Result      Moderate compression deformity of T11 is new compared to 2018.      Mild wedge deformity of T12 is unchanged.      Degenerative changes including facet arthropathy.      Mild retrolisthesis of L5 on S1 and L3 on L4.              Assessment/Plan  * Suicidal ideation- (present on admission)  Assessment & Plan  Has history of MDD, recurrent with psychotic features with active SI w/ plan.  Psychiatry consulted, continued legal hold  Legal hold until 3/18  To be discharged to psychiatric program pending medical clearance and when able to self-care for ostomy    Cardiogenic pulmonary edema (HCC)  Assessment & Plan  CXR demonstrates pulmonary vascular congestion / edema  NT-BNP 7828  Due to IV boluses for hypotension and N/V in setting of HFrEF with recovered EF  IV lasix tolerated with improvement in BP and Cr, discontinued 3/14 for severe sepsis and lactic acidosis  3/16: Currently saturating well on room air.    Major depressive disorder, recurrent, severe with psychotic features (HCC)- (present on admission)  Assessment & Plan  Psychiatry and Psychology following  Continue paroxetine, ziprasidone, and clonazepam    Secondary adrenal insufficiency (HCC)- (present on admission)  Assessment & Plan  Cosyntropin stimulation test reviewed with only a increase in his cortisol going from 14.5 then up to 21.3 after cosyntropin given  Renin 0.1, ACTH 3.4 and  slight increase with cosyntropin suggesting secondary AI  Random cortisol 9.9  Needs outpatient endocrinology follow up.  Repeat adrenal function after discharge  Repeat AM BMP    Florinef restarted 3/14 at 0.2 mg daily (2x dose) due to severe sepsis from GNR bacteremia in order to prevent adrenal crisis    Sepsis with acute renal failure without septic shock (HCC)  Assessment & Plan  This is Severe Sepsis Not present on admission  SIRS criteria identified on my evaluation include: Tachycardia, with heart rate greater than 90 BPM and Leukocytosis, with WBC greater than 12,000  Source of infection is GNR bacteremia  Clinical indicators of end organ dysfunction include Systolic blood pressure (SBP) <90 mmHg or mean arterial pressure <65 mmHg and Lactate >2 mmol/L (18.0 mg/dL)  Sepsis protocol initiated  Fluid resuscitation not ordered due to pulmonary edema seen on CXR due to HFrEF  IV antibiotics as appropriate for source of sepsis - Zosyn    BCx x2 (3/13) +GNR in one bottle, awaiting speciation and susceptbilities  UCx added to UA (3/13) that showed pyuria and bacteruria  Cdiff negative (3/12)  Prior microbiology date reviewed, h/o enterococcus and MRSA but currently not positive for GPCs    Reassessment: I have reassessed the patient's hemodynamic status  End organ dysfunction include(s):  Acute kidney failure ([facility])  Cr and BP improved after diuresis yesterday for cardiogenic pulmonary edema  Lactic Acid 2.8, improved with holding diuresis    Orthostatic hypotension- (present on admission)  Assessment & Plan  Multifactorial including adrenal insufficiency, medications (beta blocker, CCB, tamsulosin), diabetic autonomic dysfunction, and venous insufficiency  EF is noted to be 60%, significant improvement from prior.  Per cardiology, no further cardiac work-up needed  Follow-up with cardiology as an outpatient.  Continue midodrine, PRN IVF    Bacteremia  Assessment & Plan  Due to klebsiella bacteremia  BCx: 1 of 2  bottles +Klebsiella  Cdiff negative  UA: Bacteruria and pyuria  UCx: In progress  CXR with pulmonary edema but no infiltrates  3/16: Currently on Ancef.  3/19: Switch him to Bactrim to complete a 10-day course.    Abdominal bloating  Assessment & Plan  Improved  Had increasing gas output from ostomy  Intermittent episodes of abdominal pain and N/V  CT 3/4 did not demonstrate obstruction nor pneumotosis  KUB 3/12 for recurrent symptoms demonstrated increased gas but no signs of SBO  Simethicone scheduled 80 mg TID  If uncontrolled, would confer with general surgeon about further imaging and discuss empiric treatment for SIBO    Normal anion gap metabolic acidosis  Assessment & Plan  Resolved  Due to bicarbonate loss via ostomy  Repeat AM BMP    Syncope due to orthostatic hypotension- (present on admission)  Assessment & Plan  Likely secondary to orthostatic hypotension  Echo normal with 60% ejection fraction, improved from prior    Paroxysmal atrial fibrillation (HCC)- (present on admission)  Assessment & Plan  CHADSVASC 3 (HTN, CHF, DM).   TTE Feb 2021: EF 60%  Continue metoprolol, digoxin, and eliquis  Discontinued telemetry    Chronic heart failure with preserved ejection fraction (HCC)- (present on admission)  Assessment & Plan  Decompensated based on peripheral and pulmonary edema, mild hyponatremia  IV diuresis initiated 3/13 for pulmonary edema, held 3/14 in setting of lactic acidosis from severe sepsis  Recovered EF 60% on repeat echo.  Continue metoprolol XL  Lisinopril held per Cardiology recommendations due to hypotension    Diabetes mellitus type 2, insulin dependent (HCC)- (present on admission)  Assessment & Plan  A1c 5.2  Diabetic diet  Restarting accuchecks and SSI due to GNR sepsis  BG goal <200 with bacteremia  Continue atorvastatin    Colostomy present (HCC)- (present on admission)  Assessment & Plan  History of fall at home Nov 2020 on left side with perforated colon and splenic fluid  collection/hematoma s/p segmental colectomy, colostomy, mobilization of the splenic flexure, wound vac, and I&D Nov 2020.   Ostomy care consulted, but he was non-participatory  He is engaging in ostomy care with his bedside RNs, encouraged to be observed but do it independently  Patient must be independently able to care for his ostomy before inpatient psychiatry will accept him  Dr. Desouza planning for reversal of ostomy on Thursday    Anxiety and depression- (present on admission)  Assessment & Plan  On Legal Hold until 3/18  Psychiatry consulted  Continue paroxetine, ziprasidone, and clonazepam  Would likely benefit from CBT or DBT  3/16: Psychiatry recommending transferring to inpatient psychiatric facility once medically cleared.  However patient has to perform self ostomy care which has been challenging.  Management and  working on best safe discharge plan.    Hypothyroidism- (present on admission)  Assessment & Plan  TSH wnl Feb 2021  Continue home Levothyroxine    Acute renal failure superimposed on stage 3 chronic kidney disease (HCC)- (present on admission)  Assessment & Plan  CKD 3  Had improved with increased midodrine and diuresis, but worsened this morning in setting of holding diuresis for hypotension and lactic acidosis  500 cc NS bolus today, repeat BMP this afternoon  Developed AHRF with pulmonary edema from 1 L bolus, judicious IVF  Diuresis held, continue midodrine  3/16: Discontinue Bactrim as it will worsen kidney functions. Slowly improving.  Avoid nephrotoxins.  Renally dose all medications.  3/18: Back to baseline.    Debility- (present on admission)  Assessment & Plan  Likely due to depression  PT/OT evaluated, no further skilled needs    Essential hypertension- (present on admission)  Assessment & Plan  Resolved, has been hypotensive this admission in setting of secondary adrenal insufficiency  Continue midodrine    Lower limb amputation, great toe (HCC)- (present on  admission)  Assessment & Plan  s/p right great toe amputation 2018    Hypomagnesemia  Assessment & Plan  Mg WNL today  Repeat AM BMP/Mg    Hypokalemia  Assessment & Plan  K 3.2  Lasix held yesterday due to hypotension and lactic acidosis  Continue Kdur 20 daily  Discussed with pharmacy, no additional K today due to risk of hyperkalemia with bactrim  Mg WNL  Repeat AM BMP/Mg    Hyponatremia  Assessment & Plan  Moderate, Na 127  In setting of IV lasix, holding lasix due to severe sepsis with lactic acidosis  500 cc NS trial today, repeat afternoon BMP  If worsening will get Delmis and Uosm  Repeat AM BMP    Obstructive uropathy- (present on admission)  Assessment & Plan  Urinary retention on bladder scans by straight cath.  Zabala catheter was placed temporarily.  Remove with voiding trials per nursing protocol.  Started on Flomax.  3/19: Zabala catheter was removed.  Voiding trials successful so far.    Rash- (present on admission)  Assessment & Plan  Patient developed pruritic drug related rash   Prn Benadryl, steroids   3/18: Resolving.    Chronic venous insufficiency of lower extremity- (present on admission)  Assessment & Plan  Compression stockings    Elevated troponin- (present on admission)  Assessment & Plan  RESOLVED  Stable. Suspect due to demand ischemia. No need to trend further.    Nonischemic cardiomyopathy (HCC)- (present on admission)  Assessment & Plan  Normal coronaries, 2018.  Tachycardia mediated per cardiology  LVEF improved, based on echo 2/10/2021 LVEF 60%    Chest pain- (present on admission)  Assessment & Plan  Resolved  EKG and Troponins not consistent with ACS    Mixed hyperlipidemia- (present on admission)  Assessment & Plan  Continue atorvastatin    Obesity- (present on admission)  Assessment & Plan  Body mass index is 37.79 kg/m².  Counseled about diet and exercise       VTE prophylaxis: apixaban

## 2021-03-25 LAB
ALBUMIN SERPL BCP-MCNC: 2.5 G/DL (ref 3.2–4.9)
ALBUMIN/GLOB SERPL: 0.8 G/DL
ALP SERPL-CCNC: 102 U/L (ref 30–99)
ALT SERPL-CCNC: 27 U/L (ref 2–50)
ANION GAP SERPL CALC-SCNC: 6 MMOL/L (ref 7–16)
AST SERPL-CCNC: 20 U/L (ref 12–45)
BASOPHILS # BLD AUTO: 0.8 % (ref 0–1.8)
BASOPHILS # BLD: 0.1 K/UL (ref 0–0.12)
BILIRUB SERPL-MCNC: 0.3 MG/DL (ref 0.1–1.5)
BUN SERPL-MCNC: 20 MG/DL (ref 8–22)
CALCIUM SERPL-MCNC: 8.8 MG/DL (ref 8.5–10.5)
CHLORIDE SERPL-SCNC: 105 MMOL/L (ref 96–112)
CO2 SERPL-SCNC: 27 MMOL/L (ref 20–33)
CREAT SERPL-MCNC: 0.93 MG/DL (ref 0.5–1.4)
EOSINOPHIL # BLD AUTO: 1.1 K/UL (ref 0–0.51)
EOSINOPHIL NFR BLD: 8.4 % (ref 0–6.9)
ERYTHROCYTE [DISTWIDTH] IN BLOOD BY AUTOMATED COUNT: 47.5 FL (ref 35.9–50)
GLOBULIN SER CALC-MCNC: 3.1 G/DL (ref 1.9–3.5)
GLUCOSE BLD-MCNC: 106 MG/DL (ref 65–99)
GLUCOSE BLD-MCNC: 130 MG/DL (ref 65–99)
GLUCOSE BLD-MCNC: 135 MG/DL (ref 65–99)
GLUCOSE BLD-MCNC: 137 MG/DL (ref 65–99)
GLUCOSE SERPL-MCNC: 120 MG/DL (ref 65–99)
HCT VFR BLD AUTO: 40.9 % (ref 42–52)
HGB BLD-MCNC: 13 G/DL (ref 14–18)
IMM GRANULOCYTES # BLD AUTO: 0.08 K/UL (ref 0–0.11)
IMM GRANULOCYTES NFR BLD AUTO: 0.6 % (ref 0–0.9)
LYMPHOCYTES # BLD AUTO: 4 K/UL (ref 1–4.8)
LYMPHOCYTES NFR BLD: 30.6 % (ref 22–41)
MCH RBC QN AUTO: 28.4 PG (ref 27–33)
MCHC RBC AUTO-ENTMCNC: 31.8 G/DL (ref 33.7–35.3)
MCV RBC AUTO: 89.3 FL (ref 81.4–97.8)
MONOCYTES # BLD AUTO: 0.73 K/UL (ref 0–0.85)
MONOCYTES NFR BLD AUTO: 5.6 % (ref 0–13.4)
NEUTROPHILS # BLD AUTO: 7.07 K/UL (ref 1.82–7.42)
NEUTROPHILS NFR BLD: 54 % (ref 44–72)
NRBC # BLD AUTO: 0 K/UL
NRBC BLD-RTO: 0 /100 WBC
PLATELET # BLD AUTO: 315 K/UL (ref 164–446)
PMV BLD AUTO: 10.2 FL (ref 9–12.9)
POTASSIUM SERPL-SCNC: 4.3 MMOL/L (ref 3.6–5.5)
PROT SERPL-MCNC: 5.6 G/DL (ref 6–8.2)
RBC # BLD AUTO: 4.58 M/UL (ref 4.7–6.1)
SODIUM SERPL-SCNC: 138 MMOL/L (ref 135–145)
WBC # BLD AUTO: 13.1 K/UL (ref 4.8–10.8)

## 2021-03-25 PROCEDURE — 700102 HCHG RX REV CODE 250 W/ 637 OVERRIDE(OP): Performed by: STUDENT IN AN ORGANIZED HEALTH CARE EDUCATION/TRAINING PROGRAM

## 2021-03-25 PROCEDURE — 82962 GLUCOSE BLOOD TEST: CPT

## 2021-03-25 PROCEDURE — 770001 HCHG ROOM/CARE - MED/SURG/GYN PRIV*

## 2021-03-25 PROCEDURE — A9270 NON-COVERED ITEM OR SERVICE: HCPCS | Performed by: STUDENT IN AN ORGANIZED HEALTH CARE EDUCATION/TRAINING PROGRAM

## 2021-03-25 PROCEDURE — A9270 NON-COVERED ITEM OR SERVICE: HCPCS | Performed by: HOSPITALIST

## 2021-03-25 PROCEDURE — 80053 COMPREHEN METABOLIC PANEL: CPT

## 2021-03-25 PROCEDURE — A9270 NON-COVERED ITEM OR SERVICE: HCPCS | Performed by: FAMILY MEDICINE

## 2021-03-25 PROCEDURE — 36415 COLL VENOUS BLD VENIPUNCTURE: CPT

## 2021-03-25 PROCEDURE — 700102 HCHG RX REV CODE 250 W/ 637 OVERRIDE(OP): Performed by: FAMILY MEDICINE

## 2021-03-25 PROCEDURE — 99232 SBSQ HOSP IP/OBS MODERATE 35: CPT | Performed by: STUDENT IN AN ORGANIZED HEALTH CARE EDUCATION/TRAINING PROGRAM

## 2021-03-25 PROCEDURE — 700102 HCHG RX REV CODE 250 W/ 637 OVERRIDE(OP): Performed by: HOSPITALIST

## 2021-03-25 PROCEDURE — 85025 COMPLETE CBC W/AUTO DIFF WBC: CPT

## 2021-03-25 RX ADMIN — MIDODRINE HYDROCHLORIDE 5 MG: 5 TABLET ORAL at 17:17

## 2021-03-25 RX ADMIN — CLONAZEPAM 0.5 MG: 0.5 TABLET ORAL at 08:47

## 2021-03-25 RX ADMIN — APIXABAN 5 MG: 5 TABLET, FILM COATED ORAL at 20:22

## 2021-03-25 RX ADMIN — LEVOTHYROXINE SODIUM 112 MCG: 0.11 TABLET ORAL at 05:51

## 2021-03-25 RX ADMIN — DOCUSATE SODIUM 50 MG AND SENNOSIDES 8.6 MG 2 TABLET: 8.6; 5 TABLET, FILM COATED ORAL at 20:22

## 2021-03-25 RX ADMIN — ZIPRASIDONE HYDROCHLORIDE 60 MG: 60 CAPSULE ORAL at 20:22

## 2021-03-25 RX ADMIN — DOCUSATE SODIUM 50 MG AND SENNOSIDES 8.6 MG 2 TABLET: 8.6; 5 TABLET, FILM COATED ORAL at 08:47

## 2021-03-25 RX ADMIN — POTASSIUM CHLORIDE 20 MEQ: 20 TABLET, EXTENDED RELEASE ORAL at 05:51

## 2021-03-25 RX ADMIN — ZIPRASIDONE HYDROCHLORIDE 60 MG: 60 CAPSULE ORAL at 08:47

## 2021-03-25 RX ADMIN — SIMETHICONE 80 MG: 80 TABLET, CHEWABLE ORAL at 12:23

## 2021-03-25 RX ADMIN — PAROXETINE HYDROCHLORIDE 20 MG: 20 TABLET, FILM COATED ORAL at 08:47

## 2021-03-25 RX ADMIN — SIMETHICONE 80 MG: 80 TABLET, CHEWABLE ORAL at 17:17

## 2021-03-25 RX ADMIN — TAMSULOSIN HYDROCHLORIDE 0.4 MG: 0.4 CAPSULE ORAL at 05:51

## 2021-03-25 RX ADMIN — MIDODRINE HYDROCHLORIDE 5 MG: 5 TABLET ORAL at 08:47

## 2021-03-25 RX ADMIN — MIDODRINE HYDROCHLORIDE 5 MG: 5 TABLET ORAL at 12:23

## 2021-03-25 RX ADMIN — FLUDROCORTISONE ACETATE 0.1 MG: 0.1 TABLET ORAL at 05:51

## 2021-03-25 RX ADMIN — ATORVASTATIN CALCIUM 40 MG: 40 TABLET, FILM COATED ORAL at 17:17

## 2021-03-25 RX ADMIN — OMEPRAZOLE 20 MG: 20 CAPSULE, DELAYED RELEASE ORAL at 05:51

## 2021-03-25 RX ADMIN — DIGOXIN 125 MCG: 125 TABLET ORAL at 17:17

## 2021-03-25 RX ADMIN — SIMETHICONE 80 MG: 80 TABLET, CHEWABLE ORAL at 05:50

## 2021-03-25 RX ADMIN — CLONAZEPAM 1 MG: 1 TABLET ORAL at 17:17

## 2021-03-25 RX ADMIN — METOPROLOL SUCCINATE 25 MG: 25 TABLET, EXTENDED RELEASE ORAL at 05:51

## 2021-03-25 RX ADMIN — INSULIN GLARGINE 5 UNITS: 100 INJECTION, SOLUTION SUBCUTANEOUS at 05:53

## 2021-03-25 RX ADMIN — APIXABAN 5 MG: 5 TABLET, FILM COATED ORAL at 08:48

## 2021-03-25 ASSESSMENT — ENCOUNTER SYMPTOMS
ABDOMINAL PAIN: 0
FOCAL WEAKNESS: 0
HALLUCINATIONS: 1
MYALGIAS: 0
FEVER: 0
HEADACHES: 0
DEPRESSION: 1
EYES NEGATIVE: 1
VOMITING: 0
CHILLS: 0
COUGH: 0
NAUSEA: 0

## 2021-03-25 ASSESSMENT — PAIN DESCRIPTION - PAIN TYPE: TYPE: ACUTE PAIN

## 2021-03-25 NOTE — CARE PLAN
Problem: Communication  Goal: The ability to communicate needs accurately and effectively will improve  Outcome: PROGRESSING AS EXPECTED     Problem: Safety  Goal: Will remain free from falls  Outcome: PROGRESSING AS EXPECTED     Problem: Bowel/Gastric:  Goal: Normal bowel function is maintained or improved  Outcome: PROGRESSING AS EXPECTED     Problem: Knowledge Deficit  Goal: Knowledge of disease process/condition, treatment plan, diagnostic tests, and medications will improve  Outcome: PROGRESSING AS EXPECTED     Problem: Psychosocial Needs:  Goal: Level of anxiety will decrease  Outcome: PROGRESSING SLOWER THAN EXPECTED

## 2021-03-25 NOTE — CARE PLAN
Problem: Potential for Self Harm  Goal: No active self-harm  Outcome: PROGRESSING AS EXPECTED  Note: Patient will not harm self throughout shift.  Goal: Symptom reduction and improved functioning  Outcome: PROGRESSING AS EXPECTED  Note: Patient will engage in conversation with staff throughout shift.     Problem: Ineffective Coping  Goal: No active self-harm  Outcome: PROGRESSING AS EXPECTED  Note: Patient will not harm self throughout shift.  Goal: Symptom reduction and improved functioning  Outcome: PROGRESSING AS EXPECTED  Note: Patient will engage in conversation with staff throughout shift.

## 2021-03-25 NOTE — PROGRESS NOTES
Patient under close observation precautions due to SI with legal hold. Sitter at bedside. Room checklist completed.

## 2021-03-25 NOTE — PROGRESS NOTES
Hospital Medicine Daily Progress Note    Date of Service  3/25/2021    Chief Complaint  56 y.o. male admitted 2/9/2021 with syncope    Hospital Course  This is a 56 year-old man with a past medical history significant for atrial fibrillation, secondary hypercoagulable state. Diabetes mellitus with glyco of 5.2 complicated with diabetic neuropathy, history of congestive systolic heart failure, dyslipidemia presented to the ER on 2/9/2021 after he had a syncopal episode.    Patient does have a history of orthostatic hypotension and was started on fludrocortisone.  For the event, patient complained of significant dizziness.  Upon presentation in ER, patient was hypotensive along with bradycardia thus cardiology was consulted and evaluated and started to stopping all AV sanket blocking agent.  During the stay in the hospital, patient orthostatic vital signs continue to remain positive-was a started on midodrine 5 mg every 6 hours    Evaluated in the past by EP and neurology recurrent syncopal episode.  Currently patient is on digoxin 125 cc p.o. daily, along with Eliquis 5 mg p.o. twice daily for atrial fibrillation.    Echocardiogram was obtained which showed EF of 60%, improved from previous echocardiogram obtained on 11/2020.    Patient has h/o spleen injury, splenic flexure perforation in September 2020. S/p colostomy and Interiano's pouch in November 2020. He was at home taking care of his colostomy, however he has become depressed, overwhelmed and can no longer take care of his colostomy independently.    Psych continue to follow the patient, psychiatric medication as per psych recommendations.    Developed hypersensitivity rash and itching due to zosyn.   Switched to ciprofloxacin and treated with benadryl.  BCx resulted as pan-susceptible Klebsiella pneumo.   Discussed with pharmacist Ashley and decided on bactrim.      Interval Problem Update  I evaluated and examined the patient at the bedside.  Labs and imaging  were reviewed. Care plan was discussed with the patient and bedside nurse.    Pt still reported depression, suicidal ideation, hallucination.   Surgery discussed with the patient, high risk of ostomy reversal given his complexities.  Patient was very disappointed, back to one-to-one sitter instead of the telemetry sitter.     Legal hold extended 4/1, need to meet court physician next week for further decision    PT OT recommended SNF, however pt cannot go SNF with legal hold, and he is not strong enough to go inpt pschy, Dc will be difficult.       Consultants/Specialty  Psychiatry- SI  Cardiology - Hypotension and Afib  General surgery    Code Status  Full Code    Disposition  Difficult discharge, PT OT recommended SNF, however pt cannot  go SNF with legal hold, and he is not strong enough to go inpt pschy, Dc will be difficult.     Review of Systems  Review of Systems   Constitutional: Positive for malaise/fatigue. Negative for chills and fever.   HENT: Negative.    Eyes: Negative.    Respiratory: Negative for cough.    Cardiovascular: Negative for chest pain.   Gastrointestinal: Negative for abdominal pain, nausea and vomiting.   Genitourinary: Negative for dysuria.   Musculoskeletal: Negative for myalgias.   Skin: Negative for rash.   Neurological: Negative for focal weakness and headaches.   Psychiatric/Behavioral: Positive for depression, hallucinations and suicidal ideas.        Physical Exam  Temp:  [36.1 °C (97 °F)-36.3 °C (97.4 °F)] 36.3 °C (97.4 °F)  Pulse:  [] 100  Resp:  [16-18] 18  BP: (102-127)/(51-66) 127/66  SpO2:  [93 %-96 %] 93 %    Physical Exam  Vitals and nursing note reviewed.   Constitutional:       Appearance: He is ill-appearing.   HENT:      Head: Normocephalic.      Mouth/Throat:      Mouth: Mucous membranes are moist.      Pharynx: Oropharynx is clear.   Eyes:      Pupils: Pupils are equal, round, and reactive to light.   Cardiovascular:      Rate and Rhythm: Normal rate and  regular rhythm.      Heart sounds: Normal heart sounds.   Pulmonary:      Effort: Pulmonary effort is normal. No respiratory distress.      Breath sounds: Normal breath sounds. No wheezing.   Abdominal:      General: Abdomen is flat. Bowel sounds are normal.      Comments: Colostoma in place in left abdomen   Musculoskeletal:         General: Normal range of motion.      Cervical back: Normal range of motion.   Skin:     General: Skin is warm.   Neurological:      General: No focal deficit present.      Mental Status: He is alert and oriented to person, place, and time.   Psychiatric:         Mood and Affect: Mood is depressed.         Thought Content: Thought content includes suicidal ideation.         Fluids    Intake/Output Summary (Last 24 hours) at 3/25/2021 1559  Last data filed at 3/25/2021 1500  Gross per 24 hour   Intake 720 ml   Output 1350 ml   Net -630 ml       Laboratory  Recent Labs     03/23/21  0533 03/24/21  0703 03/25/21  0332   WBC 14.1* 12.7* 13.1*   RBC 4.83 4.75 4.58*   HEMOGLOBIN 13.6* 13.6* 13.0*   HEMATOCRIT 43.1 42.0 40.9*   MCV 89.2 88.4 89.3   MCH 28.2 28.6 28.4   MCHC 31.6* 32.4* 31.8*   RDW 47.5 47.4 47.5   PLATELETCT 381 331 315   MPV 9.7 9.6 10.2     Recent Labs     03/23/21  0533 03/24/21  0703 03/25/21  0332   SODIUM 136 133* 138   POTASSIUM 4.0 4.2 4.3   CHLORIDE 101 100 105   CO2 27 28 27   GLUCOSE 113* 116* 120*   BUN 25* 20 20   CREATININE 0.91 0.83 0.93   CALCIUM 8.6 8.5 8.8                   Imaging  [id number] VIEW   Final Result         No significant interval change.      DX-CHEST-LIMITED (1 VIEW)   Final Result         Diffuse interstitial prominence could relate to mild pulmonary edema or atypical infection      [id number] VIEW   Final Result      Increased colonic gas without definite bowel obstruction.      US-RENAL   Final Result      No evidence of hydronephrosis.      Lobulated kidneys bilaterally.      CT-ABDOMEN-PELVIS WITH   Final Result      1.  There is no  evidence of small bowel obstruction.   2.  There is a left lower quadrant ostomy.   3.  There is fluid distention of the colon distal to the surgical material in the left mid descending colon extending all the way to the rectum. There is no pneumatosis or free air.   4.  There is cholelithiasis without biliary dilatation.   5.  There has been interval removal of the drainage catheter anterior to the spleen with minimal hypodense area in the anterior spleen again noted. There is no new fluid collection.      IR-US GUIDED PIV   Final Result    Ultrasound-guided PERIPHERAL IV INSERTION performed by    qualified nursing staff as above.      EC-ECHOCARDIOGRAM COMPLETE W/O CONT   Final Result      DX-LUMBAR SPINE-2 OR 3 VIEWS   Final Result      Moderate compression deformity of T11 is new compared to 2018.      Mild wedge deformity of T12 is unchanged.      Degenerative changes including facet arthropathy.      Mild retrolisthesis of L5 on S1 and L3 on L4.              Assessment/Plan  * Suicidal ideation- (present on admission)  Assessment & Plan  Has history of MDD, recurrent with psychotic features with active SI w/ plan.  Psychiatry consulted, continued legal hold  To be discharged to psychiatric program pending medical clearance and when able to self-care for ostomy    Legal hold extended 4/1, need to meet court physician next week for further decision    Cardiogenic pulmonary edema (HCC)  Assessment & Plan  CXR demonstrates pulmonary vascular congestion / edema  NT-BNP 7828  Due to IV boluses for hypotension and N/V in setting of HFrEF with recovered EF  IV lasix tolerated with improvement in BP and Cr, discontinued 3/14 for severe sepsis and lactic acidosis  3/16: Currently saturating well on room air.    Major depressive disorder, recurrent, severe with psychotic features (HCC)- (present on admission)  Assessment & Plan  Psychiatry and Psychology following  Continue paroxetine, ziprasidone, and  clonazepam    Secondary adrenal insufficiency (HCC)- (present on admission)  Assessment & Plan  Cosyntropin stimulation test reviewed with only a increase in his cortisol going from 14.5 then up to 21.3 after cosyntropin given  Renin 0.1, ACTH 3.4 and slight increase with cosyntropin suggesting secondary AI  Random cortisol 9.9  Needs outpatient endocrinology follow up.  Repeat adrenal function after discharge  Repeat AM BMP    Florinef restarted 3/14 at 0.2 mg daily (2x dose) due to severe sepsis from GNR bacteremia in order to prevent adrenal crisis    Sepsis with acute renal failure without septic shock (HCC)  Assessment & Plan  This is Severe Sepsis Not present on admission  SIRS criteria identified on my evaluation include: Tachycardia, with heart rate greater than 90 BPM and Leukocytosis, with WBC greater than 12,000  Source of infection is GNR bacteremia  Clinical indicators of end organ dysfunction include Systolic blood pressure (SBP) <90 mmHg or mean arterial pressure <65 mmHg and Lactate >2 mmol/L (18.0 mg/dL)  Sepsis protocol initiated  Fluid resuscitation not ordered due to pulmonary edema seen on CXR due to HFrEF  IV antibiotics as appropriate for source of sepsis - Zosyn    BCx x2 (3/13) +GNR in one bottle, awaiting speciation and susceptbilities  UCx added to UA (3/13) that showed pyuria and bacteruria  Cdiff negative (3/12)  Prior microbiology date reviewed, h/o enterococcus and MRSA but currently not positive for GPCs    Reassessment: I have reassessed the patient's hemodynamic status  End organ dysfunction include(s):  Acute kidney failure (SRUTHI)  Cr and BP improved after diuresis yesterday for cardiogenic pulmonary edema  Lactic Acid 2.8, improved with holding diuresis    Orthostatic hypotension- (present on admission)  Assessment & Plan  Multifactorial including adrenal insufficiency, medications (beta blocker, CCB, tamsulosin), diabetic autonomic dysfunction, and venous insufficiency  EF is  noted to be 60%, significant improvement from prior.  Per cardiology, no further cardiac work-up needed  Follow-up with cardiology as an outpatient.  Continue midodrine, PRN IVF    Bacteremia  Assessment & Plan  Due to klebsiella bacteremia  BCx: 1 of 2 bottles +Klebsiella  Cdiff negative  UA: Bacteruria and pyuria  UCx: In progress  CXR with pulmonary edema but no infiltrates  3/16: Currently on Ancef.  3/19: Switch him to Bactrim to complete a 10-day course.    Abdominal bloating  Assessment & Plan  Improved  Had increasing gas output from ostomy  Intermittent episodes of abdominal pain and N/V  CT 3/4 did not demonstrate obstruction nor pneumotosis  KUB 3/12 for recurrent symptoms demonstrated increased gas but no signs of SBO  Simethicone scheduled 80 mg TID  If uncontrolled, would confer with general surgeon about further imaging and discuss empiric treatment for SIBO    Normal anion gap metabolic acidosis  Assessment & Plan  Resolved  Due to bicarbonate loss via ostomy  Repeat AM BMP    Syncope due to orthostatic hypotension- (present on admission)  Assessment & Plan  Likely secondary to orthostatic hypotension  Echo normal with 60% ejection fraction, improved from prior    Paroxysmal atrial fibrillation (HCC)- (present on admission)  Assessment & Plan  CHADSVASC 3 (HTN, CHF, DM).   TTE Feb 2021: EF 60%  Continue metoprolol, digoxin, and eliquis  Discontinued telemetry    Chronic heart failure with preserved ejection fraction (HCC)- (present on admission)  Assessment & Plan  Decompensated based on peripheral and pulmonary edema, mild hyponatremia  IV diuresis initiated 3/13 for pulmonary edema, held 3/14 in setting of lactic acidosis from severe sepsis  Recovered EF 60% on repeat echo.  Continue metoprolol XL  Lisinopril held per Cardiology recommendations due to hypotension    Diabetes mellitus type 2, insulin dependent (HCC)- (present on admission)  Assessment & Plan  A1c 5.2  Diabetic diet  Restarting  accuchecks and SSI due to GNR sepsis  BG goal <200 with bacteremia  Continue atorvastatin    Colostomy present (HCC)- (present on admission)  Assessment & Plan  History of fall at home Nov 2020 on left side with perforated colon and splenic fluid collection/hematoma s/p segmental colectomy, colostomy, mobilization of the splenic flexure, wound vac, and I&D Nov 2020.   Ostomy care consulted, but he was non-participatory  He is engaging in ostomy care with his bedside RNs, encouraged to be observed but do it independently  Patient must be independently able to care for his ostomy before inpatient psychiatry will accept him    3/24 Surgery Dr. Desouza  discussed with the patient, high risk of ostomy reversal given his complexities.  Patient was very disappointed, back to one-to-one sitter instead of the telemetry sitter.       Anxiety and depression- (present on admission)  Assessment & Plan  On Legal Hold until 3/18  Psychiatry consulted  Continue paroxetine, ziprasidone, and clonazepam  Would likely benefit from CBT or DBT  3/16: Psychiatry recommending transferring to inpatient psychiatric facility once medically cleared.  However patient has to perform self ostomy care which has been challenging.  Management and  working on best safe discharge plan.    Hypothyroidism- (present on admission)  Assessment & Plan  TSH wnl Feb 2021  Continue home Levothyroxine    Acute renal failure superimposed on stage 3 chronic kidney disease (HCC)- (present on admission)  Assessment & Plan  CKD 3  Had improved with increased midodrine and diuresis, but worsened this morning in setting of holding diuresis for hypotension and lactic acidosis  500 cc NS bolus today, repeat BMP this afternoon  Developed AHRF with pulmonary edema from 1 L bolus, judicious IVF  Diuresis held, continue midodrine  3/16: Discontinue Bactrim as it will worsen kidney functions. Slowly improving.  Avoid nephrotoxins.  Renally dose all  medications.  3/18: Back to baseline.    Debility- (present on admission)  Assessment & Plan  Likely due to depression  PT/OT evaluated, no further skilled needs    Essential hypertension- (present on admission)  Assessment & Plan  Resolved, has been hypotensive this admission in setting of secondary adrenal insufficiency  Continue midodrine    Lower limb amputation, great toe (HCC)- (present on admission)  Assessment & Plan  s/p right great toe amputation 2018    Hypomagnesemia  Assessment & Plan  Mg WNL today  Repeat AM BMP/Mg    Hypokalemia  Assessment & Plan  K 3.2  Lasix held yesterday due to hypotension and lactic acidosis  Continue Kdur 20 daily  Discussed with pharmacy, no additional K today due to risk of hyperkalemia with bactrim  Mg WNL  Repeat AM BMP/Mg    Hyponatremia  Assessment & Plan  Moderate, Na 127  In setting of IV lasix, holding lasix due to severe sepsis with lactic acidosis  500 cc NS trial today, repeat afternoon BMP  If worsening will get Delmis and Uosm  Repeat AM BMP    Obstructive uropathy- (present on admission)  Assessment & Plan  Urinary retention on bladder scans by straight cath.  Zabala catheter was placed temporarily.  Remove with voiding trials per nursing protocol.  Started on Flomax.  3/19: Zabala catheter was removed.  Voiding trials successful so far.    Rash- (present on admission)  Assessment & Plan  Patient developed pruritic drug related rash   Prn Benadryl, steroids   3/18: Resolving.    Chronic venous insufficiency of lower extremity- (present on admission)  Assessment & Plan  Compression stockings    Elevated troponin- (present on admission)  Assessment & Plan  RESOLVED  Stable. Suspect due to demand ischemia. No need to trend further.    Nonischemic cardiomyopathy (HCC)- (present on admission)  Assessment & Plan  Normal coronaries, 2018.  Tachycardia mediated per cardiology  LVEF improved, based on echo 2/10/2021 LVEF 60%    Chest pain- (present on admission)  Assessment &  Plan  Resolved  EKG and Troponins not consistent with ACS    Mixed hyperlipidemia- (present on admission)  Assessment & Plan  Continue atorvastatin    Obesity- (present on admission)  Assessment & Plan  Body mass index is 37.79 kg/m².  Counseled about diet and exercise       VTE prophylaxis: apixaban

## 2021-03-25 NOTE — DISCHARGE PLANNING
Received Order in Response to Request for Court Ordered Involuntary Admission from the court continuing pt until 4/1 at 0900. Sent copy to ELISA Sutton, scanned copy into media manager.

## 2021-03-25 NOTE — PROGRESS NOTES
Surgery    Status post colostomy in November for colon injury likely sustained last October and ground-level fall where he had associated lower pole spleen injury.  Patient had an abscess with perforation at the splenic flexure necessitating colostomy and prolonged drain.  Since that time he has had significant issues relating the colostomy relating to his personal aversion to it, culminating in suicidal ideations.  He has been readmitted to the hospital on several occasions because of this.  He is currently on legal hold.  We have been asked to reassess patient for colostomy reversal.    /66   Pulse 100   Temp 36.3 °C (97.4 °F) (Temporal)   Resp 18   Ht 1.829 m (6')   Wt (!) 188 kg (413 lb 5.8 oz)   SpO2 93%   BMI 56.06 kg/m²     Sitting up in bed awake and alert  Abdomen soft  Small areas of moist granulation near the old midline incision  Colostomy appears viable and functioning producing stool    Recent Labs     03/23/21  0533 03/24/21  0703 03/25/21  0332   WBC 14.1* 12.7* 13.1*   RBC 4.83 4.75 4.58*   HEMOGLOBIN 13.6* 13.6* 13.0*   HEMATOCRIT 43.1 42.0 40.9*   MCV 89.2 88.4 89.3   MCH 28.2 28.6 28.4   RDW 47.5 47.4 47.5   PLATELETCT 381 331 315   MPV 9.7 9.6 10.2   NEUTSPOLYS 54.20 53.00 54.00   LYMPHOCYTES 33.50 33.10 30.60   MONOCYTES 5.70 5.40 5.60   EOSINOPHILS 5.00 6.90 8.40*   BASOPHILS 0.70 0.70 0.80     Recent Labs     03/23/21  0533 03/24/21  0703 03/25/21  0332   SODIUM 136 133* 138   POTASSIUM 4.0 4.2 4.3   CHLORIDE 101 100 105   CO2 27 28 27   GLUCOSE 113* 116* 120*   BUN 25* 20 20   CREATININE 0.91 0.83 0.93       ASSESSMENT AND PLAN    Patient was discussed with the surgical team as well as colorectal surgery.  At this time there are several barriers to scheduling colostomy reversal, which would be considered an elective procedure.  Primary barrier at this time is patient's psychiatric issues and BMI of 56.  Secondary issues would be his numerous medical comorbidities, including the  need for anticoagulation, which may be directly or indirectly related to his BMI.  He has lost a significant amount of weight in the last few months and his nutritional status is reasonable.  And I think an ongoing multidisciplinary strategy including psychology/psychiatry, dietary, inpatient/outpatient medical support, as well as outpatient supportive care whether that is at home or at a skilled nursing facility would help to bridge the patient to a point where we could significantly reduce his risk of perioperative complications and optimize his chances of a successful colostomy reversal.    I had a long discussion with the patient at the bedside with his nurse present.  I also discussed the case with the hospitalist covering him this week.  He expressed a fair amount of anxiety and disappointment but stated that he does understand the medical/surgical opinion.    Recommend:  Continued medical optimization of his multiple comorbidities  Ongoing dietary and PT/OT to help facilitate increased activity and weight loss  Ongoing psychiatry/psychology support  Close multidisciplinary outpatient follow-up with all teams including surgery.    When the patient has been appropriate benchmarks and I will be reconsidered for reversal of his colostomy

## 2021-03-26 LAB
ALBUMIN SERPL BCP-MCNC: 3.1 G/DL (ref 3.2–4.9)
ALBUMIN/GLOB SERPL: 0.9 G/DL
ALP SERPL-CCNC: 107 U/L (ref 30–99)
ALT SERPL-CCNC: 25 U/L (ref 2–50)
ANION GAP SERPL CALC-SCNC: 9 MMOL/L (ref 7–16)
AST SERPL-CCNC: 19 U/L (ref 12–45)
BASOPHILS # BLD AUTO: 0.9 % (ref 0–1.8)
BASOPHILS # BLD: 0.11 K/UL (ref 0–0.12)
BILIRUB SERPL-MCNC: 0.3 MG/DL (ref 0.1–1.5)
BUN SERPL-MCNC: 22 MG/DL (ref 8–22)
CALCIUM SERPL-MCNC: 9.4 MG/DL (ref 8.5–10.5)
CHLORIDE SERPL-SCNC: 99 MMOL/L (ref 96–112)
CO2 SERPL-SCNC: 25 MMOL/L (ref 20–33)
CREAT SERPL-MCNC: 0.81 MG/DL (ref 0.5–1.4)
EOSINOPHIL # BLD AUTO: 0.97 K/UL (ref 0–0.51)
EOSINOPHIL NFR BLD: 8.1 % (ref 0–6.9)
ERYTHROCYTE [DISTWIDTH] IN BLOOD BY AUTOMATED COUNT: 48.1 FL (ref 35.9–50)
GLOBULIN SER CALC-MCNC: 3.4 G/DL (ref 1.9–3.5)
GLUCOSE BLD-MCNC: 110 MG/DL (ref 65–99)
GLUCOSE BLD-MCNC: 128 MG/DL (ref 65–99)
GLUCOSE BLD-MCNC: 128 MG/DL (ref 65–99)
GLUCOSE BLD-MCNC: 141 MG/DL (ref 65–99)
GLUCOSE SERPL-MCNC: 135 MG/DL (ref 65–99)
HCT VFR BLD AUTO: 43.6 % (ref 42–52)
HGB BLD-MCNC: 14.1 G/DL (ref 14–18)
IMM GRANULOCYTES # BLD AUTO: 0.05 K/UL (ref 0–0.11)
IMM GRANULOCYTES NFR BLD AUTO: 0.4 % (ref 0–0.9)
LYMPHOCYTES # BLD AUTO: 3.94 K/UL (ref 1–4.8)
LYMPHOCYTES NFR BLD: 33 % (ref 22–41)
MCH RBC QN AUTO: 28.6 PG (ref 27–33)
MCHC RBC AUTO-ENTMCNC: 32.3 G/DL (ref 33.7–35.3)
MCV RBC AUTO: 88.4 FL (ref 81.4–97.8)
MONOCYTES # BLD AUTO: 0.58 K/UL (ref 0–0.85)
MONOCYTES NFR BLD AUTO: 4.9 % (ref 0–13.4)
NEUTROPHILS # BLD AUTO: 6.3 K/UL (ref 1.82–7.42)
NEUTROPHILS NFR BLD: 52.7 % (ref 44–72)
NRBC # BLD AUTO: 0 K/UL
NRBC BLD-RTO: 0 /100 WBC
PLATELET # BLD AUTO: 299 K/UL (ref 164–446)
PMV BLD AUTO: 9.6 FL (ref 9–12.9)
POTASSIUM SERPL-SCNC: 4.4 MMOL/L (ref 3.6–5.5)
PROT SERPL-MCNC: 6.5 G/DL (ref 6–8.2)
RBC # BLD AUTO: 4.93 M/UL (ref 4.7–6.1)
SODIUM SERPL-SCNC: 133 MMOL/L (ref 135–145)
WBC # BLD AUTO: 12 K/UL (ref 4.8–10.8)

## 2021-03-26 PROCEDURE — A9270 NON-COVERED ITEM OR SERVICE: HCPCS | Performed by: FAMILY MEDICINE

## 2021-03-26 PROCEDURE — 770001 HCHG ROOM/CARE - MED/SURG/GYN PRIV*

## 2021-03-26 PROCEDURE — 99232 SBSQ HOSP IP/OBS MODERATE 35: CPT | Performed by: STUDENT IN AN ORGANIZED HEALTH CARE EDUCATION/TRAINING PROGRAM

## 2021-03-26 PROCEDURE — A9270 NON-COVERED ITEM OR SERVICE: HCPCS | Performed by: STUDENT IN AN ORGANIZED HEALTH CARE EDUCATION/TRAINING PROGRAM

## 2021-03-26 PROCEDURE — 700102 HCHG RX REV CODE 250 W/ 637 OVERRIDE(OP): Performed by: STUDENT IN AN ORGANIZED HEALTH CARE EDUCATION/TRAINING PROGRAM

## 2021-03-26 PROCEDURE — 700102 HCHG RX REV CODE 250 W/ 637 OVERRIDE(OP): Performed by: FAMILY MEDICINE

## 2021-03-26 PROCEDURE — 700102 HCHG RX REV CODE 250 W/ 637 OVERRIDE(OP): Performed by: HOSPITALIST

## 2021-03-26 PROCEDURE — 85025 COMPLETE CBC W/AUTO DIFF WBC: CPT

## 2021-03-26 PROCEDURE — 80053 COMPREHEN METABOLIC PANEL: CPT

## 2021-03-26 PROCEDURE — 97530 THERAPEUTIC ACTIVITIES: CPT

## 2021-03-26 PROCEDURE — A9270 NON-COVERED ITEM OR SERVICE: HCPCS | Performed by: HOSPITALIST

## 2021-03-26 PROCEDURE — 82962 GLUCOSE BLOOD TEST: CPT

## 2021-03-26 PROCEDURE — 36415 COLL VENOUS BLD VENIPUNCTURE: CPT

## 2021-03-26 PROCEDURE — 99233 SBSQ HOSP IP/OBS HIGH 50: CPT | Performed by: PSYCHIATRY & NEUROLOGY

## 2021-03-26 PROCEDURE — 97116 GAIT TRAINING THERAPY: CPT

## 2021-03-26 RX ADMIN — APIXABAN 5 MG: 5 TABLET, FILM COATED ORAL at 19:51

## 2021-03-26 RX ADMIN — DIGOXIN 125 MCG: 125 TABLET ORAL at 17:17

## 2021-03-26 RX ADMIN — MIDODRINE HYDROCHLORIDE 5 MG: 5 TABLET ORAL at 17:17

## 2021-03-26 RX ADMIN — SIMETHICONE 80 MG: 80 TABLET, CHEWABLE ORAL at 17:16

## 2021-03-26 RX ADMIN — FLUDROCORTISONE ACETATE 0.1 MG: 0.1 TABLET ORAL at 05:15

## 2021-03-26 RX ADMIN — LEVOTHYROXINE SODIUM 112 MCG: 0.11 TABLET ORAL at 05:16

## 2021-03-26 RX ADMIN — METOPROLOL SUCCINATE 25 MG: 25 TABLET, EXTENDED RELEASE ORAL at 05:15

## 2021-03-26 RX ADMIN — APIXABAN 5 MG: 5 TABLET, FILM COATED ORAL at 08:49

## 2021-03-26 RX ADMIN — ZIPRASIDONE HYDROCHLORIDE 60 MG: 60 CAPSULE ORAL at 21:00

## 2021-03-26 RX ADMIN — PAROXETINE HYDROCHLORIDE 20 MG: 20 TABLET, FILM COATED ORAL at 08:49

## 2021-03-26 RX ADMIN — MIDODRINE HYDROCHLORIDE 5 MG: 5 TABLET ORAL at 08:48

## 2021-03-26 RX ADMIN — ATORVASTATIN CALCIUM 40 MG: 40 TABLET, FILM COATED ORAL at 17:16

## 2021-03-26 RX ADMIN — OMEPRAZOLE 20 MG: 20 CAPSULE, DELAYED RELEASE ORAL at 05:16

## 2021-03-26 RX ADMIN — DOCUSATE SODIUM 50 MG AND SENNOSIDES 8.6 MG 2 TABLET: 8.6; 5 TABLET, FILM COATED ORAL at 19:51

## 2021-03-26 RX ADMIN — CLONAZEPAM 1 MG: 1 TABLET ORAL at 17:17

## 2021-03-26 RX ADMIN — INSULIN GLARGINE 5 UNITS: 100 INJECTION, SOLUTION SUBCUTANEOUS at 05:16

## 2021-03-26 RX ADMIN — TAMSULOSIN HYDROCHLORIDE 0.4 MG: 0.4 CAPSULE ORAL at 05:16

## 2021-03-26 RX ADMIN — POTASSIUM CHLORIDE 20 MEQ: 20 TABLET, EXTENDED RELEASE ORAL at 05:16

## 2021-03-26 RX ADMIN — CLONAZEPAM 0.5 MG: 0.5 TABLET ORAL at 08:49

## 2021-03-26 RX ADMIN — MIDODRINE HYDROCHLORIDE 5 MG: 5 TABLET ORAL at 13:00

## 2021-03-26 RX ADMIN — SIMETHICONE 80 MG: 80 TABLET, CHEWABLE ORAL at 05:16

## 2021-03-26 RX ADMIN — ZIPRASIDONE HYDROCHLORIDE 60 MG: 60 CAPSULE ORAL at 08:49

## 2021-03-26 ASSESSMENT — COGNITIVE AND FUNCTIONAL STATUS - GENERAL
MOVING FROM LYING ON BACK TO SITTING ON SIDE OF FLAT BED: A LITTLE
MOVING TO AND FROM BED TO CHAIR: UNABLE
TURNING FROM BACK TO SIDE WHILE IN FLAT BAD: A LITTLE
CLIMB 3 TO 5 STEPS WITH RAILING: A LOT
SUGGESTED CMS G CODE MODIFIER MOBILITY: CK
STANDING UP FROM CHAIR USING ARMS: A LITTLE
WALKING IN HOSPITAL ROOM: A LITTLE
MOBILITY SCORE: 15

## 2021-03-26 ASSESSMENT — ENCOUNTER SYMPTOMS
NAUSEA: 0
FEVER: 0
COUGH: 0
FOCAL WEAKNESS: 0
HEADACHES: 0
EYES NEGATIVE: 1
ABDOMINAL PAIN: 0
HALLUCINATIONS: 1
VOMITING: 0
DEPRESSION: 1
CHILLS: 0
MYALGIAS: 0

## 2021-03-26 ASSESSMENT — GAIT ASSESSMENTS
DISTANCE (FEET): 125
GAIT LEVEL OF ASSIST: SUPERVISED
ASSISTIVE DEVICE: 4 WHEEL WALKER
DEVIATION: BRADYKINETIC;SHUFFLED GAIT

## 2021-03-26 ASSESSMENT — PAIN DESCRIPTION - PAIN TYPE: TYPE: ACUTE PAIN

## 2021-03-26 NOTE — CARE PLAN
Problem: Communication  Goal: The ability to communicate needs accurately and effectively will improve  Outcome: PROGRESSING AS EXPECTED     Problem: Safety  Goal: Will remain free from injury  Outcome: PROGRESSING AS EXPECTED  Note: Continued 1:1 sitter for patient at bedside.     Problem: Bowel/Gastric:  Goal: Normal bowel function is maintained or improved  Outcome: PROGRESSING AS EXPECTED     Problem: Knowledge Deficit  Goal: Knowledge of disease process/condition, treatment plan, diagnostic tests, and medications will improve  Outcome: PROGRESSING AS EXPECTED     Problem: Discharge Barriers/Planning  Goal: Patient's continuum of care needs will be met  Outcome: PROGRESSING AS EXPECTED     Problem: Psychosocial Needs:  Goal: Level of anxiety will decrease  Outcome: PROGRESSING AS EXPECTED

## 2021-03-26 NOTE — CARE PLAN
Problem: Infection  Goal: Will remain free from infection  Outcome: PROGRESSING AS EXPECTED  Note: Patient will remain free from signs and symptoms of infection throughout shift.     Problem: Potential for Self Harm  Goal: No active self-harm  Outcome: PROGRESSING AS EXPECTED  Note: Patient will remain free from self harm throughout shift.     Problem: Ineffective Coping  Goal: No active self-harm  Outcome: PROGRESSING AS EXPECTED  Note: Patient will remain free from self harm throughout shift.

## 2021-03-26 NOTE — PROGRESS NOTES
Hospital Medicine Daily Progress Note    Date of Service  3/26/2021    Chief Complaint  56 y.o. male admitted 2/9/2021 with syncope    Hospital Course  This is a 56 year-old man with a past medical history significant for atrial fibrillation, secondary hypercoagulable state. Diabetes mellitus with glyco of 5.2 complicated with diabetic neuropathy, history of congestive systolic heart failure, dyslipidemia presented to the ER on 2/9/2021 after he had a syncopal episode.    Patient does have a history of orthostatic hypotension and was started on fludrocortisone.  For the event, patient complained of significant dizziness.  Upon presentation in ER, patient was hypotensive along with bradycardia thus cardiology was consulted and evaluated and started to stopping all AV sanket blocking agent.  During the stay in the hospital, patient orthostatic vital signs continue to remain positive-was a started on midodrine 5 mg every 6 hours    Evaluated in the past by EP and neurology recurrent syncopal episode.  Currently patient is on digoxin 125 cc p.o. daily, along with Eliquis 5 mg p.o. twice daily for atrial fibrillation.    Echocardiogram was obtained which showed EF of 60%, improved from previous echocardiogram obtained on 11/2020.    Patient has h/o spleen injury, splenic flexure perforation in September 2020. S/p colostomy and Interiano's pouch in November 2020. He was at home taking care of his colostomy, however he has become depressed, overwhelmed and can no longer take care of his colostomy independently.    Psych continue to follow the patient, psychiatric medication as per psych recommendations.    Developed hypersensitivity rash and itching due to zosyn.   Switched to ciprofloxacin and treated with benadryl.  BCx resulted as pan-susceptible Klebsiella pneumo.   Discussed with pharmacist Ashley and decided on bactrim.      Interval Problem Update  I evaluated and examined the patient at the bedside.  Labs and imaging  were reviewed. Care plan was discussed with the patient and bedside nurse.    Pt still reported depression, suicidal ideation, hallucination with voice to kill himself.    Per surgery Dr. Desouza, high risk of ostomy reversal given his complexities. Plan for close multidisciplinary outpatient follow-up for surgery when  patient overall medical conditions more optimized.    Legal hold extended 4/1, need to meet court physician next week for further decision.    one-to-one sitter instead of the telemetry sitter.     PT OT recommended SNF, however pt cannot go SNF with legal hold, and he is not strong enough to go inpt pschy, Dc will be difficult.       Consultants/Specialty  Psychiatry- SI  Cardiology - Hypotension and Afib  General surgery    Code Status  Full Code    Disposition  Difficult discharge, PT OT recommended SNF, however pt cannot  go SNF with legal hold, and he is not strong enough to go inpt pschy, Dc will be difficult.     Review of Systems  Review of Systems   Constitutional: Positive for malaise/fatigue. Negative for chills and fever.   HENT: Negative.    Eyes: Negative.    Respiratory: Negative for cough.    Cardiovascular: Negative for chest pain.   Gastrointestinal: Negative for abdominal pain, nausea and vomiting.   Genitourinary: Negative for dysuria.   Musculoskeletal: Negative for myalgias.   Skin: Negative for rash.   Neurological: Negative for focal weakness and headaches.   Psychiatric/Behavioral: Positive for depression, hallucinations and suicidal ideas.        Physical Exam  Temp:  [36.1 °C (97 °F)-36.9 °C (98.4 °F)] 36.5 °C (97.7 °F)  Pulse:  [56-80] 63  Resp:  [18-19] 19  BP: (106-142)/(57-71) 129/71  SpO2:  [94 %-97 %] 97 %    Physical Exam  Vitals and nursing note reviewed.   Constitutional:       Appearance: He is ill-appearing.   HENT:      Head: Normocephalic.      Mouth/Throat:      Mouth: Mucous membranes are moist.      Pharynx: Oropharynx is clear.   Eyes:      Pupils: Pupils  are equal, round, and reactive to light.   Cardiovascular:      Rate and Rhythm: Normal rate and regular rhythm.      Heart sounds: Normal heart sounds.   Pulmonary:      Effort: Pulmonary effort is normal. No respiratory distress.      Breath sounds: Normal breath sounds. No wheezing.   Abdominal:      General: Abdomen is flat. Bowel sounds are normal.      Comments: Colostoma in place in left abdomen   Musculoskeletal:         General: Normal range of motion.      Cervical back: Normal range of motion.   Skin:     General: Skin is warm.   Neurological:      General: No focal deficit present.      Mental Status: He is alert and oriented to person, place, and time.   Psychiatric:         Mood and Affect: Mood is depressed.         Thought Content: Thought content includes suicidal ideation.         Fluids    Intake/Output Summary (Last 24 hours) at 3/26/2021 1405  Last data filed at 3/26/2021 0600  Gross per 24 hour   Intake 680 ml   Output 700 ml   Net -20 ml       Laboratory  Recent Labs     03/24/21  0703 03/25/21  0332 03/26/21  0607   WBC 12.7* 13.1* 12.0*   RBC 4.75 4.58* 4.93   HEMOGLOBIN 13.6* 13.0* 14.1   HEMATOCRIT 42.0 40.9* 43.6   MCV 88.4 89.3 88.4   MCH 28.6 28.4 28.6   MCHC 32.4* 31.8* 32.3*   RDW 47.4 47.5 48.1   PLATELETCT 331 315 299   MPV 9.6 10.2 9.6     Recent Labs     03/24/21  0703 03/25/21  0332 03/26/21  0607   SODIUM 133* 138 133*   POTASSIUM 4.2 4.3 4.4   CHLORIDE 100 105 99   CO2 28 27 25   GLUCOSE 116* 120* 135*   BUN 20 20 22   CREATININE 0.83 0.93 0.81   CALCIUM 8.5 8.8 9.4                   Imaging  [id number] VIEW   Final Result         No significant interval change.      DX-CHEST-LIMITED (1 VIEW)   Final Result         Diffuse interstitial prominence could relate to mild pulmonary edema or atypical infection      [id number] VIEW   Final Result      Increased colonic gas without definite bowel obstruction.      US-RENAL   Final Result      No evidence of hydronephrosis.       Lobulated kidneys bilaterally.      CT-ABDOMEN-PELVIS WITH   Final Result      1.  There is no evidence of small bowel obstruction.   2.  There is a left lower quadrant ostomy.   3.  There is fluid distention of the colon distal to the surgical material in the left mid descending colon extending all the way to the rectum. There is no pneumatosis or free air.   4.  There is cholelithiasis without biliary dilatation.   5.  There has been interval removal of the drainage catheter anterior to the spleen with minimal hypodense area in the anterior spleen again noted. There is no new fluid collection.      IR-US GUIDED PIV   Final Result    Ultrasound-guided PERIPHERAL IV INSERTION performed by    qualified nursing staff as above.      EC-ECHOCARDIOGRAM COMPLETE W/O CONT   Final Result      DX-LUMBAR SPINE-2 OR 3 VIEWS   Final Result      Moderate compression deformity of T11 is new compared to 2018.      Mild wedge deformity of T12 is unchanged.      Degenerative changes including facet arthropathy.      Mild retrolisthesis of L5 on S1 and L3 on L4.              Assessment/Plan  * Suicidal ideation- (present on admission)  Assessment & Plan  Has history of MDD, recurrent with psychotic features with active SI w/ plan.  Psychiatry consulted, continued legal hold  To be discharged to psychiatric program pending medical clearance and when able to self-care for ostomy    Legal hold extended 4/1, need to meet court physician next week for further decision    Cardiogenic pulmonary edema (HCC)  Assessment & Plan  CXR demonstrates pulmonary vascular congestion / edema  NT-BNP 7828  Due to IV boluses for hypotension and N/V in setting of HFrEF with recovered EF  IV lasix tolerated with improvement in BP and Cr, discontinued 3/14 for severe sepsis and lactic acidosis  3/16: Currently saturating well on room air.    Major depressive disorder, recurrent, severe with psychotic features (HCC)- (present on admission)  Assessment &  Plan  Psychiatry and Psychology following  Continue paroxetine, ziprasidone, and clonazepam    Secondary adrenal insufficiency (HCC)- (present on admission)  Assessment & Plan  Cosyntropin stimulation test reviewed with only a increase in his cortisol going from 14.5 then up to 21.3 after cosyntropin given  Renin 0.1, ACTH 3.4 and slight increase with cosyntropin suggesting secondary AI  Random cortisol 9.9  Needs outpatient endocrinology follow up.  Repeat adrenal function after discharge  Repeat AM BMP    Janna restarted 3/14 at 0.2 mg daily (2x dose) due to severe sepsis from GNR bacteremia in order to prevent adrenal crisis    Sepsis with acute renal failure without septic shock (HCC)  Assessment & Plan  This is Severe Sepsis Not present on admission  SIRS criteria identified on my evaluation include: Tachycardia, with heart rate greater than 90 BPM and Leukocytosis, with WBC greater than 12,000  Source of infection is GNR bacteremia  Clinical indicators of end organ dysfunction include Systolic blood pressure (SBP) <90 mmHg or mean arterial pressure <65 mmHg and Lactate >2 mmol/L (18.0 mg/dL)  Sepsis protocol initiated  Fluid resuscitation not ordered due to pulmonary edema seen on CXR due to HFrEF  IV antibiotics as appropriate for source of sepsis - Zosyn    BCx x2 (3/13) +GNR in one bottle, awaiting speciation and susceptbilities  UCx added to UA (3/13) that showed pyuria and bacteruria  Cdiff negative (3/12)  Prior microbiology date reviewed, h/o enterococcus and MRSA but currently not positive for GPCs    Reassessment: I have reassessed the patient's hemodynamic status  End organ dysfunction include(s):  Acute kidney failure (SRUTHI)  Cr and BP improved after diuresis yesterday for cardiogenic pulmonary edema  Lactic Acid 2.8, improved with holding diuresis    Orthostatic hypotension- (present on admission)  Assessment & Plan  Multifactorial including adrenal insufficiency, medications (beta blocker, CCB,  tamsulosin), diabetic autonomic dysfunction, and venous insufficiency  EF is noted to be 60%, significant improvement from prior.  Per cardiology, no further cardiac work-up needed  Follow-up with cardiology as an outpatient.  Continue midodrine, PRN IVF    Bacteremia  Assessment & Plan  Due to klebsiella bacteremia  BCx: 1 of 2 bottles +Klebsiella  Cdiff negative  UA: Bacteruria and pyuria  UCx: In progress  CXR with pulmonary edema but no infiltrates  3/16: Currently on Ancef.  3/19: Switch him to Bactrim to complete a 10-day course.    Abdominal bloating  Assessment & Plan  Improved  Had increasing gas output from ostomy  Intermittent episodes of abdominal pain and N/V  CT 3/4 did not demonstrate obstruction nor pneumotosis  KUB 3/12 for recurrent symptoms demonstrated increased gas but no signs of SBO  Simethicone scheduled 80 mg TID  If uncontrolled, would confer with general surgeon about further imaging and discuss empiric treatment for SIBO    Normal anion gap metabolic acidosis  Assessment & Plan  Resolved  Due to bicarbonate loss via ostomy  Repeat AM BMP    Syncope due to orthostatic hypotension- (present on admission)  Assessment & Plan  Likely secondary to orthostatic hypotension  Echo normal with 60% ejection fraction, improved from prior    Paroxysmal atrial fibrillation (HCC)- (present on admission)  Assessment & Plan  CHADSVASC 3 (HTN, CHF, DM).   TTE Feb 2021: EF 60%  Continue metoprolol, digoxin, and eliquis  Discontinued telemetry    Chronic heart failure with preserved ejection fraction (HCC)- (present on admission)  Assessment & Plan  Decompensated based on peripheral and pulmonary edema, mild hyponatremia  IV diuresis initiated 3/13 for pulmonary edema, held 3/14 in setting of lactic acidosis from severe sepsis  Recovered EF 60% on repeat echo.  Continue metoprolol XL  Lisinopril held per Cardiology recommendations due to hypotension    Diabetes mellitus type 2, insulin dependent (HCC)-  (present on admission)  Assessment & Plan  A1c 5.2  Diabetic diet  Restarting accuchecks and SSI due to GNR sepsis  BG goal <200 with bacteremia  Continue atorvastatin    Colostomy present (HCC)- (present on admission)  Assessment & Plan  History of fall at home Nov 2020 on left side with perforated colon and splenic fluid collection/hematoma s/p segmental colectomy, colostomy, mobilization of the splenic flexure, wound vac, and I&D Nov 2020.   Ostomy care consulted, but he was non-participatory  He is engaging in ostomy care with his bedside RNs, encouraged to be observed but do it independently  Patient must be independently able to care for his ostomy before inpatient psychiatry will accept him    Per surgery Dr. Desouza, high risk of ostomy reversal given his complexities. Plan for close multidisciplinary outpatient follow-up for surgery when  patient overall medical conditions more optimized.      Anxiety and depression- (present on admission)  Assessment & Plan  On Legal Hold until 3/18  Psychiatry consulted  Continue paroxetine, ziprasidone, and clonazepam  Would likely benefit from CBT or DBT  3/16: Psychiatry recommending transferring to inpatient psychiatric facility once medically cleared.  However patient has to perform self ostomy care which has been challenging.  Management and  working on best safe discharge plan.    Hypothyroidism- (present on admission)  Assessment & Plan  TSH wnl Feb 2021  Continue home Levothyroxine    Acute renal failure superimposed on stage 3 chronic kidney disease (HCC)- (present on admission)  Assessment & Plan  CKD 3  Had improved with increased midodrine and diuresis, but worsened this morning in setting of holding diuresis for hypotension and lactic acidosis  500 cc NS bolus today, repeat BMP this afternoon  Developed AHRF with pulmonary edema from 1 L bolus, judicious IVF  Diuresis held, continue midodrine  3/16: Discontinue Bactrim as it will worsen kidney  functions. Slowly improving.  Avoid nephrotoxins.  Renally dose all medications.  3/18: Back to baseline.    Debility- (present on admission)  Assessment & Plan  Likely due to depression  PT/OT evaluated, no further skilled needs    Essential hypertension- (present on admission)  Assessment & Plan  Resolved, has been hypotensive this admission in setting of secondary adrenal insufficiency  Continue midodrine    Lower limb amputation, great toe (HCC)- (present on admission)  Assessment & Plan  s/p right great toe amputation 2018    Hypomagnesemia  Assessment & Plan  Mg WNL today  Repeat AM BMP/Mg    Hypokalemia  Assessment & Plan  K 3.2  Lasix held yesterday due to hypotension and lactic acidosis  Continue Kdur 20 daily  Discussed with pharmacy, no additional K today due to risk of hyperkalemia with bactrim  Mg WNL  Repeat AM BMP/Mg    Hyponatremia  Assessment & Plan  Moderate, Na 127  In setting of IV lasix, holding lasix due to severe sepsis with lactic acidosis  500 cc NS trial today, repeat afternoon BMP  If worsening will get Delmis and Uosm  Repeat AM BMP    Obstructive uropathy- (present on admission)  Assessment & Plan  Urinary retention on bladder scans by straight cath.  Zabala catheter was placed temporarily.  Remove with voiding trials per nursing protocol.  Started on Flomax.  3/19: Zabala catheter was removed.  Voiding trials successful so far.    Rash- (present on admission)  Assessment & Plan  Patient developed pruritic drug related rash   Prn Benadryl, steroids   3/18: Resolving.    Chronic venous insufficiency of lower extremity- (present on admission)  Assessment & Plan  Compression stockings    Elevated troponin- (present on admission)  Assessment & Plan  RESOLVED  Stable. Suspect due to demand ischemia. No need to trend further.    Nonischemic cardiomyopathy (HCC)- (present on admission)  Assessment & Plan  Normal coronaries, 2018.  Tachycardia mediated per cardiology  LVEF improved, based on echo  2/10/2021 LVEF 60%    Chest pain- (present on admission)  Assessment & Plan  Resolved  EKG and Troponins not consistent with ACS    Mixed hyperlipidemia- (present on admission)  Assessment & Plan  Continue atorvastatin    Obesity- (present on admission)  Assessment & Plan  Body mass index is 37.79 kg/m².  Counseled about diet and exercise       VTE prophylaxis: apixaban

## 2021-03-26 NOTE — DOCUMENTATION QUERY
[facility]                                                                       Query Response Note      PATIENT:               EMELY FLORES  ACCT #:                  [id number]  MRN:                     [id number]  :                      1965  ADMIT DATE:       2021 7:30 AM  DISCH DATE:          RESPONDING  PROVIDER #:        [id number]           QUERY TEXT:    Pressure ulcer is documented by the Wound Care RN in notes dated 3/18 and 3/23.     Please specify if you:    NOTE:  If an appropriate response is not listed below, please respond with a new note.      The patient's Clinical Indicators include:  3/18 & 3/23 Wound Team Notes:   1) Pressure Injury Thigh Posterior Right POA - No, Stage -DTPI - evolving to stage 2  Treatment: Wound team consult; site care; offloading  Risk Factors: Acute/chronic illness; pressure from catheter tubing    Thank You,  Franny Ruiz RN  Clinical    Connect via [employer]  Options provided:   -- Agree with Wound Care RN assessment   -- Disagree with Wound Care RN assessment   -- Unable to determine      Query created by: Franny Ruiz on 3/26/2021 1:40 PM    RESPONSE TEXT:    Agree with Wound Care RN assessment          Electronically signed by:  TJ RAMIREZ MD 3/26/2021 3:51 PM

## 2021-03-26 NOTE — THERAPY
Physical Therapy   Daily Treatment     Patient Name: Sebastián Castro  Age:  56 y.o., Sex:  male  Medical Record #: [id number]  Today's Date: 3/26/2021     Precautions: Fall Risk    Assessment    Pt continues to progress with therapy. Met his transfer and gait goals. Able to ambulate hallway distances with 4WW. Continues to require assist with bed mobility, however likely is due to style of bed. Pt only uses therapist as a bed rail to pull up on to get himself to the edge. Pt's remaining goal is a stair one, as that was to enter his prior home, however based on events this hospitalization and patient's known history of multiple admissions do not recommend pt return to living alone. Recommend some sort of 24/7 care for increased social support, potentially group home. Acute PT to follow for 1-2 more treatments but is reaching a plateau.     Plan    Continue current treatment plan.    DC Equipment Recommendations: None  Discharge Recommendations: Other -(24/7 care for psych needs)           Objective       03/26/21 1111   Cognition    Level of Consciousness Alert   Comments pleasant and motivated   Balance   Sitting Balance (Static) Fair +   Sitting Balance (Dynamic) Fair   Standing Balance (Static) Fair   Standing Balance (Dynamic) Fair   Weight Shift Sitting Fair   Weight Shift Standing Fair   Comments FWW   Gait Analysis   Gait Level Of Assist Supervised   Assistive Device 4 Wheel Walker   Distance (Feet) 125   # of Times Distance was Traveled 1   Deviation Bradykinetic;Shuffled Gait   Weight Bearing Status no restrictions   Bed Mobility    Supine to Sit Minimal Assist   Sit to Supine Supervised   Scooting Supervised   Functional Mobility   Sit to Stand Supervised   Short Term Goals    Short Term Goal # 1 Pt will perform supine<->sit with supv within 6 visits in order to return home   Goal Outcome # 1 goal not met   Short Term Goal # 2 Pt will transfer bed<->chair with 4WW with supv within 6 visits in order to return  home   Goal Outcome # 2 Goal met   Short Term Goal # 3 Pt will ambulate 100' with supv within 6 visits in order to return home   Goal Outcome # 3 Goal met   Short Term Goal # 4 Pt will ascend/descend 5 steps with supv within 6 visits in order to enter/exit home   Goal Outcome # 4 Goal not met

## 2021-03-27 LAB
ALBUMIN SERPL BCP-MCNC: 3.2 G/DL (ref 3.2–4.9)
ALBUMIN/GLOB SERPL: 1 G/DL
ALP SERPL-CCNC: 96 U/L (ref 30–99)
ALT SERPL-CCNC: 23 U/L (ref 2–50)
ANION GAP SERPL CALC-SCNC: 9 MMOL/L (ref 7–16)
AST SERPL-CCNC: 20 U/L (ref 12–45)
BASOPHILS # BLD AUTO: 1 % (ref 0–1.8)
BASOPHILS # BLD: 0.11 K/UL (ref 0–0.12)
BILIRUB SERPL-MCNC: 0.5 MG/DL (ref 0.1–1.5)
BUN SERPL-MCNC: 21 MG/DL (ref 8–22)
CALCIUM SERPL-MCNC: 9.2 MG/DL (ref 8.5–10.5)
CHLORIDE SERPL-SCNC: 98 MMOL/L (ref 96–112)
CO2 SERPL-SCNC: 28 MMOL/L (ref 20–33)
CREAT SERPL-MCNC: 0.87 MG/DL (ref 0.5–1.4)
EOSINOPHIL # BLD AUTO: 0.72 K/UL (ref 0–0.51)
EOSINOPHIL NFR BLD: 6.8 % (ref 0–6.9)
ERYTHROCYTE [DISTWIDTH] IN BLOOD BY AUTOMATED COUNT: 48 FL (ref 35.9–50)
GLOBULIN SER CALC-MCNC: 3.3 G/DL (ref 1.9–3.5)
GLUCOSE BLD-MCNC: 100 MG/DL (ref 65–99)
GLUCOSE BLD-MCNC: 103 MG/DL (ref 65–99)
GLUCOSE BLD-MCNC: 107 MG/DL (ref 65–99)
GLUCOSE BLD-MCNC: 119 MG/DL (ref 65–99)
GLUCOSE SERPL-MCNC: 107 MG/DL (ref 65–99)
HCT VFR BLD AUTO: 43.8 % (ref 42–52)
HGB BLD-MCNC: 14.2 G/DL (ref 14–18)
IMM GRANULOCYTES # BLD AUTO: 0.04 K/UL (ref 0–0.11)
IMM GRANULOCYTES NFR BLD AUTO: 0.4 % (ref 0–0.9)
LYMPHOCYTES # BLD AUTO: 3.47 K/UL (ref 1–4.8)
LYMPHOCYTES NFR BLD: 33 % (ref 22–41)
MCH RBC QN AUTO: 28.8 PG (ref 27–33)
MCHC RBC AUTO-ENTMCNC: 32.4 G/DL (ref 33.7–35.3)
MCV RBC AUTO: 88.8 FL (ref 81.4–97.8)
MONOCYTES # BLD AUTO: 0.62 K/UL (ref 0–0.85)
MONOCYTES NFR BLD AUTO: 5.9 % (ref 0–13.4)
NEUTROPHILS # BLD AUTO: 5.56 K/UL (ref 1.82–7.42)
NEUTROPHILS NFR BLD: 52.9 % (ref 44–72)
NRBC # BLD AUTO: 0 K/UL
NRBC BLD-RTO: 0 /100 WBC
PLATELET # BLD AUTO: 277 K/UL (ref 164–446)
PMV BLD AUTO: 9.8 FL (ref 9–12.9)
POTASSIUM SERPL-SCNC: 4.4 MMOL/L (ref 3.6–5.5)
PROT SERPL-MCNC: 6.5 G/DL (ref 6–8.2)
RBC # BLD AUTO: 4.93 M/UL (ref 4.7–6.1)
SODIUM SERPL-SCNC: 135 MMOL/L (ref 135–145)
WBC # BLD AUTO: 10.5 K/UL (ref 4.8–10.8)

## 2021-03-27 PROCEDURE — 700102 HCHG RX REV CODE 250 W/ 637 OVERRIDE(OP): Performed by: STUDENT IN AN ORGANIZED HEALTH CARE EDUCATION/TRAINING PROGRAM

## 2021-03-27 PROCEDURE — A9270 NON-COVERED ITEM OR SERVICE: HCPCS | Performed by: STUDENT IN AN ORGANIZED HEALTH CARE EDUCATION/TRAINING PROGRAM

## 2021-03-27 PROCEDURE — 80053 COMPREHEN METABOLIC PANEL: CPT

## 2021-03-27 PROCEDURE — 700102 HCHG RX REV CODE 250 W/ 637 OVERRIDE(OP): Performed by: FAMILY MEDICINE

## 2021-03-27 PROCEDURE — 99232 SBSQ HOSP IP/OBS MODERATE 35: CPT | Performed by: PSYCHIATRY & NEUROLOGY

## 2021-03-27 PROCEDURE — A9270 NON-COVERED ITEM OR SERVICE: HCPCS | Performed by: FAMILY MEDICINE

## 2021-03-27 PROCEDURE — A9270 NON-COVERED ITEM OR SERVICE: HCPCS | Performed by: HOSPITALIST

## 2021-03-27 PROCEDURE — 700102 HCHG RX REV CODE 250 W/ 637 OVERRIDE(OP): Performed by: HOSPITALIST

## 2021-03-27 PROCEDURE — 99232 SBSQ HOSP IP/OBS MODERATE 35: CPT | Performed by: STUDENT IN AN ORGANIZED HEALTH CARE EDUCATION/TRAINING PROGRAM

## 2021-03-27 PROCEDURE — 85025 COMPLETE CBC W/AUTO DIFF WBC: CPT

## 2021-03-27 PROCEDURE — 82962 GLUCOSE BLOOD TEST: CPT | Mod: 91

## 2021-03-27 PROCEDURE — 36415 COLL VENOUS BLD VENIPUNCTURE: CPT

## 2021-03-27 PROCEDURE — 770001 HCHG ROOM/CARE - MED/SURG/GYN PRIV*

## 2021-03-27 RX ADMIN — ZIPRASIDONE HYDROCHLORIDE 60 MG: 60 CAPSULE ORAL at 08:41

## 2021-03-27 RX ADMIN — SIMETHICONE 80 MG: 80 TABLET, CHEWABLE ORAL at 17:41

## 2021-03-27 RX ADMIN — ZIPRASIDONE HYDROCHLORIDE 60 MG: 60 CAPSULE ORAL at 19:35

## 2021-03-27 RX ADMIN — ATORVASTATIN CALCIUM 40 MG: 40 TABLET, FILM COATED ORAL at 17:41

## 2021-03-27 RX ADMIN — DOCUSATE SODIUM 50 MG AND SENNOSIDES 8.6 MG 2 TABLET: 8.6; 5 TABLET, FILM COATED ORAL at 19:35

## 2021-03-27 RX ADMIN — CLONAZEPAM 0.5 MG: 0.5 TABLET ORAL at 08:42

## 2021-03-27 RX ADMIN — MIDODRINE HYDROCHLORIDE 5 MG: 5 TABLET ORAL at 17:41

## 2021-03-27 RX ADMIN — LEVOTHYROXINE SODIUM 112 MCG: 0.11 TABLET ORAL at 04:13

## 2021-03-27 RX ADMIN — FLUDROCORTISONE ACETATE 0.1 MG: 0.1 TABLET ORAL at 04:12

## 2021-03-27 RX ADMIN — SIMETHICONE 80 MG: 80 TABLET, CHEWABLE ORAL at 12:45

## 2021-03-27 RX ADMIN — MIDODRINE HYDROCHLORIDE 5 MG: 5 TABLET ORAL at 08:41

## 2021-03-27 RX ADMIN — PAROXETINE HYDROCHLORIDE 20 MG: 20 TABLET, FILM COATED ORAL at 08:41

## 2021-03-27 RX ADMIN — APIXABAN 5 MG: 5 TABLET, FILM COATED ORAL at 08:42

## 2021-03-27 RX ADMIN — POTASSIUM CHLORIDE 20 MEQ: 20 TABLET, EXTENDED RELEASE ORAL at 04:12

## 2021-03-27 RX ADMIN — DIGOXIN 125 MCG: 125 TABLET ORAL at 17:41

## 2021-03-27 RX ADMIN — SIMETHICONE 80 MG: 80 TABLET, CHEWABLE ORAL at 04:13

## 2021-03-27 RX ADMIN — TAMSULOSIN HYDROCHLORIDE 0.4 MG: 0.4 CAPSULE ORAL at 04:12

## 2021-03-27 RX ADMIN — APIXABAN 5 MG: 5 TABLET, FILM COATED ORAL at 19:35

## 2021-03-27 RX ADMIN — DOCUSATE SODIUM 50 MG AND SENNOSIDES 8.6 MG 2 TABLET: 8.6; 5 TABLET, FILM COATED ORAL at 08:42

## 2021-03-27 RX ADMIN — METOPROLOL SUCCINATE 25 MG: 25 TABLET, EXTENDED RELEASE ORAL at 04:13

## 2021-03-27 RX ADMIN — MIDODRINE HYDROCHLORIDE 5 MG: 5 TABLET ORAL at 12:45

## 2021-03-27 RX ADMIN — OMEPRAZOLE 20 MG: 20 CAPSULE, DELAYED RELEASE ORAL at 04:12

## 2021-03-27 RX ADMIN — INSULIN GLARGINE 5 UNITS: 100 INJECTION, SOLUTION SUBCUTANEOUS at 04:13

## 2021-03-27 RX ADMIN — CLONAZEPAM 1 MG: 1 TABLET ORAL at 17:41

## 2021-03-27 ASSESSMENT — ENCOUNTER SYMPTOMS
MYALGIAS: 0
DEPRESSION: 1
COUGH: 0
EYES NEGATIVE: 1
VOMITING: 0
CHILLS: 0
FEVER: 0
ABDOMINAL PAIN: 0
FOCAL WEAKNESS: 0
HALLUCINATIONS: 1
HEADACHES: 0
NAUSEA: 0

## 2021-03-27 ASSESSMENT — PAIN DESCRIPTION - PAIN TYPE: TYPE: ACUTE PAIN

## 2021-03-27 NOTE — CARE PLAN
Problem: Bowel/Gastric:  Goal: Normal bowel function is maintained or improved  Outcome: PROGRESSING AS EXPECTED     Problem: Psychosocial Needs:  Goal: Level of anxiety will decrease  Outcome: PROGRESSING AS EXPECTED     Problem: Pain Management  Goal: Pain level will decrease to patient's comfort goal  Outcome: PROGRESSING AS EXPECTED  Note: No complaints of pain this shift. Will continue to monitor     Problem: Skin Integrity  Goal: Risk for impaired skin integrity will decrease  Outcome: PROGRESSING AS EXPECTED  Note: Dressing change done per orders

## 2021-03-27 NOTE — CONSULTS
"PSYCHIATRIC FOLLOW-UP:(established)  *Reason for admission: Syncope, hypotension. Being followed for SI.    *Legal Hold Status: on legal hold                *HPI: pt is again SI: he was \"told\" they were going to reanastomose his ostomy then told they couldn't until he lost 70 lbs. Asked him about the difference between his ability to care for his ostomy before this adminision and now. Explained the process of changing it and says he does not know why it seems more difficult to him but it does. Asked if perhaps he felt more in need of being cared for, was more afraid to have to go back to doing it on his on. Pt did not think this was case. His sleep is down as well.     *Psychiatric Examination:   Vitals:   Vitals:    03/26/21 0830 03/26/21 1634 03/26/21 2100 03/27/21 0451   BP: 129/71 128/63 134/79 122/65   Pulse: 63 74 76 62   Resp: 19 19 18 18   Temp: 36.5 °C (97.7 °F) 36.8 °C (98.2 °F) 36.4 °C (97.6 °F) 36.1 °C (97 °F)   TempSrc: Temporal Temporal Temporal Temporal   SpO2: 97% 99% 94% 95%   Weight:       Height:         General Appearance:  good eye contact  Abnormal Movements: none   Gait and Posture: not observed  Speech: within normal limits  Thought Process: normal rate  Associations:   linear  Abnormal or Psychotic Thoughts: none  Judgement and Insight: limited  Orientation: grossly intact  Recent and Remote Memory: grossly intact  Attention Span and Concentration: intact  Language:fluent  Fund of Knowledge: not tested  Mood and Affect: depressed, affect more euthymic in appearance  SI/HI:  suicidal - yes and homicidal - no     *ASSESSMENT/RECOMENDATIONS:  1. 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 Medical  -ostomy  -HTN  -pafib  -CHF  -DM2  -hypothyroidism  -orthostatic hypotension  -SRUTHI  -secondary adrenal insufficiency  -leukocytosis  -non ischemic cardiomyopathy     Legal hold: extended  Observation status: 1:1 " sitter   Privelages: unchanged.    Other: pt needs nails cut and trimmed.      *Will Follow

## 2021-03-27 NOTE — CONSULTS
"PSYCHIATRIC FOLLOW-UP:(established)  *Reason for admission:  Syncope, hypotension. Being followed for SI   *Legal Hold Status: extended                *HPI: sleeping. Tells me he sleeps during the day and not at night. We discussed this, that he needs to stay awake during the day to promote sleep at HS. Pt also told me that a year + ago he tried to cut his throat requiring stitches and that is what he would do again. Also has a hx of cutting his wrists. Currently today is better, though he doesn't know why. Mood is a \"5\", SI less.     Pt has a brother in [facility] which is his only support. He would consider moving there.      *Psychiatric Examination:   Vitals:   Vitals:    03/26/21 1634 03/26/21 2100 03/27/21 0451 03/27/21 0816   BP: 128/63 134/79 122/65 125/61   Pulse: 74 76 62 65   Resp: 19 18 18 (!) 22   Temp: 36.8 °C (98.2 °F) 36.4 °C (97.6 °F) 36.1 °C (97 °F) 36.1 °C (97 °F)   TempSrc: Temporal Temporal Temporal Temporal   SpO2: 99% 94% 95% 94%   Weight:       Height:         General Appearance:  good eye contact  Abnormal Movements: none   Gait and Posture: not observed  Speech: soft  Thought Process: normal rate  Associations:   linear  Abnormal or Psychotic Thoughts: none  Judgement and Insight: fair  Orientation: grossly intact  Recent and Remote Memory: grossly intact  Attention Span and Concentration: intact  Language:fluent  Fund of Knowledge: not tested  Mood and Affect: euthymic appearing, mood as noted  SI/HI:  homicidal - no and vague SI         *ASSESSMENT/RECOMENDATIONS:  1. 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 Medical  -ostomy  -HTN  -pafib  -CHF  -DM2  -hypothyroidism  -orthostatic hypotension  -SRUTHI  -secondary adrenal insufficiency  -leukocytosis  -non ischemic cardiomyopathy         Legal hold: extended  Observation status: 1:1 sitter  Privileges (while on legal hold): may have a phone so that he " can keep contact with brother. he says he has the phone number memorized.     *Will Follow

## 2021-03-27 NOTE — PROGRESS NOTES
Hospital Medicine Daily Progress Note    Date of Service  3/27/2021    Chief Complaint  56 y.o. male admitted 2/9/2021 with syncope    Hospital Course  This is a 56 year-old man with a past medical history significant for atrial fibrillation, secondary hypercoagulable state. Diabetes mellitus with glyco of 5.2 complicated with diabetic neuropathy, history of congestive systolic heart failure, dyslipidemia presented to the ER on 2/9/2021 after he had a syncopal episode.    Patient does have a history of orthostatic hypotension and was started on fludrocortisone.  For the event, patient complained of significant dizziness.  Upon presentation in ER, patient was hypotensive along with bradycardia thus cardiology was consulted and evaluated and started to stopping all AV sanket blocking agent.  During the stay in the hospital, patient orthostatic vital signs continue to remain positive-was a started on midodrine 5 mg every 6 hours    Evaluated in the past by EP and neurology recurrent syncopal episode.  Currently patient is on digoxin 125 cc p.o. daily, along with Eliquis 5 mg p.o. twice daily for atrial fibrillation.    Echocardiogram was obtained which showed EF of 60%, improved from previous echocardiogram obtained on 11/2020.    Patient has h/o spleen injury, splenic flexure perforation in September 2020. S/p colostomy and Interiano's pouch in November 2020. He was at home taking care of his colostomy, however he has become depressed, overwhelmed and can no longer take care of his colostomy independently.    Psych continue to follow the patient, psychiatric medication as per psych recommendations.    Developed hypersensitivity rash and itching due to zosyn.   Switched to ciprofloxacin and treated with benadryl.  BCx resulted as pan-susceptible Klebsiella pneumo.   Discussed with pharmacist Ashley and decided on bactrim.      Interval Problem Update  I evaluated and examined the patient at the bedside.  Labs and imaging  were reviewed. Care plan was discussed with the patient and bedside nurse.    No overnight events. Patient was seen sitting out of bed and calm.    Pt still reported depression, suicidal ideation, hallucination with voice to kill himself.    Per surgery Dr. Desouza, high risk of ostomy reversal given his complexities. Plan for close multidisciplinary outpatient follow-up for surgery when  patient overall medical conditions more optimized.    Legal hold extended 4/1, need to meet court physician next week for further decision.    one-to-one sitter instead of the telemetry sitter.     PT OT recommended SNF, however pt cannot go SNF with legal hold, and he is not strong enough to go inpt pschy, Dc will be difficult.       Consultants/Specialty  Psychiatry- SI  Cardiology - Hypotension and Afib  General surgery    Code Status  Full Code    Disposition  Difficult discharge, PT OT recommended SNF, however pt cannot  go SNF with legal hold, and he is not strong enough to go inpt pschy, Dc will be difficult.     Review of Systems  Review of Systems   Constitutional: Positive for malaise/fatigue. Negative for chills and fever.   HENT: Negative.    Eyes: Negative.    Respiratory: Negative for cough.    Cardiovascular: Negative for chest pain.   Gastrointestinal: Negative for abdominal pain, nausea and vomiting.   Genitourinary: Negative for dysuria.   Musculoskeletal: Negative for myalgias.   Skin: Negative for rash.   Neurological: Negative for focal weakness and headaches.   Psychiatric/Behavioral: Positive for depression, hallucinations and suicidal ideas.        Physical Exam  Temp:  [36.1 °C (97 °F)-36.8 °C (98.2 °F)] 36.1 °C (97 °F)  Pulse:  [62-76] 65  Resp:  [18-22] 22  BP: (122-134)/(61-79) 125/61  SpO2:  [94 %-99 %] 94 %    Physical Exam  Vitals and nursing note reviewed.   Constitutional:       Appearance: He is ill-appearing.   HENT:      Head: Normocephalic.      Mouth/Throat:      Mouth: Mucous membranes are  moist.      Pharynx: Oropharynx is clear.   Eyes:      Pupils: Pupils are equal, round, and reactive to light.   Cardiovascular:      Rate and Rhythm: Normal rate and regular rhythm.      Heart sounds: Normal heart sounds.   Pulmonary:      Effort: Pulmonary effort is normal. No respiratory distress.      Breath sounds: Normal breath sounds. No wheezing.   Abdominal:      General: Abdomen is flat. Bowel sounds are normal.      Comments: Colostoma in place in left abdomen   Musculoskeletal:         General: Normal range of motion.      Cervical back: Normal range of motion.   Skin:     General: Skin is warm.   Neurological:      General: No focal deficit present.      Mental Status: He is alert and oriented to person, place, and time.   Psychiatric:         Mood and Affect: Mood is depressed.         Thought Content: Thought content includes suicidal ideation.         Fluids    Intake/Output Summary (Last 24 hours) at 3/27/2021 1305  Last data filed at 3/27/2021 0451  Gross per 24 hour   Intake 1240 ml   Output 950 ml   Net 290 ml       Laboratory  Recent Labs     03/25/21  0332 03/26/21  0607 03/27/21  0849   WBC 13.1* 12.0* 10.5   RBC 4.58* 4.93 4.93   HEMOGLOBIN 13.0* 14.1 14.2   HEMATOCRIT 40.9* 43.6 43.8   MCV 89.3 88.4 88.8   MCH 28.4 28.6 28.8   MCHC 31.8* 32.3* 32.4*   RDW 47.5 48.1 48.0   PLATELETCT 315 299 277   MPV 10.2 9.6 9.8     Recent Labs     03/25/21  0332 03/26/21  0607 03/27/21  0849   SODIUM 138 133* 135   POTASSIUM 4.3 4.4 4.4   CHLORIDE 105 99 98   CO2 27 25 28   GLUCOSE 120* 135* 107*   BUN 20 22 21   CREATININE 0.93 0.81 0.87   CALCIUM 8.8 9.4 9.2                   Imaging  [id number] VIEW   Final Result         No significant interval change.      DX-CHEST-LIMITED (1 VIEW)   Final Result         Diffuse interstitial prominence could relate to mild pulmonary edema or atypical infection      [id number] VIEW   Final Result      Increased colonic gas without definite bowel obstruction.       US-RENAL   Final Result      No evidence of hydronephrosis.      Lobulated kidneys bilaterally.      CT-ABDOMEN-PELVIS WITH   Final Result      1.  There is no evidence of small bowel obstruction.   2.  There is a left lower quadrant ostomy.   3.  There is fluid distention of the colon distal to the surgical material in the left mid descending colon extending all the way to the rectum. There is no pneumatosis or free air.   4.  There is cholelithiasis without biliary dilatation.   5.  There has been interval removal of the drainage catheter anterior to the spleen with minimal hypodense area in the anterior spleen again noted. There is no new fluid collection.      IR-US GUIDED PIV   Final Result    Ultrasound-guided PERIPHERAL IV INSERTION performed by    qualified nursing staff as above.      EC-ECHOCARDIOGRAM COMPLETE W/O CONT   Final Result      DX-LUMBAR SPINE-2 OR 3 VIEWS   Final Result      Moderate compression deformity of T11 is new compared to 2018.      Mild wedge deformity of T12 is unchanged.      Degenerative changes including facet arthropathy.      Mild retrolisthesis of L5 on S1 and L3 on L4.              Assessment/Plan  * Suicidal ideation- (present on admission)  Assessment & Plan  Has history of MDD, recurrent with psychotic features with active SI w/ plan.  Psychiatry consulted, continued legal hold  To be discharged to psychiatric program pending medical clearance and when able to self-care for ostomy    Legal hold extended 4/1, need to meet court physician next week for further decision    Cardiogenic pulmonary edema (HCC)  Assessment & Plan  CXR demonstrates pulmonary vascular congestion / edema  NT-BNP 7828  Due to IV boluses for hypotension and N/V in setting of HFrEF with recovered EF  IV lasix tolerated with improvement in BP and Cr, discontinued 3/14 for severe sepsis and lactic acidosis  3/16: Currently saturating well on room air.    Major depressive disorder, recurrent, severe with  psychotic features (HCC)- (present on admission)  Assessment & Plan  Psychiatry and Psychology following  Continue paroxetine, ziprasidone, and clonazepam    Secondary adrenal insufficiency (HCC)- (present on admission)  Assessment & Plan  Cosyntropin stimulation test reviewed with only a increase in his cortisol going from 14.5 then up to 21.3 after cosyntropin given  Renin 0.1, ACTH 3.4 and slight increase with cosyntropin suggesting secondary AI  Random cortisol 9.9  Needs outpatient endocrinology follow up.  Repeat adrenal function after discharge  Repeat AM BMP    Florinef restarted 3/14 at 0.2 mg daily (2x dose) due to severe sepsis from GNR bacteremia in order to prevent adrenal crisis    Sepsis with acute renal failure without septic shock (HCC)  Assessment & Plan  This is Severe Sepsis Not present on admission  SIRS criteria identified on my evaluation include: Tachycardia, with heart rate greater than 90 BPM and Leukocytosis, with WBC greater than 12,000  Source of infection is GNR bacteremia  Clinical indicators of end organ dysfunction include Systolic blood pressure (SBP) <90 mmHg or mean arterial pressure <65 mmHg and Lactate >2 mmol/L (18.0 mg/dL)  Sepsis protocol initiated  Fluid resuscitation not ordered due to pulmonary edema seen on CXR due to HFrEF  IV antibiotics as appropriate for source of sepsis - Zosyn    BCx x2 (3/13) +GNR in one bottle, awaiting speciation and susceptbilities  UCx added to UA (3/13) that showed pyuria and bacteruria  Cdiff negative (3/12)  Prior microbiology date reviewed, h/o enterococcus and MRSA but currently not positive for GPCs    Reassessment: I have reassessed the patient's hemodynamic status  End organ dysfunction include(s):  Acute kidney failure (SRUTHI)  Cr and BP improved after diuresis yesterday for cardiogenic pulmonary edema  Lactic Acid 2.8, improved with holding diuresis    Orthostatic hypotension- (present on admission)  Assessment & Plan  Multifactorial  including adrenal insufficiency, medications (beta blocker, CCB, tamsulosin), diabetic autonomic dysfunction, and venous insufficiency  EF is noted to be 60%, significant improvement from prior.  Per cardiology, no further cardiac work-up needed  Follow-up with cardiology as an outpatient.  Continue midodrine, PRN IVF    Bacteremia  Assessment & Plan  Due to klebsiella bacteremia  BCx: 1 of 2 bottles +Klebsiella  Cdiff negative  UA: Bacteruria and pyuria  UCx: In progress  CXR with pulmonary edema but no infiltrates  3/16: Currently on Ancef.  3/19: Switch him to Bactrim to complete a 10-day course.    Abdominal bloating  Assessment & Plan  Improved  Had increasing gas output from ostomy  Intermittent episodes of abdominal pain and N/V  CT 3/4 did not demonstrate obstruction nor pneumotosis  KUB 3/12 for recurrent symptoms demonstrated increased gas but no signs of SBO  Simethicone scheduled 80 mg TID  If uncontrolled, would confer with general surgeon about further imaging and discuss empiric treatment for SIBO    Normal anion gap metabolic acidosis  Assessment & Plan  Resolved  Due to bicarbonate loss via ostomy  Repeat AM BMP    Syncope due to orthostatic hypotension- (present on admission)  Assessment & Plan  Likely secondary to orthostatic hypotension  Echo normal with 60% ejection fraction, improved from prior    Paroxysmal atrial fibrillation (HCC)- (present on admission)  Assessment & Plan  CHADSVASC 3 (HTN, CHF, DM).   TTE Feb 2021: EF 60%  Continue metoprolol, digoxin, and eliquis  Discontinued telemetry    Chronic heart failure with preserved ejection fraction (HCC)- (present on admission)  Assessment & Plan  Decompensated based on peripheral and pulmonary edema, mild hyponatremia  IV diuresis initiated 3/13 for pulmonary edema, held 3/14 in setting of lactic acidosis from severe sepsis  Recovered EF 60% on repeat echo.  Continue metoprolol XL  Lisinopril held per Cardiology recommendations due to  hypotension    Diabetes mellitus type 2, insulin dependent (HCC)- (present on admission)  Assessment & Plan  A1c 5.2  Diabetic diet  Restarting accuchecks and SSI due to GNR sepsis  BG goal <200 with bacteremia  Continue atorvastatin    Colostomy present (HCC)- (present on admission)  Assessment & Plan  History of fall at home Nov 2020 on left side with perforated colon and splenic fluid collection/hematoma s/p segmental colectomy, colostomy, mobilization of the splenic flexure, wound vac, and I&D Nov 2020.   Ostomy care consulted, but he was non-participatory  He is engaging in ostomy care with his bedside RNs, encouraged to be observed but do it independently  Patient must be independently able to care for his ostomy before inpatient psychiatry will accept him    Per surgery Dr. Desouza, high risk of ostomy reversal given his complexities. Plan for close multidisciplinary outpatient follow-up for surgery when  patient overall medical conditions more optimized.      Anxiety and depression- (present on admission)  Assessment & Plan  On Legal Hold until 3/18  Psychiatry consulted  Continue paroxetine, ziprasidone, and clonazepam  Would likely benefit from CBT or DBT  3/16: Psychiatry recommending transferring to inpatient psychiatric facility once medically cleared.  However patient has to perform self ostomy care which has been challenging.  Management and  working on best safe discharge plan.    Hypothyroidism- (present on admission)  Assessment & Plan  TSH wnl Feb 2021  Continue home Levothyroxine    Acute renal failure superimposed on stage 3 chronic kidney disease (HCC)- (present on admission)  Assessment & Plan  CKD 3  Had improved with increased midodrine and diuresis, but worsened this morning in setting of holding diuresis for hypotension and lactic acidosis  500 cc NS bolus today, repeat BMP this afternoon  Developed AHRF with pulmonary edema from 1 L bolus, judicious IVF  Diuresis held,  continue midodrine  3/16: Discontinue Bactrim as it will worsen kidney functions. Slowly improving.  Avoid nephrotoxins.  Renally dose all medications.  3/18: Back to baseline.    Debility- (present on admission)  Assessment & Plan  Likely due to depression  PT/OT evaluated, no further skilled needs    Essential hypertension- (present on admission)  Assessment & Plan  Resolved, has been hypotensive this admission in setting of secondary adrenal insufficiency  Continue midodrine    Lower limb amputation, great toe (HCC)- (present on admission)  Assessment & Plan  s/p right great toe amputation 2018    Hypomagnesemia  Assessment & Plan  Mg WNL today  Repeat AM BMP/Mg    Hypokalemia  Assessment & Plan  K 3.2  Lasix held yesterday due to hypotension and lactic acidosis  Continue Kdur 20 daily  Discussed with pharmacy, no additional K today due to risk of hyperkalemia with bactrim  Mg WNL  Repeat AM BMP/Mg    Hyponatremia  Assessment & Plan  Moderate, Na 127  In setting of IV lasix, holding lasix due to severe sepsis with lactic acidosis  500 cc NS trial today, repeat afternoon BMP  If worsening will get Delmis and Uosm  Repeat AM BMP    Obstructive uropathy- (present on admission)  Assessment & Plan  Urinary retention on bladder scans by straight cath.  Zabala catheter was placed temporarily.  Remove with voiding trials per nursing protocol.  Started on Flomax.  3/19: Zabala catheter was removed.  Voiding trials successful so far.    Rash- (present on admission)  Assessment & Plan  Patient developed pruritic drug related rash   Prn Benadryl, steroids   3/18: Resolving.    Chronic venous insufficiency of lower extremity- (present on admission)  Assessment & Plan  Compression stockings    Elevated troponin- (present on admission)  Assessment & Plan  RESOLVED  Stable. Suspect due to demand ischemia. No need to trend further.    Nonischemic cardiomyopathy (HCC)- (present on admission)  Assessment & Plan  Normal coronaries,  2018.  Tachycardia mediated per cardiology  LVEF improved, based on echo 2/10/2021 LVEF 60%    Chest pain- (present on admission)  Assessment & Plan  Resolved  EKG and Troponins not consistent with ACS    Mixed hyperlipidemia- (present on admission)  Assessment & Plan  Continue atorvastatin    Obesity- (present on admission)  Assessment & Plan  Body mass index is 37.79 kg/m².  Counseled about diet and exercise       VTE prophylaxis: apixaban

## 2021-03-28 LAB
ALBUMIN SERPL BCP-MCNC: 2.9 G/DL (ref 3.2–4.9)
ALBUMIN/GLOB SERPL: 0.9 G/DL
ALP SERPL-CCNC: 105 U/L (ref 30–99)
ALT SERPL-CCNC: 20 U/L (ref 2–50)
ANION GAP SERPL CALC-SCNC: 9 MMOL/L (ref 7–16)
AST SERPL-CCNC: 19 U/L (ref 12–45)
BASOPHILS # BLD AUTO: 1.2 % (ref 0–1.8)
BASOPHILS # BLD: 0.14 K/UL (ref 0–0.12)
BILIRUB SERPL-MCNC: 0.4 MG/DL (ref 0.1–1.5)
BUN SERPL-MCNC: 22 MG/DL (ref 8–22)
CALCIUM SERPL-MCNC: 8.9 MG/DL (ref 8.5–10.5)
CHLORIDE SERPL-SCNC: 102 MMOL/L (ref 96–112)
CO2 SERPL-SCNC: 25 MMOL/L (ref 20–33)
CREAT SERPL-MCNC: 0.8 MG/DL (ref 0.5–1.4)
EOSINOPHIL # BLD AUTO: 0.7 K/UL (ref 0–0.51)
EOSINOPHIL NFR BLD: 6 % (ref 0–6.9)
ERYTHROCYTE [DISTWIDTH] IN BLOOD BY AUTOMATED COUNT: 47.8 FL (ref 35.9–50)
GLOBULIN SER CALC-MCNC: 3.3 G/DL (ref 1.9–3.5)
GLUCOSE BLD-MCNC: 115 MG/DL (ref 65–99)
GLUCOSE BLD-MCNC: 127 MG/DL (ref 65–99)
GLUCOSE BLD-MCNC: 135 MG/DL (ref 65–99)
GLUCOSE SERPL-MCNC: 115 MG/DL (ref 65–99)
HCT VFR BLD AUTO: 42.7 % (ref 42–52)
HGB BLD-MCNC: 14 G/DL (ref 14–18)
IMM GRANULOCYTES # BLD AUTO: 0.05 K/UL (ref 0–0.11)
IMM GRANULOCYTES NFR BLD AUTO: 0.4 % (ref 0–0.9)
LYMPHOCYTES # BLD AUTO: 4.13 K/UL (ref 1–4.8)
LYMPHOCYTES NFR BLD: 35.4 % (ref 22–41)
MCH RBC QN AUTO: 29.4 PG (ref 27–33)
MCHC RBC AUTO-ENTMCNC: 32.8 G/DL (ref 33.7–35.3)
MCV RBC AUTO: 89.5 FL (ref 81.4–97.8)
MONOCYTES # BLD AUTO: 0.73 K/UL (ref 0–0.85)
MONOCYTES NFR BLD AUTO: 6.3 % (ref 0–13.4)
NEUTROPHILS # BLD AUTO: 5.91 K/UL (ref 1.82–7.42)
NEUTROPHILS NFR BLD: 50.7 % (ref 44–72)
NRBC # BLD AUTO: 0 K/UL
NRBC BLD-RTO: 0 /100 WBC
PLATELET # BLD AUTO: 251 K/UL (ref 164–446)
PMV BLD AUTO: 10.6 FL (ref 9–12.9)
POTASSIUM SERPL-SCNC: 4.6 MMOL/L (ref 3.6–5.5)
PROT SERPL-MCNC: 6.2 G/DL (ref 6–8.2)
RBC # BLD AUTO: 4.77 M/UL (ref 4.7–6.1)
SODIUM SERPL-SCNC: 136 MMOL/L (ref 135–145)
WBC # BLD AUTO: 11.7 K/UL (ref 4.8–10.8)

## 2021-03-28 PROCEDURE — 700102 HCHG RX REV CODE 250 W/ 637 OVERRIDE(OP): Performed by: HOSPITALIST

## 2021-03-28 PROCEDURE — 770001 HCHG ROOM/CARE - MED/SURG/GYN PRIV*

## 2021-03-28 PROCEDURE — 700102 HCHG RX REV CODE 250 W/ 637 OVERRIDE(OP): Performed by: STUDENT IN AN ORGANIZED HEALTH CARE EDUCATION/TRAINING PROGRAM

## 2021-03-28 PROCEDURE — 85025 COMPLETE CBC W/AUTO DIFF WBC: CPT

## 2021-03-28 PROCEDURE — 36415 COLL VENOUS BLD VENIPUNCTURE: CPT

## 2021-03-28 PROCEDURE — A9270 NON-COVERED ITEM OR SERVICE: HCPCS | Performed by: STUDENT IN AN ORGANIZED HEALTH CARE EDUCATION/TRAINING PROGRAM

## 2021-03-28 PROCEDURE — 700102 HCHG RX REV CODE 250 W/ 637 OVERRIDE(OP): Performed by: FAMILY MEDICINE

## 2021-03-28 PROCEDURE — A9270 NON-COVERED ITEM OR SERVICE: HCPCS | Performed by: HOSPITALIST

## 2021-03-28 PROCEDURE — 99231 SBSQ HOSP IP/OBS SF/LOW 25: CPT | Performed by: PSYCHIATRY & NEUROLOGY

## 2021-03-28 PROCEDURE — 82962 GLUCOSE BLOOD TEST: CPT | Mod: 91

## 2021-03-28 PROCEDURE — 80053 COMPREHEN METABOLIC PANEL: CPT

## 2021-03-28 PROCEDURE — 99232 SBSQ HOSP IP/OBS MODERATE 35: CPT | Performed by: STUDENT IN AN ORGANIZED HEALTH CARE EDUCATION/TRAINING PROGRAM

## 2021-03-28 PROCEDURE — A9270 NON-COVERED ITEM OR SERVICE: HCPCS | Performed by: FAMILY MEDICINE

## 2021-03-28 RX ADMIN — SIMETHICONE 80 MG: 80 TABLET, CHEWABLE ORAL at 17:50

## 2021-03-28 RX ADMIN — SIMETHICONE 80 MG: 80 TABLET, CHEWABLE ORAL at 12:33

## 2021-03-28 RX ADMIN — CLONAZEPAM 1 MG: 1 TABLET ORAL at 17:50

## 2021-03-28 RX ADMIN — DOCUSATE SODIUM 50 MG AND SENNOSIDES 8.6 MG 2 TABLET: 8.6; 5 TABLET, FILM COATED ORAL at 19:24

## 2021-03-28 RX ADMIN — ATORVASTATIN CALCIUM 40 MG: 40 TABLET, FILM COATED ORAL at 17:50

## 2021-03-28 RX ADMIN — FLUDROCORTISONE ACETATE 0.1 MG: 0.1 TABLET ORAL at 04:18

## 2021-03-28 RX ADMIN — METOPROLOL SUCCINATE 25 MG: 25 TABLET, EXTENDED RELEASE ORAL at 04:17

## 2021-03-28 RX ADMIN — CLONAZEPAM 0.5 MG: 0.5 TABLET ORAL at 08:39

## 2021-03-28 RX ADMIN — ZIPRASIDONE HYDROCHLORIDE 60 MG: 60 CAPSULE ORAL at 08:39

## 2021-03-28 RX ADMIN — MIDODRINE HYDROCHLORIDE 5 MG: 5 TABLET ORAL at 12:33

## 2021-03-28 RX ADMIN — ZIPRASIDONE HYDROCHLORIDE 60 MG: 60 CAPSULE ORAL at 19:24

## 2021-03-28 RX ADMIN — OMEPRAZOLE 20 MG: 20 CAPSULE, DELAYED RELEASE ORAL at 04:27

## 2021-03-28 RX ADMIN — APIXABAN 5 MG: 5 TABLET, FILM COATED ORAL at 08:39

## 2021-03-28 RX ADMIN — TAMSULOSIN HYDROCHLORIDE 0.4 MG: 0.4 CAPSULE ORAL at 04:17

## 2021-03-28 RX ADMIN — APIXABAN 5 MG: 5 TABLET, FILM COATED ORAL at 19:24

## 2021-03-28 RX ADMIN — DOCUSATE SODIUM 50 MG AND SENNOSIDES 8.6 MG 2 TABLET: 8.6; 5 TABLET, FILM COATED ORAL at 04:18

## 2021-03-28 RX ADMIN — DIGOXIN 125 MCG: 125 TABLET ORAL at 17:50

## 2021-03-28 RX ADMIN — POTASSIUM CHLORIDE 20 MEQ: 20 TABLET, EXTENDED RELEASE ORAL at 04:17

## 2021-03-28 RX ADMIN — PAROXETINE HYDROCHLORIDE 20 MG: 20 TABLET, FILM COATED ORAL at 08:39

## 2021-03-28 RX ADMIN — INSULIN GLARGINE 5 UNITS: 100 INJECTION, SOLUTION SUBCUTANEOUS at 04:28

## 2021-03-28 RX ADMIN — MIDODRINE HYDROCHLORIDE 5 MG: 5 TABLET ORAL at 17:50

## 2021-03-28 RX ADMIN — SIMETHICONE 80 MG: 80 TABLET, CHEWABLE ORAL at 04:27

## 2021-03-28 RX ADMIN — LEVOTHYROXINE SODIUM 112 MCG: 0.11 TABLET ORAL at 04:17

## 2021-03-28 RX ADMIN — MIDODRINE HYDROCHLORIDE 5 MG: 5 TABLET ORAL at 04:17

## 2021-03-28 ASSESSMENT — ENCOUNTER SYMPTOMS
DEPRESSION: 1
MYALGIAS: 0
EYES NEGATIVE: 1
HALLUCINATIONS: 1
NAUSEA: 0
COUGH: 0
ABDOMINAL PAIN: 0
CHILLS: 0
HEADACHES: 0
FEVER: 0
FOCAL WEAKNESS: 0
VOMITING: 0

## 2021-03-28 ASSESSMENT — PAIN DESCRIPTION - PAIN TYPE: TYPE: ACUTE PAIN

## 2021-03-28 NOTE — CONSULTS
"PSYCHIATRIC FOLLOW-UP:(established)  *Reason for admission: Syncope, hypotension. Being followed for SI   *Legal Hold Status: extended                *HPI: says he slept at night last night and well but he is more sad, an \"8\" and doesn't know why. SI continues.            *Psychiatric Examination: unchanged except for worsened mood   Vitals:   Vitals:    03/27/21 1615 03/27/21 2051 03/28/21 0500 03/28/21 0912   BP: 126/61 107/60 120/65 128/71   Pulse: 64 61 83 66   Resp: 19 18 18 20   Temp: 36.4 °C (97.6 °F) 36.7 °C (98 °F) 36.4 °C (97.5 °F) 36.6 °C (97.9 °F)   TempSrc: Temporal Temporal Temporal Temporal   SpO2: 95% 93% 93% 96%   Weight:       Height:              *ASSESSMENT/RECOMENDATIONS:  1. 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 Medical  -ostomy  -HTN  -pafib  -CHF  -DM2  -hypothyroidism  -orthostatic hypotension  -SRUTHI  -secondary adrenal insufficiency  -leukocytosis  -non ischemic cardiomyopathy         Legal hold: extended. Would a nutritional consult be helpful? Pt wants to lose wt that he believes must come off in order to have a reanastomosis of his ostomy.   Observation status: 1:1 sitter  Privileges (while on legal hold): phone    *Will Follow    "

## 2021-03-28 NOTE — CARE PLAN
Care Plan    Problem: Communication  Goal: The ability to communicate needs accurately and effectively will improve  Outcome: PROGRESSING AS EXPECTED     Problem: Safety  Goal: Will remain free from injury  Outcome: PROGRESSING AS EXPECTED  Goal: Will remain free from falls  Outcome: PROGRESSING AS EXPECTED     Problem: Infection  Goal: Will remain free from infection  Outcome: PROGRESSING AS EXPECTED     Problem: Venous Thromboembolism (VTW)/Deep Vein Thrombosis (DVT) Prevention:  Goal: Patient will participate in Venous Thrombosis (VTE)/Deep Vein Thrombosis (DVT)Prevention Measures  Outcome: PROGRESSING AS EXPECTED     Problem: Bowel/Gastric:  Goal: Normal bowel function is maintained or improved  Outcome: PROGRESSING AS EXPECTED  Goal: Will not experience complications related to bowel motility  Outcome: PROGRESSING AS EXPECTED     Problem: Knowledge Deficit  Goal: Knowledge of disease process/condition, treatment plan, diagnostic tests, and medications will improve  Outcome: PROGRESSING AS EXPECTED  Goal: Knowledge of the prescribed therapeutic regimen will improve  Outcome: PROGRESSING AS EXPECTED     Problem: Discharge Barriers/Planning  Goal: Patient's continuum of care needs will be met  Outcome: PROGRESSING AS EXPECTED     Problem: Fluid Volume:  Goal: Will maintain balanced intake and output  Outcome: PROGRESSING AS EXPECTED

## 2021-03-28 NOTE — PROGRESS NOTES
Hospital Medicine Daily Progress Note    Date of Service  3/28/2021    Chief Complaint  56 y.o. male admitted 2/9/2021 with syncope    Hospital Course  This is a 56 year-old man with a past medical history significant for atrial fibrillation, secondary hypercoagulable state. Diabetes mellitus with glyco of 5.2 complicated with diabetic neuropathy, history of congestive systolic heart failure, dyslipidemia presented to the ER on 2/9/2021 after he had a syncopal episode.    Patient does have a history of orthostatic hypotension and was started on fludrocortisone.  For the event, patient complained of significant dizziness.  Upon presentation in ER, patient was hypotensive along with bradycardia thus cardiology was consulted and evaluated and started to stopping all AV sanket blocking agent.  During the stay in the hospital, patient orthostatic vital signs continue to remain positive-was a started on midodrine 5 mg every 6 hours    Evaluated in the past by EP and neurology recurrent syncopal episode.  Currently patient is on digoxin 125 cc p.o. daily, along with Eliquis 5 mg p.o. twice daily for atrial fibrillation.    Echocardiogram was obtained which showed EF of 60%, improved from previous echocardiogram obtained on 11/2020.    Patient has h/o spleen injury, splenic flexure perforation in September 2020. S/p colostomy and Interiano's pouch in November 2020. He was at home taking care of his colostomy, however he has become depressed, overwhelmed and can no longer take care of his colostomy independently.    Psych continue to follow the patient, psychiatric medication as per psych recommendations.    Developed hypersensitivity rash and itching due to zosyn.   Switched to ciprofloxacin and treated with benadryl.  BCx resulted as pan-susceptible Klebsiella pneumo.   Discussed with pharmacist Ashley and decided on bactrim.      Interval Problem Update  I evaluated and examined the patient at the bedside.  Labs and imaging  were reviewed. Care plan was discussed with the patient and bedside nurse.    Patient was seen walking with walker with the assistance from physical therapy.  No new complaints    Pt still reported depression, suicidal ideation, hallucination with voice to kill himself.    Per surgery Dr. Desouza, high risk of ostomy reversal given his complexities. Plan for close multidisciplinary outpatient follow-up for surgery when  patient overall medical conditions more optimized.    Legal hold extended 4/1, need to meet court physician next week for further decision.    one-to-one sitter    PT OT recommended SNF, however pt cannot go SNF with legal hold, and he is not strong enough to go inpt pschy, Dc will be difficult.       Consultants/Specialty  Psychiatry- SI  Cardiology - Hypotension and Afib  General surgery    Code Status  Full Code    Disposition  Difficult discharge, PT OT recommended SNF, however pt cannot  go SNF with legal hold, and he is not strong enough to go inpt pschy, Dc will be difficult.     Review of Systems  Review of Systems   Constitutional: Positive for malaise/fatigue. Negative for chills and fever.   HENT: Negative.    Eyes: Negative.    Respiratory: Negative for cough.    Cardiovascular: Negative for chest pain.   Gastrointestinal: Negative for abdominal pain, nausea and vomiting.   Genitourinary: Negative for dysuria.   Musculoskeletal: Negative for myalgias.   Skin: Negative for rash.   Neurological: Negative for focal weakness and headaches.   Psychiatric/Behavioral: Positive for depression, hallucinations and suicidal ideas.        Physical Exam  Temp:  [36.4 °C (97.5 °F)-36.7 °C (98 °F)] 36.6 °C (97.9 °F)  Pulse:  [61-83] 66  Resp:  [18-20] 20  BP: (107-128)/(60-71) 128/71  SpO2:  [93 %-96 %] 96 %    Physical Exam  Vitals and nursing note reviewed.   Constitutional:       Appearance: He is ill-appearing.   HENT:      Head: Normocephalic.      Mouth/Throat:      Mouth: Mucous membranes are  moist.      Pharynx: Oropharynx is clear.   Eyes:      Pupils: Pupils are equal, round, and reactive to light.   Cardiovascular:      Rate and Rhythm: Normal rate and regular rhythm.      Heart sounds: Normal heart sounds.   Pulmonary:      Effort: Pulmonary effort is normal. No respiratory distress.      Breath sounds: Normal breath sounds. No wheezing.   Abdominal:      General: Abdomen is flat. Bowel sounds are normal.      Comments: Colostoma in place in left abdomen   Musculoskeletal:         General: Normal range of motion.      Cervical back: Normal range of motion.   Skin:     General: Skin is warm.   Neurological:      General: No focal deficit present.      Mental Status: He is alert and oriented to person, place, and time.   Psychiatric:         Mood and Affect: Mood is depressed.         Thought Content: Thought content includes suicidal ideation.         Fluids    Intake/Output Summary (Last 24 hours) at 3/28/2021 1520  Last data filed at 3/28/2021 1200  Gross per 24 hour   Intake 240 ml   Output 1300 ml   Net -1060 ml       Laboratory  Recent Labs     03/26/21  0607 03/27/21  0849 03/28/21  0332   WBC 12.0* 10.5 11.7*   RBC 4.93 4.93 4.77   HEMOGLOBIN 14.1 14.2 14.0   HEMATOCRIT 43.6 43.8 42.7   MCV 88.4 88.8 89.5   MCH 28.6 28.8 29.4   MCHC 32.3* 32.4* 32.8*   RDW 48.1 48.0 47.8   PLATELETCT 299 277 251   MPV 9.6 9.8 10.6     Recent Labs     03/26/21  0607 03/27/21  0849 03/28/21  0332   SODIUM 133* 135 136   POTASSIUM 4.4 4.4 4.6   CHLORIDE 99 98 102   CO2 25 28 25   GLUCOSE 135* 107* 115*   BUN 22 21 22   CREATININE 0.81 0.87 0.80   CALCIUM 9.4 9.2 8.9                   Imaging  [id number] VIEW   Final Result         No significant interval change.      DX-CHEST-LIMITED (1 VIEW)   Final Result         Diffuse interstitial prominence could relate to mild pulmonary edema or atypical infection      [id number] VIEW   Final Result      Increased colonic gas without definite bowel obstruction.       US-RENAL   Final Result      No evidence of hydronephrosis.      Lobulated kidneys bilaterally.      CT-ABDOMEN-PELVIS WITH   Final Result      1.  There is no evidence of small bowel obstruction.   2.  There is a left lower quadrant ostomy.   3.  There is fluid distention of the colon distal to the surgical material in the left mid descending colon extending all the way to the rectum. There is no pneumatosis or free air.   4.  There is cholelithiasis without biliary dilatation.   5.  There has been interval removal of the drainage catheter anterior to the spleen with minimal hypodense area in the anterior spleen again noted. There is no new fluid collection.      IR-US GUIDED PIV   Final Result    Ultrasound-guided PERIPHERAL IV INSERTION performed by    qualified nursing staff as above.      EC-ECHOCARDIOGRAM COMPLETE W/O CONT   Final Result      DX-LUMBAR SPINE-2 OR 3 VIEWS   Final Result      Moderate compression deformity of T11 is new compared to 2018.      Mild wedge deformity of T12 is unchanged.      Degenerative changes including facet arthropathy.      Mild retrolisthesis of L5 on S1 and L3 on L4.              Assessment/Plan  * Suicidal ideation- (present on admission)  Assessment & Plan  Has history of MDD, recurrent with psychotic features with active SI w/ plan.  Psychiatry consulted, continued legal hold  To be discharged to psychiatric program pending medical clearance and when able to self-care for ostomy    Legal hold extended 4/1, need to meet court physician next week for further decision    Cardiogenic pulmonary edema (HCC)  Assessment & Plan  CXR demonstrates pulmonary vascular congestion / edema  NT-BNP 7828  Due to IV boluses for hypotension and N/V in setting of HFrEF with recovered EF  IV lasix tolerated with improvement in BP and Cr, discontinued 3/14 for severe sepsis and lactic acidosis  3/16: Currently saturating well on room air.    Major depressive disorder, recurrent, severe with  psychotic features (HCC)- (present on admission)  Assessment & Plan  Psychiatry and Psychology following  Continue paroxetine, ziprasidone, and clonazepam    Secondary adrenal insufficiency (HCC)- (present on admission)  Assessment & Plan  Cosyntropin stimulation test reviewed with only a increase in his cortisol going from 14.5 then up to 21.3 after cosyntropin given  Renin 0.1, ACTH 3.4 and slight increase with cosyntropin suggesting secondary AI  Random cortisol 9.9  Needs outpatient endocrinology follow up.  Repeat adrenal function after discharge  Repeat AM BMP    Florinef restarted 3/14 at 0.2 mg daily (2x dose) due to severe sepsis from GNR bacteremia in order to prevent adrenal crisis    Sepsis with acute renal failure without septic shock (HCC)  Assessment & Plan  This is Severe Sepsis Not present on admission  SIRS criteria identified on my evaluation include: Tachycardia, with heart rate greater than 90 BPM and Leukocytosis, with WBC greater than 12,000  Source of infection is GNR bacteremia  Clinical indicators of end organ dysfunction include Systolic blood pressure (SBP) <90 mmHg or mean arterial pressure <65 mmHg and Lactate >2 mmol/L (18.0 mg/dL)  Sepsis protocol initiated  Fluid resuscitation not ordered due to pulmonary edema seen on CXR due to HFrEF  IV antibiotics as appropriate for source of sepsis - Zosyn    BCx x2 (3/13) +GNR in one bottle, awaiting speciation and susceptbilities  UCx added to UA (3/13) that showed pyuria and bacteruria  Cdiff negative (3/12)  Prior microbiology date reviewed, h/o enterococcus and MRSA but currently not positive for GPCs    Reassessment: I have reassessed the patient's hemodynamic status  End organ dysfunction include(s):  Acute kidney failure (SRUTHI)  Cr and BP improved after diuresis yesterday for cardiogenic pulmonary edema  Lactic Acid 2.8, improved with holding diuresis    Orthostatic hypotension- (present on admission)  Assessment & Plan  Multifactorial  including adrenal insufficiency, medications (beta blocker, CCB, tamsulosin), diabetic autonomic dysfunction, and venous insufficiency  EF is noted to be 60%, significant improvement from prior.  Per cardiology, no further cardiac work-up needed  Follow-up with cardiology as an outpatient.  Continue midodrine, PRN IVF    Bacteremia  Assessment & Plan  Due to klebsiella bacteremia  BCx: 1 of 2 bottles +Klebsiella  Cdiff negative  UA: Bacteruria and pyuria  UCx: In progress  CXR with pulmonary edema but no infiltrates  3/16: Currently on Ancef.  3/19: Switch him to Bactrim to complete a 10-day course.    Abdominal bloating  Assessment & Plan  Improved  Had increasing gas output from ostomy  Intermittent episodes of abdominal pain and N/V  CT 3/4 did not demonstrate obstruction nor pneumotosis  KUB 3/12 for recurrent symptoms demonstrated increased gas but no signs of SBO  Simethicone scheduled 80 mg TID  If uncontrolled, would confer with general surgeon about further imaging and discuss empiric treatment for SIBO    Normal anion gap metabolic acidosis  Assessment & Plan  Resolved  Due to bicarbonate loss via ostomy  Repeat AM BMP    Syncope due to orthostatic hypotension- (present on admission)  Assessment & Plan  Likely secondary to orthostatic hypotension  Echo normal with 60% ejection fraction, improved from prior    Paroxysmal atrial fibrillation (HCC)- (present on admission)  Assessment & Plan  CHADSVASC 3 (HTN, CHF, DM).   TTE Feb 2021: EF 60%  Continue metoprolol, digoxin, and eliquis  Discontinued telemetry    Chronic heart failure with preserved ejection fraction (HCC)- (present on admission)  Assessment & Plan  Decompensated based on peripheral and pulmonary edema, mild hyponatremia  IV diuresis initiated 3/13 for pulmonary edema, held 3/14 in setting of lactic acidosis from severe sepsis  Recovered EF 60% on repeat echo.  Continue metoprolol XL  Lisinopril held per Cardiology recommendations due to  hypotension    Diabetes mellitus type 2, insulin dependent (HCC)- (present on admission)  Assessment & Plan  A1c 5.2  Diabetic diet  Restarting accuchecks and SSI due to GNR sepsis  BG goal <200 with bacteremia  Continue atorvastatin    Colostomy present (HCC)- (present on admission)  Assessment & Plan  History of fall at home Nov 2020 on left side with perforated colon and splenic fluid collection/hematoma s/p segmental colectomy, colostomy, mobilization of the splenic flexure, wound vac, and I&D Nov 2020.   Ostomy care consulted, but he was non-participatory  He is engaging in ostomy care with his bedside RNs, encouraged to be observed but do it independently  Patient must be independently able to care for his ostomy before inpatient psychiatry will accept him    Per surgery Dr. Desouza, high risk of ostomy reversal given his complexities. Plan for close multidisciplinary outpatient follow-up for surgery when  patient overall medical conditions more optimized.      Anxiety and depression- (present on admission)  Assessment & Plan  On Legal Hold until 3/18  Psychiatry consulted  Continue paroxetine, ziprasidone, and clonazepam  Would likely benefit from CBT or DBT  3/16: Psychiatry recommending transferring to inpatient psychiatric facility once medically cleared.  However patient has to perform self ostomy care which has been challenging.  Management and  working on best safe discharge plan.    Hypothyroidism- (present on admission)  Assessment & Plan  TSH wnl Feb 2021  Continue home Levothyroxine    Acute renal failure superimposed on stage 3 chronic kidney disease (HCC)- (present on admission)  Assessment & Plan  CKD 3  Had improved with increased midodrine and diuresis, but worsened this morning in setting of holding diuresis for hypotension and lactic acidosis  500 cc NS bolus today, repeat BMP this afternoon  Developed AHRF with pulmonary edema from 1 L bolus, judicious IVF  Diuresis held,  continue midodrine  3/16: Discontinue Bactrim as it will worsen kidney functions. Slowly improving.  Avoid nephrotoxins.  Renally dose all medications.  3/18: Back to baseline.    Debility- (present on admission)  Assessment & Plan  Likely due to depression  PT/OT evaluated, no further skilled needs    Essential hypertension- (present on admission)  Assessment & Plan  Resolved, has been hypotensive this admission in setting of secondary adrenal insufficiency  Continue midodrine    Lower limb amputation, great toe (HCC)- (present on admission)  Assessment & Plan  s/p right great toe amputation 2018    Hypomagnesemia  Assessment & Plan  Mg WNL today  Repeat AM BMP/Mg    Hypokalemia  Assessment & Plan  K 3.2  Lasix held yesterday due to hypotension and lactic acidosis  Continue Kdur 20 daily  Discussed with pharmacy, no additional K today due to risk of hyperkalemia with bactrim  Mg WNL  Repeat AM BMP/Mg    Hyponatremia  Assessment & Plan  Moderate, Na 127  In setting of IV lasix, holding lasix due to severe sepsis with lactic acidosis  500 cc NS trial today, repeat afternoon BMP  If worsening will get Delmis and Uosm  Repeat AM BMP    Obstructive uropathy- (present on admission)  Assessment & Plan  Urinary retention on bladder scans by straight cath.  Zabala catheter was placed temporarily.  Remove with voiding trials per nursing protocol.  Started on Flomax.  3/19: Zabala catheter was removed.  Voiding trials successful so far.    Rash- (present on admission)  Assessment & Plan  Patient developed pruritic drug related rash   Prn Benadryl, steroids   3/18: Resolving.    Chronic venous insufficiency of lower extremity- (present on admission)  Assessment & Plan  Compression stockings    Elevated troponin- (present on admission)  Assessment & Plan  RESOLVED  Stable. Suspect due to demand ischemia. No need to trend further.    Nonischemic cardiomyopathy (HCC)- (present on admission)  Assessment & Plan  Normal coronaries,  2018.  Tachycardia mediated per cardiology  LVEF improved, based on echo 2/10/2021 LVEF 60%    Chest pain- (present on admission)  Assessment & Plan  Resolved  EKG and Troponins not consistent with ACS    Mixed hyperlipidemia- (present on admission)  Assessment & Plan  Continue atorvastatin    Obesity- (present on admission)  Assessment & Plan  Body mass index is 37.79 kg/m².  Counseled about diet and exercise       VTE prophylaxis: apixaban

## 2021-03-29 LAB
GLUCOSE BLD-MCNC: 122 MG/DL (ref 65–99)
GLUCOSE BLD-MCNC: 123 MG/DL (ref 65–99)
GLUCOSE BLD-MCNC: 97 MG/DL (ref 65–99)

## 2021-03-29 PROCEDURE — A9270 NON-COVERED ITEM OR SERVICE: HCPCS | Performed by: HOSPITALIST

## 2021-03-29 PROCEDURE — 700102 HCHG RX REV CODE 250 W/ 637 OVERRIDE(OP): Performed by: STUDENT IN AN ORGANIZED HEALTH CARE EDUCATION/TRAINING PROGRAM

## 2021-03-29 PROCEDURE — A9270 NON-COVERED ITEM OR SERVICE: HCPCS | Performed by: FAMILY MEDICINE

## 2021-03-29 PROCEDURE — 99232 SBSQ HOSP IP/OBS MODERATE 35: CPT | Performed by: STUDENT IN AN ORGANIZED HEALTH CARE EDUCATION/TRAINING PROGRAM

## 2021-03-29 PROCEDURE — 97116 GAIT TRAINING THERAPY: CPT

## 2021-03-29 PROCEDURE — A9270 NON-COVERED ITEM OR SERVICE: HCPCS | Performed by: STUDENT IN AN ORGANIZED HEALTH CARE EDUCATION/TRAINING PROGRAM

## 2021-03-29 PROCEDURE — 97530 THERAPEUTIC ACTIVITIES: CPT

## 2021-03-29 PROCEDURE — 700102 HCHG RX REV CODE 250 W/ 637 OVERRIDE(OP): Performed by: FAMILY MEDICINE

## 2021-03-29 PROCEDURE — 700102 HCHG RX REV CODE 250 W/ 637 OVERRIDE(OP): Performed by: HOSPITALIST

## 2021-03-29 PROCEDURE — 770001 HCHG ROOM/CARE - MED/SURG/GYN PRIV*

## 2021-03-29 PROCEDURE — 82962 GLUCOSE BLOOD TEST: CPT | Mod: 91

## 2021-03-29 RX ADMIN — INSULIN GLARGINE 5 UNITS: 100 INJECTION, SOLUTION SUBCUTANEOUS at 04:27

## 2021-03-29 RX ADMIN — DOCUSATE SODIUM 50 MG AND SENNOSIDES 8.6 MG 2 TABLET: 8.6; 5 TABLET, FILM COATED ORAL at 04:19

## 2021-03-29 RX ADMIN — FLUDROCORTISONE ACETATE 0.1 MG: 0.1 TABLET ORAL at 04:19

## 2021-03-29 RX ADMIN — SIMETHICONE 80 MG: 80 TABLET, CHEWABLE ORAL at 14:00

## 2021-03-29 RX ADMIN — DIGOXIN 125 MCG: 125 TABLET ORAL at 18:45

## 2021-03-29 RX ADMIN — CLONAZEPAM 1 MG: 1 TABLET ORAL at 18:46

## 2021-03-29 RX ADMIN — ATORVASTATIN CALCIUM 40 MG: 40 TABLET, FILM COATED ORAL at 18:46

## 2021-03-29 RX ADMIN — APIXABAN 5 MG: 5 TABLET, FILM COATED ORAL at 08:56

## 2021-03-29 RX ADMIN — DOCUSATE SODIUM 50 MG AND SENNOSIDES 8.6 MG 2 TABLET: 8.6; 5 TABLET, FILM COATED ORAL at 20:17

## 2021-03-29 RX ADMIN — ZIPRASIDONE HYDROCHLORIDE 60 MG: 60 CAPSULE ORAL at 08:55

## 2021-03-29 RX ADMIN — LEVOTHYROXINE SODIUM 112 MCG: 0.11 TABLET ORAL at 04:18

## 2021-03-29 RX ADMIN — OMEPRAZOLE 20 MG: 20 CAPSULE, DELAYED RELEASE ORAL at 04:19

## 2021-03-29 RX ADMIN — TAMSULOSIN HYDROCHLORIDE 0.4 MG: 0.4 CAPSULE ORAL at 04:19

## 2021-03-29 RX ADMIN — POTASSIUM CHLORIDE 20 MEQ: 20 TABLET, EXTENDED RELEASE ORAL at 04:19

## 2021-03-29 RX ADMIN — PAROXETINE HYDROCHLORIDE 20 MG: 20 TABLET, FILM COATED ORAL at 08:55

## 2021-03-29 RX ADMIN — ZIPRASIDONE HYDROCHLORIDE 60 MG: 60 CAPSULE ORAL at 20:17

## 2021-03-29 RX ADMIN — SIMETHICONE 80 MG: 80 TABLET, CHEWABLE ORAL at 18:46

## 2021-03-29 RX ADMIN — APIXABAN 5 MG: 5 TABLET, FILM COATED ORAL at 20:17

## 2021-03-29 RX ADMIN — SIMETHICONE 80 MG: 80 TABLET, CHEWABLE ORAL at 04:20

## 2021-03-29 RX ADMIN — CLONAZEPAM 0.5 MG: 0.5 TABLET ORAL at 08:55

## 2021-03-29 RX ADMIN — MIDODRINE HYDROCHLORIDE 5 MG: 5 TABLET ORAL at 18:45

## 2021-03-29 RX ADMIN — METOPROLOL SUCCINATE 25 MG: 25 TABLET, EXTENDED RELEASE ORAL at 04:18

## 2021-03-29 ASSESSMENT — COGNITIVE AND FUNCTIONAL STATUS - GENERAL
STANDING UP FROM CHAIR USING ARMS: A LITTLE
MOVING TO AND FROM BED TO CHAIR: A LITTLE
WALKING IN HOSPITAL ROOM: A LITTLE
MOVING FROM LYING ON BACK TO SITTING ON SIDE OF FLAT BED: A LITTLE
SUGGESTED CMS G CODE MODIFIER MOBILITY: CK
MOBILITY SCORE: 19
CLIMB 3 TO 5 STEPS WITH RAILING: A LITTLE

## 2021-03-29 ASSESSMENT — ENCOUNTER SYMPTOMS
HEADACHES: 0
NAUSEA: 0
ABDOMINAL PAIN: 0
FEVER: 0
VOMITING: 0
DEPRESSION: 1
MYALGIAS: 0
COUGH: 0
EYES NEGATIVE: 1
CHILLS: 0
HALLUCINATIONS: 1
FOCAL WEAKNESS: 0

## 2021-03-29 ASSESSMENT — GAIT ASSESSMENTS
ASSISTIVE DEVICE: 4 WHEEL WALKER
GAIT LEVEL OF ASSIST: SUPERVISED
DEVIATION: BRADYKINETIC;SHUFFLED GAIT
DISTANCE (FEET): 250

## 2021-03-29 NOTE — CARE PLAN
Care plan  Problem: Communication  Goal: The ability to communicate needs accurately and effectively will improve  Outcome: PROGRESSING AS EXPECTED     Problem: Safety  Goal: Will remain free from injury  Outcome: PROGRESSING AS EXPECTED  Goal: Will remain free from falls  Outcome: PROGRESSING AS EXPECTED     Problem: Infection  Goal: Will remain free from infection  Outcome: PROGRESSING AS EXPECTED     Problem: Venous Thromboembolism (VTW)/Deep Vein Thrombosis (DVT) Prevention:  Goal: Patient will participate in Venous Thrombosis (VTE)/Deep Vein Thrombosis (DVT)Prevention Measures  Outcome: PROGRESSING AS EXPECTED     Problem: Bowel/Gastric:  Goal: Normal bowel function is maintained or improved  Outcome: PROGRESSING AS EXPECTED  Goal: Will not experience complications related to bowel motility  Outcome: PROGRESSING AS EXPECTED    Problem: Knowledge Deficit  Goal: Knowledge of disease process/condition, treatment plan, diagnostic tests, and medications will improve  Outcome: PROGRESSING AS EXPECTED     Problem: Discharge Barriers/Planning  Goal: Patient's continuum of care needs will be met  Outcome: PROGRESSING AS EXPECTED     Aox3- disoriented at times. Very calm and cooperative. 1:1 Sitter at bedside for SI. Legal hold until 4/1. Pt ast of 2 but turns well in bed- Q check and change/ repo. Colostomy and wound dressings changed 3/27.

## 2021-03-29 NOTE — PROGRESS NOTES
"Assumed care at 0645. Patient in bed, a&ox4. Sitter at bed side to maintain safety. Patient stated he was not \"feeling right\". Patient admitted to active SI and stated his plan was to cut his throat. MD aware. Emotional support provided. Patient walked hallways several times today with sitter. Patient stated he felt better.   "

## 2021-03-29 NOTE — PROGRESS NOTES
Hospital Medicine Daily Progress Note    Date of Service  3/29/2021    Chief Complaint  56 y.o. male admitted 2/9/2021 with syncope    Hospital Course  This is a 56 year-old man with a past medical history significant for atrial fibrillation, secondary hypercoagulable state. Diabetes mellitus with glyco of 5.2 complicated with diabetic neuropathy, history of congestive systolic heart failure, dyslipidemia presented to the ER on 2/9/2021 after he had a syncopal episode.    Patient does have a history of orthostatic hypotension and was started on fludrocortisone.  For the event, patient complained of significant dizziness.  Upon presentation in ER, patient was hypotensive along with bradycardia thus cardiology was consulted and evaluated and started to stopping all AV sanket blocking agent.  During the stay in the hospital, patient orthostatic vital signs continue to remain positive-was a started on midodrine 5 mg every 6 hours    Evaluated in the past by EP and neurology recurrent syncopal episode.  Currently patient is on digoxin 125 cc p.o. daily, along with Eliquis 5 mg p.o. twice daily for atrial fibrillation.    Echocardiogram was obtained which showed EF of 60%, improved from previous echocardiogram obtained on 11/2020.    Patient has h/o spleen injury, splenic flexure perforation in September 2020. S/p colostomy and Interiano's pouch in November 2020. He was at home taking care of his colostomy, however he has become depressed, overwhelmed and can no longer take care of his colostomy independently.    Psych continue to follow the patient, psychiatric medication as per psych recommendations.    Developed hypersensitivity rash and itching due to zosyn.   Switched to ciprofloxacin and treated with benadryl.  BCx resulted as pan-susceptible Klebsiella pneumo.   Discussed with pharmacist Ashley and decided on bactrim.      Interval Problem Update  I evaluated and examined the patient at the bedside.  Labs and imaging  were reviewed. Care plan was discussed with the patient and bedside nurse.    Patient reported increased suicidal ideation.  He stated that he wants to cut his throat per the nurse.  A one-to-one sitter encourage patient to watch positive TV programs.     Per surgery Dr. Desouza, high risk of ostomy reversal given his complexities. Plan for close multidisciplinary outpatient follow-up for surgery when  patient overall medical conditions more optimized.    Legal hold extended 4/1, need to meet court physician next week for further decision.    one-to-one sitter    PT OT recommended SNF, however pt cannot go SNF with legal hold, and he is not strong enough to go inpt pschy, Dc will be difficult.       Consultants/Specialty  Psychiatry- SI  Cardiology - Hypotension and Afib  General surgery    Code Status  Full Code    Disposition  Difficult discharge, PT OT recommended SNF, however pt cannot  go SNF with legal hold, and he is not strong enough to go inpt pschy, Dc will be difficult.     Review of Systems  Review of Systems   Constitutional: Positive for malaise/fatigue. Negative for chills and fever.   HENT: Negative.    Eyes: Negative.    Respiratory: Negative for cough.    Cardiovascular: Negative for chest pain.   Gastrointestinal: Negative for abdominal pain, nausea and vomiting.   Genitourinary: Negative for dysuria.   Musculoskeletal: Negative for myalgias.   Skin: Negative for rash.   Neurological: Negative for focal weakness and headaches.   Psychiatric/Behavioral: Positive for depression, hallucinations and suicidal ideas.        Physical Exam  Temp:  [36.2 °C (97.2 °F)-36.8 °C (98.2 °F)] 36.2 °C (97.2 °F)  Pulse:  [75-99] 79  Resp:  [18-20] 20  BP: (126-150)/(60-80) 126/60  SpO2:  [91 %-94 %] 91 %    Physical Exam  Vitals and nursing note reviewed.   Constitutional:       Appearance: He is ill-appearing.   HENT:      Head: Normocephalic.      Mouth/Throat:      Mouth: Mucous membranes are moist.       Pharynx: Oropharynx is clear.   Eyes:      Pupils: Pupils are equal, round, and reactive to light.   Cardiovascular:      Rate and Rhythm: Normal rate and regular rhythm.      Heart sounds: Normal heart sounds.   Pulmonary:      Effort: Pulmonary effort is normal. No respiratory distress.      Breath sounds: Normal breath sounds. No wheezing.   Abdominal:      General: Abdomen is flat. Bowel sounds are normal.      Comments: Colostoma in place in left abdomen   Musculoskeletal:         General: Normal range of motion.      Cervical back: Normal range of motion.   Skin:     General: Skin is warm.   Neurological:      General: No focal deficit present.      Mental Status: He is alert and oriented to person, place, and time.   Psychiatric:         Mood and Affect: Mood is depressed.         Thought Content: Thought content includes suicidal ideation.         Fluids    Intake/Output Summary (Last 24 hours) at 3/29/2021 1641  Last data filed at 3/29/2021 1440  Gross per 24 hour   Intake 797 ml   Output 1105 ml   Net -308 ml       Laboratory  Recent Labs     03/27/21  0849 03/28/21  0332   WBC 10.5 11.7*   RBC 4.93 4.77   HEMOGLOBIN 14.2 14.0   HEMATOCRIT 43.8 42.7   MCV 88.8 89.5   MCH 28.8 29.4   MCHC 32.4* 32.8*   RDW 48.0 47.8   PLATELETCT 277 251   MPV 9.8 10.6     Recent Labs     03/27/21  0849 03/28/21  0332   SODIUM 135 136   POTASSIUM 4.4 4.6   CHLORIDE 98 102   CO2 28 25   GLUCOSE 107* 115*   BUN 21 22   CREATININE 0.87 0.80   CALCIUM 9.2 8.9                   Imaging  [id number] VIEW   Final Result         No significant interval change.      DX-CHEST-LIMITED (1 VIEW)   Final Result         Diffuse interstitial prominence could relate to mild pulmonary edema or atypical infection      [id number] VIEW   Final Result      Increased colonic gas without definite bowel obstruction.      US-RENAL   Final Result      No evidence of hydronephrosis.      Lobulated kidneys bilaterally.      CT-ABDOMEN-PELVIS WITH    Final Result      1.  There is no evidence of small bowel obstruction.   2.  There is a left lower quadrant ostomy.   3.  There is fluid distention of the colon distal to the surgical material in the left mid descending colon extending all the way to the rectum. There is no pneumatosis or free air.   4.  There is cholelithiasis without biliary dilatation.   5.  There has been interval removal of the drainage catheter anterior to the spleen with minimal hypodense area in the anterior spleen again noted. There is no new fluid collection.      IR-US GUIDED PIV   Final Result    Ultrasound-guided PERIPHERAL IV INSERTION performed by    qualified nursing staff as above.      EC-ECHOCARDIOGRAM COMPLETE W/O CONT   Final Result      DX-LUMBAR SPINE-2 OR 3 VIEWS   Final Result      Moderate compression deformity of T11 is new compared to 2018.      Mild wedge deformity of T12 is unchanged.      Degenerative changes including facet arthropathy.      Mild retrolisthesis of L5 on S1 and L3 on L4.              Assessment/Plan  * Suicidal ideation- (present on admission)  Assessment & Plan  Has history of MDD, recurrent with psychotic features with active SI w/ plan.  Psychiatry consulted, continued legal hold  To be discharged to psychiatric program pending medical clearance and when able to self-care for ostomy    Legal hold extended 4/1, need to meet court physician next week for further decision    Cardiogenic pulmonary edema (HCC)  Assessment & Plan  CXR demonstrates pulmonary vascular congestion / edema  NT-BNP 7828  Due to IV boluses for hypotension and N/V in setting of HFrEF with recovered EF  IV lasix tolerated with improvement in BP and Cr, discontinued 3/14 for severe sepsis and lactic acidosis  3/16: Currently saturating well on room air.    Major depressive disorder, recurrent, severe with psychotic features (HCC)- (present on admission)  Assessment & Plan  Psychiatry and Psychology following  Continue paroxetine,  ziprasidone, and clonazepam    Secondary adrenal insufficiency (HCC)- (present on admission)  Assessment & Plan  Cosyntropin stimulation test reviewed with only a increase in his cortisol going from 14.5 then up to 21.3 after cosyntropin given  Renin 0.1, ACTH 3.4 and slight increase with cosyntropin suggesting secondary AI  Random cortisol 9.9  Needs outpatient endocrinology follow up.  Repeat adrenal function after discharge  Repeat AM BMP    Florinef restarted 3/14 at 0.2 mg daily (2x dose) due to severe sepsis from GNR bacteremia in order to prevent adrenal crisis    Sepsis with acute renal failure without septic shock (HCC)  Assessment & Plan  This is Severe Sepsis Not present on admission  SIRS criteria identified on my evaluation include: Tachycardia, with heart rate greater than 90 BPM and Leukocytosis, with WBC greater than 12,000  Source of infection is GNR bacteremia  Clinical indicators of end organ dysfunction include Systolic blood pressure (SBP) <90 mmHg or mean arterial pressure <65 mmHg and Lactate >2 mmol/L (18.0 mg/dL)  Sepsis protocol initiated  Fluid resuscitation not ordered due to pulmonary edema seen on CXR due to HFrEF  IV antibiotics as appropriate for source of sepsis - Zosyn    BCx x2 (3/13) +GNR in one bottle, awaiting speciation and susceptbilities  UCx added to UA (3/13) that showed pyuria and bacteruria  Cdiff negative (3/12)  Prior microbiology date reviewed, h/o enterococcus and MRSA but currently not positive for GPCs    Reassessment: I have reassessed the patient's hemodynamic status  End organ dysfunction include(s):  Acute kidney failure (SRUTHI)  Cr and BP improved after diuresis yesterday for cardiogenic pulmonary edema  Lactic Acid 2.8, improved with holding diuresis    Orthostatic hypotension- (present on admission)  Assessment & Plan  Multifactorial including adrenal insufficiency, medications (beta blocker, CCB, tamsulosin), diabetic autonomic dysfunction, and venous  insufficiency  EF is noted to be 60%, significant improvement from prior.  Per cardiology, no further cardiac work-up needed  Follow-up with cardiology as an outpatient.  Continue midodrine, PRN IVF    Bacteremia  Assessment & Plan  Due to klebsiella bacteremia  BCx: 1 of 2 bottles +Klebsiella  Cdiff negative  UA: Bacteruria and pyuria  UCx: In progress  CXR with pulmonary edema but no infiltrates  3/16: Currently on Ancef.  3/19: Switch him to Bactrim to complete a 10-day course.    Abdominal bloating  Assessment & Plan  Improved  Had increasing gas output from ostomy  Intermittent episodes of abdominal pain and N/V  CT 3/4 did not demonstrate obstruction nor pneumotosis  KUB 3/12 for recurrent symptoms demonstrated increased gas but no signs of SBO  Simethicone scheduled 80 mg TID  If uncontrolled, would confer with general surgeon about further imaging and discuss empiric treatment for SIBO    Normal anion gap metabolic acidosis  Assessment & Plan  Resolved  Due to bicarbonate loss via ostomy  Repeat AM BMP    Syncope due to orthostatic hypotension- (present on admission)  Assessment & Plan  Likely secondary to orthostatic hypotension  Echo normal with 60% ejection fraction, improved from prior    Paroxysmal atrial fibrillation (HCC)- (present on admission)  Assessment & Plan  CHADSVASC 3 (HTN, CHF, DM).   TTE Feb 2021: EF 60%  Continue metoprolol, digoxin, and eliquis  Discontinued telemetry    Chronic heart failure with preserved ejection fraction (HCC)- (present on admission)  Assessment & Plan  Decompensated based on peripheral and pulmonary edema, mild hyponatremia  IV diuresis initiated 3/13 for pulmonary edema, held 3/14 in setting of lactic acidosis from severe sepsis  Recovered EF 60% on repeat echo.  Continue metoprolol XL  Lisinopril held per Cardiology recommendations due to hypotension    Diabetes mellitus type 2, insulin dependent (HCC)- (present on admission)  Assessment & Plan  A1c 5.2  Diabetic  diet  Restarting accuchecks and SSI due to GNR sepsis  BG goal <200 with bacteremia  Continue atorvastatin    Colostomy present (HCC)- (present on admission)  Assessment & Plan  History of fall at home Nov 2020 on left side with perforated colon and splenic fluid collection/hematoma s/p segmental colectomy, colostomy, mobilization of the splenic flexure, wound vac, and I&D Nov 2020.   Ostomy care consulted, but he was non-participatory  He is engaging in ostomy care with his bedside RNs, encouraged to be observed but do it independently  Patient must be independently able to care for his ostomy before inpatient psychiatry will accept him    Per surgery Dr. Desouza, high risk of ostomy reversal given his complexities. Plan for close multidisciplinary outpatient follow-up for surgery when  patient overall medical conditions more optimized.      Anxiety and depression- (present on admission)  Assessment & Plan  On Legal Hold until 3/18  Psychiatry consulted  Continue paroxetine, ziprasidone, and clonazepam  Would likely benefit from CBT or DBT  3/16: Psychiatry recommending transferring to inpatient psychiatric facility once medically cleared.  However patient has to perform self ostomy care which has been challenging.  Management and  working on best safe discharge plan.    Hypothyroidism- (present on admission)  Assessment & Plan  TSH wnl Feb 2021  Continue home Levothyroxine    Acute renal failure superimposed on stage 3 chronic kidney disease (HCC)- (present on admission)  Assessment & Plan  CKD 3  Had improved with increased midodrine and diuresis, but worsened this morning in setting of holding diuresis for hypotension and lactic acidosis  500 cc NS bolus today, repeat BMP this afternoon  Developed AHRF with pulmonary edema from 1 L bolus, judicious IVF  Diuresis held, continue midodrine  3/16: Discontinue Bactrim as it will worsen kidney functions. Slowly improving.  Avoid nephrotoxins.  Renally  dose all medications.  3/18: Back to baseline.    Debility- (present on admission)  Assessment & Plan  Likely due to depression  PT/OT evaluated, no further skilled needs    Essential hypertension- (present on admission)  Assessment & Plan  Resolved, has been hypotensive this admission in setting of secondary adrenal insufficiency  Continue midodrine    Lower limb amputation, great toe (HCC)- (present on admission)  Assessment & Plan  s/p right great toe amputation 2018    Hypomagnesemia  Assessment & Plan  Mg WNL today  Repeat AM BMP/Mg    Hypokalemia  Assessment & Plan  K 3.2  Lasix held yesterday due to hypotension and lactic acidosis  Continue Kdur 20 daily  Discussed with pharmacy, no additional K today due to risk of hyperkalemia with bactrim  Mg WNL  Repeat AM BMP/Mg    Hyponatremia  Assessment & Plan  Moderate, Na 127  In setting of IV lasix, holding lasix due to severe sepsis with lactic acidosis  500 cc NS trial today, repeat afternoon BMP  If worsening will get Delmis and Uosm  Repeat AM BMP    Obstructive uropathy- (present on admission)  Assessment & Plan  Urinary retention on bladder scans by straight cath.  Zabala catheter was placed temporarily.  Remove with voiding trials per nursing protocol.  Started on Flomax.  3/19: Zabala catheter was removed.  Voiding trials successful so far.    Rash- (present on admission)  Assessment & Plan  Patient developed pruritic drug related rash   Prn Benadryl, steroids   3/18: Resolving.    Chronic venous insufficiency of lower extremity- (present on admission)  Assessment & Plan  Compression stockings    Elevated troponin- (present on admission)  Assessment & Plan  RESOLVED  Stable. Suspect due to demand ischemia. No need to trend further.    Nonischemic cardiomyopathy (HCC)- (present on admission)  Assessment & Plan  Normal coronaries, 2018.  Tachycardia mediated per cardiology  LVEF improved, based on echo 2/10/2021 LVEF 60%    Chest pain- (present on  admission)  Assessment & Plan  Resolved  EKG and Troponins not consistent with ACS    Mixed hyperlipidemia- (present on admission)  Assessment & Plan  Continue atorvastatin    Obesity- (present on admission)  Assessment & Plan  Body mass index is 37.79 kg/m².  Counseled about diet and exercise       VTE prophylaxis: apixaban

## 2021-03-29 NOTE — CARE PLAN
Problem: Potential for Self Harm  Goal: Achieve stable functioning  Outcome: PROGRESSING AS EXPECTED  Note: 1:1 sitter at bedside throughout shift. Frequent assessments done.

## 2021-03-29 NOTE — THERAPY
Physical Therapy   Daily Treatment     Patient Name: Sebastián Castro  Age:  56 y.o., Sex:  male  Medical Record #: [id number]  Today's Date: 3/29/2021     Precautions: Fall Risk    Assessment    Pt has returned back to his baseline level of function. Able to perform gait, transfers, and bed mobility without physical assist. Able to stand from low surface today. Pt doing well with chart on white board tracking his daily walks, provides good external visual cue and motivation. Emphasized to patient how important it is to continue these walks to maintain his current level of function. Pt demonstrated understanding. Goals met. As he is mobilizing at a supervision level no longer requires SNF and is capable of discharging to an inpatient psych facility. No additional acute PT needs.     Plan    Discharge secondary to goals met.    DC Equipment Recommendations: None  Discharge Recommendations: (24/7 for psych needs)           Objective       03/29/21 1529   Cognition    Level of Consciousness Alert   Comments pleasant and motivated   Balance   Sitting Balance (Static) Good   Sitting Balance (Dynamic) Good   Standing Balance (Static) Fair +   Standing Balance (Dynamic) Fair +   Weight Shift Sitting Good   Weight Shift Standing Fair   Comments 4WW   Gait Analysis   Gait Level Of Assist Supervised   Assistive Device 4 Wheel Walker   Distance (Feet) 250   # of Times Distance was Traveled 1   Deviation Bradykinetic;Shuffled Gait   Weight Bearing Status no restrictions   Bed Mobility    Supine to Sit Supervised   Sit to Supine Supervised   Scooting Supervised   Functional Mobility   Sit to Stand Supervised   Short Term Goals    Short Term Goal # 1 Pt will perform supine<->sit with supv within 6 visits in order to return home   Goal Outcome # 1 Goal met   Short Term Goal # 2 Pt will transfer bed<->chair with 4WW with supv within 6 visits in order to return home   Goal Outcome # 2 Goal met   Short Term Goal # 3 Pt will ambulate 100'  with supv within 6 visits in order to return home   Goal Outcome # 3 Goal met   Short Term Goal # 4 Pt will ascend/descend 5 steps with supv within 6 visits in order to enter/exit home   Goal Outcome # 4 Other (see comments)  (NA, no longer returning to original home)

## 2021-03-30 LAB
ANION GAP SERPL CALC-SCNC: 10 MMOL/L (ref 7–16)
BASOPHILS # BLD AUTO: 1.5 % (ref 0–1.8)
BASOPHILS # BLD: 0.15 K/UL (ref 0–0.12)
BUN SERPL-MCNC: 24 MG/DL (ref 8–22)
CALCIUM SERPL-MCNC: 9.6 MG/DL (ref 8.5–10.5)
CHLORIDE SERPL-SCNC: 97 MMOL/L (ref 96–112)
CO2 SERPL-SCNC: 28 MMOL/L (ref 20–33)
CREAT SERPL-MCNC: 0.88 MG/DL (ref 0.5–1.4)
EOSINOPHIL # BLD AUTO: 0.41 K/UL (ref 0–0.51)
EOSINOPHIL NFR BLD: 4.2 % (ref 0–6.9)
ERYTHROCYTE [DISTWIDTH] IN BLOOD BY AUTOMATED COUNT: 48.1 FL (ref 35.9–50)
GLUCOSE BLD-MCNC: 110 MG/DL (ref 65–99)
GLUCOSE BLD-MCNC: 113 MG/DL (ref 65–99)
GLUCOSE BLD-MCNC: 114 MG/DL (ref 65–99)
GLUCOSE BLD-MCNC: 132 MG/DL (ref 65–99)
GLUCOSE BLD-MCNC: 134 MG/DL (ref 65–99)
GLUCOSE SERPL-MCNC: 121 MG/DL (ref 65–99)
HCT VFR BLD AUTO: 47.2 % (ref 42–52)
HGB BLD-MCNC: 14.9 G/DL (ref 14–18)
IMM GRANULOCYTES # BLD AUTO: 0.04 K/UL (ref 0–0.11)
IMM GRANULOCYTES NFR BLD AUTO: 0.4 % (ref 0–0.9)
LYMPHOCYTES # BLD AUTO: 3.6 K/UL (ref 1–4.8)
LYMPHOCYTES NFR BLD: 37 % (ref 22–41)
MCH RBC QN AUTO: 28.4 PG (ref 27–33)
MCHC RBC AUTO-ENTMCNC: 31.6 G/DL (ref 33.7–35.3)
MCV RBC AUTO: 89.9 FL (ref 81.4–97.8)
MONOCYTES # BLD AUTO: 0.61 K/UL (ref 0–0.85)
MONOCYTES NFR BLD AUTO: 6.3 % (ref 0–13.4)
NEUTROPHILS # BLD AUTO: 4.91 K/UL (ref 1.82–7.42)
NEUTROPHILS NFR BLD: 50.6 % (ref 44–72)
NRBC # BLD AUTO: 0 K/UL
NRBC BLD-RTO: 0 /100 WBC
PLATELET # BLD AUTO: 232 K/UL (ref 164–446)
PMV BLD AUTO: 10.8 FL (ref 9–12.9)
POTASSIUM SERPL-SCNC: 4.2 MMOL/L (ref 3.6–5.5)
RBC # BLD AUTO: 5.25 M/UL (ref 4.7–6.1)
SODIUM SERPL-SCNC: 135 MMOL/L (ref 135–145)
WBC # BLD AUTO: 9.7 K/UL (ref 4.8–10.8)

## 2021-03-30 PROCEDURE — 700102 HCHG RX REV CODE 250 W/ 637 OVERRIDE(OP): Performed by: STUDENT IN AN ORGANIZED HEALTH CARE EDUCATION/TRAINING PROGRAM

## 2021-03-30 PROCEDURE — 82962 GLUCOSE BLOOD TEST: CPT | Mod: 91

## 2021-03-30 PROCEDURE — A9270 NON-COVERED ITEM OR SERVICE: HCPCS | Performed by: STUDENT IN AN ORGANIZED HEALTH CARE EDUCATION/TRAINING PROGRAM

## 2021-03-30 PROCEDURE — 36415 COLL VENOUS BLD VENIPUNCTURE: CPT

## 2021-03-30 PROCEDURE — A9270 NON-COVERED ITEM OR SERVICE: HCPCS | Performed by: HOSPITALIST

## 2021-03-30 PROCEDURE — 700102 HCHG RX REV CODE 250 W/ 637 OVERRIDE(OP): Performed by: HOSPITALIST

## 2021-03-30 PROCEDURE — 85025 COMPLETE CBC W/AUTO DIFF WBC: CPT

## 2021-03-30 PROCEDURE — 770001 HCHG ROOM/CARE - MED/SURG/GYN PRIV*

## 2021-03-30 PROCEDURE — 80048 BASIC METABOLIC PNL TOTAL CA: CPT

## 2021-03-30 PROCEDURE — 99232 SBSQ HOSP IP/OBS MODERATE 35: CPT | Performed by: PSYCHIATRY & NEUROLOGY

## 2021-03-30 PROCEDURE — 700102 HCHG RX REV CODE 250 W/ 637 OVERRIDE(OP): Performed by: FAMILY MEDICINE

## 2021-03-30 PROCEDURE — A9270 NON-COVERED ITEM OR SERVICE: HCPCS | Performed by: FAMILY MEDICINE

## 2021-03-30 PROCEDURE — 99232 SBSQ HOSP IP/OBS MODERATE 35: CPT | Performed by: HOSPITALIST

## 2021-03-30 RX ADMIN — PAROXETINE HYDROCHLORIDE 20 MG: 20 TABLET, FILM COATED ORAL at 08:24

## 2021-03-30 RX ADMIN — APIXABAN 5 MG: 5 TABLET, FILM COATED ORAL at 20:28

## 2021-03-30 RX ADMIN — LEVOTHYROXINE SODIUM 112 MCG: 0.11 TABLET ORAL at 05:04

## 2021-03-30 RX ADMIN — CLONAZEPAM 0.5 MG: 0.5 TABLET ORAL at 08:23

## 2021-03-30 RX ADMIN — ZIPRASIDONE HYDROCHLORIDE 60 MG: 60 CAPSULE ORAL at 08:24

## 2021-03-30 RX ADMIN — POTASSIUM CHLORIDE 20 MEQ: 20 TABLET, EXTENDED RELEASE ORAL at 05:04

## 2021-03-30 RX ADMIN — CLONAZEPAM 1 MG: 1 TABLET ORAL at 18:13

## 2021-03-30 RX ADMIN — MIDODRINE HYDROCHLORIDE 5 MG: 5 TABLET ORAL at 18:13

## 2021-03-30 RX ADMIN — DOCUSATE SODIUM 50 MG AND SENNOSIDES 8.6 MG 2 TABLET: 8.6; 5 TABLET, FILM COATED ORAL at 08:23

## 2021-03-30 RX ADMIN — DOCUSATE SODIUM 50 MG AND SENNOSIDES 8.6 MG 2 TABLET: 8.6; 5 TABLET, FILM COATED ORAL at 20:28

## 2021-03-30 RX ADMIN — FLUDROCORTISONE ACETATE 0.1 MG: 0.1 TABLET ORAL at 05:04

## 2021-03-30 RX ADMIN — METOPROLOL SUCCINATE 25 MG: 25 TABLET, EXTENDED RELEASE ORAL at 05:04

## 2021-03-30 RX ADMIN — TAMSULOSIN HYDROCHLORIDE 0.4 MG: 0.4 CAPSULE ORAL at 05:04

## 2021-03-30 RX ADMIN — SIMETHICONE 80 MG: 80 TABLET, CHEWABLE ORAL at 05:04

## 2021-03-30 RX ADMIN — SIMETHICONE 80 MG: 80 TABLET, CHEWABLE ORAL at 18:14

## 2021-03-30 RX ADMIN — MIDODRINE HYDROCHLORIDE 5 MG: 5 TABLET ORAL at 13:31

## 2021-03-30 RX ADMIN — DIGOXIN 125 MCG: 125 TABLET ORAL at 18:14

## 2021-03-30 RX ADMIN — OMEPRAZOLE 20 MG: 20 CAPSULE, DELAYED RELEASE ORAL at 05:04

## 2021-03-30 RX ADMIN — MIDODRINE HYDROCHLORIDE 5 MG: 5 TABLET ORAL at 08:24

## 2021-03-30 RX ADMIN — INSULIN GLARGINE 5 UNITS: 100 INJECTION, SOLUTION SUBCUTANEOUS at 08:24

## 2021-03-30 RX ADMIN — APIXABAN 5 MG: 5 TABLET, FILM COATED ORAL at 08:24

## 2021-03-30 RX ADMIN — ATORVASTATIN CALCIUM 40 MG: 40 TABLET, FILM COATED ORAL at 18:13

## 2021-03-30 RX ADMIN — SIMETHICONE 80 MG: 80 TABLET, CHEWABLE ORAL at 13:31

## 2021-03-30 RX ADMIN — ZIPRASIDONE HYDROCHLORIDE 60 MG: 60 CAPSULE ORAL at 20:28

## 2021-03-30 ASSESSMENT — ENCOUNTER SYMPTOMS
FEVER: 0
HEADACHES: 0
EYES NEGATIVE: 1
FOCAL WEAKNESS: 0
MYALGIAS: 0
CHILLS: 0
ABDOMINAL PAIN: 0
COUGH: 0
VOMITING: 0
DEPRESSION: 1
NAUSEA: 0
HALLUCINATIONS: 0

## 2021-03-30 ASSESSMENT — PAIN DESCRIPTION - PAIN TYPE: TYPE: CHRONIC PAIN

## 2021-03-30 ASSESSMENT — LIFESTYLE VARIABLES: SUBSTANCE_ABUSE: 0

## 2021-03-30 NOTE — DISCHARGE PLANNING
Agency/Facility Name: [facility]  Spoke To: Maria Victoria   Outcome: Referral in review.    Agency/Facility Name: [facility]  Spoke To: Juli  Outcome: Transferred to intake, DPA left voice message for Marsha.

## 2021-03-30 NOTE — CONSULTS
"PSYCHIATRIC FOLLOW-UP:(established)  *Reason for admission: Syncope, hypotension.  Being followed for SI      *Legal Hold Status on Admission:      Extended      Chart reviewed.         *HPI:   Patient states that his depression is worse today, feeling that not having psychotherapy session over the weekend made his mood worse.  Reported he continues to have suicidal ideations with AH.  He states that he is participating more on changing his ostomy bag, however has not been fully independent on that yet.  He states that the issue is in his mind. \"  I am working on that\", stated multiple times, referring on getting fully independent on ranging his ostomy bag.  Patient reported that he likes to be in the hospital setting because he gets the help with the ostomy bag.  He also stated that he would like to have the help until he is able to have a reverse surgery.  I explored the option of possibly going to a SNF, which his showed some excitement and his affect brightened up with that idea.         *Psychiatric Examination:  Vitals:   Vitals:    03/30/21 0821   BP: (!) 92/53   Pulse: 65   Resp: 20   Temp: 36.3 °C (97.4 °F)   SpO2: 95%       General Appearance: Fair grooming, calm and cooperative with good eye contact  Abnormal Movements:   Gait and Posture: None normal lying bed  Speech: Normal volume, tone and rhythm  Thought processes: Linear   associations: Intact  Abnormal or Psychotic Thoughts: Continues to endorse auditory hallucinations  Judgement and Insight: Fair/fair  Orientation: Grossly oriented  Recent and Remote Memory: Intact  Attention Span and Concentration: Intact  Language:   Fund of Knowledge: Fluid test  Mood and Affect:\" Feeling more depressed today\", depressed  SI/HI: Continues to endorse suicidal ideations    *Labs personally reviewed:   Recent Results (from the past 72 hour(s))   ACCU-CHEK GLUCOSE    Collection Time: 03/27/21 12:48 PM   Result Value Ref Range    Glucose - Accu-Ck 119 (H) 65 - 99 " mg/dL   ACCU-CHEK GLUCOSE    Collection Time: 03/27/21  5:43 PM   Result Value Ref Range    Glucose - Accu-Ck 103 (H) 65 - 99 mg/dL   ACCU-CHEK GLUCOSE    Collection Time: 03/27/21  8:44 PM   Result Value Ref Range    Glucose - Accu-Ck 107 (H) 65 - 99 mg/dL   CBC WITH DIFFERENTIAL    Collection Time: 03/28/21  3:32 AM   Result Value Ref Range    WBC 11.7 (H) 4.8 - 10.8 K/uL    RBC 4.77 4.70 - 6.10 M/uL    Hemoglobin 14.0 14.0 - 18.0 g/dL    Hematocrit 42.7 42.0 - 52.0 %    MCV 89.5 81.4 - 97.8 fL    MCH 29.4 27.0 - 33.0 pg    MCHC 32.8 (L) 33.7 - 35.3 g/dL    RDW 47.8 35.9 - 50.0 fL    Platelet Count 251 164 - 446 K/uL    MPV 10.6 9.0 - 12.9 fL    Neutrophils-Polys 50.70 44.00 - 72.00 %    Lymphocytes 35.40 22.00 - 41.00 %    Monocytes 6.30 0.00 - 13.40 %    Eosinophils 6.00 0.00 - 6.90 %    Basophils 1.20 0.00 - 1.80 %    Immature Granulocytes 0.40 0.00 - 0.90 %    Nucleated RBC 0.00 /100 WBC    Neutrophils (Absolute) 5.91 1.82 - 7.42 K/uL    Lymphs (Absolute) 4.13 1.00 - 4.80 K/uL    Monos (Absolute) 0.73 0.00 - 0.85 K/uL    Eos (Absolute) 0.70 (H) 0.00 - 0.51 K/uL    Baso (Absolute) 0.14 (H) 0.00 - 0.12 K/uL    Immature Granulocytes (abs) 0.05 0.00 - 0.11 K/uL    NRBC (Absolute) 0.00 K/uL   Comp Metabolic Panel    Collection Time: 03/28/21  3:32 AM   Result Value Ref Range    Sodium 136 135 - 145 mmol/L    Potassium 4.6 3.6 - 5.5 mmol/L    Chloride 102 96 - 112 mmol/L    Co2 25 20 - 33 mmol/L    Anion Gap 9.0 7.0 - 16.0    Glucose 115 (H) 65 - 99 mg/dL    Bun 22 8 - 22 mg/dL    Creatinine 0.80 0.50 - 1.40 mg/dL    Calcium 8.9 8.5 - 10.5 mg/dL    AST(SGOT) 19 12 - 45 U/L    ALT(SGPT) 20 2 - 50 U/L    Alkaline Phosphatase 105 (H) 30 - 99 U/L    Total Bilirubin 0.4 0.1 - 1.5 mg/dL    Albumin 2.9 (L) 3.2 - 4.9 g/dL    Total Protein 6.2 6.0 - 8.2 g/dL    Globulin 3.3 1.9 - 3.5 g/dL    A-G Ratio 0.9 g/dL   ESTIMATED GFR    Collection Time: 03/28/21  3:32 AM   Result Value Ref Range    GFR If  >60 >60  mL/min/1.73 m 2    GFR If Non African American >60 >60 mL/min/1.73 m 2   ACCU-CHEK GLUCOSE    Collection Time: 03/28/21  8:41 AM   Result Value Ref Range    Glucose - Accu-Ck 115 (H) 65 - 99 mg/dL   ACCU-CHEK GLUCOSE    Collection Time: 03/28/21  5:49 PM   Result Value Ref Range    Glucose - Accu-Ck 135 (H) 65 - 99 mg/dL   ACCU-CHEK GLUCOSE    Collection Time: 03/28/21  9:03 PM   Result Value Ref Range    Glucose - Accu-Ck 127 (H) 65 - 99 mg/dL   ACCU-CHEK GLUCOSE    Collection Time: 03/29/21  8:25 AM   Result Value Ref Range    Glucose - Accu-Ck 122 (H) 65 - 99 mg/dL   ACCU-CHEK GLUCOSE    Collection Time: 03/29/21 12:44 PM   Result Value Ref Range    Glucose - Accu-Ck 123 (H) 65 - 99 mg/dL   ACCU-CHEK GLUCOSE    Collection Time: 03/29/21  5:13 PM   Result Value Ref Range    Glucose - Accu-Ck 97 65 - 99 mg/dL   CBC WITH DIFFERENTIAL    Collection Time: 03/30/21  6:27 AM   Result Value Ref Range    WBC 9.7 4.8 - 10.8 K/uL    RBC 5.25 4.70 - 6.10 M/uL    Hemoglobin 14.9 14.0 - 18.0 g/dL    Hematocrit 47.2 42.0 - 52.0 %    MCV 89.9 81.4 - 97.8 fL    MCH 28.4 27.0 - 33.0 pg    MCHC 31.6 (L) 33.7 - 35.3 g/dL    RDW 48.1 35.9 - 50.0 fL    Platelet Count 232 164 - 446 K/uL    MPV 10.8 9.0 - 12.9 fL    Neutrophils-Polys 50.60 44.00 - 72.00 %    Lymphocytes 37.00 22.00 - 41.00 %    Monocytes 6.30 0.00 - 13.40 %    Eosinophils 4.20 0.00 - 6.90 %    Basophils 1.50 0.00 - 1.80 %    Immature Granulocytes 0.40 0.00 - 0.90 %    Nucleated RBC 0.00 /100 WBC    Neutrophils (Absolute) 4.91 1.82 - 7.42 K/uL    Lymphs (Absolute) 3.60 1.00 - 4.80 K/uL    Monos (Absolute) 0.61 0.00 - 0.85 K/uL    Eos (Absolute) 0.41 0.00 - 0.51 K/uL    Baso (Absolute) 0.15 (H) 0.00 - 0.12 K/uL    Immature Granulocytes (abs) 0.04 0.00 - 0.11 K/uL    NRBC (Absolute) 0.00 K/uL   Basic Metabolic Panel    Collection Time: 03/30/21  6:27 AM   Result Value Ref Range    Sodium 135 135 - 145 mmol/L    Potassium 4.2 3.6 - 5.5 mmol/L    Chloride 97 96 - 112 mmol/L     Co2 28 20 - 33 mmol/L    Glucose 121 (H) 65 - 99 mg/dL    Bun 24 (H) 8 - 22 mg/dL    Creatinine 0.88 0.50 - 1.40 mg/dL    Calcium 9.6 8.5 - 10.5 mg/dL    Anion Gap 10.0 7.0 - 16.0   ESTIMATED GFR    Collection Time: 03/30/21  6:27 AM   Result Value Ref Range    GFR If African American >60 >60 mL/min/1.73 m 2    GFR If Non African American >60 >60 mL/min/1.73 m 2         Assessment:       Dx:  Major depressive disorder, recurrent with mood congruent psychosis    Medical:  -ostomy  -HTN  -pafib  -CHF  -DM2  -hypothyroidism  -orthostatic hypotension  -SRUTHI  -secondary adrenal insufficiency  -leukocytosis  -non ischemic cardiomyopathy          Plan:  1- Legal hold: Extended  2- Psychotropic medications: Continue Paxil 20 mg, Geodon 60 mg twice daily for auditory hallucinations and Klonopin 0.5/1 mg  3- Please transfer pt to inpatient psychiatric hospital when bed is available.  Referrals were sent to [facility] and [facility] today.  If patient is declined, will explore the possibility of considering SNF.  4-continue inpatient psychotherapy with Dr. Fernandez  5- discussed case with Dr. Manoj MD  6- Psychiatry will follow up.     Thank you for the consult.       Sitter: Yes 1:1  Phone: Yes  Visitors: Yes  Personal belongings: Yes    This note was created using voice recognition software (Dragon). The accuracy of the dictation is limited by the abilities of the software. I have reviewed the note prior to signing. However, error related to voice recognition software and /or scribes may still exist and should be interpreted within the appropriate context.

## 2021-03-30 NOTE — PROGRESS NOTES
Assumed care at 0645. Patient in bed, a&ox4. Sitter at bedside. Patient continues to report SI, stating he thinks about cutting his throat. Patient is calm and cooperative with care. Agreeable to walking with staff. Assist x1 with walker. Patient stated that increaing his activity has helped with his thoughts. Will continue to monitor for additional needs.

## 2021-03-30 NOTE — PROGRESS NOTES
[facility] Medicine Daily Progress Note    Date of Service  3/30/2021    Chief Complaint  56 y.o. male admitted 2/9/2021 with syncope    Hospital Course  Patient is a 56 year-old man with a past medical history significant for atrial fibrillation (secondary hypercoagulable state), diabetes mellitus, diabetic neuropathy, systolic congestive heart failure,  anddyslipidemia presented to the ER on 2/9/2021 after he had a syncopal episode.    He has a history of orthostatic hypotension, with ongoing orthostatic changes and was started on fludrocortisone.  For the event, patient complained of significant dizziness. In the ER, patient was hypotensive and bradycardic, therefore cardiology was consulted and per their recommendations all AV sanket blocking agents were stopped.    Echocardiogram was obtained which showed EF of 60%, improved from previous echocardiogram obtained on 11/2020.    He also has history of a splenic flexure perforation in September 2020, which resulted in a colostomy and Interiano's pouch in November 2020. He was at home taking care of his colostomy, however he has become depressed, overwhelmed and states he can no longer take care of his colostomy independently and is suicidal.  He was placed on a legal hold and psychiatry is following.    Interval Problem Update  He is axox3, tells me is still suicidal with a plan to slit his throat.  He reports chronic bilateral knee pain, no other complaints. Good eye contact, no sob.  ROS otherwise negative. He states he is willing to go to [facility]    Discussed with nursing and case mgt.      Consultants/Specialty  Psychiatry- SI  Cardiology - Hypotension and Afib  General surgery    Code Status  Full Code    Disposition  Awaiting acceptance from [facility]  Remains on legal hold    Review of Systems  Review of Systems   Constitutional: Positive for malaise/fatigue. Negative for chills and fever.   HENT: Negative.    Eyes: Negative.    Respiratory:  Negative for cough.    Cardiovascular: Negative for chest pain.   Gastrointestinal: Negative for abdominal pain, nausea and vomiting.   Genitourinary: Negative for dysuria.   Musculoskeletal: Negative for myalgias.   Skin: Negative for rash.   Neurological: Negative for focal weakness and headaches.   Psychiatric/Behavioral: Positive for depression and suicidal ideas. Negative for hallucinations and substance abuse.        Physical Exam  Temp:  [36.1 °C (97 °F)-37.1 °C (98.8 °F)] 36.3 °C (97.4 °F)  Pulse:  [65-99] 65  Resp:  [18-20] 20  BP: ()/(53-87) 92/53  SpO2:  [92 %-95 %] 95 %    Physical Exam  Vitals and nursing note reviewed.   Constitutional:       Appearance: He is ill-appearing.   HENT:      Head: Normocephalic.      Mouth/Throat:      Mouth: Mucous membranes are moist.      Pharynx: Oropharynx is clear.   Eyes:      Pupils: Pupils are equal, round, and reactive to light.   Cardiovascular:      Rate and Rhythm: Normal rate and regular rhythm.      Heart sounds: Normal heart sounds.   Pulmonary:      Effort: Pulmonary effort is normal. No respiratory distress.      Breath sounds: Normal breath sounds. No wheezing.   Abdominal:      General: Abdomen is flat. Bowel sounds are normal.      Comments: Colostomy in place in left abdomen   Musculoskeletal:         General: Normal range of motion.      Cervical back: Normal range of motion.   Skin:     General: Skin is warm.   Neurological:      General: No focal deficit present.      Mental Status: He is alert and oriented to person, place, and time.   Psychiatric:         Mood and Affect: Mood is depressed.         Thought Content: Thought content includes suicidal ideation.         Fluids    Intake/Output Summary (Last 24 hours) at 3/30/2021 1332  Last data filed at 3/30/2021 1000  Gross per 24 hour   Intake 557 ml   Output 950 ml   Net -393 ml       Laboratory  Recent Labs     03/28/21  0332 03/30/21  0627   WBC 11.7* 9.7   RBC 4.77 5.25   HEMOGLOBIN 14.0  14.9   HEMATOCRIT 42.7 47.2   MCV 89.5 89.9   MCH 29.4 28.4   MCHC 32.8* 31.6*   RDW 47.8 48.1   PLATELETCT 251 232   MPV 10.6 10.8     Recent Labs     03/28/21  0332 03/30/21  0627   SODIUM 136 135   POTASSIUM 4.6 4.2   CHLORIDE 102 97   CO2 25 28   GLUCOSE 115* 121*   BUN 22 24*   CREATININE 0.80 0.88   CALCIUM 8.9 9.6                   Imaging  [id number] VIEW   Final Result         No significant interval change.      DX-CHEST-LIMITED (1 VIEW)   Final Result         Diffuse interstitial prominence could relate to mild pulmonary edema or atypical infection      [id number] VIEW   Final Result      Increased colonic gas without definite bowel obstruction.      US-RENAL   Final Result      No evidence of hydronephrosis.      Lobulated kidneys bilaterally.      CT-ABDOMEN-PELVIS WITH   Final Result      1.  There is no evidence of small bowel obstruction.   2.  There is a left lower quadrant ostomy.   3.  There is fluid distention of the colon distal to the surgical material in the left mid descending colon extending all the way to the rectum. There is no pneumatosis or free air.   4.  There is cholelithiasis without biliary dilatation.   5.  There has been interval removal of the drainage catheter anterior to the spleen with minimal hypodense area in the anterior spleen again noted. There is no new fluid collection.      IR-US GUIDED PIV   Final Result    Ultrasound-guided PERIPHERAL IV INSERTION performed by    qualified nursing staff as above.      EC-ECHOCARDIOGRAM COMPLETE W/O CONT   Final Result      DX-LUMBAR SPINE-2 OR 3 VIEWS   Final Result      Moderate compression deformity of T11 is new compared to 2018.      Mild wedge deformity of T12 is unchanged.      Degenerative changes including facet arthropathy.      Mild retrolisthesis of L5 on S1 and L3 on L4.              Assessment/Plan  * Suicidal ideation- (present on admission)  Assessment & Plan  Has history of MDD, recurrent with psychotic features  with active SI w/ plan.  Psychiatry consulted, continued legal hold  To be discharged to psychiatric program pending medical clearance and when able to self-care for ostomy    Legal hold extended 4/1    Cardiogenic pulmonary edema (HCC)  Assessment & Plan  CXR demonstrates pulmonary vascular congestion / edema  NT-BNP 7828  Now clinically resolved  Stable on RA  following    Major depressive disorder, recurrent, severe with psychotic features (HCC)- (present on admission)  Assessment & Plan  Psychiatry and Psychology following  Continue paroxetine, ziprasidone, and clonazepam    Secondary adrenal insufficiency (HCC)- (present on admission)  Assessment & Plan  Cosyntropin stimulation test reviewed with only a increase in his cortisol going from 14.5 then up to 21.3 after cosyntropin given  Renin 0.1, ACTH 3.4 and slight increase with cosyntropin suggesting secondary AI  Random cortisol 9.9  Needs outpatient endocrinology follow up.  Repeat adrenal function after discharge  Repeat AM BMP    Florinef restarted 3/14 at 0.2 mg daily (2x dose) due to severe sepsis from GNR bacteremia in order to prevent adrenal crisis    Sepsis with acute renal failure without septic shock (HCC)  Assessment & Plan  This is Severe Sepsis Not present on admission  SIRS criteria identified on my evaluation include: Tachycardia, with heart rate greater than 90 BPM and Leukocytosis, with WBC greater than 12,000  Source of infection is GNR bacteremia  Clinical indicators of end organ dysfunction include Systolic blood pressure (SBP) <90 mmHg or mean arterial pressure <65 mmHg and Lactate >2 mmol/L (18.0 mg/dL)  Sepsis protocol initiated  Fluid resuscitation not ordered due to pulmonary edema seen on CXR due to HFrEF  IV antibiotics as appropriate for source of sepsis - Zosyn    BCx x2 (3/13) +GNR in one bottle, awaiting speciation and susceptbilities  UCx added to UA (3/13) that showed pyuria and bacteruria  Cdiff negative (3/12)  Prior  microbiology date reviewed, h/o enterococcus and MRSA but currently not positive for GPCs    resolved    Orthostatic hypotension- (present on admission)  Assessment & Plan  Multifactorial including adrenal insufficiency, medications (beta blocker, CCB, tamsulosin), diabetic autonomic dysfunction, and venous insufficiency  EF is noted to be 60%, significant improvement from prior.  Per cardiology, no further cardiac work-up needed  Follow-up with cardiology as an outpatient.  Continue midodrine, PRN IVF    Bacteremia  Assessment & Plan  Due to klebsiella bacteremia  BCx: 1 of 2 bottles +Klebsiella  Cdiff negative  UA: Bacteruria and pyuria  Bactrim to complete a 10-day course    Abdominal bloating  Assessment & Plan  resolving  Had increasing gas output from ostomy  Intermittent episodes of abdominal pain and N/V  CT 3/4 did not demonstrate obstruction nor pneumotosis  KUB 3/12 for recurrent symptoms demonstrated increased gas but no signs of SBO  Simethicone scheduled 80 mg TID  Continue supportive care    Normal anion gap metabolic acidosis  Assessment & Plan  Resolved  Due to bicarbonate loss via ostomy      Syncope due to orthostatic hypotension- (present on admission)  Assessment & Plan  Likely secondary to orthostatic hypotension  Echo normal with 60% ejection fraction, improved from prior    Paroxysmal atrial fibrillation (HCC)- (present on admission)  Assessment & Plan  CHADSVASC 3 (HTN, CHF, DM).   TTE Feb 2021: EF 60%  Continue metoprolol, digoxin, and eliquis  Discontinued telemetry    Chronic heart failure with preserved ejection fraction (HCC)- (present on admission)  Assessment & Plan  Decompensated based on peripheral and pulmonary edema, mild hyponatremia  IV diuresis initiated 3/13 for pulmonary edema, held 3/14 in setting of lactic acidosis from severe sepsis  Recovered EF 60% on repeat echo.  Continue metoprolol XL  Lisinopril held per Cardiology recommendations due to hypotension    Diabetes  mellitus type 2, insulin dependent (HCC)- (present on admission)  Assessment & Plan  A1c 5.2  Diabetic diet  Restarting accuchecks and SSI due to GNR sepsis  BG goal <200 with bacteremia  Continue atorvastatin    Colostomy present (HCC)- (present on admission)  Assessment & Plan  History of fall at home Nov 2020 on left side with perforated colon and splenic fluid collection/hematoma s/p segmental colectomy, colostomy, mobilization of the splenic flexure, wound vac, and I&D Nov 2020.   Ostomy care consulted, but he was non-participatory  He is engaging in ostomy care with his bedside RNs, encouraged to be observed but do it independently  Patient must be independently able to care for his ostomy before inpatient psychiatry will accept him    Per surgery Dr. Desouza, high risk of ostomy reversal given his complexities. Plan for close multidisciplinary outpatient follow-up for surgery when  patient overall medical conditions more optimized.      Anxiety and depression- (present on admission)  Assessment & Plan  Legal hold extended through 4/1  Psychiatry following  Continue paroxetine, ziprasidone, and clonazepam        Hypothyroidism- (present on admission)  Assessment & Plan  TSH wnl Feb 2021  Continue home Levothyroxine    Acute renal failure superimposed on stage 3 chronic kidney disease (HCC)- (present on admission)  Assessment & Plan  CKD 3  At baseline  Following intermittently    Debility- (present on admission)  Assessment & Plan  Likely due to depression  PT/OT evaluated, no further skilled needs    Essential hypertension- (present on admission)  Assessment & Plan  Resolved, has been hypotensive this admission in setting of secondary adrenal insufficiency  Continue midodrine    Lower limb amputation, great toe (HCC)- (present on admission)  Assessment & Plan  s/p right great toe amputation 2018    Hypomagnesemia  Assessment & Plan  Resolved  replaced    Hypokalemia  Assessment &  Plan  resolved    Hyponatremia  Assessment & Plan  resolved    Obstructive uropathy- (present on admission)  Assessment & Plan  Urinary retention on bladder scans by straight cath.  Frye catheter was placed temporarily.  Remove with voiding trials per nursing protocol.  Started on Flomax.  Voiding without frye    Rash- (present on admission)  Assessment & Plan  Patient developed pruritic drug related rash   resolved    Chronic venous insufficiency of lower extremity- (present on admission)  Assessment & Plan  Compression stockings    Elevated troponin- (present on admission)  Assessment & Plan  Resolved  Suspect due to demand ischemia. No need to trend further.    Nonischemic cardiomyopathy (HCC)- (present on admission)  Assessment & Plan  Normal coronaries, 2018.  Tachycardia mediated per cardiology  LVEF improved, based on echo 2/10/2021 LVEF 60%    Chest pain- (present on admission)  Assessment & Plan  Resolved  EKG and Troponins not consistent with ACS    Mixed hyperlipidemia- (present on admission)  Assessment & Plan  Continue atorvastatin    Obesity- (present on admission)  Assessment & Plan  Body mass index is 37.79 kg/m².  Counseled about diet and exercise       VTE prophylaxis: apixaban

## 2021-03-30 NOTE — DISCHARGE PLANNING
Updated referrals sent to [facility]

## 2021-03-30 NOTE — DISCHARGE PLANNING
Agency/Facility Name: [facility]  Outcome: Received fax notification, patient declined due to no accepting provider.

## 2021-03-30 NOTE — DISCHARGE PLANNING
Anticipated Discharge Disposition:   Inpatient Psych    Action:   Discussed discharge planning needs. Per MD, pt is medically cleared, will need inpatient psych, pending acceptance. DPA to follow up, will resend updated referrals.     Barriers to Discharge:   Inpatient Psych acceptance    Plan:   Follow up with DPA.  Hospital Care Management will continue to follow and assist with discharge planning needs.

## 2021-03-30 NOTE — DISCHARGE PLANNING
Legal Hold    Referral: Legal Hold Court     Intervention: Pt presented for legal hold meeting with  via video conferencing.  advised pt will meet with court MD's via telemedicine monitor to contest the legal hold.      Plan: Pt will present to telemedicine mental health to meet with court physicians 3/31. Will call bedside RN once time has been determined.

## 2021-03-30 NOTE — DISCHARGE PLANNING
Anticipated Discharge Disposition:   Inpatient Psych    Action:   RN BRANDON called ELISA Bryson CM [facility] [phone]. Updated Laey of plan for Inpatient Psych, mostly declined. Adelaide to email this RN CM a list of inpatient psych in [facility] for possible choices.    Barriers to Discharge:   Additional inpatient choices from insurance    Plan:   Hospital Care Management will continue to follow and assist with discharge planning needs.

## 2021-03-30 NOTE — DISCHARGE PLANNING
Updated referrals sent to [facility], [facility] and [facility] via manual fax @ [phone]

## 2021-03-30 NOTE — CARE PLAN
Patients vital signs stable overnight. Sitter at bedside as patient is on a legal hold and continues to have intermittent thoughts to harm himself. When asked if he was having thoughts of self harm; he replies I still have the thoughts but I will tell you if the thoughts get worse. No other overnight events to report.

## 2021-03-31 LAB
GLUCOSE BLD-MCNC: 116 MG/DL (ref 65–99)
GLUCOSE BLD-MCNC: 134 MG/DL (ref 65–99)
GLUCOSE BLD-MCNC: 138 MG/DL (ref 65–99)

## 2021-03-31 PROCEDURE — A9270 NON-COVERED ITEM OR SERVICE: HCPCS | Performed by: STUDENT IN AN ORGANIZED HEALTH CARE EDUCATION/TRAINING PROGRAM

## 2021-03-31 PROCEDURE — 99232 SBSQ HOSP IP/OBS MODERATE 35: CPT | Performed by: HOSPITALIST

## 2021-03-31 PROCEDURE — 700102 HCHG RX REV CODE 250 W/ 637 OVERRIDE(OP): Performed by: STUDENT IN AN ORGANIZED HEALTH CARE EDUCATION/TRAINING PROGRAM

## 2021-03-31 PROCEDURE — 770001 HCHG ROOM/CARE - MED/SURG/GYN PRIV*

## 2021-03-31 PROCEDURE — 82962 GLUCOSE BLOOD TEST: CPT

## 2021-03-31 PROCEDURE — 302111 WAFER OST 2.25IN N IMG RD 2 PC (BARRIER): Performed by: HOSPITALIST

## 2021-03-31 PROCEDURE — 700102 HCHG RX REV CODE 250 W/ 637 OVERRIDE(OP): Performed by: FAMILY MEDICINE

## 2021-03-31 PROCEDURE — A9270 NON-COVERED ITEM OR SERVICE: HCPCS | Performed by: HOSPITALIST

## 2021-03-31 PROCEDURE — 302098 PASTE RING (FLAT): Performed by: HOSPITALIST

## 2021-03-31 PROCEDURE — 700102 HCHG RX REV CODE 250 W/ 637 OVERRIDE(OP): Performed by: HOSPITALIST

## 2021-03-31 PROCEDURE — A9270 NON-COVERED ITEM OR SERVICE: HCPCS | Performed by: FAMILY MEDICINE

## 2021-03-31 PROCEDURE — 302102 BAG OST N IMG 2.25IN 2PC (FECAL): Performed by: HOSPITALIST

## 2021-03-31 RX ADMIN — MIDODRINE HYDROCHLORIDE 5 MG: 5 TABLET ORAL at 12:55

## 2021-03-31 RX ADMIN — ZIPRASIDONE HYDROCHLORIDE 60 MG: 60 CAPSULE ORAL at 08:36

## 2021-03-31 RX ADMIN — SIMETHICONE 80 MG: 80 TABLET, CHEWABLE ORAL at 05:22

## 2021-03-31 RX ADMIN — CLONAZEPAM 0.5 MG: 0.5 TABLET ORAL at 08:36

## 2021-03-31 RX ADMIN — POTASSIUM CHLORIDE 20 MEQ: 20 TABLET, EXTENDED RELEASE ORAL at 05:22

## 2021-03-31 RX ADMIN — MIDODRINE HYDROCHLORIDE 5 MG: 5 TABLET ORAL at 08:36

## 2021-03-31 RX ADMIN — FLUDROCORTISONE ACETATE 0.1 MG: 0.1 TABLET ORAL at 05:21

## 2021-03-31 RX ADMIN — LEVOTHYROXINE SODIUM 112 MCG: 0.11 TABLET ORAL at 05:21

## 2021-03-31 RX ADMIN — TAMSULOSIN HYDROCHLORIDE 0.4 MG: 0.4 CAPSULE ORAL at 05:21

## 2021-03-31 RX ADMIN — INSULIN GLARGINE 5 UNITS: 100 INJECTION, SOLUTION SUBCUTANEOUS at 08:42

## 2021-03-31 RX ADMIN — METOPROLOL SUCCINATE 25 MG: 25 TABLET, EXTENDED RELEASE ORAL at 05:21

## 2021-03-31 RX ADMIN — MIDODRINE HYDROCHLORIDE 5 MG: 5 TABLET ORAL at 18:32

## 2021-03-31 RX ADMIN — CLONAZEPAM 1 MG: 1 TABLET ORAL at 18:32

## 2021-03-31 RX ADMIN — APIXABAN 5 MG: 5 TABLET, FILM COATED ORAL at 21:27

## 2021-03-31 RX ADMIN — SIMETHICONE 80 MG: 80 TABLET, CHEWABLE ORAL at 18:32

## 2021-03-31 RX ADMIN — OMEPRAZOLE 20 MG: 20 CAPSULE, DELAYED RELEASE ORAL at 05:22

## 2021-03-31 RX ADMIN — APIXABAN 5 MG: 5 TABLET, FILM COATED ORAL at 08:36

## 2021-03-31 RX ADMIN — PAROXETINE HYDROCHLORIDE 20 MG: 20 TABLET, FILM COATED ORAL at 08:36

## 2021-03-31 RX ADMIN — ATORVASTATIN CALCIUM 40 MG: 40 TABLET, FILM COATED ORAL at 18:32

## 2021-03-31 RX ADMIN — DOCUSATE SODIUM 50 MG AND SENNOSIDES 8.6 MG 2 TABLET: 8.6; 5 TABLET, FILM COATED ORAL at 08:35

## 2021-03-31 RX ADMIN — DIGOXIN 125 MCG: 125 TABLET ORAL at 21:27

## 2021-03-31 RX ADMIN — ZIPRASIDONE HYDROCHLORIDE 60 MG: 60 CAPSULE ORAL at 21:28

## 2021-03-31 RX ADMIN — SIMETHICONE 80 MG: 80 TABLET, CHEWABLE ORAL at 12:55

## 2021-03-31 RX ADMIN — ACETAMINOPHEN 650 MG: 325 TABLET, FILM COATED ORAL at 08:43

## 2021-03-31 RX ADMIN — DOCUSATE SODIUM 50 MG AND SENNOSIDES 8.6 MG 2 TABLET: 8.6; 5 TABLET, FILM COATED ORAL at 21:28

## 2021-03-31 ASSESSMENT — LIFESTYLE VARIABLES: SUBSTANCE_ABUSE: 0

## 2021-03-31 ASSESSMENT — ENCOUNTER SYMPTOMS
NAUSEA: 0
HEADACHES: 0
CHILLS: 0
VOMITING: 0
DEPRESSION: 1
FEVER: 0
MYALGIAS: 0
HALLUCINATIONS: 0
FOCAL WEAKNESS: 0
EYES NEGATIVE: 1
COUGH: 0
ABDOMINAL PAIN: 0

## 2021-03-31 NOTE — DISCHARGE PLANNING
Legal Hold     Referral: Legal Hold Court     Intervention: Pt met with court MD's via telemedicine monitor regarding his legal hold status.      Court MD's did not release pt from legal hold, pt was advised will need to meet with District  4/1. Pt waived his right to a court hearing, pt has voluntarily agreed to a court ordered commitment. Once paperwork is received by the court will send to [facility] and scan into pt's chart.

## 2021-03-31 NOTE — DISCHARGE PLANNING
Agency/Facility Name: [facility]  Spoke To: Teodora  Outcome: Referral in review.     DPA spoke to Veronika @ [facility], patient declined due to medical complexity/care exceeds capacity.

## 2021-03-31 NOTE — CARE PLAN
Patient vital signs stable overnight. Patient interacting with staff appropriately. Sitter at bedside as ordered. Patient reports he still has thoughts of hurting himself but not of killing himself.  Patient would not go into detail; but agreed to let staff know if he felt like he was wanting to hurt himself. Safety agreement made.

## 2021-03-31 NOTE — DISCHARGE PLANNING
Spoke to  Jonas regarding patient's meeting with the court doctors today. Patient has been scheduled to meet with court doctors via telemedicine in the [address] room at 1145. Let ELISA Lares know of upcoming meeting this morning.

## 2021-03-31 NOTE — DISCHARGE PLANNING
Referrals re-sent to [facility], [facility] and [place] via manual fax @ [phone]. Faxes from yesterday failed.

## 2021-03-31 NOTE — PROGRESS NOTES
"Hospital Medicine Daily Progress Note    Date of Service  3/31/2021    Chief Complaint  56 y.o. male admitted 2/9/2021 with syncope    Hospital Course  Patient is a 56 year-old man with a past medical history significant for atrial fibrillation (secondary hypercoagulable state), diabetes mellitus, diabetic neuropathy, systolic congestive heart failure,  anddyslipidemia presented to the ER on 2/9/2021 after he had a syncopal episode.    He has a history of orthostatic hypotension, with ongoing orthostatic changes and was started on fludrocortisone.  For the event, patient complained of significant dizziness. In the ER, patient was hypotensive and bradycardic, therefore cardiology was consulted and per their recommendations all AV sanket blocking agents were stopped.    Echocardiogram was obtained which showed EF of 60%, improved from previous echocardiogram obtained on 11/2020.    He also has history of a splenic flexure perforation in September 2020, which resulted in a colostomy and Interiano's pouch in November 2020. He was at home taking care of his colostomy, however he has become depressed, overwhelmed and states he can no longer take care of his colostomy independently and is suicidal.  He was placed on a legal hold and psychiatry is following.    Interval Problem Update  He reports \"pain all over, I always have it\", rated 3/10.  He tells me that he is \"still suicidal\" but says it with an upbeat demeanor.  He tells me he still has frequent thoughts of slitting his throat with a knife. When asked if there are any activities that make like living he answered \"I guess fishing at [place]\".  Axox3, sitter at bedside.  ROS otherwise negative.  Discussed with nursing and case mgt.    Consultants/Specialty  Psychiatry- SI  Cardiology   General surgery    Code Status  Full Code    Disposition  Awaiting acceptance from psych facility  Remains on legal hold, court date scheduled for today    Review of Systems  Review " of Systems   Constitutional: Positive for malaise/fatigue. Negative for chills and fever.   HENT: Negative.    Eyes: Negative.    Respiratory: Negative for cough.    Cardiovascular: Negative for chest pain.   Gastrointestinal: Negative for abdominal pain, nausea and vomiting.   Genitourinary: Negative for dysuria.   Musculoskeletal: Negative for myalgias.   Skin: Negative for rash.   Neurological: Negative for focal weakness and headaches.   Psychiatric/Behavioral: Positive for depression and suicidal ideas. Negative for hallucinations and substance abuse.        Physical Exam  Temp:  [36.2 °C (97.2 °F)-36.7 °C (98 °F)] 36.7 °C (98 °F)  Pulse:  [66-93] 66  Resp:  [16-20] 16  BP: (110-135)/(54-61) 135/61  SpO2:  [92 %-96 %] 93 %    Physical Exam  Vitals and nursing note reviewed.   Constitutional:       Appearance: He is ill-appearing.   HENT:      Head: Normocephalic.      Mouth/Throat:      Mouth: Mucous membranes are moist.      Pharynx: Oropharynx is clear.   Eyes:      Pupils: Pupils are equal, round, and reactive to light.   Cardiovascular:      Rate and Rhythm: Normal rate and regular rhythm.      Heart sounds: Normal heart sounds.   Pulmonary:      Effort: Pulmonary effort is normal. No respiratory distress.      Breath sounds: Normal breath sounds. No wheezing.   Abdominal:      General: Abdomen is flat. Bowel sounds are normal.      Comments: Colostomy in place in left abdomen   Musculoskeletal:         General: Normal range of motion.      Cervical back: Normal range of motion.   Skin:     General: Skin is warm.   Neurological:      General: No focal deficit present.      Mental Status: He is alert and oriented to person, place, and time.   Psychiatric:         Mood and Affect: Mood is depressed.         Thought Content: Thought content includes suicidal ideation.         Fluids    Intake/Output Summary (Last 24 hours) at 3/31/2021 1213  Last data filed at 3/31/2021 0516  Gross per 24 hour   Intake --    Output 1050 ml   Net -1050 ml       Laboratory  Recent Labs     03/30/21  0627   WBC 9.7   RBC 5.25   HEMOGLOBIN 14.9   HEMATOCRIT 47.2   MCV 89.9   MCH 28.4   MCHC 31.6*   RDW 48.1   PLATELETCT 232   MPV 10.8     Recent Labs     03/30/21  0627   SODIUM 135   POTASSIUM 4.2   CHLORIDE 97   CO2 28   GLUCOSE 121*   BUN 24*   CREATININE 0.88   CALCIUM 9.6                   Imaging  [id number] VIEW   Final Result         No significant interval change.      DX-CHEST-LIMITED (1 VIEW)   Final Result         Diffuse interstitial prominence could relate to mild pulmonary edema or atypical infection      [id number] VIEW   Final Result      Increased colonic gas without definite bowel obstruction.      US-RENAL   Final Result      No evidence of hydronephrosis.      Lobulated kidneys bilaterally.      CT-ABDOMEN-PELVIS WITH   Final Result      1.  There is no evidence of small bowel obstruction.   2.  There is a left lower quadrant ostomy.   3.  There is fluid distention of the colon distal to the surgical material in the left mid descending colon extending all the way to the rectum. There is no pneumatosis or free air.   4.  There is cholelithiasis without biliary dilatation.   5.  There has been interval removal of the drainage catheter anterior to the spleen with minimal hypodense area in the anterior spleen again noted. There is no new fluid collection.      IR-US GUIDED PIV   Final Result    Ultrasound-guided PERIPHERAL IV INSERTION performed by    qualified nursing staff as above.      EC-ECHOCARDIOGRAM COMPLETE W/O CONT   Final Result      DX-LUMBAR SPINE-2 OR 3 VIEWS   Final Result      Moderate compression deformity of T11 is new compared to 2018.      Mild wedge deformity of T12 is unchanged.      Degenerative changes including facet arthropathy.      Mild retrolisthesis of L5 on S1 and L3 on L4.              Assessment/Plan  * Suicidal ideation- (present on admission)  Assessment & Plan  Has history of MDD,  recurrent with psychotic features with active SI w/ plan.  Psychiatry consulted, continued legal hold  To be discharged to psychiatric program pending medical clearance and when able to self-care for ostomy    Legal hold extended 4/1    Cardiogenic pulmonary edema (HCC)  Assessment & Plan  CXR demonstrates pulmonary vascular congestion / edema  NT-BNP 7828  Now clinically resolved  Stable on RA  following    Major depressive disorder, recurrent, severe with psychotic features (HCC)- (present on admission)  Assessment & Plan  Psychiatry and Psychology following  Continue paroxetine, ziprasidone, and clonazepam    Secondary adrenal insufficiency (HCC)- (present on admission)  Assessment & Plan  Cosyntropin stimulation test reviewed with only a increase in his cortisol going from 14.5 then up to 21.3 after cosyntropin given  Renin 0.1, ACTH 3.4 and slight increase with cosyntropin suggesting secondary AI  Random cortisol 9.9  Needs outpatient endocrinology follow up.  Repeat adrenal function after discharge  Repeat AM BMP    Florinef restarted 3/14 at 0.2 mg daily (2x dose) due to severe sepsis from GNR bacteremia in order to prevent adrenal crisis    Sepsis with acute renal failure without septic shock (HCC)  Assessment & Plan  This is Severe Sepsis Not present on admission  SIRS criteria identified on my evaluation include: Tachycardia, with heart rate greater than 90 BPM and Leukocytosis, with WBC greater than 12,000  Source of infection is GNR bacteremia  Clinical indicators of end organ dysfunction include Systolic blood pressure (SBP) <90 mmHg or mean arterial pressure <65 mmHg and Lactate >2 mmol/L (18.0 mg/dL)  Sepsis protocol initiated  Fluid resuscitation not ordered due to pulmonary edema seen on CXR due to HFrEF  IV antibiotics as appropriate for source of sepsis - Zosyn    BCx x2 (3/13) +GNR in one bottle, awaiting speciation and susceptbilities  UCx added to UA (3/13) that showed pyuria and  bacteruria  Cdiff negative (3/12)  Prior microbiology date reviewed, h/o enterococcus and MRSA but currently not positive for GPCs    resolved    Orthostatic hypotension- (present on admission)  Assessment & Plan  Multifactorial including adrenal insufficiency, medications (beta blocker, CCB, tamsulosin), diabetic autonomic dysfunction, and venous insufficiency  EF is noted to be 60%, significant improvement from prior.  Per cardiology, no further cardiac work-up needed  Follow-up with cardiology as an outpatient.  Continue midodrine, PRN IVF    Bacteremia  Assessment & Plan  Due to klebsiella bacteremia  BCx: 1 of 2 bottles +Klebsiella  Cdiff negative  UA: Bacteruria and pyuria  Bactrim to complete a 10-day course    Abdominal bloating  Assessment & Plan  resolved  Had increasing gas output from ostomy  Intermittent episodes of abdominal pain and N/V  CT 3/4 did not demonstrate obstruction nor pneumotosis  KUB 3/12 for recurrent symptoms demonstrated increased gas but no signs of SBO  Simethicone scheduled 80 mg TID  Continue supportive care    Normal anion gap metabolic acidosis  Assessment & Plan  Resolved  Due to bicarbonate loss via ostomy      Syncope due to orthostatic hypotension- (present on admission)  Assessment & Plan  Likely secondary to orthostatic hypotension  Echo normal with 60% ejection fraction, improved from prior    Paroxysmal atrial fibrillation (HCC)- (present on admission)  Assessment & Plan  CHADSVASC 3 (HTN, CHF, DM).   TTE Feb 2021: EF 60%  Continue metoprolol, digoxin, and eliquis  Discontinued telemetry    Chronic heart failure with preserved ejection fraction (HCC)- (present on admission)  Assessment & Plan  Decompensated based on peripheral and pulmonary edema, mild hyponatremia  IV diuresis initiated 3/13 for pulmonary edema, held 3/14 in setting of lactic acidosis from severe sepsis  Recovered EF 60% on repeat echo.  Continue metoprolol XL  Lisinopril held per Cardiology  recommendations due to hypotension    Diabetes mellitus type 2, insulin dependent (HCC)- (present on admission)  Assessment & Plan  A1c 5.2  Diabetic diet  Restarting accuchecks and SSI due to GNR sepsis  BG goal <200 with bacteremia  Continue atorvastatin    Colostomy present (HCC)- (present on admission)  Assessment & Plan  History of fall at home Nov 2020 on left side with perforated colon and splenic fluid collection/hematoma s/p segmental colectomy, colostomy, mobilization of the splenic flexure, wound vac, and I&D Nov 2020.   Ostomy care consulted, but he was non-participatory  He is engaging in ostomy care with his bedside RNs, encouraged to be observed but do it independently  Patient must be independently able to care for his ostomy before inpatient psychiatry will accept him    Per surgery Dr. Desouza, high risk of ostomy reversal given his complexities. Plan for close multidisciplinary outpatient follow-up for surgery when  patient overall medical conditions more optimized.      Anxiety and depression- (present on admission)  Assessment & Plan  Legal hold extended through 4/1- court date today  Psychiatry following  Continue paroxetine, ziprasidone, and clonazepam        Hypothyroidism- (present on admission)  Assessment & Plan  TSH wnl Feb 2021  Continue home Levothyroxine    Acute renal failure superimposed on stage 3 chronic kidney disease (HCC)- (present on admission)  Assessment & Plan  CKD 3  At baseline  Following intermittently    Debility- (present on admission)  Assessment & Plan  Likely due to depression  PT/OT evaluated, no further skilled needs    Essential hypertension- (present on admission)  Assessment & Plan  Resolved, has been hypotensive this admission in setting of secondary adrenal insufficiency  Continue midodrine    Lower limb amputation, great toe (HCC)- (present on admission)  Assessment & Plan  s/p right great toe amputation 2018    Hypomagnesemia  Assessment &  Plan  Resolved  replaced    Hypokalemia  Assessment & Plan  resolved    Hyponatremia  Assessment & Plan  resolved    Obstructive uropathy- (present on admission)  Assessment & Plan  Urinary retention on bladder scans by straight cath.  Frye catheter was placed temporarily.  Remove with voiding trials per nursing protocol.  Started on Flomax.  Voiding without frye    Rash- (present on admission)  Assessment & Plan  Patient developed pruritic drug related rash   resolved    Chronic venous insufficiency of lower extremity- (present on admission)  Assessment & Plan  Continue compression stockings    Elevated troponin- (present on admission)  Assessment & Plan  Resolved  Suspect due to demand ischemia. No need to trend further.    Nonischemic cardiomyopathy (HCC)- (present on admission)  Assessment & Plan  Normal coronaries, 2018.  Tachycardia mediated per cardiology  LVEF improved, based on echo 2/10/2021 LVEF 60%    Chest pain- (present on admission)  Assessment & Plan  Resolved  EKG and Troponins not consistent with ACS    Mixed hyperlipidemia- (present on admission)  Assessment & Plan  Continue atorvastatin    Obesity- (present on admission)  Assessment & Plan  Body mass index is 37.79 kg/m².  Counseled about diet and exercise       VTE prophylaxis: apixaban

## 2021-03-31 NOTE — DISCHARGE PLANNING
Agency/Facility Name: [facility]  Spoke To: Gonzalo  Outcome: Patient declined, too medically complex.    Agency/Facility Name: [facility]  Spoke To: Veronika  Outcome: Requested referral be refaxed. Sent @ [phone].    Agency/Facility Name: [facility]  Outcome: Left voice message for admissions regarding referral, requested a call back.    DPA spoke to Jorgito @ [facility], per supervisor patient will be put on waitlist, they require ostomy to be in place for at least 6 months before accepting patient at facility.

## 2021-03-31 NOTE — CARE PLAN
Problem: Communication  Goal: The ability to communicate needs accurately and effectively will improve  Outcome: PROGRESSING AS EXPECTED     Problem: Safety  Goal: Will remain free from injury  Outcome: PROGRESSING AS EXPECTED  Goal: Will remain free from falls  Outcome: PROGRESSING AS EXPECTED     Problem: Infection  Goal: Will remain free from infection  Outcome: PROGRESSING AS EXPECTED     Problem: Venous Thromboembolism (VTW)/Deep Vein Thrombosis (DVT) Prevention:  Goal: Patient will participate in Venous Thrombosis (VTE)/Deep Vein Thrombosis (DVT)Prevention Measures  Outcome: PROGRESSING AS EXPECTED     Problem: Bowel/Gastric:  Goal: Normal bowel function is maintained or improved  Outcome: PROGRESSING AS EXPECTED  Goal: Will not experience complications related to bowel motility  Outcome: PROGRESSING AS EXPECTED     Problem: Knowledge Deficit  Goal: Knowledge of disease process/condition, treatment plan, diagnostic tests, and medications will improve  Outcome: PROGRESSING AS EXPECTED  Goal: Knowledge of the prescribed therapeutic regimen will improve  Outcome: PROGRESSING AS EXPECTED     Problem: Discharge Barriers/Planning  Goal: Patient's continuum of care needs will be met  Outcome: PROGRESSING AS EXPECTED     Problem: Fluid Volume:  Goal: Will maintain balanced intake and output  Outcome: PROGRESSING AS EXPECTED     Problem: Psychosocial Needs:  Goal: Level of anxiety will decrease  Outcome: PROGRESSING AS EXPECTED     Problem: Mobility  Goal: Risk for activity intolerance will decrease  Outcome: PROGRESSING AS EXPECTED     Problem: Pain Management  Goal: Pain level will decrease to patient's comfort goal  Outcome: PROGRESSING AS EXPECTED     Problem: Skin Integrity  Goal: Risk for impaired skin integrity will decrease  Outcome: PROGRESSING AS EXPECTED     Problem: Potential for Self Harm  Goal: Achieve stable functioning  Outcome: PROGRESSING AS EXPECTED  Goal: Abstinence  Outcome: PROGRESSING AS  EXPECTED  Goal: No active self-harm  Outcome: PROGRESSING AS EXPECTED  Goal: Symptom reduction and improved functioning  Outcome: PROGRESSING AS EXPECTED  Goal: Effective symptom management  Outcome: PROGRESSING AS EXPECTED  Goal: Resolve acute exacerbation and return to psychiatric baseline  Outcome: PROGRESSING AS EXPECTED     Problem: Ineffective Coping  Goal: Achieve stable functioning  Outcome: PROGRESSING AS EXPECTED  Goal: Abstinence  Outcome: PROGRESSING AS EXPECTED  Goal: No active self-harm  Outcome: PROGRESSING AS EXPECTED  Goal: Symptom reduction and improved functioning  Outcome: PROGRESSING AS EXPECTED  Goal: Effective symptom management  Outcome: PROGRESSING AS EXPECTED  Goal: Resolve acute exacerbation and return to psychiatric baseline  Outcome: PROGRESSING AS EXPECTED     Problem: Respiratory:  Goal: Respiratory status will improve  Outcome: PROGRESSING AS EXPECTED     Problem: Urinary Elimination:  Goal: Ability to reestablish a normal urinary elimination pattern will improve  Outcome: PROGRESSING AS EXPECTED

## 2021-04-01 LAB — GLUCOSE BLD-MCNC: 121 MG/DL (ref 65–99)

## 2021-04-01 PROCEDURE — A9270 NON-COVERED ITEM OR SERVICE: HCPCS | Performed by: FAMILY MEDICINE

## 2021-04-01 PROCEDURE — 700102 HCHG RX REV CODE 250 W/ 637 OVERRIDE(OP): Performed by: STUDENT IN AN ORGANIZED HEALTH CARE EDUCATION/TRAINING PROGRAM

## 2021-04-01 PROCEDURE — 700102 HCHG RX REV CODE 250 W/ 637 OVERRIDE(OP): Performed by: HOSPITALIST

## 2021-04-01 PROCEDURE — 700102 HCHG RX REV CODE 250 W/ 637 OVERRIDE(OP): Performed by: FAMILY MEDICINE

## 2021-04-01 PROCEDURE — A9270 NON-COVERED ITEM OR SERVICE: HCPCS | Performed by: STUDENT IN AN ORGANIZED HEALTH CARE EDUCATION/TRAINING PROGRAM

## 2021-04-01 PROCEDURE — 97535 SELF CARE MNGMENT TRAINING: CPT

## 2021-04-01 PROCEDURE — 770001 HCHG ROOM/CARE - MED/SURG/GYN PRIV*

## 2021-04-01 PROCEDURE — 82962 GLUCOSE BLOOD TEST: CPT | Mod: 91

## 2021-04-01 PROCEDURE — A9270 NON-COVERED ITEM OR SERVICE: HCPCS | Performed by: HOSPITALIST

## 2021-04-01 PROCEDURE — 99232 SBSQ HOSP IP/OBS MODERATE 35: CPT | Performed by: HOSPITALIST

## 2021-04-01 RX ADMIN — APIXABAN 5 MG: 5 TABLET, FILM COATED ORAL at 20:32

## 2021-04-01 RX ADMIN — DOCUSATE SODIUM 50 MG AND SENNOSIDES 8.6 MG 2 TABLET: 8.6; 5 TABLET, FILM COATED ORAL at 20:32

## 2021-04-01 RX ADMIN — CLONAZEPAM 1 MG: 1 TABLET ORAL at 17:13

## 2021-04-01 RX ADMIN — ZIPRASIDONE HYDROCHLORIDE 60 MG: 60 CAPSULE ORAL at 20:32

## 2021-04-01 RX ADMIN — ZIPRASIDONE HYDROCHLORIDE 60 MG: 60 CAPSULE ORAL at 10:20

## 2021-04-01 RX ADMIN — METOPROLOL SUCCINATE 25 MG: 25 TABLET, EXTENDED RELEASE ORAL at 05:39

## 2021-04-01 RX ADMIN — PAROXETINE HYDROCHLORIDE 20 MG: 20 TABLET, FILM COATED ORAL at 07:53

## 2021-04-01 RX ADMIN — FLUDROCORTISONE ACETATE 0.1 MG: 0.1 TABLET ORAL at 05:39

## 2021-04-01 RX ADMIN — MIDODRINE HYDROCHLORIDE 5 MG: 5 TABLET ORAL at 11:32

## 2021-04-01 RX ADMIN — DOCUSATE SODIUM 50 MG AND SENNOSIDES 8.6 MG 2 TABLET: 8.6; 5 TABLET, FILM COATED ORAL at 07:52

## 2021-04-01 RX ADMIN — MIDODRINE HYDROCHLORIDE 5 MG: 5 TABLET ORAL at 17:14

## 2021-04-01 RX ADMIN — CLONAZEPAM 0.5 MG: 0.5 TABLET ORAL at 07:52

## 2021-04-01 RX ADMIN — OMEPRAZOLE 20 MG: 20 CAPSULE, DELAYED RELEASE ORAL at 05:39

## 2021-04-01 RX ADMIN — TAMSULOSIN HYDROCHLORIDE 0.4 MG: 0.4 CAPSULE ORAL at 05:39

## 2021-04-01 RX ADMIN — DIGOXIN 125 MCG: 125 TABLET ORAL at 17:13

## 2021-04-01 RX ADMIN — SIMETHICONE 80 MG: 80 TABLET, CHEWABLE ORAL at 11:32

## 2021-04-01 RX ADMIN — SIMETHICONE 80 MG: 80 TABLET, CHEWABLE ORAL at 05:43

## 2021-04-01 RX ADMIN — LEVOTHYROXINE SODIUM 112 MCG: 0.11 TABLET ORAL at 05:38

## 2021-04-01 RX ADMIN — SIMETHICONE 80 MG: 80 TABLET, CHEWABLE ORAL at 17:13

## 2021-04-01 RX ADMIN — MIDODRINE HYDROCHLORIDE 5 MG: 5 TABLET ORAL at 07:53

## 2021-04-01 RX ADMIN — POTASSIUM CHLORIDE 20 MEQ: 20 TABLET, EXTENDED RELEASE ORAL at 05:39

## 2021-04-01 RX ADMIN — INSULIN GLARGINE 5 UNITS: 100 INJECTION, SOLUTION SUBCUTANEOUS at 07:48

## 2021-04-01 RX ADMIN — APIXABAN 5 MG: 5 TABLET, FILM COATED ORAL at 07:53

## 2021-04-01 RX ADMIN — ATORVASTATIN CALCIUM 40 MG: 40 TABLET, FILM COATED ORAL at 17:14

## 2021-04-01 ASSESSMENT — ENCOUNTER SYMPTOMS
HALLUCINATIONS: 0
FOCAL WEAKNESS: 0
VOMITING: 0
NAUSEA: 0
HEADACHES: 0
FEVER: 0
COUGH: 0
DEPRESSION: 1
CHILLS: 0
ABDOMINAL PAIN: 0
MYALGIAS: 0
EYES NEGATIVE: 1

## 2021-04-01 ASSESSMENT — COGNITIVE AND FUNCTIONAL STATUS - GENERAL
SUGGESTED CMS G CODE MODIFIER DAILY ACTIVITY: CH
DAILY ACTIVITIY SCORE: 24

## 2021-04-01 ASSESSMENT — LIFESTYLE VARIABLES: SUBSTANCE_ABUSE: 0

## 2021-04-01 NOTE — DISCHARGE PLANNING
Referrals faxed to [facility] and [facility] @ [phone]

## 2021-04-01 NOTE — PROGRESS NOTES
"Patient sitter at bedside.  Report provided on patient.  Pt states \"I am thinking about cutting my throat with a knife.\"  Denies active plan.  Will continue to monitor.  "

## 2021-04-01 NOTE — CARE PLAN
Problem: Communication  Goal: The ability to communicate needs accurately and effectively will improve  Outcome: PROGRESSING AS EXPECTED     Problem: Safety  Goal: Will remain free from injury  Outcome: PROGRESSING AS EXPECTED     Problem: Potential for Self Harm  Goal: No active self-harm  Outcome: PROGRESSING AS EXPECTED     Problem: Ineffective Coping  Goal: No active self-harm  Outcome: PROGRESSING AS EXPECTED

## 2021-04-01 NOTE — CONSULTS
"Brief Behavioral Health Note    Length of Intervention: 30 minutes    Interview Summary  Patient seen laying on hospital bed, alert and oriented, speech clear and coherent, sitter at bedside. Patient reports, \"I am extremely suicidal now, the most I have been\". Patient could not explain the reasons for the increase in suicidal thoughts. Patient states, \"I am so suicidal I had to ask the nurse to remove my call light and television remote because I am afraid I am going to hang myself\". Patient continues to endorse auditory hallucinations with command voices saying, \"you are worth nothing, you should just kill yourself\". Discussed with patient the idea Dr. Berger discussed with him relating to the possibility  of SNF placement for continued care and physical therapy before I was aware of the 6 month commitment. Patient declined stating, \"I do not feel safe coming off the Legal Hold, its my mind that needs help more than my body\".    Recommendations  1. Legal Hold-court mandated as documented on 4/1 by Bernrado Jorge  2. No privileges at this time  3. Continue one to one level observation  4. Please transfer to an inpatient psychiatric facility once a bed becomes available  Thank you    Susan Ewing, Ph.D., [facility]  "

## 2021-04-01 NOTE — DISCHARGE PLANNING
Agency/Facility Name: [facility]  Spoke To: Winter  Outcome: Patient declined, too medically complex.

## 2021-04-01 NOTE — PROGRESS NOTES
"Hospital Medicine Daily Progress Note    Date of Service  4/1/2021    Chief Complaint  56 y.o. male admitted 2/9/2021 with syncope    Hospital Course  Patient is a 56 year-old man with a past medical history significant for atrial fibrillation (secondary hypercoagulable state), diabetes mellitus, diabetic neuropathy, systolic congestive heart failure,  anddyslipidemia presented to the ER on 2/9/2021 after he had a syncopal episode.    He has a history of orthostatic hypotension, with ongoing orthostatic changes and was started on fludrocortisone.  For the event, patient complained of significant dizziness. In the ER, patient was hypotensive and bradycardic, therefore cardiology was consulted and per their recommendations all AV sanket blocking agents were stopped.    Echocardiogram was obtained which showed EF of 60%, improved from previous echocardiogram obtained on 11/2020.    He also has history of a splenic flexure perforation in September 2020, which resulted in a colostomy and Interiano's pouch in November 2020. He was at home taking care of his colostomy, however he has become depressed, overwhelmed and states he can no longer take care of his colostomy independently and is suicidal.  He was placed on a legal hold and psychiatry is following.    Interval Problem Update  He tells me \"my body feels pretty good today\", denies pain.  \"but I am still suicidal\", report that he would still \"slit my throat if I could\".  Axox3, sitter at bedside.  ROS otherwise negative.  Discussed with nursing and case mgt.    Consultants/Specialty  Psychiatry- SI  Cardiology   General surgery    Code Status  Full Code    Disposition  Awaiting acceptance from psych facility  hospitals could reportedly accept him in May (6 months maturity of ostomy) and have placed him on their wait list    Review of Systems  Review of Systems   Constitutional: Positive for malaise/fatigue. Negative for chills and fever.   HENT: Negative.    Eyes: Negative.  "   Respiratory: Negative for cough.    Cardiovascular: Negative for chest pain.   Gastrointestinal: Negative for abdominal pain, nausea and vomiting.   Genitourinary: Negative for dysuria.   Musculoskeletal: Negative for myalgias.   Skin: Negative for rash.   Neurological: Negative for focal weakness and headaches.   Psychiatric/Behavioral: Positive for depression and suicidal ideas. Negative for hallucinations and substance abuse.        Physical Exam  Temp:  [36.2 °C (97.2 °F)-36.6 °C (97.9 °F)] 36.6 °C (97.8 °F)  Pulse:  [77-87] 86  Resp:  [16-18] 18  BP: (118-133)/(70-80) 132/73  SpO2:  [91 %-93 %] 92 %    Physical Exam  Vitals and nursing note reviewed.   Constitutional:       Appearance: He is not ill-appearing.   HENT:      Head: Normocephalic.      Mouth/Throat:      Mouth: Mucous membranes are moist.      Pharynx: Oropharynx is clear.   Eyes:      Pupils: Pupils are equal, round, and reactive to light.   Cardiovascular:      Rate and Rhythm: Normal rate and regular rhythm.      Heart sounds: Normal heart sounds.   Pulmonary:      Effort: Pulmonary effort is normal. No respiratory distress.      Breath sounds: Normal breath sounds. No wheezing.   Abdominal:      General: Abdomen is flat. Bowel sounds are normal.      Comments: Ostomy cdi   Musculoskeletal:         General: Normal range of motion.      Cervical back: Normal range of motion.   Skin:     General: Skin is warm.   Neurological:      General: No focal deficit present.      Mental Status: He is alert and oriented to person, place, and time.   Psychiatric:         Mood and Affect: Mood is depressed.         Thought Content: Thought content includes suicidal ideation.         Fluids    Intake/Output Summary (Last 24 hours) at 4/1/2021 1123  Last data filed at 4/1/2021 0730  Gross per 24 hour   Intake --   Output 850 ml   Net -850 ml       Laboratory  Recent Labs     03/30/21  0627   WBC 9.7   RBC 5.25   HEMOGLOBIN 14.9   HEMATOCRIT 47.2   MCV 89.9    MCH 28.4   MCHC 31.6*   RDW 48.1   PLATELETCT 232   MPV 10.8     Recent Labs     03/30/21  0627   SODIUM 135   POTASSIUM 4.2   CHLORIDE 97   CO2 28   GLUCOSE 121*   BUN 24*   CREATININE 0.88   CALCIUM 9.6                   Imaging  [id number] VIEW   Final Result         No significant interval change.      DX-CHEST-LIMITED (1 VIEW)   Final Result         Diffuse interstitial prominence could relate to mild pulmonary edema or atypical infection      [id number] VIEW   Final Result      Increased colonic gas without definite bowel obstruction.      US-RENAL   Final Result      No evidence of hydronephrosis.      Lobulated kidneys bilaterally.      CT-ABDOMEN-PELVIS WITH   Final Result      1.  There is no evidence of small bowel obstruction.   2.  There is a left lower quadrant ostomy.   3.  There is fluid distention of the colon distal to the surgical material in the left mid descending colon extending all the way to the rectum. There is no pneumatosis or free air.   4.  There is cholelithiasis without biliary dilatation.   5.  There has been interval removal of the drainage catheter anterior to the spleen with minimal hypodense area in the anterior spleen again noted. There is no new fluid collection.      IR-US GUIDED PIV   Final Result    Ultrasound-guided PERIPHERAL IV INSERTION performed by    qualified nursing staff as above.      EC-ECHOCARDIOGRAM COMPLETE W/O CONT   Final Result      DX-LUMBAR SPINE-2 OR 3 VIEWS   Final Result      Moderate compression deformity of T11 is new compared to 2018.      Mild wedge deformity of T12 is unchanged.      Degenerative changes including facet arthropathy.      Mild retrolisthesis of L5 on S1 and L3 on L4.              Assessment/Plan  * Suicidal ideation- (present on admission)  Assessment & Plan  Has history of MDD, recurrent with psychotic features with active SI w/ plan.  Psychiatry consulted, continued legal hold  To be discharged to psychiatric program pending  medical clearance and when able to self-care for ostomy [facility] reports they would take him in May    Legal hold extended 4/1    Cardiogenic pulmonary edema (HCC)  Assessment & Plan  CXR demonstrates pulmonary vascular congestion / edema  NT-BNP 7828  Now clinically resolved  Stable on RA  following    Major depressive disorder, recurrent, severe with psychotic features (HCC)- (present on admission)  Assessment & Plan  Psychiatry and Psychology following  Continue paroxetine, ziprasidone, and clonazepam    Secondary adrenal insufficiency (HCC)- (present on admission)  Assessment & Plan  Cosyntropin stimulation test reviewed with only a increase in his cortisol going from 14.5 then up to 21.3 after cosyntropin given  Renin 0.1, ACTH 3.4 and slight increase with cosyntropin suggesting secondary AI  Random cortisol 9.9  Needs outpatient endocrinology follow up.  Repeat adrenal function after discharge  Repeat AM BMP    Florinef restarted 3/14 at 0.2 mg daily (2x dose) due to severe sepsis from GNR bacteremia in order to prevent adrenal crisis    Sepsis with acute renal failure without septic shock (HCC)  Assessment & Plan  This is Severe Sepsis Not present on admission  SIRS criteria identified on my evaluation include: Tachycardia, with heart rate greater than 90 BPM and Leukocytosis, with WBC greater than 12,000  Source of infection is GNR bacteremia  Clinical indicators of end organ dysfunction include Systolic blood pressure (SBP) <90 mmHg or mean arterial pressure <65 mmHg and Lactate >2 mmol/L (18.0 mg/dL)  Sepsis protocol initiated  Fluid resuscitation not ordered due to pulmonary edema seen on CXR due to HFrEF  IV antibiotics as appropriate for source of sepsis - Zosyn    BCx x2 (3/13) +GNR in one bottle, awaiting speciation and susceptbilities  UCx added to UA (3/13) that showed pyuria and bacteruria  Cdiff negative (3/12)  Prior microbiology date reviewed, h/o enterococcus and MRSA but currently not positive  for GPCs    resolved    Orthostatic hypotension- (present on admission)  Assessment & Plan  Multifactorial including adrenal insufficiency, medications (beta blocker, CCB, tamsulosin), diabetic autonomic dysfunction, and venous insufficiency  EF is noted to be 60%, significant improvement from prior.  Per cardiology, no further cardiac work-up needed  Follow-up with cardiology as an outpatient.  Continue midodrine, PRN IVF    Bacteremia  Assessment & Plan  Due to klebsiella bacteremia  BCx: 1 of 2 bottles +Klebsiella  Cdiff negative  UA: Bacteruria and pyuria  Bactrim to complete a 10-day course    Abdominal bloating  Assessment & Plan  resolved  CT 3/4 did not demonstrate obstruction nor pneumotosis  Simethicone scheduled 80 mg TID  Continue supportive care    Normal anion gap metabolic acidosis  Assessment & Plan  Resolved  Due to bicarbonate loss via ostomy      Syncope due to orthostatic hypotension- (present on admission)  Assessment & Plan  Resolved  Likely secondary to orthostatic hypotension  Echo normal with 60% ejection fraction, improved from prior    Paroxysmal atrial fibrillation (HCC)- (present on admission)  Assessment & Plan  CHADSVASC 3 (HTN, CHF, DM).   TTE Feb 2021: EF 60%  Continue metoprolol, digoxin, and eliquis  Discontinued telemetry    Chronic heart failure with preserved ejection fraction (HCC)- (present on admission)  Assessment & Plan  Decompensated based on peripheral and pulmonary edema, mild hyponatremia  IV diuresis initiated 3/13 for pulmonary edema, held 3/14 in setting of lactic acidosis from severe sepsis  Recovered EF 60% on repeat echo.  Continue metoprolol XL  Lisinopril held per Cardiology recommendations due to hypotension    Diabetes mellitus type 2, insulin dependent (HCC)- (present on admission)  Assessment & Plan  A1c 5.2  Diabetic diet  Continue SSI  BG goal <200 with bacteremia  Continue atorvastatin    Colostomy present (HCC)- (present on admission)  Assessment &  Plan  History of fall at home Nov 2020 on left side with perforated colon and splenic fluid collection/hematoma s/p segmental colectomy, colostomy, mobilization of the splenic flexure, wound vac, and I&D Nov 2020.   Ostomy care consulted, but he was non-participatory  He is engaging in ostomy care with his bedside RNs, encouraged to be observed but do it independently  Patient must be independently able to care for his ostomy before inpatient psychiatry will accept him    Per surgery Dr. Desouza, high risk of ostomy reversal given his complexities. Plan for close multidisciplinary outpatient follow-up for surgery when  patient overall medical conditions more optimized.      Anxiety and depression- (present on admission)  Assessment & Plan  Legal hold extended through 4/1- court date today  Psychiatry following  Continue paroxetine, ziprasidone, and clonazepam        Hypothyroidism- (present on admission)  Assessment & Plan  TSH wnl Feb 2021  Continue home Levothyroxine    Acute renal failure superimposed on stage 3 chronic kidney disease (HCC)- (present on admission)  Assessment & Plan  CKD 3  At baseline  Following intermittently    Debility- (present on admission)  Assessment & Plan  Likely due to depression  PT/OT evaluated, no further skilled needs    Essential hypertension- (present on admission)  Assessment & Plan  Resolved, has been hypotensive this admission in setting of secondary adrenal insufficiency  Continue midodrine    Lower limb amputation, great toe (HCC)- (present on admission)  Assessment & Plan  s/p right great toe amputation 2018    Hypomagnesemia  Assessment & Plan  Resolved  replaced    Hypokalemia  Assessment & Plan  resolved    Hyponatremia  Assessment & Plan  resolved    Obstructive uropathy- (present on admission)  Assessment & Plan  Urinary retention on bladder scans by straight cath.  Zabala catheter was placed temporarily.  Remove with voiding trials per nursing protocol.  Started on  Flomax.  Voiding without frye    Rash- (present on admission)  Assessment & Plan  Patient developed pruritic drug related rash   resolved    Chronic venous insufficiency of lower extremity- (present on admission)  Assessment & Plan  Continue compression stockings    Elevated troponin- (present on admission)  Assessment & Plan  Resolved  Suspect due to demand ischemia. No need to trend further.    Nonischemic cardiomyopathy (HCC)- (present on admission)  Assessment & Plan  Normal coronaries, 2018.  Tachycardia mediated per cardiology  LVEF improved, based on echo 2/10/2021 LVEF 60%    Chest pain- (present on admission)  Assessment & Plan  Resolved  EKG and Troponins not consistent with ACS    Mixed hyperlipidemia- (present on admission)  Assessment & Plan  Continue atorvastatin    Obesity- (present on admission)  Assessment & Plan  Body mass index is 37.79 kg/m².  Counseled about diet and exercise       VTE prophylaxis: apixaban

## 2021-04-01 NOTE — WOUND TEAM
[facility]  Inpatient Services  Wound and Skin Care Progress Note    Admission Date: 2/9/2021     Last order of IP CONSULT TO WOUND CARE was found on 3/18/2021 from Hospital Encounter on 2/9/2021     HPI, PMH, SH: Reviewed    Past Surgical History:   Procedure Laterality Date   • COLECTOMY N/A 11/10/2020    Procedure: COLECTOMY-SEGMENTAL, COLOSTOMY, MOBILIZATION OF SPLENIC FLEXURE, WOUND VAC PLACEMENT, IRRIGATION AND DEBRIDMENT FLANK WOUND;  Surgeon: Kin Desouza D.O.;  Location: [facility];  Service: General   • ZZZ CARDIAC CATH  07/21/2018    EF 45%, normal coronaries.   • IRRIGATION & DEBRIDEMENT ORTHO Right 2/19/2018    Procedure: IRRIGATION & DEBRIDEMENT ORTHO-FOOT;  Surgeon: Kirby Lopez M.D.;  Location: [facility];  Service: Orthopedics   • TOE AMPUTATION Right 1/17/2018    Procedure: TOE AMPUTATION-1ST RAY;  Surgeon: Doug Delong M.D.;  Location: [facility];  Service: Orthopedics   • TOE AMPUTATION Right 11/10/2017    Procedure: TOE AMPUTATION;  Surgeon: Osorio Leo M.D.;  Location: [facility];  Service: Orthopedics     Social History     Tobacco Use   • Smoking status: Never Smoker   • Smokeless tobacco: Never Used   Substance Use Topics   • Alcohol use: No     Chief Complaint   Patient presents with   • Syncope     X1 this morning when standing up to go to bathroom     Diagnosis: Syncope and collapse [R55]  Suicidal ideation [R45.851]  Sepsis due to Klebsiella pneumoniae (HCC) [A41.4]    Unit where seen by Wound Team: S148/00     WOUND CONSULT/FOLLOW UP RELATED TO:  Sacrum & R posterior thigh follow up    WOUND HISTORY:  Pt is an older gentleman well known to [facility] wound team 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 Pt currently admitted since 2/9/21 related to SI concerns due to body image issues related to colostomy. Wound team was consulted regarding sacrum and Right posterior thigh wounds.     WOUND  ASSESSMENT/LDA      Wound 11/10/20 Full Thickness Wound Abdomen Midline resolving open surgical  (Active)   Wound Image   03/31/21 1600   Site Assessment Pink;Red    Periwound Assessment Denuded    Margins Unattached edges    Closure Secondary intention    Drainage Amount Small    Drainage Description Serosanguineous    Treatments Cleansed;Site care    Wound Cleansing Approved Wound Cleanser    Periwound Protectant Skin Protectant Wipes to Periwound    Dressing Cleansing/Solutions Not Applicable    Dressing Options Collagen Dressing;Hydrofera Blue Ready;Mepilex Heel    Dressing Changed Changed    Dressing Status Clean;Dry;Intact    Dressing Change/Treatment Frequency Every 72 hrs, and As Needed    NEXT Dressing Change/Treatment Date 04/03/21    NEXT Weekly Photo (Inpatient Only) 04/07/21    Number of Staples Removed 10    Non-staged Wound Description Full thickness    Wound Length (cm) 3.5 cm    Wound Width (cm) 4 cm    Wound Depth (cm) 0.1 cm    Wound Surface Area (cm^2) 14 cm^2    Wound Volume (cm^3) 1.4 cm^3    Wound Healing % 100    Wound Bed Granulation (%) 80 %    Tunneling (cm) 0 cm    Tunneling Clock Position of Wound 12    Undermining (cm) 0 cm    Shape Irregular linear    Wound Odor None    Pulses N/A    Exposed Structures None    WOUND NURSE ONLY - Time Spent with Patient (mins) 60        Wound 03/17/21 Pressure Injury Thigh Posterior Right 3/18-DTI 03/31 stage 2 (Active)   Wound Image    03/31/21 1600   Site Assessment Red;Pink;Yellow    Periwound Assessment Intact    Margins Attached edges    Closure None    Drainage Amount Scant    Drainage Description Serosanguineous    Treatments Cleansed;Offloading    Wound Cleansing Approved Wound Cleanser    Periwound Protectant Skin Protectant Wipes to Periwound    Dressing Cleansing/Solutions Not Applicable    Dressing Options Mepitel One;Mepilex    Dressing Changed Changed    Dressing Status Clean;Dry;Intact    Dressing Change/Treatment Frequency Every 48 hrs,  and As Needed    NEXT Dressing Change/Treatment Date 04/02/21    NEXT Weekly Photo (Inpatient Only) 04/07/21    Pressure Injury Stage 2    Wound Length (cm) 2 cm    Wound Width (cm) 0.5 cm    Wound Depth (cm) 0.1 cm    Wound Surface Area (cm^2) 1 cm^2    Wound Volume (cm^3) 0.1 cm^3    Tunneling (cm) 0 cm    Shape Linear x3    Wound Odor None    Exposed Structures None    WOUND NURSE ONLY - Time Spent with Patient (mins) 60      Vascular:    SHAYAN:   No results found.    Lab Values:    Lab Results   Component Value Date/Time    WBC 9.7 03/30/2021 06:27 AM    RBC 5.25 03/30/2021 06:27 AM    HEMOGLOBIN 14.9 03/30/2021 06:27 AM    HEMATOCRIT 47.2 03/30/2021 06:27 AM    CREACTPROT 7.52 (H) 11/29/2020 01:25 AM    SEDRATEWES 13 10/23/2018 08:02 AM    HBA1C 5.2 02/14/2021 01:30 AM        Culture Results show:  No results found for this or any previous visit (from the past 720 hour(s)).    Pain Level/Medicated:  Denies       INTERVENTIONS BY WOUND TEAM:  Chart and images reviewed. Discussed with bedside RN. All areas of concern (based on picture review, LDA review and discussion with bedside RN) have been thoroughly assessed. Documentation of areas based on significant findings. This RN in to assess patient. Performed standard wound care which includes appropriate positioning, dressing removal and non-selective debridement. Pictures and measurements obtained weekly if/when required.       RIGHT POSTERIOR THIGH: Evolved to stage 2  Preparation for Dressing removal: NA  Cleansed with:  4 in 1 foam cleanser  Sharp debridement: NA  Joya wound: no sting skin prep  Primary Dressing: Mepitel one was applied over wound and was then secured in place with a sacral mepilex.           ABD:  Preparation for Dressing removal: NA  Cleansed with:  4 in 1 foam cleanser  Sharp debridement: NA  Joya wound: no sting skin prep  Primary Dressing: ignacio (collagen), hydrofera blue, half heel mepilex  Interdisciplinary consultation: Patient, Bedside  RN,     EVALUATION / RATIONALE FOR TREATMENT:  Most Recent Date:  03/31/21: thigh wound healing well now to a stage 2.  Continue current plan.  All interventions in place.   ABD more denuded and bloody.  Arti to assist in moving wound through inflammatory phase.     3/23/21: Right posterior thigh wound remains a DTI but appears to be evolving into a stage 2. Wound team was previously consulted regarding sacral wound which appeared to be a skin tear however skin now appears to be intact however discolored. Does not appear to be pressure related as pt is on a waffle, self mobile and sacral mepilex is in use. Area is also not a normal pressure injury shape. Wound team will continue to follow.   3/18: Pt with linear DTIs to R posterior thigh from lying on top of frye cath tubing. Offloading precautions ordered. Wound team to continue following.     Goals: Steady decrease in wound area and depth weekly.    WOUND TEAM PLAN OF CARE ([X] for frequency of wound follow up,):   Nursing to follow orders written for wound care. Contact wound team if area fails to progress, deteriorates or with any questions/concerns  Dressing changes by wound team:                   Follow up 3 times weekly:                NPWT change 3 times weekly:     Follow up 1-2 times weekly: X nursing to perform skin care; WT following      Follow up Bi-Monthly:                   Follow up as needed:     Other (explain):     NURSING PLAN OF CARE ORDERS (X):  Dressing changes: See Dressing Care orders:   Skin care: See Skin Care orders: x  RN Prevention Protocol: x  Rectal tube care: See Rectal Tube Care orders:   Other orders:    RSKIN:   CURRENTLY IN PLACE (X), APPLIED THIS VISIT (A), ORDERED (O):   Q shift Michael:  X  Q shift pressure point assessments:  X    Surface/Positioning   Pressure redistribution mattress            Low Airloss        X icu bed  Bariatric foam      Bariatric DARYL     Waffle cushion        Waffle Overlay        X  Reposition q  "2 hours    O  TAPs Turning system   O  Z Teja Pillow     Offloading/Redistribution   Sacral Mepilex (Silicone dressing)   X  Heel Mepilex (Silicone dressing)         Heel float boots (Prevalon boot)             Float Heels off Bed with Pillows           Respiratory NA  Silicone O2 tubing         Gray Foam Ear protectors     Cannula fixation Device (Tender )          High flow offloading Clip    Elastic head band offloading device      Anchorfast                                                         Trach with Optifoam split foam             Containment/Moisture Prevention     Rectal tube or BMS    Purwick/Condom Cath        Zabala Catheter    Barrier wipes   X      Barrier paste       Antifungal tx      Interdry        Mobilization       Up to chair        Ambulate   X  PT/OT      Nutrition       Dietician        Diabetes Education      PO  X   TF     TPN     NPO   # days     Other        Anticipated discharge plans: TBD- pending psych transfer, may need continued offloading  LTACH:        SNF/Rehab:                  Home Health Care:           Outpatient Wound Center:            Self/Family Care:        Other:                  [facility]   Inpatient Services   Established Ostomy Management/ troubleshooting  HPI: Reviewed  PMH: Reviewed   SH: Reviewed   Reason for Ostomy nurse consult:  Established colostomy     Subjective: \"Yes, I am ready to try, I have been burping it.\"       Objective: appliance intact, sitter at bedside, pt on legal hold.       Colostomy 11/10/20 Transverse LUQ (Active)   Wound Image   03/31/21 1600   Stomal Appliance Assessment Intact    Stoma Assessment Clean;Dry;Intact    Stoma Shape Budded Less Than One Inch;Oval    Stoma Size (in) 1.25    Peristomal Assessment Intact    Mucocutaneous Junction Intact    Treatment Appliance Changed;Cleansed with water/washcloth    Peristomal Protectant Paste Ring    Stomal Appliance Paste Ring, 2\";2 1/4\" (57mm) CTF    Output (mL) 150 mL  " "  Output Color Brown    WOUND RN ONLY - Stomal Appliance  2 Piece;Paste Ring, 2\";2 1/4\" (57mm) CTF    Appliance (Pouch) # [id number]    Appliance Brand Daisy    Appliance Supplier [employer]    Secure Start completed Yes    Ostomy Care Resources Provided [facility] Tip Sheet    WOUND NURSE ONLY - Time Spent with Patient (mins) 30         Ostomy Appliance (type and size): 2 1/4\" barrier with pouch and Paste Ring,      Interventions and Education: patient assisted to remove appliance, helped him to cleanse skin with warm wash cloth.  Checked template,  With help patient traced barrier, he started to cut but became frustrated and asked me to finish.  Patient applied paste ring to barrier.  Assisted him to apply to skin.  Started pouch and patient finished and closed.      Evaluation:   03/31/21: patient capable of changing ostomy but still having emotional difficulty with it.  Wound team will continue to follow 1-2 times weekly.  Patient not appropriate for surgical reversal at this time.         3/15/21:  Pt being transferred to T8 related to bacteremia and allergic reaction to zosyn overnight. Appliance working appropriately. Skin intact. Will plan to sign off and be available PRN.     3/11/21: Patient formed own formaflex barrier pouch and paste ring, assisted in cleansing. Feeling better and willing to continue to get more hands on. Likes the formaflex because he is actually able to form it to his size. Still has not performed independently.      3/8/21: Minimal participation, he attempts to do the steps with his eyes closed and minimal effort with his hands. Not sure if he lacks the fine motor movement or just going through the motions due to his expressed hatred of the colostomy. Patient has yet to perform all steps independently.     3/5/21: Pt did not participate in education today. Pt answered questions appropriately, but remained with his eyes closed through the duration of ostomy care. Declined hands on education " when offered. Reported he was not feeling good. Formaflex barrier brought in today for pt as these do no require cutting with scissors. Extra barrier left in bag. CT of abd obtained yesterday did not reveal obstruction.     3/1/21: Viable stoma, producing stool. Per CNA report at bedside, appliance was leaking earlier and CNA changed appliance. Patient observed all steps today and participated with hands on care and did well. Discussed with MD on possibility for reversal. Per surgery, reversal of ostomy is an outpatient procedure that he will have to follow-up with in the future after discharge     2/26/21: Pt very down today, reports that psych has not seen him. Pt kept turning head away from ostomy today but did verbalized he will try to do hands on Sunday.      2/24/21: Pt unable to transfer to inpatient psych until he can care for colostomy independently. Ostomy RNs were asked to return for education. Pt extremely depressed today. Pt states he feels like an animal and that the colostomy is messy and smelly and he would rather die then care for it. This RN provided understanding and attempted to explain that colostomy is manageable. 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 Pt had colostomy created on November 10, 2020 by Dr. Desouza. May want to consider contacting surgery for possible reversal related to pts extreme body dysmorphia (this is pts 2nd admit for SI related to colostomy) and inability for pt to DC due to pts inability/unwillingness to care for colostomy - Discussed with Dr. Ley ([facility] Resident).      Plan: Wound team to continue with teaching every 3-4 days to help teach patient how to care for appliance so that he can discharge to psych.     Anticipated discharge needs: patient would benefit from  SNF, outpatient clinic or Home health for follow up care. Patient needs to follow up with Dr. Desouza after discharge to plan for ostomy reversal surgery.

## 2021-04-01 NOTE — DISCHARGE PLANNING
Received order granting petition for court ordered involuntary admission from the court committing pt to any accepting mental health facility for 6 months(10/1/21). Sent copy to ELISA Sutton, scanned copy into media manager.

## 2021-04-01 NOTE — DISCHARGE PLANNING
Anticipated Discharge Disposition:   Inpatient Psych    Action:   Discussed discharge planning needs during rounds. Received copy of court documents from DPA for 6 month commitment to accepting mental health facility. Pending inpatient psych acceptance. Most [facility] facilities declined. [facility] will put pt on a waitlist, require ostomy to be in place for at least 6 months.     Barriers to Discharge:   Inpatient Psych acceptance  Legal Hold    Plan:   Hospital Care Management will continue to follow and assist with discharge planning needs.

## 2021-04-01 NOTE — THERAPY
"Occupational Therapy  Daily Treatment     Patient Name: Sebastián Castro  Age:  56 y.o., Sex:  male  Medical Record #: [id number]  Today's Date: 4/1/2021     Precautions  Precautions: Fall Risk  Comments: Intermittent orthostatic hypotension    Assessment  Pt was initially motivated to OOB activity, however during ambulation pt had c/o dizziness pt had no LOB was able to turn and sit on his 4ww and transition BTB after returning to his room. Discussed safety w/regards to managing those symptoms and the benefits of his 4ww. Also discussed LB dressing pt reports he hasn't been able to don/doff socks and shoes for 'a while' but modifies w/loose clothing and slip on shoes. Also discussed toilet task pt reports he is still unable to handle managing his ostomy. At this time pt has the functional abilities to complete his ADL's, but on going medical and psychological issues are impeding his ability to manage living independently. OT will no longer actively follow in this setting     Plan  Discharge secondary to goals met. Patient will not be actively followed for occupational therapy services at this time, however may be seen if requested by physician for 1 more visit within 30 days to address any discharge or equipment needs.       DC Equipment Recommendations: None  Discharge Recommendations: Other -(see note ) Likely needs a supportive safe d/c environment to assist w/IADL's given on going psych issues.     Subjective  \"I shake when things don't go the way I wanted them too\"      Objective     04/01/21 1028   Total Time Spent   Total Time Spent (Mins) 25   Treatment Charges   Charges Yes   OT Self Care / ADL 2   Precautions   Precautions Fall Risk   Comments Intermittent orthostatic hypotension   Pain 0 - 10 Group   Therapist Pain Assessment During Activity;Nurse Notified;0   Cognition    Cognition / Consciousness X   Level of Consciousness Alert   Comments more axnxious and flat today from previous session    Passive ROM " "Upper Body   Passive ROM Upper Body WDL   Active ROM Upper Body   Active ROM Upper Body  WDL   Strength Upper Body   Upper Body Strength  WDL   Other Treatments   Other Treatments Provided Focused on safety when feeling dizzy as well as discussing negative thoughts given that pt stated \"I always shake when things don't work out the way I want them to\" as pt had c/o dizziness during today's walk    Balance   Sitting Balance (Static) Good   Sitting Balance (Dynamic) Good   Standing Balance (Static) Fair +   Standing Balance (Dynamic) Fair +   Weight Shift Sitting Good   Weight Shift Standing Fair   Skilled Intervention Verbal Cuing   Comments 4ww no LOB c/o dizziness w/mobility    Bed Mobility    Supine to Sit Supervised   Sit to Supine Supervised   Scooting Supervised   Rolling Supervised   Skilled Intervention Verbal Cuing   Comments bed flat no rails    Activities of Daily Living   Grooming Supervision;Seated   Upper Body Dressing Supervision   Lower Body Dressing   (pt reports he hasn't been able to don/doff socks uses slippe)   Toileting   (refuses to attempt to manage his own ostomy )   Skilled Intervention Verbal Cuing   How much help from another person does the patient currently need...   6 Clicks Daily Activity Score 24   Functional Mobility   Sit to Stand Supervised   Bed, Chair, Wheelchair Transfer Supervised   Mobility walking in room w/4ww   Skilled Intervention Verbal Cuing   Activity Tolerance   Comments c/o dizziness vitals stable 133/69   Patient / Family Goals   Patient / Family Goal #1 walk   Goal #1 Outcome Goal met   Short Term Goals   Short Term Goal # 1 Pt will perform LB dressing with supervision   Goal Outcome # 1 Other (see comments)  (met to pts ability level )   Short Term Goal # 2 Pt will perform functional t/f's with supervision   Goal Outcome # 2 Goal met   Short Term Goal # 3 Pt will perform h/g standing sink side with supervision   Goal Outcome # 3 Goal met   Education Group   Role of " Occupational Therapist Patient Response Patient;Acceptance;Explanation;Verbal Demonstration   Transfers Patient Response Patient;Acceptance;Explanation;Action Demonstration;Verbal Demonstration   ADL Patient Response Patient;Acceptance;Explanation;Verbal Demonstration;Action Demonstration   Anticipated Discharge Equipment and Recommendations   DC Equipment Recommendations None   Discharge Recommendations Other -  (see note )   Interdisciplinary Plan of Care Collaboration   IDT Collaboration with  Nursing   Patient Position at End of Therapy In Bed;Call Light within Reach;Tray Table within Reach;Phone within Reach   Collaboration Comments RN aware of OT session    Session Information   Date / Session Number  4/1 #3 goals met    Priority 0  (d/c needs only )

## 2021-04-02 LAB
GLUCOSE BLD-MCNC: 109 MG/DL (ref 65–99)
GLUCOSE BLD-MCNC: 116 MG/DL (ref 65–99)
GLUCOSE BLD-MCNC: 117 MG/DL (ref 65–99)
GLUCOSE BLD-MCNC: 121 MG/DL (ref 65–99)
GLUCOSE BLD-MCNC: 121 MG/DL (ref 65–99)
GLUCOSE BLD-MCNC: 122 MG/DL (ref 65–99)
GLUCOSE BLD-MCNC: 126 MG/DL (ref 65–99)
GLUCOSE BLD-MCNC: 131 MG/DL (ref 65–99)

## 2021-04-02 PROCEDURE — 700102 HCHG RX REV CODE 250 W/ 637 OVERRIDE(OP): Performed by: FAMILY MEDICINE

## 2021-04-02 PROCEDURE — 700102 HCHG RX REV CODE 250 W/ 637 OVERRIDE(OP): Performed by: STUDENT IN AN ORGANIZED HEALTH CARE EDUCATION/TRAINING PROGRAM

## 2021-04-02 PROCEDURE — 700111 HCHG RX REV CODE 636 W/ 250 OVERRIDE (IP): Performed by: HOSPITALIST

## 2021-04-02 PROCEDURE — A9270 NON-COVERED ITEM OR SERVICE: HCPCS | Performed by: FAMILY MEDICINE

## 2021-04-02 PROCEDURE — A9270 NON-COVERED ITEM OR SERVICE: HCPCS | Performed by: STUDENT IN AN ORGANIZED HEALTH CARE EDUCATION/TRAINING PROGRAM

## 2021-04-02 PROCEDURE — 770001 HCHG ROOM/CARE - MED/SURG/GYN PRIV*

## 2021-04-02 PROCEDURE — 99232 SBSQ HOSP IP/OBS MODERATE 35: CPT | Performed by: HOSPITALIST

## 2021-04-02 PROCEDURE — A9270 NON-COVERED ITEM OR SERVICE: HCPCS | Performed by: HOSPITALIST

## 2021-04-02 PROCEDURE — 700102 HCHG RX REV CODE 250 W/ 637 OVERRIDE(OP): Performed by: HOSPITALIST

## 2021-04-02 PROCEDURE — 82962 GLUCOSE BLOOD TEST: CPT | Mod: 91

## 2021-04-02 RX ADMIN — MIDODRINE HYDROCHLORIDE 5 MG: 5 TABLET ORAL at 16:38

## 2021-04-02 RX ADMIN — APIXABAN 5 MG: 5 TABLET, FILM COATED ORAL at 07:42

## 2021-04-02 RX ADMIN — ATORVASTATIN CALCIUM 40 MG: 40 TABLET, FILM COATED ORAL at 16:38

## 2021-04-02 RX ADMIN — ZIPRASIDONE HYDROCHLORIDE 60 MG: 60 CAPSULE ORAL at 21:00

## 2021-04-02 RX ADMIN — CLONAZEPAM 1 MG: 1 TABLET ORAL at 16:38

## 2021-04-02 RX ADMIN — POTASSIUM CHLORIDE 20 MEQ: 20 TABLET, EXTENDED RELEASE ORAL at 05:35

## 2021-04-02 RX ADMIN — OMEPRAZOLE 20 MG: 20 CAPSULE, DELAYED RELEASE ORAL at 05:35

## 2021-04-02 RX ADMIN — SIMETHICONE 80 MG: 80 TABLET, CHEWABLE ORAL at 16:46

## 2021-04-02 RX ADMIN — SIMETHICONE 80 MG: 80 TABLET, CHEWABLE ORAL at 05:35

## 2021-04-02 RX ADMIN — DOCUSATE SODIUM 50 MG AND SENNOSIDES 8.6 MG 2 TABLET: 8.6; 5 TABLET, FILM COATED ORAL at 20:37

## 2021-04-02 RX ADMIN — DOCUSATE SODIUM 50 MG AND SENNOSIDES 8.6 MG 2 TABLET: 8.6; 5 TABLET, FILM COATED ORAL at 07:42

## 2021-04-02 RX ADMIN — MIDODRINE HYDROCHLORIDE 5 MG: 5 TABLET ORAL at 07:42

## 2021-04-02 RX ADMIN — ONDANSETRON 4 MG: 4 TABLET, ORALLY DISINTEGRATING ORAL at 02:41

## 2021-04-02 RX ADMIN — METOPROLOL SUCCINATE 25 MG: 25 TABLET, EXTENDED RELEASE ORAL at 05:35

## 2021-04-02 RX ADMIN — PAROXETINE HYDROCHLORIDE 20 MG: 20 TABLET, FILM COATED ORAL at 07:42

## 2021-04-02 RX ADMIN — FLUDROCORTISONE ACETATE 0.1 MG: 0.1 TABLET ORAL at 05:34

## 2021-04-02 RX ADMIN — INSULIN GLARGINE 5 UNITS: 100 INJECTION, SOLUTION SUBCUTANEOUS at 07:36

## 2021-04-02 RX ADMIN — APIXABAN 5 MG: 5 TABLET, FILM COATED ORAL at 20:37

## 2021-04-02 RX ADMIN — LEVOTHYROXINE SODIUM 112 MCG: 0.11 TABLET ORAL at 05:34

## 2021-04-02 RX ADMIN — CLONAZEPAM 0.5 MG: 0.5 TABLET ORAL at 07:42

## 2021-04-02 RX ADMIN — MIDODRINE HYDROCHLORIDE 5 MG: 5 TABLET ORAL at 11:42

## 2021-04-02 RX ADMIN — SIMETHICONE 80 MG: 80 TABLET, CHEWABLE ORAL at 11:42

## 2021-04-02 RX ADMIN — ZIPRASIDONE HYDROCHLORIDE 60 MG: 60 CAPSULE ORAL at 07:35

## 2021-04-02 RX ADMIN — TAMSULOSIN HYDROCHLORIDE 0.4 MG: 0.4 CAPSULE ORAL at 05:34

## 2021-04-02 RX ADMIN — DIGOXIN 125 MCG: 125 TABLET ORAL at 16:46

## 2021-04-02 ASSESSMENT — ENCOUNTER SYMPTOMS
FEVER: 0
FOCAL WEAKNESS: 0
MYALGIAS: 0
DEPRESSION: 1
NAUSEA: 0
HALLUCINATIONS: 0
ABDOMINAL PAIN: 0
HEADACHES: 0
COUGH: 0
VOMITING: 0
EYES NEGATIVE: 1
CHILLS: 0

## 2021-04-02 ASSESSMENT — LIFESTYLE VARIABLES: SUBSTANCE_ABUSE: 0

## 2021-04-02 ASSESSMENT — PAIN DESCRIPTION - PAIN TYPE: TYPE: CHRONIC PAIN

## 2021-04-02 NOTE — PROGRESS NOTES
"Hospital Medicine Daily Progress Note    Date of Service  4/2/2021    Chief Complaint  56 y.o. male admitted 2/9/2021 with syncope    Hospital Course  Patient is a 56 year-old man with a past medical history significant for atrial fibrillation (secondary hypercoagulable state), diabetes mellitus, diabetic neuropathy, systolic congestive heart failure,  anddyslipidemia presented to the ER on 2/9/2021 after he had a syncopal episode.    He has a history of orthostatic hypotension, with ongoing orthostatic changes and was started on fludrocortisone.  For the event, patient complained of significant dizziness. In the ER, patient was hypotensive and bradycardic, therefore cardiology was consulted and per their recommendations all AV sanket blocking agents were stopped.    Echocardiogram was obtained which showed EF of 60%, improved from previous echocardiogram obtained on 11/2020.    He also has history of a splenic flexure perforation in September 2020, which resulted in a colostomy and Interiano's pouch in November 2020. He was at home taking care of his colostomy, however he has become depressed, overwhelmed and states he can no longer take care of his colostomy independently and is suicidal.  He was placed on a legal hold and psychiatry is following.    Interval Problem Update  He reported mild dizziness this am when his bp was on the low side, after midodrine given bp increased and sx resolved.  Currently he said he is still feeling \"really suicidal, thinking about strangling myself.\"  He denies h/a, no abdominal or other pain.  He is cooperative, making good eye contact. Sitter at bedside  Discussed with nursing and case mgt.    Consultants/Specialty  Psychiatry- SI  Cardiology   General surgery    Code Status  Full Code    Disposition  Awaiting acceptance from psych facility  [facility] could reportedly accept him in May (6 months maturity of ostomy) and have placed him on their wait list    Review of Systems  Review of " Systems   Constitutional: Positive for malaise/fatigue. Negative for chills and fever.   HENT: Negative.    Eyes: Negative.    Respiratory: Negative for cough.    Cardiovascular: Negative for chest pain.   Gastrointestinal: Negative for abdominal pain, nausea and vomiting.   Genitourinary: Negative for dysuria.   Musculoskeletal: Negative for myalgias.   Skin: Negative for rash.   Neurological: Negative for focal weakness and headaches.   Psychiatric/Behavioral: Positive for depression and suicidal ideas. Negative for hallucinations and substance abuse.        Physical Exam  Temp:  [36.2 °C (97.1 °F)-37.1 °C (98.7 °F)] 36.2 °C (97.1 °F)  Pulse:  [] 99  Resp:  [17-18] 18  BP: ()/(44-84) 101/50  SpO2:  [90 %-93 %] 90 %    Physical Exam  Vitals and nursing note reviewed.   Constitutional:       Appearance: He is not ill-appearing.   HENT:      Head: Normocephalic.      Mouth/Throat:      Mouth: Mucous membranes are moist.      Pharynx: Oropharynx is clear.   Eyes:      Pupils: Pupils are equal, round, and reactive to light.   Cardiovascular:      Rate and Rhythm: Normal rate and regular rhythm.      Heart sounds: Normal heart sounds.   Pulmonary:      Effort: Pulmonary effort is normal. No respiratory distress.      Breath sounds: Normal breath sounds. No wheezing.   Abdominal:      General: Abdomen is flat. Bowel sounds are normal.      Comments: Ostomy cdi   Musculoskeletal:         General: Normal range of motion.      Cervical back: Normal range of motion.   Skin:     General: Skin is warm.   Neurological:      General: No focal deficit present.      Mental Status: He is alert and oriented to person, place, and time.   Psychiatric:         Mood and Affect: Mood is depressed.         Thought Content: Thought content includes suicidal ideation.         Fluids    Intake/Output Summary (Last 24 hours) at 4/2/2021 1248  Last data filed at 4/2/2021 1200  Gross per 24 hour   Intake 660 ml   Output 1901 ml    Net -1241 ml       Laboratory                        Imaging  [id number] VIEW   Final Result         No significant interval change.      DX-CHEST-LIMITED (1 VIEW)   Final Result         Diffuse interstitial prominence could relate to mild pulmonary edema or atypical infection      [id number] VIEW   Final Result      Increased colonic gas without definite bowel obstruction.      US-RENAL   Final Result      No evidence of hydronephrosis.      Lobulated kidneys bilaterally.      CT-ABDOMEN-PELVIS WITH   Final Result      1.  There is no evidence of small bowel obstruction.   2.  There is a left lower quadrant ostomy.   3.  There is fluid distention of the colon distal to the surgical material in the left mid descending colon extending all the way to the rectum. There is no pneumatosis or free air.   4.  There is cholelithiasis without biliary dilatation.   5.  There has been interval removal of the drainage catheter anterior to the spleen with minimal hypodense area in the anterior spleen again noted. There is no new fluid collection.      IR-US GUIDED PIV   Final Result    Ultrasound-guided PERIPHERAL IV INSERTION performed by    qualified nursing staff as above.      EC-ECHOCARDIOGRAM COMPLETE W/O CONT   Final Result      DX-LUMBAR SPINE-2 OR 3 VIEWS   Final Result      Moderate compression deformity of T11 is new compared to 2018.      Mild wedge deformity of T12 is unchanged.      Degenerative changes including facet arthropathy.      Mild retrolisthesis of L5 on S1 and L3 on L4.              Assessment/Plan  * Suicidal ideation- (present on admission)  Assessment & Plan  Has history of MDD, recurrent with psychotic features with active SI w/ plan.  Psychiatry consulted, continued legal hold  To be discharged to psychiatric program pending medical clearance and [facility]    Legal hold extended 4/1    Cardiogenic pulmonary edema  (HCC)  Assessment & Plan  CXR demonstrates pulmonary vascular congestion / edema  NT-BNP 7828  Now clinically resolved  Stable on RA  following    Major depressive disorder, recurrent, severe with psychotic features (HCC)- (present on admission)  Assessment & Plan  Psychiatry and Psychology following  Continue paroxetine, ziprasidone, and clonazepam    Secondary adrenal insufficiency (HCC)- (present on admission)  Assessment & Plan  Cosyntropin stimulation test reviewed with only a increase in his cortisol going from 14.5 then up to 21.3 after cosyntropin given  Renin 0.1, ACTH 3.4 and slight increase with cosyntropin suggesting secondary AI  Random cortisol 9.9  Needs outpatient endocrinology follow up.  Repeat adrenal function after discharge  Repeat AM BMP    Florinef restarted 3/14 at 0.2 mg daily (2x dose) due to severe sepsis from GNR bacteremia in order to prevent adrenal crisis    Sepsis with acute renal failure without septic shock (HCC)  Assessment & Plan  This is Severe Sepsis Not present on admission  SIRS criteria identified on my evaluation include: Tachycardia, with heart rate greater than 90 BPM and Leukocytosis, with WBC greater than 12,000  Source of infection is GNR bacteremia  Clinical indicators of end organ dysfunction include Systolic blood pressure (SBP) <90 mmHg or mean arterial pressure <65 mmHg and Lactate >2 mmol/L (18.0 mg/dL)  Sepsis protocol initiated  Fluid resuscitation not ordered due to pulmonary edema seen on CXR due to HFrEF  IV antibiotics as appropriate for source of sepsis - Zosyn    BCx x2 (3/13) +GNR in one bottle, awaiting speciation and susceptbilities  UCx added to UA (3/13) that showed pyuria and bacteruria  Cdiff negative (3/12)  Prior microbiology date reviewed, h/o enterococcus and MRSA but currently not positive for GPCs    resolved    Orthostatic hypotension- (present on admission)  Assessment & Plan  Multifactorial including adrenal insufficiency, medications (beta  blocker, CCB, tamsulosin), diabetic autonomic dysfunction, and venous insufficiency  EF is noted to be 60%, significant improvement from prior.  Per cardiology, no further cardiac work-up needed  Follow-up with cardiology as an outpatient.  Continue midodrine, PRN IVF    Bacteremia  Assessment & Plan  Due to klebsiella bacteremia  BCx: 1 of 2 bottles +Klebsiella  Cdiff negative  UA: Bacteruria and pyuria  Bactrim to complete a 10-day course    Abdominal bloating  Assessment & Plan  Resolved  CT 3/4 did not demonstrate obstruction nor pneumotosis  Simethicone scheduled 80 mg TID  Continue supportive care    Normal anion gap metabolic acidosis  Assessment & Plan  Resolved  Due to bicarbonate loss via ostomy      Syncope due to orthostatic hypotension- (present on admission)  Assessment & Plan  Resolved  Likely secondary to orthostatic hypotension  Echo normal with 60% ejection fraction, improved from prior    Paroxysmal atrial fibrillation (HCC)- (present on admission)  Assessment & Plan  CHADSVASC 3 (HTN, CHF, DM).   TTE Feb 2021: EF 60%  Continue metoprolol, digoxin, and eliquis  Discontinued telemetry    Chronic heart failure with preserved ejection fraction (HCC)- (present on admission)  Assessment & Plan  Decompensated based on peripheral and pulmonary edema, mild hyponatremia  IV diuresis initiated 3/13 for pulmonary edema, held 3/14 in setting of lactic acidosis from severe sepsis  Recovered EF 60% on repeat echo.  Continue metoprolol XL  Lisinopril held per Cardiology recommendations due to hypotension    Diabetes mellitus type 2, insulin dependent (HCC)- (present on admission)  Assessment & Plan  A1c 5.2  Diabetic diet  Continue SSI  BG goal <200 with bacteremia  Continue atorvastatin    Colostomy present (HCC)- (present on admission)  Assessment & Plan  History of fall at home Nov 2020 on left side with perforated colon and splenic fluid collection/hematoma s/p segmental colectomy, colostomy, mobilization of  the splenic flexure, wound vac, and I&D Nov 2020.   Ostomy care consulted, but he was non-participatory  He is engaging in ostomy care with his bedside RNs, encouraged to be observed but do it independently  Patient must be independently able to care for his ostomy before inpatient psychiatry will accept him    Per surgery Dr. Desouza, high risk of ostomy reversal given his complexities. Plan for close multidisciplinary outpatient follow-up for surgery when  patient overall medical conditions more optimized.      Anxiety and depression- (present on admission)  Assessment & Plan  Legal hold extended, awaiting acceptance at psych facility ([facility] will take in May)  He remains suicidal  Psychiatry following  Continue paroxetine, ziprasidone, and clonazepam        Hypothyroidism- (present on admission)  Assessment & Plan  TSH wnl Feb 2021  Continue home Levothyroxine    Acute renal failure superimposed on stage 3 chronic kidney disease (HCC)- (present on admission)  Assessment & Plan  CKD 3  At baseline  Following intermittently    Debility- (present on admission)  Assessment & Plan  Likely due to depression  PT/OT evaluated, no further skilled needs    Essential hypertension- (present on admission)  Assessment & Plan  Resolved, has been hypotensive this admission in setting of secondary adrenal insufficiency  Continue midodrine    Lower limb amputation, great toe (HCC)- (present on admission)  Assessment & Plan  s/p right great toe amputation 2018    Hypomagnesemia  Assessment & Plan  Resolved  replaced    Hypokalemia  Assessment & Plan  resolved    Hyponatremia  Assessment & Plan  resolved    Obstructive uropathy- (present on admission)  Assessment & Plan  Urinary retention on bladder scans by straight cath.  Frye catheter was placed temporarily.  Remove with voiding trials per nursing protocol.  Started on Flomax.  Voiding without frye    Rash- (present on admission)  Assessment & Plan  Patient developed pruritic  drug related rash   resolved    Chronic venous insufficiency of lower extremity- (present on admission)  Assessment & Plan  Continue compression stockings    Elevated troponin- (present on admission)  Assessment & Plan  Resolved  Suspect due to demand ischemia. No need to trend further.    Nonischemic cardiomyopathy (HCC)- (present on admission)  Assessment & Plan  Normal coronaries, 2018.  Tachycardia mediated per cardiology  LVEF improved, based on echo 2/10/2021 LVEF 60%    Chest pain- (present on admission)  Assessment & Plan  Resolved  EKG and Troponins not consistent with ACS    Mixed hyperlipidemia- (present on admission)  Assessment & Plan  Continue atorvastatin    Obesity- (present on admission)  Assessment & Plan  Body mass index is 37.79 kg/m².  Counseled about diet and exercise       VTE prophylaxis: apixaban

## 2021-04-02 NOTE — CARE PLAN
Problem: Safety  Goal: Will remain free from injury  Outcome: PROGRESSING AS EXPECTED     Problem: Psychosocial Needs:  Goal: Level of anxiety will decrease  Outcome: PROGRESSING AS EXPECTED     Problem: Potential for Self Harm  Goal: Achieve stable functioning  Outcome: PROGRESSING AS EXPECTED  Goal: Abstinence  Outcome: PROGRESSING AS EXPECTED  Goal: No active self-harm  Outcome: PROGRESSING AS EXPECTED     Problem: Ineffective Coping  Goal: Achieve stable functioning  Outcome: PROGRESSING AS EXPECTED  Goal: Abstinence  Outcome: PROGRESSING AS EXPECTED  Goal: No active self-harm  Outcome: PROGRESSING AS EXPECTED

## 2021-04-02 NOTE — PROGRESS NOTES
Patient alert and oriented, BP low at 83/44. Patient complains of feeling dizzy with this. MD aware and will recheck BP in 30min per MD as patient was given Middodrine with morning meds. Patient with legal hold in place and 1:1 sitter for SI. Patient states feeling suicidal and with a plan to hurt oneself at this time. Patient states will turn self in bed and educated on the need to do so. Will monitor.

## 2021-04-02 NOTE — DISCHARGE PLANNING
Anticipated Discharge Disposition:   Inpatient Psych    Action:   Discussed discharge planning needs during rounds. Pt was declined by multiple inpatient psych. Pt remains on Legal Hold. DPA to follow up with [facility].    Barriers to Discharge:   Inpatient Psych acceptance    Plan:   Hospital Care Management will continue to follow and assist with discharge planning needs.

## 2021-04-02 NOTE — PROGRESS NOTES
Transfer of care report received from [facility].Patient in room awake with sitter at bedside.AOx4 on RA.

## 2021-04-03 LAB
GLUCOSE BLD-MCNC: 115 MG/DL (ref 65–99)
GLUCOSE BLD-MCNC: 119 MG/DL (ref 65–99)
GLUCOSE BLD-MCNC: 203 MG/DL (ref 65–99)

## 2021-04-03 PROCEDURE — A9270 NON-COVERED ITEM OR SERVICE: HCPCS | Performed by: STUDENT IN AN ORGANIZED HEALTH CARE EDUCATION/TRAINING PROGRAM

## 2021-04-03 PROCEDURE — 700102 HCHG RX REV CODE 250 W/ 637 OVERRIDE(OP): Performed by: HOSPITALIST

## 2021-04-03 PROCEDURE — 700102 HCHG RX REV CODE 250 W/ 637 OVERRIDE(OP): Performed by: STUDENT IN AN ORGANIZED HEALTH CARE EDUCATION/TRAINING PROGRAM

## 2021-04-03 PROCEDURE — 700102 HCHG RX REV CODE 250 W/ 637 OVERRIDE(OP): Performed by: FAMILY MEDICINE

## 2021-04-03 PROCEDURE — A9270 NON-COVERED ITEM OR SERVICE: HCPCS | Performed by: FAMILY MEDICINE

## 2021-04-03 PROCEDURE — A9270 NON-COVERED ITEM OR SERVICE: HCPCS | Performed by: HOSPITALIST

## 2021-04-03 PROCEDURE — 770001 HCHG ROOM/CARE - MED/SURG/GYN PRIV*

## 2021-04-03 PROCEDURE — 99231 SBSQ HOSP IP/OBS SF/LOW 25: CPT | Performed by: HOSPITALIST

## 2021-04-03 PROCEDURE — 99233 SBSQ HOSP IP/OBS HIGH 50: CPT | Performed by: PSYCHIATRY & NEUROLOGY

## 2021-04-03 PROCEDURE — 82962 GLUCOSE BLOOD TEST: CPT | Mod: 91

## 2021-04-03 RX ADMIN — ATORVASTATIN CALCIUM 40 MG: 40 TABLET, FILM COATED ORAL at 18:04

## 2021-04-03 RX ADMIN — PAROXETINE HYDROCHLORIDE 20 MG: 20 TABLET, FILM COATED ORAL at 08:42

## 2021-04-03 RX ADMIN — MIDODRINE HYDROCHLORIDE 5 MG: 5 TABLET ORAL at 18:03

## 2021-04-03 RX ADMIN — METOPROLOL SUCCINATE 25 MG: 25 TABLET, EXTENDED RELEASE ORAL at 06:00

## 2021-04-03 RX ADMIN — APIXABAN 5 MG: 5 TABLET, FILM COATED ORAL at 20:47

## 2021-04-03 RX ADMIN — SIMETHICONE 80 MG: 80 TABLET, CHEWABLE ORAL at 12:03

## 2021-04-03 RX ADMIN — LEVOTHYROXINE SODIUM 112 MCG: 0.11 TABLET ORAL at 06:00

## 2021-04-03 RX ADMIN — OMEPRAZOLE 20 MG: 20 CAPSULE, DELAYED RELEASE ORAL at 06:00

## 2021-04-03 RX ADMIN — APIXABAN 5 MG: 5 TABLET, FILM COATED ORAL at 08:42

## 2021-04-03 RX ADMIN — TAMSULOSIN HYDROCHLORIDE 0.4 MG: 0.4 CAPSULE ORAL at 06:00

## 2021-04-03 RX ADMIN — CLONAZEPAM 0.5 MG: 0.5 TABLET ORAL at 08:42

## 2021-04-03 RX ADMIN — ZIPRASIDONE HYDROCHLORIDE 60 MG: 60 CAPSULE ORAL at 08:41

## 2021-04-03 RX ADMIN — CLONAZEPAM 1 MG: 1 TABLET ORAL at 18:03

## 2021-04-03 RX ADMIN — ZIPRASIDONE HYDROCHLORIDE 60 MG: 60 CAPSULE ORAL at 20:47

## 2021-04-03 RX ADMIN — MIDODRINE HYDROCHLORIDE 5 MG: 5 TABLET ORAL at 12:03

## 2021-04-03 RX ADMIN — DIGOXIN 125 MCG: 125 TABLET ORAL at 18:03

## 2021-04-03 RX ADMIN — INSULIN HUMAN 2 UNITS: 100 INJECTION, SOLUTION PARENTERAL at 21:54

## 2021-04-03 RX ADMIN — SIMETHICONE 80 MG: 80 TABLET, CHEWABLE ORAL at 06:01

## 2021-04-03 RX ADMIN — INSULIN GLARGINE 5 UNITS: 100 INJECTION, SOLUTION SUBCUTANEOUS at 06:07

## 2021-04-03 RX ADMIN — SIMETHICONE 80 MG: 80 TABLET, CHEWABLE ORAL at 18:03

## 2021-04-03 RX ADMIN — POTASSIUM CHLORIDE 20 MEQ: 20 TABLET, EXTENDED RELEASE ORAL at 06:00

## 2021-04-03 RX ADMIN — MIDODRINE HYDROCHLORIDE 5 MG: 5 TABLET ORAL at 08:42

## 2021-04-03 RX ADMIN — FLUDROCORTISONE ACETATE 0.1 MG: 0.1 TABLET ORAL at 06:01

## 2021-04-03 ASSESSMENT — ENCOUNTER SYMPTOMS
HALLUCINATIONS: 0
CHILLS: 0
MYALGIAS: 0
COUGH: 0
VOMITING: 0
FEVER: 0
EYES NEGATIVE: 1
DEPRESSION: 1
NAUSEA: 0
ABDOMINAL PAIN: 0
FOCAL WEAKNESS: 0
HEADACHES: 0

## 2021-04-03 ASSESSMENT — LIFESTYLE VARIABLES: SUBSTANCE_ABUSE: 0

## 2021-04-03 ASSESSMENT — PAIN DESCRIPTION - PAIN TYPE: TYPE: CHRONIC PAIN

## 2021-04-03 NOTE — CONSULTS
PSYCHIATRIC FOLLOW-UP:(established)  *Reason for admission: Syncope, hypotension. Being followed for SI   *Legal Hold Status: court committment for 6 months                *HPI: continues to be SI, doesn't know why, eats, slept, allows dressing changes so behavior is not one of severe depression with plans to die.            *Psychiatric Examination: no change  Vitals:   Vitals:    04/02/21 1602 04/02/21 2100 04/03/21 0500 04/03/21 0800   BP: 119/65 (!) 95/64 111/64 102/56   Pulse: 83 82 65 63   Resp: 16 18 18 18   Temp: 37.1 °C (98.7 °F) 36.9 °C (98.4 °F) 36.1 °C (96.9 °F) 36.4 °C (97.6 °F)   TempSrc: Temporal Temporal Temporal Temporal   SpO2: 91% 93% 92% 92%   Weight:       Height:              *ASSESSMENT/RECOMENDATIONS:  1. 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 Medical  -ostomy  -HTN  -pafib  -CHF  -DM2  -hypothyroidism  -orthostatic hypotension  -SRUTHI  -secondary adrenal insufficiency  -leukocytosis  -non ischemic cardiomyopathy      Legal hold: court committed. Its highly unlikey that pt will be accepted at a psychiatric inpt facility while he does not change his own ostomy. Could pt be placed in a SNF? If so we could work to remove the court commitment because pt is unlikely to harm himself while in a controlled setting. He is chronically SI.   Observation status: 1:1 sitter  Privileges (while on legal hold): none      *Signing off. Dr Berger will be following.

## 2021-04-03 NOTE — PROGRESS NOTES
"Hospital Medicine Daily Progress Note    Date of Service  4/3/2021    Chief Complaint  56 y.o. male admitted 2/9/2021 with syncope    Hospital Course  Patient is a 56 year-old man with a past medical history significant for atrial fibrillation (secondary hypercoagulable state), diabetes mellitus, diabetic neuropathy, systolic congestive heart failure,  anddyslipidemia presented to the ER on 2/9/2021 after he had a syncopal episode.    He has a history of orthostatic hypotension, with ongoing orthostatic changes and was started on fludrocortisone.  For the event, patient complained of significant dizziness. In the ER, patient was hypotensive and bradycardic, therefore cardiology was consulted and per their recommendations all AV sanket blocking agents were stopped.    Echocardiogram was obtained which showed EF of 60%, improved from previous echocardiogram obtained on 11/2020.    He also has history of a splenic flexure perforation in September 2020, which resulted in a colostomy and Interiano's pouch in November 2020. He was at home taking care of his colostomy, however he has become depressed, overwhelmed and states he can no longer take care of his colostomy independently and is suicidal.  He was placed on a legal hold and psychiatry is following.    Interval Problem Update  No significant change, remains alert and good eye contact, tells me he is still suicidal and would like to take \"pills to make me sleep forever. I'm just tired of being alive\".  Axox3, ros otherwise negative. Sitter at bedside, discussed with nursing and case mgt    Consultants/Specialty  Psychiatry- SI  Cardiology   General surgery    Code Status  Full Code    Disposition  Awaiting acceptance from psych facility  [facility] [place] [facility] him in May (6 months maturity of ostomy) and have placed him on their wait list    Review of Systems  Review of Systems   Constitutional: Positive for malaise/fatigue. Negative for chills and fever.   HENT: " Negative.    Eyes: Negative.    Respiratory: Negative for cough.    Cardiovascular: Negative for chest pain.   Gastrointestinal: Negative for abdominal pain, nausea and vomiting.   Genitourinary: Negative for dysuria.   Musculoskeletal: Negative for myalgias.   Skin: Negative for rash.   Neurological: Negative for focal weakness and headaches.   Psychiatric/Behavioral: Positive for depression and suicidal ideas. Negative for hallucinations and substance abuse.        Physical Exam  Temp:  [36.1 °C (96.9 °F)-37.1 °C (98.7 °F)] 36.1 °C (96.9 °F)  Pulse:  [65-83] 65  Resp:  [16-18] 18  BP: ()/(50-65) 111/64  SpO2:  [91 %-93 %] 92 %    Physical Exam  Vitals and nursing note reviewed.   Constitutional:       Appearance: He is not ill-appearing.   HENT:      Head: Normocephalic.      Mouth/Throat:      Mouth: Mucous membranes are moist.      Pharynx: Oropharynx is clear.   Eyes:      Pupils: Pupils are equal, round, and reactive to light.   Cardiovascular:      Rate and Rhythm: Normal rate and regular rhythm.      Heart sounds: Normal heart sounds.   Pulmonary:      Effort: Pulmonary effort is normal. No respiratory distress.      Breath sounds: Normal breath sounds. No wheezing.   Abdominal:      General: Abdomen is flat. Bowel sounds are normal.      Comments: Ostomy cdi   Musculoskeletal:         General: Normal range of motion.      Cervical back: Normal range of motion.   Skin:     General: Skin is warm.   Neurological:      General: No focal deficit present.      Mental Status: He is alert and oriented to person, place, and time.   Psychiatric:         Mood and Affect: Mood is depressed.         Thought Content: Thought content includes suicidal ideation.         Fluids    Intake/Output Summary (Last 24 hours) at 4/3/2021 075  Last data filed at 4/3/2021 0500  Gross per 24 hour   Intake 720 ml   Output 1350 ml   Net -630 ml       Laboratory                        Imaging  [id number] VIEW   Final Result          No significant interval change.      DX-CHEST-LIMITED (1 VIEW)   Final Result         Diffuse interstitial prominence could relate to mild pulmonary edema or atypical infection      [id number] VIEW   Final Result      Increased colonic gas without definite bowel obstruction.      US-RENAL   Final Result      No evidence of hydronephrosis.      Lobulated kidneys bilaterally.      CT-ABDOMEN-PELVIS WITH   Final Result      1.  There is no evidence of small bowel obstruction.   2.  There is a left lower quadrant ostomy.   3.  There is fluid distention of the colon distal to the surgical material in the left mid descending colon extending all the way to the rectum. There is no pneumatosis or free air.   4.  There is cholelithiasis without biliary dilatation.   5.  There has been interval removal of the drainage catheter anterior to the spleen with minimal hypodense area in the anterior spleen again noted. There is no new fluid collection.      IR-US GUIDED PIV   Final Result    Ultrasound-guided PERIPHERAL IV INSERTION performed by    qualified nursing staff as above.      EC-ECHOCARDIOGRAM COMPLETE W/O CONT   Final Result      DX-LUMBAR SPINE-2 OR 3 VIEWS   Final Result      Moderate compression deformity of T11 is new compared to 2018.      Mild wedge deformity of T12 is unchanged.      Degenerative changes including facet arthropathy.      Mild retrolisthesis of L5 on S1 and L3 on L4.              Assessment/Plan  * Suicidal ideation- (present on admission)  Assessment & Plan  Has history of MDD, recurrent with psychotic features with active SI w/ plan.  Psychiatry consulted, continued legal hold  To be discharged to psychiatric program pending medical clearance and when able to self-care for ostomy [facility] reports they would take him in May    Legal hold extended 4/1    Cardiogenic pulmonary edema (HCC)  Assessment & Plan  CXR demonstrates pulmonary vascular congestion / edema  NT-BNP 7828  Now clinically  resolved  Stable on RA  following    Major depressive disorder, recurrent, severe with psychotic features (HCC)- (present on admission)  Assessment & Plan  Psychiatry and Psychology following  Continue paroxetine, ziprasidone, and clonazepam    Secondary adrenal insufficiency (HCC)- (present on admission)  Assessment & Plan  Cosyntropin stimulation test reviewed with only a increase in his cortisol going from 14.5 then up to 21.3 after cosyntropin given  Renin 0.1, ACTH 3.4 and slight increase with cosyntropin suggesting secondary AI  Random cortisol 9.9  Needs outpatient endocrinology follow up.  Repeat adrenal function after discharge  Repeat AM BMP    Florinef restarted 3/14 at 0.2 mg daily (2x dose) due to severe sepsis from GNR bacteremia in order to prevent adrenal crisis    Sepsis with acute renal failure without septic shock (HCC)  Assessment & Plan  This is Severe Sepsis Not present on admission  SIRS criteria identified on my evaluation include: Tachycardia, with heart rate greater than 90 BPM and Leukocytosis, with WBC greater than 12,000  Source of infection is GNR bacteremia  Clinical indicators of end organ dysfunction include Systolic blood pressure (SBP) <90 mmHg or mean arterial pressure <65 mmHg and Lactate >2 mmol/L (18.0 mg/dL)  Sepsis protocol initiated  Fluid resuscitation not ordered due to pulmonary edema seen on CXR due to HFrEF  IV antibiotics as appropriate for source of sepsis - Zosyn    BCx x2 (3/13) +GNR in one bottle, awaiting speciation and susceptbilities  UCx added to UA (3/13) that showed pyuria and bacteruria  Cdiff negative (3/12)  Prior microbiology date reviewed, h/o enterococcus and MRSA but currently not positive for GPCs    resolved    Orthostatic hypotension- (present on admission)  Assessment & Plan  Multifactorial including adrenal insufficiency, medications (beta blocker, CCB, tamsulosin), diabetic autonomic dysfunction, and venous insufficiency  EF is noted to be 60%,  significant improvement from prior.  Per cardiology, no further cardiac work-up needed  Follow-up with cardiology as an outpatient.  Continue midodrine, PRN IVF    Bacteremia  Assessment & Plan  Due to klebsiella bacteremia  BCx: 1 of 2 bottles +Klebsiella  Cdiff negative  UA: Bacteruria and pyuria  Bactrim to complete a 10-day course    Abdominal bloating  Assessment & Plan  Resolved  CT 3/4 did not demonstrate obstruction nor pneumotosis  Simethicone scheduled 80 mg TID  Continue supportive care    Normal anion gap metabolic acidosis  Assessment & Plan  Resolved  Due to bicarbonate loss via ostomy      Syncope due to orthostatic hypotension- (present on admission)  Assessment & Plan  Resolved  Likely secondary to orthostatic hypotension  Echo normal with 60% ejection fraction, improved from prior    Paroxysmal atrial fibrillation (HCC)- (present on admission)  Assessment & Plan  CHADSVASC 3 (HTN, CHF, DM).   TTE Feb 2021: EF 60%  Continue metoprolol, digoxin, and eliquis  Discontinued telemetry    Chronic heart failure with preserved ejection fraction (HCC)- (present on admission)  Assessment & Plan  Decompensated based on peripheral and pulmonary edema, mild hyponatremia  IV diuresis initiated 3/13 for pulmonary edema, held 3/14 in setting of lactic acidosis from severe sepsis  Recovered EF 60% on repeat echo.  Continue metoprolol XL  Lisinopril held per Cardiology recommendations due to hypotension    Diabetes mellitus type 2, insulin dependent (HCC)- (present on admission)  Assessment & Plan  A1c 5.2  Diabetic diet  Continue SSI  BG goal <200 with bacteremia  Continue atorvastatin    Colostomy present (HCC)- (present on admission)  Assessment & Plan  History of fall at home Nov 2020 on left side with perforated colon and splenic fluid collection/hematoma s/p segmental colectomy, colostomy, mobilization of the splenic flexure, wound vac, and I&D Nov 2020.   Ostomy care consulted, but he was non-participatory  He  is engaging in ostomy care with his bedside RNs, encouraged to be observed but do it independently  Patient must be independently able to care for his ostomy before inpatient psychiatry will accept him    Per surgery Dr. Desouza, high risk of ostomy reversal given his complexities. Plan for close multidisciplinary outpatient follow-up for surgery when  patient overall medical conditions more optimized.      Anxiety and depression- (present on admission)  Assessment & Plan  Legal hold extended, awaiting acceptance at psych facility ([facility] will take in May)  He remains suicidal  Psychiatry following  Continue paroxetine, ziprasidone, and clonazepam        Hypothyroidism- (present on admission)  Assessment & Plan  TSH wnl Feb 2021  Continue home Levothyroxine    Acute renal failure superimposed on stage 3 chronic kidney disease (HCC)- (present on admission)  Assessment & Plan  CKD 3  At baseline  Following intermittently    Debility- (present on admission)  Assessment & Plan  Likely due to depression  PT/OT evaluated, no further skilled needs    Essential hypertension- (present on admission)  Assessment & Plan  Resolved, has been hypotensive this admission in setting of secondary adrenal insufficiency  Continue midodrine    Lower limb amputation, great toe (HCC)- (present on admission)  Assessment & Plan  s/p right great toe amputation 2018    Hypomagnesemia  Assessment & Plan  Resolved  replaced    Hypokalemia  Assessment & Plan  resolved    Hyponatremia  Assessment & Plan  resolved    Obstructive uropathy- (present on admission)  Assessment & Plan  Urinary retention on bladder scans by straight cath.  Frye catheter was placed temporarily.  Remove with voiding trials per nursing protocol.  Started on Flomax.  Voiding without frye    Rash- (present on admission)  Assessment & Plan  Patient developed pruritic drug related rash   resolved    Chronic venous insufficiency of lower extremity- (present on  admission)  Assessment & Plan  Continue compression stockings    Elevated troponin- (present on admission)  Assessment & Plan  Resolved  Suspect due to demand ischemia. No need to trend further.    Nonischemic cardiomyopathy (HCC)- (present on admission)  Assessment & Plan  Normal coronaries, 2018.  Tachycardia mediated per cardiology  LVEF improved, based on echo 2/10/2021 LVEF 60%    Chest pain- (present on admission)  Assessment & Plan  Resolved  EKG and Troponins not consistent with ACS    Mixed hyperlipidemia- (present on admission)  Assessment & Plan  Continue atorvastatin    Obesity- (present on admission)  Assessment & Plan  Body mass index is 37.79 kg/m².  Counseled about diet and exercise       VTE prophylaxis: apixaban

## 2021-04-04 LAB
GLUCOSE BLD-MCNC: 114 MG/DL (ref 65–99)
GLUCOSE BLD-MCNC: 119 MG/DL (ref 65–99)
GLUCOSE BLD-MCNC: 121 MG/DL (ref 65–99)
GLUCOSE BLD-MCNC: 125 MG/DL (ref 65–99)

## 2021-04-04 PROCEDURE — 700102 HCHG RX REV CODE 250 W/ 637 OVERRIDE(OP): Performed by: STUDENT IN AN ORGANIZED HEALTH CARE EDUCATION/TRAINING PROGRAM

## 2021-04-04 PROCEDURE — 770001 HCHG ROOM/CARE - MED/SURG/GYN PRIV*

## 2021-04-04 PROCEDURE — 700102 HCHG RX REV CODE 250 W/ 637 OVERRIDE(OP): Performed by: HOSPITALIST

## 2021-04-04 PROCEDURE — A9270 NON-COVERED ITEM OR SERVICE: HCPCS | Performed by: FAMILY MEDICINE

## 2021-04-04 PROCEDURE — A9270 NON-COVERED ITEM OR SERVICE: HCPCS | Performed by: STUDENT IN AN ORGANIZED HEALTH CARE EDUCATION/TRAINING PROGRAM

## 2021-04-04 PROCEDURE — 82962 GLUCOSE BLOOD TEST: CPT | Mod: 91

## 2021-04-04 PROCEDURE — 700102 HCHG RX REV CODE 250 W/ 637 OVERRIDE(OP): Performed by: FAMILY MEDICINE

## 2021-04-04 PROCEDURE — A9270 NON-COVERED ITEM OR SERVICE: HCPCS | Performed by: HOSPITALIST

## 2021-04-04 RX ADMIN — CLONAZEPAM 0.5 MG: 0.5 TABLET ORAL at 08:27

## 2021-04-04 RX ADMIN — POTASSIUM CHLORIDE 20 MEQ: 20 TABLET, EXTENDED RELEASE ORAL at 05:27

## 2021-04-04 RX ADMIN — ZIPRASIDONE HYDROCHLORIDE 60 MG: 60 CAPSULE ORAL at 20:47

## 2021-04-04 RX ADMIN — MIDODRINE HYDROCHLORIDE 5 MG: 5 TABLET ORAL at 17:14

## 2021-04-04 RX ADMIN — APIXABAN 5 MG: 5 TABLET, FILM COATED ORAL at 08:27

## 2021-04-04 RX ADMIN — MIDODRINE HYDROCHLORIDE 5 MG: 5 TABLET ORAL at 12:10

## 2021-04-04 RX ADMIN — SIMETHICONE 80 MG: 80 TABLET, CHEWABLE ORAL at 12:10

## 2021-04-04 RX ADMIN — APIXABAN 5 MG: 5 TABLET, FILM COATED ORAL at 20:47

## 2021-04-04 RX ADMIN — TAMSULOSIN HYDROCHLORIDE 0.4 MG: 0.4 CAPSULE ORAL at 05:27

## 2021-04-04 RX ADMIN — ATORVASTATIN CALCIUM 40 MG: 40 TABLET, FILM COATED ORAL at 17:14

## 2021-04-04 RX ADMIN — SIMETHICONE 80 MG: 80 TABLET, CHEWABLE ORAL at 05:27

## 2021-04-04 RX ADMIN — DIGOXIN 125 MCG: 125 TABLET ORAL at 17:14

## 2021-04-04 RX ADMIN — INSULIN GLARGINE 5 UNITS: 100 INJECTION, SOLUTION SUBCUTANEOUS at 08:27

## 2021-04-04 RX ADMIN — METOPROLOL SUCCINATE 25 MG: 25 TABLET, EXTENDED RELEASE ORAL at 05:27

## 2021-04-04 RX ADMIN — CLONAZEPAM 1 MG: 1 TABLET ORAL at 17:14

## 2021-04-04 RX ADMIN — OMEPRAZOLE 20 MG: 20 CAPSULE, DELAYED RELEASE ORAL at 05:27

## 2021-04-04 RX ADMIN — MIDODRINE HYDROCHLORIDE 5 MG: 5 TABLET ORAL at 08:27

## 2021-04-04 RX ADMIN — LEVOTHYROXINE SODIUM 112 MCG: 0.11 TABLET ORAL at 05:27

## 2021-04-04 RX ADMIN — ZIPRASIDONE HYDROCHLORIDE 60 MG: 60 CAPSULE ORAL at 08:28

## 2021-04-04 RX ADMIN — FLUDROCORTISONE ACETATE 0.1 MG: 0.1 TABLET ORAL at 05:27

## 2021-04-04 RX ADMIN — PAROXETINE HYDROCHLORIDE 20 MG: 20 TABLET, FILM COATED ORAL at 08:27

## 2021-04-04 ASSESSMENT — PAIN DESCRIPTION - PAIN TYPE
TYPE: CHRONIC PAIN
TYPE: CHRONIC PAIN

## 2021-04-04 ASSESSMENT — FIBROSIS 4 INDEX: FIB4 SCORE: 1.062132289312400106

## 2021-04-04 NOTE — PROGRESS NOTES
Assumed care at 0645. Patient in bed, reports a decrease in SI. Dressings changed to posterior right thigh, sacrum and midline abd,. Patient ambulated hallway. Gait steady. Reported mild dizziness. Will continue to monitor for additional needs.

## 2021-04-05 LAB
GLUCOSE BLD-MCNC: 111 MG/DL (ref 65–99)
GLUCOSE BLD-MCNC: 115 MG/DL (ref 65–99)
GLUCOSE BLD-MCNC: 120 MG/DL (ref 65–99)
GLUCOSE BLD-MCNC: 135 MG/DL (ref 65–99)

## 2021-04-05 PROCEDURE — 770001 HCHG ROOM/CARE - MED/SURG/GYN PRIV*

## 2021-04-05 PROCEDURE — 700102 HCHG RX REV CODE 250 W/ 637 OVERRIDE(OP): Performed by: STUDENT IN AN ORGANIZED HEALTH CARE EDUCATION/TRAINING PROGRAM

## 2021-04-05 PROCEDURE — 700102 HCHG RX REV CODE 250 W/ 637 OVERRIDE(OP): Performed by: FAMILY MEDICINE

## 2021-04-05 PROCEDURE — A9270 NON-COVERED ITEM OR SERVICE: HCPCS | Performed by: STUDENT IN AN ORGANIZED HEALTH CARE EDUCATION/TRAINING PROGRAM

## 2021-04-05 PROCEDURE — 700102 HCHG RX REV CODE 250 W/ 637 OVERRIDE(OP): Performed by: HOSPITALIST

## 2021-04-05 PROCEDURE — 99231 SBSQ HOSP IP/OBS SF/LOW 25: CPT | Performed by: PSYCHIATRY & NEUROLOGY

## 2021-04-05 PROCEDURE — 82962 GLUCOSE BLOOD TEST: CPT | Mod: 91

## 2021-04-05 PROCEDURE — A9270 NON-COVERED ITEM OR SERVICE: HCPCS | Performed by: HOSPITALIST

## 2021-04-05 PROCEDURE — A9270 NON-COVERED ITEM OR SERVICE: HCPCS | Performed by: FAMILY MEDICINE

## 2021-04-05 RX ADMIN — METOPROLOL SUCCINATE 25 MG: 25 TABLET, EXTENDED RELEASE ORAL at 06:17

## 2021-04-05 RX ADMIN — TAMSULOSIN HYDROCHLORIDE 0.4 MG: 0.4 CAPSULE ORAL at 06:17

## 2021-04-05 RX ADMIN — CLONAZEPAM 1 MG: 1 TABLET ORAL at 17:00

## 2021-04-05 RX ADMIN — ZIPRASIDONE HYDROCHLORIDE 60 MG: 60 CAPSULE ORAL at 11:21

## 2021-04-05 RX ADMIN — MIDODRINE HYDROCHLORIDE 5 MG: 5 TABLET ORAL at 11:21

## 2021-04-05 RX ADMIN — FLUDROCORTISONE ACETATE 0.1 MG: 0.1 TABLET ORAL at 06:17

## 2021-04-05 RX ADMIN — ZIPRASIDONE HYDROCHLORIDE 60 MG: 60 CAPSULE ORAL at 20:30

## 2021-04-05 RX ADMIN — INSULIN GLARGINE 5 UNITS: 100 INJECTION, SOLUTION SUBCUTANEOUS at 06:26

## 2021-04-05 RX ADMIN — APIXABAN 5 MG: 5 TABLET, FILM COATED ORAL at 20:31

## 2021-04-05 RX ADMIN — LEVOTHYROXINE SODIUM 112 MCG: 0.11 TABLET ORAL at 06:17

## 2021-04-05 RX ADMIN — SIMETHICONE 80 MG: 80 TABLET, CHEWABLE ORAL at 11:21

## 2021-04-05 RX ADMIN — PAROXETINE HYDROCHLORIDE 20 MG: 20 TABLET, FILM COATED ORAL at 08:30

## 2021-04-05 RX ADMIN — APIXABAN 5 MG: 5 TABLET, FILM COATED ORAL at 08:30

## 2021-04-05 RX ADMIN — OMEPRAZOLE 20 MG: 20 CAPSULE, DELAYED RELEASE ORAL at 06:17

## 2021-04-05 RX ADMIN — DIGOXIN 125 MCG: 125 TABLET ORAL at 17:01

## 2021-04-05 RX ADMIN — MIDODRINE HYDROCHLORIDE 5 MG: 5 TABLET ORAL at 08:30

## 2021-04-05 RX ADMIN — ATORVASTATIN CALCIUM 40 MG: 40 TABLET, FILM COATED ORAL at 17:00

## 2021-04-05 RX ADMIN — CLONAZEPAM 0.5 MG: 0.5 TABLET ORAL at 08:30

## 2021-04-05 RX ADMIN — MIDODRINE HYDROCHLORIDE 5 MG: 5 TABLET ORAL at 16:59

## 2021-04-05 RX ADMIN — POTASSIUM CHLORIDE 20 MEQ: 20 TABLET, EXTENDED RELEASE ORAL at 06:17

## 2021-04-05 RX ADMIN — SIMETHICONE 80 MG: 80 TABLET, CHEWABLE ORAL at 17:01

## 2021-04-05 ASSESSMENT — PAIN DESCRIPTION - PAIN TYPE: TYPE: CHRONIC PAIN

## 2021-04-05 NOTE — DISCHARGE PLANNING
@7052  Agency/Facility Name: [facility]  Outcome: Called again twice and still no answer.    @5771  Agency/Facility Name: [facility]  Outcome: Called 3 times no one picked up, will try again later.

## 2021-04-05 NOTE — PROGRESS NOTES
Assumed care at  0645. Patient in bed, sitter at bed side to maintain safety. Will continue to monitor for additional needs.

## 2021-04-05 NOTE — CARE PLAN
Problem: Safety  Goal: Will remain free from injury  Outcome: PROGRESSING AS EXPECTED  Note: Sitter is in the room     Problem: Bowel/Gastric:  Goal: Normal bowel function is maintained or improved  Outcome: PROGRESSING AS EXPECTED  Flowsheets  Taken 4/4/2021 2100 by Chaim Downs R.N.  Last BM: 04/04/21  Taken 3/27/2021 1445 by Rai Zafar, C.N.A.  Number of Times Stooled: 1  Note: Colostomy is taken care of by staff, emptied, cleaned, and burped. Pt refuses to maintain colostomy.     Problem: Psychosocial Needs:  Goal: Level of anxiety will decrease  Outcome: PROGRESSING AS EXPECTED  Flowsheets (Taken 4/4/2021 2100)  Patient Behaviors: Flat Affect  Note: Sitter is in the room for safety

## 2021-04-05 NOTE — PROGRESS NOTES
"Date of Service  04/05/21     Chief Complaint  56 y.o. male admitted 2/9/2021 with syncope     Hospital Course  Mr. Castro is a 56 year-old man with a PMHx of significant for atrial fibrillation (secondary hypercoagulable state), diabetes mellitus, diabetic neuropathy, systolic congestive heart failure,  anddyslipidemia presented to the ER on 2/9/2021 after he had a syncopal episode. He has a history of orthostatic hypotension, with ongoing orthostatic changes and was started on fludrocortisone.  For the event, patient complained of significant dizziness. In the ER, patient was hypotensive and bradycardic, therefore cardiology was consulted and per their recommendations all AV sanket blocking agents were stopped.     Echocardiogram was obtained which showed EF of 60%, improved from previous echocardiogram obtained on 11/2020.     He also has history of a splenic flexure perforation in September 2020, which resulted in a colostomy and Inetriano's pouch in November 2020. He was at home taking care of his colostomy, however he has become depressed, overwhelmed and states he can no longer take care of his colostomy independently and is suicidal.  He was placed on a legal hold and psychiatry is following. Patient awaiting acceptance to psych facility  [facility] could reportedly accept him in May (6 months maturity of ostomy) and have placed him on their wait list. Patient transferred to the longterm care team on 4/5/2021.     Interval Problem Update  -Patient seen and examined.  Patient appears withdrawn, with some evidence of suicidal thoughts.  Patient reports feeling tired, and \"tired of living \".  Patient is alert and oriented.  Sitter at bedside.  Patient updated in plan of care.  -Plan of care: Continue supportive care; monitor for safety; patient awaiting acceptance to psych facility [facility], of which he is on a waiting list.  Continue suicide precaution.  -Lab work: Reviewed; last obtained on 3/30/2021 -unremarkable; " we will order labs as warranted  -VSS at this time    ============================================================================================================  Please note that this dictation was created using voice recognition software. I have made every reasonable attempt to correct obvious errors, but there may be errors of grammar and possibly content that I did not discover before finalizing the note.    Electronically signed by:  CHICO Benson, MSN, APRN, FNP-C  Hospitalist Services  [facility]  [phone]  [email or web address]  04/05/21     1031

## 2021-04-05 NOTE — CONSULTS
"PSYCHIATRIC FOLLOW-UP:(established)  *Reason for admission:      Syncope, hypotension.  Being followed for SI  *Legal Hold Status on Admission:    Court commitment for 6 months       Chart reviewed.         *HPI: Continues to endorse depression with suicidal thoughts.  Unchanged auditory hallucinations.  Discussed with patient the possibility for SNF, which she declined at this time and requested to go to a psychiatric hospital stating \" my friend I am going to kill myself\".  Patient reports having insomnia, however was found sleeping during rounds in the afternoon.  Patient has fair appetite and energy.            *Psychiatric Examination:  Vitals:   Vitals:    04/05/21 0814   BP: 123/97   Pulse: 65   Resp: 18   Temp: 36.4 °C (97.5 °F)   SpO2: 91%     Appearance: appears stated age, fair grooming and hygiene, calm, cooperative, good eye contact  Abnormal movements: none  Gait/posture: normal  Speech: normal volume, tone and rhythm  Though process: linear and goal oriented  Associations: no loose associations  Thought content: +AH changed, no delusions or paranoia elicited, does not appear to be responding to internal stimuli, neither is internally preoccupied.   Judgement and Insight: fair/fair  Orientation: Grossly oriented  Recent and Remote Memory: intact  Attention Span and Concentration: intact  Language: fluid   Fund of Knowledge: not tested   Mood and Affect:\" Pretty depressed\", constricted but reactive  SI/HI:denies any active or passive SI/HI           *Labs personally reviewed:   Recent Results (from the past 72 hour(s))   ACCU-CHEK GLUCOSE    Collection Time: 04/02/21  4:40 PM   Result Value Ref Range    Glucose - Accu-Ck 117 (H) 65 - 99 mg/dL   ACCU-CHEK GLUCOSE    Collection Time: 04/02/21  8:36 PM   Result Value Ref Range    Glucose - Accu-Ck 121 (H) 65 - 99 mg/dL   ACCU-CHEK GLUCOSE    Collection Time: 04/03/21 11:43 AM   Result Value Ref Range    Glucose - Accu-Ck 119 (H) 65 - 99 mg/dL   ACCU-CHEK " GLUCOSE    Collection Time: 04/03/21  4:43 PM   Result Value Ref Range    Glucose - Accu-Ck 115 (H) 65 - 99 mg/dL   ACCU-CHEK GLUCOSE    Collection Time: 04/03/21  8:41 PM   Result Value Ref Range    Glucose - Accu-Ck 203 (H) 65 - 99 mg/dL   ACCU-CHEK GLUCOSE    Collection Time: 04/04/21  7:22 AM   Result Value Ref Range    Glucose - Accu-Ck 125 (H) 65 - 99 mg/dL   ACCU-CHEK GLUCOSE    Collection Time: 04/04/21 12:00 PM   Result Value Ref Range    Glucose - Accu-Ck 121 (H) 65 - 99 mg/dL   ACCU-CHEK GLUCOSE    Collection Time: 04/04/21  5:12 PM   Result Value Ref Range    Glucose - Accu-Ck 114 (H) 65 - 99 mg/dL   ACCU-CHEK GLUCOSE    Collection Time: 04/04/21  8:46 PM   Result Value Ref Range    Glucose - Accu-Ck 119 (H) 65 - 99 mg/dL   ACCU-CHEK GLUCOSE    Collection Time: 04/05/21  6:21 AM   Result Value Ref Range    Glucose - Accu-Ck 115 (H) 65 - 99 mg/dL       Assessment: Patient continues to be suicidal.        Dx:  MDD, recurrent with mood congruent psychosis    Medical:  -ostomy  -HTN  -pafib  -CHF  -DM2  -hypothyroidism  -orthostatic hypotension  -SRUTHI  -secondary adrenal insufficiency  -leukocytosis  -non ischemic cardiomyopathy        Plan:  1- Legal hold: Court committed for 6 months (10/1/21)  2- Psychotropic medications: Continue current regimen  3- Please transfer pt to inpatient psychiatric hospital when bed is available  4- Psychiatry will follow up    Thank you for the consult.     Sitter:yes  Phone:yes  Visitors:yes  Personal belongings: yes    This note was created using voice recognition software (Dragon). The accuracy of the dictation is limited by the abilities of the software. I have reviewed the note prior to signing. However, error related to voice recognition software and /or scribes may still exist and should be interpreted within the appropriate context.

## 2021-04-05 NOTE — PROGRESS NOTES
Per hospitalist nursing pt has now been transferred to the [facility] long term service starting today

## 2021-04-05 NOTE — DISCHARGE PLANNING
Anticipated Discharge Disposition:   Inpatient Psych    Action:   Per chart review, pt on a 6 month commitment. DPA to follow up with facilities.    Barriers to Discharge:   Placement acceptance and bed availability.    Plan:   Follow up with DPA.  Hospital Care Management will continue to follow and assist with discharge planning needs.

## 2021-04-06 PROBLEM — R65.20 SEPSIS WITH ACUTE RENAL FAILURE WITHOUT SEPTIC SHOCK (HCC): Status: RESOLVED | Noted: 2020-11-08 | Resolved: 2021-04-06

## 2021-04-06 PROBLEM — R14.0 ABDOMINAL BLOATING: Status: RESOLVED | Noted: 2021-03-12 | Resolved: 2021-04-06

## 2021-04-06 PROBLEM — E87.6 HYPOKALEMIA: Status: RESOLVED | Noted: 2017-11-11 | Resolved: 2021-04-06

## 2021-04-06 PROBLEM — A41.9 SEPSIS WITH ACUTE RENAL FAILURE WITHOUT SEPTIC SHOCK (HCC): Status: RESOLVED | Noted: 2020-11-08 | Resolved: 2021-04-06

## 2021-04-06 PROBLEM — E87.1 HYPONATREMIA: Status: RESOLVED | Noted: 2017-11-11 | Resolved: 2021-04-06

## 2021-04-06 PROBLEM — N17.9 SEPSIS WITH ACUTE RENAL FAILURE WITHOUT SEPTIC SHOCK (HCC): Status: RESOLVED | Noted: 2020-11-08 | Resolved: 2021-04-06

## 2021-04-06 PROBLEM — E87.20 NORMAL ANION GAP METABOLIC ACIDOSIS: Status: RESOLVED | Noted: 2021-03-10 | Resolved: 2021-04-06

## 2021-04-06 PROBLEM — E83.42 HYPOMAGNESEMIA: Status: RESOLVED | Noted: 2017-11-15 | Resolved: 2021-04-06

## 2021-04-06 LAB
ANION GAP SERPL CALC-SCNC: 7 MMOL/L (ref 7–16)
BUN SERPL-MCNC: 27 MG/DL (ref 8–22)
CALCIUM SERPL-MCNC: 9.5 MG/DL (ref 8.5–10.5)
CHLORIDE SERPL-SCNC: 101 MMOL/L (ref 96–112)
CO2 SERPL-SCNC: 29 MMOL/L (ref 20–33)
CREAT SERPL-MCNC: 1.14 MG/DL (ref 0.5–1.4)
GLUCOSE BLD-MCNC: 104 MG/DL (ref 65–99)
GLUCOSE BLD-MCNC: 118 MG/DL (ref 65–99)
GLUCOSE BLD-MCNC: 127 MG/DL (ref 65–99)
GLUCOSE BLD-MCNC: 156 MG/DL (ref 65–99)
GLUCOSE SERPL-MCNC: 106 MG/DL (ref 65–99)
POTASSIUM SERPL-SCNC: 4.4 MMOL/L (ref 3.6–5.5)
SODIUM SERPL-SCNC: 137 MMOL/L (ref 135–145)

## 2021-04-06 PROCEDURE — A9270 NON-COVERED ITEM OR SERVICE: HCPCS | Performed by: HOSPITALIST

## 2021-04-06 PROCEDURE — 700102 HCHG RX REV CODE 250 W/ 637 OVERRIDE(OP): Performed by: STUDENT IN AN ORGANIZED HEALTH CARE EDUCATION/TRAINING PROGRAM

## 2021-04-06 PROCEDURE — 99231 SBSQ HOSP IP/OBS SF/LOW 25: CPT | Performed by: NURSE PRACTITIONER

## 2021-04-06 PROCEDURE — 700102 HCHG RX REV CODE 250 W/ 637 OVERRIDE(OP): Performed by: FAMILY MEDICINE

## 2021-04-06 PROCEDURE — A9270 NON-COVERED ITEM OR SERVICE: HCPCS | Performed by: STUDENT IN AN ORGANIZED HEALTH CARE EDUCATION/TRAINING PROGRAM

## 2021-04-06 PROCEDURE — 770001 HCHG ROOM/CARE - MED/SURG/GYN PRIV*

## 2021-04-06 PROCEDURE — 80048 BASIC METABOLIC PNL TOTAL CA: CPT

## 2021-04-06 PROCEDURE — 82962 GLUCOSE BLOOD TEST: CPT | Mod: 91

## 2021-04-06 PROCEDURE — 36415 COLL VENOUS BLD VENIPUNCTURE: CPT

## 2021-04-06 PROCEDURE — A9270 NON-COVERED ITEM OR SERVICE: HCPCS | Performed by: FAMILY MEDICINE

## 2021-04-06 PROCEDURE — 700102 HCHG RX REV CODE 250 W/ 637 OVERRIDE(OP): Performed by: HOSPITALIST

## 2021-04-06 RX ADMIN — SIMETHICONE 80 MG: 80 TABLET, CHEWABLE ORAL at 17:34

## 2021-04-06 RX ADMIN — MIDODRINE HYDROCHLORIDE 5 MG: 5 TABLET ORAL at 13:24

## 2021-04-06 RX ADMIN — INSULIN GLARGINE 5 UNITS: 100 INJECTION, SOLUTION SUBCUTANEOUS at 06:11

## 2021-04-06 RX ADMIN — FLUDROCORTISONE ACETATE 0.1 MG: 0.1 TABLET ORAL at 05:57

## 2021-04-06 RX ADMIN — ZIPRASIDONE HYDROCHLORIDE 60 MG: 60 CAPSULE ORAL at 08:56

## 2021-04-06 RX ADMIN — OMEPRAZOLE 20 MG: 20 CAPSULE, DELAYED RELEASE ORAL at 05:57

## 2021-04-06 RX ADMIN — POTASSIUM CHLORIDE 20 MEQ: 20 TABLET, EXTENDED RELEASE ORAL at 05:57

## 2021-04-06 RX ADMIN — PAROXETINE HYDROCHLORIDE 20 MG: 20 TABLET, FILM COATED ORAL at 08:56

## 2021-04-06 RX ADMIN — MIDODRINE HYDROCHLORIDE 5 MG: 5 TABLET ORAL at 17:34

## 2021-04-06 RX ADMIN — APIXABAN 5 MG: 5 TABLET, FILM COATED ORAL at 08:56

## 2021-04-06 RX ADMIN — ATORVASTATIN CALCIUM 40 MG: 40 TABLET, FILM COATED ORAL at 17:34

## 2021-04-06 RX ADMIN — CLONAZEPAM 0.5 MG: 0.5 TABLET ORAL at 08:57

## 2021-04-06 RX ADMIN — ZIPRASIDONE HYDROCHLORIDE 60 MG: 60 CAPSULE ORAL at 21:26

## 2021-04-06 RX ADMIN — MIDODRINE HYDROCHLORIDE 5 MG: 5 TABLET ORAL at 08:56

## 2021-04-06 RX ADMIN — APIXABAN 5 MG: 5 TABLET, FILM COATED ORAL at 21:26

## 2021-04-06 RX ADMIN — CLONAZEPAM 1 MG: 1 TABLET ORAL at 17:34

## 2021-04-06 RX ADMIN — LEVOTHYROXINE SODIUM 112 MCG: 0.11 TABLET ORAL at 05:57

## 2021-04-06 RX ADMIN — INSULIN HUMAN 1 UNITS: 100 INJECTION, SOLUTION PARENTERAL at 13:24

## 2021-04-06 RX ADMIN — SIMETHICONE 80 MG: 80 TABLET, CHEWABLE ORAL at 13:25

## 2021-04-06 RX ADMIN — SIMETHICONE 80 MG: 80 TABLET, CHEWABLE ORAL at 05:57

## 2021-04-06 RX ADMIN — TAMSULOSIN HYDROCHLORIDE 0.4 MG: 0.4 CAPSULE ORAL at 05:57

## 2021-04-06 RX ADMIN — DIGOXIN 125 MCG: 125 TABLET ORAL at 17:34

## 2021-04-06 NOTE — NON-PROVIDER
Hospital Medicine Daily Progress Note     Date of Service  3/12/2021     Chief Complaint  Syncope and suicide ideation     Hospital Course  56 year old male with past medical history of atrial fibrillation, secondary hypercoagulable state, diabetes mellitus, diabetic neuropathy, CHFpEF, orthostatic hypotension,  and dyslipidemia admitted 2/9/21 after a syncopal event. Patient had complaint of significant dizziness upon standing. Patient was hypotensive and bradycardic in the ER. Cardiology was consulted and AB sanket blockers with discontinued per their recommendations.     Patient had a colostomy and Interiano's pouch in November 2020 following a splenic flexure perforation. The patient was caring for his colostomy at home, however, he has become depressed and overwhelmed. He states that he can no longer care for his colostomy and is suicidal. The patient remains on a legal hold with psychiatry following.      Interval Problem Update  Patient states that he is still suicidal and wants to slit his own throat. He remains calm and cooperative with care. Patient safety advocate at bedside. The patient states that he continues to be dizzy whenever he tries to sit up or stand. Orthostatic BPs were taken and there continues to be a significant drop in BP upon sitting and standing.      Consultants/Specialty  Psychiatry  Cardiology-signed off, reconsult as needed   General Surgery-signed off     Code Status  Full code     Disposition  Awaiting psych facility acceptance.  On [facility] waiting list with reported acceptance with ostomy maturity (May).     Review of Systems  Review of Systems   Constitutional:  Expresses poor sleep and appetite, weakness, and fatigue  Respiratory: Denies shortness of breath, cough, or sputum production  Cardiovascular:  Denies chest pain or palpitations.  Endorses dizziness upon sitting/standing and some lower extremity edema.  Gastrointestinal:   Denies N/V/D.  Genitourinary: Denies difficult  "urination, frequency, or retention.   Neurological: Denies headache or changes in mentation.   Psychiatric/Behavioral: States wanting to harm himself by \"slicing my throat open\"  All other systems reviewed and are negative.     Physical Exam  /64   Pulse 68   Temp 36.4 °C (97.6 °F) (Temporal)   Resp 19   Ht 1.829 m (6')   Wt 118 kg (259 lb 8 oz)   SpO2 94%   BMI 35.19 kg/m²     Physical Exam   Constitutional:  Alert and cooperative. No acute distress noted.        HENT: Normocephalic, mucous membranes moist, trachea midline       Eyes: PERRLA      Cardiovascular: Normal rate and rhythm, no murmur, rub or gallop.        Pulmonary: Normal respiratory effort. Clear throughout to auscultation        Abdominal: Soft and flat. Normoactive bowel sounds. Ostomy stoma pink, CDI, with soft brown stool.        Musculoskeletal: Normal ROM. No joint swelling or deformity.      Skin: color appropriate for ethnicity. Warm, clean, dry, and intact.        Neurological: Cranial nerves grossly intact. AOx4. Strength 4/5       Psychiatric:   Calm and cooperative. States wish to harm himself Suicidal         Fluids  No intake or output data in the 24 hours ending 03/12/21 1405     Laboratory  No new labs    Imaging  No new imaging    Assessment/Plan  Suicidal Ideation   -Has history of MDD; recurrent with psychotic features and active plan for suicide.   -Psychiatry following   -Continue psychiatric medications   -On legal hold with safety advocate at bedside.    -Awaiting acceptance to psychiatric program; [facility] will accept patient in May.   Major depressive disorder   -psychiatry/psychology following   -continue paroxetine, ziprasidone, and clonazepam  Secondary adrenal insufficiency   -Continue fludrocortisone; discontinue tamsulosin   -Start hydrocortisone 10 mg po BID   -Repeat am BMP   -Will need endocrinology follow up as outpatient  Syncope secondary to Orthostatic hypotension   -Multifactorial including adrenal " insufficiency, medications, venous insufficiency, and diabetic  autonomic dysfunction   -Discontinue metoprolol and tamsulosin; heart rate controlled on current digoxin and improved EF to  60%.   -Allow permissive hypertension  History of Paroxysmal atrial fibrillation   -Continue digoxin and eliquis  CHFpEF   -EF 60% on last echo   -Holding metoprolol and lisinopril due to orthostatic hypotension  Diabetes mellitus type 2, insulin dependent- controlled   -A1c 5.2   -Diabetic diet   -Continue lantus and SSI  Hypothyroidism   -Continue levothyroxine  CKD stage 3   -follow intermittently  Colostomy present   -s/p segmental Interiano's procedure and colostomy following a splenic flexure perforation in Nov. 2020   -High risk of complications with ostomy reversal due to current medical complexities; Surgery with follow-up outpatient  when medical conditions optimized.    -patient needing encouragement with ostomy care; non-participatory   -will need to be independent with ostomy care prior to accepantance to a [facility] facility  Debility   -likely due to depression and orthostatic hypotension   -PT/OT evaluated, no further skills needed   -Uses walker but remains dizzy upon standing   Chronic venous insufficiency of BLE   -compression stockings  Nonischemic cardiomyopathy   -No chest pain   -Normal coronaries 2018  Dyslipidemia   -Continue atorvastatin  Medically Significant Excessive Weight   -BMI 37.79   -Continued diet and exercise counseling        VTE prophylaxis: Epixiban

## 2021-04-06 NOTE — PROGRESS NOTES
Assumed carea t 0645. Patient in bed, a&ox4. Appetite  this morning, eating only 25% of tray. Sitter at bed side. Will continue to monitor for additional needs.

## 2021-04-06 NOTE — CARE PLAN
Patient verbalized that distractions such as puzzles usually help with decreasing SI. Word searches printed for patient.

## 2021-04-07 PROBLEM — I95.1 SYNCOPE DUE TO ORTHOSTATIC HYPOTENSION: Status: RESOLVED | Noted: 2021-02-15 | Resolved: 2021-04-07

## 2021-04-07 PROBLEM — I10 ESSENTIAL HYPERTENSION: Status: RESOLVED | Noted: 2018-02-08 | Resolved: 2021-04-07

## 2021-04-07 PROBLEM — R78.81 BACTEREMIA: Status: RESOLVED | Noted: 2021-03-13 | Resolved: 2021-04-07

## 2021-04-07 PROBLEM — R07.9 CHEST PAIN: Status: RESOLVED | Noted: 2018-01-28 | Resolved: 2021-04-07

## 2021-04-07 PROBLEM — E66.812 CLASS 2 SEVERE OBESITY WITH SERIOUS COMORBIDITY AND BODY MASS INDEX (BMI) OF 35.0 TO 35.9 IN ADULT (HCC): Status: ACTIVE | Noted: 2017-11-11

## 2021-04-07 PROBLEM — N18.30 ACUTE RENAL FAILURE SUPERIMPOSED ON STAGE 3 CHRONIC KIDNEY DISEASE (HCC): Status: RESOLVED | Noted: 2020-11-11 | Resolved: 2021-04-07

## 2021-04-07 PROBLEM — I50.1 CARDIOGENIC PULMONARY EDEMA (HCC): Status: RESOLVED | Noted: 2021-03-13 | Resolved: 2021-04-07

## 2021-04-07 PROBLEM — R21 RASH: Status: RESOLVED | Noted: 2021-03-16 | Resolved: 2021-04-07

## 2021-04-07 PROBLEM — E66.01 CLASS 2 SEVERE OBESITY WITH SERIOUS COMORBIDITY AND BODY MASS INDEX (BMI) OF 35.0 TO 35.9 IN ADULT (HCC): Status: ACTIVE | Noted: 2017-11-11

## 2021-04-07 PROBLEM — R79.89 ELEVATED TROPONIN: Status: RESOLVED | Noted: 2021-02-09 | Resolved: 2021-04-07

## 2021-04-07 PROBLEM — I42.8 NONISCHEMIC CARDIOMYOPATHY (HCC): Status: RESOLVED | Noted: 2018-09-09 | Resolved: 2021-04-07

## 2021-04-07 PROBLEM — N17.9 ACUTE RENAL FAILURE SUPERIMPOSED ON STAGE 3 CHRONIC KIDNEY DISEASE (HCC): Status: RESOLVED | Noted: 2020-11-11 | Resolved: 2021-04-07

## 2021-04-07 LAB
ANION GAP SERPL CALC-SCNC: 9 MMOL/L (ref 7–16)
BUN SERPL-MCNC: 23 MG/DL (ref 8–22)
CALCIUM SERPL-MCNC: 9.5 MG/DL (ref 8.5–10.5)
CHLORIDE SERPL-SCNC: 101 MMOL/L (ref 96–112)
CO2 SERPL-SCNC: 26 MMOL/L (ref 20–33)
CREAT SERPL-MCNC: 1.02 MG/DL (ref 0.5–1.4)
GLUCOSE BLD-MCNC: 116 MG/DL (ref 65–99)
GLUCOSE BLD-MCNC: 125 MG/DL (ref 65–99)
GLUCOSE BLD-MCNC: 135 MG/DL (ref 65–99)
GLUCOSE SERPL-MCNC: 121 MG/DL (ref 65–99)
MAGNESIUM SERPL-MCNC: 1.8 MG/DL (ref 1.5–2.5)
POTASSIUM SERPL-SCNC: 4.4 MMOL/L (ref 3.6–5.5)
SODIUM SERPL-SCNC: 136 MMOL/L (ref 135–145)

## 2021-04-07 PROCEDURE — A9270 NON-COVERED ITEM OR SERVICE: HCPCS | Performed by: STUDENT IN AN ORGANIZED HEALTH CARE EDUCATION/TRAINING PROGRAM

## 2021-04-07 PROCEDURE — A9270 NON-COVERED ITEM OR SERVICE: HCPCS | Performed by: HOSPITALIST

## 2021-04-07 PROCEDURE — 700102 HCHG RX REV CODE 250 W/ 637 OVERRIDE(OP): Performed by: STUDENT IN AN ORGANIZED HEALTH CARE EDUCATION/TRAINING PROGRAM

## 2021-04-07 PROCEDURE — A9270 NON-COVERED ITEM OR SERVICE: HCPCS | Performed by: FAMILY MEDICINE

## 2021-04-07 PROCEDURE — 770001 HCHG ROOM/CARE - MED/SURG/GYN PRIV*

## 2021-04-07 PROCEDURE — 700102 HCHG RX REV CODE 250 W/ 637 OVERRIDE(OP): Performed by: HOSPITALIST

## 2021-04-07 PROCEDURE — 82962 GLUCOSE BLOOD TEST: CPT

## 2021-04-07 PROCEDURE — 700102 HCHG RX REV CODE 250 W/ 637 OVERRIDE(OP): Performed by: FAMILY MEDICINE

## 2021-04-07 PROCEDURE — 700102 HCHG RX REV CODE 250 W/ 637 OVERRIDE(OP): Performed by: NURSE PRACTITIONER

## 2021-04-07 PROCEDURE — 80048 BASIC METABOLIC PNL TOTAL CA: CPT

## 2021-04-07 PROCEDURE — A9270 NON-COVERED ITEM OR SERVICE: HCPCS | Performed by: NURSE PRACTITIONER

## 2021-04-07 PROCEDURE — 83735 ASSAY OF MAGNESIUM: CPT

## 2021-04-07 PROCEDURE — 36415 COLL VENOUS BLD VENIPUNCTURE: CPT

## 2021-04-07 RX ORDER — HYDROCORTISONE 10 MG/1
10 TABLET ORAL EVERY MORNING
Status: DISCONTINUED | OUTPATIENT
Start: 2021-04-08 | End: 2021-06-13

## 2021-04-07 RX ORDER — HYDROCORTISONE 5 MG/1
5 TABLET ORAL
Status: DISCONTINUED | OUTPATIENT
Start: 2021-04-07 | End: 2021-06-13

## 2021-04-07 RX ADMIN — MIDODRINE HYDROCHLORIDE 5 MG: 5 TABLET ORAL at 17:24

## 2021-04-07 RX ADMIN — DIGOXIN 125 MCG: 125 TABLET ORAL at 17:24

## 2021-04-07 RX ADMIN — SIMETHICONE 80 MG: 80 TABLET, CHEWABLE ORAL at 11:47

## 2021-04-07 RX ADMIN — ATORVASTATIN CALCIUM 40 MG: 40 TABLET, FILM COATED ORAL at 17:24

## 2021-04-07 RX ADMIN — SIMETHICONE 80 MG: 80 TABLET, CHEWABLE ORAL at 04:42

## 2021-04-07 RX ADMIN — HYDROCORTISONE 5 MG: 10 TABLET ORAL at 17:24

## 2021-04-07 RX ADMIN — CLONAZEPAM 1 MG: 1 TABLET ORAL at 17:24

## 2021-04-07 RX ADMIN — CLONAZEPAM 0.5 MG: 0.5 TABLET ORAL at 07:46

## 2021-04-07 RX ADMIN — LEVOTHYROXINE SODIUM 112 MCG: 0.11 TABLET ORAL at 04:42

## 2021-04-07 RX ADMIN — APIXABAN 5 MG: 5 TABLET, FILM COATED ORAL at 07:46

## 2021-04-07 RX ADMIN — INSULIN GLARGINE 5 UNITS: 100 INJECTION, SOLUTION SUBCUTANEOUS at 06:43

## 2021-04-07 RX ADMIN — PAROXETINE HYDROCHLORIDE 20 MG: 20 TABLET, FILM COATED ORAL at 07:46

## 2021-04-07 RX ADMIN — SIMETHICONE 80 MG: 80 TABLET, CHEWABLE ORAL at 17:23

## 2021-04-07 RX ADMIN — POTASSIUM CHLORIDE 20 MEQ: 20 TABLET, EXTENDED RELEASE ORAL at 04:42

## 2021-04-07 RX ADMIN — ZIPRASIDONE HYDROCHLORIDE 60 MG: 60 CAPSULE ORAL at 20:52

## 2021-04-07 RX ADMIN — OMEPRAZOLE 20 MG: 20 CAPSULE, DELAYED RELEASE ORAL at 04:42

## 2021-04-07 RX ADMIN — APIXABAN 5 MG: 5 TABLET, FILM COATED ORAL at 20:00

## 2021-04-07 RX ADMIN — FLUDROCORTISONE ACETATE 0.1 MG: 0.1 TABLET ORAL at 04:42

## 2021-04-07 RX ADMIN — MIDODRINE HYDROCHLORIDE 5 MG: 5 TABLET ORAL at 11:47

## 2021-04-07 RX ADMIN — ZIPRASIDONE HYDROCHLORIDE 60 MG: 60 CAPSULE ORAL at 07:46

## 2021-04-07 RX ADMIN — MIDODRINE HYDROCHLORIDE 5 MG: 5 TABLET ORAL at 07:46

## 2021-04-07 RX ADMIN — METOPROLOL SUCCINATE 25 MG: 25 TABLET, EXTENDED RELEASE ORAL at 04:42

## 2021-04-07 ASSESSMENT — ENCOUNTER SYMPTOMS
INSOMNIA: 0
VOMITING: 0
DEPRESSION: 1
HEADACHES: 0
DIARRHEA: 0
SHORTNESS OF BREATH: 0
CONSTIPATION: 0
FOCAL WEAKNESS: 0
FEVER: 0
ABDOMINAL PAIN: 0
SENSORY CHANGE: 0
NERVOUS/ANXIOUS: 0
DIZZINESS: 0
COUGH: 0
SPEECH CHANGE: 0
PALPITATIONS: 0
WHEEZING: 0
NAUSEA: 0
WEAKNESS: 0
BLOOD IN STOOL: 0

## 2021-04-07 ASSESSMENT — PAIN DESCRIPTION - PAIN TYPE: TYPE: CHRONIC PAIN

## 2021-04-07 NOTE — CARE PLAN
Care Plan:  Problem: Communication  Goal: The ability to communicate needs accurately and effectively will improve  Outcome: PROGRESSING AS EXPECTED- Pt can communicate needs & utilizes call light appropriately.     Problem: Safety  Goal: Will remain free from injury  Outcome: PROGRESSING AS EXPECTED- 1:1 Sitter in place for ID, bed alarm on.  Goal: Will remain free from falls  Outcome: PROGRESSING AS EXPECTED- Calls appropriate, bed alarm on.     Problem: Bowel/Gastric:  Goal: Will not experience complications related to bowel motility  Outcome: PROGRESSING AS EXPECTED- Colostomy in place, adequate output.     Problem: Discharge Barriers/Planning  Goal: Patient's continuum of care needs will be met  Outcome: NOT MET- Pt will likely remain hospitalized until appropriate placement can be found. Pt did not DC this week like expected.     Problem: Psychosocial Needs:  Goal: Level of anxiety will decrease  Outcome: PROGRESSING AS EXPECTED-  Pt is calm, cooperative, and looked back on today as a positive one.     Problem: Mobility  Goal: Risk for activity intolerance will decrease  Outcome: PROGRESSING AS EXPECTED- Ambulated with fww & assistance x1 today.     Problem: Potential for Self Harm  Goal: No active self-harm  Outcome: PROGRESSING AS EXPECTED  Goal: Effective symptom management  Outcome: PROGRESSING AS EXPECTED  Goal: Resolve acute exacerbation and return to psychiatric baseline  Outcome: PROGRESSING AS EXPECTED     Problem: Ineffective Coping  Goal: No active self-harm  Outcome: PROGRESSING AS EXPECTED  Goal: Effective symptom management  Outcome: PROGRESSING AS EXPECTED  Goal: Resolve acute exacerbation and return to psychiatric baseline  Outcome: PROGRESSING AS EXPECTED     Problem: Urinary Elimination:  Goal: Ability to reestablish a normal urinary elimination pattern will improve  Outcome: PROGRESSING AS EXPECTED

## 2021-04-07 NOTE — CONSULTS
"Brief Behavioral Health Note:    Length of intervention: 30  minutes    Summary of Visit:  Patient was seen lying flat on the bed with legs and arms shaking under the covers. Patient states, \"I am having a panic attack\". Patient was unable to discuss what brought on the attack. Patient continues to shake his legs and arms while talking. Patient reports ongoing suicidal ideations without a clear plan or intent. Patient believes inpatient psychiatric treatment is what he needs at this time.  After leaving the room, observed patient having colostomy bag changed while he continued to lay flat on the bed. His legs or arms were no longer shaking. Observed RN working on the colostomy bag. As I re-entered the room I asked if patient was able to participate as the goal is to work towards his independence. RN replied yes. Later, when RN left the room, he mentioned patient was not in fact participating. I asked if patient could sit up and be placed in a position to assist, he said yes.  I am unclear if patient eventually participated in the colostomy care.     Assessment  Patient continues to present as dependent requiring total care although he is able and has previously demonstrated his ability to manage his own care. This will continue to be a challenge until the expectation of patient's independence is consistently required. Patient's placement in a psychiatric hospital is dependent upon his independence. Patient continues to endorse suicidal ideations without a plan or clear intent. No reported auditory or visual hallucinations.     Recommendation  1- Legal hold: Court committed for 6 months (10/1/21)  2. Please require patient's participation in colostomy care working towards independence  3- Please transfer pt to inpatient psychiatric hospital when bed is available  4. Continue one to one observation       Phone:yes  Visitors:yes  Personal belongings: yes    Thank you    Susan Ewing, Ph.D., [facility]  "

## 2021-04-07 NOTE — PROGRESS NOTES
Hospital Medicine Twice Weekly Progress Note    Date of Service  4/6/2021    Chief Complaint  Suicidal ideation    Hospital Course  Mr. Castro is a 56 year-old man with a past medical history significant for atrial fibrillation, secondary hypercoagulable state, diabetes mellitus complicated by diabetic neuropathy, chronic systolic CHF, colostomy placement, orthostatic hypotension (maintained on florinef), and dyslipidemia who presented to the ED on 2/9/2021 after he became dizzy and had a syncopal episode at home.  In the ED he was noted to be hypotensive and bradycardic.  Cardiology was consulted and recommended stopping all AV sanket blocking medications.  He remained orthostatic during his hospitalization so midodrine was started.  An echocardiogram was obtained and showed mild mitral regurgitation and an EF of 60% which was improved from his previous echocardiogram obtained on 11/2020 (previously 35%).  CTAs of the head and neck were done and were unremarkable outside of distal bilateral ICA tortuosity.  Also during his hospitalization he expressed desire to kill himself.  He has a colostomy and has become overwhelmed by caring for it.  He was subsequently placed on a legal hold and psychiatry was consulted.  He is now pending [facility] placement at the 6-month db for maturity of his colostomy.    Interval Problem Update  -Remains suicidal, has a plan to slit his throat.  Sitter is at the bedside.  -Psychiatry is following, we appreciate their assistance.  -Colostomy site intact.  -Denies pain or other problems.    Consultants/Specialty  Cardiology  Psychiatry  General surgery    Code Status  Full Code    Disposition  Pending [facility] placement, likely to happen in May at the maturation of his colostomy.    Review of Systems  Review of Systems   Constitutional: Negative for fever and malaise/fatigue.   Respiratory: Negative for cough, shortness of breath and wheezing.    Cardiovascular: Positive for leg swelling.  Negative for chest pain and palpitations.   Gastrointestinal: Negative for abdominal pain, blood in stool, constipation, diarrhea (Liquid stool present within his colostomy), melena, nausea and vomiting.   Genitourinary: Negative for dysuria, frequency and urgency.   Neurological: Negative for dizziness, sensory change, speech change, focal weakness, weakness and headaches.   Psychiatric/Behavioral: Positive for depression and suicidal ideas. The patient is not nervous/anxious and does not have insomnia.    All other systems reviewed and are negative.     Physical Exam  Temp:  [36 °C (96.8 °F)-36.3 °C (97.3 °F)] 36 °C (96.8 °F)  Pulse:  [62-85] 74  Resp:  [17-18] 18  BP: ()/(52-78) 97/52  SpO2:  [92 %-96 %] 96 %    Physical Exam  Vitals and nursing note reviewed.   Constitutional:       General: He is awake. He is not in acute distress.     Appearance: Normal appearance. He is well-developed. He is not ill-appearing.   HENT:      Head: Normocephalic and atraumatic.      Mouth/Throat:      Lips: Pink.      Mouth: Mucous membranes are moist.   Eyes:      Conjunctiva/sclera: Conjunctivae normal.      Pupils: Pupils are equal, round, and reactive to light.   Cardiovascular:      Rate and Rhythm: Normal rate. Rhythm irregularly irregular.      Pulses: Normal pulses.      Heart sounds: Normal heart sounds.   Pulmonary:      Effort: Pulmonary effort is normal.      Breath sounds: Normal breath sounds.   Abdominal:      General: Bowel sounds are normal. There is no distension or abdominal bruit.      Palpations: Abdomen is soft.      Tenderness: There is no abdominal tenderness.       Musculoskeletal:      Cervical back: Normal range of motion and neck supple.      Right lower leg: Edema present.      Left lower leg: Edema present.   Skin:     General: Skin is warm and dry.      Coloration: Skin is pale.   Neurological:      General: No focal deficit present.      Mental Status: He is alert and oriented to person,  place, and time.   Psychiatric:         Attention and Perception: Attention and perception normal.         Mood and Affect: Affect normal. Mood is anxious and depressed.         Speech: Speech normal.         Behavior: Behavior normal. Behavior is cooperative.         Thought Content: Thought content includes suicidal ideation. Thought content includes suicidal plan.         Cognition and Memory: Memory normal.         Judgment: Judgment is impulsive and inappropriate.     Fluids    Intake/Output Summary (Last 24 hours) at 4/7/2021 1443  Last data filed at 4/7/2021 0800  Gross per 24 hour   Intake 400 ml   Output 1300 ml   Net -900 ml     Laboratory      Recent Labs     04/06/21  1611   SODIUM 137   POTASSIUM 4.4   CHLORIDE 101   CO2 29   GLUCOSE 106*   BUN 27*   CREATININE 1.14   CALCIUM 9.5     Imaging  YT-UNVSEHR-7 VIEW   Final Result         No significant interval change.      DX-CHEST-LIMITED (1 VIEW)   Final Result         Diffuse interstitial prominence could relate to mild pulmonary edema or atypical infection      EL-UZAWSHO-2 VIEW   Final Result      Increased colonic gas without definite bowel obstruction.      US-RENAL   Final Result      No evidence of hydronephrosis.      Lobulated kidneys bilaterally.      CT-ABDOMEN-PELVIS WITH   Final Result      1.  There is no evidence of small bowel obstruction.   2.  There is a left lower quadrant ostomy.   3.  There is fluid distention of the colon distal to the surgical material in the left mid descending colon extending all the way to the rectum. There is no pneumatosis or free air.   4.  There is cholelithiasis without biliary dilatation.   5.  There has been interval removal of the drainage catheter anterior to the spleen with minimal hypodense area in the anterior spleen again noted. There is no new fluid collection.      IR-US GUIDED PIV   Final Result    Ultrasound-guided PERIPHERAL IV INSERTION performed by    qualified nursing staff as above.       EC-ECHOCARDIOGRAM COMPLETE W/O CONT   Final Result      DX-LUMBAR SPINE-2 OR 3 VIEWS   Final Result      Moderate compression deformity of T11 is new compared to 2018.      Mild wedge deformity of T12 is unchanged.      Degenerative changes including facet arthropathy.      Mild retrolisthesis of L5 on S1 and L3 on L4.            Assessment/Plan  * Suicidal ideation- (present on admission)  Assessment & Plan  -With a plan to slit his throat.  -Has history of MDD, recurrent with psychotic features and active SI w/ plan.  -Currently on legal hold.  -Psychiatry following, appreciate assistance.  Medication per their discretion.  -Pending placement to [facility].   -Continue suicide precautions and sitter at bedside.    Orthostatic hypotension- (present on admission)  Assessment & Plan  -Multifactorial including adrenal insufficiency, medications (beta blocker, CCB, tamsulosin), diabetic autonomic dysfunction, and venous insufficiency.  -EF is noted to be 60%, significant improvement from prior (previously 35%).  -Per cardiology, no further cardiac work-up needed.  Recommends follow-up with them as outpatient.  -Continue midodrine.  -Fall precautions.  -Continue to educate on the importance of slow positional changes.  -Started PO hydrocortisone today for AI. Continuing florinef. Recheck orthostatics periodically.     Paroxysmal atrial fibrillation (HCC)- (present on admission)  Assessment & Plan  -CHADSVASC 3 (HTN, CHF, DM).   -TTE Feb 2021: EF 60%.  -Continue metoprolol, digoxin, and eliquis.    Chronic heart failure with preserved ejection fraction (HCC)- (present on admission)  Assessment & Plan  -Now stable.  -Pulmonary edema noted in mid-March, was placed on IV diuresis though that was held when he developed a gram-negative gina bacteremia and sepsis.  Currently doing well from a respiratory standpoint, no shortness of breath, lungs clear to auscultation bilaterally, not requiring oxygen supplementation.  -Repeat  echocardiogram showed improvement in his ejection fraction to 60%.  Recovered EF 60% on repeat echo.  -Continue metoprolol succinate.  -Lisinopril held per cardiology recommendations due to hypotension.    Diabetes mellitus type 2, insulin dependent (HCC)- (present on admission)  Assessment & Plan  -A1c 5.2% on 2/14/2021.  -Continue diabetic diet.  -Blood sugars are well controlled on current regimen.  Continue lantus at current dose with SSI as required.  -Hypoglycemia protocol in place if needed.    Major depressive disorder, recurrent, severe with psychotic features (HCC)- (present on admission)  Assessment & Plan  -Management as listed under 'anxiety and depression'.    Anxiety and depression- (present on admission)  Assessment & Plan  -Legal hold extended, awaiting acceptance at psych facility ([facility] will take in May).  -Remains suicidal.   -Psychiatry following.  -Continue paxil & geodon.     Hypothyroidism- (present on admission)  Assessment & Plan  -TFTs in Feb were WNL. Continue levothyroxine at current dose.     Adrenal insufficiency (HCC)- (present on admission)  Assessment & Plan  -Start PO hydrocortisone. Continue florinef.   -Needs outpatient endocrinology follow up.  -Monitor potassium while on florinef.   -Repeat orthostatics periodically.     Obstructive uropathy- (present on admission)  Assessment & Plan  -Trial off of flomax given his hypotension.  Discontinued 4/6/2021 and will monitor results.    Chronic venous insufficiency of lower extremity- (present on admission)  Assessment & Plan  -Continue compression stockings.    Colostomy present (HCC)- (present on admission)  Assessment & Plan  -History of fall at home Nov 2020 on left side with perforated colon and splenic fluid collection/hematoma s/p segmental colectomy, colostomy, mobilization of the splenic flexure, wound vac, and I&D Nov 2020.  -Wound care consulted for ostomy care, but he was non-participatory. He is now engaging in ostomy care  with his bedside RNs, encouraged to be observant but do it independently.  -Patient must be independently able to care for his ostomy before inpatient psychiatry will accept him.  Per surgery Dr. Desouza, high risk of ostomy reversal given his complexities. Plan for close multidisciplinary outpatient follow-up for surgery when patients overall medical conditions are more optimized.    Debility- (present on admission)  Assessment & Plan  -Cleared by PT/OT.  -Out of bed for each meal and ambulate in the hallways at least twice per shift.    Mixed hyperlipidemia- (present on admission)  Assessment & Plan  Lab Results   Component Value Date/Time    CHOLSTRLTOT 146 02/14/2021 01:30 AM    LDL 82 02/14/2021 01:30 AM    HDL 32 (A) 02/14/2021 01:30 AM    TRIGLYCERIDE 160 (H) 02/14/2021 01:30 AM   -Continue atorvastatin.     Lower limb amputation, great toe (HCC)- (present on admission)  Assessment & Plan  -History of, 2018.  Site well-healed.    Class 2 severe obesity with serious comorbidity and body mass index (BMI) of 35.0 to 35.9 in adult (HCC)- (present on admission)  Assessment & Plan  Body mass index is 35.19 kg/m².-Trending down.  -Could benefit from outpatient weight loss counseling which can be arranged through his PCP after hospital discharge.       VTE prophylaxis: Eliquis

## 2021-04-07 NOTE — WOUND TEAM
[facility]  Inpatient Services  Wound and Skin Care Progress Note    Admission Date: 2/9/2021     Last order of IP CONSULT TO WOUND CARE was found on 3/18/2021 from Hospital Encounter on 2/9/2021     HPI, PMH, SH: Reviewed    Past Surgical History:   Procedure Laterality Date   • COLECTOMY N/A 11/10/2020    Procedure: COLECTOMY-SEGMENTAL, COLOSTOMY, MOBILIZATION OF SPLENIC FLEXURE, WOUND VAC PLACEMENT, IRRIGATION AND DEBRIDMENT FLANK WOUND;  Surgeon: Kin Desouza D.O.;  Location: [facility];  Service: General   • ZZZ CARDIAC CATH  07/21/2018    EF 45%, normal coronaries.   • IRRIGATION & DEBRIDEMENT ORTHO Right 2/19/2018    Procedure: IRRIGATION & DEBRIDEMENT ORTHO-FOOT;  Surgeon: Kirby Lopez M.D.;  Location: [facility];  Service: Orthopedics   • TOE AMPUTATION Right 1/17/2018    Procedure: TOE AMPUTATION-1ST RAY;  Surgeon: Doug Delong M.D.;  Location: [facility];  Service: Orthopedics   • TOE AMPUTATION Right 11/10/2017    Procedure: TOE AMPUTATION;  Surgeon: Osorio Leo M.D.;  Location: [facility];  Service: Orthopedics     Social History     Tobacco Use   • Smoking status: Never Smoker   • Smokeless tobacco: Never Used   Substance Use Topics   • Alcohol use: No     Chief Complaint   Patient presents with   • Syncope     X1 this morning when standing up to go to bathroom     Diagnosis: Syncope and collapse [R55]  Suicidal ideation [R45.851]  Sepsis due to Klebsiella pneumoniae (HCC) [A41.4]    Unit where seen by Wound Team: S148/00     WOUND CONSULT/FOLLOW UP RELATED TO:  Sacrum & R posterior thigh follow up    WOUND HISTORY:  Pt is an older gentleman well known to [facility] wound team 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 Pt currently admitted since 2/9/21 related to SI concerns due to body image issues related to colostomy. Wound team was consulted regarding sacrum and Right posterior thigh wounds.     WOUND  ASSESSMENT/LDA      Wound 11/10/20 Full Thickness Wound Abdomen Midline resolving open surgical  (Active)   Wound Image   04/07/21 1600   Site Assessment Bleeding;Red    Periwound Assessment Dry;Clean;Intact;Scar tissue    Margins Attached edges;Defined edges    Closure Adhesive bandage    Drainage Amount Scant    Drainage Description Serosanguineous    Treatments Cleansed;Site care    Wound Cleansing Approved Wound Cleanser    Periwound Protectant Skin Protectant Wipes to Periwound    Dressing Cleansing/Solutions Not Applicable    Dressing Options Collagen Dressing;Hydrofera Blue Ready;Hypafix Tape    Dressing Changed Changed    Dressing Status Dry;Clean;Intact    Dressing Change/Treatment Frequency Every 72 hrs, and As Needed    NEXT Dressing Change/Treatment Date 04/10/21    NEXT Weekly Photo (Inpatient Only) 04/14/21    Number of Staples Removed 10    Non-staged Wound Description Full thickness    Wound Length (cm) 6 cm    Wound Width (cm) 4 cm    Wound Depth (cm) 0.1 cm    Wound Surface Area (cm^2) 24 cm^2    Wound Volume (cm^3) 2.4 cm^3    Wound Healing % 100    Wound Bed Granulation (%) 80 %    Tunneling (cm) 0 cm    Tunneling Clock Position of Wound 12    Undermining (cm) 0 cm    Shape Irregular linear    Wound Odor None    Pulses N/A    Exposed Structures None    WOUND NURSE ONLY - Time Spent with Patient (mins) 60        Wound 03/17/21 Pressure Injury Thigh Posterior Right 3/18-DTI 03/31 stage 2 (Active)   Wound Image   04/07/21 1600   Site Assessment Red;Yellow    Periwound Assessment Clean;Dry;Intact;Scar tissue    Margins Attached edges;Defined edges    Closure Adhesive bandage    Drainage Amount None    Drainage Description Serosanguineous    Treatments Cleansed;Site care;Offloading    Wound Cleansing Approved Wound Cleanser    Periwound Protectant Skin Protectant Wipes to Periwound    Dressing Cleansing/Solutions Not Applicable    Dressing Options Mepilex    Dressing Changed Changed    Dressing Status  Clean;Dry;Intact    Dressing Change/Treatment Frequency Every 72 hrs, and As Needed    NEXT Dressing Change/Treatment Date 04/10/21    NEXT Weekly Photo (Inpatient Only) 04/14/21    Pressure Injury Stage 2    Wound Length (cm) 1 cm    Wound Width (cm) 0.3 cm    Wound Depth (cm) 0.1 cm    Wound Surface Area (cm^2) 0.3 cm^2    Wound Volume (cm^3) 0.03 cm^3    Wound Healing % 70    Tunneling (cm) 0 cm    Shape Linear x1    Wound Odor None    Exposed Structures None    WOUND NURSE ONLY - Time Spent with Patient (mins) 60      Vascular:    SHAYAN:   No results found.    Lab Values:    Lab Results   Component Value Date/Time    WBC 9.7 03/30/2021 06:27 AM    RBC 5.25 03/30/2021 06:27 AM    HEMOGLOBIN 14.9 03/30/2021 06:27 AM    HEMATOCRIT 47.2 03/30/2021 06:27 AM    CREACTPROT 7.52 (H) 11/29/2020 01:25 AM    SEDRATEWES 13 10/23/2018 08:02 AM    HBA1C 5.2 02/14/2021 01:30 AM      Culture Results show:  No results found for this or any previous visit (from the past 720 hour(s)).    Pain Level/Medicated:  Denies       INTERVENTIONS BY WOUND TEAM:  Chart and images reviewed. Discussed with bedside RN. All areas of concern (based on picture review, LDA review and discussion with bedside RN) have been thoroughly assessed. Documentation of areas based on significant findings. This RN in to assess patient. Performed standard wound care which includes appropriate positioning, dressing removal and non-selective debridement. Pictures and measurements obtained weekly if/when required.       RIGHT POSTERIOR THIGH: Evolved to stage 2  Preparation for Dressing removal: NA  Cleansed with:  wound cleanser and gauze  Sharp debridement: NA  Joya wound: no sting skin prep  Primary Dressing: Mepilex         ABD:  Preparation for Dressing removal: Xeroform and hydrofera blue dressing removed without difficulty.   Cleansed with:  wound cleanser and gauze  Sharp debridement: NA  Joya wound: no sting skin prep  Primary Dressing: ignacio (collagen),  hydrofera blue,   Secondary (Outer) Dressing:  Secured with hypafix tape    Interdisciplinary consultation: Patient, Bedside RN, Alexy, Wound RN ([place])    EVALUATION / RATIONALE FOR TREATMENT:  Most Recent Date:  4/7/21: Thigh wound nearly healed and will likely be resolved at next assessment. Abdomen wound appears larger. Continued with Arti to move through inflammatory phase. Hydrofera blue for its bacteriostatic properties and to assist in drainage management.     03/31/21: thigh wound healing well now to a stage 2.  Continue current plan.  All interventions in place.   ABD more denuded and bloody.  Arti to assist in moving wound through inflammatory phase.   3/23/21: Right posterior thigh wound remains a DTI but appears to be evolving into a stage 2. Wound team was previously consulted regarding sacral wound which appeared to be a skin tear however skin now appears to be intact however discolored. Does not appear to be pressure related as pt is on a waffle, self mobile and sacral mepilex is in use. Area is also not a normal pressure injury shape. Wound team will continue to follow.   3/18: Pt with linear DTIs to R posterior thigh from lying on top of frye cath tubing. Offloading precautions ordered. Wound team to continue following.     Goals: Steady decrease in wound area and depth weekly.    WOUND TEAM PLAN OF CARE ([X] for frequency of wound follow up,):   Nursing to follow orders written for wound care. Contact wound team if area fails to progress, deteriorates or with any questions/concerns  Dressing changes by wound team:                   Follow up 3 times weekly:                NPWT change 3 times weekly:     Follow up 1-2 times weekly: X nursing to perform skin care; WT following      Follow up Bi-Monthly:                   Follow up as needed:     Other (explain):     NURSING PLAN OF CARE ORDERS (X):  Dressing changes: See Dressing Care orders:   Skin care: See Skin Care orders: x  RN Prevention  "Protocol: x  Rectal tube care: See Rectal Tube Care orders:   Other orders:    RSKIN:   CURRENTLY IN PLACE (X), APPLIED THIS VISIT (A), ORDERED (O):   Q shift Michael:  X  Q shift pressure point assessments:  X    Surface/Positioning   Pressure redistribution mattress            Low Airloss        X icu bed  Bariatric foam      Bariatric DARYL     Waffle cushion        Waffle Overlay        X  Reposition q 2 hours    O  TAPs Turning system   O  Z Teja Pillow     Offloading/Redistribution   Sacral Mepilex (Silicone dressing)   X  Heel Mepilex (Silicone dressing)         Heel float boots (Prevalon boot)             Float Heels off Bed with Pillows           Respiratory NA  Silicone O2 tubing         Gray Foam Ear protectors     Cannula fixation Device (Tender )          High flow offloading Clip    Elastic head band offloading device      Anchorfast                                                         Trach with Optifoam split foam             Containment/Moisture Prevention     Rectal tube or BMS    Purwick/Condom Cath        Zabala Catheter    Barrier wipes   X      Barrier paste       Antifungal tx      Interdry        Mobilization       Up to chair        Ambulate   X  PT/OT      Nutrition       Dietician        Diabetes Education      PO  X   TF     TPN     NPO   # days     Other        Anticipated discharge plans: TBD- pending psych transfer, may need continued offloading  LTACH:        SNF/Rehab:                  Home Health Care:           Outpatient Wound Center:            Self/Family Care:        Other:                  [facility]   Inpatient Services   Established Ostomy Management/ troubleshooting  HPI: Reviewed  PMH: Reviewed   SH: Reviewed   Reason for Ostomy nurse consult:  Established colostomy     Subjective: \"Yes, I am ready to try, I have been burping it.\"       Objective: appliance intact, sitter at bedside, pt on legal hold.       Colostomy 11/10/20 Transverse LUQ (Active) " "  Wound Image   03/31/21 1600   Stomal Appliance Assessment Intact    Stoma Assessment Clean;Dry;Intact    Stoma Shape Budded Less Than One Inch;Oval    Stoma Size (in) 1.25    Peristomal Assessment Intact    Mucocutaneous Junction Intact    Treatment Appliance Changed;Cleansed with water/washcloth    Peristomal Protectant Paste Ring    Stomal Appliance Paste Ring, 2\";2 1/4\" (57mm) CTF    Output (mL) 150 mL    Output Color Brown    WOUND RN ONLY - Stomal Appliance  2 Piece;Paste Ring, 2\";2 1/4\" (57mm) CTF    Appliance (Pouch) # [id number]    Appliance Brand Daisy    Appliance Supplier [employer]    Secure Start completed Yes    Ostomy Care Resources Provided [facility] Tip Sheet    WOUND NURSE ONLY - Time Spent with Patient (mins) 30         Ostomy Appliance (type and size): 2 1/4\" barrier with pouch and Paste Ring,      Interventions and Education: Patient removed appliance. Using template pt attempted to cut barrier. This RN assisted with cutting barrier. Paste ring was than stretched by patient and applied down to barrier with some assistance while this RN applied no sting to peristomal skin. This RN applied barrier down around stoma. Pt removed paper tabs and adhered with friction. Pt was able to attach pouch with some assistance and then rolled pouch end closed.      Evaluation:   4/7/21:  Pt reports he has been burping pouch and is capable of changing appliance. Pt does have difficulty cutting barrier. May be able to transition to moldable at next change. Pt appears in better spirits today. Ostomy RN to remain available for education.     03/31/21: patient capable of changing ostomy but still having emotional difficulty with it.  Wound team will continue to follow 1-2 times weekly.  Patient not appropriate for surgical reversal at this time.         3/15/21:  Pt being transferred to T8 related to bacteremia and allergic reaction to zosyn overnight. Appliance working appropriately. Skin intact. Will plan to sign off and " be available PRN.     3/11/21: Patient formed own formaflex barrier pouch and paste ring, assisted in cleansing. Feeling better and willing to continue to get more hands on. Likes the formaflex because he is actually able to form it to his size. Still has not performed independently.      3/8/21: Minimal participation, he attempts to do the steps with his eyes closed and minimal effort with his hands. Not sure if he lacks the fine motor movement or just going through the motions due to his expressed hatred of the colostomy. Patient has yet to perform all steps independently.     3/5/21: Pt did not participate in education today. Pt answered questions appropriately, but remained with his eyes closed through the duration of ostomy care. Declined hands on education when offered. Reported he was not feeling good. Formaflex barrier brought in today for pt as these do no require cutting with scissors. Extra barrier left in bag. CT of abd obtained yesterday did not reveal obstruction.     3/1/21: Viable stoma, producing stool. Per CNA report at bedside, appliance was leaking earlier and CNA changed appliance. Patient observed all steps today and participated with hands on care and did well. Discussed with MD on possibility for reversal. Per surgery, reversal of ostomy is an outpatient procedure that he will have to follow-up with in the future after discharge     2/26/21: Pt very down today, reports that psych has not seen him. Pt kept turning head away from ostomy today but did verbalized he will try to do hands on Sunday.      2/24/21: Pt unable to transfer to inpatient psych until he can care for colostomy independently. Ostomy RNs were asked to return for education. Pt extremely depressed today. Pt states he feels like an animal and that the colostomy is messy and smelly and he would rather die then care for it. This RN provided understanding and attempted to explain that colostomy is manageable. 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 Pt had colostomy created on November 10, 2020 by Dr. Desouza. May want to consider contacting surgery for possible reversal related to pts extreme body dysmorphia (this is pts 2nd admit for SI related to colostomy) and inability for pt to DC due to pts inability/unwillingness to care for colostomy - Discussed with Dr. Ley ([facility] Resident).      Plan: Wound team to continue with teaching every 3-4 days to help teach patient how to care for appliance so that he can discharge to psych.     Anticipated discharge needs: patient would benefit from SNF, outpatient clinic or Home health for follow up care. Patient needs to follow up with Dr. Desouza after discharge to plan for ostomy reversal surgery.

## 2021-04-07 NOTE — CARE PLAN
Problem: Safety  Goal: Will remain free from falls  Outcome: PROGRESSING AS EXPECTED  Note: Patient will remain free from falls throughout shift.     Problem: Potential for Self Harm  Goal: No active self-harm  Outcome: PROGRESSING AS EXPECTED  Note: Patient will refrain from self-harm throughout shift.     Problem: Ineffective Coping  Goal: No active self-harm  Outcome: PROGRESSING AS EXPECTED  Note: Patient will refrain from self-harm throughout shift.

## 2021-04-08 LAB
GLUCOSE BLD-MCNC: 108 MG/DL (ref 65–99)
GLUCOSE BLD-MCNC: 112 MG/DL (ref 65–99)
GLUCOSE BLD-MCNC: 124 MG/DL (ref 65–99)
GLUCOSE BLD-MCNC: 130 MG/DL (ref 65–99)
GLUCOSE BLD-MCNC: 150 MG/DL (ref 65–99)

## 2021-04-08 PROCEDURE — 700102 HCHG RX REV CODE 250 W/ 637 OVERRIDE(OP): Performed by: NURSE PRACTITIONER

## 2021-04-08 PROCEDURE — A9270 NON-COVERED ITEM OR SERVICE: HCPCS | Performed by: STUDENT IN AN ORGANIZED HEALTH CARE EDUCATION/TRAINING PROGRAM

## 2021-04-08 PROCEDURE — 700102 HCHG RX REV CODE 250 W/ 637 OVERRIDE(OP): Performed by: HOSPITALIST

## 2021-04-08 PROCEDURE — 700102 HCHG RX REV CODE 250 W/ 637 OVERRIDE(OP): Performed by: STUDENT IN AN ORGANIZED HEALTH CARE EDUCATION/TRAINING PROGRAM

## 2021-04-08 PROCEDURE — 82962 GLUCOSE BLOOD TEST: CPT | Mod: 91

## 2021-04-08 PROCEDURE — A9270 NON-COVERED ITEM OR SERVICE: HCPCS | Performed by: HOSPITALIST

## 2021-04-08 PROCEDURE — A9270 NON-COVERED ITEM OR SERVICE: HCPCS | Performed by: FAMILY MEDICINE

## 2021-04-08 PROCEDURE — 770001 HCHG ROOM/CARE - MED/SURG/GYN PRIV*

## 2021-04-08 PROCEDURE — A9270 NON-COVERED ITEM OR SERVICE: HCPCS | Performed by: NURSE PRACTITIONER

## 2021-04-08 PROCEDURE — 700102 HCHG RX REV CODE 250 W/ 637 OVERRIDE(OP): Performed by: FAMILY MEDICINE

## 2021-04-08 RX ADMIN — HYDROCORTISONE 5 MG: 10 TABLET ORAL at 16:15

## 2021-04-08 RX ADMIN — FLUDROCORTISONE ACETATE 0.1 MG: 0.1 TABLET ORAL at 06:32

## 2021-04-08 RX ADMIN — HYDROCORTISONE 10 MG: 10 TABLET ORAL at 06:33

## 2021-04-08 RX ADMIN — CLONAZEPAM 1 MG: 1 TABLET ORAL at 17:06

## 2021-04-08 RX ADMIN — APIXABAN 5 MG: 5 TABLET, FILM COATED ORAL at 07:45

## 2021-04-08 RX ADMIN — PAROXETINE HYDROCHLORIDE 20 MG: 20 TABLET, FILM COATED ORAL at 07:45

## 2021-04-08 RX ADMIN — POTASSIUM CHLORIDE 20 MEQ: 20 TABLET, EXTENDED RELEASE ORAL at 06:32

## 2021-04-08 RX ADMIN — LEVOTHYROXINE SODIUM 112 MCG: 0.11 TABLET ORAL at 06:32

## 2021-04-08 RX ADMIN — MIDODRINE HYDROCHLORIDE 5 MG: 5 TABLET ORAL at 11:56

## 2021-04-08 RX ADMIN — DOCUSATE SODIUM 50 MG AND SENNOSIDES 8.6 MG 2 TABLET: 8.6; 5 TABLET, FILM COATED ORAL at 20:01

## 2021-04-08 RX ADMIN — ATORVASTATIN CALCIUM 40 MG: 40 TABLET, FILM COATED ORAL at 17:05

## 2021-04-08 RX ADMIN — INSULIN GLARGINE 5 UNITS: 100 INJECTION, SOLUTION SUBCUTANEOUS at 06:35

## 2021-04-08 RX ADMIN — SIMETHICONE 80 MG: 80 TABLET, CHEWABLE ORAL at 17:06

## 2021-04-08 RX ADMIN — ZIPRASIDONE HYDROCHLORIDE 60 MG: 60 CAPSULE ORAL at 09:31

## 2021-04-08 RX ADMIN — SIMETHICONE 80 MG: 80 TABLET, CHEWABLE ORAL at 12:00

## 2021-04-08 RX ADMIN — MIDODRINE HYDROCHLORIDE 5 MG: 5 TABLET ORAL at 17:05

## 2021-04-08 RX ADMIN — METOPROLOL SUCCINATE 25 MG: 25 TABLET, EXTENDED RELEASE ORAL at 06:32

## 2021-04-08 RX ADMIN — APIXABAN 5 MG: 5 TABLET, FILM COATED ORAL at 20:00

## 2021-04-08 RX ADMIN — ZIPRASIDONE HYDROCHLORIDE 60 MG: 60 CAPSULE ORAL at 20:00

## 2021-04-08 RX ADMIN — SIMETHICONE 80 MG: 80 TABLET, CHEWABLE ORAL at 06:35

## 2021-04-08 RX ADMIN — DIGOXIN 125 MCG: 125 TABLET ORAL at 17:06

## 2021-04-08 RX ADMIN — OMEPRAZOLE 20 MG: 20 CAPSULE, DELAYED RELEASE ORAL at 06:32

## 2021-04-08 RX ADMIN — MIDODRINE HYDROCHLORIDE 5 MG: 5 TABLET ORAL at 07:45

## 2021-04-08 RX ADMIN — CLONAZEPAM 0.5 MG: 0.5 TABLET ORAL at 07:45

## 2021-04-08 NOTE — PROGRESS NOTES
Assumed care of patient this am; AOx4. Patient denies SOB and pain at this time. Patient endorses suicidal ideation. 1:1 sitter at bedside; suicide precautions in place. Will continue to monitor.

## 2021-04-08 NOTE — CARE PLAN
Care Plan  Problem: Communication  Goal: The ability to communicate needs accurately and effectively will improve  Outcome: PROGRESSING AS EXPECTED     Problem: Safety  Goal: Will remain free from falls  Outcome: PROGRESSING AS EXPECTED     Problem: Skin Integrity  Goal: Risk for impaired skin integrity will decrease  Outcome: PROGRESSING AS EXPECTED  Aox4, ast x1, room air. Pt calls appropriately. 1:1 Sitter in place for SI. Abd wound, buttocks, and colostomy appliance/dressing changed today- wound care team. Urinal voiding.

## 2021-04-09 LAB
GLUCOSE BLD-MCNC: 124 MG/DL (ref 65–99)
GLUCOSE BLD-MCNC: 127 MG/DL (ref 65–99)
GLUCOSE BLD-MCNC: 129 MG/DL (ref 65–99)
GLUCOSE BLD-MCNC: 139 MG/DL (ref 65–99)

## 2021-04-09 PROCEDURE — A9270 NON-COVERED ITEM OR SERVICE: HCPCS | Performed by: STUDENT IN AN ORGANIZED HEALTH CARE EDUCATION/TRAINING PROGRAM

## 2021-04-09 PROCEDURE — 700102 HCHG RX REV CODE 250 W/ 637 OVERRIDE(OP): Performed by: NURSE PRACTITIONER

## 2021-04-09 PROCEDURE — 700102 HCHG RX REV CODE 250 W/ 637 OVERRIDE(OP): Performed by: STUDENT IN AN ORGANIZED HEALTH CARE EDUCATION/TRAINING PROGRAM

## 2021-04-09 PROCEDURE — 90834 PSYTX W PT 45 MINUTES: CPT | Performed by: PSYCHOLOGIST

## 2021-04-09 PROCEDURE — 99231 SBSQ HOSP IP/OBS SF/LOW 25: CPT | Performed by: NURSE PRACTITIONER

## 2021-04-09 PROCEDURE — 700102 HCHG RX REV CODE 250 W/ 637 OVERRIDE(OP): Performed by: FAMILY MEDICINE

## 2021-04-09 PROCEDURE — 770001 HCHG ROOM/CARE - MED/SURG/GYN PRIV*

## 2021-04-09 PROCEDURE — 700102 HCHG RX REV CODE 250 W/ 637 OVERRIDE(OP): Performed by: HOSPITALIST

## 2021-04-09 PROCEDURE — A9270 NON-COVERED ITEM OR SERVICE: HCPCS | Performed by: FAMILY MEDICINE

## 2021-04-09 PROCEDURE — A9270 NON-COVERED ITEM OR SERVICE: HCPCS | Performed by: NURSE PRACTITIONER

## 2021-04-09 PROCEDURE — 82962 GLUCOSE BLOOD TEST: CPT | Mod: 91

## 2021-04-09 PROCEDURE — A9270 NON-COVERED ITEM OR SERVICE: HCPCS | Performed by: HOSPITALIST

## 2021-04-09 RX ADMIN — ATORVASTATIN CALCIUM 40 MG: 40 TABLET, FILM COATED ORAL at 17:48

## 2021-04-09 RX ADMIN — OMEPRAZOLE 20 MG: 20 CAPSULE, DELAYED RELEASE ORAL at 05:59

## 2021-04-09 RX ADMIN — DIGOXIN 125 MCG: 125 TABLET ORAL at 17:56

## 2021-04-09 RX ADMIN — METOPROLOL SUCCINATE 25 MG: 25 TABLET, EXTENDED RELEASE ORAL at 05:59

## 2021-04-09 RX ADMIN — INSULIN GLARGINE 5 UNITS: 100 INJECTION, SOLUTION SUBCUTANEOUS at 06:00

## 2021-04-09 RX ADMIN — APIXABAN 5 MG: 5 TABLET, FILM COATED ORAL at 20:36

## 2021-04-09 RX ADMIN — ZIPRASIDONE HYDROCHLORIDE 60 MG: 60 CAPSULE ORAL at 08:12

## 2021-04-09 RX ADMIN — MIDODRINE HYDROCHLORIDE 5 MG: 5 TABLET ORAL at 05:58

## 2021-04-09 RX ADMIN — LEVOTHYROXINE SODIUM 112 MCG: 0.11 TABLET ORAL at 05:59

## 2021-04-09 RX ADMIN — HYDROCORTISONE 10 MG: 10 TABLET ORAL at 06:00

## 2021-04-09 RX ADMIN — SIMETHICONE 80 MG: 80 TABLET, CHEWABLE ORAL at 12:28

## 2021-04-09 RX ADMIN — FLUDROCORTISONE ACETATE 0.1 MG: 0.1 TABLET ORAL at 05:59

## 2021-04-09 RX ADMIN — MIDODRINE HYDROCHLORIDE 5 MG: 5 TABLET ORAL at 12:28

## 2021-04-09 RX ADMIN — APIXABAN 5 MG: 5 TABLET, FILM COATED ORAL at 08:13

## 2021-04-09 RX ADMIN — ZIPRASIDONE HYDROCHLORIDE 60 MG: 60 CAPSULE ORAL at 20:36

## 2021-04-09 RX ADMIN — CLONAZEPAM 0.5 MG: 0.5 TABLET ORAL at 08:13

## 2021-04-09 RX ADMIN — SIMETHICONE 80 MG: 80 TABLET, CHEWABLE ORAL at 06:00

## 2021-04-09 RX ADMIN — CLONAZEPAM 1 MG: 1 TABLET ORAL at 17:48

## 2021-04-09 RX ADMIN — POTASSIUM CHLORIDE 20 MEQ: 20 TABLET, EXTENDED RELEASE ORAL at 05:59

## 2021-04-09 RX ADMIN — SIMETHICONE 80 MG: 80 TABLET, CHEWABLE ORAL at 17:48

## 2021-04-09 RX ADMIN — MIDODRINE HYDROCHLORIDE 5 MG: 5 TABLET ORAL at 17:48

## 2021-04-09 RX ADMIN — DOCUSATE SODIUM 50 MG AND SENNOSIDES 8.6 MG 2 TABLET: 8.6; 5 TABLET, FILM COATED ORAL at 20:36

## 2021-04-09 RX ADMIN — HYDROCORTISONE 5 MG: 10 TABLET ORAL at 15:54

## 2021-04-09 RX ADMIN — PAROXETINE HYDROCHLORIDE 20 MG: 20 TABLET, FILM COATED ORAL at 08:13

## 2021-04-09 ASSESSMENT — ENCOUNTER SYMPTOMS
COUGH: 0
SHORTNESS OF BREATH: 0
NAUSEA: 0
PALPITATIONS: 0
CONSTIPATION: 0
DEPRESSION: 1
DIARRHEA: 0
INSOMNIA: 0
DIZZINESS: 0
FEVER: 0
NERVOUS/ANXIOUS: 0
HEADACHES: 0
FOCAL WEAKNESS: 0
VOMITING: 0
WEAKNESS: 0
SENSORY CHANGE: 0
ABDOMINAL PAIN: 0
SPEECH CHANGE: 0

## 2021-04-09 NOTE — PROGRESS NOTES
Hospital Medicine Twice Weekly Progress Note    Date of Service  4/9/2021    Chief Complaint  Suicidal ideation    Hospital Course  Mr. aCstro is a 56 year-old man with a past medical history significant for atrial fibrillation, secondary hypercoagulable state, diabetes mellitus complicated by diabetic neuropathy, chronic systolic CHF, colostomy placement, orthostatic hypotension (maintained on florinef), and dyslipidemia who presented to the ED on 2/9/2021 after he became dizzy and had a syncopal episode at home.  In the ED he was noted to be hypotensive and bradycardic. Cardiology was consulted and recommended stopping all AV sanket blocking medications.  He remained orthostatic during his hospitalization so midodrine was started.  An echocardiogram was obtained and showed mild mitral regurgitation and an EF of 60% which was improved from his previous echocardiogram obtained on 11/2020 (previously 35%).  CTAs of the head and neck were done and were unremarkable outside of distal bilateral ICA tortuosity.  Also during his hospitalization he expressed desire to kill himself.  He has a colostomy and has become overwhelmed by caring for it.  He was subsequently placed on a legal hold and psychiatry was consulted.  He is now pending [facility] placement at the 6-month db for maturity of his colostomy.    Interval Problem Update  -Sleeping but easily arousable. Sitter at the bedside. Feeling a little better but still with SI. No dizziness today. No urinary retention after discontinuing flomax.     Consultants/Specialty  Cardiology  Psychiatry  General surgery    Code Status  Full Code    Disposition  Pending [facility] placement, likely to happen in May at the maturation of his colostomy.    Review of Systems  Review of Systems   Constitutional: Negative for fever and malaise/fatigue.   Respiratory: Negative for cough and shortness of breath.    Cardiovascular: Positive for leg swelling. Negative for chest pain and  palpitations.   Gastrointestinal: Negative for abdominal pain, constipation, diarrhea (Liquid stool present within his colostomy), nausea and vomiting.   Genitourinary: Negative for dysuria, frequency and urgency.        No urinary retention   Neurological: Negative for dizziness, sensory change, speech change, focal weakness, weakness and headaches.   Psychiatric/Behavioral: Positive for depression and suicidal ideas. The patient is not nervous/anxious and does not have insomnia.    All other systems reviewed and are negative.     Physical Exam  Temp:  [36.1 °C (97 °F)-36.3 °C (97.4 °F)] 36.3 °C (97.4 °F)  Pulse:  [63-78] 64  Resp:  [19-20] 19  BP: (101-129)/(54-79) 119/79  SpO2:  [92 %-96 %] 92 %    Physical Exam  Vitals and nursing note reviewed.   Constitutional:       General: He is awake. He is not in acute distress.     Appearance: Normal appearance. He is well-developed. He is not ill-appearing.   HENT:      Head: Normocephalic and atraumatic.      Mouth/Throat:      Lips: Pink.      Mouth: Mucous membranes are moist.   Eyes:      Conjunctiva/sclera: Conjunctivae normal.      Pupils: Pupils are equal, round, and reactive to light.   Cardiovascular:      Rate and Rhythm: Normal rate. Rhythm irregularly irregular.      Pulses: Normal pulses.      Heart sounds: Normal heart sounds.   Pulmonary:      Effort: Pulmonary effort is normal.      Breath sounds: Normal breath sounds.   Abdominal:      General: Bowel sounds are normal. There is no distension or abdominal bruit.      Palpations: Abdomen is soft.      Tenderness: There is no abdominal tenderness.       Musculoskeletal:      Cervical back: Normal range of motion and neck supple.      Right lower leg: Edema (stable today) present.      Left lower leg: Edema (stable today) present.   Skin:     General: Skin is warm and dry.      Coloration: Skin is pale.   Neurological:      General: No focal deficit present.      Mental Status: He is alert.   Psychiatric:          Attention and Perception: Attention and perception normal.         Mood and Affect: Affect normal. Mood is depressed.         Speech: Speech normal.         Behavior: Behavior normal. Behavior is cooperative.         Thought Content: Thought content includes suicidal ideation. Thought content includes suicidal plan.         Cognition and Memory: Memory normal.     Fluids    Intake/Output Summary (Last 24 hours) at 4/9/2021 1453  Last data filed at 4/9/2021 0900  Gross per 24 hour   Intake no documentation   Output 650 ml   Net -650 ml     Laboratory      Recent Labs     04/06/21  1611 04/07/21  1500   SODIUM 137 136   POTASSIUM 4.4 4.4   CHLORIDE 101 101   CO2 29 26   GLUCOSE 106* 121*   BUN 27* 23*   CREATININE 1.14 1.02   CALCIUM 9.5 9.5     Imaging  [id number] VIEW   Final Result         No significant interval change.      DX-CHEST-LIMITED (1 VIEW)   Final Result         Diffuse interstitial prominence could relate to mild pulmonary edema or atypical infection      VA-FSEPUMG-2 VIEW   Final Result      Increased colonic gas without definite bowel obstruction.      US-RENAL   Final Result      No evidence of hydronephrosis.      Lobulated kidneys bilaterally.      CT-ABDOMEN-PELVIS WITH   Final Result      1.  There is no evidence of small bowel obstruction.   2.  There is a left lower quadrant ostomy.   3.  There is fluid distention of the colon distal to the surgical material in the left mid descending colon extending all the way to the rectum. There is no pneumatosis or free air.   4.  There is cholelithiasis without biliary dilatation.   5.  There has been interval removal of the drainage catheter anterior to the spleen with minimal hypodense area in the anterior spleen again noted. There is no new fluid collection.      IR-US GUIDED PIV   Final Result    Ultrasound-guided PERIPHERAL IV INSERTION performed by    qualified nursing staff as above.      EC-ECHOCARDIOGRAM COMPLETE W/O CONT   Final Result       DX-LUMBAR SPINE-2 OR 3 VIEWS   Final Result      Moderate compression deformity of T11 is new compared to 2018.      Mild wedge deformity of T12 is unchanged.      Degenerative changes including facet arthropathy.      Mild retrolisthesis of L5 on S1 and L3 on L4.            Assessment/Plan  * Suicidal ideation- (present on admission)  Assessment & Plan  -Has history of MDD, recurrent with psychotic features and active SI w/ plan.  -Currently on legal hold.  -Psychiatry following, appreciate assistance.  Medication per their discretion.  -Pending placement to [facility].   -Continue suicide precautions and sitter at bedside.    Orthostatic hypotension- (present on admission)  Assessment & Plan  -Multifactorial including adrenal insufficiency, medications (beta blocker, CCB, tamsulosin), diabetic autonomic dysfunction, and venous insufficiency.  -EF is noted to be 60%, significant improvement from prior (previously 35%).  -Per cardiology, no further cardiac work-up needed.  Recommends follow-up with them as outpatient.  -Continue midodrine.  -Fall precautions.  -Continue to educate on the importance of slow positional changes.  -Started PO hydrocortisone 4/7/2021 for AI. Continuing florinef. Recheck orthostatics periodically. Will obtain new numbers today.     Paroxysmal atrial fibrillation (HCC)- (present on admission)  Assessment & Plan  -CHADSVASC 3 (HTN, CHF, DM).   -TTE Feb 2021: EF 60%.  -Continue metoprolol, digoxin, and eliquis.    Chronic heart failure with preserved ejection fraction (HCC)- (present on admission)  Assessment & Plan  -Now stable.  -Pulmonary edema noted in mid-March, was placed on IV diuresis though that was held when he developed a gram-negative gina bacteremia and sepsis.  Currently doing well from a respiratory standpoint, no shortness of breath, lungs clear to auscultation bilaterally, not requiring oxygen supplementation.  -Repeat echocardiogram showed improvement in his ejection  fraction to 60%.    -Continue metoprolol succinate.  -Lisinopril held per cardiology recommendations due to hypotension.    Diabetes mellitus type 2, insulin dependent (HCC)- (present on admission)  Assessment & Plan  -A1c 5.2% on 2/14/2021.  -Continue diabetic diet.  -Blood sugars are well controlled on current regimen.  Continue lantus at current dose with SSI as required.  -Hypoglycemia protocol in place if needed.    Major depressive disorder, recurrent, severe with psychotic features (HCC)- (present on admission)  Assessment & Plan  -Management as listed under 'anxiety and depression'.    Anxiety and depression- (present on admission)  Assessment & Plan  -Legal hold extended, awaiting acceptance at psych facility ([facility] will take in May).  -Remains suicidal.   -Psychiatry following.  -Continue paxil, klonopin, & geodon.     Hypothyroidism- (present on admission)  Assessment & Plan  -TFTs in Feb were WNL. Continue levothyroxine at current dose.     Adrenal insufficiency (HCC)- (present on admission)  Assessment & Plan  -Started PO hydrocortisone 4/7/2021. Continue florinef.   -Needs outpatient endocrinology follow up.  -Monitor potassium while on florinef. 4.4 on 4/7/2021.  -Repeat orthostatics periodically.     Obstructive uropathy- (present on admission)  Assessment & Plan  -Trial off of flomax given his hypotension.  Discontinued 4/6/2021. No retention thus far.     Chronic venous insufficiency of lower extremity- (present on admission)  Assessment & Plan  -Continue compression stockings.    Colostomy present (HCC)- (present on admission)  Assessment & Plan  -History of fall at home Nov 2020 on left side with perforated colon and splenic fluid collection/hematoma s/p segmental colectomy, colostomy, mobilization of the splenic flexure, wound vac, and I&D Nov 2020.  -Wound care consulted for ostomy care, but he was non-participatory. He is now engaging in ostomy care with his bedside RNs, encouraged to be  observant but do it independently.  -Patient must be independently able to care for his ostomy before inpatient psychiatry will accept him.  Per surgery Dr. Desouza, high risk of ostomy reversal given his complexities. Plan for close multidisciplinary outpatient follow-up for surgery when patients overall medical conditions are more optimized.    Debility- (present on admission)  Assessment & Plan  -Cleared by PT/OT.  -Out of bed for each meal and ambulate in the hallways at least twice per shift.    Mixed hyperlipidemia- (present on admission)  Assessment & Plan  Lab Results   Component Value Date/Time    CHOLSTRLTOT 146 02/14/2021 01:30 AM    LDL 82 02/14/2021 01:30 AM    HDL 32 (A) 02/14/2021 01:30 AM    TRIGLYCERIDE 160 (H) 02/14/2021 01:30 AM   -Continue atorvastatin.     Lower limb amputation, great toe (HCC)- (present on admission)  Assessment & Plan  -History of, 2018.  Site well-healed.    Class 2 severe obesity with serious comorbidity and body mass index (BMI) of 35.0 to 35.9 in adult (HCC)- (present on admission)  Assessment & Plan  Body mass index is 35.19 kg/m².-Trending down.  -Could benefit from outpatient weight loss counseling which can be arranged through his PCP after hospital discharge.       VTE prophylaxis: Eliquis

## 2021-04-09 NOTE — CARE PLAN
Problem: Safety  Goal: Will remain free from injury  Outcome: PROGRESSING AS EXPECTED      Note: Patient will not attempt self-harm over course of shift    Problem: Infection  Goal: Will remain free from infection  Outcome: PROGRESSING AS EXPECTED  Note: Patient will not experience SARITA during hospitalization     Problem: Mobility  Goal: Risk for activity intolerance will decrease  Outcome: PROGRESSING AS EXPECTED  Note: Patient will ambulate at least three times this shift     Problem: Safety  Goal: Will remain free from falls  Note: Patient will not attempt self-harm over course of shift

## 2021-04-09 NOTE — CARE PLAN
Problem: Communication  Goal: The ability to communicate needs accurately and effectively will improve  Outcome: PROGRESSING AS EXPECTED  Note: The patient will communicate needs with staff this shift      Problem: Mobility  Goal: Risk for activity intolerance will decrease  Outcome: PROGRESSING AS EXPECTED  Note: Patient will maintain ability to ambulate without experiencing fatigue     Problem: Safety  Goal: Will remain free from falls  Note: Patient will not experience fall during this shift      Problem: Skin Integrity  Goal: Risk for impaired skin integrity will decrease  Note: Patient will not experience deterioration of skin during hospital stay

## 2021-04-09 NOTE — PROGRESS NOTES
Assumed care of patient this am, AOx4. Patient denies SOB and pain at this time. 1:1 sitter at bedside and suicide precautions in place. Bed low and locked. Will continue to monitor.

## 2021-04-09 NOTE — NON-PROVIDER
Hospital Medicine Daily Progress Note     Date of Service  4/9/2021     Chief Complaint  Syncope and suicide ideation     Hospital Course  56 year old male with past medical history of atrial fibrillation, secondary hypercoagulable state, diabetes mellitus, diabetic neuropathy, CHFpEF, orthostatic hypotension,  and dyslipidemia admitted 2/9/21 after a syncopal event. Patient had complaint of significant dizziness upon standing. Patient was hypotensive and bradycardic in the ER. Cardiology was consulted and AB sanket blockers with discontinued per their recommendations.      Patient had a colostomy and Interiano's pouch in November 2020 following a splenic flexure perforation. The patient was caring for his colostomy at home, however, he has become depressed and overwhelmed. He states that he can no longer care for his colostomy and is suicidal. The patient remains on a legal hold with psychiatry following.      Interval Problem Update  Patient states he is feeling a little better but still wants to hurt himself. He stated that he has been eating and sleeping well. He also says that he has been walking in the [place] without dizziness. Patient also denies any problems with urinary retention. Plan of care discussed with the patient and he agreed. Patient safety advocate remains at bedside.      Consultants/Specialty  Psychiatry  Cardiology-signed off, reconsult as needed   General Surgery-signed off     Code Status  Full Code     Disposition  Awaiting psych facility acceptance.  On [facility] waiting list with reported acceptance with ostomy maturity (May).     Review of Systems   Constitutional:  Expresses improved sleep and appetite. Denies fever or chills.   Respiratory: Denies shortness of breath, cough, or sputum production  Cardiovascular:  Denies chest pain or palpitations. States improvement in dizziness upon standing.   Gastrointestinal:   Denies abdominal pain, N/V/D.  Genitourinary:  Denies dysuria, urinary  frequency, or retention.   Neurological: Denies headache, focal deficits, or changes in mentation.   Psychiatric/Behavioral: States continual depression and desire to kill himself.   All other systems reviewed and are negative.     Physical Exam  /79   Pulse 64   Temp 36.3 °C (97.4 °F) (Temporal)   Resp 19   Ht 1.829 m (6')   Wt 118 kg (259 lb 8 oz)   SpO2 92%   BMI 35.19 kg/m²     Physical Exam  Vitals and nursing note reviewed.   Constitutional:  Alert and cooperative. No acute distress noted.        HENT: Normocephalic, mucous membranes moist, trachea midline.        Eyes: PERRLA      Cardiovascular: Normal rate and rhythm, no murmur, rub or gallop. Pulses palpable.        Pulmonary: Normal respiratory effort. Clear throughout to auscultation        Abdominal: Soft and flat. Normoactive bowel sounds. Ostomy stoma pink, CDI, with soft brown stool.        Musculoskeletal:  Normal ROM. No joint swelling or deformity.      Skin:color appropriate for ethnicity. Warm, clean, dry, and intact.       Neurological: Cranial nerves grossly intact. AOx4. Strength 4/5       Psychiatric:  Calm and cooperative. States wish to harm himself Suicidal           Fluids  No intake or output data in the 24 hours ending 03/12/21 1405     Laboratory  No results for input(s): WBC, RBC, HEMOGLOBIN, HEMATOCRIT, MCV, MCH, RDW, PLATELETCT, MPV, NEUTSPOLYS, LYMPHOCYTES, MONOCYTES, EOSINOPHILS, BASOPHILS, RBCMORPHOLO in the last 72 hours.    Lab Results   Component Value Date/Time    SODIUM 136 04/07/2021 03:00 PM    POTASSIUM 4.4 04/07/2021 03:00 PM    CHLORIDE 101 04/07/2021 03:00 PM    CO2 26 04/07/2021 03:00 PM    GLUCOSE 121 (H) 04/07/2021 03:00 PM    BUN 23 (H) 04/07/2021 03:00 PM    CREATININE 1.02 04/07/2021 03:00 PM        Imaging  No new imaging    Assessment/Plan  Suicidal Ideation              -Has history of MDD; recurrent with psychotic features and active plan for suicide.              -Psychiatry following               -Continue psychiatric medications              -On legal hold with safety advocate at bedside.               -Awaiting acceptance to psychiatric program; [facility] will accept patient in May.   Major depressive disorder              -psychiatry/psychology following              -continue paroxetine, ziprasidone, and clonazepam  Adrenal insufficiency              -Continue fludrocortisone              -Continue hydrocortisone              -Will need endocrinology follow up as outpatient  Syncope secondary to Orthostatic hypotension              -Multifactorial including adrenal insufficiency, medications, venous insufficiency, and diabetic autonomic dysfunction              -continue digoxin;  improved EF to 60%.              -Allow permissive hypertension   -continue steroids as listed above  History of Paroxysmal atrial fibrillation              -Continue digoxin and eliquis  CHFpEF              -EF 60% on last echo              -Holding metoprolol and lisinopril due to orthostatic hypotension  Diabetes mellitus type 2, insulin dependent- controlled              -A1c 5.2              -Diabetic diet              -Continue lantus and SSI  Hypothyroidism              -Continue levothyroxine  CKD stage 3              -follow intermittently  Colostomy present              -s/p segmental Interiano's procedure and colostomy following a splenic flexure perforation in Nov. 2020              -High risk of complications with ostomy reversal due to current medical complexities; Surgery with follow-up outpatient when medical conditions optimized.               -patient needing encouragement with ostomy care; non-participatory              -will need to be independent with ostomy care prior to accepantance to a psych facility  Debility              -likely due to depression and orthostatic hypotension              -PT/OT evaluated, no further skills needed              -Uses walker; improved dizziness   Chronic venous  insufficiency of BLE              -compression stockings  Nonischemic cardiomyopathy              -No chest pain              -Normal coronaries 2018  Dyslipidemia              -Continue atorvastatin  Medically Significant Excessive Weight              -BMI 37.79              -Continued diet and exercise counseling         VTE prophylaxis: Epixiban

## 2021-04-09 NOTE — CONSULTS
"PSYCHOLOGICAL FOLLOW-UP:  Reason for admission: Syncope and collapse [R55]  Suicidal ideation [R45.851]  Sepsis due to Klebsiella pneumoniae (HCC) [A41.4]  Length of Visit: 45min    Legal status: Legal Status: Involuntary    Chief Complaint: \"I'm tired of it.\"    HPI: Met with the patient for crisis management, risk assessment, and brief psychotherapy. Patient presented with a depressed affect and reported a \"not good\" mood. He continues to endorse active suicidality and reports that \"the voices\" have become louder. He also continues to report hopelessness. The patient shared that his suicidality worsened after he thought his ostomy was going to be reversed and then was told that it wasn't. Per chart review, this is true. He then met with the court MDs who continued his legal hold and placed him under a 6 month commitment. See chart for more details. The patient was unable to explain to this writer the psychotherapy skills taught in previous sessions including mindfulness, values based self-care, and thought diffusion. It is unclear if this is related to a memory impairment or behavioral process as he was able to retain this information back in December 2020 when working with this writer. Reviewed these skills and discussed what the patient needs to do to \"move forward\" as the patient stated a desire to \"get my head screwed back on.\" Encouraged the patient to perform his own ostomy care and practice mindfulness. Discussed how this writer will be checking on his progress forward at the next session. The patient agreed to practice the skills.     Noteably, the patient began to shake his legs and arm when discussing what he needs to do to \"move forward\" with his psychological recovery. He could not explain to this writer what had sparked the anxiety. However, he was able to recognize that the shaking was related to anxiety.     Per chart review, the patient reported to the team psychiatrist that he attempted suicide by " "slitting his throat over a year ago. He stated it resulted in stiches. However, per chart review, the patient only endorsed a history of trying to slit his wrists in 2018, 2019, and 2020. This is the first time the patient has endorsed a history of slitting his throat. There is no evidence in the medical record here at [facility] of this type of suicide attempt. There are also no records available to this writer from outside facilities indicating a history of this type of suicide attempt.     Psychiatric Examination:  Vitals: Blood pressure 119/79, pulse 64, temperature 36.3 °C (97.4 °F), temperature source Temporal, resp. rate 19, height 1.829 m (6'), weight 118 kg (259 lb 8 oz), SpO2 92 %.  Musculoskeletal: shaking legs and arm noted when anxious. Calm when anxiety was not reported.   Appearance and Eye Contact: appropriate dress and grooming. Behavior is calm, cooperative,  appropriate eye contact  Attention/Alertness: Alert  Thought Process: Linear    Thought Content: No psychotic processes noted  Speech: Clear with normal rate and rhythm  Mood: \"not good\"            Affect: depressed         SI/HI: Endorses    Memory: Recent and remote memory appear possibly impaired. See HPI   Orientation: alert, oriented to person, place and time  Insight into symptoms: poor  Judgement into symptoms:poor    ASSESSMENT: The patient continues to endorse active suicidality and is unable to explain how he will avoid attempting suicide. He also continues to be under a 6 month court commitment. It should be noted that per chart review this is the first time the patient has endorsed a history of attempting suicide by slitting his throat. Previously in 2018, 2019, and 2020, he only endorsed a history of attempting to cut his wrists. It is unclear why there is a discrepancy in the patient's reported history of suicide attempts. See chart for more details. Nevertheless, the patient continues to meet criteria today for a legal hold. It " remains extended.      DSM5 Diagnostic Considerations:   Major Depressive Disorder, recurrent, severe, with psychotic features  Unspecified Anxiety Disorder    PLAN:  Legal status: Involuntary. 6 month court commitment.   1:1 sitter remains  No changes to legal hold restrictions   Recommend continuing to encourage patient care for his ostomy on his own   Recommend cueing patient to use his skills learned in psychotherapy   Anticipate F/U by a qualified mental rebecca professional within 48 hours.   Records reviewed: yes  Discussed patient with other provider: yes, team psychiatrist  Will continue to follow  Thank you for the consult.    Madhuri Fernandez, Ph.D.

## 2021-04-09 NOTE — DISCHARGE PLANNING
Anticipated Discharge Disposition:   Inpatient Psych    Action:   Per chart review, pt is on Legal Hold, pending acceptance to inpatient psych.    Barriers to Discharge:   Inpatient Psych acceptance and bed availability.    Plan:   Follow up with DPA.  Hospital Care Management will continue to follow and assist with discharge planning needs.

## 2021-04-10 LAB
GLUCOSE BLD-MCNC: 113 MG/DL (ref 65–99)
GLUCOSE BLD-MCNC: 114 MG/DL (ref 65–99)
GLUCOSE BLD-MCNC: 126 MG/DL (ref 65–99)
GLUCOSE BLD-MCNC: 126 MG/DL (ref 65–99)

## 2021-04-10 PROCEDURE — A9270 NON-COVERED ITEM OR SERVICE: HCPCS | Performed by: STUDENT IN AN ORGANIZED HEALTH CARE EDUCATION/TRAINING PROGRAM

## 2021-04-10 PROCEDURE — A9270 NON-COVERED ITEM OR SERVICE: HCPCS | Performed by: HOSPITALIST

## 2021-04-10 PROCEDURE — 770001 HCHG ROOM/CARE - MED/SURG/GYN PRIV*

## 2021-04-10 PROCEDURE — 700102 HCHG RX REV CODE 250 W/ 637 OVERRIDE(OP): Performed by: FAMILY MEDICINE

## 2021-04-10 PROCEDURE — 82962 GLUCOSE BLOOD TEST: CPT | Mod: 91

## 2021-04-10 PROCEDURE — A9270 NON-COVERED ITEM OR SERVICE: HCPCS | Performed by: FAMILY MEDICINE

## 2021-04-10 PROCEDURE — 700102 HCHG RX REV CODE 250 W/ 637 OVERRIDE(OP): Performed by: HOSPITALIST

## 2021-04-10 PROCEDURE — 700102 HCHG RX REV CODE 250 W/ 637 OVERRIDE(OP): Performed by: STUDENT IN AN ORGANIZED HEALTH CARE EDUCATION/TRAINING PROGRAM

## 2021-04-10 PROCEDURE — A9270 NON-COVERED ITEM OR SERVICE: HCPCS | Performed by: NURSE PRACTITIONER

## 2021-04-10 PROCEDURE — 700102 HCHG RX REV CODE 250 W/ 637 OVERRIDE(OP): Performed by: NURSE PRACTITIONER

## 2021-04-10 RX ADMIN — HYDROCORTISONE 5 MG: 10 TABLET ORAL at 15:03

## 2021-04-10 RX ADMIN — METOPROLOL SUCCINATE 25 MG: 25 TABLET, EXTENDED RELEASE ORAL at 05:28

## 2021-04-10 RX ADMIN — ZIPRASIDONE HYDROCHLORIDE 60 MG: 60 CAPSULE ORAL at 20:15

## 2021-04-10 RX ADMIN — POTASSIUM CHLORIDE 20 MEQ: 20 TABLET, EXTENDED RELEASE ORAL at 05:29

## 2021-04-10 RX ADMIN — INSULIN GLARGINE 5 UNITS: 100 INJECTION, SOLUTION SUBCUTANEOUS at 05:33

## 2021-04-10 RX ADMIN — SIMETHICONE 80 MG: 80 TABLET, CHEWABLE ORAL at 17:05

## 2021-04-10 RX ADMIN — SIMETHICONE 80 MG: 80 TABLET, CHEWABLE ORAL at 05:29

## 2021-04-10 RX ADMIN — CLONAZEPAM 0.5 MG: 0.5 TABLET ORAL at 08:10

## 2021-04-10 RX ADMIN — DIGOXIN 125 MCG: 125 TABLET ORAL at 17:05

## 2021-04-10 RX ADMIN — LEVOTHYROXINE SODIUM 112 MCG: 0.11 TABLET ORAL at 05:28

## 2021-04-10 RX ADMIN — HYDROCORTISONE 10 MG: 10 TABLET ORAL at 05:29

## 2021-04-10 RX ADMIN — FLUDROCORTISONE ACETATE 0.1 MG: 0.1 TABLET ORAL at 05:29

## 2021-04-10 RX ADMIN — MIDODRINE HYDROCHLORIDE 5 MG: 5 TABLET ORAL at 08:10

## 2021-04-10 RX ADMIN — PAROXETINE HYDROCHLORIDE 20 MG: 20 TABLET, FILM COATED ORAL at 08:10

## 2021-04-10 RX ADMIN — MIDODRINE HYDROCHLORIDE 5 MG: 5 TABLET ORAL at 17:05

## 2021-04-10 RX ADMIN — APIXABAN 5 MG: 5 TABLET, FILM COATED ORAL at 20:16

## 2021-04-10 RX ADMIN — APIXABAN 5 MG: 5 TABLET, FILM COATED ORAL at 08:10

## 2021-04-10 RX ADMIN — CLONAZEPAM 1 MG: 1 TABLET ORAL at 17:05

## 2021-04-10 RX ADMIN — OMEPRAZOLE 20 MG: 20 CAPSULE, DELAYED RELEASE ORAL at 05:28

## 2021-04-10 RX ADMIN — ZIPRASIDONE HYDROCHLORIDE 60 MG: 60 CAPSULE ORAL at 08:10

## 2021-04-10 RX ADMIN — SIMETHICONE 80 MG: 80 TABLET, CHEWABLE ORAL at 11:49

## 2021-04-10 RX ADMIN — ATORVASTATIN CALCIUM 40 MG: 40 TABLET, FILM COATED ORAL at 17:05

## 2021-04-10 RX ADMIN — MIDODRINE HYDROCHLORIDE 5 MG: 5 TABLET ORAL at 11:49

## 2021-04-10 NOTE — CARE PLAN
Problem: Communication  Goal: The ability to communicate needs accurately and effectively will improve  Outcome: PROGRESSING AS EXPECTED  Note: Pt expresses needs appropriately.     Problem: Safety  Goal: Will remain free from injury  Outcome: PROGRESSING AS EXPECTED  Note: Safety sitter remains present as patient endorses current suicidal ideation. Room safety checklist in use. Dangerous objects removed.

## 2021-04-10 NOTE — CARE PLAN
Problem: Safety  Goal: Will remain free from falls  Outcome: PROGRESSING AS EXPECTED  Note: Pt will remain free from falls during hospital stay     Problem: Psychosocial Needs:  Goal: Level of anxiety will decrease  Outcome: PROGRESSING AS EXPECTED  Flowsheets (Taken 4/10/2021 1213)  Patient Behaviors:   Flat Affect   Withdrawn   Anxious  Note: Pt level of anxiety will decrease

## 2021-04-11 LAB
GLUCOSE BLD-MCNC: 108 MG/DL (ref 65–99)
GLUCOSE BLD-MCNC: 108 MG/DL (ref 65–99)
GLUCOSE BLD-MCNC: 128 MG/DL (ref 65–99)
GLUCOSE BLD-MCNC: 128 MG/DL (ref 65–99)

## 2021-04-11 PROCEDURE — 700102 HCHG RX REV CODE 250 W/ 637 OVERRIDE(OP): Performed by: STUDENT IN AN ORGANIZED HEALTH CARE EDUCATION/TRAINING PROGRAM

## 2021-04-11 PROCEDURE — A9270 NON-COVERED ITEM OR SERVICE: HCPCS | Performed by: FAMILY MEDICINE

## 2021-04-11 PROCEDURE — 700102 HCHG RX REV CODE 250 W/ 637 OVERRIDE(OP): Performed by: NURSE PRACTITIONER

## 2021-04-11 PROCEDURE — A9270 NON-COVERED ITEM OR SERVICE: HCPCS | Performed by: STUDENT IN AN ORGANIZED HEALTH CARE EDUCATION/TRAINING PROGRAM

## 2021-04-11 PROCEDURE — 700102 HCHG RX REV CODE 250 W/ 637 OVERRIDE(OP): Performed by: HOSPITALIST

## 2021-04-11 PROCEDURE — 700102 HCHG RX REV CODE 250 W/ 637 OVERRIDE(OP): Performed by: FAMILY MEDICINE

## 2021-04-11 PROCEDURE — 82962 GLUCOSE BLOOD TEST: CPT | Mod: 91

## 2021-04-11 PROCEDURE — A9270 NON-COVERED ITEM OR SERVICE: HCPCS | Performed by: HOSPITALIST

## 2021-04-11 PROCEDURE — 770001 HCHG ROOM/CARE - MED/SURG/GYN PRIV*

## 2021-04-11 PROCEDURE — A9270 NON-COVERED ITEM OR SERVICE: HCPCS | Performed by: NURSE PRACTITIONER

## 2021-04-11 RX ADMIN — APIXABAN 5 MG: 5 TABLET, FILM COATED ORAL at 20:56

## 2021-04-11 RX ADMIN — ATORVASTATIN CALCIUM 40 MG: 40 TABLET, FILM COATED ORAL at 16:51

## 2021-04-11 RX ADMIN — HYDROCORTISONE 10 MG: 10 TABLET ORAL at 05:13

## 2021-04-11 RX ADMIN — INSULIN GLARGINE 5 UNITS: 100 INJECTION, SOLUTION SUBCUTANEOUS at 05:19

## 2021-04-11 RX ADMIN — HYDROCORTISONE 5 MG: 10 TABLET ORAL at 16:50

## 2021-04-11 RX ADMIN — CLONAZEPAM 1 MG: 1 TABLET ORAL at 16:51

## 2021-04-11 RX ADMIN — SIMETHICONE 80 MG: 80 TABLET, CHEWABLE ORAL at 05:13

## 2021-04-11 RX ADMIN — CLONAZEPAM 0.5 MG: 0.5 TABLET ORAL at 08:22

## 2021-04-11 RX ADMIN — DIGOXIN 125 MCG: 125 TABLET ORAL at 16:51

## 2021-04-11 RX ADMIN — APIXABAN 5 MG: 5 TABLET, FILM COATED ORAL at 08:23

## 2021-04-11 RX ADMIN — ZIPRASIDONE HYDROCHLORIDE 60 MG: 60 CAPSULE ORAL at 08:22

## 2021-04-11 RX ADMIN — DOCUSATE SODIUM 50 MG AND SENNOSIDES 8.6 MG 2 TABLET: 8.6; 5 TABLET, FILM COATED ORAL at 20:55

## 2021-04-11 RX ADMIN — OMEPRAZOLE 20 MG: 20 CAPSULE, DELAYED RELEASE ORAL at 05:13

## 2021-04-11 RX ADMIN — FLUDROCORTISONE ACETATE 0.1 MG: 0.1 TABLET ORAL at 05:13

## 2021-04-11 RX ADMIN — MIDODRINE HYDROCHLORIDE 5 MG: 5 TABLET ORAL at 08:23

## 2021-04-11 RX ADMIN — SIMETHICONE 80 MG: 80 TABLET, CHEWABLE ORAL at 16:51

## 2021-04-11 RX ADMIN — POTASSIUM CHLORIDE 20 MEQ: 20 TABLET, EXTENDED RELEASE ORAL at 05:13

## 2021-04-11 RX ADMIN — MIDODRINE HYDROCHLORIDE 5 MG: 5 TABLET ORAL at 16:51

## 2021-04-11 RX ADMIN — PAROXETINE HYDROCHLORIDE 20 MG: 20 TABLET, FILM COATED ORAL at 08:23

## 2021-04-11 RX ADMIN — SIMETHICONE 80 MG: 80 TABLET, CHEWABLE ORAL at 11:18

## 2021-04-11 RX ADMIN — LEVOTHYROXINE SODIUM 112 MCG: 0.11 TABLET ORAL at 05:13

## 2021-04-11 RX ADMIN — DOCUSATE SODIUM 50 MG AND SENNOSIDES 8.6 MG 2 TABLET: 8.6; 5 TABLET, FILM COATED ORAL at 08:22

## 2021-04-11 RX ADMIN — METOPROLOL SUCCINATE 25 MG: 25 TABLET, EXTENDED RELEASE ORAL at 05:13

## 2021-04-11 RX ADMIN — MIDODRINE HYDROCHLORIDE 5 MG: 5 TABLET ORAL at 11:18

## 2021-04-11 RX ADMIN — ZIPRASIDONE HYDROCHLORIDE 60 MG: 60 CAPSULE ORAL at 20:56

## 2021-04-11 ASSESSMENT — PAIN DESCRIPTION - PAIN TYPE: TYPE: ACUTE PAIN

## 2021-04-11 NOTE — CARE PLAN
Problem: Pain Management  Goal: Pain level will decrease to patient's comfort goal  Outcome: PROGRESSING AS EXPECTED  Note: Pt denies acute/chronic pain issues this evening.     Problem: Skin Integrity  Goal: Risk for impaired skin integrity will decrease  Outcome: PROGRESSING AS EXPECTED  Note: Pt is encouraged to change position frequently.

## 2021-04-11 NOTE — CARE PLAN
Problem: Communication  Goal: The ability to communicate needs accurately and effectively will improve  Outcome: PROGRESSING AS EXPECTED  Note: Pt has been educated on importance for appropriately calling for assistance or needs with call light, any questions/concerns answered at this time. Call light close by, will check on patient hourly.       Problem: Safety  Goal: Will remain free from injury  Outcome: PROGRESSING AS EXPECTED  Note: Discussed patient mobility status with interdisciplinary team, fall precautions in place, pt fall prevention education done, pt educated to call for assistance when needed.     Goal: Will remain free from falls  Outcome: PROGRESSING AS EXPECTED     Problem: Bowel/Gastric:  Goal: Normal bowel function is maintained or improved  Outcome: PROGRESSING AS EXPECTED  Note: Pt with colostomy with normal output  Goal: Will not experience complications related to bowel motility  Outcome: PROGRESSING AS EXPECTED     Problem: Knowledge Deficit  Goal: Knowledge of disease process/condition, treatment plan, diagnostic tests, and medications will improve  Outcome: PROGRESSING AS EXPECTED  Goal: Knowledge of the prescribed therapeutic regimen will improve  Outcome: PROGRESSING AS EXPECTED     Problem: Mobility  Goal: Risk for activity intolerance will decrease  Outcome: PROGRESSING AS EXPECTED  Note: Discussed plan to mobilize, active range of motion with resistance, educated on benefits of mobilization, risks of activity intolerance.

## 2021-04-11 NOTE — PROGRESS NOTES
With patient’s permission (if able), attempted to contact designated support person, name Jean BUENO Left voicemail with number to call back.

## 2021-04-11 NOTE — PROGRESS NOTES
"Report received from Braden PÉREZ and care of patient assumed. Suicide precautions remain in place. Pt still endorses active SI, and states \"It's rough tonight\". 1:1 safety sitter present.   "

## 2021-04-12 LAB
GLUCOSE BLD-MCNC: 119 MG/DL (ref 65–99)
GLUCOSE BLD-MCNC: 127 MG/DL (ref 65–99)
GLUCOSE BLD-MCNC: 133 MG/DL (ref 65–99)
GLUCOSE BLD-MCNC: 171 MG/DL (ref 65–99)

## 2021-04-12 PROCEDURE — A9270 NON-COVERED ITEM OR SERVICE: HCPCS | Performed by: STUDENT IN AN ORGANIZED HEALTH CARE EDUCATION/TRAINING PROGRAM

## 2021-04-12 PROCEDURE — 700102 HCHG RX REV CODE 250 W/ 637 OVERRIDE(OP): Performed by: STUDENT IN AN ORGANIZED HEALTH CARE EDUCATION/TRAINING PROGRAM

## 2021-04-12 PROCEDURE — 700102 HCHG RX REV CODE 250 W/ 637 OVERRIDE(OP): Performed by: FAMILY MEDICINE

## 2021-04-12 PROCEDURE — 700102 HCHG RX REV CODE 250 W/ 637 OVERRIDE(OP): Performed by: HOSPITALIST

## 2021-04-12 PROCEDURE — A9270 NON-COVERED ITEM OR SERVICE: HCPCS | Performed by: FAMILY MEDICINE

## 2021-04-12 PROCEDURE — A9270 NON-COVERED ITEM OR SERVICE: HCPCS | Performed by: HOSPITALIST

## 2021-04-12 PROCEDURE — 770001 HCHG ROOM/CARE - MED/SURG/GYN PRIV*

## 2021-04-12 PROCEDURE — 90837 PSYTX W PT 60 MINUTES: CPT | Performed by: PSYCHOLOGIST

## 2021-04-12 PROCEDURE — A9270 NON-COVERED ITEM OR SERVICE: HCPCS | Performed by: NURSE PRACTITIONER

## 2021-04-12 PROCEDURE — 700102 HCHG RX REV CODE 250 W/ 637 OVERRIDE(OP): Performed by: NURSE PRACTITIONER

## 2021-04-12 PROCEDURE — 82962 GLUCOSE BLOOD TEST: CPT | Mod: 91

## 2021-04-12 RX ADMIN — MIDODRINE HYDROCHLORIDE 5 MG: 5 TABLET ORAL at 11:56

## 2021-04-12 RX ADMIN — METOPROLOL SUCCINATE 25 MG: 25 TABLET, EXTENDED RELEASE ORAL at 04:57

## 2021-04-12 RX ADMIN — CLONAZEPAM 0.5 MG: 0.5 TABLET ORAL at 07:25

## 2021-04-12 RX ADMIN — ATORVASTATIN CALCIUM 40 MG: 40 TABLET, FILM COATED ORAL at 16:44

## 2021-04-12 RX ADMIN — OMEPRAZOLE 20 MG: 20 CAPSULE, DELAYED RELEASE ORAL at 05:00

## 2021-04-12 RX ADMIN — DOCUSATE SODIUM 50 MG AND SENNOSIDES 8.6 MG 2 TABLET: 8.6; 5 TABLET, FILM COATED ORAL at 07:26

## 2021-04-12 RX ADMIN — POTASSIUM CHLORIDE 20 MEQ: 20 TABLET, EXTENDED RELEASE ORAL at 04:57

## 2021-04-12 RX ADMIN — MIDODRINE HYDROCHLORIDE 5 MG: 5 TABLET ORAL at 07:25

## 2021-04-12 RX ADMIN — DOCUSATE SODIUM 50 MG AND SENNOSIDES 8.6 MG 2 TABLET: 8.6; 5 TABLET, FILM COATED ORAL at 20:15

## 2021-04-12 RX ADMIN — PAROXETINE HYDROCHLORIDE 20 MG: 20 TABLET, FILM COATED ORAL at 07:25

## 2021-04-12 RX ADMIN — SIMETHICONE 80 MG: 80 TABLET, CHEWABLE ORAL at 04:57

## 2021-04-12 RX ADMIN — FLUDROCORTISONE ACETATE 0.1 MG: 0.1 TABLET ORAL at 04:57

## 2021-04-12 RX ADMIN — HYDROCORTISONE 5 MG: 10 TABLET ORAL at 15:31

## 2021-04-12 RX ADMIN — ZIPRASIDONE HYDROCHLORIDE 60 MG: 60 CAPSULE ORAL at 20:15

## 2021-04-12 RX ADMIN — ZIPRASIDONE HYDROCHLORIDE 60 MG: 60 CAPSULE ORAL at 08:42

## 2021-04-12 RX ADMIN — INSULIN GLARGINE 5 UNITS: 100 INJECTION, SOLUTION SUBCUTANEOUS at 05:00

## 2021-04-12 RX ADMIN — DIGOXIN 125 MCG: 125 TABLET ORAL at 16:44

## 2021-04-12 RX ADMIN — APIXABAN 5 MG: 5 TABLET, FILM COATED ORAL at 07:26

## 2021-04-12 RX ADMIN — MIDODRINE HYDROCHLORIDE 5 MG: 5 TABLET ORAL at 16:44

## 2021-04-12 RX ADMIN — SIMETHICONE 80 MG: 80 TABLET, CHEWABLE ORAL at 11:57

## 2021-04-12 RX ADMIN — APIXABAN 5 MG: 5 TABLET, FILM COATED ORAL at 20:14

## 2021-04-12 RX ADMIN — LEVOTHYROXINE SODIUM 112 MCG: 0.11 TABLET ORAL at 05:00

## 2021-04-12 RX ADMIN — CLONAZEPAM 1 MG: 1 TABLET ORAL at 16:44

## 2021-04-12 RX ADMIN — HYDROCORTISONE 10 MG: 10 TABLET ORAL at 04:58

## 2021-04-12 RX ADMIN — SIMETHICONE 80 MG: 80 TABLET, CHEWABLE ORAL at 17:04

## 2021-04-12 NOTE — CARE PLAN
Problem: Communication  Goal: The ability to communicate needs accurately and effectively will improve  Outcome: PROGRESSING AS EXPECTED  Note: The patient will communicate needs with staff this shift      Problem: Safety  Goal: Will remain free from falls  Outcome: PROGRESSING AS EXPECTED  Note: Patient will not experience fall during this shift Patient will not experience fall during this shift      Problem: Mobility  Goal: Risk for activity intolerance will decrease  Outcome: PROGRESSING AS EXPECTED  Note: Patient will ambulate 60 more feet than previous walks without showing signs of activity intolerance     Problem: Pain Management  Goal: Pain level will decrease to patient's comfort goal  Outcome: PROGRESSING AS EXPECTED  Note: Patient will not experience pain on this shift

## 2021-04-12 NOTE — CONSULTS
"PSYCHOLOGICAL FOLLOW-UP:  Reason for admission: Syncope and collapse [R55]  Suicidal ideation [R45.851]  Sepsis due to Klebsiella pneumoniae (HCC) [A41.4]  Length of Visit: 60min    Legal status: Legal Status: Involuntary    Chief Complaint: \"I am trying!\"    HPI: Met with the patient to assess risk and crisis management. Patient presented with a depressed affect however he was laughing and smiling more today. He reported an \"ok\" mood. He immediately shared his anger about the previous session during which he believed this writer said that he wasn't \"trying\" to recover. Reflected the patient's anger and frustration and discussed the miscommunication. Explained that this writer knew that the patient wanted to try to recover and what he needed to do to continue his recovery. The patient continues to endorse suicidal thoughts with plan. However, today, his intent was questionable as he focused on explaining to this writer what he was doing to recover psychologically including engaging in his own ostomy care and walking the unit. He continually stated, \"I'm trying\" to this writer. This writer praised the patient in his efforts and reinforced how his efforts showed his desire to live. The patient committed to engaging in self-care and practicing psychotherapy skills over the next week.    Discussed the case with the team psychiatrist. While the patient's stated suicidality has not changed, his behavior suggests that his intent is not as strong as it was previously. That is, he is engaging in behavior that suggests that he wants to live. Will continue to reevaluate risk as the next session and discuss the case further with the team psychiatrist.     Psychiatric Examination:  Vitals: Blood pressure 119/66, pulse 61, temperature (!) 35.7 °C (96.3 °F), temperature source Temporal, resp. rate 20, height 1.829 m (6'), weight 118 kg (259 lb 8 oz), SpO2 94 %.  Musculoskeletal: shaking legs when anxious  Appearance and Eye " "Contact: appropriate dress and grooming. Behavior is calm, cooperative,  appropriate eye contact  Attention/Alertness: Alert  Thought Process: Linear, Logical and Goal Directed    Thought Content: No psychotic processes noted  Speech: Clear with normal rate and rhythm  Mood: \"ok\"            Affect: somewhat depressed. Laughing and smiling more today         SI/HI: Endorses    Memory: Recent and remote memory appear intact    Orientation: alert, oriented to person, place and time  Insight into symptoms: fair  Judgement into symptoms:fair    ASSESSMENT: While the patient continues to state suicidal ideations with plans, his behavior suggests that he wants to live (e.g. engaging in his own ostomy care, walking, asking for help.) He is also future focused on using psychotherapy skills. Will continue to assess progress on Friday and will discuss the case further with the team psychiatrist to see if his 6 month commitment can be lifted. For now, please continue to refer the patient to inpatient psychiatric hospitals.     DSM5 Diagnostic Considerations:   Major Depressive Disorder, recurrent, severe, with psychotic features      PLAN:  Legal status: Involuntary  Anticipate F/U within 72 hours.   Records reviewed: yes  Discussed patient with other provider: yes, team psychiatrist   Will continue to follow  Thank you for the consult.    Madhuri Fernandez, Ph.D.      "

## 2021-04-12 NOTE — PROGRESS NOTES
Assumed care of patient this am; AOx4. Patient denies pain and SOB at this time, but endorses suicidal ideation. Suicide precautions in place; 1:1 sitter at bedside. Will continue to monitor.

## 2021-04-12 NOTE — CARE PLAN
Problem: Safety  Goal: Will remain free from injury  Outcome: PROGRESSING AS EXPECTED  Sitter 1:1 in room through shift.      Problem: Psychosocial Needs:  Goal: Level of anxiety will decrease  Outcome: PROGRESSING AS EXPECTED  Patient still reporting feelings of SI. Discussed how once stoma heals he can get ostomy kits that fit stoma perfectly so he won't need to cut stoma piece - the patient stated that would be a good thing; the patient seemed optimistic.      Problem: Potential for Self Harm  Goal: No active self-harm  Outcome: PROGRESSING AS EXPECTED     Problem: Ineffective Coping  Goal: No active self-harm  Outcome: PROGRESSING AS EXPECTED     Problem: Urinary Elimination:  Goal: Ability to reestablish a normal urinary elimination pattern will improve  Outcome: PROGRESSING AS EXPECTED  Voiding well - no retention.     Problem: Bowel/Gastric:  Goal: Normal bowel function is maintained or improved  Outcome: PROGRESSING SLOWER THAN EXPECTED   The patient is still not willing to participate in ostomy care. Discussed with patient how to care for ostomy as I walked through emptying and burping the bag.    Problem: Skin Integrity  Goal: Risk for impaired skin integrity will decrease  Outcome: PROGRESSING SLOWER THAN EXPECTED  Midline wound dressing changed. Interdry sheets placed in folds - all skin folds cleaned. Mepilex placed on coccyx. High risk for skin breakdown.

## 2021-04-12 NOTE — LETTER
[facility]   [address], [address]  [place] [address]  Phone: [phone]  Fax: [phone]              Sebastián Castro  1965    Encounter Date: 8/23/2018    FLOYD Enciso          PROGRESS NOTE:  Chief Complaint   Patient presents with   • Atrial Fibrillation     Holter Monitor results       Subjective:   Sebastián Castro is a 53 y.o. male who presents today to follow-up on atrial fibrillation.  He had a syncopal episode and went to the hospital was found to be in rapid atrial fibrillation.  He was started on anticoagulation and amiodarone.  He had weakness in his legs therefore he went to [facility] rehab facility.    He was last seen by Juli MEJIA who ordered a Holter monitor  to determine his rate control.  He did have some rapid heart rates for the Holter monitor.  He was in atrial fibrillation continuously.    He feels generally well but occasionally will have some palpitations where he feels as though his heart races.  He does have some chest tightness when this occurs.  This is infrequent.    He is able to walk now and feels ready to go home.  He works as a  and wants to return to work in a couple weeks when his heart is stabilized.    Past Medical History:   Diagnosis Date   • A-fib (HCC)    • Chest pain    • Diabetes (HCC)    • Diabetic neuropathy (HCC)    • Hypercholesterolemia    • Hypertension    • Morbid obesity (HCC)    • Noncompliance    • Normal cardiac stress test      Past Surgical History:   Procedure Laterality Date   • CARDIAC CATH  07/21/2018    EF 45%, normal coronaries.   • IRRIGATION & DEBRIDEMENT ORTHO Right 2/19/2018    Procedure: IRRIGATION & DEBRIDEMENT ORTHO-FOOT;  Surgeon: Kirby Lopez M.D.;  Location: [facility];  Service: Orthopedics   • TOE AMPUTATION Right 1/17/2018    Procedure: TOE AMPUTATION-1ST RAY;  Surgeon: Doug Delong M.D.;  Location: [facility];  Service:  Orthopedics   • TOE AMPUTATION Right 11/10/2017    Procedure: TOE AMPUTATION;  Surgeon: Osorio Leo M.D.;  Location: [facility];  Service: Orthopedics     History reviewed. No pertinent family history.  Social History     Social History   • Marital status:      Spouse name: N/A   • Number of children: N/A   • Years of education: N/A     Occupational History   • Not on file.     Social History Main Topics   • Smoking status: Never Smoker   • Smokeless tobacco: Never Used   • Alcohol use No   • Drug use: No   • Sexual activity: Not on file     Other Topics Concern   • Not on file     Social History Narrative   • No narrative on file     Allergies   Allergen Reactions   • Daptomycin Rash     Body rash  RXN=1/2018     • Pcn [Penicillins] Hives and Rash      Rash & hives  RXN=since a kid   • Unasyn [Ampicillin-Sulbactam Sodium] Hives, Itching and Swelling   • Vancomycin Rash     Red man syndrome     Outpatient Encounter Prescriptions as of 8/23/2018   Medication Sig Dispense Refill   • amiodarone (CORDARONE) 200 MG Tab Take 1 Tab by mouth every day. 30 Tab 0   • cyanocobalamin (VITAMIN B12) 1000 MCG Tab Take 1 Tab by mouth every day. 30 Tab 2   • lisinopril (PRINIVIL) 5 MG Tab Take 1 Tab by mouth every day. 30 Tab 0   • metoprolol (LOPRESSOR) 25 MG Tab Take 1 Tab by mouth 2 Times a Day. 60 Tab 0   • rivaroxaban (XARELTO) 20 MG Tab tablet Take 1 Tab by mouth with dinner. 30 Tab 0   • famotidine (PEPCID) 20 MG Tab Take 1 Tab by mouth 2 times a day. 60 Tab 3   • aspirin 81 MG EC tablet Take 1 Tab by mouth every day. 30 Tab 3   • metFORMIN (GLUCOPHAGE) 500 MG Tab Take 2 Tabs by mouth 2 times a day, with meals. 60 Tab 0   • pravastatin (PRAVACHOL) 20 MG Tab Take 1 Tab by mouth every evening. 30 Tab 11   • acetaminophen (TYLENOL) 325 MG Tab Take 2 Tabs by mouth every 6 hours as needed (Mild Pain; (Pain scale 1-3); Temp greater than 100.5 F). 30 Tab 0     No facility-administered encounter medications on  file as of 8/23/2018.      Review of Systems   Constitutional: Negative for malaise/fatigue.   Respiratory: Positive for shortness of breath (when in AF ). Negative for cough.    Cardiovascular: Positive for chest pain (tightness when in fast A fib.) and palpitations (racing occasionally.). Negative for orthopnea, claudication, leg swelling and PND.   Gastrointestinal: Negative for abdominal pain.   Musculoskeletal: Negative for myalgias.   Neurological: Negative for dizziness and weakness.        Objective:   BP (!) 98/64   Pulse 82   Resp 12   Ht 1.829 m (6')   Wt 120.2 kg (265 lb)   SpO2 98%   BMI 35.94 kg/m²      Physical Exam   Constitutional: He is oriented to person, place, and time. He appears well-developed and well-nourished.   HENT:   Head: Normocephalic.   Eyes: EOM are normal.   Neck: Normal range of motion. No JVD present.   Cardiovascular: Normal rate.  An irregularly irregular rhythm present. Exam reveals no friction rub.    No murmur heard.  Pulmonary/Chest: Effort normal.   Abdominal: Soft. Bowel sounds are normal.   Musculoskeletal: He exhibits no edema (Fatty ankles but no edema.).   Neurological: He is alert and oriented to person, place, and time.   Skin: Skin is warm and dry.   Psychiatric: He has a normal mood and affect.     August 20, 2018: Holter monitor:   Interpretive Statements   *   Monitoring started at 11:26 AM and continued for 24 hours. Overall rhythm   was Atrial Fibrillation. The average heart rate was 86 BPM. The minimum heart   rate was 50 BPM, occurring at 11:27:28 PM. The maximum heart rate was 141   BPM, occurring at 3:31:36 PM.     *  Rare wide complex beats.  May well represent atrial beats with aberrancy.     *   Diary Entries- Symptoms listed in diary correlated with a heart rate   between  BPM with one isolated PVC.        Assessment:     1. Paroxysmal A-fib (HCC)     2. Other chest pain     3. Essential hypertension         Medical Decision Making:  Today's  Assessment / Status / Plan:     Atrial fibrillation: He is in atrial fibrillation but has good rate control in the office today.  We will have him continue amiodarone 200 mg and metoprolol 25 mg twice a day.    He will continue on Xarelto 20 mg for anticoagulation.  He has no bleeding or excessive bruising.    Chest pain: He experiences chest tightness when his heart is racing.  He had normal coronaries per cardiac catheterization in July therefore this is most likely related to the atrial fibrillation and not coronary disease.    Hypertension: His blood pressure is low in the office today but he denies lightheadedness.  I would like him to take lisinopril 5 mg in the evening instead of the morning.  I do not want to reduce the lisinopril dose since he is a diabetic.    He stable enough to follow-up in 3 months with Dr Sanchez or myself.  He will follow-up sooner if problems.    Collaborating Provider: Dr. Fernandez.        No Recipients               n/a

## 2021-04-12 NOTE — DISCHARGE PLANNING
Anticipated Discharge Disposition: Inpatient psych    Action: Patient discussed in IDT rounds.  Patient is pending placement in an inpatient psych facility.  KALIA Altamirano followed up with [facility], who suggested that the patient be placed in a SNF with a psychiatrist.    Barriers to Discharge: Placement    Plan: Case coordination to continue to follow up with medical team to discuss discharge barriers.

## 2021-04-12 NOTE — DISCHARGE PLANNING
Agency/Facility Name: [facility]  Spoke To: Juli  Outcome: Pt does not meet criteria, suggesting SNF with psychiatrist.

## 2021-04-13 LAB
ANION GAP SERPL CALC-SCNC: 8 MMOL/L (ref 7–16)
BUN SERPL-MCNC: 22 MG/DL (ref 8–22)
CALCIUM SERPL-MCNC: 9.6 MG/DL (ref 8.5–10.5)
CHLORIDE SERPL-SCNC: 101 MMOL/L (ref 96–112)
CO2 SERPL-SCNC: 27 MMOL/L (ref 20–33)
CREAT SERPL-MCNC: 0.95 MG/DL (ref 0.5–1.4)
GLUCOSE BLD-MCNC: 137 MG/DL (ref 65–99)
GLUCOSE BLD-MCNC: 143 MG/DL (ref 65–99)
GLUCOSE BLD-MCNC: 180 MG/DL (ref 65–99)
GLUCOSE SERPL-MCNC: 144 MG/DL (ref 65–99)
POTASSIUM SERPL-SCNC: 4 MMOL/L (ref 3.6–5.5)
SODIUM SERPL-SCNC: 136 MMOL/L (ref 135–145)

## 2021-04-13 PROCEDURE — A9270 NON-COVERED ITEM OR SERVICE: HCPCS | Performed by: FAMILY MEDICINE

## 2021-04-13 PROCEDURE — 700102 HCHG RX REV CODE 250 W/ 637 OVERRIDE(OP): Performed by: NURSE PRACTITIONER

## 2021-04-13 PROCEDURE — 700102 HCHG RX REV CODE 250 W/ 637 OVERRIDE(OP): Performed by: STUDENT IN AN ORGANIZED HEALTH CARE EDUCATION/TRAINING PROGRAM

## 2021-04-13 PROCEDURE — A9270 NON-COVERED ITEM OR SERVICE: HCPCS | Performed by: STUDENT IN AN ORGANIZED HEALTH CARE EDUCATION/TRAINING PROGRAM

## 2021-04-13 PROCEDURE — 770001 HCHG ROOM/CARE - MED/SURG/GYN PRIV*

## 2021-04-13 PROCEDURE — 36415 COLL VENOUS BLD VENIPUNCTURE: CPT

## 2021-04-13 PROCEDURE — 99232 SBSQ HOSP IP/OBS MODERATE 35: CPT | Performed by: NURSE PRACTITIONER

## 2021-04-13 PROCEDURE — A9270 NON-COVERED ITEM OR SERVICE: HCPCS | Performed by: NURSE PRACTITIONER

## 2021-04-13 PROCEDURE — 700102 HCHG RX REV CODE 250 W/ 637 OVERRIDE(OP): Performed by: HOSPITALIST

## 2021-04-13 PROCEDURE — 700102 HCHG RX REV CODE 250 W/ 637 OVERRIDE(OP): Performed by: FAMILY MEDICINE

## 2021-04-13 PROCEDURE — 80048 BASIC METABOLIC PNL TOTAL CA: CPT

## 2021-04-13 PROCEDURE — 82962 GLUCOSE BLOOD TEST: CPT | Mod: 91

## 2021-04-13 PROCEDURE — A9270 NON-COVERED ITEM OR SERVICE: HCPCS | Performed by: HOSPITALIST

## 2021-04-13 RX ORDER — MIDODRINE HYDROCHLORIDE 5 MG/1
2.5 TABLET ORAL
Status: DISCONTINUED | OUTPATIENT
Start: 2021-04-13 | End: 2021-04-24

## 2021-04-13 RX ORDER — DEXTROSE MONOHYDRATE 25 G/50ML
50 INJECTION, SOLUTION INTRAVENOUS
Status: DISCONTINUED | OUTPATIENT
Start: 2021-04-13 | End: 2021-06-15

## 2021-04-13 RX ORDER — AMOXICILLIN 250 MG
2 CAPSULE ORAL DAILY
Status: DISCONTINUED | OUTPATIENT
Start: 2021-04-14 | End: 2021-06-16

## 2021-04-13 RX ADMIN — APIXABAN 5 MG: 5 TABLET, FILM COATED ORAL at 07:34

## 2021-04-13 RX ADMIN — CLONAZEPAM 1 MG: 1 TABLET ORAL at 18:17

## 2021-04-13 RX ADMIN — SIMETHICONE 80 MG: 80 TABLET, CHEWABLE ORAL at 11:49

## 2021-04-13 RX ADMIN — MIDODRINE HYDROCHLORIDE 2.5 MG: 5 TABLET ORAL at 18:17

## 2021-04-13 RX ADMIN — APIXABAN 5 MG: 5 TABLET, FILM COATED ORAL at 21:16

## 2021-04-13 RX ADMIN — MIDODRINE HYDROCHLORIDE 5 MG: 5 TABLET ORAL at 07:35

## 2021-04-13 RX ADMIN — ZIPRASIDONE HYDROCHLORIDE 60 MG: 60 CAPSULE ORAL at 21:16

## 2021-04-13 RX ADMIN — INSULIN GLARGINE 5 UNITS: 100 INJECTION, SOLUTION SUBCUTANEOUS at 05:36

## 2021-04-13 RX ADMIN — PAROXETINE HYDROCHLORIDE 20 MG: 20 TABLET, FILM COATED ORAL at 07:35

## 2021-04-13 RX ADMIN — SIMETHICONE 80 MG: 80 TABLET, CHEWABLE ORAL at 18:16

## 2021-04-13 RX ADMIN — CLONAZEPAM 0.5 MG: 0.5 TABLET ORAL at 07:34

## 2021-04-13 RX ADMIN — HYDROCORTISONE 5 MG: 10 TABLET ORAL at 18:17

## 2021-04-13 RX ADMIN — METOPROLOL SUCCINATE 25 MG: 25 TABLET, EXTENDED RELEASE ORAL at 05:27

## 2021-04-13 RX ADMIN — MIDODRINE HYDROCHLORIDE 5 MG: 5 TABLET ORAL at 11:49

## 2021-04-13 RX ADMIN — INSULIN HUMAN 1 UNITS: 100 INJECTION, SOLUTION PARENTERAL at 05:38

## 2021-04-13 RX ADMIN — OMEPRAZOLE 20 MG: 20 CAPSULE, DELAYED RELEASE ORAL at 05:27

## 2021-04-13 RX ADMIN — ZIPRASIDONE HYDROCHLORIDE 60 MG: 60 CAPSULE ORAL at 08:45

## 2021-04-13 RX ADMIN — POTASSIUM CHLORIDE 20 MEQ: 20 TABLET, EXTENDED RELEASE ORAL at 05:27

## 2021-04-13 RX ADMIN — LEVOTHYROXINE SODIUM 112 MCG: 0.11 TABLET ORAL at 05:27

## 2021-04-13 RX ADMIN — SIMETHICONE 80 MG: 80 TABLET, CHEWABLE ORAL at 05:47

## 2021-04-13 RX ADMIN — HYDROCORTISONE 10 MG: 10 TABLET ORAL at 05:28

## 2021-04-13 RX ADMIN — FLUDROCORTISONE ACETATE 0.1 MG: 0.1 TABLET ORAL at 05:45

## 2021-04-13 RX ADMIN — ATORVASTATIN CALCIUM 40 MG: 40 TABLET, FILM COATED ORAL at 18:17

## 2021-04-13 RX ADMIN — DIGOXIN 125 MCG: 125 TABLET ORAL at 18:17

## 2021-04-13 ASSESSMENT — ENCOUNTER SYMPTOMS
DIARRHEA: 0
NAUSEA: 0
DIZZINESS: 0
CONSTIPATION: 0
SHORTNESS OF BREATH: 0
VOMITING: 0
PALPITATIONS: 0
NERVOUS/ANXIOUS: 0
MYALGIAS: 0
INSOMNIA: 0
FEVER: 0
HEADACHES: 0
COUGH: 0
ABDOMINAL PAIN: 0
WEAKNESS: 0
DEPRESSION: 1

## 2021-04-13 ASSESSMENT — PAIN DESCRIPTION - PAIN TYPE: TYPE: ACUTE PAIN

## 2021-04-13 NOTE — CARE PLAN
Problem: Communication  Goal: The ability to communicate needs accurately and effectively will improve  Outcome: PROGRESSING AS EXPECTED  Note: The patient will communicate needs with staff this shift      Problem: Venous Thromboembolism (VTW)/Deep Vein Thrombosis (DVT) Prevention:  Goal: Patient will participate in Venous Thrombosis (VTE)/Deep Vein Thrombosis (DVT)Prevention Measures  Outcome: PROGRESSING AS EXPECTED  Flowsheets (Taken 4/13/2021 2166)  Pharmacologic Prophylaxis Used: Apixaban  Note: Patient will not experience DVT during hospitalization      Problem: Potential for Self Harm  Goal: No active self-harm  Outcome: PROGRESSING AS EXPECTED  Note: Patient will not express desire for self harm     Problem: Ineffective Coping  Goal: No active self-harm  Outcome: PROGRESSING AS EXPECTED  Note: Patient will not express desire for self harm

## 2021-04-13 NOTE — PROGRESS NOTES
Pharmacy Pharmacotherapy Consult for LOS >30 days    Admit Date: 2/9/2021      Medications were reviewed for appropriateness and ongoing need.     Current Facility-Administered Medications   Medication Dose Route Frequency Provider Last Rate Last Admin   • [START ON 4/14/2021] senna-docusate (PERICOLACE or SENOKOT S) 8.6-50 MG per tablet 2 tablet  2 tablet Oral DAILY Jose De Jesus Villela A.P.R.N.       • hydrocortisone (CORTEF) tablet 5 mg  5 mg Oral Daily-1500 Jose De Jesus Villela A.P.R.N.   5 mg at 04/12/21 1531   • hydrocortisone (CORTEF) tablet 10 mg  10 mg Oral QAM Jose De Jesus Villela A.P.R.N.   10 mg at 04/13/21 0528   • fludrocortisone (FLORINEF) tablet 0.1 mg  0.1 mg Oral QAM Jose De Jesus Villela A.P.R.N.   0.1 mg at 04/13/21 0545   • midodrine (PROAMATINE) tablet 5 mg  5 mg Oral TID WITH MEALS Vansesa Martin M.D.   5 mg at 04/13/21 1149   • insulin glargine (Lantus) injection  5 Units Subcutaneous QAM INSULIN Jose De Jesus Villela A.P.R.N.   5 Units at 04/13/21 0536   • insulin regular (HumuLIN R,NovoLIN R) injection  1-6 Units Subcutaneous 4X/DAY VESTA Decker M.D.   Stopped at 04/13/21 1100    And   • glucose 4 g chewable tablet 16 g  16 g Oral Q15 MIN PRN Trent Decker M.D.        And   • dextrose 50% (D50W) injection 50 mL  50 mL Intravenous Q15 MIN PRN Trent Decker M.D.       • potassium chloride SA (Kdur) tablet 20 mEq  20 mEq Oral DAILY Trent Decker M.D.   20 mEq at 04/13/21 0527   • simethicone (MYLICON) chewable tab 80 mg  80 mg Oral TID Trent Decker M.D.   80 mg at 04/13/21 1149   • PARoxetine (PAXIL) tablet 20 mg  20 mg Oral DAILY Trent Decker M.D.   20 mg at 04/13/21 0735   • metoprolol SR (TOPROL XL) tablet 25 mg  25 mg Oral Q DAY Trent Decker M.D.   25 mg at 04/13/21 0527   • clonazePAM (KLONOPIN) tablet 0.5 mg  0.5 mg Oral DAILY Trent Decker M.D.   0.5 mg at 04/13/21 0734   • clonazePAM (KLONOPIN) tablet 1 mg  1 mg Oral Q EVENING Trent Decker M.D.   1 mg at 04/12/21 1644   • ziprasidone (GEODON)  capsule 60 mg  60 mg Oral BID Trent Decker M.D.   60 mg at 04/13/21 0845   • apixaban (ELIQUIS) tablet 5 mg  5 mg Oral BID Trent AME Decker M.D.   5 mg at 04/13/21 0734   • acetaminophen (Tylenol) tablet 650 mg  650 mg Oral Q4HRS PRN Trent Decker M.D.   650 mg at 03/31/21 0843   • digoxin (LANOXIN) tablet 125 mcg  125 mcg Oral DAILY AT 1800 Trent Decker M.D.   125 mcg at 04/12/21 1644   • ondansetron (ZOFRAN ODT) dispertab 4 mg  4 mg Oral Q4HRS PRN Carmel Phillips M.D.   4 mg at 04/02/21 0241   • atorvastatin (LIPITOR) tablet 40 mg  40 mg Oral Q EVENING Carmel Phillips M.D.   40 mg at 04/12/21 1644   • levothyroxine (SYNTHROID) tablet 112 mcg  112 mcg Oral AM ES Carmel Phillips M.D.   112 mcg at 04/13/21 0527       Recommendations:  1. Discontinue ondansetron PRN inj due to non-use  2. Discontinue promethazine tablet and supp due to non-use  3. Discontinue compazine prn inj due to non-use  4. Discontinue prn diphenhydramine due to non-use   5. Reduce scheduled senokot S from 2 tabs bid to 2 tabs daily due to sufficient effect  6. Discontinue omeprazole po due to no current need  7.  Discontinued prn bowel regimen including MoM, PEG, and bisacodyl due to non-use  8. Discontinue prn GI cocktail due to non-use    Discussed with DONOVAN, nurse practitioner student and Jose De Jesus JOHNSON APRN    Changes made as discussed  Long Arora, PharmD, BCPS

## 2021-04-13 NOTE — PROGRESS NOTES
Assumed care of patient this am; AOx4. Patient denies SOB and pain at this time. Bed low and locked. Suicide precautions in place. 1:1 sitter at bedside. Will continue to monitor.

## 2021-04-13 NOTE — PROGRESS NOTES
Assumed care of patient this evening for ELISA Devi. Patient resting in bed, denies pain, and vss. Patient states can turn self in bed and does not need asisstance at this time. 1:1 sitter remains at bedside for legal hold for SI. Will monitor.

## 2021-04-14 LAB
GLUCOSE BLD-MCNC: 112 MG/DL (ref 65–99)
GLUCOSE BLD-MCNC: 123 MG/DL (ref 65–99)

## 2021-04-14 PROCEDURE — 82962 GLUCOSE BLOOD TEST: CPT

## 2021-04-14 PROCEDURE — 700102 HCHG RX REV CODE 250 W/ 637 OVERRIDE(OP): Performed by: STUDENT IN AN ORGANIZED HEALTH CARE EDUCATION/TRAINING PROGRAM

## 2021-04-14 PROCEDURE — 700102 HCHG RX REV CODE 250 W/ 637 OVERRIDE(OP): Performed by: NURSE PRACTITIONER

## 2021-04-14 PROCEDURE — A9270 NON-COVERED ITEM OR SERVICE: HCPCS | Performed by: HOSPITALIST

## 2021-04-14 PROCEDURE — A9270 NON-COVERED ITEM OR SERVICE: HCPCS | Performed by: NURSE PRACTITIONER

## 2021-04-14 PROCEDURE — 99232 SBSQ HOSP IP/OBS MODERATE 35: CPT | Performed by: PSYCHIATRY & NEUROLOGY

## 2021-04-14 PROCEDURE — A9270 NON-COVERED ITEM OR SERVICE: HCPCS | Performed by: STUDENT IN AN ORGANIZED HEALTH CARE EDUCATION/TRAINING PROGRAM

## 2021-04-14 PROCEDURE — 302111 WAFER OST 2.25IN N IMG RD 2 PC (BARRIER): Performed by: NURSE PRACTITIONER

## 2021-04-14 PROCEDURE — 700102 HCHG RX REV CODE 250 W/ 637 OVERRIDE(OP): Performed by: HOSPITALIST

## 2021-04-14 PROCEDURE — 302098 PASTE RING (FLAT): Performed by: NURSE PRACTITIONER

## 2021-04-14 PROCEDURE — 302102 BAG OST N IMG 2.25IN 2PC (FECAL): Performed by: NURSE PRACTITIONER

## 2021-04-14 PROCEDURE — 770001 HCHG ROOM/CARE - MED/SURG/GYN PRIV*

## 2021-04-14 RX ADMIN — DIGOXIN 125 MCG: 125 TABLET ORAL at 17:37

## 2021-04-14 RX ADMIN — HYDROCORTISONE 5 MG: 10 TABLET ORAL at 17:36

## 2021-04-14 RX ADMIN — METOPROLOL SUCCINATE 25 MG: 25 TABLET, EXTENDED RELEASE ORAL at 06:28

## 2021-04-14 RX ADMIN — HYDROCORTISONE 10 MG: 10 TABLET ORAL at 06:29

## 2021-04-14 RX ADMIN — SIMETHICONE 80 MG: 80 TABLET, CHEWABLE ORAL at 06:29

## 2021-04-14 RX ADMIN — ZIPRASIDONE HYDROCHLORIDE 60 MG: 60 CAPSULE ORAL at 20:18

## 2021-04-14 RX ADMIN — ATORVASTATIN CALCIUM 40 MG: 40 TABLET, FILM COATED ORAL at 17:35

## 2021-04-14 RX ADMIN — ZIPRASIDONE HYDROCHLORIDE 60 MG: 60 CAPSULE ORAL at 11:51

## 2021-04-14 RX ADMIN — APIXABAN 5 MG: 5 TABLET, FILM COATED ORAL at 20:18

## 2021-04-14 RX ADMIN — LEVOTHYROXINE SODIUM 112 MCG: 0.11 TABLET ORAL at 06:28

## 2021-04-14 RX ADMIN — APIXABAN 5 MG: 5 TABLET, FILM COATED ORAL at 09:21

## 2021-04-14 RX ADMIN — CLONAZEPAM 0.5 MG: 0.5 TABLET ORAL at 09:21

## 2021-04-14 RX ADMIN — MIDODRINE HYDROCHLORIDE 2.5 MG: 5 TABLET ORAL at 11:51

## 2021-04-14 RX ADMIN — POTASSIUM CHLORIDE 20 MEQ: 20 TABLET, EXTENDED RELEASE ORAL at 06:27

## 2021-04-14 RX ADMIN — INSULIN GLARGINE 5 UNITS: 100 INJECTION, SOLUTION SUBCUTANEOUS at 06:29

## 2021-04-14 RX ADMIN — CLONAZEPAM 1 MG: 1 TABLET ORAL at 17:36

## 2021-04-14 RX ADMIN — PAROXETINE HYDROCHLORIDE 20 MG: 20 TABLET, FILM COATED ORAL at 09:21

## 2021-04-14 RX ADMIN — MIDODRINE HYDROCHLORIDE 2.5 MG: 5 TABLET ORAL at 06:28

## 2021-04-14 RX ADMIN — MIDODRINE HYDROCHLORIDE 2.5 MG: 5 TABLET ORAL at 17:35

## 2021-04-14 RX ADMIN — DOCUSATE SODIUM 50 MG AND SENNOSIDES 8.6 MG 2 TABLET: 8.6; 5 TABLET, FILM COATED ORAL at 06:27

## 2021-04-14 RX ADMIN — FLUDROCORTISONE ACETATE 0.1 MG: 0.1 TABLET ORAL at 06:28

## 2021-04-14 RX ADMIN — SIMETHICONE 80 MG: 80 TABLET, CHEWABLE ORAL at 17:36

## 2021-04-14 ASSESSMENT — ENCOUNTER SYMPTOMS
COUGH: 0
NAUSEA: 0
PALPITATIONS: 0
DIZZINESS: 0
ABDOMINAL PAIN: 0
HEADACHES: 0
SHORTNESS OF BREATH: 0
VOMITING: 0

## 2021-04-14 ASSESSMENT — PAIN DESCRIPTION - PAIN TYPE
TYPE: ACUTE PAIN
TYPE: ACUTE PAIN
TYPE: CHRONIC PAIN

## 2021-04-14 NOTE — DISCHARGE PLANNING
Anticipated Discharge Disposition: Inpatient psych    Action: Per chart review, patient is still on a 6 month legal commitment.  Pending inpatient psych placement.    Barriers to Discharge: Placement, acceptance     Plan: Case coordination to continue to follow up with medical team to discuss discharge barriers.

## 2021-04-14 NOTE — CONSULTS
"PSYCHIATRIC FOLLOW-UP:(established)  *Reason for admission:        *Legal Hold Status on Admission:            Chart reviewed.         *HPI: Patient was found having lunch.  Reported that he was looking forward to go for a walk in the unit, which brings him pleasure.  He also stated that he has been participating more in the care of his ostomy bag.  I asked patient what would happen if he did not change the ostomy bag, and he answered \" it could get infected.  It needs to be clean, sanitized\".  I asked him if he wanted to get the risk of getting infected, and he stated \" no.  He needs to be changed every 4 to 7 days\".  I told patient that an infection could eventually lead to death and ask if patient would be concerned about dying from infection, to which he stated \" yes\".  Patient also started reading the Bible.  He reported being a [employer], and confirmed that God is not in favor of suicide.  I asked if it that is something that he would be willing to do and he stated \" no, I would not want to kill myself\".  Although patient denied wanting to kill himself, later during evaluation stated that he is has had suicidal thoughts this morning.  I challenged those thoughts and his behavior, to which patient agreed that he has been participating care, and that his behaviors not of someone wants to harm himself.  Patient reported his mood today is \" so-so\", with fair sleep and appetite.  He continues to hear voices, but did not elaborate on them.  They have remained the same, have not worsened          Medical ROS (as pertinent):     Review of Systems   Respiratory: Negative for cough and shortness of breath.    Cardiovascular: Negative for chest pain and palpitations.   Gastrointestinal: Negative for abdominal pain, nausea and vomiting.   Neurological: Negative for dizziness and headaches.   Psychiatric/Behavioral:        See hpi           *Psychiatric Examination:  Vitals:   Vitals:    04/14/21 0738   BP: 123/70   Pulse: " "   Resp:    Temp:    SpO2:        General Appearance: Calm, cooperative and good eye contact  Abnormal Movements: None  Gait and Posture: Normal lying bed  Speech: Normal volume, tone and rhythm  Thought processes: Linear  Associations: No loose association  Abnormal or Psychotic Thoughts: No change in auditory hallucinations, not preoccupied with them  Judgement and Insight: Fair  Orientation: Grossly oriented  Recent and Remote Memory:   Attention Span and Concentration: Intact intact  Language: Fluid  Fund of Knowledge: Not  Mood and Affect:\" So-so\", constricted  SI/HI: Endorses suicidal ideations, however stated that he does not want to kill himself.         *Labs personally reviewed:   Recent Results (from the past 72 hour(s))   POCT glucose device results    Collection Time: 04/11/21  4:49 PM   Result Value Ref Range    Glucose - Accu-Ck 108 (H) 65 - 99 mg/dL   POCT glucose device results    Collection Time: 04/11/21  8:54 PM   Result Value Ref Range    Glucose - Accu-Ck 128 (H) 65 - 99 mg/dL   POCT glucose device results    Collection Time: 04/12/21  4:52 AM   Result Value Ref Range    Glucose - Accu-Ck 119 (H) 65 - 99 mg/dL   POCT glucose device results    Collection Time: 04/12/21 11:30 AM   Result Value Ref Range    Glucose - Accu-Ck 133 (H) 65 - 99 mg/dL   POCT glucose device results    Collection Time: 04/12/21  4:39 PM   Result Value Ref Range    Glucose - Accu-Ck 127 (H) 65 - 99 mg/dL   POCT glucose device results    Collection Time: 04/12/21  7:35 PM   Result Value Ref Range    Glucose - Accu-Ck 171 (H) 65 - 99 mg/dL   POCT glucose device results    Collection Time: 04/13/21  5:33 AM   Result Value Ref Range    Glucose - Accu-Ck 180 (H) 65 - 99 mg/dL   Basic Metabolic Panel    Collection Time: 04/13/21  8:36 AM   Result Value Ref Range    Sodium 136 135 - 145 mmol/L    Potassium 4.0 3.6 - 5.5 mmol/L    Chloride 101 96 - 112 mmol/L    Co2 27 20 - 33 mmol/L    Glucose 144 (H) 65 - 99 mg/dL    Bun 22 8 - " "22 mg/dL    Creatinine 0.95 0.50 - 1.40 mg/dL    Calcium 9.6 8.5 - 10.5 mg/dL    Anion Gap 8.0 7.0 - 16.0   ESTIMATED GFR    Collection Time: 04/13/21  8:36 AM   Result Value Ref Range    GFR If African American >60 >60 mL/min/1.73 m 2    GFR If Non African American >60 >60 mL/min/1.73 m 2   POCT glucose device results    Collection Time: 04/13/21 11:42 AM   Result Value Ref Range    Glucose - Accu-Ck 143 (H) 65 - 99 mg/dL   POCT glucose device results    Collection Time: 04/13/21  4:25 PM   Result Value Ref Range    Glucose - Accu-Ck 137 (H) 65 - 99 mg/dL   POCT glucose device results    Collection Time: 04/14/21  6:35 AM   Result Value Ref Range    Glucose - Accu-Ck 112 (H) 65 - 99 mg/dL   POCT glucose device results    Collection Time: 04/14/21 11:45 AM   Result Value Ref Range    Glucose - Accu-Ck 123 (H) 65 - 99 mg/dL         Assessment: I discussed this case in length with psychiatric team, including Dr. Fernandez and Dr. Zahida Posey.  In my opinion, although patient endorses chronic suicidal ideation, he has been accepting all medical care offered to him.  He also was able to return to the hospital shortly after he was discharged, seeking medical help.  Patient has been able to advocate for himself, including stating when he is having suicidal thoughts, requesting for things to be removed out of his way.  He has not attempted suicide neither has attempted to harm himself in any ways during this admission.  He constantly states that he wants to be in the place where he is \" safe\", which in my opinion, again emphasizes that he wants to live. He has accepted care, specifically to his ostomy bag throughout this hospitalization.  He has also told me that he would like to be in the hospital until he can have a reverse surgery.  Patient today clearly stated that he does not want to kill himself.  Patient's behavior is not of someone that is actively suicidal.  We will continue to observe patient's behavior and mood for " the next few days, and as discussed with psychiatric team, I am planning to request for his legal hold to be lifted next week after Dr. Fernandez's therapy session with patient over the weekend.    Dx:  Major Depressive Disorder, recurrent, severe, with psychotic features          Plan:  1- Legal hold: Involuntary, on a 6-month commitment by court  2- Psychotropic medications: Continue current regimen  3- Please transfer pt to inpatient psychiatric hospital when bed is available  4- Discussed the case with: Dr. Jim Posey  5- Psychiatry will follow up.   Thank you for the consult.       Sitter: Yes  Phone: Yes  Visitors: Yes  Personal belongings: Yes    This note was created using voice recognition software (Dragon). The accuracy of the dictation is limited by the abilities of the software. I have reviewed the note prior to signing. However, error related to voice recognition software and /or scribes may still exist and should be interpreted within the appropriate context.

## 2021-04-14 NOTE — PROGRESS NOTES
"Hospital Medicine Twice Weekly Progress Note    Date of Service  04/13/21    Chief Complaint  Suicidal ideation    Hospital Course  Mr. Castro is a 56 year-old man with a past medical history significant for atrial fibrillation, secondary hypercoagulable state, diabetes mellitus complicated by diabetic neuropathy, chronic systolic CHF, colostomy placement, orthostatic hypotension (maintained on florinef), and dyslipidemia who presented to the ED on 2/9/2021 after he became dizzy and had a syncopal episode at home.  In the ED he was noted to be hypotensive and bradycardic. Cardiology was consulted and recommended stopping all AV sanket blocking medications.  He remained orthostatic during his hospitalization so midodrine was started.  An echocardiogram was obtained and showed mild mitral regurgitation and an EF of 60% which was improved from his previous echocardiogram obtained on 11/2020 (previously 35%).  CTAs of the head and neck were done and were unremarkable outside of distal bilateral ICA tortuosity.  Also during his hospitalization he expressed desire to kill himself.  He has a colostomy and has become overwhelmed by caring for it.  He was subsequently placed on a legal hold and psychiatry was consulted.  He is now pending [facility] placement at the 6-month db for maturity of his colostomy.    Interval Problem Update  Patient is awake and sitting up in chair, in no acute distress. Sitter in room at bedside. He states he is having a \"bad day\" today and still having suicidal ideation with a lethal plan.  He states he would slit his throat.  He had a previous attempt where he cut his wrists.  He endorses improved ability to care for ostomy. He continues to need practice with fitting and changing the dressing. He denies any dizziness, lightheadedness, urinary retention and any other problems.    - Discussed case with nursing at bedside and pharmacy for medication review.   - Blood pressures improving. Decreased " Pt reports cc of increased dizziness with vision changes. Pt reports symptoms of dizziness, fatigue, changes in bowel movements, and \"thrush in mouth\" x 1 year. Pt denies current CP or SOB. Pt denies current speech changes. No code S called at this time, per provider. midodrine to 2.5 TID, monitor  - Discontinued omeprazole- not clinically indicated   - Discontinued SSI as BG in normal range and SSI coverage not required   - Patient has not required benadryl, GI cocktail, Atarax, compazine, phenergan, bisacodyl, milk of mag, and miralax. Discontinued.      Consultants/Specialty  Cardiology  Psychiatry  General surgery    Code Status  Full Code    Disposition  Pending [facility] placement, likely to happen in May at the maturation of his colostomy.    Review of Systems  Review of Systems   Constitutional: Negative for fever and malaise/fatigue.   Respiratory: Negative for cough and shortness of breath.    Cardiovascular: Positive for leg swelling. Negative for chest pain and palpitations.   Gastrointestinal: Negative for abdominal pain, constipation, diarrhea (Liquid stool present within his colostomy), nausea and vomiting.   Genitourinary: Negative for dysuria, frequency and urgency.        No urinary retention   Musculoskeletal: Negative for myalgias.   Neurological: Negative for dizziness, weakness and headaches.   Psychiatric/Behavioral: Positive for depression and suicidal ideas. The patient is not nervous/anxious and does not have insomnia.    All other systems reviewed and are negative.     Physical Exam  Temp:  [35.9 °C (96.7 °F)-36.1 °C (97 °F)] 36.1 °C (96.9 °F)  Pulse:  [61-75] 75  Resp:  [16-20] 20  BP: (119-129)/(56-81) 126/56  SpO2:  [93 %] 93 %    Physical Exam  Vitals and nursing note reviewed.   Constitutional:       General: He is awake. He is not in acute distress.     Appearance: Normal appearance. He is well-developed and overweight. He is not ill-appearing.   HENT:      Head: Normocephalic and atraumatic.      Mouth/Throat:      Lips: Pink.      Mouth: Mucous membranes are moist.   Eyes:      Conjunctiva/sclera: Conjunctivae normal.      Pupils: Pupils are equal, round, and reactive to light.   Cardiovascular:      Rate and Rhythm: Normal rate. Rhythm irregularly  irregular.      Pulses: Normal pulses.      Heart sounds: Normal heart sounds.   Pulmonary:      Effort: Pulmonary effort is normal.      Breath sounds: Normal breath sounds.   Abdominal:      General: Bowel sounds are normal. There is no distension or abdominal bruit.      Palpations: Abdomen is soft.      Tenderness: There is no abdominal tenderness.       Musculoskeletal:      Cervical back: Normal range of motion and neck supple.      Right lower leg: Edema (stable) present.      Left lower leg: Edema (stable) present.   Skin:     General: Skin is warm and dry.      Coloration: Skin is pale.   Neurological:      General: No focal deficit present.      Mental Status: He is alert and oriented to person, place, and time.   Psychiatric:         Attention and Perception: Attention and perception normal.         Mood and Affect: Affect normal. Mood is depressed. Affect is not angry or tearful.         Speech: Speech normal.         Behavior: Behavior normal. Behavior is cooperative.         Thought Content: Thought content includes suicidal ideation. Thought content includes suicidal plan.         Cognition and Memory: Memory normal.     Fluids    Intake/Output Summary (Last 24 hours) at 4/13/2021 1704  Last data filed at 4/13/2021 0700  Gross per 24 hour   Intake --   Output 470 ml   Net -470 ml     Laboratory      Recent Labs     04/13/21  0836   SODIUM 136   POTASSIUM 4.0   CHLORIDE 101   CO2 27   GLUCOSE 144*   BUN 22   CREATININE 0.95   CALCIUM 9.6     Imaging  [id number] VIEW   Final Result         No significant interval change.      DX-CHEST-LIMITED (1 VIEW)   Final Result         Diffuse interstitial prominence could relate to mild pulmonary edema or atypical infection      [id number] VIEW   Final Result      Increased colonic gas without definite bowel obstruction.      US-RENAL   Final Result      No evidence of hydronephrosis.      Lobulated kidneys bilaterally.      CT-ABDOMEN-PELVIS WITH   Final  Result      1.  There is no evidence of small bowel obstruction.   2.  There is a left lower quadrant ostomy.   3.  There is fluid distention of the colon distal to the surgical material in the left mid descending colon extending all the way to the rectum. There is no pneumatosis or free air.   4.  There is cholelithiasis without biliary dilatation.   5.  There has been interval removal of the drainage catheter anterior to the spleen with minimal hypodense area in the anterior spleen again noted. There is no new fluid collection.      IR-US GUIDED PIV   Final Result    Ultrasound-guided PERIPHERAL IV INSERTION performed by    qualified nursing staff as above.      EC-ECHOCARDIOGRAM COMPLETE W/O CONT   Final Result      DX-LUMBAR SPINE-2 OR 3 VIEWS   Final Result      Moderate compression deformity of T11 is new compared to 2018.      Mild wedge deformity of T12 is unchanged.      Degenerative changes including facet arthropathy.      Mild retrolisthesis of L5 on S1 and L3 on L4.            Assessment/Plan  * Suicidal ideation- (present on admission)  Assessment & Plan  -Has history of MDD, recurrent with psychotic features and active SI w/ plan.  -Currently on legal hold.  -Psychiatry following, appreciate assistance.  Medication per their discretion.  -Pending placement to [facility].   -Continue suicide precautions and sitter at bedside.    Orthostatic hypotension- (present on admission)  Assessment & Plan  -Multifactorial including adrenal insufficiency, medications (beta blocker, CCB, tamsulosin), diabetic autonomic dysfunction, and venous insufficiency.  -EF is noted to be 60%, significant improvement from prior (previously 35%).  -Per cardiology, no further cardiac work-up needed.  Recommends follow-up with them as outpatient.  -Continue midodrine.  -Fall precautions.  -Continue to educate on the importance of slow positional changes.   - Started PO hydrocortisone 4/7/2021 for AI. Continuing florinef.   - Recheck  orthostatics BPs for two days starting tomorrow 4/14/2021   - Decrease midodrine to 2.5mg in attempt to wean       Paroxysmal atrial fibrillation (HCC)- (present on admission)  Assessment & Plan  -CHADSVASC 3 (HTN, CHF, DM).   -TTE Feb 2021: EF 60%.  -Continue metoprolol, digoxin, and eliquis.    Chronic heart failure with preserved ejection fraction (HCC)- (present on admission)  Assessment & Plan  -Now stable.  -Pulmonary edema noted in mid-March, was placed on IV diuresis though that was held when he developed a gram-negative gina bacteremia and sepsis.  Currently doing well from a respiratory standpoint, no shortness of breath, lungs clear to auscultation bilaterally, not requiring oxygen supplementation.  -Repeat echocardiogram showed improvement in his ejection fraction to 60%.    -Continue metoprolol succinate.  -Lisinopril held per cardiology recommendations due to hypotension.    Diabetes mellitus type 2, insulin dependent (HCC)- (present on admission)  Assessment & Plan  -A1c 5.2% on 2/14/2021.  -Continue diabetic diet.  -Continue lantus at current dose with SSI as required.  -Hypoglycemia protocol in place if needed.  -Discontinued SSI: blood glucose well controlled on current regimen.    Major depressive disorder, recurrent, severe with psychotic features (HCC)- (present on admission)  Assessment & Plan  -Management as listed under 'anxiety and depression'.    Anxiety and depression- (present on admission)  Assessment & Plan  -Legal hold extended, awaiting acceptance at psych facility ([facility] will take in May).  -Remains suicidal.   -Psychiatry following.  -Continue paxil, klonopin, & geodon.     Hypothyroidism- (present on admission)  Assessment & Plan  -TFTs in Feb were WNL. Continue levothyroxine at current dose.     Adrenal insufficiency (HCC)- (present on admission)  Assessment & Plan  -Started PO hydrocortisone 4/7/2021. Continue florinef.   -Needs outpatient endocrinology follow up.  -Monitor  potassium while on florinef. 4.4 on 4/7/2021.  -Repeat orthostatics periodically if symptomatic        -Recheck orthostatics BPs for two days starting tomorrow 4/14/2021 to monitor midodrine weaning     Obstructive uropathy- (present on admission)  Assessment & Plan  -Trial off of flomax given his hypotension.  Discontinued 4/6/2021. No retention thus far.     Chronic venous insufficiency of lower extremity- (present on admission)  Assessment & Plan  -Continue compression stockings.    Colostomy present (HCC)- (present on admission)  Assessment & Plan  -History of fall at home Nov 2020 on left side with perforated colon and splenic fluid collection/hematoma s/p segmental colectomy, colostomy, mobilization of the splenic flexure, wound vac, and I&D Nov 2020.  -Wound care consulted for ostomy care, but he was non-participatory. He is now engaging in ostomy care with his bedside RNs, encouraged to be observant but do it independently.  -Patient must be independently able to care for his ostomy before inpatient psychiatry will accept him.  Per surgery Dr. Desouza, ostomy reversal is high risk given patient's complexities. Plan for close multidisciplinary outpatient follow-up for surgery when patients overall medical conditions are more optimized.    Debility- (present on admission)  Assessment & Plan  -Cleared by PT/OT.  -Out of bed for each meal and ambulate in the hallways at least twice per shift.    Mixed hyperlipidemia- (present on admission)  Assessment & Plan  Lab Results   Component Value Date/Time    CHOLSTRLTOT 146 02/14/2021 01:30 AM    LDL 82 02/14/2021 01:30 AM    HDL 32 (A) 02/14/2021 01:30 AM    TRIGLYCERIDE 160 (H) 02/14/2021 01:30 AM   -Continue atorvastatin.     Lower limb amputation, great toe (HCC)- (present on admission)  Assessment & Plan  -History of, 2018.  Site well-healed.    Class 2 severe obesity with serious comorbidity and body mass index (BMI) of 35.0 to 35.9 in adult (HCC)- (present on  admission)  Assessment & Plan  Body mass index is 35.19 kg/m².-Trending down.  -Could benefit from outpatient weight loss counseling which can be arranged through his PCP after hospital discharge.       VTE prophylaxis: CRYSTAL Zamora.

## 2021-04-15 LAB — GLUCOSE BLD-MCNC: 138 MG/DL (ref 65–99)

## 2021-04-15 PROCEDURE — 700102 HCHG RX REV CODE 250 W/ 637 OVERRIDE(OP): Performed by: STUDENT IN AN ORGANIZED HEALTH CARE EDUCATION/TRAINING PROGRAM

## 2021-04-15 PROCEDURE — 302111 WAFER OST 2.25IN N IMG RD 2 PC (BARRIER): Performed by: NURSE PRACTITIONER

## 2021-04-15 PROCEDURE — A9270 NON-COVERED ITEM OR SERVICE: HCPCS | Performed by: NURSE PRACTITIONER

## 2021-04-15 PROCEDURE — 302098 PASTE RING (FLAT): Performed by: NURSE PRACTITIONER

## 2021-04-15 PROCEDURE — A9270 NON-COVERED ITEM OR SERVICE: HCPCS | Performed by: STUDENT IN AN ORGANIZED HEALTH CARE EDUCATION/TRAINING PROGRAM

## 2021-04-15 PROCEDURE — 700102 HCHG RX REV CODE 250 W/ 637 OVERRIDE(OP): Performed by: NURSE PRACTITIONER

## 2021-04-15 PROCEDURE — 770001 HCHG ROOM/CARE - MED/SURG/GYN PRIV*

## 2021-04-15 PROCEDURE — 82962 GLUCOSE BLOOD TEST: CPT

## 2021-04-15 PROCEDURE — A9270 NON-COVERED ITEM OR SERVICE: HCPCS | Performed by: HOSPITALIST

## 2021-04-15 PROCEDURE — 700102 HCHG RX REV CODE 250 W/ 637 OVERRIDE(OP): Performed by: HOSPITALIST

## 2021-04-15 RX ADMIN — SIMETHICONE 80 MG: 80 TABLET, CHEWABLE ORAL at 12:30

## 2021-04-15 RX ADMIN — FLUDROCORTISONE ACETATE 0.1 MG: 0.1 TABLET ORAL at 05:05

## 2021-04-15 RX ADMIN — ZIPRASIDONE HYDROCHLORIDE 60 MG: 60 CAPSULE ORAL at 21:19

## 2021-04-15 RX ADMIN — APIXABAN 5 MG: 5 TABLET, FILM COATED ORAL at 21:19

## 2021-04-15 RX ADMIN — MIDODRINE HYDROCHLORIDE 2.5 MG: 5 TABLET ORAL at 17:12

## 2021-04-15 RX ADMIN — HYDROCORTISONE 5 MG: 10 TABLET ORAL at 15:53

## 2021-04-15 RX ADMIN — CLONAZEPAM 1 MG: 1 TABLET ORAL at 17:12

## 2021-04-15 RX ADMIN — MIDODRINE HYDROCHLORIDE 2.5 MG: 5 TABLET ORAL at 12:30

## 2021-04-15 RX ADMIN — LEVOTHYROXINE SODIUM 112 MCG: 0.11 TABLET ORAL at 05:05

## 2021-04-15 RX ADMIN — DIGOXIN 125 MCG: 125 TABLET ORAL at 17:12

## 2021-04-15 RX ADMIN — METOPROLOL SUCCINATE 25 MG: 25 TABLET, EXTENDED RELEASE ORAL at 05:05

## 2021-04-15 RX ADMIN — POTASSIUM CHLORIDE 20 MEQ: 20 TABLET, EXTENDED RELEASE ORAL at 05:05

## 2021-04-15 RX ADMIN — ATORVASTATIN CALCIUM 40 MG: 40 TABLET, FILM COATED ORAL at 17:12

## 2021-04-15 RX ADMIN — SIMETHICONE 80 MG: 80 TABLET, CHEWABLE ORAL at 17:12

## 2021-04-15 RX ADMIN — MIDODRINE HYDROCHLORIDE 2.5 MG: 5 TABLET ORAL at 07:34

## 2021-04-15 RX ADMIN — SIMETHICONE 80 MG: 80 TABLET, CHEWABLE ORAL at 05:05

## 2021-04-15 RX ADMIN — INSULIN GLARGINE 5 UNITS: 100 INJECTION, SOLUTION SUBCUTANEOUS at 06:35

## 2021-04-15 RX ADMIN — PAROXETINE HYDROCHLORIDE 20 MG: 20 TABLET, FILM COATED ORAL at 07:34

## 2021-04-15 RX ADMIN — HYDROCORTISONE 10 MG: 10 TABLET ORAL at 05:05

## 2021-04-15 RX ADMIN — ZIPRASIDONE HYDROCHLORIDE 60 MG: 60 CAPSULE ORAL at 08:27

## 2021-04-15 RX ADMIN — APIXABAN 5 MG: 5 TABLET, FILM COATED ORAL at 07:34

## 2021-04-15 RX ADMIN — CLONAZEPAM 0.5 MG: 0.5 TABLET ORAL at 05:05

## 2021-04-15 ASSESSMENT — PAIN DESCRIPTION - PAIN TYPE: TYPE: ACUTE PAIN

## 2021-04-15 NOTE — WOUND TEAM
[facility]  Inpatient Services  Wound and Skin Care Progress Note    Admission Date: 2/9/2021     Last order of IP CONSULT TO WOUND CARE was found on 3/18/2021 from Hospital Encounter on 2/9/2021     HPI, PMH, SH: Reviewed    Past Surgical History:   Procedure Laterality Date   • COLECTOMY N/A 11/10/2020    Procedure: COLECTOMY-SEGMENTAL, COLOSTOMY, MOBILIZATION OF SPLENIC FLEXURE, WOUND VAC PLACEMENT, IRRIGATION AND DEBRIDMENT FLANK WOUND;  Surgeon: Kin Desouza D.O.;  Location: [facility];  Service: General   • ZZZ CARDIAC CATH  07/21/2018    EF 45%, normal coronaries.   • IRRIGATION & DEBRIDEMENT ORTHO Right 2/19/2018    Procedure: IRRIGATION & DEBRIDEMENT ORTHO-FOOT;  Surgeon: Kirby Lopez M.D.;  Location: [facility];  Service: Orthopedics   • TOE AMPUTATION Right 1/17/2018    Procedure: TOE AMPUTATION-1ST RAY;  Surgeon: Doug Delong M.D.;  Location: [facility];  Service: Orthopedics   • TOE AMPUTATION Right 11/10/2017    Procedure: TOE AMPUTATION;  Surgeon: Osorio Leo M.D.;  Location: [facility];  Service: Orthopedics     Social History     Tobacco Use   • Smoking status: Never Smoker   • Smokeless tobacco: Never Used   Substance Use Topics   • Alcohol use: No     Chief Complaint   Patient presents with   • Syncope     X1 this morning when standing up to go to bathroom     Diagnosis: Syncope and collapse [R55]  Suicidal ideation [R45.851]  Sepsis due to Klebsiella pneumoniae (HCC) [A41.4]    Unit where seen by Wound Team: S148/00     WOUND CONSULT/FOLLOW UP RELATED TO:  Sacrum & R posterior thigh follow up    WOUND HISTORY:  Pt is an older gentleman well known to [facility] wound team 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 Pt currently admitted since 2/9/21 related to SI concerns due to body image issues related to colostomy. Wound team was consulted regarding sacrum and Right posterior thigh wounds.     WOUND  ASSESSMENT/LDA     Wound 11/10/20 Full Thickness Wound Abdomen Midline resolving open surgical  (Active)   Wound Image   04/15/21 1356   Site Assessment Red ]   Periwound Assessment Intact    Margins Attached edges    Closure Secondary intention    Drainage Amount Scant    Drainage Description Sanguineous    Treatments Site care    Wound Cleansing Normal Saline Irrigation    Periwound Protectant Skin Protectant Wipes to Periwound    Dressing Cleansing/Solutions Not Applicable    Dressing Options Collagen Dressing;Hydrofera Blue Ready    Dressing Changed Changed    Dressing Status Intact    Dressing Change/Treatment Frequency Every 72 hrs, and As Needed    NEXT Dressing Change/Treatment Date 04/18/21    NEXT Weekly Photo (Inpatient Only) 04/22/21    Number of Staples Removed 10    Non-staged Wound Description Full thickness    Wound Length (cm) 3 cm    Wound Width (cm) 5 cm    Wound Depth (cm) 0 cm    Wound Surface Area (cm^2) 15 cm^2    Wound Volume (cm^3) 0 cm^3    Wound Healing % 100    Tunneling (cm) 0 cm    Undermining (cm) 0 cm    Shape irregular    Wound Odor None    Pulses N/A    Exposed Structures None          Vascular:    SHAYAN:   No results found.    Lab Values:    Lab Results   Component Value Date/Time    WBC 9.7 03/30/2021 06:27 AM    RBC 5.25 03/30/2021 06:27 AM    HEMOGLOBIN 14.9 03/30/2021 06:27 AM    HEMATOCRIT 47.2 03/30/2021 06:27 AM    CREACTPROT 7.52 (H) 11/29/2020 01:25 AM    SEDRATEWES 13 10/23/2018 08:02 AM    HBA1C 5.2 02/14/2021 01:30 AM      Culture Results show:  No results found for this or any previous visit (from the past 720 hour(s)).    Pain Level/Medicated:  Denies       INTERVENTIONS BY WOUND TEAM:  Chart and images reviewed. Discussed with bedside RN. All areas of concern (based on picture review, LDA review and discussion with bedside RN) have been thoroughly assessed. Documentation of areas based on significant findings. This RN in to assess patient. Performed standard wound care  which includes appropriate positioning, dressing removal and non-selective debridement. Pictures and measurements obtained weekly if/when required.              ABD:  Preparation for Dressing removal: None   Cleansed with:  NS and gauze  Sharp debridement: NA  Joya wound: no sting skin prep  Primary Dressing: Moistened arti (collagen), hydrofera blue,   Secondary (Outer) Dressing:  Secured with hypafix tape    Interdisciplinary consultation: Patient, Bedside RN    EVALUATION / RATIONALE FOR TREATMENT:  Most Recent Date:  4/15: Right posterior thigh DTI now resolved. Pink and blanching. Abdominal wound to surface level continuing with dressing POC.    4/7/21: Thigh wound nearly healed and will likely be resolved at next assessment. Abdomen wound appears larger. Continued with Arti to move through inflammatory phase. Hydrofera blue for its bacteriostatic properties and to assist in drainage management.     03/31/21: thigh wound healing well now to a stage 2.  Continue current plan.  All interventions in place.   ABD more denuded and bloody.  Arti to assist in moving wound through inflammatory phase.   3/23/21: Right posterior thigh wound remains a DTI but appears to be evolving into a stage 2. Wound team was previously consulted regarding sacral wound which appeared to be a skin tear however skin now appears to be intact however discolored. Does not appear to be pressure related as pt is on a waffle, self mobile and sacral mepilex is in use. Area is also not a normal pressure injury shape. Wound team will continue to follow.   3/18: Pt with linear DTIs to R posterior thigh from lying on top of frye cath tubing. Offloading precautions ordered. Wound team to continue following.     Goals: Steady decrease in wound area and depth weekly.    WOUND TEAM PLAN OF CARE ([X] for frequency of wound follow up,):   Nursing to follow orders written for wound care. Contact wound team if area fails to progress, deteriorates or  "with any questions/concerns  Dressing changes by wound team:                   Follow up 3 times weekly:                NPWT change 3 times weekly:     Follow up 1-2 times weekly: X nursing to perform dressing care; WT following     Follow up Bi-Monthly:                   Follow up as needed:     Other (explain):     NURSING PLAN OF CARE ORDERS (X):  Dressing changes: See Dressing Care orders:   Skin care: See Skin Care orders: x  RN Prevention Protocol: x  Rectal tube care: See Rectal Tube Care orders:   Other orders:        Anticipated discharge plans: TBD- pending psych transfer  LTACH:        SNF/Rehab:                  Home Health Care:           Outpatient Wound Center:            Self/Family Care:        Other:                        [facility]   Inpatient Services   Established Ostomy Management/ troubleshooting  HPI: Reviewed  PMH: Reviewed   SH: Reviewed   Reason for Ostomy nurse consult:  Established colostomy        Objective: appliance intact, sitter at bedside, pt on legal hold.       Colostomy 11/10/20 Transverse LUQ (Active)   Wound Image   04/02/21 2300   Stomal Appliance Assessment Intact    Stoma Assessment Beefy red    Stoma Shape Round;Budded Less Than One Inch    Stoma Size (in) 1.25    Peristomal Assessment Clean;Dry;Intact    Mucocutaneous Junction Intact    Treatment Appliance Changed    Peristomal Protectant Paste Ring    Stomal Appliance Paste Ring, 2\";2 1/4\" (57mm) CTF    Output (mL) 100 mL    Output Color Yellow    WOUND RN ONLY - Stomal Appliance  2 Piece;Paste Ring, 2\";2 1/4\" Moldable;Transparent Pouch Lock & Roll    Appliance (Pouch) # [id number]    Appliance Brand Nallen         Ostomy Appliance (type and size): 2 1/4\" Formaflex barrier with pouch and Paste Ring,      Interventions and Education: Patient removed appliance. Explained how to use forma flex barrier and pt molded barrier to fit. Pt applied paste ring to barrier and then applied to skin. Py then attached " pouch to barrier, ostomy RN assisted with fully attaching pouch. Pt then closed pouch end.    Evaluation:   4/15: used formaflex (moldable) barrier today which pt prefers to use. Pt stated he needs another education session because he needs more practice. Ostomy RN will continue to meet with pt.    4/7/21:  Pt reports he has been burping pouch and is capable of changing appliance. Pt does have difficulty cutting barrier. May be able to transition to moldable at next change. Pt appears in better spirits today. Ostomy RN to remain available for education.     03/31/21: patient capable of changing ostomy but still having emotional difficulty with it.  Wound team will continue to follow 1-2 times weekly.  Patient not appropriate for surgical reversal at this time.         3/15/21:  Pt being transferred to T8 related to bacteremia and allergic reaction to zosyn overnight. Appliance working appropriately. Skin intact. Will plan to sign off and be available PRN.     3/11/21: Patient formed own formaflex barrier pouch and paste ring, assisted in cleansing. Feeling better and willing to continue to get more hands on. Likes the formaflex because he is actually able to form it to his size. Still has not performed independently.      3/8/21: Minimal participation, he attempts to do the steps with his eyes closed and minimal effort with his hands. Not sure if he lacks the fine motor movement or just going through the motions due to his expressed hatred of the colostomy. Patient has yet to perform all steps independently.     3/5/21: Pt did not participate in education today. Pt answered questions appropriately, but remained with his eyes closed through the duration of ostomy care. Declined hands on education when offered. Reported he was not feeling good. Formaflex barrier brought in today for pt as these do no require cutting with scissors. Extra barrier left in bag. CT of abd obtained yesterday did not reveal  obstruction.     3/1/21: Viable stoma, producing stool. Per CNA report at bedside, appliance was leaking earlier and CNA changed appliance. Patient observed all steps today and participated with hands on care and did well. Discussed with MD on possibility for reversal. Per surgery, reversal of ostomy is an outpatient procedure that he will have to follow-up with in the future after discharge     2/26/21: Pt very down today, reports that psych has not seen him. Pt kept turning head away from ostomy today but did verbalized he will try to do hands on Sunday.      2/24/21: Pt unable to transfer to inpatient psych until he can care for colostomy independently. Ostomy RNs were asked to return for education. Pt extremely depressed today. Pt states he feels like an animal and that the colostomy is messy and smelly and he would rather die then care for it. This RN provided understanding and attempted to explain that colostomy is manageable. 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 Pt had colostomy created on November 10, 2020 by Dr. Desouza. May want to consider contacting surgery for possible reversal related to pts extreme body dysmorphia (this is pts 2nd admit for SI related to colostomy) and inability for pt to DC due to pts inability/unwillingness to care for colostomy - Discussed with Dr. Ley ([facility] Resident).      Plan: Wound team to continue with teaching every 3-4 days to help teach patient how to care for appliance so that he can discharge to psych.     Anticipated discharge needs: patient would benefit from SNF, outpatient clinic or Home health for follow up care. Patient needs to follow up with Dr. Desouza after discharge to plan for ostomy reversal surgery.

## 2021-04-15 NOTE — PROGRESS NOTES
Assumed care of patient this am; AOx4. Patient denies SOB and pain at this time. Bed low and locked. Discussed plan of no self harm with patient. 1:1 sitter at bedside. discharge plan with patient. Will continue to monitor.

## 2021-04-15 NOTE — CARE PLAN
Problem: Safety  Goal: Will remain free from injury  Outcome: PROGRESSING AS EXPECTED  Goal: Will remain free from falls  Outcome: PROGRESSING AS EXPECTED  Note: Sitter at bedside, patient tells us when he needs help and has bed alarm on. Signage outside of door.     Problem: Skin Integrity  Goal: Risk for impaired skin integrity will decrease  Outcome: PROGRESSING AS EXPECTED  Intervention: Assess and monitor skin integrity, appearance and/or temperature  Note: Low air loss mattress in use, patient educated on Q2 turns-says he does it himself.

## 2021-04-15 NOTE — PROGRESS NOTES
2 RN skin check complete.   Devices in place Ostomy.  Skin assessed under devices Yes.  Confirmed pressure ulcers found on None.  New potential pressure ulcers noted on None. Wound consult placed ALready in place.  The following interventions in place Waffle and Meplex linens changed    Pt has healed pressure ulcer that was on the L thigh and coccyx, top of back has blanchable redness and heals are red but blanchable, 2nd toe on R foor had blanchable redness

## 2021-04-15 NOTE — PROGRESS NOTES
Assumed burt t 0645. Patient in bed, a&ox4. Reports continued SI. Sitter at bed side. RN encouraged patient to participate in changing colostomy appliance. This afternoon, patient able to verbalize correct technique. Patient stated he has difficulty cutting the wafer with scissors. RN assisted patient. No new issues.

## 2021-04-16 LAB — GLUCOSE BLD-MCNC: 108 MG/DL (ref 65–99)

## 2021-04-16 PROCEDURE — 700102 HCHG RX REV CODE 250 W/ 637 OVERRIDE(OP): Performed by: STUDENT IN AN ORGANIZED HEALTH CARE EDUCATION/TRAINING PROGRAM

## 2021-04-16 PROCEDURE — A9270 NON-COVERED ITEM OR SERVICE: HCPCS | Performed by: STUDENT IN AN ORGANIZED HEALTH CARE EDUCATION/TRAINING PROGRAM

## 2021-04-16 PROCEDURE — 82962 GLUCOSE BLOOD TEST: CPT

## 2021-04-16 PROCEDURE — A9270 NON-COVERED ITEM OR SERVICE: HCPCS | Performed by: NURSE PRACTITIONER

## 2021-04-16 PROCEDURE — 770001 HCHG ROOM/CARE - MED/SURG/GYN PRIV*

## 2021-04-16 PROCEDURE — 700102 HCHG RX REV CODE 250 W/ 637 OVERRIDE(OP): Performed by: NURSE PRACTITIONER

## 2021-04-16 PROCEDURE — 700102 HCHG RX REV CODE 250 W/ 637 OVERRIDE(OP): Performed by: HOSPITALIST

## 2021-04-16 PROCEDURE — A9270 NON-COVERED ITEM OR SERVICE: HCPCS | Performed by: HOSPITALIST

## 2021-04-16 RX ADMIN — DOCUSATE SODIUM 50 MG AND SENNOSIDES 8.6 MG 2 TABLET: 8.6; 5 TABLET, FILM COATED ORAL at 04:55

## 2021-04-16 RX ADMIN — MIDODRINE HYDROCHLORIDE 2.5 MG: 5 TABLET ORAL at 11:59

## 2021-04-16 RX ADMIN — ATORVASTATIN CALCIUM 40 MG: 40 TABLET, FILM COATED ORAL at 18:07

## 2021-04-16 RX ADMIN — CLONAZEPAM 0.5 MG: 0.5 TABLET ORAL at 08:14

## 2021-04-16 RX ADMIN — POTASSIUM CHLORIDE 20 MEQ: 20 TABLET, EXTENDED RELEASE ORAL at 04:56

## 2021-04-16 RX ADMIN — INSULIN GLARGINE 5 UNITS: 100 INJECTION, SOLUTION SUBCUTANEOUS at 05:00

## 2021-04-16 RX ADMIN — PAROXETINE HYDROCHLORIDE 20 MG: 20 TABLET, FILM COATED ORAL at 08:14

## 2021-04-16 RX ADMIN — SIMETHICONE 80 MG: 80 TABLET, CHEWABLE ORAL at 04:56

## 2021-04-16 RX ADMIN — SIMETHICONE 80 MG: 80 TABLET, CHEWABLE ORAL at 11:59

## 2021-04-16 RX ADMIN — DIGOXIN 125 MCG: 125 TABLET ORAL at 18:06

## 2021-04-16 RX ADMIN — ZIPRASIDONE HYDROCHLORIDE 60 MG: 60 CAPSULE ORAL at 08:14

## 2021-04-16 RX ADMIN — APIXABAN 5 MG: 5 TABLET, FILM COATED ORAL at 08:14

## 2021-04-16 RX ADMIN — APIXABAN 5 MG: 5 TABLET, FILM COATED ORAL at 22:19

## 2021-04-16 RX ADMIN — METOPROLOL SUCCINATE 25 MG: 25 TABLET, EXTENDED RELEASE ORAL at 04:56

## 2021-04-16 RX ADMIN — HYDROCORTISONE 10 MG: 10 TABLET ORAL at 04:56

## 2021-04-16 RX ADMIN — HYDROCORTISONE 5 MG: 10 TABLET ORAL at 18:05

## 2021-04-16 RX ADMIN — ZIPRASIDONE HYDROCHLORIDE 60 MG: 60 CAPSULE ORAL at 22:19

## 2021-04-16 RX ADMIN — SIMETHICONE 80 MG: 80 TABLET, CHEWABLE ORAL at 18:05

## 2021-04-16 RX ADMIN — FLUDROCORTISONE ACETATE 0.1 MG: 0.1 TABLET ORAL at 04:57

## 2021-04-16 RX ADMIN — LEVOTHYROXINE SODIUM 112 MCG: 0.11 TABLET ORAL at 04:55

## 2021-04-16 RX ADMIN — CLONAZEPAM 1 MG: 1 TABLET ORAL at 18:06

## 2021-04-16 RX ADMIN — MIDODRINE HYDROCHLORIDE 2.5 MG: 5 TABLET ORAL at 08:14

## 2021-04-16 RX ADMIN — MIDODRINE HYDROCHLORIDE 2.5 MG: 5 TABLET ORAL at 18:07

## 2021-04-16 ASSESSMENT — PAIN DESCRIPTION - PAIN TYPE: TYPE: ACUTE PAIN;CHRONIC PAIN

## 2021-04-16 NOTE — DISCHARGE PLANNING
Anticipated Discharge Disposition:   Inpatient Psych    Action:   Per chart review, pt is on Legal Hold, 6 month commitment. Pending inpatient psych acceptance.     Barriers to Discharge:   Inpatient Psych acceptance and bed availability.    Plan:   Hospital Care Management will continue to follow and assist with discharge planning needs.

## 2021-04-16 NOTE — PROGRESS NOTES
Received report from LUIS MANUEL RN. Assumed pt care at 1900. Pt having SI. 1:1 sitter bedside. No needs at this time. Medication given per MAR.

## 2021-04-16 NOTE — CARE PLAN
Problem: Safety  Goal: Will remain free from injury  Note: Patient will remain free from injury      Problem: Skin Integrity  Goal: Risk for impaired skin integrity will decrease  Intervention: Assess risk factors for impaired skin integrity and/or pressure ulcers  Note: Assessment of skin integrity

## 2021-04-16 NOTE — PROGRESS NOTES
Assumed care at 0645. Patient in bed, a&ox4. Continues to report SI. Orthostat Bps completed. Patient reporting dizziness when standing over 2 minutes.

## 2021-04-17 LAB — GLUCOSE BLD-MCNC: 139 MG/DL (ref 65–99)

## 2021-04-17 PROCEDURE — 700102 HCHG RX REV CODE 250 W/ 637 OVERRIDE(OP): Performed by: STUDENT IN AN ORGANIZED HEALTH CARE EDUCATION/TRAINING PROGRAM

## 2021-04-17 PROCEDURE — 700102 HCHG RX REV CODE 250 W/ 637 OVERRIDE(OP): Performed by: NURSE PRACTITIONER

## 2021-04-17 PROCEDURE — 99231 SBSQ HOSP IP/OBS SF/LOW 25: CPT | Performed by: NURSE PRACTITIONER

## 2021-04-17 PROCEDURE — A9270 NON-COVERED ITEM OR SERVICE: HCPCS | Performed by: NURSE PRACTITIONER

## 2021-04-17 PROCEDURE — A9270 NON-COVERED ITEM OR SERVICE: HCPCS | Performed by: STUDENT IN AN ORGANIZED HEALTH CARE EDUCATION/TRAINING PROGRAM

## 2021-04-17 PROCEDURE — A9270 NON-COVERED ITEM OR SERVICE: HCPCS | Performed by: HOSPITALIST

## 2021-04-17 PROCEDURE — 82962 GLUCOSE BLOOD TEST: CPT

## 2021-04-17 PROCEDURE — 90832 PSYTX W PT 30 MINUTES: CPT | Performed by: PSYCHOLOGIST

## 2021-04-17 PROCEDURE — 700102 HCHG RX REV CODE 250 W/ 637 OVERRIDE(OP): Performed by: HOSPITALIST

## 2021-04-17 PROCEDURE — 770001 HCHG ROOM/CARE - MED/SURG/GYN PRIV*

## 2021-04-17 RX ADMIN — INSULIN GLARGINE 5 UNITS: 100 INJECTION, SOLUTION SUBCUTANEOUS at 05:04

## 2021-04-17 RX ADMIN — LEVOTHYROXINE SODIUM 112 MCG: 0.11 TABLET ORAL at 05:04

## 2021-04-17 RX ADMIN — HYDROCORTISONE 10 MG: 10 TABLET ORAL at 05:04

## 2021-04-17 RX ADMIN — CLONAZEPAM 0.5 MG: 0.5 TABLET ORAL at 08:17

## 2021-04-17 RX ADMIN — SIMETHICONE 80 MG: 80 TABLET, CHEWABLE ORAL at 05:04

## 2021-04-17 RX ADMIN — APIXABAN 5 MG: 5 TABLET, FILM COATED ORAL at 20:26

## 2021-04-17 RX ADMIN — APIXABAN 5 MG: 5 TABLET, FILM COATED ORAL at 08:17

## 2021-04-17 RX ADMIN — ZIPRASIDONE HYDROCHLORIDE 60 MG: 60 CAPSULE ORAL at 20:26

## 2021-04-17 RX ADMIN — DIGOXIN 125 MCG: 125 TABLET ORAL at 17:16

## 2021-04-17 RX ADMIN — ATORVASTATIN CALCIUM 40 MG: 40 TABLET, FILM COATED ORAL at 17:15

## 2021-04-17 RX ADMIN — MIDODRINE HYDROCHLORIDE 2.5 MG: 5 TABLET ORAL at 12:12

## 2021-04-17 RX ADMIN — FLUDROCORTISONE ACETATE 0.1 MG: 0.1 TABLET ORAL at 05:04

## 2021-04-17 RX ADMIN — SIMETHICONE 80 MG: 80 TABLET, CHEWABLE ORAL at 17:17

## 2021-04-17 RX ADMIN — CLONAZEPAM 1 MG: 1 TABLET ORAL at 17:15

## 2021-04-17 RX ADMIN — MIDODRINE HYDROCHLORIDE 2.5 MG: 5 TABLET ORAL at 17:16

## 2021-04-17 RX ADMIN — SIMETHICONE 80 MG: 80 TABLET, CHEWABLE ORAL at 12:12

## 2021-04-17 RX ADMIN — MIDODRINE HYDROCHLORIDE 2.5 MG: 5 TABLET ORAL at 08:18

## 2021-04-17 RX ADMIN — POTASSIUM CHLORIDE 20 MEQ: 20 TABLET, EXTENDED RELEASE ORAL at 05:04

## 2021-04-17 RX ADMIN — METOPROLOL SUCCINATE 25 MG: 25 TABLET, EXTENDED RELEASE ORAL at 05:04

## 2021-04-17 RX ADMIN — PAROXETINE HYDROCHLORIDE 20 MG: 20 TABLET, FILM COATED ORAL at 08:17

## 2021-04-17 RX ADMIN — HYDROCORTISONE 5 MG: 10 TABLET ORAL at 17:16

## 2021-04-17 RX ADMIN — ZIPRASIDONE HYDROCHLORIDE 60 MG: 60 CAPSULE ORAL at 08:17

## 2021-04-17 RX ADMIN — DOCUSATE SODIUM 50 MG AND SENNOSIDES 8.6 MG 2 TABLET: 8.6; 5 TABLET, FILM COATED ORAL at 05:04

## 2021-04-17 ASSESSMENT — ENCOUNTER SYMPTOMS
INSOMNIA: 0
COUGH: 0
NERVOUS/ANXIOUS: 0
NAUSEA: 1
WEAKNESS: 0
CONSTIPATION: 0
HEADACHES: 0
PALPITATIONS: 0
SHORTNESS OF BREATH: 0
DIZZINESS: 1
DIARRHEA: 0
ABDOMINAL PAIN: 0
MYALGIAS: 0
VOMITING: 0
HALLUCINATIONS: 1
DEPRESSION: 1

## 2021-04-17 ASSESSMENT — PAIN DESCRIPTION - PAIN TYPE: TYPE: CHRONIC PAIN

## 2021-04-17 NOTE — CARE PLAN
Problem: Communication  Goal: The ability to communicate needs accurately and effectively will improve  Outcome: PROGRESSING AS EXPECTED     Problem: Safety  Goal: Will remain free from injury  Outcome: PROGRESSING AS EXPECTED  Note: 1:1 sitter, call bell within reach  Goal: Will remain free from falls  Outcome: PROGRESSING AS EXPECTED     Problem: Infection  Goal: Will remain free from infection  Outcome: PROGRESSING AS EXPECTED     Problem: Venous Thromboembolism (VTW)/Deep Vein Thrombosis (DVT) Prevention:  Goal: Patient will participate in Venous Thrombosis (VTE)/Deep Vein Thrombosis (DVT)Prevention Measures  Outcome: PROGRESSING AS EXPECTED     Problem: Bowel/Gastric:  Goal: Normal bowel function is maintained or improved  Outcome: PROGRESSING AS EXPECTED  Goal: Will not experience complications related to bowel motility  Outcome: PROGRESSING AS EXPECTED     Problem: Knowledge Deficit  Goal: Knowledge of disease process/condition, treatment plan, diagnostic tests, and medications will improve  Outcome: PROGRESSING AS EXPECTED  Goal: Knowledge of the prescribed therapeutic regimen will improve  Outcome: PROGRESSING AS EXPECTED     Problem: Discharge Barriers/Planning  Goal: Patient's continuum of care needs will be met  Outcome: PROGRESSING AS EXPECTED     Problem: Fluid Volume:  Goal: Will maintain balanced intake and output  Outcome: PROGRESSING AS EXPECTED     Problem: Psychosocial Needs:  Goal: Level of anxiety will decrease  Outcome: PROGRESSING AS EXPECTED  Note: Calm, asleep. Psych consulted     Problem: Mobility  Goal: Risk for activity intolerance will decrease  Outcome: PROGRESSING AS EXPECTED     Problem: Pain Management  Goal: Pain level will decrease to patient's comfort goal  Outcome: PROGRESSING AS EXPECTED     Problem: Skin Integrity  Goal: Risk for impaired skin integrity will decrease  Outcome: PROGRESSING AS EXPECTED     Problem: Potential for Self Harm  Goal: Achieve stable  functioning  Outcome: PROGRESSING AS EXPECTED  Goal: Abstinence  Outcome: PROGRESSING AS EXPECTED  Goal: No active self-harm  Outcome: PROGRESSING AS EXPECTED  Goal: Symptom reduction and improved functioning  Outcome: PROGRESSING AS EXPECTED  Goal: Effective symptom management  Outcome: PROGRESSING AS EXPECTED  Goal: Resolve acute exacerbation and return to psychiatric baseline  Outcome: PROGRESSING AS EXPECTED     Problem: Ineffective Coping  Goal: Achieve stable functioning  Outcome: PROGRESSING AS EXPECTED  Goal: Abstinence  Outcome: PROGRESSING AS EXPECTED  Goal: No active self-harm  Outcome: PROGRESSING AS EXPECTED  Goal: Symptom reduction and improved functioning  Outcome: PROGRESSING AS EXPECTED  Goal: Effective symptom management  Outcome: PROGRESSING AS EXPECTED  Goal: Resolve acute exacerbation and return to psychiatric baseline  Outcome: PROGRESSING AS EXPECTED     Problem: Respiratory:  Goal: Respiratory status will improve  Outcome: PROGRESSING AS EXPECTED     Problem: Urinary Elimination:  Goal: Ability to reestablish a normal urinary elimination pattern will improve  Outcome: PROGRESSING AS EXPECTED

## 2021-04-17 NOTE — PROGRESS NOTES
Assumed care at 0645. Patient in bed, pleasant and cooperative with care. Patient continues to report SI with plan of method. Sitter at bed side. No new issues. Will continue to monitor for additional needs.

## 2021-04-17 NOTE — CONSULTS
"PSYCHOLOGICAL FOLLOW-UP:  Reason for admission: Syncope and collapse [R55]  Suicidal ideation [R45.851]  Sepsis due to Klebsiella pneumoniae (HCC) [A41.4]  Length of Visit: 30min    Legal status: Legal Status: Involuntary    Chief Complaint: \"I want to live.\"    HPI: Met with the patient to assess risk level and crisis intervention. Patient presented with a brighter affect and reported a \"ok\" mood. He was laughing and smiling appropriately. The patient did not state suicidal thoughts with a plan during the session however he did endorse having suicidal thoughts this week. When asked how he coped with the thoughts, he stated that he was using the psychotherapy skills taught by this writer. When asked what he used, he stated that he was able to think about \"somewhere nice like the beach\" and he felt better. It should be noted that this is not one of the skills this writer has taught the patient. Reviewed mindfulness skills again. The patient also stated several times that he has been \"trying really hard\" to take care of his ostomy on his own and not listen to the voices in his head. When asked what motivates him to engage in his medical care and self-care activities like walking, he stated clearly, \"I want to live.\" He also shared his belief that there is more he has to do with his life. This writer restated that patient's intention to live and pointed out the behavior he has been engaging in that demonstrates his desire to live. The patient agreed with this writer's assessment. This writer informed the patient that she does not believe he needs inpatient psychiatric hospitalization anymore and that she would be discussing discontinuing the legal hold on Monday with the team psychiatrist. At this point, the patient's legs began to shake and he was able to state that he was shaking his legs because he was nervous about being discharged from the hospital. Explained that case management would still be working with him to " "identify a discharge plan and that this writer would continue to provide him crisis intervention services while he was in the hospital. This writer asked the patient what he thought of the plan and he stated, \"sounds like a good plan.\" He also stated his intention to continue engaging in his medical treatment.     Psychiatric Examination:  Vitals: Blood pressure 119/96, pulse 69, temperature 36.5 °C (97.7 °F), temperature source Temporal, resp. rate 18, height 1.829 m (6'), weight 118 kg (259 lb 8 oz), SpO2 93 %.  Musculoskeletal: normal psychomotor activity, no tics or unusual mannerisms noted  Appearance and Eye Contact: appropriate dress and grooming. Behavior is calm, cooperative,  appropriate eye contact  Attention/Alertness: Alert  Thought Process: Linear, Logical and Goal Directed    Thought Content: No psychotic processes noted  Speech: Clear with normal rate and rhythm  Mood: \"ok\"            Affect: brighter. Laughing and smiling.          SI/HI: Did not report, did not state he was suicidal today    Memory: Recent and remote memory appear somewhat intact. He does not seem to be carrying over the skills taught in psychotherapy.   Orientation: alert, oriented to person, place and time  Insight into symptoms: fair  Judgement into symptoms:fair    ASSESSMENT: While the patient continues to make suicidal statements that consist of various plans to commit suicide, his behavior suggests that he is no longer experiencing intent to commit suicide. He continues to engage in his medical care and make statements to this writer that he is trying to become confident with his ostomy care. Furthermore, today he stated clearly his desire to live and his belief that he has more that he needs to do in life. He is future focused on psychological and physiological recovery. Given the above stated information, will discuss the case with the team psychiatrist on Monday and start the process of discontinuing the 6 month court " commitment as he does not appear to meet criteria for a legal hold any longer.     In regards to the patient's apparent difficulty with retaining psychotherapy skills across sessions, it is unclear if this difficulty is related to a memory deficit and/or behavioral symptom. Nevertheless, his mood symptoms do appear to have improved in the context of brief crisis intervention sessions.     DSM5 Diagnostic Considerations:   Major Depressive Disorder, recurrent, severe, with psychotic features    PLAN:  Legal status: Involuntary; on a 6 month court commitment   1:1 sitter remains  No changes to legal hold restrictions  Anticipate F/U on Monday, 4/19/21  Records reviewed: yes  Discussed patient with other provider: yes, [facility] student and team psychiatrist   Will continue to follow  Thank you for the consult.    Madhuri Fernandez, Ph.D.

## 2021-04-17 NOTE — PROGRESS NOTES
Spiritual Care Note    Patient Information     Patient's Name: Sebastián Castro   MRN: [id number]    YOB: 1965   Age and Gender: 56 y.o. male   Service Area: [place] [address]   Room (and Bed): [place] [address]   Ethnicity or Nationality:     Primary Language: English   Yarsanism/Spiritual preference: Spiritism   Place of Residence: [place]   Family/Friends/Others Present: No   Clinical Team Present: Yes, sitter   Medical Diagnosis(-es)/Procedure(s): Syncope   Code Status: Full Code    Date of Admission: 2/9/2021   Length of Stay: 66 days        Spiritual Care Provider Information:  Name of Spiritual Care Provider: Ammy Hess  Title of Spiritual Care Provider: Associate   Phone Number: [phone]  E-mail: [email or web address]  Total time : 5 minutes    Encounter/Request Information  Encounter/Request Type   Visited With: Patient, Patient not available  Nature of the Visit: Initial, On shift  General Visit: Yes  Referral From/ Origin of Request: Epic nursing  Referral To: Other /SCP     Spiritual Assessment     Spiritual Care Encounters    Interacton/Conversation:  stopped by twice; both times, pt was asleep.  left card and will refer the pt to the colleague who normally covers this unit.    Plan: (Refer to colleague.)    Notes:

## 2021-04-17 NOTE — CARE PLAN
"Patient walked 50 feet down hallway. Reported feeling dizzy. RN walked patient back to room, sat in chair then requested to go back to bed, stating he didn't \"feel right\". . Patient is agreeable to sitting up in chair several times a day.   "

## 2021-04-18 PROCEDURE — A9270 NON-COVERED ITEM OR SERVICE: HCPCS | Performed by: STUDENT IN AN ORGANIZED HEALTH CARE EDUCATION/TRAINING PROGRAM

## 2021-04-18 PROCEDURE — A9270 NON-COVERED ITEM OR SERVICE: HCPCS | Performed by: NURSE PRACTITIONER

## 2021-04-18 PROCEDURE — 700102 HCHG RX REV CODE 250 W/ 637 OVERRIDE(OP): Performed by: HOSPITALIST

## 2021-04-18 PROCEDURE — 700102 HCHG RX REV CODE 250 W/ 637 OVERRIDE(OP): Performed by: STUDENT IN AN ORGANIZED HEALTH CARE EDUCATION/TRAINING PROGRAM

## 2021-04-18 PROCEDURE — 700102 HCHG RX REV CODE 250 W/ 637 OVERRIDE(OP): Performed by: NURSE PRACTITIONER

## 2021-04-18 PROCEDURE — 770001 HCHG ROOM/CARE - MED/SURG/GYN PRIV*

## 2021-04-18 PROCEDURE — A9270 NON-COVERED ITEM OR SERVICE: HCPCS | Performed by: HOSPITALIST

## 2021-04-18 RX ADMIN — POTASSIUM CHLORIDE 20 MEQ: 20 TABLET, EXTENDED RELEASE ORAL at 05:03

## 2021-04-18 RX ADMIN — FLUDROCORTISONE ACETATE 0.1 MG: 0.1 TABLET ORAL at 05:03

## 2021-04-18 RX ADMIN — SIMETHICONE 80 MG: 80 TABLET, CHEWABLE ORAL at 05:02

## 2021-04-18 RX ADMIN — METOPROLOL SUCCINATE 25 MG: 25 TABLET, EXTENDED RELEASE ORAL at 05:03

## 2021-04-18 RX ADMIN — SIMETHICONE 80 MG: 80 TABLET, CHEWABLE ORAL at 11:49

## 2021-04-18 RX ADMIN — SIMETHICONE 80 MG: 80 TABLET, CHEWABLE ORAL at 16:20

## 2021-04-18 RX ADMIN — DOCUSATE SODIUM 50 MG AND SENNOSIDES 8.6 MG 2 TABLET: 8.6; 5 TABLET, FILM COATED ORAL at 05:03

## 2021-04-18 RX ADMIN — CLONAZEPAM 0.5 MG: 0.5 TABLET ORAL at 08:36

## 2021-04-18 RX ADMIN — CLONAZEPAM 1 MG: 1 TABLET ORAL at 16:20

## 2021-04-18 RX ADMIN — ATORVASTATIN CALCIUM 40 MG: 40 TABLET, FILM COATED ORAL at 16:20

## 2021-04-18 RX ADMIN — ZIPRASIDONE HYDROCHLORIDE 60 MG: 60 CAPSULE ORAL at 20:18

## 2021-04-18 RX ADMIN — INSULIN GLARGINE 5 UNITS: 100 INJECTION, SOLUTION SUBCUTANEOUS at 08:37

## 2021-04-18 RX ADMIN — MIDODRINE HYDROCHLORIDE 2.5 MG: 5 TABLET ORAL at 11:49

## 2021-04-18 RX ADMIN — HYDROCORTISONE 5 MG: 10 TABLET ORAL at 16:20

## 2021-04-18 RX ADMIN — DIGOXIN 125 MCG: 125 TABLET ORAL at 16:20

## 2021-04-18 RX ADMIN — MIDODRINE HYDROCHLORIDE 2.5 MG: 5 TABLET ORAL at 16:24

## 2021-04-18 RX ADMIN — ZIPRASIDONE HYDROCHLORIDE 60 MG: 60 CAPSULE ORAL at 08:42

## 2021-04-18 RX ADMIN — LEVOTHYROXINE SODIUM 112 MCG: 0.11 TABLET ORAL at 05:03

## 2021-04-18 RX ADMIN — PAROXETINE HYDROCHLORIDE 20 MG: 20 TABLET, FILM COATED ORAL at 08:36

## 2021-04-18 RX ADMIN — APIXABAN 5 MG: 5 TABLET, FILM COATED ORAL at 20:19

## 2021-04-18 RX ADMIN — MIDODRINE HYDROCHLORIDE 2.5 MG: 5 TABLET ORAL at 08:37

## 2021-04-18 RX ADMIN — APIXABAN 5 MG: 5 TABLET, FILM COATED ORAL at 08:37

## 2021-04-18 RX ADMIN — HYDROCORTISONE 10 MG: 10 TABLET ORAL at 05:02

## 2021-04-18 NOTE — PROGRESS NOTES
NURSE ANTICOAGULATION VISIT    4/3/2019     Abdiel presents to the [facility] for management of his Coumadin therapy.  (Z51.81,  Z79.01) Encounter for monitoring Coumadin therapy  (primary encounter diagnosis)  Plan: INR - POINT OF CARE, ANTICOAGULANT MANAGEMENT         FOR PATIENT ON WARFARIN    (I35.9) Aortic valve disorder  Plan: INR - POINT OF CARE, ANTICOAGULANT MANAGEMENT         FOR PATIENT ON WARFARIN    (Z95.2) Status post mechanical aortic valve replacement  Plan: INR - POINT OF CARE, ANTICOAGULANT MANAGEMENT         FOR PATIENT ON WARFARIN     Status   Patient reports missing 2mg on 3/31/19. He denies taking extra doses of the anticoagulant. Patient is at his goal between 2.0-3.0.   Dietary   Patient denies changes to overall vitamin K intake. RN reviewed the importance of maintaining the same number of servings of vitamin K foods, on a weekly basis. Examples of these foods were also discussed. Abdiel verbalized understanding.  Medication   Patient denies medication changes including herbals, supplements, and over the counter medications.  Current dosing of Coumadin was verified, and patient has been taking it as prescribed. Per AAC guideline,  Abdiel was instructed to continue current dosing schedule, listed below.   Alcohol   Patient denies changes in alcohol consumption.  Patient is aware that alcohol has potential to alter INR results.   Review of Systems   SKIN: Denies any unusual bruising or bleeding concerns. No bruising or bleeding noted when the patient was in the clinic today.   PSYCHOLOGIC: Alert. Oriented x3 to person, place, and time. Patient is self-administering medication.   OTHER: Patient denies noting any blood in sclera, nose, mouth/ gums, urine or stool.  Plan of Care   Per AAC guideline, patient instructed to  continue current dosing schedule, listed below:  Anticoagulation Summary  As of 4/3/2019    INR goal:   2.0-3.0   TTR:   79.0 % (6.1  1:1 sitter at bedside     y)   INR used for dosin.6 (4/3/2019)   Warfarin maintenance plan:   10 mg (4 mg x 2.5) every Bee, Th; 8 mg (4 mg x 2) all other days   Weekly warfarin total:   60 mg   No change documented:   Lin Phillips RN   Plan last modified:   Irlanda Maier RN (2018)   Next INR check:   2019   Target end date:   Indefinite    Indications    Encounter for monitoring Coumadin therapy [Z51.81  Z79.01]  Status post mechanical aortic valve replacement [Z95.2]  Aortic valve disorders (Resolved) [I35.9]  Aortic valve disorder [I35.9]             Anticoagulation Episode Summary     INR check location:   [facility] Clinic    Preferred lab:       Send INR reminders to:   [facility] (OPEN ENROLLMENT[facility]    Comments:         Anticoagulation Care Providers     Provider Role Specialty Phone number    Santosh JOSE Lang DO Referring Cardiovascular Disease [phone]          Hard copy of dosing instructions was provided to the patient, as is documented in today's anticoagulation encounter. Education was given and all questions were answered to patient's satisfaction. Patient was recommended to return to anticoagulation clinic in ~1 week, r/t missed dose.  Patient scheduled in 2 weeks.  Abdiel was encouraged to call with any medication changes or other concerns. The patient was also advised to present to the nearest medical facility for any sudden onset shortness of breath, chest pain, redness, warmth, or swelling of any extremity, unusual bruising, or bleeding that indicates medical attention. Patient verbalized understanding.    On- site/ supervising provider today: Dr. Kelly.     Lin Phillips RN

## 2021-04-18 NOTE — PROGRESS NOTES
With patient’s permission (if able), completed daily phone call to designated support person, nita BUENO  Discussed patient condition and plan of care. All questions answered.  Follow up requested: None

## 2021-04-18 NOTE — CARE PLAN
Problem: Safety  Goal: Will remain free from falls  Outcome: PROGRESSING AS EXPECTED     Problem: Venous Thromboembolism (VTW)/Deep Vein Thrombosis (DVT) Prevention:  Goal: Patient will participate in Venous Thrombosis (VTE)/Deep Vein Thrombosis (DVT)Prevention Measures  Outcome: PROGRESSING AS EXPECTED     Problem: Psychosocial Needs:  Goal: Level of anxiety will decrease  Outcome: PROGRESSING AS EXPECTED     Problem: Potential for Self Harm  Goal: No active self-harm  Outcome: PROGRESSING AS EXPECTED     Problem: Ineffective Coping  Goal: No active self-harm  Outcome: PROGRESSING AS EXPECTED

## 2021-04-18 NOTE — PROGRESS NOTES
Hospital Medicine Twice Weekly Progress Note    Date of Service  04/17/21    Chief Complaint  Suicidal ideation    Hospital Course  Mr. Castro is a 56 year-old man with a past medical history significant for atrial fibrillation, secondary hypercoagulable state, diabetes mellitus complicated by diabetic neuropathy, chronic systolic CHF, colostomy placement, orthostatic hypotension (maintained on florinef), and dyslipidemia who presented to the ED on 2/9/2021 after he became dizzy and had a syncopal episode at home.  In the ED he was noted to be hypotensive and bradycardic. Cardiology was consulted and recommended stopping all AV sanket blocking medications.  He remained orthostatic during his hospitalization so midodrine was started.  An echocardiogram was obtained and showed mild mitral regurgitation and an EF of 60% which was improved from his previous echocardiogram obtained on 11/2020 (previously 35%).  CTAs of the head and neck were done and were unremarkable outside of distal bilateral ICA tortuosity.  Also during his hospitalization he expressed desire to kill himself.  He has a colostomy and has become overwhelmed by caring for it.  He was subsequently placed on a legal hold and psychiatry was consulted.  He is now pending [facility] placement at the 6-month db for maturity of his colostomy.    Interval Problem Update  Patient is lying bed, in no apparent distress. Sitter in room at bedside. Patient was pleasant and cooperative.  He endorses continuing to improve his ability to care for ostomy. He continues to need practice with fitting and changing the dressing. He has some intermittent nausea and dizziness on standing. The voices he hears are unchanged and at baseline. He denies urinary retention and any other problems.    -Continue midodrine to 2.5 TID, monitor with orthostatic blood pressures q shift.  May need to titrate dose back up  if orthostatic hypotension does not resolve.  -Ostomy site without  erythema, edema or drainage.  Healing well.  -Psychology and psychiatry following.  Reports improvement in suicidal ideation.  Possible discontinuation of 6-month court commitment on Monday.  Patient may no longer require inpatient psychiatric care.  -Patient is receptive to spiritual consult    Consultants/Specialty  Cardiology  Psychiatry  General surgery    Code Status  Full Code    Disposition  Pending [facility] placement, likely to happen in May at the maturation of his colostomy.    Review of Systems  Review of Systems   HENT: Negative for tinnitus.    Respiratory: Negative for cough and shortness of breath.    Cardiovascular: Positive for leg swelling. Negative for chest pain and palpitations.   Gastrointestinal: Positive for nausea. Negative for abdominal pain, constipation, diarrhea (Liquid stool present within his colostomy) and vomiting.   Genitourinary: Negative for dysuria, frequency and urgency.        No urinary retention   Musculoskeletal: Negative for myalgias.   Neurological: Positive for dizziness. Negative for weakness and headaches.   Psychiatric/Behavioral: Positive for depression, hallucinations (auditory) and suicidal ideas. The patient is not nervous/anxious and does not have insomnia.    All other systems reviewed and are negative.     Physical Exam  Temp:  [36.3 °C (97.3 °F)-36.5 °C (97.7 °F)] 36.5 °C (97.7 °F)  Pulse:  [] 118  Resp:  [17-18] 18  BP: ()/(50-96) 81/50  SpO2:  [93 %] 93 %    Physical Exam  Vitals and nursing note reviewed.   Constitutional:       General: He is awake. He is not in acute distress.     Appearance: Normal appearance. He is well-developed and overweight. He is not ill-appearing.   HENT:      Head: Normocephalic and atraumatic.      Mouth/Throat:      Lips: Pink.      Mouth: Mucous membranes are moist.   Eyes:      Conjunctiva/sclera: Conjunctivae normal.      Pupils: Pupils are equal, round, and reactive to light.   Cardiovascular:      Rate and Rhythm:  Normal rate. Rhythm irregularly irregular.      Pulses: Normal pulses.      Heart sounds: Normal heart sounds.   Pulmonary:      Effort: Pulmonary effort is normal.      Breath sounds: Normal breath sounds.   Abdominal:      General: Bowel sounds are normal. There is no distension or abdominal bruit.      Palpations: Abdomen is soft.      Tenderness: There is no abdominal tenderness.       Musculoskeletal:      Cervical back: Normal range of motion and neck supple.      Right lower leg: Edema (stable) present.      Left lower leg: Edema (stable) present.   Skin:     General: Skin is warm and dry.      Coloration: Skin is pale.   Neurological:      General: No focal deficit present.      Mental Status: He is alert and oriented to person, place, and time.   Psychiatric:         Attention and Perception: Attention and perception normal.         Mood and Affect: Affect normal. Mood is depressed. Affect is not angry or tearful.         Speech: Speech normal.         Behavior: Behavior normal. Behavior is cooperative.         Thought Content: Thought content includes suicidal ideation. Thought content includes suicidal plan.         Cognition and Memory: Memory normal.     Fluids    Intake/Output Summary (Last 24 hours) at 4/17/2021 1708  Last data filed at 4/17/2021 1000  Gross per 24 hour   Intake 50 ml   Output 1150 ml   Net -1100 ml     Laboratory          Imaging  [id number] VIEW   Final Result         No significant interval change.      DX-CHEST-LIMITED (1 VIEW)   Final Result         Diffuse interstitial prominence could relate to mild pulmonary edema or atypical infection      [id number] VIEW   Final Result      Increased colonic gas without definite bowel obstruction.      US-RENAL   Final Result      No evidence of hydronephrosis.      Lobulated kidneys bilaterally.      CT-ABDOMEN-PELVIS WITH   Final Result      1.  There is no evidence of small bowel obstruction.   2.  There is a left lower quadrant  ostomy.   3.  There is fluid distention of the colon distal to the surgical material in the left mid descending colon extending all the way to the rectum. There is no pneumatosis or free air.   4.  There is cholelithiasis without biliary dilatation.   5.  There has been interval removal of the drainage catheter anterior to the spleen with minimal hypodense area in the anterior spleen again noted. There is no new fluid collection.      IR-US GUIDED PIV   Final Result    Ultrasound-guided PERIPHERAL IV INSERTION performed by    qualified nursing staff as above.      EC-ECHOCARDIOGRAM COMPLETE W/O CONT   Final Result      DX-LUMBAR SPINE-2 OR 3 VIEWS   Final Result      Moderate compression deformity of T11 is new compared to 2018.      Mild wedge deformity of T12 is unchanged.      Degenerative changes including facet arthropathy.      Mild retrolisthesis of L5 on S1 and L3 on L4.            Assessment/Plan  * Suicidal ideation- (present on admission)  Assessment & Plan  -Has history of MDD, recurrent with psychotic features. active SI w/ plan on admission  -Currently on legal hold.  -Psychiatry following, appreciate assistance.  Medication per their discretion.  -Pending placement to [facility].   -Continue suicide precautions and sitter at bedside.    Orthostatic hypotension- (present on admission)  Assessment & Plan  -Multifactorial including adrenal insufficiency, medications (beta blocker, CCB, tamsulosin), diabetic autonomic dysfunction, and venous insufficiency.  -EF is noted to be 60%, significant improvement from prior (previously 35%).  -Per cardiology, no further cardiac work-up needed.  Recommends follow-up with them as outpatient.  -Fall precautions.  -Continue to educate on the importance of slow positional changes.   - Started PO hydrocortisone 4/7/2021 for AI. Continuing florinef.   - Recheck orthostatics BPs   - Decreased midodrine to 2.5mg in attempt to wean, may need to titrate back up if orthostatic  hypotension persists.    Paroxysmal atrial fibrillation (HCC)- (present on admission)  Assessment & Plan  -CHADSVASC 3 (HTN, CHF, DM).   -TTE Feb 2021: EF 60%.  -Continue metoprolol, digoxin, and eliquis.    Chronic heart failure with preserved ejection fraction (HCC)- (present on admission)  Assessment & Plan  -Now stable.  -Pulmonary edema noted in mid-March, was placed on IV diuresis though that was held when he developed a gram-negative gina bacteremia and sepsis.  Currently doing well from a respiratory standpoint, no shortness of breath, lungs clear to auscultation bilaterally, not requiring oxygen supplementation.  -Repeat echocardiogram showed improvement in his ejection fraction to 60%.    -Continue metoprolol succinate.  -Lisinopril held per cardiology recommendations due to hypotension.    Diabetes mellitus type 2, insulin dependent (HCC)- (present on admission)  Assessment & Plan  -A1c 5.2% on 2/14/2021.  -Continue diabetic diet.  -Continue lantus at current dose with SSI as required.  -Hypoglycemia protocol in place if needed.  -Discontinued SSI: blood glucose well controlled on current regimen.    Major depressive disorder, recurrent, severe with psychotic features (HCC)- (present on admission)  Assessment & Plan  -Management as listed under 'anxiety and depression'.    Anxiety and depression- (present on admission)  Assessment & Plan  -Legal hold extended, awaiting acceptance at psych facility ([facility] will take in May).  -Remains suicidal.   -Psychiatry following.  -Continue paxil, klonopin, & geodon.     Hypothyroidism- (present on admission)  Assessment & Plan  -TFTs in Feb were WNL. Continue levothyroxine at current dose.     Adrenal insufficiency (HCC)- (present on admission)  Assessment & Plan  -Started PO hydrocortisone 4/7/2021. Continue florinef.   -Needs outpatient endocrinology follow up.  -Monitor potassium while on florinef. 4.4 on 4/7/2021.  -Repeat orthostatics periodically if symptomatic         -Recheck orthostatics BPs to monitor midodrine weaning     Obstructive uropathy- (present on admission)  Assessment & Plan  -Trial off of flomax given his hypotension.  Discontinued 4/6/2021. No retention thus far.     Chronic venous insufficiency of lower extremity- (present on admission)  Assessment & Plan  -Continue compression stockings.    Colostomy present (HCC)- (present on admission)  Assessment & Plan  -History of fall at home Nov 2020 on left side with perforated colon and splenic fluid collection/hematoma s/p segmental colectomy, colostomy, mobilization of the splenic flexure, wound vac, and I&D Nov 2020.  -Wound care consulted for ostomy care, but he was non-participatory. He is now engaging in ostomy care with his bedside RNs, encouraged to be observant but do it independently.  -Patient must be independently able to care for his ostomy before inpatient psychiatry will accept him.  Per surgery Dr. Desouza, ostomy reversal is high risk given patient's complexities. Plan for close multidisciplinary outpatient follow-up for surgery when patients overall medical conditions are more optimized.    Debility- (present on admission)  Assessment & Plan  -Cleared by PT/OT.  -Out of bed for each meal and ambulate in the hallways at least twice per shift.    Mixed hyperlipidemia- (present on admission)  Assessment & Plan  Lab Results   Component Value Date/Time    CHOLSTRLTOT 146 02/14/2021 01:30 AM    LDL 82 02/14/2021 01:30 AM    HDL 32 (A) 02/14/2021 01:30 AM    TRIGLYCERIDE 160 (H) 02/14/2021 01:30 AM   -Continue atorvastatin.     Lower limb amputation, great toe (HCC)- (present on admission)  Assessment & Plan  -History of, 2018.  Site well-healed.    Class 2 severe obesity with serious comorbidity and body mass index (BMI) of 35.0 to 35.9 in adult (HCC)- (present on admission)  Assessment & Plan  Body mass index is 35.19 kg/m².-Trending down.  -Could benefit from outpatient weight loss counseling which  can be arranged through his PCP after hospital discharge.       VTE prophylaxis: CRYSTAL Zamora.

## 2021-04-19 PROCEDURE — 770001 HCHG ROOM/CARE - MED/SURG/GYN PRIV*

## 2021-04-19 PROCEDURE — A9270 NON-COVERED ITEM OR SERVICE: HCPCS | Performed by: STUDENT IN AN ORGANIZED HEALTH CARE EDUCATION/TRAINING PROGRAM

## 2021-04-19 PROCEDURE — 700102 HCHG RX REV CODE 250 W/ 637 OVERRIDE(OP): Performed by: STUDENT IN AN ORGANIZED HEALTH CARE EDUCATION/TRAINING PROGRAM

## 2021-04-19 PROCEDURE — 700102 HCHG RX REV CODE 250 W/ 637 OVERRIDE(OP): Performed by: NURSE PRACTITIONER

## 2021-04-19 PROCEDURE — 700102 HCHG RX REV CODE 250 W/ 637 OVERRIDE(OP): Performed by: HOSPITALIST

## 2021-04-19 PROCEDURE — A9270 NON-COVERED ITEM OR SERVICE: HCPCS | Performed by: NURSE PRACTITIONER

## 2021-04-19 PROCEDURE — 90832 PSYTX W PT 30 MINUTES: CPT | Performed by: PSYCHOLOGIST

## 2021-04-19 PROCEDURE — A9270 NON-COVERED ITEM OR SERVICE: HCPCS | Performed by: HOSPITALIST

## 2021-04-19 PROCEDURE — 99232 SBSQ HOSP IP/OBS MODERATE 35: CPT | Performed by: PSYCHIATRY & NEUROLOGY

## 2021-04-19 RX ADMIN — CLONAZEPAM 0.5 MG: 0.5 TABLET ORAL at 08:47

## 2021-04-19 RX ADMIN — HYDROCORTISONE 10 MG: 10 TABLET ORAL at 06:19

## 2021-04-19 RX ADMIN — METOPROLOL SUCCINATE 25 MG: 25 TABLET, EXTENDED RELEASE ORAL at 06:19

## 2021-04-19 RX ADMIN — APIXABAN 5 MG: 5 TABLET, FILM COATED ORAL at 20:50

## 2021-04-19 RX ADMIN — MIDODRINE HYDROCHLORIDE 2.5 MG: 5 TABLET ORAL at 11:47

## 2021-04-19 RX ADMIN — ATORVASTATIN CALCIUM 40 MG: 40 TABLET, FILM COATED ORAL at 16:46

## 2021-04-19 RX ADMIN — APIXABAN 5 MG: 5 TABLET, FILM COATED ORAL at 08:46

## 2021-04-19 RX ADMIN — DIGOXIN 125 MCG: 125 TABLET ORAL at 16:47

## 2021-04-19 RX ADMIN — SIMETHICONE 80 MG: 80 TABLET, CHEWABLE ORAL at 11:47

## 2021-04-19 RX ADMIN — FLUDROCORTISONE ACETATE 0.1 MG: 0.1 TABLET ORAL at 06:20

## 2021-04-19 RX ADMIN — MIDODRINE HYDROCHLORIDE 2.5 MG: 5 TABLET ORAL at 08:47

## 2021-04-19 RX ADMIN — SIMETHICONE 80 MG: 80 TABLET, CHEWABLE ORAL at 16:47

## 2021-04-19 RX ADMIN — ZIPRASIDONE HYDROCHLORIDE 60 MG: 60 CAPSULE ORAL at 08:46

## 2021-04-19 RX ADMIN — SIMETHICONE 80 MG: 80 TABLET, CHEWABLE ORAL at 06:19

## 2021-04-19 RX ADMIN — ZIPRASIDONE HYDROCHLORIDE 60 MG: 60 CAPSULE ORAL at 20:50

## 2021-04-19 RX ADMIN — MIDODRINE HYDROCHLORIDE 2.5 MG: 5 TABLET ORAL at 16:47

## 2021-04-19 RX ADMIN — INSULIN GLARGINE 5 UNITS: 100 INJECTION, SOLUTION SUBCUTANEOUS at 06:20

## 2021-04-19 RX ADMIN — HYDROCORTISONE 5 MG: 10 TABLET ORAL at 16:47

## 2021-04-19 RX ADMIN — CLONAZEPAM 1 MG: 1 TABLET ORAL at 16:46

## 2021-04-19 RX ADMIN — LEVOTHYROXINE SODIUM 112 MCG: 0.11 TABLET ORAL at 06:20

## 2021-04-19 RX ADMIN — POTASSIUM CHLORIDE 20 MEQ: 20 TABLET, EXTENDED RELEASE ORAL at 06:19

## 2021-04-19 RX ADMIN — PAROXETINE HYDROCHLORIDE 20 MG: 20 TABLET, FILM COATED ORAL at 08:46

## 2021-04-19 NOTE — CARE PLAN
Problem: Communication  Goal: The ability to communicate needs accurately and effectively will improve  Outcome: PROGRESSING AS EXPECTED  Note: Pt has been educated on importance for appropriately calling for assistance or needs with call light, any questions/concerns answered at this time. Call light close by, will check on patient hourly.       Problem: Safety  Goal: Will remain free from injury  Outcome: PROGRESSING AS EXPECTED  Note: Discussed patient mobility status with interdisciplinary team, fall precautions in place, pt fall prevention education done, pt educated to call for assistance when needed.     Goal: Will remain free from falls  Outcome: PROGRESSING AS EXPECTED     Problem: Bowel/Gastric:  Goal: Normal bowel function is maintained or improved  Outcome: PROGRESSING AS EXPECTED  Goal: Will not experience complications related to bowel motility  Outcome: PROGRESSING AS EXPECTED     Problem: Knowledge Deficit  Goal: Knowledge of disease process/condition, treatment plan, diagnostic tests, and medications will improve  Outcome: PROGRESSING AS EXPECTED  Note: Pt educated on current POC, expected outcomes and goals, and new medications ordered. All questions and concerns have been answered at this time.    Goal: Knowledge of the prescribed therapeutic regimen will improve  Outcome: PROGRESSING AS EXPECTED     Problem: Discharge Barriers/Planning  Goal: Patient's continuum of care needs will be met  Outcome: PROGRESSING SLOWER THAN EXPECTED     Problem: Mobility  Goal: Risk for activity intolerance will decrease  Outcome: PROGRESSING AS EXPECTED  Note: Discussed plan to mobilize, active range of motion with resistance, educated on benefits of mobilization, risks of activity intolerance.       Problem: Skin Integrity  Goal: Risk for impaired skin integrity will decrease  Outcome: PROGRESSING AS EXPECTED

## 2021-04-19 NOTE — PROGRESS NOTES
With patient’s permission (if able), completed daily phone call to designated support person, name   Discussed patient condition and plan of care. All questions answered.  Follow up requested: None

## 2021-04-19 NOTE — CONSULTS
"PSYCHOLOGICAL FOLLOW-UP:  Reason for admission: Syncope and collapse [R55]  Suicidal ideation [R45.851]  Sepsis due to Klebsiella pneumoniae (HCC) [A41.4]  Length of Visit: 30min    Legal status: Legal Status: Involuntary    Chief Complaint: \"I want to live.\"    HPI: Met with the patient to assess risk level and provide crisis intervention services. Patient presented with a euthymic affect and reported a \"blah'\" mood. He was able to laugh and smile appropriately. He stated that his \"blah\" mood was associated with his blood pressure. Reminded the patient to \"ride the wave\" of the emotion, remembering that he could feel different later today. He then stated, \"I could feel good tomorrow morning!\" He did not report any suicidal ideations. He was future focused on continuing to learn how to manage his ostomy on his own and engage in physical activity like walking which he finds pleasurable. When asked what motivates him in his psychological and physiological recovery, he stated, \"I am strong. I want to live.\" Informed the patient that I did not believe he needed inpatient psychiatric care anymore and the patient agreed. Informed him that I would be recommending the discontinuation of his 6 month court commitment and the patient stated his understanding. He asked about his discharge plan and I directed him to speak with his primary team including case management about their recommendations.     Psychiatric Examination:  Vitals: Blood pressure (!) 84/44, pulse 80, temperature 36.2 °C (97.2 °F), temperature source Temporal, resp. rate 20, height 1.829 m (6'), weight 118 kg (259 lb 8 oz), SpO2 92 %.  Musculoskeletal: normal psychomotor activity, no tics or unusual mannerisms noted  Appearance and Eye Contact: appropriate dress and grooming. Behavior is calm, cooperative,  appropriate eye contact  Attention/Alertness: Alert  Thought Process: Linear, Logical and Goal Directed    Thought Content: No psychotic processes " "noted  Speech: Clear with normal rate and rhythm  Mood: \"blah\"            Affect: euthymic. Able to laugh and smile appropriately          SI/HI: Did not report, is future focused on psychological and physiological recovery    Memory: Recent and remote memory appear intact    Orientation: alert, oriented to person, place and time  Insight into symptoms: good  Judgement into symptoms:good    ASSESSMENT: The patient continues to present with a euthymic affect and state a desire to live. He is engaging in his ostomy care and is future focused on continuing to do so. He speaks about the future and the possibility of it being better indicating he is not currently hopeless. Given the above stated information, the patient no longer meets criteria for a legal hold and does not need inpatient psychiatric treatment. Recommend discontinuing the 6 month court commitment. Recommend case management speak with the patient's payee to aid in discharge. He has lived in group home settings before.     DSM5 Diagnostic Considerations:   Major Depressive Disorder, recurrent, severe, with psychotic features     PLAN:  Legal status: Recommend discontinuing the 6 month court commitment.   Anticipate F/U when back on he service on 4/30/21 if he is still in the acute care setting.   Records reviewed: yes  Discussed patient with other provider: yes, tem psychiatrist   Will continue to follow while he is in the acute care setting   Thank you for the consult.    Madhuri Fernandez, Ph.D.      "

## 2021-04-19 NOTE — CARE PLAN
Problem: Safety  Goal: Will remain free from falls  Outcome: PROGRESSING AS EXPECTED     Problem: Venous Thromboembolism (VTW)/Deep Vein Thrombosis (DVT) Prevention:  Goal: Patient will participate in Venous Thrombosis (VTE)/Deep Vein Thrombosis (DVT)Prevention Measures  Outcome: PROGRESSING AS EXPECTED     Problem: Potential for Self Harm  Goal: No active self-harm  Outcome: PROGRESSING AS EXPECTED     Problem: Ineffective Coping  Goal: No active self-harm  Outcome: PROGRESSING AS EXPECTED

## 2021-04-19 NOTE — CONSULTS
"PSYCHIATRIC FOLLOW-UP:(established)  *Reason for admission: Syncope, hypotension       *Legal Hold Status:   Court commitment for 6 months         Chart reviewed.  Patient was seen with Dr. Fernandez.        *HPI: Patient reported his mood is \" blah\" due to having some low blood pressure today.  Dr. Fernandez reminded those feelings could be temporary, to which patient agreed with.  He shared that he was able to change his ostomy bag this weekend.  We discussed our plan of requesting discontinuation of his court commitment today, as we believe he does not need inpatient psychiatric treatment at this time.  Patient conveyed understanding and agreed with our plan.  And in fact, he stated \" I am strong.  I want to live\".  Patient asked about plan for disposition, which we will defer to treating medical team. Patient had no concerns or questions about his current medication regimen.                *Psychiatric Examination:  Vitals:   Vitals:    04/19/21 1103   BP: (!) 84/44   Pulse: 80   Resp:    Temp:    SpO2:        General Appearance: Lying down in bed, calm and cooperative with good eye contact.  Fair hygiene and grooming  Abnormal Movements: None  Gait and Posture: Normal lying bed  Speech: Normal  Thought processes: Linear and goal oriented  Associations: No loose association  Abnormal or Psychotic Thoughts: No psychosis endorsed or observed  Judgement and Insight: Fair/fair  Orientation: Grossly oriented  Recent and Remote Memory: Appears intact  Attention Span and Concentration: Intact  Language: Fluid  Fund of Knowledge: Not tested  Mood and Affect:\" Blah\", constricted but reactive  SI/HI: Denies         *Labs personally reviewed:   Recent Results (from the past 72 hour(s))   POCT glucose device results    Collection Time: 04/17/21 10:45 AM   Result Value Ref Range    Glucose - Accu-Ck 139 (H) 65 - 99 mg/dL         Assessment: Case discussed in person with Dr. Fernandez.  We both agreed that patient does not require " inpatient psychiatric hospitalization at this time.  His behavior has showed that he cares about his safety and his survival.  He also has verbalized that he wants to live.  Patient has been accepting all medical care during this admission, and has not made any suicide attempts.  He does have chronic passive suicidal ideations, but at least for the past week he has been future oriented and has not endorsed any active suicidal thoughts, intention or plan of harming himself.  In my opinion, patient has a low acute risk of danger to himself at this time and does not require higher level of psychiatric care in an inpatient setting.  Patient will benefit from outpatient psychotherapy treatment once he is discharged.  He also should follow-up with a psychiatrist to continue medication management.      Dx:  MDD, recurrent, severe, with psychotic features, stable          Plan:  1- Legal hold: Discontinued.   2- Psychotropic medications: Continue current regimen  3-I am requesting  to look into discharge planning/placement for patient.  He does not require inpatient psychiatric hospitalization anymore.  Please, also ensure that patient has outpatient psychiatric follow-up appointment upon discharge.  4-  Discussed the case with: Dr. Fernandez  5- Psychiatry signing off.    Thank you for the consult.     Sitter: No  Phone, personal belongings and visitors: No      This note was created using voice recognition software (Dragon). The accuracy of the dictation is limited by the abilities of the software. I have reviewed the note prior to signing. However, error related to voice recognition software and /or scribes may still exist and should be interpreted within the appropriate context.

## 2021-04-19 NOTE — CARE PLAN
Problem: Communication  Goal: The ability to communicate needs accurately and effectively will improve  Outcome: PROGRESSING AS EXPECTED  Note: Pt has been educated on importance for appropriately calling for assistance or needs with call light, any questions/concerns answered at this time. Call light close by, will check on patient hourly.       Problem: Safety  Goal: Will remain free from injury  Outcome: PROGRESSING AS EXPECTED  Note: Discussed patient mobility status with interdisciplinary team, fall precautions in place, pt fall prevention education done, pt educated to call for assistance when needed.     Goal: Will remain free from falls  Outcome: PROGRESSING AS EXPECTED     Problem: Infection  Goal: Will remain free from infection  Outcome: PROGRESSING AS EXPECTED     Problem: Venous Thromboembolism (VTW)/Deep Vein Thrombosis (DVT) Prevention:  Goal: Patient will participate in Venous Thrombosis (VTE)/Deep Vein Thrombosis (DVT)Prevention Measures  Outcome: PROGRESSING AS EXPECTED     Problem: Bowel/Gastric:  Goal: Normal bowel function is maintained or improved  Outcome: PROGRESSING AS EXPECTED  Goal: Will not experience complications related to bowel motility  Outcome: PROGRESSING AS EXPECTED     Problem: Knowledge Deficit  Goal: Knowledge of disease process/condition, treatment plan, diagnostic tests, and medications will improve  Outcome: PROGRESSING AS EXPECTED  Goal: Knowledge of the prescribed therapeutic regimen will improve  Outcome: PROGRESSING AS EXPECTED

## 2021-04-20 PROCEDURE — A9270 NON-COVERED ITEM OR SERVICE: HCPCS | Performed by: STUDENT IN AN ORGANIZED HEALTH CARE EDUCATION/TRAINING PROGRAM

## 2021-04-20 PROCEDURE — A9270 NON-COVERED ITEM OR SERVICE: HCPCS | Performed by: NURSE PRACTITIONER

## 2021-04-20 PROCEDURE — 700102 HCHG RX REV CODE 250 W/ 637 OVERRIDE(OP): Performed by: STUDENT IN AN ORGANIZED HEALTH CARE EDUCATION/TRAINING PROGRAM

## 2021-04-20 PROCEDURE — 99231 SBSQ HOSP IP/OBS SF/LOW 25: CPT | Performed by: NURSE PRACTITIONER

## 2021-04-20 PROCEDURE — 700102 HCHG RX REV CODE 250 W/ 637 OVERRIDE(OP): Performed by: NURSE PRACTITIONER

## 2021-04-20 PROCEDURE — A9270 NON-COVERED ITEM OR SERVICE: HCPCS | Performed by: HOSPITALIST

## 2021-04-20 PROCEDURE — 700102 HCHG RX REV CODE 250 W/ 637 OVERRIDE(OP): Performed by: HOSPITALIST

## 2021-04-20 PROCEDURE — 770001 HCHG ROOM/CARE - MED/SURG/GYN PRIV*

## 2021-04-20 RX ADMIN — INSULIN GLARGINE 5 UNITS: 100 INJECTION, SOLUTION SUBCUTANEOUS at 06:00

## 2021-04-20 RX ADMIN — CLONAZEPAM 1 MG: 1 TABLET ORAL at 16:42

## 2021-04-20 RX ADMIN — CLONAZEPAM 0.5 MG: 0.5 TABLET ORAL at 07:33

## 2021-04-20 RX ADMIN — MIDODRINE HYDROCHLORIDE 2.5 MG: 5 TABLET ORAL at 07:33

## 2021-04-20 RX ADMIN — PAROXETINE HYDROCHLORIDE 20 MG: 20 TABLET, FILM COATED ORAL at 07:33

## 2021-04-20 RX ADMIN — APIXABAN 5 MG: 5 TABLET, FILM COATED ORAL at 19:25

## 2021-04-20 RX ADMIN — ATORVASTATIN CALCIUM 40 MG: 40 TABLET, FILM COATED ORAL at 16:43

## 2021-04-20 RX ADMIN — MIDODRINE HYDROCHLORIDE 2.5 MG: 5 TABLET ORAL at 11:08

## 2021-04-20 RX ADMIN — HYDROCORTISONE 10 MG: 10 TABLET ORAL at 06:01

## 2021-04-20 RX ADMIN — METOPROLOL SUCCINATE 25 MG: 25 TABLET, EXTENDED RELEASE ORAL at 06:01

## 2021-04-20 RX ADMIN — FLUDROCORTISONE ACETATE 0.1 MG: 0.1 TABLET ORAL at 06:00

## 2021-04-20 RX ADMIN — SIMETHICONE 80 MG: 80 TABLET, CHEWABLE ORAL at 16:42

## 2021-04-20 RX ADMIN — SIMETHICONE 80 MG: 80 TABLET, CHEWABLE ORAL at 11:08

## 2021-04-20 RX ADMIN — DIGOXIN 125 MCG: 125 TABLET ORAL at 16:43

## 2021-04-20 RX ADMIN — HYDROCORTISONE 5 MG: 10 TABLET ORAL at 16:43

## 2021-04-20 RX ADMIN — ZIPRASIDONE HYDROCHLORIDE 60 MG: 60 CAPSULE ORAL at 19:25

## 2021-04-20 RX ADMIN — POTASSIUM CHLORIDE 20 MEQ: 20 TABLET, EXTENDED RELEASE ORAL at 06:00

## 2021-04-20 RX ADMIN — MIDODRINE HYDROCHLORIDE 2.5 MG: 5 TABLET ORAL at 16:43

## 2021-04-20 RX ADMIN — ZIPRASIDONE HYDROCHLORIDE 60 MG: 60 CAPSULE ORAL at 07:33

## 2021-04-20 RX ADMIN — SIMETHICONE 80 MG: 80 TABLET, CHEWABLE ORAL at 06:00

## 2021-04-20 RX ADMIN — LEVOTHYROXINE SODIUM 112 MCG: 0.11 TABLET ORAL at 06:00

## 2021-04-20 RX ADMIN — DOCUSATE SODIUM 50 MG AND SENNOSIDES 8.6 MG 2 TABLET: 8.6; 5 TABLET, FILM COATED ORAL at 06:00

## 2021-04-20 RX ADMIN — APIXABAN 5 MG: 5 TABLET, FILM COATED ORAL at 07:33

## 2021-04-20 ASSESSMENT — ENCOUNTER SYMPTOMS
NAUSEA: 0
MYALGIAS: 0
SORE THROAT: 0
HEADACHES: 0
ABDOMINAL PAIN: 0
INSOMNIA: 0
SENSORY CHANGE: 0
WEAKNESS: 0
DIARRHEA: 0
EYE PAIN: 0
DIZZINESS: 0
SHORTNESS OF BREATH: 0
CONSTIPATION: 0
EYE DISCHARGE: 0
COUGH: 0
FEVER: 0
NERVOUS/ANXIOUS: 1
VOMITING: 0
ROS GI COMMENTS: COLOSTOMY IN PLACE
PALPITATIONS: 0
FOCAL WEAKNESS: 0
SPEECH CHANGE: 0
DEPRESSION: 1

## 2021-04-20 NOTE — DISCHARGE PLANNING
Dr. Berger and Dr. Fernandez requesting to have 6 month commitment discontinued. Filed notice of patient discharge to the court via [employer]. Once verified notice of patient discharge is received from the court will send copy to ELISA Sutton. Pt does not have to wait 10 days for commitment to be released, pt can discharge as soon as court document is received back from the court.  Notified ELISA Sutton.    Received verified Notice of Patient Discharge from the court, sent copy to ELISA Sutton. Scanned copy of notice into pt's chart.  Removed pt from commitment as of today at 0944.

## 2021-04-20 NOTE — PROGRESS NOTES
"Pt reports having \"bad thoughts\", asked for pt to clarify and pt stated he wants to hurt himself, asked if the patient had the opportunity to hurt himself would he do it and the patient said \"yes\", explored reasons why he was having bad thoughts and pt stated it was due to stress and anxiety related to the changes happening today such as legal hold being discontinued and figuring out what discharge will look like. Had CNA stay with patient, RN left room to update charge nurse and contacted the attending PCP for instructions. Charge RN escalated issue, contacted psych and PCP, per charge RN pt to be left off of SI precautions as instructed by psych and psych to put in/update note regarding incident.  "

## 2021-04-20 NOTE — DISCHARGE PLANNING
Anticipated Discharge Disposition:   TBD    Action:   Per chart review, 6 month commitment was discontinued. Received court paperwork from [employer]. Copy placed on pt's physical chart. Charge and bedside RN updated.    Barriers to Discharge:   Medical clearance.  Placement acceptance and bed availability.    Plan:   Hospital Care Management will continue to follow and assist with discharge planning needs.

## 2021-04-20 NOTE — CARE PLAN
Problem: Venous Thromboembolism (VTW)/Deep Vein Thrombosis (DVT) Prevention:  Goal: Patient will participate in Venous Thrombosis (VTE)/Deep Vein Thrombosis (DVT)Prevention Measures  Outcome: PROGRESSING AS EXPECTED  Note: Pt educated on VTE prevention, pharmacological and physical VTE prophylaxis in place. Pt ambulates and turns in bed.       Problem: Bowel/Gastric:  Goal: Normal bowel function is maintained or improved  Outcome: PROGRESSING AS EXPECTED  Goal: Will not experience complications related to bowel motility  Outcome: PROGRESSING AS EXPECTED     Problem: Knowledge Deficit  Goal: Knowledge of disease process/condition, treatment plan, diagnostic tests, and medications will improve  Outcome: PROGRESSING AS EXPECTED  Note: Pt educated on current POC, expected outcomes and goals, and new medications ordered. All questions and concerns have been answered at this time.    Goal: Knowledge of the prescribed therapeutic regimen will improve  Outcome: PROGRESSING AS EXPECTED     Problem: Discharge Barriers/Planning  Goal: Patient's continuum of care needs will be met  Outcome: PROGRESSING AS EXPECTED     Problem: Psychosocial Needs:  Goal: Level of anxiety will decrease  Outcome: PROGRESSING AS EXPECTED  Note: Pt happy to be moving on from SI issues.     Problem: Mobility  Goal: Risk for activity intolerance will decrease  Outcome: PROGRESSING AS EXPECTED  Note: Discussed plan to mobilize to chair, active range of motion with resistance, educated on benefits of mobilization, risks of activity intolerance.

## 2021-04-20 NOTE — PROGRESS NOTES
Hospital Medicine Twice Weekly Progress Note    Date of Service  4/20/2021      Chief Complaint  Suicidal ideation    Hospital Course  Mr. Castro is a 56 year-old man with a past medical history significant for atrial fibrillation, secondary hypercoagulable state, diabetes mellitus complicated by diabetic neuropathy, chronic systolic CHF, colostomy placement, orthostatic hypotension (maintained on florinef), and dyslipidemia who presented to the ED on 2/9/2021 after he became dizzy and had a syncopal episode at home.  In the ED he was noted to be hypotensive and bradycardic. Cardiology was consulted and recommended stopping all AV sanket blocking medications.  He remained orthostatic during his hospitalization so midodrine was started.  An echocardiogram was obtained and showed mild mitral regurgitation and an EF of 60% which was improved from his previous echocardiogram obtained on 11/2020 (previously 35%).  CTAs of the head and neck were done and were unremarkable outside of distal bilateral ICA tortuosity.  Also during his hospitalization he expressed desire to kill himself.  He has a colostomy and has become overwhelmed by caring for it.  He was subsequently placed on a legal hold and psychiatry was consulted.  He is now pending [facility] placement at the 6-month db for maturity of his colostomy.    Interval Problem Update  4/20- Patient's civil commitment was discontinued this morning.  Received message from nursing that patient again stating suicidal thoughts to staff.  On discussion with patient he states he is not suicidal at this time and has no intention of hurting himself, that he is just depressed related to his colostomy and having trouble dealing with his medical issues.  Patient reassured provider that he is not suicidal at this time.  Patient encouraged to focus on positive thoughts.  Nursing contacted psychiatry who state legal hold is not necessary at this time.      Consultants/Specialty  Cardiology  Psychiatry  General surgery    Code Status  Full Code    Disposition  TBD- Civil commitment discontinued    Review of Systems  Review of Systems   Constitutional: Negative for fever and malaise/fatigue.   HENT: Negative for congestion and sore throat.    Eyes: Negative for pain and discharge.   Respiratory: Negative for cough and shortness of breath.    Cardiovascular: Positive for leg swelling. Negative for chest pain and palpitations.   Gastrointestinal: Negative for abdominal pain, constipation, diarrhea, nausea and vomiting.        Colostomy in place    Genitourinary: Negative for dysuria, frequency and urgency.   Musculoskeletal: Negative for myalgias.   Neurological: Negative for dizziness, sensory change, speech change, focal weakness, weakness and headaches.   Psychiatric/Behavioral: Positive for depression. Negative for suicidal ideas. The patient is nervous/anxious. The patient does not have insomnia.       Physical Exam  Temp:  [35.7 °C (96.2 °F)-36.7 °C (98.1 °F)] 35.7 °C (96.2 °F)  Pulse:  [] 96  Resp:  [18-20] 20  BP: ()/(56-77) 93/56  SpO2:  [93 %-96 %] 96 %    Physical Exam  Vitals and nursing note reviewed.   Constitutional:       General: He is awake. He is not in acute distress.     Appearance: Normal appearance. He is well-developed. He is not ill-appearing.   HENT:      Head: Normocephalic and atraumatic.      Mouth/Throat:      Lips: Pink.      Mouth: Mucous membranes are moist.   Eyes:      General: Lids are normal.      Conjunctiva/sclera: Conjunctivae normal.      Pupils: Pupils are equal, round, and reactive to light.   Cardiovascular:      Rate and Rhythm: Normal rate. Rhythm irregularly irregular.      Pulses: Normal pulses.      Heart sounds: Normal heart sounds.   Pulmonary:      Effort: Pulmonary effort is normal.      Breath sounds: Normal breath sounds. No decreased breath sounds or wheezing.   Abdominal:      General: Bowel sounds  are normal. There is no distension.      Palpations: Abdomen is soft.      Tenderness: There is no abdominal tenderness.       Musculoskeletal:      Cervical back: Neck supple.      Right lower leg: Edema (stable today) present.      Left lower leg: Edema (stable today) present.   Skin:     General: Skin is warm and dry.      Coloration: Skin is pale.   Neurological:      General: No focal deficit present.      Mental Status: He is alert.   Psychiatric:         Attention and Perception: Attention and perception normal.         Mood and Affect: Affect normal. Mood is depressed.         Speech: Speech normal.         Behavior: Behavior normal. Behavior is cooperative.         Thought Content: Thought content does not include suicidal ideation. Thought content does not include suicidal plan.         Cognition and Memory: Memory normal.      Comments: Patient states depressed secondary to colostomy and further need for it, stated to provider that he has no intention of harming himself and is not suicidal at this time.      Fluids    Intake/Output Summary (Last 24 hours) at 4/20/2021 1410  Last data filed at 4/20/2021 0600  Gross per 24 hour   Intake --   Output 900 ml   Net -900 ml     Laboratory          Imaging  [id number] VIEW   Final Result         No significant interval change.      DX-CHEST-LIMITED (1 VIEW)   Final Result         Diffuse interstitial prominence could relate to mild pulmonary edema or atypical infection      [id number] VIEW   Final Result      Increased colonic gas without definite bowel obstruction.      US-RENAL   Final Result      No evidence of hydronephrosis.      Lobulated kidneys bilaterally.      CT-ABDOMEN-PELVIS WITH   Final Result      1.  There is no evidence of small bowel obstruction.   2.  There is a left lower quadrant ostomy.   3.  There is fluid distention of the colon distal to the surgical material in the left mid descending colon extending all the way to the rectum. There  is no pneumatosis or free air.   4.  There is cholelithiasis without biliary dilatation.   5.  There has been interval removal of the drainage catheter anterior to the spleen with minimal hypodense area in the anterior spleen again noted. There is no new fluid collection.      IR-US GUIDED PIV   Final Result    Ultrasound-guided PERIPHERAL IV INSERTION performed by    qualified nursing staff as above.      EC-ECHOCARDIOGRAM COMPLETE W/O CONT   Final Result      DX-LUMBAR SPINE-2 OR 3 VIEWS   Final Result      Moderate compression deformity of T11 is new compared to 2018.      Mild wedge deformity of T12 is unchanged.      Degenerative changes including facet arthropathy.      Mild retrolisthesis of L5 on S1 and L3 on L4.            Assessment/Plan  * Suicidal ideation- (present on admission)  Assessment & Plan  -Has history of MDD, recurrent with psychotic features. active SI w/ plan on admission  -Psychiatry following, recommended discontinuation of civil commitment   - Evaluated bedside and patient states he is depressed but not suicidal    Orthostatic hypotension- (present on admission)  Assessment & Plan  -Multifactorial including adrenal insufficiency, medications (beta blocker, CCB, tamsulosin), diabetic autonomic dysfunction, and venous insufficiency.  -EF is noted to be 60%, significant improvement from prior (previously 35%).  -Per cardiology, no further cardiac work-up needed.  Recommends follow-up with them as outpatient.  -Fall precautions.  -Continue to educate on the importance of slow positional changes.   - Started PO hydrocortisone 4/7/2021 for AI. Continuing florinef.   - Recheck orthostatics BPs   - Decreased midodrine to 2.5mg in attempt to wean, may need to titrate back up if orthostatic hypotension persists.    Paroxysmal atrial fibrillation (HCC)- (present on admission)  Assessment & Plan  -CHADSVASC 3 (HTN, CHF, DM).   -TTE Feb 2021: EF 60%.  -Continue metoprolol, digoxin, and  eliquis.    Chronic heart failure with preserved ejection fraction (HCC)- (present on admission)  Assessment & Plan  -Now stable.  -Pulmonary edema noted in mid-March, was placed on IV diuresis though that was held when he developed a gram-negative gina bacteremia and sepsis.  Currently doing well from a respiratory standpoint, no shortness of breath, lungs clear to auscultation bilaterally, not requiring oxygen supplementation.  -Repeat echocardiogram showed improvement in his ejection fraction to 60%.    -Continue metoprolol succinate.  -Lisinopril held per cardiology recommendations due to hypotension.    Diabetes mellitus type 2, insulin dependent (HCC)- (present on admission)  Assessment & Plan  -A1c 5.2% on 2/14/2021.  -Continue diabetic diet.  -Continue lantus at current dose   -Hypoglycemia protocol in place if needed.      Major depressive disorder, recurrent, severe with psychotic features ([facility])- (present on admission)  Assessment & Plan  -Management as listed under 'anxiety and depression'.    Anxiety and depression- (present on admission)  Assessment & Plan  -Legal hold extended, awaiting acceptance at psych facility ([facility] will take in May).  -Remains suicidal.   -Psychiatry following.  -Continue paxil, klonopin, & geodon.     Hypothyroidism- (present on admission)  Assessment & Plan  -TFTs in Feb were WNL. Continue levothyroxine at current dose.     Adrenal insufficiency (HCC)- (present on admission)  Assessment & Plan  -Started PO hydrocortisone 4/7/2021. Continue florinef.   -Needs outpatient endocrinology follow up.  -Monitor potassium while on florinef. 4.4 on 4/7/2021.  -Repeat orthostatics periodically if symptomatic        -Recheck orthostatics BPs to monitor midodrine weaning     Obstructive uropathy- (present on admission)  Assessment & Plan  -Trial off of flomax given his hypotension.  Discontinued 4/6/2021. No retention thus far.     Chronic venous insufficiency of lower extremity- (present  on admission)  Assessment & Plan  -Continue compression stockings.    Colostomy present (HCC)- (present on admission)  Assessment & Plan  -History of fall at home Nov 2020 on left side with perforated colon and splenic fluid collection/hematoma s/p segmental colectomy, colostomy, mobilization of the splenic flexure, wound vac, and I&D Nov 2020.  -Wound care consulted for ostomy care, but he was non-participatory. He is now engaging in ostomy care with his bedside RNs, encouraged to be observant but do it independently.  -Patient must be independently able to care for his ostomy before inpatient psychiatry will accept him.  Per surgery Dr. Desouza, ostomy reversal is high risk given patient's complexities. Plan for close multidisciplinary outpatient follow-up for surgery when patients overall medical conditions are more optimized.    Debility- (present on admission)  Assessment & Plan  -Cleared by PT/OT.  -Out of bed for each meal and ambulate in the hallways at least twice per shift.    Mixed hyperlipidemia- (present on admission)  Assessment & Plan  Lab Results   Component Value Date/Time    CHOLSTRLTOT 146 02/14/2021 01:30 AM    LDL 82 02/14/2021 01:30 AM    HDL 32 (A) 02/14/2021 01:30 AM    TRIGLYCERIDE 160 (H) 02/14/2021 01:30 AM   -Continue atorvastatin.     Lower limb amputation, great toe (HCC)- (present on admission)  Assessment & Plan  -History of, 2018.  Site well-healed.    Class 2 severe obesity with serious comorbidity and body mass index (BMI) of 35.0 to 35.9 in adult (HCC)- (present on admission)  Assessment & Plan  Body mass index is 35.19 kg/m².-Trending down.  -Could benefit from outpatient weight loss counseling which can be arranged through his PCP after hospital discharge.       VTE prophylaxis: Eliquis

## 2021-04-21 ENCOUNTER — APPOINTMENT (OUTPATIENT)
Dept: CARDIOLOGY | Facility: MEDICAL CENTER | Age: 56
End: 2021-04-21
Payer: MEDICAID

## 2021-04-21 LAB
ANION GAP SERPL CALC-SCNC: 7 MMOL/L (ref 7–16)
BUN SERPL-MCNC: 32 MG/DL (ref 8–22)
CALCIUM SERPL-MCNC: 9.5 MG/DL (ref 8.5–10.5)
CHLORIDE SERPL-SCNC: 100 MMOL/L (ref 96–112)
CO2 SERPL-SCNC: 28 MMOL/L (ref 20–33)
CREAT SERPL-MCNC: 0.91 MG/DL (ref 0.5–1.4)
ERYTHROCYTE [DISTWIDTH] IN BLOOD BY AUTOMATED COUNT: 45.9 FL (ref 35.9–50)
GLUCOSE SERPL-MCNC: 125 MG/DL (ref 65–99)
HCT VFR BLD AUTO: 43.8 % (ref 42–52)
HGB BLD-MCNC: 14 G/DL (ref 14–18)
MCH RBC QN AUTO: 28.5 PG (ref 27–33)
MCHC RBC AUTO-ENTMCNC: 32 G/DL (ref 33.7–35.3)
MCV RBC AUTO: 89.2 FL (ref 81.4–97.8)
PLATELET # BLD AUTO: 268 K/UL (ref 164–446)
PMV BLD AUTO: 10.7 FL (ref 9–12.9)
POTASSIUM SERPL-SCNC: 3.8 MMOL/L (ref 3.6–5.5)
RBC # BLD AUTO: 4.91 M/UL (ref 4.7–6.1)
SODIUM SERPL-SCNC: 135 MMOL/L (ref 135–145)
WBC # BLD AUTO: 14 K/UL (ref 4.8–10.8)

## 2021-04-21 PROCEDURE — 85027 COMPLETE CBC AUTOMATED: CPT

## 2021-04-21 PROCEDURE — A9270 NON-COVERED ITEM OR SERVICE: HCPCS | Performed by: STUDENT IN AN ORGANIZED HEALTH CARE EDUCATION/TRAINING PROGRAM

## 2021-04-21 PROCEDURE — 700102 HCHG RX REV CODE 250 W/ 637 OVERRIDE(OP): Performed by: NURSE PRACTITIONER

## 2021-04-21 PROCEDURE — 99232 SBSQ HOSP IP/OBS MODERATE 35: CPT | Performed by: NURSE PRACTITIONER

## 2021-04-21 PROCEDURE — 700102 HCHG RX REV CODE 250 W/ 637 OVERRIDE(OP): Performed by: STUDENT IN AN ORGANIZED HEALTH CARE EDUCATION/TRAINING PROGRAM

## 2021-04-21 PROCEDURE — A9270 NON-COVERED ITEM OR SERVICE: HCPCS | Performed by: NURSE PRACTITIONER

## 2021-04-21 PROCEDURE — A9270 NON-COVERED ITEM OR SERVICE: HCPCS | Performed by: HOSPITALIST

## 2021-04-21 PROCEDURE — 36415 COLL VENOUS BLD VENIPUNCTURE: CPT

## 2021-04-21 PROCEDURE — 770001 HCHG ROOM/CARE - MED/SURG/GYN PRIV*

## 2021-04-21 PROCEDURE — 80048 BASIC METABOLIC PNL TOTAL CA: CPT

## 2021-04-21 PROCEDURE — 700102 HCHG RX REV CODE 250 W/ 637 OVERRIDE(OP): Performed by: HOSPITALIST

## 2021-04-21 RX ADMIN — METOPROLOL SUCCINATE 25 MG: 25 TABLET, EXTENDED RELEASE ORAL at 05:09

## 2021-04-21 RX ADMIN — POTASSIUM CHLORIDE 20 MEQ: 20 TABLET, EXTENDED RELEASE ORAL at 05:09

## 2021-04-21 RX ADMIN — SIMETHICONE 80 MG: 80 TABLET, CHEWABLE ORAL at 16:33

## 2021-04-21 RX ADMIN — PAROXETINE HYDROCHLORIDE 20 MG: 20 TABLET, FILM COATED ORAL at 07:29

## 2021-04-21 RX ADMIN — HYDROCORTISONE 10 MG: 10 TABLET ORAL at 05:09

## 2021-04-21 RX ADMIN — SIMETHICONE 80 MG: 80 TABLET, CHEWABLE ORAL at 12:00

## 2021-04-21 RX ADMIN — LEVOTHYROXINE SODIUM 112 MCG: 0.11 TABLET ORAL at 05:09

## 2021-04-21 RX ADMIN — MIDODRINE HYDROCHLORIDE 2.5 MG: 5 TABLET ORAL at 07:27

## 2021-04-21 RX ADMIN — CLONAZEPAM 1 MG: 1 TABLET ORAL at 16:32

## 2021-04-21 RX ADMIN — CLONAZEPAM 0.5 MG: 0.5 TABLET ORAL at 07:28

## 2021-04-21 RX ADMIN — MIDODRINE HYDROCHLORIDE 2.5 MG: 5 TABLET ORAL at 10:43

## 2021-04-21 RX ADMIN — FLUDROCORTISONE ACETATE 0.1 MG: 0.1 TABLET ORAL at 05:09

## 2021-04-21 RX ADMIN — MIDODRINE HYDROCHLORIDE 2.5 MG: 5 TABLET ORAL at 16:32

## 2021-04-21 RX ADMIN — ATORVASTATIN CALCIUM 40 MG: 40 TABLET, FILM COATED ORAL at 16:33

## 2021-04-21 RX ADMIN — APIXABAN 5 MG: 5 TABLET, FILM COATED ORAL at 19:44

## 2021-04-21 RX ADMIN — DIGOXIN 125 MCG: 125 TABLET ORAL at 16:33

## 2021-04-21 RX ADMIN — SIMETHICONE 80 MG: 80 TABLET, CHEWABLE ORAL at 05:09

## 2021-04-21 RX ADMIN — ZIPRASIDONE HYDROCHLORIDE 60 MG: 60 CAPSULE ORAL at 10:05

## 2021-04-21 RX ADMIN — HYDROCORTISONE 5 MG: 10 TABLET ORAL at 15:39

## 2021-04-21 RX ADMIN — APIXABAN 5 MG: 5 TABLET, FILM COATED ORAL at 07:28

## 2021-04-21 RX ADMIN — ZIPRASIDONE HYDROCHLORIDE 60 MG: 60 CAPSULE ORAL at 19:44

## 2021-04-21 ASSESSMENT — ENCOUNTER SYMPTOMS
NAUSEA: 0
SENSORY CHANGE: 0
CONSTIPATION: 0
HEADACHES: 0
VOMITING: 0
SPEECH CHANGE: 0
EYE DISCHARGE: 0
SHORTNESS OF BREATH: 0
DEPRESSION: 1
PALPITATIONS: 0
FEVER: 0
COUGH: 0
DIARRHEA: 0
FOCAL WEAKNESS: 0
INSOMNIA: 0
MYALGIAS: 0
SORE THROAT: 0
EYE PAIN: 0
WEAKNESS: 0
NERVOUS/ANXIOUS: 1
DIZZINESS: 0
ABDOMINAL PAIN: 0
ROS GI COMMENTS: COLOSTOMY IN PLACE

## 2021-04-21 NOTE — CARE PLAN
Assumed day shift care at [facility]  Patient a+o x 4  denies pain, denies any thoughts of hurting himself  Vss, afebrile  +ostomy / +output  No needs at this time, wctm      Fall precautions/hourly rounding maintained, call light within reach and functioning, all items within reach.  Patient encouraged to call for assistance, poc reviewed with patient, ?'s/concerns answered.  Bed alarm active.       Problem: Safety  Goal: Will remain free from injury  Outcome: PROGRESSING AS EXPECTED     Problem: Knowledge Deficit  Goal: Knowledge of disease process/condition, treatment plan, diagnostic tests, and medications will improve  Outcome: PROGRESSING AS EXPECTED

## 2021-04-21 NOTE — CARE PLAN
Problem: Safety  Goal: Will remain free from falls  Outcome: PROGRESSING AS EXPECTED     Problem: Venous Thromboembolism (VTW)/Deep Vein Thrombosis (DVT) Prevention:  Goal: Patient will participate in Venous Thrombosis (VTE)/Deep Vein Thrombosis (DVT)Prevention Measures  Outcome: PROGRESSING AS EXPECTED

## 2021-04-21 NOTE — DISCHARGE PLANNING
Anticipated Discharge Disposition:   TBD    Action:   RN CM spoke to bedside RN about discharge planning. Last PT/OT note was from 4/1/21. Bedside RN to informed APRN of PT/OT reeval and recommendation. Per bedside RN, pt was able to help with emptying ostomy earlier.    Barriers to Discharge:   Medical clearance  PT/OT reeval and recommendations    Plan:   Hospital Care Management will continue to follow and assist with discharge planning needs.

## 2021-04-21 NOTE — PROGRESS NOTES
Called to room and patient said he is having SI thoughts with a plan. Reached out to Cristy RICKS and was told to reach out to psych. Reached out to psych-  Dr. Berger texted back and said that she discussed patient with Emerald RICKS. Cristy Araiza to speak with patient regarding poc.

## 2021-04-21 NOTE — WOUND TEAM
[facility]  Inpatient Services  Wound and Skin Care Progress Note    Admission Date: 2/9/2021     Last order of IP CONSULT TO WOUND CARE was found on 3/18/2021 from Hospital Encounter on 2/9/2021     HPI, PMH, SH: Reviewed    Past Surgical History:   Procedure Laterality Date   • COLECTOMY N/A 11/10/2020    Procedure: COLECTOMY-SEGMENTAL, COLOSTOMY, MOBILIZATION OF SPLENIC FLEXURE, WOUND VAC PLACEMENT, IRRIGATION AND DEBRIDMENT FLANK WOUND;  Surgeon: Kin Desouza D.O.;  Location: [facility];  Service: General   • ZZZ CARDIAC CATH  07/21/2018    EF 45%, normal coronaries.   • IRRIGATION & DEBRIDEMENT ORTHO Right 2/19/2018    Procedure: IRRIGATION & DEBRIDEMENT ORTHO-FOOT;  Surgeon: Kirby Lopez M.D.;  Location: [facility];  Service: Orthopedics   • TOE AMPUTATION Right 1/17/2018    Procedure: TOE AMPUTATION-1ST RAY;  Surgeon: Doug Delong M.D.;  Location: [facility];  Service: Orthopedics   • TOE AMPUTATION Right 11/10/2017    Procedure: TOE AMPUTATION;  Surgeon: Osorio Leo M.D.;  Location: [facility];  Service: Orthopedics     Social History     Tobacco Use   • Smoking status: Never Smoker   • Smokeless tobacco: Never Used   Substance Use Topics   • Alcohol use: No     Chief Complaint   Patient presents with   • Syncope     X1 this morning when standing up to go to bathroom     Diagnosis: Syncope and collapse [R55]  Suicidal ideation [R45.851]  Sepsis due to Klebsiella pneumoniae (HCC) [A41.4]    Unit where seen by Wound Team: S148/00     WOUND CONSULT/FOLLOW UP RELATED TO:  Sacrum & R posterior thigh follow up    WOUND HISTORY:  Pt is an older gentleman well known to 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 Pt currently admitted since 2/9/21 related to SI concerns due to body image issues related to colostomy. Wound team was consulted regarding sacrum and Right posterior thigh wounds.     WOUND  ASSESSMENT/LDA          Wound 11/10/20 Full Thickness Wound Abdomen Midline resolving open surgical  (Active)   Wound Image   04/15/21 1356   Site Assessment Red;Granulation tissue    Periwound Assessment Pink    Margins Attached edges    Closure Secondary intention    Drainage Amount Scant    Drainage Description Sanguineous    Treatments Site care    Wound Cleansing Normal Saline Irrigation    Periwound Protectant Skin Protectant Wipes to Periwound    Dressing Cleansing/Solutions Not Applicable    Dressing Options Hydrofera Blue Ready;Collagen Dressing    Dressing Changed Changed    Dressing Status Intact    Dressing Change/Treatment Frequency Every 72 hrs, and As Needed    NEXT Dressing Change/Treatment Date 04/24/21    NEXT Weekly Photo (Inpatient Only) 04/22/21    Number of Staples Removed 10    Non-staged Wound Description Full thickness    Wound Length (cm) 3 cm    Wound Width (cm) 5 cm    Wound Depth (cm) 0 cm    Wound Surface Area (cm^2) 15 cm^2    Wound Volume (cm^3) 0 cm^3    Wound Healing % 100    Wound Bed Granulation (%) 80 %    Tunneling (cm) 0 cm    Tunneling Clock Position of Wound 12    Undermining (cm) 0 cm    Shape irregular    Wound Odor None    Pulses N/A    Exposed Structures None              Vascular:    SHAYAN:   No results found.    Lab Values:    Lab Results   Component Value Date/Time    WBC 14.0 (H) 04/21/2021 01:53 AM    RBC 4.91 04/21/2021 01:53 AM    HEMOGLOBIN 14.0 04/21/2021 01:53 AM    HEMATOCRIT 43.8 04/21/2021 01:53 AM    CREACTPROT 7.52 (H) 11/29/2020 01:25 AM    SEDRATEWES 13 10/23/2018 08:02 AM    HBA1C 5.2 02/14/2021 01:30 AM      Culture Results show:  No results found for this or any previous visit (from the past 720 hour(s)).    Pain Level/Medicated:  Denies       INTERVENTIONS BY WOUND TEAM:  Chart and images reviewed. Discussed with bedside RN. All areas of concern (based on picture review, LDA review and discussion with bedside RN) have been thoroughly assessed.  Documentation of areas based on significant findings. This RN in to assess patient. Performed standard wound care which includes appropriate positioning, dressing removal and non-selective debridement. Pictures and measurements obtained weekly if/when required.              ABD:  Preparation for Dressing removal: None   Cleansed with:  NS and gauze  Sharp debridement: NA  Joya wound: no sting skin prep  Primary Dressing: Moistened arti (collagen), hydrofera blue,   Secondary (Outer) Dressing:  Secured with hypafix tape    Interdisciplinary consultation: Patient, Bedside RN    EVALUATION / RATIONALE FOR TREATMENT:  Most Recent Date:  4/21: abd wound improving. Nursing to continue dsg changes per order.    4/15: Right posterior thigh DTI now resolved. Pink and blanching. Abdominal wound to surface level continuing with dressing POC.    4/7/21: Thigh wound nearly healed and will likely be resolved at next assessment. Abdomen wound appears larger. Continued with Arti to move through inflammatory phase. Hydrofera blue for its bacteriostatic properties and to assist in drainage management.     03/31/21: thigh wound healing well now to a stage 2.  Continue current plan.  All interventions in place.   ABD more denuded and bloody.  Arti to assist in moving wound through inflammatory phase.   3/23/21: Right posterior thigh wound remains a DTI but appears to be evolving into a stage 2. Wound team was previously consulted regarding sacral wound which appeared to be a skin tear however skin now appears to be intact however discolored. Does not appear to be pressure related as pt is on a waffle, self mobile and sacral mepilex is in use. Area is also not a normal pressure injury shape. Wound team will continue to follow.   3/18: Pt with linear DTIs to R posterior thigh from lying on top of frye cath tubing. Offloading precautions ordered. Wound team to continue following.     Goals: Steady decrease in wound area and depth  "weekly.    WOUND TEAM PLAN OF CARE ([X] for frequency of wound follow up,):   Nursing to follow orders written for wound care. Contact wound team if area fails to progress, deteriorates or with any questions/concerns  Dressing changes by wound team:                   Follow up 3 times weekly:                NPWT change 3 times weekly:     Follow up 1-2 times weekly: X nursing to perform dressing care; WT following     Follow up Bi-Monthly:                   Follow up as needed:     Other (explain):     NURSING PLAN OF CARE ORDERS (X):  Dressing changes: See Dressing Care orders:   Skin care: See Skin Care orders: x  RN Prevention Protocol: x  Rectal tube care: See Rectal Tube Care orders:   Other orders:        Anticipated discharge plans: TBD- pending psych transfer  LTACH:        SNF/Rehab:                  Home Health Care:           Outpatient Wound Center:            Self/Family Care:        Other:                        [facility]   Inpatient Services   Established Ostomy Management/ troubleshooting  HPI: Reviewed  PMH: Reviewed   SH: Reviewed   Reason for Ostomy nurse consult:  Established colostomy        Objective: appliance intact, sitter no longer in place.      Colostomy 11/10/20 Transverse LUQ (Active)   Wound Image   04/02/21 2300   Stomal Appliance Assessment Intact    Stoma Assessment Pink    Stoma Shape Round    Stoma Size (in) 1.25    Peristomal Assessment Intact    Mucocutaneous Junction Intact    Treatment Appliance Changed    Peristomal Protectant Paste Ring    Stomal Appliance 2 1/4\" (57mm) CTF    Output (mL) 100 mL    Output Color Yellow    WOUND RN ONLY - Stomal Appliance  2 Piece;Paste Ring, 2\";Transparent Pouch Lock & Roll;2 1/4\" Moldable    Appliance (Pouch) # [id number]    Appliance Brand [employer]    Appliance Supplier [facility]                  Ostomy Appliance (type and size): 2 1/4\" Formaflex barrier with pouch and Paste Ring,      Interventions and Education: Patient " removed appliance. Explained how to use forma flex barrier and pt molded barrier to fit. Pt applied paste ring to barrier and then applied to skin. Py then attached pouch to barrier, ostomy RN assisted with fully attaching pouch. Pt then closed pouch end.    Evaluation:   4/21: Pt continuing with hands on care. Expressed some difficulty attaching pouch, however, performs well with formaflex barrier.    4/15: used formaflex (moldable) barrier today which pt prefers to use. Pt stated he needs another education session because he needs more practice. Ostomy RN will continue to meet with pt.    4/7/21:  Pt reports he has been burping pouch and is capable of changing appliance. Pt does have difficulty cutting barrier. May be able to transition to moldable at next change. Pt appears in better spirits today. Ostomy RN to remain available for education.     03/31/21: patient capable of changing ostomy but still having emotional difficulty with it.  Wound team will continue to follow 1-2 times weekly.  Patient not appropriate for surgical reversal at this time.         3/15/21:  Pt being transferred to T8 related to bacteremia and allergic reaction to zosyn overnight. Appliance working appropriately. Skin intact. Will plan to sign off and be available PRN.     3/11/21: Patient formed own formaflex barrier pouch and paste ring, assisted in cleansing. Feeling better and willing to continue to get more hands on. Likes the formaflex because he is actually able to form it to his size. Still has not performed independently.      3/8/21: Minimal participation, he attempts to do the steps with his eyes closed and minimal effort with his hands. Not sure if he lacks the fine motor movement or just going through the motions due to his expressed hatred of the colostomy. Patient has yet to perform all steps independently.     3/5/21: Pt did not participate in education today. Pt answered questions appropriately, but remained with his  eyes closed through the duration of ostomy care. Declined hands on education when offered. Reported he was not feeling good. Formaflex barrier brought in today for pt as these do no require cutting with scissors. Extra barrier left in bag. CT of abd obtained yesterday did not reveal obstruction.     3/1/21: Viable stoma, producing stool. Per CNA report at bedside, appliance was leaking earlier and CNA changed appliance. Patient observed all steps today and participated with hands on care and did well. Discussed with MD on possibility for reversal. Per surgery, reversal of ostomy is an outpatient procedure that he will have to follow-up with in the future after discharge     2/26/21: Pt very down today, reports that psych has not seen him. Pt kept turning head away from ostomy today but did verbalized he will try to do hands on Sunday.      2/24/21: Pt unable to transfer to inpatient psych until he can care for colostomy independently. Ostomy RNs were asked to return for education. Pt extremely depressed today. Pt states he feels like an animal and that the colostomy is messy and smelly and he would rather die then care for it. This RN provided understanding and attempted to explain that colostomy is manageable. 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 Pt had colostomy created on November 10, 2020 by Dr. Desouza. May want to consider contacting surgery for possible reversal related to pts extreme body dysmorphia (this is pts 2nd admit for SI related to colostomy) and inability for pt to DC due to pts inability/unwillingness to care for colostomy - Discussed with Dr. Ley ([facility] Resident).      Plan: Wound team to continue with teaching every 3-4 days to help teach patient how to care for appliance so that he can  discharge to psych.     Anticipated discharge needs: patient would benefit from SNF, outpatient clinic or Home health for follow up care. Patient needs to follow up with Dr. Desouza after discharge to plan for ostomy reversal surgery.

## 2021-04-21 NOTE — PROGRESS NOTES
Hospital Medicine Twice Weekly Progress Note    Date of Service  4/21/2021      Chief Complaint  Suicidal ideation    Hospital Course  Mr. Castro is a 56 year-old man with a past medical history significant for atrial fibrillation, secondary hypercoagulable state, diabetes mellitus complicated by diabetic neuropathy, chronic systolic CHF, colostomy placement, orthostatic hypotension (maintained on florinef), and dyslipidemia who presented to the ED on 2/9/2021 after he became dizzy and had a syncopal episode at home.  In the ED he was noted to be hypotensive and bradycardic. Cardiology was consulted and recommended stopping all AV sanket blocking medications.  He remained orthostatic during his hospitalization so midodrine was started.  An echocardiogram was obtained and showed mild mitral regurgitation and an EF of 60% which was improved from his previous echocardiogram obtained on 11/2020 (previously 35%).  CTAs of the head and neck were done and were unremarkable outside of distal bilateral ICA tortuosity.  Also during his hospitalization he expressed desire to kill himself.  He has a colostomy and has become overwhelmed by caring for it.  He was subsequently placed on a legal hold and psychiatry was consulted.  He is now pending [facility] placement at the 6-month db for maturity of his colostomy.    Interval Problem Update  4/20- Patient's civil commitment was discontinued this morning.  Received message from nursing that patient again stating suicidal thoughts to staff.  On discussion with patient he states he is not suicidal at this time and has no intention of hurting himself, that he is just depressed related to his colostomy and having trouble dealing with his medical issues.  Patient reassured provider that he is not suicidal at this time.  Patient encouraged to focus on positive thoughts.  Nursing contacted psychiatry who state legal hold is not necessary at this time.   4/21- Patient again stating he is  "having suicidal ideations and expressed to nursing that he has a plan to slit his throat. Discussed patient with Psychiatry and agree with her assessment that patient makes statements but his actions are not coinciding with his expressions.  Went to bedside and discussed with patient his comments.  Patient directly stated \" I am having those suicidal ideations again\".  Asked patient point blank if he wanted to live at which he stated \" yes I want to live\".  Talked at length with patient regarding possible thoughts that would enter his head and ability to focus on positive aspects.  At end of conversation patient states he had no intention of committing suicide and will focus on getting well.  Questionable secondary gain as motive for daily expression of suicidal ideation.     Consultants/Specialty  Cardiology  Psychiatry  General surgery    Code Status  Full Code    Disposition  TBD- Civil commitment discontinued-working on discharge with case management    Review of Systems  Review of Systems   Constitutional: Negative for fever and malaise/fatigue.   HENT: Negative for congestion and sore throat.    Eyes: Negative for pain and discharge.   Respiratory: Negative for cough and shortness of breath.    Cardiovascular: Positive for leg swelling. Negative for chest pain and palpitations.   Gastrointestinal: Negative for abdominal pain, constipation, diarrhea, nausea and vomiting.        Colostomy in place    Genitourinary: Negative for dysuria, frequency and urgency.   Musculoskeletal: Negative for myalgias.   Neurological: Negative for dizziness, sensory change, speech change, focal weakness, weakness and headaches.   Psychiatric/Behavioral: Positive for depression. Negative for suicidal ideas. The patient is nervous/anxious. The patient does not have insomnia.       Physical Exam  Temp:  [35.7 °C (96.2 °F)-36.2 °C (97.2 °F)] 36 °C (96.8 °F)  Pulse:  [71-85] 75  Resp:  [18-20] 18  BP: (110-132)/(64-82) 110/64  SpO2:  " [91 %-94 %] 94 %    Physical Exam  Vitals and nursing note reviewed.   Constitutional:       General: He is awake. He is not in acute distress.     Appearance: Normal appearance. He is well-developed. He is not ill-appearing.   HENT:      Head: Normocephalic and atraumatic.      Mouth/Throat:      Lips: Pink.      Mouth: Mucous membranes are moist.   Eyes:      General: Lids are normal.      Conjunctiva/sclera: Conjunctivae normal.      Pupils: Pupils are equal, round, and reactive to light.   Cardiovascular:      Rate and Rhythm: Normal rate. Rhythm irregularly irregular.      Pulses: Normal pulses.      Heart sounds: Normal heart sounds.   Pulmonary:      Effort: Pulmonary effort is normal.      Breath sounds: Normal breath sounds. No decreased breath sounds or wheezing.   Abdominal:      General: Bowel sounds are normal. There is no distension.      Palpations: Abdomen is soft.      Tenderness: There is no abdominal tenderness.       Musculoskeletal:      Cervical back: Neck supple.      Right lower leg: Edema (stable today) present.      Left lower leg: Edema (stable today) present.   Skin:     General: Skin is warm and dry.      Coloration: Skin is pale.   Neurological:      General: No focal deficit present.      Mental Status: He is alert.   Psychiatric:         Attention and Perception: Attention and perception normal.         Mood and Affect: Affect normal. Mood is depressed.         Speech: Speech normal.         Behavior: Behavior normal. Behavior is cooperative.         Thought Content: Thought content does not include suicidal ideation. Thought content does not include suicidal plan.         Cognition and Memory: Memory normal.      Comments: Patient states depressed secondary to colostomy and further need for it, stated to provider that he has no intention of harming himself and is not suicidal at this time.      Exam unchanged from previous on 4/20    Fluids    Intake/Output Summary (Last 24 hours) at  4/21/2021 1603  Last data filed at 4/21/2021 1400  Gross per 24 hour   Intake 240 ml   Output 1200 ml   Net -960 ml     Laboratory  Recent Labs     04/21/21  0153   WBC 14.0*   RBC 4.91   HEMOGLOBIN 14.0   HEMATOCRIT 43.8   MCV 89.2   MCH 28.5   MCHC 32.0*   RDW 45.9   PLATELETCT 268   MPV 10.7     Recent Labs     04/21/21  0153   SODIUM 135   POTASSIUM 3.8   CHLORIDE 100   CO2 28   GLUCOSE 125*   BUN 32*   CREATININE 0.91   CALCIUM 9.5     Imaging  TM-[id number]-3 VIEW   Final Result         No significant interval change.      DX-CHEST-LIMITED (1 VIEW)   Final Result         Diffuse interstitial prominence could relate to mild pulmonary edema or atypical infection      [id number] VIEW   Final Result      Increased colonic gas without definite bowel obstruction.      US-RENAL   Final Result      No evidence of hydronephrosis.      Lobulated kidneys bilaterally.      CT-ABDOMEN-PELVIS WITH   Final Result      1.  There is no evidence of small bowel obstruction.   2.  There is a left lower quadrant ostomy.   3.  There is fluid distention of the colon distal to the surgical material in the left mid descending colon extending all the way to the rectum. There is no pneumatosis or free air.   4.  There is cholelithiasis without biliary dilatation.   5.  There has been interval removal of the drainage catheter anterior to the spleen with minimal hypodense area in the anterior spleen again noted. There is no new fluid collection.      IR-US GUIDED PIV   Final Result    Ultrasound-guided PERIPHERAL IV INSERTION performed by    qualified nursing staff as above.      EC-ECHOCARDIOGRAM COMPLETE W/O CONT   Final Result      DX-LUMBAR SPINE-2 OR 3 VIEWS   Final Result      Moderate compression deformity of T11 is new compared to 2018.      Mild wedge deformity of T12 is unchanged.      Degenerative changes including facet arthropathy.      Mild retrolisthesis of L5 on S1 and L3 on L4.            Assessment/Plan  * Suicidal  "ideation- (present on admission)  Assessment & Plan  -Has history of MDD, recurrent with psychotic features. active SI w/ plan on admission  -Psychiatry following, recommended discontinuation of civil commitment - suspect possible malingering or secondary gain   - Evaluated bedside again and discussed with patient who states he \"wants to live\"   -Psych recommending sitter to ensure patient does not attempt actions that would extend his stay     Orthostatic hypotension- (present on admission)  Assessment & Plan  -Multifactorial including adrenal insufficiency, medications (beta blocker, CCB, tamsulosin), diabetic autonomic dysfunction, and venous insufficiency.  -EF is noted to be 60%, significant improvement from prior (previously 35%).  -Per cardiology, no further cardiac work-up needed.  Recommends follow-up with them as outpatient.  -Fall precautions.  -Continue to educate on the importance of slow positional changes.   - Started PO hydrocortisone 4/7/2021 for AI. Continuing florinef.   - Recheck orthostatics BPs   - Decreased midodrine to 2.5mg in attempt to wean, may need to titrate back up if orthostatic hypotension persists.    Paroxysmal atrial fibrillation (HCC)- (present on admission)  Assessment & Plan  -CHADSVASC 3 (HTN, CHF, DM).   -TTE Feb 2021: EF 60%.  -Continue metoprolol, digoxin, and eliquis.    Chronic heart failure with preserved ejection fraction (HCC)- (present on admission)  Assessment & Plan  -Now stable.  -Pulmonary edema noted in mid-March, was placed on IV diuresis though that was held when he developed a gram-negative gina bacteremia and sepsis.  Currently doing well from a respiratory standpoint, no shortness of breath, lungs clear to auscultation bilaterally, not requiring oxygen supplementation.  -Repeat echocardiogram showed improvement in his ejection fraction to 60%.    -Continue metoprolol succinate.  -Lisinopril held per cardiology recommendations due to hypotension.    Diabetes " mellitus type 2, insulin dependent (HCC)- (present on admission)  Assessment & Plan  -A1c 5.2% on 2/14/2021.  -Continue diabetic diet.  -Continue lantus at current dose   -Hypoglycemia protocol in place if needed.      Major depressive disorder, recurrent, severe with psychotic features (HCC)- (present on admission)  Assessment & Plan  -Management as listed under 'anxiety and depression'.    Anxiety and depression- (present on admission)  Assessment & Plan  -Legal hold extended, awaiting acceptance at psych facility ([facility] will take in May).  -Remains suicidal.   -Psychiatry following.  -Continue paxil, klonopin, & geodon.     Hypothyroidism- (present on admission)  Assessment & Plan  -TFTs in Feb were WNL. Continue levothyroxine at current dose.     Adrenal insufficiency (HCC)- (present on admission)  Assessment & Plan  -Started PO hydrocortisone 4/7/2021. Continue florinef.   -Needs outpatient endocrinology follow up.  -Monitor potassium while on florinef. 4.4 on 4/7/2021.  -Repeat orthostatics periodically if symptomatic        -Recheck orthostatics BPs to monitor midodrine weaning     Obstructive uropathy- (present on admission)  Assessment & Plan  -Trial off of flomax given his hypotension.  Discontinued 4/6/2021. No retention thus far.     Chronic venous insufficiency of lower extremity- (present on admission)  Assessment & Plan  -Continue compression stockings.    Colostomy present (HCC)- (present on admission)  Assessment & Plan  -History of fall at home Nov 2020 on left side with perforated colon and splenic fluid collection/hematoma s/p segmental colectomy, colostomy, mobilization of the splenic flexure, wound vac, and I&D Nov 2020.  -Wound care consulted for ostomy care, but he was non-participatory. He is now engaging in ostomy care with his bedside RNs, encouraged to be observant but do it independently.  -Patient must be independently able to care for his ostomy before inpatient psychiatry will accept  him.  Per surgery Dr. Desouza, ostomy reversal is high risk given patient's complexities. Plan for close multidisciplinary outpatient follow-up for surgery when patients overall medical conditions are more optimized.    Debility- (present on admission)  Assessment & Plan  -Cleared by PT/OT.  -Out of bed for each meal and ambulate in the hallways at least twice per shift.    Mixed hyperlipidemia- (present on admission)  Assessment & Plan  Lab Results   Component Value Date/Time    CHOLSTRLTOT 146 02/14/2021 01:30 AM    LDL 82 02/14/2021 01:30 AM    HDL 32 (A) 02/14/2021 01:30 AM    TRIGLYCERIDE 160 (H) 02/14/2021 01:30 AM   -Continue atorvastatin.     Lower limb amputation, great toe (HCC)- (present on admission)  Assessment & Plan  -History of, 2018.  Site well-healed.    Class 2 severe obesity with serious comorbidity and body mass index (BMI) of 35.0 to 35.9 in adult (HCC)- (present on admission)  Assessment & Plan  Body mass index is 35.19 kg/m².-Trending down.  -Could benefit from outpatient weight loss counseling which can be arranged through his PCP after hospital discharge.       VTE prophylaxis: Eliquis

## 2021-04-21 NOTE — CONSULTS
Patient not seen.    Case reviewed and discussed with Dr. Nate Justice MD, Chief of behavioral health.    Patient has been admitted since 2/9/2021 and has been followed by psychiatry shortly after admission for suicidal ideations.  Patient during this period of time has been seen by all providers in our services, including psychologist Dr. Fernandez.  After a long discussion with psychiatric team, and later on on Monday, 4/19/2021, Dr. Fernandez and I concluded that patient no longer required inpatient psychiatric hospitalization, therefore I discontinue legal hold.  We saw patient together and clearly discussed plan with patient, and he conveyed understanding and agreement.    Patient throughout hospitalization has accepted all medical care despite of expressing chronic suicidal thoughts.  Again, as emphasized on my last 2 assessments, patient's behavior is incongruent with his mood.  He has multiple protective factors, and has not attempted suicide, neither has made any gestures to hurt himself during this admission.  I discussed case with Dr. Justice today, and he agreed with me that patient is expressing suicidal ideations with the secondary gaining of obtaining shelter in the hospital and subsequently prolong his hospitalization stay.  This behavior is being driven by his personality disorder.  As I discussed with MILLICENT Alvarez, medical team should work with  on discharge.  Please inform patient of the date of discharge so he is aware and he can be prepared for his discharge.  Legal hold will not be reinstated and psychiatric services will not be seeing patient.  Please keep a sitter in his room to ensure that patient does not make any suicidal gestures in order to prolong his hospitalization.

## 2021-04-22 PROCEDURE — A9270 NON-COVERED ITEM OR SERVICE: HCPCS | Performed by: NURSE PRACTITIONER

## 2021-04-22 PROCEDURE — 700102 HCHG RX REV CODE 250 W/ 637 OVERRIDE(OP): Performed by: STUDENT IN AN ORGANIZED HEALTH CARE EDUCATION/TRAINING PROGRAM

## 2021-04-22 PROCEDURE — 700102 HCHG RX REV CODE 250 W/ 637 OVERRIDE(OP): Performed by: NURSE PRACTITIONER

## 2021-04-22 PROCEDURE — A9270 NON-COVERED ITEM OR SERVICE: HCPCS | Performed by: STUDENT IN AN ORGANIZED HEALTH CARE EDUCATION/TRAINING PROGRAM

## 2021-04-22 PROCEDURE — 770001 HCHG ROOM/CARE - MED/SURG/GYN PRIV*

## 2021-04-22 PROCEDURE — 700102 HCHG RX REV CODE 250 W/ 637 OVERRIDE(OP): Performed by: HOSPITALIST

## 2021-04-22 PROCEDURE — A9270 NON-COVERED ITEM OR SERVICE: HCPCS | Performed by: HOSPITALIST

## 2021-04-22 RX ORDER — METOPROLOL SUCCINATE 25 MG/1
12.5 TABLET, EXTENDED RELEASE ORAL
Status: DISCONTINUED | OUTPATIENT
Start: 2021-04-23 | End: 2021-06-06

## 2021-04-22 RX ADMIN — ZIPRASIDONE HYDROCHLORIDE 60 MG: 60 CAPSULE ORAL at 19:16

## 2021-04-22 RX ADMIN — ATORVASTATIN CALCIUM 40 MG: 40 TABLET, FILM COATED ORAL at 17:19

## 2021-04-22 RX ADMIN — PAROXETINE HYDROCHLORIDE 20 MG: 20 TABLET, FILM COATED ORAL at 07:32

## 2021-04-22 RX ADMIN — MIDODRINE HYDROCHLORIDE 2.5 MG: 5 TABLET ORAL at 07:30

## 2021-04-22 RX ADMIN — DIGOXIN 125 MCG: 125 TABLET ORAL at 17:19

## 2021-04-22 RX ADMIN — CLONAZEPAM 0.5 MG: 0.5 TABLET ORAL at 07:32

## 2021-04-22 RX ADMIN — METOPROLOL SUCCINATE 25 MG: 25 TABLET, EXTENDED RELEASE ORAL at 05:04

## 2021-04-22 RX ADMIN — MIDODRINE HYDROCHLORIDE 2.5 MG: 5 TABLET ORAL at 17:19

## 2021-04-22 RX ADMIN — SIMETHICONE 80 MG: 80 TABLET, CHEWABLE ORAL at 17:19

## 2021-04-22 RX ADMIN — ZIPRASIDONE HYDROCHLORIDE 60 MG: 60 CAPSULE ORAL at 08:28

## 2021-04-22 RX ADMIN — SIMETHICONE 80 MG: 80 TABLET, CHEWABLE ORAL at 05:04

## 2021-04-22 RX ADMIN — FLUDROCORTISONE ACETATE 0.1 MG: 0.1 TABLET ORAL at 05:04

## 2021-04-22 RX ADMIN — CLONAZEPAM 1 MG: 1 TABLET ORAL at 17:19

## 2021-04-22 RX ADMIN — SIMETHICONE 80 MG: 80 TABLET, CHEWABLE ORAL at 11:25

## 2021-04-22 RX ADMIN — POTASSIUM CHLORIDE 20 MEQ: 20 TABLET, EXTENDED RELEASE ORAL at 05:04

## 2021-04-22 RX ADMIN — APIXABAN 5 MG: 5 TABLET, FILM COATED ORAL at 19:16

## 2021-04-22 RX ADMIN — LEVOTHYROXINE SODIUM 112 MCG: 0.11 TABLET ORAL at 05:04

## 2021-04-22 RX ADMIN — APIXABAN 5 MG: 5 TABLET, FILM COATED ORAL at 07:32

## 2021-04-22 RX ADMIN — MIDODRINE HYDROCHLORIDE 2.5 MG: 5 TABLET ORAL at 11:24

## 2021-04-22 RX ADMIN — HYDROCORTISONE 10 MG: 10 TABLET ORAL at 05:04

## 2021-04-22 RX ADMIN — HYDROCORTISONE 5 MG: 10 TABLET ORAL at 15:13

## 2021-04-22 NOTE — PROGRESS NOTES
Sitter ordered by Cristy RICKS  Asked if tele sitter would be ok, tele sitter ok per Cristy Araiza, APRN

## 2021-04-23 PROCEDURE — A9270 NON-COVERED ITEM OR SERVICE: HCPCS | Performed by: NURSE PRACTITIONER

## 2021-04-23 PROCEDURE — 700102 HCHG RX REV CODE 250 W/ 637 OVERRIDE(OP): Performed by: STUDENT IN AN ORGANIZED HEALTH CARE EDUCATION/TRAINING PROGRAM

## 2021-04-23 PROCEDURE — A9270 NON-COVERED ITEM OR SERVICE: HCPCS | Performed by: HOSPITALIST

## 2021-04-23 PROCEDURE — 770001 HCHG ROOM/CARE - MED/SURG/GYN PRIV*

## 2021-04-23 PROCEDURE — 700102 HCHG RX REV CODE 250 W/ 637 OVERRIDE(OP): Performed by: NURSE PRACTITIONER

## 2021-04-23 PROCEDURE — 700102 HCHG RX REV CODE 250 W/ 637 OVERRIDE(OP): Performed by: HOSPITALIST

## 2021-04-23 PROCEDURE — A9270 NON-COVERED ITEM OR SERVICE: HCPCS | Performed by: STUDENT IN AN ORGANIZED HEALTH CARE EDUCATION/TRAINING PROGRAM

## 2021-04-23 RX ADMIN — SIMETHICONE 80 MG: 80 TABLET, CHEWABLE ORAL at 11:59

## 2021-04-23 RX ADMIN — PAROXETINE HYDROCHLORIDE 20 MG: 20 TABLET, FILM COATED ORAL at 09:15

## 2021-04-23 RX ADMIN — MIDODRINE HYDROCHLORIDE 2.5 MG: 5 TABLET ORAL at 16:23

## 2021-04-23 RX ADMIN — METOPROLOL SUCCINATE 12.5 MG: 25 TABLET, EXTENDED RELEASE ORAL at 05:10

## 2021-04-23 RX ADMIN — MIDODRINE HYDROCHLORIDE 2.5 MG: 5 TABLET ORAL at 11:59

## 2021-04-23 RX ADMIN — HYDROCORTISONE 5 MG: 10 TABLET ORAL at 15:12

## 2021-04-23 RX ADMIN — APIXABAN 5 MG: 5 TABLET, FILM COATED ORAL at 09:16

## 2021-04-23 RX ADMIN — SIMETHICONE 80 MG: 80 TABLET, CHEWABLE ORAL at 05:11

## 2021-04-23 RX ADMIN — CLONAZEPAM 0.5 MG: 0.5 TABLET ORAL at 09:15

## 2021-04-23 RX ADMIN — CLONAZEPAM 1 MG: 1 TABLET ORAL at 16:24

## 2021-04-23 RX ADMIN — FLUDROCORTISONE ACETATE 0.1 MG: 0.1 TABLET ORAL at 05:10

## 2021-04-23 RX ADMIN — MIDODRINE HYDROCHLORIDE 2.5 MG: 5 TABLET ORAL at 09:15

## 2021-04-23 RX ADMIN — ZIPRASIDONE HYDROCHLORIDE 60 MG: 60 CAPSULE ORAL at 20:22

## 2021-04-23 RX ADMIN — DIGOXIN 125 MCG: 125 TABLET ORAL at 16:23

## 2021-04-23 RX ADMIN — APIXABAN 5 MG: 5 TABLET, FILM COATED ORAL at 20:22

## 2021-04-23 RX ADMIN — ATORVASTATIN CALCIUM 40 MG: 40 TABLET, FILM COATED ORAL at 16:23

## 2021-04-23 RX ADMIN — LEVOTHYROXINE SODIUM 112 MCG: 0.11 TABLET ORAL at 05:11

## 2021-04-23 RX ADMIN — SIMETHICONE 80 MG: 80 TABLET, CHEWABLE ORAL at 16:23

## 2021-04-23 RX ADMIN — HYDROCORTISONE 10 MG: 10 TABLET ORAL at 05:10

## 2021-04-23 RX ADMIN — POTASSIUM CHLORIDE 20 MEQ: 20 TABLET, EXTENDED RELEASE ORAL at 05:10

## 2021-04-23 RX ADMIN — ZIPRASIDONE HYDROCHLORIDE 60 MG: 60 CAPSULE ORAL at 09:15

## 2021-04-23 ASSESSMENT — PAIN DESCRIPTION - PAIN TYPE
TYPE: ACUTE PAIN

## 2021-04-24 LAB — GLUCOSE BLD-MCNC: 114 MG/DL (ref 65–99)

## 2021-04-24 PROCEDURE — 700102 HCHG RX REV CODE 250 W/ 637 OVERRIDE(OP): Performed by: NURSE PRACTITIONER

## 2021-04-24 PROCEDURE — 97165 OT EVAL LOW COMPLEX 30 MIN: CPT

## 2021-04-24 PROCEDURE — A9270 NON-COVERED ITEM OR SERVICE: HCPCS | Performed by: STUDENT IN AN ORGANIZED HEALTH CARE EDUCATION/TRAINING PROGRAM

## 2021-04-24 PROCEDURE — 700102 HCHG RX REV CODE 250 W/ 637 OVERRIDE(OP): Performed by: STUDENT IN AN ORGANIZED HEALTH CARE EDUCATION/TRAINING PROGRAM

## 2021-04-24 PROCEDURE — 82962 GLUCOSE BLOOD TEST: CPT

## 2021-04-24 PROCEDURE — A9270 NON-COVERED ITEM OR SERVICE: HCPCS | Performed by: NURSE PRACTITIONER

## 2021-04-24 PROCEDURE — A9270 NON-COVERED ITEM OR SERVICE: HCPCS | Performed by: HOSPITALIST

## 2021-04-24 PROCEDURE — 700102 HCHG RX REV CODE 250 W/ 637 OVERRIDE(OP): Performed by: HOSPITALIST

## 2021-04-24 PROCEDURE — 99231 SBSQ HOSP IP/OBS SF/LOW 25: CPT | Performed by: NURSE PRACTITIONER

## 2021-04-24 PROCEDURE — 770001 HCHG ROOM/CARE - MED/SURG/GYN PRIV*

## 2021-04-24 PROCEDURE — 97164 PT RE-EVAL EST PLAN CARE: CPT

## 2021-04-24 RX ADMIN — DIGOXIN 125 MCG: 125 TABLET ORAL at 17:05

## 2021-04-24 RX ADMIN — LEVOTHYROXINE SODIUM 112 MCG: 0.11 TABLET ORAL at 05:46

## 2021-04-24 RX ADMIN — MIDODRINE HYDROCHLORIDE 2.5 MG: 5 TABLET ORAL at 11:52

## 2021-04-24 RX ADMIN — METOPROLOL SUCCINATE 12.5 MG: 25 TABLET, EXTENDED RELEASE ORAL at 05:46

## 2021-04-24 RX ADMIN — MIDODRINE HYDROCHLORIDE 2.5 MG: 5 TABLET ORAL at 07:49

## 2021-04-24 RX ADMIN — CLONAZEPAM 1 MG: 1 TABLET ORAL at 17:05

## 2021-04-24 RX ADMIN — HYDROCORTISONE 10 MG: 10 TABLET ORAL at 05:47

## 2021-04-24 RX ADMIN — FLUDROCORTISONE ACETATE 0.1 MG: 0.1 TABLET ORAL at 05:46

## 2021-04-24 RX ADMIN — ZIPRASIDONE HYDROCHLORIDE 60 MG: 60 CAPSULE ORAL at 19:13

## 2021-04-24 RX ADMIN — SIMETHICONE 80 MG: 80 TABLET, CHEWABLE ORAL at 17:05

## 2021-04-24 RX ADMIN — APIXABAN 5 MG: 5 TABLET, FILM COATED ORAL at 19:13

## 2021-04-24 RX ADMIN — POTASSIUM CHLORIDE 20 MEQ: 20 TABLET, EXTENDED RELEASE ORAL at 05:46

## 2021-04-24 RX ADMIN — SIMETHICONE 80 MG: 80 TABLET, CHEWABLE ORAL at 11:52

## 2021-04-24 RX ADMIN — SIMETHICONE 80 MG: 80 TABLET, CHEWABLE ORAL at 05:47

## 2021-04-24 RX ADMIN — APIXABAN 5 MG: 5 TABLET, FILM COATED ORAL at 07:49

## 2021-04-24 RX ADMIN — PAROXETINE HYDROCHLORIDE 20 MG: 20 TABLET, FILM COATED ORAL at 07:49

## 2021-04-24 RX ADMIN — CLONAZEPAM 0.5 MG: 0.5 TABLET ORAL at 07:49

## 2021-04-24 RX ADMIN — ATORVASTATIN CALCIUM 40 MG: 40 TABLET, FILM COATED ORAL at 17:05

## 2021-04-24 RX ADMIN — ZIPRASIDONE HYDROCHLORIDE 60 MG: 60 CAPSULE ORAL at 07:49

## 2021-04-24 RX ADMIN — HYDROCORTISONE 5 MG: 10 TABLET ORAL at 14:40

## 2021-04-24 ASSESSMENT — ENCOUNTER SYMPTOMS
PALPITATIONS: 0
SPEECH CHANGE: 0
FEVER: 0
DEPRESSION: 1
ABDOMINAL PAIN: 0
VOMITING: 0
SENSORY CHANGE: 0
NAUSEA: 0
NERVOUS/ANXIOUS: 1
WEAKNESS: 0
FOCAL WEAKNESS: 0
EYE PAIN: 0
COUGH: 0
SORE THROAT: 0
DIZZINESS: 0
INSOMNIA: 0
EYE DISCHARGE: 0
CONSTIPATION: 0
HEADACHES: 0
SHORTNESS OF BREATH: 0
DIARRHEA: 0
MYALGIAS: 0
ROS GI COMMENTS: COLOSTOMY IN PLACE

## 2021-04-24 ASSESSMENT — COGNITIVE AND FUNCTIONAL STATUS - GENERAL
CLIMB 3 TO 5 STEPS WITH RAILING: A LITTLE
DAILY ACTIVITIY SCORE: 22
SUGGESTED CMS G CODE MODIFIER MOBILITY: CJ
MOVING FROM LYING ON BACK TO SITTING ON SIDE OF FLAT BED: A LITTLE
SUGGESTED CMS G CODE MODIFIER DAILY ACTIVITY: CJ
MOBILITY SCORE: 21
TOILETING: A LITTLE
MOVING TO AND FROM BED TO CHAIR: A LITTLE
HELP NEEDED FOR BATHING: A LITTLE

## 2021-04-24 ASSESSMENT — GAIT ASSESSMENTS
DISTANCE (FEET): 200
ASSISTIVE DEVICE: 4 WHEEL WALKER
GAIT LEVEL OF ASSIST: SUPERVISED
DEVIATION: BRADYKINETIC;SHUFFLED GAIT

## 2021-04-24 ASSESSMENT — ACTIVITIES OF DAILY LIVING (ADL): TOILETING: INDEPENDENT

## 2021-04-24 NOTE — THERAPY
Physical Therapy   Initial Evaluation     Patient Name: Sebastián Castro  Age:  56 y.o., Sex:  male  Medical Record #: [id number]  Today's Date: 4/24/2021     Precautions: Fall Risk 2/2 orthostatic hx;     Assessment  Pt presents with same functional mobility as last dc from service on 3/29; completes all functional mobility with supervision and 4WW; did report one episode of dizziness, no objective signs of instability, /90, able to ambulate back to room; given length of stay here (74 days) assume he is medically optimized from a HDR perspective; pt's greatest barriers to home safety are self volition, found covered in urine and incontinent of urine despite pt reporting knowing when he needs to go, and for that reason would recommend at least intermittent supervision for medications and IADLs due to disengagement in self care; pt reporting he lives at group home without stairs, from chart review difficult to discern whether he is speaking of [facility] or [facility] group home; from a PT perspective, pt does not require further acute PT but should be mobilizing to chair, restroom and hallway with RN staff to maintain mobility; will not follow, reconsult should condition change recommend dc back to group home/long term care SNF/psych facility as medically appropriate.      Plan    Recommend Physical Therapy for Evaluation only    DC Equipment Recommendations:  None  Discharge Recommendations:  (group home/psych facility )       Abridged Subjective/Objective     04/24/21 1035   Prior Living Situation   Prior Services None   Housing / Facility Group Home   Steps Into Home 0   Rail Both Rail (Steps into Home)   Equipment Owned 4-Wheel Walker   Comments pt reports he was at a group home 1 levle, no stairs off [address]; denies falls there; reports 'they' can help with medicaitons and provide meals; pt denies stairs to this therapist    Prior Level of Functional Mobility   Bed Mobility Independent   Transfer Status  Independent   Ambulation Independent   Distance Ambulation (Feet)   (limited household)   Assistive Devices Used 4-Wheel Walker   History of Falls   History of Falls   (denies recent falls to this therapist)   Cognition    Cognition / Consciousness X   Level of Consciousness Alert   New Learning Impaired   Attention Impaired   Comments flat affect; perks up when discussing fishing; does not speak of SI to this therapist;    Passive ROM Lower Body   Passive ROM Lower Body WDL   Strength Lower Body   Rt Knee Extension Strength 5 (N)   Lt Knee Extension Strength 5 (N)   Sensation Lower Body   Lower Extremity Sensation   X   Comments right foot dorsum insensate per pt but can tell light touch; left LE reports neuropathy but can feel light touch    Balance Assessment   Sitting Balance (Static) Good   Sitting Balance (Dynamic) Good   Standing Balance (Static) Fair +   Standing Balance (Dynamic) Fair +   Weight Shift Sitting Good   Weight Shift Standing Fair   Comments B UE support in sitting/standing; no loss of balance in moving environment; can walk short distances without 4Ww, does not impact balance    Gait Analysis   Gait Level Of Assist Supervised   Assistive Device 4 Wheel Walker   Distance (Feet) 200   # of Times Distance was Traveled 2   Deviation Bradykinetic;Shuffled Gait   Level of Assist with Stairs Supervised   Weight Bearing Status no restrictions   Vision Deficits Impacting Mobility denies   Comments did well until stopped in hallway reporting dizziness though no obejctve signs of stability; sat in 4WW, /90; able to walk back; incontinent of urine but appears effort related as he does report he knows when he needs to pee, was also found with significant urine under backside with no volition to call for help;    Bed Mobility    Supine to Sit Modified Independent  (slight raised HOB)   Sit to Supine Modified Independent   Functional Mobility   Sit to Stand Supervised  (with/without 4WW )   Bed, Chair,  Wheelchair Transfer Supervised  (with/without 4WW)   Transfer Method Other (Comments)  (via ambulation)   Comments sit<>stand x 4 reps during session, no assist needed    Education Group   Role of Physical Therapist Patient Response Patient;Acceptance;Explanation;Verbal Demonstration

## 2021-04-24 NOTE — CARE PLAN
Problem: Communication  Goal: The ability to communicate needs accurately and effectively will improve  Outcome: PROGRESSING AS EXPECTED  Note: Pt expresses needs appropriately. White board is updated with current shift information. Telesitter camera in room for patient safety.      Problem: Mobility  Goal: Risk for activity intolerance will decrease  Outcome: PROGRESSING AS EXPECTED  Note: Patient ambulates with assistance via front wheeled walker.

## 2021-04-24 NOTE — THERAPY
Occupational Therapy   Re- Evaluation     Patient Name: Sebastián Castro  Age:  56 y.o., Sex:  male  Medical Record #: [id number]  Today's Date: 4/24/2021     Precautions  Precautions: Fall Risk  Comments: intermittent orthostatic hypotension    Assessment  Patient is 56 y.o. male was seen for re-evaluation for d/c disposition recommendations. Pt was able to perform basic self care tasks, functional mobility and t/f's with supervision/sba level except for ostomy management, reports able to empty his ostomy bag but hasn't been trained on changing the appliance, working with wound care. Pt was found laying in his urine in bed with no initiation to clean up, when asked if he knows when he needs to go, vocalizes yes. Pt demonstrates flat affect, appears disengaged with ADLs and engages with ADLs only when prompted. Pt has been seen by OT throughout his admission and remains at same functioning level and is limited by his underlying psych deficits, will recommend group home level of care upon d/c, pt doesn't demonstrate internal volition or initiation to engage in ADLs despite having physical capabilities to complete it.     Plan    Recommend Occupational Therapy for Evaluation only     DC Equipment Recommendations: None  Discharge Recommendations: Other -(Needs group home, psych facility)      Objective       04/24/21 0948   Prior Living Situation   Prior Services None   Housing / Facility Group Home   Bathroom Set up Bathtub / Shower Combination   Equipment Owned 4-Wheel Walker   Lives with - Patient's Self Care Capacity Other (Comments)  (Group Home)   Comments At initail evaluation, pt was admitted from Group home, where staff can help with medications and povides meals   Prior Level of ADL Function   Self Feeding Independent   Grooming / Hygiene Independent   Bathing Independent   Dressing Independent   Toileting Independent   Cognition    Cognition / Consciousness X   Level of Consciousness Alert   New Learning Impaired    Attention Impaired   Comments agreeable to session, engages with ADLs when prompted to attempt, flat affect    Balance Assessment   Sitting Balance (Static) Good   Sitting Balance (Dynamic) Fair +   Standing Balance (Static) Fair +   Standing Balance (Dynamic) Fair +   Weight Shift Sitting Good   Weight Shift Standing Fair   Comments w/4WW, no lob noted   Bed Mobility    Supine to Sit Supervised   Sit to Supine Supervised   Scooting Supervised   Rolling Supervised   Comments flat hob   ADL Assessment   Eating Modified Independent   Grooming Supervision;Standing;Seated  (sits on 4WW as needed)   Bathing   (declined)   Upper Body Dressing Supervision   Lower Body Dressing   (declined, reports has his own method of donning socks as nee)   Toileting   (reports he's been emptying his own ostomy)   Comments reports working with wound care for ostomy management   Functional Mobility   Sit to Stand Supervised   Bed, Chair, Wheelchair Transfer Supervised   Toilet Transfers Supervised   Transfer Method Stand Step   Mobility within room and hallway   Comments w/4WW, no lob noted   Anticipated Discharge Equipment and Recommendations   DC Equipment Recommendations None

## 2021-04-24 NOTE — PROGRESS NOTES
Hospital Medicine Twice Weekly Progress Note    Date of Service  4/24/2021      Chief Complaint  Suicidal ideation    Hospital Course  Mr. Castro is a 56 year-old man with a past medical history significant for atrial fibrillation, secondary hypercoagulable state, diabetes mellitus complicated by diabetic neuropathy, chronic systolic CHF, colostomy placement, orthostatic hypotension (maintained on florinef), and dyslipidemia who presented to the ED on 2/9/2021 after he became dizzy and had a syncopal episode at home.  In the ED he was noted to be hypotensive and bradycardic. Cardiology was consulted and recommended stopping all AV sanket blocking medications.  He remained orthostatic during his hospitalization so midodrine was started.  An echocardiogram was obtained and showed mild mitral regurgitation and an EF of 60% which was improved from his previous echocardiogram obtained on 11/2020 (previously 35%).  CTAs of the head and neck were done and were unremarkable outside of distal bilateral ICA tortuosity.  Also during his hospitalization he expressed desire to kill himself.  He has a colostomy and has become overwhelmed by caring for it.  He was subsequently placed on a legal hold and psychiatry was consulted.  He is now pending [facility] placement at the 6-month db for maturity of his colostomy.    Interval Problem Update  4/20- Patient's civil commitment was discontinued this morning.  Received message from nursing that patient again stating suicidal thoughts to staff.  On discussion with patient he states he is not suicidal at this time and has no intention of hurting himself, that he is just depressed related to his colostomy and having trouble dealing with his medical issues.  Patient reassured provider that he is not suicidal at this time.  Patient encouraged to focus on positive thoughts.  Nursing contacted psychiatry who state legal hold is not necessary at this time.   4/21- Patient again stating he is  "having suicidal ideations and expressed to nursing that he has a plan to slit his throat. Discussed patient with Psychiatry and agree with her assessment that patient makes statements but his actions are not coinciding with his expressions.  Went to bedside and discussed with patient his comments.  Patient directly stated \" I am having those suicidal ideations again\".  Asked patient point blank if he wanted to live at which he stated \" yes I want to live\".  Talked at length with patient regarding possible thoughts that would enter his head and ability to focus on positive aspects.  At end of conversation patient states he had no intention of committing suicide and will focus on getting well.  Questionable secondary gain as motive for daily expression of suicidal ideation.   4/24- Patient resting in bed, states he continues to have suicidal thoughts because he is upset at having a colostomy, but then states he wants to live and get better. PT  Re-evaluated patient and feel he is at baseline and ok to return to group home. Will discuss with case management on availability to return to former living facility. Discontinued midodrine, blood pressure remaining elevated with lower dose metoprolol.       Consultants/Specialty  Cardiology  Psychiatry  General surgery    Code Status  Full Code    Disposition  TBD- Civil commitment discontinued-working on discharge with case management    Review of Systems  Review of Systems   Constitutional: Negative for fever and malaise/fatigue.   HENT: Negative for congestion and sore throat.    Eyes: Negative for pain and discharge.   Respiratory: Negative for cough and shortness of breath.    Cardiovascular: Positive for leg swelling. Negative for chest pain and palpitations.   Gastrointestinal: Negative for abdominal pain, constipation, diarrhea, nausea and vomiting.        Colostomy in place    Genitourinary: Negative for dysuria, frequency and urgency.   Musculoskeletal: Negative for " myalgias.   Neurological: Negative for dizziness, sensory change, speech change, focal weakness, weakness and headaches.   Psychiatric/Behavioral: Positive for depression. Negative for suicidal ideas. The patient is nervous/anxious. The patient does not have insomnia.       Physical Exam  Temp:  [36.1 °C (97 °F)-36.4 °C (97.6 °F)] 36.4 °C (97.6 °F)  Pulse:  [68-79] 68  Resp:  [18] 18  BP: (114-141)/(54-73) 114/54  SpO2:  [92 %-94 %] 93 %    Physical Exam  Vitals and nursing note reviewed.   Constitutional:       General: He is awake. He is not in acute distress.     Appearance: Normal appearance. He is well-developed. He is not ill-appearing.   HENT:      Head: Normocephalic and atraumatic.      Mouth/Throat:      Lips: Pink.      Mouth: Mucous membranes are moist.   Eyes:      General: Lids are normal.      Conjunctiva/sclera: Conjunctivae normal.      Pupils: Pupils are equal, round, and reactive to light.   Cardiovascular:      Rate and Rhythm: Normal rate. Rhythm irregularly irregular.      Pulses: Normal pulses.      Heart sounds: Normal heart sounds.   Pulmonary:      Effort: Pulmonary effort is normal.      Breath sounds: Normal breath sounds. No decreased breath sounds or wheezing.   Abdominal:      General: Bowel sounds are normal. There is no distension.      Palpations: Abdomen is soft.      Tenderness: There is no abdominal tenderness.       Musculoskeletal:      Cervical back: Neck supple.      Right lower leg: Edema (stable today) present.      Left lower leg: Edema (stable today) present.   Skin:     General: Skin is warm and dry.      Coloration: Skin is pale.   Neurological:      General: No focal deficit present.      Mental Status: He is alert.   Psychiatric:         Attention and Perception: Attention and perception normal.         Mood and Affect: Affect normal. Mood is depressed.         Speech: Speech normal.         Behavior: Behavior normal. Behavior is cooperative.         Thought Content:  Thought content does not include suicidal ideation. Thought content does not include suicidal plan.         Cognition and Memory: Memory normal.      Comments: Patient states depressed secondary to colostomy and further need for it, stated to provider that he has no intention of harming himself and is not suicidal at this time.      Exam unchanged from previous on 4/21    Fluids    Intake/Output Summary (Last 24 hours) at 4/24/2021 1333  Last data filed at 4/24/2021 0819  Gross per 24 hour   Intake 1480 ml   Output 1600 ml   Net -120 ml     Laboratory          Imaging  [id number] VIEW   Final Result         No significant interval change.      DX-CHEST-LIMITED (1 VIEW)   Final Result         Diffuse interstitial prominence could relate to mild pulmonary edema or atypical infection      [id number] VIEW   Final Result      Increased colonic gas without definite bowel obstruction.      US-RENAL   Final Result      No evidence of hydronephrosis.      Lobulated kidneys bilaterally.      CT-ABDOMEN-PELVIS WITH   Final Result      1.  There is no evidence of small bowel obstruction.   2.  There is a left lower quadrant ostomy.   3.  There is fluid distention of the colon distal to the surgical material in the left mid descending colon extending all the way to the rectum. There is no pneumatosis or free air.   4.  There is cholelithiasis without biliary dilatation.   5.  There has been interval removal of the drainage catheter anterior to the spleen with minimal hypodense area in the anterior spleen again noted. There is no new fluid collection.      IR-US GUIDED PIV   Final Result    Ultrasound-guided PERIPHERAL IV INSERTION performed by    qualified nursing staff as above.      EC-ECHOCARDIOGRAM COMPLETE W/O CONT   Final Result      DX-LUMBAR SPINE-2 OR 3 VIEWS   Final Result      Moderate compression deformity of T11 is new compared to 2018.      Mild wedge deformity of T12 is unchanged.      Degenerative changes  "including facet arthropathy.      Mild retrolisthesis of L5 on S1 and L3 on L4.            Assessment/Plan  * Suicidal ideation- (present on admission)  Assessment & Plan  -Has history of MDD, recurrent with psychotic features. active SI w/ plan on admission  -Psychiatry following, recommended discontinuation of civil commitment - suspect possible malingering or secondary gain   - Evaluated bedside again and discussed with patient who states he \"wants to live\"   -Psych recommending sitter to ensure patient does not attempt actions that would extend his stay     Orthostatic hypotension- (present on admission)  Assessment & Plan  -Multifactorial including adrenal insufficiency, medications (beta blocker, CCB, tamsulosin), diabetic autonomic dysfunction, and venous insufficiency.  -EF is noted to be 60%, significant improvement from prior (previously 35%).  -Per cardiology, no further cardiac work-up needed.  Recommends follow-up with them as outpatient.  -Fall precautions.  -Continue to educate on the importance of slow positional changes.   - Started PO hydrocortisone 4/7/2021 for AI. Continuing florinef.   -Decreased toprolol which allowed for discontinuation of midodrine  Improved     Paroxysmal atrial fibrillation (HCC)- (present on admission)  Assessment & Plan  -CHADSVASC 3 (HTN, CHF, DM).   -TTE Feb 2021: EF 60%.  -Continue metoprolol, digoxin, and eliquis.    Chronic heart failure with preserved ejection fraction (HCC)- (present on admission)  Assessment & Plan  -Now stable.  -Pulmonary edema noted in mid-March, was placed on IV diuresis though that was held when he developed a gram-negative gina bacteremia and sepsis.  Currently doing well from a respiratory standpoint, no shortness of breath, lungs clear to auscultation bilaterally, not requiring oxygen supplementation.  -Repeat echocardiogram showed improvement in his ejection fraction to 60%.    -Continue metoprolol succinate.  -Lisinopril held per " cardiology recommendations due to hypotension.    Diabetes mellitus type 2, insulin dependent (HCC)- (present on admission)  Assessment & Plan  -A1c 5.2% on 2/14/2021.  -Continue diabetic diet.  -Continue lantus at current dose   -Hypoglycemia protocol in place if needed.      Major depressive disorder, recurrent, severe with psychotic features (HCC)- (present on admission)  Assessment & Plan  -Management as listed under 'anxiety and depression'.    Anxiety and depression- (present on admission)  Assessment & Plan  -Legal hold extended, awaiting acceptance at psych facility ([facility] will take in May).  -Remains expressing suicidal ideations but actions contradict.   -Psychiatry signed off feel he is malingering   -Continue paxil, klonopin, & geodon.     Hypothyroidism- (present on admission)  Assessment & Plan  -TFTs in Feb were WNL. Continue levothyroxine at current dose.     Adrenal insufficiency (HCC)- (present on admission)  Assessment & Plan  -Started PO hydrocortisone 4/7/2021. Continue florinef.   -Needs outpatient endocrinology follow up.  -Monitor potassium while on florinef. 4.4 on 4/7/2021.  -Repeat orthostatics periodically if symptomatic         Obstructive uropathy- (present on admission)  Assessment & Plan  -Trial off of flomax given his hypotension.  Discontinued 4/6/2021. No retention thus far.     Chronic venous insufficiency of lower extremity- (present on admission)  Assessment & Plan  -Continue compression stockings.    Colostomy present (HCC)- (present on admission)  Assessment & Plan  -History of fall at home Nov 2020 on left side with perforated colon and splenic fluid collection/hematoma s/p segmental colectomy, colostomy, mobilization of the splenic flexure, wound vac, and I&D Nov 2020.  -Wound care consulted for ostomy care, but he was non-participatory. He is now engaging in ostomy care with his bedside RNs, encouraged to be observant but do it independently.  -Patient must be independently  able to care for his ostomy before inpatient psychiatry will accept him.  Per surgery Dr. Desouza, ostomy reversal is high risk given patient's complexities. Plan for close multidisciplinary outpatient follow-up for surgery when patients overall medical conditions are more optimized.    Debility- (present on admission)  Assessment & Plan  -Cleared by PT/OT.  -Out of bed for each meal and ambulate in the hallways at least twice per shift.    Mixed hyperlipidemia- (present on admission)  Assessment & Plan  Lab Results   Component Value Date/Time    CHOLSTRLTOT 146 02/14/2021 01:30 AM    LDL 82 02/14/2021 01:30 AM    HDL 32 (A) 02/14/2021 01:30 AM    TRIGLYCERIDE 160 (H) 02/14/2021 01:30 AM   -Continue atorvastatin.     Lower limb amputation, great toe (HCC)- (present on admission)  Assessment & Plan  -History of, 2018.  Site well-healed.    Class 2 severe obesity with serious comorbidity and body mass index (BMI) of 35.0 to 35.9 in adult (HCC)- (present on admission)  Assessment & Plan  Body mass index is 35.19 kg/m².-Trending down.  -Could benefit from outpatient weight loss counseling which can be arranged through his PCP after hospital discharge.       VTE prophylaxis: Eliquis

## 2021-04-25 LAB — GLUCOSE BLD-MCNC: 152 MG/DL (ref 65–99)

## 2021-04-25 PROCEDURE — 770001 HCHG ROOM/CARE - MED/SURG/GYN PRIV*

## 2021-04-25 PROCEDURE — A9270 NON-COVERED ITEM OR SERVICE: HCPCS | Performed by: STUDENT IN AN ORGANIZED HEALTH CARE EDUCATION/TRAINING PROGRAM

## 2021-04-25 PROCEDURE — A9270 NON-COVERED ITEM OR SERVICE: HCPCS | Performed by: HOSPITALIST

## 2021-04-25 PROCEDURE — A9270 NON-COVERED ITEM OR SERVICE: HCPCS | Performed by: NURSE PRACTITIONER

## 2021-04-25 PROCEDURE — 700102 HCHG RX REV CODE 250 W/ 637 OVERRIDE(OP): Performed by: NURSE PRACTITIONER

## 2021-04-25 PROCEDURE — 700102 HCHG RX REV CODE 250 W/ 637 OVERRIDE(OP): Performed by: STUDENT IN AN ORGANIZED HEALTH CARE EDUCATION/TRAINING PROGRAM

## 2021-04-25 PROCEDURE — 700102 HCHG RX REV CODE 250 W/ 637 OVERRIDE(OP): Performed by: HOSPITALIST

## 2021-04-25 PROCEDURE — 82962 GLUCOSE BLOOD TEST: CPT

## 2021-04-25 RX ADMIN — ATORVASTATIN CALCIUM 40 MG: 40 TABLET, FILM COATED ORAL at 16:50

## 2021-04-25 RX ADMIN — CLONAZEPAM 1 MG: 1 TABLET ORAL at 16:50

## 2021-04-25 RX ADMIN — LEVOTHYROXINE SODIUM 112 MCG: 0.11 TABLET ORAL at 04:48

## 2021-04-25 RX ADMIN — HYDROCORTISONE 5 MG: 10 TABLET ORAL at 16:49

## 2021-04-25 RX ADMIN — PAROXETINE HYDROCHLORIDE 20 MG: 20 TABLET, FILM COATED ORAL at 08:33

## 2021-04-25 RX ADMIN — METOPROLOL SUCCINATE 12.5 MG: 25 TABLET, EXTENDED RELEASE ORAL at 04:48

## 2021-04-25 RX ADMIN — ZIPRASIDONE HYDROCHLORIDE 60 MG: 60 CAPSULE ORAL at 20:31

## 2021-04-25 RX ADMIN — CLONAZEPAM 0.5 MG: 0.5 TABLET ORAL at 08:33

## 2021-04-25 RX ADMIN — HYDROCORTISONE 10 MG: 10 TABLET ORAL at 04:48

## 2021-04-25 RX ADMIN — DIGOXIN 125 MCG: 125 TABLET ORAL at 16:50

## 2021-04-25 RX ADMIN — POTASSIUM CHLORIDE 20 MEQ: 20 TABLET, EXTENDED RELEASE ORAL at 04:48

## 2021-04-25 RX ADMIN — SIMETHICONE 80 MG: 80 TABLET, CHEWABLE ORAL at 16:50

## 2021-04-25 RX ADMIN — ZIPRASIDONE HYDROCHLORIDE 60 MG: 60 CAPSULE ORAL at 08:33

## 2021-04-25 RX ADMIN — INSULIN GLARGINE 5 UNITS: 100 INJECTION, SOLUTION SUBCUTANEOUS at 04:50

## 2021-04-25 RX ADMIN — SIMETHICONE 80 MG: 80 TABLET, CHEWABLE ORAL at 11:25

## 2021-04-25 RX ADMIN — APIXABAN 5 MG: 5 TABLET, FILM COATED ORAL at 20:31

## 2021-04-25 RX ADMIN — SIMETHICONE 80 MG: 80 TABLET, CHEWABLE ORAL at 04:47

## 2021-04-25 RX ADMIN — FLUDROCORTISONE ACETATE 0.1 MG: 0.1 TABLET ORAL at 04:48

## 2021-04-25 RX ADMIN — APIXABAN 5 MG: 5 TABLET, FILM COATED ORAL at 08:33

## 2021-04-25 ASSESSMENT — FIBROSIS 4 INDEX: FIB4 SCORE: 0.92

## 2021-04-25 NOTE — CARE PLAN
Problem: Knowledge Deficit  Goal: Knowledge of disease process/condition, treatment plan, diagnostic tests, and medications will improve  Outcome: PROGRESSING AS EXPECTED  Note: Plan of care for the evening was discussed with patient. Questions were answered to patient satisfaction regarding his care.      Problem: Pain Management  Goal: Pain level will decrease to patient's comfort goal  Outcome: PROGRESSING AS EXPECTED  Note: Patient continues to report not experiencing chronic/acute pain issues this evening.

## 2021-04-25 NOTE — WOUND TEAM
"  [facility]   Inpatient Services   Established Ostomy Management/ troubleshooting  HPI: Reviewed  PMH: Reviewed   SH: Reviewed   Reason for Ostomy nurse consult:  Established colostomy        Objective: appliance intact. Sitter in place.      Colostomy 11/10/20 Transverse LUQ (Active)   Wound Image   04/02/21 2300   Stomal Appliance Assessment Changed    Stoma Assessment Pink    Stoma Shape Oval;Budded Less Than One Inch    Stoma Size (in) 1.25    Peristomal Assessment Intact    Mucocutaneous Junction Intact    Treatment Appliance Changed;Cleansed with water/washcloth;Site care    Peristomal Protectant Paste Ring    Stomal Appliance 2 1/4\" (57mm) CTF;Paste Ring, 2\"    Output (mL) 100 mL    Output Color Brown    WOUND RN ONLY - Stomal Appliance  2 Piece;Paste Ring, 2\";2 1/4\" Moldable    Appliance Brand [employer]    Ostomy Care Resources Provided [facility] Tip Sheet              Ostomy Appliance (type and size): 2 1/4\" Formaflex barrier with pouch and Paste Ring,      Interventions and Education: Patient removed appliance. Pt cleansed peristomal skin and stoma with some assistance from this RN. Pt did stretched barrier but this RN smoothed barrier out. Pt applied paste ring to barrier and then applied to skin. Pt then attached pouch to barrier, ostomy RN assisted with fully attaching pouch. Pt then closed pouch end.    Evaluation:   4/25: Pt very down today. Pt able to do some steps but needs help from RN still. Pt requested that MD reconsiders takedown after pt attempted to wrap light cord around his neck. Updated bedside RN to discuss with MD.     4/21: Pt continuing with hands on care. Expressed some difficulty attaching pouch, however, performs well with formaflex barrier.    4/15: used formaflex (moldable) barrier today which pt prefers to use. Pt stated he needs another education session because he needs more practice. Ostomy RN will continue to meet with pt.    4/7/21:  Pt reports he has been " burping pouch and is capable of changing appliance. Pt does have difficulty cutting barrier. May be able to transition to moldable at next change. Pt appears in better spirits today. Ostomy RN to remain available for education.     03/31/21: patient capable of changing ostomy but still having emotional difficulty with it.  Wound team will continue to follow 1-2 times weekly.  Patient not appropriate for surgical reversal at this time.         3/15/21:  Pt being transferred to T8 related to bacteremia and allergic reaction to zosyn overnight. Appliance working appropriately. Skin intact. Will plan to sign off and be available PRN.     3/11/21: Patient formed own formaflex barrier pouch and paste ring, assisted in cleansing. Feeling better and willing to continue to get more hands on. Likes the formaflex because he is actually able to form it to his size. Still has not performed independently.      3/8/21: Minimal participation, he attempts to do the steps with his eyes closed and minimal effort with his hands. Not sure if he lacks the fine motor movement or just going through the motions due to his expressed hatred of the colostomy. Patient has yet to perform all steps independently.     3/5/21: Pt did not participate in education today. Pt answered questions appropriately, but remained with his eyes closed through the duration of ostomy care. Declined hands on education when offered. Reported he was not feeling good. Formaflex barrier brought in today for pt as these do no require cutting with scissors. Extra barrier left in bag. CT of abd obtained yesterday did not reveal obstruction.     3/1/21: Viable stoma, producing stool. Per CNA report at bedside, appliance was leaking earlier and CNA changed appliance. Patient observed all steps today and participated with hands on care and did well. Discussed with MD on possibility for reversal. Per surgery, reversal of ostomy is an outpatient procedure that he will have to  follow-up with in the future after discharge     2/26/21: Pt very down today, reports that psych has not seen him. Pt kept turning head away from ostomy today but did verbalized he will try to do hands on Sunday.      2/24/21: Pt unable to transfer to inpatient psych until he can care for colostomy independently. Ostomy RNs were asked to return for education. Pt extremely depressed today. Pt states he feels like an animal and that the colostomy is messy and smelly and he would rather die then care for it. This RN provided understanding and attempted to explain that colostomy is manageable. 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 Pt had colostomy created on November 10, 2020 by Dr. Desouza. May want to consider contacting surgery for possible reversal related to pts extreme body dysmorphia (this is pts 2nd admit for SI related to colostomy) and inability for pt to DC due to pts inability/unwillingness to care for colostomy - Discussed with Dr. Ley ([facility] Resident).      Plan: Wound team to continue with teaching every 3-4 days to help teach patient how to care for appliance so that he can discharge to psych.     Anticipated discharge needs: patient would benefit from SNF, outpatient clinic or Home health for follow up care. Patient needs to follow up with Dr. Desouza after discharge to plan for ostomy reversal surgery.

## 2021-04-25 NOTE — CARE PLAN
Problem: Communication  Goal: The ability to communicate needs accurately and effectively will improve  Outcome: PROGRESSING AS EXPECTED  Note: Pt has been educated on importance for appropriately calling for assistance or needs with call light, any questions/concerns answered at this time. Call light close by, will check on patient hourly. One to one sitter in place.       Problem: Safety  Goal: Will remain free from injury  Outcome: PROGRESSING AS EXPECTED  Note: Discussed patient mobility status with interdisciplinary team, fall precautions in place, pt fall prevention education done, pt educated to call for assistance when needed. One to one sitter in place    Goal: Will remain free from falls  Outcome: PROGRESSING AS EXPECTED     Problem: Bowel/Gastric:  Goal: Normal bowel function is maintained or improved  Outcome: PROGRESSING AS EXPECTED  Goal: Will not experience complications related to bowel motility  Outcome: PROGRESSING AS EXPECTED     Problem: Discharge Barriers/Planning  Goal: Patient's continuum of care needs will be met  Outcome: PROGRESSING SLOWER THAN EXPECTED     Problem: Psychosocial Needs:  Goal: Level of anxiety will decrease  Outcome: PROGRESSING AS EXPECTED  Note: Pt reports feeling better after restraints were taken off.     Problem: Mobility  Goal: Risk for activity intolerance will decrease  Outcome: PROGRESSING AS EXPECTED  Note: Discussed plan to mobilize to chair, active range of motion with resistance, educated on benefits of mobilization, risks of activity intolerance.       Problem: Skin Integrity  Goal: Risk for impaired skin integrity will decrease  Outcome: PROGRESSING AS EXPECTED

## 2021-04-25 NOTE — PROGRESS NOTES
Dr. Villeda returned page and was updated on patient's behavior. Orders for 1:1 safety sitter and bilateral wrist restraints were received and initiated. Purpose of restraints explained to patient.

## 2021-04-25 NOTE — PROGRESS NOTES
Monitor room called stating patient was attempting to wrap call light cord around his neck. I entered the room, removed the cord immediately, and had Johnna RN  doorway while I updated VINNIE Haddad. All gloves and miscellanous items have been removed from patient room once again. Telesitter camera remains in place. When asked, patient attributed attempt to hating his ostomy.    Paging Dr. Villeda, hospitalist on call, for updates on patient.

## 2021-04-25 NOTE — PROGRESS NOTES
Earlier this evening, patient denied experiencing active suicidal ideation. Patient just called me into the room to discuss how he was feeling upon waking up this morning. Patient acknowledged a return of his suicidal ideation thoughts but denied an active plan to harm himself. Coping strategies were discussed. Telesitter camera remains in room for patient safety.

## 2021-04-25 NOTE — PROGRESS NOTES
"Report received from Car PÉREZ and care of patient assumed. Telesitter present in room for patient safety. Patient denies active SI at this time and reports \"feeling a lot better\". Call light within reach. Will continue to monitor.  "

## 2021-04-26 LAB
GLUCOSE BLD-MCNC: 117 MG/DL (ref 65–99)
GLUCOSE BLD-MCNC: 117 MG/DL (ref 65–99)

## 2021-04-26 PROCEDURE — 700102 HCHG RX REV CODE 250 W/ 637 OVERRIDE(OP): Performed by: STUDENT IN AN ORGANIZED HEALTH CARE EDUCATION/TRAINING PROGRAM

## 2021-04-26 PROCEDURE — 700102 HCHG RX REV CODE 250 W/ 637 OVERRIDE(OP): Performed by: HOSPITALIST

## 2021-04-26 PROCEDURE — A9270 NON-COVERED ITEM OR SERVICE: HCPCS | Performed by: NURSE PRACTITIONER

## 2021-04-26 PROCEDURE — A9270 NON-COVERED ITEM OR SERVICE: HCPCS | Performed by: STUDENT IN AN ORGANIZED HEALTH CARE EDUCATION/TRAINING PROGRAM

## 2021-04-26 PROCEDURE — 700102 HCHG RX REV CODE 250 W/ 637 OVERRIDE(OP): Performed by: NURSE PRACTITIONER

## 2021-04-26 PROCEDURE — A9270 NON-COVERED ITEM OR SERVICE: HCPCS | Performed by: HOSPITALIST

## 2021-04-26 PROCEDURE — 770001 HCHG ROOM/CARE - MED/SURG/GYN PRIV*

## 2021-04-26 PROCEDURE — 82962 GLUCOSE BLOOD TEST: CPT

## 2021-04-26 RX ADMIN — HYDROCORTISONE 10 MG: 10 TABLET ORAL at 04:23

## 2021-04-26 RX ADMIN — FLUDROCORTISONE ACETATE 0.1 MG: 0.1 TABLET ORAL at 04:23

## 2021-04-26 RX ADMIN — ATORVASTATIN CALCIUM 40 MG: 40 TABLET, FILM COATED ORAL at 16:36

## 2021-04-26 RX ADMIN — CLONAZEPAM 1 MG: 1 TABLET ORAL at 16:36

## 2021-04-26 RX ADMIN — DIGOXIN 125 MCG: 125 TABLET ORAL at 16:36

## 2021-04-26 RX ADMIN — LEVOTHYROXINE SODIUM 112 MCG: 0.11 TABLET ORAL at 04:22

## 2021-04-26 RX ADMIN — METOPROLOL SUCCINATE 12.5 MG: 25 TABLET, EXTENDED RELEASE ORAL at 04:22

## 2021-04-26 RX ADMIN — INSULIN GLARGINE 5 UNITS: 100 INJECTION, SOLUTION SUBCUTANEOUS at 04:22

## 2021-04-26 RX ADMIN — PAROXETINE HYDROCHLORIDE 20 MG: 20 TABLET, FILM COATED ORAL at 07:19

## 2021-04-26 RX ADMIN — SIMETHICONE 80 MG: 80 TABLET, CHEWABLE ORAL at 16:36

## 2021-04-26 RX ADMIN — SIMETHICONE 80 MG: 80 TABLET, CHEWABLE ORAL at 12:02

## 2021-04-26 RX ADMIN — POTASSIUM CHLORIDE 20 MEQ: 20 TABLET, EXTENDED RELEASE ORAL at 04:23

## 2021-04-26 RX ADMIN — CLONAZEPAM 0.5 MG: 0.5 TABLET ORAL at 07:19

## 2021-04-26 RX ADMIN — APIXABAN 5 MG: 5 TABLET, FILM COATED ORAL at 20:20

## 2021-04-26 RX ADMIN — APIXABAN 5 MG: 5 TABLET, FILM COATED ORAL at 07:19

## 2021-04-26 RX ADMIN — ZIPRASIDONE HYDROCHLORIDE 60 MG: 60 CAPSULE ORAL at 07:19

## 2021-04-26 RX ADMIN — ZIPRASIDONE HYDROCHLORIDE 60 MG: 60 CAPSULE ORAL at 20:20

## 2021-04-26 RX ADMIN — HYDROCORTISONE 5 MG: 10 TABLET ORAL at 15:46

## 2021-04-26 RX ADMIN — SIMETHICONE 80 MG: 80 TABLET, CHEWABLE ORAL at 04:22

## 2021-04-26 NOTE — DISCHARGE PLANNING
Anticipated Discharge Disposition:   TBD    Action:   Discussed discharge planning needs with APRN. Per APRN, plan to talk with Psych.     Per chart review, pt was previously discharged to [facility],  referral was sent to [facility]. ELISA TAYLOR called [facility], spoke to Alcira. Per Alcira, they did not have this pt.    RN CM called [facility] ([phone]), spoke to Kareen. Per Kareen, they previously tried to place pt at [facility] but was unable to. Kareen also said pt has a rep payee from [facility]. Per Kareen, if pt is receiving SS benefits and not medically complicated, they can try to evaluate pt for housing assistance.     Barriers to Discharge:   Disposition  Ostomy supplies     Plan:   Hospital Care Management will continue to follow and assist with discharge planning needs.

## 2021-04-26 NOTE — CARE PLAN
Problem: Communication  Goal: The ability to communicate needs accurately and effectively will improve  Outcome: PROGRESSING AS EXPECTED  Note: Pt has been educated on importance for appropriately calling for assistance or needs with call light, any questions/concerns answered at this time. Call light close by, will check on patient hourly.       Problem: Safety  Goal: Will remain free from injury  Outcome: PROGRESSING AS EXPECTED  Note: Discussed patient mobility status with interdisciplinary team, fall precautions in place, pt fall prevention education done, pt educated to call for assistance when needed.     Goal: Will remain free from falls  Outcome: PROGRESSING AS EXPECTED     Problem: Venous Thromboembolism (VTW)/Deep Vein Thrombosis (DVT) Prevention:  Goal: Patient will participate in Venous Thrombosis (VTE)/Deep Vein Thrombosis (DVT)Prevention Measures  Outcome: PROGRESSING AS EXPECTED  Note: Pt educated on VTE prevention, pharmacological and physical VTE prophylaxis in place. Pt ambulates and turns in bed.       Problem: Bowel/Gastric:  Goal: Normal bowel function is maintained or improved  Outcome: PROGRESSING AS EXPECTED     Problem: Psychosocial Needs:  Goal: Level of anxiety will decrease  Outcome: PROGRESSING AS EXPECTED     Problem: Mobility  Goal: Risk for activity intolerance will decrease  Outcome: PROGRESSING AS EXPECTED  Note: Discussed plan to mobilize, active range of motion with resistance, educated on benefits of mobilization, risks of activity intolerance.       Problem: Skin Integrity  Goal: Risk for impaired skin integrity will decrease  Outcome: PROGRESSING AS EXPECTED

## 2021-04-27 LAB — GLUCOSE BLD-MCNC: 96 MG/DL (ref 65–99)

## 2021-04-27 PROCEDURE — 700102 HCHG RX REV CODE 250 W/ 637 OVERRIDE(OP): Performed by: STUDENT IN AN ORGANIZED HEALTH CARE EDUCATION/TRAINING PROGRAM

## 2021-04-27 PROCEDURE — 700102 HCHG RX REV CODE 250 W/ 637 OVERRIDE(OP): Performed by: NURSE PRACTITIONER

## 2021-04-27 PROCEDURE — 99232 SBSQ HOSP IP/OBS MODERATE 35: CPT | Performed by: HOSPITALIST

## 2021-04-27 PROCEDURE — A9270 NON-COVERED ITEM OR SERVICE: HCPCS | Performed by: STUDENT IN AN ORGANIZED HEALTH CARE EDUCATION/TRAINING PROGRAM

## 2021-04-27 PROCEDURE — A9270 NON-COVERED ITEM OR SERVICE: HCPCS | Performed by: NURSE PRACTITIONER

## 2021-04-27 PROCEDURE — A9270 NON-COVERED ITEM OR SERVICE: HCPCS | Performed by: HOSPITALIST

## 2021-04-27 PROCEDURE — 700102 HCHG RX REV CODE 250 W/ 637 OVERRIDE(OP): Performed by: HOSPITALIST

## 2021-04-27 PROCEDURE — 82962 GLUCOSE BLOOD TEST: CPT

## 2021-04-27 PROCEDURE — 770001 HCHG ROOM/CARE - MED/SURG/GYN PRIV*

## 2021-04-27 RX ADMIN — ATORVASTATIN CALCIUM 40 MG: 40 TABLET, FILM COATED ORAL at 17:58

## 2021-04-27 RX ADMIN — CLONAZEPAM 0.5 MG: 0.5 TABLET ORAL at 08:02

## 2021-04-27 RX ADMIN — FLUDROCORTISONE ACETATE 0.1 MG: 0.1 TABLET ORAL at 06:20

## 2021-04-27 RX ADMIN — METOPROLOL SUCCINATE 12.5 MG: 25 TABLET, EXTENDED RELEASE ORAL at 06:20

## 2021-04-27 RX ADMIN — APIXABAN 5 MG: 5 TABLET, FILM COATED ORAL at 08:02

## 2021-04-27 RX ADMIN — PAROXETINE HYDROCHLORIDE 20 MG: 20 TABLET, FILM COATED ORAL at 08:02

## 2021-04-27 RX ADMIN — INSULIN GLARGINE 5 UNITS: 100 INJECTION, SOLUTION SUBCUTANEOUS at 06:20

## 2021-04-27 RX ADMIN — DIGOXIN 125 MCG: 125 TABLET ORAL at 17:58

## 2021-04-27 RX ADMIN — CLONAZEPAM 1 MG: 1 TABLET ORAL at 17:58

## 2021-04-27 RX ADMIN — LEVOTHYROXINE SODIUM 112 MCG: 0.11 TABLET ORAL at 06:20

## 2021-04-27 RX ADMIN — ZIPRASIDONE HYDROCHLORIDE 60 MG: 60 CAPSULE ORAL at 20:53

## 2021-04-27 RX ADMIN — POTASSIUM CHLORIDE 20 MEQ: 20 TABLET, EXTENDED RELEASE ORAL at 06:20

## 2021-04-27 RX ADMIN — SIMETHICONE 80 MG: 80 TABLET, CHEWABLE ORAL at 17:59

## 2021-04-27 RX ADMIN — ZIPRASIDONE HYDROCHLORIDE 60 MG: 60 CAPSULE ORAL at 08:02

## 2021-04-27 RX ADMIN — SIMETHICONE 80 MG: 80 TABLET, CHEWABLE ORAL at 13:59

## 2021-04-27 RX ADMIN — SIMETHICONE 80 MG: 80 TABLET, CHEWABLE ORAL at 06:20

## 2021-04-27 RX ADMIN — APIXABAN 5 MG: 5 TABLET, FILM COATED ORAL at 20:53

## 2021-04-27 RX ADMIN — HYDROCORTISONE 10 MG: 10 TABLET ORAL at 06:20

## 2021-04-27 RX ADMIN — HYDROCORTISONE 5 MG: 10 TABLET ORAL at 16:08

## 2021-04-27 ASSESSMENT — ENCOUNTER SYMPTOMS
INSOMNIA: 0
CONSTIPATION: 0
COUGH: 0
ABDOMINAL PAIN: 0
ROS GI COMMENTS: COLOSTOMY IN PLACE
EYE PAIN: 0
DIZZINESS: 0
SPEECH CHANGE: 0
DIARRHEA: 0
NAUSEA: 0
PALPITATIONS: 0
WEAKNESS: 0
SENSORY CHANGE: 0
FOCAL WEAKNESS: 0
NERVOUS/ANXIOUS: 1
SHORTNESS OF BREATH: 0
EYE DISCHARGE: 0
FEVER: 0
SORE THROAT: 0
DEPRESSION: 1
HEADACHES: 0
MYALGIAS: 0
VOMITING: 0

## 2021-04-27 NOTE — PROGRESS NOTES
Attending Hospitalist is Dr Monik Navas starting at 0700. Please contact this physician for orders, updates or questions today. Pt moved from LTP list for physician to monitor today.

## 2021-04-27 NOTE — PROGRESS NOTES
Assumed care of pt at 0700. Report received and bedside rounding completed with night RN. Pt is calm no SOB, no acute distress noted. Flat affect. Denies any pain this am.     Safety Sitter at bedside. 1:1 direct supervision. Stop sign reviewed with sitter. All equipment removed from patients room.   - hourly rounding in place. See flowsheets for further assessment.

## 2021-04-27 NOTE — PROGRESS NOTES
Patient discussed with Psych team who advised I call Dr. Justice to discuss concerns and placing patient on another legal hold for suicide attempt with call light cord. Informed Dr. Justice of recent events and he agreed that patient would need to be re-evaluated and Legal hold was necessary. Legal hold papers filled out and another consult placed to psych team to evaluate. Sitter remains at bedside and patient's room has been stripped of all unnecessary equipment. Given daily needs of this patient related to SI will transition care back to acute care MD.

## 2021-04-27 NOTE — CARE PLAN
Problem: Safety  Goal: Will remain free from injury  Outcome: PROGRESSING AS EXPECTED  Note: Direct supervision from 1:1 sitter at bedside. Room cleared of harmful objects.   Goal: Will remain free from falls  Outcome: PROGRESSING AS EXPECTED     Problem: Discharge Barriers/Planning  Goal: Patient's continuum of care needs will be met  Outcome: PROGRESSING AS EXPECTED

## 2021-04-27 NOTE — PROGRESS NOTES
Hospital Medicine Twice Weekly Progress Note    Date of Service  4/27/2021      Chief Complaint  Suicidal ideation    Hospital Course  Mr. Castro is a 56 year-old man with a past medical history significant for atrial fibrillation, secondary hypercoagulable state, diabetes mellitus complicated by diabetic neuropathy, chronic systolic CHF, colostomy placement, orthostatic hypotension (maintained on florinef), and dyslipidemia who presented to the ED on 2/9/2021 after he became dizzy and had a syncopal episode at home.  In the ED he was noted to be hypotensive and bradycardic. Cardiology was consulted and recommended stopping all AV sanket blocking medications.  He remained orthostatic during his hospitalization so midodrine was started.  An echocardiogram was obtained and showed mild mitral regurgitation and an EF of 60% which was improved from his previous echocardiogram obtained on 11/2020 (previously 35%).  CTAs of the head and neck were done and were unremarkable outside of distal bilateral ICA tortuosity.  Also during his hospitalization he expressed desire to kill himself.  He has a colostomy and has become overwhelmed by caring for it.  He was subsequently placed on a legal hold and psychiatry was consulted.  He is now pending [facility] placement at the 6-month db for maturity of his colostomy.    Interval Problem Update  4/20- Patient's civil commitment was discontinued this morning.  Received message from nursing that patient again stating suicidal thoughts to staff.  On discussion with patient he states he is not suicidal at this time and has no intention of hurting himself, that he is just depressed related to his colostomy and having trouble dealing with his medical issues.  Patient reassured provider that he is not suicidal at this time.  Patient encouraged to focus on positive thoughts.  Nursing contacted psychiatry who state legal hold is not necessary at this time.   4/21- Patient again stating he is  "having suicidal ideations and expressed to nursing that he has a plan to slit his throat. Discussed patient with Psychiatry and agree with her assessment that patient makes statements but his actions are not coinciding with his expressions.  Went to bedside and discussed with patient his comments.  Patient directly stated \" I am having those suicidal ideations again\".  Asked patient point blank if he wanted to live at which he stated \" yes I want to live\".  Talked at length with patient regarding possible thoughts that would enter his head and ability to focus on positive aspects.  At end of conversation patient states he had no intention of committing suicide and will focus on getting well.  Questionable secondary gain as motive for daily expression of suicidal ideation.   4/24- Patient resting in bed, states he continues to have suicidal thoughts because he is upset at having a colostomy, but then states he wants to live and get better. PT  Re-evaluated patient and feel he is at baseline and ok to return to group home. Will discuss with case management on availability to return to former living facility. Discontinued midodrine, blood pressure remaining elevated with lower dose metoprolol.   4/27: Patient was placed back on legal hold.  Legal hold paperwork is filled out and psych facility transfer is pending.    Consultants/Specialty  Cardiology  Psychiatry  General surgery    Code Status  Full Code    Disposition  TBD- Civil commitment discontinued-working on discharge with case management    Review of Systems  Review of Systems   Constitutional: Negative for fever and malaise/fatigue.   HENT: Negative for congestion and sore throat.    Eyes: Negative for pain and discharge.   Respiratory: Negative for cough and shortness of breath.    Cardiovascular: Positive for leg swelling. Negative for chest pain and palpitations.   Gastrointestinal: Negative for abdominal pain, constipation, diarrhea, nausea and vomiting. "        Colostomy in place    Genitourinary: Negative for dysuria, frequency and urgency.   Musculoskeletal: Negative for myalgias.   Neurological: Negative for dizziness, sensory change, speech change, focal weakness, weakness and headaches.   Psychiatric/Behavioral: Positive for depression. Negative for suicidal ideas. The patient is nervous/anxious. The patient does not have insomnia.       Physical Exam  Temp:  [35.7 °C (96.3 °F)-37.1 °C (98.8 °F)] 36.5 °C (97.7 °F)  Pulse:  [] 74  Resp:  [16-20] 20  BP: (110-127)/(67-83) 125/75  SpO2:  [92 %-97 %] 92 %    Physical Exam  Vitals and nursing note reviewed.   Constitutional:       General: He is awake. He is not in acute distress.     Appearance: Normal appearance. He is well-developed. He is not ill-appearing.   HENT:      Head: Normocephalic and atraumatic.      Mouth/Throat:      Lips: Pink.      Mouth: Mucous membranes are moist.   Eyes:      General: Lids are normal.      Conjunctiva/sclera: Conjunctivae normal.      Pupils: Pupils are equal, round, and reactive to light.   Cardiovascular:      Rate and Rhythm: Normal rate. Rhythm irregularly irregular.      Pulses: Normal pulses.      Heart sounds: Normal heart sounds.   Pulmonary:      Effort: Pulmonary effort is normal.      Breath sounds: Normal breath sounds. No decreased breath sounds or wheezing.   Abdominal:      General: Bowel sounds are normal. There is no distension.      Palpations: Abdomen is soft.      Tenderness: There is no abdominal tenderness.       Musculoskeletal:      Cervical back: Neck supple.      Right lower leg: Edema (stable today) present.      Left lower leg: Edema (stable today) present.   Skin:     General: Skin is warm and dry.      Coloration: Skin is pale.   Neurological:      General: No focal deficit present.      Mental Status: He is alert.   Psychiatric:         Attention and Perception: Attention and perception normal.         Mood and Affect: Affect normal. Mood is  depressed.         Speech: Speech normal.         Behavior: Behavior normal. Behavior is cooperative.         Thought Content: Thought content does not include suicidal ideation. Thought content does not include suicidal plan.         Cognition and Memory: Memory normal.      Comments: Patient states depressed secondary to colostomy and further need for it, stated to provider that he has no intention of harming himself and is not suicidal at this time.      Exam unchanged from previous on 4/21    Fluids    Intake/Output Summary (Last 24 hours) at 4/27/2021 1341  Last data filed at 4/27/2021 1000  Gross per 24 hour   Intake --   Output 700 ml   Net -700 ml     Laboratory          Imaging  KD-BSXEGOI-2 VIEW   Final Result         No significant interval change.      DX-CHEST-LIMITED (1 VIEW)   Final Result         Diffuse interstitial prominence could relate to mild pulmonary edema or atypical infection      PS-MKEINWN-2 VIEW   Final Result      Increased colonic gas without definite bowel obstruction.      US-RENAL   Final Result      No evidence of hydronephrosis.      Lobulated kidneys bilaterally.      CT-ABDOMEN-PELVIS WITH   Final Result      1.  There is no evidence of small bowel obstruction.   2.  There is a left lower quadrant ostomy.   3.  There is fluid distention of the colon distal to the surgical material in the left mid descending colon extending all the way to the rectum. There is no pneumatosis or free air.   4.  There is cholelithiasis without biliary dilatation.   5.  There has been interval removal of the drainage catheter anterior to the spleen with minimal hypodense area in the anterior spleen again noted. There is no new fluid collection.      IR-US GUIDED PIV   Final Result    Ultrasound-guided PERIPHERAL IV INSERTION performed by    qualified nursing staff as above.      EC-ECHOCARDIOGRAM COMPLETE W/O CONT   Final Result      DX-LUMBAR SPINE-2 OR 3 VIEWS   Final Result      Moderate  "compression deformity of T11 is new compared to 2018.      Mild wedge deformity of T12 is unchanged.      Degenerative changes including facet arthropathy.      Mild retrolisthesis of L5 on S1 and L3 on L4.            Assessment/Plan  * Suicidal ideation- (present on admission)  Assessment & Plan  -Has history of MDD, recurrent with psychotic features. active SI w/ plan on admission  -Psychiatry following, recommended discontinuation of civil commitment - suspect possible malingering or secondary gain   - Evaluated bedside again and discussed with patient who states he \"wants to live\"   -Psych recommending sitter to ensure patient does not attempt actions that would extend his stay     Orthostatic hypotension- (present on admission)  Assessment & Plan  -Multifactorial including adrenal insufficiency, medications (beta blocker, CCB, tamsulosin), diabetic autonomic dysfunction, and venous insufficiency.  -EF is noted to be 60%, significant improvement from prior (previously 35%).  -Per cardiology, no further cardiac work-up needed.  Recommends follow-up with them as outpatient.  -Fall precautions.  -Continue to educate on the importance of slow positional changes.   - Started PO hydrocortisone 4/7/2021 for AI. Continuing florinef.   -Decreased toprolol which allowed for discontinuation of midodrine  Improved     Paroxysmal atrial fibrillation (HCC)- (present on admission)  Assessment & Plan  -CHADSVASC 3 (HTN, CHF, DM).   -TTE Feb 2021: EF 60%.  -Continue metoprolol, digoxin, and eliquis.    Chronic heart failure with preserved ejection fraction (HCC)- (present on admission)  Assessment & Plan  -Now stable.  -Pulmonary edema noted in mid-March, was placed on IV diuresis though that was held when he developed a gram-negative gina bacteremia and sepsis.  Currently doing well from a respiratory standpoint, no shortness of breath, lungs clear to auscultation bilaterally, not requiring oxygen supplementation.  -Repeat " echocardiogram showed improvement in his ejection fraction to 60%.    -Continue metoprolol succinate.  -Lisinopril held per cardiology recommendations due to hypotension.    Diabetes mellitus type 2, insulin dependent (HCC)- (present on admission)  Assessment & Plan  -A1c 5.2% on 2/14/2021.  -Continue diabetic diet.  -Continue lantus at current dose   -Hypoglycemia protocol in place if needed.      Major depressive disorder, recurrent, severe with psychotic features (HCC)- (present on admission)  Assessment & Plan  -Management as listed under 'anxiety and depression'.    Anxiety and depression- (present on admission)  Assessment & Plan  -Legal hold extended, awaiting acceptance at psych facility ([facility] will take in May).  -Remains expressing suicidal ideations but actions contradict.   -Psychiatry signed off feel he is malingering   -Continue paxil, klonopin, & geodon.     Hypothyroidism- (present on admission)  Assessment & Plan  -TFTs in Feb were WNL. Continue levothyroxine at current dose.     Adrenal insufficiency (HCC)- (present on admission)  Assessment & Plan  -Started PO hydrocortisone 4/7/2021. Continue florinef.   -Needs outpatient endocrinology follow up.  -Monitor potassium while on florinef. 4.4 on 4/7/2021.  -Repeat orthostatics periodically if symptomatic         Obstructive uropathy- (present on admission)  Assessment & Plan  -Trial off of flomax given his hypotension.  Discontinued 4/6/2021. No retention thus far.     Chronic venous insufficiency of lower extremity- (present on admission)  Assessment & Plan  -Continue compression stockings.    Colostomy present (HCC)- (present on admission)  Assessment & Plan  -History of fall at home Nov 2020 on left side with perforated colon and splenic fluid collection/hematoma s/p segmental colectomy, colostomy, mobilization of the splenic flexure, wound vac, and I&D Nov 2020.  -Wound care consulted for ostomy care, but he was non-participatory. He is now  engaging in ostomy care with his bedside RNs, encouraged to be observant but do it independently.  -Patient must be independently able to care for his ostomy before inpatient psychiatry will accept him.  Per surgery Dr. Desouza, ostomy reversal is high risk given patient's complexities. Plan for close multidisciplinary outpatient follow-up for surgery when patients overall medical conditions are more optimized.    Debility- (present on admission)  Assessment & Plan  -Cleared by PT/OT.  -Out of bed for each meal and ambulate in the hallways at least twice per shift.    Mixed hyperlipidemia- (present on admission)  Assessment & Plan  Lab Results   Component Value Date/Time    CHOLSTRLTOT 146 02/14/2021 01:30 AM    LDL 82 02/14/2021 01:30 AM    HDL 32 (A) 02/14/2021 01:30 AM    TRIGLYCERIDE 160 (H) 02/14/2021 01:30 AM   -Continue atorvastatin.     Lower limb amputation, great toe (HCC)- (present on admission)  Assessment & Plan  -History of, 2018.  Site well-healed.    Class 2 severe obesity with serious comorbidity and body mass index (BMI) of 35.0 to 35.9 in adult (HCC)- (present on admission)  Assessment & Plan  Body mass index is 35.19 kg/m².-Trending down.  -Could benefit from outpatient weight loss counseling which can be arranged through his PCP after hospital discharge.       VTE prophylaxis: Eliquis

## 2021-04-27 NOTE — CONSULTS
[facility]    INPATIENT ASSESSMENT    Name: Sebastián Castro  MRN: [id number]  : 1965  Age: 56 y.o.  Date of assessment: 2021  PCP: ELIZABETH Terrazas  Persons in attendance: Patient     Length of Intervention: 30 Minutes    HPI: Sebastián Palmer is a 56 year-old man with a past medical history significant for atrial fibrillation, secondary hypercoagulable state. Diabetes mellitus with glyco of 5.2 complicated with diabetic neuropathy, history of congestive systolic heart failure, dyslipidemia presented to the ER on 2021 after he had a syncopal episode.    CHIEF COMPLAINT/PRESENTING ISSUE (as stated by Patient): Sebastián Palmer is a patient well known to the [facility]al [facility]. Sebastián has been on a legal hold for several weeks during this hospital stay due to suicidal ideations. The suicidal ideations are directly linked to patient's distress related to his colostomy bag. Patient's only desire is to have this colostomy reversed. It has been explained to patient that a reversal typically is not considered until after 6 months from the initial colostomy placement. Patient has not be able to adjust to or accept the colostomy placement and refuses to care for it or learn to function independent with the colostomy bag. Patient continues to appear helpless and unable to learn the care procedures although reportedly when patient resided in a group home prior to this admission, he managed his own colostomy care without incident.   Patient has been receiving ongoing psychotherapy from Dr. Fernandez and medication/symptom management and support from Dr. Berger who discontinued this patient's legal hold on 2021 as patient reportedly was no longer suicidal. The plan was to transfer this patient to a SNF where he can continue to receive care.    Patient reports he experienced a bad dream that he was attacked by someone and they cut off his colostomy bag. Patient states this dream was so upsetting  to him, his suicidal thoughts returned. Patient denies attempting to choke himself teresa the TV remote, but continues to endorse suicidal ideations with a plan to cut his throat.    Chief Complaint   Patient presents with   • Syncope     X1 this morning when standing up to go to bathroom        CURRENT LIVING SITUATION/SOCIAL SUPPORT: Prior to this hospitalization patient resided in a [facility] group home. Patient has a payee who manages his income. Patient has a brother with whom he enjoys a close relationship.    BEHAVIORAL HEALTH TREATMENT HISTORY  Does patient/parent report a history of prior behavioral health treatment for patient?   Patient was followed by Dr. Dill, psychiatrist with [facility]    SAFETY ASSESSMENT - SELF  Does patient acknowledge current or past symptoms of dangerousness to self? yes  Does parent/significant other report patient has current or past symptoms of dangerousness to self? N\A  Does presenting problem suggest symptoms of dangerousness to self? Yes:     Past Current    Suicidal Thoughts: []  []    Suicidal Plans: []  []    Suicidal Intent: []  []    Suicide Attempts: []  []    Self-Injury []  []      For any boxes checked above, provide detail: Attempted to choke self with tv remote cord per medical record.    History of suicide by family member: no  History of suicide by friend/significant other: no  Recent change in frequency/specificity/intensity of suicidal thoughts or self-harm behavior? yes -   Current access to firearms, medications, or other identified means of suicide/self-harm? no  If yes, willing to restrict access to means of suicide/self-harm? no  Protective factors present:  Willing to address in treatment    SAFETY ASSESSMENT - OTHERS  Does patient acknowledge current or past symptoms of aggressive behavior or risk to others? no  Does parent/significant other report patient has current or past symptoms of aggressive behavior or risk to others?  N\A  Does presenting  problem suggest symptoms of dangerousness to others? No    Crisis Safety Plan completed and copy given to patient? no    ABUSE/NEGLECT SCREENING  Does patient report feeling “unsafe” in his/her home, or afraid of anyone?  no  Does patient report any history of physical, sexual, or emotional abuse?  no  Does parent or significant other report any of the above? N\A  Is there evidence of neglect by self?  no  Is there evidence of neglect by a caregiver? no  Does the patient/parent report any history of CPS/APS/police involvement related to suspected abuse/neglect or domestic violence? no  Based on the information provided during the current assessment, is a mandated report of suspected abuse/neglect being made?  No    SUBSTANCE USE SCREENING  Sober from alcohol since 2016 per medical record      MENTAL STATUS              Participation: Active verbal participation  Grooming: Casual  Orientation: Alert  Behavior: Calm  Eye contact: Limited  Mood: Depressed  Affect: Flat  Thought process: Goal-directed  Thought content: Within normal limits  Speech: Soft  Perception: Within normal limits  Memory:  No gross evidence of memory deficits  Insight: Limited  Judgment:  Limited  Other:    Collateral information:   Source:   Significant other present in person:    Significant other by telephone   [facility]   [facility] Medical Record-reviewed   Other: APRN     Unable to complete full assessment due to:   Acute intoxication   Patient declined to participate/engage   Patient verbally unresponsive   Significant cognitive deficits   Significant perceptual distortions or behavioral disorganization   Other:             CLINICAL IMPRESSIONS:  Primary:  Major Depression Recurrent without psychotic features  Secondary: Dependent Personality Disorder                                     IDENTIFIED NEEDS/PLAN:  [Trigger DISPOSITION list for any items marked]     x Imminent safety risk - self  Imminent safety  risk - others     Acute substance withdrawal   Psychosis/Impaired reality testing    x Mood/anxiety   Substance use/Addictive behavior     Maladaptive behavior   Parent/child conflict     Family/Couples conflict   Biomedical     Housing   Financial      Legal  Occupational/Educational     Domestic violence   Other:     Recommendations and Observation Level:  1. Sitter: Yes  2. Phone: yes  3. Visitors: yes  4. Personal belongings: no  5. Please transfer this patient to an inpatient psychiatric facility when a bed becomes available      Legal Hold: Extended      Thank you,    Susan Ewing, Ph.D., LCSW  4/27/2021

## 2021-04-27 NOTE — DISCHARGE PLANNING
Anticipated Discharge Disposition:   Inpatient Psych    Action:   Per chart review, pt is back on Legal Hold, medically cleared for inpatient psych. Paperwork faxed to [facility]. [facility] to send referrals.    Barriers to Discharge:   Inpatient Psych placement acceptance and bed availability.    Plan:   Follow up with [facility].  Hospital Care Management will continue to follow and assist with discharge planning needs.

## 2021-04-28 LAB — GLUCOSE BLD-MCNC: 113 MG/DL (ref 65–99)

## 2021-04-28 PROCEDURE — 700102 HCHG RX REV CODE 250 W/ 637 OVERRIDE(OP): Performed by: NURSE PRACTITIONER

## 2021-04-28 PROCEDURE — 700102 HCHG RX REV CODE 250 W/ 637 OVERRIDE(OP): Performed by: STUDENT IN AN ORGANIZED HEALTH CARE EDUCATION/TRAINING PROGRAM

## 2021-04-28 PROCEDURE — 99232 SBSQ HOSP IP/OBS MODERATE 35: CPT | Performed by: HOSPITALIST

## 2021-04-28 PROCEDURE — A9270 NON-COVERED ITEM OR SERVICE: HCPCS | Performed by: HOSPITALIST

## 2021-04-28 PROCEDURE — A9270 NON-COVERED ITEM OR SERVICE: HCPCS | Performed by: STUDENT IN AN ORGANIZED HEALTH CARE EDUCATION/TRAINING PROGRAM

## 2021-04-28 PROCEDURE — 700102 HCHG RX REV CODE 250 W/ 637 OVERRIDE(OP): Performed by: HOSPITALIST

## 2021-04-28 PROCEDURE — 770001 HCHG ROOM/CARE - MED/SURG/GYN PRIV*

## 2021-04-28 PROCEDURE — 82962 GLUCOSE BLOOD TEST: CPT

## 2021-04-28 PROCEDURE — A9270 NON-COVERED ITEM OR SERVICE: HCPCS | Performed by: NURSE PRACTITIONER

## 2021-04-28 RX ADMIN — APIXABAN 5 MG: 5 TABLET, FILM COATED ORAL at 19:57

## 2021-04-28 RX ADMIN — INSULIN GLARGINE 5 UNITS: 100 INJECTION, SOLUTION SUBCUTANEOUS at 05:55

## 2021-04-28 RX ADMIN — ATORVASTATIN CALCIUM 40 MG: 40 TABLET, FILM COATED ORAL at 17:43

## 2021-04-28 RX ADMIN — DOCUSATE SODIUM 50 MG AND SENNOSIDES 8.6 MG 2 TABLET: 8.6; 5 TABLET, FILM COATED ORAL at 05:51

## 2021-04-28 RX ADMIN — POTASSIUM CHLORIDE 20 MEQ: 20 TABLET, EXTENDED RELEASE ORAL at 05:51

## 2021-04-28 RX ADMIN — SIMETHICONE 80 MG: 80 TABLET, CHEWABLE ORAL at 13:43

## 2021-04-28 RX ADMIN — APIXABAN 5 MG: 5 TABLET, FILM COATED ORAL at 08:27

## 2021-04-28 RX ADMIN — CLONAZEPAM 0.5 MG: 0.5 TABLET ORAL at 08:27

## 2021-04-28 RX ADMIN — LEVOTHYROXINE SODIUM 112 MCG: 0.11 TABLET ORAL at 05:51

## 2021-04-28 RX ADMIN — PAROXETINE HYDROCHLORIDE 20 MG: 20 TABLET, FILM COATED ORAL at 08:27

## 2021-04-28 RX ADMIN — FLUDROCORTISONE ACETATE 0.1 MG: 0.1 TABLET ORAL at 05:51

## 2021-04-28 RX ADMIN — HYDROCORTISONE 5 MG: 10 TABLET ORAL at 16:03

## 2021-04-28 RX ADMIN — HYDROCORTISONE 10 MG: 10 TABLET ORAL at 05:52

## 2021-04-28 RX ADMIN — ZIPRASIDONE HYDROCHLORIDE 60 MG: 60 CAPSULE ORAL at 08:27

## 2021-04-28 RX ADMIN — CLONAZEPAM 1 MG: 1 TABLET ORAL at 17:44

## 2021-04-28 RX ADMIN — ZIPRASIDONE HYDROCHLORIDE 60 MG: 60 CAPSULE ORAL at 19:57

## 2021-04-28 RX ADMIN — DIGOXIN 125 MCG: 125 TABLET ORAL at 17:43

## 2021-04-28 RX ADMIN — SIMETHICONE 80 MG: 80 TABLET, CHEWABLE ORAL at 19:57

## 2021-04-28 ASSESSMENT — ENCOUNTER SYMPTOMS
DIARRHEA: 0
MYALGIAS: 0
EYE DISCHARGE: 0
VOMITING: 0
DIZZINESS: 0
SHORTNESS OF BREATH: 0
EYE PAIN: 0
INSOMNIA: 0
DEPRESSION: 1
ROS GI COMMENTS: COLOSTOMY IN PLACE
WEAKNESS: 0
ABDOMINAL PAIN: 0
CONSTIPATION: 0
SENSORY CHANGE: 0
HEADACHES: 0
SORE THROAT: 0
SPEECH CHANGE: 0
NAUSEA: 0
FOCAL WEAKNESS: 0
PALPITATIONS: 0
COUGH: 0
FEVER: 0
NERVOUS/ANXIOUS: 1

## 2021-04-28 NOTE — CARE PLAN
Problem: Communication  Goal: The ability to communicate needs accurately and effectively will improve  Outcome: PROGRESSING AS EXPECTED     Problem: Safety  Goal: Will remain free from injury  Outcome: PROGRESSING AS EXPECTED  Note: Sitter at bedside  Goal: Will remain free from falls  Outcome: PROGRESSING AS EXPECTED     Problem: Bowel/Gastric:  Goal: Normal bowel function is maintained or improved  Outcome: PROGRESSING AS EXPECTED  Note: Colostomy in place

## 2021-04-28 NOTE — PROGRESS NOTES
Hospital Medicine Twice Weekly Progress Note    Date of Service  4/28/2021      Chief Complaint  Suicidal ideation    Hospital Course  Mr. Castro is a 56 year-old man with a past medical history significant for atrial fibrillation, secondary hypercoagulable state, diabetes mellitus complicated by diabetic neuropathy, chronic systolic CHF, colostomy placement, orthostatic hypotension (maintained on florinef), and dyslipidemia who presented to the ED on 2/9/2021 after he became dizzy and had a syncopal episode at home.  In the ED he was noted to be hypotensive and bradycardic. Cardiology was consulted and recommended stopping all AV sanket blocking medications.  He remained orthostatic during his hospitalization so midodrine was started.  An echocardiogram was obtained and showed mild mitral regurgitation and an EF of 60% which was improved from his previous echocardiogram obtained on 11/2020 (previously 35%).  CTAs of the head and neck were done and were unremarkable outside of distal bilateral ICA tortuosity.  Also during his hospitalization he expressed desire to kill himself.  He has a colostomy and has become overwhelmed by caring for it.  He was subsequently placed on a legal hold and psychiatry was consulted.  He is now pending [facility] placement at the 6-month db for maturity of his colostomy.    Interval Problem Update  4/20- Patient's civil commitment was discontinued this morning.  Received message from nursing that patient again stating suicidal thoughts to staff.  On discussion with patient he states he is not suicidal at this time and has no intention of hurting himself, that he is just depressed related to his colostomy and having trouble dealing with his medical issues.  Patient reassured provider that he is not suicidal at this time.  Patient encouraged to focus on positive thoughts.  Nursing contacted psychiatry who state legal hold is not necessary at this time.   4/21- Patient again stating he is  "having suicidal ideations and expressed to nursing that he has a plan to slit his throat. Discussed patient with Psychiatry and agree with her assessment that patient makes statements but his actions are not coinciding with his expressions.  Went to bedside and discussed with patient his comments.  Patient directly stated \" I am having those suicidal ideations again\".  Asked patient point blank if he wanted to live at which he stated \" yes I want to live\".  Talked at length with patient regarding possible thoughts that would enter his head and ability to focus on positive aspects.  At end of conversation patient states he had no intention of committing suicide and will focus on getting well.  Questionable secondary gain as motive for daily expression of suicidal ideation.   4/24- Patient resting in bed, states he continues to have suicidal thoughts because he is upset at having a colostomy, but then states he wants to live and get better. PT  Re-evaluated patient and feel he is at baseline and ok to return to group home. Will discuss with case management on availability to return to former living facility. Discontinued midodrine, blood pressure remaining elevated with lower dose metoprolol.   4/27: Patient was placed back on legal hold.  Legal hold paperwork is filled out and psych facility transfer is pending.  4/28: Psych facility transfer is pending.  I will discuss with surgery whether to reverse his colostomy bag is a possibility as inpatient.  Consultants/Specialty  Cardiology  Psychiatry  General surgery    Code Status  Full Code    Disposition  TBD- Civil commitment discontinued-working on discharge with case management    Review of Systems  Review of Systems   Constitutional: Negative for fever and malaise/fatigue.   HENT: Negative for congestion and sore throat.    Eyes: Negative for pain and discharge.   Respiratory: Negative for cough and shortness of breath.    Cardiovascular: Positive for leg swelling. " Negative for chest pain and palpitations.   Gastrointestinal: Negative for abdominal pain, constipation, diarrhea, nausea and vomiting.        Colostomy in place    Genitourinary: Negative for dysuria, frequency and urgency.   Musculoskeletal: Negative for myalgias.   Neurological: Negative for dizziness, sensory change, speech change, focal weakness, weakness and headaches.   Psychiatric/Behavioral: Positive for depression. Negative for suicidal ideas. The patient is nervous/anxious. The patient does not have insomnia.       Physical Exam  Temp:  [36.1 °C (96.9 °F)-36.2 °C (97.2 °F)] 36.2 °C (97.2 °F)  Pulse:  [63-65] 63  Resp:  [16-18] 18  BP: (117-122)/(72-78) 117/78  SpO2:  [95 %-97 %] 96 %    Physical Exam  Vitals and nursing note reviewed.   Constitutional:       General: He is awake. He is not in acute distress.     Appearance: Normal appearance. He is well-developed. He is not ill-appearing.   HENT:      Head: Normocephalic and atraumatic.      Mouth/Throat:      Lips: Pink.      Mouth: Mucous membranes are moist.   Eyes:      General: Lids are normal.      Conjunctiva/sclera: Conjunctivae normal.      Pupils: Pupils are equal, round, and reactive to light.   Cardiovascular:      Rate and Rhythm: Normal rate. Rhythm irregularly irregular.      Pulses: Normal pulses.      Heart sounds: Normal heart sounds.   Pulmonary:      Effort: Pulmonary effort is normal.      Breath sounds: Normal breath sounds. No decreased breath sounds or wheezing.   Abdominal:      General: Bowel sounds are normal. There is no distension.      Palpations: Abdomen is soft.      Tenderness: There is no abdominal tenderness.       Musculoskeletal:      Cervical back: Neck supple.      Right lower leg: Edema (stable today) present.      Left lower leg: Edema (stable today) present.   Skin:     General: Skin is warm and dry.      Coloration: Skin is pale.   Neurological:      General: No focal deficit present.      Mental Status: He is  alert.   Psychiatric:         Attention and Perception: Attention and perception normal.         Mood and Affect: Affect normal. Mood is depressed.         Speech: Speech normal.         Behavior: Behavior normal. Behavior is cooperative.         Thought Content: Thought content does not include suicidal ideation. Thought content does not include suicidal plan.         Cognition and Memory: Memory normal.      Comments: Patient states depressed secondary to colostomy and further need for it, stated to provider that he has no intention of harming himself and is not suicidal at this time.      Exam unchanged from previous on 4/21    Fluids    Intake/Output Summary (Last 24 hours) at 4/28/2021 1320  Last data filed at 4/28/2021 1000  Gross per 24 hour   Intake 240 ml   Output 100 ml   Net 140 ml     Laboratory          Imaging  IQ-ZDIELAT-5 VIEW   Final Result         No significant interval change.      DX-CHEST-LIMITED (1 VIEW)   Final Result         Diffuse interstitial prominence could relate to mild pulmonary edema or atypical infection      WC-FWOHZCM-3 VIEW   Final Result      Increased colonic gas without definite bowel obstruction.      US-RENAL   Final Result      No evidence of hydronephrosis.      Lobulated kidneys bilaterally.      CT-ABDOMEN-PELVIS WITH   Final Result      1.  There is no evidence of small bowel obstruction.   2.  There is a left lower quadrant ostomy.   3.  There is fluid distention of the colon distal to the surgical material in the left mid descending colon extending all the way to the rectum. There is no pneumatosis or free air.   4.  There is cholelithiasis without biliary dilatation.   5.  There has been interval removal of the drainage catheter anterior to the spleen with minimal hypodense area in the anterior spleen again noted. There is no new fluid collection.      IR-US GUIDED PIV   Final Result    Ultrasound-guided PERIPHERAL IV INSERTION performed by    qualified nursing  "staff as above.      EC-ECHOCARDIOGRAM COMPLETE W/O CONT   Final Result      DX-LUMBAR SPINE-2 OR 3 VIEWS   Final Result      Moderate compression deformity of T11 is new compared to 2018.      Mild wedge deformity of T12 is unchanged.      Degenerative changes including facet arthropathy.      Mild retrolisthesis of L5 on S1 and L3 on L4.            Assessment/Plan  * Suicidal ideation- (present on admission)  Assessment & Plan  -Has history of MDD, recurrent with psychotic features. active SI w/ plan on admission  -Psychiatry following, recommended discontinuation of civil commitment - suspect possible malingering or secondary gain   - Evaluated bedside again and discussed with patient who states he \"wants to live\"   -Psych recommending sitter to ensure patient does not attempt actions that would extend his stay     Orthostatic hypotension- (present on admission)  Assessment & Plan  -Multifactorial including adrenal insufficiency, medications (beta blocker, CCB, tamsulosin), diabetic autonomic dysfunction, and venous insufficiency.  -EF is noted to be 60%, significant improvement from prior (previously 35%).  -Per cardiology, no further cardiac work-up needed.  Recommends follow-up with them as outpatient.  -Fall precautions.  -Continue to educate on the importance of slow positional changes.   - Started PO hydrocortisone 4/7/2021 for AI. Continuing florinef.   -Decreased toprolol which allowed for discontinuation of midodrine  Improved     Paroxysmal atrial fibrillation (HCC)- (present on admission)  Assessment & Plan  -CHADSVASC 3 (HTN, CHF, DM).   -TTE Feb 2021: EF 60%.  -Continue metoprolol, digoxin, and eliquis.    Chronic heart failure with preserved ejection fraction (HCC)- (present on admission)  Assessment & Plan  -Now stable.  -Pulmonary edema noted in mid-March, was placed on IV diuresis though that was held when he developed a gram-negative gina bacteremia and sepsis.  Currently doing well from a " respiratory standpoint, no shortness of breath, lungs clear to auscultation bilaterally, not requiring oxygen supplementation.  -Repeat echocardiogram showed improvement in his ejection fraction to 60%.    -Continue metoprolol succinate.  -Lisinopril held per cardiology recommendations due to hypotension.    Diabetes mellitus type 2, insulin dependent (HCC)- (present on admission)  Assessment & Plan  -A1c 5.2% on 2/14/2021.  -Continue diabetic diet.  -Continue lantus at current dose   -Hypoglycemia protocol in place if needed.      Major depressive disorder, recurrent, severe with psychotic features (HCC)- (present on admission)  Assessment & Plan  -Management as listed under 'anxiety and depression'.    Anxiety and depression- (present on admission)  Assessment & Plan  -Legal hold extended, awaiting acceptance at psych facility ([facility] will take in May).  -Remains expressing suicidal ideations but actions contradict.   -Psychiatry signed off feel he is malingering   -Continue paxil, klonopin, & geodon.     Hypothyroidism- (present on admission)  Assessment & Plan  -TFTs in Feb were WNL. Continue levothyroxine at current dose.     Adrenal insufficiency (HCC)- (present on admission)  Assessment & Plan  -Started PO hydrocortisone 4/7/2021. Continue florinef.   -Needs outpatient endocrinology follow up.  -Monitor potassium while on florinef. 4.4 on 4/7/2021.  -Repeat orthostatics periodically if symptomatic         Obstructive uropathy- (present on admission)  Assessment & Plan  -Trial off of flomax given his hypotension.  Discontinued 4/6/2021. No retention thus far.     Chronic venous insufficiency of lower extremity- (present on admission)  Assessment & Plan  -Continue compression stockings.    Colostomy present (HCC)- (present on admission)  Assessment & Plan  -History of fall at home Nov 2020 on left side with perforated colon and splenic fluid collection/hematoma s/p segmental colectomy, colostomy, mobilization of  the splenic flexure, wound vac, and I&D Nov 2020.  -Wound care consulted for ostomy care, but he was non-participatory. He is now engaging in ostomy care with his bedside RNs, encouraged to be observant but do it independently.  -Patient must be independently able to care for his ostomy before inpatient psychiatry will accept him.  Per surgery Dr. Desouza, ostomy reversal is high risk given patient's complexities. Plan for close multidisciplinary outpatient follow-up for surgery when patients overall medical conditions are more optimized.    Debility- (present on admission)  Assessment & Plan  -Cleared by PT/OT.  -Out of bed for each meal and ambulate in the hallways at least twice per shift.    Mixed hyperlipidemia- (present on admission)  Assessment & Plan  Lab Results   Component Value Date/Time    CHOLSTRLTOT 146 02/14/2021 01:30 AM    LDL 82 02/14/2021 01:30 AM    HDL 32 (A) 02/14/2021 01:30 AM    TRIGLYCERIDE 160 (H) 02/14/2021 01:30 AM   -Continue atorvastatin.     Lower limb amputation, great toe (HCC)- (present on admission)  Assessment & Plan  -History of, 2018.  Site well-healed.    Class 2 severe obesity with serious comorbidity and body mass index (BMI) of 35.0 to 35.9 in adult (HCC)- (present on admission)  Assessment & Plan  Body mass index is 35.19 kg/m².-Trending down.  -Could benefit from outpatient weight loss counseling which can be arranged through his PCP after hospital discharge.       VTE prophylaxis: Eliquis

## 2021-04-28 NOTE — PROGRESS NOTES
Assumed pt care at 0700. Received report from Carolyn PÉREZ. Pt denies pain at this time. Sitter at bedside.   Updated on POC, communication board updated. Hourly rounding in place. Bed locked and in lowest position. Non-skid socks in place. Needs met, will continue to monitor.

## 2021-04-28 NOTE — PROGRESS NOTES
Pt resting one on one sitter at bedside through out shift. Pt states he has no thoughts of harming himself at this time. Legal hold remains In place. STOP sign outside the room.

## 2021-04-28 NOTE — DISCHARGE PLANNING
Filed petition to the court via [employer]. Waiting on verified petition.    Received verified petition from the court. Scanned copy of legal hold extension into pt's chart, sent copy to ELISA Sutton.

## 2021-04-29 LAB — GLUCOSE BLD-MCNC: 106 MG/DL (ref 65–99)

## 2021-04-29 PROCEDURE — A9270 NON-COVERED ITEM OR SERVICE: HCPCS | Performed by: STUDENT IN AN ORGANIZED HEALTH CARE EDUCATION/TRAINING PROGRAM

## 2021-04-29 PROCEDURE — 700102 HCHG RX REV CODE 250 W/ 637 OVERRIDE(OP): Performed by: HOSPITALIST

## 2021-04-29 PROCEDURE — 99231 SBSQ HOSP IP/OBS SF/LOW 25: CPT | Performed by: HOSPITALIST

## 2021-04-29 PROCEDURE — 700102 HCHG RX REV CODE 250 W/ 637 OVERRIDE(OP): Performed by: STUDENT IN AN ORGANIZED HEALTH CARE EDUCATION/TRAINING PROGRAM

## 2021-04-29 PROCEDURE — 82962 GLUCOSE BLOOD TEST: CPT

## 2021-04-29 PROCEDURE — A9270 NON-COVERED ITEM OR SERVICE: HCPCS | Performed by: NURSE PRACTITIONER

## 2021-04-29 PROCEDURE — 700102 HCHG RX REV CODE 250 W/ 637 OVERRIDE(OP): Performed by: NURSE PRACTITIONER

## 2021-04-29 PROCEDURE — 770001 HCHG ROOM/CARE - MED/SURG/GYN PRIV*

## 2021-04-29 PROCEDURE — A9270 NON-COVERED ITEM OR SERVICE: HCPCS | Performed by: HOSPITALIST

## 2021-04-29 RX ADMIN — PAROXETINE HYDROCHLORIDE 20 MG: 20 TABLET, FILM COATED ORAL at 08:03

## 2021-04-29 RX ADMIN — CLONAZEPAM 0.5 MG: 0.5 TABLET ORAL at 08:03

## 2021-04-29 RX ADMIN — ZIPRASIDONE HYDROCHLORIDE 60 MG: 60 CAPSULE ORAL at 20:04

## 2021-04-29 RX ADMIN — SIMETHICONE 80 MG: 80 TABLET, CHEWABLE ORAL at 11:03

## 2021-04-29 RX ADMIN — SIMETHICONE 80 MG: 80 TABLET, CHEWABLE ORAL at 17:12

## 2021-04-29 RX ADMIN — SIMETHICONE 80 MG: 80 TABLET, CHEWABLE ORAL at 05:57

## 2021-04-29 RX ADMIN — APIXABAN 5 MG: 5 TABLET, FILM COATED ORAL at 20:04

## 2021-04-29 RX ADMIN — APIXABAN 5 MG: 5 TABLET, FILM COATED ORAL at 08:03

## 2021-04-29 RX ADMIN — CLONAZEPAM 1 MG: 1 TABLET ORAL at 17:12

## 2021-04-29 RX ADMIN — HYDROCORTISONE 10 MG: 10 TABLET ORAL at 05:57

## 2021-04-29 RX ADMIN — METOPROLOL SUCCINATE 12.5 MG: 25 TABLET, EXTENDED RELEASE ORAL at 05:58

## 2021-04-29 RX ADMIN — FLUDROCORTISONE ACETATE 0.1 MG: 0.1 TABLET ORAL at 05:57

## 2021-04-29 RX ADMIN — ATORVASTATIN CALCIUM 40 MG: 40 TABLET, FILM COATED ORAL at 17:12

## 2021-04-29 RX ADMIN — DIGOXIN 125 MCG: 125 TABLET ORAL at 17:12

## 2021-04-29 RX ADMIN — HYDROCORTISONE 5 MG: 10 TABLET ORAL at 15:35

## 2021-04-29 RX ADMIN — POTASSIUM CHLORIDE 20 MEQ: 20 TABLET, EXTENDED RELEASE ORAL at 05:56

## 2021-04-29 RX ADMIN — ZIPRASIDONE HYDROCHLORIDE 60 MG: 60 CAPSULE ORAL at 11:03

## 2021-04-29 RX ADMIN — LEVOTHYROXINE SODIUM 112 MCG: 0.11 TABLET ORAL at 05:57

## 2021-04-29 RX ADMIN — INSULIN GLARGINE 5 UNITS: 100 INJECTION, SOLUTION SUBCUTANEOUS at 06:00

## 2021-04-29 ASSESSMENT — ENCOUNTER SYMPTOMS
SORE THROAT: 0
SENSORY CHANGE: 0
VOMITING: 0
WEAKNESS: 0
DEPRESSION: 1
EYE PAIN: 0
SPEECH CHANGE: 0
ROS GI COMMENTS: COLOSTOMY IN PLACE
FEVER: 0
EYE DISCHARGE: 0
ABDOMINAL PAIN: 0
COUGH: 0
PALPITATIONS: 0
CONSTIPATION: 0
MYALGIAS: 0
SHORTNESS OF BREATH: 0
NAUSEA: 0
DIZZINESS: 0
HEADACHES: 0
DIARRHEA: 0
NERVOUS/ANXIOUS: 1
INSOMNIA: 0
FOCAL WEAKNESS: 0

## 2021-04-29 NOTE — PROGRESS NOTES
Spiritual Care Note        Patient's Name: Sebastián Castro   MRN: [id number]    YOB: 1965   Age and Gender: 56 y.o. male   Service Area/Unit: [address]   Room (and Bed): S148   Ethnicity or Nationality: white   Primary Language: English   Church/Spiritual Preference: [employer]   Place of Residence: [place], [place]   Medical Diagnoses/Procedures: suicidal ideation, active, w/ plan on admission  orthostatic hypotension  paroxysmal atrial fibrillation   chronic heart failure w/ preserved ejection fraction   diabetes mellitus type 2, insulin dependent   major depressive disorder, recurrent, severe w/ psychotic features   anxiety and depression  hypothyroidism  adrenal insufficiency  obstructive uropathy  chronic venous insufficiency of lower extremity  colostomy present   mixed hyperlipidemia  h/o  lower limb amputation, great toe, 2018  class 2 severe obesity with serious comorbidity   Code Status: Full Code    Date of Admission: 2/9/2021   Length of Stay: 78 days        Visited With:   Patient    Nature of the Visit:   On shift, Follow-up    General Visit:   Yes    Referral From/Origin of Request:   Epic nursing    Observations/Symptoms:   Patient sitting up in bed, alert, pleasant.    Interaction/Conversation:   Patient asked for prayer.    Assessment:   Need, Distress    Need:   Seeking Spiritual Assistance and Support    Distress:   Anxiety about the Future, Overwhelmed    Religous Needs:   Prayer    Interventions:   compassionate presence, reflective listening, prayer    Outcomes:   Connectedness with the Holy/with God, Spiritual Comfort, Progress with Trust    Total Time:   10 minutes    Plan:   Continue to follow      Spiritual Care Provider:  Chaplain COOPER Julian  [phone]   [email or web address]

## 2021-04-29 NOTE — CARE PLAN
Problem: Knowledge Deficit  Goal: Knowledge of the prescribed therapeutic regimen will improve  Outcome: PROGRESSING AS EXPECTED  Intervention: Discuss information regarding therpeutic regimen and document in education  Note: POC  and medications explained to pt. All questions answered.      Problem: Skin Integrity  Goal: Risk for impaired skin integrity will decrease  4/29/2021 1618 by Valarie CROSS R.N.  Intervention: Implement precautions to protect skin integrity in collaboration with the interdisciplinary team  Note: Pt with Michael of 17, waffle cushion on, barrier paste to sacrum. Pt able to turn self and encouraged to do so

## 2021-04-29 NOTE — PROGRESS NOTES
Hospital Medicine Twice Weekly Progress Note    Date of Service  4/29/2021      Chief Complaint  Suicidal ideation    Hospital Course  Mr. Castro is a 56 year-old man with a past medical history significant for atrial fibrillation, secondary hypercoagulable state, diabetes mellitus complicated by diabetic neuropathy, chronic systolic CHF, colostomy placement, orthostatic hypotension (maintained on florinef), and dyslipidemia who presented to the ED on 2/9/2021 after he became dizzy and had a syncopal episode at home.  In the ED he was noted to be hypotensive and bradycardic. Cardiology was consulted and recommended stopping all AV sanket blocking medications.  He remained orthostatic during his hospitalization so midodrine was started.  An echocardiogram was obtained and showed mild mitral regurgitation and an EF of 60% which was improved from his previous echocardiogram obtained on 11/2020 (previously 35%).  CTAs of the head and neck were done and were unremarkable outside of distal bilateral ICA tortuosity.  Also during his hospitalization he expressed desire to kill himself.  He has a colostomy and has become overwhelmed by caring for it.  He was subsequently placed on a legal hold and psychiatry was consulted.  He is now pending [facility] placement at the 6-month db for maturity of his colostomy.    Interval Problem Update  4/20- Patient's civil commitment was discontinued this morning.  Received message from nursing that patient again stating suicidal thoughts to staff.  On discussion with patient he states he is not suicidal at this time and has no intention of hurting himself, that he is just depressed related to his colostomy and having trouble dealing with his medical issues.  Patient reassured provider that he is not suicidal at this time.  Patient encouraged to focus on positive thoughts.  Nursing contacted psychiatry who state legal hold is not necessary at this time.   4/21- Patient again stating he is  "having suicidal ideations and expressed to nursing that he has a plan to slit his throat. Discussed patient with Psychiatry and agree with her assessment that patient makes statements but his actions are not coinciding with his expressions.  Went to bedside and discussed with patient his comments.  Patient directly stated \" I am having those suicidal ideations again\".  Asked patient point blank if he wanted to live at which he stated \" yes I want to live\".  Talked at length with patient regarding possible thoughts that would enter his head and ability to focus on positive aspects.  At end of conversation patient states he had no intention of committing suicide and will focus on getting well.  Questionable secondary gain as motive for daily expression of suicidal ideation.   4/24- Patient resting in bed, states he continues to have suicidal thoughts because he is upset at having a colostomy, but then states he wants to live and get better. PT  Re-evaluated patient and feel he is at baseline and ok to return to group home. Will discuss with case management on availability to return to former living facility. Discontinued midodrine, blood pressure remaining elevated with lower dose metoprolol.   4/27: Patient was placed back on legal hold.  Legal hold paperwork is filled out and psych facility transfer is pending.  4/28: Psych facility transfer is pending.  I will discuss with surgery whether to reverse his colostomy bag is a possibility as inpatient.  4/29: No acute issues per patient today.  We will continue to evaluate for psych facility.  Consultants/Specialty  Cardiology  Psychiatry  General surgery    Code Status  Full Code    Disposition  TBD- Civil commitment discontinued-working on discharge with case management    Review of Systems  Review of Systems   Constitutional: Negative for fever and malaise/fatigue.   HENT: Negative for congestion and sore throat.    Eyes: Negative for pain and discharge. "   Respiratory: Negative for cough and shortness of breath.    Cardiovascular: Positive for leg swelling. Negative for chest pain and palpitations.   Gastrointestinal: Negative for abdominal pain, constipation, diarrhea, nausea and vomiting.        Colostomy in place    Genitourinary: Negative for dysuria, frequency and urgency.   Musculoskeletal: Negative for myalgias.   Neurological: Negative for dizziness, sensory change, speech change, focal weakness, weakness and headaches.   Psychiatric/Behavioral: Positive for depression. Negative for suicidal ideas. The patient is nervous/anxious. The patient does not have insomnia.       Physical Exam  Temp:  [36.1 °C (97 °F)-36.6 °C (97.8 °F)] 36.1 °C (97 °F)  Pulse:  [67-91] 67  Resp:  [16-19] 18  BP: (140-151)/(75-83) 143/75  SpO2:  [95 %-98 %] 97 %    Physical Exam  Vitals and nursing note reviewed.   Constitutional:       General: He is awake. He is not in acute distress.     Appearance: Normal appearance. He is well-developed. He is not ill-appearing.   HENT:      Head: Normocephalic and atraumatic.      Mouth/Throat:      Lips: Pink.      Mouth: Mucous membranes are moist.   Eyes:      General: Lids are normal.      Conjunctiva/sclera: Conjunctivae normal.      Pupils: Pupils are equal, round, and reactive to light.   Cardiovascular:      Rate and Rhythm: Normal rate. Rhythm irregularly irregular.      Pulses: Normal pulses.      Heart sounds: Normal heart sounds.   Pulmonary:      Effort: Pulmonary effort is normal.      Breath sounds: Normal breath sounds. No decreased breath sounds or wheezing.   Abdominal:      General: Bowel sounds are normal. There is no distension.      Palpations: Abdomen is soft.      Tenderness: There is no abdominal tenderness.       Musculoskeletal:      Cervical back: Neck supple.      Right lower leg: Edema (stable today) present.      Left lower leg: Edema (stable today) present.   Skin:     General: Skin is warm and dry.       Coloration: Skin is pale.   Neurological:      General: No focal deficit present.      Mental Status: He is alert.   Psychiatric:         Attention and Perception: Attention and perception normal.         Mood and Affect: Affect normal. Mood is depressed.         Speech: Speech normal.         Behavior: Behavior normal. Behavior is cooperative.         Thought Content: Thought content does not include suicidal ideation. Thought content does not include suicidal plan.         Cognition and Memory: Memory normal.      Comments: Patient states depressed secondary to colostomy and further need for it, stated to provider that he has no intention of harming himself and is not suicidal at this time.      Exam unchanged from previous on 4/21    Fluids    Intake/Output Summary (Last 24 hours) at 4/29/2021 1339  Last data filed at 4/29/2021 0800  Gross per 24 hour   Intake 580 ml   Output 800 ml   Net -220 ml     Laboratory          Imaging  [id number] VIEW   Final Result         No significant interval change.      DX-CHEST-LIMITED (1 VIEW)   Final Result         Diffuse interstitial prominence could relate to mild pulmonary edema or atypical infection      [id number] VIEW   Final Result      Increased colonic gas without definite bowel obstruction.      US-RENAL   Final Result      No evidence of hydronephrosis.      Lobulated kidneys bilaterally.      CT-ABDOMEN-PELVIS WITH   Final Result      1.  There is no evidence of small bowel obstruction.   2.  There is a left lower quadrant ostomy.   3.  There is fluid distention of the colon distal to the surgical material in the left mid descending colon extending all the way to the rectum. There is no pneumatosis or free air.   4.  There is cholelithiasis without biliary dilatation.   5.  There has been interval removal of the drainage catheter anterior to the spleen with minimal hypodense area in the anterior spleen again noted. There is no new fluid collection.      IR-US  "GUIDED PIV   Final Result    Ultrasound-guided PERIPHERAL IV INSERTION performed by    qualified nursing staff as above.      EC-ECHOCARDIOGRAM COMPLETE W/O CONT   Final Result      DX-LUMBAR SPINE-2 OR 3 VIEWS   Final Result      Moderate compression deformity of T11 is new compared to 2018.      Mild wedge deformity of T12 is unchanged.      Degenerative changes including facet arthropathy.      Mild retrolisthesis of L5 on S1 and L3 on L4.            Assessment/Plan  * Suicidal ideation- (present on admission)  Assessment & Plan  -Has history of MDD, recurrent with psychotic features. active SI w/ plan on admission  -Psychiatry following, recommended discontinuation of civil commitment - suspect possible malingering or secondary gain   - Evaluated bedside again and discussed with patient who states he \"wants to live\"   -Psych recommending sitter to ensure patient does not attempt actions that would extend his stay     Orthostatic hypotension- (present on admission)  Assessment & Plan  -Multifactorial including adrenal insufficiency, medications (beta blocker, CCB, tamsulosin), diabetic autonomic dysfunction, and venous insufficiency.  -EF is noted to be 60%, significant improvement from prior (previously 35%).  -Per cardiology, no further cardiac work-up needed.  Recommends follow-up with them as outpatient.  -Fall precautions.  -Continue to educate on the importance of slow positional changes.   - Started PO hydrocortisone 4/7/2021 for AI. Continuing florinef.   -Decreased toprolol which allowed for discontinuation of midodrine  Improved     Paroxysmal atrial fibrillation (HCC)- (present on admission)  Assessment & Plan  -CHADSVASC 3 (HTN, CHF, DM).   -TTE Feb 2021: EF 60%.  -Continue metoprolol, digoxin, and eliquis.    Chronic heart failure with preserved ejection fraction (HCC)- (present on admission)  Assessment & Plan  -Now stable.  -Pulmonary edema noted in mid-March, was placed on IV diuresis though that " was held when he developed a gram-negative gina bacteremia and sepsis.  Currently doing well from a respiratory standpoint, no shortness of breath, lungs clear to auscultation bilaterally, not requiring oxygen supplementation.  -Repeat echocardiogram showed improvement in his ejection fraction to 60%.    -Continue metoprolol succinate.  -Lisinopril held per cardiology recommendations due to hypotension.    Diabetes mellitus type 2, insulin dependent (HCC)- (present on admission)  Assessment & Plan  -A1c 5.2% on 2/14/2021.  -Continue diabetic diet.  -Continue lantus at current dose   -Hypoglycemia protocol in place if needed.      Major depressive disorder, recurrent, severe with psychotic features (HCC)- (present on admission)  Assessment & Plan  -Management as listed under 'anxiety and depression'.    Anxiety and depression- (present on admission)  Assessment & Plan  -Legal hold extended, awaiting acceptance at psych facility ([facility] will take in May).  -Remains expressing suicidal ideations but actions contradict.   -Psychiatry signed off feel he is malingering   -Continue paxil, klonopin, & geodon.     Hypothyroidism- (present on admission)  Assessment & Plan  -TFTs in Feb were WNL. Continue levothyroxine at current dose.     Adrenal insufficiency (HCC)- (present on admission)  Assessment & Plan  -Started PO hydrocortisone 4/7/2021. Continue florinef.   -Needs outpatient endocrinology follow up.  -Monitor potassium while on florinef. 4.4 on 4/7/2021.  -Repeat orthostatics periodically if symptomatic         Obstructive uropathy- (present on admission)  Assessment & Plan  -Trial off of flomax given his hypotension.  Discontinued 4/6/2021. No retention thus far.     Chronic venous insufficiency of lower extremity- (present on admission)  Assessment & Plan  -Continue compression stockings.    Colostomy present (HCC)- (present on admission)  Assessment & Plan  -History of fall at home Nov 2020 on left side with  perforated colon and splenic fluid collection/hematoma s/p segmental colectomy, colostomy, mobilization of the splenic flexure, wound vac, and I&D Nov 2020.  -Wound care consulted for ostomy care, but he was non-participatory. He is now engaging in ostomy care with his bedside RNs, encouraged to be observant but do it independently.  -Patient must be independently able to care for his ostomy before inpatient psychiatry will accept him.  Per surgery Dr. Desouza, ostomy reversal is high risk given patient's complexities. Plan for close multidisciplinary outpatient follow-up for surgery when patients overall medical conditions are more optimized.    Debility- (present on admission)  Assessment & Plan  -Cleared by PT/OT.  -Out of bed for each meal and ambulate in the hallways at least twice per shift.    Mixed hyperlipidemia- (present on admission)  Assessment & Plan  Lab Results   Component Value Date/Time    CHOLSTRLTOT 146 02/14/2021 01:30 AM    LDL 82 02/14/2021 01:30 AM    HDL 32 (A) 02/14/2021 01:30 AM    TRIGLYCERIDE 160 (H) 02/14/2021 01:30 AM   -Continue atorvastatin.     Lower limb amputation, great toe (HCC)- (present on admission)  Assessment & Plan  -History of, 2018.  Site well-healed.    Class 2 severe obesity with serious comorbidity and body mass index (BMI) of 35.0 to 35.9 in adult (HCC)- (present on admission)  Assessment & Plan  Body mass index is 35.19 kg/m².-Trending down.  -Could benefit from outpatient weight loss counseling which can be arranged through his PCP after hospital discharge.       VTE prophylaxis: Eliquis

## 2021-04-29 NOTE — PROGRESS NOTES
Assumed care of pt at shift change. Pt is on RA with no signs of acute distress. A&Ox4. Denies any pain at the moment. Medications administred. 1:1 sitter Maeve by bedside. Pt denies any thought of harming self. All comfort measures in place. Hourly rounding in place.

## 2021-04-29 NOTE — PROGRESS NOTES
Pt a&o x4. On RA. Denies pain. Mid abdomen with dressing CDI. Left colostomy with soft brown stool. Reports generalized weakness. Denies any thoughts of harming self or others. Sitter outside of room in view of patient. Able to make needs known.

## 2021-04-30 PROCEDURE — A9270 NON-COVERED ITEM OR SERVICE: HCPCS | Performed by: STUDENT IN AN ORGANIZED HEALTH CARE EDUCATION/TRAINING PROGRAM

## 2021-04-30 PROCEDURE — 700102 HCHG RX REV CODE 250 W/ 637 OVERRIDE(OP): Performed by: STUDENT IN AN ORGANIZED HEALTH CARE EDUCATION/TRAINING PROGRAM

## 2021-04-30 PROCEDURE — 700102 HCHG RX REV CODE 250 W/ 637 OVERRIDE(OP): Performed by: NURSE PRACTITIONER

## 2021-04-30 PROCEDURE — 700102 HCHG RX REV CODE 250 W/ 637 OVERRIDE(OP): Performed by: HOSPITALIST

## 2021-04-30 PROCEDURE — 99231 SBSQ HOSP IP/OBS SF/LOW 25: CPT | Performed by: HOSPITALIST

## 2021-04-30 PROCEDURE — A9270 NON-COVERED ITEM OR SERVICE: HCPCS | Performed by: NURSE PRACTITIONER

## 2021-04-30 PROCEDURE — A9270 NON-COVERED ITEM OR SERVICE: HCPCS | Performed by: HOSPITALIST

## 2021-04-30 PROCEDURE — 770001 HCHG ROOM/CARE - MED/SURG/GYN PRIV*

## 2021-04-30 PROCEDURE — 90832 PSYTX W PT 30 MINUTES: CPT | Performed by: PSYCHOLOGIST

## 2021-04-30 RX ADMIN — INSULIN GLARGINE 5 UNITS: 100 INJECTION, SOLUTION SUBCUTANEOUS at 05:00

## 2021-04-30 RX ADMIN — SIMETHICONE 80 MG: 80 TABLET, CHEWABLE ORAL at 05:00

## 2021-04-30 RX ADMIN — HYDROCORTISONE 10 MG: 10 TABLET ORAL at 05:00

## 2021-04-30 RX ADMIN — APIXABAN 5 MG: 5 TABLET, FILM COATED ORAL at 08:02

## 2021-04-30 RX ADMIN — FLUDROCORTISONE ACETATE 0.1 MG: 0.1 TABLET ORAL at 05:00

## 2021-04-30 RX ADMIN — APIXABAN 5 MG: 5 TABLET, FILM COATED ORAL at 20:20

## 2021-04-30 RX ADMIN — SIMETHICONE 80 MG: 80 TABLET, CHEWABLE ORAL at 17:42

## 2021-04-30 RX ADMIN — DIGOXIN 125 MCG: 125 TABLET ORAL at 17:42

## 2021-04-30 RX ADMIN — ZIPRASIDONE HYDROCHLORIDE 60 MG: 60 CAPSULE ORAL at 08:03

## 2021-04-30 RX ADMIN — ZIPRASIDONE HYDROCHLORIDE 60 MG: 60 CAPSULE ORAL at 20:20

## 2021-04-30 RX ADMIN — METOPROLOL SUCCINATE 12.5 MG: 25 TABLET, EXTENDED RELEASE ORAL at 04:59

## 2021-04-30 RX ADMIN — POTASSIUM CHLORIDE 20 MEQ: 20 TABLET, EXTENDED RELEASE ORAL at 04:59

## 2021-04-30 RX ADMIN — CLONAZEPAM 1 MG: 1 TABLET ORAL at 17:42

## 2021-04-30 RX ADMIN — ATORVASTATIN CALCIUM 40 MG: 40 TABLET, FILM COATED ORAL at 17:42

## 2021-04-30 RX ADMIN — PAROXETINE HYDROCHLORIDE 20 MG: 20 TABLET, FILM COATED ORAL at 08:03

## 2021-04-30 RX ADMIN — LEVOTHYROXINE SODIUM 112 MCG: 0.11 TABLET ORAL at 04:59

## 2021-04-30 RX ADMIN — SIMETHICONE 80 MG: 80 TABLET, CHEWABLE ORAL at 12:25

## 2021-04-30 RX ADMIN — CLONAZEPAM 0.5 MG: 0.5 TABLET ORAL at 08:02

## 2021-04-30 RX ADMIN — HYDROCORTISONE 5 MG: 10 TABLET ORAL at 15:21

## 2021-04-30 ASSESSMENT — ENCOUNTER SYMPTOMS
EYE PAIN: 0
DIARRHEA: 0
PALPITATIONS: 0
FEVER: 0
DIZZINESS: 0
NAUSEA: 0
ROS GI COMMENTS: COLOSTOMY IN PLACE
SENSORY CHANGE: 0
HEADACHES: 0
SPEECH CHANGE: 0
WEAKNESS: 0
INSOMNIA: 0
NERVOUS/ANXIOUS: 1
DEPRESSION: 1
CONSTIPATION: 0
ABDOMINAL PAIN: 0
MYALGIAS: 0
VOMITING: 0
COUGH: 0
EYE DISCHARGE: 0
SORE THROAT: 0
SHORTNESS OF BREATH: 0
FOCAL WEAKNESS: 0

## 2021-04-30 ASSESSMENT — PAIN DESCRIPTION - PAIN TYPE: TYPE: ACUTE PAIN

## 2021-04-30 NOTE — PROGRESS NOTES
Patient alert and oriented, vss, denies pain. Patient with 1:1 sitter at bedside for SI. Patient denies SI at this time. Patient awaiting placement. Will monitor.

## 2021-04-30 NOTE — CONSULTS
"PSYCHOLOGICAL FOLLOW-UP:  Reason for admission: Syncope and collapse [R55]  Suicidal ideation [R45.851]  Sepsis due to Klebsiella pneumoniae (HCC) [A41.4]  Length of Visit: 20min    Legal status: Legal Status: Involuntary    Chief Complaint: \"I dont feel suicidal today.\"    HPI: Met with the patient for brief individual psychotherapy. Patient presented with a bright affect and reported a \"little depressed\" mood. He denied current and active suicidal thoughts, plans, and intent.  Per chart review, he denied SI this morning to his nurse as well as on 4/28 and 4/27. However, on 4/25 he was noted to have attempted to wrap the call light cord around his neck by the monitor room and reported to his nurse that he did it because he hates his colostomy. Today (as well as on 4/27 to Dr. Ewing), he denied attempting suicide and stated that he was simply \"playing\" with his call light cord. The patient stated his desire to live today and go to a skilled nursing facility. Explained to the patient the concerns this writer has with the differences between his statements regarding the call light and those made in his chart. The patient stated his understanding. Agreed to continue to assess the patient over the next 24 to 48 hours to determine need for inpatient psychiatric care.     Psychiatric Examination:  Vitals: Blood pressure 112/71, pulse 69, temperature 36.2 °C (97.1 °F), temperature source Temporal, resp. rate 17, height 1.829 m (6'), weight 116 kg (256 lb 13.4 oz), SpO2 91 %.  Musculoskeletal: normal psychomotor activity, no tics or unusual mannerisms noted  Appearance and Eye Contact: appropriate dress and grooming. Behavior is calm, cooperative,  appropriate eye contact  Attention/Alertness: Alert  Thought Process: Linear, Logical and Goal Directed    Thought Content: No psychotic processes noted  Speech: Clear with normal rate and rhythm  Mood: \"little depressed\"            Affect: brighter         SI/HI: " Denies    Memory: Recent and remote memory appear intact    Orientation: alert, oriented to person, place and time  Insight into symptoms: poor  Judgement into symptoms:poor    ASSESSMENT: While the patient denies attempting suicide and has denied suicidal ideations over the past 3 days, his story greatly differs from that documented in his chart. He also has a recent history of being a poor historian. Given the seriousness of the actions documented in the chart by nursing, will continue to evaluate the patient's risk over the next 24 to 48 hours and will consider discontinuing the legal hold again if he remains future oriented. Legal hold remains extended.     DSM5 Diagnostic Considerations:   Major Depressive Disorder, recurrent, severe, with psychotic features    PLAN:  Legal status: Involuntary   1:1 sitter remains  No call light  No visitors  No personal items except for personal bible  Anticipate F/U within 48 hours.   Records reviewed: yes  Will continue to follow  Thank you for the consult.    Madhuri Fernandez, Ph.D.

## 2021-04-30 NOTE — PROGRESS NOTES
Hospital Medicine Twice Weekly Progress Note    Date of Service  4/30/2021      Chief Complaint  Suicidal ideation    Hospital Course  Mr. Castro is a 56 year-old man with a past medical history significant for atrial fibrillation, secondary hypercoagulable state, diabetes mellitus complicated by diabetic neuropathy, chronic systolic CHF, colostomy placement, orthostatic hypotension (maintained on florinef), and dyslipidemia who presented to the ED on 2/9/2021 after he became dizzy and had a syncopal episode at home.  In the ED he was noted to be hypotensive and bradycardic. Cardiology was consulted and recommended stopping all AV sanket blocking medications.  He remained orthostatic during his hospitalization so midodrine was started.  An echocardiogram was obtained and showed mild mitral regurgitation and an EF of 60% which was improved from his previous echocardiogram obtained on 11/2020 (previously 35%).  CTAs of the head and neck were done and were unremarkable outside of distal bilateral ICA tortuosity.  Also during his hospitalization he expressed desire to kill himself.  He has a colostomy and has become overwhelmed by caring for it.  He was subsequently placed on a legal hold and psychiatry was consulted.  He is now pending [facility] placement at the 6-month db for maturity of his colostomy.    Interval Problem Update  4/20- Patient's civil commitment was discontinued this morning.  Received message from nursing that patient again stating suicidal thoughts to staff.  On discussion with patient he states he is not suicidal at this time and has no intention of hurting himself, that he is just depressed related to his colostomy and having trouble dealing with his medical issues.  Patient reassured provider that he is not suicidal at this time.  Patient encouraged to focus on positive thoughts.  Nursing contacted psychiatry who state legal hold is not necessary at this time.   4/21- Patient again stating he is  "having suicidal ideations and expressed to nursing that he has a plan to slit his throat. Discussed patient with Psychiatry and agree with her assessment that patient makes statements but his actions are not coinciding with his expressions.  Went to bedside and discussed with patient his comments.  Patient directly stated \" I am having those suicidal ideations again\".  Asked patient point blank if he wanted to live at which he stated \" yes I want to live\".  Talked at length with patient regarding possible thoughts that would enter his head and ability to focus on positive aspects.  At end of conversation patient states he had no intention of committing suicide and will focus on getting well.  Questionable secondary gain as motive for daily expression of suicidal ideation.   4/24- Patient resting in bed, states he continues to have suicidal thoughts because he is upset at having a colostomy, but then states he wants to live and get better. PT  Re-evaluated patient and feel he is at baseline and ok to return to group home. Will discuss with case management on availability to return to former living facility. Discontinued midodrine, blood pressure remaining elevated with lower dose metoprolol.   4/27: Patient was placed back on legal hold.  Legal hold paperwork is filled out and psych facility transfer is pending.  4/28: Psych facility transfer is pending.  I will discuss with surgery whether to reverse his colostomy bag is a possibility as inpatient.  4/29: No acute issues per patient today.  We will continue to evaluate for psych facility.  4/30 seconds extended legal hold.  Continue to find psych facility for placement.  This may take a while since he has a colostomy bag and the facility will take him after 6 months since has been placed.  Consultants/Specialty  Cardiology  Psychiatry  General surgery    Code Status  Full Code    Disposition  TBD- Civil commitment discontinued-working on discharge with case " management    Review of Systems  Review of Systems   Constitutional: Negative for fever and malaise/fatigue.   HENT: Negative for congestion and sore throat.    Eyes: Negative for pain and discharge.   Respiratory: Negative for cough and shortness of breath.    Cardiovascular: Positive for leg swelling. Negative for chest pain and palpitations.   Gastrointestinal: Negative for abdominal pain, constipation, diarrhea, nausea and vomiting.        Colostomy in place    Genitourinary: Negative for dysuria, frequency and urgency.   Musculoskeletal: Negative for myalgias.   Neurological: Negative for dizziness, sensory change, speech change, focal weakness, weakness and headaches.   Psychiatric/Behavioral: Positive for depression. Negative for suicidal ideas. The patient is nervous/anxious. The patient does not have insomnia.       Physical Exam  Temp:  [36.1 °C (96.9 °F)-36.6 °C (97.8 °F)] 36.2 °C (97.1 °F)  Pulse:  [68-73] 69  Resp:  [16-18] 17  BP: (112-154)/(69-98) 112/71  SpO2:  [91 %-97 %] 91 %    Physical Exam  Vitals and nursing note reviewed.   Constitutional:       General: He is awake. He is not in acute distress.     Appearance: Normal appearance. He is well-developed. He is not ill-appearing.   HENT:      Head: Normocephalic and atraumatic.      Mouth/Throat:      Lips: Pink.      Mouth: Mucous membranes are moist.   Eyes:      General: Lids are normal.      Conjunctiva/sclera: Conjunctivae normal.      Pupils: Pupils are equal, round, and reactive to light.   Cardiovascular:      Rate and Rhythm: Normal rate. Rhythm irregularly irregular.      Pulses: Normal pulses.      Heart sounds: Normal heart sounds.   Pulmonary:      Effort: Pulmonary effort is normal.      Breath sounds: Normal breath sounds. No decreased breath sounds or wheezing.   Abdominal:      General: Bowel sounds are normal. There is no distension.      Palpations: Abdomen is soft.      Tenderness: There is no abdominal tenderness.        Musculoskeletal:      Cervical back: Neck supple.      Right lower leg: Edema (stable today) present.      Left lower leg: Edema (stable today) present.   Skin:     General: Skin is warm and dry.      Coloration: Skin is pale.   Neurological:      General: No focal deficit present.      Mental Status: He is alert.   Psychiatric:         Attention and Perception: Attention and perception normal.         Mood and Affect: Affect normal. Mood is depressed.         Speech: Speech normal.         Behavior: Behavior normal. Behavior is cooperative.         Thought Content: Thought content does not include suicidal ideation. Thought content does not include suicidal plan.         Cognition and Memory: Memory normal.      Comments: Patient states depressed secondary to colostomy and further need for it, stated to provider that he has no intention of harming himself and is not suicidal at this time.      Exam unchanged from previous on 4/21    Fluids    Intake/Output Summary (Last 24 hours) at 4/30/2021 1253  Last data filed at 4/30/2021 1000  Gross per 24 hour   Intake 480 ml   Output 1000 ml   Net -520 ml     Laboratory          Imaging  [id number] VIEW   Final Result         No significant interval change.      DX-CHEST-LIMITED (1 VIEW)   Final Result         Diffuse interstitial prominence could relate to mild pulmonary edema or atypical infection      [id number] VIEW   Final Result      Increased colonic gas without definite bowel obstruction.      US-RENAL   Final Result      No evidence of hydronephrosis.      Lobulated kidneys bilaterally.      CT-ABDOMEN-PELVIS WITH   Final Result      1.  There is no evidence of small bowel obstruction.   2.  There is a left lower quadrant ostomy.   3.  There is fluid distention of the colon distal to the surgical material in the left mid descending colon extending all the way to the rectum. There is no pneumatosis or free air.   4.  There is cholelithiasis without biliary  "dilatation.   5.  There has been interval removal of the drainage catheter anterior to the spleen with minimal hypodense area in the anterior spleen again noted. There is no new fluid collection.      IR-US GUIDED PIV   Final Result    Ultrasound-guided PERIPHERAL IV INSERTION performed by    qualified nursing staff as above.      EC-ECHOCARDIOGRAM COMPLETE W/O CONT   Final Result      DX-LUMBAR SPINE-2 OR 3 VIEWS   Final Result      Moderate compression deformity of T11 is new compared to 2018.      Mild wedge deformity of T12 is unchanged.      Degenerative changes including facet arthropathy.      Mild retrolisthesis of L5 on S1 and L3 on L4.            Assessment/Plan  * Suicidal ideation- (present on admission)  Assessment & Plan  -Has history of MDD, recurrent with psychotic features. active SI w/ plan on admission  -Psychiatry following, recommended discontinuation of civil commitment - suspect possible malingering or secondary gain   - Evaluated bedside again and discussed with patient who states he \"wants to live\"   -Psych recommending sitter to ensure patient does not attempt actions that would extend his stay     Orthostatic hypotension- (present on admission)  Assessment & Plan  -Multifactorial including adrenal insufficiency, medications (beta blocker, CCB, tamsulosin), diabetic autonomic dysfunction, and venous insufficiency.  -EF is noted to be 60%, significant improvement from prior (previously 35%).  -Per cardiology, no further cardiac work-up needed.  Recommends follow-up with them as outpatient.  -Fall precautions.  -Continue to educate on the importance of slow positional changes.   - Started PO hydrocortisone 4/7/2021 for AI. Continuing florinef.   -Decreased toprolol which allowed for discontinuation of midodrine  Improved     Paroxysmal atrial fibrillation (HCC)- (present on admission)  Assessment & Plan  -CHADSVASC 3 (HTN, CHF, DM).   -TTE Feb 2021: EF 60%.  -Continue metoprolol, digoxin, and " eliquis.    Chronic heart failure with preserved ejection fraction (HCC)- (present on admission)  Assessment & Plan  -Now stable.  -Pulmonary edema noted in mid-March, was placed on IV diuresis though that was held when he developed a gram-negative gina bacteremia and sepsis.  Currently doing well from a respiratory standpoint, no shortness of breath, lungs clear to auscultation bilaterally, not requiring oxygen supplementation.  -Repeat echocardiogram showed improvement in his ejection fraction to 60%.    -Continue metoprolol succinate.  -Lisinopril held per cardiology recommendations due to hypotension.    Diabetes mellitus type 2, insulin dependent ([facility])- (present on admission)  Assessment & Plan  -A1c 5.2% on 2/14/2021.  -Continue diabetic diet.  -Continue lantus at current dose   -Hypoglycemia protocol in place if needed.      Major depressive disorder, recurrent, severe with psychotic features ([facility])- (present on admission)  Assessment & Plan  -Management as listed under 'anxiety and depression'.    Anxiety and depression- (present on admission)  Assessment & Plan  -Legal hold extended, awaiting acceptance at psych facility ([facility] will take in May).  -Remains expressing suicidal ideations but actions contradict.   -Psychiatry signed off feel he is malingering   -Continue paxil, klonopin, & geodon.     Hypothyroidism- (present on admission)  Assessment & Plan  -TFTs in Feb were WNL. Continue levothyroxine at current dose.     Adrenal insufficiency (HCC)- (present on admission)  Assessment & Plan  -Started PO hydrocortisone 4/7/2021. Continue florinef.   -Needs outpatient endocrinology follow up.  -Monitor potassium while on florinef. 4.4 on 4/7/2021.  -Repeat orthostatics periodically if symptomatic         Obstructive uropathy- (present on admission)  Assessment & Plan  -Trial off of flomax given his hypotension.  Discontinued 4/6/2021. No retention thus far.     Chronic venous insufficiency of lower extremity-  (present on admission)  Assessment & Plan  -Continue compression stockings.    Colostomy present (HCC)- (present on admission)  Assessment & Plan  -History of fall at home Nov 2020 on left side with perforated colon and splenic fluid collection/hematoma s/p segmental colectomy, colostomy, mobilization of the splenic flexure, wound vac, and I&D Nov 2020.  -Wound care consulted for ostomy care, but he was non-participatory. He is now engaging in ostomy care with his bedside RNs, encouraged to be observant but do it independently.  -Patient must be independently able to care for his ostomy before inpatient psychiatry will accept him.  Per surgery Dr. Desouza, ostomy reversal is high risk given patient's complexities. Plan for close multidisciplinary outpatient follow-up for surgery when patients overall medical conditions are more optimized.    Debility- (present on admission)  Assessment & Plan  -Cleared by PT/OT.  -Out of bed for each meal and ambulate in the hallways at least twice per shift.    Mixed hyperlipidemia- (present on admission)  Assessment & Plan  Lab Results   Component Value Date/Time    CHOLSTRLTOT 146 02/14/2021 01:30 AM    LDL 82 02/14/2021 01:30 AM    HDL 32 (A) 02/14/2021 01:30 AM    TRIGLYCERIDE 160 (H) 02/14/2021 01:30 AM   -Continue atorvastatin.     Lower limb amputation, great toe (HCC)- (present on admission)  Assessment & Plan  -History of, 2018.  Site well-healed.    Class 2 severe obesity with serious comorbidity and body mass index (BMI) of 35.0 to 35.9 in adult (HCC)- (present on admission)  Assessment & Plan  Body mass index is 35.19 kg/m².-Trending down.  -Could benefit from outpatient weight loss counseling which can be arranged through his PCP after hospital discharge.       VTE prophylaxis: Eliquis

## 2021-04-30 NOTE — DISCHARGE PLANNING
Anticipated Discharge Disposition:   Inpatient psych    Action:   Discussed discharge planning needs during rounds. Per chart review, pt has a colostomy. Per MD, pt is medically cleared for discharge to inpatient psych facility, pending acceptance. Per Psych note today, Legal Hold remains extended.    DPA to follow up with inpatient psych facilities for acceptance.    Barriers to Discharge:   Inpatient Psych placement acceptance and bed availability.    Plan:   Follow up with DPA.  Hospital Care Management will continue to follow and assist with discharge planning needs.

## 2021-04-30 NOTE — CARE PLAN
Problem: Communication  Goal: The ability to communicate needs accurately and effectively will improve  Outcome: PROGRESSING AS EXPECTED     Problem: Safety  Goal: Will remain free from injury  Outcome: PROGRESSING AS EXPECTED  Goal: Will remain free from falls  Outcome: PROGRESSING AS EXPECTED     Problem: Venous Thromboembolism (VTW)/Deep Vein Thrombosis (DVT) Prevention:  Goal: Patient will participate in Venous Thrombosis (VTE)/Deep Vein Thrombosis (DVT)Prevention Measures  Outcome: PROGRESSING AS EXPECTED  Flowsheets (Taken 4/30/2021 1236)  Risk Assessment Score: 1  VTE RISK: Moderate  Mechanical Prophylaxis: SCDs, Sequential Compression Device  RADHA Hose (Graduated Compression Stockings): Refused  SCDs, Sequential Compression Device: Off  Pharmacologic Prophylaxis Used: Apixaban     Problem: Bowel/Gastric:  Goal: Normal bowel function is maintained or improved  Outcome: PROGRESSING AS EXPECTED  Flowsheets  Taken 4/30/2021 1236 by Ashley Pressley, RALEXANDRO  Last BM: (colostomy) 04/30/21  Taken 4/26/2021 0900 by Raleigh Borges, C.N.A.  Number of Times Stooled: 1  Goal: Will not experience complications related to bowel motility  Outcome: PROGRESSING AS EXPECTED

## 2021-05-01 ENCOUNTER — PATIENT OUTREACH (OUTPATIENT)
Dept: HEALTH INFORMATION MANAGEMENT | Facility: OTHER | Age: 56
End: 2021-05-01

## 2021-05-01 PROCEDURE — A9270 NON-COVERED ITEM OR SERVICE: HCPCS | Performed by: STUDENT IN AN ORGANIZED HEALTH CARE EDUCATION/TRAINING PROGRAM

## 2021-05-01 PROCEDURE — A9270 NON-COVERED ITEM OR SERVICE: HCPCS | Performed by: HOSPITALIST

## 2021-05-01 PROCEDURE — 700102 HCHG RX REV CODE 250 W/ 637 OVERRIDE(OP): Performed by: NURSE PRACTITIONER

## 2021-05-01 PROCEDURE — 99231 SBSQ HOSP IP/OBS SF/LOW 25: CPT | Performed by: HOSPITALIST

## 2021-05-01 PROCEDURE — 700102 HCHG RX REV CODE 250 W/ 637 OVERRIDE(OP): Performed by: STUDENT IN AN ORGANIZED HEALTH CARE EDUCATION/TRAINING PROGRAM

## 2021-05-01 PROCEDURE — A9270 NON-COVERED ITEM OR SERVICE: HCPCS | Performed by: NURSE PRACTITIONER

## 2021-05-01 PROCEDURE — 770001 HCHG ROOM/CARE - MED/SURG/GYN PRIV*

## 2021-05-01 PROCEDURE — 700102 HCHG RX REV CODE 250 W/ 637 OVERRIDE(OP): Performed by: HOSPITALIST

## 2021-05-01 RX ADMIN — SIMETHICONE 80 MG: 80 TABLET, CHEWABLE ORAL at 05:04

## 2021-05-01 RX ADMIN — PAROXETINE HYDROCHLORIDE 20 MG: 20 TABLET, FILM COATED ORAL at 08:28

## 2021-05-01 RX ADMIN — DIGOXIN 125 MCG: 125 TABLET ORAL at 16:53

## 2021-05-01 RX ADMIN — LEVOTHYROXINE SODIUM 112 MCG: 0.11 TABLET ORAL at 05:04

## 2021-05-01 RX ADMIN — CLONAZEPAM 1 MG: 1 TABLET ORAL at 16:53

## 2021-05-01 RX ADMIN — CLONAZEPAM 0.5 MG: 0.5 TABLET ORAL at 08:28

## 2021-05-01 RX ADMIN — ZIPRASIDONE HYDROCHLORIDE 60 MG: 60 CAPSULE ORAL at 08:28

## 2021-05-01 RX ADMIN — ATORVASTATIN CALCIUM 40 MG: 40 TABLET, FILM COATED ORAL at 16:53

## 2021-05-01 RX ADMIN — HYDROCORTISONE 5 MG: 10 TABLET ORAL at 16:53

## 2021-05-01 RX ADMIN — ZIPRASIDONE HYDROCHLORIDE 60 MG: 60 CAPSULE ORAL at 20:06

## 2021-05-01 RX ADMIN — APIXABAN 5 MG: 5 TABLET, FILM COATED ORAL at 20:06

## 2021-05-01 RX ADMIN — FLUDROCORTISONE ACETATE 0.1 MG: 0.1 TABLET ORAL at 05:04

## 2021-05-01 RX ADMIN — SIMETHICONE 80 MG: 80 TABLET, CHEWABLE ORAL at 11:46

## 2021-05-01 RX ADMIN — SIMETHICONE 80 MG: 80 TABLET, CHEWABLE ORAL at 16:53

## 2021-05-01 RX ADMIN — HYDROCORTISONE 10 MG: 10 TABLET ORAL at 05:04

## 2021-05-01 RX ADMIN — APIXABAN 5 MG: 5 TABLET, FILM COATED ORAL at 08:28

## 2021-05-01 RX ADMIN — POTASSIUM CHLORIDE 20 MEQ: 20 TABLET, EXTENDED RELEASE ORAL at 05:04

## 2021-05-01 RX ADMIN — METOPROLOL SUCCINATE 12.5 MG: 25 TABLET, EXTENDED RELEASE ORAL at 05:04

## 2021-05-01 ASSESSMENT — ENCOUNTER SYMPTOMS
COUGH: 0
SENSORY CHANGE: 0
ROS GI COMMENTS: COLOSTOMY IN PLACE
PALPITATIONS: 0
MYALGIAS: 0
CONSTIPATION: 0
EYE DISCHARGE: 0
ABDOMINAL PAIN: 0
SHORTNESS OF BREATH: 0
HEADACHES: 0
SPEECH CHANGE: 0
VOMITING: 0
DEPRESSION: 1
DIZZINESS: 0
DIARRHEA: 0
NERVOUS/ANXIOUS: 1
FOCAL WEAKNESS: 0
FEVER: 0
WEAKNESS: 0
SORE THROAT: 0
NAUSEA: 0
INSOMNIA: 0
EYE PAIN: 0

## 2021-05-01 ASSESSMENT — PAIN DESCRIPTION - PAIN TYPE: TYPE: ACUTE PAIN

## 2021-05-01 NOTE — PROGRESS NOTES
Hospital Medicine Twice Weekly Progress Note    Date of Service  5/1/2021      Chief Complaint  Suicidal ideation    Hospital Course  Mr. Castro is a 56 year-old man with a past medical history significant for atrial fibrillation, secondary hypercoagulable state, diabetes mellitus complicated by diabetic neuropathy, chronic systolic CHF, colostomy placement, orthostatic hypotension (maintained on florinef), and dyslipidemia who presented to the ED on 2/9/2021 after he became dizzy and had a syncopal episode at home.  In the ED he was noted to be hypotensive and bradycardic. Cardiology was consulted and recommended stopping all AV sanket blocking medications.  He remained orthostatic during his hospitalization so midodrine was started.  An echocardiogram was obtained and showed mild mitral regurgitation and an EF of 60% which was improved from his previous echocardiogram obtained on 11/2020 (previously 35%).  CTAs of the head and neck were done and were unremarkable outside of distal bilateral ICA tortuosity.  Also during his hospitalization he expressed desire to kill himself.  He has a colostomy and has become overwhelmed by caring for it.  He was subsequently placed on a legal hold and psychiatry was consulted.  He is now pending [facility] placement at the 6-month db for maturity of his colostomy.    Interval Problem Update  4/20- Patient's civil commitment was discontinued this morning.  Received message from nursing that patient again stating suicidal thoughts to staff.  On discussion with patient he states he is not suicidal at this time and has no intention of hurting himself, that he is just depressed related to his colostomy and having trouble dealing with his medical issues.  Patient reassured provider that he is not suicidal at this time.  Patient encouraged to focus on positive thoughts.  Nursing contacted psychiatry who state legal hold is not necessary at this time.   4/21- Patient again stating he is  "having suicidal ideations and expressed to nursing that he has a plan to slit his throat. Discussed patient with Psychiatry and agree with her assessment that patient makes statements but his actions are not coinciding with his expressions.  Went to bedside and discussed with patient his comments.  Patient directly stated \" I am having those suicidal ideations again\".  Asked patient point blank if he wanted to live at which he stated \" yes I want to live\".  Talked at length with patient regarding possible thoughts that would enter his head and ability to focus on positive aspects.  At end of conversation patient states he had no intention of committing suicide and will focus on getting well.  Questionable secondary gain as motive for daily expression of suicidal ideation.   4/24- Patient resting in bed, states he continues to have suicidal thoughts because he is upset at having a colostomy, but then states he wants to live and get better. PT  Re-evaluated patient and feel he is at baseline and ok to return to group home. Will discuss with case management on availability to return to former living facility. Discontinued midodrine, blood pressure remaining elevated with lower dose metoprolol.   4/27: Patient was placed back on legal hold.  Legal hold paperwork is filled out and psych facility transfer is pending.  4/28: Psych facility transfer is pending.  I will discuss with surgery whether to reverse his colostomy bag is a possibility as inpatient.  4/29: No acute issues per patient today.  We will continue to evaluate for psych facility.  4/30 seconds extended legal hold.  Continue to find psych facility for placement.  This may take a while since he has a colostomy bag and the facility will take him after 6 months since has been placed.  5/1: No acute issues today  Consultants/Specialty  Cardiology  Psychiatry  General surgery    Code Status  Full Code    Disposition  TBD- Civil commitment discontinued-working on " discharge with case management    Review of Systems  Review of Systems   Constitutional: Negative for fever and malaise/fatigue.   HENT: Negative for congestion and sore throat.    Eyes: Negative for pain and discharge.   Respiratory: Negative for cough and shortness of breath.    Cardiovascular: Positive for leg swelling. Negative for chest pain and palpitations.   Gastrointestinal: Negative for abdominal pain, constipation, diarrhea, nausea and vomiting.        Colostomy in place    Genitourinary: Negative for dysuria, frequency and urgency.   Musculoskeletal: Negative for myalgias.   Neurological: Negative for dizziness, sensory change, speech change, focal weakness, weakness and headaches.   Psychiatric/Behavioral: Positive for depression. Negative for suicidal ideas. The patient is nervous/anxious. The patient does not have insomnia.       Physical Exam  Temp:  [36.2 °C (97.2 °F)-36.9 °C (98.4 °F)] 36.2 °C (97.2 °F)  Pulse:  [63-79] 77  Resp:  [17-18] 17  BP: (104-140)/(60-86) 104/60  SpO2:  [93 %] 93 %    Physical Exam  Vitals and nursing note reviewed.   Constitutional:       General: He is awake. He is not in acute distress.     Appearance: Normal appearance. He is well-developed. He is not ill-appearing.   HENT:      Head: Normocephalic and atraumatic.      Mouth/Throat:      Lips: Pink.      Mouth: Mucous membranes are moist.   Eyes:      General: Lids are normal.      Conjunctiva/sclera: Conjunctivae normal.      Pupils: Pupils are equal, round, and reactive to light.   Cardiovascular:      Rate and Rhythm: Normal rate. Rhythm irregularly irregular.      Pulses: Normal pulses.      Heart sounds: Normal heart sounds.   Pulmonary:      Effort: Pulmonary effort is normal.      Breath sounds: Normal breath sounds. No decreased breath sounds or wheezing.   Abdominal:      General: Bowel sounds are normal. There is no distension.      Palpations: Abdomen is soft.      Tenderness: There is no abdominal  tenderness.       Musculoskeletal:      Cervical back: Neck supple.      Right lower leg: Edema (stable today) present.      Left lower leg: Edema (stable today) present.   Skin:     General: Skin is warm and dry.      Coloration: Skin is pale.   Neurological:      General: No focal deficit present.      Mental Status: He is alert.   Psychiatric:         Attention and Perception: Attention and perception normal.         Mood and Affect: Affect normal. Mood is depressed.         Speech: Speech normal.         Behavior: Behavior normal. Behavior is cooperative.         Thought Content: Thought content does not include suicidal ideation. Thought content does not include suicidal plan.         Cognition and Memory: Memory normal.      Comments: Patient states depressed secondary to colostomy and further need for it, stated to provider that he has no intention of harming himself and is not suicidal at this time.      Exam unchanged from previous on 4/21    Fluids    Intake/Output Summary (Last 24 hours) at 5/1/2021 1318  Last data filed at 5/1/2021 0900  Gross per 24 hour   Intake 600 ml   Output 1500 ml   Net -900 ml     Laboratory          Imaging  [id number] VIEW   Final Result         No significant interval change.      DX-CHEST-LIMITED (1 VIEW)   Final Result         Diffuse interstitial prominence could relate to mild pulmonary edema or atypical infection      [id number] VIEW   Final Result      Increased colonic gas without definite bowel obstruction.      US-RENAL   Final Result      No evidence of hydronephrosis.      Lobulated kidneys bilaterally.      CT-ABDOMEN-PELVIS WITH   Final Result      1.  There is no evidence of small bowel obstruction.   2.  There is a left lower quadrant ostomy.   3.  There is fluid distention of the colon distal to the surgical material in the left mid descending colon extending all the way to the rectum. There is no pneumatosis or free air.   4.  There is cholelithiasis  "without biliary dilatation.   5.  There has been interval removal of the drainage catheter anterior to the spleen with minimal hypodense area in the anterior spleen again noted. There is no new fluid collection.      IR-US GUIDED PIV   Final Result    Ultrasound-guided PERIPHERAL IV INSERTION performed by    qualified nursing staff as above.      EC-ECHOCARDIOGRAM COMPLETE W/O CONT   Final Result      DX-LUMBAR SPINE-2 OR 3 VIEWS   Final Result      Moderate compression deformity of T11 is new compared to 2018.      Mild wedge deformity of T12 is unchanged.      Degenerative changes including facet arthropathy.      Mild retrolisthesis of L5 on S1 and L3 on L4.            Assessment/Plan  * Suicidal ideation- (present on admission)  Assessment & Plan  -Has history of MDD, recurrent with psychotic features. active SI w/ plan on admission  -Psychiatry following, recommended discontinuation of civil commitment - suspect possible malingering or secondary gain   - Evaluated bedside again and discussed with patient who states he \"wants to live\"   -Psych recommending sitter to ensure patient does not attempt actions that would extend his stay     Orthostatic hypotension- (present on admission)  Assessment & Plan  -Multifactorial including adrenal insufficiency, medications (beta blocker, CCB, tamsulosin), diabetic autonomic dysfunction, and venous insufficiency.  -EF is noted to be 60%, significant improvement from prior (previously 35%).  -Per cardiology, no further cardiac work-up needed.  Recommends follow-up with them as outpatient.  -Fall precautions.  -Continue to educate on the importance of slow positional changes.   - Started PO hydrocortisone 4/7/2021 for AI. Continuing florinef.   -Decreased toprolol which allowed for discontinuation of midodrine  Improved     Paroxysmal atrial fibrillation (HCC)- (present on admission)  Assessment & Plan  -CHADSVASC 3 (HTN, CHF, DM).   -TTE Feb 2021: EF 60%.  -Continue " metoprolol, digoxin, and eliquis.    Chronic heart failure with preserved ejection fraction (HCC)- (present on admission)  Assessment & Plan  -Now stable.  -Pulmonary edema noted in mid-March, was placed on IV diuresis though that was held when he developed a gram-negative gina bacteremia and sepsis.  Currently doing well from a respiratory standpoint, no shortness of breath, lungs clear to auscultation bilaterally, not requiring oxygen supplementation.  -Repeat echocardiogram showed improvement in his ejection fraction to 60%.    -Continue metoprolol succinate.  -Lisinopril held per cardiology recommendations due to hypotension.    Diabetes mellitus type 2, insulin dependent (HCC)- (present on admission)  Assessment & Plan  -A1c 5.2% on 2/14/2021.  -Continue diabetic diet.  -Continue lantus at current dose   -Hypoglycemia protocol in place if needed.      Major depressive disorder, recurrent, severe with psychotic features (HCC)- (present on admission)  Assessment & Plan  -Management as listed under 'anxiety and depression'.    Anxiety and depression- (present on admission)  Assessment & Plan  -Legal hold extended, awaiting acceptance at psych facility ([facility] will take in May).  -Remains expressing suicidal ideations but actions contradict.   -Psychiatry signed off feel he is malingering   -Continue paxil, klonopin, & geodon.     Hypothyroidism- (present on admission)  Assessment & Plan  -TFTs in Feb were WNL. Continue levothyroxine at current dose.     Adrenal insufficiency (HCC)- (present on admission)  Assessment & Plan  -Started PO hydrocortisone 4/7/2021. Continue florinef.   -Needs outpatient endocrinology follow up.  -Monitor potassium while on florinef. 4.4 on 4/7/2021.  -Repeat orthostatics periodically if symptomatic         Obstructive uropathy- (present on admission)  Assessment & Plan  -Trial off of flomax given his hypotension.  Discontinued 4/6/2021. No retention thus far.     Chronic venous  insufficiency of lower extremity- (present on admission)  Assessment & Plan  -Continue compression stockings.    Colostomy present (HCC)- (present on admission)  Assessment & Plan  -History of fall at home Nov 2020 on left side with perforated colon and splenic fluid collection/hematoma s/p segmental colectomy, colostomy, mobilization of the splenic flexure, wound vac, and I&D Nov 2020.  -Wound care consulted for ostomy care, but he was non-participatory. He is now engaging in ostomy care with his bedside RNs, encouraged to be observant but do it independently.  -Patient must be independently able to care for his ostomy before inpatient psychiatry will accept him.  Per surgery Dr. Desouza, ostomy reversal is high risk given patient's complexities. Plan for close multidisciplinary outpatient follow-up for surgery when patients overall medical conditions are more optimized.    Debility- (present on admission)  Assessment & Plan  -Cleared by PT/OT.  -Out of bed for each meal and ambulate in the hallways at least twice per shift.    Mixed hyperlipidemia- (present on admission)  Assessment & Plan  Lab Results   Component Value Date/Time    CHOLSTRLTOT 146 02/14/2021 01:30 AM    LDL 82 02/14/2021 01:30 AM    HDL 32 (A) 02/14/2021 01:30 AM    TRIGLYCERIDE 160 (H) 02/14/2021 01:30 AM   -Continue atorvastatin.     Lower limb amputation, great toe (HCC)- (present on admission)  Assessment & Plan  -History of, 2018.  Site well-healed.    Class 2 severe obesity with serious comorbidity and body mass index (BMI) of 35.0 to 35.9 in adult (HCC)- (present on admission)  Assessment & Plan  Body mass index is 35.19 kg/m².-Trending down.  -Could benefit from outpatient weight loss counseling which can be arranged through his PCP after hospital discharge.       VTE prophylaxis: Eliquis

## 2021-05-01 NOTE — PROGRESS NOTES
Patient with 1:1 sitter at bedside for SI at this time and continues legal hold. Patient up with assist x1 and walker. Patient ambulated twice and encouraged to meet goal of four times per shift. Will monitor.

## 2021-05-01 NOTE — CARE PLAN
Problem: Communication  Goal: The ability to communicate needs accurately and effectively will improve  Outcome: PROGRESSING AS EXPECTED  Note: Patient uses call light appropriately at this time. Patient does not require use of  and can communicate verbally without assistance.      Problem: Safety  Goal: Will remain free from injury  Outcome: PROGRESSING AS EXPECTED  Goal: Will remain free from falls  Outcome: PROGRESSING AS EXPECTED  Note: Patient with 1:1 sitter for SI. Patient remains safe and free from injury at this time.

## 2021-05-02 LAB — GLUCOSE BLD-MCNC: 112 MG/DL (ref 65–99)

## 2021-05-02 PROCEDURE — 700102 HCHG RX REV CODE 250 W/ 637 OVERRIDE(OP): Performed by: HOSPITALIST

## 2021-05-02 PROCEDURE — A9270 NON-COVERED ITEM OR SERVICE: HCPCS | Performed by: HOSPITALIST

## 2021-05-02 PROCEDURE — 700102 HCHG RX REV CODE 250 W/ 637 OVERRIDE(OP): Performed by: STUDENT IN AN ORGANIZED HEALTH CARE EDUCATION/TRAINING PROGRAM

## 2021-05-02 PROCEDURE — 700102 HCHG RX REV CODE 250 W/ 637 OVERRIDE(OP): Performed by: NURSE PRACTITIONER

## 2021-05-02 PROCEDURE — A9270 NON-COVERED ITEM OR SERVICE: HCPCS | Performed by: NURSE PRACTITIONER

## 2021-05-02 PROCEDURE — A9270 NON-COVERED ITEM OR SERVICE: HCPCS | Performed by: STUDENT IN AN ORGANIZED HEALTH CARE EDUCATION/TRAINING PROGRAM

## 2021-05-02 PROCEDURE — 82962 GLUCOSE BLOOD TEST: CPT

## 2021-05-02 PROCEDURE — 90832 PSYTX W PT 30 MINUTES: CPT | Performed by: PSYCHOLOGIST

## 2021-05-02 PROCEDURE — 770001 HCHG ROOM/CARE - MED/SURG/GYN PRIV*

## 2021-05-02 PROCEDURE — 99231 SBSQ HOSP IP/OBS SF/LOW 25: CPT | Performed by: HOSPITALIST

## 2021-05-02 RX ADMIN — ZIPRASIDONE HYDROCHLORIDE 60 MG: 60 CAPSULE ORAL at 20:04

## 2021-05-02 RX ADMIN — HYDROCORTISONE 10 MG: 10 TABLET ORAL at 04:59

## 2021-05-02 RX ADMIN — CLONAZEPAM 0.5 MG: 0.5 TABLET ORAL at 08:00

## 2021-05-02 RX ADMIN — METOPROLOL SUCCINATE 12.5 MG: 25 TABLET, EXTENDED RELEASE ORAL at 04:59

## 2021-05-02 RX ADMIN — APIXABAN 5 MG: 5 TABLET, FILM COATED ORAL at 20:04

## 2021-05-02 RX ADMIN — HYDROCORTISONE 5 MG: 10 TABLET ORAL at 16:41

## 2021-05-02 RX ADMIN — APIXABAN 5 MG: 5 TABLET, FILM COATED ORAL at 08:00

## 2021-05-02 RX ADMIN — PAROXETINE HYDROCHLORIDE 20 MG: 20 TABLET, FILM COATED ORAL at 08:01

## 2021-05-02 RX ADMIN — SIMETHICONE 80 MG: 80 TABLET, CHEWABLE ORAL at 16:41

## 2021-05-02 RX ADMIN — ZIPRASIDONE HYDROCHLORIDE 60 MG: 60 CAPSULE ORAL at 08:01

## 2021-05-02 RX ADMIN — SIMETHICONE 80 MG: 80 TABLET, CHEWABLE ORAL at 04:59

## 2021-05-02 RX ADMIN — DIGOXIN 125 MCG: 125 TABLET ORAL at 16:41

## 2021-05-02 RX ADMIN — POTASSIUM CHLORIDE 20 MEQ: 20 TABLET, EXTENDED RELEASE ORAL at 04:59

## 2021-05-02 RX ADMIN — FLUDROCORTISONE ACETATE 0.1 MG: 0.1 TABLET ORAL at 04:59

## 2021-05-02 RX ADMIN — SIMETHICONE 80 MG: 80 TABLET, CHEWABLE ORAL at 12:36

## 2021-05-02 RX ADMIN — ATORVASTATIN CALCIUM 40 MG: 40 TABLET, FILM COATED ORAL at 16:41

## 2021-05-02 RX ADMIN — INSULIN GLARGINE 5 UNITS: 100 INJECTION, SOLUTION SUBCUTANEOUS at 05:00

## 2021-05-02 RX ADMIN — CLONAZEPAM 1 MG: 1 TABLET ORAL at 16:41

## 2021-05-02 RX ADMIN — LEVOTHYROXINE SODIUM 112 MCG: 0.11 TABLET ORAL at 04:59

## 2021-05-02 ASSESSMENT — ENCOUNTER SYMPTOMS
FEVER: 0
EYE DISCHARGE: 0
DIZZINESS: 0
MYALGIAS: 0
DIARRHEA: 0
WEAKNESS: 0
SPEECH CHANGE: 0
NAUSEA: 0
SHORTNESS OF BREATH: 0
CONSTIPATION: 0
COUGH: 0
DEPRESSION: 1
ABDOMINAL PAIN: 0
ROS GI COMMENTS: COLOSTOMY IN PLACE
EYE PAIN: 0
INSOMNIA: 0
VOMITING: 0
NERVOUS/ANXIOUS: 1
SORE THROAT: 0
HEADACHES: 0
PALPITATIONS: 0
SENSORY CHANGE: 0
FOCAL WEAKNESS: 0

## 2021-05-02 NOTE — CARE PLAN
Problem: Communication  Goal: The ability to communicate needs accurately and effectively will improve  Outcome: PROGRESSING AS EXPECTED  Note: Pt has been educated on importance for appropriately calling for assistance or needs with call light, any questions/concerns answered at this time. Call light close by, will check on patient hourly.       Problem: Safety  Goal: Will remain free from injury  Outcome: PROGRESSING AS EXPECTED  Goal: Will remain free from falls  Outcome: PROGRESSING AS EXPECTED     Problem: Bowel/Gastric:  Goal: Normal bowel function is maintained or improved  Outcome: PROGRESSING AS EXPECTED  Goal: Will not experience complications related to bowel motility  Outcome: PROGRESSING AS EXPECTED     Problem: Knowledge Deficit  Goal: Knowledge of disease process/condition, treatment plan, diagnostic tests, and medications will improve  Outcome: PROGRESSING AS EXPECTED  Goal: Knowledge of the prescribed therapeutic regimen will improve  Outcome: PROGRESSING AS EXPECTED     Problem: Discharge Barriers/Planning  Goal: Patient's continuum of care needs will be met  Outcome: PROGRESSING SLOWER THAN EXPECTED     Problem: Psychosocial Needs:  Goal: Level of anxiety will decrease  Outcome: PROGRESSING AS EXPECTED     Problem: Mobility  Goal: Risk for activity intolerance will decrease  Outcome: PROGRESSING AS EXPECTED  Note: Discussed plan to mobilize to chair, active range of motion with resistance, educated on benefits of mobilization, risks of activity intolerance.

## 2021-05-02 NOTE — CARE PLAN
Problem: Safety  Goal: Will remain free from falls  Outcome: PROGRESSING AS EXPECTED     Problem: Venous Thromboembolism (VTW)/Deep Vein Thrombosis (DVT) Prevention:  Goal: Patient will participate in Venous Thrombosis (VTE)/Deep Vein Thrombosis (DVT)Prevention Measures  Outcome: PROGRESSING AS EXPECTED     Problem: Bowel/Gastric:  Goal: Normal bowel function is maintained or improved  Outcome: PROGRESSING SLOWER THAN EXPECTED

## 2021-05-02 NOTE — PROGRESS NOTES
With patient’s permission (if able), attempted to contact designated support person, name Jean BUENO, left voicemail with number to call back.    1403: updated Jean BUENO

## 2021-05-02 NOTE — CONSULTS
"PSYCHOLOGICAL FOLLOW-UP:  Reason for admission: Syncope and collapse [R55]  Suicidal ideation [R45.851]  Sepsis due to Klebsiella pneumoniae (HCC) [A41.4]  Length of Visit: 30min    Legal status: Legal Status: Involuntary    Chief Complaint: \"I think I'll be ok.\"    HPI: Met with the patient to assess risk level and provide crisis intervention services. Patient presented with a euthymic affect but reported an \"anxious\" mood. He stated that his anxiety was connected to not being able to walk around the unit because there was no one available to walk with him. He denied suicidal ideations with plan and intent. When asked how he has been coping with his frustration, he stated, \"I'm coping.\" and smiled. He also reported feeling upset yesterday when he thought that he was being blamed for his ostomy leaking. He endorsed experiencing suicidal thoughts but denied a plan or intent. When asked how he coped with those thoughts, he stated that he was able to \"ignore them.\" Per chart review, the patient has not made any suicidal statements nor engaged in suicidal behavior since 4/26/21. He consistently denies SI with plan and intent to this writer, his hospitalist, and the other mental health team member who saw him on 4/27/21. He continues to engage in behavior that shows future orientation including ADLs (he was clean shaven today), walk around the unit when he can, and accept treatment. He also continues to state a desire to live. Explained to the patient that this writer would ask that he continue to have a tele-sitter in the room and if he did anything that could be interpreted as suicidal in nature, he would be back on a legal hold and transferred to a psychiatric facility. The patient stated his understanding and again confirmed that he was not suicidal and does not have a plan to commit suicide.     Psychiatric Examination:  Vitals: Blood pressure 124/71, pulse 85, temperature 36.2 °C (97.2 °F), temperature source " "Temporal, resp. rate 17, height 1.829 m (6'), weight 116 kg (256 lb 13.4 oz), SpO2 94 %.  Musculoskeletal: normal psychomotor activity, no tics or unusual mannerisms noted  Appearance and Eye Contact: appropriate dress and grooming. Behavior is calm, cooperative,  appropriate eye contact  Attention/Alertness: Alert  Thought Process: Linear, Logical and Goal Directed    Thought Content: No psychotic processes noted  Speech: Clear with normal rate and rhythm  Mood: \"anxious\"            Affect: smiling, euthymic         SI/HI: Denies    Memory: Recent and remote memory appear intact    Orientation: alert, oriented to person, place and time  Insight into symptoms: fair  Judgement into symptoms:fair    ASSESSMENT:     The patient continues to deny active suicidality with plan and intent. He does experience chronic suicidal thoughts however these thoughts are not always accompanied by intent. He continues to engage in future oriented behaviors including accepting treatment and engaging in ADLs. He also continues to deny attempting suicide by wrapping the call light around his neck and at this point this writer cannot confirm the details surrounding the event and the intent  behind the actions (suicidal vs. secondary gain). Nevertheless, he has now denied suicidal ideations, plans, and intent and engaged in future oriented behavior for the past 7 days. It should also be noted that he was denying suicidality and demonstrating this same future orientation to this writer and the team psychiatrist prior to 4/19/21 which resulted in the discontinuation of his court commitment. He then continued to reach out to his care team (including the hosptialist) when he was experiencing suicidal thoughts and would deny intent to act on those thoughts as well as state a desire to live. He even denied intent to act on those thoughts to nursing (see notes) two hours before the telesitter notified nursing about the call light incident. Again, " the intent behind the actions he took with the call light is not completely clear (suicidal vs. Secondary gain) however it does appear to have likely been impulsive in nature. Given the above stated information, the patient no longer meets criteria for a legal hold. LEGAL HOLD DISCONTINUED.     As the patient can be impulsive, please keep a tele-sitter in his room at all times until he is discharged from the hospital. If he engages in any actions that appear suicidal in nature and/or he endorses suicidal thoughts WITH INTENT, please restart the legal hold and refer him to inpatient psychiatric hospitals.     DSM5 Diagnostic Considerations:   Unspecified Personality Disorder   -Borderline? Dependent?  Major Depressive Disorder, recurrent, severe, with psychotic features        PLAN:  Legal status: Voluntary. LEGAL HOLD DISCONTINUED    PLEASE KEEP TELE-SITTER IN THE ROOM UNTIL DISCHARGED    PLEASE READ ASSESSMENT FOR REASONING BEHIND DISCONTINUATION OF THE LEGAL HOLD    Anticipate F/U while he is in the acute care setting.     Records reviewed: yes    Discussed patient with other provider: yes, team psychiatrist     Will continue to follow for crisis intervention    Thank you for the consult.    Madhuri Fernandez, Ph.D.

## 2021-05-02 NOTE — PROGRESS NOTES
Hospital Medicine Twice Weekly Progress Note    Date of Service  5/2/2021      Chief Complaint  Suicidal ideation    Hospital Course  Mr. Catsro is a 56 year-old man with a past medical history significant for atrial fibrillation, secondary hypercoagulable state, diabetes mellitus complicated by diabetic neuropathy, chronic systolic CHF, colostomy placement, orthostatic hypotension (maintained on florinef), and dyslipidemia who presented to the ED on 2/9/2021 after he became dizzy and had a syncopal episode at home.  In the ED he was noted to be hypotensive and bradycardic. Cardiology was consulted and recommended stopping all AV sanket blocking medications.  He remained orthostatic during his hospitalization so midodrine was started.  An echocardiogram was obtained and showed mild mitral regurgitation and an EF of 60% which was improved from his previous echocardiogram obtained on 11/2020 (previously 35%).  CTAs of the head and neck were done and were unremarkable outside of distal bilateral ICA tortuosity.  Also during his hospitalization he expressed desire to kill himself.  He has a colostomy and has become overwhelmed by caring for it.  He was subsequently placed on a legal hold and psychiatry was consulted.  He is now pending [facility] placement at the 6-month db for maturity of his colostomy.    Interval Problem Update  4/20- Patient's civil commitment was discontinued this morning.  Received message from nursing that patient again stating suicidal thoughts to staff.  On discussion with patient he states he is not suicidal at this time and has no intention of hurting himself, that he is just depressed related to his colostomy and having trouble dealing with his medical issues.  Patient reassured provider that he is not suicidal at this time.  Patient encouraged to focus on positive thoughts.  Nursing contacted psychiatry who state legal hold is not necessary at this time.   4/21- Patient again stating he is  "having suicidal ideations and expressed to nursing that he has a plan to slit his throat. Discussed patient with Psychiatry and agree with her assessment that patient makes statements but his actions are not coinciding with his expressions.  Went to bedside and discussed with patient his comments.  Patient directly stated \" I am having those suicidal ideations again\".  Asked patient point blank if he wanted to live at which he stated \" yes I want to live\".  Talked at length with patient regarding possible thoughts that would enter his head and ability to focus on positive aspects.  At end of conversation patient states he had no intention of committing suicide and will focus on getting well.  Questionable secondary gain as motive for daily expression of suicidal ideation.   4/24- Patient resting in bed, states he continues to have suicidal thoughts because he is upset at having a colostomy, but then states he wants to live and get better. PT  Re-evaluated patient and feel he is at baseline and ok to return to group home. Will discuss with case management on availability to return to former living facility. Discontinued midodrine, blood pressure remaining elevated with lower dose metoprolol.   4/27: Patient was placed back on legal hold.  Legal hold paperwork is filled out and psych facility transfer is pending.  4/28: Psych facility transfer is pending.  I will discuss with surgery whether to reverse his colostomy bag is a possibility as inpatient.  4/29: No acute issues per patient today.  We will continue to evaluate for psych facility.  4/30 seconds extended legal hold.  Continue to find psych facility for placement.  This may take a while since he has a colostomy bag and the facility will take him after 6 months since has been placed.  5/1: No acute issues today  5/2: No acute issues today and will continue to wait for placement.  Consultants/Specialty  Cardiology  Psychiatry  General surgery    Code " Status  Full Code    Disposition  TBD- Civil commitment discontinued-working on discharge with case management    Review of Systems  Review of Systems   Constitutional: Negative for fever and malaise/fatigue.   HENT: Negative for congestion and sore throat.    Eyes: Negative for pain and discharge.   Respiratory: Negative for cough and shortness of breath.    Cardiovascular: Positive for leg swelling. Negative for chest pain and palpitations.   Gastrointestinal: Negative for abdominal pain, constipation, diarrhea, nausea and vomiting.        Colostomy in place    Genitourinary: Negative for dysuria, frequency and urgency.   Musculoskeletal: Negative for myalgias.   Neurological: Negative for dizziness, sensory change, speech change, focal weakness, weakness and headaches.   Psychiatric/Behavioral: Positive for depression. Negative for suicidal ideas. The patient is nervous/anxious. The patient does not have insomnia.       Physical Exam  Temp:  [36.1 °C (97 °F)-37.2 °C (99 °F)] 36.2 °C (97.2 °F)  Pulse:  [69-85] 85  Resp:  [16-18] 17  BP: (124-145)/(71-88) 124/71  SpO2:  [94 %-96 %] 94 %    Physical Exam  Vitals and nursing note reviewed.   Constitutional:       General: He is awake. He is not in acute distress.     Appearance: Normal appearance. He is well-developed. He is not ill-appearing.   HENT:      Head: Normocephalic and atraumatic.      Mouth/Throat:      Lips: Pink.      Mouth: Mucous membranes are moist.   Eyes:      General: Lids are normal.      Conjunctiva/sclera: Conjunctivae normal.      Pupils: Pupils are equal, round, and reactive to light.   Cardiovascular:      Rate and Rhythm: Normal rate. Rhythm irregularly irregular.      Pulses: Normal pulses.      Heart sounds: Normal heart sounds.   Pulmonary:      Effort: Pulmonary effort is normal.      Breath sounds: Normal breath sounds. No decreased breath sounds or wheezing.   Abdominal:      General: Bowel sounds are normal. There is no distension.       Palpations: Abdomen is soft.      Tenderness: There is no abdominal tenderness.       Musculoskeletal:      Cervical back: Neck supple.      Right lower leg: Edema (stable today) present.      Left lower leg: Edema (stable today) present.   Skin:     General: Skin is warm and dry.      Coloration: Skin is pale.   Neurological:      General: No focal deficit present.      Mental Status: He is alert.   Psychiatric:         Attention and Perception: Attention and perception normal.         Mood and Affect: Affect normal. Mood is depressed.         Speech: Speech normal.         Behavior: Behavior normal. Behavior is cooperative.         Thought Content: Thought content does not include suicidal ideation. Thought content does not include suicidal plan.         Cognition and Memory: Memory normal.      Comments: Patient states depressed secondary to colostomy and further need for it, stated to provider that he has no intention of harming himself and is not suicidal at this time.          Fluids    Intake/Output Summary (Last 24 hours) at 5/2/2021 1143  Last data filed at 5/2/2021 0500  Gross per 24 hour   Intake --   Output 650 ml   Net -650 ml     Laboratory          Imaging  [id number] VIEW   Final Result         No significant interval change.      DX-CHEST-LIMITED (1 VIEW)   Final Result         Diffuse interstitial prominence could relate to mild pulmonary edema or atypical infection      [id number] VIEW   Final Result      Increased colonic gas without definite bowel obstruction.      US-RENAL   Final Result      No evidence of hydronephrosis.      Lobulated kidneys bilaterally.      CT-ABDOMEN-PELVIS WITH   Final Result      1.  There is no evidence of small bowel obstruction.   2.  There is a left lower quadrant ostomy.   3.  There is fluid distention of the colon distal to the surgical material in the left mid descending colon extending all the way to the rectum. There is no pneumatosis or free air.  "  4.  There is cholelithiasis without biliary dilatation.   5.  There has been interval removal of the drainage catheter anterior to the spleen with minimal hypodense area in the anterior spleen again noted. There is no new fluid collection.      IR-US GUIDED PIV   Final Result    Ultrasound-guided PERIPHERAL IV INSERTION performed by    qualified nursing staff as above.      EC-ECHOCARDIOGRAM COMPLETE W/O CONT   Final Result      DX-LUMBAR SPINE-2 OR 3 VIEWS   Final Result      Moderate compression deformity of T11 is new compared to 2018.      Mild wedge deformity of T12 is unchanged.      Degenerative changes including facet arthropathy.      Mild retrolisthesis of L5 on S1 and L3 on L4.            Assessment/Plan  * Suicidal ideation- (present on admission)  Assessment & Plan  -Has history of MDD, recurrent with psychotic features. active SI w/ plan on admission  -Psychiatry following, recommended discontinuation of civil commitment - suspect possible malingering or secondary gain   - Evaluated bedside again and discussed with patient who states he \"wants to live\"   -Psych recommending sitter to ensure patient does not attempt actions that would extend his stay     Orthostatic hypotension- (present on admission)  Assessment & Plan  -Multifactorial including adrenal insufficiency, medications (beta blocker, CCB, tamsulosin), diabetic autonomic dysfunction, and venous insufficiency.  -EF is noted to be 60%, significant improvement from prior (previously 35%).  -Per cardiology, no further cardiac work-up needed.  Recommends follow-up with them as outpatient.  -Fall precautions.  -Continue to educate on the importance of slow positional changes.   - Started PO hydrocortisone 4/7/2021 for AI. Continuing florinef.   -Decreased toprolol which allowed for discontinuation of midodrine  Improved     Paroxysmal atrial fibrillation (HCC)- (present on admission)  Assessment & Plan  -CHADSVASC 3 (HTN, CHF, DM).   -TTE Feb " 2021: EF 60%.  -Continue metoprolol, digoxin, and eliquis.    Chronic heart failure with preserved ejection fraction (HCC)- (present on admission)  Assessment & Plan  -Now stable.  -Pulmonary edema noted in mid-March, was placed on IV diuresis though that was held when he developed a gram-negative gina bacteremia and sepsis.  Currently doing well from a respiratory standpoint, no shortness of breath, lungs clear to auscultation bilaterally, not requiring oxygen supplementation.  -Repeat echocardiogram showed improvement in his ejection fraction to 60%.    -Continue metoprolol succinate.  -Lisinopril held per cardiology recommendations due to hypotension.    Diabetes mellitus type 2, insulin dependent (HCC)- (present on admission)  Assessment & Plan  -A1c 5.2% on 2/14/2021.  -Continue diabetic diet.  -Continue lantus at current dose   -Hypoglycemia protocol in place if needed.      Major depressive disorder, recurrent, severe with psychotic features (HCC)- (present on admission)  Assessment & Plan  -Management as listed under 'anxiety and depression'.    Anxiety and depression- (present on admission)  Assessment & Plan  -Legal hold extended, awaiting acceptance at psych facility ([facility] will take in May).  -Remains expressing suicidal ideations but actions contradict.   -Psychiatry signed off feel he is malingering   -Continue paxil, klonopin, & geodon.     Hypothyroidism- (present on admission)  Assessment & Plan  -TFTs in Feb were WNL. Continue levothyroxine at current dose.     Adrenal insufficiency (HCC)- (present on admission)  Assessment & Plan  -Started PO hydrocortisone 4/7/2021. Continue florinef.   -Needs outpatient endocrinology follow up.  -Monitor potassium while on florinef. 4.4 on 4/7/2021.  -Repeat orthostatics periodically if symptomatic         Obstructive uropathy- (present on admission)  Assessment & Plan  -Trial off of flomax given his hypotension.  Discontinued 4/6/2021. No retention thus far.      Chronic venous insufficiency of lower extremity- (present on admission)  Assessment & Plan  -Continue compression stockings.    Colostomy present (HCC)- (present on admission)  Assessment & Plan  -History of fall at home Nov 2020 on left side with perforated colon and splenic fluid collection/hematoma s/p segmental colectomy, colostomy, mobilization of the splenic flexure, wound vac, and I&D Nov 2020.  -Wound care consulted for ostomy care, but he was non-participatory. He is now engaging in ostomy care with his bedside RNs, encouraged to be observant but do it independently.  -Patient must be independently able to care for his ostomy before inpatient psychiatry will accept him.  Per surgery Dr. Desouza, ostomy reversal is high risk given patient's complexities. Plan for close multidisciplinary outpatient follow-up for surgery when patients overall medical conditions are more optimized.    Debility- (present on admission)  Assessment & Plan  -Cleared by PT/OT.  -Out of bed for each meal and ambulate in the hallways at least twice per shift.    Mixed hyperlipidemia- (present on admission)  Assessment & Plan  Lab Results   Component Value Date/Time    CHOLSTRLTOT 146 02/14/2021 01:30 AM    LDL 82 02/14/2021 01:30 AM    HDL 32 (A) 02/14/2021 01:30 AM    TRIGLYCERIDE 160 (H) 02/14/2021 01:30 AM   -Continue atorvastatin.     Lower limb amputation, great toe (HCC)- (present on admission)  Assessment & Plan  -History of, 2018.  Site well-healed.    Class 2 severe obesity with serious comorbidity and body mass index (BMI) of 35.0 to 35.9 in adult (HCC)- (present on admission)  Assessment & Plan  Body mass index is 35.19 kg/m².-Trending down.  -Could benefit from outpatient weight loss counseling which can be arranged through his PCP after hospital discharge.       VTE prophylaxis: Eliquis

## 2021-05-03 PROCEDURE — 700102 HCHG RX REV CODE 250 W/ 637 OVERRIDE(OP): Performed by: STUDENT IN AN ORGANIZED HEALTH CARE EDUCATION/TRAINING PROGRAM

## 2021-05-03 PROCEDURE — 700102 HCHG RX REV CODE 250 W/ 637 OVERRIDE(OP): Performed by: NURSE PRACTITIONER

## 2021-05-03 PROCEDURE — A9270 NON-COVERED ITEM OR SERVICE: HCPCS | Performed by: STUDENT IN AN ORGANIZED HEALTH CARE EDUCATION/TRAINING PROGRAM

## 2021-05-03 PROCEDURE — 99232 SBSQ HOSP IP/OBS MODERATE 35: CPT | Performed by: HOSPITALIST

## 2021-05-03 PROCEDURE — 770001 HCHG ROOM/CARE - MED/SURG/GYN PRIV*

## 2021-05-03 PROCEDURE — A9270 NON-COVERED ITEM OR SERVICE: HCPCS | Performed by: NURSE PRACTITIONER

## 2021-05-03 PROCEDURE — 700102 HCHG RX REV CODE 250 W/ 637 OVERRIDE(OP): Performed by: HOSPITALIST

## 2021-05-03 PROCEDURE — A9270 NON-COVERED ITEM OR SERVICE: HCPCS | Performed by: HOSPITALIST

## 2021-05-03 RX ADMIN — HYDROCORTISONE 5 MG: 10 TABLET ORAL at 15:21

## 2021-05-03 RX ADMIN — SIMETHICONE 80 MG: 80 TABLET, CHEWABLE ORAL at 04:36

## 2021-05-03 RX ADMIN — SIMETHICONE 80 MG: 80 TABLET, CHEWABLE ORAL at 12:11

## 2021-05-03 RX ADMIN — ZIPRASIDONE HYDROCHLORIDE 60 MG: 60 CAPSULE ORAL at 20:03

## 2021-05-03 RX ADMIN — FLUDROCORTISONE ACETATE 0.1 MG: 0.1 TABLET ORAL at 04:36

## 2021-05-03 RX ADMIN — POTASSIUM CHLORIDE 20 MEQ: 20 TABLET, EXTENDED RELEASE ORAL at 04:36

## 2021-05-03 RX ADMIN — SIMETHICONE 80 MG: 80 TABLET, CHEWABLE ORAL at 16:14

## 2021-05-03 RX ADMIN — INSULIN GLARGINE 5 UNITS: 100 INJECTION, SOLUTION SUBCUTANEOUS at 04:36

## 2021-05-03 RX ADMIN — ZIPRASIDONE HYDROCHLORIDE 60 MG: 60 CAPSULE ORAL at 08:16

## 2021-05-03 RX ADMIN — APIXABAN 5 MG: 5 TABLET, FILM COATED ORAL at 08:17

## 2021-05-03 RX ADMIN — LEVOTHYROXINE SODIUM 112 MCG: 0.11 TABLET ORAL at 04:36

## 2021-05-03 RX ADMIN — APIXABAN 5 MG: 5 TABLET, FILM COATED ORAL at 20:03

## 2021-05-03 RX ADMIN — PAROXETINE HYDROCHLORIDE 20 MG: 20 TABLET, FILM COATED ORAL at 08:17

## 2021-05-03 RX ADMIN — DIGOXIN 125 MCG: 125 TABLET ORAL at 16:14

## 2021-05-03 RX ADMIN — CLONAZEPAM 1 MG: 1 TABLET ORAL at 16:13

## 2021-05-03 RX ADMIN — METOPROLOL SUCCINATE 12.5 MG: 25 TABLET, EXTENDED RELEASE ORAL at 04:36

## 2021-05-03 RX ADMIN — CLONAZEPAM 0.5 MG: 0.5 TABLET ORAL at 08:17

## 2021-05-03 RX ADMIN — ATORVASTATIN CALCIUM 40 MG: 40 TABLET, FILM COATED ORAL at 16:13

## 2021-05-03 RX ADMIN — HYDROCORTISONE 10 MG: 10 TABLET ORAL at 04:38

## 2021-05-03 ASSESSMENT — ENCOUNTER SYMPTOMS
DIZZINESS: 0
NAUSEA: 0
NERVOUS/ANXIOUS: 1
WEAKNESS: 0
FOCAL WEAKNESS: 0
MYALGIAS: 0
FEVER: 0
PALPITATIONS: 0
DIARRHEA: 0
EYE DISCHARGE: 0
COUGH: 0
HEADACHES: 0
DEPRESSION: 1
SENSORY CHANGE: 0
INSOMNIA: 0
EYE PAIN: 0
CONSTIPATION: 0
SPEECH CHANGE: 0
SORE THROAT: 0
SHORTNESS OF BREATH: 0
ABDOMINAL PAIN: 0
VOMITING: 0
ROS GI COMMENTS: COLOSTOMY IN PLACE

## 2021-05-03 NOTE — CARE PLAN
Problem: Communication  Goal: The ability to communicate needs accurately and effectively will improve  Outcome: PROGRESSING AS EXPECTED  Note: Pt has been educated on importance for appropriately calling for assistance or needs with call light, any questions/concerns answered at this time. Call light close by, will check on patient hourly.       Problem: Safety  Goal: Will remain free from injury  Outcome: PROGRESSING AS EXPECTED  Note: Discussed patient mobility status with interdisciplinary team, fall precautions in place, pt fall prevention education done, pt educated to call for assistance when needed.     Goal: Will remain free from falls  Outcome: PROGRESSING AS EXPECTED     Problem: Bowel/Gastric:  Goal: Normal bowel function is maintained or improved  Outcome: PROGRESSING AS EXPECTED  Note: Pt has been passing stool regularly with no issues or deviations from baseline. Last BM 5/2/21      Goal: Will not experience complications related to bowel motility  Outcome: PROGRESSING AS EXPECTED     Problem: Discharge Barriers/Planning  Goal: Patient's continuum of care needs will be met  Outcome: PROGRESSING SLOWER THAN EXPECTED     Problem: Psychosocial Needs:  Goal: Level of anxiety will decrease  Outcome: PROGRESSING AS EXPECTED     Problem: Mobility  Goal: Risk for activity intolerance will decrease  Outcome: PROGRESSING AS EXPECTED  Note: Discussed plan to mobilize, active range of motion with resistance, educated on benefits of mobilization, risks of activity intolerance.       Problem: Skin Integrity  Goal: Risk for impaired skin integrity will decrease  Outcome: PROGRESSING AS EXPECTED

## 2021-05-03 NOTE — PROGRESS NOTES
Hospital Medicine Twice Weekly Progress Note    Date of Service  5/3/2021      Chief Complaint  Suicidal ideation    Hospital Course  Mr. Castro is a 56 year-old man with a past medical history significant for atrial fibrillation, secondary hypercoagulable state, diabetes mellitus complicated by diabetic neuropathy, chronic systolic CHF, colostomy placement, orthostatic hypotension (maintained on florinef), and dyslipidemia who presented to the ED on 2/9/2021 after he became dizzy and had a syncopal episode at home.  In the ED he was noted to be hypotensive and bradycardic. Cardiology was consulted and recommended stopping all AV sanket blocking medications.  He remained orthostatic during his hospitalization so midodrine was started.  An echocardiogram was obtained and showed mild mitral regurgitation and an EF of 60% which was improved from his previous echocardiogram obtained on 11/2020 (previously 35%).  CTAs of the head and neck were done and were unremarkable outside of distal bilateral ICA tortuosity.  Also during his hospitalization he expressed desire to kill himself.  He has a colostomy and has become overwhelmed by caring for it.  He was subsequently placed on a legal hold and psychiatry was consulted.  He is now pending [facility] placement at the 6-month db for maturity of his colostomy.    Interval Problem Update  4/20- Patient's civil commitment was discontinued this morning.  Received message from nursing that patient again stating suicidal thoughts to staff.  On discussion with patient he states he is not suicidal at this time and has no intention of hurting himself, that he is just depressed related to his colostomy and having trouble dealing with his medical issues.  Patient reassured provider that he is not suicidal at this time.  Patient encouraged to focus on positive thoughts.  Nursing contacted psychiatry who state legal hold is not necessary at this time.   4/21- Patient again stating he is  "having suicidal ideations and expressed to nursing that he has a plan to slit his throat. Discussed patient with Psychiatry and agree with her assessment that patient makes statements but his actions are not coinciding with his expressions.  Went to bedside and discussed with patient his comments.  Patient directly stated \" I am having those suicidal ideations again\".  Asked patient point blank if he wanted to live at which he stated \" yes I want to live\".  Talked at length with patient regarding possible thoughts that would enter his head and ability to focus on positive aspects.  At end of conversation patient states he had no intention of committing suicide and will focus on getting well.  Questionable secondary gain as motive for daily expression of suicidal ideation.   4/24- Patient resting in bed, states he continues to have suicidal thoughts because he is upset at having a colostomy, but then states he wants to live and get better. PT  Re-evaluated patient and feel he is at baseline and ok to return to group home. Will discuss with case management on availability to return to former living facility. Discontinued midodrine, blood pressure remaining elevated with lower dose metoprolol.   4/27: Patient was placed back on legal hold.  Legal hold paperwork is filled out and psych facility transfer is pending.  4/28: Psych facility transfer is pending.  I will discuss with surgery whether to reverse his colostomy bag is a possibility as inpatient.  4/29: No acute issues per patient today.  We will continue to evaluate for psych facility.  4/30 seconds extended legal hold.  Continue to find psych facility for placement.  This may take a while since he has a colostomy bag and the facility will take him after 6 months since has been placed.  5/1: No acute issues today  5/2: No acute issues today and will continue to wait for placement.  5/3: Legal hold discontinued by psychiatry as they believe it was due to secondary " gain rather than suicide intent.  We will find placement for this patient now.  Consultants/Specialty  Cardiology  Psychiatry  General surgery    Code Status  Full Code    Disposition  TBD- Civil commitment discontinued-working on discharge with case management    Review of Systems  Review of Systems   Constitutional: Negative for fever and malaise/fatigue.   HENT: Negative for congestion and sore throat.    Eyes: Negative for pain and discharge.   Respiratory: Negative for cough and shortness of breath.    Cardiovascular: Positive for leg swelling. Negative for chest pain and palpitations.   Gastrointestinal: Negative for abdominal pain, constipation, diarrhea, nausea and vomiting.        Colostomy in place    Genitourinary: Negative for dysuria, frequency and urgency.   Musculoskeletal: Negative for myalgias.   Neurological: Negative for dizziness, sensory change, speech change, focal weakness, weakness and headaches.   Psychiatric/Behavioral: Positive for depression. Negative for suicidal ideas. The patient is nervous/anxious. The patient does not have insomnia.       Physical Exam  Temp:  [36.1 °C (97 °F)-36.7 °C (98 °F)] 36.2 °C (97.2 °F)  Pulse:  [] 94  Resp:  [17-20] 20  BP: ()/(60-74) 95/60  SpO2:  [91 %-95 %] 95 %    Physical Exam  Vitals and nursing note reviewed.   Constitutional:       General: He is awake. He is not in acute distress.     Appearance: Normal appearance. He is well-developed. He is not ill-appearing.   HENT:      Head: Normocephalic and atraumatic.      Mouth/Throat:      Lips: Pink.      Mouth: Mucous membranes are moist.   Eyes:      General: Lids are normal.      Conjunctiva/sclera: Conjunctivae normal.      Pupils: Pupils are equal, round, and reactive to light.   Cardiovascular:      Rate and Rhythm: Normal rate. Rhythm irregularly irregular.      Pulses: Normal pulses.      Heart sounds: Normal heart sounds.   Pulmonary:      Effort: Pulmonary effort is normal.       Breath sounds: Normal breath sounds. No decreased breath sounds or wheezing.   Abdominal:      General: Bowel sounds are normal. There is no distension.      Palpations: Abdomen is soft.      Tenderness: There is no abdominal tenderness.       Musculoskeletal:      Cervical back: Neck supple.      Right lower leg: Edema (stable today) present.      Left lower leg: Edema (stable today) present.   Skin:     General: Skin is warm and dry.      Coloration: Skin is pale.   Neurological:      General: No focal deficit present.      Mental Status: He is alert.   Psychiatric:         Attention and Perception: Attention and perception normal.         Mood and Affect: Affect normal. Mood is depressed.         Speech: Speech normal.         Behavior: Behavior normal. Behavior is cooperative.         Thought Content: Thought content does not include suicidal ideation. Thought content does not include suicidal plan.         Cognition and Memory: Memory normal.      Comments: Patient states depressed secondary to colostomy and further need for it, stated to provider that he has no intention of harming himself and is not suicidal at this time.          Fluids    Intake/Output Summary (Last 24 hours) at 5/3/2021 1253  Last data filed at 5/3/2021 0441  Gross per 24 hour   Intake 480 ml   Output 300 ml   Net 180 ml     Laboratory          Imaging  [id number] VIEW   Final Result         No significant interval change.      DX-CHEST-LIMITED (1 VIEW)   Final Result         Diffuse interstitial prominence could relate to mild pulmonary edema or atypical infection      [id number] VIEW   Final Result      Increased colonic gas without definite bowel obstruction.      US-RENAL   Final Result      No evidence of hydronephrosis.      Lobulated kidneys bilaterally.      CT-ABDOMEN-PELVIS WITH   Final Result      1.  There is no evidence of small bowel obstruction.   2.  There is a left lower quadrant ostomy.   3.  There is fluid distention  "of the colon distal to the surgical material in the left mid descending colon extending all the way to the rectum. There is no pneumatosis or free air.   4.  There is cholelithiasis without biliary dilatation.   5.  There has been interval removal of the drainage catheter anterior to the spleen with minimal hypodense area in the anterior spleen again noted. There is no new fluid collection.      IR-US GUIDED PIV   Final Result    Ultrasound-guided PERIPHERAL IV INSERTION performed by    qualified nursing staff as above.      EC-ECHOCARDIOGRAM COMPLETE W/O CONT   Final Result      DX-LUMBAR SPINE-2 OR 3 VIEWS   Final Result      Moderate compression deformity of T11 is new compared to 2018.      Mild wedge deformity of T12 is unchanged.      Degenerative changes including facet arthropathy.      Mild retrolisthesis of L5 on S1 and L3 on L4.            Assessment/Plan  * Suicidal ideation- (present on admission)  Assessment & Plan  -Has history of MDD, recurrent with psychotic features. active SI w/ plan on admission  -Psychiatry following, recommended discontinuation of civil commitment - suspect possible malingering or secondary gain   - Evaluated bedside again and discussed with patient who states he \"wants to live\"   -Psych recommending sitter to ensure patient does not attempt actions that would extend his stay     Orthostatic hypotension- (present on admission)  Assessment & Plan  -Multifactorial including adrenal insufficiency, medications (beta blocker, CCB, tamsulosin), diabetic autonomic dysfunction, and venous insufficiency.  -EF is noted to be 60%, significant improvement from prior (previously 35%).  -Per cardiology, no further cardiac work-up needed.  Recommends follow-up with them as outpatient.  -Fall precautions.  -Continue to educate on the importance of slow positional changes.   - Started PO hydrocortisone 4/7/2021 for AI. Continuing florinef.   -Decreased toprolol which allowed for discontinuation " of midodrine  Improved     Paroxysmal atrial fibrillation (HCC)- (present on admission)  Assessment & Plan  -CHADSVASC 3 (HTN, CHF, DM).   -TTE Feb 2021: EF 60%.  -Continue metoprolol, digoxin, and eliquis.    Chronic heart failure with preserved ejection fraction (HCC)- (present on admission)  Assessment & Plan  -Now stable.  -Pulmonary edema noted in mid-March, was placed on IV diuresis though that was held when he developed a gram-negative gina bacteremia and sepsis.  Currently doing well from a respiratory standpoint, no shortness of breath, lungs clear to auscultation bilaterally, not requiring oxygen supplementation.  -Repeat echocardiogram showed improvement in his ejection fraction to 60%.    -Continue metoprolol succinate.  -Lisinopril held per cardiology recommendations due to hypotension.    Diabetes mellitus type 2, insulin dependent (HCC)- (present on admission)  Assessment & Plan  -A1c 5.2% on 2/14/2021.  -Continue diabetic diet.  -Continue lantus at current dose   -Hypoglycemia protocol in place if needed.      Major depressive disorder, recurrent, severe with psychotic features (HCC)- (present on admission)  Assessment & Plan  -Management as listed under 'anxiety and depression'.    Anxiety and depression- (present on admission)  Assessment & Plan  -Legal hold extended, awaiting acceptance at psych facility ([facility] will take in May).  -Remains expressing suicidal ideations but actions contradict.   -Psychiatry signed off feel he is malingering   -Continue paxil, klonopin, & geodon.     Hypothyroidism- (present on admission)  Assessment & Plan  -TFTs in Feb were WNL. Continue levothyroxine at current dose.     Adrenal insufficiency (HCC)- (present on admission)  Assessment & Plan  -Started PO hydrocortisone 4/7/2021. Continue florinef.   -Needs outpatient endocrinology follow up.  -Monitor potassium while on florinef. 4.4 on 4/7/2021.  -Repeat orthostatics periodically if symptomatic         Obstructive  uropathy- (present on admission)  Assessment & Plan  -Trial off of flomax given his hypotension.  Discontinued 4/6/2021. No retention thus far.     Chronic venous insufficiency of lower extremity- (present on admission)  Assessment & Plan  -Continue compression stockings.    Colostomy present (HCC)- (present on admission)  Assessment & Plan  -History of fall at home Nov 2020 on left side with perforated colon and splenic fluid collection/hematoma s/p segmental colectomy, colostomy, mobilization of the splenic flexure, wound vac, and I&D Nov 2020.  -Wound care consulted for ostomy care, but he was non-participatory. He is now engaging in ostomy care with his bedside RNs, encouraged to be observant but do it independently.  -Patient must be independently able to care for his ostomy before inpatient psychiatry will accept him.  Per surgery Dr. Desouza, ostomy reversal is high risk given patient's complexities. Plan for close multidisciplinary outpatient follow-up for surgery when patients overall medical conditions are more optimized.    Debility- (present on admission)  Assessment & Plan  -Cleared by PT/OT.  -Out of bed for each meal and ambulate in the hallways at least twice per shift.    Mixed hyperlipidemia- (present on admission)  Assessment & Plan  Lab Results   Component Value Date/Time    CHOLSTRLTOT 146 02/14/2021 01:30 AM    LDL 82 02/14/2021 01:30 AM    HDL 32 (A) 02/14/2021 01:30 AM    TRIGLYCERIDE 160 (H) 02/14/2021 01:30 AM   -Continue atorvastatin.     Lower limb amputation, great toe (HCC)- (present on admission)  Assessment & Plan  -History of, 2018.  Site well-healed.    Class 2 severe obesity with serious comorbidity and body mass index (BMI) of 35.0 to 35.9 in adult (HCC)- (present on admission)  Assessment & Plan  Body mass index is 35.19 kg/m².-Trending down.  -Could benefit from outpatient weight loss counseling which can be arranged through his PCP after hospital discharge.       VTE  prophylaxis: Eliquis

## 2021-05-03 NOTE — DISCHARGE PLANNING
Anticipated Discharge Disposition: TBD-[facility] placement    Action:   Discussed in IDT rounds; pt's legal hold discontinued yesterday. Pt will need placement in group home.     CM spoke with pt regarding discharge plan. Pt reports he would like to return to [facility] but is not sure if they will take him back. CM notified pt his Medicaid has changed from managed care [place] [employer] to FFS. [facility] does not take FFS. Pt reports he is open to going to SNF; CM advised pt he does not have a skilled need for SNF at this time but pt is likely to qualify for [facility]. Pt agreeable to group home.     Pt provided this CM with the number for his rep payee ([facility]-[phone]) and requested I speak with Karol for information about his income. CM placed call and left  for return call.    Barriers to Discharge:    placement  Payor source: Medicaid FFS/[facility] waiver/[facility]    Plan: Care coordination will reach out to  for available openings and rep payee to assist with placement.       1610-Called to patient's bedside. Pt reports his wallet was lost on day of admission; states [employer] lost it when he was picked up at [facility]. CM notified charge and bedside RN; requested a review of pt's belongings for wallet.

## 2021-05-04 LAB — GLUCOSE BLD-MCNC: 117 MG/DL (ref 65–99)

## 2021-05-04 PROCEDURE — A9270 NON-COVERED ITEM OR SERVICE: HCPCS | Performed by: STUDENT IN AN ORGANIZED HEALTH CARE EDUCATION/TRAINING PROGRAM

## 2021-05-04 PROCEDURE — 700102 HCHG RX REV CODE 250 W/ 637 OVERRIDE(OP): Performed by: STUDENT IN AN ORGANIZED HEALTH CARE EDUCATION/TRAINING PROGRAM

## 2021-05-04 PROCEDURE — 82962 GLUCOSE BLOOD TEST: CPT

## 2021-05-04 PROCEDURE — 99232 SBSQ HOSP IP/OBS MODERATE 35: CPT | Performed by: STUDENT IN AN ORGANIZED HEALTH CARE EDUCATION/TRAINING PROGRAM

## 2021-05-04 PROCEDURE — 700102 HCHG RX REV CODE 250 W/ 637 OVERRIDE(OP): Performed by: NURSE PRACTITIONER

## 2021-05-04 PROCEDURE — A9270 NON-COVERED ITEM OR SERVICE: HCPCS | Performed by: HOSPITALIST

## 2021-05-04 PROCEDURE — A9270 NON-COVERED ITEM OR SERVICE: HCPCS | Performed by: NURSE PRACTITIONER

## 2021-05-04 PROCEDURE — 700102 HCHG RX REV CODE 250 W/ 637 OVERRIDE(OP): Performed by: HOSPITALIST

## 2021-05-04 PROCEDURE — 770001 HCHG ROOM/CARE - MED/SURG/GYN PRIV*

## 2021-05-04 RX ADMIN — DIGOXIN 125 MCG: 125 TABLET ORAL at 17:31

## 2021-05-04 RX ADMIN — CLONAZEPAM 0.5 MG: 0.5 TABLET ORAL at 09:05

## 2021-05-04 RX ADMIN — ATORVASTATIN CALCIUM 40 MG: 40 TABLET, FILM COATED ORAL at 17:32

## 2021-05-04 RX ADMIN — APIXABAN 5 MG: 5 TABLET, FILM COATED ORAL at 19:51

## 2021-05-04 RX ADMIN — METOPROLOL SUCCINATE 12.5 MG: 25 TABLET, EXTENDED RELEASE ORAL at 05:41

## 2021-05-04 RX ADMIN — ZIPRASIDONE HYDROCHLORIDE 60 MG: 60 CAPSULE ORAL at 09:05

## 2021-05-04 RX ADMIN — POTASSIUM CHLORIDE 20 MEQ: 20 TABLET, EXTENDED RELEASE ORAL at 05:40

## 2021-05-04 RX ADMIN — SIMETHICONE 80 MG: 80 TABLET, CHEWABLE ORAL at 05:40

## 2021-05-04 RX ADMIN — SIMETHICONE 80 MG: 80 TABLET, CHEWABLE ORAL at 17:31

## 2021-05-04 RX ADMIN — LEVOTHYROXINE SODIUM 112 MCG: 0.11 TABLET ORAL at 05:41

## 2021-05-04 RX ADMIN — CLONAZEPAM 1 MG: 1 TABLET ORAL at 17:32

## 2021-05-04 RX ADMIN — HYDROCORTISONE 10 MG: 10 TABLET ORAL at 05:40

## 2021-05-04 RX ADMIN — PAROXETINE HYDROCHLORIDE 20 MG: 20 TABLET, FILM COATED ORAL at 09:05

## 2021-05-04 RX ADMIN — FLUDROCORTISONE ACETATE 0.1 MG: 0.1 TABLET ORAL at 05:40

## 2021-05-04 RX ADMIN — HYDROCORTISONE 5 MG: 10 TABLET ORAL at 15:47

## 2021-05-04 RX ADMIN — SIMETHICONE 80 MG: 80 TABLET, CHEWABLE ORAL at 12:17

## 2021-05-04 RX ADMIN — ZIPRASIDONE HYDROCHLORIDE 60 MG: 60 CAPSULE ORAL at 19:51

## 2021-05-04 RX ADMIN — APIXABAN 5 MG: 5 TABLET, FILM COATED ORAL at 09:05

## 2021-05-04 RX ADMIN — INSULIN GLARGINE 5 UNITS: 100 INJECTION, SOLUTION SUBCUTANEOUS at 05:41

## 2021-05-04 ASSESSMENT — ENCOUNTER SYMPTOMS
ROS GI COMMENTS: COLOSTOMY IN PLACE
COUGH: 0
VOMITING: 0
CONSTIPATION: 0
ABDOMINAL PAIN: 0
WEAKNESS: 0
DIARRHEA: 0
SHORTNESS OF BREATH: 0
DEPRESSION: 0
FEVER: 0
NAUSEA: 0

## 2021-05-04 ASSESSMENT — PAIN DESCRIPTION - PAIN TYPE
TYPE: ACUTE PAIN
TYPE: ACUTE PAIN

## 2021-05-04 NOTE — CARE PLAN
Problem: Safety  Goal: Will remain free from falls  Outcome: PROGRESSING AS EXPECTED   Pt remains free of falls  Problem: Venous Thromboembolism (VTW)/Deep Vein Thrombosis (DVT) Prevention:  Goal: Patient will participate in Venous Thrombosis (VTE)/Deep Vein Thrombosis (DVT)Prevention Measures  Outcome: PROGRESSING AS EXPECTED

## 2021-05-04 NOTE — PROGRESS NOTES
Hospital Medicine Twice Weekly Progress Note    Date of Service  5/4/2021      Chief Complaint  Suicidal ideation    Hospital Course  Mr. Castro is a 56 year-old man with a past medical history significant for atrial fibrillation, secondary hypercoagulable state, diabetes mellitus complicated by diabetic neuropathy, chronic systolic CHF, colostomy placement, orthostatic hypotension (maintained on florinef), and dyslipidemia who presented to the ED on 2/9/2021 after he became dizzy and had a syncopal episode at home.  In the ED he was noted to be hypotensive and bradycardic. Cardiology was consulted and recommended stopping all AV sanket blocking medications.  He remained orthostatic during his hospitalization so midodrine was started.  An echocardiogram was obtained and showed mild mitral regurgitation and an EF of 60% which was improved from his previous echocardiogram obtained on 11/2020 (previously 35%).  CTAs of the head and neck were done and were unremarkable outside of distal bilateral ICA tortuosity.  Also during his hospitalization he expressed desire to kill himself.  He has a colostomy and has become overwhelmed by caring for it.  He was subsequently placed on a legal hold and psychiatry was consulted.  He is now pending [facility] placement at the 6-month db for maturity of his colostomy.    Interval Problem Update  4/20- Patient's civil commitment was discontinued this morning.  Received message from nursing that patient again stating suicidal thoughts to staff.  On discussion with patient he states he is not suicidal at this time and has no intention of hurting himself, that he is just depressed related to his colostomy and having trouble dealing with his medical issues.  Patient reassured provider that he is not suicidal at this time.  Patient encouraged to focus on positive thoughts.  Nursing contacted psychiatry who state legal hold is not necessary at this time.   4/21- Patient again stating he is  "having suicidal ideations and expressed to nursing that he has a plan to slit his throat. Discussed patient with Psychiatry and agree with her assessment that patient makes statements but his actions are not coinciding with his expressions.  Went to bedside and discussed with patient his comments.  Patient directly stated \" I am having those suicidal ideations again\".  Asked patient point blank if he wanted to live at which he stated \" yes I want to live\".  Talked at length with patient regarding possible thoughts that would enter his head and ability to focus on positive aspects.  At end of conversation patient states he had no intention of committing suicide and will focus on getting well.  Questionable secondary gain as motive for daily expression of suicidal ideation.   4/24- Patient resting in bed, states he continues to have suicidal thoughts because he is upset at having a colostomy, but then states he wants to live and get better. PT  Re-evaluated patient and feel he is at baseline and ok to return to group home. Will discuss with case management on availability to return to former living facility. Discontinued midodrine, blood pressure remaining elevated with lower dose metoprolol.   4/27: Patient was placed back on legal hold.  Legal hold paperwork is filled out and psych facility transfer is pending.  4/28: Psych facility transfer is pending.  I will discuss with surgery whether to reverse his colostomy bag is a possibility as inpatient.  4/29: No acute issues per patient today.  We will continue to evaluate for psych facility.  4/30 seconds extended legal hold.  Continue to find psych facility for placement.  This may take a while since he has a colostomy bag and the facility will take him after 6 months since has been placed.  5/1: No acute issues today  5/2: No acute issues today and will continue to wait for placement.  5/3: Legal hold discontinued by psychiatry as they believe it was due to secondary " gain rather than suicide intent.  We will find placement for this patient now.  5/4: Patient has no complaints when I examined the patient.  Later on patient did mention suicide without intent or a plan to the nursing staff it appears that he does use this as a form of malingering when he wants attention.  I did discuss with the patient that we will try to find a group home form.  Blood sugar well controlled glucose 117.  Continue current insulin regimen  T-max 97.9, /89.  Consultants/Specialty  Cardiology  Psychiatry  General surgery    Code Status  Full Code    Disposition  TBD- Civil commitment discontinued-working on discharge with case management    Review of Systems  Review of Systems   Constitutional: Negative for fever and malaise/fatigue.   Respiratory: Negative for cough and shortness of breath.    Cardiovascular: Negative for chest pain and leg swelling.   Gastrointestinal: Negative for abdominal pain, constipation, diarrhea, nausea and vomiting.        Colostomy in place    Neurological: Negative for weakness.   Psychiatric/Behavioral: Negative for depression and suicidal ideas.   All other systems reviewed and are negative.     Physical Exam  Temp:  [36.1 °C (96.9 °F)-36.6 °C (97.9 °F)] 36.3 °C (97.4 °F)  Pulse:  [68-76] 76  Resp:  [18-20] 20  BP: (112-134)/(61-94) 134/94  SpO2:  [97 %] 97 %    Physical Exam  Vitals and nursing note reviewed.   Constitutional:       General: He is awake. He is not in acute distress.     Appearance: Normal appearance. He is well-developed. He is not ill-appearing.   HENT:      Head: Normocephalic and atraumatic.      Mouth/Throat:      Lips: Pink.      Mouth: Mucous membranes are moist.   Eyes:      General: Lids are normal.      Conjunctiva/sclera: Conjunctivae normal.      Pupils: Pupils are equal, round, and reactive to light.   Cardiovascular:      Rate and Rhythm: Normal rate and regular rhythm.      Pulses: Normal pulses.      Heart sounds: Normal heart  sounds.   Pulmonary:      Effort: Pulmonary effort is normal. No respiratory distress.      Breath sounds: Normal breath sounds. No decreased breath sounds or wheezing.   Abdominal:      General: Bowel sounds are normal. There is no distension.      Palpations: Abdomen is soft.      Tenderness: There is no abdominal tenderness. There is no guarding.       Musculoskeletal:      Cervical back: Neck supple.      Right lower leg: No edema.      Left lower leg: No edema.   Skin:     General: Skin is warm and dry.      Coloration: Skin is pale.   Neurological:      General: No focal deficit present.      Mental Status: He is alert and oriented to person, place, and time.      Cranial Nerves: No cranial nerve deficit.   Psychiatric:         Attention and Perception: Attention and perception normal.         Mood and Affect: Affect normal. Mood is depressed.         Speech: Speech normal.         Behavior: Behavior normal. Behavior is cooperative.         Thought Content: Thought content does not include suicidal ideation. Thought content does not include suicidal plan.         Cognition and Memory: Memory normal.      Comments: Patient states depressed secondary to colostomy and further need for it, stated to provider that he has no intention of harming himself and is not suicidal at this time.          Fluids    Intake/Output Summary (Last 24 hours) at 5/4/2021 1415  Last data filed at 5/4/2021 0600  Gross per 24 hour   Intake --   Output 500 ml   Net -500 ml     Laboratory          Imaging  [id number] VIEW   Final Result         No significant interval change.      DX-CHEST-LIMITED (1 VIEW)   Final Result         Diffuse interstitial prominence could relate to mild pulmonary edema or atypical infection      [id number] VIEW   Final Result      Increased colonic gas without definite bowel obstruction.      US-RENAL   Final Result      No evidence of hydronephrosis.      Lobulated kidneys bilaterally.     "  CT-ABDOMEN-PELVIS WITH   Final Result      1.  There is no evidence of small bowel obstruction.   2.  There is a left lower quadrant ostomy.   3.  There is fluid distention of the colon distal to the surgical material in the left mid descending colon extending all the way to the rectum. There is no pneumatosis or free air.   4.  There is cholelithiasis without biliary dilatation.   5.  There has been interval removal of the drainage catheter anterior to the spleen with minimal hypodense area in the anterior spleen again noted. There is no new fluid collection.      IR-US GUIDED PIV   Final Result    Ultrasound-guided PERIPHERAL IV INSERTION performed by    qualified nursing staff as above.      EC-ECHOCARDIOGRAM COMPLETE W/O CONT   Final Result      DX-LUMBAR SPINE-2 OR 3 VIEWS   Final Result      Moderate compression deformity of T11 is new compared to 2018.      Mild wedge deformity of T12 is unchanged.      Degenerative changes including facet arthropathy.      Mild retrolisthesis of L5 on S1 and L3 on L4.            Assessment/Plan  * Suicidal ideation- (present on admission)  Assessment & Plan  -Has history of MDD, recurrent with psychotic features. active SI w/ plan on admission  -Psychiatry following, recommended discontinuation of civil commitment - suspect possible malingering or secondary gain   - Evaluated bedside again and discussed with patient who states he \"wants to live\"   -Psych recommending sitter to ensure patient does not attempt actions that would extend his stay     Orthostatic hypotension- (present on admission)  Assessment & Plan  -Multifactorial including adrenal insufficiency, medications (beta blocker, CCB, tamsulosin), diabetic autonomic dysfunction, and venous insufficiency.  -EF is noted to be 60%, significant improvement from prior (previously 35%).  -Per cardiology, no further cardiac work-up needed.  Recommends follow-up with them as outpatient.  -Fall precautions.  -Continue to " educate on the importance of slow positional changes.   - Started PO hydrocortisone 4/7/2021 for AI. Continuing florinef.   -Decreased toprolol which allowed for discontinuation of midodrine  Improved     Paroxysmal atrial fibrillation (HCC)- (present on admission)  Assessment & Plan  -CHADSVASC 3 (HTN, CHF, DM).   -TTE Feb 2021: EF 60%.  -Continue metoprolol, digoxin, and eliquis.    Chronic heart failure with preserved ejection fraction (HCC)- (present on admission)  Assessment & Plan  -Now stable.  -Pulmonary edema noted in mid-March, was placed on IV diuresis though that was held when he developed a gram-negative gina bacteremia and sepsis.  Currently doing well from a respiratory standpoint, no shortness of breath, lungs clear to auscultation bilaterally, not requiring oxygen supplementation.  -Repeat echocardiogram showed improvement in his ejection fraction to 60%.    -Continue metoprolol succinate.  -Lisinopril held per cardiology recommendations due to hypotension.    Diabetes mellitus type 2, insulin dependent (HCC)- (present on admission)  Assessment & Plan  -A1c 5.2% on 2/14/2021.  -Continue diabetic diet.  -Continue lantus at current dose   -Hypoglycemia protocol in place if needed.      Major depressive disorder, recurrent, severe with psychotic features (HCC)- (present on admission)  Assessment & Plan  -Management as listed under 'anxiety and depression'.    Anxiety and depression- (present on admission)  Assessment & Plan  -Legal hold extended, awaiting acceptance at psych facility ([facility] will take in May).  -Remains expressing suicidal ideations but actions contradict.   -Psychiatry signed off feel he is malingering   -Continue paxil, klonopin, & geodon.     Hypothyroidism- (present on admission)  Assessment & Plan  -TFTs in Feb were WNL. Continue levothyroxine at current dose.     Adrenal insufficiency (HCC)- (present on admission)  Assessment & Plan  -Started PO hydrocortisone 4/7/2021. Continue  florinef.   -Needs outpatient endocrinology follow up.  -Monitor potassium while on florinef. 4.4 on 4/7/2021.  -Repeat orthostatics periodically if symptomatic         Obstructive uropathy- (present on admission)  Assessment & Plan  -Trial off of flomax given his hypotension.  Discontinued 4/6/2021. No retention continue off Flomax    Chronic venous insufficiency of lower extremity- (present on admission)  Assessment & Plan  -Continue compression stockings.    Colostomy present (HCC)- (present on admission)  Assessment & Plan  -History of fall at home Nov 2020 on left side with perforated colon and splenic fluid collection/hematoma s/p segmental colectomy, colostomy, mobilization of the splenic flexure, wound vac, and I&D Nov 2020.  -Wound care consulted for ostomy care, but he was non-participatory. He is now engaging in ostomy care with his bedside RNs, encouraged to be observant but do it independently.  -Patient must be independently able to care for his ostomy before inpatient psychiatry will accept him.  Per surgery Dr. Desouza, ostomy reversal is high risk given patient's complexities. Surgery recommending ostomy reversal as an outpatient    Debility- (present on admission)  Assessment & Plan  -Cleared by PT/OT.  -Out of bed for each meal and ambulate in the hallways at least twice per shift.    Mixed hyperlipidemia- (present on admission)  Assessment & Plan  Lab Results   Component Value Date/Time    CHOLSTRLTOT 146 02/14/2021 01:30 AM    LDL 82 02/14/2021 01:30 AM    HDL 32 (A) 02/14/2021 01:30 AM    TRIGLYCERIDE 160 (H) 02/14/2021 01:30 AM   -Continue atorvastatin.     Lower limb amputation, great toe (HCC)- (present on admission)  Assessment & Plan  -History of, 2018.  Site well-healed.    Class 2 severe obesity with serious comorbidity and body mass index (BMI) of 35.0 to 35.9 in adult (HCC)- (present on admission)  Assessment & Plan  Body mass index is 35.19 kg/m².-Trending down.  -Could benefit from  outpatient weight loss counseling which can be arranged through his PCP after hospital discharge.       VTE prophylaxis: Eliquis

## 2021-05-04 NOTE — PROGRESS NOTES
"Got call from telesitter saying patient was looking at his TV remote cord suspiciously, this RN checked in on patient who states to be having \"bad thoughts\", asked to clarify and patient stated he was thinking of hurting himself. This RN asked if he had any intention to hurt himself and a plan and patient denies both. This RN attempted to socialize to patient, grabbed some ice cream and coffee to pep the patient up. Notified Dr. Fernandez from psych.   "

## 2021-05-05 ENCOUNTER — APPOINTMENT (OUTPATIENT)
Dept: CARDIOLOGY | Facility: MEDICAL CENTER | Age: 56
End: 2021-05-05
Payer: MEDICARE

## 2021-05-05 LAB — GLUCOSE BLD-MCNC: 106 MG/DL (ref 65–99)

## 2021-05-05 PROCEDURE — A9270 NON-COVERED ITEM OR SERVICE: HCPCS | Performed by: STUDENT IN AN ORGANIZED HEALTH CARE EDUCATION/TRAINING PROGRAM

## 2021-05-05 PROCEDURE — 700102 HCHG RX REV CODE 250 W/ 637 OVERRIDE(OP): Performed by: HOSPITALIST

## 2021-05-05 PROCEDURE — 700102 HCHG RX REV CODE 250 W/ 637 OVERRIDE(OP): Performed by: NURSE PRACTITIONER

## 2021-05-05 PROCEDURE — 99232 SBSQ HOSP IP/OBS MODERATE 35: CPT | Performed by: STUDENT IN AN ORGANIZED HEALTH CARE EDUCATION/TRAINING PROGRAM

## 2021-05-05 PROCEDURE — 700102 HCHG RX REV CODE 250 W/ 637 OVERRIDE(OP): Performed by: STUDENT IN AN ORGANIZED HEALTH CARE EDUCATION/TRAINING PROGRAM

## 2021-05-05 PROCEDURE — 770001 HCHG ROOM/CARE - MED/SURG/GYN PRIV*

## 2021-05-05 PROCEDURE — A9270 NON-COVERED ITEM OR SERVICE: HCPCS | Performed by: NURSE PRACTITIONER

## 2021-05-05 PROCEDURE — 82962 GLUCOSE BLOOD TEST: CPT

## 2021-05-05 PROCEDURE — A9270 NON-COVERED ITEM OR SERVICE: HCPCS | Performed by: HOSPITALIST

## 2021-05-05 PROCEDURE — 302098 PASTE RING (FLAT): Performed by: STUDENT IN AN ORGANIZED HEALTH CARE EDUCATION/TRAINING PROGRAM

## 2021-05-05 RX ADMIN — LEVOTHYROXINE SODIUM 112 MCG: 0.11 TABLET ORAL at 05:06

## 2021-05-05 RX ADMIN — APIXABAN 5 MG: 5 TABLET, FILM COATED ORAL at 09:01

## 2021-05-05 RX ADMIN — METOPROLOL SUCCINATE 12.5 MG: 25 TABLET, EXTENDED RELEASE ORAL at 05:06

## 2021-05-05 RX ADMIN — DIGOXIN 125 MCG: 125 TABLET ORAL at 17:48

## 2021-05-05 RX ADMIN — FLUDROCORTISONE ACETATE 0.1 MG: 0.1 TABLET ORAL at 05:06

## 2021-05-05 RX ADMIN — INSULIN GLARGINE 5 UNITS: 100 INJECTION, SOLUTION SUBCUTANEOUS at 05:09

## 2021-05-05 RX ADMIN — SIMETHICONE 80 MG: 80 TABLET, CHEWABLE ORAL at 05:06

## 2021-05-05 RX ADMIN — APIXABAN 5 MG: 5 TABLET, FILM COATED ORAL at 20:48

## 2021-05-05 RX ADMIN — SIMETHICONE 80 MG: 80 TABLET, CHEWABLE ORAL at 12:17

## 2021-05-05 RX ADMIN — SIMETHICONE 80 MG: 80 TABLET, CHEWABLE ORAL at 17:48

## 2021-05-05 RX ADMIN — HYDROCORTISONE 5 MG: 10 TABLET ORAL at 17:53

## 2021-05-05 RX ADMIN — HYDROCORTISONE 10 MG: 10 TABLET ORAL at 05:06

## 2021-05-05 RX ADMIN — PAROXETINE HYDROCHLORIDE 20 MG: 20 TABLET, FILM COATED ORAL at 09:01

## 2021-05-05 RX ADMIN — CLONAZEPAM 1 MG: 1 TABLET ORAL at 17:48

## 2021-05-05 RX ADMIN — ZIPRASIDONE HYDROCHLORIDE 60 MG: 60 CAPSULE ORAL at 20:48

## 2021-05-05 RX ADMIN — POTASSIUM CHLORIDE 20 MEQ: 20 TABLET, EXTENDED RELEASE ORAL at 05:06

## 2021-05-05 RX ADMIN — ATORVASTATIN CALCIUM 40 MG: 40 TABLET, FILM COATED ORAL at 17:48

## 2021-05-05 RX ADMIN — ZIPRASIDONE HYDROCHLORIDE 60 MG: 60 CAPSULE ORAL at 09:03

## 2021-05-05 RX ADMIN — CLONAZEPAM 0.5 MG: 0.5 TABLET ORAL at 09:01

## 2021-05-05 ASSESSMENT — ENCOUNTER SYMPTOMS
SHORTNESS OF BREATH: 0
NAUSEA: 0
ABDOMINAL PAIN: 0
DIARRHEA: 0
COUGH: 0
WEAKNESS: 0
ROS GI COMMENTS: COLOSTOMY IN PLACE
FEVER: 0
DEPRESSION: 0
CONSTIPATION: 0
VOMITING: 0

## 2021-05-05 ASSESSMENT — PAIN DESCRIPTION - PAIN TYPE: TYPE: ACUTE PAIN

## 2021-05-05 NOTE — PROGRESS NOTES
Hospital Medicine Twice Weekly Progress Note    Date of Service  5/5/2021      Chief Complaint  Suicidal ideation    Hospital Course  Mr. Castro is a 56 year-old man with a past medical history significant for atrial fibrillation, secondary hypercoagulable state, diabetes mellitus complicated by diabetic neuropathy, chronic systolic CHF, colostomy placement, orthostatic hypotension (maintained on florinef), and dyslipidemia who presented to the ED on 2/9/2021 after he became dizzy and had a syncopal episode at home.  In the ED he was noted to be hypotensive and bradycardic. Cardiology was consulted and recommended stopping all AV sanket blocking medications.  He remained orthostatic during his hospitalization so midodrine was started.  An echocardiogram was obtained and showed mild mitral regurgitation and an EF of 60% which was improved from his previous echocardiogram obtained on 11/2020 (previously 35%).  CTAs of the head and neck were done and were unremarkable outside of distal bilateral ICA tortuosity.  Also during his hospitalization he expressed desire to kill himself.  He has a colostomy and has become overwhelmed by caring for it.  He was subsequently placed on a legal hold and psychiatry was consulted.  He is now pending [facility] placement at the 6-month db for maturity of his colostomy.    Interval Problem Update  4/20- Patient's civil commitment was discontinued this morning.  Received message from nursing that patient again stating suicidal thoughts to staff.  On discussion with patient he states he is not suicidal at this time and has no intention of hurting himself, that he is just depressed related to his colostomy and having trouble dealing with his medical issues.  Patient reassured provider that he is not suicidal at this time.  Patient encouraged to focus on positive thoughts.  Nursing contacted psychiatry who state legal hold is not necessary at this time.   4/21- Patient again stating he is  "having suicidal ideations and expressed to nursing that he has a plan to slit his throat. Discussed patient with Psychiatry and agree with her assessment that patient makes statements but his actions are not coinciding with his expressions.  Went to bedside and discussed with patient his comments.  Patient directly stated \" I am having those suicidal ideations again\".  Asked patient point blank if he wanted to live at which he stated \" yes I want to live\".  Talked at length with patient regarding possible thoughts that would enter his head and ability to focus on positive aspects.  At end of conversation patient states he had no intention of committing suicide and will focus on getting well.  Questionable secondary gain as motive for daily expression of suicidal ideation.   4/24- Patient resting in bed, states he continues to have suicidal thoughts because he is upset at having a colostomy, but then states he wants to live and get better. PT  Re-evaluated patient and feel he is at baseline and ok to return to group home. Will discuss with case management on availability to return to former living facility. Discontinued midodrine, blood pressure remaining elevated with lower dose metoprolol.   4/27: Patient was placed back on legal hold.  Legal hold paperwork is filled out and psych facility transfer is pending.  4/28: Psych facility transfer is pending.  I will discuss with surgery whether to reverse his colostomy bag is a possibility as inpatient.  4/29: No acute issues per patient today.  We will continue to evaluate for psych facility.  4/30 seconds extended legal hold.  Continue to find psych facility for placement.  This may take a while since he has a colostomy bag and the facility will take him after 6 months since has been placed.  5/1: No acute issues today  5/2: No acute issues today and will continue to wait for placement.  5/3: Legal hold discontinued by psychiatry as they believe it was due to secondary " gain rather than suicide intent.  We will find placement for this patient now.  5/4: Patient has no complaints when I examined the patient.  Later on patient did mention suicide without intent or a plan to the nursing staff it appears that he does use this as a form of malingering when he wants attention.  I did discuss with the patient that we will try to find a group home form.  Blood sugar well controlled glucose 117.  Continue current insulin regimen  T-max 97.9, /89.  5/5: Patient again endorsed to nursing staff that he had suicidal ideations.  However when I meet with the patient he reports that he really does not have suicidal ideations.  I asked him why he had made that comment he reports that he had been feeling lonely and wanted to have attention from the staff.  I discussed with him that if he is feeling lonely and would like to have someone stop by to say hi he just needs to let the staff know instead of reporting suicidal ideations, unless he really is having suicidal ideations.  Patient adamantly denies that he is having suicidal ideations.  Continuing to work with patient to find a group home.  Nursing is working with the patient so that he can change his ostomy on his own.  Glucose is 106.  T-max 97, /55    Consultants/Specialty  Cardiology  Psychiatry  General surgery    Code Status  Full Code    Disposition  TBD- Civil commitment discontinued-working on discharge with case management    Review of Systems  Review of Systems   Constitutional: Negative for fever and malaise/fatigue.   Respiratory: Negative for cough and shortness of breath.    Cardiovascular: Negative for chest pain and leg swelling.   Gastrointestinal: Negative for abdominal pain, constipation, diarrhea, nausea and vomiting.        Colostomy in place    Neurological: Negative for weakness.   Psychiatric/Behavioral: Negative for depression and suicidal ideas.   All other systems reviewed and are negative.     Physical  Exam  Temp:  [35.9 °C (96.7 °F)-36.1 °C (97 °F)] 36.1 °C (97 °F)  Pulse:  [] 78  Resp:  [17-20] 18  BP: (105-124)/(55-74) 105/55  SpO2:  [94 %-96 %] 94 %    Physical Exam  Vitals and nursing note reviewed.   Constitutional:       General: He is awake. He is not in acute distress.     Appearance: Normal appearance. He is well-developed. He is not ill-appearing.   HENT:      Head: Normocephalic and atraumatic.      Mouth/Throat:      Lips: Pink.      Mouth: Mucous membranes are moist.   Eyes:      General: Lids are normal.      Conjunctiva/sclera: Conjunctivae normal.      Pupils: Pupils are equal, round, and reactive to light.   Cardiovascular:      Rate and Rhythm: Normal rate and regular rhythm.      Pulses: Normal pulses.      Heart sounds: Normal heart sounds.   Pulmonary:      Effort: Pulmonary effort is normal. No respiratory distress.      Breath sounds: Normal breath sounds. No decreased breath sounds or wheezing.   Abdominal:      General: Bowel sounds are normal. There is no distension.      Palpations: Abdomen is soft.      Tenderness: There is no abdominal tenderness. There is no guarding.       Musculoskeletal:      Cervical back: Neck supple.      Right lower leg: No edema.      Left lower leg: No edema.   Skin:     General: Skin is warm and dry.      Coloration: Skin is pale.   Neurological:      General: No focal deficit present.      Mental Status: He is alert and oriented to person, place, and time.      Cranial Nerves: No cranial nerve deficit.   Psychiatric:         Attention and Perception: Attention and perception normal.         Mood and Affect: Affect normal. Mood is depressed.         Speech: Speech normal.         Behavior: Behavior normal. Behavior is cooperative.         Thought Content: Thought content does not include suicidal ideation. Thought content does not include suicidal plan.         Cognition and Memory: Memory normal.      Comments: Patient states depressed secondary to  colostomy and further need for it, stated to provider that he has no intention of harming himself and is not suicidal at this time.          Fluids    Intake/Output Summary (Last 24 hours) at 5/5/2021 1203  Last data filed at 5/5/2021 0020  Gross per 24 hour   Intake --   Output 325 ml   Net -325 ml     Laboratory          Imaging  [id number] VIEW   Final Result         No significant interval change.      DX-CHEST-LIMITED (1 VIEW)   Final Result         Diffuse interstitial prominence could relate to mild pulmonary edema or atypical infection      [id number] VIEW   Final Result      Increased colonic gas without definite bowel obstruction.      US-RENAL   Final Result      No evidence of hydronephrosis.      Lobulated kidneys bilaterally.      CT-ABDOMEN-PELVIS WITH   Final Result      1.  There is no evidence of small bowel obstruction.   2.  There is a left lower quadrant ostomy.   3.  There is fluid distention of the colon distal to the surgical material in the left mid descending colon extending all the way to the rectum. There is no pneumatosis or free air.   4.  There is cholelithiasis without biliary dilatation.   5.  There has been interval removal of the drainage catheter anterior to the spleen with minimal hypodense area in the anterior spleen again noted. There is no new fluid collection.      IR-US GUIDED PIV   Final Result    Ultrasound-guided PERIPHERAL IV INSERTION performed by    qualified nursing staff as above.      EC-ECHOCARDIOGRAM COMPLETE W/O CONT   Final Result      DX-LUMBAR SPINE-2 OR 3 VIEWS   Final Result      Moderate compression deformity of T11 is new compared to 2018.      Mild wedge deformity of T12 is unchanged.      Degenerative changes including facet arthropathy.      Mild retrolisthesis of L5 on S1 and L3 on L4.            Assessment/Plan  * Suicidal ideation- (present on admission)  Assessment & Plan  -Has history of MDD, recurrent with psychotic features. active SI w/ plan  "on admission  -Psychiatry following, recommended discontinuation of civil commitment - suspect possible malingering or secondary gain   - Evaluated bedside again and discussed with patient who states he \"wants to live\"   -Psych recommending sitter to ensure patient does not attempt actions that would extend his stay   5/5: Patient reports that he is telling staff that his suicidal ideations because he would like attention and not because he is actually having suicidal ideations.    Orthostatic hypotension- (present on admission)  Assessment & Plan  -Multifactorial including adrenal insufficiency, medications (beta blocker, CCB, tamsulosin), diabetic autonomic dysfunction, and venous insufficiency.  -EF is noted to be 60%, significant improvement from prior (previously 35%).  -Per cardiology, no further cardiac work-up needed.  Recommends follow-up with them as outpatient.  -Fall precautions.  -Continue to educate on the importance of slow positional changes.   - Started PO hydrocortisone 4/7/2021 for AI. Continuing florinef.   -Decreased toprolol which allowed for discontinuation of midodrine  Improved     Paroxysmal atrial fibrillation (HCC)- (present on admission)  Assessment & Plan  -CHADSVASC 3 (HTN, CHF, DM).   -TTE Feb 2021: EF 60%.  -Continue metoprolol, digoxin, and eliquis.    Chronic heart failure with preserved ejection fraction (HCC)- (present on admission)  Assessment & Plan  -Now stable.  -Pulmonary edema noted in mid-March, was placed on IV diuresis though that was held when he developed a gram-negative gina bacteremia and sepsis.  Currently doing well from a respiratory standpoint, no shortness of breath, lungs clear to auscultation bilaterally, not requiring oxygen supplementation.  -Repeat echocardiogram showed improvement in his ejection fraction to 60%.    -Continue metoprolol succinate.  -Lisinopril held per cardiology recommendations due to hypotension.    Diabetes mellitus type 2, insulin " dependent (HCC)- (present on admission)  Assessment & Plan  -A1c 5.2% on 2/14/2021.  -Continue diabetic diet.  -Continue lantus at current dose   -Hypoglycemia protocol in place if needed.      Major depressive disorder, recurrent, severe with psychotic features (HCC)- (present on admission)  Assessment & Plan  -Management as listed under 'anxiety and depression'.    Anxiety and depression- (present on admission)  Assessment & Plan  -Legal hold extended, awaiting acceptance at psych facility ([facility] will take in May).  -Remains expressing suicidal ideations but actions contradict.   -Psychiatry signed off feel he is malingering   -Continue paxil, klonopin, & geodon.     Hypothyroidism- (present on admission)  Assessment & Plan  -TFTs in Feb were WNL. Continue levothyroxine at current dose.     Adrenal insufficiency (HCC)- (present on admission)  Assessment & Plan  -Started PO hydrocortisone 4/7/2021. Continue florinef.   -Needs outpatient endocrinology follow up.  -Monitor potassium while on florinef. 4.4 on 4/7/2021.  -Repeat orthostatics periodically if symptomatic         Obstructive uropathy- (present on admission)  Assessment & Plan  -Trial off of flomax given his hypotension.  Discontinued 4/6/2021. No retention continue off Flomax    Chronic venous insufficiency of lower extremity- (present on admission)  Assessment & Plan  -Continue compression stockings.    Colostomy present (HCC)- (present on admission)  Assessment & Plan  -History of fall at home Nov 2020 on left side with perforated colon and splenic fluid collection/hematoma s/p segmental colectomy, colostomy, mobilization of the splenic flexure, wound vac, and I&D Nov 2020.  -Wound care consulted for ostomy care, but he was non-participatory. He is now engaging in ostomy care with his bedside RNs, encouraged to be observant but do it independently.  -Patient must be independently able to care for his ostomy before inpatient psychiatry will accept  him.  Per surgery Dr. Desouza, ostomy reversal is high risk given patient's complexities. Surgery recommending ostomy reversal as an outpatient    Debility- (present on admission)  Assessment & Plan  -Cleared by PT/OT.  -Out of bed for each meal and ambulate in the hallways at least twice per shift.    Mixed hyperlipidemia- (present on admission)  Assessment & Plan  Lab Results   Component Value Date/Time    CHOLSTRLTOT 146 02/14/2021 01:30 AM    LDL 82 02/14/2021 01:30 AM    HDL 32 (A) 02/14/2021 01:30 AM    TRIGLYCERIDE 160 (H) 02/14/2021 01:30 AM   -Continue atorvastatin.     Lower limb amputation, great toe (HCC)- (present on admission)  Assessment & Plan  -History of, 2018.  Site well-healed.    Class 2 severe obesity with serious comorbidity and body mass index (BMI) of 35.0 to 35.9 in adult (HCC)- (present on admission)  Assessment & Plan  Body mass index is 35.19 kg/m².-Trending down.  -Could benefit from outpatient weight loss counseling which can be arranged through his PCP after hospital discharge.       VTE prophylaxis: Eliquis    I have performed a physical exam and reviewed and updated ROS and Plan today (5/5/2021). In review of yesterday's note (5/4/2021), there are no changes except as documented above.

## 2021-05-05 NOTE — DISCHARGE PLANNING
Anticipated Discharge Disposition:      Action: Discussed in IDT rounds; pt is pending placement in group home.    CM spoke with pt at bedside. Pt updated on discharge plan; states he is agreeable with [facility] placement. CM verified pt is able to manage his own ostomy.     CM placed call to his rep payee ([employer] [phone]); VM left with contact info for return call.    Barriers to Discharge:  payor source pending    Plan: Care coordination will continue to reach rep payee

## 2021-05-05 NOTE — CARE PLAN
Problem: Communication  Goal: The ability to communicate needs accurately and effectively will improve  Outcome: PROGRESSING AS EXPECTED     Call light appropriate. Verbalizes understanding to communicate negative thoughts to staff.    Problem: Psychosocial Needs:  Goal: Level of anxiety will decrease  Outcome: PROGRESSING AS EXPECTED     Slept comfortably throughout shift.    Problem: Pain Management  Goal: Pain level will decrease to patient's comfort goal  Outcome: PROGRESSING AS EXPECTED     Denies Pain.    Problem: Potential for Self Harm  Goal: No active self-harm  Outcome: PROGRESSING AS EXPECTED     Free of injury during shift. Tele sitter available.    Problem: Ineffective Coping  Goal: No active self-harm  Outcome: PROGRESSING AS EXPECTED

## 2021-05-05 NOTE — WOUND TEAM
"  [facility]   Inpatient Services   Established Ostomy Management/ troubleshooting  HPI: Reviewed  PMH: Reviewed   SH: Reviewed   Reason for Ostomy nurse consult:  Established colostomy        Objective: appliance intact. Tele sitter at bedside. Patient excited for wound team to watch him change appliance      Colostomy 11/10/20 Transverse LUQ (Active)   Wound Image   04/02/21 2300   Stomal Appliance Assessment Changed    Stoma Assessment Pink    Stoma Shape Oval;Budded Less Than One Inch    Stoma Size (in) 1.25    Peristomal Assessment Intact    Mucocutaneous Junction Intact    Treatment Appliance Changed;Cleansed with water/washcloth;Site care    Peristomal Protectant Paste Ring    Stomal Appliance 2 1/4\" (57mm) CTF;Paste Ring, 2\"    Output (mL) 100 mL    Output Color Brown    WOUND RN ONLY - Stomal Appliance  2 Piece;Paste Ring, 2\";2 1/4\" Moldable    Appliance Brand Koibanx    Ostomy Care Resources Provided [facility] Tip Sheet              Ostomy Appliance (type and size): 2 1/4\" Formaflex barrier with pouch and Paste Ring,      Interventions and Education: Patient completed entire change himself with verbal prompts from this wound RN to remind him of whats next. Patient removed appliance. Pt cleansed peristomal skin and stoma with moist warm wash cloth. Patient applied stoma powder, dusted, then applied no sting skin prep. Pt did stretched barrier to fit. Pt applied paste ring to peristoma around stoma. Then placed barrier in place. Pt then attached pouch to barrier, ostomy RN assisted with fully attaching pouch. Pt then closed pouch end.    **would be helpful for patient if he attached back to barrier prior to placing it on abdomen.*    Evaluation:   5/5/21: Patient in pleasant mood today and very willing to do entire change himself. Needed some verbal prompts and reminders but otherwise patient completed entire change himself. Will attempt to see patient again in a few days so he can get more " comfortable doing entire hands on himself.     4/25: Pt very down today. Pt able to do some steps but needs help from RN still. Pt requested that MD reconsiders takedown after pt attempted to wrap light cord around his neck. Updated bedside RN to discuss with MD.     4/21: Pt continuing with hands on care. Expressed some difficulty attaching pouch, however, performs well with formaflex barrier.    4/15: used formaflex (moldable) barrier today which pt prefers to use. Pt stated he needs another education session because he needs more practice. Ostomy RN will continue to meet with pt.    4/7/21:  Pt reports he has been burping pouch and is capable of changing appliance. Pt does have difficulty cutting barrier. May be able to transition to moldable at next change. Pt appears in better spirits today. Ostomy RN to remain available for education.     03/31/21: patient capable of changing ostomy but still having emotional difficulty with it.  Wound team will continue to follow 1-2 times weekly.  Patient not appropriate for surgical reversal at this time.         3/15/21:  Pt being transferred to T8 related to bacteremia and allergic reaction to zosyn overnight. Appliance working appropriately. Skin intact. Will plan to sign off and be available PRN.     3/11/21: Patient formed own formaflex barrier pouch and paste ring, assisted in cleansing. Feeling better and willing to continue to get more hands on. Likes the formaflex because he is actually able to form it to his size. Still has not performed independently.      3/8/21: Minimal participation, he attempts to do the steps with his eyes closed and minimal effort with his hands. Not sure if he lacks the fine motor movement or just going through the motions due to his expressed hatred of the colostomy. Patient has yet to perform all steps independently.     3/5/21: Pt did not participate in education today. Pt answered questions appropriately, but remained with his eyes  closed through the duration of ostomy care. Declined hands on education when offered. Reported he was not feeling good. Formaflex barrier brought in today for pt as these do no require cutting with scissors. Extra barrier left in bag. CT of abd obtained yesterday did not reveal obstruction.     3/1/21: Viable stoma, producing stool. Per CNA report at bedside, appliance was leaking earlier and CNA changed appliance. Patient observed all steps today and participated with hands on care and did well. Discussed with MD on possibility for reversal. Per surgery, reversal of ostomy is an outpatient procedure that he will have to follow-up with in the future after discharge     2/26/21: Pt very down today, reports that psych has not seen him. Pt kept turning head away from ostomy today but did verbalized he will try to do hands on Sunday.      2/24/21: Pt unable to transfer to inpatient psych until he can care for colostomy independently. Ostomy RNs were asked to return for education. Pt extremely depressed today. Pt states he feels like an animal and that the colostomy is messy and smelly and he would rather die then care for it. This RN provided understanding and attempted to explain that colostomy is manageable. 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 Pt had colostomy created on November 10, 2020 by Dr. Desouza. May want to consider contacting surgery for possible reversal related to pts extreme body dysmorphia (this is pts 2nd admit for SI related to colostomy) and inability for pt to DC due to pts inability/unwillingness to care for colostomy - Discussed with Dr. Ley ([facility] Resident).      Plan: Wound team to continue with teaching every 3-4 days to help teach patient how to care for appliance so that he can discharge  to psych.     Anticipated discharge needs: patient would benefit from SNF, outpatient clinic or Home health for follow up care. Patient needs to follow up with Dr. Desouza after discharge to plan for ostomy reversal surgery.

## 2021-05-05 NOTE — FLOWSHEET NOTE
"Entered patient room to assess patient. Patient stated he is \"feeling suicidal\". Patient denies current plan of harming himself. Patient appears calm and cooperative. Emotional support provided. Socialized with patient. Repositioned patient in bed, patient stated he \"feels better\".  MD and charge nurse made aware. Suicidal precautions currently in place. Tele sitter in place for continuous safety monitoring. Frequent safety checks performed. Call light within reach.   "

## 2021-05-06 LAB — GLUCOSE BLD-MCNC: 113 MG/DL (ref 65–99)

## 2021-05-06 PROCEDURE — 700102 HCHG RX REV CODE 250 W/ 637 OVERRIDE(OP): Performed by: NURSE PRACTITIONER

## 2021-05-06 PROCEDURE — A9270 NON-COVERED ITEM OR SERVICE: HCPCS | Performed by: NURSE PRACTITIONER

## 2021-05-06 PROCEDURE — A9270 NON-COVERED ITEM OR SERVICE: HCPCS | Performed by: STUDENT IN AN ORGANIZED HEALTH CARE EDUCATION/TRAINING PROGRAM

## 2021-05-06 PROCEDURE — 700102 HCHG RX REV CODE 250 W/ 637 OVERRIDE(OP): Performed by: STUDENT IN AN ORGANIZED HEALTH CARE EDUCATION/TRAINING PROGRAM

## 2021-05-06 PROCEDURE — 82962 GLUCOSE BLOOD TEST: CPT

## 2021-05-06 PROCEDURE — 700102 HCHG RX REV CODE 250 W/ 637 OVERRIDE(OP): Performed by: HOSPITALIST

## 2021-05-06 PROCEDURE — A9270 NON-COVERED ITEM OR SERVICE: HCPCS | Performed by: HOSPITALIST

## 2021-05-06 PROCEDURE — 99231 SBSQ HOSP IP/OBS SF/LOW 25: CPT | Performed by: STUDENT IN AN ORGANIZED HEALTH CARE EDUCATION/TRAINING PROGRAM

## 2021-05-06 PROCEDURE — 770001 HCHG ROOM/CARE - MED/SURG/GYN PRIV*

## 2021-05-06 RX ADMIN — CLONAZEPAM 1 MG: 1 TABLET ORAL at 16:07

## 2021-05-06 RX ADMIN — APIXABAN 5 MG: 5 TABLET, FILM COATED ORAL at 20:38

## 2021-05-06 RX ADMIN — SIMETHICONE 80 MG: 80 TABLET, CHEWABLE ORAL at 04:41

## 2021-05-06 RX ADMIN — PAROXETINE HYDROCHLORIDE 20 MG: 20 TABLET, FILM COATED ORAL at 08:26

## 2021-05-06 RX ADMIN — HYDROCORTISONE 10 MG: 10 TABLET ORAL at 04:43

## 2021-05-06 RX ADMIN — DIGOXIN 125 MCG: 125 TABLET ORAL at 16:06

## 2021-05-06 RX ADMIN — POTASSIUM CHLORIDE 20 MEQ: 20 TABLET, EXTENDED RELEASE ORAL at 04:42

## 2021-05-06 RX ADMIN — INSULIN GLARGINE 5 UNITS: 100 INJECTION, SOLUTION SUBCUTANEOUS at 04:54

## 2021-05-06 RX ADMIN — ZIPRASIDONE HYDROCHLORIDE 60 MG: 60 CAPSULE ORAL at 08:26

## 2021-05-06 RX ADMIN — SIMETHICONE 80 MG: 80 TABLET, CHEWABLE ORAL at 16:06

## 2021-05-06 RX ADMIN — FLUDROCORTISONE ACETATE 0.1 MG: 0.1 TABLET ORAL at 08:26

## 2021-05-06 RX ADMIN — ATORVASTATIN CALCIUM 40 MG: 40 TABLET, FILM COATED ORAL at 16:06

## 2021-05-06 RX ADMIN — DOCUSATE SODIUM 50 MG AND SENNOSIDES 8.6 MG 2 TABLET: 8.6; 5 TABLET, FILM COATED ORAL at 04:42

## 2021-05-06 RX ADMIN — HYDROCORTISONE 5 MG: 10 TABLET ORAL at 16:06

## 2021-05-06 RX ADMIN — SIMETHICONE 80 MG: 80 TABLET, CHEWABLE ORAL at 11:53

## 2021-05-06 RX ADMIN — CLONAZEPAM 0.5 MG: 0.5 TABLET ORAL at 08:26

## 2021-05-06 RX ADMIN — ZIPRASIDONE HYDROCHLORIDE 60 MG: 60 CAPSULE ORAL at 20:37

## 2021-05-06 RX ADMIN — LEVOTHYROXINE SODIUM 112 MCG: 0.11 TABLET ORAL at 04:41

## 2021-05-06 RX ADMIN — METOPROLOL SUCCINATE 12.5 MG: 25 TABLET, EXTENDED RELEASE ORAL at 04:41

## 2021-05-06 RX ADMIN — APIXABAN 5 MG: 5 TABLET, FILM COATED ORAL at 08:26

## 2021-05-06 ASSESSMENT — ENCOUNTER SYMPTOMS
NAUSEA: 0
VOMITING: 0
COUGH: 0
WEAKNESS: 0
DEPRESSION: 0
DIARRHEA: 0
FEVER: 0
ROS GI COMMENTS: COLOSTOMY IN PLACE
CONSTIPATION: 0
ABDOMINAL PAIN: 0
SHORTNESS OF BREATH: 0

## 2021-05-06 ASSESSMENT — PAIN DESCRIPTION - PAIN TYPE: TYPE: ACUTE PAIN

## 2021-05-06 NOTE — DISCHARGE PLANNING
Anticipated Discharge Disposition: Group home    Action: Called rep conchita Raman [phone]- no answer.  SW was able to locate rep payee's phone number in prior notes, [employer] [phone]. Called and spoke with Karol. She states they terminated rep payee services for pt in January. They were under the impression that pt was going to stay at SNF as a long term resident and reported that to [facility]. She states they are no longer accepting non local patients and would no be able to accept pt back.     Met with pt at bedside. Pt was not aware that rep payee services were terminated. Pt doesn't know where his checks are going or if he has access to any of his money. Pt states he would like to establish with a new rep payee locally. He makes about $1300/ month.     Barriers to Discharge: Placement    Plan: Care coordination will continue to follow and assist with discharge planning

## 2021-05-06 NOTE — PROGRESS NOTES
Hospital Medicine Twice Weekly Progress Note    Date of Service  5/6/2021      Chief Complaint  Suicidal ideation    Hospital Course  Mr. Castro is a 56 year-old man with a past medical history significant for atrial fibrillation, secondary hypercoagulable state, diabetes mellitus complicated by diabetic neuropathy, chronic systolic CHF, colostomy placement, orthostatic hypotension (maintained on florinef), and dyslipidemia who presented to the ED on 2/9/2021 after he became dizzy and had a syncopal episode at home.  In the ED he was noted to be hypotensive and bradycardic. Cardiology was consulted and recommended stopping all AV sanket blocking medications.  He remained orthostatic during his hospitalization so midodrine was started.  An echocardiogram was obtained and showed mild mitral regurgitation and an EF of 60% which was improved from his previous echocardiogram obtained on 11/2020 (previously 35%).  CTAs of the head and neck were done and were unremarkable outside of distal bilateral ICA tortuosity.  Also during his hospitalization he expressed desire to kill himself.  He has a colostomy and has become overwhelmed by caring for it.  He was subsequently placed on a legal hold and psychiatry was consulted.  He is now pending [facility] placement at the 6-month db for maturity of his colostomy.    Interval Problem Update  4/20- Patient's civil commitment was discontinued this morning.  Received message from nursing that patient again stating suicidal thoughts to staff.  On discussion with patient he states he is not suicidal at this time and has no intention of hurting himself, that he is just depressed related to his colostomy and having trouble dealing with his medical issues.  Patient reassured provider that he is not suicidal at this time.  Patient encouraged to focus on positive thoughts.  Nursing contacted psychiatry who state legal hold is not necessary at this time.   4/21- Patient again stating he is  "having suicidal ideations and expressed to nursing that he has a plan to slit his throat. Discussed patient with Psychiatry and agree with her assessment that patient makes statements but his actions are not coinciding with his expressions.  Went to bedside and discussed with patient his comments.  Patient directly stated \" I am having those suicidal ideations again\".  Asked patient point blank if he wanted to live at which he stated \" yes I want to live\".  Talked at length with patient regarding possible thoughts that would enter his head and ability to focus on positive aspects.  At end of conversation patient states he had no intention of committing suicide and will focus on getting well.  Questionable secondary gain as motive for daily expression of suicidal ideation.   4/24- Patient resting in bed, states he continues to have suicidal thoughts because he is upset at having a colostomy, but then states he wants to live and get better. PT  Re-evaluated patient and feel he is at baseline and ok to return to group home. Will discuss with case management on availability to return to former living facility. Discontinued midodrine, blood pressure remaining elevated with lower dose metoprolol.   4/27: Patient was placed back on legal hold.  Legal hold paperwork is filled out and psych facility transfer is pending.  4/28: Psych facility transfer is pending.  I will discuss with surgery whether to reverse his colostomy bag is a possibility as inpatient.  4/29: No acute issues per patient today.  We will continue to evaluate for psych facility.  4/30 seconds extended legal hold.  Continue to find psych facility for placement.  This may take a while since he has a colostomy bag and the facility will take him after 6 months since has been placed.  5/1: No acute issues today  5/2: No acute issues today and will continue to wait for placement.  5/3: Legal hold discontinued by psychiatry as they believe it was due to secondary " gain rather than suicide intent.  We will find placement for this patient now.  5/4: Patient has no complaints when I examined the patient.  Later on patient did mention suicide without intent or a plan to the nursing staff it appears that he does use this as a form of malingering when he wants attention.  I did discuss with the patient that we will try to find a group home form.  Blood sugar well controlled glucose 117.  Continue current insulin regimen  T-max 97.9, /89.  5/5: Patient again endorsed to nursing staff that he had suicidal ideations.  However when I meet with the patient he reports that he really does not have suicidal ideations.  I asked him why he had made that comment he reports that he had been feeling lonely and wanted to have attention from the staff.  I discussed with him that if he is feeling lonely and would like to have someone stop by to say hi he just needs to let the staff know instead of reporting suicidal ideations, unless he really is having suicidal ideations.  Patient adamantly denies that he is having suicidal ideations.  Continuing to work with patient to find a group home.  Nursing is working with the patient so that he can change his ostomy on his own.  Glucose is 106.  T-max 97, /55  5/6: Patient has no complaints this time.  Nurse reports no overnight events.  Patient ostomy is working well.  Vital signs stable.  Glucoses 113.    Consultants/Specialty  Cardiology  Psychiatry  General surgery    Code Status  Full Code    Disposition  TBD- Civil commitment discontinued-working on discharge with case management    Review of Systems  Review of Systems   Constitutional: Negative for fever and malaise/fatigue.   Respiratory: Negative for cough and shortness of breath.    Cardiovascular: Negative for chest pain and leg swelling.   Gastrointestinal: Negative for abdominal pain, constipation, diarrhea, nausea and vomiting.        Colostomy in place    Neurological: Negative  for weakness.   Psychiatric/Behavioral: Negative for depression and suicidal ideas.   All other systems reviewed and are negative.     Physical Exam  Temp:  [36.1 °C (96.9 °F)-36.6 °C (97.9 °F)] 36.4 °C (97.5 °F)  Pulse:  [63-83] 63  Resp:  [16-18] 18  BP: (110-134)/(50-73) 111/50  SpO2:  [94 %-96 %] 96 %    Physical Exam  Vitals and nursing note reviewed.   Constitutional:       General: He is awake. He is not in acute distress.     Appearance: Normal appearance. He is well-developed. He is not ill-appearing.   HENT:      Head: Normocephalic and atraumatic.      Mouth/Throat:      Lips: Pink.      Mouth: Mucous membranes are moist.   Eyes:      General: Lids are normal.      Conjunctiva/sclera: Conjunctivae normal.      Pupils: Pupils are equal, round, and reactive to light.   Cardiovascular:      Rate and Rhythm: Normal rate and regular rhythm.      Pulses: Normal pulses.      Heart sounds: Normal heart sounds.   Pulmonary:      Effort: Pulmonary effort is normal. No respiratory distress.      Breath sounds: Normal breath sounds. No decreased breath sounds or wheezing.   Abdominal:      General: Bowel sounds are normal. There is no distension.      Palpations: Abdomen is soft.      Tenderness: There is no abdominal tenderness. There is no guarding.       Musculoskeletal:      Cervical back: Neck supple.      Right lower leg: No edema.      Left lower leg: No edema.   Skin:     General: Skin is warm and dry.      Coloration: Skin is pale.   Neurological:      General: No focal deficit present.      Mental Status: He is alert and oriented to person, place, and time.      Cranial Nerves: No cranial nerve deficit.   Psychiatric:         Attention and Perception: Attention and perception normal.         Mood and Affect: Affect normal. Mood is depressed.         Speech: Speech normal.         Behavior: Behavior normal. Behavior is cooperative.         Thought Content: Thought content does not include suicidal ideation.  Thought content does not include suicidal plan.         Cognition and Memory: Memory normal.      Comments: Patient states depressed secondary to colostomy and further need for it, stated to provider that he has no intention of harming himself and is not suicidal at this time.          Fluids    Intake/Output Summary (Last 24 hours) at 5/6/2021 1324  Last data filed at 5/6/2021 0900  Gross per 24 hour   Intake 240 ml   Output 720 ml   Net -480 ml     Laboratory          Imaging  [id number] VIEW   Final Result         No significant interval change.      DX-CHEST-LIMITED (1 VIEW)   Final Result         Diffuse interstitial prominence could relate to mild pulmonary edema or atypical infection      [id number] VIEW   Final Result      Increased colonic gas without definite bowel obstruction.      US-RENAL   Final Result      No evidence of hydronephrosis.      Lobulated kidneys bilaterally.      CT-ABDOMEN-PELVIS WITH   Final Result      1.  There is no evidence of small bowel obstruction.   2.  There is a left lower quadrant ostomy.   3.  There is fluid distention of the colon distal to the surgical material in the left mid descending colon extending all the way to the rectum. There is no pneumatosis or free air.   4.  There is cholelithiasis without biliary dilatation.   5.  There has been interval removal of the drainage catheter anterior to the spleen with minimal hypodense area in the anterior spleen again noted. There is no new fluid collection.      IR-US GUIDED PIV   Final Result    Ultrasound-guided PERIPHERAL IV INSERTION performed by    qualified nursing staff as above.      EC-ECHOCARDIOGRAM COMPLETE W/O CONT   Final Result      DX-LUMBAR SPINE-2 OR 3 VIEWS   Final Result      Moderate compression deformity of T11 is new compared to 2018.      Mild wedge deformity of T12 is unchanged.      Degenerative changes including facet arthropathy.      Mild retrolisthesis of L5 on S1 and L3 on L4.           "  Assessment/Plan  * Suicidal ideation- (present on admission)  Assessment & Plan  -Has history of MDD, recurrent with psychotic features. active SI w/ plan on admission  -Psychiatry following, recommended discontinuation of civil commitment - suspect possible malingering or secondary gain   - Evaluated bedside again and discussed with patient who states he \"wants to live\"   -Psych recommending sitter to ensure patient does not attempt actions that would extend his stay   5/5: Patient reports that he is telling staff that his suicidal ideations because he would like attention and not because he is actually having suicidal ideations.    Orthostatic hypotension- (present on admission)  Assessment & Plan  -Multifactorial including adrenal insufficiency, medications (beta blocker, CCB, tamsulosin), diabetic autonomic dysfunction, and venous insufficiency.  -EF is noted to be 60%, significant improvement from prior (previously 35%).  -Per cardiology, no further cardiac work-up needed.  Recommends follow-up with them as outpatient.  -Fall precautions.  -Continue to educate on the importance of slow positional changes.   - Started PO hydrocortisone 4/7/2021 for AI. Continuing florinef.   -Decreased toprolol which allowed for discontinuation of midodrine  Improved     Paroxysmal atrial fibrillation (HCC)- (present on admission)  Assessment & Plan  -CHADSVASC 3 (HTN, CHF, DM).   -TTE Feb 2021: EF 60%.  -Continue metoprolol, digoxin, and eliquis.    Chronic heart failure with preserved ejection fraction (HCC)- (present on admission)  Assessment & Plan  -Now stable.  -Pulmonary edema noted in mid-March, was placed on IV diuresis though that was held when he developed a gram-negative gina bacteremia and sepsis.  Currently doing well from a respiratory standpoint, no shortness of breath, lungs clear to auscultation bilaterally, not requiring oxygen supplementation.  -Repeat echocardiogram showed improvement in his ejection " fraction to 60%.    -Continue metoprolol succinate.  -Lisinopril held per cardiology recommendations due to hypotension.    Diabetes mellitus type 2, insulin dependent (HCC)- (present on admission)  Assessment & Plan  -A1c 5.2% on 2/14/2021.  -Continue diabetic diet.  -Continue lantus at current dose   -Hypoglycemia protocol in place if needed.      Major depressive disorder, recurrent, severe with psychotic features (HCC)- (present on admission)  Assessment & Plan  -Management as listed under 'anxiety and depression'.    Anxiety and depression- (present on admission)  Assessment & Plan  -Legal hold extended, awaiting acceptance at psych facility ([facility] will take in May).  -Remains expressing suicidal ideations but actions contradict.   -Psychiatry signed off feel he is malingering   -Continue paxil, klonopin, & geodon.     Hypothyroidism- (present on admission)  Assessment & Plan  -TFTs in Feb were WNL. Continue levothyroxine at current dose.     Adrenal insufficiency (HCC)- (present on admission)  Assessment & Plan  -Started PO hydrocortisone 4/7/2021. Continue florinef.   -Needs outpatient endocrinology follow up.  -Monitor potassium while on florinef. 4.4 on 4/7/2021.  -Repeat orthostatics periodically if symptomatic         Obstructive uropathy- (present on admission)  Assessment & Plan  -Trial off of flomax given his hypotension.  Discontinued 4/6/2021. No retention continue off Flomax    Chronic venous insufficiency of lower extremity- (present on admission)  Assessment & Plan  -Continue compression stockings.    Colostomy present (HCC)- (present on admission)  Assessment & Plan  -History of fall at home Nov 2020 on left side with perforated colon and splenic fluid collection/hematoma s/p segmental colectomy, colostomy, mobilization of the splenic flexure, wound vac, and I&D Nov 2020.  -Wound care consulted for ostomy care, but he was non-participatory. He is now engaging in ostomy care with his bedside RNs,  encouraged to be observant but do it independently.  -Patient must be independently able to care for his ostomy before inpatient psychiatry will accept him.  Per surgery Dr. Desouza, ostomy reversal is high risk given patient's complexities. Surgery recommending ostomy reversal as an outpatient    Debility- (present on admission)  Assessment & Plan  -Cleared by PT/OT.  -Out of bed for each meal and ambulate in the hallways at least twice per shift.    Mixed hyperlipidemia- (present on admission)  Assessment & Plan  Lab Results   Component Value Date/Time    CHOLSTRLTOT 146 02/14/2021 01:30 AM    LDL 82 02/14/2021 01:30 AM    HDL 32 (A) 02/14/2021 01:30 AM    TRIGLYCERIDE 160 (H) 02/14/2021 01:30 AM   -Continue atorvastatin.     Lower limb amputation, great toe (HCC)- (present on admission)  Assessment & Plan  -History of, 2018.  Site well-healed.    Class 2 severe obesity with serious comorbidity and body mass index (BMI) of 35.0 to 35.9 in adult (HCC)- (present on admission)  Assessment & Plan  Body mass index is 35.19 kg/m².-Trending down.  -Could benefit from outpatient weight loss counseling which can be arranged through his PCP after hospital discharge.       VTE prophylaxis: Eliquis    I have performed a physical exam and reviewed and updated ROS and Plan today (5/6/2021). In review of yesterday's note (5/5/2021), there are no changes except as documented above.

## 2021-05-07 LAB — GLUCOSE BLD-MCNC: 104 MG/DL (ref 65–99)

## 2021-05-07 PROCEDURE — A9270 NON-COVERED ITEM OR SERVICE: HCPCS | Performed by: NURSE PRACTITIONER

## 2021-05-07 PROCEDURE — 700102 HCHG RX REV CODE 250 W/ 637 OVERRIDE(OP): Performed by: HOSPITALIST

## 2021-05-07 PROCEDURE — 700102 HCHG RX REV CODE 250 W/ 637 OVERRIDE(OP): Performed by: STUDENT IN AN ORGANIZED HEALTH CARE EDUCATION/TRAINING PROGRAM

## 2021-05-07 PROCEDURE — 99232 SBSQ HOSP IP/OBS MODERATE 35: CPT | Performed by: STUDENT IN AN ORGANIZED HEALTH CARE EDUCATION/TRAINING PROGRAM

## 2021-05-07 PROCEDURE — 700102 HCHG RX REV CODE 250 W/ 637 OVERRIDE(OP): Performed by: NURSE PRACTITIONER

## 2021-05-07 PROCEDURE — 770001 HCHG ROOM/CARE - MED/SURG/GYN PRIV*

## 2021-05-07 PROCEDURE — 90832 PSYTX W PT 30 MINUTES: CPT | Performed by: PSYCHOLOGIST

## 2021-05-07 PROCEDURE — A9270 NON-COVERED ITEM OR SERVICE: HCPCS | Performed by: STUDENT IN AN ORGANIZED HEALTH CARE EDUCATION/TRAINING PROGRAM

## 2021-05-07 PROCEDURE — A9270 NON-COVERED ITEM OR SERVICE: HCPCS | Performed by: HOSPITALIST

## 2021-05-07 PROCEDURE — 82962 GLUCOSE BLOOD TEST: CPT

## 2021-05-07 RX ADMIN — DOCUSATE SODIUM 50 MG AND SENNOSIDES 8.6 MG 2 TABLET: 8.6; 5 TABLET, FILM COATED ORAL at 04:51

## 2021-05-07 RX ADMIN — CLONAZEPAM 1 MG: 1 TABLET ORAL at 16:38

## 2021-05-07 RX ADMIN — SIMETHICONE 80 MG: 80 TABLET, CHEWABLE ORAL at 16:38

## 2021-05-07 RX ADMIN — ATORVASTATIN CALCIUM 40 MG: 40 TABLET, FILM COATED ORAL at 16:38

## 2021-05-07 RX ADMIN — APIXABAN 5 MG: 5 TABLET, FILM COATED ORAL at 20:23

## 2021-05-07 RX ADMIN — FLUDROCORTISONE ACETATE 0.1 MG: 0.1 TABLET ORAL at 04:50

## 2021-05-07 RX ADMIN — METOPROLOL SUCCINATE 12.5 MG: 25 TABLET, EXTENDED RELEASE ORAL at 04:51

## 2021-05-07 RX ADMIN — HYDROCORTISONE 10 MG: 10 TABLET ORAL at 04:54

## 2021-05-07 RX ADMIN — CLONAZEPAM 0.5 MG: 0.5 TABLET ORAL at 09:01

## 2021-05-07 RX ADMIN — HYDROCORTISONE 5 MG: 10 TABLET ORAL at 15:39

## 2021-05-07 RX ADMIN — SIMETHICONE 80 MG: 80 TABLET, CHEWABLE ORAL at 04:51

## 2021-05-07 RX ADMIN — SIMETHICONE 80 MG: 80 TABLET, CHEWABLE ORAL at 11:50

## 2021-05-07 RX ADMIN — DIGOXIN 125 MCG: 125 TABLET ORAL at 16:38

## 2021-05-07 RX ADMIN — INSULIN GLARGINE 5 UNITS: 100 INJECTION, SOLUTION SUBCUTANEOUS at 06:23

## 2021-05-07 RX ADMIN — LEVOTHYROXINE SODIUM 112 MCG: 0.11 TABLET ORAL at 04:50

## 2021-05-07 RX ADMIN — PAROXETINE HYDROCHLORIDE 20 MG: 20 TABLET, FILM COATED ORAL at 09:01

## 2021-05-07 RX ADMIN — ZIPRASIDONE HYDROCHLORIDE 60 MG: 60 CAPSULE ORAL at 20:23

## 2021-05-07 RX ADMIN — APIXABAN 5 MG: 5 TABLET, FILM COATED ORAL at 09:01

## 2021-05-07 RX ADMIN — POTASSIUM CHLORIDE 20 MEQ: 20 TABLET, EXTENDED RELEASE ORAL at 04:51

## 2021-05-07 RX ADMIN — ZIPRASIDONE HYDROCHLORIDE 60 MG: 60 CAPSULE ORAL at 09:01

## 2021-05-07 ASSESSMENT — ENCOUNTER SYMPTOMS
COUGH: 0
SHORTNESS OF BREATH: 0
FEVER: 0
DEPRESSION: 0
ROS GI COMMENTS: COLOSTOMY IN PLACE
WEAKNESS: 0
VOMITING: 0
NAUSEA: 0
ABDOMINAL PAIN: 0
CONSTIPATION: 0
DIARRHEA: 0

## 2021-05-07 ASSESSMENT — PAIN DESCRIPTION - PAIN TYPE
TYPE: ACUTE PAIN
TYPE: ACUTE PAIN

## 2021-05-07 NOTE — CARE PLAN
Problem: Communication  Goal: The ability to communicate needs accurately and effectively will improve  Outcome: PROGRESSING AS EXPECTED     Problem: Safety  Goal: Will remain free from injury  Outcome: PROGRESSING AS EXPECTED  Goal: Will remain free from falls  Outcome: PROGRESSING AS EXPECTED  Note: Patient remains safe and free from injury, patient with tele sitter in place for SI and patient calls for help appropriately prior to ambulating.      Problem: Infection  Goal: Will remain free from infection  Outcome: PROGRESSING AS EXPECTED  Note: Patient without signs of infection at this time, infection prevention practiced.

## 2021-05-07 NOTE — PROGRESS NOTES
2 RN skin check completed with ELISA Keita.   Devices in place: Colostomy.  Skin assessed under devices: yes.  Confirmed pressure ulcers found: none.  New potential pressure ulcers noted: none.  Wound consult placed:  n/a.      Skin assessment: Ears and nares blanching. Bilateral elbows pink and blanching. Bilateral heels dry flaky and blanching. Mid abdomen fistula with scant drainage. LLQ ostomy with beefy red stoma. Sacrum pink and blanching.      The following interventions in place: Waffle mattress overlay. Abdominal fistula new hydrofera blue applied. Ostomy care done. Pillows for positioning and support. Encouraged to mobilize on bed. Frequent linen changes.

## 2021-05-07 NOTE — PROGRESS NOTES
Hospital Medicine Twice Weekly Progress Note    Date of Service  5/7/2021      Chief Complaint  Suicidal ideation    Hospital Course  Mr. Castro is a 56 year-old man with a past medical history significant for atrial fibrillation, secondary hypercoagulable state, diabetes mellitus complicated by diabetic neuropathy, chronic systolic CHF, colostomy placement, orthostatic hypotension (maintained on florinef), and dyslipidemia who presented to the ED on 2/9/2021 after he became dizzy and had a syncopal episode at home.  In the ED he was noted to be hypotensive and bradycardic. Cardiology was consulted and recommended stopping all AV sanket blocking medications.  He remained orthostatic during his hospitalization so midodrine was started.  An echocardiogram was obtained and showed mild mitral regurgitation and an EF of 60% which was improved from his previous echocardiogram obtained on 11/2020 (previously 35%).  CTAs of the head and neck were done and were unremarkable outside of distal bilateral ICA tortuosity.  Also during his hospitalization he expressed desire to kill himself.  He has a colostomy and has become overwhelmed by caring for it.  He was subsequently placed on a legal hold and psychiatry was consulted.  He is now pending [facility] placement at the 6-month db for maturity of his colostomy.    Interval Problem Update  4/20- Patient's civil commitment was discontinued this morning.  Received message from nursing that patient again stating suicidal thoughts to staff.  On discussion with patient he states he is not suicidal at this time and has no intention of hurting himself, that he is just depressed related to his colostomy and having trouble dealing with his medical issues.  Patient reassured provider that he is not suicidal at this time.  Patient encouraged to focus on positive thoughts.  Nursing contacted psychiatry who state legal hold is not necessary at this time.   4/21- Patient again stating he is  "having suicidal ideations and expressed to nursing that he has a plan to slit his throat. Discussed patient with Psychiatry and agree with her assessment that patient makes statements but his actions are not coinciding with his expressions.  Went to bedside and discussed with patient his comments.  Patient directly stated \" I am having those suicidal ideations again\".  Asked patient point blank if he wanted to live at which he stated \" yes I want to live\".  Talked at length with patient regarding possible thoughts that would enter his head and ability to focus on positive aspects.  At end of conversation patient states he had no intention of committing suicide and will focus on getting well.  Questionable secondary gain as motive for daily expression of suicidal ideation.   4/24- Patient resting in bed, states he continues to have suicidal thoughts because he is upset at having a colostomy, but then states he wants to live and get better. PT  Re-evaluated patient and feel he is at baseline and ok to return to group home. Will discuss with case management on availability to return to former living facility. Discontinued midodrine, blood pressure remaining elevated with lower dose metoprolol.   4/27: Patient was placed back on legal hold.  Legal hold paperwork is filled out and psych facility transfer is pending.  4/28: Psych facility transfer is pending.  I will discuss with surgery whether to reverse his colostomy bag is a possibility as inpatient.  4/29: No acute issues per patient today.  We will continue to evaluate for psych facility.  4/30 seconds extended legal hold.  Continue to find psych facility for placement.  This may take a while since he has a colostomy bag and the facility will take him after 6 months since has been placed.  5/1: No acute issues today  5/2: No acute issues today and will continue to wait for placement.  5/3: Legal hold discontinued by psychiatry as they believe it was due to secondary " gain rather than suicide intent.  We will find placement for this patient now.  5/4: Patient has no complaints when I examined the patient.  Later on patient did mention suicide without intent or a plan to the nursing staff it appears that he does use this as a form of malingering when he wants attention.  I did discuss with the patient that we will try to find a group home form.  Blood sugar well controlled glucose 117.  Continue current insulin regimen  T-max 97.9, /89.  5/5: Patient again endorsed to nursing staff that he had suicidal ideations.  However when I meet with the patient he reports that he really does not have suicidal ideations.  I asked him why he had made that comment he reports that he had been feeling lonely and wanted to have attention from the staff.  I discussed with him that if he is feeling lonely and would like to have someone stop by to say hi he just needs to let the staff know instead of reporting suicidal ideations, unless he really is having suicidal ideations.  Patient adamantly denies that he is having suicidal ideations.  Continuing to work with patient to find a group home.  Nursing is working with the patient so that he can change his ostomy on his own.  Glucose is 106.  T-max 97, /55  5/6: Patient has no complaints this time.  Nurse reports no overnight events.  Patient ostomy is working well.  Vital signs stable.  Glucoses 113.  5/7: Patient periodically tells nursing staff due to suicidal ideations however discussion with the patient he denies any suicidal ideations and reports that he was wanting attention.  Reemphasized to the patient that reporting suicidal ideations are not true is an appropriate and that if he is needing something he just needs to ask patient reports understanding.  Continuing telemetry sitter.  Glucose well controlled 104 continue current management  T-max 98, /68.    Consultants/Specialty  Cardiology  Psychiatry  General surgery    Code  Status  Full Code    Disposition  TBD- Civil commitment discontinued-working on discharge with case management    Review of Systems  Review of Systems   Constitutional: Negative for fever and malaise/fatigue.   Respiratory: Negative for cough and shortness of breath.    Cardiovascular: Negative for chest pain and leg swelling.   Gastrointestinal: Negative for abdominal pain, constipation, diarrhea, nausea and vomiting.        Colostomy in place    Neurological: Negative for weakness.   Psychiatric/Behavioral: Negative for depression and suicidal ideas.   All other systems reviewed and are negative.     Physical Exam  Temp:  [36.3 °C (97.3 °F)-36.7 °C (98 °F)] 36.6 °C (97.8 °F)  Pulse:  [60-83] 71  Resp:  [17-18] 18  BP: (100-120)/(60-68) 120/68  SpO2:  [93 %-95 %] 95 %    Physical Exam  Vitals and nursing note reviewed.   Constitutional:       General: He is awake. He is not in acute distress.     Appearance: Normal appearance. He is well-developed. He is not ill-appearing.   HENT:      Head: Normocephalic and atraumatic.      Mouth/Throat:      Lips: Pink.      Mouth: Mucous membranes are moist.   Eyes:      General: Lids are normal.      Conjunctiva/sclera: Conjunctivae normal.      Pupils: Pupils are equal, round, and reactive to light.   Cardiovascular:      Rate and Rhythm: Normal rate and regular rhythm.      Pulses: Normal pulses.      Heart sounds: Normal heart sounds.   Pulmonary:      Effort: Pulmonary effort is normal. No respiratory distress.      Breath sounds: Normal breath sounds. No decreased breath sounds or wheezing.   Abdominal:      General: Bowel sounds are normal. There is no distension.      Palpations: Abdomen is soft.      Tenderness: There is no abdominal tenderness. There is no guarding.       Musculoskeletal:      Cervical back: Neck supple.      Right lower leg: No edema.      Left lower leg: No edema.   Skin:     General: Skin is warm and dry.      Coloration: Skin is pale.    Neurological:      General: No focal deficit present.      Mental Status: He is alert and oriented to person, place, and time.      Cranial Nerves: No cranial nerve deficit.   Psychiatric:         Attention and Perception: Attention and perception normal.         Mood and Affect: Affect normal. Mood is depressed.         Speech: Speech normal.         Behavior: Behavior normal. Behavior is cooperative.         Thought Content: Thought content does not include suicidal ideation. Thought content does not include suicidal plan.         Cognition and Memory: Memory normal.      Comments: Patient states depressed secondary to colostomy and further need for it, stated to provider that he has no intention of harming himself and is not suicidal at this time.          Fluids    Intake/Output Summary (Last 24 hours) at 5/7/2021 1333  Last data filed at 5/7/2021 1300  Gross per 24 hour   Intake 460 ml   Output 450 ml   Net 10 ml     Laboratory          Imaging  [id number] VIEW   Final Result         No significant interval change.      DX-CHEST-LIMITED (1 VIEW)   Final Result         Diffuse interstitial prominence could relate to mild pulmonary edema or atypical infection      [id number] VIEW   Final Result      Increased colonic gas without definite bowel obstruction.      US-RENAL   Final Result      No evidence of hydronephrosis.      Lobulated kidneys bilaterally.      CT-ABDOMEN-PELVIS WITH   Final Result      1.  There is no evidence of small bowel obstruction.   2.  There is a left lower quadrant ostomy.   3.  There is fluid distention of the colon distal to the surgical material in the left mid descending colon extending all the way to the rectum. There is no pneumatosis or free air.   4.  There is cholelithiasis without biliary dilatation.   5.  There has been interval removal of the drainage catheter anterior to the spleen with minimal hypodense area in the anterior spleen again noted. There is no new fluid  "collection.      IR-US GUIDED PIV   Final Result    Ultrasound-guided PERIPHERAL IV INSERTION performed by    qualified nursing staff as above.      EC-ECHOCARDIOGRAM COMPLETE W/O CONT   Final Result      DX-LUMBAR SPINE-2 OR 3 VIEWS   Final Result      Moderate compression deformity of T11 is new compared to 2018.      Mild wedge deformity of T12 is unchanged.      Degenerative changes including facet arthropathy.      Mild retrolisthesis of L5 on S1 and L3 on L4.            Assessment/Plan  * Suicidal ideation- (present on admission)  Assessment & Plan  -Has history of MDD, recurrent with psychotic features. active SI w/ plan on admission  -Psychiatry following, recommended discontinuation of civil commitment - suspect possible malingering or secondary gain   - Evaluated bedside again and discussed with patient who states he \"wants to live\"   -Psych recommending sitter to ensure patient does not attempt actions that would extend his stay   5/5: Patient reports that he is telling staff that his suicidal ideations because he would like attention and not because he is actually having suicidal ideations.    Orthostatic hypotension- (present on admission)  Assessment & Plan  -Multifactorial including adrenal insufficiency, medications (beta blocker, CCB, tamsulosin), diabetic autonomic dysfunction, and venous insufficiency.  -EF is noted to be 60%, significant improvement from prior (previously 35%).  -Per cardiology, no further cardiac work-up needed.  Recommends follow-up with them as outpatient.  -Fall precautions.  -Continue to educate on the importance of slow positional changes.   - Started PO hydrocortisone 4/7/2021 for AI. Continuing florinef.   -Decreased toprolol which allowed for discontinuation of midodrine  Improved     Paroxysmal atrial fibrillation (HCC)- (present on admission)  Assessment & Plan  -CHADSVASC 3 (HTN, CHF, DM).   -TTE Feb 2021: EF 60%.  -Continue metoprolol, digoxin, and " eliquis.    Chronic heart failure with preserved ejection fraction (HCC)- (present on admission)  Assessment & Plan  -Now stable.  -Pulmonary edema noted in mid-March, was placed on IV diuresis though that was held when he developed a gram-negative gina bacteremia and sepsis.  Currently doing well from a respiratory standpoint, no shortness of breath, lungs clear to auscultation bilaterally, not requiring oxygen supplementation.  -Repeat echocardiogram showed improvement in his ejection fraction to 60%.    -Continue metoprolol succinate.  -Lisinopril held per cardiology recommendations due to hypotension.    Diabetes mellitus type 2, insulin dependent ([facility])- (present on admission)  Assessment & Plan  -A1c 5.2% on 2/14/2021.  -Continue diabetic diet.  -Continue lantus at current dose   -Hypoglycemia protocol in place if needed.      Major depressive disorder, recurrent, severe with psychotic features ([facility])- (present on admission)  Assessment & Plan  -Management as listed under 'anxiety and depression'.    Anxiety and depression- (present on admission)  Assessment & Plan  -Legal hold extended, awaiting acceptance at psych facility ([facility] will take in May).  -Remains expressing suicidal ideations but actions contradict.   -Psychiatry signed off feel he is malingering   -Continue paxil, klonopin, & geodon.     Hypothyroidism- (present on admission)  Assessment & Plan  -TFTs in Feb were WNL. Continue levothyroxine at current dose.     Adrenal insufficiency (HCC)- (present on admission)  Assessment & Plan  -Started PO hydrocortisone 4/7/2021. Continue florinef.   -Needs outpatient endocrinology follow up.  -Monitor potassium while on florinef. 4.4 on 4/7/2021.  -Repeat orthostatics periodically if symptomatic         Obstructive uropathy- (present on admission)  Assessment & Plan  -Trial off of flomax given his hypotension.  Discontinued 4/6/2021. No retention continue off Flomax    Chronic venous insufficiency of lower  extremity- (present on admission)  Assessment & Plan  -Continue compression stockings.    Colostomy present (HCC)- (present on admission)  Assessment & Plan  -History of fall at home Nov 2020 on left side with perforated colon and splenic fluid collection/hematoma s/p segmental colectomy, colostomy, mobilization of the splenic flexure, wound vac, and I&D Nov 2020.  -Wound care consulted for ostomy care, but he was non-participatory. He is now engaging in ostomy care with his bedside RNs, encouraged to be observant but do it independently.  -Patient must be independently able to care for his ostomy before inpatient psychiatry will accept him.  Per surgery Dr. Desouza, ostomy reversal is high risk given patient's complexities. Surgery recommending ostomy reversal as an outpatient    Debility- (present on admission)  Assessment & Plan  -Cleared by PT/OT.  -Out of bed for each meal and ambulate in the hallways at least twice per shift.    Mixed hyperlipidemia- (present on admission)  Assessment & Plan  Lab Results   Component Value Date/Time    CHOLSTRLTOT 146 02/14/2021 01:30 AM    LDL 82 02/14/2021 01:30 AM    HDL 32 (A) 02/14/2021 01:30 AM    TRIGLYCERIDE 160 (H) 02/14/2021 01:30 AM   -Continue atorvastatin.     Lower limb amputation, great toe (HCC)- (present on admission)  Assessment & Plan  -History of, 2018.  Site well-healed.    Class 2 severe obesity with serious comorbidity and body mass index (BMI) of 35.0 to 35.9 in adult (HCC)- (present on admission)  Assessment & Plan  Body mass index is 35.19 kg/m².-Trending down.  -Could benefit from outpatient weight loss counseling which can be arranged through his PCP after hospital discharge.       VTE prophylaxis: Eliquis    I have performed a physical exam and reviewed and updated ROS and Plan today (5/7/2021). In review of yesterday's note (5/6/2021), there are no changes except as documented above.

## 2021-05-07 NOTE — PROGRESS NOTES
Patient alert and oriented, vss, denies pain at this time. Patient ostomy appliance changed due to leaking this morning. Patient with tele sitter in place for SI, denies SI at this time. Will continue to monitor.

## 2021-05-07 NOTE — CARE PLAN
Problem: Safety  Goal: Will remain free from injury  Outcome: PROGRESSING AS EXPECTED  Note: Pt on suicide protocols, continued on telesitter monitoring. Hourly rounding in place.     Problem: Bowel/Gastric:  Goal: Normal bowel function is maintained or improved  Outcome: PROGRESSING AS EXPECTED  Note: Pt abdominal fistula dressing changed, no s/sx of infection. Colostomy bag in place, stoma beefy red.

## 2021-05-07 NOTE — CONSULTS
"PSYCHOLOGICAL FOLLOW-UP:  Reason for admission: Syncope and collapse [R55]  Suicidal ideation [R45.851]  Sepsis due to Klebsiella pneumoniae (HCC) [A41.4]  Length of Visit: 30min    Legal status: Legal Status: Voluntary    Chief Complaint: \"I'm doing ok.\"    HPI: Met with the patient to assess risk level and conduct crisis intervention services. Patient presented with a brighter affect and reported a \"ok\" mood. He endorsed passive suicidal thoughts and denied plan and intent to follow through with those thoughts. He is future focused on discharging from the hospital to a new home. Reviewed how the patient has been practicing the skill of noticing thoughts and choosing not to listen to them or act upon them. Patient endorsed practicing this skill with the help of self-care activities such as walking.     Psychiatric Examination:  Vitals: Blood pressure 120/68, pulse 71, temperature 36.6 °C (97.8 °F), temperature source Temporal, resp. rate 18, height 1.829 m (6'), weight 116 kg (256 lb 13.4 oz), SpO2 95 %.  Musculoskeletal: normal psychomotor activity, no tics or unusual mannerisms noted  Appearance and Eye Contact: appropriate dress and grooming. Behavior is calm, cooperative,  appropriate eye contact  Attention/Alertness: Alert  Thought Process: Linear, Logical and Goal Directed    Thought Content: No psychotic processes noted  Speech: Clear with normal rate and rhythm  Mood: \"ok\"            Affect: brighter         SI/HI: Endorses thoughts and DENIES PLAN AND INTENT    Memory: Recent and remote memory appear intact    Orientation: alert, oriented to person, place and time  Insight into symptoms: good  Judgement into symptoms:good    ASSESSMENT: The patient continues to experience chronic suicidal thoughts that are usually connected to stress and/or perceived stress. He denies plan and intent to commit suicide and remains future focused on discharging to a new home. He does not meet criteria for a legal hold. "     DSM5 Diagnostic Considerations:   Unspecified Personality Disorder   -Borderline? Dependent?  Major Depressive Disorder, recurrent, severe, with psychotic features     PLAN:  Legal status: Voluntary     PLEASE KEEP TELE-SITTER IN THE ROOM UNTIL DISCHARGED    If patient reports suicidal thoughts, please try the following:     -Remind him that he can have thoughts and choose to not believe them and not act upon them. Thoughts are just thoughts. This is the skill he has been practicing.      -Encourage self-care actions like taking a walk which typically helps the patient feel better.      Anticipate F/U within 48 hours.   Records reviewed: yes  Will continue to follow while he is in the acute care setting   Thank you for the consult.    Madhuri Fernandez, Ph.D.

## 2021-05-07 NOTE — PROGRESS NOTES
Pt alert and oriented x4, on room air. Colostomy in place with beefy red stoma. Abdominal fistula dressing changed. Medications taken orally. Telesitter in place. Continued on S.I and fall precautions. Hourly rounding in place. Will continue to monitor.

## 2021-05-08 LAB — GLUCOSE BLD-MCNC: 104 MG/DL (ref 65–99)

## 2021-05-08 PROCEDURE — 700102 HCHG RX REV CODE 250 W/ 637 OVERRIDE(OP): Performed by: STUDENT IN AN ORGANIZED HEALTH CARE EDUCATION/TRAINING PROGRAM

## 2021-05-08 PROCEDURE — 700102 HCHG RX REV CODE 250 W/ 637 OVERRIDE(OP): Performed by: HOSPITALIST

## 2021-05-08 PROCEDURE — A9270 NON-COVERED ITEM OR SERVICE: HCPCS | Performed by: STUDENT IN AN ORGANIZED HEALTH CARE EDUCATION/TRAINING PROGRAM

## 2021-05-08 PROCEDURE — 700102 HCHG RX REV CODE 250 W/ 637 OVERRIDE(OP): Performed by: NURSE PRACTITIONER

## 2021-05-08 PROCEDURE — A9270 NON-COVERED ITEM OR SERVICE: HCPCS | Performed by: NURSE PRACTITIONER

## 2021-05-08 PROCEDURE — A9270 NON-COVERED ITEM OR SERVICE: HCPCS | Performed by: HOSPITALIST

## 2021-05-08 PROCEDURE — 99231 SBSQ HOSP IP/OBS SF/LOW 25: CPT | Performed by: STUDENT IN AN ORGANIZED HEALTH CARE EDUCATION/TRAINING PROGRAM

## 2021-05-08 PROCEDURE — 82962 GLUCOSE BLOOD TEST: CPT

## 2021-05-08 PROCEDURE — 770001 HCHG ROOM/CARE - MED/SURG/GYN PRIV*

## 2021-05-08 RX ADMIN — CLONAZEPAM 1 MG: 1 TABLET ORAL at 17:11

## 2021-05-08 RX ADMIN — METOPROLOL SUCCINATE 12.5 MG: 25 TABLET, EXTENDED RELEASE ORAL at 04:47

## 2021-05-08 RX ADMIN — ZIPRASIDONE HYDROCHLORIDE 60 MG: 60 CAPSULE ORAL at 08:28

## 2021-05-08 RX ADMIN — POTASSIUM CHLORIDE 20 MEQ: 20 TABLET, EXTENDED RELEASE ORAL at 04:47

## 2021-05-08 RX ADMIN — SIMETHICONE 80 MG: 80 TABLET, CHEWABLE ORAL at 04:47

## 2021-05-08 RX ADMIN — ATORVASTATIN CALCIUM 40 MG: 40 TABLET, FILM COATED ORAL at 17:10

## 2021-05-08 RX ADMIN — APIXABAN 5 MG: 5 TABLET, FILM COATED ORAL at 08:28

## 2021-05-08 RX ADMIN — SIMETHICONE 80 MG: 80 TABLET, CHEWABLE ORAL at 17:11

## 2021-05-08 RX ADMIN — INSULIN GLARGINE 5 UNITS: 100 INJECTION, SOLUTION SUBCUTANEOUS at 04:51

## 2021-05-08 RX ADMIN — DIGOXIN 125 MCG: 125 TABLET ORAL at 17:11

## 2021-05-08 RX ADMIN — ZIPRASIDONE HYDROCHLORIDE 60 MG: 60 CAPSULE ORAL at 19:56

## 2021-05-08 RX ADMIN — APIXABAN 5 MG: 5 TABLET, FILM COATED ORAL at 19:56

## 2021-05-08 RX ADMIN — HYDROCORTISONE 10 MG: 10 TABLET ORAL at 04:47

## 2021-05-08 RX ADMIN — PAROXETINE HYDROCHLORIDE 20 MG: 20 TABLET, FILM COATED ORAL at 08:28

## 2021-05-08 RX ADMIN — SIMETHICONE 80 MG: 80 TABLET, CHEWABLE ORAL at 12:02

## 2021-05-08 RX ADMIN — DOCUSATE SODIUM 50 MG AND SENNOSIDES 8.6 MG 2 TABLET: 8.6; 5 TABLET, FILM COATED ORAL at 04:47

## 2021-05-08 RX ADMIN — FLUDROCORTISONE ACETATE 0.1 MG: 0.1 TABLET ORAL at 04:47

## 2021-05-08 RX ADMIN — CLONAZEPAM 0.5 MG: 0.5 TABLET ORAL at 08:27

## 2021-05-08 RX ADMIN — LEVOTHYROXINE SODIUM 112 MCG: 0.11 TABLET ORAL at 04:47

## 2021-05-08 RX ADMIN — HYDROCORTISONE 5 MG: 10 TABLET ORAL at 15:52

## 2021-05-08 ASSESSMENT — ENCOUNTER SYMPTOMS
ROS GI COMMENTS: COLOSTOMY IN PLACE
COUGH: 0
ABDOMINAL PAIN: 0
CONSTIPATION: 0
NAUSEA: 0
VOMITING: 0
SHORTNESS OF BREATH: 0
DEPRESSION: 0
DIARRHEA: 0
WEAKNESS: 0
FEVER: 0

## 2021-05-08 ASSESSMENT — PAIN DESCRIPTION - PAIN TYPE: TYPE: ACUTE PAIN

## 2021-05-08 NOTE — WOUND TEAM
[facility]  Inpatient Services  Wound and Skin Care Progress Note    Admission Date: 2/9/2021     Last order of IP CONSULT TO WOUND CARE was found on 3/18/2021 from Hospital Encounter on 2/9/2021     HPI, PMH, SH: Reviewed    Past Surgical History:   Procedure Laterality Date   • COLECTOMY N/A 11/10/2020    Procedure: COLECTOMY-SEGMENTAL, COLOSTOMY, MOBILIZATION OF SPLENIC FLEXURE, WOUND VAC PLACEMENT, IRRIGATION AND DEBRIDMENT FLANK WOUND;  Surgeon: Kin Desouza D.O.;  Location: [facility];  Service: General   • ZZZ CARDIAC CATH  07/21/2018    EF 45%, normal coronaries.   • IRRIGATION & DEBRIDEMENT ORTHO Right 2/19/2018    Procedure: IRRIGATION & DEBRIDEMENT ORTHO-FOOT;  Surgeon: Kirby Lopez M.D.;  Location: [facility];  Service: Orthopedics   • TOE AMPUTATION Right 1/17/2018    Procedure: TOE AMPUTATION-1ST RAY;  Surgeon: Doug Delong M.D.;  Location: [facility];  Service: Orthopedics   • TOE AMPUTATION Right 11/10/2017    Procedure: TOE AMPUTATION;  Surgeon: Osorio Leo M.D.;  Location: [facility];  Service: Orthopedics     Social History     Tobacco Use   • Smoking status: Never Smoker   • Smokeless tobacco: Never Used   Substance Use Topics   • Alcohol use: No     Chief Complaint   Patient presents with   • Syncope     X1 this morning when standing up to go to bathroom     Diagnosis: Syncope and collapse [R55]  Suicidal ideation [R45.851]  Sepsis due to Klebsiella pneumoniae (HCC) [A41.4]    Unit where seen by Wound Team: S148/00     WOUND CONSULT/FOLLOW UP RELATED TO: abdominal wound f/u    WOUND HISTORY:  Pt is an older gentleman well known to [facility] wound team 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 Pt currently admitted since 2/9/21 related to SI concerns due to body image issues related to colostomy. Wound team was consulted regarding sacrum and Right posterior thigh wounds.     WOUND ASSESSMENT/LDA        Wound 11/10/20 Full Thickness Wound Abdomen Midline resolving open surgical  (Active)   Wound Image   05/08/21 1655   Site Assessment Red 05/08/21 1655   Periwound Assessment Pink;Scar tissue 05/08/21 1655   Margins Attached edges 05/08/21 1655   Closure Secondary intention 05/08/21 1655   Drainage Amount Scant 05/08/21 1655   Drainage Description Serosanguineous 05/08/21 1655   Treatments Site care 05/08/21 1655   Wound Cleansing Normal Saline Irrigation 05/08/21 1655   Periwound Protectant Not Applicable 05/08/21 1655   Dressing Cleansing/Solutions Not Applicable 05/08/21 1655   Dressing Options Collagen Dressing;Hydrofera Blue Classic 05/08/21 1655   Dressing Changed Changed 05/08/21 1655   Dressing Status Intact 05/08/21 1655   Dressing Change/Treatment Frequency Every 72 hrs, and As Needed 05/08/21 1655   NEXT Dressing Change/Treatment Date 05/11/21 05/08/21 1655   NEXT Weekly Photo (Inpatient Only) 05/15/21 05/08/21 1655   Number of Staples Removed 10 12/09/20 1030   Non-staged Wound Description Partial thickness 05/08/21 1655   Wound Length (cm) 1.5 cm 05/08/21 1655   Wound Width (cm) 2 cm 05/08/21 1655   Wound Depth (cm) 0 cm 05/08/21 1655   Wound Surface Area (cm^2) 3 cm^2 05/08/21 1655   Wound Volume (cm^3) 0 cm^3 05/08/21 1655   Wound Healing % 100 05/08/21 1655   Wound Bed Granulation (%) 80 % 11/14/20 1100   Tunneling (cm) 0 cm 04/15/21 1356   Tunneling Clock Position of Wound 12 12/11/20 1130   Undermining (cm) 0 cm 04/15/21 1356   Shape oval 05/08/21 1655   Wound Odor None 05/08/21 1655   Pulses N/A 05/08/21 1655   Exposed Structures None 05/08/21 1655         Vascular:    SHAYAN:   No results found.    Lab Values:    Lab Results   Component Value Date/Time    WBC 14.0 (H) 04/21/2021 01:53 AM    RBC 4.91 04/21/2021 01:53 AM    HEMOGLOBIN 14.0 04/21/2021 01:53 AM    HEMATOCRIT 43.8 04/21/2021 01:53 AM    CREACTPROT 7.52 (H) 11/29/2020 01:25 AM    SEDRATEWES 13 10/23/2018 08:02 AM    HBA1C 5.2 02/14/2021  01:30 AM      Culture Results show:  No results found for this or any previous visit (from the past 720 hour(s)).    Pain Level/Medicated:  Denies       INTERVENTIONS BY WOUND TEAM:  Chart and images reviewed. Discussed with bedside RN. All areas of concern (based on picture review, LDA review and discussion with bedside RN) have been thoroughly assessed. Documentation of areas based on significant findings. This RN in to assess patient. Performed standard wound care which includes appropriate positioning, dressing removal and non-selective debridement. Pictures and measurements obtained weekly if/when required.              ABD:  Preparation for Dressing removal: None   Cleansed with:  cleanser and gauze  Sharp debridement: NA  Joya wound: Cleanse with cleanser and gauze  Primary Dressing: Moistened arti (collagen), hydrofera blue,   Secondary (Outer) Dressing:  Secured with hypafix tape    Interdisciplinary consultation: Patient, Bedside RN    EVALUATION / RATIONALE FOR TREATMENT:  Most Recent Date:  5/8: wound nearly healed. Does not require advanced wound care. Wound team signing off and nursing to re consult if site worsens. Continue with dressing care per order.    4/21: abd wound improving. Nursing to continue dsg changes per order.    4/15: Right posterior thigh DTI now resolved. Pink and blanching. Abdominal wound to surface level continuing with dressing POC.    4/7/21: Thigh wound nearly healed and will likely be resolved at next assessment. Abdomen wound appears larger. Continued with Arti to move through inflammatory phase. Hydrofera blue for its bacteriostatic properties and to assist in drainage management.     03/31/21: thigh wound healing well now to a stage 2.  Continue current plan.  All interventions in place.   ABD more denuded and bloody.  Arti to assist in moving wound through inflammatory phase.   3/23/21: Right posterior thigh wound remains a DTI but appears to be evolving into a stage  2. Wound team was previously consulted regarding sacral wound which appeared to be a skin tear however skin now appears to be intact however discolored. Does not appear to be pressure related as pt is on a waffle, self mobile and sacral mepilex is in use. Area is also not a normal pressure injury shape. Wound team will continue to follow.   3/18: Pt with linear DTIs to R posterior thigh from lying on top of frye cath tubing. Offloading precautions ordered. Wound team to continue following.     Goals: Steady decrease in wound area and depth weekly.    WOUND TEAM PLAN OF CARE ([X] for frequency of wound follow up,):   Nursing to follow orders written for wound care. Contact wound team if area fails to progress, deteriorates or with any questions/concerns  Dressing changes by wound team:                   Follow up 3 times weekly:                NPWT change 3 times weekly:     Follow up 1-2 times weekly:     Follow up Bi-Monthly:                   Follow up as needed: x Nursing to manage.    Other (explain):     NURSING PLAN OF CARE ORDERS (X):  Dressing changes: See Dressing Care orders:   Skin care: See Skin Care orders: x  RN Prevention Protocol: x  Rectal tube care: See Rectal Tube Care orders:   Other orders:        Anticipated discharge plans:   LTACH:        SNF/Rehab:                  Home Health Care:           Outpatient Wound Center:            Self/Family Care:        Other:   Group Home

## 2021-05-08 NOTE — CARE PLAN
Problem: Knowledge Deficit  Goal: Knowledge of disease process/condition, treatment plan, diagnostic tests, and medications will improve  Outcome: PROGRESSING AS EXPECTED  Note: Plan of care for the evening was discussed with patient. Questions answered to patient satisfaction.     Problem: Psychosocial Needs:  Goal: Level of anxiety will decrease  Outcome: PROGRESSING AS EXPECTED  Note: Patient denied experiencing thoughts of suicide at the beginning of this shift.

## 2021-05-08 NOTE — PROGRESS NOTES
Hospital Medicine Twice Weekly Progress Note    Date of Service  5/8/2021      Chief Complaint  Suicidal ideation    Hospital Course  Mr. Castro is a 56 year-old man with a past medical history significant for atrial fibrillation, secondary hypercoagulable state, diabetes mellitus complicated by diabetic neuropathy, chronic systolic CHF, colostomy placement, orthostatic hypotension (maintained on florinef), and dyslipidemia who presented to the ED on 2/9/2021 after he became dizzy and had a syncopal episode at home.  In the ED he was noted to be hypotensive and bradycardic. Cardiology was consulted and recommended stopping all AV sanket blocking medications.  He remained orthostatic during his hospitalization so midodrine was started.  An echocardiogram was obtained and showed mild mitral regurgitation and an EF of 60% which was improved from his previous echocardiogram obtained on 11/2020 (previously 35%).  CTAs of the head and neck were done and were unremarkable outside of distal bilateral ICA tortuosity.  Also during his hospitalization he expressed desire to kill himself.  He has a colostomy and has become overwhelmed by caring for it.  He was subsequently placed on a legal hold and psychiatry was consulted.  He is now pending [facility] placement at the 6-month db for maturity of his colostomy.    Interval Problem Update  4/20- Patient's civil commitment was discontinued this morning.  Received message from nursing that patient again stating suicidal thoughts to staff.  On discussion with patient he states he is not suicidal at this time and has no intention of hurting himself, that he is just depressed related to his colostomy and having trouble dealing with his medical issues.  Patient reassured provider that he is not suicidal at this time.  Patient encouraged to focus on positive thoughts.  Nursing contacted psychiatry who state legal hold is not necessary at this time.   4/21- Patient again stating he is  "having suicidal ideations and expressed to nursing that he has a plan to slit his throat. Discussed patient with Psychiatry and agree with her assessment that patient makes statements but his actions are not coinciding with his expressions.  Went to bedside and discussed with patient his comments.  Patient directly stated \" I am having those suicidal ideations again\".  Asked patient point blank if he wanted to live at which he stated \" yes I want to live\".  Talked at length with patient regarding possible thoughts that would enter his head and ability to focus on positive aspects.  At end of conversation patient states he had no intention of committing suicide and will focus on getting well.  Questionable secondary gain as motive for daily expression of suicidal ideation.   4/24- Patient resting in bed, states he continues to have suicidal thoughts because he is upset at having a colostomy, but then states he wants to live and get better. PT  Re-evaluated patient and feel he is at baseline and ok to return to group home. Will discuss with case management on availability to return to former living facility. Discontinued midodrine, blood pressure remaining elevated with lower dose metoprolol.   4/27: Patient was placed back on legal hold.  Legal hold paperwork is filled out and psych facility transfer is pending.  4/28: Psych facility transfer is pending.  I will discuss with surgery whether to reverse his colostomy bag is a possibility as inpatient.  4/29: No acute issues per patient today.  We will continue to evaluate for psych facility.  4/30 seconds extended legal hold.  Continue to find psych facility for placement.  This may take a while since he has a colostomy bag and the facility will take him after 6 months since has been placed.  5/1: No acute issues today  5/2: No acute issues today and will continue to wait for placement.  5/3: Legal hold discontinued by psychiatry as they believe it was due to secondary " gain rather than suicide intent.  We will find placement for this patient now.  5/4: Patient has no complaints when I examined the patient.  Later on patient did mention suicide without intent or a plan to the nursing staff it appears that he does use this as a form of malingering when he wants attention.  I did discuss with the patient that we will try to find a group home form.  Blood sugar well controlled glucose 117.  Continue current insulin regimen  T-max 97.9, /89.  5/5: Patient again endorsed to nursing staff that he had suicidal ideations.  However when I meet with the patient he reports that he really does not have suicidal ideations.  I asked him why he had made that comment he reports that he had been feeling lonely and wanted to have attention from the staff.  I discussed with him that if he is feeling lonely and would like to have someone stop by to say hi he just needs to let the staff know instead of reporting suicidal ideations, unless he really is having suicidal ideations.  Patient adamantly denies that he is having suicidal ideations.  Continuing to work with patient to find a group home.  Nursing is working with the patient so that he can change his ostomy on his own.  Glucose is 106.  T-max 97, /55  5/6: Patient has no complaints this time.  Nurse reports no overnight events.  Patient ostomy is working well.  Vital signs stable.  Glucoses 113.  5/7: Patient periodically tells nursing staff due to suicidal ideations however discussion with the patient he denies any suicidal ideations and reports that he was wanting attention.  Reemphasized to the patient that reporting suicidal ideations are not true is an appropriate and that if he is needing something he just needs to ask patient reports understanding.  Continuing telemetry sitter.  Glucose well controlled 104 continue current management  T-max 98, /68.  5/8: Glucose 103.  He has no complaints.  Reports that he is doing  better working with his colostomy.  Feels that he could handle it at a group home.  T-max 98.1, /75.    Consultants/Specialty  Cardiology  Psychiatry  General surgery    Code Status  Full Code    Disposition  TBD- Civil commitment discontinued-working on discharge with case management    Review of Systems  Review of Systems   Constitutional: Negative for fever and malaise/fatigue.   Respiratory: Negative for cough and shortness of breath.    Cardiovascular: Negative for chest pain and leg swelling.   Gastrointestinal: Negative for abdominal pain, constipation, diarrhea, nausea and vomiting.        Colostomy in place    Neurological: Negative for weakness.   Psychiatric/Behavioral: Negative for depression and suicidal ideas.   All other systems reviewed and are negative.     Physical Exam  Temp:  [36.2 °C (97.1 °F)-36.7 °C (98.1 °F)] 36.4 °C (97.5 °F)  Pulse:  [61-96] 77  Resp:  [16-18] 17  BP: (117-135)/(58-76) 117/58  SpO2:  [93 %-96 %] 96 %    Physical Exam  Vitals and nursing note reviewed.   Constitutional:       General: He is awake. He is not in acute distress.     Appearance: Normal appearance. He is well-developed. He is not ill-appearing.   HENT:      Head: Normocephalic and atraumatic.      Mouth/Throat:      Lips: Pink.      Mouth: Mucous membranes are moist.   Eyes:      General: Lids are normal.      Conjunctiva/sclera: Conjunctivae normal.      Pupils: Pupils are equal, round, and reactive to light.   Cardiovascular:      Rate and Rhythm: Normal rate and regular rhythm.      Pulses: Normal pulses.      Heart sounds: Normal heart sounds.   Pulmonary:      Effort: Pulmonary effort is normal. No respiratory distress.      Breath sounds: Normal breath sounds. No decreased breath sounds or wheezing.   Abdominal:      General: Bowel sounds are normal. There is no distension.      Palpations: Abdomen is soft.      Tenderness: There is no abdominal tenderness. There is no guarding.       Musculoskeletal:       Cervical back: Neck supple.      Right lower leg: No edema.      Left lower leg: No edema.   Skin:     General: Skin is warm and dry.      Coloration: Skin is pale.   Neurological:      General: No focal deficit present.      Mental Status: He is alert and oriented to person, place, and time.      Cranial Nerves: No cranial nerve deficit.   Psychiatric:         Attention and Perception: Attention and perception normal.         Mood and Affect: Affect normal. Mood is depressed.         Speech: Speech normal.         Behavior: Behavior normal. Behavior is cooperative.         Thought Content: Thought content does not include suicidal ideation. Thought content does not include suicidal plan.         Cognition and Memory: Memory normal.      Comments: Patient states depressed secondary to colostomy and further need for it, stated to provider that he has no intention of harming himself and is not suicidal at this time.          Fluids    Intake/Output Summary (Last 24 hours) at 5/8/2021 1341  Last data filed at 5/8/2021 0000  Gross per 24 hour   Intake 240 ml   Output 550 ml   Net -310 ml     Laboratory          Imaging  [id number] VIEW   Final Result         No significant interval change.      DX-CHEST-LIMITED (1 VIEW)   Final Result         Diffuse interstitial prominence could relate to mild pulmonary edema or atypical infection      [id number] VIEW   Final Result      Increased colonic gas without definite bowel obstruction.      US-RENAL   Final Result      No evidence of hydronephrosis.      Lobulated kidneys bilaterally.      CT-ABDOMEN-PELVIS WITH   Final Result      1.  There is no evidence of small bowel obstruction.   2.  There is a left lower quadrant ostomy.   3.  There is fluid distention of the colon distal to the surgical material in the left mid descending colon extending all the way to the rectum. There is no pneumatosis or free air.   4.  There is cholelithiasis without biliary dilatation.  "  5.  There has been interval removal of the drainage catheter anterior to the spleen with minimal hypodense area in the anterior spleen again noted. There is no new fluid collection.      IR-US GUIDED PIV   Final Result    Ultrasound-guided PERIPHERAL IV INSERTION performed by    qualified nursing staff as above.      EC-ECHOCARDIOGRAM COMPLETE W/O CONT   Final Result      DX-LUMBAR SPINE-2 OR 3 VIEWS   Final Result      Moderate compression deformity of T11 is new compared to 2018.      Mild wedge deformity of T12 is unchanged.      Degenerative changes including facet arthropathy.      Mild retrolisthesis of L5 on S1 and L3 on L4.            Assessment/Plan  * Suicidal ideation- (present on admission)  Assessment & Plan  -Has history of MDD, recurrent with psychotic features. active SI w/ plan on admission  -Psychiatry following, recommended discontinuation of civil commitment - suspect possible malingering or secondary gain   - Evaluated bedside again and discussed with patient who states he \"wants to live\"   -Psych recommending sitter to ensure patient does not attempt actions that would extend his stay   5/5: Patient reports that he is telling staff that his suicidal ideations because he would like attention and not because he is actually having suicidal ideations.    Orthostatic hypotension- (present on admission)  Assessment & Plan  -Multifactorial including adrenal insufficiency, medications (beta blocker, CCB, tamsulosin), diabetic autonomic dysfunction, and venous insufficiency.  -EF is noted to be 60%, significant improvement from prior (previously 35%).  -Per cardiology, no further cardiac work-up needed.  Recommends follow-up with them as outpatient.  -Fall precautions.  -Continue to educate on the importance of slow positional changes.   - Started PO hydrocortisone 4/7/2021 for AI. Continuing florinef.   -Decreased toprolol which allowed for discontinuation of midodrine  Improved     Paroxysmal atrial " fibrillation (HCC)- (present on admission)  Assessment & Plan  -CHADSVASC 3 (HTN, CHF, DM).   -TTE Feb 2021: EF 60%.  -Continue metoprolol, digoxin, and eliquis.    Chronic heart failure with preserved ejection fraction (HCC)- (present on admission)  Assessment & Plan  -Now stable.  -Pulmonary edema noted in mid-March, was placed on IV diuresis though that was held when he developed a gram-negative gina bacteremia and sepsis.  Currently doing well from a respiratory standpoint, no shortness of breath, lungs clear to auscultation bilaterally, not requiring oxygen supplementation.  -Repeat echocardiogram showed improvement in his ejection fraction to 60%.    -Continue metoprolol succinate.  -Lisinopril held per cardiology recommendations due to hypotension.    Diabetes mellitus type 2, insulin dependent (HCC)- (present on admission)  Assessment & Plan  -A1c 5.2% on 2/14/2021.  -Continue diabetic diet.  -Continue lantus at current dose   -Hypoglycemia protocol in place if needed.      Major depressive disorder, recurrent, severe with psychotic features (HCC)- (present on admission)  Assessment & Plan  -Management as listed under 'anxiety and depression'.    Anxiety and depression- (present on admission)  Assessment & Plan  -Legal hold extended, awaiting acceptance at psych facility ([facility] will take in May).  -Remains expressing suicidal ideations but actions contradict.   -Psychiatry signed off feel he is malingering   -Continue paxil, klonopin, & geodon.     Hypothyroidism- (present on admission)  Assessment & Plan  -TFTs in Feb were WNL. Continue levothyroxine at current dose.     Adrenal insufficiency (HCC)- (present on admission)  Assessment & Plan  -Started PO hydrocortisone 4/7/2021. Continue florinef.   -Needs outpatient endocrinology follow up.  -Monitor potassium while on florinef. 4.4 on 4/7/2021.  -Repeat orthostatics periodically if symptomatic         Obstructive uropathy- (present on admission)  Assessment  & Plan  -Trial off of flomax given his hypotension.  Discontinued 4/6/2021. No retention continue off Flomax    Chronic venous insufficiency of lower extremity- (present on admission)  Assessment & Plan  -Continue compression stockings.    Colostomy present (HCC)- (present on admission)  Assessment & Plan  -History of fall at home Nov 2020 on left side with perforated colon and splenic fluid collection/hematoma s/p segmental colectomy, colostomy, mobilization of the splenic flexure, wound vac, and I&D Nov 2020.  -Wound care consulted for ostomy care, but he was non-participatory. He is now engaging in ostomy care with his bedside RNs, encouraged to be observant but do it independently.  -Patient must be independently able to care for his ostomy before inpatient psychiatry will accept him.  Per surgery Dr. Desouza, ostomy reversal is high risk given patient's complexities. Surgery recommending ostomy reversal as an outpatient    Debility- (present on admission)  Assessment & Plan  -Cleared by PT/OT.  -Out of bed for each meal and ambulate in the hallways at least twice per shift.    Mixed hyperlipidemia- (present on admission)  Assessment & Plan  Lab Results   Component Value Date/Time    CHOLSTRLTOT 146 02/14/2021 01:30 AM    LDL 82 02/14/2021 01:30 AM    HDL 32 (A) 02/14/2021 01:30 AM    TRIGLYCERIDE 160 (H) 02/14/2021 01:30 AM   -Continue atorvastatin.     Lower limb amputation, great toe (HCC)- (present on admission)  Assessment & Plan  -History of, 2018.  Site well-healed.    Class 2 severe obesity with serious comorbidity and body mass index (BMI) of 35.0 to 35.9 in adult (HCC)- (present on admission)  Assessment & Plan  Body mass index is 35.19 kg/m².-Trending down.  -Could benefit from outpatient weight loss counseling which can be arranged through his PCP after hospital discharge.       VTE prophylaxis: Eliquis    I have performed a physical exam and reviewed and updated ROS and Plan today (5/8/2021). In  review of yesterday's note (5/7/2021), there are no changes except as documented above.

## 2021-05-08 NOTE — WOUND TEAM
"  [facility]   Inpatient Services   Established Ostomy Management/ troubleshooting  HPI: Reviewed  PMH: Reviewed   SH: Reviewed   Reason for Ostomy nurse consult:  Established colostomy        Objective: appliance intact. Tele sitter at bedside. Patient ready for wound team to watch him change appliance     Colostomy 11/10/20 Transverse LUQ (Active)   Wound Image      Stomal Appliance Assessment Clean;Dry;Intact;Changed    Stoma Assessment Pink    Stoma Shape Budded Less Than One Inch;Oval    Stoma Size (in) 1.25    Peristomal Assessment Clean;Dry;Intact    Mucocutaneous Junction Intact    Treatment Appliance Changed;Bag Change;Cleansed with water/washcloth;Site care;Crusted with stoma powder    Peristomal Protectant Paste Ring;Stoma Powder;No Sting Skin Prep    Stomal Appliance 2 1/4\" (57mm) CTF;Paste Ring, 2\"    Output (mL) 100 mL    Output Color Brown    WOUND RN ONLY - Stomal Appliance  2 Piece;Paste Ring, 2\";2 1/4\" Moldable    Appliance (Pouch) # [id number]    Appliance Brand [id number]    [facility]    Secure Start completed Yes    Ostomy Care Resources Provided [facility] Tip Sheet    WOUND NURSE ONLY - Time Spent with Patient (mins) 45           Ostomy Appliance (type and size): 2 1/4\" Formaflex barrier with pouch and Paste Ring,      Interventions and Education: Patient completed entire change himself with very MINIMAL verbal prompts from this wound RN. Patient removed appliance. Pt cleansed peristomal skin and stoma with moist warm wash cloth. Patient applied stoma powder, dusted, then applied no sting skin prep. Pt stretched barrier to fit. Then patient attached pouch to barrier. Pt applied paste ring to peristoma around stoma. Then placed barrier with bag already attached in place. Pt then closed pouch end.    Evaluation:   5/8/21: patient in pleasant mood and agreeable to doing entire change himself again. Less verbal prompts used than last change, patient completed entire change " himself. Only had issues opening certain packages, otherwise did a fantastic job. Much easier for patient to attach bag prior to applying appliance. Wound team to continue to follow until patient completely confident in his own changes.     5/5/21: Patient in pleasant mood today and very willing to do entire change himself. Needed some verbal prompts and reminders but otherwise patient completed entire change himself. Will attempt to see patient again in a few days so he can get more comfortable doing entire hands on himself.     4/25: Pt very down today. Pt able to do some steps but needs help from RN still. Pt requested that MD reconsiders takedown after pt attempted to wrap light cord around his neck. Updated bedside RN to discuss with MD.     4/21: Pt continuing with hands on care. Expressed some difficulty attaching pouch, however, performs well with formaflex barrier.    4/15: used formaflex (moldable) barrier today which pt prefers to use. Pt stated he needs another education session because he needs more practice. Ostomy RN will continue to meet with pt.    4/7/21:  Pt reports he has been burping pouch and is capable of changing appliance. Pt does have difficulty cutting barrier. May be able to transition to moldable at next change. Pt appears in better spirits today. Ostomy RN to remain available for education.     03/31/21: patient capable of changing ostomy but still having emotional difficulty with it.  Wound team will continue to follow 1-2 times weekly.  Patient not appropriate for surgical reversal at this time.         3/15/21:  Pt being transferred to T8 related to bacteremia and allergic reaction to zosyn overnight. Appliance working appropriately. Skin intact. Will plan to sign off and be available PRN.     3/11/21: Patient formed own formaflex barrier pouch and paste ring, assisted in cleansing. Feeling better and willing to continue to get more hands on. Likes the formaflex because he is  actually able to form it to his size. Still has not performed independently.      3/8/21: Minimal participation, he attempts to do the steps with his eyes closed and minimal effort with his hands. Not sure if he lacks the fine motor movement or just going through the motions due to his expressed hatred of the colostomy. Patient has yet to perform all steps independently.     3/5/21: Pt did not participate in education today. Pt answered questions appropriately, but remained with his eyes closed through the duration of ostomy care. Declined hands on education when offered. Reported he was not feeling good. Formaflex barrier brought in today for pt as these do no require cutting with scissors. Extra barrier left in bag. CT of abd obtained yesterday did not reveal obstruction.     3/1/21: Viable stoma, producing stool. Per CNA report at bedside, appliance was leaking earlier and CNA changed appliance. Patient observed all steps today and participated with hands on care and did well. Discussed with MD on possibility for reversal. Per surgery, reversal of ostomy is an outpatient procedure that he will have to follow-up with in the future after discharge     2/26/21: Pt very down today, reports that psych has not seen him. Pt kept turning head away from ostomy today but did verbalized he will try to do hands on Sunday.      2/24/21: Pt unable to transfer to inpatient psych until he can care for colostomy independently. Ostomy RNs were asked to return for education. Pt extremely depressed today. Pt states he feels like an animal and that the colostomy is messy and smelly and he would rather die then care for it. This RN provided understanding and attempted to explain that colostomy is manageable. Stoma size is done changing and therefore pt can use template in ostomy bag to trace and cut barrier and apply paste ring prior to removing old appliance to limit time stoma is exposed as pt has extreme body dysmorphia related to  ostomy. Pt had colostomy created on November 10, 2020 by Dr. Desouza. May want to consider contacting surgery for possible reversal related to pts extreme body dysmorphia (this is pts 2nd admit for SI related to colostomy) and inability for pt to DC due to pts inability/unwillingness to care for colostomy - Discussed with Dr. Ley ([facility] Resident).      Plan: Wound team to continue with teaching every 3-4 days to help teach patient how to care for appliance so that he can discharge to psych.     Anticipated discharge needs: patient would benefit from SNF, outpatient clinic or Home health for follow up care. Patient needs to follow up with Dr. Desouza after discharge to plan for ostomy reversal surgery.

## 2021-05-09 LAB — GLUCOSE BLD-MCNC: 100 MG/DL (ref 65–99)

## 2021-05-09 PROCEDURE — 700102 HCHG RX REV CODE 250 W/ 637 OVERRIDE(OP): Performed by: STUDENT IN AN ORGANIZED HEALTH CARE EDUCATION/TRAINING PROGRAM

## 2021-05-09 PROCEDURE — 770001 HCHG ROOM/CARE - MED/SURG/GYN PRIV*

## 2021-05-09 PROCEDURE — 700102 HCHG RX REV CODE 250 W/ 637 OVERRIDE(OP): Performed by: NURSE PRACTITIONER

## 2021-05-09 PROCEDURE — A9270 NON-COVERED ITEM OR SERVICE: HCPCS | Performed by: HOSPITALIST

## 2021-05-09 PROCEDURE — 90837 PSYTX W PT 60 MINUTES: CPT | Performed by: PSYCHOLOGIST

## 2021-05-09 PROCEDURE — A9270 NON-COVERED ITEM OR SERVICE: HCPCS | Performed by: STUDENT IN AN ORGANIZED HEALTH CARE EDUCATION/TRAINING PROGRAM

## 2021-05-09 PROCEDURE — A9270 NON-COVERED ITEM OR SERVICE: HCPCS | Performed by: NURSE PRACTITIONER

## 2021-05-09 PROCEDURE — 82962 GLUCOSE BLOOD TEST: CPT

## 2021-05-09 PROCEDURE — 99231 SBSQ HOSP IP/OBS SF/LOW 25: CPT | Performed by: STUDENT IN AN ORGANIZED HEALTH CARE EDUCATION/TRAINING PROGRAM

## 2021-05-09 PROCEDURE — 700102 HCHG RX REV CODE 250 W/ 637 OVERRIDE(OP): Performed by: HOSPITALIST

## 2021-05-09 RX ADMIN — FLUDROCORTISONE ACETATE 0.1 MG: 0.1 TABLET ORAL at 04:57

## 2021-05-09 RX ADMIN — PAROXETINE HYDROCHLORIDE 20 MG: 20 TABLET, FILM COATED ORAL at 09:20

## 2021-05-09 RX ADMIN — LEVOTHYROXINE SODIUM 112 MCG: 0.11 TABLET ORAL at 04:57

## 2021-05-09 RX ADMIN — HYDROCORTISONE 5 MG: 10 TABLET ORAL at 15:22

## 2021-05-09 RX ADMIN — ZIPRASIDONE HYDROCHLORIDE 60 MG: 60 CAPSULE ORAL at 20:16

## 2021-05-09 RX ADMIN — DIGOXIN 125 MCG: 125 TABLET ORAL at 16:31

## 2021-05-09 RX ADMIN — ZIPRASIDONE HYDROCHLORIDE 60 MG: 60 CAPSULE ORAL at 09:20

## 2021-05-09 RX ADMIN — APIXABAN 5 MG: 5 TABLET, FILM COATED ORAL at 09:20

## 2021-05-09 RX ADMIN — HYDROCORTISONE 10 MG: 10 TABLET ORAL at 04:57

## 2021-05-09 RX ADMIN — SIMETHICONE 80 MG: 80 TABLET, CHEWABLE ORAL at 04:57

## 2021-05-09 RX ADMIN — ATORVASTATIN CALCIUM 40 MG: 40 TABLET, FILM COATED ORAL at 16:31

## 2021-05-09 RX ADMIN — SIMETHICONE 80 MG: 80 TABLET, CHEWABLE ORAL at 18:00

## 2021-05-09 RX ADMIN — INSULIN GLARGINE 5 UNITS: 100 INJECTION, SOLUTION SUBCUTANEOUS at 04:58

## 2021-05-09 RX ADMIN — CLONAZEPAM 0.5 MG: 0.5 TABLET ORAL at 09:20

## 2021-05-09 RX ADMIN — APIXABAN 5 MG: 5 TABLET, FILM COATED ORAL at 20:15

## 2021-05-09 RX ADMIN — CLONAZEPAM 1 MG: 1 TABLET ORAL at 16:31

## 2021-05-09 RX ADMIN — SIMETHICONE 80 MG: 80 TABLET, CHEWABLE ORAL at 11:53

## 2021-05-09 RX ADMIN — METOPROLOL SUCCINATE 12.5 MG: 25 TABLET, EXTENDED RELEASE ORAL at 04:57

## 2021-05-09 RX ADMIN — POTASSIUM CHLORIDE 20 MEQ: 20 TABLET, EXTENDED RELEASE ORAL at 04:57

## 2021-05-09 ASSESSMENT — ENCOUNTER SYMPTOMS
NAUSEA: 0
DIARRHEA: 0
WEAKNESS: 0
DEPRESSION: 0
ROS GI COMMENTS: COLOSTOMY IN PLACE
SHORTNESS OF BREATH: 0
ABDOMINAL PAIN: 0
VOMITING: 0
CONSTIPATION: 0
COUGH: 0
FEVER: 0

## 2021-05-09 NOTE — CARE PLAN
Treaded socks in place, bed in the lowest position, bed alarm on, call light and belongings within reach, pt call for assistance appropriately, tele-sitter in place.

## 2021-05-09 NOTE — PROGRESS NOTES
Hospital Medicine Twice Weekly Progress Note    Date of Service  5/9/2021      Chief Complaint  Suicidal ideation    Hospital Course  Mr. Castro is a 56 year-old man with a past medical history significant for atrial fibrillation, secondary hypercoagulable state, diabetes mellitus complicated by diabetic neuropathy, chronic systolic CHF, colostomy placement, orthostatic hypotension (maintained on florinef), and dyslipidemia who presented to the ED on 2/9/2021 after he became dizzy and had a syncopal episode at home.  In the ED he was noted to be hypotensive and bradycardic. Cardiology was consulted and recommended stopping all AV sanket blocking medications.  He remained orthostatic during his hospitalization so midodrine was started.  An echocardiogram was obtained and showed mild mitral regurgitation and an EF of 60% which was improved from his previous echocardiogram obtained on 11/2020 (previously 35%).  CTAs of the head and neck were done and were unremarkable outside of distal bilateral ICA tortuosity.  Also during his hospitalization he expressed desire to kill himself.  He has a colostomy and has become overwhelmed by caring for it.  He was subsequently placed on a legal hold and psychiatry was consulted.  He is now pending [facility] placement at the 6-month db for maturity of his colostomy.    Interval Problem Update  4/20- Patient's civil commitment was discontinued this morning.  Received message from nursing that patient again stating suicidal thoughts to staff.  On discussion with patient he states he is not suicidal at this time and has no intention of hurting himself, that he is just depressed related to his colostomy and having trouble dealing with his medical issues.  Patient reassured provider that he is not suicidal at this time.  Patient encouraged to focus on positive thoughts.  Nursing contacted psychiatry who state legal hold is not necessary at this time.   4/21- Patient again stating he is  "having suicidal ideations and expressed to nursing that he has a plan to slit his throat. Discussed patient with Psychiatry and agree with her assessment that patient makes statements but his actions are not coinciding with his expressions.  Went to bedside and discussed with patient his comments.  Patient directly stated \" I am having those suicidal ideations again\".  Asked patient point blank if he wanted to live at which he stated \" yes I want to live\".  Talked at length with patient regarding possible thoughts that would enter his head and ability to focus on positive aspects.  At end of conversation patient states he had no intention of committing suicide and will focus on getting well.  Questionable secondary gain as motive for daily expression of suicidal ideation.   4/24- Patient resting in bed, states he continues to have suicidal thoughts because he is upset at having a colostomy, but then states he wants to live and get better. PT  Re-evaluated patient and feel he is at baseline and ok to return to group home. Will discuss with case management on availability to return to former living facility. Discontinued midodrine, blood pressure remaining elevated with lower dose metoprolol.   4/27: Patient was placed back on legal hold.  Legal hold paperwork is filled out and psych facility transfer is pending.  4/28: Psych facility transfer is pending.  I will discuss with surgery whether to reverse his colostomy bag is a possibility as inpatient.  4/29: No acute issues per patient today.  We will continue to evaluate for psych facility.  4/30 seconds extended legal hold.  Continue to find psych facility for placement.  This may take a while since he has a colostomy bag and the facility will take him after 6 months since has been placed.  5/1: No acute issues today  5/2: No acute issues today and will continue to wait for placement.  5/3: Legal hold discontinued by psychiatry as they believe it was due to secondary " gain rather than suicide intent.  We will find placement for this patient now.  5/4: Patient has no complaints when I examined the patient.  Later on patient did mention suicide without intent or a plan to the nursing staff it appears that he does use this as a form of malingering when he wants attention.  I did discuss with the patient that we will try to find a group home form.  Blood sugar well controlled glucose 117.  Continue current insulin regimen  T-max 97.9, /89.  5/5: Patient again endorsed to nursing staff that he had suicidal ideations.  However when I meet with the patient he reports that he really does not have suicidal ideations.  I asked him why he had made that comment he reports that he had been feeling lonely and wanted to have attention from the staff.  I discussed with him that if he is feeling lonely and would like to have someone stop by to say hi he just needs to let the staff know instead of reporting suicidal ideations, unless he really is having suicidal ideations.  Patient adamantly denies that he is having suicidal ideations.  Continuing to work with patient to find a group home.  Nursing is working with the patient so that he can change his ostomy on his own.  Glucose is 106.  T-max 97, /55  5/6: Patient has no complaints this time.  Nurse reports no overnight events.  Patient ostomy is working well.  Vital signs stable.  Glucoses 113.  5/7: Patient periodically tells nursing staff due to suicidal ideations however discussion with the patient he denies any suicidal ideations and reports that he was wanting attention.  Reemphasized to the patient that reporting suicidal ideations are not true is an appropriate and that if he is needing something he just needs to ask patient reports understanding.  Continuing telemetry sitter.  Glucose well controlled 104 continue current management  T-max 98, /68.  5/8: Glucose 103.  He has no complaints.  Reports that he is doing  better working with his colostomy.  Feels that he could handle it at a group home.  T-max 98.1, /75.  5/9: Patient has no complaints and no overnight events.  He reports that he has been able to manage his colostomy on his own yesterday and feels that he is more comfortable.  Denies any suicidal ideations.  T-max 98.6, /86.  Glucose 100.    Consultants/Specialty  Cardiology  Psychiatry  General surgery    Code Status  Full Code    Disposition  TBD- Civil commitment discontinued-working on discharge with case management    Review of Systems  Review of Systems   Constitutional: Negative for fever and malaise/fatigue.   Respiratory: Negative for cough and shortness of breath.    Cardiovascular: Negative for chest pain and leg swelling.   Gastrointestinal: Negative for abdominal pain, constipation, diarrhea, nausea and vomiting.        Colostomy in place    Neurological: Negative for weakness.   Psychiatric/Behavioral: Negative for depression and suicidal ideas.   All other systems reviewed and are negative.     Physical Exam  Temp:  [35.7 °C (96.2 °F)-37 °C (98.6 °F)] 35.7 °C (96.2 °F)  Pulse:  [60-94] 60  Resp:  [16-20] 20  BP: (125-140)/(73-86) 132/73  SpO2:  [94 %-97 %] 97 %    Physical Exam  Vitals and nursing note reviewed.   Constitutional:       General: He is awake. He is not in acute distress.     Appearance: Normal appearance. He is well-developed. He is not ill-appearing.   HENT:      Head: Normocephalic and atraumatic.      Mouth/Throat:      Lips: Pink.      Mouth: Mucous membranes are moist.   Eyes:      General: Lids are normal.      Conjunctiva/sclera: Conjunctivae normal.      Pupils: Pupils are equal, round, and reactive to light.   Cardiovascular:      Rate and Rhythm: Normal rate and regular rhythm.      Pulses: Normal pulses.      Heart sounds: Normal heart sounds.   Pulmonary:      Effort: Pulmonary effort is normal. No respiratory distress.      Breath sounds: Normal breath sounds. No  decreased breath sounds or wheezing.   Abdominal:      General: Bowel sounds are normal. There is no distension.      Palpations: Abdomen is soft.      Tenderness: There is no abdominal tenderness. There is no guarding.       Musculoskeletal:      Cervical back: Neck supple.      Right lower leg: No edema.      Left lower leg: No edema.   Skin:     General: Skin is warm and dry.      Coloration: Skin is pale.   Neurological:      General: No focal deficit present.      Mental Status: He is alert and oriented to person, place, and time.      Cranial Nerves: No cranial nerve deficit.   Psychiatric:         Attention and Perception: Attention and perception normal.         Mood and Affect: Affect normal. Mood is depressed.         Speech: Speech normal.         Behavior: Behavior normal. Behavior is cooperative.         Thought Content: Thought content does not include suicidal ideation. Thought content does not include suicidal plan.         Cognition and Memory: Memory normal.      Comments: Patient states depressed secondary to colostomy and further need for it, stated to provider that he has no intention of harming himself and is not suicidal at this time.          Fluids    Intake/Output Summary (Last 24 hours) at 5/9/2021 1217  Last data filed at 5/9/2021 0900  Gross per 24 hour   Intake 477 ml   Output 800 ml   Net -323 ml     Laboratory          Imaging  ZS-[id number] VIEW   Final Result         No significant interval change.      DX-CHEST-LIMITED (1 VIEW)   Final Result         Diffuse interstitial prominence could relate to mild pulmonary edema or atypical infection      [id number] VIEW   Final Result      Increased colonic gas without definite bowel obstruction.      US-RENAL   Final Result      No evidence of hydronephrosis.      Lobulated kidneys bilaterally.      CT-ABDOMEN-PELVIS WITH   Final Result      1.  There is no evidence of small bowel obstruction.   2.  There is a left lower quadrant ostomy.  "  3.  There is fluid distention of the colon distal to the surgical material in the left mid descending colon extending all the way to the rectum. There is no pneumatosis or free air.   4.  There is cholelithiasis without biliary dilatation.   5.  There has been interval removal of the drainage catheter anterior to the spleen with minimal hypodense area in the anterior spleen again noted. There is no new fluid collection.      IR-US GUIDED PIV   Final Result    Ultrasound-guided PERIPHERAL IV INSERTION performed by    qualified nursing staff as above.      EC-ECHOCARDIOGRAM COMPLETE W/O CONT   Final Result      DX-LUMBAR SPINE-2 OR 3 VIEWS   Final Result      Moderate compression deformity of T11 is new compared to 2018.      Mild wedge deformity of T12 is unchanged.      Degenerative changes including facet arthropathy.      Mild retrolisthesis of L5 on S1 and L3 on L4.            Assessment/Plan  * Suicidal ideation- (present on admission)  Assessment & Plan  -Has history of MDD, recurrent with psychotic features. active SI w/ plan on admission  -Psychiatry following, recommended discontinuation of civil commitment - suspect possible malingering or secondary gain   - Evaluated bedside again and discussed with patient who states he \"wants to live\"   -Psych recommending sitter to ensure patient does not attempt actions that would extend his stay   5/5: Patient reports that he is telling staff that his suicidal ideations because he would like attention and not because he is actually having suicidal ideations.    Orthostatic hypotension- (present on admission)  Assessment & Plan  -Multifactorial including adrenal insufficiency, medications (beta blocker, CCB, tamsulosin), diabetic autonomic dysfunction, and venous insufficiency.  -EF is noted to be 60%, significant improvement from prior (previously 35%).  -Per cardiology, no further cardiac work-up needed.  Recommends follow-up with them as outpatient.  -Fall " precautions.  -Continue to educate on the importance of slow positional changes.   - Started PO hydrocortisone 4/7/2021 for AI. Continuing florinef.   -Decreased toprolol which allowed for discontinuation of midodrine  Improved     Paroxysmal atrial fibrillation (HCC)- (present on admission)  Assessment & Plan  -CHADSVASC 3 (HTN, CHF, DM).   -TTE Feb 2021: EF 60%.  -Continue metoprolol, digoxin, and eliquis.    Chronic heart failure with preserved ejection fraction (HCC)- (present on admission)  Assessment & Plan  -Now stable.  -Pulmonary edema noted in mid-March, was placed on IV diuresis though that was held when he developed a gram-negative gina bacteremia and sepsis.  Currently doing well from a respiratory standpoint, no shortness of breath, lungs clear to auscultation bilaterally, not requiring oxygen supplementation.  -Repeat echocardiogram showed improvement in his ejection fraction to 60%.    -Continue metoprolol succinate.  -Lisinopril held per cardiology recommendations due to hypotension.    Diabetes mellitus type 2, insulin dependent (HCC)- (present on admission)  Assessment & Plan  -A1c 5.2% on 2/14/2021.  -Continue diabetic diet.  -Continue lantus at current dose   -Hypoglycemia protocol in place if needed.      Major depressive disorder, recurrent, severe with psychotic features (HCC)- (present on admission)  Assessment & Plan  -Management as listed under 'anxiety and depression'.    Anxiety and depression- (present on admission)  Assessment & Plan  -Legal hold extended, awaiting acceptance at psych facility ([facility] will take in May).  -Remains expressing suicidal ideations but actions contradict.   -Psychiatry signed off feel he is malingering   -Continue paxil, klonopin, & geodon.     Hypothyroidism- (present on admission)  Assessment & Plan  -TFTs in Feb were WNL. Continue levothyroxine at current dose.     Adrenal insufficiency (HCC)- (present on admission)  Assessment & Plan  -Started PO  hydrocortisone 4/7/2021. Continue florinef.   -Needs outpatient endocrinology follow up.  -Monitor potassium while on florinef. 4.4 on 4/7/2021.  -Repeat orthostatics periodically if symptomatic         Obstructive uropathy- (present on admission)  Assessment & Plan  -Trial off of flomax given his hypotension.  Discontinued 4/6/2021. No retention continue off Flomax    Chronic venous insufficiency of lower extremity- (present on admission)  Assessment & Plan  -Continue compression stockings.    Colostomy present ([facility])- (present on admission)  Assessment & Plan  -History of fall at home Nov 2020 on left side with perforated colon and splenic fluid collection/hematoma s/p segmental colectomy, colostomy, mobilization of the splenic flexure, wound vac, and I&D Nov 2020.  -Wound care consulted for ostomy care, but he was non-participatory. He is now engaging in ostomy care with his bedside RNs, encouraged to be observant but do it independently.  -Patient must be independently able to care for his ostomy before inpatient psychiatry will accept him.  Per surgery Dr. Desouza, ostomy reversal is high risk given patient's complexities. Surgery recommending ostomy reversal as an outpatient    Debility- (present on admission)  Assessment & Plan  -Cleared by PT/OT.  -Out of bed for each meal and ambulate in the hallways at least twice per shift.    Mixed hyperlipidemia- (present on admission)  Assessment & Plan  Lab Results   Component Value Date/Time    CHOLSTRLTOT 146 02/14/2021 01:30 AM    LDL 82 02/14/2021 01:30 AM    HDL 32 (A) 02/14/2021 01:30 AM    TRIGLYCERIDE 160 (H) 02/14/2021 01:30 AM   -Continue atorvastatin.     Lower limb amputation, great toe (HCC)- (present on admission)  Assessment & Plan  -History of, 2018.  Site well-healed.    Class 2 severe obesity with serious comorbidity and body mass index (BMI) of 35.0 to 35.9 in adult (HCC)- (present on admission)  Assessment & Plan  Body mass index is 35.19  kg/m².-Trending down.  -Could benefit from outpatient weight loss counseling which can be arranged through his PCP after hospital discharge.       VTE prophylaxis: Eliquis    I have performed a physical exam and reviewed and updated ROS and Plan today (5/9/2021). In review of yesterday's note (5/8/2021), there are no changes except as documented above.

## 2021-05-09 NOTE — CARE PLAN
Problem: Safety  Goal: Will remain free from falls  Outcome: PROGRESSING AS EXPECTED   Pt uses call light appropriately when in need of assistance. Fall precautions in place, bed in lowest position, call light within reach.   Problem: Bowel/Gastric:  Goal: Normal bowel function is maintained or improved  Outcome: PROGRESSING AS EXPECTED   Pt actively participating in care for ostomy.

## 2021-05-09 NOTE — CONSULTS
"PSYCHOLOGICAL FOLLOW-UP:  Reason for admission: Syncope and collapse [R55]  Suicidal ideation [R45.851]  Sepsis due to Klebsiella pneumoniae (HCC) [A41.4]  Length of Visit: 60min    Legal status: Legal Status: Involuntary Placed on a legal hold by end of session    Chief Complaint: \"I am going to take my colostomy out and stick something in there to make myself bleed out.\"    HPI: Met with the patient to assess risk and provide crisis intervention. Patient presented with a somewhat depressed affect and reported a \"not good\" mood. Once again, he is stating he is suicidal with a plan and intent to commit suicide. Patient stated that he plans to take his colostomy out and stick something inside of himself to cause himself to bleed out. Attempted to engage in crisis intervention by identifying coping skills he could use to avoid acting upon the thoughts and different ways he could keep himself safe however he was adamant that he would do something to kill himself. Explained what would happen if he was placed back on a legal hold including that this writer would recommend that he absolutely be transferred to an inpatient psychiatric facility and his entire room would be stripped. The patient continued to endorse intent to commit suicide. Asked the patient what he thought inpatient psychiatric treatment would do for him and he stated, \"it would get rid of these thoughts and help me feel more confident.\" Explained that he would not get the psychotherapy he had received here in the inpatient setting and that it was mainly focused on psychiatric stabilization. Encouraged him again to use the skills discussed in the past with this writer numerous times to direct attention away from the thoughts and onto things he can do to get through the painful emotion. The patient continued to state his intent to attempt suicide in the hospital and again identified the reason as being is ostomy bag and a perceived negative interaction with " "nursing. These are reasons he has stated in the past as well. He either could not or would not remember the distress tolerance skills he has used in the past to cope with his suicidality.     Informed the patient that I would be placing him on a legal hold and that his room would be stripped of anything he could hurt himself. Also informed the patient that he would be referred to an inpatient psychiatric facility.     Psychiatric Examination:  Vitals: Blood pressure 132/73, pulse 60, temperature (!) 35.7 °C (96.2 °F), temperature source Temporal, resp. rate 20, height 1.829 m (6'), weight 116 kg (256 lb 13.4 oz), SpO2 97 %.  Musculoskeletal: psychomotor agitation noted. Shaking legs   Appearance and Eye Contact: appropriate dress and grooming. Behavior is calm, cooperative,  appropriate eye contact  Attention/Alertness: Alert  Thought Process: Rumanitive    Thought Content: SI  Speech: Clear with normal rate and rhythm  Mood: \"not good\"            Affect: somewhat depressed however he was able to smile once         SI/HI: Endorses    Memory: Recent and remote memory appear possibly impaired. He is not retaining psychotherapy concepts.  Orientation: alert, oriented to person, place and time  Insight into symptoms: very poor  Judgement into symptoms:very poor    ASSESSMENT: While the patient has a recent history of stating suicidality without intent for the secondary gain of obtaining interpersonal contact, today he is adamant that he will attempt suicide and/or seriously injure himself by taking his ostomy bag out and sticking something inside himself to bleed out. He is unwilling to contract for safety and has made suicidal gestures in the past. He has also been inconsistent with his history of attempting suicide, telling some people that he has and others that he hasn't. He is not benefiting from psychotherapy nor does he seem to be retaining the information taught in psychotherapy. He appears to be experiencing " symptoms of a personality disorder in that he acts upon and responds to what he is feeling in the moment without insight into how it will impact him, negatively or positively, in the future. There is no evidence currently that he would not follow through with his stated plan if given the opportunity to do so.     Given the above as well as the realistic harm he could cause himself by going through with his plan (whether he did so for secondary gain and/or the desire to die), he is now at high risk of harming himself and/or attempting suicide. He currently meets criteria for a legal hold as a result of his mental illness. Legal hold initiated and extended. The hospitalist also already signed the medical clearance.     Please see below for the start of a behavioral plan for the patient. A complete behavior plan will be documented once finished.     DSM5 Diagnostic Considerations:   Unspecified Personality Disorder   -Avoidant? Borderline? Dependent?    Major Depressive Disorder, recurrent, severe, with psychotic features    PLAN: PLEASE READ    Legal status: Involuntary. Legal hold initiated and extended.     Tele-sitter with q15 checks. Upgrade to 1:1 sitter if he engages in any self-harming or suicidal behavior or gesture. Will reevaluate tomorrow.    Limit conversations with the patient to only as needed interactions as his suicidality can worsen with perceived negative interactions with staff.     No phone calls as his suicidality can worsen with perceived negative interactions with others    Maximum legal hold restrictions including the following:    -Strip room of anything he can hurt himself with including all cords. Do not leave anything in there that he could use to stick inside his body.    -Finger foods only    -Supervise ostomy bag changes and remove all supplies once he is done    -Place gloves on the patient or consider restraints if he attempts to pull the ostomy out and/or hurts himself in anyway      -Do not allow the patient to walk around the unit as he could grab something to hurt himself with while on that walk.     [facility] has said in the past that they will take him after he has had the ostomy for 6 months and demonstrates he can take care of it himself. He has now had it for 6 months and there is documented evidence that he is able to care for it himself. Please follow up with the hospital to see if they will accept him.     As his suicidality has been consistently related to his ostomy, consider reaching out to surgery again to request it be reversed if medically appropriate.     Anticipate F/U within 72 hours by a qualified mental health professional.      Records reviewed: yes    Discussed patient with other provider: yes, hospitalist    Will continue to develop behavioral plan to address suicidality and self-harm    Will continue to follow     Thank you for the consult.    Madhuri Fernandez, Ph.D.

## 2021-05-10 LAB — GLUCOSE BLD-MCNC: 102 MG/DL (ref 65–99)

## 2021-05-10 PROCEDURE — A9270 NON-COVERED ITEM OR SERVICE: HCPCS | Performed by: STUDENT IN AN ORGANIZED HEALTH CARE EDUCATION/TRAINING PROGRAM

## 2021-05-10 PROCEDURE — A9270 NON-COVERED ITEM OR SERVICE: HCPCS | Performed by: NURSE PRACTITIONER

## 2021-05-10 PROCEDURE — 700102 HCHG RX REV CODE 250 W/ 637 OVERRIDE(OP): Performed by: STUDENT IN AN ORGANIZED HEALTH CARE EDUCATION/TRAINING PROGRAM

## 2021-05-10 PROCEDURE — 99232 SBSQ HOSP IP/OBS MODERATE 35: CPT | Performed by: STUDENT IN AN ORGANIZED HEALTH CARE EDUCATION/TRAINING PROGRAM

## 2021-05-10 PROCEDURE — 700102 HCHG RX REV CODE 250 W/ 637 OVERRIDE(OP): Performed by: NURSE PRACTITIONER

## 2021-05-10 PROCEDURE — 700102 HCHG RX REV CODE 250 W/ 637 OVERRIDE(OP): Performed by: HOSPITALIST

## 2021-05-10 PROCEDURE — 770001 HCHG ROOM/CARE - MED/SURG/GYN PRIV*

## 2021-05-10 PROCEDURE — A9270 NON-COVERED ITEM OR SERVICE: HCPCS | Performed by: HOSPITALIST

## 2021-05-10 PROCEDURE — 90832 PSYTX W PT 30 MINUTES: CPT | Performed by: PSYCHOLOGIST

## 2021-05-10 PROCEDURE — 82962 GLUCOSE BLOOD TEST: CPT

## 2021-05-10 RX ADMIN — ZIPRASIDONE HYDROCHLORIDE 60 MG: 60 CAPSULE ORAL at 08:17

## 2021-05-10 RX ADMIN — ATORVASTATIN CALCIUM 40 MG: 40 TABLET, FILM COATED ORAL at 16:30

## 2021-05-10 RX ADMIN — SIMETHICONE 80 MG: 80 TABLET, CHEWABLE ORAL at 05:23

## 2021-05-10 RX ADMIN — SIMETHICONE 80 MG: 80 TABLET, CHEWABLE ORAL at 16:30

## 2021-05-10 RX ADMIN — LEVOTHYROXINE SODIUM 112 MCG: 0.11 TABLET ORAL at 05:23

## 2021-05-10 RX ADMIN — FLUDROCORTISONE ACETATE 0.1 MG: 0.1 TABLET ORAL at 05:23

## 2021-05-10 RX ADMIN — APIXABAN 5 MG: 5 TABLET, FILM COATED ORAL at 08:18

## 2021-05-10 RX ADMIN — DIGOXIN 125 MCG: 125 TABLET ORAL at 16:30

## 2021-05-10 RX ADMIN — CLONAZEPAM 0.5 MG: 0.5 TABLET ORAL at 08:17

## 2021-05-10 RX ADMIN — HYDROCORTISONE 10 MG: 10 TABLET ORAL at 05:23

## 2021-05-10 RX ADMIN — CLONAZEPAM 1 MG: 1 TABLET ORAL at 16:30

## 2021-05-10 RX ADMIN — ZIPRASIDONE HYDROCHLORIDE 60 MG: 60 CAPSULE ORAL at 20:25

## 2021-05-10 RX ADMIN — PAROXETINE HYDROCHLORIDE 20 MG: 20 TABLET, FILM COATED ORAL at 08:18

## 2021-05-10 RX ADMIN — POTASSIUM CHLORIDE 20 MEQ: 20 TABLET, EXTENDED RELEASE ORAL at 05:23

## 2021-05-10 RX ADMIN — APIXABAN 5 MG: 5 TABLET, FILM COATED ORAL at 20:25

## 2021-05-10 RX ADMIN — HYDROCORTISONE 5 MG: 10 TABLET ORAL at 16:31

## 2021-05-10 RX ADMIN — METOPROLOL SUCCINATE 12.5 MG: 25 TABLET, EXTENDED RELEASE ORAL at 05:23

## 2021-05-10 RX ADMIN — INSULIN GLARGINE 5 UNITS: 100 INJECTION, SOLUTION SUBCUTANEOUS at 05:26

## 2021-05-10 ASSESSMENT — ENCOUNTER SYMPTOMS
DIARRHEA: 0
SHORTNESS OF BREATH: 0
VOMITING: 0
DEPRESSION: 0
WEAKNESS: 0
NAUSEA: 0
CONSTIPATION: 0
ROS GI COMMENTS: COLOSTOMY IN PLACE
ABDOMINAL PAIN: 0
COUGH: 0
FEVER: 0

## 2021-05-10 NOTE — DISCHARGE PLANNING
Anticipated Discharge Disposition: Group Home    Action: pt pending assignment to new rep payee due to [place] terminating services with pt.    Barriers to Discharge: SNF acceptance, rep payee establishment    Plan: f/u with medical team, f/u with pt to assess process of new rep payee, f/u with [facility] for SNF acceptance

## 2021-05-10 NOTE — CONSULTS
"PSYCHOLOGICAL FOLLOW-UP:  Reason for admission: Syncope and collapse [R55]  Suicidal ideation [R45.851]  Sepsis due to Klebsiella pneumoniae (HCC) [A41.4]  Length of Visit: 30min    Legal status: Legal Status: Involuntary    Chief Complaint: \"I'm not suicidal today.\"    HPI: Met with the patient to assess risk level and provide crisis intervention services. Patient presented with an anxious affect but reported a \"better\" mood. Today, he denies suicidal thoughts, plans, and intent and states that he \"had a good talking with myself and the man upstairs and I know what this is all about. (points to his ostomy bag) and I can take care of it so I dont think I need to be on the temporary hold anymore.\" He also stated, \"I shouldn't write checks that I cant cash.\" Its unclear what he meant by that last statement. Pointed out to the patient that he has a history of vacillating quickly between acute suicidality and feeling stable and that it had been less than 24 hours since he adamantly reported that he was unsafe and would try to kill himself if he was not on a legal hold. Expressed concern that the patient did not have a plan or the current skills to cope with the next time he experienced suicidal plans and intent due to distress with the ostomy and perceived interpersonal rejection. The patient was not able to identify what he would do differently the next time he experiences acute suicidality. He either could not remember or did not want to remember the coping skills discussed over many psychotherapy sessions. He then stated, \"you have known me long enough. don't you think I would have done something by now if I had wanted to?\" Explained to the patient that this was the exact reason he was placed on the hold as this writer took him at his word when he stated his intent to kill himself. The patient then asked for changes to his legal hold restrictions. Explained that they would be reassessed frequently based on his risk " "level. Also explained the need for this writer to see him cope with over time with his sucidality especially given the current stress he is experiencing. Agreed to meet again on Friday, 5/14/21, if he is still in the acute care setting.    Psychiatric Examination:  Vitals: Blood pressure 136/76, pulse 71, temperature 35.8 °C (96.5 °F), temperature source Temporal, resp. rate 18, height 1.829 m (6'), weight 116 kg (256 lb 13.4 oz), SpO2 94 %.  Musculoskeletal: anxious leg shaking noted  Appearance and Eye Contact: appropriate dress and grooming. Behavior is calm, cooperative,  appropriate eye contact  Attention/Alertness: Alert  Thought Process: Linear    Thought Content: No psychotic processes noted  Speech: Clear with normal rate and rhythm  Mood: \"better\"            Affect: incongruent with mood. anxious         SI/HI: Denies    Memory: Recent and remote memory appear possibly impaired   Orientation: alert, oriented to person, place and time  Insight into symptoms: very poor  Judgement into symptoms:very poor    ASSESSMENT: The patient continues to demonstrate poor insight and judgement into his psychological health and does not appear to be retaining skills learned in psychotherapy. He does not appear to have ever undergone a cognitive evaluation while at [facility]. Will consider performing or ordering one. The patient continues to make decisions based off how he feels in the moment without insight into how it will impact him in the future. He continues to meet criteria for a personality disorder and will need clear and consistent boundaries to aid in his ability to cope with the symptoms. As long as he does not have a concrete plan for how he will avoid attempting suicide when he feels acutely suicidal, he will remain at high risk. Will continue to evaluate risk level and need for legal hold. For now, it remains extended. Please transfer him to a psychiatric facility when able.     DSM5 Diagnostic Considerations: "   Unspecified Personality Disorder   -avoidant? Borderline?    Major Depressive Disorder, recurrent, severe with psychotic features      PLAN:  Legal status: Involuntary. Legal hold remains extended.      Tele-sitter with q15 checks. Upgrade to 1:1 sitter if he engages in any self-harming or suicidal behavior or gesture. Will reevaluate tomorrow.     Limit conversations with the patient to only as needed interactions as his suicidality can worsen with perceived negative interactions with staff.     Patient is allowed to have access to:     [employer]     TV without the call light    Maximum legal hold restrictions including the following:     -Strip room of anything he can hurt himself with including all cords. Do not leave anything in there that he could use to stick inside his body.     -Finger foods only     -Supervise ostomy bag changes and remove all supplies once he is done     -Place gloves on the patient or consider restraints if he attempts to pull the ostomy out and/or hurts himself in anyway      -Do not allow the patient to walk around the unit as he could grab something to hurt himself with while on that walk.      [facility] has said in the past that they will take him after he has had the ostomy for 6 months and demonstrates he can take care of it himself. He has now had it for 6 months and there is documented evidence that he is able to care for it himself. Please follow up with the hospital to see if they will accept him.      As his suicidality has been consistently related to his ostomy, consider reaching out to surgery again to request it be reversed if medically appropriate.      Anticipate F/U within 72 hours by a qualified mental health professional.       Records reviewed: yes     Discussed patient with other provider: yes, hospitalist     Will continue to develop behavioral plan to address suicidality and self-harm     Will continue to follow      Madhuri Fernandez, Ph.D.

## 2021-05-10 NOTE — CARE PLAN
Pt on a legal hold. Pt denies SI currently. Tele-sitter in place and q 15 min observation in place. Room stripped per protocol.

## 2021-05-10 NOTE — PROGRESS NOTES
Hospital Medicine Twice Weekly Progress Note    Date of Service  5/10/2021      Chief Complaint  Suicidal ideation    Hospital Course  Mr. Castro is a 56 year-old man with a past medical history significant for atrial fibrillation, secondary hypercoagulable state, diabetes mellitus complicated by diabetic neuropathy, chronic systolic CHF, colostomy placement, orthostatic hypotension (maintained on florinef), and dyslipidemia who presented to the ED on 2/9/2021 after he became dizzy and had a syncopal episode at home.  In the ED he was noted to be hypotensive and bradycardic. Cardiology was consulted and recommended stopping all AV sanket blocking medications.  He remained orthostatic during his hospitalization so midodrine was started.  An echocardiogram was obtained and showed mild mitral regurgitation and an EF of 60% which was improved from his previous echocardiogram obtained on 11/2020 (previously 35%).  CTAs of the head and neck were done and were unremarkable outside of distal bilateral ICA tortuosity.  Also during his hospitalization he expressed desire to kill himself.  He has a colostomy and has become overwhelmed by caring for it.  He was subsequently placed on a legal hold and psychiatry was consulted.  He is now pending [facility] placement at the 6-month db for maturity of his colostomy.    Interval Problem Update  4/20- Patient's civil commitment was discontinued this morning.  Received message from nursing that patient again stating suicidal thoughts to staff.  On discussion with patient he states he is not suicidal at this time and has no intention of hurting himself, that he is just depressed related to his colostomy and having trouble dealing with his medical issues.  Patient reassured provider that he is not suicidal at this time.  Patient encouraged to focus on positive thoughts.  Nursing contacted psychiatry who state legal hold is not necessary at this time.   4/21- Patient again stating he is  "having suicidal ideations and expressed to nursing that he has a plan to slit his throat. Discussed patient with Psychiatry and agree with her assessment that patient makes statements but his actions are not coinciding with his expressions.  Went to bedside and discussed with patient his comments.  Patient directly stated \" I am having those suicidal ideations again\".  Asked patient point blank if he wanted to live at which he stated \" yes I want to live\".  Talked at length with patient regarding possible thoughts that would enter his head and ability to focus on positive aspects.  At end of conversation patient states he had no intention of committing suicide and will focus on getting well.  Questionable secondary gain as motive for daily expression of suicidal ideation.   4/24- Patient resting in bed, states he continues to have suicidal thoughts because he is upset at having a colostomy, but then states he wants to live and get better. PT  Re-evaluated patient and feel he is at baseline and ok to return to group home. Will discuss with case management on availability to return to former living facility. Discontinued midodrine, blood pressure remaining elevated with lower dose metoprolol.   4/27: Patient was placed back on legal hold.  Legal hold paperwork is filled out and psych facility transfer is pending.  4/28: Psych facility transfer is pending.  I will discuss with surgery whether to reverse his colostomy bag is a possibility as inpatient.  4/29: No acute issues per patient today.  We will continue to evaluate for psych facility.  4/30 seconds extended legal hold.  Continue to find psych facility for placement.  This may take a while since he has a colostomy bag and the facility will take him after 6 months since has been placed.  5/1: No acute issues today  5/2: No acute issues today and will continue to wait for placement.  5/3: Legal hold discontinued by psychiatry as they believe it was due to secondary " gain rather than suicide intent.  We will find placement for this patient now.  5/4: Patient has no complaints when I examined the patient.  Later on patient did mention suicide without intent or a plan to the nursing staff it appears that he does use this as a form of malingering when he wants attention.  I did discuss with the patient that we will try to find a group home form.  Blood sugar well controlled glucose 117.  Continue current insulin regimen  T-max 97.9, /89.  5/5: Patient again endorsed to nursing staff that he had suicidal ideations.  However when I meet with the patient he reports that he really does not have suicidal ideations.  I asked him why he had made that comment he reports that he had been feeling lonely and wanted to have attention from the staff.  I discussed with him that if he is feeling lonely and would like to have someone stop by to say hi he just needs to let the staff know instead of reporting suicidal ideations, unless he really is having suicidal ideations.  Patient adamantly denies that he is having suicidal ideations.  Continuing to work with patient to find a group home.  Nursing is working with the patient so that he can change his ostomy on his own.  Glucose is 106.  T-max 97, /55  5/6: Patient has no complaints this time.  Nurse reports no overnight events.  Patient ostomy is working well.  Vital signs stable.  Glucoses 113.  5/7: Patient periodically tells nursing staff due to suicidal ideations however discussion with the patient he denies any suicidal ideations and reports that he was wanting attention.  Reemphasized to the patient that reporting suicidal ideations are not true is an appropriate and that if he is needing something he just needs to ask patient reports understanding.  Continuing telemetry sitter.  Glucose well controlled 104 continue current management  T-max 98, /68.  5/8: Glucose 103.  He has no complaints.  Reports that he is doing  better working with his colostomy.  Feels that he could handle it at a group home.  T-max 98.1, /75.  5/9: Patient has no complaints and no overnight events.  He reports that he has been able to manage his colostomy on his own yesterday and feels that he is more comfortable.  Denies any suicidal ideations.  T-max 98.6, /86.  Glucose 100.  5/10: Patient today endorsing suicidal ideations but does not give a plan.  Legal hold was restarted yesterday and psych is following.  Working with general surgery to reverse patient's colostomy, since he does not consistently manage his colostomy.  Glucose 102.  T-max 97, /76.    Consultants/Specialty  Cardiology  Psychiatry  General surgery    Code Status  Full Code    Disposition  TBD- Civil commitment discontinued-working on discharge with case management    Review of Systems  Review of Systems   Constitutional: Negative for fever and malaise/fatigue.   Respiratory: Negative for cough and shortness of breath.    Cardiovascular: Negative for chest pain and leg swelling.   Gastrointestinal: Negative for abdominal pain, constipation, diarrhea, nausea and vomiting.        Colostomy in place    Neurological: Negative for weakness.   Psychiatric/Behavioral: Positive for suicidal ideas. Negative for depression.   All other systems reviewed and are negative.     Physical Exam  Temp:  [35.6 °C (96.1 °F)-36.1 °C (97 °F)] 35.8 °C (96.5 °F)  Pulse:  [70-83] 71  Resp:  [18-20] 18  BP: (110-136)/(60-81) 136/76  SpO2:  [94 %-97 %] 94 %    Physical Exam  Vitals and nursing note reviewed.   Constitutional:       General: He is awake. He is not in acute distress.     Appearance: Normal appearance. He is well-developed. He is not ill-appearing.   HENT:      Head: Normocephalic and atraumatic.      Mouth/Throat:      Lips: Pink.      Mouth: Mucous membranes are moist.   Eyes:      General: Lids are normal.      Conjunctiva/sclera: Conjunctivae normal.      Pupils: Pupils are  equal, round, and reactive to light.   Cardiovascular:      Rate and Rhythm: Normal rate and regular rhythm.      Pulses: Normal pulses.      Heart sounds: Normal heart sounds.   Pulmonary:      Effort: Pulmonary effort is normal. No respiratory distress.      Breath sounds: Normal breath sounds. No decreased breath sounds or wheezing.   Abdominal:      General: Bowel sounds are normal. There is no distension.      Palpations: Abdomen is soft.      Tenderness: There is no abdominal tenderness. There is no guarding.       Musculoskeletal:      Cervical back: Neck supple.      Right lower leg: No edema.      Left lower leg: No edema.   Skin:     General: Skin is warm and dry.      Coloration: Skin is pale.   Neurological:      General: No focal deficit present.      Mental Status: He is alert and oriented to person, place, and time.      Cranial Nerves: No cranial nerve deficit.   Psychiatric:         Attention and Perception: Attention and perception normal.         Mood and Affect: Affect normal. Mood is depressed.         Speech: Speech normal.         Behavior: Behavior normal. Behavior is cooperative.         Thought Content: Thought content does not include suicidal ideation. Thought content does not include suicidal plan.         Cognition and Memory: Memory normal.      Comments: Patient states depressed secondary to colostomy and further need for it, stated to provider that he has no intention of harming himself and is not suicidal at this time.          Fluids    Intake/Output Summary (Last 24 hours) at 5/10/2021 1450  Last data filed at 5/10/2021 0800  Gross per 24 hour   Intake 480 ml   Output 660 ml   Net -180 ml     Laboratory          Imaging  [id number] VIEW   Final Result         No significant interval change.      DX-CHEST-LIMITED (1 VIEW)   Final Result         Diffuse interstitial prominence could relate to mild pulmonary edema or atypical infection      [id number] VIEW   Final Result     "  Increased colonic gas without definite bowel obstruction.      US-RENAL   Final Result      No evidence of hydronephrosis.      Lobulated kidneys bilaterally.      CT-ABDOMEN-PELVIS WITH   Final Result      1.  There is no evidence of small bowel obstruction.   2.  There is a left lower quadrant ostomy.   3.  There is fluid distention of the colon distal to the surgical material in the left mid descending colon extending all the way to the rectum. There is no pneumatosis or free air.   4.  There is cholelithiasis without biliary dilatation.   5.  There has been interval removal of the drainage catheter anterior to the spleen with minimal hypodense area in the anterior spleen again noted. There is no new fluid collection.      IR-US GUIDED PIV   Final Result    Ultrasound-guided PERIPHERAL IV INSERTION performed by    qualified nursing staff as above.      EC-ECHOCARDIOGRAM COMPLETE W/O CONT   Final Result      DX-LUMBAR SPINE-2 OR 3 VIEWS   Final Result      Moderate compression deformity of T11 is new compared to 2018.      Mild wedge deformity of T12 is unchanged.      Degenerative changes including facet arthropathy.      Mild retrolisthesis of L5 on S1 and L3 on L4.            Assessment/Plan  * Suicidal ideation- (present on admission)  Assessment & Plan  -Has history of MDD, recurrent with psychotic features. active SI w/ plan on admission  -Psychiatry following, recommended discontinuation of civil commitment - suspect possible malingering or secondary gain   - Evaluated bedside again and discussed with patient who states he \"wants to live\"   -Psych recommending sitter to ensure patient does not attempt actions that would extend his stay   5/5: Patient reports that he is telling staff that his suicidal ideations because he would like attention and not because he is actually having suicidal ideations.  5/10: placed on legal hold.    Orthostatic hypotension- (present on admission)  Assessment & " Plan  -Multifactorial including adrenal insufficiency, medications (beta blocker, CCB, tamsulosin), diabetic autonomic dysfunction, and venous insufficiency.  -EF is noted to be 60%, significant improvement from prior (previously 35%).  -Per cardiology, no further cardiac work-up needed.  Recommends follow-up with them as outpatient.  -Fall precautions.  -Continue to educate on the importance of slow positional changes.   - Started PO hydrocortisone 4/7/2021 for AI. Continuing florinef.   -Decreased toprolol which allowed for discontinuation of midodrine  Improved     Paroxysmal atrial fibrillation (HCC)- (present on admission)  Assessment & Plan  -CHADSVASC 3 (HTN, CHF, DM).   -TTE Feb 2021: EF 60%.  -Continue metoprolol, digoxin, and eliquis.    Chronic heart failure with preserved ejection fraction (HCC)- (present on admission)  Assessment & Plan  -Now stable.  -Pulmonary edema noted in mid-March, was placed on IV diuresis though that was held when he developed a gram-negative gina bacteremia and sepsis.  Currently doing well from a respiratory standpoint, no shortness of breath, lungs clear to auscultation bilaterally, not requiring oxygen supplementation.  -Repeat echocardiogram showed improvement in his ejection fraction to 60%.    -Continue metoprolol succinate.  -Lisinopril held per cardiology recommendations due to hypotension.    Diabetes mellitus type 2, insulin dependent (HCC)- (present on admission)  Assessment & Plan  -A1c 5.2% on 2/14/2021.  -Continue diabetic diet.  -Continue lantus at current dose   -Hypoglycemia protocol in place if needed.      Major depressive disorder, recurrent, severe with psychotic features (HCC)- (present on admission)  Assessment & Plan  -Management as listed under 'anxiety and depression'.    Anxiety and depression- (present on admission)  Assessment & Plan  -Legal hold extended, awaiting acceptance at psych facility ([facility] will take in May).  -Remains expressing suicidal  ideations but actions contradict.   -Psychiatry signed off feel he is malingering   -Continue paxil, klonopin, & geodon.     Hypothyroidism- (present on admission)  Assessment & Plan  -TFTs in Feb were WNL. Continue levothyroxine at current dose.     Adrenal insufficiency (HCC)- (present on admission)  Assessment & Plan  -Started PO hydrocortisone 4/7/2021. Continue florinef.   -Needs outpatient endocrinology follow up.  -Monitor potassium while on florinef. 4.4 on 4/7/2021.  -Repeat orthostatics periodically if symptomatic         Obstructive uropathy- (present on admission)  Assessment & Plan  -Trial off of flomax given his hypotension.  Discontinued 4/6/2021. No retention continue off Flomax    Chronic venous insufficiency of lower extremity- (present on admission)  Assessment & Plan  -Continue compression stockings.    Colostomy present (HCC)- (present on admission)  Assessment & Plan  -History of fall at home Nov 2020 on left side with perforated colon and splenic fluid collection/hematoma s/p segmental colectomy, colostomy, mobilization of the splenic flexure, wound vac, and I&D Nov 2020.  -Wound care consulted for ostomy care, but he was non-participatory. He is now engaging in ostomy care with his bedside RNs, encouraged to be observant but do it independently.  -Patient must be independently able to care for his ostomy before inpatient psychiatry will accept him.  Per surgery Dr. Desouza, ostomy reversal is high risk given patient's complexities. Surgery recommending ostomy reversal as an outpatient    Debility- (present on admission)  Assessment & Plan  -Cleared by PT/OT.  -Out of bed for each meal and ambulate in the hallways at least twice per shift.    Mixed hyperlipidemia- (present on admission)  Assessment & Plan  Lab Results   Component Value Date/Time    CHOLSTRLTOT 146 02/14/2021 01:30 AM    LDL 82 02/14/2021 01:30 AM    HDL 32 (A) 02/14/2021 01:30 AM    TRIGLYCERIDE 160 (H) 02/14/2021 01:30 AM    -Continue atorvastatin.     Lower limb amputation, great toe (HCC)- (present on admission)  Assessment & Plan  -History of, 2018.  Site well-healed.    Class 2 severe obesity with serious comorbidity and body mass index (BMI) of 35.0 to 35.9 in adult (HCC)- (present on admission)  Assessment & Plan  Body mass index is 35.19 kg/m².-Trending down.  -Could benefit from outpatient weight loss counseling which can be arranged through his PCP after hospital discharge.       VTE prophylaxis: Eliquis    I have performed a physical exam and reviewed and updated ROS and Plan today (5/10/2021). In review of yesterday's note (5/9/2021), there are no changes except as documented above.

## 2021-05-10 NOTE — CONSULTS
BRIEF PSYCHOLOGY NOTE: Patient seen and assessed. Full note to follow.     Patient is allowed to have access to:    [employer]    TV without the call light    Plan:    Legal status: Involuntary. Legal hold remains extended     Tele-sitter with q15 checks. Upgrade to 1:1 sitter if he engages in any self-harming or suicidal behavior or gesture. Will reevaluate tomorrow.     Limit conversations with the patient to only as needed interactions as his suicidality can worsen with perceived negative interactions with staff.      No further changes to legal hold restrictions other than what is stated above.      Anticipate F/U within 72 hours by a qualified mental health professional.       Records reviewed: yes     Discussed patient with other provider: yes, hospitalist     Will continue to develop behavioral plan to address suicidality and self-harm     Will continue to follow

## 2021-05-10 NOTE — CARE PLAN
Problem: Knowledge Deficit  Goal: Knowledge of disease process/condition, treatment plan, diagnostic tests, and medications will improve  Outcome: PROGRESSING AS EXPECTED  Note: Plan of care for the evening and associated interventions was discussed with patient. Telesitter remains in room for safety. Q 15 minute rounding in progress for safety.     Problem: Pain Management  Goal: Pain level will decrease to patient's comfort goal  Outcome: PROGRESSING AS EXPECTED  Note: Patient denies acute pain issues this evening.

## 2021-05-11 LAB — GLUCOSE BLD-MCNC: 102 MG/DL (ref 65–99)

## 2021-05-11 PROCEDURE — 700102 HCHG RX REV CODE 250 W/ 637 OVERRIDE(OP): Performed by: NURSE PRACTITIONER

## 2021-05-11 PROCEDURE — 700102 HCHG RX REV CODE 250 W/ 637 OVERRIDE(OP): Performed by: HOSPITALIST

## 2021-05-11 PROCEDURE — 700102 HCHG RX REV CODE 250 W/ 637 OVERRIDE(OP): Performed by: STUDENT IN AN ORGANIZED HEALTH CARE EDUCATION/TRAINING PROGRAM

## 2021-05-11 PROCEDURE — A9270 NON-COVERED ITEM OR SERVICE: HCPCS | Performed by: STUDENT IN AN ORGANIZED HEALTH CARE EDUCATION/TRAINING PROGRAM

## 2021-05-11 PROCEDURE — A9270 NON-COVERED ITEM OR SERVICE: HCPCS | Performed by: HOSPITALIST

## 2021-05-11 PROCEDURE — 99231 SBSQ HOSP IP/OBS SF/LOW 25: CPT | Performed by: STUDENT IN AN ORGANIZED HEALTH CARE EDUCATION/TRAINING PROGRAM

## 2021-05-11 PROCEDURE — 770001 HCHG ROOM/CARE - MED/SURG/GYN PRIV*

## 2021-05-11 PROCEDURE — 82962 GLUCOSE BLOOD TEST: CPT

## 2021-05-11 PROCEDURE — A9270 NON-COVERED ITEM OR SERVICE: HCPCS | Performed by: NURSE PRACTITIONER

## 2021-05-11 RX ADMIN — SIMETHICONE 80 MG: 80 TABLET, CHEWABLE ORAL at 05:29

## 2021-05-11 RX ADMIN — FLUDROCORTISONE ACETATE 0.1 MG: 0.1 TABLET ORAL at 05:29

## 2021-05-11 RX ADMIN — HYDROCORTISONE 10 MG: 10 TABLET ORAL at 05:29

## 2021-05-11 RX ADMIN — APIXABAN 5 MG: 5 TABLET, FILM COATED ORAL at 20:38

## 2021-05-11 RX ADMIN — METOPROLOL SUCCINATE 12.5 MG: 25 TABLET, EXTENDED RELEASE ORAL at 05:29

## 2021-05-11 RX ADMIN — ZIPRASIDONE HYDROCHLORIDE 60 MG: 60 CAPSULE ORAL at 07:47

## 2021-05-11 RX ADMIN — CLONAZEPAM 0.5 MG: 0.5 TABLET ORAL at 07:47

## 2021-05-11 RX ADMIN — APIXABAN 5 MG: 5 TABLET, FILM COATED ORAL at 07:47

## 2021-05-11 RX ADMIN — CLONAZEPAM 1 MG: 1 TABLET ORAL at 17:06

## 2021-05-11 RX ADMIN — SIMETHICONE 80 MG: 80 TABLET, CHEWABLE ORAL at 17:06

## 2021-05-11 RX ADMIN — DIGOXIN 125 MCG: 125 TABLET ORAL at 17:06

## 2021-05-11 RX ADMIN — SIMETHICONE 80 MG: 80 TABLET, CHEWABLE ORAL at 11:33

## 2021-05-11 RX ADMIN — LEVOTHYROXINE SODIUM 112 MCG: 0.11 TABLET ORAL at 05:29

## 2021-05-11 RX ADMIN — HYDROCORTISONE 5 MG: 10 TABLET ORAL at 14:39

## 2021-05-11 RX ADMIN — POTASSIUM CHLORIDE 20 MEQ: 20 TABLET, EXTENDED RELEASE ORAL at 05:29

## 2021-05-11 RX ADMIN — ZIPRASIDONE HYDROCHLORIDE 60 MG: 60 CAPSULE ORAL at 20:38

## 2021-05-11 RX ADMIN — ATORVASTATIN CALCIUM 40 MG: 40 TABLET, FILM COATED ORAL at 17:06

## 2021-05-11 RX ADMIN — PAROXETINE HYDROCHLORIDE 20 MG: 20 TABLET, FILM COATED ORAL at 07:47

## 2021-05-11 RX ADMIN — INSULIN GLARGINE 5 UNITS: 100 INJECTION, SOLUTION SUBCUTANEOUS at 05:30

## 2021-05-11 ASSESSMENT — ENCOUNTER SYMPTOMS
ABDOMINAL PAIN: 0
ROS GI COMMENTS: COLOSTOMY IN PLACE
NAUSEA: 0
DEPRESSION: 0
NERVOUS/ANXIOUS: 1
VOMITING: 0
CONSTIPATION: 0
SHORTNESS OF BREATH: 0
FEVER: 0
COUGH: 0
DIARRHEA: 0
WEAKNESS: 0

## 2021-05-11 ASSESSMENT — PATIENT HEALTH QUESTIONNAIRE - PHQ9
5. POOR APPETITE OR OVEREATING: SEVERAL DAYS
9. THOUGHTS THAT YOU WOULD BE BETTER OFF DEAD, OR OF HURTING YOURSELF: MORE THAN HALF THE DAYS
SUM OF ALL RESPONSES TO PHQ9 QUESTIONS 1 AND 2: 6
6. FEELING BAD ABOUT YOURSELF - OR THAT YOU ARE A FAILURE OR HAVE LET YOURSELF OR YOUR FAMILY DOWN: MORE THAN HALF THE DAYS
1. LITTLE INTEREST OR PLEASURE IN DOING THINGS: NEARLY EVERY DAY
3. TROUBLE FALLING OR STAYING ASLEEP OR SLEEPING TOO MUCH: MORE THAN HALF THE DAYS
7. TROUBLE CONCENTRATING ON THINGS, SUCH AS READING THE NEWSPAPER OR WATCHING TELEVISION: MORE THAN HALF THE DAYS
8. MOVING OR SPEAKING SO SLOWLY THAT OTHER PEOPLE COULD HAVE NOTICED. OR THE OPPOSITE, BEING SO FIGETY OR RESTLESS THAT YOU HAVE BEEN MOVING AROUND A LOT MORE THAN USUAL: SEVERAL DAYS
4. FEELING TIRED OR HAVING LITTLE ENERGY: MORE THAN HALF THE DAYS
2. FEELING DOWN, DEPRESSED, IRRITABLE, OR HOPELESS: NEARLY EVERY DAY
SUM OF ALL RESPONSES TO PHQ QUESTIONS 1-9: 18

## 2021-05-11 NOTE — DISCHARGE PLANNING
@1038  Agency/Facility Name: [facility]  Spoke To: Jorgito  Outcome: Need new progress notes and a note showing he can care for his ostomy. Notes sent.    Agency/Facility Name: [facility]  Spoke To: Jorgito  Outcome: Checking with Doc.

## 2021-05-11 NOTE — CARE PLAN
Problem: Safety  Goal: Will remain free from injury  Outcome: PROGRESSING AS EXPECTED  Note: Pt will remain free from falls during hospital stay     Problem: Knowledge Deficit  Goal: Knowledge of disease process/condition, treatment plan, diagnostic tests, and medications will improve  Outcome: PROGRESSING AS EXPECTED  Note: Pt will understand POC and medications administered and be able to teach back about each

## 2021-05-11 NOTE — PROGRESS NOTES
Hospital Medicine Twice Weekly Progress Note    Date of Service  5/11/2021      Chief Complaint  Suicidal ideation    Hospital Course  Mr. Castro is a 56 year-old man with a past medical history significant for atrial fibrillation, secondary hypercoagulable state, diabetes mellitus complicated by diabetic neuropathy, chronic systolic CHF, colostomy placement, orthostatic hypotension (maintained on florinef), and dyslipidemia who presented to the ED on 2/9/2021 after he became dizzy and had a syncopal episode at home.  In the ED he was noted to be hypotensive and bradycardic. Cardiology was consulted and recommended stopping all AV sanket blocking medications.  He remained orthostatic during his hospitalization so midodrine was started.  An echocardiogram was obtained and showed mild mitral regurgitation and an EF of 60% which was improved from his previous echocardiogram obtained on 11/2020 (previously 35%).  CTAs of the head and neck were done and were unremarkable outside of distal bilateral ICA tortuosity.  Also during his hospitalization he expressed desire to kill himself.  He has a colostomy and has become overwhelmed by caring for it.  He was subsequently placed on a legal hold and psychiatry was consulted.  He is now pending [facility] placement at the 6-month db for maturity of his colostomy.    Interval Problem Update  Patient was seen and examined at bedside.  No acute events overnight, no active complaints on a.m. rounds.  Patient stated that today's a good day for him and denied depression and suicidal ideation  Sitter at bedside. Pt is still on legal hold which was restarted 5/10.   Working with general surgery to reverse patient's colostomy, since the pt does not consistently manage his colostomy & managing the colostomy makes the patient to be depressed and suicidal.      Consultants/Specialty  Cardiology  Psychiatry  General surgery    Code Status  Full Code    Disposition  TBD    Review of  Systems  Review of Systems   Constitutional: Negative for fever and malaise/fatigue.   Respiratory: Negative for cough and shortness of breath.    Cardiovascular: Negative for chest pain and leg swelling.   Gastrointestinal: Negative for abdominal pain, constipation, diarrhea, nausea and vomiting.        Colostomy in place    Neurological: Negative for weakness.   Psychiatric/Behavioral: Negative for depression and suicidal ideas. The patient is nervous/anxious.    All other systems reviewed and are negative.     Physical Exam  Temp:  [36.1 °C (97 °F)-36.8 °C (98.3 °F)] 36.3 °C (97.4 °F)  Pulse:  [60-81] 81  Resp:  [16-18] 18  BP: (127-146)/(61-87) 127/61  SpO2:  [92 %-96 %] 92 %    Physical Exam  Vitals and nursing note reviewed.   Constitutional:       General: He is awake. He is not in acute distress.     Appearance: Normal appearance. He is well-developed. He is not ill-appearing.   HENT:      Head: Normocephalic and atraumatic.      Mouth/Throat:      Lips: Pink.      Mouth: Mucous membranes are moist.   Eyes:      General: Lids are normal.      Conjunctiva/sclera: Conjunctivae normal.      Pupils: Pupils are equal, round, and reactive to light.   Cardiovascular:      Rate and Rhythm: Normal rate and regular rhythm.      Pulses: Normal pulses.      Heart sounds: Normal heart sounds.   Pulmonary:      Effort: Pulmonary effort is normal. No respiratory distress.      Breath sounds: Normal breath sounds. No decreased breath sounds or wheezing.   Abdominal:      General: Bowel sounds are normal. There is no distension.      Palpations: Abdomen is soft.      Tenderness: There is no abdominal tenderness. There is no guarding.       Musculoskeletal:      Cervical back: Neck supple.      Right lower leg: No edema.      Left lower leg: No edema.   Skin:     General: Skin is warm and dry.      Coloration: Skin is pale.   Neurological:      General: No focal deficit present.      Mental Status: He is alert and oriented to  person, place, and time.      Cranial Nerves: No cranial nerve deficit.   Psychiatric:         Attention and Perception: Attention and perception normal.         Mood and Affect: Affect normal. Mood is depressed.         Speech: Speech normal.         Behavior: Behavior normal. Behavior is cooperative.         Thought Content: Thought content does not include suicidal ideation. Thought content does not include suicidal plan.         Cognition and Memory: Memory normal.      Comments: Patient states depressed secondary to colostomy and further need for it, stated to provider that he has no intention of harming himself and is not suicidal at this time.          Fluids    Intake/Output Summary (Last 24 hours) at 5/11/2021 1307  Last data filed at 5/11/2021 0801  Gross per 24 hour   Intake 480 ml   Output 75 ml   Net 405 ml     Laboratory          Imaging  [id number] VIEW   Final Result         No significant interval change.      DX-CHEST-LIMITED (1 VIEW)   Final Result         Diffuse interstitial prominence could relate to mild pulmonary edema or atypical infection      FJ-[id number] VIEW   Final Result      Increased colonic gas without definite bowel obstruction.      US-RENAL   Final Result      No evidence of hydronephrosis.      Lobulated kidneys bilaterally.      CT-ABDOMEN-PELVIS WITH   Final Result      1.  There is no evidence of small bowel obstruction.   2.  There is a left lower quadrant ostomy.   3.  There is fluid distention of the colon distal to the surgical material in the left mid descending colon extending all the way to the rectum. There is no pneumatosis or free air.   4.  There is cholelithiasis without biliary dilatation.   5.  There has been interval removal of the drainage catheter anterior to the spleen with minimal hypodense area in the anterior spleen again noted. There is no new fluid collection.      IR-US GUIDED PIV   Final Result    Ultrasound-guided PERIPHERAL IV INSERTION performed  "by    qualified nursing staff as above.      EC-ECHOCARDIOGRAM COMPLETE W/O CONT   Final Result      DX-LUMBAR SPINE-2 OR 3 VIEWS   Final Result      Moderate compression deformity of T11 is new compared to 2018.      Mild wedge deformity of T12 is unchanged.      Degenerative changes including facet arthropathy.      Mild retrolisthesis of L5 on S1 and L3 on L4.            Assessment/Plan  * Suicidal ideation- (present on admission)  Assessment & Plan  -Has history of MDD, recurrent with psychotic features. active SI w/ plan on admission  -Psychiatry following, recommended discontinuation of civil commitment - suspect possible malingering or secondary gain   - Evaluated bedside again and discussed with patient who states he \"wants to live\"   -Psych recommending sitter to ensure patient does not attempt actions that would extend his stay   5/5: Patient reports that he is telling staff that his suicidal ideations because he would like attention and not because he is actually having suicidal ideations.  5/10: placed on legal hold.  Patient is on legal hold with one-to-one sitter    Orthostatic hypotension- (present on admission)  Assessment & Plan  -Multifactorial including adrenal insufficiency, medications (beta blocker, CCB, tamsulosin), diabetic autonomic dysfunction, and venous insufficiency.  -EF is noted to be 60%, significant improvement from prior (previously 35%).  -Per cardiology, no further cardiac work-up needed.  Recommends follow-up with them as outpatient.  -Fall precautions.  -Continue to educate on the importance of slow positional changes.   - Started PO hydrocortisone 4/7/2021 for AI. Continuing florinef.   -Decreased toprolol which allowed for discontinuation of midodrine  Improved     Paroxysmal atrial fibrillation (HCC)- (present on admission)  Assessment & Plan  -CHADSVASC 3 (HTN, CHF, DM).   -TTE Feb 2021: EF 60%.  -Continue metoprolol, digoxin, and eliquis.    Chronic heart failure with " preserved ejection fraction (HCC)- (present on admission)  Assessment & Plan  -Now stable.  -Pulmonary edema noted in mid-March, was placed on IV diuresis though that was held when he developed a gram-negative gina bacteremia and sepsis.  Currently doing well from a respiratory standpoint, no shortness of breath, lungs clear to auscultation bilaterally, not requiring oxygen supplementation.  -Repeat echocardiogram showed improvement in his ejection fraction to 60%.    -Continue metoprolol succinate.  -Lisinopril held per cardiology recommendations due to hypotension.    Diabetes mellitus type 2, insulin dependent ([facility])- (present on admission)  Assessment & Plan  -A1c 5.2% on 2/14/2021.  -Continue diabetic diet.  -Continue lantus at current dose   -Hypoglycemia protocol in place if needed.      Major depressive disorder, recurrent, severe with psychotic features ([facility])- (present on admission)  Assessment & Plan  -Management as listed under 'anxiety and depression'.    Anxiety and depression- (present on admission)  Assessment & Plan  -Legal hold, awaiting acceptance at psych facility ([facility] will take in May).  -Continue paxil, klonopin & geodon.     Hypothyroidism- (present on admission)  Assessment & Plan  -TFTs in Feb were WNL. Continue levothyroxine at current dose.     Adrenal insufficiency ([facility])- (present on admission)  Assessment & Plan  -Started PO hydrocortisone 4/7/2021. Continue florinef.   -Needs outpatient endocrinology follow up.  -Monitor potassium while on florinef. 4.4 on 4/7/2021.  -Repeat orthostatics periodically if symptomatic         Obstructive uropathy- (present on admission)  Assessment & Plan  -Trial off of flomax given his hypotension.  Discontinued 4/6/2021. No retention continue off Flomax    Chronic venous insufficiency of lower extremity- (present on admission)  Assessment & Plan  -Continue compression stockings.    Colostomy present (HCC)- (present on admission)  Assessment &  Plan  -History of fall at home Nov 2020 on left side with perforated colon and splenic fluid collection/hematoma s/p segmental colectomy, colostomy, mobilization of the splenic flexure, wound vac, and I&D Nov 2020.  -Wound care consulted for ostomy care, but he was non-participatory. He is now engaging in ostomy care with his bedside RNs, encouraged to be observant but do it independently.  -Patient must be independently able to care for his ostomy before inpatient psychiatry will accept him.  Per surgery Dr. Desouza, ostomy reversal is high risk given patient's complexities. Surgery recommending ostomy reversal as an outpatient    Debility- (present on admission)  Assessment & Plan  -Cleared by PT/OT.  -Out of bed for each meal and ambulate in the hallways at least twice per shift.    Mixed hyperlipidemia- (present on admission)  Assessment & Plan  Lab Results   Component Value Date/Time    CHOLSTRLTOT 146 02/14/2021 01:30 AM    LDL 82 02/14/2021 01:30 AM    HDL 32 (A) 02/14/2021 01:30 AM    TRIGLYCERIDE 160 (H) 02/14/2021 01:30 AM   -Continue atorvastatin.     Lower limb amputation, great toe (HCC)- (present on admission)  Assessment & Plan  -History of, 2018.  Site well-healed.    Class 2 severe obesity with serious comorbidity and body mass index (BMI) of 35.0 to 35.9 in adult (HCC)- (present on admission)  Assessment & Plan  Body mass index is 35.19 kg/m².  -Could benefit from outpatient weight loss counseling which can be arranged through his PCP after hospital discharge.       VTE prophylaxis: Eliquis

## 2021-05-11 NOTE — DISCHARGE PLANNING
Anticipated Discharge Disposition:   Inpatient Psych    Action:   Discussed discharge planning needs. Per MD, pt is medically cleared to discharge to inpatient psych. Per Psych note on 5/10, pt is on Legal Hold. DPA to follow up. Legal Hold paperwork faxed to [employer].    Per chart review, pt will need a new rep payee. RN CM called [facility] [phone], spoke to Sarah. Per Sarah, they only accept new patients who are discharging to [facility] [place]. If pt going to , email Sarah [email or web address] and she will email back their intake paperwork.     Barriers to Discharge:   Inpatient psych acceptance and bed availability.    Plan:   Follow up with DPA.  Hospital Care Management will continue to follow and assist with discharge planning needs.

## 2021-05-11 NOTE — CARE PLAN
Care Plan  Problem: Bowel/Gastric:  Goal: Normal bowel function is maintained or improved  Outcome: PROGRESSING AS EXPECTED     Problem: Discharge Barriers/Planning  Goal: Patient's continuum of care needs will be met  Outcome: NOT MET     Problem: Psychosocial Needs:  Goal: Level of anxiety will decrease  Outcome: NOT MET  Aox4, astx1 w/fww - however unable to leave room as of 5/10 per psyc. Legal hold in place for SI. Telesitter in place- no in person sitter pre psyc. No call light & TV only at the nurses discretion. Has ostomy- empties/ burps prn.

## 2021-05-12 LAB — GLUCOSE BLD-MCNC: 93 MG/DL (ref 65–99)

## 2021-05-12 PROCEDURE — 700102 HCHG RX REV CODE 250 W/ 637 OVERRIDE(OP): Performed by: NURSE PRACTITIONER

## 2021-05-12 PROCEDURE — A9270 NON-COVERED ITEM OR SERVICE: HCPCS | Performed by: NURSE PRACTITIONER

## 2021-05-12 PROCEDURE — 770001 HCHG ROOM/CARE - MED/SURG/GYN PRIV*

## 2021-05-12 PROCEDURE — 700102 HCHG RX REV CODE 250 W/ 637 OVERRIDE(OP): Performed by: STUDENT IN AN ORGANIZED HEALTH CARE EDUCATION/TRAINING PROGRAM

## 2021-05-12 PROCEDURE — 82962 GLUCOSE BLOOD TEST: CPT

## 2021-05-12 PROCEDURE — 700102 HCHG RX REV CODE 250 W/ 637 OVERRIDE(OP): Performed by: HOSPITALIST

## 2021-05-12 PROCEDURE — A9270 NON-COVERED ITEM OR SERVICE: HCPCS | Performed by: STUDENT IN AN ORGANIZED HEALTH CARE EDUCATION/TRAINING PROGRAM

## 2021-05-12 PROCEDURE — A9270 NON-COVERED ITEM OR SERVICE: HCPCS | Performed by: HOSPITALIST

## 2021-05-12 PROCEDURE — 99231 SBSQ HOSP IP/OBS SF/LOW 25: CPT | Performed by: STUDENT IN AN ORGANIZED HEALTH CARE EDUCATION/TRAINING PROGRAM

## 2021-05-12 RX ORDER — LORAZEPAM 1 MG/1
0.5 TABLET ORAL EVERY 4 HOURS PRN
Status: DISCONTINUED | OUTPATIENT
Start: 2021-05-12 | End: 2021-06-08

## 2021-05-12 RX ADMIN — APIXABAN 5 MG: 5 TABLET, FILM COATED ORAL at 08:32

## 2021-05-12 RX ADMIN — SIMETHICONE 80 MG: 80 TABLET, CHEWABLE ORAL at 06:00

## 2021-05-12 RX ADMIN — POTASSIUM CHLORIDE 20 MEQ: 20 TABLET, EXTENDED RELEASE ORAL at 06:00

## 2021-05-12 RX ADMIN — DOCUSATE SODIUM 50 MG AND SENNOSIDES 8.6 MG 2 TABLET: 8.6; 5 TABLET, FILM COATED ORAL at 05:45

## 2021-05-12 RX ADMIN — FLUDROCORTISONE ACETATE 0.1 MG: 0.1 TABLET ORAL at 05:43

## 2021-05-12 RX ADMIN — ZIPRASIDONE HYDROCHLORIDE 60 MG: 60 CAPSULE ORAL at 20:36

## 2021-05-12 RX ADMIN — CLONAZEPAM 0.5 MG: 0.5 TABLET ORAL at 08:32

## 2021-05-12 RX ADMIN — PAROXETINE HYDROCHLORIDE 20 MG: 20 TABLET, FILM COATED ORAL at 08:32

## 2021-05-12 RX ADMIN — HYDROCORTISONE 10 MG: 10 TABLET ORAL at 06:00

## 2021-05-12 RX ADMIN — APIXABAN 5 MG: 5 TABLET, FILM COATED ORAL at 20:36

## 2021-05-12 RX ADMIN — LEVOTHYROXINE SODIUM 112 MCG: 0.11 TABLET ORAL at 05:44

## 2021-05-12 RX ADMIN — DIGOXIN 125 MCG: 125 TABLET ORAL at 17:44

## 2021-05-12 RX ADMIN — ZIPRASIDONE HYDROCHLORIDE 60 MG: 60 CAPSULE ORAL at 08:32

## 2021-05-12 RX ADMIN — HYDROCORTISONE 5 MG: 10 TABLET ORAL at 14:08

## 2021-05-12 RX ADMIN — ATORVASTATIN CALCIUM 40 MG: 40 TABLET, FILM COATED ORAL at 17:44

## 2021-05-12 RX ADMIN — METOPROLOL SUCCINATE 12.5 MG: 25 TABLET, EXTENDED RELEASE ORAL at 05:44

## 2021-05-12 RX ADMIN — SIMETHICONE 80 MG: 80 TABLET, CHEWABLE ORAL at 17:44

## 2021-05-12 RX ADMIN — CLONAZEPAM 1 MG: 1 TABLET ORAL at 17:44

## 2021-05-12 RX ADMIN — SIMETHICONE 80 MG: 80 TABLET, CHEWABLE ORAL at 12:02

## 2021-05-12 ASSESSMENT — ENCOUNTER SYMPTOMS
VOMITING: 0
FEVER: 0
DEPRESSION: 0
DIARRHEA: 0
NAUSEA: 0
COUGH: 0
NERVOUS/ANXIOUS: 1
ABDOMINAL PAIN: 0
CONSTIPATION: 0
WEAKNESS: 0
SHORTNESS OF BREATH: 0
ROS GI COMMENTS: COLOSTOMY IN PLACE

## 2021-05-12 NOTE — WOUND TEAM
"  [facility]   Inpatient Services   Established Ostomy Management/ troubleshooting  HPI: Reviewed  PMH: Reviewed   SH: Reviewed   Reason for Ostomy nurse consult:  Established colostomy        Objective: appliance intact. Placed on legal hold 5/10, tele sitter 1:1    Colostomy 11/10/20 Transverse LUQ (Active)   Stomal Appliance Assessment Intact    Stoma Assessment Pink    Stoma Shape Budded Less Than One Inch;Oval    Stoma Size (in) 1.25    Peristomal Assessment Intact    Mucocutaneous Junction Intact    Treatment Appliance Changed    Peristomal Protectant Paste Ring;Stoma Powder;No Sting Skin Prep    Stomal Appliance Paste Ring, 2\";2 1/4\" (57mm) CTF    Output (mL) 75 mL    Output Color Brown    WOUND RN ONLY - Stomal Appliance  2 Piece;Paste Ring, 2\";Transparent Pouch Lock & Roll;2 1/4\" Moldable    Appliance (Pouch) # [id number]    Appliance Brand [employer]    Appliance Supplier [email or web address]    Secure Start completed Yes    Ostomy Care Resources Provided [facility] Tip Sheet          Ostomy Appliance (type and size): 2 1/4\" Formaflex barrier with pouch and Paste Ring,      Interventions and Education: Patient completed entire change himself with very MINIMAL verbal prompts from this wound RN. Patient removed appliance. Pt cleansed peristomal skin and stoma with moist warm wash cloth. Patient applied stoma powder, dusted, then applied no sting skin prep. Pt stretched barrier to fit. Then patient attached pouch to barrier. Pt applied paste ring to peristoma around stoma. Then placed barrier with bag already attached in place. Pt then closed pouch end.    Evaluation:   5/12: Pt performed own care. Some difficulty with managing packaging, but overall did a well job.    5/8/21: patient in pleasant mood and agreeable to doing entire change himself again. Less verbal prompts used than last change, patient completed entire change himself. Only had issues opening certain packages, otherwise did a fantastic job. Much " easier for patient to attach bag prior to applying appliance. Wound team to continue to follow until patient completely confident in his own changes.     5/5/21: Patient in pleasant mood today and very willing to do entire change himself. Needed some verbal prompts and reminders but otherwise patient completed entire change himself. Will attempt to see patient again in a few days so he can get more comfortable doing entire hands on himself.     4/25: Pt very down today. Pt able to do some steps but needs help from RN still. Pt requested that MD reconsiders takedown after pt attempted to wrap light cord around his neck. Updated bedside RN to discuss with MD.     4/21: Pt continuing with hands on care. Expressed some difficulty attaching pouch, however, performs well with formaflex barrier.    4/15: used formaflex (moldable) barrier today which pt prefers to use. Pt stated he needs another education session because he needs more practice. Ostomy RN will continue to meet with pt.    4/7/21:  Pt reports he has been burping pouch and is capable of changing appliance. Pt does have difficulty cutting barrier. May be able to transition to moldable at next change. Pt appears in better spirits today. Ostomy RN to remain available for education.     03/31/21: patient capable of changing ostomy but still having emotional difficulty with it.  Wound team will continue to follow 1-2 times weekly.  Patient not appropriate for surgical reversal at this time.         3/15/21:  Pt being transferred to T8 related to bacteremia and allergic reaction to zosyn overnight. Appliance working appropriately. Skin intact. Will plan to sign off and be available PRN.     3/11/21: Patient formed own formaflex barrier pouch and paste ring, assisted in cleansing. Feeling better and willing to continue to get more hands on. Likes the formaflex because he is actually able to form it to his size. Still has not performed independently.      3/8/21:  Minimal participation, he attempts to do the steps with his eyes closed and minimal effort with his hands. Not sure if he lacks the fine motor movement or just going through the motions due to his expressed hatred of the colostomy. Patient has yet to perform all steps independently.     3/5/21: Pt did not participate in education today. Pt answered questions appropriately, but remained with his eyes closed through the duration of ostomy care. Declined hands on education when offered. Reported he was not feeling good. Formaflex barrier brought in today for pt as these do no require cutting with scissors. Extra barrier left in bag. CT of abd obtained yesterday did not reveal obstruction.     3/1/21: Viable stoma, producing stool. Per CNA report at bedside, appliance was leaking earlier and CNA changed appliance. Patient observed all steps today and participated with hands on care and did well. Discussed with MD on possibility for reversal. Per surgery, reversal of ostomy is an outpatient procedure that he will have to follow-up with in the future after discharge     2/26/21: Pt very down today, reports that psych has not seen him. Pt kept turning head away from ostomy today but did verbalized he will try to do hands on Sunday.      2/24/21: Pt unable to transfer to inpatient psych until he can care for colostomy independently. Ostomy RNs were asked to return for education. Pt extremely depressed today. Pt states he feels like an animal and that the colostomy is messy and smelly and he would rather die then care for it. This RN provided understanding and attempted to explain that colostomy is manageable. 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 Pt had colostomy created on November 10, 2020 by Dr. Desouza. May want to consider  contacting surgery for possible reversal related to pts extreme body dysmorphia (this is pts 2nd admit for SI related to colostomy) and inability for pt to DC due to pts inability/unwillingness to care for colostomy - Discussed with Dr. Ley ([facility] Resident).      Plan: Wound team to continue with teaching every 3-4 days to help teach patient how to care for appliance so that he can discharge to psych.     Anticipated discharge needs: patient would benefit from SNF, outpatient clinic or Home health for follow up care. Patient needs to follow up with Dr. Desouza after discharge to plan for ostomy reversal surgery.

## 2021-05-12 NOTE — CONSULTS
"Brief Behavioral Health Note: Risk Assessment    Length of Intervention: 30 minutes    HPI:   Mr. Castro is a 56 year-old man with a past medical history significant for atrial fibrillation, secondary hypercoagulable state, diabetes mellitus complicated by diabetic neuropathy, chronic systolic CHF, colostomy placement, orthostatic hypotension (maintained on florinef), and dyslipidemia who presented to the Emergency Department on 2/9/2021 after he became dizzy and had a syncopal episode at home. Patient's length of stay has extended due to repeated suicidal ideations and gestures. Patient has not been accepted to an inpatient psychiatric facility due to medical complexity beyond what the psychiatric hospitals believe they can safely manage.    Assessment:  Patient was seen lying on hospital bed, alert, oriented, speech clear and coherent. Patient states, \"I am feeling better today\". Patient was unable to clearly articulate what has changed for patient but continued to say, \"I am feeling better\". Patient demonstrated some range of emotion during the interview and was able to smile at times while discussing his mood changes.  Patient reports feeling, \"less suicidal\", than previously. He also reports increased comfort in changing his colostomy bag. \"I have been doing it more than before, with the help of nursing\". Patient did not report auditory or visual hallucinations today. No HI.  Patient believes his six months post op is over and hopes his colostomy can now be reversed. Referred patient to his provider for clarification regarding any plan for colostomy reversal.     Summary  At this time patient will continue to remain on a hold. Although his risk for self harm has decreased per his report, and observed mood elevation. Patient's vacillates so often with regards to suicidal ideations,  it would be important for patient to have several days of stability as it relates to decreased risk and no suicidal ideations at " all, with a plan for safety, before this hold can be lifted gain.    Recommendations:  1. Legal Hold-continued  2. Observation: Tele-sitter with q15 checks. Upgrade to 1:1 sitter if he engages in any self-harming or suicidal behavior or gesture.  3. Observation by RN during colostomy changes with all supplies taken from room when completed.    Continue Dr. Fernandez's recommendations:    Patient is allowed to have access to:     [employer]     TV without the call light     Maximum legal hold restrictions including the following:     -Strip room of anything he can hurt himself with including all cords. Do not leave anything in there that he could use to stick inside his body.     -Finger foods only     -Supervise ostomy bag changes and remove all supplies once he is done     -Place gloves on the patient or consider restraints if he attempts to pull the ostomy out and/or hurts himself in anyway      -Do not allow the patient to walk around the unit as he could grab something to hurt himself with while on that walk.      Thank you,    Susan Ewing, Ph.D., [facility]

## 2021-05-12 NOTE — CARE PLAN
Problem: Communication  Goal: The ability to communicate needs accurately and effectively will improve  Outcome: Progressing     Problem: Safety  Goal: Will remain free from injury  Outcome: Progressing  Goal: Will remain free from falls  Outcome: Progressing     Problem: Infection  Goal: Will remain free from infection  Outcome: Progressing     Problem: Venous Thromboembolism (VTW)/Deep Vein Thrombosis (DVT) Prevention:  Goal: Patient will participate in Venous Thrombosis (VTE)/Deep Vein Thrombosis (DVT)Prevention Measures  Outcome: Progressing     Problem: Bowel/Gastric:  Goal: Normal bowel function is maintained or improved  Outcome: Progressing  Goal: Will not experience complications related to bowel motility  Outcome: Progressing     Problem: Knowledge Deficit  Goal: Knowledge of disease process/condition, treatment plan, diagnostic tests, and medications will improve  Outcome: Progressing  Goal: Knowledge of the prescribed therapeutic regimen will improve  Outcome: Progressing     Problem: Discharge Barriers/Planning  Goal: Patient's continuum of care needs will be met  Outcome: Progressing     Problem: Fluid Volume:  Goal: Will maintain balanced intake and output  Outcome: Progressing     Problem: Psychosocial Needs:  Goal: Level of anxiety will decrease  Outcome: Progressing     Problem: Mobility  Goal: Risk for activity intolerance will decrease  Outcome: Progressing     Problem: Pain Management  Goal: Pain level will decrease to patient's comfort goal  Outcome: Progressing     Problem: Skin Integrity  Goal: Risk for impaired skin integrity will decrease  Outcome: Progressing     Problem: Potential for Self Harm  Goal: Achieve stable functioning  Outcome: Progressing  Goal: Abstinence  Outcome: Progressing  Goal: No active self-harm  Outcome: Progressing  Goal: Symptom reduction and improved functioning  Outcome: Progressing  Goal: Effective symptom management  Outcome: Progressing  Goal: Resolve acute  exacerbation and return to psychiatric baseline  Outcome: Progressing     Problem: Ineffective Coping  Goal: Achieve stable functioning  Outcome: Progressing  Goal: Abstinence  Outcome: Progressing  Goal: No active self-harm  Outcome: Progressing  Goal: Symptom reduction and improved functioning  Outcome: Progressing  Goal: Effective symptom management  Outcome: Progressing  Goal: Resolve acute exacerbation and return to psychiatric baseline  Outcome: Progressing     Problem: Respiratory:  Goal: Respiratory status will improve  Outcome: Progressing     Problem: Urinary Elimination:  Goal: Ability to reestablish a normal urinary elimination pattern will improve  Outcome: Progressing     Problem: Safety - Medical Restraint  Goal: Remains free of injury from restraints (Restraint for Interference with Medical Device)  Outcome: Progressing  Goal: Free from restraint(s) (Restraint for Interference with Medical Device)  Outcome: Progressing     Problem: Knowledge Deficit - Standard  Goal: Patient and family/care givers will demonstrate understanding of plan of care, disease process/condition, diagnostic tests and medications  Outcome: Progressing     Problem: Depression  Goal: Patient and family/caregiver will verbalize accurate information about at least two of the possible causes of depression, three-four of the signs and symptoms of depression  Outcome: Progressing     Problem: Provide Safe Environment  Goal: Suicide environmental safety, protocols, policies, and practices will be implemented  Outcome: Progressing     Problem: Psychosocial  Goal: Patient's ability to identify and develop effective coping behaviors will improve  Outcome: Progressing  Goal: Patient's ability to identify and utilize available support systems will improve  Outcome: Progressing     Problem: Hemodynamics  Goal: Patient's hemodynamics, fluid balance and neurologic status will be stable or improve  Outcome: Progressing     Problem: Fluid  Volume  Goal: Fluid volume balance will be maintained  Outcome: Progressing     Problem: Urinary - Renal Perfusion  Goal: Ability to achieve and maintain adequate renal perfusion and functioning will improve  Outcome: Progressing     Problem: Respiratory  Goal: Patient will achieve/maintain optimum respiratory ventilation and gas exchange  Outcome: Progressing     Problem: Mechanical Ventilation  Goal: Safe management of artificial airway and ventilation  Outcome: Progressing  Goal: Successful weaning off mechanical ventilator, spontaneously maintains adequate gas exchange  Outcome: Progressing  Goal: Patient will be able to express needs and understand communication  Outcome: Progressing     Problem: Physical Regulation  Goal: Diagnostic test results will improve  Outcome: Progressing  Goal: Signs and symptoms of infection will decrease  Outcome: Progressing   The patient is Stable - Low risk of patient condition declining or worsening

## 2021-05-12 NOTE — DISCHARGE PLANNING
Agency/Facility Name: [facility]  Spoke To: Jorgito  Outcome: Still reviewing referral, will call once a decision is made.

## 2021-05-12 NOTE — PROGRESS NOTES
Hospital Medicine Twice Weekly Progress Note    Date of Service  5/12/2021      Chief Complaint  Suicidal ideation    Hospital Course  Mr. Castro is a 56 year-old man with a past medical history significant for atrial fibrillation, secondary hypercoagulable state, diabetes mellitus complicated by diabetic neuropathy, chronic systolic CHF, colostomy placement, orthostatic hypotension (maintained on florinef), and dyslipidemia who presented to the ED on 2/9/2021 after he became dizzy and had a syncopal episode at home.  In the ED he was noted to be hypotensive and bradycardic. Cardiology was consulted and recommended stopping all AV sanket blocking medications.  He remained orthostatic during his hospitalization so midodrine was started.  An echocardiogram was obtained and showed mild mitral regurgitation and an EF of 60% which was improved from his previous echocardiogram obtained on 11/2020 (previously 35%).  CTAs of the head and neck were done and were unremarkable outside of distal bilateral ICA tortuosity.  Also during his hospitalization he expressed desire to kill himself.  He has a colostomy and has become overwhelmed by caring for it.  He was subsequently placed on a legal hold and psychiatry was consulted.  He is now pending [facility] placement at the 6-month db for maturity of his colostomy.    Interval Problem Update  Patient was seen and examined at bedside together with wound care team for stomy care. Pt did change his ostomy bag with a little supervision and he did great.    No acute events overnight, no active complaints on a.m. rounds.  Patient stated that today's a good day for him and denied depression and suicidal ideation   Pt is still on legal hold which was restarted 5/10. Tele sitter.   Working with general surgery to reverse patient's colostomy, since the pt does not consistently manage his colostomy & managing the colostomy makes the patient to be depressed and suicidal.  Pending placement to  inpatient psych facility    Consultants/Specialty  Cardiology  Psychiatry  General surgery    Code Status  Full Code    Disposition  Placement to inpatient psych facility    Review of Systems  Review of Systems   Constitutional: Negative for fever and malaise/fatigue.   Respiratory: Negative for cough and shortness of breath.    Cardiovascular: Negative for chest pain and leg swelling.   Gastrointestinal: Negative for abdominal pain, constipation, diarrhea, nausea and vomiting.        Colostomy in place    Neurological: Negative for weakness.   Psychiatric/Behavioral: Negative for depression and suicidal ideas. The patient is nervous/anxious.    All other systems reviewed and are negative.     Physical Exam  Temp:  [36.1 °C (96.9 °F)-36.7 °C (98.1 °F)] 36.1 °C (96.9 °F)  Pulse:  [65-74] 68  Resp:  [16-18] 16  BP: (133-152)/(74-87) 152/75  SpO2:  [94 %-97 %] 97 %    Physical Exam  Vitals and nursing note reviewed.   Constitutional:       General: He is awake. He is not in acute distress.     Appearance: Normal appearance. He is well-developed. He is not ill-appearing.   HENT:      Head: Normocephalic and atraumatic.      Mouth/Throat:      Lips: Pink.      Mouth: Mucous membranes are moist.   Eyes:      General: Lids are normal.      Conjunctiva/sclera: Conjunctivae normal.      Pupils: Pupils are equal, round, and reactive to light.   Cardiovascular:      Rate and Rhythm: Normal rate and regular rhythm.      Pulses: Normal pulses.      Heart sounds: Normal heart sounds.   Pulmonary:      Effort: Pulmonary effort is normal. No respiratory distress.      Breath sounds: Normal breath sounds. No decreased breath sounds or wheezing.   Abdominal:      General: Bowel sounds are normal. There is no distension.      Palpations: Abdomen is soft.      Tenderness: There is no abdominal tenderness. There is no guarding.       Musculoskeletal:      Cervical back: Neck supple.      Right lower leg: No edema.      Left lower leg:  No edema.   Skin:     General: Skin is warm and dry.      Coloration: Skin is pale.   Neurological:      General: No focal deficit present.      Mental Status: He is alert and oriented to person, place, and time.      Cranial Nerves: No cranial nerve deficit.   Psychiatric:         Attention and Perception: Attention and perception normal.         Mood and Affect: Affect normal. Mood is depressed.         Speech: Speech normal.         Behavior: Behavior normal. Behavior is cooperative.         Thought Content: Thought content does not include suicidal ideation. Thought content does not include suicidal plan.         Cognition and Memory: Memory normal.      Comments: Patient states depressed secondary to colostomy and further need for it, stated to provider that he has no intention of harming himself and is not suicidal at this time.          Fluids    Intake/Output Summary (Last 24 hours) at 5/12/2021 1127  Last data filed at 5/12/2021 0900  Gross per 24 hour   Intake 480 ml   Output 1075 ml   Net -595 ml     Laboratory          Imaging  [id number] VIEW   Final Result         No significant interval change.      DX-CHEST-LIMITED (1 VIEW)   Final Result         Diffuse interstitial prominence could relate to mild pulmonary edema or atypical infection      [id number] VIEW   Final Result      Increased colonic gas without definite bowel obstruction.      US-RENAL   Final Result      No evidence of hydronephrosis.      Lobulated kidneys bilaterally.      CT-ABDOMEN-PELVIS WITH   Final Result      1.  There is no evidence of small bowel obstruction.   2.  There is a left lower quadrant ostomy.   3.  There is fluid distention of the colon distal to the surgical material in the left mid descending colon extending all the way to the rectum. There is no pneumatosis or free air.   4.  There is cholelithiasis without biliary dilatation.   5.  There has been interval removal of the drainage catheter anterior to the spleen  "with minimal hypodense area in the anterior spleen again noted. There is no new fluid collection.      IR-US GUIDED PIV   Final Result    Ultrasound-guided PERIPHERAL IV INSERTION performed by    qualified nursing staff as above.      EC-ECHOCARDIOGRAM COMPLETE W/O CONT   Final Result      DX-LUMBAR SPINE-2 OR 3 VIEWS   Final Result      Moderate compression deformity of T11 is new compared to 2018.      Mild wedge deformity of T12 is unchanged.      Degenerative changes including facet arthropathy.      Mild retrolisthesis of L5 on S1 and L3 on L4.            Assessment/Plan  * Suicidal ideation- (present on admission)  Assessment & Plan  -Has history of MDD, recurrent with psychotic features. active SI w/ plan on admission  -Psychiatry following, recommended discontinuation of civil commitment - suspect possible malingering or secondary gain   - Evaluated bedside again and discussed with patient who states he \"wants to live\"   -Psych recommending sitter to ensure patient does not attempt actions that would extend his stay   5/5: Patient reports that he is telling staff that his suicidal ideations because he would like attention and not because he is actually having suicidal ideations.  5/10: placed on legal hold.  Patient is still on legal hold , pending placement to inpatient psych facility    Obstructive uropathy- (present on admission)  Assessment & Plan  -Trial off of flomax given his hypotension.  Discontinued 4/6/2021. No retention continue off Flomax    Paroxysmal atrial fibrillation (HCC)- (present on admission)  Assessment & Plan  -CHADSVASC 3 (HTN, CHF, DM).   -TTE Feb 2021: EF 60%.  -Continue metoprolol, digoxin, and eliquis.    Chronic venous insufficiency of lower extremity- (present on admission)  Assessment & Plan  -Continue compression stockings.    Chronic heart failure with preserved ejection fraction (HCC)- (present on admission)  Assessment & Plan  -Now stable.  -Pulmonary edema noted in " mid-March, was placed on IV diuresis though that was held when he developed a gram-negative gina bacteremia and sepsis.  Currently doing well from a respiratory standpoint, no shortness of breath, lungs clear to auscultation bilaterally, not requiring oxygen supplementation.  -Repeat echocardiogram showed improvement in his ejection fraction to 60%.    -Continue metoprolol succinate.  -Lisinopril held per cardiology recommendations due to hypotension.    Diabetes mellitus type 2, insulin dependent (HCC)- (present on admission)  Assessment & Plan  -A1c 5.2% on 2/14/2021.  -Continue diabetic diet.  -Continue lantus at current dose   -Hypoglycemia protocol in place if needed.      Major depressive disorder, recurrent, severe with psychotic features (HCC)- (present on admission)  Assessment & Plan  -Management as listed under 'anxiety and depression'.    Colostomy present (HCC)- (present on admission)  Assessment & Plan  -History of fall at home Nov 2020 on left side with perforated colon and splenic fluid collection/hematoma s/p segmental colectomy, colostomy, mobilization of the splenic flexure, wound vac, and I&D Nov 2020.  -Wound care consulted for ostomy care, but he was non-participatory. He is now engaging in ostomy care with his bedside RNs, encouraged to be observant but do it independently.  -Patient must be independently able to care for his ostomy before inpatient psychiatry will accept him.  Per surgery Dr. Desouza, ostomy reversal is high risk given patient's complexities. Surgery recommending ostomy reversal as an outpatient    Anxiety and depression- (present on admission)  Assessment & Plan  -Legal hold, awaiting acceptance at psych facility ([facility] will take in May).  -Continue paxil, klonopin & geodon.     Hypothyroidism- (present on admission)  Assessment & Plan  -TFTs in Feb were WNL. Continue levothyroxine at current dose.     Adrenal insufficiency (HCC)- (present on admission)  Assessment &  Plan  -Started PO hydrocortisone 4/7/2021. Continue florinef.   -Needs outpatient endocrinology follow up.  -Monitor potassium while on florinef. 4.4 on 4/7/2021.  -Repeat orthostatics periodically if symptomatic         Debility- (present on admission)  Assessment & Plan  -Cleared by PT/OT.  -Out of bed for each meal and ambulate in the hallways at least twice per shift.    Orthostatic hypotension- (present on admission)  Assessment & Plan  -Multifactorial including adrenal insufficiency, medications (beta blocker, CCB, tamsulosin), diabetic autonomic dysfunction, and venous insufficiency.  -EF is noted to be 60%, significant improvement from prior (previously 35%).  -Per cardiology, no further cardiac work-up needed.  Recommends follow-up with them as outpatient.  -Fall precautions.  -Continue to educate on the importance of slow positional changes.   - Started PO hydrocortisone 4/7/2021 for AI. Continuing florinef.   -Decreased toprolol which allowed for discontinuation of midodrine  Improved     Mixed hyperlipidemia- (present on admission)  Assessment & Plan  Lab Results   Component Value Date/Time    CHOLSTRLTOT 146 02/14/2021 01:30 AM    LDL 82 02/14/2021 01:30 AM    HDL 32 (A) 02/14/2021 01:30 AM    TRIGLYCERIDE 160 (H) 02/14/2021 01:30 AM   -Continue atorvastatin.     Lower limb amputation, great toe (HCC)- (present on admission)  Assessment & Plan  -History of, 2018.  Site well-healed.    Class 2 severe obesity with serious comorbidity and body mass index (BMI) of 35.0 to 35.9 in adult (HCC)- (present on admission)  Assessment & Plan  Body mass index is 35.19 kg/m².  -Could benefit from outpatient weight loss counseling which can be arranged through his PCP after hospital discharge.       VTE prophylaxis: Eliquis

## 2021-05-12 NOTE — DISCHARGE PLANNING
Filed petition to the court via [employer]. Waiting on verified petition.    Received verified petition from the court. Scanned copy of legal hold extension into pt's chart, sent copy to ELISA Sutton.

## 2021-05-12 NOTE — CARE PLAN
The patient is Stable - Low risk of patient condition declining or worsening  Problem: Safety  Goal: Will remain free from injury  Outcome: Progressing  Goal: Will remain free from falls  Outcome: Progressing  Summary of progress made towards problems/goals: Telesitter in place for SI, regular sitter was observing pt during telesitter downtime tonight.

## 2021-05-13 LAB — GLUCOSE BLD-MCNC: 97 MG/DL (ref 65–99)

## 2021-05-13 PROCEDURE — A9270 NON-COVERED ITEM OR SERVICE: HCPCS | Performed by: STUDENT IN AN ORGANIZED HEALTH CARE EDUCATION/TRAINING PROGRAM

## 2021-05-13 PROCEDURE — 700102 HCHG RX REV CODE 250 W/ 637 OVERRIDE(OP): Performed by: NURSE PRACTITIONER

## 2021-05-13 PROCEDURE — 700102 HCHG RX REV CODE 250 W/ 637 OVERRIDE(OP): Performed by: STUDENT IN AN ORGANIZED HEALTH CARE EDUCATION/TRAINING PROGRAM

## 2021-05-13 PROCEDURE — 99231 SBSQ HOSP IP/OBS SF/LOW 25: CPT | Performed by: STUDENT IN AN ORGANIZED HEALTH CARE EDUCATION/TRAINING PROGRAM

## 2021-05-13 PROCEDURE — A9270 NON-COVERED ITEM OR SERVICE: HCPCS | Performed by: HOSPITALIST

## 2021-05-13 PROCEDURE — A9270 NON-COVERED ITEM OR SERVICE: HCPCS | Performed by: NURSE PRACTITIONER

## 2021-05-13 PROCEDURE — 700102 HCHG RX REV CODE 250 W/ 637 OVERRIDE(OP): Performed by: HOSPITALIST

## 2021-05-13 PROCEDURE — 82962 GLUCOSE BLOOD TEST: CPT

## 2021-05-13 PROCEDURE — 770001 HCHG ROOM/CARE - MED/SURG/GYN PRIV*

## 2021-05-13 RX ORDER — SIMETHICONE 80 MG
80 TABLET,CHEWABLE ORAL 3 TIMES DAILY PRN
Status: DISCONTINUED | OUTPATIENT
Start: 2021-05-13 | End: 2021-05-28

## 2021-05-13 RX ADMIN — SIMETHICONE 80 MG: 80 TABLET, CHEWABLE ORAL at 17:40

## 2021-05-13 RX ADMIN — APIXABAN 5 MG: 5 TABLET, FILM COATED ORAL at 08:44

## 2021-05-13 RX ADMIN — CLONAZEPAM 1 MG: 1 TABLET ORAL at 17:40

## 2021-05-13 RX ADMIN — HYDROCORTISONE 5 MG: 10 TABLET ORAL at 17:41

## 2021-05-13 RX ADMIN — HYDROCORTISONE 10 MG: 10 TABLET ORAL at 05:47

## 2021-05-13 RX ADMIN — DOCUSATE SODIUM 50 MG AND SENNOSIDES 8.6 MG 2 TABLET: 8.6; 5 TABLET, FILM COATED ORAL at 05:46

## 2021-05-13 RX ADMIN — POTASSIUM CHLORIDE 20 MEQ: 20 TABLET, EXTENDED RELEASE ORAL at 05:46

## 2021-05-13 RX ADMIN — APIXABAN 5 MG: 5 TABLET, FILM COATED ORAL at 21:20

## 2021-05-13 RX ADMIN — SIMETHICONE 80 MG: 80 TABLET, CHEWABLE ORAL at 05:46

## 2021-05-13 RX ADMIN — METOPROLOL SUCCINATE 12.5 MG: 25 TABLET, EXTENDED RELEASE ORAL at 05:46

## 2021-05-13 RX ADMIN — LEVOTHYROXINE SODIUM 112 MCG: 0.11 TABLET ORAL at 05:46

## 2021-05-13 RX ADMIN — DIGOXIN 125 MCG: 125 TABLET ORAL at 17:40

## 2021-05-13 RX ADMIN — FLUDROCORTISONE ACETATE 0.1 MG: 0.1 TABLET ORAL at 05:46

## 2021-05-13 RX ADMIN — PAROXETINE HYDROCHLORIDE 20 MG: 20 TABLET, FILM COATED ORAL at 08:44

## 2021-05-13 RX ADMIN — ZIPRASIDONE HYDROCHLORIDE 60 MG: 60 CAPSULE ORAL at 08:44

## 2021-05-13 RX ADMIN — ATORVASTATIN CALCIUM 40 MG: 40 TABLET, FILM COATED ORAL at 17:40

## 2021-05-13 RX ADMIN — ZIPRASIDONE HYDROCHLORIDE 60 MG: 60 CAPSULE ORAL at 21:20

## 2021-05-13 RX ADMIN — CLONAZEPAM 0.5 MG: 0.5 TABLET ORAL at 08:44

## 2021-05-13 ASSESSMENT — ENCOUNTER SYMPTOMS
VOMITING: 0
ROS GI COMMENTS: COLOSTOMY IN PLACE
ABDOMINAL PAIN: 0
WEAKNESS: 0
CONSTIPATION: 0
COUGH: 0
NAUSEA: 0
DIARRHEA: 0
FEVER: 0
SHORTNESS OF BREATH: 0
NERVOUS/ANXIOUS: 0
DEPRESSION: 0

## 2021-05-13 NOTE — PROGRESS NOTES
Hospital Medicine Twice Weekly Progress Note    Date of Service  5/13/2021      Chief Complaint  Suicidal ideation    Hospital Course  Mr. Castro is a 56 year-old man with a past medical history significant for atrial fibrillation, secondary hypercoagulable state, diabetes mellitus complicated by diabetic neuropathy, chronic systolic CHF, colostomy placement, orthostatic hypotension (maintained on florinef), and dyslipidemia who presented to the ED on 2/9/2021 after he became dizzy and had a syncopal episode at home.  In the ED he was noted to be hypotensive and bradycardic. Cardiology was consulted and recommended stopping all AV sanket blocking medications.  He remained orthostatic during his hospitalization so midodrine was started.  An echocardiogram was obtained and showed mild mitral regurgitation and an EF of 60% which was improved from his previous echocardiogram obtained on 11/2020 (previously 35%).  CTAs of the head and neck were done and were unremarkable outside of distal bilateral ICA tortuosity.  Also during his hospitalization he expressed desire to kill himself.  He has a colostomy and has become overwhelmed by caring for it.  He was subsequently placed on a legal hold and psychiatry was consulted.  He is now pending [facility] placement at the 6-month db for maturity of his colostomy.    Interval Problem Update  Patient was seen and examined at bedside.  No acute events overnight.  No active complaints on a.m. rounds.  Patient is very pleasant on today's AM round and stated that he is feeling ok.   Psychiatry re-evaluated the patient on 5/12 and extended the legal hold which was restarted on 5/10. Tele sitter.   Pending placement to inpatient psych facility    Consultants/Specialty  Cardiology  Psychiatry  General surgery    Code Status  Full Code    Disposition  Placement to inpatient psych facility    Review of Systems  Review of Systems   Constitutional: Negative for fever and malaise/fatigue.    Respiratory: Negative for cough and shortness of breath.    Cardiovascular: Negative for chest pain and leg swelling.   Gastrointestinal: Negative for abdominal pain, constipation, diarrhea, nausea and vomiting.        Colostomy in place    Neurological: Negative for weakness.   Psychiatric/Behavioral: Negative for depression and suicidal ideas. The patient is not nervous/anxious.    All other systems reviewed and are negative.     Physical Exam  Temp:  [36 °C (96.8 °F)-36.8 °C (98.3 °F)] 36 °C (96.8 °F)  Pulse:  [74-79] 74  Resp:  [17-18] 17  BP: (120-154)/(63-87) 120/65  SpO2:  [95 %-97 %] 96 %    Physical Exam  Vitals and nursing note reviewed.   Constitutional:       General: He is awake. He is not in acute distress.     Appearance: Normal appearance. He is well-developed. He is not ill-appearing.   HENT:      Head: Normocephalic and atraumatic.      Mouth/Throat:      Lips: Pink.      Mouth: Mucous membranes are moist.   Eyes:      General: Lids are normal.      Conjunctiva/sclera: Conjunctivae normal.      Pupils: Pupils are equal, round, and reactive to light.   Cardiovascular:      Rate and Rhythm: Normal rate and regular rhythm.      Pulses: Normal pulses.      Heart sounds: Normal heart sounds.   Pulmonary:      Effort: Pulmonary effort is normal. No respiratory distress.      Breath sounds: Normal breath sounds. No decreased breath sounds or wheezing.   Abdominal:      General: Bowel sounds are normal. There is no distension.      Palpations: Abdomen is soft.      Tenderness: There is no abdominal tenderness. There is no guarding.       Musculoskeletal:      Cervical back: Neck supple.      Right lower leg: No edema.      Left lower leg: No edema.   Skin:     General: Skin is warm and dry.      Coloration: Skin is pale.   Neurological:      General: No focal deficit present.      Mental Status: He is alert and oriented to person, place, and time.      Cranial Nerves: No cranial nerve deficit.    Psychiatric:         Attention and Perception: Attention and perception normal.         Mood and Affect: Affect normal. Mood is depressed.         Speech: Speech normal.         Behavior: Behavior normal. Behavior is cooperative.         Thought Content: Thought content does not include suicidal ideation. Thought content does not include suicidal plan.         Cognition and Memory: Memory normal.         Fluids    Intake/Output Summary (Last 24 hours) at 5/13/2021 1006  Last data filed at 5/12/2021 1800  Gross per 24 hour   Intake 240 ml   Output 995 ml   Net -755 ml     Laboratory          Imaging  [id number]-5 VIEW   Final Result         No significant interval change.      DX-CHEST-LIMITED (1 VIEW)   Final Result         Diffuse interstitial prominence could relate to mild pulmonary edema or atypical infection      SA-[id number]-3 VIEW   Final Result      Increased colonic gas without definite bowel obstruction.      US-RENAL   Final Result      No evidence of hydronephrosis.      Lobulated kidneys bilaterally.      CT-ABDOMEN-PELVIS WITH   Final Result      1.  There is no evidence of small bowel obstruction.   2.  There is a left lower quadrant ostomy.   3.  There is fluid distention of the colon distal to the surgical material in the left mid descending colon extending all the way to the rectum. There is no pneumatosis or free air.   4.  There is cholelithiasis without biliary dilatation.   5.  There has been interval removal of the drainage catheter anterior to the spleen with minimal hypodense area in the anterior spleen again noted. There is no new fluid collection.      IR-US GUIDED PIV   Final Result    Ultrasound-guided PERIPHERAL IV INSERTION performed by    qualified nursing staff as above.      EC-ECHOCARDIOGRAM COMPLETE W/O CONT   Final Result      DX-LUMBAR SPINE-2 OR 3 VIEWS   Final Result      Moderate compression deformity of T11 is new compared to 2018.      Mild wedge deformity of T12 is  "unchanged.      Degenerative changes including facet arthropathy.      Mild retrolisthesis of L5 on S1 and L3 on L4.            Assessment/Plan  * Suicidal ideation- (present on admission)  Assessment & Plan  -Has history of MDD, recurrent with psychotic features. active SI w/ plan on admission  -Psychiatry following, recommended discontinuation of civil commitment - suspect possible malingering or secondary gain   - Evaluated bedside again and discussed with patient who states he \"wants to live\"   -Psych recommending sitter to ensure patient does not attempt actions that would extend his stay   5/5: Patient reports that he is telling staff that his suicidal ideations because he would like attention and not because he is actually having suicidal ideations.  5/10: placed on legal hold.  Patient is still on legal hold , pending placement to inpatient psych facility    Obstructive uropathy- (present on admission)  Assessment & Plan  -Trial off of flomax given his hypotension.  Discontinued 4/6/2021. No retention continue off Flomax    Paroxysmal atrial fibrillation (HCC)- (present on admission)  Assessment & Plan  -CHADSVASC 3 (HTN, CHF, DM).   -TTE Feb 2021: EF 60%.  -Continue metoprolol, digoxin, and eliquis.    Chronic venous insufficiency of lower extremity- (present on admission)  Assessment & Plan  -Continue compression stockings.    Chronic heart failure with preserved ejection fraction (HCC)- (present on admission)  Assessment & Plan  -Now stable.  -Pulmonary edema noted in mid-March, was placed on IV diuresis though that was held when he developed a gram-negative gina bacteremia and sepsis.  Currently doing well from a respiratory standpoint, no shortness of breath, lungs clear to auscultation bilaterally, not requiring oxygen supplementation.  -Repeat echocardiogram showed improvement in his ejection fraction to 60%.    -Continue metoprolol succinate.  -Lisinopril held per cardiology recommendations due to " hypotension.    Diabetes mellitus type 2, insulin dependent (HCC)- (present on admission)  Assessment & Plan  -A1c 5.2% on 2/14/2021.  -Continue diabetic diet.  -Continue lantus at current dose   -Hypoglycemia protocol in place if needed.      Major depressive disorder, recurrent, severe with psychotic features (HCC)- (present on admission)  Assessment & Plan  -Management as listed under 'anxiety and depression'.    Colostomy present (HCC)- (present on admission)  Assessment & Plan  -History of fall at home Nov 2020 on left side with perforated colon and splenic fluid collection/hematoma s/p segmental colectomy, colostomy, mobilization of the splenic flexure, wound vac, and I&D Nov 2020.  -Wound care consulted for ostomy care, but he was non-participatory. He is now engaging in ostomy care with his bedside RNs, encouraged to be observant but do it independently.  -Patient must be independently able to care for his ostomy before inpatient psychiatry will accept him.  Per surgery Dr. Desouza, ostomy reversal is high risk given patient's complexities. Surgery recommending ostomy reversal as an outpatient    Anxiety and depression- (present on admission)  Assessment & Plan  -Legal hold, awaiting acceptance at psych facility ([facility] will take in May).  -Continue paxil, klonopin & geodon.     Hypothyroidism- (present on admission)  Assessment & Plan  -TFTs in Feb were WNL. Continue levothyroxine at current dose.     Adrenal insufficiency (HCC)- (present on admission)  Assessment & Plan  -Started PO hydrocortisone 4/7/2021. Continue florinef.   -Needs outpatient endocrinology follow up.  -Monitor potassium while on florinef. 4.4 on 4/7/2021.  -Repeat orthostatics periodically if symptomatic         Debility- (present on admission)  Assessment & Plan  -Cleared by PT/OT.  -Out of bed for each meal and ambulate in the hallways at least twice per shift.    Orthostatic hypotension- (present on admission)  Assessment &  Plan  -Multifactorial including adrenal insufficiency, medications (beta blocker, CCB, tamsulosin), diabetic autonomic dysfunction, and venous insufficiency.  -EF is noted to be 60%, significant improvement from prior (previously 35%).  -Per cardiology, no further cardiac work-up needed.  Recommends follow-up with them as outpatient.  -Fall precautions.  -Continue to educate on the importance of slow positional changes.   - Started PO hydrocortisone 4/7/2021 for AI. Continuing florinef.   -Decreased toprolol which allowed for discontinuation of midodrine  Improved     Mixed hyperlipidemia- (present on admission)  Assessment & Plan  Lab Results   Component Value Date/Time    CHOLSTRLTOT 146 02/14/2021 01:30 AM    LDL 82 02/14/2021 01:30 AM    HDL 32 (A) 02/14/2021 01:30 AM    TRIGLYCERIDE 160 (H) 02/14/2021 01:30 AM   -Continue atorvastatin.     Lower limb amputation, great toe (HCC)- (present on admission)  Assessment & Plan  -History of, 2018.  Site well-healed.    Class 2 severe obesity with serious comorbidity and body mass index (BMI) of 35.0 to 35.9 in adult (HCC)- (present on admission)  Assessment & Plan  Body mass index is 35.19 kg/m².  -Could benefit from outpatient weight loss counseling which can be arranged through his PCP after hospital discharge.       VTE prophylaxis: Eliquis

## 2021-05-13 NOTE — DISCHARGE PLANNING
Agency/Facility Name: [facility]  Outcome: Left message, awaiting call back.

## 2021-05-13 NOTE — CARE PLAN
On legal hold for SI. Telesitter in room & Q15min checks by staff. No call light- but asks if he needs something. Turns self well but instructed to request assistance if needed. Colostomy- can manage but will need assistance in disposing of the contents.     The patient is Stable - Low risk of patient condition declining or worsening    Problem: Safety  Goal: Will remain free from injury  Outcome: Progressing  Goal: Will remain free from falls  Outcome: Progressing     Problem: Potential for Self Harm  Goal: No active self-harm  Outcome: Progressing     Problem: Ineffective Coping  Goal: No active self-harm  Outcome: Progressing     Problem: Discharge Barriers/Planning  Goal: Patient's continuum of care needs will be met  Outcome: Not Met

## 2021-05-14 PROCEDURE — A9270 NON-COVERED ITEM OR SERVICE: HCPCS | Performed by: STUDENT IN AN ORGANIZED HEALTH CARE EDUCATION/TRAINING PROGRAM

## 2021-05-14 PROCEDURE — 700102 HCHG RX REV CODE 250 W/ 637 OVERRIDE(OP): Performed by: STUDENT IN AN ORGANIZED HEALTH CARE EDUCATION/TRAINING PROGRAM

## 2021-05-14 PROCEDURE — 700102 HCHG RX REV CODE 250 W/ 637 OVERRIDE(OP): Performed by: NURSE PRACTITIONER

## 2021-05-14 PROCEDURE — A9270 NON-COVERED ITEM OR SERVICE: HCPCS | Performed by: NURSE PRACTITIONER

## 2021-05-14 PROCEDURE — 700102 HCHG RX REV CODE 250 W/ 637 OVERRIDE(OP): Performed by: HOSPITALIST

## 2021-05-14 PROCEDURE — 99231 SBSQ HOSP IP/OBS SF/LOW 25: CPT | Performed by: STUDENT IN AN ORGANIZED HEALTH CARE EDUCATION/TRAINING PROGRAM

## 2021-05-14 PROCEDURE — 770001 HCHG ROOM/CARE - MED/SURG/GYN PRIV*

## 2021-05-14 PROCEDURE — 90832 PSYTX W PT 30 MINUTES: CPT | Performed by: PSYCHOLOGIST

## 2021-05-14 PROCEDURE — A9270 NON-COVERED ITEM OR SERVICE: HCPCS | Performed by: HOSPITALIST

## 2021-05-14 RX ADMIN — POTASSIUM CHLORIDE 20 MEQ: 20 TABLET, EXTENDED RELEASE ORAL at 04:08

## 2021-05-14 RX ADMIN — ATORVASTATIN CALCIUM 40 MG: 40 TABLET, FILM COATED ORAL at 17:53

## 2021-05-14 RX ADMIN — CLONAZEPAM 1 MG: 1 TABLET ORAL at 17:53

## 2021-05-14 RX ADMIN — LEVOTHYROXINE SODIUM 112 MCG: 0.11 TABLET ORAL at 04:09

## 2021-05-14 RX ADMIN — DIGOXIN 125 MCG: 125 TABLET ORAL at 17:53

## 2021-05-14 RX ADMIN — PAROXETINE HYDROCHLORIDE 20 MG: 20 TABLET, FILM COATED ORAL at 07:38

## 2021-05-14 RX ADMIN — HYDROCORTISONE 5 MG: 10 TABLET ORAL at 16:22

## 2021-05-14 RX ADMIN — HYDROCORTISONE 10 MG: 10 TABLET ORAL at 04:08

## 2021-05-14 RX ADMIN — CLONAZEPAM 0.5 MG: 0.5 TABLET ORAL at 07:38

## 2021-05-14 RX ADMIN — ZIPRASIDONE HYDROCHLORIDE 60 MG: 60 CAPSULE ORAL at 09:00

## 2021-05-14 RX ADMIN — APIXABAN 5 MG: 5 TABLET, FILM COATED ORAL at 07:38

## 2021-05-14 RX ADMIN — FLUDROCORTISONE ACETATE 0.1 MG: 0.1 TABLET ORAL at 04:09

## 2021-05-14 RX ADMIN — ZIPRASIDONE HYDROCHLORIDE 60 MG: 60 CAPSULE ORAL at 20:42

## 2021-05-14 RX ADMIN — METOPROLOL SUCCINATE 12.5 MG: 25 TABLET, EXTENDED RELEASE ORAL at 04:09

## 2021-05-14 RX ADMIN — DOCUSATE SODIUM 50 MG AND SENNOSIDES 8.6 MG 2 TABLET: 8.6; 5 TABLET, FILM COATED ORAL at 04:08

## 2021-05-14 ASSESSMENT — ENCOUNTER SYMPTOMS
WEAKNESS: 0
DIARRHEA: 0
VOMITING: 0
FEVER: 0
NERVOUS/ANXIOUS: 0
ROS GI COMMENTS: COLOSTOMY IN PLACE
COUGH: 0
NAUSEA: 0
DEPRESSION: 0
ABDOMINAL PAIN: 0
CONSTIPATION: 0
SHORTNESS OF BREATH: 0

## 2021-05-14 NOTE — DISCHARGE PLANNING
@1146  Agency/Facility Name: [facility]  Spoke To: Jorgito  Outcome: Per Jorgito referral is currently being reviewed.    @3235  Agency/Facility Name: [facility]  Outcome: Voice mail left regarding referral. Requested a call back.

## 2021-05-14 NOTE — PROGRESS NOTES
"When passing by room patient called this CNA in to say \"Hey a staff member left some scissors on my bedside table and I am not suppose to have them there\" prior when asking about having SI he said \"I will slit my throat if I was going to do something\", CNA took scissors away and informed nurse of situation.  "

## 2021-05-14 NOTE — DISCHARGE PLANNING
Anticipated Discharge Disposition:   Inpatient Psych    Action:   Per chart review, pt is on Legal Hold, medically cleared for discahrge to inpatient psych. Pending acceptance to [facility]. DPA to follow up.    Barriers to Discharge:   Inpatient psych acceptance and bed availability.    Plan:   Hospital Care Management will continue to follow and assist with discharge planning needs.

## 2021-05-14 NOTE — CARE PLAN
Problem: Communication  Goal: The ability to communicate needs accurately and effectively will improve  Outcome: Progressing     Problem: Safety  Goal: Will remain free from injury  Outcome: Progressing  Goal: Will remain free from falls  Outcome: Progressing     Problem: Infection  Goal: Will remain free from infection  Outcome: Progressing     Problem: Venous Thromboembolism (VTW)/Deep Vein Thrombosis (DVT) Prevention:  Goal: Patient will participate in Venous Thrombosis (VTE)/Deep Vein Thrombosis (DVT)Prevention Measures  Outcome: Progressing     Problem: Bowel/Gastric:  Goal: Normal bowel function is maintained or improved  Outcome: Progressing  Goal: Will not experience complications related to bowel motility  Outcome: Progressing     Problem: Knowledge Deficit  Goal: Knowledge of disease process/condition, treatment plan, diagnostic tests, and medications will improve  Outcome: Progressing  Goal: Knowledge of the prescribed therapeutic regimen will improve  Outcome: Progressing     Problem: Discharge Barriers/Planning  Goal: Patient's continuum of care needs will be met  Outcome: Progressing     Problem: Fluid Volume:  Goal: Will maintain balanced intake and output  Outcome: Progressing     Problem: Psychosocial Needs:  Goal: Level of anxiety will decrease  Outcome: Progressing     Problem: Mobility  Goal: Risk for activity intolerance will decrease  Outcome: Progressing     Problem: Pain Management  Goal: Pain level will decrease to patient's comfort goal  Outcome: Progressing     Problem: Skin Integrity  Goal: Risk for impaired skin integrity will decrease  Outcome: Progressing     Problem: Potential for Self Harm  Goal: Achieve stable functioning  Outcome: Progressing  Goal: Abstinence  Outcome: Progressing  Goal: No active self-harm  Outcome: Progressing  Goal: Symptom reduction and improved functioning  Outcome: Progressing  Goal: Effective symptom management  Outcome: Progressing  Goal: Resolve acute  exacerbation and return to psychiatric baseline  Outcome: Progressing     Problem: Ineffective Coping  Goal: Achieve stable functioning  Outcome: Progressing  Goal: Abstinence  Outcome: Progressing  Goal: No active self-harm  Outcome: Progressing  Goal: Symptom reduction and improved functioning  Outcome: Progressing  Goal: Effective symptom management  Outcome: Progressing  Goal: Resolve acute exacerbation and return to psychiatric baseline  Outcome: Progressing     Problem: Respiratory:  Goal: Respiratory status will improve  Outcome: Progressing     Problem: Urinary Elimination:  Goal: Ability to reestablish a normal urinary elimination pattern will improve  Outcome: Progressing     Problem: Safety - Medical Restraint  Goal: Remains free of injury from restraints (Restraint for Interference with Medical Device)  Outcome: Progressing  Goal: Free from restraint(s) (Restraint for Interference with Medical Device)  Outcome: Progressing     Problem: Knowledge Deficit - Standard  Goal: Patient and family/care givers will demonstrate understanding of plan of care, disease process/condition, diagnostic tests and medications  Outcome: Progressing     Problem: Depression  Goal: Patient and family/caregiver will verbalize accurate information about at least two of the possible causes of depression, three-four of the signs and symptoms of depression  Outcome: Progressing     Problem: Provide Safe Environment  Goal: Suicide environmental safety, protocols, policies, and practices will be implemented  Outcome: Progressing     Problem: Psychosocial  Goal: Patient's ability to identify and develop effective coping behaviors will improve  Outcome: Progressing  Goal: Patient's ability to identify and utilize available support systems will improve  Outcome: Progressing     Problem: Hemodynamics  Goal: Patient's hemodynamics, fluid balance and neurologic status will be stable or improve  Outcome: Progressing     Problem: Fluid  Volume  Goal: Fluid volume balance will be maintained  Outcome: Progressing     Problem: Urinary - Renal Perfusion  Goal: Ability to achieve and maintain adequate renal perfusion and functioning will improve  Outcome: Progressing     Problem: Respiratory  Goal: Patient will achieve/maintain optimum respiratory ventilation and gas exchange  Outcome: Progressing     Problem: Mechanical Ventilation  Goal: Safe management of artificial airway and ventilation  Outcome: Progressing  Goal: Successful weaning off mechanical ventilator, spontaneously maintains adequate gas exchange  Outcome: Progressing  Goal: Patient will be able to express needs and understand communication  Outcome: Progressing     Problem: Physical Regulation  Goal: Diagnostic test results will improve  Outcome: Progressing  Goal: Signs and symptoms of infection will decrease  Outcome: Progressing   The patient is Stable - Low risk of patient condition declining or worsening         Progress made toward(s) clinical / shift goals:  Verbalizes no suicidal ideation, complying with therapy.

## 2021-05-14 NOTE — CARE PLAN
The patient is Stable - Low risk of patient condition declining or worsening         Progress made toward(s) clinical / shift goals:  pt will remain free of falls    Patient is not progressing towards the following goals:

## 2021-05-14 NOTE — PROGRESS NOTES
Hospital Medicine Twice Weekly Progress Note    Date of Service  5/14/2021      Chief Complaint  Suicidal ideation    Hospital Course  Mr. Castro is a 56 year-old man with a past medical history significant for atrial fibrillation, secondary hypercoagulable state, diabetes mellitus complicated by diabetic neuropathy, chronic systolic CHF, colostomy placement, orthostatic hypotension (maintained on florinef), and dyslipidemia who presented to the ED on 2/9/2021 after he became dizzy and had a syncopal episode at home.  In the ED he was noted to be hypotensive and bradycardic. Cardiology was consulted and recommended stopping all AV sanket blocking medications.  He remained orthostatic during his hospitalization so midodrine was started.  An echocardiogram was obtained and showed mild mitral regurgitation and an EF of 60% which was improved from his previous echocardiogram obtained on 11/2020 (previously 35%).  CTAs of the head and neck were done and were unremarkable outside of distal bilateral ICA tortuosity.  Also during his hospitalization he expressed desire to kill himself.  He has a colostomy and has become overwhelmed by caring for it.  He was subsequently placed on a legal hold and psychiatry was consulted.  He is now pending [facility] placement at the 6-month db for maturity of his colostomy.    Interval Problem Update  Patient was seen and examined at bedside.  No acute events overnight.  No active complaints on a.m. rounds.  Patient is very pleasant on today's AM round and stated that he is feeling ok.   Psychiatry re-evaluated the patient on 5/12 and extended the legal hold which was restarted on 5/10. Tele sitter.   Pending placement to inpatient psych facility    Consultants/Specialty  Cardiology  Psychiatry  General surgery    Code Status  Full Code    Disposition  Placement to inpatient psych facility    Review of Systems  Review of Systems   Constitutional: Negative for fever and malaise/fatigue.    Respiratory: Negative for cough and shortness of breath.    Cardiovascular: Negative for chest pain and leg swelling.   Gastrointestinal: Negative for abdominal pain, constipation, diarrhea, nausea and vomiting.        Colostomy in place    Neurological: Negative for weakness.   Psychiatric/Behavioral: Negative for depression and suicidal ideas. The patient is not nervous/anxious.    All other systems reviewed and are negative.     Physical Exam  Temp:  [36.1 °C (96.9 °F)-36.4 °C (97.6 °F)] 36.4 °C (97.6 °F)  Pulse:  [68-96] 81  Resp:  [17-20] 20  BP: (125-153)/(63-90) 134/63  SpO2:  [95 %-99 %] 96 %    Physical Exam  Vitals and nursing note reviewed.   Constitutional:       General: He is awake. He is not in acute distress.     Appearance: Normal appearance. He is well-developed. He is not ill-appearing.   HENT:      Head: Normocephalic and atraumatic.      Mouth/Throat:      Lips: Pink.      Mouth: Mucous membranes are moist.   Eyes:      General: Lids are normal.      Conjunctiva/sclera: Conjunctivae normal.      Pupils: Pupils are equal, round, and reactive to light.   Cardiovascular:      Rate and Rhythm: Normal rate and regular rhythm.      Pulses: Normal pulses.      Heart sounds: Normal heart sounds.   Pulmonary:      Effort: Pulmonary effort is normal. No respiratory distress.      Breath sounds: Normal breath sounds. No decreased breath sounds or wheezing.   Abdominal:      General: Bowel sounds are normal. There is no distension.      Palpations: Abdomen is soft.      Tenderness: There is no abdominal tenderness. There is no guarding.       Musculoskeletal:      Cervical back: Neck supple.      Right lower leg: No edema.      Left lower leg: No edema.   Skin:     General: Skin is warm and dry.      Coloration: Skin is pale.   Neurological:      General: No focal deficit present.      Mental Status: He is alert and oriented to person, place, and time.      Cranial Nerves: No cranial nerve deficit.    Psychiatric:         Attention and Perception: Attention and perception normal.         Mood and Affect: Affect normal. Mood is depressed.         Speech: Speech normal.         Behavior: Behavior normal. Behavior is cooperative.         Thought Content: Thought content does not include suicidal ideation. Thought content does not include suicidal plan.         Cognition and Memory: Memory normal.         Fluids    Intake/Output Summary (Last 24 hours) at 5/14/2021 1001  Last data filed at 5/14/2021 0400  Gross per 24 hour   Intake --   Output 1000 ml   Net -1000 ml     Laboratory          Imaging  [id number] VIEW   Final Result         No significant interval change.      DX-CHEST-LIMITED (1 VIEW)   Final Result         Diffuse interstitial prominence could relate to mild pulmonary edema or atypical infection      [id number] VIEW   Final Result      Increased colonic gas without definite bowel obstruction.      US-RENAL   Final Result      No evidence of hydronephrosis.      Lobulated kidneys bilaterally.      CT-ABDOMEN-PELVIS WITH   Final Result      1.  There is no evidence of small bowel obstruction.   2.  There is a left lower quadrant ostomy.   3.  There is fluid distention of the colon distal to the surgical material in the left mid descending colon extending all the way to the rectum. There is no pneumatosis or free air.   4.  There is cholelithiasis without biliary dilatation.   5.  There has been interval removal of the drainage catheter anterior to the spleen with minimal hypodense area in the anterior spleen again noted. There is no new fluid collection.      IR-US GUIDED PIV   Final Result    Ultrasound-guided PERIPHERAL IV INSERTION performed by    qualified nursing staff as above.      EC-ECHOCARDIOGRAM COMPLETE W/O CONT   Final Result      DX-LUMBAR SPINE-2 OR 3 VIEWS   Final Result      Moderate compression deformity of T11 is new compared to 2018.      Mild wedge deformity of T12 is  "unchanged.      Degenerative changes including facet arthropathy.      Mild retrolisthesis of L5 on S1 and L3 on L4.            Assessment/Plan  * Suicidal ideation- (present on admission)  Assessment & Plan  -Has history of MDD, recurrent with psychotic features. active SI w/ plan on admission  -Psychiatry following, recommended discontinuation of civil commitment - suspect possible malingering or secondary gain   - Evaluated bedside again and discussed with patient who states he \"wants to live\"   -Psych recommending sitter to ensure patient does not attempt actions that would extend his stay   5/5: Patient reports that he is telling staff that his suicidal ideations because he would like attention and not because he is actually having suicidal ideations.  5/10: placed on legal hold.  Patient is still on legal hold , pending placement to inpatient psych facility    Obstructive uropathy- (present on admission)  Assessment & Plan  -Trial off of flomax given his hypotension.  Discontinued 4/6/2021. No retention continue off Flomax    Paroxysmal atrial fibrillation (HCC)- (present on admission)  Assessment & Plan  -CHADSVASC 3 (HTN, CHF, DM).   -TTE Feb 2021: EF 60%.  -Continue metoprolol, digoxin, and eliquis.    Chronic venous insufficiency of lower extremity- (present on admission)  Assessment & Plan  -Continue compression stockings.    Chronic heart failure with preserved ejection fraction (HCC)- (present on admission)  Assessment & Plan  -Now stable.  -Pulmonary edema noted in mid-March, was placed on IV diuresis though that was held when he developed a gram-negative gina bacteremia and sepsis.  Currently doing well from a respiratory standpoint, no shortness of breath, lungs clear to auscultation bilaterally, not requiring oxygen supplementation.  -Repeat echocardiogram showed improvement in his ejection fraction to 60%.    -Continue metoprolol succinate.  -Lisinopril held per cardiology recommendations due to " hypotension.    Diabetes mellitus type 2, insulin dependent (HCC)- (present on admission)  Assessment & Plan  -A1c 5.2% on 2/14/2021.  -Continue diabetic diet.  -Continue lantus at current dose   -Hypoglycemia protocol in place if needed.      Major depressive disorder, recurrent, severe with psychotic features (HCC)- (present on admission)  Assessment & Plan  -Management as listed under 'anxiety and depression'.    Colostomy present (HCC)- (present on admission)  Assessment & Plan  -History of fall at home Nov 2020 on left side with perforated colon and splenic fluid collection/hematoma s/p segmental colectomy, colostomy, mobilization of the splenic flexure, wound vac, and I&D Nov 2020.  -Wound care consulted for ostomy care, but he was non-participatory. He is now engaging in ostomy care with his bedside RNs, encouraged to be observant but do it independently.  -Patient must be independently able to care for his ostomy before inpatient psychiatry will accept him.  Per surgery Dr. Desouza, ostomy reversal is high risk given patient's complexities. Surgery recommending ostomy reversal as an outpatient    Anxiety and depression- (present on admission)  Assessment & Plan  -Legal hold, awaiting acceptance at psych facility ([facility] will take in May).  -Continue paxil, klonopin & geodon.     Hypothyroidism- (present on admission)  Assessment & Plan  -TFTs in Feb were WNL. Continue levothyroxine at current dose.     Adrenal insufficiency (HCC)- (present on admission)  Assessment & Plan  -Started PO hydrocortisone 4/7/2021. Continue florinef.   -Needs outpatient endocrinology follow up.  -Monitor potassium while on florinef. 4.4 on 4/7/2021.  -Repeat orthostatics periodically if symptomatic         Debility- (present on admission)  Assessment & Plan  -Cleared by PT/OT.  -Out of bed for each meal and ambulate in the hallways at least twice per shift.    Orthostatic hypotension- (present on admission)  Assessment &  Plan  -Multifactorial including adrenal insufficiency, medications (beta blocker, CCB, tamsulosin), diabetic autonomic dysfunction, and venous insufficiency.  -EF is noted to be 60%, significant improvement from prior (previously 35%).  -Per cardiology, no further cardiac work-up needed.  Recommends follow-up with them as outpatient.  -Fall precautions.  -Continue to educate on the importance of slow positional changes.   - Started PO hydrocortisone 4/7/2021 for AI. Continuing florinef.   -Decreased toprolol which allowed for discontinuation of midodrine  Improved     Mixed hyperlipidemia- (present on admission)  Assessment & Plan  Lab Results   Component Value Date/Time    CHOLSTRLTOT 146 02/14/2021 01:30 AM    LDL 82 02/14/2021 01:30 AM    HDL 32 (A) 02/14/2021 01:30 AM    TRIGLYCERIDE 160 (H) 02/14/2021 01:30 AM   -Continue atorvastatin.     Lower limb amputation, great toe (HCC)- (present on admission)  Assessment & Plan  -History of, 2018.  Site well-healed.    Class 2 severe obesity with serious comorbidity and body mass index (BMI) of 35.0 to 35.9 in adult (HCC)- (present on admission)  Assessment & Plan  Body mass index is 35.19 kg/m².  -Could benefit from outpatient weight loss counseling which can be arranged through his PCP after hospital discharge.       VTE prophylaxis: Eliquis

## 2021-05-14 NOTE — PROGRESS NOTES
Pharmacy Pharmacotherapy Consult for LOS >30 days    Admit Date: 2/9/2021      Medications were reviewed for appropriateness and ongoing need.     Current Facility-Administered Medications   Medication Dose Route Frequency Provider Last Rate Last Admin   • simethicone (MYLICON) chewable tab 80 mg  80 mg Oral TID PRN Brennan Jarrett M.D.       • LORazepam (ATIVAN) tablet 0.5 mg  0.5 mg Oral Q4HRS PRN Brennan Jarrett M.D.       • metoprolol SR (TOPROL XL) tablet 12.5 mg  12.5 mg Oral Q DAY Brennan Jarrett M.D.   12.5 mg at 05/13/21 0546   • senna-docusate (PERICOLACE or SENOKOT S) 8.6-50 MG per tablet 2 tablet  2 tablet Oral DAILY Brennan Jarrett M.D.   2 tablet at 05/13/21 0546   • glucose 4 g chewable tablet 16 g  16 g Oral Q15 MIN PRN Jose De Jesus Villela A.P.R.N.        And   • dextrose 50% (D50W) injection 50 mL  50 mL Intravenous Q15 MIN PRN Jose De Jesus Villela A.P.R.N.       • hydrocortisone (CORTEF) tablet 5 mg  5 mg Oral Daily-1500 Jose De Jesus Villela A.P.R.N.   5 mg at 05/12/21 1408   • hydrocortisone (CORTEF) tablet 10 mg  10 mg Oral QAM Jose De Jesus Villela A.P.R.N.   10 mg at 05/13/21 0547   • fludrocortisone (FLORINEF) tablet 0.1 mg  0.1 mg Oral [facility] Jose De Jesus Villela, A.P.R.N.   0.1 mg at 05/13/21 0546   • potassium chloride SA (Kdur) tablet 20 mEq  20 mEq Oral DAILY Trent Decker M.D.   20 mEq at 05/13/21 0546   • PARoxetine (PAXIL) tablet 20 mg  20 mg Oral DAILY Trent Decker M.D.   20 mg at 05/13/21 0844   • clonazePAM (KLONOPIN) tablet 0.5 mg  0.5 mg Oral DAILY Trent Decker M.D.   0.5 mg at 05/13/21 0844   • clonazePAM (KLONOPIN) tablet 1 mg  1 mg Oral Q EVENING Trent Decker M.D.   1 mg at 05/12/21 1744   • ziprasidone (GEODON) capsule 60 mg  60 mg Oral BID Trent Decker M.D.   60 mg at 05/13/21 0844   • apixaban (ELIQUIS) tablet 5 mg  5 mg Oral BID Trent Decker M.D.   5 mg at 05/13/21 0844   • acetaminophen (Tylenol) tablet 650 mg  650 mg Oral Q4HRS PRN Trent Decker M.D.   650 mg at 03/31/21 0843   • digoxin (LANOXIN) tablet  125 mcg  125 mcg Oral DAILY AT 1800 Trent Decker M.D.   125 mcg at 05/12/21 1744   • ondansetron (ZOFRAN ODT) dispertab 4 mg  4 mg Oral Q4HRS PRN Carmel Phillips M.D.   4 mg at 04/02/21 0241   • atorvastatin (LIPITOR) tablet 40 mg  40 mg Oral Q EVENING Carmel Phillips M.D.   40 mg at 05/12/21 1744   • levothyroxine (SYNTHROID) tablet 112 mcg  112 mcg Oral AM ES Carmel Phillips M.D.   112 mcg at 05/13/21 0546       Recommendations:  Discussed MAR with MD.  Ryley held x 2 days and patient is euglycemic - lantus DC.  Scheduled simethicone refused, changed to PRN.    Arlin Woodward, PharmD, BCCCP

## 2021-05-15 PROCEDURE — 700102 HCHG RX REV CODE 250 W/ 637 OVERRIDE(OP): Performed by: NURSE PRACTITIONER

## 2021-05-15 PROCEDURE — A9270 NON-COVERED ITEM OR SERVICE: HCPCS | Performed by: STUDENT IN AN ORGANIZED HEALTH CARE EDUCATION/TRAINING PROGRAM

## 2021-05-15 PROCEDURE — A9270 NON-COVERED ITEM OR SERVICE: HCPCS | Performed by: HOSPITALIST

## 2021-05-15 PROCEDURE — 700102 HCHG RX REV CODE 250 W/ 637 OVERRIDE(OP): Performed by: HOSPITALIST

## 2021-05-15 PROCEDURE — 700102 HCHG RX REV CODE 250 W/ 637 OVERRIDE(OP): Performed by: STUDENT IN AN ORGANIZED HEALTH CARE EDUCATION/TRAINING PROGRAM

## 2021-05-15 PROCEDURE — 99231 SBSQ HOSP IP/OBS SF/LOW 25: CPT | Performed by: STUDENT IN AN ORGANIZED HEALTH CARE EDUCATION/TRAINING PROGRAM

## 2021-05-15 PROCEDURE — 770001 HCHG ROOM/CARE - MED/SURG/GYN PRIV*

## 2021-05-15 PROCEDURE — A9270 NON-COVERED ITEM OR SERVICE: HCPCS | Performed by: NURSE PRACTITIONER

## 2021-05-15 RX ADMIN — ZIPRASIDONE HYDROCHLORIDE 60 MG: 60 CAPSULE ORAL at 08:27

## 2021-05-15 RX ADMIN — POTASSIUM CHLORIDE 20 MEQ: 20 TABLET, EXTENDED RELEASE ORAL at 04:53

## 2021-05-15 RX ADMIN — CLONAZEPAM 0.5 MG: 0.5 TABLET ORAL at 08:27

## 2021-05-15 RX ADMIN — ATORVASTATIN CALCIUM 40 MG: 40 TABLET, FILM COATED ORAL at 17:20

## 2021-05-15 RX ADMIN — HYDROCORTISONE 10 MG: 10 TABLET ORAL at 05:00

## 2021-05-15 RX ADMIN — LEVOTHYROXINE SODIUM 112 MCG: 0.11 TABLET ORAL at 04:59

## 2021-05-15 RX ADMIN — FLUDROCORTISONE ACETATE 0.1 MG: 0.1 TABLET ORAL at 05:00

## 2021-05-15 RX ADMIN — ZIPRASIDONE HYDROCHLORIDE 60 MG: 60 CAPSULE ORAL at 20:16

## 2021-05-15 RX ADMIN — CLONAZEPAM 1 MG: 1 TABLET ORAL at 17:20

## 2021-05-15 RX ADMIN — PAROXETINE HYDROCHLORIDE 20 MG: 20 TABLET, FILM COATED ORAL at 08:27

## 2021-05-15 RX ADMIN — APIXABAN 5 MG: 5 TABLET, FILM COATED ORAL at 20:16

## 2021-05-15 RX ADMIN — DOCUSATE SODIUM 50 MG AND SENNOSIDES 8.6 MG 2 TABLET: 8.6; 5 TABLET, FILM COATED ORAL at 04:53

## 2021-05-15 RX ADMIN — METOPROLOL SUCCINATE 12.5 MG: 25 TABLET, EXTENDED RELEASE ORAL at 05:00

## 2021-05-15 RX ADMIN — APIXABAN 5 MG: 5 TABLET, FILM COATED ORAL at 08:27

## 2021-05-15 RX ADMIN — DIGOXIN 125 MCG: 125 TABLET ORAL at 17:20

## 2021-05-15 RX ADMIN — HYDROCORTISONE 5 MG: 10 TABLET ORAL at 17:20

## 2021-05-15 ASSESSMENT — ENCOUNTER SYMPTOMS
DEPRESSION: 0
WEAKNESS: 0
COUGH: 0
DIARRHEA: 0
NERVOUS/ANXIOUS: 0
VOMITING: 0
ABDOMINAL PAIN: 0
ROS GI COMMENTS: COLOSTOMY IN PLACE
NAUSEA: 0
FEVER: 0
SHORTNESS OF BREATH: 0
CONSTIPATION: 0

## 2021-05-15 ASSESSMENT — PAIN DESCRIPTION - PAIN TYPE: TYPE: ACUTE PAIN

## 2021-05-15 NOTE — CARE PLAN
Problem: Hemodynamics  Goal: Patient's hemodynamics, fluid balance and neurologic status will be stable or improve  Outcome: Progressing     Problem: Fluid Volume  Goal: Fluid volume balance will be maintained  Outcome: Progressing     Problem: Urinary - Renal Perfusion  Goal: Ability to achieve and maintain adequate renal perfusion and functioning will improve  Outcome: Progressing   The patient is Stable - Low risk of patient condition declining or worsening         Progress made toward(s) clinical / shift goals:     Patient is not progressing towards the following goals:

## 2021-05-15 NOTE — PROGRESS NOTES
Hospital Medicine Twice Weekly Progress Note    Date of Service  5/15/2021      Chief Complaint  Suicidal ideation    Hospital Course  Mr. Castro is a 56 year-old man with a past medical history significant for atrial fibrillation, secondary hypercoagulable state, diabetes mellitus complicated by diabetic neuropathy, chronic systolic CHF, colostomy placement, orthostatic hypotension (maintained on florinef), and dyslipidemia who presented to the ED on 2/9/2021 after he became dizzy and had a syncopal episode at home.  In the ED he was noted to be hypotensive and bradycardic. Cardiology was consulted and recommended stopping all AV sanket blocking medications.  He remained orthostatic during his hospitalization so midodrine was started.  An echocardiogram was obtained and showed mild mitral regurgitation and an EF of 60% which was improved from his previous echocardiogram obtained on 11/2020 (previously 35%).  CTAs of the head and neck were done and were unremarkable outside of distal bilateral ICA tortuosity.  Also during his hospitalization he expressed desire to kill himself.  He has a colostomy and has become overwhelmed by caring for it.  He was subsequently placed on a legal hold and psychiatry was consulted.  He is now pending [facility] placement at the 6-month db for maturity of his colostomy.    Interval Problem Update  Patient was seen and examined at bedside.  No acute events overnight.  No active complaints on a.m. rounds.   Discussed with patient's primary surgeon, Dr. Desouza, he is currently out of town and will reevaluate the patient on 5/24 with repeat CMP in prealbumin to check patient nutrition status for colostomy reversal.  Stated that information to the patient this morning.  Psychiatry re-evaluated the patient on 5/15 and extended the legal hold which was restarted on 5/10. Tele sitter.   Pending placement to inpatient psych facility  Patient is doing well with colostomy care.      Consultants/Specialty  Cardiology  Psychiatry  General surgery    Code Status  Full Code    Disposition  Placement to inpatient psych facility    Review of Systems  Review of Systems   Constitutional: Negative for fever and malaise/fatigue.   Respiratory: Negative for cough and shortness of breath.    Cardiovascular: Negative for chest pain and leg swelling.   Gastrointestinal: Negative for abdominal pain, constipation, diarrhea, nausea and vomiting.        Colostomy in place    Neurological: Negative for weakness.   Psychiatric/Behavioral: Negative for depression and suicidal ideas. The patient is not nervous/anxious.    All other systems reviewed and are negative.     Physical Exam  Temp:  [36.1 °C (97 °F)-36.3 °C (97.4 °F)] 36.3 °C (97.4 °F)  Pulse:  [73-91] 81  Resp:  [17-19] 19  BP: (124-144)/(73-81) 144/74  SpO2:  [92 %-96 %] 94 %    Physical Exam  Vitals and nursing note reviewed.   Constitutional:       General: He is awake. He is not in acute distress.     Appearance: Normal appearance. He is well-developed. He is not ill-appearing.   HENT:      Head: Normocephalic and atraumatic.      Mouth/Throat:      Lips: Pink.      Mouth: Mucous membranes are moist.   Eyes:      General: Lids are normal.      Conjunctiva/sclera: Conjunctivae normal.      Pupils: Pupils are equal, round, and reactive to light.   Cardiovascular:      Rate and Rhythm: Normal rate and regular rhythm.      Pulses: Normal pulses.      Heart sounds: Normal heart sounds.   Pulmonary:      Effort: Pulmonary effort is normal. No respiratory distress.      Breath sounds: Normal breath sounds. No decreased breath sounds or wheezing.   Abdominal:      General: Bowel sounds are normal. There is no distension.      Palpations: Abdomen is soft.      Tenderness: There is no abdominal tenderness. There is no guarding.       Musculoskeletal:      Cervical back: Neck supple.      Right lower leg: No edema.      Left lower leg: No edema.   Skin:      General: Skin is warm and dry.      Coloration: Skin is pale.   Neurological:      General: No focal deficit present.      Mental Status: He is alert and oriented to person, place, and time.      Cranial Nerves: No cranial nerve deficit.   Psychiatric:         Attention and Perception: Attention and perception normal.         Mood and Affect: Affect normal. Mood is depressed.         Speech: Speech normal.         Behavior: Behavior normal. Behavior is cooperative.         Thought Content: Thought content does not include suicidal ideation. Thought content does not include suicidal plan.         Cognition and Memory: Memory normal.         Fluids    Intake/Output Summary (Last 24 hours) at 5/15/2021 0947  Last data filed at 5/15/2021 0412  Gross per 24 hour   Intake --   Output 500 ml   Net -500 ml     Laboratory          Imaging  [id number] VIEW   Final Result         No significant interval change.      DX-CHEST-LIMITED (1 VIEW)   Final Result         Diffuse interstitial prominence could relate to mild pulmonary edema or atypical infection      [id number] VIEW   Final Result      Increased colonic gas without definite bowel obstruction.      US-RENAL   Final Result      No evidence of hydronephrosis.      Lobulated kidneys bilaterally.      CT-ABDOMEN-PELVIS WITH   Final Result      1.  There is no evidence of small bowel obstruction.   2.  There is a left lower quadrant ostomy.   3.  There is fluid distention of the colon distal to the surgical material in the left mid descending colon extending all the way to the rectum. There is no pneumatosis or free air.   4.  There is cholelithiasis without biliary dilatation.   5.  There has been interval removal of the drainage catheter anterior to the spleen with minimal hypodense area in the anterior spleen again noted. There is no new fluid collection.      IR-US GUIDED PIV   Final Result    Ultrasound-guided PERIPHERAL IV INSERTION performed by    qualified  "nursing staff as above.      EC-ECHOCARDIOGRAM COMPLETE W/O CONT   Final Result      DX-LUMBAR SPINE-2 OR 3 VIEWS   Final Result      Moderate compression deformity of T11 is new compared to 2018.      Mild wedge deformity of T12 is unchanged.      Degenerative changes including facet arthropathy.      Mild retrolisthesis of L5 on S1 and L3 on L4.            Assessment/Plan  * Suicidal ideation- (present on admission)  Assessment & Plan  -Has history of MDD, recurrent with psychotic features. active SI w/ plan on admission  -Psychiatry following, recommended discontinuation of civil commitment - suspect possible malingering or secondary gain   - Evaluated bedside again and discussed with patient who states he \"wants to live\"   -Psych recommending sitter to ensure patient does not attempt actions that would extend his stay   5/5: Patient reports that he is telling staff that his suicidal ideations because he would like attention and not because he is actually having suicidal ideations.  5/10: placed on legal hold.  Patient is still on legal hold , pending placement to inpatient psych facility    Obstructive uropathy- (present on admission)  Assessment & Plan  -Trial off of flomax given his hypotension.  Discontinued 4/6/2021. No retention continue off Flomax    Paroxysmal atrial fibrillation (HCC)- (present on admission)  Assessment & Plan  -CHADSVASC 3 (HTN, CHF, DM).   -TTE Feb 2021: EF 60%.  -Continue metoprolol, digoxin, and eliquis.    Chronic venous insufficiency of lower extremity- (present on admission)  Assessment & Plan  -Continue compression stockings.    Chronic heart failure with preserved ejection fraction (HCC)- (present on admission)  Assessment & Plan  -Now stable.  -Pulmonary edema noted in mid-March, was placed on IV diuresis though that was held when he developed a gram-negative gina bacteremia and sepsis.  Currently doing well from a respiratory standpoint, no shortness of breath, lungs clear to " auscultation bilaterally, not requiring oxygen supplementation.  -Repeat echocardiogram showed improvement in his ejection fraction to 60%.    -Continue metoprolol succinate.  -Lisinopril held per cardiology recommendations due to hypotension.    Diabetes mellitus type 2, insulin dependent (HCC)- (present on admission)  Assessment & Plan  -A1c 5.2% on 2/14/2021.  -Continue diabetic diet.  -Continue lantus at current dose   -Hypoglycemia protocol in place if needed.      Major depressive disorder, recurrent, severe with psychotic features (HCC)- (present on admission)  Assessment & Plan  -Management as listed under 'anxiety and depression'.    Colostomy present (HCC)- (present on admission)  Assessment & Plan  -History of fall at home Nov 2020 on left side with perforated colon and splenic fluid collection/hematoma s/p segmental colectomy, colostomy, mobilization of the splenic flexure, wound vac, and I&D Nov 2020.  -Wound care consulted for ostomy care, but he was non-participatory. He is now engaging in ostomy care with his bedside RNs, encouraged to be observant but do it independently.  -Patient must be independently able to care for his ostomy before inpatient psychiatry will accept him.  5/14: Discussed with patient's primary surgeon, Dr. Desouza, he is currently out of town and will reevaluate the patient on 5/24 with repeat CMP in prealbumin to check patient nutrition status for colostomy reversal.  Stated that information to the patient this morning.    Anxiety and depression- (present on admission)  Assessment & Plan  -Legal hold, awaiting acceptance at psych facility ([facility] will take in May).  -Continue paxil, klonopin & geodon.     Hypothyroidism- (present on admission)  Assessment & Plan  -TFTs in Feb were WNL. Continue levothyroxine at current dose.     Adrenal insufficiency (HCC)- (present on admission)  Assessment & Plan  -Started PO hydrocortisone 4/7/2021. Continue florinef.   -Needs outpatient  endocrinology follow up.  -Monitor potassium while on florinef. 4.4 on 4/7/2021.  -Repeat orthostatics periodically if symptomatic         Debility- (present on admission)  Assessment & Plan  -Cleared by PT/OT.  -Out of bed for each meal and ambulate in the hallways at least twice per shift.    Orthostatic hypotension- (present on admission)  Assessment & Plan  -Multifactorial including adrenal insufficiency, medications (beta blocker, CCB, tamsulosin), diabetic autonomic dysfunction, and venous insufficiency.  -EF is noted to be 60%, significant improvement from prior (previously 35%).  -Per cardiology, no further cardiac work-up needed.  Recommends follow-up with them as outpatient.  -Fall precautions.  -Continue to educate on the importance of slow positional changes.   - Started PO hydrocortisone 4/7/2021 for AI. Continuing florinef.   -Decreased toprolol which allowed for discontinuation of midodrine  Improved     Mixed hyperlipidemia- (present on admission)  Assessment & Plan  Lab Results   Component Value Date/Time    CHOLSTRLTOT 146 02/14/2021 01:30 AM    LDL 82 02/14/2021 01:30 AM    HDL 32 (A) 02/14/2021 01:30 AM    TRIGLYCERIDE 160 (H) 02/14/2021 01:30 AM   -Continue atorvastatin.     Lower limb amputation, great toe (HCC)- (present on admission)  Assessment & Plan  -History of, 2018.  Site well-healed.    Class 2 severe obesity with serious comorbidity and body mass index (BMI) of 35.0 to 35.9 in adult (HCC)- (present on admission)  Assessment & Plan  Body mass index is 35.19 kg/m².  -Could benefit from outpatient weight loss counseling which can be arranged through his PCP after hospital discharge.       VTE prophylaxis: Eliquis

## 2021-05-15 NOTE — CARE PLAN
The patient is Stable - Low risk of patient condition declining or worsening         Progress made toward(s) clinical / shift goals:  Pt has been given call light privileges again. Pt will use the call light appropriately     Patient is not progressing towards the following goals:

## 2021-05-16 LAB — GLUCOSE BLD-MCNC: 120 MG/DL (ref 65–99)

## 2021-05-16 PROCEDURE — 700102 HCHG RX REV CODE 250 W/ 637 OVERRIDE(OP): Performed by: STUDENT IN AN ORGANIZED HEALTH CARE EDUCATION/TRAINING PROGRAM

## 2021-05-16 PROCEDURE — A9270 NON-COVERED ITEM OR SERVICE: HCPCS | Performed by: NURSE PRACTITIONER

## 2021-05-16 PROCEDURE — 770001 HCHG ROOM/CARE - MED/SURG/GYN PRIV*

## 2021-05-16 PROCEDURE — 90832 PSYTX W PT 30 MINUTES: CPT | Performed by: PSYCHOLOGIST

## 2021-05-16 PROCEDURE — 99231 SBSQ HOSP IP/OBS SF/LOW 25: CPT | Performed by: STUDENT IN AN ORGANIZED HEALTH CARE EDUCATION/TRAINING PROGRAM

## 2021-05-16 PROCEDURE — A9270 NON-COVERED ITEM OR SERVICE: HCPCS | Performed by: HOSPITALIST

## 2021-05-16 PROCEDURE — A9270 NON-COVERED ITEM OR SERVICE: HCPCS | Performed by: STUDENT IN AN ORGANIZED HEALTH CARE EDUCATION/TRAINING PROGRAM

## 2021-05-16 PROCEDURE — 700102 HCHG RX REV CODE 250 W/ 637 OVERRIDE(OP): Performed by: NURSE PRACTITIONER

## 2021-05-16 PROCEDURE — 82962 GLUCOSE BLOOD TEST: CPT

## 2021-05-16 PROCEDURE — 700102 HCHG RX REV CODE 250 W/ 637 OVERRIDE(OP): Performed by: HOSPITALIST

## 2021-05-16 RX ADMIN — ZIPRASIDONE HYDROCHLORIDE 60 MG: 60 CAPSULE ORAL at 07:55

## 2021-05-16 RX ADMIN — HYDROCORTISONE 5 MG: 10 TABLET ORAL at 15:15

## 2021-05-16 RX ADMIN — HYDROCORTISONE 10 MG: 10 TABLET ORAL at 04:12

## 2021-05-16 RX ADMIN — ATORVASTATIN CALCIUM 40 MG: 40 TABLET, FILM COATED ORAL at 17:38

## 2021-05-16 RX ADMIN — APIXABAN 5 MG: 5 TABLET, FILM COATED ORAL at 07:56

## 2021-05-16 RX ADMIN — METOPROLOL SUCCINATE 12.5 MG: 25 TABLET, EXTENDED RELEASE ORAL at 04:12

## 2021-05-16 RX ADMIN — FLUDROCORTISONE ACETATE 0.1 MG: 0.1 TABLET ORAL at 04:12

## 2021-05-16 RX ADMIN — POTASSIUM CHLORIDE 20 MEQ: 20 TABLET, EXTENDED RELEASE ORAL at 04:12

## 2021-05-16 RX ADMIN — APIXABAN 5 MG: 5 TABLET, FILM COATED ORAL at 19:46

## 2021-05-16 RX ADMIN — PAROXETINE HYDROCHLORIDE 20 MG: 20 TABLET, FILM COATED ORAL at 07:56

## 2021-05-16 RX ADMIN — CLONAZEPAM 0.5 MG: 0.5 TABLET ORAL at 07:56

## 2021-05-16 RX ADMIN — CLONAZEPAM 1 MG: 1 TABLET ORAL at 17:38

## 2021-05-16 RX ADMIN — LEVOTHYROXINE SODIUM 112 MCG: 0.11 TABLET ORAL at 04:12

## 2021-05-16 RX ADMIN — ZIPRASIDONE HYDROCHLORIDE 60 MG: 60 CAPSULE ORAL at 19:46

## 2021-05-16 RX ADMIN — DOCUSATE SODIUM 50 MG AND SENNOSIDES 8.6 MG 2 TABLET: 8.6; 5 TABLET, FILM COATED ORAL at 04:12

## 2021-05-16 RX ADMIN — DIGOXIN 125 MCG: 125 TABLET ORAL at 17:39

## 2021-05-16 ASSESSMENT — ENCOUNTER SYMPTOMS
FEVER: 0
ROS GI COMMENTS: COLOSTOMY IN PLACE
NAUSEA: 0
VOMITING: 0
ABDOMINAL PAIN: 0
DEPRESSION: 0
DIARRHEA: 0
CONSTIPATION: 0
COUGH: 0
SHORTNESS OF BREATH: 0
NERVOUS/ANXIOUS: 0
WEAKNESS: 0

## 2021-05-16 ASSESSMENT — PAIN DESCRIPTION - PAIN TYPE: TYPE: ACUTE PAIN

## 2021-05-16 NOTE — CARE PLAN
The patient is Stable - Low risk of patient condition declining or worsening         Progress made toward(s) clinical / shift goals:  Pt smiles and laughed at jokes.     Patient is not progressing towards the following goals:

## 2021-05-16 NOTE — PROGRESS NOTES
Hospital Medicine Twice Weekly Progress Note    Date of Service  5/16/2021      Chief Complaint  Suicidal ideation    Hospital Course  Mr. Castro is a 56 year-old man with a past medical history significant for atrial fibrillation, secondary hypercoagulable state, diabetes mellitus complicated by diabetic neuropathy, chronic systolic CHF, colostomy placement, orthostatic hypotension (maintained on florinef), and dyslipidemia who presented to the ED on 2/9/2021 after he became dizzy and had a syncopal episode at home.  In the ED he was noted to be hypotensive and bradycardic. Cardiology was consulted and recommended stopping all AV sanket blocking medications.  He remained orthostatic during his hospitalization so midodrine was started.  An echocardiogram was obtained and showed mild mitral regurgitation and an EF of 60% which was improved from his previous echocardiogram obtained on 11/2020 (previously 35%).  CTAs of the head and neck were done and were unremarkable outside of distal bilateral ICA tortuosity.  Also during his hospitalization he expressed desire to kill himself.  He has a colostomy and has become overwhelmed by caring for it.  He was subsequently placed on a legal hold and psychiatry was consulted.  He is now pending [facility] placement at the 6-month db for maturity of his colostomy.    Interval Problem Update  Patient was seen and examined at bedside.  No acute events overnight.  No active complaints on a.m. rounds.   Discussed with patient's primary surgeon, Dr. Desouza, he is currently out of town and will reevaluate the patient on 5/24 with repeat CMP & prealbumin to check patient's nutrition status for colostomy reversal.   Psychiatry re-evaluated the patient on 5/15 and extended the legal hold which was restarted on 5/10. Tele sitter.   Pending placement to inpatient psych facility  Patient is doing well with colostomy care.     Consultants/Specialty  Cardiology  Psychiatry  General  surgery    Code Status  Full Code    Disposition  Placement to inpatient psych facility    Review of Systems  Review of Systems   Constitutional: Negative for fever and malaise/fatigue.   Respiratory: Negative for cough and shortness of breath.    Cardiovascular: Negative for chest pain and leg swelling.   Gastrointestinal: Negative for abdominal pain, constipation, diarrhea, nausea and vomiting.        Colostomy in place    Neurological: Negative for weakness.   Psychiatric/Behavioral: Negative for depression and suicidal ideas. The patient is not nervous/anxious.    All other systems reviewed and are negative.     Physical Exam  Temp:  [36 °C (96.8 °F)-36.2 °C (97.1 °F)] 36 °C (96.8 °F)  Pulse:  [] 73  Resp:  [18-20] 20  BP: (111-145)/(59-90) 111/59  SpO2:  [95 %-100 %] 100 %    Physical Exam  Vitals and nursing note reviewed.   Constitutional:       General: He is awake. He is not in acute distress.     Appearance: Normal appearance. He is well-developed. He is not ill-appearing.   HENT:      Head: Normocephalic and atraumatic.      Mouth/Throat:      Lips: Pink.      Mouth: Mucous membranes are moist.   Eyes:      General: Lids are normal.      Conjunctiva/sclera: Conjunctivae normal.      Pupils: Pupils are equal, round, and reactive to light.   Cardiovascular:      Rate and Rhythm: Normal rate and regular rhythm.      Pulses: Normal pulses.      Heart sounds: Normal heart sounds.   Pulmonary:      Effort: Pulmonary effort is normal. No respiratory distress.      Breath sounds: Normal breath sounds. No decreased breath sounds or wheezing.   Abdominal:      General: Bowel sounds are normal. There is no distension.      Palpations: Abdomen is soft.      Tenderness: There is no abdominal tenderness. There is no guarding.       Musculoskeletal:      Cervical back: Neck supple.      Right lower leg: No edema.      Left lower leg: No edema.   Skin:     General: Skin is warm and dry.      Coloration: Skin is  pale.   Neurological:      General: No focal deficit present.      Mental Status: He is alert and oriented to person, place, and time.      Cranial Nerves: No cranial nerve deficit.   Psychiatric:         Attention and Perception: Attention and perception normal.         Mood and Affect: Affect normal. Mood is depressed.         Speech: Speech normal.         Behavior: Behavior normal. Behavior is cooperative.         Thought Content: Thought content does not include suicidal ideation. Thought content does not include suicidal plan.         Cognition and Memory: Memory normal.         Fluids    Intake/Output Summary (Last 24 hours) at 5/16/2021 1003  Last data filed at 5/16/2021 0758  Gross per 24 hour   Intake 1240 ml   Output 500 ml   Net 740 ml     Laboratory          Imaging  [id number] VIEW   Final Result         No significant interval change.      DX-CHEST-LIMITED (1 VIEW)   Final Result         Diffuse interstitial prominence could relate to mild pulmonary edema or atypical infection      [id number] VIEW   Final Result      Increased colonic gas without definite bowel obstruction.      US-RENAL   Final Result      No evidence of hydronephrosis.      Lobulated kidneys bilaterally.      CT-ABDOMEN-PELVIS WITH   Final Result      1.  There is no evidence of small bowel obstruction.   2.  There is a left lower quadrant ostomy.   3.  There is fluid distention of the colon distal to the surgical material in the left mid descending colon extending all the way to the rectum. There is no pneumatosis or free air.   4.  There is cholelithiasis without biliary dilatation.   5.  There has been interval removal of the drainage catheter anterior to the spleen with minimal hypodense area in the anterior spleen again noted. There is no new fluid collection.      IR-US GUIDED PIV   Final Result    Ultrasound-guided PERIPHERAL IV INSERTION performed by    qualified nursing staff as above.      EC-ECHOCARDIOGRAM COMPLETE  "W/O CONT   Final Result      DX-LUMBAR SPINE-2 OR 3 VIEWS   Final Result      Moderate compression deformity of T11 is new compared to 2018.      Mild wedge deformity of T12 is unchanged.      Degenerative changes including facet arthropathy.      Mild retrolisthesis of L5 on S1 and L3 on L4.            Assessment/Plan  * Suicidal ideation- (present on admission)  Assessment & Plan  -Has history of MDD, recurrent with psychotic features. active SI w/ plan on admission  -Psychiatry following, recommended discontinuation of civil commitment - suspect possible malingering or secondary gain   - Evaluated bedside again and discussed with patient who states he \"wants to live\"   -Psych recommending sitter to ensure patient does not attempt actions that would extend his stay   5/5: Patient reports that he is telling staff that his suicidal ideations because he would like attention and not because he is actually having suicidal ideations.  5/10: placed on legal hold.  Patient is still on legal hold , pending placement to inpatient psych facility    Obstructive uropathy- (present on admission)  Assessment & Plan  -Trial off of flomax given his hypotension.  Discontinued 4/6/2021. No retention continue off Flomax    Paroxysmal atrial fibrillation (HCC)- (present on admission)  Assessment & Plan  -CHADSVASC 3 (HTN, CHF, DM).   -TTE Feb 2021: EF 60%.  -Continue metoprolol, digoxin, and eliquis.    Chronic venous insufficiency of lower extremity- (present on admission)  Assessment & Plan  -Continue compression stockings.    Chronic heart failure with preserved ejection fraction (HCC)- (present on admission)  Assessment & Plan  -Now stable.  -Pulmonary edema noted in mid-March, was placed on IV diuresis though that was held when he developed a gram-negative gina bacteremia and sepsis.  Currently doing well from a respiratory standpoint, no shortness of breath, lungs clear to auscultation bilaterally, not requiring oxygen " supplementation.  -Repeat echocardiogram showed improvement in his ejection fraction to 60%.    -Continue metoprolol succinate.  -Lisinopril held per cardiology recommendations due to hypotension.    Diabetes mellitus type 2, insulin dependent (HCC)- (present on admission)  Assessment & Plan  -A1c 5.2% on 2/14/2021.  -Continue diabetic diet.  -Continue lantus at current dose   -Hypoglycemia protocol in place if needed.      Major depressive disorder, recurrent, severe with psychotic features (HCC)- (present on admission)  Assessment & Plan  -Management as listed under 'anxiety and depression'.    Colostomy present (HCC)- (present on admission)  Assessment & Plan  -History of fall at home Nov 2020 on left side with perforated colon and splenic fluid collection/hematoma s/p segmental colectomy, colostomy, mobilization of the splenic flexure, wound vac, and I&D Nov 2020.  -Wound care consulted for ostomy care, but he was non-participatory. He is now engaging in ostomy care with his bedside RNs, encouraged to be observant but do it independently.  -Patient must be independently able to care for his ostomy before inpatient psychiatry will accept him.  5/14: Discussed with patient's primary surgeon, Dr. Desouza, he is currently out of town and will reevaluate the patient on 5/24 with repeat CMP in prealbumin to check patient nutrition status for colostomy reversal.  Stated that information to the patient this morning.    Anxiety and depression- (present on admission)  Assessment & Plan  -Legal hold, awaiting acceptance at psych facility ([facility] will take in May).  -Continue paxil, klonopin & geodon.     Hypothyroidism- (present on admission)  Assessment & Plan  -TFTs in Feb were WNL. Continue levothyroxine at current dose.     Adrenal insufficiency (HCC)- (present on admission)  Assessment & Plan  -Started PO hydrocortisone 4/7/2021. Continue florinef.   -Needs outpatient endocrinology follow up.  -Monitor potassium while  on florinef. 4.4 on 4/7/2021.  -Repeat orthostatics periodically if symptomatic         Debility- (present on admission)  Assessment & Plan  -Cleared by PT/OT.  -Out of bed for each meal and ambulate in the hallways at least twice per shift.    Orthostatic hypotension- (present on admission)  Assessment & Plan  -Multifactorial including adrenal insufficiency, medications (beta blocker, CCB, tamsulosin), diabetic autonomic dysfunction, and venous insufficiency.  -EF is noted to be 60%, significant improvement from prior (previously 35%).  -Per cardiology, no further cardiac work-up needed.  Recommends follow-up with them as outpatient.  -Fall precautions.  -Continue to educate on the importance of slow positional changes.   - Started PO hydrocortisone 4/7/2021 for AI. Continuing florinef.   -Decreased toprolol which allowed for discontinuation of midodrine  Improved     Mixed hyperlipidemia- (present on admission)  Assessment & Plan  Lab Results   Component Value Date/Time    CHOLSTRLTOT 146 02/14/2021 01:30 AM    LDL 82 02/14/2021 01:30 AM    HDL 32 (A) 02/14/2021 01:30 AM    TRIGLYCERIDE 160 (H) 02/14/2021 01:30 AM   -Continue atorvastatin.     Lower limb amputation, great toe (HCC)- (present on admission)  Assessment & Plan  -History of, 2018.  Site well-healed.    Class 2 severe obesity with serious comorbidity and body mass index (BMI) of 35.0 to 35.9 in adult (HCC)- (present on admission)  Assessment & Plan  Body mass index is 35.19 kg/m².  -Could benefit from outpatient weight loss counseling which can be arranged through his PCP after hospital discharge.       VTE prophylaxis: Eliquis

## 2021-05-16 NOTE — CARE PLAN
The patient is Watcher - Medium risk of patient condition declining or worsening    Shift Goals  Clinical Goals: safety     Progress made toward(s) clinical / shift goals:  progressing, q 15 mins safety safety checks  Problem: Depression  Goal: Patient and family/caregiver will verbalize accurate information about at least two of the possible causes of depression, three-four of the signs and symptoms of depression  Outcome: Progressing     Problem: Provide Safe Environment  Goal: Suicide environmental safety, protocols, policies, and practices will be implemented  Outcome: Progressing     Problem: Respiratory  Goal: Patient will achieve/maintain optimum respiratory ventilation and gas exchange  Outcome: Progressing       Patient is not progressing towards the following goals:

## 2021-05-16 NOTE — CONSULTS
"PSYCHOLOGICAL FOLLOW-UP:  Reason for admission: Syncope and collapse [R55]  Suicidal ideation [R45.851]  Sepsis due to Klebsiella pneumoniae (HCC) [A41.4]  Length of Visit: 30min    Legal status: Legal Status: Involuntary    Chief Complaint: \"the thoughts came back yesterday and today\"    HPI: Met with the patient to conduct a risk assessment and provide crisis intervention. Patient presented with a bright affect but reported a \"not good\" mood. He was smiling even as he spoke about his suicidal thoughts returning yesterday and today. However, he did report feeling proud of himself for not going through with his thoughts. He even pointed out that he put his call light out of reach to avoid hurting himself with it. He endorsed suicidality with uncertain intent. Discussed again the importance of recognizing thoughts as just thoughts and the choice he has to not follow through with those thoughts. He continues to demonstrate poor carryover from session to session.     Psychiatric Examination:  Vitals: Blood pressure 111/59, pulse 73, temperature 36 °C (96.8 °F), temperature source Temporal, resp. rate 20, height 1.829 m (6'), weight 116 kg (256 lb 13.4 oz), SpO2 100 %.  Musculoskeletal: normal psychomotor activity, no tics or unusual mannerisms noted  Appearance and Eye Contact: appropriate dress and grooming. Behavior is calm, cooperative,  appropriate eye contact  Attention/Alertness: Alert  Thought Process: Linear and Logical    Thought Content: SI  Speech: Clear with normal rate and rhythm  Mood: \"not good\"            Affect: smiling          SI/HI: Endorses    Memory: Recent and remote memory appear possibly impaired   Orientation: alert, oriented to person, place and time  Insight into symptoms: very poor  Judgement into symptoms:very poor    ASSESSMENT: While the patient continues to engage in behavior that suggests his suicidality is not as severe as he reports, he continues to lack insight into his psychiatric " health and has a recent history of engaging in suicidal gestures while in the acute care setting. He does not demonstrate carryover of things learned in psychotherapy and is again reporting uncertain intent. He does not have a plan for how he will keep himself from attempting suicide or hurting himself when triggered again. He continues to present a risk of attempting suicide and/or engaging in self-harming behaviors due to his personality disorder. Legal hold remains extended.     If the patient continues to demonstrate future oriented behaviors this week, can consider lifting the legal hold again.     DSM5 Diagnostic Considerations:   Unspecified Personality Disorder  Major Depressive Disorder, recurrent, severe, with psychotic features    PLAN:  Legal status: Involuntary  Anticipate F/U by a qualified mental health professional within 72 hours    Tele-sitter with q15 checks. Upgrade to 1:1 sitter if he engages in any self-harming or suicidal behavior or gesture.      Limit conversations with the patient to only as needed interactions as his suicidality can worsen with perceived negative interactions with staff.      Patient is allowed to have access to:     [employer]     TV with his call light. Please immediately remove if he appears to be engaging in any self-harm/suicidal behavior.      Legal hold restrictions as follows:     -Strip room of anything he can hurt himself with including all cords (except call light.) Do not leave anything in there that he could use to stick inside his body.     -Finger foods only     -Supervise ostomy bag changes and remove all supplies once he is done     -Place gloves on the patient or consider restraints if he attempts to pull the ostomy out and/or hurts himself in anyway      -Do not allow the patient to walk around the unit as he could grab something to hurt himself with while on that walk.      -please continue to refer patient to inpatient psychiatric hospitals     Records  reviewed: yes  Discussed patient with other provider: yes, team psychiatrist   Will continue to follow  Thank you for the consult.    Madhuri Fernandez, Ph.D.

## 2021-05-17 ENCOUNTER — APPOINTMENT (OUTPATIENT)
Dept: CARDIOLOGY | Facility: MEDICAL CENTER | Age: 56
End: 2021-05-17
Payer: MEDICARE

## 2021-05-17 PROCEDURE — A9270 NON-COVERED ITEM OR SERVICE: HCPCS | Performed by: STUDENT IN AN ORGANIZED HEALTH CARE EDUCATION/TRAINING PROGRAM

## 2021-05-17 PROCEDURE — 700102 HCHG RX REV CODE 250 W/ 637 OVERRIDE(OP): Performed by: HOSPITALIST

## 2021-05-17 PROCEDURE — 700102 HCHG RX REV CODE 250 W/ 637 OVERRIDE(OP): Performed by: NURSE PRACTITIONER

## 2021-05-17 PROCEDURE — 700102 HCHG RX REV CODE 250 W/ 637 OVERRIDE(OP): Performed by: STUDENT IN AN ORGANIZED HEALTH CARE EDUCATION/TRAINING PROGRAM

## 2021-05-17 PROCEDURE — A9270 NON-COVERED ITEM OR SERVICE: HCPCS | Performed by: HOSPITALIST

## 2021-05-17 PROCEDURE — 770001 HCHG ROOM/CARE - MED/SURG/GYN PRIV*

## 2021-05-17 PROCEDURE — A9270 NON-COVERED ITEM OR SERVICE: HCPCS | Performed by: NURSE PRACTITIONER

## 2021-05-17 PROCEDURE — 99231 SBSQ HOSP IP/OBS SF/LOW 25: CPT | Performed by: STUDENT IN AN ORGANIZED HEALTH CARE EDUCATION/TRAINING PROGRAM

## 2021-05-17 RX ADMIN — METOPROLOL SUCCINATE 12.5 MG: 25 TABLET, EXTENDED RELEASE ORAL at 05:11

## 2021-05-17 RX ADMIN — HYDROCORTISONE 10 MG: 10 TABLET ORAL at 05:11

## 2021-05-17 RX ADMIN — ZIPRASIDONE HYDROCHLORIDE 60 MG: 60 CAPSULE ORAL at 21:57

## 2021-05-17 RX ADMIN — FLUDROCORTISONE ACETATE 0.1 MG: 0.1 TABLET ORAL at 05:11

## 2021-05-17 RX ADMIN — DIGOXIN 125 MCG: 125 TABLET ORAL at 17:21

## 2021-05-17 RX ADMIN — LEVOTHYROXINE SODIUM 112 MCG: 0.11 TABLET ORAL at 05:11

## 2021-05-17 RX ADMIN — POTASSIUM CHLORIDE 20 MEQ: 20 TABLET, EXTENDED RELEASE ORAL at 05:11

## 2021-05-17 RX ADMIN — CLONAZEPAM 0.5 MG: 0.5 TABLET ORAL at 09:25

## 2021-05-17 RX ADMIN — PAROXETINE HYDROCHLORIDE 20 MG: 20 TABLET, FILM COATED ORAL at 09:24

## 2021-05-17 RX ADMIN — CLONAZEPAM 1 MG: 1 TABLET ORAL at 17:21

## 2021-05-17 RX ADMIN — APIXABAN 5 MG: 5 TABLET, FILM COATED ORAL at 21:57

## 2021-05-17 RX ADMIN — HYDROCORTISONE 5 MG: 10 TABLET ORAL at 16:10

## 2021-05-17 RX ADMIN — ATORVASTATIN CALCIUM 40 MG: 40 TABLET, FILM COATED ORAL at 17:21

## 2021-05-17 RX ADMIN — APIXABAN 5 MG: 5 TABLET, FILM COATED ORAL at 09:24

## 2021-05-17 RX ADMIN — ZIPRASIDONE HYDROCHLORIDE 60 MG: 60 CAPSULE ORAL at 09:25

## 2021-05-17 ASSESSMENT — ENCOUNTER SYMPTOMS
NERVOUS/ANXIOUS: 0
VOMITING: 0
WEAKNESS: 0
NAUSEA: 0
DEPRESSION: 0
COUGH: 0
SHORTNESS OF BREATH: 0
CONSTIPATION: 0
FEVER: 0
ABDOMINAL PAIN: 0
ROS GI COMMENTS: COLOSTOMY IN PLACE
DIARRHEA: 0

## 2021-05-17 ASSESSMENT — PAIN DESCRIPTION - PAIN TYPE: TYPE: ACUTE PAIN

## 2021-05-17 NOTE — PROGRESS NOTES
Fall/Event Note Hospital Medicine Daily Progress Note    Date of Service  2/14/2019    Chief Complaint  53 y.o. male admitted 12/28/2018 with Syncope    Hospital Course  Admitted with Syncope, noted to have L1 compression fracture, work up negative except for orthostatic hypotension.  He then started complaining of suicidal thoughts.    Interval Problem Update  Reports left groin rash, afebrile  -Pt is no longer requiring larry lift, he is ambulatory with SBA.      Consultants/Specialty  Psychiatry  Neurosurgery signed off    Code Status  Full    Disposition  Legal hold  1:1 sitter  Awaiting inpatient psychiatry placement    Review of Systems  Review of Systems   Constitutional: Negative for chills, diaphoresis, fever and malaise/fatigue.   Respiratory: Negative for cough.    Cardiovascular: Positive for leg swelling. Negative for chest pain.   Gastrointestinal: Negative for abdominal pain, heartburn and nausea.   Genitourinary: Negative for dysuria.   Musculoskeletal: Positive for back pain and myalgias.   Skin: Positive for itching and rash.   Neurological: Positive for weakness. Negative for dizziness, focal weakness and headaches.   Psychiatric/Behavioral: Positive for suicidal ideas. Negative for depression.        Physical Exam  Temp:  [36.3 °C (97.4 °F)-36.7 °C (98.1 °F)] 36.6 °C (97.8 °F)  Pulse:  [58-69] 65  Resp:  [16] 16  BP: (117-171)/(61-87) 171/87  SpO2:  [93 %-97 %] 95 %    Physical Exam   Constitutional: He is oriented to person, place, and time. No distress.   HENT:   Head: Normocephalic and atraumatic.   Mouth/Throat: No oropharyngeal exudate.   Eyes: Pupils are equal, round, and reactive to light. EOM are normal. Right eye exhibits no discharge. Left eye exhibits no discharge.   Cardiovascular: Normal rate and regular rhythm.    Pulmonary/Chest: Effort normal and breath sounds normal. No respiratory distress. He has no rales.   Abdominal: Soft. Bowel sounds are normal. He exhibits no distension.    Musculoskeletal: He exhibits edema.   Right 1st toe amputation   Neurological: He is alert and oriented to person, place, and time. No cranial nerve deficit. He exhibits normal muscle tone. Coordination normal.   MMT 5/5   Skin: Skin is warm and dry. Rash noted. There is erythema.   Venous stasis both legs   Psychiatric: His behavior is normal. Judgment normal.   Nursing note and vitals reviewed.      Fluids  No intake or output data in the 24 hours ending 02/14/19 1151    Laboratory                        Imaging  DX-HIP-UNILATERAL-WITH PELVIS-1 VIEW RIGHT   Final Result      No radiographic evidence of acute traumatic injury right hip.      DX-CHEST-PORTABLE (1 VIEW)   Final Result      Hypoinflation without other evidence for acute cardiopulmonary disease.      MR-BRAIN-W/O   Final Result      1.  MRI of the brain without contrast within normal limits for age with mild white matter changes.   2.  Chronic appearing sinus disease   3.  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 This does not have an aggressive appearance.      US-EXTREMITY VENOUS LOWER BILAT   Final Result      CT-HAND WITH RIGHT   Final Result      1.  Edema in the dorsum of the forearm and hand without a discrete fluid collection to suggest abscess is identified.      US-CAROTID DOPPLER BILAT   Final Result      DX-LUMBAR SPINE-2 OR 3 VIEWS   Final Result         Stable compression deformity in the L1 vertebral body.      CT-CTA CHEST PULMONARY ARTERY W/ RECONS   Final Result         1. No CT evidence of pulmonary embolism.      2. Old right rib fractures.      3. Cholelithiasis. Enlarged spleen with granulomas.      4. Mild superior endplate compression deformity of L1 could be acute. Correlate with point tenderness. No retropulsion. No malalignment.      CT-CSPINE WITHOUT PLUS RECONS   Final Result         1. No acute fracture from C1 through T3 is visualized.         CT-HEAD W/O   Final Result          1. No acute intracranial abnormality. No evidence of acute intracranial hemorrhage or mass lesion.               DX-THORACIC SPINE-WITH SWIMMERS VIEW   Final Result      No acute thoracic spine fracture or subluxation identified.      DX-CHEST-PORTABLE (1 VIEW)   Final Result      No acute cardiac or pulmonary abnormality is noted.           Assessment/Plan  * Orthostatic hypotension- (present on admission)   Assessment & Plan    Decreased Florinef to 0.2 mg  Midodrine adjusted parameters (Hold for SBP > 150)     Suicidal ideation   Assessment & Plan    Felix Sanchezdal  1:1 sitter, psychiatry to reassess needs     Fall from ground level- (present on admission)   Assessment & Plan    PT/OT     Syncope- (present on admission)   Assessment & Plan    Autonomic dysfunction?       Debility- (present on admission)   Assessment & Plan    PT/OT     Acute respiratory failure with hypoxia (HCC)- (present on admission)   Assessment & Plan    Resolved     Lumbar compression fracture (HCC)- (present on admission)   Assessment & Plan    TLSO brace   pain control  Limit narcotics  Add NSAID as needed, monitor closely    Ambulating with CGA     Psychogenic nonepileptic seizure- (present on admission)   Assessment & Plan    no treatment indicated.     Chronic diastolic heart failure (HCC)- (present on admission)   Assessment & Plan    Off Lisinopril and Lasix     Venous stasis dermatitis of both lower extremities- (present on admission)   Assessment & Plan    Compression     Bilateral leg edema- (present on admission)   Assessment & Plan    compression     Vitamin B12 deficiency- (present on admission)   Assessment & Plan    oral B12     Candida infection of genital region   Assessment & Plan    Initiate nystatin powder     Paroxysmal A-fib (HCC)- (present on admission)   Assessment & Plan    Amiodarone, ASA  [id number] - 3     Essential hypertension- (present on admission)   Assessment & Plan    Off Lisinopril     Mixed  hyperlipidemia- (present on admission)   Assessment & Plan    Pravastatin       Type 2 diabetes mellitus, without long-term current use of insulin, with peripheral neuropathy (HCC)- (present on admission)   Assessment & Plan    Diet controlled     Hyponatremia- (present on admission)   Assessment & Plan    Salt tabs, follow bmp          VTE prophylaxis: Lovenox

## 2021-05-17 NOTE — DISCHARGE PLANNING
@2098  Agency/Facility Name: [facility]  Spoke To: Jorgito  Outcome: Their MD is not comfortable with patient's colostomy care so sent today's note showing patient is doing fine with care.    @5800  Agency/Facility Name: [facility]  Outcome: Left message, awaiting call back.

## 2021-05-17 NOTE — PROGRESS NOTES
Hospital Medicine Twice Weekly Progress Note    Date of Service  5/17/2021      Chief Complaint  Suicidal ideation    Hospital Course  Mr. Castro is a 56 year-old man with a past medical history significant for atrial fibrillation, secondary hypercoagulable state, diabetes mellitus complicated by diabetic neuropathy, chronic systolic CHF, colostomy placement, orthostatic hypotension (maintained on florinef), and dyslipidemia who presented to the ED on 2/9/2021 after he became dizzy and had a syncopal episode at home.  In the ED he was noted to be hypotensive and bradycardic. Cardiology was consulted and recommended stopping all AV sanket blocking medications.  He remained orthostatic during his hospitalization so midodrine was started.  An echocardiogram was obtained and showed mild mitral regurgitation and an EF of 60% which was improved from his previous echocardiogram obtained on 11/2020 (previously 35%).  CTAs of the head and neck were done and were unremarkable outside of distal bilateral ICA tortuosity.  Also during his hospitalization he expressed desire to kill himself.  He has a colostomy and has become overwhelmed by caring for it.  He was subsequently placed on a legal hold and psychiatry was consulted.  He is now pending [facility] placement at the 6-month db for maturity of his colostomy.    Interval Problem Update  Patient was seen and examined at bedside.  No acute events overnight.  No active complaints on a.m. rounds.   Discussed with patient's primary surgeon, Dr. Desouza, he is currently out of town and will reevaluate the patient on 5/24 with repeat CMP & prealbumin to check patient's nutrition status for colostomy reversal.   Psychiatry re-evaluated the patient on 5/15 and extended the legal hold which was restarted on 5/10. Tele sitter.   Pending placement to inpatient psych facility  Patient is doing well with colostomy care.     Consultants/Specialty  Cardiology  Psychiatry  General  surgery    Code Status  Full Code    Disposition  Placement to inpatient psych facility    Review of Systems  Review of Systems   Constitutional: Negative for fever and malaise/fatigue.   Respiratory: Negative for cough and shortness of breath.    Cardiovascular: Negative for chest pain and leg swelling.   Gastrointestinal: Negative for abdominal pain, constipation, diarrhea, nausea and vomiting.        Colostomy in place    Neurological: Negative for weakness.   Psychiatric/Behavioral: Negative for depression and suicidal ideas. The patient is not nervous/anxious.    All other systems reviewed and are negative.     Physical Exam  Temp:  [35.9 °C (96.6 °F)-37 °C (98.6 °F)] 35.9 °C (96.6 °F)  Pulse:  [61-89] 61  Resp:  [16-18] 16  BP: (119-147)/(68-86) 119/70  SpO2:  [92 %-95 %] 95 %    Physical Exam  Vitals and nursing note reviewed.   Constitutional:       General: He is awake. He is not in acute distress.     Appearance: Normal appearance. He is well-developed. He is not ill-appearing.   HENT:      Head: Normocephalic and atraumatic.      Mouth/Throat:      Lips: Pink.      Mouth: Mucous membranes are moist.   Eyes:      General: Lids are normal.      Conjunctiva/sclera: Conjunctivae normal.      Pupils: Pupils are equal, round, and reactive to light.   Cardiovascular:      Rate and Rhythm: Normal rate and regular rhythm.      Pulses: Normal pulses.      Heart sounds: Normal heart sounds.   Pulmonary:      Effort: Pulmonary effort is normal. No respiratory distress.      Breath sounds: Normal breath sounds. No decreased breath sounds or wheezing.   Abdominal:      General: Bowel sounds are normal. There is no distension.      Palpations: Abdomen is soft.      Tenderness: There is no abdominal tenderness. There is no guarding.       Musculoskeletal:      Cervical back: Neck supple.      Right lower leg: No edema.      Left lower leg: No edema.   Skin:     General: Skin is warm and dry.      Coloration: Skin is  pale.   Neurological:      General: No focal deficit present.      Mental Status: He is alert and oriented to person, place, and time.      Cranial Nerves: No cranial nerve deficit.   Psychiatric:         Attention and Perception: Attention and perception normal.         Mood and Affect: Affect normal. Mood is depressed.         Speech: Speech normal.         Behavior: Behavior normal. Behavior is cooperative.         Thought Content: Thought content does not include suicidal ideation. Thought content does not include suicidal plan.         Cognition and Memory: Memory normal.         Fluids    Intake/Output Summary (Last 24 hours) at 5/17/2021 0948  Last data filed at 5/16/2021 2100  Gross per 24 hour   Intake --   Output 1350 ml   Net -1350 ml     Laboratory          Imaging  [id number] VIEW   Final Result         No significant interval change.      DX-CHEST-LIMITED (1 VIEW)   Final Result         Diffuse interstitial prominence could relate to mild pulmonary edema or atypical infection      [id number] VIEW   Final Result      Increased colonic gas without definite bowel obstruction.      US-RENAL   Final Result      No evidence of hydronephrosis.      Lobulated kidneys bilaterally.      CT-ABDOMEN-PELVIS WITH   Final Result      1.  There is no evidence of small bowel obstruction.   2.  There is a left lower quadrant ostomy.   3.  There is fluid distention of the colon distal to the surgical material in the left mid descending colon extending all the way to the rectum. There is no pneumatosis or free air.   4.  There is cholelithiasis without biliary dilatation.   5.  There has been interval removal of the drainage catheter anterior to the spleen with minimal hypodense area in the anterior spleen again noted. There is no new fluid collection.      IR-US GUIDED PIV   Final Result    Ultrasound-guided PERIPHERAL IV INSERTION performed by    qualified nursing staff as above.      EC-ECHOCARDIOGRAM COMPLETE W/O  "CONT   Final Result      DX-LUMBAR SPINE-2 OR 3 VIEWS   Final Result      Moderate compression deformity of T11 is new compared to 2018.      Mild wedge deformity of T12 is unchanged.      Degenerative changes including facet arthropathy.      Mild retrolisthesis of L5 on S1 and L3 on L4.            Assessment/Plan  * Suicidal ideation- (present on admission)  Assessment & Plan  -Has history of MDD, recurrent with psychotic features. active SI w/ plan on admission  -Psychiatry following, recommended discontinuation of civil commitment - suspect possible malingering or secondary gain   - Evaluated bedside again and discussed with patient who states he \"wants to live\"   -Psych recommending sitter to ensure patient does not attempt actions that would extend his stay   5/5: Patient reports that he is telling staff that his suicidal ideations because he would like attention and not because he is actually having suicidal ideations.  5/10: placed on legal hold.  Patient is still on legal hold , pending placement to inpatient psych facility    Obstructive uropathy- (present on admission)  Assessment & Plan  -Trial off of flomax given his hypotension.  Discontinued 4/6/2021. No retention continue off Flomax    Paroxysmal atrial fibrillation (HCC)- (present on admission)  Assessment & Plan  -CHADSVASC 3 (HTN, CHF, DM).   -TTE Feb 2021: EF 60%.  -Continue metoprolol, digoxin, and eliquis.    Chronic venous insufficiency of lower extremity- (present on admission)  Assessment & Plan  -Continue compression stockings.    Chronic heart failure with preserved ejection fraction (HCC)- (present on admission)  Assessment & Plan  -Now stable.  -Pulmonary edema noted in mid-March, was placed on IV diuresis though that was held when he developed a gram-negative gina bacteremia and sepsis.  Currently doing well from a respiratory standpoint, no shortness of breath, lungs clear to auscultation bilaterally, not requiring oxygen " supplementation.  -Repeat echocardiogram showed improvement in his ejection fraction to 60%.    -Continue metoprolol succinate.  -Lisinopril held per cardiology recommendations due to hypotension.    Diabetes mellitus type 2, insulin dependent (HCC)- (present on admission)  Assessment & Plan  -A1c 5.2% on 2/14/2021.  -Continue diabetic diet.  -Continue lantus at current dose   -Hypoglycemia protocol in place if needed.      Major depressive disorder, recurrent, severe with psychotic features (HCC)- (present on admission)  Assessment & Plan  -Management as listed under 'anxiety and depression'.    Colostomy present (HCC)- (present on admission)  Assessment & Plan  -History of fall at home Nov 2020 on left side with perforated colon and splenic fluid collection/hematoma s/p segmental colectomy, colostomy, mobilization of the splenic flexure, wound vac, and I&D Nov 2020.  -Wound care consulted for ostomy care, but he was non-participatory. He is now engaging in ostomy care with his bedside RNs, encouraged to be observant but do it independently.  -Patient must be independently able to care for his ostomy before inpatient psychiatry will accept him.  5/14: Discussed with patient's primary surgeon, Dr. Desouza, he is currently out of town and will reevaluate the patient on 5/24 with repeat CMP in prealbumin to check patient nutrition status for colostomy reversal.  Stated that information to the patient this morning.    Anxiety and depression- (present on admission)  Assessment & Plan  -Legal hold, awaiting acceptance at psych facility ([facility] will take in May).  -Continue paxil, klonopin & geodon.     Hypothyroidism- (present on admission)  Assessment & Plan  -TFTs in Feb were WNL. Continue levothyroxine at current dose.     Adrenal insufficiency (HCC)- (present on admission)  Assessment & Plan  -Started PO hydrocortisone 4/7/2021. Continue florinef.   -Needs outpatient endocrinology follow up.  -Monitor potassium while  on florinef. 4.4 on 4/7/2021.  -Repeat orthostatics periodically if symptomatic         Debility- (present on admission)  Assessment & Plan  -Cleared by PT/OT.  -Out of bed for each meal and ambulate in the hallways at least twice per shift.    Orthostatic hypotension- (present on admission)  Assessment & Plan  -Multifactorial including adrenal insufficiency, medications (beta blocker, CCB, tamsulosin), diabetic autonomic dysfunction, and venous insufficiency.  -EF is noted to be 60%, significant improvement from prior (previously 35%).  -Per cardiology, no further cardiac work-up needed.  Recommends follow-up with them as outpatient.  -Fall precautions.  -Continue to educate on the importance of slow positional changes.   - Started PO hydrocortisone 4/7/2021 for AI. Continuing florinef.   -Decreased toprolol which allowed for discontinuation of midodrine  Improved     Mixed hyperlipidemia- (present on admission)  Assessment & Plan  Lab Results   Component Value Date/Time    CHOLSTRLTOT 146 02/14/2021 01:30 AM    LDL 82 02/14/2021 01:30 AM    HDL 32 (A) 02/14/2021 01:30 AM    TRIGLYCERIDE 160 (H) 02/14/2021 01:30 AM   -Continue atorvastatin.     Lower limb amputation, great toe (HCC)- (present on admission)  Assessment & Plan  -History of, 2018.  Site well-healed.    Class 2 severe obesity with serious comorbidity and body mass index (BMI) of 35.0 to 35.9 in adult (HCC)- (present on admission)  Assessment & Plan  Body mass index is 35.19 kg/m².  -Could benefit from outpatient weight loss counseling which can be arranged through his PCP after hospital discharge.       VTE prophylaxis: Eliquis

## 2021-05-17 NOTE — CARE PLAN
Problem: Psychosocial  Goal: Patient's ability to identify and develop effective coping behaviors will improve  Outcome: Progressing  Goal: Patient's ability to identify and utilize available support systems will improve  Outcome: Progressing   The patient is Stable - Low risk of patient condition declining or worsening    Shift Goals  Clinical Goals: safety     Progress made toward(s) clinical / shift goals:  Patient denies SI or HI. Report some SI thoughts to day shift RN. Denies any SI thoughts this shift.     Patient is not progressing towards the following goals:Patient is intermittently still having SI. Patient has a tele monitor and is Q15 checks. Documented in flowsheet.

## 2021-05-17 NOTE — PROGRESS NOTES
Bedside report completed with day shift RN and patient. Fall precautions in place and hourly rounding continued. Patient vital signs /86   Pulse 89   Temp 36.3 °C (97.3 °F) (Temporal)   Resp 18   Ht 1.829 m (6')   Wt 116 kg (256 lb 13.4 oz)   SpO2 95%   BMI 34.83 kg/m² . Full assessment completed in flowsheet. Assumed patient care at 1900.

## 2021-05-18 PROCEDURE — 700102 HCHG RX REV CODE 250 W/ 637 OVERRIDE(OP): Performed by: STUDENT IN AN ORGANIZED HEALTH CARE EDUCATION/TRAINING PROGRAM

## 2021-05-18 PROCEDURE — 700102 HCHG RX REV CODE 250 W/ 637 OVERRIDE(OP): Performed by: HOSPITALIST

## 2021-05-18 PROCEDURE — A9270 NON-COVERED ITEM OR SERVICE: HCPCS | Performed by: NURSE PRACTITIONER

## 2021-05-18 PROCEDURE — A9270 NON-COVERED ITEM OR SERVICE: HCPCS | Performed by: HOSPITALIST

## 2021-05-18 PROCEDURE — 700102 HCHG RX REV CODE 250 W/ 637 OVERRIDE(OP): Performed by: NURSE PRACTITIONER

## 2021-05-18 PROCEDURE — A9270 NON-COVERED ITEM OR SERVICE: HCPCS | Performed by: STUDENT IN AN ORGANIZED HEALTH CARE EDUCATION/TRAINING PROGRAM

## 2021-05-18 PROCEDURE — 770001 HCHG ROOM/CARE - MED/SURG/GYN PRIV*

## 2021-05-18 PROCEDURE — 99231 SBSQ HOSP IP/OBS SF/LOW 25: CPT | Performed by: STUDENT IN AN ORGANIZED HEALTH CARE EDUCATION/TRAINING PROGRAM

## 2021-05-18 RX ADMIN — METOPROLOL SUCCINATE 12.5 MG: 25 TABLET, EXTENDED RELEASE ORAL at 05:12

## 2021-05-18 RX ADMIN — HYDROCORTISONE 10 MG: 10 TABLET ORAL at 05:13

## 2021-05-18 RX ADMIN — CLONAZEPAM 1 MG: 1 TABLET ORAL at 17:52

## 2021-05-18 RX ADMIN — FLUDROCORTISONE ACETATE 0.1 MG: 0.1 TABLET ORAL at 05:12

## 2021-05-18 RX ADMIN — APIXABAN 5 MG: 5 TABLET, FILM COATED ORAL at 08:05

## 2021-05-18 RX ADMIN — ATORVASTATIN CALCIUM 40 MG: 40 TABLET, FILM COATED ORAL at 17:53

## 2021-05-18 RX ADMIN — ZIPRASIDONE HYDROCHLORIDE 60 MG: 60 CAPSULE ORAL at 08:06

## 2021-05-18 RX ADMIN — ZIPRASIDONE HYDROCHLORIDE 60 MG: 60 CAPSULE ORAL at 20:15

## 2021-05-18 RX ADMIN — APIXABAN 5 MG: 5 TABLET, FILM COATED ORAL at 20:15

## 2021-05-18 RX ADMIN — PAROXETINE HYDROCHLORIDE 20 MG: 20 TABLET, FILM COATED ORAL at 08:05

## 2021-05-18 RX ADMIN — HYDROCORTISONE 5 MG: 10 TABLET ORAL at 15:28

## 2021-05-18 RX ADMIN — DOCUSATE SODIUM 50 MG AND SENNOSIDES 8.6 MG 2 TABLET: 8.6; 5 TABLET, FILM COATED ORAL at 05:12

## 2021-05-18 RX ADMIN — DIGOXIN 125 MCG: 125 TABLET ORAL at 17:53

## 2021-05-18 RX ADMIN — POTASSIUM CHLORIDE 20 MEQ: 20 TABLET, EXTENDED RELEASE ORAL at 05:12

## 2021-05-18 RX ADMIN — CLONAZEPAM 0.5 MG: 0.5 TABLET ORAL at 08:05

## 2021-05-18 RX ADMIN — LEVOTHYROXINE SODIUM 112 MCG: 0.11 TABLET ORAL at 05:12

## 2021-05-18 ASSESSMENT — ENCOUNTER SYMPTOMS
ABDOMINAL PAIN: 0
ROS GI COMMENTS: COLOSTOMY IN PLACE
DIARRHEA: 0
NERVOUS/ANXIOUS: 0
SHORTNESS OF BREATH: 0
VOMITING: 0
COUGH: 0
DEPRESSION: 0
NAUSEA: 0
WEAKNESS: 0
CONSTIPATION: 0
FEVER: 0

## 2021-05-18 ASSESSMENT — PAIN DESCRIPTION - PAIN TYPE: TYPE: ACUTE PAIN

## 2021-05-18 NOTE — CARE PLAN
The patient is Stable - Low risk of patient condition declining or worsening    Shift Goals  Clinical Goals: safety     Progress made toward(s) clinical / shift goals:    Problem: Knowledge Deficit - Standard  Goal: Patient and family/care givers will demonstrate understanding of plan of care, disease process/condition, diagnostic tests and medications  Outcome: Progressing     Problem: Depression  Goal: Patient and family/caregiver will verbalize accurate information about at least two of the possible causes of depression, three-four of the signs and symptoms of depression  Outcome: Progressing     Problem: Provide Safe Environment  Goal: Suicide environmental safety, protocols, policies, and practices will be implemented  Outcome: Progressing     Problem: Psychosocial  Goal: Patient's ability to identify and develop effective coping behaviors will improve  Outcome: Progressing  Goal: Patient's ability to identify and utilize available support systems will improve  Outcome: Progressing     Problem: Hemodynamics  Goal: Patient's hemodynamics, fluid balance and neurologic status will be stable or improve  Outcome: Progressing     Problem: Fluid Volume  Goal: Fluid volume balance will be maintained  Outcome: Progressing     Problem: Urinary - Renal Perfusion  Goal: Ability to achieve and maintain adequate renal perfusion and functioning will improve  Outcome: Progressing     Problem: Respiratory  Goal: Patient will achieve/maintain optimum respiratory ventilation and gas exchange  Outcome: Progressing    Goal: Successful weaning off mechanical ventilator, spontaneously maintains adequate gas exchange  Outcome: Progressing  Goal: Patient will be able to express needs and understand communication  Outcome: Progressing     Problem: Physical Regulation  Goal: Diagnostic test results will improve  Outcome: Progressing  Goal: Signs and symptoms of infection will decrease  Outcome: Progressing

## 2021-05-18 NOTE — DISCHARGE PLANNING
@9011  Agency/Facility Name: [facility]  Spoke To: Jorgito  Outcome: Will need more specific notes stating patient is able to 100% take care of colostomy care himself.    Agency/Facility Name: [facility]  Outcome: Left message, awaiting call back.

## 2021-05-18 NOTE — PROGRESS NOTES
Hospital Medicine Twice Weekly Progress Note    Date of Service  5/18/2021      Chief Complaint  Suicidal ideation    Hospital Course  Mr. Castro is a 56 year-old man with a past medical history significant for atrial fibrillation, secondary hypercoagulable state, diabetes mellitus complicated by diabetic neuropathy, chronic systolic CHF, colostomy placement, orthostatic hypotension (maintained on florinef), and dyslipidemia who presented to the ED on 2/9/2021 after he became dizzy and had a syncopal episode at home.  In the ED he was noted to be hypotensive and bradycardic. Cardiology was consulted and recommended stopping all AV sanket blocking medications.  He remained orthostatic during his hospitalization so midodrine was started.  An echocardiogram was obtained and showed mild mitral regurgitation and an EF of 60% which was improved from his previous echocardiogram obtained on 11/2020 (previously 35%).  CTAs of the head and neck were done and were unremarkable outside of distal bilateral ICA tortuosity.  Also during his hospitalization he expressed desire to kill himself.  He has a colostomy and has become overwhelmed by caring for it.  He was subsequently placed on a legal hold and psychiatry was consulted.  He is now pending [facility] placement at the 6-month db for maturity of his colostomy.    Interval Problem Update  I evaluated and examined the patient at the bedside.  Labs and imaging were reviewed. Care plan was discussed with the patient and bedside nurse.    Discussed with patient's primary surgeon, Dr. Desouza, he is currently out of town and will reevaluate the patient on 5/24 with repeat CMP & prealbumin to check patient's nutrition status for colostomy reversal.   Psychiatry re-evaluated the patient on 5/15 and extended the legal hold which was restarted on 5/10. Tele sitter.     Pending placement to inpatient psych facility  Patient is doing well with colostomy care.      Consultants/Specialty  Cardiology  Psychiatry  General surgery    Code Status  Full Code    Disposition  Placement to inpatient psych facility    Review of Systems  Review of Systems   Constitutional: Negative for fever and malaise/fatigue.   Respiratory: Negative for cough and shortness of breath.    Cardiovascular: Negative for chest pain and leg swelling.   Gastrointestinal: Negative for abdominal pain, constipation, diarrhea, nausea and vomiting.        Colostomy in place    Neurological: Negative for weakness.   Psychiatric/Behavioral: Negative for depression and suicidal ideas. The patient is not nervous/anxious.    All other systems reviewed and are negative.     Physical Exam  Temp:  [36.1 °C (96.9 °F)-36.3 °C (97.4 °F)] 36.3 °C (97.4 °F)  Pulse:  [69-84] 74  Resp:  [16-18] 17  BP: (125-141)/(62-82) 125/62  SpO2:  [95 %-98 %] 95 %    Physical Exam  Vitals and nursing note reviewed.   Constitutional:       General: He is awake. He is not in acute distress.     Appearance: Normal appearance. He is well-developed. He is not ill-appearing.   HENT:      Head: Normocephalic and atraumatic.      Mouth/Throat:      Lips: Pink.      Mouth: Mucous membranes are moist.   Eyes:      General: Lids are normal.      Conjunctiva/sclera: Conjunctivae normal.      Pupils: Pupils are equal, round, and reactive to light.   Cardiovascular:      Rate and Rhythm: Normal rate and regular rhythm.      Pulses: Normal pulses.      Heart sounds: Normal heart sounds.   Pulmonary:      Effort: Pulmonary effort is normal. No respiratory distress.      Breath sounds: Normal breath sounds. No decreased breath sounds or wheezing.   Abdominal:      General: Bowel sounds are normal. There is no distension.      Palpations: Abdomen is soft.      Tenderness: There is no abdominal tenderness. There is no guarding.       Musculoskeletal:      Cervical back: Neck supple.      Right lower leg: No edema.      Left lower leg: No edema.   Skin:      General: Skin is warm and dry.      Coloration: Skin is pale.   Neurological:      General: No focal deficit present.      Mental Status: He is alert and oriented to person, place, and time.      Cranial Nerves: No cranial nerve deficit.   Psychiatric:         Attention and Perception: Attention and perception normal.         Mood and Affect: Affect normal. Mood is depressed.         Speech: Speech normal.         Behavior: Behavior normal. Behavior is cooperative.         Thought Content: Thought content does not include suicidal ideation. Thought content does not include suicidal plan.         Cognition and Memory: Memory normal.         Fluids    Intake/Output Summary (Last 24 hours) at 5/18/2021 1212  Last data filed at 5/18/2021 1100  Gross per 24 hour   Intake 240 ml   Output 1650 ml   Net -1410 ml     Laboratory          Imaging  JO-EBLUELI-0 VIEW   Final Result         No significant interval change.      DX-CHEST-LIMITED (1 VIEW)   Final Result         Diffuse interstitial prominence could relate to mild pulmonary edema or atypical infection      [id number] VIEW   Final Result      Increased colonic gas without definite bowel obstruction.      US-RENAL   Final Result      No evidence of hydronephrosis.      Lobulated kidneys bilaterally.      CT-ABDOMEN-PELVIS WITH   Final Result      1.  There is no evidence of small bowel obstruction.   2.  There is a left lower quadrant ostomy.   3.  There is fluid distention of the colon distal to the surgical material in the left mid descending colon extending all the way to the rectum. There is no pneumatosis or free air.   4.  There is cholelithiasis without biliary dilatation.   5.  There has been interval removal of the drainage catheter anterior to the spleen with minimal hypodense area in the anterior spleen again noted. There is no new fluid collection.      IR-US GUIDED PIV   Final Result    Ultrasound-guided PERIPHERAL IV INSERTION performed by    qualified  "nursing staff as above.      EC-ECHOCARDIOGRAM COMPLETE W/O CONT   Final Result      DX-LUMBAR SPINE-2 OR 3 VIEWS   Final Result      Moderate compression deformity of T11 is new compared to 2018.      Mild wedge deformity of T12 is unchanged.      Degenerative changes including facet arthropathy.      Mild retrolisthesis of L5 on S1 and L3 on L4.            Assessment/Plan  * Suicidal ideation- (present on admission)  Assessment & Plan  -Has history of MDD, recurrent with psychotic features. active SI w/ plan on admission  -Psychiatry following, recommended discontinuation of civil commitment - suspect possible malingering or secondary gain   - Evaluated bedside again and discussed with patient who states he \"wants to live\"   -Psych recommending sitter to ensure patient does not attempt actions that would extend his stay   5/5: Patient reports that he is telling staff that his suicidal ideations because he would like attention and not because he is actually having suicidal ideations.  5/10: placed on legal hold.  Patient is still on legal hold , pending placement to inpatient psych facility    Obstructive uropathy- (present on admission)  Assessment & Plan  -Trial off of flomax given his hypotension.  Discontinued 4/6/2021. No retention continue off Flomax    Paroxysmal atrial fibrillation (HCC)- (present on admission)  Assessment & Plan  -CHADSVASC 3 (HTN, CHF, DM).   -TTE Feb 2021: EF 60%.  -Continue metoprolol, digoxin, and eliquis.    Chronic venous insufficiency of lower extremity- (present on admission)  Assessment & Plan  -Continue compression stockings.    Chronic heart failure with preserved ejection fraction (HCC)- (present on admission)  Assessment & Plan  -Now stable.  -Pulmonary edema noted in mid-March, was placed on IV diuresis though that was held when he developed a gram-negative gina bacteremia and sepsis.  Currently doing well from a respiratory standpoint, no shortness of breath, lungs clear to " auscultation bilaterally, not requiring oxygen supplementation.  -Repeat echocardiogram showed improvement in his ejection fraction to 60%.    -Continue metoprolol succinate.  -Lisinopril held per cardiology recommendations due to hypotension.    Diabetes mellitus type 2, insulin dependent (HCC)- (present on admission)  Assessment & Plan  -A1c 5.2% on 2/14/2021.  -Continue diabetic diet.  -Continue lantus at current dose   -Hypoglycemia protocol in place if needed.      Major depressive disorder, recurrent, severe with psychotic features (HCC)- (present on admission)  Assessment & Plan  -Management as listed under 'anxiety and depression'.    Colostomy present (HCC)- (present on admission)  Assessment & Plan  -History of fall at home Nov 2020 on left side with perforated colon and splenic fluid collection/hematoma s/p segmental colectomy, colostomy, mobilization of the splenic flexure, wound vac, and I&D Nov 2020.  -Wound care consulted for ostomy care, but he was non-participatory. He is now engaging in ostomy care with his bedside RNs, encouraged to be observant but do it independently.  -Patient must be independently able to care for his ostomy before inpatient psychiatry will accept him.  5/14: Discussed with patient's primary surgeon, Dr. Desouza, he is currently out of town and will reevaluate the patient on 5/24 with repeat CMP in prealbumin to check patient nutrition status for colostomy reversal.  Stated that information to the patient this morning.    Anxiety and depression- (present on admission)  Assessment & Plan  -Legal hold, awaiting acceptance at psych facility ([facility] will take in May).  -Continue paxil, klonopin & geodon.     Hypothyroidism- (present on admission)  Assessment & Plan  -TFTs in Feb were WNL. Continue levothyroxine at current dose.     Adrenal insufficiency (HCC)- (present on admission)  Assessment & Plan  -Started PO hydrocortisone 4/7/2021. Continue florinef.   -Needs outpatient  endocrinology follow up.  -Monitor potassium while on florinef. 4.4 on 4/7/2021.  -Repeat orthostatics periodically if symptomatic         Debility- (present on admission)  Assessment & Plan  -Cleared by PT/OT.  -Out of bed for each meal and ambulate in the hallways at least twice per shift.    Orthostatic hypotension- (present on admission)  Assessment & Plan  -Multifactorial including adrenal insufficiency, medications (beta blocker, CCB, tamsulosin), diabetic autonomic dysfunction, and venous insufficiency.  -EF is noted to be 60%, significant improvement from prior (previously 35%).  -Per cardiology, no further cardiac work-up needed.  Recommends follow-up with them as outpatient.  -Fall precautions.  -Continue to educate on the importance of slow positional changes.   - Started PO hydrocortisone 4/7/2021 for AI. Continuing florinef.   -Decreased toprolol which allowed for discontinuation of midodrine  Improved     Mixed hyperlipidemia- (present on admission)  Assessment & Plan  Lab Results   Component Value Date/Time    CHOLSTRLTOT 146 02/14/2021 01:30 AM    LDL 82 02/14/2021 01:30 AM    HDL 32 (A) 02/14/2021 01:30 AM    TRIGLYCERIDE 160 (H) 02/14/2021 01:30 AM   -Continue atorvastatin.     Lower limb amputation, great toe (HCC)- (present on admission)  Assessment & Plan  -History of, 2018.  Site well-healed.    Class 2 severe obesity with serious comorbidity and body mass index (BMI) of 35.0 to 35.9 in adult (HCC)- (present on admission)  Assessment & Plan  Body mass index is 35.19 kg/m².  -Could benefit from outpatient weight loss counseling which can be arranged through his PCP after hospital discharge.       VTE prophylaxis: Eliquis

## 2021-05-18 NOTE — DISCHARGE PLANNING
Legal Hold     Referral: Legal Hold Court     Intervention: Pt presented for legal hold meeting with  via video conferencing to discuss legal options of contesting hold and meeting with the court doctors tomorrow afternoon, or not contesting legal hold and continuing the hold for one week.  advised that pt's legal hold will be be continued for one week (5/27).       Plan: Pt's legal hold has been continued for one week. Pt awaiting transfer to an in patient psych facility.

## 2021-05-18 NOTE — CARE PLAN
Problem: Knowledge Deficit - Standard  Goal: Patient and family/care givers will demonstrate understanding of plan of care, disease process/condition, diagnostic tests and medications  Outcome: Progressing     Problem: Depression  Goal: Patient and family/caregiver will verbalize accurate information about at least two of the possible causes of depression, three-four of the signs and symptoms of depression  Outcome: Progressing     Problem: Provide Safe Environment  Goal: Suicide environmental safety, protocols, policies, and practices will be implemented  Outcome: Progressing   The patient is Stable - Low risk of patient condition declining or worsening    Shift Goals  Clinical Goals: safety     Progress made toward(s) clinical / shift goals:  Patient verbalizes understanding and does not currently demonstrate unsafe behaviors    Patient is not progressing towards the following goals:

## 2021-05-19 PROCEDURE — 700102 HCHG RX REV CODE 250 W/ 637 OVERRIDE(OP): Performed by: HOSPITALIST

## 2021-05-19 PROCEDURE — A9270 NON-COVERED ITEM OR SERVICE: HCPCS | Performed by: NURSE PRACTITIONER

## 2021-05-19 PROCEDURE — 700102 HCHG RX REV CODE 250 W/ 637 OVERRIDE(OP): Performed by: NURSE PRACTITIONER

## 2021-05-19 PROCEDURE — 700102 HCHG RX REV CODE 250 W/ 637 OVERRIDE(OP): Performed by: STUDENT IN AN ORGANIZED HEALTH CARE EDUCATION/TRAINING PROGRAM

## 2021-05-19 PROCEDURE — A9270 NON-COVERED ITEM OR SERVICE: HCPCS | Performed by: STUDENT IN AN ORGANIZED HEALTH CARE EDUCATION/TRAINING PROGRAM

## 2021-05-19 PROCEDURE — 770001 HCHG ROOM/CARE - MED/SURG/GYN PRIV*

## 2021-05-19 PROCEDURE — A9270 NON-COVERED ITEM OR SERVICE: HCPCS | Performed by: HOSPITALIST

## 2021-05-19 PROCEDURE — 99231 SBSQ HOSP IP/OBS SF/LOW 25: CPT | Performed by: STUDENT IN AN ORGANIZED HEALTH CARE EDUCATION/TRAINING PROGRAM

## 2021-05-19 RX ORDER — PAROXETINE HYDROCHLORIDE 20 MG/1
20 TABLET, FILM COATED ORAL DAILY
Qty: 30 TABLET | Status: SHIPPED
Start: 2021-05-20 | End: 2022-11-05

## 2021-05-19 RX ORDER — ZIPRASIDONE HYDROCHLORIDE 60 MG/1
60 CAPSULE ORAL 2 TIMES DAILY
Qty: 60 CAPSULE | Status: SHIPPED
Start: 2021-05-19 | End: 2022-11-05

## 2021-05-19 RX ORDER — SIMETHICONE 80 MG
80 TABLET,CHEWABLE ORAL 3 TIMES DAILY PRN
Qty: 30 TABLET | Refills: 3 | Status: SHIPPED | OUTPATIENT
Start: 2021-05-19 | End: 2022-11-05

## 2021-05-19 RX ORDER — TAMSULOSIN HYDROCHLORIDE 0.4 MG/1
0.4 CAPSULE ORAL
Qty: 30 CAPSULE | Refills: 0 | Status: SHIPPED | OUTPATIENT
Start: 2021-05-19 | End: 2021-07-06

## 2021-05-19 RX ORDER — DIGOXIN 125 MCG
125 TABLET ORAL DAILY
Qty: 30 TABLET | Status: SHIPPED
Start: 2021-05-19 | End: 2021-07-27

## 2021-05-19 RX ORDER — ATORVASTATIN CALCIUM 40 MG/1
40 TABLET, FILM COATED ORAL EVERY EVENING
Qty: 30 TABLET | Refills: 0 | Status: SHIPPED | OUTPATIENT
Start: 2021-05-19 | End: 2021-06-18

## 2021-05-19 RX ORDER — FLUDROCORTISONE ACETATE 0.1 MG/1
0.1 TABLET ORAL EVERY MORNING
Qty: 30 TABLET | Refills: 0 | Status: SHIPPED | OUTPATIENT
Start: 2021-05-19 | End: 2021-07-06

## 2021-05-19 RX ORDER — HYDROCORTISONE 5 MG/1
5 TABLET ORAL
Qty: 120 TABLET | Status: SHIPPED
Start: 2021-05-19 | End: 2022-11-05

## 2021-05-19 RX ORDER — LEVOTHYROXINE SODIUM 112 UG/1
112 TABLET ORAL
Qty: 30 TABLET | Refills: 0 | Status: SHIPPED | OUTPATIENT
Start: 2021-05-19 | End: 2021-07-06

## 2021-05-19 RX ORDER — HYDROCORTISONE 10 MG/1
10 TABLET ORAL EVERY MORNING
Qty: 30 TABLET | Status: SHIPPED
Start: 2021-05-20 | End: 2021-07-06

## 2021-05-19 RX ADMIN — POTASSIUM CHLORIDE 20 MEQ: 20 TABLET, EXTENDED RELEASE ORAL at 05:45

## 2021-05-19 RX ADMIN — PAROXETINE HYDROCHLORIDE 20 MG: 20 TABLET, FILM COATED ORAL at 08:26

## 2021-05-19 RX ADMIN — ZIPRASIDONE HYDROCHLORIDE 60 MG: 60 CAPSULE ORAL at 08:26

## 2021-05-19 RX ADMIN — METOPROLOL SUCCINATE 12.5 MG: 25 TABLET, EXTENDED RELEASE ORAL at 05:45

## 2021-05-19 RX ADMIN — HYDROCORTISONE 5 MG: 10 TABLET ORAL at 17:00

## 2021-05-19 RX ADMIN — ZIPRASIDONE HYDROCHLORIDE 60 MG: 60 CAPSULE ORAL at 21:19

## 2021-05-19 RX ADMIN — FLUDROCORTISONE ACETATE 0.1 MG: 0.1 TABLET ORAL at 05:45

## 2021-05-19 RX ADMIN — SIMETHICONE 80 MG: 80 TABLET, CHEWABLE ORAL at 05:45

## 2021-05-19 RX ADMIN — APIXABAN 5 MG: 5 TABLET, FILM COATED ORAL at 21:19

## 2021-05-19 RX ADMIN — APIXABAN 5 MG: 5 TABLET, FILM COATED ORAL at 08:26

## 2021-05-19 RX ADMIN — ATORVASTATIN CALCIUM 40 MG: 40 TABLET, FILM COATED ORAL at 17:00

## 2021-05-19 RX ADMIN — HYDROCORTISONE 10 MG: 10 TABLET ORAL at 05:44

## 2021-05-19 RX ADMIN — LEVOTHYROXINE SODIUM 112 MCG: 0.11 TABLET ORAL at 05:45

## 2021-05-19 RX ADMIN — CLONAZEPAM 1 MG: 1 TABLET ORAL at 17:00

## 2021-05-19 RX ADMIN — DOCUSATE SODIUM 50 MG AND SENNOSIDES 8.6 MG 2 TABLET: 8.6; 5 TABLET, FILM COATED ORAL at 05:44

## 2021-05-19 RX ADMIN — DIGOXIN 125 MCG: 125 TABLET ORAL at 17:00

## 2021-05-19 RX ADMIN — CLONAZEPAM 0.5 MG: 0.5 TABLET ORAL at 08:26

## 2021-05-19 ASSESSMENT — ENCOUNTER SYMPTOMS
COUGH: 0
SHORTNESS OF BREATH: 0
ROS GI COMMENTS: COLOSTOMY IN PLACE
CONSTIPATION: 0
FEVER: 0
NERVOUS/ANXIOUS: 0
DEPRESSION: 0
DIARRHEA: 0
ABDOMINAL PAIN: 0
NAUSEA: 0
WEAKNESS: 0
VOMITING: 0

## 2021-05-19 ASSESSMENT — PAIN DESCRIPTION - PAIN TYPE: TYPE: ACUTE PAIN

## 2021-05-19 NOTE — DISCHARGE SUMMARY
Discharge Summary    CHIEF COMPLAINT ON ADMISSION  Chief Complaint   Patient presents with   • Syncope     X1 this morning when standing up to go to bathroom       Reason for Admission  ems     CODE STATUS  Full Code    HPI & HOSPITAL COURSE    Mr. Castro is a 56 year-old man with a past medical history significant for atrial fibrillation, secondary hypercoagulable state, diabetes mellitus complicated by diabetic neuropathy, chronic systolic CHF, colostomy placement, orthostatic hypotension (maintained on florinef), and dyslipidemia who presented to the ED on 2/9/2021 after he became dizzy and had a syncopal episode at home.  In the ED he was noted to be hypotensive and bradycardic. Cardiology was consulted and recommended stopping all AV sanket blocking medications.  He remained orthostatic during his hospitalization so midodrine was started and then transit to fludrocortisone bid. BP stabilized after.  An echocardiogram was obtained and showed mild mitral regurgitation and an EF of 60% which was improved from his previous echocardiogram obtained on 11/2020 (previously 35%).  CTAs of the head and neck were done and were unremarkable outside of distal bilateral ICA tortuosity.  Also during his hospitalization he expressed desire to kill himself. Psychiatry consulted, medication adjusted and legal hold placed.   He has a colostomy and has become overwhelmed by caring for it. He is now pending [facility] placement at the 6-month db for maturity of his colostomy. And he is medically cleared for psychaittric inpatient treatment.      Therefore, he is discharged in fair and stable condition to a psychiatric hospital.    The patient met 2-midnight criteria for an inpatient stay at the time of discharge.      FOLLOW UP ITEMS POST DISCHARGE  psychiatrist    reevaluate the patient on 5/24 with repeat CMP & prealbumin to check patient's nutrition status for colostomy reversal.     DISCHARGE DIAGNOSES  Principal Problem:     Suicidal ideation POA: Yes  Active Problems:    Class 2 severe obesity with serious comorbidity and body mass index (BMI) of 35.0 to 35.9 in adult ([facility]) POA: Yes    Lower limb amputation, great toe ([facility]) POA: Yes    Mixed hyperlipidemia POA: Yes    Orthostatic hypotension POA: Yes    Debility POA: Yes    Adrenal insufficiency ([facility]) POA: Yes      Overview: Cortisol 17 in stress state      Hydrocortisone started    Hypothyroidism POA: Yes    Anxiety and depression POA: Yes    Colostomy present ([facility]) POA: Yes    Major depressive disorder, recurrent, severe with psychotic features ([facility]) POA: Yes    Diabetes mellitus type 2, insulin dependent ([facility]) POA: Yes    Chronic heart failure with preserved ejection fraction ([facility]) POA: Yes    Chronic venous insufficiency of lower extremity POA: Yes    Paroxysmal atrial fibrillation ([facility]) POA: Yes    Obstructive uropathy POA: Yes  Resolved Problems:    Hyponatremia POA: No    Hypokalemia POA: No    Hypomagnesemia POA: No    Chest pain POA: Yes    Essential hypertension POA: Yes    Nonischemic cardiomyopathy (HCC) POA: Yes    Chronic systolic heart failure ([facility]) POA: Yes    Sepsis with acute renal failure without septic shock ([facility]) POA: No    Acute renal failure superimposed on stage 3 chronic kidney disease ([facility]) POA: Yes    Elevated troponin POA: Yes    Syncope due to orthostatic hypotension POA: Yes    Normal anion gap metabolic acidosis POA: No    Abdominal bloating POA: No    Cardiogenic pulmonary edema (HCC) POA: No    Bacteremia POA: No    Rash POA: Yes      FOLLOW UP  Future Appointments   Date Time Provider Department Center   5/26/2021  2:00 PM [facility] None     ELIZABETH Terrazas  [address] [place] [address] [phone]    Call  Please call your Primary Care Office to schedule a follow up appointment. Thank You.      MEDICATIONS ON DISCHARGE     Medication List      START taking these medications      Instructions   apixaban 5mg  Tabs  Commonly known as: ELIQUIS   Take 1 tablet by mouth 2 times a day. Indications: Thromboembolism secondary to Atrial Fibrillation  Dose: 5 mg     * hydrocortisone 5 MG Tabs  Commonly known as: CORTEF   Take 1 tablet by mouth every day.  Dose: 5 mg     * hydrocortisone 10 MG Tabs  Start taking on: May 20, 2021  Commonly known as: CORTEF   Take 1 tablet by mouth every morning.  Dose: 10 mg     PARoxetine 20 MG Tabs  Start taking on: May 20, 2021  Commonly known as: PAXIL   Take 1 tablet by mouth every day.  Dose: 20 mg     simethicone 80 MG Chew  Commonly known as: MYLICON   Chew 1 tablet 3 times a day as needed (Gas pains).  Dose: 80 mg     ziprasidone 60 MG Caps  Commonly known as: GEODON   Take 1 capsule by mouth 2 times a day.  Dose: 60 mg         * This list has 2 medication(s) that are the same as other medications prescribed for you. Read the directions carefully, and ask your doctor or other care provider to review them with you.            CHANGE how you take these medications      Instructions   digoxin 125 MCG Tabs  What changed: when to take this  Commonly known as: LANOXIN   Take 1 tablet by mouth every day at 6 PM.  Dose: 125 mcg        CONTINUE taking these medications      Instructions   atorvastatin 40 MG Tabs  Commonly known as: LIPITOR   Take 1 Tablet by mouth every evening for 30 days.  Dose: 40 mg     fludrocortisone 0.1 MG Tabs  Commonly known as: FLORINEF   Take 1 Tablet by mouth every morning for 30 days.  Dose: 0.1 mg     levothyroxine 112 MCG Tabs  Commonly known as: SYNTHROID   Take 1 Tablet by mouth Every morning on an empty stomach for 30 days.  Dose: 112 mcg     metoprolol SR 25 MG Tb24  Commonly known as: TOPROL XL   Take 25 mg by mouth every day.  Dose: 25 mg     tamsulosin 0.4 MG capsule  Commonly known as: FLOMAX   Take 1 Capsule by mouth 1/2 hour after breakfast for 30 days.  Dose: 0.4 mg        STOP taking these medications    DILTIAZem  MG Cp24  Commonly known as:  CARDIZEM CD     furosemide 20 MG Tabs  Commonly known as: LASIX     insulin glargine 100 UNIT/ML Sopn injection  Generic drug: insulin glargine     lisinopril 10 MG Tabs  Commonly known as: PRINIVIL     loratadine 10 MG Tabs  Commonly known as: CLARITIN     modafinil 100 MG Tabs  Commonly known as: PROVIGIL     risperiDONE 2 MG Tabs  Commonly known as: RISPERDAL            Allergies  Allergies   Allergen Reactions   • Zosyn [Piperacillin Sod-Tazobactam So] Rash and Itching     Redness/flushed throughout body.    • Daptomycin Rash     Body rash  RXN=1/2018     • Pcn [Penicillins] Hives and Rash      Rash & hives  RXN=since a kid  Tolerates cephalosporins   • Unasyn [Ampicillin-Sulbactam Sodium] Hives, Itching and Swelling   • Vancomycin Rash     Red man syndrome. Tolerates IV vancomycin at reduced infusion rate.       DIET  Orders Placed This Encounter   Procedures   • Diet Order Diet: Consistent CHO (Diabetic); Miscellaneous modifications: (optional): Finger Foods ; Tray Modifications (optional): No Sharps (Paperware)     Paperware only on meal tray.     Standing Status:   Standing     Number of Occurrences:   1     Order Specific Question:   Diet:     Answer:   Consistent CHO (Diabetic) [4]     Order Specific Question:   Miscellaneous modifications: (optional)     Answer:   Finger Foods  [2]     Order Specific Question:   Tray Modifications (optional)     Answer:   No Sharps (Paperware)       ACTIVITY  As tolerated.  Weight bearing as tolerated    LINES, DRAINS, AND WOUNDS  This is an automated list. Peripheral IVs will be removed prior to discharge.       Moisture Associated Skin Damage 02/09/21 Anterior (Active)   Wound Image     04/08/21 2100   NEXT Weekly Photo (Inpatient Only) 02/17/21 02/10/21 1710   Drainage Amount None 05/17/21 0900   Periwound Assessment Clean;Dry;Intact 05/17/21 0900   IAD Cleansing Soap and Water 05/17/21 0900   Periwound Protectant Skin Protectant Wipes to Periwound 05/06/21 0800   IAD  Containment Device Bowel Management System 05/17/21 0900       Wound 11/10/20 Full Thickness Wound Abdomen Midline resolving open surgical  (Active)   Wound Image   05/08/21 1655   Site Assessment VON 05/18/21 0800   Periwound Assessment VON 05/17/21 0900   Margins VON 05/17/21 0900   Closure Approximated 05/13/21 0800   Drainage Amount Small 05/13/21 0800   Drainage Description Serosanguineous 05/13/21 0800   Treatments Site care 05/13/21 0800   Wound Cleansing Approved Wound Cleanser 05/13/21 0800   Periwound Protectant Not Applicable 05/13/21 0800   Dressing Cleansing/Solutions Not Applicable 05/08/21 1655   Dressing Options Mepilex 05/17/21 2000   Dressing Changed Observed 05/15/21 1300   Dressing Status Clean;Dry;Intact 05/16/21 1946   Dressing Change/Treatment Frequency Every 72 hrs, and As Needed 05/17/21 0900   NEXT Dressing Change/Treatment Date 05/16/21 05/15/21 1300   NEXT Weekly Photo (Inpatient Only) 05/15/21 05/08/21 1655   Non-staged Wound Description Partial thickness 05/08/21 1655   Wound Length (cm) 1.5 cm 05/08/21 1655   Wound Width (cm) 2 cm 05/08/21 1655   Wound Depth (cm) 0 cm 05/08/21 1655   Wound Surface Area (cm^2) 3 cm^2 05/08/21 1655   Wound Volume (cm^3) 0 cm^3 05/08/21 1655   Wound Healing % 100 05/08/21 1655   Tunneling (cm) 0 cm 04/15/21 1356   Undermining (cm) 0 cm 04/15/21 1356   Shape oval 05/08/21 1655   Wound Odor None 05/08/21 1655   Pulses N/A 05/08/21 1655   Exposed Structures None 05/08/21 1655   WOUND NURSE ONLY - Time Spent with Patient (mins) 60 02/24/21 0900       Wound 04/09/21 Partial Thickness Wound Back Lateral;Upper (Active)   Site Assessment Clean;Dry;Intact 05/19/21 0820   Periwound Assessment Clean;Dry;Intact 05/19/21 0820   Margins Defined edges 05/18/21 0800   Closure Secondary intention 05/12/21 0800   Drainage Amount None 05/12/21 0800   Treatments Cleansed 05/16/21 1946   Wound Cleansing Soap and Water 05/16/21 1946   Dressing Options Open to Air 05/18/21 0800    Dressing Changed Observed 05/07/21 1000   Dressing Status Clean;Dry;Intact 05/16/21 1946                  MENTAL STATUS ON TRANSFER  Level of Consciousness: Alert  Orientation : Oriented x 4  Speech: Speech Clear    CONSULTATIONS  Cardiology  Psychiatry  General surgery    PROCEDURES  none    LABORATORY  Lab Results   Component Value Date    SODIUM 135 04/21/2021    POTASSIUM 3.8 04/21/2021    CHLORIDE 100 04/21/2021    CO2 28 04/21/2021    GLUCOSE 125 (H) 04/21/2021    BUN 32 (H) 04/21/2021    CREATININE 0.91 04/21/2021        Lab Results   Component Value Date    WBC 14.0 (H) 04/21/2021    HEMOGLOBIN 14.0 04/21/2021    HEMATOCRIT 43.8 04/21/2021    PLATELETCT 268 04/21/2021        Total time of the discharge process exceeds 50 minutes.

## 2021-05-19 NOTE — CARE PLAN
On legal hold for SI. Telesitter in room & Q15min checks by staff. Call light in room, most other items removed. Turns self well but instructed to request assistance if needed. Colostomy- can manage but will need assistance in disposing of the contents.    The patient is Stable - Low risk of patient condition declining or worsening    Shift Goals: Rest  Clinical Goals: Patient Safety    Progress made toward(s) clinical / shift goals:      Problem: Knowledge Deficit - Standard  Goal: Patient and family/care givers will demonstrate understanding of plan of care, disease process/condition, diagnostic tests and medications  Outcome: Progressing     Problem: Depression  Goal: Patient and family/caregiver will verbalize accurate information about at least two of the possible causes of depression, three-four of the signs and symptoms of depression  Outcome: Progressing     Problem: Provide Safe Environment  Goal: Suicide environmental safety, protocols, policies, and practices will be implemented  Outcome: Progressing     Patient is not progressing towards the following goals: N/A

## 2021-05-19 NOTE — PROGRESS NOTES
I certify that the patient requires continued medically necessary hospital services for the treatment of suicide ideation and colostomy management and will remain in the hospital for 2-3 days.  Discharge plan is anticipated to be 5/23/2021 to inpatient psychiatric facility.

## 2021-05-19 NOTE — CARE PLAN
Problem: Knowledge Deficit - Standard  Goal: Patient and family/care givers will demonstrate understanding of plan of care, disease process/condition, diagnostic tests and medications  Outcome: Progressing     Problem: Psychosocial  Goal: Patient's ability to identify and develop effective coping behaviors will improve  Outcome: Progressing   The patient is Stable - Low risk of patient condition declining or worsening    Shift Goals  Clinical Goals: patient will remain free of harm or injury     Progress made toward(s) clinical / shift goals:  Q15min checks, all needs met at this time.     Patient is not progressing towards the following goals:

## 2021-05-19 NOTE — DISCHARGE PLANNING
Received Stipulation and Order from the court continuing pt's legal hold until 5/27. Sent copy to ELISA Sutton, scanned copy into pt's chart.

## 2021-05-19 NOTE — CONSULTS
"PSYCHOLOGICAL FOLLOW-UP:  Reason for admission: Perforated sigmoid colon (HCC) [K63.1]  Length of Visit: 60min    Legal status: Legal Status: Voluntary    Chief Complaint: \"I started having those thoughts again.\"    HPI: Met with the patient for brief individual psychotherapy. Patient presented with a depressed affect and reported a \"down\" mood. He was able to laugh and smile appropriately. He denied current and active suicidal thoughts, plans, and intent. No legal hold needed at this time. However, the patient did endorse having suicidal thoughts again this past week due to increased distress with his medical conditions. The patient shared the distress he experienced this week as he encountered difficulty standing and issues with his ostomy bag. Reflected the patient's distress and explored how this increase in stress contributes to increased symptoms of depression. In particular, depressed thought patterns. Introduced the concepts of cognitive diffusion and radical acceptance. Also discussed distress tolerance skills. Patient identified word searches as a helpful distress tolerance activity. By the end of the session, the patient reported hopefulness and his intention to practice the skills learned today.     Psychiatric Examination:  Vitals: Blood pressure 106/78, pulse (!) 122, temperature 36 °C (96.8 °F), temperature source Temporal, resp. rate 20, height 1.829 m (6'), weight (!) 127.8 kg (281 lb 12 oz), SpO2 96 %.  Musculoskeletal: normal psychomotor activity, no tics or unusual mannerisms noted  Appearance and Eye Contact: appropriate dress and grooming. Behavior is calm, cooperative,  appropriate eye contact  Attention/Alertness: Alert  Thought Process: Linear, Logical and Goal Directed    Thought Content: No psychotic processes noted  Speech: Clear with normal rate and rhythm  Mood: \"down\"            Affect: depressed however laughing and smiling by the end of the session. Hopeful by the end          SI/HI: " Patient Seen in: [facility]      History   Patient presents with:  Difficulty Breathing    Stated Complaint: asthma??    HPI/Subjective:   HPI    39-year-old female with a history of asthma here today with complaints of a possible asthma exac Denies    Memory: Recent and remote memory appear intact    Orientation: alert, oriented to person, place and time  Insight into symptoms: good  Judgement into symptoms:good    ASSESSMENT: While the patient did experience suicidal thoughts this week, he is not experiencing them now nor did he have a plan or intent to commit suicide this week. Rather, he was distressed by the thoughts and was looking forward to having his psychotherapy session today. He is currently future focused. Therefore, no legal hold needed right now. Will continue to assess the patient's risk level.     DSM5 Diagnostic Considerations:   Adjustment disorder with anxious and depressed mood   Unspecified Depressive Disorder    -rule out MDD with psychotic features    PLAN:  Legal status: Voluntary  Anticipate F/U within 48 hours.   Records reviewed: yes  Will continue to follow for brief psychotherapy.   Thank you for the consult.    Madhuri Fernandez, Ph.D.       Pupils reactive bilaterally. EOMs intact. No facial droop or slurred speech. No oral pallor. Mucous membranes moist.      Neck: No cervical lymphadenopathy. No stridor. Supple. No meningsmus. Heart: S1-S2. Regular rate and rhythm.        Lungs: goo home presentation consistent with asthma exacerbation. Counseled on compliance with filling medications and having close follow-up with PCP. Counseled on avoidance of known triggers for asthma. Return to ED precautions discussed with the patient.

## 2021-05-19 NOTE — DISCHARGE PLANNING
Anticipated Discharge Disposition:   Inpatient Psych    Action:   Per chart review, pt is Legal Hold and medically cleared for discharge to inpatient psych. [facility] needs more specific notes stating patient is able to 100% take care of ostomy himself. MD and bedside RN update via Vo[employer].    Barriers to Discharge:   Inpatient psych placement acceptance and bed availability.    Plan:   Hospital Care Management will continue to follow and assist with discharge planning needs.

## 2021-05-19 NOTE — PROGRESS NOTES
Assumed care of pt this am. Pt is A&O x4. Denies pain. Pt is on a legal hold Q15 minute checks in place. Room furniture and patient belongings not in the room.

## 2021-05-19 NOTE — PROGRESS NOTES
Patient can perform self care of ostomy. 100% able and compliant with ostomy care and appliance change. Patient demonstrated appliance change to this RN.

## 2021-05-19 NOTE — DISCHARGE PLANNING
@[id number]  Agency/Facility Name: [facility]  Spoke To: Jorgito  Outcome: Got note and will give it to the MD tomorrow and inform that MD says.    @[id number]  Agency/Facility Name: [facility]  Outcome: Left message, awaiting call back.

## 2021-05-19 NOTE — PROGRESS NOTES
Hospital Medicine Twice Weekly Progress Note    Date of Service  5/19/2021      Chief Complaint  Suicidal ideation    Hospital Course  Mr. Castro is a 56 year-old man with a past medical history significant for atrial fibrillation, secondary hypercoagulable state, diabetes mellitus complicated by diabetic neuropathy, chronic systolic CHF, colostomy placement, orthostatic hypotension (maintained on florinef), and dyslipidemia who presented to the ED on 2/9/2021 after he became dizzy and had a syncopal episode at home.  In the ED he was noted to be hypotensive and bradycardic. Cardiology was consulted and recommended stopping all AV sanekt blocking medications.  He remained orthostatic during his hospitalization so midodrine was started.  An echocardiogram was obtained and showed mild mitral regurgitation and an EF of 60% which was improved from his previous echocardiogram obtained on 11/2020 (previously 35%).  CTAs of the head and neck were done and were unremarkable outside of distal bilateral ICA tortuosity.  Also during his hospitalization he expressed desire to kill himself.  He has a colostomy and has become overwhelmed by caring for it.  He was subsequently placed on a legal hold and psychiatry was consulted.  He is now pending [facility] placement at the 6-month db for maturity of his colostomy.    Interval Problem Update  I evaluated and examined the patient at the bedside.  Labs and imaging were reviewed. Care plan was discussed with the patient and bedside nurse.    Discussed with patient's primary surgeon, Dr. Desouza, he is currently out of town and will reevaluate the patient on 5/24 with repeat CMP & prealbumin to check patient's nutrition status for colostomy reversal.   Psychiatry re-evaluated the patient on 5/15 and extended the legal hold which was restarted on 5/10. Tele sitter.     Patient is doing well with colostomy care.     Still has suicide ideation, denies plan    Pending placement to  inpatient psych facility    Consultants/Specialty  Cardiology  Psychiatry  General surgery    Code Status  Full Code    Disposition  Placement to inpatient psych facility    Review of Systems  Review of Systems   Constitutional: Negative for fever and malaise/fatigue.   Respiratory: Negative for cough and shortness of breath.    Cardiovascular: Negative for chest pain and leg swelling.   Gastrointestinal: Negative for abdominal pain, constipation, diarrhea, nausea and vomiting.        Colostomy in place    Neurological: Negative for weakness.   Psychiatric/Behavioral: Negative for depression and suicidal ideas. The patient is not nervous/anxious.    All other systems reviewed and are negative.     Physical Exam  Temp:  [36.4 °C (97.5 °F)-37 °C (98.6 °F)] 36.6 °C (97.8 °F)  Pulse:  [65-85] 85  Resp:  [18] 18  BP: (113-150)/(54-97) 113/54  SpO2:  [96 %-99 %] 96 %    Physical Exam  Vitals and nursing note reviewed.   Constitutional:       General: He is awake. He is not in acute distress.     Appearance: Normal appearance. He is well-developed. He is not ill-appearing.   HENT:      Head: Normocephalic and atraumatic.      Mouth/Throat:      Lips: Pink.      Mouth: Mucous membranes are moist.   Eyes:      General: Lids are normal.      Conjunctiva/sclera: Conjunctivae normal.      Pupils: Pupils are equal, round, and reactive to light.   Cardiovascular:      Rate and Rhythm: Normal rate and regular rhythm.      Pulses: Normal pulses.      Heart sounds: Normal heart sounds.   Pulmonary:      Effort: Pulmonary effort is normal. No respiratory distress.      Breath sounds: Normal breath sounds. No decreased breath sounds or wheezing.   Abdominal:      General: Bowel sounds are normal. There is no distension.      Palpations: Abdomen is soft.      Tenderness: There is no abdominal tenderness. There is no guarding.       Musculoskeletal:      Cervical back: Neck supple.      Right lower leg: No edema.      Left lower leg: No  edema.   Skin:     General: Skin is warm and dry.      Coloration: Skin is pale.   Neurological:      General: No focal deficit present.      Mental Status: He is alert and oriented to person, place, and time.      Cranial Nerves: No cranial nerve deficit.   Psychiatric:         Attention and Perception: Attention and perception normal.         Mood and Affect: Affect normal. Mood is depressed.         Speech: Speech normal.         Behavior: Behavior normal. Behavior is cooperative.         Thought Content: Thought content does not include suicidal ideation. Thought content does not include suicidal plan.         Cognition and Memory: Memory normal.         Fluids    Intake/Output Summary (Last 24 hours) at 5/19/2021 1138  Last data filed at 5/18/2021 1300  Gross per 24 hour   Intake 240 ml   Output 500 ml   Net -260 ml     Laboratory          Imaging  [id number] VIEW   Final Result         No significant interval change.      DX-CHEST-LIMITED (1 VIEW)   Final Result         Diffuse interstitial prominence could relate to mild pulmonary edema or atypical infection      [id number] VIEW   Final Result      Increased colonic gas without definite bowel obstruction.      US-RENAL   Final Result      No evidence of hydronephrosis.      Lobulated kidneys bilaterally.      CT-ABDOMEN-PELVIS WITH   Final Result      1.  There is no evidence of small bowel obstruction.   2.  There is a left lower quadrant ostomy.   3.  There is fluid distention of the colon distal to the surgical material in the left mid descending colon extending all the way to the rectum. There is no pneumatosis or free air.   4.  There is cholelithiasis without biliary dilatation.   5.  There has been interval removal of the drainage catheter anterior to the spleen with minimal hypodense area in the anterior spleen again noted. There is no new fluid collection.      IR-US GUIDED PIV   Final Result    Ultrasound-guided PERIPHERAL IV INSERTION performed  "by    qualified nursing staff as above.      EC-ECHOCARDIOGRAM COMPLETE W/O CONT   Final Result      DX-LUMBAR SPINE-2 OR 3 VIEWS   Final Result      Moderate compression deformity of T11 is new compared to 2018.      Mild wedge deformity of T12 is unchanged.      Degenerative changes including facet arthropathy.      Mild retrolisthesis of L5 on S1 and L3 on L4.            Assessment/Plan  * Suicidal ideation- (present on admission)  Assessment & Plan  -Has history of MDD, recurrent with psychotic features. active SI w/ plan on admission  -Psychiatry following, recommended discontinuation of civil commitment - suspect possible malingering or secondary gain   - Evaluated bedside again and discussed with patient who states he \"wants to live\"   -Psych recommending sitter to ensure patient does not attempt actions that would extend his stay   5/5: Patient reports that he is telling staff that his suicidal ideations because he would like attention and not because he is actually having suicidal ideations.  5/10: placed on legal hold.  Patient is still on legal hold , pending placement to inpatient psych facility    Obstructive uropathy- (present on admission)  Assessment & Plan  -Trial off of flomax given his hypotension.  Discontinued 4/6/2021. No retention continue off Flomax    Paroxysmal atrial fibrillation (HCC)- (present on admission)  Assessment & Plan  -CHADSVASC 3 (HTN, CHF, DM).   -TTE Feb 2021: EF 60%.  -Continue metoprolol, digoxin, and eliquis.    Chronic venous insufficiency of lower extremity- (present on admission)  Assessment & Plan  -Continue compression stockings.    Chronic heart failure with preserved ejection fraction (HCC)- (present on admission)  Assessment & Plan  -Now stable.  -Pulmonary edema noted in mid-March, was placed on IV diuresis though that was held when he developed a gram-negative gina bacteremia and sepsis.  Currently doing well from a respiratory standpoint, no shortness of breath, " lungs clear to auscultation bilaterally, not requiring oxygen supplementation.  -Repeat echocardiogram showed improvement in his ejection fraction to 60%.    -Continue metoprolol succinate.  -Lisinopril held per cardiology recommendations due to hypotension.    Diabetes mellitus type 2, insulin dependent (HCC)- (present on admission)  Assessment & Plan  -A1c 5.2% on 2/14/2021.  -Continue diabetic diet.  -Continue lantus at current dose   -Hypoglycemia protocol in place if needed.      Major depressive disorder, recurrent, severe with psychotic features (HCC)- (present on admission)  Assessment & Plan  -Management as listed under 'anxiety and depression'.    Colostomy present (HCC)- (present on admission)  Assessment & Plan  -History of fall at home Nov 2020 on left side with perforated colon and splenic fluid collection/hematoma s/p segmental colectomy, colostomy, mobilization of the splenic flexure, wound vac, and I&D Nov 2020.  -Wound care consulted for ostomy care, but he was non-participatory. He is now engaging in ostomy care with his bedside RNs, encouraged to be observant but do it independently.  -Patient must be independently able to care for his ostomy before inpatient psychiatry will accept him.  5/14: Discussed with patient's primary surgeon, Dr. Desouza, he is currently out of town and will reevaluate the patient on 5/24 with repeat CMP in prealbumin to check patient nutrition status for colostomy reversal.  Stated that information to the patient this morning.    Anxiety and depression- (present on admission)  Assessment & Plan  -Legal hold, awaiting acceptance at psych facility ([facility] [place] [facility]  -Continue paxil, klonopin & geodon.     Hypothyroidism- (present on admission)  Assessment & Plan  -TFTs in Feb were WNL. Continue levothyroxine at current dose.     Adrenal insufficiency (HCC)- (present on admission)  Assessment & Plan  -Started PO hydrocortisone 4/7/2021. Continue florinef.   -Needs  outpatient endocrinology follow up.  -Monitor potassium while on florinef. 4.4 on 4/7/2021.  -Repeat orthostatics periodically if symptomatic         Debility- (present on admission)  Assessment & Plan  -Cleared by PT/OT.  -Out of bed for each meal and ambulate in the hallways at least twice per shift.    Orthostatic hypotension- (present on admission)  Assessment & Plan  -Multifactorial including adrenal insufficiency, medications (beta blocker, CCB, tamsulosin), diabetic autonomic dysfunction, and venous insufficiency.  -EF is noted to be 60%, significant improvement from prior (previously 35%).  -Per cardiology, no further cardiac work-up needed.  Recommends follow-up with them as outpatient.  -Fall precautions.  -Continue to educate on the importance of slow positional changes.   - Started PO hydrocortisone 4/7/2021 for AI. Continuing florinef.   -Decreased toprolol which allowed for discontinuation of midodrine  Improved     Mixed hyperlipidemia- (present on admission)  Assessment & Plan  Lab Results   Component Value Date/Time    CHOLSTRLTOT 146 02/14/2021 01:30 AM    LDL 82 02/14/2021 01:30 AM    HDL 32 (A) 02/14/2021 01:30 AM    TRIGLYCERIDE 160 (H) 02/14/2021 01:30 AM   -Continue atorvastatin.     Lower limb amputation, great toe (HCC)- (present on admission)  Assessment & Plan  -History of, 2018.  Site well-healed.    Class 2 severe obesity with serious comorbidity and body mass index (BMI) of 35.0 to 35.9 in adult (HCC)- (present on admission)  Assessment & Plan  Body mass index is 35.19 kg/m².  -Could benefit from outpatient weight loss counseling which can be arranged through his PCP after hospital discharge.       VTE prophylaxis: Eliquis

## 2021-05-20 PROCEDURE — A9270 NON-COVERED ITEM OR SERVICE: HCPCS | Performed by: STUDENT IN AN ORGANIZED HEALTH CARE EDUCATION/TRAINING PROGRAM

## 2021-05-20 PROCEDURE — 700102 HCHG RX REV CODE 250 W/ 637 OVERRIDE(OP): Performed by: NURSE PRACTITIONER

## 2021-05-20 PROCEDURE — 99231 SBSQ HOSP IP/OBS SF/LOW 25: CPT | Performed by: STUDENT IN AN ORGANIZED HEALTH CARE EDUCATION/TRAINING PROGRAM

## 2021-05-20 PROCEDURE — 700102 HCHG RX REV CODE 250 W/ 637 OVERRIDE(OP): Performed by: STUDENT IN AN ORGANIZED HEALTH CARE EDUCATION/TRAINING PROGRAM

## 2021-05-20 PROCEDURE — 770001 HCHG ROOM/CARE - MED/SURG/GYN PRIV*

## 2021-05-20 PROCEDURE — A9270 NON-COVERED ITEM OR SERVICE: HCPCS | Performed by: NURSE PRACTITIONER

## 2021-05-20 PROCEDURE — A9270 NON-COVERED ITEM OR SERVICE: HCPCS | Performed by: HOSPITALIST

## 2021-05-20 PROCEDURE — 700102 HCHG RX REV CODE 250 W/ 637 OVERRIDE(OP): Performed by: HOSPITALIST

## 2021-05-20 RX ADMIN — ATORVASTATIN CALCIUM 40 MG: 40 TABLET, FILM COATED ORAL at 17:13

## 2021-05-20 RX ADMIN — APIXABAN 5 MG: 5 TABLET, FILM COATED ORAL at 20:46

## 2021-05-20 RX ADMIN — METOPROLOL SUCCINATE 12.5 MG: 25 TABLET, EXTENDED RELEASE ORAL at 04:28

## 2021-05-20 RX ADMIN — FLUDROCORTISONE ACETATE 0.1 MG: 0.1 TABLET ORAL at 04:28

## 2021-05-20 RX ADMIN — DIGOXIN 125 MCG: 125 TABLET ORAL at 17:11

## 2021-05-20 RX ADMIN — CLONAZEPAM 0.5 MG: 0.5 TABLET ORAL at 08:21

## 2021-05-20 RX ADMIN — CLONAZEPAM 1 MG: 1 TABLET ORAL at 17:13

## 2021-05-20 RX ADMIN — PAROXETINE HYDROCHLORIDE 20 MG: 20 TABLET, FILM COATED ORAL at 08:20

## 2021-05-20 RX ADMIN — POTASSIUM CHLORIDE 20 MEQ: 20 TABLET, EXTENDED RELEASE ORAL at 04:28

## 2021-05-20 RX ADMIN — HYDROCORTISONE 10 MG: 10 TABLET ORAL at 04:29

## 2021-05-20 RX ADMIN — APIXABAN 5 MG: 5 TABLET, FILM COATED ORAL at 08:21

## 2021-05-20 RX ADMIN — LORAZEPAM 0.5 MG: 1 TABLET ORAL at 17:13

## 2021-05-20 RX ADMIN — HYDROCORTISONE 5 MG: 10 TABLET ORAL at 17:14

## 2021-05-20 RX ADMIN — LEVOTHYROXINE SODIUM 112 MCG: 0.11 TABLET ORAL at 04:28

## 2021-05-20 RX ADMIN — ZIPRASIDONE HYDROCHLORIDE 60 MG: 60 CAPSULE ORAL at 08:21

## 2021-05-20 RX ADMIN — ZIPRASIDONE HYDROCHLORIDE 60 MG: 60 CAPSULE ORAL at 20:46

## 2021-05-20 ASSESSMENT — PAIN DESCRIPTION - PAIN TYPE: TYPE: CHRONIC PAIN

## 2021-05-20 NOTE — DISCHARGE PLANNING
@[id number]  Agency/Facility Name: [facility]  Spoke To: Jorgito  Outcome: Will have supervisor review referral on Monday.    @[id number]  Agency/Facility Name: [facility]  Outcome: Left message, awaiting call back.

## 2021-05-20 NOTE — CARE PLAN
The patient is Stable - Low risk of patient condition declining or worsening    Shift Goals  Clinical Goals: be safe  Patient Goals: rest, no pain.    Progress made toward(s) clinical / shift goals:  be safe, repositioning, denies pain.

## 2021-05-20 NOTE — DISCHARGE PLANNING
Agency/Facility Name: [facility]  Spoke To: Jorgito  Outcome: Requesting updated notes, sent at 0836.

## 2021-05-20 NOTE — CARE PLAN
Problem: Knowledge Deficit - Standard  Goal: Patient and family/care givers will demonstrate understanding of plan of care, disease process/condition, diagnostic tests and medications  Outcome: Progressing     Problem: Depression  Goal: Patient and family/caregiver will verbalize accurate information about at least two of the possible causes of depression, three-four of the signs and symptoms of depression  Outcome: Progressing     Problem: Provide Safe Environment  Goal: Suicide environmental safety, protocols, policies, and practices will be implemented  Outcome: Progressing

## 2021-05-20 NOTE — DISCHARGE PLANNING
Anticipated Discharge Disposition:   Inpatient Psych    Action:   Discussed discharge planning needs. Per MD, pt is medically cleared for discharge to inpatient psych when accepted. DPA to follow up.    Received Legal Hold document from [facility], placed in pt's physical chart.    Barriers to Discharge:   Inpatient psych acceptance and bed availability.    Plan:   Hospital Care Management will continue to follow and assist with discharge planning needs.

## 2021-05-21 PROCEDURE — 770001 HCHG ROOM/CARE - MED/SURG/GYN PRIV*

## 2021-05-21 PROCEDURE — A9270 NON-COVERED ITEM OR SERVICE: HCPCS | Performed by: STUDENT IN AN ORGANIZED HEALTH CARE EDUCATION/TRAINING PROGRAM

## 2021-05-21 PROCEDURE — 700102 HCHG RX REV CODE 250 W/ 637 OVERRIDE(OP): Performed by: STUDENT IN AN ORGANIZED HEALTH CARE EDUCATION/TRAINING PROGRAM

## 2021-05-21 PROCEDURE — A9270 NON-COVERED ITEM OR SERVICE: HCPCS | Performed by: NURSE PRACTITIONER

## 2021-05-21 PROCEDURE — 700102 HCHG RX REV CODE 250 W/ 637 OVERRIDE(OP): Performed by: NURSE PRACTITIONER

## 2021-05-21 PROCEDURE — 700111 HCHG RX REV CODE 636 W/ 250 OVERRIDE (IP): Performed by: HOSPITALIST

## 2021-05-21 PROCEDURE — 700102 HCHG RX REV CODE 250 W/ 637 OVERRIDE(OP): Performed by: HOSPITALIST

## 2021-05-21 PROCEDURE — 99231 SBSQ HOSP IP/OBS SF/LOW 25: CPT | Performed by: STUDENT IN AN ORGANIZED HEALTH CARE EDUCATION/TRAINING PROGRAM

## 2021-05-21 PROCEDURE — A9270 NON-COVERED ITEM OR SERVICE: HCPCS | Performed by: HOSPITALIST

## 2021-05-21 RX ADMIN — METOPROLOL SUCCINATE 12.5 MG: 25 TABLET, EXTENDED RELEASE ORAL at 04:33

## 2021-05-21 RX ADMIN — CLONAZEPAM 1 MG: 1 TABLET ORAL at 17:33

## 2021-05-21 RX ADMIN — POTASSIUM CHLORIDE 20 MEQ: 20 TABLET, EXTENDED RELEASE ORAL at 04:34

## 2021-05-21 RX ADMIN — PAROXETINE HYDROCHLORIDE 20 MG: 20 TABLET, FILM COATED ORAL at 07:40

## 2021-05-21 RX ADMIN — HYDROCORTISONE 10 MG: 10 TABLET ORAL at 04:33

## 2021-05-21 RX ADMIN — ATORVASTATIN CALCIUM 40 MG: 40 TABLET, FILM COATED ORAL at 17:33

## 2021-05-21 RX ADMIN — FLUDROCORTISONE ACETATE 0.1 MG: 0.1 TABLET ORAL at 04:34

## 2021-05-21 RX ADMIN — ONDANSETRON 4 MG: 4 TABLET, ORALLY DISINTEGRATING ORAL at 22:20

## 2021-05-21 RX ADMIN — DOCUSATE SODIUM 50 MG AND SENNOSIDES 8.6 MG 2 TABLET: 8.6; 5 TABLET, FILM COATED ORAL at 04:35

## 2021-05-21 RX ADMIN — CLONAZEPAM 0.5 MG: 0.5 TABLET ORAL at 07:40

## 2021-05-21 RX ADMIN — ZIPRASIDONE HYDROCHLORIDE 60 MG: 60 CAPSULE ORAL at 19:23

## 2021-05-21 RX ADMIN — ACETAMINOPHEN 650 MG: 325 TABLET, FILM COATED ORAL at 21:10

## 2021-05-21 RX ADMIN — HYDROCORTISONE 5 MG: 10 TABLET ORAL at 15:07

## 2021-05-21 RX ADMIN — ZIPRASIDONE HYDROCHLORIDE 60 MG: 60 CAPSULE ORAL at 07:41

## 2021-05-21 RX ADMIN — APIXABAN 5 MG: 5 TABLET, FILM COATED ORAL at 19:23

## 2021-05-21 RX ADMIN — SIMETHICONE 80 MG: 80 TABLET, CHEWABLE ORAL at 23:56

## 2021-05-21 RX ADMIN — DIGOXIN 125 MCG: 125 TABLET ORAL at 17:33

## 2021-05-21 RX ADMIN — LEVOTHYROXINE SODIUM 112 MCG: 0.11 TABLET ORAL at 04:35

## 2021-05-21 RX ADMIN — APIXABAN 5 MG: 5 TABLET, FILM COATED ORAL at 07:40

## 2021-05-21 ASSESSMENT — ENCOUNTER SYMPTOMS
ABDOMINAL PAIN: 0
VOMITING: 0
ROS GI COMMENTS: COLOSTOMY IN PLACE
FEVER: 0
NAUSEA: 0
CONSTIPATION: 0
SHORTNESS OF BREATH: 0
NERVOUS/ANXIOUS: 0
COUGH: 0
DEPRESSION: 0
WEAKNESS: 0
DIARRHEA: 0

## 2021-05-21 NOTE — CARE PLAN
Problem: Knowledge Deficit - Standard  Goal: Patient and family/care givers will demonstrate understanding of plan of care, disease process/condition, diagnostic tests and medications  Outcome: Not Progressing  Pt is unwilling to engage is self care.      Problem: Psychosocial  Goal: Patient's ability to identify and develop effective coping behaviors will improve  Outcome:Not Progressing  Pt is unwilling to engage is self care.

## 2021-05-21 NOTE — PROGRESS NOTES
Hospital Medicine Twice Weekly Progress Note    Date of Service  5/21/2021      Chief Complaint  Suicidal ideation    Hospital Course  Mr. Castro is a 56 year-old man with a past medical history significant for atrial fibrillation, secondary hypercoagulable state, diabetes mellitus complicated by diabetic neuropathy, chronic systolic CHF, colostomy placement, orthostatic hypotension (maintained on florinef), and dyslipidemia who presented to the ED on 2/9/2021 after he became dizzy and had a syncopal episode at home.  In the ED he was noted to be hypotensive and bradycardic. Cardiology was consulted and recommended stopping all AV sanket blocking medications.  He remained orthostatic during his hospitalization so midodrine was started and then transit to fludrocortisone bid. BP stabilized after.  An echocardiogram was obtained and showed mild mitral regurgitation and an EF of 60% which was improved from his previous echocardiogram obtained on 11/2020 (previously 35%).  CTAs of the head and neck were done and were unremarkable outside of distal bilateral ICA tortuosity.  Also during his hospitalization he expressed desire to kill himself. Psychiatry consulted, medication adjusted and legal hold placed.   He has a colostomy and has become overwhelmed by caring for it. He is now pending [facility] placement at the 6-month db for maturity of his colostomy. And he is medically cleared for psychaittric inpatient treatment.    Interval Problem Update  I evaluated and examined the patient at the bedside.  Labs and imaging were reviewed. Care plan was discussed with the patient and bedside nurse.    Discussed with patient's primary surgeon, Dr. Desouza, he is currently out of town and will reevaluate the patient on 5/24 with repeat CMP & prealbumin to check patient's nutrition status for colostomy reversal.   Psychiatry re-evaluated the patient on 5/15 and extended the legal hold which was restarted on 5/10. Tele sitter.      Patient is doing well with colostomy care.     Still has suicide ideation, denies plan    Pending placement to inpatient psych facility    Consultants/Specialty  Cardiology  Psychiatry  General surgery    Code Status  Full Code    Disposition  Placement to inpatient psych facility    Review of Systems  Review of Systems   Constitutional: Negative for fever and malaise/fatigue.   Respiratory: Negative for cough and shortness of breath.    Cardiovascular: Negative for chest pain and leg swelling.   Gastrointestinal: Negative for abdominal pain, constipation, diarrhea, nausea and vomiting.        Colostomy in place    Neurological: Negative for weakness.   Psychiatric/Behavioral: Negative for depression and suicidal ideas. The patient is not nervous/anxious.    All other systems reviewed and are negative.     Physical Exam  Temp:  [36.1 °C (97 °F)-37 °C (98.6 °F)] 37 °C (98.6 °F)  Pulse:  [72-91] 73  Resp:  [16-18] 18  BP: (117-141)/(64-80) 117/64  SpO2:  [93 %-95 %] 94 %    Physical Exam  Vitals and nursing note reviewed.   Constitutional:       General: He is awake. He is not in acute distress.     Appearance: Normal appearance. He is well-developed. He is not ill-appearing.   HENT:      Head: Normocephalic and atraumatic.      Mouth/Throat:      Lips: Pink.      Mouth: Mucous membranes are moist.   Eyes:      General: Lids are normal.      Conjunctiva/sclera: Conjunctivae normal.      Pupils: Pupils are equal, round, and reactive to light.   Cardiovascular:      Rate and Rhythm: Normal rate and regular rhythm.      Pulses: Normal pulses.      Heart sounds: Normal heart sounds.   Pulmonary:      Effort: Pulmonary effort is normal. No respiratory distress.      Breath sounds: Normal breath sounds. No decreased breath sounds or wheezing.   Abdominal:      General: Bowel sounds are normal. There is no distension.      Palpations: Abdomen is soft.      Tenderness: There is no abdominal tenderness. There is no  guarding.       Musculoskeletal:      Cervical back: Neck supple.      Right lower leg: No edema.      Left lower leg: No edema.   Skin:     General: Skin is warm and dry.      Coloration: Skin is pale.   Neurological:      General: No focal deficit present.      Mental Status: He is alert and oriented to person, place, and time.      Cranial Nerves: No cranial nerve deficit.   Psychiatric:         Attention and Perception: Attention and perception normal.         Mood and Affect: Affect normal. Mood is depressed.         Speech: Speech normal.         Behavior: Behavior normal. Behavior is cooperative.         Thought Content: Thought content does not include suicidal ideation. Thought content does not include suicidal plan.         Cognition and Memory: Memory normal.         Fluids    Intake/Output Summary (Last 24 hours) at 5/21/2021 1004  Last data filed at 5/21/2021 0700  Gross per 24 hour   Intake 500 ml   Output 1450 ml   Net -950 ml     Laboratory          Imaging  [id number] VIEW   Final Result         No significant interval change.      DX-CHEST-LIMITED (1 VIEW)   Final Result         Diffuse interstitial prominence could relate to mild pulmonary edema or atypical infection      [id number] VIEW   Final Result      Increased colonic gas without definite bowel obstruction.      US-RENAL   Final Result      No evidence of hydronephrosis.      Lobulated kidneys bilaterally.      CT-ABDOMEN-PELVIS WITH   Final Result      1.  There is no evidence of small bowel obstruction.   2.  There is a left lower quadrant ostomy.   3.  There is fluid distention of the colon distal to the surgical material in the left mid descending colon extending all the way to the rectum. There is no pneumatosis or free air.   4.  There is cholelithiasis without biliary dilatation.   5.  There has been interval removal of the drainage catheter anterior to the spleen with minimal hypodense area in the anterior spleen again noted.  "There is no new fluid collection.      IR-US GUIDED PIV   Final Result    Ultrasound-guided PERIPHERAL IV INSERTION performed by    qualified nursing staff as above.      EC-ECHOCARDIOGRAM COMPLETE W/O CONT   Final Result      DX-LUMBAR SPINE-2 OR 3 VIEWS   Final Result      Moderate compression deformity of T11 is new compared to 2018.      Mild wedge deformity of T12 is unchanged.      Degenerative changes including facet arthropathy.      Mild retrolisthesis of L5 on S1 and L3 on L4.            Assessment/Plan  * Suicidal ideation- (present on admission)  Assessment & Plan  -Has history of MDD, recurrent with psychotic features. active SI w/ plan on admission  -Psychiatry following, recommended discontinuation of civil commitment - suspect possible malingering or secondary gain   - Evaluated bedside again and discussed with patient who states he \"wants to live\"   -Psych recommending sitter to ensure patient does not attempt actions that would extend his stay   5/5: Patient reports that he is telling staff that his suicidal ideations because he would like attention and not because he is actually having suicidal ideations.  5/10: placed on legal hold.  Patient is still on legal hold , pending placement to inpatient psych facility    Orthostatic hypotension- (present on admission)  Assessment & Plan  -Multifactorial including adrenal insufficiency, medications (beta blocker, CCB, tamsulosin), diabetic autonomic dysfunction, and venous insufficiency.  -EF is noted to be 60%, significant improvement from prior (previously 35%).  -Per cardiology, no further cardiac work-up needed.  Recommends follow-up with them as outpatient.  -Fall precautions.  -Continue to educate on the importance of slow positional changes.   - Started PO hydrocortisone 4/7/2021 for AI. Continuing florinef.   -Decreased toprolol which allowed for discontinuation of midodrine  Improved     Paroxysmal atrial fibrillation (HCC)- (present on " admission)  Assessment & Plan  -CHADSVASC 3 (HTN, CHF, DM).   -TTE Feb 2021: EF 60%.  -Continue metoprolol, digoxin, and eliquis.    Chronic heart failure with preserved ejection fraction (HCC)- (present on admission)  Assessment & Plan  -Now stable.  -Pulmonary edema noted in mid-March, was placed on IV diuresis though that was held when he developed a gram-negative gina bacteremia and sepsis.  Currently doing well from a respiratory standpoint, no shortness of breath, lungs clear to auscultation bilaterally, not requiring oxygen supplementation.  -Repeat echocardiogram showed improvement in his ejection fraction to 60%.    -Continue metoprolol succinate.  -Lisinopril held per cardiology recommendations due to hypotension.    Diabetes mellitus type 2, insulin dependent (HCC)- (present on admission)  Assessment & Plan  -A1c 5.2% on 2/14/2021.  -Continue diabetic diet.  -Continue lantus at current dose   -Hypoglycemia protocol in place if needed.      Major depressive disorder, recurrent, severe with psychotic features (HCC)- (present on admission)  Assessment & Plan  -Management as listed under 'anxiety and depression'.    Anxiety and depression- (present on admission)  Assessment & Plan  -Legal hold, awaiting acceptance at psych facility ([facility] will take in May).  -Continue paxil, klonopin & geodon.     Hypothyroidism- (present on admission)  Assessment & Plan  -TFTs in Feb were WNL. Continue levothyroxine at current dose.     Adrenal insufficiency (HCC)- (present on admission)  Assessment & Plan  -Started PO hydrocortisone 4/7/2021. Continue florinef.   -Needs outpatient endocrinology follow up.  -Monitor potassium while on florinef. 4.4 on 4/7/2021.  -Repeat orthostatics periodically if symptomatic         Obstructive uropathy- (present on admission)  Assessment & Plan  -Trial off of flomax given his hypotension.  Discontinued 4/6/2021. No retention continue off Flomax    Chronic venous insufficiency of lower  extremity- (present on admission)  Assessment & Plan  -Continue compression stockings.    Colostomy present (HCC)- (present on admission)  Assessment & Plan  -History of fall at home Nov 2020 on left side with perforated colon and splenic fluid collection/hematoma s/p segmental colectomy, colostomy, mobilization of the splenic flexure, wound vac, and I&D Nov 2020.  -Wound care consulted for ostomy care, but he was non-participatory. He is now engaging in ostomy care with his bedside RNs, encouraged to be observant but do it independently.  -Patient must be independently able to care for his ostomy before inpatient psychiatry will accept him.  5/14: Discussed with patient's primary surgeon, Dr. Desouza, he is currently out of town and will reevaluate the patient on 5/24 with repeat CMP in prealbumin to check patient nutrition status for colostomy reversal.  Stated that information to the patient this morning.    Debility- (present on admission)  Assessment & Plan  -Cleared by PT/OT.  -Out of bed for each meal and ambulate in the hallways at least twice per shift.    Mixed hyperlipidemia- (present on admission)  Assessment & Plan  Lab Results   Component Value Date/Time    CHOLSTRLTOT 146 02/14/2021 01:30 AM    LDL 82 02/14/2021 01:30 AM    HDL 32 (A) 02/14/2021 01:30 AM    TRIGLYCERIDE 160 (H) 02/14/2021 01:30 AM   -Continue atorvastatin.     Lower limb amputation, great toe (HCC)- (present on admission)  Assessment & Plan  -History of, 2018.  Site well-healed.    Class 2 severe obesity with serious comorbidity and body mass index (BMI) of 35.0 to 35.9 in adult (HCC)- (present on admission)  Assessment & Plan  Body mass index is 35.19 kg/m².  -Could benefit from outpatient weight loss counseling which can be arranged through his PCP after hospital discharge.       VTE prophylaxis: Eliquis

## 2021-05-21 NOTE — PROGRESS NOTES
Hospital Medicine Twice Weekly Progress Note    Date of Service  5/21/2021      Chief Complaint  Suicidal ideation    Hospital Course  Mr. Castro is a 56 year-old man with a past medical history significant for atrial fibrillation, secondary hypercoagulable state, diabetes mellitus complicated by diabetic neuropathy, chronic systolic CHF, colostomy placement, orthostatic hypotension (maintained on florinef), and dyslipidemia who presented to the ED on 2/9/2021 after he became dizzy and had a syncopal episode at home.  In the ED he was noted to be hypotensive and bradycardic. Cardiology was consulted and recommended stopping all AV sanket blocking medications.  He remained orthostatic during his hospitalization so midodrine was started and then transit to fludrocortisone bid. BP stabilized after.  An echocardiogram was obtained and showed mild mitral regurgitation and an EF of 60% which was improved from his previous echocardiogram obtained on 11/2020 (previously 35%).  CTAs of the head and neck were done and were unremarkable outside of distal bilateral ICA tortuosity.  Also during his hospitalization he expressed desire to kill himself. Psychiatry consulted, medication adjusted and legal hold placed.   He has a colostomy and has become overwhelmed by caring for it. He is now pending [facility] placement at the 6-month db for maturity of his colostomy. And he is medically cleared for psychaittric inpatient treatment.    Interval Problem Update  I evaluated and examined the patient at the bedside.  Labs and imaging were reviewed. Care plan was discussed with the patient and bedside nurse.    Discussed with patient's primary surgeon, Dr. Desouza, he is currently out of town and will reevaluate the patient on 5/24 with repeat CMP & prealbumin to check patient's nutrition status for colostomy reversal.   Psychiatry re-evaluated the patient on 5/15 and extended the legal hold which was restarted on 5/10. Tele sitter.      Patient is doing well with colostomy care.     Still has suicide ideation, denies plan; feel better    Pending placement to inpatient psych facility    Consultants/Specialty  Cardiology  Psychiatry  General surgery    Code Status  Full Code    Disposition  Placement to inpatient psych facility    Review of Systems  Review of Systems   Constitutional: Negative for fever and malaise/fatigue.   Respiratory: Negative for cough and shortness of breath.    Cardiovascular: Negative for chest pain and leg swelling.   Gastrointestinal: Negative for abdominal pain, constipation, diarrhea, nausea and vomiting.        Colostomy in place    Neurological: Negative for weakness.   Psychiatric/Behavioral: Negative for depression and suicidal ideas. The patient is not nervous/anxious.    All other systems reviewed and are negative.     Physical Exam  Temp:  [36.1 °C (97 °F)-37 °C (98.6 °F)] 37 °C (98.6 °F)  Pulse:  [72-91] 73  Resp:  [16-18] 18  BP: (117-141)/(64-80) 117/64  SpO2:  [93 %-95 %] 94 %    Physical Exam  Vitals and nursing note reviewed.   Constitutional:       General: He is awake. He is not in acute distress.     Appearance: Normal appearance. He is well-developed. He is not ill-appearing.   HENT:      Head: Normocephalic and atraumatic.      Mouth/Throat:      Lips: Pink.      Mouth: Mucous membranes are moist.   Eyes:      General: Lids are normal.      Conjunctiva/sclera: Conjunctivae normal.      Pupils: Pupils are equal, round, and reactive to light.   Cardiovascular:      Rate and Rhythm: Normal rate and regular rhythm.      Pulses: Normal pulses.      Heart sounds: Normal heart sounds.   Pulmonary:      Effort: Pulmonary effort is normal. No respiratory distress.      Breath sounds: Normal breath sounds. No decreased breath sounds or wheezing.   Abdominal:      General: Bowel sounds are normal. There is no distension.      Palpations: Abdomen is soft.      Tenderness: There is no abdominal tenderness. There  is no guarding.       Musculoskeletal:      Cervical back: Neck supple.      Right lower leg: No edema.      Left lower leg: No edema.   Skin:     General: Skin is warm and dry.      Coloration: Skin is pale.   Neurological:      General: No focal deficit present.      Mental Status: He is alert and oriented to person, place, and time.      Cranial Nerves: No cranial nerve deficit.   Psychiatric:         Attention and Perception: Attention and perception normal.         Mood and Affect: Affect normal. Mood is depressed.         Speech: Speech normal.         Behavior: Behavior normal. Behavior is cooperative.         Thought Content: Thought content does not include suicidal ideation. Thought content does not include suicidal plan.         Cognition and Memory: Memory normal.         Fluids    Intake/Output Summary (Last 24 hours) at 5/21/2021 1004  Last data filed at 5/21/2021 0700  Gross per 24 hour   Intake 500 ml   Output 1450 ml   Net -950 ml     Laboratory          Imaging  [id number] VIEW   Final Result         No significant interval change.      DX-CHEST-LIMITED (1 VIEW)   Final Result         Diffuse interstitial prominence could relate to mild pulmonary edema or atypical infection      [id number] VIEW   Final Result      Increased colonic gas without definite bowel obstruction.      US-RENAL   Final Result      No evidence of hydronephrosis.      Lobulated kidneys bilaterally.      CT-ABDOMEN-PELVIS WITH   Final Result      1.  There is no evidence of small bowel obstruction.   2.  There is a left lower quadrant ostomy.   3.  There is fluid distention of the colon distal to the surgical material in the left mid descending colon extending all the way to the rectum. There is no pneumatosis or free air.   4.  There is cholelithiasis without biliary dilatation.   5.  There has been interval removal of the drainage catheter anterior to the spleen with minimal hypodense area in the anterior spleen again  "noted. There is no new fluid collection.      IR-US GUIDED PIV   Final Result    Ultrasound-guided PERIPHERAL IV INSERTION performed by    qualified nursing staff as above.      EC-ECHOCARDIOGRAM COMPLETE W/O CONT   Final Result      DX-LUMBAR SPINE-2 OR 3 VIEWS   Final Result      Moderate compression deformity of T11 is new compared to 2018.      Mild wedge deformity of T12 is unchanged.      Degenerative changes including facet arthropathy.      Mild retrolisthesis of L5 on S1 and L3 on L4.            Assessment/Plan  * Suicidal ideation- (present on admission)  Assessment & Plan  -Has history of MDD, recurrent with psychotic features. active SI w/ plan on admission  -Psychiatry following, recommended discontinuation of civil commitment - suspect possible malingering or secondary gain   - Evaluated bedside again and discussed with patient who states he \"wants to live\"   -Psych recommending sitter to ensure patient does not attempt actions that would extend his stay   5/5: Patient reports that he is telling staff that his suicidal ideations because he would like attention and not because he is actually having suicidal ideations.  5/10: placed on legal hold.  Patient is still on legal hold , pending placement to inpatient psych facility    Orthostatic hypotension- (present on admission)  Assessment & Plan  -Multifactorial including adrenal insufficiency, medications (beta blocker, CCB, tamsulosin), diabetic autonomic dysfunction, and venous insufficiency.  -EF is noted to be 60%, significant improvement from prior (previously 35%).  -Per cardiology, no further cardiac work-up needed.  Recommends follow-up with them as outpatient.  -Fall precautions.  -Continue to educate on the importance of slow positional changes.   - Started PO hydrocortisone 4/7/2021 for AI. Continuing florinef.   -Decreased toprolol which allowed for discontinuation of midodrine  Improved     Paroxysmal atrial fibrillation (HCC)- (present on " admission)  Assessment & Plan  -CHADSVASC 3 (HTN, CHF, DM).   -TTE Feb 2021: EF 60%.  -Continue metoprolol, digoxin, and eliquis.    Chronic heart failure with preserved ejection fraction (HCC)- (present on admission)  Assessment & Plan  -Now stable.  -Pulmonary edema noted in mid-March, was placed on IV diuresis though that was held when he developed a gram-negative gina bacteremia and sepsis.  Currently doing well from a respiratory standpoint, no shortness of breath, lungs clear to auscultation bilaterally, not requiring oxygen supplementation.  -Repeat echocardiogram showed improvement in his ejection fraction to 60%.    -Continue metoprolol succinate.  -Lisinopril held per cardiology recommendations due to hypotension.    Diabetes mellitus type 2, insulin dependent (HCC)- (present on admission)  Assessment & Plan  -A1c 5.2% on 2/14/2021.  -Continue diabetic diet.  -Continue lantus at current dose   -Hypoglycemia protocol in place if needed.      Major depressive disorder, recurrent, severe with psychotic features (HCC)- (present on admission)  Assessment & Plan  -Management as listed under 'anxiety and depression'.    Anxiety and depression- (present on admission)  Assessment & Plan  -Legal hold, awaiting acceptance at psych facility ([facility] will take in May).  -Continue paxil, klonopin & geodon.     Hypothyroidism- (present on admission)  Assessment & Plan  -TFTs in Feb were WNL. Continue levothyroxine at current dose.     Adrenal insufficiency (HCC)- (present on admission)  Assessment & Plan  -Started PO hydrocortisone 4/7/2021. Continue florinef.   -Needs outpatient endocrinology follow up.  -Monitor potassium while on florinef. 4.4 on 4/7/2021.  -Repeat orthostatics periodically if symptomatic         Obstructive uropathy- (present on admission)  Assessment & Plan  -Trial off of flomax given his hypotension.  Discontinued 4/6/2021. No retention continue off Flomax    Chronic venous insufficiency of lower  extremity- (present on admission)  Assessment & Plan  -Continue compression stockings.    Colostomy present (HCC)- (present on admission)  Assessment & Plan  -History of fall at home Nov 2020 on left side with perforated colon and splenic fluid collection/hematoma s/p segmental colectomy, colostomy, mobilization of the splenic flexure, wound vac, and I&D Nov 2020.  -Wound care consulted for ostomy care, but he was non-participatory. He is now engaging in ostomy care with his bedside RNs, encouraged to be observant but do it independently.  -Patient must be independently able to care for his ostomy before inpatient psychiatry will accept him.  5/14: Discussed with patient's primary surgeon, Dr. Desouza, he is currently out of town and will reevaluate the patient on 5/24 with repeat CMP in prealbumin to check patient nutrition status for colostomy reversal.  Stated that information to the patient this morning.    Debility- (present on admission)  Assessment & Plan  -Cleared by PT/OT.  -Out of bed for each meal and ambulate in the hallways at least twice per shift.    Mixed hyperlipidemia- (present on admission)  Assessment & Plan  Lab Results   Component Value Date/Time    CHOLSTRLTOT 146 02/14/2021 01:30 AM    LDL 82 02/14/2021 01:30 AM    HDL 32 (A) 02/14/2021 01:30 AM    TRIGLYCERIDE 160 (H) 02/14/2021 01:30 AM   -Continue atorvastatin.     Lower limb amputation, great toe (HCC)- (present on admission)  Assessment & Plan  -History of, 2018.  Site well-healed.    Class 2 severe obesity with serious comorbidity and body mass index (BMI) of 35.0 to 35.9 in adult (HCC)- (present on admission)  Assessment & Plan  Body mass index is 35.19 kg/m².  -Could benefit from outpatient weight loss counseling which can be arranged through his PCP after hospital discharge.       VTE prophylaxis: Eliquis

## 2021-05-22 ENCOUNTER — APPOINTMENT (OUTPATIENT)
Dept: RADIOLOGY | Facility: MEDICAL CENTER | Age: 56
DRG: 981 | End: 2021-05-22
Attending: STUDENT IN AN ORGANIZED HEALTH CARE EDUCATION/TRAINING PROGRAM
Payer: MEDICAID

## 2021-05-22 LAB — GLUCOSE BLD-MCNC: 152 MG/DL (ref 65–99)

## 2021-05-22 PROCEDURE — 74018 RADEX ABDOMEN 1 VIEW: CPT

## 2021-05-22 PROCEDURE — 770001 HCHG ROOM/CARE - MED/SURG/GYN PRIV*

## 2021-05-22 PROCEDURE — 700102 HCHG RX REV CODE 250 W/ 637 OVERRIDE(OP): Performed by: STUDENT IN AN ORGANIZED HEALTH CARE EDUCATION/TRAINING PROGRAM

## 2021-05-22 PROCEDURE — 99232 SBSQ HOSP IP/OBS MODERATE 35: CPT | Performed by: STUDENT IN AN ORGANIZED HEALTH CARE EDUCATION/TRAINING PROGRAM

## 2021-05-22 PROCEDURE — A9270 NON-COVERED ITEM OR SERVICE: HCPCS | Performed by: STUDENT IN AN ORGANIZED HEALTH CARE EDUCATION/TRAINING PROGRAM

## 2021-05-22 PROCEDURE — 700111 HCHG RX REV CODE 636 W/ 250 OVERRIDE (IP): Performed by: STUDENT IN AN ORGANIZED HEALTH CARE EDUCATION/TRAINING PROGRAM

## 2021-05-22 PROCEDURE — 90832 PSYTX W PT 30 MINUTES: CPT | Performed by: PSYCHOLOGIST

## 2021-05-22 PROCEDURE — 700111 HCHG RX REV CODE 636 W/ 250 OVERRIDE (IP): Performed by: HOSPITALIST

## 2021-05-22 PROCEDURE — A9270 NON-COVERED ITEM OR SERVICE: HCPCS | Performed by: HOSPITALIST

## 2021-05-22 PROCEDURE — 700102 HCHG RX REV CODE 250 W/ 637 OVERRIDE(OP): Performed by: HOSPITALIST

## 2021-05-22 PROCEDURE — 82962 GLUCOSE BLOOD TEST: CPT

## 2021-05-22 RX ORDER — ENALAPRILAT 1.25 MG/ML
1.25 INJECTION INTRAVENOUS EVERY 6 HOURS PRN
Status: DISCONTINUED | OUTPATIENT
Start: 2021-05-22 | End: 2021-05-22

## 2021-05-22 RX ORDER — CHLORTHALIDONE 25 MG/1
25 TABLET ORAL
Status: DISCONTINUED | OUTPATIENT
Start: 2021-05-22 | End: 2021-05-28

## 2021-05-22 RX ORDER — ONDANSETRON 2 MG/ML
4 INJECTION INTRAMUSCULAR; INTRAVENOUS ONCE
Status: COMPLETED | OUTPATIENT
Start: 2021-05-22 | End: 2021-05-22

## 2021-05-22 RX ORDER — METOCLOPRAMIDE HYDROCHLORIDE 5 MG/ML
10 INJECTION INTRAMUSCULAR; INTRAVENOUS EVERY 4 HOURS PRN
Status: DISCONTINUED | OUTPATIENT
Start: 2021-05-22 | End: 2021-06-10

## 2021-05-22 RX ORDER — METOCLOPRAMIDE HYDROCHLORIDE 5 MG/ML
10 INJECTION INTRAMUSCULAR; INTRAVENOUS EVERY 4 HOURS PRN
Status: DISCONTINUED | OUTPATIENT
Start: 2021-05-22 | End: 2021-05-22

## 2021-05-22 RX ORDER — ONDANSETRON 2 MG/ML
4 INJECTION INTRAMUSCULAR; INTRAVENOUS EVERY 4 HOURS PRN
Status: DISCONTINUED | OUTPATIENT
Start: 2021-05-22 | End: 2021-07-09 | Stop reason: HOSPADM

## 2021-05-22 RX ORDER — LABETALOL HYDROCHLORIDE 5 MG/ML
10 INJECTION, SOLUTION INTRAVENOUS EVERY 6 HOURS PRN
Status: DISCONTINUED | OUTPATIENT
Start: 2021-05-22 | End: 2021-06-15

## 2021-05-22 RX ORDER — KETOROLAC TROMETHAMINE 30 MG/ML
30 INJECTION, SOLUTION INTRAMUSCULAR; INTRAVENOUS ONCE
Status: COMPLETED | OUTPATIENT
Start: 2021-05-22 | End: 2021-05-22

## 2021-05-22 RX ADMIN — LORAZEPAM 0.5 MG: 1 TABLET ORAL at 02:23

## 2021-05-22 RX ADMIN — CLONAZEPAM 0.5 MG: 0.5 TABLET ORAL at 08:31

## 2021-05-22 RX ADMIN — ONDANSETRON 4 MG: 4 TABLET, ORALLY DISINTEGRATING ORAL at 06:23

## 2021-05-22 RX ADMIN — ONDANSETRON 4 MG: 2 INJECTION INTRAMUSCULAR; INTRAVENOUS at 19:35

## 2021-05-22 RX ADMIN — METOCLOPRAMIDE 10 MG: 5 INJECTION, SOLUTION INTRAMUSCULAR; INTRAVENOUS at 12:45

## 2021-05-22 RX ADMIN — METOCLOPRAMIDE 10 MG: 5 INJECTION, SOLUTION INTRAMUSCULAR; INTRAVENOUS at 17:27

## 2021-05-22 RX ADMIN — PAROXETINE HYDROCHLORIDE 20 MG: 20 TABLET, FILM COATED ORAL at 08:31

## 2021-05-22 RX ADMIN — ENALAPRILAT 1.25 MG: 1.25 INJECTION INTRAVENOUS at 10:06

## 2021-05-22 RX ADMIN — ZIPRASIDONE HYDROCHLORIDE 60 MG: 60 CAPSULE ORAL at 19:36

## 2021-05-22 RX ADMIN — ZIPRASIDONE HYDROCHLORIDE 60 MG: 60 CAPSULE ORAL at 08:30

## 2021-05-22 RX ADMIN — METOPROLOL SUCCINATE 12.5 MG: 25 TABLET, EXTENDED RELEASE ORAL at 04:37

## 2021-05-22 RX ADMIN — KETOROLAC TROMETHAMINE 30 MG: 30 INJECTION, SOLUTION INTRAMUSCULAR; INTRAVENOUS at 04:11

## 2021-05-22 RX ADMIN — SIMETHICONE 80 MG: 80 TABLET, CHEWABLE ORAL at 06:23

## 2021-05-22 RX ADMIN — CLONAZEPAM 1 MG: 1 TABLET ORAL at 18:25

## 2021-05-22 RX ADMIN — DIGOXIN 125 MCG: 125 TABLET ORAL at 18:24

## 2021-05-22 RX ADMIN — APIXABAN 5 MG: 5 TABLET, FILM COATED ORAL at 08:31

## 2021-05-22 RX ADMIN — ONDANSETRON 4 MG: 2 INJECTION INTRAMUSCULAR; INTRAVENOUS at 03:04

## 2021-05-22 RX ADMIN — ONDANSETRON 4 MG: 2 INJECTION INTRAMUSCULAR; INTRAVENOUS at 10:06

## 2021-05-22 RX ADMIN — APIXABAN 5 MG: 5 TABLET, FILM COATED ORAL at 19:36

## 2021-05-22 RX ADMIN — ATORVASTATIN CALCIUM 40 MG: 40 TABLET, FILM COATED ORAL at 18:24

## 2021-05-22 RX ADMIN — ONDANSETRON 4 MG: 4 TABLET, ORALLY DISINTEGRATING ORAL at 02:45

## 2021-05-22 ASSESSMENT — ENCOUNTER SYMPTOMS
SHORTNESS OF BREATH: 0
NAUSEA: 0
VOMITING: 0
ABDOMINAL PAIN: 0
CONSTIPATION: 0
FEVER: 0
DEPRESSION: 0
WEAKNESS: 0
NERVOUS/ANXIOUS: 0
ROS GI COMMENTS: COLOSTOMY IN PLACE
DIARRHEA: 0
COUGH: 0

## 2021-05-22 ASSESSMENT — PAIN DESCRIPTION - PAIN TYPE
TYPE: ACUTE PAIN
TYPE: ACUTE PAIN

## 2021-05-22 NOTE — PROGRESS NOTES
Surgery    Pt scheduled for elective colostomy reversal on May 27th, 2021 at 130 PM    I am out of town, but will see pt Tuesday morning to discuss procedure and order bowel prep.    Will need appropriate preop medical workup done and documented before Wednesday the 26th.      Patient's suicidal ideation, though attributed to his colostomy, may not improve after reversal procedure. Would like opinion of Psychiatry/psychology on chart prior to procedure as well.

## 2021-05-22 NOTE — CONSULTS
BRIEF PSYCHOLOGY NOTE: Patient seen earlier today. Please refer to that note for more information regarding the session. Per chart review, the patient is now scheduled for an elective colostomy reversal on 5/27/21.     This writer has seen the patient at least two times a week since 2/21/21. He has consistently identified his colostomy as one of the primary reasons for the increase in his suicidality. There have been times before when he thought his colostomy might be reversed and his mood and suicidality have improved as a result.     While this writer cannot definitely predict that reversal of the patient's colostomy will completely resolve his suicidality, it is her clinical opinion that it will aid in improving his mood and decreasing his suicidality so that he can be stabilized enough by this writer for outpatient mental health treatment. Even if this does not occur, outpatient psychiatric hospitals will be more likely to accept the patient if he does not have a colostomy.    Plan:     -Will plan to see patient and evaluate him again on Monday, 5/24/21.

## 2021-05-22 NOTE — CARE PLAN
The patient is Stable - Low risk of patient condition declining or worsening    Shift Goals  Clinical Goals: no self half or others  Patient Goals: rest, reposition    Progress made toward(s) clinical / shift goals:  pt cheerful and smiling     Patient is not progressing towards the following goals:

## 2021-05-22 NOTE — CONSULTS
"PSYCHOLOGICAL FOLLOW-UP:  Reason for admission: Syncope and collapse [R55]  Suicidal ideation [R45.851]  Sepsis due to Klebsiella pneumoniae (HCC) [A41.4]  Length of Visit: 16min    Legal status: Legal Status: Involuntary    Chief Complaint: \"not good\"    HPI: Met with the patient for a risk assessment and crisis intervention. Patient presented with a tired affect and reported a \"not good\" mood. He reported that he was up all night with stomach pain and throwing up. He did have a bag of green throw up on his tray table next to him. Due to feeling sick, he started experiencing suicidal thoughts again which is typically what he experiences when he feels physically sick. Reminded the patient to stay aware of the origin of the thoughts and how to \"not listen to them.\" The patient did report excitement about talking to the surgeon on Monday about reversing his ostomy.     Psychiatric Examination:  Vitals: Blood pressure (!) 166/98, pulse (!) 115, temperature 36 °C (96.8 °F), temperature source Temporal, resp. rate 20, height 1.829 m (6'), weight 116 kg (256 lb 13.4 oz), SpO2 93 %.  Musculoskeletal: normal psychomotor activity, no tics or unusual mannerisms noted  Appearance and Eye Contact: appropriate dress and grooming. Behavior is calm, cooperative,  appropriate eye contact  Attention/Alertness: Alert  Thought Process: Linear, Logical and Goal Directed    Thought Content: SI  Speech: Clear with normal rate and rhythm  Mood: \"not good\"            Affect: tired         SI/HI: Endorses    Memory: Recent and remote memory appear somewhat intact   Orientation: alert, oriented to person, place and time  Insight into symptoms: fair  Judgement into symptoms:fair    ASSESSMENT: No changes since last assessment other than what is stated above. Still suicidal and now more so because he does not feel well. Will continue to monitor. Legal hold remains extended.     DSM5 Diagnostic Considerations:   Unspecified Personality " Disorder  Major Depressive Disorder, recurrent, severe, with psychotic features      PLAN:  Legal status: Involuntary  Anticipate F/U by a qualified mental health professional within 72 hours     Tele-sitter with q15 checks. Upgrade to 1:1 sitter if he engages in any self-harming or suicidal behavior or gesture.      Limit conversations with the patient to only as needed interactions as his suicidality can worsen with perceived negative interactions with staff.      Patient is allowed to have access to:     [employer]     TV with his call light. Please immediately remove if he appears to be engaging in any self-harm/suicidal behavior.      Legal hold restrictions as follows:     -Strip room of anything he can hurt himself with including all cords (except call light.) Do not leave anything in there that he could use to stick inside his body.     -Finger foods only     -Supervise ostomy bag changes and remove all supplies once he is done     -Place gloves on the patient or consider restraints if he attempts to pull the ostomy out and/or hurts himself in anyway      -Do not allow the patient to walk around the unit as he could grab something to hurt himself with while on that walk.      -please continue to refer patient to inpatient psychiatric hospitals      Records reviewed: yes  Discussed patient with other provider: yes, team psychiatrist   Will continue to follow  Thank you for the consult.  Madhuri Fernandez, Ph.D.

## 2021-05-22 NOTE — PROGRESS NOTES
Hospital Medicine Twice Weekly Progress Note    Date of Service  5/22/2021      Chief Complaint  Suicidal ideation    Hospital Course  Mr. Castro is a 56 year-old man with a past medical history significant for atrial fibrillation, secondary hypercoagulable state, diabetes mellitus complicated by diabetic neuropathy, chronic systolic CHF, colostomy placement, orthostatic hypotension (maintained on florinef), and dyslipidemia who presented to the ED on 2/9/2021 after he became dizzy and had a syncopal episode at home.  In the ED he was noted to be hypotensive and bradycardic. Cardiology was consulted and recommended stopping all AV sanket blocking medications.  He remained orthostatic during his hospitalization so midodrine was started and then transit to fludrocortisone bid. BP stabilized after.  An echocardiogram was obtained and showed mild mitral regurgitation and an EF of 60% which was improved from his previous echocardiogram obtained on 11/2020 (previously 35%).  CTAs of the head and neck were done and were unremarkable outside of distal bilateral ICA tortuosity.  Also during his hospitalization he expressed desire to kill himself. Psychiatry consulted, medication adjusted and legal hold placed.   He has a colostomy and has become overwhelmed by caring for it. He is now pending [facility] placement at the 6-month db for maturity of his colostomy. And he is medically cleared for psychaittric inpatient treatment.    Interval Problem Update  I evaluated and examined the patient at the bedside.  Labs and imaging were reviewed. Care plan was discussed with the patient and bedside nurse.    Discussed with patient's primary surgeon, Dr. Desouza, he is currently out of town and will reevaluate the patient on 5/24 with repeat CMP & prealbumin to check patient's nutrition status for colostomy reversal.   Psychiatry re-evaluated the patient on 5/15 and extended the legal hold which was restarted on 5/10. Tele sitter.      Patient is doing well with colostomy care.     Still has suicide ideation, denies plan; feel better    N/V overnight, xr to rule out obstruction, denies pain,  Change to clear liquid    Pending placement to inpatient psych facility    Consultants/Specialty  Cardiology  Psychiatry  General surgery    Code Status  Full Code    Disposition  Placement to inpatient psych facility    Review of Systems  Review of Systems   Constitutional: Negative for fever and malaise/fatigue.   Respiratory: Negative for cough and shortness of breath.    Cardiovascular: Negative for chest pain and leg swelling.   Gastrointestinal: Negative for abdominal pain, constipation, diarrhea, nausea and vomiting.        Colostomy in place    Neurological: Negative for weakness.   Psychiatric/Behavioral: Negative for depression and suicidal ideas. The patient is not nervous/anxious.    All other systems reviewed and are negative.     Physical Exam  Temp:  [36.1 °C (97 °F)-37.1 °C (98.7 °F)] 36.1 °C (97 °F)  Pulse:  [] 96  Resp:  [18] 18  BP: (117-161)/(64-94) 161/94  SpO2:  [90 %-96 %] 90 %    Physical Exam  Vitals and nursing note reviewed.   Constitutional:       General: He is awake. He is not in acute distress.     Appearance: Normal appearance. He is well-developed. He is not ill-appearing.   HENT:      Head: Normocephalic and atraumatic.      Mouth/Throat:      Lips: Pink.      Mouth: Mucous membranes are moist.   Eyes:      General: Lids are normal.      Conjunctiva/sclera: Conjunctivae normal.      Pupils: Pupils are equal, round, and reactive to light.   Cardiovascular:      Rate and Rhythm: Normal rate and regular rhythm.      Pulses: Normal pulses.      Heart sounds: Normal heart sounds.   Pulmonary:      Effort: Pulmonary effort is normal. No respiratory distress.      Breath sounds: Normal breath sounds. No decreased breath sounds or wheezing.   Abdominal:      General: Bowel sounds are normal. There is no distension.       Palpations: Abdomen is soft.      Tenderness: There is no abdominal tenderness. There is no guarding.       Musculoskeletal:      Cervical back: Neck supple.      Right lower leg: No edema.      Left lower leg: No edema.   Skin:     General: Skin is warm and dry.      Coloration: Skin is pale.   Neurological:      General: No focal deficit present.      Mental Status: He is alert and oriented to person, place, and time.      Cranial Nerves: No cranial nerve deficit.   Psychiatric:         Attention and Perception: Attention and perception normal.         Mood and Affect: Affect normal. Mood is depressed.         Speech: Speech normal.         Behavior: Behavior normal. Behavior is cooperative.         Thought Content: Thought content does not include suicidal ideation. Thought content does not include suicidal plan.         Cognition and Memory: Memory normal.         Fluids    Intake/Output Summary (Last 24 hours) at 5/22/2021 0731  Last data filed at 5/22/2021 0000  Gross per 24 hour   Intake 1360 ml   Output 600 ml   Net 760 ml     Laboratory          Imaging  [id number] VIEW   Final Result         No significant interval change.      DX-CHEST-LIMITED (1 VIEW)   Final Result         Diffuse interstitial prominence could relate to mild pulmonary edema or atypical infection      [id number] VIEW   Final Result      Increased colonic gas without definite bowel obstruction.      US-RENAL   Final Result      No evidence of hydronephrosis.      Lobulated kidneys bilaterally.      CT-ABDOMEN-PELVIS WITH   Final Result      1.  There is no evidence of small bowel obstruction.   2.  There is a left lower quadrant ostomy.   3.  There is fluid distention of the colon distal to the surgical material in the left mid descending colon extending all the way to the rectum. There is no pneumatosis or free air.   4.  There is cholelithiasis without biliary dilatation.   5.  There has been interval removal of the drainage  "catheter anterior to the spleen with minimal hypodense area in the anterior spleen again noted. There is no new fluid collection.      IR-US GUIDED PIV   Final Result    Ultrasound-guided PERIPHERAL IV INSERTION performed by    qualified nursing staff as above.      EC-ECHOCARDIOGRAM COMPLETE W/O CONT   Final Result      DX-LUMBAR SPINE-2 OR 3 VIEWS   Final Result      Moderate compression deformity of T11 is new compared to 2018.      Mild wedge deformity of T12 is unchanged.      Degenerative changes including facet arthropathy.      Mild retrolisthesis of L5 on S1 and L3 on L4.            Assessment/Plan  * Suicidal ideation- (present on admission)  Assessment & Plan  -Has history of MDD, recurrent with psychotic features. active SI w/ plan on admission  -Psychiatry following, recommended discontinuation of civil commitment - suspect possible malingering or secondary gain   - Evaluated bedside again and discussed with patient who states he \"wants to live\"   -Psych recommending sitter to ensure patient does not attempt actions that would extend his stay   5/5: Patient reports that he is telling staff that his suicidal ideations because he would like attention and not because he is actually having suicidal ideations.  5/10: placed on legal hold.  Patient is still on legal hold , pending placement to inpatient psych facility    Orthostatic hypotension- (present on admission)  Assessment & Plan  -Multifactorial including adrenal insufficiency, medications (beta blocker, CCB, tamsulosin), diabetic autonomic dysfunction, and venous insufficiency.  -EF is noted to be 60%, significant improvement from prior (previously 35%).  -Per cardiology, no further cardiac work-up needed.  Recommends follow-up with them as outpatient.  -Fall precautions.  -Continue to educate on the importance of slow positional changes.   - Started PO hydrocortisone 4/7/2021 for AI. Continuing florinef.   -Decreased toprolol which allowed for " discontinuation of midodrine  Improved     Paroxysmal atrial fibrillation (HCC)- (present on admission)  Assessment & Plan  -CHADSVASC 3 (HTN, CHF, DM).   -TTE Feb 2021: EF 60%.  -Continue metoprolol, digoxin, and eliquis.    Chronic heart failure with preserved ejection fraction (HCC)- (present on admission)  Assessment & Plan  -Now stable.  -Pulmonary edema noted in mid-March, was placed on IV diuresis though that was held when he developed a gram-negative gina bacteremia and sepsis.  Currently doing well from a respiratory standpoint, no shortness of breath, lungs clear to auscultation bilaterally, not requiring oxygen supplementation.  -Repeat echocardiogram showed improvement in his ejection fraction to 60%.    -Continue metoprolol succinate.  -Lisinopril held per cardiology recommendations due to hypotension.    Diabetes mellitus type 2, insulin dependent (HCC)- (present on admission)  Assessment & Plan  -A1c 5.2% on 2/14/2021.  -Continue diabetic diet.  -Continue lantus at current dose   -Hypoglycemia protocol in place if needed.      Major depressive disorder, recurrent, severe with psychotic features (HCC)- (present on admission)  Assessment & Plan  -Management as listed under 'anxiety and depression'.    Anxiety and depression- (present on admission)  Assessment & Plan  -Legal hold, awaiting acceptance at psych facility ([facility] will take in May).  -Continue paxil, klonopin & geodon.     Hypothyroidism- (present on admission)  Assessment & Plan  -TFTs in Feb were WNL. Continue levothyroxine at current dose.     Adrenal insufficiency (HCC)- (present on admission)  Assessment & Plan  -Started PO hydrocortisone 4/7/2021. Continue florinef.   -Needs outpatient endocrinology follow up.  -Monitor potassium while on florinef. 4.4 on 4/7/2021.  -Repeat orthostatics periodically if symptomatic         Obstructive uropathy- (present on admission)  Assessment & Plan  -Trial off of flomax given his hypotension.   Discontinued 4/6/2021. No retention continue off Flomax    Chronic venous insufficiency of lower extremity- (present on admission)  Assessment & Plan  -Continue compression stockings.    Colostomy present (HCC)- (present on admission)  Assessment & Plan  -History of fall at home Nov 2020 on left side with perforated colon and splenic fluid collection/hematoma s/p segmental colectomy, colostomy, mobilization of the splenic flexure, wound vac, and I&D Nov 2020.  -Wound care consulted for ostomy care, but he was non-participatory. He is now engaging in ostomy care with his bedside RNs, encouraged to be observant but do it independently.  -Patient must be independently able to care for his ostomy before inpatient psychiatry will accept him.  5/14: Discussed with patient's primary surgeon, Dr. Desouza, he is currently out of town and will reevaluate the patient on 5/24 with repeat CMP in prealbumin to check patient nutrition status for colostomy reversal.  Stated that information to the patient this morning.    Debility- (present on admission)  Assessment & Plan  -Cleared by PT/OT.  -Out of bed for each meal and ambulate in the hallways at least twice per shift.    Mixed hyperlipidemia- (present on admission)  Assessment & Plan  Lab Results   Component Value Date/Time    CHOLSTRLTOT 146 02/14/2021 01:30 AM    LDL 82 02/14/2021 01:30 AM    HDL 32 (A) 02/14/2021 01:30 AM    TRIGLYCERIDE 160 (H) 02/14/2021 01:30 AM   -Continue atorvastatin.     Lower limb amputation, great toe (HCC)- (present on admission)  Assessment & Plan  -History of, 2018.  Site well-healed.    Class 2 severe obesity with serious comorbidity and body mass index (BMI) of 35.0 to 35.9 in adult (HCC)- (present on admission)  Assessment & Plan  Body mass index is 35.19 kg/m².  -Could benefit from outpatient weight loss counseling which can be arranged through his PCP after hospital discharge.       VTE prophylaxis: Eliquis

## 2021-05-23 LAB
ANION GAP SERPL CALC-SCNC: 10 MMOL/L (ref 7–16)
BUN SERPL-MCNC: 29 MG/DL (ref 8–22)
CALCIUM SERPL-MCNC: 9.7 MG/DL (ref 8.5–10.5)
CHLORIDE SERPL-SCNC: 101 MMOL/L (ref 96–112)
CO2 SERPL-SCNC: 30 MMOL/L (ref 20–33)
CREAT SERPL-MCNC: 0.89 MG/DL (ref 0.5–1.4)
ERYTHROCYTE [DISTWIDTH] IN BLOOD BY AUTOMATED COUNT: 47.3 FL (ref 35.9–50)
GLUCOSE SERPL-MCNC: 114 MG/DL (ref 65–99)
HCT VFR BLD AUTO: 47 % (ref 42–52)
HGB BLD-MCNC: 14.9 G/DL (ref 14–18)
MCH RBC QN AUTO: 28.7 PG (ref 27–33)
MCHC RBC AUTO-ENTMCNC: 31.7 G/DL (ref 33.7–35.3)
MCV RBC AUTO: 90.4 FL (ref 81.4–97.8)
PLATELET # BLD AUTO: 221 K/UL (ref 164–446)
PMV BLD AUTO: 11 FL (ref 9–12.9)
POTASSIUM SERPL-SCNC: 4 MMOL/L (ref 3.6–5.5)
RBC # BLD AUTO: 5.2 M/UL (ref 4.7–6.1)
SODIUM SERPL-SCNC: 141 MMOL/L (ref 135–145)
WBC # BLD AUTO: 12.6 K/UL (ref 4.8–10.8)

## 2021-05-23 PROCEDURE — 36415 COLL VENOUS BLD VENIPUNCTURE: CPT

## 2021-05-23 PROCEDURE — 85027 COMPLETE CBC AUTOMATED: CPT

## 2021-05-23 PROCEDURE — 80048 BASIC METABOLIC PNL TOTAL CA: CPT

## 2021-05-23 PROCEDURE — A9270 NON-COVERED ITEM OR SERVICE: HCPCS | Performed by: HOSPITALIST

## 2021-05-23 PROCEDURE — 770001 HCHG ROOM/CARE - MED/SURG/GYN PRIV*

## 2021-05-23 PROCEDURE — 700102 HCHG RX REV CODE 250 W/ 637 OVERRIDE(OP): Performed by: NURSE PRACTITIONER

## 2021-05-23 PROCEDURE — 700102 HCHG RX REV CODE 250 W/ 637 OVERRIDE(OP): Performed by: STUDENT IN AN ORGANIZED HEALTH CARE EDUCATION/TRAINING PROGRAM

## 2021-05-23 PROCEDURE — A9270 NON-COVERED ITEM OR SERVICE: HCPCS | Performed by: STUDENT IN AN ORGANIZED HEALTH CARE EDUCATION/TRAINING PROGRAM

## 2021-05-23 PROCEDURE — A9270 NON-COVERED ITEM OR SERVICE: HCPCS | Performed by: NURSE PRACTITIONER

## 2021-05-23 PROCEDURE — 700102 HCHG RX REV CODE 250 W/ 637 OVERRIDE(OP): Performed by: HOSPITALIST

## 2021-05-23 PROCEDURE — 99232 SBSQ HOSP IP/OBS MODERATE 35: CPT | Performed by: STUDENT IN AN ORGANIZED HEALTH CARE EDUCATION/TRAINING PROGRAM

## 2021-05-23 RX ADMIN — DOCUSATE SODIUM 50 MG AND SENNOSIDES 8.6 MG 2 TABLET: 8.6; 5 TABLET, FILM COATED ORAL at 04:38

## 2021-05-23 RX ADMIN — METOPROLOL SUCCINATE 12.5 MG: 25 TABLET, EXTENDED RELEASE ORAL at 04:40

## 2021-05-23 RX ADMIN — DIGOXIN 125 MCG: 125 TABLET ORAL at 17:52

## 2021-05-23 RX ADMIN — CLONAZEPAM 0.5 MG: 0.5 TABLET ORAL at 08:34

## 2021-05-23 RX ADMIN — APIXABAN 5 MG: 5 TABLET, FILM COATED ORAL at 21:34

## 2021-05-23 RX ADMIN — CHLORTHALIDONE 25 MG: 25 TABLET ORAL at 04:38

## 2021-05-23 RX ADMIN — ZIPRASIDONE HYDROCHLORIDE 60 MG: 60 CAPSULE ORAL at 21:56

## 2021-05-23 RX ADMIN — CLONAZEPAM 1 MG: 1 TABLET ORAL at 17:52

## 2021-05-23 RX ADMIN — PAROXETINE HYDROCHLORIDE 20 MG: 20 TABLET, FILM COATED ORAL at 08:34

## 2021-05-23 RX ADMIN — HYDROCORTISONE 5 MG: 10 TABLET ORAL at 15:16

## 2021-05-23 RX ADMIN — FLUDROCORTISONE ACETATE 0.1 MG: 0.1 TABLET ORAL at 04:38

## 2021-05-23 RX ADMIN — ATORVASTATIN CALCIUM 40 MG: 40 TABLET, FILM COATED ORAL at 17:52

## 2021-05-23 RX ADMIN — ZIPRASIDONE HYDROCHLORIDE 60 MG: 60 CAPSULE ORAL at 10:24

## 2021-05-23 RX ADMIN — POTASSIUM CHLORIDE 20 MEQ: 20 TABLET, EXTENDED RELEASE ORAL at 04:37

## 2021-05-23 RX ADMIN — APIXABAN 5 MG: 5 TABLET, FILM COATED ORAL at 08:34

## 2021-05-23 RX ADMIN — LEVOTHYROXINE SODIUM 112 MCG: 0.11 TABLET ORAL at 04:38

## 2021-05-23 RX ADMIN — HYDROCORTISONE 10 MG: 10 TABLET ORAL at 04:38

## 2021-05-23 ASSESSMENT — ENCOUNTER SYMPTOMS
ABDOMINAL PAIN: 0
COUGH: 0
ROS GI COMMENTS: COLOSTOMY IN PLACE
DIARRHEA: 0
VOMITING: 0
FEVER: 0
CONSTIPATION: 0
NERVOUS/ANXIOUS: 0
NAUSEA: 0
WEAKNESS: 0
DEPRESSION: 0
SHORTNESS OF BREATH: 0

## 2021-05-23 ASSESSMENT — PATIENT HEALTH QUESTIONNAIRE - PHQ9
6. FEELING BAD ABOUT YOURSELF - OR THAT YOU ARE A FAILURE OR HAVE LET YOURSELF OR YOUR FAMILY DOWN: NOT AL ALL
4. FEELING TIRED OR HAVING LITTLE ENERGY: NOT AT ALL
9. THOUGHTS THAT YOU WOULD BE BETTER OFF DEAD, OR OF HURTING YOURSELF: NOT AT ALL
7. TROUBLE CONCENTRATING ON THINGS, SUCH AS READING THE NEWSPAPER OR WATCHING TELEVISION: NOT AT ALL
5. POOR APPETITE OR OVEREATING: NOT AT ALL
3. TROUBLE FALLING OR STAYING ASLEEP OR SLEEPING TOO MUCH: NOT AT ALL
SUM OF ALL RESPONSES TO PHQ QUESTIONS 1-9: 0
SUM OF ALL RESPONSES TO PHQ9 QUESTIONS 1 AND 2: 0
8. MOVING OR SPEAKING SO SLOWLY THAT OTHER PEOPLE COULD HAVE NOTICED. OR THE OPPOSITE, BEING SO FIGETY OR RESTLESS THAT YOU HAVE BEEN MOVING AROUND A LOT MORE THAN USUAL: NOT AT ALL
1. LITTLE INTEREST OR PLEASURE IN DOING THINGS: NOT AT ALL
2. FEELING DOWN, DEPRESSED, IRRITABLE, OR HOPELESS: NOT AT ALL

## 2021-05-23 NOTE — CARE PLAN
The patient is Stable - Low risk of patient condition declining or worsening    Shift Goals  Clinical Goals: no self half or others  Patient Goals: rest, reposition    Progress made toward(s) clinical / shift goals: Pt stated that the cramping in his abdomen has stopped and he is no longer vomiting.     Patient is not progressing towards the following goals:

## 2021-05-23 NOTE — CARE PLAN
The patient is Stable - Low risk of patient condition declining or worsening      Problem: Knowledge Deficit - Standard  Goal: Patient and family/care givers will demonstrate understanding of plan of care, disease process/condition, diagnostic tests and medications  Outcome: Progressing     Problem: Depression  Goal: Patient and family/caregiver will verbalize accurate information about at least two of the possible causes of depression, three-four of the signs and symptoms of depression  Outcome: Progressing     Problem: Provide Safe Environment  Goal: Suicide environmental safety, protocols, policies, and practices will be implemented  Outcome: Progressing     Problem: Psychosocial  Goal: Patient's ability to identify and develop effective coping behaviors will improve  Outcome: Progressing  Goal: Patient's ability to identify and utilize available support systems will improve  Outcome: Progressing     Problem: Hemodynamics  Goal: Patient's hemodynamics, fluid balance and neurologic status will be stable or improve  Outcome: Progressing     Problem: Fluid Volume  Goal: Fluid volume balance will be maintained  Outcome: Progressing     Problem: Urinary - Renal Perfusion  Goal: Ability to achieve and maintain adequate renal perfusion and functioning will improve  Outcome: Progressing     Problem: Respiratory  Goal: Patient will achieve/maintain optimum respiratory ventilation and gas exchange  Outcome: Progressing     Problem: Physical Regulation  Goal: Diagnostic test results will improve  Outcome: Progressing  Goal: Signs and symptoms of infection will decrease  Outcome: Progressing     Problem: Bowel Elimination  Goal: Establish and maintain regular bowel function  Outcome: Progressing       Shift Goals  Clinical Goals: no self half or others  Patient Goals: rest, reposition    Progress made toward(s) clinical / shift goals:     Patient is not progressing towards the following goals:

## 2021-05-23 NOTE — PROGRESS NOTES
Hospital Medicine Twice Weekly Progress Note    Date of Service  5/23/2021      Chief Complaint  Suicidal ideation    Hospital Course  Mr. Castro is a 56 year-old man with a past medical history significant for atrial fibrillation, secondary hypercoagulable state, diabetes mellitus complicated by diabetic neuropathy, chronic systolic CHF, colostomy placement, orthostatic hypotension (maintained on florinef), and dyslipidemia who presented to the ED on 2/9/2021 after he became dizzy and had a syncopal episode at home.  In the ED he was noted to be hypotensive and bradycardic. Cardiology was consulted and recommended stopping all AV sanket blocking medications.  He remained orthostatic during his hospitalization so midodrine was started and then transit to fludrocortisone bid. BP stabilized after.  An echocardiogram was obtained and showed mild mitral regurgitation and an EF of 60% which was improved from his previous echocardiogram obtained on 11/2020 (previously 35%).  CTAs of the head and neck were done and were unremarkable outside of distal bilateral ICA tortuosity.  Also during his hospitalization he expressed desire to kill himself. Psychiatry consulted, medication adjusted and legal hold placed.   He has a colostomy and has become overwhelmed by caring for it. He is now pending [facility] placement at the 6-month db for maturity of his colostomy. And he is medically cleared for psychaittric inpatient treatment.    Interval Problem Update  I evaluated and examined the patient at the bedside.  Labs and imaging were reviewed. Care plan was discussed with the patient and bedside nurse.    Discussed with patient's primary surgeon, Dr. Desouza reevaluated the patient on 5/22 with repeat CMP & prealbumin to check patient's nutrition status for colostomy reversal.   Pt scheduled for elective colostomy reversal on May 27th, 2021 at 130 PM. Would need appropriate preop medical workup done and documented before Wednesday  the 26th., eg: EKG, cardiology clearance, coagulant, nurtirion status, bowel prep, etc      Psychiatry re-evaluated the patient on 5/15 and extended the legal hold which was restarted on 5/10. Tele sitter. Remove anything that can be harmful, finger food only    Patient is doing well with colostomy care.     Still has suicide ideation, denies plan; feel better     xr  ruled out obstruction, denies pain, n/v; change to DM diet   happy to hear surgery scheduled on Thurday      Pending placement to inpatient psych facility    Consultants/Specialty  Cardiology  Psychiatry  General surgery    Code Status  Full Code    Disposition  Placement to inpatient psych facility    Review of Systems  Review of Systems   Constitutional: Negative for fever and malaise/fatigue.   Respiratory: Negative for cough and shortness of breath.    Cardiovascular: Negative for chest pain and leg swelling.   Gastrointestinal: Negative for abdominal pain, constipation, diarrhea, nausea and vomiting.        Colostomy in place    Neurological: Negative for weakness.   Psychiatric/Behavioral: Negative for depression and suicidal ideas. The patient is not nervous/anxious.    All other systems reviewed and are negative.     Physical Exam  Temp:  [36 °C (96.8 °F)-36.4 °C (97.6 °F)] 36.1 °C (97 °F)  Pulse:  [] 92  Resp:  [18-20] 18  BP: (115-181)/() 115/60  SpO2:  [90 %-93 %] 92 %    Physical Exam  Vitals and nursing note reviewed.   Constitutional:       General: He is awake. He is not in acute distress.     Appearance: Normal appearance. He is well-developed. He is not ill-appearing.   HENT:      Head: Normocephalic and atraumatic.      Mouth/Throat:      Lips: Pink.      Mouth: Mucous membranes are moist.   Eyes:      General: Lids are normal.      Conjunctiva/sclera: Conjunctivae normal.      Pupils: Pupils are equal, round, and reactive to light.   Cardiovascular:      Rate and Rhythm: Normal rate and regular rhythm.      Pulses: Normal  pulses.      Heart sounds: Normal heart sounds.   Pulmonary:      Effort: Pulmonary effort is normal. No respiratory distress.      Breath sounds: Normal breath sounds. No decreased breath sounds or wheezing.   Abdominal:      General: Bowel sounds are normal. There is no distension.      Palpations: Abdomen is soft.      Tenderness: There is no abdominal tenderness. There is no guarding.       Musculoskeletal:      Cervical back: Neck supple.      Right lower leg: No edema.      Left lower leg: No edema.   Skin:     General: Skin is warm and dry.      Coloration: Skin is pale.   Neurological:      General: No focal deficit present.      Mental Status: He is alert and oriented to person, place, and time.      Cranial Nerves: No cranial nerve deficit.   Psychiatric:         Attention and Perception: Attention and perception normal.         Mood and Affect: Affect normal. Mood is depressed.         Speech: Speech normal.         Behavior: Behavior normal. Behavior is cooperative.         Thought Content: Thought content does not include suicidal ideation. Thought content does not include suicidal plan.         Cognition and Memory: Memory normal.         Fluids    Intake/Output Summary (Last 24 hours) at 5/23/2021 0717  Last data filed at 5/23/2021 0500  Gross per 24 hour   Intake 480 ml   Output 1400 ml   Net -920 ml     Laboratory  Recent Labs     05/23/21  0355   WBC 12.6*   RBC 5.20   HEMOGLOBIN 14.9   HEMATOCRIT 47.0   MCV 90.4   MCH 28.7   MCHC 31.7*   RDW 47.3   PLATELETCT 221   MPV 11.0     Recent Labs     05/23/21  0355   SODIUM 141   POTASSIUM 4.0   CHLORIDE 101   CO2 30   GLUCOSE 114*   BUN 29*   CREATININE 0.89   CALCIUM 9.7     Imaging  [id number] VIEW   Final Result         No specific finding to suggest small bowel obstruction.      [id number] VIEW   Final Result         No significant interval change.      DX-CHEST-LIMITED (1 VIEW)   Final Result         Diffuse interstitial prominence could relate  "to mild pulmonary edema or atypical infection      [id number] VIEW   Final Result      Increased colonic gas without definite bowel obstruction.      US-RENAL   Final Result      No evidence of hydronephrosis.      Lobulated kidneys bilaterally.      CT-ABDOMEN-PELVIS WITH   Final Result      1.  There is no evidence of small bowel obstruction.   2.  There is a left lower quadrant ostomy.   3.  There is fluid distention of the colon distal to the surgical material in the left mid descending colon extending all the way to the rectum. There is no pneumatosis or free air.   4.  There is cholelithiasis without biliary dilatation.   5.  There has been interval removal of the drainage catheter anterior to the spleen with minimal hypodense area in the anterior spleen again noted. There is no new fluid collection.      IR-US GUIDED PIV   Final Result    Ultrasound-guided PERIPHERAL IV INSERTION performed by    qualified nursing staff as above.      EC-ECHOCARDIOGRAM COMPLETE W/O CONT   Final Result      DX-LUMBAR SPINE-2 OR 3 VIEWS   Final Result      Moderate compression deformity of T11 is new compared to 2018.      Mild wedge deformity of T12 is unchanged.      Degenerative changes including facet arthropathy.      Mild retrolisthesis of L5 on S1 and L3 on L4.            Assessment/Plan  * Suicidal ideation- (present on admission)  Assessment & Plan  -Has history of MDD, recurrent with psychotic features. active SI w/ plan on admission  -Psychiatry following, recommended discontinuation of civil commitment - suspect possible malingering or secondary gain   - Evaluated bedside again and discussed with patient who states he \"wants to live\"   -Psych recommending sitter to ensure patient does not attempt actions that would extend his stay   5/5: Patient reports that he is telling staff that his suicidal ideations because he would like attention and not because he is actually having suicidal ideations.  5/10: placed on " legal hold.  Patient is still on legal hold , pending placement to inpatient psych facility    Orthostatic hypotension- (present on admission)  Assessment & Plan  -Multifactorial including adrenal insufficiency, medications (beta blocker, CCB, tamsulosin), diabetic autonomic dysfunction, and venous insufficiency.  -EF is noted to be 60%, significant improvement from prior (previously 35%).  -Per cardiology, no further cardiac work-up needed.  Recommends follow-up with them as outpatient.  -Fall precautions.  -Continue to educate on the importance of slow positional changes.   - Started PO hydrocortisone 4/7/2021 for AI. Continuing florinef.   -Decreased toprolol which allowed for discontinuation of midodrine  Improved     Paroxysmal atrial fibrillation (HCC)- (present on admission)  Assessment & Plan  -CHADSVASC 3 (HTN, CHF, DM).   -TTE Feb 2021: EF 60%.  -Continue metoprolol, digoxin, and eliquis.    Chronic heart failure with preserved ejection fraction (HCC)- (present on admission)  Assessment & Plan  -Now stable.  -Pulmonary edema noted in mid-March, was placed on IV diuresis though that was held when he developed a gram-negative gina bacteremia and sepsis.  Currently doing well from a respiratory standpoint, no shortness of breath, lungs clear to auscultation bilaterally, not requiring oxygen supplementation.  -Repeat echocardiogram showed improvement in his ejection fraction to 60%.    -Continue metoprolol succinate.  -Lisinopril held per cardiology recommendations due to hypotension.    Diabetes mellitus type 2, insulin dependent (HCC)- (present on admission)  Assessment & Plan  -A1c 5.2% on 2/14/2021.  -Continue diabetic diet.  -Continue lantus at current dose   -Hypoglycemia protocol in place if needed.      Major depressive disorder, recurrent, severe with psychotic features (HCC)- (present on admission)  Assessment & Plan  -Management as listed under 'anxiety and depression'.    Anxiety and depression-  (present on admission)  Assessment & Plan  -Legal hold, awaiting acceptance at psych facility ([facility] will take in May).  -Continue paxil, klonopin & geodon.     Hypothyroidism- (present on admission)  Assessment & Plan  -TFTs in Feb were WNL. Continue levothyroxine at current dose.     Adrenal insufficiency (HCC)- (present on admission)  Assessment & Plan  -Started PO hydrocortisone 4/7/2021. Continue florinef.   -Needs outpatient endocrinology follow up.  -Monitor potassium while on florinef. 4.4 on 4/7/2021.  -Repeat orthostatics periodically if symptomatic         Obstructive uropathy- (present on admission)  Assessment & Plan  -Trial off of flomax given his hypotension.  Discontinued 4/6/2021. No retention continue off Flomax    Chronic venous insufficiency of lower extremity- (present on admission)  Assessment & Plan  -Continue compression stockings.    Colostomy present (HCC)- (present on admission)  Assessment & Plan  -History of fall at home Nov 2020 on left side with perforated colon and splenic fluid collection/hematoma s/p segmental colectomy, colostomy, mobilization of the splenic flexure, wound vac, and I&D Nov 2020.  -Wound care consulted for ostomy care, but he was non-participatory. He is now engaging in ostomy care with his bedside RNs, encouraged to be observant but do it independently.  -Patient must be independently able to care for his ostomy before inpatient psychiatry will accept him.  5/14: Discussed with patient's primary surgeon, Dr. Desouza, he is currently out of town and will reevaluate the patient on 5/24 with repeat CMP in prealbumin to check patient nutrition status for colostomy reversal.  Stated that information to the patient this morning.    Debility- (present on admission)  Assessment & Plan  -Cleared by PT/OT.  -Out of bed for each meal and ambulate in the hallways at least twice per shift.    Mixed hyperlipidemia- (present on admission)  Assessment & Plan  Lab Results    Component Value Date/Time    CHOLSTRLTOT 146 02/14/2021 01:30 AM    LDL 82 02/14/2021 01:30 AM    HDL 32 (A) 02/14/2021 01:30 AM    TRIGLYCERIDE 160 (H) 02/14/2021 01:30 AM   -Continue atorvastatin.     Lower limb amputation, great toe (HCC)- (present on admission)  Assessment & Plan  -History of, 2018.  Site well-healed.    Class 2 severe obesity with serious comorbidity and body mass index (BMI) of 35.0 to 35.9 in adult (HCC)- (present on admission)  Assessment & Plan  Body mass index is 35.19 kg/m².  -Could benefit from outpatient weight loss counseling which can be arranged through his PCP after hospital discharge.       VTE prophylaxis: Eliquis

## 2021-05-23 NOTE — CARE PLAN
The patient is Watcher - Medium risk of patient condition declining or worsening    Shift Goals  Clinical Goals: no self half or others  Patient Goals: rest, reposition    Progress made toward(s) clinical / shift goals:  Patient safety prioritized.    Problem: Bowel Elimination  Goal: Establish and maintain regular bowel function  Outcome: Progressing   Ostomy functioning WNL    Patient is not progressing towards the following goals:

## 2021-05-24 PROCEDURE — A9270 NON-COVERED ITEM OR SERVICE: HCPCS | Performed by: NURSE PRACTITIONER

## 2021-05-24 PROCEDURE — 770001 HCHG ROOM/CARE - MED/SURG/GYN PRIV*

## 2021-05-24 PROCEDURE — A9270 NON-COVERED ITEM OR SERVICE: HCPCS | Performed by: STUDENT IN AN ORGANIZED HEALTH CARE EDUCATION/TRAINING PROGRAM

## 2021-05-24 PROCEDURE — 700102 HCHG RX REV CODE 250 W/ 637 OVERRIDE(OP): Performed by: STUDENT IN AN ORGANIZED HEALTH CARE EDUCATION/TRAINING PROGRAM

## 2021-05-24 PROCEDURE — 700102 HCHG RX REV CODE 250 W/ 637 OVERRIDE(OP): Performed by: NURSE PRACTITIONER

## 2021-05-24 PROCEDURE — A9270 NON-COVERED ITEM OR SERVICE: HCPCS | Performed by: HOSPITALIST

## 2021-05-24 PROCEDURE — 99232 SBSQ HOSP IP/OBS MODERATE 35: CPT | Performed by: STUDENT IN AN ORGANIZED HEALTH CARE EDUCATION/TRAINING PROGRAM

## 2021-05-24 PROCEDURE — 90832 PSYTX W PT 30 MINUTES: CPT | Performed by: PSYCHOLOGIST

## 2021-05-24 PROCEDURE — 700102 HCHG RX REV CODE 250 W/ 637 OVERRIDE(OP): Performed by: HOSPITALIST

## 2021-05-24 RX ADMIN — FLUDROCORTISONE ACETATE 0.1 MG: 0.1 TABLET ORAL at 05:45

## 2021-05-24 RX ADMIN — LEVOTHYROXINE SODIUM 112 MCG: 0.11 TABLET ORAL at 05:46

## 2021-05-24 RX ADMIN — HYDROCORTISONE 10 MG: 10 TABLET ORAL at 05:47

## 2021-05-24 RX ADMIN — CHLORTHALIDONE 25 MG: 25 TABLET ORAL at 05:45

## 2021-05-24 RX ADMIN — ATORVASTATIN CALCIUM 40 MG: 40 TABLET, FILM COATED ORAL at 17:36

## 2021-05-24 RX ADMIN — CLONAZEPAM 1 MG: 1 TABLET ORAL at 17:36

## 2021-05-24 RX ADMIN — CLONAZEPAM 0.5 MG: 0.5 TABLET ORAL at 08:50

## 2021-05-24 RX ADMIN — APIXABAN 5 MG: 5 TABLET, FILM COATED ORAL at 20:47

## 2021-05-24 RX ADMIN — METOPROLOL SUCCINATE 12.5 MG: 25 TABLET, EXTENDED RELEASE ORAL at 05:45

## 2021-05-24 RX ADMIN — PAROXETINE HYDROCHLORIDE 20 MG: 20 TABLET, FILM COATED ORAL at 08:51

## 2021-05-24 RX ADMIN — ZIPRASIDONE HYDROCHLORIDE 60 MG: 60 CAPSULE ORAL at 20:47

## 2021-05-24 RX ADMIN — APIXABAN 5 MG: 5 TABLET, FILM COATED ORAL at 08:50

## 2021-05-24 RX ADMIN — POTASSIUM CHLORIDE 20 MEQ: 20 TABLET, EXTENDED RELEASE ORAL at 05:46

## 2021-05-24 RX ADMIN — HYDROCORTISONE 5 MG: 10 TABLET ORAL at 16:29

## 2021-05-24 RX ADMIN — ZIPRASIDONE HYDROCHLORIDE 60 MG: 60 CAPSULE ORAL at 08:50

## 2021-05-24 RX ADMIN — DIGOXIN 125 MCG: 125 TABLET ORAL at 17:36

## 2021-05-24 ASSESSMENT — PAIN DESCRIPTION - PAIN TYPE
TYPE: ACUTE PAIN
TYPE: ACUTE PAIN

## 2021-05-24 ASSESSMENT — ENCOUNTER SYMPTOMS
NERVOUS/ANXIOUS: 0
CONSTIPATION: 0
VOMITING: 0
FEVER: 0
ROS GI COMMENTS: COLOSTOMY IN PLACE
SHORTNESS OF BREATH: 0
NAUSEA: 0
DEPRESSION: 0
WEAKNESS: 0
ABDOMINAL PAIN: 0
COUGH: 0
DIARRHEA: 0

## 2021-05-24 NOTE — PROGRESS NOTES
Hospital Medicine Twice Weekly Progress Note    Date of Service  5/24/2021      Chief Complaint  Suicidal ideation    Hospital Course  Mr. Castro is a 56 year-old man with a past medical history significant for atrial fibrillation, secondary hypercoagulable state, diabetes mellitus complicated by diabetic neuropathy, chronic systolic CHF, colostomy placement, orthostatic hypotension (maintained on florinef), and dyslipidemia who presented to the ED on 2/9/2021 after he became dizzy and had a syncopal episode at home.  In the ED he was noted to be hypotensive and bradycardic. Cardiology was consulted and recommended stopping all AV sanket blocking medications.  He remained orthostatic during his hospitalization so midodrine was started and then transit to fludrocortisone bid. BP stabilized after.  An echocardiogram was obtained and showed mild mitral regurgitation and an EF of 60% which was improved from his previous echocardiogram obtained on 11/2020 (previously 35%).  CTAs of the head and neck were done and were unremarkable outside of distal bilateral ICA tortuosity.  Also during his hospitalization he expressed desire to kill himself. Psychiatry consulted, medication adjusted and legal hold placed.   He has a colostomy and has become overwhelmed by caring for it. He is now pending [facility] placement at the 6-month db for maturity of his colostomy. And he is medically cleared for psychaittric inpatient treatment.    Interval Problem Update  I evaluated and examined the patient at the bedside.  Labs and imaging were reviewed. Care plan was discussed with the patient and bedside nurse.    Discussed with patient's primary surgeon, Dr. Desouza reevaluated the patient on 5/22 with repeat CMP & prealbumin to check patient's nutrition status for colostomy reversal.   Pt scheduled for elective colostomy reversal on May 27th, 2021 at 130 PM. Would need appropriate preop medical workup done and documented before Wednesday  the 26th., eg: EKG, cardiology clearance, coagulant, nurtirion status, bowel prep, etc      Psychiatry re-evaluated the patient on 5/15 and extended the legal hold which was restarted on 5/10. Tele sitter. Remove anything that can be harmful, finger food only; high risk of hurt himself    Patient is doing well with colostomy care.     Still has suicide ideation, denies plan; feel better     xr  ruled out obstruction, denies pain, n/v; change to DM diet   happy to hear surgery scheduled on Thurday      Pending placement to inpatient psych facility    Consultants/Specialty  Cardiology  Psychiatry  General surgery    Code Status  Full Code    Disposition  Placement to inpatient psych facility    Review of Systems  Review of Systems   Constitutional: Negative for fever and malaise/fatigue.   Respiratory: Negative for cough and shortness of breath.    Cardiovascular: Negative for chest pain and leg swelling.   Gastrointestinal: Negative for abdominal pain, constipation, diarrhea, nausea and vomiting.        Colostomy in place    Neurological: Negative for weakness.   Psychiatric/Behavioral: Negative for depression and suicidal ideas. The patient is not nervous/anxious.    All other systems reviewed and are negative.     Physical Exam  Temp:  [36.1 °C (96.9 °F)-36.4 °C (97.6 °F)] 36.4 °C (97.6 °F)  Pulse:  [63-80] 63  Resp:  [16-18] 18  BP: ()/(48-77) 110/67  SpO2:  [90 %-97 %] 97 %    Physical Exam  Vitals and nursing note reviewed.   Constitutional:       General: He is awake. He is not in acute distress.     Appearance: Normal appearance. He is well-developed. He is not ill-appearing.   HENT:      Head: Normocephalic and atraumatic.      Mouth/Throat:      Lips: Pink.      Mouth: Mucous membranes are moist.   Eyes:      General: Lids are normal.      Conjunctiva/sclera: Conjunctivae normal.      Pupils: Pupils are equal, round, and reactive to light.   Cardiovascular:      Rate and Rhythm: Normal rate and regular  rhythm.      Pulses: Normal pulses.      Heart sounds: Normal heart sounds.   Pulmonary:      Effort: Pulmonary effort is normal. No respiratory distress.      Breath sounds: Normal breath sounds. No decreased breath sounds or wheezing.   Abdominal:      General: Bowel sounds are normal. There is no distension.      Palpations: Abdomen is soft.      Tenderness: There is no abdominal tenderness. There is no guarding.       Musculoskeletal:      Cervical back: Neck supple.      Right lower leg: No edema.      Left lower leg: No edema.   Skin:     General: Skin is warm and dry.      Coloration: Skin is pale.   Neurological:      General: No focal deficit present.      Mental Status: He is alert and oriented to person, place, and time.      Cranial Nerves: No cranial nerve deficit.   Psychiatric:         Attention and Perception: Attention and perception normal.         Mood and Affect: Affect normal. Mood is depressed.         Speech: Speech normal.         Behavior: Behavior normal. Behavior is cooperative.         Thought Content: Thought content does not include suicidal ideation. Thought content does not include suicidal plan.         Cognition and Memory: Memory normal.         Fluids    Intake/Output Summary (Last 24 hours) at 5/24/2021 0734  Last data filed at 5/24/2021 0451  Gross per 24 hour   Intake 1700 ml   Output 1675 ml   Net 25 ml     Laboratory  Recent Labs     05/23/21  0355   WBC 12.6*   RBC 5.20   HEMOGLOBIN 14.9   HEMATOCRIT 47.0   MCV 90.4   MCH 28.7   MCHC 31.7*   RDW 47.3   PLATELETCT 221   MPV 11.0     Recent Labs     05/23/21  0355   SODIUM 141   POTASSIUM 4.0   CHLORIDE 101   CO2 30   GLUCOSE 114*   BUN 29*   CREATININE 0.89   CALCIUM 9.7     Imaging  OG-CWARJBL-4 VIEW   Final Result         No specific finding to suggest small bowel obstruction.      TI-FPMCJQA-6 VIEW   Final Result         No significant interval change.      DX-CHEST-LIMITED (1 VIEW)   Final Result         Diffuse  "interstitial prominence could relate to mild pulmonary edema or atypical infection      ZO-VPIBOYY-4 VIEW   Final Result      Increased colonic gas without definite bowel obstruction.      US-RENAL   Final Result      No evidence of hydronephrosis.      Lobulated kidneys bilaterally.      CT-ABDOMEN-PELVIS WITH   Final Result      1.  There is no evidence of small bowel obstruction.   2.  There is a left lower quadrant ostomy.   3.  There is fluid distention of the colon distal to the surgical material in the left mid descending colon extending all the way to the rectum. There is no pneumatosis or free air.   4.  There is cholelithiasis without biliary dilatation.   5.  There has been interval removal of the drainage catheter anterior to the spleen with minimal hypodense area in the anterior spleen again noted. There is no new fluid collection.      IR-US GUIDED PIV   Final Result    Ultrasound-guided PERIPHERAL IV INSERTION performed by    qualified nursing staff as above.      EC-ECHOCARDIOGRAM COMPLETE W/O CONT   Final Result      DX-LUMBAR SPINE-2 OR 3 VIEWS   Final Result      Moderate compression deformity of T11 is new compared to 2018.      Mild wedge deformity of T12 is unchanged.      Degenerative changes including facet arthropathy.      Mild retrolisthesis of L5 on S1 and L3 on L4.            Assessment/Plan  * Suicidal ideation- (present on admission)  Assessment & Plan  -Has history of MDD, recurrent with psychotic features. active SI w/ plan on admission  -Psychiatry following, recommended discontinuation of civil commitment - suspect possible malingering or secondary gain   - Evaluated bedside again and discussed with patient who states he \"wants to live\"   -Psych recommending sitter to ensure patient does not attempt actions that would extend his stay   5/5: Patient reports that he is telling staff that his suicidal ideations because he would like attention and not because he is actually having " suicidal ideations.  5/10: placed on legal hold.  Patient is still on legal hold , pending placement to inpatient psych facility    Orthostatic hypotension- (present on admission)  Assessment & Plan  -Multifactorial including adrenal insufficiency, medications (beta blocker, CCB, tamsulosin), diabetic autonomic dysfunction, and venous insufficiency.  -EF is noted to be 60%, significant improvement from prior (previously 35%).  -Per cardiology, no further cardiac work-up needed.  Recommends follow-up with them as outpatient.  -Fall precautions.  -Continue to educate on the importance of slow positional changes.   - Started PO hydrocortisone 4/7/2021 for AI. Continuing florinef.   -Decreased toprolol which allowed for discontinuation of midodrine  Improved     Paroxysmal atrial fibrillation (HCC)- (present on admission)  Assessment & Plan  -CHADSVASC 3 (HTN, CHF, DM).   -TTE Feb 2021: EF 60%.  -Continue metoprolol, digoxin, and eliquis.    Chronic heart failure with preserved ejection fraction (HCC)- (present on admission)  Assessment & Plan  -Now stable.  -Pulmonary edema noted in mid-March, was placed on IV diuresis though that was held when he developed a gram-negative gina bacteremia and sepsis.  Currently doing well from a respiratory standpoint, no shortness of breath, lungs clear to auscultation bilaterally, not requiring oxygen supplementation.  -Repeat echocardiogram showed improvement in his ejection fraction to 60%.    -Continue metoprolol succinate.  -Lisinopril held per cardiology recommendations due to hypotension.    Diabetes mellitus type 2, insulin dependent (HCC)- (present on admission)  Assessment & Plan  -A1c 5.2% on 2/14/2021.  -Continue diabetic diet.  -Continue lantus at current dose   -Hypoglycemia protocol in place if needed.      Major depressive disorder, recurrent, severe with psychotic features (HCC)- (present on admission)  Assessment & Plan  -Management as listed under 'anxiety and  depression'.    Anxiety and depression- (present on admission)  Assessment & Plan  -Legal hold, awaiting acceptance at psych facility ([facility] will take in May).  -Continue paxil, klonopin & geodon.     Hypothyroidism- (present on admission)  Assessment & Plan  -TFTs in Feb were WNL. Continue levothyroxine at current dose.     Adrenal insufficiency (HCC)- (present on admission)  Assessment & Plan  -Started PO hydrocortisone 4/7/2021. Continue florinef.   -Needs outpatient endocrinology follow up.  -Monitor potassium while on florinef. 4.4 on 4/7/2021.  -Repeat orthostatics periodically if symptomatic         Obstructive uropathy- (present on admission)  Assessment & Plan  -Trial off of flomax given his hypotension.  Discontinued 4/6/2021. No retention continue off Flomax    Chronic venous insufficiency of lower extremity- (present on admission)  Assessment & Plan  -Continue compression stockings.    Colostomy present (HCC)- (present on admission)  Assessment & Plan  -History of fall at home Nov 2020 on left side with perforated colon and splenic fluid collection/hematoma s/p segmental colectomy, colostomy, mobilization of the splenic flexure, wound vac, and I&D Nov 2020.  -Wound care consulted for ostomy care, but he was non-participatory. He is now engaging in ostomy care with his bedside RNs, encouraged to be observant but do it independently.  -Patient must be independently able to care for his ostomy before inpatient psychiatry will accept him.  5/14: Discussed with patient's primary surgeon, Dr. Desouza, he is currently out of town and will reevaluate the patient on 5/24 with repeat CMP in prealbumin to check patient nutrition status for colostomy reversal.  Stated that information to the patient this morning.    Debility- (present on admission)  Assessment & Plan  -Cleared by PT/OT.  -Out of bed for each meal and ambulate in the hallways at least twice per shift.    Mixed hyperlipidemia- (present on  admission)  Assessment & Plan  Lab Results   Component Value Date/Time    CHOLSTRLTOT 146 02/14/2021 01:30 AM    LDL 82 02/14/2021 01:30 AM    HDL 32 (A) 02/14/2021 01:30 AM    TRIGLYCERIDE 160 (H) 02/14/2021 01:30 AM   -Continue atorvastatin.     Lower limb amputation, great toe (HCC)- (present on admission)  Assessment & Plan  -History of, 2018.  Site well-healed.    Class 2 severe obesity with serious comorbidity and body mass index (BMI) of 35.0 to 35.9 in adult (HCC)- (present on admission)  Assessment & Plan  Body mass index is 35.19 kg/m².  -Could benefit from outpatient weight loss counseling which can be arranged through his PCP after hospital discharge.       VTE prophylaxis: Eliquis

## 2021-05-24 NOTE — CONSULTS
"PSYCHOLOGICAL FOLLOW-UP:  Reason for admission: Syncope and collapse [R55]  Suicidal ideation [R45.851]  Sepsis due to Klebsiella pneumoniae (HCC) [A41.4]  Length of Visit: 16min    Legal status: Legal Status: Involuntary    Chief Complaint: \"I'm on a roller coaster.\"    HPI: Met with the patient to assess risk level and provide crisis intervention services. Patient presented with an anxious affect and reported a \"ok\" mood. He endorsed experiencing some suicidal thoughts but denied plan and intent. He was able to identify that the thoughts were related to anxiety, in particular worry that it will not occur. He shared his plan to take it one day at a time. Discussed how to be excited and remain realistic about the possibility that the surgery could be cancelled. Patient stated his understanding. Encouraged him to continue focusing on the present moment to aid in coping with anxiety.     Psychiatric Examination:  Vitals: Blood pressure 118/60, pulse 76, temperature 35.9 °C (96.7 °F), temperature source Temporal, resp. rate 18, height 1.829 m (6'), weight 116 kg (256 lb 13.4 oz), SpO2 97 %.  Musculoskeletal: normal psychomotor activity, no tics or unusual mannerisms noted  Appearance and Eye Contact: appropriate dress and grooming. Behavior is calm, cooperative,  appropriate eye contact  Attention/Alertness: Alert  Thought Process: Linear, Logical and Goal Directed    Thought Content: No psychotic processes noted  Speech: Clear with normal rate and rhythm  Mood: \"ok\"            Affect: euthymic         SI/HI: Endorses    Memory: Recent and remote memory appear somewhat intact   Orientation: alert, oriented to person, place and time  Insight into symptoms: fair  Judgement into symptoms:fair    ASSESSMENT: While the patient's suicidal thoughts are chronic, he has a history of experiencing significant suicidality when stressed and is currently experiencing worry that his surgery may be cancelled. Will continue to follow " "him until after his surgery to focus on continued psychiatric stabilization. The legal hold remains extended for now.     As written in this writer's note dated 5/22/21 and remains her clinical opinion: \"This writer has seen the patient at least two times a week since 2/21/21. He has consistently identified his colostomy as one of the primary reasons for the increase in his suicidality. There have been times before when he thought his colostomy might be reversed and his mood and suicidality have improved as a result.      While this writer cannot definitely predict that reversal of the patient's colostomy will completely resolve his suicidality, it is her clinical opinion that it will aid in improving his mood and decreasing his suicidality so that he can be stabilized enough by this writer for outpatient mental health treatment. Even if this does not occur, outpatient psychiatric hospitals will be more likely to accept the patient if he does not have a colostomy.\"    DSM5 Diagnostic Considerations:   Unspecified Personality Disorder  Major Depressive Disorder, recurrent, severe, with psychotic features       PLAN:  Legal status: Involuntary  Anticipate F/U by a qualified mental health professional within 72 hours     Tele-sitter with q15 checks. Upgrade to 1:1 sitter if he engages in any self-harming or suicidal behavior or gesture.      Limit conversations with the patient to only as needed interactions as his suicidality can worsen with perceived negative interactions with staff.      Patient is allowed to have access to:     [employer]     TV with his call light. Please immediately remove if he appears to be engaging in any self-harm/suicidal behavior.      Legal hold restrictions as follows:     -Strip room of anything he can hurt himself with including all cords (except call light.) Do not leave anything in there that he could use to stick inside his body.     -Finger foods only     -Supervise ostomy bag " changes and remove all supplies once he is done     -Place gloves on the patient or consider restraints if he attempts to pull the ostomy out and/or hurts himself in anyway      -Do not allow the patient to walk around the unit as he could grab something to hurt himself with while on that walk.      -please continue to refer patient to inpatient psychiatric hospitals     Records reviewed: yes    Will continue to follow  Thank you for the consult.    Madhuri Fernandez, Ph.D.

## 2021-05-24 NOTE — DISCHARGE PLANNING
@3165  Agency/Facility Name: [facility]  Spoke To: Jorgito  Outcome: Was told patient will have surgery on the 27th then will wait for that and then review patient again.    @4538  Agency/Facility Name: [facility]  Outcome: Left message, awaiting call back.    @0900  Agency/Facility Name: [facility]  Outcome: Left message, awaiting call back.

## 2021-05-24 NOTE — DISCHARGE PLANNING
Anticipated Discharge Disposition:   Inpatient Psych    Action:   Per chart review, pt needs a new payee. [facility] only accepts new pts going to group home. RN CM called [facility] ([phone]). They will email new payee packet to this RN CM.    Per chart review, pt is medically cleared to discharge to inpatient psych. Still pending acceptance at this time. DPA to follow up.     Barriers to Discharge:   Inpatient psych acceptance and bed availability.    Plan:   Hospital Care Management will continue to follow and assist with discharge planning needs.

## 2021-05-25 ENCOUNTER — APPOINTMENT (OUTPATIENT)
Dept: RADIOLOGY | Facility: MEDICAL CENTER | Age: 56
DRG: 981 | End: 2021-05-25
Attending: SURGERY
Payer: MEDICAID

## 2021-05-25 LAB
ABO GROUP BLD: NORMAL
ALBUMIN SERPL BCP-MCNC: 3.8 G/DL (ref 3.2–4.9)
ALBUMIN/GLOB SERPL: 1 G/DL
ALP SERPL-CCNC: 154 U/L (ref 30–99)
ALT SERPL-CCNC: 52 U/L (ref 2–50)
ANION GAP SERPL CALC-SCNC: 11 MMOL/L (ref 7–16)
APTT PPP: 33.8 SEC (ref 24.7–36)
AST SERPL-CCNC: 35 U/L (ref 12–45)
BASOPHILS # BLD AUTO: 0.4 % (ref 0–1.8)
BASOPHILS # BLD: 0.05 K/UL (ref 0–0.12)
BILIRUB SERPL-MCNC: 0.5 MG/DL (ref 0.1–1.5)
BLD GP AB SCN SERPL QL: NORMAL
BUN SERPL-MCNC: 28 MG/DL (ref 8–22)
CALCIUM SERPL-MCNC: 9.7 MG/DL (ref 8.5–10.5)
CHLORIDE SERPL-SCNC: 97 MMOL/L (ref 96–112)
CO2 SERPL-SCNC: 30 MMOL/L (ref 20–33)
CREAT SERPL-MCNC: 1.09 MG/DL (ref 0.5–1.4)
EKG IMPRESSION: NORMAL
EOSINOPHIL # BLD AUTO: 0.5 K/UL (ref 0–0.51)
EOSINOPHIL NFR BLD: 4.3 % (ref 0–6.9)
ERYTHROCYTE [DISTWIDTH] IN BLOOD BY AUTOMATED COUNT: 46.1 FL (ref 35.9–50)
GLOBULIN SER CALC-MCNC: 3.8 G/DL (ref 1.9–3.5)
GLUCOSE SERPL-MCNC: 121 MG/DL (ref 65–99)
HCT VFR BLD AUTO: 47.2 % (ref 42–52)
HGB BLD-MCNC: 15.2 G/DL (ref 14–18)
IMM GRANULOCYTES # BLD AUTO: 0.04 K/UL (ref 0–0.11)
IMM GRANULOCYTES NFR BLD AUTO: 0.3 % (ref 0–0.9)
INR PPP: 1.09 (ref 0.87–1.13)
LYMPHOCYTES # BLD AUTO: 3.07 K/UL (ref 1–4.8)
LYMPHOCYTES NFR BLD: 26.5 % (ref 22–41)
MAGNESIUM SERPL-MCNC: 1.8 MG/DL (ref 1.5–2.5)
MCH RBC QN AUTO: 28.6 PG (ref 27–33)
MCHC RBC AUTO-ENTMCNC: 32.2 G/DL (ref 33.7–35.3)
MCV RBC AUTO: 88.9 FL (ref 81.4–97.8)
MONOCYTES # BLD AUTO: 0.62 K/UL (ref 0–0.85)
MONOCYTES NFR BLD AUTO: 5.4 % (ref 0–13.4)
NEUTROPHILS # BLD AUTO: 7.29 K/UL (ref 1.82–7.42)
NEUTROPHILS NFR BLD: 63.1 % (ref 44–72)
NRBC # BLD AUTO: 0 K/UL
NRBC BLD-RTO: 0 /100 WBC
PHOSPHATE SERPL-MCNC: 3.6 MG/DL (ref 2.5–4.5)
PLATELET # BLD AUTO: 234 K/UL (ref 164–446)
PMV BLD AUTO: 11.2 FL (ref 9–12.9)
POTASSIUM SERPL-SCNC: 3.8 MMOL/L (ref 3.6–5.5)
PREALB SERPL-MCNC: 23.4 MG/DL (ref 18–38)
PROT SERPL-MCNC: 7.6 G/DL (ref 6–8.2)
PROTHROMBIN TIME: 14.4 SEC (ref 12–14.6)
RBC # BLD AUTO: 5.31 M/UL (ref 4.7–6.1)
RH BLD: NORMAL
SARS-COV-2 RNA RESP QL NAA+PROBE: NOTDETECTED
SODIUM SERPL-SCNC: 138 MMOL/L (ref 135–145)
SPECIMEN SOURCE: NORMAL
WBC # BLD AUTO: 11.6 K/UL (ref 4.8–10.8)

## 2021-05-25 PROCEDURE — 700102 HCHG RX REV CODE 250 W/ 637 OVERRIDE(OP): Performed by: HOSPITALIST

## 2021-05-25 PROCEDURE — 93005 ELECTROCARDIOGRAM TRACING: CPT | Performed by: INTERNAL MEDICINE

## 2021-05-25 PROCEDURE — 93010 ELECTROCARDIOGRAM REPORT: CPT | Performed by: INTERNAL MEDICINE

## 2021-05-25 PROCEDURE — 83735 ASSAY OF MAGNESIUM: CPT

## 2021-05-25 PROCEDURE — 700102 HCHG RX REV CODE 250 W/ 637 OVERRIDE(OP): Performed by: STUDENT IN AN ORGANIZED HEALTH CARE EDUCATION/TRAINING PROGRAM

## 2021-05-25 PROCEDURE — A9270 NON-COVERED ITEM OR SERVICE: HCPCS | Performed by: STUDENT IN AN ORGANIZED HEALTH CARE EDUCATION/TRAINING PROGRAM

## 2021-05-25 PROCEDURE — 700102 HCHG RX REV CODE 250 W/ 637 OVERRIDE(OP): Performed by: NURSE PRACTITIONER

## 2021-05-25 PROCEDURE — U0005 INFEC AGEN DETEC AMPLI PROBE: HCPCS

## 2021-05-25 PROCEDURE — 770001 HCHG ROOM/CARE - MED/SURG/GYN PRIV*

## 2021-05-25 PROCEDURE — 84134 ASSAY OF PREALBUMIN: CPT

## 2021-05-25 PROCEDURE — 85610 PROTHROMBIN TIME: CPT

## 2021-05-25 PROCEDURE — 71045 X-RAY EXAM CHEST 1 VIEW: CPT

## 2021-05-25 PROCEDURE — A9270 NON-COVERED ITEM OR SERVICE: HCPCS | Performed by: NURSE PRACTITIONER

## 2021-05-25 PROCEDURE — 36415 COLL VENOUS BLD VENIPUNCTURE: CPT

## 2021-05-25 PROCEDURE — 80053 COMPREHEN METABOLIC PANEL: CPT

## 2021-05-25 PROCEDURE — A9270 NON-COVERED ITEM OR SERVICE: HCPCS | Performed by: HOSPITALIST

## 2021-05-25 PROCEDURE — 99232 SBSQ HOSP IP/OBS MODERATE 35: CPT | Performed by: INTERNAL MEDICINE

## 2021-05-25 PROCEDURE — 86900 BLOOD TYPING SEROLOGIC ABO: CPT

## 2021-05-25 PROCEDURE — 90832 PSYTX W PT 30 MINUTES: CPT | Performed by: SOCIAL WORKER

## 2021-05-25 PROCEDURE — 84100 ASSAY OF PHOSPHORUS: CPT

## 2021-05-25 PROCEDURE — U0003 INFECTIOUS AGENT DETECTION BY NUCLEIC ACID (DNA OR RNA); SEVERE ACUTE RESPIRATORY SYNDROME CORONAVIRUS 2 (SARS-COV-2) (CORONAVIRUS DISEASE [COVID-19]), AMPLIFIED PROBE TECHNIQUE, MAKING USE OF HIGH THROUGHPUT TECHNOLOGIES AS DESCRIBED BY CMS-2020-01-R: HCPCS

## 2021-05-25 PROCEDURE — 86850 RBC ANTIBODY SCREEN: CPT

## 2021-05-25 PROCEDURE — 85025 COMPLETE CBC W/AUTO DIFF WBC: CPT

## 2021-05-25 PROCEDURE — 85730 THROMBOPLASTIN TIME PARTIAL: CPT

## 2021-05-25 PROCEDURE — 86901 BLOOD TYPING SEROLOGIC RH(D): CPT

## 2021-05-25 RX ORDER — NEOMYCIN SULFATE 500 MG/1
500 TABLET ORAL 3 TIMES DAILY
Status: DISCONTINUED | OUTPATIENT
Start: 2021-05-26 | End: 2021-05-28

## 2021-05-25 RX ORDER — POLYETHYLENE GLYCOL 3350 17 G/17G
1 POWDER, FOR SOLUTION ORAL DAILY
Status: DISCONTINUED | OUTPATIENT
Start: 2021-05-26 | End: 2021-06-07

## 2021-05-25 RX ORDER — METRONIDAZOLE 500 MG/1
500 TABLET ORAL EVERY 8 HOURS
Status: DISCONTINUED | OUTPATIENT
Start: 2021-05-26 | End: 2021-05-28

## 2021-05-25 RX ORDER — DEXTROSE MONOHYDRATE, SODIUM CHLORIDE, AND POTASSIUM CHLORIDE 50; 1.49; 4.5 G/1000ML; G/1000ML; G/1000ML
INJECTION, SOLUTION INTRAVENOUS CONTINUOUS
Status: DISCONTINUED | OUTPATIENT
Start: 2021-05-26 | End: 2021-05-27

## 2021-05-25 RX ORDER — ENEMA 19; 7 G/133ML; G/133ML
1 ENEMA RECTAL ONCE
Status: COMPLETED | OUTPATIENT
Start: 2021-05-26 | End: 2021-05-26

## 2021-05-25 RX ADMIN — DIGOXIN 125 MCG: 125 TABLET ORAL at 17:28

## 2021-05-25 RX ADMIN — METOPROLOL SUCCINATE 12.5 MG: 25 TABLET, EXTENDED RELEASE ORAL at 04:48

## 2021-05-25 RX ADMIN — FLUDROCORTISONE ACETATE 0.1 MG: 0.1 TABLET ORAL at 04:48

## 2021-05-25 RX ADMIN — ZIPRASIDONE HYDROCHLORIDE 60 MG: 60 CAPSULE ORAL at 07:28

## 2021-05-25 RX ADMIN — LEVOTHYROXINE SODIUM 112 MCG: 0.11 TABLET ORAL at 04:48

## 2021-05-25 RX ADMIN — CLONAZEPAM 0.5 MG: 0.5 TABLET ORAL at 07:28

## 2021-05-25 RX ADMIN — ATORVASTATIN CALCIUM 40 MG: 40 TABLET, FILM COATED ORAL at 17:28

## 2021-05-25 RX ADMIN — CLONAZEPAM 1 MG: 1 TABLET ORAL at 17:28

## 2021-05-25 RX ADMIN — HYDROCORTISONE 10 MG: 10 TABLET ORAL at 04:48

## 2021-05-25 RX ADMIN — ZIPRASIDONE HYDROCHLORIDE 60 MG: 60 CAPSULE ORAL at 20:23

## 2021-05-25 RX ADMIN — PAROXETINE HYDROCHLORIDE 20 MG: 20 TABLET, FILM COATED ORAL at 07:27

## 2021-05-25 RX ADMIN — POTASSIUM CHLORIDE 20 MEQ: 20 TABLET, EXTENDED RELEASE ORAL at 04:48

## 2021-05-25 RX ADMIN — HYDROCORTISONE 5 MG: 10 TABLET ORAL at 16:18

## 2021-05-25 RX ADMIN — APIXABAN 5 MG: 5 TABLET, FILM COATED ORAL at 07:27

## 2021-05-25 RX ADMIN — DOCUSATE SODIUM 50 MG AND SENNOSIDES 8.6 MG 2 TABLET: 8.6; 5 TABLET, FILM COATED ORAL at 04:48

## 2021-05-25 RX ADMIN — CHLORTHALIDONE 25 MG: 25 TABLET ORAL at 04:48

## 2021-05-25 ASSESSMENT — ENCOUNTER SYMPTOMS
ABDOMINAL PAIN: 0
SHORTNESS OF BREATH: 0
COUGH: 0
HALLUCINATIONS: 0
BACK PAIN: 0
DIARRHEA: 0
PALPITATIONS: 0
FEVER: 0
VOMITING: 0
NAUSEA: 0
CONSTIPATION: 0
CHILLS: 0
DIZZINESS: 0
HEADACHES: 0

## 2021-05-25 NOTE — DISCHARGE PLANNING
Legal Hold     Referral: Legal Hold Court     Intervention: Pt presented for legal hold meeting with  via video conferencing to discuss legal options of contesting hold and meeting with the court doctors tomorrow afternoon, or not contesting legal hold and continuing the hold for one week.  advised that pt's legal hold will be be continued for one week (6/3).       Plan: Pt's legal hold has been continued for one week. Pt awaiting transfer to an in patient psych facility.

## 2021-05-25 NOTE — CARE PLAN
The patient is Stable - Low risk of patient condition declining or worsening    Shift Goals  Clinical Goals: no self half or others  Patient Goals: rest, reposition    Progress made toward(s) clinical / shift goals:    Problem: Knowledge Deficit - Standard  Goal: Patient and family/care givers will demonstrate understanding of plan of care, disease process/condition, diagnostic tests and medications  Outcome: Progressing     Problem: Depression  Goal: Patient and family/caregiver will verbalize accurate information about at least two of the possible causes of depression, three-four of the signs and symptoms of depression  Outcome: Progressing     Problem: Provide Safe Environment  Goal: Suicide environmental safety, protocols, policies, and practices will be implemented  Outcome: Progressing     Problem: Psychosocial  Goal: Patient's ability to identify and develop effective coping behaviors will improve  Outcome: Progressing  Goal: Patient's ability to identify and utilize available support systems will improve  Outcome: Progressing     Problem: Hemodynamics  Goal: Patient's hemodynamics, fluid balance and neurologic status will be stable or improve  Outcome: Progressing     Problem: Fluid Volume  Goal: Fluid volume balance will be maintained  Outcome: Progressing     Problem: Urinary - Renal Perfusion  Goal: Ability to achieve and maintain adequate renal perfusion and functioning will improve  Outcome: Progressing     Problem: Respiratory  Goal: Patient will achieve/maintain optimum respiratory ventilation and gas exchange  Outcome: Progressing     Problem: Mechanical Ventilation  Goal: Safe management of artificial airway and ventilation  Outcome: Progressing  Goal: Successful weaning off mechanical ventilator, spontaneously maintains adequate gas exchange  Outcome: Progressing  Goal: Patient will be able to express needs and understand communication  Outcome: Progressing     Problem: Physical Regulation  Goal:  Diagnostic test results will improve  Outcome: Progressing  Goal: Signs and symptoms of infection will decrease  Outcome: Progressing     Problem: Psychosocial  Goal: Patient's ability to re-evaluate and adapt role responsibilities will improve  Outcome: Progressing     Problem: Bowel Elimination  Goal: Establish and maintain regular bowel function  Outcome: Progressing       Patient is not progressing towards the following goals:

## 2021-05-25 NOTE — PROGRESS NOTES
Hospital Medicine Daily Progress Note    Date of Service  5/25/2021    Chief Complaint  56 y.o. male admitted 2/9/2021 with suicidal ideation    Hospital Course  Mr. Castro is a 56 year-old man with a past medical history significant for atrial fibrillation, secondary hypercoagulable state, diabetes mellitus complicated by diabetic neuropathy, chronic systolic CHF, colostomy placement, orthostatic hypotension (maintained on florinef), and dyslipidemia who presented to the ED on 2/9/2021 after he became dizzy and had a syncopal episode at home.  In the ED he was noted to be hypotensive and bradycardic. Cardiology was consulted and recommended stopping all AV sanket blocking medications.  He remained orthostatic during his hospitalization so midodrine was started and then transit to fludrocortisone bid. BP stabilized after.  An echocardiogram was obtained and showed mild mitral regurgitation and an EF of 60% which was improved from his previous echocardiogram obtained on 11/2020 (previously 35%).  CTAs of the head and neck were done and were unremarkable outside of distal bilateral ICA tortuosity.  Also during his hospitalization he expressed desire to kill himself. Psychiatry consulted, medication adjusted and legal hold placed.   He has a colostomy and has become overwhelmed by caring for it. He is now pending [facility] placement at the 6-month bd for maturity of his colostomy. And he is medically cleared for psychaittric inpatient treatment.    Interval Problem Update  5/25-patient seen and evaluated today.  Patient stated he did not have any suicidal ideation today.  Informed him of the possible surgery to be done on Thursday.  He is in current agreement with surgery.  Explained to him we will need to do a cardiac work-up which he is in agreement with.  Pending ECG, echo, laboratory studies, Covid check.    Consultants/Specialty  Cardiology  Psychiatry  General surgery    Code Status  Full  Code    Disposition  Pending surgery on Thursday 5/27.  If patient is no longer in suicidal ideation after surgery, he may be discharged to SNF.    Review of Systems  Review of Systems   Constitutional: Negative for chills, fever and malaise/fatigue.   Respiratory: Negative for cough and shortness of breath.    Cardiovascular: Negative for chest pain and palpitations.   Gastrointestinal: Negative for abdominal pain, constipation, diarrhea, nausea and vomiting.   Musculoskeletal: Negative for back pain and joint pain.   Neurological: Negative for dizziness and headaches.   Psychiatric/Behavioral: Negative for hallucinations and suicidal ideas.        Physical Exam  Temp:  [36.1 °C (96.9 °F)-36.4 °C (97.5 °F)] 36.4 °C (97.5 °F)  Pulse:  [66-80] 66  Resp:  [18] 18  BP: (111-135)/(59-79) 130/65  SpO2:  [92 %-98 %] 96 %    Physical Exam  Vitals and nursing note reviewed.   Constitutional:       General: He is not in acute distress.     Appearance: Normal appearance. He is not diaphoretic.   Cardiovascular:      Rate and Rhythm: Normal rate and regular rhythm.      Pulses: Normal pulses.      Heart sounds: Normal heart sounds. No murmur heard.     Pulmonary:      Effort: Pulmonary effort is normal. No respiratory distress.      Breath sounds: Normal breath sounds. No wheezing or rales.   Abdominal:      General: Abdomen is flat. Bowel sounds are normal. There is no distension.      Palpations: Abdomen is soft.      Tenderness: There is no abdominal tenderness.      Comments: Colostomy bag in place   Musculoskeletal:         General: No swelling or tenderness. Normal range of motion.      Right lower leg: Edema present.      Left lower leg: Edema present.   Skin:     General: Skin is warm.      Capillary Refill: Capillary refill takes less than 2 seconds.      Coloration: Skin is not jaundiced or pale.   Neurological:      General: No focal deficit present.      Mental Status: He is alert and oriented to person, place, and  time. Mental status is at baseline.   Psychiatric:         Mood and Affect: Mood normal.         Behavior: Behavior normal.         Thought Content: Thought content normal.         Judgment: Judgment normal.       Fluids    Intake/Output Summary (Last 24 hours) at 5/25/2021 1351  Last data filed at 5/25/2021 0800  Gross per 24 hour   Intake 840 ml   Output 450 ml   Net 390 ml       Laboratory  Recent Labs     05/23/21  0355   WBC 12.6*   RBC 5.20   HEMOGLOBIN 14.9   HEMATOCRIT 47.0   MCV 90.4   MCH 28.7   MCHC 31.7*   RDW 47.3   PLATELETCT 221   MPV 11.0     Recent Labs     05/23/21  0355   SODIUM 141   POTASSIUM 4.0   CHLORIDE 101   CO2 30   GLUCOSE 114*   BUN 29*   CREATININE 0.89   CALCIUM 9.7                   Imaging  DX-CHEST-PORTABLE (1 VIEW)   Final Result      1.  Minor asymmetric interstitial opacity in the right lower lobe which could represent minor interstitial edema, pneumonia, or fibrotic change.      2.  No left lung consolidation.      3.  No evidence of congestive failure.      UL-WHPTSJR-1 VIEW   Final Result         No specific finding to suggest small bowel obstruction.      [id number]-7 VIEW   Final Result         No significant interval change.      DX-CHEST-LIMITED (1 VIEW)   Final Result         Diffuse interstitial prominence could relate to mild pulmonary edema or atypical infection      NI-[id number]-1 VIEW   Final Result      Increased colonic gas without definite bowel obstruction.      US-RENAL   Final Result      No evidence of hydronephrosis.      Lobulated kidneys bilaterally.      CT-ABDOMEN-PELVIS WITH   Final Result      1.  There is no evidence of small bowel obstruction.   2.  There is a left lower quadrant ostomy.   3.  There is fluid distention of the colon distal to the surgical material in the left mid descending colon extending all the way to the rectum. There is no pneumatosis or free air.   4.  There is cholelithiasis without biliary dilatation.   5.  There has been  "interval removal of the drainage catheter anterior to the spleen with minimal hypodense area in the anterior spleen again noted. There is no new fluid collection.      IR-US GUIDED PIV   Final Result    Ultrasound-guided PERIPHERAL IV INSERTION performed by    qualified nursing staff as above.      EC-ECHOCARDIOGRAM COMPLETE W/O CONT   Final Result      DX-LUMBAR SPINE-2 OR 3 VIEWS   Final Result      Moderate compression deformity of T11 is new compared to 2018.      Mild wedge deformity of T12 is unchanged.      Degenerative changes including facet arthropathy.      Mild retrolisthesis of L5 on S1 and L3 on L4.         EC-ECHOCARDIOGRAM COMPLETE W/O CONT    (Results Pending)        Assessment/Plan  * Suicidal ideation- (present on admission)  Assessment & Plan  -Has history of MDD, recurrent with psychotic features. active SI w/ plan on admission  -Psychiatry following, recommended discontinuation of civil commitment - suspect possible malingering or secondary gain   - Evaluated bedside again and discussed with patient who states he \"wants to live\"   -Psych recommending sitter to ensure patient does not attempt actions that would extend his stay   5/5: Patient reports that he is telling staff that his suicidal ideations because he would like attention and not because he is actually having suicidal ideations.  5/10: placed on legal hold.  Patient is still on legal hold , pending placement to inpatient psych facility  Will need reevaluation after surgery    Orthostatic hypotension- (present on admission)  Assessment & Plan  -Multifactorial including adrenal insufficiency, medications (beta blocker, CCB, tamsulosin), diabetic autonomic dysfunction, and venous insufficiency.  -EF is noted to be 60%, significant improvement from prior (previously 35%).  -Per cardiology, no further cardiac work-up needed.  Recommends follow-up with them as outpatient.  -Fall precautions.  -Continue to educate on the importance of slow " positional changes.   - Started PO hydrocortisone 4/7/2021 for AI. Continuing florinef.   -Decreased toprolol which allowed for discontinuation of midodrine  Improved     Paroxysmal atrial fibrillation (HCC)- (present on admission)  Assessment & Plan  -CHADSVASC 3 (HTN, CHF, DM).   -TTE Feb 2021: EF 60%.  -Continue metoprolol, digoxin, and eliquis.    Chronic heart failure with preserved ejection fraction (HCC)- (present on admission)  Assessment & Plan  -Now stable.  -Pulmonary edema noted in mid-March, was placed on IV diuresis though that was held when he developed a gram-negative gina bacteremia and sepsis.  Currently doing well from a respiratory standpoint, no shortness of breath, lungs clear to auscultation bilaterally, not requiring oxygen supplementation.  -Repeat echocardiogram showed improvement in his ejection fraction to 60% 02/21.  -Continue metoprolol succinate.  -Lisinopril held per cardiology recommendations due to hypotension.    Cardiac work-up for surgery pending ECG and echo    Diabetes mellitus type 2, insulin dependent (HCC)- (present on admission)  Assessment & Plan  -A1c 5.2% on 2/14/2021.  -Continue diabetic diet.  -Continue lantus at current dose   -Hypoglycemia protocol in place if needed.    Major depressive disorder, recurrent, severe with psychotic features (HCC)- (present on admission)  Assessment & Plan  -Management as listed under 'anxiety and depression'.    Anxiety and depression- (present on admission)  Assessment & Plan  -Legal hold, awaiting acceptance at psych facility ([facility] will take in May).  -Continue paxil, klonopin & geodon.     Hypothyroidism- (present on admission)  Assessment & Plan  -TFTs in Feb were WNL. Continue levothyroxine at current dose.     Adrenal insufficiency (HCC)- (present on admission)  Assessment & Plan  -Started PO hydrocortisone 4/7/2021. Continue florinef.   -Needs outpatient endocrinology follow up.  -Monitor potassium while on florinef. 4.4 on  4/7/2021.  -Repeat orthostatics periodically if symptomatic     Obstructive uropathy- (present on admission)  Assessment & Plan  -Trial off of flomax given his hypotension.  Discontinued 4/6/2021. No retention continue off Flomax    Chronic venous insufficiency of lower extremity- (present on admission)  Assessment & Plan  -Continue compression stockings.    Colostomy present (HCC)- (present on admission)  Assessment & Plan  -History of fall at home Nov 2020 on left side with perforated colon and splenic fluid collection/hematoma s/p segmental colectomy, colostomy, mobilization of the splenic flexure, wound vac, and I&D Nov 2020.  -Wound care consulted for ostomy care, but he was non-participatory. He is now engaging in ostomy care with his bedside RNs, encouraged to be observant but do it independently.  -Patient must be independently able to care for his ostomy before inpatient psychiatry will accept him.  5/14: Discussed with patient's primary surgeon, Dr. Desouza, he is currently out of town and will reevaluate the patient on 5/24 with repeat CMP in prealbumin to check patient nutrition status for colostomy reversal.  Stated that information to the patient this morning.    Patient is scheduled for surgery on 5/27 with Dr. Lyly JACINTO cardiac risk: 0.5% (Risk of MI or cardiac arrest, intraoperatively or up to 30 days post-op).  OPHELIA respiratory risk: 4.2% (Risk of mechanical ventalation for > 48 hours post-op or reintubation in 30 days)  Revised Cardiac Risk Index: 10.1% (30 day risk of death, MI or cardiac arrest)  Patient appears to be a LOW risk for vj-operative complications    Checking ECG and echo    Debility- (present on admission)  Assessment & Plan  -Cleared by PT/OT.  -Out of bed for each meal and ambulate in the hallways at least twice per shift.    Mixed hyperlipidemia- (present on admission)  Assessment & Plan  Lab Results   Component Value Date/Time    CHOLSTRLTOT 146 02/14/2021 01:30 AM     LDL 82 02/14/2021 01:30 AM    HDL 32 (A) 02/14/2021 01:30 AM    TRIGLYCERIDE 160 (H) 02/14/2021 01:30 AM   -Continue atorvastatin.     Lower limb amputation, great toe (HCC)- (present on admission)  Assessment & Plan  -History of, 2018.  Site well-healed.    Class 2 severe obesity with serious comorbidity and body mass index (BMI) of 35.0 to 35.9 in adult (HCC)- (present on admission)  Assessment & Plan  Body mass index is 35.19 kg/m².  -Could benefit from outpatient weight loss counseling which can be arranged through his PCP after hospital discharge.     VTE prophylaxis: Discontinuing Eliquis at this time, Lovenox and SCDs

## 2021-05-25 NOTE — CARE PLAN
Assumed day shift care at start of shift  Patient a+o x 4  denies pain  Vss, afebrile  Colostomy to left abdomen - patent  Dressings x 2 to abdomen - c/d/I  Denies SI-Tele sitter in room   No needs at this time, wctm     Fall precautions/hourly rounding maintained, call light within reach and functioning, all items within reach.  Patient encouraged to call for assistance, poc reviewed with patient, ?'s/concerns answered.  Bed alarm active.     Problem: Psychosocial  Goal: Patient's ability to identify and develop effective coping behaviors will improve  Outcome: Progressing    The patient is Stable - Low risk of patient condition declining or worsening    Shift Goals  Clinical Goals: no thoughts of self-harm  Patient Goals: rest, reposition    Progress made toward(s) clinical / shift goals:  no SI on assessment    Patient is not progressing towards the following goals: n/a

## 2021-05-25 NOTE — CONSULTS
Trauma Surgery History and Physical    Chief Complaint: Colostomy status post large bowel injury in October 2020.     History of Present Illness: The patient is a patient known to me who underwent exploratory laparotomy, segmental colectomy and colostomy for what appears to be a delayed bowel injury after a ground-level fall.  Patient had some minor wound issues but otherwise recovered uneventfully after his surgery.  He has numerous medical comorbidities including diabetes and  heart failure.  Patient was unwilling/unable to participate in colostomy care and was readmitted by the medical service due to suicidal ideations caused by the presence of his colostomy.  He has some urinary retention issues this admission that have resolved.  He was last seen a few months ago and at that point he was not a good candidate for reversal procedure due to obesity and active medical issues.  Patient has since lost a fair amount of weight on a hospital supervised diet and has become medically stabilized.  He is eager to have his colostomy reversed.  Patient is currently on Eliquis for atrial fibrillation. We were reconsulted by Dr. Jarrett to see about scheduling patient for surgery.        Past Medical History:  has a past medical history of A-fib ([facility]), Abscess (11/8/2020), Bacteremia (11/8/2020), Chest pain, Congestive heart failure ([facility]), Diabetes ([facility]), Diabetic neuropathy ([facility]), Hypercholesterolemia, Hypertension, Longstanding persistent atrial fibrillation ([facility]) (11/8/2020), LV dysfunction (11/8/2020), Morbid obesity ([facility]), NICM (nonischemic cardiomyopathy) ([facility]) (11/8/2020), Noncompliance, Normal cardiac stress test, Orthostatic hypotension, and Sepsis ([facility]) (11/8/2020).    Past Surgical History:  has a past surgical history that includes toe amputation (Right, 11/10/2017); toe amputation (Right, 1/17/2018); irrigation & debridement ortho (Right, 2/19/2018); zzz cardiac cath (07/21/2018); and colectomy (N/A,  11/10/2020).    Allergies:   Allergies   Allergen Reactions   • Zosyn [Piperacillin Sod-Tazobactam So] Rash and Itching     Redness/flushed throughout body.    • Daptomycin Rash     Body rash  RXN=1/2018     • Pcn [Penicillins] Hives and Rash      Rash & hives  RXN=since a kid  Tolerates cephalosporins   • Unasyn [Ampicillin-Sulbactam Sodium] Hives, Itching and Swelling   • Vancomycin Rash     Red man syndrome. Tolerates IV vancomycin at reduced infusion rate.       Current Medications:    Home Medications     Reviewed by Trent Decker M.D. (Physician) on 03/14/21 at 1502  Med List Status: Complete   Medication Last Dose Status   atorvastatin (LIPITOR) 40 MG Tab 2/8/2021 Active   digoxin (LANOXIN) 125 MCG Tab 2/8/2021 Active   DILTIAZem CD (CARDIZEM CD) 360 MG CAPSULE SR 24 HR 2/8/2021 Active   fludrocortisone (FLORINEF) 0.1 MG Tab 2/8/2021 Active   furosemide (LASIX) 20 MG Tab 2/8/2021 Active   insulin glargine (INSULIN GLARGINE) 100 UNIT/ML Solution Pen-injector injection 2/8/2021 Active   levothyroxine (SYNTHROID) 112 MCG Tab 2/8/2021 Active   lisinopril (PRINIVIL) 10 MG Tab 2/8/2021 Active   loratadine (CLARITIN) 10 MG Tab 2/8/2021 Active   metoprolol SR (TOPROL XL) 25 MG TABLET SR 24 HR 2/8/2021 Active   modafinil (PROVIGIL) 100 MG Tab 2/8/2021 Active   risperiDONE (RISPERDAL) 2 MG Tab 2/8/2021 Active   tamsulosin (FLOMAX) 0.4 MG capsule 2/8/2021 Active                Family History: family history includes No Known Problems in his father and mother.    Social History:  reports that he has never smoked. He has never used smokeless tobacco. He reports that he does not drink alcohol and does not use drugs.    Review of Systems: Review of systems is remarkable for the following Significant for suicidal ideations, chronic neuropathy, but denies chest pain, shortness of breath and dyspnea on exertion.. The remainder of the comprehensive ROS is negative with the exception of the aforementioned HPI, PMH, and PSH  bullets in accordance with CMS guideline.    Physical Examination:     Constitutional:     Vital Signs: /65   Pulse 66   Temp 36.4 °C (97.5 °F) (Temporal)   Resp 18   Ht 1.829 m (6')   Wt 116 kg (256 lb 13.4 oz)   SpO2 96%    General Appearance: The patient is a pleasant  and cooperative man in no critical distress.  HEENT:    No jaundice or icterus.  Mucous membranes moist  Neck:    Good range of motion without crepitus or tenderness  Respiratory:   Inspection: Unlabored respirations, no intercostal retractions, paradoxical motion, or accessory muscle use.   Auscultation: clear to auscultation.  Cardiovascular:   Inspection: The skin is warm and well purfused.  Auscultation: Regular rate and rhythm.   Peripheral Pulses: Normal.   Abdomen:   Inspection: Abdominal inspection reveals no abrasions, contusions, lacerations or penetrating wounds and Colostomy present.  Hydrofera Blue on 2 locations of his old midline wound.   Palpation: Palpation is remarkable for nontender.  Musculoskeletal:   Bilateral lower extremity dependent edema without wound or deformity  Skin:    Examination of the skin reveals no lesions.  Neurologic:   Neurologic examination reveals no focal deficits noted.  Psychiatric:    Patient appears calm and cooperative    Laboratory Values:   Recent Labs     05/23/21  0355   WBC 12.6*   RBC 5.20   HEMOGLOBIN 14.9   HEMATOCRIT 47.0   MCV 90.4   MCH 28.7   MCHC 31.7*   RDW 47.3   PLATELETCT 221   MPV 11.0     Recent Labs     05/23/21  0355   SODIUM 141   POTASSIUM 4.0   CHLORIDE 101   CO2 30   GLUCOSE 114*   BUN 29*   CREATININE 0.89   CALCIUM 9.7                Imaging:   DX-CHEST-PORTABLE (1 VIEW)   Final Result      1.  Minor asymmetric interstitial opacity in the right lower lobe which could represent minor interstitial edema, pneumonia, or fibrotic change.      2.  No left lung consolidation.      3.  No evidence of congestive failure.      CL-PPKEOSS-3 VIEW   Final Result         No  specific finding to suggest small bowel obstruction.      [id number] VIEW   Final Result         No significant interval change.      DX-CHEST-LIMITED (1 VIEW)   Final Result         Diffuse interstitial prominence could relate to mild pulmonary edema or atypical infection      [id number]-[id number]-5 VIEW   Final Result      Increased colonic gas without definite bowel obstruction.      US-RENAL   Final Result      No evidence of hydronephrosis.      Lobulated kidneys bilaterally.      CT-ABDOMEN-PELVIS WITH   Final Result      1.  There is no evidence of small bowel obstruction.   2.  There is a left lower quadrant ostomy.   3.  There is fluid distention of the colon distal to the surgical material in the left mid descending colon extending all the way to the rectum. There is no pneumatosis or free air.   4.  There is cholelithiasis without biliary dilatation.   5.  There has been interval removal of the drainage catheter anterior to the spleen with minimal hypodense area in the anterior spleen again noted. There is no new fluid collection.      IR-US GUIDED PIV   Final Result    Ultrasound-guided PERIPHERAL IV INSERTION performed by    qualified nursing staff as above.      EC-ECHOCARDIOGRAM COMPLETE W/O CONT   Final Result      DX-LUMBAR SPINE-2 OR 3 VIEWS   Final Result      Moderate compression deformity of T11 is new compared to 2018.      Mild wedge deformity of T12 is unchanged.      Degenerative changes including facet arthropathy.      Mild retrolisthesis of L5 on S1 and L3 on L4.         EC-ECHOCARDIOGRAM COMPLETE W/O CONT    (Results Pending)       Assessment and Plan:   56-year-old male currently hospitalized for suicidal ideation and medical stabilization of numerous comorbidities.  He remains on legal hold and his primary reason is that his colostomy caused him sufficient anxiety to be suicidal.  Patient has been medically stabilized and has lost a significant amount of weight and I think he is medically  appropriate for colostomy reversal.  Based on his previous history, I cannot be certain that simply reversing his colostomy will improve his psychiatric issues, but I think that a successful operation would certainly improve his general hygiene and will help us facilitate discharge to inpatient facility, if needed.  I discussed the case with the hospitalist covering the patient and appropriate preoperative work-up has been ordered including labs, EKG, chest x-ray, follow-up echocardiogram.  Bowel prep has been ordered to start tomorrow morning though he should be clear liquids only after dinner tonight.  His anticoagulation has been held and prophylactic Lovenox should start tomorrow night.  He has multiple antibiotic allergies so we will need to decide on the appropriate preoperative antibiotic for him.  Likely this will be meropenem.  I described the procedure in detail including its attendant risks which include but are not limited to: Bleeding, wound infection, organ space infection, anastomotic failure, sepsis, cardiopulmonary complications and likely urinary retention given his history this year.  Patient is willing to proceed with the procedure.  We will clarify that he is able to sign his own consents and he is currently on the schedule for 1:30 PM on 5/27.  We will follow up his preoperative studies and risk stratification will be documented by the hospitalist, with cardiology if appropriate.       ____________________________________     Kin Desouza D.O.    DD: 5/25/2021  12:35 PM

## 2021-05-26 ENCOUNTER — APPOINTMENT (OUTPATIENT)
Dept: CARDIOLOGY | Facility: MEDICAL CENTER | Age: 56
DRG: 981 | End: 2021-05-26
Attending: INTERNAL MEDICINE
Payer: MEDICAID

## 2021-05-26 LAB
LV EJECT FRACT  99904: 55
LV EJECT FRACT MOD 2C 99903: 63.64
LV EJECT FRACT MOD 4C 99902: 54.5
LV EJECT FRACT MOD BP 99901: 54.65

## 2021-05-26 PROCEDURE — A9270 NON-COVERED ITEM OR SERVICE: HCPCS | Performed by: HOSPITALIST

## 2021-05-26 PROCEDURE — A9270 NON-COVERED ITEM OR SERVICE: HCPCS | Performed by: SURGERY

## 2021-05-26 PROCEDURE — 93306 TTE W/DOPPLER COMPLETE: CPT

## 2021-05-26 PROCEDURE — A9270 NON-COVERED ITEM OR SERVICE: HCPCS | Performed by: STUDENT IN AN ORGANIZED HEALTH CARE EDUCATION/TRAINING PROGRAM

## 2021-05-26 PROCEDURE — 700101 HCHG RX REV CODE 250: Performed by: SURGERY

## 2021-05-26 PROCEDURE — 700102 HCHG RX REV CODE 250 W/ 637 OVERRIDE(OP): Performed by: NURSE PRACTITIONER

## 2021-05-26 PROCEDURE — 770001 HCHG ROOM/CARE - MED/SURG/GYN PRIV*

## 2021-05-26 PROCEDURE — 700102 HCHG RX REV CODE 250 W/ 637 OVERRIDE(OP): Performed by: SURGERY

## 2021-05-26 PROCEDURE — 700102 HCHG RX REV CODE 250 W/ 637 OVERRIDE(OP): Performed by: STUDENT IN AN ORGANIZED HEALTH CARE EDUCATION/TRAINING PROGRAM

## 2021-05-26 PROCEDURE — 99232 SBSQ HOSP IP/OBS MODERATE 35: CPT | Performed by: INTERNAL MEDICINE

## 2021-05-26 PROCEDURE — 93306 TTE W/DOPPLER COMPLETE: CPT | Mod: 26 | Performed by: INTERNAL MEDICINE

## 2021-05-26 PROCEDURE — 700117 HCHG RX CONTRAST REV CODE 255: Performed by: INTERNAL MEDICINE

## 2021-05-26 PROCEDURE — 700102 HCHG RX REV CODE 250 W/ 637 OVERRIDE(OP): Performed by: INTERNAL MEDICINE

## 2021-05-26 PROCEDURE — A9270 NON-COVERED ITEM OR SERVICE: HCPCS | Performed by: INTERNAL MEDICINE

## 2021-05-26 PROCEDURE — A9270 NON-COVERED ITEM OR SERVICE: HCPCS | Performed by: NURSE PRACTITIONER

## 2021-05-26 PROCEDURE — 700102 HCHG RX REV CODE 250 W/ 637 OVERRIDE(OP): Performed by: HOSPITALIST

## 2021-05-26 RX ORDER — UREA 10 %
500 LOTION (ML) TOPICAL ONCE
Status: COMPLETED | OUTPATIENT
Start: 2021-05-26 | End: 2021-05-26

## 2021-05-26 RX ADMIN — CLONAZEPAM 1 MG: 1 TABLET ORAL at 18:28

## 2021-05-26 RX ADMIN — CLONAZEPAM 0.5 MG: 0.5 TABLET ORAL at 09:09

## 2021-05-26 RX ADMIN — POTASSIUM CHLORIDE 20 MEQ: 20 TABLET, EXTENDED RELEASE ORAL at 06:14

## 2021-05-26 RX ADMIN — SODIUM PHOSPHATE 133 ML: 7; 19 ENEMA RECTAL at 17:20

## 2021-05-26 RX ADMIN — ZIPRASIDONE HYDROCHLORIDE 60 MG: 60 CAPSULE ORAL at 09:10

## 2021-05-26 RX ADMIN — Medication 500 MG: at 14:52

## 2021-05-26 RX ADMIN — POLYETHYLENE GLYCOL 3350 1 PACKET: 17 POWDER, FOR SOLUTION ORAL at 12:12

## 2021-05-26 RX ADMIN — POTASSIUM CHLORIDE, DEXTROSE MONOHYDRATE AND SODIUM CHLORIDE: 150; 5; 450 INJECTION, SOLUTION INTRAVENOUS at 18:28

## 2021-05-26 RX ADMIN — ZIPRASIDONE HYDROCHLORIDE 60 MG: 60 CAPSULE ORAL at 20:56

## 2021-05-26 RX ADMIN — NEOMYCIN SULFATE 500 MG: 500 TABLET ORAL at 12:13

## 2021-05-26 RX ADMIN — DIGOXIN 125 MCG: 125 TABLET ORAL at 18:27

## 2021-05-26 RX ADMIN — NEOMYCIN SULFATE 500 MG: 500 TABLET ORAL at 18:27

## 2021-05-26 RX ADMIN — METRONIDAZOLE 500 MG: 500 TABLET ORAL at 20:56

## 2021-05-26 RX ADMIN — PAROXETINE HYDROCHLORIDE 20 MG: 20 TABLET, FILM COATED ORAL at 09:10

## 2021-05-26 RX ADMIN — HUMAN ALBUMIN MICROSPHERES AND PERFLUTREN 3 ML: 10; .22 INJECTION, SOLUTION INTRAVENOUS at 10:32

## 2021-05-26 RX ADMIN — ATORVASTATIN CALCIUM 40 MG: 40 TABLET, FILM COATED ORAL at 18:28

## 2021-05-26 RX ADMIN — CHLORTHALIDONE 25 MG: 25 TABLET ORAL at 06:16

## 2021-05-26 RX ADMIN — METRONIDAZOLE 500 MG: 500 TABLET ORAL at 14:52

## 2021-05-26 RX ADMIN — METRONIDAZOLE 500 MG: 500 TABLET ORAL at 06:14

## 2021-05-26 RX ADMIN — HYDROCORTISONE 5 MG: 10 TABLET ORAL at 16:11

## 2021-05-26 RX ADMIN — FLUDROCORTISONE ACETATE 0.1 MG: 0.1 TABLET ORAL at 06:14

## 2021-05-26 RX ADMIN — NEOMYCIN SULFATE 500 MG: 500 TABLET ORAL at 06:15

## 2021-05-26 RX ADMIN — HYDROCORTISONE 10 MG: 10 TABLET ORAL at 06:15

## 2021-05-26 RX ADMIN — DOCUSATE SODIUM 50 MG AND SENNOSIDES 8.6 MG 2 TABLET: 8.6; 5 TABLET, FILM COATED ORAL at 06:13

## 2021-05-26 RX ADMIN — METOPROLOL SUCCINATE 12.5 MG: 25 TABLET, EXTENDED RELEASE ORAL at 06:13

## 2021-05-26 RX ADMIN — LEVOTHYROXINE SODIUM 112 MCG: 0.11 TABLET ORAL at 06:13

## 2021-05-26 ASSESSMENT — ENCOUNTER SYMPTOMS
HEADACHES: 0
ABDOMINAL PAIN: 0
DIZZINESS: 0
BACK PAIN: 0
DIARRHEA: 0
SHORTNESS OF BREATH: 0
VOMITING: 0
HALLUCINATIONS: 0
FEVER: 0
COUGH: 0
CONSTIPATION: 0
PALPITATIONS: 0
CHILLS: 0
NAUSEA: 0

## 2021-05-26 ASSESSMENT — PAIN DESCRIPTION - PAIN TYPE: TYPE: ACUTE PAIN

## 2021-05-26 NOTE — CARE PLAN
The patient is: Stable - Low risk of patient condition declining or worsening    Progress made toward(s) clinical / shift goals: Patient more participatory in self-care including ostomy care.    Patient is not progressing towards the following goals:  Patient remains suicide risk; 1:1 monitoring in place.        Problem: Knowledge Deficit - Standard  Goal: Patient and family/care givers will demonstrate understanding of plan of care, disease process/condition, diagnostic tests and medications  Outcome: Progressing     Problem: Depression  Goal: Patient and family/caregiver will verbalize accurate information about at least two of the possible causes of depression, three-four of the signs and symptoms of depression  Outcome: Progressing     Problem: Provide Safe Environment  Goal: Suicide environmental safety, protocols, policies, and practices will be implemented  Outcome: Progressing     Problem: Psychosocial  Goal: Patient's ability to identify and develop effective coping behaviors will improve  Outcome: Progressing  Goal: Patient's ability to identify and utilize available support systems will improve  Outcome: Progressing     Problem: Hemodynamics  Goal: Patient's hemodynamics, fluid balance and neurologic status will be stable or improve  Outcome: Progressing     Problem: Fluid Volume  Goal: Fluid volume balance will be maintained  Outcome: Progressing     Problem: Urinary - Renal Perfusion  Goal: Ability to achieve and maintain adequate renal perfusion and functioning will improve  Outcome: Progressing     Problem: Respiratory  Goal: Patient will achieve/maintain optimum respiratory ventilation and gas exchange  Outcome: Progressing     Problem: Physical Regulation  Goal: Diagnostic test results will improve  Outcome: Progressing  Goal: Signs and symptoms of infection will decrease  Outcome: Progressing     Problem: Psychosocial  Goal: Patient's ability to re-evaluate and adapt role responsibilities will  improve  Outcome: Progressing     Problem: Bowel Elimination  Goal: Establish and maintain regular bowel function  Outcome: Progressing     Problem: Fall Risk  Goal: Patient will remain free from falls  Outcome: Progressing         Problem: Mechanical Ventilation  Goal: Safe management of artificial airway and ventilation  Outcome: Met  Goal: Successful weaning off mechanical ventilator, spontaneously maintains adequate gas exchange  Outcome: Met  Goal: Patient will be able to express needs and understand communication  Outcome: Met

## 2021-05-26 NOTE — PROGRESS NOTES
Surgery    No acute changes overnight  On clear liquids    /67   Pulse 70   Temp 35.9 °C (96.7 °F) (Temporal)   Resp 16   Ht 1.829 m (6')   Wt 116 kg (256 lb 13.4 oz)   SpO2 97%   BMI 34.83 kg/m²     Assessment and plan:  Patient on schedule for surgery tomorrow afternoon at 130.    Preoperative work-up, labs, x-ray, EKG done yesterday  Low perioperative cardiac risk  Anticoagulation held yesterday    Currently on clear liquid diet, bowel prep started    Consent ordered    Discussed with bedside nurse and patient.

## 2021-05-26 NOTE — PROGRESS NOTES
Hospital Medicine Daily Progress Note    Date of Service  5/26/2021    Chief Complaint  56 y.o. male admitted 2/9/2021 with suicidal ideation    Hospital Course  Mr. Castro is a 56 year-old man with a past medical history significant for atrial fibrillation, secondary hypercoagulable state, diabetes mellitus complicated by diabetic neuropathy, chronic systolic CHF, colostomy placement, orthostatic hypotension (maintained on florinef), and dyslipidemia who presented to the ED on 2/9/2021 after he became dizzy and had a syncopal episode at home.  In the ED he was noted to be hypotensive and bradycardic. Cardiology was consulted and recommended stopping all AV sanket blocking medications.  He remained orthostatic during his hospitalization so midodrine was started and then transit to fludrocortisone bid. BP stabilized after.  An echocardiogram was obtained and showed mild mitral regurgitation and an EF of 60% which was improved from his previous echocardiogram obtained on 11/2020 (previously 35%).  CTAs of the head and neck were done and were unremarkable outside of distal bilateral ICA tortuosity.  Also during his hospitalization he expressed desire to kill himself. Psychiatry consulted, medication adjusted and legal hold placed.   He has a colostomy and has become overwhelmed by caring for it. He is now pending [facility] placement at the 6-month db for maturity of his colostomy. And he is medically cleared for psychaittric inpatient treatment.    Interval Problem Update  5/25-patient seen and evaluated today.  Patient stated he did not have any suicidal ideation today.  Informed him of the possible surgery to be done on Thursday.  He is in current agreement with surgery.  Explained to him we will need to do a cardiac work-up which he is in agreement with.  Pending ECG, echo, laboratory studies, Covid check.    5/26- patient stated he was doing well, denied SI.  He is aware of his needed bowel prep and stated he will  completed and keep NPO at midnight.   Echo shows LVEF 55%, RVSP 25mmHg.    Consultants/Specialty  Cardiology  Psychiatry  General surgery    Code Status  Full Code    Disposition  Pending surgery on Thursday 5/27.  If patient is no longer in suicidal ideation after surgery, he may be discharged to SNF.    Review of Systems  Review of Systems   Constitutional: Negative for chills, fever and malaise/fatigue.   Respiratory: Negative for cough and shortness of breath.    Cardiovascular: Negative for chest pain and palpitations.   Gastrointestinal: Negative for abdominal pain, constipation, diarrhea, nausea and vomiting.   Musculoskeletal: Negative for back pain and joint pain.   Neurological: Negative for dizziness and headaches.   Psychiatric/Behavioral: Negative for hallucinations and suicidal ideas.      Physical Exam  Temp:  [35.8 °C (96.4 °F)-36 °C (96.8 °F)] 35.9 °C (96.7 °F)  Pulse:  [64-86] 70  Resp:  [16] 16  BP: (122-139)/(59-81) 139/67  SpO2:  [95 %-97 %] 97 %    Physical Exam  Vitals and nursing note reviewed.   Constitutional:       General: He is not in acute distress.     Appearance: Normal appearance. He is not diaphoretic.   Cardiovascular:      Rate and Rhythm: Normal rate and regular rhythm.      Pulses: Normal pulses.      Heart sounds: Normal heart sounds. No murmur heard.     Pulmonary:      Effort: Pulmonary effort is normal. No respiratory distress.      Breath sounds: Normal breath sounds. No wheezing or rales.   Abdominal:      General: Abdomen is flat. Bowel sounds are normal. There is no distension.      Palpations: Abdomen is soft.      Tenderness: There is no abdominal tenderness.      Comments: Colostomy bag in place   Musculoskeletal:         General: No swelling or tenderness. Normal range of motion.      Right lower leg: Edema present.      Left lower leg: Edema present.   Skin:     General: Skin is warm.      Capillary Refill: Capillary refill takes less than 2 seconds.       Coloration: Skin is not jaundiced or pale.   Neurological:      General: No focal deficit present.      Mental Status: He is alert and oriented to person, place, and time. Mental status is at baseline.   Psychiatric:         Mood and Affect: Mood normal.         Behavior: Behavior normal.         Thought Content: Thought content normal.         Judgment: Judgment normal.       Fluids    Intake/Output Summary (Last 24 hours) at 5/26/2021 1303  Last data filed at 5/26/2021 1257  Gross per 24 hour   Intake 360 ml   Output 2255 ml   Net -1895 ml       Laboratory  Recent Labs     05/25/21  1513   WBC 11.6*   RBC 5.31   HEMOGLOBIN 15.2   HEMATOCRIT 47.2   MCV 88.9   MCH 28.6   MCHC 32.2*   RDW 46.1   PLATELETCT 234   MPV 11.2     Recent Labs     05/25/21  1513   SODIUM 138   POTASSIUM 3.8   CHLORIDE 97   CO2 30   GLUCOSE 121*   BUN 28*   CREATININE 1.09   CALCIUM 9.7     Recent Labs     05/25/21  1513   APTT 33.8   INR 1.09               Imaging  EC-ECHOCARDIOGRAM COMPLETE W/ CONT   Final Result      DX-CHEST-PORTABLE (1 VIEW)   Final Result      1.  Minor asymmetric interstitial opacity in the right lower lobe which could represent minor interstitial edema, pneumonia, or fibrotic change.      2.  No left lung consolidation.      3.  No evidence of congestive failure.      UN-WQGVVGP-1 VIEW   Final Result         No specific finding to suggest small bowel obstruction.      IN-ENJYWCM-4 VIEW   Final Result         No significant interval change.      DX-CHEST-LIMITED (1 VIEW)   Final Result         Diffuse interstitial prominence could relate to mild pulmonary edema or atypical infection      LY-IQIGXUQ-1 VIEW   Final Result      Increased colonic gas without definite bowel obstruction.      US-RENAL   Final Result      No evidence of hydronephrosis.      Lobulated kidneys bilaterally.      CT-ABDOMEN-PELVIS WITH   Final Result      1.  There is no evidence of small bowel obstruction.   2.  There is a left lower quadrant  "ostomy.   3.  There is fluid distention of the colon distal to the surgical material in the left mid descending colon extending all the way to the rectum. There is no pneumatosis or free air.   4.  There is cholelithiasis without biliary dilatation.   5.  There has been interval removal of the drainage catheter anterior to the spleen with minimal hypodense area in the anterior spleen again noted. There is no new fluid collection.      IR-US GUIDED PIV   Final Result    Ultrasound-guided PERIPHERAL IV INSERTION performed by    qualified nursing staff as above.      EC-ECHOCARDIOGRAM COMPLETE W/O CONT   Final Result      DX-LUMBAR SPINE-2 OR 3 VIEWS   Final Result      Moderate compression deformity of T11 is new compared to 2018.      Mild wedge deformity of T12 is unchanged.      Degenerative changes including facet arthropathy.      Mild retrolisthesis of L5 on S1 and L3 on L4.              Assessment/Plan  * Suicidal ideation- (present on admission)  Assessment & Plan  -Has history of MDD, recurrent with psychotic features. active SI w/ plan on admission  -Psychiatry following, recommended discontinuation of civil commitment - suspect possible malingering or secondary gain   - Evaluated bedside again and discussed with patient who states he \"wants to live\"   -Psych recommending sitter to ensure patient does not attempt actions that would extend his stay   5/5: Patient reports that he is telling staff that his suicidal ideations because he would like attention and not because he is actually having suicidal ideations.  5/10: placed on legal hold.  Patient is still on legal hold , pending placement to inpatient psych facility  Will need reevaluation after surgery    Colostomy present (HCC)- (present on admission)  Assessment & Plan  History of fall at home Nov 2020 on left side with perforated colon and splenic fluid collection/hematoma s/p segmental colectomy, colostomy, mobilization of the splenic flexure, wound " vac, and I&D Nov 2020.  -Wound care consulted for ostomy care    Patient is scheduled for surgery on 5/27 with Dr. Lyly JACINTO cardiac risk: 0.5% (Risk of MI or cardiac arrest, intraoperatively or up to 30 days post-op).  OPHELIA respiratory risk: 4.2% (Risk of mechanical ventalation for > 48 hours post-op or reintubation in 30 days)  Revised Cardiac Risk Index: 10.1% (30 day risk of death, MI or cardiac arrest)  ECG and Echo are showing low risks for surgery.  Echo shows LVEF 55%, RVSP 25mmHg.  Patient appears to be a LOW risk for vj-operative complications    Obstructive uropathy- (present on admission)  Assessment & Plan  -Trial off of flomax given his hypotension.  Discontinued 4/6/2021. No retention continue off Flomax    Paroxysmal atrial fibrillation (HCC)- (present on admission)  Assessment & Plan  -CHADSVASC 3 (HTN, CHF, DM).   -TTE Feb 2021: EF 60%.  -Continue metoprolol, digoxin, and eliquis.    Chronic heart failure with preserved ejection fraction (HCC)- (present on admission)  Assessment & Plan  -Now stable.  -Pulmonary edema noted in mid-March, was placed on IV diuresis though that was held when he developed a gram-negative gina bacteremia and sepsis.  Currently doing well from a respiratory standpoint, no shortness of breath, lungs clear to auscultation bilaterally, not requiring oxygen supplementation.  -Repeat echocardiogram showed improvement in his ejection fraction to 60% 02/21.  -Continue metoprolol succinate.  -Lisinopril held per cardiology recommendations due to hypotension.  ECG and Echo are showing low risks for surgery.  Echo shows LVEF 55%, RVSP 25mmHg.    Major depressive disorder, recurrent, severe with psychotic features (HCC)- (present on admission)  Assessment & Plan  -Management as listed under 'anxiety and depression'.    Anxiety and depression- (present on admission)  Assessment & Plan  -Legal hold, awaiting acceptance at psych facility ([facility] will take in May).  -Continue  paxil, klonopin & geodon.     Adrenal insufficiency (HCC)- (present on admission)  Assessment & Plan  -Started PO hydrocortisone 4/7/2021. Continue florinef.   -Needs outpatient endocrinology follow up.  -Monitor potassium while on florinef. 4.4 on 4/7/2021.  -Repeat orthostatics periodically if symptomatic     Debility- (present on admission)  Assessment & Plan  -Cleared by PT/OT.  -Out of bed for each meal and ambulate in the hallways at least twice per shift.    Orthostatic hypotension- (present on admission)  Assessment & Plan  -Multifactorial including adrenal insufficiency, medications (beta blocker, CCB, tamsulosin), diabetic autonomic dysfunction, and venous insufficiency.  -EF is noted to be 60%, significant improvement from prior (previously 35%).  -Per cardiology, no further cardiac work-up needed.  Recommends follow-up with them as outpatient.  -Fall precautions.  -Continue to educate on the importance of slow positional changes.   - Started PO hydrocortisone 4/7/2021 for AI. Continuing florinef.  Will need taper as outpatient.  -Decreased toprolol which allowed for discontinuation of midodrine  Improved     Chronic venous insufficiency of lower extremity- (present on admission)  Assessment & Plan  -Continue compression stockings.    Diabetes mellitus type 2, insulin dependent (HCC)- (present on admission)  Assessment & Plan  -A1c 5.2% on 2/14/2021.  -Continue diabetic diet.  -Continue lantus at current dose   -Hypoglycemia protocol in place if needed.    Hypothyroidism- (present on admission)  Assessment & Plan  -TFTs in Feb were WNL. Continue levothyroxine at current dose.     Mixed hyperlipidemia- (present on admission)  Assessment & Plan  -Continue atorvastatin.     Lower limb amputation, great toe (HCC)- (present on admission)  Assessment & Plan  -History of, 2018.  Site well-healed.    Class 2 severe obesity with serious comorbidity and body mass index (BMI) of 35.0 to 35.9 in adult (HCC)- (present  on admission)  Assessment & Plan  Body mass index is 35.19 kg/m².  -Could benefit from outpatient weight loss counseling which can be arranged through his PCP after hospital discharge.     VTE prophylaxis: Discontinuing Eliquis at this time, Lovenox and SCDs, hold at midnight.

## 2021-05-26 NOTE — DISCHARGE PLANNING
Anticipated Discharge Disposition:   Inpatient psych    Action:   Discussed discharge planning needs during rounds. Per MD, plan for possible surgery tomorrow.     Barriers to Discharge:   Medical clearance, plan for surgery tomorrow.  Inpatient Psych acceptance    Plan:   Hospital Care Management will continue to follow and assist with discharge planning needs.

## 2021-05-26 NOTE — PROGRESS NOTES
Bedside shift report received from ELISA Landrum.  Safety check complete.  Telesitter at the bedside.  All patient needs met at this time.  Will continue to monitor.

## 2021-05-26 NOTE — PROGRESS NOTES
Assumed care of patient at bedside report from NOC RN. Updated on POC. Patient currently A & O x 4; on RA; up SBA w/ FWW; denies pain at this time. Assessment completed, Call light within reach. Whiteboard updated. Fall precautions in place. Bed locked and in lowest position. All questions answered. No other needs indicated at this time.

## 2021-05-26 NOTE — CONSULTS
"Brief Behavioral Health Note:    Length of intervention: 30 minutes      Assessment  Sebastián Castro is a 56 year old male, seen sitting up on hospital bed, smiling and displayed a pleasant mood. Patient's speech was clear and coherent, no evidence of a thought disorder at the time of interview. Patient was engaged and willing to participate in the interview process. Patient reported, \"my surgery for my colostomy is scheduled, I'm and getting ready, they are drawing labs and I just met with the surgeon\". Patient reports feeling hopeful that the reversal will take place.   Regarding suicidal ideations patient responded, \"I feel better but I still have suicidal thoughts. I just want to wait until the surgery and see what happens\". Patient could not at this time enter into a no harm contract as he continues to endorse suicidal ideations although he did not express a plan or intent.No homicidal ideations reported.    Recommendations  1. Continue Telemonitor  2. Legal hold will remain intact  3. Please refer to Dr. Madhuri Fernandez's note dated 5/24 for further recommendations for safety protocols.    Thank you,    Susan Ewing, Ph.D., [facility]  "

## 2021-05-27 ENCOUNTER — ANESTHESIA EVENT (OUTPATIENT)
Dept: SURGERY | Facility: MEDICAL CENTER | Age: 56
DRG: 981 | End: 2021-05-27
Payer: MEDICAID

## 2021-05-27 ENCOUNTER — ANESTHESIA (OUTPATIENT)
Dept: SURGERY | Facility: MEDICAL CENTER | Age: 56
DRG: 981 | End: 2021-05-27
Payer: MEDICAID

## 2021-05-27 LAB — GLUCOSE BLD-MCNC: 120 MG/DL (ref 65–99)

## 2021-05-27 PROCEDURE — 700101 HCHG RX REV CODE 250: Performed by: SURGERY

## 2021-05-27 PROCEDURE — A9270 NON-COVERED ITEM OR SERVICE: HCPCS | Performed by: STUDENT IN AN ORGANIZED HEALTH CARE EDUCATION/TRAINING PROGRAM

## 2021-05-27 PROCEDURE — A9270 NON-COVERED ITEM OR SERVICE: HCPCS | Performed by: NURSE PRACTITIONER

## 2021-05-27 PROCEDURE — 700102 HCHG RX REV CODE 250 W/ 637 OVERRIDE(OP): Performed by: STUDENT IN AN ORGANIZED HEALTH CARE EDUCATION/TRAINING PROGRAM

## 2021-05-27 PROCEDURE — 700111 HCHG RX REV CODE 636 W/ 250 OVERRIDE (IP): Performed by: ANESTHESIOLOGY

## 2021-05-27 PROCEDURE — A9270 NON-COVERED ITEM OR SERVICE: HCPCS | Performed by: SURGERY

## 2021-05-27 PROCEDURE — A9270 NON-COVERED ITEM OR SERVICE: HCPCS | Performed by: ANESTHESIOLOGY

## 2021-05-27 PROCEDURE — 501838 HCHG SUTURE GENERAL: Performed by: SURGERY

## 2021-05-27 PROCEDURE — 88304 TISSUE EXAM BY PATHOLOGIST: CPT | Mod: 59

## 2021-05-27 PROCEDURE — 160035 HCHG PACU - 1ST 60 MINS PHASE I: Performed by: SURGERY

## 2021-05-27 PROCEDURE — 700102 HCHG RX REV CODE 250 W/ 637 OVERRIDE(OP): Performed by: ANESTHESIOLOGY

## 2021-05-27 PROCEDURE — 700102 HCHG RX REV CODE 250 W/ 637 OVERRIDE(OP): Performed by: SURGERY

## 2021-05-27 PROCEDURE — 82962 GLUCOSE BLOOD TEST: CPT

## 2021-05-27 PROCEDURE — A4340 INDWELLING CATHETER SPECIAL: HCPCS | Performed by: SURGERY

## 2021-05-27 PROCEDURE — 160048 HCHG OR STATISTICAL LEVEL 1-5: Performed by: SURGERY

## 2021-05-27 PROCEDURE — 500187 HCHG CATH, COUNCIL TIP 16FR: Performed by: SURGERY

## 2021-05-27 PROCEDURE — 160036 HCHG PACU - EA ADDL 30 MINS PHASE I: Performed by: SURGERY

## 2021-05-27 PROCEDURE — 99232 SBSQ HOSP IP/OBS MODERATE 35: CPT | Performed by: INTERNAL MEDICINE

## 2021-05-27 PROCEDURE — 700111 HCHG RX REV CODE 636 W/ 250 OVERRIDE (IP): Performed by: STUDENT IN AN ORGANIZED HEALTH CARE EDUCATION/TRAINING PROGRAM

## 2021-05-27 PROCEDURE — A9270 NON-COVERED ITEM OR SERVICE: HCPCS | Performed by: HOSPITALIST

## 2021-05-27 PROCEDURE — 700105 HCHG RX REV CODE 258: Performed by: SURGERY

## 2021-05-27 PROCEDURE — 501435 HCHG STAPLER, LINEAR 60: Performed by: SURGERY

## 2021-05-27 PROCEDURE — 501460 HCHG STAPLER, TA55 35LD: Performed by: SURGERY

## 2021-05-27 PROCEDURE — 160009 HCHG ANES TIME/MIN: Performed by: SURGERY

## 2021-05-27 PROCEDURE — 700102 HCHG RX REV CODE 250 W/ 637 OVERRIDE(OP): Performed by: HOSPITALIST

## 2021-05-27 PROCEDURE — 160042 HCHG SURGERY MINUTES - EA ADDL 1 MIN LEVEL 5: Performed by: SURGERY

## 2021-05-27 PROCEDURE — 700105 HCHG RX REV CODE 258: Performed by: ANESTHESIOLOGY

## 2021-05-27 PROCEDURE — 160002 HCHG RECOVERY MINUTES (STAT): Performed by: SURGERY

## 2021-05-27 PROCEDURE — A4338 INDWELLING CATHETER LATEX: HCPCS | Performed by: SURGERY

## 2021-05-27 PROCEDURE — 160031 HCHG SURGERY MINUTES - 1ST 30 MINS LEVEL 5: Performed by: SURGERY

## 2021-05-27 PROCEDURE — 700102 HCHG RX REV CODE 250 W/ 637 OVERRIDE(OP): Performed by: NURSE PRACTITIONER

## 2021-05-27 PROCEDURE — 700101 HCHG RX REV CODE 250: Performed by: ANESTHESIOLOGY

## 2021-05-27 PROCEDURE — 501433 HCHG STAPLER, GIA MULTIFIRE 60/80: Performed by: SURGERY

## 2021-05-27 PROCEDURE — 0DSL0ZZ REPOSITION TRANSVERSE COLON, OPEN APPROACH: ICD-10-PCS | Performed by: SURGERY

## 2021-05-27 PROCEDURE — 770001 HCHG ROOM/CARE - MED/SURG/GYN PRIV*

## 2021-05-27 RX ORDER — HYDROMORPHONE HYDROCHLORIDE 1 MG/ML
0.1 INJECTION, SOLUTION INTRAMUSCULAR; INTRAVENOUS; SUBCUTANEOUS
Status: DISCONTINUED | OUTPATIENT
Start: 2021-05-27 | End: 2021-05-27 | Stop reason: HOSPADM

## 2021-05-27 RX ORDER — OXYCODONE HYDROCHLORIDE AND ACETAMINOPHEN 5; 325 MG/1; MG/1
2 TABLET ORAL
Status: COMPLETED | OUTPATIENT
Start: 2021-05-27 | End: 2021-05-27

## 2021-05-27 RX ORDER — DIPHENHYDRAMINE HYDROCHLORIDE 50 MG/ML
12.5 INJECTION INTRAMUSCULAR; INTRAVENOUS
Status: DISCONTINUED | OUTPATIENT
Start: 2021-05-27 | End: 2021-05-27 | Stop reason: HOSPADM

## 2021-05-27 RX ORDER — SODIUM CHLORIDE, SODIUM LACTATE, POTASSIUM CHLORIDE, CALCIUM CHLORIDE 600; 310; 30; 20 MG/100ML; MG/100ML; MG/100ML; MG/100ML
INJECTION, SOLUTION INTRAVENOUS
Status: DISCONTINUED | OUTPATIENT
Start: 2021-05-27 | End: 2021-05-27 | Stop reason: SURG

## 2021-05-27 RX ORDER — OXYCODONE HYDROCHLORIDE 5 MG/1
5-10 TABLET ORAL
Status: DISCONTINUED | OUTPATIENT
Start: 2021-05-27 | End: 2021-07-09 | Stop reason: HOSPADM

## 2021-05-27 RX ORDER — SODIUM CHLORIDE, SODIUM LACTATE, POTASSIUM CHLORIDE, CALCIUM CHLORIDE 600; 310; 30; 20 MG/100ML; MG/100ML; MG/100ML; MG/100ML
INJECTION, SOLUTION INTRAVENOUS CONTINUOUS
Status: DISCONTINUED | OUTPATIENT
Start: 2021-05-27 | End: 2021-05-28

## 2021-05-27 RX ORDER — OXYCODONE HYDROCHLORIDE AND ACETAMINOPHEN 5; 325 MG/1; MG/1
1 TABLET ORAL
Status: COMPLETED | OUTPATIENT
Start: 2021-05-27 | End: 2021-05-27

## 2021-05-27 RX ORDER — MEPERIDINE HYDROCHLORIDE 25 MG/ML
6.25 INJECTION INTRAMUSCULAR; INTRAVENOUS; SUBCUTANEOUS
Status: DISCONTINUED | OUTPATIENT
Start: 2021-05-27 | End: 2021-05-27 | Stop reason: HOSPADM

## 2021-05-27 RX ORDER — PHENYLEPHRINE HCL IN 0.9% NACL 0.5 MG/5ML
SYRINGE (ML) INTRAVENOUS PRN
Status: DISCONTINUED | OUTPATIENT
Start: 2021-05-27 | End: 2021-05-27 | Stop reason: SURG

## 2021-05-27 RX ORDER — HYDROMORPHONE HYDROCHLORIDE 1 MG/ML
0.5 INJECTION, SOLUTION INTRAMUSCULAR; INTRAVENOUS; SUBCUTANEOUS
Status: DISCONTINUED | OUTPATIENT
Start: 2021-05-27 | End: 2021-06-08

## 2021-05-27 RX ORDER — HALOPERIDOL 5 MG/ML
1 INJECTION INTRAMUSCULAR
Status: DISCONTINUED | OUTPATIENT
Start: 2021-05-27 | End: 2021-05-27 | Stop reason: HOSPADM

## 2021-05-27 RX ORDER — HYDROMORPHONE HYDROCHLORIDE 1 MG/ML
0.4 INJECTION, SOLUTION INTRAMUSCULAR; INTRAVENOUS; SUBCUTANEOUS
Status: DISCONTINUED | OUTPATIENT
Start: 2021-05-27 | End: 2021-05-27 | Stop reason: HOSPADM

## 2021-05-27 RX ORDER — ROCURONIUM BROMIDE 10 MG/ML
INJECTION, SOLUTION INTRAVENOUS PRN
Status: DISCONTINUED | OUTPATIENT
Start: 2021-05-27 | End: 2021-05-27 | Stop reason: SURG

## 2021-05-27 RX ORDER — CEFOTETAN DISODIUM 2 G/20ML
INJECTION, POWDER, FOR SOLUTION INTRAMUSCULAR; INTRAVENOUS PRN
Status: DISCONTINUED | OUTPATIENT
Start: 2021-05-27 | End: 2021-05-27 | Stop reason: SURG

## 2021-05-27 RX ORDER — SODIUM CHLORIDE, SODIUM LACTATE, POTASSIUM CHLORIDE, CALCIUM CHLORIDE 600; 310; 30; 20 MG/100ML; MG/100ML; MG/100ML; MG/100ML
INJECTION, SOLUTION INTRAVENOUS CONTINUOUS
Status: DISCONTINUED | OUTPATIENT
Start: 2021-05-27 | End: 2021-05-27

## 2021-05-27 RX ORDER — MIDAZOLAM HYDROCHLORIDE 1 MG/ML
INJECTION INTRAMUSCULAR; INTRAVENOUS PRN
Status: DISCONTINUED | OUTPATIENT
Start: 2021-05-27 | End: 2021-05-27 | Stop reason: SURG

## 2021-05-27 RX ORDER — ACETAMINOPHEN 325 MG/1
650 TABLET ORAL EVERY 6 HOURS
Status: DISPENSED | OUTPATIENT
Start: 2021-05-27 | End: 2021-05-30

## 2021-05-27 RX ORDER — LIDOCAINE HYDROCHLORIDE 20 MG/ML
INJECTION, SOLUTION EPIDURAL; INFILTRATION; INTRACAUDAL; PERINEURAL PRN
Status: DISCONTINUED | OUTPATIENT
Start: 2021-05-27 | End: 2021-05-27 | Stop reason: SURG

## 2021-05-27 RX ORDER — ONDANSETRON 2 MG/ML
4 INJECTION INTRAMUSCULAR; INTRAVENOUS
Status: COMPLETED | OUTPATIENT
Start: 2021-05-27 | End: 2021-05-27

## 2021-05-27 RX ORDER — LORAZEPAM 2 MG/ML
0.5 INJECTION INTRAMUSCULAR
Status: DISCONTINUED | OUTPATIENT
Start: 2021-05-27 | End: 2021-05-27 | Stop reason: HOSPADM

## 2021-05-27 RX ORDER — HYDROMORPHONE HYDROCHLORIDE 1 MG/ML
0.2 INJECTION, SOLUTION INTRAMUSCULAR; INTRAVENOUS; SUBCUTANEOUS
Status: DISCONTINUED | OUTPATIENT
Start: 2021-05-27 | End: 2021-05-27 | Stop reason: HOSPADM

## 2021-05-27 RX ADMIN — NEOMYCIN SULFATE 500 MG: 500 TABLET ORAL at 21:58

## 2021-05-27 RX ADMIN — DOCUSATE SODIUM 50 MG AND SENNOSIDES 8.6 MG 2 TABLET: 8.6; 5 TABLET, FILM COATED ORAL at 05:17

## 2021-05-27 RX ADMIN — CHLORTHALIDONE 25 MG: 25 TABLET ORAL at 05:19

## 2021-05-27 RX ADMIN — Medication 100 MCG: at 14:30

## 2021-05-27 RX ADMIN — LIDOCAINE HYDROCHLORIDE 100 MG: 20 INJECTION, SOLUTION EPIDURAL; INFILTRATION; INTRACAUDAL at 14:05

## 2021-05-27 RX ADMIN — POLYETHYLENE GLYCOL 3350 1 PACKET: 17 POWDER, FOR SOLUTION ORAL at 05:18

## 2021-05-27 RX ADMIN — ATORVASTATIN CALCIUM 40 MG: 40 TABLET, FILM COATED ORAL at 21:58

## 2021-05-27 RX ADMIN — FENTANYL CITRATE 50 MCG: 50 INJECTION, SOLUTION INTRAMUSCULAR; INTRAVENOUS at 15:00

## 2021-05-27 RX ADMIN — ZIPRASIDONE HYDROCHLORIDE 60 MG: 60 CAPSULE ORAL at 08:51

## 2021-05-27 RX ADMIN — ROCURONIUM BROMIDE 20 MG: 10 INJECTION, SOLUTION INTRAVENOUS at 15:00

## 2021-05-27 RX ADMIN — METOPROLOL SUCCINATE 12.5 MG: 25 TABLET, EXTENDED RELEASE ORAL at 05:17

## 2021-05-27 RX ADMIN — FENTANYL CITRATE 50 MCG: 50 INJECTION, SOLUTION INTRAMUSCULAR; INTRAVENOUS at 17:00

## 2021-05-27 RX ADMIN — CLONAZEPAM 0.5 MG: 0.5 TABLET ORAL at 08:51

## 2021-05-27 RX ADMIN — LEVOTHYROXINE SODIUM 112 MCG: 0.11 TABLET ORAL at 05:17

## 2021-05-27 RX ADMIN — FLUDROCORTISONE ACETATE 0.1 MG: 0.1 TABLET ORAL at 05:17

## 2021-05-27 RX ADMIN — ACETAMINOPHEN 650 MG: 325 TABLET, FILM COATED ORAL at 20:05

## 2021-05-27 RX ADMIN — FENTANYL CITRATE 50 MCG: 50 INJECTION, SOLUTION INTRAMUSCULAR; INTRAVENOUS at 15:45

## 2021-05-27 RX ADMIN — SODIUM CHLORIDE, POTASSIUM CHLORIDE, SODIUM LACTATE AND CALCIUM CHLORIDE: 600; 310; 30; 20 INJECTION, SOLUTION INTRAVENOUS at 20:29

## 2021-05-27 RX ADMIN — ROCURONIUM BROMIDE 50 MG: 10 INJECTION, SOLUTION INTRAVENOUS at 14:05

## 2021-05-27 RX ADMIN — OXYCODONE 10 MG: 5 TABLET ORAL at 23:36

## 2021-05-27 RX ADMIN — CLONAZEPAM 1 MG: 1 TABLET ORAL at 21:56

## 2021-05-27 RX ADMIN — SODIUM CHLORIDE, POTASSIUM CHLORIDE, SODIUM LACTATE AND CALCIUM CHLORIDE: 600; 310; 30; 20 INJECTION, SOLUTION INTRAVENOUS at 14:00

## 2021-05-27 RX ADMIN — PAROXETINE HYDROCHLORIDE 20 MG: 20 TABLET, FILM COATED ORAL at 08:51

## 2021-05-27 RX ADMIN — OXYCODONE 10 MG: 5 TABLET ORAL at 20:04

## 2021-05-27 RX ADMIN — METOCLOPRAMIDE 10 MG: 5 INJECTION, SOLUTION INTRAMUSCULAR; INTRAVENOUS at 20:23

## 2021-05-27 RX ADMIN — NEOMYCIN SULFATE 500 MG: 500 TABLET ORAL at 05:19

## 2021-05-27 RX ADMIN — FENTANYL CITRATE 50 MCG: 50 INJECTION, SOLUTION INTRAMUSCULAR; INTRAVENOUS at 16:50

## 2021-05-27 RX ADMIN — OXYCODONE HYDROCHLORIDE AND ACETAMINOPHEN 2 TABLET: 5; 325 TABLET ORAL at 16:50

## 2021-05-27 RX ADMIN — POTASSIUM CHLORIDE 20 MEQ: 20 TABLET, EXTENDED RELEASE ORAL at 05:18

## 2021-05-27 RX ADMIN — FENTANYL CITRATE 100 MCG: 50 INJECTION, SOLUTION INTRAMUSCULAR; INTRAVENOUS at 14:05

## 2021-05-27 RX ADMIN — ZIPRASIDONE HYDROCHLORIDE 60 MG: 60 CAPSULE ORAL at 21:58

## 2021-05-27 RX ADMIN — PROPOFOL 200 MG: 10 INJECTION, EMULSION INTRAVENOUS at 14:05

## 2021-05-27 RX ADMIN — ONDANSETRON 4 MG: 2 INJECTION INTRAMUSCULAR; INTRAVENOUS at 16:51

## 2021-05-27 RX ADMIN — HYDROCORTISONE 10 MG: 10 TABLET ORAL at 05:19

## 2021-05-27 RX ADMIN — METRONIDAZOLE 500 MG: 500 TABLET ORAL at 21:56

## 2021-05-27 RX ADMIN — POTASSIUM CHLORIDE, DEXTROSE MONOHYDRATE AND SODIUM CHLORIDE: 150; 5; 450 INJECTION, SOLUTION INTRAVENOUS at 10:46

## 2021-05-27 RX ADMIN — METRONIDAZOLE 500 MG: 500 TABLET ORAL at 05:17

## 2021-05-27 RX ADMIN — MIDAZOLAM HYDROCHLORIDE 2 MG: 1 INJECTION, SOLUTION INTRAMUSCULAR; INTRAVENOUS at 14:00

## 2021-05-27 RX ADMIN — Medication 100 MCG: at 14:27

## 2021-05-27 RX ADMIN — DIGOXIN 125 MCG: 125 TABLET ORAL at 21:57

## 2021-05-27 RX ADMIN — CEFOTETAN DISODIUM 2 G: 2 INJECTION, POWDER, FOR SOLUTION INTRAMUSCULAR; INTRAVENOUS at 14:13

## 2021-05-27 RX ADMIN — METRONIDAZOLE 500 MG: 500 INJECTION, SOLUTION INTRAVENOUS at 14:18

## 2021-05-27 ASSESSMENT — ENCOUNTER SYMPTOMS
CONSTIPATION: 0
HALLUCINATIONS: 0
DIARRHEA: 0
BACK PAIN: 0
CHILLS: 0
PALPITATIONS: 0
COUGH: 0
NAUSEA: 0
DIZZINESS: 0
HEADACHES: 0
FEVER: 0
VOMITING: 0
SHORTNESS OF BREATH: 0
ABDOMINAL PAIN: 0

## 2021-05-27 ASSESSMENT — PAIN DESCRIPTION - PAIN TYPE
TYPE: SURGICAL PAIN
TYPE: ACUTE PAIN;SURGICAL PAIN
TYPE: SURGICAL PAIN
TYPE: SURGICAL PAIN;ACUTE PAIN
TYPE: SURGICAL PAIN
TYPE: SURGICAL PAIN

## 2021-05-27 ASSESSMENT — FIBROSIS 4 INDEX: FIB4 SCORE: 1.2

## 2021-05-27 NOTE — CARE PLAN
The patient is: Stable - Low risk of patient condition declining or worsening     Progress made toward(s) clinical / shift goals: Patient more participatory in self-care including ostomy care.     Patient is not progressing towards the following goals:  Patient remains suicide risk; telesitter monitoring in place.          Problem: Knowledge Deficit - Standard  Goal: Patient and family/care givers will demonstrate understanding of plan of care, disease process/condition, diagnostic tests and medications  Outcome: Progressing     Problem: Depression  Goal: Patient and family/caregiver will verbalize accurate information about at least two of the possible causes of depression, three-four of the signs and symptoms of depression  Outcome: Progressing     Problem: Provide Safe Environment  Goal: Suicide environmental safety, protocols, policies, and practices will be implemented  Outcome: Progressing     Problem: Psychosocial  Goal: Patient's ability to identify and develop effective coping behaviors will improve  Outcome: Progressing  Goal: Patient's ability to identify and utilize available support systems will improve  Outcome: Progressing     Problem: Hemodynamics  Goal: Patient's hemodynamics, fluid balance and neurologic status will be stable or improve  Outcome: Progressing     Problem: Fluid Volume  Goal: Fluid volume balance will be maintained  Outcome: Progressing     Problem: Urinary - Renal Perfusion  Goal: Ability to achieve and maintain adequate renal perfusion and functioning will improve  Outcome: Progressing     Problem: Respiratory  Goal: Patient will achieve/maintain optimum respiratory ventilation and gas exchange  Outcome: Progressing     Problem: Physical Regulation  Goal: Diagnostic test results will improve  Outcome: Progressing  Goal: Signs and symptoms of infection will decrease  Outcome: Progressing     Problem: Psychosocial  Goal: Patient's ability to re-evaluate and adapt role  responsibilities will improve  Outcome: Progressing     Problem: Bowel Elimination  Goal: Establish and maintain regular bowel function  Outcome: Progressing     Problem: Fall Risk  Goal: Patient will remain free from falls  Outcome: Progressing

## 2021-05-27 NOTE — DISCHARGE PLANNING
Anticipated Discharge Disposition:   Inpatient Psych    Action:   Discussed discharge planning needs during rounds. Per MD, plan for surgery today.     RN CM did not receive email form payee service with new client packet. RN CM called  [facility] ([phone]). No answer, left voicemail for call back.    Barriers to Discharge:   Medical clearance  Inpatient psych acceptance and bed availability    Plan:   Hospital Care Management will continue to follow and assist with discharge planning needs.

## 2021-05-27 NOTE — ANESTHESIA TIME REPORT
Anesthesia Start and Stop Event Times     Date Time Event    5/27/2021 1347 Ready for Procedure     1400 Anesthesia Start     1648 Anesthesia Stop        Responsible Staff  05/27/21    Name Role Begin End    Carmela Sarmiento M.D. Anesth 1400 1648        Preop Diagnosis (Free Text):  Pre-op Diagnosis     colostomy status        Preop Diagnosis (Codes):    Post op Diagnosis  Colostomy in place (HCC)      Premium Reason  Non-Premium    Comments:

## 2021-05-27 NOTE — ANESTHESIA PROCEDURE NOTES
Airway    Date/Time: 5/27/2021 2:06 PM  Performed by: Carmela Sarmiento M.D.  Authorized by: Carmela Sarmiento M.D.     Location:  OR  Urgency:  Elective  Difficult Airway: No    Indications for Airway Management:  Anesthesia      Spontaneous Ventilation: absent    Sedation Level:  Deep  Preoxygenated: Yes    Patient Position:  Sniffing  Mask Difficulty Assessment:  1 - vent by mask  Final Airway Type:  Endotracheal airway  Final Endotracheal Airway:  ETT  Cuffed: Yes    Technique Used for Successful ETT Placement:  Direct laryngoscopy    Insertion Site:  Oral  Blade Type:  Tariq  Laryngoscope Blade/Videolaryngoscope Blade Size:  3  ETT Size (mm):  8.0  Measured from:  Teeth  ETT to Teeth (cm):  22  Placement Verified by: auscultation and capnometry    Cormack-Lehane Classification:  Grade I - full view of glottis  Number of Attempts at Approach:  1  Number of Other Approaches Attempted:  0

## 2021-05-27 NOTE — DISCHARGE PLANNING
Received Stipulation and Order from the court continuing pt's legal hold until 6/3. Sent copy to ELISA Sutton, scanned copy into media manager.

## 2021-05-27 NOTE — PROGRESS NOTES
Surgery    No acute changes over night  Sched for elective colostomy takedown  Consent on chart    Recent Labs     05/25/21  1513   WBC 11.6*   RBC 5.31   HEMOGLOBIN 15.2   HEMATOCRIT 47.2   MCV 88.9   MCH 28.6   RDW 46.1   PLATELETCT 234   MPV 11.2   NEUTSPOLYS 63.10   LYMPHOCYTES 26.50   MONOCYTES 5.40   EOSINOPHILS 4.30   BASOPHILS 0.40     Recent Labs     05/25/21  1513   SODIUM 138   POTASSIUM 3.8   CHLORIDE 97   CO2 30   GLUCOSE 121*   BUN 28*   CREATININE 1.09       Proceed with surgery as planned.

## 2021-05-27 NOTE — ANESTHESIA PREPROCEDURE EVALUATION
57yo male c afib, HTN, NIDDM, hypothyroid, depression for colostomy takedown    Relevant Problems   NEURO   (positive) Psychogenic nonepileptic seizure      CARDIAC   (positive) Chronic venous insufficiency of lower extremity   (positive) Paroxysmal atrial fibrillation (HCC)      ENDO   (positive) Diabetes mellitus type 2, insulin dependent (HCC)   (positive) Hypothyroidism       Physical Exam    Airway   Mallampati: II  TM distance: >3 FB  Neck ROM: full       Cardiovascular - normal exam  Rhythm: regular  Rate: normal  (-) murmur     Dental - normal exam  Comments: Many missing teeth but none loose           Pulmonary - normal exam  Breath sounds clear to auscultation     Abdominal    Neurological              Anesthesia Plan    ASA 3       Plan - general       Airway plan will be ETT          Induction: intravenous    Postoperative Plan: Postoperative administration of opioids is intended.    Pertinent diagnostic labs and testing reviewed    Informed Consent:    Anesthetic plan and risks discussed with patient.    Use of blood products discussed with: patient whom consented to blood products.

## 2021-05-27 NOTE — PROGRESS NOTES
[facility] Medicine Daily Progress Note    Date of Service  5/27/2021    Chief Complaint  56 y.o. male admitted 2/9/2021 with suicidal ideation    Hospital Course  Mr. Castro is a 56 year-old man with a past medical history significant for atrial fibrillation, secondary hypercoagulable state, diabetes mellitus complicated by diabetic neuropathy, chronic systolic CHF, colostomy placement, orthostatic hypotension (maintained on florinef), and dyslipidemia who presented to the ED on 2/9/2021 after he became dizzy and had a syncopal episode at home.  In the ED he was noted to be hypotensive and bradycardic. Cardiology was consulted and recommended stopping all AV sanket blocking medications.  He remained orthostatic during his hospitalization so midodrine was started and then transit to fludrocortisone bid. BP stabilized after.  An echocardiogram was obtained and showed mild mitral regurgitation and an EF of 60% which was improved from his previous echocardiogram obtained on 11/2020 (previously 35%).  CTAs of the head and neck were done and were unremarkable outside of distal bilateral ICA tortuosity.  Also during his hospitalization he expressed desire to kill himself. Psychiatry consulted, medication adjusted and legal hold placed.   He has a colostomy and has become overwhelmed by caring for it. He is now pending [facility] placement at the 6-month db for maturity of his colostomy. And he is medically cleared for psychaittric inpatient treatment.    Interval Problem Update  5/25-patient seen and evaluated today.  Patient stated he did not have any suicidal ideation today.  Informed him of the possible surgery to be done on Thursday.  He is in current agreement with surgery.  Explained to him we will need to do a cardiac work-up which he is in agreement with.  Pending ECG, echo, laboratory studies, Covid check.    5/26- patient stated he was doing well, denied SI.  He is aware of his needed bowel prep and stated he will  completed and keep NPO at midnight.   Echo shows LVEF 55%, RVSP 25mmHg.    5/27-patient seen evaluated before surgery.  Patient denied any SI and was eager for his surgery.  Patient had no acute complaints today.    Consultants/Specialty  Cardiology  Psychiatry  General surgery    Code Status  Full Code    Disposition  Pending surgery today.  If patient is no longer in suicidal ideation after surgery, he may be discharged to SNF.    Review of Systems  Review of Systems   Constitutional: Negative for chills, fever and malaise/fatigue.   Respiratory: Negative for cough and shortness of breath.    Cardiovascular: Negative for chest pain and palpitations.   Gastrointestinal: Negative for abdominal pain, constipation, diarrhea, nausea and vomiting.   Musculoskeletal: Negative for back pain and joint pain.   Neurological: Negative for dizziness and headaches.   Psychiatric/Behavioral: Negative for hallucinations and suicidal ideas.      Physical Exam  Temp:  [36.2 °C (97.1 °F)-36.6 °C (97.9 °F)] 36.2 °C (97.2 °F)  Pulse:  [92-98] 92  Resp:  [17-20] 18  BP: (111-132)/(46-95) 132/78  SpO2:  [92 %-97 %] 97 %    Physical Exam  Vitals and nursing note reviewed.   Constitutional:       General: He is not in acute distress.     Appearance: Normal appearance. He is not diaphoretic.   Cardiovascular:      Rate and Rhythm: Normal rate and regular rhythm.      Pulses: Normal pulses.      Heart sounds: Normal heart sounds. No murmur heard.     Pulmonary:      Effort: Pulmonary effort is normal. No respiratory distress.      Breath sounds: Normal breath sounds. No wheezing or rales.   Abdominal:      General: Abdomen is flat. Bowel sounds are normal. There is no distension.      Palpations: Abdomen is soft.      Tenderness: There is no abdominal tenderness.      Comments: Colostomy bag in place   Musculoskeletal:         General: No swelling or tenderness. Normal range of motion.      Right lower leg: Edema present.      Left lower  leg: Edema present.   Skin:     General: Skin is warm.      Capillary Refill: Capillary refill takes less than 2 seconds.      Coloration: Skin is not jaundiced or pale.   Neurological:      General: No focal deficit present.      Mental Status: He is alert and oriented to person, place, and time. Mental status is at baseline.   Psychiatric:         Mood and Affect: Mood normal.         Behavior: Behavior normal.         Thought Content: Thought content normal.         Judgment: Judgment normal.       Fluids    Intake/Output Summary (Last 24 hours) at 5/27/2021 1430  Last data filed at 5/27/2021 0700  Gross per 24 hour   Intake 240 ml   Output 1100 ml   Net -860 ml       Laboratory  Recent Labs     05/25/21  1513   WBC 11.6*   RBC 5.31   HEMOGLOBIN 15.2   HEMATOCRIT 47.2   MCV 88.9   MCH 28.6   MCHC 32.2*   RDW 46.1   PLATELETCT 234   MPV 11.2     Recent Labs     05/25/21  1513   SODIUM 138   POTASSIUM 3.8   CHLORIDE 97   CO2 30   GLUCOSE 121*   BUN 28*   CREATININE 1.09   CALCIUM 9.7     Recent Labs     05/25/21  1513   APTT 33.8   INR 1.09               Imaging  EC-ECHOCARDIOGRAM COMPLETE W/ CONT   Final Result      DX-CHEST-PORTABLE (1 VIEW)   Final Result      1.  Minor asymmetric interstitial opacity in the right lower lobe which could represent minor interstitial edema, pneumonia, or fibrotic change.      2.  No left lung consolidation.      3.  No evidence of congestive failure.      [id number] VIEW   Final Result         No specific finding to suggest small bowel obstruction.      TK-RIAZFCB-0 VIEW   Final Result         No significant interval change.      DX-CHEST-LIMITED (1 VIEW)   Final Result         Diffuse interstitial prominence could relate to mild pulmonary edema or atypical infection      DT-[id number]-2 VIEW   Final Result      Increased colonic gas without definite bowel obstruction.      US-RENAL   Final Result      No evidence of hydronephrosis.      Lobulated kidneys bilaterally.     "  CT-ABDOMEN-PELVIS WITH   Final Result      1.  There is no evidence of small bowel obstruction.   2.  There is a left lower quadrant ostomy.   3.  There is fluid distention of the colon distal to the surgical material in the left mid descending colon extending all the way to the rectum. There is no pneumatosis or free air.   4.  There is cholelithiasis without biliary dilatation.   5.  There has been interval removal of the drainage catheter anterior to the spleen with minimal hypodense area in the anterior spleen again noted. There is no new fluid collection.      IR-US GUIDED PIV   Final Result    Ultrasound-guided PERIPHERAL IV INSERTION performed by    qualified nursing staff as above.      EC-ECHOCARDIOGRAM COMPLETE W/O CONT   Final Result      DX-LUMBAR SPINE-2 OR 3 VIEWS   Final Result      Moderate compression deformity of T11 is new compared to 2018.      Mild wedge deformity of T12 is unchanged.      Degenerative changes including facet arthropathy.      Mild retrolisthesis of L5 on S1 and L3 on L4.              Assessment/Plan  * Suicidal ideation- (present on admission)  Assessment & Plan  -Has history of MDD, recurrent with psychotic features. active SI w/ plan on admission  -Psychiatry following, recommended discontinuation of civil commitment - suspect possible malingering or secondary gain   - Evaluated bedside again and discussed with patient who states he \"wants to live\"   -Psych recommending sitter to ensure patient does not attempt actions that would extend his stay   5/5: Patient reports that he is telling staff that his suicidal ideations because he would like attention and not because he is actually having suicidal ideations.  5/10: placed on legal hold.  Patient is still on legal hold , pending placement to inpatient psych facility  Will need reevaluation after surgery    Colostomy present (HCC)- (present on admission)  Assessment & Plan  History of fall at home Nov 2020 on left side with " perforated colon and splenic fluid collection/hematoma s/p segmental colectomy, colostomy, mobilization of the splenic flexure, wound vac, and I&D Nov 2020.  -Wound care consulted for ostomy care    Pending surgery today with Dr. Lyly JACINTO cardiac risk: 0.5% (Risk of MI or cardiac arrest, intraoperatively or up to 30 days post-op).  OPHELIA respiratory risk: 4.2% (Risk of mechanical ventalation for > 48 hours post-op or reintubation in 30 days)  Revised Cardiac Risk Index: 10.1% (30 day risk of death, MI or cardiac arrest)  ECG and Echo are showing low risks for surgery.  Echo shows LVEF 55%, RVSP 25mmHg.  Patient appears to be a LOW risk for vj-operative complications    Obstructive uropathy- (present on admission)  Assessment & Plan  -Trial off of flomax given his hypotension.  Discontinued 4/6/2021. No retention continue off Flomax    Paroxysmal atrial fibrillation (HCC)- (present on admission)  Assessment & Plan  -CHADSVASC 3 (HTN, CHF, DM).   -TTE Feb 2021: EF 60%.  -Continue metoprolol, digoxin, and eliquis.    Chronic heart failure with preserved ejection fraction (HCC)- (present on admission)  Assessment & Plan  -Now stable.  -Pulmonary edema noted in mid-March, was placed on IV diuresis though that was held when he developed a gram-negative gina bacteremia and sepsis.  Currently doing well from a respiratory standpoint, no shortness of breath, lungs clear to auscultation bilaterally, not requiring oxygen supplementation.  -Repeat echocardiogram showed improvement in his ejection fraction to 60% 02/21.  -Continue metoprolol succinate.  -Lisinopril held per cardiology recommendations due to hypotension.  ECG and Echo are showing low risks for surgery.  Echo shows LVEF 55%, RVSP 25mmHg.    Major depressive disorder, recurrent, severe with psychotic features (HCC)- (present on admission)  Assessment & Plan  -Management as listed under 'anxiety and depression'.    Anxiety and depression- (present on  admission)  Assessment & Plan  -Legal hold, awaiting acceptance at psych facility ([facility] will take in May).  -Continue paxil, klonopin & geodon.     Adrenal insufficiency (HCC)- (present on admission)  Assessment & Plan  -Started PO hydrocortisone 4/7/2021. Continue florinef.   -Needs outpatient endocrinology follow up.  -Monitor potassium while on florinef. 4.4 on 4/7/2021.  -Repeat orthostatics periodically if symptomatic     Debility- (present on admission)  Assessment & Plan  -Cleared by PT/OT.  -Out of bed for each meal and ambulate in the hallways at least twice per shift.    Orthostatic hypotension- (present on admission)  Assessment & Plan  -Multifactorial including adrenal insufficiency, medications (beta blocker, CCB, tamsulosin), diabetic autonomic dysfunction, and venous insufficiency.  -EF is noted to be 60%, significant improvement from prior (previously 35%).  -Per cardiology, no further cardiac work-up needed.  Recommends follow-up with them as outpatient.  -Fall precautions.  -Continue to educate on the importance of slow positional changes.   - Started PO hydrocortisone 4/7/2021 for AI. Continuing florinef.  Will need taper as outpatient.  -Decreased toprolol which allowed for discontinuation of midodrine  Improved     Chronic venous insufficiency of lower extremity- (present on admission)  Assessment & Plan  -Continue compression stockings.    Diabetes mellitus type 2, insulin dependent (HCC)- (present on admission)  Assessment & Plan  -A1c 5.2% on 2/14/2021.  -Continue diabetic diet.  -Continue lantus at current dose   -Hypoglycemia protocol in place if needed.    Hypothyroidism- (present on admission)  Assessment & Plan  -TFTs in Feb were WNL. Continue levothyroxine at current dose.     Mixed hyperlipidemia- (present on admission)  Assessment & Plan  -Continue atorvastatin.     Lower limb amputation, great toe (HCC)- (present on admission)  Assessment & Plan  -History of, 2018.  Site  well-healed.    Class 2 severe obesity with serious comorbidity and body mass index (BMI) of 35.0 to 35.9 in adult (HCC)- (present on admission)  Assessment & Plan  Body mass index is 35.19 kg/m².  -Could benefit from outpatient weight loss counseling which can be arranged through his PCP after hospital discharge.     VTE prophylaxis: Discontinuing Eliquis at this time, Lovenox and SCDs, hold at midnight.

## 2021-05-28 PROBLEM — E11.65 TYPE 2 DIABETES MELLITUS WITH HYPERGLYCEMIA, WITH LONG-TERM CURRENT USE OF INSULIN (HCC): Status: ACTIVE | Noted: 2021-02-13

## 2021-05-28 PROBLEM — N39.0 UTI (URINARY TRACT INFECTION): Status: ACTIVE | Noted: 2021-05-28

## 2021-05-28 PROBLEM — I48.21 PERMANENT ATRIAL FIBRILLATION (HCC): Status: ACTIVE | Noted: 2021-02-13

## 2021-05-28 PROBLEM — D72.829 LEUKOCYTOSIS: Status: ACTIVE | Noted: 2021-05-28

## 2021-05-28 LAB
ALBUMIN SERPL BCP-MCNC: 3.2 G/DL (ref 3.2–4.9)
ALBUMIN/GLOB SERPL: 0.9 G/DL
ALP SERPL-CCNC: 106 U/L (ref 30–99)
ALT SERPL-CCNC: 37 U/L (ref 2–50)
ANION GAP SERPL CALC-SCNC: 10 MMOL/L (ref 7–16)
AST SERPL-CCNC: 29 U/L (ref 12–45)
BASOPHILS # BLD AUTO: 0.4 % (ref 0–1.8)
BASOPHILS # BLD: 0.07 K/UL (ref 0–0.12)
BILIRUB SERPL-MCNC: 0.7 MG/DL (ref 0.1–1.5)
BUN SERPL-MCNC: 23 MG/DL (ref 8–22)
CALCIUM SERPL-MCNC: 8.9 MG/DL (ref 8.5–10.5)
CHLORIDE SERPL-SCNC: 100 MMOL/L (ref 96–112)
CO2 SERPL-SCNC: 24 MMOL/L (ref 20–33)
CREAT SERPL-MCNC: 1.24 MG/DL (ref 0.5–1.4)
EOSINOPHIL # BLD AUTO: 0.14 K/UL (ref 0–0.51)
EOSINOPHIL NFR BLD: 0.8 % (ref 0–6.9)
ERYTHROCYTE [DISTWIDTH] IN BLOOD BY AUTOMATED COUNT: 47.5 FL (ref 35.9–50)
GLOBULIN SER CALC-MCNC: 3.4 G/DL (ref 1.9–3.5)
GLUCOSE SERPL-MCNC: 166 MG/DL (ref 65–99)
HCT VFR BLD AUTO: 47.6 % (ref 42–52)
HGB BLD-MCNC: 15.4 G/DL (ref 14–18)
IMM GRANULOCYTES # BLD AUTO: 0.1 K/UL (ref 0–0.11)
IMM GRANULOCYTES NFR BLD AUTO: 0.6 % (ref 0–0.9)
LYMPHOCYTES # BLD AUTO: 1.37 K/UL (ref 1–4.8)
LYMPHOCYTES NFR BLD: 7.6 % (ref 22–41)
MAGNESIUM SERPL-MCNC: 1.4 MG/DL (ref 1.5–2.5)
MCH RBC QN AUTO: 28.9 PG (ref 27–33)
MCHC RBC AUTO-ENTMCNC: 32.4 G/DL (ref 33.7–35.3)
MCV RBC AUTO: 89.5 FL (ref 81.4–97.8)
MONOCYTES # BLD AUTO: 1.08 K/UL (ref 0–0.85)
MONOCYTES NFR BLD AUTO: 6 % (ref 0–13.4)
NEUTROPHILS # BLD AUTO: 15.15 K/UL (ref 1.82–7.42)
NEUTROPHILS NFR BLD: 84.6 % (ref 44–72)
NRBC # BLD AUTO: 0 K/UL
NRBC BLD-RTO: 0 /100 WBC
PATHOLOGY CONSULT NOTE: NORMAL
PHOSPHATE SERPL-MCNC: 5.7 MG/DL (ref 2.5–4.5)
PLATELET # BLD AUTO: 236 K/UL (ref 164–446)
PMV BLD AUTO: 10.7 FL (ref 9–12.9)
POTASSIUM SERPL-SCNC: 4.5 MMOL/L (ref 3.6–5.5)
PROT SERPL-MCNC: 6.6 G/DL (ref 6–8.2)
RBC # BLD AUTO: 5.32 M/UL (ref 4.7–6.1)
SODIUM SERPL-SCNC: 134 MMOL/L (ref 135–145)
WBC # BLD AUTO: 17.9 K/UL (ref 4.8–10.8)

## 2021-05-28 PROCEDURE — A9270 NON-COVERED ITEM OR SERVICE: HCPCS | Performed by: NURSE PRACTITIONER

## 2021-05-28 PROCEDURE — 83735 ASSAY OF MAGNESIUM: CPT

## 2021-05-28 PROCEDURE — 36415 COLL VENOUS BLD VENIPUNCTURE: CPT

## 2021-05-28 PROCEDURE — 700111 HCHG RX REV CODE 636 W/ 250 OVERRIDE (IP): Performed by: SURGERY

## 2021-05-28 PROCEDURE — A9270 NON-COVERED ITEM OR SERVICE: HCPCS | Performed by: STUDENT IN AN ORGANIZED HEALTH CARE EDUCATION/TRAINING PROGRAM

## 2021-05-28 PROCEDURE — 770001 HCHG ROOM/CARE - MED/SURG/GYN PRIV*

## 2021-05-28 PROCEDURE — 700105 HCHG RX REV CODE 258: Performed by: SURGERY

## 2021-05-28 PROCEDURE — 700102 HCHG RX REV CODE 250 W/ 637 OVERRIDE(OP): Performed by: NURSE PRACTITIONER

## 2021-05-28 PROCEDURE — 700102 HCHG RX REV CODE 250 W/ 637 OVERRIDE(OP): Performed by: SURGERY

## 2021-05-28 PROCEDURE — 700102 HCHG RX REV CODE 250 W/ 637 OVERRIDE(OP): Performed by: STUDENT IN AN ORGANIZED HEALTH CARE EDUCATION/TRAINING PROGRAM

## 2021-05-28 PROCEDURE — 700105 HCHG RX REV CODE 258: Performed by: FAMILY MEDICINE

## 2021-05-28 PROCEDURE — 99232 SBSQ HOSP IP/OBS MODERATE 35: CPT | Performed by: FAMILY MEDICINE

## 2021-05-28 PROCEDURE — 700111 HCHG RX REV CODE 636 W/ 250 OVERRIDE (IP): Performed by: FAMILY MEDICINE

## 2021-05-28 PROCEDURE — 97164 PT RE-EVAL EST PLAN CARE: CPT

## 2021-05-28 PROCEDURE — A9270 NON-COVERED ITEM OR SERVICE: HCPCS | Performed by: HOSPITALIST

## 2021-05-28 PROCEDURE — 97168 OT RE-EVAL EST PLAN CARE: CPT

## 2021-05-28 PROCEDURE — 84100 ASSAY OF PHOSPHORUS: CPT

## 2021-05-28 PROCEDURE — 80053 COMPREHEN METABOLIC PANEL: CPT

## 2021-05-28 PROCEDURE — 700102 HCHG RX REV CODE 250 W/ 637 OVERRIDE(OP): Performed by: HOSPITALIST

## 2021-05-28 PROCEDURE — 85025 COMPLETE CBC W/AUTO DIFF WBC: CPT

## 2021-05-28 PROCEDURE — A9270 NON-COVERED ITEM OR SERVICE: HCPCS | Performed by: SURGERY

## 2021-05-28 RX ORDER — MAGNESIUM SULFATE HEPTAHYDRATE 40 MG/ML
2 INJECTION, SOLUTION INTRAVENOUS ONCE
Status: COMPLETED | OUTPATIENT
Start: 2021-05-28 | End: 2021-05-28

## 2021-05-28 RX ORDER — SODIUM CHLORIDE 9 MG/ML
500 INJECTION, SOLUTION INTRAVENOUS ONCE
Status: COMPLETED | OUTPATIENT
Start: 2021-05-28 | End: 2021-05-28

## 2021-05-28 RX ORDER — DOCUSATE SODIUM 100 MG/1
100 CAPSULE, LIQUID FILLED ORAL 2 TIMES DAILY
Status: DISCONTINUED | OUTPATIENT
Start: 2021-05-28 | End: 2021-06-15

## 2021-05-28 RX ORDER — DEXTROSE MONOHYDRATE, SODIUM CHLORIDE, AND POTASSIUM CHLORIDE 50; 1.49; 4.5 G/1000ML; G/1000ML; G/1000ML
INJECTION, SOLUTION INTRAVENOUS CONTINUOUS
Status: DISCONTINUED | OUTPATIENT
Start: 2021-05-28 | End: 2021-05-28

## 2021-05-28 RX ORDER — DEXTROSE AND SODIUM CHLORIDE 5; .9 G/100ML; G/100ML
INJECTION, SOLUTION INTRAVENOUS CONTINUOUS
Status: DISCONTINUED | OUTPATIENT
Start: 2021-05-28 | End: 2021-05-29

## 2021-05-28 RX ADMIN — OXYCODONE 10 MG: 5 TABLET ORAL at 22:16

## 2021-05-28 RX ADMIN — SODIUM CHLORIDE 500 ML: 9 INJECTION, SOLUTION INTRAVENOUS at 18:48

## 2021-05-28 RX ADMIN — ZIPRASIDONE HYDROCHLORIDE 60 MG: 60 CAPSULE ORAL at 10:17

## 2021-05-28 RX ADMIN — ACETAMINOPHEN 650 MG: 325 TABLET, FILM COATED ORAL at 23:53

## 2021-05-28 RX ADMIN — HYDROCORTISONE 10 MG: 10 TABLET ORAL at 05:05

## 2021-05-28 RX ADMIN — DEXTROSE AND SODIUM CHLORIDE: 5; 900 INJECTION, SOLUTION INTRAVENOUS at 14:17

## 2021-05-28 RX ADMIN — OXYCODONE 10 MG: 5 TABLET ORAL at 10:22

## 2021-05-28 RX ADMIN — CLONAZEPAM 0.5 MG: 0.5 TABLET ORAL at 10:17

## 2021-05-28 RX ADMIN — OXYCODONE 10 MG: 5 TABLET ORAL at 05:03

## 2021-05-28 RX ADMIN — ZIPRASIDONE HYDROCHLORIDE 60 MG: 60 CAPSULE ORAL at 22:15

## 2021-05-28 RX ADMIN — ACETAMINOPHEN 650 MG: 325 TABLET, FILM COATED ORAL at 12:21

## 2021-05-28 RX ADMIN — METRONIDAZOLE 500 MG: 500 TABLET ORAL at 05:05

## 2021-05-28 RX ADMIN — POLYETHYLENE GLYCOL 3350 1 PACKET: 17 POWDER, FOR SOLUTION ORAL at 05:03

## 2021-05-28 RX ADMIN — POTASSIUM CHLORIDE 20 MEQ: 20 TABLET, EXTENDED RELEASE ORAL at 05:05

## 2021-05-28 RX ADMIN — MAGNESIUM SULFATE 2 G: 2 INJECTION INTRAVENOUS at 10:17

## 2021-05-28 RX ADMIN — HYDROCORTISONE 5 MG: 10 TABLET ORAL at 15:52

## 2021-05-28 RX ADMIN — NEOMYCIN SULFATE 500 MG: 500 TABLET ORAL at 12:21

## 2021-05-28 RX ADMIN — OXYCODONE 10 MG: 5 TABLET ORAL at 15:52

## 2021-05-28 RX ADMIN — PAROXETINE HYDROCHLORIDE 20 MG: 20 TABLET, FILM COATED ORAL at 10:17

## 2021-05-28 RX ADMIN — NEOMYCIN SULFATE 500 MG: 500 TABLET ORAL at 05:04

## 2021-05-28 RX ADMIN — SODIUM CHLORIDE, POTASSIUM CHLORIDE, SODIUM LACTATE AND CALCIUM CHLORIDE: 600; 310; 30; 20 INJECTION, SOLUTION INTRAVENOUS at 05:16

## 2021-05-28 RX ADMIN — DOCUSATE SODIUM 100 MG: 100 CAPSULE, LIQUID FILLED ORAL at 17:55

## 2021-05-28 RX ADMIN — ACETAMINOPHEN 650 MG: 325 TABLET, FILM COATED ORAL at 05:06

## 2021-05-28 RX ADMIN — CLONAZEPAM 1 MG: 1 TABLET ORAL at 17:57

## 2021-05-28 RX ADMIN — ACETAMINOPHEN 650 MG: 325 TABLET, FILM COATED ORAL at 17:56

## 2021-05-28 RX ADMIN — FLUDROCORTISONE ACETATE 0.1 MG: 0.1 TABLET ORAL at 05:05

## 2021-05-28 RX ADMIN — DIGOXIN 125 MCG: 125 TABLET ORAL at 17:55

## 2021-05-28 RX ADMIN — CHLORTHALIDONE 25 MG: 25 TABLET ORAL at 05:06

## 2021-05-28 RX ADMIN — METOPROLOL SUCCINATE 12.5 MG: 25 TABLET, EXTENDED RELEASE ORAL at 05:11

## 2021-05-28 RX ADMIN — LEVOTHYROXINE SODIUM 112 MCG: 0.11 TABLET ORAL at 05:05

## 2021-05-28 RX ADMIN — ENOXAPARIN SODIUM 40 MG: 40 INJECTION SUBCUTANEOUS at 05:06

## 2021-05-28 RX ADMIN — DOCUSATE SODIUM 50 MG AND SENNOSIDES 8.6 MG 2 TABLET: 8.6; 5 TABLET, FILM COATED ORAL at 05:04

## 2021-05-28 RX ADMIN — ATORVASTATIN CALCIUM 40 MG: 40 TABLET, FILM COATED ORAL at 17:55

## 2021-05-28 ASSESSMENT — GAIT ASSESSMENTS
GAIT LEVEL OF ASSIST: MINIMAL ASSIST
ASSISTIVE DEVICE: FRONT WHEEL WALKER
DEVIATION: BRADYKINETIC;SHUFFLED GAIT;DECREASED BASE OF SUPPORT
DISTANCE (FEET): 50

## 2021-05-28 ASSESSMENT — COGNITIVE AND FUNCTIONAL STATUS - GENERAL
DRESSING REGULAR LOWER BODY CLOTHING: A LOT
PERSONAL GROOMING: A LITTLE
TOILETING: A LITTLE
CLIMB 3 TO 5 STEPS WITH RAILING: TOTAL
SUGGESTED CMS G CODE MODIFIER DAILY ACTIVITY: CK
MOVING TO AND FROM BED TO CHAIR: A LOT
TURNING FROM BACK TO SIDE WHILE IN FLAT BAD: A LOT
DAILY ACTIVITIY SCORE: 17
DRESSING REGULAR UPPER BODY CLOTHING: A LITTLE
MOBILITY SCORE: 12
SUGGESTED CMS G CODE MODIFIER MOBILITY: CL
STANDING UP FROM CHAIR USING ARMS: A LITTLE
MOVING FROM LYING ON BACK TO SITTING ON SIDE OF FLAT BED: UNABLE
HELP NEEDED FOR BATHING: A LOT
WALKING IN HOSPITAL ROOM: A LITTLE

## 2021-05-28 ASSESSMENT — ENCOUNTER SYMPTOMS
SHORTNESS OF BREATH: 0
CHILLS: 0
FEVER: 0
FLANK PAIN: 0
HALLUCINATIONS: 0
BLURRED VISION: 0
WEAKNESS: 1
DIARRHEA: 0
FOCAL WEAKNESS: 0
NERVOUS/ANXIOUS: 0
HEARTBURN: 0
DIZZINESS: 0
WHEEZING: 0
NAUSEA: 0
HEADACHES: 0
SORE THROAT: 0
VOMITING: 0
SENSORY CHANGE: 0
NECK PAIN: 0
PALPITATIONS: 0
DIAPHORESIS: 0
DEPRESSION: 1
ABDOMINAL PAIN: 1
COUGH: 0
BACK PAIN: 0
SPEECH CHANGE: 0
MYALGIAS: 0

## 2021-05-28 ASSESSMENT — PAIN DESCRIPTION - PAIN TYPE: TYPE: SURGICAL PAIN

## 2021-05-28 NOTE — WOUND TEAM
Spoke with Dr. Desouza regarding patients wound care needs.  Midline with intermittent packing.  Dressing change will be done tomorrow by Dr. Desouza, he will call with further needs.

## 2021-05-28 NOTE — PROGRESS NOTES
Pt is A&O 4  Pain controlled at this time on current regimen, pt medicated per MAR   Denies nausea  Tolerating a GI soft diet   Incision to abdomen, covered with ABD dressing and tape.  Small amount of serosanguinous drainage present  + Voids per frye catheter placed for retention  - flatus  - BM  Up x1 assist  SCD's on  Bed alarm on, pt high fall risk per juli chávez  Pt on LH, teli sitter in place throughout shift.  Operating appropriately.  Reviewed plan of care with patient, bed in lowest position and locked, pt resting comfortably now, call light within reach, all needs met at this time. Interventions will be executed per plan of care

## 2021-05-28 NOTE — OP REPORT
Operative report    PreOp Diagnosis: Colostomy status      PostOp Diagnosis: Same      Procedure(s):  COLOSTOMY TAKEDOWN AND CLOSURE - Wound Class: Clean Contaminated  LAPAROTOMY, EXPLORATORY - Wound Class: Clean Contaminated    Surgeon(s):  JEFFREY Acuña D.O.    Anesthesiologist/Type of Anesthesia:  Anesthesiologist: Carmela Sarmiento M.D./General    Surgical Staff:  Circulator: Amanda Monaco R.N.  Relief Circulator: Brian Pitts R.N.  Relief Scrub: Emeli Smith  Scrub Person: Vivian Leyva R.N.    Specimens removed if any:  ID Type Source Tests Collected by Time Destination   A : ABDOMINAL WALL SCAR Tissue Abdominal PATHOLOGY SPECIMEN Kin Desouza D.O. 5/27/2021  2:50 PM    B : COLOSTOMY Other Other PATHOLOGY SPECIMEN Kin Desouza D.O. 5/27/2021  2:50 PM        Estimated Blood Loss: 50 cc    Findings: Transverse colostomy  without significant hernia.  Most of descending colon intact.  No sign of vascular compromise.  Excoriated chronic wound of abdominal wall.    Complications: None noted    Indications: 56-year-old male with significant psychiatric illness who had colostomy after large bowel injury and resection of a splenic flexure last fall.  Patient has had significant issues with suicidal ideations worse than his baseline psychiatric illness that he stated were due to the presence of his colostomy.    Patient had lost significant weight over the last few months and was medically optimized.  His anticoagulation was stopped 2 days ago and preoperative labs and EKG were appropriate.  He was able to sign consent and surgery was planned for this afternoon.    Operative report: Patient was brought to the operating room placed in the supine position on the operating table.  He is placed under general anesthesia and intubated by the anesthesiologist.  Patient had a urethral stricture which required insertion of a 14 Canadian coudé catheter.  An orogastric tube was placed as well.  The  ostomy was sewn closed using a 0 Vicryl suture.  The abdomen was then scrubbed with chlorhexidine, dried and then prepped with Betadine.  Patient was draped out in standard fashion.  Made a fusiform incision around the colostomy and dissected it out from the surrounding tissues until the fascia was encountered.  Adhesions to the fascia were lysed and the ostomy was easily mobile.  We amputated the distal 3 cm using a TA stapler.  We then reduced the large bowel into the abdomen.  The colostomy was passed off as a specimen.    We then changed gloves and made a midline incision over the patient's prior surgical scar.  There is a large excoriated wound just superior to and right of the umbilicus.  This was dissected away and passed off as a second specimen.  We then dissected down to the fascia using the Bovie cautery.  Hemostasis was achieved during the course of this dissection using the Bovie.  We opened the fascia just above the umbilicus and then opened along the length of the wound.  Brief lysis of adhesions was performed with blunt dissection as well as the Bovie.  We then were able to dissect the transverse colon away from the abdominal wall near the old ostomy site.  We had excellent length of the transverse colon.  We then docked the Omni retractor to expose the descending colon.  We identified the proximal end and grasped with the traction medially.  We then mobilized the white line of Toldt and the supporting tissues all the way down to the sigmoid colon.  This adequately mobilized the left colon.  We then brought to the transverse and descending colon together in [id number], functional end-to-end fashion.  We tacked the antimesenteric borders together using 3-0 Vicryl Lembert sutures.  Enterotomies were made and the PACO stapler was inserted.  We fired to create the anastomosis.  The resulting enterotomy was then closed using a TA stapler.  We fulgurated the exposed mucosa and then inverted the staple  line using a row of 3-0 Vicryl sutures in a Lembert fashion.  2-0 Vicryl was then used to close the mesenteric defect.  We reduced to the anastomosis into the abdomen and it fell into place in the area where the splenic fracture had formally been.  We ran the small bowel assuring that there was no sign of injury.  The abdomen was irrigated.  We then closed the posterior fascia at the colostomy site using a running 0 PDS suture.  The anterior fascia was closed using interrupted PDS suture.  The subcutaneous tissues at the old ostomy site was then irrigated and the skin was loosely approximated using 3-0 Vicryl sutures in a inverted dermal layer.  The wound was then packed with 4 x 4 gauze between the sutures.  We once again did examine the abdomen ensured all laparotomy pads have been removed.  There is no sign of bleeding.  The fascia in the midline wound was then approximated using interrupted #1 PDS in a figure-of-eight fashion.  Once this was completed we irrigated the midline wound.  The wound was closed loosely with staples and then 4 x 4 gauze was used to pack between the staples.  The abdomen was cleaned and dried and dry sterile dressing was applied.  Patient was then awakened from anesthesia and taken to the postanesthesia care unit in stable condition.  The sponge, needle and instrument counts were correct at the end of the case.      5/27/2021 5:02 PM Kin Desouza D.O.

## 2021-05-28 NOTE — CARE PLAN
The patient is Stable - Low risk of patient condition declining or worsening    Shift Goals  Clinical Goals: adequate pain control  Patient Goals: rest    Progress made toward(s) clinical / shift goals:  pt medicated per MAR with oxycodone.  Pt able to rest between medication administration.    Problem: Provide Safe Environment  Goal: Suicide environmental safety, protocols, policies, and practices will be implemented  Outcome: Progressing     Problem: Fall Risk  Goal: Patient will remain free from falls  Outcome: Progressing

## 2021-05-28 NOTE — PROGRESS NOTES
[facility] Medicine Daily Progress Note    Date of Service  5/28/2021    Chief Complaint  56 y.o. male admitted 2/9/2021 with Syncope.    Hospital Course  Admitted with syncope, he was noted to be bradycardic and hypotensive.  He has known history of permanent atrial fibrillation and he was on diltiazem, digoxin and metoprolol.  These medications were held.  Later on he was restarted on low-dose Toprol and Digoxin.  He was also started on anticoagulation with Eliquis.  He also had a possible history of adrenal insufficiency, he was already on Florinef and midodrine.  Midodrine was discontinued and he was started on cortisol. Further work-up showed that he had gram-negative bacteremia as well as urinary tract infection and acute kidney injury.  He was initially placed on IV Zosyn, later changed to oral Bactrim to finish the course.  He was carefully hydrated with IV fluids.  On admission he also expressed suicidal ideation.  Psychiatry was consulted the case, he was placed on a legal hold.  He was noted to have severe depression.  He has been placed on medication the form of Geodon, Paxil, Klonopin.  He has a colostomy in place, which the patient believes contributes to his severe depression because of ostomy care.  He had history of bowel perforation and splenic fluid collection requiring Segmental colectomy, colostomy creation, mobilization of splenic flexure, wound VAC placement, irrigation and debridement of flank wound on 11/10/2020.  Surgery was consulted on the case, and he underwent exploratory laparotomy and colostomy takedown and closure on 5/27/2021.    Interval Problem Update  Colostomy - pain mostly controlled, discussed case with surgery  Suicidal -persistent thoughts, but patient states seems to be less, no clear suicidal plan  Afib - rate   Diabetes -   Low magnesium    Consultants/Specialty  Cardiology  Psychiatry  Surgery    Code Status  Full Code    Disposition  Legal Hold  SNF vs  Inpatient psychiatry vs Group home    Review of Systems  Review of Systems   Constitutional: Positive for malaise/fatigue. Negative for chills, diaphoresis and fever.   HENT: Negative for congestion, hearing loss and sore throat.    Eyes: Negative for blurred vision.   Respiratory: Negative for cough, shortness of breath and wheezing.    Cardiovascular: Negative for chest pain, palpitations and leg swelling.   Gastrointestinal: Positive for abdominal pain. Negative for diarrhea, heartburn, nausea and vomiting.   Genitourinary: Negative for dysuria, flank pain and hematuria.   Musculoskeletal: Negative for back pain, joint pain, myalgias and neck pain.   Skin: Negative for rash.   Neurological: Positive for weakness. Negative for dizziness, sensory change, speech change, focal weakness and headaches.   Psychiatric/Behavioral: Positive for depression and suicidal ideas. Negative for hallucinations. The patient is not nervous/anxious.       Physical Exam  Temp:  [36.1 °C (97 °F)-36.9 °C (98.5 °F)] 36.4 °C (97.6 °F)  Pulse:  [] 94  Resp:  [8-21] 15  BP: ()/(60-97) 94/64  SpO2:  [95 %-100 %] 95 %    Physical Exam  Vitals and nursing note reviewed.   Constitutional:       Appearance: He is obese.   HENT:      Head: Normocephalic and atraumatic.      Nose: No congestion.      Mouth/Throat:      Mouth: Mucous membranes are moist.   Eyes:      Extraocular Movements: Extraocular movements intact.      Conjunctiva/sclera: Conjunctivae normal.      Pupils: Pupils are equal, round, and reactive to light.   Cardiovascular:      Rate and Rhythm: Normal rate. Rhythm irregularly irregular.   Pulmonary:      Effort: Pulmonary effort is normal.      Breath sounds: Normal breath sounds.   Abdominal:      General: There is no distension.      Tenderness: There is abdominal tenderness. There is no guarding or rebound.      Comments: Dressing in place   Musculoskeletal:      Cervical back: No muscular tenderness.      Right  lower leg: Edema present.      Left lower leg: Edema present.   Skin:     General: Skin is warm and dry.   Neurological:      General: No focal deficit present.      Mental Status: He is alert and oriented to person, place, and time.      Cranial Nerves: No cranial nerve deficit.   Psychiatric:         Mood and Affect: Mood is not anxious.         Speech: Speech normal.         Thought Content: Thought content is not paranoid or delusional. Thought content includes suicidal ideation. Thought content does not include homicidal ideation. Thought content does not include homicidal or suicidal plan.         Judgment: Judgment is not impulsive.       Fluids    Intake/Output Summary (Last 24 hours) at 5/28/2021 1313  Last data filed at 5/28/2021 0900  Gross per 24 hour   Intake 2751.67 ml   Output 1550 ml   Net 1201.67 ml       Laboratory  Recent Labs     05/25/21  1513 05/28/21  0424   WBC 11.6* 17.9*   RBC 5.31 5.32   HEMOGLOBIN 15.2 15.4   HEMATOCRIT 47.2 47.6   MCV 88.9 89.5   MCH 28.6 28.9   MCHC 32.2* 32.4*   RDW 46.1 47.5   PLATELETCT 234 236   MPV 11.2 10.7     Recent Labs     05/25/21  1513 05/28/21  0424   SODIUM 138 134*   POTASSIUM 3.8 4.5   CHLORIDE 97 100   CO2 30 24   GLUCOSE 121* 166*   BUN 28* 23*   CREATININE 1.09 1.24   CALCIUM 9.7 8.9     Recent Labs     05/25/21  1513   APTT 33.8   INR 1.09               Imaging  EC-ECHOCARDIOGRAM COMPLETE W/ CONT   Final Result      DX-CHEST-PORTABLE (1 VIEW)   Final Result      1.  Minor asymmetric interstitial opacity in the right lower lobe which could represent minor interstitial edema, pneumonia, or fibrotic change.      2.  No left lung consolidation.      3.  No evidence of congestive failure.      [id number] VIEW   Final Result         No specific finding to suggest small bowel obstruction.      WL-CICBWHA-3 VIEW   Final Result         No significant interval change.      DX-CHEST-LIMITED (1 VIEW)   Final Result         Diffuse interstitial prominence could  relate to mild pulmonary edema or atypical infection      [id number] VIEW   Final Result      Increased colonic gas without definite bowel obstruction.      US-RENAL   Final Result      No evidence of hydronephrosis.      Lobulated kidneys bilaterally.      CT-ABDOMEN-PELVIS WITH   Final Result      1.  There is no evidence of small bowel obstruction.   2.  There is a left lower quadrant ostomy.   3.  There is fluid distention of the colon distal to the surgical material in the left mid descending colon extending all the way to the rectum. There is no pneumatosis or free air.   4.  There is cholelithiasis without biliary dilatation.   5.  There has been interval removal of the drainage catheter anterior to the spleen with minimal hypodense area in the anterior spleen again noted. There is no new fluid collection.      IR-US GUIDED PIV   Final Result    Ultrasound-guided PERIPHERAL IV INSERTION performed by    qualified nursing staff as above.      EC-ECHOCARDIOGRAM COMPLETE W/O CONT   Final Result      DX-LUMBAR SPINE-2 OR 3 VIEWS   Final Result      Moderate compression deformity of T11 is new compared to 2018.      Mild wedge deformity of T12 is unchanged.      Degenerative changes including facet arthropathy.      Mild retrolisthesis of L5 on S1 and L3 on L4.              Assessment/Plan  * Syncope- (present on admission)  Assessment & Plan  secondary hypotension and bradycardia    UTI (urinary tract infection)- (present on admission)  Assessment & Plan  Completed 10-day course of Bactrim    Gram-negative bacteremia- (present on admission)  Assessment & Plan  Completed 10-day course of Bactrim    Major depressive disorder, recurrent, severe with psychotic features (HCC)- (present on admission)  Assessment & Plan  Geodon, Paxil, Klonopin    Colostomy present (HCC)- (present on admission)  Assessment & Plan  Exploratory laparotomy with colostomy takedown and closure 5/27/2021  Segmental colectomy, colostomy  creation, mobilization of splenic flexure, wound VAC placement, irrigation and debridement of flank wound on 11/10/2020.  Pain control  Encourage I-S    Suicidal ideation- (present on admission)  Assessment & Plan  Legal Hold   Psychiatry following    Leukocytosis- (present on admission)  Assessment & Plan  Follow cbc    Obstructive uropathy- (present on admission)  Assessment & Plan  Bladder scan and straight cath prn    Permanent atrial fibrillation (HCC)- (present on admission)  Assessment & Plan  Toprol, Digoxin  Check Digoxin level  Resume Eliquis when cleared by Surgery     Chronic heart failure with preserved ejection fraction (HCC)- (present on admission)  Assessment & Plan  Toprol  stop HCTZ  Check probnp    Type 2 diabetes mellitus with hyperglycemia, with long-term current use of insulin ([facility])- (present on admission)  Assessment & Plan  Follow bmp  Currently not requiring Insulin    Hypothyroidism- (present on admission)  Assessment & Plan  Levothyroxine   Check TSH    SRUTHI (acute kidney injury) ([facility])- (present on admission)  Assessment & Plan  Follow bmp  Check PTH    Adrenal insufficiency ([facility])- (present on admission)  Assessment & Plan  Possible?  Cortef    Debility- (present on admission)  Assessment & Plan  PT and OT   Check vit b12 and d levels    Orthostatic hypotension- (present on admission)  Assessment & Plan  Florinef    Hypomagnesemia- (present on admission)  Assessment & Plan  IV Mg 2 g  Follow level    Hypokalemia- (present on admission)  Assessment & Plan  Stop Kdur, IV KCl  Follow bmp    Hyponatremia- (present on admission)  Assessment & Plan  Change IVF to NS  Follow bmp    Chronic venous insufficiency of lower extremity- (present on admission)  Assessment & Plan  compression stockings.    Mixed hyperlipidemia- (present on admission)  Assessment & Plan  Atorvastatin.     Lower limb amputation, great toe (HCC)- (present on admission)  Assessment & Plan  monitor    Class 2 severe obesity  with serious comorbidity and body mass index (BMI) of 35.0 to 35.9 in adult (HCC)- (present on admission)  Assessment & Plan  Body mass index is 35.19 kg/m².       VTE prophylaxis: Lovenox

## 2021-05-28 NOTE — THERAPY
Physical Therapy   Re-Evaluation     Patient Name: Sebastián Castro  Age:  56 y.o., Sex:  male  Medical Record #: [id number]  Today's Date: 5/28/2021     Precautions: (P) Fall Risk    Assessment  Patient is 56 y.o. male who was seen on 4/24 from therapy and was SPV with all functional mobility. Pt is now s/p colostomy takedown and closure and laparotomy/exploratory. Pt presented to PT with impaired balance, BLE weakness, impaired gait, pain, and dec activity tolerance. Pt is primarily limited by weakness and pain. Pt demonstrates with Min to Mod A for all functional mobility. Pt was able to tolerate about 50 ft of ambulation with 4WW and required a seated rest break in chair. Pt presented with BP of 78/51 with upright posture and was assisted back to supine. In supine pt was able to demonstrate appropriate vital signs. RN aware of concerns. Pt will continue to benefit from skilled PT while in house given current functional impairments. With current functional mobility pt will benefit from post acute therapy prior to d/c. Anticipate pt to progress while in house, will update plan of care at pt continues to progress.     Plan    Recommend Physical Therapy 3 times per week until therapy goals are met for the following treatments:  Bed Mobility, Community Re-integration, Equipment, Gait Training, Manual Therapy, Neuro Re-Education / Balance, Self Care/Home Evaluation, Stair Training, Therapeutic Activities and Therapeutic Exercises    DC Equipment Recommendations: (P) Unable to determine at this time  Discharge Recommendations: (P) Recommend post-acute placement for additional physical therapy services prior to discharge home     Objective       05/28/21 0900   Initial Contact Note    Initial Contact Note Order Received and Verified, Physical Therapy Evaluation in Progress with Full Report to Follow.   Precautions   Precautions Fall Risk   Comments intermittent orthostatic hypotension    Vitals   O2 (LPM) 1   O2 Delivery  Device Oxymask   Pain 0 - 10 Group   Location Abdomen   Location Orientation Lower   Description Cramping   Therapist Pain Assessment Prior to Activity;During Activity;Post Activity;Nurse Notified  (c/o moderate pain with mobility)   Prior Living Situation   Prior Services None   Housing / Facility Group Home   Steps Into Home 0   Bathroom Set up Bathtub / Shower Combination   Equipment Owned 4-Wheel Walker   Lives with - Patient's Self Care Capacity Attendant / Paid Care Giver   Comments pt states he lives at a group home and staff assists with medication and provide meals   Prior Level of Functional Mobility   Bed Mobility Independent   Transfer Status Independent   Ambulation Independent   Distance Ambulation (Feet)   (limited household )   Assistive Devices Used 4-Wheel Walker   Stairs Independent   Comments pt reports of an IPLOF within group home; last re evaluation pt was SPV with all mobility    History of Falls   History of Falls No   Cognition    Cognition / Consciousness X   Level of Consciousness Alert   New Learning Impaired   Attention Impaired   Comments flat affect; pleasant/cooperative during session   Passive ROM Lower Body   Passive ROM Lower Body WDL   Active ROM Lower Body    Active ROM Lower Body  WDL   Strength Lower Body   Lower Body Strength  X   Rt Hip Flexion Strength 3 (F)   Rt Knee Flexion Strength 3 (F)   Rt Knee Extension Strength 3 (F)   Rt Ankle Dorsiflexion Strength 3 (F)   Lt Hip Flexion Strength 3 (F)   Lt Knee Flexion Strength 3 (F)   Lt Knee Extension Strength 3 (F)   Lt Ankle Dorsiflexion Strength 3 (F)   Comments demonstrates with deconditioned BLE secondary to immobilization and post op weakness   Sensation Lower Body   Lower Extremity Sensation   X   Comments states of R foot insensitivity; reports of L LE neuropathy    Coordination Lower Body    Coordination Lower Body  Not Tested   Balance Assessment   Sitting Balance (Static) Fair   Sitting Balance (Dynamic) Fair    Standing Balance (Static) Fair   Standing Balance (Dynamic) Fair -   Weight Shift Sitting Fair   Weight Shift Standing Fair   Comments demonstrated with posterior lean upon sitting EOB, primarily to avoid abdomen pain; signficant B UE support on 4WW during standing and ambulation    Gait Analysis   Gait Level Of Assist Minimal Assist   Assistive Device Front Wheel Walker   Distance (Feet) 50   # of Times Distance was Traveled 1   Deviation Bradykinetic;Shuffled Gait;Decreased Base Of Support   Weight Bearing Status no restrictions   Comments easily fatigued; required seated rest break after ambulating to sink   Bed Mobility    Supine to Sit Moderate Assist   Scooting Minimal Assist   Rolling Minimal Assist to Rt.;Minimum Assist to Lt.   Comments HOB elevated and rails up; encouraged to perfrom log roll for pain managment of abdomen    Functional Mobility   Sit to Stand Moderate Assist   Bed, Chair, Wheelchair Transfer Minimal Assist   Transfer Method Stand Step   Mobility EOB, sit<>stand, ambulation to sink, standing, ambulation around bed, transfer to chair, back to bed    Comments pt was able to sit up in chair for about 5 mins, however, was assisted back to supine due to low BP with upright posture    How much difficulty does the patient currently have...   Turning over in bed (including adjusting bedclothes, sheets and blankets)? 2   Sitting down on and standing up from a chair with arms (e.g., wheelchair, bedside commode, etc.) 1   Moving from lying on back to sitting on the side of the bed? 2   How much help from another person does the patient currently need...   Moving to and from a bed to a chair (including a wheelchair)? 3   Need to walk in a hospital room? 3   Climbing 3-5 steps with a railing? 1   6 clicks Mobility Score 12   Activity Tolerance   Sitting in Chair 5 mins   Sitting Edge of Bed 5 mins   Standing 5 mins   Comments limited due to fatigue, abdominal pain, and low BP    Patient / Family Goals     Patient / Family Goal #1 walk again   Short Term Goals    Short Term Goal # 1 Pt will perform supine<->sit with supv within 6 visits in order to return home   Short Term Goal # 2 Pt will transfer bed<->chair with 4WW with supv within 6 visits in order to return home   Short Term Goal # 3 Pt will ambulate 100' with supv within 6 visits in order to return home   Short Term Goal # 4 Pt will ascend/descend 5 steps with supv within 6 visits in order to enter/exit home   Education Group   Role of Physical Therapist Patient Response Patient;Acceptance;Explanation;Verbal Demonstration   Problem List    Problems Pain;Impaired Bed Mobility;Impaired Transfers;Impaired Ambulation;Impaired Balance;Decreased Activity Tolerance   Anticipated Discharge Equipment and Recommendations   DC Equipment Recommendations Unable to determine at this time   Discharge Recommendations Recommend post-acute placement for additional physical therapy services prior to discharge home   Interdisciplinary Plan of Care Collaboration   IDT Collaboration with  Nursing   Patient Position at End of Therapy In Bed;Call Light within Reach;Tray Table within Reach;Phone within Reach;Bed Alarm On   Collaboration Comments aware of visit and recs    Session Information   Date / Session Number  5/28-1 (1/3, 6/3)

## 2021-05-28 NOTE — PROGRESS NOTES
Surgery    POD 1 after colostomy takedown  Drinking some but does not feel like eating  Postop leukocytosis  Mild tachycardia but pain controlled    BP (!) 94/64 Comment: RN notified   Pulse 94   Temp 36.4 °C (97.6 °F) (Temporal)   Resp 15   Ht 1.829 m (6')   Wt 113 kg (249 lb 1.9 oz)   SpO2 95%   BMI 33.79 kg/m²     No distress  Abdomen soft with incisional tenderness.  Fair amount of staining on dressing but no obvious bleeding    Recent Labs     05/25/21  1513 05/28/21  0424   WBC 11.6* 17.9*   RBC 5.31 5.32   HEMOGLOBIN 15.2 15.4   HEMATOCRIT 47.2 47.6   MCV 88.9 89.5   MCH 28.6 28.9   RDW 46.1 47.5   PLATELETCT 234 236   MPV 11.2 10.7   NEUTSPOLYS 63.10 84.60*   LYMPHOCYTES 26.50 7.60*   MONOCYTES 5.40 6.00   EOSINOPHILS 4.30 0.80   BASOPHILS 0.40 0.40         Assessment and plan:  Status post takedown of colostomy with colocolic anastomosis  Leukocytosis likely reactive: Recheck in a.m.  Encourage carbohydrate rich clear liquids if not taking more solid diet  Cut back IV fluids and Hep-Lock if taking more than 500 cc p.o. every 8 hours  Okay to replace abdominal dressing as needed, but keep packing in place  Mobilize  DC Zabala once mobilizing  Incentive spirometer    Management of comorbidities per medical service  Telemetry sitter

## 2021-05-28 NOTE — OR NURSING
1642 - Pt arrived to PACU 9A in stable condition.  Abdominal dressing CD&I.    1645 - Pt awake and c/o of severe pain and nausea.     1700 - Pt on 10L oxymask and has drank apple juice.     1715 - Dr. Desouza at bedside. Pt resting with eyes closed. Unable to get a hold of Bud, brother.    1730 - Pt sat on edge of bed for 5 minutes. Pt weak and c/o moderate pain.  Ice applied.    1800 - Pt states that he is feeling better.

## 2021-05-28 NOTE — PROGRESS NOTES
Bedside report received.  Assessment complete.  A&O x 4. Patient calls appropriately.  Patient ambulates with x1-2 assist. Bed alarm on.   Patient has 8/10 pain. Pain managed with prescribed medications.  Denies N&V. Tolerating regular diet.  Dressing to majority of abdomen, old drainage present.   + void via frye, - flatus, - BM.  Patient denies SOB.  SCD's off.  Patient on legal hold with tele sitter in place   Review plan with of care with patient. Call light and personal belongings with in reach. Hourly rounding in place. All needs met at this time.

## 2021-05-28 NOTE — THERAPY
"Occupational Therapy   Initial Evaluation     Patient Name: Sebastián Castro  Age:  56 y.o., Sex:  male  Medical Record #: [id number]  Today's Date: 5/28/2021     Precautions  Precautions: (P) Fall Risk  Comments: (P) monitor for OH    Assessment  Patient is 56 y.o. male who is now s/p colostomy takedown and closure w/ ex lap. Pt primarily limited by pain and orthostatic hypotension. Pt performed functional mobility to sink using 4WW at sink pt c/o dizziness. Pt sat down at sink BP was 110/61. Pt then performed functional mobility to bedside chair, BP in chair was 78/58 and BP did not increase in 2-3 min. Pt then placed back in bed. Pt demo'd impaired balance, functional mobility, and activity tolerance impacting functional independence. Will continue to follow.    Plan    Recommend Occupational Therapy 3 times per week until therapy goals are met for the following treatments:  Adaptive Equipment, Neuro Re-Education / Balance, Self Care/Activities of Daily Living, Therapeutic Activities and Therapeutic Exercises.    DC Equipment Recommendations: (P) Unable to determine at this time  Discharge Recommendations: (P) Recommend post-acute placement for additional occupational therapy services prior to discharge home     Subjective    \"I am seeing spots\"     Objective       05/28/21 0901   Total Time Spent   Total Time Spent (Mins) 40   Charge Group   OT Evaluation OT Re-evaluation   Initial Contact Note    Initial Contact Note Order Received and Verified, Occupational Therapy Evaluation in Progress with Full Report to Follow.   Precautions   Precautions Fall Risk   Comments monitor for OH   Vitals   Vitals Comments BP sitting after oral care in standing : 110/31, pt mobilized to bedside chair once seated 78/58 BP after sitting for ~2 min 79/56, Pt returned to supine    Pain 0 - 10 Group   Therapist Pain Assessment Post Activity Pain Same as Prior to Activity;Nurse Notified  (c/o abd pain)   Cognition    Cognition / " Consciousness X   Level of Consciousness Alert   New Learning Impaired   Attention Impaired   Comments pleasent and cooperative during session   Balance Assessment   Sitting Balance (Static) Fair   Sitting Balance (Dynamic) Fair   Standing Balance (Static) Fair -   Standing Balance (Dynamic) Fair -   Weight Shift Sitting Fair   Weight Shift Standing Fair   Comments w/ 4WW   Bed Mobility    Supine to Sit Moderate Assist   Sit to Supine Moderate Assist   Scooting Minimal Assist   ADL Assessment   Grooming Minimal Assist;Standing  (had to sit after ~3 min of standing )   Lower Body Dressing Maximal Assist  (due to pain)   How much help from another person does the patient currently need...   Putting on and taking off regular lower body clothing? 2   Bathing (including washing, rinsing, and drying)? 2   Toileting, which includes using a toilet, bedpan, or urinal? 3   Putting on and taking off regular upper body clothing? 3   Taking care of personal grooming such as brushing teeth? 3   Eating meals? 4   6 Clicks Daily Activity Score 17   Functional Mobility   Sit to Stand Moderate Assist   Bed, Chair, Wheelchair Transfer Minimal Assist   Mobility within room w/ 4WW   Comments becomes tremulous w/ exertion   Activity Tolerance   Sitting in Chair 10 min total   Sitting Edge of Bed 5 min   Standing 5 min   Patient / Family Goals   Patient / Family Goal #1 To feel better   Short Term Goals   Short Term Goal # 1 Pt will perform LB dressing w/ min A   Short Term Goal # 2 Pt will perform functional t/f's with supervision   Short Term Goal # 3 Pt will perform h/g standing sink side with supervision   Education Group   Role of Occupational Therapist Patient Response Patient;Acceptance;Explanation;Verbal Demonstration   ADL Patient Response Patient;Acceptance;Explanation;Verbal Demonstration;Action Demonstration   Problem List   Problem List Decreased Active Daily Living Skills;Decreased Homemaking Skills;Decreased Functional  Mobility;Decreased Activity Tolerance;Safety Awareness Deficits / Cognition;Impaired Postural Control / Balance   Anticipated Discharge Equipment and Recommendations   DC Equipment Recommendations Unable to determine at this time   Discharge Recommendations Recommend post-acute placement for additional occupational therapy services prior to discharge home   Interdisciplinary Plan of Care Collaboration   IDT Collaboration with  Nursing   Patient Position at End of Therapy In Bed;Call Light within Reach;Tray Table within Reach;Phone within Reach;Bed Alarm On  (1:1 telesitter)   Collaboration Comments report given   Session Information   Date / Session Number  5/28, 1 (1/3, 6/3)   Priority 2

## 2021-05-28 NOTE — DISCHARGE PLANNING
Agency/Facility Name: Psych facilities   Referral re-sent per [facility] leader @ [phone]

## 2021-05-28 NOTE — PROGRESS NOTES
2 RN skin check complete with PJ RN    Pt has moisture dermatitus under pannus, multiple moisture fissures noted, redness present, wound picture uploaded.  Barrier cream applied.  Incisioin to abdomen, small amount of serosanguinous drainage noted, covered with ABD and silk tape.  Sacrum pink and blanchable. Sacral mepilex removed d/t moisture presence, barrier cream applied  Zabala catheter in place, secured with stat lock to L thigh, operating appropriately.  Feet dry, elevated on pillows, missing R great toe.  All other bony prominences observed, no abnormalities noted

## 2021-05-29 ENCOUNTER — APPOINTMENT (OUTPATIENT)
Dept: RADIOLOGY | Facility: MEDICAL CENTER | Age: 56
DRG: 981 | End: 2021-05-29
Attending: FAMILY MEDICINE
Payer: MEDICAID

## 2021-05-29 LAB
ANION GAP SERPL CALC-SCNC: 10 MMOL/L (ref 7–16)
BASOPHILS # BLD AUTO: 0.4 % (ref 0–1.8)
BASOPHILS # BLD: 0.07 K/UL (ref 0–0.12)
BUN SERPL-MCNC: 32 MG/DL (ref 8–22)
CALCIUM SERPL-MCNC: 9.2 MG/DL (ref 8.5–10.5)
CHLORIDE SERPL-SCNC: 100 MMOL/L (ref 96–112)
CO2 SERPL-SCNC: 24 MMOL/L (ref 20–33)
CREAT SERPL-MCNC: 2.28 MG/DL (ref 0.5–1.4)
DIGOXIN SERPL-MCNC: 0.6 NG/ML (ref 0.8–2)
EOSINOPHIL # BLD AUTO: 0.66 K/UL (ref 0–0.51)
EOSINOPHIL NFR BLD: 4 % (ref 0–6.9)
ERYTHROCYTE [DISTWIDTH] IN BLOOD BY AUTOMATED COUNT: 51.7 FL (ref 35.9–50)
GLUCOSE SERPL-MCNC: 137 MG/DL (ref 65–99)
HCT VFR BLD AUTO: 47.3 % (ref 42–52)
HGB BLD-MCNC: 14.7 G/DL (ref 14–18)
IMM GRANULOCYTES # BLD AUTO: 0.1 K/UL (ref 0–0.11)
IMM GRANULOCYTES NFR BLD AUTO: 0.6 % (ref 0–0.9)
LYMPHOCYTES # BLD AUTO: 2.49 K/UL (ref 1–4.8)
LYMPHOCYTES NFR BLD: 15.3 % (ref 22–41)
MAGNESIUM SERPL-MCNC: 2.2 MG/DL (ref 1.5–2.5)
MCH RBC QN AUTO: 29 PG (ref 27–33)
MCHC RBC AUTO-ENTMCNC: 31.1 G/DL (ref 33.7–35.3)
MCV RBC AUTO: 93.3 FL (ref 81.4–97.8)
MONOCYTES # BLD AUTO: 1.29 K/UL (ref 0–0.85)
MONOCYTES NFR BLD AUTO: 7.9 % (ref 0–13.4)
NEUTROPHILS # BLD AUTO: 11.7 K/UL (ref 1.82–7.42)
NEUTROPHILS NFR BLD: 71.8 % (ref 44–72)
NRBC # BLD AUTO: 0 K/UL
NRBC BLD-RTO: 0 /100 WBC
NT-PROBNP SERPL IA-MCNC: 2511 PG/ML (ref 0–125)
PLATELET # BLD AUTO: 195 K/UL (ref 164–446)
PMV BLD AUTO: 11 FL (ref 9–12.9)
POTASSIUM SERPL-SCNC: 5 MMOL/L (ref 3.6–5.5)
PTH-INTACT SERPL-MCNC: 44.3 PG/ML (ref 14–72)
RBC # BLD AUTO: 5.07 M/UL (ref 4.7–6.1)
SODIUM SERPL-SCNC: 134 MMOL/L (ref 135–145)
TSH SERPL DL<=0.005 MIU/L-ACNC: 6.99 UIU/ML (ref 0.38–5.33)
VIT B12 SERPL-MCNC: 781 PG/ML (ref 211–911)
WBC # BLD AUTO: 16.3 K/UL (ref 4.8–10.8)

## 2021-05-29 PROCEDURE — 700105 HCHG RX REV CODE 258: Performed by: FAMILY MEDICINE

## 2021-05-29 PROCEDURE — 80162 ASSAY OF DIGOXIN TOTAL: CPT

## 2021-05-29 PROCEDURE — 700102 HCHG RX REV CODE 250 W/ 637 OVERRIDE(OP): Performed by: SURGERY

## 2021-05-29 PROCEDURE — A9270 NON-COVERED ITEM OR SERVICE: HCPCS | Performed by: STUDENT IN AN ORGANIZED HEALTH CARE EDUCATION/TRAINING PROGRAM

## 2021-05-29 PROCEDURE — 80048 BASIC METABOLIC PNL TOTAL CA: CPT

## 2021-05-29 PROCEDURE — 700102 HCHG RX REV CODE 250 W/ 637 OVERRIDE(OP): Performed by: NURSE PRACTITIONER

## 2021-05-29 PROCEDURE — 700102 HCHG RX REV CODE 250 W/ 637 OVERRIDE(OP): Performed by: STUDENT IN AN ORGANIZED HEALTH CARE EDUCATION/TRAINING PROGRAM

## 2021-05-29 PROCEDURE — 700111 HCHG RX REV CODE 636 W/ 250 OVERRIDE (IP): Performed by: INTERNAL MEDICINE

## 2021-05-29 PROCEDURE — 83880 ASSAY OF NATRIURETIC PEPTIDE: CPT

## 2021-05-29 PROCEDURE — 700111 HCHG RX REV CODE 636 W/ 250 OVERRIDE (IP): Performed by: SURGERY

## 2021-05-29 PROCEDURE — A9270 NON-COVERED ITEM OR SERVICE: HCPCS | Performed by: HOSPITALIST

## 2021-05-29 PROCEDURE — 83735 ASSAY OF MAGNESIUM: CPT

## 2021-05-29 PROCEDURE — 770001 HCHG ROOM/CARE - MED/SURG/GYN PRIV*

## 2021-05-29 PROCEDURE — 90832 PSYTX W PT 30 MINUTES: CPT | Performed by: PSYCHOLOGIST

## 2021-05-29 PROCEDURE — 85025 COMPLETE CBC W/AUTO DIFF WBC: CPT

## 2021-05-29 PROCEDURE — 05H933Z INSERTION OF INFUSION DEVICE INTO RIGHT BRACHIAL VEIN, PERCUTANEOUS APPROACH: ICD-10-PCS | Performed by: FAMILY MEDICINE

## 2021-05-29 PROCEDURE — 36415 COLL VENOUS BLD VENIPUNCTURE: CPT

## 2021-05-29 PROCEDURE — 700102 HCHG RX REV CODE 250 W/ 637 OVERRIDE(OP): Performed by: HOSPITALIST

## 2021-05-29 PROCEDURE — 700105 HCHG RX REV CODE 258: Performed by: INTERNAL MEDICINE

## 2021-05-29 PROCEDURE — 76937 US GUIDE VASCULAR ACCESS: CPT

## 2021-05-29 PROCEDURE — A9270 NON-COVERED ITEM OR SERVICE: HCPCS | Performed by: NURSE PRACTITIONER

## 2021-05-29 PROCEDURE — A9270 NON-COVERED ITEM OR SERVICE: HCPCS | Performed by: SURGERY

## 2021-05-29 PROCEDURE — 83970 ASSAY OF PARATHORMONE: CPT

## 2021-05-29 PROCEDURE — 84443 ASSAY THYROID STIM HORMONE: CPT

## 2021-05-29 PROCEDURE — 99233 SBSQ HOSP IP/OBS HIGH 50: CPT | Performed by: FAMILY MEDICINE

## 2021-05-29 PROCEDURE — 82306 VITAMIN D 25 HYDROXY: CPT

## 2021-05-29 PROCEDURE — 82607 VITAMIN B-12: CPT

## 2021-05-29 RX ORDER — SODIUM CHLORIDE 9 MG/ML
500 INJECTION, SOLUTION INTRAVENOUS ONCE
Status: COMPLETED | OUTPATIENT
Start: 2021-05-29 | End: 2021-05-29

## 2021-05-29 RX ORDER — LEVOTHYROXINE SODIUM 0.12 MG/1
125 TABLET ORAL
Status: DISCONTINUED | OUTPATIENT
Start: 2021-05-30 | End: 2021-07-06

## 2021-05-29 RX ORDER — SODIUM CHLORIDE 9 MG/ML
INJECTION, SOLUTION INTRAVENOUS CONTINUOUS
Status: DISCONTINUED | OUTPATIENT
Start: 2021-05-29 | End: 2021-05-31

## 2021-05-29 RX ORDER — MAGNESIUM SULFATE HEPTAHYDRATE 40 MG/ML
2 INJECTION, SOLUTION INTRAVENOUS ONCE
Status: COMPLETED | OUTPATIENT
Start: 2021-05-29 | End: 2021-05-29

## 2021-05-29 RX ADMIN — LEVOTHYROXINE SODIUM 112 MCG: 0.11 TABLET ORAL at 06:15

## 2021-05-29 RX ADMIN — HYDROMORPHONE HYDROCHLORIDE: 1 INJECTION, SOLUTION INTRAMUSCULAR; INTRAVENOUS; SUBCUTANEOUS at 10:21

## 2021-05-29 RX ADMIN — METOPROLOL SUCCINATE 12.5 MG: 25 TABLET, EXTENDED RELEASE ORAL at 06:13

## 2021-05-29 RX ADMIN — HYDROCORTISONE 10 MG: 10 TABLET ORAL at 06:12

## 2021-05-29 RX ADMIN — ENOXAPARIN SODIUM 40 MG: 40 INJECTION SUBCUTANEOUS at 06:16

## 2021-05-29 RX ADMIN — OXYCODONE 10 MG: 5 TABLET ORAL at 14:22

## 2021-05-29 RX ADMIN — DOCUSATE SODIUM 50 MG AND SENNOSIDES 8.6 MG 2 TABLET: 8.6; 5 TABLET, FILM COATED ORAL at 06:13

## 2021-05-29 RX ADMIN — DEXTROSE AND SODIUM CHLORIDE: 5; 900 INJECTION, SOLUTION INTRAVENOUS at 03:40

## 2021-05-29 RX ADMIN — ZIPRASIDONE HYDROCHLORIDE 60 MG: 60 CAPSULE ORAL at 21:26

## 2021-05-29 RX ADMIN — POLYETHYLENE GLYCOL 3350 1 PACKET: 17 POWDER, FOR SOLUTION ORAL at 06:13

## 2021-05-29 RX ADMIN — CLONAZEPAM 0.5 MG: 0.5 TABLET ORAL at 08:10

## 2021-05-29 RX ADMIN — OXYCODONE 10 MG: 5 TABLET ORAL at 06:14

## 2021-05-29 RX ADMIN — SODIUM CHLORIDE: 9 INJECTION, SOLUTION INTRAVENOUS at 10:34

## 2021-05-29 RX ADMIN — CLONAZEPAM 1 MG: 1 TABLET ORAL at 18:03

## 2021-05-29 RX ADMIN — FLUDROCORTISONE ACETATE 0.1 MG: 0.1 TABLET ORAL at 06:13

## 2021-05-29 RX ADMIN — ACETAMINOPHEN 650 MG: 325 TABLET, FILM COATED ORAL at 18:04

## 2021-05-29 RX ADMIN — ACETAMINOPHEN 650 MG: 325 TABLET, FILM COATED ORAL at 23:45

## 2021-05-29 RX ADMIN — MAGNESIUM SULFATE 2 G: 2 INJECTION INTRAVENOUS at 03:40

## 2021-05-29 RX ADMIN — ACETAMINOPHEN 650 MG: 325 TABLET, FILM COATED ORAL at 11:52

## 2021-05-29 RX ADMIN — ACETAMINOPHEN 650 MG: 325 TABLET, FILM COATED ORAL at 06:15

## 2021-05-29 RX ADMIN — DOCUSATE SODIUM 100 MG: 100 CAPSULE, LIQUID FILLED ORAL at 06:15

## 2021-05-29 RX ADMIN — ATORVASTATIN CALCIUM 40 MG: 40 TABLET, FILM COATED ORAL at 18:03

## 2021-05-29 RX ADMIN — DOCUSATE SODIUM 100 MG: 100 CAPSULE, LIQUID FILLED ORAL at 18:03

## 2021-05-29 RX ADMIN — HYDROCORTISONE 5 MG: 10 TABLET ORAL at 14:19

## 2021-05-29 RX ADMIN — ZIPRASIDONE HYDROCHLORIDE 60 MG: 60 CAPSULE ORAL at 08:10

## 2021-05-29 RX ADMIN — PAROXETINE HYDROCHLORIDE 20 MG: 20 TABLET, FILM COATED ORAL at 08:10

## 2021-05-29 RX ADMIN — SODIUM CHLORIDE 500 ML: 9 INJECTION, SOLUTION INTRAVENOUS at 02:31

## 2021-05-29 ASSESSMENT — ENCOUNTER SYMPTOMS
BACK PAIN: 0
DEPRESSION: 1
FEVER: 0
NERVOUS/ANXIOUS: 0
WHEEZING: 0
FOCAL WEAKNESS: 0
WEAKNESS: 1
HEARTBURN: 0
SENSORY CHANGE: 0
DIZZINESS: 0
DIARRHEA: 0
FLANK PAIN: 0
CHILLS: 0
NAUSEA: 0
MYALGIAS: 0
VOMITING: 0
COUGH: 0
BLURRED VISION: 0
HALLUCINATIONS: 0
HEADACHES: 0
DIAPHORESIS: 0
NECK PAIN: 0
ABDOMINAL PAIN: 1
SPEECH CHANGE: 0
PALPITATIONS: 0
SHORTNESS OF BREATH: 0
SORE THROAT: 0

## 2021-05-29 ASSESSMENT — PAIN DESCRIPTION - PAIN TYPE
TYPE: ACUTE PAIN;SURGICAL PAIN
TYPE: ACUTE PAIN;SURGICAL PAIN
TYPE: SURGICAL PAIN
TYPE: ACUTE PAIN
TYPE: ACUTE PAIN;SURGICAL PAIN

## 2021-05-29 NOTE — PROGRESS NOTES
Pt has produced 35mL of urine since start of shift. Running IV fluids at 75. Updated Dr. Montana, received orders for 500mL NS bolus and Mg.    BP (!) 96/66   Pulse 100   Temp 36.8 °C (98.2 °F) (Temporal)   Resp 18   Ht 1.829 m (6')   Wt 113 kg (249 lb 1.9 oz)   SpO2 95%   BMI 33.79 kg/m²

## 2021-05-29 NOTE — CARE PLAN
The patient is Stable - Low risk of patient condition declining or worsening    Shift Goals  Clinical Goals: adequate pain control; remain free from further skin breakdown  Patient Goals:     Progress made toward(s) clinical / shift goals: waffle mattress in place, mepilex to BL elbows and heels, grey foam to oxy mask; asses dorina Q2-4H, administer pain medication as indicated     Patient is not progressing towards the following goals:       Problem: Urinary - Renal Perfusion  Goal: Ability to achieve and maintain adequate renal perfusion and functioning will improve  Outcome: Not Progressing     Problem: Fall Risk  Goal: Patient will remain free from falls  Outcome: Progressing

## 2021-05-29 NOTE — CONSULTS
"PSYCHOLOGICAL FOLLOW-UP:  Reason for admission: Syncope and collapse [R55]  Suicidal ideation [R45.851]  Sepsis due to Klebsiella pneumoniae (HCC) [A41.4]  Length of Visit: 30min    Legal status: Legal Status: Involuntary    Chief Complaint: \"they go back and forth\"    HPI: Met with the patient to assess risk level and provide crisis intervention services. Patient presented with a very tired affect and reported a \"not good\" mood. He had recently been given Dilaudid for his dressing change. The patient continues to endorse chronic suicidal ideations however the intensity and severity have decreased. He does not have a plan and is more hopeful that his mood will improve as his pain improves. Agreed to continue to monitor his mood and SI with the goal of discharging home from the hospital.     Psychiatric Examination:  Vitals: Blood pressure 106/69, pulse (!) 109, temperature 36.6 °C (97.9 °F), temperature source Temporal, resp. rate 17, height 1.829 m (6'), weight 113 kg (249 lb 1.9 oz), SpO2 95 %.  Musculoskeletal: normal psychomotor activity, no tics or unusual mannerisms noted  Appearance and Eye Contact: appropriate dress and grooming. Behavior is calm, cooperative,  appropriate eye contact  Attention/Alertness: Alert  Thought Process: Linear, Logical and Goal Directed    Thought Content: No psychotic processes noted  Speech: Clear with normal rate and rhythm  Mood: \"not good\"            Affect: tired         SI/HI: Denies current plan and intent. Does have chronic SI    Memory: Recent and remote memory appear intact    Orientation: alert, oriented to person, place and time  Insight into symptoms: fair  Judgement into symptoms:fair    ASSESSMENT: While the patient continues to endorse suicidal thoughts, they have become less severe and his mood more hopeful. He does not have a plan or clear intent to commit suicide. He is in a lot of pain so discussed monitoring his mood and SI as he continues to physically " recover given his recent history of suicidality.     DSM5 Diagnostic Considerations:   Unspecified Personality Disorder  Major Depressive Disorder, recurrent, severe, with psychotic features    PLAN:  Legal status: Involuntary     Anticipate F/U within 72 hours.     Tele-sitter with q15 checks. Upgrade to 1:1 sitter if he engages in any self-harming or suicidal behavior or gesture.      Limit conversations with the patient to only as needed interactions as his suicidality can worsen with perceived negative interactions with staff.      No changes to legal hold restrictions except patient can now walk around unit for the purpose of healing from recent surgery.       -please continue to refer patient to inpatient psychiatric hospitals    Records reviewed: yes  Will continue to follow  Thank you for the consult.    Madhuri Fernandez, Ph.D.

## 2021-05-29 NOTE — PROGRESS NOTES
[facility] Medicine Daily Progress Note    Date of Service  5/29/2021    Chief Complaint  56 y.o. male admitted 2/9/2021 with Syncope.    Hospital Course  Admitted with syncope, he was noted to be bradycardic and hypotensive.  He has known history of permanent atrial fibrillation and he was on diltiazem, digoxin and metoprolol.  These medications were held.  Later on he was restarted on low-dose Toprol and Digoxin.  He was also started on anticoagulation with Eliquis.  He also had a possible history of adrenal insufficiency, he was already on Florinef and midodrine.  Midodrine was discontinued and he was started on cortisol. Further work-up showed that he had gram-negative bacteremia as well as urinary tract infection and acute kidney injury.  He was initially placed on IV Zosyn, later changed to oral Bactrim to finish the course.  He was carefully hydrated with IV fluids.  On admission he also expressed suicidal ideation.  Psychiatry was consulted the case, he was placed on a legal hold.  He was noted to have severe depression.  He has been placed on medication the form of Geodon, Paxil, Klonopin.  He has a colostomy in place, which the patient believes contributes to his severe depression because of ostomy care.  He had history of bowel perforation and splenic fluid collection requiring Segmental colectomy, colostomy creation, mobilization of splenic flexure, wound VAC placement, irrigation and debridement of flank wound on 11/10/2020.  Surgery was consulted on the case, and he underwent exploratory laparotomy and colostomy takedown and closure on 5/27/2021.    Interval Problem Update  Colostomy - pain mostly controlled, discussed case with Surgery  Suicidal - perrsistent thoughts, but less, no clear suicidal plan  Afib - rate   Diabetes -   SRUTHI - crea went up to 2.2  Hypothyroid - TSH 6.9    Consultants/Specialty  Cardiology  Psychiatry  Surgery    Code Status  Full Code    Disposition  Legal Hold  [facility]  vs Inpatient psychiatry vs Group home    Review of Systems  Review of Systems   Constitutional: Positive for malaise/fatigue. Negative for chills, diaphoresis and fever.   HENT: Negative for congestion, hearing loss and sore throat.    Eyes: Negative for blurred vision.   Respiratory: Negative for cough, shortness of breath and wheezing.    Cardiovascular: Negative for chest pain, palpitations and leg swelling.   Gastrointestinal: Positive for abdominal pain. Negative for diarrhea, heartburn, nausea and vomiting.   Genitourinary: Negative for dysuria, flank pain and hematuria.   Musculoskeletal: Negative for back pain, joint pain, myalgias and neck pain.   Skin: Negative for rash.   Neurological: Positive for weakness. Negative for dizziness, sensory change, speech change, focal weakness and headaches.   Psychiatric/Behavioral: Positive for depression and suicidal ideas. Negative for hallucinations. The patient is not nervous/anxious.       Physical Exam  Temp:  [35.8 °C (96.5 °F)-36.8 °C (98.2 °F)] 36.7 °C (98.1 °F)  Pulse:  [] 110  Resp:  [15-18] 18  BP: ()/(40-77) 101/59  SpO2:  [93 %-98 %] 93 %    Physical Exam  Vitals and nursing note reviewed.   Constitutional:       Appearance: He is obese.   HENT:      Head: Normocephalic and atraumatic.      Nose: No congestion.      Mouth/Throat:      Mouth: Mucous membranes are moist.   Eyes:      Extraocular Movements: Extraocular movements intact.      Conjunctiva/sclera: Conjunctivae normal.      Pupils: Pupils are equal, round, and reactive to light.   Cardiovascular:      Rate and Rhythm: Normal rate. Rhythm irregularly irregular.   Pulmonary:      Effort: Pulmonary effort is normal.      Breath sounds: Normal breath sounds.   Abdominal:      General: There is no distension.      Tenderness: There is abdominal tenderness. There is no guarding or rebound.      Comments: Dressing in place   Musculoskeletal:      Cervical back: No muscular tenderness.       Right lower leg: Edema present.      Left lower leg: Edema present.   Skin:     General: Skin is warm and dry.   Neurological:      General: No focal deficit present.      Mental Status: He is alert and oriented to person, place, and time.      Cranial Nerves: No cranial nerve deficit.   Psychiatric:         Mood and Affect: Mood is not anxious.         Speech: Speech normal.         Thought Content: Thought content is not paranoid or delusional. Thought content includes suicidal ideation. Thought content does not include homicidal ideation. Thought content does not include homicidal or suicidal plan.         Judgment: Judgment is not impulsive.       Fluids    Intake/Output Summary (Last 24 hours) at 5/29/2021 1133  Last data filed at 5/29/2021 0800  Gross per 24 hour   Intake 3439.75 ml   Output 295 ml   Net 3144.75 ml       Laboratory  Recent Labs     05/28/21  0424 05/29/21  0437   WBC 17.9* 16.3*   RBC 5.32 5.07   HEMOGLOBIN 15.4 14.7   HEMATOCRIT 47.6 47.3   MCV 89.5 93.3   MCH 28.9 29.0   MCHC 32.4* 31.1*   RDW 47.5 51.7*   PLATELETCT 236 195   MPV 10.7 11.0     Recent Labs     05/28/21  0424 05/29/21  0437   SODIUM 134* 134*   POTASSIUM 4.5 5.0   CHLORIDE 100 100   CO2 24 24   GLUCOSE 166* 137*   BUN 23* 32*   CREATININE 1.24 2.28*   CALCIUM 8.9 9.2                   Imaging  EC-ECHOCARDIOGRAM COMPLETE W/ CONT   Final Result      DX-CHEST-PORTABLE (1 VIEW)   Final Result      1.  Minor asymmetric interstitial opacity in the right lower lobe which could represent minor interstitial edema, pneumonia, or fibrotic change.      2.  No left lung consolidation.      3.  No evidence of congestive failure.      [id number] VIEW   Final Result         No specific finding to suggest small bowel obstruction.      [id number] VIEW   Final Result         No significant interval change.      DX-CHEST-LIMITED (1 VIEW)   Final Result         Diffuse interstitial prominence could relate to mild pulmonary edema or atypical  infection      [id number] VIEW   Final Result      Increased colonic gas without definite bowel obstruction.      US-RENAL   Final Result      No evidence of hydronephrosis.      Lobulated kidneys bilaterally.      CT-ABDOMEN-PELVIS WITH   Final Result      1.  There is no evidence of small bowel obstruction.   2.  There is a left lower quadrant ostomy.   3.  There is fluid distention of the colon distal to the surgical material in the left mid descending colon extending all the way to the rectum. There is no pneumatosis or free air.   4.  There is cholelithiasis without biliary dilatation.   5.  There has been interval removal of the drainage catheter anterior to the spleen with minimal hypodense area in the anterior spleen again noted. There is no new fluid collection.      IR-US GUIDED PIV   Final Result    Ultrasound-guided PERIPHERAL IV INSERTION performed by    qualified nursing staff as above.      EC-ECHOCARDIOGRAM COMPLETE W/O CONT   Final Result      DX-LUMBAR SPINE-2 OR 3 VIEWS   Final Result      Moderate compression deformity of T11 is new compared to 2018.      Mild wedge deformity of T12 is unchanged.      Degenerative changes including facet arthropathy.      Mild retrolisthesis of L5 on S1 and L3 on L4.              Assessment/Plan  * Syncope- (present on admission)  Assessment & Plan  secondary to Hypotension and Bradycardia    UTI (urinary tract infection)- (present on admission)  Assessment & Plan  Completed 10-day course of Bactrim    Gram-negative bacteremia- (present on admission)  Assessment & Plan  Completed 10-day course of Bactrim    Major depressive disorder, recurrent, severe with psychotic features (HCC)- (present on admission)  Assessment & Plan  Geodon, Paxil, Klonopin    Colostomy present (HCC)- (present on admission)  Assessment & Plan  Exploratory laparotomy with colostomy takedown and closure 5/27/2021  Segmental colectomy, colostomy creation, mobilization of splenic flexure,  wound VAC placement, irrigation and debridement of flank wound on 11/10/2020.  Pain control  Encourage I-S    Suicidal ideation- (present on admission)  Assessment & Plan  Legal Hold   Psychiatry following    Leukocytosis- (present on admission)  Assessment & Plan  Follow cbc    Obstructive uropathy- (present on admission)  Assessment & Plan  Bladder scan and straight cath prn    Permanent atrial fibrillation (HCC)- (present on admission)  Assessment & Plan  Toprol  Hold Digoxin with SRUTHI  Resume Eliquis when cleared by Surgery     Chronic heart failure with preserved ejection fraction (HCC)- (present on admission)  Assessment & Plan  Toprol  follow probnp    Type 2 diabetes mellitus with hyperglycemia, with long-term current use of insulin (HCC)- (present on admission)  Assessment & Plan  Follow bmp  Currently not requiring Insulin    Hypothyroidism- (present on admission)  Assessment & Plan  Increase Levothyroxine to 125 mcg  Check TSH on 6/29/2021    SRUTHI (acute kidney injury) (HCC)- (present on admission)  Assessment & Plan  Continue cautious IVF hydration  Follow bmp    Adrenal insufficiency (HCC)- (present on admission)  Assessment & Plan  Possible?  Cortef    Debility- (present on admission)  Assessment & Plan  PT and OT   vit d level pending    Orthostatic hypotension- (present on admission)  Assessment & Plan  Florinef    Hypomagnesemia- (present on admission)  Assessment & Plan  IV Mg given  Follow level    Hypokalemia- (present on admission)  Assessment & Plan  Follow bmp    Hyponatremia- (present on admission)  Assessment & Plan  IVF NS  Follow bmp    Chronic venous insufficiency of lower extremity- (present on admission)  Assessment & Plan  compression stockings.    Mixed hyperlipidemia- (present on admission)  Assessment & Plan  Atorvastatin.     Lower limb amputation, great toe (HCC)- (present on admission)  Assessment & Plan  monitor    Class 2 severe obesity with serious comorbidity and body mass  index (BMI) of 35.0 to 35.9 in adult (HCC)- (present on admission)  Assessment & Plan  Body mass index is 35.19 kg/m².       VTE prophylaxis: Lovenox

## 2021-05-29 NOTE — PROCEDURES
Vascular Access Team    Date of Insertion: May 29, 2021  Arm Circumference: 44 cm  Internal length: 13 cm  External Length: 0 cm (hub)  Vein Occupancy %: 29%  Reason for Midline: Access  Labs: WBC 16.3, PLT 1.95, BUN 32, Cr 2.28, GFR 30, INR 1.09 (5/25/21)  PER DR. AHMET FABIAN TO PROCEED WITH MIDLINE PLACEMENT DESPITE KIDNEY FUNCTION.  WILL NOT BE CONSULTING NEPHROLOGY.  POOR KIDNEY FUNCTION DUE TO DEHYDRATION.  PATIENT RECEIVED BOLUS.    Orders confirmed, vessel patency confirmed with ultrasound. Risks and benefits of procedure explained to patient and education regarding line associated bloodstream infections provided. Questions answered.     Power Midline placed in RUE per licensed provider order with ultrasound guidance. 4 Fr, single lumen Power Midline placed in brachial vein after 1 attempt(s). 2 mL of 1% lidocaine injected intradermally, 21 gauge microintroducer needle and modified Seldinger technique used. 13 cm catheter inserted with good blood return. Secured at 0 cm (hub) marker. Each lumen flushed without resistance with 10 mL 0.9% normal saline. Midline secured with Biopatch and Tegaderm.     Midline placement is confirmed by nurse using ultrasound and ability to flush and draw blood. Midline is appropriate for use at this time.  Patient tolerated procedure well, without complications.  No X-ray is needed for placement confirmation.  Patient condition relayed to unit RN or ordering physician via this post procedure note in the EMR.     Ultrasound images uploaded to PACS and viewable in the EMR - yes  Ultrasound imaged printed and placed in paper chart - no     BARD Power Midline ref # [id number], Lot # [id number], Expiration Date May 31, 2022

## 2021-05-29 NOTE — PROGRESS NOTES
PIV infiltrated, orders placed for US PIV.  Spoke with VAT team, she states he has gone through multiple US IVs, would benefit from a midline.  Paged Dr Lazo for orders.    1517:  MD will place orders.

## 2021-05-29 NOTE — CARE PLAN
The patient is Watcher - Medium risk of patient condition declining or worsening    Shift Goals  Clinical Goals: Maintain safety  Patient Goals: Adequate pain control    Progress made toward(s) clinical / shift goals:  telisitter in place, pt medicated per MAR for pain    Problem: Provide Safe Environment  Goal: Suicide environmental safety, protocols, policies, and practices will be implemented  Outcome: Progressing       Patient is not progressing towards the following goals:      Problem: Fluid Volume  Goal: Fluid volume balance will be maintained  Outcome: Not Progressing   Pt has low UO, updated MD

## 2021-05-29 NOTE — PROGRESS NOTES
Surgery    Postop day 2 after colostomy takedown.  Patient states he feels better today, less pain  White count down  No flatus yet  Taking clears but does not want to eat yet    No other acute events    /69   Pulse (!) 109   Temp 36.6 °C (97.9 °F) (Temporal)   Resp 17   Ht 1.829 m (6')   Wt 113 kg (249 lb 1.9 oz)   SpO2 95%   BMI 33.79 kg/m²     Patient sitting up in bed  No pallor  Abdomen soft with incisional tenderness.  Packing removed.  Small skin bleeder above umbilicus cauterized with silver nitrate stick  Wound covered with dry sterile dressing    Recent Labs     05/28/21  0424 05/29/21  0437   WBC 17.9* 16.3*   RBC 5.32 5.07   HEMOGLOBIN 15.4 14.7   HEMATOCRIT 47.6 47.3   MCV 89.5 93.3   MCH 28.9 29.0   RDW 47.5 51.7*   PLATELETCT 236 195   MPV 10.7 11.0   NEUTSPOLYS 84.60* 71.80   LYMPHOCYTES 7.60* 15.30*   MONOCYTES 6.00 7.90   EOSINOPHILS 0.80 4.00   BASOPHILS 0.40 0.40     Assessment and plan:  56-year-old male with numerous medical comorbidities who is 2 days status post colostomy reversal.  No signs of sepsis  Awaiting return of bowel function  White count improving slowly: Trend  Continue ambulation, up to chair with meals  Okay to restart anticoagulation in the next day or so.  In the meantime, continue Lovenox  We will see if we can get an abdominal binder to provide some wound compression and prevent bleeding when they restart anticoagulation    Medical management per hospitalists    Dr. Baeza will be seen patient over the weekend

## 2021-05-29 NOTE — PROGRESS NOTES
Assumed care of patient from night shift RN.  Patient is alert and oriented times 4, states pain of 6/10, medicated per MAR.  Legal hold, telesitter in use.  Patient allowed call light and bible only in room.  VSS /69   Pulse (!) 109   Temp 36.6 °C (97.9 °F) (Temporal)   Resp 17   Ht 1.829 m (6')   Wt 113 kg (249 lb 1.9 oz)   SpO2 95%   BMI 33.79 kg/m²   PIV in the LAC, patent and running NS at 100mL.hr.  On RA with saturations in the mid 90s.   in use.  Irregular heart rate noted, history of chronic Afib.  Last BM 5/26, - flatus per patient.  Clear liquid diet, tolerating well.  Zabala catheter, stat lock in use.  Low urine output overnight, will monitor closely.   Dressing to the mid abdomen, small old drainage noted.  MD to change packing at bedside today.  Redness noted under pannus, barrier cream in use.  R great toe amputated, healed.  Patient was reported to be a 1 person assist, refusing mobilization at this time.  High fall risk, bed alarm in use.  POC discussed for the day, bed is locked and in the lowest position, call light is within reach.  All needs are met at this time, hourly rounding is in place.

## 2021-05-29 NOTE — CARE PLAN
Problem: Knowledge Deficit - Standard  Goal: Patient and family/care givers will demonstrate understanding of plan of care, disease process/condition, diagnostic tests and medications  Outcome: Progressing     Problem: Depression  Goal: Patient and family/caregiver will verbalize accurate information about at least two of the possible causes of depression, three-four of the signs and symptoms of depression  Outcome: Progressing     Problem: Provide Safe Environment  Goal: Suicide environmental safety, protocols, policies, and practices will be implemented  Outcome: Progressing     Problem: Psychosocial  Goal: Patient's ability to identify and develop effective coping behaviors will improve  Outcome: Progressing  Goal: Patient's ability to identify and utilize available support systems will improve  Outcome: Progressing     Problem: Hemodynamics  Goal: Patient's hemodynamics, fluid balance and neurologic status will be stable or improve  Outcome: Progressing     Problem: Fluid Volume  Goal: Fluid volume balance will be maintained  Outcome: Progressing     Problem: Urinary - Renal Perfusion  Goal: Ability to achieve and maintain adequate renal perfusion and functioning will improve  Outcome: Progressing     Problem: Respiratory  Goal: Patient will achieve/maintain optimum respiratory ventilation and gas exchange  Outcome: Progressing     Problem: Physical Regulation  Goal: Diagnostic test results will improve  Outcome: Progressing  Goal: Signs and symptoms of infection will decrease  Outcome: Progressing     Problem: Bowel Elimination  Goal: Establish and maintain regular bowel function  Outcome: Progressing     Problem: Fall Risk  Goal: Patient will remain free from falls  Outcome: Progressing   The patient is Stable - Low risk of patient condition declining or worsening    Shift Goals  Clinical Goals: Maintain safety  Patient Goals: Pain control    Progress made toward(s) clinical / shift goals:  pain control,  skin integrity    Patient is not progressing towards the following goals:

## 2021-05-29 NOTE — PROGRESS NOTES
2 RN skin check complete with ELISA villanueva    Ears red with slow blanching BL  BL elbows red with blanching  Pannus with moisture breakdown, barrier cream in place  Zachary to upper back, CHARLEY  BL heels red with blanching    Waffle mattress in place, Q2H turns, mepilex applied to BL heels and elbows, grey foam applied to mask strings

## 2021-05-29 NOTE — PROGRESS NOTES
Pt is A&O 4  Pain controlled at this time on current regimen, pt medicated per MAR   Denies nausea  Tolerating a CLD  Incision to abdomen, covered with ABD dressing and tape.  Small amount of serosanguinous drainage present  Low UO per frye catheter  - flatus  - BM  Up x1 assist  SCD's on  Bed alarm on, pt high fall risk per juli chávez  Pt on LH, teli sitter in place throughout shift.  Operating appropriately.  Reviewed plan of care with patient, bed in lowest position and locked, pt resting comfortably now, call light within reach, all needs met at this time. Interventions will be executed per plan of care

## 2021-05-30 LAB
ANION GAP SERPL CALC-SCNC: 8 MMOL/L (ref 7–16)
BASOPHILS # BLD AUTO: 0.4 % (ref 0–1.8)
BASOPHILS # BLD: 0.06 K/UL (ref 0–0.12)
BUN SERPL-MCNC: 37 MG/DL (ref 8–22)
CALCIUM SERPL-MCNC: 8.6 MG/DL (ref 8.5–10.5)
CHLORIDE SERPL-SCNC: 103 MMOL/L (ref 96–112)
CO2 SERPL-SCNC: 26 MMOL/L (ref 20–33)
CREAT SERPL-MCNC: 1.84 MG/DL (ref 0.5–1.4)
DIGOXIN SERPL-MCNC: 0.4 NG/ML (ref 0.8–2)
EOSINOPHIL # BLD AUTO: 0.67 K/UL (ref 0–0.51)
EOSINOPHIL NFR BLD: 4.7 % (ref 0–6.9)
ERYTHROCYTE [DISTWIDTH] IN BLOOD BY AUTOMATED COUNT: 49.9 FL (ref 35.9–50)
GLUCOSE SERPL-MCNC: 133 MG/DL (ref 65–99)
HCT VFR BLD AUTO: 38 % (ref 42–52)
HGB BLD-MCNC: 11.8 G/DL (ref 14–18)
IMM GRANULOCYTES # BLD AUTO: 0.08 K/UL (ref 0–0.11)
IMM GRANULOCYTES NFR BLD AUTO: 0.6 % (ref 0–0.9)
LYMPHOCYTES # BLD AUTO: 2.37 K/UL (ref 1–4.8)
LYMPHOCYTES NFR BLD: 16.6 % (ref 22–41)
MAGNESIUM SERPL-MCNC: 2.1 MG/DL (ref 1.5–2.5)
MCH RBC QN AUTO: 28.4 PG (ref 27–33)
MCHC RBC AUTO-ENTMCNC: 31.1 G/DL (ref 33.7–35.3)
MCV RBC AUTO: 91.6 FL (ref 81.4–97.8)
MONOCYTES # BLD AUTO: 1.12 K/UL (ref 0–0.85)
MONOCYTES NFR BLD AUTO: 7.9 % (ref 0–13.4)
NEUTROPHILS # BLD AUTO: 9.94 K/UL (ref 1.82–7.42)
NEUTROPHILS NFR BLD: 69.8 % (ref 44–72)
NRBC # BLD AUTO: 0 K/UL
NRBC BLD-RTO: 0 /100 WBC
NT-PROBNP SERPL IA-MCNC: 2524 PG/ML (ref 0–125)
PLATELET # BLD AUTO: 205 K/UL (ref 164–446)
PMV BLD AUTO: 11 FL (ref 9–12.9)
POTASSIUM SERPL-SCNC: 4.1 MMOL/L (ref 3.6–5.5)
RBC # BLD AUTO: 4.15 M/UL (ref 4.7–6.1)
SODIUM SERPL-SCNC: 137 MMOL/L (ref 135–145)
WBC # BLD AUTO: 14.2 K/UL (ref 4.8–10.8)

## 2021-05-30 PROCEDURE — 80162 ASSAY OF DIGOXIN TOTAL: CPT

## 2021-05-30 PROCEDURE — 770001 HCHG ROOM/CARE - MED/SURG/GYN PRIV*

## 2021-05-30 PROCEDURE — 700102 HCHG RX REV CODE 250 W/ 637 OVERRIDE(OP): Performed by: NURSE PRACTITIONER

## 2021-05-30 PROCEDURE — 83735 ASSAY OF MAGNESIUM: CPT

## 2021-05-30 PROCEDURE — 80048 BASIC METABOLIC PNL TOTAL CA: CPT

## 2021-05-30 PROCEDURE — 700102 HCHG RX REV CODE 250 W/ 637 OVERRIDE(OP): Performed by: FAMILY MEDICINE

## 2021-05-30 PROCEDURE — 700102 HCHG RX REV CODE 250 W/ 637 OVERRIDE(OP): Performed by: SURGERY

## 2021-05-30 PROCEDURE — 700102 HCHG RX REV CODE 250 W/ 637 OVERRIDE(OP): Performed by: STUDENT IN AN ORGANIZED HEALTH CARE EDUCATION/TRAINING PROGRAM

## 2021-05-30 PROCEDURE — A9270 NON-COVERED ITEM OR SERVICE: HCPCS | Performed by: SURGERY

## 2021-05-30 PROCEDURE — 700111 HCHG RX REV CODE 636 W/ 250 OVERRIDE (IP): Performed by: SURGERY

## 2021-05-30 PROCEDURE — 83880 ASSAY OF NATRIURETIC PEPTIDE: CPT

## 2021-05-30 PROCEDURE — A9270 NON-COVERED ITEM OR SERVICE: HCPCS | Performed by: STUDENT IN AN ORGANIZED HEALTH CARE EDUCATION/TRAINING PROGRAM

## 2021-05-30 PROCEDURE — A9270 NON-COVERED ITEM OR SERVICE: HCPCS | Performed by: NURSE PRACTITIONER

## 2021-05-30 PROCEDURE — 85025 COMPLETE CBC W/AUTO DIFF WBC: CPT

## 2021-05-30 PROCEDURE — A9270 NON-COVERED ITEM OR SERVICE: HCPCS | Performed by: HOSPITALIST

## 2021-05-30 PROCEDURE — 99232 SBSQ HOSP IP/OBS MODERATE 35: CPT | Performed by: FAMILY MEDICINE

## 2021-05-30 PROCEDURE — A9270 NON-COVERED ITEM OR SERVICE: HCPCS | Performed by: FAMILY MEDICINE

## 2021-05-30 PROCEDURE — 700102 HCHG RX REV CODE 250 W/ 637 OVERRIDE(OP): Performed by: HOSPITALIST

## 2021-05-30 RX ADMIN — ACETAMINOPHEN 650 MG: 325 TABLET, FILM COATED ORAL at 13:37

## 2021-05-30 RX ADMIN — METOPROLOL SUCCINATE 12.5 MG: 25 TABLET, EXTENDED RELEASE ORAL at 05:14

## 2021-05-30 RX ADMIN — POLYETHYLENE GLYCOL 3350 1 PACKET: 17 POWDER, FOR SOLUTION ORAL at 05:14

## 2021-05-30 RX ADMIN — CLONAZEPAM 1 MG: 1 TABLET ORAL at 17:27

## 2021-05-30 RX ADMIN — CLONAZEPAM 0.5 MG: 0.5 TABLET ORAL at 08:45

## 2021-05-30 RX ADMIN — FLUDROCORTISONE ACETATE 0.1 MG: 0.1 TABLET ORAL at 05:17

## 2021-05-30 RX ADMIN — DOCUSATE SODIUM 100 MG: 100 CAPSULE, LIQUID FILLED ORAL at 05:14

## 2021-05-30 RX ADMIN — HYDROCORTISONE 10 MG: 10 TABLET ORAL at 05:17

## 2021-05-30 RX ADMIN — ZIPRASIDONE HYDROCHLORIDE 60 MG: 60 CAPSULE ORAL at 08:45

## 2021-05-30 RX ADMIN — ZIPRASIDONE HYDROCHLORIDE 60 MG: 60 CAPSULE ORAL at 20:23

## 2021-05-30 RX ADMIN — DOCUSATE SODIUM 50 MG AND SENNOSIDES 8.6 MG 2 TABLET: 8.6; 5 TABLET, FILM COATED ORAL at 05:14

## 2021-05-30 RX ADMIN — ENOXAPARIN SODIUM 40 MG: 40 INJECTION SUBCUTANEOUS at 05:14

## 2021-05-30 RX ADMIN — ATORVASTATIN CALCIUM 40 MG: 40 TABLET, FILM COATED ORAL at 17:27

## 2021-05-30 RX ADMIN — PAROXETINE HYDROCHLORIDE 20 MG: 20 TABLET, FILM COATED ORAL at 08:45

## 2021-05-30 RX ADMIN — ACETAMINOPHEN 650 MG: 325 TABLET, FILM COATED ORAL at 05:14

## 2021-05-30 RX ADMIN — HYDROCORTISONE 5 MG: 10 TABLET ORAL at 17:27

## 2021-05-30 RX ADMIN — LEVOTHYROXINE SODIUM 125 MCG: 0.12 TABLET ORAL at 05:14

## 2021-05-30 ASSESSMENT — ENCOUNTER SYMPTOMS
HALLUCINATIONS: 0
ABDOMINAL PAIN: 1
COUGH: 0
FLANK PAIN: 0
NERVOUS/ANXIOUS: 0
SENSORY CHANGE: 0
NECK PAIN: 0
NAUSEA: 0
BACK PAIN: 0
FOCAL WEAKNESS: 0
DIZZINESS: 0
HEADACHES: 0
MYALGIAS: 0
SORE THROAT: 0
DIARRHEA: 0
DEPRESSION: 1
DIAPHORESIS: 0
CHILLS: 0
SPEECH CHANGE: 0
WEAKNESS: 1
HEARTBURN: 0
SHORTNESS OF BREATH: 0
WHEEZING: 0
FEVER: 0
VOMITING: 0
PALPITATIONS: 0
BLURRED VISION: 0

## 2021-05-30 ASSESSMENT — PAIN DESCRIPTION - PAIN TYPE
TYPE: ACUTE PAIN
TYPE: ACUTE PAIN

## 2021-05-30 NOTE — PROGRESS NOTES
2 RN skin check complete with enma RN    Devices in place . RUE midline , binder  Skin assessed under devices . yes  Confirmed pressure ulcers found on . N/a  B/l ear redness. Blanching  abd dressing CDI. Binder in place  Upper back tear Hong  Pannus and groin with moisture fissure  Rt great toe amputated  B/l feet. Dry and flaky.       The following interventions in place. q2 turn in place. Waffle mattress in place. mepilex to b/l heels and b/l elbows.

## 2021-05-30 NOTE — CARE PLAN
The patient is Watcher - Medium risk of patient condition declining or worsening    Shift Goals  Clinical Goals: increase activity tolerance  Patient Goals: Pain control    Progress made toward(s) clinical / shift goals:    Patient is not progressing towards the following goals: pt drowsy most of the night. Unable to participate in any activity.       Problem: Psychosocial  Goal: Patient's ability to identify and develop effective coping behaviors will improve  Outcome: Progressing     Problem: Bowel Elimination  Goal: Establish and maintain regular bowel function  Outcome: Progressing

## 2021-05-30 NOTE — PROGRESS NOTES
Progress Note    Author:  Laura Baeza MD    Date & Time:   5/30/2021   1:29 PM          Patient ID:             Name:             Sebastián Castro     YOB: 1965  Age:                 56 y.o.  male   MRN:               [id number]    ________________________________________________________________________  [facility]     Events:  ----------   No complaints  Tolerating clear  +flatus  No bowel movements    Exam:       Vitals:    05/30/21 0830   BP: (!) 94/64   Pulse: 98   Resp: 18   Temp: 36.4 °C (97.5 °F)   SpO2: 97%     SpO2  Min: 92 %  Max: 100 %  O2 (LPM)  Min: 0  Max: 10  Temp  Min: 35.8 °C (96.4 °F)  Max: 36.9 °C (98.5 °F)    Intake/Output Summary (Last 24 hours) at 5/30/2021 1329  Last data filed at 5/30/2021 0800  Gross per 24 hour   Intake 1500 ml   Output 700 ml   Net 800 ml     Output by Drain (mL) 05/28/21 0700 - 05/28/21 1859 05/28/21 1900 - 05/29/21 0659 05/29/21 0700 - 05/29/21 1859 05/29/21 1900 - 05/30/21 0659 05/30/21 0700 - 05/30/21 1329   Patient has no LDAs of requested type attached.       DIET ORDERS (From admission to next 24h)     Start     Ordered    05/28/21 1222  Diet Order Diet: Clear Liquid (high carb;); Tray Modifications (optional): No Sharps (Paperware)  ALL MEALS     Comments: Begin Low Fiber (GI Soft) diet as soon as alert on floor   Question Answer Comment   Diet: Clear Liquid high carb;   Tray Modifications (optional) No Sharps (Paperware)        05/28/21 1230                        Physical Exam  Nursing note reviewed.   Constitutional:       Appearance: Normal appearance. Alert, oriented x 4.NAD    Eyes:      Pupils: Pupils are equal, round, and reactive to light.      No scleral Icterus present  Cardiovascular:      Rate and Rhythm: Normal rate and regular rhythm. Extremities warm, well perfused no edema.   Pulmonary:      Effort: Pulmonary effort is normal.      Breath sounds: No wheezes or stridor  Abdominal:      General: Abdomen is  soft, Mildly tender. Abdominal binder in place      Recent Labs     05/28/21 0424 05/29/21 0437 05/30/21  0250   SODIUM 134* 134* 137   POTASSIUM 4.5 5.0 4.1   CHLORIDE 100 100 103   CO2 24 24 26   BUN 23* 32* 37*   CREATININE 1.24 2.28* 1.84*   MAGNESIUM 1.4* 2.2 2.1   PHOSPHORUS 5.7*  --   --    CALCIUM 8.9 9.2 8.6       Recent Labs     05/28/21 0424 05/29/21 0437 05/30/21  0250   ALTSGPT 37  --   --    ASTSGOT 29  --   --    ALKPHOSPHAT 106*  --   --    TBILIRUBIN 0.7  --   --    GLUCOSE 166* 137* 133*       Recent Labs     05/28/21 0424 05/29/21 0437 05/30/21  0250   RBC 5.32 5.07 4.15*   HEMOGLOBIN 15.4 14.7 11.8*   HEMATOCRIT 47.6 47.3 38.0*   PLATELETCT 236 195 205       Recent Labs     05/28/21 0424 05/29/21 0437 05/30/21 0250   WBC 17.9* 16.3* 14.2*   NEUTSPOLYS 84.60* 71.80 69.80   LYMPHOCYTES 7.60* 15.30* 16.60*   MONOCYTES 6.00 7.90 7.90   EOSINOPHILS 0.80 4.00 4.70   BASOPHILS 0.40 0.40 0.40   ASTSGOT 29  --   --    ALTSGPT 37  --   --    ALKPHOSPHAT 106*  --   --    TBILIRUBIN 0.7  --   --          ________________________________________________________________________      Patient Active Problem List   Diagnosis   • Gangrene of toe of right foot, s/p recent amputation (CMS-HCC)   • Hyponatremia   • Hypokalemia   • Class 2 severe obesity with serious comorbidity and body mass index (BMI) of 35.0 to 35.9 in adult ([facility])   • Hypomagnesemia   • Lower limb amputation, great toe (HCC)   • Mixed hyperlipidemia   • Diabetic infection of right foot ([facility])   • Venous stasis dermatitis of both lower extremities   • Psychogenic nonepileptic seizure   • Orthostatic hypotension   • Lumbar compression fracture ([facility])   • Fall from ground level   • Suicidal ideation   • Debility   • Adrenal insufficiency ([facility])   • SRUTHI (acute kidney injury) ([facility])   • Hypothyroidism   • Colostomy present ([facility])   • Syncope   • Major depressive disorder, recurrent, severe with psychotic features ([facility])   • Type 2 diabetes mellitus  with hyperglycemia, with long-term current use of insulin ([facility])   • Chronic heart failure with preserved ejection fraction ([facility])   • Chronic venous insufficiency of lower extremity   • Permanent atrial fibrillation ([facility])   • Gram-negative bacteremia   • Obstructive uropathy   • Leukocytosis   • UTI (urinary tract infection)       Acute Issues/Plan:  WBC trending down  Vitals stable  Continue Clears until bowel function returns

## 2021-05-30 NOTE — PROGRESS NOTES
[facility] Medicine Daily Progress Note    Date of Service  5/30/2021    Chief Complaint  56 y.o. male admitted 2/9/2021 with Syncope.    Hospital Course  Admitted with syncope, he was noted to be bradycardic and hypotensive.  He has known history of permanent atrial fibrillation and he was on diltiazem, digoxin and metoprolol.  These medications were held.  Later on he was restarted on low-dose Toprol and Digoxin.  He was also started on anticoagulation with Eliquis.  He also had a possible history of adrenal insufficiency, he was already on Florinef and midodrine.  Midodrine was discontinued and he was started on cortisol. Further work-up showed that he had gram-negative bacteremia as well as urinary tract infection and acute kidney injury.  He was initially placed on IV Zosyn, later changed to oral Bactrim to finish the course.  He was carefully hydrated with IV fluids.  On admission he also expressed suicidal ideation.  Psychiatry was consulted the case, he was placed on a legal hold.  He was noted to have severe depression.  He has been placed on medication the form of Geodon, Paxil, Klonopin.  He has a colostomy in place, which the patient believes contributes to his severe depression because of ostomy care.  He had history of bowel perforation and splenic fluid collection requiring Segmental colectomy, colostomy creation, mobilization of splenic flexure, wound VAC placement, irrigation and debridement of flank wound on 11/10/2020.  Surgery was consulted on the case, and he underwent exploratory laparotomy and colostomy takedown and closure on 5/27/2021.    Interval Problem Update  Colostomy - pain mostly controlled  Suicidal - less thoughts  Afib - rate   Diabetes -   SRUTHI - crea trended down to 1.8    Consultants/Specialty  Cardiology  Psychiatry  Surgery    Code Status  Full Code    Disposition  Legal Hold  SNF vs Inpatient psychiatry vs Group home    Review of Systems  Review of Systems    Constitutional: Positive for malaise/fatigue. Negative for chills, diaphoresis and fever.   HENT: Negative for congestion, hearing loss and sore throat.    Eyes: Negative for blurred vision.   Respiratory: Negative for cough, shortness of breath and wheezing.    Cardiovascular: Negative for chest pain, palpitations and leg swelling.   Gastrointestinal: Positive for abdominal pain. Negative for diarrhea, heartburn, nausea and vomiting.   Genitourinary: Negative for dysuria, flank pain and hematuria.   Musculoskeletal: Negative for back pain, joint pain, myalgias and neck pain.   Skin: Negative for rash.   Neurological: Positive for weakness. Negative for dizziness, sensory change, speech change, focal weakness and headaches.   Psychiatric/Behavioral: Positive for depression and suicidal ideas. Negative for hallucinations. The patient is not nervous/anxious.       Physical Exam  Temp:  [36.1 °C (96.9 °F)-36.7 °C (98.1 °F)] 36.4 °C (97.5 °F)  Pulse:  [] 98  Resp:  [18] 18  BP: ()/(59-80) 94/64  SpO2:  [93 %-98 %] 97 %    Physical Exam  Vitals and nursing note reviewed.   Constitutional:       Appearance: He is obese.   HENT:      Head: Normocephalic and atraumatic.      Nose: No congestion.      Mouth/Throat:      Mouth: Mucous membranes are moist.   Eyes:      Extraocular Movements: Extraocular movements intact.      Conjunctiva/sclera: Conjunctivae normal.      Pupils: Pupils are equal, round, and reactive to light.   Cardiovascular:      Rate and Rhythm: Normal rate. Rhythm irregularly irregular.   Pulmonary:      Effort: Pulmonary effort is normal.      Breath sounds: Normal breath sounds.   Abdominal:      General: There is no distension.      Tenderness: There is abdominal tenderness. There is no guarding or rebound.      Comments: Dressing in place   Musculoskeletal:      Cervical back: No muscular tenderness.      Right lower leg: Edema present.      Left lower leg: Edema present.   Skin:      General: Skin is warm and dry.   Neurological:      General: No focal deficit present.      Mental Status: He is alert and oriented to person, place, and time.      Cranial Nerves: No cranial nerve deficit.   Psychiatric:         Mood and Affect: Mood is not anxious.         Speech: Speech normal.         Thought Content: Thought content is not paranoid or delusional. Thought content includes suicidal ideation. Thought content does not include homicidal ideation. Thought content does not include homicidal or suicidal plan.         Judgment: Judgment is not impulsive.       Fluids    Intake/Output Summary (Last 24 hours) at 5/30/2021 0959  Last data filed at 5/30/2021 0800  Gross per 24 hour   Intake 1500 ml   Output 1000 ml   Net 500 ml       Laboratory  Recent Labs     05/28/21 0424 05/29/21  0437 05/30/21  0250   WBC 17.9* 16.3* 14.2*   RBC 5.32 5.07 4.15*   HEMOGLOBIN 15.4 14.7 11.8*   HEMATOCRIT 47.6 47.3 38.0*   MCV 89.5 93.3 91.6   MCH 28.9 29.0 28.4   MCHC 32.4* 31.1* 31.1*   RDW 47.5 51.7* 49.9   PLATELETCT 236 195 205   MPV 10.7 11.0 11.0     Recent Labs     05/28/21 0424 05/29/21  0437 05/30/21  0250   SODIUM 134* 134* 137   POTASSIUM 4.5 5.0 4.1   CHLORIDE 100 100 103   CO2 24 24 26   GLUCOSE 166* 137* 133*   BUN 23* 32* 37*   CREATININE 1.24 2.28* 1.84*   CALCIUM 8.9 9.2 8.6                   Imaging  IR-MIDLINE CATHETER INSERTION WO GUIDANCE > AGE 3   Final Result                  Ultrasound-guided midline placement performed by qualified nursing staff    as above.          EC-ECHOCARDIOGRAM COMPLETE W/ CONT   Final Result      DX-CHEST-PORTABLE (1 VIEW)   Final Result      1.  Minor asymmetric interstitial opacity in the right lower lobe which could represent minor interstitial edema, pneumonia, or fibrotic change.      2.  No left lung consolidation.      3.  No evidence of congestive failure.      [id number] VIEW   Final Result         No specific finding to suggest small bowel obstruction.       [id number] VIEW   Final Result         No significant interval change.      DX-CHEST-LIMITED (1 VIEW)   Final Result         Diffuse interstitial prominence could relate to mild pulmonary edema or atypical infection      [id number] VIEW   Final Result      Increased colonic gas without definite bowel obstruction.      US-RENAL   Final Result      No evidence of hydronephrosis.      Lobulated kidneys bilaterally.      CT-ABDOMEN-PELVIS WITH   Final Result      1.  There is no evidence of small bowel obstruction.   2.  There is a left lower quadrant ostomy.   3.  There is fluid distention of the colon distal to the surgical material in the left mid descending colon extending all the way to the rectum. There is no pneumatosis or free air.   4.  There is cholelithiasis without biliary dilatation.   5.  There has been interval removal of the drainage catheter anterior to the spleen with minimal hypodense area in the anterior spleen again noted. There is no new fluid collection.      IR-US GUIDED PIV   Final Result    Ultrasound-guided PERIPHERAL IV INSERTION performed by    qualified nursing staff as above.      EC-ECHOCARDIOGRAM COMPLETE W/O CONT   Final Result      DX-LUMBAR SPINE-2 OR 3 VIEWS   Final Result      Moderate compression deformity of T11 is new compared to 2018.      Mild wedge deformity of T12 is unchanged.      Degenerative changes including facet arthropathy.      Mild retrolisthesis of L5 on S1 and L3 on L4.              Assessment/Plan  * Syncope- (present on admission)  Assessment & Plan  secondary to Hypotension and Bradycardia    UTI (urinary tract infection)- (present on admission)  Assessment & Plan  Completed 10-day course of Bactrim    Gram-negative bacteremia- (present on admission)  Assessment & Plan  Completed 10-day course of Bactrim    Major depressive disorder, recurrent, severe with psychotic features (HCC)- (present on admission)  Assessment & Plan  Geodon, Paxil,  Klonopin    Colostomy present (HCC)- (present on admission)  Assessment & Plan  Exploratory laparotomy with colostomy takedown and closure 5/27/2021  Segmental colectomy, colostomy creation, mobilization of splenic flexure, wound VAC placement, irrigation and debridement of flank wound on 11/10/2020.  Pain control  Encourage I-S    Suicidal ideation- (present on admission)  Assessment & Plan  Legal Hold   Psychiatry following    Leukocytosis- (present on admission)  Assessment & Plan  Follow cbc    Obstructive uropathy- (present on admission)  Assessment & Plan  Bladder scan and straight cath prn    Permanent atrial fibrillation (HCC)- (present on admission)  Assessment & Plan  Toprol  Hold Digoxin with SRUTHI  Resume Eliquis when cleared by Surgery     Chronic heart failure with preserved ejection fraction (HCC)- (present on admission)  Assessment & Plan  Toprol  follow probnp    Type 2 diabetes mellitus with hyperglycemia, with long-term current use of insulin (HCC)- (present on admission)  Assessment & Plan  Follow bmp  Currently not requiring Insulin    Hypothyroidism- (present on admission)  Assessment & Plan  Increased Levothyroxine to 125 mcg  Check TSH on 6/29/2021    SRUTHI (acute kidney injury) (HCC)- (present on admission)  Assessment & Plan  Decrease IVF rate to 75 cc/hr  Follow bmp    Adrenal insufficiency (HCC)- (present on admission)  Assessment & Plan  Possible?  Cortef    Debility- (present on admission)  Assessment & Plan  PT and OT   vit d level pending    Orthostatic hypotension- (present on admission)  Assessment & Plan  Florinef    Hypomagnesemia- (present on admission)  Assessment & Plan  IV Mg given  Follow level    Hypokalemia- (present on admission)  Assessment & Plan  Follow bmp    Hyponatremia- (present on admission)  Assessment & Plan  IVF NS  Follow bmp    Chronic venous insufficiency of lower extremity- (present on admission)  Assessment & Plan  compression stockings.    Mixed  hyperlipidemia- (present on admission)  Assessment & Plan  Atorvastatin.     Lower limb amputation, great toe (HCC)- (present on admission)  Assessment & Plan  monitor    Class 2 severe obesity with serious comorbidity and body mass index (BMI) of 35.0 to 35.9 in adult (HCC)- (present on admission)  Assessment & Plan  Body mass index is 35.19 kg/m².       VTE prophylaxis: Lovenox

## 2021-05-30 NOTE — PROGRESS NOTES
Assumed care at 1845. Pt resting in bed. A&ox 4  Drowsy tonight. Easily arousable.  Tolerating clears. Poor appetite  O2 on 1LNC  No bm yet  abd dressing CDI. Binder in place  Up to chair x1 assist  Denies pain  Legal hold in place. Tele sitter.  Call light within reach. Hourly rounding in place

## 2021-05-30 NOTE — CARE PLAN
Problem: Urinary - Renal Perfusion  Goal: Ability to achieve and maintain adequate renal perfusion and functioning will improve  Outcome: Progressing     Problem: Fall Risk  Goal: Patient will remain free from falls  Outcome: Progressing   The patient is Stable - Low risk of patient condition declining or worsening    Shift Goals  Clinical Goals: increase activity tolerance  Patient Goals: Pain control    Progress made toward(s) clinical / shift goals: patient voiding adequately, fall precautions in place call light within reach bed alarm in place    Patient is not progressing towards the following goals:

## 2021-05-31 LAB
ANION GAP SERPL CALC-SCNC: 9 MMOL/L (ref 7–16)
BASOPHILS # BLD AUTO: 0.5 % (ref 0–1.8)
BASOPHILS # BLD: 0.06 K/UL (ref 0–0.12)
BUN SERPL-MCNC: 27 MG/DL (ref 8–22)
CALCIUM SERPL-MCNC: 8.8 MG/DL (ref 8.5–10.5)
CHLORIDE SERPL-SCNC: 105 MMOL/L (ref 96–112)
CO2 SERPL-SCNC: 24 MMOL/L (ref 20–33)
CREAT SERPL-MCNC: 0.83 MG/DL (ref 0.5–1.4)
DIGOXIN SERPL-MCNC: 0.4 NG/ML (ref 0.8–2)
EOSINOPHIL # BLD AUTO: 0.53 K/UL (ref 0–0.51)
EOSINOPHIL NFR BLD: 4.6 % (ref 0–6.9)
ERYTHROCYTE [DISTWIDTH] IN BLOOD BY AUTOMATED COUNT: 48.9 FL (ref 35.9–50)
GLUCOSE SERPL-MCNC: 103 MG/DL (ref 65–99)
HCT VFR BLD AUTO: 36.8 % (ref 42–52)
HGB BLD-MCNC: 11.7 G/DL (ref 14–18)
IMM GRANULOCYTES # BLD AUTO: 0.04 K/UL (ref 0–0.11)
IMM GRANULOCYTES NFR BLD AUTO: 0.4 % (ref 0–0.9)
LYMPHOCYTES # BLD AUTO: 1.91 K/UL (ref 1–4.8)
LYMPHOCYTES NFR BLD: 16.7 % (ref 22–41)
MAGNESIUM SERPL-MCNC: 1.6 MG/DL (ref 1.5–2.5)
MCH RBC QN AUTO: 28.5 PG (ref 27–33)
MCHC RBC AUTO-ENTMCNC: 31.8 G/DL (ref 33.7–35.3)
MCV RBC AUTO: 89.5 FL (ref 81.4–97.8)
MONOCYTES # BLD AUTO: 0.87 K/UL (ref 0–0.85)
MONOCYTES NFR BLD AUTO: 7.6 % (ref 0–13.4)
NEUTROPHILS # BLD AUTO: 8.01 K/UL (ref 1.82–7.42)
NEUTROPHILS NFR BLD: 70.2 % (ref 44–72)
NRBC # BLD AUTO: 0 K/UL
NRBC BLD-RTO: 0 /100 WBC
NT-PROBNP SERPL IA-MCNC: 4206 PG/ML (ref 0–125)
PLATELET # BLD AUTO: 208 K/UL (ref 164–446)
PMV BLD AUTO: 11.4 FL (ref 9–12.9)
POTASSIUM SERPL-SCNC: 3.3 MMOL/L (ref 3.6–5.5)
RBC # BLD AUTO: 4.11 M/UL (ref 4.7–6.1)
SODIUM SERPL-SCNC: 138 MMOL/L (ref 135–145)
WBC # BLD AUTO: 11.4 K/UL (ref 4.8–10.8)

## 2021-05-31 PROCEDURE — 700102 HCHG RX REV CODE 250 W/ 637 OVERRIDE(OP): Performed by: HOSPITALIST

## 2021-05-31 PROCEDURE — 700102 HCHG RX REV CODE 250 W/ 637 OVERRIDE(OP): Performed by: STUDENT IN AN ORGANIZED HEALTH CARE EDUCATION/TRAINING PROGRAM

## 2021-05-31 PROCEDURE — 700105 HCHG RX REV CODE 258: Performed by: FAMILY MEDICINE

## 2021-05-31 PROCEDURE — 83880 ASSAY OF NATRIURETIC PEPTIDE: CPT

## 2021-05-31 PROCEDURE — 770001 HCHG ROOM/CARE - MED/SURG/GYN PRIV*

## 2021-05-31 PROCEDURE — 700102 HCHG RX REV CODE 250 W/ 637 OVERRIDE(OP): Performed by: NURSE PRACTITIONER

## 2021-05-31 PROCEDURE — A9270 NON-COVERED ITEM OR SERVICE: HCPCS | Performed by: SURGERY

## 2021-05-31 PROCEDURE — A9270 NON-COVERED ITEM OR SERVICE: HCPCS | Performed by: STUDENT IN AN ORGANIZED HEALTH CARE EDUCATION/TRAINING PROGRAM

## 2021-05-31 PROCEDURE — A9270 NON-COVERED ITEM OR SERVICE: HCPCS | Performed by: HOSPITALIST

## 2021-05-31 PROCEDURE — 700102 HCHG RX REV CODE 250 W/ 637 OVERRIDE(OP): Performed by: SURGERY

## 2021-05-31 PROCEDURE — 700111 HCHG RX REV CODE 636 W/ 250 OVERRIDE (IP): Performed by: SURGERY

## 2021-05-31 PROCEDURE — 99233 SBSQ HOSP IP/OBS HIGH 50: CPT | Performed by: FAMILY MEDICINE

## 2021-05-31 PROCEDURE — 85025 COMPLETE CBC W/AUTO DIFF WBC: CPT

## 2021-05-31 PROCEDURE — 80162 ASSAY OF DIGOXIN TOTAL: CPT

## 2021-05-31 PROCEDURE — A9270 NON-COVERED ITEM OR SERVICE: HCPCS | Performed by: FAMILY MEDICINE

## 2021-05-31 PROCEDURE — A9270 NON-COVERED ITEM OR SERVICE: HCPCS | Performed by: NURSE PRACTITIONER

## 2021-05-31 PROCEDURE — 36415 COLL VENOUS BLD VENIPUNCTURE: CPT

## 2021-05-31 PROCEDURE — 83735 ASSAY OF MAGNESIUM: CPT

## 2021-05-31 PROCEDURE — 700102 HCHG RX REV CODE 250 W/ 637 OVERRIDE(OP): Performed by: FAMILY MEDICINE

## 2021-05-31 PROCEDURE — 80048 BASIC METABOLIC PNL TOTAL CA: CPT

## 2021-05-31 PROCEDURE — 700111 HCHG RX REV CODE 636 W/ 250 OVERRIDE (IP): Performed by: FAMILY MEDICINE

## 2021-05-31 RX ORDER — POTASSIUM CHLORIDE 20 MEQ/1
20 TABLET, EXTENDED RELEASE ORAL DAILY
Status: DISCONTINUED | OUTPATIENT
Start: 2021-05-31 | End: 2021-06-02

## 2021-05-31 RX ORDER — SODIUM HYPOCHLORITE 1.25 MG/ML
SOLUTION TOPICAL 2 TIMES DAILY
Status: DISCONTINUED | OUTPATIENT
Start: 2021-05-31 | End: 2021-06-01

## 2021-05-31 RX ORDER — MAGNESIUM SULFATE HEPTAHYDRATE 40 MG/ML
2 INJECTION, SOLUTION INTRAVENOUS ONCE
Status: COMPLETED | OUTPATIENT
Start: 2021-05-31 | End: 2021-05-31

## 2021-05-31 RX ADMIN — LEVOTHYROXINE SODIUM 125 MCG: 0.12 TABLET ORAL at 06:10

## 2021-05-31 RX ADMIN — METOPROLOL SUCCINATE 12.5 MG: 25 TABLET, EXTENDED RELEASE ORAL at 06:10

## 2021-05-31 RX ADMIN — ENOXAPARIN SODIUM 40 MG: 40 INJECTION SUBCUTANEOUS at 06:10

## 2021-05-31 RX ADMIN — POTASSIUM CHLORIDE 20 MEQ: 1500 TABLET, EXTENDED RELEASE ORAL at 08:55

## 2021-05-31 RX ADMIN — CLONAZEPAM 0.5 MG: 0.5 TABLET ORAL at 08:55

## 2021-05-31 RX ADMIN — PAROXETINE HYDROCHLORIDE 20 MG: 20 TABLET, FILM COATED ORAL at 08:55

## 2021-05-31 RX ADMIN — ZIPRASIDONE HYDROCHLORIDE 60 MG: 60 CAPSULE ORAL at 20:35

## 2021-05-31 RX ADMIN — SODIUM CHLORIDE: 9 INJECTION, SOLUTION INTRAVENOUS at 03:58

## 2021-05-31 RX ADMIN — FLUDROCORTISONE ACETATE 0.1 MG: 0.1 TABLET ORAL at 06:09

## 2021-05-31 RX ADMIN — DIGOXIN 125 MCG: 125 TABLET ORAL at 17:36

## 2021-05-31 RX ADMIN — DOCUSATE SODIUM 50 MG AND SENNOSIDES 8.6 MG 2 TABLET: 8.6; 5 TABLET, FILM COATED ORAL at 06:09

## 2021-05-31 RX ADMIN — OXYCODONE 5 MG: 5 TABLET ORAL at 08:55

## 2021-05-31 RX ADMIN — ATORVASTATIN CALCIUM 40 MG: 40 TABLET, FILM COATED ORAL at 17:36

## 2021-05-31 RX ADMIN — CLONAZEPAM 1 MG: 1 TABLET ORAL at 17:36

## 2021-05-31 RX ADMIN — MAGNESIUM SULFATE 2 G: 2 INJECTION INTRAVENOUS at 08:55

## 2021-05-31 RX ADMIN — HYDROCORTISONE 10 MG: 10 TABLET ORAL at 06:09

## 2021-05-31 RX ADMIN — ZIPRASIDONE HYDROCHLORIDE 60 MG: 60 CAPSULE ORAL at 08:55

## 2021-05-31 RX ADMIN — OXYCODONE 5 MG: 5 TABLET ORAL at 15:00

## 2021-05-31 RX ADMIN — HYDROCORTISONE 5 MG: 10 TABLET ORAL at 15:00

## 2021-05-31 ASSESSMENT — ENCOUNTER SYMPTOMS
FOCAL WEAKNESS: 0
FLANK PAIN: 0
NECK PAIN: 0
BLURRED VISION: 0
FEVER: 0
BACK PAIN: 0
DEPRESSION: 1
SPEECH CHANGE: 0
NERVOUS/ANXIOUS: 0
NAUSEA: 0
CHILLS: 0
WEAKNESS: 1
WHEEZING: 0
DIARRHEA: 0
HALLUCINATIONS: 0
DIZZINESS: 0
SORE THROAT: 0
ABDOMINAL PAIN: 1
MYALGIAS: 0
SHORTNESS OF BREATH: 0
VOMITING: 0
HEADACHES: 0
DIAPHORESIS: 0
PALPITATIONS: 0
SENSORY CHANGE: 0
COUGH: 0
HEARTBURN: 0

## 2021-05-31 ASSESSMENT — PAIN DESCRIPTION - PAIN TYPE
TYPE: ACUTE PAIN;SURGICAL PAIN
TYPE: ACUTE PAIN
TYPE: SURGICAL PAIN;ACUTE PAIN

## 2021-05-31 NOTE — PROGRESS NOTES
Progress Note    Author:  Laura Baeza MD    Date & Time:   5/31/2021   11:34 AM          Patient ID:             Name:             Sebastián Castro     YOB: 1965  Age:                 56 y.o.  male   MRN:               [id number]    ________________________________________________________________________  [facility]     Events:  ----------  Denies abdominal pain  +bms  No nausea/vomiting     Exam:       Vitals:    05/31/21 1122   BP: 139/94   Pulse: (!) 120   Resp: 17   Temp: 36.9 °C (98.4 °F)   SpO2: 91%     SpO2  Min: 91 %  Max: 100 %  O2 (LPM)  Min: 0  Max: 10  Temp  Min: 35.8 °C (96.4 °F)  Max: 36.9 °C (98.5 °F)    Intake/Output Summary (Last 24 hours) at 5/31/2021 1134  Last data filed at 5/31/2021 1122  Gross per 24 hour   Intake 1875 ml   Output 0 ml   Net 1875 ml     Output by Drain (mL) 05/29/21 0700 - 05/29/21 1859 05/29/21 1900 - 05/30/21 0659 05/30/21 0700 - 05/30/21 1859 05/30/21 1900 - 05/31/21 0659 05/31/21 0700 - 05/31/21 1134   Patient has no LDAs of requested type attached.       DIET ORDERS (From admission to next 24h)     Start     Ordered    05/28/21 1222  Diet Order Diet: Clear Liquid (high carb;); Tray Modifications (optional): No Sharps (Paperware)  ALL MEALS     Comments: Begin Low Fiber (GI Soft) diet as soon as alert on floor   Question Answer Comment   Diet: Clear Liquid high carb;   Tray Modifications (optional) No Sharps (Paperware)        05/28/21 1230                        Physical Exam  Nursing note reviewed.   NAD  Cardiovascular:      Rate and Rhythm: Normal rate and regular rhythm.   Pulmonary:      Effort: Pulmonary effort is normal.      Breath sounds: No wheezes or stridor  Abdominal:      General: Abdomen is soft,  non-tender,    Midline wound with slough at base, minimal drainage. Fascia intact             Recent Labs     05/29/21  0437 05/30/21  0250 05/31/21  0503   SODIUM 134* 137 138   POTASSIUM 5.0 4.1 3.3*   CHLORIDE 100 103  105   CO2 24 26 24   BUN 32* 37* 27*   CREATININE 2.28* 1.84* 0.83   MAGNESIUM 2.2 2.1 1.6   CALCIUM 9.2 8.6 8.8       Recent Labs     05/29/21  0437 05/30/21  0250 05/31/21  0503   GLUCOSE 137* 133* 103*       Recent Labs     05/29/21  0437 05/30/21  0250 05/31/21  0503   RBC 5.07 4.15* 4.11*   HEMOGLOBIN 14.7 11.8* 11.7*   HEMATOCRIT 47.3 38.0* 36.8*   PLATELETCT 195 205 208       Recent Labs     05/29/21 0437 05/30/21  0250 05/31/21  0503   WBC 16.3* 14.2* 11.4*   NEUTSPOLYS 71.80 69.80 70.20   LYMPHOCYTES 15.30* 16.60* 16.70*   MONOCYTES 7.90 7.90 7.60   EOSINOPHILS 4.00 4.70 4.60   BASOPHILS 0.40 0.40 0.50         ________________________________________________________________________      Patient Active Problem List   Diagnosis   • Gangrene of toe of right foot, s/p recent amputation (CMS-HCC)   • Hyponatremia   • Hypokalemia   • Class 2 severe obesity with serious comorbidity and body mass index (BMI) of 35.0 to 35.9 in adult ([facility])   • Hypomagnesemia   • Lower limb amputation, great toe ([facility])   • Mixed hyperlipidemia   • Diabetic infection of right foot ([facility])   • Venous stasis dermatitis of both lower extremities   • Psychogenic nonepileptic seizure   • Orthostatic hypotension   • Lumbar compression fracture ([facility])   • Fall from ground level   • Suicidal ideation   • Debility   • Adrenal insufficiency ([facility])   • SRUTHI (acute kidney injury) ([facility])   • Hypothyroidism   • Colostomy present ([facility])   • Syncope   • Major depressive disorder, recurrent, severe with psychotic features ([facility])   • Type 2 diabetes mellitus with hyperglycemia, with long-term current use of insulin ([facility])   • Chronic heart failure with preserved ejection fraction ([facility])   • Chronic venous insufficiency of lower extremity   • Permanent atrial fibrillation (HCC)   • Gram-negative bacteremia   • Obstructive uropathy   • Leukocytosis   • UTI (urinary tract infection)       Acute Issues/Plan:  Advance diet  Wound care consult; dakins packing ordered

## 2021-05-31 NOTE — PROGRESS NOTES
[facility] Medicine Daily Progress Note    Date of Service  5/31/2021    Chief Complaint  56 y.o. male admitted 2/9/2021 with Syncope.    Hospital Course  Admitted with syncope, he was noted to be bradycardic and hypotensive.  He has known history of permanent atrial fibrillation and he was on diltiazem, digoxin and metoprolol.  These medications were held.  Later on he was restarted on low-dose Toprol and Digoxin.  He was also started on anticoagulation with Eliquis.  He also had a possible history of adrenal insufficiency, he was already on Florinef and midodrine.  Midodrine was discontinued and he was started on cortisol. Further work-up showed that he had gram-negative bacteremia as well as urinary tract infection and acute kidney injury.  He was initially placed on IV Zosyn, later changed to oral Bactrim to finish the course.  He was carefully hydrated with IV fluids.  On admission he also expressed suicidal ideation.  Psychiatry was consulted the case, he was placed on a legal hold.  He was noted to have severe depression.  He has been placed on medication the form of Geodon, Paxil, Klonopin.  He has a colostomy in place, which the patient believes contributes to his severe depression because of ostomy care.  He had history of bowel perforation and splenic fluid collection requiring Segmental colectomy, colostomy creation, mobilization of splenic flexure, wound VAC placement, irrigation and debridement of flank wound on 11/10/2020.  Surgery was consulted on the case, and he underwent exploratory laparotomy and colostomy takedown and closure on 5/27/2021.    Interval Problem Update  Colostomy - pain mostly controlled  Suicidal - less thoughts  Afib - rate 100-120  Diabetes -   SRUTHI - crea trended down to 0.8  Low potassium and magnesium    Consultants/Specialty  Cardiology  Psychiatry  Surgery    Code Status  Full Code    Disposition  Legal Hold  SNF vs Inpatient psychiatry vs Group home    Review of  Systems  Review of Systems   Constitutional: Positive for malaise/fatigue. Negative for chills, diaphoresis and fever.   HENT: Negative for congestion, hearing loss and sore throat.    Eyes: Negative for blurred vision.   Respiratory: Negative for cough, shortness of breath and wheezing.    Cardiovascular: Negative for chest pain, palpitations and leg swelling.   Gastrointestinal: Positive for abdominal pain. Negative for diarrhea, heartburn, nausea and vomiting.   Genitourinary: Negative for dysuria, flank pain and hematuria.   Musculoskeletal: Negative for back pain, joint pain, myalgias and neck pain.   Skin: Negative for rash.   Neurological: Positive for weakness. Negative for dizziness, sensory change, speech change, focal weakness and headaches.   Psychiatric/Behavioral: Positive for depression and suicidal ideas. Negative for hallucinations. The patient is not nervous/anxious.       Physical Exam  Temp:  [36.3 °C (97.3 °F)-36.9 °C (98.4 °F)] 36.9 °C (98.4 °F)  Pulse:  [100-120] 120  Resp:  [16-18] 17  BP: (105-151)/(68-94) 139/94  SpO2:  [91 %-96 %] 91 %    Physical Exam  Vitals and nursing note reviewed.   Constitutional:       Appearance: He is obese.   HENT:      Head: Normocephalic and atraumatic.      Nose: No congestion.      Mouth/Throat:      Mouth: Mucous membranes are moist.   Eyes:      Extraocular Movements: Extraocular movements intact.      Conjunctiva/sclera: Conjunctivae normal.      Pupils: Pupils are equal, round, and reactive to light.   Cardiovascular:      Rate and Rhythm: Tachycardia present. Rhythm irregularly irregular.   Pulmonary:      Effort: Pulmonary effort is normal.      Breath sounds: Normal breath sounds.   Abdominal:      General: There is no distension.      Tenderness: There is abdominal tenderness. There is no guarding or rebound.      Comments: Dressing in place   Musculoskeletal:      Cervical back: No muscular tenderness.      Right lower leg: Edema present.      Left  lower leg: Edema present.   Skin:     General: Skin is warm and dry.   Neurological:      General: No focal deficit present.      Mental Status: He is alert and oriented to person, place, and time.      Cranial Nerves: No cranial nerve deficit.   Psychiatric:         Mood and Affect: Mood is not anxious.         Speech: Speech normal.         Thought Content: Thought content is not paranoid or delusional. Thought content includes suicidal ideation. Thought content does not include homicidal ideation. Thought content does not include homicidal or suicidal plan.         Judgment: Judgment is not impulsive.       Fluids    Intake/Output Summary (Last 24 hours) at 5/31/2021 1214  Last data filed at 5/31/2021 1122  Gross per 24 hour   Intake 1575 ml   Output 0 ml   Net 1575 ml       Laboratory  Recent Labs     05/29/21  0437 05/30/21  0250 05/31/21  0503   WBC 16.3* 14.2* 11.4*   RBC 5.07 4.15* 4.11*   HEMOGLOBIN 14.7 11.8* 11.7*   HEMATOCRIT 47.3 38.0* 36.8*   MCV 93.3 91.6 89.5   MCH 29.0 28.4 28.5   MCHC 31.1* 31.1* 31.8*   RDW 51.7* 49.9 48.9   PLATELETCT 195 205 208   MPV 11.0 11.0 11.4     Recent Labs     05/29/21  0437 05/30/21  0250 05/31/21  0503   SODIUM 134* 137 138   POTASSIUM 5.0 4.1 3.3*   CHLORIDE 100 103 105   CO2 24 26 24   GLUCOSE 137* 133* 103*   BUN 32* 37* 27*   CREATININE 2.28* 1.84* 0.83   CALCIUM 9.2 8.6 8.8                   Imaging  IR-MIDLINE CATHETER INSERTION WO GUIDANCE > AGE 3   Final Result                  Ultrasound-guided midline placement performed by qualified nursing staff    as above.          EC-ECHOCARDIOGRAM COMPLETE W/ CONT   Final Result      DX-CHEST-PORTABLE (1 VIEW)   Final Result      1.  Minor asymmetric interstitial opacity in the right lower lobe which could represent minor interstitial edema, pneumonia, or fibrotic change.      2.  No left lung consolidation.      3.  No evidence of congestive failure.      WM-[id number]-2 VIEW   Final Result         No specific finding to  suggest small bowel obstruction.      UQ-[id number]-4 VIEW   Final Result         No significant interval change.      DX-CHEST-LIMITED (1 VIEW)   Final Result         Diffuse interstitial prominence could relate to mild pulmonary edema or atypical infection      KE-EGDUDTU-3 VIEW   Final Result      Increased colonic gas without definite bowel obstruction.      US-RENAL   Final Result      No evidence of hydronephrosis.      Lobulated kidneys bilaterally.      CT-ABDOMEN-PELVIS WITH   Final Result      1.  There is no evidence of small bowel obstruction.   2.  There is a left lower quadrant ostomy.   3.  There is fluid distention of the colon distal to the surgical material in the left mid descending colon extending all the way to the rectum. There is no pneumatosis or free air.   4.  There is cholelithiasis without biliary dilatation.   5.  There has been interval removal of the drainage catheter anterior to the spleen with minimal hypodense area in the anterior spleen again noted. There is no new fluid collection.      IR-US GUIDED PIV   Final Result    Ultrasound-guided PERIPHERAL IV INSERTION performed by    qualified nursing staff as above.      EC-ECHOCARDIOGRAM COMPLETE W/O CONT   Final Result      DX-LUMBAR SPINE-2 OR 3 VIEWS   Final Result      Moderate compression deformity of T11 is new compared to 2018.      Mild wedge deformity of T12 is unchanged.      Degenerative changes including facet arthropathy.      Mild retrolisthesis of L5 on S1 and L3 on L4.              Assessment/Plan  * Syncope- (present on admission)  Assessment & Plan  secondary to Hypotension and Bradycardia    UTI (urinary tract infection)- (present on admission)  Assessment & Plan  Completed 10-day course of Bactrim    Gram-negative bacteremia- (present on admission)  Assessment & Plan  Completed 10-day course of Bactrim    Major depressive disorder, recurrent, severe with psychotic features (HCC)- (present on admission)  Assessment &  Plan  Geodon, Paxil, Klonopin    Colostomy present (HCC)- (present on admission)  Assessment & Plan  Exploratory laparotomy with colostomy takedown and closure 5/27/2021  Segmental colectomy, colostomy creation, mobilization of splenic flexure, wound VAC placement, irrigation and debridement of flank wound on 11/10/2020.  Pain control  Encourage I-S    Suicidal ideation- (present on admission)  Assessment & Plan  Legal Hold   Psychiatry following    Leukocytosis- (present on admission)  Assessment & Plan  Follow cbc    Obstructive uropathy- (present on admission)  Assessment & Plan  Bladder scan and straight cath prn    Permanent atrial fibrillation (HCC)- (present on admission)  Assessment & Plan  Toprol, resume Digoxin  Resume Eliquis when cleared by Surgery     Chronic heart failure with preserved ejection fraction (HCC)- (present on admission)  Assessment & Plan  Toprol  follow probnp    Type 2 diabetes mellitus with hyperglycemia, with long-term current use of insulin (HCC)- (present on admission)  Assessment & Plan  Follow bmp  Currently not requiring Insulin    Hypothyroidism- (present on admission)  Assessment & Plan  Increased Levothyroxine to 125 mcg  Check TSH on 6/29/2021    SRUTHI (acute kidney injury) (HCC)- (present on admission)  Assessment & Plan  Stop IVF  Follow bmp    Adrenal insufficiency (HCC)- (present on admission)  Assessment & Plan  Possible?  Cortef    Debility- (present on admission)  Assessment & Plan  PT and OT   vit d level pending    Orthostatic hypotension- (present on admission)  Assessment & Plan  Florinef    Hypomagnesemia- (present on admission)  Assessment & Plan  IV Mg 2 g  Follow level    Hypokalemia- (present on admission)  Assessment & Plan  Start Kdur  follow bmp    Hyponatremia- (present on admission)  Assessment & Plan  Stop IVF NS  Follow bmp    Chronic venous insufficiency of lower extremity- (present on admission)  Assessment & Plan  compression stockings.    Mixed  hyperlipidemia- (present on admission)  Assessment & Plan  Atorvastatin.     Lower limb amputation, great toe (HCC)- (present on admission)  Assessment & Plan  monitor    Class 2 severe obesity with serious comorbidity and body mass index (BMI) of 35.0 to 35.9 in adult (HCC)- (present on admission)  Assessment & Plan  Body mass index is 35.19 kg/m².       VTE prophylaxis: Lovenox

## 2021-05-31 NOTE — CARE PLAN
Problem: Knowledge Deficit - Standard  Goal: Patient and family/care givers will demonstrate understanding of plan of care, disease process/condition, diagnostic tests and medications  Outcome: Progressing     Problem: Provide Safe Environment  Goal: Suicide environmental safety, protocols, policies, and practices will be implemented  Outcome: Progressing   The patient is Stable - Low risk of patient condition declining or worsening    Shift Goals  Clinical Goals: pt will maintain O2 sat >88% on this shift  Patient Goals: Pain control    Progress made toward(s) clinical / shift goals:      Patient is not progressing towards the following goals:

## 2021-05-31 NOTE — CARE PLAN
The patient is Watcher - Medium risk of patient condition declining or worsening    Shift Goals  Clinical Goals: pt will maintain O2 sat >88% on this shift  Patient Goals: Pain control    Progress made toward(s) clinical / shift goals:  pt O2 sat has been >88% this shift.     Problem: Knowledge Deficit - Standard  Goal: Patient and family/care givers will demonstrate understanding of plan of care, disease process/condition, diagnostic tests and medications  Outcome: Progressing     Problem: Depression  Goal: Patient and family/caregiver will verbalize accurate information about at least two of the possible causes of depression, three-four of the signs and symptoms of depression  Outcome: Progressing     Problem: Respiratory  Goal: Patient will achieve/maintain optimum respiratory ventilation and gas exchange  Outcome: Progressing     Problem: Fall Risk  Goal: Patient will remain free from falls  Outcome: Progressing

## 2021-05-31 NOTE — PROGRESS NOTES
Bedside report received, pt resting in bed comfortably at this time.     Neuro - A/Ox4, gen weakness, assistx2-max  Resp - diminished breath sounds  Cardiac - gen edema  GI - No BM post op   - voiding per self  Skin - midline abd dressing in place CDI.     Pt on legal hold, tele sitter in place. POC discussed with pt, no other concerns at this time. Call light and side table within reach, hourly rounding in place.

## 2021-06-01 LAB
25(OH)D3 SERPL-MCNC: 26 NG/ML (ref 30–80)
ANION GAP SERPL CALC-SCNC: 12 MMOL/L (ref 7–16)
BASOPHILS # BLD AUTO: 0.5 % (ref 0–1.8)
BASOPHILS # BLD: 0.08 K/UL (ref 0–0.12)
BUN SERPL-MCNC: 21 MG/DL (ref 8–22)
CALCIUM SERPL-MCNC: 9.3 MG/DL (ref 8.5–10.5)
CHLORIDE SERPL-SCNC: 104 MMOL/L (ref 96–112)
CO2 SERPL-SCNC: 24 MMOL/L (ref 20–33)
CREAT SERPL-MCNC: 0.77 MG/DL (ref 0.5–1.4)
EOSINOPHIL # BLD AUTO: 0.5 K/UL (ref 0–0.51)
EOSINOPHIL NFR BLD: 3.3 % (ref 0–6.9)
ERYTHROCYTE [DISTWIDTH] IN BLOOD BY AUTOMATED COUNT: 48.5 FL (ref 35.9–50)
GLUCOSE SERPL-MCNC: 122 MG/DL (ref 65–99)
HCT VFR BLD AUTO: 44 % (ref 42–52)
HGB BLD-MCNC: 14.2 G/DL (ref 14–18)
IMM GRANULOCYTES # BLD AUTO: 0.08 K/UL (ref 0–0.11)
IMM GRANULOCYTES NFR BLD AUTO: 0.5 % (ref 0–0.9)
LYMPHOCYTES # BLD AUTO: 2.3 K/UL (ref 1–4.8)
LYMPHOCYTES NFR BLD: 15.1 % (ref 22–41)
MAGNESIUM SERPL-MCNC: 1.5 MG/DL (ref 1.5–2.5)
MCH RBC QN AUTO: 28.8 PG (ref 27–33)
MCHC RBC AUTO-ENTMCNC: 32.3 G/DL (ref 33.7–35.3)
MCV RBC AUTO: 89.2 FL (ref 81.4–97.8)
MONOCYTES # BLD AUTO: 1.19 K/UL (ref 0–0.85)
MONOCYTES NFR BLD AUTO: 7.8 % (ref 0–13.4)
NEUTROPHILS # BLD AUTO: 11.07 K/UL (ref 1.82–7.42)
NEUTROPHILS NFR BLD: 72.8 % (ref 44–72)
NRBC # BLD AUTO: 0 K/UL
NRBC BLD-RTO: 0 /100 WBC
PHOSPHATE SERPL-MCNC: 2.4 MG/DL (ref 2.5–4.5)
PLATELET # BLD AUTO: 277 K/UL (ref 164–446)
PMV BLD AUTO: 10.5 FL (ref 9–12.9)
POTASSIUM SERPL-SCNC: 3.4 MMOL/L (ref 3.6–5.5)
RBC # BLD AUTO: 4.93 M/UL (ref 4.7–6.1)
SODIUM SERPL-SCNC: 140 MMOL/L (ref 135–145)
WBC # BLD AUTO: 15.2 K/UL (ref 4.8–10.8)

## 2021-06-01 PROCEDURE — 99232 SBSQ HOSP IP/OBS MODERATE 35: CPT | Performed by: INTERNAL MEDICINE

## 2021-06-01 PROCEDURE — 700111 HCHG RX REV CODE 636 W/ 250 OVERRIDE (IP): Performed by: SURGERY

## 2021-06-01 PROCEDURE — A9270 NON-COVERED ITEM OR SERVICE: HCPCS | Performed by: NURSE PRACTITIONER

## 2021-06-01 PROCEDURE — 700102 HCHG RX REV CODE 250 W/ 637 OVERRIDE(OP): Performed by: STUDENT IN AN ORGANIZED HEALTH CARE EDUCATION/TRAINING PROGRAM

## 2021-06-01 PROCEDURE — 700102 HCHG RX REV CODE 250 W/ 637 OVERRIDE(OP): Performed by: INTERNAL MEDICINE

## 2021-06-01 PROCEDURE — A9270 NON-COVERED ITEM OR SERVICE: HCPCS | Performed by: INTERNAL MEDICINE

## 2021-06-01 PROCEDURE — A9270 NON-COVERED ITEM OR SERVICE: HCPCS | Performed by: HOSPITALIST

## 2021-06-01 PROCEDURE — 97535 SELF CARE MNGMENT TRAINING: CPT

## 2021-06-01 PROCEDURE — A9270 NON-COVERED ITEM OR SERVICE: HCPCS | Performed by: SURGERY

## 2021-06-01 PROCEDURE — 97530 THERAPEUTIC ACTIVITIES: CPT | Mod: CQ

## 2021-06-01 PROCEDURE — 36415 COLL VENOUS BLD VENIPUNCTURE: CPT

## 2021-06-01 PROCEDURE — 85025 COMPLETE CBC W/AUTO DIFF WBC: CPT

## 2021-06-01 PROCEDURE — 770001 HCHG ROOM/CARE - MED/SURG/GYN PRIV*

## 2021-06-01 PROCEDURE — A9270 NON-COVERED ITEM OR SERVICE: HCPCS | Performed by: STUDENT IN AN ORGANIZED HEALTH CARE EDUCATION/TRAINING PROGRAM

## 2021-06-01 PROCEDURE — 700101 HCHG RX REV CODE 250: Performed by: SURGERY

## 2021-06-01 PROCEDURE — 83735 ASSAY OF MAGNESIUM: CPT

## 2021-06-01 PROCEDURE — A9270 NON-COVERED ITEM OR SERVICE: HCPCS | Performed by: FAMILY MEDICINE

## 2021-06-01 PROCEDURE — 700101 HCHG RX REV CODE 250: Performed by: INTERNAL MEDICINE

## 2021-06-01 PROCEDURE — 700102 HCHG RX REV CODE 250 W/ 637 OVERRIDE(OP): Performed by: NURSE PRACTITIONER

## 2021-06-01 PROCEDURE — 84100 ASSAY OF PHOSPHORUS: CPT

## 2021-06-01 PROCEDURE — 80048 BASIC METABOLIC PNL TOTAL CA: CPT

## 2021-06-01 PROCEDURE — 700102 HCHG RX REV CODE 250 W/ 637 OVERRIDE(OP): Performed by: HOSPITALIST

## 2021-06-01 PROCEDURE — 700102 HCHG RX REV CODE 250 W/ 637 OVERRIDE(OP): Performed by: SURGERY

## 2021-06-01 PROCEDURE — 700111 HCHG RX REV CODE 636 W/ 250 OVERRIDE (IP): Performed by: STUDENT IN AN ORGANIZED HEALTH CARE EDUCATION/TRAINING PROGRAM

## 2021-06-01 PROCEDURE — 700102 HCHG RX REV CODE 250 W/ 637 OVERRIDE(OP): Performed by: FAMILY MEDICINE

## 2021-06-01 RX ORDER — CIPROFLOXACIN 500 MG/1
500 TABLET, FILM COATED ORAL EVERY 12 HOURS
Status: COMPLETED | OUTPATIENT
Start: 2021-06-01 | End: 2021-06-04

## 2021-06-01 RX ORDER — POTASSIUM CHLORIDE 20 MEQ/1
40 TABLET, EXTENDED RELEASE ORAL ONCE
Status: COMPLETED | OUTPATIENT
Start: 2021-06-01 | End: 2021-06-01

## 2021-06-01 RX ORDER — CIPROFLOXACIN 2 MG/ML
400 INJECTION, SOLUTION INTRAVENOUS EVERY 12 HOURS
Status: DISCONTINUED | OUTPATIENT
Start: 2021-06-01 | End: 2021-06-01

## 2021-06-01 RX ORDER — METRONIDAZOLE 500 MG/1
500 TABLET ORAL EVERY 8 HOURS
Status: DISCONTINUED | OUTPATIENT
Start: 2021-06-01 | End: 2021-06-05

## 2021-06-01 RX ORDER — SODIUM HYPOCHLORITE 1.25 MG/ML
SOLUTION TOPICAL 3 TIMES DAILY
Status: DISCONTINUED | OUTPATIENT
Start: 2021-06-01 | End: 2021-06-04

## 2021-06-01 RX ADMIN — CIPROFLOXACIN HYDROCHLORIDE 500 MG: 500 TABLET, FILM COATED ORAL at 18:45

## 2021-06-01 RX ADMIN — OXYCODONE 10 MG: 5 TABLET ORAL at 16:47

## 2021-06-01 RX ADMIN — HYDROCORTISONE 10 MG: 10 TABLET ORAL at 05:27

## 2021-06-01 RX ADMIN — METOPROLOL SUCCINATE 12.5 MG: 25 TABLET, EXTENDED RELEASE ORAL at 05:27

## 2021-06-01 RX ADMIN — DIGOXIN 125 MCG: 125 TABLET ORAL at 16:44

## 2021-06-01 RX ADMIN — HYDROCORTISONE 5 MG: 10 TABLET ORAL at 16:46

## 2021-06-01 RX ADMIN — DOCUSATE SODIUM 100 MG: 100 CAPSULE, LIQUID FILLED ORAL at 16:47

## 2021-06-01 RX ADMIN — METRONIDAZOLE 500 MG: 500 INJECTION, SOLUTION INTRAVENOUS at 16:49

## 2021-06-01 RX ADMIN — SODIUM HYPOCHLORITE 1 ML: 1.25 SOLUTION TOPICAL at 16:51

## 2021-06-01 RX ADMIN — CLONAZEPAM 0.5 MG: 0.5 TABLET ORAL at 12:39

## 2021-06-01 RX ADMIN — POTASSIUM CHLORIDE 40 MEQ: 1500 TABLET, EXTENDED RELEASE ORAL at 16:48

## 2021-06-01 RX ADMIN — PAROXETINE HYDROCHLORIDE 20 MG: 20 TABLET, FILM COATED ORAL at 12:39

## 2021-06-01 RX ADMIN — CLONAZEPAM 1 MG: 1 TABLET ORAL at 16:46

## 2021-06-01 RX ADMIN — ATORVASTATIN CALCIUM 40 MG: 40 TABLET, FILM COATED ORAL at 16:47

## 2021-06-01 RX ADMIN — FLUDROCORTISONE ACETATE 0.1 MG: 0.1 TABLET ORAL at 05:27

## 2021-06-01 RX ADMIN — METOCLOPRAMIDE 10 MG: 5 INJECTION, SOLUTION INTRAMUSCULAR; INTRAVENOUS at 07:51

## 2021-06-01 RX ADMIN — POTASSIUM CHLORIDE 20 MEQ: 1500 TABLET, EXTENDED RELEASE ORAL at 05:27

## 2021-06-01 RX ADMIN — ENOXAPARIN SODIUM 40 MG: 40 INJECTION SUBCUTANEOUS at 05:26

## 2021-06-01 RX ADMIN — ZIPRASIDONE HYDROCHLORIDE 60 MG: 60 CAPSULE ORAL at 21:04

## 2021-06-01 RX ADMIN — HYDROMORPHONE HYDROCHLORIDE 0.5 MG: 1 INJECTION, SOLUTION INTRAMUSCULAR; INTRAVENOUS; SUBCUTANEOUS at 09:20

## 2021-06-01 RX ADMIN — METRONIDAZOLE 500 MG: 500 TABLET ORAL at 21:04

## 2021-06-01 RX ADMIN — LEVOTHYROXINE SODIUM 125 MCG: 0.12 TABLET ORAL at 05:27

## 2021-06-01 RX ADMIN — CIPROFLOXACIN 400 MG: 2 INJECTION, SOLUTION INTRAVENOUS at 12:40

## 2021-06-01 RX ADMIN — ZIPRASIDONE HYDROCHLORIDE 60 MG: 60 CAPSULE ORAL at 12:39

## 2021-06-01 ASSESSMENT — COGNITIVE AND FUNCTIONAL STATUS - GENERAL
STANDING UP FROM CHAIR USING ARMS: A LITTLE
PERSONAL GROOMING: A LITTLE
TOILETING: A LITTLE
MOVING FROM LYING ON BACK TO SITTING ON SIDE OF FLAT BED: UNABLE
TURNING FROM BACK TO SIDE WHILE IN FLAT BAD: A LOT
MOBILITY SCORE: 11
HELP NEEDED FOR BATHING: A LOT
DRESSING REGULAR LOWER BODY CLOTHING: A LOT
DRESSING REGULAR UPPER BODY CLOTHING: A LOT
WALKING IN HOSPITAL ROOM: A LITTLE
DAILY ACTIVITIY SCORE: 16
CLIMB 3 TO 5 STEPS WITH RAILING: TOTAL
SUGGESTED CMS G CODE MODIFIER MOBILITY: CL
SUGGESTED CMS G CODE MODIFIER DAILY ACTIVITY: CK
MOVING TO AND FROM BED TO CHAIR: UNABLE

## 2021-06-01 ASSESSMENT — ENCOUNTER SYMPTOMS
CHILLS: 0
DEPRESSION: 1
DIAPHORESIS: 0
COUGH: 0
VOMITING: 0
NECK PAIN: 0
SORE THROAT: 0
BLURRED VISION: 0
PALPITATIONS: 0
SENSORY CHANGE: 0
SPEECH CHANGE: 0
HEARTBURN: 0
HALLUCINATIONS: 0
HEADACHES: 0
ABDOMINAL PAIN: 1
FEVER: 0
CONSTIPATION: 0
NAUSEA: 0
DIZZINESS: 0
FOCAL WEAKNESS: 0
WHEEZING: 0
FLANK PAIN: 0
BACK PAIN: 0
WEAKNESS: 1
MYALGIAS: 0
DIARRHEA: 0
NERVOUS/ANXIOUS: 0
SHORTNESS OF BREATH: 0

## 2021-06-01 ASSESSMENT — PAIN DESCRIPTION - PAIN TYPE
TYPE: ACUTE PAIN;SURGICAL PAIN
TYPE: ACUTE PAIN;SURGICAL PAIN
TYPE: SURGICAL PAIN;ACUTE PAIN

## 2021-06-01 ASSESSMENT — PAIN SCALES - GENERAL: PAIN_LEVEL: 1

## 2021-06-01 ASSESSMENT — FIBROSIS 4 INDEX: FIB4 SCORE: 1

## 2021-06-01 NOTE — PROGRESS NOTES
Bedside report received.  Assessment complete.  A&O x 4. Patient calls appropriately.  Patient ambulates with x1 assist and FWW. Bed alarm n/a.   Patient has 1/10 pain. Pt declines pain meds at this time.   Pt x2 emesis this am, green and watery, pt medicated per MAR. Tolerating clears diet.  Surgical MDI with dressing CDI, blanchable redness on sacrum.  + void, + flatus, + BM6/1.  Patient denies SOB.  SCD's refsed.  Review plan with of care with patient. Call light and personal belongings with in reach. Hourly rounding in place. All needs met at this time.

## 2021-06-01 NOTE — PROGRESS NOTES
Bedside report received, pt resting in bed comfortably at this time.     Neuro - A/Ox4, gen weakness, assistx2-max  Resp - diminished breath sounds  Cardiac - gen edema  GI - +BM, incontinent at times   - voiding per self, incontinent at times  Skin - midline abd dressing in place CDI, changed day shift 5/31 to dakins solution packing.      Pt on legal hold, tele sitter in place. POC discussed with pt, no other concerns at this time. Call light and side table within reach, hourly rounding in place.

## 2021-06-01 NOTE — CARE PLAN
Problem: Knowledge Deficit - Standard  Goal: Patient and family/care givers will demonstrate understanding of plan of care, disease process/condition, diagnostic tests and medications  Outcome: Progressing     Problem: Psychosocial  Goal: Patient's ability to identify and develop effective coping behaviors will improve  Outcome: Progressing   The patient is Watcher - Medium risk of patient condition declining or worsening    Shift Goals  Clinical Goals: fall prevention  Patient Goals: rest    Progress made toward(s) clinical / shift goals:  Pt educated on falls, verbalizes teaching. No falls this shift.     Patient is not progressing towards the following goals:

## 2021-06-01 NOTE — WOUND TEAM
[facility]  Inpatient Services  Wound and Skin Care Progress Note    Admission Date: 2/9/2021     Last order of IP CONSULT TO WOUND CARE was found on 3/18/2021 from Hospital Encounter on 2/9/2021     HPI, PMH, SH: Reviewed    Past Surgical History:   Procedure Laterality Date   • PB EXPLORATORY OF ABDOMEN  5/27/2021    Procedure: LAPAROTOMY, EXPLORATORY;  Surgeon: Kin Desouza D.O.;  Location: [facility];  Service: General   • COLOSTOMY TAKEDOWN  5/27/2021    Procedure: COLOSTOMY TAKEDOWN AND CLOSURE;  Surgeon: Kin Desouza D.O.;  Location: SURGERY [facility];  Service: General   • COLECTOMY N/A 11/10/2020    Procedure: COLECTOMY-SEGMENTAL, COLOSTOMY, MOBILIZATION OF SPLENIC FLEXURE, WOUND VAC PLACEMENT, IRRIGATION AND DEBRIDMENT FLANK WOUND;  Surgeon: Kin Desouza D.O.;  Location: [facility];  Service: General   • ZZZ CARDIAC CATH  07/21/2018    EF 45%, normal coronaries.   • IRRIGATION & DEBRIDEMENT ORTHO Right 2/19/2018    Procedure: IRRIGATION & DEBRIDEMENT ORTHO-FOOT;  Surgeon: Kirby Lopez M.D.;  Location: [facility];  Service: Orthopedics   • TOE AMPUTATION Right 1/17/2018    Procedure: TOE AMPUTATION-1ST RAY;  Surgeon: Doug Delong M.D.;  Location: [facility];  Service: Orthopedics   • TOE AMPUTATION Right 11/10/2017    Procedure: TOE AMPUTATION;  Surgeon: Osorio Leo M.D.;  Location: [facility];  Service: Orthopedics     Social History     Tobacco Use   • Smoking status: Never Smoker   • Smokeless tobacco: Never Used   Substance Use Topics   • Alcohol use: No     Chief Complaint   Patient presents with   • Syncope     X1 this morning when standing up to go to bathroom     Diagnosis: Syncope and collapse [R55]  Suicidal ideation [R45.851]  Sepsis due to Klebsiella pneumoniae (HCC) [A41.4]    Unit where seen by Wound Team: T418/00     WOUND CONSULT/FOLLOW UP RELATED TO: abdominal wound    WOUND HISTORY:  Pt is an older  gentleman well known to [facility] wound team 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 Pt currently admitted since 2/9/21 related to SI concerns due to body image issues related to colostomy.     *Pt underwent surgical reversal of colostomy with Dr. Desouza on 5/20/21. MLI was re-opened at that time and was then closed with interrupted staples and packing. Unfortunately MLI continued to Dehisce and therefore wound team was consulted to further manage MLI with dakins packing.     WOUND ASSESSMENT/LDA       Wound 05/27/21 Full Thickness Wound Abdomen (Active)   Wound Image     05/31/21 1700   Site Assessment Yellow;Red;Brown;Pale    Periwound Assessment Clean;Dry;Intact;Scar tissue    Margins Attached edges;Defined edges    Closure Adhesive bandage    Drainage Amount Small    Drainage Description Serosanguineous;Tan    Treatments Cleansed;Site care;Chemical Debridement    Wound Cleansing Approved Wound Cleanser    Periwound Protectant Skin Protectant Wipes to Periwound    Dressing Cleansing/Solutions 1/4 Strength Dakin's Solution    Dressing Options Plain Strip Packing;Moist Roll Gauze;Absorbent Abdominal Pad;Hypafix Tape    Dressing Changed Changed    Dressing Status Clean;Dry;Intact    Dressing Change/Treatment Frequency Every Shift, and As Needed    NEXT Dressing Change/Treatment Date 06/01/21    NEXT Weekly Photo (Inpatient Only) 06/07/21    Non-staged Wound Description Full thickness    Wound Length (cm) 6.5 cm    Wound Width (cm) 3.6 cm    Wound Depth (cm) 3.5 cm    Wound Surface Area (cm^2) 23.4 cm^2    Wound Volume (cm^3) 81.9 cm^3    Shape Irregular    Wound Odor None    Exposed Structures None    WOUND NURSE ONLY - Time Spent with Patient (mins) 60      Vascular:    SHAYAN:   No results found.    Lab Values:    Lab Results   Component Value Date/Time    WBC 11.4 (H) 05/31/2021 05:03 AM    RBC 4.11 (L) 05/31/2021 05:03 AM    HEMOGLOBIN 11.7 (L) 05/31/2021 05:03 AM    HEMATOCRIT 36.8 (L) 05/31/2021  "05:03 AM    CREACTPROT 7.52 (H) 11/29/2020 01:25 AM    SEDRATEWES 13 10/23/2018 08:02 AM    HBA1C 5.2 02/14/2021 01:30 AM      Culture Results show:  No results found for this or any previous visit (from the past 720 hour(s)).    Pain Level/Medicated:  Denies       INTERVENTIONS BY WOUND TEAM:  Chart and images reviewed. Discussed with bedside RN. All areas of concern (based on picture review, LDA review and discussion with bedside RN) have been thoroughly assessed. Documentation of areas based on significant findings. This RN in to assess patient. Performed standard wound care which includes appropriate positioning, dressing removal and non-selective debridement. Pictures and measurements obtained weekly if/when required.              ABD:  Preparation for Dressing removal: 1/4\" packing dressing to largest area of open wound removed without difficulty, no other packing in place to be removed.  Cleansed with:  wound cleanser and gauze  Sharp debridement: NA  Joya wound: Cleansed with wound cleanser and gauze  Primary Dressing: Dakins moistened roll gauze and 1\" plain strip packing packed into open areas of MLI between staples  Secondary (Outer) Dressing:  Secured with 1 folded ABD pad and hypafix tape    Interdisciplinary consultation: Patient, Bedside RN    EVALUATION / RATIONALE FOR TREATMENT:  Most Recent Date:  5/31/21: Wound initially with interrupted staples and packing now with dehiscence. Per Dr. Baeza request wound team initiated dakins packing. Pt may benefit from NPWT to assist with closure.     5/8/21: wound nearly healed. Does not require advanced wound care. Wound team signing off and nursing to re consult if site worsens. Continue with dressing care per order.  4/21/21: abd wound improving. Nursing to continue dsg changes per order.  4/15: Right posterior thigh DTI now resolved. Pink and blanching. Abdominal wound to surface level continuing with dressing POC.  4/7/21: Thigh wound nearly healed and " will likely be resolved at next assessment. Abdomen wound appears larger. Continued with Arti to move through inflammatory phase. Hydrofera blue for its bacteriostatic properties and to assist in drainage management.   03/31/21: thigh wound healing well now to a stage 2.  Continue current plan.  All interventions in place.   ABD more denuded and bloody.  Arti to assist in moving wound through inflammatory phase.   3/23/21: Right posterior thigh wound remains a DTI but appears to be evolving into a stage 2. Wound team was previously consulted regarding sacral wound which appeared to be a skin tear however skin now appears to be intact however discolored. Does not appear to be pressure related as pt is on a waffle, self mobile and sacral mepilex is in use. Area is also not a normal pressure injury shape. Wound team will continue to follow.   3/18/21: Pt with linear DTIs to R posterior thigh from lying on top of frye cath tubing. Offloading precautions ordered. Wound team to continue following.     Goals: Steady decrease in wound area and depth weekly.    WOUND TEAM PLAN OF CARE ([X] for frequency of wound follow up,):   Nursing to follow orders written for wound care. Contact wound team if area fails to progress, deteriorates or with any questions/concerns  Dressing changes by wound team:                   Follow up 3 times weekly:                NPWT change 3 times weekly:     Follow up 1-2 times weekly:     Follow up Bi-Monthly:                   Follow up as needed: x Nursing to manage.    Other (explain):     NURSING PLAN OF CARE ORDERS (X):  Dressing changes: See Dressing Care orders: X  Skin care: See Skin Care orders: x  RN Prevention Protocol: x  Rectal tube care: See Rectal Tube Care orders:   Other orders:        Anticipated discharge plans:   LTACH:        SNF/Rehab:                  Home Health Care:           Outpatient Wound Center:            Self/Family Care:        Other:   Group Home

## 2021-06-01 NOTE — DISCHARGE PLANNING
Agency/Facility Name: [facility]   Outcome: Left vmail for admissions regarding referral     Agency/Facility Name: [facility]  Spoke To: Jorgito  Outcome: Per Jorgito, referral was not received.  Requested DPA resend referral     Agency/Facility Name: [facility]  Spoke To: Amy  Outcome: Per Amy, referral was not received.  Requested DPA resend referral     Agency/Facility Name: [facility]   Spoke To: Admissions   Outcome: Referral is currently being reviewed

## 2021-06-01 NOTE — CARE PLAN
The patient is Stable - Low risk of patient condition declining or worsening    Shift Goals  Clinical Goals: pt will remain safe this shift, no falls  Patient Goals: Pain control    Progress made toward(s) clinical / shift goals:  pt has maintained safety precautions this shift, pt calls appropriately.     Problem: Knowledge Deficit - Standard  Goal: Patient and family/care givers will demonstrate understanding of plan of care, disease process/condition, diagnostic tests and medications  Outcome: Progressing     Problem: Depression  Goal: Patient and family/caregiver will verbalize accurate information about at least two of the possible causes of depression, three-four of the signs and symptoms of depression  Outcome: Progressing     Problem: Psychosocial  Goal: Patient's ability to identify and develop effective coping behaviors will improve  Outcome: Progressing     Problem: Respiratory  Goal: Patient will achieve/maintain optimum respiratory ventilation and gas exchange  Outcome: Progressing     Problem: Psychosocial  Goal: Patient's ability to re-evaluate and adapt role responsibilities will improve  Outcome: Progressing     Problem: Bowel Elimination  Goal: Establish and maintain regular bowel function  Outcome: Progressing

## 2021-06-01 NOTE — WOUND TEAM
[facility]  Inpatient Services  Wound and Skin Care Progress Note    Admission Date: 2/9/2021     Last order of IP CONSULT TO WOUND CARE was found on 3/18/2021 from Hospital Encounter on 2/9/2021     HPI, PMH, SH: Reviewed    Past Surgical History:   Procedure Laterality Date   • PB EXPLORATORY OF ABDOMEN  5/27/2021    Procedure: LAPAROTOMY, EXPLORATORY;  Surgeon: Kin Desouza D.O.;  Location: [facility];  Service: General   • COLOSTOMY TAKEDOWN  5/27/2021    Procedure: COLOSTOMY TAKEDOWN AND CLOSURE;  Surgeon: Kin Desouza D.O.;  Location: SURGERY [facility];  Service: General   • COLECTOMY N/A 11/10/2020    Procedure: COLECTOMY-SEGMENTAL, COLOSTOMY, MOBILIZATION OF SPLENIC FLEXURE, WOUND VAC PLACEMENT, IRRIGATION AND DEBRIDMENT FLANK WOUND;  Surgeon: Kin Desouza D.O.;  Location: [facility];  Service: General   • ZZZ CARDIAC CATH  07/21/2018    EF 45%, normal coronaries.   • IRRIGATION & DEBRIDEMENT ORTHO Right 2/19/2018    Procedure: IRRIGATION & DEBRIDEMENT ORTHO-FOOT;  Surgeon: Kirby Lopez M.D.;  Location: [facility];  Service: Orthopedics   • TOE AMPUTATION Right 1/17/2018    Procedure: TOE AMPUTATION-1ST RAY;  Surgeon: Doug Delong M.D.;  Location: [facility];  Service: Orthopedics   • TOE AMPUTATION Right 11/10/2017    Procedure: TOE AMPUTATION;  Surgeon: Osorio Leo M.D.;  Location: [facility];  Service: Orthopedics     Social History     Tobacco Use   • Smoking status: Never Smoker   • Smokeless tobacco: Never Used   Substance Use Topics   • Alcohol use: No     Chief Complaint   Patient presents with   • Syncope     X1 this morning when standing up to go to bathroom     Diagnosis: Syncope and collapse [R55]  Suicidal ideation [R45.851]  Sepsis due to Klebsiella pneumoniae (HCC) [A41.4]    Unit where seen by Wound Team: T418/00     WOUND CONSULT/FOLLOW UP RELATED TO: abdominal wound    WOUND HISTORY:  Pt is an older  gentleman well known to [facility] wound team 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 Pt currently admitted since 2/9/21 related to SI concerns due to body image issues related to colostomy.     *Pt underwent surgical reversal of colostomy with Dr. Desouza on 5/20/21. MLI was re-opened at that time and was then closed with interrupted staples and packing. Unfortunately MLI continued to Dehisce and therefore wound team was consulted to further manage MLI with dakins packing.     WOUND ASSESSMENT/LDA     Wound 05/27/21 Full Thickness Wound Abdomen (Active)   Wound Image         06/01/21 0900   Site Assessment Red;Drainage    Periwound Assessment Clean;Dry;Intact;Scar tissue    Margins Attached edges;Defined edges    Closure Adhesive bandage    Drainage Amount Small    Drainage Description Serosanguineous    Treatments Cleansed;Site care;Offloading;Chemical Debridement    Wound Cleansing Approved Wound Cleanser    Periwound Protectant Skin Protectant Wipes to Periwound    Dressing Cleansing/Solutions 1/4 Strength Dakin's Solution    Dressing Options Moist Roll Gauze;Absorbent Abdominal Pad;Hypafix Tape    Dressing Changed Changed    Dressing Status Clean;Dry;Intact    Dressing Change/Treatment Frequency Every Shift, and As Needed    NEXT Dressing Change/Treatment Date 06/01/21    NEXT Weekly Photo (Inpatient Only) 06/08/21    Non-staged Wound Description Full thickness    Wound Length (cm) 21 cm    Wound Width (cm) 4.5 cm    Wound Depth (cm) 4 cm    Wound Surface Area (cm^2) 94.5 cm^2    Wound Volume (cm^3) 378 cm^3    Wound Healing % -362    Shape Linear Irregular    Wound Odor None    Exposed Structures None    WOUND NURSE ONLY - Time Spent with Patient (mins) 60      Vascular:    SHAYAN:   No results found.    Lab Values:    Lab Results   Component Value Date/Time    WBC 15.2 (H) 06/01/2021 08:29 AM    RBC 4.93 06/01/2021 08:29 AM    HEMOGLOBIN 14.2 06/01/2021 08:29 AM    HEMATOCRIT 44.0 06/01/2021 08:29  AM    CREACTPROT 7.52 (H) 11/29/2020 01:25 AM    SEDRATEWES 13 10/23/2018 08:02 AM    HBA1C 5.2 02/14/2021 01:30 AM      Culture Results show:  No results found for this or any previous visit (from the past 720 hour(s)).    Pain Level/Medicated:  IV Morphine immediately prior to dressing change      INTERVENTIONS BY WOUND TEAM:  Chart and images reviewed. Discussed with bedside RN. All areas of concern (based on picture review, LDA review and discussion with bedside RN) have been thoroughly assessed. Documentation of areas based on significant findings. This RN in to assess patient. Performed standard wound care which includes appropriate positioning, dressing removal and non-selective debridement. Pictures and measurements obtained weekly if/when required.              ABD:  Preparation for Dressing removal:  packing dressing removed without difficulty, no other packing in place to be removed.  Cleansed with:  wound cleanser and gauze  Sharp debridement: NA, 9 staples removed from Incision line.   Joya wound: Cleansed with wound cleanser and gauze  Primary Dressing: Dakins moistened roll gauze packed into open MLI  Secondary (Outer) Dressing:  Secured with 1 folded ABD pad and hypafix tape    Interdisciplinary consultation: Patient, Bedside RN (Ashley), Wound RN (Delvis), & General Surgery (Dr. Desouza)    EVALUATION / RATIONALE FOR TREATMENT:  Most Recent Date:  6/1/21: Wound appears  than yesterdays pictures. Discussed with Dr. Desouza, plan to continue dakins TID for 48hrs, CT scan ordered today. Will re-evaluate on Thursday for potential vac placement.     5/31/21: Wound initially with interrupted staples and packing now with dehiscence. Per Dr. Baeza request wound team initiated dakins packing. Pt may benefit from NPWT to assist with closure.   5/8/21: wound nearly healed. Does not require advanced wound care. Wound team signing off and nursing to re consult if site worsens. Continue with dressing care  per order.  4/21/21: abd wound improving. Nursing to continue dsg changes per order.  4/15: Right posterior thigh DTI now resolved. Pink and blanching. Abdominal wound to surface level continuing with dressing POC.  4/7/21: Thigh wound nearly healed and will likely be resolved at next assessment. Abdomen wound appears larger. Continued with Arti to move through inflammatory phase. Hydrofera blue for its bacteriostatic properties and to assist in drainage management.   03/31/21: thigh wound healing well now to a stage 2.  Continue current plan.  All interventions in place.   ABD more denuded and bloody.  Arti to assist in moving wound through inflammatory phase.   3/23/21: Right posterior thigh wound remains a DTI but appears to be evolving into a stage 2. Wound team was previously consulted regarding sacral wound which appeared to be a skin tear however skin now appears to be intact however discolored. Does not appear to be pressure related as pt is on a waffle, self mobile and sacral mepilex is in use. Area is also not a normal pressure injury shape. Wound team will continue to follow.   3/18/21: Pt with linear DTIs to R posterior thigh from lying on top of frye cath tubing. Offloading precautions ordered. Wound team to continue following.     Goals: Steady decrease in wound area and depth weekly.    WOUND TEAM PLAN OF CARE ([X] for frequency of wound follow up,):   Nursing to follow orders written for wound care. Contact wound team if area fails to progress, deteriorates or with any questions/concerns  Dressing changes by wound team:                   Follow up 3 times weekly:                NPWT change 3 times weekly:     Follow up 1-2 times weekly:     Follow up Bi-Monthly:                   Follow up as needed: x Nursing to manage.    Other (explain):     NURSING PLAN OF CARE ORDERS (X):  Dressing changes: See Dressing Care orders: X  Skin care: See Skin Care orders: x  RN Prevention Protocol: x  Rectal  tube care: See Rectal Tube Care orders:   Other orders:        Anticipated discharge plans:   LTACH:        SNF/Rehab:                  Home Health Care:           Outpatient Wound Center:            Self/Family Care:        Other:   Group Home

## 2021-06-01 NOTE — THERAPY
Physical Therapy   Daily Treatment     Patient Name: Sebastián Castro  Age:  56 y.o., Sex:  male  Medical Record #: [id number]  Today's Date: 6/1/2021     Precautions: (P) Fall Risk    Assessment    Pt willing to participate w/PT. Pt now post op colostomy takedown w/dehisced wound. Pt requiring mod assist w/bed mobility, min<->mod assist to get to standing to his 4WW, could only manage 2-3 steps to the chair using the 4WW. Pt encouraged to start sitting EOB for his meals and OOB @ least 1x/day.     Plan    Continue current treatment plan.    DC Equipment Recommendations: Unable to determine at this time  Discharge Recommendations: Recommend post-acute placement for additional physical therapy services prior to discharge home     Objective       06/01/21 1541   Other Treatments   Other Treatments Provided EOB w/supervision to acclamate to sitting before sit<->stands.    Balance   Sitting Balance (Static) Fair   Sitting Balance (Dynamic) Fair   Standing Balance (Static) Poor +   Standing Balance (Dynamic) Poor   Weight Shift Sitting Poor   Weight Shift Standing Fair   Comments standing w/4WW   Gait Analysis   Comments Uses his 4WW for transfers only. Pt unable to amb any distances today 2* weakness.    Bed Mobility    Supine to Sit Moderate Assist  (HOB elevated, use of railing, to his Rt side)   Sit to Supine Moderate Assist  (HOB flat, no railing)   Scooting Moderate Assist  (seated)   Comments Pt needing cueing to log roll for abdominal integrity. Pt needing bilat LE facilitation.    Functional Mobility   Sit to Stand Moderate Assist  (from chair height, min from EOB)   Bed, Chair, Wheelchair Transfer Minimal Assist  (w/4WW)   Skilled Intervention Verbal Cuing;Postural Facilitation;Compensatory Strategies   Comments Pt transferred to/from the chair using his 4WW. More assist needed to get to standing from the chair height vs EOB.    How much difficulty does the patient currently have...   Turning over in bed (including  adjusting bedclothes, sheets and blankets)? 2   Sitting down on and standing up from a chair with arms (e.g., wheelchair, bedside commode, etc.) 1   Moving from lying on back to sitting on the side of the bed? 1   How much help from another person does the patient currently need...   Moving to and from a bed to a chair (including a wheelchair)? 3   Need to walk in a hospital room? 3   Climbing 3-5 steps with a railing? 1   6 clicks Mobility Score 11   Short Term Goals    Short Term Goal # 1 Pt will perform supine<->sit with supv within 6 visits in order to return home   Goal Outcome # 1 goal not met   Short Term Goal # 2 Pt will transfer bed<->chair with 4WW with supv within 6 visits in order to return home   Goal Outcome # 2 Goal not met   Short Term Goal # 3 Pt will ambulate 100' with supv within 6 visits in order to return home   Goal Outcome # 3 Goal not met   Short Term Goal # 4 Pt will ascend/descend 5 steps with supv within 6 visits in order to enter/exit home   Goal Outcome # 4 Goal not met

## 2021-06-01 NOTE — PROGRESS NOTES
[facility] Medicine Daily Progress Note    Date of Service  6/1/2021    Chief Complaint  56 y.o. male admitted 2/9/2021 with Syncope.    Hospital Course  Admitted with syncope, he was noted to be bradycardic and hypotensive.  He has known history of permanent atrial fibrillation and he was on diltiazem, digoxin and metoprolol.  These medications were held.  Later on he was restarted on low-dose Toprol and Digoxin.  He was also started on anticoagulation with Eliquis.  He also had a possible history of adrenal insufficiency, he was already on Florinef and midodrine.  Midodrine was discontinued and he was started on cortisol. Further work-up showed that he had gram-negative bacteremia as well as urinary tract infection and acute kidney injury.  He was initially placed on IV Zosyn, later changed to oral Bactrim to finish the course.  He was carefully hydrated with IV fluids.  On admission he also expressed suicidal ideation.  Psychiatry was consulted the case, he was placed on a legal hold.  He was noted to have severe depression.  He has been placed on medication the form of Geodon, Paxil, Klonopin.  He has a colostomy in place, which the patient believes contributes to his severe depression because of ostomy care.  He had history of bowel perforation and splenic fluid collection requiring Segmental colectomy, colostomy creation, mobilization of splenic flexure, wound VAC placement, irrigation and debridement of flank wound on 11/10/2020.  Surgery was consulted on the case, and he underwent exploratory laparotomy and colostomy takedown and closure on 5/27/2021.  6/1- restarted antibiotic per surgery team. WBC continued to increase.     Interval Problem Update  Colostomy - pain mostly controlled  Suicidal - less thoughts  Afib - rate 100-120  Diabetes -   SRUTHI - crea trended down to 0.8  Low potassium and magnesium    6/1- pt denies pain, or other concerns. WBC increasing. Antibiotic was started per surgery team who  is concern for infection. CT abdomen ?? Still pending legal hold eval     Consultants/Specialty  Cardiology  Psychiatry  Surgery    Code Status  Full Code    Disposition  Legal Hold  SNF vs Inpatient psychiatry vs Group home    Review of Systems  Review of Systems   Constitutional: Positive for malaise/fatigue. Negative for chills, diaphoresis and fever.   HENT: Negative for congestion, hearing loss and sore throat.    Eyes: Negative for blurred vision.   Respiratory: Negative for cough, shortness of breath and wheezing.    Cardiovascular: Negative for chest pain, palpitations and leg swelling.   Gastrointestinal: Positive for abdominal pain. Negative for constipation, diarrhea, heartburn, nausea and vomiting.   Genitourinary: Negative for dysuria, flank pain and hematuria.   Musculoskeletal: Negative for back pain, joint pain, myalgias and neck pain.   Skin: Negative for rash.   Neurological: Positive for weakness. Negative for dizziness, sensory change, speech change, focal weakness and headaches.   Psychiatric/Behavioral: Positive for depression and suicidal ideas. Negative for hallucinations. The patient is not nervous/anxious.       Physical Exam  Temp:  [36.6 °C (97.8 °F)-37.1 °C (98.8 °F)] 36.7 °C (98.1 °F)  Pulse:  [] 121  Resp:  [16-18] 18  BP: (123-153)/(77-97) 130/97  SpO2:  [91 %-94 %] 93 %    Physical Exam  Vitals and nursing note reviewed.   Constitutional:       Appearance: He is obese.   HENT:      Head: Normocephalic and atraumatic.      Nose: No congestion.      Mouth/Throat:      Mouth: Mucous membranes are moist.   Eyes:      Extraocular Movements: Extraocular movements intact.      Conjunctiva/sclera: Conjunctivae normal.      Pupils: Pupils are equal, round, and reactive to light.   Cardiovascular:      Rate and Rhythm: Tachycardia present. Rhythm irregularly irregular.   Pulmonary:      Effort: Pulmonary effort is normal.      Breath sounds: Normal breath sounds.   Abdominal:       General: There is no distension.      Tenderness: There is abdominal tenderness. There is no guarding or rebound.      Comments: Dressing in place   Musculoskeletal:      Cervical back: No muscular tenderness.      Right lower leg: Edema present.      Left lower leg: Edema present.   Skin:     General: Skin is warm and dry.   Neurological:      General: No focal deficit present.      Mental Status: He is alert and oriented to person, place, and time.      Cranial Nerves: No cranial nerve deficit.   Psychiatric:         Mood and Affect: Mood is not anxious.         Speech: Speech normal.         Thought Content: Thought content is not paranoid or delusional. Thought content includes suicidal ideation. Thought content does not include homicidal ideation. Thought content does not include homicidal or suicidal plan.         Judgment: Judgment is not impulsive.       Fluids    Intake/Output Summary (Last 24 hours) at 6/1/2021 1504  Last data filed at 6/1/2021 1135  Gross per 24 hour   Intake 300 ml   Output 1850 ml   Net -1550 ml       Laboratory  Recent Labs     05/30/21  0250 05/31/21  0503 06/01/21  0829   WBC 14.2* 11.4* 15.2*   RBC 4.15* 4.11* 4.93   HEMOGLOBIN 11.8* 11.7* 14.2   HEMATOCRIT 38.0* 36.8* 44.0   MCV 91.6 89.5 89.2   MCH 28.4 28.5 28.8   MCHC 31.1* 31.8* 32.3*   RDW 49.9 48.9 48.5   PLATELETCT 205 208 277   MPV 11.0 11.4 10.5     Recent Labs     05/30/21  0250 05/31/21  0503 06/01/21  0829   SODIUM 137 138 140   POTASSIUM 4.1 3.3* 3.4*   CHLORIDE 103 105 104   CO2 26 24 24   GLUCOSE 133* 103* 122*   BUN 37* 27* 21   CREATININE 1.84* 0.83 0.77   CALCIUM 8.6 8.8 9.3                   Imaging  IR-MIDLINE CATHETER INSERTION WO GUIDANCE > AGE 3   Final Result                  Ultrasound-guided midline placement performed by qualified nursing staff    as above.          EC-ECHOCARDIOGRAM COMPLETE W/ CONT   Final Result      DX-CHEST-PORTABLE (1 VIEW)   Final Result      1.  Minor asymmetric interstitial  opacity in the right lower lobe which could represent minor interstitial edema, pneumonia, or fibrotic change.      2.  No left lung consolidation.      3.  No evidence of congestive failure.      AL-MZJOZCL-3 VIEW   Final Result         No specific finding to suggest small bowel obstruction.      HP-[id number]-0 VIEW   Final Result         No significant interval change.      DX-CHEST-LIMITED (1 VIEW)   Final Result         Diffuse interstitial prominence could relate to mild pulmonary edema or atypical infection      [id number] VIEW   Final Result      Increased colonic gas without definite bowel obstruction.      US-RENAL   Final Result      No evidence of hydronephrosis.      Lobulated kidneys bilaterally.      CT-ABDOMEN-PELVIS WITH   Final Result      1.  There is no evidence of small bowel obstruction.   2.  There is a left lower quadrant ostomy.   3.  There is fluid distention of the colon distal to the surgical material in the left mid descending colon extending all the way to the rectum. There is no pneumatosis or free air.   4.  There is cholelithiasis without biliary dilatation.   5.  There has been interval removal of the drainage catheter anterior to the spleen with minimal hypodense area in the anterior spleen again noted. There is no new fluid collection.      IR-US GUIDED PIV   Final Result    Ultrasound-guided PERIPHERAL IV INSERTION performed by    qualified nursing staff as above.      EC-ECHOCARDIOGRAM COMPLETE W/O CONT   Final Result      DX-LUMBAR SPINE-2 OR 3 VIEWS   Final Result      Moderate compression deformity of T11 is new compared to 2018.      Mild wedge deformity of T12 is unchanged.      Degenerative changes including facet arthropathy.      Mild retrolisthesis of L5 on S1 and L3 on L4.              Assessment/Plan  * Syncope- (present on admission)  Assessment & Plan  secondary to Hypotension and Bradycardia    UTI (urinary tract infection)- (present on admission)  Assessment &  Plan  Completed 10-day course of Bactrim    Leukocytosis- (present on admission)  Assessment & Plan  Follow cbc    Obstructive uropathy- (present on admission)  Assessment & Plan  Bladder scan and straight cath prn    Gram-negative bacteremia- (present on admission)  Assessment & Plan  Completed 10-day course of Bactrim    Permanent atrial fibrillation (HCC)- (present on admission)  Assessment & Plan  Toprol, resume Digoxin  Resume Eliquis when cleared by Surgery     Chronic venous insufficiency of lower extremity- (present on admission)  Assessment & Plan  compression stockings.    Chronic heart failure with preserved ejection fraction (HCC)- (present on admission)  Assessment & Plan  Toprol  follow probnp    Type 2 diabetes mellitus with hyperglycemia, with long-term current use of insulin (HCC)- (present on admission)  Assessment & Plan  Follow bmp  Currently not requiring Insulin    Major depressive disorder, recurrent, severe with psychotic features (HCC)- (present on admission)  Assessment & Plan  Geodon, Paxil, Klonopin    Colostomy present (HCC)- (present on admission)  Assessment & Plan  Exploratory laparotomy with colostomy takedown and closure 5/27/2021  Segmental colectomy, colostomy creation, mobilization of splenic flexure, wound VAC placement, irrigation and debridement of flank wound on 11/10/2020.  Pain control  Encourage I-S  6/1- abdominal wound pictures reviewed. Review recs from surgery. Started on antibiotic. Continue to monitor     Hypothyroidism- (present on admission)  Assessment & Plan  Increased Levothyroxine to 125 mcg  Check TSH on 6/29/2021    SRUTHI (acute kidney injury) (HCC)- (present on admission)  Assessment & Plan  6/1- resolved     Adrenal insufficiency (HCC)- (present on admission)  Assessment & Plan  Possible?  Cortef    Debility- (present on admission)  Assessment & Plan  PT and OT   vit d level pending    Suicidal ideation- (present on admission)  Assessment & Plan  Legal Hold    Psychiatry following    Orthostatic hypotension- (present on admission)  Assessment & Plan  Florinef    Mixed hyperlipidemia- (present on admission)  Assessment & Plan  Atorvastatin.     Lower limb amputation, great toe (HCC)- (present on admission)  Assessment & Plan  monitor    Hypomagnesemia- (present on admission)  Assessment & Plan  IV Mg 2 g  Follow level    Class 2 severe obesity with serious comorbidity and body mass index (BMI) of 35.0 to 35.9 in adult (HCC)- (present on admission)  Assessment & Plan  Body mass index is 35.19 kg/m².    Hypokalemia- (present on admission)  Assessment & Plan  Start Kdur  follow bmp  6/1- replace. Continue to monitor     Hyponatremia- (present on admission)  Assessment & Plan  6/1- resolved        VTE prophylaxis: Lovenox

## 2021-06-01 NOTE — PROGRESS NOTES
Surgery    Postop day 5 after colostomy takedown  Wound breakdown: Packed by nursing last night  Taking some clears  Producing gas and stool  Mobilizing  White count up    /90   Pulse (!) 125 Comment: RN notified  Temp 36.6 °C (97.8 °F) (Temporal)   Resp 17   Ht 1.829 m (6')   Wt 113 kg (249 lb 1.9 oz)   SpO2 94%   BMI 33.79 kg/m²     No distress  Lying in bed  Abdomen soft nontender  Ostomy site closed and healing  Midline wound with breakdown above the umbilicus with significant subcutaneous fibrinous exudate.  Fascia intact    Recent Labs     05/30/21  0250 05/31/21  0503 06/01/21  0829   WBC 14.2* 11.4* 15.2*   RBC 4.15* 4.11* 4.93   HEMOGLOBIN 11.8* 11.7* 14.2   HEMATOCRIT 38.0* 36.8* 44.0   MCV 91.6 89.5 89.2   MCH 28.4 28.5 28.8   RDW 49.9 48.9 48.5   PLATELETCT 205 208 277   MPV 11.0 11.4 10.5   NEUTSPOLYS 69.80 70.20 72.80*   LYMPHOCYTES 16.60* 16.70* 15.10*   MONOCYTES 7.90 7.60 7.80   EOSINOPHILS 4.70 4.60 3.30   BASOPHILS 0.40 0.50 0.50     Assessment and plan  56-year-old male status post colostomy takedown with appropriate return of bowel function but significant wound issues.  Patient seen with wound care nurses.  Changed to Dakin's dressings 3 times daily.  CT abdomen to ensure that there is not an underlying intra-abdominal process  Start antibiotics  Hopefully will be able to transition to a wound VAC later in the week.    Medical management per hospitalist service

## 2021-06-02 LAB
ALBUMIN SERPL BCP-MCNC: 2.5 G/DL (ref 3.2–4.9)
ALBUMIN/GLOB SERPL: 0.8 G/DL
ALP SERPL-CCNC: 108 U/L (ref 30–99)
ALT SERPL-CCNC: 16 U/L (ref 2–50)
ANION GAP SERPL CALC-SCNC: 9 MMOL/L (ref 7–16)
AST SERPL-CCNC: 18 U/L (ref 12–45)
BASOPHILS # BLD AUTO: 0.4 % (ref 0–1.8)
BASOPHILS # BLD: 0.05 K/UL (ref 0–0.12)
BILIRUB SERPL-MCNC: 0.7 MG/DL (ref 0.1–1.5)
BUN SERPL-MCNC: 20 MG/DL (ref 8–22)
CALCIUM SERPL-MCNC: 8.6 MG/DL (ref 8.5–10.5)
CHLORIDE SERPL-SCNC: 103 MMOL/L (ref 96–112)
CO2 SERPL-SCNC: 26 MMOL/L (ref 20–33)
CREAT SERPL-MCNC: 0.82 MG/DL (ref 0.5–1.4)
EOSINOPHIL # BLD AUTO: 0.46 K/UL (ref 0–0.51)
EOSINOPHIL NFR BLD: 4 % (ref 0–6.9)
ERYTHROCYTE [DISTWIDTH] IN BLOOD BY AUTOMATED COUNT: 47.6 FL (ref 35.9–50)
GLOBULIN SER CALC-MCNC: 3.3 G/DL (ref 1.9–3.5)
GLUCOSE SERPL-MCNC: 94 MG/DL (ref 65–99)
HCT VFR BLD AUTO: 37.4 % (ref 42–52)
HGB BLD-MCNC: 12.5 G/DL (ref 14–18)
IMM GRANULOCYTES # BLD AUTO: 0.09 K/UL (ref 0–0.11)
IMM GRANULOCYTES NFR BLD AUTO: 0.8 % (ref 0–0.9)
LYMPHOCYTES # BLD AUTO: 2.88 K/UL (ref 1–4.8)
LYMPHOCYTES NFR BLD: 24.7 % (ref 22–41)
MCH RBC QN AUTO: 29 PG (ref 27–33)
MCHC RBC AUTO-ENTMCNC: 33.4 G/DL (ref 33.7–35.3)
MCV RBC AUTO: 86.8 FL (ref 81.4–97.8)
MONOCYTES # BLD AUTO: 1 K/UL (ref 0–0.85)
MONOCYTES NFR BLD AUTO: 8.6 % (ref 0–13.4)
NEUTROPHILS # BLD AUTO: 7.16 K/UL (ref 1.82–7.42)
NEUTROPHILS NFR BLD: 61.5 % (ref 44–72)
NRBC # BLD AUTO: 0 K/UL
NRBC BLD-RTO: 0 /100 WBC
PLATELET # BLD AUTO: 231 K/UL (ref 164–446)
PMV BLD AUTO: 10.2 FL (ref 9–12.9)
POTASSIUM SERPL-SCNC: 3.2 MMOL/L (ref 3.6–5.5)
PROT SERPL-MCNC: 5.8 G/DL (ref 6–8.2)
RBC # BLD AUTO: 4.31 M/UL (ref 4.7–6.1)
SODIUM SERPL-SCNC: 138 MMOL/L (ref 135–145)
WBC # BLD AUTO: 11.6 K/UL (ref 4.8–10.8)

## 2021-06-02 PROCEDURE — A9270 NON-COVERED ITEM OR SERVICE: HCPCS | Performed by: SURGERY

## 2021-06-02 PROCEDURE — 700102 HCHG RX REV CODE 250 W/ 637 OVERRIDE(OP): Performed by: INTERNAL MEDICINE

## 2021-06-02 PROCEDURE — 700111 HCHG RX REV CODE 636 W/ 250 OVERRIDE (IP): Performed by: SURGERY

## 2021-06-02 PROCEDURE — 85025 COMPLETE CBC W/AUTO DIFF WBC: CPT

## 2021-06-02 PROCEDURE — 700102 HCHG RX REV CODE 250 W/ 637 OVERRIDE(OP): Performed by: STUDENT IN AN ORGANIZED HEALTH CARE EDUCATION/TRAINING PROGRAM

## 2021-06-02 PROCEDURE — A9270 NON-COVERED ITEM OR SERVICE: HCPCS | Performed by: STUDENT IN AN ORGANIZED HEALTH CARE EDUCATION/TRAINING PROGRAM

## 2021-06-02 PROCEDURE — 36415 COLL VENOUS BLD VENIPUNCTURE: CPT

## 2021-06-02 PROCEDURE — A9270 NON-COVERED ITEM OR SERVICE: HCPCS | Performed by: FAMILY MEDICINE

## 2021-06-02 PROCEDURE — A9270 NON-COVERED ITEM OR SERVICE: HCPCS | Performed by: NURSE PRACTITIONER

## 2021-06-02 PROCEDURE — A9270 NON-COVERED ITEM OR SERVICE: HCPCS | Performed by: HOSPITALIST

## 2021-06-02 PROCEDURE — 700102 HCHG RX REV CODE 250 W/ 637 OVERRIDE(OP): Performed by: FAMILY MEDICINE

## 2021-06-02 PROCEDURE — 700102 HCHG RX REV CODE 250 W/ 637 OVERRIDE(OP): Performed by: SURGERY

## 2021-06-02 PROCEDURE — A9270 NON-COVERED ITEM OR SERVICE: HCPCS | Performed by: INTERNAL MEDICINE

## 2021-06-02 PROCEDURE — 700102 HCHG RX REV CODE 250 W/ 637 OVERRIDE(OP): Performed by: NURSE PRACTITIONER

## 2021-06-02 PROCEDURE — 99232 SBSQ HOSP IP/OBS MODERATE 35: CPT | Performed by: INTERNAL MEDICINE

## 2021-06-02 PROCEDURE — 700102 HCHG RX REV CODE 250 W/ 637 OVERRIDE(OP): Performed by: HOSPITALIST

## 2021-06-02 PROCEDURE — 80053 COMPREHEN METABOLIC PANEL: CPT

## 2021-06-02 PROCEDURE — 770001 HCHG ROOM/CARE - MED/SURG/GYN PRIV*

## 2021-06-02 RX ORDER — POTASSIUM CHLORIDE 20 MEQ/1
40 TABLET, EXTENDED RELEASE ORAL DAILY
Status: DISCONTINUED | OUTPATIENT
Start: 2021-06-02 | End: 2021-06-03

## 2021-06-02 RX ADMIN — ENOXAPARIN SODIUM 40 MG: 40 INJECTION SUBCUTANEOUS at 04:40

## 2021-06-02 RX ADMIN — ZIPRASIDONE HYDROCHLORIDE 60 MG: 60 CAPSULE ORAL at 21:08

## 2021-06-02 RX ADMIN — FLUDROCORTISONE ACETATE 0.1 MG: 0.1 TABLET ORAL at 04:40

## 2021-06-02 RX ADMIN — METRONIDAZOLE 500 MG: 500 TABLET ORAL at 13:15

## 2021-06-02 RX ADMIN — ZIPRASIDONE HYDROCHLORIDE 60 MG: 60 CAPSULE ORAL at 09:10

## 2021-06-02 RX ADMIN — SODIUM HYPOCHLORITE 1 ML: 1.25 SOLUTION TOPICAL at 06:00

## 2021-06-02 RX ADMIN — DIBASIC SODIUM PHOSPHATE, MONOBASIC POTASSIUM PHOSPHATE AND MONOBASIC SODIUM PHOSPHATE 250 MG: 852; 155; 130 TABLET ORAL at 09:11

## 2021-06-02 RX ADMIN — ATORVASTATIN CALCIUM 40 MG: 40 TABLET, FILM COATED ORAL at 17:39

## 2021-06-02 RX ADMIN — SODIUM HYPOCHLORITE 1 ML: 1.25 SOLUTION TOPICAL at 13:15

## 2021-06-02 RX ADMIN — DIBASIC SODIUM PHOSPHATE, MONOBASIC POTASSIUM PHOSPHATE AND MONOBASIC SODIUM PHOSPHATE 250 MG: 852; 155; 130 TABLET ORAL at 17:39

## 2021-06-02 RX ADMIN — DIBASIC SODIUM PHOSPHATE, MONOBASIC POTASSIUM PHOSPHATE AND MONOBASIC SODIUM PHOSPHATE 250 MG: 852; 155; 130 TABLET ORAL at 13:15

## 2021-06-02 RX ADMIN — OXYCODONE 10 MG: 5 TABLET ORAL at 21:08

## 2021-06-02 RX ADMIN — OXYCODONE 5 MG: 5 TABLET ORAL at 13:15

## 2021-06-02 RX ADMIN — METRONIDAZOLE 500 MG: 500 TABLET ORAL at 04:40

## 2021-06-02 RX ADMIN — CLONAZEPAM 0.5 MG: 0.5 TABLET ORAL at 09:10

## 2021-06-02 RX ADMIN — METOPROLOL SUCCINATE 12.5 MG: 25 TABLET, EXTENDED RELEASE ORAL at 04:39

## 2021-06-02 RX ADMIN — CLONAZEPAM 1 MG: 1 TABLET ORAL at 17:39

## 2021-06-02 RX ADMIN — HYDROCORTISONE 5 MG: 10 TABLET ORAL at 17:39

## 2021-06-02 RX ADMIN — POTASSIUM CHLORIDE 40 MEQ: 1500 TABLET, EXTENDED RELEASE ORAL at 09:11

## 2021-06-02 RX ADMIN — LEVOTHYROXINE SODIUM 125 MCG: 0.12 TABLET ORAL at 04:39

## 2021-06-02 RX ADMIN — POTASSIUM CHLORIDE 20 MEQ: 1500 TABLET, EXTENDED RELEASE ORAL at 04:39

## 2021-06-02 RX ADMIN — PAROXETINE HYDROCHLORIDE 20 MG: 20 TABLET, FILM COATED ORAL at 09:10

## 2021-06-02 RX ADMIN — DIBASIC SODIUM PHOSPHATE, MONOBASIC POTASSIUM PHOSPHATE AND MONOBASIC SODIUM PHOSPHATE 250 MG: 852; 155; 130 TABLET ORAL at 23:01

## 2021-06-02 RX ADMIN — DOCUSATE SODIUM 100 MG: 100 CAPSULE, LIQUID FILLED ORAL at 17:38

## 2021-06-02 RX ADMIN — METRONIDAZOLE 500 MG: 500 TABLET ORAL at 21:08

## 2021-06-02 RX ADMIN — CIPROFLOXACIN HYDROCHLORIDE 500 MG: 500 TABLET, FILM COATED ORAL at 04:40

## 2021-06-02 RX ADMIN — SODIUM HYPOCHLORITE 1 ML: 1.25 SOLUTION TOPICAL at 21:36

## 2021-06-02 RX ADMIN — DIGOXIN 125 MCG: 125 TABLET ORAL at 17:39

## 2021-06-02 RX ADMIN — CIPROFLOXACIN HYDROCHLORIDE 500 MG: 500 TABLET, FILM COATED ORAL at 17:39

## 2021-06-02 RX ADMIN — HYDROCORTISONE 10 MG: 10 TABLET ORAL at 04:39

## 2021-06-02 ASSESSMENT — PAIN DESCRIPTION - PAIN TYPE
TYPE: ACUTE PAIN;SURGICAL PAIN
TYPE: ACUTE PAIN

## 2021-06-02 ASSESSMENT — ENCOUNTER SYMPTOMS
SENSORY CHANGE: 0
CONSTIPATION: 0
CHILLS: 0
WHEEZING: 0
FOCAL WEAKNESS: 0
DIAPHORESIS: 0
MYALGIAS: 0
ABDOMINAL PAIN: 1
HEADACHES: 0
BACK PAIN: 0
NERVOUS/ANXIOUS: 0
SHORTNESS OF BREATH: 0
HEARTBURN: 0
DEPRESSION: 1
VOMITING: 0
SORE THROAT: 0
NECK PAIN: 0
FLANK PAIN: 0
NAUSEA: 0
FEVER: 0
DIZZINESS: 0
DIARRHEA: 0
WEAKNESS: 1
BLURRED VISION: 0
SPEECH CHANGE: 0
PALPITATIONS: 0
COUGH: 0
HALLUCINATIONS: 0

## 2021-06-02 NOTE — DISCHARGE PLANNING
"Agency/Facility Name: [facility]  Outcome: Per faxed response, Pt declined due to \"pain improving and physical needs met\"    -[phone]  Per RN Momo TAYLOR  declined this Pt due to complexity of care     Agency/Facility Name: [facility]   Spoke To: Rosy   Outcome: Per Rosy, no one in admissions is in today 06/02    Agency/Facility Name: [facility]  Spoke To: Jorgito  Outcome: Per Jorgito, referral has not been received yet.  [facility] confirmed fax number      Agency/Facility Name: [facility]   Outcome: Left vmail for admissions regarding referral     Agency/Facility Name: [facility]   Spoke To: Juan M  Outcome: Juan M requested [facility] resend referral with updated notes           "

## 2021-06-02 NOTE — PROGRESS NOTES
Surgery    No acute changes    /72   Pulse 82   Temp 36.1 °C (97 °F) (Temporal)   Resp 17   Ht 1.829 m (6')   Wt 115 kg (253 lb 8.5 oz)   SpO2 94%   BMI 34.38 kg/m²     Abdomen soft with incisional tenderness  Dressing in place  Wound packed  Ostomy site closed and healing    Assessment and plan  POD 6 after colostomy takedown with wound complications  Continue Dakin's packing for now  And for wound VAC placement tomorrow  Continue diet as tolerated  General care per medical service

## 2021-06-02 NOTE — PROGRESS NOTES
Report received from Day RNAshley  Assumed care of patient at 1900.    Pt is A&O x 4.  Pt is in good spirits.   Pain reported as tolerable.   Nausea denied at this time  Tolerating sips of boost and Diet   Surgical MLI dressed and CDI.  RUQ incision is approximated with dermabond.   + Urine output  + BM    + Flatus  Bed in lowest position and locked.  Bed alarm in place and on  Tele sitter in place  Pt resting comfortably now.  Review plan of care with patient  Call light within reach  Hourly rounds in place  All needs met at this time

## 2021-06-02 NOTE — PROGRESS NOTES
[facility] Medicine Daily Progress Note    Date of Service  6/2/2021    Chief Complaint  56 y.o. male admitted 2/9/2021 with Syncope.    Hospital Course  Admitted with syncope, he was noted to be bradycardic and hypotensive.  He has known history of permanent atrial fibrillation and he was on diltiazem, digoxin and metoprolol.  These medications were held.  Later on he was restarted on low-dose Toprol and Digoxin.  He was also started on anticoagulation with Eliquis.  He also had a possible history of adrenal insufficiency, he was already on Florinef and midodrine.  Midodrine was discontinued and he was started on cortisol. Further work-up showed that he had gram-negative bacteremia as well as urinary tract infection and acute kidney injury.  He was initially placed on IV Zosyn, later changed to oral Bactrim to finish the course.  He was carefully hydrated with IV fluids.  On admission he also expressed suicidal ideation.  Psychiatry was consulted the case, he was placed on a legal hold.  He was noted to have severe depression.  He has been placed on medication the form of Geodon, Paxil, Klonopin.  He has a colostomy in place, which the patient believes contributes to his severe depression because of ostomy care.  He had history of bowel perforation and splenic fluid collection requiring Segmental colectomy, colostomy creation, mobilization of splenic flexure, wound VAC placement, irrigation and debridement of flank wound on 11/10/2020.  Surgery was consulted on the case, and he underwent exploratory laparotomy and colostomy takedown and closure on 5/27/2021.  6/1- restarted antibiotic per surgery team. WBC continued to increase.     Interval Problem Update  Colostomy - pain mostly controlled  Suicidal - less thoughts  Afib - rate 100-120  Diabetes -   SRUTHI - crea trended down to 0.8  Low potassium and magnesium    6/1- pt denies pain, or other concerns. WBC increasing. Antibiotic was started per surgery team who  is concern for infection. CT abdomen ?? Still pending legal hold eval   6/2- pt continues to have tenderness on his surgical site. WBC improving on antibiotic. No need for CT scan. Legal hold pending. Wound vac per surgery.     Consultants/Specialty  Cardiology  Psychiatry  Surgery    Code Status  Full Code    Disposition  Legal Hold  SNF vs Inpatient psychiatry vs Group home    Review of Systems  Review of Systems   Constitutional: Positive for malaise/fatigue. Negative for chills, diaphoresis and fever.   HENT: Negative for congestion, hearing loss and sore throat.    Eyes: Negative for blurred vision.   Respiratory: Negative for cough, shortness of breath and wheezing.    Cardiovascular: Negative for chest pain, palpitations and leg swelling.   Gastrointestinal: Positive for abdominal pain. Negative for constipation, diarrhea, heartburn, nausea and vomiting.   Genitourinary: Negative for dysuria, flank pain and hematuria.   Musculoskeletal: Negative for back pain, joint pain, myalgias and neck pain.   Skin: Negative for rash.   Neurological: Positive for weakness. Negative for dizziness, sensory change, speech change, focal weakness and headaches.   Psychiatric/Behavioral: Positive for depression and suicidal ideas. Negative for hallucinations. The patient is not nervous/anxious.       Physical Exam  Temp:  [35.9 °C (96.6 °F)-37.2 °C (99 °F)] 36.1 °C (97 °F)  Pulse:  [82-98] 82  Resp:  [16-18] 17  BP: (120-135)/(72-96) 120/72  SpO2:  [93 %-94 %] 94 %    Physical Exam  Vitals and nursing note reviewed.   Constitutional:       Appearance: He is obese.   HENT:      Head: Normocephalic and atraumatic.      Nose: No congestion.      Mouth/Throat:      Mouth: Mucous membranes are moist.   Eyes:      Extraocular Movements: Extraocular movements intact.      Conjunctiva/sclera: Conjunctivae normal.      Pupils: Pupils are equal, round, and reactive to light.   Cardiovascular:      Rate and Rhythm: Normal rate. Rhythm  irregularly irregular.   Pulmonary:      Effort: Pulmonary effort is normal.      Breath sounds: Normal breath sounds.   Abdominal:      General: There is no distension.      Tenderness: There is abdominal tenderness. There is no guarding or rebound.      Comments: Dressing in place   Musculoskeletal:      Cervical back: No muscular tenderness.      Right lower leg: Edema present.      Left lower leg: Edema present.   Skin:     General: Skin is warm and dry.   Neurological:      General: No focal deficit present.      Mental Status: He is alert and oriented to person, place, and time.      Cranial Nerves: No cranial nerve deficit.   Psychiatric:         Mood and Affect: Mood is not anxious.         Speech: Speech normal.         Thought Content: Thought content is not paranoid or delusional. Thought content includes suicidal ideation. Thought content does not include homicidal ideation. Thought content does not include homicidal or suicidal plan.         Judgment: Judgment is not impulsive.       Fluids    Intake/Output Summary (Last 24 hours) at 6/2/2021 1211  Last data filed at 6/2/2021 0400  Gross per 24 hour   Intake 820 ml   Output 250 ml   Net 570 ml       Laboratory  Recent Labs     05/31/21  0503 06/01/21  0829 06/02/21  0618   WBC 11.4* 15.2* 11.6*   RBC 4.11* 4.93 4.31*   HEMOGLOBIN 11.7* 14.2 12.5*   HEMATOCRIT 36.8* 44.0 37.4*   MCV 89.5 89.2 86.8   MCH 28.5 28.8 29.0   MCHC 31.8* 32.3* 33.4*   RDW 48.9 48.5 47.6   PLATELETCT 208 277 231   MPV 11.4 10.5 10.2     Recent Labs     05/31/21  0503 06/01/21  0829 06/02/21  0618   SODIUM 138 140 138   POTASSIUM 3.3* 3.4* 3.2*   CHLORIDE 105 104 103   CO2 24 24 26   GLUCOSE 103* 122* 94   BUN 27* 21 20   CREATININE 0.83 0.77 0.82   CALCIUM 8.8 9.3 8.6                   Imaging  IR-MIDLINE CATHETER INSERTION WO GUIDANCE > AGE 3   Final Result                  Ultrasound-guided midline placement performed by qualified nursing staff    as above.           EC-ECHOCARDIOGRAM COMPLETE W/ CONT   Final Result      DX-CHEST-PORTABLE (1 VIEW)   Final Result      1.  Minor asymmetric interstitial opacity in the right lower lobe which could represent minor interstitial edema, pneumonia, or fibrotic change.      2.  No left lung consolidation.      3.  No evidence of congestive failure.      OM-[id number] VIEW   Final Result         No specific finding to suggest small bowel obstruction.      [id number] VIEW   Final Result         No significant interval change.      DX-CHEST-LIMITED (1 VIEW)   Final Result         Diffuse interstitial prominence could relate to mild pulmonary edema or atypical infection      [id number] VIEW   Final Result      Increased colonic gas without definite bowel obstruction.      US-RENAL   Final Result      No evidence of hydronephrosis.      Lobulated kidneys bilaterally.      CT-ABDOMEN-PELVIS WITH   Final Result      1.  There is no evidence of small bowel obstruction.   2.  There is a left lower quadrant ostomy.   3.  There is fluid distention of the colon distal to the surgical material in the left mid descending colon extending all the way to the rectum. There is no pneumatosis or free air.   4.  There is cholelithiasis without biliary dilatation.   5.  There has been interval removal of the drainage catheter anterior to the spleen with minimal hypodense area in the anterior spleen again noted. There is no new fluid collection.      IR-US GUIDED PIV   Final Result    Ultrasound-guided PERIPHERAL IV INSERTION performed by    qualified nursing staff as above.      EC-ECHOCARDIOGRAM COMPLETE W/O CONT   Final Result      DX-LUMBAR SPINE-2 OR 3 VIEWS   Final Result      Moderate compression deformity of T11 is new compared to 2018.      Mild wedge deformity of T12 is unchanged.      Degenerative changes including facet arthropathy.      Mild retrolisthesis of L5 on S1 and L3 on L4.              Assessment/Plan  * Syncope- (present on  admission)  Assessment & Plan  secondary to Hypotension and Bradycardia    UTI (urinary tract infection)- (present on admission)  Assessment & Plan  Completed 10-day course of Bactrim    Leukocytosis- (present on admission)  Assessment & Plan  Follow cbc    Obstructive uropathy- (present on admission)  Assessment & Plan  Bladder scan and straight cath prn    Gram-negative bacteremia- (present on admission)  Assessment & Plan  Completed 10-day course of Bactrim    Permanent atrial fibrillation (HCC)- (present on admission)  Assessment & Plan  Toprol, resume Digoxin  Resume Eliquis when cleared by Surgery     Chronic venous insufficiency of lower extremity- (present on admission)  Assessment & Plan  compression stockings.    Chronic heart failure with preserved ejection fraction (HCC)- (present on admission)  Assessment & Plan  Toprol  follow probnp    Type 2 diabetes mellitus with hyperglycemia, with long-term current use of insulin (HCC)- (present on admission)  Assessment & Plan  Follow bmp  Currently not requiring Insulin    Major depressive disorder, recurrent, severe with psychotic features (HCC)- (present on admission)  Assessment & Plan  Geodon, Paxil, Klonopin    Colostomy present (HCC)- (present on admission)  Assessment & Plan  Exploratory laparotomy with colostomy takedown and closure 5/27/2021  Segmental colectomy, colostomy creation, mobilization of splenic flexure, wound VAC placement, irrigation and debridement of flank wound on 11/10/2020.  Pain control  Encourage I-S  6/1- abdominal wound pictures reviewed. Review recs from surgery. Started on antibiotic. Continue to monitor   6/2- discuss with surgery. Continue oral antibiotic. WBC improving. Wound vac per surgery.     Hypothyroidism- (present on admission)  Assessment & Plan  Increased Levothyroxine to 125 mcg  Check TSH on 6/29/2021    SRUTHI (acute kidney injury) (HCC)- (present on admission)  Assessment & Plan  6/1- resolved     Adrenal  insufficiency (HCC)- (present on admission)  Assessment & Plan  Possible?  Cortef    Debility- (present on admission)  Assessment & Plan  PT and OT   vit d level pending    Suicidal ideation- (present on admission)  Assessment & Plan  Legal Hold   Psychiatry following    Orthostatic hypotension- (present on admission)  Assessment & Plan  Florinef    Mixed hyperlipidemia- (present on admission)  Assessment & Plan  Atorvastatin.     Lower limb amputation, great toe (HCC)- (present on admission)  Assessment & Plan  monitor    Hypomagnesemia- (present on admission)  Assessment & Plan  IV Mg 2 g  Follow level    Class 2 severe obesity with serious comorbidity and body mass index (BMI) of 35.0 to 35.9 in adult (HCC)- (present on admission)  Assessment & Plan  Body mass index is 35.19 kg/m².    Hypokalemia- (present on admission)  Assessment & Plan  Start Kdur  follow bmp  6/1- replace. Continue to monitor     Hyponatremia- (present on admission)  Assessment & Plan  6/1- resolved        VTE prophylaxis: Lovenox

## 2021-06-02 NOTE — DIETARY
Nutrition Services: Brief Update    Pt is status post colostomy takedown with wound complications.   Current diet is full liquid, high carb.  Pt previously receiving oral nutrition supplements (Boost Plus once per day at breakfast).  RD attempted visit with pt at bedside. Pt was sleeping and did not rouse to name.  Will add supplement order and Boost Plus Q day and follow up for preferences.    RD will continue to monitor per department guidelines.

## 2021-06-02 NOTE — THERAPY
"Occupational Therapy  Daily Treatment     Patient Name: Sebastián Castro  Age:  56 y.o., Sex:  male  Medical Record #: [id number]  Today's Date: 6/1/2021     Precautions: Fall Risk  Comments: abdominal wound    Assessment    Pt seen for OT tx, agreeable to OOB functional activities and to sit in chair for dinner as he agreed to sit up for all meals. Pt had difficulty standing from bed lowest height but was able to manage sit>stand with Leigh with slight bed elevation, then demonstrated functional transfer with Leigh/CGA for safety and participated in seated grooming and dressing ADLs. Pt is primarily limited by significant abdominal pain which limits his independence and activity tolerance for ADLs. Acute OT to continue to follow.    Plan    Continue current treatment plan.    DC Equipment Recommendations: Unable to determine at this time  Discharge Recommendations: Recommend post-acute placement for additional occupational therapy services prior to discharge home    Subjective    \"I'll try to sit here until dinner comes.\"      Objective     06/01/21 1733   Cognition    Comments flat affect, cooperative, soft spoken, limited conversation   Balance   Weight Shift Sitting Fair   Weight Shift Standing Poor   Skilled Intervention Verbal Cuing;Sequencing   Comments 4WW in standing   Bed Mobility    Supine to Sit Minimal Assist   Scooting Supervised   Skilled Intervention Verbal Cuing   Activities of Daily Living   Eating Modified Independent   Grooming Supervision;Seated  (face wash, oral care)   Upper Body Dressing Moderate Assist   Lower Body Dressing Maximal Assist   Skilled Intervention Verbal Cuing   Functional Mobility   Sit to Stand Minimal Assist   Bed, Chair, Wheelchair Transfer Minimal Assist  (CGA)   Toilet Transfers Refused   Transfer Method Stand Step   Mobility SPT w/FWW    Comments declined additional mobility d/t pain   Short Term Goals   Short Term Goal # 1 Pt will perform LB dressing w/ min A   Goal Outcome # 1 " Progressing as expected   Short Term Goal # 2 Pt will perform functional t/f's with supervision   Goal Outcome # 2 Progressing as expected   Short Term Goal # 3 Pt will perform h/g standing sink side with supervision   Goal Outcome # 3 Progressing slower than expected

## 2021-06-02 NOTE — CARE PLAN
Problem: Depression  Goal: Patient and family/caregiver will verbalize accurate information about at least two of the possible causes of depression, three-four of the signs and symptoms of depression  Outcome: Progressing    Shift Goals  Clinical Goals: safety  Patient Goals: rest    Progress made toward(s) clinical / shift goals:  Pt all fall precautions in place verbalizes understanding. PT endorses no SI or plan.     Patient is not progressing towards the following goals:

## 2021-06-02 NOTE — CARE PLAN
The patient is Stable - Low risk of patient condition declining or worsening    Shift Goals  Clinical Goals: safety   Patient Goals: rest     Progress made toward(s) clinical / shift goals:  pt is resting comfortably in bed. Bed alarm and telesitter are in use.    Patient is progressing towards the following goals:    Problem: Knowledge Deficit - Standard  Goal: Patient and family/care givers will demonstrate understanding of plan of care, disease process/condition, diagnostic tests and medications  Outcome: Progressing     Problem: Depression  Goal: Patient and family/caregiver will verbalize accurate information about at least two of the possible causes of depression, three-four of the signs and symptoms of depression  Outcome: Progressing

## 2021-06-02 NOTE — ANESTHESIA POSTPROCEDURE EVALUATION
Patient: Sebastián Castro    Procedure Summary     Date: 05/27/21 Room / Location: [facility] 06 / SURGERY [facility]    Anesthesia Start: 1400 Anesthesia Stop: 1648    Procedures:       COLOSTOMY TAKEDOWN AND CLOSURE (Abdomen)      LAPAROTOMY, EXPLORATORY Diagnosis: (colostomy status)    Surgeons: Kin Desouza D.O. Responsible Provider: Carmela Sarmiento M.D.    Anesthesia Type: general ASA Status: 3          Final Anesthesia Type: general  Last vitals  BP   Blood Pressure: 127/96    Temp   37.2 °C (99 °F)    Pulse   97   Resp   18    SpO2   93 %      Anesthesia Post Evaluation    Patient location during evaluation: PACU  Patient participation: complete - patient participated  Level of consciousness: awake and alert  Pain score: 1    Airway patency: patent  Anesthetic complications: no  Cardiovascular status: hemodynamically stable  Respiratory status: acceptable  Hydration status: euvolemic    PONV: none          No complications documented.     Nurse Pain Score: 2 (NPRS)

## 2021-06-02 NOTE — PROGRESS NOTES
"Pt had assisted fall with staff, x2 staff helping him back to bed from the chair. Pt was educated twice about correct and safe transfer, pt stated \" I'm going to fall\" was re positioned on the chair, educated to plant feet on the ground. The pt then did not plant feet on the ground and attempted to get up with walker and x2 assisting staff telling him its unsafe with his feet planted like that.   Pt then was with the help of staff slowly lowered to the ground in the sitting position. This RN was notified and pt after assessment was assisted back to bed.   All fall precautions were in place, pt with tele sitter at bedside with bed and chair alarm on. MD notified, orders received to observe pt, pharmacy notified.   "

## 2021-06-02 NOTE — PROGRESS NOTES
Bedside report received.  Assessment complete.  A&O x 4. Patient calls appropriately.  Patient ambulates with x1 assist and FWW. Bed alarm n/a.   Patient denies n/v. Tolerating clears diet.  Surgical MDI with dressing CDI changed TID, blanchable redness on sacrum.  + void, + flatus, + BM6/2.  Patient denies SOB.  SCD's refsed.  Review plan with of care with patient. Call light and personal belongings with in reach. Hourly rounding in place. All needs met at this time.

## 2021-06-03 LAB
ALBUMIN SERPL BCP-MCNC: 2.5 G/DL (ref 3.2–4.9)
ALBUMIN/GLOB SERPL: 0.8 G/DL
ALP SERPL-CCNC: 97 U/L (ref 30–99)
ALT SERPL-CCNC: 13 U/L (ref 2–50)
ANION GAP SERPL CALC-SCNC: 9 MMOL/L (ref 7–16)
AST SERPL-CCNC: 17 U/L (ref 12–45)
BASOPHILS # BLD AUTO: 0.4 % (ref 0–1.8)
BASOPHILS # BLD: 0.04 K/UL (ref 0–0.12)
BILIRUB SERPL-MCNC: 0.6 MG/DL (ref 0.1–1.5)
BUN SERPL-MCNC: 15 MG/DL (ref 8–22)
CALCIUM SERPL-MCNC: 8.2 MG/DL (ref 8.5–10.5)
CHLORIDE SERPL-SCNC: 104 MMOL/L (ref 96–112)
CO2 SERPL-SCNC: 26 MMOL/L (ref 20–33)
CREAT SERPL-MCNC: 0.74 MG/DL (ref 0.5–1.4)
EOSINOPHIL # BLD AUTO: 0.41 K/UL (ref 0–0.51)
EOSINOPHIL NFR BLD: 3.7 % (ref 0–6.9)
ERYTHROCYTE [DISTWIDTH] IN BLOOD BY AUTOMATED COUNT: 48.9 FL (ref 35.9–50)
GLOBULIN SER CALC-MCNC: 3.1 G/DL (ref 1.9–3.5)
GLUCOSE SERPL-MCNC: 116 MG/DL (ref 65–99)
HCT VFR BLD AUTO: 39.6 % (ref 42–52)
HGB BLD-MCNC: 12.7 G/DL (ref 14–18)
IMM GRANULOCYTES # BLD AUTO: 0.08 K/UL (ref 0–0.11)
IMM GRANULOCYTES NFR BLD AUTO: 0.7 % (ref 0–0.9)
LYMPHOCYTES # BLD AUTO: 2.87 K/UL (ref 1–4.8)
LYMPHOCYTES NFR BLD: 25.8 % (ref 22–41)
MCH RBC QN AUTO: 28.7 PG (ref 27–33)
MCHC RBC AUTO-ENTMCNC: 32.1 G/DL (ref 33.7–35.3)
MCV RBC AUTO: 89.6 FL (ref 81.4–97.8)
MONOCYTES # BLD AUTO: 0.9 K/UL (ref 0–0.85)
MONOCYTES NFR BLD AUTO: 8.1 % (ref 0–13.4)
NEUTROPHILS # BLD AUTO: 6.82 K/UL (ref 1.82–7.42)
NEUTROPHILS NFR BLD: 61.3 % (ref 44–72)
NRBC # BLD AUTO: 0 K/UL
NRBC BLD-RTO: 0 /100 WBC
PLATELET # BLD AUTO: 237 K/UL (ref 164–446)
PMV BLD AUTO: 10.2 FL (ref 9–12.9)
POTASSIUM SERPL-SCNC: 3.2 MMOL/L (ref 3.6–5.5)
PROT SERPL-MCNC: 5.6 G/DL (ref 6–8.2)
RBC # BLD AUTO: 4.42 M/UL (ref 4.7–6.1)
SODIUM SERPL-SCNC: 139 MMOL/L (ref 135–145)
WBC # BLD AUTO: 11.1 K/UL (ref 4.8–10.8)

## 2021-06-03 PROCEDURE — A9270 NON-COVERED ITEM OR SERVICE: HCPCS | Performed by: STUDENT IN AN ORGANIZED HEALTH CARE EDUCATION/TRAINING PROGRAM

## 2021-06-03 PROCEDURE — 700111 HCHG RX REV CODE 636 W/ 250 OVERRIDE (IP): Performed by: INTERNAL MEDICINE

## 2021-06-03 PROCEDURE — 36415 COLL VENOUS BLD VENIPUNCTURE: CPT

## 2021-06-03 PROCEDURE — 700102 HCHG RX REV CODE 250 W/ 637 OVERRIDE(OP): Performed by: NURSE PRACTITIONER

## 2021-06-03 PROCEDURE — 700102 HCHG RX REV CODE 250 W/ 637 OVERRIDE(OP): Performed by: HOSPITALIST

## 2021-06-03 PROCEDURE — 99232 SBSQ HOSP IP/OBS MODERATE 35: CPT | Performed by: INTERNAL MEDICINE

## 2021-06-03 PROCEDURE — A9270 NON-COVERED ITEM OR SERVICE: HCPCS | Performed by: NURSE PRACTITIONER

## 2021-06-03 PROCEDURE — 700102 HCHG RX REV CODE 250 W/ 637 OVERRIDE(OP): Performed by: INTERNAL MEDICINE

## 2021-06-03 PROCEDURE — 700102 HCHG RX REV CODE 250 W/ 637 OVERRIDE(OP): Performed by: SURGERY

## 2021-06-03 PROCEDURE — 700102 HCHG RX REV CODE 250 W/ 637 OVERRIDE(OP): Performed by: STUDENT IN AN ORGANIZED HEALTH CARE EDUCATION/TRAINING PROGRAM

## 2021-06-03 PROCEDURE — A9270 NON-COVERED ITEM OR SERVICE: HCPCS | Performed by: FAMILY MEDICINE

## 2021-06-03 PROCEDURE — 90791 PSYCH DIAGNOSTIC EVALUATION: CPT | Performed by: SOCIAL WORKER

## 2021-06-03 PROCEDURE — A9270 NON-COVERED ITEM OR SERVICE: HCPCS | Performed by: INTERNAL MEDICINE

## 2021-06-03 PROCEDURE — 700102 HCHG RX REV CODE 250 W/ 637 OVERRIDE(OP): Performed by: FAMILY MEDICINE

## 2021-06-03 PROCEDURE — 97608 NEG PRS WND THER NDME>50SQCM: CPT

## 2021-06-03 PROCEDURE — 700111 HCHG RX REV CODE 636 W/ 250 OVERRIDE (IP): Performed by: SURGERY

## 2021-06-03 PROCEDURE — 85025 COMPLETE CBC W/AUTO DIFF WBC: CPT

## 2021-06-03 PROCEDURE — 770001 HCHG ROOM/CARE - MED/SURG/GYN PRIV*

## 2021-06-03 PROCEDURE — A9270 NON-COVERED ITEM OR SERVICE: HCPCS | Performed by: SURGERY

## 2021-06-03 PROCEDURE — A9270 NON-COVERED ITEM OR SERVICE: HCPCS | Performed by: HOSPITALIST

## 2021-06-03 PROCEDURE — 80053 COMPREHEN METABOLIC PANEL: CPT

## 2021-06-03 RX ORDER — MAGNESIUM SULFATE HEPTAHYDRATE 40 MG/ML
2 INJECTION, SOLUTION INTRAVENOUS ONCE
Status: COMPLETED | OUTPATIENT
Start: 2021-06-03 | End: 2021-06-03

## 2021-06-03 RX ORDER — POTASSIUM CHLORIDE 20 MEQ/1
40 TABLET, EXTENDED RELEASE ORAL 2 TIMES DAILY
Status: COMPLETED | OUTPATIENT
Start: 2021-06-03 | End: 2021-06-04

## 2021-06-03 RX ADMIN — CIPROFLOXACIN HYDROCHLORIDE 500 MG: 500 TABLET, FILM COATED ORAL at 17:57

## 2021-06-03 RX ADMIN — CLONAZEPAM 0.5 MG: 0.5 TABLET ORAL at 08:13

## 2021-06-03 RX ADMIN — ATORVASTATIN CALCIUM 40 MG: 40 TABLET, FILM COATED ORAL at 17:57

## 2021-06-03 RX ADMIN — OXYCODONE 10 MG: 5 TABLET ORAL at 15:18

## 2021-06-03 RX ADMIN — POTASSIUM CHLORIDE 40 MEQ: 1500 TABLET, EXTENDED RELEASE ORAL at 05:51

## 2021-06-03 RX ADMIN — CLONAZEPAM 1 MG: 1 TABLET ORAL at 17:57

## 2021-06-03 RX ADMIN — METRONIDAZOLE 500 MG: 500 TABLET ORAL at 21:09

## 2021-06-03 RX ADMIN — ZIPRASIDONE HYDROCHLORIDE 60 MG: 60 CAPSULE ORAL at 08:16

## 2021-06-03 RX ADMIN — METRONIDAZOLE 500 MG: 500 TABLET ORAL at 05:52

## 2021-06-03 RX ADMIN — APIXABAN 5 MG: 5 TABLET, FILM COATED ORAL at 17:57

## 2021-06-03 RX ADMIN — HYDROCORTISONE 10 MG: 10 TABLET ORAL at 05:52

## 2021-06-03 RX ADMIN — DIGOXIN 125 MCG: 125 TABLET ORAL at 17:57

## 2021-06-03 RX ADMIN — POTASSIUM CHLORIDE 40 MEQ: 1500 TABLET, EXTENDED RELEASE ORAL at 17:57

## 2021-06-03 RX ADMIN — MAGNESIUM SULFATE 2 G: 2 INJECTION INTRAVENOUS at 08:14

## 2021-06-03 RX ADMIN — PAROXETINE HYDROCHLORIDE 20 MG: 20 TABLET, FILM COATED ORAL at 08:14

## 2021-06-03 RX ADMIN — LEVOTHYROXINE SODIUM 125 MCG: 0.12 TABLET ORAL at 05:52

## 2021-06-03 RX ADMIN — FLUDROCORTISONE ACETATE 0.1 MG: 0.1 TABLET ORAL at 05:52

## 2021-06-03 RX ADMIN — HYDROMORPHONE HYDROCHLORIDE 0.5 MG: 1 INJECTION, SOLUTION INTRAMUSCULAR; INTRAVENOUS; SUBCUTANEOUS at 09:48

## 2021-06-03 RX ADMIN — CIPROFLOXACIN HYDROCHLORIDE 500 MG: 500 TABLET, FILM COATED ORAL at 05:52

## 2021-06-03 RX ADMIN — METRONIDAZOLE 500 MG: 500 TABLET ORAL at 15:18

## 2021-06-03 RX ADMIN — ENOXAPARIN SODIUM 40 MG: 40 INJECTION SUBCUTANEOUS at 05:51

## 2021-06-03 RX ADMIN — OXYCODONE 10 MG: 5 TABLET ORAL at 08:13

## 2021-06-03 RX ADMIN — METOPROLOL SUCCINATE 12.5 MG: 25 TABLET, EXTENDED RELEASE ORAL at 05:52

## 2021-06-03 RX ADMIN — SODIUM HYPOCHLORITE 1 ML: 1.25 SOLUTION TOPICAL at 05:51

## 2021-06-03 RX ADMIN — ZIPRASIDONE HYDROCHLORIDE 60 MG: 60 CAPSULE ORAL at 21:09

## 2021-06-03 RX ADMIN — HYDROCORTISONE 5 MG: 10 TABLET ORAL at 15:18

## 2021-06-03 ASSESSMENT — ENCOUNTER SYMPTOMS
NERVOUS/ANXIOUS: 0
ABDOMINAL PAIN: 1
NAUSEA: 0
FLANK PAIN: 0
SENSORY CHANGE: 0
NECK PAIN: 0
CONSTIPATION: 0
PALPITATIONS: 0
WEAKNESS: 1
MYALGIAS: 0
DEPRESSION: 1
DIARRHEA: 0
VOMITING: 0
DIZZINESS: 0
SORE THROAT: 0
SPEECH CHANGE: 0
HEADACHES: 0
CHILLS: 0
BLURRED VISION: 0
COUGH: 0
DIAPHORESIS: 0
BACK PAIN: 0
WHEEZING: 0
FEVER: 0
HEARTBURN: 0
SHORTNESS OF BREATH: 0
HALLUCINATIONS: 0
FOCAL WEAKNESS: 0

## 2021-06-03 ASSESSMENT — PAIN DESCRIPTION - PAIN TYPE
TYPE: ACUTE PAIN
TYPE: ACUTE PAIN;SURGICAL PAIN

## 2021-06-03 NOTE — CARE PLAN
The patient is Stable - Low risk of patient condition declining or worsening    Shift Goals  Clinical Goals: pain management  Patient Goals: rest    Progress made toward(s) clinical / shift goals:  Pain was well managed throughout shift.    Problem: Pain - Standard  Goal: Alleviation of pain or a reduction in pain to the patient’s comfort goal  Outcome: Progressing       Patient is not progressing towards the following goals:

## 2021-06-03 NOTE — WOUND TEAM
[facility]  Inpatient Services  Wound and Skin Care Progress Note    Admission Date: 2/9/2021     Last order of IP CONSULT TO WOUND CARE was found on 3/18/2021 from Hospital Encounter on 2/9/2021     HPI, PMH, SH: Reviewed    Past Surgical History:   Procedure Laterality Date   • PB EXPLORATORY OF ABDOMEN  5/27/2021    Procedure: LAPAROTOMY, EXPLORATORY;  Surgeon: Kin Desouza D.O.;  Location: [facility];  Service: General   • COLOSTOMY TAKEDOWN  5/27/2021    Procedure: COLOSTOMY TAKEDOWN AND CLOSURE;  Surgeon: Kin Desouza D.O.;  Location: SURGERY [facility];  Service: General   • COLECTOMY N/A 11/10/2020    Procedure: COLECTOMY-SEGMENTAL, COLOSTOMY, MOBILIZATION OF SPLENIC FLEXURE, WOUND VAC PLACEMENT, IRRIGATION AND DEBRIDMENT FLANK WOUND;  Surgeon: Kin Desouza D.O.;  Location: [facility];  Service: General   • ZZZ CARDIAC CATH  07/21/2018    EF 45%, normal coronaries.   • IRRIGATION & DEBRIDEMENT ORTHO Right 2/19/2018    Procedure: IRRIGATION & DEBRIDEMENT ORTHO-FOOT;  Surgeon: Kirby Lopez M.D.;  Location: [facility];  Service: Orthopedics   • TOE AMPUTATION Right 1/17/2018    Procedure: TOE AMPUTATION-1ST RAY;  Surgeon: Doug Delong M.D.;  Location: [facility];  Service: Orthopedics   • TOE AMPUTATION Right 11/10/2017    Procedure: TOE AMPUTATION;  Surgeon: Osorio Leo M.D.;  Location: [facility];  Service: Orthopedics     Social History     Tobacco Use   • Smoking status: Never Smoker   • Smokeless tobacco: Never Used   Substance Use Topics   • Alcohol use: No     Chief Complaint   Patient presents with   • Syncope     X1 this morning when standing up to go to bathroom     Diagnosis: Syncope and collapse [R55]  Suicidal ideation [R45.851]  Sepsis due to Klebsiella pneumoniae (HCC) [A41.4]    Unit where seen by Wound Team: T418/00     WOUND CONSULT/FOLLOW UP RELATED TO: abdominal wound    WOUND HISTORY:  Pt is an older  gentleman well known to [facility] wound team 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 Pt currently admitted since 2/9/21 related to SI concerns due to body image issues related to colostomy.      *Pt underwent surgical reversal of colostomy with Dr. Desouza on 5/20/21. MLI was re-opened at that time and was then closed with interrupted staples and packing. Unfortunately MLI continued to Dehisce and therefore wound team was consulted to further manage MLI with dakins packing.     WOUND ASSESSMENT/LDA     Negative Pressure Wound Therapy 06/03/21 Abdomen (Active)   Vacuum Serial Number [id number]    NPWT Pump Mode / Pressure Setting Ulta;Continuous;125 mmHg    Dressing Type Medium;Black Foam (Regular)    Number of Foam Pieces Used 3    Canister Changed No    NEXT Dressing Change/Treatment Date 06/05/21          Wound 05/27/21 Full Thickness Wound Abdomen (Active)   Wound Image      06/01/21 0900   Site Assessment Pink;Red;Yellow    Periwound Assessment Intact    Margins Attached edges    Closure Secondary intention    Drainage Amount Small    Drainage Description Serosanguineous    Treatments Cleansed;Site care    Wound Cleansing Dakin's Solution    Periwound Protectant Skin Protectant Wipes to Periwound;Drape    Dressing Cleansing/Solutions Not Applicable    Dressing Options Wound Vac    Dressing Changed New    Dressing Status Clean;Dry;Intact    Dressing Change/Treatment Frequency Tuesday, Thursday, Saturday, and As Needed    NEXT Dressing Change/Treatment Date 06/05/21    NEXT Weekly Photo (Inpatient Only) 06/15/21    Non-staged Wound Description Full thickness    Wound Length (cm) 21 cm    Wound Width (cm) 4.5 cm    Wound Depth (cm) 4 cm    Wound Surface Area (cm^2) 94.5 cm^2    Wound Volume (cm^3) 378 cm^3    Wound Healing % -362    Shape Linear Irregular    Wound Odor None    Exposed Structures None    WOUND NURSE ONLY - Time Spent with Patient (mins) 60      Vascular:    SHAYAN:   No results  found.    Lab Values:    Lab Results   Component Value Date/Time    WBC 11.1 (H) 06/03/2021 03:59 AM    RBC 4.42 (L) 06/03/2021 03:59 AM    HEMOGLOBIN 12.7 (L) 06/03/2021 03:59 AM    HEMATOCRIT 39.6 (L) 06/03/2021 03:59 AM    CREACTPROT 7.52 (H) 11/29/2020 01:25 AM    SEDRATEWES 13 10/23/2018 08:02 AM    HBA1C 5.2 02/14/2021 01:30 AM      Culture Results show:  No results found for this or any previous visit (from the past 720 hour(s)).    Pain Level/Medicated:  IV Morphine immediately prior to dressing change      INTERVENTIONS BY WOUND TEAM:  Chart and images reviewed. Discussed with bedside RN. All areas of concern (based on picture review, LDA review and discussion with bedside RN) have been thoroughly assessed. Documentation of areas based on significant findings. This RN in to assess patient. Performed standard wound care which includes appropriate positioning, dressing removal and non-selective debridement. Pictures and measurements obtained weekly if/when required.              ABD:  Preparation for Dressing removal:  Dressing removed without issue.  Cleansed with: Dakin's and gauze  Sharp debridement: NA  Joya wound: Cleansed with Dakin's and gauze, mepitel one to distal and proximal staples.   Primary Dressing: 3 pieces of black foam.  Secondary (Outer) Dressing:  Sealed with drape, hole cut and track pad applied.     Interdisciplinary consultation: Patient, Bedside RN (Ismael), & General Surgery (Dr. Desouza)    EVALUATION / RATIONALE FOR TREATMENT:  Most Recent Date:  06/03/21:  Fascial sutures intact, wound VAC placed with DR. Desouza.      6/1/21: Wound appears  than yesterdays pictures. Discussed with Dr. Desouza, plan to continue dakins TID for 48hrs, CT scan ordered today. Will re-evaluate on Thursday for potential vac placement.   5/31/21: Wound initially with interrupted staples and packing now with dehiscence. Per Dr. Baeza request wound team initiated dakins packing. Pt may benefit from  NPWT to assist with closure.   5/8/21: wound nearly healed. Does not require advanced wound care. Wound team signing off and nursing to re consult if site worsens. Continue with dressing care per order.  4/21/21: abd wound improving. Nursing to continue dsg changes per order.  4/15: Right posterior thigh DTI now resolved. Pink and blanching. Abdominal wound to surface level continuing with dressing POC.  4/7/21: Thigh wound nearly healed and will likely be resolved at next assessment. Abdomen wound appears larger. Continued with Arti to move through inflammatory phase. Hydrofera blue for its bacteriostatic properties and to assist in drainage management.   03/31/21: thigh wound healing well now to a stage 2.  Continue current plan.  All interventions in place.   ABD more denuded and bloody.  Arti to assist in moving wound through inflammatory phase.   3/23/21: Right posterior thigh wound remains a DTI but appears to be evolving into a stage 2. Wound team was previously consulted regarding sacral wound which appeared to be a skin tear however skin now appears to be intact however discolored. Does not appear to be pressure related as pt is on a waffle, self mobile and sacral mepilex is in use. Area is also not a normal pressure injury shape. Wound team will continue to follow.   3/18/21: Pt with linear DTIs to R posterior thigh from lying on top of frye cath tubing. Offloading precautions ordered. Wound team to continue following.     Goals: Steady decrease in wound area and depth weekly.    WOUND TEAM PLAN OF CARE ([X] for frequency of wound follow up,):   Nursing to follow orders written for wound care. Contact wound team if area fails to progress, deteriorates or with any questions/concerns  Dressing changes by wound team:                   Follow up 3 times weekly:                NPWT change 3 times weekly:   X  Follow up 1-2 times weekly:     Follow up Bi-Monthly:                   Follow up as needed:    Other (explain):     NURSING PLAN OF CARE ORDERS (X):  Dressing changes: See Dressing Care orders: X  Skin care: See Skin Care orders: x  RN Prevention Protocol: x  Rectal tube care: See Rectal Tube Care orders:   Other orders:        Anticipated discharge plans:   LTACH:        SNF/Rehab:                  Home Health Care:           Outpatient Wound Center:            Self/Family Care:        Other:   Group Home

## 2021-06-03 NOTE — DISCHARGE PLANNING
Agency/Facility Name: [facility]   Outcome: Per vmail response, PT declined, but there was no statement as to why    Agency/Facility Name: [facility]  Outcome: Left vmail for admissions inquiring on why there was a declination     Agency/Facility Name: [facility]  Outcome: Unable to leave vmail     Agency/Facility Name: [facility]   Spoke To: Richelle  Outcome: Per Richelle, this Pt was declined due to wound care needs     Agency/Facility Name: [facility]  Spoke To: Jorgito  Outcome: Per Jorgito, referral was received.  [facility] may be able to accept this Pt when wound requires less care

## 2021-06-03 NOTE — DISCHARGE PLANNING
Anticipated Discharge Disposition: TBD    Action: This case was discussed yesterday during Round Table Complex Discharge Meeting. LSW reported referrals were sent to all the inpatient psych hospital and at this time, there is no accepting facility due to complex wound care needs. Airam, Manager of  recommended postponing the referrals to inpatient psych until this the medical needs resolve, DPA aware.    Barriers to Discharge: Accepting Facility.    Plan: As Above.

## 2021-06-03 NOTE — DISCHARGE PLANNING
Received Order in Response to Request for Court Ordered Involuntary Admission from the court continuing pt until 6/10 at 0900. Sent copy to JAMES López, scanned copy into pt's chart.

## 2021-06-03 NOTE — DISCHARGE PLANNING
Anticipated Discharge Disposition:   In Pt Psych Facility    Action:   This RN CM is following the case. Pt;on legal hold up to 6/3/21 .    Received a call from Admissions at [facility]  on 6/2/21 that Pt was declined due to complex care needs.    The case was escalated to Leaders Airam ,SW Manager and Joselin Billings  On 6/2/21 .  Per Airam , plan is not to send referral to In Pt Psych Facilities for now until Pt has complex care needs.     Per Caprice Jorge, Legal hold DPA . Pt's Legal Hold is extended up to 6/10/21. Legal Hold copy on file.      Barriers to Discharge:   Pending medical clearance  Pending In Pt Psych Facility acceptance    Plan:   Will continue to assist Pt with discharge as needed

## 2021-06-03 NOTE — PROGRESS NOTES
Surgery    Feels better  Eating more  Having bowel movements  Afebrile    /83   Pulse 97   Temp 36.6 °C (97.8 °F) (Temporal)   Resp 16   Ht 1.829 m (6')   Wt 115 kg (253 lb 8.5 oz)   SpO2 93%   BMI 34.38 kg/m²     Patient is awake and alert  Nontoxic-appearing  Abdomen soft with periwound tenderness  Ostomy site closed and healing  Midline wound opened and improving appearance  VAC placed with wound care nurse    Recent Labs     06/01/21  0829 06/02/21  0618 06/03/21  0359   WBC 15.2* 11.6* 11.1*   RBC 4.93 4.31* 4.42*   HEMOGLOBIN 14.2 12.5* 12.7*   HEMATOCRIT 44.0 37.4* 39.6*   MCV 89.2 86.8 89.6   MCH 28.8 29.0 28.7   RDW 48.5 47.6 48.9   PLATELETCT 277 231 237   MPV 10.5 10.2 10.2   NEUTSPOLYS 72.80* 61.50 61.30   LYMPHOCYTES 15.10* 24.70 25.80   MONOCYTES 7.80 8.60 8.10   EOSINOPHILS 3.30 4.00 3.70   BASOPHILS 0.50 0.40 0.40     Assessment and plan:  Status post takedown of colostomy with open abdominal wall wound  VAC placed today  Will meet with wound care to change VAC on Saturday at 9 AM  Diet as tolerated  Mobilize    General management per medical service

## 2021-06-03 NOTE — PROGRESS NOTES
[facility] Medicine Daily Progress Note    Date of Service  6/3/2021    Chief Complaint  56 y.o. male admitted 2/9/2021 with Syncope.    Hospital Course  Admitted with syncope, he was noted to be bradycardic and hypotensive.  He has known history of permanent atrial fibrillation and he was on diltiazem, digoxin and metoprolol.  These medications were held.  Later on he was restarted on low-dose Toprol and Digoxin.  He was also started on anticoagulation with Eliquis.  He also had a possible history of adrenal insufficiency, he was already on Florinef and midodrine.  Midodrine was discontinued and he was started on cortisol. Further work-up showed that he had gram-negative bacteremia as well as urinary tract infection and acute kidney injury.  He was initially placed on IV Zosyn, later changed to oral Bactrim to finish the course.  He was carefully hydrated with IV fluids.  On admission he also expressed suicidal ideation.  Psychiatry was consulted the case, he was placed on a legal hold.  He was noted to have severe depression.  He has been placed on medication the form of Geodon, Paxil, Klonopin.  He has a colostomy in place, which the patient believes contributes to his severe depression because of ostomy care.  He had history of bowel perforation and splenic fluid collection requiring Segmental colectomy, colostomy creation, mobilization of splenic flexure, wound VAC placement, irrigation and debridement of flank wound on 11/10/2020.  Surgery was consulted on the case, and he underwent exploratory laparotomy and colostomy takedown and closure on 5/27/2021.  6/1- restarted antibiotic per surgery team. WBC stable since started on antibiotic per surgery.     Interval Problem Update  Colostomy - pain mostly controlled  Suicidal - less thoughts  Afib - rate 100-120  Diabetes -   SRUTHI - crea trended down to 0.8  Low potassium and magnesium    6/1- pt denies pain, or other concerns. WBC increasing. Antibiotic was  started per surgery team who is concern for infection. CT abdomen ?? Still pending legal hold eval   6/2- pt continues to have tenderness on his surgical site. WBC improving on antibiotic. No need for CT scan. Legal hold pending. Wound vac per surgery.   6/3- wound vac placed today. He complains of some abdominal soreness. No other event. Cannot be [facility] with wound vac. Continue care inpatient. WBC stable. Still sometimes tachycardic. Continue to  Monitor. Discussed with surgery, eliquis restarted     Consultants/Specialty  Cardiology  Psychiatry  Surgery    Code Status  Full Code    Disposition  Legal Hold  SNF vs Inpatient psychiatry vs Group home    Review of Systems  Review of Systems   Constitutional: Positive for malaise/fatigue. Negative for chills, diaphoresis and fever.   HENT: Negative for congestion, hearing loss and sore throat.    Eyes: Negative for blurred vision.   Respiratory: Negative for cough, shortness of breath and wheezing.    Cardiovascular: Negative for chest pain, palpitations and leg swelling.   Gastrointestinal: Positive for abdominal pain. Negative for constipation, diarrhea, heartburn, nausea and vomiting.   Genitourinary: Negative for dysuria, flank pain and hematuria.   Musculoskeletal: Negative for back pain, joint pain, myalgias and neck pain.   Skin: Negative for rash.   Neurological: Positive for weakness. Negative for dizziness, sensory change, speech change, focal weakness and headaches.   Psychiatric/Behavioral: Positive for depression. Negative for hallucinations and suicidal ideas. The patient is not nervous/anxious.       Physical Exam  Temp:  [36 °C (96.8 °F)-36.6 °C (97.8 °F)] 36.6 °C (97.8 °F)  Pulse:  [87-97] 97  Resp:  [16-17] 16  BP: (106-135)/(63-83) 135/83  SpO2:  [92 %-94 %] 93 %    Physical Exam  Vitals and nursing note reviewed.   Constitutional:       Appearance: He is obese.   HENT:      Head: Normocephalic and atraumatic.      Nose: No  congestion.      Mouth/Throat:      Mouth: Mucous membranes are moist.   Eyes:      Extraocular Movements: Extraocular movements intact.      Conjunctiva/sclera: Conjunctivae normal.      Pupils: Pupils are equal, round, and reactive to light.   Cardiovascular:      Rate and Rhythm: Normal rate. Rhythm irregularly irregular.   Pulmonary:      Effort: Pulmonary effort is normal.      Breath sounds: Normal breath sounds.   Abdominal:      General: There is no distension.      Tenderness: There is abdominal tenderness. There is no guarding or rebound.      Comments: Dressing in place   Musculoskeletal:      Cervical back: No muscular tenderness.      Right lower leg: Edema present.      Left lower leg: Edema present.   Skin:     General: Skin is warm and dry.   Neurological:      General: No focal deficit present.      Mental Status: He is alert and oriented to person, place, and time.      Cranial Nerves: No cranial nerve deficit.   Psychiatric:         Mood and Affect: Mood is not anxious.         Speech: Speech normal.         Thought Content: Thought content is not paranoid or delusional. Thought content includes suicidal ideation. Thought content does not include homicidal ideation. Thought content does not include homicidal or suicidal plan.         Judgment: Judgment is not impulsive.       Fluids    Intake/Output Summary (Last 24 hours) at 6/3/2021 1249  Last data filed at 6/3/2021 0730  Gross per 24 hour   Intake --   Output 125 ml   Net -125 ml       Laboratory  Recent Labs     06/01/21  0829 06/02/21  0618 06/03/21  0359   WBC 15.2* 11.6* 11.1*   RBC 4.93 4.31* 4.42*   HEMOGLOBIN 14.2 12.5* 12.7*   HEMATOCRIT 44.0 37.4* 39.6*   MCV 89.2 86.8 89.6   MCH 28.8 29.0 28.7   MCHC 32.3* 33.4* 32.1*   RDW 48.5 47.6 48.9   PLATELETCT 277 231 237   MPV 10.5 10.2 10.2     Recent Labs     06/01/21  0829 06/02/21  0618 06/03/21  0359   SODIUM 140 138 139   POTASSIUM 3.4* 3.2* 3.2*   CHLORIDE 104 103 104   CO2 24 26 26    GLUCOSE 122* 94 116*   BUN 21 20 15   CREATININE 0.77 0.82 0.74   CALCIUM 9.3 8.6 8.2*                   Imaging  IR-MIDLINE CATHETER INSERTION WO GUIDANCE > AGE 3   Final Result                  Ultrasound-guided midline placement performed by qualified nursing staff    as above.          EC-ECHOCARDIOGRAM COMPLETE W/ CONT   Final Result      DX-CHEST-PORTABLE (1 VIEW)   Final Result      1.  Minor asymmetric interstitial opacity in the right lower lobe which could represent minor interstitial edema, pneumonia, or fibrotic change.      2.  No left lung consolidation.      3.  No evidence of congestive failure.      IG-SSPSCUM-1 VIEW   Final Result         No specific finding to suggest small bowel obstruction.      OM-LQZXNNP-1 VIEW   Final Result         No significant interval change.      DX-CHEST-LIMITED (1 VIEW)   Final Result         Diffuse interstitial prominence could relate to mild pulmonary edema or atypical infection      WK-ECASYZZ-4 VIEW   Final Result      Increased colonic gas without definite bowel obstruction.      US-RENAL   Final Result      No evidence of hydronephrosis.      Lobulated kidneys bilaterally.      CT-ABDOMEN-PELVIS WITH   Final Result      1.  There is no evidence of small bowel obstruction.   2.  There is a left lower quadrant ostomy.   3.  There is fluid distention of the colon distal to the surgical material in the left mid descending colon extending all the way to the rectum. There is no pneumatosis or free air.   4.  There is cholelithiasis without biliary dilatation.   5.  There has been interval removal of the drainage catheter anterior to the spleen with minimal hypodense area in the anterior spleen again noted. There is no new fluid collection.      IR-US GUIDED PIV   Final Result    Ultrasound-guided PERIPHERAL IV INSERTION performed by    qualified nursing staff as above.      EC-ECHOCARDIOGRAM COMPLETE W/O CONT   Final Result      DX-LUMBAR SPINE-2 OR 3 VIEWS   Final  Result      Moderate compression deformity of T11 is new compared to 2018.      Mild wedge deformity of T12 is unchanged.      Degenerative changes including facet arthropathy.      Mild retrolisthesis of L5 on S1 and L3 on L4.              Assessment/Plan  * Syncope- (present on admission)  Assessment & Plan  secondary to Hypotension and Bradycardia    UTI (urinary tract infection)- (present on admission)  Assessment & Plan  Completed 10-day course of Bactrim    Leukocytosis- (present on admission)  Assessment & Plan  Follow cbc    Obstructive uropathy- (present on admission)  Assessment & Plan  Bladder scan and straight cath prn    Gram-negative bacteremia- (present on admission)  Assessment & Plan  Completed 10-day course of Bactrim    Permanent atrial fibrillation (HCC)- (present on admission)  Assessment & Plan  Toprol, resume Digoxin  6/3- discussed with surgery. eliquis resumed     Chronic venous insufficiency of lower extremity- (present on admission)  Assessment & Plan  compression stockings.    Chronic heart failure with preserved ejection fraction (HCC)- (present on admission)  Assessment & Plan  Toprol  follow probnp    Type 2 diabetes mellitus with hyperglycemia, with long-term current use of insulin (HCC)- (present on admission)  Assessment & Plan  Follow bmp  Currently not requiring Insulin    Major depressive disorder, recurrent, severe with psychotic features (HCC)- (present on admission)  Assessment & Plan  Geodon, Paxil, Klonopin    Colostomy present (HCC)- (present on admission)  Assessment & Plan  Exploratory laparotomy with colostomy takedown and closure 5/27/2021  Segmental colectomy, colostomy creation, mobilization of splenic flexure, wound VAC placement, irrigation and debridement of flank wound on 11/10/2020.  Colostomy reversed 5/27  Pain control  Encourage I-S  6/1- abdominal wound pictures reviewed. Review recs from surgery. Started on antibiotic. Continue to monitor   6/2- discuss with  surgery. Continue oral antibiotic. WBC improving. Wound vac per surgery.   6/3- wound vac placed. Continue wound care, antibiotic per surgery recs. Continue to monitor       Hypothyroidism- (present on admission)  Assessment & Plan  Increased Levothyroxine to 125 mcg  Check TSH on 2021    SRUTHI (acute kidney injury) (HCC)- (present on admission)  Assessment & Plan  - resolved     Adrenal insufficiency (HCC)- (present on admission)  Assessment & Plan  Possible?  Cortef    Debility- (present on admission)  Assessment & Plan  PT and OT   vit d level low - start replacement     Suicidal ideation- (present on admission)  Assessment & Plan  Legal Hold will  6/3- psych need to extent if appropriate.   If pt is off legal hold he can be transfer to SNF   Psychiatry following    Orthostatic hypotension- (present on admission)  Assessment & Plan  Florinef    Mixed hyperlipidemia- (present on admission)  Assessment & Plan  Atorvastatin.     Lower limb amputation, great toe (HCC)- (present on admission)  Assessment & Plan  monitor    Hypomagnesemia- (present on admission)  Assessment & Plan  6/3-IV Mg 2 g  Follow level    Class 2 severe obesity with serious comorbidity and body mass index (BMI) of 35.0 to 35.9 in adult (HCC)- (present on admission)  Assessment & Plan  Body mass index is 35.19 kg/m².    Hypokalemia- (present on admission)  Assessment & Plan  Start Kdur  follow bmp  - replace. Continue to monitor   6/3- replaced. magnesium too. Continue to monitor     Hyponatremia- (present on admission)  Assessment & Plan  - resolved        VTE prophylaxis: Lovenox

## 2021-06-04 LAB
ALBUMIN SERPL BCP-MCNC: 2.4 G/DL (ref 3.2–4.9)
ALBUMIN/GLOB SERPL: 0.7 G/DL
ALP SERPL-CCNC: 95 U/L (ref 30–99)
ALT SERPL-CCNC: 14 U/L (ref 2–50)
ANION GAP SERPL CALC-SCNC: 10 MMOL/L (ref 7–16)
AST SERPL-CCNC: 18 U/L (ref 12–45)
BASOPHILS # BLD AUTO: 0.5 % (ref 0–1.8)
BASOPHILS # BLD: 0.06 K/UL (ref 0–0.12)
BILIRUB SERPL-MCNC: 0.6 MG/DL (ref 0.1–1.5)
BUN SERPL-MCNC: 13 MG/DL (ref 8–22)
CALCIUM SERPL-MCNC: 8.4 MG/DL (ref 8.5–10.5)
CHLORIDE SERPL-SCNC: 105 MMOL/L (ref 96–112)
CO2 SERPL-SCNC: 25 MMOL/L (ref 20–33)
CREAT SERPL-MCNC: 0.91 MG/DL (ref 0.5–1.4)
EOSINOPHIL # BLD AUTO: 0.45 K/UL (ref 0–0.51)
EOSINOPHIL NFR BLD: 3.7 % (ref 0–6.9)
ERYTHROCYTE [DISTWIDTH] IN BLOOD BY AUTOMATED COUNT: 50 FL (ref 35.9–50)
GLOBULIN SER CALC-MCNC: 3.3 G/DL (ref 1.9–3.5)
GLUCOSE SERPL-MCNC: 111 MG/DL (ref 65–99)
HCT VFR BLD AUTO: 43.3 % (ref 42–52)
HGB BLD-MCNC: 13.7 G/DL (ref 14–18)
IMM GRANULOCYTES # BLD AUTO: 0.1 K/UL (ref 0–0.11)
IMM GRANULOCYTES NFR BLD AUTO: 0.8 % (ref 0–0.9)
LYMPHOCYTES # BLD AUTO: 2.26 K/UL (ref 1–4.8)
LYMPHOCYTES NFR BLD: 18.7 % (ref 22–41)
MAGNESIUM SERPL-MCNC: 1.3 MG/DL (ref 1.5–2.5)
MCH RBC QN AUTO: 28.7 PG (ref 27–33)
MCHC RBC AUTO-ENTMCNC: 31.6 G/DL (ref 33.7–35.3)
MCV RBC AUTO: 90.8 FL (ref 81.4–97.8)
MONOCYTES # BLD AUTO: 0.89 K/UL (ref 0–0.85)
MONOCYTES NFR BLD AUTO: 7.4 % (ref 0–13.4)
NEUTROPHILS # BLD AUTO: 8.32 K/UL (ref 1.82–7.42)
NEUTROPHILS NFR BLD: 68.9 % (ref 44–72)
NRBC # BLD AUTO: 0 K/UL
NRBC BLD-RTO: 0 /100 WBC
PHOSPHATE SERPL-MCNC: 3.4 MG/DL (ref 2.5–4.5)
PLATELET # BLD AUTO: 262 K/UL (ref 164–446)
PMV BLD AUTO: 10.5 FL (ref 9–12.9)
POTASSIUM SERPL-SCNC: 3.4 MMOL/L (ref 3.6–5.5)
PROT SERPL-MCNC: 5.7 G/DL (ref 6–8.2)
RBC # BLD AUTO: 4.77 M/UL (ref 4.7–6.1)
SODIUM SERPL-SCNC: 140 MMOL/L (ref 135–145)
WBC # BLD AUTO: 12.1 K/UL (ref 4.8–10.8)

## 2021-06-04 PROCEDURE — A9270 NON-COVERED ITEM OR SERVICE: HCPCS | Performed by: STUDENT IN AN ORGANIZED HEALTH CARE EDUCATION/TRAINING PROGRAM

## 2021-06-04 PROCEDURE — A9270 NON-COVERED ITEM OR SERVICE: HCPCS | Performed by: FAMILY MEDICINE

## 2021-06-04 PROCEDURE — 700102 HCHG RX REV CODE 250 W/ 637 OVERRIDE(OP): Performed by: HOSPITALIST

## 2021-06-04 PROCEDURE — 83735 ASSAY OF MAGNESIUM: CPT

## 2021-06-04 PROCEDURE — 700111 HCHG RX REV CODE 636 W/ 250 OVERRIDE (IP): Performed by: INTERNAL MEDICINE

## 2021-06-04 PROCEDURE — 700102 HCHG RX REV CODE 250 W/ 637 OVERRIDE(OP): Performed by: NURSE PRACTITIONER

## 2021-06-04 PROCEDURE — 97535 SELF CARE MNGMENT TRAINING: CPT

## 2021-06-04 PROCEDURE — A9270 NON-COVERED ITEM OR SERVICE: HCPCS | Performed by: HOSPITALIST

## 2021-06-04 PROCEDURE — 700102 HCHG RX REV CODE 250 W/ 637 OVERRIDE(OP): Performed by: SURGERY

## 2021-06-04 PROCEDURE — A9270 NON-COVERED ITEM OR SERVICE: HCPCS | Performed by: INTERNAL MEDICINE

## 2021-06-04 PROCEDURE — A9270 NON-COVERED ITEM OR SERVICE: HCPCS | Performed by: SURGERY

## 2021-06-04 PROCEDURE — 80053 COMPREHEN METABOLIC PANEL: CPT

## 2021-06-04 PROCEDURE — 700102 HCHG RX REV CODE 250 W/ 637 OVERRIDE(OP): Performed by: STUDENT IN AN ORGANIZED HEALTH CARE EDUCATION/TRAINING PROGRAM

## 2021-06-04 PROCEDURE — 700102 HCHG RX REV CODE 250 W/ 637 OVERRIDE(OP): Performed by: FAMILY MEDICINE

## 2021-06-04 PROCEDURE — 99232 SBSQ HOSP IP/OBS MODERATE 35: CPT | Performed by: INTERNAL MEDICINE

## 2021-06-04 PROCEDURE — 700102 HCHG RX REV CODE 250 W/ 637 OVERRIDE(OP): Performed by: INTERNAL MEDICINE

## 2021-06-04 PROCEDURE — A9270 NON-COVERED ITEM OR SERVICE: HCPCS | Performed by: NURSE PRACTITIONER

## 2021-06-04 PROCEDURE — 36415 COLL VENOUS BLD VENIPUNCTURE: CPT

## 2021-06-04 PROCEDURE — 85025 COMPLETE CBC W/AUTO DIFF WBC: CPT

## 2021-06-04 PROCEDURE — 770001 HCHG ROOM/CARE - MED/SURG/GYN PRIV*

## 2021-06-04 PROCEDURE — 84100 ASSAY OF PHOSPHORUS: CPT

## 2021-06-04 PROCEDURE — 700111 HCHG RX REV CODE 636 W/ 250 OVERRIDE (IP): Performed by: HOSPITALIST

## 2021-06-04 PROCEDURE — 302146: Performed by: INTERNAL MEDICINE

## 2021-06-04 RX ORDER — POTASSIUM CHLORIDE 20 MEQ/1
40 TABLET, EXTENDED RELEASE ORAL ONCE
Status: COMPLETED | OUTPATIENT
Start: 2021-06-04 | End: 2021-06-04

## 2021-06-04 RX ORDER — MAGNESIUM SULFATE HEPTAHYDRATE 40 MG/ML
4 INJECTION, SOLUTION INTRAVENOUS ONCE
Status: COMPLETED | OUTPATIENT
Start: 2021-06-04 | End: 2021-06-05

## 2021-06-04 RX ORDER — VITAMIN B COMPLEX
1000 TABLET ORAL DAILY
Status: DISCONTINUED | OUTPATIENT
Start: 2021-06-04 | End: 2021-07-09 | Stop reason: HOSPADM

## 2021-06-04 RX ADMIN — HYDROCORTISONE 10 MG: 10 TABLET ORAL at 05:51

## 2021-06-04 RX ADMIN — APIXABAN 5 MG: 5 TABLET, FILM COATED ORAL at 05:51

## 2021-06-04 RX ADMIN — POTASSIUM CHLORIDE 40 MEQ: 1500 TABLET, EXTENDED RELEASE ORAL at 13:54

## 2021-06-04 RX ADMIN — ZIPRASIDONE HYDROCHLORIDE 60 MG: 60 CAPSULE ORAL at 08:36

## 2021-06-04 RX ADMIN — METOPROLOL SUCCINATE 12.5 MG: 25 TABLET, EXTENDED RELEASE ORAL at 05:51

## 2021-06-04 RX ADMIN — APIXABAN 5 MG: 5 TABLET, FILM COATED ORAL at 18:29

## 2021-06-04 RX ADMIN — LEVOTHYROXINE SODIUM 125 MCG: 0.12 TABLET ORAL at 05:51

## 2021-06-04 RX ADMIN — ONDANSETRON 4 MG: 4 TABLET, ORALLY DISINTEGRATING ORAL at 18:29

## 2021-06-04 RX ADMIN — FLUDROCORTISONE ACETATE 0.1 MG: 0.1 TABLET ORAL at 05:51

## 2021-06-04 RX ADMIN — OXYCODONE 10 MG: 5 TABLET ORAL at 18:29

## 2021-06-04 RX ADMIN — OXYCODONE 10 MG: 5 TABLET ORAL at 21:35

## 2021-06-04 RX ADMIN — METRONIDAZOLE 500 MG: 500 TABLET ORAL at 13:54

## 2021-06-04 RX ADMIN — ATORVASTATIN CALCIUM 40 MG: 40 TABLET, FILM COATED ORAL at 18:29

## 2021-06-04 RX ADMIN — OXYCODONE 10 MG: 5 TABLET ORAL at 05:50

## 2021-06-04 RX ADMIN — DIGOXIN 125 MCG: 125 TABLET ORAL at 18:29

## 2021-06-04 RX ADMIN — MAGNESIUM SULFATE IN WATER 4 G: 40 INJECTION, SOLUTION INTRAVENOUS at 21:28

## 2021-06-04 RX ADMIN — METRONIDAZOLE 500 MG: 500 TABLET ORAL at 21:34

## 2021-06-04 RX ADMIN — OXYCODONE 10 MG: 5 TABLET ORAL at 13:54

## 2021-06-04 RX ADMIN — HYDROCORTISONE 5 MG: 10 TABLET ORAL at 16:35

## 2021-06-04 RX ADMIN — ZIPRASIDONE HYDROCHLORIDE 60 MG: 60 CAPSULE ORAL at 21:34

## 2021-06-04 RX ADMIN — CLONAZEPAM 0.5 MG: 0.5 TABLET ORAL at 08:36

## 2021-06-04 RX ADMIN — POTASSIUM CHLORIDE 40 MEQ: 1500 TABLET, EXTENDED RELEASE ORAL at 05:51

## 2021-06-04 RX ADMIN — METRONIDAZOLE 500 MG: 500 TABLET ORAL at 05:51

## 2021-06-04 RX ADMIN — CLONAZEPAM 1 MG: 1 TABLET ORAL at 18:29

## 2021-06-04 RX ADMIN — PAROXETINE HYDROCHLORIDE 20 MG: 20 TABLET, FILM COATED ORAL at 08:36

## 2021-06-04 RX ADMIN — Medication 1000 UNITS: at 13:54

## 2021-06-04 RX ADMIN — OXYCODONE 10 MG: 5 TABLET ORAL at 09:56

## 2021-06-04 RX ADMIN — CIPROFLOXACIN HYDROCHLORIDE 500 MG: 500 TABLET, FILM COATED ORAL at 05:51

## 2021-06-04 ASSESSMENT — COGNITIVE AND FUNCTIONAL STATUS - GENERAL
HELP NEEDED FOR BATHING: A LOT
SUGGESTED CMS G CODE MODIFIER DAILY ACTIVITY: CK
DAILY ACTIVITIY SCORE: 15
TOILETING: TOTAL
DRESSING REGULAR UPPER BODY CLOTHING: A LITTLE
PERSONAL GROOMING: A LITTLE
DRESSING REGULAR LOWER BODY CLOTHING: A LOT

## 2021-06-04 ASSESSMENT — ENCOUNTER SYMPTOMS
FOCAL WEAKNESS: 0
MYALGIAS: 0
SPEECH CHANGE: 0
SORE THROAT: 0
VOMITING: 0
HEARTBURN: 0
WEAKNESS: 1
PALPITATIONS: 0
DIZZINESS: 0
FEVER: 0
BLURRED VISION: 0
NECK PAIN: 0
ABDOMINAL PAIN: 0
HEADACHES: 0
NERVOUS/ANXIOUS: 0
WHEEZING: 0
SENSORY CHANGE: 0
BACK PAIN: 0
HALLUCINATIONS: 0
DIAPHORESIS: 0
COUGH: 0
DEPRESSION: 1
FLANK PAIN: 0
SHORTNESS OF BREATH: 0
DIARRHEA: 0
CONSTIPATION: 0
NAUSEA: 0
CHILLS: 0

## 2021-06-04 ASSESSMENT — PAIN DESCRIPTION - PAIN TYPE
TYPE: ACUTE PAIN

## 2021-06-04 NOTE — PROGRESS NOTES
Bedside report received.  Assessment complete.  A&O x 4. Patient calls appropriately.  Patient agreable to Q2 turns and mobilizing as pain allows. Bed alarm on.   Patient has 8/10 pain. Pain managed with prescribed medications, distraction, and positioning.  Denies N&V. Tolerating full liquid diet.  Midline wound vac patent and draining serosanugous fluid.  + void, + flatus, + incontinent BM.   Patient denies SOB.  Review plan with of care with patient. Call light and personal belongings with in reach. Hourly rounding in place. All needs met at this time.

## 2021-06-04 NOTE — PROGRESS NOTES
"[facility]   TRIAGE ASSESSMENT     Name: Sebastián Castro  MRN: [id number]  : 1965  Age: 56 y.o.  Date of assessment: 6/3/2021  PCP: ELIZABETH Terrazas  Persons in attendance: Patient     CHIEF COMPLAINT/PRESENTING ISSUE (as stated by patient/record):        Chief Complaint   Patient presents with   • Syncope       X1 this morning when standing up to go to bathroom      Patient c/o distress and depression related to colostomy bag (\"I couldn't get used to it... couldn't wrap my brain around it.\"  Patient has had surgery to reverse bag.  Patient reported worsening SI beginning in 2020 when he lost his housing \"It upset my whole life.\"  He reports a fall in 2020 which evidenced a bowel obstruction and a colostomy bag was inserted.  Patient reports suicidal thoughts \"come and go,\" and, \"aren't that serious anymore.\"       CURRENT LIVING SITUATION/SOCIAL SUPPORT: Patient reported needing housing due to his long hospital admission.  This writer reached out to [facility] for [facility] services.  Additional information provided below.       BEHAVIORAL HEALTH TREATMENT HISTORY  Does patient/parent report a history of prior behavioral health treatment for patient?   Yes:    Dates Level of Care Facilty/Provider Diagnosis/Problem Medications     Outpatient primary care, psychiatry, case management and psychotherapy [facility] unk. unk.            SAFETY ASSESSMENT - SELF  Does patient acknowledge current or past symptoms of dangerousness to self? yes  Does parent/significant other report patient has current or past symptoms of dangerousness to self? N\A  Does presenting problem suggest symptoms of dangerousness to self? Patient experiences chronic suicidal ideation and current presentation reported to be his baseline.  Patient denied current thoughts, urges, intent, and/or plans to commit suicide.      SAFETY ASSESSMENT - OTHERS  Does patient acknowledge current or past symptoms of " "aggressive behavior or risk to others? no  Does parent/significant other report patient has current or past symptoms of aggressive behavior or risk to others?  no  Does presenting problem suggest symptoms of dangerousness to others? No     Crisis Safety Plan completed and copy given to patient? pending     ABUSE/NEGLECT SCREENING  Does patient report feeling “unsafe” in his/her home, or afraid of anyone?  no  Does patient report any history of physical, sexual, or emotional abuse?  no  Does parent or significant other report any of the above? N\A  Is there evidence of neglect by self?  no  Is there evidence of neglect by a caregiver? no  Does the patient/parent report any history of CPS/APS/police involvement related to suspected abuse/neglect or domestic violence? no  Based on the information provided during the current assessment, is a mandated report of suspected abuse/neglect being made?  No     SUBSTANCE USE SCREENING  Yes:  Zachary all substances used in the past 30 days:         Last Use Amount   []   Alcohol       []   Marijuana       []   Heroin       []   Prescription Opioids  (used without prescription, for    recreation, or in excess of prescribed amount)       []   Other Prescription  (used without prescription, for    recreation, or in excess of prescribed amount)       []   Cocaine       []   Methamphetamine       []   \"\" drugs (ectasy, MDMA)       []   Other substances                     Patient denies use/abuse of alcohol, illicit substances.       What consequences does the patient associate with any of the above substance use and or addictive behaviors? None     Risk factors for detox (check all that apply):  []   Seizures   []   Diaphoretic (sweating)   []   Tremors   []   Hallucinations   []   Increased blood pressure   []   Decreased blood pressure   []   Other   []   None       [] Patient education on risk factors for detoxification and instructed to return to ER as needed.        MENTAL " STATUS              Participation: Active verbal participation  Grooming: Adequate  Orientation: Fully Oriented  Behavior: Calm  Eye contact: Indirect  Mood: Euthymic  Affect: Flexible  Thought process: Goal-directed  Thought content: Within normal limits  Speech: Rate within normal limits and Volume within normal limits  Perception: Within normal limits  Memory:  No gross evidence of memory deficits  Insight: Limited  Judgment:  Adequate  Other:     Patient endorsed command auditory and visual hallucinations with onset of November 2020.  Patient denied recent experience of AH/VH and presentation was not consistent with experience of internal stimuli.       Collateral information:   Source:  [] Significant other present in person:   [] Significant other by telephone  [] [facility]   [] [facility] Nursing Staff  [] [facility]  [] Other:      [] Unable to complete full assessment due to:  [] Acute intoxication  [] Patient declined to participate/engage  [] Patient verbally unresponsive  [] Significant cognitive deficits  [] Significant perceptual distortions or behavioral disorganization  [] Other:              CLINICAL IMPRESSIONS:  Primary:  MDD, recurrent, moderate, with psychotic features  Secondary:  Rule out [employer]                               IDENTIFIED NEEDS/PLAN:  [Trigger DISPOSITION list for any items marked]     []  Imminent safety risk - self []  Imminent safety risk - others   []  Acute substance withdrawal [x]  Psychosis/Impaired reality testing   [x]  Mood/anxiety []  Substance use/Addictive behavior   [x]  Maladaptive behaviro []  Parent/child conflict   []  Family/Couples conflict []  Biomedical   [x]  Housing []  Financial   []   Legal   Occupational/Educational   []  Domestic violence []  Other:      Recommended Plan of Care:  Refer to [facility] for outpatient wraparound services.  Spoke to  supervisor Tejal Elliott about IOP, housing and return to wrap around  services.     1. Discontinue legal hold  2.  to facilitate IOP, housing, and wrap around care through [facility].  Patient is established with this provider.    3. Recommend APRN from [facility] be contacted to do assessment of patient at [facility] for appropriateness for housing.   4. Requested records need to be sent to [facility]   5. Recommend appointment for IOP assessment at [facility] be secured prior to patient discharge     Does patient express agreement with the above plan? no     Referral appointment(s) scheduled? no     I have discussed findings and recommendations with Dr. Berger who is in agreement with these recommendations.      Referral information sent to the following community providers : N/A.  Writer had telephone contact with Tejal Elliott ( supervisor with [facility]).  Tejal to forward list of records needed for housing and IOP assessments.       If applicable : Referred  to  Caprice Jorge for legal hold follow up at (time): 17:30 hours.        Joann Lloyd L.C.S.W.  6/3/2021

## 2021-06-04 NOTE — DISCHARGE PLANNING
Per LCSW Joann Lloyd legal hold has been discontinued.   Removed pt from legal hold as of 6/3 at 1733.

## 2021-06-04 NOTE — CARE PLAN
Problem: Depression  Goal: Patient and family/caregiver will verbalize accurate information about at least two of the possible causes of depression, three-four of the signs and symptoms of depression  Outcome: Progressing     Problem: Bowel Elimination  Goal: Establish and maintain regular bowel function  Outcome: Progressing     Problem: Pain - Standard  Goal: Alleviation of pain or a reduction in pain to the patient’s comfort goal  Outcome: Progressing   The patient is Stable - Low risk of patient condition declining or worsening    Shift Goals  Clinical Goals: Mobilize safely and mantain skin integrity  Patient Goals: Rest, Pain management  Family Goals: n/a    Progress made toward(s) clinical / shift goals:  patient participating in Q2 turns     Patient is not progressing towards the following goals:

## 2021-06-04 NOTE — DISCHARGE PLANNING
Notice of withdrawal filed with the court via [employer], scanned copy of verified notice into pt's chart, sent copy to ELISA Haddad.

## 2021-06-04 NOTE — PROGRESS NOTES
2 RN skin check completed.   Right lateroposterior trunk has small open blistering and moisture associated irritation  Right side of abdomen open blisters, scabbing from prior abdominal dressing tape  Old ostomy site of LLQ  Midline woundvac. Erythema noted around dressing  Moisture associated erythema to bilateral groin skin flaps and under the panus.    Blanchable red/purple skin on sacrum.   Bilateral healing scabs on legs  Scattered ecchymosis to bilateral legs  Bilateral shins and feet have significant flakiness and callusing  Midline IV to right upper arm, CDI.     Q2 turns in place, waffle overlay on mattress, prophylactic mepilex to heels and elbows, barrier cream applied to areas with moisture associated irritation, and regular incontinence checks are in place. Patient is receiving adequate nutrition and is mobilizing as pain will allow.

## 2021-06-04 NOTE — CARE PLAN
Problem: Provide Safe Environment  Goal: Suicide environmental safety, protocols, policies, and practices will be implemented  Outcome: Progressing     Problem: Psychosocial  Goal: Patient's ability to identify and develop effective coping behaviors will improve  Outcome: Progressing    Patient actively taking part in multidisciplinary care     The patient is Stable - Low risk of patient condition declining or worsening    Shift Goals  Clinical Goals: (P) Up to chair for meals  Patient Goals: (P) Comfort with movement  Family Goals: (P) N/A    Progress made toward(s) clinical / shift goals:    Patient working on strengthening exercises and mobility in order to achieve clinical/shift goals.     Patient is not progressing towards the following goals:  Continuing to report pain during movement.

## 2021-06-04 NOTE — PROGRESS NOTES
Bedside report received.  Assessment complete.  A&O x 4. Patient calls appropriately.  Patient up with x2 assist. Bed alarm on. Tele sitter in place.   Patient has 0/10 pain.  Denies N&V. Tolerating diet.  Wound vac to MLI.  Review plan with of care with patient. Call light and personal belongings with in reach. Hourly rounding in place. All needs met at this time.

## 2021-06-04 NOTE — CARE PLAN
The patient is Stable - Low risk of patient condition declining or worsening    Shift Goals  Clinical Goals: sleep  Patient Goals: Rest, Pain management  Family Goals: n/a    Progress made toward(s) clinical / shift goals:  No complaints of pain and patient slept comfortably throughout shift.    Problem: Pain - Standard  Goal: Alleviation of pain or a reduction in pain to the patient’s comfort goal  Outcome: Progressing       Patient is not progressing towards the following goals:

## 2021-06-04 NOTE — PROGRESS NOTES
[facility] Medicine Daily Progress Note    Date of Service  6/4/2021    Chief Complaint  56 y.o. male admitted 2/9/2021 with Syncope.    Hospital Course  Admitted with syncope, he was noted to be bradycardic and hypotensive.  He has known history of permanent atrial fibrillation and he was on diltiazem, digoxin and metoprolol.  These medications were held.  Later on he was restarted on low-dose Toprol and Digoxin.  He was also started on anticoagulation with Eliquis.  He also had a possible history of adrenal insufficiency, he was already on Florinef and midodrine.  Midodrine was discontinued and he was started on cortisol. Further work-up showed that he had gram-negative bacteremia as well as urinary tract infection and acute kidney injury.  He was initially placed on IV Zosyn, later changed to oral Bactrim to finish the course.  He was carefully hydrated with IV fluids.  On admission he also expressed suicidal ideation.  Psychiatry was consulted the case, he was placed on a legal hold.  He was noted to have severe depression.  He has been placed on medication the form of Geodon, Paxil, Klonopin.  He has a colostomy in place, which the patient believes contributes to his severe depression because of ostomy care.  He had history of bowel perforation and splenic fluid collection requiring Segmental colectomy, colostomy creation, mobilization of splenic flexure, wound VAC placement, irrigation and debridement of flank wound on 11/10/2020.  Surgery was consulted on the case, and he underwent exploratory laparotomy and colostomy takedown and closure on 5/27/2021.  6/1- restarted antibiotic per surgery team. WBC stable since started on antibiotic per surgery.     Interval Problem Update  Colostomy - pain mostly controlled  Suicidal - less thoughts  Afib - rate 100-120  Diabetes -   SRUTHI - crea trended down to 0.8  Low potassium and magnesium    6/1- pt denies pain, or other concerns. WBC increasing. Antibiotic was  started per surgery team who is concern for infection. CT abdomen ?? Still pending legal hold eval   6/2- pt continues to have tenderness on his surgical site. WBC improving on antibiotic. No need for CT scan. Legal hold pending. Wound vac per surgery.   6/3- wound vac placed today. He complains of some abdominal soreness. No other event. Cannot be transfer at behavior institute with wound vac. Continue care inpatient. WBC stable. Still sometimes tachycardic. Continue to  Monitor. Discussed with surgery, eliquis restarted   6/4- feeling well. No abdominal pain. Wound vac in place. Legal hold discontinued     Consultants/Specialty  Cardiology  Psychiatry  Surgery    Code Status  Full Code    Disposition  Legal Hold  SNF vs Inpatient psychiatry vs Group home    Review of Systems  Review of Systems   Constitutional: Positive for malaise/fatigue. Negative for chills, diaphoresis and fever.   HENT: Negative for congestion, hearing loss and sore throat.    Eyes: Negative for blurred vision.   Respiratory: Negative for cough, shortness of breath and wheezing.    Cardiovascular: Negative for chest pain, palpitations and leg swelling.   Gastrointestinal: Negative for abdominal pain, constipation, diarrhea, heartburn, nausea and vomiting.   Genitourinary: Negative for dysuria, flank pain and hematuria.   Musculoskeletal: Negative for back pain, joint pain, myalgias and neck pain.   Skin: Negative for rash.   Neurological: Positive for weakness. Negative for dizziness, sensory change, speech change, focal weakness and headaches.   Psychiatric/Behavioral: Positive for depression. Negative for hallucinations and suicidal ideas. The patient is not nervous/anxious.       Physical Exam  Temp:  [36.1 °C (97 °F)-36.9 °C (98.5 °F)] 36.9 °C (98.5 °F)  Pulse:  [] 95  Resp:  [16-18] 18  BP: (117-131)/(77-86) 117/77  SpO2:  [90 %-94 %] 90 %    Physical Exam  Vitals and nursing note reviewed.   Constitutional:       Appearance: He is  obese.   HENT:      Head: Normocephalic and atraumatic.      Nose: No congestion.      Mouth/Throat:      Mouth: Mucous membranes are moist.   Eyes:      Extraocular Movements: Extraocular movements intact.      Conjunctiva/sclera: Conjunctivae normal.      Pupils: Pupils are equal, round, and reactive to light.   Cardiovascular:      Rate and Rhythm: Normal rate. Rhythm irregularly irregular.   Pulmonary:      Effort: Pulmonary effort is normal.      Breath sounds: Normal breath sounds.   Abdominal:      General: There is no distension.      Tenderness: There is no abdominal tenderness. There is no guarding or rebound.      Comments: Wound vac in place    Musculoskeletal:      Cervical back: No muscular tenderness.      Right lower leg: Edema present.      Left lower leg: Edema present.   Skin:     General: Skin is warm and dry.   Neurological:      General: No focal deficit present.      Mental Status: He is alert and oriented to person, place, and time.      Cranial Nerves: No cranial nerve deficit.   Psychiatric:         Mood and Affect: Mood is not anxious.         Speech: Speech normal.         Thought Content: Thought content is not paranoid or delusional. Thought content includes suicidal ideation. Thought content does not include homicidal ideation. Thought content does not include homicidal or suicidal plan.         Judgment: Judgment is not impulsive.       Fluids    Intake/Output Summary (Last 24 hours) at 6/4/2021 1300  Last data filed at 6/4/2021 1000  Gross per 24 hour   Intake 1040 ml   Output 525 ml   Net 515 ml       Laboratory  Recent Labs     06/02/21 0618 06/03/21  0359 06/04/21  0947   WBC 11.6* 11.1* 12.1*   RBC 4.31* 4.42* 4.77   HEMOGLOBIN 12.5* 12.7* 13.7*   HEMATOCRIT 37.4* 39.6* 43.3   MCV 86.8 89.6 90.8   MCH 29.0 28.7 28.7   MCHC 33.4* 32.1* 31.6*   RDW 47.6 48.9 50.0   PLATELETCT 231 237 262   MPV 10.2 10.2 10.5     Recent Labs     06/02/21 0618 06/03/21  0359 06/04/21  0947   SODIUM  138 139 140   POTASSIUM 3.2* 3.2* 3.4*   CHLORIDE 103 104 105   CO2 26 26 25   GLUCOSE 94 116* 111*   BUN 20 15 13   CREATININE 0.82 0.74 0.91   CALCIUM 8.6 8.2* 8.4*                   Imaging  IR-MIDLINE CATHETER INSERTION WO GUIDANCE > AGE 3   Final Result                  Ultrasound-guided midline placement performed by qualified nursing staff    as above.          EC-ECHOCARDIOGRAM COMPLETE W/ CONT   Final Result      DX-CHEST-PORTABLE (1 VIEW)   Final Result      1.  Minor asymmetric interstitial opacity in the right lower lobe which could represent minor interstitial edema, pneumonia, or fibrotic change.      2.  No left lung consolidation.      3.  No evidence of congestive failure.      EG-EUHTZNR-8 VIEW   Final Result         No specific finding to suggest small bowel obstruction.      DA-TBNNUNS-9 VIEW   Final Result         No significant interval change.      DX-CHEST-LIMITED (1 VIEW)   Final Result         Diffuse interstitial prominence could relate to mild pulmonary edema or atypical infection      PH-VRDNRVS-3 VIEW   Final Result      Increased colonic gas without definite bowel obstruction.      US-RENAL   Final Result      No evidence of hydronephrosis.      Lobulated kidneys bilaterally.      CT-ABDOMEN-PELVIS WITH   Final Result      1.  There is no evidence of small bowel obstruction.   2.  There is a left lower quadrant ostomy.   3.  There is fluid distention of the colon distal to the surgical material in the left mid descending colon extending all the way to the rectum. There is no pneumatosis or free air.   4.  There is cholelithiasis without biliary dilatation.   5.  There has been interval removal of the drainage catheter anterior to the spleen with minimal hypodense area in the anterior spleen again noted. There is no new fluid collection.      IR-US GUIDED PIV   Final Result    Ultrasound-guided PERIPHERAL IV INSERTION performed by    qualified nursing staff as above.       EC-ECHOCARDIOGRAM COMPLETE W/O CONT   Final Result      DX-LUMBAR SPINE-2 OR 3 VIEWS   Final Result      Moderate compression deformity of T11 is new compared to 2018.      Mild wedge deformity of T12 is unchanged.      Degenerative changes including facet arthropathy.      Mild retrolisthesis of L5 on S1 and L3 on L4.              Assessment/Plan  * Syncope- (present on admission)  Assessment & Plan  secondary to Hypotension and Bradycardia    UTI (urinary tract infection)- (present on admission)  Assessment & Plan  Completed 10-day course of Bactrim    Leukocytosis- (present on admission)  Assessment & Plan  Follow cbc  6/4- still persistent. Continue to monitor     Obstructive uropathy- (present on admission)  Assessment & Plan  Bladder scan and straight cath prn    Gram-negative bacteremia- (present on admission)  Assessment & Plan  Completed 10-day course of Bactrim    Permanent atrial fibrillation (HCC)- (present on admission)  Assessment & Plan  Toprol, resume Digoxin  6/3- discussed with surgery. eliquis resumed     Chronic venous insufficiency of lower extremity- (present on admission)  Assessment & Plan  compression stockings.    Chronic heart failure with preserved ejection fraction (HCC)- (present on admission)  Assessment & Plan  Toprol  follow probnp    Type 2 diabetes mellitus with hyperglycemia, with long-term current use of insulin (HCC)- (present on admission)  Assessment & Plan  Follow bmp  Currently not requiring Insulin    Major depressive disorder, recurrent, severe with psychotic features (HCC)- (present on admission)  Assessment & Plan  Geodon, Paxil, Klonopin    Colostomy present (HCC)- (present on admission)  Assessment & Plan  Exploratory laparotomy with colostomy takedown and closure 5/27/2021  Segmental colectomy, colostomy creation, mobilization of splenic flexure, wound VAC placement, irrigation and debridement of flank wound on 11/10/2020.  Colostomy reversed 5/27  Pain  control  Encourage I-S  6/1- abdominal wound pictures reviewed. Review recs from surgery. Started on antibiotic. Continue to monitor   6/2- discuss with surgery. Continue oral antibiotic. WBC improving. Wound vac per surgery.   6/3- wound vac placed. Continue wound care, antibiotic per surgery recs. Continue to monitor       Hypothyroidism- (present on admission)  Assessment & Plan  Increased Levothyroxine to 125 mcg  Check TSH on 6/29/2021    SRUTHI (acute kidney injury) (HCC)- (present on admission)  Assessment & Plan  6/1- resolved     Adrenal insufficiency (HCC)- (present on admission)  Assessment & Plan  Possible?  Cortef    Debility- (present on admission)  Assessment & Plan  PT and OT   vit d level low 5/29- start replacement     Suicidal ideation- (present on admission)  Assessment & Plan  6/4- legal hold discontinued.   Possible SNF placement     Orthostatic hypotension- (present on admission)  Assessment & Plan  Florinef    Mixed hyperlipidemia- (present on admission)  Assessment & Plan  Atorvastatin.     Lower limb amputation, great toe (HCC)- (present on admission)  Assessment & Plan  monitor    Hypomagnesemia- (present on admission)  Assessment & Plan  6/3-IV Mg 2 g  6/4- mg 1.3-replace 4 mg IV   Follow level    Class 2 severe obesity with serious comorbidity and body mass index (BMI) of 35.0 to 35.9 in adult (HCC)- (present on admission)  Assessment & Plan  Body mass index is 35.19 kg/m².    Hypokalemia- (present on admission)  Assessment & Plan  Start Kdur  follow bmp  6/1- replace. Continue to monitor   6/3- replaced. magnesium too. Continue to monitor     Hyponatremia- (present on admission)  Assessment & Plan  6/1- resolved        VTE prophylaxis: Lovenox

## 2021-06-04 NOTE — DISCHARGE PLANNING
This RNCM received faxed Notice of Withdrawal filed with court from KALIA Vasques. ANDREW Paredes and BSRBENI Sheth notified of Notice via [employer].

## 2021-06-04 NOTE — THERAPY
"Occupational Therapy  Daily Treatment     Patient Name: Sebastián Castro  Age:  56 y.o., Sex:  male  Medical Record #: [id number]  Today's Date: 6/4/2021     Precautions  Precautions: (P) Fall Risk  Comments: (P) abd wound vac    Assessment    Pt seen for OT tx session. Pt primarily limited by pain and fatigue. Pt required min A for supine>sit. At EOB required max A to don socks and SPV for seated grooming. Pt stood w/ min A for extended time as pt was incont of stool. Pt demo'd impaired balance, functional mobility, and activity tolerance impacting functional independence. Will continue to follow.     Plan    Continue current treatment plan.    DC Equipment Recommendations: (P) Unable to determine at this time  Discharge Recommendations: (P) Recommend post-acute placement for additional occupational therapy services prior to discharge home    Subjective    \"I'm really tired and dizzy, I need to sit down\"     Objective       06/04/21 1213   Total Time Spent   Total Time Spent (Mins) 34   Treatment Charges   Charges Yes   OT Self Care / ADL 2   Precautions   Precautions Fall Risk   Comments abd wound vac   Vitals   O2 Delivery Device None - Room Air   Pain 0 - 10 Group   Therapist Pain Assessment Nurse Notified;Post Activity Pain Same as Prior to Activity  (c/o abd pain)   Cognition    Cognition / Consciousness X   Level of Consciousness Alert   New Learning Impaired   Attention Impaired   Comments pleasent, cooperative, flat affect   Balance   Sitting Balance (Static) Fair   Sitting Balance (Dynamic) Fair   Standing Balance (Static) Poor +   Standing Balance (Dynamic) Poor +   Weight Shift Sitting Fair   Weight Shift Standing Poor   Skilled Intervention Tactile Cuing;Verbal Cuing;Facilitation   Comments w/ 4WW   Bed Mobility    Supine to Sit Minimal Assist   Sit to Supine Moderate Assist   Scooting Supervised  (w/ increased time)   Skilled Intervention Verbal Cuing;Tactile Cuing;Facilitation   Activities of Daily Living "   Grooming Supervision;Seated  (unable to attempt in standing due to incont of stool)   Upper Body Dressing Minimal Assist   Lower Body Dressing Maximal Assist   Toileting Total Assist  (incont of stook)   Skilled Intervention Tactile Cuing;Verbal Cuing   How much help from another person does the patient currently need...   Putting on and taking off regular lower body clothing? 2   Bathing (including washing, rinsing, and drying)? 2   Toileting, which includes using a toilet, bedpan, or urinal? 1   Putting on and taking off regular upper body clothing? 3   Taking care of personal grooming such as brushing teeth? 3   Eating meals? 4   6 Clicks Daily Activity Score 15   Functional Mobility   Sit to Stand Minimal Assist   Bed, Chair, Wheelchair Transfer Minimal Assist   Toilet Transfers Refused   Mobility 3 steps forwards and backwards   Skilled Intervention Tactile Cuing;Verbal Cuing   Comments limited mobility due to incont of stool    Activity Tolerance   Sitting in Chair NT   Sitting Edge of Bed 20 min   Standing 6 min total   Patient / Family Goals   Patient / Family Goal #1 To feel better   Goal #1 Outcome Goal not met   Short Term Goals   Short Term Goal # 1 Pt will perform LB dressing w/ min A   Goal Outcome # 1 Progressing as expected   Short Term Goal # 2 Pt will perform functional t/f's with supervision   Goal Outcome # 2 Progressing as expected   Short Term Goal # 3 Pt will perform h/g standing sink side with supervision   Goal Outcome # 3 Progressing slower than expected   Education Group   Role of Occupational Therapist Patient Response Patient;Acceptance;Explanation;Verbal Demonstration   ADL Patient Response Patient;Acceptance;Explanation;Action Demonstration;Verbal Demonstration   Anticipated Discharge Equipment and Recommendations   DC Equipment Recommendations Unable to determine at this time   Discharge Recommendations Recommend post-acute placement for additional occupational therapy services  prior to discharge home   Interdisciplinary Plan of Care Collaboration   IDT Collaboration with  Nursing   Patient Position at End of Therapy Call Light within Reach;Tray Table within Reach;Phone within Reach;In Bed;Bed Alarm On  (RN at bedside, telesitter in place)   Collaboration Comments report given   Session Information   Date / Session Number  6/4, 3 (1/3, 6/10)   Priority 2

## 2021-06-04 NOTE — BH THERAPY
"[facility]   TRIAGE ASSESSMENT    Name: Sebastián Castro  MRN: [id number]  : 1965  Age: 56 y.o.  Date of assessment: 6/3/2021  PCP: ELIZABETH Terrazas  Persons in attendance: Patient    CHIEF COMPLAINT/PRESENTING ISSUE (as stated by patient/record):   Chief Complaint   Patient presents with   • Syncope     X1 this morning when standing up to go to bathroom      Patient c/o distress and depression related to colostomy bag (\"I couldn't get used to it... couldn't wrap my brain around it.\"  Patient has had surgery to reverse bag.  Patient reported worsening SI beginning in 2020 when he lost his housing \"It upset my whole life.\"  He reports a fall in 2020 which evidenced a bowel obstruction and a colostomy bag was inserted.  Patient reports suicidal thoughts \"come and go,\" and, \"aren't that serious anymore.\"      CURRENT LIVING SITUATION/SOCIAL SUPPORT: Patient reported needing housing due to his long hospital admission.  This writer reached out to [facility] services.  Additional information provided below.      BEHAVIORAL HEALTH TREATMENT HISTORY  Does patient/parent report a history of prior behavioral health treatment for patient?   Yes:    Dates Level of Care Facilty/Provider Diagnosis/Problem Medications    Outpatient primary care, psychiatry, case management and psychotherapy [facility] unk. unk.         SAFETY ASSESSMENT - SELF  Does patient acknowledge current or past symptoms of dangerousness to self? yes  Does parent/significant other report patient has current or past symptoms of dangerousness to self? N\A  Does presenting problem suggest symptoms of dangerousness to self? Patient experiences chronic suicidal ideation and current presentation reported to be his baseline.  Patient denied current thoughts, urges, intent, and/or plans to commit suicide.     SAFETY ASSESSMENT - OTHERS  Does patient acknowledge current or past symptoms of aggressive " [facility]  Progress Note    Carl Vasquez Patient Status:  Inpatient    1968 MRN [id number]   [facility] 4NW-A Attending Patricia Varela MD   Hosp Day # 13 PCP Tiffany Regalado MD         SUBJECTIVE:  Subjective:  Fabby Brantley is a "behavior or risk to others? no  Does parent/significant other report patient has current or past symptoms of aggressive behavior or risk to others?  no  Does presenting problem suggest symptoms of dangerousness to others? No    Crisis Safety Plan completed and copy given to patient? pending    ABUSE/NEGLECT SCREENING  Does patient report feeling “unsafe” in his/her home, or afraid of anyone?  no  Does patient report any history of physical, sexual, or emotional abuse?  no  Does parent or significant other report any of the above? N\A  Is there evidence of neglect by self?  no  Is there evidence of neglect by a caregiver? no  Does the patient/parent report any history of CPS/APS/police involvement related to suspected abuse/neglect or domestic violence? no  Based on the information provided during the current assessment, is a mandated report of suspected abuse/neglect being made?  No    SUBSTANCE USE SCREENING  Yes:  Zachary all substances used in the past 30 days:      Last Use Amount   []   Alcohol     []   Marijuana     []   Heroin     []   Prescription Opioids  (used without prescription, for    recreation, or in excess of prescribed amount)     []   Other Prescription  (used without prescription, for    recreation, or in excess of prescribed amount)     []   Cocaine      []   Methamphetamine     []   \"\" drugs (ectasy, MDMA)     []   Other substances        Patient denies use/abuse of alcohol, illicit substances.      What consequences does the patient associate with any of the above substance use and or addictive behaviors? None    Risk factors for detox (check all that apply):  []  Seizures   []  Diaphoretic (sweating)   []  Tremors   []  Hallucinations   []  Increased blood pressure   []  Decreased blood pressure   []  Other   []  None      [] Patient education on risk factors for detoxification and instructed to return to ER as needed.      MENTAL STATUS   Participation: Active verbal " Nutritionist to follow.     6.       Hypertension. Nigel Larson.  Monitor blood pressure.     7.       History of bilateral massive cerebrovascular accident with post CVA severe complications.  Plan:  Continue statin.  Neuro eval and opinion no participation  Grooming: Adequate  Orientation: Fully Oriented  Behavior: Calm  Eye contact: Indirect  Mood: Euthymic  Affect: Flexible  Thought process: Goal-directed  Thought content: Within normal limits  Speech: Rate within normal limits and Volume within normal limits  Perception: Within normal limits  Memory:  No gross evidence of memory deficits  Insight: Limited  Judgment:  Adequate  Other:    Patient endorsed command auditory and visual hallucinations with onset of November 2020.  Patient denied recent experience of AH/VH and presentation was not consistent with experience of internal stimuli.      Collateral information:   Source:  [] Significant other present in person:   [] Significant other by telephone  [] [facility]   [] [facility] Nursing Staff  [] [facility]  [] Other:     [] Unable to complete full assessment due to:  [] Acute intoxication  [] Patient declined to participate/engage  [] Patient verbally unresponsive  [] Significant cognitive deficits  [] Significant perceptual distortions or behavioral disorganization  [] Other:      CLINICAL IMPRESSIONS:  Primary:  MDD, recurrent, moderate, with psychotic features  Secondary:  Rule out [employer]       IDENTIFIED NEEDS/PLAN:  [Trigger DISPOSITION list for any items marked]    []  Imminent safety risk - self [] Imminent safety risk - others   []  Acute substance withdrawal [x]  Psychosis/Impaired reality testing   [x]  Mood/anxiety []  Substance use/Addictive behavior   [x]  Maladaptive behaviro []  Parent/child conflict   []  Family/Couples conflict []  Biomedical   [x]  Housing []  Financial   []   Legal  Occupational/Educational   []  Domestic violence []  Other:     Recommended Plan of Care:  Refer to [facility] for outpatient wraparound services.  Spoke to CM supervisor Tejal Elliott about IOP, housing and return to wrap around services.     1. Discontinue legal hold  2.  to facilitate IOP, housing, and  wrap around care through [facility].  Patient is established with this provider.    3. Recommend APRN from [facility] be contacted to do assessment of patient at [facility] for appropriateness for housing.   4. Requested records need to be sent to [facility]   5. Recommend appointment for IOP assessment at [facility] be secured prior to patient discharge    Does patient express agreement with the above plan? no    Referral appointment(s) scheduled? no    I have discussed findings and recommendations with Dr. Berger who is in agreement with these recommendations.     Referral information sent to the following community providers : N/A.  Writer had telephone contact with Tejal Elliott ( supervisor with [facility]).  Tejal to forward list of records needed for housing and IOP assessments.      If applicable : Referred  to  Caprice Jorge for legal hold follow up at (time): 17:30 hours.       Joann Lloyd L.C.S.W.  6/3/2021

## 2021-06-05 PROBLEM — N17.9 AKI (ACUTE KIDNEY INJURY) (HCC): Status: RESOLVED | Noted: 2020-11-11 | Resolved: 2021-06-05

## 2021-06-05 PROBLEM — I96 GANGRENE OF TOE OF RIGHT FOOT (HCC): Status: RESOLVED | Noted: 2017-11-11 | Resolved: 2021-06-05

## 2021-06-05 PROBLEM — N39.0 UTI (URINARY TRACT INFECTION): Status: RESOLVED | Noted: 2021-05-28 | Resolved: 2021-06-05

## 2021-06-05 PROBLEM — E87.1 HYPONATREMIA: Status: RESOLVED | Noted: 2017-11-11 | Resolved: 2021-06-05

## 2021-06-05 PROBLEM — R78.81 GRAM-NEGATIVE BACTEREMIA: Status: RESOLVED | Noted: 2021-03-13 | Resolved: 2021-06-05

## 2021-06-05 PROBLEM — L08.9 DIABETIC INFECTION OF RIGHT FOOT (HCC): Status: RESOLVED | Noted: 2018-02-02 | Resolved: 2021-06-05

## 2021-06-05 PROBLEM — E11.628 DIABETIC INFECTION OF RIGHT FOOT (HCC): Status: RESOLVED | Noted: 2018-02-02 | Resolved: 2021-06-05

## 2021-06-05 PROBLEM — R55 SYNCOPE: Status: RESOLVED | Noted: 2021-02-09 | Resolved: 2021-06-05

## 2021-06-05 LAB
ALBUMIN SERPL BCP-MCNC: 2.8 G/DL (ref 3.2–4.9)
ALBUMIN/GLOB SERPL: 0.8 G/DL
ALP SERPL-CCNC: 99 U/L (ref 30–99)
ALT SERPL-CCNC: 14 U/L (ref 2–50)
ANION GAP SERPL CALC-SCNC: 12 MMOL/L (ref 7–16)
AST SERPL-CCNC: 18 U/L (ref 12–45)
BASOPHILS # BLD AUTO: 0.5 % (ref 0–1.8)
BASOPHILS # BLD: 0.08 K/UL (ref 0–0.12)
BILIRUB SERPL-MCNC: 0.5 MG/DL (ref 0.1–1.5)
BUN SERPL-MCNC: 14 MG/DL (ref 8–22)
CALCIUM SERPL-MCNC: 8.6 MG/DL (ref 8.5–10.5)
CHLORIDE SERPL-SCNC: 104 MMOL/L (ref 96–112)
CO2 SERPL-SCNC: 25 MMOL/L (ref 20–33)
CREAT SERPL-MCNC: 1.14 MG/DL (ref 0.5–1.4)
EKG IMPRESSION: NORMAL
EOSINOPHIL # BLD AUTO: 0.46 K/UL (ref 0–0.51)
EOSINOPHIL NFR BLD: 2.6 % (ref 0–6.9)
ERYTHROCYTE [DISTWIDTH] IN BLOOD BY AUTOMATED COUNT: 49.3 FL (ref 35.9–50)
GLOBULIN SER CALC-MCNC: 3.5 G/DL (ref 1.9–3.5)
GLUCOSE SERPL-MCNC: 158 MG/DL (ref 65–99)
HCT VFR BLD AUTO: 46.9 % (ref 42–52)
HGB BLD-MCNC: 15 G/DL (ref 14–18)
IMM GRANULOCYTES # BLD AUTO: 0.14 K/UL (ref 0–0.11)
IMM GRANULOCYTES NFR BLD AUTO: 0.8 % (ref 0–0.9)
LYMPHOCYTES # BLD AUTO: 2.52 K/UL (ref 1–4.8)
LYMPHOCYTES NFR BLD: 14.5 % (ref 22–41)
MAGNESIUM SERPL-MCNC: 2.3 MG/DL (ref 1.5–2.5)
MCH RBC QN AUTO: 28.7 PG (ref 27–33)
MCHC RBC AUTO-ENTMCNC: 32 G/DL (ref 33.7–35.3)
MCV RBC AUTO: 89.8 FL (ref 81.4–97.8)
MONOCYTES # BLD AUTO: 1.26 K/UL (ref 0–0.85)
MONOCYTES NFR BLD AUTO: 7.2 % (ref 0–13.4)
NEUTROPHILS # BLD AUTO: 12.96 K/UL (ref 1.82–7.42)
NEUTROPHILS NFR BLD: 74.4 % (ref 44–72)
NRBC # BLD AUTO: 0 K/UL
NRBC BLD-RTO: 0 /100 WBC
PLATELET # BLD AUTO: 377 K/UL (ref 164–446)
PMV BLD AUTO: 10.2 FL (ref 9–12.9)
POTASSIUM SERPL-SCNC: 3.5 MMOL/L (ref 3.6–5.5)
PROT SERPL-MCNC: 6.3 G/DL (ref 6–8.2)
RBC # BLD AUTO: 5.22 M/UL (ref 4.7–6.1)
SODIUM SERPL-SCNC: 141 MMOL/L (ref 135–145)
WBC # BLD AUTO: 17.4 K/UL (ref 4.8–10.8)

## 2021-06-05 PROCEDURE — 700101 HCHG RX REV CODE 250: Performed by: SURGERY

## 2021-06-05 PROCEDURE — A9270 NON-COVERED ITEM OR SERVICE: HCPCS | Performed by: FAMILY MEDICINE

## 2021-06-05 PROCEDURE — A9270 NON-COVERED ITEM OR SERVICE: HCPCS | Performed by: INTERNAL MEDICINE

## 2021-06-05 PROCEDURE — 700111 HCHG RX REV CODE 636 W/ 250 OVERRIDE (IP): Performed by: INTERNAL MEDICINE

## 2021-06-05 PROCEDURE — 700102 HCHG RX REV CODE 250 W/ 637 OVERRIDE(OP): Performed by: STUDENT IN AN ORGANIZED HEALTH CARE EDUCATION/TRAINING PROGRAM

## 2021-06-05 PROCEDURE — A9270 NON-COVERED ITEM OR SERVICE: HCPCS | Performed by: SURGERY

## 2021-06-05 PROCEDURE — 700102 HCHG RX REV CODE 250 W/ 637 OVERRIDE(OP): Performed by: NURSE PRACTITIONER

## 2021-06-05 PROCEDURE — A9270 NON-COVERED ITEM OR SERVICE: HCPCS | Performed by: NURSE PRACTITIONER

## 2021-06-05 PROCEDURE — 700102 HCHG RX REV CODE 250 W/ 637 OVERRIDE(OP): Performed by: HOSPITALIST

## 2021-06-05 PROCEDURE — 700111 HCHG RX REV CODE 636 W/ 250 OVERRIDE (IP): Performed by: SURGERY

## 2021-06-05 PROCEDURE — A9270 NON-COVERED ITEM OR SERVICE: HCPCS | Performed by: STUDENT IN AN ORGANIZED HEALTH CARE EDUCATION/TRAINING PROGRAM

## 2021-06-05 PROCEDURE — 700102 HCHG RX REV CODE 250 W/ 637 OVERRIDE(OP): Performed by: INTERNAL MEDICINE

## 2021-06-05 PROCEDURE — 80053 COMPREHEN METABOLIC PANEL: CPT

## 2021-06-05 PROCEDURE — 700102 HCHG RX REV CODE 250 W/ 637 OVERRIDE(OP): Performed by: SURGERY

## 2021-06-05 PROCEDURE — 83735 ASSAY OF MAGNESIUM: CPT

## 2021-06-05 PROCEDURE — 700111 HCHG RX REV CODE 636 W/ 250 OVERRIDE (IP): Performed by: STUDENT IN AN ORGANIZED HEALTH CARE EDUCATION/TRAINING PROGRAM

## 2021-06-05 PROCEDURE — 700102 HCHG RX REV CODE 250 W/ 637 OVERRIDE(OP): Performed by: FAMILY MEDICINE

## 2021-06-05 PROCEDURE — 85025 COMPLETE CBC W/AUTO DIFF WBC: CPT

## 2021-06-05 PROCEDURE — A9270 NON-COVERED ITEM OR SERVICE: HCPCS | Performed by: HOSPITALIST

## 2021-06-05 PROCEDURE — 99233 SBSQ HOSP IP/OBS HIGH 50: CPT | Performed by: INTERNAL MEDICINE

## 2021-06-05 PROCEDURE — 770001 HCHG ROOM/CARE - MED/SURG/GYN PRIV*

## 2021-06-05 PROCEDURE — 93005 ELECTROCARDIOGRAM TRACING: CPT | Performed by: INTERNAL MEDICINE

## 2021-06-05 PROCEDURE — 93010 ELECTROCARDIOGRAM REPORT: CPT | Performed by: INTERNAL MEDICINE

## 2021-06-05 PROCEDURE — 700105 HCHG RX REV CODE 258: Performed by: INTERNAL MEDICINE

## 2021-06-05 PROCEDURE — 97606 NEG PRS WND THER DME>50 SQCM: CPT

## 2021-06-05 RX ORDER — FUROSEMIDE 10 MG/ML
40 INJECTION INTRAMUSCULAR; INTRAVENOUS
Status: DISCONTINUED | OUTPATIENT
Start: 2021-06-05 | End: 2021-06-06

## 2021-06-05 RX ORDER — POTASSIUM CHLORIDE 20 MEQ/1
40 TABLET, EXTENDED RELEASE ORAL DAILY
Status: DISCONTINUED | OUTPATIENT
Start: 2021-06-05 | End: 2021-06-09

## 2021-06-05 RX ORDER — FUROSEMIDE 20 MG/1
20 TABLET ORAL
Status: DISCONTINUED | OUTPATIENT
Start: 2021-06-05 | End: 2021-06-05

## 2021-06-05 RX ORDER — DOXYCYCLINE 100 MG/1
100 TABLET ORAL EVERY 12 HOURS
Status: COMPLETED | OUTPATIENT
Start: 2021-06-05 | End: 2021-06-14

## 2021-06-05 RX ORDER — CIPROFLOXACIN 2 MG/ML
400 INJECTION, SOLUTION INTRAVENOUS EVERY 12 HOURS
Status: DISCONTINUED | OUTPATIENT
Start: 2021-06-05 | End: 2021-06-07

## 2021-06-05 RX ORDER — SODIUM CHLORIDE 9 MG/ML
1000 INJECTION, SOLUTION INTRAVENOUS ONCE
Status: COMPLETED | OUTPATIENT
Start: 2021-06-06 | End: 2021-06-06

## 2021-06-05 RX ADMIN — PAROXETINE HYDROCHLORIDE 20 MG: 20 TABLET, FILM COATED ORAL at 08:55

## 2021-06-05 RX ADMIN — HYDROMORPHONE HYDROCHLORIDE 0.5 MG: 1 INJECTION, SOLUTION INTRAMUSCULAR; INTRAVENOUS; SUBCUTANEOUS at 08:55

## 2021-06-05 RX ADMIN — DOCUSATE SODIUM 50 MG AND SENNOSIDES 8.6 MG 2 TABLET: 8.6; 5 TABLET, FILM COATED ORAL at 06:25

## 2021-06-05 RX ADMIN — FLUDROCORTISONE ACETATE 0.1 MG: 0.1 TABLET ORAL at 06:25

## 2021-06-05 RX ADMIN — CIPROFLOXACIN 400 MG: 2 INJECTION, SOLUTION INTRAVENOUS at 11:10

## 2021-06-05 RX ADMIN — CLONAZEPAM 0.5 MG: 0.5 TABLET ORAL at 08:55

## 2021-06-05 RX ADMIN — DOXYCYCLINE 100 MG: 100 TABLET, FILM COATED ORAL at 11:17

## 2021-06-05 RX ADMIN — DOXYCYCLINE 100 MG: 100 TABLET, FILM COATED ORAL at 18:33

## 2021-06-05 RX ADMIN — METRONIDAZOLE 500 MG: 500 INJECTION, SOLUTION INTRAVENOUS at 12:54

## 2021-06-05 RX ADMIN — ZIPRASIDONE HYDROCHLORIDE 60 MG: 60 CAPSULE ORAL at 08:58

## 2021-06-05 RX ADMIN — POTASSIUM CHLORIDE 40 MEQ: 1500 TABLET, EXTENDED RELEASE ORAL at 11:10

## 2021-06-05 RX ADMIN — HYDROCORTISONE 10 MG: 10 TABLET ORAL at 06:25

## 2021-06-05 RX ADMIN — CLONAZEPAM 1 MG: 1 TABLET ORAL at 18:34

## 2021-06-05 RX ADMIN — METRONIDAZOLE 500 MG: 500 INJECTION, SOLUTION INTRAVENOUS at 22:26

## 2021-06-05 RX ADMIN — Medication 1000 UNITS: at 06:25

## 2021-06-05 RX ADMIN — SODIUM CHLORIDE 1000 ML: 9 INJECTION, SOLUTION INTRAVENOUS at 23:37

## 2021-06-05 RX ADMIN — FUROSEMIDE 40 MG: 10 INJECTION, SOLUTION INTRAMUSCULAR; INTRAVENOUS at 11:15

## 2021-06-05 RX ADMIN — APIXABAN 5 MG: 5 TABLET, FILM COATED ORAL at 06:24

## 2021-06-05 RX ADMIN — DIGOXIN 125 MCG: 125 TABLET ORAL at 18:33

## 2021-06-05 RX ADMIN — DOCUSATE SODIUM 100 MG: 100 CAPSULE, LIQUID FILLED ORAL at 06:25

## 2021-06-05 RX ADMIN — HYDROCORTISONE 5 MG: 10 TABLET ORAL at 14:59

## 2021-06-05 RX ADMIN — OXYCODONE 10 MG: 5 TABLET ORAL at 18:38

## 2021-06-05 RX ADMIN — METOPROLOL SUCCINATE 12.5 MG: 25 TABLET, EXTENDED RELEASE ORAL at 06:24

## 2021-06-05 RX ADMIN — POLYETHYLENE GLYCOL 3350 1 PACKET: 17 POWDER, FOR SOLUTION ORAL at 06:28

## 2021-06-05 RX ADMIN — OXYCODONE 10 MG: 5 TABLET ORAL at 06:23

## 2021-06-05 RX ADMIN — ONDANSETRON 4 MG: 2 INJECTION INTRAMUSCULAR; INTRAVENOUS at 02:44

## 2021-06-05 RX ADMIN — CIPROFLOXACIN 400 MG: 2 INJECTION, SOLUTION INTRAVENOUS at 18:40

## 2021-06-05 RX ADMIN — ATORVASTATIN CALCIUM 40 MG: 40 TABLET, FILM COATED ORAL at 18:33

## 2021-06-05 RX ADMIN — LEVOTHYROXINE SODIUM 125 MCG: 0.12 TABLET ORAL at 06:25

## 2021-06-05 RX ADMIN — APIXABAN 5 MG: 5 TABLET, FILM COATED ORAL at 18:33

## 2021-06-05 RX ADMIN — OXYCODONE 10 MG: 5 TABLET ORAL at 00:02

## 2021-06-05 RX ADMIN — METRONIDAZOLE 500 MG: 500 TABLET ORAL at 06:25

## 2021-06-05 ASSESSMENT — ENCOUNTER SYMPTOMS
HALLUCINATIONS: 0
FLANK PAIN: 0
NERVOUS/ANXIOUS: 0
ABDOMINAL PAIN: 0
PALPITATIONS: 0
WEAKNESS: 1
COUGH: 0
DEPRESSION: 1
CONSTIPATION: 0
SORE THROAT: 0
SPEECH CHANGE: 0
NAUSEA: 0
MYALGIAS: 0
FEVER: 0
SHORTNESS OF BREATH: 0
VOMITING: 0
HEADACHES: 0
CHILLS: 0
HEARTBURN: 0
SENSORY CHANGE: 0
DIARRHEA: 0
DIZZINESS: 0
FOCAL WEAKNESS: 0
BACK PAIN: 0
NECK PAIN: 0
BLURRED VISION: 0
DIAPHORESIS: 0
WHEEZING: 0

## 2021-06-05 ASSESSMENT — PAIN DESCRIPTION - PAIN TYPE
TYPE: ACUTE PAIN
TYPE: ACUTE PAIN;OTHER (COMMENT)
TYPE: ACUTE PAIN
TYPE: ACUTE PAIN
TYPE: ACUTE PAIN;CHRONIC PAIN;SURGICAL PAIN
TYPE: ACUTE PAIN
TYPE: ACUTE PAIN
TYPE: ACUTE PAIN;SURGICAL PAIN

## 2021-06-05 NOTE — WOUND TEAM
[facility]  Inpatient Services  Wound and Skin Care Progress Note    Admission Date: 2/9/2021     Last order of IP CONSULT TO WOUND CARE was found on 3/18/2021 from Hospital Encounter on 2/9/2021     HPI, PMH, SH: Reviewed    Past Surgical History:   Procedure Laterality Date   • PB EXPLORATORY OF ABDOMEN  5/27/2021    Procedure: LAPAROTOMY, EXPLORATORY;  Surgeon: Kin Desouza D.O.;  Location: [facility];  Service: General   • COLOSTOMY TAKEDOWN  5/27/2021    Procedure: COLOSTOMY TAKEDOWN AND CLOSURE;  Surgeon: Kin Desouza D.O.;  Location: [facility];  Service: General   • COLECTOMY N/A 11/10/2020    Procedure: COLECTOMY-SEGMENTAL, COLOSTOMY, MOBILIZATION OF SPLENIC FLEXURE, WOUND VAC PLACEMENT, IRRIGATION AND DEBRIDMENT FLANK WOUND;  Surgeon: Kin Desouza D.O.;  Location: [facility];  Service: General   • ZZZ CARDIAC CATH  07/21/2018    EF 45%, normal coronaries.   • IRRIGATION & DEBRIDEMENT ORTHO Right 2/19/2018    Procedure: IRRIGATION & DEBRIDEMENT ORTHO-FOOT;  Surgeon: Kirby Lopez M.D.;  Location: [facility];  Service: Orthopedics   • TOE AMPUTATION Right 1/17/2018    Procedure: TOE AMPUTATION-1ST RAY;  Surgeon: Doug Delong M.D.;  Location: [facility];  Service: Orthopedics   • TOE AMPUTATION Right 11/10/2017    Procedure: TOE AMPUTATION;  Surgeon: Osorio Leo M.D.;  Location: [facility];  Service: Orthopedics     Social History     Tobacco Use   • Smoking status: Never Smoker   • Smokeless tobacco: Never Used   Substance Use Topics   • Alcohol use: No     Chief Complaint   Patient presents with   • Syncope     X1 this morning when standing up to go to bathroom     Diagnosis: Syncope and collapse [R55]  Suicidal ideation [R45.851]  Sepsis due to Klebsiella pneumoniae (HCC) [A41.4]    Unit where seen by Wound Team: T418/00     WOUND CONSULT/FOLLOW UP RELATED TO: abdominal wound    WOUND HISTORY:  Pt is an older  gentleman well known to [facility] wound team 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 Pt currently admitted since 2/9/21 related to SI concerns due to body image issues related to colostomy.      *Pt underwent surgical reversal of colostomy with Dr. Desouza on 5/20/21. MLI was re-opened at that time and was then closed with interrupted staples and packing. Unfortunately MLI continued to Dehisce and therefore wound team was consulted to further manage MLI with dakins packing.     6/3 Wound vac placed    WOUND ASSESSMENT/LDA    Negative Pressure Wound Therapy 06/03/21 Abdomen (Active)   Vacuum Serial Number [id number] 06/04/21 2040   NPWT Pump Mode / Pressure Setting Ulta;Continuous;125 mmHg 06/05/21 0900   Dressing Type Medium;Black Foam (Regular) 06/05/21 0900   Number of Foam Pieces Used 3 06/05/21 0900   Canister Changed No 06/05/21 0900   Output (mL) 75 mL 06/05/21 0823   NEXT Dressing Change/Treatment Date 06/08/21 06/05/21 0900   WOUND NURSE ONLY - Time Spent with Patient (mins) 60 06/05/21 0900     Wound 05/27/21 Full Thickness Wound Abdomen (Active)   Wound Image   06/05/21 0900   Site Assessment Red;Yellow;Bleeding 06/05/21 0900   Periwound Assessment Pink;Pale;Fragile 06/05/21 0900   Margins Defined edges;Unattached edges 06/05/21 0900   Closure Secondary intention 06/05/21 0900   Drainage Amount Small 06/05/21 0900   Drainage Description Sanguineous 06/05/21 0900   Treatments Cleansed;Site care 06/05/21 0900   Wound Cleansing Dakin's Solution 06/05/21 0900   Periwound Protectant Skin Protectant Wipes to Periwound;Drape 06/05/21 0900   Dressing Cleansing/Solutions Not Applicable 06/05/21 0900   Dressing Options Wound Vac 06/05/21 0900   Dressing Changed Changed 06/05/21 0900   Dressing Status Clean;Dry;Intact 06/05/21 0900   Dressing Change/Treatment Frequency Tuesday, Thursday, Saturday, and As Needed 06/05/21 0900   NEXT Dressing Change/Treatment Date 06/08/21 06/05/21 0900   NEXT Weekly  Photo (Inpatient Only) 06/12/21 06/05/21 0900   Non-staged Wound Description Full thickness 06/05/21 0900   Wound Length (cm) 19 cm 06/05/21 0900   Wound Width (cm) 8.5 cm 06/05/21 0900   Wound Depth (cm) 3.5 cm 06/05/21 0900   Wound Surface Area (cm^2) 161.5 cm^2 06/05/21 0900   Wound Volume (cm^3) 565.25 cm^3 06/05/21 0900   Wound Healing % -590 06/05/21 0900   Shape linear 06/05/21 0900   Wound Odor None 06/05/21 0900   Pulses N/A 06/05/21 0900   Exposed Structures None 06/05/21 0900   WOUND NURSE ONLY - Time Spent with Patient (mins) 60 06/05/21 0900       Vascular:    SHAYAN:   No results found.    Lab Values:    Lab Results   Component Value Date/Time    WBC 17.4 (H) 06/05/2021 06:33 AM    RBC 5.22 06/05/2021 06:33 AM    HEMOGLOBIN 15.0 06/05/2021 06:33 AM    HEMATOCRIT 46.9 06/05/2021 06:33 AM    CREACTPROT 7.52 (H) 11/29/2020 01:25 AM    SEDRATEWES 13 10/23/2018 08:02 AM    HBA1C 5.2 02/14/2021 01:30 AM      Culture Results show:  No results found for this or any previous visit (from the past 720 hour(s)).    Pain Level/Medicated:  IV Morphine immediately prior to dressing change      INTERVENTIONS BY WOUND TEAM:  Chart and images reviewed. Discussed with bedside RN. All areas of concern (based on picture review, LDA review and discussion with bedside RN) have been thoroughly assessed. Documentation of areas based on significant findings. This RN in to assess patient. Performed standard wound care which includes appropriate positioning, dressing removal and non-selective debridement. Pictures and measurements obtained weekly if/when required.              ABD:  Preparation for Dressing removal:  Dressing removed via push/pull.  Cleansed with: 1/4 Dakin's and gauze  Sharp debridement: NA  Joya wound: Cleansed with Dakin's and gauze, mepitel one to distal and proximal staples.   Primary Dressing: 3 pieces of black foam, one to wound bed, one at superior wound bed and one thin piece to fill L aspect of  wound.  Secondary (Outer) Dressing:  Sealed with drape, hole cut and track pad applied.     Interdisciplinary consultation: Patient, Bedside RN, & General Surgery (Dr. Desouza)    EVALUATION / RATIONALE FOR TREATMENT:  Most Recent Date:    06/5/21: Wound bed improving with increased granulation tissue.  Wound edges approximated prior to drape application.  NPWT continued.    06/03/21:  Fascial sutures intact, wound VAC placed with DR. Desouza.    6/1/21: Wound appears  than yesterdays pictures. Discussed with Dr. Desouza, plan to continue dakins TID for 48hrs, CT scan ordered today. Will re-evaluate on Thursday for potential vac placement.   5/31/21: Wound initially with interrupted staples and packing now with dehiscence. Per Dr. Baeza request wound team initiated dakins packing. Pt may benefit from NPWT to assist with closure.   5/8/21: wound nearly healed. Does not require advanced wound care. Wound team signing off and nursing to re consult if site worsens. Continue with dressing care per order.  4/21/21: abd wound improving. Nursing to continue dsg changes per order.  4/15: Right posterior thigh DTI now resolved. Pink and blanching. Abdominal wound to surface level continuing with dressing POC.  4/7/21: Thigh wound nearly healed and will likely be resolved at next assessment. Abdomen wound appears larger. Continued with Arti to move through inflammatory phase. Hydrofera blue for its bacteriostatic properties and to assist in drainage management.   03/31/21: thigh wound healing well now to a stage 2.  Continue current plan.  All interventions in place.   ABD more denuded and bloody.  Arti to assist in moving wound through inflammatory phase.   3/23/21: Right posterior thigh wound remains a DTI but appears to be evolving into a stage 2. Wound team was previously consulted regarding sacral wound which appeared to be a skin tear however skin now appears to be intact however discolored. Does not appear to  be pressure related as pt is on a waffle, self mobile and sacral mepilex is in use. Area is also not a normal pressure injury shape. Wound team will continue to follow.   3/18/21: Pt with linear DTIs to R posterior thigh from lying on top of frye cath tubing. Offloading precautions ordered. Wound team to continue following.     Goals: Steady decrease in wound area and depth weekly.    WOUND TEAM PLAN OF CARE ([X] for frequency of wound follow up,):   Nursing to follow orders written for wound care. Contact wound team if area fails to progress, deteriorates or with any questions/concerns  Dressing changes by wound team:                   Follow up 3 times weekly:                NPWT change 3 times weekly:   X  Follow up 1-2 times weekly:     Follow up Bi-Monthly:                   Follow up as needed:   Other (explain):     NURSING PLAN OF CARE ORDERS (X):  Dressing changes: See Dressing Care orders: X  Skin care: See Skin Care orders: x  RN Prevention Protocol: x  Rectal tube care: See Rectal Tube Care orders:   Other orders:        Anticipated discharge plans:   LTACH:        SNF/Rehab:                  Home Health Care:           Outpatient Wound Center:            Self/Family Care:        Other:   Group Home

## 2021-06-05 NOTE — PROGRESS NOTES
Surgery    Eating more  Mobilizing some  VAC Change today  WBC up - abx stopped yest    /84   Pulse (!) 105   Temp 37 °C (98.6 °F) (Temporal)   Resp 18   Ht 1.829 m (6')   Wt 115 kg (253 lb 8.5 oz)   SpO2 97%   BMI 34.38 kg/m²     NAD  Abd soft, NT  Ost wound clean/healing  Midline wound VAC taken down, wound bed improving wth less slough  VAC replaced by wound cre nurse.    Recent Labs     06/03/21  0359 06/04/21  0947 06/05/21  0633   WBC 11.1* 12.1* 17.4*   RBC 4.42* 4.77 5.22   HEMOGLOBIN 12.7* 13.7* 15.0   HEMATOCRIT 39.6* 43.3 46.9   MCV 89.6 90.8 89.8   MCH 28.7 28.7 28.7   RDW 48.9 50.0 49.3   PLATELETCT 237 262 377   MPV 10.2 10.5 10.2   NEUTSPOLYS 61.30 68.90 74.40*   LYMPHOCYTES 25.80 18.70* 14.50*   MONOCYTES 8.10 7.40 7.20   EOSINOPHILS 3.70 3.70 2.60   BASOPHILS 0.40 0.50 0.50       A/P  Dong well  Wound improving  Retstart Cipro/flagyl  Mobilize for meals and TID    IF WBC, not better in AM, will check CT

## 2021-06-05 NOTE — CARE PLAN
The patient is watcher.     Shift Goals  Clinical Goals: Pain control, sleep  Patient Goals: Pain control, sleep  Family Goals: N/A    Progress made toward(s) clinical / shift goals: Medicated for pain; see MAR. Patient sleeping intermittently during shift.     Patient is not progressing towards the following goals: NA.

## 2021-06-05 NOTE — PROGRESS NOTES
Received report from AM RNs'; assumed care. Telesitter in place. Legal hold discontinued. A&O x 4, mildly disoriented to time. VSS; patient normally irregular/tachycardic. 87-88% on RA while flat; 2L oxygen added; tachycardia improved. Patient reported mild SOB; increased with exertion/turning/truncal flexion/perineal care. Medicated; see MAR.  Numbness/tingling to BLE baseline. Nausea/emesis after last set of vitals taken; 100 mL brown/watery emesis noted. Abdomen obese, round, edematous. Hypoactive BS x 4 noted. Patient stated tolerating diet. Michael < 17; see previous 2 RN skin check. Patient remarked about low urine output/dysuria. Fluids encouraged. Noted Blood warmer than skin temperature when holding vials with prepping to send to lab. X 2 BMs over shift; senna colored; beginning of shift soft and end of shift watery. Pt tolerating IV Mg. POC discussed. Questions answered. Bed locked/lowest position. Call light within reach. HFR; bed alarm on/engaged. All needs met throughout shift.

## 2021-06-05 NOTE — CONSULTS
BRIEF PSYCHOLOGY NOTE: Attempted to meet with the patient however he was busy with occupational therapy. Was unable to return again. Will follow up with the patient over the weekend for brief psychotherapy.    Plan:    Will follow for brief psychotherapy.

## 2021-06-05 NOTE — PROGRESS NOTES
2 RN skin check complete.   Devices in place: NC, Wound vac.      Skin assessed under devices: above.     Abdominal edema noted. Scattered bruising/scabbing noted throughout. RUE Midline PIV; CDI dressing. MLI with wound vac; CDI dressing. Small open blister/moisture to right trunk. Old ostomy site LLQ. Redness noted to BUE/skin folds-breast/pannus. BLE flaky shins/calloused feet. Blanching sacrum; red/purple.     Confirmed pressure ulcers; healed.   New potential pressure ulcers noted on: NA.   Wound team following.     The following interventions in place: Waffle overlay. Prophylactic mediplex to elbows/heels. Uma cream applied. Q2 turns as patient tolerates.

## 2021-06-05 NOTE — CARE PLAN
Problem: Provide Safe Environment  Goal: Suicide environmental safety, protocols, policies, and practices will be implemented  Outcome: Progressing     Problem: Fall Risk  Goal: Patient will remain free from falls  Outcome: Progressing     Problem: Pain - Standard  Goal: Alleviation of pain or a reduction in pain to the patient’s comfort goal  Outcome: Progressing   The patient is Stable - Low risk of patient condition declining or worsening    Shift Goals  Clinical Goals: infection prevention and improve skin integrity  Patient Goals: reduce pain and n/v  Family Goals: n/a    Progress made toward(s) clinical / shift goals:      Started IV ABX and medicating pain as needed.     Patient is not progressing towards the following goals:

## 2021-06-05 NOTE — PROGRESS NOTES
Bedside report received.  Assessment complete.  A&O x 4. Patient calls appropriately.  Patient ambulates with max assist. Bed alarm on.   Patient has 4/10 pain. Pain managed with prescribed medications.  Denies N&V. Tolerating GI soft diet.  Midline woundvac patent and draining serosanugous drainage.   + void, + flatus, + BM.  Patient denies SOB.  Patient comfortable in bed. Aware of wound vac change later this AM.  Review plan with of care with patient. Call light and personal belongings with in reach. Hourly rounding in place. All needs met at this time.

## 2021-06-05 NOTE — PROGRESS NOTES
[facility] Medicine Daily Progress Note    Date of Service  6/5/2021    Chief Complaint  56 y.o. male admitted 2/9/2021 with Syncope.    Hospital Course  Admitted with syncope, he was noted to be bradycardic and hypotensive.  He has known history of permanent atrial fibrillation and he was on diltiazem, digoxin and metoprolol.  These medications were held.  Later on he was restarted on low-dose Toprol and Digoxin.  He was also started on anticoagulation with Eliquis.  He also had a possible history of adrenal insufficiency, he was already on Florinef and midodrine.  Midodrine was discontinued and he was started on cortisol. Further work-up showed that he had gram-negative bacteremia as well as urinary tract infection and acute kidney injury.  He was initially placed on IV Zosyn, later changed to oral Bactrim to finish the course.  He was carefully hydrated with IV fluids.  On admission he also expressed suicidal ideation.  Psychiatry was consulted the case, he was placed on a legal hold.  He was noted to have severe depression.  He has been placed on medication the form of Geodon, Paxil, Klonopin.  He has a colostomy in place, which the patient believes contributes to his severe depression because of ostomy care.  He had history of bowel perforation and splenic fluid collection requiring Segmental colectomy, colostomy creation, mobilization of splenic flexure, wound VAC placement, irrigation and debridement of flank wound on 11/10/2020.  Surgery was consulted on the case, and he underwent exploratory laparotomy and colostomy takedown and closure on 5/27/2021.  6/1- restarted antibiotic per surgery team. WBC stable since started on antibiotic per surgery.     Interval Problem Update  Colostomy - pain mostly controlled  Suicidal - less thoughts  Afib - rate 100-120  Diabetes -   SRUTHI - crea trended down to 0.8  Low potassium and magnesium    6/1- pt denies pain, or other concerns. WBC increasing. Antibiotic was  started per surgery team who is concern for infection. CT abdomen ?? Still pending legal hold eval   6/2- pt continues to have tenderness on his surgical site. WBC improving on antibiotic. No need for CT scan. Legal hold pending. Wound vac per surgery.   6/3- wound vac placed today. He complains of some abdominal soreness. No other event. Cannot be transfer at behavior [facility] with wound vac. Continue care inpatient. WBC stable. Still sometimes tachycardic. Continue to  Monitor. Discussed with surgery, eliquis restarted   6/4- feeling well. No abdominal pain. Wound vac in place. Legal hold discontinued     6/5- pt is feeling well overall. He has no problem with wound vac. No signs of active infection around the wound vac. Pictures reviewed and wound is healing. WBC is increasing though. He remain tachycardic. His wound is quite extensive still. He did miss one dose of cipro yesterday however his WBC was increasing even him being on antibiotic. He is afebrile. Resume IV antibiotic for now. He might need longer suppression antibiotic therapy since he is on steroid, and poor healing due to his body habitus., I will add doxycycline too to add MRSA coverage. I will add lasix IV since pt still look some volume overload. I will discontinue florinef for now. That will contribute on water retention and it is a corticosteroid. He is already on hydrocortisone, no need for two steroids at this time. I will hold on CT scan and consider if clinically worsen or new findings. Goal to reduce hydrocortisone to 5 mg BID next week.    Consultants/Specialty  Cardiology  Psychiatry  Surgery    Code Status  Full Code    Disposition  Legal Hold  SNF vs Inpatient psychiatry vs Group home    Review of Systems  Review of Systems   Constitutional: Positive for malaise/fatigue. Negative for chills, diaphoresis and fever.   HENT: Negative for congestion, hearing loss and sore throat.    Eyes: Negative for blurred vision.   Respiratory:  Negative for cough, shortness of breath and wheezing.    Cardiovascular: Negative for chest pain, palpitations and leg swelling.   Gastrointestinal: Negative for abdominal pain, constipation, diarrhea, heartburn, nausea and vomiting.   Genitourinary: Negative for dysuria, flank pain and hematuria.   Musculoskeletal: Negative for back pain, joint pain, myalgias and neck pain.   Skin: Negative for rash.   Neurological: Positive for weakness. Negative for dizziness, sensory change, speech change, focal weakness and headaches.   Psychiatric/Behavioral: Positive for depression. Negative for hallucinations and suicidal ideas. The patient is not nervous/anxious.       Physical Exam  Temp:  [36.4 °C (97.5 °F)-37 °C (98.6 °F)] 37 °C (98.6 °F)  Pulse:  [105-120] 105  Resp:  [18] 18  BP: (121-140)/(78-91) 126/84  SpO2:  [90 %-97 %] 97 %    Physical Exam  Vitals and nursing note reviewed.   Constitutional:       Appearance: He is obese.   HENT:      Head: Normocephalic and atraumatic.      Nose: No congestion.      Mouth/Throat:      Mouth: Mucous membranes are moist.   Eyes:      Extraocular Movements: Extraocular movements intact.      Conjunctiva/sclera: Conjunctivae normal.      Pupils: Pupils are equal, round, and reactive to light.   Cardiovascular:      Rate and Rhythm: Normal rate. Rhythm irregularly irregular.   Pulmonary:      Effort: Pulmonary effort is normal.      Breath sounds: Normal breath sounds.   Abdominal:      General: There is no distension.      Tenderness: There is no abdominal tenderness. There is no guarding or rebound.      Comments: Wound vac in place    Musculoskeletal:      Cervical back: No muscular tenderness.      Right lower leg: Edema present.      Left lower leg: Edema present.   Skin:     General: Skin is warm and dry.   Neurological:      General: No focal deficit present.      Mental Status: He is alert and oriented to person, place, and time.      Cranial Nerves: No cranial nerve deficit.    Psychiatric:         Mood and Affect: Mood is not anxious.         Speech: Speech normal.         Thought Content: Thought content is not paranoid or delusional. Thought content includes suicidal ideation. Thought content does not include homicidal ideation. Thought content does not include homicidal or suicidal plan.         Judgment: Judgment is not impulsive.       Fluids    Intake/Output Summary (Last 24 hours) at 6/5/2021 1106  Last data filed at 6/5/2021 0823  Gross per 24 hour   Intake 340 ml   Output 475 ml   Net -135 ml       Laboratory  Recent Labs     06/03/21 0359 06/04/21  0947 06/05/21  0633   WBC 11.1* 12.1* 17.4*   RBC 4.42* 4.77 5.22   HEMOGLOBIN 12.7* 13.7* 15.0   HEMATOCRIT 39.6* 43.3 46.9   MCV 89.6 90.8 89.8   MCH 28.7 28.7 28.7   MCHC 32.1* 31.6* 32.0*   RDW 48.9 50.0 49.3   PLATELETCT 237 262 377   MPV 10.2 10.5 10.2     Recent Labs     06/03/21 0359 06/04/21  0947 06/05/21  0633   SODIUM 139 140 141   POTASSIUM 3.2* 3.4* 3.5*   CHLORIDE 104 105 104   CO2 26 25 25   GLUCOSE 116* 111* 158*   BUN 15 13 14   CREATININE 0.74 0.91 1.14   CALCIUM 8.2* 8.4* 8.6                   Imaging  IR-MIDLINE CATHETER INSERTION WO GUIDANCE > AGE 3   Final Result                  Ultrasound-guided midline placement performed by qualified nursing staff    as above.          EC-ECHOCARDIOGRAM COMPLETE W/ CONT   Final Result      DX-CHEST-PORTABLE (1 VIEW)   Final Result      1.  Minor asymmetric interstitial opacity in the right lower lobe which could represent minor interstitial edema, pneumonia, or fibrotic change.      2.  No left lung consolidation.      3.  No evidence of congestive failure.      JI-[id number]-3 VIEW   Final Result         No specific finding to suggest small bowel obstruction.      AH-ZMLFQXD-5 VIEW   Final Result         No significant interval change.      DX-CHEST-LIMITED (1 VIEW)   Final Result         Diffuse interstitial prominence could relate to mild pulmonary edema or atypical  infection      VB-[id number]-7 VIEW   Final Result      Increased colonic gas without definite bowel obstruction.      US-RENAL   Final Result      No evidence of hydronephrosis.      Lobulated kidneys bilaterally.      CT-ABDOMEN-PELVIS WITH   Final Result      1.  There is no evidence of small bowel obstruction.   2.  There is a left lower quadrant ostomy.   3.  There is fluid distention of the colon distal to the surgical material in the left mid descending colon extending all the way to the rectum. There is no pneumatosis or free air.   4.  There is cholelithiasis without biliary dilatation.   5.  There has been interval removal of the drainage catheter anterior to the spleen with minimal hypodense area in the anterior spleen again noted. There is no new fluid collection.      IR-US GUIDED PIV   Final Result    Ultrasound-guided PERIPHERAL IV INSERTION performed by    qualified nursing staff as above.      EC-ECHOCARDIOGRAM COMPLETE W/O CONT   Final Result      DX-LUMBAR SPINE-2 OR 3 VIEWS   Final Result      Moderate compression deformity of T11 is new compared to 2018.      Mild wedge deformity of T12 is unchanged.      Degenerative changes including facet arthropathy.      Mild retrolisthesis of L5 on S1 and L3 on L4.              Assessment/Plan  Leukocytosis- (present on admission)  Assessment & Plan  Follow cbc  6/4- still persistent. Continue to monitor   6/5- SIRS positive. WBC increasing . No clear source of infection. Continue antibiotic cipro/flagyl/doxycycline     Obstructive uropathy- (present on admission)  Assessment & Plan  Bladder scan and straight cath prn    Permanent atrial fibrillation (HCC)- (present on admission)  Assessment & Plan  Toprol, resume Digoxin  6/3- discussed with surgery. eliquis resumed     Chronic venous insufficiency of lower extremity- (present on admission)  Assessment & Plan  compression stockings.    Chronic heart failure with preserved ejection fraction (HCC)- (present on  admission)  Assessment & Plan  Toprol  follow probnp    Type 2 diabetes mellitus with hyperglycemia, with long-term current use of insulin (HCC)- (present on admission)  Assessment & Plan  Follow bmp  Currently not requiring Insulin    Major depressive disorder, recurrent, severe with psychotic features (HCC)- (present on admission)  Assessment & Plan  Geodon, Paxil, Klonopin    Colostomy present (HCC)- (present on admission)  Assessment & Plan  Exploratory laparotomy with colostomy takedown and closure 5/27/2021  Segmental colectomy, colostomy creation, mobilization of splenic flexure, wound VAC placement, irrigation and debridement of flank wound on 11/10/2020.  6/1- abdominal wound pictures reviewed. Review recs from surgery. Started on antibiotic. Continue to monitor   6/2- discuss with surgery. Continue oral antibiotic. WBC improving. Wound vac per surgery.   6/3- wound vac placed. Continue wound care, antibiotic per surgery recs. Continue to monitor   6/5- pt tachycardic, WBC continues to increase. Resume antibiotic. Possible pt will need suppression antibiotic therapy since his wound is high risk for infection due to him being on steroid, body habitus. No other source of infection at this time.   Cipro/flagyl/doxycycline until 6/14      Hypothyroidism- (present on admission)  Assessment & Plan  Increased Levothyroxine to 125 mcg  Check TSH on 6/29/2021    Adrenal insufficiency (HCC)- (present on admission)  Assessment & Plan  Possible?  Goal to reduce corticef 5 mg BID 6/12  And then corticef 5 mg daily on 7/4  He need endo chronology evaluation     Debility- (present on admission)  Assessment & Plan  PT and OT   vit d level low 5/29- start replacement     Suicidal ideation- (present on admission)  Assessment & Plan  6/4- legal hold discontinued.   Possible SNF placement     Orthostatic hypotension- (present on admission)  Assessment & Plan  Stop florinef. Consider midodrine once heart rate improving    Florinef increase risk of water retention, poor wound healing and he is already on hydrocortisone   He is not moving either way. So orthostatic should not be a problem at this time     Mixed hyperlipidemia- (present on admission)  Assessment & Plan  Atorvastatin.     Lower limb amputation, great toe (HCC)- (present on admission)  Assessment & Plan  monitor    Hypomagnesemia- (present on admission)  Assessment & Plan  6/3-IV Mg 2 g  6/4- mg 1.3-replace 4 mg IV   6/5- normal. Continue to monitor     Class 2 severe obesity with serious comorbidity and body mass index (BMI) of 35.0 to 35.9 in adult (HCC)- (present on admission)  Assessment & Plan  Body mass index is 35.19 kg/m².    Hypokalemia- (present on admission)  Assessment & Plan  Start Kdur  follow bmp  6/1- replace. Continue to monitor   6/3- replaced. magnesium too. Continue to monitor   6/5- continue potassium 40 mEq daily. Continue to monitor        VTE prophylaxis: Lovenox

## 2021-06-06 LAB
ALBUMIN SERPL BCP-MCNC: 2.5 G/DL (ref 3.2–4.9)
ALBUMIN/GLOB SERPL: 0.7 G/DL
ALP SERPL-CCNC: 96 U/L (ref 30–99)
ALT SERPL-CCNC: 12 U/L (ref 2–50)
ANION GAP SERPL CALC-SCNC: 11 MMOL/L (ref 7–16)
AST SERPL-CCNC: 16 U/L (ref 12–45)
BASOPHILS # BLD AUTO: 0.3 % (ref 0–1.8)
BASOPHILS # BLD: 0.06 K/UL (ref 0–0.12)
BILIRUB SERPL-MCNC: 0.6 MG/DL (ref 0.1–1.5)
BUN SERPL-MCNC: 18 MG/DL (ref 8–22)
CALCIUM SERPL-MCNC: 8.9 MG/DL (ref 8.5–10.5)
CHLORIDE SERPL-SCNC: 102 MMOL/L (ref 96–112)
CO2 SERPL-SCNC: 25 MMOL/L (ref 20–33)
CREAT SERPL-MCNC: 2.28 MG/DL (ref 0.5–1.4)
EOSINOPHIL # BLD AUTO: 0.57 K/UL (ref 0–0.51)
EOSINOPHIL NFR BLD: 3.2 % (ref 0–6.9)
ERYTHROCYTE [DISTWIDTH] IN BLOOD BY AUTOMATED COUNT: 51 FL (ref 35.9–50)
GLOBULIN SER CALC-MCNC: 3.5 G/DL (ref 1.9–3.5)
GLUCOSE SERPL-MCNC: 128 MG/DL (ref 65–99)
HCT VFR BLD AUTO: 42.6 % (ref 42–52)
HGB BLD-MCNC: 13.2 G/DL (ref 14–18)
IMM GRANULOCYTES # BLD AUTO: 0.11 K/UL (ref 0–0.11)
IMM GRANULOCYTES NFR BLD AUTO: 0.6 % (ref 0–0.9)
LYMPHOCYTES # BLD AUTO: 2.61 K/UL (ref 1–4.8)
LYMPHOCYTES NFR BLD: 14.7 % (ref 22–41)
MAGNESIUM SERPL-MCNC: 2 MG/DL (ref 1.5–2.5)
MCH RBC QN AUTO: 28.5 PG (ref 27–33)
MCHC RBC AUTO-ENTMCNC: 31 G/DL (ref 33.7–35.3)
MCV RBC AUTO: 92 FL (ref 81.4–97.8)
MONOCYTES # BLD AUTO: 1.32 K/UL (ref 0–0.85)
MONOCYTES NFR BLD AUTO: 7.4 % (ref 0–13.4)
NEUTROPHILS # BLD AUTO: 13.1 K/UL (ref 1.82–7.42)
NEUTROPHILS NFR BLD: 73.8 % (ref 44–72)
NRBC # BLD AUTO: 0 K/UL
NRBC BLD-RTO: 0 /100 WBC
PLATELET # BLD AUTO: 357 K/UL (ref 164–446)
PMV BLD AUTO: 10.9 FL (ref 9–12.9)
POTASSIUM SERPL-SCNC: 3.8 MMOL/L (ref 3.6–5.5)
PROT SERPL-MCNC: 6 G/DL (ref 6–8.2)
RBC # BLD AUTO: 4.63 M/UL (ref 4.7–6.1)
SODIUM SERPL-SCNC: 138 MMOL/L (ref 135–145)
WBC # BLD AUTO: 17.8 K/UL (ref 4.8–10.8)

## 2021-06-06 PROCEDURE — 83735 ASSAY OF MAGNESIUM: CPT

## 2021-06-06 PROCEDURE — 770020 HCHG ROOM/CARE - TELE (206)

## 2021-06-06 PROCEDURE — 700102 HCHG RX REV CODE 250 W/ 637 OVERRIDE(OP): Performed by: STUDENT IN AN ORGANIZED HEALTH CARE EDUCATION/TRAINING PROGRAM

## 2021-06-06 PROCEDURE — A9270 NON-COVERED ITEM OR SERVICE: HCPCS | Performed by: STUDENT IN AN ORGANIZED HEALTH CARE EDUCATION/TRAINING PROGRAM

## 2021-06-06 PROCEDURE — 700102 HCHG RX REV CODE 250 W/ 637 OVERRIDE(OP): Performed by: HOSPITALIST

## 2021-06-06 PROCEDURE — A9270 NON-COVERED ITEM OR SERVICE: HCPCS | Performed by: NURSE PRACTITIONER

## 2021-06-06 PROCEDURE — 80053 COMPREHEN METABOLIC PANEL: CPT

## 2021-06-06 PROCEDURE — 700111 HCHG RX REV CODE 636 W/ 250 OVERRIDE (IP): Performed by: SURGERY

## 2021-06-06 PROCEDURE — 85025 COMPLETE CBC W/AUTO DIFF WBC: CPT

## 2021-06-06 PROCEDURE — A9270 NON-COVERED ITEM OR SERVICE: HCPCS | Performed by: INTERNAL MEDICINE

## 2021-06-06 PROCEDURE — 700102 HCHG RX REV CODE 250 W/ 637 OVERRIDE(OP): Performed by: FAMILY MEDICINE

## 2021-06-06 PROCEDURE — 99233 SBSQ HOSP IP/OBS HIGH 50: CPT | Performed by: INTERNAL MEDICINE

## 2021-06-06 PROCEDURE — A9270 NON-COVERED ITEM OR SERVICE: HCPCS | Performed by: FAMILY MEDICINE

## 2021-06-06 PROCEDURE — 700102 HCHG RX REV CODE 250 W/ 637 OVERRIDE(OP): Performed by: NURSE PRACTITIONER

## 2021-06-06 PROCEDURE — A9270 NON-COVERED ITEM OR SERVICE: HCPCS | Performed by: HOSPITALIST

## 2021-06-06 PROCEDURE — 700101 HCHG RX REV CODE 250: Performed by: SURGERY

## 2021-06-06 PROCEDURE — 90832 PSYTX W PT 30 MINUTES: CPT | Performed by: PSYCHOLOGIST

## 2021-06-06 PROCEDURE — 700102 HCHG RX REV CODE 250 W/ 637 OVERRIDE(OP): Performed by: INTERNAL MEDICINE

## 2021-06-06 RX ORDER — METOPROLOL SUCCINATE 50 MG/1
50 TABLET, EXTENDED RELEASE ORAL
Status: DISCONTINUED | OUTPATIENT
Start: 2021-06-06 | End: 2021-06-07

## 2021-06-06 RX ADMIN — CLONAZEPAM 1 MG: 1 TABLET ORAL at 17:21

## 2021-06-06 RX ADMIN — ACETAMINOPHEN 650 MG: 325 TABLET, FILM COATED ORAL at 17:30

## 2021-06-06 RX ADMIN — DIGOXIN 125 MCG: 125 TABLET ORAL at 18:00

## 2021-06-06 RX ADMIN — PAROXETINE HYDROCHLORIDE 20 MG: 20 TABLET, FILM COATED ORAL at 08:33

## 2021-06-06 RX ADMIN — ATORVASTATIN CALCIUM 40 MG: 40 TABLET, FILM COATED ORAL at 17:21

## 2021-06-06 RX ADMIN — ZIPRASIDONE HYDROCHLORIDE 60 MG: 60 CAPSULE ORAL at 21:47

## 2021-06-06 RX ADMIN — CIPROFLOXACIN 400 MG: 2 INJECTION, SOLUTION INTRAVENOUS at 08:26

## 2021-06-06 RX ADMIN — METRONIDAZOLE 500 MG: 500 INJECTION, SOLUTION INTRAVENOUS at 21:47

## 2021-06-06 RX ADMIN — APIXABAN 5 MG: 5 TABLET, FILM COATED ORAL at 17:21

## 2021-06-06 RX ADMIN — METRONIDAZOLE 500 MG: 500 INJECTION, SOLUTION INTRAVENOUS at 14:06

## 2021-06-06 RX ADMIN — CLONAZEPAM 0.5 MG: 0.5 TABLET ORAL at 08:33

## 2021-06-06 RX ADMIN — CIPROFLOXACIN 400 MG: 2 INJECTION, SOLUTION INTRAVENOUS at 17:21

## 2021-06-06 RX ADMIN — DOXYCYCLINE 100 MG: 100 TABLET, FILM COATED ORAL at 17:21

## 2021-06-06 RX ADMIN — HYDROCORTISONE 10 MG: 10 TABLET ORAL at 06:18

## 2021-06-06 RX ADMIN — METRONIDAZOLE 500 MG: 500 INJECTION, SOLUTION INTRAVENOUS at 06:04

## 2021-06-06 RX ADMIN — ZIPRASIDONE HYDROCHLORIDE 60 MG: 60 CAPSULE ORAL at 08:33

## 2021-06-06 RX ADMIN — HYDROCORTISONE 5 MG: 10 TABLET ORAL at 14:06

## 2021-06-06 RX ADMIN — METOPROLOL SUCCINATE 12.5 MG: 25 TABLET, EXTENDED RELEASE ORAL at 06:06

## 2021-06-06 RX ADMIN — APIXABAN 5 MG: 5 TABLET, FILM COATED ORAL at 06:06

## 2021-06-06 RX ADMIN — LEVOTHYROXINE SODIUM 125 MCG: 0.12 TABLET ORAL at 06:06

## 2021-06-06 RX ADMIN — Medication 1000 UNITS: at 06:06

## 2021-06-06 RX ADMIN — DOXYCYCLINE 100 MG: 100 TABLET, FILM COATED ORAL at 06:06

## 2021-06-06 ASSESSMENT — ENCOUNTER SYMPTOMS
ABDOMINAL PAIN: 0
FOCAL WEAKNESS: 0
MYALGIAS: 0
SENSORY CHANGE: 0
NECK PAIN: 0
DIZZINESS: 0
HALLUCINATIONS: 0
FLANK PAIN: 0
SHORTNESS OF BREATH: 0
VOMITING: 0
DEPRESSION: 1
PALPITATIONS: 0
SPEECH CHANGE: 0
CONSTIPATION: 0
BACK PAIN: 0
HEADACHES: 0
COUGH: 0
CHILLS: 0
BLURRED VISION: 0
WHEEZING: 0
NERVOUS/ANXIOUS: 0
SORE THROAT: 0
WEAKNESS: 1
DIARRHEA: 0
NAUSEA: 0
DIAPHORESIS: 0
HEARTBURN: 0
FEVER: 0

## 2021-06-06 ASSESSMENT — PAIN DESCRIPTION - PAIN TYPE
TYPE: ACUTE PAIN
TYPE: ACUTE PAIN;SURGICAL PAIN
TYPE: ACUTE PAIN

## 2021-06-06 NOTE — CONSULTS
"LATE ENTRY    [facility] BEHAVIORAL HEALTH   TRIAGE ASSESSMENT     Name: Sebastián Castro  MRN: [id number]  : 1965  Age: 56 y.o.  Date of assessment: 6/3/2021  PCP: ELIZABETH Terrazas  Persons in attendance: Patient     CHIEF COMPLAINT/PRESENTING ISSUE (as stated by patient/record):           Chief Complaint   Patient presents with   • Syncope       X1 this morning when standing up to go to bathroom      Patient c/o distress and depression related to colostomy bag (\"I couldn't get used to it... couldn't wrap my brain around it.\"  Patient has had surgery to reverse bag.  Patient reported worsening SI beginning in 2020 when he lost his housing \"It upset my whole life.\"  He reports a fall in 2020 which evidenced a bowel obstruction and a colostomy bag was inserted.  Patient reports suicidal thoughts \"come and go,\" and, \"aren't that serious anymore.\"       CURRENT LIVING SITUATION/SOCIAL SUPPORT: Patient reported needing housing due to his long hospital admission.  This writer reached out to [facility] for [facility] services.  Additional information provided below.       BEHAVIORAL HEALTH TREATMENT HISTORY  Does patient/parent report a history of prior behavioral health treatment for patient?   Yes:    Dates Level of Care Facilty/Provider Diagnosis/Problem Medications     Outpatient primary care, psychiatry, case management and psychotherapy [facility] unk. unk.            SAFETY ASSESSMENT - SELF  Does patient acknowledge current or past symptoms of dangerousness to self? yes  Does parent/significant other report patient has current or past symptoms of dangerousness to self? N\A  Does presenting problem suggest symptoms of dangerousness to self? Patient experiences chronic suicidal ideation and current presentation reported to be his baseline.  Patient denied current thoughts, urges, intent, and/or plans to commit suicide.      SAFETY ASSESSMENT - OTHERS  Does patient acknowledge current or " "past symptoms of aggressive behavior or risk to others? no  Does parent/significant other report patient has current or past symptoms of aggressive behavior or risk to others?  no  Does presenting problem suggest symptoms of dangerousness to others? No     Crisis Safety Plan completed and copy given to patient? pending     ABUSE/NEGLECT SCREENING  Does patient report feeling “unsafe” in his/her home, or afraid of anyone?  no  Does patient report any history of physical, sexual, or emotional abuse?  no  Does parent or significant other report any of the above? N\A  Is there evidence of neglect by self?  no  Is there evidence of neglect by a caregiver? no  Does the patient/parent report any history of CPS/APS/police involvement related to suspected abuse/neglect or domestic violence? no  Based on the information provided during the current assessment, is a mandated report of suspected abuse/neglect being made?  No     SUBSTANCE USE SCREENING  Yes:  Zachary all substances used in the past 30 days:         Last Use Amount   []   Alcohol       []   Marijuana       []   Heroin       []   Prescription Opioids  (used without prescription, for    recreation, or in excess of prescribed amount)       []   Other Prescription  (used without prescription, for    recreation, or in excess of prescribed amount)       []   Cocaine       []   Methamphetamine       []   \"\" drugs (ectasy, MDMA)       []   Other substances                     Patient denies use/abuse of alcohol, illicit substances.       What consequences does the patient associate with any of the above substance use and or addictive behaviors? None     Risk factors for detox (check all that apply):  []   Seizures   []   Diaphoretic (sweating)   []   Tremors   []   Hallucinations   []   Increased blood pressure   []   Decreased blood pressure   []   Other   []   None       [] Patient education on risk factors for detoxification and instructed to return to ER as " needed.        MENTAL STATUS              Participation: Active verbal participation  Grooming: Adequate  Orientation: Fully Oriented  Behavior: Calm  Eye contact: Indirect  Mood: Euthymic  Affect: Flexible  Thought process: Goal-directed  Thought content: Within normal limits  Speech: Rate within normal limits and Volume within normal limits  Perception: Within normal limits  Memory:  No gross evidence of memory deficits  Insight: Limited  Judgment:  Adequate  Other:     Patient endorsed command auditory and visual hallucinations with onset of November 2020.  Patient denied recent experience of AH/VH and presentation was not consistent with experience of internal stimuli.       Collateral information:   Source:  [] Significant other present in person:   [] Significant other by telephone  [] [facility]   [] [facility] Nursing Staff  [] [facility]  [] Other:      [] Unable to complete full assessment due to:  [] Acute intoxication  [] Patient declined to participate/engage  [] Patient verbally unresponsive  [] Significant cognitive deficits  [] Significant perceptual distortions or behavioral disorganization  [] Other:              CLINICAL IMPRESSIONS:  Primary:  MDD, recurrent, moderate, with psychotic features  Secondary:  Rule out [employer]                               IDENTIFIED NEEDS/PLAN:  [Trigger DISPOSITION list for any items marked]     []  Imminent safety risk - self []  Imminent safety risk - others   []  Acute substance withdrawal [x]  Psychosis/Impaired reality testing   [x]  Mood/anxiety []  Substance use/Addictive behavior   [x]  Maladaptive behaviro []  Parent/child conflict   []  Family/Couples conflict []  Biomedical   [x]  Housing []  Financial   []   Legal   Occupational/Educational   []  Domestic violence []  Other:      Recommended Plan of Care:  Refer to [facility] for outpatient wraparound services.  Spoke to  supervisor Tejal Elliott about IOP, housing and  return to wrap around services.     1. Discontinue legal hold  2.  to facilitate IOP, housing, and wrap around care through [facility].  Patient is established with this provider.    3. Recommend APRN from [facility] be contacted to do assessment of patient at [facility] for appropriateness for housing.   4. Requested records need to be sent to [facility]   5. Recommend appointment for IOP assessment at [facility] be secured prior to patient discharge     Does patient express agreement with the above plan? no     Referral appointment(s) scheduled? no     I have discussed findings and recommendations with Dr. Berger who is in agreement with these recommendations.      Referral information sent to the following community providers : N/A.  Writer had telephone contact with Tejal Elliott ( supervisor with [facility]).  Tejal to forward list of records needed for housing and IOP assessments.       If applicable : Referred  to  Caprice Jorge for legal hold follow up at (time): 17:30 hours.        Joann Lloyd L.C.S.W.  6/3/2021

## 2021-06-06 NOTE — DISCHARGE PLANNING
Brief Behavioral Health Note    Length of Intervention:  N/A    Discharge Planning:  Writer received request from [facility] related to consideration of patient for IOP program.  Lisa Elliott [email or web address] requests the following records be provided:    1. Physical and occupational  2. Behavioral Health Evaluation  3. Most recent hospital note  4. Any progress notes that would help [facility] determine level of care needed for patient.    Recommendations  1. Case management follow up with records request  2. Once records sent, follow up with Lisa Elliott to have [facility] APRN do an on onsite evaluation for placement    Joann Lloyd [facility]  Behavioral Health Therapist

## 2021-06-06 NOTE — PROGRESS NOTES
Received report from previous shift RN  Assessment complete.  A&O x 4. Patient calls appropriately.  Patient ambulates with max assist. Bed alarm on. Q2 turns in place   Patient has 3/10 pain. Pain managed with prescribed medications.  Tele monitored   Denies N&V. Tolerating GI soft diet.  Surgical midline with woundvac in place, functioning at this time.  + void, + flatus, + BM.  Patient denies SOB.  Review plan with of care with patient. Call light and personal belongings with in reach. Hourly rounding in place. All needs met at this time.

## 2021-06-06 NOTE — PROGRESS NOTES
Received report from AM RN; assumed care. Lethargy, confusion, slow eye tracking noted at beginning of shift. Patient was diaphoretic, cool extremities, difficulty eating/swallowing noted. A&O x 3 to questions, slurring words and slow to answer. On call MD contacted. Telemetry order received. 2nd set of vitals taken; 86/62 noted. 2nd on call MD contacted. Order received for 1L bolus. Administered. Improved mentation noted after bolus; patient singing along with this RN similar to previous NOC shift. Vital signs improved, no changes with tachycardia. 95% on 2L NC. Patient reported mild SOB. Patient denied nausea, vomiting. Numbness/tingling baseline to extremities. 2 RN skin check performed; see previous PN. Improved ingestion of fluids/foods with improvement with mentation. Abdomen round, obese. Hypoactive BS x 4 noted. + void, sheryl urine noted. + flatus. LBM 06/06; watery brown stool noted. Max assistance with perineal care d/t abdominal pain with turning. Uma cream applied. Patient tolerated IV antibiotics. Patient no longer has a tele-sitter. POC/possible CT discussed. Questions answered. Bed locked/lowest position. Call light/personal belongings within reach. All needs met/patient sleeping at present time.

## 2021-06-06 NOTE — PROGRESS NOTES
86/62 while awake. Lethargy continues. Patient having difficulty holding cup/drinking. Cool extremities. Patient noted to stare up to ceiling with blank look. Patient reported confusion. Per tele monitoring Afib sustaining in 110's-120's. Occasionally rating up to 160s. On call MD contacted. Notified of above. Bolus order received.

## 2021-06-06 NOTE — CONSULTS
"PSYCHOLOGICAL FOLLOW-UP:  Reason for admission: Syncope and collapse [R55]  Suicidal ideation [R45.851]  Sepsis due to Klebsiella pneumoniae (HCC) [A41.4]  Length of Visit: 30min    Legal status: Legal Status: Voluntary    Chief Complaint: \"I feel better.\"    HPI: Met with the patient to assess risk and provide crisis intervention services. Patient presented with a tired affect and reported a \"better\" mood. He was able to laugh and smile appropriately. He continues to deny current and active suicidal thoughts, plans, and intent. He denies auditory hallucinations. He is future focused on continued physical and emotional recovery and is interested in reconnecting to [facility]. Patient identified his colostomy reversal as one of the primary reasons for his improved mood and decreased suicidality. He thanked this writer for working with him and noted his desire to not return to the hospital once discharged. Reminded patient about psychotherapy skills learned during brief psychotherapy.      Psychiatric Examination:  Vitals: Blood pressure 114/70, pulse (!) 104, temperature 35.9 °C (96.6 °F), temperature source Temporal, resp. rate 16, height 1.829 m (6'), weight 115 kg (253 lb 8.5 oz), SpO2 98 %.  Musculoskeletal: normal psychomotor activity, no tics or unusual mannerisms noted  Appearance and Eye Contact: appropriate dress and grooming. Behavior is calm, cooperative,  appropriate eye contact  Attention/Alertness: Alert  Thought Process: Linear, Logical and Goal Directed    Thought Content: No psychotic processes noted  Speech: Clear with normal rate and rhythm  Mood: \"better\"            Affect: tired, still able to laugh and smile appropriately          SI/HI: Denies    Memory: Recent and remote memory appear intact    Orientation: alert, oriented to person, place and time  Insight into symptoms: good  Judgement into symptoms:good    ASSESSMENT: Patient continues to deny current and active suicidality. He continues to " be future focused. No legal hold indicated.     DSM5 Diagnostic Considerations:   Unspecified Personality Disorder  Major Depressive Disorder, recurrent, severe, with psychotic features    PLAN:  Legal status: Voluntary  Anticipate F/U when able  Records reviewed: yes  Will continue to follow loosely   Thank you for the consult.    Madhuri Fernandez, Ph.D.

## 2021-06-06 NOTE — CARE PLAN
The patient is a watcher.    Shift Goals  Clinical Goals: Normotensive, hydration  Patient Goals: Denies  Family Goals: n/a    Progress made toward(s) clinical / shift goals: Lethargic, confused at beginning of shift. Irregular HR. Order for telemetry monitoring, IV bolus received.     Patient is not progressing towards the following goals:       Problem: Fluid Volume  Goal: Fluid volume balance will be maintained  Outcome: Not Progressing  Improved urine output noted, dark sheryl in color at beginning of shift. HOTN mid-shift. On call MD contacted; order received. IV bolus being administered at 250 mL/hour d/t midline difficulties. Patient tolerating.      Problem: Respiratory  Goal: Patient will achieve/maintain optimum respiratory ventilation and gas exchange  Outcome: Not Progressing  Diminished LS. Coughing noted. Patient noted ingesting fluids and coughing shortly afterwards. Expectorating. Possible SLP evaluation/CXR needed. Will reassess after bolus completed/mentation improves.

## 2021-06-06 NOTE — CARE PLAN
The patient is Watcher - Medium risk of patient condition declining or worsening    Shift Goals  Clinical Goals: Safety  Patient Goals: Denies  Family Goals: n/a    Progress made toward(s) clinical / shift goals:  Pt has remained safe of falls.     Patient is not progressing towards the following goals:

## 2021-06-06 NOTE — PROGRESS NOTES
[facility] Medicine Daily Progress Note    Date of Service  6/6/2021    Chief Complaint  56 y.o. male admitted 2/9/2021 with Syncope.    Hospital Course  Admitted with syncope, he was noted to be bradycardic and hypotensive.  He has known history of permanent atrial fibrillation and he was on diltiazem, digoxin and metoprolol.  These medications were held.  Later on he was restarted on low-dose Toprol and Digoxin.  He was also started on anticoagulation with Eliquis.  He also had a possible history of adrenal insufficiency, he was already on Florinef and midodrine.  Midodrine was discontinued and he was started on cortisol. Further work-up showed that he had gram-negative bacteremia as well as urinary tract infection and acute kidney injury.  He was initially placed on IV Zosyn, later changed to oral Bactrim to finish the course.  He was carefully hydrated with IV fluids.  On admission he also expressed suicidal ideation.  Psychiatry was consulted the case, he was placed on a legal hold.  He was noted to have severe depression.  He has been placed on medication the form of Geodon, Paxil, Klonopin.  He has a colostomy in place, which the patient believes contributes to his severe depression because of ostomy care.  He had history of bowel perforation and splenic fluid collection requiring Segmental colectomy, colostomy creation, mobilization of splenic flexure, wound VAC placement, irrigation and debridement of flank wound on 11/10/2020.  Surgery was consulted on the case, and he underwent exploratory laparotomy and colostomy takedown and closure on 5/27/2021.  6/1- restarted antibiotic per surgery team. WBC stable since started on antibiotic per surgery.     Interval Problem Update  Colostomy - pain mostly controlled  Suicidal - less thoughts  Afib - rate 100-120  Diabetes -   SRUTHI - crea trended down to 0.8  Low potassium and magnesium    6/1- pt denies pain, or other concerns. WBC increasing. Antibiotic was  started per surgery team who is concern for infection. CT abdomen ?? Still pending legal hold eval   6/2- pt continues to have tenderness on his surgical site. WBC improving on antibiotic. No need for CT scan. Legal hold pending. Wound vac per surgery.   6/3- wound vac placed today. He complains of some abdominal soreness. No other event. [facility] with [facility]. Continue care inpatient. WBC stable. Still sometimes tachycardic. Continue to  Monitor. Discussed with surgery, eliquis restarted   6/4- feeling well. No abdominal pain. Wound vac in place. Legal hold discontinued     6/5- pt is feeling well overall. He has no problem with wound vac. No signs of active infection around the wound vac. Pictures reviewed and wound is healing. WBC is increasing though. He remain tachycardic. His wound is quite extensive still. He did miss one dose of cipro yesterday however his WBC was increasing even him being on antibiotic. He is afebrile. Resume IV antibiotic for now. He might need longer suppression antibiotic therapy since he is on steroid, and poor healing due to his body habitus., I will add doxycycline too to add MRSA coverage. I will add lasix IV since pt still look some volume overload. I will discontinue florinef for now. That will contribute on water retention and it is a corticosteroid. He is already on hydrocortisone, no need for two steroids at this time. I will hold on CT scan and consider if clinically worsen or new findings. Goal to reduce hydrocortisone to 5 mg BID next week.    6/6- pt feeling tired today. IV one dose lasix given. Later he did have a-fib sustain to 120.  then he became hypotensive and lethargic due to lasix and a-fib with RVR. He did receive 1L of IVF and his mentation, vitals improved. Discontinue lasix. Increase metoprolol. Mild SRUTHI due to lasix. WBC stable. Continue current antibiotic. Continue to monitor      Consultants/Specialty  Cardiology  Psychiatry  Surgery    Code Status  Full Code    Disposition  Legal Hold  SNF vs Inpatient psychiatry vs Group home    Review of Systems  Review of Systems   Constitutional: Positive for malaise/fatigue. Negative for chills, diaphoresis and fever.   HENT: Negative for congestion, hearing loss and sore throat.    Eyes: Negative for blurred vision.   Respiratory: Negative for cough, shortness of breath and wheezing.    Cardiovascular: Negative for chest pain, palpitations and leg swelling.   Gastrointestinal: Negative for abdominal pain, constipation, diarrhea, heartburn, nausea and vomiting.   Genitourinary: Negative for dysuria, flank pain and hematuria.   Musculoskeletal: Negative for back pain, joint pain, myalgias and neck pain.   Skin: Negative for rash.   Neurological: Positive for weakness. Negative for dizziness, sensory change, speech change, focal weakness and headaches.   Psychiatric/Behavioral: Positive for depression. Negative for hallucinations and suicidal ideas. The patient is not nervous/anxious.       Physical Exam  Temp:  [36.1 °C (97 °F)-36.9 °C (98.5 °F)] 36.3 °C (97.4 °F)  Pulse:  [] 111  Resp:  [16-18] 17  BP: ()/(47-75) 110/75  SpO2:  [94 %-99 %] 99 %    Physical Exam  Vitals and nursing note reviewed.   Constitutional:       Appearance: He is obese.   HENT:      Head: Normocephalic and atraumatic.      Nose: No congestion.      Mouth/Throat:      Mouth: Mucous membranes are moist.   Eyes:      Extraocular Movements: Extraocular movements intact.      Conjunctiva/sclera: Conjunctivae normal.      Pupils: Pupils are equal, round, and reactive to light.   Cardiovascular:      Rate and Rhythm: Tachycardia present. Rhythm irregular.   Pulmonary:      Effort: Pulmonary effort is normal.      Breath sounds: Normal breath sounds.   Abdominal:      General: There is no distension.      Tenderness: There is no abdominal tenderness. There is no guarding  or rebound.      Comments: Wound vac in place    Musculoskeletal:      Cervical back: No muscular tenderness.      Right lower leg: No edema.      Left lower leg: No edema.   Skin:     General: Skin is warm and dry.   Neurological:      General: No focal deficit present.      Mental Status: He is alert and oriented to person, place, and time.      Cranial Nerves: No cranial nerve deficit.   Psychiatric:         Mood and Affect: Mood is not anxious.         Speech: Speech normal.         Thought Content: Thought content is not paranoid or delusional. Thought content does not include homicidal or suicidal ideation. Thought content does not include homicidal or suicidal plan.         Judgment: Judgment is not impulsive.       Fluids    Intake/Output Summary (Last 24 hours) at 6/6/2021 1053  Last data filed at 6/6/2021 0708  Gross per 24 hour   Intake 1840 ml   Output 800 ml   Net 1040 ml       Laboratory  Recent Labs     06/04/21  0947 06/05/21  0633 06/06/21  0602   WBC 12.1* 17.4* 17.8*   RBC 4.77 5.22 4.63*   HEMOGLOBIN 13.7* 15.0 13.2*   HEMATOCRIT 43.3 46.9 42.6   MCV 90.8 89.8 92.0   MCH 28.7 28.7 28.5   MCHC 31.6* 32.0* 31.0*   RDW 50.0 49.3 51.0*   PLATELETCT 262 377 357   MPV 10.5 10.2 10.9     Recent Labs     06/04/21  0947 06/05/21  0633 06/06/21  0602   SODIUM 140 141 138   POTASSIUM 3.4* 3.5* 3.8   CHLORIDE 105 104 102   CO2 25 25 25   GLUCOSE 111* 158* 128*   BUN 13 14 18   CREATININE 0.91 1.14 2.28*   CALCIUM 8.4* 8.6 8.9                   Imaging  IR-MIDLINE CATHETER INSERTION WO GUIDANCE > AGE 3   Final Result                  Ultrasound-guided midline placement performed by qualified nursing staff    as above.          EC-ECHOCARDIOGRAM COMPLETE W/ CONT   Final Result      DX-CHEST-PORTABLE (1 VIEW)   Final Result      1.  Minor asymmetric interstitial opacity in the right lower lobe which could represent minor interstitial edema, pneumonia, or fibrotic change.      2.  No left lung consolidation.       3.  No evidence of congestive failure.      [id number] VIEW   Final Result         No specific finding to suggest small bowel obstruction.      EP-[id number]-0 VIEW   Final Result         No significant interval change.      DX-CHEST-LIMITED (1 VIEW)   Final Result         Diffuse interstitial prominence could relate to mild pulmonary edema or atypical infection      [id number] VIEW   Final Result      Increased colonic gas without definite bowel obstruction.      US-RENAL   Final Result      No evidence of hydronephrosis.      Lobulated kidneys bilaterally.      CT-ABDOMEN-PELVIS WITH   Final Result      1.  There is no evidence of small bowel obstruction.   2.  There is a left lower quadrant ostomy.   3.  There is fluid distention of the colon distal to the surgical material in the left mid descending colon extending all the way to the rectum. There is no pneumatosis or free air.   4.  There is cholelithiasis without biliary dilatation.   5.  There has been interval removal of the drainage catheter anterior to the spleen with minimal hypodense area in the anterior spleen again noted. There is no new fluid collection.      IR-US GUIDED PIV   Final Result    Ultrasound-guided PERIPHERAL IV INSERTION performed by    qualified nursing staff as above.      EC-ECHOCARDIOGRAM COMPLETE W/O CONT   Final Result      DX-LUMBAR SPINE-2 OR 3 VIEWS   Final Result      Moderate compression deformity of T11 is new compared to 2018.      Mild wedge deformity of T12 is unchanged.      Degenerative changes including facet arthropathy.      Mild retrolisthesis of L5 on S1 and L3 on L4.              Assessment/Plan  Leukocytosis- (present on admission)  Assessment & Plan  Follow cbc  6/4- still persistent. Continue to monitor   6/5- SIRS positive. WBC increasing . No clear source of infection. Continue antibiotic cipro/flagyl/doxycycline   6/6- stable     Obstructive uropathy- (present on admission)  Assessment & Plan  Bladder scan  and straight cath prn    Permanent atrial fibrillation (HCC)- (present on admission)  Assessment & Plan  Toprol, resume Digoxin  6/3- discussed with surgery. eliquis resumed   6/6- a-fib sustained on 120. Increase metoprolol to 50 mg. Consider to increase digoxin to 250 mg or even consider to start pt on amiodarone. Will consult cardiology if remain like this     Chronic venous insufficiency of lower extremity- (present on admission)  Assessment & Plan  compression stockings.    Chronic heart failure with preserved ejection fraction (HCC)- (present on admission)  Assessment & Plan  Toprol  follow probnp    Type 2 diabetes mellitus with hyperglycemia, with long-term current use of insulin (HCC)- (present on admission)  Assessment & Plan  Follow bmp  Currently not requiring Insulin    Major depressive disorder, recurrent, severe with psychotic features (HCC)- (present on admission)  Assessment & Plan  Geodon, Paxil, Klonopin    Colostomy present (HCC)- (present on admission)  Assessment & Plan  Exploratory laparotomy with colostomy takedown and closure 5/27/2021  Segmental colectomy, colostomy creation, mobilization of splenic flexure, wound VAC placement, irrigation and debridement of flank wound on 11/10/2020.  6/1- abdominal wound pictures reviewed. Review recs from surgery. Started on antibiotic. Continue to monitor   6/2- discuss with surgery. Continue oral antibiotic. WBC improving. Wound vac per surgery.   6/3- wound vac placed. Continue wound care, antibiotic per surgery recs. Continue to monitor   6/5- pt tachycardic, WBC continues to increase. Resume antibiotic. Possible pt will need suppression antibiotic therapy since his wound is high risk for infection due to him being on steroid, body habitus. No other source of infection at this time.   Cipro/flagyl/doxycycline until 6/14 6/6- wbc stable. Continue to monitor. Still slight tachycardic       Hypothyroidism- (present on admission)  Assessment &  Plan  Increased Levothyroxine to 125 mcg  Check TSH on 6/29/2021    SRUTHI (acute kidney injury) (HCC)- (present on admission)  Assessment & Plan  6/1- resolved   6/6- again pt has mild SRUTHI most likely from lasix. Continue to monitor. Avoid nephrotoxic meds     Adrenal insufficiency (HCC)- (present on admission)  Assessment & Plan  Possible?  Goal to reduce corticef 5 mg BID 6/12  And then corticef 5 mg daily on 7/4  He need endo chronology evaluation     Debility- (present on admission)  Assessment & Plan  PT and OT   vit d level low 5/29- start replacement     Suicidal ideation- (present on admission)  Assessment & Plan  6/4- legal hold discontinued.   Possible SNF placement     Orthostatic hypotension- (present on admission)  Assessment & Plan  Stop florinef. Consider midodrine once heart rate improving   Florinef increase risk of water retention, poor wound healing and he is already on hydrocortisone   Possible due to diarrhea ?? Since diarrhea has resolved will continue to monitor, once stable     Mixed hyperlipidemia- (present on admission)  Assessment & Plan  Atorvastatin.     Lower limb amputation, great toe (HCC)- (present on admission)  Assessment & Plan  Monitor  No signs of infection     Hypomagnesemia- (present on admission)  Assessment & Plan  6/3-IV Mg 2 g  6/4- mg 1.3-replace 4 mg IV   6/5- normal. Continue to monitor   6/6- normal. Continue to monitor     Class 2 severe obesity with serious comorbidity and body mass index (BMI) of 35.0 to 35.9 in adult ([facility])- (present on admission)  Assessment & Plan  Body mass index is 35.19 kg/m².    Hypokalemia- (present on admission)  Assessment & Plan  Start Kdur  follow bmp  6/1- replace. Continue to monitor   6/3- replaced. magnesium too. Continue to monitor   6/5- continue potassium 40 mEq daily. Continue to monitor   6/6- normal. Continue to monitor        VTE prophylaxis: Lovenox

## 2021-06-06 NOTE — PROGRESS NOTES
Patient noted to be HOTN, lethargic with cool extremities.  On call hospitalist contacted. Order received for tele monitoring.

## 2021-06-06 NOTE — PROGRESS NOTES
2 RN skin check complete.   Devices in place: NC, Wound vac.       Skin assessed under devices: above.      Abdominal/perineal edema noted. Scattered bruising/scabbing noted throughout. RUE Midline PIV; CDI dressing. VAT team to change Monday. MLI with wound vac; CDI dressing; staples inferior/underneath dressing. Small circular healing blister. Diaphoretic at beginning of shift, improved with IV bolus. Old ostomy site LLQ; well approximated with dermabond Redness noted to BUE/skin folds-breast/pannus/perineum. BLE flaky shins/calloused feet. Right toe healed TMA. Blanching sacrum; red/purple.      Confirmed pressure ulcers; healed.   New potential pressure ulcers noted on: NA.   Wound team following.      The following interventions in place: Waffle overlay. Prophylactic mediplex to elbows/heels. Uma cream applied. Perineal washcloths. Q2 turns as patient tolerates. Frequent perineal checks.

## 2021-06-06 NOTE — DISCHARGE PLANNING
Per  Therapist Joann Lloyd this DPA emailed requested documents to [employer] @ [email or web address]

## 2021-06-07 LAB
ALBUMIN SERPL BCP-MCNC: 2.4 G/DL (ref 3.2–4.9)
ALBUMIN/GLOB SERPL: 0.7 G/DL
ALP SERPL-CCNC: 123 U/L (ref 30–99)
ALT SERPL-CCNC: 16 U/L (ref 2–50)
ANION GAP SERPL CALC-SCNC: 9 MMOL/L (ref 7–16)
AST SERPL-CCNC: 23 U/L (ref 12–45)
BASOPHILS # BLD AUTO: 0.4 % (ref 0–1.8)
BASOPHILS # BLD: 0.06 K/UL (ref 0–0.12)
BILIRUB SERPL-MCNC: 0.6 MG/DL (ref 0.1–1.5)
BUN SERPL-MCNC: 22 MG/DL (ref 8–22)
CALCIUM SERPL-MCNC: 8.7 MG/DL (ref 8.5–10.5)
CHLORIDE SERPL-SCNC: 97 MMOL/L (ref 96–112)
CO2 SERPL-SCNC: 24 MMOL/L (ref 20–33)
CREAT SERPL-MCNC: 2.38 MG/DL (ref 0.5–1.4)
CREAT UR-MCNC: 129.11 MG/DL
CREAT UR-MCNC: 134.14 MG/DL
EOSINOPHIL # BLD AUTO: 0.7 K/UL (ref 0–0.51)
EOSINOPHIL NFR BLD: 4.8 % (ref 0–6.9)
ERYTHROCYTE [DISTWIDTH] IN BLOOD BY AUTOMATED COUNT: 48.8 FL (ref 35.9–50)
GLOBULIN SER CALC-MCNC: 3.3 G/DL (ref 1.9–3.5)
GLUCOSE SERPL-MCNC: 106 MG/DL (ref 65–99)
HCT VFR BLD AUTO: 38.7 % (ref 42–52)
HGB BLD-MCNC: 12.4 G/DL (ref 14–18)
IMM GRANULOCYTES # BLD AUTO: 0.09 K/UL (ref 0–0.11)
IMM GRANULOCYTES NFR BLD AUTO: 0.6 % (ref 0–0.9)
LYMPHOCYTES # BLD AUTO: 2.23 K/UL (ref 1–4.8)
LYMPHOCYTES NFR BLD: 15.4 % (ref 22–41)
MAGNESIUM SERPL-MCNC: 1.8 MG/DL (ref 1.5–2.5)
MCH RBC QN AUTO: 28.7 PG (ref 27–33)
MCHC RBC AUTO-ENTMCNC: 32 G/DL (ref 33.7–35.3)
MCV RBC AUTO: 89.6 FL (ref 81.4–97.8)
MICROALBUMIN UR-MCNC: <1.2 MG/DL
MICROALBUMIN/CREAT UR: NORMAL MG/G (ref 0–30)
MONOCYTES # BLD AUTO: 0.95 K/UL (ref 0–0.85)
MONOCYTES NFR BLD AUTO: 6.6 % (ref 0–13.4)
NEUTROPHILS # BLD AUTO: 10.43 K/UL (ref 1.82–7.42)
NEUTROPHILS NFR BLD: 72.2 % (ref 44–72)
NRBC # BLD AUTO: 0 K/UL
NRBC BLD-RTO: 0 /100 WBC
NT-PROBNP SERPL IA-MCNC: 3007 PG/ML (ref 0–125)
PHOSPHATE SERPL-MCNC: 4.3 MG/DL (ref 2.5–4.5)
PLATELET # BLD AUTO: 328 K/UL (ref 164–446)
PMV BLD AUTO: 10.1 FL (ref 9–12.9)
POTASSIUM SERPL-SCNC: 3.4 MMOL/L (ref 3.6–5.5)
PROT SERPL-MCNC: 5.7 G/DL (ref 6–8.2)
PROT UR-MCNC: 12 MG/DL (ref 0–15)
PROT/CREAT UR: 89 MG/G (ref 15–68)
RBC # BLD AUTO: 4.32 M/UL (ref 4.7–6.1)
SODIUM SERPL-SCNC: 130 MMOL/L (ref 135–145)
WBC # BLD AUTO: 14.5 K/UL (ref 4.8–10.8)

## 2021-06-07 PROCEDURE — 700102 HCHG RX REV CODE 250 W/ 637 OVERRIDE(OP): Performed by: STUDENT IN AN ORGANIZED HEALTH CARE EDUCATION/TRAINING PROGRAM

## 2021-06-07 PROCEDURE — A9270 NON-COVERED ITEM OR SERVICE: HCPCS | Performed by: SURGERY

## 2021-06-07 PROCEDURE — 700102 HCHG RX REV CODE 250 W/ 637 OVERRIDE(OP): Performed by: NURSE PRACTITIONER

## 2021-06-07 PROCEDURE — 83735 ASSAY OF MAGNESIUM: CPT

## 2021-06-07 PROCEDURE — 51798 US URINE CAPACITY MEASURE: CPT

## 2021-06-07 PROCEDURE — 97606 NEG PRS WND THER DME>50 SQCM: CPT

## 2021-06-07 PROCEDURE — A9270 NON-COVERED ITEM OR SERVICE: HCPCS | Performed by: STUDENT IN AN ORGANIZED HEALTH CARE EDUCATION/TRAINING PROGRAM

## 2021-06-07 PROCEDURE — 700101 HCHG RX REV CODE 250: Performed by: SURGERY

## 2021-06-07 PROCEDURE — 82570 ASSAY OF URINE CREATININE: CPT | Mod: 91

## 2021-06-07 PROCEDURE — 0031A HCHG ADM SARSCOV2 VAC AD26 .5 ML: CPT

## 2021-06-07 PROCEDURE — A9270 NON-COVERED ITEM OR SERVICE: HCPCS | Performed by: INTERNAL MEDICINE

## 2021-06-07 PROCEDURE — XW023U6 INTRODUCTION OF COVID-19 VACCINE INTO MUSCLE, PERCUTANEOUS APPROACH, NEW TECHNOLOGY GROUP 6: ICD-10-PCS | Performed by: INTERNAL MEDICINE

## 2021-06-07 PROCEDURE — A9270 NON-COVERED ITEM OR SERVICE: HCPCS | Performed by: NURSE PRACTITIONER

## 2021-06-07 PROCEDURE — 83880 ASSAY OF NATRIURETIC PEPTIDE: CPT

## 2021-06-07 PROCEDURE — 99233 SBSQ HOSP IP/OBS HIGH 50: CPT | Performed by: INTERNAL MEDICINE

## 2021-06-07 PROCEDURE — A9270 NON-COVERED ITEM OR SERVICE: HCPCS | Performed by: FAMILY MEDICINE

## 2021-06-07 PROCEDURE — 84156 ASSAY OF PROTEIN URINE: CPT

## 2021-06-07 PROCEDURE — 84100 ASSAY OF PHOSPHORUS: CPT

## 2021-06-07 PROCEDURE — 85025 COMPLETE CBC W/AUTO DIFF WBC: CPT

## 2021-06-07 PROCEDURE — 700102 HCHG RX REV CODE 250 W/ 637 OVERRIDE(OP): Performed by: INTERNAL MEDICINE

## 2021-06-07 PROCEDURE — 700111 HCHG RX REV CODE 636 W/ 250 OVERRIDE (IP): Performed by: SURGERY

## 2021-06-07 PROCEDURE — 302146: Performed by: INTERNAL MEDICINE

## 2021-06-07 PROCEDURE — 700111 HCHG RX REV CODE 636 W/ 250 OVERRIDE (IP): Performed by: INTERNAL MEDICINE

## 2021-06-07 PROCEDURE — 80053 COMPREHEN METABOLIC PANEL: CPT

## 2021-06-07 PROCEDURE — 700102 HCHG RX REV CODE 250 W/ 637 OVERRIDE(OP): Performed by: FAMILY MEDICINE

## 2021-06-07 PROCEDURE — 82043 UR ALBUMIN QUANTITATIVE: CPT

## 2021-06-07 PROCEDURE — A9270 NON-COVERED ITEM OR SERVICE: HCPCS | Performed by: HOSPITALIST

## 2021-06-07 PROCEDURE — 700102 HCHG RX REV CODE 250 W/ 637 OVERRIDE(OP): Performed by: SURGERY

## 2021-06-07 PROCEDURE — 91303 HCHG RX REV CODE 636 W/ 250 OVERRIDE (IP): CPT | Performed by: INTERNAL MEDICINE

## 2021-06-07 PROCEDURE — 700102 HCHG RX REV CODE 250 W/ 637 OVERRIDE(OP): Performed by: HOSPITALIST

## 2021-06-07 PROCEDURE — 770020 HCHG ROOM/CARE - TELE (206)

## 2021-06-07 RX ORDER — MAGNESIUM SULFATE HEPTAHYDRATE 40 MG/ML
2 INJECTION, SOLUTION INTRAVENOUS ONCE
Status: COMPLETED | OUTPATIENT
Start: 2021-06-07 | End: 2021-06-07

## 2021-06-07 RX ORDER — CIPROFLOXACIN 500 MG/1
500 TABLET, FILM COATED ORAL EVERY 12 HOURS
Status: CANCELLED | OUTPATIENT
Start: 2021-06-07 | End: 2021-06-10

## 2021-06-07 RX ORDER — CIPROFLOXACIN 500 MG/1
500 TABLET, FILM COATED ORAL EVERY 12 HOURS
Status: COMPLETED | OUTPATIENT
Start: 2021-06-07 | End: 2021-06-14

## 2021-06-07 RX ORDER — AMIODARONE HYDROCHLORIDE 200 MG/1
200 TABLET ORAL TWICE DAILY
Status: DISCONTINUED | OUTPATIENT
Start: 2021-06-07 | End: 2021-07-04

## 2021-06-07 RX ORDER — METRONIDAZOLE 500 MG/1
500 TABLET ORAL EVERY 8 HOURS
Status: COMPLETED | OUTPATIENT
Start: 2021-06-07 | End: 2021-06-14

## 2021-06-07 RX ORDER — SODIUM CHLORIDE AND POTASSIUM CHLORIDE 300; 900 MG/100ML; MG/100ML
INJECTION, SOLUTION INTRAVENOUS CONTINUOUS
Status: DISCONTINUED | OUTPATIENT
Start: 2021-06-07 | End: 2021-06-07

## 2021-06-07 RX ORDER — POTASSIUM CHLORIDE 20 MEQ/1
40 TABLET, EXTENDED RELEASE ORAL ONCE
Status: COMPLETED | OUTPATIENT
Start: 2021-06-07 | End: 2021-06-07

## 2021-06-07 RX ADMIN — POTASSIUM CHLORIDE 40 MEQ: 1500 TABLET, EXTENDED RELEASE ORAL at 13:40

## 2021-06-07 RX ADMIN — METRONIDAZOLE 500 MG: 500 TABLET ORAL at 21:49

## 2021-06-07 RX ADMIN — LEVOTHYROXINE SODIUM 125 MCG: 0.12 TABLET ORAL at 04:58

## 2021-06-07 RX ADMIN — ZIPRASIDONE HYDROCHLORIDE 60 MG: 60 CAPSULE ORAL at 08:33

## 2021-06-07 RX ADMIN — CIPROFLOXACIN 400 MG: 2 INJECTION, SOLUTION INTRAVENOUS at 06:16

## 2021-06-07 RX ADMIN — MAGNESIUM SULFATE 2 G: 2 INJECTION INTRAVENOUS at 16:36

## 2021-06-07 RX ADMIN — HYDROCORTISONE 10 MG: 10 TABLET ORAL at 04:58

## 2021-06-07 RX ADMIN — PAROXETINE HYDROCHLORIDE 20 MG: 20 TABLET, FILM COATED ORAL at 08:34

## 2021-06-07 RX ADMIN — CIPROFLOXACIN HYDROCHLORIDE 500 MG: 500 TABLET, FILM COATED ORAL at 18:16

## 2021-06-07 RX ADMIN — CLONAZEPAM 0.5 MG: 0.5 TABLET ORAL at 08:34

## 2021-06-07 RX ADMIN — DOXYCYCLINE 100 MG: 100 TABLET, FILM COATED ORAL at 18:16

## 2021-06-07 RX ADMIN — ZIPRASIDONE HYDROCHLORIDE 60 MG: 60 CAPSULE ORAL at 21:49

## 2021-06-07 RX ADMIN — ATORVASTATIN CALCIUM 40 MG: 40 TABLET, FILM COATED ORAL at 18:17

## 2021-06-07 RX ADMIN — AMIODARONE HYDROCHLORIDE 200 MG: 200 TABLET ORAL at 16:35

## 2021-06-07 RX ADMIN — Medication 1000 UNITS: at 04:58

## 2021-06-07 RX ADMIN — OXYCODONE 5 MG: 5 TABLET ORAL at 13:35

## 2021-06-07 RX ADMIN — APIXABAN 5 MG: 5 TABLET, FILM COATED ORAL at 04:58

## 2021-06-07 RX ADMIN — POTASSIUM CHLORIDE 40 MEQ: 1500 TABLET, EXTENDED RELEASE ORAL at 08:33

## 2021-06-07 RX ADMIN — CLONAZEPAM 1 MG: 1 TABLET ORAL at 18:17

## 2021-06-07 RX ADMIN — METRONIDAZOLE 500 MG: 500 INJECTION, SOLUTION INTRAVENOUS at 13:35

## 2021-06-07 RX ADMIN — METRONIDAZOLE 500 MG: 500 INJECTION, SOLUTION INTRAVENOUS at 04:58

## 2021-06-07 RX ADMIN — HYDROCORTISONE 5 MG: 10 TABLET ORAL at 16:35

## 2021-06-07 RX ADMIN — APIXABAN 5 MG: 5 TABLET, FILM COATED ORAL at 18:17

## 2021-06-07 RX ADMIN — DOXYCYCLINE 100 MG: 100 TABLET, FILM COATED ORAL at 04:58

## 2021-06-07 RX ADMIN — JNJ-78436735 0.5 ML: 50000000000 SUSPENSION INTRAMUSCULAR at 13:21

## 2021-06-07 ASSESSMENT — ENCOUNTER SYMPTOMS
SENSORY CHANGE: 0
WEAKNESS: 1
POLYPHAGIA: 0
SEIZURES: 0
EYE DISCHARGE: 0
AGITATION: 0
ADENOPATHY: 0
WEAKNESS: 0
VOMITING: 0
HEADACHES: 0
EYE REDNESS: 0
CHILLS: 0
BLURRED VISION: 0
DECREASED CONCENTRATION: 0
ABDOMINAL DISTENTION: 0
ABDOMINAL PAIN: 0
NECK PAIN: 0
SPEECH CHANGE: 0
ACTIVITY CHANGE: 0
NUMBNESS: 0
SORE THROAT: 0
DIARRHEA: 0
SPEECH DIFFICULTY: 0
BRUISES/BLEEDS EASILY: 0
DIZZINESS: 0
CHOKING: 0
WHEEZING: 0
HEARTBURN: 0
POLYDIPSIA: 0
DEPRESSION: 1
CHEST TIGHTNESS: 0
NERVOUS/ANXIOUS: 0
FEVER: 0
STRIDOR: 0
HALLUCINATIONS: 0
JOINT SWELLING: 0
NAUSEA: 0
CONSTIPATION: 0
CONFUSION: 0
MYALGIAS: 0
PALPITATIONS: 0
TREMORS: 0
LIGHT-HEADEDNESS: 0
FLANK PAIN: 0
COLOR CHANGE: 0
DIAPHORESIS: 0
BACK PAIN: 0
COUGH: 0
EYE ITCHING: 0
FOCAL WEAKNESS: 0
SHORTNESS OF BREATH: 0
APPETITE CHANGE: 0
EYE PAIN: 0

## 2021-06-07 ASSESSMENT — PAIN DESCRIPTION - PAIN TYPE
TYPE: ACUTE PAIN

## 2021-06-07 NOTE — CARE PLAN
The patient is watcher.    Shift Goals  Clinical Goals: Normotension, pain control, sleep.   Patient Goals: Denies  Family Goals: n/a    Progress made toward(s) clinical / shift goals: Mildly HOTN this shift. Improved from previous NOC shift. Tylenol effective for pain per patient report. Patient sleeping most of shift.     Patient is not progressing towards the following goals: NA.

## 2021-06-07 NOTE — PROGRESS NOTES
2 RN skin check complete.       Abdominal/perineal edema noted. Scattered bruising/scabbing noted throughout    MLI with wound vac; CDI dressing; staples inferior/underneath dressing. Small circular healing blister. Old ostomy site LLQ; well approximated with dermabond Redness noted to BUE/skin folds-pannus/perineum. BLE flaky shins/calloused feet. Increased BLE swelling noted; left more than right. Right toe healed TMA. Blanching sacrum; red/purple.      The following interventions in place: Waffle overlay. Prophylactic mediplex to elbows/heels; changed. Uma cream applied to skin folds/perineum. Perineal washcloths being utilized. Q2 turns as patient tolerates. Frequent perineal checks d/t loose, watery stool

## 2021-06-07 NOTE — PROGRESS NOTES
Received report from previous shift RN  Assessment complete.  A&O x 4. Patient calls appropriately.  Patient ambulates with MAX assist. Bed alarm on. Q2 turns in place   Patient has 2/10 pain. Pain managed with prescribed medications.  Denies N&V. Tolerating  diet.  Surgical incision with wound vac in place.  Pt has not voided, Bladder scan results 370 per NOC. Repeat scan ordered.    + flatus, 6/7 BM.  Patient denies SOB.  Patient hypotension over night, Metorprolol held this morning per MD. Clarified with MD, she would like the additional K given.   Review plan with of care with patient. Call light and personal belongings with in reach. Hourly rounding in place. All needs met at this time.

## 2021-06-07 NOTE — PROGRESS NOTES
2 RN skin check complete.   Devices in place: NC, Wound vac.       Skin assessed under devices: above.      Abdominal/perineal edema noted. Scattered bruising/scabbing noted throughout. RUE Midline PIV; CDI dressing. Changed. CDI. MLI with wound vac; CDI dressing; staples inferior/underneath dressing. Small circular healing blister. Old ostomy site LLQ; well approximated with dermabond Redness noted to BUE/skin folds-breast/pannus/perineum. BLE flaky shins/calloused feet. Increased BLE swelling noted; left more than right. Right toe healed TMA. Blanching sacrum; red/purple.      Confirmed pressure ulcers; healed.   New potential pressure ulcers noted on: NA.   Wound team following.      The following interventions in place: Waffle overlay. Prophylactic mediplex to elbows/heels; changed. Uma cream applied to skin folds/perineum. Perineal washcloths being utilized. Q2 turns as patient tolerates. Frequent perineal checks d/t loose, watery stool.

## 2021-06-07 NOTE — PROGRESS NOTES
Received report from AM RN; assumed care. A&O x 4. VSS, mild HOTN noted throughout shift. Fluids encouraged. 98% on 2L NC. Patient falls asleep quickly after care provided. Patient denied SOB, nausea, vomiting. Numbness/tingling BLE, baseline. Mild pain reported; mobility encouraged. Patient stated Tylenol effective that was provided by AM RN. Patent didn't have an appetite for lunch/dinner; discarded. - void during NOC shift. Patient approached to make attempt. Bladder scan 371 mL near end of shift.  + eructation. + flatus. LBM 06/07; watery loose stool noted; x 2 loose stools during shift; incontinence noted. Held stool intervention. Patient is max assist with turning while in bed. Patient utilizing rails. HFR; bed alarm on/engaged. Call light/personal belongings within reach. All needs met throughout shift.

## 2021-06-07 NOTE — PROGRESS NOTES
Cardiology Follow Up Progress Note    Date of Service  6/7/2021    Attending Physician  Conor Rodriguez M.D.    Chief Complaint   A-fib with RVR    HPI  Sebastián Castro is a 56 y.o. male admitted 2/9/2021 with a-fib with RVR    Interim Events  Intermittent a-fib  Variable volume overload and hypotension with diuretics and SRUTHI  Albumin low  Borderline anasarca versus edema    Review of Systems  Review of Systems   Constitutional: Negative for activity change, appetite change, chills and diaphoresis.   Eyes: Negative for pain, discharge, redness and itching.   Respiratory: Negative for cough, choking, chest tightness and stridor.    Cardiovascular: Negative for palpitations.   Gastrointestinal: Negative for abdominal distention, abdominal pain, constipation, diarrhea and nausea.   Endocrine: Negative for cold intolerance, heat intolerance, polydipsia and polyphagia.   Genitourinary: Negative for difficulty urinating, dysuria, enuresis, flank pain, frequency, genital sores and hematuria.   Musculoskeletal: Negative for back pain, gait problem, joint swelling and myalgias.   Skin: Negative for color change, pallor and rash.   Neurological: Negative for tremors, seizures, syncope, speech difficulty, weakness, light-headedness, numbness and headaches.   Hematological: Negative for adenopathy. Does not bruise/bleed easily.   Psychiatric/Behavioral: Negative for agitation, behavioral problems, confusion, decreased concentration and hallucinations. The patient is not nervous/anxious.        Vital signs in last 24 hours  Temp:  [36 °C (96.8 °F)-36.2 °C (97.2 °F)] 36 °C (96.8 °F)  Pulse:  [] 96  Resp:  [16-18] 18  BP: ()/(59-71) 109/69  SpO2:  [92 %-100 %] 100 %    Physical Exam  Physical Exam  Vitals and nursing note reviewed.   Constitutional:       General: He is not in acute distress.     Appearance: Normal appearance. He is normal weight. He is not ill-appearing, toxic-appearing or diaphoretic.   HENT:      Head:  Normocephalic and atraumatic.      Right Ear: Ear canal and external ear normal.      Left Ear: Ear canal and external ear normal.      Nose: Nose normal. No congestion or rhinorrhea.      Mouth/Throat:      Mouth: Mucous membranes are moist.      Pharynx: Oropharynx is clear. No oropharyngeal exudate or posterior oropharyngeal erythema.   Eyes:      General: No scleral icterus.        Right eye: No discharge.         Left eye: No discharge.      Extraocular Movements: Extraocular movements intact.      Conjunctiva/sclera: Conjunctivae normal.      Pupils: Pupils are equal, round, and reactive to light.   Neck:      Vascular: No carotid bruit.   Cardiovascular:      Rate and Rhythm: Regular rhythm. Tachycardia present.      Pulses: Normal pulses.      Heart sounds: Normal heart sounds. No murmur heard.   No gallop.    Pulmonary:      Effort: Pulmonary effort is normal. No respiratory distress.      Breath sounds: Normal breath sounds. No stridor. No wheezing, rhonchi or rales.   Abdominal:      General: Abdomen is flat. Bowel sounds are normal. There is no distension.      Palpations: Abdomen is soft. There is no mass.      Tenderness: There is no abdominal tenderness. There is no guarding or rebound.      Hernia: No hernia is present.   Musculoskeletal:         General: No swelling or tenderness. Normal range of motion.      Cervical back: Normal range of motion and neck supple. No rigidity. No muscular tenderness.      Right lower leg: No edema.      Left lower leg: No edema.   Lymphadenopathy:      Cervical: No cervical adenopathy.   Skin:     General: Skin is warm and dry.      Capillary Refill: Capillary refill takes 2 to 3 seconds.      Coloration: Skin is not jaundiced or pale.      Findings: No bruising or lesion.   Neurological:      General: No focal deficit present.      Mental Status: He is alert and oriented to person, place, and time. Mental status is at baseline.      Cranial Nerves: No cranial nerve  deficit.      Motor: No weakness.   Psychiatric:         Mood and Affect: Mood normal.         Behavior: Behavior normal.         Thought Content: Thought content normal.         Judgment: Judgment normal.         Lab Review  Lab Results   Component Value Date/Time    WBC 14.5 (H) 06/07/2021 05:00 AM    RBC 4.32 (L) 06/07/2021 05:00 AM    HEMOGLOBIN 12.4 (L) 06/07/2021 05:00 AM    HEMATOCRIT 38.7 (L) 06/07/2021 05:00 AM    MCV 89.6 06/07/2021 05:00 AM    MCH 28.7 06/07/2021 05:00 AM    MCHC 32.0 (L) 06/07/2021 05:00 AM    MPV 10.1 06/07/2021 05:00 AM      Lab Results   Component Value Date/Time    SODIUM 130 (L) 06/07/2021 05:00 AM    POTASSIUM 3.4 (L) 06/07/2021 05:00 AM    CHLORIDE 97 06/07/2021 05:00 AM    CO2 24 06/07/2021 05:00 AM    GLUCOSE 106 (H) 06/07/2021 05:00 AM    BUN 22 06/07/2021 05:00 AM    CREATININE 2.38 (H) 06/07/2021 05:00 AM      Lab Results   Component Value Date/Time    ASTSGOT 23 06/07/2021 05:00 AM    ALTSGPT 16 06/07/2021 05:00 AM     Lab Results   Component Value Date/Time    CHOLSTRLTOT 146 02/14/2021 01:30 AM    LDL 82 02/14/2021 01:30 AM    HDL 32 (A) 02/14/2021 01:30 AM    TRIGLYCERIDE 160 (H) 02/14/2021 01:30 AM    TROPONINT 54 (H) 02/16/2021 05:52 AM       Recent Labs     06/07/21  0500   NTPROBNP 3007*       Cardiac Imaging and Procedures Review  EKG: personally reviewed and interpreted by myself showing a-fib with RVR      Echo 2/10/21:  CONCLUSIONS  Compared to the images of the study done on 11/11/2020 - there has been   normalization of ejection fraction previously 35%.  Normal left ventricular systolic function.  Left ventricular ejection fraction is visually estimated to be 60%.  Diastolic function is difficult to assess with atrial fibrillation.  Normal right ventricular systolic function.  Mild  mitral regurgitation.     Stress Test 1/29/18:  Baseline ECG:atrial fibrillation  Stress ECG: No ischemic T wave changes with peak stress  Impression: Normal stress ECG, see nuclear  images   Imaging  Chest X-Ray:  NA        Assessment/Plan  No new Assessment & Plan notes have been filed under this hospital service since the last note was generated.  Service: Cardiology  56-year-old male with chronic lower extremity edema atrial fibrillation and hyperlipidemia.  I think that his poor response to diuretics is probably a reflection of anasarca as opposed to over response to diuretics.  I would get a urine albumin and serum albumin level.  For his atrial fibrillation please continue to treat this as you are doing.  I would carefully diurese him but pay special attention to his intravascular volume given his most recent albumin of 2.4 and consider a nephrology consult.    Thank you for allowing me to participate in the care of this patient.  I will continue to follow this patient    Please contact me with any questions.    Presley Camara M.D.   Cardiologist, [facility]  [phone]

## 2021-06-07 NOTE — PROGRESS NOTES
[facility] Medicine Daily Progress Note    Date of Service  6/7/2021    Chief Complaint  56 y.o. male admitted 2/9/2021 with Syncope.    Hospital Course  Admitted with syncope, he was noted to be bradycardic and hypotensive.  He has known history of permanent atrial fibrillation and he was on diltiazem, digoxin and metoprolol.  These medications were held.  Later on he was restarted on low-dose Toprol and Digoxin.  He was also started on anticoagulation with Eliquis.  He also had a possible history of adrenal insufficiency, he was already on Florinef and midodrine.  Midodrine was discontinued and he was started on cortisol. Further work-up showed that he had gram-negative bacteremia as well as urinary tract infection and acute kidney injury.  He was initially placed on IV Zosyn, later changed to oral Bactrim to finish the course.  He was carefully hydrated with IV fluids.  On admission he also expressed suicidal ideation.  Psychiatry was consulted the case, he was placed on a legal hold.  He was noted to have severe depression.  He has been placed on medication the form of Geodon, Paxil, Klonopin.  He has a colostomy in place, which the patient believes contributes to his severe depression because of ostomy care.  He had history of bowel perforation and splenic fluid collection requiring Segmental colectomy, colostomy creation, mobilization of splenic flexure, wound VAC placement, irrigation and debridement of flank wound on 11/10/2020.  Surgery was consulted on the case, and he underwent exploratory laparotomy and colostomy takedown and closure on 5/27/2021.  6/1- restarted antibiotic per surgery team. WBC stable since started on antibiotic per surgery.   6/7- cardiology consulted again for a-fib management, and pt was switched to amiodarone     Interval Problem Update  Colostomy - pain mostly controlled  Suicidal - less thoughts  Afib - rate 100-120  Diabetes -   SRUTHI - crea trended down to 0.8  Low potassium  and magnesium    6/1- pt denies pain, or other concerns. WBC increasing. Antibiotic was started per surgery team who is concern for infection. CT abdomen ?? Still pending legal hold eval   6/2- pt continues to have tenderness on his surgical site. WBC improving on antibiotic. No need for CT scan. Legal hold pending. Wound vac per surgery.   6/3- wound vac placed today. He complains of some abdominal soreness. No other event. [facility] with [facility]. Continue care inpatient. WBC stable. Still sometimes tachycardic. Continue to  Monitor. Discussed with surgery, eliquis restarted   6/4- feeling well. No abdominal pain. Wound vac in place. Legal hold discontinued     6/5- pt is feeling well overall. He has no problem with wound vac. No signs of active infection around the wound vac. Pictures reviewed and wound is healing. WBC is increasing though. He remain tachycardic. His wound is quite extensive still. He did miss one dose of cipro yesterday however his WBC was increasing even him being on antibiotic. He is afebrile. Resume IV antibiotic for now. He might need longer suppression antibiotic therapy since he is on steroid, and poor healing due to his body habitus., I will add doxycycline too to add MRSA coverage. I will add lasix IV since pt still look some volume overload. I will discontinue florinef for now. That will contribute on water retention and it is a corticosteroid. He is already on hydrocortisone, no need for two steroids at this time. I will hold on CT scan and consider if clinically worsen or new findings. Goal to reduce hydrocortisone to 5 mg BID next week.    6/6- pt feeling tired today. IV one dose lasix given. Later he did have a-fib sustain to 120.  then he became hypotensive and lethargic due to lasix and a-fib with RVR. He did receive 1L of IVF and his mentation, vitals improved. Discontinue lasix. Increase metoprolol. Mild SRUTHI due to lasix. WBC stable. Continue  current antibiotic. Continue to monitor     6/7- feeling tired today again. Report from RN noted loose stool. Pt tells me that he has often loose stool. Monitor for c-diff. Hold miralax. Still soft hypotension. BB held. SRUTHI still present. BNP elevated. Hold on fluid at this time. Cardiology consulted and pt will be started on amiodarone. Stop BB and digoxin for now. Continue tele monitoring for now. Protein/Cr ratio to follow up per cardiology recs. Previous UA negative for proteinuria     Consultants/Specialty  Cardiology  Psychiatry  Surgery    Code Status  Full Code    Disposition  Legal Hold  SNF vs Inpatient psychiatry vs Group home    Review of Systems  Review of Systems   Constitutional: Positive for malaise/fatigue. Negative for chills, diaphoresis and fever.   HENT: Negative for congestion, hearing loss and sore throat.    Eyes: Negative for blurred vision.   Respiratory: Negative for cough, shortness of breath and wheezing.    Cardiovascular: Negative for chest pain, palpitations and leg swelling.   Gastrointestinal: Negative for abdominal pain, constipation, diarrhea, heartburn, nausea and vomiting.   Genitourinary: Negative for dysuria, flank pain and hematuria.   Musculoskeletal: Negative for back pain, joint pain, myalgias and neck pain.   Skin: Negative for rash.   Neurological: Positive for weakness. Negative for dizziness, sensory change, speech change, focal weakness and headaches.   Psychiatric/Behavioral: Positive for depression. Negative for hallucinations and suicidal ideas. The patient is not nervous/anxious.       Physical Exam  Temp:  [36 °C (96.8 °F)-36.2 °C (97.2 °F)] 36 °C (96.8 °F)  Pulse:  [] 96  Resp:  [16-18] 18  BP: ()/(59-71) 109/69  SpO2:  [92 %-100 %] 100 %    Physical Exam  Vitals and nursing note reviewed.   Constitutional:       Appearance: He is obese.   HENT:      Head: Normocephalic and atraumatic.      Nose: No congestion.      Mouth/Throat:      Mouth: Mucous  membranes are moist.   Eyes:      Extraocular Movements: Extraocular movements intact.      Conjunctiva/sclera: Conjunctivae normal.      Pupils: Pupils are equal, round, and reactive to light.   Cardiovascular:      Rate and Rhythm: Tachycardia present. Rhythm irregular.   Pulmonary:      Effort: Pulmonary effort is normal.      Breath sounds: Normal breath sounds.   Abdominal:      General: There is no distension.      Tenderness: There is no abdominal tenderness. There is no guarding or rebound.      Comments: Wound vac in place    Musculoskeletal:      Cervical back: No muscular tenderness.      Right lower leg: No edema.      Left lower leg: No edema.   Skin:     General: Skin is warm and dry.   Neurological:      General: No focal deficit present.      Mental Status: He is alert and oriented to person, place, and time.      Cranial Nerves: No cranial nerve deficit.   Psychiatric:         Mood and Affect: Mood is not anxious.         Speech: Speech normal.         Thought Content: Thought content is not paranoid or delusional. Thought content does not include homicidal or suicidal ideation. Thought content does not include homicidal or suicidal plan.         Judgment: Judgment is not impulsive.       Fluids    Intake/Output Summary (Last 24 hours) at 6/7/2021 1507  Last data filed at 6/7/2021 1400  Gross per 24 hour   Intake 780 ml   Output 150 ml   Net 630 ml       Laboratory  Recent Labs     06/05/21  0633 06/06/21  0602 06/07/21  0500   WBC 17.4* 17.8* 14.5*   RBC 5.22 4.63* 4.32*   HEMOGLOBIN 15.0 13.2* 12.4*   HEMATOCRIT 46.9 42.6 38.7*   MCV 89.8 92.0 89.6   MCH 28.7 28.5 28.7   MCHC 32.0* 31.0* 32.0*   RDW 49.3 51.0* 48.8   PLATELETCT 377 357 328   MPV 10.2 10.9 10.1     Recent Labs     06/05/21  0633 06/06/21  0602 06/07/21  0500   SODIUM 141 138 130*   POTASSIUM 3.5* 3.8 3.4*   CHLORIDE 104 102 97   CO2 25 25 24   GLUCOSE 158* 128* 106*   BUN 14 18 22   CREATININE 1.14 2.28* 2.38*   CALCIUM 8.6 8.9 8.7                    Imaging  IR-MIDLINE CATHETER INSERTION WO GUIDANCE > AGE 3   Final Result                  Ultrasound-guided midline placement performed by qualified nursing staff    as above.          EC-ECHOCARDIOGRAM COMPLETE W/ CONT   Final Result      DX-CHEST-PORTABLE (1 VIEW)   Final Result      1.  Minor asymmetric interstitial opacity in the right lower lobe which could represent minor interstitial edema, pneumonia, or fibrotic change.      2.  No left lung consolidation.      3.  No evidence of congestive failure.      [id number] VIEW   Final Result         No specific finding to suggest small bowel obstruction.      SM-UDVULLO-0 VIEW   Final Result         No significant interval change.      DX-CHEST-LIMITED (1 VIEW)   Final Result         Diffuse interstitial prominence could relate to mild pulmonary edema or atypical infection      [id number] VIEW   Final Result      Increased colonic gas without definite bowel obstruction.      US-RENAL   Final Result      No evidence of hydronephrosis.      Lobulated kidneys bilaterally.      CT-ABDOMEN-PELVIS WITH   Final Result      1.  There is no evidence of small bowel obstruction.   2.  There is a left lower quadrant ostomy.   3.  There is fluid distention of the colon distal to the surgical material in the left mid descending colon extending all the way to the rectum. There is no pneumatosis or free air.   4.  There is cholelithiasis without biliary dilatation.   5.  There has been interval removal of the drainage catheter anterior to the spleen with minimal hypodense area in the anterior spleen again noted. There is no new fluid collection.      IR-US GUIDED PIV   Final Result    Ultrasound-guided PERIPHERAL IV INSERTION performed by    qualified nursing staff as above.      EC-ECHOCARDIOGRAM COMPLETE W/O CONT   Final Result      DX-LUMBAR SPINE-2 OR 3 VIEWS   Final Result      Moderate compression deformity of T11 is new compared to 2018.      Mild  wedge deformity of T12 is unchanged.      Degenerative changes including facet arthropathy.      Mild retrolisthesis of L5 on S1 and L3 on L4.              Assessment/Plan  Leukocytosis- (present on admission)  Assessment & Plan  Follow cbc  6/4- still persistent. Continue to monitor   6/5- SIRS positive. WBC increasing . No clear source of infection. Continue antibiotic cipro/flagyl/doxycycline   6/6- stable   6/7- improving with antibiotic     Obstructive uropathy- (present on admission)  Assessment & Plan  Bladder scan and straight cath prn    Permanent atrial fibrillation (HCC)- (present on admission)  Assessment & Plan  6/7- a-fib persistent. Cardiology consulted again to start amiodarone   Stop digoxin high risk toxicity with SRUTHI   Stop metoprolol since hypotensive   Tele monitoring  Continue eliquis     Chronic venous insufficiency of lower extremity- (present on admission)  Assessment & Plan  compression stockings.    Chronic heart failure with preserved ejection fraction (HCC)- (present on admission)  Assessment & Plan  Toprol  follow probnp  6/7- cardiology will follow. BNP elevated. Cannot give lasix with SRUTHI     Type 2 diabetes mellitus with hyperglycemia, with long-term current use of insulin (HCC)- (present on admission)  Assessment & Plan  Follow bmp  Currently not requiring Insulin    Major depressive disorder, recurrent, severe with psychotic features (HCC)- (present on admission)  Assessment & Plan  Geodon, Paxil, Klonopin  Psychiatry following     Colostomy present (HCC)- (present on admission)  Assessment & Plan  Exploratory laparotomy with colostomy takedown and closure 5/27/2021  Segmental colectomy, colostomy creation, mobilization of splenic flexure, wound VAC placement, irrigation and debridement of flank wound on 11/10/2020.  6/1- abdominal wound pictures reviewed. Review recs from surgery. Started on antibiotic. Continue to monitor   6/2- discuss with surgery. Continue oral antibiotic. WBC  improving. Wound vac per surgery.   6/3- wound vac placed. Continue wound care, antibiotic per surgery recs. Continue to monitor   6/5- pt tachycardic, WBC continues to increase. Resume antibiotic. Possible pt will need suppression antibiotic therapy since his wound is high risk for infection due to him being on steroid, body habitus. No other source of infection at this time.   Cipro/flagyl/doxycycline until 6/14 6/6- wbc stable. Continue to monitor. Still slight tachycardic   6/7- WBC improving. Continue current antibiotic       Hypothyroidism- (present on admission)  Assessment & Plan  Increased Levothyroxine to 125 mcg  Check TSH on 6/29/2021    SRUTHI (acute kidney injury) (HCC)- (present on admission)  Assessment & Plan  6/1- resolved   6/6- again pt has mild SRUTHI most likely from lasix. Continue to monitor. Avoid nephrotoxic meds   6/7- still not improving. Cannot give fluid at this time. Still slight volume overload. Switch to amiodarone. Continue to monitor     Adrenal insufficiency (HCC)- (present on admission)  Assessment & Plan  Possible?  Goal to reduce corticef 5 mg BID 6/12  And then corticef 5 mg daily on 7/4  He need endo chronology evaluation     Debility- (present on admission)  Assessment & Plan  PT and OT   vit d level low 5/29- start replacement     Suicidal ideation- (present on admission)  Assessment & Plan  6/4- legal hold discontinued.   Possible SNF placement     Orthostatic hypotension- (present on admission)  Assessment & Plan  Stop florinef. Consider midodrine once heart rate improving   Florinef increase risk of water retention, poor wound healing and he is already on hydrocortisone   Stop metoprolol or other BP meds      Mixed hyperlipidemia- (present on admission)  Assessment & Plan  Atorvastatin.     Lower limb amputation, great toe (HCC)- (present on admission)  Assessment & Plan  Monitor  No signs of infection     Hypomagnesemia- (present on admission)  Assessment & Plan  6/7-  replace again,. borderline low. Ongoing diarrhea     Class 2 severe obesity with serious comorbidity and body mass index (BMI) of 35.0 to 35.9 in adult (HCC)- (present on admission)  Assessment & Plan  Body mass index is 35.19 kg/m².    Hypokalemia- (present on admission)  Assessment & Plan  Start Kdur  follow bmp  Replace properly        VTE prophylaxis: eliquis

## 2021-06-08 ENCOUNTER — APPOINTMENT (OUTPATIENT)
Dept: RADIOLOGY | Facility: MEDICAL CENTER | Age: 56
DRG: 981 | End: 2021-06-08
Attending: INTERNAL MEDICINE
Payer: MEDICAID

## 2021-06-08 LAB
ALBUMIN SERPL BCP-MCNC: 2.4 G/DL (ref 3.2–4.9)
ALBUMIN/GLOB SERPL: 0.7 G/DL
ALP SERPL-CCNC: 103 U/L (ref 30–99)
ALT SERPL-CCNC: 11 U/L (ref 2–50)
ANION GAP SERPL CALC-SCNC: 8 MMOL/L (ref 7–16)
APPEARANCE UR: CLEAR
AST SERPL-CCNC: 26 U/L (ref 12–45)
BASOPHILS # BLD AUTO: 0.6 % (ref 0–1.8)
BASOPHILS # BLD: 0.07 K/UL (ref 0–0.12)
BILIRUB SERPL-MCNC: 0.4 MG/DL (ref 0.1–1.5)
BILIRUB UR QL STRIP.AUTO: NEGATIVE
BUN SERPL-MCNC: 22 MG/DL (ref 8–22)
CALCIUM SERPL-MCNC: 8.3 MG/DL (ref 8.5–10.5)
CHLORIDE SERPL-SCNC: 106 MMOL/L (ref 96–112)
CO2 SERPL-SCNC: 21 MMOL/L (ref 20–33)
COLOR UR: YELLOW
CREAT SERPL-MCNC: 2.05 MG/DL (ref 0.5–1.4)
CREAT UR-MCNC: 97.34 MG/DL
EOSINOPHIL # BLD AUTO: 0.58 K/UL (ref 0–0.51)
EOSINOPHIL NFR BLD: 4.6 % (ref 0–6.9)
ERYTHROCYTE [DISTWIDTH] IN BLOOD BY AUTOMATED COUNT: 49.8 FL (ref 35.9–50)
GLOBULIN SER CALC-MCNC: 3.3 G/DL (ref 1.9–3.5)
GLUCOSE SERPL-MCNC: 111 MG/DL (ref 65–99)
GLUCOSE UR STRIP.AUTO-MCNC: NEGATIVE MG/DL
HCT VFR BLD AUTO: 40.5 % (ref 42–52)
HGB BLD-MCNC: 12.6 G/DL (ref 14–18)
IMM GRANULOCYTES # BLD AUTO: 0.12 K/UL (ref 0–0.11)
IMM GRANULOCYTES NFR BLD AUTO: 1 % (ref 0–0.9)
KETONES UR STRIP.AUTO-MCNC: NEGATIVE MG/DL
LEUKOCYTE ESTERASE UR QL STRIP.AUTO: NEGATIVE
LYMPHOCYTES # BLD AUTO: 2.06 K/UL (ref 1–4.8)
LYMPHOCYTES NFR BLD: 16.5 % (ref 22–41)
MCH RBC QN AUTO: 28.3 PG (ref 27–33)
MCHC RBC AUTO-ENTMCNC: 31.1 G/DL (ref 33.7–35.3)
MCV RBC AUTO: 91 FL (ref 81.4–97.8)
MICRO URNS: NORMAL
MONOCYTES # BLD AUTO: 0.89 K/UL (ref 0–0.85)
MONOCYTES NFR BLD AUTO: 7.1 % (ref 0–13.4)
NEUTROPHILS # BLD AUTO: 8.79 K/UL (ref 1.82–7.42)
NEUTROPHILS NFR BLD: 70.2 % (ref 44–72)
NITRITE UR QL STRIP.AUTO: NEGATIVE
NRBC # BLD AUTO: 0 K/UL
NRBC BLD-RTO: 0 /100 WBC
PH UR STRIP.AUTO: 5 [PH] (ref 5–8)
PLATELET # BLD AUTO: 312 K/UL (ref 164–446)
PMV BLD AUTO: 10.4 FL (ref 9–12.9)
POTASSIUM SERPL-SCNC: 3.5 MMOL/L (ref 3.6–5.5)
PROT SERPL-MCNC: 5.7 G/DL (ref 6–8.2)
PROT UR QL STRIP: NEGATIVE MG/DL
RBC # BLD AUTO: 4.45 M/UL (ref 4.7–6.1)
RBC UR QL AUTO: NEGATIVE
SODIUM SERPL-SCNC: 135 MMOL/L (ref 135–145)
SODIUM UR-SCNC: 21 MMOL/L
SP GR UR STRIP.AUTO: 1.01
UROBILINOGEN UR STRIP.AUTO-MCNC: 0.2 MG/DL
WBC # BLD AUTO: 12.5 K/UL (ref 4.8–10.8)

## 2021-06-08 PROCEDURE — A9270 NON-COVERED ITEM OR SERVICE: HCPCS | Performed by: NURSE PRACTITIONER

## 2021-06-08 PROCEDURE — 700102 HCHG RX REV CODE 250 W/ 637 OVERRIDE(OP): Performed by: STUDENT IN AN ORGANIZED HEALTH CARE EDUCATION/TRAINING PROGRAM

## 2021-06-08 PROCEDURE — 99233 SBSQ HOSP IP/OBS HIGH 50: CPT | Performed by: INTERNAL MEDICINE

## 2021-06-08 PROCEDURE — 84300 ASSAY OF URINE SODIUM: CPT

## 2021-06-08 PROCEDURE — 700102 HCHG RX REV CODE 250 W/ 637 OVERRIDE(OP): Performed by: NURSE PRACTITIONER

## 2021-06-08 PROCEDURE — A9270 NON-COVERED ITEM OR SERVICE: HCPCS | Performed by: INTERNAL MEDICINE

## 2021-06-08 PROCEDURE — 700102 HCHG RX REV CODE 250 W/ 637 OVERRIDE(OP): Performed by: SURGERY

## 2021-06-08 PROCEDURE — A9270 NON-COVERED ITEM OR SERVICE: HCPCS | Performed by: FAMILY MEDICINE

## 2021-06-08 PROCEDURE — 700105 HCHG RX REV CODE 258: Performed by: INTERNAL MEDICINE

## 2021-06-08 PROCEDURE — 80053 COMPREHEN METABOLIC PANEL: CPT

## 2021-06-08 PROCEDURE — 36415 COLL VENOUS BLD VENIPUNCTURE: CPT

## 2021-06-08 PROCEDURE — 700102 HCHG RX REV CODE 250 W/ 637 OVERRIDE(OP): Performed by: FAMILY MEDICINE

## 2021-06-08 PROCEDURE — A9270 NON-COVERED ITEM OR SERVICE: HCPCS | Performed by: STUDENT IN AN ORGANIZED HEALTH CARE EDUCATION/TRAINING PROGRAM

## 2021-06-08 PROCEDURE — 700102 HCHG RX REV CODE 250 W/ 637 OVERRIDE(OP): Performed by: INTERNAL MEDICINE

## 2021-06-08 PROCEDURE — 76775 US EXAM ABDO BACK WALL LIM: CPT

## 2021-06-08 PROCEDURE — 82570 ASSAY OF URINE CREATININE: CPT

## 2021-06-08 PROCEDURE — 85025 COMPLETE CBC W/AUTO DIFF WBC: CPT

## 2021-06-08 PROCEDURE — 770020 HCHG ROOM/CARE - TELE (206)

## 2021-06-08 PROCEDURE — A9270 NON-COVERED ITEM OR SERVICE: HCPCS | Performed by: HOSPITALIST

## 2021-06-08 PROCEDURE — 81003 URINALYSIS AUTO W/O SCOPE: CPT

## 2021-06-08 PROCEDURE — A9270 NON-COVERED ITEM OR SERVICE: HCPCS | Performed by: SURGERY

## 2021-06-08 PROCEDURE — 700102 HCHG RX REV CODE 250 W/ 637 OVERRIDE(OP): Performed by: HOSPITALIST

## 2021-06-08 RX ORDER — LORAZEPAM 1 MG/1
0.5 TABLET ORAL 2 TIMES DAILY PRN
Status: DISCONTINUED | OUTPATIENT
Start: 2021-06-08 | End: 2021-06-11

## 2021-06-08 RX ORDER — SODIUM CHLORIDE 9 MG/ML
INJECTION, SOLUTION INTRAVENOUS CONTINUOUS
Status: DISCONTINUED | OUTPATIENT
Start: 2021-06-08 | End: 2021-06-11

## 2021-06-08 RX ORDER — HYDROMORPHONE HYDROCHLORIDE 1 MG/ML
0.5 INJECTION, SOLUTION INTRAMUSCULAR; INTRAVENOUS; SUBCUTANEOUS 2 TIMES DAILY PRN
Status: DISCONTINUED | OUTPATIENT
Start: 2021-06-08 | End: 2021-07-09 | Stop reason: HOSPADM

## 2021-06-08 RX ADMIN — HYDROCORTISONE 5 MG: 10 TABLET ORAL at 14:40

## 2021-06-08 RX ADMIN — CIPROFLOXACIN HYDROCHLORIDE 500 MG: 500 TABLET, FILM COATED ORAL at 06:43

## 2021-06-08 RX ADMIN — CLONAZEPAM 1 MG: 1 TABLET ORAL at 17:04

## 2021-06-08 RX ADMIN — METRONIDAZOLE 500 MG: 500 TABLET ORAL at 22:06

## 2021-06-08 RX ADMIN — AMIODARONE HYDROCHLORIDE 200 MG: 200 TABLET ORAL at 17:04

## 2021-06-08 RX ADMIN — SODIUM CHLORIDE: 9 INJECTION, SOLUTION INTRAVENOUS at 11:50

## 2021-06-08 RX ADMIN — CIPROFLOXACIN HYDROCHLORIDE 500 MG: 500 TABLET, FILM COATED ORAL at 17:03

## 2021-06-08 RX ADMIN — CLONAZEPAM 0.5 MG: 0.5 TABLET ORAL at 08:16

## 2021-06-08 RX ADMIN — APIXABAN 5 MG: 5 TABLET, FILM COATED ORAL at 17:03

## 2021-06-08 RX ADMIN — ZIPRASIDONE HYDROCHLORIDE 60 MG: 60 CAPSULE ORAL at 22:06

## 2021-06-08 RX ADMIN — AMIODARONE HYDROCHLORIDE 200 MG: 200 TABLET ORAL at 06:43

## 2021-06-08 RX ADMIN — Medication 1000 UNITS: at 06:43

## 2021-06-08 RX ADMIN — HYDROCORTISONE 10 MG: 10 TABLET ORAL at 06:43

## 2021-06-08 RX ADMIN — POTASSIUM CHLORIDE 40 MEQ: 1500 TABLET, EXTENDED RELEASE ORAL at 06:43

## 2021-06-08 RX ADMIN — OXYCODONE 5 MG: 5 TABLET ORAL at 22:30

## 2021-06-08 RX ADMIN — LEVOTHYROXINE SODIUM 125 MCG: 0.12 TABLET ORAL at 06:43

## 2021-06-08 RX ADMIN — ZIPRASIDONE HYDROCHLORIDE 60 MG: 60 CAPSULE ORAL at 08:15

## 2021-06-08 RX ADMIN — DOXYCYCLINE 100 MG: 100 TABLET, FILM COATED ORAL at 06:42

## 2021-06-08 RX ADMIN — PAROXETINE HYDROCHLORIDE 20 MG: 20 TABLET, FILM COATED ORAL at 08:15

## 2021-06-08 RX ADMIN — OXYCODONE 10 MG: 5 TABLET ORAL at 17:03

## 2021-06-08 RX ADMIN — ATORVASTATIN CALCIUM 40 MG: 40 TABLET, FILM COATED ORAL at 17:04

## 2021-06-08 RX ADMIN — APIXABAN 5 MG: 5 TABLET, FILM COATED ORAL at 06:43

## 2021-06-08 RX ADMIN — METRONIDAZOLE 500 MG: 500 TABLET ORAL at 06:43

## 2021-06-08 RX ADMIN — METRONIDAZOLE 500 MG: 500 TABLET ORAL at 14:40

## 2021-06-08 RX ADMIN — DOXYCYCLINE 100 MG: 100 TABLET, FILM COATED ORAL at 17:03

## 2021-06-08 ASSESSMENT — ENCOUNTER SYMPTOMS
BACK PAIN: 0
NAUSEA: 0
BLURRED VISION: 0
FOCAL WEAKNESS: 0
DIAPHORESIS: 0
CONSTIPATION: 0
FEVER: 0
NERVOUS/ANXIOUS: 0
ABDOMINAL PAIN: 0
PALPITATIONS: 0
DEPRESSION: 0
CHILLS: 0
MYALGIAS: 0
VOMITING: 0
COUGH: 0
SORE THROAT: 0
NECK PAIN: 0
SPEECH CHANGE: 0
FLANK PAIN: 0
HALLUCINATIONS: 0
SHORTNESS OF BREATH: 0
SENSORY CHANGE: 0
WEAKNESS: 1
HEARTBURN: 0
HEADACHES: 0
WHEEZING: 0
DIZZINESS: 0
DIARRHEA: 0

## 2021-06-08 ASSESSMENT — PAIN DESCRIPTION - PAIN TYPE
TYPE: ACUTE PAIN

## 2021-06-08 NOTE — PROGRESS NOTES
Assumed care of pt this am. Pt is A&O x4. Denies pain this morning. Pt oxygen saturation was 94% on room air. Pt encouraged to continue to use IS.  machine in use. Pt is Q2h turns, currently on a bariatric DARYL mattress. Hourly rounding in place.

## 2021-06-08 NOTE — WOUND TEAM
[facility]  Inpatient Services  Wound and Skin Care Progress Note    Admission Date: 2/9/2021     Last order of IP CONSULT TO WOUND CARE was found on 3/18/2021 from Hospital Encounter on 2/9/2021     HPI, PMH, SH: Reviewed    Past Surgical History:   Procedure Laterality Date   • PB EXPLORATORY OF ABDOMEN  5/27/2021    Procedure: LAPAROTOMY, EXPLORATORY;  Surgeon: Kin Desouza D.O.;  Location: [facility];  Service: General   • COLOSTOMY TAKEDOWN  5/27/2021    Procedure: COLOSTOMY TAKEDOWN AND CLOSURE;  Surgeon: Kin Desouza D.O.;  Location: SURGERY [facility];  Service: General   • COLECTOMY N/A 11/10/2020    Procedure: COLECTOMY-SEGMENTAL, COLOSTOMY, MOBILIZATION OF SPLENIC FLEXURE, WOUND VAC PLACEMENT, IRRIGATION AND DEBRIDMENT FLANK WOUND;  Surgeon: Kin Desouza D.O.;  Location: [facility];  Service: General   • ZZZ CARDIAC CATH  07/21/2018    EF 45%, normal coronaries.   • IRRIGATION & DEBRIDEMENT ORTHO Right 2/19/2018    Procedure: IRRIGATION & DEBRIDEMENT ORTHO-FOOT;  Surgeon: Kirby Lopez M.D.;  Location: [facility];  Service: Orthopedics   • TOE AMPUTATION Right 1/17/2018    Procedure: TOE AMPUTATION-1ST RAY;  Surgeon: Doug Delong M.D.;  Location: [facility];  Service: Orthopedics   • TOE AMPUTATION Right 11/10/2017    Procedure: TOE AMPUTATION;  Surgeon: Osorio Leo M.D.;  Location: [facility];  Service: Orthopedics     Social History     Tobacco Use   • Smoking status: Never Smoker   • Smokeless tobacco: Never Used   Substance Use Topics   • Alcohol use: No     Chief Complaint   Patient presents with   • Syncope     X1 this morning when standing up to go to bathroom     Diagnosis: Syncope and collapse [R55]  Suicidal ideation [R45.851]  Sepsis due to Klebsiella pneumoniae (HCC) [A41.4]    Unit where seen by Wound Team: T418/00     WOUND CONSULT/FOLLOW UP RELATED TO: abdominal wound    WOUND HISTORY:  Pt is an older  gentleman well known to [facility] wound team 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 Pt currently admitted since 2/9/21 related to SI concerns due to body image issues related to colostomy.      *Pt underwent surgical reversal of colostomy with Dr. Desouza on 5/20/21. MLI was re-opened at that time and was then closed with interrupted staples and packing. Unfortunately MLI continued to Dehisce and therefore wound team was consulted to further manage MLI with dakins packing.     6/3 Wound vac placed    WOUND ASSESSMENT/LDA      Negative Pressure Wound Therapy 06/03/21 Abdomen (Active)   Vacuum Serial Number [id number] 06/04/21 2040   NPWT Pump Mode / Pressure Setting Continuous;125 mmHg    Dressing Type Medium;Black Foam (Regular)    Number of Foam Pieces Used 2    Canister Changed No    NEXT Dressing Change/Treatment Date 06/09/21        Wound 05/27/21 Full Thickness Wound Abdomen (Active)          06/07/21 1550   Site Assessment Red;Yellow    Periwound Assessment Pink;Pale;Fragile    Margins Defined edges;Unattached edges    Closure Secondary intention    Drainage Amount Scant    Drainage Description Serosanguineous    Treatments Cleansed    Wound Cleansing Approved Wound Cleanser    Periwound Protectant Skin Protectant Wipes to Periwound;Drape    Dressing Cleansing/Solutions Not Applicable    Dressing Options Wound Vac    Dressing Changed Changed    Dressing Status Clean;Dry;Intact    Dressing Change/Treatment Frequency Monday, Wednesday, Friday, and As Needed    NEXT Dressing Change/Treatment Date 06/09/21    NEXT Weekly Photo (Inpatient Only) 06/14/21    Non-staged Wound Description Full thickness    Wound Length (cm) 20.3 cm 06/07/21 1550   Wound Width (cm) 7.3 cm 06/07/21 1550   Wound Depth (cm) 2 cm 06/07/21 1550   Wound Surface Area (cm^2) 148.19 cm^2 06/07/21 1550   Wound Volume (cm^3) 296.38 cm^3 06/07/21 1550   Wound Healing % -262 06/07/21 1550   Tunneling (cm) 1 cm 06/07/21 1550    Tunneling Clock Position of Wound 12 06/07/21 1550   Tunneling - 2nd Location (cm) 0.7 cm 06/07/21 1550   Tunneling Clock Position of Wound - 2nd Location 6 06/07/21 1550   Shape irregular    Wound Odor None    Pulses N/A    Exposed Structures None      Vascular:    SHAYAN:   No results found.    Lab Values:    Lab Results   Component Value Date/Time    WBC 14.5 (H) 06/07/2021 05:00 AM    RBC 4.32 (L) 06/07/2021 05:00 AM    HEMOGLOBIN 12.4 (L) 06/07/2021 05:00 AM    HEMATOCRIT 38.7 (L) 06/07/2021 05:00 AM    CREACTPROT 7.52 (H) 11/29/2020 01:25 AM    SEDRATEWES 13 10/23/2018 08:02 AM    HBA1C 5.2 02/14/2021 01:30 AM      Culture Results show:  No results found for this or any previous visit (from the past 720 hour(s)).    Pain Level/Medicated:  IV Morphine immediately prior to dressing change      INTERVENTIONS BY WOUND TEAM:  Chart and images reviewed. Discussed with bedside RN. All areas of concern (based on picture review, LDA review and discussion with bedside RN) have been thoroughly assessed. Documentation of areas based on significant findings. This RN in to assess patient. Performed standard wound care which includes appropriate positioning, dressing removal and non-selective debridement. Pictures and measurements obtained weekly if/when required.              ABD:  Preparation for Dressing removal:  Dressing removed via push/pull.  Cleansed with: Wound cleanser  Sharp debridement: NA  Joya wound: Cleansed with Wound cleanser and gauze, No sting skin prep and drape to periwound mepitel one to distal and proximal staples.   Primary Dressing: black foam to wound bed with small piece placed to proximal aspect of wound since foam a little short  Secondary (Outer) Dressing:  Sealed with drape, TRAC pad applied.     Interdisciplinary consultation: Patient, Bedside RN    EVALUATION / RATIONALE FOR TREATMENT:  Most Recent Date:  6/7: Pt's wound appears to be improving per measurements and pictures. There is  granulation tissue present.   06/5/21: Wound bed improving with increased granulation tissue.  Wound edges approximated prior to drape application.  NPWT continued.    06/03/21:  Fascial sutures intact, wound VAC placed with DR. Desouza.    6/1/21: Wound appears  than yesterdays pictures. Discussed with Dr. Desouza, plan to continue dakins TID for 48hrs, CT scan ordered today. Will re-evaluate on Thursday for potential vac placement.   5/31/21: Wound initially with interrupted staples and packing now with dehiscence. Per Dr. Baeza request wound team initiated dakins packing. Pt may benefit from NPWT to assist with closure.   5/8/21: wound nearly healed. Does not require advanced wound care. Wound team signing off and nursing to re consult if site worsens. Continue with dressing care per order.  4/21/21: abd wound improving. Nursing to continue dsg changes per order.  4/15: Right posterior thigh DTI now resolved. Pink and blanching. Abdominal wound to surface level continuing with dressing POC.  4/7/21: Thigh wound nearly healed and will likely be resolved at next assessment. Abdomen wound appears larger. Continued with Arti to move through inflammatory phase. Hydrofera blue for its bacteriostatic properties and to assist in drainage management.   03/31/21: thigh wound healing well now to a stage 2.  Continue current plan.  All interventions in place.   ABD more denuded and bloody.  Arti to assist in moving wound through inflammatory phase.   3/23/21: Right posterior thigh wound remains a DTI but appears to be evolving into a stage 2. Wound team was previously consulted regarding sacral wound which appeared to be a skin tear however skin now appears to be intact however discolored. Does not appear to be pressure related as pt is on a waffle, self mobile and sacral mepilex is in use. Area is also not a normal pressure injury shape. Wound team will continue to follow.   3/18/21: Pt with linear DTIs to R  posterior thigh from lying on top of frye cath tubing. Offloading precautions ordered. Wound team to continue following.     Goals: Steady decrease in wound area and depth weekly.    WOUND TEAM PLAN OF CARE ([X] for frequency of wound follow up,):   Nursing to follow orders written for wound care. Contact wound team if area fails to progress, deteriorates or with any questions/concerns  Dressing changes by wound team:                   Follow up 3 times weekly:                NPWT change 3 times weekly:   X  Follow up 1-2 times weekly:     Follow up Bi-Monthly:                   Follow up as needed:   Other (explain):     NURSING PLAN OF CARE ORDERS (X):  Dressing changes: See Dressing Care orders: X  Skin care: See Skin Care orders: x  RN Prevention Protocol: x  Rectal tube care: See Rectal Tube Care orders:   Other orders:        Anticipated discharge plans:   LTACH:        SNF/Rehab:                  Home Health Care:           Outpatient Wound Center:            Self/Family Care:        Other:   Group Home

## 2021-06-08 NOTE — PROGRESS NOTES
4 Eyes Skin Assessment Completed by ELISA Weinstein and ELISA Hopper.    Head WDL  Ears WDL  Nose WDL  Mouth WDL  Neck WDL  Breast/Chest WDL  Shoulder Blades WDL  Spine WDL  (R) Arm/Elbow/Hand Redness and Blanching  (L) Arm/Elbow/Hand Redness and Blanching  Abdomen midline wound vac, R mid abdomen old ostomy site dermabond, generalized abdominal bruising   Groin Redness and Blanching  Scrotum/Coccyx/Buttocks Redness blanching  (R) Leg Redness, Blanching, Shiny and Edema  (L) Leg Redness, Blanching, Shiny and Edema  (R) Heel/Foot/Toe Redness and Blanching  (L) Heel/Foot/Toe Redness and Blanching          Devices In Places Tele Box and SCD's      Interventions In Place Heel Mepilex, Waffle Overlay, Pillows, Elbow Mepilex, Q2 Turns, Barrier Cream, Dri-Teja Pads and Heels Loaded W/Pillows    Possible Skin Injury No    Pictures Uploaded Into Epic N/A  Wound Consult Placed N/A  RN Wound Prevention Protocol Ordered Yes

## 2021-06-08 NOTE — CARE PLAN
The patient is Stable - Low risk of patient condition declining or worsening    Shift Goals  Clinical Goals: patient will sit at edge of bed for atleast 2 meals. blood pressure will remain in normal range   Patient Goals: Denies  Family Goals: n/a    Progress made toward(s) clinical / shift goals:  this am. Pt agreeable to edge of bed for lunch.   Problem: Knowledge Deficit - Standard  Goal: Patient and family/care givers will demonstrate understanding of plan of care, disease process/condition, diagnostic tests and medications  Outcome: Progressing     Problem: Psychosocial  Goal: Patient's ability to identify and develop effective coping behaviors will improve  Outcome: Progressing     Problem: Psychosocial  Goal: Patient's ability to re-evaluate and adapt role responsibilities will improve  Outcome: Progressing       Patient is not progressing towards the following goals:

## 2021-06-08 NOTE — PROGRESS NOTES
2 RN skin check complete.   Devices in place: NC, Wound vac.       Skin assessed under devices: above.      Abdominal/perineal/scrotal edema noted. Scattered bruising/scabbing noted throughout. RUE Midline PIV; CDI dressing. MLI with wound vac; CDI dressing; staples inferior/underneath dressing. Small circular nearly healed blister. Old ostomy site LLQ; well approximated with dermabond Redness noted to BUE/skin folds-breast/pannus/perineum. BLE flaky shins/calloused feet. BLE swelling noted; left more than right. Right toe healed TMA. Blanching sacrum; red/purple.      Confirmed pressure ulcers; healed.   New potential pressure ulcers noted on: NA.   Wound team following.      The following interventions in place: Waffle overlay. Prophylactic mediplex to elbows/heels. Uma cream applied to skin folds/perineum. Perineal washcloths being utilized. Q2 turns as patient tolerates. Frequent perineal checks d/t loose, watery stool and intermittent urinary incontinence.

## 2021-06-08 NOTE — PROGRESS NOTES
Received report from AM RN; assumed care. A&O x 4. VSS; normotensive, improved HR noted. Titrated oxygen down to 1L NC, patient tolerating. Patient denies SOB, nausea, vomiting, pain. 5/10 reported for abdominal pain; denied pain medication needs. Numbness/tingling to BLE; baseline. Encouraged increased mobility. Verbalized understanding. Patient reported decreased appetite again this shift. 0% of dinner ingested. Abdomen round, obese; discomfort noted with turning/perineal care. Normoactive BS x 4 noted. + void, sheryl urine noted. Urine sample sent to lab. + erucation. + hiccups. + flatus. LBM 06/07; brown, loose stool noted. Patients hair washed this shift. Patient stated tolerating antibiotics. 2 RN skin check performed; see previous PN. POC discussed. Questions answered. Bed locked/lowest position. Call light/personal belongings within reach. HFR; bed alarm on/engaged. Patient in Bariatric DARYL bed. All needs met/patient sleeping at present time.

## 2021-06-08 NOTE — PROGRESS NOTES
Spiritual Care Note    Patient Information     Patient's Name: Sebastián Castro   MRN: [id number]    YOB: 1965   Age and Gender: 56 y.o. male   Service Area: [facility]   Room (and Bed): [place] [address]   Ethnicity or Nationality:     Primary Language: English   Uatsdin/Spiritual preference: Religion   Place of Residence: [place]   Family/Friends/Others Present: No   Clinical Team Present: No   Medical Diagnosis(-es)/Procedure(s): Syncope   Code Status: Full Code    Date of Admission: 2/9/2021   Length of Stay: 118 days        Spiritual Care Provider Information:  Name of Spiritual Care Provider: Ammy Hess  Title of Spiritual Care Provider: Associate   Phone Number: [phone]  E-mail: [email or web address]  Total time : 10 minutes    Encounter/Request Information  Encounter/Request Type   Visited With: Patient  Nature of the Visit: Follow-up, On shift  General Visit: Yes  Referral From/ Origin of Request: [place] rounds, Verbal patient (Complex Discharge rounds)  Referral To:  (-)    Religous Needs/Values  Uatsdin Needs Visit  Uatsdin Needs: Prayer    Spiritual Assessment     Spiritual Care Encounters    Observations/Symptoms: Accepting, Thankfulness    Interaction/Conversation: Pt requested prayer and thanked the .    Assessment: Need    Need: Seeking Spiritual Assistance and Support    Interventions: Compassionate presence, prayer.    Outcomes: Spiritual Comfort    Plan: Weekly Visits    Notes:

## 2021-06-08 NOTE — CARE PLAN
The patient is watcher, improving stability noted.     Shift Goals  Clinical Goals: Normotension, Q2 turns, increased mobility.   Patient Goals: Denies  Family Goals: n/a    Progress made toward(s) clinical / shift goals: Improved BP/HR noted with medication changes. Patient tolerating PO antibiotics. Voiding this shift. Patient more tolerant of Q2 turns.     Patient is not progressing towards the following goals: NA.

## 2021-06-08 NOTE — CARE PLAN
The patient is Watcher - Medium risk of patient condition declining or worsening    Shift Goals  Clinical Goals: Pain control, Q2 turns   Patient Goals: Denies  Family Goals: n/a    Progress made toward(s) clinical / shift goals:  Pain controlled with PRN medication. Q2 turns in place, pt participates to his ability     Patient is not progressing towards the following goals:

## 2021-06-09 LAB
ANION GAP SERPL CALC-SCNC: 8 MMOL/L (ref 7–16)
BASOPHILS # BLD AUTO: 0.4 % (ref 0–1.8)
BASOPHILS # BLD: 0.05 K/UL (ref 0–0.12)
BUN SERPL-MCNC: 19 MG/DL (ref 8–22)
CALCIUM SERPL-MCNC: 8.4 MG/DL (ref 8.5–10.5)
CHLORIDE SERPL-SCNC: 108 MMOL/L (ref 96–112)
CO2 SERPL-SCNC: 22 MMOL/L (ref 20–33)
CREAT SERPL-MCNC: 1.75 MG/DL (ref 0.5–1.4)
CRP SERPL HS-MCNC: 0.83 MG/DL (ref 0–0.75)
EOSINOPHIL # BLD AUTO: 0.45 K/UL (ref 0–0.51)
EOSINOPHIL NFR BLD: 3.3 % (ref 0–6.9)
ERYTHROCYTE [DISTWIDTH] IN BLOOD BY AUTOMATED COUNT: 50.8 FL (ref 35.9–50)
GLUCOSE SERPL-MCNC: 103 MG/DL (ref 65–99)
HCT VFR BLD AUTO: 40.7 % (ref 42–52)
HGB BLD-MCNC: 12.6 G/DL (ref 14–18)
IMM GRANULOCYTES # BLD AUTO: 0.16 K/UL (ref 0–0.11)
IMM GRANULOCYTES NFR BLD AUTO: 1.2 % (ref 0–0.9)
LYMPHOCYTES # BLD AUTO: 2.48 K/UL (ref 1–4.8)
LYMPHOCYTES NFR BLD: 18.4 % (ref 22–41)
MAGNESIUM SERPL-MCNC: 1.5 MG/DL (ref 1.5–2.5)
MCH RBC QN AUTO: 28.3 PG (ref 27–33)
MCHC RBC AUTO-ENTMCNC: 31 G/DL (ref 33.7–35.3)
MCV RBC AUTO: 91.3 FL (ref 81.4–97.8)
MONOCYTES # BLD AUTO: 0.94 K/UL (ref 0–0.85)
MONOCYTES NFR BLD AUTO: 7 % (ref 0–13.4)
NEUTROPHILS # BLD AUTO: 9.39 K/UL (ref 1.82–7.42)
NEUTROPHILS NFR BLD: 69.7 % (ref 44–72)
NRBC # BLD AUTO: 0 K/UL
NRBC BLD-RTO: 0 /100 WBC
PLATELET # BLD AUTO: 358 K/UL (ref 164–446)
PMV BLD AUTO: 10.6 FL (ref 9–12.9)
POTASSIUM SERPL-SCNC: 3.5 MMOL/L (ref 3.6–5.5)
RBC # BLD AUTO: 4.46 M/UL (ref 4.7–6.1)
SODIUM SERPL-SCNC: 138 MMOL/L (ref 135–145)
WBC # BLD AUTO: 13.5 K/UL (ref 4.8–10.8)

## 2021-06-09 PROCEDURE — 700102 HCHG RX REV CODE 250 W/ 637 OVERRIDE(OP): Performed by: NURSE PRACTITIONER

## 2021-06-09 PROCEDURE — 80048 BASIC METABOLIC PNL TOTAL CA: CPT

## 2021-06-09 PROCEDURE — 85025 COMPLETE CBC W/AUTO DIFF WBC: CPT

## 2021-06-09 PROCEDURE — A9270 NON-COVERED ITEM OR SERVICE: HCPCS | Performed by: NURSE PRACTITIONER

## 2021-06-09 PROCEDURE — 700102 HCHG RX REV CODE 250 W/ 637 OVERRIDE(OP): Performed by: INTERNAL MEDICINE

## 2021-06-09 PROCEDURE — A9270 NON-COVERED ITEM OR SERVICE: HCPCS | Performed by: STUDENT IN AN ORGANIZED HEALTH CARE EDUCATION/TRAINING PROGRAM

## 2021-06-09 PROCEDURE — A9270 NON-COVERED ITEM OR SERVICE: HCPCS | Performed by: FAMILY MEDICINE

## 2021-06-09 PROCEDURE — 83735 ASSAY OF MAGNESIUM: CPT

## 2021-06-09 PROCEDURE — 99232 SBSQ HOSP IP/OBS MODERATE 35: CPT | Performed by: INTERNAL MEDICINE

## 2021-06-09 PROCEDURE — A9270 NON-COVERED ITEM OR SERVICE: HCPCS | Performed by: SURGERY

## 2021-06-09 PROCEDURE — 86140 C-REACTIVE PROTEIN: CPT

## 2021-06-09 PROCEDURE — A9270 NON-COVERED ITEM OR SERVICE: HCPCS | Performed by: INTERNAL MEDICINE

## 2021-06-09 PROCEDURE — 36415 COLL VENOUS BLD VENIPUNCTURE: CPT

## 2021-06-09 PROCEDURE — 700102 HCHG RX REV CODE 250 W/ 637 OVERRIDE(OP): Performed by: FAMILY MEDICINE

## 2021-06-09 PROCEDURE — 97530 THERAPEUTIC ACTIVITIES: CPT

## 2021-06-09 PROCEDURE — A9270 NON-COVERED ITEM OR SERVICE: HCPCS | Performed by: HOSPITALIST

## 2021-06-09 PROCEDURE — 700102 HCHG RX REV CODE 250 W/ 637 OVERRIDE(OP): Performed by: STUDENT IN AN ORGANIZED HEALTH CARE EDUCATION/TRAINING PROGRAM

## 2021-06-09 PROCEDURE — 700111 HCHG RX REV CODE 636 W/ 250 OVERRIDE (IP): Performed by: INTERNAL MEDICINE

## 2021-06-09 PROCEDURE — 97608 NEG PRS WND THER NDME>50SQCM: CPT

## 2021-06-09 PROCEDURE — 770020 HCHG ROOM/CARE - TELE (206)

## 2021-06-09 PROCEDURE — 700102 HCHG RX REV CODE 250 W/ 637 OVERRIDE(OP): Performed by: HOSPITALIST

## 2021-06-09 PROCEDURE — 700102 HCHG RX REV CODE 250 W/ 637 OVERRIDE(OP): Performed by: SURGERY

## 2021-06-09 PROCEDURE — 302146: Performed by: INTERNAL MEDICINE

## 2021-06-09 RX ORDER — POTASSIUM CHLORIDE 20 MEQ/1
40 TABLET, EXTENDED RELEASE ORAL 2 TIMES DAILY
Status: DISCONTINUED | OUTPATIENT
Start: 2021-06-09 | End: 2021-06-11

## 2021-06-09 RX ORDER — MAGNESIUM SULFATE HEPTAHYDRATE 40 MG/ML
4 INJECTION, SOLUTION INTRAVENOUS ONCE
Status: COMPLETED | OUTPATIENT
Start: 2021-06-09 | End: 2021-06-09

## 2021-06-09 RX ADMIN — MAGNESIUM SULFATE IN WATER 4 G: 40 INJECTION, SOLUTION INTRAVENOUS at 09:15

## 2021-06-09 RX ADMIN — CLONAZEPAM 1 MG: 1 TABLET ORAL at 17:34

## 2021-06-09 RX ADMIN — AMIODARONE HYDROCHLORIDE 200 MG: 200 TABLET ORAL at 04:16

## 2021-06-09 RX ADMIN — HYDROCORTISONE 10 MG: 10 TABLET ORAL at 04:15

## 2021-06-09 RX ADMIN — POTASSIUM CHLORIDE 40 MEQ: 1500 TABLET, EXTENDED RELEASE ORAL at 04:16

## 2021-06-09 RX ADMIN — POTASSIUM CHLORIDE 40 MEQ: 1500 TABLET, EXTENDED RELEASE ORAL at 17:34

## 2021-06-09 RX ADMIN — CLONAZEPAM 0.5 MG: 0.5 TABLET ORAL at 08:24

## 2021-06-09 RX ADMIN — OXYCODONE 10 MG: 5 TABLET ORAL at 08:27

## 2021-06-09 RX ADMIN — ZIPRASIDONE HYDROCHLORIDE 60 MG: 60 CAPSULE ORAL at 20:20

## 2021-06-09 RX ADMIN — Medication 1000 UNITS: at 04:16

## 2021-06-09 RX ADMIN — METRONIDAZOLE 500 MG: 500 TABLET ORAL at 04:16

## 2021-06-09 RX ADMIN — METRONIDAZOLE 500 MG: 500 TABLET ORAL at 14:02

## 2021-06-09 RX ADMIN — DOXYCYCLINE 100 MG: 100 TABLET, FILM COATED ORAL at 04:16

## 2021-06-09 RX ADMIN — CIPROFLOXACIN HYDROCHLORIDE 500 MG: 500 TABLET, FILM COATED ORAL at 17:34

## 2021-06-09 RX ADMIN — HYDROMORPHONE HYDROCHLORIDE 0.5 MG: 1 INJECTION, SOLUTION INTRAMUSCULAR; INTRAVENOUS; SUBCUTANEOUS at 14:31

## 2021-06-09 RX ADMIN — HYDROCORTISONE 5 MG: 10 TABLET ORAL at 15:22

## 2021-06-09 RX ADMIN — AMIODARONE HYDROCHLORIDE 200 MG: 200 TABLET ORAL at 17:34

## 2021-06-09 RX ADMIN — APIXABAN 5 MG: 5 TABLET, FILM COATED ORAL at 04:16

## 2021-06-09 RX ADMIN — APIXABAN 5 MG: 5 TABLET, FILM COATED ORAL at 17:34

## 2021-06-09 RX ADMIN — PAROXETINE HYDROCHLORIDE 20 MG: 20 TABLET, FILM COATED ORAL at 08:24

## 2021-06-09 RX ADMIN — CIPROFLOXACIN HYDROCHLORIDE 500 MG: 500 TABLET, FILM COATED ORAL at 04:16

## 2021-06-09 RX ADMIN — ATORVASTATIN CALCIUM 40 MG: 40 TABLET, FILM COATED ORAL at 17:34

## 2021-06-09 RX ADMIN — DOXYCYCLINE 100 MG: 100 TABLET, FILM COATED ORAL at 17:34

## 2021-06-09 RX ADMIN — LEVOTHYROXINE SODIUM 125 MCG: 0.12 TABLET ORAL at 04:16

## 2021-06-09 RX ADMIN — OXYCODONE 10 MG: 5 TABLET ORAL at 20:20

## 2021-06-09 RX ADMIN — METRONIDAZOLE 500 MG: 500 TABLET ORAL at 20:20

## 2021-06-09 RX ADMIN — ZIPRASIDONE HYDROCHLORIDE 60 MG: 60 CAPSULE ORAL at 04:15

## 2021-06-09 ASSESSMENT — ENCOUNTER SYMPTOMS
COUGH: 0
NAUSEA: 0
NECK PAIN: 0
DIAPHORESIS: 0
CHILLS: 0
NERVOUS/ANXIOUS: 0
FLANK PAIN: 0
WHEEZING: 0
HALLUCINATIONS: 0
SORE THROAT: 0
FOCAL WEAKNESS: 0
BACK PAIN: 0
MYALGIAS: 0
PALPITATIONS: 0
DIZZINESS: 0
ABDOMINAL PAIN: 0
FEVER: 0
DEPRESSION: 0
BLURRED VISION: 0
HEARTBURN: 0
SENSORY CHANGE: 0
CONSTIPATION: 0
VOMITING: 0
HEADACHES: 0
DIARRHEA: 0
SPEECH CHANGE: 0
WEAKNESS: 1
SHORTNESS OF BREATH: 0

## 2021-06-09 ASSESSMENT — COGNITIVE AND FUNCTIONAL STATUS - GENERAL
MOBILITY SCORE: 6
MOVING TO AND FROM BED TO CHAIR: UNABLE
STANDING UP FROM CHAIR USING ARMS: TOTAL
CLIMB 3 TO 5 STEPS WITH RAILING: TOTAL
WALKING IN HOSPITAL ROOM: TOTAL
MOVING FROM LYING ON BACK TO SITTING ON SIDE OF FLAT BED: UNABLE
TURNING FROM BACK TO SIDE WHILE IN FLAT BAD: UNABLE
SUGGESTED CMS G CODE MODIFIER MOBILITY: CN

## 2021-06-09 ASSESSMENT — PAIN DESCRIPTION - PAIN TYPE
TYPE: ACUTE PAIN;SURGICAL PAIN
TYPE: ACUTE PAIN
TYPE: ACUTE PAIN

## 2021-06-09 ASSESSMENT — GAIT ASSESSMENTS: GAIT LEVEL OF ASSIST: UNABLE TO PARTICIPATE

## 2021-06-09 NOTE — CARE PLAN
Problem: Knowledge Deficit - Standard  Goal: Patient and family/care givers will demonstrate understanding of plan of care, disease process/condition, diagnostic tests and medications  Outcome: Progressing    Plan of care discussed w pt and pts concerns addressed.      Problem: Pain - Standard  Goal: Alleviation of pain or a reduction in pain to the patient’s comfort goal  Outcome: Progressing    PRN pain meds given according to MAR and pts pain      The patient is Stable - Low risk of patient condition declining or worsening    Shift Goals  Clinical Goals: Pain control/wound vac  Patient Goals: Pain control   Family Goals: n/a    Progress made toward(s) clinical / shift goals:  Wound vac running through shift and per pt pain controlled.     Patient is not progressing towards the following goals:

## 2021-06-09 NOTE — DISCHARGE PLANNING
Anticipated Discharge Disposition: SNF    Action: Patient discussed in IDT rounds. Per MD the patient is improving medically and will need placement at SNF. RN CM requested updated SNF referral order.     RN CM spoke with patient at bedside about SNF. The patient has previously been to [facility] before. SNF choice received for: 1 [facility], 2 [facility], 3 [facility]. Choice form faxed to [facility].    Per patient he lived at [facility] before coming to the hospital. The patient has a brother in [place], [place]. The patient used a walker for ambulation. The patient denies home oxygen or other DME.     Barriers to Discharge: medical clearance, SNF acceptance    Plan: Follow up with the medical team.

## 2021-06-09 NOTE — DIETARY
Nutrition Services: Brief Update    Poor PO noted per flow sheet.  Pt is currently on a Low Fiber (GI soft) diet with high carb modification.  PO has been highly variable (documented as 0-50% of meals). PO prior to colostomy takedown 5/27 had been consistently >50% of meals.  Spoke with pt at bedside. Pt appeared adequately nourished.  Pt reports to no appetite.  He stated he is trying to consume his meals, but he does not feel hungry.   Pt reported previously receiving Boost oral nutrition supplement at breakfast, which he enjoyed. No current supplement order in place, RD will order per pt request.  Pt stated drinking liquids at this time is easier for him to consume than solids. Pt agreeable to high protein milkshake to optimize intake.  Encouraged pt to call kitchen with meal preferences, encouraged PO intake with pt. Pt verbalized understanding.    Recommendations/Plan:  1. Add Boost Glucose Control and high protein milkshake.  2. Encourage intake of meals and supplements.  3. Document intake of all meals and supplements as % taken in ADLs to provide interdisciplinary communication across all shifts.   4. Monitor weight.  5. Nutrition rep will continue to see patient for ongoing meal and snack preferences.     RD will continue to follow.

## 2021-06-09 NOTE — PROGRESS NOTES
Received bedside report from PM RN, assumed care of pt, pt resting calmly in bed, skin assessed, wound vac assessed, PRN pain meds given according to MAR, plan of care reviewed w pt, pts concerns addressed, and call light within reach.

## 2021-06-09 NOTE — THERAPY
Physical Therapy   Daily Treatment     Patient Name: Sebastián Castro  Age:  56 y.o., Sex:  male  Medical Record #: [id number]  Today's Date: 6/9/2021     Precautions:  Fall Risk    Assessment  Pt with decline in functional status and requires increased assist for all mobility and unable to perform STS and unable to ambulate. Fatigues quickly and unable to scoot in bed requiring TOTAL assist. Sat EOB with assist and 4ww for UE support for 15 mins. Returned to bed and repositioned with rolls to realign pads/chux. Despite multiple attempts for STS and MAX/TOTAL assist of 1 from PT, could not clear bottom or complete STS. Reviewed supine LE exercises and pt able to recite and demo APs, QS, Hamstring curl/hip flex and reports competence. PT encouraged pt to sit up in bed for all meals or to sit EOB with nsg when able. Nsg present for repositioning in bed and aware of pt status/condition. At risk for functional decline and would benefit from continued skilled PT. Will progress POC as tolerated. Recommend post acute placement.     Plan    Continue current treatment plan.    DC Equipment Recommendations: Unable to determine at this time  Discharge Recommendations:  Recommend post-acute placement for additional physical therapy services prior to discharge home      Subjective  Pt agreeable to PT session reporting pain and weakness.      Objective  MAX/TOTAL for bed mobility. Unable to complete STS despite >5 attempts with MAX/TOTAL assist of 1.      06/09/21 1428   Total Time Spent   Total Time Spent (Mins) 28   Charge Group   Charges  Yes   PT Therapeutic Activities 2   Precautions   Precautions Fall Risk   Comments Abd wound vac   Vitals   Vitals Comments WNL throughout   Pain 0 - 10 Group   Therapist Pain Assessment Post Activity Pain Same as Prior to Activity;5   Non Verbal Descriptors   Non Verbal Scale  Calm   Cognition    Level of Consciousness Alert   Comments Pleasant   Strength Lower Body   Comments Reviewed Supine LE  exercises and pt able to recite and reports competence   Balance   Sitting Balance (Static) Fair -   Sitting Balance (Dynamic) Fair -   Weight Shift Sitting Poor   Comments 4WW for UE support   Gait Analysis   Gait Level Of Assist Unable to Participate  (Unable to perform STS with MAX/TOTAL assist)   Bed Mobility    Supine to Sit Maximal Assist   Sit to Supine Maximal Assist   Scooting Total Assist   Rolling Maximal Assist to Rt.;Maximum Assist to Lt.   Skilled Intervention Verbal Cuing;Tactile Cuing;Sequencing;Postural Facilitation   Comments Heavy cueing for all sequencing, mechanics, hand placement   Functional Mobility   Sit to Stand Unable to Participate  (Unable to clear bottom despite MAX/TOTAL assist of 1 from PT)   How much difficulty does the patient currently have...   Turning over in bed (including adjusting bedclothes, sheets and blankets)? 1   Sitting down on and standing up from a chair with arms (e.g., wheelchair, bedside commode, etc.) 1   Moving from lying on back to sitting on the side of the bed? 1   How much help from another person does the patient currently need...   Moving to and from a bed to a chair (including a wheelchair)? 1   Need to walk in a hospital room? 1   Climbing 3-5 steps with a railing? 1   6 clicks Mobility Score 6   Activity Tolerance   Sitting Edge of Bed 15   Comments Fatigue   Patient / Family Goals    Patient / Family Goal #1 walk again   Goal #1 Outcome Goal not met   Short Term Goals    Short Term Goal # 1 Pt will perform supine<->sit with supv within 6 visits in order to return home   Goal Outcome # 1 goal not met   Short Term Goal # 2 Pt will transfer bed<->chair with 4WW with supv within 6 visits in order to return home   Goal Outcome # 2 Goal not met   Short Term Goal # 3 Pt will ambulate 100' with supv within 6 visits in order to return home   Goal Outcome # 3 Goal not met   Short Term Goal # 4 Pt will ascend/descend 5 steps with supv within 6 visits in order to  enter/exit home   Goal Outcome # 4 Goal not met   Education Group   Education Provided Role of Physical Therapist   Role of Physical Therapist Patient Response Patient;Acceptance;Explanation;Verbal Demonstration   Exercises - Supine Patient Response Patient;Acceptance;Explanation;Verbal Demonstration   Additional Comments Reviewed Supine LE exercises   Anticipated Discharge Equipment and Recommendations   DC Equipment Recommendations Unable to determine at this time   Discharge Recommendations Recommend post-acute placement for additional physical therapy services prior to discharge home   Interdisciplinary Plan of Care Collaboration   IDT Collaboration with  Nursing   Patient Position at End of Therapy In Bed;Call Light within Reach;Tray Table within Reach;Phone within Reach;Bed Alarm On   Collaboration Comments RN updated   Session Information   Date / Session Number  6/9-3(1/3, 6/15)

## 2021-06-09 NOTE — DISCHARGE PLANNING
Received Choice form at [phone]  Agency/Facility Name: Local [place] SNFs  Referral sent per Choice form @ [phone]

## 2021-06-09 NOTE — PROGRESS NOTES
Surgery    BMs yest  Patricia PO  Afib    /86   Pulse 100   Temp 36.2 °C (97.2 °F) (Temporal)   Resp 18   Ht 1.829 m (6')   Wt 115 kg (253 lb 8.5 oz)   SpO2 90%   BMI 34.38 kg/m²     NAD  VAC intact  Ost closure site healing well    Recent Labs     06/07/21  0500 06/08/21  0408 06/09/21  0424   WBC 14.5* 12.5* 13.5*   RBC 4.32* 4.45* 4.46*   HEMOGLOBIN 12.4* 12.6* 12.6*   HEMATOCRIT 38.7* 40.5* 40.7*   MCV 89.6 91.0 91.3   MCH 28.7 28.3 28.3   RDW 48.8 49.8 50.8*   PLATELETCT 328 312 358   MPV 10.1 10.4 10.6   NEUTSPOLYS 72.20* 70.20 69.70   LYMPHOCYTES 15.40* 16.50* 18.40*   MONOCYTES 6.60 7.10 7.00   EOSINOPHILS 4.80 4.60 3.30   BASOPHILS 0.40 0.60 0.40     A/P  S/P Colostomy takedown with wound VAC  VAC change today  Trend WBC  Gen care per hospitalists

## 2021-06-09 NOTE — CARE PLAN
Problem: Nutritional:  Goal: Achieve adequate nutritional intake  Description: Patient will consume 50% of meals  Outcome: Not Progressing  PO 0-50% of meals.  RD adjusted pt menu and supplements.

## 2021-06-09 NOTE — CARE PLAN
The patient is Stable - Low risk of patient condition declining or worsening    Shift Goals  Clinical Goals: increase activity tolerance  Patient Goals: Denies  Family Goals: n/a    Progress made toward(s) clinical / shift goals:  pt agreed to sit up in chair with meals. PT/OT on board    Patient is not progressing towards the following goals:       Problem: Psychosocial  Goal: Patient's ability to re-evaluate and adapt role responsibilities will improve  Outcome: Progressing

## 2021-06-09 NOTE — WOUND TEAM
[facility]  Inpatient Services  Wound and Skin Care Progress Note    Admission Date: 2/9/2021     Last order of IP CONSULT TO WOUND CARE was found on 3/18/2021 from Hospital Encounter on 2/9/2021     HPI, PMH, SH: Reviewed    Past Surgical History:   Procedure Laterality Date   • PB EXPLORATORY OF ABDOMEN  5/27/2021    Procedure: LAPAROTOMY, EXPLORATORY;  Surgeon: Kin Desouza D.O.;  Location: [facility];  Service: General   • COLOSTOMY TAKEDOWN  5/27/2021    Procedure: COLOSTOMY TAKEDOWN AND CLOSURE;  Surgeon: Kin Desouza D.O.;  Location: SURGERY [facility];  Service: General   • COLECTOMY N/A 11/10/2020    Procedure: COLECTOMY-SEGMENTAL, COLOSTOMY, MOBILIZATION OF SPLENIC FLEXURE, WOUND VAC PLACEMENT, IRRIGATION AND DEBRIDMENT FLANK WOUND;  Surgeon: Kin Desouza D.O.;  Location: [facility];  Service: General   • ZZZ CARDIAC CATH  07/21/2018    EF 45%, normal coronaries.   • IRRIGATION & DEBRIDEMENT ORTHO Right 2/19/2018    Procedure: IRRIGATION & DEBRIDEMENT ORTHO-FOOT;  Surgeon: Kirby Lopez M.D.;  Location: [facility];  Service: Orthopedics   • TOE AMPUTATION Right 1/17/2018    Procedure: TOE AMPUTATION-1ST RAY;  Surgeon: Doug Delong M.D.;  Location: [facility];  Service: Orthopedics   • TOE AMPUTATION Right 11/10/2017    Procedure: TOE AMPUTATION;  Surgeon: Osorio Leo M.D.;  Location: [facility];  Service: Orthopedics     Social History     Tobacco Use   • Smoking status: Never Smoker   • Smokeless tobacco: Never Used   Substance Use Topics   • Alcohol use: No     Chief Complaint   Patient presents with   • Syncope     X1 this morning when standing up to go to bathroom     Diagnosis: Syncope and collapse [R55]  Suicidal ideation [R45.851]  Sepsis due to Klebsiella pneumoniae (HCC) [A41.4]    Unit where seen by Wound Team: T418/00     WOUND CONSULT/FOLLOW UP RELATED TO: abdominal wound    WOUND HISTORY:  Pt is an older  gentleman well known to [facility] wound team 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 Pt currently admitted since 2/9/21 related to SI concerns due to body image issues related to colostomy.      *Pt underwent surgical reversal of colostomy with Dr. Desouza on 5/20/21. MLI was re-opened at that time and was then closed with interrupted staples and packing. Unfortunately MLI continued to Dehisce and therefore wound team was consulted to further manage MLI with dakins packing.     6/3 Wound vac placed    WOUND ASSESSMENT/LDA      Negative Pressure Wound Therapy 06/03/21 Abdomen (Active)   Vacuum Serial Number [id number]    NPWT Pump Mode / Pressure Setting Continuous;125 mmHg    Dressing Type Medium;Black Foam (Regular)    Number of Foam Pieces Used 2    Canister Changed No    Output (mL) 50 mL    NEXT Dressing Change/Treatment Date 06/11/21    WOUND NURSE ONLY - Time Spent with Patient (mins) 60           Wound 05/27/21 Full Thickness Wound Abdomen (Active)   Wound Image                   06/05/21 0900   Site Assessment Pink;Red;Yellow    Periwound Assessment Intact    Margins Attached edges    Closure Secondary intention    Drainage Amount Scant    Drainage Description Serosanguineous    Treatments Cleansed;Site care    Wound Cleansing Approved Wound Cleanser    Periwound Protectant Drape;Skin Protectant Wipes to Periwound    Dressing Cleansing/Solutions Not Applicable    Dressing Options Wound Vac    Dressing Changed Changed    Dressing Status Clean;Dry;Intact    Dressing Change/Treatment Frequency Monday, Wednesday, Friday, and As Needed    NEXT Dressing Change/Treatment Date 06/11/21    NEXT Weekly Photo (Inpatient Only) 06/14/21    Non-staged Wound Description Full thickness    Wound Length (cm) 20.3 cm    Wound Width (cm) 7.3 cm    Wound Depth (cm) 2 cm    Wound Surface Area (cm^2) 148.19 cm^2    Wound Volume (cm^3) 296.38 cm^3    Wound Healing % -262    Tunneling (cm) 1 cm    Tunneling Clock  Position of Wound 12    Tunneling - 2nd Location (cm) 0.7 cm    Tunneling Clock Position of Wound - 2nd Location 6    Shape irregular    Wound Odor None    Pulses N/A    Exposed Structures None    WOUND NURSE ONLY - Time Spent with Patient (mins) 60      Vascular:    SHAYAN:   No results found.    Lab Values:    Lab Results   Component Value Date/Time    WBC 13.5 (H) 06/09/2021 04:24 AM    RBC 4.46 (L) 06/09/2021 04:24 AM    HEMOGLOBIN 12.6 (L) 06/09/2021 04:24 AM    HEMATOCRIT 40.7 (L) 06/09/2021 04:24 AM    CREACTPROT 0.83 (H) 06/09/2021 04:24 AM    SEDRATEWES 13 10/23/2018 08:02 AM    HBA1C 5.2 02/14/2021 01:30 AM      Culture Results show:  No results found for this or any previous visit (from the past 720 hour(s)).    Pain Level/Medicated:  IV Morphine immediately prior to dressing change      INTERVENTIONS BY WOUND TEAM:  Chart and images reviewed. Discussed with bedside RN. All areas of concern (based on picture review, LDA review and discussion with bedside RN) have been thoroughly assessed. Documentation of areas based on significant findings. This RN in to assess patient. Performed standard wound care which includes appropriate positioning, dressing removal and non-selective debridement. Pictures and measurements obtained weekly if/when required.              ABD:  Preparation for Dressing removal:  Without difficulty   Cleansed with: Wound cleanser  Sharp debridement: NA  Joya wound: Cleansed with Wound cleanser and gauze, No sting skin prep and drape to periwound mepitel one to distal and proximal staples.   Primary Dressing: full thickness spiraled black foam to wound bed, edges pulled together and sealed with drape.  2nd piece of half thickness circular black foam as button.  Secondary (Outer) Dressing:  Sealed with drape, TRAC pad applied.     Interdisciplinary consultation: Patient, Bedside RN (Cornelia), wound RN (Therese)     EVALUATION / RATIONALE FOR TREATMENT:  Most Recent Date:    06/09/21:  Wound  continues to progress, pull edges together to lessen width of wound. Dr. Desouza would like to see wound on Friday 6/7: Pt's wound appears to be improving per measurements and pictures. There is granulation tissue present.   06/5/21: Wound bed improving with increased granulation tissue.  Wound edges approximated prior to drape application.  NPWT continued.    06/03/21:  Fascial sutures intact, wound VAC placed with DR. Desouza.    6/1/21: Wound appears  than yesterdays pictures. Discussed with Dr. Desouza, plan to continue dakins TID for 48hrs, CT scan ordered today. Will re-evaluate on Thursday for potential vac placement.   5/31/21: Wound initially with interrupted staples and packing now with dehiscence. Per Dr. Baeza request wound team initiated dakins packing. Pt may benefit from NPWT to assist with closure.   5/8/21: wound nearly healed. Does not require advanced wound care. Wound team signing off and nursing to re consult if site worsens. Continue with dressing care per order.  4/21/21: abd wound improving. Nursing to continue dsg changes per order.  4/15: Right posterior thigh DTI now resolved. Pink and blanching. Abdominal wound to surface level continuing with dressing POC.  4/7/21: Thigh wound nearly healed and will likely be resolved at next assessment. Abdomen wound appears larger. Continued with Arti to move through inflammatory phase. Hydrofera blue for its bacteriostatic properties and to assist in drainage management.   03/31/21: thigh wound healing well now to a stage 2.  Continue current plan.  All interventions in place.   ABD more denuded and bloody.  Arti to assist in moving wound through inflammatory phase.   3/23/21: Right posterior thigh wound remains a DTI but appears to be evolving into a stage 2. Wound team was previously consulted regarding sacral wound which appeared to be a skin tear however skin now appears to be intact however discolored. Does not appear to be pressure  related as pt is on a waffle, self mobile and sacral mepilex is in use. Area is also not a normal pressure injury shape. Wound team will continue to follow.   3/18/21: Pt with linear DTIs to R posterior thigh from lying on top of frye cath tubing. Offloading precautions ordered. Wound team to continue following.     Goals: Steady decrease in wound area and depth weekly.    WOUND TEAM PLAN OF CARE ([X] for frequency of wound follow up,):   Nursing to follow orders written for wound care. Contact wound team if area fails to progress, deteriorates or with any questions/concerns  Dressing changes by wound team:                   Follow up 3 times weekly:                NPWT change 3 times weekly:   X  Follow up 1-2 times weekly:     Follow up Bi-Monthly:                   Follow up as needed:   Other (explain):     NURSING PLAN OF CARE ORDERS (X):  Dressing changes: See Dressing Care orders: X  Skin care: See Skin Care orders: x  RN Prevention Protocol: x  Rectal tube care: See Rectal Tube Care orders:   Other orders:        Anticipated discharge plans:   LTACH:        SNF/Rehab:                  Home Health Care:           Outpatient Wound Center:            Self/Family Care:        Other:   Group Home

## 2021-06-09 NOTE — PROGRESS NOTES
Monitor Summary    Afib  bpm  Occasional PVC  Rare PVC  Rare Coup    0/.10/0    Per monitor room strip report.

## 2021-06-09 NOTE — PROGRESS NOTES
[facility] Medicine Daily Progress Note    Date of Service  6/8/2021    Chief Complaint  56 y.o. male admitted 2/9/2021 with Syncope.    Hospital Course  Admitted with syncope, he was noted to be bradycardic and hypotensive.  He has known history of permanent atrial fibrillation and he was on diltiazem, digoxin and metoprolol.  These medications were held.  Later on he was restarted on low-dose Toprol and Digoxin.  He was also started on anticoagulation with Eliquis.  He also had a possible history of adrenal insufficiency, he was already on Florinef and midodrine.  Midodrine was discontinued and he was started on cortisol. Further work-up showed that he had gram-negative bacteremia as well as urinary tract infection and acute kidney injury.  He was initially placed on IV Zosyn, later changed to oral Bactrim to finish the course.  He was carefully hydrated with IV fluids.  On admission he also expressed suicidal ideation.  Psychiatry was consulted the case, he was placed on a legal hold.  He was noted to have severe depression.  He has been placed on medication the form of Geodon, Paxil, Klonopin.  He has a colostomy in place, which the patient believes contributes to his severe depression because of ostomy care.  He had history of bowel perforation and splenic fluid collection requiring Segmental colectomy, colostomy creation, mobilization of splenic flexure, wound VAC placement, irrigation and debridement of flank wound on 11/10/2020.  Surgery was consulted on the case, and he underwent exploratory laparotomy and colostomy takedown and closure on 5/27/2021.  6/1- restarted antibiotic per surgery team. WBC stable since started on antibiotic per surgery.   6/7- cardiology consulted again for a-fib management, and pt was switched to amiodarone     Interval Problem Update  Colostomy - pain mostly controlled  Suicidal - less thoughts  Afib - rate 100-120  Diabetes -   SRUTHI - crea trended down to 0.8  Low potassium  and magnesium    6/1- pt denies pain, or other concerns. WBC increasing. Antibiotic was started per surgery team who is concern for infection. CT abdomen ?? Still pending legal hold eval   6/2- pt continues to have tenderness on his surgical site. WBC improving on antibiotic. No need for CT scan. Legal hold pending. Wound vac per surgery.   6/3- wound vac placed today. He complains of some abdominal soreness. No other event. [facility] with [facility]. Continue care inpatient. WBC stable. Still sometimes tachycardic. Continue to  Monitor. Discussed with surgery, eliquis restarted   6/4- feeling well. No abdominal pain. Wound vac in place. Legal hold discontinued     6/5- pt is feeling well overall. He has no problem with wound vac. No signs of active infection around the wound vac. Pictures reviewed and wound is healing. WBC is increasing though. He remain tachycardic. His wound is quite extensive still. He did miss one dose of cipro yesterday however his WBC was increasing even him being on antibiotic. He is afebrile. Resume IV antibiotic for now. He might need longer suppression antibiotic therapy since he is on steroid, and poor healing due to his body habitus., I will add doxycycline too to add MRSA coverage. I will add lasix IV since pt still look some volume overload. I will discontinue florinef for now. That will contribute on water retention and it is a corticosteroid. He is already on hydrocortisone, no need for two steroids at this time. I will hold on CT scan and consider if clinically worsen or new findings. Goal to reduce hydrocortisone to 5 mg BID next week.    6/6- pt feeling tired today. IV one dose lasix given. Later he did have a-fib sustain to 120.  then he became hypotensive and lethargic due to lasix and a-fib with RVR. He did receive 1L of IVF and his mentation, vitals improved. Discontinue lasix. Increase metoprolol. Mild SRUTHI due to lasix. WBC stable. Continue  current antibiotic. Continue to monitor     6/7- feeling tired today again. Report from RN noted loose stool. Pt tells me that he has often loose stool. Monitor for c-diff. Hold miralax. Still soft hypotension. BB held. SRUTHI still present. BNP elevated. Hold on fluid at this time. Cardiology consulted and pt will be started on amiodarone. Stop BB and digoxin for now. Continue tele monitoring for now. Protein/Cr ratio to follow up per cardiology recs. Previous UA negative for proteinuria     6/8: Evaluated examined the patient at bedside, denied any depression or symptoms but, patient is laying flat with no shortness of breath, start with IV fluid for SRUTHI, ultrasound did not show hydronephrosis, keep holding Lasix.       Consultants/Specialty  Cardiology  Psychiatry  Surgery    Code Status  Full Code    Disposition  Legal Hold  SNF vs Inpatient psychiatry vs Group home  Patient needs help for daily activities and supervision [id number]      Review of Systems  Review of Systems   Constitutional: Positive for malaise/fatigue. Negative for chills, diaphoresis and fever.   HENT: Negative for congestion, hearing loss and sore throat.    Eyes: Negative for blurred vision.   Respiratory: Negative for cough, shortness of breath and wheezing.    Cardiovascular: Negative for chest pain, palpitations and leg swelling.   Gastrointestinal: Negative for abdominal pain, constipation, diarrhea, heartburn, nausea and vomiting.   Genitourinary: Negative for dysuria, flank pain and hematuria.   Musculoskeletal: Negative for back pain, joint pain, myalgias and neck pain.   Skin: Negative for rash.   Neurological: Positive for weakness. Negative for dizziness, sensory change, speech change, focal weakness and headaches.   Psychiatric/Behavioral: Negative for depression, hallucinations and suicidal ideas. The patient is not nervous/anxious.       Physical Exam  Temp:  [36 °C (96.8 °F)-36.9 °C (98.4 °F)] 36.9 °C (98.4 °F)  Pulse:  [83-96]  90  Resp:  [16-18] 18  BP: (102-128)/(63-78) 110/68  SpO2:  [95 %-99 %] 95 %    Physical Exam  Vitals and nursing note reviewed.   Constitutional:       Appearance: He is obese.   HENT:      Head: Normocephalic and atraumatic.      Nose: No congestion.      Mouth/Throat:      Mouth: Mucous membranes are moist.   Eyes:      Extraocular Movements: Extraocular movements intact.      Conjunctiva/sclera: Conjunctivae normal.      Pupils: Pupils are equal, round, and reactive to light.   Cardiovascular:      Rate and Rhythm: Tachycardia present. Rhythm irregular.   Pulmonary:      Effort: Pulmonary effort is normal. No respiratory distress.      Breath sounds: Rales present. No wheezing.   Abdominal:      General: There is no distension.      Tenderness: There is no abdominal tenderness. There is no guarding or rebound.      Comments: Wound vac in place    Musculoskeletal:      Cervical back: No muscular tenderness.      Right lower leg: No edema.      Left lower leg: No edema.   Skin:     General: Skin is warm and dry.      Findings: No bruising.   Neurological:      General: No focal deficit present.      Mental Status: He is alert and oriented to person, place, and time.      Cranial Nerves: No cranial nerve deficit.      Motor: No weakness.   Psychiatric:         Mood and Affect: Mood is not anxious.         Speech: Speech normal.         Thought Content: Thought content is not paranoid or delusional. Thought content does not include homicidal or suicidal ideation. Thought content does not include homicidal or suicidal plan.         Judgment: Judgment is not impulsive.     Is agreeable to Lovenox so RVE, with RV lead insulin if he explained that comfort any risk is on her home  Fluids    Intake/Output Summary (Last 24 hours) at 6/8/2021 1801  Last data filed at 6/8/2021 1300  Gross per 24 hour   Intake 240 ml   Output 325 ml   Net -85 ml       Laboratory  Recent Labs     06/06/21  0602 06/07/21  0500 06/08/21  5828    WBC 17.8* 14.5* 12.5*   RBC 4.63* 4.32* 4.45*   HEMOGLOBIN 13.2* 12.4* 12.6*   HEMATOCRIT 42.6 38.7* 40.5*   MCV 92.0 89.6 91.0   MCH 28.5 28.7 28.3   MCHC 31.0* 32.0* 31.1*   RDW 51.0* 48.8 49.8   PLATELETCT 357 328 312   MPV 10.9 10.1 10.4     Recent Labs     06/06/21  0602 06/07/21  0500 06/08/21  0408   SODIUM 138 130* 135   POTASSIUM 3.8 3.4* 3.5*   CHLORIDE 102 97 106   CO2 25 24 21   GLUCOSE 128* 106* 111*   BUN 18 22 22   CREATININE 2.28* 2.38* 2.05*   CALCIUM 8.9 8.7 8.3*                   Imaging  US-RENAL   Final Result      1.  Low-level echogenic debris in the urinary bladder is noted which could indicate UTI or blood within the bladder.      2.  No hydronephrosis.      IR-MIDLINE CATHETER INSERTION WO GUIDANCE > AGE 3   Final Result                  Ultrasound-guided midline placement performed by qualified nursing staff    as above.          EC-ECHOCARDIOGRAM COMPLETE W/ CONT   Final Result      DX-CHEST-PORTABLE (1 VIEW)   Final Result      1.  Minor asymmetric interstitial opacity in the right lower lobe which could represent minor interstitial edema, pneumonia, or fibrotic change.      2.  No left lung consolidation.      3.  No evidence of congestive failure.      [id number] VIEW   Final Result         No specific finding to suggest small bowel obstruction.      [id number] VIEW   Final Result         No significant interval change.      DX-CHEST-LIMITED (1 VIEW)   Final Result         Diffuse interstitial prominence could relate to mild pulmonary edema or atypical infection      [id number] VIEW   Final Result      Increased colonic gas without definite bowel obstruction.      US-RENAL   Final Result      No evidence of hydronephrosis.      Lobulated kidneys bilaterally.      CT-ABDOMEN-PELVIS WITH   Final Result      1.  There is no evidence of small bowel obstruction.   2.  There is a left lower quadrant ostomy.   3.  There is fluid distention of the colon distal to the surgical material  in the left mid descending colon extending all the way to the rectum. There is no pneumatosis or free air.   4.  There is cholelithiasis without biliary dilatation.   5.  There has been interval removal of the drainage catheter anterior to the spleen with minimal hypodense area in the anterior spleen again noted. There is no new fluid collection.      IR-US GUIDED PIV   Final Result    Ultrasound-guided PERIPHERAL IV INSERTION performed by    qualified nursing staff as above.      EC-ECHOCARDIOGRAM COMPLETE W/O CONT   Final Result      DX-LUMBAR SPINE-2 OR 3 VIEWS   Final Result      Moderate compression deformity of T11 is new compared to 2018.      Mild wedge deformity of T12 is unchanged.      Degenerative changes including facet arthropathy.      Mild retrolisthesis of L5 on S1 and L3 on L4.              Assessment/Plan  SRUTHI (acute kidney injury) (HCC)- (present on admission)  Assessment & Plan  6/1- resolved   FeNa showed prerenal  IV fluid hold Lasix    Leukocytosis- (present on admission)  Assessment & Plan  Follow cbc  6/4- still persistent. Continue to monitor   6/5- SIRS positive. WBC increasing . No clear source of infection. Continue antibiotic cipro/flagyl/doxycycline   6/6- stable   6/7- improving with antibiotic     Obstructive uropathy- (present on admission)  Assessment & Plan  Bladder scan and straight cath prn    Permanent atrial fibrillation (HCC)- (present on admission)  Assessment & Plan  6/7- a-fib persistent. Cardiology consulted again to start amiodarone   Stop digoxin high risk toxicity with SRUTHI   Stop metoprolol since hypotensive   Tele monitoring  Continue eliquis     Chronic venous insufficiency of lower extremity- (present on admission)  Assessment & Plan  compression stockings.    Chronic heart failure with preserved ejection fraction (HCC)- (present on admission)  Assessment & Plan  Toprol  follow probnp  6/7- cardiology will follow. BNP elevated. Cannot give lasix with SRUTHI     Type 2  diabetes mellitus with hyperglycemia, with long-term current use of insulin (HCC)- (present on admission)  Assessment & Plan  Follow bmp  Currently not requiring Insulin    Major depressive disorder, recurrent, severe with psychotic features (HCC)- (present on admission)  Assessment & Plan  Geodon, Paxil, Klonopin  Psychiatry following     Colostomy present (HCC)- (present on admission)  Assessment & Plan  Exploratory laparotomy with colostomy takedown and closure 5/27/2021  Segmental colectomy, colostomy creation, mobilization of splenic flexure, wound VAC placement, irrigation and debridement of flank wound on 11/10/2020.  6/1- abdominal wound pictures reviewed. Review recs from surgery. Started on antibiotic. Continue to monitor   6/2- discuss with surgery. Continue oral antibiotic. WBC improving. Wound vac per surgery.   6/3- wound vac placed. Continue wound care, antibiotic per surgery recs. Continue to monitor   6/5- pt tachycardic, WBC continues to increase. Resume antibiotic. Possible pt will need suppression antibiotic therapy since his wound is high risk for infection due to him being on steroid, body habitus. No other source of infection at this time.   Cipro/flagyl/doxycycline until 6/14 6/6- wbc stable. Continue to monitor. Still slight tachycardic   6/7- WBC improving. Continue current antibiotic       Hypothyroidism- (present on admission)  Assessment & Plan  Increased Levothyroxine to 125 mcg  Check TSH on 6/29/2021    Adrenal insufficiency (HCC)- (present on admission)  Assessment & Plan  Possible?  Goal to reduce corticef 5 mg BID 6/12  And then corticef 5 mg daily on 7/4  He need endo chronology evaluation     Debility- (present on admission)  Assessment & Plan  PT and OT   vit d level low 5/29- start replacement     Suicidal ideation- (present on admission)  Assessment & Plan  6/4- legal hold discontinued.   Possible SNF placement     Orthostatic hypotension- (present on admission)  Assessment &  Plan  Stop florinef. Consider midodrine once heart rate improving   Florinef increase risk of water retention, poor wound healing and he is already on hydrocortisone   Stop metoprolol or other BP meds      Mixed hyperlipidemia- (present on admission)  Assessment & Plan  Atorvastatin.     Lower limb amputation, great toe (HCC)- (present on admission)  Assessment & Plan  Monitor  No signs of infection     Hypomagnesemia- (present on admission)  Assessment & Plan  6/7- replace again,. borderline low. Ongoing diarrhea     Class 2 severe obesity with serious comorbidity and body mass index (BMI) of 35.0 to 35.9 in adult (HCC)- (present on admission)  Assessment & Plan  Body mass index is 35.19 kg/m².    Hypokalemia- (present on admission)  Assessment & Plan  Start Kdur  follow bmp  Replace properly        VTE prophylaxis: eliquis

## 2021-06-09 NOTE — PROGRESS NOTES
[facility] Medicine Daily Progress Note    Date of Service  6/9/2021    Chief Complaint  56 y.o. male admitted 2/9/2021 with Syncope.    Hospital Course    56-year-old male with history of atrial fibrillation, diabetes, adrenal insufficiency, depression and obesity presented 2/9 with syncope and bradycardia with hypotension, patient was on diltiazem, digoxin and metoprolol which all were held, had improved and resumed metoprolol and digoxin with later switched to amiodarone by cardiologist, Eliquis was started for anticoagulation.    He also had a possible history of adrenal insufficiency, he was already on Florinef and midodrine.  Midodrine was discontinued and he was started on cortisol.     Further work-up showed that he had gram-negative bacteremia as well as urinary tract infection and acute kidney injury.  He was initially placed on IV Zosyn, later changed to oral Bactrim to finish the course.  He was carefully hydrated with IV fluids.      On admission he also expressed suicidal ideation.  Psychiatry was consulted the case, he was placed on a legal hold.  He was noted to have severe depression.  He has been placed on medication the form of Geodon, Paxil, Klonopin.  He has a colostomy in place, which the patient believes contributes to his severe depression because of ostomy care.        He had history of bowel perforation and splenic fluid collection requiring Segmental colectomy, colostomy creation, mobilization of splenic flexure, wound VAC placement, irrigation and debridement of flank wound on 11/10/2020.  Surgery was consulted on the case, and he underwent exploratory laparotomy and colostomy takedown and closure on 5/27/2021.    Patient lives by himself and is not able to take care of himself at this time, PT and OT evaluated the patient and recommended SNF    Interval Problem Update  -Evaluated examined the patient at bedside, feels weakness, however no fever or chills and no events last  night.  -Improving on his kidney function with IV fluid  -Wound VAC in place  -Working for SNF placement      Consultants/Specialty  Cardiology  Psychiatry  Surgery    Code Status  Full Code    Disposition  Legal Hold  SNF vs Inpatient psychiatry vs Group home  Patient needs help for daily activities and supervision [id number]      Review of Systems  Review of Systems   Constitutional: Positive for malaise/fatigue. Negative for chills, diaphoresis and fever.   HENT: Negative for congestion, hearing loss and sore throat.    Eyes: Negative for blurred vision.   Respiratory: Negative for cough, shortness of breath and wheezing.    Cardiovascular: Negative for chest pain, palpitations and leg swelling.   Gastrointestinal: Negative for abdominal pain, constipation, diarrhea, heartburn, nausea and vomiting.   Genitourinary: Negative for dysuria, flank pain and hematuria.   Musculoskeletal: Negative for back pain, joint pain, myalgias and neck pain.   Skin: Negative for rash.   Neurological: Positive for weakness. Negative for dizziness, sensory change, speech change, focal weakness and headaches.   Psychiatric/Behavioral: Negative for depression, hallucinations and suicidal ideas. The patient is not nervous/anxious.       Physical Exam  Temp:  [35.9 °C (96.7 °F)-36.3 °C (97.4 °F)] 36.3 °C (97.3 °F)  Pulse:  [] 93  Resp:  [18-19] 18  BP: (114-137)/(69-86) 137/86  SpO2:  [90 %-95 %] 95 %    Physical Exam  Vitals and nursing note reviewed.   Constitutional:       Appearance: He is obese.   HENT:      Head: Normocephalic and atraumatic.      Nose: No congestion.      Mouth/Throat:      Mouth: Mucous membranes are moist.   Eyes:      Extraocular Movements: Extraocular movements intact.      Conjunctiva/sclera: Conjunctivae normal.      Pupils: Pupils are equal, round, and reactive to light.   Cardiovascular:      Rate and Rhythm: Tachycardia present. Rhythm irregular.   Pulmonary:      Effort: Pulmonary effort is normal. No  respiratory distress.      Breath sounds: Rales present. No wheezing.   Abdominal:      General: There is no distension.      Tenderness: There is no abdominal tenderness. There is no guarding or rebound.      Comments: Wound vac in place    Musculoskeletal:      Cervical back: No muscular tenderness.      Right lower leg: No edema.      Left lower leg: No edema.   Skin:     General: Skin is warm and dry.      Findings: No bruising.   Neurological:      General: No focal deficit present.      Mental Status: He is alert and oriented to person, place, and time.      Cranial Nerves: No cranial nerve deficit.      Motor: No weakness.   Psychiatric:         Mood and Affect: Mood is not anxious.         Speech: Speech normal.         Thought Content: Thought content is not paranoid or delusional. Thought content does not include homicidal or suicidal ideation. Thought content does not include homicidal or suicidal plan.         Judgment: Judgment is not impulsive.     Is agreeable to Lovenox so RVE, with RV lead insulin if he explained that comfort any risk is on her home  Fluids    Intake/Output Summary (Last 24 hours) at 6/9/2021 1632  Last data filed at 6/9/2021 0400  Gross per 24 hour   Intake 900 ml   Output 250 ml   Net 650 ml       Laboratory  Recent Labs     06/07/21  0500 06/08/21  0408 06/09/21  0424   WBC 14.5* 12.5* 13.5*   RBC 4.32* 4.45* 4.46*   HEMOGLOBIN 12.4* 12.6* 12.6*   HEMATOCRIT 38.7* 40.5* 40.7*   MCV 89.6 91.0 91.3   MCH 28.7 28.3 28.3   MCHC 32.0* 31.1* 31.0*   RDW 48.8 49.8 50.8*   PLATELETCT 328 312 358   MPV 10.1 10.4 10.6     Recent Labs     06/07/21  0500 06/08/21  0408 06/09/21  0424   SODIUM 130* 135 138   POTASSIUM 3.4* 3.5* 3.5*   CHLORIDE 97 106 108   CO2 24 21 22   GLUCOSE 106* 111* 103*   BUN 22 22 19   CREATININE 2.38* 2.05* 1.75*   CALCIUM 8.7 8.3* 8.4*                   Imaging  US-RENAL   Final Result      1.  Low-level echogenic debris in the urinary bladder is noted which could  indicate UTI or blood within the bladder.      2.  No hydronephrosis.      IR-MIDLINE CATHETER INSERTION WO GUIDANCE > AGE 3   Final Result                  Ultrasound-guided midline placement performed by qualified nursing staff    as above.          EC-ECHOCARDIOGRAM COMPLETE W/ CONT   Final Result      DX-CHEST-PORTABLE (1 VIEW)   Final Result      1.  Minor asymmetric interstitial opacity in the right lower lobe which could represent minor interstitial edema, pneumonia, or fibrotic change.      2.  No left lung consolidation.      3.  No evidence of congestive failure.      XH-RIAGHIR-5 VIEW   Final Result         No specific finding to suggest small bowel obstruction.      [id number] VIEW   Final Result         No significant interval change.      DX-CHEST-LIMITED (1 VIEW)   Final Result         Diffuse interstitial prominence could relate to mild pulmonary edema or atypical infection      [id number] VIEW   Final Result      Increased colonic gas without definite bowel obstruction.      US-RENAL   Final Result      No evidence of hydronephrosis.      Lobulated kidneys bilaterally.      CT-ABDOMEN-PELVIS WITH   Final Result      1.  There is no evidence of small bowel obstruction.   2.  There is a left lower quadrant ostomy.   3.  There is fluid distention of the colon distal to the surgical material in the left mid descending colon extending all the way to the rectum. There is no pneumatosis or free air.   4.  There is cholelithiasis without biliary dilatation.   5.  There has been interval removal of the drainage catheter anterior to the spleen with minimal hypodense area in the anterior spleen again noted. There is no new fluid collection.      IR-US GUIDED PIV   Final Result    Ultrasound-guided PERIPHERAL IV INSERTION performed by    qualified nursing staff as above.      EC-ECHOCARDIOGRAM COMPLETE W/O CONT   Final Result      DX-LUMBAR SPINE-2 OR 3 VIEWS   Final Result      Moderate compression  deformity of T11 is new compared to 2018.      Mild wedge deformity of T12 is unchanged.      Degenerative changes including facet arthropathy.      Mild retrolisthesis of L5 on S1 and L3 on L4.              Assessment/Plan  Leukocytosis- (present on admission)  Assessment & Plan  Around 12-13  No fever or chills  Continue his medications  Consider repeat CT scan for abdomen if needed    Colostomy present (HCC)- (present on admission)  Assessment & Plan  Exploratory laparotomy with colostomy takedown and closure 5/27/2021  Segmental colectomy, colostomy creation, mobilization of splenic flexure, wound VAC placement, irrigation and debridement of flank wound on 11/10/2020.  Improving on his leukocytosis  Continue antibiotics  No fever or chills and wound VAC in place   working for SNF        SRUTHI (acute kidney injury) ([facility])- (present on admission)  Assessment & Plan  6/1- resolved   FeNa showed prerenal  IV fluid hold Lasix    Debility- (present on admission)  Assessment & Plan  PT and OT; SNF  vit d level low 5/29- start replacement     Obstructive uropathy- (present on admission)  Assessment & Plan  Bladder scan and straight cath prn    Permanent atrial fibrillation (HCC)- (present on admission)  Assessment & Plan  6/7- a-fib persistent. Cardiology consulted again to start amiodarone   Stop digoxin high risk toxicity with SRUTHI   Stop metoprolol since hypotensive   Tele monitoring  Continue eliquis     Chronic venous insufficiency of lower extremity- (present on admission)  Assessment & Plan  compression stockings.    Chronic heart failure with preserved ejection fraction (HCC)- (present on admission)  Assessment & Plan  Toprol  follow probnp  6/7- cardiology will follow. BNP elevated. Cannot give lasix with SRUTHI     Type 2 diabetes mellitus with hyperglycemia, with long-term current use of insulin (HCC)- (present on admission)  Assessment & Plan  Follow bmp  Currently not requiring Insulin    Major depressive  disorder, recurrent, severe with psychotic features (HCC)- (present on admission)  Assessment & Plan  Geodon, Paxil, Klonopin  Psychiatry following     Hypothyroidism- (present on admission)  Assessment & Plan  Increased Levothyroxine to 125 mcg  Check TSH on 6/29/2021    Adrenal insufficiency (HCC)- (present on admission)  Assessment & Plan  Possible?  Goal to reduce corticef 5 mg BID 6/12  And then corticef 5 mg daily on 7/4  He need endo chronology evaluation     Suicidal ideation- (present on admission)  Assessment & Plan  6/4- legal hold discontinued.   Possible SNF placement     Orthostatic hypotension- (present on admission)  Assessment & Plan  Stop florinef. Consider midodrine once heart rate improving   Florinef increase risk of water retention, poor wound healing and he is already on hydrocortisone   Stop metoprolol or other BP meds      Mixed hyperlipidemia- (present on admission)  Assessment & Plan  Atorvastatin.     Hypomagnesemia- (present on admission)  Assessment & Plan  6/7- replace again,. borderline low. Ongoing diarrhea     Class 2 severe obesity with serious comorbidity and body mass index (BMI) of 35.0 to 35.9 in adult (HCC)- (present on admission)  Assessment & Plan  Body mass index is 35.19 kg/m².    Hypokalemia- (present on admission)  Assessment & Plan  Start Kdur  follow bmp  Replace properly     Lower limb amputation, great toe (HCC)- (present on admission)  Assessment & Plan  Monitor  No signs of infection        VTE prophylaxis: eliquis

## 2021-06-10 LAB
ANION GAP SERPL CALC-SCNC: 8 MMOL/L (ref 7–16)
BASOPHILS # BLD AUTO: 0.6 % (ref 0–1.8)
BASOPHILS # BLD: 0.07 K/UL (ref 0–0.12)
BUN SERPL-MCNC: 18 MG/DL (ref 8–22)
CALCIUM SERPL-MCNC: 8 MG/DL (ref 8.5–10.5)
CHLORIDE SERPL-SCNC: 107 MMOL/L (ref 96–112)
CO2 SERPL-SCNC: 21 MMOL/L (ref 20–33)
CREAT SERPL-MCNC: 1.59 MG/DL (ref 0.5–1.4)
CRP SERPL HS-MCNC: 0.48 MG/DL (ref 0–0.75)
EOSINOPHIL # BLD AUTO: 0.44 K/UL (ref 0–0.51)
EOSINOPHIL NFR BLD: 4 % (ref 0–6.9)
ERYTHROCYTE [DISTWIDTH] IN BLOOD BY AUTOMATED COUNT: 51.5 FL (ref 35.9–50)
GLUCOSE SERPL-MCNC: 83 MG/DL (ref 65–99)
HCT VFR BLD AUTO: 38.6 % (ref 42–52)
HGB BLD-MCNC: 12.3 G/DL (ref 14–18)
IMM GRANULOCYTES # BLD AUTO: 0.11 K/UL (ref 0–0.11)
IMM GRANULOCYTES NFR BLD AUTO: 1 % (ref 0–0.9)
LYMPHOCYTES # BLD AUTO: 2.81 K/UL (ref 1–4.8)
LYMPHOCYTES NFR BLD: 25.4 % (ref 22–41)
MAGNESIUM SERPL-MCNC: 1.9 MG/DL (ref 1.5–2.5)
MCH RBC QN AUTO: 29.2 PG (ref 27–33)
MCHC RBC AUTO-ENTMCNC: 31.9 G/DL (ref 33.7–35.3)
MCV RBC AUTO: 91.7 FL (ref 81.4–97.8)
MONOCYTES # BLD AUTO: 0.79 K/UL (ref 0–0.85)
MONOCYTES NFR BLD AUTO: 7.1 % (ref 0–13.4)
NEUTROPHILS # BLD AUTO: 6.86 K/UL (ref 1.82–7.42)
NEUTROPHILS NFR BLD: 61.9 % (ref 44–72)
NRBC # BLD AUTO: 0 K/UL
NRBC BLD-RTO: 0 /100 WBC
PHOSPHATE SERPL-MCNC: 2.2 MG/DL (ref 2.5–4.5)
PLATELET # BLD AUTO: 318 K/UL (ref 164–446)
PMV BLD AUTO: 9.8 FL (ref 9–12.9)
POTASSIUM SERPL-SCNC: 3.7 MMOL/L (ref 3.6–5.5)
RBC # BLD AUTO: 4.21 M/UL (ref 4.7–6.1)
SODIUM SERPL-SCNC: 136 MMOL/L (ref 135–145)
WBC # BLD AUTO: 11.1 K/UL (ref 4.8–10.8)

## 2021-06-10 PROCEDURE — A9270 NON-COVERED ITEM OR SERVICE: HCPCS | Performed by: NURSE PRACTITIONER

## 2021-06-10 PROCEDURE — 99232 SBSQ HOSP IP/OBS MODERATE 35: CPT | Performed by: INTERNAL MEDICINE

## 2021-06-10 PROCEDURE — 700102 HCHG RX REV CODE 250 W/ 637 OVERRIDE(OP): Performed by: FAMILY MEDICINE

## 2021-06-10 PROCEDURE — A9270 NON-COVERED ITEM OR SERVICE: HCPCS | Performed by: INTERNAL MEDICINE

## 2021-06-10 PROCEDURE — 700102 HCHG RX REV CODE 250 W/ 637 OVERRIDE(OP): Performed by: NURSE PRACTITIONER

## 2021-06-10 PROCEDURE — 700105 HCHG RX REV CODE 258: Performed by: INTERNAL MEDICINE

## 2021-06-10 PROCEDURE — 36415 COLL VENOUS BLD VENIPUNCTURE: CPT

## 2021-06-10 PROCEDURE — A9270 NON-COVERED ITEM OR SERVICE: HCPCS | Performed by: SURGERY

## 2021-06-10 PROCEDURE — 700101 HCHG RX REV CODE 250: Performed by: INTERNAL MEDICINE

## 2021-06-10 PROCEDURE — 700102 HCHG RX REV CODE 250 W/ 637 OVERRIDE(OP): Performed by: INTERNAL MEDICINE

## 2021-06-10 PROCEDURE — 700102 HCHG RX REV CODE 250 W/ 637 OVERRIDE(OP): Performed by: STUDENT IN AN ORGANIZED HEALTH CARE EDUCATION/TRAINING PROGRAM

## 2021-06-10 PROCEDURE — 84100 ASSAY OF PHOSPHORUS: CPT

## 2021-06-10 PROCEDURE — 700102 HCHG RX REV CODE 250 W/ 637 OVERRIDE(OP): Performed by: HOSPITALIST

## 2021-06-10 PROCEDURE — A9270 NON-COVERED ITEM OR SERVICE: HCPCS | Performed by: STUDENT IN AN ORGANIZED HEALTH CARE EDUCATION/TRAINING PROGRAM

## 2021-06-10 PROCEDURE — 770020 HCHG ROOM/CARE - TELE (206)

## 2021-06-10 PROCEDURE — A9270 NON-COVERED ITEM OR SERVICE: HCPCS | Performed by: FAMILY MEDICINE

## 2021-06-10 PROCEDURE — A9270 NON-COVERED ITEM OR SERVICE: HCPCS | Performed by: HOSPITALIST

## 2021-06-10 PROCEDURE — 700102 HCHG RX REV CODE 250 W/ 637 OVERRIDE(OP): Performed by: SURGERY

## 2021-06-10 PROCEDURE — 85025 COMPLETE CBC W/AUTO DIFF WBC: CPT

## 2021-06-10 PROCEDURE — 83735 ASSAY OF MAGNESIUM: CPT

## 2021-06-10 PROCEDURE — 86140 C-REACTIVE PROTEIN: CPT

## 2021-06-10 PROCEDURE — 80048 BASIC METABOLIC PNL TOTAL CA: CPT

## 2021-06-10 RX ORDER — METOCLOPRAMIDE 10 MG/1
10 TABLET ORAL EVERY 6 HOURS PRN
Status: DISCONTINUED | OUTPATIENT
Start: 2021-06-10 | End: 2021-06-15

## 2021-06-10 RX ADMIN — AMIODARONE HYDROCHLORIDE 200 MG: 200 TABLET ORAL at 05:58

## 2021-06-10 RX ADMIN — AMIODARONE HYDROCHLORIDE 200 MG: 200 TABLET ORAL at 17:50

## 2021-06-10 RX ADMIN — CLONAZEPAM 0.5 MG: 0.5 TABLET ORAL at 06:03

## 2021-06-10 RX ADMIN — Medication 1000 UNITS: at 05:58

## 2021-06-10 RX ADMIN — SODIUM CHLORIDE 1000 ML: 9 INJECTION, SOLUTION INTRAVENOUS at 14:52

## 2021-06-10 RX ADMIN — POTASSIUM CHLORIDE 40 MEQ: 1500 TABLET, EXTENDED RELEASE ORAL at 17:52

## 2021-06-10 RX ADMIN — CIPROFLOXACIN HYDROCHLORIDE 500 MG: 500 TABLET, FILM COATED ORAL at 05:58

## 2021-06-10 RX ADMIN — OXYCODONE 5 MG: 5 TABLET ORAL at 17:50

## 2021-06-10 RX ADMIN — METRONIDAZOLE 500 MG: 500 TABLET ORAL at 14:51

## 2021-06-10 RX ADMIN — OXYCODONE 10 MG: 5 TABLET ORAL at 00:22

## 2021-06-10 RX ADMIN — OXYCODONE 10 MG: 5 TABLET ORAL at 06:01

## 2021-06-10 RX ADMIN — ATORVASTATIN CALCIUM 40 MG: 40 TABLET, FILM COATED ORAL at 17:50

## 2021-06-10 RX ADMIN — APIXABAN 5 MG: 5 TABLET, FILM COATED ORAL at 05:58

## 2021-06-10 RX ADMIN — DOXYCYCLINE 100 MG: 100 TABLET, FILM COATED ORAL at 05:59

## 2021-06-10 RX ADMIN — ZIPRASIDONE HYDROCHLORIDE 60 MG: 60 CAPSULE ORAL at 22:06

## 2021-06-10 RX ADMIN — OXYCODONE 5 MG: 5 TABLET ORAL at 20:58

## 2021-06-10 RX ADMIN — ZIPRASIDONE HYDROCHLORIDE 60 MG: 60 CAPSULE ORAL at 09:28

## 2021-06-10 RX ADMIN — APIXABAN 5 MG: 5 TABLET, FILM COATED ORAL at 17:51

## 2021-06-10 RX ADMIN — DOXYCYCLINE 100 MG: 100 TABLET, FILM COATED ORAL at 17:51

## 2021-06-10 RX ADMIN — HYDROCORTISONE 10 MG: 10 TABLET ORAL at 06:03

## 2021-06-10 RX ADMIN — METRONIDAZOLE 500 MG: 500 TABLET ORAL at 20:58

## 2021-06-10 RX ADMIN — SODIUM CHLORIDE 1000 ML: 9 INJECTION, SOLUTION INTRAVENOUS at 00:25

## 2021-06-10 RX ADMIN — POTASSIUM CHLORIDE 40 MEQ: 1500 TABLET, EXTENDED RELEASE ORAL at 05:58

## 2021-06-10 RX ADMIN — OXYCODONE 5 MG: 5 TABLET ORAL at 09:28

## 2021-06-10 RX ADMIN — OXYCODONE 5 MG: 5 TABLET ORAL at 14:51

## 2021-06-10 RX ADMIN — PAROXETINE HYDROCHLORIDE 20 MG: 20 TABLET, FILM COATED ORAL at 09:28

## 2021-06-10 RX ADMIN — METRONIDAZOLE 500 MG: 500 TABLET ORAL at 05:58

## 2021-06-10 RX ADMIN — CLONAZEPAM 1 MG: 1 TABLET ORAL at 17:52

## 2021-06-10 RX ADMIN — HYDROCORTISONE 5 MG: 10 TABLET ORAL at 16:16

## 2021-06-10 RX ADMIN — CIPROFLOXACIN HYDROCHLORIDE 500 MG: 500 TABLET, FILM COATED ORAL at 17:51

## 2021-06-10 RX ADMIN — SODIUM PHOSPHATE, MONOBASIC, MONOHYDRATE AND SODIUM PHOSPHATE, DIBASIC, ANHYDROUS 30 MMOL: 276; 142 INJECTION, SOLUTION INTRAVENOUS at 16:16

## 2021-06-10 RX ADMIN — LEVOTHYROXINE SODIUM 125 MCG: 0.12 TABLET ORAL at 05:59

## 2021-06-10 ASSESSMENT — PAIN DESCRIPTION - PAIN TYPE
TYPE: ACUTE PAIN
TYPE: ACUTE PAIN;SURGICAL PAIN
TYPE: ACUTE PAIN;SURGICAL PAIN
TYPE: ACUTE PAIN
TYPE: SURGICAL PAIN
TYPE: ACUTE PAIN

## 2021-06-10 ASSESSMENT — ENCOUNTER SYMPTOMS
SENSORY CHANGE: 0
WHEEZING: 0
ABDOMINAL PAIN: 0
COUGH: 0
NECK PAIN: 0
FOCAL WEAKNESS: 0
CONSTIPATION: 0
WEAKNESS: 1
SORE THROAT: 0
NAUSEA: 0
MYALGIAS: 0
HEARTBURN: 0
DIZZINESS: 0
SPEECH CHANGE: 0
NERVOUS/ANXIOUS: 0
SHORTNESS OF BREATH: 0
FEVER: 0
DIAPHORESIS: 0
CHILLS: 0
PALPITATIONS: 0
DEPRESSION: 0
DIARRHEA: 0
BLURRED VISION: 0
BACK PAIN: 0
FLANK PAIN: 0
VOMITING: 0
HALLUCINATIONS: 0
HEADACHES: 0

## 2021-06-10 NOTE — PROGRESS NOTES
Received bedside report received, assumed care of pt, pt resting in bed, plan of care explained, call light within reach, pts concerns addressed, wound vac assessed, skin assessed, and fall prec in place.

## 2021-06-10 NOTE — PROGRESS NOTES
"2 RN skin check complete with RN Andrew  Devices in place: tele box, and wound vac to midline abd  Skin assessed under devices: n/a  LLQ scab; old ostomy site.  Redness to pannus, groin, scrotum.   Redness and excoriation to sacrum - otherwise intact and blanching  BLE swelling, flaky, dusky, calloused but intact  Missing R great toe  R abd with open blisters    Confirmed pressure ulcers found: n/a  New potential pressure ulcers: n/a.  The follow interventions in place: q 2 turns, barrier paste,  mepilex to heels and elbows (pt is incontinent so no mepilex to sacrum). Pt refused SCDs to BLE and refused heel float boots (due to \"neuropathy discomfort\" per pt).      "

## 2021-06-10 NOTE — CARE PLAN
Problem: Fluid Volume  Goal: Fluid volume balance will be maintained  Outcome: Progressing  IV fluids in use     Problem: Pain - Standard  Goal: Alleviation of pain or a reduction in pain to the patient’s comfort goal  Outcome: Progressing  PRN pain meds in use     The patient is Watcher - Medium risk of patient condition declining or worsening    Shift Goals  Clinical Goals: pain control; increase ROM and get to EOB  Patient Goals: rest; increase mobility; pain manage    Progress made toward(s) clinical / shift goals:  Yes.  Pt does need to continue working with physical therapy for strengthening and stability, and to increase ambulation.

## 2021-06-10 NOTE — PROGRESS NOTES
[facility] Medicine Daily Progress Note    Date of Service  6/10/2021    Chief Complaint  56 y.o. male admitted 2/9/2021 with Syncope.    Hospital Course    56-year-old male with history of atrial fibrillation, diabetes, adrenal insufficiency, depression and obesity presented 2/9 with syncope and bradycardia with hypotension, patient was on diltiazem, digoxin and metoprolol which all were held, had improved and resumed metoprolol and digoxin with later switched to amiodarone by cardiologist, Eliquis was started for anticoagulation.    He also had a possible history of adrenal insufficiency, he was already on Florinef and midodrine.  Midodrine was discontinued and he was started on cortisol.     Further work-up showed that he had gram-negative bacteremia as well as urinary tract infection and acute kidney injury.  He was initially placed on IV Zosyn, later changed to oral Bactrim to finish the course.  He was carefully hydrated with IV fluids.      On admission he also expressed suicidal ideation.  Psychiatry was consulted the case, he was placed on a legal hold.  He was noted to have severe depression.  He has been placed on medication the form of Geodon, Paxil, Klonopin.  He has a colostomy in place, which the patient believes contributes to his severe depression because of ostomy care.        He had history of bowel perforation and splenic fluid collection requiring Segmental colectomy, colostomy creation, mobilization of splenic flexure, wound VAC placement, irrigation and debridement of flank wound on 11/10/2020.  Surgery was consulted on the case, and he underwent exploratory laparotomy and colostomy takedown and closure on 5/27/2021.    Patient lives by himself and is not able to take care of himself at this time, PT and OT evaluated the patient and recommended SNF    Interval Problem Update  -Evaluated examined the patient at bedside, feels weakness, however no fever or chills and no events last  night.  -Continue improving on kidney function, continue IV fluids insulin  -No edema or crackles and she is on room air  -Improving on leukocytosis  -Wound VAC in place  -Working for SNF placement      Consultants/Specialty  Cardiology  Psychiatry  Surgery    Code Status  Full Code    Disposition  Legal Hold  SNF vs Inpatient psychiatry vs Group home  Patient needs help for daily activities  Patient is medically cleared      Review of Systems  Review of Systems   Constitutional: Positive for malaise/fatigue. Negative for chills, diaphoresis and fever.   HENT: Negative for congestion, hearing loss and sore throat.    Eyes: Negative for blurred vision.   Respiratory: Negative for cough, shortness of breath and wheezing.    Cardiovascular: Negative for chest pain, palpitations and leg swelling.   Gastrointestinal: Negative for abdominal pain, constipation, diarrhea, heartburn, nausea and vomiting.   Genitourinary: Negative for dysuria, flank pain and hematuria.   Musculoskeletal: Negative for back pain, joint pain, myalgias and neck pain.   Skin: Negative for rash.   Neurological: Positive for weakness. Negative for dizziness, sensory change, speech change, focal weakness and headaches.   Psychiatric/Behavioral: Negative for depression, hallucinations and suicidal ideas. The patient is not nervous/anxious.       Physical Exam  Temp:  [36.2 °C (97.1 °F)-36.6 °C (97.9 °F)] 36.2 °C (97.2 °F)  Pulse:  [] 96  Resp:  [16-18] 18  BP: ()/(56-76) 99/67  SpO2:  [92 %-97 %] 92 %    Physical Exam  Vitals and nursing note reviewed.   Constitutional:       Appearance: He is obese.   HENT:      Head: Normocephalic and atraumatic.      Nose: No congestion.      Mouth/Throat:      Mouth: Mucous membranes are moist.   Eyes:      Extraocular Movements: Extraocular movements intact.      Conjunctiva/sclera: Conjunctivae normal.      Pupils: Pupils are equal, round, and reactive to light.   Cardiovascular:      Rate and  Rhythm: Tachycardia present. Rhythm irregular.   Pulmonary:      Effort: Pulmonary effort is normal. No respiratory distress.      Breath sounds: Rales present. No wheezing.   Abdominal:      General: There is no distension.      Tenderness: There is no abdominal tenderness. There is no guarding or rebound.      Comments: Wound vac in place    Musculoskeletal:      Cervical back: No muscular tenderness.      Right lower leg: No edema.      Left lower leg: No edema.   Skin:     General: Skin is warm and dry.      Findings: No bruising.   Neurological:      General: No focal deficit present.      Mental Status: He is alert and oriented to person, place, and time.      Cranial Nerves: No cranial nerve deficit.      Motor: No weakness.   Psychiatric:         Mood and Affect: Mood is not anxious.         Speech: Speech normal.         Thought Content: Thought content is not paranoid or delusional. Thought content does not include homicidal or suicidal ideation. Thought content does not include homicidal or suicidal plan.         Judgment: Judgment is not impulsive.     Is agreeable to Lovenox so RVE, with RV lead insulin if he explained that comfort any risk is on her home  Fluids    Intake/Output Summary (Last 24 hours) at 6/10/2021 1449  Last data filed at 6/10/2021 1125  Gross per 24 hour   Intake 360 ml   Output 775 ml   Net -415 ml       Laboratory  Recent Labs     06/08/21  0408 06/09/21  0424 06/10/21  0405   WBC 12.5* 13.5* 11.1*   RBC 4.45* 4.46* 4.21*   HEMOGLOBIN 12.6* 12.6* 12.3*   HEMATOCRIT 40.5* 40.7* 38.6*   MCV 91.0 91.3 91.7   MCH 28.3 28.3 29.2   MCHC 31.1* 31.0* 31.9*   RDW 49.8 50.8* 51.5*   PLATELETCT 312 358 318   MPV 10.4 10.6 9.8     Recent Labs     06/08/21  0408 06/09/21  0424 06/10/21  0405   SODIUM 135 138 136   POTASSIUM 3.5* 3.5* 3.7   CHLORIDE 106 108 107   CO2 21 22 21   GLUCOSE 111* 103* 83   BUN 22 19 18   CREATININE 2.05* 1.75* 1.59*   CALCIUM 8.3* 8.4* 8.0*                    Imaging  US-RENAL   Final Result      1.  Low-level echogenic debris in the urinary bladder is noted which could indicate UTI or blood within the bladder.      2.  No hydronephrosis.      IR-MIDLINE CATHETER INSERTION WO GUIDANCE > AGE 3   Final Result                  Ultrasound-guided midline placement performed by qualified nursing staff    as above.          EC-ECHOCARDIOGRAM COMPLETE W/ CONT   Final Result      DX-CHEST-PORTABLE (1 VIEW)   Final Result      1.  Minor asymmetric interstitial opacity in the right lower lobe which could represent minor interstitial edema, pneumonia, or fibrotic change.      2.  No left lung consolidation.      3.  No evidence of congestive failure.      [id number] VIEW   Final Result         No specific finding to suggest small bowel obstruction.      [id number] VIEW   Final Result         No significant interval change.      DX-CHEST-LIMITED (1 VIEW)   Final Result         Diffuse interstitial prominence could relate to mild pulmonary edema or atypical infection      [id number] VIEW   Final Result      Increased colonic gas without definite bowel obstruction.      US-RENAL   Final Result      No evidence of hydronephrosis.      Lobulated kidneys bilaterally.      CT-ABDOMEN-PELVIS WITH   Final Result      1.  There is no evidence of small bowel obstruction.   2.  There is a left lower quadrant ostomy.   3.  There is fluid distention of the colon distal to the surgical material in the left mid descending colon extending all the way to the rectum. There is no pneumatosis or free air.   4.  There is cholelithiasis without biliary dilatation.   5.  There has been interval removal of the drainage catheter anterior to the spleen with minimal hypodense area in the anterior spleen again noted. There is no new fluid collection.      IR-US GUIDED PIV   Final Result    Ultrasound-guided PERIPHERAL IV INSERTION performed by    qualified nursing staff as above.       EC-ECHOCARDIOGRAM COMPLETE W/O CONT   Final Result      DX-LUMBAR SPINE-2 OR 3 VIEWS   Final Result      Moderate compression deformity of T11 is new compared to 2018.      Mild wedge deformity of T12 is unchanged.      Degenerative changes including facet arthropathy.      Mild retrolisthesis of L5 on S1 and L3 on L4.              Assessment/Plan  Leukocytosis- (present on admission)  Assessment & Plan  Around 12-13  No fever or chills  Continue his medications  Consider repeat CT scan for abdomen if needed    Colostomy present (HCC)- (present on admission)  Assessment & Plan  Exploratory laparotomy with colostomy takedown and closure 5/27/2021  Segmental colectomy, colostomy creation, mobilization of splenic flexure, wound VAC placement, irrigation and debridement of flank wound on 11/10/2020.  Improving on his leukocytosis  Continue antibiotics  No fever or chills and wound VAC in place   working for SNF        SRUTHI (acute kidney injury) (HCC)- (present on admission)  Assessment & Plan  6/1- resolved   FeNa showed prerenal  IV fluid hold Lasix    Debility- (present on admission)  Assessment & Plan  PT and OT; SNF  vit d level low 5/29- start replacement     Obstructive uropathy- (present on admission)  Assessment & Plan  Bladder scan and straight cath prn    Permanent atrial fibrillation (HCC)- (present on admission)  Assessment & Plan  6/7- a-fib persistent. Cardiology consulted again to start amiodarone   Stop digoxin high risk toxicity with SRUTHI   Stop metoprolol since hypotensive   Tele monitoring  Continue eliquis     Chronic venous insufficiency of lower extremity- (present on admission)  Assessment & Plan  compression stockings.    Chronic heart failure with preserved ejection fraction (HCC)- (present on admission)  Assessment & Plan  Toprol  follow probnp  6/7- cardiology will follow. BNP elevated. Cannot give lasix with SRUTHI     Type 2 diabetes mellitus with hyperglycemia, with long-term current use of  insulin (HCC)- (present on admission)  Assessment & Plan  Follow bmp  Currently not requiring Insulin    Major depressive disorder, recurrent, severe with psychotic features (HCC)- (present on admission)  Assessment & Plan  Geodon, Paxil, Klonopin  Psychiatry following     Hypothyroidism- (present on admission)  Assessment & Plan  Increased Levothyroxine to 125 mcg  Check TSH on 6/29/2021    Adrenal insufficiency (HCC)- (present on admission)  Assessment & Plan  Possible?  Goal to reduce corticef 5 mg BID 6/12  And then corticef 5 mg daily on 7/4  He need endo chronology evaluation     Suicidal ideation- (present on admission)  Assessment & Plan  6/4- legal hold discontinued.   Possible SNF placement     Orthostatic hypotension- (present on admission)  Assessment & Plan  Stop florinef. Consider midodrine once heart rate improving   Florinef increase risk of water retention, poor wound healing and he is already on hydrocortisone   Stop metoprolol or other BP meds      Mixed hyperlipidemia- (present on admission)  Assessment & Plan  Atorvastatin.     Hypomagnesemia- (present on admission)  Assessment & Plan  6/7- replace again,. borderline low. Ongoing diarrhea     Class 2 severe obesity with serious comorbidity and body mass index (BMI) of 35.0 to 35.9 in adult (HCC)- (present on admission)  Assessment & Plan  Body mass index is 35.19 kg/m².    Hypokalemia- (present on admission)  Assessment & Plan  Start Kdur  follow bmp  Replace properly     Lower limb amputation, great toe (HCC)- (present on admission)  Assessment & Plan  Monitor  No signs of infection        VTE prophylaxis: eliquis       Interventions to be considered in all patients in order to minimize the risk of delirium.   -do not disturb patient (vitals or lab draws) between the hours of 10 PM and 6 AM.  -ideally the patient should not sleep during the day and we should avoid day time naps.   -up in chair for meals  -ambulate at least three times daily, as  able  -watch for constipation  -timed voiding - ask patient is she would like to go to the bathroom q 2-3 hours, except during the do not disturb hours.   -remove all necessary lines (central lines, peripheral IVs, feeding tubes, frye catheters)  -unless patient has shown harm to self or others I would recommend against use of restraints - either chemical or physical (antipsychotics)   -minimize polypharmacy, do not dose medication during sleep hours

## 2021-06-10 NOTE — CARE PLAN
Problem: Fall Risk  Goal: Patient will remain free from falls  Outcome: Progressing    Fall education provided, pt utilizes call light, and fall prec in place.      Problem: Knowledge Deficit - Standard  Goal: Patient and family/care givers will demonstrate understanding of plan of care, disease process/condition, diagnostic tests and medications  Outcome: Progressing    Plan of care discussed and pts concerns addressed.        The patient is Stable - Low risk of patient condition declining or worsening    Shift Goals  Clinical Goals: Increase mobility   Patient Goals: Rest and pain control   Family Goals: n/a    Progress made toward(s) clinical / shift goals:  Pt active in turns in bed, PT/OT involved, and per pt pain controlled.     Patient is not progressing towards the following goals:

## 2021-06-11 LAB
BASOPHILS # BLD AUTO: 0.8 % (ref 0–1.8)
BASOPHILS # BLD: 0.1 K/UL (ref 0–0.12)
EOSINOPHIL # BLD AUTO: 0.53 K/UL (ref 0–0.51)
EOSINOPHIL NFR BLD: 4.5 % (ref 0–6.9)
ERYTHROCYTE [DISTWIDTH] IN BLOOD BY AUTOMATED COUNT: 52.4 FL (ref 35.9–50)
HCT VFR BLD AUTO: 37 % (ref 42–52)
HGB BLD-MCNC: 11.7 G/DL (ref 14–18)
IMM GRANULOCYTES # BLD AUTO: 0.09 K/UL (ref 0–0.11)
IMM GRANULOCYTES NFR BLD AUTO: 0.8 % (ref 0–0.9)
LYMPHOCYTES # BLD AUTO: 3.06 K/UL (ref 1–4.8)
LYMPHOCYTES NFR BLD: 25.8 % (ref 22–41)
MCH RBC QN AUTO: 28.5 PG (ref 27–33)
MCHC RBC AUTO-ENTMCNC: 31.6 G/DL (ref 33.7–35.3)
MCV RBC AUTO: 90.2 FL (ref 81.4–97.8)
MONOCYTES # BLD AUTO: 0.89 K/UL (ref 0–0.85)
MONOCYTES NFR BLD AUTO: 7.5 % (ref 0–13.4)
NEUTROPHILS # BLD AUTO: 7.2 K/UL (ref 1.82–7.42)
NEUTROPHILS NFR BLD: 60.6 % (ref 44–72)
NRBC # BLD AUTO: 0 K/UL
NRBC BLD-RTO: 0 /100 WBC
PLATELET # BLD AUTO: 353 K/UL (ref 164–446)
PMV BLD AUTO: 10.1 FL (ref 9–12.9)
RBC # BLD AUTO: 4.1 M/UL (ref 4.7–6.1)
WBC # BLD AUTO: 11.9 K/UL (ref 4.8–10.8)

## 2021-06-11 PROCEDURE — 99232 SBSQ HOSP IP/OBS MODERATE 35: CPT | Performed by: INTERNAL MEDICINE

## 2021-06-11 PROCEDURE — 97535 SELF CARE MNGMENT TRAINING: CPT

## 2021-06-11 PROCEDURE — 97530 THERAPEUTIC ACTIVITIES: CPT

## 2021-06-11 PROCEDURE — A9270 NON-COVERED ITEM OR SERVICE: HCPCS | Performed by: INTERNAL MEDICINE

## 2021-06-11 PROCEDURE — 700102 HCHG RX REV CODE 250 W/ 637 OVERRIDE(OP): Performed by: STUDENT IN AN ORGANIZED HEALTH CARE EDUCATION/TRAINING PROGRAM

## 2021-06-11 PROCEDURE — 770020 HCHG ROOM/CARE - TELE (206)

## 2021-06-11 PROCEDURE — 700102 HCHG RX REV CODE 250 W/ 637 OVERRIDE(OP): Performed by: FAMILY MEDICINE

## 2021-06-11 PROCEDURE — 700111 HCHG RX REV CODE 636 W/ 250 OVERRIDE (IP): Performed by: INTERNAL MEDICINE

## 2021-06-11 PROCEDURE — 700102 HCHG RX REV CODE 250 W/ 637 OVERRIDE(OP): Performed by: HOSPITALIST

## 2021-06-11 PROCEDURE — 85025 COMPLETE CBC W/AUTO DIFF WBC: CPT

## 2021-06-11 PROCEDURE — 700102 HCHG RX REV CODE 250 W/ 637 OVERRIDE(OP): Performed by: SURGERY

## 2021-06-11 PROCEDURE — 700102 HCHG RX REV CODE 250 W/ 637 OVERRIDE(OP): Performed by: INTERNAL MEDICINE

## 2021-06-11 PROCEDURE — A9270 NON-COVERED ITEM OR SERVICE: HCPCS | Performed by: HOSPITALIST

## 2021-06-11 PROCEDURE — A9270 NON-COVERED ITEM OR SERVICE: HCPCS | Performed by: SURGERY

## 2021-06-11 PROCEDURE — 97608 NEG PRS WND THER NDME>50SQCM: CPT

## 2021-06-11 PROCEDURE — A9270 NON-COVERED ITEM OR SERVICE: HCPCS | Performed by: FAMILY MEDICINE

## 2021-06-11 PROCEDURE — A9270 NON-COVERED ITEM OR SERVICE: HCPCS | Performed by: NURSE PRACTITIONER

## 2021-06-11 PROCEDURE — 302146: Performed by: INTERNAL MEDICINE

## 2021-06-11 PROCEDURE — A9270 NON-COVERED ITEM OR SERVICE: HCPCS | Performed by: STUDENT IN AN ORGANIZED HEALTH CARE EDUCATION/TRAINING PROGRAM

## 2021-06-11 PROCEDURE — 700105 HCHG RX REV CODE 258: Performed by: INTERNAL MEDICINE

## 2021-06-11 PROCEDURE — 700102 HCHG RX REV CODE 250 W/ 637 OVERRIDE(OP): Performed by: NURSE PRACTITIONER

## 2021-06-11 RX ORDER — LORAZEPAM 1 MG/1
0.5 TABLET ORAL
Status: DISCONTINUED | OUTPATIENT
Start: 2021-06-11 | End: 2021-06-15

## 2021-06-11 RX ADMIN — HYDROMORPHONE HYDROCHLORIDE 0.5 MG: 1 INJECTION, SOLUTION INTRAMUSCULAR; INTRAVENOUS; SUBCUTANEOUS at 09:16

## 2021-06-11 RX ADMIN — CLONAZEPAM 0.5 MG: 0.5 TABLET ORAL at 08:25

## 2021-06-11 RX ADMIN — Medication 1000 UNITS: at 06:27

## 2021-06-11 RX ADMIN — SODIUM CHLORIDE: 9 INJECTION, SOLUTION INTRAVENOUS at 11:00

## 2021-06-11 RX ADMIN — ZIPRASIDONE HYDROCHLORIDE 60 MG: 60 CAPSULE ORAL at 20:48

## 2021-06-11 RX ADMIN — DOXYCYCLINE 100 MG: 100 TABLET, FILM COATED ORAL at 17:15

## 2021-06-11 RX ADMIN — APIXABAN 5 MG: 5 TABLET, FILM COATED ORAL at 17:16

## 2021-06-11 RX ADMIN — OXYCODONE 10 MG: 5 TABLET ORAL at 20:47

## 2021-06-11 RX ADMIN — METRONIDAZOLE 500 MG: 500 TABLET ORAL at 14:14

## 2021-06-11 RX ADMIN — ATORVASTATIN CALCIUM 40 MG: 40 TABLET, FILM COATED ORAL at 17:16

## 2021-06-11 RX ADMIN — HYDROCORTISONE 5 MG: 10 TABLET ORAL at 14:14

## 2021-06-11 RX ADMIN — METRONIDAZOLE 500 MG: 500 TABLET ORAL at 20:47

## 2021-06-11 RX ADMIN — OXYCODONE 10 MG: 5 TABLET ORAL at 14:13

## 2021-06-11 RX ADMIN — ZIPRASIDONE HYDROCHLORIDE 60 MG: 60 CAPSULE ORAL at 08:26

## 2021-06-11 RX ADMIN — CIPROFLOXACIN HYDROCHLORIDE 500 MG: 500 TABLET, FILM COATED ORAL at 06:27

## 2021-06-11 RX ADMIN — CLONAZEPAM 1 MG: 1 TABLET ORAL at 17:15

## 2021-06-11 RX ADMIN — CIPROFLOXACIN HYDROCHLORIDE 500 MG: 500 TABLET, FILM COATED ORAL at 17:15

## 2021-06-11 RX ADMIN — PAROXETINE HYDROCHLORIDE 20 MG: 20 TABLET, FILM COATED ORAL at 08:25

## 2021-06-11 RX ADMIN — OXYCODONE 10 MG: 5 TABLET ORAL at 00:47

## 2021-06-11 RX ADMIN — METRONIDAZOLE 500 MG: 500 TABLET ORAL at 06:27

## 2021-06-11 RX ADMIN — POTASSIUM CHLORIDE 40 MEQ: 1500 TABLET, EXTENDED RELEASE ORAL at 06:27

## 2021-06-11 RX ADMIN — APIXABAN 5 MG: 5 TABLET, FILM COATED ORAL at 06:28

## 2021-06-11 RX ADMIN — HYDROCORTISONE 10 MG: 10 TABLET ORAL at 06:27

## 2021-06-11 RX ADMIN — DOXYCYCLINE 100 MG: 100 TABLET, FILM COATED ORAL at 06:28

## 2021-06-11 RX ADMIN — LEVOTHYROXINE SODIUM 125 MCG: 0.12 TABLET ORAL at 06:28

## 2021-06-11 RX ADMIN — AMIODARONE HYDROCHLORIDE 200 MG: 200 TABLET ORAL at 17:16

## 2021-06-11 RX ADMIN — AMIODARONE HYDROCHLORIDE 200 MG: 200 TABLET ORAL at 06:27

## 2021-06-11 RX ADMIN — OXYCODONE 10 MG: 5 TABLET ORAL at 09:16

## 2021-06-11 ASSESSMENT — COGNITIVE AND FUNCTIONAL STATUS - GENERAL
DRESSING REGULAR UPPER BODY CLOTHING: A LITTLE
HELP NEEDED FOR BATHING: A LOT
STANDING UP FROM CHAIR USING ARMS: TOTAL
MOBILITY SCORE: 6
CLIMB 3 TO 5 STEPS WITH RAILING: TOTAL
DAILY ACTIVITIY SCORE: 15
MOVING TO AND FROM BED TO CHAIR: UNABLE
SUGGESTED CMS G CODE MODIFIER DAILY ACTIVITY: CK
TURNING FROM BACK TO SIDE WHILE IN FLAT BAD: UNABLE
WALKING IN HOSPITAL ROOM: TOTAL
DRESSING REGULAR LOWER BODY CLOTHING: A LOT
MOVING FROM LYING ON BACK TO SITTING ON SIDE OF FLAT BED: UNABLE
SUGGESTED CMS G CODE MODIFIER MOBILITY: CN
TOILETING: TOTAL
PERSONAL GROOMING: A LITTLE

## 2021-06-11 ASSESSMENT — GAIT ASSESSMENTS
GAIT LEVEL OF ASSIST: UNABLE TO PARTICIPATE
DISTANCE (FEET): 3

## 2021-06-11 ASSESSMENT — ENCOUNTER SYMPTOMS
WHEEZING: 0
VOMITING: 0
DEPRESSION: 0
HEADACHES: 0
PALPITATIONS: 0
HALLUCINATIONS: 0
SENSORY CHANGE: 0
NERVOUS/ANXIOUS: 0
WEAKNESS: 1
SHORTNESS OF BREATH: 0
NAUSEA: 0
SPEECH CHANGE: 0
HEARTBURN: 0
CONSTIPATION: 0
BLURRED VISION: 0
DIARRHEA: 0
CHILLS: 0
MYALGIAS: 0
FEVER: 0
BACK PAIN: 0
COUGH: 0
DIAPHORESIS: 0
DIZZINESS: 0
FLANK PAIN: 0
SORE THROAT: 0
ABDOMINAL PAIN: 0
NECK PAIN: 0
FOCAL WEAKNESS: 0

## 2021-06-11 ASSESSMENT — PAIN DESCRIPTION - PAIN TYPE
TYPE: SURGICAL PAIN;ACUTE PAIN
TYPE: ACUTE PAIN;SURGICAL PAIN
TYPE: SURGICAL PAIN
TYPE: SURGICAL PAIN;ACUTE PAIN
TYPE: SURGICAL PAIN

## 2021-06-11 NOTE — CARE PLAN
Problem: Nutritional:  Goal: Achieve adequate nutritional intake  Description: Patient will consume >50% of meals  6/11/2021 0945 by Usha Jones, Student  Outcome: Progressing    PO intake >% x 3.  RD following.

## 2021-06-11 NOTE — DISCHARGE PLANNING
Agency/Facility Name: [facility]  Spoke To: Sathish  Outcome: Referral sent over to business office for possible declination due to suicidal ideations     Agency/Facility Name: [facility]   Outcome: Per EPIC response, Pt declined due to medicaid    Agency/Facility Name: [facility]   Outcome: Per EPIC response, PT declined due to medicaid

## 2021-06-11 NOTE — CARE PLAN
Problem: Psychosocial  Goal: Patient's ability to identify and develop effective coping behaviors will improve  Outcome: Progressing  Pt is pleasant, in a good mood and conversing appropriately. No verbalization of emotional distress.     Problem: Pain - Standard  Goal: Alleviation of pain or a reduction in pain to the patient’s comfort goal  Outcome: Progressing  PRN pain meds in use     The patient is Watcher - Medium risk of patient condition declining or worsening    Shift Goals  Clinical Goals: pain control; increase mobilization  Patient Goals: rest; increase mobility; pain management      Progress made toward(s) clinical / shift goals:  partially.    Patient is not progressing towards the following goals: ambulation. Pt has yet to be able to ambulate.

## 2021-06-11 NOTE — THERAPY
Physical Therapy   Daily Treatment     Patient Name: Sebastián Castro  Age:  56 y.o., Sex:  male  Medical Record #: [id number]  Today's Date: 6/11/2021     Precautions:  Fall Risk    Assessment  Pt with mild improvement in mobility and activity tolerance during today's session. Continues to require MAX/Total assist for bed mobility and to come EOB. Able to achieve bottom clearance for STS EOB with raised bed and MOD A with 4ww for UE support. Unable to weight shift for gait training/ambulation. Pt with bowel incontinence standing EOB and unable to stop and required extensive cleaning/toileting with rolling in bed with complete bedding change and 2 person assist for toileting/bedding. Repositioned in bed. Pt limited by fatigue and weakness and due to habitus, difficult to assist with all mobility. Still at risk for further functional decline and would continue to benefit. Continue POC as tolerated.     Plan    Continue current treatment plan.    DC Equipment Recommendations:  Unable to determine at this time  Discharge Recommendations:  Recommend post-acute placement for additional physical therapy services prior to discharge home      Subjective  Pt agreeable to PT session.      Objective  MAX/Total for bed mobility, MODA STS EOB 3 trials with difficulty extending LEs/hips/back, unable to weight shift for gait.      06/11/21 1044   Total Time Spent   Total Time Spent (Mins) 30   Charge Group   Charges  Yes   PT Therapeutic Activities 2   Precautions   Precautions Fall Risk   Comments Abd wound vac   Pain 0 - 10 Group   Therapist Pain Assessment Post Activity Pain Same as Prior to Activity  (stomach aching)   Non Verbal Descriptors   Non Verbal Scale  Calm   Cognition    Level of Consciousness Alert   Comments Agreeable   Balance   Sitting Balance (Static) Fair   Sitting Balance (Dynamic) Fair -   Standing Balance (Static) Poor   Standing Balance (Dynamic) Poor -   Weight Shift Sitting Poor   Weight Shift Standing Poor    Skilled Intervention Verbal Cuing;Tactile Cuing;Postural Facilitation   Comments 4ww   Gait Analysis   Gait Level Of Assist Unable to Participate  (cannot weight shift to take steps)   Bed Mobility    Supine to Sit Maximal Assist   Sit to Supine Maximal Assist   Scooting Total Assist   Rolling Maximal Assist to Rt.;Maximum Assist to Lt.   Skilled Intervention Verbal Cuing;Tactile Cuing;Sequencing;Postural Facilitation   Comments Heavy cueing, draw sheet used   Functional Mobility   Sit to Stand Moderate Assist  (raised bed)   Bed, Chair, Wheelchair Transfer Unable to Participate   Toilet Transfers Unable to Participate   Mobility Cannot weight shift to take steps, STS EOB   Skilled Intervention Verbal Cuing;Postural Facilitation;Sequencing;Tactile Cuing   Comments 3 STS EOB MODA raised edge of bed   How much difficulty does the patient currently have...   Turning over in bed (including adjusting bedclothes, sheets and blankets)? 1   Sitting down on and standing up from a chair with arms (e.g., wheelchair, bedside commode, etc.) 1   Moving from lying on back to sitting on the side of the bed? 1   How much help from another person does the patient currently need...   Moving to and from a bed to a chair (including a wheelchair)? 1   Need to walk in a hospital room? 1   Climbing 3-5 steps with a railing? 1   6 clicks Mobility Score 6   Activity Tolerance   Sitting Edge of Bed 15   Standing 4   Patient / Family Goals    Patient / Family Goal #1 walk again   Goal #1 Outcome Goal not met   Short Term Goals    Short Term Goal # 1 Pt will perform supine<->sit with supv within 6 visits in order to return home   Goal Outcome # 1 goal not met   Short Term Goal # 2 Pt will transfer bed<->chair with 4WW with supv within 6 visits in order to return home   Goal Outcome # 2 Goal not met   Short Term Goal # 3 Pt will ambulate 100' with supv within 6 visits in order to return home   Goal Outcome # 3 Goal not met   Short Term Goal #  4 Pt will ascend/descend 5 steps with supv within 6 visits in order to enter/exit home   Goal Outcome # 4 Goal not met   Education Group   Education Provided Role of Physical Therapist   Role of Physical Therapist Patient Response Patient;Acceptance;Explanation;Verbal Demonstration   Anticipated Discharge Equipment and Recommendations   DC Equipment Recommendations Unable to determine at this time   Discharge Recommendations Recommend post-acute placement for additional physical therapy services prior to discharge home   Interdisciplinary Plan of Care Collaboration   IDT Collaboration with  Nursing   Patient Position at End of Therapy In Bed;Call Light within Reach;Tray Table within Reach;Phone within Reach;Bed Alarm On   Collaboration Comments RN Updated   Session Information   Date / Session Number  6/11 -4(1/3, 6/15)

## 2021-06-11 NOTE — PROGRESS NOTES
2 RN skin check complete  Devices in place: tele box, and wound vac to midline abd  Skin assessed under devices: n/a  LLQ scab; old ostomy site.  Redness to pannus, groin, scrotum.   Redness and excoriation to sacrum - otherwise intact and blanching  BLE swelling, flaky, dusky, calloused but intact  Missing R great toe     Confirmed pressure ulcers found: n/a  New potential pressure ulcers: n/a.  The follow interventions in place: q 2 turns, barrier paste,  mepilex to heels and elbows (pt is incontinent so no mepilex to sacrum). Pt refused SCDs to BLE and refused heel float boots.

## 2021-06-11 NOTE — THERAPY
Occupational Therapy  Daily Treatment     Patient Name: Sebastián Castro  Age:  56 y.o., Sex:  male  Medical Record #: [id number]  Today's Date: 6/11/2021     Precautions  Precautions: (P) Fall Risk  Comments: (P) abdo wound vacin place    Assessment    Agreeable to participate. Limited by constant oozing BM. STS x 3 tolerated, no commode available, so returned to supine for extensive BM clean up. Seated weight shift, balance activity tolerated with mod cueing. Fatigued very quickly during activity, unable to complete third attempt without considerable assistance.    Plan    Continue current treatment plan.    DC Equipment Recommendations: (P) Unable to determine at this time  Discharge Recommendations: (P) Recommend post-acute placement for additional occupational therapy services prior to discharge home    Subjective    Pleasant and cooperative     Objective       06/11/21 1150   Total Time Spent   Total Time Spent (Mins) 55   Treatment Charges   Charges Yes   OT Self Care / ADL 3   OT Therapy Activity 1   Precautions   Precautions Fall Risk   Comments abdo wound vacin place   Vitals   O2 Delivery Device None - Room Air   Pain 0 - 10 Group   Therapist Pain Assessment During Activity;Post Activity Pain Same as Prior to Activity;Nurse Notified  (mild stomach ache, did not rate)   Cognition    Level of Consciousness Alert   New Learning Impaired   Attention Impaired   Comments pleasant, agreeable, cooperative   Level of Consciousness Alert   Passive ROM Upper Body   Passive ROM Upper Body WDL   Active ROM Upper Body   Active ROM Upper Body  WDL   Dominant Hand Right   Strength Upper Body   Gross Strength Generalized Weakness, Equal Bilaterally.    Sensation Upper Body   Upper Extremity Sensation  WDL   Upper Body Muscle Tone   Upper Body Muscle Tone  WDL   Neuro-Muscular Treatments   Neuro-Muscular Treatments Anterior weight shift;Weight Shift Right;Weight Shift Left   Other Treatments   Other Treatments Provided full  linen change due to constantly oozing BM during session   Balance   Sitting Balance (Static) Fair   Sitting Balance (Dynamic) Fair -   Standing Balance (Static) Poor   Standing Balance (Dynamic) Poor -   Weight Shift Sitting Poor   Weight Shift Standing Poor   Skilled Intervention Verbal Cuing;Sequencing;Tactile Cuing   Comments using 4WW   Bed Mobility    Supine to Sit Maximal Assist   Sit to Supine Maximal Assist   Scooting Total Assist  (unable to unweight bottom)   Rolling Maximal Assist to Rt.;Maximum Assist to Lt.   Skilled Intervention Verbal Cuing;Tactile Cuing;Sequencing   Comments extensive cueing required   Activities of Daily Living   Eating Modified Independent   Grooming Supervision;Seated   Bathing Maximal Assist   Upper Body Dressing Minimal Assist   Lower Body Dressing Maximal Assist   Toileting Total Assist   How much help from another person does the patient currently need...   Putting on and taking off regular lower body clothing? 2   Bathing (including washing, rinsing, and drying)? 2   Toileting, which includes using a toilet, bedpan, or urinal? 1   Putting on and taking off regular upper body clothing? 3   Taking care of personal grooming such as brushing teeth? 3   Eating meals? 4   6 Clicks Daily Activity Score 15   Functional Mobility   Sit to Stand Moderate Assist  (3 attempts, bed height raised)   Bed, Chair, Wheelchair Transfer Unable to Participate   Transfer Method Stand Step   Distance (Feet) 3   # of Times Distance was Traveled 1   Skilled Intervention Verbal Cuing;Tactile Cuing;Sequencing   Visual Perception   Visual Perception  WDL   Activity Tolerance   Sitting Edge of Bed 15 mins   Standing 4 mins total   Comments limited by fatigue   Patient / Family Goals   Patient / Family Goal #1 To feel better   Goal #1 Outcome Progressing slower than expected   Short Term Goals   Short Term Goal # 1 Pt will perform LB dressing w/ min A   Goal Outcome # 1 Progressing slower than expected    Short Term Goal # 2 Pt will perform functional t/f's with supervision   Goal Outcome # 2 Progressing slower than expected   Short Term Goal # 3 Pt will perform h/g standing sink side with supervision   Goal Outcome # 3 Progressing slower than expected   Education Group   Role of Occupational Therapist Patient Response Patient;Acceptance;Explanation;Demonstration;Verbal Demonstration;Reinforcement Needed   Transfers Patient Response Patient;Acceptance;Explanation;Demonstration;Verbal Demonstration;Reinforcement Needed   ADL Patient Response Patient;Acceptance;Explanation;Demonstration;Verbal Demonstration;Reinforcement Needed   Anticipated Discharge Equipment and Recommendations   DC Equipment Recommendations Unable to determine at this time   Discharge Recommendations Recommend post-acute placement for additional occupational therapy services prior to discharge home   Interdisciplinary Plan of Care Collaboration   IDT Collaboration with  Nursing   Patient Position at End of Therapy In Bed;Bed Alarm On;Call Light within Reach;Tray Table within Reach;Phone within Reach   Collaboration Comments report given   Session Information   Date / Session Number  6/11,4 ( 1/3,6/16)   Priority 2

## 2021-06-11 NOTE — PROGRESS NOTES
Received care of pt from NOC RN this am. Pt is A+O x 4. On a Q 2 hr turn and repositioning schedule.

## 2021-06-11 NOTE — PROGRESS NOTES
Surgery    Pt seen with wound care today  VAC taken down  Wound still moist with fair amount of slough on R and upper midline.    Transition to Veriflo VAC.  May need debridement : will reassess Monday

## 2021-06-11 NOTE — WOUND TEAM
[facility]  Inpatient Services  Wound and Skin Care Progress Note    Admission Date: 2/9/2021     Last order of IP CONSULT TO WOUND CARE was found on 3/18/2021 from Hospital Encounter on 2/9/2021     HPI, PMH, SH: Reviewed    Past Surgical History:   Procedure Laterality Date   • PB EXPLORATORY OF ABDOMEN  5/27/2021    Procedure: LAPAROTOMY, EXPLORATORY;  Surgeon: Kin Desouza D.O.;  Location: [facility];  Service: General   • COLOSTOMY TAKEDOWN  5/27/2021    Procedure: COLOSTOMY TAKEDOWN AND CLOSURE;  Surgeon: Kin Desouza D.O.;  Location: SURGERY [facility];  Service: General   • COLECTOMY N/A 11/10/2020    Procedure: COLECTOMY-SEGMENTAL, COLOSTOMY, MOBILIZATION OF SPLENIC FLEXURE, WOUND VAC PLACEMENT, IRRIGATION AND DEBRIDMENT FLANK WOUND;  Surgeon: Kin Desouza D.O.;  Location: [facility];  Service: General   • ZZZ CARDIAC CATH  07/21/2018    EF 45%, normal coronaries.   • IRRIGATION & DEBRIDEMENT ORTHO Right 2/19/2018    Procedure: IRRIGATION & DEBRIDEMENT ORTHO-FOOT;  Surgeon: Kirby Lopez M.D.;  Location: [facility];  Service: Orthopedics   • TOE AMPUTATION Right 1/17/2018    Procedure: TOE AMPUTATION-1ST RAY;  Surgeon: Doug Delong M.D.;  Location: [facility];  Service: Orthopedics   • TOE AMPUTATION Right 11/10/2017    Procedure: TOE AMPUTATION;  Surgeon: Osorio Leo M.D.;  Location: [facility];  Service: Orthopedics     Social History     Tobacco Use   • Smoking status: Never Smoker   • Smokeless tobacco: Never Used   Substance Use Topics   • Alcohol use: No     Chief Complaint   Patient presents with   • Syncope     X1 this morning when standing up to go to bathroom     Diagnosis: Syncope and collapse [R55]  Suicidal ideation [R45.851]  Sepsis due to Klebsiella pneumoniae (HCC) [A41.4]    Unit where seen by Wound Team: T418/00     WOUND CONSULT/FOLLOW UP RELATED TO: abdominal wound    WOUND HISTORY:  Pt is an older  gentleman well known to [facility] wound team 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 Pt currently admitted since 2/9/21 related to SI concerns due to body image issues related to colostomy.      *Pt underwent surgical reversal of colostomy with Dr. Desouza on 5/20/21. MLI was re-opened at that time and was then closed with interrupted staples and packing. Unfortunately MLI continued to Dehisce and therefore wound team was consulted to further manage MLI with dakins packing.     6/3 Wound vac placed    WOUND ASSESSMENT/LDA     Negative Pressure Wound Therapy 06/03/21 Abdomen (Active)   Vacuum Serial Number [id number]    NPWT Pump Mode / Pressure Setting     Dressing Type Black Foam (Veraflo);Gray Foam (Cleanse Choice)    Number of Foam Pieces Used 6    Canister Changed No    Output (mL) 125 mL    NEXT Dressing Change/Treatment Date 06/14/21    VAC VeraFlo Irrigant Normal Saline    VAC VeraFlo Soak Time (mins) 10    VAC VeraFlo Instill Volume (ml) 30    VAC VeraFlo - Therapy Time (hrs) 2    VAC VeraFlo Pressure (mm/Hg) Intermittent;125 mmHg    WOUND NURSE ONLY - Time Spent with Patient (mins) 60          Wound 05/27/21 Full Thickness Wound Abdomen (Active)   Wound Image   06/05/21 0900   Site Assessment Pink;Red;Yellow    Periwound Assessment Intact    Margins Attached edges    Closure Secondary intention    Drainage Amount Scant    Drainage Description Serosanguineous    Treatments Cleansed;Site care    Wound Cleansing Approved Wound Cleanser    Periwound Protectant Paste Ring;Skin Protectant Wipes to Periwound    Dressing Cleansing/Solutions Normal Saline    Dressing Options Wound Vac    Dressing Changed Changed    Dressing Status Clean;Dry;Intact    Dressing Change/Treatment Frequency Monday, Wednesday, Friday, and As Needed    NEXT Dressing Change/Treatment Date 06/14/21    NEXT Weekly Photo (Inpatient Only) 06/16/21    Non-staged Wound Description Full thickness    Wound Length (cm) 20.3 cm     Wound Width (cm) 7.3 cm    Wound Depth (cm) 2 cm    Wound Surface Area (cm^2) 148.19 cm^2    Wound Volume (cm^3) 296.38 cm^3    Wound Healing % -262    Tunneling (cm) 1 cm    Tunneling Clock Position of Wound 12    Tunneling - 2nd Location (cm) 0.7 cm    Tunneling Clock Position of Wound - 2nd Location 6    Shape irregular    Wound Odor None    Pulses N/A    Exposed Structures None    WOUND NURSE ONLY - Time Spent with Patient (mins) 60      Vascular:    SHAYAN:   No results found.    Lab Values:    Lab Results   Component Value Date/Time    WBC 11.9 (H) 2021 12:50 AM    RBC 4.10 (L) 2021 12:50 AM    HEMOGLOBIN 11.7 (L) 2021 12:50 AM    HEMATOCRIT 37.0 (L) 2021 12:50 AM    CREACTPROT 0.48 06/10/2021 04:05 AM    SEDRATEWES 13 10/23/2018 08:02 AM    HBA1C 5.2 2021 01:30 AM      Culture Results show:  No results found for this or any previous visit (from the past 720 hour(s)).    Pain Level/Medicated:  IV Morphine immediately prior to dressing change      INTERVENTIONS BY WOUND TEAM:  Chart and images reviewed. Discussed with bedside RN. All areas of concern (based on picture review, LDA review and discussion with bedside RN) have been thoroughly assessed. Documentation of areas based on significant findings. This RN in to assess patient. Performed standard wound care which includes appropriate positioning, dressing removal and non-selective debridement. Pictures and measurements obtained weekly if/when required.              ABD:  Preparation for Dressing removal:  Without difficulty   Cleansed with: Wound cleanser and gauze   Sharp debridement: scant debridement with forceps and scissors   Joya wound: Cleansed with Wound cleanser and gauze, No sting skin and paste ring x 3, mepitel one to distal and proximal staples.   Primary Dressin pieces of grey waffle layer to fascial line sutures, remaining wound bed filled with VF black foam, edges pulled together and sealed with drape.    Secondary (Outer) Dressing:  Sealed with drape, TRAC pad applied.     Interdisciplinary consultation: Patient, Bedside RN (Yahaira), Dr. Desouza      EVALUATION / RATIONALE FOR TREATMENT:  Most Recent Date:  06/11/21:  Wound looks good, minimal slough mainly to fascial line sutures.  VF grey foam layer to assist in debridement of slough.  Dr. Desouza to re-assess Monday and do surgical debridement if needed next week.     06/09/21:  Wound continues to progress, pull edges together to lessen width of wound. Dr. Desouza would like to see wound on Friday 6/7: Pt's wound appears to be improving per measurements and pictures. There is granulation tissue present.   06/5/21: Wound bed improving with increased granulation tissue.  Wound edges approximated prior to drape application.  NPWT continued.    06/03/21:  Fascial sutures intact, wound VAC placed with DR. Desouza.    6/1/21: Wound appears  than yesterdays pictures. Discussed with Dr. Desouza, plan to continue dakins TID for 48hrs, CT scan ordered today. Will re-evaluate on Thursday for potential vac placement.   5/31/21: Wound initially with interrupted staples and packing now with dehiscence. Per Dr. Baeza request wound team initiated dakins packing. Pt may benefit from NPWT to assist with closure.   5/8/21: wound nearly healed. Does not require advanced wound care. Wound team signing off and nursing to re consult if site worsens. Continue with dressing care per order.  4/21/21: abd wound improving. Nursing to continue dsg changes per order.  4/15: Right posterior thigh DTI now resolved. Pink and blanching. Abdominal wound to surface level continuing with dressing POC.  4/7/21: Thigh wound nearly healed and will likely be resolved at next assessment. Abdomen wound appears larger. Continued with Arti to move through inflammatory phase. Hydrofera blue for its bacteriostatic properties and to assist in drainage management.   03/31/21: thigh wound healing well  now to a stage 2.  Continue current plan.  All interventions in place.   ABD more denuded and bloody.  Arti to assist in moving wound through inflammatory phase.   3/23/21: Right posterior thigh wound remains a DTI but appears to be evolving into a stage 2. Wound team was previously consulted regarding sacral wound which appeared to be a skin tear however skin now appears to be intact however discolored. Does not appear to be pressure related as pt is on a waffle, self mobile and sacral mepilex is in use. Area is also not a normal pressure injury shape. Wound team will continue to follow.   3/18/21: Pt with linear DTIs to R posterior thigh from lying on top of frye cath tubing. Offloading precautions ordered. Wound team to continue following.     Goals: Steady decrease in wound area and depth weekly.    WOUND TEAM PLAN OF CARE ([X] for frequency of wound follow up,):   Nursing to follow orders written for wound care. Contact wound team if area fails to progress, deteriorates or with any questions/concerns  Dressing changes by wound team:                   Follow up 3 times weekly:                NPWT change 3 times weekly:   X  Follow up 1-2 times weekly:     Follow up Bi-Monthly:                   Follow up as needed:   Other (explain):     NURSING PLAN OF CARE ORDERS (X):  Dressing changes: See Dressing Care orders: X  Skin care: See Skin Care orders: x  RN Prevention Protocol: x  Rectal tube care: See Rectal Tube Care orders:   Other orders:        Anticipated discharge plans:   LTACH:        SNF/Rehab:                  Home Health Care:           Outpatient Wound Center:            Self/Family Care:        Other:   Group Home

## 2021-06-12 LAB
ANION GAP SERPL CALC-SCNC: 9 MMOL/L (ref 7–16)
BASOPHILS # BLD AUTO: 0.5 % (ref 0–1.8)
BASOPHILS # BLD: 0.07 K/UL (ref 0–0.12)
BUN SERPL-MCNC: 16 MG/DL (ref 8–22)
CALCIUM SERPL-MCNC: 8.7 MG/DL (ref 8.5–10.5)
CHLORIDE SERPL-SCNC: 108 MMOL/L (ref 96–112)
CO2 SERPL-SCNC: 22 MMOL/L (ref 20–33)
CREAT SERPL-MCNC: 1.66 MG/DL (ref 0.5–1.4)
EOSINOPHIL # BLD AUTO: 0.54 K/UL (ref 0–0.51)
EOSINOPHIL NFR BLD: 3.7 % (ref 0–6.9)
ERYTHROCYTE [DISTWIDTH] IN BLOOD BY AUTOMATED COUNT: 52.4 FL (ref 35.9–50)
GLUCOSE SERPL-MCNC: 114 MG/DL (ref 65–99)
HCT VFR BLD AUTO: 38.4 % (ref 42–52)
HGB BLD-MCNC: 12 G/DL (ref 14–18)
IMM GRANULOCYTES # BLD AUTO: 0.12 K/UL (ref 0–0.11)
IMM GRANULOCYTES NFR BLD AUTO: 0.8 % (ref 0–0.9)
LYMPHOCYTES # BLD AUTO: 2.28 K/UL (ref 1–4.8)
LYMPHOCYTES NFR BLD: 15.6 % (ref 22–41)
MAGNESIUM SERPL-MCNC: 1.4 MG/DL (ref 1.5–2.5)
MCH RBC QN AUTO: 28.5 PG (ref 27–33)
MCHC RBC AUTO-ENTMCNC: 31.3 G/DL (ref 33.7–35.3)
MCV RBC AUTO: 91.2 FL (ref 81.4–97.8)
MONOCYTES # BLD AUTO: 1.15 K/UL (ref 0–0.85)
MONOCYTES NFR BLD AUTO: 7.9 % (ref 0–13.4)
NEUTROPHILS # BLD AUTO: 10.48 K/UL (ref 1.82–7.42)
NEUTROPHILS NFR BLD: 71.5 % (ref 44–72)
NRBC # BLD AUTO: 0 K/UL
NRBC BLD-RTO: 0 /100 WBC
PHOSPHATE SERPL-MCNC: 3.6 MG/DL (ref 2.5–4.5)
PLATELET # BLD AUTO: 363 K/UL (ref 164–446)
PMV BLD AUTO: 10.5 FL (ref 9–12.9)
POTASSIUM SERPL-SCNC: 4 MMOL/L (ref 3.6–5.5)
RBC # BLD AUTO: 4.21 M/UL (ref 4.7–6.1)
SODIUM SERPL-SCNC: 139 MMOL/L (ref 135–145)
WBC # BLD AUTO: 14.6 K/UL (ref 4.8–10.8)

## 2021-06-12 PROCEDURE — A9270 NON-COVERED ITEM OR SERVICE: HCPCS | Performed by: STUDENT IN AN ORGANIZED HEALTH CARE EDUCATION/TRAINING PROGRAM

## 2021-06-12 PROCEDURE — 700102 HCHG RX REV CODE 250 W/ 637 OVERRIDE(OP): Performed by: INTERNAL MEDICINE

## 2021-06-12 PROCEDURE — 770001 HCHG ROOM/CARE - MED/SURG/GYN PRIV*

## 2021-06-12 PROCEDURE — 700102 HCHG RX REV CODE 250 W/ 637 OVERRIDE(OP): Performed by: HOSPITALIST

## 2021-06-12 PROCEDURE — 51798 US URINE CAPACITY MEASURE: CPT

## 2021-06-12 PROCEDURE — A9270 NON-COVERED ITEM OR SERVICE: HCPCS | Performed by: FAMILY MEDICINE

## 2021-06-12 PROCEDURE — A9270 NON-COVERED ITEM OR SERVICE: HCPCS | Performed by: INTERNAL MEDICINE

## 2021-06-12 PROCEDURE — 80048 BASIC METABOLIC PNL TOTAL CA: CPT

## 2021-06-12 PROCEDURE — 700102 HCHG RX REV CODE 250 W/ 637 OVERRIDE(OP): Performed by: STUDENT IN AN ORGANIZED HEALTH CARE EDUCATION/TRAINING PROGRAM

## 2021-06-12 PROCEDURE — 99232 SBSQ HOSP IP/OBS MODERATE 35: CPT | Performed by: STUDENT IN AN ORGANIZED HEALTH CARE EDUCATION/TRAINING PROGRAM

## 2021-06-12 PROCEDURE — A9270 NON-COVERED ITEM OR SERVICE: HCPCS | Performed by: NURSE PRACTITIONER

## 2021-06-12 PROCEDURE — 700102 HCHG RX REV CODE 250 W/ 637 OVERRIDE(OP): Performed by: SURGERY

## 2021-06-12 PROCEDURE — 84100 ASSAY OF PHOSPHORUS: CPT

## 2021-06-12 PROCEDURE — 700111 HCHG RX REV CODE 636 W/ 250 OVERRIDE (IP): Performed by: STUDENT IN AN ORGANIZED HEALTH CARE EDUCATION/TRAINING PROGRAM

## 2021-06-12 PROCEDURE — 700102 HCHG RX REV CODE 250 W/ 637 OVERRIDE(OP): Performed by: NURSE PRACTITIONER

## 2021-06-12 PROCEDURE — A9270 NON-COVERED ITEM OR SERVICE: HCPCS | Performed by: SURGERY

## 2021-06-12 PROCEDURE — 85025 COMPLETE CBC W/AUTO DIFF WBC: CPT

## 2021-06-12 PROCEDURE — 700102 HCHG RX REV CODE 250 W/ 637 OVERRIDE(OP): Performed by: FAMILY MEDICINE

## 2021-06-12 PROCEDURE — 700105 HCHG RX REV CODE 258: Performed by: STUDENT IN AN ORGANIZED HEALTH CARE EDUCATION/TRAINING PROGRAM

## 2021-06-12 PROCEDURE — A9270 NON-COVERED ITEM OR SERVICE: HCPCS | Performed by: HOSPITALIST

## 2021-06-12 PROCEDURE — 83735 ASSAY OF MAGNESIUM: CPT

## 2021-06-12 RX ORDER — SODIUM CHLORIDE 9 MG/ML
INJECTION, SOLUTION INTRAVENOUS CONTINUOUS
Status: DISCONTINUED | OUTPATIENT
Start: 2021-06-12 | End: 2021-06-15

## 2021-06-12 RX ORDER — MAGNESIUM SULFATE HEPTAHYDRATE 40 MG/ML
2 INJECTION, SOLUTION INTRAVENOUS ONCE
Status: COMPLETED | OUTPATIENT
Start: 2021-06-12 | End: 2021-06-12

## 2021-06-12 RX ADMIN — CIPROFLOXACIN HYDROCHLORIDE 500 MG: 500 TABLET, FILM COATED ORAL at 05:13

## 2021-06-12 RX ADMIN — PAROXETINE HYDROCHLORIDE 20 MG: 20 TABLET, FILM COATED ORAL at 10:30

## 2021-06-12 RX ADMIN — OXYCODONE 10 MG: 5 TABLET ORAL at 00:17

## 2021-06-12 RX ADMIN — DOXYCYCLINE 100 MG: 100 TABLET, FILM COATED ORAL at 05:14

## 2021-06-12 RX ADMIN — APIXABAN 5 MG: 5 TABLET, FILM COATED ORAL at 05:13

## 2021-06-12 RX ADMIN — HYDROCORTISONE 5 MG: 10 TABLET ORAL at 15:32

## 2021-06-12 RX ADMIN — METRONIDAZOLE 500 MG: 500 TABLET ORAL at 05:13

## 2021-06-12 RX ADMIN — HYDROCORTISONE 10 MG: 10 TABLET ORAL at 05:14

## 2021-06-12 RX ADMIN — ATORVASTATIN CALCIUM 40 MG: 40 TABLET, FILM COATED ORAL at 18:38

## 2021-06-12 RX ADMIN — OXYCODONE 10 MG: 5 TABLET ORAL at 21:56

## 2021-06-12 RX ADMIN — AMIODARONE HYDROCHLORIDE 200 MG: 200 TABLET ORAL at 18:38

## 2021-06-12 RX ADMIN — SODIUM CHLORIDE: 9 INJECTION, SOLUTION INTRAVENOUS at 23:54

## 2021-06-12 RX ADMIN — APIXABAN 5 MG: 5 TABLET, FILM COATED ORAL at 18:38

## 2021-06-12 RX ADMIN — MAGNESIUM SULFATE 2 G: 2 INJECTION INTRAVENOUS at 10:30

## 2021-06-12 RX ADMIN — METRONIDAZOLE 500 MG: 500 TABLET ORAL at 15:32

## 2021-06-12 RX ADMIN — CIPROFLOXACIN HYDROCHLORIDE 500 MG: 500 TABLET, FILM COATED ORAL at 18:38

## 2021-06-12 RX ADMIN — LEVOTHYROXINE SODIUM 125 MCG: 0.12 TABLET ORAL at 05:13

## 2021-06-12 RX ADMIN — OXYCODONE 10 MG: 5 TABLET ORAL at 10:36

## 2021-06-12 RX ADMIN — METRONIDAZOLE 500 MG: 500 TABLET ORAL at 21:56

## 2021-06-12 RX ADMIN — CLONAZEPAM 0.5 MG: 0.5 TABLET ORAL at 10:30

## 2021-06-12 RX ADMIN — AMIODARONE HYDROCHLORIDE 200 MG: 200 TABLET ORAL at 05:13

## 2021-06-12 RX ADMIN — CLONAZEPAM 1 MG: 1 TABLET ORAL at 18:38

## 2021-06-12 RX ADMIN — Medication 1000 UNITS: at 05:13

## 2021-06-12 RX ADMIN — DOXYCYCLINE 100 MG: 100 TABLET, FILM COATED ORAL at 18:37

## 2021-06-12 RX ADMIN — ZIPRASIDONE HYDROCHLORIDE 60 MG: 60 CAPSULE ORAL at 21:56

## 2021-06-12 RX ADMIN — OXYCODONE 10 MG: 5 TABLET ORAL at 05:13

## 2021-06-12 RX ADMIN — SODIUM CHLORIDE: 9 INJECTION, SOLUTION INTRAVENOUS at 18:40

## 2021-06-12 RX ADMIN — ZIPRASIDONE HYDROCHLORIDE 60 MG: 60 CAPSULE ORAL at 10:29

## 2021-06-12 ASSESSMENT — PAIN DESCRIPTION - PAIN TYPE
TYPE: CHRONIC PAIN
TYPE: SURGICAL PAIN
TYPE: CHRONIC PAIN
TYPE: SURGICAL PAIN
TYPE: SURGICAL PAIN

## 2021-06-12 ASSESSMENT — ENCOUNTER SYMPTOMS
FOCAL WEAKNESS: 0
COUGH: 0
FLANK PAIN: 0
CHILLS: 0
HALLUCINATIONS: 0
WEAKNESS: 1
BLURRED VISION: 0
DIARRHEA: 0
WHEEZING: 0
BACK PAIN: 0
HEADACHES: 0
VOMITING: 0
DIZZINESS: 0
NECK PAIN: 0
DIAPHORESIS: 0
ABDOMINAL PAIN: 0
CONSTIPATION: 0
FEVER: 0
SORE THROAT: 0
NERVOUS/ANXIOUS: 0
SPEECH CHANGE: 0
SENSORY CHANGE: 0
HEARTBURN: 0
DEPRESSION: 0
MYALGIAS: 0
NAUSEA: 0
SHORTNESS OF BREATH: 0
PALPITATIONS: 0

## 2021-06-12 NOTE — PROGRESS NOTES
[facility] Medicine Daily Progress Note    Date of Service  6/12/2021    Chief Complaint  56 y.o. male admitted 2/9/2021 with Syncope.    Hospital Course  56-year-old male with history of atrial fibrillation, diabetes, adrenal insufficiency, depression and obesity presented 2/9 with syncope and bradycardia with hypotension. Patient was on diltiazem, digoxin and metoprolol which all were held; once, bradycardia had improved and resumed metoprolol and digoxin with later switched to amiodarone by cardiologist, Eliquis was started for anticoagulation.    He also had a possible history of adrenal insufficiency, he was already on Florinef and midodrine.  Midodrine was discontinued and he was started on cortisol.     Further work-up showed that he had gram-negative bacteremia as well as urinary tract infection and acute kidney injury.  He was initially placed on IV Zosyn, later changed to oral Bactrim to finish the course.  He was carefully hydrated with IV fluids.      On admission, he also expressed suicidal ideation.  Psychiatry was consulted the case, he was placed on a legal hold.  He was noted to have severe depression.  He has been placed on medication the form of Geodon, Paxil, Klonopin.  He has a colostomy in place, which the patient believes contributes to his severe depression because of ostomy care.      He had history of bowel perforation and splenic fluid collection requiring Segmental colectomy, colostomy creation, mobilization of splenic flexure, wound VAC placement, irrigation and debridement of flank wound on 11/10/2020.  Surgery was consulted on the case, and he underwent exploratory laparotomy and colostomy takedown and closure on 5/27/2021. Pt was followed by wound care post op with wound vac interval changes.    Patient lives by himself and is not able to take care of himself at this time, PT and OT evaluated the patient and recommended SNF    Interval Problem Update  6/12  Vitals reviewed; afebrile.   The rest of the vitals within normal parameters.  Pain scale reported - 6-7 in abdomen  Blood glucose in last 24hrs - around 100s   I/O - about 450cc wound vac output, good urine output    At bedside, appeared quite comfortable. Pleasant on interaction. Discussed plan for rehab and pending placement. Still have abd pain but reported manageable.     Labs reviewed   Cr 1.59 > 1.66  WBC 14.6  Mg 1.4 (supplemented)    Consultants/Specialty  Cardiology  Psychiatry  Surgery    Code Status  Full Code    Disposition  SNF placement pending   Patient needs help for daily activities  Patient is medically cleared    Discussed with patient, patient's nurse and with multidisciplinary team during rounds including , pharmacist and charge nurse.      Review of Systems  Review of Systems   Constitutional: Positive for malaise/fatigue. Negative for chills, diaphoresis and fever.   HENT: Negative for congestion, hearing loss and sore throat.    Eyes: Negative for blurred vision.   Respiratory: Negative for cough, shortness of breath and wheezing.    Cardiovascular: Negative for chest pain, palpitations and leg swelling.   Gastrointestinal: Negative for abdominal pain, constipation, diarrhea, heartburn, nausea and vomiting.   Genitourinary: Negative for dysuria, flank pain and hematuria.   Musculoskeletal: Negative for back pain, joint pain, myalgias and neck pain.   Skin: Negative for rash.   Neurological: Positive for weakness. Negative for dizziness, sensory change, speech change, focal weakness and headaches.   Psychiatric/Behavioral: Negative for depression, hallucinations and suicidal ideas. The patient is not nervous/anxious.       Physical Exam  Temp:  [35.8 °C (96.5 °F)-36.8 °C (98.2 °F)] 36.8 °C (98.2 °F)  Pulse:  [] 100  Resp:  [18] 18  BP: (123-135)/(70-91) 123/70  SpO2:  [93 %-96 %] 93 %    Physical Exam  Vitals and nursing note reviewed.   Constitutional:       Appearance: He is obese.   HENT:      Head:  Normocephalic and atraumatic.      Nose: No congestion.      Mouth/Throat:      Mouth: Mucous membranes are moist.   Eyes:      Extraocular Movements: Extraocular movements intact.      Conjunctiva/sclera: Conjunctivae normal.      Pupils: Pupils are equal, round, and reactive to light.   Cardiovascular:      Rate and Rhythm: Normal rate. Rhythm irregular.   Pulmonary:      Effort: Pulmonary effort is normal. No respiratory distress.      Breath sounds: No wheezing or rales.   Abdominal:      General: There is no distension.      Tenderness: There is no abdominal tenderness. There is no guarding or rebound.      Comments: Wound vac in place    Musculoskeletal:      Cervical back: No muscular tenderness.      Right lower leg: No edema.      Left lower leg: No edema.   Skin:     General: Skin is warm and dry.      Findings: No bruising.   Neurological:      General: No focal deficit present.      Mental Status: He is alert and oriented to person, place, and time.      Cranial Nerves: No cranial nerve deficit.      Motor: No weakness.   Psychiatric:         Mood and Affect: Mood is not anxious.         Speech: Speech normal.         Thought Content: Thought content is not paranoid or delusional. Thought content does not include homicidal or suicidal ideation. Thought content does not include homicidal or suicidal plan.         Judgment: Judgment is not impulsive.       Fluids    Intake/Output Summary (Last 24 hours) at 6/12/2021 1554  Last data filed at 6/12/2021 0510  Gross per 24 hour   Intake 600 ml   Output 1075 ml   Net -475 ml       Laboratory  Recent Labs     06/10/21  0405 06/11/21  0050 06/12/21  0030   WBC 11.1* 11.9* 14.6*   RBC 4.21* 4.10* 4.21*   HEMOGLOBIN 12.3* 11.7* 12.0*   HEMATOCRIT 38.6* 37.0* 38.4*   MCV 91.7 90.2 91.2   MCH 29.2 28.5 28.5   MCHC 31.9* 31.6* 31.3*   RDW 51.5* 52.4* 52.4*   PLATELETCT 318 353 363   MPV 9.8 10.1 10.5     Recent Labs     06/10/21  0405 06/12/21  0030   SODIUM 136 139    POTASSIUM 3.7 4.0   CHLORIDE 107 108   CO2 21 22   GLUCOSE 83 114*   BUN 18 16   CREATININE 1.59* 1.66*   CALCIUM 8.0* 8.7                   Imaging  US-RENAL   Final Result      1.  Low-level echogenic debris in the urinary bladder is noted which could indicate UTI or blood within the bladder.      2.  No hydronephrosis.      IR-MIDLINE CATHETER INSERTION WO GUIDANCE > AGE 3   Final Result                  Ultrasound-guided midline placement performed by qualified nursing staff    as above.          EC-ECHOCARDIOGRAM COMPLETE W/ CONT   Final Result      DX-CHEST-PORTABLE (1 VIEW)   Final Result      1.  Minor asymmetric interstitial opacity in the right lower lobe which could represent minor interstitial edema, pneumonia, or fibrotic change.      2.  No left lung consolidation.      3.  No evidence of congestive failure.      [id number] VIEW   Final Result         No specific finding to suggest small bowel obstruction.      AD-UNACHXU-9 VIEW   Final Result         No significant interval change.      DX-CHEST-LIMITED (1 VIEW)   Final Result         Diffuse interstitial prominence could relate to mild pulmonary edema or atypical infection      [id number] VIEW   Final Result      Increased colonic gas without definite bowel obstruction.      US-RENAL   Final Result      No evidence of hydronephrosis.      Lobulated kidneys bilaterally.      CT-ABDOMEN-PELVIS WITH   Final Result      1.  There is no evidence of small bowel obstruction.   2.  There is a left lower quadrant ostomy.   3.  There is fluid distention of the colon distal to the surgical material in the left mid descending colon extending all the way to the rectum. There is no pneumatosis or free air.   4.  There is cholelithiasis without biliary dilatation.   5.  There has been interval removal of the drainage catheter anterior to the spleen with minimal hypodense area in the anterior spleen again noted. There is no new fluid collection.      IR-US  GUIDED PIV   Final Result    Ultrasound-guided PERIPHERAL IV INSERTION performed by    qualified nursing staff as above.      EC-ECHOCARDIOGRAM COMPLETE W/O CONT   Final Result      DX-LUMBAR SPINE-2 OR 3 VIEWS   Final Result      Moderate compression deformity of T11 is new compared to 2018.      Mild wedge deformity of T12 is unchanged.      Degenerative changes including facet arthropathy.      Mild retrolisthesis of L5 on S1 and L3 on L4.              Assessment/Plan  * Debility- (present on admission)  Assessment & Plan  PT and OT; SNF  vit d level low 5/29- start replacement   Patient has generalized weakness  Poor prognosis    Leukocytosis- (present on admission)  Assessment & Plan  Around 12-13  No fever or chills  Continue his medications  Consider repeat CT scan for abdomen if needed    Obstructive uropathy- (present on admission)  Assessment & Plan  Bladder scan and straight cath prn    Permanent atrial fibrillation (HCC)- (present on admission)  Assessment & Plan  6/7- a-fib persistent. Cardiology consulted again to start amiodarone   Stop digoxin high risk toxicity with SRUTHI   Stop metoprolol since hypotensive   Tele monitoring  Continue eliquis     Chronic venous insufficiency of lower extremity- (present on admission)  Assessment & Plan  compression stockings.    Chronic heart failure with preserved ejection fraction (HCC)- (present on admission)  Assessment & Plan  Toprol on hold due to low BP and orthostatic hypo  C/w Atorvastatin  Monitor fluid status and Cr    Type 2 diabetes mellitus with hyperglycemia, with long-term current use of insulin (HCC)- (present on admission)  Assessment & Plan  Follow bmp  Currently not requiring Insulin    Major depressive disorder, recurrent, severe with psychotic features (HCC)- (present on admission)  Assessment & Plan  Geodon, Paxil, Klonopin  Psychiatry following     Colostomy present (HCC)- (present on admission)  Assessment & Plan  Exploratory laparotomy with  colostomy takedown and closure 5/27/2021  Segmental colectomy, colostomy creation, mobilization of splenic flexure, wound VAC placement, irrigation and debridement of flank wound on 11/10/2020.  Improving on his leukocytosis  No fever or chills and wound VAC in place   Continue oral antibiotics  working for SNF        Hypothyroidism- (present on admission)  Assessment & Plan  Increased Levothyroxine to 125 mcg  Check TSH on 6/29/2021    SRUTHI (acute kidney injury) ([facility])- (present on admission)  Assessment & Plan  6/1- resolved   FeNa showed prerenal  IV fluid hold Lasix    Adrenal insufficiency ([facility])- (present on admission)  Assessment & Plan  Likely  Goal to reduce corticef 5 mg BID 6/12  And then corticef 5 mg daily on 7/4  He need endo evaluation as outpatient    Suicidal ideation- (present on admission)  Assessment & Plan  6/4- legal hold discontinued.   Possible SNF placement     Orthostatic hypotension- (present on admission)  Assessment & Plan  Stop florinef. Consider midodrine once heart rate improving   Florinef increase risk of water retention, poor wound healing and he is already on hydrocortisone   Stop metoprolol or other BP meds      Mixed hyperlipidemia- (present on admission)  Assessment & Plan  Atorvastatin.     Lower limb amputation, great toe ([facility])- (present on admission)  Assessment & Plan  Monitor  No signs of infection     Hypomagnesemia- (present on admission)  Assessment & Plan  Monitor and replace as needed     Class 2 severe obesity with serious comorbidity and body mass index (BMI) of 35.0 to 35.9 in adult ([facility])- (present on admission)  Assessment & Plan  Body mass index is 35.19 kg/m².    Hypokalemia- (present on admission)  Assessment & Plan  Monitor and replete as needed       VTE prophylaxis: eliquis       Interventions to be considered in all patients in order to minimize the risk of delirium.   -do not disturb patient (vitals or lab draws) between the hours of 10 PM and 6 AM.  -ideally  the patient should not sleep during the day and we should avoid day time naps.   -up in chair for meals  -ambulate at least three times daily, as able  -watch for constipation  -timed voiding - ask patient is she would like to go to the bathroom q 2-3 hours, except during the do not disturb hours.   -remove all necessary lines (central lines, peripheral IVs, feeding tubes, frye catheters)  -unless patient has shown harm to self or others I would recommend against use of restraints - either chemical or physical (antipsychotics)   -minimize polypharmacy, do not dose medication during sleep hours

## 2021-06-12 NOTE — PROGRESS NOTES
2 RN skin check complete     LLQ scab; old ostomy site.  Redness to pannus, groin, scrotum.   Redness and excoriation to sacrum - otherwise intact and blanching  BLE swelling, flaky, dusky, calloused but intact  Missing R great toe     Confirmed pressure ulcers found: n/a  New potential pressure ulcers: n/a.  The follow interventions in place: q 2 turns, barrier paste, barrier cream, interdry,  mepilex to heels and elbows (pt is incontinent so no mepilex to sacrum).  Pt refused SCDs to BLE and refused heel float boots.

## 2021-06-12 NOTE — CARE PLAN
Problem: Bowel Elimination  Goal: Establish and maintain regular bowel function  Outcome: Not Met      Problem: Psychosocial  Goal: Patient's ability to identify and develop effective coping behaviors will improve  Outcome: Progressing  Pt is pleasant, in a good mood. No verbalization of emotional distress.     Problem: Pain - Standard  Goal: Alleviation of pain or a reduction in pain to the patient’s comfort goal  Outcome: Progressing  PRN pain meds in use      The patient is Watcher - Medium risk of patient condition declining or worsening     Shift Goals  Clinical Goals: pain control; increase mobilization  Patient Goals: rest; increase mobility; pain management      Progress made toward(s) clinical / shift goals:  partially.     Patient is not progressing towards the following goals: ambulation. Pt has not yet ambulated. Pt is also still having loose bowel movements.

## 2021-06-12 NOTE — PROGRESS NOTES
[facility] Medicine Daily Progress Note    Date of Service  6/11/2021    Chief Complaint  56 y.o. male admitted 2/9/2021 with Syncope.    Hospital Course    56-year-old male with history of atrial fibrillation, diabetes, adrenal insufficiency, depression and obesity presented 2/9 with syncope and bradycardia with hypotension, patient was on diltiazem, digoxin and metoprolol which all were held, had improved and resumed metoprolol and digoxin with later switched to amiodarone by cardiologist, Eliquis was started for anticoagulation.    He also had a possible history of adrenal insufficiency, he was already on Florinef and midodrine.  Midodrine was discontinued and he was started on cortisol.     Further work-up showed that he had gram-negative bacteremia as well as urinary tract infection and acute kidney injury.  He was initially placed on IV Zosyn, later changed to oral Bactrim to finish the course.  He was carefully hydrated with IV fluids.      On admission he also expressed suicidal ideation.  Psychiatry was consulted the case, he was placed on a legal hold.  He was noted to have severe depression.  He has been placed on medication the form of Geodon, Paxil, Klonopin.  He has a colostomy in place, which the patient believes contributes to his severe depression because of ostomy care.        He had history of bowel perforation and splenic fluid collection requiring Segmental colectomy, colostomy creation, mobilization of splenic flexure, wound VAC placement, irrigation and debridement of flank wound on 11/10/2020.  Surgery was consulted on the case, and he underwent exploratory laparotomy and colostomy takedown and closure on 5/27/2021.    Patient lives by himself and is not able to take care of himself at this time, PT and OT evaluated the patient and recommended SNF    Interval Problem Update  -Evaluated examined the patient at bedside, feels weakness, however no fever or chills and no events last night.  -Vital  signs are stable  -Discontinue IV fluid and telemetry  -Working for placement        Consultants/Specialty  Cardiology  Psychiatry  Surgery    Code Status  Full Code    Disposition  Legal Hold  SNF vs Inpatient psychiatry vs Group home  Patient needs help for daily activities  Patient is medically cleared      Review of Systems  Review of Systems   Constitutional: Positive for malaise/fatigue. Negative for chills, diaphoresis and fever.   HENT: Negative for congestion, hearing loss and sore throat.    Eyes: Negative for blurred vision.   Respiratory: Negative for cough, shortness of breath and wheezing.    Cardiovascular: Negative for chest pain, palpitations and leg swelling.   Gastrointestinal: Negative for abdominal pain, constipation, diarrhea, heartburn, nausea and vomiting.   Genitourinary: Negative for dysuria, flank pain and hematuria.   Musculoskeletal: Negative for back pain, joint pain, myalgias and neck pain.   Skin: Negative for rash.   Neurological: Positive for weakness. Negative for dizziness, sensory change, speech change, focal weakness and headaches.   Psychiatric/Behavioral: Negative for depression, hallucinations and suicidal ideas. The patient is not nervous/anxious.       Physical Exam  Temp:  [36 °C (96.8 °F)-36.4 °C (97.5 °F)] 36.3 °C (97.3 °F)  Pulse:  [] 104  Resp:  [16-18] 18  BP: ()/(60-71) 99/63  SpO2:  [93 %-97 %] 93 %    Physical Exam  Vitals and nursing note reviewed.   Constitutional:       Appearance: He is obese.   HENT:      Head: Normocephalic and atraumatic.      Nose: No congestion.      Mouth/Throat:      Mouth: Mucous membranes are moist.   Eyes:      Extraocular Movements: Extraocular movements intact.      Conjunctiva/sclera: Conjunctivae normal.      Pupils: Pupils are equal, round, and reactive to light.   Cardiovascular:      Rate and Rhythm: Tachycardia present. Rhythm irregular.   Pulmonary:      Effort: Pulmonary effort is normal. No respiratory distress.       Breath sounds: Rales present. No wheezing.   Abdominal:      General: There is no distension.      Tenderness: There is no abdominal tenderness. There is no guarding or rebound.      Comments: Wound vac in place    Musculoskeletal:      Cervical back: No muscular tenderness.      Right lower leg: No edema.      Left lower leg: No edema.   Skin:     General: Skin is warm and dry.      Findings: No bruising.   Neurological:      General: No focal deficit present.      Mental Status: He is alert and oriented to person, place, and time.      Cranial Nerves: No cranial nerve deficit.      Motor: No weakness.   Psychiatric:         Mood and Affect: Mood is not anxious.         Speech: Speech normal.         Thought Content: Thought content is not paranoid or delusional. Thought content does not include homicidal or suicidal ideation. Thought content does not include homicidal or suicidal plan.         Judgment: Judgment is not impulsive.     Is agreeable to Lovenox so RVE, with RV lead insulin if he explained that comfort any risk is on her home  Fluids    Intake/Output Summary (Last 24 hours) at 6/11/2021 1708  Last data filed at 6/11/2021 1605  Gross per 24 hour   Intake 790 ml   Output 1050 ml   Net -260 ml       Laboratory  Recent Labs     06/09/21  0424 06/10/21  0405 06/11/21  0050   WBC 13.5* 11.1* 11.9*   RBC 4.46* 4.21* 4.10*   HEMOGLOBIN 12.6* 12.3* 11.7*   HEMATOCRIT 40.7* 38.6* 37.0*   MCV 91.3 91.7 90.2   MCH 28.3 29.2 28.5   MCHC 31.0* 31.9* 31.6*   RDW 50.8* 51.5* 52.4*   PLATELETCT 358 318 353   MPV 10.6 9.8 10.1     Recent Labs     06/09/21  0424 06/10/21  0405   SODIUM 138 136   POTASSIUM 3.5* 3.7   CHLORIDE 108 107   CO2 22 21   GLUCOSE 103* 83   BUN 19 18   CREATININE 1.75* 1.59*   CALCIUM 8.4* 8.0*                   Imaging  US-RENAL   Final Result      1.  Low-level echogenic debris in the urinary bladder is noted which could indicate UTI or blood within the bladder.      2.  No hydronephrosis.       IR-MIDLINE CATHETER INSERTION WO GUIDANCE > AGE 3   Final Result                  Ultrasound-guided midline placement performed by qualified nursing staff    as above.          EC-ECHOCARDIOGRAM COMPLETE W/ CONT   Final Result      DX-CHEST-PORTABLE (1 VIEW)   Final Result      1.  Minor asymmetric interstitial opacity in the right lower lobe which could represent minor interstitial edema, pneumonia, or fibrotic change.      2.  No left lung consolidation.      3.  No evidence of congestive failure.      [id number]-[id number]-5 VIEW   Final Result         No specific finding to suggest small bowel obstruction.      OH-[id number]-4 VIEW   Final Result         No significant interval change.      DX-CHEST-LIMITED (1 VIEW)   Final Result         Diffuse interstitial prominence could relate to mild pulmonary edema or atypical infection      KQ-[id number]-0 VIEW   Final Result      Increased colonic gas without definite bowel obstruction.      US-RENAL   Final Result      No evidence of hydronephrosis.      Lobulated kidneys bilaterally.      CT-ABDOMEN-PELVIS WITH   Final Result      1.  There is no evidence of small bowel obstruction.   2.  There is a left lower quadrant ostomy.   3.  There is fluid distention of the colon distal to the surgical material in the left mid descending colon extending all the way to the rectum. There is no pneumatosis or free air.   4.  There is cholelithiasis without biliary dilatation.   5.  There has been interval removal of the drainage catheter anterior to the spleen with minimal hypodense area in the anterior spleen again noted. There is no new fluid collection.      IR-US GUIDED PIV   Final Result    Ultrasound-guided PERIPHERAL IV INSERTION performed by    qualified nursing staff as above.      EC-ECHOCARDIOGRAM COMPLETE W/O CONT   Final Result      DX-LUMBAR SPINE-2 OR 3 VIEWS   Final Result      Moderate compression deformity of T11 is new compared to 2018.      Mild wedge deformity of T12 is  unchanged.      Degenerative changes including facet arthropathy.      Mild retrolisthesis of L5 on S1 and L3 on L4.              Assessment/Plan  * Debility- (present on admission)  Assessment & Plan  PT and OT; SNF  vit d level low 5/29- start replacement   Patient has generalized weakness  Poor prognosis    Leukocytosis- (present on admission)  Assessment & Plan  Around 12-13  No fever or chills  Continue his medications  Consider repeat CT scan for abdomen if needed    Colostomy present (HCC)- (present on admission)  Assessment & Plan  Exploratory laparotomy with colostomy takedown and closure 5/27/2021  Segmental colectomy, colostomy creation, mobilization of splenic flexure, wound VAC placement, irrigation and debridement of flank wound on 11/10/2020.  Improving on his leukocytosis  No fever or chills and wound VAC in place   Continue oral antibiotics  working for SNF        SRUTHI (acute kidney injury) ([facility])- (present on admission)  Assessment & Plan  6/1- resolved   FeNa showed prerenal  IV fluid hold Lasix    Obstructive uropathy- (present on admission)  Assessment & Plan  Bladder scan and straight cath prn    Permanent atrial fibrillation (HCC)- (present on admission)  Assessment & Plan  6/7- a-fib persistent. Cardiology consulted again to start amiodarone   Stop digoxin high risk toxicity with SRUTHI   Stop metoprolol since hypotensive   Tele monitoring  Continue eliquis     Chronic venous insufficiency of lower extremity- (present on admission)  Assessment & Plan  compression stockings.    Chronic heart failure with preserved ejection fraction (HCC)- (present on admission)  Assessment & Plan  Toprol  follow probnp  6/7- cardiology will follow. BNP elevated. Cannot give lasix with SRUTHI     Type 2 diabetes mellitus with hyperglycemia, with long-term current use of insulin (HCC)- (present on admission)  Assessment & Plan  Follow bmp  Currently not requiring Insulin    Major depressive disorder, recurrent, severe  with psychotic features (HCC)- (present on admission)  Assessment & Plan  Geodon, Paxil, Klonopin  Psychiatry following     Hypothyroidism- (present on admission)  Assessment & Plan  Increased Levothyroxine to 125 mcg  Check TSH on 6/29/2021    Adrenal insufficiency (HCC)- (present on admission)  Assessment & Plan  Possible?  Goal to reduce corticef 5 mg BID 6/12  And then corticef 5 mg daily on 7/4  He need endo chronology evaluation     Suicidal ideation- (present on admission)  Assessment & Plan  6/4- legal hold discontinued.   Possible SNF placement     Orthostatic hypotension- (present on admission)  Assessment & Plan  Stop florinef. Consider midodrine once heart rate improving   Florinef increase risk of water retention, poor wound healing and he is already on hydrocortisone   Stop metoprolol or other BP meds      Mixed hyperlipidemia- (present on admission)  Assessment & Plan  Atorvastatin.     Hypomagnesemia- (present on admission)  Assessment & Plan  6/7- replace again,. borderline low. Ongoing diarrhea     Class 2 severe obesity with serious comorbidity and body mass index (BMI) of 35.0 to 35.9 in adult (HCC)- (present on admission)  Assessment & Plan  Body mass index is 35.19 kg/m².    Hypokalemia- (present on admission)  Assessment & Plan  Start Kdur  follow bmp  Replace properly     Lower limb amputation, great toe (HCC)- (present on admission)  Assessment & Plan  Monitor  No signs of infection        VTE prophylaxis: eliquis       Interventions to be considered in all patients in order to minimize the risk of delirium.   -do not disturb patient (vitals or lab draws) between the hours of 10 PM and 6 AM.  -ideally the patient should not sleep during the day and we should avoid day time naps.   -up in chair for meals  -ambulate at least three times daily, as able  -watch for constipation  -timed voiding - ask patient is she would like to go to the bathroom q 2-3 hours, except during the do not disturb  hours.   -remove all necessary lines (central lines, peripheral IVs, feeding tubes, frye catheters)  -unless patient has shown harm to self or others I would recommend against use of restraints - either chemical or physical (antipsychotics)   -minimize polypharmacy, do not dose medication during sleep hours

## 2021-06-12 NOTE — PROGRESS NOTES
"Assumed care of pt at 1910  A&O x 4. Room air  Patient has not yet ambulated; max assist of 2 staff members.   5/10 pain; denies numbness or tingling, and denies pain medication at this time  Denies N&V; regular diet.  Wound vac to midline abd  + void, + flatus, + BM [LBM: today].  Patient denies SOB or chest pain.  SCD's refused to BLE - per pt, \"cause pain due to neuropathy\"  "

## 2021-06-13 LAB
ANION GAP SERPL CALC-SCNC: 9 MMOL/L (ref 7–16)
BUN SERPL-MCNC: 15 MG/DL (ref 8–22)
CALCIUM SERPL-MCNC: 8.5 MG/DL (ref 8.5–10.5)
CHLORIDE SERPL-SCNC: 108 MMOL/L (ref 96–112)
CO2 SERPL-SCNC: 22 MMOL/L (ref 20–33)
CREAT SERPL-MCNC: 1.65 MG/DL (ref 0.5–1.4)
GLUCOSE SERPL-MCNC: 94 MG/DL (ref 65–99)
POTASSIUM SERPL-SCNC: 3.6 MMOL/L (ref 3.6–5.5)
SODIUM SERPL-SCNC: 139 MMOL/L (ref 135–145)

## 2021-06-13 PROCEDURE — 700105 HCHG RX REV CODE 258: Performed by: STUDENT IN AN ORGANIZED HEALTH CARE EDUCATION/TRAINING PROGRAM

## 2021-06-13 PROCEDURE — 700102 HCHG RX REV CODE 250 W/ 637 OVERRIDE(OP): Performed by: STUDENT IN AN ORGANIZED HEALTH CARE EDUCATION/TRAINING PROGRAM

## 2021-06-13 PROCEDURE — A9270 NON-COVERED ITEM OR SERVICE: HCPCS | Performed by: HOSPITALIST

## 2021-06-13 PROCEDURE — A9270 NON-COVERED ITEM OR SERVICE: HCPCS | Performed by: NURSE PRACTITIONER

## 2021-06-13 PROCEDURE — 700102 HCHG RX REV CODE 250 W/ 637 OVERRIDE(OP): Performed by: NURSE PRACTITIONER

## 2021-06-13 PROCEDURE — 700102 HCHG RX REV CODE 250 W/ 637 OVERRIDE(OP): Performed by: FAMILY MEDICINE

## 2021-06-13 PROCEDURE — 700102 HCHG RX REV CODE 250 W/ 637 OVERRIDE(OP): Performed by: SURGERY

## 2021-06-13 PROCEDURE — A9270 NON-COVERED ITEM OR SERVICE: HCPCS | Performed by: FAMILY MEDICINE

## 2021-06-13 PROCEDURE — 99232 SBSQ HOSP IP/OBS MODERATE 35: CPT | Performed by: STUDENT IN AN ORGANIZED HEALTH CARE EDUCATION/TRAINING PROGRAM

## 2021-06-13 PROCEDURE — 700102 HCHG RX REV CODE 250 W/ 637 OVERRIDE(OP): Performed by: INTERNAL MEDICINE

## 2021-06-13 PROCEDURE — 700102 HCHG RX REV CODE 250 W/ 637 OVERRIDE(OP): Performed by: HOSPITALIST

## 2021-06-13 PROCEDURE — A9270 NON-COVERED ITEM OR SERVICE: HCPCS | Performed by: STUDENT IN AN ORGANIZED HEALTH CARE EDUCATION/TRAINING PROGRAM

## 2021-06-13 PROCEDURE — A9270 NON-COVERED ITEM OR SERVICE: HCPCS | Performed by: INTERNAL MEDICINE

## 2021-06-13 PROCEDURE — A9270 NON-COVERED ITEM OR SERVICE: HCPCS | Performed by: SURGERY

## 2021-06-13 PROCEDURE — 770001 HCHG ROOM/CARE - MED/SURG/GYN PRIV*

## 2021-06-13 PROCEDURE — 80048 BASIC METABOLIC PNL TOTAL CA: CPT

## 2021-06-13 RX ORDER — HYDROCORTISONE 5 MG/1
5 TABLET ORAL 2 TIMES DAILY
Status: DISCONTINUED | OUTPATIENT
Start: 2021-06-14 | End: 2021-07-06

## 2021-06-13 RX ORDER — NYSTATIN 100000 [USP'U]/G
POWDER TOPICAL 2 TIMES DAILY
Status: COMPLETED | OUTPATIENT
Start: 2021-06-13 | End: 2021-06-18

## 2021-06-13 RX ADMIN — ZIPRASIDONE HYDROCHLORIDE 60 MG: 60 CAPSULE ORAL at 08:33

## 2021-06-13 RX ADMIN — AMIODARONE HYDROCHLORIDE 200 MG: 200 TABLET ORAL at 04:44

## 2021-06-13 RX ADMIN — AMIODARONE HYDROCHLORIDE 200 MG: 200 TABLET ORAL at 17:35

## 2021-06-13 RX ADMIN — METRONIDAZOLE 500 MG: 500 TABLET ORAL at 04:44

## 2021-06-13 RX ADMIN — SODIUM CHLORIDE: 9 INJECTION, SOLUTION INTRAVENOUS at 08:33

## 2021-06-13 RX ADMIN — CLONAZEPAM 0.5 MG: 0.5 TABLET ORAL at 08:33

## 2021-06-13 RX ADMIN — HYDROCORTISONE 10 MG: 10 TABLET ORAL at 04:44

## 2021-06-13 RX ADMIN — ATORVASTATIN CALCIUM 40 MG: 40 TABLET, FILM COATED ORAL at 17:35

## 2021-06-13 RX ADMIN — HYDROCORTISONE 5 MG: 10 TABLET ORAL at 14:39

## 2021-06-13 RX ADMIN — ZIPRASIDONE HYDROCHLORIDE 60 MG: 60 CAPSULE ORAL at 21:26

## 2021-06-13 RX ADMIN — LEVOTHYROXINE SODIUM 125 MCG: 0.12 TABLET ORAL at 04:45

## 2021-06-13 RX ADMIN — OXYCODONE 10 MG: 5 TABLET ORAL at 21:26

## 2021-06-13 RX ADMIN — SODIUM CHLORIDE: 9 INJECTION, SOLUTION INTRAVENOUS at 18:36

## 2021-06-13 RX ADMIN — APIXABAN 5 MG: 5 TABLET, FILM COATED ORAL at 17:35

## 2021-06-13 RX ADMIN — APIXABAN 5 MG: 5 TABLET, FILM COATED ORAL at 04:45

## 2021-06-13 RX ADMIN — DOXYCYCLINE 100 MG: 100 TABLET, FILM COATED ORAL at 17:35

## 2021-06-13 RX ADMIN — NYSTATIN: 100000 POWDER TOPICAL at 17:36

## 2021-06-13 RX ADMIN — CIPROFLOXACIN HYDROCHLORIDE 500 MG: 500 TABLET, FILM COATED ORAL at 04:44

## 2021-06-13 RX ADMIN — PAROXETINE HYDROCHLORIDE 20 MG: 20 TABLET, FILM COATED ORAL at 08:33

## 2021-06-13 RX ADMIN — CIPROFLOXACIN HYDROCHLORIDE 500 MG: 500 TABLET, FILM COATED ORAL at 17:35

## 2021-06-13 RX ADMIN — DOXYCYCLINE 100 MG: 100 TABLET, FILM COATED ORAL at 04:44

## 2021-06-13 RX ADMIN — METRONIDAZOLE 500 MG: 500 TABLET ORAL at 21:26

## 2021-06-13 RX ADMIN — OXYCODONE 10 MG: 5 TABLET ORAL at 14:39

## 2021-06-13 RX ADMIN — CLONAZEPAM 1 MG: 1 TABLET ORAL at 17:35

## 2021-06-13 RX ADMIN — Medication 1000 UNITS: at 04:45

## 2021-06-13 RX ADMIN — METRONIDAZOLE 500 MG: 500 TABLET ORAL at 14:39

## 2021-06-13 RX ADMIN — OXYCODONE 10 MG: 5 TABLET ORAL at 08:40

## 2021-06-13 ASSESSMENT — ENCOUNTER SYMPTOMS
SORE THROAT: 0
DEPRESSION: 0
FOCAL WEAKNESS: 0
DIAPHORESIS: 0
MYALGIAS: 0
NECK PAIN: 0
CHILLS: 0
HALLUCINATIONS: 0
VOMITING: 0
FEVER: 0
SHORTNESS OF BREATH: 0
NAUSEA: 0
SENSORY CHANGE: 0
BACK PAIN: 0
DIZZINESS: 0
CONSTIPATION: 0
ABDOMINAL PAIN: 0
FLANK PAIN: 0
WHEEZING: 0
HEADACHES: 0
DIARRHEA: 0
COUGH: 0
SPEECH CHANGE: 0
NERVOUS/ANXIOUS: 0
PALPITATIONS: 0
WEAKNESS: 1
HEARTBURN: 0
BLURRED VISION: 0

## 2021-06-13 ASSESSMENT — PAIN DESCRIPTION - PAIN TYPE
TYPE: CHRONIC PAIN

## 2021-06-13 NOTE — PROGRESS NOTES
Assessment complete.  AA&Ox4.   SpO2 96% on RA. Denies SOB.  Reporting 8/10 pain. Medicated per MAR.   Midline incision w/ wound vac.   Tolerating low fiber GI soft diet. Denies N/V.  + void.   LBM 6/12/21.   Pt up with max assist to edge of bed.   All needs met at this time. Call light within reach. Pt calls appropriately.

## 2021-06-13 NOTE — CARE PLAN
The patient is Stable - Low risk of patient condition declining or worsening      Problem: Fluid Volume  Goal: Fluid volume balance will be maintained  Outcome: Progressing     Problem: Urinary - Renal Perfusion  Goal: Ability to achieve and maintain adequate renal perfusion and functioning will improve  Outcome: Progressing     Problem: Mobility  Goal: Patient's capacity to carry out activities will improve  Outcome: Progressing       Shift Goals  Clinical Goals: mobilization  Patient Goals: pain control  Family Goals: n/a    Progress made toward(s) clinical / shift goals:  Pt is refusing mobilization. Pt output has increased since IV fluid resuscitation    Patient is not progressing towards the following goals:

## 2021-06-13 NOTE — CARE PLAN
Problem: Knowledge Deficit - Standard  Goal: Patient and family/care givers will demonstrate understanding of plan of care, disease process/condition, diagnostic tests and medications  Outcome: Progressing     Problem: Mobility  Goal: Patient's capacity to carry out activities will improve  Outcome: Progressing     The patient is Stable - Low risk of patient condition declining or worsening    Shift Goals  Clinical Goals: mobilization  Patient Goals: pain control and rest  Family Goals: n/a    Progress made toward(s) clinical / shift goals:  Pt refused mobilization but is practicing some aspects of self care independently like combing hair.    Patient is not progressing towards the following goals:

## 2021-06-13 NOTE — PROGRESS NOTES
[facility] Medicine Daily Progress Note    Date of Service  6/13/2021    Chief Complaint  56 y.o. male admitted 2/9/2021 with Syncope.    Hospital Course  56-year-old male with history of atrial fibrillation, diabetes, adrenal insufficiency, depression and obesity presented 2/9 with syncope and bradycardia with hypotension. Patient was on diltiazem, digoxin and metoprolol which all were held; once, bradycardia had improved and resumed metoprolol and digoxin with later switched to amiodarone by cardiologist, Eliquis was started for anticoagulation.    He also had a possible history of adrenal insufficiency, he was already on Florinef and midodrine.  Midodrine was discontinued and he was started on cortisol.     Further work-up showed that he had gram-negative bacteremia as well as urinary tract infection and acute kidney injury.  He was initially placed on IV Zosyn, later changed to oral Bactrim to finish the course.  He was carefully hydrated with IV fluids.      On admission, he also expressed suicidal ideation.  Psychiatry was consulted the case, he was placed on a legal hold.  He was noted to have severe depression.  He has been placed on medication the form of Geodon, Paxil, Klonopin.  He has a colostomy in place, which the patient believes contributes to his severe depression because of ostomy care.      He had history of bowel perforation and splenic fluid collection requiring Segmental colectomy, colostomy creation, mobilization of splenic flexure, wound VAC placement, irrigation and debridement of flank wound on 11/10/2020.  Surgery was consulted on the case, and he underwent exploratory laparotomy and colostomy takedown and closure on 5/27/2021. Pt was followed by wound care post op with wound vac interval changes.    Patient lives by himself and is not able to take care of himself at this time, PT and OT evaluated the patient and recommended SNF    Interval Problem Update  6/13  Vitals reviewed; afebrile.   The rest of the vitals within normal parameters.  Pain scale reported - 4 in abd  Blood glucose in last 24hrs - around 100s   I/O - 525cc wound vac output; urine output 430cc (relatively poor but bladder scan with minimal PVR).    Per nursing report, in and out of Afib but HR controlled.     At bedside, reports improving abd pain. No acute complain otherwise. Current plan to monitor his urine output as well discussed.     Labs reviewed   Cr 1.59 > 1.66 > 1.65  WBC 14.6      Consultants/Specialty  Cardiology  Psychiatry  Surgery    Code Status  Full Code    Disposition  SNF placement pending   Patient needs help for daily activities  Patient is medically cleared    Discussed with patient, patient's nurse and with multidisciplinary team during rounds including , pharmacist and charge nurse.      I have performed the physical examination, and reviewed updated ROS and plan today 6/13/2021.  In review of yesterday's note, there are no new changes except as documented above or bolded below.    Review of Systems  Review of Systems   Constitutional: Positive for malaise/fatigue. Negative for chills, diaphoresis and fever.   HENT: Negative for congestion, hearing loss and sore throat.    Eyes: Negative for blurred vision.   Respiratory: Negative for cough, shortness of breath and wheezing.    Cardiovascular: Negative for chest pain, palpitations and leg swelling.   Gastrointestinal: Negative for abdominal pain, constipation, diarrhea, heartburn, nausea and vomiting.   Genitourinary: Negative for dysuria, flank pain and hematuria.   Musculoskeletal: Negative for back pain, joint pain, myalgias and neck pain.   Skin: Negative for rash.   Neurological: Positive for weakness. Negative for dizziness, sensory change, speech change, focal weakness and headaches.   Psychiatric/Behavioral: Negative for depression, hallucinations and suicidal ideas. The patient is not nervous/anxious.       Physical Exam  Temp:  [36.1 °C  (96.9 °F)-36.6 °C (97.8 °F)] 36.2 °C (97.1 °F)  Pulse:  [] 101  Resp:  [16-18] 18  BP: (112-136)/(63-90) 131/83  SpO2:  [92 %-96 %] 92 %    Physical Exam  Vitals and nursing note reviewed.   Constitutional:       Appearance: He is obese.   HENT:      Head: Normocephalic and atraumatic.      Nose: No congestion.      Mouth/Throat:      Mouth: Mucous membranes are moist.   Eyes:      Extraocular Movements: Extraocular movements intact.      Conjunctiva/sclera: Conjunctivae normal.      Pupils: Pupils are equal, round, and reactive to light.   Cardiovascular:      Rate and Rhythm: Normal rate. Rhythm irregular.   Pulmonary:      Effort: Pulmonary effort is normal. No respiratory distress.      Breath sounds: No wheezing or rales.   Abdominal:      General: There is no distension.      Tenderness: There is no abdominal tenderness. There is no guarding or rebound.      Comments: Wound vac in place    Musculoskeletal:      Cervical back: No muscular tenderness.      Right lower leg: No edema.      Left lower leg: No edema.   Skin:     General: Skin is warm and dry.      Findings: No bruising.   Neurological:      General: No focal deficit present.      Mental Status: He is alert and oriented to person, place, and time.      Cranial Nerves: No cranial nerve deficit.      Motor: No weakness.   Psychiatric:         Mood and Affect: Mood is not anxious.         Speech: Speech normal.         Thought Content: Thought content is not paranoid or delusional. Thought content does not include homicidal or suicidal ideation. Thought content does not include homicidal or suicidal plan.         Judgment: Judgment is not impulsive.       Fluids    Intake/Output Summary (Last 24 hours) at 6/13/2021 1455  Last data filed at 6/13/2021 1440  Gross per 24 hour   Intake 2683.33 ml   Output 1305 ml   Net 1378.33 ml       Laboratory  Recent Labs     06/11/21  0050 06/12/21  0030   WBC 11.9* 14.6*   RBC 4.10* 4.21*   HEMOGLOBIN 11.7*  12.0*   HEMATOCRIT 37.0* 38.4*   MCV 90.2 91.2   MCH 28.5 28.5   MCHC 31.6* 31.3*   RDW 52.4* 52.4*   PLATELETCT 353 363   MPV 10.1 10.5     Recent Labs     06/12/21  0030 06/13/21  0107   SODIUM 139 139   POTASSIUM 4.0 3.6   CHLORIDE 108 108   CO2 22 22   GLUCOSE 114* 94   BUN 16 15   CREATININE 1.66* 1.65*   CALCIUM 8.7 8.5                   Imaging  US-RENAL   Final Result      1.  Low-level echogenic debris in the urinary bladder is noted which could indicate UTI or blood within the bladder.      2.  No hydronephrosis.      IR-MIDLINE CATHETER INSERTION WO GUIDANCE > AGE 3   Final Result                  Ultrasound-guided midline placement performed by qualified nursing staff    as above.          EC-ECHOCARDIOGRAM COMPLETE W/ CONT   Final Result      DX-CHEST-PORTABLE (1 VIEW)   Final Result      1.  Minor asymmetric interstitial opacity in the right lower lobe which could represent minor interstitial edema, pneumonia, or fibrotic change.      2.  No left lung consolidation.      3.  No evidence of congestive failure.      [id number]-3 VIEW   Final Result         No specific finding to suggest small bowel obstruction.      [id number] VIEW   Final Result         No significant interval change.      DX-CHEST-LIMITED (1 VIEW)   Final Result         Diffuse interstitial prominence could relate to mild pulmonary edema or atypical infection      [id number] VIEW   Final Result      Increased colonic gas without definite bowel obstruction.      US-RENAL   Final Result      No evidence of hydronephrosis.      Lobulated kidneys bilaterally.      CT-ABDOMEN-PELVIS WITH   Final Result      1.  There is no evidence of small bowel obstruction.   2.  There is a left lower quadrant ostomy.   3.  There is fluid distention of the colon distal to the surgical material in the left mid descending colon extending all the way to the rectum. There is no pneumatosis or free air.   4.  There is cholelithiasis without biliary  dilatation.   5.  There has been interval removal of the drainage catheter anterior to the spleen with minimal hypodense area in the anterior spleen again noted. There is no new fluid collection.      IR-US GUIDED PIV   Final Result    Ultrasound-guided PERIPHERAL IV INSERTION performed by    qualified nursing staff as above.      EC-ECHOCARDIOGRAM COMPLETE W/O CONT   Final Result      DX-LUMBAR SPINE-2 OR 3 VIEWS   Final Result      Moderate compression deformity of T11 is new compared to 2018.      Mild wedge deformity of T12 is unchanged.      Degenerative changes including facet arthropathy.      Mild retrolisthesis of L5 on S1 and L3 on L4.              Assessment/Plan  * Debility- (present on admission)  Assessment & Plan  PT and OT; SNF  vit d level low 5/29- start replacement   Patient has generalized weakness  Poor prognosis    Leukocytosis- (present on admission)  Assessment & Plan  Around 12-13  No fever or chills  Continue his medications  Consider repeat CT scan for abdomen if needed    Obstructive uropathy- (present on admission)  Assessment & Plan  Bladder scan and straight cath prn    Permanent atrial fibrillation (HCC)- (present on admission)  Assessment & Plan  6/7- a-fib persistent. Cardiology consulted again to start amiodarone   Stop digoxin high risk toxicity with SRUTHI   Stop metoprolol since hypotensive   Tele monitoring  Continue eliquis     Chronic venous insufficiency of lower extremity- (present on admission)  Assessment & Plan  compression stockings.    Chronic heart failure with preserved ejection fraction (HCC)- (present on admission)  Assessment & Plan  Toprol on hold due to low BP and orthostatic hypo  C/w Atorvastatin  Monitor fluid status and Cr    Type 2 diabetes mellitus with hyperglycemia, with long-term current use of insulin (HCC)- (present on admission)  Assessment & Plan  Follow bmp  Currently not requiring Insulin    Major depressive disorder, recurrent, severe with psychotic  features ([facility])- (present on admission)  Assessment & Plan  Geodon, Paxil, Klonopin  Psychiatry following     Colostomy present ([facility])- (present on admission)  Assessment & Plan  Exploratory laparotomy with colostomy takedown and closure 5/27/2021  Segmental colectomy, colostomy creation, mobilization of splenic flexure, wound VAC placement, irrigation and debridement of flank wound on 11/10/2020.  Improving on his leukocytosis  No fever or chills and wound VAC in place   Continue oral antibiotics  working for SNF        Hypothyroidism- (present on admission)  Assessment & Plan  Increased Levothyroxine to 125 mcg  Check TSH on 6/29/2021    SRUTHI (acute kidney injury) ([facility])- (present on admission)  Assessment & Plan  6/1- resolved   FeNa showed prerenal  IV fluid hold Lasix    Adrenal insufficiency ([facility])- (present on admission)  Assessment & Plan  Likely  Redcued corticef 5 mg BID 6/13  And then corticef 5 mg daily on 7/4  He need endo evaluation as outpatient    Suicidal ideation- (present on admission)  Assessment & Plan  6/4- legal hold discontinued.   Possible SNF placement     Orthostatic hypotension- (present on admission)  Assessment & Plan  Stop florinef. Consider midodrine once heart rate improving   Florinef increase risk of water retention, poor wound healing and he is already on hydrocortisone   Stop metoprolol or other BP meds      Mixed hyperlipidemia- (present on admission)  Assessment & Plan  Atorvastatin.     Lower limb amputation, great toe ([facility])- (present on admission)  Assessment & Plan  Monitor  No signs of infection     Hypomagnesemia- (present on admission)  Assessment & Plan  Monitor and replace as needed     Class 2 severe obesity with serious comorbidity and body mass index (BMI) of 35.0 to 35.9 in adult ([facility])- (present on admission)  Assessment & Plan  Body mass index is 35.19 kg/m².    Hypokalemia- (present on admission)  Assessment & Plan  Monitor and replete as needed       VTE prophylaxis:  eliquis       Interventions to be considered in all patients in order to minimize the risk of delirium.   -do not disturb patient (vitals or lab draws) between the hours of 10 PM and 6 AM.  -ideally the patient should not sleep during the day and we should avoid day time naps.   -up in chair for meals  -ambulate at least three times daily, as able  -watch for constipation  -timed voiding - ask patient is she would like to go to the bathroom q 2-3 hours, except during the do not disturb hours.   -remove all necessary lines (central lines, peripheral IVs, feeding tubes, frye catheters)  -unless patient has shown harm to self or others I would recommend against use of restraints - either chemical or physical (antipsychotics)   -minimize polypharmacy, do not dose medication during sleep hours

## 2021-06-13 NOTE — CARE PLAN
The patient is Stable - Low risk of patient condition declining or worsening    Shift Goals  Clinical Goals: mobilization  Patient Goals: pain control  Family Goals: n/a    Progress made toward(s) clinical / shift goals:  Pt reported 8/10 pain, medicated per MAR.     Patient is not progressing towards the following goals: NA    Problem: Knowledge Deficit - Standard  Goal: Patient and family/care givers will demonstrate understanding of plan of care, disease process/condition, diagnostic tests and medications  Outcome: Progressing     Problem: Depression  Goal: Patient and family/caregiver will verbalize accurate information about at least two of the possible causes of depression, three-four of the signs and symptoms of depression  Outcome: Progressing     Problem: Provide Safe Environment  Goal: Suicide environmental safety, protocols, policies, and practices will be implemented  Outcome: Progressing     Problem: Psychosocial  Goal: Patient's ability to identify and develop effective coping behaviors will improve  Outcome: Progressing  Goal: Patient's ability to identify and utilize available support systems will improve  Outcome: Progressing     Problem: Fluid Volume  Goal: Fluid volume balance will be maintained  Outcome: Progressing     Problem: Fall Risk  Goal: Patient will remain free from falls  Outcome: Progressing     Problem: Pain - Standard  Goal: Alleviation of pain or a reduction in pain to the patient’s comfort goal  Outcome: Progressing

## 2021-06-14 ENCOUNTER — APPOINTMENT (OUTPATIENT)
Dept: CARDIOLOGY | Facility: MEDICAL CENTER | Age: 56
End: 2021-06-14
Payer: MEDICARE

## 2021-06-14 PROCEDURE — A9270 NON-COVERED ITEM OR SERVICE: HCPCS | Performed by: FAMILY MEDICINE

## 2021-06-14 PROCEDURE — 97606 NEG PRS WND THER DME>50 SQCM: CPT

## 2021-06-14 PROCEDURE — A9270 NON-COVERED ITEM OR SERVICE: HCPCS | Performed by: STUDENT IN AN ORGANIZED HEALTH CARE EDUCATION/TRAINING PROGRAM

## 2021-06-14 PROCEDURE — 700102 HCHG RX REV CODE 250 W/ 637 OVERRIDE(OP): Performed by: INTERNAL MEDICINE

## 2021-06-14 PROCEDURE — A9270 NON-COVERED ITEM OR SERVICE: HCPCS | Performed by: SURGERY

## 2021-06-14 PROCEDURE — 700102 HCHG RX REV CODE 250 W/ 637 OVERRIDE(OP): Performed by: FAMILY MEDICINE

## 2021-06-14 PROCEDURE — 99232 SBSQ HOSP IP/OBS MODERATE 35: CPT | Performed by: STUDENT IN AN ORGANIZED HEALTH CARE EDUCATION/TRAINING PROGRAM

## 2021-06-14 PROCEDURE — A9270 NON-COVERED ITEM OR SERVICE: HCPCS | Performed by: HOSPITALIST

## 2021-06-14 PROCEDURE — 770001 HCHG ROOM/CARE - MED/SURG/GYN PRIV*

## 2021-06-14 PROCEDURE — A9270 NON-COVERED ITEM OR SERVICE: HCPCS | Performed by: INTERNAL MEDICINE

## 2021-06-14 PROCEDURE — 700102 HCHG RX REV CODE 250 W/ 637 OVERRIDE(OP): Performed by: SURGERY

## 2021-06-14 PROCEDURE — 302098 PASTE RING (FLAT): Performed by: STUDENT IN AN ORGANIZED HEALTH CARE EDUCATION/TRAINING PROGRAM

## 2021-06-14 PROCEDURE — 700102 HCHG RX REV CODE 250 W/ 637 OVERRIDE(OP): Performed by: STUDENT IN AN ORGANIZED HEALTH CARE EDUCATION/TRAINING PROGRAM

## 2021-06-14 PROCEDURE — 700111 HCHG RX REV CODE 636 W/ 250 OVERRIDE (IP): Performed by: INTERNAL MEDICINE

## 2021-06-14 PROCEDURE — 700102 HCHG RX REV CODE 250 W/ 637 OVERRIDE(OP): Performed by: HOSPITALIST

## 2021-06-14 PROCEDURE — 700105 HCHG RX REV CODE 258: Performed by: STUDENT IN AN ORGANIZED HEALTH CARE EDUCATION/TRAINING PROGRAM

## 2021-06-14 RX ORDER — TAMSULOSIN HYDROCHLORIDE 0.4 MG/1
0.4 CAPSULE ORAL
Status: DISCONTINUED | OUTPATIENT
Start: 2021-06-14 | End: 2021-07-09 | Stop reason: HOSPADM

## 2021-06-14 RX ADMIN — SODIUM CHLORIDE: 9 INJECTION, SOLUTION INTRAVENOUS at 14:29

## 2021-06-14 RX ADMIN — OXYCODONE 10 MG: 5 TABLET ORAL at 18:38

## 2021-06-14 RX ADMIN — METRONIDAZOLE 500 MG: 500 TABLET ORAL at 21:48

## 2021-06-14 RX ADMIN — NYSTATIN: 100000 POWDER TOPICAL at 18:39

## 2021-06-14 RX ADMIN — CIPROFLOXACIN HYDROCHLORIDE 500 MG: 500 TABLET, FILM COATED ORAL at 18:38

## 2021-06-14 RX ADMIN — HYDROCORTISONE 5 MG: 5 TABLET ORAL at 04:30

## 2021-06-14 RX ADMIN — HYDROMORPHONE HYDROCHLORIDE 0.5 MG: 1 INJECTION, SOLUTION INTRAMUSCULAR; INTRAVENOUS; SUBCUTANEOUS at 09:08

## 2021-06-14 RX ADMIN — CLONAZEPAM 1 MG: 1 TABLET ORAL at 18:38

## 2021-06-14 RX ADMIN — Medication 1000 UNITS: at 04:31

## 2021-06-14 RX ADMIN — TAMSULOSIN HYDROCHLORIDE 0.4 MG: 0.4 CAPSULE ORAL at 15:33

## 2021-06-14 RX ADMIN — APIXABAN 5 MG: 5 TABLET, FILM COATED ORAL at 18:38

## 2021-06-14 RX ADMIN — OXYCODONE 10 MG: 5 TABLET ORAL at 04:30

## 2021-06-14 RX ADMIN — SODIUM CHLORIDE: 9 INJECTION, SOLUTION INTRAVENOUS at 23:52

## 2021-06-14 RX ADMIN — ATORVASTATIN CALCIUM 40 MG: 40 TABLET, FILM COATED ORAL at 20:13

## 2021-06-14 RX ADMIN — METRONIDAZOLE 500 MG: 500 TABLET ORAL at 14:06

## 2021-06-14 RX ADMIN — NYSTATIN: 100000 POWDER TOPICAL at 04:31

## 2021-06-14 RX ADMIN — HYDROCORTISONE 5 MG: 5 TABLET ORAL at 14:05

## 2021-06-14 RX ADMIN — AMIODARONE HYDROCHLORIDE 200 MG: 200 TABLET ORAL at 18:38

## 2021-06-14 RX ADMIN — AMIODARONE HYDROCHLORIDE 200 MG: 200 TABLET ORAL at 04:31

## 2021-06-14 RX ADMIN — CIPROFLOXACIN HYDROCHLORIDE 500 MG: 500 TABLET, FILM COATED ORAL at 04:31

## 2021-06-14 RX ADMIN — ZIPRASIDONE HYDROCHLORIDE 60 MG: 60 CAPSULE ORAL at 20:14

## 2021-06-14 RX ADMIN — APIXABAN 5 MG: 5 TABLET, FILM COATED ORAL at 04:31

## 2021-06-14 RX ADMIN — OXYCODONE 10 MG: 5 TABLET ORAL at 21:48

## 2021-06-14 RX ADMIN — METRONIDAZOLE 500 MG: 500 TABLET ORAL at 04:30

## 2021-06-14 RX ADMIN — DOCUSATE SODIUM 100 MG: 100 CAPSULE, LIQUID FILLED ORAL at 18:39

## 2021-06-14 RX ADMIN — DOXYCYCLINE 100 MG: 100 TABLET, FILM COATED ORAL at 04:31

## 2021-06-14 RX ADMIN — PAROXETINE HYDROCHLORIDE 20 MG: 20 TABLET, FILM COATED ORAL at 09:01

## 2021-06-14 RX ADMIN — ZIPRASIDONE HYDROCHLORIDE 60 MG: 60 CAPSULE ORAL at 09:01

## 2021-06-14 RX ADMIN — LEVOTHYROXINE SODIUM 125 MCG: 0.12 TABLET ORAL at 04:31

## 2021-06-14 RX ADMIN — CLONAZEPAM 0.5 MG: 0.5 TABLET ORAL at 09:01

## 2021-06-14 RX ADMIN — DOXYCYCLINE 100 MG: 100 TABLET, FILM COATED ORAL at 18:38

## 2021-06-14 ASSESSMENT — ENCOUNTER SYMPTOMS
DIAPHORESIS: 0
SENSORY CHANGE: 0
PALPITATIONS: 0
NECK PAIN: 0
FLANK PAIN: 0
HALLUCINATIONS: 0
HEARTBURN: 0
NERVOUS/ANXIOUS: 0
BLURRED VISION: 0
CONSTIPATION: 0
NAUSEA: 0
DEPRESSION: 0
WHEEZING: 0
FOCAL WEAKNESS: 0
WEAKNESS: 1
SORE THROAT: 0
HEADACHES: 0
SPEECH CHANGE: 0
DIARRHEA: 0
SHORTNESS OF BREATH: 0
MYALGIAS: 0
ABDOMINAL PAIN: 0
DIZZINESS: 0
CHILLS: 0
FEVER: 0
VOMITING: 0
COUGH: 0
BACK PAIN: 0

## 2021-06-14 ASSESSMENT — PAIN DESCRIPTION - PAIN TYPE
TYPE: CHRONIC PAIN

## 2021-06-14 ASSESSMENT — FIBROSIS 4 INDEX: FIB4 SCORE: 1.25

## 2021-06-14 NOTE — PROGRESS NOTES
Cardiology Follow-up Consultation Note    Date of note:    6/14/2021    Primary Care Provider: ELIZABETH Terrazas  Referring Provider: Trent Decker M.D.     Patient Name: Sebastián Castro     YOB: 1965  MRN:              [id number]    Chief Complaint: ***    History of Present Illness: Mr. Sebastián Castro is a 56 y.o. male whose current medical problems include *** who is here for cardiac consultation for ***.    The patient was evaluated by cardiology for AFRVR ad volume overload when he was admitted.  The volume overload was thought secondary to anarsarca.  The patient was started on amiodarone ***      Cardiovascular Risk Factors:  1. Smoking status: ***  2. Type II Diabetes Mellitus: ***   Lab Results   Component Value Date/Time    HBA1C 5.2 02/14/2021 01:30 AM    HBA1C 7.8 (H) 11/10/2020 03:31 AM     3. Hypertension: ***  4. Dyslipidemia: ***   Cholesterol,Tot   Date Value Ref Range Status   02/14/2021 146 100 - 199 mg/dL Final     LDL   Date Value Ref Range Status   02/14/2021 82 <100 mg/dL Final     HDL   Date Value Ref Range Status   02/14/2021 32 (A) >=40 mg/dL Final     Triglycerides   Date Value Ref Range Status   02/14/2021 160 (H) 0 - 149 mg/dL Final     5. Family history of early Coronary Artery Disease in a first degree relative (Male less than 55 years of age; Female less than 65 years of age): ***  6.  Obesity and/or Metabolic Syndrome: ***  7. Sedentary lifestyle: ***    ROS      All other systems reviewed and are negative.       Past Medical History:   Diagnosis Date   • A-fib ([facility])    • Abscess 11/8/2020   • Bacteremia 11/8/2020   • Chest pain    • Congestive heart failure (HCC)    • Diabetes ([facility])    • Diabetic neuropathy ([facility])    • Hypercholesterolemia    • Hypertension    • Longstanding persistent atrial fibrillation ([facility]) 11/8/2020   • LV dysfunction 11/8/2020   • Morbid obesity ([facility])    • NICM (nonischemic cardiomyopathy) ([facility]) 11/8/2020   • Noncompliance    • Normal  cardiac stress test    • Orthostatic hypotension    • Sepsis (HCC) 11/8/2020         Past Surgical History:   Procedure Laterality Date   • PB EXPLORATORY OF ABDOMEN  5/27/2021    Procedure: LAPAROTOMY, EXPLORATORY;  Surgeon: Kin Desouza D.O.;  Location: [facility];  Service: General   • COLOSTOMY TAKEDOWN  5/27/2021    Procedure: COLOSTOMY TAKEDOWN AND CLOSURE;  Surgeon: Kin Desouza D.O.;  Location: [facility];  Service: General   • COLECTOMY N/A 11/10/2020    Procedure: COLECTOMY-SEGMENTAL, COLOSTOMY, MOBILIZATION OF SPLENIC FLEXURE, WOUND VAC PLACEMENT, IRRIGATION AND DEBRIDMENT FLANK WOUND;  Surgeon: Kin Desouza D.O.;  Location: [facility];  Service: General   • ZZZ CARDIAC CATH  07/21/2018    EF 45%, normal coronaries.   • IRRIGATION & DEBRIDEMENT ORTHO Right 2/19/2018    Procedure: IRRIGATION & DEBRIDEMENT ORTHO-FOOT;  Surgeon: Kirby Lopez M.D.;  Location: [facility];  Service: Orthopedics   • TOE AMPUTATION Right 1/17/2018    Procedure: TOE AMPUTATION-1ST RAY;  Surgeon: Doug Delong M.D.;  Location: [facility];  Service: Orthopedics   • TOE AMPUTATION Right 11/10/2017    Procedure: TOE AMPUTATION;  Surgeon: Osorio Leo M.D.;  Location: [facility];  Service: Orthopedics         No current facility-administered medications for this visit.     Current Outpatient Medications   Medication Sig Dispense Refill   • digoxin (LANOXIN) 125 MCG Tab Take 1 tablet by mouth every day at 6 PM. 30 tablet    • apixaban (ELIQUIS) 5mg Tab Take 1 tablet by mouth 2 times a day. Indications: Thromboembolism secondary to Atrial Fibrillation 60 tablet    • hydrocortisone (CORTEF) 10 MG Tab Take 1 tablet by mouth every morning. 30 tablet    • hydrocortisone (CORTEF) 5 MG Tab Take 1 tablet by mouth every day. 120 tablet    • PARoxetine (PAXIL) 20 MG Tab Take 1 tablet by mouth every day. 30 tablet    • simethicone (MYLICON) 80 MG Chew Tab Chew 1  tablet 3 times a day as needed (Gas pains). 30 tablet 3   • ziprasidone (GEODON) 60 MG Cap Take 1 capsule by mouth 2 times a day. 60 capsule    • atorvastatin (LIPITOR) 40 MG Tab Take 1 Tablet by mouth every evening for 30 days. 30 tablet 0   • fludrocortisone (FLORINEF) 0.1 MG Tab Take 1 Tablet by mouth every morning for 30 days. 30 tablet 0   • levothyroxine (SYNTHROID) 112 MCG Tab Take 1 Tablet by mouth Every morning on an empty stomach for 30 days. 30 tablet 0   • tamsulosin (FLOMAX) 0.4 MG capsule Take 1 Capsule by mouth 1/2 hour after breakfast for 30 days. 30 capsule 0     Facility-Administered Medications Ordered in Other Visits   Medication Dose Route Frequency Provider Last Rate Last Admin   • [START ON 6/14/2021] hydrocortisone (CORTEF) tablet 5 mg  5 mg Oral BID Sebastián Juarez M.D.       • nystatin (MYCOSTATIN) powder   Topical BID Sebastián Juarez M.D.   Given at 06/13/21 1736   • NS infusion   Intravenous Continuous Sebastián Juarez M.D. 100 mL/hr at 06/13/21 1836 New Bag at 06/13/21 1836   • LORazepam (ATIVAN) tablet 0.5 mg  0.5 mg Oral QDAY PRN Brittany Montana M.D.       • metoclopramide (REGLAN) tablet 10 mg  10 mg Oral Q6HRS PRN Brittany Montana M.D.       • HYDROmorphone (Dilaudid) injection 0.5 mg  0.5 mg Intravenous BID PRN Brittany Montana M.D.   0.5 mg at 06/11/21 0916   • ciprofloxacin (CIPRO) tablet 500 mg  500 mg Oral Q12HRS Conor Rodriguez M.D.   500 mg at 06/13/21 1735   • metroNIDAZOLE (FLAGYL) tablet 500 mg  500 mg Oral Q8HRS Conor Rodriguez M.D.   500 mg at 06/13/21 1439   • amiodarone (Cordarone) tablet 200 mg  200 mg Oral TWICE DAILY Conor Rodriguez M.D.   200 mg at 06/13/21 1735   • doxycycline monohydrate (ADOXA) tablet 100 mg  100 mg Oral Q12HRS Conor Rodriguez M.D.   100 mg at 06/13/21 1735   • vitamin D (cholecalciferol) tablet 1,000 Units  1,000 Units Oral DAILY Conor Rodriguez M.D.   1,000 Units at 06/13/21 7095   • apixaban (ELIQUIS) tablet 5 mg  5 mg Oral BID Conor Rodriguez M.D.   5 mg at 06/13/21 173     • levothyroxine (SYNTHROID) tablet 125 mcg  125 mcg Oral AM VINH Lazo M.D.   125 mcg at 06/13/21 0445   • docusate sodium (COLACE) capsule 100 mg  100 mg Oral BID Kin Desouza D.O.   100 mg at 06/05/21 0625   • oxyCODONE immediate-release (ROXICODONE) tablet 5-10 mg  5-10 mg Oral Q3HRS PRN Kin Desouza D.O.   10 mg at 06/13/21 1439   • ondansetron (ZOFRAN) syringe/vial injection 4 mg  4 mg Intravenous Q4HRS PRN Cassandra Fofana M.D.   4 mg at 06/05/21 0244   • labetalol (NORMODYNE/TRANDATE) injection 10 mg  10 mg Intravenous Q6HRS PRN Cassandra Fofana M.D.       • senna-docusate (PERICOLACE or SENOKOT S) 8.6-50 MG per tablet 2 tablet  2 tablet Oral DAILY Brennan Jarrett M.D.   2 tablet at 06/05/21 0625   • glucose 4 g chewable tablet 16 g  16 g Oral Q15 MIN PRN Jose De Jesus Villela A.P.R.N.        And   • dextrose 50% (D50W) injection 50 mL  50 mL Intravenous Q15 MIN PRN Jose De Jesus Villela A.P.R.N.       • PARoxetine (PAXIL) tablet 20 mg  20 mg Oral DAILY Trent Decker M.D.   20 mg at 06/13/21 0833   • clonazePAM (KLONOPIN) tablet 0.5 mg  0.5 mg Oral DAILY Trent Decker M.D.   0.5 mg at 06/13/21 0833   • clonazePAM (KLONOPIN) tablet 1 mg  1 mg Oral Q EVENING Trent Decker M.D.   1 mg at 06/13/21 1735   • ziprasidone (GEODON) capsule 60 mg  60 mg Oral BID Trent Decker M.D.   60 mg at 06/13/21 0833   • acetaminophen (Tylenol) tablet 650 mg  650 mg Oral Q4HRS PRN Trent Decker M.D.   650 mg at 06/06/21 1730   • ondansetron (ZOFRAN ODT) dispertab 4 mg  4 mg Oral Q4HRS PRN Carmel Phillips M.D.   4 mg at 06/04/21 1829   • atorvastatin (LIPITOR) tablet 40 mg  40 mg Oral Q EVENING Carmel Phillips M.D.   40 mg at 06/13/21 1735         Allergies   Allergen Reactions   • Zosyn [Piperacillin Sod-Tazobactam So] Rash and Itching     Redness/flushed throughout body.    • Daptomycin Rash     Body rash  RXN=1/2018     • Pcn [Penicillins] Hives and Rash      Rash & hives  RXN=since a kid  Tolerates cephalosporins   • Unasyn  [Ampicillin-Sulbactam Sodium] Hives, Itching and Swelling   • Vancomycin Rash     Red man syndrome. Tolerates IV vancomycin at reduced infusion rate.         Family History   Problem Relation Age of Onset   • No Known Problems Mother    • No Known Problems Father          Social History     Socioeconomic History   • Marital status:      Spouse name: Not on file   • Number of children: Not on file   • Years of education: Not on file   • Highest education level: Not on file   Occupational History   • Not on file   Tobacco Use   • Smoking status: Never Smoker   • Smokeless tobacco: Never Used   Vaping Use   • Vaping Use: Never used   Substance and Sexual Activity   • Alcohol use: No   • Drug use: No   • Sexual activity: Not on file   Other Topics Concern   • Not on file   Social History Narrative   • Not on file     Social Determinants of Health     Financial Resource Strain:    • Difficulty of Paying Living Expenses:    Food Insecurity:    • Worried About Running Out of Food in the Last Year:    • Ran Out of Food in the Last Year:    Transportation Needs:    • Lack of Transportation (Medical):    • Lack of Transportation (Non-Medical):    Physical Activity:    • Days of Exercise per Week:    • Minutes of Exercise per Session:    Stress:    • Feeling of Stress :    Social Connections:    • Frequency of Communication with Friends and Family:    • Frequency of Social Gatherings with Friends and Family:    • Attends Confucianist Services:    • Active Member of Clubs or Organizations:    • Attends Club or Organization Meetings:    • Marital Status:    Intimate Partner Violence:    • Fear of Current or Ex-Partner:    • Emotionally Abused:    • Physically Abused:    • Sexually Abused:          Physical Exam:  Ambulatory Vitals  There were no vitals taken for this visit.   Oxygen Therapy:     BP Readings from Last 4 Encounters:   06/13/21 114/73   02/05/21 132/61   12/17/20 105/73   09/10/20 146/96       Weight/BMI: There  is no height or weight on file to calculate BMI.  Wt Readings from Last 4 Encounters:   06/01/21 115 kg (253 lb 8.5 oz)   02/01/21 (!) 131 kg (289 lb)   12/17/20 (!) 128 kg (281 lb 12 oz)   09/10/20 (!) 186 kg (410 lb 7.9 oz)         General: Well appearing and in no apparent distress  Eyes: ***nl conjunctiva, no icteric sclera  ENT: wearing a mask***, normal external appearance of ears  Neck: ***no visible JVP, *** no carotid bruits  Lungs: normal respiratory effort, CTAB  Heart: ***RRR, ***no murmurs, no rubs or gallops, *** no edema bilateral lower extremities. No LV/RV heave on cardiac palpatation. ***+ bilateral radial pulses.  ***+ bilateral dp pulses.   Abdomen: soft, non tender, non distended, no masses, normal bowel sounds.  No HSM.  Extremities/MSK: no clubbing, no cyanosis  Neurological: No focal sensory deficits  Psychiatric: Appropriate affect, A/O x 3, intact judgement and insight  Skin: Warm extremities      Lab Data Review:  Lab Results   Component Value Date/Time    CHOLSTRLTOT 146 02/14/2021 01:30 AM    LDL 82 02/14/2021 01:30 AM    HDL 32 (A) 02/14/2021 01:30 AM    TRIGLYCERIDE 160 (H) 02/14/2021 01:30 AM       Lab Results   Component Value Date/Time    SODIUM 139 06/13/2021 01:07 AM    POTASSIUM 3.6 06/13/2021 01:07 AM    CHLORIDE 108 06/13/2021 01:07 AM    CO2 22 06/13/2021 01:07 AM    GLUCOSE 94 06/13/2021 01:07 AM    BUN 15 06/13/2021 01:07 AM    CREATININE 1.65 (H) 06/13/2021 01:07 AM     Lab Results   Component Value Date/Time    ALKPHOSPHAT 103 (H) 06/08/2021 04:08 AM    ASTSGOT 26 06/08/2021 04:08 AM    ALTSGPT 11 06/08/2021 04:08 AM    TBILIRUBIN 0.4 06/08/2021 04:08 AM      Lab Results   Component Value Date/Time    WBC 14.6 (H) 06/12/2021 12:30 AM     Lab Results   Component Value Date/Time    HBA1C 5.2 02/14/2021 01:30 AM    HBA1C 7.8 (H) 11/10/2020 03:31 AM         Cardiac Imaging and Procedures Review:    EKG dated ***: My personal interpretation is ***    Echo dated ***:      CONCLUSIONS  Compared to the images of the prior study done 2/10/2021, no   significant changes are noted.  Left ventricular ejection fraction is visually estimated to be 55%.  Estimated right ventricular systolic pressure  is 25 mmHg.    Exercise stress testing (***):   ***    Nuclear Perfusion Imaging (***):   ***    Holter Monitor (***):   ***    [facility] (***):   ***    Radiology test Review:  CXR: ***     Carotid Artery Ultrasound (***): ***      Assessment & Plan     No diagnosis found.      Shared Medical Decision Making:  Afib    All of *** excellent questions were answered to the best of my knowledge and to *** satisfaction.  It was a pleasure seeing Mr. Sebastián Castro in my clinic today. No follow-ups on file. Patient is aware to call the cardiology clinic with any questions or concerns.      Jo-Ann Haney MD  [facility], [address]  [address] [address]  Phone: [phone]  Fax: [phone]

## 2021-06-14 NOTE — ASSESSMENT & PLAN NOTE
-HR controlled, continue metoprolol 12.5 mg BID  10/19 - adm - Will start patient on metoprolol and titrate as tolerated to the lowest dose for rate control. We will ask for cardiology input this patient has not followed up with cardiology since his last visit. Given his recurrent syncope he is at high risk for bleeding so he has not been maintained on anticoagulation given this fact I doubt he will be a candidate for ablation or cardioversion  
-gentle diuresis has to be stopped as patient becomes too symptomatic  -start abdominal binder and B/L compression stockings thigh high-seeing some noticeable improvement now, evaluate for one more day in the hospital  -monitor  -continue outpatient medications  -unclear etiology  -thoroughly worked up in the past  -check BID orthostats    
-monitor sugars      
-polypharmacy in this particular clinical situation is necessary as patient has severe refractory orthostatic hypotension  -continue current medications and try to wean off what is possible  
-volume status is difficult to determine  
Multifactorial, falls, orthostatic hypotension, obesity   Physical and occupational therapy evaluation   
ordered compression stockings  
surgery team

## 2021-06-14 NOTE — CARE PLAN
The patient is Stable - Low risk of patient condition declining or worsening    Shift Goals  Clinical Goals: mobilization  Patient Goals: pain control  Family Goals: n/a    Progress made toward(s) clinical / shift goals:  Pt has no complaints of pain at this time. Currently resting.     Patient is not progressing towards the following goals: NA    Problem: Knowledge Deficit - Standard  Goal: Patient and family/care givers will demonstrate understanding of plan of care, disease process/condition, diagnostic tests and medications  Outcome: Progressing     Problem: Depression  Goal: Patient and family/caregiver will verbalize accurate information about at least two of the possible causes of depression, three-four of the signs and symptoms of depression  Outcome: Progressing     Problem: Provide Safe Environment  Goal: Suicide environmental safety, protocols, policies, and practices will be implemented  Outcome: Progressing     Problem: Psychosocial  Goal: Patient's ability to identify and develop effective coping behaviors will improve  Outcome: Progressing  Goal: Patient's ability to identify and utilize available support systems will improve  Outcome: Progressing     Problem: Hemodynamics  Goal: Patient's hemodynamics, fluid balance and neurologic status will be stable or improve  Outcome: Progressing     Problem: Fluid Volume  Goal: Fluid volume balance will be maintained  Outcome: Progressing     Problem: Respiratory  Goal: Patient will achieve/maintain optimum respiratory ventilation and gas exchange  Outcome: Progressing     Problem: Fall Risk  Goal: Patient will remain free from falls  Outcome: Progressing     Problem: Pain - Standard  Goal: Alleviation of pain or a reduction in pain to the patient’s comfort goal  Outcome: Progressing

## 2021-06-14 NOTE — PROGRESS NOTES
Assume care of pt at 0700. Report received from NOC RN. Pt is reported as A/O x4, is currently sleeping. Bed in lowest and locked position, call light in reach, hourly rounding in place. Labs reviewed. Communication board updated.

## 2021-06-14 NOTE — PROGRESS NOTES
Assessment complete.  AA&Ox4.   SpO2 95% on RA. Denies SOB.  Reporting 8/10 pain. Medicated per MAR.   Midline incision w/ wound vac.   Tolerating low fiber GI soft diet. Denies N/V.  + void.   LBM 6/13/21.   Pt up with max assist to edge of bed.   All needs met at this time. Call light within reach. Pt calls appropriately.

## 2021-06-14 NOTE — PROGRESS NOTES
[facility] Medicine Daily Progress Note    Date of Service  6/14/2021    Chief Complaint  56 y.o. male admitted 2/9/2021 with Syncope.    Hospital Course  56-year-old male with history of atrial fibrillation, diabetes, adrenal insufficiency, depression and obesity presented 2/9 with syncope and bradycardia with hypotension. Patient was on diltiazem, digoxin and metoprolol which all were held; once, bradycardia had improved and resumed metoprolol and digoxin with later switched to amiodarone by cardiologist, Eliquis was started for anticoagulation.    He also had a possible history of adrenal insufficiency, he was already on Florinef and midodrine.  Midodrine was discontinued and he was started on cortisol.     Further work-up showed that he had gram-negative bacteremia as well as urinary tract infection and acute kidney injury.  He was initially placed on IV Zosyn, later changed to oral Bactrim to finish the course.  He was carefully hydrated with IV fluids.      On admission, he also expressed suicidal ideation.  Psychiatry was consulted the case, he was placed on a legal hold.  He was noted to have severe depression.  He has been placed on medication the form of Geodon, Paxil, Klonopin.  He has a colostomy in place, which the patient believes contributes to his severe depression because of ostomy care.      He had history of bowel perforation and splenic fluid collection requiring Segmental colectomy, colostomy creation, mobilization of splenic flexure, wound VAC placement, irrigation and debridement of flank wound on 11/10/2020.  Surgery was consulted on the case, and he underwent exploratory laparotomy and colostomy takedown and closure on 5/27/2021. Pt was followed by wound care post op with wound vac interval changes.    Patient lives by himself and is not able to take care of himself at this time, PT and OT evaluated the patient and recommended SNF    Interval Problem Update  6/14  Vitals reviewed; afebrile.   The rest of the vitals within normal parameters.  Pain scale reported - 5-7 in right abdomen  I/O - wound vac drain 475cc, decent urine output    At bedside seen after Gen Surg and wound care team, no acute complain from patient. He expressed understanding of probably needing more staple or OR.     Labs reviewed   Cr 1.59 > 1.66 > 1.65  WBC 14.6      Consultants/Specialty  Cardiology  Psychiatry  Surgery    Code Status  Full Code    Disposition  SNF placement pending   Patient needs help for daily activities  Patient is medically cleared    Discussed with patient, patient's nurse and with multidisciplinary team during rounds including , pharmacist and charge nurse.      I have performed the physical examination, and reviewed updated ROS and plan today 6/14/2021.  In review of yesterday's note, there are no new changes except as documented above or bolded below.      Review of Systems  Review of Systems   Constitutional: Positive for malaise/fatigue. Negative for chills, diaphoresis and fever.   HENT: Negative for congestion, hearing loss and sore throat.    Eyes: Negative for blurred vision.   Respiratory: Negative for cough, shortness of breath and wheezing.    Cardiovascular: Negative for chest pain, palpitations and leg swelling.   Gastrointestinal: Negative for abdominal pain, constipation, diarrhea, heartburn, nausea and vomiting.   Genitourinary: Negative for dysuria, flank pain and hematuria.   Musculoskeletal: Negative for back pain, joint pain, myalgias and neck pain.   Skin: Negative for rash.   Neurological: Positive for weakness. Negative for dizziness, sensory change, speech change, focal weakness and headaches.   Psychiatric/Behavioral: Negative for depression, hallucinations and suicidal ideas. The patient is not nervous/anxious.       Physical Exam  Temp:  [35.8 °C (96.5 °F)-36.6 °C (97.8 °F)] 35.8 °C (96.5 °F)  Pulse:  [] 83  Resp:  [16-18] 18  BP: (112-132)/(69-74) 112/69  SpO2:   [93 %-97 %] 97 %    Physical Exam  Vitals and nursing note reviewed.   Constitutional:       Appearance: He is obese.   HENT:      Head: Normocephalic and atraumatic.      Nose: No congestion.      Mouth/Throat:      Mouth: Mucous membranes are moist.   Eyes:      Extraocular Movements: Extraocular movements intact.      Conjunctiva/sclera: Conjunctivae normal.      Pupils: Pupils are equal, round, and reactive to light.   Cardiovascular:      Rate and Rhythm: Normal rate. Rhythm irregular.   Pulmonary:      Effort: Pulmonary effort is normal. No respiratory distress.      Breath sounds: No wheezing or rales.   Abdominal:      General: There is no distension.      Tenderness: There is no abdominal tenderness. There is no guarding or rebound.      Comments: Wound vac in place    Musculoskeletal:      Cervical back: No muscular tenderness.      Right lower leg: No edema.      Left lower leg: No edema.   Skin:     General: Skin is warm and dry.      Findings: No bruising.   Neurological:      General: No focal deficit present.      Mental Status: He is alert and oriented to person, place, and time.      Cranial Nerves: No cranial nerve deficit.      Motor: No weakness.   Psychiatric:         Mood and Affect: Mood is not anxious.         Speech: Speech normal.         Thought Content: Thought content is not paranoid or delusional. Thought content does not include homicidal or suicidal ideation. Thought content does not include homicidal or suicidal plan.         Judgment: Judgment is not impulsive.       Fluids    Intake/Output Summary (Last 24 hours) at 6/14/2021 1040  Last data filed at 6/14/2021 0805  Gross per 24 hour   Intake 2550 ml   Output 1030 ml   Net 1520 ml       Laboratory  Recent Labs     06/12/21  0030   WBC 14.6*   RBC 4.21*   HEMOGLOBIN 12.0*   HEMATOCRIT 38.4*   MCV 91.2   MCH 28.5   MCHC 31.3*   RDW 52.4*   PLATELETCT 363   MPV 10.5     Recent Labs     06/12/21  0030 06/13/21  0107   SODIUM 139 139    POTASSIUM 4.0 3.6   CHLORIDE 108 108   CO2 22 22   GLUCOSE 114* 94   BUN 16 15   CREATININE 1.66* 1.65*   CALCIUM 8.7 8.5                   Imaging  US-RENAL   Final Result      1.  Low-level echogenic debris in the urinary bladder is noted which could indicate UTI or blood within the bladder.      2.  No hydronephrosis.      IR-MIDLINE CATHETER INSERTION WO GUIDANCE > AGE 3   Final Result                  Ultrasound-guided midline placement performed by qualified nursing staff    as above.          EC-ECHOCARDIOGRAM COMPLETE W/ CONT   Final Result      DX-CHEST-PORTABLE (1 VIEW)   Final Result      1.  Minor asymmetric interstitial opacity in the right lower lobe which could represent minor interstitial edema, pneumonia, or fibrotic change.      2.  No left lung consolidation.      3.  No evidence of congestive failure.      SE-TFFWDMA-8 VIEW   Final Result         No specific finding to suggest small bowel obstruction.      WO-OACBSTE-7 VIEW   Final Result         No significant interval change.      DX-CHEST-LIMITED (1 VIEW)   Final Result         Diffuse interstitial prominence could relate to mild pulmonary edema or atypical infection      HU-YSVOBRX-9 VIEW   Final Result      Increased colonic gas without definite bowel obstruction.      US-RENAL   Final Result      No evidence of hydronephrosis.      Lobulated kidneys bilaterally.      CT-ABDOMEN-PELVIS WITH   Final Result      1.  There is no evidence of small bowel obstruction.   2.  There is a left lower quadrant ostomy.   3.  There is fluid distention of the colon distal to the surgical material in the left mid descending colon extending all the way to the rectum. There is no pneumatosis or free air.   4.  There is cholelithiasis without biliary dilatation.   5.  There has been interval removal of the drainage catheter anterior to the spleen with minimal hypodense area in the anterior spleen again noted. There is no new fluid collection.      IR-US  GUIDED PIV   Final Result    Ultrasound-guided PERIPHERAL IV INSERTION performed by    qualified nursing staff as above.      EC-ECHOCARDIOGRAM COMPLETE W/O CONT   Final Result      DX-LUMBAR SPINE-2 OR 3 VIEWS   Final Result      Moderate compression deformity of T11 is new compared to 2018.      Mild wedge deformity of T12 is unchanged.      Degenerative changes including facet arthropathy.      Mild retrolisthesis of L5 on S1 and L3 on L4.              Assessment/Plan  * Debility- (present on admission)  Assessment & Plan  PT and OT; SNF  vit d level low 5/29- start replacement   Patient has generalized weakness  Poor prognosis    Leukocytosis- (present on admission)  Assessment & Plan  Around 12-13  No fever or chills  Continue his medications  Consider repeat CT scan for abdomen if needed    Obstructive uropathy- (present on admission)  Assessment & Plan  Bladder scan and straight cath prn    Permanent atrial fibrillation (HCC)- (present on admission)  Assessment & Plan  6/7- a-fib persistent. Cardiology consulted again to start amiodarone   Stop digoxin high risk toxicity with SRUTHI   Stop metoprolol since hypotensive   Tele monitoring  Continue eliquis     Chronic venous insufficiency of lower extremity- (present on admission)  Assessment & Plan  compression stockings.    Chronic heart failure with preserved ejection fraction (HCC)- (present on admission)  Assessment & Plan  Toprol on hold due to low BP and orthostatic hypo  C/w Atorvastatin  Monitor fluid status and Cr    Type 2 diabetes mellitus with hyperglycemia, with long-term current use of insulin (HCC)- (present on admission)  Assessment & Plan  Follow bmp  Currently not requiring Insulin    Major depressive disorder, recurrent, severe with psychotic features (HCC)- (present on admission)  Assessment & Plan  Geodon, Paxil, Klonopin  Psychiatry following     Colostomy present (HCC)- (present on admission)  Assessment & Plan  Exploratory laparotomy with  colostomy takedown and closure 5/27/2021  Segmental colectomy, colostomy creation, mobilization of splenic flexure, wound VAC placement, irrigation and debridement of flank wound on 11/10/2020.  Improving on his leukocytosis  No fever or chills and wound VAC in place   Continue oral antibiotics  working for SNF        Hypothyroidism- (present on admission)  Assessment & Plan  Increased Levothyroxine to 125 mcg  Check TSH on 6/29/2021    SRUTHI (acute kidney injury) ([facility])- (present on admission)  Assessment & Plan  6/1- resolved   FeNa showed prerenal  IV fluid hold Lasix    Adrenal insufficiency ([facility])- (present on admission)  Assessment & Plan  Likely  Redcued corticef 5 mg BID 6/13  And then corticef 5 mg daily on 7/4  He need endo evaluation as outpatient    Suicidal ideation- (present on admission)  Assessment & Plan  6/4- legal hold discontinued.   Possible SNF placement     Orthostatic hypotension- (present on admission)  Assessment & Plan  Stop florinef. Consider midodrine once heart rate improving   Florinef increase risk of water retention, poor wound healing and he is already on hydrocortisone   Stop metoprolol or other BP meds      Mixed hyperlipidemia- (present on admission)  Assessment & Plan  Atorvastatin.     Lower limb amputation, great toe ([facility])- (present on admission)  Assessment & Plan  Monitor  No signs of infection     Hypomagnesemia- (present on admission)  Assessment & Plan  Monitor and replace as needed     Class 2 severe obesity with serious comorbidity and body mass index (BMI) of 35.0 to 35.9 in adult ([facility])- (present on admission)  Assessment & Plan  Body mass index is 35.19 kg/m².    Hypokalemia- (present on admission)  Assessment & Plan  Monitor and replete as needed       VTE prophylaxis: eliquis       Interventions to be considered in all patients in order to minimize the risk of delirium.   -do not disturb patient (vitals or lab draws) between the hours of 10 PM and 6 AM.  -ideally the  patient should not sleep during the day and we should avoid day time naps.   -up in chair for meals  -ambulate at least three times daily, as able  -watch for constipation  -timed voiding - ask patient is she would like to go to the bathroom q 2-3 hours, except during the do not disturb hours.   -remove all necessary lines (central lines, peripheral IVs, feeding tubes, frye catheters)  -unless patient has shown harm to self or others I would recommend against use of restraints - either chemical or physical (antipsychotics)   -minimize polypharmacy, do not dose medication during sleep hours

## 2021-06-14 NOTE — PROGRESS NOTES
Pharmacy Pharmacotherapy Consult for LOS >30 days    Admit Date: 2/9/2021      Medications were reviewed for appropriateness and ongoing need.     Current Facility-Administered Medications   Medication Dose Route Frequency Provider Last Rate Last Admin   • tamsulosin (FLOMAX) capsule 0.4 mg  0.4 mg Oral AFTER BREAKFAST Sebastián Juarez M.D.   0.4 mg at 06/14/21 1533   • hydrocortisone (CORTEF) tablet 5 mg  5 mg Oral BID Sebastián Juarez M.D.   5 mg at 06/14/21 1405   • nystatin (MYCOSTATIN) powder   Topical BID Sebastián Juarez M.D.   Given at 06/14/21 0431   • NS infusion   Intravenous Continuous Sebastián Juarez M.D. 100 mL/hr at 06/14/21 1429 New Bag at 06/14/21 1429   • LORazepam (ATIVAN) tablet 0.5 mg  0.5 mg Oral QDAY PRN Brittany Montana M.D.       • metoclopramide (REGLAN) tablet 10 mg  10 mg Oral Q6HRS PRN Brittany Montana M.D.       • HYDROmorphone (Dilaudid) injection 0.5 mg  0.5 mg Intravenous BID PRN Brittany Montana M.D.   0.5 mg at 06/14/21 0908   • ciprofloxacin (CIPRO) tablet 500 mg  500 mg Oral Q12HRS Conor Rodriguez M.D.   500 mg at 06/14/21 0431   • metroNIDAZOLE (FLAGYL) tablet 500 mg  500 mg Oral Q8HRS Conor Rodriguez M.D.   500 mg at 06/14/21 1406   • amiodarone (Cordarone) tablet 200 mg  200 mg Oral TWICE DAILY Conor Rodriguez M.D.   200 mg at 06/14/21 0431   • doxycycline monohydrate (ADOXA) tablet 100 mg  100 mg Oral Q12HRS Conor Rodriguez M.D.   100 mg at 06/14/21 0431   • vitamin D (cholecalciferol) tablet 1,000 Units  1,000 Units Oral DAILY Conor Rodriguez M.D.   1,000 Units at 06/14/21 0431   • apixaban (ELIQUIS) tablet 5 mg  5 mg Oral BID Conor Rodriguez M.D.   5 mg at 06/14/21 0431   • levothyroxine (SYNTHROID) tablet 125 mcg  125 mcg Oral AM ES Ashok Lazo M.D.   125 mcg at 06/14/21 0431   • docusate sodium (COLACE) capsule 100 mg  100 mg Oral BID DANGELO Gross.O.   100 mg at 06/05/21 0625   • oxyCODONE immediate-release (ROXICODONE) tablet 5-10 mg  5-10 mg Oral Q3HRS PRN DANGELO Gross.O.   10 mg at 06/14/21  0430   • ondansetron (ZOFRAN) syringe/vial injection 4 mg  4 mg Intravenous Q4HRS PRN Cassandra Fofana M.D.   4 mg at 06/05/21 0244   • labetalol (NORMODYNE/TRANDATE) injection 10 mg  10 mg Intravenous Q6HRS PRN Cassandra Fofana M.D.       • senna-docusate (PERICOLACE or SENOKOT S) 8.6-50 MG per tablet 2 tablet  2 tablet Oral DAILY Brennan Jarrett M.D.   2 tablet at 06/05/21 0625   • glucose 4 g chewable tablet 16 g  16 g Oral Q15 MIN PRN Jose De Jesus Villela, A.P.R.N.        And   • dextrose 50% (D50W) injection 50 mL  50 mL Intravenous Q15 MIN PRN Jose De Jesus Villela, A.P.R.N.       • PARoxetine (PAXIL) tablet 20 mg  20 mg Oral DAILY Trent Decker M.D.   20 mg at 06/14/21 0901   • clonazePAM (KLONOPIN) tablet 0.5 mg  0.5 mg Oral DAILY Trent Decker M.D.   0.5 mg at 06/14/21 0901   • clonazePAM (KLONOPIN) tablet 1 mg  1 mg Oral Q EVENING Trent Decker M.D.   1 mg at 06/13/21 1735   • ziprasidone (GEODON) capsule 60 mg  60 mg Oral BID Trent Decker M.D.   60 mg at 06/14/21 0901   • acetaminophen (Tylenol) tablet 650 mg  650 mg Oral Q4HRS PRN Trent Decker M.D.   650 mg at 06/06/21 1730   • ondansetron (ZOFRAN ODT) dispertab 4 mg  4 mg Oral Q4HRS PRN Carmel Phillips M.D.   4 mg at 06/04/21 1829   • atorvastatin (LIPITOR) tablet 40 mg  40 mg Oral Q EVENING Carmel Phillips M.D.   40 mg at 06/13/21 1735       Recommendations:    -discontinue labetalol 10 mg injection due to lack of use  -discontinue lorazepam 0.5 mg due to lack of use  -discontinue metoclopramide 10 mg due to lack of use and DDI with antipsychotics  -discontinue docusate due to normal bowel movements  -discontinue glucose tablet and dextrose injection due to absence of insulin      Cami Godoy, Pharmacy Intern

## 2021-06-14 NOTE — PROGRESS NOTES
Dr. Juarez notified of PVR scan value of 427. MD told RN to recheck at 1600 and encourage voiding.

## 2021-06-14 NOTE — WOUND TEAM
[facility]  Inpatient Services  Wound and Skin Care Progress Note    Admission Date: 2/9/2021     Last order of IP CONSULT TO WOUND CARE was found on 3/18/2021 from Hospital Encounter on 2/9/2021     HPI, PMH, SH: Reviewed    Past Surgical History:   Procedure Laterality Date   • PB EXPLORATORY OF ABDOMEN  5/27/2021    Procedure: LAPAROTOMY, EXPLORATORY;  Surgeon: Kin Desouza D.O.;  Location: [facility];  Service: General   • COLOSTOMY TAKEDOWN  5/27/2021    Procedure: COLOSTOMY TAKEDOWN AND CLOSURE;  Surgeon: Kin Desouza D.O.;  Location: SURGERY [facility];  Service: General   • COLECTOMY N/A 11/10/2020    Procedure: COLECTOMY-SEGMENTAL, COLOSTOMY, MOBILIZATION OF SPLENIC FLEXURE, WOUND VAC PLACEMENT, IRRIGATION AND DEBRIDMENT FLANK WOUND;  Surgeon: Kin Desouza D.O.;  Location: [facility];  Service: General   • ZZZ CARDIAC CATH  07/21/2018    EF 45%, normal coronaries.   • IRRIGATION & DEBRIDEMENT ORTHO Right 2/19/2018    Procedure: IRRIGATION & DEBRIDEMENT ORTHO-FOOT;  Surgeon: Kirby Lopez M.D.;  Location: [facility];  Service: Orthopedics   • TOE AMPUTATION Right 1/17/2018    Procedure: TOE AMPUTATION-1ST RAY;  Surgeon: Doug Delong M.D.;  Location: [facility];  Service: Orthopedics   • TOE AMPUTATION Right 11/10/2017    Procedure: TOE AMPUTATION;  Surgeon: Osorio Leo M.D.;  Location: [facility];  Service: Orthopedics     Social History     Tobacco Use   • Smoking status: Never Smoker   • Smokeless tobacco: Never Used   Substance Use Topics   • Alcohol use: No     Chief Complaint   Patient presents with   • Syncope     X1 this morning when standing up to go to bathroom     Diagnosis: Syncope and collapse [R55]  Suicidal ideation [R45.851]  Sepsis due to Klebsiella pneumoniae (HCC) [A41.4]    Unit where seen by Wound Team: T418/00     WOUND CONSULT/FOLLOW UP RELATED TO: abdominal wound    WOUND HISTORY:  Pt is an older  gentleman well known to [facility] wound team 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 Pt currently admitted since 2/9/21 related to SI concerns due to body image issues related to colostomy.      *Pt underwent surgical reversal of colostomy with Dr. Desouza on 5/20/21. MLI was re-opened at that time and was then closed with interrupted staples and packing. Unfortunately MLI continued to Dehisce and therefore wound team was consulted to further manage MLI with dakins packing.     6/3 Wound vac placed    WOUND ASSESSMENT/LDA      Negative Pressure Wound Therapy 06/03/21 Abdomen (Active)   Vacuum Serial Number [id number] 06/14/21 0900   NPWT Pump Mode / Pressure Setting Ulta    Dressing Type Small;Black Foam (Veraflo);Gray Foam (Cleanse Choice)    Number of Foam Pieces Used 3    Canister Changed No    Output (mL) 25 mL    NEXT Dressing Change/Treatment Date 06/16/21    VAC VeraFlo Irrigant Normal Saline    VAC VeraFlo Soak Time (mins) 10    VAC VeraFlo Instill Volume (ml) 24    VAC VeraFlo - Therapy Time (hrs) 2.5    VAC VeraFlo Pressure (mm/Hg) Intermittent;125 mmHg    WOUND NURSE ONLY - Time Spent with Patient (mins) 60      Wound 05/27/21 Full Thickness Wound Abdomen (Active)   Wound Image   06/05/21 0900   Site Assessment Yellow;Red;Slough;Painful    Periwound Assessment Clean;Blistered;Scar tissue    Margins Attached edges;Defined edges    Closure Secondary intention;Adhesive bandage    Drainage Amount Small    Drainage Description Serosanguineous    Treatments Cleansed;Site care    Wound Cleansing Approved Wound Cleanser    Periwound Protectant Skin Protectant Wipes to Periwound;Paste Ring;Hydrocolloid;Mepitel;Drape    Dressing Cleansing/Solutions Normal Saline    Dressing Options Wound Vac    Dressing Changed Changed    Dressing Status Clean;Dry;Intact    Dressing Change/Treatment Frequency Monday, Wednesday, Friday, and As Needed    NEXT Dressing Change/Treatment Date 06/16/21    NEXT Weekly Photo  (Inpatient Only) 06/16/21    Non-staged Wound Description Full thickness    Wound Length (cm) 20.3 cm    Wound Width (cm) 7.3 cm    Wound Depth (cm) 2 cm    Wound Surface Area (cm^2) 148.19 cm^2    Wound Volume (cm^3) 296.38 cm^3    Wound Healing % -262    Tunneling (cm) 1 cm    Tunneling Clock Position of Wound 12    Tunneling - 2nd Location (cm) 0.7 cm    Tunneling Clock Position of Wound - 2nd Location 6    Shape Irregular    Wound Odor None    Pulses N/A    Exposed Structures None    WOUND NURSE ONLY - Time Spent with Patient (mins) 60      Vascular:    SHAYAN:   No results found.    Lab Values:    Lab Results   Component Value Date/Time    WBC 14.6 (H) 06/12/2021 12:30 AM    RBC 4.21 (L) 06/12/2021 12:30 AM    HEMOGLOBIN 12.0 (L) 06/12/2021 12:30 AM    HEMATOCRIT 38.4 (L) 06/12/2021 12:30 AM    CREACTPROT 0.48 06/10/2021 04:05 AM    SEDRATEWES 13 10/23/2018 08:02 AM    HBA1C 5.2 02/14/2021 01:30 AM      Culture Results show:  No results found for this or any previous visit (from the past 720 hour(s)).    Pain Level/Medicated:  IV Morphine immediately prior to dressing change      INTERVENTIONS BY WOUND TEAM:  Chart and images reviewed. Discussed with bedside RN. All areas of concern (based on picture review, LDA review and discussion with bedside RN) have been thoroughly assessed. Documentation of areas based on significant findings. This RN in to assess patient. Performed standard wound care which includes appropriate positioning, dressing removal and non-selective debridement. Pictures and measurements obtained weekly if/when required.              ABD:  Preparation for Dressing removal:  Soaked with NS from NPWT machine  Cleansed with: Wound cleanser and gauze   Sharp debridement: scant debridement with forceps and scissors, no bleeding noted.   Joya wound: Cleansed with Wound cleanser and gauze, No sting skin and paste ring x 3, mepitel one to distal and proximal staples. Hydrocolloid thin to deroofed  bullae  Primary Dressin piece of grey waffle layer to fascial line sutures and base of wound bed, remaining wound bed filled with complete small VF black foam, edges pulled together and sealed with drape.   Secondary (Outer) Dressing:  Sealed with drape, TRAC pad applied.     Interdisciplinary consultation: Patient, Bedside RN (Gerardo), Dr. Desouza      EVALUATION / RATIONALE FOR TREATMENT:  Most Recent Date:  21: Wound continues to granulate. Small amount of slough over fascial line. Discussed with Dr. Desouza. Plan to continue with veraflo, settings decreased. Will evaluate the wound with Dr. Desouza on Wednesday at 9am. Per MD may consider OR washout with staples to distal proximal aspect to assist in wound closure vs. Adding staples at bedside.     21:  Wound looks good, minimal slough mainly to fascial line sutures.  VF grey foam layer to assist in debridement of slough.  Dr. Desouza to re-assess Monday and do surgical debridement if needed next week.   21:  Wound continues to progress, pull edges together to lessen width of wound. Dr. Desouza would like to see wound on : Pt's wound appears to be improving per measurements and pictures. There is granulation tissue present.   21: Wound bed improving with increased granulation tissue.  Wound edges approximated prior to drape application.  NPWT continued.    21:  Fascial sutures intact, wound VAC placed with DR. Desouza.    21: Wound appears  than yesterdays pictures. Discussed with Dr. Desouza, plan to continue dakins TID for 48hrs, CT scan ordered today. Will re-evaluate on Thursday for potential vac placement.   21: Wound initially with interrupted staples and packing now with dehiscence. Per Dr. Baeza request wound team initiated dakins packing. Pt may benefit from NPWT to assist with closure.   21: wound nearly healed. Does not require advanced wound care. Wound team signing off and nursing to re  consult if site worsens. Continue with dressing care per order.  4/21/21: abd wound improving. Nursing to continue dsg changes per order.  4/15: Right posterior thigh DTI now resolved. Pink and blanching. Abdominal wound to surface level continuing with dressing POC.  4/7/21: Thigh wound nearly healed and will likely be resolved at next assessment. Abdomen wound appears larger. Continued with Arti to move through inflammatory phase. Hydrofera blue for its bacteriostatic properties and to assist in drainage management.   03/31/21: thigh wound healing well now to a stage 2.  Continue current plan.  All interventions in place.   ABD more denuded and bloody.  Arti to assist in moving wound through inflammatory phase.   3/23/21: Right posterior thigh wound remains a DTI but appears to be evolving into a stage 2. Wound team was previously consulted regarding sacral wound which appeared to be a skin tear however skin now appears to be intact however discolored. Does not appear to be pressure related as pt is on a waffle, self mobile and sacral mepilex is in use. Area is also not a normal pressure injury shape. Wound team will continue to follow.   3/18/21: Pt with linear DTIs to R posterior thigh from lying on top of frye cath tubing. Offloading precautions ordered. Wound team to continue following.     Goals: Steady decrease in wound area and depth weekly.    WOUND TEAM PLAN OF CARE ([X] for frequency of wound follow up,):   Nursing to follow orders written for wound care. Contact wound team if area fails to progress, deteriorates or with any questions/concerns  Dressing changes by wound team:                   Follow up 3 times weekly:                NPWT change 3 times weekly:   X  Follow up 1-2 times weekly:     Follow up Bi-Monthly:                   Follow up as needed:   Other (explain):     NURSING PLAN OF CARE ORDERS (X):  Dressing changes: See Dressing Care orders: X  Skin care: See Skin Care orders: x  RN  Prevention Protocol: x  Rectal tube care: See Rectal Tube Care orders:   Other orders:        Anticipated discharge plans:   LTACH:        SNF/Rehab:                  Home Health Care:           Outpatient Wound Center:            Self/Family Care:        Other:   Group Home

## 2021-06-14 NOTE — PROGRESS NOTES
Surgery    Wound care this AM    /69   Pulse 76   Temp 36.6 °C (97.8 °F) (Temporal)   Resp 16   Ht 1.829 m (6')   Wt 115 kg (253 lb 8.5 oz)   SpO2 93%   BMI 34.38 kg/m²     NAD  Abd soft  Ostomy site healing  Wound improved with fibrinous area upper midline 1cm x 4-5cm  Granulation throughout  VAC replaced by wound care team    A/P  Midline wound healing issues after colostomy takedown  Continue Veriflo VAC  Considering partial sequential closure vs debridement depending on finding at next VAC change.

## 2021-06-15 LAB
ALBUMIN SERPL BCP-MCNC: 2.5 G/DL (ref 3.2–4.9)
ALBUMIN/GLOB SERPL: 0.8 G/DL
ALP SERPL-CCNC: 77 U/L (ref 30–99)
ALT SERPL-CCNC: 12 U/L (ref 2–50)
ANION GAP SERPL CALC-SCNC: 8 MMOL/L (ref 7–16)
AST SERPL-CCNC: 21 U/L (ref 12–45)
BASOPHILS # BLD AUTO: 0.6 % (ref 0–1.8)
BASOPHILS # BLD: 0.06 K/UL (ref 0–0.12)
BILIRUB SERPL-MCNC: 0.3 MG/DL (ref 0.1–1.5)
BUN SERPL-MCNC: 14 MG/DL (ref 8–22)
CALCIUM SERPL-MCNC: 8.1 MG/DL (ref 8.5–10.5)
CHLORIDE SERPL-SCNC: 110 MMOL/L (ref 96–112)
CO2 SERPL-SCNC: 22 MMOL/L (ref 20–33)
CREAT SERPL-MCNC: 1.68 MG/DL (ref 0.5–1.4)
EOSINOPHIL # BLD AUTO: 0.57 K/UL (ref 0–0.51)
EOSINOPHIL NFR BLD: 5.5 % (ref 0–6.9)
ERYTHROCYTE [DISTWIDTH] IN BLOOD BY AUTOMATED COUNT: 53.5 FL (ref 35.9–50)
GLOBULIN SER CALC-MCNC: 3.1 G/DL (ref 1.9–3.5)
GLUCOSE SERPL-MCNC: 108 MG/DL (ref 65–99)
HCT VFR BLD AUTO: 35.3 % (ref 42–52)
HGB BLD-MCNC: 11.2 G/DL (ref 14–18)
IMM GRANULOCYTES # BLD AUTO: 0.05 K/UL (ref 0–0.11)
IMM GRANULOCYTES NFR BLD AUTO: 0.5 % (ref 0–0.9)
LYMPHOCYTES # BLD AUTO: 2.08 K/UL (ref 1–4.8)
LYMPHOCYTES NFR BLD: 20 % (ref 22–41)
MAGNESIUM SERPL-MCNC: 1.2 MG/DL (ref 1.5–2.5)
MCH RBC QN AUTO: 28.9 PG (ref 27–33)
MCHC RBC AUTO-ENTMCNC: 31.7 G/DL (ref 33.7–35.3)
MCV RBC AUTO: 91 FL (ref 81.4–97.8)
MONOCYTES # BLD AUTO: 0.99 K/UL (ref 0–0.85)
MONOCYTES NFR BLD AUTO: 9.5 % (ref 0–13.4)
NEUTROPHILS # BLD AUTO: 6.67 K/UL (ref 1.82–7.42)
NEUTROPHILS NFR BLD: 63.9 % (ref 44–72)
NRBC # BLD AUTO: 0 K/UL
NRBC BLD-RTO: 0 /100 WBC
PLATELET # BLD AUTO: 301 K/UL (ref 164–446)
PMV BLD AUTO: 10.5 FL (ref 9–12.9)
POTASSIUM SERPL-SCNC: 3.6 MMOL/L (ref 3.6–5.5)
PROT SERPL-MCNC: 5.6 G/DL (ref 6–8.2)
RBC # BLD AUTO: 3.88 M/UL (ref 4.7–6.1)
SODIUM SERPL-SCNC: 140 MMOL/L (ref 135–145)
WBC # BLD AUTO: 10.4 K/UL (ref 4.8–10.8)

## 2021-06-15 PROCEDURE — 770001 HCHG ROOM/CARE - MED/SURG/GYN PRIV*

## 2021-06-15 PROCEDURE — A9270 NON-COVERED ITEM OR SERVICE: HCPCS | Performed by: INTERNAL MEDICINE

## 2021-06-15 PROCEDURE — 700102 HCHG RX REV CODE 250 W/ 637 OVERRIDE(OP): Performed by: INTERNAL MEDICINE

## 2021-06-15 PROCEDURE — 700102 HCHG RX REV CODE 250 W/ 637 OVERRIDE(OP): Performed by: STUDENT IN AN ORGANIZED HEALTH CARE EDUCATION/TRAINING PROGRAM

## 2021-06-15 PROCEDURE — A9270 NON-COVERED ITEM OR SERVICE: HCPCS | Performed by: STUDENT IN AN ORGANIZED HEALTH CARE EDUCATION/TRAINING PROGRAM

## 2021-06-15 PROCEDURE — A9270 NON-COVERED ITEM OR SERVICE: HCPCS | Performed by: FAMILY MEDICINE

## 2021-06-15 PROCEDURE — 700102 HCHG RX REV CODE 250 W/ 637 OVERRIDE(OP): Performed by: SURGERY

## 2021-06-15 PROCEDURE — 99232 SBSQ HOSP IP/OBS MODERATE 35: CPT | Performed by: INTERNAL MEDICINE

## 2021-06-15 PROCEDURE — 80053 COMPREHEN METABOLIC PANEL: CPT

## 2021-06-15 PROCEDURE — 700102 HCHG RX REV CODE 250 W/ 637 OVERRIDE(OP): Performed by: FAMILY MEDICINE

## 2021-06-15 PROCEDURE — 85025 COMPLETE CBC W/AUTO DIFF WBC: CPT

## 2021-06-15 PROCEDURE — 83735 ASSAY OF MAGNESIUM: CPT

## 2021-06-15 PROCEDURE — 302146: Performed by: INTERNAL MEDICINE

## 2021-06-15 PROCEDURE — A9270 NON-COVERED ITEM OR SERVICE: HCPCS | Performed by: SURGERY

## 2021-06-15 RX ADMIN — HYDROCORTISONE 5 MG: 5 TABLET ORAL at 04:18

## 2021-06-15 RX ADMIN — DOCUSATE SODIUM 50 MG AND SENNOSIDES 8.6 MG 2 TABLET: 8.6; 5 TABLET, FILM COATED ORAL at 04:18

## 2021-06-15 RX ADMIN — NYSTATIN: 100000 POWDER TOPICAL at 04:13

## 2021-06-15 RX ADMIN — CLONAZEPAM 0.5 MG: 0.5 TABLET ORAL at 08:52

## 2021-06-15 RX ADMIN — APIXABAN 5 MG: 5 TABLET, FILM COATED ORAL at 18:32

## 2021-06-15 RX ADMIN — NYSTATIN: 100000 POWDER TOPICAL at 18:32

## 2021-06-15 RX ADMIN — HYDROCORTISONE 5 MG: 5 TABLET ORAL at 16:18

## 2021-06-15 RX ADMIN — Medication 1000 UNITS: at 04:18

## 2021-06-15 RX ADMIN — LEVOTHYROXINE SODIUM 125 MCG: 0.12 TABLET ORAL at 04:18

## 2021-06-15 RX ADMIN — OXYCODONE 10 MG: 5 TABLET ORAL at 20:46

## 2021-06-15 RX ADMIN — DOCUSATE SODIUM 100 MG: 100 CAPSULE, LIQUID FILLED ORAL at 04:18

## 2021-06-15 RX ADMIN — CLONAZEPAM 1 MG: 1 TABLET ORAL at 18:32

## 2021-06-15 RX ADMIN — TAMSULOSIN HYDROCHLORIDE 0.4 MG: 0.4 CAPSULE ORAL at 08:52

## 2021-06-15 RX ADMIN — PAROXETINE HYDROCHLORIDE 20 MG: 20 TABLET, FILM COATED ORAL at 08:52

## 2021-06-15 RX ADMIN — AMIODARONE HYDROCHLORIDE 200 MG: 200 TABLET ORAL at 04:18

## 2021-06-15 RX ADMIN — APIXABAN 5 MG: 5 TABLET, FILM COATED ORAL at 04:18

## 2021-06-15 RX ADMIN — ZIPRASIDONE HYDROCHLORIDE 60 MG: 60 CAPSULE ORAL at 20:46

## 2021-06-15 RX ADMIN — ZIPRASIDONE HYDROCHLORIDE 60 MG: 60 CAPSULE ORAL at 09:40

## 2021-06-15 RX ADMIN — ATORVASTATIN CALCIUM 40 MG: 40 TABLET, FILM COATED ORAL at 18:32

## 2021-06-15 RX ADMIN — AMIODARONE HYDROCHLORIDE 200 MG: 200 TABLET ORAL at 18:32

## 2021-06-15 ASSESSMENT — ENCOUNTER SYMPTOMS
FLANK PAIN: 0
SENSORY CHANGE: 0
HEARTBURN: 0
SHORTNESS OF BREATH: 0
DIARRHEA: 0
HALLUCINATIONS: 0
CONSTIPATION: 0
COUGH: 0
NECK PAIN: 0
PALPITATIONS: 0
FEVER: 0
NAUSEA: 0
NERVOUS/ANXIOUS: 0
VOMITING: 0
CHILLS: 0
FOCAL WEAKNESS: 0
DEPRESSION: 0
WEAKNESS: 1
WHEEZING: 0
SPEECH CHANGE: 0
DIZZINESS: 0
DIAPHORESIS: 0
ABDOMINAL PAIN: 0
BACK PAIN: 0
MYALGIAS: 0
HEADACHES: 0
SORE THROAT: 0
BLURRED VISION: 0

## 2021-06-15 ASSESSMENT — PAIN DESCRIPTION - PAIN TYPE
TYPE: ACUTE PAIN
TYPE: ACUTE PAIN

## 2021-06-15 NOTE — CARE PLAN
Problem: Nutritional:  Goal: Achieve adequate nutritional intake  Description: Patient will consume >50% of meals  Outcome: Met

## 2021-06-15 NOTE — PROGRESS NOTES
Assessment complete.  AA&Ox4.   SpO2 97% on RA. Denies SOB.  Reporting 7/10 pain. Medicated per MAR.   Midline incision w/ wound vac.   Tolerating low fiber GI soft diet. Denies N/V.  + void.   LBM 6/14/21.   Pt up with max assist to edge of bed.   All needs met at this time. Call light within reach. Pt calls appropriately.

## 2021-06-15 NOTE — PROGRESS NOTES
[facility] Medicine Daily Progress Note    Date of Service  6/15/2021    Chief Complaint  56 y.o. male admitted 2/9/2021 with Syncope.    Hospital Course  56-year-old male with history of atrial fibrillation, diabetes, adrenal insufficiency, depression and obesity presented 2/9 with syncope and bradycardia with hypotension. Patient was on diltiazem, digoxin and metoprolol which all were held; once, bradycardia had improved and resumed metoprolol and digoxin with later switched to amiodarone by cardiologist, Eliquis was started for anticoagulation.    He also had a possible history of adrenal insufficiency, he was already on Florinef and midodrine.  Midodrine was discontinued and he was started on cortisol. Also florinef was discontinued since volume overload.     Further work-up showed that he had gram-negative bacteremia as well as urinary tract infection and acute kidney injury.  He was initially placed on IV Zosyn, later changed to oral Bactrim to finish the course.      On admission, he also expressed suicidal ideation.  Psychiatry was consulted the case, he was placed on a legal hold.  He was noted to have severe depression.  He has been placed on medication the form of Geodon, Paxil, Klonopin.  He has a colostomy in place, which the patient believes contributes to his severe depression because of ostomy care. Legal hold though discontinued because pt did improve significantly once ostomy was reversed.     He had history of bowel perforation and splenic fluid collection requiring Segmental colectomy, colostomy creation, mobilization of splenic flexure, wound VAC placement, irrigation and debridement of flank wound on 11/10/2020.  Surgery was consulted on the case, and he underwent exploratory laparotomy and colostomy takedown and closure on 5/27/2021. Pt was followed by wound care post op with wound vac interval changes.    Patient lives by himself and is not able to take care of himself at this time, PT and OT  evaluated the patient and recommended SNF    Interval Problem Update  6/15- he is feeling well overall. He had two bowel movement. No diarrhea. He is volume overload. Stop IVF. Continue to monitor. Consider start dose of lasix in the future. Still remain tachycardic. To consider to increase amiodarone ?? Or add metoprolol. Pt very sensitive to BP meds    Consultants/Specialty  Cardiology  Psychiatry  Surgery    Code Status  Full Code    Disposition  SNF placement pending   Patient needs help for daily activities  Patient is medically cleared    Discussed with patient, patient's nurse and with multidisciplinary team during rounds including , pharmacist and charge nurse.      I have performed the physical examination, and reviewed updated ROS and plan today 6/14/2021.  In review of yesterday's note, there are no new changes except as documented above or bolded below.      Review of Systems  Review of Systems   Constitutional: Positive for malaise/fatigue. Negative for chills, diaphoresis and fever.   HENT: Negative for congestion, hearing loss and sore throat.    Eyes: Negative for blurred vision.   Respiratory: Negative for cough, shortness of breath and wheezing.    Cardiovascular: Negative for chest pain, palpitations and leg swelling.   Gastrointestinal: Negative for abdominal pain, constipation, diarrhea, heartburn, nausea and vomiting.   Genitourinary: Negative for dysuria, flank pain and hematuria.   Musculoskeletal: Negative for back pain, joint pain, myalgias and neck pain.   Skin: Negative for rash.   Neurological: Positive for weakness. Negative for dizziness, sensory change, speech change, focal weakness and headaches.   Psychiatric/Behavioral: Negative for depression, hallucinations and suicidal ideas. The patient is not nervous/anxious.       Physical Exam  Temp:  [36.1 °C (97 °F)-36.3 °C (97.3 °F)] 36.3 °C (97.3 °F)  Pulse:  [] 89  Resp:  [16-18] 16  BP: ()/(62-79) 117/79  SpO2:   [93 %-97 %] 95 %    Physical Exam  Vitals and nursing note reviewed.   Constitutional:       Appearance: He is obese.   HENT:      Head: Normocephalic and atraumatic.      Nose: No congestion.      Mouth/Throat:      Mouth: Mucous membranes are moist.   Eyes:      Extraocular Movements: Extraocular movements intact.      Conjunctiva/sclera: Conjunctivae normal.      Pupils: Pupils are equal, round, and reactive to light.   Cardiovascular:      Rate and Rhythm: Normal rate. Rhythm irregular.   Pulmonary:      Effort: Pulmonary effort is normal. No respiratory distress.      Breath sounds: No wheezing or rales.   Abdominal:      General: There is no distension.      Tenderness: There is no abdominal tenderness. There is no guarding or rebound.      Comments: Wound vac in place    Musculoskeletal:      Cervical back: No muscular tenderness.      Right lower leg: No edema.      Left lower leg: No edema.   Skin:     General: Skin is warm and dry.      Findings: No bruising.   Neurological:      General: No focal deficit present.      Mental Status: He is alert and oriented to person, place, and time.      Cranial Nerves: No cranial nerve deficit.      Motor: No weakness.   Psychiatric:         Mood and Affect: Mood is not anxious.         Speech: Speech normal.         Thought Content: Thought content is not paranoid or delusional. Thought content does not include homicidal or suicidal ideation. Thought content does not include homicidal or suicidal plan.         Judgment: Judgment is not impulsive.       Fluids    Intake/Output Summary (Last 24 hours) at 6/15/2021 1159  Last data filed at 6/15/2021 0800  Gross per 24 hour   Intake 1540 ml   Output 675 ml   Net 865 ml       Laboratory      Recent Labs     06/13/21  0107   SODIUM 139   POTASSIUM 3.6   CHLORIDE 108   CO2 22   GLUCOSE 94   BUN 15   CREATININE 1.65*   CALCIUM 8.5                   Imaging  US-RENAL   Final Result      1.  Low-level echogenic debris in the  urinary bladder is noted which could indicate UTI or blood within the bladder.      2.  No hydronephrosis.      IR-MIDLINE CATHETER INSERTION WO GUIDANCE > AGE 3   Final Result                  Ultrasound-guided midline placement performed by qualified nursing staff    as above.          EC-ECHOCARDIOGRAM COMPLETE W/ CONT   Final Result      DX-CHEST-PORTABLE (1 VIEW)   Final Result      1.  Minor asymmetric interstitial opacity in the right lower lobe which could represent minor interstitial edema, pneumonia, or fibrotic change.      2.  No left lung consolidation.      3.  No evidence of congestive failure.      [id number] VIEW   Final Result         No specific finding to suggest small bowel obstruction.      [id number] VIEW   Final Result         No significant interval change.      DX-CHEST-LIMITED (1 VIEW)   Final Result         Diffuse interstitial prominence could relate to mild pulmonary edema or atypical infection      [id number] VIEW   Final Result      Increased colonic gas without definite bowel obstruction.      US-RENAL   Final Result      No evidence of hydronephrosis.      Lobulated kidneys bilaterally.      CT-ABDOMEN-PELVIS WITH   Final Result      1.  There is no evidence of small bowel obstruction.   2.  There is a left lower quadrant ostomy.   3.  There is fluid distention of the colon distal to the surgical material in the left mid descending colon extending all the way to the rectum. There is no pneumatosis or free air.   4.  There is cholelithiasis without biliary dilatation.   5.  There has been interval removal of the drainage catheter anterior to the spleen with minimal hypodense area in the anterior spleen again noted. There is no new fluid collection.      IR-US GUIDED PIV   Final Result    Ultrasound-guided PERIPHERAL IV INSERTION performed by    qualified nursing staff as above.      EC-ECHOCARDIOGRAM COMPLETE W/O CONT   Final Result      DX-LUMBAR SPINE-2 OR 3 VIEWS   Final  Result      Moderate compression deformity of T11 is new compared to 2018.      Mild wedge deformity of T12 is unchanged.      Degenerative changes including facet arthropathy.      Mild retrolisthesis of L5 on S1 and L3 on L4.              Assessment/Plan  * Debility- (present on admission)  Assessment & Plan  PT and OT; SNF  vit d level low 5/29- start replacement   Patient has generalized weakness  Poor prognosis    Leukocytosis- (present on admission)  Assessment & Plan  Around 12-13  No fever or chills  Continue his medications  Consider repeat CT scan for abdomen if needed    Obstructive uropathy- (present on admission)  Assessment & Plan  Bladder scan and straight cath prn    Permanent atrial fibrillation (HCC)- (present on admission)  Assessment & Plan  6/7- a-fib persistent. Cardiology consulted again to start amiodarone   Stop digoxin high risk toxicity with SRUTHI   Stop metoprolol since hypotensive   Continue eliquis   6/15- still sometimes RVR. Continue to monitor.     Chronic venous insufficiency of lower extremity- (present on admission)  Assessment & Plan  compression stockings.    Chronic heart failure with preserved ejection fraction (HCC)- (present on admission)  Assessment & Plan  Toprol on hold due to low BP and orthostatic hypo  C/w Atorvastatin  Monitor fluid status and Cr    Type 2 diabetes mellitus with hyperglycemia, with long-term current use of insulin (HCC)- (present on admission)  Assessment & Plan  Follow bmp  Currently not requiring Insulin    Major depressive disorder, recurrent, severe with psychotic features (HCC)- (present on admission)  Assessment & Plan  Geodon, Paxil, Klonopin  Psychiatry following     Colostomy present (HCC)- (present on admission)  Assessment & Plan  Exploratory laparotomy with colostomy takedown and closure 5/27/2021  Segmental colectomy, colostomy creation, mobilization of splenic flexure, wound VAC placement, irrigation and debridement of flank wound on  11/10/2020.  Improving on his leukocytosis  No fever or chills and wound VAC in place   6/15- off antibiotic   working for SNF        Hypothyroidism- (present on admission)  Assessment & Plan  Increased Levothyroxine to 125 mcg  Check TSH on 6/29/2021    SRUTHI (acute kidney injury) (HCC)- (present on admission)  Assessment & Plan  6/1- resolved   FeNa showed prerenal  6/15- stable. Stop IVF. Continue to monitor. Consider to add lasix later on if BP stable     Adrenal insufficiency (HCC)- (present on admission)  Assessment & Plan  Likely  Redcued corticef 5 mg BID 6/13  And then corticef 5 mg daily on 7/4  He need endo evaluation as outpatient    Suicidal ideation- (present on admission)  Assessment & Plan  6/4- legal hold discontinued.   Possible SNF placement     Orthostatic hypotension- (present on admission)  Assessment & Plan  Stop florinef. Consider midodrine once heart rate improving   Florinef increase risk of water retention, poor wound healing and he is already on hydrocortisone   Stop metoprolol or other BP meds      Mixed hyperlipidemia- (present on admission)  Assessment & Plan  Atorvastatin.     Lower limb amputation, great toe (HCC)- (present on admission)  Assessment & Plan  Monitor  No signs of infection     Hypomagnesemia- (present on admission)  Assessment & Plan  Monitor and replace as needed     Class 2 severe obesity with serious comorbidity and body mass index (BMI) of 35.0 to 35.9 in adult (HCC)- (present on admission)  Assessment & Plan  Body mass index is 35.19 kg/m².    Hypokalemia- (present on admission)  Assessment & Plan  Monitor and replete as needed       VTE prophylaxis: eliquis       Interventions to be considered in all patients in order to minimize the risk of delirium.   -do not disturb patient (vitals or lab draws) between the hours of 10 PM and 6 AM.  -ideally the patient should not sleep during the day and we should avoid day time naps.   -up in chair for meals  -ambulate at  least three times daily, as able  -watch for constipation  -timed voiding - ask patient is she would like to go to the bathroom q 2-3 hours, except during the do not disturb hours.   -remove all necessary lines (central lines, peripheral IVs, feeding tubes, frye catheters)  -unless patient has shown harm to self or others I would recommend against use of restraints - either chemical or physical (antipsychotics)   -minimize polypharmacy, do not dose medication during sleep hours

## 2021-06-15 NOTE — PROGRESS NOTES
"      Spiritual Care Note    Patient Information     Patient's Name: Sebastián Castro   MRN: [id number]    YOB: 1965   Age and Gender: 56 y.o. male   Service Area: U   Room (and Bed): [place] [address]   Ethnicity or Nationality:     Primary Language: English   Congregational/Spiritual preference: Sikh   Place of Residence: [place]   Family/Friends/Others Present: No   Clinical Team Present: No   Medical Diagnosis(-es)/Procedure(s): Syncope and collapse   Code Status: Full Code    Date of Admission: 2/9/2021   Length of Stay: 125 days        Spiritual Care Provider Information:  Name of Spiritual Care Provider: Ammy Hess  Title of Spiritual Care Provider: Associate   Phone Number: [phone]  E-mail: [email or web address]  Total time : 10 minutes    Encounter/Request Information  Encounter/Request Type   Visited With: Patient  Nature of the Visit: Follow-up, On shift  General Visit: Yes  Referral From/ Origin of Request: [place] rounds, Verbal patient (Complex Discharge rounds)  Referral To:  (-)    Religous Needs/Values  Congregational Needs Visit  Congregational Needs: Prayer    Spiritual Assessment     Spiritual Care Encounters    Observations/Symptoms: Accepting, Thankfulness    Interaction/Conversation: Pt stated that he is feeling \"a little bit better,\" and said it is difficult to be patient with the slow healing process. He reports that he draws spiritual and emotional sustenance from the Psalms. \"I've been through them a couple of times now, and i always find new things.\" He requested prayer and thanked the , who will continue to visit.    Assessment: Need    Need: Seeking Spiritual Assistance and Support    Distress: Anxiety about the Future, Overwhelmed    Interventions: Compassionate presence, active listening, prayer.    Outcomes: Ability to Communicate with Truth and Honesty, Spiritual Comfort    Plan: Weekly Visits    Notes:            "

## 2021-06-15 NOTE — CARE PLAN
The patient is Stable - Low risk of patient condition declining or worsening    Shift Goals  Clinical Goals: Out of bed to chair today, wound vac change by wound team  Patient Goals: Pain control  Family Goals: n/a    Progress made toward(s) clinical / shift goals:  PT not able to come today, repositioned in bed, but not able to ambulate; wound vac changed by wound team and surgeon evaluated wound at bedside as well    Patient is not progressing towards the following goals: Pt only had one small smear stool today, not a full BM, did accept stool softener      Problem: Bowel Elimination  Goal: Establish and maintain regular bowel function  Outcome: Not Progressing      Problem: Pain - Standard  Goal: Alleviation of pain or a reduction in pain to the patient’s comfort goal  Outcome: Progressing

## 2021-06-15 NOTE — CARE PLAN
The patient is Stable - Low risk of patient condition declining or worsening    Shift Goals  Clinical Goals: Out of bed to chair today per Dr. Juarez, wound vac change by wound team  Patient Goals: pain control  Family Goals: n/a    Progress made toward(s) clinical / shift goals:  Pt reported 7/10 pain, medicated per MAR. Q 2 turns in place. Hourly rounding in place. Bed is locked and in lowest position. Call light within reach.     Patient is not progressing towards the following goals: NA    Problem: Knowledge Deficit - Standard  Goal: Patient and family/care givers will demonstrate understanding of plan of care, disease process/condition, diagnostic tests and medications  Outcome: Progressing     Problem: Depression  Goal: Patient and family/caregiver will verbalize accurate information about at least two of the possible causes of depression, three-four of the signs and symptoms of depression  Outcome: Progressing     Problem: Provide Safe Environment  Goal: Suicide environmental safety, protocols, policies, and practices will be implemented  Outcome: Progressing     Problem: Psychosocial  Goal: Patient's ability to identify and develop effective coping behaviors will improve  Outcome: Progressing  Goal: Patient's ability to identify and utilize available support systems will improve  Outcome: Progressing     Problem: Psychosocial  Goal: Patient's ability to re-evaluate and adapt role responsibilities will improve  Outcome: Progressing     Problem: Bowel Elimination  Goal: Establish and maintain regular bowel function  Outcome: Progressing     Problem: Fall Risk  Goal: Patient will remain free from falls  Outcome: Progressing     Problem: Pain - Standard  Goal: Alleviation of pain or a reduction in pain to the patient’s comfort goal  Outcome: Progressing     Problem: Mobility  Goal: Patient's capacity to carry out activities will improve  Outcome: Progressing

## 2021-06-16 PROCEDURE — 770001 HCHG ROOM/CARE - MED/SURG/GYN PRIV*

## 2021-06-16 PROCEDURE — 700111 HCHG RX REV CODE 636 W/ 250 OVERRIDE (IP): Performed by: INTERNAL MEDICINE

## 2021-06-16 PROCEDURE — 700102 HCHG RX REV CODE 250 W/ 637 OVERRIDE(OP): Performed by: INTERNAL MEDICINE

## 2021-06-16 PROCEDURE — A9270 NON-COVERED ITEM OR SERVICE: HCPCS | Performed by: INTERNAL MEDICINE

## 2021-06-16 PROCEDURE — 97606 NEG PRS WND THER DME>50 SQCM: CPT

## 2021-06-16 PROCEDURE — 99232 SBSQ HOSP IP/OBS MODERATE 35: CPT | Performed by: INTERNAL MEDICINE

## 2021-06-16 PROCEDURE — 302098 PASTE RING (FLAT): Performed by: INTERNAL MEDICINE

## 2021-06-16 RX ORDER — BISACODYL 10 MG
10 SUPPOSITORY, RECTAL RECTAL
Status: DISCONTINUED | OUTPATIENT
Start: 2021-06-16 | End: 2021-06-30

## 2021-06-16 RX ORDER — AMOXICILLIN 250 MG
2 CAPSULE ORAL 2 TIMES DAILY PRN
Status: DISCONTINUED | OUTPATIENT
Start: 2021-06-16 | End: 2021-06-30

## 2021-06-16 RX ORDER — MAGNESIUM SULFATE HEPTAHYDRATE 40 MG/ML
4 INJECTION, SOLUTION INTRAVENOUS ONCE
Status: COMPLETED | OUTPATIENT
Start: 2021-06-16 | End: 2021-06-16

## 2021-06-16 RX ORDER — POLYETHYLENE GLYCOL 3350 17 G/17G
1 POWDER, FOR SOLUTION ORAL
Status: DISCONTINUED | OUTPATIENT
Start: 2021-06-16 | End: 2021-06-30

## 2021-06-16 RX ORDER — FUROSEMIDE 20 MG/1
20 TABLET ORAL
Status: DISCONTINUED | OUTPATIENT
Start: 2021-06-16 | End: 2021-06-30

## 2021-06-16 RX ADMIN — APIXABAN 5 MG: 5 TABLET, FILM COATED ORAL at 04:55

## 2021-06-16 RX ADMIN — ATORVASTATIN CALCIUM 40 MG: 40 TABLET, FILM COATED ORAL at 17:38

## 2021-06-16 RX ADMIN — PAROXETINE HYDROCHLORIDE 20 MG: 20 TABLET, FILM COATED ORAL at 07:49

## 2021-06-16 RX ADMIN — OXYCODONE 10 MG: 5 TABLET ORAL at 20:17

## 2021-06-16 RX ADMIN — OXYCODONE 10 MG: 5 TABLET ORAL at 07:53

## 2021-06-16 RX ADMIN — ZIPRASIDONE HYDROCHLORIDE 60 MG: 60 CAPSULE ORAL at 09:45

## 2021-06-16 RX ADMIN — LEVOTHYROXINE SODIUM 125 MCG: 0.12 TABLET ORAL at 04:55

## 2021-06-16 RX ADMIN — AMIODARONE HYDROCHLORIDE 200 MG: 200 TABLET ORAL at 17:38

## 2021-06-16 RX ADMIN — CLONAZEPAM 1 MG: 1 TABLET ORAL at 17:38

## 2021-06-16 RX ADMIN — AMIODARONE HYDROCHLORIDE 200 MG: 200 TABLET ORAL at 04:55

## 2021-06-16 RX ADMIN — ZIPRASIDONE HYDROCHLORIDE 60 MG: 60 CAPSULE ORAL at 20:16

## 2021-06-16 RX ADMIN — HYDROMORPHONE HYDROCHLORIDE 0.5 MG: 1 INJECTION, SOLUTION INTRAMUSCULAR; INTRAVENOUS; SUBCUTANEOUS at 12:09

## 2021-06-16 RX ADMIN — APIXABAN 5 MG: 5 TABLET, FILM COATED ORAL at 17:38

## 2021-06-16 RX ADMIN — MAGNESIUM SULFATE IN WATER 4 G: 40 INJECTION, SOLUTION INTRAVENOUS at 08:40

## 2021-06-16 RX ADMIN — FUROSEMIDE 20 MG: 20 TABLET ORAL at 07:53

## 2021-06-16 RX ADMIN — NYSTATIN: 100000 POWDER TOPICAL at 04:55

## 2021-06-16 RX ADMIN — Medication 1000 UNITS: at 04:55

## 2021-06-16 RX ADMIN — TAMSULOSIN HYDROCHLORIDE 0.4 MG: 0.4 CAPSULE ORAL at 09:45

## 2021-06-16 RX ADMIN — CLONAZEPAM 0.5 MG: 0.5 TABLET ORAL at 07:49

## 2021-06-16 RX ADMIN — HYDROCORTISONE 5 MG: 5 TABLET ORAL at 04:55

## 2021-06-16 RX ADMIN — NYSTATIN: 100000 POWDER TOPICAL at 17:38

## 2021-06-16 RX ADMIN — HYDROCORTISONE 5 MG: 5 TABLET ORAL at 15:21

## 2021-06-16 ASSESSMENT — ENCOUNTER SYMPTOMS
FOCAL WEAKNESS: 0
BLURRED VISION: 0
SPEECH CHANGE: 0
HALLUCINATIONS: 0
DIARRHEA: 0
NAUSEA: 0
WEAKNESS: 1
ABDOMINAL PAIN: 0
DIZZINESS: 0
VOMITING: 0
SORE THROAT: 0
DIAPHORESIS: 0
BACK PAIN: 0
WHEEZING: 0
NERVOUS/ANXIOUS: 0
NECK PAIN: 0
FLANK PAIN: 0
CONSTIPATION: 0
CHILLS: 0
SENSORY CHANGE: 0
COUGH: 0
MYALGIAS: 0
DEPRESSION: 0
PALPITATIONS: 0
FEVER: 0
SHORTNESS OF BREATH: 0
HEADACHES: 0
HEARTBURN: 0

## 2021-06-16 ASSESSMENT — PAIN DESCRIPTION - PAIN TYPE
TYPE: SURGICAL PAIN
TYPE: SURGICAL PAIN
TYPE: ACUTE PAIN
TYPE: SURGICAL PAIN
TYPE: ACUTE PAIN
TYPE: SURGICAL PAIN
TYPE: SURGICAL PAIN
TYPE: ACUTE PAIN
TYPE: ACUTE PAIN

## 2021-06-16 NOTE — PROGRESS NOTES
Received report from previous shift RN  Assessment complete.  A&O x 4. Patient calls appropriately.  Patient ambulates with x2 maximum assist. Bed alarm on.   Patient has 8/10 pain. Pain managed with prescribed medications.  Denies N&V. Tolerating low fiber (GI soft) diet.  Surgical Midline incision with wound vac in place, CDI. No leaks noted   + void, + flatus, last BM 6/15.  Patient denies SOB.    Patient laying in bed. Review plan with of care with patient. Call light and personal belongings with in reach. Hourly rounding in place. All needs met at this time.

## 2021-06-16 NOTE — PROGRESS NOTES
[facility] Medicine Daily Progress Note    Date of Service  6/16/2021    Chief Complaint  56 y.o. male admitted 2/9/2021 with Syncope.    Hospital Course  56-year-old male with history of atrial fibrillation, diabetes, adrenal insufficiency, depression and obesity presented 2/9 with syncope and bradycardia with hypotension. Patient was on diltiazem, digoxin and metoprolol which all were held; once, bradycardia had improved and resumed metoprolol and digoxin with later switched to amiodarone by cardiologist, Eliquis was started for anticoagulation.    He also had a possible history of adrenal insufficiency, he was already on Florinef and midodrine.  Midodrine was discontinued and he was started on cortisol. Also florinef was discontinued since volume overload.     Further work-up showed that he had gram-negative bacteremia as well as urinary tract infection and acute kidney injury.  He was initially placed on IV Zosyn, later changed to oral Bactrim to finish the course.      On admission, he also expressed suicidal ideation.  Psychiatry was consulted the case, he was placed on a legal hold.  He was noted to have severe depression.  He has been placed on medication the form of Geodon, Paxil, Klonopin.  He has a colostomy in place, which the patient believes contributes to his severe depression because of ostomy care. Legal hold though discontinued because pt did improve significantly once ostomy was reversed.     He had history of bowel perforation and splenic fluid collection requiring Segmental colectomy, colostomy creation, mobilization of splenic flexure, wound VAC placement, irrigation and debridement of flank wound on 11/10/2020.  Surgery was consulted on the case, and he underwent exploratory laparotomy and colostomy takedown and closure on 5/27/2021. Pt was followed by wound care post op with wound vac interval changes.    Patient lives by himself and is not able to take care of himself at this time, PT and OT  evaluated the patient and recommended SNF    Interval Problem Update  6/15- he is feeling well overall. He had two bowel movement. No diarrhea. He is volume overload. Stop IVF. Continue to monitor. Consider start dose of lasix in the future. Still remain tachycardic. To consider to increase amiodarone ?? Or add metoprolol. Pt very sensitive to BP meds    6/16- pt doing well. No more diarrhea from yesterday. Hold on stool softer. BP improving. Start low dose lasix. Continue to monitor. More staples this week.     Consultants/Specialty  Cardiology  Psychiatry  Surgery    Code Status  Full Code    Disposition  SNF placement pending   Patient needs help for daily activities  Patient is medically cleared    Discussed with patient, patient's nurse and with multidisciplinary team during rounds including , pharmacist and charge nurse.      I have performed the physical examination, and reviewed updated ROS and plan today 6/14/2021.  In review of yesterday's note, there are no new changes except as documented above or bolded below.      Review of Systems  Review of Systems   Constitutional: Positive for malaise/fatigue. Negative for chills, diaphoresis and fever.   HENT: Negative for congestion, hearing loss and sore throat.    Eyes: Negative for blurred vision.   Respiratory: Negative for cough, shortness of breath and wheezing.    Cardiovascular: Negative for chest pain, palpitations and leg swelling.   Gastrointestinal: Negative for abdominal pain, constipation, diarrhea, heartburn, nausea and vomiting.   Genitourinary: Negative for dysuria, flank pain and hematuria.   Musculoskeletal: Negative for back pain, joint pain, myalgias and neck pain.   Skin: Negative for rash.   Neurological: Positive for weakness. Negative for dizziness, sensory change, speech change, focal weakness and headaches.   Psychiatric/Behavioral: Negative for depression, hallucinations and suicidal ideas. The patient is not  nervous/anxious.       Physical Exam  Temp:  [36.1 °C (97 °F)-36.6 °C (97.8 °F)] 36.6 °C (97.8 °F)  Pulse:  [] 95  Resp:  [16-17] 17  BP: (127-141)/(75-91) 141/86  SpO2:  [94 %-98 %] 94 %    Physical Exam  Vitals and nursing note reviewed.   Constitutional:       Appearance: He is obese.   HENT:      Head: Normocephalic and atraumatic.      Nose: No congestion.      Mouth/Throat:      Mouth: Mucous membranes are moist.   Eyes:      Extraocular Movements: Extraocular movements intact.      Conjunctiva/sclera: Conjunctivae normal.      Pupils: Pupils are equal, round, and reactive to light.   Cardiovascular:      Rate and Rhythm: Normal rate. Rhythm irregular.   Pulmonary:      Effort: Pulmonary effort is normal. No respiratory distress.      Breath sounds: No wheezing or rales.   Abdominal:      General: There is no distension.      Tenderness: There is no abdominal tenderness. There is no guarding or rebound.      Comments: Wound vac in place    Musculoskeletal:      Cervical back: No muscular tenderness.      Right lower leg: No edema.      Left lower leg: No edema.   Skin:     General: Skin is warm and dry.      Findings: No bruising.   Neurological:      General: No focal deficit present.      Mental Status: He is alert and oriented to person, place, and time.      Cranial Nerves: No cranial nerve deficit.      Motor: No weakness.   Psychiatric:         Mood and Affect: Mood is not anxious.         Speech: Speech normal.         Thought Content: Thought content is not paranoid or delusional. Thought content does not include homicidal or suicidal ideation. Thought content does not include homicidal or suicidal plan.         Judgment: Judgment is not impulsive.       Fluids    Intake/Output Summary (Last 24 hours) at 6/16/2021 1257  Last data filed at 6/16/2021 1130  Gross per 24 hour   Intake 480 ml   Output 1300 ml   Net -820 ml       Laboratory  Recent Labs     06/15/21  1345   WBC 10.4   RBC 3.88*    HEMOGLOBIN 11.2*   HEMATOCRIT 35.3*   MCV 91.0   MCH 28.9   MCHC 31.7*   RDW 53.5*   PLATELETCT 301   MPV 10.5     Recent Labs     06/15/21  1345   SODIUM 140   POTASSIUM 3.6   CHLORIDE 110   CO2 22   GLUCOSE 108*   BUN 14   CREATININE 1.68*   CALCIUM 8.1*                   Imaging  US-RENAL   Final Result      1.  Low-level echogenic debris in the urinary bladder is noted which could indicate UTI or blood within the bladder.      2.  No hydronephrosis.      IR-MIDLINE CATHETER INSERTION WO GUIDANCE > AGE 3   Final Result                  Ultrasound-guided midline placement performed by qualified nursing staff    as above.          EC-ECHOCARDIOGRAM COMPLETE W/ CONT   Final Result      DX-CHEST-PORTABLE (1 VIEW)   Final Result      1.  Minor asymmetric interstitial opacity in the right lower lobe which could represent minor interstitial edema, pneumonia, or fibrotic change.      2.  No left lung consolidation.      3.  No evidence of congestive failure.      BS-[id number]-9 VIEW   Final Result         No specific finding to suggest small bowel obstruction.      AU-[id number]-2 VIEW   Final Result         No significant interval change.      DX-CHEST-LIMITED (1 VIEW)   Final Result         Diffuse interstitial prominence could relate to mild pulmonary edema or atypical infection      [id number] VIEW   Final Result      Increased colonic gas without definite bowel obstruction.      US-RENAL   Final Result      No evidence of hydronephrosis.      Lobulated kidneys bilaterally.      CT-ABDOMEN-PELVIS WITH   Final Result      1.  There is no evidence of small bowel obstruction.   2.  There is a left lower quadrant ostomy.   3.  There is fluid distention of the colon distal to the surgical material in the left mid descending colon extending all the way to the rectum. There is no pneumatosis or free air.   4.  There is cholelithiasis without biliary dilatation.   5.  There has been interval removal of the drainage catheter  anterior to the spleen with minimal hypodense area in the anterior spleen again noted. There is no new fluid collection.      IR-US GUIDED PIV   Final Result    Ultrasound-guided PERIPHERAL IV INSERTION performed by    qualified nursing staff as above.      EC-ECHOCARDIOGRAM COMPLETE W/O CONT   Final Result      DX-LUMBAR SPINE-2 OR 3 VIEWS   Final Result      Moderate compression deformity of T11 is new compared to 2018.      Mild wedge deformity of T12 is unchanged.      Degenerative changes including facet arthropathy.      Mild retrolisthesis of L5 on S1 and L3 on L4.              Assessment/Plan  * Debility- (present on admission)  Assessment & Plan  PT and OT; SNF  vit d level low 5/29- start replacement   Patient has generalized weakness  Poor prognosis    Leukocytosis- (present on admission)  Assessment & Plan  Around 12-13  No fever or chills  Continue his medications  Consider repeat CT scan for abdomen if needed  6/16- improving     Obstructive uropathy- (present on admission)  Assessment & Plan  Bladder scan and straight cath prn    Permanent atrial fibrillation (HCC)- (present on admission)  Assessment & Plan  6/7- a-fib persistent. Cardiology consulted again to start amiodarone   Stop digoxin high risk toxicity with SRUTHI   Stop metoprolol since hypotensive   Continue eliquis   6/15- still sometimes RVR. Continue to monitor.   6/16- somehow improving. Continue same treatment     Chronic venous insufficiency of lower extremity- (present on admission)  Assessment & Plan  compression stockings.    Chronic heart failure with preserved ejection fraction (HCC)- (present on admission)  Assessment & Plan  Toprol on hold due to low BP and orthostatic hypo  C/w Atorvastatin  Monitor fluid status and Cr    Type 2 diabetes mellitus with hyperglycemia, with long-term current use of insulin (HCC)- (present on admission)  Assessment & Plan  Follow bmp  Currently not requiring Insulin    Major depressive disorder,  recurrent, severe with psychotic features (HCC)- (present on admission)  Assessment & Plan  Geodon, Paxil, Klonopin  Psychiatry following     Colostomy present (HCC)- (present on admission)  Assessment & Plan  Exploratory laparotomy with colostomy takedown and closure 5/27/2021  Segmental colectomy, colostomy creation, mobilization of splenic flexure, wound VAC placement, irrigation and debridement of flank wound on 11/10/2020.  Improving on his leukocytosis  No fever or chills and wound VAC in place   6/15- off antibiotic   working for SNF  6/16- plan for more staples and wash out after three days         Hypothyroidism- (present on admission)  Assessment & Plan  Increased Levothyroxine to 125 mcg  Check TSH on 6/29/2021    SRUTHI (acute kidney injury) (HCC)- (present on admission)  Assessment & Plan  6/1- resolved   FeNa showed prerenal  6/15- stable. Stop IVF. Continue to monitor. Consider to add lasix later on if BP stable   6/16- stable. Continue to monitor. Start lasix.     Adrenal insufficiency (HCC)- (present on admission)  Assessment & Plan  Likely  Redcued corticef 5 mg BID 6/13  And then corticef 5 mg daily on 7/4  He need endo evaluation as outpatient    Suicidal ideation- (present on admission)  Assessment & Plan  6/4- legal hold discontinued.   Possible SNF placement     Orthostatic hypotension- (present on admission)  Assessment & Plan  Stop florinef. Consider midodrine once heart rate improving   Florinef increase risk of water retention, poor wound healing and he is already on hydrocortisone   Stop metoprolol or other BP meds  6/16- improving, he is off midodrine/florinef. Start low dose of lasix. Continue to monitor       Mixed hyperlipidemia- (present on admission)  Assessment & Plan  Atorvastatin.     Lower limb amputation, great toe (HCC)- (present on admission)  Assessment & Plan  Monitor  No signs of infection     Hypomagnesemia- (present on admission)  Assessment & Plan  Monitor and replace as  needed     Class 2 severe obesity with serious comorbidity and body mass index (BMI) of 35.0 to 35.9 in adult (HCC)- (present on admission)  Assessment & Plan  Body mass index is 35.19 kg/m².    Hypokalemia- (present on admission)  Assessment & Plan  Monitor and replete as needed       VTE prophylaxis: eliquis       Interventions to be considered in all patients in order to minimize the risk of delirium.   -do not disturb patient (vitals or lab draws) between the hours of 10 PM and 6 AM.  -ideally the patient should not sleep during the day and we should avoid day time naps.   -up in chair for meals  -ambulate at least three times daily, as able  -watch for constipation  -timed voiding - ask patient is she would like to go to the bathroom q 2-3 hours, except during the do not disturb hours.   -remove all necessary lines (central lines, peripheral IVs, feeding tubes, frye catheters)  -unless patient has shown harm to self or others I would recommend against use of restraints - either chemical or physical (antipsychotics)   -minimize polypharmacy, do not dose medication during sleep hours

## 2021-06-16 NOTE — WOUND TEAM
[facility]  Inpatient Services  Wound and Skin Care Progress Note    Admission Date: 2/9/2021     Last order of IP CONSULT TO WOUND CARE was found on 3/18/2021 from Hospital Encounter on 2/9/2021     HPI, PMH, SH: Reviewed    Past Surgical History:   Procedure Laterality Date   • PB EXPLORATORY OF ABDOMEN  5/27/2021    Procedure: LAPAROTOMY, EXPLORATORY;  Surgeon: Kin Desouza D.O.;  Location: [facility];  Service: General   • COLOSTOMY TAKEDOWN  5/27/2021    Procedure: COLOSTOMY TAKEDOWN AND CLOSURE;  Surgeon: Kin Desouza D.O.;  Location: SURGERY [facility];  Service: General   • COLECTOMY N/A 11/10/2020    Procedure: COLECTOMY-SEGMENTAL, COLOSTOMY, MOBILIZATION OF SPLENIC FLEXURE, WOUND VAC PLACEMENT, IRRIGATION AND DEBRIDMENT FLANK WOUND;  Surgeon: Kin Desouza D.O.;  Location: [facility];  Service: General   • ZZZ CARDIAC CATH  07/21/2018    EF 45%, normal coronaries.   • IRRIGATION & DEBRIDEMENT ORTHO Right 2/19/2018    Procedure: IRRIGATION & DEBRIDEMENT ORTHO-FOOT;  Surgeon: Kirby Lopez M.D.;  Location: [facility];  Service: Orthopedics   • TOE AMPUTATION Right 1/17/2018    Procedure: TOE AMPUTATION-1ST RAY;  Surgeon: Doug Delong M.D.;  Location: [facility];  Service: Orthopedics   • TOE AMPUTATION Right 11/10/2017    Procedure: TOE AMPUTATION;  Surgeon: Osorio Leo M.D.;  Location: [facility];  Service: Orthopedics     Social History     Tobacco Use   • Smoking status: Never Smoker   • Smokeless tobacco: Never Used   Substance Use Topics   • Alcohol use: No     Chief Complaint   Patient presents with   • Syncope     X1 this morning when standing up to go to bathroom     Diagnosis: Syncope and collapse [R55]  Suicidal ideation [R45.851]  Sepsis due to Klebsiella pneumoniae (HCC) [A41.4]    Unit where seen by Wound Team: T418/00     WOUND CONSULT/FOLLOW UP RELATED TO: abdominal wound    WOUND HISTORY:  Pt is an older  gentleman well known to [facility] wound team 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 Pt currently admitted since 2/9/21 related to SI concerns due to body image issues related to colostomy.      *Pt underwent surgical reversal of colostomy with Dr. Desouza on 5/20/21. MLI was re-opened at that time and was then closed with interrupted staples and packing. Unfortunately MLI continued to Dehisce and therefore wound team was consulted to further manage MLI with dakins packing.     6/3 Wound vac placed    WOUND ASSESSMENT/LDA       Negative Pressure Wound Therapy 06/03/21 Abdomen (Active)   Vacuum Serial Number [id number] 06/14/21 0900   NPWT Pump Mode / Pressure Setting Ulta    Dressing Type Gray Foam (Cleanse Choice);Small;Black Foam (Veraflo)    Number of Foam Pieces Used 3    Canister Changed No    Output (mL) 100 mL    NEXT Dressing Change/Treatment Date 06/18/21    VAC VeraFlo Irrigant Normal Saline    VAC VeraFlo Soak Time (mins) 10    VAC VeraFlo Instill Volume (ml) 24    VAC VeraFlo - Therapy Time (hrs) 2.5    VAC VeraFlo Pressure (mm/Hg) Intermittent;125 mmHg    WOUND NURSE ONLY - Time Spent with Patient (mins) 75      Wound 05/27/21 Full Thickness Wound Abdomen (Active)   Wound Image     06/16/21 1200   Site Assessment Yellow;Red    Periwound Assessment Clean;Dry;Intact;Scar tissue    Margins Attached edges;Defined edges    Closure Secondary intention;Adhesive bandage    Drainage Amount Scant    Drainage Description Serosanguineous    Treatments Cleansed;Site care    Wound Cleansing Approved Wound Cleanser    Periwound Protectant Skin Protectant Wipes to Periwound;Paste Ring;Drape    Dressing Cleansing/Solutions Normal Saline    Dressing Options Wound Vac    Dressing Changed Changed    Dressing Status Clean;Dry;Intact    Dressing Change/Treatment Frequency Monday, Wednesday, Friday, and As Needed    NEXT Dressing Change/Treatment Date 06/18/21    NEXT Weekly Photo (Inpatient Only) 06/23/21     Non-staged Wound Description Full thickness    Wound Length (cm) 17 cm    Wound Width (cm) 9.4 cm    Wound Depth (cm) 2.5 cm    Wound Surface Area (cm^2) 159.8 cm^2    Wound Volume (cm^3) 399.5 cm^3    Wound Healing % -388    Shape Linear    Wound Odor None    Pulses N/A    Exposed Structures None    WOUND NURSE ONLY - Time Spent with Patient (mins) 60      Vascular:    SHAYAN:   No results found.    Lab Values:    Lab Results   Component Value Date/Time    WBC 10.4 06/15/2021 01:45 PM    RBC 3.88 (L) 06/15/2021 01:45 PM    HEMOGLOBIN 11.2 (L) 06/15/2021 01:45 PM    HEMATOCRIT 35.3 (L) 06/15/2021 01:45 PM    CREACTPROT 0.48 06/10/2021 04:05 AM    SEDRATEWES 13 10/23/2018 08:02 AM    HBA1C 5.2 2021 01:30 AM      Culture Results show:  No results found for this or any previous visit (from the past 720 hour(s)).    Pain Level/Medicated:  IV Morphine immediately prior to dressing change      INTERVENTIONS BY WOUND TEAM:  Chart and images reviewed. Discussed with bedside RN. All areas of concern (based on picture review, LDA review and discussion with bedside RN) have been thoroughly assessed. Documentation of areas based on significant findings. This RN in to assess patient. Performed standard wound care which includes appropriate positioning, dressing removal and non-selective debridement. Pictures and measurements obtained weekly if/when required.              ABD:  Preparation for Dressing removal:  Soaked with NS from NPWT machine  Cleansed with: Wound cleanser and gauze   Sharp debridement: scant debridement with forceps and scissors, no bleeding noted.   Joya wound: Cleansed with Wound cleanser and gauze, No sting skin and paste ring x 3, mepitel one to distal and proximal staples. Hydrocolloid thin to deroofed bullae  Primary Dressin piece of grey waffle layer to fascial line sutures and base of wound bed, remaining wound bed filled with complete small VF black foam, edges pulled together and sealed  "with drape.   Secondary (Outer) Dressing:  Sealed with drape, TRAC pad applied.     Interdisciplinary consultation: Patient, Bedside RN (Rashel), Wound RN (Delvis), Gen Surgery (Dr. Desouza)    EVALUATION / RATIONALE FOR TREATMENT:  Most Recent Date:  6/16/21: Wound with decreased slough. Wound appears to be \"flattening out.\" Veraflo is assisting with cleansing wound but is making pulling edges together difficult. Discussed with Dr. Desouza. Will plan to Meet with Dr. Desouza on Friday at 8am to assess wound, Dr. Desouza to pull superior and inferior borders together and staple to assist with closure.    6/14/21: Wound continues to granulate. Small amount of slough over fascial line. Discussed with Dr. Desouza. Plan to continue with veraflo, settings decreased. Will evaluate the wound with Dr. Desouza on Wednesday at 9am. Per MD may consider OR washout with staples to distal proximal aspect to assist in wound closure vs. Adding staples at bedside.   06/11/21:  Wound looks good, minimal slough mainly to fascial line sutures.  VF grey foam layer to assist in debridement of slough.  Dr. Desouza to re-assess Monday and do surgical debridement if needed next week.   06/09/21:  Wound continues to progress, pull edges together to lessen width of wound. Dr. Desouza would like to see wound on Friday 6/7: Pt's wound appears to be improving per measurements and pictures. There is granulation tissue present.   06/5/21: Wound bed improving with increased granulation tissue.  Wound edges approximated prior to drape application.  NPWT continued.    06/03/21:  Fascial sutures intact, wound VAC placed with DR. Desouza.    6/1/21: Wound appears  than yesterdays pictures. Discussed with Dr. Desouza, plan to continue dakins TID for 48hrs, CT scan ordered today. Will re-evaluate on Thursday for potential vac placement.   5/31/21: Wound initially with interrupted staples and packing now with dehiscence. Per Dr. Baeza request wound " team initiated dakins packing. Pt may benefit from NPWT to assist with closure.   5/8/21: wound nearly healed. Does not require advanced wound care. Wound team signing off and nursing to re consult if site worsens. Continue with dressing care per order.  4/21/21: abd wound improving. Nursing to continue dsg changes per order.  4/15: Right posterior thigh DTI now resolved. Pink and blanching. Abdominal wound to surface level continuing with dressing POC.  4/7/21: Thigh wound nearly healed and will likely be resolved at next assessment. Abdomen wound appears larger. Continued with Arti to move through inflammatory phase. Hydrofera blue for its bacteriostatic properties and to assist in drainage management.   03/31/21: thigh wound healing well now to a stage 2.  Continue current plan.  All interventions in place.   ABD more denuded and bloody.  Arti to assist in moving wound through inflammatory phase.   3/23/21: Right posterior thigh wound remains a DTI but appears to be evolving into a stage 2. Wound team was previously consulted regarding sacral wound which appeared to be a skin tear however skin now appears to be intact however discolored. Does not appear to be pressure related as pt is on a waffle, self mobile and sacral mepilex is in use. Area is also not a normal pressure injury shape. Wound team will continue to follow.   3/18/21: Pt with linear DTIs to R posterior thigh from lying on top of frye cath tubing. Offloading precautions ordered. Wound team to continue following.     Goals: Steady decrease in wound area and depth weekly.    WOUND TEAM PLAN OF CARE ([X] for frequency of wound follow up,):   Nursing to follow orders written for wound care. Contact wound team if area fails to progress, deteriorates or with any questions/concerns  Dressing changes by wound team:                   Follow up 3 times weekly:                NPWT change 3 times weekly:   X  Follow up 1-2 times weekly:     Follow up  Bi-Monthly:                   Follow up as needed:   Other (explain):     NURSING PLAN OF CARE ORDERS (X):  Dressing changes: See Dressing Care orders: X  Skin care: See Skin Care orders: x  RN Prevention Protocol: x  Rectal tube care: See Rectal Tube Care orders:   Other orders:        Anticipated discharge plans:   LTACH:        SNF/Rehab:                  Home Health Care:           Outpatient Wound Center:            Self/Family Care:        Other:   Group Home

## 2021-06-16 NOTE — CARE PLAN
The patient is Stable - Low risk of patient condition declining or worsening    Shift Goals  Clinical Goals: safety, skin integrity  Patient Goals: pain control  Family Goals: n/a        Problem: Bowel Elimination  Goal: Establish and maintain regular bowel function  Outcome: Progressing    Pt currently having frequent, liquid stools. Pericare provided prn to maintain skin integrity.     Problem: Mobility  Goal: Patient's capacity to carry out activities will improve  Outcome: Progressing   Pt repositioned q2hr and as needed, frequent position changes with pericare, sitting upright for meals.

## 2021-06-16 NOTE — DISCHARGE PLANNING
Anticipated Discharge Disposition: SNF    Action: RN BRANDON spoke with patient at bedside to discuss SNF choice due to declination for current SNF choices. Patient would like to send out referrals to more facilities. SNF choice received for: 1 [facility], 2 [facility], 3 [facility], 4 [facility], 5 [facility]. Choice form faxed to DPA.     Barriers to Discharge: medical clearance, SNF acceptance    Plan: Follow up with medical team and DPA.

## 2021-06-16 NOTE — CARE PLAN
Problem: Mobility  Goal: Patient's capacity to carry out activities will improve  Outcome: Not Progressing  Pt requiring x2 maximum assist. Educated on importance of ambulation and active range of motion.   Problem: Knowledge Deficit - Standard  Goal: Patient and family/care givers will demonstrate understanding of plan of care, disease process/condition, diagnostic tests and medications  Outcome: Progressing     Problem: Bowel Elimination  Goal: Establish and maintain regular bowel function  Outcome: Progressing     The patient is Stable - Low risk of patient condition declining or worsening    Shift Goals  Clinical Goals: skin integrity   Patient Goals: pain control   Family Goals: n/a    Progress made toward(s) clinical / shift goals: Q2 turns, inter dry, barrier paste/wipes in place.     Patient is not progressing towards the following goals:

## 2021-06-16 NOTE — PROGRESS NOTES
Bedside report received. Assessment completed.  Pt is A&O x4. Pt on room air.   Medicating for pain PRN per MAR Pain 7/10  Denies nausea.  BLE numbness/tingling.  Skin MLI w/vac, Old great toe amputation, Interdry in pannus   LDA Midline RUE   Last BM 6/16/21 . +flatus,   +void.  Low fiber GI soft diet. Tolerates well.   Pt max assist to EOB.  Call light and belongings within reach. All needs met at this time. Fall Precautions and hourly rounding in place.

## 2021-06-16 NOTE — DISCHARGE PLANNING
Received Choice form at [phone]  Agency/Facility Name: Local [facility] [facility]Fs  Referral sent per Choice form @ [phone]

## 2021-06-16 NOTE — CARE PLAN
The patient is Stable - Low risk of patient condition declining or worsening    Shift Goals  Clinical Goals: pain control, skin integrity      Progress made toward(s) clinical / shift goals:  [id number], pillows in use, medicating PRN per MAR    Patient is not progressing towards the following goals:

## 2021-06-17 LAB
ALBUMIN SERPL BCP-MCNC: 2.1 G/DL (ref 3.2–4.9)
ALBUMIN/GLOB SERPL: 0.7 G/DL
ALP SERPL-CCNC: 77 U/L (ref 30–99)
ALT SERPL-CCNC: 8 U/L (ref 2–50)
ANION GAP SERPL CALC-SCNC: 9 MMOL/L (ref 7–16)
AST SERPL-CCNC: 19 U/L (ref 12–45)
BASOPHILS # BLD AUTO: 0.8 % (ref 0–1.8)
BASOPHILS # BLD: 0.07 K/UL (ref 0–0.12)
BILIRUB SERPL-MCNC: 0.3 MG/DL (ref 0.1–1.5)
BUN SERPL-MCNC: 18 MG/DL (ref 8–22)
CALCIUM SERPL-MCNC: 8.3 MG/DL (ref 8.5–10.5)
CHLORIDE SERPL-SCNC: 107 MMOL/L (ref 96–112)
CO2 SERPL-SCNC: 21 MMOL/L (ref 20–33)
CREAT SERPL-MCNC: 1.5 MG/DL (ref 0.5–1.4)
EOSINOPHIL # BLD AUTO: 0.54 K/UL (ref 0–0.51)
EOSINOPHIL NFR BLD: 5.9 % (ref 0–6.9)
ERYTHROCYTE [DISTWIDTH] IN BLOOD BY AUTOMATED COUNT: 52 FL (ref 35.9–50)
GLOBULIN SER CALC-MCNC: 3.2 G/DL (ref 1.9–3.5)
GLUCOSE SERPL-MCNC: 110 MG/DL (ref 65–99)
HCT VFR BLD AUTO: 33 % (ref 42–52)
HGB BLD-MCNC: 10.4 G/DL (ref 14–18)
IMM GRANULOCYTES # BLD AUTO: 0.04 K/UL (ref 0–0.11)
IMM GRANULOCYTES NFR BLD AUTO: 0.4 % (ref 0–0.9)
LYMPHOCYTES # BLD AUTO: 2.38 K/UL (ref 1–4.8)
LYMPHOCYTES NFR BLD: 26 % (ref 22–41)
MCH RBC QN AUTO: 28.1 PG (ref 27–33)
MCHC RBC AUTO-ENTMCNC: 31.5 G/DL (ref 33.7–35.3)
MCV RBC AUTO: 89.2 FL (ref 81.4–97.8)
MONOCYTES # BLD AUTO: 0.84 K/UL (ref 0–0.85)
MONOCYTES NFR BLD AUTO: 9.2 % (ref 0–13.4)
NEUTROPHILS # BLD AUTO: 5.29 K/UL (ref 1.82–7.42)
NEUTROPHILS NFR BLD: 57.7 % (ref 44–72)
NRBC # BLD AUTO: 0 K/UL
NRBC BLD-RTO: 0 /100 WBC
PLATELET # BLD AUTO: 316 K/UL (ref 164–446)
PMV BLD AUTO: 11.1 FL (ref 9–12.9)
POTASSIUM SERPL-SCNC: 3.1 MMOL/L (ref 3.6–5.5)
PROT SERPL-MCNC: 5.3 G/DL (ref 6–8.2)
RBC # BLD AUTO: 3.7 M/UL (ref 4.7–6.1)
SODIUM SERPL-SCNC: 137 MMOL/L (ref 135–145)
WBC # BLD AUTO: 9.2 K/UL (ref 4.8–10.8)

## 2021-06-17 PROCEDURE — A9270 NON-COVERED ITEM OR SERVICE: HCPCS | Performed by: INTERNAL MEDICINE

## 2021-06-17 PROCEDURE — 700102 HCHG RX REV CODE 250 W/ 637 OVERRIDE(OP): Performed by: INTERNAL MEDICINE

## 2021-06-17 PROCEDURE — 97535 SELF CARE MNGMENT TRAINING: CPT

## 2021-06-17 PROCEDURE — 80053 COMPREHEN METABOLIC PANEL: CPT

## 2021-06-17 PROCEDURE — 700111 HCHG RX REV CODE 636 W/ 250 OVERRIDE (IP): Performed by: INTERNAL MEDICINE

## 2021-06-17 PROCEDURE — 99231 SBSQ HOSP IP/OBS SF/LOW 25: CPT | Performed by: INTERNAL MEDICINE

## 2021-06-17 PROCEDURE — 770001 HCHG ROOM/CARE - MED/SURG/GYN PRIV*

## 2021-06-17 PROCEDURE — 85025 COMPLETE CBC W/AUTO DIFF WBC: CPT

## 2021-06-17 RX ORDER — POTASSIUM CHLORIDE 20 MEQ/1
40 TABLET, EXTENDED RELEASE ORAL ONCE
Status: COMPLETED | OUTPATIENT
Start: 2021-06-17 | End: 2021-06-17

## 2021-06-17 RX ORDER — MAGNESIUM SULFATE HEPTAHYDRATE 40 MG/ML
2 INJECTION, SOLUTION INTRAVENOUS ONCE
Status: COMPLETED | OUTPATIENT
Start: 2021-06-17 | End: 2021-06-17

## 2021-06-17 RX ADMIN — CLONAZEPAM 0.5 MG: 0.5 TABLET ORAL at 08:15

## 2021-06-17 RX ADMIN — NYSTATIN: 100000 POWDER TOPICAL at 17:16

## 2021-06-17 RX ADMIN — HYDROCORTISONE 5 MG: 5 TABLET ORAL at 14:56

## 2021-06-17 RX ADMIN — PAROXETINE HYDROCHLORIDE 20 MG: 20 TABLET, FILM COATED ORAL at 08:15

## 2021-06-17 RX ADMIN — Medication 1000 UNITS: at 04:29

## 2021-06-17 RX ADMIN — AMIODARONE HYDROCHLORIDE 200 MG: 200 TABLET ORAL at 04:28

## 2021-06-17 RX ADMIN — FUROSEMIDE 20 MG: 20 TABLET ORAL at 04:29

## 2021-06-17 RX ADMIN — ATORVASTATIN CALCIUM 40 MG: 40 TABLET, FILM COATED ORAL at 17:15

## 2021-06-17 RX ADMIN — APIXABAN 5 MG: 5 TABLET, FILM COATED ORAL at 04:29

## 2021-06-17 RX ADMIN — APIXABAN 5 MG: 5 TABLET, FILM COATED ORAL at 17:15

## 2021-06-17 RX ADMIN — HYDROCORTISONE 5 MG: 5 TABLET ORAL at 04:29

## 2021-06-17 RX ADMIN — ZIPRASIDONE HYDROCHLORIDE 60 MG: 60 CAPSULE ORAL at 22:30

## 2021-06-17 RX ADMIN — AMIODARONE HYDROCHLORIDE 200 MG: 200 TABLET ORAL at 17:15

## 2021-06-17 RX ADMIN — MAGNESIUM SULFATE 2 G: 2 INJECTION INTRAVENOUS at 08:13

## 2021-06-17 RX ADMIN — LEVOTHYROXINE SODIUM 125 MCG: 0.12 TABLET ORAL at 04:29

## 2021-06-17 RX ADMIN — POTASSIUM CHLORIDE 40 MEQ: 1500 TABLET, EXTENDED RELEASE ORAL at 08:15

## 2021-06-17 RX ADMIN — ZIPRASIDONE HYDROCHLORIDE 60 MG: 60 CAPSULE ORAL at 09:47

## 2021-06-17 RX ADMIN — NYSTATIN: 100000 POWDER TOPICAL at 04:29

## 2021-06-17 RX ADMIN — OXYCODONE 10 MG: 5 TABLET ORAL at 22:24

## 2021-06-17 RX ADMIN — CLONAZEPAM 1 MG: 1 TABLET ORAL at 17:15

## 2021-06-17 RX ADMIN — TAMSULOSIN HYDROCHLORIDE 0.4 MG: 0.4 CAPSULE ORAL at 09:47

## 2021-06-17 RX ADMIN — OXYCODONE 10 MG: 5 TABLET ORAL at 16:24

## 2021-06-17 ASSESSMENT — ENCOUNTER SYMPTOMS
SHORTNESS OF BREATH: 0
DIZZINESS: 0
HALLUCINATIONS: 0
VOMITING: 0
DEPRESSION: 0
NECK PAIN: 0
CHILLS: 0
SPEECH CHANGE: 0
FLANK PAIN: 0
WHEEZING: 0
SENSORY CHANGE: 0
DIARRHEA: 0
DIAPHORESIS: 0
HEARTBURN: 0
BLURRED VISION: 0
PALPITATIONS: 0
MYALGIAS: 0
FOCAL WEAKNESS: 0
FEVER: 0
CONSTIPATION: 0
NAUSEA: 0
SORE THROAT: 0
HEADACHES: 0
WEAKNESS: 1
NERVOUS/ANXIOUS: 0
COUGH: 0
ABDOMINAL PAIN: 0
BACK PAIN: 0

## 2021-06-17 ASSESSMENT — PAIN DESCRIPTION - PAIN TYPE
TYPE: ACUTE PAIN;SURGICAL PAIN
TYPE: SURGICAL PAIN
TYPE: ACUTE PAIN
TYPE: ACUTE PAIN

## 2021-06-17 ASSESSMENT — COGNITIVE AND FUNCTIONAL STATUS - GENERAL
HELP NEEDED FOR BATHING: A LOT
DRESSING REGULAR LOWER BODY CLOTHING: A LOT
DAILY ACTIVITIY SCORE: 15
SUGGESTED CMS G CODE MODIFIER DAILY ACTIVITY: CK
PERSONAL GROOMING: A LITTLE
DRESSING REGULAR UPPER BODY CLOTHING: A LITTLE
TOILETING: TOTAL

## 2021-06-17 NOTE — THERAPY
"Occupational Therapy  Daily Treatment     Patient Name: Sebastián Castro  Age:  56 y.o., Sex:  male  Medical Record #: [id number]  Today's Date: 6/17/2021     Precautions  Precautions: (P) Fall Risk  Comments: (P) abdominal wound vac    Assessment    Pt seen for OT tx session. Pt eager and motivated to participate in session. Appeared more animated today laughing and smiling. Pt performed sit<>stands 5x w/ max A in preparation for ADL txfs and standing ADLs. Pt performed seated shave w/ min A. Pt incont of stool, required total A for clean up. Pt demo'd impaired balance, functional mobility, activity tolerance and generalized weakness impacting functional independence. Will continue to follow.     Plan    Continue current treatment plan.    DC Equipment Recommendations: (P) Unable to determine at this time  Discharge Recommendations: (P) Recommend post-acute placement for additional occupational therapy services prior to discharge home    Subjective    \"I don't want to look like a cowboy\"  \"You wont give up on me, right?\"     Objective       06/17/21 1626   Total Time Spent   Total Time Spent (Mins) 60   Treatment Charges   Charges Yes   OT Self Care / ADL 4   Precautions   Precautions Fall Risk   Comments abdominal wound vac   Pain 0 - 10 Group   Therapist Pain Assessment Post Activity Pain Same as Prior to Activity;Nurse Notified  (no c/o pain during session)   Cognition    Cognition / Consciousness X   Level of Consciousness Alert   New Learning Impaired   Attention Impaired   Comments pleasent, motivated, cooperative   Active ROM Upper Body   Active ROM Upper Body  WDL   Other Treatments   Other Treatments Provided Linen change   Balance   Sitting Balance (Static) Fair   Sitting Balance (Dynamic) Fair -   Standing Balance (Static) Poor   Standing Balance (Dynamic) Poor -   Weight Shift Sitting Fair   Weight Shift Standing Poor   Skilled Intervention Tactile Cuing;Verbal Cuing;Postural Facilitation;Facilitation "   Comments w/ 4WW   Bed Mobility    Supine to Sit Moderate Assist   Sit to Supine Moderate Assist   Scooting Moderate Assist   Rolling Moderate Assist to Rt.;Moderate Assist to Lt.   Skilled Intervention Tactile Cuing;Verbal Cuing;Facilitation   Activities of Daily Living   Grooming Minimal Assist;Seated  (full shave)   Upper Body Dressing Minimal Assist   Lower Body Dressing Total Assist  (socks)   Toileting Total Assist  (incont of BM)   Skilled Intervention Tactile Cuing;Verbal Cuing;Facilitation   How much help from another person does the patient currently need...   Putting on and taking off regular lower body clothing? 2   Bathing (including washing, rinsing, and drying)? 2   Toileting, which includes using a toilet, bedpan, or urinal? 1   Putting on and taking off regular upper body clothing? 3   Taking care of personal grooming such as brushing teeth? 3   Eating meals? 4   6 Clicks Daily Activity Score 15   Functional Mobility   Sit to Stand Maximal Assist   Mobility STS 5x, marched at EOB    Skilled Intervention Tactile Cuing;Verbal Cuing;Facilitation   Activity Tolerance   Sitting Edge of Bed 30   Standing 5 min total   Patient / Family Goals   Patient / Family Goal #1 To feel better   Goal #1 Outcome Goal not met   Short Term Goals   Short Term Goal # 1 Pt will perform LB dressing w/ min A   Goal Outcome # 1 Progressing slower than expected   Short Term Goal # 2 Pt will perform functional t/f's with supervision   Goal Outcome # 2 Progressing slower than expected   Short Term Goal # 3 Pt will perform h/g standing sink side with supervision   Goal Outcome # 3 Progressing slower than expected   Education Group   Role of Occupational Therapist Patient Response Patient;Acceptance;Explanation;Demonstration;Verbal Demonstration;Reinforcement Needed   Transfers Patient Response Patient;Acceptance;Explanation;Demonstration;Verbal Demonstration;Reinforcement Needed   ADL Patient Response  Patient;Acceptance;Explanation;Demonstration;Verbal Demonstration;Reinforcement Needed   Anticipated Discharge Equipment and Recommendations   DC Equipment Recommendations Unable to determine at this time   Discharge Recommendations Recommend post-acute placement for additional occupational therapy services prior to discharge home   Interdisciplinary Plan of Care Collaboration   IDT Collaboration with  Nursing   Patient Position at End of Therapy In Bed;Call Light within Reach;Tray Table within Reach;Phone within Reach;Bed Alarm On   Collaboration Comments report given   Session Information   Date / Session Number  6/17, 5 (1/3, 6/23)   Priority 2

## 2021-06-17 NOTE — PROGRESS NOTES
Bedside report received. Assessment completed.  Pt is A&O x4. Pt on room air.   Medicating for pain PRN per MAR Pain 7/10  Denies nausea.  BLE numbness/tingling.  Skin MLI w/vac, Old great toe amputation, Interdry in pannus            LDA Midline RUE   Last BM 6/17/21 . +flatus,   +void.  Low fiber GI soft diet. Tolerates well.   Pt max assist to EOB.  Call light and belongings within reach. All needs met at this time. Fall Precautions and hourly rounding in place.

## 2021-06-17 NOTE — PROGRESS NOTES
[facility] Medicine Daily Progress Note    Date of Service  6/17/2021    Chief Complaint  56 y.o. male admitted 2/9/2021 with Syncope.    Hospital Course  56-year-old male with history of atrial fibrillation, diabetes, adrenal insufficiency, depression and obesity presented 2/9 with syncope and bradycardia with hypotension. Patient was on diltiazem, digoxin and metoprolol which all were held; once, bradycardia had improved and resumed metoprolol and digoxin with later switched to amiodarone by cardiologist, Eliquis was started for anticoagulation.    He also had a possible history of adrenal insufficiency, he was already on Florinef and midodrine.  Midodrine was discontinued and he was started on cortisol. Also florinef was discontinued since volume overload.     Further work-up showed that he had gram-negative bacteremia as well as urinary tract infection and acute kidney injury.  He was initially placed on IV Zosyn, later changed to oral Bactrim to finish the course.      On admission, he also expressed suicidal ideation.  Psychiatry was consulted the case, he was placed on a legal hold.  He was noted to have severe depression.  He has been placed on medication the form of Geodon, Paxil, Klonopin.  He has a colostomy in place, which the patient believes contributes to his severe depression because of ostomy care. Legal hold though discontinued because pt did improve significantly once ostomy was reversed.     He had history of bowel perforation and splenic fluid collection requiring Segmental colectomy, colostomy creation, mobilization of splenic flexure, wound VAC placement, irrigation and debridement of flank wound on 11/10/2020.  Surgery was consulted on the case, and he underwent exploratory laparotomy and colostomy takedown and closure on 5/27/2021. Pt was followed by wound care post op with wound vac interval changes.    Patient lives by himself and is not able to take care of himself at this time, PT and OT  evaluated the patient and recommended SNF    Interval Problem Update  6/15- he is feeling well overall. He had two bowel movement. No diarrhea. He is volume overload. Stop IVF. Continue to monitor. Consider start dose of lasix in the future. Still remain tachycardic. To consider to increase amiodarone ?? Or add metoprolol. Pt very sensitive to BP meds    6/16- pt doing well. No more diarrhea from yesterday. Hold on stool softer. BP improving. Start low dose lasix. Continue to monitor. More staples this week.     6/17- doing very well. No more diarrhea. Electrolytes replaced. Continue to monitor     Consultants/Specialty  Cardiology  Psychiatry  Surgery    Code Status  Full Code    Disposition  SNF placement pending   Patient needs help for daily activities  Patient is medically cleared      Review of Systems  Review of Systems   Constitutional: Positive for malaise/fatigue. Negative for chills, diaphoresis and fever.   HENT: Negative for congestion, hearing loss and sore throat.    Eyes: Negative for blurred vision.   Respiratory: Negative for cough, shortness of breath and wheezing.    Cardiovascular: Negative for chest pain, palpitations and leg swelling.   Gastrointestinal: Negative for abdominal pain, constipation, diarrhea, heartburn, nausea and vomiting.   Genitourinary: Negative for dysuria, flank pain and hematuria.   Musculoskeletal: Negative for back pain, joint pain, myalgias and neck pain.   Skin: Negative for rash.   Neurological: Positive for weakness. Negative for dizziness, sensory change, speech change, focal weakness and headaches.   Psychiatric/Behavioral: Negative for depression, hallucinations and suicidal ideas. The patient is not nervous/anxious.       Physical Exam  Temp:  [35.9 °C (96.7 °F)-36.6 °C (97.8 °F)] 36.6 °C (97.8 °F)  Pulse:  [58-95] 58  Resp:  [17-18] 18  BP: (109-130)/(79-84) 123/84  SpO2:  [93 %-98 %] 97 %    Physical Exam  Vitals and nursing note reviewed.   Constitutional:        Appearance: He is obese.   HENT:      Head: Normocephalic and atraumatic.      Nose: No congestion.      Mouth/Throat:      Mouth: Mucous membranes are moist.   Eyes:      Extraocular Movements: Extraocular movements intact.      Conjunctiva/sclera: Conjunctivae normal.      Pupils: Pupils are equal, round, and reactive to light.   Cardiovascular:      Rate and Rhythm: Normal rate. Rhythm irregular.   Pulmonary:      Effort: Pulmonary effort is normal. No respiratory distress.      Breath sounds: No wheezing or rales.   Abdominal:      General: There is no distension.      Tenderness: There is no abdominal tenderness. There is no guarding or rebound.      Comments: Wound vac in place    Musculoskeletal:      Cervical back: No muscular tenderness.      Right lower leg: No edema.      Left lower leg: No edema.   Skin:     General: Skin is warm and dry.      Findings: No bruising.   Neurological:      General: No focal deficit present.      Mental Status: He is alert and oriented to person, place, and time.      Cranial Nerves: No cranial nerve deficit.      Motor: No weakness.   Psychiatric:         Mood and Affect: Mood is not anxious.         Speech: Speech normal.         Thought Content: Thought content is not paranoid or delusional. Thought content does not include homicidal or suicidal ideation. Thought content does not include homicidal or suicidal plan.         Judgment: Judgment is not impulsive.       Fluids    Intake/Output Summary (Last 24 hours) at 6/17/2021 1346  Last data filed at 6/17/2021 1309  Gross per 24 hour   Intake 360 ml   Output 1775 ml   Net -1415 ml       Laboratory  Recent Labs     06/15/21  1345 06/17/21  0125   WBC 10.4 9.2   RBC 3.88* 3.70*   HEMOGLOBIN 11.2* 10.4*   HEMATOCRIT 35.3* 33.0*   MCV 91.0 89.2   MCH 28.9 28.1   MCHC 31.7* 31.5*   RDW 53.5* 52.0*   PLATELETCT 301 316   MPV 10.5 11.1     Recent Labs     06/15/21  1345 06/17/21  0125   SODIUM 140 137   POTASSIUM 3.6 3.1*    CHLORIDE 110 107   CO2 22 21   GLUCOSE 108* 110*   BUN 14 18   CREATININE 1.68* 1.50*   CALCIUM 8.1* 8.3*                   Imaging  US-RENAL   Final Result      1.  Low-level echogenic debris in the urinary bladder is noted which could indicate UTI or blood within the bladder.      2.  No hydronephrosis.      IR-MIDLINE CATHETER INSERTION WO GUIDANCE > AGE 3   Final Result                  Ultrasound-guided midline placement performed by qualified nursing staff    as above.          EC-ECHOCARDIOGRAM COMPLETE W/ CONT   Final Result      DX-CHEST-PORTABLE (1 VIEW)   Final Result      1.  Minor asymmetric interstitial opacity in the right lower lobe which could represent minor interstitial edema, pneumonia, or fibrotic change.      2.  No left lung consolidation.      3.  No evidence of congestive failure.      RG-[id number]-6 VIEW   Final Result         No specific finding to suggest small bowel obstruction.      KE-[id number]-1 VIEW   Final Result         No significant interval change.      DX-CHEST-LIMITED (1 VIEW)   Final Result         Diffuse interstitial prominence could relate to mild pulmonary edema or atypical infection      [id number] VIEW   Final Result      Increased colonic gas without definite bowel obstruction.      US-RENAL   Final Result      No evidence of hydronephrosis.      Lobulated kidneys bilaterally.      CT-ABDOMEN-PELVIS WITH   Final Result      1.  There is no evidence of small bowel obstruction.   2.  There is a left lower quadrant ostomy.   3.  There is fluid distention of the colon distal to the surgical material in the left mid descending colon extending all the way to the rectum. There is no pneumatosis or free air.   4.  There is cholelithiasis without biliary dilatation.   5.  There has been interval removal of the drainage catheter anterior to the spleen with minimal hypodense area in the anterior spleen again noted. There is no new fluid collection.      IR-US GUIDED PIV   Final  Result    Ultrasound-guided PERIPHERAL IV INSERTION performed by    qualified nursing staff as above.      EC-ECHOCARDIOGRAM COMPLETE W/O CONT   Final Result      DX-LUMBAR SPINE-2 OR 3 VIEWS   Final Result      Moderate compression deformity of T11 is new compared to 2018.      Mild wedge deformity of T12 is unchanged.      Degenerative changes including facet arthropathy.      Mild retrolisthesis of L5 on S1 and L3 on L4.              Assessment/Plan  * Debility- (present on admission)  Assessment & Plan  PT and OT; SNF  vit d level low 5/29- start replacement   Patient has generalized weakness  Poor prognosis    Leukocytosis- (present on admission)  Assessment & Plan  Around 12-13  No fever or chills  Continue his medications  Consider repeat CT scan for abdomen if needed  6/16- improving   6/17- improving     Obstructive uropathy- (present on admission)  Assessment & Plan  Bladder scan and straight cath prn    Permanent atrial fibrillation (HCC)- (present on admission)  Assessment & Plan  6/7- a-fib persistent. Cardiology consulted again to start amiodarone   Stop digoxin high risk toxicity with SRUTHI   Stop metoprolol since hypotensive   Continue eliquis   6/15- still sometimes RVR. Continue to monitor.   6/16- somehow improving. Continue same treatment     Chronic venous insufficiency of lower extremity- (present on admission)  Assessment & Plan  compression stockings.    Chronic heart failure with preserved ejection fraction (HCC)- (present on admission)  Assessment & Plan  Toprol on hold due to low BP and orthostatic hypo  C/w Atorvastatin  Monitor fluid status and Cr    Type 2 diabetes mellitus with hyperglycemia, with long-term current use of insulin (HCC)- (present on admission)  Assessment & Plan  Follow bmp  Currently not requiring Insulin    Major depressive disorder, recurrent, severe with psychotic features (HCC)- (present on admission)  Assessment & Plan  Geodon, Paxil, Klonopin  Psychiatry following      Colostomy present (HCC)- (present on admission)  Assessment & Plan  Exploratory laparotomy with colostomy takedown and closure 5/27/2021  Segmental colectomy, colostomy creation, mobilization of splenic flexure, wound VAC placement, irrigation and debridement of flank wound on 11/10/2020.  Improving on his leukocytosis  No fever or chills and wound VAC in place   6/15- off antibiotic   working for SNF  6/16- plan for more staples and wash out after three days         Hypothyroidism- (present on admission)  Assessment & Plan  Increased Levothyroxine to 125 mcg  Check TSH on 6/29/2021    SRUTHI (acute kidney injury) (HCC)- (present on admission)  Assessment & Plan  6/1- resolved   FeNa showed prerenal  6/15- stable. Stop IVF. Continue to monitor. Consider to add lasix later on if BP stable   6/16- stable. Continue to monitor. Start lasix.   6/17-remain stable. Even on lasix     Adrenal insufficiency (HCC)- (present on admission)  Assessment & Plan  Likely  Redcued corticef 5 mg BID 6/13  And then corticef 5 mg daily on 7/4  He need endo evaluation as outpatient    Suicidal ideation- (present on admission)  Assessment & Plan  6/4- legal hold discontinued.   Possible SNF placement     Orthostatic hypotension- (present on admission)  Assessment & Plan  Stop florinef. Consider midodrine once heart rate improving   Florinef increase risk of water retention, poor wound healing and he is already on hydrocortisone   Stop metoprolol or other BP meds  6/16- improving, he is off midodrine/florinef. Start low dose of lasix. Continue to monitor   6/17- tolerating lasix well       Mixed hyperlipidemia- (present on admission)  Assessment & Plan  Atorvastatin.     Lower limb amputation, great toe (HCC)- (present on admission)  Assessment & Plan  Monitor  No signs of infection     Hypomagnesemia- (present on admission)  Assessment & Plan  Monitor and replace as needed     Class 2 severe obesity with serious comorbidity and body mass  index (BMI) of 35.0 to 35.9 in adult (HCC)- (present on admission)  Assessment & Plan  Body mass index is 35.19 kg/m².    Hypokalemia- (present on admission)  Assessment & Plan  Monitor and replete as needed       VTE prophylaxis: eliquis       Interventions to be considered in all patients in order to minimize the risk of delirium.   -do not disturb patient (vitals or lab draws) between the hours of 10 PM and 6 AM.  -ideally the patient should not sleep during the day and we should avoid day time naps.   -up in chair for meals  -ambulate at least three times daily, as able  -watch for constipation  -timed voiding - ask patient is she would like to go to the bathroom q 2-3 hours, except during the do not disturb hours.   -remove all necessary lines (central lines, peripheral IVs, feeding tubes, frye catheters)  -unless patient has shown harm to self or others I would recommend against use of restraints - either chemical or physical (antipsychotics)   -minimize polypharmacy, do not dose medication during sleep hours

## 2021-06-17 NOTE — PROGRESS NOTES
Received report from previous shift RN  Assessment complete.  A&O x 4. Patient calls appropriately.  Patient ambulates with x2 maximum assist. Bed alarm on.   Patient has 7/10 pain. Pain managed with prescribed medications.  Denies N&V. Tolerating low fiber (GI soft) diet.  Surgical Midline incision with wound vac in place, CDI. No leaks noted   + void, + flatus, last BM 6/16.  Patient denies SOB.    Patient laying in bed. Review plan with of care with patient. Call light and personal belongings with in reach. Hourly rounding in place. All needs met at this time.

## 2021-06-17 NOTE — CARE PLAN
Problem: Knowledge Deficit - Standard  Goal: Patient and family/care givers will demonstrate understanding of plan of care, disease process/condition, diagnostic tests and medications  Outcome: Progressing     Problem: Respiratory  Goal: Patient will achieve/maintain optimum respiratory ventilation and gas exchange  Outcome: Progressing     Problem: Pain - Standard  Goal: Alleviation of pain or a reduction in pain to the patient’s comfort goal  Outcome: Progressing     The patient is Stable - Low risk of patient condition declining or worsening    Shift Goals  Clinical Goals: skin integrity   Patient Goals: rest, pain control   Family Goals: n/a    Progress made toward(s) clinical / shift goals: Q2 turns, barrier cream/paste, and inter dry in place.     Patient is not progressing towards the following goals:

## 2021-06-18 PROBLEM — D64.9 NORMOCYTIC ANEMIA: Status: ACTIVE | Noted: 2021-06-18

## 2021-06-18 LAB
ALBUMIN SERPL BCP-MCNC: 2.2 G/DL (ref 3.2–4.9)
ALBUMIN/GLOB SERPL: 0.7 G/DL
ALP SERPL-CCNC: 83 U/L (ref 30–99)
ALT SERPL-CCNC: 8 U/L (ref 2–50)
ANION GAP SERPL CALC-SCNC: 7 MMOL/L (ref 7–16)
AST SERPL-CCNC: 17 U/L (ref 12–45)
BASOPHILS # BLD AUTO: 0.9 % (ref 0–1.8)
BASOPHILS # BLD: 0.08 K/UL (ref 0–0.12)
BILIRUB SERPL-MCNC: 0.3 MG/DL (ref 0.1–1.5)
BUN SERPL-MCNC: 22 MG/DL (ref 8–22)
CALCIUM SERPL-MCNC: 8.2 MG/DL (ref 8.5–10.5)
CHLORIDE SERPL-SCNC: 106 MMOL/L (ref 96–112)
CO2 SERPL-SCNC: 23 MMOL/L (ref 20–33)
CREAT SERPL-MCNC: 1.72 MG/DL (ref 0.5–1.4)
EOSINOPHIL # BLD AUTO: 0.39 K/UL (ref 0–0.51)
EOSINOPHIL NFR BLD: 4.3 % (ref 0–6.9)
ERYTHROCYTE [DISTWIDTH] IN BLOOD BY AUTOMATED COUNT: 51.7 FL (ref 35.9–50)
GLOBULIN SER CALC-MCNC: 3.1 G/DL (ref 1.9–3.5)
GLUCOSE SERPL-MCNC: 99 MG/DL (ref 65–99)
HCT VFR BLD AUTO: 30.3 % (ref 42–52)
HEMOCCULT STL QL: NEGATIVE
HGB BLD-MCNC: 9.9 G/DL (ref 14–18)
IMM GRANULOCYTES # BLD AUTO: 0.03 K/UL (ref 0–0.11)
IMM GRANULOCYTES NFR BLD AUTO: 0.3 % (ref 0–0.9)
LYMPHOCYTES # BLD AUTO: 2.72 K/UL (ref 1–4.8)
LYMPHOCYTES NFR BLD: 29.7 % (ref 22–41)
MAGNESIUM SERPL-MCNC: 1.6 MG/DL (ref 1.5–2.5)
MCH RBC QN AUTO: 28.9 PG (ref 27–33)
MCHC RBC AUTO-ENTMCNC: 32.7 G/DL (ref 33.7–35.3)
MCV RBC AUTO: 88.6 FL (ref 81.4–97.8)
MONOCYTES # BLD AUTO: 0.79 K/UL (ref 0–0.85)
MONOCYTES NFR BLD AUTO: 8.6 % (ref 0–13.4)
NEUTROPHILS # BLD AUTO: 5.15 K/UL (ref 1.82–7.42)
NEUTROPHILS NFR BLD: 56.2 % (ref 44–72)
NRBC # BLD AUTO: 0 K/UL
NRBC BLD-RTO: 0 /100 WBC
PLATELET # BLD AUTO: 261 K/UL (ref 164–446)
PMV BLD AUTO: 10.7 FL (ref 9–12.9)
POTASSIUM SERPL-SCNC: 3.4 MMOL/L (ref 3.6–5.5)
PROT SERPL-MCNC: 5.3 G/DL (ref 6–8.2)
RBC # BLD AUTO: 3.42 M/UL (ref 4.7–6.1)
SODIUM SERPL-SCNC: 136 MMOL/L (ref 135–145)
WBC # BLD AUTO: 9.2 K/UL (ref 4.8–10.8)

## 2021-06-18 PROCEDURE — 80053 COMPREHEN METABOLIC PANEL: CPT

## 2021-06-18 PROCEDURE — 700102 HCHG RX REV CODE 250 W/ 637 OVERRIDE(OP): Performed by: INTERNAL MEDICINE

## 2021-06-18 PROCEDURE — 83735 ASSAY OF MAGNESIUM: CPT

## 2021-06-18 PROCEDURE — 85025 COMPLETE CBC W/AUTO DIFF WBC: CPT

## 2021-06-18 PROCEDURE — A9270 NON-COVERED ITEM OR SERVICE: HCPCS | Performed by: INTERNAL MEDICINE

## 2021-06-18 PROCEDURE — 97606 NEG PRS WND THER DME>50 SQCM: CPT

## 2021-06-18 PROCEDURE — 700111 HCHG RX REV CODE 636 W/ 250 OVERRIDE (IP): Performed by: INTERNAL MEDICINE

## 2021-06-18 PROCEDURE — 302098 PASTE RING (FLAT): Performed by: INTERNAL MEDICINE

## 2021-06-18 PROCEDURE — 82272 OCCULT BLD FECES 1-3 TESTS: CPT

## 2021-06-18 PROCEDURE — 99232 SBSQ HOSP IP/OBS MODERATE 35: CPT | Performed by: INTERNAL MEDICINE

## 2021-06-18 PROCEDURE — 770001 HCHG ROOM/CARE - MED/SURG/GYN PRIV*

## 2021-06-18 RX ORDER — POTASSIUM CHLORIDE 20 MEQ/1
40 TABLET, EXTENDED RELEASE ORAL ONCE
Status: COMPLETED | OUTPATIENT
Start: 2021-06-18 | End: 2021-06-18

## 2021-06-18 RX ORDER — OMEPRAZOLE 20 MG/1
40 CAPSULE, DELAYED RELEASE ORAL EVERY 12 HOURS
Status: DISCONTINUED | OUTPATIENT
Start: 2021-06-18 | End: 2021-07-09 | Stop reason: HOSPADM

## 2021-06-18 RX ORDER — MAGNESIUM SULFATE HEPTAHYDRATE 40 MG/ML
2 INJECTION, SOLUTION INTRAVENOUS ONCE
Status: COMPLETED | OUTPATIENT
Start: 2021-06-18 | End: 2021-06-18

## 2021-06-18 RX ADMIN — POTASSIUM CHLORIDE 40 MEQ: 1500 TABLET, EXTENDED RELEASE ORAL at 08:31

## 2021-06-18 RX ADMIN — CLONAZEPAM 1 MG: 1 TABLET ORAL at 18:16

## 2021-06-18 RX ADMIN — PAROXETINE HYDROCHLORIDE 20 MG: 20 TABLET, FILM COATED ORAL at 08:31

## 2021-06-18 RX ADMIN — AMIODARONE HYDROCHLORIDE 200 MG: 200 TABLET ORAL at 18:16

## 2021-06-18 RX ADMIN — OMEPRAZOLE 40 MG: 20 CAPSULE, DELAYED RELEASE ORAL at 18:15

## 2021-06-18 RX ADMIN — FUROSEMIDE 20 MG: 20 TABLET ORAL at 04:55

## 2021-06-18 RX ADMIN — OXYCODONE 10 MG: 5 TABLET ORAL at 12:51

## 2021-06-18 RX ADMIN — HYDROCORTISONE 5 MG: 5 TABLET ORAL at 04:55

## 2021-06-18 RX ADMIN — NYSTATIN 1 APPLICATION: 100000 POWDER TOPICAL at 04:41

## 2021-06-18 RX ADMIN — Medication 1000 UNITS: at 04:55

## 2021-06-18 RX ADMIN — LEVOTHYROXINE SODIUM 125 MCG: 0.12 TABLET ORAL at 04:55

## 2021-06-18 RX ADMIN — AMIODARONE HYDROCHLORIDE 200 MG: 200 TABLET ORAL at 04:55

## 2021-06-18 RX ADMIN — HYDROCORTISONE 5 MG: 5 TABLET ORAL at 15:26

## 2021-06-18 RX ADMIN — APIXABAN 5 MG: 5 TABLET, FILM COATED ORAL at 04:55

## 2021-06-18 RX ADMIN — TAMSULOSIN HYDROCHLORIDE 0.4 MG: 0.4 CAPSULE ORAL at 08:31

## 2021-06-18 RX ADMIN — MAGNESIUM SULFATE 2 G: 2 INJECTION INTRAVENOUS at 08:31

## 2021-06-18 RX ADMIN — CLONAZEPAM 0.5 MG: 0.5 TABLET ORAL at 08:30

## 2021-06-18 RX ADMIN — OXYCODONE 10 MG: 5 TABLET ORAL at 02:20

## 2021-06-18 RX ADMIN — HYDROMORPHONE HYDROCHLORIDE 0.5 MG: 1 INJECTION, SOLUTION INTRAMUSCULAR; INTRAVENOUS; SUBCUTANEOUS at 08:13

## 2021-06-18 RX ADMIN — ATORVASTATIN CALCIUM 40 MG: 40 TABLET, FILM COATED ORAL at 18:16

## 2021-06-18 RX ADMIN — OMEPRAZOLE 40 MG: 20 CAPSULE, DELAYED RELEASE ORAL at 08:31

## 2021-06-18 RX ADMIN — OXYCODONE 10 MG: 5 TABLET ORAL at 18:15

## 2021-06-18 RX ADMIN — APIXABAN 5 MG: 5 TABLET, FILM COATED ORAL at 18:16

## 2021-06-18 RX ADMIN — ZIPRASIDONE HYDROCHLORIDE 60 MG: 60 CAPSULE ORAL at 20:27

## 2021-06-18 RX ADMIN — ZIPRASIDONE HYDROCHLORIDE 60 MG: 60 CAPSULE ORAL at 08:30

## 2021-06-18 RX ADMIN — OXYCODONE 10 MG: 5 TABLET ORAL at 21:33

## 2021-06-18 ASSESSMENT — ENCOUNTER SYMPTOMS
CHILLS: 0
SHORTNESS OF BREATH: 0
FEVER: 0
FLANK PAIN: 0
PALPITATIONS: 0
DIARRHEA: 0
DEPRESSION: 0
SPEECH CHANGE: 0
NECK PAIN: 0
HEARTBURN: 0
WEAKNESS: 1
CONSTIPATION: 0
HEADACHES: 0
BACK PAIN: 0
HALLUCINATIONS: 0
COUGH: 0
FOCAL WEAKNESS: 0
DIAPHORESIS: 0
VOMITING: 0
SENSORY CHANGE: 0
WHEEZING: 0
ABDOMINAL PAIN: 0
SORE THROAT: 0
NERVOUS/ANXIOUS: 0
NAUSEA: 0
BLURRED VISION: 0
MYALGIAS: 0
DIZZINESS: 0

## 2021-06-18 ASSESSMENT — FIBROSIS 4 INDEX: FIB4 SCORE: 1.335646142241256435

## 2021-06-18 ASSESSMENT — PAIN DESCRIPTION - PAIN TYPE
TYPE: ACUTE PAIN
TYPE: SURGICAL PAIN;ACUTE PAIN
TYPE: ACUTE PAIN
TYPE: SURGICAL PAIN;ACUTE PAIN
TYPE: ACUTE PAIN
TYPE: SURGICAL PAIN;ACUTE PAIN

## 2021-06-18 NOTE — PROGRESS NOTES
2 RN skin check completed with Meenu  Left buttock red spot slow to swetha  Sacrum, buttocks, groin, posterior thighs and pannus Moisture and redness  Right abdomen wound vac changed today  B/L heels and elbows intact with preventative mepilexes changed   B/L LE skin dryness and flaky  All other skin intact  Q2 turns in place

## 2021-06-18 NOTE — CARE PLAN
Problem: Knowledge Deficit - Standard  Goal: Patient and family/care givers will demonstrate understanding of plan of care, disease process/condition, diagnostic tests and medications  Outcome: Progressing     Problem: Psychosocial  Goal: Patient's ability to identify and develop effective coping behaviors will improve  Outcome: Progressing   The patient is Stable - Low risk of patient condition declining or worsening    Shift Goals  Clinical Goals: pain control, mobility  Patient Goals: rest, comfort and pain control  Family Goals: n/a    Progress made toward(s) clinical / shift goals:  Pt agrees to mobilize today.  Medicating for pain PRN per MAR    Patient is not progressing towards the following goals:      Problem: Fall Risk  Goal: Patient will remain free from falls  Outcome: Not Progressing

## 2021-06-18 NOTE — PROGRESS NOTES
2 RN skin check complete   Devices in place: wound vac dressing to abdomen  Sacrum red but blanching; excoriation noted.  Excoriation to groin and pannus.  Heels and elbows intact and blanching  Wound consult already in place.  The follow interventions in place: interdry, nystatin powder, mepilex, barrier cream and paste, barrier wipes, pillows and q 2 turns. Pt is on a DARYL mattress.

## 2021-06-18 NOTE — PROGRESS NOTES
[facility] Medicine Daily Progress Note    Date of Service  6/18/2021    Chief Complaint  56 y.o. male admitted 2/9/2021 with Syncope.    Hospital Course  56-year-old male with history of atrial fibrillation, diabetes, adrenal insufficiency, depression and obesity presented 2/9 with syncope and bradycardia with hypotension. Patient was on diltiazem, digoxin and metoprolol which all were held; once, bradycardia had improved and resumed metoprolol and digoxin with later switched to amiodarone by cardiologist, Eliquis was started for anticoagulation.    He also had a possible history of adrenal insufficiency, he was already on Florinef and midodrine.  Midodrine was discontinued and he was started on cortisol. Also florinef was discontinued since volume overload.     Further work-up showed that he had gram-negative bacteremia as well as urinary tract infection and acute kidney injury.  He was initially placed on IV Zosyn, later changed to oral Bactrim to finish the course.      On admission, he also expressed suicidal ideation.  Psychiatry was consulted the case, he was placed on a legal hold.  He was noted to have severe depression.  He has been placed on medication the form of Geodon, Paxil, Klonopin.  He has a colostomy in place, which the patient believes contributes to his severe depression because of ostomy care. Legal hold though discontinued because pt did improve significantly once ostomy was reversed.     He had history of bowel perforation and splenic fluid collection requiring Segmental colectomy, colostomy creation, mobilization of splenic flexure, wound VAC placement, irrigation and debridement of flank wound on 11/10/2020.  Surgery was consulted on the case, and he underwent exploratory laparotomy and colostomy takedown and closure on 5/27/2021. Pt was followed by wound care post op with wound vac interval changes.    Patient lives by himself and is not able to take care of himself at this time, PT and OT  evaluated the patient and recommended SNF    Interval Problem Update  6/15- he is feeling well overall. He had two bowel movement. No diarrhea. He is volume overload. Stop IVF. Continue to monitor. Consider start dose of lasix in the future. Still remain tachycardic. To consider to increase amiodarone ?? Or add metoprolol. Pt very sensitive to BP meds    6/16- pt doing well. No more diarrhea from yesterday. Hold on stool softer. BP improving. Start low dose lasix. Continue to monitor. More staples this week.     6/17- doing very well. No more diarrhea. Electrolytes replaced. Continue to monitor     6/18- doing well. Diarrhea subsided. Some more staples on his wound today. Hgb is dropping some though.  Continue to monitor    Consultants/Specialty  Cardiology  Psychiatry  Surgery    Code Status  Full Code    Disposition  SNF placement pending   Patient needs help for daily activities  Patient is medically cleared      Review of Systems  Review of Systems   Constitutional: Positive for malaise/fatigue. Negative for chills, diaphoresis and fever.   HENT: Negative for congestion, hearing loss and sore throat.    Eyes: Negative for blurred vision.   Respiratory: Negative for cough, shortness of breath and wheezing.    Cardiovascular: Negative for chest pain, palpitations and leg swelling.   Gastrointestinal: Negative for abdominal pain, constipation, diarrhea, heartburn, nausea and vomiting.   Genitourinary: Negative for dysuria, flank pain and hematuria.   Musculoskeletal: Negative for back pain, joint pain, myalgias and neck pain.   Skin: Negative for rash.   Neurological: Positive for weakness. Negative for dizziness, sensory change, speech change, focal weakness and headaches.   Psychiatric/Behavioral: Negative for depression, hallucinations and suicidal ideas. The patient is not nervous/anxious.       Physical Exam  Temp:  [36.6 °C (97.8 °F)-36.9 °C (98.4 °F)] 36.7 °C (98 °F)  Pulse:  [63-98] 81  Resp:  [16-18]  16  BP: (100-119)/(64-83) 111/69  SpO2:  [92 %-98 %] 98 %    Physical Exam  Vitals and nursing note reviewed.   Constitutional:       Appearance: He is obese.   HENT:      Head: Normocephalic and atraumatic.      Nose: No congestion.      Mouth/Throat:      Mouth: Mucous membranes are moist.   Eyes:      Extraocular Movements: Extraocular movements intact.      Conjunctiva/sclera: Conjunctivae normal.      Pupils: Pupils are equal, round, and reactive to light.   Cardiovascular:      Rate and Rhythm: Normal rate. Rhythm irregular.   Pulmonary:      Effort: Pulmonary effort is normal. No respiratory distress.      Breath sounds: No wheezing or rales.   Abdominal:      General: There is no distension.      Tenderness: There is no abdominal tenderness. There is no guarding or rebound.      Comments: Wound vac in place    Musculoskeletal:      Cervical back: No muscular tenderness.      Right lower leg: No edema.      Left lower leg: No edema.   Skin:     General: Skin is warm and dry.      Findings: No bruising.   Neurological:      General: No focal deficit present.      Mental Status: He is alert and oriented to person, place, and time.      Cranial Nerves: No cranial nerve deficit.      Motor: No weakness.   Psychiatric:         Mood and Affect: Mood is not anxious.         Speech: Speech normal.         Thought Content: Thought content is not paranoid or delusional. Thought content does not include homicidal or suicidal ideation. Thought content does not include homicidal or suicidal plan.         Judgment: Judgment is not impulsive.       Fluids    Intake/Output Summary (Last 24 hours) at 6/18/2021 1108  Last data filed at 6/18/2021 0300  Gross per 24 hour   Intake 480 ml   Output 1750 ml   Net -1270 ml       Laboratory  Recent Labs     06/15/21  1345 06/17/21  0125 06/18/21  0230   WBC 10.4 9.2 9.2   RBC 3.88* 3.70* 3.42*   HEMOGLOBIN 11.2* 10.4* 9.9*   HEMATOCRIT 35.3* 33.0* 30.3*   MCV 91.0 89.2 88.6   MCH 28.9  28.1 28.9   MCHC 31.7* 31.5* 32.7*   RDW 53.5* 52.0* 51.7*   PLATELETCT 301 316 261   MPV 10.5 11.1 10.7     Recent Labs     06/15/21  1345 06/17/21  0125 06/18/21  0230   SODIUM 140 137 136   POTASSIUM 3.6 3.1* 3.4*   CHLORIDE 110 107 106   CO2 22 21 23   GLUCOSE 108* 110* 99   BUN 14 18 22   CREATININE 1.68* 1.50* 1.72*   CALCIUM 8.1* 8.3* 8.2*                   Imaging  US-RENAL   Final Result      1.  Low-level echogenic debris in the urinary bladder is noted which could indicate UTI or blood within the bladder.      2.  No hydronephrosis.      IR-MIDLINE CATHETER INSERTION WO GUIDANCE > AGE 3   Final Result                  Ultrasound-guided midline placement performed by qualified nursing staff    as above.          EC-ECHOCARDIOGRAM COMPLETE W/ CONT   Final Result      DX-CHEST-PORTABLE (1 VIEW)   Final Result      1.  Minor asymmetric interstitial opacity in the right lower lobe which could represent minor interstitial edema, pneumonia, or fibrotic change.      2.  No left lung consolidation.      3.  No evidence of congestive failure.      [id number] VIEW   Final Result         No specific finding to suggest small bowel obstruction.      [id number] VIEW   Final Result         No significant interval change.      DX-CHEST-LIMITED (1 VIEW)   Final Result         Diffuse interstitial prominence could relate to mild pulmonary edema or atypical infection      [id number] VIEW   Final Result      Increased colonic gas without definite bowel obstruction.      US-RENAL   Final Result      No evidence of hydronephrosis.      Lobulated kidneys bilaterally.      CT-ABDOMEN-PELVIS WITH   Final Result      1.  There is no evidence of small bowel obstruction.   2.  There is a left lower quadrant ostomy.   3.  There is fluid distention of the colon distal to the surgical material in the left mid descending colon extending all the way to the rectum. There is no pneumatosis or free air.   4.  There is cholelithiasis  without biliary dilatation.   5.  There has been interval removal of the drainage catheter anterior to the spleen with minimal hypodense area in the anterior spleen again noted. There is no new fluid collection.      IR-US GUIDED PIV   Final Result    Ultrasound-guided PERIPHERAL IV INSERTION performed by    qualified nursing staff as above.      EC-ECHOCARDIOGRAM COMPLETE W/O CONT   Final Result      DX-LUMBAR SPINE-2 OR 3 VIEWS   Final Result      Moderate compression deformity of T11 is new compared to 2018.      Mild wedge deformity of T12 is unchanged.      Degenerative changes including facet arthropathy.      Mild retrolisthesis of L5 on S1 and L3 on L4.              Assessment/Plan  * Debility- (present on admission)  Assessment & Plan  PT and OT; SNF  vit d level low 5/29- start replacement   Patient has generalized weakness  Poor prognosis    Normocytic anemia- (present on admission)  Assessment & Plan  6/18- hgb continues slowly to drop  Pt on chronic steroid which increase risk for stress ulcers   Start omeprazole 40 mg BID   Iron studies, TSH/B/FOLAte, occult studies to follow     Leukocytosis- (present on admission)  Assessment & Plan  Around 12-13  No fever or chills  Continue his medications  Consider repeat CT scan for abdomen if needed  6/18- normal. Continue to monitor. Consider abdomen CT if signs of infection      Obstructive uropathy- (present on admission)  Assessment & Plan  Bladder scan and straight cath prn    Permanent atrial fibrillation (HCC)- (present on admission)  Assessment & Plan  6/7- a-fib persistent. Cardiology consulted again to start amiodarone   Stop digoxin high risk toxicity with SRUTHI   Stop metoprolol since hypotensive   Continue eliquis   6/18- still persistent a-fib but better rate controled. Continue amiodarone. Continue to monitor     Chronic venous insufficiency of lower extremity- (present on admission)  Assessment & Plan  compression stockings.    Chronic heart  failure with preserved ejection fraction (HCC)- (present on admission)  Assessment & Plan  Toprol on hold due to low BP and orthostatic hypo  C/w Atorvastatin  Monitor fluid status and Cr  6/18- tolerating  lasix. Consider to add BB later     Type 2 diabetes mellitus with hyperglycemia, with long-term current use of insulin (HCC)- (present on admission)  Assessment & Plan  Follow bmp  Currently not requiring Insulin    Major depressive disorder, recurrent, severe with psychotic features ([facility])- (present on admission)  Assessment & Plan  Geodon, Paxil, Klonopin  Psychiatry following     Colostomy present ([facility])- (present on admission)  Assessment & Plan  Exploratory laparotomy with colostomy takedown and closure 5/27/2021  Segmental colectomy, colostomy creation, mobilization of splenic flexure, wound VAC placement, irrigation and debridement of flank wound on 11/10/2020.  Improving on his leukocytosis  No fever or chills and wound VAC in place   6/15- off antibiotic   working for SNF  6/16- plan for more staples and wash out after three days   6/19- doing well. Continues to heal. Possible wash out ?? Continue to monitor. Pictures reviewed         Hypothyroidism- (present on admission)  Assessment & Plan  Increased Levothyroxine to 125 mcg  Check TSH on 6/29/2021    SRUTHI (acute kidney injury) ([facility])- (present on admission)  Assessment & Plan  6/1- resolved   FeNa showed prerenal  6/18- remain stable on lasix. Continue to monitor     Adrenal insufficiency (HCC)- (present on admission)  Assessment & Plan  Likely  Redcued corticef 5 mg BID 6/13  And then corticef 5 mg daily on 7/4  He need endo evaluation as outpatient    Suicidal ideation- (present on admission)  Assessment & Plan  6/4- legal hold discontinued.   Possible SNF placement     Orthostatic hypotension- (present on admission)  Assessment & Plan  Stop florinef. Consider midodrine once heart rate improving   Florinef increase risk of water retention, poor wound  healing and he is already on hydrocortisone   Stop metoprolol or other BP meds  6/16- improving, he is off midodrine/florinef. Start low dose of lasix. Continue to monitor   6/17- tolerating lasix well   6/18- tolerating lasix. Doing well.       Mixed hyperlipidemia- (present on admission)  Assessment & Plan  Atorvastatin.     Lower limb amputation, great toe (HCC)- (present on admission)  Assessment & Plan  Monitor  No signs of infection     Hypomagnesemia- (present on admission)  Assessment & Plan  Monitor and replace as needed     Class 2 severe obesity with serious comorbidity and body mass index (BMI) of 35.0 to 35.9 in adult (HCC)- (present on admission)  Assessment & Plan  Body mass index is 35.19 kg/m².    Hypokalemia- (present on admission)  Assessment & Plan  Monitor and replete as needed       VTE prophylaxis: eliquis       Interventions to be considered in all patients in order to minimize the risk of delirium.   -do not disturb patient (vitals or lab draws) between the hours of 10 PM and 6 AM.  -ideally the patient should not sleep during the day and we should avoid day time naps.   -up in chair for meals  -ambulate at least three times daily, as able  -watch for constipation  -timed voiding - ask patient is she would like to go to the bathroom q 2-3 hours, except during the do not disturb hours.   -remove all necessary lines (central lines, peripheral IVs, feeding tubes, frye catheters)  -unless patient has shown harm to self or others I would recommend against use of restraints - either chemical or physical (antipsychotics)   -minimize polypharmacy, do not dose medication during sleep hours

## 2021-06-18 NOTE — PROGRESS NOTES
Report received. Assessment completed.  Pt is A&O x4. Pt on room air.   Medicating for pain PRN per MAR  Denies nausea.   B/L LE +numbness, +tingling.  Midline inc with vac changed today, Dr Desouza at bedside during change to add staples to incision  interdry changed to Pannus for moisture  + loose BM today.  +void.  Tolerating diet.   Pt up with max assist to edge of bed, Q2 Turns in place  Call light and belongings within reach. All needs met at this time. Fall Precautions and hourly rounding in place.

## 2021-06-18 NOTE — CARE PLAN
Problem: Psychosocial  Goal: Patient's ability to identify and develop effective coping behaviors will improve  Outcome: Progressing     Problem: Fall Risk  Goal: Patient will remain free from falls  Outcome: Progressing  Aspiration and fall precautions in place; bed alarm in use     Problem: Pain - Standard  Goal: Alleviation of pain or a reduction in pain to the patient’s comfort goal  Outcome: Progressing  PRN pain meds in use     The patient is Stable - Low risk of patient condition declining or worsening    Shift Goals  Clinical Goals: pain control, wound healing and comfort  Patient Goals: rest, comfort and pain control    Progress made toward(s) clinical / shift goals:  yes    Patient is not progressing towards the following goals: mobility. Pt has yet to ambulate 25-50ft

## 2021-06-18 NOTE — WOUND TEAM
[facility]  Inpatient Services  Wound and Skin Care Progress Note    Admission Date: 2/9/2021     Last order of IP CONSULT TO WOUND CARE was found on 3/18/2021 from Hospital Encounter on 2/9/2021     HPI, PMH, SH: Reviewed    Past Surgical History:   Procedure Laterality Date   • PB EXPLORATORY OF ABDOMEN  5/27/2021    Procedure: LAPAROTOMY, EXPLORATORY;  Surgeon: Kin Desouza D.O.;  Location: [facility];  Service: General   • COLOSTOMY TAKEDOWN  5/27/2021    Procedure: COLOSTOMY TAKEDOWN AND CLOSURE;  Surgeon: Kin Desouza D.O.;  Location: SURGERY [facility];  Service: General   • COLECTOMY N/A 11/10/2020    Procedure: COLECTOMY-SEGMENTAL, COLOSTOMY, MOBILIZATION OF SPLENIC FLEXURE, WOUND VAC PLACEMENT, IRRIGATION AND DEBRIDMENT FLANK WOUND;  Surgeon: Kin Desouza D.O.;  Location: [facility];  Service: General   • ZZZ CARDIAC CATH  07/21/2018    EF 45%, normal coronaries.   • IRRIGATION & DEBRIDEMENT ORTHO Right 2/19/2018    Procedure: IRRIGATION & DEBRIDEMENT ORTHO-FOOT;  Surgeon: Kirby Lopez M.D.;  Location: [facility];  Service: Orthopedics   • TOE AMPUTATION Right 1/17/2018    Procedure: TOE AMPUTATION-1ST RAY;  Surgeon: Doug Delong M.D.;  Location: [facility];  Service: Orthopedics   • TOE AMPUTATION Right 11/10/2017    Procedure: TOE AMPUTATION;  Surgeon: Osorio Leo M.D.;  Location: [facility];  Service: Orthopedics     Social History     Tobacco Use   • Smoking status: Never Smoker   • Smokeless tobacco: Never Used   Substance Use Topics   • Alcohol use: No     Chief Complaint   Patient presents with   • Syncope     X1 this morning when standing up to go to bathroom     Diagnosis: Syncope and collapse [R55]  Suicidal ideation [R45.851]  Sepsis due to Klebsiella pneumoniae (HCC) [A41.4]    Unit where seen by Wound Team: T418/00     WOUND CONSULT/FOLLOW UP RELATED TO: abdominal wound    WOUND HISTORY:  Pt is an older  gentleman well known to [facility] wound team 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 Pt currently admitted since 2/9/21 related to SI concerns due to body image issues related to colostomy.      *Pt underwent surgical reversal of colostomy with Dr. Desouza on 5/20/21. MLI was re-opened at that time and was then closed with interrupted staples and packing. Unfortunately MLI continued to Dehisce and therefore wound team was consulted to further manage MLI with dakins packing.     6/3 Wound vac placed    WOUND ASSESSMENT/LDA        Negative Pressure Wound Therapy 06/03/21 Abdomen (Active)   Vacuum Serial Number [id number] 06/14/21 0900   NPWT Pump Mode / Pressure Setting Ulta;125 mmHg 06/18/21 0900   Dressing Type Small;Black Foam (Veraflo);Gray Foam (Cleanse Choice) 06/18/21 0900   Number of Foam Pieces Used 2 06/18/21 0900   Canister Changed No 06/18/21 0900   Output (mL) 100 mL 06/18/21 0300   NEXT Dressing Change/Treatment Date 06/21/21 06/18/21 0900   VAC VeraFlo Irrigant Normal Saline 06/18/21 0900   VAC VeraFlo Soak Time (mins) 10 06/18/21 0900   VAC VeraFlo Instill Volume (ml) 24 06/18/21 0900   VAC VeraFlo - Therapy Time (hrs) 2.5 06/18/21 0900   VAC VeraFlo Pressure (mm/Hg) Intermittent;125 mmHg 06/18/21 0900   WOUND NURSE ONLY - Time Spent with Patient (mins) 60 06/18/21 0900            Wound 05/27/21 Full Thickness Wound Abdomen (Active)   Wound Image     06/16/21 1200   Site Assessment Red;Yellow 06/18/21 0900   Periwound Assessment Clean;Fragile;Satellite lesions 06/18/21 0900   Margins Unattached edges;Defined edges 06/18/21 0900   Closure Secondary intention;Staples 06/18/21 0900   Drainage Amount Small 06/18/21 0900   Drainage Description Serosanguineous 06/18/21 0900   Treatments Cleansed;Site care;Tape change 06/18/21 0900   Wound Cleansing Approved Wound Cleanser 06/18/21 0900   Periwound Protectant Skin Protectant Wipes to Periwound;Paste Ring;Drape 06/18/21 0900   Dressing  Cleansing/Solutions Normal Saline 06/18/21 0900   Dressing Options Wound Vac 06/18/21 0900   Dressing Changed Changed 06/18/21 0900   Dressing Status Clean;Dry;Intact 06/18/21 0900   Dressing Change/Treatment Frequency Monday, Wednesday, Friday, and As Needed 06/18/21 0900   NEXT Dressing Change/Treatment Date 06/21/21 06/18/21 0900   NEXT Weekly Photo (Inpatient Only) 06/23/21 06/18/21 0900   Non-staged Wound Description Full thickness 06/18/21 0900   Exposed Structures Sutures 06/18/21 0900   WOUND NURSE ONLY - Time Spent with Patient (mins) 60 06/16/21 1200           Vascular:    SHAYAN:   No results found.    Lab Values:    Lab Results   Component Value Date/Time    WBC 9.2 06/18/2021 02:30 AM    RBC 3.42 (L) 06/18/2021 02:30 AM    HEMOGLOBIN 9.9 (L) 06/18/2021 02:30 AM    HEMATOCRIT 30.3 (L) 06/18/2021 02:30 AM    CREACTPROT 0.48 06/10/2021 04:05 AM    SEDRATEWES 13 10/23/2018 08:02 AM    HBA1C 5.2 02/14/2021 01:30 AM      Culture Results show:  No results found for this or any previous visit (from the past 720 hour(s)).    Pain Level/Medicated:  IV Morphine immediately prior to dressing change      INTERVENTIONS BY WOUND TEAM:  Chart and images reviewed. Discussed with bedside RN. All areas of concern (based on picture review, LDA review and discussion with bedside RN) have been thoroughly assessed. Documentation of areas based on significant findings. This RN in to assess patient. Performed standard wound care which includes appropriate positioning, dressing removal and non-selective debridement. Pictures and measurements obtained weekly if/when required.              ABD:  Preparation for Dressing removal:  Soaked with NS from NPWT machine  Cleansed with: Wound cleanser and gauze   Sharp debridement: MD rooney in to place staples at superior and distal end.   Joya wound: Cleansed with Wound cleanser and gauze, No sting skin and paste ring x 2, mepitel one to distal and proximal staples. Hydrocolloid thin to  "deroofed bullaes  Primary Dressin piece of grey waffle layer to fascial line sutures and base of wound bed, remaining wound bed filled with complete small VF black foam, edges pulled together and sealed with drape.   Secondary (Outer) Dressing:  Sealed with drape, TRAC pad applied.     Interdisciplinary consultation: Patient, Bedside RN (Lena), Wound RN (Lynne), Gen Surgery (Dr. Rooney)    EVALUATION / RATIONALE FOR TREATMENT:  Most Recent Date:  21: continuing to decrease in slough in wound bed. One suture towards proximal end visible, per MD rooney, leave in place but monitor. Staples added to superior and inferior ends to assist with closure by MD. Continuing to pull edges together, MD will reassess on Monday with wound team to monitor for improvement and decide if surgical I&D is needed or if appropriate to switch to regular NPWT dressing instead of VF.     21: Wound with decreased slough. Wound appears to be \"flattening out.\" Veraflo is assisting with cleansing wound but is making pulling edges together difficult. Discussed with Dr. Rooney. Will plan to Meet with Dr. Rooney on Friday at 8am to assess wound, Dr. Rooney to pull superior and inferior borders together and staple to assist with closure.    21: Wound continues to granulate. Small amount of slough over fascial line. Discussed with Dr. Rooney. Plan to continue with veraflo, settings decreased. Will evaluate the wound with Dr. Rooney on Wednesday at 9am. Per MD may consider OR washout with staples to distal proximal aspect to assist in wound closure vs. Adding staples at bedside.   21:  Wound looks good, minimal slough mainly to fascial line sutures.  VF grey foam layer to assist in debridement of slough.  Dr. Rooney to re-assess Monday and do surgical debridement if needed next week.   21:  Wound continues to progress, pull edges together to lessen width of wound. Dr. Rooney would like to see wound on " Friday 6/7: Pt's wound appears to be improving per measurements and pictures. There is granulation tissue present.   06/5/21: Wound bed improving with increased granulation tissue.  Wound edges approximated prior to drape application.  NPWT continued.    06/03/21:  Fascial sutures intact, wound VAC placed with DR. Desouza.    6/1/21: Wound appears  than yesterdays pictures. Discussed with Dr. Desouza, plan to continue dakins TID for 48hrs, CT scan ordered today. Will re-evaluate on Thursday for potential vac placement.   5/31/21: Wound initially with interrupted staples and packing now with dehiscence. Per Dr. Baeza request wound team initiated dakins packing. Pt may benefit from NPWT to assist with closure.   5/8/21: wound nearly healed. Does not require advanced wound care. Wound team signing off and nursing to re consult if site worsens. Continue with dressing care per order.  4/21/21: abd wound improving. Nursing to continue dsg changes per order.  4/15: Right posterior thigh DTI now resolved. Pink and blanching. Abdominal wound to surface level continuing with dressing POC.  4/7/21: Thigh wound nearly healed and will likely be resolved at next assessment. Abdomen wound appears larger. Continued with Arti to move through inflammatory phase. Hydrofera blue for its bacteriostatic properties and to assist in drainage management.   03/31/21: thigh wound healing well now to a stage 2.  Continue current plan.  All interventions in place.   ABD more denuded and bloody.  Arti to assist in moving wound through inflammatory phase.   3/23/21: Right posterior thigh wound remains a DTI but appears to be evolving into a stage 2. Wound team was previously consulted regarding sacral wound which appeared to be a skin tear however skin now appears to be intact however discolored. Does not appear to be pressure related as pt is on a waffle, self mobile and sacral mepilex is in use. Area is also not a normal pressure  injury shape. Wound team will continue to follow.   3/18/21: Pt with linear DTIs to R posterior thigh from lying on top of frye cath tubing. Offloading precautions ordered. Wound team to continue following.     Goals: Steady decrease in wound area and depth weekly.    WOUND TEAM PLAN OF CARE ([X] for frequency of wound follow up,):   Nursing to follow orders written for wound care. Contact wound team if area fails to progress, deteriorates or with any questions/concerns  Dressing changes by wound team:                   Follow up 3 times weekly:                NPWT change 3 times weekly:   X  Follow up 1-2 times weekly:     Follow up Bi-Monthly:                   Follow up as needed:   Other (explain):     NURSING PLAN OF CARE ORDERS (X):  Dressing changes: See Dressing Care orders: X  Skin care: See Skin Care orders: x  RN Prevention Protocol: x  Rectal tube care: See Rectal Tube Care orders:   Other orders:        Anticipated discharge plans:   LTACH:        SNF/Rehab:                  Home Health Care:           Outpatient Wound Center:            Self/Family Care:        Other:   Group Home

## 2021-06-19 LAB
ALBUMIN SERPL BCP-MCNC: 2.4 G/DL (ref 3.2–4.9)
ALBUMIN/GLOB SERPL: 0.9 G/DL
ALP SERPL-CCNC: 82 U/L (ref 30–99)
ALT SERPL-CCNC: 13 U/L (ref 2–50)
ANION GAP SERPL CALC-SCNC: 8 MMOL/L (ref 7–16)
AST SERPL-CCNC: 26 U/L (ref 12–45)
BASOPHILS # BLD AUTO: 0.8 % (ref 0–1.8)
BASOPHILS # BLD: 0.07 K/UL (ref 0–0.12)
BILIRUB SERPL-MCNC: 0.3 MG/DL (ref 0.1–1.5)
BUN SERPL-MCNC: 22 MG/DL (ref 8–22)
CALCIUM SERPL-MCNC: 8 MG/DL (ref 8.5–10.5)
CHLORIDE SERPL-SCNC: 106 MMOL/L (ref 96–112)
CO2 SERPL-SCNC: 23 MMOL/L (ref 20–33)
CREAT SERPL-MCNC: 1.79 MG/DL (ref 0.5–1.4)
EOSINOPHIL # BLD AUTO: 0.48 K/UL (ref 0–0.51)
EOSINOPHIL NFR BLD: 5.6 % (ref 0–6.9)
ERYTHROCYTE [DISTWIDTH] IN BLOOD BY AUTOMATED COUNT: 51.8 FL (ref 35.9–50)
FERRITIN SERPL-MCNC: 445 NG/ML (ref 22–322)
FOLATE SERPL-MCNC: 5 NG/ML
GLOBULIN SER CALC-MCNC: 2.6 G/DL (ref 1.9–3.5)
GLUCOSE SERPL-MCNC: 99 MG/DL (ref 65–99)
HCT VFR BLD AUTO: 30.8 % (ref 42–52)
HGB BLD-MCNC: 9.8 G/DL (ref 14–18)
IMM GRANULOCYTES # BLD AUTO: 0.04 K/UL (ref 0–0.11)
IMM GRANULOCYTES NFR BLD AUTO: 0.5 % (ref 0–0.9)
IRON SATN MFR SERPL: 36 % (ref 15–55)
IRON SERPL-MCNC: 52 UG/DL (ref 50–180)
LYMPHOCYTES # BLD AUTO: 2.85 K/UL (ref 1–4.8)
LYMPHOCYTES NFR BLD: 33.3 % (ref 22–41)
MCH RBC QN AUTO: 28.2 PG (ref 27–33)
MCHC RBC AUTO-ENTMCNC: 31.8 G/DL (ref 33.7–35.3)
MCV RBC AUTO: 88.8 FL (ref 81.4–97.8)
MONOCYTES # BLD AUTO: 0.71 K/UL (ref 0–0.85)
MONOCYTES NFR BLD AUTO: 8.3 % (ref 0–13.4)
NEUTROPHILS # BLD AUTO: 4.42 K/UL (ref 1.82–7.42)
NEUTROPHILS NFR BLD: 51.5 % (ref 44–72)
NRBC # BLD AUTO: 0 K/UL
NRBC BLD-RTO: 0 /100 WBC
PLATELET # BLD AUTO: 274 K/UL (ref 164–446)
PMV BLD AUTO: 11.1 FL (ref 9–12.9)
POTASSIUM SERPL-SCNC: 3.7 MMOL/L (ref 3.6–5.5)
PROT SERPL-MCNC: 5 G/DL (ref 6–8.2)
RBC # BLD AUTO: 3.47 M/UL (ref 4.7–6.1)
SODIUM SERPL-SCNC: 137 MMOL/L (ref 135–145)
T4 FREE SERPL-MCNC: 1.48 NG/DL (ref 0.93–1.7)
TIBC SERPL-MCNC: 145 UG/DL (ref 250–450)
TSH SERPL DL<=0.005 MIU/L-ACNC: 6.24 UIU/ML (ref 0.38–5.33)
UIBC SERPL-MCNC: 93 UG/DL (ref 110–370)
VIT B12 SERPL-MCNC: 551 PG/ML (ref 211–911)
WBC # BLD AUTO: 8.6 K/UL (ref 4.8–10.8)

## 2021-06-19 PROCEDURE — 700102 HCHG RX REV CODE 250 W/ 637 OVERRIDE(OP): Performed by: INTERNAL MEDICINE

## 2021-06-19 PROCEDURE — 770001 HCHG ROOM/CARE - MED/SURG/GYN PRIV*

## 2021-06-19 PROCEDURE — 90832 PSYTX W PT 30 MINUTES: CPT | Performed by: PSYCHOLOGIST

## 2021-06-19 PROCEDURE — 82746 ASSAY OF FOLIC ACID SERUM: CPT

## 2021-06-19 PROCEDURE — 302146: Performed by: INTERNAL MEDICINE

## 2021-06-19 PROCEDURE — 84443 ASSAY THYROID STIM HORMONE: CPT

## 2021-06-19 PROCEDURE — A9270 NON-COVERED ITEM OR SERVICE: HCPCS | Performed by: INTERNAL MEDICINE

## 2021-06-19 PROCEDURE — 80053 COMPREHEN METABOLIC PANEL: CPT

## 2021-06-19 PROCEDURE — 82607 VITAMIN B-12: CPT

## 2021-06-19 PROCEDURE — 84439 ASSAY OF FREE THYROXINE: CPT

## 2021-06-19 PROCEDURE — 83540 ASSAY OF IRON: CPT

## 2021-06-19 PROCEDURE — 83550 IRON BINDING TEST: CPT

## 2021-06-19 PROCEDURE — 85025 COMPLETE CBC W/AUTO DIFF WBC: CPT

## 2021-06-19 PROCEDURE — 99232 SBSQ HOSP IP/OBS MODERATE 35: CPT | Performed by: INTERNAL MEDICINE

## 2021-06-19 PROCEDURE — 82728 ASSAY OF FERRITIN: CPT

## 2021-06-19 RX ADMIN — OXYCODONE 10 MG: 5 TABLET ORAL at 17:57

## 2021-06-19 RX ADMIN — CLONAZEPAM 0.5 MG: 0.5 TABLET ORAL at 08:59

## 2021-06-19 RX ADMIN — OXYCODONE 10 MG: 5 TABLET ORAL at 06:22

## 2021-06-19 RX ADMIN — APIXABAN 5 MG: 5 TABLET, FILM COATED ORAL at 17:58

## 2021-06-19 RX ADMIN — AMIODARONE HYDROCHLORIDE 200 MG: 200 TABLET ORAL at 17:58

## 2021-06-19 RX ADMIN — TAMSULOSIN HYDROCHLORIDE 0.4 MG: 0.4 CAPSULE ORAL at 08:59

## 2021-06-19 RX ADMIN — APIXABAN 5 MG: 5 TABLET, FILM COATED ORAL at 06:22

## 2021-06-19 RX ADMIN — PAROXETINE HYDROCHLORIDE 20 MG: 20 TABLET, FILM COATED ORAL at 08:59

## 2021-06-19 RX ADMIN — HYDROCORTISONE 5 MG: 5 TABLET ORAL at 14:45

## 2021-06-19 RX ADMIN — OMEPRAZOLE 40 MG: 20 CAPSULE, DELAYED RELEASE ORAL at 06:22

## 2021-06-19 RX ADMIN — HYDROCORTISONE 5 MG: 5 TABLET ORAL at 06:23

## 2021-06-19 RX ADMIN — ATORVASTATIN CALCIUM 40 MG: 40 TABLET, FILM COATED ORAL at 17:57

## 2021-06-19 RX ADMIN — Medication 1000 UNITS: at 06:22

## 2021-06-19 RX ADMIN — OXYCODONE 10 MG: 5 TABLET ORAL at 01:19

## 2021-06-19 RX ADMIN — LEVOTHYROXINE SODIUM 125 MCG: 0.12 TABLET ORAL at 06:22

## 2021-06-19 RX ADMIN — METOPROLOL TARTRATE 25 MG: 25 TABLET, FILM COATED ORAL at 12:41

## 2021-06-19 RX ADMIN — ZIPRASIDONE HYDROCHLORIDE 60 MG: 60 CAPSULE ORAL at 08:59

## 2021-06-19 RX ADMIN — OMEPRAZOLE 40 MG: 20 CAPSULE, DELAYED RELEASE ORAL at 17:57

## 2021-06-19 RX ADMIN — AMIODARONE HYDROCHLORIDE 200 MG: 200 TABLET ORAL at 06:22

## 2021-06-19 RX ADMIN — CLONAZEPAM 1 MG: 1 TABLET ORAL at 17:57

## 2021-06-19 RX ADMIN — OXYCODONE 10 MG: 5 TABLET ORAL at 20:06

## 2021-06-19 RX ADMIN — ZIPRASIDONE HYDROCHLORIDE 60 MG: 60 CAPSULE ORAL at 20:06

## 2021-06-19 RX ADMIN — OXYCODONE 10 MG: 5 TABLET ORAL at 12:41

## 2021-06-19 ASSESSMENT — ENCOUNTER SYMPTOMS
DIARRHEA: 1
VOMITING: 0
SHORTNESS OF BREATH: 0
FOCAL WEAKNESS: 0
PALPITATIONS: 0
FLANK PAIN: 0
DEPRESSION: 0
CONSTIPATION: 0
NECK PAIN: 0
BACK PAIN: 0
FEVER: 0
HEARTBURN: 0
MYALGIAS: 0
HALLUCINATIONS: 0
DIAPHORESIS: 0
CHILLS: 0
BLURRED VISION: 0
COUGH: 0
HEADACHES: 0
ABDOMINAL PAIN: 0
SENSORY CHANGE: 0
NAUSEA: 0
NERVOUS/ANXIOUS: 0
SORE THROAT: 0
SPEECH CHANGE: 0
WHEEZING: 0
DIZZINESS: 0
WEAKNESS: 1

## 2021-06-19 ASSESSMENT — PAIN DESCRIPTION - PAIN TYPE
TYPE: ACUTE PAIN
TYPE: SURGICAL PAIN;CHRONIC PAIN;ACUTE PAIN
TYPE: ACUTE PAIN
TYPE: SURGICAL PAIN;CHRONIC PAIN

## 2021-06-19 ASSESSMENT — FIBROSIS 4 INDEX: FIB4 SCORE: 1.53

## 2021-06-19 NOTE — PROGRESS NOTES
[facility] Medicine Daily Progress Note    Date of Service  6/19/2021    Chief Complaint  56 y.o. male admitted 2/9/2021 with Syncope.    Hospital Course  56-year-old male with history of atrial fibrillation, diabetes, adrenal insufficiency, depression and obesity presented 2/9 with syncope and bradycardia with hypotension. Patient was on diltiazem, digoxin and metoprolol which all were held; once, bradycardia had improved and resumed metoprolol and digoxin with later switched to amiodarone by cardiologist, Eliquis was started for anticoagulation.    He also had a possible history of adrenal insufficiency, he was already on Florinef and midodrine.  Midodrine was discontinued and he was started on cortisol. Also florinef was discontinued since volume overload.     Further work-up showed that he had gram-negative bacteremia as well as urinary tract infection and acute kidney injury.  He was initially placed on IV Zosyn, later changed to oral Bactrim to finish the course.      On admission, he also expressed suicidal ideation.  Psychiatry was consulted the case, he was placed on a legal hold.  He was noted to have severe depression.  He has been placed on medication the form of Geodon, Paxil, Klonopin.  He has a colostomy in place, which the patient believes contributes to his severe depression because of ostomy care. Legal hold though discontinued because pt did improve significantly once ostomy was reversed.     He had history of bowel perforation and splenic fluid collection requiring Segmental colectomy, colostomy creation, mobilization of splenic flexure, wound VAC placement, irrigation and debridement of flank wound on 11/10/2020.  Surgery was consulted on the case, and he underwent exploratory laparotomy and colostomy takedown and closure on 5/27/2021. Pt was followed by wound care post op with wound vac interval changes.    Patient lives by himself and is not able to take care of himself at this time, PT and OT  evaluated the patient and recommended SNF    Interval Problem Update  6/15- he is feeling well overall. He had two bowel movement. No diarrhea. He is volume overload. Stop IVF. Continue to monitor. Consider start dose of lasix in the future. Still remain tachycardic. To consider to increase amiodarone ?? Or add metoprolol. Pt very sensitive to BP meds    6/16- pt doing well. No more diarrhea from yesterday. Hold on stool softer. BP improving. Start low dose lasix. Continue to monitor. More staples this week.     6/17- doing very well. No more diarrhea. Electrolytes replaced. Continue to monitor     6/18- doing well. Diarrhea subsided. Some more staples on his wound today. Hgb is dropping some though.  Continue to monitor    6/19- no event. Some loose stool today. Abdominal pain durable. BP stable.     Consultants/Specialty  Cardiology  Psychiatry  Surgery    Code Status  Full Code    Disposition  SNF placement pending   Patient needs help for daily activities  Patient is medically cleared      Review of Systems  Review of Systems   Constitutional: Positive for malaise/fatigue. Negative for chills, diaphoresis and fever.   HENT: Negative for congestion, hearing loss and sore throat.    Eyes: Negative for blurred vision.   Respiratory: Negative for cough, shortness of breath and wheezing.    Cardiovascular: Negative for chest pain, palpitations and leg swelling.   Gastrointestinal: Positive for diarrhea. Negative for abdominal pain, constipation, heartburn, nausea and vomiting.   Genitourinary: Negative for dysuria, flank pain and hematuria.   Musculoskeletal: Negative for back pain, joint pain, myalgias and neck pain.   Skin: Negative for rash.   Neurological: Positive for weakness. Negative for dizziness, sensory change, speech change, focal weakness and headaches.   Psychiatric/Behavioral: Negative for depression, hallucinations and suicidal ideas. The patient is not nervous/anxious.       Physical Exam  Temp:   [36.1 °C (97 °F)-37.1 °C (98.8 °F)] 37.1 °C (98.8 °F)  Pulse:  [] 97  Resp:  [16] 16  BP: (102-117)/(66-80) 107/66  SpO2:  [92 %-94 %] 93 %    Physical Exam  Vitals and nursing note reviewed.   Constitutional:       Appearance: He is obese.   HENT:      Head: Normocephalic and atraumatic.      Nose: No congestion.      Mouth/Throat:      Mouth: Mucous membranes are moist.   Eyes:      Extraocular Movements: Extraocular movements intact.      Conjunctiva/sclera: Conjunctivae normal.      Pupils: Pupils are equal, round, and reactive to light.   Cardiovascular:      Rate and Rhythm: Normal rate. Rhythm irregular.   Pulmonary:      Effort: Pulmonary effort is normal. No respiratory distress.      Breath sounds: No wheezing or rales.   Abdominal:      General: There is no distension.      Tenderness: There is no abdominal tenderness. There is no guarding or rebound.      Comments: Wound vac in place    Musculoskeletal:      Cervical back: No muscular tenderness.      Right lower leg: No edema.      Left lower leg: No edema.   Skin:     General: Skin is warm and dry.      Findings: No bruising.   Neurological:      General: No focal deficit present.      Mental Status: He is alert and oriented to person, place, and time.      Cranial Nerves: No cranial nerve deficit.      Motor: No weakness.   Psychiatric:         Mood and Affect: Mood is not anxious.         Speech: Speech normal.         Thought Content: Thought content is not paranoid or delusional. Thought content does not include homicidal or suicidal ideation. Thought content does not include homicidal or suicidal plan.         Judgment: Judgment is not impulsive.       Fluids    Intake/Output Summary (Last 24 hours) at 6/19/2021 1109  Last data filed at 6/19/2021 0617  Gross per 24 hour   Intake 600 ml   Output 1550 ml   Net -950 ml       Laboratory  Recent Labs     06/17/21  0125 06/18/21  0230 06/19/21  0240   WBC 9.2 9.2 8.6   RBC 3.70* 3.42* 3.47*    HEMOGLOBIN 10.4* 9.9* 9.8*   HEMATOCRIT 33.0* 30.3* 30.8*   MCV 89.2 88.6 88.8   MCH 28.1 28.9 28.2   MCHC 31.5* 32.7* 31.8*   RDW 52.0* 51.7* 51.8*   PLATELETCT 316 261 274   MPV 11.1 10.7 11.1     Recent Labs     06/17/21  0125 06/18/21  0230 06/19/21  0240   SODIUM 137 136 137   POTASSIUM 3.1* 3.4* 3.7   CHLORIDE 107 106 106   CO2 21 23 23   GLUCOSE 110* 99 99   BUN 18 22 22   CREATININE 1.50* 1.72* 1.79*   CALCIUM 8.3* 8.2* 8.0*                   Imaging  US-RENAL   Final Result      1.  Low-level echogenic debris in the urinary bladder is noted which could indicate UTI or blood within the bladder.      2.  No hydronephrosis.      IR-MIDLINE CATHETER INSERTION WO GUIDANCE > AGE 3   Final Result                  Ultrasound-guided midline placement performed by qualified nursing staff    as above.          EC-ECHOCARDIOGRAM COMPLETE W/ CONT   Final Result      DX-CHEST-PORTABLE (1 VIEW)   Final Result      1.  Minor asymmetric interstitial opacity in the right lower lobe which could represent minor interstitial edema, pneumonia, or fibrotic change.      2.  No left lung consolidation.      3.  No evidence of congestive failure.      JD-SYYZXMV-0 VIEW   Final Result         No specific finding to suggest small bowel obstruction.      RI-FPMZPYB-9 VIEW   Final Result         No significant interval change.      DX-CHEST-LIMITED (1 VIEW)   Final Result         Diffuse interstitial prominence could relate to mild pulmonary edema or atypical infection      II-DXLALNB-3 VIEW   Final Result      Increased colonic gas without definite bowel obstruction.      US-RENAL   Final Result      No evidence of hydronephrosis.      Lobulated kidneys bilaterally.      CT-ABDOMEN-PELVIS WITH   Final Result      1.  There is no evidence of small bowel obstruction.   2.  There is a left lower quadrant ostomy.   3.  There is fluid distention of the colon distal to the surgical material in the left mid descending colon extending all  the way to the rectum. There is no pneumatosis or free air.   4.  There is cholelithiasis without biliary dilatation.   5.  There has been interval removal of the drainage catheter anterior to the spleen with minimal hypodense area in the anterior spleen again noted. There is no new fluid collection.      IR-US GUIDED PIV   Final Result    Ultrasound-guided PERIPHERAL IV INSERTION performed by    qualified nursing staff as above.      EC-ECHOCARDIOGRAM COMPLETE W/O CONT   Final Result      DX-LUMBAR SPINE-2 OR 3 VIEWS   Final Result      Moderate compression deformity of T11 is new compared to 2018.      Mild wedge deformity of T12 is unchanged.      Degenerative changes including facet arthropathy.      Mild retrolisthesis of L5 on S1 and L3 on L4.              Assessment/Plan  * Debility- (present on admission)  Assessment & Plan  PT and OT; SNF  vit d level low 5/29- start replacement   Patient has generalized weakness  Poor prognosis    Normocytic anemia- (present on admission)  Assessment & Plan  6/18- hgb continues slowly to drop  Pt on chronic steroid which increase risk for stress ulcers   Start omeprazole 40 mg BID   Iron studies, TSH/B/FOLAte, occult studies to follow     Leukocytosis- (present on admission)  Assessment & Plan  Around 12-13  No fever or chills  Continue his medications  Consider repeat CT scan for abdomen if needed  6/18- normal. Continue to monitor. Consider abdomen CT if signs of infection      Obstructive uropathy- (present on admission)  Assessment & Plan  Bladder scan and straight cath prn    Permanent atrial fibrillation (HCC)- (present on admission)  Assessment & Plan  6/7- a-fib persistent. Cardiology consulted again to start amiodarone   Stop digoxin high risk toxicity with SRUTHI   Stop metoprolol since hypotensive   Continue eliquis   6/18- still persistent a-fib but better rate controled. Continue amiodarone. Continue to monitor     Chronic venous insufficiency of lower extremity-  (present on admission)  Assessment & Plan  compression stockings.    Chronic heart failure with preserved ejection fraction (HCC)- (present on admission)  Assessment & Plan  Toprol on hold due to low BP and orthostatic hypo  C/w Atorvastatin  Monitor fluid status and Cr  6/18- tolerating  lasix. Consider to add BB later   6/19- weight is up, but he looks less overload. Will start metoprolol low dose today     Type 2 diabetes mellitus with hyperglycemia, with long-term current use of insulin ([facility])- (present on admission)  Assessment & Plan  Follow bmp  Currently not requiring Insulin    Major depressive disorder, recurrent, severe with psychotic features ([facility])- (present on admission)  Assessment & Plan  Geodon, Paxil, Klonopin  Psychiatry following     Colostomy present ([facility])- (present on admission)  Assessment & Plan  Exploratory laparotomy with colostomy takedown and closure 5/27/2021  Segmental colectomy, colostomy creation, mobilization of splenic flexure, wound VAC placement, irrigation and debridement of flank wound on 11/10/2020.  Improving on his leukocytosis  No fever or chills and wound VAC in place   6/15- off antibiotic   working for SNF  6/16- plan for more staples and wash out after three days   6/19- doing well. Continues to heal. Possible wash out ?? Continue to monitor. Pictures reviewed         Hypothyroidism- (present on admission)  Assessment & Plan  Increased Levothyroxine to 125 mcg  Check TSH on 6/29/2021    SRUTHI (acute kidney injury) ([facility])- (present on admission)  Assessment & Plan  6/1- resolved   FeNa showed prerenal  6/18- remain stable on lasix. Continue to monitor   6/19- stable    Adrenal insufficiency (HCC)- (present on admission)  Assessment & Plan  Likely  Redcued corticef 5 mg BID 6/13  And then corticef 5 mg daily on 7/4  He need endo evaluation as outpatient    Suicidal ideation- (present on admission)  Assessment & Plan  6/4- legal hold discontinued.   Possible SNF placement      Orthostatic hypotension- (present on admission)  Assessment & Plan  Stop florinef. Consider midodrine once heart rate improving   Florinef increase risk of water retention, poor wound healing and he is already on hydrocortisone   Stop metoprolol or other BP meds  6/16- improving, he is off midodrine/florinef. Start low dose of lasix. Continue to monitor   6/17- tolerating lasix well   6/18- tolerating lasix. Doing well.       Mixed hyperlipidemia- (present on admission)  Assessment & Plan  Atorvastatin.     Lower limb amputation, great toe (HCC)- (present on admission)  Assessment & Plan  Monitor  No signs of infection     Hypomagnesemia- (present on admission)  Assessment & Plan  Monitor and replace as needed     Class 2 severe obesity with serious comorbidity and body mass index (BMI) of 35.0 to 35.9 in adult (HCC)- (present on admission)  Assessment & Plan  Body mass index is 35.19 kg/m².    Hypokalemia- (present on admission)  Assessment & Plan  Monitor and replete as needed       VTE prophylaxis: eliquis       Interventions to be considered in all patients in order to minimize the risk of delirium.   -do not disturb patient (vitals or lab draws) between the hours of 10 PM and 6 AM.  -ideally the patient should not sleep during the day and we should avoid day time naps.   -up in chair for meals  -ambulate at least three times daily, as able  -watch for constipation  -timed voiding - ask patient is she would like to go to the bathroom q 2-3 hours, except during the do not disturb hours.   -remove all necessary lines (central lines, peripheral IVs, feeding tubes, frye catheters)  -unless patient has shown harm to self or others I would recommend against use of restraints - either chemical or physical (antipsychotics)   -minimize polypharmacy, do not dose medication during sleep hours

## 2021-06-19 NOTE — PROGRESS NOTES
Report received. Assessment completed.  Pt is A&O x4. Pt on room air.   Medicating for pain PRN per MAR  Denies nausea.   B/L LE +numbness, +tingling.  Midline inc with vac in place  interdry changed to Pannus for moisture  + loose incontinent BM today.  +void.  Tolerating diet.   Pt up with max assist to edge of bed, Q2 Turns in place  Call light and belongings within reach. All needs met at this time. Fall Precautions and hourly rounding in place

## 2021-06-19 NOTE — CARE PLAN
Problem: Bowel Elimination  Goal: Establish and maintain regular bowel function  Outcome: Not Progressing     Problem: Mobility  Goal: Patient's capacity to carry out activities will improve  Outcome: Not Progressing     Problem: Respiratory  Goal: Patient will achieve/maintain optimum respiratory ventilation and gas exchange  Outcome: Progressing     Problem: Fall Risk  Goal: Patient will remain free from falls  Outcome: Progressing     Problem: Pain - Standard  Goal: Alleviation of pain or a reduction in pain to the patient’s comfort goal  Outcome: Progressing     The patient is Watcher - Medium risk of patient condition declining or worsening    Shift Goals  Clinical Goals: pain control, mobility  Patient Goals: rest, comfort and pain control  Family Goals: n/a    Progress made toward(s) clinical / shift goals:  partially. Pain under control using PRN meds. Pt is on room air and has remained free of injuries or falls.    Patient is not progressing towards the following goals: mobility and formed bowel movements. Pt has yet to ambulate. Pt is still having loose Bm's. Stool sample was negative.

## 2021-06-19 NOTE — PROGRESS NOTES
2 RN skin check completed with Trupti  Left buttock red spot slow to swetha  Sacrum, buttocks, groin, posterior and anterior thighs and pannus Moisture and redness noted. Nystatin, barrier paste and interdry in use  Right abdomen wound vac, CDI  B/L heels and elbows intact with preventative mepilexes   B/L LE skin dryness and flaky, lotion applied  All other skin intact  Q2 turns in place  Low airloss mattress

## 2021-06-19 NOTE — CONSULTS
"PSYCHOLOGICAL FOLLOW-UP:  Reason for admission: Syncope and collapse [R55]  Suicidal ideation [R45.851]  Sepsis due to Klebsiella pneumoniae (HCC) [A41.4]  Length of Visit: 30min    Legal status: Legal Status: Voluntary    Chief Complaint: \"I was able to handle it by myself.\"    HPI: Met with the patient for brief individual psychotherapy. Patient presented with a euthymic affect and reported a \"good\" mood. Session focused on how the patient has been using his skill of thought diffusion to cope with anxious thoughts and mood congruent auditory hallucinations. He shared how he was able to choose to ignore the voices when he was anxious about his health. Patient reported feeling proud of himself for his achievement. Praised patient for practicing his skill and encouraged continued use of the skill. Patient continues to share his desire to live and physically recover. He focused today on not wanting to be \"bed bound\" and stated his intention to follow the treatment plan for his physical recovery.     Psychiatric Examination:  Vitals: Blood pressure 107/66, pulse 97, temperature 37.1 °C (98.8 °F), temperature source Temporal, resp. rate 16, height 1.829 m (6'), weight (!) 149 kg (327 lb 6.1 oz), SpO2 93 %.  Musculoskeletal: normal psychomotor activity, no tics or unusual mannerisms noted  Appearance and Eye Contact: appropriate dress and grooming. Behavior is calm, cooperative,  appropriate eye contact  Attention/Alertness: Alert  Thought Process: Linear, Logical and Goal Directed    Thought Content: No psychotic processes noted  Speech: Clear with normal rate and rhythm  Mood: \"good\"            Affect: euthymic         SI/HI: Denies    Memory: Recent and remote memory appear intact    Orientation: alert, oriented to person, place and time  Insight into symptoms: good  Judgement into symptoms:good    ASSESSMENT:     DSM5 Diagnostic Considerations:   Unspecified Personality Disorder  Major Depressive Disorder, recurrent, " severe, with psychotic features      PLAN:  Legal status: Voluntary  Anticipate F/U when able. Patient is aware.   Records reviewed: yes  Will continue to follow when able  Thank you for the consult.    Madhuri Fernandez, Ph.D.

## 2021-06-19 NOTE — CARE PLAN
Problem: Knowledge Deficit - Standard  Goal: Patient and family/care givers will demonstrate understanding of plan of care, disease process/condition, diagnostic tests and medications  Outcome: Progressing   The patient is Stable - Low risk of patient condition declining or worsening    Shift Goals  Clinical Goals: pain control  Patient Goals: rest, comfort and pain control  Family Goals: n/a    Progress made toward(s) clinical / shift goals:  Medicating for pain PRN per MAR. Provided therapeutic listening and distraction for sadness.    Patient is not progressing towards the following goals:      Problem: Depression  Goal: Patient and family/caregiver will verbalize accurate information about at least two of the possible causes of depression, three-four of the signs and symptoms of depression  Outcome: Not Progressing     Problem: Pain - Standard  Goal: Alleviation of pain or a reduction in pain to the patient’s comfort goal  Outcome: Not Progressing

## 2021-06-19 NOTE — PROGRESS NOTES
2 RN skin check complete  Confirmed pressure ulcers found: n/a  New potential pressure ulcers noted: Left buttock redness, slow to swetha  Sacrum, groin, posterior thighs and pannus - excoriation with blanchable redness  Right abdomen midline incision with Veraflo wound vac  Bilat. Ears, heels and elbows intact and blanching; mepilexes to bilat heels and elbows  B/L LE skin dryness and flaky  All other skin intact  Q2 turns in place    Devices in place: Veraflo wound vac  Wound care in place.  The follow interventions in place: pt is on a low air loss mattress. In use: mepilex, barrier paste and cream, barrier wipes, nystatin powder, q 2 turns, pillows, and interdry.

## 2021-06-19 NOTE — PROGRESS NOTES
Assumed patient care.  Pt is A&O x4. Pt on room air  Denies N/V, SOB or chest pain.              Midline abd inc with wound vac with varaflo in place.  PRN pain meds in use.  BLE numbness and tingling - baseline per pt due to neuropathy  Last BM 6/18. +flatus, +BM, +void  Tolerating diet.  Pt can ambulate with max assist; declined ambulation at this time. Education provided regarding need to ambulate to facilitate increase strength and prevent further deconditioning  SCDs to BLE refused due to neuropathic pain. Eliquis for VTE for hx of a-fib.

## 2021-06-20 PROCEDURE — 700102 HCHG RX REV CODE 250 W/ 637 OVERRIDE(OP): Performed by: INTERNAL MEDICINE

## 2021-06-20 PROCEDURE — A9270 NON-COVERED ITEM OR SERVICE: HCPCS | Performed by: INTERNAL MEDICINE

## 2021-06-20 PROCEDURE — 99231 SBSQ HOSP IP/OBS SF/LOW 25: CPT | Performed by: INTERNAL MEDICINE

## 2021-06-20 PROCEDURE — 770001 HCHG ROOM/CARE - MED/SURG/GYN PRIV*

## 2021-06-20 RX ADMIN — OXYCODONE 10 MG: 5 TABLET ORAL at 08:06

## 2021-06-20 RX ADMIN — HYDROCORTISONE 5 MG: 5 TABLET ORAL at 05:27

## 2021-06-20 RX ADMIN — ATORVASTATIN CALCIUM 40 MG: 40 TABLET, FILM COATED ORAL at 17:23

## 2021-06-20 RX ADMIN — HYDROCORTISONE 5 MG: 5 TABLET ORAL at 16:26

## 2021-06-20 RX ADMIN — APIXABAN 5 MG: 5 TABLET, FILM COATED ORAL at 05:24

## 2021-06-20 RX ADMIN — TAMSULOSIN HYDROCHLORIDE 0.4 MG: 0.4 CAPSULE ORAL at 10:32

## 2021-06-20 RX ADMIN — OXYCODONE 5 MG: 5 TABLET ORAL at 12:28

## 2021-06-20 RX ADMIN — OXYCODONE 5 MG: 5 TABLET ORAL at 20:47

## 2021-06-20 RX ADMIN — APIXABAN 5 MG: 5 TABLET, FILM COATED ORAL at 17:23

## 2021-06-20 RX ADMIN — CLONAZEPAM 0.5 MG: 0.5 TABLET ORAL at 08:04

## 2021-06-20 RX ADMIN — AMIODARONE HYDROCHLORIDE 200 MG: 200 TABLET ORAL at 17:22

## 2021-06-20 RX ADMIN — METOPROLOL TARTRATE 25 MG: 25 TABLET, FILM COATED ORAL at 17:23

## 2021-06-20 RX ADMIN — OMEPRAZOLE 40 MG: 20 CAPSULE, DELAYED RELEASE ORAL at 17:22

## 2021-06-20 RX ADMIN — METOPROLOL TARTRATE 25 MG: 25 TABLET, FILM COATED ORAL at 05:27

## 2021-06-20 RX ADMIN — PAROXETINE HYDROCHLORIDE 20 MG: 20 TABLET, FILM COATED ORAL at 08:04

## 2021-06-20 RX ADMIN — ZIPRASIDONE HYDROCHLORIDE 60 MG: 60 CAPSULE ORAL at 20:47

## 2021-06-20 RX ADMIN — ZIPRASIDONE HYDROCHLORIDE 60 MG: 60 CAPSULE ORAL at 08:04

## 2021-06-20 RX ADMIN — LEVOTHYROXINE SODIUM 125 MCG: 0.12 TABLET ORAL at 05:24

## 2021-06-20 RX ADMIN — Medication 1000 UNITS: at 05:24

## 2021-06-20 RX ADMIN — OMEPRAZOLE 40 MG: 20 CAPSULE, DELAYED RELEASE ORAL at 05:24

## 2021-06-20 RX ADMIN — CLONAZEPAM 1 MG: 1 TABLET ORAL at 17:23

## 2021-06-20 RX ADMIN — FUROSEMIDE 20 MG: 20 TABLET ORAL at 05:24

## 2021-06-20 RX ADMIN — OXYCODONE 5 MG: 5 TABLET ORAL at 17:22

## 2021-06-20 ASSESSMENT — PAIN DESCRIPTION - PAIN TYPE
TYPE: SURGICAL PAIN
TYPE: SURGICAL PAIN
TYPE: ACUTE PAIN
TYPE: SURGICAL PAIN

## 2021-06-20 ASSESSMENT — ENCOUNTER SYMPTOMS
CHILLS: 0
WHEEZING: 0
DEPRESSION: 0
WEAKNESS: 1
SENSORY CHANGE: 0
VOMITING: 0
NERVOUS/ANXIOUS: 0
COUGH: 0
BACK PAIN: 0
FLANK PAIN: 0
DIAPHORESIS: 0
BLURRED VISION: 0
PALPITATIONS: 0
SORE THROAT: 0
DIARRHEA: 1
HALLUCINATIONS: 0
FEVER: 0
MYALGIAS: 0
HEARTBURN: 0
CONSTIPATION: 0
NAUSEA: 0
DIZZINESS: 0
NECK PAIN: 0
FOCAL WEAKNESS: 0
SHORTNESS OF BREATH: 0
HEADACHES: 0
SPEECH CHANGE: 0
ABDOMINAL PAIN: 0

## 2021-06-20 NOTE — CARE PLAN
The patient is Stable - Low risk of patient condition declining or worsening    Shift Goals  Clinical Goals: pain control; EOB      Progress made toward(s) clinical / shift goals:  PRN pain medications used for pain control; patient to EOB x2    Patient is not progressing towards the following goals:

## 2021-06-20 NOTE — PROGRESS NOTES
[facility] Medicine Daily Progress Note    Date of Service  6/20/2021    Chief Complaint  56 y.o. male admitted 2/9/2021 with Syncope.    Hospital Course  56-year-old male with history of atrial fibrillation, diabetes, adrenal insufficiency, depression and obesity presented 2/9 with syncope and bradycardia with hypotension. Patient was on diltiazem, digoxin and metoprolol which all were held; once, bradycardia had improved and resumed metoprolol and digoxin with later switched to amiodarone by cardiologist, Eliquis was started for anticoagulation.    He also had a possible history of adrenal insufficiency, he was already on Florinef and midodrine.  Midodrine was discontinued and he was started on cortisol. Also florinef was discontinued since volume overload.     Further work-up showed that he had gram-negative bacteremia as well as urinary tract infection and acute kidney injury.  He was initially placed on IV Zosyn, later changed to oral Bactrim to finish the course.      On admission, he also expressed suicidal ideation.  Psychiatry was consulted the case, he was placed on a legal hold.  He was noted to have severe depression.  He has been placed on medication the form of Geodon, Paxil, Klonopin.  He has a colostomy in place, which the patient believes contributes to his severe depression because of ostomy care. Legal hold though discontinued because pt did improve significantly once ostomy was reversed.     He had history of bowel perforation and splenic fluid collection requiring Segmental colectomy, colostomy creation, mobilization of splenic flexure, wound VAC placement, irrigation and debridement of flank wound on 11/10/2020.  Surgery was consulted on the case, and he underwent exploratory laparotomy and colostomy takedown and closure on 5/27/2021. Pt was followed by wound care post op with wound vac interval changes.    Patient lives by himself and is not able to take care of himself at this time, PT and OT  evaluated the patient and recommended SNF    Interval Problem Update  6/15- he is feeling well overall. He had two bowel movement. No diarrhea. He is volume overload. Stop IVF. Continue to monitor. Consider start dose of lasix in the future. Still remain tachycardic. To consider to increase amiodarone ?? Or add metoprolol. Pt very sensitive to BP meds    6/16- pt doing well. No more diarrhea from yesterday. Hold on stool softer. BP improving. Start low dose lasix. Continue to monitor. More staples this week.     6/17- doing very well. No more diarrhea. Electrolytes replaced. Continue to monitor     6/18- doing well. Diarrhea subsided. Some more staples on his wound today. Hgb is dropping some though.  Continue to monitor    6/19- no event. Some loose stool today. Abdominal pain durable. BP stable.     6/20- he did have two loose stool. Feeling well overall. No other event. Continue same treatment, HR better controled.     Consultants/Specialty  Cardiology  Psychiatry  Surgery    Code Status  Full Code    Disposition  SNF placement pending   Patient needs help for daily activities  Patient is medically cleared      Review of Systems  Review of Systems   Constitutional: Positive for malaise/fatigue. Negative for chills, diaphoresis and fever.   HENT: Negative for congestion, hearing loss and sore throat.    Eyes: Negative for blurred vision.   Respiratory: Negative for cough, shortness of breath and wheezing.    Cardiovascular: Negative for chest pain, palpitations and leg swelling.   Gastrointestinal: Positive for diarrhea. Negative for abdominal pain, constipation, heartburn, nausea and vomiting.   Genitourinary: Negative for dysuria, flank pain and hematuria.   Musculoskeletal: Negative for back pain, joint pain, myalgias and neck pain.   Skin: Negative for rash.   Neurological: Positive for weakness. Negative for dizziness, sensory change, speech change, focal weakness and headaches.   Psychiatric/Behavioral:  Negative for depression, hallucinations and suicidal ideas. The patient is not nervous/anxious.       Physical Exam  Temp:  [36.2 °C (97.2 °F)-36.8 °C (98.3 °F)] 36.7 °C (98 °F)  Pulse:  [] 74  Resp:  [17-18] 18  BP: ()/(56-74) 111/74  SpO2:  [92 %-93 %] 93 %    Physical Exam  Vitals and nursing note reviewed.   Constitutional:       Appearance: He is obese.   HENT:      Head: Normocephalic and atraumatic.      Nose: No congestion.      Mouth/Throat:      Mouth: Mucous membranes are moist.   Eyes:      Extraocular Movements: Extraocular movements intact.      Conjunctiva/sclera: Conjunctivae normal.      Pupils: Pupils are equal, round, and reactive to light.   Cardiovascular:      Rate and Rhythm: Normal rate. Rhythm irregular.   Pulmonary:      Effort: Pulmonary effort is normal. No respiratory distress.      Breath sounds: No wheezing or rales.   Abdominal:      General: There is no distension.      Tenderness: There is no abdominal tenderness. There is no guarding or rebound.      Comments: Wound vac in place    Musculoskeletal:      Cervical back: No muscular tenderness.      Right lower leg: No edema.      Left lower leg: No edema.   Skin:     General: Skin is warm and dry.      Findings: No bruising.   Neurological:      General: No focal deficit present.      Mental Status: He is alert and oriented to person, place, and time.      Cranial Nerves: No cranial nerve deficit.      Motor: No weakness.   Psychiatric:         Mood and Affect: Mood is not anxious.         Speech: Speech normal.         Thought Content: Thought content is not paranoid or delusional. Thought content does not include homicidal or suicidal ideation. Thought content does not include homicidal or suicidal plan.         Judgment: Judgment is not impulsive.       Fluids    Intake/Output Summary (Last 24 hours) at 6/20/2021 1445  Last data filed at 6/20/2021 1200  Gross per 24 hour   Intake 480 ml   Output 2095 ml   Net -1615 ml        Laboratory  Recent Labs     06/18/21  0230 06/19/21  0240   WBC 9.2 8.6   RBC 3.42* 3.47*   HEMOGLOBIN 9.9* 9.8*   HEMATOCRIT 30.3* 30.8*   MCV 88.6 88.8   MCH 28.9 28.2   MCHC 32.7* 31.8*   RDW 51.7* 51.8*   PLATELETCT 261 274   MPV 10.7 11.1     Recent Labs     06/18/21  0230 06/19/21  0240   SODIUM 136 137   POTASSIUM 3.4* 3.7   CHLORIDE 106 106   CO2 23 23   GLUCOSE 99 99   BUN 22 22   CREATININE 1.72* 1.79*   CALCIUM 8.2* 8.0*                   Imaging  US-RENAL   Final Result      1.  Low-level echogenic debris in the urinary bladder is noted which could indicate UTI or blood within the bladder.      2.  No hydronephrosis.      IR-MIDLINE CATHETER INSERTION WO GUIDANCE > AGE 3   Final Result                  Ultrasound-guided midline placement performed by qualified nursing staff    as above.          EC-ECHOCARDIOGRAM COMPLETE W/ CONT   Final Result      DX-CHEST-PORTABLE (1 VIEW)   Final Result      1.  Minor asymmetric interstitial opacity in the right lower lobe which could represent minor interstitial edema, pneumonia, or fibrotic change.      2.  No left lung consolidation.      3.  No evidence of congestive failure.      CP-OGXWGNV-2 VIEW   Final Result         No specific finding to suggest small bowel obstruction.      SH-[id number]-6 VIEW   Final Result         No significant interval change.      DX-CHEST-LIMITED (1 VIEW)   Final Result         Diffuse interstitial prominence could relate to mild pulmonary edema or atypical infection      EB-[id number]-9 VIEW   Final Result      Increased colonic gas without definite bowel obstruction.      US-RENAL   Final Result      No evidence of hydronephrosis.      Lobulated kidneys bilaterally.      CT-ABDOMEN-PELVIS WITH   Final Result      1.  There is no evidence of small bowel obstruction.   2.  There is a left lower quadrant ostomy.   3.  There is fluid distention of the colon distal to the surgical material in the left mid descending colon extending all  the way to the rectum. There is no pneumatosis or free air.   4.  There is cholelithiasis without biliary dilatation.   5.  There has been interval removal of the drainage catheter anterior to the spleen with minimal hypodense area in the anterior spleen again noted. There is no new fluid collection.      IR-US GUIDED PIV   Final Result    Ultrasound-guided PERIPHERAL IV INSERTION performed by    qualified nursing staff as above.      EC-ECHOCARDIOGRAM COMPLETE W/O CONT   Final Result      DX-LUMBAR SPINE-2 OR 3 VIEWS   Final Result      Moderate compression deformity of T11 is new compared to 2018.      Mild wedge deformity of T12 is unchanged.      Degenerative changes including facet arthropathy.      Mild retrolisthesis of L5 on S1 and L3 on L4.              Assessment/Plan  * Debility- (present on admission)  Assessment & Plan  PT and OT; SNF  vit d level low 5/29- start replacement   Patient has generalized weakness  Poor prognosis    Normocytic anemia- (present on admission)  Assessment & Plan  6/18- hgb continues slowly to drop  Pt on chronic steroid which increase risk for stress ulcers   Start omeprazole 40 mg BID   Iron studies, TSH/B/FOLAte, occult studies to follow     Leukocytosis- (present on admission)  Assessment & Plan  Around 12-13  No fever or chills  Continue his medications  Consider repeat CT scan for abdomen if needed  6/18- normal. Continue to monitor. Consider abdomen CT if signs of infection      Obstructive uropathy- (present on admission)  Assessment & Plan  Bladder scan and straight cath prn    Permanent atrial fibrillation (HCC)- (present on admission)  Assessment & Plan  6/7- a-fib persistent. Cardiology consulted again to start amiodarone   Stop digoxin high risk toxicity with SRUTHI   Continue eliquis   6/20- better. Metoprolol restarted 6/19. Continue to monitor     Chronic venous insufficiency of lower extremity- (present on admission)  Assessment & Plan  compression  stockings.    Chronic heart failure with preserved ejection fraction (HCC)- (present on admission)  Assessment & Plan  Toprol was held due to low BP and orthostatic hypo  C/w Atorvastatin  Monitor fluid status and Cr  6/16- restarted lasix  6/19- restarted metoprolol  Continue to monitor I&O       Type 2 diabetes mellitus with hyperglycemia, with long-term current use of insulin (HCC)- (present on admission)  Assessment & Plan  Follow bmp  Currently not requiring Insulin    Major depressive disorder, recurrent, severe with psychotic features ([facility])- (present on admission)  Assessment & Plan  Geodon, Paxil, Klonopin  Psychiatry following     Colostomy present (HCC)- (present on admission)  Assessment & Plan  Exploratory laparotomy with colostomy takedown and closure 5/27/2021  Segmental colectomy, colostomy creation, mobilization of splenic flexure, wound VAC placement, irrigation and debridement of flank wound on 11/10/2020.  Improving on his leukocytosis  No fever or chills and wound VAC in place   6/15- off antibiotic   working for SNF  6/16- plan for more staples and wash out after three days   6/19- doing well. Continues to heal. Possible wash out ?? Continue to monitor. Pictures reviewed         Hypothyroidism- (present on admission)  Assessment & Plan  Increased Levothyroxine to 125 mcg  Check TSH on 6/29/2021    SRUTHI (acute kidney injury) ([facility])- (present on admission)  Assessment & Plan  6/1- resolved   FeNa showed prerenal  6/18- remain stable on lasix. Continue to monitor   6/19- stable    Adrenal insufficiency ([facility])- (present on admission)  Assessment & Plan  Likely  Redcued corticef 5 mg BID 6/13  And then corticef 5 mg daily on 7/4  He need endo evaluation as outpatient    Suicidal ideation- (present on admission)  Assessment & Plan  6/4- legal hold discontinued.   Possible SNF placement     Orthostatic hypotension- (present on admission)  Assessment & Plan  Stop florinef.  Florinef increase risk of water  retention, poor wound healing and he is already on hydrocortisone   6/20-pt was started on lasix and metoprolol. He is tolerating them well. BP stable off florinef . No need for midodrine     Mixed hyperlipidemia- (present on admission)  Assessment & Plan  Atorvastatin.     Lower limb amputation, great toe (HCC)- (present on admission)  Assessment & Plan  Monitor  No signs of infection     Hypomagnesemia- (present on admission)  Assessment & Plan  Monitor and replace as needed     Class 2 severe obesity with serious comorbidity and body mass index (BMI) of 35.0 to 35.9 in adult (HCC)- (present on admission)  Assessment & Plan  Body mass index is 35.19 kg/m².    Hypokalemia- (present on admission)  Assessment & Plan  Monitor and replete as needed       VTE prophylaxis: eliquis       Interventions to be considered in all patients in order to minimize the risk of delirium.   -do not disturb patient (vitals or lab draws) between the hours of 10 PM and 6 AM.  -ideally the patient should not sleep during the day and we should avoid day time naps.   -up in chair for meals  -ambulate at least three times daily, as able  -watch for constipation  -timed voiding - ask patient is she would like to go to the bathroom q 2-3 hours, except during the do not disturb hours.   -remove all necessary lines (central lines, peripheral IVs, feeding tubes, frye catheters)  -unless patient has shown harm to self or others I would recommend against use of restraints - either chemical or physical (antipsychotics)   -minimize polypharmacy, do not dose medication during sleep hours

## 2021-06-20 NOTE — PROGRESS NOTES
Received report from previous shift RN  Assessment complete.  A&O x 4. Patient calls appropriately.  Patient turns with maximum assist. Bed alarm on.   Patient has 7/10 pain. Pain managed with prescribed medications.  Denies N&V. Tolerating low fiber (GI soft) diet.  Surgical site with wound vac in place, CDI, no leaks noted.  + void, + flatus, last BM 6/19.  Patient denies SOB.    Patient sitting in bed. Review plan with of care with patient. Call light and personal belongings with in reach. Hourly rounding in place. All needs met at this time.

## 2021-06-20 NOTE — PROGRESS NOTES
Bedside report received. Assessment completed.  Pt is A&O x4. Pt on room air.   Medicating for pain PRN per MAR Pain 8/10  Denies nausea.  BLE numbness/tingling.  Skin MLI w/ vac in place, old great toe amputation, sacral/testicle redness; interdry used in pannus   LDA RUE midline   Last BM 6/20/21 . +flatus,   +void; intermittent incontinence  Low fiber, hi carb diet. Tolerates well.   Pt max assist.  Call light and belongings within reach. All needs met at this time. Fall Precautions and hourly rounding in place.

## 2021-06-20 NOTE — CARE PLAN
Problem: Knowledge Deficit - Standard  Goal: Patient and family/care givers will demonstrate understanding of plan of care, disease process/condition, diagnostic tests and medications  Outcome: Progressing     Problem: Respiratory  Goal: Patient will achieve/maintain optimum respiratory ventilation and gas exchange  Outcome: Progressing     Problem: Bowel Elimination  Goal: Establish and maintain regular bowel function  Outcome: Progressing     Problem: Pain - Standard  Goal: Alleviation of pain or a reduction in pain to the patient’s comfort goal  Outcome: Progressing     The patient is Stable - Low risk of patient condition declining or worsening    Shift Goals  Clinical Goals: pain control, skin integrity, rest  Patient Goals: rest   Family Goals: n/a    Progress made toward(s) clinical / shift goals: Q2 turns in place, low air loss bed, barrier cream/wipes used to maintain skin integrity and prevent skin breakdown. Hourly rounding in place.     Patient is not progressing towards the following goals:

## 2021-06-21 LAB
ALBUMIN SERPL BCP-MCNC: 2.3 G/DL (ref 3.2–4.9)
ALBUMIN/GLOB SERPL: 0.7 G/DL
ALP SERPL-CCNC: 81 U/L (ref 30–99)
ALT SERPL-CCNC: 11 U/L (ref 2–50)
ANION GAP SERPL CALC-SCNC: 9 MMOL/L (ref 7–16)
AST SERPL-CCNC: 14 U/L (ref 12–45)
BASOPHILS # BLD AUTO: 0.7 % (ref 0–1.8)
BASOPHILS # BLD: 0.07 K/UL (ref 0–0.12)
BILIRUB SERPL-MCNC: 0.3 MG/DL (ref 0.1–1.5)
BUN SERPL-MCNC: 26 MG/DL (ref 8–22)
CALCIUM SERPL-MCNC: 8.5 MG/DL (ref 8.5–10.5)
CHLORIDE SERPL-SCNC: 108 MMOL/L (ref 96–112)
CO2 SERPL-SCNC: 24 MMOL/L (ref 20–33)
CREAT SERPL-MCNC: 1.73 MG/DL (ref 0.5–1.4)
EOSINOPHIL # BLD AUTO: 0.49 K/UL (ref 0–0.51)
EOSINOPHIL NFR BLD: 4.8 % (ref 0–6.9)
ERYTHROCYTE [DISTWIDTH] IN BLOOD BY AUTOMATED COUNT: 51.5 FL (ref 35.9–50)
GLOBULIN SER CALC-MCNC: 3.4 G/DL (ref 1.9–3.5)
GLUCOSE SERPL-MCNC: 90 MG/DL (ref 65–99)
HCT VFR BLD AUTO: 33.8 % (ref 42–52)
HGB BLD-MCNC: 10.6 G/DL (ref 14–18)
IMM GRANULOCYTES # BLD AUTO: 0.04 K/UL (ref 0–0.11)
IMM GRANULOCYTES NFR BLD AUTO: 0.4 % (ref 0–0.9)
LYMPHOCYTES # BLD AUTO: 2.81 K/UL (ref 1–4.8)
LYMPHOCYTES NFR BLD: 27.8 % (ref 22–41)
MAGNESIUM SERPL-MCNC: 1.5 MG/DL (ref 1.5–2.5)
MCH RBC QN AUTO: 28.3 PG (ref 27–33)
MCHC RBC AUTO-ENTMCNC: 31.4 G/DL (ref 33.7–35.3)
MCV RBC AUTO: 90.1 FL (ref 81.4–97.8)
MONOCYTES # BLD AUTO: 0.74 K/UL (ref 0–0.85)
MONOCYTES NFR BLD AUTO: 7.3 % (ref 0–13.4)
NEUTROPHILS # BLD AUTO: 5.96 K/UL (ref 1.82–7.42)
NEUTROPHILS NFR BLD: 59 % (ref 44–72)
NRBC # BLD AUTO: 0 K/UL
NRBC BLD-RTO: 0 /100 WBC
PHOSPHATE SERPL-MCNC: 4 MG/DL (ref 2.5–4.5)
PLATELET # BLD AUTO: 256 K/UL (ref 164–446)
PMV BLD AUTO: 11.1 FL (ref 9–12.9)
POTASSIUM SERPL-SCNC: 3.4 MMOL/L (ref 3.6–5.5)
PROT SERPL-MCNC: 5.7 G/DL (ref 6–8.2)
RBC # BLD AUTO: 3.75 M/UL (ref 4.7–6.1)
SODIUM SERPL-SCNC: 141 MMOL/L (ref 135–145)
WBC # BLD AUTO: 10.1 K/UL (ref 4.8–10.8)

## 2021-06-21 PROCEDURE — 700102 HCHG RX REV CODE 250 W/ 637 OVERRIDE(OP): Performed by: INTERNAL MEDICINE

## 2021-06-21 PROCEDURE — 700111 HCHG RX REV CODE 636 W/ 250 OVERRIDE (IP): Performed by: INTERNAL MEDICINE

## 2021-06-21 PROCEDURE — A9270 NON-COVERED ITEM OR SERVICE: HCPCS | Performed by: INTERNAL MEDICINE

## 2021-06-21 PROCEDURE — 97116 GAIT TRAINING THERAPY: CPT

## 2021-06-21 PROCEDURE — 97530 THERAPEUTIC ACTIVITIES: CPT

## 2021-06-21 PROCEDURE — 84100 ASSAY OF PHOSPHORUS: CPT

## 2021-06-21 PROCEDURE — 99231 SBSQ HOSP IP/OBS SF/LOW 25: CPT | Performed by: INTERNAL MEDICINE

## 2021-06-21 PROCEDURE — 97535 SELF CARE MNGMENT TRAINING: CPT

## 2021-06-21 PROCEDURE — 80053 COMPREHEN METABOLIC PANEL: CPT

## 2021-06-21 PROCEDURE — 85025 COMPLETE CBC W/AUTO DIFF WBC: CPT

## 2021-06-21 PROCEDURE — 302098 PASTE RING (FLAT): Performed by: INTERNAL MEDICINE

## 2021-06-21 PROCEDURE — 770001 HCHG ROOM/CARE - MED/SURG/GYN PRIV*

## 2021-06-21 PROCEDURE — 83735 ASSAY OF MAGNESIUM: CPT

## 2021-06-21 RX ORDER — POTASSIUM CHLORIDE 20 MEQ/1
40 TABLET, EXTENDED RELEASE ORAL DAILY
Status: DISCONTINUED | OUTPATIENT
Start: 2021-06-21 | End: 2021-06-21

## 2021-06-21 RX ORDER — POTASSIUM CHLORIDE 20 MEQ/1
20 TABLET, EXTENDED RELEASE ORAL DAILY
Status: DISCONTINUED | OUTPATIENT
Start: 2021-06-21 | End: 2021-06-23

## 2021-06-21 RX ORDER — MAGNESIUM SULFATE HEPTAHYDRATE 40 MG/ML
2 INJECTION, SOLUTION INTRAVENOUS ONCE
Status: COMPLETED | OUTPATIENT
Start: 2021-06-21 | End: 2021-06-21

## 2021-06-21 RX ADMIN — METOPROLOL TARTRATE 25 MG: 25 TABLET, FILM COATED ORAL at 18:09

## 2021-06-21 RX ADMIN — LEVOTHYROXINE SODIUM 125 MCG: 0.12 TABLET ORAL at 05:52

## 2021-06-21 RX ADMIN — ZIPRASIDONE HYDROCHLORIDE 60 MG: 60 CAPSULE ORAL at 21:50

## 2021-06-21 RX ADMIN — AMIODARONE HYDROCHLORIDE 200 MG: 200 TABLET ORAL at 05:52

## 2021-06-21 RX ADMIN — AMIODARONE HYDROCHLORIDE 200 MG: 200 TABLET ORAL at 18:09

## 2021-06-21 RX ADMIN — HYDROCORTISONE 5 MG: 5 TABLET ORAL at 05:52

## 2021-06-21 RX ADMIN — OXYCODONE 5 MG: 5 TABLET ORAL at 05:55

## 2021-06-21 RX ADMIN — HYDROCORTISONE 5 MG: 5 TABLET ORAL at 15:44

## 2021-06-21 RX ADMIN — OMEPRAZOLE 40 MG: 20 CAPSULE, DELAYED RELEASE ORAL at 18:09

## 2021-06-21 RX ADMIN — PAROXETINE HYDROCHLORIDE 20 MG: 20 TABLET, FILM COATED ORAL at 08:43

## 2021-06-21 RX ADMIN — TAMSULOSIN HYDROCHLORIDE 0.4 MG: 0.4 CAPSULE ORAL at 10:06

## 2021-06-21 RX ADMIN — ATORVASTATIN CALCIUM 40 MG: 40 TABLET, FILM COATED ORAL at 18:09

## 2021-06-21 RX ADMIN — APIXABAN 5 MG: 5 TABLET, FILM COATED ORAL at 05:52

## 2021-06-21 RX ADMIN — ZIPRASIDONE HYDROCHLORIDE 60 MG: 60 CAPSULE ORAL at 08:42

## 2021-06-21 RX ADMIN — HYDROMORPHONE HYDROCHLORIDE 0.5 MG: 1 INJECTION, SOLUTION INTRAMUSCULAR; INTRAVENOUS; SUBCUTANEOUS at 10:25

## 2021-06-21 RX ADMIN — METOPROLOL TARTRATE 25 MG: 25 TABLET, FILM COATED ORAL at 05:52

## 2021-06-21 RX ADMIN — CLONAZEPAM 0.5 MG: 0.5 TABLET ORAL at 08:43

## 2021-06-21 RX ADMIN — OMEPRAZOLE 40 MG: 20 CAPSULE, DELAYED RELEASE ORAL at 05:52

## 2021-06-21 RX ADMIN — Medication 1000 UNITS: at 05:52

## 2021-06-21 RX ADMIN — CLONAZEPAM 1 MG: 1 TABLET ORAL at 18:09

## 2021-06-21 RX ADMIN — POTASSIUM CHLORIDE 20 MEQ: 1500 TABLET, EXTENDED RELEASE ORAL at 08:43

## 2021-06-21 RX ADMIN — APIXABAN 5 MG: 5 TABLET, FILM COATED ORAL at 18:09

## 2021-06-21 RX ADMIN — MAGNESIUM SULFATE 2 G: 2 INJECTION INTRAVENOUS at 08:42

## 2021-06-21 ASSESSMENT — ENCOUNTER SYMPTOMS
WEAKNESS: 1
MYALGIAS: 0
NERVOUS/ANXIOUS: 0
CHILLS: 0
WHEEZING: 0
NAUSEA: 0
FOCAL WEAKNESS: 0
SORE THROAT: 0
SHORTNESS OF BREATH: 0
SENSORY CHANGE: 0
HALLUCINATIONS: 0
DEPRESSION: 0
DIZZINESS: 0
DIAPHORESIS: 0
DIARRHEA: 1
BLURRED VISION: 0
HEADACHES: 0
PALPITATIONS: 0
SPEECH CHANGE: 0
NECK PAIN: 0
HEARTBURN: 0
BACK PAIN: 0
FEVER: 0
VOMITING: 0
FLANK PAIN: 0
CONSTIPATION: 0
ABDOMINAL PAIN: 0
COUGH: 0

## 2021-06-21 ASSESSMENT — COGNITIVE AND FUNCTIONAL STATUS - GENERAL
SUGGESTED CMS G CODE MODIFIER DAILY ACTIVITY: CK
DRESSING REGULAR UPPER BODY CLOTHING: A LITTLE
PERSONAL GROOMING: A LITTLE
DAILY ACTIVITIY SCORE: 15
MOVING FROM LYING ON BACK TO SITTING ON SIDE OF FLAT BED: UNABLE
CLIMB 3 TO 5 STEPS WITH RAILING: TOTAL
WALKING IN HOSPITAL ROOM: TOTAL
HELP NEEDED FOR BATHING: A LOT
SUGGESTED CMS G CODE MODIFIER MOBILITY: CN
MOVING TO AND FROM BED TO CHAIR: UNABLE
MOBILITY SCORE: 6
DRESSING REGULAR LOWER BODY CLOTHING: A LOT
TOILETING: TOTAL
TURNING FROM BACK TO SIDE WHILE IN FLAT BAD: UNABLE
STANDING UP FROM CHAIR USING ARMS: TOTAL

## 2021-06-21 ASSESSMENT — GAIT ASSESSMENTS
GAIT LEVEL OF ASSIST: MINIMAL ASSIST
DEVIATION: BRADYKINETIC;SHUFFLED GAIT
DISTANCE (FEET): 4
ASSISTIVE DEVICE: 4 WHEEL WALKER

## 2021-06-21 ASSESSMENT — PAIN DESCRIPTION - PAIN TYPE: TYPE: SURGICAL PAIN

## 2021-06-21 ASSESSMENT — FIBROSIS 4 INDEX: FIB4 SCORE: 0.96

## 2021-06-21 NOTE — THERAPY
Physical Therapy   Daily Treatment     Patient Name: Sebastián Castro  Age:  56 y.o., Sex:  male  Medical Record #: [id number]  Today's Date: 6/21/2021     Precautions: Fall Risk    Assessment  Pt with slow progress towards therapy goals with improvements in activity tolerance and mobility. Continues to require MOD/MAX assist for bed mobility and MAX A for STS. Pt with bowel incontinence once EOB and requires significant assist with cleanup. Bedding changed and pt took steps EOB with 4ww and JUDY with second incident of incontinence requiring more cleaning. Pt returned to bed supine with assist. In total 4 STS EOB and 4 feet side stepped with 4ww. Pt with slow progress but limited by incontinence and weakness/fatigue. Would continue to benefit form skilled PT to progress. Will progress POC as tolerated.     Plan    Continue current treatment plan.    DC Equipment Recommendations:  Unable to determine at this time  Discharge Recommendations:  Recommend post-acute placement for additional physical therapy services prior to discharge home      Subjective  Pt asleep upon arrival. Agreeable once awake. Reports no pain.      Objective  MOD/MAX bed mobility, MAX A for STS with 4 trials total EOB, 4ww used for side steps EOB with JUDY for walker management. Fatigues quickly.      06/21/21 1220   Total Time Spent   Total Time Spent (Mins) 30   Charge Group   Charges  Yes   PT Gait Training 1   PT Therapeutic Activities 1   Precautions   Precautions Fall Risk   Comments abd wound vac   Pain 0 - 10 Group   Therapist Pain Assessment Post Activity Pain Same as Prior to Activity;0   Non Verbal Descriptors   Non Verbal Scale  Calm   Cognition    Level of Consciousness Alert   Comments Pleasant   Balance   Sitting Balance (Static) Fair   Sitting Balance (Dynamic) Fair -   Standing Balance (Static) Fair -   Standing Balance (Dynamic) Poor +   Weight Shift Sitting Fair   Weight Shift Standing Poor   Skilled Intervention Verbal  Cuing;Tactile Cuing;Postural Facilitation   Comments 4ww   Gait Analysis   Gait Level Of Assist Minimal Assist   Assistive Device 4 Wheel Walker   Distance (Feet) 4   # of Times Distance was Traveled 1   Deviation Bradykinetic;Shuffled Gait   # of Stairs Climbed 0   Skilled Intervention Verbal Cuing;Tactile Cuing;Sequencing   Comments 4ww, steps EOB   Bed Mobility    Supine to Sit Moderate Assist   Sit to Supine Moderate Assist   Scooting Moderate Assist   Rolling Moderate Assist to Lt.   Skilled Intervention Verbal Cuing;Tactile Cuing;Sequencing;Postural Facilitation   Comments Draw sheet used   Functional Mobility   Sit to Stand Maximal Assist   Transfer Method Stand Step   Mobility STS x 4 EOB, steps EOB   Skilled Intervention Verbal Cuing;Tactile Cuing;Sequencing;Postural Facilitation   How much difficulty does the patient currently have...   Turning over in bed (including adjusting bedclothes, sheets and blankets)? 1   Sitting down on and standing up from a chair with arms (e.g., wheelchair, bedside commode, etc.) 1   Moving from lying on back to sitting on the side of the bed? 1   How much help from another person does the patient currently need...   Moving to and from a bed to a chair (including a wheelchair)? 1   Need to walk in a hospital room? 1   Climbing 3-5 steps with a railing? 1   6 clicks Mobility Score 6   Activity Tolerance   Sitting in Chair NT   Sitting Edge of Bed 20   Standing 5   Comments Fatigues quickly   Patient / Family Goals    Patient / Family Goal #1 walk again   Goal #1 Outcome Goal not met   Short Term Goals    Short Term Goal # 1 Pt will perform supine<->sit with supv within 6 visits in order to return home   Goal Outcome # 1 goal not met   Short Term Goal # 2 Pt will transfer bed<->chair with 4WW with supv within 6 visits in order to return home   Goal Outcome # 2 Goal not met   Short Term Goal # 3 Pt will ambulate 100' with supv within 6 visits in order to return home   Goal  Outcome # 3 Goal not met   Short Term Goal # 4 Pt will ascend/descend 5 steps with supv within 6 visits in order to enter/exit home   Goal Outcome # 4 Goal not met   Education Group   Education Provided Role of Physical Therapist   Role of Physical Therapist Patient Response Patient;Acceptance;Explanation;Verbal Demonstration   Anticipated Discharge Equipment and Recommendations   DC Equipment Recommendations Unable to determine at this time   Discharge Recommendations Recommend post-acute placement for additional physical therapy services prior to discharge home   Interdisciplinary Plan of Care Collaboration   IDT Collaboration with  Nursing   Patient Position at End of Therapy In Bed;Call Light within Reach;Tray Table within Reach;Phone within Reach   Collaboration Comments RN Updated   Session Information   Date / Session Number  6/21-5(1/3, 6/27)

## 2021-06-21 NOTE — WOUND TEAM
[facility]  Inpatient Services  Wound and Skin Care Progress Note    Admission Date: 2/9/2021     Last order of IP CONSULT TO WOUND CARE was found on 3/18/2021 from Hospital Encounter on 2/9/2021     HPI, PMH, SH: Reviewed    Past Surgical History:   Procedure Laterality Date   • PB EXPLORATORY OF ABDOMEN  5/27/2021    Procedure: LAPAROTOMY, EXPLORATORY;  Surgeon: Kin Desouza D.O.;  Location: [facility];  Service: General   • COLOSTOMY TAKEDOWN  5/27/2021    Procedure: COLOSTOMY TAKEDOWN AND CLOSURE;  Surgeon: Kin Desouza D.O.;  Location: SURGERY [facility];  Service: General   • COLECTOMY N/A 11/10/2020    Procedure: COLECTOMY-SEGMENTAL, COLOSTOMY, MOBILIZATION OF SPLENIC FLEXURE, WOUND VAC PLACEMENT, IRRIGATION AND DEBRIDMENT FLANK WOUND;  Surgeon: Kin Desouza D.O.;  Location: [facility];  Service: General   • ZZZ CARDIAC CATH  07/21/2018    EF 45%, normal coronaries.   • IRRIGATION & DEBRIDEMENT ORTHO Right 2/19/2018    Procedure: IRRIGATION & DEBRIDEMENT ORTHO-FOOT;  Surgeon: Kirby Lopez M.D.;  Location: [facility];  Service: Orthopedics   • TOE AMPUTATION Right 1/17/2018    Procedure: TOE AMPUTATION-1ST RAY;  Surgeon: Doug Delong M.D.;  Location: [facility];  Service: Orthopedics   • TOE AMPUTATION Right 11/10/2017    Procedure: TOE AMPUTATION;  Surgeon: Osorio Leo M.D.;  Location: [facility];  Service: Orthopedics     Social History     Tobacco Use   • Smoking status: Never Smoker   • Smokeless tobacco: Never Used   Substance Use Topics   • Alcohol use: No     Chief Complaint   Patient presents with   • Syncope     X1 this morning when standing up to go to bathroom     Diagnosis: Syncope and collapse [R55]  Suicidal ideation [R45.851]  Sepsis due to Klebsiella pneumoniae (HCC) [A41.4]    Unit where seen by Wound Team: T418/00     WOUND CONSULT/FOLLOW UP RELATED TO: abdominal wound    WOUND HISTORY:  Pt is an older  gentleman well known to [facility] wound team 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 Pt currently admitted since 2/9/21 related to SI concerns due to body image issues related to colostomy.      *Pt underwent surgical reversal of colostomy with Dr. Desouza on 5/20/21. MLI was re-opened at that time and was then closed with interrupted staples and packing. Unfortunately MLI continued to Dehisce and therefore wound team was consulted to further manage MLI with dakins packing.     6/3 Wound vac placed    WOUND ASSESSMENT/LDA     Negative Pressure Wound Therapy 06/03/21 Abdomen (Active)   Vacuum Serial Number [id number] 06/14/21 0900   NPWT Pump Mode / Pressure Setting Ulta    Dressing Type Small;Black Foam (Veraflo);Gray Foam (Cleanse Choice)    Number of Foam Pieces Used 3    Canister Changed No    Output (mL) 300 mL    NEXT Dressing Change/Treatment Date 06/23/21    VAC VeraFlo Irrigant Normal Saline    VAC VeraFlo Soak Time (mins) 10    VAC VeraFlo Instill Volume (ml) 24    VAC VeraFlo - Therapy Time (hrs) 2.5    VAC VeraFlo Pressure (mm/Hg) Intermittent;125 mmHg    WOUND NURSE ONLY - Time Spent with Patient (mins) 60      Wound 05/27/21 Full Thickness Wound Abdomen (Active)   Wound Image     06/16/21 1200   Site Assessment Red;Early/partial granulation    Periwound Assessment Clean;Dry;Intact    Margins Attached edges;Defined edges    Closure Secondary intention;Adhesive bandage    Drainage Amount Scant    Drainage Description Serosanguineous    Treatments Cleansed;Site care;Offloading    Wound Cleansing Approved Wound Cleanser    Periwound Protectant Skin Protectant Wipes to Periwound;Paste Ring;Drape    Dressing Cleansing/Solutions Normal Saline    Dressing Options Wound Vac    Dressing Changed Observed    Dressing Status Clean;Dry;Intact    Dressing Change/Treatment Frequency Monday, Wednesday, Friday, and As Needed    NEXT Dressing Change/Treatment Date 06/23/21    NEXT Weekly Photo (Inpatient  Only) 06/23/21    Non-staged Wound Description Full thickness    Wound Length (cm) 17 cm    Wound Width (cm) 9.4 cm    Wound Depth (cm) 2.5 cm    Wound Surface Area (cm^2) 159.8 cm^2    Wound Volume (cm^3) 399.5 cm^3    Wound Healing % -388    Tunneling (cm) 1 cm    Tunneling Clock Position of Wound 12    Tunneling - 2nd Location (cm) 0.7 cm    Tunneling Clock Position of Wound - 2nd Location 6    Shape Linear    Wound Odor None    Pulses N/A    Exposed Structures Sutures    WOUND NURSE ONLY - Time Spent with Patient (mins) 60      Vascular:    SHAYAN:   No results found.    Lab Values:    Lab Results   Component Value Date/Time    WBC 10.1 06/21/2021 04:25 AM    RBC 3.75 (L) 06/21/2021 04:25 AM    HEMOGLOBIN 10.6 (L) 06/21/2021 04:25 AM    HEMATOCRIT 33.8 (L) 06/21/2021 04:25 AM    CREACTPROT 0.48 06/10/2021 04:05 AM    SEDRATEWES 13 10/23/2018 08:02 AM    HBA1C 5.2 02/14/2021 01:30 AM      Culture Results show:  No results found for this or any previous visit (from the past 720 hour(s)).    Pain Level/Medicated:  IV Morphine immediately prior to dressing change      INTERVENTIONS BY WOUND TEAM:  Chart and images reviewed. Discussed with bedside RN. All areas of concern (based on picture review, LDA review and discussion with bedside RN) have been thoroughly assessed. Documentation of areas based on significant findings. This RN in to assess patient. Performed standard wound care which includes appropriate positioning, dressing removal and non-selective debridement. Pictures and measurements obtained weekly if/when required.              ABD:  Preparation for Dressing removal:  Soaked with NS from NPWT machine  Cleansed with: Wound cleanser and gauze   Sharp debridement: One staple fell out from middle of distal end. Curette used to removed small amount of yellow slough from distal end of wound. Scant bleeding controlled with manual pressure.  Joya wound: Cleansed with Wound cleanser and gauze, No sting skin and paste  "ring x 2, mepitel one to distal and proximal staples. Hydrocolloid thin to deroofed bullaes  Primary Dressin piece of grey waffle layer to fascial line sutures and base of wound bed, remaining wound bed filled with 1/2 medium VF black foam, edges pulled together and sealed with drape.   Secondary (Outer) Dressing:  Sealed with drape, TRAC pad applied.     Interdisciplinary consultation: Patient, Bedside RN, General Surgery (Dr. Rooney) - via TC    EVALUATION / RATIONALE FOR TREATMENT:  Most Recent Date:  21: Edges pulled together with jones by Dr. Rooney during change on Friday. Discussed with Lyly today. MD unable to be bedside today. Will continue with veraflo hopefully will be able to transition to regular vac at next visit to further assist with edges being pulled together. Meet with Dr. Rooney on Wednesday at 8am to assess wound.    21: continuing to decrease in slough in wound bed. One suture towards proximal end visible, per MD rooney, leave in place but monitor. Staples added to superior and inferior ends to assist with closure by MD. Continuing to pull edges together, MD will reassess on Monday with wound team to monitor for improvement and decide if surgical I&D is needed or if appropriate to switch to regular NPWT dressing instead of VF.   21: Wound with decreased slough. Wound appears to be \"flattening out.\" Veraflo is assisting with cleansing wound but is making pulling edges together difficult. Discussed with Dr. Rooney. Will plan to Meet with Dr. Rooney on Friday at 8am to assess wound, Dr. Rooney to pull superior and inferior borders together and staple to assist with closure.  21: Wound continues to granulate. Small amount of slough over fascial line. Discussed with Dr. Rooney. Plan to continue with veraflo, settings decreased. Will evaluate the wound with Dr. Rooney on Wednesday at 9am. Per MD may consider OR washout with staples to distal proximal aspect to " assist in wound closure vs. Adding staples at bedside.   06/11/21:  Wound looks good, minimal slough mainly to fascial line sutures.  VF grey foam layer to assist in debridement of slough.  Dr. Desouza to re-assess Monday and do surgical debridement if needed next week.   06/09/21:  Wound continues to progress, pull edges together to lessen width of wound. Dr. Desouza would like to see wound on Friday 6/7: Pt's wound appears to be improving per measurements and pictures. There is granulation tissue present.   06/5/21: Wound bed improving with increased granulation tissue.  Wound edges approximated prior to drape application.  NPWT continued.    06/03/21:  Fascial sutures intact, wound VAC placed with DR. Desouza.    6/1/21: Wound appears  than yesterdays pictures. Discussed with Dr. Desouza, plan to continue dakins TID for 48hrs, CT scan ordered today. Will re-evaluate on Thursday for potential vac placement.   5/31/21: Wound initially with interrupted staples and packing now with dehiscence. Per Dr. Baeza request wound team initiated dakins packing. Pt may benefit from NPWT to assist with closure.   5/8/21: wound nearly healed. Does not require advanced wound care. Wound team signing off and nursing to re consult if site worsens. Continue with dressing care per order.  4/21/21: abd wound improving. Nursing to continue dsg changes per order.  4/15: Right posterior thigh DTI now resolved. Pink and blanching. Abdominal wound to surface level continuing with dressing POC.  4/7/21: Thigh wound nearly healed and will likely be resolved at next assessment. Abdomen wound appears larger. Continued with Arti to move through inflammatory phase. Hydrofera blue for its bacteriostatic properties and to assist in drainage management.   03/31/21: thigh wound healing well now to a stage 2.  Continue current plan.  All interventions in place.   ABD more denuded and bloody.  Arti to assist in moving wound through  inflammatory phase.   3/23/21: Right posterior thigh wound remains a DTI but appears to be evolving into a stage 2. Wound team was previously consulted regarding sacral wound which appeared to be a skin tear however skin now appears to be intact however discolored. Does not appear to be pressure related as pt is on a waffle, self mobile and sacral mepilex is in use. Area is also not a normal pressure injury shape. Wound team will continue to follow.   3/18/21: Pt with linear DTIs to R posterior thigh from lying on top of frye cath tubing. Offloading precautions ordered. Wound team to continue following.     Goals: Steady decrease in wound area and depth weekly.    WOUND TEAM PLAN OF CARE ([X] for frequency of wound follow up,):   Nursing to follow orders written for wound care. Contact wound team if area fails to progress, deteriorates or with any questions/concerns  Dressing changes by wound team:                   Follow up 3 times weekly:                NPWT change 3 times weekly:   X  Follow up 1-2 times weekly:     Follow up Bi-Monthly:                   Follow up as needed:   Other (explain):     NURSING PLAN OF CARE ORDERS (X):  Dressing changes: See Dressing Care orders: X  Skin care: See Skin Care orders: x  RN Prevention Protocol: x  Rectal tube care: See Rectal Tube Care orders:   Other orders:        Anticipated discharge plans:   LTACH:        SNF/Rehab:                  Home Health Care:           Outpatient Wound Center:            Self/Family Care:        Other:   Group Home

## 2021-06-21 NOTE — THERAPY
Occupational Therapy  Daily Treatment     Patient Name: Sebastián Castro  Age:  56 y.o., Sex:  male  Medical Record #: [id number]  Today's Date: 6/21/2021     Precautions: (P) Fall Risk  Comments: (P) abdominal wound vac    Assessment    Pt agreeable to therapy today. Pt making slow progress with therapy at this time. Pt continues to require high level of assist for self cares including max A for toilet management, max A for LB dressing, and max A for bed mob. Due to therapeutic plateau pt frequency was reduced (may be improved with functional gains). Will continue to follow for OT while in house.     Plan    Treatment plan modified to 2 times per week until therapy goals are met for the following treatments:  Adaptive Equipment, Self Care/Activities of Daily Living, Therapeutic Activities and Therapeutic Exercises.    DC Equipment Recommendations: (P) Unable to determine at this time  Discharge Recommendations: (P) Recommend post-acute placement for additional occupational therapy services prior to discharge home       06/21/21 1238   Precautions   Precautions Fall Risk   Comments abdominal wound vac   Pain   Pain Scales 0 to 10 Scale    Intervention Declines   Cognition    Comments pleasant and cooperative and willing to work with therapy today   Other Treatments   Other Treatments Provided addressed G/H sitting EOB, and bathing groin area while supine   Balance   Sitting Balance (Static) Fair   Sitting Balance (Dynamic) Fair -   Standing Balance (Static) Fair -   Standing Balance (Dynamic) Poor +   Weight Shift Sitting Fair   Weight Shift Standing Poor   Skilled Intervention Verbal Cuing;Tactile Cuing;Sequencing;Compensatory Strategies   Comments with FWW   Bed Mobility    Supine to Sit Moderate Assist   Sit to Supine Moderate Assist   Scooting Moderate Assist   Skilled Intervention Verbal Cuing;Tactile Cuing;Sequencing   Comments Good initation of bed mobility   Activities of Daily Living   Grooming Minimal  Assist;Seated   Lower Body Dressing Maximal Assist   Toileting Maximal Assist  (incontinent BM)   Skilled Intervention Verbal Cuing;Tactile Cuing;Sequencing;Compensatory Strategies   Comments Able to initate bathing groin in supine; cues for G/H EOB (good seated balance during task) for combing hair and washing face); required max A in standing for completing wiping from incontinent BM   How much help from another person does the patient currently need...   Putting on and taking off regular lower body clothing? 2   Bathing (including washing, rinsing, and drying)? 2   Toileting, which includes using a toilet, bedpan, or urinal? 1   Putting on and taking off regular upper body clothing? 3   Taking care of personal grooming such as brushing teeth? 3   Eating meals? 4   6 Clicks Daily Activity Score 15   Functional Mobility   Sit to Stand Maximal Assist   Transfer Method Stand Step   Mobility Bed mob, EOB sitting, STS x4, side step   Skilled Intervention Verbal Cuing;Tactile Cuing;Sequencing;Compensatory Strategies   Comments from low bed height   Activity Tolerance   Sitting Edge of Bed 22 min   Standing 1 min x 4   Comments quick to fatigue requiring sit break   Patient / Family Goals   Patient / Family Goal #1 To feel better   Goal #1 Outcome Goal not met   Short Term Goals   Short Term Goal # 1 Pt will perform LB dressing w/ min A   Goal Outcome # 1 Progressing slower than expected   Short Term Goal # 2 Pt will perform functional t/f's with supervision   Goal Outcome # 2 Progressing slower than expected   Short Term Goal # 3 Pt will perform h/g standing sink side with supervision   Goal Outcome # 3 Progressing slower than expected   Education Group   Role of Occupational Therapist Patient Response Patient;Acceptance;Explanation;Demonstration;Verbal Demonstration;Reinforcement Needed   Transfers Patient Response Patient;Acceptance;Explanation;Demonstration;Verbal Demonstration;Reinforcement Needed   ADL Patient  Response Patient;Acceptance;Explanation;Demonstration;Verbal Demonstration;Reinforcement Needed   Anticipated Discharge Equipment and Recommendations   DC Equipment Recommendations Unable to determine at this time   Discharge Recommendations Recommend post-acute placement for additional occupational therapy services prior to discharge home   Interdisciplinary Plan of Care Collaboration   IDT Collaboration with  Nursing   Patient Position at End of Therapy In Bed;Phone within Reach;Tray Table within Reach;Call Light within Reach   Collaboration Comments OT tx and recs   Session Information   Date / Session Number  6/21 6 (1/2, 6/27)  (decreased frequency d/t plateau with therapy)   Priority 1

## 2021-06-21 NOTE — PROGRESS NOTES
[facility] Medicine Daily Progress Note    Date of Service  6/21/2021    Chief Complaint  56 y.o. male admitted 2/9/2021 with Syncope.    Hospital Course  56-year-old male with history of atrial fibrillation, diabetes, adrenal insufficiency, depression and obesity presented 2/9 with syncope and bradycardia with hypotension. Patient was on diltiazem, digoxin and metoprolol which all were held; once, bradycardia had improved and resumed metoprolol and digoxin with later switched to amiodarone by cardiologist, Eliquis was started for anticoagulation.    He also had a possible history of adrenal insufficiency, he was already on Florinef and midodrine.  Midodrine was discontinued and he was started on cortisol. Also florinef was discontinued since volume overload.     Further work-up showed that he had gram-negative bacteremia as well as urinary tract infection and acute kidney injury.  He was initially placed on IV Zosyn, later changed to oral Bactrim to finish the course.      On admission, he also expressed suicidal ideation.  Psychiatry was consulted the case, he was placed on a legal hold.  He was noted to have severe depression.  He has been placed on medication the form of Geodon, Paxil, Klonopin.  He has a colostomy in place, which the patient believes contributes to his severe depression because of ostomy care. Legal hold though discontinued because pt did improve significantly once ostomy was reversed.     He had history of bowel perforation and splenic fluid collection requiring Segmental colectomy, colostomy creation, mobilization of splenic flexure, wound VAC placement, irrigation and debridement of flank wound on 11/10/2020.  Surgery was consulted on the case, and he underwent exploratory laparotomy and colostomy takedown and closure on 5/27/2021. Pt was followed by wound care post op with wound vac interval changes.    Patient lives by himself and is not able to take care of himself at this time, PT and OT  evaluated the patient and recommended SNF    Interval Problem Update  6/15- he is feeling well overall. He had two bowel movement. No diarrhea. He is volume overload. Stop IVF. Continue to monitor. Consider start dose of lasix in the future. Still remain tachycardic. To consider to increase amiodarone ?? Or add metoprolol. Pt very sensitive to BP meds    6/16- pt doing well. No more diarrhea from yesterday. Hold on stool softer. BP improving. Start low dose lasix. Continue to monitor. More staples this week.     6/17- doing very well. No more diarrhea. Electrolytes replaced. Continue to monitor     6/18- doing well. Diarrhea subsided. Some more staples on his wound today. Hgb is dropping some though.  Continue to monitor    6/19- no event. Some loose stool today. Abdominal pain durable. BP stable.     6/20- he did have two loose stool. Feeling well overall. No other event. Continue same treatment, HR better controled.     6/21- no event overnight,. Still some loose stool yesterday. Vitals stable. Pictures from wound team reviewed. Continue wound care and monitoring     Consultants/Specialty  Cardiology  Psychiatry  Surgery    Code Status  Full Code    Disposition  SNF placement pending   Patient needs help for daily activities  Patient is medically cleared      Review of Systems  Review of Systems   Constitutional: Positive for malaise/fatigue. Negative for chills, diaphoresis and fever.   HENT: Negative for congestion, hearing loss and sore throat.    Eyes: Negative for blurred vision.   Respiratory: Negative for cough, shortness of breath and wheezing.    Cardiovascular: Negative for chest pain, palpitations and leg swelling.   Gastrointestinal: Positive for diarrhea. Negative for abdominal pain, constipation, heartburn, nausea and vomiting.   Genitourinary: Negative for dysuria, flank pain and hematuria.   Musculoskeletal: Negative for back pain, joint pain, myalgias and neck pain.   Skin: Negative for rash.    Neurological: Positive for weakness. Negative for dizziness, sensory change, speech change, focal weakness and headaches.   Psychiatric/Behavioral: Negative for depression, hallucinations and suicidal ideas. The patient is not nervous/anxious.       Physical Exam  Temp:  [36.1 °C (96.9 °F)-36.5 °C (97.7 °F)] 36.1 °C (96.9 °F)  Pulse:  [62-72] 63  Resp:  [16-18] 18  BP: ()/(53-72) 107/66  SpO2:  [91 %-96 %] 95 %    Physical Exam  Vitals and nursing note reviewed.   Constitutional:       Appearance: He is obese.   HENT:      Head: Normocephalic and atraumatic.      Nose: No congestion.      Mouth/Throat:      Mouth: Mucous membranes are moist.   Eyes:      Extraocular Movements: Extraocular movements intact.      Conjunctiva/sclera: Conjunctivae normal.      Pupils: Pupils are equal, round, and reactive to light.   Cardiovascular:      Rate and Rhythm: Normal rate. Rhythm irregular.   Pulmonary:      Effort: Pulmonary effort is normal. No respiratory distress.      Breath sounds: No wheezing or rales.   Abdominal:      General: There is no distension.      Tenderness: There is no abdominal tenderness. There is no guarding or rebound.      Comments: Wound vac in place    Musculoskeletal:      Cervical back: No muscular tenderness.      Right lower leg: No edema.      Left lower leg: No edema.   Skin:     General: Skin is warm and dry.      Findings: No bruising.   Neurological:      General: No focal deficit present.      Mental Status: He is alert and oriented to person, place, and time.      Cranial Nerves: No cranial nerve deficit.      Motor: No weakness.   Psychiatric:         Mood and Affect: Mood is not anxious.         Speech: Speech normal.         Thought Content: Thought content is not paranoid or delusional. Thought content does not include homicidal or suicidal ideation. Thought content does not include homicidal or suicidal plan.         Judgment: Judgment is not impulsive.        Fluids    Intake/Output Summary (Last 24 hours) at 6/21/2021 1312  Last data filed at 6/21/2021 0835  Gross per 24 hour   Intake --   Output 425 ml   Net -425 ml       Laboratory  Recent Labs     06/19/21  0240 06/21/21  0425   WBC 8.6 10.1   RBC 3.47* 3.75*   HEMOGLOBIN 9.8* 10.6*   HEMATOCRIT 30.8* 33.8*   MCV 88.8 90.1   MCH 28.2 28.3   MCHC 31.8* 31.4*   RDW 51.8* 51.5*   PLATELETCT 274 256   MPV 11.1 11.1     Recent Labs     06/19/21  0240 06/21/21  0425   SODIUM 137 141   POTASSIUM 3.7 3.4*   CHLORIDE 106 108   CO2 23 24   GLUCOSE 99 90   BUN 22 26*   CREATININE 1.79* 1.73*   CALCIUM 8.0* 8.5                   Imaging  US-RENAL   Final Result      1.  Low-level echogenic debris in the urinary bladder is noted which could indicate UTI or blood within the bladder.      2.  No hydronephrosis.      IR-MIDLINE CATHETER INSERTION WO GUIDANCE > AGE 3   Final Result                  Ultrasound-guided midline placement performed by qualified nursing staff    as above.          EC-ECHOCARDIOGRAM COMPLETE W/ CONT   Final Result      DX-CHEST-PORTABLE (1 VIEW)   Final Result      1.  Minor asymmetric interstitial opacity in the right lower lobe which could represent minor interstitial edema, pneumonia, or fibrotic change.      2.  No left lung consolidation.      3.  No evidence of congestive failure.      RN-APZSZOC-1 VIEW   Final Result         No specific finding to suggest small bowel obstruction.      AW-GOGHYJB-5 VIEW   Final Result         No significant interval change.      DX-CHEST-LIMITED (1 VIEW)   Final Result         Diffuse interstitial prominence could relate to mild pulmonary edema or atypical infection      [id number] VIEW   Final Result      Increased colonic gas without definite bowel obstruction.      US-RENAL   Final Result      No evidence of hydronephrosis.      Lobulated kidneys bilaterally.      CT-ABDOMEN-PELVIS WITH   Final Result      1.  There is no evidence of small bowel  obstruction.   2.  There is a left lower quadrant ostomy.   3.  There is fluid distention of the colon distal to the surgical material in the left mid descending colon extending all the way to the rectum. There is no pneumatosis or free air.   4.  There is cholelithiasis without biliary dilatation.   5.  There has been interval removal of the drainage catheter anterior to the spleen with minimal hypodense area in the anterior spleen again noted. There is no new fluid collection.      IR-US GUIDED PIV   Final Result    Ultrasound-guided PERIPHERAL IV INSERTION performed by    qualified nursing staff as above.      EC-ECHOCARDIOGRAM COMPLETE W/O CONT   Final Result      DX-LUMBAR SPINE-2 OR 3 VIEWS   Final Result      Moderate compression deformity of T11 is new compared to 2018.      Mild wedge deformity of T12 is unchanged.      Degenerative changes including facet arthropathy.      Mild retrolisthesis of L5 on S1 and L3 on L4.              Assessment/Plan  * Debility- (present on admission)  Assessment & Plan  PT and OT; SNF  vit d level low 5/29- start replacement   Patient has generalized weakness  Poor prognosis    Normocytic anemia- (present on admission)  Assessment & Plan  6/18- hgb continues slowly to drop  Pt on chronic steroid which increase risk for stress ulcers   Start omeprazole 40 mg BID   Iron studies, TSH/B/FOLAte, occult studies normal. Mix anemia of chronic inflammation and mild iron def combination     Leukocytosis- (present on admission)  Assessment & Plan  Around 12-13  No fever or chills  Continue his medications  Consider repeat CT scan for abdomen if needed  6/18- normal. Continue to monitor. Consider abdomen CT if signs of infection      Obstructive uropathy- (present on admission)  Assessment & Plan  Bladder scan and straight cath prn    Permanent atrial fibrillation (HCC)- (present on admission)  Assessment & Plan  6/7- a-fib persistent. Cardiology consulted again to start amiodarone    Stop digoxin high risk toxicity with SRUTHI   Continue eliquis   6/20- better. Metoprolol restarted 6/19. Continue to monitor     Chronic venous insufficiency of lower extremity- (present on admission)  Assessment & Plan  compression stockings.    Chronic heart failure with preserved ejection fraction (HCC)- (present on admission)  Assessment & Plan  Toprol was held due to low BP and orthostatic hypo  C/w Atorvastatin  Monitor fluid status and Cr  6/16- restarted lasix  6/19- restarted metoprolol  Continue to monitor I&O       Type 2 diabetes mellitus with hyperglycemia, with long-term current use of insulin (HCC)- (present on admission)  Assessment & Plan  Follow bmp  Currently not requiring Insulin    Major depressive disorder, recurrent, severe with psychotic features (HCC)- (present on admission)  Assessment & Plan  Geodon, Paxil, Klonopin  Psychiatry following     Colostomy present (HCC)- (present on admission)  Assessment & Plan  Exploratory laparotomy with colostomy takedown and closure 5/27/2021  Segmental colectomy, colostomy creation, mobilization of splenic flexure, wound VAC placement, irrigation and debridement of flank wound on 11/10/2020.  Improving on his leukocytosis  No fever or chills and wound VAC in place   6/15- off antibiotic   6/21- continue to monitor. Plan Dr. Desouza to see pt on 6/23 with wound team         Hypothyroidism- (present on admission)  Assessment & Plan  Increased Levothyroxine to 125 mcg  Check TSH on 6/29/2021    SRUTHI (acute kidney injury) (HCC)- (present on admission)  Assessment & Plan  6/1- resolved   FeNa showed prerenal  6/18- remain stable on lasix. Continue to monitor   6/19- stable    Adrenal insufficiency (HCC)- (present on admission)  Assessment & Plan  Likely  Redcued corticef 5 mg BID 6/13  And then corticef 5 mg daily on 7/4  He need endo evaluation as outpatient    Suicidal ideation- (present on admission)  Assessment & Plan  6/4- legal hold discontinued.    Possible SNF placement     Orthostatic hypotension- (present on admission)  Assessment & Plan  Stop florinef.  Florinef increase risk of water retention, poor wound healing and he is already on hydrocortisone   6/20-pt was started on lasix and metoprolol. He is tolerating them well. BP stable off florinef . No need for midodrine     Mixed hyperlipidemia- (present on admission)  Assessment & Plan  Atorvastatin.     Lower limb amputation, great toe (HCC)- (present on admission)  Assessment & Plan  Monitor  No signs of infection     Hypomagnesemia- (present on admission)  Assessment & Plan  Monitor and replace as needed     Class 2 severe obesity with serious comorbidity and body mass index (BMI) of 35.0 to 35.9 in adult (HCC)- (present on admission)  Assessment & Plan  Body mass index is 35.19 kg/m².    Hypokalemia- (present on admission)  Assessment & Plan  Monitor and replete as needed       VTE prophylaxis: eliquis       Interventions to be considered in all patients in order to minimize the risk of delirium.   -do not disturb patient (vitals or lab draws) between the hours of 10 PM and 6 AM.  -ideally the patient should not sleep during the day and we should avoid day time naps.   -up in chair for meals  -ambulate at least three times daily, as able  -watch for constipation  -timed voiding - ask patient is she would like to go to the bathroom q 2-3 hours, except during the do not disturb hours.   -remove all necessary lines (central lines, peripheral IVs, feeding tubes, frye catheters)  -unless patient has shown harm to self or others I would recommend against use of restraints - either chemical or physical (antipsychotics)   -minimize polypharmacy, do not dose medication during sleep hours

## 2021-06-22 PROCEDURE — 700102 HCHG RX REV CODE 250 W/ 637 OVERRIDE(OP): Performed by: INTERNAL MEDICINE

## 2021-06-22 PROCEDURE — A9270 NON-COVERED ITEM OR SERVICE: HCPCS | Performed by: INTERNAL MEDICINE

## 2021-06-22 PROCEDURE — 302146: Performed by: STUDENT IN AN ORGANIZED HEALTH CARE EDUCATION/TRAINING PROGRAM

## 2021-06-22 PROCEDURE — 700111 HCHG RX REV CODE 636 W/ 250 OVERRIDE (IP): Performed by: INTERNAL MEDICINE

## 2021-06-22 PROCEDURE — 770001 HCHG ROOM/CARE - MED/SURG/GYN PRIV*

## 2021-06-22 PROCEDURE — 99232 SBSQ HOSP IP/OBS MODERATE 35: CPT | Performed by: STUDENT IN AN ORGANIZED HEALTH CARE EDUCATION/TRAINING PROGRAM

## 2021-06-22 PROCEDURE — A9270 NON-COVERED ITEM OR SERVICE: HCPCS | Performed by: STUDENT IN AN ORGANIZED HEALTH CARE EDUCATION/TRAINING PROGRAM

## 2021-06-22 PROCEDURE — 700102 HCHG RX REV CODE 250 W/ 637 OVERRIDE(OP): Performed by: STUDENT IN AN ORGANIZED HEALTH CARE EDUCATION/TRAINING PROGRAM

## 2021-06-22 RX ORDER — POTASSIUM CHLORIDE 20 MEQ/1
20 TABLET, EXTENDED RELEASE ORAL 2 TIMES DAILY
Status: COMPLETED | OUTPATIENT
Start: 2021-06-22 | End: 2021-06-22

## 2021-06-22 RX ADMIN — POTASSIUM CHLORIDE 20 MEQ: 1500 TABLET, EXTENDED RELEASE ORAL at 18:26

## 2021-06-22 RX ADMIN — PAROXETINE HYDROCHLORIDE 20 MG: 20 TABLET, FILM COATED ORAL at 09:17

## 2021-06-22 RX ADMIN — HYDROCORTISONE 5 MG: 5 TABLET ORAL at 05:34

## 2021-06-22 RX ADMIN — ZIPRASIDONE HYDROCHLORIDE 60 MG: 60 CAPSULE ORAL at 21:00

## 2021-06-22 RX ADMIN — HYDROCORTISONE 5 MG: 5 TABLET ORAL at 15:22

## 2021-06-22 RX ADMIN — APIXABAN 5 MG: 5 TABLET, FILM COATED ORAL at 05:33

## 2021-06-22 RX ADMIN — ATORVASTATIN CALCIUM 40 MG: 40 TABLET, FILM COATED ORAL at 18:24

## 2021-06-22 RX ADMIN — ONDANSETRON 4 MG: 4 TABLET, ORALLY DISINTEGRATING ORAL at 09:17

## 2021-06-22 RX ADMIN — CLONAZEPAM 1 MG: 1 TABLET ORAL at 18:25

## 2021-06-22 RX ADMIN — POTASSIUM CHLORIDE 20 MEQ: 1500 TABLET, EXTENDED RELEASE ORAL at 09:20

## 2021-06-22 RX ADMIN — Medication 1000 UNITS: at 05:33

## 2021-06-22 RX ADMIN — APIXABAN 5 MG: 5 TABLET, FILM COATED ORAL at 18:24

## 2021-06-22 RX ADMIN — CLONAZEPAM 0.5 MG: 0.5 TABLET ORAL at 09:17

## 2021-06-22 RX ADMIN — LEVOTHYROXINE SODIUM 125 MCG: 0.12 TABLET ORAL at 05:33

## 2021-06-22 RX ADMIN — AMIODARONE HYDROCHLORIDE 200 MG: 200 TABLET ORAL at 18:24

## 2021-06-22 RX ADMIN — OMEPRAZOLE 40 MG: 20 CAPSULE, DELAYED RELEASE ORAL at 05:33

## 2021-06-22 RX ADMIN — POTASSIUM CHLORIDE 20 MEQ: 1500 TABLET, EXTENDED RELEASE ORAL at 05:33

## 2021-06-22 RX ADMIN — OXYCODONE 5 MG: 5 TABLET ORAL at 16:09

## 2021-06-22 RX ADMIN — TAMSULOSIN HYDROCHLORIDE 0.4 MG: 0.4 CAPSULE ORAL at 09:16

## 2021-06-22 RX ADMIN — OMEPRAZOLE 40 MG: 20 CAPSULE, DELAYED RELEASE ORAL at 18:25

## 2021-06-22 RX ADMIN — OXYCODONE 5 MG: 5 TABLET ORAL at 09:23

## 2021-06-22 RX ADMIN — AMIODARONE HYDROCHLORIDE 200 MG: 200 TABLET ORAL at 05:33

## 2021-06-22 RX ADMIN — METOPROLOL TARTRATE 25 MG: 25 TABLET, FILM COATED ORAL at 05:33

## 2021-06-22 RX ADMIN — METOPROLOL TARTRATE 25 MG: 25 TABLET, FILM COATED ORAL at 18:25

## 2021-06-22 RX ADMIN — FUROSEMIDE 20 MG: 20 TABLET ORAL at 05:33

## 2021-06-22 RX ADMIN — ZIPRASIDONE HYDROCHLORIDE 60 MG: 60 CAPSULE ORAL at 09:15

## 2021-06-22 ASSESSMENT — ENCOUNTER SYMPTOMS
HEADACHES: 0
BACK PAIN: 0
NECK PAIN: 0
CONSTIPATION: 0
VOMITING: 0
MYALGIAS: 0
WHEEZING: 0
NAUSEA: 0
SORE THROAT: 0
SENSORY CHANGE: 0
DIAPHORESIS: 0
PALPITATIONS: 0
FOCAL WEAKNESS: 0
DIARRHEA: 1
NERVOUS/ANXIOUS: 0
SPEECH CHANGE: 0
BLURRED VISION: 0
SHORTNESS OF BREATH: 0
ABDOMINAL PAIN: 0
HEARTBURN: 0
DEPRESSION: 0
DIZZINESS: 0
HALLUCINATIONS: 0
WEAKNESS: 1
COUGH: 0
FLANK PAIN: 0
FEVER: 0
CHILLS: 0

## 2021-06-22 ASSESSMENT — PAIN DESCRIPTION - PAIN TYPE
TYPE: ACUTE PAIN
TYPE: SURGICAL PAIN
TYPE: SURGICAL PAIN

## 2021-06-22 ASSESSMENT — FIBROSIS 4 INDEX: FIB4 SCORE: 0.96

## 2021-06-22 NOTE — DISCHARGE PLANNING
Agency/Facility Name: [facility]  Outcome: DPA left a vmail. Awaiting call back.    -[phone]   Agency/Facility Name: [facility]  Spoke To: Shirlene  Outcome: Pt declined due to no medicaid beds.

## 2021-06-22 NOTE — PROGRESS NOTES
Assumed care of patient this shift. Patient is alert and oriented x4. Able to make his needs known. Denied complaints of pain this shift when asked. Complained of pain this shift, medicated with PRN pain medication prior to wound care; see MAR. Fall prevention tactics in place, bed locked and in lowest position and bed alarm on for safety. Plan of care discussed with patient and call light is within reach.

## 2021-06-22 NOTE — CARE PLAN
The patient is Stable - Low risk of patient condition declining or worsening    Shift Goals  Clinical Goals: Patient's pain will be controlled     Progress made toward(s) clinical / shift goals:   Patient complained of pain this shift. Medicated with PRN pain medication per MAR. Patient noted relief from pain and was able to rest with interventions.     Problem: Knowledge Deficit - Standard  Goal: Patient and family/care givers will demonstrate understanding of plan of care, disease process/condition, diagnostic tests and medications  Outcome: Progressing     Problem: Fall Risk  Goal: Patient will remain free from falls  Outcome: Progressing     Problem: Pain - Standard  Goal: Alleviation of pain or a reduction in pain to the patient’s comfort goal  Outcome: Progressing     Problem: Mobility  Goal: Patient's capacity to carry out activities will improve  Outcome: Progressing

## 2021-06-22 NOTE — PROGRESS NOTES
[facility] Medicine Daily Progress Note    Date of Service  6/22/2021    Chief Complaint  56 y.o. male admitted 2/9/2021 with Syncope.    Hospital Course  56-year-old male with history of atrial fibrillation, diabetes, adrenal insufficiency, depression and obesity presented 2/9 with syncope and bradycardia with hypotension. Patient was on diltiazem, digoxin and metoprolol which all were held; once, bradycardia had improved and resumed metoprolol and digoxin with later switched to amiodarone by cardiologist, Eliquis was started for anticoagulation.    He also had a possible history of adrenal insufficiency, he was already on Florinef and midodrine.  Midodrine was discontinued and he was started on cortisol. Also florinef was discontinued since volume overload.     Further work-up showed that he had gram-negative bacteremia as well as urinary tract infection and acute kidney injury.  He was initially placed on IV Zosyn, later changed to oral Bactrim to finish the course.      On admission, he also expressed suicidal ideation.  Psychiatry was consulted the case, he was placed on a legal hold.  He was noted to have severe depression.  He has been placed on medication the form of Geodon, Paxil, Klonopin.  He has a colostomy in place, which the patient believes contributes to his severe depression because of ostomy care. Legal hold though discontinued because pt did improve significantly once ostomy was reversed.     He had history of bowel perforation and splenic fluid collection requiring Segmental colectomy, colostomy creation, mobilization of splenic flexure, wound VAC placement, irrigation and debridement of flank wound on 11/10/2020.  Surgery was consulted on the case, and he underwent exploratory laparotomy and colostomy takedown and closure on 5/27/2021. Pt was followed by wound care post op with wound vac interval changes.    Patient lives by himself and is not able to take care of himself at this time, PT and OT  evaluated the patient and recommended SNF    Interval Problem Update  I evaluated and examined the patient at the bedside.  Labs and imaging were reviewed. Care plan was discussed with the patient and bedside nurse.  Denies suicidal ideation; off legal hold  Abdominal wound vacuum in place  Pending placement    Consultants/Specialty  Cardiology  Psychiatry  Surgery    Code Status  Full Code    Disposition  SNF placement pending   Patient needs help for daily activities  Patient is medically cleared      Review of Systems  Review of Systems   Constitutional: Positive for malaise/fatigue. Negative for chills, diaphoresis and fever.   HENT: Negative for congestion, hearing loss and sore throat.    Eyes: Negative for blurred vision.   Respiratory: Negative for cough, shortness of breath and wheezing.    Cardiovascular: Negative for chest pain, palpitations and leg swelling.   Gastrointestinal: Positive for diarrhea. Negative for abdominal pain, constipation, heartburn, nausea and vomiting.   Genitourinary: Negative for dysuria, flank pain and hematuria.   Musculoskeletal: Negative for back pain, joint pain, myalgias and neck pain.   Skin: Negative for rash.   Neurological: Positive for weakness. Negative for dizziness, sensory change, speech change, focal weakness and headaches.   Psychiatric/Behavioral: Negative for depression, hallucinations and suicidal ideas. The patient is not nervous/anxious.       Physical Exam  Temp:  [36.1 °C (96.9 °F)-36.5 °C (97.7 °F)] 36.5 °C (97.7 °F)  Pulse:  [57-88] 57  Resp:  [16-18] 16  BP: (106-122)/(53-66) 122/57  SpO2:  [92 %-95 %] 94 %    Physical Exam  Vitals and nursing note reviewed.   Constitutional:       Appearance: He is obese.   HENT:      Head: Normocephalic and atraumatic.      Nose: No congestion.      Mouth/Throat:      Mouth: Mucous membranes are moist.   Eyes:      Extraocular Movements: Extraocular movements intact.      Conjunctiva/sclera: Conjunctivae normal.       Pupils: Pupils are equal, round, and reactive to light.   Cardiovascular:      Rate and Rhythm: Normal rate. Rhythm irregular.   Pulmonary:      Effort: Pulmonary effort is normal. No respiratory distress.      Breath sounds: No wheezing or rales.   Abdominal:      General: There is no distension.      Tenderness: There is no abdominal tenderness. There is no guarding or rebound.      Comments: Wound vac in place    Musculoskeletal:      Cervical back: No muscular tenderness.      Right lower leg: No edema.      Left lower leg: No edema.   Skin:     General: Skin is warm and dry.      Findings: No bruising.   Neurological:      General: No focal deficit present.      Mental Status: He is alert and oriented to person, place, and time.      Cranial Nerves: No cranial nerve deficit.      Motor: No weakness.   Psychiatric:         Mood and Affect: Mood is not anxious.         Speech: Speech normal.         Thought Content: Thought content is not paranoid or delusional. Thought content does not include homicidal or suicidal ideation. Thought content does not include homicidal or suicidal plan.         Judgment: Judgment is not impulsive.       Fluids    Intake/Output Summary (Last 24 hours) at 6/22/2021 0804  Last data filed at 6/22/2021 0530  Gross per 24 hour   Intake --   Output 1475 ml   Net -1475 ml       Laboratory  Recent Labs     06/21/21  0425   WBC 10.1   RBC 3.75*   HEMOGLOBIN 10.6*   HEMATOCRIT 33.8*   MCV 90.1   MCH 28.3   MCHC 31.4*   RDW 51.5*   PLATELETCT 256   MPV 11.1     Recent Labs     06/21/21  0425   SODIUM 141   POTASSIUM 3.4*   CHLORIDE 108   CO2 24   GLUCOSE 90   BUN 26*   CREATININE 1.73*   CALCIUM 8.5                   Imaging  US-RENAL   Final Result      1.  Low-level echogenic debris in the urinary bladder is noted which could indicate UTI or blood within the bladder.      2.  No hydronephrosis.      IR-MIDLINE CATHETER INSERTION WO GUIDANCE > AGE 3   Final Result                   Ultrasound-guided midline placement performed by qualified nursing staff    as above.          EC-ECHOCARDIOGRAM COMPLETE W/ CONT   Final Result      DX-CHEST-PORTABLE (1 VIEW)   Final Result      1.  Minor asymmetric interstitial opacity in the right lower lobe which could represent minor interstitial edema, pneumonia, or fibrotic change.      2.  No left lung consolidation.      3.  No evidence of congestive failure.      [id number] VIEW   Final Result         No specific finding to suggest small bowel obstruction.      IU-EMOCFPD-7 VIEW   Final Result         No significant interval change.      DX-CHEST-LIMITED (1 VIEW)   Final Result         Diffuse interstitial prominence could relate to mild pulmonary edema or atypical infection      [id number] VIEW   Final Result      Increased colonic gas without definite bowel obstruction.      US-RENAL   Final Result      No evidence of hydronephrosis.      Lobulated kidneys bilaterally.      CT-ABDOMEN-PELVIS WITH   Final Result      1.  There is no evidence of small bowel obstruction.   2.  There is a left lower quadrant ostomy.   3.  There is fluid distention of the colon distal to the surgical material in the left mid descending colon extending all the way to the rectum. There is no pneumatosis or free air.   4.  There is cholelithiasis without biliary dilatation.   5.  There has been interval removal of the drainage catheter anterior to the spleen with minimal hypodense area in the anterior spleen again noted. There is no new fluid collection.      IR-US GUIDED PIV   Final Result    Ultrasound-guided PERIPHERAL IV INSERTION performed by    qualified nursing staff as above.      EC-ECHOCARDIOGRAM COMPLETE W/O CONT   Final Result      DX-LUMBAR SPINE-2 OR 3 VIEWS   Final Result      Moderate compression deformity of T11 is new compared to 2018.      Mild wedge deformity of T12 is unchanged.      Degenerative changes including facet arthropathy.      Mild  retrolisthesis of L5 on S1 and L3 on L4.              Assessment/Plan  * Debility- (present on admission)  Assessment & Plan  PT and OT; SNF  vit d level low 5/29- start replacement   Patient has generalized weakness  Poor prognosis    Normocytic anemia- (present on admission)  Assessment & Plan  6/18- hgb continues slowly to drop  Pt on chronic steroid which increase risk for stress ulcers   Start omeprazole 40 mg BID   Iron studies, TSH/B/FOLAte, occult studies normal. Mix anemia of chronic inflammation and mild iron def combination     Leukocytosis- (present on admission)  Assessment & Plan  Around 12-13  No fever or chills  Continue his medications  Consider repeat CT scan for abdomen if needed  6/18- normal. Continue to monitor. Consider abdomen CT if signs of infection      Obstructive uropathy- (present on admission)  Assessment & Plan  Bladder scan and straight cath prn    Permanent atrial fibrillation (HCC)- (present on admission)  Assessment & Plan  6/7- a-fib persistent. Cardiology consulted again to start amiodarone   Stop digoxin high risk toxicity with SRUTHI   Continue eliquis   6/20- better. Metoprolol restarted 6/19. Continue to monitor     Chronic venous insufficiency of lower extremity- (present on admission)  Assessment & Plan  compression stockings.    Chronic heart failure with preserved ejection fraction (HCC)- (present on admission)  Assessment & Plan  Toprol was held due to low BP and orthostatic hypo  C/w Atorvastatin  Monitor fluid status and Cr  6/16- restarted lasix  6/19- restarted metoprolol  Continue to monitor I&O       Type 2 diabetes mellitus with hyperglycemia, with long-term current use of insulin (HCC)- (present on admission)  Assessment & Plan  Follow bmp  Currently not requiring Insulin    Major depressive disorder, recurrent, severe with psychotic features (HCC)- (present on admission)  Assessment & Plan  Geodon, Paxil, Klonopin  Psychiatry following     Colostomy present (HCC)-  (present on admission)  Assessment & Plan  Exploratory laparotomy with colostomy takedown and closure 5/27/2021  Segmental colectomy, colostomy creation, mobilization of splenic flexure, wound VAC placement, irrigation and debridement of flank wound on 11/10/2020.  Improving on his leukocytosis  No fever or chills and wound VAC in place   6/15- off antibiotic   6/21- continue to monitor. Plan Dr. Desouza to see pt on 6/23 with wound team         Hypothyroidism- (present on admission)  Assessment & Plan  Increased Levothyroxine to 125 mcg  Check TSH on 6/29/2021    SRUTHI (acute kidney injury) (HCC)- (present on admission)  Assessment & Plan  6/1- resolved   FeNa showed prerenal  6/18- remain stable on lasix. Continue to monitor   6/19- stable    Adrenal insufficiency (HCC)- (present on admission)  Assessment & Plan  Likely  Redcued corticef 5 mg BID 6/13  And then corticef 5 mg daily on 7/4  He need endo evaluation as outpatient    Suicidal ideation- (present on admission)  Assessment & Plan  6/4- legal hold discontinued.   Possible SNF placement     Orthostatic hypotension- (present on admission)  Assessment & Plan  Stop florinef.  Florinef increase risk of water retention, poor wound healing and he is already on hydrocortisone   6/20-pt was started on lasix and metoprolol. He is tolerating them well. BP stable off florinef . No need for midodrine     Mixed hyperlipidemia- (present on admission)  Assessment & Plan  Atorvastatin.     Lower limb amputation, great toe (HCC)- (present on admission)  Assessment & Plan  Monitor  No signs of infection     Hypomagnesemia- (present on admission)  Assessment & Plan  Monitor and replace as needed     Class 2 severe obesity with serious comorbidity and body mass index (BMI) of 35.0 to 35.9 in adult (HCC)- (present on admission)  Assessment & Plan  Body mass index is 35.19 kg/m².    Hypokalemia- (present on admission)  Assessment & Plan  Monitor and replete as needed       VTE  prophylaxis: eliquis       Interventions to be considered in all patients in order to minimize the risk of delirium.   -do not disturb patient (vitals or lab draws) between the hours of 10 PM and 6 AM.  -ideally the patient should not sleep during the day and we should avoid day time naps.   -up in chair for meals  -ambulate at least three times daily, as able  -watch for constipation  -timed voiding - ask patient is she would like to go to the bathroom q 2-3 hours, except during the do not disturb hours.   -remove all necessary lines (central lines, peripheral IVs, feeding tubes, frye catheters)  -unless patient has shown harm to self or others I would recommend against use of restraints - either chemical or physical (antipsychotics)   -minimize polypharmacy, do not dose medication during sleep hours

## 2021-06-22 NOTE — CARE PLAN
The patient is Stable - Low risk of patient condition declining or worsening    Shift Goals  Clinical Goals: Patient's pain will be controlled   Patient Goals: rest   Family Goals: n/a    Progress made toward(s) clinical / shift goals:        Problem: Knowledge Deficit - Standard  Goal: Patient and family/care givers will demonstrate understanding of plan of care, disease process/condition, diagnostic tests and medications  Outcome: Progressing     Problem: Depression  Goal: Patient and family/caregiver will verbalize accurate information about at least two of the possible causes of depression, three-four of the signs and symptoms of depression  Outcome: Progressing       Patient is not progressing towards the following goals:

## 2021-06-23 LAB
ANION GAP SERPL CALC-SCNC: 9 MMOL/L (ref 7–16)
BUN SERPL-MCNC: 28 MG/DL (ref 8–22)
CALCIUM SERPL-MCNC: 8.4 MG/DL (ref 8.5–10.5)
CHLORIDE SERPL-SCNC: 103 MMOL/L (ref 96–112)
CO2 SERPL-SCNC: 24 MMOL/L (ref 20–33)
CREAT SERPL-MCNC: 1.56 MG/DL (ref 0.5–1.4)
ERYTHROCYTE [DISTWIDTH] IN BLOOD BY AUTOMATED COUNT: 52.2 FL (ref 35.9–50)
GLUCOSE SERPL-MCNC: 90 MG/DL (ref 65–99)
HCT VFR BLD AUTO: 32.1 % (ref 42–52)
HGB BLD-MCNC: 10 G/DL (ref 14–18)
MCH RBC QN AUTO: 28.1 PG (ref 27–33)
MCHC RBC AUTO-ENTMCNC: 31.2 G/DL (ref 33.7–35.3)
MCV RBC AUTO: 90.2 FL (ref 81.4–97.8)
PLATELET # BLD AUTO: 225 K/UL (ref 164–446)
PMV BLD AUTO: 11.3 FL (ref 9–12.9)
POTASSIUM SERPL-SCNC: 2.9 MMOL/L (ref 3.6–5.5)
RBC # BLD AUTO: 3.56 M/UL (ref 4.7–6.1)
SODIUM SERPL-SCNC: 136 MMOL/L (ref 135–145)
WBC # BLD AUTO: 9.9 K/UL (ref 4.8–10.8)

## 2021-06-23 PROCEDURE — A9270 NON-COVERED ITEM OR SERVICE: HCPCS | Performed by: INTERNAL MEDICINE

## 2021-06-23 PROCEDURE — 97608 NEG PRS WND THER NDME>50SQCM: CPT

## 2021-06-23 PROCEDURE — A9270 NON-COVERED ITEM OR SERVICE: HCPCS | Performed by: FAMILY MEDICINE

## 2021-06-23 PROCEDURE — 700102 HCHG RX REV CODE 250 W/ 637 OVERRIDE(OP): Performed by: INTERNAL MEDICINE

## 2021-06-23 PROCEDURE — 770001 HCHG ROOM/CARE - MED/SURG/GYN PRIV*

## 2021-06-23 PROCEDURE — 700102 HCHG RX REV CODE 250 W/ 637 OVERRIDE(OP): Performed by: FAMILY MEDICINE

## 2021-06-23 PROCEDURE — 80048 BASIC METABOLIC PNL TOTAL CA: CPT

## 2021-06-23 PROCEDURE — 85027 COMPLETE CBC AUTOMATED: CPT

## 2021-06-23 PROCEDURE — 99232 SBSQ HOSP IP/OBS MODERATE 35: CPT | Performed by: FAMILY MEDICINE

## 2021-06-23 RX ORDER — POTASSIUM CHLORIDE 20 MEQ/1
20 TABLET, EXTENDED RELEASE ORAL 2 TIMES DAILY
Status: DISCONTINUED | OUTPATIENT
Start: 2021-06-23 | End: 2021-06-24

## 2021-06-23 RX ADMIN — TAMSULOSIN HYDROCHLORIDE 0.4 MG: 0.4 CAPSULE ORAL at 09:32

## 2021-06-23 RX ADMIN — PAROXETINE HYDROCHLORIDE 20 MG: 20 TABLET, FILM COATED ORAL at 09:32

## 2021-06-23 RX ADMIN — METOPROLOL TARTRATE 25 MG: 25 TABLET, FILM COATED ORAL at 04:41

## 2021-06-23 RX ADMIN — POTASSIUM CHLORIDE 20 MEQ: 1500 TABLET, EXTENDED RELEASE ORAL at 17:37

## 2021-06-23 RX ADMIN — OMEPRAZOLE 40 MG: 20 CAPSULE, DELAYED RELEASE ORAL at 04:40

## 2021-06-23 RX ADMIN — Medication 1000 UNITS: at 04:41

## 2021-06-23 RX ADMIN — OXYCODONE 5 MG: 5 TABLET ORAL at 09:37

## 2021-06-23 RX ADMIN — CLONAZEPAM 0.5 MG: 0.5 TABLET ORAL at 09:31

## 2021-06-23 RX ADMIN — AMIODARONE HYDROCHLORIDE 200 MG: 200 TABLET ORAL at 17:36

## 2021-06-23 RX ADMIN — AMIODARONE HYDROCHLORIDE 200 MG: 200 TABLET ORAL at 04:41

## 2021-06-23 RX ADMIN — OXYCODONE 5 MG: 5 TABLET ORAL at 13:05

## 2021-06-23 RX ADMIN — ZIPRASIDONE HYDROCHLORIDE 60 MG: 60 CAPSULE ORAL at 21:08

## 2021-06-23 RX ADMIN — ZIPRASIDONE HYDROCHLORIDE 60 MG: 60 CAPSULE ORAL at 09:37

## 2021-06-23 RX ADMIN — APIXABAN 5 MG: 5 TABLET, FILM COATED ORAL at 04:40

## 2021-06-23 RX ADMIN — HYDROCORTISONE 5 MG: 5 TABLET ORAL at 04:41

## 2021-06-23 RX ADMIN — OXYCODONE 5 MG: 5 TABLET ORAL at 17:37

## 2021-06-23 RX ADMIN — METOPROLOL TARTRATE 25 MG: 25 TABLET, FILM COATED ORAL at 17:36

## 2021-06-23 RX ADMIN — CLONAZEPAM 1 MG: 1 TABLET ORAL at 17:36

## 2021-06-23 RX ADMIN — LEVOTHYROXINE SODIUM 125 MCG: 0.12 TABLET ORAL at 04:40

## 2021-06-23 RX ADMIN — APIXABAN 5 MG: 5 TABLET, FILM COATED ORAL at 17:36

## 2021-06-23 RX ADMIN — ATORVASTATIN CALCIUM 40 MG: 40 TABLET, FILM COATED ORAL at 17:36

## 2021-06-23 RX ADMIN — OMEPRAZOLE 40 MG: 20 CAPSULE, DELAYED RELEASE ORAL at 17:37

## 2021-06-23 RX ADMIN — HYDROCORTISONE 5 MG: 5 TABLET ORAL at 14:44

## 2021-06-23 RX ADMIN — POTASSIUM CHLORIDE 20 MEQ: 1500 TABLET, EXTENDED RELEASE ORAL at 04:41

## 2021-06-23 ASSESSMENT — ENCOUNTER SYMPTOMS
FOCAL WEAKNESS: 0
HEARTBURN: 0
SPEECH CHANGE: 0
CHILLS: 0
DIARRHEA: 0
WHEEZING: 0
HALLUCINATIONS: 0
WEAKNESS: 1
FEVER: 0
MYALGIAS: 0
DIAPHORESIS: 0
DIZZINESS: 0
FLANK PAIN: 0
ABDOMINAL PAIN: 0
DEPRESSION: 0
BLURRED VISION: 0
SORE THROAT: 0
SHORTNESS OF BREATH: 0
BACK PAIN: 0
NAUSEA: 0
SENSORY CHANGE: 0
NECK PAIN: 0
COUGH: 0
PALPITATIONS: 0
NERVOUS/ANXIOUS: 0
VOMITING: 0
HEADACHES: 0

## 2021-06-23 ASSESSMENT — PAIN DESCRIPTION - PAIN TYPE
TYPE: ACUTE PAIN

## 2021-06-23 ASSESSMENT — FIBROSIS 4 INDEX: FIB4 SCORE: 1.09

## 2021-06-23 NOTE — CARE PLAN
The patient is Stable - Low risk of patient condition declining or worsening    Shift Goals  Clinical Goals: (P) Rest  Patient Goals: rest   Family Goals: n/a    Progress made toward(s) clinical / shift goals: Explained plan of care for the night and goals of care for the shift including turning, monitoring urine output, and re-applying mepilexes to bony prominences.  Problem: Knowledge Deficit - Standard  Goal: Patient and family/care givers will demonstrate understanding of plan of care, disease process/condition, diagnostic tests and medications  Outcome: Progressing     Problem: Urinary - Renal Perfusion  Goal: Ability to achieve and maintain adequate renal perfusion and functioning will improve  Outcome: Progressing     Problem: Physical Regulation  Goal: Diagnostic test results will improve  Outcome: Progressing  Goal: Signs and symptoms of infection will decrease  Outcome: Progressing

## 2021-06-23 NOTE — PROGRESS NOTES
Report received.  Assessment complete.  A&O x 4. Patient calls appropriately.  Patient ambulates with max assist. Bed alarm on.   Patient has 0/10 pain.   Denies N&V. Tolerating cardiac low fiber diet.  Midline incisions to [place], no leaks detected.  + void into purewick, + flatus, - BM.  Patient denies SOB.  SCD's off.   Q2 turns in place  Review plan with of care with patient. Call light and personal belongings with in reach. Hourly rounding in place. All needs met at this time.

## 2021-06-23 NOTE — PROGRESS NOTES
Received report from night nurse, assumed care of PT  /62   Pulse 66   Temp 37 °C (98.6 °F) (Temporal)   Resp 18   Ht 1.829 m (6')   Wt (!) 146 kg (321 lb 3.4 oz)   SpO2 95%   BMI 43.56 kg/m²   Pt turns with x 1 assist. Bed alarm on  PT on RA denies SOB    PT reports 9/10 pain. Resolved with medication (See MAR)  Right Upper Arm Midline catheter  in place, pending removal in 2 days dressing CDI   Wound VAC in RUQ noted, no leaks, CDI   Pure wick placed, PT tolerating well    Bed locked in lowest position  Call light within reach  All needs met at this time

## 2021-06-23 NOTE — CARE PLAN
The patient is Stable - Low risk of patient condition declining or worsening    Shift Goals  Clinical Goals: Rest  Patient Goals: rest   Family Goals: n/a    Progress made toward(s) clinical / shift goals:      Patient is not progressing towards the following goals:      Problem: Mobility  Goal: Patient's capacity to carry out activities will improve  Outcome:     Pt will get OOB.

## 2021-06-23 NOTE — WOUND TEAM
[facility]  Inpatient Services  Wound and Skin Care Progress Note    Admission Date: 2/9/2021     Last order of IP CONSULT TO WOUND CARE was found on 3/18/2021 from Hospital Encounter on 2/9/2021     HPI, PMH, SH: Reviewed    Past Surgical History:   Procedure Laterality Date   • PB EXPLORATORY OF ABDOMEN  5/27/2021    Procedure: LAPAROTOMY, EXPLORATORY;  Surgeon: Kin Desouza D.O.;  Location: [facility];  Service: General   • COLOSTOMY TAKEDOWN  5/27/2021    Procedure: COLOSTOMY TAKEDOWN AND CLOSURE;  Surgeon: Kin Desouza D.O.;  Location: [facility];  Service: General   • COLECTOMY N/A 11/10/2020    Procedure: COLECTOMY-SEGMENTAL, COLOSTOMY, MOBILIZATION OF SPLENIC FLEXURE, WOUND VAC PLACEMENT, IRRIGATION AND DEBRIDMENT FLANK WOUND;  Surgeon: Kin Desouza D.O.;  Location: [facility];  Service: General   • ZZZ CARDIAC CATH  07/21/2018    EF 45%, normal coronaries.   • IRRIGATION & DEBRIDEMENT ORTHO Right 2/19/2018    Procedure: IRRIGATION & DEBRIDEMENT ORTHO-FOOT;  Surgeon: Kirby Lopez M.D.;  Location: [facility];  Service: Orthopedics   • TOE AMPUTATION Right 1/17/2018    Procedure: TOE AMPUTATION-1ST RAY;  Surgeon: Doug Delong M.D.;  Location: [facility];  Service: Orthopedics   • TOE AMPUTATION Right 11/10/2017    Procedure: TOE AMPUTATION;  Surgeon: Osorio Leo M.D.;  Location: [facility];  Service: Orthopedics     Social History     Tobacco Use   • Smoking status: Never Smoker   • Smokeless tobacco: Never Used   Substance Use Topics   • Alcohol use: No     Chief Complaint   Patient presents with   • Syncope     X1 this morning when standing up to go to bathroom     Diagnosis: Syncope and collapse [R55]  Suicidal ideation [R45.851]  Sepsis due to Klebsiella pneumoniae (HCC) [A41.4]    Unit where seen by Wound Team: T418/00     WOUND CONSULT/FOLLOW UP RELATED TO: abdominal wound    WOUND HISTORY:  Pt is an older  gentleman well known to [facility] wound team 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 Pt currently admitted since 2/9/21 related to SI concerns due to body image issues related to colostomy.      *Pt underwent surgical reversal of colostomy with Dr. Desouza on 5/20/21. MLI was re-opened at that time and was then closed with interrupted staples and packing. Unfortunately MLI continued to Dehisce and therefore wound team was consulted to further manage MLI with dakins packing.     6/3 Wound vac placed    WOUND ASSESSMENT/LDA      Negative Pressure Wound Therapy 06/03/21 Abdomen (Active)   Vacuum Serial Number [id number] 06/14/21 0900   NPWT Pump Mode / Pressure Setting Ulta;125 mmHg    Dressing Type Medium;Black Foam (Regular)    Number of Foam Pieces Used 3    Canister Changed No    Output (mL) 90 mL    NEXT Dressing Change/Treatment Date 06/25/21    WOUND NURSE ONLY - Time Spent with Patient (mins) 60          Wound 05/27/21 Full Thickness Wound Abdomen (Active)   Wound Image     06/16/21 1200   Site Assessment Red;Pink;Early/partial granulation    Periwound Assessment Red;Pink    Margins Attached edges    Closure Secondary intention    Drainage Amount Scant    Drainage Description Serosanguineous    Treatments Cleansed;Site care    Wound Cleansing Approved Wound Cleanser    Periwound Protectant Skin Protectant Wipes to Periwound;Paste Ring    Dressing Cleansing/Solutions Not Applicable    Dressing Options Wound Vac;Hydrofiber Silver;Hydrocolloid Thin    Dressing Changed Changed    Dressing Status Clean;Dry;Intact    Dressing Change/Treatment Frequency Monday, Wednesday, Friday, and As Needed    NEXT Dressing Change/Treatment Date 06/25/21    NEXT Weekly Photo (Inpatient Only) 06/25/21    Non-staged Wound Description Full thickness    Wound Length (cm) 17 cm    Wound Width (cm) 9.4 cm    Wound Depth (cm) 2.5 cm    Wound Surface Area (cm^2) 159.8 cm^2    Wound Volume (cm^3) 399.5 cm^3    Wound Healing %  -388    Tunneling (cm) 1 cm    Tunneling Clock Position of Wound 12    Tunneling - 2nd Location (cm) 0.7 cm    Tunneling Clock Position of Wound - 2nd Location 6    Shape Linear    Wound Odor None    Pulses N/A    Exposed Structures Fascia;Sutures    WOUND NURSE ONLY - Time Spent with Patient (mins) 60      Vascular:    SHAYAN:   No results found.    Lab Values:    Lab Results   Component Value Date/Time    WBC 9.9 2021 03:20 AM    RBC 3.56 (L) 2021 03:20 AM    HEMOGLOBIN 10.0 (L) 2021 03:20 AM    HEMATOCRIT 32.1 (L) 2021 03:20 AM    CREACTPROT 0.48 06/10/2021 04:05 AM    SEDRATEWES 13 10/23/2018 08:02 AM    HBA1C 5.2 2021 01:30 AM      Culture Results show:  No results found for this or any previous visit (from the past 720 hour(s)).    Pain Level/Medicated:  Tolerated well without pain medication.      INTERVENTIONS BY WOUND TEAM:  Chart and images reviewed. Discussed with bedside RN. All areas of concern (based on picture review, LDA review and discussion with bedside RN) have been thoroughly assessed. Documentation of areas based on significant findings. This RN in to assess patient. Performed standard wound care which includes appropriate positioning, dressing removal and non-selective debridement. Pictures and measurements obtained weekly if/when required.              ABD:  Preparation for Dressing removal:  Removed without difficulty.    Cleansed with: Wound cleanser and gauze   Sharp debridement: 3 staples from distal end removed by Dr. Desouza  Joya wound: Cleansed with Wound cleanser and gauze, No sting skin and paste ring x 2, aquacel ag to distal and proximal staples. Hydrocolloid thin to deroofed bullaes  Primary Dressin pieces of regular black foam to wound bed.     Secondary (Outer) Dressing:  Sealed with drape, buttock and TRAC pad applied.     Interdisciplinary consultation: Patient, Bedside RN (Carmelina), General Surgery (Dr. Desouza)     EVALUATION / RATIONALE FOR  "TREATMENT:  Most Recent Date:  6/23/21: wound continues to progress, transitioned to regular vac and continued to pull edges together.     6/21/21: Edges pulled together with jones by Dr. Rooney during change on Friday. Discussed with Lyly today. MD unable to be bedside today. Will continue with veraflo hopefully will be able to transition to regular vac at next visit to further assist with edges being pulled together. Meet with Dr. Rooney on Wednesday at 8am to assess wound.  6/18/21: continuing to decrease in slough in wound bed. One suture towards proximal end visible, per MD rooney, leave in place but monitor. Staples added to superior and inferior ends to assist with closure by MD. Continuing to pull edges together, MD will reassess on Monday with wound team to monitor for improvement and decide if surgical I&D is needed or if appropriate to switch to regular NPWT dressing instead of VF.   6/16/21: Wound with decreased slough. Wound appears to be \"flattening out.\" Veraflo is assisting with cleansing wound but is making pulling edges together difficult. Discussed with Dr. Rooney. Will plan to Meet with Dr. Rooney on Friday at 8am to assess wound, Dr. Rooney to pull superior and inferior borders together and staple to assist with closure.  6/14/21: Wound continues to granulate. Small amount of slough over fascial line. Discussed with Dr. Rooney. Plan to continue with veraflo, settings decreased. Will evaluate the wound with Dr. Rooney on Wednesday at 9am. Per MD may consider OR washout with staples to distal proximal aspect to assist in wound closure vs. Adding staples at bedside.   06/11/21:  Wound looks good, minimal slough mainly to fascial line sutures.  VF grey foam layer to assist in debridement of slough.  Dr. Rooney to re-assess Monday and do surgical debridement if needed next week.   06/09/21:  Wound continues to progress, pull edges together to lessen width of wound. Dr. Rooney would " like to see wound on Friday 6/7: Pt's wound appears to be improving per measurements and pictures. There is granulation tissue present.   06/5/21: Wound bed improving with increased granulation tissue.  Wound edges approximated prior to drape application.  NPWT continued.    06/03/21:  Fascial sutures intact, wound VAC placed with DR. Desouza.    6/1/21: Wound appears  than yesterdays pictures. Discussed with Dr. Desouza, plan to continue dakins TID for 48hrs, CT scan ordered today. Will re-evaluate on Thursday for potential vac placement.   5/31/21: Wound initially with interrupted staples and packing now with dehiscence. Per Dr. Baeza request wound team initiated dakins packing. Pt may benefit from NPWT to assist with closure.   5/8/21: wound nearly healed. Does not require advanced wound care. Wound team signing off and nursing to re consult if site worsens. Continue with dressing care per order.  4/21/21: abd wound improving. Nursing to continue dsg changes per order.  4/15: Right posterior thigh DTI now resolved. Pink and blanching. Abdominal wound to surface level continuing with dressing POC.  4/7/21: Thigh wound nearly healed and will likely be resolved at next assessment. Abdomen wound appears larger. Continued with Arti to move through inflammatory phase. Hydrofera blue for its bacteriostatic properties and to assist in drainage management.   03/31/21: thigh wound healing well now to a stage 2.  Continue current plan.  All interventions in place.   ABD more denuded and bloody.  Arti to assist in moving wound through inflammatory phase.   3/23/21: Right posterior thigh wound remains a DTI but appears to be evolving into a stage 2. Wound team was previously consulted regarding sacral wound which appeared to be a skin tear however skin now appears to be intact however discolored. Does not appear to be pressure related as pt is on a waffle, self mobile and sacral mepilex is in use. Area is also not  a normal pressure injury shape. Wound team will continue to follow.   3/18/21: Pt with linear DTIs to R posterior thigh from lying on top of frye cath tubing. Offloading precautions ordered. Wound team to continue following.     Goals: Steady decrease in wound area and depth weekly.    WOUND TEAM PLAN OF CARE ([X] for frequency of wound follow up,):   Nursing to follow orders written for wound care. Contact wound team if area fails to progress, deteriorates or with any questions/concerns  Dressing changes by wound team:                   Follow up 3 times weekly:                NPWT change 3 times weekly:   X  Follow up 1-2 times weekly:     Follow up Bi-Monthly:                   Follow up as needed:   Other (explain):     NURSING PLAN OF CARE ORDERS (X):  Dressing changes: See Dressing Care orders: X  Skin care: See Skin Care orders: x  RN Prevention Protocol: x  Rectal tube care: See Rectal Tube Care orders:   Other orders:     Anticipated discharge plans:   LTACH:        SNF/Rehab:                  Home Health Care:           Outpatient Wound Center:            Self/Family Care:        Other:   Group Home

## 2021-06-23 NOTE — PROGRESS NOTES
3 RN skin check with ELISA sEqueda.    Generalized fragility.  MLI with a WV, no leaks.  Small bruising around MLI.  LLQ transverse incision, CHARLEY no drainage.  Scattered abrasions to RLE, no drainage.  Skin tear to L buttocks, no drainage & CHARLEY.  Perineum & saccrum both red and blanching, barrier cream applied.  R toe missing from previous amputation, CDI.    All other skin intact. No signs of skin breakdown over bony prominences.    Devices in place: RUE midline  Interventions in place: Bilateral elbow mepilexes, bilateral heel mepilexes, waffle overlay, q2 turns

## 2021-06-23 NOTE — DISCHARGE PLANNING
Anticipated Discharge Disposition: SNF    Action: Patient discussed in IDT rounds. Per bedside RN the patient is on a veraflow wound vac. PT/OT still recommending post acute placement. Patient was denied by [facility]. The patient has now been declined by all SNF choices. Per MD the patient does not have enough needs for a LTAC.     Barriers to Discharge: SNF acceptance, Medicaid    Plan: Follow up with medical team.

## 2021-06-24 LAB
ANION GAP SERPL CALC-SCNC: 10 MMOL/L (ref 7–16)
BASOPHILS # BLD AUTO: 0.5 % (ref 0–1.8)
BASOPHILS # BLD: 0.05 K/UL (ref 0–0.12)
BUN SERPL-MCNC: 25 MG/DL (ref 8–22)
CALCIUM SERPL-MCNC: 8.1 MG/DL (ref 8.5–10.5)
CHLORIDE SERPL-SCNC: 105 MMOL/L (ref 96–112)
CO2 SERPL-SCNC: 23 MMOL/L (ref 20–33)
CREAT SERPL-MCNC: 1.65 MG/DL (ref 0.5–1.4)
EOSINOPHIL # BLD AUTO: 0.44 K/UL (ref 0–0.51)
EOSINOPHIL NFR BLD: 4.8 % (ref 0–6.9)
ERYTHROCYTE [DISTWIDTH] IN BLOOD BY AUTOMATED COUNT: 52.4 FL (ref 35.9–50)
GLUCOSE SERPL-MCNC: 108 MG/DL (ref 65–99)
HCT VFR BLD AUTO: 31.3 % (ref 42–52)
HGB BLD-MCNC: 10 G/DL (ref 14–18)
IMM GRANULOCYTES # BLD AUTO: 0.03 K/UL (ref 0–0.11)
IMM GRANULOCYTES NFR BLD AUTO: 0.3 % (ref 0–0.9)
LYMPHOCYTES # BLD AUTO: 2.37 K/UL (ref 1–4.8)
LYMPHOCYTES NFR BLD: 25.6 % (ref 22–41)
MAGNESIUM SERPL-MCNC: 1.5 MG/DL (ref 1.5–2.5)
MCH RBC QN AUTO: 28.7 PG (ref 27–33)
MCHC RBC AUTO-ENTMCNC: 31.9 G/DL (ref 33.7–35.3)
MCV RBC AUTO: 89.9 FL (ref 81.4–97.8)
MONOCYTES # BLD AUTO: 0.72 K/UL (ref 0–0.85)
MONOCYTES NFR BLD AUTO: 7.8 % (ref 0–13.4)
NEUTROPHILS # BLD AUTO: 5.64 K/UL (ref 1.82–7.42)
NEUTROPHILS NFR BLD: 61 % (ref 44–72)
NRBC # BLD AUTO: 0 K/UL
NRBC BLD-RTO: 0 /100 WBC
NT-PROBNP SERPL IA-MCNC: 3258 PG/ML (ref 0–125)
PHOSPHATE SERPL-MCNC: 4.1 MG/DL (ref 2.5–4.5)
PLATELET # BLD AUTO: 209 K/UL (ref 164–446)
PMV BLD AUTO: 11.4 FL (ref 9–12.9)
POTASSIUM SERPL-SCNC: 3.1 MMOL/L (ref 3.6–5.5)
RBC # BLD AUTO: 3.48 M/UL (ref 4.7–6.1)
SODIUM SERPL-SCNC: 138 MMOL/L (ref 135–145)
WBC # BLD AUTO: 9.3 K/UL (ref 4.8–10.8)

## 2021-06-24 PROCEDURE — A9270 NON-COVERED ITEM OR SERVICE: HCPCS | Performed by: INTERNAL MEDICINE

## 2021-06-24 PROCEDURE — 700102 HCHG RX REV CODE 250 W/ 637 OVERRIDE(OP): Performed by: INTERNAL MEDICINE

## 2021-06-24 PROCEDURE — 83735 ASSAY OF MAGNESIUM: CPT

## 2021-06-24 PROCEDURE — 85025 COMPLETE CBC W/AUTO DIFF WBC: CPT

## 2021-06-24 PROCEDURE — 97535 SELF CARE MNGMENT TRAINING: CPT

## 2021-06-24 PROCEDURE — 99232 SBSQ HOSP IP/OBS MODERATE 35: CPT | Performed by: FAMILY MEDICINE

## 2021-06-24 PROCEDURE — 700111 HCHG RX REV CODE 636 W/ 250 OVERRIDE (IP): Performed by: FAMILY MEDICINE

## 2021-06-24 PROCEDURE — 770001 HCHG ROOM/CARE - MED/SURG/GYN PRIV*

## 2021-06-24 PROCEDURE — 80048 BASIC METABOLIC PNL TOTAL CA: CPT

## 2021-06-24 PROCEDURE — 83880 ASSAY OF NATRIURETIC PEPTIDE: CPT

## 2021-06-24 PROCEDURE — A9270 NON-COVERED ITEM OR SERVICE: HCPCS | Performed by: FAMILY MEDICINE

## 2021-06-24 PROCEDURE — 302135 SEQUENTIAL COMPRESSION MACHINE: Performed by: FAMILY MEDICINE

## 2021-06-24 PROCEDURE — 700102 HCHG RX REV CODE 250 W/ 637 OVERRIDE(OP): Performed by: FAMILY MEDICINE

## 2021-06-24 PROCEDURE — 84100 ASSAY OF PHOSPHORUS: CPT

## 2021-06-24 RX ORDER — MAGNESIUM SULFATE HEPTAHYDRATE 40 MG/ML
2 INJECTION, SOLUTION INTRAVENOUS ONCE
Status: COMPLETED | OUTPATIENT
Start: 2021-06-24 | End: 2021-06-24

## 2021-06-24 RX ORDER — POTASSIUM CHLORIDE 20 MEQ/1
40 TABLET, EXTENDED RELEASE ORAL 2 TIMES DAILY
Status: DISCONTINUED | OUTPATIENT
Start: 2021-06-24 | End: 2021-06-26

## 2021-06-24 RX ADMIN — AMIODARONE HYDROCHLORIDE 200 MG: 200 TABLET ORAL at 18:25

## 2021-06-24 RX ADMIN — PAROXETINE HYDROCHLORIDE 20 MG: 20 TABLET, FILM COATED ORAL at 08:05

## 2021-06-24 RX ADMIN — AMIODARONE HYDROCHLORIDE 200 MG: 200 TABLET ORAL at 05:23

## 2021-06-24 RX ADMIN — HYDROCORTISONE 5 MG: 5 TABLET ORAL at 16:41

## 2021-06-24 RX ADMIN — OMEPRAZOLE 40 MG: 20 CAPSULE, DELAYED RELEASE ORAL at 18:25

## 2021-06-24 RX ADMIN — OXYCODONE 10 MG: 5 TABLET ORAL at 18:25

## 2021-06-24 RX ADMIN — ZIPRASIDONE HYDROCHLORIDE 60 MG: 60 CAPSULE ORAL at 21:56

## 2021-06-24 RX ADMIN — APIXABAN 5 MG: 5 TABLET, FILM COATED ORAL at 05:23

## 2021-06-24 RX ADMIN — METOPROLOL TARTRATE 25 MG: 25 TABLET, FILM COATED ORAL at 18:25

## 2021-06-24 RX ADMIN — MAGNESIUM SULFATE 2 G: 2 INJECTION INTRAVENOUS at 08:15

## 2021-06-24 RX ADMIN — APIXABAN 5 MG: 5 TABLET, FILM COATED ORAL at 18:25

## 2021-06-24 RX ADMIN — ZIPRASIDONE HYDROCHLORIDE 60 MG: 60 CAPSULE ORAL at 08:05

## 2021-06-24 RX ADMIN — CLONAZEPAM 1 MG: 1 TABLET ORAL at 18:25

## 2021-06-24 RX ADMIN — OMEPRAZOLE 40 MG: 20 CAPSULE, DELAYED RELEASE ORAL at 05:23

## 2021-06-24 RX ADMIN — CLONAZEPAM 0.5 MG: 0.5 TABLET ORAL at 08:06

## 2021-06-24 RX ADMIN — OXYCODONE 10 MG: 5 TABLET ORAL at 00:31

## 2021-06-24 RX ADMIN — POTASSIUM CHLORIDE 40 MEQ: 1500 TABLET, EXTENDED RELEASE ORAL at 18:25

## 2021-06-24 RX ADMIN — METOPROLOL TARTRATE 25 MG: 25 TABLET, FILM COATED ORAL at 05:23

## 2021-06-24 RX ADMIN — ATORVASTATIN CALCIUM 40 MG: 40 TABLET, FILM COATED ORAL at 18:25

## 2021-06-24 RX ADMIN — OXYCODONE 10 MG: 5 TABLET ORAL at 08:15

## 2021-06-24 RX ADMIN — Medication 1000 UNITS: at 05:23

## 2021-06-24 RX ADMIN — POTASSIUM CHLORIDE 20 MEQ: 1500 TABLET, EXTENDED RELEASE ORAL at 05:23

## 2021-06-24 RX ADMIN — LEVOTHYROXINE SODIUM 125 MCG: 0.12 TABLET ORAL at 05:23

## 2021-06-24 RX ADMIN — OXYCODONE 10 MG: 5 TABLET ORAL at 21:55

## 2021-06-24 RX ADMIN — HYDROCORTISONE 5 MG: 5 TABLET ORAL at 05:24

## 2021-06-24 RX ADMIN — TAMSULOSIN HYDROCHLORIDE 0.4 MG: 0.4 CAPSULE ORAL at 08:05

## 2021-06-24 ASSESSMENT — PAIN DESCRIPTION - PAIN TYPE
TYPE: ACUTE PAIN
TYPE: ACUTE PAIN
TYPE: ACUTE PAIN;CHRONIC PAIN;SURGICAL PAIN
TYPE: ACUTE PAIN;CHRONIC PAIN;SURGICAL PAIN
TYPE: ACUTE PAIN

## 2021-06-24 ASSESSMENT — ENCOUNTER SYMPTOMS
WHEEZING: 0
NERVOUS/ANXIOUS: 0
VOMITING: 0
BLURRED VISION: 0
BACK PAIN: 0
DEPRESSION: 0
CHILLS: 0
HEADACHES: 0
NECK PAIN: 0
COUGH: 0
FLANK PAIN: 0
ABDOMINAL PAIN: 0
PALPITATIONS: 0
FEVER: 0
MYALGIAS: 0
DIZZINESS: 0
HALLUCINATIONS: 0
HEARTBURN: 0
SENSORY CHANGE: 0
SORE THROAT: 0
SPEECH CHANGE: 0
FOCAL WEAKNESS: 0
DIAPHORESIS: 0
WEAKNESS: 1
SHORTNESS OF BREATH: 0
NAUSEA: 0
DIARRHEA: 0

## 2021-06-24 ASSESSMENT — COGNITIVE AND FUNCTIONAL STATUS - GENERAL
HELP NEEDED FOR BATHING: A LOT
DAILY ACTIVITIY SCORE: 16
DRESSING REGULAR LOWER BODY CLOTHING: A LOT
SUGGESTED CMS G CODE MODIFIER DAILY ACTIVITY: CK
DRESSING REGULAR UPPER BODY CLOTHING: A LITTLE
PERSONAL GROOMING: A LITTLE
TOILETING: A LOT

## 2021-06-24 NOTE — PROGRESS NOTES
[facility] Medicine Daily Progress Note    Date of Service  6/23/2021    Chief Complaint  56 y.o. male admitted 2/9/2021 with Syncope.    Hospital Course  Admitted with syncope, he was noted to be bradycardic and hypotensive.  He has known history of permanent atrial fibrillation and he was on diltiazem, digoxin and metoprolol.  These medications were held.  Later on he was restarted on low-dose Toprol and Digoxin.  He was also started on anticoagulation with Eliquis.  He also had a possible history of adrenal insufficiency, he was already on Florinef and midodrine.  Midodrine was discontinued and he was started on cortisol. Further work-up showed that he had gram-negative bacteremia as well as urinary tract infection and acute kidney injury.  He was initially placed on IV Zosyn, later changed to oral Bactrim to finish the course.  He was carefully hydrated with IV fluids.  On admission he also expressed suicidal ideation.  Psychiatry was consulted the case, he was placed on a legal hold.  He was noted to have severe depression.  He has been placed on medication the form of Geodon, Paxil, Klonopin.  He has a colostomy in place, which the patient believes contributes to his severe depression because of ostomy care.  He had history of bowel perforation and splenic fluid collection requiring Segmental colectomy, colostomy creation, mobilization of splenic flexure, wound VAC placement, irrigation and debridement of flank wound on 11/10/2020.  Surgery was consulted on the case, and he underwent exploratory laparotomy and colostomy takedown and closure on 5/27/2021.  Wound VAC was later placed.  Psychiatry continue to follow the patient, his legal hold was discontinued on 6/4/2021    Interval Problem Update  Colostomy - pain controlled  Afib - rate 60-80  SRUTHI - crea 1.5  Low potassium     Consultants/Specialty  Cardiology  Psychiatry  Surgery    Code Status  Full Code    Disposition  SNF     Review of Systems  Review of  Systems   Constitutional: Positive for malaise/fatigue. Negative for chills, diaphoresis and fever.   HENT: Negative for congestion, hearing loss and sore throat.    Eyes: Negative for blurred vision.   Respiratory: Negative for cough, shortness of breath and wheezing.    Cardiovascular: Negative for chest pain, palpitations and leg swelling.   Gastrointestinal: Negative for abdominal pain, diarrhea, heartburn, nausea and vomiting.   Genitourinary: Negative for dysuria, flank pain and hematuria.   Musculoskeletal: Negative for back pain, joint pain, myalgias and neck pain.   Skin: Negative for rash.   Neurological: Positive for weakness. Negative for dizziness, sensory change, speech change, focal weakness and headaches.   Psychiatric/Behavioral: Negative for depression, hallucinations and suicidal ideas. The patient is not nervous/anxious.       Physical Exam  Temp:  [36.2 °C (97.1 °F)-36.4 °C (97.5 °F)] 36.2 °C (97.1 °F)  Pulse:  [65-82] 68  Resp:  [16-17] 17  BP: ()/(50-72) 106/72  SpO2:  [92 %-97 %] 96 %    Physical Exam  Vitals and nursing note reviewed.   Constitutional:       Appearance: He is obese.   HENT:      Head: Normocephalic and atraumatic.      Nose: No congestion.      Mouth/Throat:      Mouth: Mucous membranes are moist.   Eyes:      Extraocular Movements: Extraocular movements intact.      Conjunctiva/sclera: Conjunctivae normal.      Pupils: Pupils are equal, round, and reactive to light.   Cardiovascular:      Rate and Rhythm: Normal rate. Rhythm irregularly irregular.   Pulmonary:      Effort: Pulmonary effort is normal.      Breath sounds: Normal breath sounds.   Abdominal:      General: There is no distension.      Tenderness: There is abdominal tenderness. There is no guarding or rebound.      Comments: Wound vac   Musculoskeletal:      Cervical back: No muscular tenderness.      Right lower leg: Edema present.      Left lower leg: Edema present.   Skin:     General: Skin is warm and  dry.   Neurological:      General: No focal deficit present.      Mental Status: He is alert and oriented to person, place, and time.      Cranial Nerves: No cranial nerve deficit.   Psychiatric:         Mood and Affect: Mood is not anxious or depressed.         Thought Content: Thought content is not paranoid or delusional. Thought content does not include homicidal or suicidal ideation. Thought content does not include homicidal or suicidal plan.       Fluids    Intake/Output Summary (Last 24 hours) at 6/23/2021 1730  Last data filed at 6/23/2021 1200  Gross per 24 hour   Intake 480 ml   Output 810 ml   Net -330 ml       Laboratory  Recent Labs     06/21/21  0425 06/23/21  0320   WBC 10.1 9.9   RBC 3.75* 3.56*   HEMOGLOBIN 10.6* 10.0*   HEMATOCRIT 33.8* 32.1*   MCV 90.1 90.2   MCH 28.3 28.1   MCHC 31.4* 31.2*   RDW 51.5* 52.2*   PLATELETCT 256 225   MPV 11.1 11.3     Recent Labs     06/21/21  0425 06/23/21  0320   SODIUM 141 136   POTASSIUM 3.4* 2.9*   CHLORIDE 108 103   CO2 24 24   GLUCOSE 90 90   BUN 26* 28*   CREATININE 1.73* 1.56*   CALCIUM 8.5 8.4*                   Imaging  US-RENAL   Final Result      1.  Low-level echogenic debris in the urinary bladder is noted which could indicate UTI or blood within the bladder.      2.  No hydronephrosis.      IR-MIDLINE CATHETER INSERTION WO GUIDANCE > AGE 3   Final Result                  Ultrasound-guided midline placement performed by qualified nursing staff    as above.          EC-ECHOCARDIOGRAM COMPLETE W/ CONT   Final Result      DX-CHEST-PORTABLE (1 VIEW)   Final Result      1.  Minor asymmetric interstitial opacity in the right lower lobe which could represent minor interstitial edema, pneumonia, or fibrotic change.      2.  No left lung consolidation.      3.  No evidence of congestive failure.      [id number] VIEW   Final Result         No specific finding to suggest small bowel obstruction.      [id number] VIEW   Final Result         No significant  interval change.      DX-CHEST-LIMITED (1 VIEW)   Final Result         Diffuse interstitial prominence could relate to mild pulmonary edema or atypical infection      [id number] VIEW   Final Result      Increased colonic gas without definite bowel obstruction.      US-RENAL   Final Result      No evidence of hydronephrosis.      Lobulated kidneys bilaterally.      CT-ABDOMEN-PELVIS WITH   Final Result      1.  There is no evidence of small bowel obstruction.   2.  There is a left lower quadrant ostomy.   3.  There is fluid distention of the colon distal to the surgical material in the left mid descending colon extending all the way to the rectum. There is no pneumatosis or free air.   4.  There is cholelithiasis without biliary dilatation.   5.  There has been interval removal of the drainage catheter anterior to the spleen with minimal hypodense area in the anterior spleen again noted. There is no new fluid collection.      IR-US GUIDED PIV   Final Result    Ultrasound-guided PERIPHERAL IV INSERTION performed by    qualified nursing staff as above.      EC-ECHOCARDIOGRAM COMPLETE W/O CONT   Final Result      DX-LUMBAR SPINE-2 OR 3 VIEWS   Final Result      Moderate compression deformity of T11 is new compared to 2018.      Mild wedge deformity of T12 is unchanged.      Degenerative changes including facet arthropathy.      Mild retrolisthesis of L5 on S1 and L3 on L4.              Assessment/Plan  Urinary tract infection- (present on admission)  Assessment & Plan  Completed 10-day course of Bactrim    Gram-negative bacteremia- (present on admission)  Assessment & Plan  Completed 10-day course of Bactrim    Major depressive disorder, recurrent, severe with psychotic features (HCC)- (present on admission)  Assessment & Plan  Geodon, Paxil, Klonopin  Psychiatry following     Colostomy present (HCC)- (present on admission)  Assessment & Plan  Exploratory laparotomy with colostomy takedown and closure  5/27/2021  Segmental colectomy, colostomy creation, mobilization of splenic flexure, wound VAC placement, irrigation and debridement of flank wound on 11/10/2020.  Pain control  Wound care        Suicidal ideation- (present on admission)  Assessment & Plan  Legal hold discontinued 6/4/2021    Normocytic anemia- (present on admission)  Assessment & Plan  Follow cbc    Leukocytosis- (present on admission)  Assessment & Plan  Follow cbc    Obstructive uropathy- (present on admission)  Assessment & Plan  Bladder scan and straight cath prn    Permanent atrial fibrillation (HCC)- (present on admission)  Assessment & Plan  Metoprolol, Eliquis    Chronic heart failure with preserved ejection fraction (HCC)- (present on admission)  Assessment & Plan  Metoprolol, Lasix      Type 2 diabetes mellitus with hyperglycemia, with long-term current use of insulin (HCC)- (present on admission)  Assessment & Plan  Follow bmp    Hypothyroidism- (present on admission)  Assessment & Plan  Increased Levothyroxine to 125 mcg  Check TSH on 6/29/2021    SRUTHI (acute kidney injury) (HCC)- (present on admission)  Assessment & Plan  Follow bmp    Adrenal insufficiency (HCC)- (present on admission)  Assessment & Plan  Cortef 5 mg BID  Plan to decrease to daily on 7/4/2021    Debility- (present on admission)  Assessment & Plan  PT and OT;    Orthostatic hypotension- (present on admission)  Assessment & Plan  off Florinef.    Hypomagnesemia- (present on admission)  Assessment & Plan  Follow level    Hypokalemia- (present on admission)  Assessment & Plan  Increase K. Dur  Follow bmp    Chronic venous insufficiency of lower extremity- (present on admission)  Assessment & Plan  compression stockings.    Mixed hyperlipidemia- (present on admission)  Assessment & Plan  Atorvastatin.     Lower limb amputation, great toe (HCC)- (present on admission)  Assessment & Plan  Monitor  No signs of infection     Class 2 severe obesity with serious comorbidity and  body mass index (BMI) of 35.0 to 35.9 in adult (HCC)- (present on admission)  Assessment & Plan  Body mass index is 35.19 kg/m².       VTE prophylaxis: Lovenox

## 2021-06-24 NOTE — PROGRESS NOTES
Assumed care of patient at approx 0700hrs; bedside report from ELISA Haddad. Updated on POC. Patient currently A & O x 4; on RA;pt reports pain 7/10; will medicate per MAR. Assessment completed. Call light within reach. Whiteboard updated. Fall precautions in place. Bed locked and in lowest position. All questions answered. No other needs indicated at this time.

## 2021-06-24 NOTE — CARE PLAN
The patient is: Stable - Low risk of patient condition declining or worsening    Progress made toward(s) clinical / shift goals:  Patient is hemodynamically stable.  Patient is not expressing suicidal ideation at this time.    Patient is not progressing towards the following goals:  Patient's mobility has progressively worsened since admission.  Patient is incontinent of bowel and bladder since ostomy reversal surgery.            Problem: Bowel Elimination  Goal: Establish and maintain regular bowel function  Outcome: Not Progressing     Problem: Mobility  Goal: Patient's capacity to carry out activities will improve  Outcome: Not Progressing           Problem: Knowledge Deficit - Standard  Goal: Patient and family/care givers will demonstrate understanding of plan of care, disease process/condition, diagnostic tests and medications  Outcome: Progressing     Problem: Depression  Goal: Patient and family/caregiver will verbalize accurate information about at least two of the possible causes of depression, three-four of the signs and symptoms of depression  Outcome: Progressing     Problem: Provide Safe Environment  Goal: Suicide environmental safety, protocols, policies, and practices will be implemented  Outcome: Progressing     Problem: Psychosocial  Goal: Patient's ability to identify and develop effective coping behaviors will improve  Outcome: Progressing  Goal: Patient's ability to identify and utilize available support systems will improve  Outcome: Progressing     Problem: Hemodynamics  Goal: Patient's hemodynamics, fluid balance and neurologic status will be stable or improve  Outcome: Progressing     Problem: Fluid Volume  Goal: Fluid volume balance will be maintained  Outcome: Progressing     Problem: Urinary - Renal Perfusion  Goal: Ability to achieve and maintain adequate renal perfusion and functioning will improve  Outcome: Progressing     Problem: Respiratory  Goal: Patient will achieve/maintain  optimum respiratory ventilation and gas exchange  Outcome: Progressing     Problem: Physical Regulation  Goal: Diagnostic test results will improve  Outcome: Progressing  Goal: Signs and symptoms of infection will decrease  Outcome: Progressing     Problem: Psychosocial  Goal: Patient's ability to re-evaluate and adapt role responsibilities will improve  Outcome: Progressing     Problem: Fall Risk  Goal: Patient will remain free from falls  Outcome: Progressing     Problem: Pain - Standard  Goal: Alleviation of pain or a reduction in pain to the patient’s comfort goal  Outcome: Progressing

## 2021-06-24 NOTE — PROGRESS NOTES
Bedside shift report received from ELISA Cosme.  Safety check complete.  All patient needs met at this time.  Will continue to monitor.

## 2021-06-24 NOTE — PROGRESS NOTES
[facility] Medicine Daily Progress Note    Date of Service  6/24/2021    Chief Complaint  56 y.o. male admitted 2/9/2021 with Syncope.    Hospital Course  Admitted with syncope, he was noted to be bradycardic and hypotensive.  He has known history of permanent atrial fibrillation and he was on diltiazem, digoxin and metoprolol.  These medications were held.  Later on he was restarted on low-dose Toprol and Digoxin.  He was also started on anticoagulation with Eliquis.  He also had a possible history of adrenal insufficiency, he was already on Florinef and midodrine.  Midodrine was discontinued and he was started on cortisol. Further work-up showed that he had gram-negative bacteremia as well as urinary tract infection and acute kidney injury.  He was initially placed on IV Zosyn, later changed to oral Bactrim to finish the course.  He was carefully hydrated with IV fluids.  On admission he also expressed suicidal ideation.  Psychiatry was consulted the case, he was placed on a legal hold.  He was noted to have severe depression.  He has been placed on medication the form of Geodon, Paxil, Klonopin.  He has a colostomy in place, which the patient believes contributes to his severe depression because of ostomy care.  He had history of bowel perforation and splenic fluid collection requiring Segmental colectomy, colostomy creation, mobilization of splenic flexure, wound VAC placement, irrigation and debridement of flank wound on 11/10/2020.  Surgery was consulted on the case, and he underwent exploratory laparotomy and colostomy takedown and closure on 5/27/2021.  Wound VAC was later placed.  Psychiatry continue to follow the patient, his legal hold was discontinued on 6/4/2021    Interval Problem Update  Colostomy - pain controlled  Afib - rate 60-80  SRUTHI - crea 1.6  CHF - probnp 3200  Low potassium and magnesium    Consultants/Specialty  Cardiology  Psychiatry  Surgery    Code Status  Full Code    Disposition  SNF      Review of Systems  Review of Systems   Constitutional: Positive for malaise/fatigue. Negative for chills, diaphoresis and fever.   HENT: Negative for congestion, hearing loss and sore throat.    Eyes: Negative for blurred vision.   Respiratory: Negative for cough, shortness of breath and wheezing.    Cardiovascular: Negative for chest pain, palpitations and leg swelling.   Gastrointestinal: Negative for abdominal pain, diarrhea, heartburn, nausea and vomiting.   Genitourinary: Negative for dysuria, flank pain and hematuria.   Musculoskeletal: Negative for back pain, joint pain, myalgias and neck pain.   Skin: Negative for rash.   Neurological: Positive for weakness. Negative for dizziness, sensory change, speech change, focal weakness and headaches.   Psychiatric/Behavioral: Negative for depression, hallucinations and suicidal ideas. The patient is not nervous/anxious.       Physical Exam  Temp:  [36.1 °C (96.9 °F)-36.4 °C (97.5 °F)] 36.2 °C (97.2 °F)  Pulse:  [56-78] 78  Resp:  [16-18] 18  BP: ()/(56-76) 124/76  SpO2:  [94 %-97 %] 97 %    Physical Exam  Vitals and nursing note reviewed.   Constitutional:       Appearance: He is obese.   HENT:      Head: Normocephalic and atraumatic.      Nose: No congestion.      Mouth/Throat:      Mouth: Mucous membranes are moist.   Eyes:      Extraocular Movements: Extraocular movements intact.      Conjunctiva/sclera: Conjunctivae normal.      Pupils: Pupils are equal, round, and reactive to light.   Cardiovascular:      Rate and Rhythm: Normal rate. Rhythm irregularly irregular.   Pulmonary:      Effort: Pulmonary effort is normal.      Breath sounds: Normal breath sounds.   Abdominal:      General: There is no distension.      Tenderness: There is abdominal tenderness. There is no guarding or rebound.      Comments: Wound vac   Musculoskeletal:      Cervical back: No muscular tenderness.      Right lower leg: Edema present.      Left lower leg: Edema present.    Skin:     General: Skin is warm and dry.   Neurological:      General: No focal deficit present.      Mental Status: He is alert and oriented to person, place, and time.      Cranial Nerves: No cranial nerve deficit.   Psychiatric:         Mood and Affect: Mood is not anxious or depressed.         Thought Content: Thought content is not paranoid or delusional. Thought content does not include homicidal or suicidal ideation. Thought content does not include homicidal or suicidal plan.       Fluids    Intake/Output Summary (Last 24 hours) at 6/24/2021 1208  Last data filed at 6/24/2021 0502  Gross per 24 hour   Intake 240 ml   Output 200 ml   Net 40 ml       Laboratory  Recent Labs     06/23/21  0320 06/24/21  0320   WBC 9.9 9.3   RBC 3.56* 3.48*   HEMOGLOBIN 10.0* 10.0*   HEMATOCRIT 32.1* 31.3*   MCV 90.2 89.9   MCH 28.1 28.7   MCHC 31.2* 31.9*   RDW 52.2* 52.4*   PLATELETCT 225 209   MPV 11.3 11.4     Recent Labs     06/23/21  0320 06/24/21  0320   SODIUM 136 138   POTASSIUM 2.9* 3.1*   CHLORIDE 103 105   CO2 24 23   GLUCOSE 90 108*   BUN 28* 25*   CREATININE 1.56* 1.65*   CALCIUM 8.4* 8.1*                   Imaging  US-RENAL   Final Result      1.  Low-level echogenic debris in the urinary bladder is noted which could indicate UTI or blood within the bladder.      2.  No hydronephrosis.      IR-MIDLINE CATHETER INSERTION WO GUIDANCE > AGE 3   Final Result                  Ultrasound-guided midline placement performed by qualified nursing staff    as above.          EC-ECHOCARDIOGRAM COMPLETE W/ CONT   Final Result      DX-CHEST-PORTABLE (1 VIEW)   Final Result      1.  Minor asymmetric interstitial opacity in the right lower lobe which could represent minor interstitial edema, pneumonia, or fibrotic change.      2.  No left lung consolidation.      3.  No evidence of congestive failure.      MP-[id number]-0 VIEW   Final Result         No specific finding to suggest small bowel obstruction.      [id number] VIEW    Final Result         No significant interval change.      DX-CHEST-LIMITED (1 VIEW)   Final Result         Diffuse interstitial prominence could relate to mild pulmonary edema or atypical infection      [id number] VIEW   Final Result      Increased colonic gas without definite bowel obstruction.      US-RENAL   Final Result      No evidence of hydronephrosis.      Lobulated kidneys bilaterally.      CT-ABDOMEN-PELVIS WITH   Final Result      1.  There is no evidence of small bowel obstruction.   2.  There is a left lower quadrant ostomy.   3.  There is fluid distention of the colon distal to the surgical material in the left mid descending colon extending all the way to the rectum. There is no pneumatosis or free air.   4.  There is cholelithiasis without biliary dilatation.   5.  There has been interval removal of the drainage catheter anterior to the spleen with minimal hypodense area in the anterior spleen again noted. There is no new fluid collection.      IR-US GUIDED PIV   Final Result    Ultrasound-guided PERIPHERAL IV INSERTION performed by    qualified nursing staff as above.      EC-ECHOCARDIOGRAM COMPLETE W/O CONT   Final Result      DX-LUMBAR SPINE-2 OR 3 VIEWS   Final Result      Moderate compression deformity of T11 is new compared to 2018.      Mild wedge deformity of T12 is unchanged.      Degenerative changes including facet arthropathy.      Mild retrolisthesis of L5 on S1 and L3 on L4.              Assessment/Plan  Urinary tract infection- (present on admission)  Assessment & Plan  Completed 10-day course of Bactrim    Gram-negative bacteremia- (present on admission)  Assessment & Plan  Completed 10-day course of Bactrim    Major depressive disorder, recurrent, severe with psychotic features (HCC)- (present on admission)  Assessment & Plan  Geodon, Paxil, Klonopin    Colostomy present (HCC)- (present on admission)  Assessment & Plan  Exploratory laparotomy with colostomy takedown and closure  5/27/2021  Segmental colectomy, colostomy creation, mobilization of splenic flexure, wound VAC placement, irrigation and debridement of flank wound on 11/10/2020.  Pain control  Wound care        Suicidal ideation- (present on admission)  Assessment & Plan  Legal hold discontinued 6/4/2021    Normocytic anemia- (present on admission)  Assessment & Plan  Follow cbc    Leukocytosis- (present on admission)  Assessment & Plan  Follow cbc    Obstructive uropathy- (present on admission)  Assessment & Plan  Bladder scan and straight cath prn    Permanent atrial fibrillation (HCC)- (present on admission)  Assessment & Plan  Metoprolol, Eliquis    Chronic heart failure with preserved ejection fraction (HCC)- (present on admission)  Assessment & Plan  Metoprolol, Lasix  Fluid restriction of 1.5 L per day      Type 2 diabetes mellitus with hyperglycemia, with long-term current use of insulin (HCC)- (present on admission)  Assessment & Plan  Follow bmp    Hypothyroidism- (present on admission)  Assessment & Plan  Increased Levothyroxine to 125 mcg  Check TSH on 6/29/2021    SRUTHI (acute kidney injury) (HCC)- (present on admission)  Assessment & Plan  Follow bmp    Adrenal insufficiency (HCC)- (present on admission)  Assessment & Plan  Cortef 5 mg BID  Plan to decrease to daily on 7/4/2021    Debility- (present on admission)  Assessment & Plan  PT and OT;    Orthostatic hypotension- (present on admission)  Assessment & Plan  off Florinef.    Hypomagnesemia- (present on admission)  Assessment & Plan  IV Mg 2 g  Follow level    Hypokalemia- (present on admission)  Assessment & Plan  Increase Kdur to 40 meqs BID  Follow bmp    Chronic venous insufficiency of lower extremity- (present on admission)  Assessment & Plan  compression stockings.    Mixed hyperlipidemia- (present on admission)  Assessment & Plan  Atorvastatin.     Lower limb amputation, great toe (HCC)- (present on admission)  Assessment & Plan  Monitor  No signs of  infection     Class 2 severe obesity with serious comorbidity and body mass index (BMI) of 35.0 to 35.9 in adult (HCC)- (present on admission)  Assessment & Plan  Body mass index is 35.19 kg/m².       VTE prophylaxis: Lovenox

## 2021-06-25 LAB
ANION GAP SERPL CALC-SCNC: 9 MMOL/L (ref 7–16)
BASOPHILS # BLD AUTO: 0.6 % (ref 0–1.8)
BASOPHILS # BLD: 0.06 K/UL (ref 0–0.12)
BUN SERPL-MCNC: 24 MG/DL (ref 8–22)
CALCIUM SERPL-MCNC: 8.5 MG/DL (ref 8.5–10.5)
CHLORIDE SERPL-SCNC: 104 MMOL/L (ref 96–112)
CO2 SERPL-SCNC: 25 MMOL/L (ref 20–33)
CREAT SERPL-MCNC: 1.65 MG/DL (ref 0.5–1.4)
EOSINOPHIL # BLD AUTO: 0.49 K/UL (ref 0–0.51)
EOSINOPHIL NFR BLD: 4.5 % (ref 0–6.9)
ERYTHROCYTE [DISTWIDTH] IN BLOOD BY AUTOMATED COUNT: 52.8 FL (ref 35.9–50)
GLUCOSE SERPL-MCNC: 104 MG/DL (ref 65–99)
HCT VFR BLD AUTO: 32.5 % (ref 42–52)
HGB BLD-MCNC: 10.1 G/DL (ref 14–18)
IMM GRANULOCYTES # BLD AUTO: 0.05 K/UL (ref 0–0.11)
IMM GRANULOCYTES NFR BLD AUTO: 0.5 % (ref 0–0.9)
LYMPHOCYTES # BLD AUTO: 2.4 K/UL (ref 1–4.8)
LYMPHOCYTES NFR BLD: 22.1 % (ref 22–41)
MAGNESIUM SERPL-MCNC: 1.7 MG/DL (ref 1.5–2.5)
MCH RBC QN AUTO: 28.5 PG (ref 27–33)
MCHC RBC AUTO-ENTMCNC: 31.1 G/DL (ref 33.7–35.3)
MCV RBC AUTO: 91.8 FL (ref 81.4–97.8)
MONOCYTES # BLD AUTO: 0.86 K/UL (ref 0–0.85)
MONOCYTES NFR BLD AUTO: 7.9 % (ref 0–13.4)
NEUTROPHILS # BLD AUTO: 6.98 K/UL (ref 1.82–7.42)
NEUTROPHILS NFR BLD: 64.4 % (ref 44–72)
NRBC # BLD AUTO: 0 K/UL
NRBC BLD-RTO: 0 /100 WBC
PLATELET # BLD AUTO: 221 K/UL (ref 164–446)
PMV BLD AUTO: 12.2 FL (ref 9–12.9)
POTASSIUM SERPL-SCNC: 3.4 MMOL/L (ref 3.6–5.5)
RBC # BLD AUTO: 3.54 M/UL (ref 4.7–6.1)
SODIUM SERPL-SCNC: 138 MMOL/L (ref 135–145)
WBC # BLD AUTO: 10.8 K/UL (ref 4.8–10.8)

## 2021-06-25 PROCEDURE — 700102 HCHG RX REV CODE 250 W/ 637 OVERRIDE(OP): Performed by: FAMILY MEDICINE

## 2021-06-25 PROCEDURE — A9270 NON-COVERED ITEM OR SERVICE: HCPCS | Performed by: INTERNAL MEDICINE

## 2021-06-25 PROCEDURE — 80048 BASIC METABOLIC PNL TOTAL CA: CPT

## 2021-06-25 PROCEDURE — 700102 HCHG RX REV CODE 250 W/ 637 OVERRIDE(OP): Performed by: INTERNAL MEDICINE

## 2021-06-25 PROCEDURE — 36415 COLL VENOUS BLD VENIPUNCTURE: CPT

## 2021-06-25 PROCEDURE — A9270 NON-COVERED ITEM OR SERVICE: HCPCS | Performed by: FAMILY MEDICINE

## 2021-06-25 PROCEDURE — 97608 NEG PRS WND THER NDME>50SQCM: CPT

## 2021-06-25 PROCEDURE — 302098 PASTE RING (FLAT): Performed by: FAMILY MEDICINE

## 2021-06-25 PROCEDURE — 700111 HCHG RX REV CODE 636 W/ 250 OVERRIDE (IP): Performed by: INTERNAL MEDICINE

## 2021-06-25 PROCEDURE — 770001 HCHG ROOM/CARE - MED/SURG/GYN PRIV*

## 2021-06-25 PROCEDURE — 302146: Performed by: FAMILY MEDICINE

## 2021-06-25 PROCEDURE — 83735 ASSAY OF MAGNESIUM: CPT

## 2021-06-25 PROCEDURE — 99232 SBSQ HOSP IP/OBS MODERATE 35: CPT | Performed by: FAMILY MEDICINE

## 2021-06-25 PROCEDURE — 700111 HCHG RX REV CODE 636 W/ 250 OVERRIDE (IP): Performed by: FAMILY MEDICINE

## 2021-06-25 PROCEDURE — 85025 COMPLETE CBC W/AUTO DIFF WBC: CPT

## 2021-06-25 RX ORDER — MAGNESIUM SULFATE HEPTAHYDRATE 40 MG/ML
2 INJECTION, SOLUTION INTRAVENOUS ONCE
Status: COMPLETED | OUTPATIENT
Start: 2021-06-25 | End: 2021-06-25

## 2021-06-25 RX ADMIN — AMIODARONE HYDROCHLORIDE 200 MG: 200 TABLET ORAL at 04:31

## 2021-06-25 RX ADMIN — LEVOTHYROXINE SODIUM 125 MCG: 0.12 TABLET ORAL at 04:31

## 2021-06-25 RX ADMIN — POTASSIUM CHLORIDE 40 MEQ: 1500 TABLET, EXTENDED RELEASE ORAL at 04:31

## 2021-06-25 RX ADMIN — OXYCODONE 10 MG: 5 TABLET ORAL at 08:16

## 2021-06-25 RX ADMIN — CLONAZEPAM 0.5 MG: 0.5 TABLET ORAL at 08:13

## 2021-06-25 RX ADMIN — OXYCODONE 10 MG: 5 TABLET ORAL at 22:12

## 2021-06-25 RX ADMIN — HYDROCORTISONE 5 MG: 5 TABLET ORAL at 04:31

## 2021-06-25 RX ADMIN — OXYCODONE 10 MG: 5 TABLET ORAL at 15:52

## 2021-06-25 RX ADMIN — HYDROCORTISONE 5 MG: 5 TABLET ORAL at 15:43

## 2021-06-25 RX ADMIN — ATORVASTATIN CALCIUM 40 MG: 40 TABLET, FILM COATED ORAL at 17:02

## 2021-06-25 RX ADMIN — HYDROMORPHONE HYDROCHLORIDE 0.5 MG: 1 INJECTION, SOLUTION INTRAMUSCULAR; INTRAVENOUS; SUBCUTANEOUS at 12:59

## 2021-06-25 RX ADMIN — TAMSULOSIN HYDROCHLORIDE 0.4 MG: 0.4 CAPSULE ORAL at 08:13

## 2021-06-25 RX ADMIN — ZIPRASIDONE HYDROCHLORIDE 60 MG: 60 CAPSULE ORAL at 08:13

## 2021-06-25 RX ADMIN — CLONAZEPAM 1 MG: 1 TABLET ORAL at 17:03

## 2021-06-25 RX ADMIN — ZIPRASIDONE HYDROCHLORIDE 60 MG: 60 CAPSULE ORAL at 22:54

## 2021-06-25 RX ADMIN — METOPROLOL TARTRATE 25 MG: 25 TABLET, FILM COATED ORAL at 17:02

## 2021-06-25 RX ADMIN — APIXABAN 5 MG: 5 TABLET, FILM COATED ORAL at 17:02

## 2021-06-25 RX ADMIN — MAGNESIUM SULFATE 2 G: 2 INJECTION INTRAVENOUS at 08:13

## 2021-06-25 RX ADMIN — PAROXETINE HYDROCHLORIDE 20 MG: 20 TABLET, FILM COATED ORAL at 08:13

## 2021-06-25 RX ADMIN — AMIODARONE HYDROCHLORIDE 200 MG: 200 TABLET ORAL at 17:02

## 2021-06-25 RX ADMIN — APIXABAN 5 MG: 5 TABLET, FILM COATED ORAL at 04:31

## 2021-06-25 RX ADMIN — OMEPRAZOLE 40 MG: 20 CAPSULE, DELAYED RELEASE ORAL at 04:31

## 2021-06-25 RX ADMIN — Medication 1000 UNITS: at 04:31

## 2021-06-25 RX ADMIN — METOPROLOL TARTRATE 25 MG: 25 TABLET, FILM COATED ORAL at 04:31

## 2021-06-25 RX ADMIN — OMEPRAZOLE 40 MG: 20 CAPSULE, DELAYED RELEASE ORAL at 17:02

## 2021-06-25 RX ADMIN — POTASSIUM CHLORIDE 40 MEQ: 1500 TABLET, EXTENDED RELEASE ORAL at 17:02

## 2021-06-25 ASSESSMENT — ENCOUNTER SYMPTOMS
FLANK PAIN: 0
DIZZINESS: 0
BACK PAIN: 0
FEVER: 0
HEADACHES: 0
WHEEZING: 0
DEPRESSION: 0
SHORTNESS OF BREATH: 0
SENSORY CHANGE: 0
CHILLS: 0
NERVOUS/ANXIOUS: 0
COUGH: 0
MYALGIAS: 0
DIARRHEA: 0
NAUSEA: 0
HALLUCINATIONS: 0
DIAPHORESIS: 0
VOMITING: 0
HEARTBURN: 0
WEAKNESS: 1
NECK PAIN: 0
SORE THROAT: 0
SPEECH CHANGE: 0
BLURRED VISION: 0
PALPITATIONS: 0
ABDOMINAL PAIN: 1
FOCAL WEAKNESS: 0

## 2021-06-25 ASSESSMENT — PAIN DESCRIPTION - PAIN TYPE
TYPE: ACUTE PAIN
TYPE: ACUTE PAIN;CHRONIC PAIN

## 2021-06-25 NOTE — THERAPY
"Occupational Therapy  Daily Treatment     Patient Name: Sebastián Castro  Age:  56 y.o., Sex:  male  Medical Record #: [id number]  Today's Date: 6/24/2021     Precautions  Precautions: (P) Fall Risk  Comments: (P) abd wound vac    Assessment    Pt seen for OT tx session. Pt continues to make slow progress towards acute OT goals, however remains motivated to participate. Pt required mod A for bed mobility, performed seated grooming w/ SPV, toileting w/ total A, and stood w/ max A of 2 people. Pt performed lateral weight shifting at EOB to increase standing tolerance in preparation for standing ADLs. Will continue to follow while in house as pt is below functional baseline.     Plan    Continue current treatment plan.    DC Equipment Recommendations: (P) Unable to determine at this time  Discharge Recommendations: (P) Recommend post-acute placement for additional occupational therapy services prior to discharge home    Subjective    \"I was starting to go to a bad place in my head, I am glad you are here\"     Objective       06/24/21 1615   Precautions   Precautions Fall Risk   Comments abd wound vac   Vitals   O2 (LPM) 0   O2 Delivery Device None - Room Air   Pain 0 - 10 Group   Therapist Pain Assessment Post Activity Pain Same as Prior to Activity;Nurse Notified  (no c/o pain)   Cognition    Comments pleasent, cooperative, and motivated, reported feeling sad about not hitting goals in a timely matter   Balance   Sitting Balance (Static) Fair   Sitting Balance (Dynamic) Fair -   Standing Balance (Static) Poor +   Standing Balance (Dynamic) Poor   Weight Shift Sitting Fair   Weight Shift Standing Poor   Skilled Intervention Tactile Cuing;Verbal Cuing;Postural Facilitation   Comments w/ 4WW   Bed Mobility    Supine to Sit Moderate Assist   Sit to Supine Moderate Assist   Scooting Moderate Assist   Rolling Minimum Assist to Lt.;Minimal Assist to Rt.   Skilled Intervention Tactile Cuing;Verbal Cuing;Facilitation   Activities of " Daily Living   Grooming Supervision;Seated   Upper Body Dressing Minimal Assist   Toileting Total Assist  (incont bowel)   Skilled Intervention Tactile Cuing;Verbal Cuing   How much help from another person does the patient currently need...   Putting on and taking off regular lower body clothing? 2   Bathing (including washing, rinsing, and drying)? 2   Toileting, which includes using a toilet, bedpan, or urinal? 2   Putting on and taking off regular upper body clothing? 3   Taking care of personal grooming such as brushing teeth? 3   Eating meals? 4   6 Clicks Daily Activity Score 16   Functional Mobility   Sit to Stand Maximal Assist   Mobility 3 STS w/ 2 person assist, lateral weight shifting   Skilled Intervention Tactile Cuing;Verbal Cuing   Activity Tolerance   Sitting Edge of Bed 20   Standing 3-4 min total   Patient / Family Goals   Patient / Family Goal #1 To feel better   Goal #1 Outcome Goal not met   Short Term Goals   Short Term Goal # 1 Pt will perform LB dressing w/ min A   Goal Outcome # 1 Progressing slower than expected   Short Term Goal # 2 Pt will perform functional t/f's with supervision   Goal Outcome # 2 Progressing slower than expected   Short Term Goal # 3 Pt will perform h/g standing sink side with supervision   Goal Outcome # 3 Progressing slower than expected   Anticipated Discharge Equipment and Recommendations   DC Equipment Recommendations Unable to determine at this time   Discharge Recommendations Recommend post-acute placement for additional occupational therapy services prior to discharge home   Interdisciplinary Plan of Care Collaboration   IDT Collaboration with  Nursing   Patient Position at End of Therapy In Bed;Call Light within Reach;Tray Table within Reach;Phone within Reach;Bed Alarm On   Collaboration Comments report given   Session Information   Date / Session Number  6/24, 7 (2/2, 6/27)

## 2021-06-25 NOTE — CARE PLAN
The patient is Stable - Low risk of patient condition declining or worsening    Shift Goals  Clinical Goals: pain control, encourage patient to attempt to site edge of bed at least once today   Patient Goals: pain control   Family Goals: n/a    Progress made toward(s) clinical / shift goals:  patient reports good pain control with current regimen.   Problem: Knowledge Deficit - Standard  Goal: Patient and family/care givers will demonstrate understanding of plan of care, disease process/condition, diagnostic tests and medications  Outcome: Progressing     Problem: Psychosocial  Goal: Patient's ability to identify and develop effective coping behaviors will improve  Outcome: Progressing       Patient is not progressing towards the following goals:

## 2021-06-25 NOTE — WOUND TEAM
[facility]  Inpatient Services  Wound and Skin Care Progress Note    Admission Date: 2/9/2021     Last order of IP CONSULT TO WOUND CARE was found on 3/18/2021 from Hospital Encounter on 2/9/2021     HPI, PMH, SH: Reviewed    Past Surgical History:   Procedure Laterality Date   • PB EXPLORATORY OF ABDOMEN  5/27/2021    Procedure: LAPAROTOMY, EXPLORATORY;  Surgeon: Kin Desouza D.O.;  Location: [facility];  Service: General   • COLOSTOMY TAKEDOWN  5/27/2021    Procedure: COLOSTOMY TAKEDOWN AND CLOSURE;  Surgeon: Kin Desouza D.O.;  Location: SURGERY [facility];  Service: General   • COLECTOMY N/A 11/10/2020    Procedure: COLECTOMY-SEGMENTAL, COLOSTOMY, MOBILIZATION OF SPLENIC FLEXURE, WOUND VAC PLACEMENT, IRRIGATION AND DEBRIDMENT FLANK WOUND;  Surgeon: Kin Desouza D.O.;  Location: [facility];  Service: General   • ZZZ CARDIAC CATH  07/21/2018    EF 45%, normal coronaries.   • IRRIGATION & DEBRIDEMENT ORTHO Right 2/19/2018    Procedure: IRRIGATION & DEBRIDEMENT ORTHO-FOOT;  Surgeon: Kirby Lopez M.D.;  Location: [facility];  Service: Orthopedics   • TOE AMPUTATION Right 1/17/2018    Procedure: TOE AMPUTATION-1ST RAY;  Surgeon: Doug Delong M.D.;  Location: [facility];  Service: Orthopedics   • TOE AMPUTATION Right 11/10/2017    Procedure: TOE AMPUTATION;  Surgeon: Osorio Leo M.D.;  Location: [facility];  Service: Orthopedics     Social History     Tobacco Use   • Smoking status: Never Smoker   • Smokeless tobacco: Never Used   Substance Use Topics   • Alcohol use: No     Chief Complaint   Patient presents with   • Syncope     X1 this morning when standing up to go to bathroom     Diagnosis: Syncope and collapse [R55]  Suicidal ideation [R45.851]  Sepsis due to Klebsiella pneumoniae (HCC) [A41.4]    Unit where seen by Wound Team: T418/00     WOUND CONSULT/FOLLOW UP RELATED TO: abdominal wound    WOUND HISTORY:  Pt is an older  gentleman well known to [facility] wound team 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 Pt currently admitted since 2/9/21 related to SI concerns due to body image issues related to colostomy.      *Pt underwent surgical reversal of colostomy with Dr. Desouza on 5/20/21. MLI was re-opened at that time and was then closed with interrupted staples and packing. Unfortunately MLI continued to Dehisce and therefore wound team was consulted to further manage MLI with dakins packing.     6/3 Wound vac placed    WOUND ASSESSMENT/LDA      Negative Pressure Wound Therapy 06/03/21 Abdomen (Active)   Vacuum Serial Number [id number]    NPWT Pump Mode / Pressure Setting Continuous;Ulta;125 mmHg    Dressing Type Medium;Black Foam (Regular)    Number of Foam Pieces Used 3    Canister Changed No    Output (mL) 0 mL    NEXT Dressing Change/Treatment Date 06/28/21    WOUND NURSE ONLY - Time Spent with Patient (mins) 60          Wound 05/27/21 Full Thickness Wound Abdomen (Active)   Wound Image   06/25/21 1330   Site Assessment Pink;Red;Early/partial granulation    Periwound Assessment Intact    Margins Attached edges    Closure Secondary intention    Drainage Amount Small    Drainage Description Serosanguineous    Treatments Cleansed;Site care    Wound Cleansing Approved Wound Cleanser    Periwound Protectant Paste Ring;Hydrofiber;Skin Protectant Wipes to Periwound;Stoma Powder    Dressing Cleansing/Solutions Not Applicable    Dressing Options Wound Vac    Dressing Changed Changed    Dressing Status Clean;Intact;Dry    Dressing Change/Treatment Frequency Monday, Wednesday, Friday, and As Needed    NEXT Dressing Change/Treatment Date 06/28/21    NEXT Weekly Photo (Inpatient Only) 07/02/21    Non-staged Wound Description Full thickness    Wound Length (cm) 17 cm    Wound Width (cm) 6.9 cm    Wound Depth (cm) 0.8 cm    Wound Surface Area (cm^2) 117.3 cm^2    Wound Volume (cm^3) 93.84 cm^3    Wound Healing % -15    Tunneling  (cm) 1 cm    Tunneling Clock Position of Wound 12    Tunneling - 2nd Location (cm) 0.7 cm    Tunneling Clock Position of Wound - 2nd Location 6    Undermining (cm) 1.2 cm    Undermining of Wound, 1st Location From 1 o'clock;To 2 o'clock    Shape Linear    Wound Odor None    Pulses N/A    Exposed Structures Sutures;Fascia    WOUND NURSE ONLY - Time Spent with Patient (mins) 60      Vascular:    SHAYAN:   No results found.    Lab Values:    Lab Results   Component Value Date/Time    WBC 10.8 2021 03:56 AM    RBC 3.54 (L) 2021 03:56 AM    HEMOGLOBIN 10.1 (L) 2021 03:56 AM    HEMATOCRIT 32.5 (L) 2021 03:56 AM    CREACTPROT 0.48 06/10/2021 04:05 AM    SEDRATEWES 13 10/23/2018 08:02 AM    HBA1C 5.2 2021 01:30 AM      Culture Results show:  No results found for this or any previous visit (from the past 720 hour(s)).    Pain Level/Medicated:  Tolerated well without pain medication.      INTERVENTIONS BY WOUND TEAM:  Chart and images reviewed. Discussed with bedside RN. All areas of concern (based on picture review, LDA review and discussion with bedside RN) have been thoroughly assessed. Documentation of areas based on significant findings. This RN in to assess patient. Performed standard wound care which includes appropriate positioning, dressing removal and non-selective debridement. Pictures and measurements obtained weekly if/when required.              ABD:   Preparation for Dressing removal:  Removed without difficulty.    Cleansed with: Wound cleanser and gauze   Sharp debridement: 3 staples from distal end   Joya wound: Cleansed with Wound cleanser and gauze, No sting skin and ostomy powder and paste ring x 2, aquacel ag to distal and proximal staples.    Primary Dressin pieces of regular black foam to wound bed.     Secondary (Outer) Dressing:  Sealed with drape, button and TRAC pad applied.     Interdisciplinary consultation: Patient, Bedside RN, wound RN Meenu     EVALUATION /  "RATIONALE FOR TREATMENT:  Most Recent Date:  06/25/21 IP con: wound continues to have granulation tissue.  Left abd with some protuding, Dr. natarajan updated and assessed.       6/23/21: wound continues to progress, transitioned to regular vac and continued to pull edges together.   6/21/21: Edges pulled together with jones by Dr. Rooney during change on Friday. Discussed with Lyly today. MD unable to be bedside today. Will continue with veraflo hopefully will be able to transition to regular vac at next visit to further assist with edges being pulled together. Meet with Dr. Rooney on Wednesday at 8am to assess wound.  6/18/21: continuing to decrease in slough in wound bed. One suture towards proximal end visible, per MD rooney, leave in place but monitor. Staples added to superior and inferior ends to assist with closure by MD. Continuing to pull edges together, MD will reassess on Monday with wound team to monitor for improvement and decide if surgical I&D is needed or if appropriate to switch to regular NPWT dressing instead of VF.   6/16/21: Wound with decreased slough. Wound appears to be \"flattening out.\" Veraflo is assisting with cleansing wound but is making pulling edges together difficult. Discussed with Dr. Rooney. Will plan to Meet with Dr. Rooney on Friday at 8am to assess wound, Dr. Rooney to pull superior and inferior borders together and staple to assist with closure.  6/14/21: Wound continues to granulate. Small amount of slough over fascial line. Discussed with Dr. Rooney. Plan to continue with veraflo, settings decreased. Will evaluate the wound with Dr. Rooney on Wednesday at 9am. Per MD may consider OR washout with staples to distal proximal aspect to assist in wound closure vs. Adding staples at bedside.   06/11/21:  Wound looks good, minimal slough mainly to fascial line sutures.  VF grey foam layer to assist in debridement of slough.  Dr. Rooney to re-assess Monday and do " surgical debridement if needed next week.   06/09/21:  Wound continues to progress, pull edges together to lessen width of wound. Dr. Desouza would like to see wound on Friday 6/7: Pt's wound appears to be improving per measurements and pictures. There is granulation tissue present.   06/5/21: Wound bed improving with increased granulation tissue.  Wound edges approximated prior to drape application.  NPWT continued.    06/03/21:  Fascial sutures intact, wound VAC placed with DR. Desouza.    6/1/21: Wound appears  than yesterdays pictures. Discussed with Dr. Desouza, plan to continue dakins TID for 48hrs, CT scan ordered today. Will re-evaluate on Thursday for potential vac placement.   5/31/21: Wound initially with interrupted staples and packing now with dehiscence. Per Dr. Baeza request wound team initiated dakins packing. Pt may benefit from NPWT to assist with closure.   5/8/21: wound nearly healed. Does not require advanced wound care. Wound team signing off and nursing to re consult if site worsens. Continue with dressing care per order.  4/21/21: abd wound improving. Nursing to continue dsg changes per order.  4/15: Right posterior thigh DTI now resolved. Pink and blanching. Abdominal wound to surface level continuing with dressing POC.  4/7/21: Thigh wound nearly healed and will likely be resolved at next assessment. Abdomen wound appears larger. Continued with Arti to move through inflammatory phase. Hydrofera blue for its bacteriostatic properties and to assist in drainage management.   03/31/21: thigh wound healing well now to a stage 2.  Continue current plan.  All interventions in place.   ABD more denuded and bloody.  Arti to assist in moving wound through inflammatory phase.   3/23/21: Right posterior thigh wound remains a DTI but appears to be evolving into a stage 2. Wound team was previously consulted regarding sacral wound which appeared to be a skin tear however skin now appears to  be intact however discolored. Does not appear to be pressure related as pt is on a waffle, self mobile and sacral mepilex is in use. Area is also not a normal pressure injury shape. Wound team will continue to follow.   3/18/21: Pt with linear DTIs to R posterior thigh from lying on top of frye cath tubing. Offloading precautions ordered. Wound team to continue following.     Goals: Steady decrease in wound area and depth weekly.    WOUND TEAM PLAN OF CARE ([X] for frequency of wound follow up,):   Nursing to follow orders written for wound care. Contact wound team if area fails to progress, deteriorates or with any questions/concerns  Dressing changes by wound team:                   Follow up 3 times weekly:                NPWT change 3 times weekly:   X  Follow up 1-2 times weekly:     Follow up Bi-Monthly:                   Follow up as needed:   Other (explain):     NURSING PLAN OF CARE ORDERS (X):  Dressing changes: See Dressing Care orders: X  Skin care: See Skin Care orders: x  RN Prevention Protocol: x  Rectal tube care: See Rectal Tube Care orders:   Other orders:     Anticipated discharge plans:   LTACH:        SNF/Rehab:                  Home Health Care:           Outpatient Wound Center:            Self/Family Care:        Other:   Group Home

## 2021-06-25 NOTE — PROGRESS NOTES
Assumed care of pt this am. Pt is A&O x4 and on room air. Reports pain, prn medication administered. Pt is Q2h turns, waffle overlay on bed. Midline abdomen wound vac in place, vac function assessed. Pt updated on current plan of care. Encouraged pt to voice needs and concerns. Hourly rounding in place.

## 2021-06-25 NOTE — PROGRESS NOTES
Pt is A&O 4  Pain medicated per MAR   - nausea  Tolerating a low fiber diet   Wound vac to the abdomen  Interdry to the abdomen with barrier paste  Redness to the buttock, barrier past applied   Generalized edema   Pure wick in place  BM 6/25  Up max assist  SCD's on  Bed alarm on, pt high fall risk per juli chávez  Reviewed plan of care with patient, bed in lowest position and locked, pt resting comfortably now, call light within reach, all needs met at this time. Interventions will be executed per plan of care

## 2021-06-25 NOTE — PROGRESS NOTES
[facility] Medicine Daily Progress Note    Date of Service  6/25/2021    Chief Complaint  56 y.o. male admitted 2/9/2021 with Syncope.    Hospital Course  Admitted with syncope, he was noted to be bradycardic and hypotensive.  He has known history of permanent atrial fibrillation and he was on diltiazem, digoxin and metoprolol.  These medications were held.  Later on he was restarted on low-dose Toprol and Digoxin.  He was also started on anticoagulation with Eliquis.  He also had a possible history of adrenal insufficiency, he was already on Florinef and midodrine.  Midodrine was discontinued and he was started on cortisol. Further work-up showed that he had gram-negative bacteremia as well as urinary tract infection and acute kidney injury.  He was initially placed on IV Zosyn, later changed to oral Bactrim to finish the course.  He was carefully hydrated with IV fluids.  On admission he also expressed suicidal ideation.  Psychiatry was consulted the case, he was placed on a legal hold.  He was noted to have severe depression.  He has been placed on medication the form of Geodon, Paxil, Klonopin.  He has a colostomy in place, which the patient believes contributes to his severe depression because of ostomy care.  He had history of bowel perforation and splenic fluid collection requiring Segmental colectomy, colostomy creation, mobilization of splenic flexure, wound VAC placement, irrigation and debridement of flank wound on 11/10/2020.  Surgery was consulted on the case, and he underwent exploratory laparotomy and colostomy takedown and closure on 5/27/2021.  Wound VAC was later placed.  Psychiatry continued to follow the patient, his legal hold was discontinued on 6/4/2021    Interval Problem Update  Colostomy - pain controlled  Afib - rate 60-80  SRUTHI - crea 1.6  Low potassium and magnesium    Consultants/Specialty  Cardiology  Psychiatry  Surgery    Code Status  Full Code    Disposition  SNF     Review of  Systems  Review of Systems   Constitutional: Negative for chills, diaphoresis, fever and malaise/fatigue.   HENT: Negative for congestion, hearing loss and sore throat.    Eyes: Negative for blurred vision.   Respiratory: Negative for cough, shortness of breath and wheezing.    Cardiovascular: Negative for chest pain, palpitations and leg swelling.   Gastrointestinal: Positive for abdominal pain. Negative for diarrhea, heartburn, nausea and vomiting.   Genitourinary: Negative for dysuria, flank pain and hematuria.   Musculoskeletal: Negative for back pain, joint pain, myalgias and neck pain.   Skin: Negative for rash.   Neurological: Positive for weakness. Negative for dizziness, sensory change, speech change, focal weakness and headaches.   Psychiatric/Behavioral: Negative for depression, hallucinations and suicidal ideas. The patient is not nervous/anxious.       Physical Exam  Temp:  [36.1 °C (97 °F)-36.3 °C (97.4 °F)] 36.1 °C (97 °F)  Pulse:  [64-79] 76  Resp:  [17-18] 18  BP: ()/(52-74) 114/74  SpO2:  [94 %-97 %] 96 %    Physical Exam  Vitals and nursing note reviewed.   Constitutional:       Appearance: He is obese.   HENT:      Head: Normocephalic and atraumatic.      Nose: No congestion.      Mouth/Throat:      Mouth: Mucous membranes are moist.   Eyes:      Extraocular Movements: Extraocular movements intact.      Conjunctiva/sclera: Conjunctivae normal.      Pupils: Pupils are equal, round, and reactive to light.   Cardiovascular:      Rate and Rhythm: Normal rate. Rhythm irregularly irregular.   Pulmonary:      Effort: Pulmonary effort is normal.      Breath sounds: Normal breath sounds.   Abdominal:      General: There is distension.      Tenderness: There is abdominal tenderness. There is no guarding or rebound.      Comments: Wound vac   Musculoskeletal:      Cervical back: No muscular tenderness.      Right lower leg: Edema present.      Left lower leg: Edema present.   Skin:     General: Skin  is warm and dry.   Neurological:      General: No focal deficit present.      Mental Status: He is alert and oriented to person, place, and time.      Cranial Nerves: No cranial nerve deficit.   Psychiatric:         Mood and Affect: Mood is not anxious or depressed.         Thought Content: Thought content is not paranoid or delusional. Thought content does not include homicidal or suicidal ideation. Thought content does not include homicidal or suicidal plan.       Fluids    Intake/Output Summary (Last 24 hours) at 6/25/2021 1017  Last data filed at 6/24/2021 1540  Gross per 24 hour   Intake 300 ml   Output 300 ml   Net 0 ml       Laboratory  Recent Labs     06/23/21  0320 06/24/21  0320 06/25/21  0356   WBC 9.9 9.3 10.8   RBC 3.56* 3.48* 3.54*   HEMOGLOBIN 10.0* 10.0* 10.1*   HEMATOCRIT 32.1* 31.3* 32.5*   MCV 90.2 89.9 91.8   MCH 28.1 28.7 28.5   MCHC 31.2* 31.9* 31.1*   RDW 52.2* 52.4* 52.8*   PLATELETCT 225 209 221   MPV 11.3 11.4 12.2     Recent Labs     06/23/21  0320 06/24/21  0320 06/25/21  0356   SODIUM 136 138 138   POTASSIUM 2.9* 3.1* 3.4*   CHLORIDE 103 105 104   CO2 24 23 25   GLUCOSE 90 108* 104*   BUN 28* 25* 24*   CREATININE 1.56* 1.65* 1.65*   CALCIUM 8.4* 8.1* 8.5                   Imaging  US-RENAL   Final Result      1.  Low-level echogenic debris in the urinary bladder is noted which could indicate UTI or blood within the bladder.      2.  No hydronephrosis.      IR-MIDLINE CATHETER INSERTION WO GUIDANCE > AGE 3   Final Result                  Ultrasound-guided midline placement performed by qualified nursing staff    as above.          EC-ECHOCARDIOGRAM COMPLETE W/ CONT   Final Result      DX-CHEST-PORTABLE (1 VIEW)   Final Result      1.  Minor asymmetric interstitial opacity in the right lower lobe which could represent minor interstitial edema, pneumonia, or fibrotic change.      2.  No left lung consolidation.      3.  No evidence of congestive failure.      BK-HFRAPND-6 VIEW   Final  Result         No specific finding to suggest small bowel obstruction.      SG-ZTZWFYV-1 VIEW   Final Result         No significant interval change.      DX-CHEST-LIMITED (1 VIEW)   Final Result         Diffuse interstitial prominence could relate to mild pulmonary edema or atypical infection      OH-YBHTRVI-0 VIEW   Final Result      Increased colonic gas without definite bowel obstruction.      US-RENAL   Final Result      No evidence of hydronephrosis.      Lobulated kidneys bilaterally.      CT-ABDOMEN-PELVIS WITH   Final Result      1.  There is no evidence of small bowel obstruction.   2.  There is a left lower quadrant ostomy.   3.  There is fluid distention of the colon distal to the surgical material in the left mid descending colon extending all the way to the rectum. There is no pneumatosis or free air.   4.  There is cholelithiasis without biliary dilatation.   5.  There has been interval removal of the drainage catheter anterior to the spleen with minimal hypodense area in the anterior spleen again noted. There is no new fluid collection.      IR-US GUIDED PIV   Final Result    Ultrasound-guided PERIPHERAL IV INSERTION performed by    qualified nursing staff as above.      EC-ECHOCARDIOGRAM COMPLETE W/O CONT   Final Result      DX-LUMBAR SPINE-2 OR 3 VIEWS   Final Result      Moderate compression deformity of T11 is new compared to 2018.      Mild wedge deformity of T12 is unchanged.      Degenerative changes including facet arthropathy.      Mild retrolisthesis of L5 on S1 and L3 on L4.              Assessment/Plan  Urinary tract infection- (present on admission)  Assessment & Plan  Completed 10-day course of Bactrim    Gram-negative bacteremia- (present on admission)  Assessment & Plan  Completed 10-day course of Bactrim    Major depressive disorder, recurrent, severe with psychotic features (HCC)- (present on admission)  Assessment & Plan  Geodon, Paxil, Klonopin    Colostomy present (HCC)- (present  on admission)  Assessment & Plan  Exploratory laparotomy with colostomy takedown and closure 5/27/2021  Segmental colectomy, colostomy creation, mobilization of splenic flexure, wound VAC placement, irrigation and debridement of flank wound on 11/10/2020.  Pain control  Wound care        Suicidal ideation- (present on admission)  Assessment & Plan  Legal hold discontinued 6/4/2021    Normocytic anemia- (present on admission)  Assessment & Plan  Follow cbc    Leukocytosis- (present on admission)  Assessment & Plan  Follow cbc    Obstructive uropathy- (present on admission)  Assessment & Plan  resolved    Permanent atrial fibrillation (HCC)- (present on admission)  Assessment & Plan  Metoprolol, Eliquis    Chronic heart failure with preserved ejection fraction (HCC)- (present on admission)  Assessment & Plan  Metoprolol, Lasix  Fluid restriction of 1.5 L per day      Type 2 diabetes mellitus with hyperglycemia, with long-term current use of insulin (HCC)- (present on admission)  Assessment & Plan  Follow bmp    Hypothyroidism- (present on admission)  Assessment & Plan  Increased Levothyroxine to 125 mcg  Check TSH on 6/29/2021    SRUTHI (acute kidney injury) (HCC)- (present on admission)  Assessment & Plan  Follow bmp    Adrenal insufficiency (HCC)- (present on admission)  Assessment & Plan  Cortef 5 mg BID  Plan to decrease to daily on 7/4/2021    Debility- (present on admission)  Assessment & Plan  PT and OT    Orthostatic hypotension- (present on admission)  Assessment & Plan  off Florinef.    Hypomagnesemia- (present on admission)  Assessment & Plan  IV Mg 2 g  Follow level    Hypokalemia- (present on admission)  Assessment & Plan  Kdur  Follow bmp    Chronic venous insufficiency of lower extremity- (present on admission)  Assessment & Plan  compression stockings.    Mixed hyperlipidemia- (present on admission)  Assessment & Plan  Atorvastatin.     Lower limb amputation, great toe (HCC)- (present on  admission)  Assessment & Plan  Monitor  No signs of infection     Class 2 severe obesity with serious comorbidity and body mass index (BMI) of 35.0 to 35.9 in adult (HCC)- (present on admission)  Assessment & Plan  Body mass index is 35.19 kg/m².       VTE prophylaxis: Lovenox

## 2021-06-25 NOTE — CARE PLAN
The patient is Watcher - Medium risk of patient condition declining or worsening    Shift Goals  Clinical Goals: Mobility, pain control   Patient Goals: pain control   Family Goals: n/a    Progress made toward(s) clinical / shift goals: Pt moved frequently for linen changing     Patient is not progressing towards the following goals:      Problem: Fall Risk  Goal: Patient will remain free from falls  Outcome: Progressing  Note: Pt uses call light appropriately. Pt refuses to ambulate     Problem: Pain - Standard  Goal: Alleviation of pain or a reduction in pain to the patient’s comfort goal  Note: Pt medicated for pain per MAR. Pt declines additional intervention

## 2021-06-25 NOTE — DISCHARGE PLANNING
"Agency/Facility Name: [facility]  Spoke To: Saskia  Outcome: Per Saskia pts ins. It self should not be a barrier. [facility] can not accept this pt because they do not have physic in their facilities. Saskia states that once a physic MD was to clear this pt and D/C any psychic meds, pt would no longer have behavior issues as a barrier. Olaf reiterated that this pt is a \"forever no\" at these facilities.     Agency/Facility [facility]  Spoke To: Sathish  Outcome: Per Sathish if pts behavioral status if cleared by physic MD there would be no barriers to acceptance. Sathish stated to resend referral.       "

## 2021-06-26 LAB
ANION GAP SERPL CALC-SCNC: 9 MMOL/L (ref 7–16)
BASOPHILS # BLD AUTO: 0.5 % (ref 0–1.8)
BASOPHILS # BLD: 0.05 K/UL (ref 0–0.12)
BUN SERPL-MCNC: 24 MG/DL (ref 8–22)
CALCIUM SERPL-MCNC: 8.3 MG/DL (ref 8.5–10.5)
CHLORIDE SERPL-SCNC: 107 MMOL/L (ref 96–112)
CO2 SERPL-SCNC: 20 MMOL/L (ref 20–33)
CREAT SERPL-MCNC: 1.49 MG/DL (ref 0.5–1.4)
EOSINOPHIL # BLD AUTO: 0.51 K/UL (ref 0–0.51)
EOSINOPHIL NFR BLD: 4.7 % (ref 0–6.9)
ERYTHROCYTE [DISTWIDTH] IN BLOOD BY AUTOMATED COUNT: 53.9 FL (ref 35.9–50)
GLUCOSE SERPL-MCNC: 111 MG/DL (ref 65–99)
HCT VFR BLD AUTO: 32.9 % (ref 42–52)
HGB BLD-MCNC: 10 G/DL (ref 14–18)
IMM GRANULOCYTES # BLD AUTO: 0.06 K/UL (ref 0–0.11)
IMM GRANULOCYTES NFR BLD AUTO: 0.6 % (ref 0–0.9)
LYMPHOCYTES # BLD AUTO: 2.17 K/UL (ref 1–4.8)
LYMPHOCYTES NFR BLD: 20 % (ref 22–41)
MAGNESIUM SERPL-MCNC: 1.7 MG/DL (ref 1.5–2.5)
MCH RBC QN AUTO: 28.3 PG (ref 27–33)
MCHC RBC AUTO-ENTMCNC: 30.4 G/DL (ref 33.7–35.3)
MCV RBC AUTO: 93.2 FL (ref 81.4–97.8)
MONOCYTES # BLD AUTO: 0.81 K/UL (ref 0–0.85)
MONOCYTES NFR BLD AUTO: 7.5 % (ref 0–13.4)
NEUTROPHILS # BLD AUTO: 7.23 K/UL (ref 1.82–7.42)
NEUTROPHILS NFR BLD: 66.7 % (ref 44–72)
NRBC # BLD AUTO: 0 K/UL
NRBC BLD-RTO: 0 /100 WBC
NT-PROBNP SERPL IA-MCNC: 3029 PG/ML (ref 0–125)
PLATELET # BLD AUTO: 192 K/UL (ref 164–446)
PMV BLD AUTO: 11.9 FL (ref 9–12.9)
POTASSIUM SERPL-SCNC: 3.7 MMOL/L (ref 3.6–5.5)
RBC # BLD AUTO: 3.53 M/UL (ref 4.7–6.1)
SODIUM SERPL-SCNC: 136 MMOL/L (ref 135–145)
WBC # BLD AUTO: 10.8 K/UL (ref 4.8–10.8)

## 2021-06-26 PROCEDURE — A9270 NON-COVERED ITEM OR SERVICE: HCPCS | Performed by: INTERNAL MEDICINE

## 2021-06-26 PROCEDURE — 700102 HCHG RX REV CODE 250 W/ 637 OVERRIDE(OP): Performed by: INTERNAL MEDICINE

## 2021-06-26 PROCEDURE — 85025 COMPLETE CBC W/AUTO DIFF WBC: CPT

## 2021-06-26 PROCEDURE — 700102 HCHG RX REV CODE 250 W/ 637 OVERRIDE(OP): Performed by: FAMILY MEDICINE

## 2021-06-26 PROCEDURE — 700111 HCHG RX REV CODE 636 W/ 250 OVERRIDE (IP): Performed by: FAMILY MEDICINE

## 2021-06-26 PROCEDURE — 83880 ASSAY OF NATRIURETIC PEPTIDE: CPT

## 2021-06-26 PROCEDURE — A9270 NON-COVERED ITEM OR SERVICE: HCPCS | Performed by: FAMILY MEDICINE

## 2021-06-26 PROCEDURE — 80048 BASIC METABOLIC PNL TOTAL CA: CPT

## 2021-06-26 PROCEDURE — 770001 HCHG ROOM/CARE - MED/SURG/GYN PRIV*

## 2021-06-26 PROCEDURE — 99231 SBSQ HOSP IP/OBS SF/LOW 25: CPT | Performed by: FAMILY MEDICINE

## 2021-06-26 PROCEDURE — 36415 COLL VENOUS BLD VENIPUNCTURE: CPT

## 2021-06-26 PROCEDURE — 83735 ASSAY OF MAGNESIUM: CPT

## 2021-06-26 RX ORDER — POTASSIUM CHLORIDE 20 MEQ/1
20 TABLET, EXTENDED RELEASE ORAL 2 TIMES DAILY
Status: DISCONTINUED | OUTPATIENT
Start: 2021-06-26 | End: 2021-06-27

## 2021-06-26 RX ORDER — MAGNESIUM SULFATE HEPTAHYDRATE 40 MG/ML
2 INJECTION, SOLUTION INTRAVENOUS ONCE
Status: COMPLETED | OUTPATIENT
Start: 2021-06-26 | End: 2021-06-26

## 2021-06-26 RX ADMIN — AMIODARONE HYDROCHLORIDE 200 MG: 200 TABLET ORAL at 17:15

## 2021-06-26 RX ADMIN — POTASSIUM CHLORIDE 40 MEQ: 1500 TABLET, EXTENDED RELEASE ORAL at 06:13

## 2021-06-26 RX ADMIN — APIXABAN 5 MG: 5 TABLET, FILM COATED ORAL at 17:14

## 2021-06-26 RX ADMIN — TAMSULOSIN HYDROCHLORIDE 0.4 MG: 0.4 CAPSULE ORAL at 08:45

## 2021-06-26 RX ADMIN — HYDROCORTISONE 5 MG: 5 TABLET ORAL at 20:57

## 2021-06-26 RX ADMIN — CLONAZEPAM 1 MG: 1 TABLET ORAL at 17:15

## 2021-06-26 RX ADMIN — CLONAZEPAM 0.5 MG: 0.5 TABLET ORAL at 08:45

## 2021-06-26 RX ADMIN — Medication 1000 UNITS: at 06:12

## 2021-06-26 RX ADMIN — ZIPRASIDONE HYDROCHLORIDE 60 MG: 60 CAPSULE ORAL at 09:52

## 2021-06-26 RX ADMIN — AMIODARONE HYDROCHLORIDE 200 MG: 200 TABLET ORAL at 06:12

## 2021-06-26 RX ADMIN — APIXABAN 5 MG: 5 TABLET, FILM COATED ORAL at 06:13

## 2021-06-26 RX ADMIN — OMEPRAZOLE 40 MG: 20 CAPSULE, DELAYED RELEASE ORAL at 17:15

## 2021-06-26 RX ADMIN — POTASSIUM CHLORIDE 20 MEQ: 1500 TABLET, EXTENDED RELEASE ORAL at 17:15

## 2021-06-26 RX ADMIN — PAROXETINE HYDROCHLORIDE 20 MG: 20 TABLET, FILM COATED ORAL at 08:45

## 2021-06-26 RX ADMIN — OXYCODONE 5 MG: 5 TABLET ORAL at 06:12

## 2021-06-26 RX ADMIN — LEVOTHYROXINE SODIUM 125 MCG: 0.12 TABLET ORAL at 06:13

## 2021-06-26 RX ADMIN — OXYCODONE 10 MG: 5 TABLET ORAL at 20:57

## 2021-06-26 RX ADMIN — FUROSEMIDE 20 MG: 20 TABLET ORAL at 06:13

## 2021-06-26 RX ADMIN — HYDROCORTISONE 5 MG: 5 TABLET ORAL at 06:16

## 2021-06-26 RX ADMIN — ATORVASTATIN CALCIUM 40 MG: 40 TABLET, FILM COATED ORAL at 17:14

## 2021-06-26 RX ADMIN — OXYCODONE 10 MG: 5 TABLET ORAL at 09:52

## 2021-06-26 RX ADMIN — ZIPRASIDONE HYDROCHLORIDE 60 MG: 60 CAPSULE ORAL at 20:57

## 2021-06-26 RX ADMIN — MAGNESIUM SULFATE 2 G: 2 INJECTION INTRAVENOUS at 08:40

## 2021-06-26 RX ADMIN — METOPROLOL TARTRATE 25 MG: 25 TABLET, FILM COATED ORAL at 17:14

## 2021-06-26 RX ADMIN — OMEPRAZOLE 40 MG: 20 CAPSULE, DELAYED RELEASE ORAL at 06:13

## 2021-06-26 RX ADMIN — METOPROLOL TARTRATE 25 MG: 25 TABLET, FILM COATED ORAL at 06:12

## 2021-06-26 RX ADMIN — OXYCODONE 5 MG: 5 TABLET ORAL at 17:15

## 2021-06-26 ASSESSMENT — ENCOUNTER SYMPTOMS
CHILLS: 0
SHORTNESS OF BREATH: 0
HEARTBURN: 0
FEVER: 0
NERVOUS/ANXIOUS: 0
DIZZINESS: 0
FOCAL WEAKNESS: 0
BACK PAIN: 0
BLURRED VISION: 0
DIARRHEA: 0
WEAKNESS: 1
SENSORY CHANGE: 0
DIAPHORESIS: 0
FLANK PAIN: 0
HALLUCINATIONS: 0
NAUSEA: 0
NECK PAIN: 0
DEPRESSION: 0
WHEEZING: 0
HEADACHES: 0
VOMITING: 0
PALPITATIONS: 0
MYALGIAS: 0
SORE THROAT: 0
COUGH: 0
ABDOMINAL PAIN: 1
SPEECH CHANGE: 0

## 2021-06-26 ASSESSMENT — PAIN DESCRIPTION - PAIN TYPE
TYPE: ACUTE PAIN

## 2021-06-26 ASSESSMENT — FIBROSIS 4 INDEX: FIB4 SCORE: 1.28

## 2021-06-26 NOTE — PROGRESS NOTES
Pt is A&O 4  Pain medicated per MAR   - nausea  Tolerating a low fiber diet   Wound vac to the abdomen  Interdry to the abdomen with barrier paste  Redness to the buttock, barrier past applied   Generalized edema   Pure wick in place  BM 6/26  Up max assist  SCD's refused  Bed alarm on, pt high fall risk per juli chávez  Reviewed plan of care with patient, bed in lowest position and locked, pt resting comfortably now, call light within reach, all needs met at this time. Interventions will be executed per plan of care

## 2021-06-26 NOTE — CARE PLAN
The patient is Stable - Low risk of patient condition declining or worsening    Shift Goals  Clinical Goals: Q2 turns, pain control  Patient Goals: pain control   Family Goals: n/a    Progress made toward(s) clinical / shift goals:  Pt turned when allowed staff to. Pain medicated per MAR    Patient is not progressing towards the following goals:      Problem: Fall Risk  Goal: Patient will remain free from falls  Outcome: Progressing  Note: Pt calls for assistance      Problem: Pain - Standard  Goal: Alleviation of pain or a reduction in pain to the patient’s comfort goal  Outcome: Progressing  Note: Pt medicated for pain per MAR. Pt declines heat pack

## 2021-06-26 NOTE — CARE PLAN
The patient is Stable - Low risk of patient condition declining or worsening    Shift Goals  Clinical Goals: Q2 turns, pain control  Patient Goals: pain control   Family Goals: n/a      Progress made toward(s) clinical / shift goals:      Problem: Provide Safe Environment  Goal: Suicide environmental safety, protocols, policies, and practices will be implemented  Outcome: Progressing     Problem: Pain - Standard  Goal: Alleviation of pain or a reduction in pain to the patient’s comfort goal  Outcome: Progressing       Patient is not progressing towards the following goals:

## 2021-06-26 NOTE — PROGRESS NOTES
[facility] Medicine Daily Progress Note    Date of Service  6/26/2021    Chief Complaint  56 y.o. male admitted 2/9/2021 with Syncope.    Hospital Course  Admitted with syncope, he was noted to be bradycardic and hypotensive.  He has known history of permanent atrial fibrillation and he was on diltiazem, digoxin and metoprolol.  These medications were held.  Later on he was restarted on low-dose Toprol and Digoxin.  He was also started on anticoagulation with Eliquis.  He also had a possible history of adrenal insufficiency, he was already on Florinef and midodrine.  Midodrine was discontinued and he was started on cortisol. Further work-up showed that he had gram-negative bacteremia as well as urinary tract infection and acute kidney injury.  He was initially placed on IV Zosyn, later changed to oral Bactrim to finish the course.  He was carefully hydrated with IV fluids.  On admission he also expressed suicidal ideation.  Psychiatry was consulted the case, he was placed on a legal hold.  He was noted to have severe depression.  He has been placed on medication the form of Geodon, Paxil, Klonopin.  He has a colostomy in place, which the patient believes contributes to his severe depression because of ostomy care.  He had history of bowel perforation and splenic fluid collection requiring Segmental colectomy, colostomy creation, mobilization of splenic flexure, wound VAC placement, irrigation and debridement of flank wound on 11/10/2020.  Surgery was consulted on the case, and he underwent exploratory laparotomy and colostomy takedown and closure on 5/27/2021.  Wound VAC was later placed.  Psychiatry continued to follow the patient, his legal hold was discontinued on 6/4/2021    Interval Problem Update  Colostomy - pain controlled  Afib - rate 50-90  SRUTHI - crea 1.4  Low magnesium    Consultants/Specialty  Cardiology  Psychiatry  Surgery    Code Status  Full Code    Disposition  SNF     Review of Systems  Review of  Systems   Constitutional: Negative for chills, diaphoresis, fever and malaise/fatigue.   HENT: Negative for congestion, hearing loss and sore throat.    Eyes: Negative for blurred vision.   Respiratory: Negative for cough, shortness of breath and wheezing.    Cardiovascular: Negative for chest pain, palpitations and leg swelling.   Gastrointestinal: Positive for abdominal pain. Negative for diarrhea, heartburn, nausea and vomiting.   Genitourinary: Negative for dysuria, flank pain and hematuria.   Musculoskeletal: Negative for back pain, joint pain, myalgias and neck pain.   Skin: Negative for rash.   Neurological: Positive for weakness. Negative for dizziness, sensory change, speech change, focal weakness and headaches.   Psychiatric/Behavioral: Negative for depression, hallucinations and suicidal ideas. The patient is not nervous/anxious.       Physical Exam  Temp:  [36.4 °C (97.5 °F)-37.1 °C (98.7 °F)] 36.4 °C (97.6 °F)  Pulse:  [56-94] 56  Resp:  [16-20] 16  BP: (104-113)/(62-76) 109/72  SpO2:  [92 %-94 %] 92 %    Physical Exam  Vitals and nursing note reviewed.   Constitutional:       Appearance: He is obese.   HENT:      Head: Normocephalic and atraumatic.      Nose: No congestion.      Mouth/Throat:      Mouth: Mucous membranes are moist.   Eyes:      Extraocular Movements: Extraocular movements intact.      Conjunctiva/sclera: Conjunctivae normal.      Pupils: Pupils are equal, round, and reactive to light.   Cardiovascular:      Rate and Rhythm: Normal rate. Rhythm irregularly irregular.   Pulmonary:      Effort: Pulmonary effort is normal.      Breath sounds: Normal breath sounds.   Abdominal:      General: There is distension.      Tenderness: There is abdominal tenderness. There is no guarding or rebound.      Comments: Wound vac   Musculoskeletal:      Cervical back: No muscular tenderness.      Right lower leg: Edema present.      Left lower leg: Edema present.   Skin:     General: Skin is warm and  dry.   Neurological:      General: No focal deficit present.      Mental Status: He is alert and oriented to person, place, and time.      Cranial Nerves: No cranial nerve deficit.   Psychiatric:         Mood and Affect: Mood is not anxious or depressed.         Thought Content: Thought content is not paranoid or delusional. Thought content does not include homicidal or suicidal ideation. Thought content does not include homicidal or suicidal plan.       Fluids    Intake/Output Summary (Last 24 hours) at 6/26/2021 1252  Last data filed at 6/26/2021 0744  Gross per 24 hour   Intake --   Output 1150 ml   Net -1150 ml       Laboratory  Recent Labs     06/24/21  0320 06/25/21  0356 06/26/21  0433   WBC 9.3 10.8 10.8   RBC 3.48* 3.54* 3.53*   HEMOGLOBIN 10.0* 10.1* 10.0*   HEMATOCRIT 31.3* 32.5* 32.9*   MCV 89.9 91.8 93.2   MCH 28.7 28.5 28.3   MCHC 31.9* 31.1* 30.4*   RDW 52.4* 52.8* 53.9*   PLATELETCT 209 221 192   MPV 11.4 12.2 11.9     Recent Labs     06/24/21  0320 06/25/21  0356 06/26/21  0433   SODIUM 138 138 136   POTASSIUM 3.1* 3.4* 3.7   CHLORIDE 105 104 107   CO2 23 25 20   GLUCOSE 108* 104* 111*   BUN 25* 24* 24*   CREATININE 1.65* 1.65* 1.49*   CALCIUM 8.1* 8.5 8.3*                   Imaging  US-RENAL   Final Result      1.  Low-level echogenic debris in the urinary bladder is noted which could indicate UTI or blood within the bladder.      2.  No hydronephrosis.      IR-MIDLINE CATHETER INSERTION WO GUIDANCE > AGE 3   Final Result                  Ultrasound-guided midline placement performed by qualified nursing staff    as above.          EC-ECHOCARDIOGRAM COMPLETE W/ CONT   Final Result      DX-CHEST-PORTABLE (1 VIEW)   Final Result      1.  Minor asymmetric interstitial opacity in the right lower lobe which could represent minor interstitial edema, pneumonia, or fibrotic change.      2.  No left lung consolidation.      3.  No evidence of congestive failure.      [id number]-5 VIEW   Final Result          No specific finding to suggest small bowel obstruction.      [id number]-9 VIEW   Final Result         No significant interval change.      DX-CHEST-LIMITED (1 VIEW)   Final Result         Diffuse interstitial prominence could relate to mild pulmonary edema or atypical infection      XP-[id number]-4 VIEW   Final Result      Increased colonic gas without definite bowel obstruction.      US-RENAL   Final Result      No evidence of hydronephrosis.      Lobulated kidneys bilaterally.      CT-ABDOMEN-PELVIS WITH   Final Result      1.  There is no evidence of small bowel obstruction.   2.  There is a left lower quadrant ostomy.   3.  There is fluid distention of the colon distal to the surgical material in the left mid descending colon extending all the way to the rectum. There is no pneumatosis or free air.   4.  There is cholelithiasis without biliary dilatation.   5.  There has been interval removal of the drainage catheter anterior to the spleen with minimal hypodense area in the anterior spleen again noted. There is no new fluid collection.      IR-US GUIDED PIV   Final Result    Ultrasound-guided PERIPHERAL IV INSERTION performed by    qualified nursing staff as above.      EC-ECHOCARDIOGRAM COMPLETE W/O CONT   Final Result      DX-LUMBAR SPINE-2 OR 3 VIEWS   Final Result      Moderate compression deformity of T11 is new compared to 2018.      Mild wedge deformity of T12 is unchanged.      Degenerative changes including facet arthropathy.      Mild retrolisthesis of L5 on S1 and L3 on L4.              Assessment/Plan  Urinary tract infection- (present on admission)  Assessment & Plan  Completed 10-day course of Bactrim    Gram-negative bacteremia- (present on admission)  Assessment & Plan  Completed 10-day course of Bactrim    Major depressive disorder, recurrent, severe with psychotic features (HCC)- (present on admission)  Assessment & Plan  Geodon, Paxil, Klonopin    Colostomy present (HCC)- (present on  admission)  Assessment & Plan  Exploratory laparotomy with colostomy takedown and closure 5/27/2021  Segmental colectomy, colostomy creation, mobilization of splenic flexure, wound VAC placement, irrigation and debridement of flank wound on 11/10/2020.  Pain control  Wound care        Suicidal ideation- (present on admission)  Assessment & Plan  Legal hold discontinued 6/4/2021    Normocytic anemia- (present on admission)  Assessment & Plan  Follow cbc    Leukocytosis- (present on admission)  Assessment & Plan  Follow cbc    Obstructive uropathy- (present on admission)  Assessment & Plan  resolved    Permanent atrial fibrillation (HCC)- (present on admission)  Assessment & Plan  Metoprolol, Eliquis    Chronic heart failure with preserved ejection fraction (HCC)- (present on admission)  Assessment & Plan  Metoprolol, Lasix  Fluid restriction of 1.5 L per day      Type 2 diabetes mellitus with hyperglycemia, with long-term current use of insulin (HCC)- (present on admission)  Assessment & Plan  Follow bmp    Hypothyroidism- (present on admission)  Assessment & Plan  Increased Levothyroxine to 125 mcg  Check TSH on 6/29/2021    SRUTHI (acute kidney injury) (HCC)- (present on admission)  Assessment & Plan  Follow bmp    Adrenal insufficiency (HCC)- (present on admission)  Assessment & Plan  Cortef 5 mg BID  Plan to decrease to daily on 7/4/2021    Debility- (present on admission)  Assessment & Plan  PT and OT    Orthostatic hypotension- (present on admission)  Assessment & Plan  off Florinef.    Hypomagnesemia- (present on admission)  Assessment & Plan  IV Mg 2 g  Follow level    Hypokalemia- (present on admission)  Assessment & Plan  Kdur  Follow bmp    Chronic venous insufficiency of lower extremity- (present on admission)  Assessment & Plan  compression stockings.    Mixed hyperlipidemia- (present on admission)  Assessment & Plan  Atorvastatin.     Lower limb amputation, great toe (HCC)- (present on  admission)  Assessment & Plan  Monitor  No signs of infection     Class 2 severe obesity with serious comorbidity and body mass index (BMI) of 35.0 to 35.9 in adult (HCC)- (present on admission)  Assessment & Plan  Body mass index is 35.19 kg/m².       VTE prophylaxis: Lovenox

## 2021-06-26 NOTE — PROGRESS NOTES
Received bedside report from Night RN. Pt awake and alert. Bed alarm in use. Complains of pain, received pain medications as ordered. Discussed plan of care. Continues to have urinary and bowel incontinence. Tolerating low fiber diet. Call bell in reach. Wound vac to abdomen. Interdry to abdomen with barrier paste. /72   Pulse (!) 56   Temp 36.4 °C (97.6 °F) (Temporal)   Resp 16   Ht 1.829 m (6')   Wt (!) 168 kg (369 lb 4.3 oz)   SpO2 92%   BMI 50.08 kg/m²

## 2021-06-27 LAB
ANION GAP SERPL CALC-SCNC: 8 MMOL/L (ref 7–16)
BASOPHILS # BLD AUTO: 0.5 % (ref 0–1.8)
BASOPHILS # BLD: 0.05 K/UL (ref 0–0.12)
BUN SERPL-MCNC: 26 MG/DL (ref 8–22)
C DIFF DNA SPEC QL NAA+PROBE: NEGATIVE
C DIFF TOX GENS STL QL NAA+PROBE: NEGATIVE
CALCIUM SERPL-MCNC: 8.4 MG/DL (ref 8.5–10.5)
CHLORIDE SERPL-SCNC: 107 MMOL/L (ref 96–112)
CO2 SERPL-SCNC: 21 MMOL/L (ref 20–33)
CREAT SERPL-MCNC: 1.4 MG/DL (ref 0.5–1.4)
EOSINOPHIL # BLD AUTO: 0.48 K/UL (ref 0–0.51)
EOSINOPHIL NFR BLD: 4.6 % (ref 0–6.9)
ERYTHROCYTE [DISTWIDTH] IN BLOOD BY AUTOMATED COUNT: 53 FL (ref 35.9–50)
GLUCOSE SERPL-MCNC: 119 MG/DL (ref 65–99)
HCT VFR BLD AUTO: 32.4 % (ref 42–52)
HGB BLD-MCNC: 10.3 G/DL (ref 14–18)
IMM GRANULOCYTES # BLD AUTO: 0.04 K/UL (ref 0–0.11)
IMM GRANULOCYTES NFR BLD AUTO: 0.4 % (ref 0–0.9)
LYMPHOCYTES # BLD AUTO: 1.97 K/UL (ref 1–4.8)
LYMPHOCYTES NFR BLD: 19 % (ref 22–41)
MAGNESIUM SERPL-MCNC: 1.8 MG/DL (ref 1.5–2.5)
MCH RBC QN AUTO: 28.9 PG (ref 27–33)
MCHC RBC AUTO-ENTMCNC: 31.8 G/DL (ref 33.7–35.3)
MCV RBC AUTO: 91 FL (ref 81.4–97.8)
MONOCYTES # BLD AUTO: 0.76 K/UL (ref 0–0.85)
MONOCYTES NFR BLD AUTO: 7.3 % (ref 0–13.4)
NEUTROPHILS # BLD AUTO: 7.07 K/UL (ref 1.82–7.42)
NEUTROPHILS NFR BLD: 68.2 % (ref 44–72)
NRBC # BLD AUTO: 0 K/UL
NRBC BLD-RTO: 0 /100 WBC
PLATELET # BLD AUTO: 191 K/UL (ref 164–446)
PMV BLD AUTO: 11.4 FL (ref 9–12.9)
POTASSIUM SERPL-SCNC: 3.3 MMOL/L (ref 3.6–5.5)
RBC # BLD AUTO: 3.56 M/UL (ref 4.7–6.1)
SODIUM SERPL-SCNC: 136 MMOL/L (ref 135–145)
WBC # BLD AUTO: 10.4 K/UL (ref 4.8–10.8)

## 2021-06-27 PROCEDURE — 700102 HCHG RX REV CODE 250 W/ 637 OVERRIDE(OP): Performed by: INTERNAL MEDICINE

## 2021-06-27 PROCEDURE — A9270 NON-COVERED ITEM OR SERVICE: HCPCS | Performed by: INTERNAL MEDICINE

## 2021-06-27 PROCEDURE — A9270 NON-COVERED ITEM OR SERVICE: HCPCS | Performed by: FAMILY MEDICINE

## 2021-06-27 PROCEDURE — 700102 HCHG RX REV CODE 250 W/ 637 OVERRIDE(OP): Performed by: FAMILY MEDICINE

## 2021-06-27 PROCEDURE — 36415 COLL VENOUS BLD VENIPUNCTURE: CPT

## 2021-06-27 PROCEDURE — 770001 HCHG ROOM/CARE - MED/SURG/GYN PRIV*

## 2021-06-27 PROCEDURE — 700111 HCHG RX REV CODE 636 W/ 250 OVERRIDE (IP): Performed by: FAMILY MEDICINE

## 2021-06-27 PROCEDURE — 85025 COMPLETE CBC W/AUTO DIFF WBC: CPT

## 2021-06-27 PROCEDURE — 99232 SBSQ HOSP IP/OBS MODERATE 35: CPT | Performed by: FAMILY MEDICINE

## 2021-06-27 PROCEDURE — 83735 ASSAY OF MAGNESIUM: CPT

## 2021-06-27 PROCEDURE — 87493 C DIFF AMPLIFIED PROBE: CPT

## 2021-06-27 PROCEDURE — 80048 BASIC METABOLIC PNL TOTAL CA: CPT

## 2021-06-27 RX ORDER — POTASSIUM CHLORIDE 20 MEQ/1
40 TABLET, EXTENDED RELEASE ORAL 2 TIMES DAILY
Status: DISCONTINUED | OUTPATIENT
Start: 2021-06-27 | End: 2021-07-06

## 2021-06-27 RX ORDER — MAGNESIUM SULFATE HEPTAHYDRATE 40 MG/ML
2 INJECTION, SOLUTION INTRAVENOUS ONCE
Status: COMPLETED | OUTPATIENT
Start: 2021-06-27 | End: 2021-06-27

## 2021-06-27 RX ADMIN — FUROSEMIDE 20 MG: 20 TABLET ORAL at 05:07

## 2021-06-27 RX ADMIN — OXYCODONE 10 MG: 5 TABLET ORAL at 01:08

## 2021-06-27 RX ADMIN — HYDROCORTISONE 5 MG: 5 TABLET ORAL at 14:34

## 2021-06-27 RX ADMIN — TAMSULOSIN HYDROCHLORIDE 0.4 MG: 0.4 CAPSULE ORAL at 08:43

## 2021-06-27 RX ADMIN — ZIPRASIDONE HYDROCHLORIDE 60 MG: 60 CAPSULE ORAL at 20:51

## 2021-06-27 RX ADMIN — APIXABAN 5 MG: 5 TABLET, FILM COATED ORAL at 17:45

## 2021-06-27 RX ADMIN — AMIODARONE HYDROCHLORIDE 200 MG: 200 TABLET ORAL at 17:44

## 2021-06-27 RX ADMIN — HYDROCORTISONE 5 MG: 5 TABLET ORAL at 05:06

## 2021-06-27 RX ADMIN — ATORVASTATIN CALCIUM 40 MG: 40 TABLET, FILM COATED ORAL at 17:44

## 2021-06-27 RX ADMIN — PAROXETINE HYDROCHLORIDE 20 MG: 20 TABLET, FILM COATED ORAL at 08:43

## 2021-06-27 RX ADMIN — AMIODARONE HYDROCHLORIDE 200 MG: 200 TABLET ORAL at 05:06

## 2021-06-27 RX ADMIN — CLONAZEPAM 0.5 MG: 0.5 TABLET ORAL at 08:42

## 2021-06-27 RX ADMIN — LEVOTHYROXINE SODIUM 125 MCG: 0.12 TABLET ORAL at 05:07

## 2021-06-27 RX ADMIN — OMEPRAZOLE 40 MG: 20 CAPSULE, DELAYED RELEASE ORAL at 17:44

## 2021-06-27 RX ADMIN — POTASSIUM CHLORIDE 40 MEQ: 1500 TABLET, EXTENDED RELEASE ORAL at 17:44

## 2021-06-27 RX ADMIN — OXYCODONE 5 MG: 5 TABLET ORAL at 14:36

## 2021-06-27 RX ADMIN — Medication 1000 UNITS: at 05:06

## 2021-06-27 RX ADMIN — MAGNESIUM SULFATE 2 G: 2 INJECTION INTRAVENOUS at 08:43

## 2021-06-27 RX ADMIN — APIXABAN 5 MG: 5 TABLET, FILM COATED ORAL at 05:06

## 2021-06-27 RX ADMIN — POTASSIUM CHLORIDE 20 MEQ: 1500 TABLET, EXTENDED RELEASE ORAL at 05:06

## 2021-06-27 RX ADMIN — OXYCODONE 10 MG: 5 TABLET ORAL at 08:54

## 2021-06-27 RX ADMIN — OMEPRAZOLE 40 MG: 20 CAPSULE, DELAYED RELEASE ORAL at 05:06

## 2021-06-27 RX ADMIN — METOPROLOL TARTRATE 25 MG: 25 TABLET, FILM COATED ORAL at 17:44

## 2021-06-27 RX ADMIN — CLONAZEPAM 1 MG: 1 TABLET ORAL at 17:44

## 2021-06-27 RX ADMIN — OXYCODONE 10 MG: 5 TABLET ORAL at 21:09

## 2021-06-27 RX ADMIN — METOPROLOL TARTRATE 25 MG: 25 TABLET, FILM COATED ORAL at 05:07

## 2021-06-27 RX ADMIN — ZIPRASIDONE HYDROCHLORIDE 60 MG: 60 CAPSULE ORAL at 09:32

## 2021-06-27 ASSESSMENT — ENCOUNTER SYMPTOMS
NERVOUS/ANXIOUS: 0
DIZZINESS: 0
SHORTNESS OF BREATH: 0
FEVER: 0
SORE THROAT: 0
ABDOMINAL PAIN: 1
CHILLS: 0
BACK PAIN: 0
HALLUCINATIONS: 0
VOMITING: 0
NECK PAIN: 0
HEARTBURN: 0
BLURRED VISION: 0
SENSORY CHANGE: 0
DIARRHEA: 0
PALPITATIONS: 0
HEADACHES: 0
FLANK PAIN: 0
WEAKNESS: 1
DIAPHORESIS: 0
SPEECH CHANGE: 0
WHEEZING: 0
NAUSEA: 0
FOCAL WEAKNESS: 0
DEPRESSION: 0
COUGH: 0
MYALGIAS: 0

## 2021-06-27 ASSESSMENT — PAIN DESCRIPTION - PAIN TYPE
TYPE: ACUTE PAIN

## 2021-06-27 NOTE — PROGRESS NOTES
[facility] Medicine Daily Progress Note    Date of Service  6/27/2021    Chief Complaint  56 y.o. male admitted 2/9/2021 with Syncope.    Hospital Course  Admitted with syncope, he was noted to be bradycardic and hypotensive.  He has known history of permanent atrial fibrillation and he was on diltiazem, digoxin and metoprolol.  These medications were held.  Later on he was restarted on low-dose Toprol and Digoxin.  He was also started on anticoagulation with Eliquis.  He also had a possible history of adrenal insufficiency, he was already on Florinef and midodrine.  Midodrine was discontinued and he was started on cortisol. Further work-up showed that he had gram-negative bacteremia as well as urinary tract infection and acute kidney injury.  He was initially placed on IV Zosyn, later changed to oral Bactrim to finish the course.  He was carefully hydrated with IV fluids.  On admission he also expressed suicidal ideation.  Psychiatry was consulted the case, he was placed on a legal hold.  He was noted to have severe depression.  He has been placed on medication the form of Geodon, Paxil, Klonopin.  He has a colostomy in place, which the patient believes contributes to his severe depression because of ostomy care.  He had history of bowel perforation and splenic fluid collection requiring Segmental colectomy, colostomy creation, mobilization of splenic flexure, wound VAC placement, irrigation and debridement of flank wound on 11/10/2020.  Surgery was consulted on the case, and he underwent exploratory laparotomy and colostomy takedown and closure on 5/27/2021.  Wound VAC was later placed.  Psychiatry continued to follow the patient, his legal hold was discontinued on 6/4/2021    Interval Problem Update  Colostomy - pain controlled  Afib - rate 60-90  SRUTHI - crea 1.4  Low magnesium, potassium    Consultants/Specialty  Cardiology  Psychiatry  Surgery    Code Status  Full Code    Disposition  SNF - medically cleared,  pending acceptance    Review of Systems  Review of Systems   Constitutional: Negative for chills, diaphoresis, fever and malaise/fatigue.   HENT: Negative for congestion, hearing loss and sore throat.    Eyes: Negative for blurred vision.   Respiratory: Negative for cough, shortness of breath and wheezing.    Cardiovascular: Negative for chest pain, palpitations and leg swelling.   Gastrointestinal: Positive for abdominal pain. Negative for diarrhea, heartburn, nausea and vomiting.   Genitourinary: Negative for dysuria, flank pain and hematuria.   Musculoskeletal: Negative for back pain, joint pain, myalgias and neck pain.   Skin: Negative for rash.   Neurological: Positive for weakness. Negative for dizziness, sensory change, speech change, focal weakness and headaches.   Psychiatric/Behavioral: Negative for depression, hallucinations and suicidal ideas. The patient is not nervous/anxious.       Physical Exam  Temp:  [36 °C (96.8 °F)-37 °C (98.6 °F)] 36 °C (96.8 °F)  Pulse:  [68-92] 92  Resp:  [16] 16  BP: (105-122)/(52-77) 122/77  SpO2:  [93 %-96 %] 93 %    Physical Exam  Vitals and nursing note reviewed.   Constitutional:       Appearance: He is obese.   HENT:      Head: Normocephalic and atraumatic.      Nose: No congestion.      Mouth/Throat:      Mouth: Mucous membranes are moist.   Eyes:      Extraocular Movements: Extraocular movements intact.      Conjunctiva/sclera: Conjunctivae normal.      Pupils: Pupils are equal, round, and reactive to light.   Cardiovascular:      Rate and Rhythm: Normal rate. Rhythm irregularly irregular.   Pulmonary:      Effort: Pulmonary effort is normal.      Breath sounds: Normal breath sounds.   Abdominal:      General: There is distension.      Tenderness: There is abdominal tenderness. There is no guarding or rebound.      Comments: Wound vac   Musculoskeletal:      Cervical back: No muscular tenderness.      Right lower leg: Edema present.      Left lower leg: Edema present.    Skin:     General: Skin is warm and dry.   Neurological:      General: No focal deficit present.      Mental Status: He is alert and oriented to person, place, and time.      Cranial Nerves: No cranial nerve deficit.   Psychiatric:         Mood and Affect: Mood is not anxious or depressed.         Thought Content: Thought content is not paranoid or delusional. Thought content does not include homicidal or suicidal ideation. Thought content does not include homicidal or suicidal plan.       Fluids    Intake/Output Summary (Last 24 hours) at 6/27/2021 1120  Last data filed at 6/27/2021 1043  Gross per 24 hour   Intake 50 ml   Output 800 ml   Net -750 ml       Laboratory  Recent Labs     06/25/21  0356 06/26/21  0433 06/27/21  0422   WBC 10.8 10.8 10.4   RBC 3.54* 3.53* 3.56*   HEMOGLOBIN 10.1* 10.0* 10.3*   HEMATOCRIT 32.5* 32.9* 32.4*   MCV 91.8 93.2 91.0   MCH 28.5 28.3 28.9   MCHC 31.1* 30.4* 31.8*   RDW 52.8* 53.9* 53.0*   PLATELETCT 221 192 191   MPV 12.2 11.9 11.4     Recent Labs     06/25/21  0356 06/26/21  0433 06/27/21  0422   SODIUM 138 136 136   POTASSIUM 3.4* 3.7 3.3*   CHLORIDE 104 107 107   CO2 25 20 21   GLUCOSE 104* 111* 119*   BUN 24* 24* 26*   CREATININE 1.65* 1.49* 1.40   CALCIUM 8.5 8.3* 8.4*                   Imaging  US-RENAL   Final Result      1.  Low-level echogenic debris in the urinary bladder is noted which could indicate UTI or blood within the bladder.      2.  No hydronephrosis.      IR-MIDLINE CATHETER INSERTION WO GUIDANCE > AGE 3   Final Result                  Ultrasound-guided midline placement performed by qualified nursing staff    as above.          EC-ECHOCARDIOGRAM COMPLETE W/ CONT   Final Result      DX-CHEST-PORTABLE (1 VIEW)   Final Result      1.  Minor asymmetric interstitial opacity in the right lower lobe which could represent minor interstitial edema, pneumonia, or fibrotic change.      2.  No left lung consolidation.      3.  No evidence of congestive failure.       [id number]-1 VIEW   Final Result         No specific finding to suggest small bowel obstruction.      [id number]-7 VIEW   Final Result         No significant interval change.      DX-CHEST-LIMITED (1 VIEW)   Final Result         Diffuse interstitial prominence could relate to mild pulmonary edema or atypical infection      [id number]-5 VIEW   Final Result      Increased colonic gas without definite bowel obstruction.      US-RENAL   Final Result      No evidence of hydronephrosis.      Lobulated kidneys bilaterally.      CT-ABDOMEN-PELVIS WITH   Final Result      1.  There is no evidence of small bowel obstruction.   2.  There is a left lower quadrant ostomy.   3.  There is fluid distention of the colon distal to the surgical material in the left mid descending colon extending all the way to the rectum. There is no pneumatosis or free air.   4.  There is cholelithiasis without biliary dilatation.   5.  There has been interval removal of the drainage catheter anterior to the spleen with minimal hypodense area in the anterior spleen again noted. There is no new fluid collection.      IR-US GUIDED PIV   Final Result    Ultrasound-guided PERIPHERAL IV INSERTION performed by    qualified nursing staff as above.      EC-ECHOCARDIOGRAM COMPLETE W/O CONT   Final Result      DX-LUMBAR SPINE-2 OR 3 VIEWS   Final Result      Moderate compression deformity of T11 is new compared to 2018.      Mild wedge deformity of T12 is unchanged.      Degenerative changes including facet arthropathy.      Mild retrolisthesis of L5 on S1 and L3 on L4.              Assessment/Plan  Urinary tract infection- (present on admission)  Assessment & Plan  Completed 10-day course of Bactrim    Gram-negative bacteremia- (present on admission)  Assessment & Plan  Completed 10-day course of Bactrim    Major depressive disorder, recurrent, severe with psychotic features (HCC)- (present on admission)  Assessment & Plan  Geodon, Paxil,  Klonopin    Colostomy present (HCC)- (present on admission)  Assessment & Plan  Exploratory laparotomy with colostomy takedown and closure 5/27/2021  Segmental colectomy, colostomy creation, mobilization of splenic flexure, wound VAC placement, irrigation and debridement of flank wound on 11/10/2020.  Pain control  Wound care        Suicidal ideation- (present on admission)  Assessment & Plan  Legal hold discontinued 6/4/2021    Normocytic anemia- (present on admission)  Assessment & Plan  Follow cbc    Leukocytosis- (present on admission)  Assessment & Plan  Follow cbc    Obstructive uropathy- (present on admission)  Assessment & Plan  resolved    Permanent atrial fibrillation (HCC)- (present on admission)  Assessment & Plan  Metoprolol, Eliquis    Chronic heart failure with preserved ejection fraction (HCC)- (present on admission)  Assessment & Plan  Metoprolol, Lasix  Fluid restriction of 1.5 L per day      Type 2 diabetes mellitus with hyperglycemia, with long-term current use of insulin (HCC)- (present on admission)  Assessment & Plan  Follow bmp    Hypothyroidism- (present on admission)  Assessment & Plan  Increased Levothyroxine to 125 mcg  Check TSH on 6/29/2021    SRUTHI (acute kidney injury) (HCC)- (present on admission)  Assessment & Plan  Follow bmp    Adrenal insufficiency (HCC)- (present on admission)  Assessment & Plan  Cortef 5 mg BID  Plan to decrease to daily on 7/4/2021    Debility- (present on admission)  Assessment & Plan  PT and OT    Orthostatic hypotension- (present on admission)  Assessment & Plan  off Florinef.    Hypomagnesemia- (present on admission)  Assessment & Plan  IV Mg 2 g  Follow level    Hypokalemia- (present on admission)  Assessment & Plan  Increase Kdur  Follow bmp, Ionized Ca, Ph    Chronic venous insufficiency of lower extremity- (present on admission)  Assessment & Plan  compression stockings.    Mixed hyperlipidemia- (present on admission)  Assessment & Plan  Atorvastatin.      Lower limb amputation, great toe (HCC)- (present on admission)  Assessment & Plan  Monitor  No signs of infection     Class 2 severe obesity with serious comorbidity and body mass index (BMI) of 35.0 to 35.9 in adult (HCC)- (present on admission)  Assessment & Plan  Body mass index is 35.19 kg/m².       VTE prophylaxis: Lovenox

## 2021-06-27 NOTE — PROGRESS NOTES
Michael Score 14  2 Skin check    2 RN skin check complete.     Devices in place: SCD's bilaterally, SpO2 finger probe, PIV line.   Skin assessed under devices     Surgical Incision - Wound vac in place, patent.    Wound care following patient.     -Skin beneath folds checked.   -Generalized fragility.  -Small bruising around MLI.  -LLQ transverse incision, CHARLEY no drainage.  -Scattered abrasions to RLE, no drainage.  -Skin tear to L buttocks, no drainage & CHARLEY.  -excoriation beneath pannus on R side, photo taken.  -Perineum & sacrum both red and blanching, barrier cream applied.  -R toe missing from previous amputation, CDI  - All bony prominences assessed. Skin otherwise intact.     Preventative measures in place:  - turns with use of pillows to support repositioning  -heels floated on pillows    -bony prominences floated on pillows  -Q2 turns  -Cue for turning while in bed  -bilateral heel and elbow mepilex  -frequent incontinence checks  -repositioning of devices   -Offered tooth brushing  -Waffle Mattress overlay in place

## 2021-06-27 NOTE — PROGRESS NOTES
Assumed care of patient at 0645. Bedside report received. Assessment complete.  AA&Ox4. Denies CP/SOB.  Reporting 8/10 pain. Medicated per MAR.   Educated patient regarding pharmacologic and non pharmacologic modalities for pain management.  Wound Vac in place, CDI/ Skin tear wound noted on right pannus and left buttock  Tolerating low fiber diet. Denies N/V.  + void. + BM. Last BM 6/27  Pt ambulates with max assist.  All needs met at this time. Call light within reach. Pt calls appropriately. Bed low and locked, non skid socks in place. Hourly rounding in place.

## 2021-06-27 NOTE — PROGRESS NOTES
Pt is A&O 4  Pain medicated per MAR   - nausea  Tolerating a low fiber diet   Wound vac to the abdomen  Generalized edema   BM 6/26  Up max assist  SCD's refused  Bed alarm on, pt high fall risk per juli chávez  Reviewed plan of care with patient, bed in lowest position and locked, pt resting comfortably now, call light within reach, all needs met at this time. Interventions will be executed per plan of care

## 2021-06-28 ENCOUNTER — APPOINTMENT (OUTPATIENT)
Dept: RADIOLOGY | Facility: MEDICAL CENTER | Age: 56
DRG: 981 | End: 2021-06-28
Attending: FAMILY MEDICINE
Payer: MEDICAID

## 2021-06-28 PROBLEM — R19.7 DIARRHEA: Status: ACTIVE | Noted: 2021-06-28

## 2021-06-28 LAB
ANION GAP SERPL CALC-SCNC: 11 MMOL/L (ref 7–16)
BASOPHILS # BLD AUTO: 0.5 % (ref 0–1.8)
BASOPHILS # BLD: 0.05 K/UL (ref 0–0.12)
BUN SERPL-MCNC: 23 MG/DL (ref 8–22)
CA-I SERPL-SCNC: 1.2 MMOL/L (ref 1.1–1.3)
CALCIUM SERPL-MCNC: 8.4 MG/DL (ref 8.5–10.5)
CHLORIDE SERPL-SCNC: 106 MMOL/L (ref 96–112)
CO2 SERPL-SCNC: 21 MMOL/L (ref 20–33)
CREAT SERPL-MCNC: 1.24 MG/DL (ref 0.5–1.4)
EOSINOPHIL # BLD AUTO: 0.5 K/UL (ref 0–0.51)
EOSINOPHIL NFR BLD: 4.9 % (ref 0–6.9)
ERYTHROCYTE [DISTWIDTH] IN BLOOD BY AUTOMATED COUNT: 53.8 FL (ref 35.9–50)
GLUCOSE SERPL-MCNC: 109 MG/DL (ref 65–99)
HCT VFR BLD AUTO: 33.6 % (ref 42–52)
HGB BLD-MCNC: 10.4 G/DL (ref 14–18)
IMM GRANULOCYTES # BLD AUTO: 0.04 K/UL (ref 0–0.11)
IMM GRANULOCYTES NFR BLD AUTO: 0.4 % (ref 0–0.9)
LYMPHOCYTES # BLD AUTO: 2.59 K/UL (ref 1–4.8)
LYMPHOCYTES NFR BLD: 25.3 % (ref 22–41)
MAGNESIUM SERPL-MCNC: 1.8 MG/DL (ref 1.5–2.5)
MCH RBC QN AUTO: 28.3 PG (ref 27–33)
MCHC RBC AUTO-ENTMCNC: 31 G/DL (ref 33.7–35.3)
MCV RBC AUTO: 91.6 FL (ref 81.4–97.8)
MONOCYTES # BLD AUTO: 0.8 K/UL (ref 0–0.85)
MONOCYTES NFR BLD AUTO: 7.8 % (ref 0–13.4)
NEUTROPHILS # BLD AUTO: 6.25 K/UL (ref 1.82–7.42)
NEUTROPHILS NFR BLD: 61.1 % (ref 44–72)
NRBC # BLD AUTO: 0 K/UL
NRBC BLD-RTO: 0 /100 WBC
PHOSPHATE SERPL-MCNC: 3.5 MG/DL (ref 2.5–4.5)
PLATELET # BLD AUTO: 214 K/UL (ref 164–446)
PMV BLD AUTO: 12 FL (ref 9–12.9)
POTASSIUM SERPL-SCNC: 3.3 MMOL/L (ref 3.6–5.5)
RBC # BLD AUTO: 3.67 M/UL (ref 4.7–6.1)
SODIUM SERPL-SCNC: 138 MMOL/L (ref 135–145)
WBC # BLD AUTO: 10.2 K/UL (ref 4.8–10.8)

## 2021-06-28 PROCEDURE — 700102 HCHG RX REV CODE 250 W/ 637 OVERRIDE(OP): Performed by: INTERNAL MEDICINE

## 2021-06-28 PROCEDURE — 80048 BASIC METABOLIC PNL TOTAL CA: CPT

## 2021-06-28 PROCEDURE — 84100 ASSAY OF PHOSPHORUS: CPT

## 2021-06-28 PROCEDURE — A9270 NON-COVERED ITEM OR SERVICE: HCPCS | Performed by: INTERNAL MEDICINE

## 2021-06-28 PROCEDURE — 700102 HCHG RX REV CODE 250 W/ 637 OVERRIDE(OP): Performed by: FAMILY MEDICINE

## 2021-06-28 PROCEDURE — 97606 NEG PRS WND THER DME>50 SQCM: CPT

## 2021-06-28 PROCEDURE — 83630 LACTOFERRIN FECAL (QUAL): CPT

## 2021-06-28 PROCEDURE — 700111 HCHG RX REV CODE 636 W/ 250 OVERRIDE (IP): Performed by: FAMILY MEDICINE

## 2021-06-28 PROCEDURE — 770001 HCHG ROOM/CARE - MED/SURG/GYN PRIV*

## 2021-06-28 PROCEDURE — 85025 COMPLETE CBC W/AUTO DIFF WBC: CPT

## 2021-06-28 PROCEDURE — 82330 ASSAY OF CALCIUM: CPT

## 2021-06-28 PROCEDURE — 36415 COLL VENOUS BLD VENIPUNCTURE: CPT

## 2021-06-28 PROCEDURE — 302098 PASTE RING (FLAT): Performed by: FAMILY MEDICINE

## 2021-06-28 PROCEDURE — 74176 CT ABD & PELVIS W/O CONTRAST: CPT

## 2021-06-28 PROCEDURE — A9270 NON-COVERED ITEM OR SERVICE: HCPCS | Performed by: FAMILY MEDICINE

## 2021-06-28 PROCEDURE — 99233 SBSQ HOSP IP/OBS HIGH 50: CPT | Performed by: FAMILY MEDICINE

## 2021-06-28 PROCEDURE — 83735 ASSAY OF MAGNESIUM: CPT

## 2021-06-28 RX ORDER — MAGNESIUM SULFATE HEPTAHYDRATE 40 MG/ML
2 INJECTION, SOLUTION INTRAVENOUS ONCE
Status: COMPLETED | OUTPATIENT
Start: 2021-06-28 | End: 2021-06-28

## 2021-06-28 RX ADMIN — AMIODARONE HYDROCHLORIDE 200 MG: 200 TABLET ORAL at 04:42

## 2021-06-28 RX ADMIN — OMEPRAZOLE 40 MG: 20 CAPSULE, DELAYED RELEASE ORAL at 16:14

## 2021-06-28 RX ADMIN — POTASSIUM CHLORIDE 40 MEQ: 1500 TABLET, EXTENDED RELEASE ORAL at 16:15

## 2021-06-28 RX ADMIN — TAMSULOSIN HYDROCHLORIDE 0.4 MG: 0.4 CAPSULE ORAL at 08:58

## 2021-06-28 RX ADMIN — LEVOTHYROXINE SODIUM 125 MCG: 0.12 TABLET ORAL at 04:44

## 2021-06-28 RX ADMIN — ZIPRASIDONE HYDROCHLORIDE 60 MG: 60 CAPSULE ORAL at 08:58

## 2021-06-28 RX ADMIN — Medication 1000 UNITS: at 04:41

## 2021-06-28 RX ADMIN — APIXABAN 5 MG: 5 TABLET, FILM COATED ORAL at 16:15

## 2021-06-28 RX ADMIN — OXYCODONE 10 MG: 5 TABLET ORAL at 22:27

## 2021-06-28 RX ADMIN — METOPROLOL TARTRATE 25 MG: 25 TABLET, FILM COATED ORAL at 04:41

## 2021-06-28 RX ADMIN — MAGNESIUM SULFATE 2 G: 2 INJECTION INTRAVENOUS at 08:58

## 2021-06-28 RX ADMIN — HYDROCORTISONE 5 MG: 5 TABLET ORAL at 04:43

## 2021-06-28 RX ADMIN — ATORVASTATIN CALCIUM 40 MG: 40 TABLET, FILM COATED ORAL at 16:15

## 2021-06-28 RX ADMIN — METOPROLOL TARTRATE 25 MG: 25 TABLET, FILM COATED ORAL at 16:15

## 2021-06-28 RX ADMIN — ACETAMINOPHEN 650 MG: 325 TABLET, FILM COATED ORAL at 16:16

## 2021-06-28 RX ADMIN — APIXABAN 5 MG: 5 TABLET, FILM COATED ORAL at 04:42

## 2021-06-28 RX ADMIN — OXYCODONE 10 MG: 5 TABLET ORAL at 18:52

## 2021-06-28 RX ADMIN — OXYCODONE 10 MG: 5 TABLET ORAL at 09:02

## 2021-06-28 RX ADMIN — FUROSEMIDE 20 MG: 20 TABLET ORAL at 04:44

## 2021-06-28 RX ADMIN — POTASSIUM CHLORIDE 40 MEQ: 1500 TABLET, EXTENDED RELEASE ORAL at 04:41

## 2021-06-28 RX ADMIN — AMIODARONE HYDROCHLORIDE 200 MG: 200 TABLET ORAL at 16:16

## 2021-06-28 RX ADMIN — ZIPRASIDONE HYDROCHLORIDE 60 MG: 60 CAPSULE ORAL at 20:55

## 2021-06-28 RX ADMIN — OXYCODONE 10 MG: 5 TABLET ORAL at 15:05

## 2021-06-28 RX ADMIN — CLONAZEPAM 1 MG: 1 TABLET ORAL at 16:15

## 2021-06-28 RX ADMIN — PAROXETINE HYDROCHLORIDE 20 MG: 20 TABLET, FILM COATED ORAL at 08:58

## 2021-06-28 RX ADMIN — CLONAZEPAM 0.5 MG: 0.5 TABLET ORAL at 08:58

## 2021-06-28 RX ADMIN — OMEPRAZOLE 40 MG: 20 CAPSULE, DELAYED RELEASE ORAL at 04:41

## 2021-06-28 RX ADMIN — HYDROCORTISONE 5 MG: 5 TABLET ORAL at 15:05

## 2021-06-28 ASSESSMENT — PAIN DESCRIPTION - PAIN TYPE
TYPE: ACUTE PAIN

## 2021-06-28 ASSESSMENT — ENCOUNTER SYMPTOMS
NECK PAIN: 0
HEARTBURN: 0
SPEECH CHANGE: 0
SENSORY CHANGE: 0
SHORTNESS OF BREATH: 0
ABDOMINAL PAIN: 1
WHEEZING: 0
WEAKNESS: 1
FLANK PAIN: 0
PALPITATIONS: 0
DIZZINESS: 0
BLURRED VISION: 0
CHILLS: 0
DEPRESSION: 0
HEADACHES: 0
HALLUCINATIONS: 0
COUGH: 0
BACK PAIN: 0
FEVER: 0
NAUSEA: 0
BLOOD IN STOOL: 0
VOMITING: 0
SORE THROAT: 0
MYALGIAS: 0
FOCAL WEAKNESS: 0
NERVOUS/ANXIOUS: 0
DIAPHORESIS: 0
DIARRHEA: 1

## 2021-06-28 ASSESSMENT — PAIN SCALES - WONG BAKER: WONGBAKER_NUMERICALRESPONSE: DOESN'T HURT AT ALL

## 2021-06-28 NOTE — PROGRESS NOTES
[facility] Medicine Daily Progress Note    Date of Service  6/28/2021    Chief Complaint  56 y.o. male admitted 2/9/2021 with Syncope.    Hospital Course  Admitted with syncope, he was noted to be bradycardic and hypotensive.  He has known history of permanent atrial fibrillation and he was on diltiazem, digoxin and metoprolol.  These medications were held.  Later on he was restarted on low-dose Toprol and Digoxin.  He was also started on anticoagulation with Eliquis.  He also had a possible history of adrenal insufficiency, he was already on Florinef and midodrine.  Midodrine was discontinued and he was started on cortisol. Further work-up showed that he had gram-negative bacteremia as well as urinary tract infection and acute kidney injury.  He was initially placed on IV Zosyn, later changed to oral Bactrim to finish the course.  He was carefully hydrated with IV fluids.  On admission he also expressed suicidal ideation.  Psychiatry was consulted the case, he was placed on a legal hold.  He was noted to have severe depression.  He has been placed on medication the form of Geodon, Paxil, Klonopin.  He has a colostomy in place, which the patient believes contributes to his severe depression because of ostomy care.  He had history of bowel perforation and splenic fluid collection requiring Segmental colectomy, colostomy creation, mobilization of splenic flexure, wound VAC placement, irrigation and debridement of flank wound on 11/10/2020.  Surgery was consulted on the case, and he underwent exploratory laparotomy and colostomy takedown and closure on 5/27/2021.  Wound VAC was later placed.  Psychiatry continued to follow the patient, his legal hold was discontinued on 6/4/2021    Interval Problem Update  Colostomy - pain controlled  Afib - rate 60-80  SRUTHI - crea 1.2  Diarrhea -started yesterday, C. difficile negative  Low magnesium, potassium    Consultants/Specialty  Cardiology  Psychiatry  Surgery    Code  Status  Full Code    Disposition  SNF     Review of Systems  Review of Systems   Constitutional: Negative for chills, diaphoresis, fever and malaise/fatigue.   HENT: Negative for congestion, hearing loss and sore throat.    Eyes: Negative for blurred vision.   Respiratory: Negative for cough, shortness of breath and wheezing.    Cardiovascular: Negative for chest pain, palpitations and leg swelling.   Gastrointestinal: Positive for abdominal pain and diarrhea. Negative for blood in stool, heartburn, melena, nausea and vomiting.   Genitourinary: Negative for dysuria, flank pain and hematuria.   Musculoskeletal: Negative for back pain, joint pain, myalgias and neck pain.   Skin: Negative for rash.   Neurological: Positive for weakness. Negative for dizziness, sensory change, speech change, focal weakness and headaches.   Psychiatric/Behavioral: Negative for depression, hallucinations and suicidal ideas. The patient is not nervous/anxious.       Physical Exam  Temp:  [36.2 °C (97.1 °F)-36.5 °C (97.7 °F)] 36.2 °C (97.1 °F)  Pulse:  [64-88] 64  Resp:  [16-17] 17  BP: (102-119)/(63-80) 105/67  SpO2:  [95 %-99 %] 95 %    Physical Exam  Vitals and nursing note reviewed.   Constitutional:       Appearance: He is obese.   HENT:      Head: Normocephalic and atraumatic.      Nose: No congestion.      Mouth/Throat:      Mouth: Mucous membranes are moist.   Eyes:      Extraocular Movements: Extraocular movements intact.      Conjunctiva/sclera: Conjunctivae normal.      Pupils: Pupils are equal, round, and reactive to light.   Cardiovascular:      Rate and Rhythm: Normal rate. Rhythm irregularly irregular.   Pulmonary:      Effort: Pulmonary effort is normal.      Breath sounds: Normal breath sounds.   Abdominal:      General: There is distension.      Tenderness: There is abdominal tenderness. There is no guarding or rebound.      Comments: Wound vac   Musculoskeletal:      Cervical back: No muscular tenderness.      Right lower  leg: Edema present.      Left lower leg: Edema present.   Skin:     General: Skin is warm and dry.   Neurological:      General: No focal deficit present.      Mental Status: He is alert and oriented to person, place, and time.      Cranial Nerves: No cranial nerve deficit.   Psychiatric:         Mood and Affect: Mood is not anxious or depressed.         Thought Content: Thought content is not paranoid or delusional. Thought content does not include homicidal or suicidal ideation. Thought content does not include homicidal or suicidal plan.       Fluids    Intake/Output Summary (Last 24 hours) at 6/28/2021 1127  Last data filed at 6/28/2021 0950  Gross per 24 hour   Intake 1296.25 ml   Output 800 ml   Net 496.25 ml       Laboratory  Recent Labs     06/26/21  0433 06/27/21  0422 06/28/21  0506   WBC 10.8 10.4 10.2   RBC 3.53* 3.56* 3.67*   HEMOGLOBIN 10.0* 10.3* 10.4*   HEMATOCRIT 32.9* 32.4* 33.6*   MCV 93.2 91.0 91.6   MCH 28.3 28.9 28.3   MCHC 30.4* 31.8* 31.0*   RDW 53.9* 53.0* 53.8*   PLATELETCT 192 191 214   MPV 11.9 11.4 12.0     Recent Labs     06/26/21  0433 06/27/21  0422 06/28/21  0506   SODIUM 136 136 138   POTASSIUM 3.7 3.3* 3.3*   CHLORIDE 107 107 106   CO2 20 21 21   GLUCOSE 111* 119* 109*   BUN 24* 26* 23*   CREATININE 1.49* 1.40 1.24   CALCIUM 8.3* 8.4* 8.4*                   Imaging  US-RENAL   Final Result      1.  Low-level echogenic debris in the urinary bladder is noted which could indicate UTI or blood within the bladder.      2.  No hydronephrosis.      IR-MIDLINE CATHETER INSERTION WO GUIDANCE > AGE 3   Final Result                  Ultrasound-guided midline placement performed by qualified nursing staff    as above.          EC-ECHOCARDIOGRAM COMPLETE W/ CONT   Final Result      DX-CHEST-PORTABLE (1 VIEW)   Final Result      1.  Minor asymmetric interstitial opacity in the right lower lobe which could represent minor interstitial edema, pneumonia, or fibrotic change.      2.  No left lung  consolidation.      3.  No evidence of congestive failure.      VA-NLDCGKS-1 VIEW   Final Result         No specific finding to suggest small bowel obstruction.      DX-HGCETKH-6 VIEW   Final Result         No significant interval change.      DX-CHEST-LIMITED (1 VIEW)   Final Result         Diffuse interstitial prominence could relate to mild pulmonary edema or atypical infection      MC-ZSHCSZG-5 VIEW   Final Result      Increased colonic gas without definite bowel obstruction.      US-RENAL   Final Result      No evidence of hydronephrosis.      Lobulated kidneys bilaterally.      CT-ABDOMEN-PELVIS WITH   Final Result      1.  There is no evidence of small bowel obstruction.   2.  There is a left lower quadrant ostomy.   3.  There is fluid distention of the colon distal to the surgical material in the left mid descending colon extending all the way to the rectum. There is no pneumatosis or free air.   4.  There is cholelithiasis without biliary dilatation.   5.  There has been interval removal of the drainage catheter anterior to the spleen with minimal hypodense area in the anterior spleen again noted. There is no new fluid collection.      IR-US GUIDED PIV   Final Result    Ultrasound-guided PERIPHERAL IV INSERTION performed by    qualified nursing staff as above.      EC-ECHOCARDIOGRAM COMPLETE W/O CONT   Final Result      DX-LUMBAR SPINE-2 OR 3 VIEWS   Final Result      Moderate compression deformity of T11 is new compared to 2018.      Mild wedge deformity of T12 is unchanged.      Degenerative changes including facet arthropathy.      Mild retrolisthesis of L5 on S1 and L3 on L4.         CT-ABDOMEN-PELVIS W/O    (Results Pending)        Assessment/Plan  Urinary tract infection- (present on admission)  Assessment & Plan  Completed 10-day course of Bactrim    Gram-negative bacteremia- (present on admission)  Assessment & Plan  Completed 10-day course of Bactrim    Major depressive disorder, recurrent, severe  with psychotic features (HCC)- (present on admission)  Assessment & Plan  Geodon, Paxil, Klonopin    Colostomy present (HCC)- (present on admission)  Assessment & Plan  Exploratory laparotomy with colostomy takedown and closure 5/27/2021  Segmental colectomy, colostomy creation, mobilization of splenic flexure, wound VAC placement, irrigation and debridement of flank wound on 11/10/2020.  Pain control  Wound care        Suicidal ideation- (present on admission)  Assessment & Plan  Legal hold discontinued 6/4/2021    Diarrhea- (present on admission)  Assessment & Plan  C diff negative  Check stool wbc and culture  Check CT abd/pelvis    Normocytic anemia- (present on admission)  Assessment & Plan  Follow cbc    Leukocytosis- (present on admission)  Assessment & Plan  Follow cbc    Obstructive uropathy- (present on admission)  Assessment & Plan  resolved    Permanent atrial fibrillation (HCC)- (present on admission)  Assessment & Plan  Metoprolol, Eliquis    Chronic heart failure with preserved ejection fraction (HCC)- (present on admission)  Assessment & Plan  Metoprolol, Lasix  Fluid restriction of 1.5 L per day      Type 2 diabetes mellitus with hyperglycemia, with long-term current use of insulin (HCC)- (present on admission)  Assessment & Plan  Follow bmp    Hypothyroidism- (present on admission)  Assessment & Plan  Increased Levothyroxine to 125 mcg  Check TSH on 6/29/2021    SRUTHI (acute kidney injury) (HCC)- (present on admission)  Assessment & Plan  Follow bmp    Adrenal insufficiency (HCC)- (present on admission)  Assessment & Plan  Cortef 5 mg BID  Plan to decrease to daily on 7/4/2021    Debility- (present on admission)  Assessment & Plan  PT and OT    Orthostatic hypotension- (present on admission)  Assessment & Plan  off Florinef.    Hypomagnesemia- (present on admission)  Assessment & Plan  IV Mg 2 g  Follow level    Hypokalemia- (present on admission)  Assessment & Plan  Kdur  Follow bmp    Chronic  venous insufficiency of lower extremity- (present on admission)  Assessment & Plan  compression stockings.    Mixed hyperlipidemia- (present on admission)  Assessment & Plan  Atorvastatin.     Lower limb amputation, great toe (HCC)- (present on admission)  Assessment & Plan  Monitor  No signs of infection     Class 2 severe obesity with serious comorbidity and body mass index (BMI) of 35.0 to 35.9 in adult (HCC)- (present on admission)  Assessment & Plan  Body mass index is 35.19 kg/m².       VTE prophylaxis: Lovenox

## 2021-06-28 NOTE — PROGRESS NOTES
Pt is A+Ox4  Complained of 7/10 pain, medicated per MAR   VSS, room air   Incontinent of bowel, LBM 06/27  +void   Tolerating a low fiber GI soft diet    1500mL fluid restriction    Denies N/V. +diarrhea   Max assist. Pt is very weak, Q2 turns in place   See skin note for assessment    Wound vac, SCDs, skin breakdown, pannus redness, BLE edema   Pt denied thoughts of hurting self or others   Pt updated on POC, approriate fall precautions in place, bed alarm on, all needs met at this time   Hourly rounding

## 2021-06-28 NOTE — WOUND TEAM
[facility]  Inpatient Services  Wound and Skin Care Progress Note    Admission Date: 2/9/2021     Last order of IP CONSULT TO WOUND CARE was found on 3/18/2021 from Hospital Encounter on 2/9/2021     HPI, PMH, SH: Reviewed    Past Surgical History:   Procedure Laterality Date   • PB EXPLORATORY OF ABDOMEN  5/27/2021    Procedure: LAPAROTOMY, EXPLORATORY;  Surgeon: Kin Desouza D.O.;  Location: [facility];  Service: General   • COLOSTOMY TAKEDOWN  5/27/2021    Procedure: COLOSTOMY TAKEDOWN AND CLOSURE;  Surgeon: Kin Desouza D.O.;  Location: [facility];  Service: General   • COLECTOMY N/A 11/10/2020    Procedure: COLECTOMY-SEGMENTAL, COLOSTOMY, MOBILIZATION OF SPLENIC FLEXURE, WOUND VAC PLACEMENT, IRRIGATION AND DEBRIDMENT FLANK WOUND;  Surgeon: Kin Desouza D.O.;  Location: [facility];  Service: General   • ZZZ CARDIAC CATH  07/21/2018    EF 45%, normal coronaries.   • IRRIGATION & DEBRIDEMENT ORTHO Right 2/19/2018    Procedure: IRRIGATION & DEBRIDEMENT ORTHO-FOOT;  Surgeon: Kirby Lopez M.D.;  Location: [facility];  Service: Orthopedics   • TOE AMPUTATION Right 1/17/2018    Procedure: TOE AMPUTATION-1ST RAY;  Surgeon: Doug Delong M.D.;  Location: [facility];  Service: Orthopedics   • TOE AMPUTATION Right 11/10/2017    Procedure: TOE AMPUTATION;  Surgeon: Osorio Leo M.D.;  Location: [facility];  Service: Orthopedics     Social History     Tobacco Use   • Smoking status: Never Smoker   • Smokeless tobacco: Never Used   Substance Use Topics   • Alcohol use: No     Chief Complaint   Patient presents with   • Syncope     X1 this morning when standing up to go to bathroom     Diagnosis: Syncope and collapse [R55]  Suicidal ideation [R45.851]  Sepsis due to Klebsiella pneumoniae (HCC) [A41.4]    Unit where seen by Wound Team: T418/00     WOUND CONSULT/FOLLOW UP RELATED TO: abdominal wound    WOUND HISTORY:  Pt is an older  gentleman well known to [facility] wound team 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 Pt currently admitted since 2/9/21 related to SI concerns due to body image issues related to colostomy.      *Pt underwent surgical reversal of colostomy with Dr. Desouza on 5/20/21. MLI was re-opened at that time and was then closed with interrupted staples and packing. Unfortunately MLI continued to Dehisce and therefore wound team was consulted to further manage MLI with dakins packing.     6/3 Wound vac placed    WOUND ASSESSMENT/LDA      Negative Pressure Wound Therapy 06/03/21 Abdomen (Active)   Vacuum Serial Number [id number] 06/26/21 2054   NPWT Pump Mode / Pressure Setting Ulta;Continuous;125 mmHg    Dressing Type Black Foam (Regular);Medium    Number of Foam Pieces Used 2    Canister Changed No    Output (mL) 0 mL    NEXT Dressing Change/Treatment Date 06/28/21    WOUND NURSE ONLY - Time Spent with Patient (mins) 45      Wound 05/27/21 Full Thickness Wound Abdomen (Active)   Wound Image   06/25/21 1330   Site Assessment Red;Granulation tissue    Periwound Assessment Clean;Dry;Intact    Margins Attached edges;Defined edges    Closure Adhesive bandage;Secondary intention    Drainage Amount Small    Drainage Description Serosanguineous    Treatments Cleansed;Site care    Wound Cleansing Approved Wound Cleanser    Periwound Protectant Skin Protectant Wipes to Periwound;Hydrofiber;Drape;Paste Ring    Dressing Cleansing/Solutions Not Applicable    Dressing Options Wound Vac    Dressing Changed Changed    Dressing Status Clean;Dry;Intact    Dressing Change/Treatment Frequency Monday, Wednesday, Friday, and As Needed    NEXT Dressing Change/Treatment Date 06/30/21    NEXT Weekly Photo (Inpatient Only) 07/02/21    Non-staged Wound Description Full thickness    Wound Length (cm) 17 cm    Wound Width (cm) 6.9 cm    Wound Depth (cm) 0.8 cm    Wound Surface Area (cm^2) 117.3 cm^2    Wound Volume (cm^3) 93.84 cm^3     Wound Healing % -15    Tunneling (cm) 1 cm    Tunneling Clock Position of Wound 12    Tunneling - 2nd Location (cm) 0.7 cm    Tunneling Clock Position of Wound - 2nd Location 6    Undermining (cm) 1.2 cm    Undermining of Wound, 1st Location From 1 o'clock;To 2 o'clock    Shape Linear    Wound Odor None    Pulses N/A    Exposed Structures Sutures;Fascia    WOUND NURSE ONLY - Time Spent with Patient (mins) 60      Vascular:    SHAYAN:   No results found.    Lab Values:    Lab Results   Component Value Date/Time    WBC 10.2 2021 05:06 AM    RBC 3.67 (L) 2021 05:06 AM    HEMOGLOBIN 10.4 (L) 2021 05:06 AM    HEMATOCRIT 33.6 (L) 2021 05:06 AM    CREACTPROT 0.48 06/10/2021 04:05 AM    SEDRATEWES 13 10/23/2018 08:02 AM    HBA1C 5.2 2021 01:30 AM      Culture Results show:  No results found for this or any previous visit (from the past 720 hour(s)).    Pain Level/Medicated:  Tolerated well without pain medication.      INTERVENTIONS BY WOUND TEAM:  Chart and images reviewed. Discussed with bedside RN. All areas of concern (based on picture review, LDA review and discussion with bedside RN) have been thoroughly assessed. Documentation of areas based on significant findings. This RN in to assess patient. Performed standard wound care which includes appropriate positioning, dressing removal and non-selective debridement. Pictures and measurements obtained weekly if/when required.              ABD:   Preparation for Dressing removal:  Removed without difficulty.    Cleansed with: Wound cleanser and gauze   Sharp debridement: 7 staples from proximal end   Vj wound: Cleansed with Wound cleanser and gauze, No sting skin and ostomy powder and paste ring x 2.5, aquacel ag to distal and proximal vj-wound.    Primary Dressin piece of full thickness spiraled black foam to wound bed. Edges pulled together and secured with drape. A 2nd piece of circular half thickness applied as button for trac pad.  "  Secondary (Outer) Dressing:  Sealed with drape, and TRAC pad applied.      Pt seen for placement of BMS. MD order verified. Pt assessed, noted to be incontinent of loose brown stool. Sacrum/buttocks pink and intact. Sphincter tone determined to be adequate. BMS placed without difficulty, 45 mL of tap water placed in retention balloon, irrigated with 60 mL warm tap water. Nursing to irrigate q shift with 60 mL-120 mL warm tap water or until patent.       Interdisciplinary consultation: Patient, Bedside RN (Kareen), wound (Delvis)     EVALUATION / RATIONALE FOR TREATMENT:  Most Recent Date:  6/28/21: Wound continues to have granular tissue. Pt heading to CT scan today. Wound has decreased in width significantly since dc of veraflo. Continued with NPWT.    06/25/21: wound continues to have granulation tissue.  Left abd with some protuding, Dr. natarajan updated and assessed.     6/23/21: wound continues to progress, transitioned to regular vac and continued to pull edges together.   6/21/21: Edges pulled together with jones by Dr. Rooney during change on Friday. Discussed with Lyly today. MD unable to be bedside today. Will continue with veraflo hopefully will be able to transition to regular vac at next visit to further assist with edges being pulled together. Meet with Dr. Rooney on Wednesday at 8am to assess wound.  6/18/21: continuing to decrease in slough in wound bed. One suture towards proximal end visible, per MD rooney, leave in place but monitor. Staples added to superior and inferior ends to assist with closure by MD. Continuing to pull edges together, MD will reassess on Monday with wound team to monitor for improvement and decide if surgical I&D is needed or if appropriate to switch to regular NPWT dressing instead of VF.   6/16/21: Wound with decreased slough. Wound appears to be \"flattening out.\" Veraflo is assisting with cleansing wound but is making pulling edges together difficult. Discussed " with Dr. Desouza. Will plan to Meet with Dr. Desouza on Friday at 8am to assess wound, Dr. Desouza to pull superior and inferior borders together and staple to assist with closure.  6/14/21: Wound continues to granulate. Small amount of slough over fascial line. Discussed with Dr. Desouza. Plan to continue with veraflo, settings decreased. Will evaluate the wound with Dr. Desouza on Wednesday at 9am. Per MD may consider OR washout with staples to distal proximal aspect to assist in wound closure vs. Adding staples at bedside.   06/11/21:  Wound looks good, minimal slough mainly to fascial line sutures.  VF grey foam layer to assist in debridement of slough.  Dr. Desouza to re-assess Monday and do surgical debridement if needed next week.   06/09/21:  Wound continues to progress, pull edges together to lessen width of wound. Dr. Desouza would like to see wound on Friday 6/7: Pt's wound appears to be improving per measurements and pictures. There is granulation tissue present.   06/5/21: Wound bed improving with increased granulation tissue.  Wound edges approximated prior to drape application.  NPWT continued.    06/03/21:  Fascial sutures intact, wound VAC placed with DR. Desouza.    6/1/21: Wound appears  than yesterdays pictures. Discussed with Dr. Desouza, plan to continue dakins TID for 48hrs, CT scan ordered today. Will re-evaluate on Thursday for potential vac placement.   5/31/21: Wound initially with interrupted staples and packing now with dehiscence. Per Dr. Baeza request wound team initiated dakins packing. Pt may benefit from NPWT to assist with closure.   5/8/21: wound nearly healed. Does not require advanced wound care. Wound team signing off and nursing to re consult if site worsens. Continue with dressing care per order.  4/21/21: abd wound improving. Nursing to continue dsg changes per order.  4/15: Right posterior thigh DTI now resolved. Pink and blanching. Abdominal wound to surface level  continuing with dressing POC.  4/7/21: Thigh wound nearly healed and will likely be resolved at next assessment. Abdomen wound appears larger. Continued with Arti to move through inflammatory phase. Hydrofera blue for its bacteriostatic properties and to assist in drainage management.   03/31/21: thigh wound healing well now to a stage 2.  Continue current plan.  All interventions in place.   ABD more denuded and bloody.  Arti to assist in moving wound through inflammatory phase.   3/23/21: Right posterior thigh wound remains a DTI but appears to be evolving into a stage 2. Wound team was previously consulted regarding sacral wound which appeared to be a skin tear however skin now appears to be intact however discolored. Does not appear to be pressure related as pt is on a waffle, self mobile and sacral mepilex is in use. Area is also not a normal pressure injury shape. Wound team will continue to follow.   3/18/21: Pt with linear DTIs to R posterior thigh from lying on top of frye cath tubing. Offloading precautions ordered. Wound team to continue following.     Goals: Steady decrease in wound area and depth weekly.    WOUND TEAM PLAN OF CARE ([X] for frequency of wound follow up,):   Nursing to follow orders written for wound care. Contact wound team if area fails to progress, deteriorates or with any questions/concerns  Dressing changes by wound team:                   Follow up 3 times weekly:                NPWT change 3 times weekly:   X  Follow up 1-2 times weekly:     Follow up Bi-Monthly:                   Follow up as needed:   Other (explain):     NURSING PLAN OF CARE ORDERS (X):  Dressing changes: See Dressing Care orders: X  Skin care: See Skin Care orders: x  RN Prevention Protocol: x  Rectal tube care: See Rectal Tube Care orders:   Other orders:     Anticipated discharge plans:   LTACH:        SNF/Rehab:                  Home Health Care:           Outpatient Wound Center:            Self/Family  Care:        Other:   Group Home

## 2021-06-28 NOTE — CARE PLAN
The patient is Watcher - Medium risk of patient condition declining or worsening    Shift Goals  Clinical Goals: Skin integrity will improve   Patient Goals: rest  Family Goals: n/a    Progress made toward(s) clinical / shift goals:  Appropriate skin breakdown preventions in use, Q2 turns in place, incontinence checks completed       Problem: Fall Risk  Goal: Patient will remain free from falls  Outcome: Progressing     Problem: Knowledge Deficit - Standard  Goal: Patient and family/care givers will demonstrate understanding of plan of care, disease process/condition, diagnostic tests and medications  Outcome: Progressing

## 2021-06-28 NOTE — CARE PLAN
Problem: Depression  Goal: Patient and family/caregiver will verbalize accurate information about at least two of the possible causes of depression, three-four of the signs and symptoms of depression  Outcome: Progressing  Note: Encouraged patient to verbalize thoughts and feelings.      Problem: Provide Safe Environment  Goal: Suicide environmental safety, protocols, policies, and practices will be implemented  Outcome: Progressing  Note: Patient reoriented to fall precautions. Using call light appropriately. Bed low/locked, non skid socks in place. Patient remains free from injury. Patient denies SI this shift.      Problem: Bowel Elimination  Goal: Establish and maintain regular bowel function  Outcome: Progressing  Note: Multiple liquid stools this shift. Sample sent to lab for C.diff rule out     The patient is Stable - Low risk of patient condition declining or worsening    Shift Goals  Clinical Goals: q2 turns, OOB activity, pain managment,   Patient Goals: rest  Family Goals: n/a    Progress made toward(s) clinical / shift goals:  q2 turns, pain managed per MAR. Patient able to rest comfortably.    Patient is not progressing towards the following goals: unable to perform OOB activity today.

## 2021-06-28 NOTE — PROGRESS NOTES
2 RN SKIN CHECK     BLE have 2+ pitting edema  SCDs on  Wound vac on abdomen is CDI, running appropriately w/ no leaks detected   PIV RUE   Redness/moisture in pannus, interdry placed   Perioneum and sacral redness seen    Barrier paste applied after incontinent episodes   Q2 turns in place   Low air loss bed in use, waffle overlay on   Appropriate skin breakdown preventions in use   All needs met at this time

## 2021-06-28 NOTE — CARE PLAN
Problem: Knowledge Deficit - Standard  Goal: Patient and family/care givers will demonstrate understanding of plan of care, disease process/condition, diagnostic tests and medications  Outcome: Progressing  Note: Pt AOx4 able to make needs known, pt able to verbalize understanding of POC, scheduled and PRN medication ordered.      Problem: Depression  Goal: Patient and family/caregiver will verbalize accurate information about at least two of the possible causes of depression, three-four of the signs and symptoms of depression  Outcome: Progressing  Note: Pt verbailizes understanding of possible causes of depression and s/s of depression.      Problem: Fall Risk  Goal: Patient will remain free from falls  Outcome: Progressing  Note: 16 risk for falls.  Non skid socks, fall band on, hourly rounding in place, call light within reach, path free of clutter. Pt calls appropriately. Education provided on need to call staff for assistance pt states understanding.    The patient is Stable - Low risk of patient condition declining or worsening    Shift Goals  Clinical Goals: Skin integrity will improve   Patient Goals: rest  Family Goals: n/a

## 2021-06-28 NOTE — DISCHARGE PLANNING
Care Transition Team Discharge Planning    Anticipates discharge disposition:  • SNF    Action:  This RN CM is following the case. Pt is still pending acceptance at [facility].    Per rounds with Dr Lazo today Pt is not yet medically clear and that Pt will be scheduled for a CT Scan. Pt still has a wound vac on abdomen.    Barriers to Discharge:  • Pending medical clearance  • Pending SNF acceptance    Plan:  • Will continue to assist Pt with discharge as needed

## 2021-06-28 NOTE — PROGRESS NOTES
ISOLATION PRECAUTIONS EDUCATION    Educated PATIENT, FAMILY, S.O: patient on isolation for R/O C-diff    Educated on reason for isolation, how the infection may be transmitted, and how to help prevent transmission to others. Educated precautions involves staff and visitors wearing PPE, following Standard Precautions and performing meticulous hand hygiene in order to prevent transmission of infection.     Contact Precautions: Educated that Contact Precautions involves staff and visitors wearing gowns and gloves when in the patient room.     In addition, educated that the patient may leave the room, but prior to exiting the patient room each time, the patient needs to have on a fresh patient gown, ensure the potentially infectious area is covered, and perform hand hygiene with soap and water or alcohol-based hand rub, immediately prior to exiting the room.       Patient transport and mobilization on unit  Educated that they may leave their room, but prior to exiting, the patient needs to have on a fresh patient gown, ensure the potentially infectious area is covered, performing appropriate hand hygiene immediately prior to exiting the room.

## 2021-06-29 PROBLEM — E66.09 OBESITY DUE TO EXCESS CALORIES WITH SERIOUS COMORBIDITY: Status: ACTIVE | Noted: 2017-11-11

## 2021-06-29 LAB
ANION GAP SERPL CALC-SCNC: 9 MMOL/L (ref 7–16)
BASOPHILS # BLD AUTO: 0.6 % (ref 0–1.8)
BASOPHILS # BLD: 0.06 K/UL (ref 0–0.12)
BUN SERPL-MCNC: 24 MG/DL (ref 8–22)
CALCIUM SERPL-MCNC: 8.7 MG/DL (ref 8.5–10.5)
CHLORIDE SERPL-SCNC: 104 MMOL/L (ref 96–112)
CO2 SERPL-SCNC: 22 MMOL/L (ref 20–33)
CREAT SERPL-MCNC: 1.3 MG/DL (ref 0.5–1.4)
EOSINOPHIL # BLD AUTO: 0.5 K/UL (ref 0–0.51)
EOSINOPHIL NFR BLD: 4.9 % (ref 0–6.9)
ERYTHROCYTE [DISTWIDTH] IN BLOOD BY AUTOMATED COUNT: 52.8 FL (ref 35.9–50)
GLUCOSE SERPL-MCNC: 104 MG/DL (ref 65–99)
HCT VFR BLD AUTO: 34.4 % (ref 42–52)
HGB BLD-MCNC: 10.5 G/DL (ref 14–18)
IMM GRANULOCYTES # BLD AUTO: 0.07 K/UL (ref 0–0.11)
IMM GRANULOCYTES NFR BLD AUTO: 0.7 % (ref 0–0.9)
LYMPHOCYTES # BLD AUTO: 2.82 K/UL (ref 1–4.8)
LYMPHOCYTES NFR BLD: 27.6 % (ref 22–41)
MCH RBC QN AUTO: 27.9 PG (ref 27–33)
MCHC RBC AUTO-ENTMCNC: 30.5 G/DL (ref 33.7–35.3)
MCV RBC AUTO: 91.5 FL (ref 81.4–97.8)
MONOCYTES # BLD AUTO: 0.87 K/UL (ref 0–0.85)
MONOCYTES NFR BLD AUTO: 8.5 % (ref 0–13.4)
NEUTROPHILS # BLD AUTO: 5.89 K/UL (ref 1.82–7.42)
NEUTROPHILS NFR BLD: 57.7 % (ref 44–72)
NRBC # BLD AUTO: 0 K/UL
NRBC BLD-RTO: 0 /100 WBC
PLATELET # BLD AUTO: 248 K/UL (ref 164–446)
PMV BLD AUTO: 11.7 FL (ref 9–12.9)
POTASSIUM SERPL-SCNC: 3.4 MMOL/L (ref 3.6–5.5)
RBC # BLD AUTO: 3.76 M/UL (ref 4.7–6.1)
SODIUM SERPL-SCNC: 135 MMOL/L (ref 135–145)
WBC # BLD AUTO: 10.2 K/UL (ref 4.8–10.8)

## 2021-06-29 PROCEDURE — 97530 THERAPEUTIC ACTIVITIES: CPT

## 2021-06-29 PROCEDURE — 700102 HCHG RX REV CODE 250 W/ 637 OVERRIDE(OP): Performed by: INTERNAL MEDICINE

## 2021-06-29 PROCEDURE — 700102 HCHG RX REV CODE 250 W/ 637 OVERRIDE(OP): Performed by: FAMILY MEDICINE

## 2021-06-29 PROCEDURE — A9270 NON-COVERED ITEM OR SERVICE: HCPCS | Performed by: INTERNAL MEDICINE

## 2021-06-29 PROCEDURE — A9270 NON-COVERED ITEM OR SERVICE: HCPCS | Performed by: FAMILY MEDICINE

## 2021-06-29 PROCEDURE — 99232 SBSQ HOSP IP/OBS MODERATE 35: CPT | Performed by: INTERNAL MEDICINE

## 2021-06-29 PROCEDURE — 770001 HCHG ROOM/CARE - MED/SURG/GYN PRIV*

## 2021-06-29 PROCEDURE — 80048 BASIC METABOLIC PNL TOTAL CA: CPT

## 2021-06-29 PROCEDURE — 302146: Performed by: FAMILY MEDICINE

## 2021-06-29 PROCEDURE — 36415 COLL VENOUS BLD VENIPUNCTURE: CPT

## 2021-06-29 PROCEDURE — 85025 COMPLETE CBC W/AUTO DIFF WBC: CPT

## 2021-06-29 RX ADMIN — FUROSEMIDE 20 MG: 20 TABLET ORAL at 05:31

## 2021-06-29 RX ADMIN — AMIODARONE HYDROCHLORIDE 200 MG: 200 TABLET ORAL at 17:41

## 2021-06-29 RX ADMIN — OMEPRAZOLE 40 MG: 20 CAPSULE, DELAYED RELEASE ORAL at 17:40

## 2021-06-29 RX ADMIN — OXYCODONE 10 MG: 5 TABLET ORAL at 22:01

## 2021-06-29 RX ADMIN — ZIPRASIDONE HYDROCHLORIDE 60 MG: 60 CAPSULE ORAL at 08:39

## 2021-06-29 RX ADMIN — OXYCODONE 10 MG: 5 TABLET ORAL at 11:28

## 2021-06-29 RX ADMIN — CLONAZEPAM 0.5 MG: 0.5 TABLET ORAL at 08:38

## 2021-06-29 RX ADMIN — ATORVASTATIN CALCIUM 40 MG: 40 TABLET, FILM COATED ORAL at 17:41

## 2021-06-29 RX ADMIN — TAMSULOSIN HYDROCHLORIDE 0.4 MG: 0.4 CAPSULE ORAL at 08:39

## 2021-06-29 RX ADMIN — HYDROCORTISONE 5 MG: 5 TABLET ORAL at 15:59

## 2021-06-29 RX ADMIN — POTASSIUM CHLORIDE 40 MEQ: 1500 TABLET, EXTENDED RELEASE ORAL at 05:30

## 2021-06-29 RX ADMIN — PAROXETINE HYDROCHLORIDE 20 MG: 20 TABLET, FILM COATED ORAL at 08:38

## 2021-06-29 RX ADMIN — HYDROCORTISONE 5 MG: 5 TABLET ORAL at 05:33

## 2021-06-29 RX ADMIN — CLONAZEPAM 1 MG: 1 TABLET ORAL at 17:40

## 2021-06-29 RX ADMIN — APIXABAN 5 MG: 5 TABLET, FILM COATED ORAL at 17:41

## 2021-06-29 RX ADMIN — LEVOTHYROXINE SODIUM 125 MCG: 0.12 TABLET ORAL at 05:30

## 2021-06-29 RX ADMIN — METOPROLOL TARTRATE 25 MG: 25 TABLET, FILM COATED ORAL at 17:41

## 2021-06-29 RX ADMIN — OXYCODONE 10 MG: 5 TABLET ORAL at 17:41

## 2021-06-29 RX ADMIN — Medication 1000 UNITS: at 05:30

## 2021-06-29 RX ADMIN — ZIPRASIDONE HYDROCHLORIDE 60 MG: 60 CAPSULE ORAL at 21:58

## 2021-06-29 RX ADMIN — METOPROLOL TARTRATE 25 MG: 25 TABLET, FILM COATED ORAL at 05:30

## 2021-06-29 RX ADMIN — APIXABAN 5 MG: 5 TABLET, FILM COATED ORAL at 05:30

## 2021-06-29 RX ADMIN — OXYCODONE 10 MG: 5 TABLET ORAL at 05:58

## 2021-06-29 RX ADMIN — AMIODARONE HYDROCHLORIDE 200 MG: 200 TABLET ORAL at 05:31

## 2021-06-29 RX ADMIN — OMEPRAZOLE 40 MG: 20 CAPSULE, DELAYED RELEASE ORAL at 05:30

## 2021-06-29 RX ADMIN — POTASSIUM CHLORIDE 40 MEQ: 1500 TABLET, EXTENDED RELEASE ORAL at 17:41

## 2021-06-29 ASSESSMENT — ENCOUNTER SYMPTOMS
SHORTNESS OF BREATH: 0
NERVOUS/ANXIOUS: 0
WEAKNESS: 1
FOCAL WEAKNESS: 0
COUGH: 0
DIARRHEA: 1
VOMITING: 0
CHILLS: 0
ABDOMINAL PAIN: 1
BLURRED VISION: 0
DEPRESSION: 0
MYALGIAS: 0
HEARTBURN: 0
SPEECH CHANGE: 0
FEVER: 0
BLOOD IN STOOL: 0
DIZZINESS: 0
NAUSEA: 0

## 2021-06-29 ASSESSMENT — COGNITIVE AND FUNCTIONAL STATUS - GENERAL
MOBILITY SCORE: 6
MOVING FROM LYING ON BACK TO SITTING ON SIDE OF FLAT BED: UNABLE
TURNING FROM BACK TO SIDE WHILE IN FLAT BAD: UNABLE
STANDING UP FROM CHAIR USING ARMS: TOTAL
MOVING TO AND FROM BED TO CHAIR: UNABLE
WALKING IN HOSPITAL ROOM: TOTAL
CLIMB 3 TO 5 STEPS WITH RAILING: TOTAL
SUGGESTED CMS G CODE MODIFIER MOBILITY: CN

## 2021-06-29 ASSESSMENT — GAIT ASSESSMENTS
GAIT LEVEL OF ASSIST: MINIMAL ASSIST
DEVIATION: BRADYKINETIC;SHUFFLED GAIT
ASSISTIVE DEVICE: 4 WHEEL WALKER
DISTANCE (FEET): 2

## 2021-06-29 ASSESSMENT — PAIN DESCRIPTION - PAIN TYPE
TYPE: ACUTE PAIN

## 2021-06-29 NOTE — DISCHARGE PLANNING
HCM leader requested DPA fax referral to [facility]     Agency/Facility Name: [facility]   Referral sent per HCM leader @ 1126     Agency/Facility Name: [facility]   Outcome: Left vmail for [facility] regarding insurance info     [facility] Name: [facility]  Spoke To: Rashel   Outcome: Per Rashel, Pt is declined due to psych needs, bariatric status, max assist and LTC needs     RNCM and HCM leader notified      1232  Agency[facility]Facility Name: [facility]  Outcome: Left Vmail for admissions regarding referral     1234  Agency/Facility Name: [facility]  Spoke To: Noah  Outcome: Per Noah ptis ok to stay on psych meds if wound vac could be changed from veraflo to regular then this pt can be accepted noah states there will be no bed until next week.

## 2021-06-29 NOTE — PROGRESS NOTES
Male pt admitted to [id number] from pacu via gurney w/ pt transport. AAO x3, not in any distress. VSS, Oxy mask on @ 10l sats >97%. Abd lap sites the 3 and todd drain insertion site w/ guaze/tegaerm cdi. Zabala in place draining clear yellow urine. IV fluids infusing w/out problems. Family @ Bedside. Oriented to room and bed.

## 2021-06-29 NOTE — THERAPY
Physical Therapy   Daily Treatment     Patient Name: Sebastián Castro  Age:  56 y.o., Sex:  male  Medical Record #: [id number]  Today's Date: 6/29/2021     Precautions: Fall Risk (wound vac, BMS)    Assessment    Pt conts to make slow progress with fxnl mob however motivated to participate. He conts to require max A for STS transitions with bed level elevated. However once in standing, good ability to wt shift for side stepping. Unsafe for transfer at this time d/t level of assist required to stand up from chair. Worked on STS with low bed level and pt unable to fully come upright with max A provided. Instructed on LAQ with hold, glute sets, and marching to work on. PT to cont to follow.    Plan    Continue current treatment plan.    DC Equipment Recommendations: Unable to determine at this time  Discharge Recommendations: Recommend post-acute placement for additional physical therapy services prior to discharge home     Objective     06/29/21 1513   Sitting Lower Body Exercises   Long Arc Quad Bilateral (1 set of 5, 3s hold)   Marching Reciprocal;1 set of 10   Balance   Sitting Balance (Static) Fair   Sitting Balance (Dynamic) Fair -   Standing Balance (Static) Poor +   Standing Balance (Dynamic) Poor   Gait Analysis   Gait Level Of Assist Minimal Assist (side steps near EOB)   Assistive Device 4 Wheel Walker   Distance (Feet) 2   Deviation Bradykinetic;Shuffled Gait   Bed Mobility    Supine to Sit Moderate Assist   Sit to Supine Moderate Assist (for LE negotiation)   Functional Mobility   Sit to Stand Maximal Assist   Bed, Chair, Wheelchair Transfer Unable to Participate   Short Term Goals    Short Term Goal # 1 Pt will perform supine<->sit with supv within 6 visits in order to return home   Goal Outcome # 1 goal not met   Short Term Goal # 2 Pt will transfer bed<->chair with 4WW with supv within 6 visits in order to return home   Goal Outcome # 2 Goal not met   Short Term Goal # 3 Pt will ambulate 100' with supv within  6 visits in order to return home   Goal Outcome # 3 Goal not met   Short Term Goal # 4 Pt will ascend/descend 5 steps with supv within 6 visits in order to enter/exit home   Goal Outcome # 4 Goal not met

## 2021-06-29 NOTE — PROGRESS NOTES
2 RN SKIN CHECK     BLE have 2+ pitting edema  Wound vac on abdomen is CDI, running appropriately w/ no leaks detected. Serosanguinous drainage. M, W, F, wound team.   PIV RUE, re-secured w/ tape   Redness/moisture in pannus, interdry in place   Perioneum and sacral redness observed               Barrier paste applied around BMS, sacrum, inner thighs/perineal area after change of linens    Scant tess blood noted after BMS care, possible hemorrhoids  Sacrum/back are red but blanchable   Q2 turns in place   Low air loss bed in use, waffle overlay on   Appropriate skin breakdown preventions in use   All needs met at this time

## 2021-06-29 NOTE — CARE PLAN
The patient is Stable - Low risk of patient condition declining or worsening    Shift Goals  Clinical Goals: BMS will remain in and effective   Patient Goals: rest  Family Goals: n/a    Progress made toward(s) clinical / shift goals:  BMS had small leak, tubing readjusted and pt was cleaned up. No signs of leakage at this time.       Problem: Knowledge Deficit - Standard  Goal: Patient and family/care givers will demonstrate understanding of plan of care, disease process/condition, diagnostic tests and medications  Outcome: Progressing     Problem: Provide Safe Environment  Goal: Suicide environmental safety, protocols, policies, and practices will be implemented  Outcome: Progressing     Problem: Fluid Volume  Goal: Fluid volume balance will be maintained  Outcome: Progressing

## 2021-06-29 NOTE — CARE PLAN
Problem: Knowledge Deficit - Standard  Goal: Patient and family/care givers will demonstrate understanding of plan of care, disease process/condition, diagnostic tests and medications  Outcome: Progressing     Problem: Depression  Goal: Patient and family/caregiver will verbalize accurate information about at least two of the possible causes of depression, three-four of the signs and symptoms of depression  Outcome: Progressing     Problem: Fall Risk  Goal: Patient will remain free from falls  Outcome: Progressing   The patient is Stable - Low risk of patient condition declining or worsening    Shift Goals: Turn/reposition pt every 2 hrs,   Clinical Goals: main skin integrity  Patient Goals: rest pain control  Family Goals: n/a    Progress made toward(s) clinical / shift goals:  rectal tube in place    Patient is not progressing towards the following goals:

## 2021-06-29 NOTE — DISCHARGE PLANNING
Care Transition Team Discharge Planning    Anticipates discharge disposition:  • [facility]    Action:  • Per Ton Baires, Admissions at [facility] states that Pt can be accepted with Meds Paxil and Clonazepam as long as Pt will be on a regular wound vac.  • Per ELISA Ayala , Pt is now on a regular wound vac.     This RN CM informed Dr Patel that [facility] might have a bed next week.    Barriers to Discharge:  • Pending medical clearance  • Pending bed availability at [facility]    Plan:  • Will continue to assist Pt with discharge as needed

## 2021-06-29 NOTE — PROGRESS NOTES
Pt is A+Ox4  Complained of 7/10 pain, medicated per MAR   VSS, room air   BMS in place. +water/brown output   +void   Tolerating a low fiber GI soft diet               1500mL fluid restriction               Denies N/V. +diarrhea   Max assist. Pt is very weak, Q2 turns in place   See skin note for assessment               Wound vac, SCDs, skin breakdown, pannus redness, BLE edema   Pt denied thoughts of hurting self or others   Pt updated on POC, approriate fall precautions in place, bed alarm on, all needs met at this time   Hourly rounding

## 2021-06-30 LAB — LACTOFERRIN STL QL IA: POSITIVE

## 2021-06-30 PROCEDURE — 700102 HCHG RX REV CODE 250 W/ 637 OVERRIDE(OP): Performed by: INTERNAL MEDICINE

## 2021-06-30 PROCEDURE — A9270 NON-COVERED ITEM OR SERVICE: HCPCS | Performed by: FAMILY MEDICINE

## 2021-06-30 PROCEDURE — A9270 NON-COVERED ITEM OR SERVICE: HCPCS | Performed by: INTERNAL MEDICINE

## 2021-06-30 PROCEDURE — 700102 HCHG RX REV CODE 250 W/ 637 OVERRIDE(OP): Performed by: FAMILY MEDICINE

## 2021-06-30 PROCEDURE — 97535 SELF CARE MNGMENT TRAINING: CPT

## 2021-06-30 PROCEDURE — 700111 HCHG RX REV CODE 636 W/ 250 OVERRIDE (IP): Performed by: INTERNAL MEDICINE

## 2021-06-30 PROCEDURE — 97606 NEG PRS WND THER DME>50 SQCM: CPT

## 2021-06-30 PROCEDURE — 302098 PASTE RING (FLAT): Performed by: INTERNAL MEDICINE

## 2021-06-30 PROCEDURE — 770001 HCHG ROOM/CARE - MED/SURG/GYN PRIV*

## 2021-06-30 PROCEDURE — 99232 SBSQ HOSP IP/OBS MODERATE 35: CPT | Performed by: INTERNAL MEDICINE

## 2021-06-30 RX ORDER — DIPHENOXYLATE HYDROCHLORIDE AND ATROPINE SULFATE 2.5; .025 MG/1; MG/1
1 TABLET ORAL 4 TIMES DAILY PRN
Status: DISCONTINUED | OUTPATIENT
Start: 2021-06-30 | End: 2021-07-09 | Stop reason: HOSPADM

## 2021-06-30 RX ADMIN — Medication 1000 UNITS: at 06:08

## 2021-06-30 RX ADMIN — HYDROCORTISONE 5 MG: 5 TABLET ORAL at 06:10

## 2021-06-30 RX ADMIN — OXYCODONE 10 MG: 5 TABLET ORAL at 01:17

## 2021-06-30 RX ADMIN — OXYCODONE 10 MG: 5 TABLET ORAL at 22:26

## 2021-06-30 RX ADMIN — CLONAZEPAM 1 MG: 1 TABLET ORAL at 17:05

## 2021-06-30 RX ADMIN — APIXABAN 5 MG: 5 TABLET, FILM COATED ORAL at 06:08

## 2021-06-30 RX ADMIN — OXYCODONE 10 MG: 5 TABLET ORAL at 08:09

## 2021-06-30 RX ADMIN — HYDROMORPHONE HYDROCHLORIDE 0.5 MG: 1 INJECTION, SOLUTION INTRAMUSCULAR; INTRAVENOUS; SUBCUTANEOUS at 11:58

## 2021-06-30 RX ADMIN — FUROSEMIDE 20 MG: 20 TABLET ORAL at 06:08

## 2021-06-30 RX ADMIN — APIXABAN 5 MG: 5 TABLET, FILM COATED ORAL at 17:05

## 2021-06-30 RX ADMIN — METOPROLOL TARTRATE 25 MG: 25 TABLET, FILM COATED ORAL at 17:05

## 2021-06-30 RX ADMIN — PAROXETINE HYDROCHLORIDE 20 MG: 20 TABLET, FILM COATED ORAL at 07:57

## 2021-06-30 RX ADMIN — OMEPRAZOLE 40 MG: 20 CAPSULE, DELAYED RELEASE ORAL at 06:07

## 2021-06-30 RX ADMIN — AMIODARONE HYDROCHLORIDE 200 MG: 200 TABLET ORAL at 17:05

## 2021-06-30 RX ADMIN — CLONAZEPAM 0.5 MG: 0.5 TABLET ORAL at 07:57

## 2021-06-30 RX ADMIN — ZIPRASIDONE HYDROCHLORIDE 60 MG: 60 CAPSULE ORAL at 07:57

## 2021-06-30 RX ADMIN — TAMSULOSIN HYDROCHLORIDE 0.4 MG: 0.4 CAPSULE ORAL at 07:57

## 2021-06-30 RX ADMIN — OMEPRAZOLE 40 MG: 20 CAPSULE, DELAYED RELEASE ORAL at 17:05

## 2021-06-30 RX ADMIN — OXYCODONE 10 MG: 5 TABLET ORAL at 15:30

## 2021-06-30 RX ADMIN — ATORVASTATIN CALCIUM 40 MG: 40 TABLET, FILM COATED ORAL at 17:05

## 2021-06-30 RX ADMIN — METOPROLOL TARTRATE 25 MG: 25 TABLET, FILM COATED ORAL at 06:08

## 2021-06-30 RX ADMIN — HYDROCORTISONE 5 MG: 5 TABLET ORAL at 15:30

## 2021-06-30 RX ADMIN — POTASSIUM CHLORIDE 40 MEQ: 1500 TABLET, EXTENDED RELEASE ORAL at 06:07

## 2021-06-30 RX ADMIN — POTASSIUM CHLORIDE 40 MEQ: 1500 TABLET, EXTENDED RELEASE ORAL at 17:05

## 2021-06-30 RX ADMIN — AMIODARONE HYDROCHLORIDE 200 MG: 200 TABLET ORAL at 06:07

## 2021-06-30 RX ADMIN — ZIPRASIDONE HYDROCHLORIDE 60 MG: 60 CAPSULE ORAL at 22:07

## 2021-06-30 RX ADMIN — LEVOTHYROXINE SODIUM 125 MCG: 0.12 TABLET ORAL at 06:08

## 2021-06-30 ASSESSMENT — ENCOUNTER SYMPTOMS
FEVER: 0
DIZZINESS: 0
HEARTBURN: 0
WEAKNESS: 1
MYALGIAS: 0
CHILLS: 0
BLURRED VISION: 0
BLOOD IN STOOL: 0
NERVOUS/ANXIOUS: 0
SHORTNESS OF BREATH: 0
DIARRHEA: 1
DEPRESSION: 0
FOCAL WEAKNESS: 0
ABDOMINAL PAIN: 1
SPEECH CHANGE: 0
COUGH: 0
NAUSEA: 0
VOMITING: 0

## 2021-06-30 ASSESSMENT — PAIN DESCRIPTION - PAIN TYPE
TYPE: SURGICAL PAIN
TYPE: ACUTE PAIN
TYPE: SURGICAL PAIN
TYPE: ACUTE PAIN;SURGICAL PAIN
TYPE: ACUTE PAIN
TYPE: ACUTE PAIN
TYPE: SURGICAL PAIN;ACUTE PAIN
TYPE: CHRONIC PAIN;SURGICAL PAIN

## 2021-06-30 ASSESSMENT — COGNITIVE AND FUNCTIONAL STATUS - GENERAL
PERSONAL GROOMING: A LITTLE
DAILY ACTIVITIY SCORE: 16
SUGGESTED CMS G CODE MODIFIER DAILY ACTIVITY: CK
DRESSING REGULAR UPPER BODY CLOTHING: A LITTLE
TOILETING: A LOT
HELP NEEDED FOR BATHING: A LOT
DRESSING REGULAR LOWER BODY CLOTHING: A LOT

## 2021-06-30 NOTE — PROGRESS NOTES
Assumed care of patient this morning. Pt is A&O 4. Pt is on room air, reports abdominal pain, prn medication administered and pt reports adequate pain relief from medications. Pt updated on current plan of care. Wound vac to mid abdominal wound, wound vac settings assessed. BMS in place, pt continuing to have leakage around system, RN will notify wound team when they change wound vac dressing today. Pt encouraged to voice needs and concerns, call light is within reach, Q2h turns, and hourly rounding in place.

## 2021-06-30 NOTE — PROGRESS NOTES
2 RN SKIN CHECK     BLE have 2+ pitting edema  Dependent edema on BUE   Wound vac on abdomen is CDI, running appropriately w/ no leaks detected. Serosanguinous drainage. M, W, F, wound team.   PIV RUE  Redness/moisture in pannus, interdry in place   Perioneum and sacral redness observed               Barrier paste applied around BMS, sacrum, inner thighs/perineal area after change of linens               Scant tess blood noted after BMS care, possible hemorrhoids  Foreskin cleaned, penis is red/skin irritated and moist     Site cleansed and dried  Sacrum/back are red but blanchable   Q2 turns in place   Low air loss bed in use, waffle overlay on   Appropriate skin breakdown preventions in use   All needs met at this time

## 2021-06-30 NOTE — PROGRESS NOTES
Pt is A+Ox4  Complained of 7/10 pain, medicated per MAR   VSS, room air   BMS in place. +water/brown output. +leak, wound team aware   +void   Tolerating a low fiber GI soft diet               1500mL fluid restriction               Denies N/V. +diarrhea   Max assist. Pt is very weak, Q2 turns in place   See skin note for assessment               Wound vac, SCDs, skin breakdown, pannus redness, BLE edema, redness/whitening skin around penis   Pt denied thoughts of hurting self or others   Pt updated on POC, approriate fall precautions in place, bed alarm on, all needs met at this time   Hourly rounding

## 2021-06-30 NOTE — CARE PLAN
The patient is Stable - Low risk of patient condition declining or worsening    Shift Goals  Clinical Goals: Q2h turns to maintain skin integrity, pain control, and sit edge of bed at least once today   Patient Goals: pain control and skin integrity   Family Goals: n/a    Progress made toward(s) clinical / shift goals:   Q2h turns in place. Patient calls if he is incontinent so we can clean him. Hourly rounding in place. Pain control is adequate at this time per the patients report to RN.   Problem: Psychosocial  Goal: Patient's ability to identify and develop effective coping behaviors will improve  Outcome: Progressing     Problem: Psychosocial  Goal: Patient's ability to re-evaluate and adapt role responsibilities will improve  Outcome: Progressing     Problem: Fall Risk  Goal: Patient will remain free from falls  Outcome: Progressing     Problem: Mobility  Goal: Patient's capacity to carry out activities will improve  Outcome: Progressing       Patient is not progressing towards the following goals:

## 2021-06-30 NOTE — PROGRESS NOTES
[facility] Medicine Daily Progress Note    Date of Service  6/30/2021    Chief Complaint  56 y.o. male admitted 2/9/2021 with Syncope.    Hospital Course  Admitted with syncope, he was noted to be bradycardic and hypotensive.  He has known history of permanent atrial fibrillation and he was on diltiazem, digoxin and metoprolol.  These medications were held.  Later on he was restarted on low-dose Toprol and Digoxin.  He was also started on anticoagulation with Eliquis.  He also had a possible history of adrenal insufficiency, he was already on Florinef and midodrine.  Midodrine was discontinued and he was started on cortisol. Further work-up showed that he had gram-negative bacteremia as well as urinary tract infection and acute kidney injury.  He was initially placed on IV Zosyn, later changed to oral Bactrim to finish the course.  He was carefully hydrated with IV fluids.  On admission he also expressed suicidal ideation.  Psychiatry was consulted the case, he was placed on a legal hold.  He was noted to have severe depression.  He has been placed on medication the form of Geodon, Paxil, Klonopin.  He has a colostomy in place, which the patient believes contributes to his severe depression because of ostomy care.  He had history of bowel perforation and splenic fluid collection requiring Segmental colectomy, colostomy creation, mobilization of splenic flexure, wound VAC placement, irrigation and debridement of flank wound on 11/10/2020.  Surgery was consulted on the case, and he underwent exploratory laparotomy and colostomy takedown and closure on 5/27/2021.  Wound VAC was later placed.  Psychiatry continued to follow the patient, his legal hold was discontinued on 6/4/2021    Interval Problem Update  Colostomy - pain is decent.     Dirrhea-remains about the same. He feels it may even be worse, BMS in place.    Consultants/Specialty  Cardiology  Psychiatry  Surgery    Code Status  Full Code    Disposition  SNF      Review of Systems  Review of Systems   Constitutional: Negative for chills, fever and malaise/fatigue.   Eyes: Negative for blurred vision.   Respiratory: Negative for cough and shortness of breath.    Cardiovascular: Negative for chest pain.   Gastrointestinal: Positive for abdominal pain and diarrhea (no changes). Negative for blood in stool, heartburn, melena, nausea and vomiting.        Slightly improved today   Genitourinary: Negative for dysuria.   Musculoskeletal: Negative for myalgias.   Skin: Negative for rash.   Neurological: Positive for weakness. Negative for dizziness, speech change and focal weakness.   Psychiatric/Behavioral: Negative for depression. The patient is not nervous/anxious.    All other systems reviewed and are negative.     Physical Exam  Temp:  [36.2 °C (97.1 °F)-36.7 °C (98 °F)] 36.5 °C (97.7 °F)  Pulse:  [66-89] 89  Resp:  [16-18] 18  BP: (101-111)/(58-69) 111/65  SpO2:  [92 %-96 %] 95 %    Physical Exam  Vitals and nursing note reviewed.   Constitutional:       Appearance: He is obese.      Comments: Weak appearing  'Pale   HENT:      Head: Normocephalic and atraumatic.      Nose: No congestion.      Mouth/Throat:      Mouth: Mucous membranes are moist.   Eyes:      Extraocular Movements: Extraocular movements intact.      Conjunctiva/sclera: Conjunctivae normal.      Pupils: Pupils are equal, round, and reactive to light.   Cardiovascular:      Rate and Rhythm: Normal rate. Rhythm irregularly irregular.   Pulmonary:      Effort: Pulmonary effort is normal.      Breath sounds: Normal breath sounds.   Abdominal:      General: There is no distension.      Tenderness: There is abdominal tenderness. There is no guarding or rebound.      Comments: Wound vac  Body mass index is 50.08 kg/m²   Genitourinary:     Comments: BMS in place, liquid brown stool observed  Musculoskeletal:      Cervical back: No muscular tenderness.      Right lower leg: Edema present.      Left lower leg: Edema  present.   Skin:     General: Skin is warm and dry.      Coloration: Skin is pale.   Neurological:      General: No focal deficit present.      Mental Status: He is alert and oriented to person, place, and time.      Cranial Nerves: No cranial nerve deficit.   Psychiatric:         Mood and Affect: Mood is not anxious or depressed.         Thought Content: Thought content is not paranoid or delusional. Thought content does not include homicidal or suicidal ideation. Thought content does not include homicidal or suicidal plan.       Fluids    Intake/Output Summary (Last 24 hours) at 6/30/2021 1231  Last data filed at 6/30/2021 0800  Gross per 24 hour   Intake 640 ml   Output 1050 ml   Net -410 ml       Laboratory  Recent Labs     06/28/21  0506 06/29/21  0601   WBC 10.2 10.2   RBC 3.67* 3.76*   HEMOGLOBIN 10.4* 10.5*   HEMATOCRIT 33.6* 34.4*   MCV 91.6 91.5   MCH 28.3 27.9   MCHC 31.0* 30.5*   RDW 53.8* 52.8*   PLATELETCT 214 248   MPV 12.0 11.7     Recent Labs     06/28/21  0506 06/29/21  0601   SODIUM 138 135   POTASSIUM 3.3* 3.4*   CHLORIDE 106 104   CO2 21 22   GLUCOSE 109* 104*   BUN 23* 24*   CREATININE 1.24 1.30   CALCIUM 8.4* 8.7                   Imaging  CT-ABDOMEN-PELVIS W/O   Final Result         1. Liquid stool in the distal colon, consistent with diarrhea.   2. Unchanged perirectal fat stranding, sequela of nonspecific proctitis.   3. Cholelithiasis. No cholecystitis.   4. Decreased blood pool attenuation, suggesting anemia.   5. Chronic T11 and T12 compression fractures.      US-RENAL   Final Result      1.  Low-level echogenic debris in the urinary bladder is noted which could indicate UTI or blood within the bladder.      2.  No hydronephrosis.      IR-MIDLINE CATHETER INSERTION WO GUIDANCE > AGE 3   Final Result                  Ultrasound-guided midline placement performed by qualified nursing staff    as above.          EC-ECHOCARDIOGRAM COMPLETE W/ CONT   Final Result      DX-CHEST-PORTABLE (1  VIEW)   Final Result      1.  Minor asymmetric interstitial opacity in the right lower lobe which could represent minor interstitial edema, pneumonia, or fibrotic change.      2.  No left lung consolidation.      3.  No evidence of congestive failure.      DZ-[id number]-1 VIEW   Final Result         No specific finding to suggest small bowel obstruction.      [id number]-8 VIEW   Final Result         No significant interval change.      DX-CHEST-LIMITED (1 VIEW)   Final Result         Diffuse interstitial prominence could relate to mild pulmonary edema or atypical infection      [id number]-[id number]-6 VIEW   Final Result      Increased colonic gas without definite bowel obstruction.      US-RENAL   Final Result      No evidence of hydronephrosis.      Lobulated kidneys bilaterally.      CT-ABDOMEN-PELVIS WITH   Final Result      1.  There is no evidence of small bowel obstruction.   2.  There is a left lower quadrant ostomy.   3.  There is fluid distention of the colon distal to the surgical material in the left mid descending colon extending all the way to the rectum. There is no pneumatosis or free air.   4.  There is cholelithiasis without biliary dilatation.   5.  There has been interval removal of the drainage catheter anterior to the spleen with minimal hypodense area in the anterior spleen again noted. There is no new fluid collection.      IR-US GUIDED PIV   Final Result    Ultrasound-guided PERIPHERAL IV INSERTION performed by    qualified nursing staff as above.      EC-ECHOCARDIOGRAM COMPLETE W/O CONT   Final Result      DX-LUMBAR SPINE-2 OR 3 VIEWS   Final Result      Moderate compression deformity of T11 is new compared to 2018.      Mild wedge deformity of T12 is unchanged.      Degenerative changes including facet arthropathy.      Mild retrolisthesis of L5 on S1 and L3 on L4.              Assessment/Plan  Diarrhea- (present on admission)  Assessment & Plan  C diff negative  Check stool wbc and  culture-pending  Check CT abd/pelvis, negative for acute findings  -will trial low dose lomotil, BMS in place  -Fecal procalnectin pending  -unclear etiology  -consider celiac sprue work up    Normocytic anemia- (present on admission)  Assessment & Plan  Follow cbc    Urinary tract infection- (present on admission)  Assessment & Plan  Completed 10-day course of Bactrim    Leukocytosis- (present on admission)  Assessment & Plan  Follow cbc    Obstructive uropathy- (present on admission)  Assessment & Plan  resolved    Gram-negative bacteremia- (present on admission)  Assessment & Plan  Completed 10-day course of Bactrim    Permanent atrial fibrillation (HCC)- (present on admission)  Assessment & Plan  Metoprolol, Eliquis    Chronic venous insufficiency of lower extremity- (present on admission)  Assessment & Plan  compression stockings.    Chronic heart failure with preserved ejection fraction (HCC)- (present on admission)  Assessment & Plan  Metoprolol, Lasix  Fluid restriction of 1.5 L per day      Type 2 diabetes mellitus with hyperglycemia, with long-term current use of insulin (HCC)- (present on admission)  Assessment & Plan  Follow bmp    Major depressive disorder, recurrent, severe with psychotic features (HCC)- (present on admission)  Assessment & Plan  Geodon, Paxil, Klonopin    Colostomy present (HCC)- (present on admission)  Assessment & Plan  Exploratory laparotomy with colostomy takedown and closure 5/27/2021, no longer present        Hypothyroidism- (present on admission)  Assessment & Plan  Increased Levothyroxine to 125 mcg  Check TSH on 6/29/2021, pending at this time    SRUTHI (acute kidney injury) (HCC)- (present on admission)  Assessment & Plan  Resolved    Adrenal insufficiency (HCC)- (present on admission)  Assessment & Plan  Cortef 5 mg BID  Plan to decrease to daily on 7/4/2021    Debility- (present on admission)  Assessment & Plan  PT and OT    Suicidal ideation- (present on  admission)  Assessment & Plan  Legal hold discontinued 6/4/2021    Orthostatic hypotension- (present on admission)  Assessment & Plan  off Florinef.    Mixed hyperlipidemia- (present on admission)  Assessment & Plan  Atorvastatin.     Lower limb amputation, great toe (HCC)- (present on admission)  Assessment & Plan  Monitor  No signs of infection     Hypomagnesemia- (present on admission)  Assessment & Plan  IV Mg 2 g  Follow level    Obesity due to excess calories with serious comorbidity- (present on admission)  Assessment & Plan  Body mass index is 50.08 kg/m².    Hypokalemia- (present on admission)  Assessment & Plan  Kdur  Follow bmp       VTE prophylaxis: Lovenox

## 2021-06-30 NOTE — DISCHARGE PLANNING
Anticipated Discharge Disposition: [facility]    Action: Patient accepted to [facility] SNF. Per [facility]PA [facility] does not have any male beds until next week.     Barriers to Discharge: SNF bed availability     Plan: Follow up with medical team.

## 2021-06-30 NOTE — CARE PLAN
The patient is Stable - Low risk of patient condition declining or worsening    Shift Goals  Clinical Goals: Skin integrity will maintain   Patient Goals: rest pain control  Family Goals: n/a    Progress made toward(s) clinical / shift goals:  Full linen richard completed multiple times, barrier paste and wipes in use, Q2 turns, perineum/penis cleansed, appropriate skin breakdown precautions in place.       Problem: Provide Safe Environment  Goal: Suicide environmental safety, protocols, policies, and practices will be implemented  Outcome: Progressing     Problem: Knowledge Deficit - Standard  Goal: Patient and family/care givers will demonstrate understanding of plan of care, disease process/condition, diagnostic tests and medications  Outcome: Progressing

## 2021-06-30 NOTE — WOUND TEAM
[facility]  Inpatient Services  Wound and Skin Care Progress Note    Admission Date: 2/9/2021     Last order of IP CONSULT TO WOUND CARE was found on 3/18/2021 from Hospital Encounter on 2/9/2021     HPI, PMH, SH: Reviewed    Past Surgical History:   Procedure Laterality Date   • PB EXPLORATORY OF ABDOMEN  5/27/2021    Procedure: LAPAROTOMY, EXPLORATORY;  Surgeon: Kin Desouza D.O.;  Location: [facility];  Service: General   • COLOSTOMY TAKEDOWN  5/27/2021    Procedure: COLOSTOMY TAKEDOWN AND CLOSURE;  Surgeon: Kin Desouza D.O.;  Location: SURGERY [facility];  Service: General   • COLECTOMY N/A 11/10/2020    Procedure: COLECTOMY-SEGMENTAL, COLOSTOMY, MOBILIZATION OF SPLENIC FLEXURE, WOUND VAC PLACEMENT, IRRIGATION AND DEBRIDMENT FLANK WOUND;  Surgeon: Kin Desouza D.O.;  Location: [facility];  Service: General   • ZZZ CARDIAC CATH  07/21/2018    EF 45%, normal coronaries.   • IRRIGATION & DEBRIDEMENT ORTHO Right 2/19/2018    Procedure: IRRIGATION & DEBRIDEMENT ORTHO-FOOT;  Surgeon: Kirby Lopez M.D.;  Location: [facility];  Service: Orthopedics   • TOE AMPUTATION Right 1/17/2018    Procedure: TOE AMPUTATION-1ST RAY;  Surgeon: Doug Delong M.D.;  Location: [facility];  Service: Orthopedics   • TOE AMPUTATION Right 11/10/2017    Procedure: TOE AMPUTATION;  Surgeon: Osorio Leo M.D.;  Location: [facility];  Service: Orthopedics     Social History     Tobacco Use   • Smoking status: Never Smoker   • Smokeless tobacco: Never Used   Substance Use Topics   • Alcohol use: No     Chief Complaint   Patient presents with   • Syncope     X1 this morning when standing up to go to bathroom     Diagnosis: Syncope and collapse [R55]  Suicidal ideation [R45.851]  Sepsis due to Klebsiella pneumoniae (HCC) [A41.4]    Unit where seen by Wound Team: T418/00     WOUND CONSULT/FOLLOW UP RELATED TO: abdominal wound    WOUND HISTORY:  Pt is an older  gentleman well known to [facility] wound team 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 Pt currently admitted since 2/9/21 related to SI concerns due to body image issues related to colostomy.      *Pt underwent surgical reversal of colostomy with Dr. Desouza on 5/20/21. MLI was re-opened at that time and was then closed with interrupted staples and packing. Unfortunately MLI continued to Dehisce and therefore wound team was consulted to further manage MLI with dakins packing.     6/3 Wound vac placed    WOUND ASSESSMENT/LDA     Negative Pressure Wound Therapy 06/03/21 Abdomen (Active)   Vacuum Serial Number [id number] 06/26/21 2054   NPWT Pump Mode / Pressure Setting Ulta;Continuous;125 mmHg    Dressing Type Medium;Black Foam (Regular)    Number of Foam Pieces Used 2    Canister Changed No    Output (mL) 0 mL    NEXT Dressing Change/Treatment Date 07/02/21    VAC VeraFlo Pressure (mm/Hg) Continuous;125 mmHg    WOUND NURSE ONLY - Time Spent with Patient (mins) 45      Wound 05/27/21 Full Thickness Wound Abdomen (Active)   Wound Image    06/30/21 1200   Site Assessment Yellow;Red;Early/partial granulation    Periwound Assessment Clean;Dry;Intact    Margins Attached edges;Defined edges    Closure Secondary intention;Adhesive bandage    Drainage Amount Small    Drainage Description Serosanguineous    Treatments Cleansed;Site care    Wound Cleansing Approved Wound Cleanser    Periwound Protectant Skin Protectant Wipes to Periwound;Paste Ring;Mepitel;Drape    Dressing Cleansing/Solutions Not Applicable    Dressing Options Wound Vac    Dressing Changed Changed    Dressing Status Clean;Dry;Intact    Dressing Change/Treatment Frequency Monday, Wednesday, Friday, and As Needed    NEXT Dressing Change/Treatment Date 07/02/21    NEXT Weekly Photo (Inpatient Only) 07/07/21    Non-staged Wound Description Full thickness    Wound Length (cm) 15.6 cm    Wound Width (cm) 5.2 cm    Wound Depth (cm) 1.5 cm    Wound  Surface Area (cm^2) 81.12 cm^2    Wound Volume (cm^3) 121.68 cm^3    Wound Healing % -49    Shape Linear irregular    Wound Odor None    Pulses N/A    Exposed Structures Sutures;Fascia    WOUND NURSE ONLY - Time Spent with Patient (mins) 60      Vascular:    SHAYAN:   No results found.    Lab Values:    Lab Results   Component Value Date/Time    WBC 10.2 2021 06:01 AM    RBC 3.76 (L) 2021 06:01 AM    HEMOGLOBIN 10.5 (L) 2021 06:01 AM    HEMATOCRIT 34.4 (L) 2021 06:01 AM    CREACTPROT 0.48 06/10/2021 04:05 AM    SEDRATEWES 13 10/23/2018 08:02 AM    HBA1C 5.2 2021 01:30 AM      Culture Results show:  No results found for this or any previous visit (from the past 720 hour(s)).    Pain Level/Medicated:  Tolerated well without pain medication.      INTERVENTIONS BY WOUND TEAM:  Chart and images reviewed. Discussed with bedside RN. All areas of concern (based on picture review, LDA review and discussion with bedside RN) have been thoroughly assessed. Documentation of areas based on significant findings. This RN in to assess patient. Performed standard wound care which includes appropriate positioning, dressing removal and non-selective debridement. Pictures and measurements obtained weekly if/when required.              ABD:   Preparation for Dressing removal:  Removed without difficulty.    Cleansed with: Wound cleanser and gauze   Sharp debridement: NA  Joya wound: Cleansed with Wound cleanser and gauze, No sting skin and 2.5 paste rings. Small piece of mepitel one applied at right distal end over small partial thickness skin tear.  Primary Dressin piece of full thickness spiraled black foam to wound bed. Edges pulled together and secured with drape. A 2nd piece of circular half thickness applied as button for trac pad.   Secondary (Outer) Dressing:  Sealed with drape, and TRAC pad applied.     Interdisciplinary consultation: Patient, Bedside RN (Margot), wound (Therese)     EVALUATION /  "RATIONALE FOR TREATMENT:  Most Recent Date:  6/30/21: Wound continues to progress with significantly less slough and wound is decreasing in size. Continued with NPWT.    6/28/21: Wound continues to have granular tissue. Pt heading to CT scan today. Wound has decreased in width significantly since dc of veraflo. Continued with NPWT.  06/25/21: wound continues to have granulation tissue.  Left abd with some protuding, Dr. natarajan updated and assessed.     6/23/21: wound continues to progress, transitioned to regular vac and continued to pull edges together.   6/21/21: Edges pulled together with jones by Dr. Rooney during change on Friday. Discussed with Lyly today. MD unable to be bedside today. Will continue with veraflo hopefully will be able to transition to regular vac at next visit to further assist with edges being pulled together. Meet with Dr. Rooney on Wednesday at 8am to assess wound.  6/18/21: continuing to decrease in slough in wound bed. One suture towards proximal end visible, per MD rooney, leave in place but monitor. Staples added to superior and inferior ends to assist with closure by MD. Continuing to pull edges together, MD will reassess on Monday with wound team to monitor for improvement and decide if surgical I&D is needed or if appropriate to switch to regular NPWT dressing instead of VF.   6/16/21: Wound with decreased slough. Wound appears to be \"flattening out.\" Veraflo is assisting with cleansing wound but is making pulling edges together difficult. Discussed with Dr. Rooney. Will plan to Meet with Dr. Rooney on Friday at 8am to assess wound, Dr. Rooney to pull superior and inferior borders together and staple to assist with closure.  6/14/21: Wound continues to granulate. Small amount of slough over fascial line. Discussed with Dr. Rooney. Plan to continue with veraflo, settings decreased. Will evaluate the wound with Dr. Rooney on Wednesday at 9am. Per MD may consider OR " washout with staples to distal proximal aspect to assist in wound closure vs. Adding staples at bedside.   06/11/21:  Wound looks good, minimal slough mainly to fascial line sutures.  VF grey foam layer to assist in debridement of slough.  Dr. Desouza to re-assess Monday and do surgical debridement if needed next week.   06/09/21:  Wound continues to progress, pull edges together to lessen width of wound. Dr. Desouza would like to see wound on Friday 6/7: Pt's wound appears to be improving per measurements and pictures. There is granulation tissue present.   06/5/21: Wound bed improving with increased granulation tissue.  Wound edges approximated prior to drape application.  NPWT continued.    06/03/21:  Fascial sutures intact, wound VAC placed with DR. Desouza.    6/1/21: Wound appears  than yesterdays pictures. Discussed with Dr. Desouza, plan to continue dakins TID for 48hrs, CT scan ordered today. Will re-evaluate on Thursday for potential vac placement.   5/31/21: Wound initially with interrupted staples and packing now with dehiscence. Per Dr. Baeza request wound team initiated dakins packing. Pt may benefit from NPWT to assist with closure.   5/8/21: wound nearly healed. Does not require advanced wound care. Wound team signing off and nursing to re consult if site worsens. Continue with dressing care per order.  4/21/21: abd wound improving. Nursing to continue dsg changes per order.  4/15: Right posterior thigh DTI now resolved. Pink and blanching. Abdominal wound to surface level continuing with dressing POC.  4/7/21: Thigh wound nearly healed and will likely be resolved at next assessment. Abdomen wound appears larger. Continued with Arti to move through inflammatory phase. Hydrofera blue for its bacteriostatic properties and to assist in drainage management.   03/31/21: thigh wound healing well now to a stage 2.  Continue current plan.  All interventions in place.   ABD more denuded and bloody.   Arti to assist in moving wound through inflammatory phase.   3/23/21: Right posterior thigh wound remains a DTI but appears to be evolving into a stage 2. Wound team was previously consulted regarding sacral wound which appeared to be a skin tear however skin now appears to be intact however discolored. Does not appear to be pressure related as pt is on a waffle, self mobile and sacral mepilex is in use. Area is also not a normal pressure injury shape. Wound team will continue to follow.   3/18/21: Pt with linear DTIs to R posterior thigh from lying on top of frye cath tubing. Offloading precautions ordered. Wound team to continue following.     Goals: Steady decrease in wound area and depth weekly.    WOUND TEAM PLAN OF CARE ([X] for frequency of wound follow up,):   Nursing to follow orders written for wound care. Contact wound team if area fails to progress, deteriorates or with any questions/concerns  Dressing changes by wound team:                   Follow up 3 times weekly:                NPWT change 3 times weekly:   X  Follow up 1-2 times weekly:     Follow up Bi-Monthly:                   Follow up as needed:   Other (explain):     NURSING PLAN OF CARE ORDERS (X):  Dressing changes: See Dressing Care orders: X  Skin care: See Skin Care orders: x  RN Prevention Protocol: x  Rectal tube care: See Rectal Tube Care orders:   Other orders:     Anticipated discharge plans:   LTACH:        SNF/Rehab:                  Home Health Care:           Outpatient Wound Center:            Self/Family Care:        Other:   Group Home

## 2021-06-30 NOTE — THERAPY
"Occupational Therapy  Daily Treatment     Patient Name: Sebastián Castro  Age:  56 y.o., Sex:  male  Medical Record #: [id number]  Today's Date: 6/30/2021     Precautions: Fall Risk  Comments: wound vac and BMS     Assessment  Pt was seen for OT tx, pt was agreeable to OOB ADL's. Pt has on going bowel issues w/significant incontience even w/BMS. Pt Is able to participate in ADL's seated, but fatigues quickly w/standing activities. OT will continue to follow.     Plan    Continue current treatment plan.    DC Equipment Recommendations: Unable to determine at this time  Discharge Recommendations: Recommend post-acute placement for additional occupational therapy services prior to discharge home    Subjective  \"I wanted to do better than I did\"      Objective     06/30/21 1037   Pain 0 - 10 Group   Therapist Pain Assessment 0;Nurse Notified   Cognition    Cognition / Consciousness X   Level of Consciousness Alert   New Learning Impaired   Attention Impaired   Comments cooperative, flat affect tends to shut down and get easily frustrated    Passive ROM Upper Body   Passive ROM Upper Body WDL   Active ROM Upper Body   Active ROM Upper Body  WDL   Strength Upper Body   Upper Body Strength  X   Gross Strength Generalized Weakness, Equal Bilaterally.    Other Treatments   Other Treatments Provided Focused on seated ADL's and attempts at OOB mobility    Balance   Sitting Balance (Static) Fair   Sitting Balance (Dynamic) Fair   Standing Balance (Static) Fair -   Standing Balance (Dynamic) Poor +   Weight Shift Sitting Fair   Weight Shift Standing Poor   Skilled Intervention Verbal Cuing;Tactile Cuing;Facilitation;Compensatory Strategies   Comments w/4ww   Bed Mobility    Supine to Sit Maximal Assist   Sit to Supine Maximal Assist   Scooting Maximal Assist   Rolling Maximal Assist to Rt.;Maximum Assist to Lt.   Skilled Intervention Verbal Cuing;Facilitation;Compensatory Strategies   Activities of Daily Living   Grooming Minimal " Assist;Seated   Upper Body Dressing Minimal Assist   Lower Body Dressing Maximal Assist   Toileting Total Assist   Skilled Intervention Verbal Cuing;Tactile Cuing;Facilitation;Compensatory Strategies   Comments BMS in place and not working well unable to complete vj care posteriorly    How much help from another person does the patient currently need...   6 Clicks Daily Activity Score 16   Functional Mobility   Sit to Stand Moderate Assist   Bed, Chair, Wheelchair Transfer Moderate Assist   Mobility STS x3 w/side step then BTB   Skilled Intervention Compensatory Strategies;Facilitation;Verbal Cuing   Activity Tolerance   Comments c/o fatigue w/standing activity   Patient / Family Goals   Patient / Family Goal #1 To feel better   Goal #1 Outcome Goal not met   Short Term Goals   Short Term Goal # 1 Pt will perform LB dressing w/ min A   Goal Outcome # 1 Progressing slower than expected   Short Term Goal # 2 Pt will perform functional t/f's with supervision   Goal Outcome # 2 Progressing slower than expected   Short Term Goal # 3 Pt will perform h/g standing sink side with supervision   Goal Outcome # 3 Progressing slower than expected   Education Group   Role of Occupational Therapist Patient Response Patient;Acceptance;Explanation;Demonstration;Verbal Demonstration;Reinforcement Needed   Transfers Patient Response Patient;Acceptance;Explanation;Demonstration;Verbal Demonstration;Reinforcement Needed   ADL Patient Response Patient;Acceptance;Explanation;Demonstration;Verbal Demonstration;Reinforcement Needed   Anticipated Discharge Equipment and Recommendations   DC Equipment Recommendations Unable to determine at this time   Discharge Recommendations Recommend post-acute placement for additional occupational therapy services prior to discharge home   Interdisciplinary Plan of Care Collaboration   IDT Collaboration with  Nursing   Patient Position at End of Therapy In Bed;Call Light within Reach;Tray Table within  Reach;Phone within Reach;Bed Alarm On   Collaboration Comments RN aware of OT tx and pts efforts    Session Information   Date / Session Number  6/30 #8 (1/2, 7/4)   Priority 2

## 2021-07-01 LAB
ANION GAP SERPL CALC-SCNC: 8 MMOL/L (ref 7–16)
BUN SERPL-MCNC: 23 MG/DL (ref 8–22)
CALCIUM SERPL-MCNC: 8.3 MG/DL (ref 8.5–10.5)
CHLORIDE SERPL-SCNC: 107 MMOL/L (ref 96–112)
CO2 SERPL-SCNC: 22 MMOL/L (ref 20–33)
CREAT SERPL-MCNC: 1.26 MG/DL (ref 0.5–1.4)
GLUCOSE SERPL-MCNC: 113 MG/DL (ref 65–99)
POTASSIUM SERPL-SCNC: 3.9 MMOL/L (ref 3.6–5.5)
SODIUM SERPL-SCNC: 137 MMOL/L (ref 135–145)

## 2021-07-01 PROCEDURE — 700102 HCHG RX REV CODE 250 W/ 637 OVERRIDE(OP): Performed by: INTERNAL MEDICINE

## 2021-07-01 PROCEDURE — A9270 NON-COVERED ITEM OR SERVICE: HCPCS | Performed by: INTERNAL MEDICINE

## 2021-07-01 PROCEDURE — 700102 HCHG RX REV CODE 250 W/ 637 OVERRIDE(OP): Performed by: FAMILY MEDICINE

## 2021-07-01 PROCEDURE — 80048 BASIC METABOLIC PNL TOTAL CA: CPT

## 2021-07-01 PROCEDURE — A9270 NON-COVERED ITEM OR SERVICE: HCPCS | Performed by: FAMILY MEDICINE

## 2021-07-01 PROCEDURE — 36415 COLL VENOUS BLD VENIPUNCTURE: CPT

## 2021-07-01 PROCEDURE — 82784 ASSAY IGA/IGD/IGG/IGM EACH: CPT

## 2021-07-01 PROCEDURE — 770001 HCHG ROOM/CARE - MED/SURG/GYN PRIV*

## 2021-07-01 PROCEDURE — 99232 SBSQ HOSP IP/OBS MODERATE 35: CPT | Performed by: INTERNAL MEDICINE

## 2021-07-01 PROCEDURE — 83516 IMMUNOASSAY NONANTIBODY: CPT

## 2021-07-01 RX ORDER — CALCIUM POLYCARBOPHIL 625 MG 625 MG/1
625 TABLET ORAL 2 TIMES DAILY WITH MEALS
Status: DISCONTINUED | OUTPATIENT
Start: 2021-07-01 | End: 2021-07-09 | Stop reason: HOSPADM

## 2021-07-01 RX ADMIN — CLONAZEPAM 1 MG: 1 TABLET ORAL at 17:39

## 2021-07-01 RX ADMIN — ZIPRASIDONE HYDROCHLORIDE 60 MG: 60 CAPSULE ORAL at 08:10

## 2021-07-01 RX ADMIN — METOPROLOL TARTRATE 25 MG: 25 TABLET, FILM COATED ORAL at 17:39

## 2021-07-01 RX ADMIN — TAMSULOSIN HYDROCHLORIDE 0.4 MG: 0.4 CAPSULE ORAL at 08:10

## 2021-07-01 RX ADMIN — POTASSIUM CHLORIDE 40 MEQ: 1500 TABLET, EXTENDED RELEASE ORAL at 17:38

## 2021-07-01 RX ADMIN — AMIODARONE HYDROCHLORIDE 200 MG: 200 TABLET ORAL at 06:40

## 2021-07-01 RX ADMIN — HYDROCORTISONE 5 MG: 5 TABLET ORAL at 15:13

## 2021-07-01 RX ADMIN — APIXABAN 5 MG: 5 TABLET, FILM COATED ORAL at 06:40

## 2021-07-01 RX ADMIN — HYDROCORTISONE 5 MG: 5 TABLET ORAL at 06:41

## 2021-07-01 RX ADMIN — Medication 1000 UNITS: at 06:41

## 2021-07-01 RX ADMIN — OXYCODONE 10 MG: 5 TABLET ORAL at 12:13

## 2021-07-01 RX ADMIN — DIPHENOXYLATE HYDROCHLORIDE AND ATROPINE SULFATE 1 TABLET: 2.5; .025 TABLET ORAL at 21:25

## 2021-07-01 RX ADMIN — CLONAZEPAM 0.5 MG: 0.5 TABLET ORAL at 08:10

## 2021-07-01 RX ADMIN — OMEPRAZOLE 40 MG: 20 CAPSULE, DELAYED RELEASE ORAL at 17:38

## 2021-07-01 RX ADMIN — LEVOTHYROXINE SODIUM 125 MCG: 0.12 TABLET ORAL at 08:10

## 2021-07-01 RX ADMIN — CALCIUM POLYCARBOPHIL 625 MG: 625 TABLET, FILM COATED ORAL at 17:39

## 2021-07-01 RX ADMIN — OXYCODONE 10 MG: 5 TABLET ORAL at 23:57

## 2021-07-01 RX ADMIN — OMEPRAZOLE 40 MG: 20 CAPSULE, DELAYED RELEASE ORAL at 06:41

## 2021-07-01 RX ADMIN — OXYCODONE 10 MG: 5 TABLET ORAL at 06:41

## 2021-07-01 RX ADMIN — APIXABAN 5 MG: 5 TABLET, FILM COATED ORAL at 17:38

## 2021-07-01 RX ADMIN — AMIODARONE HYDROCHLORIDE 200 MG: 200 TABLET ORAL at 17:39

## 2021-07-01 RX ADMIN — METOPROLOL TARTRATE 25 MG: 25 TABLET, FILM COATED ORAL at 06:41

## 2021-07-01 RX ADMIN — ZIPRASIDONE HYDROCHLORIDE 60 MG: 60 CAPSULE ORAL at 21:25

## 2021-07-01 RX ADMIN — PAROXETINE HYDROCHLORIDE 20 MG: 20 TABLET, FILM COATED ORAL at 08:10

## 2021-07-01 RX ADMIN — DIPHENOXYLATE HYDROCHLORIDE AND ATROPINE SULFATE 1 TABLET: 2.5; .025 TABLET ORAL at 12:13

## 2021-07-01 RX ADMIN — POTASSIUM CHLORIDE 40 MEQ: 1500 TABLET, EXTENDED RELEASE ORAL at 06:41

## 2021-07-01 RX ADMIN — ATORVASTATIN CALCIUM 40 MG: 40 TABLET, FILM COATED ORAL at 17:38

## 2021-07-01 ASSESSMENT — ENCOUNTER SYMPTOMS
HEARTBURN: 0
WEAKNESS: 1
COUGH: 0
ABDOMINAL PAIN: 1
MYALGIAS: 0
CHILLS: 0
FEVER: 0
DEPRESSION: 0
DIZZINESS: 0
DIARRHEA: 1
NERVOUS/ANXIOUS: 0
SPEECH CHANGE: 0
FOCAL WEAKNESS: 0
VOMITING: 0
NAUSEA: 0
BLURRED VISION: 0
SHORTNESS OF BREATH: 0
BLOOD IN STOOL: 0

## 2021-07-01 ASSESSMENT — PAIN DESCRIPTION - PAIN TYPE
TYPE: SURGICAL PAIN
TYPE: ACUTE PAIN
TYPE: SURGICAL PAIN
TYPE: ACUTE PAIN
TYPE: SURGICAL PAIN
TYPE: SURGICAL PAIN

## 2021-07-01 NOTE — CARE PLAN
The patient is Stable - Low risk of patient condition declining or worsening    Shift Goals  Clinical Goals: q2h turns and incontinence checks to improve skin integrity   Patient Goals: pain control  Family Goals: n/a    Progress made toward(s) clinical / shift goals:  patient is compliant with Q2h turns and calls for incontinence episodes.     Patient is not progressing towards the following goals: patient continues to have loose bowel movements that come out around BMS, discussed with MD. PRN ordered to help with stool consistency.   Problem: Knowledge Deficit - Standard  Goal: Patient and family/care givers will demonstrate understanding of plan of care, disease process/condition, diagnostic tests and medications  Outcome: Progressing     Problem: Psychosocial  Goal: Patient's ability to identify and develop effective coping behaviors will improve  Outcome: Progressing         Problem: Bowel Elimination  Goal: Establish and maintain regular bowel function  Outcome: Not Progressing

## 2021-07-01 NOTE — PROGRESS NOTES
[facility] Medicine Daily Progress Note    Date of Service  7/1/2021    Chief Complaint  56 y.o. male admitted 2/9/2021 with Syncope.    Hospital Course  Admitted with syncope, he was noted to be bradycardic and hypotensive.  He has known history of permanent atrial fibrillation and he was on diltiazem, digoxin and metoprolol.  These medications were held.  Later on he was restarted on low-dose Toprol and Digoxin.  He was also started on anticoagulation with Eliquis.  He also had a possible history of adrenal insufficiency, he was already on Florinef and midodrine.  Midodrine was discontinued and he was started on cortisol. Further work-up showed that he had gram-negative bacteremia as well as urinary tract infection and acute kidney injury.  He was initially placed on IV Zosyn, later changed to oral Bactrim to finish the course.  He was carefully hydrated with IV fluids.  On admission he also expressed suicidal ideation.  Psychiatry was consulted the case, he was placed on a legal hold.  He was noted to have severe depression.  He has been placed on medication the form of Geodon, Paxil, Klonopin.  He has a colostomy in place, which the patient believes contributes to his severe depression because of ostomy care.  He had history of bowel perforation and splenic fluid collection requiring Segmental colectomy, colostomy creation, mobilization of splenic flexure, wound VAC placement, irrigation and debridement of flank wound on 11/10/2020.  Surgery was consulted on the case, and he underwent exploratory laparotomy and colostomy takedown and closure on 5/27/2021.  Wound VAC was later placed.  Psychiatry continued to follow the patient, his legal hold was discontinued on 6/4/2021    Interval Problem Update  Ab wound-wound vac is doing well.      Dirrhea-remains about the same. Has not taken any lomotil, BMS remains in place, liquid brown stool.     Consultants/Specialty  Cardiology  Psychiatry  Surgery    Code Status  Full  Code    Disposition  SNF     Review of Systems  Review of Systems   Constitutional: Negative for chills, fever and malaise/fatigue.   Eyes: Negative for blurred vision.   Respiratory: Negative for cough and shortness of breath.    Cardiovascular: Negative for chest pain.   Gastrointestinal: Positive for abdominal pain (minimal) and diarrhea (no clear changes). Negative for blood in stool, heartburn, melena, nausea and vomiting.   Genitourinary: Negative for dysuria.   Musculoskeletal: Negative for myalgias.   Skin: Negative for rash.   Neurological: Positive for weakness. Negative for dizziness, speech change and focal weakness.   Psychiatric/Behavioral: Negative for depression. The patient is not nervous/anxious.    All other systems reviewed and are negative.     Physical Exam  Temp:  [36.2 °C (97.1 °F)-36.6 °C (97.8 °F)] 36.6 °C (97.8 °F)  Pulse:  [60-76] 76  Resp:  [18] 18  BP: (103-142)/(67-89) 142/88  SpO2:  [94 %-98 %] 98 %    Physical Exam  Vitals and nursing note reviewed.   Constitutional:       Appearance: He is obese.      Comments: Weak appearing  'Pale  More bright appearing today   HENT:      Head: Normocephalic and atraumatic.      Nose: No congestion.      Mouth/Throat:      Mouth: Mucous membranes are moist.   Eyes:      Extraocular Movements: Extraocular movements intact.      Conjunctiva/sclera: Conjunctivae normal.      Pupils: Pupils are equal, round, and reactive to light.   Cardiovascular:      Rate and Rhythm: Normal rate. Rhythm irregularly irregular.   Pulmonary:      Effort: Pulmonary effort is normal.      Breath sounds: Normal breath sounds.   Abdominal:      General: There is no distension.      Tenderness: There is abdominal tenderness. There is no guarding or rebound.      Comments: Wound vac  Body mass index is 50.08 kg/m²   Genitourinary:     Comments: BMS in place, liquid brown stool observed, no clear changes noted today  Musculoskeletal:      Cervical back: No muscular  tenderness.      Right lower leg: Edema present.      Left lower leg: Edema present.   Skin:     General: Skin is warm and dry.      Coloration: Skin is pale.   Neurological:      General: No focal deficit present.      Mental Status: He is alert and oriented to person, place, and time.      Cranial Nerves: No cranial nerve deficit.   Psychiatric:         Mood and Affect: Mood is not anxious or depressed.         Thought Content: Thought content is not paranoid or delusional. Thought content does not include homicidal or suicidal ideation. Thought content does not include homicidal or suicidal plan.       Fluids    Intake/Output Summary (Last 24 hours) at 7/1/2021 1147  Last data filed at 7/1/2021 1120  Gross per 24 hour   Intake 760 ml   Output 2175 ml   Net -1415 ml       Laboratory  Recent Labs     06/29/21  0601   WBC 10.2   RBC 3.76*   HEMOGLOBIN 10.5*   HEMATOCRIT 34.4*   MCV 91.5   MCH 27.9   MCHC 30.5*   RDW 52.8*   PLATELETCT 248   MPV 11.7     Recent Labs     06/29/21  0601 07/01/21  0329   SODIUM 135 137   POTASSIUM 3.4* 3.9   CHLORIDE 104 107   CO2 22 22   GLUCOSE 104* 113*   BUN 24* 23*   CREATININE 1.30 1.26   CALCIUM 8.7 8.3*                   Imaging  CT-ABDOMEN-PELVIS W/O   Final Result         1. Liquid stool in the distal colon, consistent with diarrhea.   2. Unchanged perirectal fat stranding, sequela of nonspecific proctitis.   3. Cholelithiasis. No cholecystitis.   4. Decreased blood pool attenuation, suggesting anemia.   5. Chronic T11 and T12 compression fractures.      US-RENAL   Final Result      1.  Low-level echogenic debris in the urinary bladder is noted which could indicate UTI or blood within the bladder.      2.  No hydronephrosis.      IR-MIDLINE CATHETER INSERTION WO GUIDANCE > AGE 3   Final Result                  Ultrasound-guided midline placement performed by qualified nursing staff    as above.          EC-ECHOCARDIOGRAM COMPLETE W/ CONT   Final Result      DX-CHEST-PORTABLE  (1 VIEW)   Final Result      1.  Minor asymmetric interstitial opacity in the right lower lobe which could represent minor interstitial edema, pneumonia, or fibrotic change.      2.  No left lung consolidation.      3.  No evidence of congestive failure.      LZ-OMCYXWW-0 VIEW   Final Result         No specific finding to suggest small bowel obstruction.      MA-VAOBTLE-5 VIEW   Final Result         No significant interval change.      DX-CHEST-LIMITED (1 VIEW)   Final Result         Diffuse interstitial prominence could relate to mild pulmonary edema or atypical infection      PD-[id number]-1 VIEW   Final Result      Increased colonic gas without definite bowel obstruction.      US-RENAL   Final Result      No evidence of hydronephrosis.      Lobulated kidneys bilaterally.      CT-ABDOMEN-PELVIS WITH   Final Result      1.  There is no evidence of small bowel obstruction.   2.  There is a left lower quadrant ostomy.   3.  There is fluid distention of the colon distal to the surgical material in the left mid descending colon extending all the way to the rectum. There is no pneumatosis or free air.   4.  There is cholelithiasis without biliary dilatation.   5.  There has been interval removal of the drainage catheter anterior to the spleen with minimal hypodense area in the anterior spleen again noted. There is no new fluid collection.      IR-US GUIDED PIV   Final Result    Ultrasound-guided PERIPHERAL IV INSERTION performed by    qualified nursing staff as above.      EC-ECHOCARDIOGRAM COMPLETE W/O CONT   Final Result      DX-LUMBAR SPINE-2 OR 3 VIEWS   Final Result      Moderate compression deformity of T11 is new compared to 2018.      Mild wedge deformity of T12 is unchanged.      Degenerative changes including facet arthropathy.      Mild retrolisthesis of L5 on S1 and L3 on L4.              Assessment/Plan  Diarrhea- (present on admission)  Assessment & Plan  C diff negative  Check stool wbc and  culture-negative  Fecal lactoferrin positive, indicating presence of fecal leukocytes (had prior bowel surgery, doubtful it is IBD), monitor  -check celiac sprue panel  Check CT abd/pelvis, negative for acute findings  -will trial low dose lomotil, BMS in place  -will add metamucil bulking agent  -unclear etiology  -check fecal fat    Normocytic anemia- (present on admission)  Assessment & Plan  Follow cbc    Urinary tract infection- (present on admission)  Assessment & Plan  Completed 10-day course of Bactrim    Leukocytosis- (present on admission)  Assessment & Plan  Follow cbc    Obstructive uropathy- (present on admission)  Assessment & Plan  resolved    Gram-negative bacteremia- (present on admission)  Assessment & Plan  Completed 10-day course of Bactrim    Permanent atrial fibrillation (HCC)- (present on admission)  Assessment & Plan  Metoprolol, Eliquis    Chronic venous insufficiency of lower extremity- (present on admission)  Assessment & Plan  compression stockings.    Chronic heart failure with preserved ejection fraction (HCC)- (present on admission)  Assessment & Plan  Metoprolol, Lasix  Fluid restriction of 1.5 L per day      Type 2 diabetes mellitus with hyperglycemia, with long-term current use of insulin (HCC)- (present on admission)  Assessment & Plan  Follow bmp    Major depressive disorder, recurrent, severe with psychotic features (HCC)- (present on admission)  Assessment & Plan  Geodon, Paxil, Klonopin    Colostomy present (HCC)- (present on admission)  Assessment & Plan  Exploratory laparotomy with colostomy takedown and closure 5/27/2021, no longer present        Hypothyroidism- (present on admission)  Assessment & Plan  Increased Levothyroxine to 125 mcg      SRUTHI (acute kidney injury) (HCC)- (present on admission)  Assessment & Plan  Resolved    Adrenal insufficiency (HCC)- (present on admission)  Assessment & Plan  Cortef 5 mg BID  Plan to decrease to daily on 7/4/2021    Debility-  (present on admission)  Assessment & Plan  PT and OT    Suicidal ideation- (present on admission)  Assessment & Plan  Legal hold discontinued 6/4/2021    Orthostatic hypotension- (present on admission)  Assessment & Plan  off Florinef.    Mixed hyperlipidemia- (present on admission)  Assessment & Plan  Atorvastatin.     Lower limb amputation, great toe (HCC)- (present on admission)  Assessment & Plan  Monitor  No signs of infection     Hypomagnesemia- (present on admission)  Assessment & Plan  IV Mg 2 g  Follow level    Obesity due to excess calories with serious comorbidity- (present on admission)  Assessment & Plan  Body mass index is 50.08 kg/m².    Hypokalemia- (present on admission)  Assessment & Plan  Kdur  Follow bmp       VTE prophylaxis: Lovenox

## 2021-07-01 NOTE — PROGRESS NOTES
Assumed care of pt this morning, pt is A&O x4 and is on room air. Pt reports adequate pain control with current pain regimen. Q2h turns in place. BMS in place sacrum continues to be excoriated due to leakage around BMS. Wound vac to mid abdomen, vac function assessed. Pt encouraged to sit edge of bed today. Call light is within reach. Pt updated on current plan of care. Bed is locked and in the lowest position. Hourly rounding in place.

## 2021-07-01 NOTE — PROGRESS NOTES
2 RN SKIN CHECK      BLE have 2+ pitting edema  Dependent edema on BUE   Wound vac on abdomen is CDI, running appropriately w/ no leaks detected. Serosanguinous drainage. M, W, F, wound team.   PIV RUE SL  Redness/moisture in pannus, interdry in place   Perioneum and sacral redness observed               Barrier paste applied around BMS, sacrum, inner thighs/perineal area after change of linens   Foreskin cleaned, penis is red/skin irritated and moist                Site cleansed and dried  Sacrum/back are red but blanchable   Q2 turns in place   Low air loss bed in use, waffle overlay on   Appropriate skin breakdown preventions in use   All needs met at this time

## 2021-07-01 NOTE — CARE PLAN
The patient is Stable - Low risk of patient condition declining or worsening    Shift Goals  Clinical Goals: Skin integrity will improve   Patient Goals: pain control and skin integrity   Family Goals: n/a    Progress made toward(s) clinical / shift goals:  Q2 turns in place, barrier wipes and paste in use, BMS leaking w/ frequent linen changes and skin checks in place, interdry in pannus due to moisture and redness.       Problem: Knowledge Deficit - Standard  Goal: Patient and family/care givers will demonstrate understanding of plan of care, disease process/condition, diagnostic tests and medications  Outcome: Progressing     Problem: Provide Safe Environment  Goal: Suicide environmental safety, protocols, policies, and practices will be implemented  Outcome: Progressing     Problem: Fall Risk  Goal: Patient will remain free from falls  Outcome: Progressing     Problem: Pain - Standard  Goal: Alleviation of pain or a reduction in pain to the patient’s comfort goal  Outcome: Progressing

## 2021-07-01 NOTE — DISCHARGE PLANNING
Agency/Facility Name: [facility]  Outcome: Left Vmail regarding an update on bed availability     Agency/Facility Name: [facility]  Outcome:  Left Vmail regarding an update on bed availability     1607  Agency/Facility Name: [facility]  Spoke To: Sathish  Outcome: Per Sathish requested a call back for bed availability on Monday

## 2021-07-01 NOTE — PROGRESS NOTES
Pt is A+Ox4  Complained of 7/10 pain, medicated per MAR   VSS, room air   BMS in place. +watery/brown output. +leak, wound team aware   +void   Tolerating a low fiber GI soft diet               1500mL fluid restriction               Denies N/V. +diarrhea   Max assist. Pt is very weak, Q2 turns in place   See skin note for assessment               Wound vac, SCDs, skin breakdown, pannus redness, BLE edema, redness/whitening skin around penis, inner thighs red/excoriated, interdry in pannus   Pt denied thoughts of hurting self or others   Pt updated on POC, approriate fall precautions in place, bed alarm on, all needs met at this time   Hourly rounding

## 2021-07-02 PROCEDURE — 700102 HCHG RX REV CODE 250 W/ 637 OVERRIDE(OP): Performed by: INTERNAL MEDICINE

## 2021-07-02 PROCEDURE — A9270 NON-COVERED ITEM OR SERVICE: HCPCS | Performed by: INTERNAL MEDICINE

## 2021-07-02 PROCEDURE — 97530 THERAPEUTIC ACTIVITIES: CPT

## 2021-07-02 PROCEDURE — 97116 GAIT TRAINING THERAPY: CPT

## 2021-07-02 PROCEDURE — A9270 NON-COVERED ITEM OR SERVICE: HCPCS | Performed by: FAMILY MEDICINE

## 2021-07-02 PROCEDURE — 770001 HCHG ROOM/CARE - MED/SURG/GYN PRIV*

## 2021-07-02 PROCEDURE — 97606 NEG PRS WND THER DME>50 SQCM: CPT

## 2021-07-02 PROCEDURE — 302146: Performed by: INTERNAL MEDICINE

## 2021-07-02 PROCEDURE — 99232 SBSQ HOSP IP/OBS MODERATE 35: CPT | Performed by: INTERNAL MEDICINE

## 2021-07-02 PROCEDURE — 700102 HCHG RX REV CODE 250 W/ 637 OVERRIDE(OP): Performed by: FAMILY MEDICINE

## 2021-07-02 RX ORDER — DIPHENOXYLATE HYDROCHLORIDE AND ATROPINE SULFATE 2.5; .025 MG/1; MG/1
1 TABLET ORAL 2 TIMES DAILY
Status: COMPLETED | OUTPATIENT
Start: 2021-07-02 | End: 2021-07-06

## 2021-07-02 RX ADMIN — OMEPRAZOLE 40 MG: 20 CAPSULE, DELAYED RELEASE ORAL at 17:50

## 2021-07-02 RX ADMIN — OMEPRAZOLE 40 MG: 20 CAPSULE, DELAYED RELEASE ORAL at 06:02

## 2021-07-02 RX ADMIN — HYDROCORTISONE 5 MG: 5 TABLET ORAL at 14:29

## 2021-07-02 RX ADMIN — APIXABAN 5 MG: 5 TABLET, FILM COATED ORAL at 06:03

## 2021-07-02 RX ADMIN — TAMSULOSIN HYDROCHLORIDE 0.4 MG: 0.4 CAPSULE ORAL at 07:48

## 2021-07-02 RX ADMIN — APIXABAN 5 MG: 5 TABLET, FILM COATED ORAL at 17:48

## 2021-07-02 RX ADMIN — CLONAZEPAM 1 MG: 1 TABLET ORAL at 17:48

## 2021-07-02 RX ADMIN — POTASSIUM CHLORIDE 40 MEQ: 1500 TABLET, EXTENDED RELEASE ORAL at 06:02

## 2021-07-02 RX ADMIN — LEVOTHYROXINE SODIUM 125 MCG: 0.12 TABLET ORAL at 06:02

## 2021-07-02 RX ADMIN — PAROXETINE HYDROCHLORIDE 20 MG: 20 TABLET, FILM COATED ORAL at 07:48

## 2021-07-02 RX ADMIN — DIPHENOXYLATE HYDROCHLORIDE AND ATROPINE SULFATE 1 TABLET: 2.5; .025 TABLET ORAL at 17:47

## 2021-07-02 RX ADMIN — CALCIUM POLYCARBOPHIL 625 MG: 625 TABLET, FILM COATED ORAL at 07:48

## 2021-07-02 RX ADMIN — ZIPRASIDONE HYDROCHLORIDE 60 MG: 60 CAPSULE ORAL at 09:20

## 2021-07-02 RX ADMIN — AMIODARONE HYDROCHLORIDE 200 MG: 200 TABLET ORAL at 06:02

## 2021-07-02 RX ADMIN — OXYCODONE 10 MG: 5 TABLET ORAL at 23:44

## 2021-07-02 RX ADMIN — AMIODARONE HYDROCHLORIDE 200 MG: 200 TABLET ORAL at 17:49

## 2021-07-02 RX ADMIN — METOPROLOL TARTRATE 25 MG: 25 TABLET, FILM COATED ORAL at 17:48

## 2021-07-02 RX ADMIN — CLONAZEPAM 0.5 MG: 0.5 TABLET ORAL at 07:48

## 2021-07-02 RX ADMIN — Medication 1000 UNITS: at 06:02

## 2021-07-02 RX ADMIN — OXYCODONE 10 MG: 5 TABLET ORAL at 06:02

## 2021-07-02 RX ADMIN — ZIPRASIDONE HYDROCHLORIDE 60 MG: 60 CAPSULE ORAL at 19:45

## 2021-07-02 RX ADMIN — OXYCODONE 10 MG: 5 TABLET ORAL at 18:22

## 2021-07-02 RX ADMIN — DIPHENOXYLATE HYDROCHLORIDE AND ATROPINE SULFATE 1 TABLET: 2.5; .025 TABLET ORAL at 12:58

## 2021-07-02 RX ADMIN — OXYCODONE 10 MG: 5 TABLET ORAL at 15:34

## 2021-07-02 RX ADMIN — POTASSIUM CHLORIDE 40 MEQ: 1500 TABLET, EXTENDED RELEASE ORAL at 17:51

## 2021-07-02 RX ADMIN — OXYCODONE 10 MG: 5 TABLET ORAL at 10:43

## 2021-07-02 RX ADMIN — CALCIUM POLYCARBOPHIL 625 MG: 625 TABLET, FILM COATED ORAL at 17:50

## 2021-07-02 RX ADMIN — HYDROCORTISONE 5 MG: 5 TABLET ORAL at 06:03

## 2021-07-02 RX ADMIN — METOPROLOL TARTRATE 25 MG: 25 TABLET, FILM COATED ORAL at 06:02

## 2021-07-02 RX ADMIN — ATORVASTATIN CALCIUM 40 MG: 40 TABLET, FILM COATED ORAL at 17:49

## 2021-07-02 ASSESSMENT — ENCOUNTER SYMPTOMS
DEPRESSION: 0
SHORTNESS OF BREATH: 0
NERVOUS/ANXIOUS: 0
FEVER: 0
ABDOMINAL PAIN: 1
MYALGIAS: 0
BLOOD IN STOOL: 0
COUGH: 0
VOMITING: 0
WEAKNESS: 1
CHILLS: 0
FOCAL WEAKNESS: 0
BLURRED VISION: 0
NAUSEA: 0
DIARRHEA: 1
SPEECH CHANGE: 0
DIZZINESS: 0
HEARTBURN: 0

## 2021-07-02 ASSESSMENT — PAIN DESCRIPTION - PAIN TYPE
TYPE: ACUTE PAIN

## 2021-07-02 ASSESSMENT — COGNITIVE AND FUNCTIONAL STATUS - GENERAL
MOVING FROM LYING ON BACK TO SITTING ON SIDE OF FLAT BED: UNABLE
MOVING TO AND FROM BED TO CHAIR: UNABLE
MOBILITY SCORE: 7
SUGGESTED CMS G CODE MODIFIER MOBILITY: CM
STANDING UP FROM CHAIR USING ARMS: TOTAL
WALKING IN HOSPITAL ROOM: A LOT
TURNING FROM BACK TO SIDE WHILE IN FLAT BAD: UNABLE
CLIMB 3 TO 5 STEPS WITH RAILING: TOTAL

## 2021-07-02 ASSESSMENT — GAIT ASSESSMENTS
ASSISTIVE DEVICE: 4 WHEEL WALKER
DEVIATION: BRADYKINETIC;SHUFFLED GAIT
GAIT LEVEL OF ASSIST: MINIMAL ASSIST
DISTANCE (FEET): 5

## 2021-07-02 NOTE — PROGRESS NOTES
Assumed care of patient at bedside report from NOC RN. Updated on POC. Patient currently sleeping without signs or symptoms of discomfort or distress; on room air; up max assist. Call light within reach. Whiteboard updated. Fall precautions in place. Bed locked and in lowest position. No other needs indicated at this time.

## 2021-07-02 NOTE — PROGRESS NOTES
4 Eyes Skin Assessment Completed by ELISA Esqueda and ELISA Hall.     Head WDL  Ears WDL  Nose WDL  Mouth WDL  Neck WDL  Breast/Chest WDL  Shoulder Blades WDL  Spine WDL  (R) Arm/Elbow/Hand Redness, blanching  (L) Arm/Elbow/Hand Redness, blanching  Abdomen Incision, wound vac  Groin Excoriation, red, fragile   Scrotum/Coccyx/Buttocks Redness, excoriation, fragile   (R) Leg Edema  (L) Leg Edema  (R) Heel/Foot/Toe Redness, blanching, right great toe missing, dry, flaky   (L) Heel/Foot/Toe Redness, blanching, dry, flaky    Devices In Places SCD's, wound vac, rectal tube       Interventions In Place InterDry, Heel Mepilex, Pillows, Elbow Mepilex, Barrier Cream, Dri-Teja Pads and Heels Loaded W/Pillows

## 2021-07-02 NOTE — CARE PLAN
The patient is Stable - Low risk of patient condition declining or worsening    Shift Goals  Clinical Goals: maintain skin integrity  Patient Goals: pain control  Family Goals: n/a    Progress made toward(s) clinical / shift goals:      Patient is not progressing towards the following goals:    Problem: Pain - Standard  Goal: Alleviation of pain or a reduction in pain to the patient’s comfort goal  Outcome: Progressing     Problem: Fall Risk  Goal: Patient will remain free from falls  Outcome: Progressing

## 2021-07-02 NOTE — PROGRESS NOTES
[facility] Medicine Daily Progress Note    Date of Service  7/2/2021    Chief Complaint  56 y.o. male admitted 2/9/2021 with Syncope.    Hospital Course  Admitted with syncope, he was noted to be bradycardic and hypotensive.  He has known history of permanent atrial fibrillation and he was on diltiazem, digoxin and metoprolol.  These medications were held.  Later on he was restarted on low-dose Toprol and Digoxin.  He was also started on anticoagulation with Eliquis.  He also had a possible history of adrenal insufficiency, he was already on Florinef and midodrine.  Midodrine was discontinued and he was started on cortisol. Further work-up showed that he had gram-negative bacteremia as well as urinary tract infection and acute kidney injury.  He was initially placed on IV Zosyn, later changed to oral Bactrim to finish the course.  He was carefully hydrated with IV fluids.  On admission he also expressed suicidal ideation.  Psychiatry was consulted the case, he was placed on a legal hold.  He was noted to have severe depression.  He has been placed on medication the form of Geodon, Paxil, Klonopin.  He has a colostomy in place, which the patient believes contributes to his severe depression because of ostomy care.  He had history of bowel perforation and splenic fluid collection requiring Segmental colectomy, colostomy creation, mobilization of splenic flexure, wound VAC placement, irrigation and debridement of flank wound on 11/10/2020.  Surgery was consulted on the case, and he underwent exploratory laparotomy and colostomy takedown and closure on 5/27/2021.  Wound VAC was later placed.  Psychiatry continued to follow the patient, his legal hold was discontinued on 6/4/2021    Interval Problem Update  Ab wound-no issues, no leakage.      Dirrhea-multiple accidents last night. BMS remains in place    Consultants/Specialty  Cardiology  Psychiatry  Surgery    Code Status  Full Code    Disposition  SNF     Review of  Systems  Review of Systems   Constitutional: Negative for chills and fever.   Eyes: Negative for blurred vision.   Respiratory: Negative for cough and shortness of breath.    Cardiovascular: Negative for chest pain.   Gastrointestinal: Positive for abdominal pain (nearly absent) and diarrhea (no clear improvment noted). Negative for blood in stool, heartburn, melena, nausea and vomiting.   Genitourinary: Negative for dysuria.   Musculoskeletal: Negative for myalgias.   Neurological: Positive for weakness. Negative for dizziness, speech change and focal weakness.   Psychiatric/Behavioral: Negative for depression. The patient is not nervous/anxious.    All other systems reviewed and are negative.     Physical Exam  Temp:  [35.8 °C (96.5 °F)-36.6 °C (97.9 °F)] 35.8 °C (96.5 °F)  Pulse:  [68-89] 70  Resp:  [16-18] 17  BP: ()/(53-74) 98/53  SpO2:  [95 %-97 %] 95 %    Physical Exam  Vitals and nursing note reviewed.   Constitutional:       Appearance: He is obese.      Comments: Weak appearing  'Pale  More bright appearing today and watching Mirna   HENT:      Head: Normocephalic and atraumatic.      Nose: No congestion.      Mouth/Throat:      Mouth: Mucous membranes are moist.   Eyes:      Extraocular Movements: Extraocular movements intact.      Conjunctiva/sclera: Conjunctivae normal.      Pupils: Pupils are equal, round, and reactive to light.   Cardiovascular:      Rate and Rhythm: Normal rate. Rhythm irregularly irregular.   Pulmonary:      Effort: Pulmonary effort is normal.      Breath sounds: Normal breath sounds.   Abdominal:      General: There is no distension.      Tenderness: There is abdominal tenderness.      Comments: Wound vac-good seal observed  Body mass index is 50.08 kg/m²   Genitourinary:     Comments: BMS in place, liquid brown stool observed, remains without interval changes today  Musculoskeletal:      Cervical back: No muscular tenderness.      Right lower leg: Edema present.      Left  lower leg: Edema present.   Skin:     General: Skin is warm and dry.      Coloration: Skin is pale.   Neurological:      General: No focal deficit present.      Mental Status: He is alert and oriented to person, place, and time.      Cranial Nerves: No cranial nerve deficit.   Psychiatric:         Mood and Affect: Mood is not anxious or depressed.         Thought Content: Thought content is not paranoid or delusional. Thought content does not include homicidal or suicidal ideation. Thought content does not include homicidal or suicidal plan.       Fluids    Intake/Output Summary (Last 24 hours) at 7/2/2021 1440  Last data filed at 7/2/2021 0829  Gross per 24 hour   Intake 480 ml   Output 1420 ml   Net -940 ml       Laboratory      Recent Labs     07/01/21  0329   SODIUM 137   POTASSIUM 3.9   CHLORIDE 107   CO2 22   GLUCOSE 113*   BUN 23*   CREATININE 1.26   CALCIUM 8.3*                   Imaging  CT-ABDOMEN-PELVIS W/O   Final Result         1. Liquid stool in the distal colon, consistent with diarrhea.   2. Unchanged perirectal fat stranding, sequela of nonspecific proctitis.   3. Cholelithiasis. No cholecystitis.   4. Decreased blood pool attenuation, suggesting anemia.   5. Chronic T11 and T12 compression fractures.      US-RENAL   Final Result      1.  Low-level echogenic debris in the urinary bladder is noted which could indicate UTI or blood within the bladder.      2.  No hydronephrosis.      IR-MIDLINE CATHETER INSERTION WO GUIDANCE > AGE 3   Final Result                  Ultrasound-guided midline placement performed by qualified nursing staff    as above.          EC-ECHOCARDIOGRAM COMPLETE W/ CONT   Final Result      DX-CHEST-PORTABLE (1 VIEW)   Final Result      1.  Minor asymmetric interstitial opacity in the right lower lobe which could represent minor interstitial edema, pneumonia, or fibrotic change.      2.  No left lung consolidation.      3.  No evidence of congestive failure.      [id number] VIEW    Final Result         No specific finding to suggest small bowel obstruction.      [id number] VIEW   Final Result         No significant interval change.      DX-CHEST-LIMITED (1 VIEW)   Final Result         Diffuse interstitial prominence could relate to mild pulmonary edema or atypical infection      [id number] VIEW   Final Result      Increased colonic gas without definite bowel obstruction.      US-RENAL   Final Result      No evidence of hydronephrosis.      Lobulated kidneys bilaterally.      CT-ABDOMEN-PELVIS WITH   Final Result      1.  There is no evidence of small bowel obstruction.   2.  There is a left lower quadrant ostomy.   3.  There is fluid distention of the colon distal to the surgical material in the left mid descending colon extending all the way to the rectum. There is no pneumatosis or free air.   4.  There is cholelithiasis without biliary dilatation.   5.  There has been interval removal of the drainage catheter anterior to the spleen with minimal hypodense area in the anterior spleen again noted. There is no new fluid collection.      IR-US GUIDED PIV   Final Result    Ultrasound-guided PERIPHERAL IV INSERTION performed by    qualified nursing staff as above.      EC-ECHOCARDIOGRAM COMPLETE W/O CONT   Final Result      DX-LUMBAR SPINE-2 OR 3 VIEWS   Final Result      Moderate compression deformity of T11 is new compared to 2018.      Mild wedge deformity of T12 is unchanged.      Degenerative changes including facet arthropathy.      Mild retrolisthesis of L5 on S1 and L3 on L4.              Assessment/Plan  Diarrhea- (present on admission)  Assessment & Plan  C diff negative  Check stool wbc and culture-negative  Fecal lactoferrin positive, indicating presence of fecal leukocytes (had prior bowel surgery, doubtful it is IBD), monitor  -check celiac sprue panel-pending  Check CT abd/pelvis, negative for acute findings  -will trial low dose lomotil (will schedule for short period), BMS  in place  -will add metamucil bulking agent and now on fibercon scheduled  -unclear etiology  -check fecal fat-pending    Normocytic anemia- (present on admission)  Assessment & Plan  Follow cbc    Urinary tract infection- (present on admission)  Assessment & Plan  Completed 10-day course of Bactrim    Leukocytosis- (present on admission)  Assessment & Plan  Follow cbc    Obstructive uropathy- (present on admission)  Assessment & Plan  resolved    Gram-negative bacteremia- (present on admission)  Assessment & Plan  Completed 10-day course of Bactrim    Permanent atrial fibrillation (HCC)- (present on admission)  Assessment & Plan  Metoprolol, Eliquis    Chronic venous insufficiency of lower extremity- (present on admission)  Assessment & Plan  compression stockings.    Chronic heart failure with preserved ejection fraction (HCC)- (present on admission)  Assessment & Plan  Metoprolol, Lasix  Fluid restriction of 1.5 L per day      Type 2 diabetes mellitus with hyperglycemia, with long-term current use of insulin (HCC)- (present on admission)  Assessment & Plan  Follow bmp    Major depressive disorder, recurrent, severe with psychotic features (HCC)- (present on admission)  Assessment & Plan  Geodon, Paxil, Klonopin    Colostomy present (HCC)- (present on admission)  Assessment & Plan  Exploratory laparotomy with colostomy takedown and closure 5/27/2021, no longer present        Hypothyroidism- (present on admission)  Assessment & Plan  Increased Levothyroxine to 125 mcg      SRUTHI (acute kidney injury) (HCC)- (present on admission)  Assessment & Plan  Resolved    Adrenal insufficiency (HCC)- (present on admission)  Assessment & Plan  Cortef 5 mg BID  Plan to decrease to daily on 7/4/2021    Debility- (present on admission)  Assessment & Plan  PT and OT    Suicidal ideation- (present on admission)  Assessment & Plan  Legal hold discontinued 6/4/2021    Orthostatic hypotension- (present on admission)  Assessment &  Plan  off Florinef.    Mixed hyperlipidemia- (present on admission)  Assessment & Plan  Atorvastatin.     Lower limb amputation, great toe (HCC)- (present on admission)  Assessment & Plan  Monitor  No signs of infection     Hypomagnesemia- (present on admission)  Assessment & Plan  IV Mg 2 g  Follow level    Obesity due to excess calories with serious comorbidity- (present on admission)  Assessment & Plan  Body mass index is 50.08 kg/m².    Hypokalemia- (present on admission)  Assessment & Plan  Kdur  Follow bmp       VTE prophylaxis: Lovenox

## 2021-07-02 NOTE — THERAPY
Physical Therapy   Daily Treatment     Patient Name: Sebastián Castro  Age:  56 y.o., Sex:  male  Medical Record #: [id number]  Today's Date: 7/2/2021     Precautions:  Fall Risk    Assessment  Pt continues to slowly progress towards therapy goals with mild improvements in mobility and activity tolerance. Continues to be limited by fatigue and bowel incontinence. Pt demos bed mobility MAX assist, STS transfers EOB MODA, gait CGA EOB with 4ww lateral steps EOB then forward/backward steps. No LOB. 6 STS total with static stands. BM incontinence needing pericare and bedding change. Due to patient's low activity tolerance, unsafe to attempt chair transfer at this time as he would likely be too fatigued to safely return to bed. Would continue to benefit from skilled PT as he is at risk for falls and functional decline. Will progress POC as tolerated.     Plan    Continue current treatment plan.    DC Equipment Recommendations:  Unable to determine at this time  Discharge Recommendations:  Recommend post-acute placement for additional physical therapy services prior to discharge home      Subjective  Pt asleep upon arrival woke to verbal and agreeable to PT reporting no pain at rest.      Objective  Pt demos bed mobility MAX assist, STS transfers EOB MODA, gait CGA EOB with 4ww lateral steps EOB then forward/backward steps. No LOB. 6 STS total with static stands. BM incontinence needing pericare and bedding change.      07/02/21 1035   Total Time Spent   Total Time Spent (Mins) 40   Charge Group   Charges  Yes   PT Gait Training 1   PT Therapeutic Activities 2   Precautions   Precautions Fall Risk   Comments wound vac to abdomen and BMS   Pain 0 - 10 Group   Therapist Pain Assessment Post Activity  (back pain post PT)   Non Verbal Descriptors   Non Verbal Scale  Calm;Sleeping   Cognition    Level of Consciousness Alert   Comments Pleasant, agreeable   Other Treatments   Other Treatments Provided Multiple STS EOB and bedding  change with pericare   Balance   Sitting Balance (Static) Fair   Sitting Balance (Dynamic) Fair   Standing Balance (Static) Fair   Standing Balance (Dynamic) Fair -   Weight Shift Sitting Fair   Weight Shift Standing Poor   Skilled Intervention Verbal Cuing;Postural Facilitation;Sequencing   Comments 4WW in standing   Gait Analysis   Gait Level Of Assist Minimal Assist  (CGA)   Assistive Device 4 Wheel Walker   Distance (Feet) 5   # of Times Distance was Traveled 1   Deviation Bradykinetic;Shuffled Gait   # of Stairs Climbed 0   Skilled Intervention Verbal Cuing;Sequencing;Postural Facilitation   Comments 4WW side steps EOB, then front back steps EOB   Bed Mobility    Supine to Sit Maximal Assist   Sit to Supine Maximal Assist   Scooting Maximal Assist   Rolling Maximal Assist to Rt.   Skilled Intervention Verbal Cuing;Tactile Cuing;Postural Facilitation;Sequencing   Comments Initiates but cannot complete, chux used   Functional Mobility   Sit to Stand Moderate Assist   Bed, Chair, Wheelchair Transfer Unable to Participate   Transfer Method Stand Step   Mobility 6 STS total EOB, steps EOB with 4ww   Skilled Intervention Verbal Cuing;Tactile Cuing;Postural Facilitation   How much difficulty does the patient currently have...   Turning over in bed (including adjusting bedclothes, sheets and blankets)? 1   Sitting down on and standing up from a chair with arms (e.g., wheelchair, bedside commode, etc.) 1   Moving from lying on back to sitting on the side of the bed? 1   How much help from another person does the patient currently need...   Moving to and from a bed to a chair (including a wheelchair)? 1   Need to walk in a hospital room? 2   Climbing 3-5 steps with a railing? 1   6 clicks Mobility Score 7   Activity Tolerance   Sitting in Chair NT   Sitting Edge of Bed >30   Standing >5   Comments Fatigued after PT   Patient / Family Goals    Patient / Family Goal #1 walk again   Goal #1 Outcome Goal not met   Short Term  Goals    Short Term Goal # 1 Pt will perform supine<->sit with supv within 6 visits in order to return home   Goal Outcome # 1 goal not met   Short Term Goal # 2 Pt will transfer bed<->chair with 4WW with supv within 6 visits in order to return home   Goal Outcome # 2 Goal not met   Short Term Goal # 3 Pt will ambulate 100' with supv within 6 visits in order to return home   Goal Outcome # 3 Goal not met   Short Term Goal # 4 Pt will ascend/descend 5 steps with supv within 6 visits in order to enter/exit home   Goal Outcome # 4 Goal not met   Education Group   Education Provided Role of Physical Therapist   Role of Physical Therapist Patient Response Patient;Acceptance;Explanation;Verbal Demonstration   Anticipated Discharge Equipment and Recommendations   DC Equipment Recommendations Unable to determine at this time   Discharge Recommendations Recommend post-acute placement for additional physical therapy services prior to discharge home   Interdisciplinary Plan of Care Collaboration   IDT Collaboration with  Nursing   Patient Position at End of Therapy In Bed;Call Light within Reach;Tray Table within Reach;Phone within Reach   Collaboration Comments RN Updated   Session Information   Date / Session Number  7/2-7(2/3, 7/4)

## 2021-07-02 NOTE — PROGRESS NOTES
4 Eyes Skin Assessment Completed by ELISA Weinstein and ELISA Landrum.    Head WDL  Ears WDL  Nose WDL  Mouth WDL  Neck WDL  Breast/Chest WDL  Shoulder Blades WDL  Spine WDL  (R) Arm/Elbow/Hand Bruising  (L) Arm/Elbow/Hand Bruising  Abdomen Redness and Blanching  Groin Redness and Excoriation  Scrotum/Coccyx/Buttocks Redness, Non-Blanching, Excoriation and Moisture Fissure multiple moisture fissures on coccyx and around anus   (R) Leg Blanching, Bruising and Swelling  (L) Leg Blanching, Bruising and Swelling  (R) Heel/Foot/Toe Blanching  (L) Heel/Foot/Toe Blanching          Devices In Places SCD's and BMS  Wound vac         Interventions In Place InterDry, Heel Mepilex, Waffle Overlay, Pillows, Elbow Mepilex, Q2 Turns, Barrier Cream, Dri-Teja Pads, Heels Loaded W/Pillows and Pressure Redistribution Mattress    Possible Skin Injury No    Pictures Uploaded Into Epic No, needs to be completed  Wound Consult Placed N/A  RN Wound Prevention Protocol Ordered Yes

## 2021-07-02 NOTE — WOUND TEAM
[facility]  Inpatient Services  Wound and Skin Care Progress Note    Admission Date: 2/9/2021     Last order of IP CONSULT TO WOUND CARE was found on 3/18/2021 from Hospital Encounter on 2/9/2021     HPI, PMH, SH: Reviewed    Past Surgical History:   Procedure Laterality Date   • PB EXPLORATORY OF ABDOMEN  5/27/2021    Procedure: LAPAROTOMY, EXPLORATORY;  Surgeon: Kin Desouza D.O.;  Location: [facility];  Service: General   • COLOSTOMY TAKEDOWN  5/27/2021    Procedure: COLOSTOMY TAKEDOWN AND CLOSURE;  Surgeon: Kin Desouza D.O.;  Location: SURGERY [facility];  Service: General   • COLECTOMY N/A 11/10/2020    Procedure: COLECTOMY-SEGMENTAL, COLOSTOMY, MOBILIZATION OF SPLENIC FLEXURE, WOUND VAC PLACEMENT, IRRIGATION AND DEBRIDMENT FLANK WOUND;  Surgeon: Kin Desouza D.O.;  Location: [facility];  Service: General   • ZZZ CARDIAC CATH  07/21/2018    EF 45%, normal coronaries.   • IRRIGATION & DEBRIDEMENT ORTHO Right 2/19/2018    Procedure: IRRIGATION & DEBRIDEMENT ORTHO-FOOT;  Surgeon: Kirby Lopez M.D.;  Location: [facility];  Service: Orthopedics   • TOE AMPUTATION Right 1/17/2018    Procedure: TOE AMPUTATION-1ST RAY;  Surgeon: Doug Delong M.D.;  Location: [facility];  Service: Orthopedics   • TOE AMPUTATION Right 11/10/2017    Procedure: TOE AMPUTATION;  Surgeon: Osorio Leo M.D.;  Location: [facility];  Service: Orthopedics     Social History     Tobacco Use   • Smoking status: Never Smoker   • Smokeless tobacco: Never Used   Substance Use Topics   • Alcohol use: No     Chief Complaint   Patient presents with   • Syncope     X1 this morning when standing up to go to bathroom     Diagnosis: Syncope and collapse [R55]  Suicidal ideation [R45.851]  Sepsis due to Klebsiella pneumoniae (HCC) [A41.4]    Unit where seen by Wound Team: T418/00     WOUND CONSULT/FOLLOW UP RELATED TO: abdominal wound    WOUND HISTORY:  Pt is an older  gentleman well known to [facility] wound team 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 Pt currently admitted since 2/9/21 related to SI concerns due to body image issues related to colostomy.      *Pt underwent surgical reversal of colostomy with Dr. Desouza on 5/20/21. MLI was re-opened at that time and was then closed with interrupted staples and packing. Unfortunately MLI continued to Dehisce and therefore wound team was consulted to further manage MLI with dakins packing.     6/3 Wound vac placed    WOUND ASSESSMENT/LDA      Negative Pressure Wound Therapy 06/03/21 Abdomen (Active)   Vacuum Serial Number [id number] 06/26/21 2054   NPWT Pump Mode / Pressure Setting Continuous;125 mmHg 07/02/21 1000   Dressing Type Black Foam (Regular) 07/02/21 1000   Number of Foam Pieces Used 1 07/02/21 1000   Canister Changed No 07/02/21 1000   Output (mL) 45 mL 07/02/21 0829   NEXT Dressing Change/Treatment Date 07/05/21 07/02/21 1000     06/25/21 1330     06/28/21 1200     06/28/21 1200     06/28/21 1200   VAC VeraFlo Pressure (mm/Hg) Continuous;125 mmHg 07/02/21 1000   WOUND NURSE ONLY - Time Spent with Patient (mins) 30 07/02/21 1000           Wound 05/27/21 Full Thickness Wound Abdomen (Active)   Wound Image    06/30/21 1200   Site Assessment Pink;Red 07/02/21 1000   Periwound Assessment Intact;Other (Comment) 07/02/21 1000   Margins Defined edges;Unattached edges 07/02/21 1000   Closure Secondary intention 07/02/21 1000   Drainage Amount Scant 07/02/21 1000   Drainage Description Serosanguineous 07/02/21 1000   Treatments Cleansed;Site care 07/02/21 1000   Wound Cleansing Approved Wound Cleanser 07/02/21 1000   Periwound Protectant Skin Protectant Wipes to Periwound;Drape;Mepitel 07/02/21 1000   Dressing Cleansing/Solutions Not Applicable 07/02/21 1000   Dressing Options Wound Vac 07/02/21 1000   Dressing Changed Changed 07/02/21 1000   Dressing Status Clean;Dry;Intact 07/02/21 1000   Dressing  Change/Treatment Frequency Monday, Wednesday, Friday, and As Needed 21 1000   NEXT Dressing Change/Treatment Date 21 1000   NEXT Weekly Photo (Inpatient Only) 21 1000   Non-staged Wound Description Full thickness 21 1000                                                               Shape linear irregular 21 1000   Wound Odor None 21 1000   Pulses N/A 21 1000   Exposed Structures Fascia 21 1000   WOUND NURSE ONLY - Time Spent with Patient (mins) 30 21 1000         Vascular:    SHAYAN:   No results found.    Lab Values:    Lab Results   Component Value Date/Time    WBC 10.2 2021 06:01 AM    RBC 3.76 (L) 2021 06:01 AM    HEMOGLOBIN 10.5 (L) 2021 06:01 AM    HEMATOCRIT 34.4 (L) 2021 06:01 AM    CREACTPROT 0.48 06/10/2021 04:05 AM    SEDRATEWES 13 10/23/2018 08:02 AM    HBA1C 5.2 2021 01:30 AM      Culture Results show:  No results found for this or any previous visit (from the past 720 hour(s)).    Pain Level/Medicated:  Medicated with IV Dilaudid      INTERVENTIONS BY WOUND TEAM:  Chart and images reviewed. Discussed with bedside RN. All areas of concern (based on picture review, LDA review and discussion with bedside RN) have been thoroughly assessed. Documentation of areas based on significant findings. This RN in to assess patient. Performed standard wound care which includes appropriate positioning, dressing removal and non-selective debridement. Pictures and measurements obtained weekly if/when required.              ABD:   Preparation for Dressing removal:  Removed without difficulty.    Cleansed with: Wound cleanser and gauze   Sharp debridement: NA  Joya wound: Cleansed with Wound cleanser and gauze, No sting skin and strips of drape. Small piece of mepitel one applied to small partial thickness skin tears x3.   Primary Dressin piece of half thickness black foam applied to wound bed. Edges pulled together and  "secured with drape.   Secondary (Outer) Dressing:  Sealed with drape, and TRAC pad applied.     Interdisciplinary consultation: Patient, Bedside RN Sarah, Wound RN Meenu     EVALUATION / RATIONALE FOR TREATMENT:  Most Recent Date:    7/2/21: Wound bed clean and red with granular tissue. Continue NPWT.     6/30/21: Wound continues to progress with significantly less slough and wound is decreasing in size. Continued with NPWT.    6/28/21: Wound continues to have granular tissue. Pt heading to CT scan today. Wound has decreased in width significantly since dc of veraflo. Continued with NPWT.  06/25/21: wound continues to have granulation tissue.  Left abd with some protuding, Dr. natarajan updated and assessed.     6/23/21: wound continues to progress, transitioned to regular vac and continued to pull edges together.   6/21/21: Edges pulled together with jones by Dr. Rooney during change on Friday. Discussed with Lyly today. MD unable to be bedside today. Will continue with veraflo hopefully will be able to transition to regular vac at next visit to further assist with edges being pulled together. Meet with Dr. Rooney on Wednesday at 8am to assess wound.  6/18/21: continuing to decrease in slough in wound bed. One suture towards proximal end visible, per MD rooney, leave in place but monitor. Staples added to superior and inferior ends to assist with closure by MD. Continuing to pull edges together, MD will reassess on Monday with wound team to monitor for improvement and decide if surgical I&D is needed or if appropriate to switch to regular NPWT dressing instead of VF.   6/16/21: Wound with decreased slough. Wound appears to be \"flattening out.\" Veraflo is assisting with cleansing wound but is making pulling edges together difficult. Discussed with Dr. Rooney. Will plan to Meet with Dr. Rooney on Friday at 8am to assess wound, Dr. Rooney to pull superior and inferior borders together and staple to assist " with closure.  6/14/21: Wound continues to granulate. Small amount of slough over fascial line. Discussed with Dr. Desouza. Plan to continue with veraflo, settings decreased. Will evaluate the wound with Dr. Desouza on Wednesday at 9am. Per MD may consider OR washout with staples to distal proximal aspect to assist in wound closure vs. Adding staples at bedside.   06/11/21:  Wound looks good, minimal slough mainly to fascial line sutures.  VF grey foam layer to assist in debridement of slough.  Dr. Desouza to re-assess Monday and do surgical debridement if needed next week.   06/09/21:  Wound continues to progress, pull edges together to lessen width of wound. Dr. Desouza would like to see wound on Friday 6/7: Pt's wound appears to be improving per measurements and pictures. There is granulation tissue present.   06/5/21: Wound bed improving with increased granulation tissue.  Wound edges approximated prior to drape application.  NPWT continued.    06/03/21:  Fascial sutures intact, wound VAC placed with DR. Desouza.    6/1/21: Wound appears  than yesterdays pictures. Discussed with Dr. Desouza, plan to continue dakins TID for 48hrs, CT scan ordered today. Will re-evaluate on Thursday for potential vac placement.   5/31/21: Wound initially with interrupted staples and packing now with dehiscence. Per Dr. Baeza request wound team initiated dakins packing. Pt may benefit from NPWT to assist with closure.   5/8/21: wound nearly healed. Does not require advanced wound care. Wound team signing off and nursing to re consult if site worsens. Continue with dressing care per order.  4/21/21: abd wound improving. Nursing to continue dsg changes per order.  4/15: Right posterior thigh DTI now resolved. Pink and blanching. Abdominal wound to surface level continuing with dressing POC.  4/7/21: Thigh wound nearly healed and will likely be resolved at next assessment. Abdomen wound appears larger. Continued with Arti to  move through inflammatory phase. Hydrofera blue for its bacteriostatic properties and to assist in drainage management.   03/31/21: thigh wound healing well now to a stage 2.  Continue current plan.  All interventions in place.   ABD more denuded and bloody.  Arti to assist in moving wound through inflammatory phase.   3/23/21: Right posterior thigh wound remains a DTI but appears to be evolving into a stage 2. Wound team was previously consulted regarding sacral wound which appeared to be a skin tear however skin now appears to be intact however discolored. Does not appear to be pressure related as pt is on a waffle, self mobile and sacral mepilex is in use. Area is also not a normal pressure injury shape. Wound team will continue to follow.   3/18/21: Pt with linear DTIs to R posterior thigh from lying on top of frye cath tubing. Offloading precautions ordered. Wound team to continue following.     Goals: Steady decrease in wound area and depth weekly.    WOUND TEAM PLAN OF CARE ([X] for frequency of wound follow up,):   Nursing to follow orders written for wound care. Contact wound team if area fails to progress, deteriorates or with any questions/concerns  Dressing changes by wound team:                   Follow up 3 times weekly:                NPWT change 3 times weekly:   X  Follow up 1-2 times weekly:     Follow up Bi-Monthly:                   Follow up as needed:   Other (explain):     NURSING PLAN OF CARE ORDERS (X):  Dressing changes: See Dressing Care orders: X  Skin care: See Skin Care orders: x  RN Prevention Protocol: x  Rectal tube care: See Rectal Tube Care orders:   Other orders:     Anticipated discharge plans:   LTACH:        SNF/Rehab:                  Home Health Care:           Outpatient Wound Center:            Self/Family Care:        Other:   Group Home

## 2021-07-02 NOTE — PROGRESS NOTES
Received report from day shift RN. Assumed patient care  AOx4  Pain rated at 4/10, declines intervention at this time  Room air  No complaints of nausea at this time, tolerating GI soft diet  Abdominal wound vac in place, patent, no leaks noted  Rectal tube flushed per order. Area cleansed and barrier cream applied  +void  SCDs on  Call light wihtin reach  Bed locked and in low position   POC discussed with patient  All needs met at this time.

## 2021-07-02 NOTE — CARE PLAN
The patient is Stable - Low risk of patient condition declining or worsening    Shift Goals  Clinical Goals: q2 turns  Patient Goals: pain control  Family Goals: n/a    Progress made toward(s) clinical / shift goals:    Problem: Knowledge Deficit - Standard  Goal: Patient and family/care givers will demonstrate understanding of plan of care, disease process/condition, diagnostic tests and medications  Outcome: Progressing     Problem: Fall Risk  Goal: Patient will remain free from falls  Outcome: Progressing     Problem: Pain - Standard  Goal: Alleviation of pain or a reduction in pain to the patient’s comfort goal  Outcome: Progressing       Patient is not progressing towards the following goals:      Problem: Bowel Elimination  Goal: Establish and maintain regular bowel function  Outcome: Not Progressing

## 2021-07-03 LAB
IGA SERPL-MCNC: 453 MG/DL (ref 68–408)
TTG IGA SER IA-ACNC: <2 U/ML (ref 0–3)

## 2021-07-03 PROCEDURE — 700102 HCHG RX REV CODE 250 W/ 637 OVERRIDE(OP): Performed by: INTERNAL MEDICINE

## 2021-07-03 PROCEDURE — 770001 HCHG ROOM/CARE - MED/SURG/GYN PRIV*

## 2021-07-03 PROCEDURE — 99231 SBSQ HOSP IP/OBS SF/LOW 25: CPT | Performed by: STUDENT IN AN ORGANIZED HEALTH CARE EDUCATION/TRAINING PROGRAM

## 2021-07-03 PROCEDURE — 700111 HCHG RX REV CODE 636 W/ 250 OVERRIDE (IP): Performed by: INTERNAL MEDICINE

## 2021-07-03 PROCEDURE — A9270 NON-COVERED ITEM OR SERVICE: HCPCS | Performed by: INTERNAL MEDICINE

## 2021-07-03 PROCEDURE — A9270 NON-COVERED ITEM OR SERVICE: HCPCS | Performed by: FAMILY MEDICINE

## 2021-07-03 PROCEDURE — 700102 HCHG RX REV CODE 250 W/ 637 OVERRIDE(OP): Performed by: FAMILY MEDICINE

## 2021-07-03 RX ADMIN — AMIODARONE HYDROCHLORIDE 200 MG: 200 TABLET ORAL at 05:49

## 2021-07-03 RX ADMIN — CALCIUM POLYCARBOPHIL 625 MG: 625 TABLET, FILM COATED ORAL at 07:34

## 2021-07-03 RX ADMIN — CLONAZEPAM 1 MG: 1 TABLET ORAL at 17:40

## 2021-07-03 RX ADMIN — AMIODARONE HYDROCHLORIDE 200 MG: 200 TABLET ORAL at 17:48

## 2021-07-03 RX ADMIN — Medication 1000 UNITS: at 05:49

## 2021-07-03 RX ADMIN — CLONAZEPAM 0.5 MG: 0.5 TABLET ORAL at 07:31

## 2021-07-03 RX ADMIN — ZIPRASIDONE HYDROCHLORIDE 60 MG: 60 CAPSULE ORAL at 19:39

## 2021-07-03 RX ADMIN — DIPHENOXYLATE HYDROCHLORIDE AND ATROPINE SULFATE 1 TABLET: 2.5; .025 TABLET ORAL at 17:40

## 2021-07-03 RX ADMIN — TAMSULOSIN HYDROCHLORIDE 0.4 MG: 0.4 CAPSULE ORAL at 09:16

## 2021-07-03 RX ADMIN — PAROXETINE HYDROCHLORIDE 20 MG: 20 TABLET, FILM COATED ORAL at 07:31

## 2021-07-03 RX ADMIN — HYDROMORPHONE HYDROCHLORIDE 0.5 MG: 1 INJECTION, SOLUTION INTRAMUSCULAR; INTRAVENOUS; SUBCUTANEOUS at 19:39

## 2021-07-03 RX ADMIN — CALCIUM POLYCARBOPHIL 625 MG: 625 TABLET, FILM COATED ORAL at 17:46

## 2021-07-03 RX ADMIN — OMEPRAZOLE 40 MG: 20 CAPSULE, DELAYED RELEASE ORAL at 17:53

## 2021-07-03 RX ADMIN — OMEPRAZOLE 40 MG: 20 CAPSULE, DELAYED RELEASE ORAL at 05:49

## 2021-07-03 RX ADMIN — OXYCODONE 10 MG: 5 TABLET ORAL at 12:17

## 2021-07-03 RX ADMIN — OXYCODONE 10 MG: 5 TABLET ORAL at 05:49

## 2021-07-03 RX ADMIN — ATORVASTATIN CALCIUM 40 MG: 40 TABLET, FILM COATED ORAL at 17:40

## 2021-07-03 RX ADMIN — OXYCODONE 10 MG: 5 TABLET ORAL at 17:39

## 2021-07-03 RX ADMIN — POTASSIUM CHLORIDE 40 MEQ: 1500 TABLET, EXTENDED RELEASE ORAL at 05:49

## 2021-07-03 RX ADMIN — METOPROLOL TARTRATE 25 MG: 25 TABLET, FILM COATED ORAL at 17:46

## 2021-07-03 RX ADMIN — OXYCODONE 10 MG: 5 TABLET ORAL at 21:47

## 2021-07-03 RX ADMIN — POTASSIUM CHLORIDE 40 MEQ: 1500 TABLET, EXTENDED RELEASE ORAL at 17:39

## 2021-07-03 RX ADMIN — OXYCODONE 10 MG: 5 TABLET ORAL at 23:51

## 2021-07-03 RX ADMIN — HYDROCORTISONE 5 MG: 5 TABLET ORAL at 05:49

## 2021-07-03 RX ADMIN — HYDROCORTISONE 5 MG: 5 TABLET ORAL at 15:32

## 2021-07-03 RX ADMIN — LEVOTHYROXINE SODIUM 125 MCG: 0.12 TABLET ORAL at 05:49

## 2021-07-03 RX ADMIN — APIXABAN 5 MG: 5 TABLET, FILM COATED ORAL at 05:49

## 2021-07-03 RX ADMIN — DIPHENOXYLATE HYDROCHLORIDE AND ATROPINE SULFATE 1 TABLET: 2.5; .025 TABLET ORAL at 05:49

## 2021-07-03 RX ADMIN — ZIPRASIDONE HYDROCHLORIDE 60 MG: 60 CAPSULE ORAL at 09:16

## 2021-07-03 RX ADMIN — APIXABAN 5 MG: 5 TABLET, FILM COATED ORAL at 17:39

## 2021-07-03 RX ADMIN — METOPROLOL TARTRATE 25 MG: 25 TABLET, FILM COATED ORAL at 05:49

## 2021-07-03 ASSESSMENT — PAIN DESCRIPTION - PAIN TYPE
TYPE: ACUTE PAIN
TYPE: CHRONIC PAIN

## 2021-07-03 ASSESSMENT — ENCOUNTER SYMPTOMS
DIZZINESS: 0
BLURRED VISION: 0
DEPRESSION: 0
FOCAL WEAKNESS: 0
MYALGIAS: 0
VOMITING: 0
DIARRHEA: 1
CHILLS: 0
ABDOMINAL PAIN: 1
SPEECH CHANGE: 0
NAUSEA: 0
BLOOD IN STOOL: 0
SHORTNESS OF BREATH: 0
HEARTBURN: 0
NERVOUS/ANXIOUS: 0
COUGH: 0
FEVER: 0
WEAKNESS: 1

## 2021-07-03 NOTE — CARE PLAN
Problem: Provide Safe Environment  Goal: Suicide environmental safety, protocols, policies, and practices will be implemented  Outcome: Progressing     Problem: Fall Risk  Goal: Patient will remain free from falls  Outcome: Progressing     The patient is Stable - Low risk of patient condition declining or worsening    Shift Goals  Clinical Goals: q2 turns  Patient Goals: pain control  Family Goals: n/a

## 2021-07-03 NOTE — PROGRESS NOTES
[facility] Medicine Daily Progress Note    Date of Service  7/3/2021    Chief Complaint  56 y.o. male admitted 2/9/2021 with Syncope.    Hospital Course  Admitted with syncope, he was noted to be bradycardic and hypotensive.  He has known history of permanent atrial fibrillation and he was on diltiazem, digoxin and metoprolol.  These medications were held.  Later on he was restarted on low-dose Toprol and Digoxin.  He was also started on anticoagulation with Eliquis.  He also had a possible history of adrenal insufficiency, he was already on Florinef and midodrine.  Midodrine was discontinued and he was started on cortisol. Further work-up showed that he had gram-negative bacteremia as well as urinary tract infection and acute kidney injury.  He was initially placed on IV Zosyn, later changed to oral Bactrim to finish the course.  He was carefully hydrated with IV fluids.  On admission he also expressed suicidal ideation.  Psychiatry was consulted the case, he was placed on a legal hold.  He was noted to have severe depression.  He has been placed on medication the form of Geodon, Paxil, Klonopin.  He has a colostomy in place, which the patient believes contributes to his severe depression because of ostomy care.  He had history of bowel perforation and splenic fluid collection requiring Segmental colectomy, colostomy creation, mobilization of splenic flexure, wound VAC placement, irrigation and debridement of flank wound on 11/10/2020.  Surgery was consulted on the case, and he underwent exploratory laparotomy and colostomy takedown and closure on 5/27/2021.  Wound VAC was later placed.  Psychiatry continued to follow the patient, his legal hold was discontinued on 6/4/2021    Interval Problem Update  Ab wound-no issues, no leakage.      Dirrhea-multiple accidents last night. BMS remains in place  7/3/2021:  Patient pending placement in [facility] will follow up on 7/5/2021.  Otherwise denies subjective fevers or  chills continue present medical management.  No acute events overnight.    Consultants/Specialty  Cardiology  Psychiatry  Surgery    Code Status  Full Code    Disposition  SNF     Review of Systems  Review of Systems   Constitutional: Negative for chills and fever.   Eyes: Negative for blurred vision.   Respiratory: Negative for cough and shortness of breath.    Cardiovascular: Negative for chest pain.   Gastrointestinal: Positive for abdominal pain (nearly absent) and diarrhea (no clear improvment noted). Negative for blood in stool, heartburn, melena, nausea and vomiting.   Genitourinary: Negative for dysuria.   Musculoskeletal: Negative for myalgias.   Neurological: Positive for weakness. Negative for dizziness, speech change and focal weakness.   Psychiatric/Behavioral: Negative for depression. The patient is not nervous/anxious.    All other systems reviewed and are negative.     Physical Exam  Temp:  [36.1 °C (97 °F)-36.5 °C (97.7 °F)] 36.5 °C (97.7 °F)  Pulse:  [80-97] 95  Resp:  [17-18] 18  BP: (116-123)/(72-82) 116/72  SpO2:  [92 %-98 %] 92 %    Physical Exam  Vitals and nursing note reviewed.   Constitutional:       Appearance: He is obese.      Comments: Weak appearing  'Pale  More bright appearing today and watching Mirna   HENT:      Head: Normocephalic and atraumatic.      Nose: No congestion.      Mouth/Throat:      Mouth: Mucous membranes are moist.   Eyes:      Extraocular Movements: Extraocular movements intact.      Conjunctiva/sclera: Conjunctivae normal.      Pupils: Pupils are equal, round, and reactive to light.   Cardiovascular:      Rate and Rhythm: Normal rate. Rhythm irregularly irregular.   Pulmonary:      Effort: Pulmonary effort is normal.      Breath sounds: Normal breath sounds.   Abdominal:      General: There is no distension.      Tenderness: There is abdominal tenderness.      Comments: Wound vac-good seal observed  Body mass index is 50.08 kg/m²   Genitourinary:     Comments:  BMS in place, liquid brown stool observed, remains without interval changes today  Musculoskeletal:      Cervical back: No muscular tenderness.      Right lower leg: Edema present.      Left lower leg: Edema present.   Skin:     General: Skin is warm and dry.      Coloration: Skin is pale.   Neurological:      General: No focal deficit present.      Mental Status: He is alert and oriented to person, place, and time.      Cranial Nerves: No cranial nerve deficit.   Psychiatric:         Mood and Affect: Mood is not anxious or depressed.         Thought Content: Thought content is not paranoid or delusional. Thought content does not include homicidal or suicidal ideation. Thought content does not include homicidal or suicidal plan.       Fluids    Intake/Output Summary (Last 24 hours) at 7/3/2021 1212  Last data filed at 7/3/2021 0750  Gross per 24 hour   Intake 360 ml   Output 1000 ml   Net -640 ml       Laboratory      Recent Labs     07/01/21  0329   SODIUM 137   POTASSIUM 3.9   CHLORIDE 107   CO2 22   GLUCOSE 113*   BUN 23*   CREATININE 1.26   CALCIUM 8.3*                   Imaging  CT-ABDOMEN-PELVIS W/O   Final Result         1. Liquid stool in the distal colon, consistent with diarrhea.   2. Unchanged perirectal fat stranding, sequela of nonspecific proctitis.   3. Cholelithiasis. No cholecystitis.   4. Decreased blood pool attenuation, suggesting anemia.   5. Chronic T11 and T12 compression fractures.      US-RENAL   Final Result      1.  Low-level echogenic debris in the urinary bladder is noted which could indicate UTI or blood within the bladder.      2.  No hydronephrosis.      IR-MIDLINE CATHETER INSERTION WO GUIDANCE > AGE 3   Final Result                  Ultrasound-guided midline placement performed by qualified nursing staff    as above.          EC-ECHOCARDIOGRAM COMPLETE W/ CONT   Final Result      DX-CHEST-PORTABLE (1 VIEW)   Final Result      1.  Minor asymmetric interstitial opacity in the right  lower lobe which could represent minor interstitial edema, pneumonia, or fibrotic change.      2.  No left lung consolidation.      3.  No evidence of congestive failure.      IN-ETACMBJ-6 VIEW   Final Result         No specific finding to suggest small bowel obstruction.      RJ-LYPRLFN-6 VIEW   Final Result         No significant interval change.      DX-CHEST-LIMITED (1 VIEW)   Final Result         Diffuse interstitial prominence could relate to mild pulmonary edema or atypical infection      [id number] VIEW   Final Result      Increased colonic gas without definite bowel obstruction.      US-RENAL   Final Result      No evidence of hydronephrosis.      Lobulated kidneys bilaterally.      CT-ABDOMEN-PELVIS WITH   Final Result      1.  There is no evidence of small bowel obstruction.   2.  There is a left lower quadrant ostomy.   3.  There is fluid distention of the colon distal to the surgical material in the left mid descending colon extending all the way to the rectum. There is no pneumatosis or free air.   4.  There is cholelithiasis without biliary dilatation.   5.  There has been interval removal of the drainage catheter anterior to the spleen with minimal hypodense area in the anterior spleen again noted. There is no new fluid collection.      IR-US GUIDED PIV   Final Result    Ultrasound-guided PERIPHERAL IV INSERTION performed by    qualified nursing staff as above.      EC-ECHOCARDIOGRAM COMPLETE W/O CONT   Final Result      DX-LUMBAR SPINE-2 OR 3 VIEWS   Final Result      Moderate compression deformity of T11 is new compared to 2018.      Mild wedge deformity of T12 is unchanged.      Degenerative changes including facet arthropathy.      Mild retrolisthesis of L5 on S1 and L3 on L4.              Assessment/Plan  Diarrhea- (present on admission)  Assessment & Plan  C diff negative  Check stool wbc and culture-negative  Fecal lactoferrin positive, indicating presence of fecal leukocytes (had prior  bowel surgery, doubtful it is IBD), monitor  -check celiac sprue panel-pending  Check CT abd/pelvis, negative for acute findings  -will trial low dose lomotil (will schedule for short period), BMS in place  -will add metamucil bulking agent and now on fibercon scheduled  -unclear etiology  -check fecal fat-pending    Normocytic anemia- (present on admission)  Assessment & Plan  Follow cbc    Urinary tract infection- (present on admission)  Assessment & Plan  Completed 10-day course of Bactrim    Leukocytosis- (present on admission)  Assessment & Plan  Follow cbc    Obstructive uropathy- (present on admission)  Assessment & Plan  resolved    Gram-negative bacteremia- (present on admission)  Assessment & Plan  Completed 10-day course of Bactrim    Permanent atrial fibrillation (HCC)- (present on admission)  Assessment & Plan  Metoprolol, Eliquis    Chronic venous insufficiency of lower extremity- (present on admission)  Assessment & Plan  compression stockings.    Chronic heart failure with preserved ejection fraction (HCC)- (present on admission)  Assessment & Plan  Metoprolol, Lasix  Fluid restriction of 1.5 L per day      Type 2 diabetes mellitus with hyperglycemia, with long-term current use of insulin (HCC)- (present on admission)  Assessment & Plan  Follow bmp    Major depressive disorder, recurrent, severe with psychotic features (HCC)- (present on admission)  Assessment & Plan  Geodon, Paxil, Klonopin    Colostomy present (HCC)- (present on admission)  Assessment & Plan  Exploratory laparotomy with colostomy takedown and closure 5/27/2021, no longer present        Hypothyroidism- (present on admission)  Assessment & Plan  Increased Levothyroxine to 125 mcg      SRUTHI (acute kidney injury) (HCC)- (present on admission)  Assessment & Plan  Resolved    Adrenal insufficiency (HCC)- (present on admission)  Assessment & Plan  Cortef 5 mg BID  Plan to decrease to daily on 7/4/2021    Debility- (present on  admission)  Assessment & Plan  PT and OT    Suicidal ideation- (present on admission)  Assessment & Plan  Legal hold discontinued 6/4/2021    Orthostatic hypotension- (present on admission)  Assessment & Plan  off Florinef.    Mixed hyperlipidemia- (present on admission)  Assessment & Plan  Atorvastatin.     Lower limb amputation, great toe (HCC)- (present on admission)  Assessment & Plan  Monitor  No signs of infection     Hypomagnesemia- (present on admission)  Assessment & Plan  IV Mg 2 g  Follow level    Obesity due to excess calories with serious comorbidity- (present on admission)  Assessment & Plan  Body mass index is 50.08 kg/m².    Hypokalemia- (present on admission)  Assessment & Plan  Kdur  Follow bmp       VTE prophylaxis: Lovenox

## 2021-07-03 NOTE — PROGRESS NOTES
Bedside report received. Assessment completed.  Pt is A&O x4. Pt on room air.   Medicating for pain PRN per MAR Pain 3/10  Denies nausea.  Skin see flowsheet.   LDA CDI   Last BM 7/3/21,  +flatus, +void.  GI soft diet and 1500mL fluid restriction. Tolerates well.   Pt up with max assist.  Call light and belongings within reach. All needs met at this time. Fall Precautions and hourly rounding in place.

## 2021-07-03 NOTE — CARE PLAN
The patient is Stable - Low risk of patient condition declining or worsening    Shift Goals  Clinical Goals: q2 turns  Patient Goals: pain control  Family Goals: n/a    Progress made toward(s) clinical / shift goals:     Patient is not progressing towards the following goals:    Problem: Knowledge Deficit - Standard  Goal: Patient and family/care givers will demonstrate understanding of plan of care, disease process/condition, diagnostic tests and medications  Outcome: Progressing     Problem: Fall Risk  Goal: Patient will remain free from falls  Outcome: Progressing

## 2021-07-04 PROCEDURE — A9270 NON-COVERED ITEM OR SERVICE: HCPCS | Performed by: INTERNAL MEDICINE

## 2021-07-04 PROCEDURE — 700102 HCHG RX REV CODE 250 W/ 637 OVERRIDE(OP): Performed by: INTERNAL MEDICINE

## 2021-07-04 PROCEDURE — A9270 NON-COVERED ITEM OR SERVICE: HCPCS | Performed by: FAMILY MEDICINE

## 2021-07-04 PROCEDURE — 99231 SBSQ HOSP IP/OBS SF/LOW 25: CPT | Performed by: STUDENT IN AN ORGANIZED HEALTH CARE EDUCATION/TRAINING PROGRAM

## 2021-07-04 PROCEDURE — 770001 HCHG ROOM/CARE - MED/SURG/GYN PRIV*

## 2021-07-04 PROCEDURE — 700102 HCHG RX REV CODE 250 W/ 637 OVERRIDE(OP): Performed by: FAMILY MEDICINE

## 2021-07-04 RX ORDER — AMIODARONE HYDROCHLORIDE 200 MG/1
200 TABLET ORAL
Status: DISCONTINUED | OUTPATIENT
Start: 2021-07-05 | End: 2021-07-09 | Stop reason: HOSPADM

## 2021-07-04 RX ADMIN — OXYCODONE 10 MG: 5 TABLET ORAL at 14:08

## 2021-07-04 RX ADMIN — POTASSIUM CHLORIDE 40 MEQ: 1500 TABLET, EXTENDED RELEASE ORAL at 04:35

## 2021-07-04 RX ADMIN — Medication 1000 UNITS: at 04:35

## 2021-07-04 RX ADMIN — CALCIUM POLYCARBOPHIL 625 MG: 625 TABLET, FILM COATED ORAL at 17:37

## 2021-07-04 RX ADMIN — OMEPRAZOLE 40 MG: 20 CAPSULE, DELAYED RELEASE ORAL at 17:38

## 2021-07-04 RX ADMIN — CLONAZEPAM 0.5 MG: 0.5 TABLET ORAL at 08:24

## 2021-07-04 RX ADMIN — ZIPRASIDONE HYDROCHLORIDE 60 MG: 60 CAPSULE ORAL at 19:29

## 2021-07-04 RX ADMIN — PAROXETINE HYDROCHLORIDE 20 MG: 20 TABLET, FILM COATED ORAL at 08:24

## 2021-07-04 RX ADMIN — CLONAZEPAM 1 MG: 1 TABLET ORAL at 17:38

## 2021-07-04 RX ADMIN — AMIODARONE HYDROCHLORIDE 200 MG: 200 TABLET ORAL at 04:34

## 2021-07-04 RX ADMIN — APIXABAN 5 MG: 5 TABLET, FILM COATED ORAL at 04:35

## 2021-07-04 RX ADMIN — OXYCODONE 10 MG: 5 TABLET ORAL at 09:26

## 2021-07-04 RX ADMIN — HYDROCORTISONE 5 MG: 5 TABLET ORAL at 15:47

## 2021-07-04 RX ADMIN — LEVOTHYROXINE SODIUM 125 MCG: 0.12 TABLET ORAL at 04:34

## 2021-07-04 RX ADMIN — DIPHENOXYLATE HYDROCHLORIDE AND ATROPINE SULFATE 1 TABLET: 2.5; .025 TABLET ORAL at 17:37

## 2021-07-04 RX ADMIN — APIXABAN 5 MG: 5 TABLET, FILM COATED ORAL at 17:37

## 2021-07-04 RX ADMIN — OXYCODONE 10 MG: 5 TABLET ORAL at 03:00

## 2021-07-04 RX ADMIN — TAMSULOSIN HYDROCHLORIDE 0.4 MG: 0.4 CAPSULE ORAL at 08:24

## 2021-07-04 RX ADMIN — ZIPRASIDONE HYDROCHLORIDE 60 MG: 60 CAPSULE ORAL at 08:24

## 2021-07-04 RX ADMIN — OXYCODONE 10 MG: 5 TABLET ORAL at 17:39

## 2021-07-04 RX ADMIN — METOPROLOL TARTRATE 25 MG: 25 TABLET, FILM COATED ORAL at 04:35

## 2021-07-04 RX ADMIN — HYDROCORTISONE 5 MG: 5 TABLET ORAL at 04:36

## 2021-07-04 RX ADMIN — DIPHENOXYLATE HYDROCHLORIDE AND ATROPINE SULFATE 1 TABLET: 2.5; .025 TABLET ORAL at 04:35

## 2021-07-04 RX ADMIN — CALCIUM POLYCARBOPHIL 625 MG: 625 TABLET, FILM COATED ORAL at 07:10

## 2021-07-04 RX ADMIN — OMEPRAZOLE 40 MG: 20 CAPSULE, DELAYED RELEASE ORAL at 04:35

## 2021-07-04 RX ADMIN — POTASSIUM CHLORIDE 40 MEQ: 1500 TABLET, EXTENDED RELEASE ORAL at 17:38

## 2021-07-04 RX ADMIN — METOPROLOL TARTRATE 25 MG: 25 TABLET, FILM COATED ORAL at 17:38

## 2021-07-04 RX ADMIN — ATORVASTATIN CALCIUM 40 MG: 40 TABLET, FILM COATED ORAL at 17:38

## 2021-07-04 ASSESSMENT — ENCOUNTER SYMPTOMS
BLOOD IN STOOL: 0
FEVER: 0
BLURRED VISION: 0
NERVOUS/ANXIOUS: 0
HEARTBURN: 0
NAUSEA: 0
CHILLS: 0
VOMITING: 0
MYALGIAS: 0
SHORTNESS OF BREATH: 0
COUGH: 0
SPEECH CHANGE: 0
ABDOMINAL PAIN: 1
DIZZINESS: 0
FOCAL WEAKNESS: 0
DIARRHEA: 1
DEPRESSION: 0
WEAKNESS: 1

## 2021-07-04 ASSESSMENT — PAIN DESCRIPTION - PAIN TYPE
TYPE: ACUTE PAIN

## 2021-07-04 NOTE — CARE PLAN
Problem: Knowledge Deficit - Standard  Goal: Patient and family/care givers will demonstrate understanding of plan of care, disease process/condition, diagnostic tests and medications  Outcome: Progressing  Note: Pt reports understanding of plan of care and has no questions at this time     Problem: Depression  Goal: Patient and family/caregiver will verbalize accurate information about at least two of the possible causes of depression, three-four of the signs and symptoms of depression  Outcome: Progressing  Note: Pt reports no depression or anxiety at this time   The patient is Watcher - Medium risk of patient condition declining or worsening    Shift Goals  Clinical Goals: Prevent skin breakdown on bottom  Patient Goals: Pain control  Family Goals: n/a    Progress made toward(s) clinical / shift goals:  Progressing as expected    Patient is not progressing towards the following goals:

## 2021-07-04 NOTE — PROGRESS NOTES
Pt is laying in bed, call light within reach, bed lowered and locked, fall education reinforced. Pt is A&Ox4 and on room air. Pt lung sounds are diminished in the lower lobes, bowel sounds are hyperactive in all four quadrants, heart sounds are within defined limits. Pt IV is clean,dry,intact,and patent and saline locked. Pt is up x2 to stand. Pt has a MLI with wound vac in place CDI. Pt has excoriation to inner thighs and bilateral buttocks. BMS system in place with mild leaking.

## 2021-07-04 NOTE — CARE PLAN
Problem: Provide Safe Environment  Goal: Suicide environmental safety, protocols, policies, and practices will be implemented  Outcome: Progressing     Problem: Fall Risk  Goal: Patient will remain free from falls  Outcome: Progressing   The patient is Watcher - Medium risk of patient condition declining or worsening    Shift Goals  Clinical Goals: Prevent skin breakdown on bottom  Patient Goals: Pain control  Family Goals: n/a

## 2021-07-04 NOTE — PROGRESS NOTES
[facility] Medicine Daily Progress Note    Date of Service  7/4/2021    Chief Complaint  56 y.o. male admitted 2/9/2021 with Syncope.    Hospital Course  Admitted with syncope, he was noted to be bradycardic and hypotensive.  He has known history of permanent atrial fibrillation and he was on diltiazem, digoxin and metoprolol.  These medications were held.  Later on he was restarted on low-dose Toprol and Digoxin.  He was also started on anticoagulation with Eliquis.  He also had a possible history of adrenal insufficiency, he was already on Florinef and midodrine.  Midodrine was discontinued and he was started on cortisol. Further work-up showed that he had gram-negative bacteremia as well as urinary tract infection and acute kidney injury.  He was initially placed on IV Zosyn, later changed to oral Bactrim to finish the course.  He was carefully hydrated with IV fluids.  On admission he also expressed suicidal ideation.  Psychiatry was consulted the case, he was placed on a legal hold.  He was noted to have severe depression.  He has been placed on medication the form of Geodon, Paxil, Klonopin.  He has a colostomy in place, which the patient believes contributes to his severe depression because of ostomy care.  He had history of bowel perforation and splenic fluid collection requiring Segmental colectomy, colostomy creation, mobilization of splenic flexure, wound VAC placement, irrigation and debridement of flank wound on 11/10/2020.  Surgery was consulted on the case, and he underwent exploratory laparotomy and colostomy takedown and closure on 5/27/2021.  Wound VAC was later placed.  Psychiatry continued to follow the patient, his legal hold was discontinued on 6/4/2021    Interval Problem Update  Ab wound-no issues, no leakage.      Dirrhea-multiple accidents last night. BMS remains in place  7/3/2021:  Patient pending placement in [facility] will follow up on 7/5/2021.  Otherwise denies subjective fevers or  chills continue present medical management.  No acute events overnight.  7/4/2021:  No acute events overnight continue present medical management. Slight adjustment to amiodarone appreciate pharmacy recommendations. Continue to await placement.    Consultants/Specialty  Cardiology  Psychiatry  Surgery    Code Status  Full Code    Disposition  SNF     Review of Systems  Review of Systems   Constitutional: Negative for chills and fever.   Eyes: Negative for blurred vision.   Respiratory: Negative for cough and shortness of breath.    Cardiovascular: Negative for chest pain.   Gastrointestinal: Positive for abdominal pain (nearly absent) and diarrhea (no clear improvment noted). Negative for blood in stool, heartburn, melena, nausea and vomiting.   Genitourinary: Negative for dysuria.   Musculoskeletal: Negative for myalgias.   Neurological: Positive for weakness. Negative for dizziness, speech change and focal weakness.   Psychiatric/Behavioral: Negative for depression. The patient is not nervous/anxious.    All other systems reviewed and are negative.     Physical Exam  Temp:  [36.4 °C (97.5 °F)-36.9 °C (98.4 °F)] 36.9 °C (98.4 °F)  Pulse:  [78-87] 81  Resp:  [18] 18  BP: (101-132)/(50-83) 132/78  SpO2:  [91 %-94 %] 94 %    Physical Exam  Vitals and nursing note reviewed.   Constitutional:       Appearance: He is obese.      Comments: Weak appearing  'Pale  More bright appearing today and watching Mirna   HENT:      Head: Normocephalic and atraumatic.      Nose: No congestion.      Mouth/Throat:      Mouth: Mucous membranes are moist.   Eyes:      Extraocular Movements: Extraocular movements intact.      Conjunctiva/sclera: Conjunctivae normal.      Pupils: Pupils are equal, round, and reactive to light.   Cardiovascular:      Rate and Rhythm: Normal rate. Rhythm irregularly irregular.   Pulmonary:      Effort: Pulmonary effort is normal.      Breath sounds: Normal breath sounds.   Abdominal:      General: There  is no distension.      Tenderness: There is abdominal tenderness.      Comments: Wound vac-good seal observed  Body mass index is 50.08 kg/m²   Genitourinary:     Comments: BMS in place, liquid brown stool observed, remains without interval changes today  Musculoskeletal:      Cervical back: No muscular tenderness.      Right lower leg: Edema present.      Left lower leg: Edema present.   Skin:     General: Skin is warm and dry.      Coloration: Skin is pale.   Neurological:      General: No focal deficit present.      Mental Status: He is alert and oriented to person, place, and time.      Cranial Nerves: No cranial nerve deficit.   Psychiatric:         Mood and Affect: Mood is not anxious or depressed.         Thought Content: Thought content is not paranoid or delusional. Thought content does not include homicidal or suicidal ideation. Thought content does not include homicidal or suicidal plan.       Fluids    Intake/Output Summary (Last 24 hours) at 7/4/2021 1424  Last data filed at 7/4/2021 0738  Gross per 24 hour   Intake 460 ml   Output 1125 ml   Net -665 ml       Laboratory                        Imaging  CT-ABDOMEN-PELVIS W/O   Final Result         1. Liquid stool in the distal colon, consistent with diarrhea.   2. Unchanged perirectal fat stranding, sequela of nonspecific proctitis.   3. Cholelithiasis. No cholecystitis.   4. Decreased blood pool attenuation, suggesting anemia.   5. Chronic T11 and T12 compression fractures.      US-RENAL   Final Result      1.  Low-level echogenic debris in the urinary bladder is noted which could indicate UTI or blood within the bladder.      2.  No hydronephrosis.      IR-MIDLINE CATHETER INSERTION WO GUIDANCE > AGE 3   Final Result                  Ultrasound-guided midline placement performed by qualified nursing staff    as above.          EC-ECHOCARDIOGRAM COMPLETE W/ CONT   Final Result      DX-CHEST-PORTABLE (1 VIEW)   Final Result      1.  Minor asymmetric  interstitial opacity in the right lower lobe which could represent minor interstitial edema, pneumonia, or fibrotic change.      2.  No left lung consolidation.      3.  No evidence of congestive failure.      SD-PNNNHFO-5 VIEW   Final Result         No specific finding to suggest small bowel obstruction.      LP-BRTGBDB-4 VIEW   Final Result         No significant interval change.      DX-CHEST-LIMITED (1 VIEW)   Final Result         Diffuse interstitial prominence could relate to mild pulmonary edema or atypical infection      FQ-AMCFCRA-4 VIEW   Final Result      Increased colonic gas without definite bowel obstruction.      US-RENAL   Final Result      No evidence of hydronephrosis.      Lobulated kidneys bilaterally.      CT-ABDOMEN-PELVIS WITH   Final Result      1.  There is no evidence of small bowel obstruction.   2.  There is a left lower quadrant ostomy.   3.  There is fluid distention of the colon distal to the surgical material in the left mid descending colon extending all the way to the rectum. There is no pneumatosis or free air.   4.  There is cholelithiasis without biliary dilatation.   5.  There has been interval removal of the drainage catheter anterior to the spleen with minimal hypodense area in the anterior spleen again noted. There is no new fluid collection.      IR-US GUIDED PIV   Final Result    Ultrasound-guided PERIPHERAL IV INSERTION performed by    qualified nursing staff as above.      EC-ECHOCARDIOGRAM COMPLETE W/O CONT   Final Result      DX-LUMBAR SPINE-2 OR 3 VIEWS   Final Result      Moderate compression deformity of T11 is new compared to 2018.      Mild wedge deformity of T12 is unchanged.      Degenerative changes including facet arthropathy.      Mild retrolisthesis of L5 on S1 and L3 on L4.              Assessment/Plan  Diarrhea- (present on admission)  Assessment & Plan  C diff negative  Check stool wbc and culture-negative  Fecal lactoferrin positive, indicating presence  of fecal leukocytes (had prior bowel surgery, doubtful it is IBD), monitor  -check celiac sprue panel-pending  Check CT abd/pelvis, negative for acute findings  -will trial low dose lomotil (will schedule for short period), BMS in place  -will add metamucil bulking agent and now on fibercon scheduled  -unclear etiology  -check fecal fat-pending    Normocytic anemia- (present on admission)  Assessment & Plan  Follow cbc    Urinary tract infection- (present on admission)  Assessment & Plan  Completed 10-day course of Bactrim    Leukocytosis- (present on admission)  Assessment & Plan  Follow cbc    Obstructive uropathy- (present on admission)  Assessment & Plan  resolved    Gram-negative bacteremia- (present on admission)  Assessment & Plan  Completed 10-day course of Bactrim    Permanent atrial fibrillation (HCC)- (present on admission)  Assessment & Plan  Metoprolol, Eliquis    Chronic venous insufficiency of lower extremity- (present on admission)  Assessment & Plan  compression stockings.    Chronic heart failure with preserved ejection fraction (HCC)- (present on admission)  Assessment & Plan  Metoprolol, Lasix  Fluid restriction of 1.5 L per day      Type 2 diabetes mellitus with hyperglycemia, with long-term current use of insulin (HCC)- (present on admission)  Assessment & Plan  Follow bmp    Major depressive disorder, recurrent, severe with psychotic features (HCC)- (present on admission)  Assessment & Plan  Geodon, Paxil, Klonopin    Colostomy present (HCC)- (present on admission)  Assessment & Plan  Exploratory laparotomy with colostomy takedown and closure 5/27/2021, no longer present        Hypothyroidism- (present on admission)  Assessment & Plan  Increased Levothyroxine to 125 mcg      SRUTHI (acute kidney injury) (HCC)- (present on admission)  Assessment & Plan  Resolved    Adrenal insufficiency (HCC)- (present on admission)  Assessment & Plan  Cortef 5 mg BID  Plan to decrease to daily on  7/4/2021    Debility- (present on admission)  Assessment & Plan  PT and OT    Suicidal ideation- (present on admission)  Assessment & Plan  Legal hold discontinued 6/4/2021    Orthostatic hypotension- (present on admission)  Assessment & Plan  off Florinef.    Mixed hyperlipidemia- (present on admission)  Assessment & Plan  Atorvastatin.     Lower limb amputation, great toe (HCC)- (present on admission)  Assessment & Plan  Monitor  No signs of infection     Hypomagnesemia- (present on admission)  Assessment & Plan  IV Mg 2 g  Follow level    Obesity due to excess calories with serious comorbidity- (present on admission)  Assessment & Plan  Body mass index is 50.08 kg/m².    Hypokalemia- (present on admission)  Assessment & Plan  Kdur  Follow bmp       VTE prophylaxis: Lovenox

## 2021-07-04 NOTE — PROGRESS NOTES
Bedside report received. Assessment completed.  Pt is A&O x4. Pt on room air.   Medicating for pain PRN per MAR Pain 3/10  Denies nausea.  Skin see flowsheet.       LDA CDI   Last BM 7/4/21,  +flatus, +void.  GI soft diet and 1500mL fluid restriction. Tolerates well.   Pt up with max assist.  Call light and belongings within reach. All needs met at this time. Fall Precautions and hourly rounding in place.

## 2021-07-05 PROCEDURE — 770001 HCHG ROOM/CARE - MED/SURG/GYN PRIV*

## 2021-07-05 PROCEDURE — 700102 HCHG RX REV CODE 250 W/ 637 OVERRIDE(OP): Performed by: INTERNAL MEDICINE

## 2021-07-05 PROCEDURE — 99231 SBSQ HOSP IP/OBS SF/LOW 25: CPT | Performed by: STUDENT IN AN ORGANIZED HEALTH CARE EDUCATION/TRAINING PROGRAM

## 2021-07-05 PROCEDURE — A9270 NON-COVERED ITEM OR SERVICE: HCPCS | Performed by: STUDENT IN AN ORGANIZED HEALTH CARE EDUCATION/TRAINING PROGRAM

## 2021-07-05 PROCEDURE — A9270 NON-COVERED ITEM OR SERVICE: HCPCS | Performed by: INTERNAL MEDICINE

## 2021-07-05 PROCEDURE — 302146: Performed by: STUDENT IN AN ORGANIZED HEALTH CARE EDUCATION/TRAINING PROGRAM

## 2021-07-05 PROCEDURE — 700102 HCHG RX REV CODE 250 W/ 637 OVERRIDE(OP): Performed by: STUDENT IN AN ORGANIZED HEALTH CARE EDUCATION/TRAINING PROGRAM

## 2021-07-05 PROCEDURE — 302098 PASTE RING (FLAT): Performed by: STUDENT IN AN ORGANIZED HEALTH CARE EDUCATION/TRAINING PROGRAM

## 2021-07-05 PROCEDURE — A9270 NON-COVERED ITEM OR SERVICE: HCPCS | Performed by: FAMILY MEDICINE

## 2021-07-05 PROCEDURE — 97605 NEG PRS WND THER DME<=50SQCM: CPT

## 2021-07-05 PROCEDURE — 700102 HCHG RX REV CODE 250 W/ 637 OVERRIDE(OP): Performed by: FAMILY MEDICINE

## 2021-07-05 RX ADMIN — CLONAZEPAM 1 MG: 1 TABLET ORAL at 17:35

## 2021-07-05 RX ADMIN — METOPROLOL TARTRATE 25 MG: 25 TABLET, FILM COATED ORAL at 17:35

## 2021-07-05 RX ADMIN — ATORVASTATIN CALCIUM 40 MG: 40 TABLET, FILM COATED ORAL at 17:35

## 2021-07-05 RX ADMIN — DIPHENOXYLATE HYDROCHLORIDE AND ATROPINE SULFATE 1 TABLET: 2.5; .025 TABLET ORAL at 04:23

## 2021-07-05 RX ADMIN — LEVOTHYROXINE SODIUM 125 MCG: 0.12 TABLET ORAL at 04:23

## 2021-07-05 RX ADMIN — APIXABAN 5 MG: 5 TABLET, FILM COATED ORAL at 04:23

## 2021-07-05 RX ADMIN — CLONAZEPAM 0.5 MG: 0.5 TABLET ORAL at 07:36

## 2021-07-05 RX ADMIN — OMEPRAZOLE 40 MG: 20 CAPSULE, DELAYED RELEASE ORAL at 17:35

## 2021-07-05 RX ADMIN — METOPROLOL TARTRATE 25 MG: 25 TABLET, FILM COATED ORAL at 04:23

## 2021-07-05 RX ADMIN — ZIPRASIDONE HYDROCHLORIDE 60 MG: 60 CAPSULE ORAL at 07:36

## 2021-07-05 RX ADMIN — POTASSIUM CHLORIDE 40 MEQ: 1500 TABLET, EXTENDED RELEASE ORAL at 17:35

## 2021-07-05 RX ADMIN — DIPHENOXYLATE HYDROCHLORIDE AND ATROPINE SULFATE 1 TABLET: 2.5; .025 TABLET ORAL at 17:35

## 2021-07-05 RX ADMIN — PAROXETINE HYDROCHLORIDE 20 MG: 20 TABLET, FILM COATED ORAL at 07:36

## 2021-07-05 RX ADMIN — OXYCODONE 10 MG: 5 TABLET ORAL at 10:46

## 2021-07-05 RX ADMIN — OMEPRAZOLE 40 MG: 20 CAPSULE, DELAYED RELEASE ORAL at 04:23

## 2021-07-05 RX ADMIN — HYDROCORTISONE 5 MG: 5 TABLET ORAL at 04:24

## 2021-07-05 RX ADMIN — OXYCODONE 10 MG: 5 TABLET ORAL at 04:23

## 2021-07-05 RX ADMIN — ZIPRASIDONE HYDROCHLORIDE 60 MG: 60 CAPSULE ORAL at 21:11

## 2021-07-05 RX ADMIN — APIXABAN 5 MG: 5 TABLET, FILM COATED ORAL at 17:35

## 2021-07-05 RX ADMIN — Medication 1000 UNITS: at 04:23

## 2021-07-05 RX ADMIN — AMIODARONE HYDROCHLORIDE 200 MG: 200 TABLET ORAL at 05:11

## 2021-07-05 RX ADMIN — OXYCODONE 10 MG: 5 TABLET ORAL at 00:45

## 2021-07-05 RX ADMIN — HYDROCORTISONE 5 MG: 5 TABLET ORAL at 15:04

## 2021-07-05 RX ADMIN — CALCIUM POLYCARBOPHIL 625 MG: 625 TABLET, FILM COATED ORAL at 07:47

## 2021-07-05 RX ADMIN — POTASSIUM CHLORIDE 40 MEQ: 1500 TABLET, EXTENDED RELEASE ORAL at 04:23

## 2021-07-05 RX ADMIN — OXYCODONE 10 MG: 5 TABLET ORAL at 15:59

## 2021-07-05 RX ADMIN — TAMSULOSIN HYDROCHLORIDE 0.4 MG: 0.4 CAPSULE ORAL at 07:36

## 2021-07-05 RX ADMIN — CALCIUM POLYCARBOPHIL 625 MG: 625 TABLET, FILM COATED ORAL at 17:35

## 2021-07-05 ASSESSMENT — ENCOUNTER SYMPTOMS
ABDOMINAL PAIN: 0
NAUSEA: 0
FEVER: 0
MYALGIAS: 0
SPEECH CHANGE: 0
BLOOD IN STOOL: 0
COUGH: 0
NERVOUS/ANXIOUS: 0
CHILLS: 0
VOMITING: 0
DEPRESSION: 0
DIZZINESS: 0
HEARTBURN: 0
BLURRED VISION: 0
FOCAL WEAKNESS: 0
WEAKNESS: 1
DIARRHEA: 0
SHORTNESS OF BREATH: 0

## 2021-07-05 ASSESSMENT — PAIN DESCRIPTION - PAIN TYPE
TYPE: ACUTE PAIN

## 2021-07-05 NOTE — DISCHARGE PLANNING
Anticipated Discharge Disposition:   SNF    Action:   This RN CM is following the case.  Requested Andrey MOTTA to follow up with Admissions at [facility] if there is a bed available for Pt today.    Barriers to Discharge:   Pending bed availability at [facility]    Plan:   CM to continue to assist Pt with discharge as needed

## 2021-07-05 NOTE — CARE PLAN
Problem: Knowledge Deficit - Standard  Goal: Patient and family/care givers will demonstrate understanding of plan of care, disease process/condition, diagnostic tests and medications  Outcome: Progressing  Note: Pt reports understanding of plan of care and has no questions at this time     Problem: Depression  Goal: Patient and family/caregiver will verbalize accurate information about at least two of the possible causes of depression, three-four of the signs and symptoms of depression  Outcome: Progressing  Note: Pt denies depression and anxiety at this time and states that staff has been helpful for his mentation.    The patient is Stable - Low risk of patient condition declining or worsening    Shift Goals  Clinical Goals: Prevent skin breakdown on bottom  Patient Goals: Pain control  Family Goals: n/a    Progress made toward(s) clinical / shift goals:  Progressing as expected    Patient is not progressing towards the following goals:

## 2021-07-05 NOTE — DISCHARGE PLANNING
Agency/Facility Name: [facility]  Outcome: Left vmail for admissions regarding bed avail     -1025  Agency/Facility Name: [facility]   Spoke To: Sathish  Outcome: Per Sathish, Pt can admit to this facility tomorrow 07/06.  [facility] can provide transport.  Sathish will order a wound vac, and then set up transport for tomorrow and call this DPA back with a time

## 2021-07-05 NOTE — PROGRESS NOTES
[facility] Medicine Daily Progress Note    Date of Service  7/5/2021    Chief Complaint  56 y.o. male admitted 2/9/2021 with Syncope.    Hospital Course  Admitted with syncope, he was noted to be bradycardic and hypotensive.  He has known history of permanent atrial fibrillation and he was on diltiazem, digoxin and metoprolol.  These medications were held.  Later on he was restarted on low-dose Toprol and Digoxin.  He was also started on anticoagulation with Eliquis.  He also had a possible history of adrenal insufficiency, he was already on Florinef and midodrine.  Midodrine was discontinued and he was started on cortisol. Further work-up showed that he had gram-negative bacteremia as well as urinary tract infection and acute kidney injury.  He was initially placed on IV Zosyn, later changed to oral Bactrim to finish the course.  He was carefully hydrated with IV fluids.  On admission he also expressed suicidal ideation.  Psychiatry was consulted the case, he was placed on a legal hold.  He was noted to have severe depression.  He has been placed on medication the form of Geodon, Paxil, Klonopin.  He has a colostomy in place, which the patient believes contributes to his severe depression because of ostomy care.  He had history of bowel perforation and splenic fluid collection requiring Segmental colectomy, colostomy creation, mobilization of splenic flexure, wound VAC placement, irrigation and debridement of flank wound on 11/10/2020.  Surgery was consulted on the case, and he underwent exploratory laparotomy and colostomy takedown and closure on 5/27/2021.  Wound VAC was later placed.  Psychiatry continued to follow the patient, his legal hold was discontinued on 6/4/2021    Interval Problem Update  Ab wound-no issues, no leakage.      Dirrhea-multiple accidents last night. BMS remains in place  7/3/2021:  Patient pending placement in [facility] will follow up on 7/5/2021.  Otherwise denies subjective fevers or  chills continue present medical management.  No acute events overnight.  7/4/2021:  No acute events overnight continue present medical management. Slight adjustment to amiodarone appreciate pharmacy recommendations. Continue to await placement.  7/5/2021:  Patient has been accepted by [facility].  Patient had a fecal management system removed today without issue.  Patient is having formed stools.  Transportation will be set up on 7/6/2021 follow-up with case management that regard.    Consultants/Specialty  Cardiology  Psychiatry  Surgery    Code Status  Full Code    Disposition  [facility]     Review of Systems  Review of Systems   Constitutional: Negative for chills and fever.   HENT: Negative for ear discharge, ear pain, hearing loss and tinnitus.    Eyes: Negative for blurred vision.   Respiratory: Negative for cough and shortness of breath.    Cardiovascular: Negative for chest pain.   Gastrointestinal: Negative for abdominal pain (nearly absent), blood in stool, diarrhea (no clear improvment noted), heartburn, melena, nausea and vomiting.   Genitourinary: Negative for dysuria.   Musculoskeletal: Negative for myalgias.   Neurological: Positive for weakness. Negative for dizziness, speech change and focal weakness.   Psychiatric/Behavioral: Negative for depression. The patient is not nervous/anxious.    All other systems reviewed and are negative.     Physical Exam  Temp:  [36.1 °C (97 °F)-36.9 °C (98.4 °F)] 36.4 °C (97.5 °F)  Pulse:  [80-92] 89  Resp:  [17-18] 17  BP: (106-138)/(63-81) 138/81  SpO2:  [94 %-97 %] 96 %    Physical Exam  Vitals and nursing note reviewed.   Constitutional:       General: He is not in acute distress.     Appearance: He is obese. He is not ill-appearing.      Comments: Weak appearing  'Pale  More bright appearing today and watching Mirna   HENT:      Head: Normocephalic and atraumatic.      Right Ear: External ear normal.      Left Ear: External ear normal.      Nose: No congestion.       Mouth/Throat:      Mouth: Mucous membranes are moist.   Eyes:      General:         Right eye: No discharge.         Left eye: No discharge.      Extraocular Movements: Extraocular movements intact.      Conjunctiva/sclera: Conjunctivae normal.      Pupils: Pupils are equal, round, and reactive to light.   Cardiovascular:      Rate and Rhythm: Normal rate. Rhythm irregularly irregular.   Pulmonary:      Effort: Pulmonary effort is normal. No respiratory distress.      Breath sounds: Normal breath sounds. No wheezing.   Abdominal:      General: There is no distension.      Tenderness: There is no abdominal tenderness.      Comments: Wound vac-good seal observed  Body mass index is 50.08 kg/m²   Genitourinary:     Comments: BMS in place, liquid brown stool observed, remains without interval changes today  Musculoskeletal:      Cervical back: No rigidity. No muscular tenderness.      Right lower leg: Edema present.      Left lower leg: Edema present.   Skin:     General: Skin is warm and dry.      Coloration: Skin is pale. Skin is not jaundiced.      Findings: No bruising.   Neurological:      General: No focal deficit present.      Mental Status: He is alert and oriented to person, place, and time.      Cranial Nerves: No cranial nerve deficit.   Psychiatric:         Mood and Affect: Mood is not anxious or depressed.         Thought Content: Thought content is not paranoid or delusional. Thought content does not include homicidal or suicidal ideation. Thought content does not include homicidal or suicidal plan.       Fluids    Intake/Output Summary (Last 24 hours) at 7/5/2021 1627  Last data filed at 7/5/2021 1230  Gross per 24 hour   Intake 720 ml   Output 1050 ml   Net -330 ml       Laboratory                        Imaging  CT-ABDOMEN-PELVIS W/O   Final Result         1. Liquid stool in the distal colon, consistent with diarrhea.   2. Unchanged perirectal fat stranding, sequela of nonspecific proctitis.   3.  Cholelithiasis. No cholecystitis.   4. Decreased blood pool attenuation, suggesting anemia.   5. Chronic T11 and T12 compression fractures.      US-RENAL   Final Result      1.  Low-level echogenic debris in the urinary bladder is noted which could indicate UTI or blood within the bladder.      2.  No hydronephrosis.      IR-MIDLINE CATHETER INSERTION WO GUIDANCE > AGE 3   Final Result                  Ultrasound-guided midline placement performed by qualified nursing staff    as above.          EC-ECHOCARDIOGRAM COMPLETE W/ CONT   Final Result      DX-CHEST-PORTABLE (1 VIEW)   Final Result      1.  Minor asymmetric interstitial opacity in the right lower lobe which could represent minor interstitial edema, pneumonia, or fibrotic change.      2.  No left lung consolidation.      3.  No evidence of congestive failure.      [id number] VIEW   Final Result         No specific finding to suggest small bowel obstruction.      [id number] VIEW   Final Result         No significant interval change.      DX-CHEST-LIMITED (1 VIEW)   Final Result         Diffuse interstitial prominence could relate to mild pulmonary edema or atypical infection      [id number] VIEW   Final Result      Increased colonic gas without definite bowel obstruction.      US-RENAL   Final Result      No evidence of hydronephrosis.      Lobulated kidneys bilaterally.      CT-ABDOMEN-PELVIS WITH   Final Result      1.  There is no evidence of small bowel obstruction.   2.  There is a left lower quadrant ostomy.   3.  There is fluid distention of the colon distal to the surgical material in the left mid descending colon extending all the way to the rectum. There is no pneumatosis or free air.   4.  There is cholelithiasis without biliary dilatation.   5.  There has been interval removal of the drainage catheter anterior to the spleen with minimal hypodense area in the anterior spleen again noted. There is no new fluid collection.      IR-US GUIDED PIV    Final Result    Ultrasound-guided PERIPHERAL IV INSERTION performed by    qualified nursing staff as above.      EC-ECHOCARDIOGRAM COMPLETE W/O CONT   Final Result      DX-LUMBAR SPINE-2 OR 3 VIEWS   Final Result      Moderate compression deformity of T11 is new compared to 2018.      Mild wedge deformity of T12 is unchanged.      Degenerative changes including facet arthropathy.      Mild retrolisthesis of L5 on S1 and L3 on L4.              Assessment/Plan  Diarrhea- (present on admission)  Assessment & Plan  C diff negative  Check stool wbc and culture-negative  Fecal lactoferrin positive, indicating presence of fecal leukocytes (had prior bowel surgery, doubtful it is IBD), monitor  -check celiac sprue panel-pending  Check CT abd/pelvis, negative for acute findings  -will trial low dose lomotil (will schedule for short period), BMS in place  -will add metamucil bulking agent and now on fibercon scheduled  -unclear etiology  -check fecal fat-pending    Normocytic anemia- (present on admission)  Assessment & Plan  Follow cbc    Urinary tract infection- (present on admission)  Assessment & Plan  Completed 10-day course of Bactrim    Leukocytosis- (present on admission)  Assessment & Plan  Follow cbc    Obstructive uropathy- (present on admission)  Assessment & Plan  resolved    Gram-negative bacteremia- (present on admission)  Assessment & Plan  Completed 10-day course of Bactrim    Permanent atrial fibrillation (HCC)- (present on admission)  Assessment & Plan  Metoprolol, Eliquis    Chronic venous insufficiency of lower extremity- (present on admission)  Assessment & Plan  compression stockings.    Chronic heart failure with preserved ejection fraction (HCC)- (present on admission)  Assessment & Plan  Metoprolol, Lasix  Fluid restriction of 1.5 L per day      Type 2 diabetes mellitus with hyperglycemia, with long-term current use of insulin (HCC)- (present on admission)  Assessment & Plan  Follow bmp    Major  depressive disorder, recurrent, severe with psychotic features (HCC)- (present on admission)  Assessment & Plan  Geodon, Paxil, Klonopin    Colostomy present (HCC)- (present on admission)  Assessment & Plan  Exploratory laparotomy with colostomy takedown and closure 5/27/2021, no longer present        Hypothyroidism- (present on admission)  Assessment & Plan  Increased Levothyroxine to 125 mcg      SRUTHI (acute kidney injury) (HCC)- (present on admission)  Assessment & Plan  Resolved    Adrenal insufficiency (HCC)- (present on admission)  Assessment & Plan  Cortef 5 mg BID  Plan to decrease to daily on 7/4/2021    Debility- (present on admission)  Assessment & Plan  PT and OT    Suicidal ideation- (present on admission)  Assessment & Plan  Legal hold discontinued 6/4/2021    Orthostatic hypotension- (present on admission)  Assessment & Plan  off Florinef.    Mixed hyperlipidemia- (present on admission)  Assessment & Plan  Atorvastatin.     Lower limb amputation, great toe (HCC)- (present on admission)  Assessment & Plan  Monitor  No signs of infection     Hypomagnesemia- (present on admission)  Assessment & Plan  IV Mg 2 g  Follow level    Obesity due to excess calories with serious comorbidity- (present on admission)  Assessment & Plan  Body mass index is 50.08 kg/m².    Hypokalemia- (present on admission)  Assessment & Plan  Kdur  Follow bmp       VTE prophylaxis: Lovenox

## 2021-07-05 NOTE — CARE PLAN
Problem: Provide Safe Environment  Goal: Suicide environmental safety, protocols, policies, and practices will be implemented  Outcome: Progressing     Problem: Fall Risk  Goal: Patient will remain free from falls  Outcome: Progressing     Problem: Pain - Standard  Goal: Alleviation of pain or a reduction in pain to the patient’s comfort goal  Outcome: Progressing     The patient is Stable - Low risk of patient condition declining or worsening    Shift Goals  Clinical Goals: Prevent skin breakdown on bottom  Patient Goals: Pain control  Family Goals: n/a

## 2021-07-05 NOTE — PROGRESS NOTES
Pt is laying in bed, call light within reach, bed lowered and locked, fall education reinforced. Pt is A&Ox4 and on room air. Pt lung sounds are diminished in the lower lobes, bowel sounds are hyperactive in all four quadrants, heart sounds are within defined limits. Pt IV is clean,dry,intact,and patent and saline locked. Pt is up x2 to stand. Pt has a MLI with wound vac in place CDI. Pt has excoriation to inner thighs and bilateral buttocks. BMS system in place with mild leaking

## 2021-07-05 NOTE — WOUND TEAM
[facility]  Inpatient Services  Wound and Skin Care Progress Note    Admission Date: 2/9/2021     Last order of IP CONSULT TO WOUND CARE was found on 3/18/2021 from Hospital Encounter on 2/9/2021     HPI, PMH, SH: Reviewed    Past Surgical History:   Procedure Laterality Date   • PB EXPLORATORY OF ABDOMEN  5/27/2021    Procedure: LAPAROTOMY, EXPLORATORY;  Surgeon: Kin Desouza D.O.;  Location: [facility];  Service: General   • COLOSTOMY TAKEDOWN  5/27/2021    Procedure: COLOSTOMY TAKEDOWN AND CLOSURE;  Surgeon: Kin Desouza D.O.;  Location: SURGERY [facility];  Service: General   • COLECTOMY N/A 11/10/2020    Procedure: COLECTOMY-SEGMENTAL, COLOSTOMY, MOBILIZATION OF SPLENIC FLEXURE, WOUND VAC PLACEMENT, IRRIGATION AND DEBRIDMENT FLANK WOUND;  Surgeon: Kin Desouza D.O.;  Location: [facility];  Service: General   • ZZZ CARDIAC CATH  07/21/2018    EF 45%, normal coronaries.   • IRRIGATION & DEBRIDEMENT ORTHO Right 2/19/2018    Procedure: IRRIGATION & DEBRIDEMENT ORTHO-FOOT;  Surgeon: Kirby Lopez M.D.;  Location: [facility];  Service: Orthopedics   • TOE AMPUTATION Right 1/17/2018    Procedure: TOE AMPUTATION-1ST RAY;  Surgeon: Doug Delong M.D.;  Location: [facility];  Service: Orthopedics   • TOE AMPUTATION Right 11/10/2017    Procedure: TOE AMPUTATION;  Surgeon: Osorio Leo M.D.;  Location: [facility];  Service: Orthopedics     Social History     Tobacco Use   • Smoking status: Never Smoker   • Smokeless tobacco: Never Used   Substance Use Topics   • Alcohol use: No     Chief Complaint   Patient presents with   • Syncope     X1 this morning when standing up to go to bathroom     Diagnosis: Syncope and collapse [R55]  Suicidal ideation [R45.851]  Sepsis due to Klebsiella pneumoniae (HCC) [A41.4]    Unit where seen by Wound Team: T418/00     WOUND CONSULT/FOLLOW UP RELATED TO: abdominal wound    WOUND HISTORY:  Pt is an older  gentleman well known to [facility] wound team 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 Pt currently admitted since 2/9/21 related to SI concerns due to body image issues related to colostomy.      *Pt underwent surgical reversal of colostomy with Dr. Desouza on 5/20/21. MLI was re-opened at that time and was then closed with interrupted staples and packing. Unfortunately MLI continued to Dehisce and therefore wound team was consulted to further manage MLI with dakins packing.     6/3 Wound vac placed    WOUND ASSESSMENT/LDA     Negative Pressure Wound Therapy 06/03/21 Abdomen (Active)   Vacuum Serial Number [id number] 06/26/21 2054   NPWT Pump Mode / Pressure Setting Ulta;Continuous;125 mmHg 07/05/21 1600   Dressing Type Small;Black Foam (Regular) 07/05/21 1600   Number of Foam Pieces Used 1 07/05/21 1600   Canister Changed No 07/03/21 0840   Output (mL) 25 mL 07/04/21 1600   NEXT Dressing Change/Treatment Date 07/07/21 07/05/21 1600   VAC VeraFlo Irrigant Normal Saline 06/25/21 1330   VAC VeraFlo Soak Time (mins) 0 06/28/21 1200   VAC VeraFlo Instill Volume (ml) 0 06/28/21 1200   VAC VeraFlo - Therapy Time (hrs) 0 06/28/21 1200   VAC VeraFlo Pressure (mm/Hg) Continuous;125 mmHg 07/02/21 1000   WOUND NURSE ONLY - Time Spent with Patient (mins) 45 07/05/21 1600           Wound 05/27/21 Full Thickness Wound Abdomen (Active)   Wound Image   07/05/21 1600   Site Assessment Red 07/05/21 1600   Periwound Assessment Clean;Dry;Intact 07/05/21 1600   Margins Defined edges 07/05/21 1600   Closure Secondary intention 07/05/21 1600   Drainage Amount Scant 07/05/21 1600   Drainage Description Serous;Serosanguineous 07/05/21 1600   Treatments Cleansed;Site care 07/05/21 1600   Wound Cleansing Approved Wound Cleanser 07/05/21 1600   Periwound Protectant Skin Protectant Wipes to Periwound;Drape 07/05/21 1600   Dressing Cleansing/Solutions Not Applicable 07/05/21 1600   Dressing Options Wound Vac 07/05/21 1600    Dressing Changed Changed 07/05/21 1600   Dressing Status Clean;Dry;Intact 07/05/21 1600   Dressing Change/Treatment Frequency Monday, Wednesday, Friday, and As Needed 07/05/21 1600   NEXT Dressing Change/Treatment Date 07/07/21 07/05/21 1600   NEXT Weekly Photo (Inpatient Only) 07/12/21 07/05/21 1600   Non-staged Wound Description Full thickness 07/05/21 1600   Wound Length (cm) 11.8 cm 07/05/21 1600   Wound Width (cm) 4.6 cm 07/05/21 1600   Wound Depth (cm) 1 cm 07/05/21 1600   Wound Surface Area (cm^2) 54.28 cm^2 07/05/21 1600   Wound Volume (cm^3) 54.28 cm^3 07/05/21 1600   Wound Healing % 34 07/05/21 1600   Tunneling (cm) 0 cm 06/30/21 1200   Tunneling Clock Position of Wound 12 06/07/21 1550   Tunneling - 2nd Location (cm) 0 cm 06/30/21 1200   Tunneling Clock Position of Wound - 2nd Location 6 06/07/21 1550   Undermining (cm) 0 cm 06/30/21 1200   Undermining of Wound, 1st Location From 1 o'clock;To 2 o'clock 06/25/21 1330   Shape linear irregular 07/05/21 1600   Wound Odor None 07/05/21 1600   Pulses N/A 07/02/21 1000   Exposed Structures Fascia 07/05/21 1600   WOUND NURSE ONLY - Time Spent with Patient (mins) 45 07/05/21 1600     Vascular:    SHAYAN:   No results found.    Lab Values:    Lab Results   Component Value Date/Time    WBC 10.2 06/29/2021 06:01 AM    RBC 3.76 (L) 06/29/2021 06:01 AM    HEMOGLOBIN 10.5 (L) 06/29/2021 06:01 AM    HEMATOCRIT 34.4 (L) 06/29/2021 06:01 AM    CREACTPROT 0.48 06/10/2021 04:05 AM    SEDRATEWES 13 10/23/2018 08:02 AM    HBA1C 5.2 02/14/2021 01:30 AM      Culture Results show:  No results found for this or any previous visit (from the past 720 hour(s)).    Pain Level/Medicated:  Medicated with IV Dilaudid      INTERVENTIONS BY WOUND TEAM:  Chart and images reviewed. Discussed with bedside RN. All areas of concern (based on picture review, LDA review and discussion with bedside RN) have been thoroughly assessed. Documentation of areas based on significant findings. This RN in  to assess patient. Performed standard wound care which includes appropriate positioning, dressing removal and non-selective debridement. Pictures and measurements obtained weekly if/when required.              ABD:   Preparation for Dressing removal:  Removed without difficulty.    Cleansed with: Wound cleanser and gauze   Sharp debridement: NA  Joya wound: Cleansed with Wound cleanser and gauze, No sting skin and strips of drape. Small piece of mepitel one applied to small partial thickness skin tears x3.   Primary Dressin piece of half thickness black foam applied to wound bed. Edges pulled together and secured with drape.   Secondary (Outer) Dressing:  Sealed with drape, and TRAC pad applied.     Interdisciplinary consultation: Patient, Bedside RN     EVALUATION / RATIONALE FOR TREATMENT:  Most Recent Date:    21: Wound bed clean and red with granular tissue. Continue NPWT.   21: Wound continues to progress with significantly less slough and wound is decreasing in size. Continued with NPWT.  21: Wound continues to have granular tissue. Pt heading to CT scan today. Wound has decreased in width significantly since dc of veraflo. Continued with NPWT.  21: wound continues to have granulation tissue.  Left abd with some protuding, Dr. natarajan updated and assessed.     21: wound continues to progress, transitioned to regular vac and continued to pull edges together.   21: Edges pulled together with jones by Dr. Rooney during change on Friday. Discussed with Lyly today. MD unable to be bedside today. Will continue with veraflo hopefully will be able to transition to regular vac at next visit to further assist with edges being pulled together. Meet with Dr. Rooney on Wednesday at 8am to assess wound.  21: continuing to decrease in slough in wound bed. One suture towards proximal end visible, per MD rooney, leave in place but monitor. Staples added to superior and inferior  "ends to assist with closure by MD. Continuing to pull edges together, MD will reassess on Monday with wound team to monitor for improvement and decide if surgical I&D is needed or if appropriate to switch to regular NPWT dressing instead of VF.   6/16/21: Wound with decreased slough. Wound appears to be \"flattening out.\" Veraflo is assisting with cleansing wound but is making pulling edges together difficult. Discussed with Dr. Desouza. Will plan to Meet with Dr. Desouza on Friday at 8am to assess wound, Dr. Desouza to pull superior and inferior borders together and staple to assist with closure.  6/14/21: Wound continues to granulate. Small amount of slough over fascial line. Discussed with Dr. Desouza. Plan to continue with veraflo, settings decreased. Will evaluate the wound with Dr. Desouza on Wednesday at 9am. Per MD may consider OR washout with staples to distal proximal aspect to assist in wound closure vs. Adding staples at bedside.   06/11/21:  Wound looks good, minimal slough mainly to fascial line sutures.  VF grey foam layer to assist in debridement of slough.  Dr. Desouza to re-assess Monday and do surgical debridement if needed next week.   06/09/21:  Wound continues to progress, pull edges together to lessen width of wound. Dr. Desouza would like to see wound on Friday 6/7: Pt's wound appears to be improving per measurements and pictures. There is granulation tissue present.   06/5/21: Wound bed improving with increased granulation tissue.  Wound edges approximated prior to drape application.  NPWT continued.    06/03/21:  Fascial sutures intact, wound VAC placed with DR. Desouza.    6/1/21: Wound appears  than yesterdays pictures. Discussed with Dr. Desouza, plan to continue dakins TID for 48hrs, CT scan ordered today. Will re-evaluate on Thursday for potential vac placement.   5/31/21: Wound initially with interrupted staples and packing now with dehiscence. Per Dr. Baeza request wound team " initiated dakins packing. Pt may benefit from NPWT to assist with closure.   5/8/21: wound nearly healed. Does not require advanced wound care. Wound team signing off and nursing to re consult if site worsens. Continue with dressing care per order.  4/21/21: abd wound improving. Nursing to continue dsg changes per order.  4/15: Right posterior thigh DTI now resolved. Pink and blanching. Abdominal wound to surface level continuing with dressing POC.  4/7/21: Thigh wound nearly healed and will likely be resolved at next assessment. Abdomen wound appears larger. Continued with Arti to move through inflammatory phase. Hydrofera blue for its bacteriostatic properties and to assist in drainage management.   03/31/21: thigh wound healing well now to a stage 2.  Continue current plan.  All interventions in place.   ABD more denuded and bloody.  Arti to assist in moving wound through inflammatory phase.   3/23/21: Right posterior thigh wound remains a DTI but appears to be evolving into a stage 2. Wound team was previously consulted regarding sacral wound which appeared to be a skin tear however skin now appears to be intact however discolored. Does not appear to be pressure related as pt is on a waffle, self mobile and sacral mepilex is in use. Area is also not a normal pressure injury shape. Wound team will continue to follow.   3/18/21: Pt with linear DTIs to R posterior thigh from lying on top of frye cath tubing. Offloading precautions ordered. Wound team to continue following.     Goals: Steady decrease in wound area and depth weekly.    WOUND TEAM PLAN OF CARE ([X] for frequency of wound follow up,):   Nursing to follow orders written for wound care. Contact wound team if area fails to progress, deteriorates or with any questions/concerns  Dressing changes by wound team:                   Follow up 3 times weekly:                NPWT change 3 times weekly:   X  Follow up 1-2 times weekly:     Follow up  Bi-Monthly:                   Follow up as needed:   Other (explain):     NURSING PLAN OF CARE ORDERS (X):  Dressing changes: See Dressing Care orders: X  Skin care: See Skin Care orders: x  RN Prevention Protocol: x  Rectal tube care: See Rectal Tube Care orders:   Other orders:     Anticipated discharge plans:   LTACH:        SNF/Rehab:                  Home Health Care:           Outpatient Wound Center:            Self/Family Care:        Other:   Group Home

## 2021-07-06 LAB
ALBUMIN SERPL BCP-MCNC: 2.6 G/DL (ref 3.2–4.9)
ALBUMIN/GLOB SERPL: 0.7 G/DL
ALP SERPL-CCNC: 112 U/L (ref 30–99)
ALT SERPL-CCNC: 13 U/L (ref 2–50)
ANION GAP SERPL CALC-SCNC: 7 MMOL/L (ref 7–16)
AST SERPL-CCNC: 18 U/L (ref 12–45)
BASOPHILS # BLD AUTO: 0.6 % (ref 0–1.8)
BASOPHILS # BLD: 0.06 K/UL (ref 0–0.12)
BILIRUB SERPL-MCNC: 0.3 MG/DL (ref 0.1–1.5)
BUN SERPL-MCNC: 28 MG/DL (ref 8–22)
CALCIUM SERPL-MCNC: 8.8 MG/DL (ref 8.5–10.5)
CHLORIDE SERPL-SCNC: 103 MMOL/L (ref 96–112)
CO2 SERPL-SCNC: 25 MMOL/L (ref 20–33)
CREAT SERPL-MCNC: 1.19 MG/DL (ref 0.5–1.4)
EOSINOPHIL # BLD AUTO: 0.37 K/UL (ref 0–0.51)
EOSINOPHIL NFR BLD: 3.9 % (ref 0–6.9)
ERYTHROCYTE [DISTWIDTH] IN BLOOD BY AUTOMATED COUNT: 50.3 FL (ref 35.9–50)
GLOBULIN SER CALC-MCNC: 3.6 G/DL (ref 1.9–3.5)
GLUCOSE SERPL-MCNC: 112 MG/DL (ref 65–99)
HCT VFR BLD AUTO: 31.1 % (ref 42–52)
HGB BLD-MCNC: 9.8 G/DL (ref 14–18)
IMM GRANULOCYTES # BLD AUTO: 0.04 K/UL (ref 0–0.11)
IMM GRANULOCYTES NFR BLD AUTO: 0.4 % (ref 0–0.9)
LYMPHOCYTES # BLD AUTO: 2.31 K/UL (ref 1–4.8)
LYMPHOCYTES NFR BLD: 24.1 % (ref 22–41)
MAGNESIUM SERPL-MCNC: 1.3 MG/DL (ref 1.5–2.5)
MCH RBC QN AUTO: 28.6 PG (ref 27–33)
MCHC RBC AUTO-ENTMCNC: 31.5 G/DL (ref 33.7–35.3)
MCV RBC AUTO: 90.7 FL (ref 81.4–97.8)
MONOCYTES # BLD AUTO: 0.81 K/UL (ref 0–0.85)
MONOCYTES NFR BLD AUTO: 8.5 % (ref 0–13.4)
NEUTROPHILS # BLD AUTO: 5.99 K/UL (ref 1.82–7.42)
NEUTROPHILS NFR BLD: 62.5 % (ref 44–72)
NRBC # BLD AUTO: 0 K/UL
NRBC BLD-RTO: 0 /100 WBC
PLATELET # BLD AUTO: 266 K/UL (ref 164–446)
PMV BLD AUTO: 10.5 FL (ref 9–12.9)
POTASSIUM SERPL-SCNC: 5.4 MMOL/L (ref 3.6–5.5)
PROT SERPL-MCNC: 6.2 G/DL (ref 6–8.2)
RBC # BLD AUTO: 3.43 M/UL (ref 4.7–6.1)
SODIUM SERPL-SCNC: 135 MMOL/L (ref 135–145)
TSH SERPL DL<=0.005 MIU/L-ACNC: 5.93 UIU/ML (ref 0.38–5.33)
WBC # BLD AUTO: 9.6 K/UL (ref 4.8–10.8)

## 2021-07-06 PROCEDURE — 80053 COMPREHEN METABOLIC PANEL: CPT

## 2021-07-06 PROCEDURE — 700102 HCHG RX REV CODE 250 W/ 637 OVERRIDE(OP): Performed by: INTERNAL MEDICINE

## 2021-07-06 PROCEDURE — 99233 SBSQ HOSP IP/OBS HIGH 50: CPT | Performed by: FAMILY MEDICINE

## 2021-07-06 PROCEDURE — 83735 ASSAY OF MAGNESIUM: CPT

## 2021-07-06 PROCEDURE — A9270 NON-COVERED ITEM OR SERVICE: HCPCS | Performed by: INTERNAL MEDICINE

## 2021-07-06 PROCEDURE — 97530 THERAPEUTIC ACTIVITIES: CPT

## 2021-07-06 PROCEDURE — 97116 GAIT TRAINING THERAPY: CPT

## 2021-07-06 PROCEDURE — 770001 HCHG ROOM/CARE - MED/SURG/GYN PRIV*

## 2021-07-06 PROCEDURE — A9270 NON-COVERED ITEM OR SERVICE: HCPCS | Performed by: STUDENT IN AN ORGANIZED HEALTH CARE EDUCATION/TRAINING PROGRAM

## 2021-07-06 PROCEDURE — 700111 HCHG RX REV CODE 636 W/ 250 OVERRIDE (IP): Performed by: FAMILY MEDICINE

## 2021-07-06 PROCEDURE — 85025 COMPLETE CBC W/AUTO DIFF WBC: CPT

## 2021-07-06 PROCEDURE — 90791 PSYCH DIAGNOSTIC EVALUATION: CPT | Performed by: SOCIAL WORKER

## 2021-07-06 PROCEDURE — 84443 ASSAY THYROID STIM HORMONE: CPT

## 2021-07-06 PROCEDURE — 36415 COLL VENOUS BLD VENIPUNCTURE: CPT

## 2021-07-06 PROCEDURE — A9270 NON-COVERED ITEM OR SERVICE: HCPCS | Performed by: FAMILY MEDICINE

## 2021-07-06 PROCEDURE — 700102 HCHG RX REV CODE 250 W/ 637 OVERRIDE(OP): Performed by: STUDENT IN AN ORGANIZED HEALTH CARE EDUCATION/TRAINING PROGRAM

## 2021-07-06 PROCEDURE — 97535 SELF CARE MNGMENT TRAINING: CPT

## 2021-07-06 PROCEDURE — 302146: Performed by: STUDENT IN AN ORGANIZED HEALTH CARE EDUCATION/TRAINING PROGRAM

## 2021-07-06 PROCEDURE — 700102 HCHG RX REV CODE 250 W/ 637 OVERRIDE(OP): Performed by: FAMILY MEDICINE

## 2021-07-06 RX ORDER — HYDROCORTISONE 5 MG/1
5 TABLET ORAL DAILY
Status: DISCONTINUED | OUTPATIENT
Start: 2021-07-07 | End: 2021-07-09 | Stop reason: HOSPADM

## 2021-07-06 RX ORDER — AMIODARONE HYDROCHLORIDE 200 MG/1
200 TABLET ORAL DAILY
Qty: 30 TABLET | Status: SHIPPED
Start: 2021-07-07 | End: 2022-08-29 | Stop reason: SDUPTHER

## 2021-07-06 RX ORDER — CALCIUM POLYCARBOPHIL 625 MG
625 TABLET ORAL 2 TIMES DAILY WITH MEALS
Qty: 30 TABLET | Status: SHIPPED
Start: 2021-07-06 | End: 2022-11-05

## 2021-07-06 RX ORDER — CLONAZEPAM 0.5 MG/1
TABLET ORAL
Qty: 9 TABLET | Refills: 0 | Status: SHIPPED | OUTPATIENT
Start: 2021-07-06 | End: 2021-07-09 | Stop reason: SDUPTHER

## 2021-07-06 RX ORDER — OMEPRAZOLE 40 MG/1
40 CAPSULE, DELAYED RELEASE ORAL EVERY 12 HOURS
Qty: 30 CAPSULE | Status: SHIPPED
Start: 2021-07-06

## 2021-07-06 RX ORDER — LEVOTHYROXINE SODIUM 0.15 MG/1
150 TABLET ORAL
Qty: 30 TABLET | Status: SHIPPED
Start: 2021-07-07

## 2021-07-06 RX ORDER — OXYCODONE HYDROCHLORIDE 10 MG/1
10 TABLET ORAL EVERY 6 HOURS PRN
Qty: 12 TABLET | Refills: 0 | Status: SHIPPED | OUTPATIENT
Start: 2021-07-06 | End: 2021-07-09 | Stop reason: SDUPTHER

## 2021-07-06 RX ORDER — TAMSULOSIN HYDROCHLORIDE 0.4 MG/1
0.4 CAPSULE ORAL
Qty: 30 CAPSULE | Status: SHIPPED
Start: 2021-07-07

## 2021-07-06 RX ORDER — MAGNESIUM SULFATE HEPTAHYDRATE 40 MG/ML
2 INJECTION, SOLUTION INTRAVENOUS ONCE
Status: COMPLETED | OUTPATIENT
Start: 2021-07-06 | End: 2021-07-06

## 2021-07-06 RX ADMIN — OXYCODONE 10 MG: 5 TABLET ORAL at 16:08

## 2021-07-06 RX ADMIN — POTASSIUM CHLORIDE 40 MEQ: 1500 TABLET, EXTENDED RELEASE ORAL at 04:30

## 2021-07-06 RX ADMIN — LEVOTHYROXINE SODIUM 125 MCG: 0.12 TABLET ORAL at 04:30

## 2021-07-06 RX ADMIN — TAMSULOSIN HYDROCHLORIDE 0.4 MG: 0.4 CAPSULE ORAL at 08:47

## 2021-07-06 RX ADMIN — CALCIUM POLYCARBOPHIL 625 MG: 625 TABLET, FILM COATED ORAL at 17:13

## 2021-07-06 RX ADMIN — CLONAZEPAM 0.5 MG: 0.5 TABLET ORAL at 07:21

## 2021-07-06 RX ADMIN — APIXABAN 5 MG: 5 TABLET, FILM COATED ORAL at 04:30

## 2021-07-06 RX ADMIN — APIXABAN 5 MG: 5 TABLET, FILM COATED ORAL at 17:13

## 2021-07-06 RX ADMIN — ATORVASTATIN CALCIUM 40 MG: 40 TABLET, FILM COATED ORAL at 17:13

## 2021-07-06 RX ADMIN — OMEPRAZOLE 40 MG: 20 CAPSULE, DELAYED RELEASE ORAL at 17:13

## 2021-07-06 RX ADMIN — MAGNESIUM SULFATE 2 G: 2 INJECTION INTRAVENOUS at 16:08

## 2021-07-06 RX ADMIN — METOPROLOL TARTRATE 25 MG: 25 TABLET, FILM COATED ORAL at 17:13

## 2021-07-06 RX ADMIN — CALCIUM POLYCARBOPHIL 625 MG: 625 TABLET, FILM COATED ORAL at 07:20

## 2021-07-06 RX ADMIN — HYDROCORTISONE 5 MG: 5 TABLET ORAL at 04:30

## 2021-07-06 RX ADMIN — CLONAZEPAM 1 MG: 1 TABLET ORAL at 17:13

## 2021-07-06 RX ADMIN — ZIPRASIDONE HYDROCHLORIDE 60 MG: 60 CAPSULE ORAL at 20:06

## 2021-07-06 RX ADMIN — METOPROLOL TARTRATE 25 MG: 25 TABLET, FILM COATED ORAL at 04:30

## 2021-07-06 RX ADMIN — OMEPRAZOLE 40 MG: 20 CAPSULE, DELAYED RELEASE ORAL at 04:30

## 2021-07-06 RX ADMIN — Medication 1000 UNITS: at 04:30

## 2021-07-06 RX ADMIN — AMIODARONE HYDROCHLORIDE 200 MG: 200 TABLET ORAL at 04:30

## 2021-07-06 RX ADMIN — ZIPRASIDONE HYDROCHLORIDE 60 MG: 60 CAPSULE ORAL at 08:47

## 2021-07-06 RX ADMIN — DIPHENOXYLATE HYDROCHLORIDE AND ATROPINE SULFATE 1 TABLET: 2.5; .025 TABLET ORAL at 04:30

## 2021-07-06 RX ADMIN — PAROXETINE HYDROCHLORIDE 20 MG: 20 TABLET, FILM COATED ORAL at 07:21

## 2021-07-06 ASSESSMENT — ENCOUNTER SYMPTOMS
DEPRESSION: 1
BACK PAIN: 0
CHILLS: 0
FEVER: 0
NAUSEA: 0
COUGH: 0
ABDOMINAL PAIN: 1
HEADACHES: 0
MYALGIAS: 0
WHEEZING: 0
HALLUCINATIONS: 0
FLANK PAIN: 0
WEAKNESS: 0
NERVOUS/ANXIOUS: 0
HEARTBURN: 0
PALPITATIONS: 0
SPEECH CHANGE: 0
BLURRED VISION: 0
FOCAL WEAKNESS: 0
DIAPHORESIS: 0
DIARRHEA: 1
SENSORY CHANGE: 0
NECK PAIN: 0
BLOOD IN STOOL: 0
SHORTNESS OF BREATH: 0
DIZZINESS: 0
SORE THROAT: 0
VOMITING: 0

## 2021-07-06 ASSESSMENT — COGNITIVE AND FUNCTIONAL STATUS - GENERAL
MOBILITY SCORE: 8
DRESSING REGULAR LOWER BODY CLOTHING: A LOT
TURNING FROM BACK TO SIDE WHILE IN FLAT BAD: UNABLE
MOVING TO AND FROM BED TO CHAIR: UNABLE
STANDING UP FROM CHAIR USING ARMS: A LOT
MOVING FROM LYING ON BACK TO SITTING ON SIDE OF FLAT BED: UNABLE
HELP NEEDED FOR BATHING: A LOT
TOILETING: A LOT
PERSONAL GROOMING: A LITTLE
DAILY ACTIVITIY SCORE: 16
DRESSING REGULAR UPPER BODY CLOTHING: A LITTLE
SUGGESTED CMS G CODE MODIFIER MOBILITY: CM
SUGGESTED CMS G CODE MODIFIER DAILY ACTIVITY: CK
CLIMB 3 TO 5 STEPS WITH RAILING: TOTAL
WALKING IN HOSPITAL ROOM: A LOT

## 2021-07-06 ASSESSMENT — PAIN DESCRIPTION - PAIN TYPE
TYPE: ACUTE PAIN

## 2021-07-06 ASSESSMENT — GAIT ASSESSMENTS
DISTANCE (FEET): 10
GAIT LEVEL OF ASSIST: SUPERVISED
DEVIATION: BRADYKINETIC;SHUFFLED GAIT
ASSISTIVE DEVICE: 4 WHEEL WALKER

## 2021-07-06 NOTE — PROGRESS NOTES
Bedside report received.  Assessment complete.  A&O x 4. Patient calls appropriately. VSS on RA.  Patient is a max assist. Bed alarm on.   Pt reports no pain at this time. Discussed PRN medications if needed.  Denies N&V. Tolerating GI soft diet with 1500 mL fluid restriction.  MLI with wound vac in place, CDI.  + void, + flatus, last BM 07/05.  Patient denies SOB.  SCD's on.  Excoriation to inner thighs and buttocks. Barrier cream and barrier wipes used.  Review plan with of care with patient. Call light and personal belongings with in reach. Hourly rounding in place. All needs met at this time.

## 2021-07-06 NOTE — PROGRESS NOTES
"Pt informed CNA that he has been having suicidal thoughts.  Pt said \"he has been thinking about for a week and that he plans on hanging himself\".  CNA informed RN and the checked on Pt again and CNA seen him rolling blankets and asked what he was doing, Pt stated that \"he is going to hang himself\" CNA took away blankets and sat with him until a sitter was provided.       "

## 2021-07-06 NOTE — PROGRESS NOTES
[facility] Medicine Daily Progress Note    Date of Service  7/6/2021    Chief Complaint  Sebastián Castro is a 56 y.o. male admitted 2/9/2021 with Syncope.    Hospital Course  Admitted with Syncope, he was noted to be bradycardic and hypotensive.  He has known history of permanent atrial fibrillation and he was on diltiazem, digoxin and metoprolol. These medications were held.  Later on he was restarted on low-dose Toprol and Digoxin. He was also started on anticoagulation with Eliquis.  He also had possible history of adrenal insufficiency, he was already on Florinef and midodrine.  Midodrine was discontinued and he was started on Cortisol. Further work-up showed that he had Gram-negative bacteremia as well as Urinary tract infection and Acute kidney injury.  He was initially placed on IV Zosyn, later changed to oral Bactrim to finish the course.  He was carefully hydrated with IV fluids. On admission he also expressed suicidal ideation. Psychiatry was consulted on the case, he was placed on a legal hold.  He was noted to have severe depression.  He has been placed on medication in the form of Geodon, Paxil, Klonopin. He has a colostomy in place, which the patient believes contributes to his severe depression because of ostomy care.  He had history of bowel perforation and splenic fluid collection requiring Segmental colectomy, colostomy creation, mobilization of splenic flexure, wound VAC placement, irrigation and debridement of flank wound on 11/10/2020.  Surgery was consulted on the case, and he underwent Exploratory laparotomy and colostomy takedown and closure on 5/27/2021.  Wound VAC was later placed. Psychiatry continued to follow the patient, his legal hold was discontinued on 6/4/2021.     Interval Problem Update  Colostomy - pain controlled  Afib - rate 70-90  Diarrhea - fecal lactoferrin positive  Suicidal - he was seen earlier today, he denied having any suicidal thoughts, ideation or plan.  He was informed  by myself, the RN and case management that he will be discharging to a skilled nursing facility.  RN later informs me that he stated that he was having suicidal thoughts and ideation with a clear plan.  Low magnesium  Hypothyroid - TSH 5    I have personally seen and examined the patient at bedside. I discussed the plan of care with patient, bedside RN, charge RN and .    Consultants/Specialty  cardiology, general surgery and psychiatry    Code Status  Full Code    Disposition  Patient is not medically cleared.   Anticipate discharge to to skilled nursing facility.  I have placed the appropriate orders for post-discharge needs.    Review of Systems  Review of Systems   Constitutional: Negative for chills, diaphoresis, fever and malaise/fatigue.   HENT: Negative for congestion, hearing loss and sore throat.    Eyes: Negative for blurred vision.   Respiratory: Negative for cough, shortness of breath and wheezing.    Cardiovascular: Negative for chest pain, palpitations and leg swelling.   Gastrointestinal: Positive for abdominal pain and diarrhea. Negative for blood in stool, heartburn, melena, nausea and vomiting.   Genitourinary: Negative for dysuria, flank pain and hematuria.   Musculoskeletal: Negative for back pain, joint pain, myalgias and neck pain.   Skin: Negative for rash.   Neurological: Negative for dizziness, sensory change, speech change, focal weakness, weakness and headaches.   Psychiatric/Behavioral: Positive for depression and suicidal ideas. Negative for hallucinations. The patient is not nervous/anxious.         Physical Exam  Temp:  [36.4 °C (97.5 °F)-36.8 °C (98.3 °F)] 36.8 °C (98.3 °F)  Pulse:  [72-89] 89  Resp:  [17] 17  BP: (110-139)/(72-79) 139/79  SpO2:  [95 %] 95 %    Physical Exam  Vitals and nursing note reviewed.   Constitutional:       Appearance: He is obese.   HENT:      Head: Normocephalic and atraumatic.      Nose: No congestion.      Mouth/Throat:      Mouth: Mucous  membranes are moist.   Eyes:      Conjunctiva/sclera: Conjunctivae normal.   Cardiovascular:      Rate and Rhythm: Normal rate. Rhythm irregularly irregular.   Pulmonary:      Effort: Pulmonary effort is normal.      Breath sounds: Normal breath sounds.   Abdominal:      General: There is no distension.      Tenderness: There is no abdominal tenderness. There is no guarding or rebound.      Comments: Wound VAC in place   Musculoskeletal:      Cervical back: No muscular tenderness.      Right lower leg: No edema.      Left lower leg: No edema.   Skin:     General: Skin is warm and dry.   Neurological:      General: No focal deficit present.      Mental Status: He is alert and oriented to person, place, and time.      Cranial Nerves: No cranial nerve deficit.   Psychiatric:         Mood and Affect: Mood is depressed.         Behavior: Behavior is cooperative.         Thought Content: Thought content is not paranoid or delusional. Thought content includes suicidal ideation. Thought content does not include homicidal ideation. Thought content includes suicidal plan. Thought content does not include homicidal plan.         Fluids    Intake/Output Summary (Last 24 hours) at 7/6/2021 1605  Last data filed at 7/6/2021 1400  Gross per 24 hour   Intake 1080 ml   Output 1450 ml   Net -370 ml       Laboratory  Recent Labs     07/06/21  0820   WBC 9.6   RBC 3.43*   HEMOGLOBIN 9.8*   HEMATOCRIT 31.1*   MCV 90.7   MCH 28.6   MCHC 31.5*   RDW 50.3*   PLATELETCT 266   MPV 10.5     Recent Labs     07/06/21  0820   SODIUM 135   POTASSIUM 5.4   CHLORIDE 103   CO2 25   GLUCOSE 112*   BUN 28*   CREATININE 1.19   CALCIUM 8.8                   Imaging  CT-ABDOMEN-PELVIS W/O   Final Result         1. Liquid stool in the distal colon, consistent with diarrhea.   2. Unchanged perirectal fat stranding, sequela of nonspecific proctitis.   3. Cholelithiasis. No cholecystitis.   4. Decreased blood pool attenuation, suggesting anemia.   5. Chronic  T11 and T12 compression fractures.      US-RENAL   Final Result      1.  Low-level echogenic debris in the urinary bladder is noted which could indicate UTI or blood within the bladder.      2.  No hydronephrosis.      IR-MIDLINE CATHETER INSERTION WO GUIDANCE > AGE 3   Final Result                  Ultrasound-guided midline placement performed by qualified nursing staff    as above.          EC-ECHOCARDIOGRAM COMPLETE W/ CONT   Final Result      DX-CHEST-PORTABLE (1 VIEW)   Final Result      1.  Minor asymmetric interstitial opacity in the right lower lobe which could represent minor interstitial edema, pneumonia, or fibrotic change.      2.  No left lung consolidation.      3.  No evidence of congestive failure.      [id number]-5 VIEW   Final Result         No specific finding to suggest small bowel obstruction.      WL-AZUFCTA-3 VIEW   Final Result         No significant interval change.      DX-CHEST-LIMITED (1 VIEW)   Final Result         Diffuse interstitial prominence could relate to mild pulmonary edema or atypical infection      [id number] VIEW   Final Result      Increased colonic gas without definite bowel obstruction.      US-RENAL   Final Result      No evidence of hydronephrosis.      Lobulated kidneys bilaterally.      CT-ABDOMEN-PELVIS WITH   Final Result      1.  There is no evidence of small bowel obstruction.   2.  There is a left lower quadrant ostomy.   3.  There is fluid distention of the colon distal to the surgical material in the left mid descending colon extending all the way to the rectum. There is no pneumatosis or free air.   4.  There is cholelithiasis without biliary dilatation.   5.  There has been interval removal of the drainage catheter anterior to the spleen with minimal hypodense area in the anterior spleen again noted. There is no new fluid collection.      IR-US GUIDED PIV   Final Result    Ultrasound-guided PERIPHERAL IV INSERTION performed by    qualified nursing staff as  above.      EC-ECHOCARDIOGRAM COMPLETE W/O CONT   Final Result      DX-LUMBAR SPINE-2 OR 3 VIEWS   Final Result      Moderate compression deformity of T11 is new compared to 2018.      Mild wedge deformity of T12 is unchanged.      Degenerative changes including facet arthropathy.      Mild retrolisthesis of L5 on S1 and L3 on L4.              Assessment/Plan  Urinary tract infection- (present on admission)  Assessment & Plan  Completed 10-day course of Bactrim    Gram-negative bacteremia- (present on admission)  Assessment & Plan  Completed 10-day course of Bactrim    Major depressive disorder, recurrent, severe with psychotic features (HCC)- (present on admission)  Assessment & Plan  Geodon, Paxil, Klonopin    Colostomy present (HCC)- (present on admission)  Assessment & Plan  Exploratory laparotomy with colostomy takedown and closure 5/27/2021  Wound care, pain control    Suicidal ideation- (present on admission)  Assessment & Plan  Legal hold discontinued 6/4/2021  Reinitiate legal hold - 7/6/2021  1:1 sitter  Reconsult Psychiatry    Diarrhea- (present on admission)  Assessment & Plan  C diff negative  Fecal lactoferrin positive - check stool culture    Normocytic anemia- (present on admission)  Assessment & Plan  Follow cbc    Leukocytosis- (present on admission)  Assessment & Plan  Follow cbc    Obstructive uropathy- (present on admission)  Assessment & Plan  resolved    Permanent atrial fibrillation (HCC)- (present on admission)  Assessment & Plan  Metoprolol, Eliquis    Chronic heart failure with preserved ejection fraction (HCC)- (present on admission)  Assessment & Plan  Metoprolol  Fluid restriction of 1.5 L per day      Type 2 diabetes mellitus with hyperglycemia, with long-term current use of insulin (HCC)- (present on admission)  Assessment & Plan  Follow bmp    Hypothyroidism- (present on admission)  Assessment & Plan  Increase Levothyroxine to 150 mcg  Check TSH on 8/6/2021    SRUTHI (acute kidney  injury) (HCC)- (present on admission)  Assessment & Plan  Resolved    Adrenal insufficiency (HCC)- (present on admission)  Assessment & Plan  Cortef 5 mg - decrease to daily      Debility- (present on admission)  Assessment & Plan  PT and OT    Orthostatic hypotension- (present on admission)  Assessment & Plan  off Florinef.    Hypomagnesemia- (present on admission)  Assessment & Plan  IV Mg 2 g  Follow level    Hypokalemia- (present on admission)  Assessment & Plan  Stop Kdur  Follow bmp    Chronic venous insufficiency of lower extremity- (present on admission)  Assessment & Plan  compression stockings.    Mixed hyperlipidemia- (present on admission)  Assessment & Plan  Atorvastatin.     Lower limb amputation, great toe (HCC)- (present on admission)  Assessment & Plan  Monitor    Obesity due to excess calories with serious comorbidity- (present on admission)  Assessment & Plan  Body mass index is 50.08 kg/m².       VTE prophylaxis: therapeutic anticoagulation with Eliquis    I have performed a physical exam and reviewed and updated ROS and Plan today (7/6/2021). In review of yesterday's note (7/5/2021), there are no changes except as documented above.

## 2021-07-06 NOTE — CONSULTS
"[facility]    INPATIENT ASSESSMENT    Name: Sebastián Castro  MRN: [id number]  : 1965  Age: 56 y.o.  Date of assessment: 2021  PCP: ELIZABETH Terrazas  Persons in attendance: Patient    Length of Intervention 45 minutes      CHIEF COMPLAINT/PRESENTING ISSUE (as stated by Patient): Sebastián is a 56 year old male who is well known to the Behavioral Health Department. Patient was seen sitting up on hospital bed, alert and oriented, speech slow and soft in tone but logical and coherent. Patient was pleasant and easy to engage. No homicidal ideations, no auditory or visual hallucinations reported. Patient has been in house since February with intermittent and recurrent suicidal ideations and gestures.  Patient reports that he made a suicide statement to the nurse today.  It appears the suicidal gestures are congruent with something the patient wants and is not getting. In this case, patient states that he knew he was scheduled to leave the hospital today to go to SNF for rehab services, \"I just snapped! I  don't know if there is a problem with the surgery, I am having diarrhea and pain from the surgery and nobody is telling me what's wrong\". Patient continued, \"I just told the nurse I was having those thoughts again and took a blanket and rolled it up, telling her I was going to hang myself\". Patient continues, \"I feel like as soon as I go a few steps forward, I go backwards\". Challenged patient to consider that he could be a contributor to going backwards when he makes suicide statements and gestures, now his SNF bed is gone and his transfer has been cancelled.  Discussed with patient ways to get needs met without the use of suicidal statements. Patient states, \"yes they are just thoughts\". Discussed with patient ways of coping with such thoughts and engaging his own power to control both his thoughts and actions.   Patient is now denying suicidal ideations , no plan or intent. Patient states, \"I " "want to go to the nursing home rehab now, do you think its too late?\" Patient continues, \"I use to be able to walk, now my legs are not working right, I can only take a few steps, I need to go to rehab\".  Met with Case Management Assistant who attempted to contact [facility] to see if they would consider taking patient again, had to leave a message.  Summary:    Sebastián is currently denying suicidal ideations, which is not to imply that he will not claim suicidal thoughts again. It appears that patient uses suicidal statements and gestures to gain attention or access to something he wants. Patient is aware of the system and the impact of saying he is suicidal has on any plans for him that he may not be in complete agreement with or have anxiety about like transferring to a new facility. Once the transfer was schedule, patient expressed a desire to go to the facility knowing this was no longer an option. Although it is important to be take any threats seriously and to respond according to hospital protocol, I would recommend contacting psychiatry team immediately so we can attempt to intervene before treatment or discharge plans are changed or interrupted.     At this time the legal hold is discontinued. Patient is able to contract for safety and is future focused on rehab and independence.         Chief Complaint   Patient presents with   • Syncope     X1 this morning when standing up to go to bathroom        CURRENT LIVING SITUATION/SOCIAL SUPPORT: Prior to patient's hospitalization, he was residing in a [facility] group home, fully independent and ambulatory. Patient has a brother who has visited him once during this hospitalization. Appears to have a step son named Freddie noted in past charting, [phone].  Brother who lives in [place].  Previously , worked at [employer] in the 1990s.  Reports his former wife and children have all passed. Lost job 3/2018 and couldn't get new one d/t wound on foot.  Some " income from disability.    BEHAVIORAL HEALTH TREATMENT HISTORY  Does patient/parent report a history of prior behavioral health treatment for patient?   Patient has been on and off legal holds during this hospital stay. No prior inpatient hospitalizations. Was being treated on an outpatient basis by Dr. Dill with [facility].    SAFETY ASSESSMENT - SELF  Does patient acknowledge current or past symptoms of dangerousness to self? yes  Does parent/significant other report patient has current or past symptoms of dangerousness to self? N\A  Does presenting problem suggest symptoms of dangerousness to self? No    SAFETY ASSESSMENT - OTHERS  Does patient acknowledge current or past symptoms of aggressive behavior or risk to others? no  Does parent/significant other report patient has current or past symptoms of aggressive behavior or risk to others?  N\A  Does presenting problem suggest symptoms of dangerousness to others? No    Crisis Safety Plan completed and copy given to patient? no    ABUSE/NEGLECT SCREENING  Does patient report feeling “unsafe” in his/her home, or afraid of anyone?  no  Does patient report any history of physical, sexual, or emotional abuse?  no  Does parent or significant other report any of the above? no  Is there evidence of neglect by self?  no  Is there evidence of neglect by a caregiver? no  Does the patient/parent report any history of CPS/APS/police involvement related to suspected abuse/neglect or domestic violence? no  Based on the information provided during the current assessment, is a mandated report of suspected abuse/neglect being made?  No    SUBSTANCE USE SCREENING  Per medical record, Patient reports a history of alcoholism but has been sober for 2 years      MENTAL STATUS              Participation: Active verbal participation  Grooming: Casual  Orientation: Alert  Behavior: Calm  Eye contact: Good  Mood: Depressed  Affect: Flat  Thought process: Logical  Thought content: Within  normal limits  Speech: Soft  Perception: Within normal limits  Memory:  No gross evidence of memory deficits  Insight: Limited  Judgment:  Limited  Other:    Collateral information:   Source:   Significant other present in person:    Significant other by telephone   [facility]    [facility] Nursing Staff x Consulted   [facility] x reviewed   Other:      Unable to complete full assessment due to:   Acute intoxication   Patient declined to participate/engage   Patient verbally unresponsive   Significant cognitive deficits   Significant perceptual distortions or behavioral disorganization   Other:             CLINICAL IMPRESSIONS:  Primary: Personality disorder NOS  Secondary:  Major Depression, recurrent, severe-without psychotic features                                      IDENTIFIED NEEDS/PLAN:  [Trigger DISPOSITION list for any items marked]      Imminent safety risk - self  Imminent safety risk - others     Acute substance withdrawal   Psychosis/Impaired reality testing     Mood/anxiety   Substance use/Addictive behavior     Maladaptive behavior   Parent/child conflict     Family/Couples conflict   Biomedical     Housing   Financial      Legal  Occupational/Educational     Domestic violence   Other:     Recommendations and Observation Level:  Sitter: No  Phone: yes  Visitors: yes  Personal belongings: yes      Legal Hold: Discontinued    Thank you,    Susan Ewing, Ph.D.  7/6/2021

## 2021-07-06 NOTE — CARE PLAN
The patient is Stable - Low risk of patient condition declining or worsening    Shift Goals  Clinical Goals: prevent skin breakdown, able to use urinal properly  Patient Goals: rest  Family Goals: n/a    Progress made toward(s) clinical / shift goals:  Pt is able to inform staff of when having an incontinent episode. Change and clean pt in timely manner. Q2 turns, barrier cream and wipes, pillows to offload, SCDs.    Patient is not progressing towards the following goals:

## 2021-07-06 NOTE — DISCHARGE PLANNING
Anticipated Discharge Disposition: [facility]    Action: Patient discharging to [facility] today. Per [facility] to provide transport at 3:00 pm. Bedside RN, patient and MD updated.     Barriers to Discharge: none    Plan: Follow up as needed.     Addendum  1230  RN CM informed by bedside RN the patient is being placed on a legal hold for suicidal ideation. Legal hold paperwork completed by bedside RN.

## 2021-07-06 NOTE — CARE PLAN
The patient is Stable - Low risk of patient condition declining or worsening    Shift Goals  Clinical Goals: protect skin from moisture/breakdown      Progress made toward(s) clinical / shift goals:      Patient is not progressing towards the following goals:

## 2021-07-06 NOTE — DISCHARGE INSTRUCTIONS
Discharge Instructions    Discharged to other by medical transportation with escort. Discharged via wheelchair, hospital escort: Yes.  Special equipment needed: Not Applicable    Be sure to schedule a follow-up appointment with your primary care doctor or any specialists as instructed.     Discharge Plan:   Diet Plan: Discussed  Activity Level: Discussed  Confirmed Follow up Appointment: Patient to Call and Schedule Appointment  Confirmed Symptoms Management: Discussed  Medication Reconciliation Updated: Yes  Influenza Vaccine Indication: Not indicated: Previously immunized this influenza season and > 8 years of age    I understand that a diet low in cholesterol, fat, and sodium is recommended for good health. Unless I have been given specific instructions below for another diet, I accept this instruction as my diet prescription.   Other diet: GI soft    Special Instructions: None    · Is patient discharged on Warfarin / Coumadin?   No     Depression / Suicide Risk    As you are discharged from this [facility] facility, it is important to learn how to keep safe from harming yourself.    Recognize the warning signs:  · Abrupt changes in personality, positive or negative- including increase in energy   · Giving away possessions  · Change in eating patterns- significant weight changes-  positive or negative  · Change in sleeping patterns- unable to sleep or sleeping all the time   · Unwillingness or inability to communicate  · Depression  · Unusual sadness, discouragement and loneliness  · Talk of wanting to die  · Neglect of personal appearance   · Rebelliousness- reckless behavior  · Withdrawal from people/activities they love  · Confusion- inability to concentrate     If you or a loved one observes any of these behaviors or has concerns about self-harm, here's what you can do:  · Talk about it- your feelings and reasons for harming yourself  · Remove any means that you might use to hurt yourself (examples: pills,  rope, extension cords, firearm)  · Get professional help from the community (Mental Health, Substance Abuse, psychological counseling)  · Do not be alone:Call your Safe Contact- someone whom you trust who will be there for you.  · Call your local CRISIS HOTLINE 573-9519 or 974-434-8548  · Call your local Children's Mobile Crisis Response Team [place] (989) 238-2380 or www.[employer]  · Call the toll free National Suicide Prevention Hotlines   · National Suicide Prevention Lifeline 429-182-ZSQR (1617)  · [employer] Hope Line Network 800-SUICIDE (511-2562)    Vacuum-Assisted Closure Therapy  Vacuum-assisted closure (VAC) therapy uses a device that removes fluid and germs from wounds to help them heal. It is used on wounds that cannot be closed with stitches. They often heal slowly. Vacuum-assisted therapy helps the wound stay clean and healthy while the open wound slowly grows back together.  Vacuum-assisted closure therapy uses a bandage (dressing) that is made of foam. It is put inside the wound. Then, a drape is placed over the wound. This drape sticks to your skin to keep air out, and to protect the wound. A tube is hooked up to a small pump and is attached to the drape. The pump sucks out the fluid and germs. Vacuum-assisted closure therapy can also help reduce the bad smell that comes from the wound.  HOW DOES IT WORK?   The vacuum pump pulls fluid through the foam dressing. The dressing may wrinkle during this process. The fluid goes into the tube and away from the wound. The fluid then goes into a container. The fluid in the container must be replaced if it is full or at least once a week, even if the container is not full. The pulling from the pump helps to close the wound and bring better circulation to the wound area.  The foam dressing covers and protects the wound. It helps your wound heal faster.   HOW DOES IT FEEL?   · You might feel a little pulling when the pump is on.  · You might also feel  a mild vibrating sensation.    · You might feel some discomfort when the dressing is taken off.  CAN I MOVE AROUND WITH VACUUM-ASSISTED CLOSURE THERAPY?  Yes, it has a backup battery which is used when the machine is not plugged in, as long as the battery is working, you can move freely.  WHAT ARE SOME THINGS I MUST KNOW?  · Do not turn off the pump yourself, unless instructed to do so by your healthcare provider, such as for bathing.  · Do not take off the dressing yourself, unless instructed to do so by your caregiver.  · You can wash or shower with the dressing. However, do not take the pump into the shower. Make sure the wound dressing is protected and covered with plastic. The wound area must stay dry.  · Do not turn off the pump for more than 2 hours. If the pump is off for more than 2 hours, your nurse must change your dressing.  · Check frequently that the machine is on, that the machine indicates the therapy is on, and that all clamps are open.  THE ALARM IS SOUNDING! WHAT SHOULD I DO?   · Stay calm.  · Do not turn off the pump or do anything with the dressing.  · Call your clinic or caregiver right away if the alarm goes off and you cannot fix the problem. Some reasons the alarm might go off include:  ¨ The fluid collection container is full.  ¨ The battery is low.  ¨ The dressing has a leak.  · Explain to your caregiver what is happening. Follow the instructions you receive.  WHEN SHOULD I CALL FOR HELP?   · You have severe pain.  · You have difficulty breathing.  · You have bleeding that will not stop.  · Your wound smells bad.  · You have redness, swelling, or fluid leaking from your wound.  · Your alarm goes off and you do not know what to do.  · You have a fever.  · Your wound itches severely.  · Your dressing changes are often painful or bleeding often occurs.  · You have diarrhea.  · You have a sore throat.  · You have a rash around the dressing or anywhere else on your body.  · You feel  nauseous.  · You feel dizzy or weak.  · The VAC machine has been off for more than 2 hours.  HOW DO I GET READY TO GO HOME WITH A PUMP?   A trained caregiver will talk to you and answer your questions about your vacuum-assisted closure therapy before you go home. He or she will explain what to expect. A caregiver will come to your home to apply the pump and care for your wound.  The at-home caregiver will be available for questions and will come back for the scheduled dressing changes, usually every 48-72 hours (or more often for severely infected wounds). Your at-home caregiver will also come if you are having an unexpected problem. If you have questions or do not know what to do when you go home, talk to your healthcare provider.  This information is not intended to replace advice given to you by your health care provider. Make sure you discuss any questions you have with your health care provider.  Document Released: 11/30/2009 Document Revised: 08/20/2014 Document Reviewed: 09/22/2016  Elsevier Interactive Patient Education © 2017 [employer]

## 2021-07-06 NOTE — THERAPY
"Occupational Therapy  Daily Treatment     Patient Name: Sebastián Castro  Age:  56 y.o., Sex:  male  Medical Record #: [id number]  Today's Date: 7/6/2021       Precautions: Fall Risk  Comments: wound vac to abdomen and BMS    Assessment    Pt seen for OT tx.  Pt was pleasant & co-operative & willing to participate.  Pt does well with encouragement.  Pt tends to be self limiting & does not engage in ADL's unless prompted & encouraged by others.  Pt encouraged to take an active role in his care & be OOB for meals.      Plan    Continue current treatment plan.    DC Equipment Recommendations: Unable to determine at this time  Discharge Recommendations: Recommend post-acute placement for additional occupational therapy services prior to discharge home    Subjective    \"I'm not feeling like I want to hurt myself anymore\"     Objective       07/06/21 1538   Cognition    Comments Pt was co-operative & willing to participate   Sitting Upper Body Exercises   Comments Pt encouraged to work on postural muscles in sitting to improve thoracic & cervical ext    Neuro-Muscular Treatments   Comments Pt tends to stand with kyphotic posture.  Pt encouraged to improve posture in standing   Balance   Sitting Balance (Static) Fair +   Sitting Balance (Dynamic) Fair   Standing Balance (Static) Fair   Standing Balance (Dynamic) Fair -   Weight Shift Sitting Fair   Weight Shift Standing Fair   Bed Mobility    Supine to Sit Moderate Assist   Sit to Supine Moderate Assist   Scooting Moderate Assist   Rolling Moderate Assist to Rt.   Activities of Daily Living   Eating Modified Independent   Grooming Supervision;Seated   Upper Body Dressing Minimal Assist   Lower Body Dressing Maximal Assist   Toileting Maximal Assist   Functional Mobility   Sit to Stand Moderate Assist   Mobility pt able to work on sit-stands multiple times as well as amb around his bed with 4WW & Min A   Patient / Family Goals   Patient / Family Goal #1 To feel better   Goal #1 " Outcome Progressing as expected   Short Term Goals   Short Term Goal # 1 Pt will perform LB dressing w/ min A   Goal Outcome # 1 Progressing slower than expected   Short Term Goal # 2 Pt will perform functional t/f's with supervision   Goal Outcome # 2 Progressing slower than expected   Short Term Goal # 3 Pt will perform h/g standing sink side with supervision   Goal Outcome # 3 Progressing slower than expected

## 2021-07-06 NOTE — PROGRESS NOTES
Bedside report received. Assessment completed.  Pt is A&O x4. Pt on room air.   Medicating for pain PRN per MAR Pain 0/10  Denies nausea.  BLE  Numbness/tingling.  Skin redness/excoriation to pannus and buttocks, MLI w/ vac   LDA 20LUA   Last BM 7/6/21 . +flatus,   +void.  GI soft diet. Tolerates well.   Pt max assist.  Call light and belongings within reach. All needs met at this time. Fall Precautions and hourly rounding in place.

## 2021-07-06 NOTE — CONSULTS
"PSYCHOLOGICAL FOLLOW-UP:  Reason for admission: Syncope and collapse [R55]  Suicidal ideation [R45.851]  Sepsis due to Klebsiella pneumoniae (HCC) [A41.4]  Length of Visit: 16min    Legal status: Legal Status: Involuntary    Chief Complaint: \"I prayed a lot.\"     HPI: Met with the patient to assess risk level and provide crisis intervention. Patient presented with a euthygmic affect and reported a \"good\" mood. He denied current and active suicidal thoughts, plans, and intent. However, again, he could not explain what had changed from the previous week other than \"I prayed a lot.\" This is something he has stated in the past as a reason he was no longer suicidal. While praying has been a coping skill he has used in the past, praying alone has not been effective in mitigating his acute suicidality. Attempted again to identify other ways the patient could cope with his chronic suicidality. He continued to find it difficult to identify coping skills including the ones discussed in previous psychotherapy sessions. Informed the patient that he was still considered at risk of attempting suicide given the recent history of vacillation with his SI. Discussed testing out access to his call light. Agreed to meet again for reassessment on Sunday or Monday.     Psychiatric Examination:  Vitals: Blood pressure 134/63, pulse 81, temperature 36.4 °C (97.6 °F), temperature source Temporal, resp. rate 20, height 1.829 m (6'), weight 116 kg (256 lb 13.4 oz), SpO2 96 %.  Musculoskeletal: normal psychomotor activity, no tics or unusual mannerisms noted  Appearance and Eye Contact: appropriate dress and grooming. Behavior is calm, cooperative,  appropriate eye contact  Attention/Alertness: Alert  Thought Process: Linear, Logical and Goal Directed    Thought Content: No psychotic processes noted  Speech: Clear with normal rate and rhythm  Mood: \"good\"            Affect: euthymic         SI/HI: Denies    Memory: Recent and remote memory " appear intact    Orientation: alert, oriented to person, place and time  Insight into symptoms: very poor  Judgement into symptoms:very poor    ASSESSMENT: While the patient continues to deny current and active suicidality, he still cannot explain how he will keep himself from attempting suicide the next time he becomes acutely suicidal (which could be at any point in the day given his history.) It has only been a week since he was taken off a legal hold for denial of suicidality and then placed back on one two days later as he was acutely suicidal and intent on ending his life. His mood and suicidality continue to be based off how he feels in the moment without insight into how he can cope to get through that moment. Given the above, he remains at high risk of attempting suicide.     DSM5 Diagnostic Considerations:   Unspecified Personality Disorder   -avoidant? Borderline?  Major Depressive Disorder, recurrent, severe, with psychotic features    PLAN:  Legal status: Involuntary. Legal hold remains extended.    Tele-sitter with q15 checks. Upgrade to 1:1 sitter if he engages in any self-harming or suicidal behavior or gesture. Will reevaluate tomorrow.     Limit conversations with the patient to only as needed interactions as his suicidality can worsen with perceived negative interactions with staff.      Patient is allowed to have access to:     [employer]     TV with his call light. Please immediately remove if he appears to be engaging in any self-harm/suicidal behavior.     Legal hold restrictions as follows:    -Strip room of anything he can hurt himself with including all cords (except call light.) Do not leave anything in there that he could use to stick inside his body.     -Finger foods only     -Supervise ostomy bag changes and remove all supplies once he is done     -Place gloves on the patient or consider restraints if he attempts to pull the ostomy out and/or hurts himself in anyway      -Do not allow  the patient to walk around the unit as he could grab something to hurt himself with while on that walk.     -please continue to refer patient to inpatient psychiatric hospitals     Anticipate F/U within 72 hours.   Records reviewed: yes  Will continue to follow  Thank you for the consult.    Madhuri Fernandez, Ph.D.   No

## 2021-07-06 NOTE — DISCHARGE PLANNING
Agency/Facility Name: [facility]  Outcome: Left vmail for admission regarding trans and bed avail     -1235  Agency/Facility Name: [facility]  Spoke To: Sathish  Outcome: DPA informed Sathish this Pt is back on a legal hold.  Sathish will cancel this transport and follow this Pt

## 2021-07-06 NOTE — PROGRESS NOTES
"ZULY Gusman informed me that the patient verbalized thoughts of suicide with specific plan on hanging himself.    I went into the room to talk to the patient. He confirmed that he has had these thoughts since \"last night\". He stated he is \"tired of everything\" and \"wants to kill himself\".     Dr. Lazo, Charge RN,  notified.     Legal hold initiated. Telesitter ordered.    CNA went back to patients room and he was rolling up the sheet and blanket. Linens, call light, telephone removed.  "

## 2021-07-06 NOTE — DISCHARGE SUMMARY
Discharge Summary    CHIEF COMPLAINT ON ADMISSION  Chief Complaint   Patient presents with   • Syncope     X1 this morning when standing up to go to bathroom       Reason for Admission  ems     CODE STATUS  Full Code    HPI & HOSPITAL COURSE  This is a 56 y.o. male here with  syncope, he was noted to be bradycardic and hypotensive.  He has known history of permanent atrial fibrillation and he was on diltiazem, digoxin and metoprolol.  These medications were held.  Later on he was restarted on low-dose Toprol and Digoxin.  He was also started on anticoagulation with Eliquis.  He also had a possible history of adrenal insufficiency, he was already on Florinef and midodrine.  Midodrine was discontinued and he was started on cortisol. Further work-up showed that he had gram-negative bacteremia as well as urinary tract infection and acute kidney injury.  He was initially placed on IV Zosyn, later changed to oral Bactrim to finish the course.  He was carefully hydrated with IV fluids.  On admission he also expressed suicidal ideation.  Psychiatry was consulted the case, he was placed on a legal hold.  He was noted to have severe depression.  He has been placed on medication the form of Geodon, Paxil, Klonopin.  He has a colostomy in place, which the patient believes contributes to his severe depression because of ostomy care.  He had history of bowel perforation and splenic fluid collection requiring Segmental colectomy, colostomy creation, mobilization of splenic flexure, wound VAC placement, irrigation and debridement of flank wound on 11/10/2020.  Surgery was consulted on the case, and he underwent exploratory laparotomy and colostomy takedown and closure on 5/27/2021.  Wound VAC was later placed.  Psychiatry continued to follow the patient, his legal hold was discontinued on 6/4/2021.  He will need continued rehabitation in a skilled nursing facility, with physical therapy, Occupational Therapy, and wound care, he  still has a wound VAC in place.  Surgery has cleared him for discharge.  His hypothyroid medication the thyroxine, was increased to 150 mcg/day, he will need a repeat or recheck TSH in 4 weeks.  On 7/6/2021, with patient was informed that he was being transferred to a skilled nursing facility, started having anxiety and suicidal thoughts.  He was placed on a legal hold, psychiatry was immediately consulted, who promptly saw him and discontinued his legal hold.  Psychiatry has cleared him, he will be transferred to a skilled nursing facility.      Therefore, he is discharged in good and stable condition to skilled nursing facility.    The patient met 2-midnight criteria for an inpatient stay at the time of discharge.      FOLLOW UP ITEMS POST DISCHARGE  Follow-up with surgery    DISCHARGE DIAGNOSES  Active Problems:    Suicidal ideation POA: Yes    Colostomy present ([facility]) POA: Yes    Major depressive disorder, recurrent, severe with psychotic features ([facility]) POA: Yes    Gram-negative bacteremia POA: Yes    Urinary tract infection POA: Yes    Hypokalemia POA: Yes    Hypomagnesemia POA: Yes    Orthostatic hypotension POA: Yes    Debility POA: Yes    Adrenal insufficiency ([facility]) POA: Yes      Overview: Cortisol 17 in stress state      Hydrocortisone started    SRUTHI (acute kidney injury) ([facility]) POA: Yes    Hypothyroidism POA: Yes    Type 2 diabetes mellitus with hyperglycemia, with long-term current use of insulin ([facility]) POA: Yes    Chronic heart failure with preserved ejection fraction ([facility]) POA: Yes    Permanent atrial fibrillation ([facility]) POA: Yes    Obstructive uropathy POA: Yes    Leukocytosis POA: Yes    Normocytic anemia POA: Yes    Diarrhea POA: Yes    Obesity due to excess calories with serious comorbidity POA: Yes    Lower limb amputation, great toe ([facility]) POA: Yes    Mixed hyperlipidemia POA: Yes    Chronic venous insufficiency of lower extremity POA: Yes  Resolved Problems:    Syncope POA: Yes    Hyponatremia POA:  Yes    Chest pain POA: Yes    Essential hypertension POA: Yes    Nonischemic cardiomyopathy (HCC) POA: Yes    Chronic systolic heart failure (HCC) POA: Yes    Sepsis with acute renal failure without septic shock (HCC) POA: No    Elevated troponin POA: Yes    Syncope due to orthostatic hypotension POA: Yes    Normal anion gap metabolic acidosis POA: No    Abdominal bloating POA: No    Cardiogenic pulmonary edema (HCC) POA: No    Rash POA: Yes      FOLLOW UP  No future appointments.  ELIZABETH Terrazas  [address] [id number] [phone]    Call  Please call your Primary Care Office to schedule a follow up appointment. Thank You.    [facility]  [address] [facility] [address]  [phone]          MEDICATIONS ON DISCHARGE     Medication List      START taking these medications      Instructions   amiodarone 200 MG Tabs  Commonly known as: Cordarone   Take 1 tablet by mouth every day.  Dose: 200 mg     apixaban 5mg Tabs  Commonly known as: ELIQUIS   Take 1 tablet by mouth 2 times a day. Indications: Thromboembolism secondary to Atrial Fibrillation  Dose: 5 mg     clonazePAM 0.5 MG Tabs  Commonly known as: KLONOPIN   0.5 mg in the morning, 1 mg at night     Fiber 625 MG Tabs   Take 1 tablet by mouth 2 times a day with meals.  Dose: 625 mg     hydrocortisone 5 MG Tabs  Commonly known as: CORTEF   Take 1 tablet by mouth every day.  Dose: 5 mg     magnesium oxide 400 MG Tabs tablet  Commonly known as: MAG-OX   Take 1 tablet by mouth 2 times a day.  Dose: 400 mg     omeprazole 40 MG delayed-release capsule  Commonly known as: PRILOSEC   Take 1 capsule by mouth every 12 hours.  Dose: 40 mg     oxyCODONE immediate release 10 MG immediate release tablet  Commonly known as: ROXICODONE   Take 1 tablet by mouth every 6 hours as needed for Severe Pain for up to 3 days.  Dose: 10 mg     PARoxetine 20 MG Tabs  Commonly known as: PAXIL   Take 1 tablet by mouth every day.  Dose: 20  mg     simethicone 80 MG Chew  Commonly known as: MYLICON   Chew 1 tablet 3 times a day as needed (Gas pains).  Dose: 80 mg     vitamin D 1000 UNIT Tabs  Commonly known as: VITAMIND D3   Take 1 tablet by mouth every day.  Dose: 1,000 Units     ziprasidone 60 MG Caps  Commonly known as: GEODON   Take 1 capsule by mouth 2 times a day.  Dose: 60 mg        CHANGE how you take these medications      Instructions   digoxin 125 MCG Tabs  What changed: when to take this  Commonly known as: LANOXIN   Take 1 tablet by mouth every day at 6 PM.  Dose: 125 mcg     levothyroxine 150 MCG Tabs  What changed:   · medication strength  · how much to take  Commonly known as: SYNTHROID   Take 1 tablet by mouth every morning on an empty stomach.  Dose: 150 mcg        CONTINUE taking these medications      Instructions   metoprolol SR 25 MG Tb24  Commonly known as: TOPROL XL   Take 25 mg by mouth every day.  Dose: 25 mg     tamsulosin 0.4 MG capsule  Commonly known as: FLOMAX   Take 1 capsule by mouth 1/2 hour after breakfast.  Dose: 0.4 mg        STOP taking these medications    atorvastatin 40 MG Tabs  Commonly known as: LIPITOR     DILTIAZem  MG Cp24  Commonly known as: CARDIZEM CD     fludrocortisone 0.1 MG Tabs  Commonly known as: FLORINEF     furosemide 20 MG Tabs  Commonly known as: LASIX     insulin glargine 100 UNIT/ML Sopn injection  Generic drug: insulin glargine     lisinopril 10 MG Tabs  Commonly known as: PRINIVIL     loratadine 10 MG Tabs  Commonly known as: CLARITIN     modafinil 100 MG Tabs  Commonly known as: PROVIGIL     risperiDONE 2 MG Tabs  Commonly known as: RISPERDAL        ASK your doctor about these medications      Instructions   atorvastatin 40 MG Tabs  Commonly known as: LIPITOR  Ask about: Should I take this medication?   Take 1 Tablet by mouth every evening for 30 days.  Dose: 40 mg            Allergies  Allergies   Allergen Reactions   • Zosyn [Piperacillin Sod-Tazobactam So] Rash and Itching      Redness/flushed throughout body.    • Daptomycin Rash     Body rash  RXN=1/2018     • Pcn [Penicillins] Hives and Rash      Rash & hives  RXN=since a kid  Tolerates cephalosporins   • Unasyn [Ampicillin-Sulbactam Sodium] Hives, Itching and Swelling   • Vancomycin Rash     Red man syndrome. Tolerates IV vancomycin at reduced infusion rate.       DIET  Orders Placed This Encounter   Procedures   • Diet Order Diet: Low Fiber(GI Soft); Fluid modifications: (optional): 1500 ml Fluid Restriction; Tray Modifications (optional): No Sharps (Paperware)     Begin Low Fiber (GI Soft) diet as soon as alert on floor     Standing Status:   Standing     Number of Occurrences:   1     Order Specific Question:   Diet:     Answer:   Low Fiber(GI Soft) [2]     Order Specific Question:   Fluid modifications: (optional)     Answer:   1500 ml Fluid Restriction [9]     Order Specific Question:   Tray Modifications (optional)     Answer:   No Sharps (Paperware)       ACTIVITY  As tolerated and directed by skilled nursing.  Weight bearing as tolerated    LINES, DRAINS, AND WOUNDS  This is an automated list. Peripheral IVs will be removed prior to discharge.  Peripheral IV 06/24/21 20 G Left Upper arm (Active)   Site Assessment Clean;Intact;Dry 07/06/21 0730   Dressing Type Transparent;Tape 07/06/21 0730   Line Status Saline locked 07/06/21 0730   Dressing Status Clean;Dry;Intact 07/06/21 0730   Dressing Intervention N/A 07/06/21 0730   Dressing Change Due 07/10/21 07/06/21 0730   Date Primary Tubing Changed 07/03/21 07/03/21 1915   Date Secondary Tubing Changed 06/26/21 06/26/21 0800   NEXT Primary Tubing Change  07/04/21 07/03/21 1915   Infiltration Grading ([facility], [facility] [place]) 0 07/06/21 0730   Phlebitis Scale ([facility] Only) 0 07/06/21 0730       Moisture Associated Skin Damage 02/09/21 Anterior (Active)   Wound Image   06/27/21 1100   NEXT Weekly Photo (Inpatient Only) 07/07/21 07/03/21 1915   Drainage Amount None 07/06/21 0730   Periwound  Assessment Pink;Red;Excoriated 07/06/21 0730   IAD Cleansing Soap and Water 07/06/21 0730   Periwound Protectant Barrier Paste 07/05/21 2005   IAD Containment Device None 07/06/21 0730       Wound 05/27/21 Full Thickness Wound Abdomen (Active)   Wound Image   07/05/21 1600   Site Assessment VON 07/06/21 0730   Periwound Assessment VON 07/06/21 0730   Margins VON 07/06/21 0730   Closure VON 07/06/21 0730   Drainage Amount VON 07/06/21 0730   Drainage Description VON 07/06/21 0730   Treatments Cleansed;Site care 07/05/21 1600   Wound Cleansing Approved Wound Cleanser 07/05/21 1600   Periwound Protectant Skin Protectant Wipes to Periwound;Drape 07/05/21 1600   Dressing Cleansing/Solutions Not Applicable 07/05/21 1600   Dressing Options Wound Vac 07/06/21 0730   Dressing Changed Observed 07/06/21 0730   Dressing Status Clean;Dry;Intact 07/06/21 0730   Dressing Change/Treatment Frequency Monday, Wednesday, Friday, and As Needed 07/06/21 0730   NEXT Dressing Change/Treatment Date 07/07/21 07/05/21 2005   NEXT Weekly Photo (Inpatient Only) 07/12/21 07/05/21 1600   Non-staged Wound Description Full thickness 07/05/21 1600   Wound Length (cm) 11.8 cm 07/05/21 1600   Wound Width (cm) 4.6 cm 07/05/21 1600   Wound Depth (cm) 1 cm 07/05/21 1600   Wound Surface Area (cm^2) 54.28 cm^2 07/05/21 1600   Wound Volume (cm^3) 54.28 cm^3 07/05/21 1600   Wound Healing % 34 07/05/21 1600   Tunneling (cm) 0 cm 06/30/21 1200   Tunneling Clock Position of Wound 12 06/07/21 1550   Tunneling - 2nd Location (cm) 0 cm 06/30/21 1200   Tunneling Clock Position of Wound - 2nd Location 6 06/07/21 1550   Undermining (cm) 0 cm 06/30/21 1200   Undermining of Wound, 1st Location From 1 o'clock;To 2 o'clock 06/25/21 1330   Shape linear irregular 07/05/21 1600   Wound Odor None 07/05/21 1600   Pulses N/A 07/02/21 1000   Exposed Structures Fascia 07/05/21 1600   WOUND NURSE ONLY - Time Spent with Patient (mins) 45 07/05/21 1600       Peripheral IV 06/24/21  20 G Left Upper arm (Active)   Site Assessment Clean;Intact;Dry 07/06/21 0730   Dressing Type Transparent;Tape 07/06/21 0730   Line Status Saline locked 07/06/21 0730   Dressing Status Clean;Dry;Intact 07/06/21 0730   Dressing Intervention N/A 07/06/21 0730   Dressing Change Due 07/10/21 07/06/21 0730   Date Primary Tubing Changed 07/03/21 07/03/21 1915   Date Secondary Tubing Changed 06/26/21 06/26/21 0800   NEXT Primary Tubing Change  07/04/21 07/03/21 1915   Infiltration Grading ([facility] [place] 0 07/06/21 0730   Phlebitis Scale ([facility] Only) 0 07/06/21 0730               MENTAL STATUS ON TRANSFER  Level of Consciousness: Alert  Orientation : Oriented x 4  Speech: Speech Clear    CONSULTATIONS  Surgery - [facility]  Psychiatry/psychology  Cardiology    PROCEDURES  Exploratory laparotomy with colostomy takedown and closure 5/27/2021    LABORATORY  Lab Results   Component Value Date    SODIUM 139 07/09/2021    POTASSIUM 3.9 07/09/2021    CHLORIDE 102 07/09/2021    CO2 28 07/09/2021    GLUCOSE 128 (H) 07/09/2021    BUN 16 07/09/2021    CREATININE 1.07 07/09/2021        Lab Results   Component Value Date    WBC 10.2 07/08/2021    HEMOGLOBIN 11.1 (L) 07/08/2021    HEMATOCRIT 35.0 (L) 07/08/2021    PLATELETCT 375 07/08/2021        Total time of the discharge process exceeds 40 minutes.

## 2021-07-07 ENCOUNTER — APPOINTMENT (OUTPATIENT)
Dept: RADIOLOGY | Facility: MEDICAL CENTER | Age: 56
DRG: 981 | End: 2021-07-07
Attending: FAMILY MEDICINE
Payer: MEDICAID

## 2021-07-07 LAB
ANION GAP SERPL CALC-SCNC: 7 MMOL/L (ref 7–16)
BUN SERPL-MCNC: 26 MG/DL (ref 8–22)
CALCIUM SERPL-MCNC: 9 MG/DL (ref 8.5–10.5)
CHLORIDE SERPL-SCNC: 103 MMOL/L (ref 96–112)
CO2 SERPL-SCNC: 26 MMOL/L (ref 20–33)
CREAT SERPL-MCNC: 1.22 MG/DL (ref 0.5–1.4)
GLUCOSE SERPL-MCNC: 124 MG/DL (ref 65–99)
MAGNESIUM SERPL-MCNC: 1.6 MG/DL (ref 1.5–2.5)
NT-PROBNP SERPL IA-MCNC: 3756 PG/ML (ref 0–125)
PHOSPHATE SERPL-MCNC: 3.6 MG/DL (ref 2.5–4.5)
POTASSIUM SERPL-SCNC: 4.4 MMOL/L (ref 3.6–5.5)
SODIUM SERPL-SCNC: 136 MMOL/L (ref 135–145)

## 2021-07-07 PROCEDURE — 83735 ASSAY OF MAGNESIUM: CPT

## 2021-07-07 PROCEDURE — 99232 SBSQ HOSP IP/OBS MODERATE 35: CPT | Performed by: FAMILY MEDICINE

## 2021-07-07 PROCEDURE — 700102 HCHG RX REV CODE 250 W/ 637 OVERRIDE(OP): Performed by: INTERNAL MEDICINE

## 2021-07-07 PROCEDURE — A9270 NON-COVERED ITEM OR SERVICE: HCPCS | Performed by: FAMILY MEDICINE

## 2021-07-07 PROCEDURE — A9270 NON-COVERED ITEM OR SERVICE: HCPCS | Performed by: INTERNAL MEDICINE

## 2021-07-07 PROCEDURE — 83880 ASSAY OF NATRIURETIC PEPTIDE: CPT

## 2021-07-07 PROCEDURE — 87899 AGENT NOS ASSAY W/OPTIC: CPT

## 2021-07-07 PROCEDURE — 36415 COLL VENOUS BLD VENIPUNCTURE: CPT

## 2021-07-07 PROCEDURE — 80048 BASIC METABOLIC PNL TOTAL CA: CPT

## 2021-07-07 PROCEDURE — 97608 NEG PRS WND THER NDME>50SQCM: CPT

## 2021-07-07 PROCEDURE — 306466 ARJO XLARGE SLING FOR UP TO 500LBS: Performed by: FAMILY MEDICINE

## 2021-07-07 PROCEDURE — 700102 HCHG RX REV CODE 250 W/ 637 OVERRIDE(OP): Performed by: FAMILY MEDICINE

## 2021-07-07 PROCEDURE — 700102 HCHG RX REV CODE 250 W/ 637 OVERRIDE(OP): Performed by: STUDENT IN AN ORGANIZED HEALTH CARE EDUCATION/TRAINING PROGRAM

## 2021-07-07 PROCEDURE — 70450 CT HEAD/BRAIN W/O DYE: CPT

## 2021-07-07 PROCEDURE — 700111 HCHG RX REV CODE 636 W/ 250 OVERRIDE (IP): Performed by: FAMILY MEDICINE

## 2021-07-07 PROCEDURE — A9270 NON-COVERED ITEM OR SERVICE: HCPCS | Performed by: STUDENT IN AN ORGANIZED HEALTH CARE EDUCATION/TRAINING PROGRAM

## 2021-07-07 PROCEDURE — 770001 HCHG ROOM/CARE - MED/SURG/GYN PRIV*

## 2021-07-07 PROCEDURE — 87045 FECES CULTURE AEROBIC BACT: CPT

## 2021-07-07 PROCEDURE — 302098 PASTE RING (FLAT): Performed by: FAMILY MEDICINE

## 2021-07-07 PROCEDURE — 84100 ASSAY OF PHOSPHORUS: CPT

## 2021-07-07 RX ORDER — MAGNESIUM SULFATE HEPTAHYDRATE 40 MG/ML
2 INJECTION, SOLUTION INTRAVENOUS ONCE
Status: COMPLETED | OUTPATIENT
Start: 2021-07-07 | End: 2021-07-07

## 2021-07-07 RX ADMIN — CLONAZEPAM 1 MG: 1 TABLET ORAL at 18:26

## 2021-07-07 RX ADMIN — METOPROLOL TARTRATE 25 MG: 25 TABLET, FILM COATED ORAL at 18:26

## 2021-07-07 RX ADMIN — MAGNESIUM SULFATE 2 G: 2 INJECTION INTRAVENOUS at 12:16

## 2021-07-07 RX ADMIN — HYDROCORTISONE 5 MG: 5 TABLET ORAL at 05:14

## 2021-07-07 RX ADMIN — APIXABAN 5 MG: 5 TABLET, FILM COATED ORAL at 05:14

## 2021-07-07 RX ADMIN — ATORVASTATIN CALCIUM 40 MG: 40 TABLET, FILM COATED ORAL at 18:26

## 2021-07-07 RX ADMIN — PAROXETINE HYDROCHLORIDE 20 MG: 20 TABLET, FILM COATED ORAL at 07:53

## 2021-07-07 RX ADMIN — CALCIUM POLYCARBOPHIL 625 MG: 625 TABLET, FILM COATED ORAL at 07:53

## 2021-07-07 RX ADMIN — ZIPRASIDONE HYDROCHLORIDE 60 MG: 60 CAPSULE ORAL at 07:53

## 2021-07-07 RX ADMIN — METOPROLOL TARTRATE 25 MG: 25 TABLET, FILM COATED ORAL at 05:14

## 2021-07-07 RX ADMIN — LEVOTHYROXINE SODIUM 150 MCG: 0.03 TABLET ORAL at 05:14

## 2021-07-07 RX ADMIN — ZIPRASIDONE HYDROCHLORIDE 60 MG: 60 CAPSULE ORAL at 19:59

## 2021-07-07 RX ADMIN — AMIODARONE HYDROCHLORIDE 200 MG: 200 TABLET ORAL at 05:14

## 2021-07-07 RX ADMIN — CALCIUM POLYCARBOPHIL 625 MG: 625 TABLET, FILM COATED ORAL at 18:26

## 2021-07-07 RX ADMIN — TAMSULOSIN HYDROCHLORIDE 0.4 MG: 0.4 CAPSULE ORAL at 07:53

## 2021-07-07 RX ADMIN — Medication 1000 UNITS: at 05:14

## 2021-07-07 RX ADMIN — APIXABAN 5 MG: 5 TABLET, FILM COATED ORAL at 18:26

## 2021-07-07 RX ADMIN — OMEPRAZOLE 40 MG: 20 CAPSULE, DELAYED RELEASE ORAL at 18:26

## 2021-07-07 RX ADMIN — OMEPRAZOLE 40 MG: 20 CAPSULE, DELAYED RELEASE ORAL at 05:14

## 2021-07-07 RX ADMIN — CLONAZEPAM 0.5 MG: 0.5 TABLET ORAL at 07:52

## 2021-07-07 ASSESSMENT — ENCOUNTER SYMPTOMS
DIZZINESS: 0
HEARTBURN: 0
HEADACHES: 0
DEPRESSION: 1
FLANK PAIN: 0
SENSORY CHANGE: 0
MYALGIAS: 0
COUGH: 0
NAUSEA: 0
VOMITING: 0
PALPITATIONS: 0
CHILLS: 0
NERVOUS/ANXIOUS: 0
NECK PAIN: 0
FEVER: 0
WEAKNESS: 0
FOCAL WEAKNESS: 0
SPEECH CHANGE: 0
BACK PAIN: 0
ABDOMINAL PAIN: 1
WHEEZING: 0
BLOOD IN STOOL: 0
HALLUCINATIONS: 0
DIARRHEA: 1
DIAPHORESIS: 0
SORE THROAT: 0
SHORTNESS OF BREATH: 0
BLURRED VISION: 0

## 2021-07-07 NOTE — WOUND TEAM
[facility]  Inpatient Services  Wound and Skin Care Progress Note    Admission Date: 2/9/2021     Last order of IP CONSULT TO WOUND CARE was found on 3/18/2021 from Hospital Encounter on 2/9/2021     HPI, PMH, SH: Reviewed    Past Surgical History:   Procedure Laterality Date   • PB EXPLORATORY OF ABDOMEN  5/27/2021    Procedure: LAPAROTOMY, EXPLORATORY;  Surgeon: Kin Desouza D.O.;  Location: [facility];  Service: General   • COLOSTOMY TAKEDOWN  5/27/2021    Procedure: COLOSTOMY TAKEDOWN AND CLOSURE;  Surgeon: Kin Desouza D.O.;  Location: SUR[facility];  Service: General   • COLECTOMY N/A 11/10/2020    Procedure: COLECTOMY-SEGMENTAL, COLOSTOMY, MOBILIZATION OF SPLENIC FLEXURE, WOUND VAC PLACEMENT, IRRIGATION AND DEBRIDMENT FLANK WOUND;  Surgeon: Kin Desouza D.O.;  Location: [facility];  Service: General   • ZZZ CARDIAC CATH  07/21/2018    EF 45%, normal coronaries.   • IRRIGATION & DEBRIDEMENT ORTHO Right 2/19/2018    Procedure: IRRIGATION & DEBRIDEMENT ORTHO-FOOT;  Surgeon: Kirby Lopez M.D.;  Location: [facility];  Service: Orthopedics   • TOE AMPUTATION Right 1/17/2018    Procedure: TOE AMPUTATION-1ST RAY;  Surgeon: Doug Delong M.D.;  Location: [facility];  Service: Orthopedics   • TOE AMPUTATION Right 11/10/2017    Procedure: TOE AMPUTATION;  Surgeon: Osorio Leo M.D.;  Location: [facility];  Service: Orthopedics     Social History     Tobacco Use   • Smoking status: Never Smoker   • Smokeless tobacco: Never Used   Substance Use Topics   • Alcohol use: No     Chief Complaint   Patient presents with   • Syncope     X1 this morning when standing up to go to bathroom     Diagnosis: Syncope and collapse [R55]  Suicidal ideation [R45.851]  Sepsis due to Klebsiella pneumoniae (HCC) [A41.4]    Unit where seen by Wound Team: T418/00     WOUND CONSULT/FOLLOW UP RELATED TO: abdominal wound    WOUND HISTORY:  Pt is an older  gentleman well known to [facility] wound team 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 Pt currently admitted since 2/9/21 related to SI concerns due to body image issues related to colostomy.      *Pt underwent surgical reversal of colostomy with Dr. Desouza on 5/20/21. MLI was re-opened at that time and was then closed with interrupted staples and packing. Unfortunately MLI continued to Dehisce and therefore wound team was consulted to further manage MLI with dakins packing.     6/3 Wound vac placed    WOUND ASSESSMENT/LDA       Negative Pressure Wound Therapy 06/03/21 Abdomen (Active)   Vacuum Serial Number [id number]    NPWT Pump Mode / Pressure Setting Continuous;125 mmHg    Dressing Type Medium;Black Foam (Regular)    Number of Foam Pieces Used 2    Canister Changed No    Output (mL) 25 mL    NEXT Dressing Change/Treatment Date 07/09/21    VAC VeraFlo Pressure (mm/Hg) Continuous;125 mmHg    WOUND NURSE ONLY - Time Spent with Patient (mins) 45            Wound 05/27/21 Full Thickness Wound Abdomen (Active)   Wound Image     07/07/21 0900   Site Assessment Clean;Red;Granulation tissue    Periwound Assessment Intact;Clean;Dry;Scar tissue    Margins Attached edges;Defined edges    Closure Secondary intention;Adhesive bandage    Drainage Amount Scant    Drainage Description Serosanguineous    Treatments Cleansed;Site care    Wound Cleansing Approved Wound Cleanser    Periwound Protectant Skin Protectant Wipes to Periwound;Drape    Dressing Cleansing/Solutions Not Applicable    Dressing Options Wound Vac    Dressing Changed Changed    Dressing Status Clean;Dry;Intact    Dressing Change/Treatment Frequency Monday, Wednesday, Friday, and As Needed    NEXT Dressing Change/Treatment Date 07/09/21    NEXT Weekly Photo (Inpatient Only) 07/14/21    Non-staged Wound Description Full thickness    Wound Length (cm) 12.4 cm    Wound Width (cm) 4 cm    Wound Depth (cm) 1.4 cm    Wound Surface Area (cm^2) 49.6 cm^2     Wound Volume (cm^3) 69.44 cm^3    Wound Healing % 15    Shape linear irregular    Wound Odor None    Pulses N/A    Exposed Structures Fascia    WOUND NURSE ONLY - Time Spent with Patient (mins) 60      Vascular:    SHAYAN:   No results found.    Lab Values:    Lab Results   Component Value Date/Time    WBC 9.6 2021 08:20 AM    RBC 3.43 (L) 2021 08:20 AM    HEMOGLOBIN 9.8 (L) 2021 08:20 AM    HEMATOCRIT 31.1 (L) 2021 08:20 AM    CREACTPROT 0.48 06/10/2021 04:05 AM    SEDRATEWES 13 10/23/2018 08:02 AM    HBA1C 5.2 2021 01:30 AM      Culture Results show:  No results found for this or any previous visit (from the past 720 hour(s)).    Pain Level/Medicated:  Medicated with IV Dilaudid      INTERVENTIONS BY WOUND TEAM:  Chart and images reviewed. Discussed with bedside RN. All areas of concern (based on picture review, LDA review and discussion with bedside RN) have been thoroughly assessed. Documentation of areas based on significant findings. This RN in to assess patient. Performed standard wound care which includes appropriate positioning, dressing removal and non-selective debridement. Pictures and measurements obtained weekly if/when required.              ABD:   Preparation for Dressing removal:  Removed without difficulty.    Cleansed with: Wound cleanser and gauze   Sharp debridement: NA  Joya wound: Cleansed with Wound cleanser and gauze, No sting skin and strips of drape.    Primary Dressin piece of half thickness black foam applied to wound bed. Edges pulled together and secured with drape.   Secondary (Outer) Dressing:  Sealed with drape, and TRAC pad applied.     Interdisciplinary consultation: Patient, Bedside RN, Dr. Desouza, Wound RN ([place])     EVALUATION / RATIONALE FOR TREATMENT:  Most Recent Date:    21: wound continues to progress.  Was going to transfer to [facility] yesterday but patient became suicidal again and had 1:1 sitter overnight.       21: Wound  "bed clean and red with granular tissue. Continue NPWT.   6/30/21: Wound continues to progress with significantly less slough and wound is decreasing in size. Continued with NPWT.  6/28/21: Wound continues to have granular tissue. Pt heading to CT scan today. Wound has decreased in width significantly since dc of veraflo. Continued with NPWT.  06/25/21: wound continues to have granulation tissue.  Left abd with some protuding, Dr. natarajan updated and assessed.     6/23/21: wound continues to progress, transitioned to regular vac and continued to pull edges together.   6/21/21: Edges pulled together with jones by Dr. Rooney during change on Friday. Discussed with Lyly today. MD unable to be bedside today. Will continue with veraflo hopefully will be able to transition to regular vac at next visit to further assist with edges being pulled together. Meet with Dr. Rooney on Wednesday at 8am to assess wound.  6/18/21: continuing to decrease in slough in wound bed. One suture towards proximal end visible, per MD rooney, leave in place but monitor. Staples added to superior and inferior ends to assist with closure by MD. Continuing to pull edges together, MD will reassess on Monday with wound team to monitor for improvement and decide if surgical I&D is needed or if appropriate to switch to regular NPWT dressing instead of VF.   6/16/21: Wound with decreased slough. Wound appears to be \"flattening out.\" Veraflo is assisting with cleansing wound but is making pulling edges together difficult. Discussed with Dr. Rooney. Will plan to Meet with Dr. Rooney on Friday at 8am to assess wound, Dr. Rooney to pull superior and inferior borders together and staple to assist with closure.  6/14/21: Wound continues to granulate. Small amount of slough over fascial line. Discussed with Dr. Rooney. Plan to continue with veraflo, settings decreased. Will evaluate the wound with Dr. Rooney on Wednesday at 9am. Per MD may " consider OR washout with staples to distal proximal aspect to assist in wound closure vs. Adding staples at bedside.   06/11/21:  Wound looks good, minimal slough mainly to fascial line sutures.  VF grey foam layer to assist in debridement of slough.  Dr. Desouza to re-assess Monday and do surgical debridement if needed next week.   06/09/21:  Wound continues to progress, pull edges together to lessen width of wound. Dr. Desouza would like to see wound on Friday 6/7: Pt's wound appears to be improving per measurements and pictures. There is granulation tissue present.   06/5/21: Wound bed improving with increased granulation tissue.  Wound edges approximated prior to drape application.  NPWT continued.    06/03/21:  Fascial sutures intact, wound VAC placed with DR. Desouza.    6/1/21: Wound appears  than yesterdays pictures. Discussed with Dr. Desouza, plan to continue dakins TID for 48hrs, CT scan ordered today. Will re-evaluate on Thursday for potential vac placement.   5/31/21: Wound initially with interrupted staples and packing now with dehiscence. Per Dr. Baeza request wound team initiated dakins packing. Pt may benefit from NPWT to assist with closure.   5/8/21: wound nearly healed. Does not require advanced wound care. Wound team signing off and nursing to re consult if site worsens. Continue with dressing care per order.  4/21/21: abd wound improving. Nursing to continue dsg changes per order.  4/15: Right posterior thigh DTI now resolved. Pink and blanching. Abdominal wound to surface level continuing with dressing POC.  4/7/21: Thigh wound nearly healed and will likely be resolved at next assessment. Abdomen wound appears larger. Continued with Arti to move through inflammatory phase. Hydrofera blue for its bacteriostatic properties and to assist in drainage management.   03/31/21: thigh wound healing well now to a stage 2.  Continue current plan.  All interventions in place.   ABD more denuded  and bloody.  Arti to assist in moving wound through inflammatory phase.   3/23/21: Right posterior thigh wound remains a DTI but appears to be evolving into a stage 2. Wound team was previously consulted regarding sacral wound which appeared to be a skin tear however skin now appears to be intact however discolored. Does not appear to be pressure related as pt is on a waffle, self mobile and sacral mepilex is in use. Area is also not a normal pressure injury shape. Wound team will continue to follow.   3/18/21: Pt with linear DTIs to R posterior thigh from lying on top of frye cath tubing. Offloading precautions ordered. Wound team to continue following.     Goals: Steady decrease in wound area and depth weekly.    WOUND TEAM PLAN OF CARE ([X] for frequency of wound follow up,):   Nursing to follow orders written for wound care. Contact wound team if area fails to progress, deteriorates or with any questions/concerns  Dressing changes by wound team:                   Follow up 3 times weekly:                NPWT change 3 times weekly:   X  Follow up 1-2 times weekly:     Follow up Bi-Monthly:                   Follow up as needed:   Other (explain):     NURSING PLAN OF CARE ORDERS (X):  Dressing changes: See Dressing Care orders: X  Skin care: See Skin Care orders: x  RN Prevention Protocol: x  Rectal tube care: See Rectal Tube Care orders:   Other orders:     Anticipated discharge plans:   LTACH:        SNF/Rehab:                Group home vs SNF  Home Health Care:           Outpatient Wound Center:            Self/Family Care:        Other:

## 2021-07-07 NOTE — PROGRESS NOTES
Patient had a fall attempting to ambulate by himself.  Un-assisted.  Lost PIV access.  Dr. Lazo notified and fall forms completed.  New bed ordered.   negative...

## 2021-07-07 NOTE — DISCHARGE PLANNING
Agency/Facility Name: [facility]   Spoke To: Sathish   Outcome: Per Sathish, he will discuss Pt with DON as it is unlikely that [facility] could take this Pt back with suicidal ideations.  Once legal hold is cancelled for 24 hours, it can be brought up with DON

## 2021-07-07 NOTE — PROGRESS NOTES
Surgery    Patient remains hospital secondary to recurrent suicide ideations    VAC change with wound care today    Wound clean and healing, significant reduction in size from prior exam    VAC replaced by wound care nurse    Plan:  Open midline wound: Continue wound VAC  Status post colostomy reversal: Doing well from this point of view    No other acute surgical issues    Call us if there is any question or concern

## 2021-07-07 NOTE — PROGRESS NOTES
"      Spiritual Care Note    Patient Information     Patient's Name: Sebastián Castro   MRN: [id number]    YOB: 1965   Age and Gender: 56 y.o. male   Service Area: U   Room (and Bed): [place] [address]   Ethnicity or Nationality:     Primary Language: English   Restorationism/Spiritual preference: Adventist   Place of Residence: [place]   Family/Friends/Others Present: No   Clinical Team Present: No   Medical Diagnosis(-es)/Procedure(s): Syncope and collapse   Code Status: Full Code    Date of Admission: 2/9/2021   Length of Stay: 147 days        Spiritual Care Provider Information:  Name of Spiritual Care Provider: Ammy Hess  Title of Spiritual Care Provider: Associate   Phone Number: [phone]  E-mail: [email or web address]  Total time : 15 minutes    Encounter/Request Information  Encounter/Request Type   Visited With: Patient  Nature of the Visit: Follow-up, On shift  General Visit: Yes  Referral From/ Origin of Request: Epic nursing  Referral To:  (-)    Religous Needs/Values  Restorationism Needs Visit  Restorationism Needs: Prayer    Spiritual Assessment     Spiritual Care Encounters    Observations/Symptoms: Accepting, Confessing, Thankfulness    Interaction/Conversation: Pt reported having a relapse into SI which delayed his d/c. He is not currently feeling suicidal and states that psychiatry has cleared him for d/c. He is looking forward to \"going to rehab and walking again.\"  Pt said he was \"beating himself up\" about the SI. He and the  talked about self-compassion -- hugging oneself instead of beating oneself up -- and ways to observe SI without engaging with it. The pt requested prayer and thanked the .    Assessment: Need, Distress    Need: Seeking Spiritual Assistance and Support    Distress: Coping    Interventions: Compassionate presence, active listening, emotional support and reassurance, prayer.    Outcomes: Spiritual Comfort, Ability to Communicate with Truth and " Honesty    Plan: Daily Visits    Notes:

## 2021-07-07 NOTE — PROGRESS NOTES
Bedside report received.  Assessment complete.  A&O x 4. Patient calls appropriately. VSS on RA.  Patient is a max assist.   Pt reports no pain at this time. Discussed PRN medications if needed.  Denies N&V. Tolerating GI soft diet with 1500 mL fluid restriction.  MLI with wound vac in place, CDI.  + void, + flatus, last BM 07/06.  Patient denies SOB.  SCD's on.  Excoriation to inner thighs and buttocks. Barrier cream and barrier wipes used.  Corazon hold was removed during day shift after pt expressed thoughts of killing himself. Will continue to monitor for signs of SI.  Review plan with of care with patient. Call light and personal belongings with in reach. Hourly rounding in place. All needs met at this time.

## 2021-07-07 NOTE — PROGRESS NOTES
[facility] Medicine Daily Progress Note    Date of Service  7/7/2021    Chief Complaint  Sebastián Castro is a 56 y.o. male admitted 2/9/2021 with Syncope.    Hospital Course  Admitted with Syncope, he was noted to be bradycardic and hypotensive.  He has known history of permanent atrial fibrillation and he was on diltiazem, digoxin and metoprolol. These medications were held.  Later on he was restarted on low-dose Toprol and Digoxin. He was also started on anticoagulation with Eliquis.  He also had possible history of adrenal insufficiency, he was already on Florinef and midodrine.  Midodrine was discontinued and he was started on Cortisol. Further work-up showed that he had Gram-negative bacteremia as well as Urinary tract infection and Acute kidney injury.  He was initially placed on IV Zosyn, later changed to oral Bactrim to finish the course.  He was carefully hydrated with IV fluids. On admission he also expressed suicidal ideation. Psychiatry was consulted on the case, he was placed on a legal hold.  He was noted to have severe depression.  He has been placed on medication in the form of Geodon, Paxil, Klonopin. He has a colostomy in place, which the patient believes contributes to his severe depression because of ostomy care.  He had history of bowel perforation and splenic fluid collection requiring Segmental colectomy, colostomy creation, mobilization of splenic flexure, wound VAC placement, irrigation and debridement of flank wound on 11/10/2020.  Surgery was consulted on the case, and he underwent Exploratory laparotomy and colostomy takedown and closure on 5/27/2021.  Wound VAC was later placed. Psychiatry continued to follow the patient, his legal hold was discontinued on 6/4/2021.     Interval Problem Update  Colostomy - pain controlled  Afib - rate 70-90  Diarrhea - fecal lactoferrin positive, stool culture pending  Suicidal -seen by Psychiatry yesterday, legal hold discontinued, denies today  Low  magnesium    I have personally seen and examined the patient at bedside. I discussed the plan of care with patient, bedside RN, charge RN,  and Wound care.    Consultants/Specialty  cardiology, general surgery and psychiatry    Code Status  Full Code    Disposition  Patient is medically cleared.   Anticipate discharge to to skilled nursing facility.  I have placed the appropriate orders for post-discharge needs.    Review of Systems  Review of Systems   Constitutional: Negative for chills, diaphoresis, fever and malaise/fatigue.   HENT: Negative for congestion, hearing loss and sore throat.    Eyes: Negative for blurred vision.   Respiratory: Negative for cough, shortness of breath and wheezing.    Cardiovascular: Negative for chest pain, palpitations and leg swelling.   Gastrointestinal: Positive for abdominal pain and diarrhea. Negative for blood in stool, heartburn, melena, nausea and vomiting.   Genitourinary: Negative for dysuria, flank pain and hematuria.   Musculoskeletal: Negative for back pain, joint pain, myalgias and neck pain.   Skin: Negative for rash.   Neurological: Negative for dizziness, sensory change, speech change, focal weakness, weakness and headaches.   Psychiatric/Behavioral: Positive for depression. Negative for hallucinations and suicidal ideas. The patient is not nervous/anxious.         Physical Exam  Temp:  [36.2 °C (97.1 °F)-36.6 °C (97.8 °F)] 36.6 °C (97.8 °F)  Pulse:  [72-92] 92  Resp:  [16-17] 16  BP: (106-130)/(61-86) 124/74  SpO2:  [95 %-97 %] 96 %    Physical Exam  Vitals and nursing note reviewed.   Constitutional:       Appearance: He is obese.   HENT:      Head: Normocephalic and atraumatic.      Nose: No congestion.      Mouth/Throat:      Mouth: Mucous membranes are moist.   Eyes:      Conjunctiva/sclera: Conjunctivae normal.   Cardiovascular:      Rate and Rhythm: Normal rate. Rhythm irregularly irregular.   Pulmonary:      Effort: Pulmonary effort is normal.       Breath sounds: Normal breath sounds.   Abdominal:      General: There is no distension.      Tenderness: There is no abdominal tenderness. There is no guarding or rebound.      Comments: Wound VAC in place   Musculoskeletal:      Cervical back: No muscular tenderness.      Right lower leg: No edema.      Left lower leg: No edema.   Skin:     General: Skin is warm and dry.   Neurological:      General: No focal deficit present.      Mental Status: He is alert and oriented to person, place, and time.      Cranial Nerves: No cranial nerve deficit.   Psychiatric:         Mood and Affect: Mood is depressed.         Behavior: Behavior is cooperative.         Thought Content: Thought content is not paranoid or delusional. Thought content does not include homicidal or suicidal ideation. Thought content does not include homicidal or suicidal plan.         Fluids    Intake/Output Summary (Last 24 hours) at 7/7/2021 1149  Last data filed at 7/7/2021 0744  Gross per 24 hour   Intake 720 ml   Output 1375 ml   Net -655 ml       Laboratory  Recent Labs     07/06/21  0820   WBC 9.6   RBC 3.43*   HEMOGLOBIN 9.8*   HEMATOCRIT 31.1*   MCV 90.7   MCH 28.6   MCHC 31.5*   RDW 50.3*   PLATELETCT 266   MPV 10.5     Recent Labs     07/06/21  0820 07/07/21  0833   SODIUM 135 136   POTASSIUM 5.4 4.4   CHLORIDE 103 103   CO2 25 26   GLUCOSE 112* 124*   BUN 28* 26*   CREATININE 1.19 1.22   CALCIUM 8.8 9.0                   Imaging  CT-ABDOMEN-PELVIS W/O   Final Result         1. Liquid stool in the distal colon, consistent with diarrhea.   2. Unchanged perirectal fat stranding, sequela of nonspecific proctitis.   3. Cholelithiasis. No cholecystitis.   4. Decreased blood pool attenuation, suggesting anemia.   5. Chronic T11 and T12 compression fractures.      US-RENAL   Final Result      1.  Low-level echogenic debris in the urinary bladder is noted which could indicate UTI or blood within the bladder.      2.  No hydronephrosis.       IR-MIDLINE CATHETER INSERTION WO GUIDANCE > AGE 3   Final Result                  Ultrasound-guided midline placement performed by qualified nursing staff    as above.          EC-ECHOCARDIOGRAM COMPLETE W/ CONT   Final Result      DX-CHEST-PORTABLE (1 VIEW)   Final Result      1.  Minor asymmetric interstitial opacity in the right lower lobe which could represent minor interstitial edema, pneumonia, or fibrotic change.      2.  No left lung consolidation.      3.  No evidence of congestive failure.      [id number] VIEW   Final Result         No specific finding to suggest small bowel obstruction.      [id number] VIEW   Final Result         No significant interval change.      DX-CHEST-LIMITED (1 VIEW)   Final Result         Diffuse interstitial prominence could relate to mild pulmonary edema or atypical infection      [id number] VIEW   Final Result      Increased colonic gas without definite bowel obstruction.      US-RENAL   Final Result      No evidence of hydronephrosis.      Lobulated kidneys bilaterally.      CT-ABDOMEN-PELVIS WITH   Final Result      1.  There is no evidence of small bowel obstruction.   2.  There is a left lower quadrant ostomy.   3.  There is fluid distention of the colon distal to the surgical material in the left mid descending colon extending all the way to the rectum. There is no pneumatosis or free air.   4.  There is cholelithiasis without biliary dilatation.   5.  There has been interval removal of the drainage catheter anterior to the spleen with minimal hypodense area in the anterior spleen again noted. There is no new fluid collection.      IR-US GUIDED PIV   Final Result    Ultrasound-guided PERIPHERAL IV INSERTION performed by    qualified nursing staff as above.      EC-ECHOCARDIOGRAM COMPLETE W/O CONT   Final Result      DX-LUMBAR SPINE-2 OR 3 VIEWS   Final Result      Moderate compression deformity of T11 is new compared to 2018.      Mild wedge deformity of T12 is  unchanged.      Degenerative changes including facet arthropathy.      Mild retrolisthesis of L5 on S1 and L3 on L4.              Assessment/Plan  Urinary tract infection- (present on admission)  Assessment & Plan  Completed 10-day course of Bactrim    Gram-negative bacteremia- (present on admission)  Assessment & Plan  Completed 10-day course of Bactrim    Major depressive disorder, recurrent, severe with psychotic features (HCC)- (present on admission)  Assessment & Plan  Geodon, Paxil, Klonopin    Colostomy present (HCC)- (present on admission)  Assessment & Plan  Exploratory laparotomy with colostomy takedown and closure 5/27/2021  Wound care, pain control    Suicidal ideation- (present on admission)  Assessment & Plan  Legal hold discontinued 6/4/2021  Legal hold discontinued 7/6/2021    Diarrhea- (present on admission)  Assessment & Plan  C diff negative  Fecal lactoferrin positive  stool culture pending    Normocytic anemia- (present on admission)  Assessment & Plan  Follow cbc    Leukocytosis- (present on admission)  Assessment & Plan  Follow cbc    Obstructive uropathy- (present on admission)  Assessment & Plan  resolved    Permanent atrial fibrillation (HCC)- (present on admission)  Assessment & Plan  Metoprolol, Eliquis    Chronic heart failure with preserved ejection fraction (HCC)- (present on admission)  Assessment & Plan  Metoprolol  Fluid restriction of 1.5 L per day      Type 2 diabetes mellitus with hyperglycemia, with long-term current use of insulin (HCC)- (present on admission)  Assessment & Plan  Follow bmp    Hypothyroidism- (present on admission)  Assessment & Plan  Increased Levothyroxine to 150 mcg  Check TSH on 8/6/2021    SRUTHI (acute kidney injury) (HCC)- (present on admission)  Assessment & Plan  Resolved    Adrenal insufficiency (HCC)- (present on admission)  Assessment & Plan  Cortef 5 mg - decreased to daily (7/6/2021)      Debility- (present on admission)  Assessment & Plan  PT and  OT    Orthostatic hypotension- (present on admission)  Assessment & Plan  off Florinef.    Hypomagnesemia- (present on admission)  Assessment & Plan  IV Mg 2 g  Follow level    Hypokalemia- (present on admission)  Assessment & Plan  Follow bmp    Chronic venous insufficiency of lower extremity- (present on admission)  Assessment & Plan  compression stockings.    Mixed hyperlipidemia- (present on admission)  Assessment & Plan  Atorvastatin.     Lower limb amputation, great toe (HCC)- (present on admission)  Assessment & Plan  Monitor    Obesity due to excess calories with serious comorbidity- (present on admission)  Assessment & Plan  Body mass index is 50.08 kg/m².       VTE prophylaxis: therapeutic anticoagulation with Eliquis    I have performed a physical exam and reviewed and updated ROS and Plan today (7/7/2021). In review of yesterday's note (7/6/2021), there are no changes except as documented above.

## 2021-07-07 NOTE — THERAPY
Physical Therapy   Daily Treatment     Patient Name: Sebastián Castro  Age:  56 y.o., Sex:  male  Medical Record #: [id number]  Today's Date: 7/6/2021     Precautions: Fall Risk    Assessment    Pt conts to make slow progress toward mobility goals. He demonstrated improvements however both in bed mob and ambulation today. He conts to require max A for STS transition, worked on sequencing via multiple reps. Once he is upright he is able to maintain standing balance with guarding and was able to amb with 4WW around bed x2 today. PT to cont to follow toward goals.     Plan    Continue current treatment plan.    DC Equipment Recommendations: Unable to determine at this time  Discharge Recommendations: Recommend post-acute placement for additional physical therapy services prior to discharge home     Objective     07/06/21 1601   Cognition    Level of Consciousness Alert   New Learning Impaired   Attention Impaired   Comments flat affect, cooperative, poor carryover to cues to improve mobility   Balance   Sitting Balance (Static) Fair +   Sitting Balance (Dynamic) Fair   Standing Balance (Static) Fair   Standing Balance (Dynamic) Fair -   Gait Analysis   Gait Level Of Assist Supervised (CGA for safety)   Assistive Device 4 Wheel Walker   Distance (Feet) 10   # of Times Distance was Traveled 2   Deviation Bradykinetic;Shuffled Gait   Weight Bearing Status no restrictions   Bed Mobility    Supine to Sit Moderate Assist   Sit to Supine Minimal Assist   Functional Mobility   Sit to Stand Maximal Assist   Bed, Chair, Wheelchair Transfer Maximal Assist (max A primarily for STS component)   Short Term Goals    Short Term Goal # 1 Pt will perform supine<->sit with supv within 6 visits in order to return home   Goal Outcome # 1 goal not met   Short Term Goal # 2 Pt will transfer bed<->chair with 4WW with supv within 6 visits in order to return home   Goal Outcome # 2 Goal not met   Short Term Goal # 3 Pt will ambulate 100' with supv  within 6 visits in order to return home   Goal Outcome # 3 Progressing slower than expected   Short Term Goal # 4 Pt will ascend/descend 5 steps with supv within 6 visits in order to enter/exit home   Goal Outcome # 4 Goal not met

## 2021-07-08 LAB
ANION GAP SERPL CALC-SCNC: 10 MMOL/L (ref 7–16)
BUN SERPL-MCNC: 20 MG/DL (ref 8–22)
CALCIUM SERPL-MCNC: 8.9 MG/DL (ref 8.5–10.5)
CHLORIDE SERPL-SCNC: 97 MMOL/L (ref 96–112)
CO2 SERPL-SCNC: 25 MMOL/L (ref 20–33)
CREAT SERPL-MCNC: 1.26 MG/DL (ref 0.5–1.4)
E COLI SXT1+2 STL IA: NORMAL
ERYTHROCYTE [DISTWIDTH] IN BLOOD BY AUTOMATED COUNT: 47.8 FL (ref 35.9–50)
GLUCOSE SERPL-MCNC: 139 MG/DL (ref 65–99)
HCT VFR BLD AUTO: 35 % (ref 42–52)
HGB BLD-MCNC: 11.1 G/DL (ref 14–18)
MAGNESIUM SERPL-MCNC: 1.6 MG/DL (ref 1.5–2.5)
MCH RBC QN AUTO: 28.5 PG (ref 27–33)
MCHC RBC AUTO-ENTMCNC: 31.7 G/DL (ref 33.7–35.3)
MCV RBC AUTO: 90 FL (ref 81.4–97.8)
PLATELET # BLD AUTO: 375 K/UL (ref 164–446)
PMV BLD AUTO: 10.2 FL (ref 9–12.9)
POTASSIUM SERPL-SCNC: 4.2 MMOL/L (ref 3.6–5.5)
RBC # BLD AUTO: 3.89 M/UL (ref 4.7–6.1)
SIGNIFICANT IND 70042: NORMAL
SITE SITE: NORMAL
SODIUM SERPL-SCNC: 132 MMOL/L (ref 135–145)
SOURCE SOURCE: NORMAL
WBC # BLD AUTO: 10.2 K/UL (ref 4.8–10.8)

## 2021-07-08 PROCEDURE — 700102 HCHG RX REV CODE 250 W/ 637 OVERRIDE(OP): Performed by: INTERNAL MEDICINE

## 2021-07-08 PROCEDURE — 83735 ASSAY OF MAGNESIUM: CPT

## 2021-07-08 PROCEDURE — 97535 SELF CARE MNGMENT TRAINING: CPT

## 2021-07-08 PROCEDURE — A9270 NON-COVERED ITEM OR SERVICE: HCPCS | Performed by: INTERNAL MEDICINE

## 2021-07-08 PROCEDURE — 770001 HCHG ROOM/CARE - MED/SURG/GYN PRIV*

## 2021-07-08 PROCEDURE — 700102 HCHG RX REV CODE 250 W/ 637 OVERRIDE(OP): Performed by: FAMILY MEDICINE

## 2021-07-08 PROCEDURE — 85027 COMPLETE CBC AUTOMATED: CPT

## 2021-07-08 PROCEDURE — 80048 BASIC METABOLIC PNL TOTAL CA: CPT

## 2021-07-08 PROCEDURE — 700111 HCHG RX REV CODE 636 W/ 250 OVERRIDE (IP): Performed by: FAMILY MEDICINE

## 2021-07-08 PROCEDURE — 700102 HCHG RX REV CODE 250 W/ 637 OVERRIDE(OP): Performed by: STUDENT IN AN ORGANIZED HEALTH CARE EDUCATION/TRAINING PROGRAM

## 2021-07-08 PROCEDURE — 36415 COLL VENOUS BLD VENIPUNCTURE: CPT

## 2021-07-08 PROCEDURE — A9270 NON-COVERED ITEM OR SERVICE: HCPCS | Performed by: STUDENT IN AN ORGANIZED HEALTH CARE EDUCATION/TRAINING PROGRAM

## 2021-07-08 PROCEDURE — 97530 THERAPEUTIC ACTIVITIES: CPT

## 2021-07-08 PROCEDURE — 99232 SBSQ HOSP IP/OBS MODERATE 35: CPT | Performed by: FAMILY MEDICINE

## 2021-07-08 PROCEDURE — A9270 NON-COVERED ITEM OR SERVICE: HCPCS | Performed by: FAMILY MEDICINE

## 2021-07-08 RX ORDER — MAGNESIUM SULFATE HEPTAHYDRATE 40 MG/ML
2 INJECTION, SOLUTION INTRAVENOUS ONCE
Status: COMPLETED | OUTPATIENT
Start: 2021-07-08 | End: 2021-07-08

## 2021-07-08 RX ADMIN — CLONAZEPAM 1 MG: 1 TABLET ORAL at 18:32

## 2021-07-08 RX ADMIN — APIXABAN 5 MG: 5 TABLET, FILM COATED ORAL at 04:51

## 2021-07-08 RX ADMIN — CLONAZEPAM 0.5 MG: 0.5 TABLET ORAL at 08:21

## 2021-07-08 RX ADMIN — METOPROLOL TARTRATE 25 MG: 25 TABLET, FILM COATED ORAL at 04:51

## 2021-07-08 RX ADMIN — APIXABAN 5 MG: 5 TABLET, FILM COATED ORAL at 18:32

## 2021-07-08 RX ADMIN — OMEPRAZOLE 40 MG: 20 CAPSULE, DELAYED RELEASE ORAL at 04:51

## 2021-07-08 RX ADMIN — MAGNESIUM SULFATE 2 G: 2 INJECTION INTRAVENOUS at 18:32

## 2021-07-08 RX ADMIN — PAROXETINE HYDROCHLORIDE 20 MG: 20 TABLET, FILM COATED ORAL at 08:21

## 2021-07-08 RX ADMIN — LEVOTHYROXINE SODIUM 150 MCG: 0.03 TABLET ORAL at 04:51

## 2021-07-08 RX ADMIN — ZIPRASIDONE HYDROCHLORIDE 60 MG: 60 CAPSULE ORAL at 20:21

## 2021-07-08 RX ADMIN — CALCIUM POLYCARBOPHIL 625 MG: 625 TABLET, FILM COATED ORAL at 18:32

## 2021-07-08 RX ADMIN — METOPROLOL TARTRATE 25 MG: 25 TABLET, FILM COATED ORAL at 18:32

## 2021-07-08 RX ADMIN — Medication 1000 UNITS: at 04:51

## 2021-07-08 RX ADMIN — AMIODARONE HYDROCHLORIDE 200 MG: 200 TABLET ORAL at 04:51

## 2021-07-08 RX ADMIN — ZIPRASIDONE HYDROCHLORIDE 60 MG: 60 CAPSULE ORAL at 08:21

## 2021-07-08 RX ADMIN — HYDROCORTISONE 5 MG: 5 TABLET ORAL at 04:52

## 2021-07-08 RX ADMIN — ATORVASTATIN CALCIUM 40 MG: 40 TABLET, FILM COATED ORAL at 18:32

## 2021-07-08 RX ADMIN — OMEPRAZOLE 40 MG: 20 CAPSULE, DELAYED RELEASE ORAL at 18:32

## 2021-07-08 RX ADMIN — TAMSULOSIN HYDROCHLORIDE 0.4 MG: 0.4 CAPSULE ORAL at 08:21

## 2021-07-08 RX ADMIN — CALCIUM POLYCARBOPHIL 625 MG: 625 TABLET, FILM COATED ORAL at 08:21

## 2021-07-08 ASSESSMENT — ENCOUNTER SYMPTOMS
DIZZINESS: 0
SORE THROAT: 0
CHILLS: 0
BLURRED VISION: 0
FEVER: 0
FOCAL WEAKNESS: 0
SENSORY CHANGE: 0
HALLUCINATIONS: 0
HEADACHES: 0
WEAKNESS: 0
BACK PAIN: 0
DIARRHEA: 1
HEARTBURN: 0
WHEEZING: 0
VOMITING: 0
NAUSEA: 0
MYALGIAS: 0
SPEECH CHANGE: 0
DEPRESSION: 1
COUGH: 0
DIAPHORESIS: 0
FLANK PAIN: 0
BLOOD IN STOOL: 0
NECK PAIN: 0
SHORTNESS OF BREATH: 0
ABDOMINAL PAIN: 1
NERVOUS/ANXIOUS: 0
PALPITATIONS: 0

## 2021-07-08 ASSESSMENT — GAIT ASSESSMENTS: DISTANCE (FEET): 25

## 2021-07-08 ASSESSMENT — COGNITIVE AND FUNCTIONAL STATUS - GENERAL
DAILY ACTIVITIY SCORE: 16
DRESSING REGULAR LOWER BODY CLOTHING: A LOT
PERSONAL GROOMING: A LITTLE
HELP NEEDED FOR BATHING: A LOT
SUGGESTED CMS G CODE MODIFIER DAILY ACTIVITY: CK
DRESSING REGULAR UPPER BODY CLOTHING: A LITTLE
TOILETING: A LOT

## 2021-07-08 ASSESSMENT — FIBROSIS 4 INDEX: FIB4 SCORE: 0.77

## 2021-07-08 ASSESSMENT — PAIN DESCRIPTION - PAIN TYPE: TYPE: ACUTE PAIN

## 2021-07-08 NOTE — PROGRESS NOTES
Report received. Assessment completed.  Pt is A&O x4.   Pt on room air.   Medicating for pain PRN per MAR, no c/o pan or nausea today  Midline wound vac in place, CDI   Redness and excoriation to inner thighs, buttocks and pannus.  Frequent incontinent changes in place, Q2h turns, barrier cream, and interdry in use   +loose incontinent stool today   +void.  Tolerating diet. Educated on fluid restriction.  Pt up with 2 assist and FWW.  Call light and belongings within reach. All needs met at this time. Fall Precautions and hourly rounding in place. Bed alarm on..

## 2021-07-08 NOTE — DISCHARGE PLANNING
Agency/Facility Name: [facility]   Outcome: Left vmail for admissions regarding re-acceptance after legal hold     -[phone]  Agency/Facility Name: [facility]   Spoke To: Sathish  Outcome: Sathish states he will check on the wound vac and could possibly accept tomorrow     RN CM notified     MDs notified

## 2021-07-08 NOTE — PROGRESS NOTES
[facility] Medicine Daily Progress Note    Date of Service  7/8/2021    Chief Complaint  Sebastián Castro is a 56 y.o. male admitted 2/9/2021 with Syncope.    Hospital Course  Admitted with Syncope, he was noted to be bradycardic and hypotensive.  He has known history of permanent atrial fibrillation and he was on diltiazem, digoxin and metoprolol. These medications were held.  Later on he was restarted on low-dose Toprol and Digoxin. He was also started on anticoagulation with Eliquis.  He also had possible history of adrenal insufficiency, he was already on Florinef and midodrine.  Midodrine was discontinued and he was started on Cortisol. Further work-up showed that he had Gram-negative bacteremia as well as Urinary tract infection and Acute kidney injury.  He was initially placed on IV Zosyn, later changed to oral Bactrim to finish the course.  He was carefully hydrated with IV fluids. On admission he also expressed suicidal ideation. Psychiatry was consulted on the case, he was placed on a legal hold.  He was noted to have severe depression.  He has been placed on medication in the form of Geodon, Paxil, Klonopin. He has a colostomy in place, which the patient believes contributes to his severe depression because of ostomy care.  He had history of bowel perforation and splenic fluid collection requiring Segmental colectomy, colostomy creation, mobilization of splenic flexure, wound VAC placement, irrigation and debridement of flank wound on 11/10/2020.  Surgery was consulted on the case, and he underwent Exploratory laparotomy and colostomy takedown and closure on 5/27/2021.  Wound VAC was later placed. Psychiatry continued to follow the patient, his legal hold was discontinued on 6/4/2021.     Interval Problem Update  Colostomy - pain controlled  Afib - rate   Diarrhea - fecal lactoferrin positive, stool culture pending  Suicidal - seen by Psychiatry, legal hold discontinued, denies today  Low magnesium    I  have personally seen and examined the patient at bedside. I discussed the plan of care with patient, bedside RN, charge RN and .    Consultants/Specialty  cardiology, general surgery and psychiatry    Code Status  Full Code    Disposition  Patient is medically cleared.   Anticipate discharge to to skilled nursing facility.  I have placed the appropriate orders for post-discharge needs.    Review of Systems  Review of Systems   Constitutional: Negative for chills, diaphoresis, fever and malaise/fatigue.   HENT: Negative for congestion, hearing loss and sore throat.    Eyes: Negative for blurred vision.   Respiratory: Negative for cough, shortness of breath and wheezing.    Cardiovascular: Negative for chest pain, palpitations and leg swelling.   Gastrointestinal: Positive for abdominal pain and diarrhea. Negative for blood in stool, heartburn, melena, nausea and vomiting.   Genitourinary: Negative for dysuria, flank pain and hematuria.   Musculoskeletal: Negative for back pain, joint pain, myalgias and neck pain.   Skin: Negative for rash.   Neurological: Negative for dizziness, sensory change, speech change, focal weakness, weakness and headaches.   Psychiatric/Behavioral: Positive for depression. Negative for hallucinations and suicidal ideas. The patient is not nervous/anxious.         Physical Exam  Temp:  [35.8 °C (96.5 °F)-36.6 °C (97.8 °F)] 35.9 °C (96.7 °F)  Pulse:  [] 99  Resp:  [16-18] 17  BP: (102-155)/(60-94) 102/60  SpO2:  [94 %-95 %] 94 %    Physical Exam  Vitals and nursing note reviewed.   Constitutional:       Appearance: He is obese.   HENT:      Head: Normocephalic and atraumatic.      Nose: No congestion.      Mouth/Throat:      Mouth: Mucous membranes are moist.   Eyes:      Conjunctiva/sclera: Conjunctivae normal.   Cardiovascular:      Rate and Rhythm: Normal rate. Rhythm irregularly irregular.   Pulmonary:      Effort: Pulmonary effort is normal.      Breath sounds: Normal  breath sounds.   Abdominal:      General: There is no distension.      Tenderness: There is no abdominal tenderness. There is no guarding or rebound.      Comments: Wound VAC in place   Musculoskeletal:      Cervical back: No muscular tenderness.      Right lower leg: No edema.      Left lower leg: No edema.   Skin:     General: Skin is warm and dry.   Neurological:      General: No focal deficit present.      Mental Status: He is alert and oriented to person, place, and time.      Cranial Nerves: No cranial nerve deficit.   Psychiatric:         Mood and Affect: Mood is depressed.         Behavior: Behavior is cooperative.         Thought Content: Thought content is not paranoid or delusional. Thought content does not include homicidal or suicidal ideation. Thought content does not include homicidal or suicidal plan.         Fluids    Intake/Output Summary (Last 24 hours) at 7/8/2021 1546  Last data filed at 7/8/2021 1200  Gross per 24 hour   Intake 960 ml   Output 2950 ml   Net -1990 ml       Laboratory  Recent Labs     07/06/21  0820 07/08/21  1311   WBC 9.6 10.2   RBC 3.43* 3.89*   HEMOGLOBIN 9.8* 11.1*   HEMATOCRIT 31.1* 35.0*   MCV 90.7 90.0   MCH 28.6 28.5   MCHC 31.5* 31.7*   RDW 50.3* 47.8   PLATELETCT 266 375   MPV 10.5 10.2     Recent Labs     07/06/21  0820 07/07/21  0833 07/08/21  1311   SODIUM 135 136 132*   POTASSIUM 5.4 4.4 4.2   CHLORIDE 103 103 97   CO2 25 26 25   GLUCOSE 112* 124* 139*   BUN 28* 26* 20   CREATININE 1.19 1.22 1.26   CALCIUM 8.8 9.0 8.9                   Imaging  IR-US GUIDED PIV   Final Result    Ultrasound-guided PERIPHERAL IV INSERTION performed by    qualified nursing staff as above.      CT-HEAD W/O   Final Result      1.  Cerebral atrophy.      2.  White matter lucencies most consistent with small vessel ischemic change versus demyelination or gliosis.      3.  Otherwise, Head CT without contrast with no acute findings. No evidence of acute cerebral infarction, hemorrhage or  mass lesion.      CT-ABDOMEN-PELVIS W/O   Final Result         1. Liquid stool in the distal colon, consistent with diarrhea.   2. Unchanged perirectal fat stranding, sequela of nonspecific proctitis.   3. Cholelithiasis. No cholecystitis.   4. Decreased blood pool attenuation, suggesting anemia.   5. Chronic T11 and T12 compression fractures.      US-RENAL   Final Result      1.  Low-level echogenic debris in the urinary bladder is noted which could indicate UTI or blood within the bladder.      2.  No hydronephrosis.      IR-MIDLINE CATHETER INSERTION WO GUIDANCE > AGE 3   Final Result                  Ultrasound-guided midline placement performed by qualified nursing staff    as above.          EC-ECHOCARDIOGRAM COMPLETE W/ CONT   Final Result      DX-CHEST-PORTABLE (1 VIEW)   Final Result      1.  Minor asymmetric interstitial opacity in the right lower lobe which could represent minor interstitial edema, pneumonia, or fibrotic change.      2.  No left lung consolidation.      3.  No evidence of congestive failure.      WH-[id number]-0 VIEW   Final Result         No specific finding to suggest small bowel obstruction.      GH-AWLWMLR-0 VIEW   Final Result         No significant interval change.      DX-CHEST-LIMITED (1 VIEW)   Final Result         Diffuse interstitial prominence could relate to mild pulmonary edema or atypical infection      UU-[id number]-5 VIEW   Final Result      Increased colonic gas without definite bowel obstruction.      US-RENAL   Final Result      No evidence of hydronephrosis.      Lobulated kidneys bilaterally.      CT-ABDOMEN-PELVIS WITH   Final Result      1.  There is no evidence of small bowel obstruction.   2.  There is a left lower quadrant ostomy.   3.  There is fluid distention of the colon distal to the surgical material in the left mid descending colon extending all the way to the rectum. There is no pneumatosis or free air.   4.  There is cholelithiasis without biliary dilatation.    5.  There has been interval removal of the drainage catheter anterior to the spleen with minimal hypodense area in the anterior spleen again noted. There is no new fluid collection.      IR-US GUIDED PIV   Final Result    Ultrasound-guided PERIPHERAL IV INSERTION performed by    qualified nursing staff as above.      EC-ECHOCARDIOGRAM COMPLETE W/O CONT   Final Result      DX-LUMBAR SPINE-2 OR 3 VIEWS   Final Result      Moderate compression deformity of T11 is new compared to 2018.      Mild wedge deformity of T12 is unchanged.      Degenerative changes including facet arthropathy.      Mild retrolisthesis of L5 on S1 and L3 on L4.              Assessment/Plan  Urinary tract infection- (present on admission)  Assessment & Plan  Completed 10-day course of Bactrim    Gram-negative bacteremia- (present on admission)  Assessment & Plan  Completed 10-day course of Bactrim    Major depressive disorder, recurrent, severe with psychotic features (HCC)- (present on admission)  Assessment & Plan  Geodon, Paxil, Klonopin    Colostomy present (HCC)- (present on admission)  Assessment & Plan  Exploratory laparotomy with colostomy takedown and closure 5/27/2021  Wound care, pain control    Suicidal ideation- (present on admission)  Assessment & Plan  Legal hold discontinued 6/4/2021  Legal hold discontinued 7/6/2021    Diarrhea- (present on admission)  Assessment & Plan  C diff negative  Fecal lactoferrin positive  stool culture negative    Normocytic anemia- (present on admission)  Assessment & Plan  Follow cbc    Leukocytosis- (present on admission)  Assessment & Plan  Follow cbc    Obstructive uropathy- (present on admission)  Assessment & Plan  resolved    Permanent atrial fibrillation (HCC)- (present on admission)  Assessment & Plan  Metoprolol, Eliquis    Chronic heart failure with preserved ejection fraction (HCC)- (present on admission)  Assessment & Plan  Metoprolol  Fluid restriction of 1.5 L per day      Type 2  diabetes mellitus with hyperglycemia, with long-term current use of insulin (HCC)- (present on admission)  Assessment & Plan  Follow bmp    Hypothyroidism- (present on admission)  Assessment & Plan  Increased Levothyroxine to 150 mcg  Check TSH on 8/6/2021    SRUTHI (acute kidney injury) (HCC)- (present on admission)  Assessment & Plan  Resolved    Adrenal insufficiency (HCC)- (present on admission)  Assessment & Plan  Cortef 5 mg - decreased to daily (7/6/2021)      Debility- (present on admission)  Assessment & Plan  PT and OT    Orthostatic hypotension- (present on admission)  Assessment & Plan  off Florinef.    Hypomagnesemia- (present on admission)  Assessment & Plan  IV Mg 2 g  Follow level    Hypokalemia- (present on admission)  Assessment & Plan  Follow bmp    Chronic venous insufficiency of lower extremity- (present on admission)  Assessment & Plan  compression stockings.    Mixed hyperlipidemia- (present on admission)  Assessment & Plan  Atorvastatin.     Lower limb amputation, great toe (HCC)- (present on admission)  Assessment & Plan  Monitor    Obesity due to excess calories with serious comorbidity- (present on admission)  Assessment & Plan  Body mass index is 50.08 kg/m².       VTE prophylaxis: therapeutic anticoagulation with Eliquis    I have performed a physical exam and reviewed and updated ROS and Plan today (7/8/2021). In review of yesterday's note (7/7/2021), there are no changes except as documented above.

## 2021-07-08 NOTE — THERAPY
Occupational Therapy  Daily Treatment     Patient Name: Sebastián Castro  Age:  56 y.o., Sex:  male  Medical Record #: [id number]  Today's Date: 7/8/2021     Precautions  Precautions: (P) Fall Risk  Comments: (P) wound vac to abdomen    Assessment    Slow but steadily improving activity tolerance, strength and level of function noted. Motivated to participate. Noted supervised level for supine to EOB, stand with 4WW and mobilize to door of room.returned to bedside chair with SBA. CG to sit, cues to control descent into seat. Seated grooming and light ADL tasks with set up. Continued leakage of BM during standing/ transfers observed. Mod cues for weightshift and technique during STS from low chair, tends to push backwards, instead of keeping weight forward.    Plan    Continue current treatment plan.    DC Equipment Recommendations: (P) Unable to determine at this time  Discharge Recommendations: (P) Recommend post-acute placement for additional occupational therapy services prior to discharge home    Subjective    Pleasant and cooperative, motivated to participate. Noted steady improvement in activity tolerance     Objective       07/08/21 1120   Total Time Spent   Total Time Spent (Mins)    Treatment Charges   OT Self Care / ADL 2   OT Therapy Activity 1   Precautions   Precautions Fall Risk   Comments wound vac to abdomen   Vitals   O2 Delivery Device None - Room Air   Pain 0 - 10 Group   Therapist Pain Assessment During Activity;Post Activity Pain Same as Prior to Activity;Nurse Notified;0   Cognition    Level of Consciousness Alert   Comments flat affect, cooperstive, pleasant   Level of Consciousness Alert   Passive ROM Upper Body   Passive ROM Upper Body WDL   Active ROM Upper Body   Active ROM Upper Body  WDL   Dominant Hand Right   Strength Upper Body   Gross Strength Generalized Weakness, Equal Bilaterally.    Sensation Upper Body   Upper Extremity Sensation  WDL   Upper Body Muscle Tone   Upper Body Muscle Tone   WDL   Standing Upper Body Exercises   Comments standing tolerance activity, priod of 4 minutes, using 4WW   Neuro-Muscular Treatments   Neuro-Muscular Treatments Anterior weight shift;Weight Shift Right;Weight Shift Left   Comments rocking, forward weightshift during sit to stand, tends to lean backwards during initial stage, which makes standing from chair very difficult   Other Treatments   Other Treatments Provided STS x 4 reps using 4WW and assist of 1-2 persons   Balance   Sitting Balance (Static) Fair +   Sitting Balance (Dynamic) Fair   Standing Balance (Static) Fair   Standing Balance (Dynamic) Fair -   Weight Shift Sitting Fair   Weight Shift Standing Fair   Skilled Intervention Verbal Cuing;Tactile Cuing;Sequencing;Compensatory Strategies   Bed Mobility    Supine to Sit Supervised   Sit to Supine   (left seated in bedside chair)   Scooting Minimal Assist   Skilled Intervention Verbal Cuing;Tactile Cuing;Compensatory Strategies   Activities of Daily Living   Eating Modified Independent   Grooming Supervision;Seated   Upper Body Dressing Minimal Assist   Lower Body Dressing Moderate Assist   Toileting Maximal Assist  (incontinent of BM during mobility/ standing)   Skilled Intervention Compensatory Strategies;Verbal Cuing;Tactile Cuing   How much help from another person does the patient currently need...   Putting on and taking off regular lower body clothing? 2   Bathing (including washing, rinsing, and drying)? 2   Toileting, which includes using a toilet, bedpan, or urinal? 2   Putting on and taking off regular upper body clothing? 3   Taking care of personal grooming such as brushing teeth? 3   Eating meals? 4   6 Clicks Daily Activity Score 16   Functional Mobility   Sit to Stand Minimal Assist  (increased asssit from lower chair, max cues for technique)   Bed, Chair, Wheelchair Transfer Moderate Assist   Transfer Method Stand Pivot   Distance (Feet) 25   # of Times Distance was Traveled 2   Comments  using 4WW   Visual Perception   Visual Perception  WDL   Activity Tolerance   Sitting Edge of Bed 5 mins   Standing 8 mins total   Comments slow but steady improvement noted   Patient / Family Goals   Patient / Family Goal #1 To feel better   Goal #1 Outcome Progressing as expected   Short Term Goals   Short Term Goal # 1 Pt will perform LB dressing w/ min A   Goal Outcome # 1 Progressing slower than expected   Short Term Goal # 2 Pt will perform functional t/f's with supervision   Goal Outcome # 2 Progressing slower than expected   Short Term Goal # 3 Pt will perform h/g standing sink side with supervision   Goal Outcome # 3 Progressing slower than expected   Education Group   Role of Occupational Therapist Patient Response Patient;Acceptance;Explanation;Demonstration;Verbal Demonstration;Action Demonstration   Transfers Patient Response Patient;Acceptance;Explanation;Demonstration;Verbal Demonstration;Reinforcement Needed   ADL Patient Response Patient;Acceptance;Explanation;Demonstration;Verbal Demonstration;Reinforcement Needed   Anticipated Discharge Equipment and Recommendations   DC Equipment Recommendations Unable to determine at this time   Discharge Recommendations Recommend post-acute placement for additional occupational therapy services prior to discharge home   Interdisciplinary Plan of Care Collaboration   IDT Collaboration with  Nursing;Certified Nursing Assistant   Patient Position at End of Therapy Seated;Chair Alarm On;Call Light within Reach;Tray Table within Reach;Phone within Reach   Collaboration Comments report given   Session Information   Date / Session Number  7/8,10, 2/2,7/13)   Priority 2

## 2021-07-08 NOTE — DISCHARGE PLANNING
Anticipated Discharge Disposition:   SNF    Action:   This RN CM is following the case. Per ELISA Paredes, Pt is not a good candidate for OP as Pt cannot take care of himself.  Per RN notes yesterady7/7/21 , Pt is A&O x 4, and is max assist and had a fall too from attempting to stand up by himself.     Per ELISA Paredes today, Pt is a 2 person assist with a FWW.  Pt has a MLI with a regular wound vac.     Per rounds today, PT and OT will be asked for new eval/notes.  Informed Dr Lazo that we need an order/referral for [facility] if Pt is able.  Spoke with Blanka,   at [facility], awaiting appointment.     The DON at [facility] is reviewing the case. Andrey MOTTA left a VM twice today.    Informed LUNA [employer] Manager of this update.   Dr Ledesma talked to Pt , we will try Pt getting accepted at [facility].     Dr Ledesma spoke with Sathish, Admissions at [facility] today . Since there is no male bed today, Choice was sent to the rests of the [facility]. Choice was faxed to Andrey Rodrigues of [facility] to talk to Admissions with [facility].       13:00 Per Andrey MOTTA, [facility] might be able to accept Pt tomorrow pending wound/wound vac review.     Barriers to Discharge:   Pending SNF acceptance    Plan:   This RN CM will continue to assist Pt with discharge as needed

## 2021-07-08 NOTE — CARE PLAN
The patient is Stable - Low risk of patient condition declining or worsening    Shift Goals  Clinical Goals: prevent skin breakdown, inform staff of BM, no falls, use call light  Patient Goals: rest, comfort  Family Goals: n/a    Progress made toward(s) clinical / shift goals:  Pt was educated on fall risk and need to call nursing staff prior to mobilization. Pt verbalizes understanding. Pt is A&O x 4. Bed alarm is in place and on. Hourly rounding in place. Proper fall precautions in place. Call light within reach and encouraged to use. Pt was reminded to inform staff when having an incontinent episode in order to clean pt immediately. Educated on the risk of skin breakdown if not properly and timely cleaned. Barrier cream and wipes used.    Patient is not progressing towards the following goals:

## 2021-07-08 NOTE — PROGRESS NOTES
"Pt yelled help. 2 RNs responded to find the pt laying on his right side on the floor at the foot of the bed.  The low air loss bed had all 4 rails up and was in an elevated position.  Call light was not on.  Pt stated he \"wanted to see what he could do on his own, but he slipped.\"  PIV site was bleeding, cleaned up and applied gauze. Alerted charge RN who came to assist. Ordered larry sling. Gown changed.  Used larry to assist pt back to bed.  Cleaned incontinence.  Applied a bed alarm.  Call light and belongings in reach.  Primary RN and MD notified- orders placed.   "

## 2021-07-08 NOTE — CARE PLAN
Problem: Psychosocial  Goal: Patient's level of anxiety will decrease  Flowsheets (Taken 7/8/2021 1042)  Decrease Anxiety Level:   Collaborated with patient to identify and develop coping strategies   Implemented stimuli reduction, calming techniques   Pharmacologic management per MD order     Problem: Pain - Standard  Goal: Alleviation of pain or a reduction in pain to the patient’s comfort goal  7/8/2021 1042 by Lena Nath, R.N.  Outcome: Progressing  7/8/2021 1041 by Lena Nath, R.N.  Outcome: Progressing     Problem: Knowledge Deficit - Standard  Goal: Patient and family/care givers will demonstrate understanding of plan of care, disease process/condition, diagnostic tests and medications  Outcome: Progressing   The patient is Stable - Low risk of patient condition declining or worsening    Shift Goals  Clinical Goals: mobilize, prevent further skin breakdown, reduce anxiety  Patient Goals: rest, comfort  Family Goals: n/a    Progress made toward(s) clinical / shift goals:  discussed distraction and relaxation techniques for anxiety, pt agreeable to mobility today.  Q2h turns, barrier cream and frequent incontinence changes in place    Patient is not progressing towards the following goals:      Problem: Skin Integrity  Goal: Skin integrity is maintained or improved  7/8/2021 1042 by Lena Nath, R.N.  Outcome: Not Progressing  7/8/2021 1041 by Lena Nath, R.N.  Outcome: Not Progressing

## 2021-07-08 NOTE — PROGRESS NOTES
"Bedside report received.  Assessment complete.  A&O x 4. Patient calls appropriately. VSS on RA.  Patient is a max assist.   Pt reports no pain at this time. Discussed PRN medications if needed.  Denies N&V. Tolerating GI soft diet with 1500 mL fluid restriction.  MLI with wound vac in place, CDI.  + void, + flatus, last BM 07/06. Incontinent.  Patient denies SOB.  SCD's on.  Excoriation to inner thighs and buttocks. Barrier cream and barrier wipes used.  High fall risk. Bed alarm on. Pt had a fall during day shift today (07/07). Spent 20 minutes talking with pt about the importance of calling before trying to get up. He stated \"I haven't gotten up by myself in awhile so I wanted to see how I would do\". Pt expressed that he attempted to wait for help but knew everyone was busy.   Pt was educated on fall risk and need to call nursing staff prior to mobilization. Pt verbalizes understanding.  Review plan with of care with patient. Call light and personal belongings with in reach. Hourly rounding in place. All needs met at this time.  "

## 2021-07-08 NOTE — CARE PLAN
Problem: Knowledge Deficit - Standard  Goal: Patient and family/care givers will demonstrate understanding of plan of care, disease process/condition, diagnostic tests and medications  Outcome: Not Progressing     Problem: Skin Integrity  Goal: Skin integrity is maintained or improved  Outcome: Not Progressing     Problem: Pain - Standard  Goal: Alleviation of pain or a reduction in pain to the patient’s comfort goal  Outcome: Progressing   The patient is Stable - Low risk of patient condition declining or worsening    Shift Goals  Clinical Goals: prevent skin breakdown, safety  Patient Goals: rest and comfor  Family Goals: n/a    Progress made toward(s) clinical / shift goals:      Patient is not progressing towards the following goals:      Problem: Knowledge Deficit - Standard  Goal: Patient and family/care givers will demonstrate understanding of plan of care, disease process/condition, diagnostic tests and medications  Outcome: Not Progressing     Problem: Skin Integrity  Goal: Skin integrity is maintained or improved  Outcome: Not Progressing

## 2021-07-09 VITALS
TEMPERATURE: 97.8 F | WEIGHT: 272.27 LBS | HEART RATE: 91 BPM | DIASTOLIC BLOOD PRESSURE: 54 MMHG | BODY MASS INDEX: 36.88 KG/M2 | RESPIRATION RATE: 18 BRPM | SYSTOLIC BLOOD PRESSURE: 141 MMHG | OXYGEN SATURATION: 90 % | HEIGHT: 72 IN

## 2021-07-09 LAB
ANION GAP SERPL CALC-SCNC: 9 MMOL/L (ref 7–16)
BACTERIA STL CULT: NORMAL
BUN SERPL-MCNC: 16 MG/DL (ref 8–22)
C JEJUNI+C COLI AG STL QL: NORMAL
CALCIUM SERPL-MCNC: 8.9 MG/DL (ref 8.5–10.5)
CHLORIDE SERPL-SCNC: 102 MMOL/L (ref 96–112)
CO2 SERPL-SCNC: 28 MMOL/L (ref 20–33)
CREAT SERPL-MCNC: 1.07 MG/DL (ref 0.5–1.4)
E COLI SXT1+2 STL IA: NORMAL
GLUCOSE SERPL-MCNC: 128 MG/DL (ref 65–99)
MAGNESIUM SERPL-MCNC: 1.8 MG/DL (ref 1.5–2.5)
POTASSIUM SERPL-SCNC: 3.9 MMOL/L (ref 3.6–5.5)
SIGNIFICANT IND 70042: NORMAL
SITE SITE: NORMAL
SODIUM SERPL-SCNC: 139 MMOL/L (ref 135–145)
SOURCE SOURCE: NORMAL

## 2021-07-09 PROCEDURE — 302098 PASTE RING (FLAT): Performed by: FAMILY MEDICINE

## 2021-07-09 PROCEDURE — 700102 HCHG RX REV CODE 250 W/ 637 OVERRIDE(OP): Performed by: INTERNAL MEDICINE

## 2021-07-09 PROCEDURE — A9270 NON-COVERED ITEM OR SERVICE: HCPCS | Performed by: INTERNAL MEDICINE

## 2021-07-09 PROCEDURE — A9270 NON-COVERED ITEM OR SERVICE: HCPCS | Performed by: FAMILY MEDICINE

## 2021-07-09 PROCEDURE — 700102 HCHG RX REV CODE 250 W/ 637 OVERRIDE(OP): Performed by: STUDENT IN AN ORGANIZED HEALTH CARE EDUCATION/TRAINING PROGRAM

## 2021-07-09 PROCEDURE — 36415 COLL VENOUS BLD VENIPUNCTURE: CPT

## 2021-07-09 PROCEDURE — 83735 ASSAY OF MAGNESIUM: CPT

## 2021-07-09 PROCEDURE — 99239 HOSP IP/OBS DSCHRG MGMT >30: CPT | Performed by: FAMILY MEDICINE

## 2021-07-09 PROCEDURE — 97530 THERAPEUTIC ACTIVITIES: CPT

## 2021-07-09 PROCEDURE — 80048 BASIC METABOLIC PNL TOTAL CA: CPT

## 2021-07-09 PROCEDURE — 97116 GAIT TRAINING THERAPY: CPT

## 2021-07-09 PROCEDURE — 97606 NEG PRS WND THER DME>50 SQCM: CPT

## 2021-07-09 PROCEDURE — A9270 NON-COVERED ITEM OR SERVICE: HCPCS | Performed by: STUDENT IN AN ORGANIZED HEALTH CARE EDUCATION/TRAINING PROGRAM

## 2021-07-09 PROCEDURE — 700102 HCHG RX REV CODE 250 W/ 637 OVERRIDE(OP): Performed by: FAMILY MEDICINE

## 2021-07-09 RX ORDER — CLONAZEPAM 0.5 MG/1
TABLET ORAL
Qty: 9 TABLET | Refills: 0 | Status: SHIPPED | OUTPATIENT
Start: 2021-07-09 | End: 2021-07-12

## 2021-07-09 RX ORDER — OXYCODONE HYDROCHLORIDE 10 MG/1
10 TABLET ORAL EVERY 6 HOURS PRN
Qty: 12 TABLET | Refills: 0 | Status: SHIPPED | OUTPATIENT
Start: 2021-07-09 | End: 2021-07-12

## 2021-07-09 RX ADMIN — CLONAZEPAM 0.5 MG: 0.5 TABLET ORAL at 08:00

## 2021-07-09 RX ADMIN — LEVOTHYROXINE SODIUM 150 MCG: 0.03 TABLET ORAL at 05:48

## 2021-07-09 RX ADMIN — METOPROLOL TARTRATE 25 MG: 25 TABLET, FILM COATED ORAL at 05:47

## 2021-07-09 RX ADMIN — HYDROCORTISONE 5 MG: 5 TABLET ORAL at 05:48

## 2021-07-09 RX ADMIN — ZIPRASIDONE HYDROCHLORIDE 60 MG: 60 CAPSULE ORAL at 08:00

## 2021-07-09 RX ADMIN — Medication 1000 UNITS: at 05:47

## 2021-07-09 RX ADMIN — APIXABAN 5 MG: 5 TABLET, FILM COATED ORAL at 05:49

## 2021-07-09 RX ADMIN — TAMSULOSIN HYDROCHLORIDE 0.4 MG: 0.4 CAPSULE ORAL at 08:00

## 2021-07-09 RX ADMIN — OMEPRAZOLE 40 MG: 20 CAPSULE, DELAYED RELEASE ORAL at 05:48

## 2021-07-09 RX ADMIN — PAROXETINE HYDROCHLORIDE 20 MG: 20 TABLET, FILM COATED ORAL at 08:02

## 2021-07-09 RX ADMIN — AMIODARONE HYDROCHLORIDE 200 MG: 200 TABLET ORAL at 05:48

## 2021-07-09 RX ADMIN — CALCIUM POLYCARBOPHIL 625 MG: 625 TABLET, FILM COATED ORAL at 08:00

## 2021-07-09 ASSESSMENT — PAIN DESCRIPTION - PAIN TYPE
TYPE: ACUTE PAIN
TYPE: ACUTE PAIN

## 2021-07-09 ASSESSMENT — GAIT ASSESSMENTS
GAIT LEVEL OF ASSIST: SUPERVISED
DISTANCE (FEET): 40
ASSISTIVE DEVICE: 4 WHEEL WALKER
DEVIATION: BRADYKINETIC

## 2021-07-09 ASSESSMENT — COGNITIVE AND FUNCTIONAL STATUS - GENERAL
SUGGESTED CMS G CODE MODIFIER MOBILITY: CL
MOVING FROM LYING ON BACK TO SITTING ON SIDE OF FLAT BED: A LOT
CLIMB 3 TO 5 STEPS WITH RAILING: TOTAL
WALKING IN HOSPITAL ROOM: A LOT
TURNING FROM BACK TO SIDE WHILE IN FLAT BAD: A LOT
MOVING TO AND FROM BED TO CHAIR: A LOT
MOBILITY SCORE: 11
STANDING UP FROM CHAIR USING ARMS: A LOT

## 2021-07-09 NOTE — PROGRESS NOTES
"RN noticed a hole in pt wound vac seal. RN asked pt if he knew how it had happened. Pt stated he \"had no idea how the hole got there\". RN asked pt if he may have hit it against something in the bed and pt denied knowing what happened. RN placed drape over the hole.       "

## 2021-07-09 NOTE — DISCHARGE PLANNING
Received Transport Form @ 0487  Spoke to Sue @ [facility]. Reservation ID: [id number].  Spoke to Dianne @ [facility].    Transport is scheduled for 7/9 @1300 going to [facility].    BS RN, RN CM, and DPA notified of scheduled transport via Voalte @1012.

## 2021-07-09 NOTE — DISCHARGE PLANNING
Care Transition Team Discharge Planning    Anticipates discharge disposition:  • SNF    Action:  • This RN CM Is following the case. Pt has been accepted at [facility] at 1300. Dr Lazo and ELISA Paredes informed .  • This RN CM faxed [facility], [facility] and Face Sheet to [facility]. Sent a message to [facility] Ho via voalte.  • This RN CM prepared Cobra/tranfer packet  Cobra was signed by Dr Lazo and obtained verbal consent from Pt.     Per Suki Edgar of [facility]. Pt's [facility] transport is all set at 13:00 to [facility] today.     Barriers to Discharge:  • None    Plan:  This RN CM will continue to assist Pt with discharge as needed

## 2021-07-09 NOTE — PROGRESS NOTES
2 RN skin check complete.   Devices in place . RUE midline, remote tele, wound vac  Skin assessed under devices . n/a    Midline wound vac  LLQ scabbed. Old ostomy site  Redness to pannus, groin, scrotum.   B/L LE swelling, flaky, calloused  Missing rt great toe  Rt abd with open blisters  Sacrum redness. blanching     normal

## 2021-07-09 NOTE — WOUND TEAM
[facility]  Inpatient Services  Wound and Skin Care Progress Note    Admission Date: 2/9/2021     Last order of IP CONSULT TO WOUND CARE was found on 3/18/2021 from Hospital Encounter on 2/9/2021     HPI, PMH, SH: Reviewed    Past Surgical History:   Procedure Laterality Date   • PB EXPLORATORY OF ABDOMEN  5/27/2021    Procedure: LAPAROTOMY, EXPLORATORY;  Surgeon: Kin Desouza D.O.;  Location: [facility];  Service: General   • COLOSTOMY TAKEDOWN  5/27/2021    Procedure: COLOSTOMY TAKEDOWN AND CLOSURE;  Surgeon: Kin Desouza D.O.;  Location: SUR[facility]ERY [facility];  Service: General   • COLECTOMY N/A 11/10/2020    Procedure: COLECTOMY-SEGMENTAL, COLOSTOMY, MOBILIZATION OF SPLENIC FLEXURE, WOUND VAC PLACEMENT, IRRIGATION AND DEBRIDMENT FLANK WOUND;  Surgeon: Kin Desouza D.O.;  Location: [facility];  Service: General   • ZZZ CARDIAC CATH  07/21/2018    EF 45%, normal coronaries.   • IRRIGATION & DEBRIDEMENT ORTHO Right 2/19/2018    Procedure: IRRIGATION & DEBRIDEMENT ORTHO-FOOT;  Surgeon: Kirby Lopez M.D.;  Location: [facility];  Service: Orthopedics   • TOE AMPUTATION Right 1/17/2018    Procedure: TOE AMPUTATION-1ST RAY;  Surgeon: Doug Delong M.D.;  Location: [facility];  Service: Orthopedics   • TOE AMPUTATION Right 11/10/2017    Procedure: TOE AMPUTATION;  Surgeon: Osorio Leo M.D.;  Location: [facility];  Service: Orthopedics     Social History     Tobacco Use   • Smoking status: Never Smoker   • Smokeless tobacco: Never Used   Substance Use Topics   • Alcohol use: No     Chief Complaint   Patient presents with   • Syncope     X1 this morning when standing up to go to bathroom     Diagnosis: Syncope and collapse [R55]  Suicidal ideation [R45.851]  Sepsis due to Klebsiella pneumoniae (HCC) [A41.4]    Unit where seen by Wound Team: T418/00     WOUND CONSULT/FOLLOW UP RELATED TO: abdominal wound    WOUND HISTORY:  Pt is an older  gentleman well known to [facility] wound team 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 Pt currently admitted since 2/9/21 related to SI concerns due to body image issues related to colostomy.      *Pt underwent surgical reversal of colostomy with Dr. Desouza on 5/20/21. MLI was re-opened at that time and was then closed with interrupted staples and packing. Unfortunately MLI continued to Dehisce and therefore wound team was consulted to further manage MLI with dakins packing.     6/3 Wound vac placed    WOUND ASSESSMENT/LDA      Negative Pressure Wound Therapy 06/03/21 Abdomen (Active)   Vacuum Serial Number [id number] 06/26/21 2054   NPWT Pump Mode / Pressure Setting Ulta;Continuous;125 mmHg    Dressing Type Medium;Black Foam (Regular)    Number of Foam Pieces Used 2    Canister Changed No    Output (mL) 0 mL    NEXT Dressing Change/Treatment Date 07/12/21    VAC VeraFlo Irrigant Other (Comments)    VAC VeraFlo Soak Time (mins) 0    VAC VeraFlo Instill Volume (ml) 0    VAC VeraFlo - Therapy Time (hrs) 0    VAC VeraFlo Pressure (mm/Hg) Continuous;125 mmHg    WOUND NURSE ONLY - Time Spent with Patient (mins) 45      Wound 05/27/21 Full Thickness Wound Abdomen (Active)   Wound Image     07/07/21 0900   Site Assessment Red;Granulation tissue    Periwound Assessment Clean;Dry;Intact;Scar tissue    Margins Attached edges;Defined edges    Closure Secondary intention    Drainage Amount Scant    Drainage Description Serosanguineous    Treatments Cleansed;Site care    Wound Cleansing Approved Wound Cleanser    Periwound Protectant Skin Protectant Wipes to Periwound    Dressing Cleansing/Solutions Not Applicable    Dressing Options Wound Vac    Dressing Changed Changed    Dressing Status Clean;Dry;Intact    Dressing Change/Treatment Frequency Monday, Wednesday, Friday, and As Needed    NEXT Dressing Change/Treatment Date 07/12/21    NEXT Weekly Photo (Inpatient Only) 07/14/21    Non-staged Wound Description Full  thickness    Wound Length (cm) 12.4 cm    Wound Width (cm) 4 cm    Wound Depth (cm) 1.4 cm    Wound Surface Area (cm^2) 49.6 cm^2    Wound Volume (cm^3) 69.44 cm^3    Wound Healing % 15    Tunneling (cm) 0 cm    Tunneling Clock Position of Wound 12    Tunneling - 2nd Location (cm) 0 cm    Tunneling Clock Position of Wound - 2nd Location 6    Undermining (cm) 0 cm    Undermining of Wound, 1st Location From 1 o'clock;To 2 o'clock    Shape linear    Wound Odor None    Pulses N/A    Exposed Structures Fascia    WOUND NURSE ONLY - Time Spent with Patient (mins) 60      Vascular:    SHAYAN:   No results found.    Lab Values:    Lab Results   Component Value Date/Time    WBC 10.2 2021 01:11 PM    RBC 3.89 (L) 2021 01:11 PM    HEMOGLOBIN 11.1 (L) 2021 01:11 PM    HEMATOCRIT 35.0 (L) 2021 01:11 PM    CREACTPROT 0.48 06/10/2021 04:05 AM    SEDRATEWES 13 10/23/2018 08:02 AM    HBA1C 5.2 2021 01:30 AM      Culture Results show:  No results found for this or any previous visit (from the past 720 hour(s)).    Pain Level/Medicated:  Medicated with IV Dilaudid      INTERVENTIONS BY WOUND TEAM:  Chart and images reviewed. Discussed with bedside RN. All areas of concern (based on picture review, LDA review and discussion with bedside RN) have been thoroughly assessed. Documentation of areas based on significant findings. This RN in to assess patient. Performed standard wound care which includes appropriate positioning, dressing removal and non-selective debridement. Pictures and measurements obtained weekly if/when required.              ABD:   Preparation for Dressing removal:  Removed without difficulty.    Cleansed with: Wound cleanser and gauze   Sharp debridement: NA  Joya wound: Cleansed with Wound cleanser and gauze, No sting skin and strips of drape.    Primary Dressin piece of full thickness spiraled black foam applied to wound bed. Edges pulled together and secured with drape.   Secondary  (Outer) Dressing:  A hole was cut in the drape and a 2nd piece of half thickness circular black foam was applied as a button for trac pad. Trac pad applied.    Interdisciplinary consultation: Patient, Bedside RN (Lena)    EVALUATION / RATIONALE FOR TREATMENT:  Most Recent Date:  7/9/21: Wound continues to progress. Continued with NPWT. Pt to DC to SNF today.     07/07/21: wound continues to progress.  Was going to transfer to [facility] yesterday but patient became suicidal again and had 1:1 sitter overnight.     7/2/21: Wound bed clean and red with granular tissue. Continue NPWT.   6/30/21: Wound continues to progress with significantly less slough and wound is decreasing in size. Continued with NPWT.  6/28/21: Wound continues to have granular tissue. Pt heading to CT scan today. Wound has decreased in width significantly since dc of veraflo. Continued with NPWT.  06/25/21: wound continues to have granulation tissue.  Left abd with some protuding, Dr. natarajan updated and assessed.     6/23/21: wound continues to progress, transitioned to regular vac and continued to pull edges together.   6/21/21: Edges pulled together with jones by Dr. Rooney during change on Friday. Discussed with Lyly today. MD unable to be bedside today. Will continue with veraflo hopefully will be able to transition to regular vac at next visit to further assist with edges being pulled together. Meet with Dr. Rooney on Wednesday at 8am to assess wound.  6/18/21: continuing to decrease in slough in wound bed. One suture towards proximal end visible, per MD rooney, leave in place but monitor. Staples added to superior and inferior ends to assist with closure by MD. Continuing to pull edges together, MD will reassess on Monday with wound team to monitor for improvement and decide if surgical I&D is needed or if appropriate to switch to regular NPWT dressing instead of VF.   6/16/21: Wound with decreased slough. Wound appears to be  "\"flattening out.\" Veraflo is assisting with cleansing wound but is making pulling edges together difficult. Discussed with Dr. Desouza. Will plan to Meet with Dr. Desouza on Friday at 8am to assess wound, Dr. Desouza to pull superior and inferior borders together and staple to assist with closure.  6/14/21: Wound continues to granulate. Small amount of slough over fascial line. Discussed with Dr. Desouza. Plan to continue with veraflo, settings decreased. Will evaluate the wound with Dr. Desouza on Wednesday at 9am. Per MD may consider OR washout with staples to distal proximal aspect to assist in wound closure vs. Adding staples at bedside.   06/11/21:  Wound looks good, minimal slough mainly to fascial line sutures.  VF grey foam layer to assist in debridement of slough.  Dr. Desouza to re-assess Monday and do surgical debridement if needed next week.   06/09/21:  Wound continues to progress, pull edges together to lessen width of wound. Dr. Desouza would like to see wound on Friday 6/7: Pt's wound appears to be improving per measurements and pictures. There is granulation tissue present.   06/5/21: Wound bed improving with increased granulation tissue.  Wound edges approximated prior to drape application.  NPWT continued.    06/03/21:  Fascial sutures intact, wound VAC placed with DR. Desouza.    6/1/21: Wound appears  than yesterdays pictures. Discussed with Dr. Desouza, plan to continue dakins TID for 48hrs, CT scan ordered today. Will re-evaluate on Thursday for potential vac placement.   5/31/21: Wound initially with interrupted staples and packing now with dehiscence. Per Dr. Baeza request wound team initiated dakins packing. Pt may benefit from NPWT to assist with closure.   5/8/21: wound nearly healed. Does not require advanced wound care. Wound team signing off and nursing to re consult if site worsens. Continue with dressing care per order.  4/21/21: abd wound improving. Nursing to continue dsg " changes per order.  4/15: Right posterior thigh DTI now resolved. Pink and blanching. Abdominal wound to surface level continuing with dressing POC.  4/7/21: Thigh wound nearly healed and will likely be resolved at next assessment. Abdomen wound appears larger. Continued with Arti to move through inflammatory phase. Hydrofera blue for its bacteriostatic properties and to assist in drainage management.   03/31/21: thigh wound healing well now to a stage 2.  Continue current plan.  All interventions in place.   ABD more denuded and bloody.  Arti to assist in moving wound through inflammatory phase.   3/23/21: Right posterior thigh wound remains a DTI but appears to be evolving into a stage 2. Wound team was previously consulted regarding sacral wound which appeared to be a skin tear however skin now appears to be intact however discolored. Does not appear to be pressure related as pt is on a waffle, self mobile and sacral mepilex is in use. Area is also not a normal pressure injury shape. Wound team will continue to follow.   3/18/21: Pt with linear DTIs to R posterior thigh from lying on top of frye cath tubing. Offloading precautions ordered. Wound team to continue following.     Goals: Steady decrease in wound area and depth weekly.    WOUND TEAM PLAN OF CARE ([X] for frequency of wound follow up,):   Nursing to follow orders written for wound care. Contact wound team if area fails to progress, deteriorates or with any questions/concerns  Dressing changes by wound team:                   Follow up 3 times weekly:                NPWT change 3 times weekly:   X  Follow up 1-2 times weekly:     Follow up Bi-Monthly:                   Follow up as needed:   Other (explain):     NURSING PLAN OF CARE ORDERS (X):  Dressing changes: See Dressing Care orders: X  Skin care: See Skin Care orders: x  RN Prevention Protocol: x  Rectal tube care: See Rectal Tube Care orders:   Other orders:     Anticipated discharge plans:    LTACH:        SNF/Rehab:                Group home vs SNF  Home Health Care:           Outpatient Wound Center:            Self/Family Care:        Other:

## 2021-07-09 NOTE — PROGRESS NOTES
Pt is A&O x4  Denies pain, nausea, and SOB   Tolerating a GI soft diet, fluid restriction in place  Wound vac in place to mid abdomen, no signs of air leak  + Voids  - BM (Last BM 7/8)  Up x2 assist with FWW  Bed alarm is on, pt is high fall risk per juli chávez  Reviewed plan of care with patient, bed in lowest position and locked, pt resting comfortably now, call light within reach, all needs met at this time. Interventions will be executed per plan of care

## 2021-07-09 NOTE — PROGRESS NOTES
Discharging Patient to [facility] per physician order.  Discharged with [facility].  Demonstrated understanding of discharge instructions, follow up appointments, home medications, and care for surgical wound.  Ambulating with 2 assist and FWW, voiding with incontinence, pain well controlled, tolerating oral medications, oxygen saturation greater than 90%, tolerating diet.  Educational handouts given and discussed.  Verbalized understanding of discharge instructions and educational handouts.  All questions answered.  Belongings with patient at time of discharge.

## 2021-07-09 NOTE — CARE PLAN
The patient is Stable - Low risk of patient condition declining or worsening    Shift Goals  Clinical Goals: Remain free from falls/injury     Progress made toward(s) clinical / shift goals:    Pt has the following fall precautions in place: Pt is in room close to nurse's station, bed alarm is on, pt reminded about the importance of using the call light when in need of assistance. Pt agreeable.   Problem: Knowledge Deficit - Standard  Goal: Patient and family/care givers will demonstrate understanding of plan of care, disease process/condition, diagnostic tests and medications  Outcome: Progressing     Problem: Skin Integrity  Goal: Skin integrity is maintained or improved  Outcome: Progressing     Problem: Psychosocial  Goal: Patient's level of anxiety will decrease  Outcome: Progressing

## 2021-07-10 NOTE — THERAPY
Physical Therapy   Daily Treatment     Patient Name: Sebastián Castro  Age:  56 y.o., Sex:  male  Medical Record #: [id number]  Today's Date: 7/9/2021     Precautions: Fall Risk    Assessment    The pt presents today motivated to participate in therapy.  He was able to perform bed mobility spv w/ HOB elevated, STS John from EOB w/ 4WW, and ambulate about 40' using 4WW SBA being limited by fatigue.  The pt performed repetitions of STS EOB gradually decreasing the height of the bed w/ success at the lowest position Jonh.  Continue to recommend placement given pt's limited ability to care for self.  Will follow.      Plan    Continue current treatment plan.    DC Equipment Recommendations: None (owns 4WW)  Discharge Recommendations: Recommend post-acute placement for additional physical therapy services prior to discharge home      Subjective/Objective       07/09/21 1118   Cognition    Cognition / Consciousness WDL   Level of Consciousness Alert   Comments Pt pleasant and cooperative   Active ROM Lower Body    Active ROM Lower Body  WDL   Strength Lower Body   Lower Body Strength  WDL   Comments observed during functional mobility   Sensation Lower Body   Lower Extremity Sensation   WDL   Comments reports no numbness/tingling in LE's   Sitting Lower Body Exercises   Sitting Lower Body Exercises Yes   Ankle Pumps 1 set of 10;Bilateral   Long Arc Quad 1 set of 10;Bilateral   Sit to Stand 1 set of 10  (EOB)   Neuro-Muscular Treatments   Neuro-Muscular Treatments Anterior weight shift;Weight Shift Right;Weight Shift Left   Comments w/ FWW   Balance   Sitting Balance (Static) Fair   Sitting Balance (Dynamic) Fair   Standing Balance (Static) Fair -   Standing Balance (Dynamic) Fair -   Weight Shift Sitting Fair   Weight Shift Standing Fair   Skilled Intervention Verbal Cuing;Tactile Cuing;Facilitation;Sequencing   Comments w/ FWW   Gait Analysis   Gait Level Of Assist Supervised  (SBA)   Assistive Device 4 Wheel Walker   Distance  (Feet) 40   # of Times Distance was Traveled 1   Deviation Bradykinetic  (reduced step length)   Weight Bearing Status FWB BLE   Skilled Intervention Verbal Cuing;Tactile Cuing   Bed Mobility    Supine to Sit Supervised   Sit to Supine Supervised   Scooting Supervised   Rolling Supervised   Skilled Intervention Verbal Cuing;Tactile Cuing;Facilitation;Sequencing   Comments HOB elevated   Functional Mobility   Sit to Stand Minimal Assist   Mobility EOB->hallway->bed   Comments w/ 4WW   Activity Tolerance   Sitting Edge of Bed 20min   Standing 10min   Patient / Family Goals    Patient / Family Goal #1 walk again   Goal #1 Outcome Goal met   Short Term Goals    Short Term Goal # 1 Pt will perform supine<->sit with supv within 6 visits in order to return home   Goal Outcome # 1 Goal met  (HOB raised)   Short Term Goal # 2 Pt will transfer bed<->chair with 4WW with supv within 6 visits in order to return home   Goal Outcome # 2 Other (see comments)  (not assessed)   Short Term Goal # 3 Pt will ambulate 100' with supv within 6 visits in order to return home   Goal Outcome # 3 Goal not met   Short Term Goal # 4 Pt will ascend/descend 5 steps with supv within 6 visits in order to enter/exit home   Goal Outcome # 4 Goal not met   Education Group   Education Provided Role of Physical Therapist;Exercises - Seated   Role of Physical Therapist Patient Response Patient;Acceptance;Explanation;Verbal Demonstration   Exercises - Seated Patient Response Patient;Acceptance;Explanation;Demonstration;Verbal Demonstration;Action Demonstration   Additional Comments pt receptive of edu provided

## 2021-07-27 ENCOUNTER — OFFICE VISIT (OUTPATIENT)
Dept: CARDIOLOGY | Facility: MEDICAL CENTER | Age: 56
End: 2021-07-27
Payer: MEDICARE

## 2021-07-27 VITALS
SYSTOLIC BLOOD PRESSURE: 128 MMHG | OXYGEN SATURATION: 94 % | HEIGHT: 72 IN | BODY MASS INDEX: 35.35 KG/M2 | HEART RATE: 72 BPM | DIASTOLIC BLOOD PRESSURE: 82 MMHG | WEIGHT: 261 LBS

## 2021-07-27 DIAGNOSIS — I48.11 LONGSTANDING PERSISTENT ATRIAL FIBRILLATION (HCC): ICD-10-CM

## 2021-07-27 DIAGNOSIS — I42.8 NON-ISCHEMIC CARDIOMYOPATHY (HCC): ICD-10-CM

## 2021-07-27 PROCEDURE — 99214 OFFICE O/P EST MOD 30 MIN: CPT | Performed by: INTERNAL MEDICINE

## 2021-07-27 PROCEDURE — 93000 ELECTROCARDIOGRAM COMPLETE: CPT | Performed by: INTERNAL MEDICINE

## 2021-07-27 RX ORDER — OXYCODONE HCL 10 MG/1
10 TABLET, FILM COATED, EXTENDED RELEASE ORAL EVERY 12 HOURS
COMMUNITY
End: 2022-11-05

## 2021-07-27 RX ORDER — CLONAZEPAM 0.5 MG/1
TABLET ORAL 2 TIMES DAILY
COMMUNITY

## 2021-07-27 ASSESSMENT — FIBROSIS 4 INDEX: FIB4 SCORE: 0.77

## 2021-07-27 ASSESSMENT — ENCOUNTER SYMPTOMS
NAUSEA: 0
WEAKNESS: 1
PND: 0
ABDOMINAL PAIN: 0
SPEECH CHANGE: 0
HALLUCINATIONS: 0
COUGH: 0
CLAUDICATION: 0
EYE PAIN: 0
BRUISES/BLEEDS EASILY: 0
VOMITING: 0
MYALGIAS: 0
DEPRESSION: 0
BLOOD IN STOOL: 0
WEIGHT LOSS: 0
FEVER: 0
ORTHOPNEA: 0
SHORTNESS OF BREATH: 0
PALPITATIONS: 0
DOUBLE VISION: 0
DIZZINESS: 0
LOSS OF CONSCIOUSNESS: 0
HEADACHES: 0
BLURRED VISION: 0
SENSORY CHANGE: 0
FALLS: 0
CHILLS: 0
EYE DISCHARGE: 0

## 2021-07-27 ASSESSMENT — MINNESOTA LIVING WITH HEART FAILURE QUESTIONNAIRE (MLHF)
FEELING LIKE A BURDEN TO FAMILY AND FRIENDS: 5
MAKING YOU FEEL DEPRESSED: 5
DIFFICULTY TO CONCENTRATE OR REMEMBERING THINGS: 5
HAVING TO SIT OR LIE DOWN DURING THE DAY: 4
DIFFICULTY WORKING TO EARN A LIVING: 5
TIRED, FATIGUED OR LOW ON ENERGY: 4
DIFFICULTY WITH RECREATIONAL PASTIMES, SPORTS, HOBBIES: 4
MAKING YOU SHORT OF BREATH: 4
COSTING YOU MONEY FOR MEDICAL CARE: 3
WALKING ABOUT OR CLIMBING STAIRS DIFFICULT: 4
GIVING YOU SIDE EFFECTS FROM TREATMENTS: 3
MAKING YOU STAY IN A HOSPITAL: 4
TOTAL_SCORE: 88
MAKING YOU WORRY: 5
SWELLING IN ANKLES OR LEGS: 3
DIFFICULTY WITH SEXUAL ACTIVITIES: 5
EATING LESS FOODS YOU LIKE: 4
LOSS OF SELF CONTROL IN YOUR LIFE: 5
DIFFICULTY SLEEPING WELL AT NIGHT: 5
WORKING AROUND THE HOUSE OR YARD DIFFICULT: 4
DIFFICULTY GOING AWAY FROM HOME: 4
DIFFICULTY SOCIALIZING WITH FAMILY OR FRIENDS: 3

## 2021-07-27 NOTE — PROGRESS NOTES
Chief Complaint   Patient presents with   • Atrial Fibrillation   • Congestive Heart Failure   • Hyperlipidemia       Subjective:   Sebastián Castro is a 56 y.o. male who presents today for cardiac care and management of persistent atrial fibrillation, prior history of nonischemic cardiomyopathy, recent hospitalization due to bacteremia along with bradycardia.    I have independently interpreted and reviewed echocardiogram's actual images with patient which showed normal left ventricular systolic function. No wall motion abnormality. No evidence of pulmonary hypertension. No significant valvular disease.    I have personally interpreted EKG today with patient, there is no evidence of acute coronary syndrome, no evidence of prior infarct, normal CA and QT interval, no significant conduction disease. Atrial fibrillation with controlled rate.    Patient is currently living in a nursing home facility.    He is not very mobile because of weakness in his legs.  He is try to get stronger physically.    Past Medical History:   Diagnosis Date   • A-fib ([facility])    • Abscess 11/8/2020   • Bacteremia 11/8/2020   • Chest pain    • Congestive heart failure ([facility])    • Diabetes ([facility])    • Diabetic neuropathy ([facility])    • Hypercholesterolemia    • Hypertension    • Longstanding persistent atrial fibrillation ([facility]) 11/8/2020   • LV dysfunction 11/8/2020   • Morbid obesity ([facility])    • NICM (nonischemic cardiomyopathy) ([facility]) 11/8/2020   • Noncompliance    • Normal cardiac stress test    • Orthostatic hypotension    • Sepsis ([facility]) 11/8/2020     Past Surgical History:   Procedure Laterality Date   • PB EXPLORATORY OF ABDOMEN  5/27/2021    Procedure: LAPAROTOMY, EXPLORATORY;  Surgeon: Kin Desouza D.O.;  Location: [facility];  Service: General   • COLOSTOMY TAKEDOWN  5/27/2021    Procedure: COLOSTOMY TAKEDOWN AND CLOSURE;  Surgeon: Kin Desouza D.O.;  Location: [facility];  Service: General   • COLECTOMY N/A 11/10/2020     Procedure: COLECTOMY-SEGMENTAL, COLOSTOMY, MOBILIZATION OF SPLENIC FLEXURE, WOUND VAC PLACEMENT, IRRIGATION AND DEBRIDMENT FLANK WOUND;  Surgeon: Kin Desouza D.O.;  Location: SURGERY [facility];  Service: General   • ZZZ CARDIAC CATH  07/21/2018    EF 45%, normal coronaries.   • IRRIGATION & DEBRIDEMENT ORTHO Right 2/19/2018    Procedure: IRRIGATION & DEBRIDEMENT ORTHO-FOOT;  Surgeon: Kirby Lopez M.D.;  Location: SURGERY [facility];  Service: Orthopedics   • TOE AMPUTATION Right 1/17/2018    Procedure: TOE AMPUTATION-1ST RAY;  Surgeon: Doug Delong M.D.;  Location: SURGERY [facility];  Service: Orthopedics   • TOE AMPUTATION Right 11/10/2017    Procedure: TOE AMPUTATION;  Surgeon: Osorio Leo M.D.;  Location: SURGERY [facility];  Service: Orthopedics     Family History   Problem Relation Age of Onset   • No Known Problems Mother    • No Known Problems Father      Social History     Socioeconomic History   • Marital status:      Spouse name: Not on file   • Number of children: Not on file   • Years of education: Not on file   • Highest education level: Not on file   Occupational History   • Not on file   Tobacco Use   • Smoking status: Never Smoker   • Smokeless tobacco: Never Used   Vaping Use   • Vaping Use: Never used   Substance and Sexual Activity   • Alcohol use: No   • Drug use: No   • Sexual activity: Not on file   Other Topics Concern   • Not on file   Social History Narrative   • Not on file     Social Determinants of Health     Financial Resource Strain:    • Difficulty of Paying Living Expenses:    Food Insecurity:    • Worried About Running Out of Food in the Last Year:    • Ran Out of Food in the Last Year:    Transportation Needs:    • Lack of Transportation (Medical):    • Lack of Transportation (Non-Medical):    Physical Activity:    • Days of Exercise per Week:    • Minutes of Exercise per Session:    Stress:    • Feeling of Stress :    Social Connections:    •  Frequency of Communication with Friends and Family:    • Frequency of Social Gatherings with Friends and Family:    • Attends Alevism Services:    • Active Member of Clubs or Organizations:    • Attends Club or Organization Meetings:    • Marital Status:    Intimate Partner Violence:    • Fear of Current or Ex-Partner:    • Emotionally Abused:    • Physically Abused:    • Sexually Abused:      Allergies   Allergen Reactions   • Zosyn [Piperacillin Sod-Tazobactam So] Rash and Itching     Redness/flushed throughout body.    • Daptomycin Rash     Body rash  RXN=1/2018     • Pcn [Penicillins] Hives and Rash      Rash & hives  RXN=since a kid  Tolerates cephalosporins   • Unasyn [Ampicillin-Sulbactam Sodium] Hives, Itching and Swelling   • Vancomycin Rash     Red man syndrome. Tolerates IV vancomycin at reduced infusion rate.     Outpatient Encounter Medications as of 7/27/2021   Medication Sig Dispense Refill   • clonazePAM (KLONOPIN) 0.5 MG Tab Take  by mouth 2 times a day.     • oxyCODONE CR (OXYCONTIN) 10 MG Tablet Extended Release 12 hour Abuse-Deterrent Take 10 mg by mouth every 12 hours.     • Zinc 50 MG Cap Take 50 mg by mouth every day.     • Tuberculin PPD (APLISOL ID) Inject  into the skin.     • levothyroxine (SYNTHROID) 150 MCG Tab Take 1 tablet by mouth every morning on an empty stomach. 30 tablet    • tamsulosin (FLOMAX) 0.4 MG capsule Take 1 capsule by mouth 1/2 hour after breakfast. 30 capsule    • amiodarone (CORDARONE) 200 MG Tab Take 1 tablet by mouth every day. 30 tablet    • calcium polycarbophil 625 MG Tab Take 1 tablet by mouth 2 times a day with meals. 30 tablet    • omeprazole (PRILOSEC) 40 MG delayed-release capsule Take 1 capsule by mouth every 12 hours. 30 capsule    • vitamin D (VITAMIND D3) 1000 UNIT Tab Take 1 tablet by mouth every day. 60 tablet    • magnesium oxide (MAG-OX) 400 MG Tab tablet Take 1 tablet by mouth 2 times a day.     • apixaban (ELIQUIS) 5mg Tab Take 1 tablet by mouth 2  times a day. Indications: Thromboembolism secondary to Atrial Fibrillation 60 tablet    • hydrocortisone (CORTEF) 5 MG Tab Take 1 tablet by mouth every day. 120 tablet    • PARoxetine (PAXIL) 20 MG Tab Take 1 tablet by mouth every day. 30 tablet    • simethicone (MYLICON) 80 MG Chew Tab Chew 1 tablet 3 times a day as needed (Gas pains). 30 tablet 3   • ziprasidone (GEODON) 60 MG Cap Take 1 capsule by mouth 2 times a day. 60 capsule    • metoprolol SR (TOPROL XL) 25 MG TABLET SR 24 HR Take 25 mg by mouth every day.     • [DISCONTINUED] digoxin (LANOXIN) 125 MCG Tab Take 1 tablet by mouth every day at 6 PM. 30 tablet      No facility-administered encounter medications on file as of 7/27/2021.     Review of Systems   Constitutional: Negative for chills, fever, malaise/fatigue and weight loss.   HENT: Negative for ear discharge, ear pain, hearing loss and nosebleeds.    Eyes: Negative for blurred vision, double vision, pain and discharge.   Respiratory: Negative for cough and shortness of breath.    Cardiovascular: Negative for chest pain, palpitations, orthopnea, claudication, leg swelling and PND.   Gastrointestinal: Negative for abdominal pain, blood in stool, melena, nausea and vomiting.   Genitourinary: Negative for dysuria and hematuria.   Musculoskeletal: Negative for falls, joint pain and myalgias.   Skin: Negative for itching and rash.   Neurological: Positive for weakness. Negative for dizziness, sensory change, speech change, loss of consciousness and headaches.   Endo/Heme/Allergies: Negative for environmental allergies. Does not bruise/bleed easily.   Psychiatric/Behavioral: Negative for depression, hallucinations and suicidal ideas.        Objective:   /82 (BP Location: Right arm, Patient Position: Sitting, BP Cuff Size: Adult)   Pulse 72   Ht 1.829 m (6')   Wt 118 kg (261 lb)   SpO2 94%   BMI 35.40 kg/m²     Physical Exam   Constitutional: He is oriented to person, place, and time. No distress.    HENT:   Head: Normocephalic and atraumatic.   Right Ear: External ear normal.   Left Ear: External ear normal.   Eyes: Right eye exhibits no discharge. Left eye exhibits no discharge.   Neck: No JVD present. No thyromegaly present.   Cardiovascular: Normal rate and normal heart sounds. Exam reveals no gallop and no friction rub.   No murmur heard.  There is presence of an irregularly irregular heartbeats.     Pulmonary/Chest: Breath sounds normal. No respiratory distress.   Abdominal: Bowel sounds are normal. He exhibits no distension. There is no abdominal tenderness.   Musculoskeletal:         General: No tenderness.   Neurological: He is alert and oriented to person, place, and time. No cranial nerve deficit.   Skin: Skin is warm and dry. He is not diaphoretic.   Psychiatric: His behavior is normal.   Nursing note and vitals reviewed.      Assessment:     1. Longstanding persistent atrial fibrillation (HCC)  EKG   2. Non-ischemic cardiomyopathy (HCC)         Medical Decision Making:  Today's Assessment / Status / Plan:   Today, based on physical examination findings, patient is euvolemic. No JVD, lungs are clear to auscultation, no pitting edema in bilateral lower extremities, no ascites.    Dry weight is 261 lbs.    No change in medications today except stopping digoxin to avoid toxicity.    Rate control strategy for his atrial fibrillation at this time.  Consider sinus restoration in the future once out of nursing home.    Continue amiodarone along with Toprol XL with current dose.    Continue anticoagulation with Eliquis 5 mg p.o. twice a day for stroke risk reduction.

## 2021-07-28 LAB — EKG IMPRESSION: NORMAL

## 2021-07-30 NOTE — PROGRESS NOTES
Operative Note      Patient: Sam Phan  YOB: 1986  MRN: [id number]    Date of Procedure: 7/30/2021    Pre-Op Diagnosis: RIGHT KNEE MEDIAL MENISCUS TEAR    Post-Op Diagnosis:   1. Right knee medial meniscus tear  2. Chondromalacia lateral tibial plateau grade 3  3. Chondromalacia medial femoral condyle grade 1       Procedure(s):  1. Right knee arthroscopic partial medial meniscectomy  2. Chondroplasty lateral tibial plateau    Surgeon(s):  Zee Villatoro MD    Assistant:   First Assistant: CHERRY Hartley CNP    Anesthesia: General    Estimated Blood Loss (mL): Minimal    Complications: None    Specimens:   * No specimens in log *    Implants:  * No implants in log *      Drains: * No LDAs found *    Findings: Radial tear medial meniscus at the junction of the posterior horn and midbody, chondral fissure with articular cartilage flaps of the lateral tibial plateau, grade I chondromalacia focal weightbearing portion medial femoral condyle    Detailed Description of Procedure:   After informed consent was obtained the patient brought to the operating room where a general anesthetic was administered. Exam under anesthesia the right knee revealed full range of motion and no instability. The right leg was prepped and draped in usual sterile fashion. Diagnostic arthroscopy was performed through standard anteromedial and anterolateral arthroscopy portals. Findings included intact articular cartilage of the patellofemoral joint. In the medial compartment there is a radial tear of the medial meniscus at the junction of the posterior horn and midbody. Using a arthroscopic biter and shaver a 35% meniscectomy was performed back to stable edge. There was focal grade I chondromalacia of the central weightbearing portion of the medial femoral condyle without unstable articular cartilage flaps. Articular cartilage of the medial tibial plateau was intact.     In the notch the ACL Tele: A fib 74 to 90.  - / .08 / -    Orthostatics done this afternoon on pt. Pt is asymptomatic. Discussed results w/ Hospitalist. Orders received. Will pass care to NOC RN @ [facility].    and PCL were intact. In the lateral compartment the lateral meniscus was intact. There is a chondral fissure more centrally from anterior to posterior with unstable articular cartilage flaps which were debrided with arthroscopic shaver back to stable edge. Articular cartilage of the lateral femoral condyle was intact. The knee joint was drained of arthroscopy fluid. Portals were closed with an observable suture. The joint was infiltrated with 30 mils half percent Marcaine with epinephrine. Steri-Strips and sterile dressing were placed. Patient was awakened and brought to the recovery in stable condition. There were no intraoperative or immediate postoperative complications.     Electronically signed by Ros Choi MD on 7/30/2021 at 5:00 PM

## 2021-08-02 ENCOUNTER — TELEPHONE (OUTPATIENT)
Dept: CARDIOLOGY | Facility: MEDICAL CENTER | Age: 56
End: 2021-08-02

## 2021-08-02 NOTE — TELEPHONE ENCOUNTER
Attempted calling patient for HF education, phone appears to be disconnected at this time, had continual busy signal

## 2021-09-25 NOTE — PROGRESS NOTES
Bedside report received.  Assessment complete.  A&O x 4. Patient calls appropriately.  Patient up with max assist. Bed alarm on.   Patient has 9/10 pain. *Medicated per MAR.  Denies N&V. Tolerating diet.  MLI, dressings changed.  Review plan with of care with patient. Call light and personal belongings with in reach. Hourly rounding in place. All needs met at this time.   Group Therapy Note    Date: 9/25/2021    Group Start Time: 1000  Group End Time: 1100  Group Topic: Psychoeducation    [facility]    [facility]        Group Therapy Note    Attendees: 15         Patient's Goal:  \"to make the most of the day\"    Notes:  Pt. attended the 1000 skill group. Pt. was relaxed and talkative. Good concentration and encouraging to other pts. Status After Intervention:  Improved    Participation Level:  Active Listener and Interactive    Participation Quality: Appropriate, Attentive and Sharing      Speech:  normal      Thought Process/Content: Logical      Affective Functioning: Congruent      Mood: happy        Level of consciousness:  Alert, Oriented x4 and Attentive      Response to Learning: Progressing to goal      Endings: None Reported    Modes of Intervention: Education, Support, Socialization and Activity      Discipline Responsible: Psychoeducational Specialist      Signature:  Myrna Peralta

## 2021-11-06 NOTE — ASSESSMENT & PLAN NOTE
New onset for last few days;   Now on tamsulosin and not retaining any more  Will monitor    PAST MEDICAL HISTORY:  HPV in female

## 2021-11-10 ENCOUNTER — TELEPHONE (OUTPATIENT)
Dept: CARDIOLOGY | Facility: MEDICAL CENTER | Age: 56
End: 2021-11-10

## 2021-11-10 NOTE — TELEPHONE ENCOUNTER
11/10/21 tried to call pt to get him r/s'd from missed appt w/ To from 11/9/21, but phone is busy. Could not r/s. vw

## 2021-12-01 NOTE — CONSULTS
Neurology consult note     Requesting physician: Lesley Fregoso M.D.     Reason for consultation: Orthostatic Hypotension     Chief complaint: Orthostatic Hypotension     History of present illness: 52 y/o M w/PMH of PAF, HTN, DLD, and DM2 c/b neuropathy was admitted to telemetry floor acute on chronic congestive heart failure. His CHF/fluid over load symptoms improved with aggressive diuresis and beta-blocker therapy. He was noted for orthostatic hypotension on 11/17/18 that improved with NS buleses but return shortly after. Diuresis was held as well as BP meds and he was started on Midodrine and Fludrocortisone. Patient is still symptomatic and thus Neurology was consulted for further evaluation.    Past Medical History:   Diagnosis Date   • A-fib (HCC)    • Chest pain    • Congestive heart failure (HCC)    • Diabetes (HCC)    • Diabetic neuropathy ([facility])    • Hypercholesterolemia    • Hypertension    • Morbid obesity ([facility])    • Noncompliance    • Normal cardiac stress test      Past Surgical History:   Procedure Laterality Date   • CARDIAC CATH  07/21/2018    EF 45%, normal coronaries.   • IRRIGATION & DEBRIDEMENT ORTHO Right 2/19/2018    Procedure: IRRIGATION & DEBRIDEMENT ORTHO-FOOT;  Surgeon: Kirby Lopez M.D.;  Location: [facility];  Service: Orthopedics   • TOE AMPUTATION Right 1/17/2018    Procedure: TOE AMPUTATION-1ST RAY;  Surgeon: Doug Delong M.D.;  Location: [facility];  Service: Orthopedics   • TOE AMPUTATION Right 11/10/2017    Procedure: TOE AMPUTATION;  Surgeon: Osorio Leo M.D.;  Location: [facility];  Service: Orthopedics     Family History   Problem Relation Age of Onset   • No Known Problems Mother    • No Known Problems Father        Social History     Social History   • Marital status:      Spouse name: N/A   • Number of children: N/A   • Years of education: N/A     Social History Main Topics   • Smoking status: Never Smoker   •  Smokeless tobacco: Never Used   • Alcohol use No   • Drug use: No   • Sexual activity: Not on file     Other Topics Concern   • Not on file     Social History Narrative   • No narrative on file       Patient Active Problem List    Diagnosis Date Noted   • Acute on chronic diastolic heart failure ([facility]) 11/21/2018     Priority: High   • Fall 07/10/2018     Priority: High   • Atypical chest pain 07/09/2018     Priority: High   • Diabetic foot ([facility]) 02/17/2018     Priority: High   • Diabetic infection of right foot ([facility]) 02/02/2018     Priority: High   • Lower limb amputation, great toe ([facility]) 01/15/2018     Priority: High   • Gangrene of toe of right foot, s/p recent amputation ([facility]) 11/11/2017     Priority: High   • Class 2 obesity due to excess calories without serious comorbidity with body mass index (BMI) of 37.0 to 37.9 in adult 10/22/2018     Priority: Medium   • Urinary retention 09/27/2018     Priority: Medium   • Other chest pain 09/10/2018     Priority: Medium   • Difficulty voiding 09/10/2018     Priority: Medium   • Paroxysmal A-fib ([facility]) 07/09/2018     Priority: Medium   • Left knee pain 11/23/2017     Priority: Medium   • Acquired circulating anticoagulants ([facility]) 07/09/2018     Priority: Low   • Essential hypertension 02/08/2018     Priority: Low   • GERD (gastroesophageal reflux disease) 01/15/2018     Priority: Low   • Mixed hyperlipidemia 01/15/2018     Priority: Low   • Type 2 diabetes mellitus, without long-term current use of insulin, with peripheral neuropathy ([facility]) 11/22/2017     Priority: Low   • Candidal intertrigo 11/11/2017     Priority: Low   • Hyponatremia 11/11/2017     Priority: Low   • Obesity 11/11/2017     Priority: Low   • Orthostatic hypotension 11/27/2018   • Seizure-like activity ([facility]) 11/24/2018   • Chronic systolic heart failure ([facility]) 11/20/2018   • Bilateral leg edema 11/20/2018   • Venous stasis dermatitis of both lower extremities 11/20/2018   • Fluid overload 11/20/2018   •  Nonischemic cardiomyopathy (HCC) 09/09/2018   • Vitamin B12 deficiency 07/24/2018   • Paralysis of both lower limbs (HCC) 07/22/2018   • Chest pain 07/20/2018   • Syncope 07/19/2018   • Dizziness 07/13/2018   • Acute conjunctivitis 07/11/2018   • Hyperglycemia 11/11/2017       Current Facility-Administered Medications   Medication Dose Route Frequency Provider Last Rate Last Dose   • pyridostigmine (MESTINON) tablet 60 mg  60 mg Oral TID Mariposa Jimenez M.D.       • [START ON 12/4/2018] midodrine (PROAMATINE) tablet 10 mg  10 mg Oral TID Lesley Fregoso M.D.       • fludrocortisone (FLORINEF) tablet 0.2 mg  0.2 mg Oral QAM Lesley Fregoso M.D.   0.2 mg at 12/03/18 0524   • triamcinolone acetonide (KENALOG) 0.5 % cream   Topical BID Yolie Ceron M.D.       • hydrALAZINE (APRESOLINE) injection 10 mg  10 mg Intravenous Q4HRS PRN Carson Briggs D.O.       • furosemide (LASIX) tablet 20 mg  20 mg Oral PRN Carly Peña, A.P.R.N.       • potassium chloride SA (Kdur) tablet 20 mEq  20 mEq Oral PRN Carly Peña A.P.R.N.       • pravastatin (PRAVACHOL) tablet 20 mg  20 mg Oral Q EVENING Sandra Mccann M.D.   20 mg at 12/02/18 1728   • rivaroxaban (XARELTO) tablet 20 mg  20 mg Oral PM MEAL Sandra Mccann M.D.   20 mg at 12/02/18 1728   • senna-docusate (PERICOLACE or SENOKOT S) 8.6-50 MG per tablet 2 Tab  2 Tab Oral BID Sandra Mccann M.D.   Stopped at 12/03/18 0600    And   • polyethylene glycol/lytes (MIRALAX) PACKET 1 Packet  1 Packet Oral QDAY PRN Sandra Mccann M.D.        And   • magnesium hydroxide (MILK OF MAGNESIA) suspension 30 mL  30 mL Oral QDAY PRN Sandra Mccann M.D.        And   • bisacodyl (DULCOLAX) suppository 10 mg  10 mg Rectal QDAY PRN Sandra Mccann M.D.       • acetaminophen (TYLENOL) tablet 650 mg  650 mg Oral Q6HRS PRN Sandra Mccann M.D.   650 mg at 11/28/18 1230   • labetalol (NORMODYNE,TRANDATE) injection 10 mg  10 mg Intravenous Q4HRS PRN Sandra Mccann M.D.       •  "cloNIDine (CATAPRES) tablet 0.1 mg  0.1 mg Oral Q6HRS PRN Sandra Mccann M.D.   0.1 mg at 12/01/18 2213   • ondansetron (ZOFRAN) syringe/vial injection 4 mg  4 mg Intravenous Q4HRS PRN Sandra Mccann M.D.       • ondansetron (ZOFRAN ODT) dispertab 4 mg  4 mg Oral Q4HRS PRN Sandra Mccann M.D.       • promethazine (PHENERGAN) tablet 12.5-25 mg  12.5-25 mg Oral Q4HRS PRN Sandra Mccann M.D.       • promethazine (PHENERGAN) suppository 12.5-25 mg  12.5-25 mg Rectal Q4HRS PRN Sandra Mccann M.D.       • prochlorperazine (COMPAZINE) injection 5-10 mg  5-10 mg Intravenous Q4HRS PRN Sandra Mccann M.D.       • nystatin (MYCOSTATIN) powder   Topical TID Sandra Mccann M.D.           ROS: As per HPI. Additional pertinent symptoms as noted below.  Constitutional: no fevers, chills, weight change  Eyes: no blurred vision, discharge, eye pain  ENT: no rhinorrhea, sore throat, neck masses  Cardiovascular: no angina, palpitations, PND, orthopnea, edema  Respiratory: no cough, sputum, or dyspnea  GI: no nausea, vomiting, abdominal pain, constipation, or diarrhea  : no dysuria, hematuria, frequency   Musculo-skeletal: B/L LE numbness/tingling  Skin: no rashes or open wounds  Neurological: Light headedness on standing  Psychological: no anxiety or depression      /64   Pulse 66   Temp 36.4 °C (97.6 °F) (Temporal)   Resp 16   Ht 1.829 m (6' 0.01\")   Wt 108 kg (238 lb 1.6 oz)   SpO2 95%   BMI 32.28 kg/m²     Physical Exam   Constitutional:  oriented to person, place, and time. No distress.   Eyes: Pupils are equal, round, and reactive to light. No scleral icterus.  Neck: Neck supple. No thyromegaly present.   Cardiovascular: Normal rate, regular rhythm and normal heart sounds.  Exam reveals no gallop and no friction rub.  No murmur heard.  Pulmonary/Chest: Breath sounds normal. Chest wall is not dull to percussion.   Musculoskeletal:B/L stasis dermatitis. B/L LE numbness/tingling, 3+ LE edema  Lymphadenopathy: no " cervical adenopathy  Neurological: alert and oriented to person, place, and time. CN 2-12 intact, DTR 2+ all through out and intact figure to nose. Stair and loss of balance on standing. Unable to ambulate    Skin: No cyanosis. Nails show no clubbing.    Assessment/Plan    #Orthostatic Hypotension  H/O of CHF and s/p aggressive diuresis  Diuresis and HTN meds held  PRN NS boluses mildly improved symptoms  Orthostatic HoTN refractory to midodrine and Florinef  Likely 2/2 Amio in the seating of possible DM autonomic dysfunction  DC Amio as it can contribute  Patient to drink a glass of water prior to standing  Keep head of bed elevated  Start Mestinon TID to be taken with Midodrine  Start compression stockings    PT for Orthostatic Hypotension exercise  Neurology signing off, please contact us if you have any questions      Moncho Bangura MD       Statement Selected

## 2022-01-01 NOTE — PROGRESS NOTES
Pharmacy Kinetics 53 y.o. male on vancomycin day # 1 2018    Currently on Vancomycin 2000 mg iv q12hr    Indication for Treatment: osteomyelitis    Pertinent history per medical record: Admitted on 2018 for a diabetic foot infection/osteomyelitis. This patient has a history of diabetic foot infections with multiple recent admission. He was most recently at a skilled nursing facility getting antibiotic treatment with daptomycin. This was planned through 18, but he developed a rash with the antibiotic and was switched to PO Zyvox x 1 week (ending on 2/15/18). The patient states that his foot got worse almost immediately after stopping the antibiotic and he presented to the ER for evaluation.     Other antibiotics: aztreonam 1 gm iv Q12H, Bactrim DS 1 tab BID    Allergies: Daptomycin; Pcn [penicillins]; and Unasyn [ampicillin-sulbactam sodium]     List concerns for renal function: obesity (BMI ~ 40), contrast on     Pertinent cultures to date:   18 wound, right foot: staphylococcus aureus (heavy growth), proteus species (light growth)  18 blood, peripheral x 2: NGTD    Recent Labs      18   2245  18   0047   WBC  11.4*  9.9   NEUTSPOLYS  57.20   --      Recent Labs      18   2245  18   0047   BUN  18  11   CREATININE  0.65  0.59     No results for input(s): VANCOTROUGH, VANCOPEAK, VANCORANDOM in the last 72 hours.  Intake/Output Summary (Last 24 hours) at 18 1447  Last data filed at 18 1400   Gross per 24 hour   Intake             4195 ml   Output             1175 ml   Net             3020 ml      Blood pressure 141/53, pulse 61, temperature 36.8 °C (98.2 °F), resp. rate 18, height 1.829 m (6'), weight (!) 133.3 kg (293 lb 14 oz), SpO2 94 %. Temp (24hrs), Av.8 °C (98.3 °F), Min:36.4 °C (97.6 °F), Max:37.3 °C (99.1 °F)      A/P   1. Vancomycin dose change: Not indicated at this time  2. Next vancomycin level: 0130 tomorrow  3. Goal trough: 12-16  mcg/mL  4. Comments: This patient is at increased risk for vancomycin accumulation due to his obesity.  Pharmacy to monitor closely.    Sarah Greene, XeniaD.       yes...

## 2022-01-18 NOTE — PROGRESS NOTES
Hospital Medicine Daily Progress Note    Date of Service  11/23/2018    Chief Complaint  53 y.o. male admitted 11/21/2018 with acute on chronic sCHF exacerbation    Hospital Course  See below    Interval Problem Update  sCHF-diuresed another 2 liters last night.    Rapid response called this AM as patient was found face down on the floor beside his bed. There was concern for seizure activity and was given 6 mg IV ativan. After a few minutes the patient came to and had full recollection of the vent. The fall was unwitnessed and there was no issues found on telemetry during the event. He quickly became responsive and passed out after urinating in the restroom. He thinks he  Might have hit his head. Denies any other symptoms right now other than mild frontal HA. No other neurological changes. CTH personally reviewed by me shows no e/o ICH or midline shift.    A fib-HR remains well controlled and tolerating xarelto without issue. No events as noted above.    Consultants/Specialty  cards    Code Status  FCfC    Disposition  tele    Review of Systems  Review of Systems   Respiratory: Negative for shortness of breath.    Cardiovascular: Positive for leg swelling. Negative for chest pain.   Neurological: Positive for tremors, loss of consciousness, weakness and headaches. Negative for dizziness.   All other systems reviewed and are negative.       Physical Exam  Temp:  [36.3 °C (97.3 °F)-36.7 °C (98.1 °F)] 36.4 °C (97.6 °F)  Pulse:  [61-85] 85  Resp:  [14-20] 16  BP: ()/(45-91) 108/65    Physical Exam   Constitutional: He is oriented to person, place, and time. He appears well-developed and well-nourished. No distress.   HENT:   Head: Normocephalic and atraumatic.   Mouth/Throat: No oropharyngeal exudate.   Eyes: Pupils are equal, round, and reactive to light.   Neck: Normal range of motion. Neck supple.   Cardiovascular: Normal rate, normal heart sounds and intact distal pulses.    No murmur heard.  Irregular  irregular   Pulmonary/Chest: Effort normal. No stridor. He has rales.   Abdominal: Soft. Bowel sounds are normal. There is no tenderness.   Obese   Musculoskeletal: Normal range of motion. He exhibits edema (4+ pitting edema B/L LE's, warm peripherally). He exhibits no tenderness.   Unchanged today   Neurological: He is alert and oriented to person, place, and time. Coordination normal.   Skin: Skin is warm and dry. No rash noted.   Redundant skin   Psychiatric: He has a normal mood and affect.   Nursing note and vitals reviewed.      Fluids    Intake/Output Summary (Last 24 hours) at 11/23/18 1332  Last data filed at 11/23/18 1200   Gross per 24 hour   Intake              240 ml   Output             3900 ml   Net            -3660 ml       Laboratory  Recent Labs      11/21/18 0836  11/22/18 0008  11/23/18 0159   WBC  5.1  5.1  4.7*   RBC  4.04*  4.03*  4.12*   HEMOGLOBIN  11.4*  11.6*  11.5*   HEMATOCRIT  36.3*  35.7*  36.6*   MCV  89.9  88.6  88.8   MCH  28.2  28.8  27.9   MCHC  31.4*  32.5*  31.4*   RDW  46.2  44.9  45.4   PLATELETCT  180  170  173   MPV  10.2  11.0  11.0     Recent Labs      11/21/18 0836  11/22/18 0007 11/23/18 0159   SODIUM  137  138  137   POTASSIUM  4.0  3.6  3.5*   CHLORIDE  104  100  98   CO2  26  30  31   GLUCOSE  102*  99  129*   BUN  30*  29*  28*   CREATININE  0.96  1.01  1.03   CALCIUM  8.9  8.9  8.8     Recent Labs      11/21/18 0836   APTT  43.5*   INR  2.12*     Recent Labs      11/21/18 0836   BNPBTYPENAT  117*     Recent Labs      11/22/18   0007   TRIGLYCERIDE  87   HDL  27*   LDL  60       Imaging  CT-HEAD W/O   Final Result      No acute intracranial abnormality is identified.      Paranasal sinus disease.      EC-ECHOCARDIOGRAM COMPLETE W/ CONT   Final Result      EC-ECHOCARDIOGRAM COMPLETE W/O CONT         DX-CHEST-PORTABLE (1 VIEW)   Final Result      1.  Hypoinflation without other evidence for acute cardiopulmonary disease.   2.  Stable cardiomegaly.       CT-HEAD W/O    (Results Pending)        Assessment/Plan  Acute on chronic diastolic heart failure (HCC)- (present on admission)   Assessment & Plan    -ECHO with EF 60%, most valves appear ok  -given diastolic dysfunction and A fib, likely an element of lost atrial kick  -attempt to chemically cardiovert with amio  -decrease torsemide to 40 mg daily starting tomorrow  -monitor lytes closely, free fluid restriction  -cards consult  -monitor on tele     Class 2 obesity due to excess calories without serious comorbidity with body mass index (BMI) of 37.0 to 37.9 in adult- (present on admission)   Assessment & Plan    Patient will need outpatient weight loss management program.  Body mass index is 37.97 kg/m².       Paroxysmal A-fib (HCC)- (present on admission)   Assessment & Plan    Continue at this point with rate control and anticoagulation.  -loading with amiodarone for possible chemical cardioversion     Syncope- (present on admission)   Assessment & Plan    -shaking is likely related to syncope, no h/o seizure D/O, CTH normal  -repeat CTH at 2 PM given on xarelto  -likely related to orthostatics and slight over diuresis  -no HR/rhythm issues during event  -bed alarm, monitor getting up slowly     Essential hypertension- (present on admission)   Assessment & Plan    Continue with blood pressure management optimization keep systolic blood pressure under 140 diastolic under 90.     Mixed hyperlipidemia- (present on admission)   Assessment & Plan    Low-fat low-cholesterol diet.  Continue with statin.  Lab Results   Component Value Date/Time    CHOLSTRLTOT 119 07/10/2018 02:30 AM    LDL 68 07/10/2018 02:30 AM    HDL 24 (A) 07/10/2018 02:30 AM    TRIGLYCERIDE 136 07/10/2018 02:30 AM               GERD (gastroesophageal reflux disease)- (present on admission)   Assessment & Plan    Continue with proton pump inhibitor.  Currently patient is not having heartburn.      Type 2 diabetes mellitus, without long-term current  use of insulin, with peripheral neuropathy (HCC)- (present on admission)   Assessment & Plan    -accus with sliding scale coverage, sugars are still well controlled  -fasting blood sugar during RR was normal  -diabetic diet  -diabetic education  -continue with oral antihyperglycemics  -monitor for hypoglycemic episodes and adjust control if he should get low          VTE prophylaxis: xarelto       Deep Sutures: 4-0 Vicryl

## 2022-03-09 NOTE — PROGRESS NOTES
With patient’s permission (if able), attempted to contact designated support person, name Jean BUENO Left voicemail with number to call back.   Additional Progress Note...

## 2022-06-24 NOTE — PROGRESS NOTES
2 RN skin check complete.   Devices in place: ostomy, L PIV.  Skin assessed under devices: not assessed under ostomy as this was not needing to be changed. .  Confirmed pressure ulcers found on: wounds to mid abdomen, R posterior thigh, and excoriation noted to low back and coccyx.  New potential pressure ulcers noted on: none. Wound consult placed: already in place.  The following interventions in place: patient on waffle overlay, turning encouraged and ambulation encouraged, wound care orders in place, mepilex placed to coccyx.   Patent

## 2022-08-25 NOTE — PROGRESS NOTES
Her tongue was negative for herpes.   The transplant doctor thought the sores were from the mycopheylate medication.   He recommended a short course of higher dose steroids to see if that heals up her tongue and gum sores. If not, then she would need to see ENT for a biopsy.     I will give her a new prescription to take instead of her 5 mg for the 5 days.    [facility] Medicine Daily Progress Note    Date of Service  3/5/2021    Chief Complaint  56 y.o. male admitted 2/9/2021 with   Chief Complaint   Patient presents with   • Syncope     X1 this morning when standing up to go to bathroom         Hospital Course  Mr. Sebastián Castro is a 56 y.o. male history of atrial fibrillation secondary to hypercoagulable state, diabetes complicated by diabetic neuropathy, congestive heart failure, hyperlipidemia, orthostatic hypotension on fludrocortisone who presented on 2/9/2021 with syncopal episode.  Patient reported dizziness prior to syncope.  On presentation to the emergency room she was hypertensive and bradycardic.  Cardiology recommended stopping all AV sanket blocking agents.  He was also started on midodrine for hypotension.    The patient was started on digoxin and apixaban for atrial fibrillation.  Echocardiogram showed EF of 60%, improved from previous echocardiogram obtained on 11/2020.    Of note patient has history of a spleen injury complicated by perforation of splenic flexure in September 2020.  Status post colostomy and Interiano's pouch in November 2020.  He was at home taking care of his colostomy prior to most recent admission.  However he has become depressed, overwhelmed, and can no longer take care of his colostomy independently.    Psych continues to follow the patient; psychiatric medication as per psych recommendation.      Interval Problem Update  Patient was seen and examined at bedside.  I have personally reviewed vitals, labs, and imaging.    3/2.  Afebrile.  Stable vitals.  On room air.  Denies fever, chills, chest pains, shortness of breath, dizziness, lightheadedness, palpitations.  Still reports depression and suicidal ideations.  He is now coming to  with having an ostomy.  3/3.  Afebrile.  Stable vitals.  On room air.  Replete mag, potassium.  Denies fever, chills, chest pains, shortness of breath.  Still reports some depression and low energy.   Reports some suicidal ideations.  Does not feel that he can take care of his ostomy by himself but cannot give a reason as to why not.  Did speak with general surgery Dr. Storm Gan who felt that reversible of his ostomy is not likely at this time.  3/4.  Afebrile.  Tachycardic this morning.  On room air.  Replete magnesium.  Hyponatremia is improved.  Patient has had nausea overnight and has not been able to keep down food and many medications.  He also had a bowel movement for the first time since his ostomy.  EKG this morning does show atrial fibrillation with a rate of 111.  He is on digoxin and apixaban.  Was previously on metoprolol and diltiazem for rate control but rate has been controlled without medications recently as well as he was having orthostatic hypotension and bradycardia.  Restart metoprolol.  Ordered for CT abdomen pelvis shows fluid distention in the colon.  QT is not prolonged.  We will continue to monitor nausea.  Started on IV fluids.  3/5.  Afebrile.  Remains in atrial fibrillation but rate controlled.  Hypotensive this morning.  Bump in kidney function this morning.  Replete magnesium, potassium.  Lactic acid within normal limits.  Patient has had poor oral intake and is still very nauseous.  Received fluid boluses overnight.  Will give 1 more bolus and ultrasound kidneys.  Patient denies dizziness, headedness, chest pains, shortness of breath.  He does report chronic palpitations attributed to his A. fib but nothing currently.  Still reports depression and low energy.    Consultants/Specialty  Psychiatry, cardiology    Code Status  Full Code    Disposition  To inpatient psychiatry    Review of Systems  Review of Systems   Constitutional: Negative for chills and fever.   HENT: Negative for congestion and sore throat.    Eyes: Negative for blurred vision.   Respiratory: Negative for cough and shortness of breath.    Cardiovascular: Positive for palpitations and leg swelling. Negative for  chest pain.   Gastrointestinal: Positive for nausea. Negative for abdominal pain, constipation, diarrhea and vomiting.   Genitourinary: Negative for dysuria, frequency and urgency.   Musculoskeletal: Negative for falls.   Skin: Negative for rash.   Neurological: Negative for dizziness, weakness and headaches.   Psychiatric/Behavioral: Positive for depression and suicidal ideas.   All other systems reviewed and are negative.       Physical Exam  Temp:  [36.3 °C (97.3 °F)-37.4 °C (99.4 °F)] 36.3 °C (97.4 °F)  Pulse:  [] 86  Resp:  [15-18] 18  BP: ()/(53-70) 91/61  SpO2:  [94 %-97 %] 96 %    Physical Exam  Vitals and nursing note reviewed.   Constitutional:       General: He is not in acute distress.     Appearance: Normal appearance. He is obese.   HENT:      Head: Normocephalic and atraumatic.      Right Ear: External ear normal.      Left Ear: External ear normal.      Nose: Nose normal.      Mouth/Throat:      Mouth: Mucous membranes are moist.      Pharynx: Oropharynx is clear.   Eyes:      Extraocular Movements: Extraocular movements intact.      Conjunctiva/sclera: Conjunctivae normal.   Cardiovascular:      Rate and Rhythm: Normal rate and regular rhythm.      Pulses: Normal pulses.      Heart sounds: Normal heart sounds.   Pulmonary:      Effort: Pulmonary effort is normal. No respiratory distress.      Breath sounds: Normal breath sounds. No wheezing.   Abdominal:      General: Abdomen is flat. Bowel sounds are normal. There is no distension.      Palpations: Abdomen is soft.      Tenderness: There is no abdominal tenderness.      Comments: Does have ostomy in place  Also noted to have wound  With granulation tissue    Musculoskeletal:         General: Normal range of motion.      Cervical back: Normal range of motion.      Right lower leg: Edema present.      Left lower leg: Edema present.      Comments: Varicose veins and swelling of lower extremities   Skin:     General: Skin is warm.    Neurological:      General: No focal deficit present.      Mental Status: He is alert and oriented to person, place, and time. Mental status is at baseline.      Cranial Nerves: No cranial nerve deficit.      Motor: No weakness.   Psychiatric:         Behavior: Behavior normal.      Comments: Depressed mood         Fluids    Intake/Output Summary (Last 24 hours) at 3/5/2021 0701  Last data filed at 3/4/2021 0800  Gross per 24 hour   Intake --   Output 400 ml   Net -400 ml       Laboratory  Recent Labs     03/03/21  0017 03/05/21  0006   WBC 10.9* 11.6*   RBC 5.31 5.65   HEMOGLOBIN 15.6 16.2   HEMATOCRIT 46.5 49.1   MCV 87.6 86.9   MCH 29.4 28.7   MCHC 33.5* 33.0*   RDW 45.0 44.9   PLATELETCT 231 212   MPV 10.5 11.5     Recent Labs     03/03/21  0017 03/04/21  0009 03/05/21  0006   SODIUM 128* 133* 130*   POTASSIUM 3.5* 3.9 3.9   CHLORIDE 98 98 98   CO2 23 21 22   GLUCOSE 133* 147* 130*   BUN 32* 26* 35*   CREATININE 1.29 0.98 1.21   CALCIUM 9.7 9.8 9.2                   Imaging  CT-ABDOMEN-PELVIS WITH   Final Result      1.  There is no evidence of small bowel obstruction.   2.  There is a left lower quadrant ostomy.   3.  There is fluid distention of the colon distal to the surgical material in the left mid descending colon extending all the way to the rectum. There is no pneumatosis or free air.   4.  There is cholelithiasis without biliary dilatation.   5.  There has been interval removal of the drainage catheter anterior to the spleen with minimal hypodense area in the anterior spleen again noted. There is no new fluid collection.      IR-US GUIDED PIV   Final Result    Ultrasound-guided PERIPHERAL IV INSERTION performed by    qualified nursing staff as above.      EC-ECHOCARDIOGRAM COMPLETE W/O CONT   Final Result      DX-LUMBAR SPINE-2 OR 3 VIEWS   Final Result      Moderate compression deformity of T11 is new compared to 2018.      Mild wedge deformity of T12 is unchanged.      Degenerative changes  including facet arthropathy.      Mild retrolisthesis of L5 on S1 and L3 on L4.              Assessment/Plan  * Suicidal ideation  Assessment & Plan  Has history of MDD, recurrent with psychotic features with active SI w/ plan.   --Psych following, continue cycling medication as per psych recommendation.    Patient is medically cleared to be transferred to inpatient psych    Major depressive disorder, recurrent, severe with psychotic features (HCC)  Assessment & Plan  -Psychiatry and Psychology following   -Psych medication as per psych recommendation    Colostomy present (HCC)- (present on admission)  Assessment & Plan  History of fall at home Nov 2020 on left side with perforated colon and splenic fluid collection/hematoma s/p segmental colectomy, colostomy, mobilization of the splenic flexure, wound vac, and I&D Nov 2020.   -Continue to provide education for colostomy care.  Patient must be independently able to care for his ostomy before inpatient psychiatry will accept him  I did speak with [facility].  Reversal of ostomy is unlikely if it has not been 6 months.  Also needs further work-up such as a colonoscopy prior to reversal.    Orthostatic hypotension- (present on admission)  Assessment & Plan  Multifactorial including medications (beta blocker, CCB, tamsulosin), diabetic autonomic dysfunction, and venous insufficiency   --EF is noted to be 60%, significant improvement from prior.   --Per cardiology, no further cardiac work-up needed  -Patient need to follow-up with cardiology as an outpatient.    Syncope  Assessment & Plan  Likely secondary to orthostatic hypotension  Echo normal with 60% ejection fraction, improved from prior    Paroxysmal atrial fibrillation (HCC)  Assessment & Plan  CHADSVASC 3 (HTN, CHF, DM). Rates 60s-70s w/out beta blockade. TTE Feb 2021: EF 60%,   -Continue digoxin 0.125 mg p.o. daily, Eliquis 5 mg p.o. twice daily  Restart metoprolol XL for rate control    Chronic  HFrEF (heart failure with reduced ejection fraction) ([facility])  Assessment & Plan  EF 60% on repeat echo.  Patient is currently euvolemic.    I/Os, daily weight and fluid restriction to 1.5 L  Continue metoprolol XL    Diabetes mellitus type 2, insulin dependent ([facility])  Assessment & Plan  Lab Results   Component Value Date/Time    HBA1C 5.2 02/14/2021 0130    HBA1C 7.8 (H) 11/10/2020 0331    HBA1C 7.7 (H) 10/22/2019 0823     Results from last 7 days   Lab Units 03/04/21  0736 03/03/21  2122 03/03/21  1733 03/03/21  1250 03/03/21  0830 03/02/21  1827 03/02/21  0802 03/01/21  1747   ACCU CHECK GLUCOSE 788 mg/dL 167* 129* 134* 114* 135* 126* 120* 110*     I have ordered insulin sliding scale with D50 and glucagon for hypoglycemia per protocol.  Diabetic diet  Diabetic education    Hypothyroidism- (present on admission)  Assessment & Plan  TSH wnl Feb 2021  -continue home Levothyroxine    Essential hypertension- (present on admission)  Assessment & Plan  Blood pressure well controlled, monitor.    Has been hypotensive  Decrease midodrine  5 mg p.o. twice daily.      Lower limb amputation, great toe (HCC)- (present on admission)  Assessment & Plan  s/p right great toe amputation 2018    Elevated troponin  Assessment & Plan  RESOLVED  Stable. Suspect due to demand ischemia. No need to trend further.    Debility- (present on admission)  Assessment & Plan  -PT/OT evaluated and appreciated recs    Chronic venous insufficiency of lower extremity  Assessment & Plan  -elevated legs  -compression stockings  -lotion, soap/water to legs  -weight loss (BMI 38)    Nonischemic cardiomyopathy (HCC)- (present on admission)  Assessment & Plan  Normal coronaries, 2018.Tachycardia mediated  -LVEF improved, based on echo 2/10/2021 LVEF 60%    Mixed hyperlipidemia- (present on admission)  Assessment & Plan  LDL 82 Feb 2021  -repeat lipid panel  -continue Atorvatatin    Obesity- (present on admission)  Assessment & Plan  Body mass index is 37.79  kg/m².  Counseled about diet and exercise       VTE prophylaxis:  eliquis

## 2022-08-29 ENCOUNTER — OFFICE VISIT (OUTPATIENT)
Dept: CARDIOLOGY | Facility: MEDICAL CENTER | Age: 57
End: 2022-08-29
Payer: MEDICARE

## 2022-08-29 VITALS
HEIGHT: 72 IN | OXYGEN SATURATION: 97 % | DIASTOLIC BLOOD PRESSURE: 68 MMHG | WEIGHT: 289 LBS | RESPIRATION RATE: 14 BRPM | HEART RATE: 84 BPM | SYSTOLIC BLOOD PRESSURE: 92 MMHG | BODY MASS INDEX: 39.14 KG/M2

## 2022-08-29 DIAGNOSIS — I87.2 CHRONIC VENOUS INSUFFICIENCY OF LOWER EXTREMITY: ICD-10-CM

## 2022-08-29 DIAGNOSIS — I87.2 VENOUS STASIS DERMATITIS OF BOTH LOWER EXTREMITIES: ICD-10-CM

## 2022-08-29 DIAGNOSIS — E66.9 OBESITY (BMI 30-39.9): ICD-10-CM

## 2022-08-29 DIAGNOSIS — I42.8 NON-ISCHEMIC CARDIOMYOPATHY (HCC): ICD-10-CM

## 2022-08-29 DIAGNOSIS — Z79.899 HIGH RISK MEDICATION USE: ICD-10-CM

## 2022-08-29 DIAGNOSIS — I50.32 CHRONIC HEART FAILURE WITH PRESERVED EJECTION FRACTION (HCC): ICD-10-CM

## 2022-08-29 DIAGNOSIS — E78.2 MIXED HYPERLIPIDEMIA: ICD-10-CM

## 2022-08-29 DIAGNOSIS — Z79.4 TYPE 2 DIABETES MELLITUS WITH HYPERGLYCEMIA, WITH LONG-TERM CURRENT USE OF INSULIN (HCC): ICD-10-CM

## 2022-08-29 DIAGNOSIS — I48.11 LONGSTANDING PERSISTENT ATRIAL FIBRILLATION (HCC): ICD-10-CM

## 2022-08-29 DIAGNOSIS — E11.65 TYPE 2 DIABETES MELLITUS WITH HYPERGLYCEMIA, WITH LONG-TERM CURRENT USE OF INSULIN (HCC): ICD-10-CM

## 2022-08-29 DIAGNOSIS — R53.81 DEBILITY: ICD-10-CM

## 2022-08-29 PROCEDURE — 99215 OFFICE O/P EST HI 40 MIN: CPT | Performed by: NURSE PRACTITIONER

## 2022-08-29 PROCEDURE — 93000 ELECTROCARDIOGRAM COMPLETE: CPT | Performed by: STUDENT IN AN ORGANIZED HEALTH CARE EDUCATION/TRAINING PROGRAM

## 2022-08-29 RX ORDER — AMIODARONE HYDROCHLORIDE 200 MG/1
200 TABLET ORAL DAILY
Qty: 90 TABLET | Refills: 3 | Status: SHIPPED | OUTPATIENT
Start: 2022-08-29 | End: 2022-11-29

## 2022-08-29 RX ORDER — FUROSEMIDE 20 MG/1
20 TABLET ORAL DAILY
Qty: 90 TABLET | Refills: 3 | Status: SHIPPED | OUTPATIENT
Start: 2022-08-29 | End: 2022-11-05

## 2022-08-29 RX ORDER — METOPROLOL SUCCINATE 25 MG/1
25 TABLET, EXTENDED RELEASE ORAL DAILY
Qty: 90 TABLET | Refills: 3 | Status: SHIPPED | OUTPATIENT
Start: 2022-08-29 | End: 2022-11-05

## 2022-08-29 ASSESSMENT — ENCOUNTER SYMPTOMS
TINGLING: 0
NAUSEA: 0
BLOOD IN STOOL: 0
WEIGHT LOSS: 0
FALLS: 0
COUGH: 0
MYALGIAS: 0
SHORTNESS OF BREATH: 0
ABDOMINAL PAIN: 0
CLAUDICATION: 0
DIZZINESS: 0
LOSS OF CONSCIOUSNESS: 0
BRUISES/BLEEDS EASILY: 0
PALPITATIONS: 0
PND: 0
BLURRED VISION: 0
VOMITING: 0
FEVER: 0
ORTHOPNEA: 0

## 2022-08-29 ASSESSMENT — FIBROSIS 4 INDEX: FIB4 SCORE: 0.79

## 2022-08-29 NOTE — PROGRESS NOTES
Chief Complaint   Patient presents with    Atrial Fibrillation     F/V Dx: Longstanding persistent atrial fibrillation (HCC)    Congestive Heart Failure     F/V Dx: Chronic heart failure with preserved ejection fraction (HCC)      Hyperlipidemia     F/V Dx: Mixed hyperlipidemia         Subjective     Sebastián Castro is a 57 y.o. male who presents today for follow-up regarding persistent A. fib, history of nonischemic cardiomyopathy.  Patient was last seen by Dr. Larson on 7/27/2021.    Today, Patient feels well, patient continues to live at skilled nursing facility due to limited mobility uses four-wheel walker.  Patient denies chest pain, shortness of breath, palpitations, dizziness/lightheadedness, orthopnea, PND.  Patient notes baseline lower extremity edema due to venous insufficiency. based on physical examination findings, patient is euvolemic. No JVD, lungs are clear to auscultation, baseline generalized 1+ edema in bilateral lower extremities, no ascites. Dry weight is 289 lbs.    EKG in office personally interpreted by me as A. fib with nonspecific IVCD.    As patient was lost to follow-up and he has been in persistent A. fib over a year, we discussed goals and options regarding converting to sinus rhythm or remaining in permanent A. fib.  Patient would prefer to be referred to EP for possible ablation.  Patient has also not had any amiodarone surveillance for high risk medication use over the last year, we will order thyroid, renal, liver function monitoring today.  Patient agreeable to continue medication.  We will also obtain follow-up echocardiogram.  We will continue medication without dosage changes today as patient denies adverse reactions.  Brief written recommendations provided to patient for his skilled nursing facility.    Past Medical History:   Diagnosis Date    A-fib (HCC)     Abscess 11/8/2020    Bacteremia 11/8/2020    Chest pain     Congestive heart failure (HCC)     Diabetes (HCC)     Diabetic  neuropathy ([facility])     Hypercholesterolemia     Hypertension     Longstanding persistent atrial fibrillation ([facility]) 11/8/2020    LV dysfunction 11/8/2020    Morbid obesity ([facility])     NICM (nonischemic cardiomyopathy) ([facility]) 11/8/2020    Noncompliance     Normal cardiac stress test     Orthostatic hypotension     Sepsis ([facility]) 11/8/2020     Past Surgical History:   Procedure Laterality Date    OR EXPLORATORY OF ABDOMEN  5/27/2021    Procedure: LAPAROTOMY, EXPLORATORY;  Surgeon: Kin Desouza D.O.;  Location: [facility];  Service: General    COLOSTOMY TAKEDOWN  5/27/2021    Procedure: COLOSTOMY TAKEDOWN AND CLOSURE;  Surgeon: Kin Desouza D.O.;  Location: [facility];  Service: General    COLECTOMY N/A 11/10/2020    Procedure: COLECTOMY-SEGMENTAL, COLOSTOMY, MOBILIZATION OF SPLENIC FLEXURE, WOUND VAC PLACEMENT, IRRIGATION AND DEBRIDMENT FLANK WOUND;  Surgeon: Kin Desouza D.O.;  Location: [facility];  Service: General    ZZZ CARDIAC CATH  07/21/2018    EF 45%, normal coronaries.    IRRIGATION & DEBRIDEMENT ORTHO Right 2/19/2018    Procedure: IRRIGATION & DEBRIDEMENT ORTHO-FOOT;  Surgeon: Kirby Lopez M.D.;  Location: [facility];  Service: Orthopedics    TOE AMPUTATION Right 1/17/2018    Procedure: TOE AMPUTATION-1ST RAY;  Surgeon: Doug Delong M.D.;  Location: [facility];  Service: Orthopedics    TOE AMPUTATION Right 11/10/2017    Procedure: TOE AMPUTATION;  Surgeon: Osorio Leo M.D.;  Location: [facility];  Service: Orthopedics     Family History   Problem Relation Age of Onset    No Known Problems Mother     No Known Problems Father      Social History     Socioeconomic History    Marital status:      Spouse name: Not on file    Number of children: Not on file    Years of education: Not on file    Highest education level: Not on file   Occupational History    Not on file   Tobacco Use    Smoking status: Never    Smokeless tobacco:  Never   Vaping Use    Vaping Use: Never used   Substance and Sexual Activity    Alcohol use: No    Drug use: No    Sexual activity: Not on file   Other Topics Concern    Not on file   Social History Narrative    Not on file     Social Determinants of Health     Financial Resource Strain: Not on file   Food Insecurity: Not on file   Transportation Needs: Not on file   Physical Activity: Not on file   Stress: Not on file   Social Connections: Not on file   Intimate Partner Violence: Not on file   Housing Stability: Not on file     Allergies   Allergen Reactions    Zosyn [Piperacillin Sod-Tazobactam So] Rash and Itching     Redness/flushed throughout body.     Daptomycin Rash     Body rash  RXN=1/2018      Pcn [Penicillins] Hives and Rash      Rash & hives  RXN=since a kid  Tolerates cephalosporins    Unasyn [Ampicillin-Sulbactam Sodium] Hives, Itching and Swelling    Vancomycin Rash     Red man syndrome. Tolerates IV vancomycin at reduced infusion rate.     Outpatient Encounter Medications as of 8/29/2022   Medication Sig Dispense Refill    amiodarone (CORDARONE) 200 MG Tab Take 1 Tablet by mouth every day. 90 Tablet 3    apixaban (ELIQUIS) 5mg Tab Take 1 Tablet by mouth 2 times a day. Indications: Thromboembolism secondary to Atrial Fibrillation 180 Tablet 3    metoprolol SR (TOPROL XL) 25 MG TABLET SR 24 HR Take 1 Tablet by mouth every day. 90 Tablet 3    furosemide (LASIX) 20 MG Tab Take 1 Tablet by mouth every day. 90 Tablet 3    clonazePAM (KLONOPIN) 0.5 MG Tab Take  by mouth 2 times a day.      oxyCODONE CR (OXYCONTIN) 10 MG Tablet Extended Release 12 hour Abuse-Deterrent Take 10 mg by mouth every 12 hours.      Zinc 50 MG Cap Take 50 mg by mouth every day.      levothyroxine (SYNTHROID) 150 MCG Tab Take 1 tablet by mouth every morning on an empty stomach. 30 tablet     tamsulosin (FLOMAX) 0.4 MG capsule Take 1 capsule by mouth 1/2 hour after breakfast. 30 capsule     calcium polycarbophil 625 MG Tab Take 1  tablet by mouth 2 times a day with meals. 30 tablet     omeprazole (PRILOSEC) 40 MG delayed-release capsule Take 1 capsule by mouth every 12 hours. 30 capsule     vitamin D (VITAMIND D3) 1000 UNIT Tab Take 1 tablet by mouth every day. 60 tablet     magnesium oxide (MAG-OX) 400 MG Tab tablet Take 1 tablet by mouth 2 times a day.      hydrocortisone (CORTEF) 5 MG Tab Take 1 tablet by mouth every day. 120 tablet     PARoxetine (PAXIL) 20 MG Tab Take 1 tablet by mouth every day. 30 tablet     simethicone (MYLICON) 80 MG Chew Tab Chew 1 tablet 3 times a day as needed (Gas pains). 30 tablet 3    ziprasidone (GEODON) 60 MG Cap Take 1 capsule by mouth 2 times a day. 60 capsule     [DISCONTINUED] Tuberculin PPD (APLISOL ID) Inject  into the skin. (Patient not taking: Reported on 8/29/2022)      [DISCONTINUED] amiodarone (CORDARONE) 200 MG Tab Take 1 tablet by mouth every day. 30 tablet     [DISCONTINUED] apixaban (ELIQUIS) 5mg Tab Take 1 tablet by mouth 2 times a day. Indications: Thromboembolism secondary to Atrial Fibrillation 60 tablet     [DISCONTINUED] metoprolol SR (TOPROL XL) 25 MG TABLET SR 24 HR Take 25 mg by mouth every day.       No facility-administered encounter medications on file as of 8/29/2022.     Review of Systems   Constitutional:  Negative for fever, malaise/fatigue and weight loss.   Eyes:  Negative for blurred vision.   Respiratory:  Negative for cough and shortness of breath.    Cardiovascular:  Negative for chest pain, palpitations, orthopnea, claudication, leg swelling and PND.   Gastrointestinal:  Negative for abdominal pain, blood in stool, nausea and vomiting.   Genitourinary:  Negative for dysuria, frequency and hematuria.   Musculoskeletal:  Negative for falls and myalgias.   Neurological:  Negative for dizziness, tingling and loss of consciousness.   Endo/Heme/Allergies:  Does not bruise/bleed easily.            Objective     BP (!) 92/68 (BP Location: Left arm, Patient Position: Sitting, BP  Cuff Size: Adult)   Pulse 84   Resp 14   Ht 1.829 m (6')   Wt (!) 131 kg (289 lb)   SpO2 97%   BMI 39.20 kg/m²     Physical Exam  Vitals reviewed.   Constitutional:       Appearance: He is well-developed.      Comments: Ambulates with four-wheel walker   HENT:      Head: Normocephalic and atraumatic.   Eyes:      Pupils: Pupils are equal, round, and reactive to light.   Neck:      Vascular: No JVD.   Cardiovascular:      Rate and Rhythm: Normal rate. Rhythm irregular.      Heart sounds: Normal heart sounds. No murmur heard.    No friction rub. No gallop.   Pulmonary:      Effort: Pulmonary effort is normal. No respiratory distress.      Breath sounds: Normal breath sounds.   Musculoskeletal:      Right lower le+ Edema present.      Left lower le+ Edema present.   Skin:     General: Skin is warm and dry.      Findings: No erythema.   Neurological:      Mental Status: He is alert and oriented to person, place, and time.   Psychiatric:         Behavior: Behavior normal.          Lab Results   Component Value Date/Time    CHOLSTRLTOT 146 2021 01:30 AM    LDL 82 2021 01:30 AM    HDL 32 (A) 2021 01:30 AM    TRIGLYCERIDE 160 (H) 2021 01:30 AM       Lab Results   Component Value Date/Time    SODIUM 139 2021 08:08 AM    POTASSIUM 3.9 2021 08:08 AM    CHLORIDE 102 2021 08:08 AM    CO2 28 2021 08:08 AM    GLUCOSE 128 (H) 2021 08:08 AM    BUN 16 2021 08:08 AM    CREATININE 1.07 2021 08:08 AM     Lab Results   Component Value Date/Time    ALKPHOSPHAT 112 (H) 2021 08:20 AM    ASTSGOT 18 2021 08:20 AM    ALTSGPT 13 2021 08:20 AM    TBILIRUBIN 0.3 2021 08:20 AM          Assessment & Plan     1. Longstanding persistent atrial fibrillation (HCC)  EKG    REFERRAL TO CARDIOLOGY    EC-ECHOCARDIOGRAM COMPLETE W/O CONT    proBrain Natriuretic Peptide, NT    Lipid Profile    HEMOGLOBIN A1C - Lab Collect    Comp Metabolic Panel    TSH  WITH REFLEX TO FT4    Referral to Nutrition Services      2. Non-ischemic cardiomyopathy (HCC)  REFERRAL TO CARDIOLOGY    EC-ECHOCARDIOGRAM COMPLETE W/O CONT    proBrain Natriuretic Peptide, NT    Lipid Profile    HEMOGLOBIN A1C - Lab Collect    Comp Metabolic Panel    TSH WITH REFLEX TO FT4    Referral to Nutrition Services      3. Venous stasis dermatitis of both lower extremities  REFERRAL TO CARDIOLOGY    EC-ECHOCARDIOGRAM COMPLETE W/O CONT    proBrain Natriuretic Peptide, NT    Lipid Profile    HEMOGLOBIN A1C - Lab Collect    Comp Metabolic Panel    TSH WITH REFLEX TO FT4    Referral to Nutrition Services      4. Type 2 diabetes mellitus with hyperglycemia, with long-term current use of insulin (HCC)  REFERRAL TO CARDIOLOGY    EC-ECHOCARDIOGRAM COMPLETE W/O CONT    proBrain Natriuretic Peptide, NT    Lipid Profile    HEMOGLOBIN A1C - Lab Collect    Comp Metabolic Panel    TSH WITH REFLEX TO FT4    Referral to Nutrition Services      5. Chronic heart failure with preserved ejection fraction (HCC)  REFERRAL TO CARDIOLOGY    EC-ECHOCARDIOGRAM COMPLETE W/O CONT    proBrain Natriuretic Peptide, NT    Lipid Profile    HEMOGLOBIN A1C - Lab Collect    Comp Metabolic Panel    TSH WITH REFLEX TO FT4    Referral to Nutrition Services      6. Mixed hyperlipidemia  REFERRAL TO CARDIOLOGY    EC-ECHOCARDIOGRAM COMPLETE W/O CONT    proBrain Natriuretic Peptide, NT    Lipid Profile    HEMOGLOBIN A1C - Lab Collect    Comp Metabolic Panel    TSH WITH REFLEX TO FT4    Referral to Nutrition Services      7. Debility  REFERRAL TO CARDIOLOGY    EC-ECHOCARDIOGRAM COMPLETE W/O CONT    proBrain Natriuretic Peptide, NT    Lipid Profile    HEMOGLOBIN A1C - Lab Collect    Comp Metabolic Panel    TSH WITH REFLEX TO FT4    Referral to Nutrition Services      8. Chronic venous insufficiency of lower extremity  REFERRAL TO CARDIOLOGY    EC-ECHOCARDIOGRAM COMPLETE W/O CONT    proBrain Natriuretic Peptide, NT    Lipid Profile    HEMOGLOBIN A1C -  Lab Collect    Comp Metabolic Panel    TSH WITH REFLEX TO FT4    Referral to Nutrition Services      9. Obesity (BMI 30-39.9)  Referral to Nutrition Services      10. High risk medication use            Medical Decision Making: Today's Assessment/Status/Plan:        Nonischemic cardiomyopathy; Atrial fibrillation; venous insufficiency; hyperlipidemia; obesity  -Type: persistent  -Continue amiodarone 200 mg daily.  High risk medication use surveillance with thyroid and liver function monitoring.  -Continue Lasix 20 mg daily  -Continue metoprolol 25 mg daily  -Continue Eliquis 5 mg twice daily  -Follow-up echo ordered, BNP  -Check annual A1c and lipids.  No statin therapy at this time.  Consider follow-up  -Referral to nutrition services placed  -Referral to EP for possible A. fib ablation    FU in clinic in 6 months with Dr. Larson. Sooner if needed.    Patient verbalizes understanding and agrees with the plan of care.     I personally spent a total of 42 minutes which includes face-to-face time and non-face-to-face time spent on preparing to see the patient, reviewing prior notes and tests, obtaining history from the patient, performing a medically appropriate exam, counseling and educating the patient, ordering medications/tests/procedures/referrals as clinically indicated,and documenting information in the electronic medical record.    RUBI Villalta.R.[email or web address].   [facility]  [phone]    PLEASE NOTE: This Note was created using voice recognition Software. I have made every reasonable attempt to correct obvious errors, but I expect that there are errors of grammar and possibly content that I did not discover before finalizing the note

## 2022-08-30 LAB — EKG IMPRESSION: NORMAL

## 2022-10-18 ENCOUNTER — TELEPHONE (OUTPATIENT)
Dept: CARDIOLOGY | Facility: MEDICAL CENTER | Age: 57
End: 2022-10-18
Payer: MEDICARE

## 2022-11-05 ENCOUNTER — HOSPITAL ENCOUNTER (INPATIENT)
Facility: MEDICAL CENTER | Age: 57
LOS: 12 days | DRG: 871 | End: 2022-11-17
Attending: EMERGENCY MEDICINE | Admitting: STUDENT IN AN ORGANIZED HEALTH CARE EDUCATION/TRAINING PROGRAM
Payer: MEDICARE

## 2022-11-05 ENCOUNTER — APPOINTMENT (OUTPATIENT)
Dept: RADIOLOGY | Facility: MEDICAL CENTER | Age: 57
DRG: 871 | End: 2022-11-05
Attending: EMERGENCY MEDICINE
Payer: MEDICARE

## 2022-11-05 ENCOUNTER — APPOINTMENT (OUTPATIENT)
Dept: RADIOLOGY | Facility: MEDICAL CENTER | Age: 57
DRG: 871 | End: 2022-11-05
Attending: STUDENT IN AN ORGANIZED HEALTH CARE EDUCATION/TRAINING PROGRAM
Payer: MEDICARE

## 2022-11-05 ENCOUNTER — APPOINTMENT (OUTPATIENT)
Dept: CARDIOLOGY | Facility: MEDICAL CENTER | Age: 57
DRG: 871 | End: 2022-11-05
Payer: MEDICARE

## 2022-11-05 DIAGNOSIS — I95.9 HYPOTENSION, UNSPECIFIED HYPOTENSION TYPE: ICD-10-CM

## 2022-11-05 DIAGNOSIS — N39.0 URINARY TRACT INFECTION WITH HEMATURIA, SITE UNSPECIFIED: ICD-10-CM

## 2022-11-05 DIAGNOSIS — R31.9 URINARY TRACT INFECTION WITH HEMATURIA, SITE UNSPECIFIED: ICD-10-CM

## 2022-11-05 DIAGNOSIS — I50.23 ACUTE ON CHRONIC SYSTOLIC HEART FAILURE (HCC): ICD-10-CM

## 2022-11-05 DIAGNOSIS — E87.1 HYPONATREMIA: ICD-10-CM

## 2022-11-05 DIAGNOSIS — E87.6 HYPOKALEMIA: ICD-10-CM

## 2022-11-05 DIAGNOSIS — E86.0 DEHYDRATION: ICD-10-CM

## 2022-11-05 DIAGNOSIS — N17.9 ACUTE RENAL FAILURE, UNSPECIFIED ACUTE RENAL FAILURE TYPE (HCC): ICD-10-CM

## 2022-11-05 DIAGNOSIS — A41.9 SEPSIS, DUE TO UNSPECIFIED ORGANISM, UNSPECIFIED WHETHER ACUTE ORGAN DYSFUNCTION PRESENT (HCC): ICD-10-CM

## 2022-11-05 DIAGNOSIS — R78.81 BACTEREMIA DUE TO GRAM-POSITIVE BACTERIA: ICD-10-CM

## 2022-11-05 DIAGNOSIS — R78.81 STREPTOCOCCAL BACTEREMIA: ICD-10-CM

## 2022-11-05 DIAGNOSIS — B95.5 STREPTOCOCCAL BACTEREMIA: ICD-10-CM

## 2022-11-05 DIAGNOSIS — R19.7 DIARRHEA, UNSPECIFIED TYPE: ICD-10-CM

## 2022-11-05 PROBLEM — R65.21 SEPTIC SHOCK (HCC): Status: ACTIVE | Noted: 2022-11-05

## 2022-11-05 PROBLEM — K52.9 GASTROENTERITIS: Status: ACTIVE | Noted: 2022-11-05

## 2022-11-05 PROBLEM — D69.6 THROMBOCYTOPENIA (HCC): Status: ACTIVE | Noted: 2022-11-05

## 2022-11-05 LAB
ALBUMIN SERPL BCP-MCNC: 2.9 G/DL (ref 3.2–4.9)
ALBUMIN/GLOB SERPL: 0.7 G/DL
ALP SERPL-CCNC: 108 U/L (ref 30–99)
ALT SERPL-CCNC: 35 U/L (ref 2–50)
AMORPH CRY #/AREA URNS HPF: PRESENT /HPF
ANION GAP SERPL CALC-SCNC: 15 MMOL/L (ref 7–16)
ANION GAP SERPL CALC-SCNC: 16 MMOL/L (ref 7–16)
ANION GAP SERPL CALC-SCNC: 16 MMOL/L (ref 7–16)
ANION GAP SERPL CALC-SCNC: 20 MMOL/L (ref 7–16)
APPEARANCE UR: ABNORMAL
AST SERPL-CCNC: 48 U/L (ref 12–45)
BACTERIA #/AREA URNS HPF: NEGATIVE /HPF
BASOPHILS # BLD AUTO: 0.8 % (ref 0–1.8)
BASOPHILS # BLD: 0.09 K/UL (ref 0–0.12)
BILIRUB SERPL-MCNC: 3.5 MG/DL (ref 0.1–1.5)
BILIRUB UR QL STRIP.AUTO: ABNORMAL
BUN SERPL-MCNC: 63 MG/DL (ref 8–22)
BUN SERPL-MCNC: 63 MG/DL (ref 8–22)
BUN SERPL-MCNC: 66 MG/DL (ref 8–22)
BUN SERPL-MCNC: 67 MG/DL (ref 8–22)
C DIFF DNA SPEC QL NAA+PROBE: NEGATIVE
C DIFF TOX GENS STL QL NAA+PROBE: NEGATIVE
CALCIUM SERPL-MCNC: 8.1 MG/DL (ref 8.5–10.5)
CALCIUM SERPL-MCNC: 8.5 MG/DL (ref 8.5–10.5)
CALCIUM SERPL-MCNC: 8.5 MG/DL (ref 8.5–10.5)
CALCIUM SERPL-MCNC: 8.9 MG/DL (ref 8.5–10.5)
CHLORIDE SERPL-SCNC: 88 MMOL/L (ref 96–112)
CHLORIDE SERPL-SCNC: 91 MMOL/L (ref 96–112)
CHLORIDE SERPL-SCNC: 94 MMOL/L (ref 96–112)
CHLORIDE SERPL-SCNC: 94 MMOL/L (ref 96–112)
CO2 SERPL-SCNC: 14 MMOL/L (ref 20–33)
CO2 SERPL-SCNC: 15 MMOL/L (ref 20–33)
CO2 SERPL-SCNC: 16 MMOL/L (ref 20–33)
CO2 SERPL-SCNC: 16 MMOL/L (ref 20–33)
COLOR UR: ABNORMAL
CREAT SERPL-MCNC: 3.13 MG/DL (ref 0.5–1.4)
CREAT SERPL-MCNC: 3.61 MG/DL (ref 0.5–1.4)
CREAT SERPL-MCNC: 3.71 MG/DL (ref 0.5–1.4)
CREAT SERPL-MCNC: 3.8 MG/DL (ref 0.5–1.4)
EKG IMPRESSION: NORMAL
EOSINOPHIL # BLD AUTO: 0 K/UL (ref 0–0.51)
EOSINOPHIL NFR BLD: 0 % (ref 0–6.9)
EPI CELLS #/AREA URNS HPF: ABNORMAL /HPF
ERYTHROCYTE [DISTWIDTH] IN BLOOD BY AUTOMATED COUNT: 46.3 FL (ref 35.9–50)
FLUAV RNA SPEC QL NAA+PROBE: NEGATIVE
FLUBV RNA SPEC QL NAA+PROBE: NEGATIVE
GFR SERPLBLD CREATININE-BSD FMLA CKD-EPI: 17 ML/MIN/1.73 M 2
GFR SERPLBLD CREATININE-BSD FMLA CKD-EPI: 18 ML/MIN/1.73 M 2
GFR SERPLBLD CREATININE-BSD FMLA CKD-EPI: 18 ML/MIN/1.73 M 2
GFR SERPLBLD CREATININE-BSD FMLA CKD-EPI: 22 ML/MIN/1.73 M 2
GLOBULIN SER CALC-MCNC: 4.1 G/DL (ref 1.9–3.5)
GLUCOSE SERPL-MCNC: 109 MG/DL (ref 65–99)
GLUCOSE SERPL-MCNC: 111 MG/DL (ref 65–99)
GLUCOSE SERPL-MCNC: 116 MG/DL (ref 65–99)
GLUCOSE SERPL-MCNC: 145 MG/DL (ref 65–99)
GLUCOSE UR STRIP.AUTO-MCNC: 100 MG/DL
GRAN CASTS #/AREA URNS LPF: ABNORMAL /LPF
HCT VFR BLD AUTO: 36.3 % (ref 42–52)
HGB BLD-MCNC: 12.2 G/DL (ref 14–18)
KETONES UR STRIP.AUTO-MCNC: ABNORMAL MG/DL
LACTATE SERPL-SCNC: 2.4 MMOL/L (ref 0.5–2)
LACTATE SERPL-SCNC: 2.9 MMOL/L (ref 0.5–2)
LACTATE SERPL-SCNC: 3.5 MMOL/L (ref 0.5–2)
LACTATE SERPL-SCNC: 3.7 MMOL/L (ref 0.5–2)
LACTATE SERPL-SCNC: 4.3 MMOL/L (ref 0.5–2)
LEUKOCYTE ESTERASE UR QL STRIP.AUTO: ABNORMAL
LIPASE SERPL-CCNC: 20 U/L (ref 11–82)
LV EJECT FRACT  99904: 30
LV EJECT FRACT MOD 2C 99903: 36.12
LV EJECT FRACT MOD 4C 99902: 41.24
LV EJECT FRACT MOD BP 99901: 39.3
LYMPHOCYTES # BLD AUTO: 0.3 K/UL (ref 1–4.8)
LYMPHOCYTES NFR BLD: 2.6 % (ref 22–41)
MANUAL DIFF BLD: NORMAL
MCH RBC QN AUTO: 30 PG (ref 27–33)
MCHC RBC AUTO-ENTMCNC: 33.6 G/DL (ref 33.7–35.3)
MCV RBC AUTO: 89.2 FL (ref 81.4–97.8)
MICRO URNS: ABNORMAL
MONOCYTES # BLD AUTO: 0.49 K/UL (ref 0–0.85)
MONOCYTES NFR BLD AUTO: 4.3 % (ref 0–13.4)
MORPHOLOGY BLD-IMP: NORMAL
NEUTROPHILS # BLD AUTO: 10.52 K/UL (ref 1.82–7.42)
NEUTROPHILS NFR BLD: 88.9 % (ref 44–72)
NEUTS BAND NFR BLD MANUAL: 3.4 % (ref 0–10)
NITRITE UR QL STRIP.AUTO: POSITIVE
NRBC # BLD AUTO: 0 K/UL
NRBC BLD-RTO: 0 /100 WBC
NT-PROBNP SERPL IA-MCNC: ABNORMAL PG/ML (ref 0–125)
PH UR STRIP.AUTO: 5 [PH] (ref 5–8)
PLATELET # BLD AUTO: 99 K/UL (ref 164–446)
PLATELET BLD QL SMEAR: NORMAL
PMV BLD AUTO: 13.3 FL (ref 9–12.9)
POIKILOCYTOSIS BLD QL SMEAR: NORMAL
POTASSIUM SERPL-SCNC: 3.1 MMOL/L (ref 3.6–5.5)
POTASSIUM SERPL-SCNC: 3.1 MMOL/L (ref 3.6–5.5)
POTASSIUM SERPL-SCNC: 3.4 MMOL/L (ref 3.6–5.5)
POTASSIUM SERPL-SCNC: 3.6 MMOL/L (ref 3.6–5.5)
PROCALCITONIN SERPL-MCNC: 23 NG/ML
PROT SERPL-MCNC: 7 G/DL (ref 6–8.2)
PROT UR QL STRIP: 30 MG/DL
RBC # BLD AUTO: 4.07 M/UL (ref 4.7–6.1)
RBC # URNS HPF: ABNORMAL /HPF
RBC BLD AUTO: PRESENT
RBC UR QL AUTO: NEGATIVE
RSV RNA SPEC QL NAA+PROBE: NEGATIVE
SARS-COV-2 RNA RESP QL NAA+PROBE: NOTDETECTED
SCCMEC + MECA PNL NOSE NAA+PROBE: POSITIVE
SODIUM SERPL-SCNC: 122 MMOL/L (ref 135–145)
SODIUM SERPL-SCNC: 123 MMOL/L (ref 135–145)
SODIUM SERPL-SCNC: 124 MMOL/L (ref 135–145)
SODIUM SERPL-SCNC: 126 MMOL/L (ref 135–145)
SP GR UR STRIP.AUTO: 1.02
SPECIMEN SOURCE: NORMAL
SPHEROCYTES BLD QL SMEAR: NORMAL
TROPONIN T SERPL-MCNC: 67 NG/L (ref 6–19)
UROBILINOGEN UR STRIP.AUTO-MCNC: 1 MG/DL
WBC # BLD AUTO: 11.4 K/UL (ref 4.8–10.8)
WBC #/AREA URNS HPF: ABNORMAL /HPF

## 2022-11-05 PROCEDURE — 73080 X-RAY EXAM OF ELBOW: CPT | Mod: LT

## 2022-11-05 PROCEDURE — 96368 THER/DIAG CONCURRENT INF: CPT

## 2022-11-05 PROCEDURE — 303105 HCHG CATHETER EXTRA

## 2022-11-05 PROCEDURE — 700111 HCHG RX REV CODE 636 W/ 250 OVERRIDE (IP): Performed by: EMERGENCY MEDICINE

## 2022-11-05 PROCEDURE — 700111 HCHG RX REV CODE 636 W/ 250 OVERRIDE (IP): Performed by: INTERNAL MEDICINE

## 2022-11-05 PROCEDURE — 84145 PROCALCITONIN (PCT): CPT

## 2022-11-05 PROCEDURE — 700105 HCHG RX REV CODE 258: Performed by: EMERGENCY MEDICINE

## 2022-11-05 PROCEDURE — 93306 TTE W/DOPPLER COMPLETE: CPT

## 2022-11-05 PROCEDURE — 0241U HCHG SARS-COV-2 COVID-19 NFCT DS RESP RNA 4 TRGT MIC: CPT

## 2022-11-05 PROCEDURE — 51702 INSERT TEMP BLADDER CATH: CPT

## 2022-11-05 PROCEDURE — 84484 ASSAY OF TROPONIN QUANT: CPT

## 2022-11-05 PROCEDURE — 85007 BL SMEAR W/DIFF WBC COUNT: CPT

## 2022-11-05 PROCEDURE — 700117 HCHG RX CONTRAST REV CODE 255

## 2022-11-05 PROCEDURE — 700105 HCHG RX REV CODE 258: Performed by: STUDENT IN AN ORGANIZED HEALTH CARE EDUCATION/TRAINING PROGRAM

## 2022-11-05 PROCEDURE — 96366 THER/PROPH/DIAG IV INF ADDON: CPT

## 2022-11-05 PROCEDURE — 81001 URINALYSIS AUTO W/SCOPE: CPT

## 2022-11-05 PROCEDURE — 700101 HCHG RX REV CODE 250: Performed by: EMERGENCY MEDICINE

## 2022-11-05 PROCEDURE — 80053 COMPREHEN METABOLIC PANEL: CPT

## 2022-11-05 PROCEDURE — 36556 INSERT NON-TUNNEL CV CATH: CPT

## 2022-11-05 PROCEDURE — 93005 ELECTROCARDIOGRAM TRACING: CPT | Performed by: EMERGENCY MEDICINE

## 2022-11-05 PROCEDURE — 770022 HCHG ROOM/CARE - ICU (200)

## 2022-11-05 PROCEDURE — A9270 NON-COVERED ITEM OR SERVICE: HCPCS | Performed by: STUDENT IN AN ORGANIZED HEALTH CARE EDUCATION/TRAINING PROGRAM

## 2022-11-05 PROCEDURE — 85025 COMPLETE CBC W/AUTO DIFF WBC: CPT

## 2022-11-05 PROCEDURE — C1751 CATH, INF, PER/CENT/MIDLINE: HCPCS | Performed by: EMERGENCY MEDICINE

## 2022-11-05 PROCEDURE — 99291 CRITICAL CARE FIRST HOUR: CPT | Mod: GC | Performed by: STUDENT IN AN ORGANIZED HEALTH CARE EDUCATION/TRAINING PROGRAM

## 2022-11-05 PROCEDURE — 71045 X-RAY EXAM CHEST 1 VIEW: CPT

## 2022-11-05 PROCEDURE — 87045 FECES CULTURE AEROBIC BACT: CPT

## 2022-11-05 PROCEDURE — 87899 AGENT NOS ASSAY W/OPTIC: CPT

## 2022-11-05 PROCEDURE — 700111 HCHG RX REV CODE 636 W/ 250 OVERRIDE (IP): Performed by: STUDENT IN AN ORGANIZED HEALTH CARE EDUCATION/TRAINING PROGRAM

## 2022-11-05 PROCEDURE — 74176 CT ABD & PELVIS W/O CONTRAST: CPT

## 2022-11-05 PROCEDURE — 80048 BASIC METABOLIC PNL TOTAL CA: CPT

## 2022-11-05 PROCEDURE — 87493 C DIFF AMPLIFIED PROBE: CPT

## 2022-11-05 PROCEDURE — 83880 ASSAY OF NATRIURETIC PEPTIDE: CPT

## 2022-11-05 PROCEDURE — 02HV33Z INSERTION OF INFUSION DEVICE INTO SUPERIOR VENA CAVA, PERCUTANEOUS APPROACH: ICD-10-PCS | Performed by: EMERGENCY MEDICINE

## 2022-11-05 PROCEDURE — 96367 TX/PROPH/DG ADDL SEQ IV INF: CPT

## 2022-11-05 PROCEDURE — 94760 N-INVAS EAR/PLS OXIMETRY 1: CPT

## 2022-11-05 PROCEDURE — 87641 MR-STAPH DNA AMP PROBE: CPT

## 2022-11-05 PROCEDURE — 87181 SC STD AGAR DILUTION PER AGT: CPT

## 2022-11-05 PROCEDURE — C1751 CATH, INF, PER/CENT/MIDLINE: HCPCS

## 2022-11-05 PROCEDURE — 87040 BLOOD CULTURE FOR BACTERIA: CPT

## 2022-11-05 PROCEDURE — 700102 HCHG RX REV CODE 250 W/ 637 OVERRIDE(OP): Performed by: STUDENT IN AN ORGANIZED HEALTH CARE EDUCATION/TRAINING PROGRAM

## 2022-11-05 PROCEDURE — 83690 ASSAY OF LIPASE: CPT

## 2022-11-05 PROCEDURE — 83605 ASSAY OF LACTIC ACID: CPT

## 2022-11-05 PROCEDURE — 96365 THER/PROPH/DIAG IV INF INIT: CPT

## 2022-11-05 PROCEDURE — 87077 CULTURE AEROBIC IDENTIFY: CPT

## 2022-11-05 PROCEDURE — 36415 COLL VENOUS BLD VENIPUNCTURE: CPT

## 2022-11-05 PROCEDURE — 87086 URINE CULTURE/COLONY COUNT: CPT

## 2022-11-05 PROCEDURE — 99291 CRITICAL CARE FIRST HOUR: CPT

## 2022-11-05 PROCEDURE — 93306 TTE W/DOPPLER COMPLETE: CPT | Mod: 26 | Performed by: INTERNAL MEDICINE

## 2022-11-05 PROCEDURE — 700101 HCHG RX REV CODE 250: Performed by: STUDENT IN AN ORGANIZED HEALTH CARE EDUCATION/TRAINING PROGRAM

## 2022-11-05 PROCEDURE — C9803 HOPD COVID-19 SPEC COLLECT: HCPCS | Performed by: EMERGENCY MEDICINE

## 2022-11-05 RX ORDER — NYSTATIN 100000 [USP'U]/G
POWDER TOPICAL 2 TIMES DAILY
Status: COMPLETED | OUTPATIENT
Start: 2022-11-05 | End: 2022-11-12

## 2022-11-05 RX ORDER — ACETAMINOPHEN 325 MG/1
650 TABLET ORAL EVERY 6 HOURS PRN
Status: DISCONTINUED | OUTPATIENT
Start: 2022-11-05 | End: 2022-11-17 | Stop reason: HOSPADM

## 2022-11-05 RX ORDER — POTASSIUM CHLORIDE 20 MEQ/1
40 TABLET, EXTENDED RELEASE ORAL EVERY 4 HOURS
Status: COMPLETED | OUTPATIENT
Start: 2022-11-05 | End: 2022-11-05

## 2022-11-05 RX ORDER — NOREPINEPHRINE BITARTRATE 0.03 MG/ML
0-30 INJECTION, SOLUTION INTRAVENOUS CONTINUOUS
Status: DISCONTINUED | OUTPATIENT
Start: 2022-11-05 | End: 2022-11-05

## 2022-11-05 RX ORDER — NOREPINEPHRINE BITARTRATE 0.03 MG/ML
.5-3 INJECTION, SOLUTION INTRAVENOUS CONTINUOUS
Status: DISCONTINUED | OUTPATIENT
Start: 2022-11-05 | End: 2022-11-08

## 2022-11-05 RX ORDER — SODIUM CHLORIDE 9 MG/ML
INJECTION, SOLUTION INTRAVENOUS CONTINUOUS
Status: DISCONTINUED | OUTPATIENT
Start: 2022-11-05 | End: 2022-11-05

## 2022-11-05 RX ORDER — AMIODARONE HYDROCHLORIDE 200 MG/1
200 TABLET ORAL DAILY
Status: DISCONTINUED | OUTPATIENT
Start: 2022-11-05 | End: 2022-11-05

## 2022-11-05 RX ORDER — POTASSIUM CHLORIDE 20 MEQ/1
20 TABLET, EXTENDED RELEASE ORAL ONCE
Status: DISCONTINUED | OUTPATIENT
Start: 2022-11-05 | End: 2022-11-05

## 2022-11-05 RX ORDER — TRAZODONE HYDROCHLORIDE 50 MG/1
50 TABLET ORAL NIGHTLY
COMMUNITY

## 2022-11-05 RX ORDER — ZIPRASIDONE HYDROCHLORIDE 40 MG/1
40 CAPSULE ORAL 2 TIMES DAILY
COMMUNITY

## 2022-11-05 RX ORDER — TRAZODONE HYDROCHLORIDE 50 MG/1
50 TABLET ORAL NIGHTLY
Status: DISCONTINUED | OUTPATIENT
Start: 2022-11-05 | End: 2022-11-05

## 2022-11-05 RX ORDER — POTASSIUM CHLORIDE 7.45 MG/ML
10 INJECTION INTRAVENOUS
Status: DISCONTINUED | OUTPATIENT
Start: 2022-11-05 | End: 2022-11-05

## 2022-11-05 RX ORDER — POTASSIUM CHLORIDE 14.9 MG/ML
20 INJECTION INTRAVENOUS ONCE
Status: COMPLETED | OUTPATIENT
Start: 2022-11-05 | End: 2022-11-05

## 2022-11-05 RX ORDER — LISINOPRIL 20 MG/1
20 TABLET ORAL DAILY
COMMUNITY
End: 2023-01-09

## 2022-11-05 RX ORDER — LEVOTHYROXINE SODIUM 0.07 MG/1
150 TABLET ORAL
Status: DISCONTINUED | OUTPATIENT
Start: 2022-11-05 | End: 2022-11-17 | Stop reason: HOSPADM

## 2022-11-05 RX ORDER — SODIUM CHLORIDE 9 MG/ML
1000 INJECTION, SOLUTION INTRAVENOUS ONCE
Status: COMPLETED | OUTPATIENT
Start: 2022-11-05 | End: 2022-11-05

## 2022-11-05 RX ORDER — ACETAMINOPHEN 325 MG/1
650 TABLET ORAL EVERY 6 HOURS PRN
COMMUNITY
End: 2023-06-22

## 2022-11-05 RX ORDER — PAROXETINE 30 MG/1
30 TABLET, FILM COATED ORAL DAILY
COMMUNITY

## 2022-11-05 RX ORDER — PAROXETINE 30 MG/1
30 TABLET, FILM COATED ORAL DAILY
Status: DISCONTINUED | OUTPATIENT
Start: 2022-11-05 | End: 2022-11-05

## 2022-11-05 RX ORDER — METOPROLOL SUCCINATE 25 MG/1
50 TABLET, EXTENDED RELEASE ORAL DAILY
COMMUNITY
End: 2022-11-29

## 2022-11-05 RX ORDER — POLYETHYLENE GLYCOL 3350 17 G/17G
1 POWDER, FOR SOLUTION ORAL
Status: DISCONTINUED | OUTPATIENT
Start: 2022-11-05 | End: 2022-11-14

## 2022-11-05 RX ORDER — ZIPRASIDONE HYDROCHLORIDE 40 MG/1
40 CAPSULE ORAL 2 TIMES DAILY
Status: DISCONTINUED | OUTPATIENT
Start: 2022-11-05 | End: 2022-11-05

## 2022-11-05 RX ORDER — SODIUM CHLORIDE 9 MG/ML
500 INJECTION, SOLUTION INTRAVENOUS ONCE
Status: COMPLETED | OUTPATIENT
Start: 2022-11-05 | End: 2022-11-05

## 2022-11-05 RX ORDER — SODIUM CHLORIDE, SODIUM LACTATE, POTASSIUM CHLORIDE, CALCIUM CHLORIDE 600; 310; 30; 20 MG/100ML; MG/100ML; MG/100ML; MG/100ML
500 INJECTION, SOLUTION INTRAVENOUS ONCE
Status: COMPLETED | OUTPATIENT
Start: 2022-11-05 | End: 2022-11-05

## 2022-11-05 RX ORDER — TRAZODONE HYDROCHLORIDE 50 MG/1
50 TABLET ORAL NIGHTLY
Status: DISCONTINUED | OUTPATIENT
Start: 2022-11-05 | End: 2022-11-17 | Stop reason: HOSPADM

## 2022-11-05 RX ORDER — ROPINIROLE 0.25 MG/1
0.5 TABLET, FILM COATED ORAL
COMMUNITY

## 2022-11-05 RX ORDER — HEPARIN SODIUM 5000 [USP'U]/ML
5000 INJECTION, SOLUTION INTRAVENOUS; SUBCUTANEOUS EVERY 8 HOURS
Status: DISCONTINUED | OUTPATIENT
Start: 2022-11-05 | End: 2022-11-05

## 2022-11-05 RX ORDER — SODIUM CHLORIDE, SODIUM LACTATE, POTASSIUM CHLORIDE, CALCIUM CHLORIDE 600; 310; 30; 20 MG/100ML; MG/100ML; MG/100ML; MG/100ML
1000 INJECTION, SOLUTION INTRAVENOUS ONCE
Status: COMPLETED | OUTPATIENT
Start: 2022-11-05 | End: 2022-11-05

## 2022-11-05 RX ORDER — AMOXICILLIN 250 MG
2 CAPSULE ORAL 2 TIMES DAILY
Status: DISCONTINUED | OUTPATIENT
Start: 2022-11-05 | End: 2022-11-14

## 2022-11-05 RX ORDER — METRONIDAZOLE 500 MG/100ML
500 INJECTION, SOLUTION INTRAVENOUS EVERY 8 HOURS
Status: DISCONTINUED | OUTPATIENT
Start: 2022-11-05 | End: 2022-11-07

## 2022-11-05 RX ORDER — M-VIT,TX,IRON,MINS/CALC/FOLIC 27MG-0.4MG
1 TABLET ORAL DAILY
COMMUNITY

## 2022-11-05 RX ORDER — BISACODYL 10 MG
10 SUPPOSITORY, RECTAL RECTAL
Status: DISCONTINUED | OUTPATIENT
Start: 2022-11-05 | End: 2022-11-14

## 2022-11-05 RX ORDER — POTASSIUM CHLORIDE 7.45 MG/ML
10 INJECTION INTRAVENOUS
Status: COMPLETED | OUTPATIENT
Start: 2022-11-05 | End: 2022-11-05

## 2022-11-05 RX ORDER — LEVOTHYROXINE SODIUM 0.07 MG/1
150 TABLET ORAL
Status: DISCONTINUED | OUTPATIENT
Start: 2022-11-05 | End: 2022-11-05

## 2022-11-05 RX ADMIN — AMIODARONE HYDROCHLORIDE 1 MG/MIN: 1.8 INJECTION, SOLUTION INTRAVENOUS at 16:23

## 2022-11-05 RX ADMIN — METRONIDAZOLE 500 MG: 500 INJECTION, SOLUTION INTRAVENOUS at 09:19

## 2022-11-05 RX ADMIN — NOREPINEPHRINE BITARTRATE 10 MCG/MIN: 1 INJECTION, SOLUTION, CONCENTRATE INTRAVENOUS at 10:40

## 2022-11-05 RX ADMIN — POTASSIUM CHLORIDE 10 MEQ: 7.46 INJECTION, SOLUTION INTRAVENOUS at 07:23

## 2022-11-05 RX ADMIN — LEVOTHYROXINE SODIUM 150 MCG: 0.07 TABLET ORAL at 11:00

## 2022-11-05 RX ADMIN — VANCOMYCIN HYDROCHLORIDE 3 G: 500 INJECTION, POWDER, LYOPHILIZED, FOR SOLUTION INTRAVENOUS at 06:58

## 2022-11-05 RX ADMIN — ACETAMINOPHEN 650 MG: 325 TABLET, FILM COATED ORAL at 11:14

## 2022-11-05 RX ADMIN — POTASSIUM CHLORIDE 10 MEQ: 7.46 INJECTION, SOLUTION INTRAVENOUS at 09:41

## 2022-11-05 RX ADMIN — HYDROCORTISONE SODIUM SUCCINATE 50 MG: 100 INJECTION, POWDER, FOR SOLUTION INTRAMUSCULAR; INTRAVENOUS at 23:40

## 2022-11-05 RX ADMIN — POTASSIUM CHLORIDE 40 MEQ: 1500 TABLET, EXTENDED RELEASE ORAL at 07:23

## 2022-11-05 RX ADMIN — AMIODARONE HYDROCHLORIDE 1 MG/MIN: 1.8 INJECTION, SOLUTION INTRAVENOUS at 21:35

## 2022-11-05 RX ADMIN — HYDROCORTISONE SODIUM SUCCINATE 50 MG: 100 INJECTION, POWDER, FOR SOLUTION INTRAMUSCULAR; INTRAVENOUS at 17:19

## 2022-11-05 RX ADMIN — VASOPRESSIN 0.03 UNITS/MIN: 20 INJECTION, SOLUTION INTRAMUSCULAR; SUBCUTANEOUS at 21:31

## 2022-11-05 RX ADMIN — POTASSIUM CHLORIDE 10 MEQ: 7.46 INJECTION, SOLUTION INTRAVENOUS at 08:43

## 2022-11-05 RX ADMIN — NOREPINEPHRINE BITARTRATE 5 MCG/MIN: 1 INJECTION, SOLUTION, CONCENTRATE INTRAVENOUS at 08:39

## 2022-11-05 RX ADMIN — AMIODARONE HYDROCHLORIDE 200 MG: 200 TABLET ORAL at 11:01

## 2022-11-05 RX ADMIN — POTASSIUM CHLORIDE 40 MEQ: 1500 TABLET, EXTENDED RELEASE ORAL at 10:57

## 2022-11-05 RX ADMIN — HYDROCORTISONE SODIUM SUCCINATE 50 MG: 100 INJECTION, POWDER, FOR SOLUTION INTRAMUSCULAR; INTRAVENOUS at 11:03

## 2022-11-05 RX ADMIN — SODIUM CHLORIDE 1000 ML: 9 INJECTION, SOLUTION INTRAVENOUS at 06:58

## 2022-11-05 RX ADMIN — APIXABAN 5 MG: 5 TABLET, FILM COATED ORAL at 17:12

## 2022-11-05 RX ADMIN — VASOPRESSIN 0.03 UNITS/MIN: 20 INJECTION, SOLUTION INTRAMUSCULAR; SUBCUTANEOUS at 11:43

## 2022-11-05 RX ADMIN — SODIUM CHLORIDE 500 ML: 9 INJECTION, SOLUTION INTRAVENOUS at 06:38

## 2022-11-05 RX ADMIN — HUMAN ALBUMIN MICROSPHERES AND PERFLUTREN 3 ML: 10; .22 INJECTION, SOLUTION INTRAVENOUS at 13:23

## 2022-11-05 RX ADMIN — SODIUM CHLORIDE: 9 INJECTION, SOLUTION INTRAVENOUS at 08:42

## 2022-11-05 RX ADMIN — METRONIDAZOLE 500 MG: 500 INJECTION, SOLUTION INTRAVENOUS at 13:55

## 2022-11-05 RX ADMIN — CEFEPIME 2 G: 2 INJECTION, POWDER, FOR SOLUTION INTRAVENOUS at 17:18

## 2022-11-05 RX ADMIN — METRONIDAZOLE 500 MG: 500 INJECTION, SOLUTION INTRAVENOUS at 21:36

## 2022-11-05 RX ADMIN — NOREPINEPHRINE BITARTRATE 15 MCG/MIN: 1 INJECTION, SOLUTION, CONCENTRATE INTRAVENOUS at 16:33

## 2022-11-05 RX ADMIN — SODIUM CHLORIDE, POTASSIUM CHLORIDE, SODIUM LACTATE AND CALCIUM CHLORIDE 1000 ML: 600; 310; 30; 20 INJECTION, SOLUTION INTRAVENOUS at 04:17

## 2022-11-05 RX ADMIN — POTASSIUM CHLORIDE 10 MEQ: 7.46 INJECTION, SOLUTION INTRAVENOUS at 10:52

## 2022-11-05 RX ADMIN — CEFEPIME 2 G: 2 INJECTION, POWDER, FOR SOLUTION INTRAVENOUS at 06:39

## 2022-11-05 RX ADMIN — NYSTATIN: 100000 POWDER TOPICAL at 17:19

## 2022-11-05 RX ADMIN — POTASSIUM CHLORIDE 20 MEQ: 14.9 INJECTION, SOLUTION INTRAVENOUS at 18:47

## 2022-11-05 RX ADMIN — AMIODARONE HYDROCHLORIDE 150 MG: 1.5 INJECTION, SOLUTION INTRAVENOUS at 15:55

## 2022-11-05 RX ADMIN — SODIUM CHLORIDE, POTASSIUM CHLORIDE, SODIUM LACTATE AND CALCIUM CHLORIDE 500 ML: 600; 310; 30; 20 INJECTION, SOLUTION INTRAVENOUS at 06:03

## 2022-11-05 RX ADMIN — APIXABAN 5 MG: 5 TABLET, FILM COATED ORAL at 10:15

## 2022-11-05 ASSESSMENT — ENCOUNTER SYMPTOMS
WHEEZING: 0
HEADACHES: 0
PALPITATIONS: 0
DIARRHEA: 1
VOMITING: 0
SHORTNESS OF BREATH: 0
BLOOD IN STOOL: 0
WEAKNESS: 1
DOUBLE VISION: 0
LOSS OF CONSCIOUSNESS: 0
NAUSEA: 0
DIZZINESS: 0
COUGH: 0
CHILLS: 1
ABDOMINAL PAIN: 0
MYALGIAS: 0
SPUTUM PRODUCTION: 0
FEVER: 1
CONSTIPATION: 0

## 2022-11-05 ASSESSMENT — FIBROSIS 4 INDEX
FIB4 SCORE: 0.79
FIB4 SCORE: 4.84

## 2022-11-05 ASSESSMENT — LIFESTYLE VARIABLES: SUBSTANCE_ABUSE: 0

## 2022-11-05 NOTE — PROGRESS NOTES
Temp is 39.4C per frye catheter. No response to Tylenol.   No urine output yet in the frye.   A fib 120's-130.   Levo is at 20, Vaso started at 0.03.   Cooling blanket initiated per Dr Reveles.

## 2022-11-05 NOTE — CONSULTS
Critical Care/Pulmonary Consultation    Date of Service: 11/5/2022    Date of Admission:  11/5/2022  3:11 AM    Consulting Physician: Mio Reveles M.D.    Chief Complaint:  Weakness (The pt is coming from [facility]. Staff and pt report weakness with tremors x2 days. No fever, bp 88/60. 500ml LR was given. The pt afib on the monitor at 115. Breathsounds bilaterally equal. Pt reports one episode of diarrhea. Hx of a-fib, CHF, Diabetes. 22g left forearm)      History of Present Illness: Sebastián Castro is a 57 y.o. male with a past medical history of nonischemic cardiomyopathy, permanent A. fib on amiodarone and Eliquis, non-insulin-dependent type 2 diabetes mellitus, and prior hospitalization for bowel perforation status post exploratory laparotomy is presenting with rigors.  Patient states for the past 4 days, he has had watery diarrhea with rigors, fevers and has lived in a skilled nursing facility for the past year.  Patient continues to have generalized weakness but denies any lightheadedness, dizziness, vision changes.  Patient denies any cough or sputum production, denies any new rashes, however does have a chronic left heel wound in the presence of diabetes history.    In ED: Temperature 36.9 °C, pulse 120, respirate 44, blood pressure 97/37, oxygen saturation 92% on 2 L nasal cannula.  Labs: WBC 11.4, platelets 97, sodium 123, potassium 3.1, creatinine 3.13, BUN 63, urinalysis positive for nitrite, lactic acid 4.3, BNP 33,293  Chest x-ray: Hazy right lower lobe opacity possible pneumonia versus scar  CT abdomen pelvis: No evidence of bowel obstruction or focal inflammatory changes, no gallstones, with mildly distended colon with fluid    Review of Systems   Constitutional:  Positive for chills and fever.   HENT:  Negative for congestion.    Eyes:  Negative for double vision.   Respiratory:  Negative for cough, sputum production, shortness of breath and wheezing.    Cardiovascular:  Negative for  chest pain and palpitations.   Gastrointestinal:  Positive for diarrhea. Negative for abdominal pain, blood in stool, constipation, melena, nausea and vomiting.   Genitourinary:  Negative for dysuria.   Musculoskeletal:  Negative for myalgias.   Skin:  Negative for rash.   Neurological:  Positive for weakness. Negative for dizziness, loss of consciousness and headaches.   Psychiatric/Behavioral:  Negative for substance abuse.      Home Medications       Reviewed by James Jaffe (Pharmacy Tech) on 11/05/22 at 0636  Med List Status: Complete     Medication Last Dose Status   acetaminophen (TYLENOL) 325 MG Tab 8/23/2022 Active   amiodarone (CORDARONE) 200 MG Tab 11/4/2022 Active   apixaban (ELIQUIS) 5mg Tab 11/4/2022 Active   clonazePAM (KLONOPIN) 0.5 MG Tab 11/4/2022 Active   levothyroxine (SYNTHROID) 150 MCG Tab 11/4/2022 Active   lisinopril (PRINIVIL) 20 MG Tab 11/4/2022 Active   magnesium oxide (MAG-OX) 400 MG Tab tablet 11/4/2022 Active   metoprolol SR (TOPROL XL) 25 MG TABLET SR 24 HR 11/4/2022 Active   omeprazole (PRILOSEC) 40 MG delayed-release capsule 11/4/2022 Active   PARoxetine (PAXIL) 30 MG Tab 11/4/2022 Active   psyllium (METAMUCIL/KONSYL) Pack 11/4/2022 Active   ROPINIRole (REQUIP) 0.25 MG Tab 11/4/2022 Active   tamsulosin (FLOMAX) 0.4 MG capsule 11/4/2022 Active   therapeutic multivitamin-minerals (THERAGRAN-M) Tab 11/4/2022 Active   traZODone (DESYREL) 50 MG Tab 11/4/2022 Active   vitamin D (VITAMIND D3) 1000 UNIT Tab 11/4/2022 Active   ziprasidone (GEODON) 40 MG Cap 11/4/2022 Active                    Social History     Tobacco Use    Smoking status: Never    Smokeless tobacco: Never   Vaping Use    Vaping Use: Never used   Substance Use Topics    Alcohol use: No    Drug use: No        Past Medical History:   Diagnosis Date    A-fib (HCC)     Abscess 11/8/2020    Bacteremia 11/8/2020    Chest pain     Congestive heart failure (HCC)     Diabetes (HCC)     Diabetic neuropathy (HCC)      Hypercholesterolemia     Hypertension     Longstanding persistent atrial fibrillation ([facility]) 11/8/2020    LV dysfunction 11/8/2020    Morbid obesity ([facility])     NICM (nonischemic cardiomyopathy) ([facility]) 11/8/2020    Noncompliance     Normal cardiac stress test     Orthostatic hypotension     Sepsis ([facility]) 11/8/2020       Past Surgical History:   Procedure Laterality Date    DC EXPLORATORY OF ABDOMEN  5/27/2021    Procedure: LAPAROTOMY, EXPLORATORY;  Surgeon: Kin Desouza D.O.;  Location: [facility];  Service: General    COLOSTOMY TAKEDOWN  5/27/2021    Procedure: COLOSTOMY TAKEDOWN AND CLOSURE;  Surgeon: Kin Desouza D.O.;  Location: [facility];  Service: General    COLECTOMY N/A 11/10/2020    Procedure: COLECTOMY-SEGMENTAL, COLOSTOMY, MOBILIZATION OF SPLENIC FLEXURE, WOUND VAC PLACEMENT, IRRIGATION AND DEBRIDMENT FLANK WOUND;  Surgeon: Kin Desouza D.O.;  Location: [facility];  Service: General    ZZZ CARDIAC CATH  07/21/2018    EF 45%, normal coronaries.    IRRIGATION & DEBRIDEMENT ORTHO Right 2/19/2018    Procedure: IRRIGATION & DEBRIDEMENT ORTHO-FOOT;  Surgeon: Kirby Lopez M.D.;  Location: [facility];  Service: Orthopedics    TOE AMPUTATION Right 1/17/2018    Procedure: TOE AMPUTATION-1ST RAY;  Surgeon: Doug Delong M.D.;  Location: [facility];  Service: Orthopedics    TOE AMPUTATION Right 11/10/2017    Procedure: TOE AMPUTATION;  Surgeon: Osorio Leo M.D.;  Location: [facility];  Service: Orthopedics       Allergies: Zosyn [piperacillin sod-tazobactam so], Daptomycin, Pcn [penicillins], Unasyn [ampicillin-sulbactam sodium], and Vancomycin    Family History   Problem Relation Age of Onset    No Known Problems Mother     No Known Problems Father        Vitals:    11/05/22 0318 11/05/22 0322 11/05/22 0420 11/05/22 0500   Height: 1.829 m (6')      Weight: (!) 132 kg (290 lb)      Weight % change since last entry.: 0 %      BP:  (!) 96/50  (!) 96/52 (!) 90/56   Pulse:  (!) 117 (!) 125 (!) 120   BMI (Calculated): 39.33      Resp:  20 (!) 31 (!) 31   Temp:  36.9 °C (98.4 °F)     TempSrc:  Temporal      11/05/22 0530 11/05/22 0535 11/05/22 0545 11/05/22 0600   Height:       Weight:       Weight % change since last entry.:       BP: (!) 74/44 (!) 66/36 (!) 81/43 (!) 79/37   Pulse: (!) 117 (!) 118 (!) 115 (!) 116   BMI (Calculated):       Resp: (!) 42 (!) 57 (!) 44 (!) 28   Temp:       TempSrc:        11/05/22 0615 11/05/22 0630 11/05/22 0645 11/05/22 0705   Height:       Weight:       Weight % change since last entry.:       BP: (!) 87/43 100/44 (!) 97/37 (!) 81/51   Pulse: (!) 117 (!) 118 (!) 120 (!) 120   BMI (Calculated):       Resp: (!) 32 (!) 44 (!) 22 (!) 29   Temp:       TempSrc:        11/05/22 0720 11/05/22 0727 11/05/22 0847 11/05/22 0912   Height:       Weight:       Weight % change since last entry.:       BP: 109/56 107/73 (!) 90/52 129/53   Pulse: (!) 117 (!) 115 (!) 122 (!) 118   BMI (Calculated):       Resp: (!) 45 (!) 33 (!) 32 (!) 30   Temp:       TempSrc:        11/05/22 0926 11/05/22 0940   Height:     Weight:     Weight % change since last entry.:     BP: (!) 82/39 (!) 90/52   Pulse: (!) 117 (!) 115   BMI (Calculated):     Resp: (!) 24 (!) 27   Temp:     TempSrc:         Physical Examination    Physical Exam  Vitals and nursing note reviewed.   Constitutional:       General: He is not in acute distress.  HENT:      Head: Normocephalic.      Mouth/Throat:      Mouth: Mucous membranes are dry.   Eyes:      General: No scleral icterus.     Extraocular Movements: Extraocular movements intact.      Pupils: Pupils are equal, round, and reactive to light.   Cardiovascular:      Rate and Rhythm: Tachycardia present. Rhythm irregular.      Heart sounds: No murmur heard.    No friction rub. No gallop.   Pulmonary:      Breath sounds: No wheezing, rhonchi or rales.   Abdominal:      General: Abdomen is flat. A surgical scar is present. Bowel  sounds are normal. There is no distension.      Palpations: Abdomen is soft.      Tenderness: There is no abdominal tenderness.      Comments: Central abdominal scar. No suprapubic tenderness   Musculoskeletal:         General: No swelling or tenderness.      Cervical back: Neck supple.   Skin:     General: Skin is warm.   Neurological:      Mental Status: He is alert.      Cranial Nerves: No cranial nerve deficit.      Motor: Weakness present.   Psychiatric:         Mood and Affect: Mood normal.          Intake/Output Summary (Last 24 hours) at 11/5/2022 0958  Last data filed at 11/5/2022 0700  Gross per 24 hour   Intake 2000 ml   Output --   Net 2000 ml       Recent Labs     11/05/22 0326 11/05/22 0414   WBC 11.4*  --    NEUTSPOLYS 88.90*  --    LYMPHOCYTES 2.60*  --    MONOCYTES 4.30  --    EOSINOPHILS 0.00  --    BASOPHILS 0.80  --    ASTSGOT  --  48*   ALTSGPT  --  35   ALKPHOSPHAT  --  108*   TBILIRUBIN  --  3.5*     Recent Labs     11/05/22 0414 11/05/22  0840   SODIUM 123* 126*   POTASSIUM 3.1* 3.1*   CHLORIDE 91* 94*   CO2 16* 16*   BUN 63* 63*   CREATININE 3.13* 3.61*   CALCIUM 8.9 8.1*     Recent Labs     11/05/22 0326 11/05/22 0414 11/05/22  0840   ALTSGPT  --  35  --    ASTSGOT  --  48*  --    ALKPHOSPHAT  --  108*  --    TBILIRUBIN  --  3.5*  --    LIPASE 20  --   --    GLUCOSE  --  116* 109*         CT-ABDOMEN-PELVIS W/O   Final Result      1.  No evidence of bowel obstruction or focal inflammatory change.      2.  Gallstones.      3.  Mildly distended colon which also contains fluid. No evidence of bowel obstruction.      DX-CHEST-PORTABLE (1 VIEW)   Final Result      1.  Cardiomegaly. There is also interstitial edema.      2.  Right IJ central line present without evidence of complication.      3.  Linear atelectasis within the right lung base.      DX-CHEST-PORTABLE (1 VIEW)   Final Result      1.  Hazy opacity at the right lower lung, similar to 5/25/2021 exam. Scarring versus pneumonia.       EC-ECHOCARDIOGRAM COMPLETE W/O CONT    (Results Pending)       Patient Active Problem List   Diagnosis    Hyponatremia    Hypokalemia    Obesity due to excess calories with serious comorbidity    Hypomagnesemia    Lower limb amputation, great toe (HCC)    Mixed hyperlipidemia    Nonischemic cardiomyopathy (HCC)    Venous stasis dermatitis of both lower extremities    Psychogenic nonepileptic seizure    Orthostatic hypotension    Lumbar compression fracture (HCC)    Fall from ground level    Suicidal ideation    Debility    Adrenal insufficiency (HCC)    Acute renal failure (HCC)    Hypothyroidism    Colostomy present (HCC)    AF (atrial fibrillation) ([facility])    Major depressive disorder, recurrent, severe with psychotic features (HCC)    Type 2 diabetes mellitus with hyperglycemia, with long-term current use of insulin (HCC)    Chronic heart failure with preserved ejection fraction (HCC)    Chronic venous insufficiency of lower extremity    Permanent atrial fibrillation (HCC)    Lactic acidosis    Gram-negative bacteremia    Obstructive uropathy    Leukocytosis    Urinary tract infection    Normocytic anemia    Diarrhea    Obesity (BMI 30-39.9)    Gastroenteritis    Septic shock ([facility])    Thrombocytopenia ([facility])       Assessment and Plan:    * Septic shock ([facility])  Assessment & Plan  This is Septic shock Present on admission  SIRS criteria identified on my evaluation include: Tachycardia, with heart rate greater than 90 BPM and Tachypnea, with respirations greater than 20 per minute  Indicators of septic shock include: Severe sepsis present, persistent hypotension after 30 ml/kg completed, and initial lactate level result is >= 4 mmol/L.   Sources is: urine, possible abdominal  Sepsis protocol initiated  Crystalloid Fluid Administration: Fluid resuscitation ordered per standard protocol - 30 mL/kg per current or ideal body weight  IV antibiotics as appropriate for source of sepsis  Reassessment: I have reassessed the  patient's hemodynamic status    - Follow urine and blood cultures  - Possible abdominal source given severity of septic shock with minimal bacteria in the urinalysis and unremarkable chest x-ray  - Previous nonischemic cardiomyopathy, however last echo resolved and patient appears hypovolemic based off of bedside ultrasound  - Empiric cefepime and Flagyl  - Previous urine cultures growing Klebsiella and Enterococcus pansensitive  - CT abdomen pelvis with contrast to further evaluate abdominal etiology  - Levophed, vasopressin for MAP goal greater than 65  - Continues to be hypotensive, can add stress dose steroids (possible hx of adrenal insufficiency?)    Thrombocytopenia (HCC)  Assessment & Plan  Likely reactive secondary to sepsis, trend  - No active signs of bleeding  - Added HIV and hep panel    Lactic acidosis  Assessment & Plan  Elevated in the presence of septic shock  - Trend, fluid resuscitation    Permanent atrial fibrillation (HCC)  Assessment & Plan  Continue home eliquis and amiodarone    Hypothyroidism  Assessment & Plan  Continue home synthroid    Acute renal failure (HCC)  Assessment & Plan  Creatinine 3.13, baseline 1.07  - Likely hypovolemic/prerenal  - Bedside ultrasound revealing hypovolemia with collapsible central veins  - Fluid resuscitation  - Trend    Nonischemic cardiomyopathy (HCC)  Assessment & Plan  History of nonischemic cardiomyopathy, unclear etiology however previously reduced ejection fraction-resolved  - Last echo 2021 with EF of 55%  - Repeat echocardiogram ordered    Hypokalemia  Assessment & Plan  Monitor and replace as needed, goal >4  - Caution in acute renal failure    Hyponatremia  Assessment & Plan  Calculated serum osmolality 75, hypotonic likely in the setting of hypovolemia  - Patient received septic bolus  - Fluid resuscitation, trend  - Ensure not to overcorrect sodium, 6 to 8 mEq over 24 hours  - BMP every 6 hours    Obesity due to excess calories with serious  comorbidity  Assessment & Plan  Outpatient follow up       Quality measures:  Feeding:  Diabetic  Analgesia: n/a  Sedation: n/a  Thromboprophylaxis: Eliquis for afib  Head of bed: >30 degrees  Ulcer prophylaxis: n/a  Glycemic control: sliding scale  Bowel care: Bowel regimen  Indwelling lines: PIV  Deescalation of antibiotics: Cefepime with flagyl      CODE STATUS:   Full code  DISPO:    Pending septic shock  treatment and pressor requirements. Anticipate several days in the ICU.      Sony Bruno D.O.  Internal Medicine Resident  [facility]

## 2022-11-05 NOTE — PROGRESS NOTES
Lab called with   Growth detected by Bactec instrument. 11/05/2022  14:27   Gram Stain: Gram positive cocci: Possible Streptococcus sp    Dr Reveles notified

## 2022-11-05 NOTE — ED PROVIDER NOTES
ED Provider Note    CHIEF COMPLAINT  Chief Complaint   Patient presents with    Weakness     The pt is coming from [facility]. Staff and pt report weakness with tremors x2 days. No fever, bp 88/60. 500ml LR was given. The pt afib on the monitor at 115. Breathsounds bilaterally equal. Pt reports one episode of diarrhea. Hx of a-fib, CHF, Diabetes. 22g left forearm       HPI  Sebastián Castro is a 57 y.o. male who presents to the emergency department from his SNF for generalized weakness, tremor versus rigors.  Patient states this been going on for a few days.  Generalized weakness.  Decreased appetite.  Noted today to have low blood pressure and high heart rate.  Patient states he had 4-5 episodes of diarrhea today as well.  Mild abdominal cramping.  No vomiting.  No chest pain, shortness of breath.  No cough, congestion, sore throat or ear pain.    Compliant with medications at SNF.  Denies fall or trauma.    REVIEW OF SYSTEMS  See HPI for further details. All other systems are negative.     PAST MEDICAL HISTORY   has a past medical history of A-fib ([facility]), Abscess (11/8/2020), Bacteremia (11/8/2020), Chest pain, Congestive heart failure ([facility]), Diabetes ([facility]), Diabetic neuropathy ([facility]), Hypercholesterolemia, Hypertension, Longstanding persistent atrial fibrillation ([facility]) (11/8/2020), LV dysfunction (11/8/2020), Morbid obesity ([facility]), NICM (nonischemic cardiomyopathy) ([facility]) (11/8/2020), Noncompliance, Normal cardiac stress test, Orthostatic hypotension, and Sepsis ([facility]) (11/8/2020).    SOCIAL HISTORY  Social History     Tobacco Use    Smoking status: Never    Smokeless tobacco: Never   Vaping Use    Vaping Use: Never used   Substance and Sexual Activity    Alcohol use: No    Drug use: No    Sexual activity: Not on file       SURGICAL HISTORY   has a past surgical history that includes toe amputation (Right, 11/10/2017); toe amputation (Right, 1/17/2018); irrigation & debridement ortho (Right, 2/19/2018); zzz  cardiac cath (07/21/2018); colectomy (N/A, 11/10/2020); exploratory of abdomen (5/27/2021); and colostomy takedown (5/27/2021).    CURRENT MEDICATIONS  Home Medications    **Home medications have not yet been reviewed for this encounter**         ALLERGIES  Allergies   Allergen Reactions    Zosyn [Piperacillin Sod-Tazobactam So] Rash and Itching     Redness/flushed throughout body.     Daptomycin Rash     Body rash  RXN=1/2018      Pcn [Penicillins] Hives and Rash      Rash & hives  RXN=since a kid  Tolerates cephalosporins    Unasyn [Ampicillin-Sulbactam Sodium] Hives, Itching and Swelling    Vancomycin Rash     Red man syndrome. Tolerates IV vancomycin at reduced infusion rate.       PHYSICAL EXAM  VITAL SIGNS: BP (!) 96/50   Pulse (!) 117   Temp 36.9 °C (98.4 °F) (Temporal)   Resp 20   Ht 1.829 m (6')   Wt (!) 132 kg (290 lb)   SpO2 93%   BMI 39.33 kg/m²   Pulse ox interpretation: I interpret this pulse ox as normal.  Constitutional: Alert in no apparent distress.  HENT: Normocephalic, atraumatic. Bilateral external ears normal, Nose normal.  Dry mucous membranes.    Eyes: Pupils are equal and reactive, Conjunctiva normal.   Neck: Normal range of motion, Supple  Lymphatic: No lymphadenopathy noted.   Cardiovascular: Mild tachycardia, irregularly irregular rhythm, no murmurs. Distal pulses intact.  Trace nonpitting peripheral edema.  Thorax & Lungs: Normal breath sounds.  No wheezing/rales/ronchi.  Mild tachypnea without other increased work of breathing, clip speech or retractions.  Abdomen: Obese, soft, nondistended.  No apparent tenderness palpation.  No peritonitis.  Large abdominal wall defect, scarring.  Skin: Warm, Dry, No erythema, No rash.   Musculoskeletal: Good range of motion in all major joints. No major deformities noted.   Neurologic: Alert and oriented x4.  Moves 4 extremity spontaneously.  Speech is clear.  Psychiatric: Flat affect otherwise judgment normal, Mood normal.       DIAGNOSTIC  STUDIES / PROCEDURES    LABS  Results for orders placed or performed during the hospital encounter of 11/05/22   CBC With Differential   Result Value Ref Range    WBC 11.4 (H) 4.8 - 10.8 K/uL    RBC 4.07 (L) 4.70 - 6.10 M/uL    Hemoglobin 12.2 (L) 14.0 - 18.0 g/dL    Hematocrit 36.3 (L) 42.0 - 52.0 %    MCV 89.2 81.4 - 97.8 fL    MCH 30.0 27.0 - 33.0 pg    MCHC 33.6 (L) 33.7 - 35.3 g/dL    RDW 46.3 35.9 - 50.0 fL    Platelet Count 99 (L) 164 - 446 K/uL    MPV 13.3 (H) 9.0 - 12.9 fL    Neutrophils-Polys 88.90 (H) 44.00 - 72.00 %    Lymphocytes 2.60 (L) 22.00 - 41.00 %    Monocytes 4.30 0.00 - 13.40 %    Eosinophils 0.00 0.00 - 6.90 %    Basophils 0.80 0.00 - 1.80 %    Nucleated RBC 0.00 /100 WBC    Neutrophils (Absolute) 10.52 (H) 1.82 - 7.42 K/uL    Lymphs (Absolute) 0.30 (L) 1.00 - 4.80 K/uL    Monos (Absolute) 0.49 0.00 - 0.85 K/uL    Eos (Absolute) 0.00 0.00 - 0.51 K/uL    Baso (Absolute) 0.09 0.00 - 0.12 K/uL    NRBC (Absolute) 0.00 K/uL   Lipase   Result Value Ref Range    Lipase 20 11 - 82 U/L   Lactic Acid   Result Value Ref Range    Lactic Acid 3.7 (H) 0.5 - 2.0 mmol/L   Urinalysis    Specimen: Urine   Result Value Ref Range    Color DK Yellow     Character Turbid (A)     Specific Gravity 1.017 <1.035    Ph 5.0 5.0 - 8.0    Glucose 100 (A) Negative mg/dL    Ketones Trace (A) Negative mg/dL    Protein 30 (A) Negative mg/dL    Bilirubin Small (A) Negative    Urobilinogen, Urine 1.0 Negative    Nitrite Positive (A) Negative    Leukocyte Esterase Small (A) Negative    Occult Blood Negative Negative    Micro Urine Req Microscopic    proBrain Natriuretic Peptide, NT   Result Value Ref Range    NT-proBNP 51861 (H) 0 - 125 pg/mL   Comp Metabolic Panel   Result Value Ref Range    Sodium 123 (L) 135 - 145 mmol/L    Potassium 3.1 (L) 3.6 - 5.5 mmol/L    Chloride 91 (L) 96 - 112 mmol/L    Co2 16 (L) 20 - 33 mmol/L    Anion Gap 16.0 7.0 - 16.0    Glucose 116 (H) 65 - 99 mg/dL    Bun 63 (H) 8 - 22 mg/dL    Creatinine 3.13  (H) 0.50 - 1.40 mg/dL    Calcium 8.9 8.5 - 10.5 mg/dL    AST(SGOT) 48 (H) 12 - 45 U/L    ALT(SGPT) 35 2 - 50 U/L    Alkaline Phosphatase 108 (H) 30 - 99 U/L    Total Bilirubin 3.5 (H) 0.1 - 1.5 mg/dL    Albumin 2.9 (L) 3.2 - 4.9 g/dL    Total Protein 7.0 6.0 - 8.2 g/dL    Globulin 4.1 (H) 1.9 - 3.5 g/dL    A-G Ratio 0.7 g/dL   TROPONIN   Result Value Ref Range    Troponin T 67 (H) 6 - 19 ng/L   CoV-2, FLU A/B, and RSV by PCR (2-4 Hours CEPHEID) : Collect NP swab in VTM    Specimen: Respirate   Result Value Ref Range    SARS-CoV-2 Source NP Swab    PLATELET ESTIMATE   Result Value Ref Range    Plt Estimation Decreased    MORPHOLOGY   Result Value Ref Range    RBC Morphology Present     Poikilocytosis 1+     Spherocytes 1+    PERIPHERAL SMEAR REVIEW   Result Value Ref Range    Peripheral Smear Review see below    DIFFERENTIAL MANUAL   Result Value Ref Range    Bands-Stabs 3.40 0.00 - 10.00 %    Manual Diff Status PERFORMED    ESTIMATED GFR   Result Value Ref Range    GFR (CKD-EPI) 22 (A) >60 mL/min/1.73 m 2   URINE MICROSCOPIC (W/UA)   Result Value Ref Range    WBC 20-50 (A) /hpf    RBC Rare /hpf    Bacteria Negative None /hpf    Epithelial Cells Few /hpf    Amorphous Crystal Present /hpf    Granular Casts 3-5 (A) /lpf       RADIOLOGY  DX-CHEST-PORTABLE (1 VIEW)   Final Result      1.  Hazy opacity at the right lower lung, similar to 5/25/2021 exam. Scarring versus pneumonia.      CT-ABDOMEN-PELVIS W/O    (Results Pending)     PROCEDURE  CENTRAL LINE PROCEDURE NOTE: (with ultrasound guidance)  The patient was consented all risks benefits and alternatives were discussed.  LOCATION: Right IJ  POSITION: Supine  PROCEDURE NOTE:  Maximal Sterile Barrier Technique was used. The vascular triangle was identified. Local anesthesia 1%  lidocaine 3cc was infiltrated with a 27 gauge needle. Using ultrasound guidance, and Sterile Ultrasound Technique, the right internal jugular vein was canulated with good flow of dark,  non-pulsatile blood. At no point in the procedure was air aspirated with needle introduction. Wire was passed without difficulty. Dilation was performed. The central line catheter was advanced via Seldinger technique without difficulty and the guidewire was removed intact. The line was secured in place using 4-0 nylon suture. Air in the various ports was evacuated and the lines were flushed by me. Patient tolerated the procedure well there were no complications. A chest x-ray was ordered prior to use of the IV.    POST-PROCEDURE CXR:  0835 - There is no pneumothorax present. The tip of the line is well positioned at the SVC /RA junction.      COURSE & MEDICAL DECISION MAKING  Nursing notes and vital signs were reviewed. (See chart for details)  The patients  records were reviewed, history was obtained from the patient;     ED evaluation most consistent with sepsis, likely secondary to UTI.  Nitrite positive urine.  He does also have diarrhea, although only 4-5 episodes of nonbloody stool, came from SNF, denies recent antibiotic use or other change medications.  Stool studies, C. difficile pending.  Broad-spectrum antibiotics have been given, cefepime (penicillin allergy) and Vanco after discussion with pharmacy.  Chronic right lower lobe hazy opacity, no clinical evidence for pneumonia.  History of CHF, clinically not fluid overloaded, no rales, no pulmonary edema, no lower extremity pitting edema despite nonspecific troponin elevation at 67 [place] at [address].  Tolerating slow IV fluid infusion for hypotension and presumed sepsis.  1 L of LR has infused, additional 500 cc infusing now, blood pressure is responsive, heart rate trending downward.  A. fib RVR, history of the same, will continue IV fluid resuscitation before any rate control is considered.  Electrolyte abnormalities consistent with dehydration, query also diarrheal loss, with sodium 123, chloride 91, CO2 16, potassium 3.1.  He also has acute renal  failure with creatinine 3.13, previously normal.  Elevated total bilirubin 3.5 with only slightly elevated AST 48, alk phos 108.  No abdominal pain but given prior surgeries and diarrhea noncontrast CT is pending for obstruction or pancolitis.  He remains neurologically intact and nonfocal, mentation is appropriate.  He will be hospitalized for further evaluation and treatment.    5:51 AM hospitalist paged.    6:01 AM Dr. Sanchez is aware of the patient agreeable to consultation.  At this time appears appropriate for telemetry, will notify/upgrade if indicated after his evaluation.    6:41 AM Dr. Sanchez is seen evaluated patient at bedside.  Given persistent hypotension he does recommend ICU/IMCU.   is aware of the patient now and agreeable to consultation.    8:14 AM central venous catheter placed as described above without complication.  Awaiting chest x-ray.  Patient had 3 L total IV fluid infusion, will continue gentle infusion to max 30 cc/kg recommended bolus as he does not demonstrate fluid overload as of yet.  No urine output yet.  Labile blood pressure, occasionally systolic just over 100, returns to 80s despite fluid infusion.  Add small dose Levophed, titrate as needed.    9:06 AM Dr. Reveles is aware of the patient, updated with current vitals, fluid infusion and vasopressor use.  Will place orders for ICU.  Patient is in CT now for scan of abdomen/pelvis.    The total critical care time on this patient is 40 minutes, immediate and continuous hemodynamic monitoring and multiple bedside evaluations, resuscitating patient and assessing response to treatment, deciphering test results, speaking with admitting physician, and arranging for hospital admission. This 40 minutes is exclusive of separately billable procedures.      FINAL IMPRESSION  (R19.7) Diarrhea, unspecified type  (E86.0) Dehydration  (E87.6) Hypokalemia  (E87.1) Hyponatremia  (I95.9) Hypotension, unspecified hypotension type  (N17.9)  Acute renal failure, unspecified acute renal failure type (HCC)  (A41.9) Sepsis, due to unspecified organism, unspecified whether acute organ dysfunction present (HCC)  (N39.0,  R31.9) Urinary tract infection with hematuria, site unspecified      Electronically signed by: Amanda Sosa D.O., 11/5/2022 4:59 AM      This dictation was created using voice recognition software. The accuracy of the dictation is limited to the abilities of the software. I expect there may be some errors of grammar and possibly content. The nursing notes were reviewed and certain aspects of this information were incorporated into this note.

## 2022-11-05 NOTE — ED NOTES
Addison from Lab called with critical result of lactic acid of 4.3 at 0641. Critical lab result read back to Addison.   Dr. Sosa notified of critical lab result at 0641.  Critical lab result read back by Dr. Sosa.

## 2022-11-05 NOTE — ED NOTES
Addison from Lab called with critical result of procalcitonin of 23 at 0740. Critical lab result read back to Addison.   Dr. Sosa notified of critical lab result at 0745.  Critical lab result read back by Dr. Sosa.

## 2022-11-05 NOTE — ED NOTES
Pt admitted to hospital, report to receiving RN, pt transported to ICU T611 with ELISA Dumont on monitor and oxygen, all belongings and chart sent with patient.

## 2022-11-05 NOTE — PROGRESS NOTES
Left elbow is red, swollen, painful, sensitive to touch. Patient states he fell at the facility on Tuesday while walking from the bathroom. He landed on the left elbow. Patient was assisted then; however no images were taken.

## 2022-11-05 NOTE — ED TRIAGE NOTES
Chief Complaint   Patient presents with    Weakness     The pt is coming from [facility]. Staff and pt report weakness with tremors x2 days. No fever, bp 88/60. 500ml LR was given. The pt afib on the monitor at 115. Breathsounds bilaterally equal. Pt reports one episode of diarrhea. Hx of a-fib, CHF, Diabetes. 22g left forearm       BIB EMS to 1, pt on monitor and in gown, labs drawn and sent. Pt consists of: alert and oriented, diaphoretic, and tremory.     Medications given en route:LR    Ht 1.829 m (6')   Wt (!) 132 kg (290 lb)   BMI 39.33 kg/m²

## 2022-11-06 ENCOUNTER — APPOINTMENT (OUTPATIENT)
Dept: RADIOLOGY | Facility: MEDICAL CENTER | Age: 57
DRG: 871 | End: 2022-11-06
Payer: MEDICARE

## 2022-11-06 PROBLEM — E87.20 NORMAL ANION GAP METABOLIC ACIDOSIS: Status: ACTIVE | Noted: 2022-11-06

## 2022-11-06 LAB
ALBUMIN SERPL BCP-MCNC: 2.6 G/DL (ref 3.2–4.9)
ALBUMIN/GLOB SERPL: 0.7 G/DL
ALP SERPL-CCNC: 114 U/L (ref 30–99)
ALT SERPL-CCNC: 56 U/L (ref 2–50)
ANION GAP SERPL CALC-SCNC: 15 MMOL/L (ref 7–16)
ANION GAP SERPL CALC-SCNC: 16 MMOL/L (ref 7–16)
AST SERPL-CCNC: 97 U/L (ref 12–45)
BILIRUB SERPL-MCNC: 3.3 MG/DL (ref 0.1–1.5)
BUN SERPL-MCNC: 69 MG/DL (ref 8–22)
BUN SERPL-MCNC: 70 MG/DL (ref 8–22)
BUN SERPL-MCNC: 71 MG/DL (ref 8–22)
BUN SERPL-MCNC: 73 MG/DL (ref 8–22)
CALCIUM SERPL-MCNC: 8.5 MG/DL (ref 8.5–10.5)
CALCIUM SERPL-MCNC: 8.5 MG/DL (ref 8.5–10.5)
CALCIUM SERPL-MCNC: 8.6 MG/DL (ref 8.5–10.5)
CALCIUM SERPL-MCNC: 8.6 MG/DL (ref 8.5–10.5)
CHLORIDE SERPL-SCNC: 100 MMOL/L (ref 96–112)
CHLORIDE SERPL-SCNC: 96 MMOL/L (ref 96–112)
CHLORIDE SERPL-SCNC: 97 MMOL/L (ref 96–112)
CHLORIDE SERPL-SCNC: 98 MMOL/L (ref 96–112)
CK SERPL-CCNC: 596 U/L (ref 0–154)
CO2 SERPL-SCNC: 14 MMOL/L (ref 20–33)
CO2 SERPL-SCNC: 15 MMOL/L (ref 20–33)
CREAT SERPL-MCNC: 2.59 MG/DL (ref 0.5–1.4)
CREAT SERPL-MCNC: 2.79 MG/DL (ref 0.5–1.4)
CREAT SERPL-MCNC: 3.04 MG/DL (ref 0.5–1.4)
CREAT SERPL-MCNC: 3.46 MG/DL (ref 0.5–1.4)
CRP SERPL HS-MCNC: 34.89 MG/DL (ref 0–0.75)
E COLI SXT1+2 STL IA: NORMAL
ERYTHROCYTE [DISTWIDTH] IN BLOOD BY AUTOMATED COUNT: 48.3 FL (ref 35.9–50)
ERYTHROCYTE [SEDIMENTATION RATE] IN BLOOD BY WESTERGREN METHOD: 14 MM/HOUR (ref 0–20)
GFR SERPLBLD CREATININE-BSD FMLA CKD-EPI: 19 ML/MIN/1.73 M 2
GFR SERPLBLD CREATININE-BSD FMLA CKD-EPI: 23 ML/MIN/1.73 M 2
GFR SERPLBLD CREATININE-BSD FMLA CKD-EPI: 25 ML/MIN/1.73 M 2
GFR SERPLBLD CREATININE-BSD FMLA CKD-EPI: 28 ML/MIN/1.73 M 2
GLOBULIN SER CALC-MCNC: 3.9 G/DL (ref 1.9–3.5)
GLUCOSE BLD STRIP.AUTO-MCNC: 144 MG/DL (ref 65–99)
GLUCOSE BLD STRIP.AUTO-MCNC: 166 MG/DL (ref 65–99)
GLUCOSE BLD STRIP.AUTO-MCNC: 172 MG/DL (ref 65–99)
GLUCOSE SERPL-MCNC: 165 MG/DL (ref 65–99)
GLUCOSE SERPL-MCNC: 173 MG/DL (ref 65–99)
GLUCOSE SERPL-MCNC: 186 MG/DL (ref 65–99)
GLUCOSE SERPL-MCNC: 190 MG/DL (ref 65–99)
HCT VFR BLD AUTO: 35.3 % (ref 42–52)
HGB BLD-MCNC: 11.9 G/DL (ref 14–18)
LACTATE SERPL-SCNC: 1.4 MMOL/L (ref 0.5–2)
MAGNESIUM SERPL-MCNC: 1.8 MG/DL (ref 1.5–2.5)
MCH RBC QN AUTO: 30 PG (ref 27–33)
MCHC RBC AUTO-ENTMCNC: 33.7 G/DL (ref 33.7–35.3)
MCV RBC AUTO: 88.9 FL (ref 81.4–97.8)
PLATELET # BLD AUTO: 101 K/UL (ref 164–446)
PMV BLD AUTO: 12.1 FL (ref 9–12.9)
POTASSIUM SERPL-SCNC: 3 MMOL/L (ref 3.6–5.5)
POTASSIUM SERPL-SCNC: 3 MMOL/L (ref 3.6–5.5)
POTASSIUM SERPL-SCNC: 3.6 MMOL/L (ref 3.6–5.5)
POTASSIUM SERPL-SCNC: 3.6 MMOL/L (ref 3.6–5.5)
PROT SERPL-MCNC: 6.5 G/DL (ref 6–8.2)
RBC # BLD AUTO: 3.97 M/UL (ref 4.7–6.1)
SIGNIFICANT IND 70042: NORMAL
SITE SITE: NORMAL
SODIUM SERPL-SCNC: 126 MMOL/L (ref 135–145)
SODIUM SERPL-SCNC: 128 MMOL/L (ref 135–145)
SODIUM SERPL-SCNC: 129 MMOL/L (ref 135–145)
SODIUM SERPL-SCNC: 130 MMOL/L (ref 135–145)
SOURCE SOURCE: NORMAL
WBC # BLD AUTO: 14.6 K/UL (ref 4.8–10.8)

## 2022-11-06 PROCEDURE — 85027 COMPLETE CBC AUTOMATED: CPT

## 2022-11-06 PROCEDURE — 700111 HCHG RX REV CODE 636 W/ 250 OVERRIDE (IP): Performed by: STUDENT IN AN ORGANIZED HEALTH CARE EDUCATION/TRAINING PROGRAM

## 2022-11-06 PROCEDURE — 99291 CRITICAL CARE FIRST HOUR: CPT | Mod: GC | Performed by: INTERNAL MEDICINE

## 2022-11-06 PROCEDURE — 700111 HCHG RX REV CODE 636 W/ 250 OVERRIDE (IP): Performed by: INTERNAL MEDICINE

## 2022-11-06 PROCEDURE — 700102 HCHG RX REV CODE 250 W/ 637 OVERRIDE(OP)

## 2022-11-06 PROCEDURE — 80053 COMPREHEN METABOLIC PANEL: CPT

## 2022-11-06 PROCEDURE — 700101 HCHG RX REV CODE 250: Performed by: STUDENT IN AN ORGANIZED HEALTH CARE EDUCATION/TRAINING PROGRAM

## 2022-11-06 PROCEDURE — 99223 1ST HOSP IP/OBS HIGH 75: CPT | Performed by: INTERNAL MEDICINE

## 2022-11-06 PROCEDURE — 700102 HCHG RX REV CODE 250 W/ 637 OVERRIDE(OP): Performed by: STUDENT IN AN ORGANIZED HEALTH CARE EDUCATION/TRAINING PROGRAM

## 2022-11-06 PROCEDURE — 770022 HCHG ROOM/CARE - ICU (200)

## 2022-11-06 PROCEDURE — 700111 HCHG RX REV CODE 636 W/ 250 OVERRIDE (IP)

## 2022-11-06 PROCEDURE — 82550 ASSAY OF CK (CPK): CPT

## 2022-11-06 PROCEDURE — 83735 ASSAY OF MAGNESIUM: CPT

## 2022-11-06 PROCEDURE — A9270 NON-COVERED ITEM OR SERVICE: HCPCS | Performed by: STUDENT IN AN ORGANIZED HEALTH CARE EDUCATION/TRAINING PROGRAM

## 2022-11-06 PROCEDURE — 700105 HCHG RX REV CODE 258: Performed by: INTERNAL MEDICINE

## 2022-11-06 PROCEDURE — 82962 GLUCOSE BLOOD TEST: CPT | Mod: 91

## 2022-11-06 PROCEDURE — 86140 C-REACTIVE PROTEIN: CPT

## 2022-11-06 PROCEDURE — 80048 BASIC METABOLIC PNL TOTAL CA: CPT | Mod: 91

## 2022-11-06 PROCEDURE — 76882 US LMTD JT/FCL EVL NVASC XTR: CPT | Mod: LT

## 2022-11-06 PROCEDURE — 700105 HCHG RX REV CODE 258: Performed by: STUDENT IN AN ORGANIZED HEALTH CARE EDUCATION/TRAINING PROGRAM

## 2022-11-06 PROCEDURE — 85652 RBC SED RATE AUTOMATED: CPT

## 2022-11-06 PROCEDURE — 700102 HCHG RX REV CODE 250 W/ 637 OVERRIDE(OP): Performed by: INTERNAL MEDICINE

## 2022-11-06 PROCEDURE — A9270 NON-COVERED ITEM OR SERVICE: HCPCS

## 2022-11-06 RX ORDER — POTASSIUM CHLORIDE 14.9 MG/ML
20 INJECTION INTRAVENOUS ONCE
Status: COMPLETED | OUTPATIENT
Start: 2022-11-06 | End: 2022-11-06

## 2022-11-06 RX ORDER — POTASSIUM CHLORIDE 20 MEQ/1
40 TABLET, EXTENDED RELEASE ORAL ONCE
Status: COMPLETED | OUTPATIENT
Start: 2022-11-06 | End: 2022-11-06

## 2022-11-06 RX ORDER — POTASSIUM CHLORIDE 14.9 MG/ML
INJECTION INTRAVENOUS
Status: COMPLETED
Start: 2022-11-06 | End: 2022-11-06

## 2022-11-06 RX ORDER — MAGNESIUM SULFATE HEPTAHYDRATE 40 MG/ML
2 INJECTION, SOLUTION INTRAVENOUS ONCE
Status: COMPLETED | OUTPATIENT
Start: 2022-11-06 | End: 2022-11-06

## 2022-11-06 RX ADMIN — METRONIDAZOLE 500 MG: 500 INJECTION, SOLUTION INTRAVENOUS at 05:43

## 2022-11-06 RX ADMIN — HYDROCORTISONE SODIUM SUCCINATE 50 MG: 100 INJECTION, POWDER, FOR SOLUTION INTRAMUSCULAR; INTRAVENOUS at 05:44

## 2022-11-06 RX ADMIN — POTASSIUM CHLORIDE 20 MEQ: 14.9 INJECTION, SOLUTION INTRAVENOUS at 02:32

## 2022-11-06 RX ADMIN — CEFEPIME 2 G: 2 INJECTION, POWDER, FOR SOLUTION INTRAVENOUS at 05:47

## 2022-11-06 RX ADMIN — AMIODARONE HYDROCHLORIDE 0.5 MG/MIN: 1.8 INJECTION, SOLUTION INTRAVENOUS at 07:45

## 2022-11-06 RX ADMIN — NOREPINEPHRINE BITARTRATE 4 MCG/MIN: 1 INJECTION, SOLUTION, CONCENTRATE INTRAVENOUS at 14:00

## 2022-11-06 RX ADMIN — APIXABAN 5 MG: 5 TABLET, FILM COATED ORAL at 06:00

## 2022-11-06 RX ADMIN — TRAZODONE HYDROCHLORIDE 50 MG: 50 TABLET ORAL at 21:40

## 2022-11-06 RX ADMIN — INSULIN HUMAN 2 UNITS: 100 INJECTION, SOLUTION PARENTERAL at 17:08

## 2022-11-06 RX ADMIN — METRONIDAZOLE 500 MG: 500 INJECTION, SOLUTION INTRAVENOUS at 21:40

## 2022-11-06 RX ADMIN — APIXABAN 5 MG: 5 TABLET, FILM COATED ORAL at 17:01

## 2022-11-06 RX ADMIN — POTASSIUM CHLORIDE 20 MEQ: 14.9 INJECTION INTRAVENOUS at 02:32

## 2022-11-06 RX ADMIN — DAPTOMYCIN 1110 MG: 500 INJECTION, POWDER, LYOPHILIZED, FOR SOLUTION INTRAVENOUS at 13:00

## 2022-11-06 RX ADMIN — ACETAMINOPHEN 650 MG: 325 TABLET, FILM COATED ORAL at 03:57

## 2022-11-06 RX ADMIN — METRONIDAZOLE 500 MG: 500 INJECTION, SOLUTION INTRAVENOUS at 14:00

## 2022-11-06 RX ADMIN — MAGNESIUM SULFATE HEPTAHYDRATE 2 G: 40 INJECTION, SOLUTION INTRAVENOUS at 09:30

## 2022-11-06 RX ADMIN — NYSTATIN: 100000 POWDER TOPICAL at 18:00

## 2022-11-06 RX ADMIN — POTASSIUM CHLORIDE 40 MEQ: 1500 TABLET, EXTENDED RELEASE ORAL at 17:01

## 2022-11-06 RX ADMIN — HYDROCORTISONE SODIUM SUCCINATE 50 MG: 100 INJECTION, POWDER, FOR SOLUTION INTRAMUSCULAR; INTRAVENOUS at 12:00

## 2022-11-06 RX ADMIN — INSULIN HUMAN 2 UNITS: 100 INJECTION, SOLUTION PARENTERAL at 21:40

## 2022-11-06 RX ADMIN — HYDROCORTISONE SODIUM SUCCINATE 50 MG: 100 INJECTION, POWDER, FOR SOLUTION INTRAMUSCULAR; INTRAVENOUS at 17:02

## 2022-11-06 RX ADMIN — AMIODARONE HYDROCHLORIDE 0.5 MG/MIN: 1.8 INJECTION, SOLUTION INTRAVENOUS at 20:06

## 2022-11-06 RX ADMIN — LEVOTHYROXINE SODIUM 150 MCG: 0.07 TABLET ORAL at 05:43

## 2022-11-06 RX ADMIN — CEFEPIME 2 G: 2 INJECTION, POWDER, FOR SOLUTION INTRAVENOUS at 17:01

## 2022-11-06 RX ADMIN — NYSTATIN: 100000 POWDER TOPICAL at 05:50

## 2022-11-06 ASSESSMENT — CHA2DS2 SCORE
CHF OR LEFT VENTRICULAR DYSFUNCTION: YES
VASCULAR DISEASE: NO
CHA2DS2 VASC SCORE: 2
AGE 65 TO 74: NO
DIABETES: YES
SEX: MALE
AGE 75 OR GREATER: NO
PRIOR STROKE OR TIA OR THROMBOEMBOLISM: NO
HYPERTENSION: NO

## 2022-11-06 ASSESSMENT — ENCOUNTER SYMPTOMS
DIARRHEA: 0
MYALGIAS: 0
SHORTNESS OF BREATH: 0
NAUSEA: 0
DIZZINESS: 0
SPUTUM PRODUCTION: 0
ABDOMINAL PAIN: 0
PALPITATIONS: 0
CHILLS: 0
COUGH: 0
VOMITING: 0
CONSTIPATION: 0
FEVER: 0
DOUBLE VISION: 0
WHEEZING: 0

## 2022-11-06 ASSESSMENT — PAIN DESCRIPTION - PAIN TYPE
TYPE: ACUTE PAIN
TYPE: ACUTE PAIN

## 2022-11-06 ASSESSMENT — FIBROSIS 4 INDEX: FIB4 SCORE: 7.57

## 2022-11-06 NOTE — CARE PLAN
The patient is Watcher - Medium risk of patient condition declining or worsening    Shift Goals  Clinical Goals: Maintain hemodynamics, wean pressors  Patient Goals: Sleep, comfort    Progress made toward(s) clinical / shift goals:  Patients hemodynamics maintained, able to wean levophed down overnight, appeared comfortable. Able to wean oxygen to 1L nasal cannula.     Patient is not progressing towards the following goals: Remains on vasoactive drips.

## 2022-11-06 NOTE — PROGRESS NOTES
CRITICAL CARE MEDICINE ATTENDING PROGRESS NOTE    Date of admission  11/5/2022    Chief Complaint  57 y.o. male admitted 11/5/2022 with septic shock.  He has a history of non-ischemic cardiomyopathy, permanent AF, DM 2 and a perforated bowel.    Hospital Course      11/7 -    titrating norepinephrine.  Continue antibiotics.  Change amiodarone to enteral preparation.      Interval Problem Update  Reviewed last 24 hour events:      AF  98.2  -389 mL over last 24  +3810 mL since admit  Replete potassium  SRUTHI improved  NE  Amiodarone      Review of Systems  Review of Systems   Unable to perform ROS: Acuity of condition     Vital Signs for the last 24 hours  Pulse:  [] 91  Resp:  [13-48] 15  BP: ()/(43-83) 109/55  SpO2:  [91 %-98 %] 95 %    Hemodynamic parameters for the last 24 hours       Vent Settings for the last 24 hours       Physical Exam  Physical Exam  Constitutional:       Appearance: He is not diaphoretic.   HENT:      Head: Normocephalic.      Nose: Nose normal.      Mouth/Throat:      Pharynx: Oropharynx is clear.   Eyes:      Pupils: Pupils are equal, round, and reactive to light.   Cardiovascular:      Comments: Atrial fibrillation  Pulmonary:      Breath sounds: Rales (Few crackles at the bases) present. No wheezing.   Abdominal:      General: There is no distension.      Tenderness: There is no abdominal tenderness.   Musculoskeletal:      Cervical back: Normal range of motion.      Right lower leg: Edema present.      Left lower leg: Edema present.   Skin:     General: Skin is warm.      Capillary Refill: Capillary refill takes less than 2 seconds.   Neurological:      General: No focal deficit present.      Mental Status: He is oriented to person, place, and time.      Cranial Nerves: No cranial nerve deficit.       Medications  Current Facility-Administered Medications   Medication Dose Route Frequency Provider Last Rate Last Admin    amiodarone (Cordarone) tablet 400 mg  400 mg Oral TWICE  DAILY Wicho Johnson M.D.        Followed by    [START ON 11/12/2022] amiodarone (Cordarone) tablet 400 mg  400 mg Oral Q DAY Wicho Johnson M.D.        Followed by    [START ON 11/17/2022] amiodarone (Cordarone) tablet 200 mg  200 mg Oral DAILY Wicho Johnson M.D.        K+ Scale: Goal of 4.5  1 Each Intravenous Q6HRS Wicho Johnson M.D.   1 Each at 11/07/22 0915    insulin regular (HumuLIN R,NovoLIN R) injection  2-9 Units Subcutaneous 4X/DAY ACHS Freddie Machado M.D.   2 Units at 11/07/22 0620    And    dextrose 10 % BOLUS 25 g  25 g Intravenous Q15 MIN PRN Freddie Machado M.D.        DAPTOmycin (CUBICIN) 1,110 mg in NS 50 mL IVPB  8 mg/kg Intravenous Q24HRS Cristy Holley M.D. 100 mL/hr at 11/06/22 1300 1,110 mg at 11/06/22 1300    acetaminophen (Tylenol) tablet 650 mg  650 mg Oral Q6HRS PRN Eric Sanchez M.D.   650 mg at 11/06/22 0357    cefepime (Maxipime) 2 g in  mL IVPB  2 g Intravenous Q12HRS Mio Reveles M.D.   Stopped at 11/07/22 0615    metroNIDAZOLE (Flagyl) IVPB 500 mg  500 mg Intravenous Q8HRS Mio Reveles M.D.   Stopped at 11/07/22 0645    norepinephrine (Levophed) 8 mg in 250 mL NS infusion (premix)  0.5-30 mcg/min Intravenous Continuous Mio Reveles M.D. 1.9 mL/hr at 11/07/22 0715 1 mcg/min at 11/07/22 0715    And    vasopressin (Vasostrict) 20 Units in  mL Infusion  0.03 Units/min Intravenous Continuous Mio Reveles M.D.   Stopped at 11/06/22 0733    levothyroxine (SYNTHROID) tablet 150 mcg  150 mcg Oral AM ES Mio Reveles M.D.   150 mcg at 11/07/22 0545    apixaban (ELIQUIS) tablet 5 mg  5 mg Oral BID Mio Reveles M.D.   5 mg at 11/07/22 0545    traZODone (DESYREL) tablet 50 mg  50 mg Oral Nightly Mio Reveles M.D.   50 mg at 11/06/22 2140    senna-docusate (PERICOLACE or SENOKOT S) 8.6-50 MG per tablet 2 Tablet  2 Tablet Oral BID Sony Bruno D.O.        And    polyethylene glycol/lytes (MIRALAX) PACKET 1 Packet  1  Packet Oral QDAY PRN Sony Bruno D.O.        And    magnesium hydroxide (MILK OF MAGNESIA) suspension 30 mL  30 mL Oral QDAY PRN Sony Bruno D.O.        And    bisacodyl (DULCOLAX) suppository 10 mg  10 mg Rectal QDAY PRN Sony Bruno D.O.        hydrocortisone sodium succinate PF (Solu-CORTEF) 100 MG injection 50 mg  50 mg Intravenous Q6HRS Mio Reveles M.D.   50 mg at 11/07/22 0545    nystatin (MYCOSTATIN) powder   Topical BID Mio Reveles M.D.   Given at 11/07/22 0618       Fluids    Intake/Output Summary (Last 24 hours) at 11/7/2022 0930  Last data filed at 11/7/2022 0600  Gross per 24 hour   Intake 1907.77 ml   Output 2250 ml   Net -342.23 ml       Laboratory      Recent Labs     11/06/22  0502 11/06/22  1230 11/06/22  1700 11/07/22  0029 11/07/22  0605   SODIUM 126*   < > 130* 131* 133*   POTASSIUM 3.6   < > 3.0* 3.0* 3.0*   CHLORIDE 96   < > 100 102 104   CO2 14*   < > 15* 16* 16*   BUN 71*   < > 70* 72* 70*   CREATININE 3.04*   < > 2.59* 2.37* 2.04*   MAGNESIUM 1.8  --   --   --  2.3   PHOSPHORUS  --   --   --   --  2.9   CALCIUM 8.6   < > 8.6 8.7 8.6    < > = values in this interval not displayed.     Recent Labs     11/05/22  0326 11/05/22  0414 11/05/22  0840 11/06/22  0502 11/06/22  1230 11/06/22  1700 11/07/22  0029 11/07/22  0605   ALTSGPT  --  35  --  56*  --   --   --  59*   ASTSGOT  --  48*  --  97*  --   --   --  87*   ALKPHOSPHAT  --  108*  --  114*  --   --   --  147*   TBILIRUBIN  --  3.5*  --  3.3*  --   --   --  2.5*   LIPASE 20  --   --   --   --   --   --   --    GLUCOSE  --  116*   < > 186*   < > 173* 193* 191*    < > = values in this interval not displayed.     Recent Labs     11/05/22  0326 11/05/22  0414 11/06/22  0502 11/07/22  0605   WBC 11.4*  --  14.6* 15.1*   NEUTSPOLYS 88.90*  --   --  98.20*   LYMPHOCYTES 2.60*  --   --  0.90*   MONOCYTES 4.30  --   --  0.90   EOSINOPHILS 0.00  --   --  0.00   BASOPHILS 0.80  --   --  0.00   ASTSGOT  --  48* 97* 87*    ALTSGPT  --  35 56* 59*   ALKPHOSPHAT  --  108* 114* 147*   TBILIRUBIN  --  3.5* 3.3* 2.5*     Recent Labs     11/05/22  0326 11/06/22  0502 11/07/22  0605   RBC 4.07* 3.97* 3.85*   HEMOGLOBIN 12.2* 11.9* 11.5*   HEMATOCRIT 36.3* 35.3* 33.6*   PLATELETCT 99* 101* 121*       Imaging  X-Ray:  I have personally reviewed the images and compared with prior images. and My impression is: Unchanged right lower lobe opacities    Assessment/Plan      Severe sepsis with septic shock   Associated acute kidney injury   Continue broad-spectrum intravenous antimicrobial chemotherapy   Continue empiric hydrocortisone for now   I am titrating norepinephrine to keep mean arterial pressure greater than 65    Streptococcus bacteremia   Final identification pending   Continue cefepime, metronidazole and daptomycin pending identification per ID   TTE without evidence of endocarditis    Acute kidney injury   Improving   Monitor renal function and urine output   Avoid nephrotoxins and renal dose medications    Acute systolic heart failure   LVEF 25-30%   On norepinephrine    Pulmonary hypertension   RVSP 50 mmHg    Atrial fibrillation   Rate control   Continue apixaban, 5 mg twice daily   Change amiodarone drip to enteral preparation   Optimize potassium and magnesium    Diabetes mellitus type 2   Sliding scale insulin   Check glycohemoglobin    Hypokalemia   Replete potassium      VTE:  NOAC  Ulcer: Not Indicated  Lines: Central Line  Ongoing indication addressed and Zabala Catheter  Ongoing indication addressed    I have performed a physical exam and reviewed and updated ROS and Plan today (11/7/2022). In review of yesterday's note (11/6/2022), there are no changes except as documented above.     I have assessed and reassessed his blood pressure, hemodynamics and cardiovascular status with titration of norepinephrine.  He is at increased risk for worsening cardiovascular system dysfunction.    Discussed patient condition and risk of  morbidity and/or mortality with RN, RT, Pharmacy, Charge nurse / hot rounds, and QA team    The patient remains critically ill.  Critical care time = 35 minutes in directly providing and coordinating critical care and extensive data review.  No time overlap and excludes procedures.    A Critical Care Medicine progress note may have been authored by a resident physician or advanced practitioner of nursing under my direct supervision on this date of service.  As the supervising and attending physician, I have either attested to or cosigned that document.  IN THE EVENT THAT DISCREPANCIES EXIST BETWEEN THIS DOCUMENT AND ANY DOCUMENT THAT I HAVE ATTESTED TO OR COSIGNED ON THIS DATE OF SERVICE, THEN THIS DOCUMENT REMAINS THE FINAL AUTHORITY AS TO MY ASSESSMENT AND PLAN REGARDING THE CARE OF THIS PATIENT.    Wicho Johnson MD  Pulmonary and Critical Care Medicine

## 2022-11-06 NOTE — CONSULTS
"INFECTIOUS DISEASES INPATIENT CONSULT NOTE     Date of Service: 11/6/2022    Consult Requested By: Freddie Machado M.D.    Reason for Consultation: strep septic shock    History of Present Illness:   Sebastián Csatro is a 57 y.o. diabetic male admitted 11/5/2022 to the ICU for above for SNF  Last seen by ID in Dec2020  Known nonischemic cardiomyopathy, A. fib on amiodarone and Eliquis, and prior hospitalization for bowel perforation   He told admitted MD that he had rigors for the past 4 days + watery diarrhea + fevers Denies any cough or sputum production, denied any new rashes,  + chronic left heel wound   In ED: Afebrile, pulse 120, tachypneic respirate 44, hypotensive blood pressure 97/37, and hypoxemic oxygen saturation 92% on 2 L nasal cannula.  Labs: +leukocytosis WBC 11.4, thrombocytopenia platelets 97, s SRUTHI-creatinine 3.13, BUN 63, UA positive for nitrite, lactic acid 4.3, BNP 33,293  CXR: CMG with pulm edema by my readCT abdomen pelvis: No evidence of bowel obstruction or focal inflammatory changes, no gallstones, with mildly distended colon with fluid  TTE no obvious vegetations  Currently on cefepime and flagyl  Infectious Diseases consulted for antibiotic recommendation and management  Majority of history obtained from providers and records as currently poor historian-only saying \"yea\" t    Review Of Systems:  Unable to obtain due to critical illness    PMH:   Past Medical History:   Diagnosis Date    A-fib (HCC)     Abscess 11/8/2020    Bacteremia 11/8/2020    Chest pain     Congestive heart failure (HCC)     Diabetes (HCC)     Diabetic neuropathy (HCC)     Hypercholesterolemia     Hypertension     Longstanding persistent atrial fibrillation (HCC) 11/8/2020    LV dysfunction 11/8/2020    Morbid obesity (HCC)     NICM (nonischemic cardiomyopathy) (HCC) 11/8/2020    Noncompliance     Normal cardiac stress test     Orthostatic hypotension     Sepsis (HCC) 11/8/2020         PSH:  Past Surgical History: "   Procedure Laterality Date    FL EXPLORATORY OF ABDOMEN  5/27/2021    Procedure: LAPAROTOMY, EXPLORATORY;  Surgeon: Kin Desouza D.O.;  Location: [facility];  Service: General    COLOSTOMY TAKEDOWN  5/27/2021    Procedure: COLOSTOMY TAKEDOWN AND CLOSURE;  Surgeon: Kin Desouza D.O.;  Location: [facility];  Service: General    COLECTOMY N/A 11/10/2020    Procedure: COLECTOMY-SEGMENTAL, COLOSTOMY, MOBILIZATION OF SPLENIC FLEXURE, WOUND VAC PLACEMENT, IRRIGATION AND DEBRIDMENT FLANK WOUND;  Surgeon: Kin Desouza D.O.;  Location: [facility];  Service: General    ZZZ CARDIAC CATH  07/21/2018    EF 45%, normal coronaries.    IRRIGATION & DEBRIDEMENT ORTHO Right 2/19/2018    Procedure: IRRIGATION & DEBRIDEMENT ORTHO-FOOT;  Surgeon: Kirby Lopez M.D.;  Location: [facility];  Service: Orthopedics    TOE AMPUTATION Right 1/17/2018    Procedure: TOE AMPUTATION-1ST RAY;  Surgeon: Doug Delong M.D.;  Location: [facility];  Service: Orthopedics    TOE AMPUTATION Right 11/10/2017    Procedure: TOE AMPUTATION;  Surgeon: Osorio Leo M.D.;  Location: [facility];  Service: Orthopedics       FAMILY HX:  Family History   Problem Relation Age of Onset    No Known Problems Mother     No Known Problems Father        SOCIAL HX:  Social History     Socioeconomic History    Marital status:      Spouse name: Not on file    Number of children: Not on file    Years of education: Not on file    Highest education level: Not on file   Occupational History    Not on file   Tobacco Use    Smoking status: Never    Smokeless tobacco: Never   Vaping Use    Vaping Use: Never used   Substance and Sexual Activity    Alcohol use: No    Drug use: No    Sexual activity: Not on file   Other Topics Concern    Not on file   Social History Narrative    Not on file     Social Determinants of Health     Financial Resource Strain: Not on file   Food Insecurity: Not on file    Transportation Needs: Not on file   Physical Activity: Not on file   Stress: Not on file   Social Connections: Not on file   Intimate Partner Violence: Not on file   Housing Stability: Not on file     Social History     Tobacco Use   Smoking Status Never   Smokeless Tobacco Never     Social History     Substance and Sexual Activity   Alcohol Use No       Allergies/Intolerances:  Allergies   Allergen Reactions    Zosyn [Piperacillin Sod-Tazobactam So] Rash and Itching     Redness/flushed throughout body.     Daptomycin Rash     Body rash  RXN=1/2018      Pcn [Penicillins] Hives and Rash      Rash & hives  RXN=since a kid  Tolerates cephalosporins    Unasyn [Ampicillin-Sulbactam Sodium] Hives, Itching and Swelling    Vancomycin Rash     Red man syndrome. Tolerates IV vancomycin at reduced infusion rate.         Other Current Medications:    Current Facility-Administered Medications:     insulin regular (HumuLIN R,NovoLIN R) injection, 2-9 Units, Subcutaneous, 4X/DAY ACHS **AND** POC blood glucose manual result, , , Q AC AND BEDTIME(S) **AND** NOTIFY MD and PharmD, , , Once **AND** Administer 20 grams of glucose (approximately 8 ounces of fruit juice) every 15 minutes PRN FSBG less than 70 mg/dL, , , PRN **AND** dextrose 10 % BOLUS 25 g, 25 g, Intravenous, Q15 MIN PRN, Freddie Machado M.D.    DAPTOmycin (CUBICIN) 1,110 mg in NS 50 mL IVPB, 8 mg/kg, Intravenous, Q24HRS, Cristy Holley M.D.    acetaminophen (Tylenol) tablet 650 mg, 650 mg, Oral, Q6HRS PRN, Eric Sanchez M.D., 650 mg at 11/06/22 0357    cefepime (Maxipime) 2 g in  mL IVPB, 2 g, Intravenous, Q12HRS, Mio Reveles M.D., Stopped at 11/06/22 0617    metroNIDAZOLE (Flagyl) IVPB 500 mg, 500 mg, Intravenous, Q8HRS, Mio Reveles M.D., Stopped at 11/06/22 0643    norepinephrine (Levophed) 8 mg in 250 mL NS infusion (premix), 0.5-30 mcg/min, Intravenous, Continuous, Last Rate: 7.5 mL/hr at 11/06/22 0549, 4 mcg/min at 11/06/22 0549 **AND**  vasopressin (Vasostrict) 20 Units in  mL Infusion, 0.03 Units/min, Intravenous, Continuous, Stopped at 22 0733 **AND** Notify physician if MAP remains less than 65 mmHg after 15 minutes of Norepinephrine and Vasopressin initiation, , , Once **AND** Initial Vasopressor Therapy for Septic Shock, , , CONTINUOUS, Mio Reveles M.D.    levothyroxine (SYNTHROID) tablet 150 mcg, 150 mcg, Oral, AM ES, Mio Reveles M.D., 150 mcg at 22 0543    apixaban (ELIQUIS) tablet 5 mg, 5 mg, Oral, BID, Mio Reveles M.D., 5 mg at 22 0600    traZODone (DESYREL) tablet 50 mg, 50 mg, Oral, Nightly, Mio Reveles M.D.    senna-docusate (PERICOLACE or SENOKOT S) 8.6-50 MG per tablet 2 Tablet, 2 Tablet, Oral, BID **AND** polyethylene glycol/lytes (MIRALAX) PACKET 1 Packet, 1 Packet, Oral, QDAY PRN **AND** magnesium hydroxide (MILK OF MAGNESIA) suspension 30 mL, 30 mL, Oral, QDAY PRN **AND** bisacodyl (DULCOLAX) suppository 10 mg, 10 mg, Rectal, QDAY PRN, Sony Bruno D.O.    hydrocortisone sodium succinate PF (Solu-CORTEF) 100 MG injection 50 mg, 50 mg, Intravenous, Q6HRS, Mio Reveles M.D., 50 mg at 22 0544    nystatin (MYCOSTATIN) powder, , Topical, BID, Mio Reveles M.D., Given at 22 0550    amiodarone (Nexterone) 360 mg/200 mL infusion, 0.5-1 mg/min, Intravenous, Continuous, Freddie Machado M.D., Last Rate: 17 mL/hr at 22 0745, 0.5 mg/min at 22 0745      Most Recent Vital Signs:  /72   Pulse (!) 108   Temp 36.9 °C (98.4 °F) (Bladder)   Resp (!) 21   Ht 1.829 m (6')   Wt (!) 139 kg (306 lb 10.6 oz)   SpO2 96%   BMI 41.59 kg/m²   Temp  Av.1 °C (100.6 °F)  Min: 36.9 °C (98.4 °F)  Max: 39.3 °C (102.7 °F)    Physical Exam:  General: Ill-appearing, well nourished disheveled  HEENT: NCAT, PERRLA, sclera anicteric, PERRL, EOMI,  no oral lesions Dentition poor  Neck: supple, no lymphadenopathy right IJ  Chest: CTAB, unlabored. Decreased BS  Cardiac: tachy IRR,  no  m/r/g   Abdomen: + bowel sounds, soft, non-tender, non-distended  Extremities: No cyanosis, clubbing. + edema, erythema left elbow ext to humerus-no palpable fluid  Skin: + rashes dry skin multiple pressure wounds left heel ulcer  Ecchymotic sacrum  Neuro: Alert single word response, tremor RUE CN intact PATHAK  Psych: Mood normal Affect flat    Pertinent Lab Results:  Recent Labs     11/05/22  0326 11/06/22  0502   WBC 11.4* 14.6*      Recent Labs     11/05/22  0326 11/06/22  0502   HEMOGLOBIN 12.2* 11.9*   HEMATOCRIT 36.3* 35.3*   MCV 89.2 88.9   MCH 30.0 30.0   PLATELETCT 99* 101*         Recent Labs     11/05/22  1636 11/05/22  2349 11/06/22  0502   SODIUM 122* 128* 126*   POTASSIUM 3.4* 3.6 3.6   CHLORIDE 88* 97 96   CO2 14* 15* 14*   CREATININE 3.71* 3.46* 3.04*        Recent Labs     11/05/22  0414 11/06/22  0502   ALBUMIN 2.9* 2.6*        Pertinent Micro:  Results       Procedure Component Value Units Date/Time    CULTURE STOOL [[id number]] Collected: 11/05/22 0450    Order Status: Sent Specimen: Stool Updated: 11/05/22 1611     Significant Indicator NEG     Source STL     Site STOOL     Culture Result -     EHEC -     Campylobactor Antigen --     Negative for Campylobacter Antigen.  Test results are to be used in conjunction with information  available from the patient clinical evaluation and other  diagnostic procedures.      Narrative:      For adult patients only; culture includes screen for  Campylobacter.  For adult patients only; culture includes screen for    Blood Culture [[id number]]  (Abnormal) Collected: 11/05/22 0414    Order Status: Completed Specimen: Blood from Peripheral Updated: 11/05/22 1549     Significant Indicator POS     Source BLD     Site PERIPHERAL     Culture Result Growth detected by Bactec instrument. 11/05/2022  15:42  Gram Stain: Gram positive cocci: Possible Streptococcus sp.      Narrative:      CALL  [place]  161 tel. [phone],  CALLED  161 tel. [phone] 11/05/2022, 15:45, RB  "PERF. RESULTS CALLED TO: rn  [id number]  1 of 2 for Blood Culture x 2 sites order. Per Hospital  Policy: Only change Specimen Src: to \"Line\" if specified by  physician order.  Left Hand    MRSA By PCR (Amp) [[id number]] Collected: 11/05/22 1156    Order Status: Completed Specimen: Respirate from Nares Updated: 11/05/22 1433     MRSA by PCR POSITIVE    Narrative:      Collected By: [id number] AUGUSTIN BALL    Blood Culture [[id number]]  (Abnormal) Collected: 11/05/22 0415    Order Status: Completed Specimen: Blood from Peripheral Updated: 11/05/22 1429     Significant Indicator POS     Source BLD     Site PERIPHERAL     Culture Result Growth detected by Bactec instrument. 11/05/2022  14:27  Gram Stain: Gram positive cocci: Possible Streptococcus sp.      Narrative:      CALL  [place]  161 tel. [phone],  CALLED  161 tel. [phone] 11/05/2022, 14:28, RB PERF. RESULTS CALLED TO: rn  [id number]  2 of 2 blood culture x2  Sites order. Per Hospital Policy:  Only change Specimen Src: to \"Line\" if specified by physician  order.  Right Wrist    Urine Culture (NEW) [[id number]] Collected: 11/05/22 0500    Order Status: Sent Specimen: Urine Updated: 11/05/22 0810    Narrative:      Indication for culture:->Evaluation for sepsis without a  clear source of infection    C Diff by PCR rflx Toxin [[id number]] Collected: 11/05/22 0450    Order Status: Completed Specimen: Stool Updated: 11/05/22 0804     C Diff by PCR Negative     Comment: C. difficile NOT detected by PCR.  Treatment not indicated per guidelines.  Repeat testing not indicated within 7 days.          027-NAP1-BI Presumptive Negative     Comment: Presumptive 027/NAP1/BI target DNA sequences are NOT DETECTED.       Narrative:      Does this patient have risk factors for C-diff?->Yes  C-Diff Risk Factors->immunocompromising conditions    CoV-2, FLU A/B, and RSV by PCR (2-4 Hours CEPHEID) : Collect NP swab in VTM [[id number]] Collected: 11/05/22 0507    Order Status: Completed " "Specimen: Respirate Updated: 11/05/22 0606     Influenza virus A RNA Negative     Influenza virus B, PCR Negative     RSV, PCR Negative     SARS-CoV-2 by PCR NotDetected     Comment: PATIENTS: Important information regarding your results and instructions can  be found at [email or web address]   \"After your  Covid-19 Test\"    [facility] providers: PLEASE REFER TO DE-ESCALATION AND RETESTING PROTOCOL  on [email or web address]    **The [employer] [facility] GeneXpert Xpress SARS-CoV-2 RT-PCR Test has been made  available for use under the Emergency Use Authorization (EUA) only.          SARS-CoV-2 Source NP Swab    Urinalysis [[id number]]  (Abnormal) Collected: 11/05/22 0500    Order Status: Completed Specimen: Urine Updated: 11/05/22 0555     Color DK Yellow     Character Turbid     Specific Gravity 1.017     Ph 5.0     Glucose 100 mg/dL      Ketones Trace mg/dL      Protein 30 mg/dL      Bilirubin Small     Urobilinogen, Urine 1.0     Nitrite Positive     Leukocyte Esterase Small     Occult Blood Negative     Micro Urine Req Microscopic    Narrative:      Indication for culture:->Evaluation for sepsis without a  clear source of infection          Blood Culture   Date Value Ref Range Status   03/15/2018 No growth after 5 days of incubation.  Final        Studies:  CT scan 11/5  IMPRESSION:     1.  No evidence of bowel obstruction or focal inflammatory change.     2.  Gallstones.     3.  Mildly distended colon which also contains fluid. No evidence of bowel obstruction    Left elbow xray-WNL    CXR CMG pulm edema  IMPRESSION:   Strep septic shock-at risk enterococcus in addition to other strep  DM   Afib  CHF-marked elevation BNP over 33,000  SRUTHI -normal renal function per chart review in 7/2021  Leukocytosis  Thrombocytopenia new-  New elevated bili with transaminitis  Hemodynamically unstable  Hypoxemia  Query UTI  Left heel ulcer through to muscle-doubt source  Cutaneous candidiasis under breast  Cellulitis left " elbow-query group A strep  Prolonged QTC-504 on current EKG    PLAN:   Hemodynamically unstable on pressors  Low grade fever  Leukocytosis+  BCxs + strep sp  Repeat Bcxs every 48 hours until neg  TTE unrevealing; recommend CÉSAR if persistently positive blood cultures  Continue cefepime and flagyl for now  Defer vanco due to renal failure  Daptomycin added for enterococcus until strep identified-monitor for rash (seen in 2018)  Adjust dose abx for renal failure  Final antibiotic recommendations per culture results and clinical course  LPS consult for heel ulcer-ortho consulted for elbow. No fracture  Topical antifungal for groin, under pannus, under breasts  Avoid QTC prolonging agents    Plan of care discussed with TARA Machado M.D.. Will continue to follow    Cristy Holley M.D.

## 2022-11-06 NOTE — PROGRESS NOTES
[facility] Progress Note      Admit Date: 11/5/2022  ICU Day:   [] 1      Resident(s): Sony Bruno D.O.  Attending: QUYEN HANCOCK/ Dr. Machado    Date & Time:   11/6/2022   10:55 AM       Patient ID:    Name:             Sebastián Castro     YOB: 1965  Age:                 57 y.o.  male   MRN:               [id number]    HPI:  Patient is a 57-year-old male with past medical history of nonischemic cardiomyopathy, permanent A. fib on amiodarone and Eliquis, now insulin-dependent type 2 diabetes mellitus, prior hospitalization for bowel perforation status post exploratory laparotomy admitted for septic shock with possible strep bacteremia on cefepime and Flagyl.  Infectious disease and orthopedic surgery consulted for possible source being the left heel versus left upper extremity/elbow.    Consultants:  Orthopedic surgery  Infectious Disease    Procedures:  None    Imaging:  EC-ECHOCARDIOGRAM COMPLETE W/ CONT   Final Result      DX-ELBOW-COMPLETE 3+ LEFT   Final Result      Normal elbow series.      CT-ABDOMEN-PELVIS W/O   Final Result      1.  No evidence of bowel obstruction or focal inflammatory change.      2.  Gallstones.      3.  Mildly distended colon which also contains fluid. No evidence of bowel obstruction.      DX-CHEST-PORTABLE (1 VIEW)   Final Result      1.  Cardiomegaly. There is also interstitial edema.      2.  Right IJ central line present without evidence of complication.      3.  Linear atelectasis within the right lung base.      DX-CHEST-PORTABLE (1 VIEW)   Final Result      1.  Hazy opacity at the right lower lung, similar to 5/25/2021 exam. Scarring versus pneumonia.      US-EXTREMITY NON VASCULAR UNILATERAL LEFT    (Results Pending)         Interval Events:  11/6: Infectious disease and orthopedic surgery consulted.  Left upper extremity ultrasound to evaluate erythematous left elbow.  Blood cultures positive for possible strep.  Improving shock, decreased pressor requirement  to Levophed only.  Renal function improving.  Echocardiogram revealing EF of 30%    Review of Systems   Constitutional:  Negative for chills and fever.   HENT:  Negative for congestion.    Eyes:  Negative for double vision.   Respiratory:  Negative for cough, sputum production, shortness of breath and wheezing.    Cardiovascular:  Negative for chest pain, palpitations and leg swelling.   Gastrointestinal:  Negative for abdominal pain, constipation, diarrhea, nausea and vomiting.   Genitourinary:  Negative for dysuria.   Musculoskeletal:  Positive for joint pain. Negative for myalgias.   Neurological:  Negative for dizziness.     PHYSICAL EXAM    Physical Exam  Vitals and nursing note reviewed.   Constitutional:       General: He is not in acute distress.  HENT:      Head: Normocephalic.      Mouth/Throat:      Mouth: Mucous membranes are moist.   Eyes:      General: No scleral icterus.     Extraocular Movements: Extraocular movements intact.      Pupils: Pupils are equal, round, and reactive to light.   Cardiovascular:      Rate and Rhythm: Regular rhythm. Tachycardia present.      Heart sounds: No murmur heard.    No friction rub. No gallop.   Pulmonary:      Effort: Pulmonary effort is normal.      Breath sounds: No wheezing, rhonchi or rales.   Abdominal:      General: Abdomen is flat. Bowel sounds are normal. There is no distension.      Palpations: Abdomen is soft.      Tenderness: There is no abdominal tenderness.   Musculoskeletal:      Left elbow: Swelling present. Tenderness present in lateral epicondyle.      Cervical back: Neck supple.      Comments: Erytematous   Skin:     General: Skin is warm.      Capillary Refill: Capillary refill takes less than 2 seconds.   Neurological:      Mental Status: He is alert.      Motor: Weakness present.   Psychiatric:         Mood and Affect: Mood normal.        Respiratory:     Respiration: (!) 21, Pulse Oximetry: 96 %    Chest Tube Drains:           HemoDynamics:  Pulse: (!) 108 Blood Pressure: 107/72      Neuro:      Fluids:  Date 22 0700 - 22 0659   Shift 4948-6696 8836-0694 0587-8060 24 Hour Total   INTAKE   P.O. 240   240     P.O. 240   240   I.V. 63   63     Amiodarone Volume 34   34     Vasopressin Volume 14   14     Norepinephrine Volume 15   15   IV Piggyback 0   0     Volume (mL) (potassium chloride (KCL) ivpb 10 mEq) 0   0     Volume (mL) (potassium chloride in water (KCL) ivpb **Administer in ICU only** 20 mEq) 0   0     Volume (mL) (potassium chloride in water (KCL) ivpb **Administer in ICU only** 20 mEq) 0   0   Shift Total 303   303   OUTPUT   Urine 350   350     Output (mL) (Urethral Catheter Non-latex 16 Fr.) 350   350   Shift Total 350   350   NET -47.1   -47.1        Intake/Output Summary (Last 24 hours) at 2022 1055  Last data filed at 2022 0930  Gross per 24 hour   Intake 3692.61 ml   Output 1540 ml   Net 2152.61 ml       Weight: (!) 139 kg (306 lb 10.6 oz)  Body mass index is 41.59 kg/m².    Recent Labs     22  1636 22  0502   SODIUM 122* 128* 126*   POTASSIUM 3.4* 3.6 3.6   CHLORIDE 88* 97 96   CO2 14* 15* 14*   BUN 67* 69* 71*   CREATININE 3.71* 3.46* 3.04*   MAGNESIUM  --   --  1.8   CALCIUM 8.5 8.5 8.6       GI/Nutrition:  Recent Labs     22  0326 22  0414 22  0840 22  1636 22  2349 22  0502   ALTSGPT  --  35  --   --   --  56*   ASTSGOT  --  48*  --   --   --  97*   ALKPHOSPHAT  --  108*  --   --   --  114*   TBILIRUBIN  --  3.5*  --   --   --  3.3*   LIPASE 20  --   --   --   --   --    GLUCOSE  --  116*   < > 145* 190* 186*    < > = values in this interval not displayed.       Heme:  Recent Labs     22  0326 22  0502   RBC 4.07* 3.97*   HEMOGLOBIN 12.2* 11.9*   HEMATOCRIT 36.3* 35.3*   PLATELETCT 99* 101*       Infectious Disease:  Monitored Temp 2  Av.5 °C (99.5 °F)  Min: 36.8 °C (98.2 °F)  Max: 39.4 °C (102.9 °F)  Temp  Avg:  38.2 °C (100.8 °F)  Min: 36.9 °C (98.4 °F)  Max: 39.3 °C (102.7 °F)  Recent Labs     11/05/22  0326 11/05/22  0414 11/06/22  0502   WBC 11.4*  --  14.6*   NEUTSPOLYS 88.90*  --   --    LYMPHOCYTES 2.60*  --   --    MONOCYTES 4.30  --   --    EOSINOPHILS 0.00  --   --    BASOPHILS 0.80  --   --    ASTSGOT  --  48* 97*   ALTSGPT  --  35 56*   ALKPHOSPHAT  --  108* 114*   TBILIRUBIN  --  3.5* 3.3*       Meds:   insulin regular  2-9 Units      And    dextrose bolus  25 g      magnesium sulfate  2 g 2 g (11/06/22 0930)    acetaminophen  650 mg      cefepime  2 g Stopped (11/06/22 0617)    metroNIDAZOLE (FLAGYL) IV  500 mg Stopped (11/06/22 0643)    norepinephrine (Levophed) infusion  0.5-30 mcg/min 4 mcg/min (11/06/22 0549)    And    vasopressin (PITRESSIN) infusion  0.03 Units/min Stopped (11/06/22 0733)    levothyroxine  150 mcg      apixaban  5 mg      traZODone  50 mg      senna-docusate  2 Tablet      And    polyethylene glycol/lytes  1 Packet      And    magnesium hydroxide  30 mL      And    bisacodyl  10 mg      hydrocortisone sodium succinate PF  50 mg      nystatin        amiodarone infusion  0.5-1 mg/min 0.5 mg/min (11/06/22 0745)          Assessment and Plan:  * Septic shock (HCC)  Assessment & Plan  This is Septic shock Present on admission  SIRS criteria identified on my evaluation include: Tachycardia, with heart rate greater than 90 BPM and Tachypnea, with respirations greater than 20 per minute  Indicators of septic shock include: Severe sepsis present, persistent hypotension after 30 ml/kg completed, and initial lactate level result is >= 4 mmol/L.   Sources is: urine, possible abdominal  Sepsis protocol initiated  Crystalloid Fluid Administration: Fluid resuscitation ordered per standard protocol - 30 mL/kg per current or ideal body weight  IV antibiotics as appropriate for source of sepsis  Reassessment: I have reassessed the patient's hemodynamic status    - Follow urine and blood cultures, positive  for possible strep bacteremia  - Unremarkable abdominal ct, cxr. Possible left heel wound vs left elbow as infectious source, urine slightly positive for infection  - Empiric cefepime and Flagyl, started 11/5  - Previous urine cultures growing Klebsiella and Enterococcus pansensitive  - Levophed, vasopressin for MAP goal greater than 65  - Continues to be hypotensive, can add stress dose steroids (possible hx of adrenal insufficiency?)  - Infectious disease consulted, appreciate recommendations  - Orthopedic surgery consulted for possible septic arthritis in the left elbow versus infective fluid collection, recommended CT versus MRI to evaluate deeper structures, ordered left upper extremity ultrasound to evaluate any fluid collection  - Wound care    Normal anion gap metabolic acidosis  Assessment & Plan  Normal anion gap acidosis likely secondary to normal saline infusion with drips  - Trend/monitor    Thrombocytopenia (HCC)  Assessment & Plan  Likely reactive secondary to sepsis, trend  - No active signs of bleeding    Lactic acidosis  Assessment & Plan  Elevated in the presence of septic shock  - Trend, fluid resuscitation    Permanent atrial fibrillation (HCC)  Assessment & Plan  Continue home eliquis and amiodarone    Hypothyroidism  Assessment & Plan  Continue home synthroid    Acute renal failure (HCC)  Assessment & Plan  Creatinine 3.13, baseline 1.07  - Likely prerenal vs ischemic ATN  - Hold fluids for now given low EF and elevated BNP  - Trend    Nonischemic cardiomyopathy (HCC)  Assessment & Plan  History of nonischemic cardiomyopathy, unclear etiology however previously reduced ejection fraction-resolved  - Last echo 2021 with EF of 55%  - Repeat echocardiogram ordered    Hypokalemia  Assessment & Plan  Monitor and replace as needed, goal >4  - Caution in acute renal failure    Hyponatremia  Assessment & Plan  Calculated serum osmolality 75, hypotonic likely in the setting of hypovolemia  - Patient  received septic bolus  -Caution fluids given heart failure restriction fraction on repeat echo. 30% EF  - Ensure not to overcorrect sodium, 6 to 8 mEq over 24 hours  - BMP every 8 hours    Obesity due to excess calories with serious comorbidity  Assessment & Plan  Outpatient follow up             Quality Measures:  Feeding:  Soft/bite sized  Analgesia: None  Sedation: None  Thromboprophylaxis: Eliquis  Head of bed: >30 degrees  Ulcer prophylaxis: n/a  Glycemic control: Moderate sliding scale  Bowel care: Bowel regimen  Indwelling lines: PIV  Deescalation of antibiotics: Cefepime, flagyl titrating to cultures and infectious disease      CODE STATUS:   Full code  DISPO:    Pending resolution of shock

## 2022-11-06 NOTE — CARE PLAN
The patient is Unstable - High likelihood or risk of patient condition declining or worsening         Progress made toward(s) clinical / shift goals:    Problem: Knowledge Deficit - Standard  Goal: Patient and family/care givers will demonstrate understanding of plan of care, disease process/condition, diagnostic tests and medications  Outcome: Progressing  Note: POC discussed with the patient. All questions and concerns addressed and answered. Patient is involved in POC and verbalizes the understanding of the disease process, medications, tests and treatments. Questions on POC encouraged throughout care.       Problem: Hemodynamics  Goal: Patient's hemodynamics, fluid balance and neurologic status will be stable or improve  Outcome: Not Progressing  Note: Remains on two pressors       Patient is not progressing towards the following goals:      Problem: Hemodynamics  Goal: Patient's hemodynamics, fluid balance and neurologic status will be stable or improve  Outcome: Not Progressing  Note: Remains on two pressors

## 2022-11-07 ENCOUNTER — HOSPITAL ENCOUNTER (OUTPATIENT)
Dept: RADIOLOGY | Facility: MEDICAL CENTER | Age: 57
End: 2022-11-07
Attending: INTERNAL MEDICINE
Payer: MEDICARE

## 2022-11-07 LAB
ALBUMIN SERPL BCP-MCNC: 2.4 G/DL (ref 3.2–4.9)
ALBUMIN/GLOB SERPL: 0.6 G/DL
ALP SERPL-CCNC: 147 U/L (ref 30–99)
ALT SERPL-CCNC: 59 U/L (ref 2–50)
ANION GAP SERPL CALC-SCNC: 12 MMOL/L (ref 7–16)
ANION GAP SERPL CALC-SCNC: 12 MMOL/L (ref 7–16)
ANION GAP SERPL CALC-SCNC: 13 MMOL/L (ref 7–16)
ANION GAP SERPL CALC-SCNC: 13 MMOL/L (ref 7–16)
AST SERPL-CCNC: 87 U/L (ref 12–45)
BACTERIA BLD CULT: ABNORMAL
BACTERIA UR CULT: NORMAL
BASOPHILS # BLD AUTO: 0 % (ref 0–1.8)
BASOPHILS # BLD: 0 K/UL (ref 0–0.12)
BILIRUB SERPL-MCNC: 2.5 MG/DL (ref 0.1–1.5)
BUN SERPL-MCNC: 65 MG/DL (ref 8–22)
BUN SERPL-MCNC: 68 MG/DL (ref 8–22)
BUN SERPL-MCNC: 70 MG/DL (ref 8–22)
BUN SERPL-MCNC: 72 MG/DL (ref 8–22)
BURR CELLS BLD QL SMEAR: NORMAL
CALCIUM SERPL-MCNC: 8.6 MG/DL (ref 8.5–10.5)
CALCIUM SERPL-MCNC: 8.7 MG/DL (ref 8.5–10.5)
CALCIUM SERPL-MCNC: 8.7 MG/DL (ref 8.5–10.5)
CALCIUM SERPL-MCNC: 8.8 MG/DL (ref 8.5–10.5)
CHLORIDE SERPL-SCNC: 102 MMOL/L (ref 96–112)
CHLORIDE SERPL-SCNC: 104 MMOL/L (ref 96–112)
CHLORIDE SERPL-SCNC: 105 MMOL/L (ref 96–112)
CHLORIDE SERPL-SCNC: 106 MMOL/L (ref 96–112)
CO2 SERPL-SCNC: 16 MMOL/L (ref 20–33)
CO2 SERPL-SCNC: 16 MMOL/L (ref 20–33)
CO2 SERPL-SCNC: 17 MMOL/L (ref 20–33)
CO2 SERPL-SCNC: 17 MMOL/L (ref 20–33)
CREAT SERPL-MCNC: 1.75 MG/DL (ref 0.5–1.4)
CREAT SERPL-MCNC: 1.96 MG/DL (ref 0.5–1.4)
CREAT SERPL-MCNC: 2.04 MG/DL (ref 0.5–1.4)
CREAT SERPL-MCNC: 2.37 MG/DL (ref 0.5–1.4)
EOSINOPHIL # BLD AUTO: 0 K/UL (ref 0–0.51)
EOSINOPHIL NFR BLD: 0 % (ref 0–6.9)
ERYTHROCYTE [DISTWIDTH] IN BLOOD BY AUTOMATED COUNT: 47.5 FL (ref 35.9–50)
ETEST SENSITIVITY ETEST: NORMAL
GFR SERPLBLD CREATININE-BSD FMLA CKD-EPI: 31 ML/MIN/1.73 M 2
GFR SERPLBLD CREATININE-BSD FMLA CKD-EPI: 37 ML/MIN/1.73 M 2
GFR SERPLBLD CREATININE-BSD FMLA CKD-EPI: 38 ML/MIN/1.73 M 2
GFR SERPLBLD CREATININE-BSD FMLA CKD-EPI: 44 ML/MIN/1.73 M 2
GLOBULIN SER CALC-MCNC: 3.8 G/DL (ref 1.9–3.5)
GLUCOSE BLD STRIP.AUTO-MCNC: 170 MG/DL (ref 65–99)
GLUCOSE BLD STRIP.AUTO-MCNC: 177 MG/DL (ref 65–99)
GLUCOSE BLD STRIP.AUTO-MCNC: 184 MG/DL (ref 65–99)
GLUCOSE BLD STRIP.AUTO-MCNC: 185 MG/DL (ref 65–99)
GLUCOSE SERPL-MCNC: 176 MG/DL (ref 65–99)
GLUCOSE SERPL-MCNC: 182 MG/DL (ref 65–99)
GLUCOSE SERPL-MCNC: 191 MG/DL (ref 65–99)
GLUCOSE SERPL-MCNC: 193 MG/DL (ref 65–99)
HCT VFR BLD AUTO: 33.6 % (ref 42–52)
HGB BLD-MCNC: 11.5 G/DL (ref 14–18)
LYMPHOCYTES # BLD AUTO: 0.14 K/UL (ref 1–4.8)
LYMPHOCYTES NFR BLD: 0.9 % (ref 22–41)
MAGNESIUM SERPL-MCNC: 2.3 MG/DL (ref 1.5–2.5)
MANUAL DIFF BLD: NORMAL
MCH RBC QN AUTO: 29.9 PG (ref 27–33)
MCHC RBC AUTO-ENTMCNC: 34.2 G/DL (ref 33.7–35.3)
MCV RBC AUTO: 87.3 FL (ref 81.4–97.8)
MONOCYTES # BLD AUTO: 0.14 K/UL (ref 0–0.85)
MONOCYTES NFR BLD AUTO: 0.9 % (ref 0–13.4)
MORPHOLOGY BLD-IMP: NORMAL
NEUTROPHILS # BLD AUTO: 14.83 K/UL (ref 1.82–7.42)
NEUTROPHILS NFR BLD: 98.2 % (ref 44–72)
NRBC # BLD AUTO: 0.02 K/UL
NRBC BLD-RTO: 0.1 /100 WBC
PHOSPHATE SERPL-MCNC: 2.9 MG/DL (ref 2.5–4.5)
PLATELET # BLD AUTO: 121 K/UL (ref 164–446)
PLATELET BLD QL SMEAR: NORMAL
PMV BLD AUTO: 12.3 FL (ref 9–12.9)
POIKILOCYTOSIS BLD QL SMEAR: NORMAL
POTASSIUM SERPL-SCNC: 3 MMOL/L (ref 3.6–5.5)
POTASSIUM SERPL-SCNC: 3 MMOL/L (ref 3.6–5.5)
POTASSIUM SERPL-SCNC: 3.2 MMOL/L (ref 3.6–5.5)
POTASSIUM SERPL-SCNC: 3.2 MMOL/L (ref 3.6–5.5)
PROT SERPL-MCNC: 6.2 G/DL (ref 6–8.2)
RBC # BLD AUTO: 3.85 M/UL (ref 4.7–6.1)
RBC BLD AUTO: PRESENT
SIGNIFICANT IND 70042: ABNORMAL
SIGNIFICANT IND 70042: ABNORMAL
SIGNIFICANT IND 70042: NORMAL
SITE SITE: ABNORMAL
SITE SITE: ABNORMAL
SITE SITE: NORMAL
SODIUM SERPL-SCNC: 131 MMOL/L (ref 135–145)
SODIUM SERPL-SCNC: 133 MMOL/L (ref 135–145)
SODIUM SERPL-SCNC: 134 MMOL/L (ref 135–145)
SODIUM SERPL-SCNC: 135 MMOL/L (ref 135–145)
SOURCE SOURCE: ABNORMAL
SOURCE SOURCE: ABNORMAL
SOURCE SOURCE: NORMAL
WBC # BLD AUTO: 15.1 K/UL (ref 4.8–10.8)

## 2022-11-07 PROCEDURE — 700111 HCHG RX REV CODE 636 W/ 250 OVERRIDE (IP): Performed by: INTERNAL MEDICINE

## 2022-11-07 PROCEDURE — 700105 HCHG RX REV CODE 258: Performed by: STUDENT IN AN ORGANIZED HEALTH CARE EDUCATION/TRAINING PROGRAM

## 2022-11-07 PROCEDURE — 770022 HCHG ROOM/CARE - ICU (200)

## 2022-11-07 PROCEDURE — 700102 HCHG RX REV CODE 250 W/ 637 OVERRIDE(OP): Performed by: INTERNAL MEDICINE

## 2022-11-07 PROCEDURE — 80048 BASIC METABOLIC PNL TOTAL CA: CPT | Mod: 91

## 2022-11-07 PROCEDURE — 85025 COMPLETE CBC W/AUTO DIFF WBC: CPT

## 2022-11-07 PROCEDURE — A9270 NON-COVERED ITEM OR SERVICE: HCPCS | Performed by: INTERNAL MEDICINE

## 2022-11-07 PROCEDURE — 97163 PT EVAL HIGH COMPLEX 45 MIN: CPT

## 2022-11-07 PROCEDURE — 82962 GLUCOSE BLOOD TEST: CPT | Mod: 91

## 2022-11-07 PROCEDURE — 700105 HCHG RX REV CODE 258: Performed by: INTERNAL MEDICINE

## 2022-11-07 PROCEDURE — 80053 COMPREHEN METABOLIC PANEL: CPT

## 2022-11-07 PROCEDURE — 700111 HCHG RX REV CODE 636 W/ 250 OVERRIDE (IP): Performed by: STUDENT IN AN ORGANIZED HEALTH CARE EDUCATION/TRAINING PROGRAM

## 2022-11-07 PROCEDURE — 99233 SBSQ HOSP IP/OBS HIGH 50: CPT | Performed by: INTERNAL MEDICINE

## 2022-11-07 PROCEDURE — 87040 BLOOD CULTURE FOR BACTERIA: CPT

## 2022-11-07 PROCEDURE — 85007 BL SMEAR W/DIFF WBC COUNT: CPT

## 2022-11-07 PROCEDURE — 84100 ASSAY OF PHOSPHORUS: CPT

## 2022-11-07 PROCEDURE — 99291 CRITICAL CARE FIRST HOUR: CPT | Performed by: INTERNAL MEDICINE

## 2022-11-07 PROCEDURE — 83735 ASSAY OF MAGNESIUM: CPT

## 2022-11-07 PROCEDURE — 700101 HCHG RX REV CODE 250: Performed by: STUDENT IN AN ORGANIZED HEALTH CARE EDUCATION/TRAINING PROGRAM

## 2022-11-07 PROCEDURE — A9270 NON-COVERED ITEM OR SERVICE: HCPCS | Performed by: STUDENT IN AN ORGANIZED HEALTH CARE EDUCATION/TRAINING PROGRAM

## 2022-11-07 PROCEDURE — 700102 HCHG RX REV CODE 250 W/ 637 OVERRIDE(OP): Performed by: STUDENT IN AN ORGANIZED HEALTH CARE EDUCATION/TRAINING PROGRAM

## 2022-11-07 PROCEDURE — 71045 X-RAY EXAM CHEST 1 VIEW: CPT

## 2022-11-07 RX ORDER — POTASSIUM CHLORIDE 7.45 MG/ML
10 INJECTION INTRAVENOUS ONCE
Status: DISCONTINUED | OUTPATIENT
Start: 2022-11-07 | End: 2022-11-07

## 2022-11-07 RX ORDER — POTASSIUM CHLORIDE 14.9 MG/ML
20 INJECTION INTRAVENOUS ONCE
Status: COMPLETED | OUTPATIENT
Start: 2022-11-07 | End: 2022-11-07

## 2022-11-07 RX ORDER — POTASSIUM CHLORIDE 7.45 MG/ML
10 INJECTION INTRAVENOUS ONCE
Status: COMPLETED | OUTPATIENT
Start: 2022-11-07 | End: 2022-11-07

## 2022-11-07 RX ORDER — AMIODARONE HYDROCHLORIDE 200 MG/1
200 TABLET ORAL DAILY
Status: DISCONTINUED | OUTPATIENT
Start: 2022-11-17 | End: 2022-11-17 | Stop reason: HOSPADM

## 2022-11-07 RX ORDER — POTASSIUM CHLORIDE 29.8 MG/ML
40 INJECTION INTRAVENOUS ONCE
Status: COMPLETED | OUTPATIENT
Start: 2022-11-07 | End: 2022-11-07

## 2022-11-07 RX ORDER — AMIODARONE HYDROCHLORIDE 200 MG/1
400 TABLET ORAL TWICE DAILY
Status: COMPLETED | OUTPATIENT
Start: 2022-11-07 | End: 2022-11-11

## 2022-11-07 RX ORDER — AMIODARONE HYDROCHLORIDE 200 MG/1
400 TABLET ORAL
Status: COMPLETED | OUTPATIENT
Start: 2022-11-12 | End: 2022-11-16

## 2022-11-07 RX ORDER — POTASSIUM CHLORIDE 7.45 MG/ML
10 INJECTION INTRAVENOUS
Status: DISCONTINUED | OUTPATIENT
Start: 2022-11-07 | End: 2022-11-07

## 2022-11-07 RX ORDER — POTASSIUM CHLORIDE 14.9 MG/ML
20 INJECTION INTRAVENOUS ONCE
Status: DISCONTINUED | OUTPATIENT
Start: 2022-11-07 | End: 2022-11-07

## 2022-11-07 RX ADMIN — APIXABAN 5 MG: 5 TABLET, FILM COATED ORAL at 05:45

## 2022-11-07 RX ADMIN — INSULIN HUMAN 2 UNITS: 100 INJECTION, SOLUTION PARENTERAL at 21:42

## 2022-11-07 RX ADMIN — POTASSIUM CHLORIDE 20 MEQ: 14.9 INJECTION, SOLUTION INTRAVENOUS at 18:46

## 2022-11-07 RX ADMIN — APIXABAN 5 MG: 5 TABLET, FILM COATED ORAL at 18:46

## 2022-11-07 RX ADMIN — DAPTOMYCIN 1110 MG: 500 INJECTION, POWDER, LYOPHILIZED, FOR SOLUTION INTRAVENOUS at 13:30

## 2022-11-07 RX ADMIN — TRAZODONE HYDROCHLORIDE 50 MG: 50 TABLET ORAL at 21:29

## 2022-11-07 RX ADMIN — LEVOTHYROXINE SODIUM 150 MCG: 0.07 TABLET ORAL at 05:45

## 2022-11-07 RX ADMIN — INSULIN HUMAN 2 UNITS: 100 INJECTION, SOLUTION PARENTERAL at 17:23

## 2022-11-07 RX ADMIN — INSULIN HUMAN 2 UNITS: 100 INJECTION, SOLUTION PARENTERAL at 06:20

## 2022-11-07 RX ADMIN — POTASSIUM CHLORIDE 10 MEQ: 7.46 INJECTION, SOLUTION INTRAVENOUS at 21:30

## 2022-11-07 RX ADMIN — POTASSIUM CHLORIDE 40 MEQ: 29.8 INJECTION, SOLUTION INTRAVENOUS at 02:30

## 2022-11-07 RX ADMIN — HYDROCORTISONE SODIUM SUCCINATE 50 MG: 100 INJECTION, POWDER, FOR SOLUTION INTRAMUSCULAR; INTRAVENOUS at 00:25

## 2022-11-07 RX ADMIN — AMIODARONE HYDROCHLORIDE 0.5 MG/MIN: 1.8 INJECTION, SOLUTION INTRAVENOUS at 07:28

## 2022-11-07 RX ADMIN — METRONIDAZOLE 500 MG: 500 INJECTION, SOLUTION INTRAVENOUS at 05:45

## 2022-11-07 RX ADMIN — HYDROCORTISONE SODIUM SUCCINATE 50 MG: 100 INJECTION, POWDER, FOR SOLUTION INTRAMUSCULAR; INTRAVENOUS at 05:45

## 2022-11-07 RX ADMIN — INSULIN HUMAN 2 UNITS: 100 INJECTION, SOLUTION PARENTERAL at 11:39

## 2022-11-07 RX ADMIN — AMIODARONE HYDROCHLORIDE 400 MG: 200 TABLET ORAL at 09:34

## 2022-11-07 RX ADMIN — HYDROCORTISONE SODIUM SUCCINATE 50 MG: 100 INJECTION, POWDER, FOR SOLUTION INTRAMUSCULAR; INTRAVENOUS at 11:06

## 2022-11-07 RX ADMIN — CEFEPIME 2 G: 2 INJECTION, POWDER, FOR SOLUTION INTRAVENOUS at 05:45

## 2022-11-07 RX ADMIN — POTASSIUM CHLORIDE 10 MEQ: 7.46 INJECTION, SOLUTION INTRAVENOUS at 14:22

## 2022-11-07 RX ADMIN — HYDROCORTISONE SODIUM SUCCINATE 50 MG: 100 INJECTION, POWDER, FOR SOLUTION INTRAMUSCULAR; INTRAVENOUS at 17:23

## 2022-11-07 RX ADMIN — POTASSIUM CHLORIDE 20 MEQ: 14.9 INJECTION, SOLUTION INTRAVENOUS at 11:07

## 2022-11-07 RX ADMIN — AMIODARONE HYDROCHLORIDE 400 MG: 200 TABLET ORAL at 18:46

## 2022-11-07 RX ADMIN — CEFAZOLIN 2 G: 2 INJECTION, POWDER, FOR SOLUTION INTRAMUSCULAR; INTRAVENOUS at 15:28

## 2022-11-07 RX ADMIN — NYSTATIN: 100000 POWDER TOPICAL at 06:18

## 2022-11-07 RX ADMIN — CEFAZOLIN 2 G: 2 INJECTION, POWDER, FOR SOLUTION INTRAMUSCULAR; INTRAVENOUS at 21:35

## 2022-11-07 RX ADMIN — NYSTATIN: 100000 POWDER TOPICAL at 17:23

## 2022-11-07 ASSESSMENT — ENCOUNTER SYMPTOMS
PALPITATIONS: 0
DIARRHEA: 0
DIZZINESS: 0
NAUSEA: 0
SHORTNESS OF BREATH: 0
FEVER: 0
HEADACHES: 0
VOMITING: 0
ABDOMINAL PAIN: 0
WHEEZING: 0
CHILLS: 0
COUGH: 0

## 2022-11-07 ASSESSMENT — COGNITIVE AND FUNCTIONAL STATUS - GENERAL
SUGGESTED CMS G CODE MODIFIER MOBILITY: CM
MOVING TO AND FROM BED TO CHAIR: UNABLE
TURNING FROM BACK TO SIDE WHILE IN FLAT BAD: A LOT
CLIMB 3 TO 5 STEPS WITH RAILING: TOTAL
STANDING UP FROM CHAIR USING ARMS: A LOT
MOBILITY SCORE: 8
WALKING IN HOSPITAL ROOM: TOTAL
MOVING FROM LYING ON BACK TO SITTING ON SIDE OF FLAT BED: UNABLE

## 2022-11-07 ASSESSMENT — PAIN DESCRIPTION - PAIN TYPE: TYPE: ACUTE PAIN

## 2022-11-07 ASSESSMENT — GAIT ASSESSMENTS: GAIT LEVEL OF ASSIST: UNABLE TO PARTICIPATE

## 2022-11-07 NOTE — PROGRESS NOTES
Superficial infectious Disease Progress Note    Author: Rai Rodriguez M.D. Date & Time of service: 2022  9:51 AM    Chief Complaint:  Follow-up for bacteremia    Interval History:   T-max 102.6 on , white count up to 15.1 today, on cefepime and Flagyl.  Continues to have left elbow pain    Labs Reviewed and Medications Reviewed.    Review of Systems:  Review of Systems   Constitutional:  Negative for chills and fever.   Gastrointestinal:  Negative for abdominal pain, diarrhea, nausea and vomiting.   Musculoskeletal:  Positive for joint pain.   Skin:  Negative for itching and rash.   All other systems reviewed and are negative.    Hemodynamics:  No data recorded.  Monitored Temp: 36.5 °C (97.7 °F)  Pulse  Av.5  Min: 85  Max: 140   Blood Pressure: 109/55       Physical Exam:  Physical Exam  Vitals and nursing note reviewed.   Constitutional:       General: He is not in acute distress.     Appearance: He is ill-appearing.      Comments: Chronically ill-appearing   HENT:      Mouth/Throat:      Comments: Poor dentition  Eyes:      General: No scleral icterus.        Right eye: No discharge.         Left eye: No discharge.      Conjunctiva/sclera: Conjunctivae normal.   Cardiovascular:      Rate and Rhythm: Normal rate and regular rhythm.      Heart sounds: No murmur heard.  Pulmonary:      Effort: Pulmonary effort is normal. No respiratory distress.      Breath sounds: No stridor.   Abdominal:      General: Abdomen is flat. There is no distension.      Tenderness: There is no abdominal tenderness.   Musculoskeletal:         General: Swelling and tenderness present.      Comments: Left posterior elbow swelling with erythema, pain with passive motion, increased warmth   Skin:     Findings: Erythema present.   Neurological:      General: No focal deficit present.      Mental Status: He is alert and oriented to person, place, and time.   Psychiatric:      Comments: Flat affect       Meds:    Current  Facility-Administered Medications:     amiodarone **FOLLOWED BY** [START ON 11/12/2022] amiodarone **FOLLOWED BY** [START ON 11/17/2022] amiodarone    K+ Scale: Goal of 4.5    insulin regular **AND** POC blood glucose manual result **AND** NOTIFY MD and PharmD **AND** Administer 20 grams of glucose (approximately 8 ounces of fruit juice) every 15 minutes PRN FSBG less than 70 mg/dL **AND** dextrose bolus    DAPTOmycin    acetaminophen    cefepime    metroNIDAZOLE (FLAGYL) IV    norepinephrine (Levophed) infusion **AND** vasopressin (PITRESSIN) infusion **AND** Notify physician if MAP remains less than 65 mmHg after 15 minutes of Norepinephrine and Vasopressin initiation **AND** Initial Vasopressor Therapy for Septic Shock    levothyroxine    apixaban    traZODone    senna-docusate **AND** polyethylene glycol/lytes **AND** magnesium hydroxide **AND** bisacodyl    hydrocortisone sodium succinate PF    nystatin    Labs:  Recent Labs     11/05/22  0326 11/06/22  0502 11/07/22  0605   WBC 11.4* 14.6* 15.1*   RBC 4.07* 3.97* 3.85*   HEMOGLOBIN 12.2* 11.9* 11.5*   HEMATOCRIT 36.3* 35.3* 33.6*   MCV 89.2 88.9 87.3   MCH 30.0 30.0 29.9   RDW 46.3 48.3 47.5   PLATELETCT 99* 101* 121*   MPV 13.3* 12.1 12.3   NEUTSPOLYS 88.90*  --  98.20*   LYMPHOCYTES 2.60*  --  0.90*   MONOCYTES 4.30  --  0.90   EOSINOPHILS 0.00  --  0.00   BASOPHILS 0.80  --  0.00   RBCMORPHOLO Present  --  Present     Recent Labs     11/06/22  1230 11/06/22  1700 11/07/22  0029 11/07/22  0605   SODIUM 129* 130* 131* 133*   POTASSIUM 3.0* 3.0* 3.0* 3.0*   CHLORIDE 98 100 102 104   CO2 15* 15* 16* 16*   GLUCOSE 165* 173* 193* 191*   BUN 73* 70* 72* 70*   CPKTOTAL 596*  --   --   --      Recent Labs     11/05/22  0414 11/05/22  0840 11/06/22  0502 11/06/22  1230 11/06/22  1700 11/07/22  0029 11/07/22  0605   ALBUMIN 2.9*  --  2.6*  --   --   --  2.4*   TBILIRUBIN 3.5*  --  3.3*  --   --   --  2.5*   ALKPHOSPHAT 108*  --  114*  --   --   --  147*   TOTPROTEIN  7.0  --  6.5  --   --   --  6.2   ALTSGPT 35  --  56*  --   --   --  59*   ASTSGOT 48*  --  97*  --   --   --  87*   CREATININE 3.13*   < > 3.04*   < > 2.59* 2.37* 2.04*    < > = values in this interval not displayed.       Imaging:  CT-ABDOMEN-PELVIS W/O    Result Date: 11/5/2022 11/5/2022 9:05 AM HISTORY/REASON FOR EXAM:  Abdominal distention. TECHNIQUE/EXAM DESCRIPTION: CT scan of the abdomen and pelvis without contrast. Noncontrast helical scanning was obtained from the diaphragmatic domes through the pubic symphysis. Low dose optimization technique was utilized for this CT exam including automated exposure control and adjustment of the mA and/or kV according to patient size. COMPARISON: None. FINDINGS: Lower Chest: There is bibasilar atelectasis.. Liver: Normal. Spleen: Calcified granulomas. Pancreas: Atrophic. Gallbladder: There are multiple gallstones. Biliary: Nondilated. Adrenal glands: Normal. Kidneys: Unremarkable without hydronephrosis. Bowel: The colon is mildly distended and contains fluid.. Lymph nodes: No adenopathy. Vasculature: Unremarkable. Peritoneum: Unremarkable without ascites. Musculoskeletal: There are chronic compression fractures of the spine.. Pelvis: No adenopathy or free fluid.     1.  No evidence of bowel obstruction or focal inflammatory change. 2.  Gallstones. 3.  Mildly distended colon which also contains fluid. No evidence of bowel obstruction.    DX-CHEST-PORTABLE (1 VIEW)    Result Date: 11/7/2022 11/7/2022 12:04 AM HISTORY/REASON FOR EXAM:  sepsis. TECHNIQUE/EXAM DESCRIPTION AND NUMBER OF VIEWS: Single portable view of the chest. COMPARISON:  11/5/2022. FINDINGS: LUNGS: Linear atelectasis in the right lower lung. No focal consolidation. Persistent interstitial edema. No effusions. PNEUMOTHORAX: None. LINES AND TUBES: Stable right IJ central catheter. MEDIASTINUM: Stable cardiac silhouette. MUSCULOSKELETAL STRUCTURES: Unchanged.     1.  Stable cardiomegaly and interstitial  edema. 2.  Stable linear atelectasis in the right lower lung.     DX-CHEST-PORTABLE (1 VIEW)    Result Date: 11/5/2022 11/5/2022 8:17 AM HISTORY/REASON FOR EXAM: Central line placement TECHNIQUE/EXAM DESCRIPTION AND NUMBER OF VIEWS: Single portable view of the chest. COMPARISON: 11/5/2022 FINDINGS: There is a right IJ central line present with the tip overlying the upper right atrium. No evidence of pneumothorax. There is interstitial edema. There is linear atelectasis within the right midlung. There is no pleural effusion. The heart is enlarged.     1.  Cardiomegaly. There is also interstitial edema. 2.  Right IJ central line present without evidence of complication. 3.  Linear atelectasis within the right lung base.    DX-CHEST-PORTABLE (1 VIEW)    Result Date: 11/5/2022 11/5/2022 3:40 AM HISTORY/REASON FOR EXAM:  Sepsis TECHNIQUE/EXAM DESCRIPTION AND NUMBER OF VIEWS: Single portable view of the chest. COMPARISON: 5/25/2021 FINDINGS: HEART: Not enlarged. LUNGS: Hazy opacity right lower lung. PLEURA: No effusion or pneumothorax.     1.  Hazy opacity at the right lower lung, similar to 5/25/2021 exam. Scarring versus pneumonia.    DX-ELBOW-COMPLETE 3+ LEFT    Result Date: 11/5/2022 11/5/2022 11:29 AM HISTORY/REASON FOR EXAM:  Pain/Deformity Following Trauma TECHNIQUE/EXAM DESCRIPTION AND NUMBER OF VIEWS:  3 views of the LEFT elbow. COMPARISON: None FINDINGS: There is no evidence of joint effusion. There is no evidence of displaced fracture or dislocation. There is no focal soft tissue swelling.     Normal elbow series.    US-EXTREMITY NON VASCULAR UNILATERAL LEFT    Result Date: 11/6/2022 11/6/2022 12:21 PM HISTORY/REASON FOR EXAM:  Left elbow swelling, redness, pain TECHNIQUE/EXAM DESCRIPTION AND NUMBER OF VIEWS: Ultrasound extremity nonvascular unilateral COMPARISON: X-ray yesterday FINDINGS: There is edema. No fluid collection identified.     Soft tissue edema in the area of concern about the left elbow  without evidence of fluid collection/abscess.    EC-ECHOCARDIOGRAM COMPLETE W/ CONT    Result Date: 2022  Transthoracic Echo Report Echocardiography Laboratory CONCLUSIONS Severely reduced left ventricular systolic function. The left ventricular ejection fraction is visually estimated to be 25- 30%, but may be underestimated due to tachycardia. Global hypokinesis with more pronounced hypokinesis of the anterior and anterolateral walls. Diastolic function is abnormal, but grade cannot be determined. The right ventricle is not well visualized, but appears dilated with reduced systolic function. Biatrial dilation. Mild to moderate tricuspid regurgitation. Dilated inferior vena cava without inspiratory collapse. EMELY FLORES Exam Date:         2022                    11:46 Exam Location:     Inpatient Priority:          Routine Ordering Physician:        LESLIE ARMENTA Referring Physician: Sonographer:               Sheron Baxter RDCS Age:    57     Gender:    M MRN:    [id number] :    1965 BSA:    2.49   Ht (in):    72     Wt (lb):    290 Exam Type:     Complete, Contrast Indications:     Heart Failure, Systolic ICD Codes:       428.2 CPT Codes:       68729,  BP:   90     /   52     HR:   120 Technical Quality:       Fair MEASUREMENTS  (Male / Female) Normal Values 2D ECHO LV Diastolic Diameter PLAX        5.1 cm                4.2 - 5.9 / 3.9 - 5.3 cm LV Systolic Diameter PLAX         4.8 cm                2.1 - 4.0 cm IVS Diastolic Thickness           1 cm                  LVPW Diastolic Thickness          0.83 cm               LVOT Diameter                     2.2 cm                Estimated LV Ejection Fraction    30 %                  LV Ejection Fraction MOD BP       39.3 %                >= 55  % LV Ejection Fraction MOD 4C       41.2 %                LV Ejection Fraction MOD 2C       36.1 %                IVC Diameter                      2.5 cm                DOPPLER AV Peak  Velocity                  1.2 m/s               AV Peak Gradient                  6 mmHg                AV Mean Gradient                  3.8 mmHg              LVOT Peak Velocity                0.85 m/s              AV Area Cont Eq vti               2.4 cm2               TV Peak E Velocity                0.51 m/s              TR Peak Velocity                  290 cm/s              * Indicates values subject to auto-interpretation LV EF:  30    % FINDINGS Left Ventricle Normal left ventricular chamber size. Normal left ventricular wall thickness. Severely reduced left ventricular systolic function. The left ventricular ejection fraction is visually estimated to be 25-30%, but may be underestimated due to tachycardia. Global hypokinesis with more pronounced hypokinesis of the anterior and anterolateral walls. Diastolic function is abnormal, but grade cannot be determined. 3 mL of contrast was used to enhance visualization of the endocardial border. Existing IV was used at the left elbow. Right Ventricle The right ventricle is not well visualized, but appears dilated with reduced systolic function. Estimated right ventricular systolic pressure is 50 mmHg Right Atrium The right atrium is not well visualized, but appears dilated. Dilated inferior vena cava without inspiratory collapse. Left Atrium The left atrium is not well visualized, but appears dilated. Mitral Valve The mitral valve is not well visualized. No stenosis or regurgitation seen. Aortic Valve The aortic valve is not well visualized. No stenosis or regurgitation seen. Tricuspid Valve No tricuspid stenosis. Mild to moderate tricuspid regurgitation. Pulmonic Valve Structurally normal pulmonic valve without significant stenosis or regurgitation. Pericardium Normal pericardium without effusion. Aorta Normal aortic root for body surface area. The ascending aorta diameter is 3.5 cm. Michael Acosta MD (Electronically Signed) Final Date:     05 November 2022                  14:02      Micro:  Results       Procedure Component Value Units Date/Time    Urine Culture (NEW) [[id number]] Collected: 11/05/22 0500    Order Status: Completed Specimen: Urine Updated: 11/07/22 0950     Significant Indicator NEG     Source UR     Site -     Culture Result No growth at 48 hours.    Narrative:      Indication for culture:->Evaluation for sepsis without a  clear source of infection  Indication for culture:->Evaluation for sepsis without a    EHEC(Shiga Toxin)Detection [[id number]] Collected: 11/05/22 0450    Order Status: Completed Specimen: Stool Updated: 11/06/22 1531     Significant Indicator NEG     Source STL     Site STOOL     EHEC Negative for Shiga Toxin 1 and 2.    Narrative:      For adult patients only; culture includes screen for  Campylobacter.  For adult patients only; culture includes screen for    CULTURE STOOL [[id number]] Collected: 11/05/22 0450    Order Status: Completed Specimen: Stool Updated: 11/06/22 1529     Significant Indicator NEG     Source STL     Site STOOL     Culture Result Culture in progress.    NOTE:    Stool cultures are screened for Shiga Toxins 1 and 2,    Salmonella, Shigella, Campylobacter, Aeromonas,    Plesiomonas, and Vibrio.       EHEC Negative for Shiga Toxin 1 and 2.     Campylobactor Antigen --     Negative for Campylobacter Antigen.  Test results are to be used in conjunction with information  available from the patient clinical evaluation and other  diagnostic procedures.      Narrative:      For adult patients only; culture includes screen for  Campylobacter.  For adult patients only; culture includes screen for    Blood Culture [[id number]]  (Abnormal) Collected: 11/05/22 0414    Order Status: Completed Specimen: Blood from Peripheral Updated: 11/05/22 1545     Significant Indicator POS     Source BLD     Site PERIPHERAL     Culture Result Growth detected by Bactec instrument. 11/05/2022  15:42  Gram Stain: Gram positive cocci: Possible  "Streptococcus sp.      Narrative:      CALL  [place]  161 tel. [phone],  CALLED  161 tel. [phone] 11/05/2022, 15:45, RB PERF. RESULTS CALLED TO: rn  [id number]  1 of 2 for Blood Culture x 2 sites order. Per Hospital  Policy: Only change Specimen Src: to \"Line\" if specified by  physician order.  Left Hand    MRSA By PCR (Amp) [[id number]] Collected: 11/05/22 1156    Order Status: Completed Specimen: Respirate from Nares Updated: 11/05/22 1433     MRSA by PCR POSITIVE    Narrative:      Collected By: [id number] AUGUSTNI BALL    Blood Culture [[id number]]  (Abnormal) Collected: 11/05/22 0415    Order Status: Completed Specimen: Blood from Peripheral Updated: 11/05/22 1429     Significant Indicator POS     Source BLD     Site PERIPHERAL     Culture Result Growth detected by Bactec instrument. 11/05/2022  14:27  Gram Stain: Gram positive cocci: Possible Streptococcus sp.      Narrative:      CALL  [place]  161 tel. [phone],  CALLED  161 tel. [phone] 11/05/2022, 14:28, RB PERF. RESULTS CALLED TO: rn  [id number]  2 of 2 blood culture x2  Sites order. Per Hospital Policy:  Only change Specimen Src: to \"Line\" if specified by physician  order.  Right Wrist    C Diff by PCR rflx Toxin [[id number]] Collected: 11/05/22 0450    Order Status: Completed Specimen: Stool Updated: 11/05/22 0804     C Diff by PCR Negative     Comment: C. difficile NOT detected by PCR.  Treatment not indicated per guidelines.  Repeat testing not indicated within 7 days.          027-NAP1-BI Presumptive Negative     Comment: Presumptive 027/NAP1/BI target DNA sequences are NOT DETECTED.       Narrative:      Does this patient have risk factors for C-diff?->Yes  C-Diff Risk Factors->immunocompromising conditions    CoV-2, FLU A/B, and RSV by PCR (2-4 Hours CEPHEID) : Collect NP swab in VTM [[id number]] Collected: 11/05/22 0507    Order Status: Completed Specimen: Respirate Updated: 11/05/22 0606     Influenza virus A RNA Negative     Influenza virus B, PCR " "Negative     RSV, PCR Negative     SARS-CoV-2 by PCR NotDetected     Comment: PATIENTS: Important information regarding your results and instructions can  be found at [email or web address]   \"After your  Covid-19 Test\"    [facility] providers: PLEASE REFER TO DE-ESCALATION AND RETESTING PROTOCOL  on [email or web address]    **The [employer] GeneXpert Xpress SARS-CoV-2 RT-PCR Test has been made  available for use under the Emergency Use Authorization (EUA) only.          SARS-CoV-2 Source NP Swab    Urinalysis [[id number]]  (Abnormal) Collected: 11/05/22 0500    Order Status: Completed Specimen: Urine Updated: 11/05/22 0555     Color DK Yellow     Character Turbid     Specific Gravity 1.017     Ph 5.0     Glucose 100 mg/dL      Ketones Trace mg/dL      Protein 30 mg/dL      Bilirubin Small     Urobilinogen, Urine 1.0     Nitrite Positive     Leukocyte Esterase Small     Occult Blood Negative     Micro Urine Req Microscopic    Narrative:      Indication for culture:->Evaluation for sepsis without a  clear source of infection            Assessment:  This is a 57-year-old male patient with type 2 diabetes, nonischemic cardiomyopathy, A. fib on amiodarone and Eliquis, prior hospitalization for bowel perforation, presented with 4 days of rigors, watery diarrhea, fevers has a chronic superficial left heel wound, was found to be in septic shock with acute renal failure and lactic acidosis, blood cultures positive for GPC in chains.  CT scan of the abdomen pelvis with no inflammation.  Chest x-ray with interstitial opacities.  TTE with EF of 25 to 30%, no obvious valvular vegetations were noted.    Plan:  -Blood cultures positive for Group A Strep  -Multiple allergies including Unasyn, Zosyn.  Discontinue daptomycin, cefepime, Flagyl, start IV Ancef 2 g every 8 hours  -Additional imaging of the left elbow pending -exam is concerning for septic arthritis that may need washout  -Repeat blood cultures " x2    Discussed with internal medicine, Dr. Johnson

## 2022-11-07 NOTE — DISCHARGE PLANNING
Case Management Discharge Planning    Admission Date: 11/5/2022  GMLOS: 5  ALOS: 2    6-Clicks ADL Score:    6-Clicks Mobility Score:    PT and/or OT Eval ordered: Yes  Post-acute Referrals Ordered: No  Post-acute Choice Obtained: No  Has referral(s) been sent to post-acute provider:  No      Anticipated Discharge Dispo: Discharge Disposition: Disch to a long term care facility (61)  Discharge Address: [facility]    DME Needed: No    Action(s) Taken: DC Assessment Complete (See below) CM met with patient at bedside, patient is alert and knows he is in the hospital where he lives and  how long he has lived there.  Patient verbalizes he has been at [facility] for about a year maybe just a little over that. Patient verbalizes that he was using a walker prior and was able to go about the facility needed some help for bathing.  Patient verbalizes his brother lives kind of far in [place]. Patient plans to return back to [facility] when he is ready to discharge home and he says he does get some therapy there.     Escalations Completed: None    Medically Clear: No    Next Steps: Patient most like will go back to [facility] on skilled services.     Barriers to Discharge: Medical clearance    Is the patient up for discharge tomorrow: No

## 2022-11-07 NOTE — PROGRESS NOTES
[facility] Progress Note      Admit Date: 11/5/2022  ICU Day:  [] 2      Resident(s): Andressa Shin M.D.  Attending: QUYEN HANCOCK/ Dr. Johnson    Date & Time:   11/7/2022   10:22 AM       Patient ID:    Name:             Sebastián Castro     YOB: 1965  Age:                 57 y.o.  male   MRN:               [id number]    HPI:  Patient is a 57 year-old male with a past medical history of nonischemic cardiomyopathy, permanent atrial fibrillation (on amiodarone and Eliquis), insulin-dependent type 2 diabetes mellitus, and previous history of hospitalization for bowel perforation s/p exploratory laparotomy admitted for septic shock with strep bacteremia (unknown source), on cefepime, Flagyl, and daptomycin per infectious disease. Infectious disease consulted for possible source being UTI versus soft tissue/skin infection.    Consultants:  Infectious disease    Procedures:  None    Imaging:  DX-CHEST-PORTABLE (1 VIEW)   Final Result      1.  Stable cardiomegaly and interstitial edema.   2.  Stable linear atelectasis in the right lower lung.         US-EXTREMITY NON VASCULAR UNILATERAL LEFT   Final Result      Soft tissue edema in the area of concern about the left elbow without evidence of fluid collection/abscess.      EC-ECHOCARDIOGRAM COMPLETE W/ CONT   Final Result      DX-ELBOW-COMPLETE 3+ LEFT   Final Result      Normal elbow series.      CT-ABDOMEN-PELVIS W/O   Final Result      1.  No evidence of bowel obstruction or focal inflammatory change.      2.  Gallstones.      3.  Mildly distended colon which also contains fluid. No evidence of bowel obstruction.      DX-CHEST-PORTABLE (1 VIEW)   Final Result      1.  Cardiomegaly. There is also interstitial edema.      2.  Right IJ central line present without evidence of complication.      3.  Linear atelectasis within the right lung base.      DX-CHEST-PORTABLE (1 VIEW)   Final Result      1.  Hazy opacity at the right lower lung, similar  "to 5/25/2021 exam. Scarring versus pneumonia.          Interval Events:  11/7/22 - No acute events overnight. Left upper extremity ultrasound showed \"soft tissue edema, without evidence of fluid collection/abscess\". Will continue antibiotics, per ID. Shock improving, vitals are good - will down-titrate norepinephine. Kidney function improving. Amiodarone changed from IV to enteral preparation.    Review of Systems   Constitutional:  Negative for chills and fever.   Respiratory:  Negative for cough, shortness of breath and wheezing.    Cardiovascular:  Negative for chest pain and palpitations.   Gastrointestinal:  Negative for abdominal pain, nausea and vomiting.   Genitourinary:  Negative for dysuria.   Musculoskeletal:  Positive for joint pain.   Neurological:  Negative for dizziness and headaches.     PHYSICAL EXAM    Physical Exam  Constitutional:       General: He is not in acute distress.  HENT:      Head: Normocephalic and atraumatic.      Right Ear: External ear normal.      Left Ear: External ear normal.      Nose: Nose normal.      Mouth/Throat:      Mouth: Mucous membranes are dry.   Eyes:      Extraocular Movements: Extraocular movements intact.      Conjunctiva/sclera: Conjunctivae normal.   Cardiovascular:      Rate and Rhythm: Rhythm irregular.      Heart sounds: No murmur heard.  Pulmonary:      Effort: Pulmonary effort is normal. No respiratory distress.      Breath sounds: No wheezing.   Abdominal:      Palpations: Abdomen is soft.      Tenderness: There is no abdominal tenderness. There is no guarding.   Musculoskeletal:         General: Swelling (bilateral lower extremities) present.   Skin:     General: Skin is warm and dry.   Neurological:      Mental Status: He is alert.   Psychiatric:         Mood and Affect: Mood normal.        Respiratory:     Respiration: (!) 31, Pulse Oximetry: 95 %    Chest Tube Drains:      HemoDynamics:  Pulse: 96 Blood Pressure: 104/63  "     Neuro:      Fluids:       Intake/Output Summary (Last 24 hours) at 2022 1010  Last data filed at 2022 0600  Gross per 24 hour   Intake 1907.77 ml   Output 2250 ml   Net -342.23 ml         Body mass index is 41.59 kg/m².    Recent Labs     22  0502 22  1230 22  0029 22  0605 22  0934   SODIUM 126*   < > 131* 133* 134*   POTASSIUM 3.6   < > 3.0* 3.0* 3.2*   CHLORIDE 96   < > 102 104 105   CO2 14*   < > 16* 16* 17*   BUN 71*   < > 72* 70* 68*   CREATININE 3.04*   < > 2.37* 2.04* 1.96*   MAGNESIUM 1.8  --   --  2.3  --    PHOSPHORUS  --   --   --  2.9  --    CALCIUM 8.6   < > 8.7 8.6 8.8    < > = values in this interval not displayed.       GI/Nutrition:  Recent Labs     22  03222  0414 22  0840 22  0502 22  1230 22  0029 22  0605 22  0934   ALTSGPT  --  35  --  56*  --   --  59*  --    ASTSGOT  --  48*  --  97*  --   --  87*  --    ALKPHOSPHAT  --  108*  --  114*  --   --  147*  --    TBILIRUBIN  --  3.5*  --  3.3*  --   --  2.5*  --    LIPASE 20  --   --   --   --   --   --   --    GLUCOSE  --  116*   < > 186*   < > 193* 191* 176*    < > = values in this interval not displayed.       Heme:  Recent Labs     22  05022  06   RBC 4.07* 3.97* 3.85*   HEMOGLOBIN 12.2* 11.9* 11.5*   HEMATOCRIT 36.3* 35.3* 33.6*   PLATELETCT 99* 101* 121*       Infectious Disease:  Monitored Temp 2  Av.6 °C (97.8 °F)  Min: 36.3 °C (97.3 °F)  Max: 36.8 °C (98.2 °F)  Recent Labs     22  0326 22  0414 22  0502 22  0605   WBC 11.4*  --  14.6* 15.1*   NEUTSPOLYS 88.90*  --   --  98.20*   LYMPHOCYTES 2.60*  --   --  0.90*   MONOCYTES 4.30  --   --  0.90   EOSINOPHILS 0.00  --   --  0.00   BASOPHILS 0.80  --   --  0.00   ASTSGOT  --  48* 97* 87*   ALTSGPT  --  35 56* 59*   ALKPHOSPHAT  --  108* 114* 147*   TBILIRUBIN  --  3.5* 3.3* 2.5*       Meds:   amiodarone  400 mg      Followed by    [START ON  11/12/2022] amiodarone  400 mg      Followed by    [START ON 11/17/2022] amiodarone  200 mg      K+ Scale: Goal of 4.5  1 Each      potassium chloride in water  20 mEq      Followed by    potassium chloride  10 mEq      insulin regular  2-9 Units      And    dextrose bolus  25 g      DAPTOmycin  8 mg/kg 1,110 mg (11/06/22 1300)    acetaminophen  650 mg      norepinephrine (Levophed) infusion  0.5-30 mcg/min 1 mcg/min (11/07/22 0715)    And    vasopressin (PITRESSIN) infusion  0.03 Units/min Stopped (11/06/22 0733)    levothyroxine  150 mcg      apixaban  5 mg      traZODone  50 mg      senna-docusate  2 Tablet      And    polyethylene glycol/lytes  1 Packet      And    magnesium hydroxide  30 mL      And    bisacodyl  10 mg      hydrocortisone sodium succinate PF  50 mg      nystatin              Assessment and Plan:    * Severe sepsis with septic shock (HCC)  Assessment & Plan  This is Septic shock Present on admission  SIRS criteria identified on my evaluation include: Tachycardia, with heart rate greater than 90 BPM and Tachypnea, with respirations greater than 20 per minute  Indicators of septic shock include: Severe sepsis present, persistent hypotension after 30 ml/kg completed, and initial lactate level result is >= 4 mmol/L.   Sources is: urine, possible abdominal  Sepsis protocol initiated  Crystalloid Fluid Administration: Fluid resuscitation ordered per standard protocol - 30 mL/kg per current or ideal body weight  IV antibiotics as appropriate for source of sepsis  Reassessment: I have reassessed the patient's hemodynamic status    - Follow urine and blood cultures, positive for possible strep bacteremia  - Unremarkable abdominal ct, cxr. Possible left heel wound vs left elbow as infectious source, urine slightly positive for infection  - ID consulted, appreciate recommendations  Continue cefepime and Flagyl (started 11/5)  Continue daptomycin, for enterococcus coverage (started 11/6)  Repeat blood  cultures, per ID  - Previous urine cultures growing Klebsiella and Enterococcus pansensitive  - Down-titrate Levophed, for MAP goal greater than 65  - Continue stress dose steroids for now  - Orthopedic surgery consulted for possible septic arthritis in the left elbow versus infective fluid collection, recommended CT versus MRI to evaluate deeper structures, ordered left upper extremity ultrasound to evaluate any fluid collection  - Wound care    Acute renal failure (HCC)  Assessment & Plan  Creatinine 2.37, improving (baseline 1.07)  - Likely prerenal vs ischemic ATN  - Hold fluids for now given low EF and elevated BNP  - Trend renal function and urine output  -Renally dose medications, avoid nephrotoxins    Hyponatremia  Assessment & Plan  Calculated serum osmolality 75, hypotonic likely in the setting of hypovolemia  - Patient received septic bolus  -Caution fluids given heart failure restriction fraction on repeat echo with LVEF 30%  - Ensure not to overcorrect sodium, 6 to 8 mEq over 24 hours  - BMP every 8 hours    Hypokalemia  Assessment & Plan  Monitor and replace as needed, goal >4  - Caution in acute renal failure    Nonischemic cardiomyopathy (HCC)  Assessment & Plan  History of nonischemic cardiomyopathy, unclear etiology however previously reduced ejection fraction-resolved  - Most recent echo showing LVEF 25-30%, likely in setting of sepsis    Thrombocytopenia (HCC)  Assessment & Plan  Likely reactive secondary to sepsis  - No active signs of bleeding    Permanent atrial fibrillation (HCC)  Assessment & Plan  Continue home eliquis and amiodarone    Hypothyroidism  Assessment & Plan  Continue home synthroid    Obesity due to excess calories with serious comorbidity  Assessment & Plan  Outpatient follow up        Quality Measures:  Feeding:  Soft/bite sized  Analgesia: None  Sedation: None  Thromboprophylaxis: Eliquis  Head of bed: >30 degrees  Ulcer prophylaxis: N/A  Glycemic control: Moderate sliding  scale  Bowel care: Bowel regimen  Indwelling lines: pIV  Deescalation of antibiotics: Cefepime, Flagyl, daptomycin - pending cultures, per ID      CODE STATUS:   Full  DISPO:    Pending possible transfer to medical floor

## 2022-11-07 NOTE — WOUND TEAM
[facility]  Inpatient Services  Initial Wound and Skin Care Evaluation    Admission Date: 11/5/2022     Last order of IP CONSULT TO WOUND CARE was found on 11/5/2022 from Hospital Encounter on 11/5/2022     HPI, PMH, SH: Reviewed    Past Surgical History:   Procedure Laterality Date    AK EXPLORATORY OF ABDOMEN  5/27/2021    Procedure: LAPAROTOMY, EXPLORATORY;  Surgeon: Kin Desouza D.O.;  Location: [facility];  Service: General    COLOSTOMY TAKEDOWN  5/27/2021    Procedure: COLOSTOMY TAKEDOWN AND CLOSURE;  Surgeon: Kin Desouza D.O.;  Location: [facility];  Service: General    COLECTOMY N/A 11/10/2020    Procedure: COLECTOMY-SEGMENTAL, COLOSTOMY, MOBILIZATION OF SPLENIC FLEXURE, WOUND VAC PLACEMENT, IRRIGATION AND DEBRIDMENT FLANK WOUND;  Surgeon: Kin Desouza D.O.;  Location: [facility];  Service: General    ZZZ CARDIAC CATH  07/21/2018    EF 45%, normal coronaries.    IRRIGATION & DEBRIDEMENT ORTHO Right 2/19/2018    Procedure: IRRIGATION & DEBRIDEMENT ORTHO-FOOT;  Surgeon: Kirby Lopez M.D.;  Location: [facility];  Service: Orthopedics    TOE AMPUTATION Right 1/17/2018    Procedure: TOE AMPUTATION-1ST RAY;  Surgeon: Doug Delong M.D.;  Location: [facility];  Service: Orthopedics    TOE AMPUTATION Right 11/10/2017    Procedure: TOE AMPUTATION;  Surgeon: Osorio Leo M.D.;  Location: [facility];  Service: Orthopedics     Social History     Tobacco Use    Smoking status: Never    Smokeless tobacco: Never   Substance Use Topics    Alcohol use: No     Chief Complaint   Patient presents with    Weakness     The pt is coming from [facility]. Staff and pt report weakness with tremors x2 days. No fever, bp 88/60. 500ml LR was given. The pt afib on the monitor at 115. Breathsounds bilaterally equal. Pt reports one episode of diarrhea. Hx of a-fib, CHF, Diabetes. 22g left forearm     Diagnosis: Septic shock (HCC)  [A41.9, R65.21]    Unit where seen by Wound Team: T611/00     WOUND CONSULT/FOLLOW UP RELATED TO:  Bilateral heels, sacrum and BMS placement     WOUND HISTORY:  Pt is a 57yr old male admitted with sepsis from [facility]. Pt is well known to inpatient wound team related to previous abdominal vac and ostomy which was since reversed. Pt has been living at [facility] and states he fell a week ago causing pain to the left elbow, elbow is edematous and erythemic and painful to touch. Pt was also noted to have pressure injuries to the sacrum and left heel.     WOUND ASSESSMENT/LDA  Wound 11/05/22 Pressure Injury Heel Left stage 3 POA (Active)   Wound Image   11/06/22 1400   Site Assessment Pink;Red    Periwound Assessment Pink;Red    Margins Attached edges    Closure None    Drainage Amount Small    Drainage Description Serosanguineous    Treatments Cleansed;Offloading    Wound Cleansing Approved Wound Cleanser    Periwound Protectant Skin Protectant Wipes to Periwound    Dressing Cleansing/Solutions Not Applicable    Dressing Options Mepilex;Hydrofera Blue Ready    Dressing Changed New    Dressing Status Clean;Dry;Intact    Dressing Change/Treatment Frequency Every 72 hrs, and As Needed    NEXT Dressing Change/Treatment Date 11/09/22    NEXT Weekly Photo (Inpatient Only) 11/13/22    WOUND NURSE ONLY - Pressure Injury Stage 3    Wound Length (cm) 2 cm    Wound Width (cm) 1.2 cm    Wound Depth (cm) 0.1 cm    Wound Surface Area (cm^2) 2.4 cm^2    Wound Volume (cm^3) 0.24 cm^3    WOUND NURSE ONLY - Time Spent with Patient (mins) 60    Number of days: 1       Wound 11/05/22 Pressure Injury Sacrum;Coccyx Left bilateral coccyx sDTI POA (Active)   Wound Image    11/06/22 1400   Site Assessment Purple;Red    Periwound Assessment Intact;Pink    Margins Attached edges    Closure None    Drainage Amount None    Treatments Cleansed;Offloading    Wound Cleansing Foam Cleanser/Washcloth    Periwound Protectant Barrier Paste    Dressing  Cleansing/Solutions Not Applicable    Dressing Options Open to Air    Dressing Change/Treatment Frequency As Needed    NEXT Weekly Photo (Inpatient Only) 11/13/22    WOUND NURSE ONLY - Pressure Injury Stage DTPI    Wound Length (cm) 2.5 cm    Wound Width (cm) 3.7 cm    Wound Surface Area (cm^2) 9.25 cm^2    Number of days: 1      Vascular:    SHAYAN:   No results found.    Lab Values:    Lab Results   Component Value Date/Time    WBC 14.6 (H) 11/06/2022 05:02 AM    RBC 3.97 (L) 11/06/2022 05:02 AM    HEMOGLOBIN 11.9 (L) 11/06/2022 05:02 AM    HEMATOCRIT 35.3 (L) 11/06/2022 05:02 AM    CREACTPROT 34.89 (H) 11/06/2022 05:02 AM    SEDRATEWES 14 11/06/2022 05:02 AM    HBA1C 5.2 02/14/2021 01:30 AM      Culture Results show:  No results found for this or any previous visit (from the past 720 hour(s)).    Pain Level/Medicated:  Denies pain       INTERVENTIONS BY WOUND TEAM:  Chart and images reviewed. Discussed with bedside RN. All areas of concern (based on picture review, LDA review and discussion with bedside RN) have been thoroughly assessed. Documentation of areas based on significant findings. This RN in to assess patient. Performed standard wound care which includes appropriate positioning, dressing removal and non-selective debridement. Pictures and measurements obtained weekly if/when required.       Left Heel  Preparation for Dressing removal: NA  Cleansed with:  NS and gauze.  Sharp debridement: NA  Joya wound: Cleansed with NS and gauze, Prepped with no sting  Primary Dressing: hydrofera blue  Secondary (Outer) Dressing: heel mepilex         Sacrum  Preparation for Dressing removal: NA   Non-selectively Debrided with:  moist warm washcloth  Sharp debridement: NA  Joya wound: Cleansed with Moist warm washcloth, Prepped with barrier paste  Primary Dressing: barrier paste  Secondary (Outer) Dressing: offloading with pillows    Pt has scattered purple linear areas of unknown etiology to bilateral medial thighs and  groin area. Do not appear to be pressure related as pt reports he did not have a frye in place PTA and no other pressure noted to be overlying the area. Pt states they are not painful.    Pt seen for placement of BMS. MD order verified. Pt assessed, noted to be incontinent of loose brown stool. Sacrum/buttocks pink and intact. Sphincter tone determined to be adequate. BMS placed without difficulty, 45 mL of tap water placed in retention balloon, irrigated with 60 mL warm tap water. Confirmed irrigant/stool output in tube. Nursing to irrigate q shift with 60 mL-120 mL warm tap water or until patent.       Interdisciplinary consultation: Patient, Bedside RN, Wound RN (Therese)    EVALUATION / RATIONALE FOR TREATMENT:  Most Recent Date:  10/6/22: Pt with stage 3 POA Pressure injury to left heel. Hydrofera Blue applied for the hydrophilic polyurethane foam which contains ethylene oxide used as a bactericidal, fungicidal, and sporicidal disinfectant. Hydrofera Blue also aids in maintaining a moist wound environment. The absorption properties of this dressing are important in collecting exudates and bacteria from the injured area. These harmful fluid secretions bind to the dressing removing it from the wound without the foam sticking to the wound causing more harm. Sacrum with POA sDTI. Barrier paste and offloading measures implemented. Frye and BMS in use to prevent further moisture related breakdown.    Goals: Steady decrease in wound area and depth weekly.    WOUND TEAM PLAN OF CARE ([X] for frequency of wound follow up,):   Nursing to follow dressing orders written for wound care. Contact wound team if area fails to progress, deteriorates or with any questions/concerns if something comes up before next scheduled follow up (See below as to whether wound is following and frequency of wound follow up)  Dressing changes by wound team:                   Follow up 3 times weekly:                NPWT change 3 times weekly:      Follow up 1-2 times weekly:      Follow up Bi-Monthly:           Follow up Monthly (High Risk):                        Follow up as needed:   X  Other (explain):     NURSING PLAN OF CARE ORDERS (X):  Dressing changes: See Dressing Care orders: X  Skin care: See Skin Care orders:   RN Prevention Protocol:   Rectal tube care: See Rectal Tube Care orders:   Other orders:    RSKIN:   CURRENTLY IN PLACE (X), APPLIED THIS VISIT (A), ORDERED (O):   Q shift Michael:  X  Q shift pressure point assessments:  X    Surface/Positioning   Pressure redistribution mattress            Low Airloss          ICU Low Airloss X  Bariatric DARYL     Waffle cushion        Waffle Overlay       X   Reposition q 2 hours   X   TAPs Turning system     Z Teja Pillow     Offloading/Redistribution   Sacral Mepilex (Silicone dressing)     Heel Mepilex (Silicone dressing)     A    Heel float boots (Prevalon boot)             Float Heels off Bed with Pillows           Respiratory   Silicone O2 tubing       X  Gray Foam Ear protectors   X  Cannula fixation Device (Tender )          High flow offloading Clip    Elastic head band offloading device      Anchorfast                                                         Trach with Optifoam split foam             Containment/Moisture Prevention     Rectal tube or BMS  A  Purwick/Condom Cath        Zabala Catheter  X  Barrier wipes           Barrier paste     X  Antifungal tx      Interdry    A    Mobilization VON      Up to chair        Ambulate      PT/OT      Nutrition       Dietician        Diabetes Education      PO   X  TF     TPN     NPO   # days     Other        Anticipated discharge plans:   LTACH:        SNF/Rehab:   X               Home Health Care:           Outpatient Wound Center:            Self/Family Care:        Other:                  Vac Discharge Needs:   Not Applicable Pt not on a wound vac:     X  Regular Vac while inpatient, alternative dressing at DC:        Regular Vac in use and  continued at DC:            Reg. Vac w/ Skin Sub/Biologic in use. Will need to be changed 2x wkly:      Veraflo Vac while inpatient, ok to transition to Regular Vac on Discharge:           Veraflo Vac while inpatient, will need to remain on Veraflo Vac upon discharge:

## 2022-11-07 NOTE — CARE PLAN
The patient is Watcher - Medium risk of patient condition declining or worsening    Shift Goals  Clinical Goals: hemodynamic stability, wean pressors  Patient Goals: get better, sleep, comfort  Family Goals: VON    Progress made toward(s) clinical / shift goals:    Problem: Skin Integrity  Goal: Skin integrity is maintained or improved  Outcome: Progressing  Note: Q2hr hour turns preformed, frequent cleansing of skin, skin protectant used. Frequent monitoring of skin integrity.            Problem: Pain - Standard  Goal: Alleviation of pain or a reduction in pain to the patient’s comfort goal  Outcome: Progressing       Patient is not progressing towards the following goals:      Problem: Hemodynamics  Goal: Patient's hemodynamics, fluid balance and neurologic status will be stable or improve  Outcome: Not Progressing  Note: Had to increase pressor during the night.

## 2022-11-07 NOTE — DIETARY
NUTRITION SERVICES: BMI - Pt with BMI >40 (=Body mass index is 41.59 kg/m².), Class III obesity. Weight loss counseling not appropriate in acute care setting. RECOMMEND - If appropriate at DC please refer to outpatient nutrition services for weight management.

## 2022-11-08 PROBLEM — M25.422 SWELLING OF LEFT ELBOW JOINT: Status: ACTIVE | Noted: 2022-11-08

## 2022-11-08 PROBLEM — I50.9 ACUTE ON CHRONIC HEART FAILURE (HCC): Status: ACTIVE | Noted: 2022-11-08

## 2022-11-08 PROBLEM — B95.5 STREPTOCOCCAL BACTEREMIA: Status: ACTIVE | Noted: 2022-11-08

## 2022-11-08 PROBLEM — R78.81 STREPTOCOCCAL BACTEREMIA: Status: ACTIVE | Noted: 2022-11-08

## 2022-11-08 LAB
ALBUMIN SERPL BCP-MCNC: 2.4 G/DL (ref 3.2–4.9)
ALBUMIN/GLOB SERPL: 0.6 G/DL
ALP SERPL-CCNC: 152 U/L (ref 30–99)
ALT SERPL-CCNC: 82 U/L (ref 2–50)
ANION GAP SERPL CALC-SCNC: 10 MMOL/L (ref 7–16)
ANION GAP SERPL CALC-SCNC: 12 MMOL/L (ref 7–16)
AST SERPL-CCNC: 127 U/L (ref 12–45)
BACTERIA STL CULT: NORMAL
BASOPHILS # BLD AUTO: 0.2 % (ref 0–1.8)
BASOPHILS # BLD: 0.03 K/UL (ref 0–0.12)
BILIRUB SERPL-MCNC: 3 MG/DL (ref 0.1–1.5)
BUN SERPL-MCNC: 63 MG/DL (ref 8–22)
BUN SERPL-MCNC: 65 MG/DL (ref 8–22)
C JEJUNI+C COLI AG STL QL: NORMAL
CALCIUM SERPL-MCNC: 8.7 MG/DL (ref 8.5–10.5)
CALCIUM SERPL-MCNC: 8.9 MG/DL (ref 8.5–10.5)
CHLORIDE SERPL-SCNC: 104 MMOL/L (ref 96–112)
CHLORIDE SERPL-SCNC: 107 MMOL/L (ref 96–112)
CO2 SERPL-SCNC: 17 MMOL/L (ref 20–33)
CO2 SERPL-SCNC: 18 MMOL/L (ref 20–33)
CREAT SERPL-MCNC: 1.44 MG/DL (ref 0.5–1.4)
CREAT SERPL-MCNC: 1.59 MG/DL (ref 0.5–1.4)
E COLI SXT1+2 STL IA: NORMAL
EOSINOPHIL # BLD AUTO: 0 K/UL (ref 0–0.51)
EOSINOPHIL NFR BLD: 0 % (ref 0–6.9)
ERYTHROCYTE [DISTWIDTH] IN BLOOD BY AUTOMATED COUNT: 46.5 FL (ref 35.9–50)
EST. AVERAGE GLUCOSE BLD GHB EST-MCNC: 126 MG/DL
GFR SERPLBLD CREATININE-BSD FMLA CKD-EPI: 49 ML/MIN/1.73 M 2
GFR SERPLBLD CREATININE-BSD FMLA CKD-EPI: 56 ML/MIN/1.73 M 2
GLOBULIN SER CALC-MCNC: 3.7 G/DL (ref 1.9–3.5)
GLUCOSE BLD STRIP.AUTO-MCNC: 173 MG/DL (ref 65–99)
GLUCOSE BLD STRIP.AUTO-MCNC: 179 MG/DL (ref 65–99)
GLUCOSE BLD STRIP.AUTO-MCNC: 192 MG/DL (ref 65–99)
GLUCOSE BLD STRIP.AUTO-MCNC: 192 MG/DL (ref 65–99)
GLUCOSE SERPL-MCNC: 191 MG/DL (ref 65–99)
GLUCOSE SERPL-MCNC: 201 MG/DL (ref 65–99)
HBA1C MFR BLD: 6 % (ref 4–5.6)
HCT VFR BLD AUTO: 33.1 % (ref 42–52)
HGB BLD-MCNC: 11.3 G/DL (ref 14–18)
IMM GRANULOCYTES # BLD AUTO: 0.27 K/UL (ref 0–0.11)
IMM GRANULOCYTES NFR BLD AUTO: 2.1 % (ref 0–0.9)
LYMPHOCYTES # BLD AUTO: 0.75 K/UL (ref 1–4.8)
LYMPHOCYTES NFR BLD: 5.8 % (ref 22–41)
MAGNESIUM SERPL-MCNC: 2.1 MG/DL (ref 1.5–2.5)
MCH RBC QN AUTO: 29.4 PG (ref 27–33)
MCHC RBC AUTO-ENTMCNC: 34.1 G/DL (ref 33.7–35.3)
MCV RBC AUTO: 86.2 FL (ref 81.4–97.8)
MONOCYTES # BLD AUTO: 0.49 K/UL (ref 0–0.85)
MONOCYTES NFR BLD AUTO: 3.8 % (ref 0–13.4)
NEUTROPHILS # BLD AUTO: 11.49 K/UL (ref 1.82–7.42)
NEUTROPHILS NFR BLD: 88.1 % (ref 44–72)
NRBC # BLD AUTO: 0 K/UL
NRBC BLD-RTO: 0 /100 WBC
PHOSPHATE SERPL-MCNC: 1.9 MG/DL (ref 2.5–4.5)
PLATELET # BLD AUTO: 137 K/UL (ref 164–446)
PMV BLD AUTO: 12.2 FL (ref 9–12.9)
POTASSIUM SERPL-SCNC: 3.4 MMOL/L (ref 3.6–5.5)
POTASSIUM SERPL-SCNC: 3.5 MMOL/L (ref 3.6–5.5)
PROT SERPL-MCNC: 6.1 G/DL (ref 6–8.2)
RBC # BLD AUTO: 3.84 M/UL (ref 4.7–6.1)
SIGNIFICANT IND 70042: NORMAL
SITE SITE: NORMAL
SODIUM SERPL-SCNC: 133 MMOL/L (ref 135–145)
SODIUM SERPL-SCNC: 135 MMOL/L (ref 135–145)
SOURCE SOURCE: NORMAL
WBC # BLD AUTO: 13 K/UL (ref 4.8–10.8)

## 2022-11-08 PROCEDURE — 83735 ASSAY OF MAGNESIUM: CPT

## 2022-11-08 PROCEDURE — 97530 THERAPEUTIC ACTIVITIES: CPT

## 2022-11-08 PROCEDURE — 700101 HCHG RX REV CODE 250: Performed by: INTERNAL MEDICINE

## 2022-11-08 PROCEDURE — 700102 HCHG RX REV CODE 250 W/ 637 OVERRIDE(OP): Performed by: INTERNAL MEDICINE

## 2022-11-08 PROCEDURE — A9270 NON-COVERED ITEM OR SERVICE: HCPCS | Performed by: INTERNAL MEDICINE

## 2022-11-08 PROCEDURE — 82962 GLUCOSE BLOOD TEST: CPT

## 2022-11-08 PROCEDURE — 700111 HCHG RX REV CODE 636 W/ 250 OVERRIDE (IP): Performed by: INTERNAL MEDICINE

## 2022-11-08 PROCEDURE — 85025 COMPLETE CBC W/AUTO DIFF WBC: CPT

## 2022-11-08 PROCEDURE — 83036 HEMOGLOBIN GLYCOSYLATED A1C: CPT

## 2022-11-08 PROCEDURE — 99232 SBSQ HOSP IP/OBS MODERATE 35: CPT | Performed by: INTERNAL MEDICINE

## 2022-11-08 PROCEDURE — A9270 NON-COVERED ITEM OR SERVICE: HCPCS | Performed by: STUDENT IN AN ORGANIZED HEALTH CARE EDUCATION/TRAINING PROGRAM

## 2022-11-08 PROCEDURE — 99222 1ST HOSP IP/OBS MODERATE 55: CPT | Mod: GC | Performed by: INTERNAL MEDICINE

## 2022-11-08 PROCEDURE — 97166 OT EVAL MOD COMPLEX 45 MIN: CPT

## 2022-11-08 PROCEDURE — 770020 HCHG ROOM/CARE - TELE (206)

## 2022-11-08 PROCEDURE — 80053 COMPREHEN METABOLIC PANEL: CPT

## 2022-11-08 PROCEDURE — 700105 HCHG RX REV CODE 258: Performed by: INTERNAL MEDICINE

## 2022-11-08 PROCEDURE — 700102 HCHG RX REV CODE 250 W/ 637 OVERRIDE(OP): Performed by: STUDENT IN AN ORGANIZED HEALTH CARE EDUCATION/TRAINING PROGRAM

## 2022-11-08 PROCEDURE — 99233 SBSQ HOSP IP/OBS HIGH 50: CPT | Performed by: INTERNAL MEDICINE

## 2022-11-08 PROCEDURE — 84100 ASSAY OF PHOSPHORUS: CPT

## 2022-11-08 PROCEDURE — 700111 HCHG RX REV CODE 636 W/ 250 OVERRIDE (IP): Performed by: STUDENT IN AN ORGANIZED HEALTH CARE EDUCATION/TRAINING PROGRAM

## 2022-11-08 RX ORDER — POTASSIUM CHLORIDE 14.9 MG/ML
20 INJECTION INTRAVENOUS ONCE
Status: COMPLETED | OUTPATIENT
Start: 2022-11-08 | End: 2022-11-08

## 2022-11-08 RX ORDER — POTASSIUM CHLORIDE 20 MEQ/1
20 TABLET, EXTENDED RELEASE ORAL 3 TIMES DAILY
Status: DISCONTINUED | OUTPATIENT
Start: 2022-11-08 | End: 2022-11-10

## 2022-11-08 RX ORDER — HYDROCODONE BITARTRATE AND ACETAMINOPHEN 5; 325 MG/1; MG/1
1 TABLET ORAL EVERY 6 HOURS PRN
Status: DISCONTINUED | OUTPATIENT
Start: 2022-11-08 | End: 2022-11-08

## 2022-11-08 RX ORDER — HYDROCODONE BITARTRATE AND ACETAMINOPHEN 5; 325 MG/1; MG/1
2 TABLET ORAL EVERY 6 HOURS PRN
Status: DISCONTINUED | OUTPATIENT
Start: 2022-11-08 | End: 2022-11-17 | Stop reason: HOSPADM

## 2022-11-08 RX ADMIN — NYSTATIN: 100000 POWDER TOPICAL at 17:45

## 2022-11-08 RX ADMIN — CEFAZOLIN 2 G: 2 INJECTION, POWDER, FOR SOLUTION INTRAMUSCULAR; INTRAVENOUS at 21:03

## 2022-11-08 RX ADMIN — APIXABAN 5 MG: 5 TABLET, FILM COATED ORAL at 05:26

## 2022-11-08 RX ADMIN — INSULIN HUMAN 2 UNITS: 100 INJECTION, SOLUTION PARENTERAL at 06:38

## 2022-11-08 RX ADMIN — HYDROCORTISONE SODIUM SUCCINATE 50 MG: 100 INJECTION, POWDER, FOR SOLUTION INTRAMUSCULAR; INTRAVENOUS at 17:53

## 2022-11-08 RX ADMIN — INSULIN HUMAN 2 UNITS: 100 INJECTION, SOLUTION PARENTERAL at 13:13

## 2022-11-08 RX ADMIN — LEVOTHYROXINE SODIUM 150 MCG: 0.07 TABLET ORAL at 05:26

## 2022-11-08 RX ADMIN — SODIUM PHOSPHATE, MONOBASIC, MONOHYDRATE AND SODIUM PHOSPHATE, DIBASIC, ANHYDROUS 30 MMOL: 276; 142 INJECTION, SOLUTION INTRAVENOUS at 11:29

## 2022-11-08 RX ADMIN — ACETAMINOPHEN 650 MG: 325 TABLET, FILM COATED ORAL at 11:24

## 2022-11-08 RX ADMIN — POTASSIUM CHLORIDE 20 MEQ: 1500 TABLET, EXTENDED RELEASE ORAL at 08:28

## 2022-11-08 RX ADMIN — POTASSIUM CHLORIDE 20 MEQ: 1500 TABLET, EXTENDED RELEASE ORAL at 17:54

## 2022-11-08 RX ADMIN — POTASSIUM CHLORIDE 20 MEQ: 1500 TABLET, EXTENDED RELEASE ORAL at 11:24

## 2022-11-08 RX ADMIN — INSULIN HUMAN 2 UNITS: 100 INJECTION, SOLUTION PARENTERAL at 17:44

## 2022-11-08 RX ADMIN — CEFAZOLIN 2 G: 2 INJECTION, POWDER, FOR SOLUTION INTRAMUSCULAR; INTRAVENOUS at 13:15

## 2022-11-08 RX ADMIN — AMIODARONE HYDROCHLORIDE 400 MG: 200 TABLET ORAL at 17:53

## 2022-11-08 RX ADMIN — POTASSIUM CHLORIDE 20 MEQ: 14.9 INJECTION, SOLUTION INTRAVENOUS at 08:28

## 2022-11-08 RX ADMIN — HYDROCODONE BITARTRATE AND ACETAMINOPHEN 1 TABLET: 5; 325 TABLET ORAL at 14:22

## 2022-11-08 RX ADMIN — POTASSIUM CHLORIDE 20 MEQ: 14.9 INJECTION, SOLUTION INTRAVENOUS at 01:00

## 2022-11-08 RX ADMIN — AMIODARONE HYDROCHLORIDE 400 MG: 200 TABLET ORAL at 05:25

## 2022-11-08 RX ADMIN — INSULIN HUMAN 2 UNITS: 100 INJECTION, SOLUTION PARENTERAL at 21:06

## 2022-11-08 RX ADMIN — TRAZODONE HYDROCHLORIDE 50 MG: 50 TABLET ORAL at 20:58

## 2022-11-08 RX ADMIN — NYSTATIN: 100000 POWDER TOPICAL at 05:32

## 2022-11-08 RX ADMIN — CEFAZOLIN 2 G: 2 INJECTION, POWDER, FOR SOLUTION INTRAMUSCULAR; INTRAVENOUS at 05:28

## 2022-11-08 RX ADMIN — APIXABAN 5 MG: 5 TABLET, FILM COATED ORAL at 17:54

## 2022-11-08 RX ADMIN — HYDROCORTISONE SODIUM SUCCINATE 50 MG: 100 INJECTION, POWDER, FOR SOLUTION INTRAMUSCULAR; INTRAVENOUS at 01:02

## 2022-11-08 RX ADMIN — HYDROCORTISONE SODIUM SUCCINATE 50 MG: 100 INJECTION, POWDER, FOR SOLUTION INTRAMUSCULAR; INTRAVENOUS at 05:29

## 2022-11-08 ASSESSMENT — ENCOUNTER SYMPTOMS
NAUSEA: 1
DOUBLE VISION: 0
WEAKNESS: 0
WEIGHT LOSS: 0
ORTHOPNEA: 0
NAUSEA: 0
BLURRED VISION: 0
PHOTOPHOBIA: 0
SPUTUM PRODUCTION: 0
HEADACHES: 0
WHEEZING: 0
FOCAL WEAKNESS: 0
STRIDOR: 0
CONSTIPATION: 0
EYE PAIN: 0
BACK PAIN: 0
EYE REDNESS: 0
BRUISES/BLEEDS EASILY: 0
TINGLING: 0
FEVER: 0
SHORTNESS OF BREATH: 0
PALPITATIONS: 0
SEIZURES: 0
VOMITING: 0
SORE THROAT: 0
HALLUCINATIONS: 0
MYALGIAS: 0
NERVOUS/ANXIOUS: 0
NECK PAIN: 0
CHILLS: 0
SENSORY CHANGE: 0
DIZZINESS: 0
TREMORS: 0
COUGH: 0
ABDOMINAL PAIN: 0
SPEECH CHANGE: 0
HEMOPTYSIS: 0
EYE DISCHARGE: 0
DIARRHEA: 0

## 2022-11-08 ASSESSMENT — COGNITIVE AND FUNCTIONAL STATUS - GENERAL
DAILY ACTIVITIY SCORE: 15
DRESSING REGULAR UPPER BODY CLOTHING: A LITTLE
MOBILITY SCORE: 9
STANDING UP FROM CHAIR USING ARMS: A LOT
MOVING FROM LYING ON BACK TO SITTING ON SIDE OF FLAT BED: UNABLE
DRESSING REGULAR LOWER BODY CLOTHING: A LOT
PERSONAL GROOMING: A LITTLE
SUGGESTED CMS G CODE MODIFIER DAILY ACTIVITY: CK
HELP NEEDED FOR BATHING: A LOT
WALKING IN HOSPITAL ROOM: A LOT
SUGGESTED CMS G CODE MODIFIER MOBILITY: CM
CLIMB 3 TO 5 STEPS WITH RAILING: TOTAL
TURNING FROM BACK TO SIDE WHILE IN FLAT BAD: A LOT
TOILETING: A LOT
MOVING TO AND FROM BED TO CHAIR: UNABLE
EATING MEALS: A LITTLE

## 2022-11-08 ASSESSMENT — LIFESTYLE VARIABLES
HAVE YOU EVER FELT YOU SHOULD CUT DOWN ON YOUR DRINKING: NO
CONSUMPTION TOTAL: NEGATIVE
HAVE PEOPLE ANNOYED YOU BY CRITICIZING YOUR DRINKING: NO
SUBSTANCE_ABUSE: 0
TOTAL SCORE: 0
AVERAGE NUMBER OF DAYS PER WEEK YOU HAVE A DRINK CONTAINING ALCOHOL: 0
ON A TYPICAL DAY WHEN YOU DRINK ALCOHOL HOW MANY DRINKS DO YOU HAVE: 0
EVER FELT BAD OR GUILTY ABOUT YOUR DRINKING: NO
HOW MANY TIMES IN THE PAST YEAR HAVE YOU HAD 5 OR MORE DRINKS IN A DAY: 0
EVER HAD A DRINK FIRST THING IN THE MORNING TO STEADY YOUR NERVES TO GET RID OF A HANGOVER: NO
ALCOHOL_USE: NO

## 2022-11-08 ASSESSMENT — PAIN DESCRIPTION - PAIN TYPE
TYPE: ACUTE PAIN

## 2022-11-08 ASSESSMENT — FIBROSIS 4 INDEX: FIB4 SCORE: 6.04

## 2022-11-08 ASSESSMENT — GAIT ASSESSMENTS: GAIT LEVEL OF ASSIST: UNABLE TO PARTICIPATE

## 2022-11-08 ASSESSMENT — ACTIVITIES OF DAILY LIVING (ADL): TOILETING: INDEPENDENT

## 2022-11-08 NOTE — PROGRESS NOTES
Superficial infectious Disease Progress Note    Author: Rai Rodriguez M.D. Date & Time of service: 2022  9:29 AM    Chief Complaint:  Follow-up for bacteremia    Interval History:   T-max 102.6 on , white count up to 15.1 today, on cefepime and Flagyl.  Continues to have left elbow pain   patient afebrile since antibiotic initiation, white count of 11,000, tolerated switch to Ancef.    Labs Reviewed and Medications Reviewed.    Review of Systems:  Review of Systems   Constitutional:  Negative for chills and fever.   Gastrointestinal:  Negative for abdominal pain, diarrhea, nausea and vomiting.   Musculoskeletal:  Positive for joint pain.   Skin:  Negative for itching and rash.   All other systems reviewed and are negative.    Hemodynamics:  Temp (24hrs), Av.4 °C (97.6 °F), Min:36.2 °C (97.1 °F), Max:36.7 °C (98.1 °F)  Temperature: 36.2 °C (97.1 °F), Monitored Temp: 36.8 °C (98.2 °F)  Pulse  Av.2  Min: 82  Max: 140   Blood Pressure: 125/69       Physical Exam:  Physical Exam  Vitals and nursing note reviewed.   Constitutional:       General: He is not in acute distress.     Appearance: He is ill-appearing.      Comments: Chronically ill-appearing   HENT:      Mouth/Throat:      Comments: Poor dentition  Eyes:      General: No scleral icterus.        Right eye: No discharge.         Left eye: No discharge.      Conjunctiva/sclera: Conjunctivae normal.   Cardiovascular:      Rate and Rhythm: Normal rate and regular rhythm.      Heart sounds: No murmur heard.  Pulmonary:      Effort: Pulmonary effort is normal. No respiratory distress.      Breath sounds: No stridor.   Abdominal:      General: Abdomen is flat. There is no distension.      Tenderness: There is no abdominal tenderness.   Musculoskeletal:         General: Swelling and tenderness present.      Comments: Left posterior elbow swelling with erythema, pain with passive motion, increased warmth   Skin:     Findings: Erythema  present.   Neurological:      General: No focal deficit present.      Mental Status: He is alert and oriented to person, place, and time.   Psychiatric:      Comments: Flat affect       Meds:    Current Facility-Administered Medications:     potassium chloride in water    hydrocortisone sodium succinate PF    potassium chloride SA    sodium phosphate ivpb    amiodarone **FOLLOWED BY** [START ON 11/12/2022] amiodarone **FOLLOWED BY** [START ON 11/17/2022] amiodarone    ceFAZolin    insulin regular **AND** POC blood glucose manual result **AND** NOTIFY MD and PharmD **AND** Administer 20 grams of glucose (approximately 8 ounces of fruit juice) every 15 minutes PRN FSBG less than 70 mg/dL **AND** dextrose bolus    acetaminophen    levothyroxine    apixaban    traZODone    senna-docusate **AND** polyethylene glycol/lytes **AND** magnesium hydroxide **AND** bisacodyl    nystatin    Labs:  Recent Labs     11/06/22  0502 11/07/22  0605 11/08/22  0520   WBC 14.6* 15.1* 13.0*   RBC 3.97* 3.85* 3.84*   HEMOGLOBIN 11.9* 11.5* 11.3*   HEMATOCRIT 35.3* 33.6* 33.1*   MCV 88.9 87.3 86.2   MCH 30.0 29.9 29.4   RDW 48.3 47.5 46.5   PLATELETCT 101* 121* 137*   MPV 12.1 12.3 12.2   NEUTSPOLYS  --  98.20* 88.10*   LYMPHOCYTES  --  0.90* 5.80*   MONOCYTES  --  0.90 3.80   EOSINOPHILS  --  0.00 0.00   BASOPHILS  --  0.00 0.20   RBCMORPHOLO  --  Present  --        Recent Labs     11/06/22  1230 11/06/22  1700 11/07/22  1723 11/07/22  2340 11/08/22  0520   SODIUM 129*   < > 135 133* 135   POTASSIUM 3.0*   < > 3.2* 3.4* 3.5*   CHLORIDE 98   < > 106 104 107   CO2 15*   < > 17* 17* 18*   GLUCOSE 165*   < > 182* 201* 191*   BUN 73*   < > 65* 65* 63*   CPKTOTAL 596*  --   --   --   --     < > = values in this interval not displayed.       Recent Labs     11/06/22  0502 11/06/22  1230 11/07/22  0605 11/07/22  0934 11/07/22  1723 11/07/22  2340 11/08/22  0520   ALBUMIN 2.6*  --  2.4*  --   --   --  2.4*   TBILIRUBIN 3.3*  --  2.5*  --   --   --   3.0*   ALKPHOSPHAT 114*  --  147*  --   --   --  152*   TOTPROTEIN 6.5  --  6.2  --   --   --  6.1   ALTSGPT 56*  --  59*  --   --   --  82*   ASTSGOT 97*  --  87*  --   --   --  127*   CREATININE 3.04*   < > 2.04*   < > 1.75* 1.59* 1.44*    < > = values in this interval not displayed.         Imaging:  CT-ABDOMEN-PELVIS W/O    Result Date: 11/5/2022 11/5/2022 9:05 AM HISTORY/REASON FOR EXAM:  Abdominal distention. TECHNIQUE/EXAM DESCRIPTION: CT scan of the abdomen and pelvis without contrast. Noncontrast helical scanning was obtained from the diaphragmatic domes through the pubic symphysis. Low dose optimization technique was utilized for this CT exam including automated exposure control and adjustment of the mA and/or kV according to patient size. COMPARISON: None. FINDINGS: Lower Chest: There is bibasilar atelectasis.. Liver: Normal. Spleen: Calcified granulomas. Pancreas: Atrophic. Gallbladder: There are multiple gallstones. Biliary: Nondilated. Adrenal glands: Normal. Kidneys: Unremarkable without hydronephrosis. Bowel: The colon is mildly distended and contains fluid.. Lymph nodes: No adenopathy. Vasculature: Unremarkable. Peritoneum: Unremarkable without ascites. Musculoskeletal: There are chronic compression fractures of the spine.. Pelvis: No adenopathy or free fluid.     1.  No evidence of bowel obstruction or focal inflammatory change. 2.  Gallstones. 3.  Mildly distended colon which also contains fluid. No evidence of bowel obstruction.    DX-CHEST-PORTABLE (1 VIEW)    Result Date: 11/7/2022 11/7/2022 12:04 AM HISTORY/REASON FOR EXAM:  sepsis. TECHNIQUE/EXAM DESCRIPTION AND NUMBER OF VIEWS: Single portable view of the chest. COMPARISON:  11/5/2022. FINDINGS: LUNGS: Linear atelectasis in the right lower lung. No focal consolidation. Persistent interstitial edema. No effusions. PNEUMOTHORAX: None. LINES AND TUBES: Stable right IJ central catheter. MEDIASTINUM: Stable cardiac silhouette. MUSCULOSKELETAL  STRUCTURES: Unchanged.     1.  Stable cardiomegaly and interstitial edema. 2.  Stable linear atelectasis in the right lower lung.     DX-CHEST-PORTABLE (1 VIEW)    Result Date: 11/5/2022 11/5/2022 8:17 AM HISTORY/REASON FOR EXAM: Central line placement TECHNIQUE/EXAM DESCRIPTION AND NUMBER OF VIEWS: Single portable view of the chest. COMPARISON: 11/5/2022 FINDINGS: There is a right IJ central line present with the tip overlying the upper right atrium. No evidence of pneumothorax. There is interstitial edema. There is linear atelectasis within the right midlung. There is no pleural effusion. The heart is enlarged.     1.  Cardiomegaly. There is also interstitial edema. 2.  Right IJ central line present without evidence of complication. 3.  Linear atelectasis within the right lung base.    DX-CHEST-PORTABLE (1 VIEW)    Result Date: 11/5/2022 11/5/2022 3:40 AM HISTORY/REASON FOR EXAM:  Sepsis TECHNIQUE/EXAM DESCRIPTION AND NUMBER OF VIEWS: Single portable view of the chest. COMPARISON: 5/25/2021 FINDINGS: HEART: Not enlarged. LUNGS: Hazy opacity right lower lung. PLEURA: No effusion or pneumothorax.     1.  Hazy opacity at the right lower lung, similar to 5/25/2021 exam. Scarring versus pneumonia.    DX-ELBOW-COMPLETE 3+ LEFT    Result Date: 11/5/2022 11/5/2022 11:29 AM HISTORY/REASON FOR EXAM:  Pain/Deformity Following Trauma TECHNIQUE/EXAM DESCRIPTION AND NUMBER OF VIEWS:  3 views of the LEFT elbow. COMPARISON: None FINDINGS: There is no evidence of joint effusion. There is no evidence of displaced fracture or dislocation. There is no focal soft tissue swelling.     Normal elbow series.    US-EXTREMITY NON VASCULAR UNILATERAL LEFT    Result Date: 11/6/2022 11/6/2022 12:21 PM HISTORY/REASON FOR EXAM:  Left elbow swelling, redness, pain TECHNIQUE/EXAM DESCRIPTION AND NUMBER OF VIEWS: Ultrasound extremity nonvascular unilateral COMPARISON: X-ray yesterday FINDINGS: There is edema. No fluid collection identified.      Soft tissue edema in the area of concern about the left elbow without evidence of fluid collection/abscess.    EC-ECHOCARDIOGRAM COMPLETE W/ CONT    Result Date: 2022  Transthoracic Echo Report Echocardiography Laboratory CONCLUSIONS Severely reduced left ventricular systolic function. The left ventricular ejection fraction is visually estimated to be 25- 30%, but may be underestimated due to tachycardia. Global hypokinesis with more pronounced hypokinesis of the anterior and anterolateral walls. Diastolic function is abnormal, but grade cannot be determined. The right ventricle is not well visualized, but appears dilated with reduced systolic function. Biatrial dilation. Mild to moderate tricuspid regurgitation. Dilated inferior vena cava without inspiratory collapse. EMELY FLORES Exam Date:         2022                    11:46 Exam Location:     Inpatient Priority:          Routine Ordering Physician:        LESLIE ARMENTA Referring Physician: Sonographer:               Sheron Baxter RDCS Age:    57     Gender:    M MRN:    [id number] :    1965 BSA:    2.49   Ht (in):    72     Wt (lb):    290 Exam Type:     Complete, Contrast Indications:     Heart Failure, Systolic ICD Codes:       428.2 CPT Codes:       50823,  BP:   90     /   52     HR:   120 Technical Quality:       Fair MEASUREMENTS  (Male / Female) Normal Values 2D ECHO LV Diastolic Diameter PLAX        5.1 cm                4.2 - 5.9 / 3.9 - 5.3 cm LV Systolic Diameter PLAX         4.8 cm                2.1 - 4.0 cm IVS Diastolic Thickness           1 cm                  LVPW Diastolic Thickness          0.83 cm               LVOT Diameter                     2.2 cm                Estimated LV Ejection Fraction    30 %                  LV Ejection Fraction MOD BP       39.3 %                >= 55  % LV Ejection Fraction MOD 4C       41.2 %                LV Ejection Fraction MOD 2C       36.1 %                IVC Diameter                       2.5 cm                DOPPLER AV Peak Velocity                  1.2 m/s               AV Peak Gradient                  6 mmHg                AV Mean Gradient                  3.8 mmHg              LVOT Peak Velocity                0.85 m/s              AV Area Cont Eq vti               2.4 cm2               TV Peak E Velocity                0.51 m/s              TR Peak Velocity                  290 cm/s              * Indicates values subject to auto-interpretation LV EF:  30    % FINDINGS Left Ventricle Normal left ventricular chamber size. Normal left ventricular wall thickness. Severely reduced left ventricular systolic function. The left ventricular ejection fraction is visually estimated to be 25-30%, but may be underestimated due to tachycardia. Global hypokinesis with more pronounced hypokinesis of the anterior and anterolateral walls. Diastolic function is abnormal, but grade cannot be determined. 3 mL of contrast was used to enhance visualization of the endocardial border. Existing IV was used at the left elbow. Right Ventricle The right ventricle is not well visualized, but appears dilated with reduced systolic function. Estimated right ventricular systolic pressure is 50 mmHg Right Atrium The right atrium is not well visualized, but appears dilated. Dilated inferior vena cava without inspiratory collapse. Left Atrium The left atrium is not well visualized, but appears dilated. Mitral Valve The mitral valve is not well visualized. No stenosis or regurgitation seen. Aortic Valve The aortic valve is not well visualized. No stenosis or regurgitation seen. Tricuspid Valve No tricuspid stenosis. Mild to moderate tricuspid regurgitation. Pulmonic Valve Structurally normal pulmonic valve without significant stenosis or regurgitation. Pericardium Normal pericardium without effusion. Aorta Normal aortic root for body surface area. The ascending aorta diameter is 3.5 cm. Michael Acosta MD  "(Electronically Signed) Final Date:     05 November 2022                 14:02      Micro:  Results       Procedure Component Value Units Date/Time    BLOOD CULTURE [[id number]] Collected: 11/07/22 1102    Order Status: Completed Specimen: Blood from Peripheral Updated: 11/08/22 0731     Significant Indicator NEG     Source BLD     Site PERIPHERAL     Culture Result No Growth  Note: Blood cultures are incubated for 5 days and  are monitored continuously.Positive blood cultures  are called to the RN and reported as soon as  they are identified.      Narrative:      Per Hospital Policy: Only change Specimen Src: to \"Line\" if  specified by physician order.  Per Hospital Policy: Only change Specimen Src: to \"Line\" if    BLOOD CULTURE [[id number]] Collected: 11/07/22 1053    Order Status: Completed Specimen: Blood from Peripheral Updated: 11/08/22 0731     Significant Indicator NEG     Source BLD     Site PERIPHERAL     Culture Result No Growth  Note: Blood cultures are incubated for 5 days and  are monitored continuously.Positive blood cultures  are called to the RN and reported as soon as  they are identified.      Narrative:      Per Hospital Policy: Only change Specimen Src: to \"Line\" if  specified by physician order.  No site indicated    Blood Culture [[id number]]  (Abnormal)  (Susceptibility) Collected: 11/05/22 0414    Order Status: Completed Specimen: Blood from Peripheral Updated: 11/07/22 1114     Significant Indicator POS     Source BLD     Site PERIPHERAL     Culture Result Growth detected by Bactec instrument. 11/05/2022  15:42      Streptococcus pyogenes (Group A)  This isolate does NOT demonstrate inducible Clindamycin  resistance in vitro.      Narrative:      CALL  [place]  161 tel. [phone],  CALLED  161 tel. [phone] 11/06/2022, 12:01, RB PERF. RESULTS CALLED TO:  [id number]  1 of 2 for Blood Culture x 2 sites order. Per Hospital  Policy: Only change Specimen Src: to \"Line\" if specified by  physician " "order.  Left Hand    Susceptibility       Streptococcus pyogenes (group a) (1)       Antibiotic Interpretation Microscan   Method Status    Penicillin Sensitive 0.012 mcg/mL E-TEST Final    Cefotaxime Sensitive 0.012 mcg/mL E-TEST Final    Clindamycin Sensitive - mcg/mL E-TEST Final                       Blood Culture [[id number]]  (Abnormal) Collected: 11/05/22 0415    Order Status: Completed Specimen: Blood from Peripheral Updated: 11/07/22 1102     Significant Indicator POS     Source BLD     Site PERIPHERAL     Culture Result Growth detected by Bactec instrument. 11/05/2022  14:27      Streptococcus pyogenes (Group A)  See previous culture for sensitivity report.      Narrative:      CALL  [place]  161 tel. [phone],  CALLED  161 tel. [phone] 11/05/2022, 14:52, RB PERF. RESULTS CALLED  TO:[phone]  2 of 2 blood culture x2  Sites order. Per Hospital Policy:  Only change Specimen Src: to \"Line\" if specified by physician  order.  Right Wrist    E-Test [[id number]] Collected: 11/05/22 0414    Order Status: Completed Specimen: Other Updated: 11/07/22 1100     ETEST Sensitivity FINAL    Narrative:      161 tel. [phone] 11/06/2022, 12:01, RB PERF. RESULTS CALLED TO: [id number]  1 of 2 for Blood Culture x 2 sites order. Per Hospital  Policy: Only change Specimen Src: to \"Line\" if specified by  physician order.    Urine Culture (NEW) [[id number]] Collected: 11/05/22 0500    Order Status: Completed Specimen: Urine Updated: 11/07/22 0950     Significant Indicator NEG     Source UR     Site -     Culture Result No growth at 48 hours.    Narrative:      Indication for culture:->Evaluation for sepsis without a  clear source of infection  Indication for culture:->Evaluation for sepsis without a    EHEC(Shiga Toxin)Detection [[id number]] Collected: 11/05/22 0450    Order Status: Completed Specimen: Stool Updated: 11/06/22 1531     Significant Indicator NEG     Source STL     Site STOOL     EHEC Negative for Shiga Toxin 1 and 2.    " Narrative:      For adult patients only; culture includes screen for  Campylobacter.  For adult patients only; culture includes screen for    CULTURE STOOL [[id number]] Collected: 11/05/22 0450    Order Status: Completed Specimen: Stool Updated: 11/06/22 1529     Significant Indicator NEG     Source STL     Site STOOL     Culture Result Culture in progress.    NOTE:    Stool cultures are screened for Shiga Toxins 1 and 2,    Salmonella, Shigella, Campylobacter, Aeromonas,    Plesiomonas, and Vibrio.       EHEC Negative for Shiga Toxin 1 and 2.     Campylobactor Antigen --     Negative for Campylobacter Antigen.  Test results are to be used in conjunction with information  available from the patient clinical evaluation and other  diagnostic procedures.      Narrative:      For adult patients only; culture includes screen for  Campylobacter.  For adult patients only; culture includes screen for    MRSA By PCR (Amp) [[id number]] Collected: 11/05/22 1156    Order Status: Completed Specimen: Respirate from Nares Updated: 11/05/22 1433     MRSA by PCR POSITIVE    Narrative:      Collected By: [id number] AUGUSTIN HERBERTH O    C Diff by PCR rflx Toxin [[id number]] Collected: 11/05/22 0450    Order Status: Completed Specimen: Stool Updated: 11/05/22 0804     C Diff by PCR Negative     Comment: C. difficile NOT detected by PCR.  Treatment not indicated per guidelines.  Repeat testing not indicated within 7 days.          027-NAP1-BI Presumptive Negative     Comment: Presumptive 027/NAP1/BI target DNA sequences are NOT DETECTED.       Narrative:      Does this patient have risk factors for C-diff?->Yes  C-Diff Risk Factors->immunocompromising conditions    CoV-2, FLU A/B, and RSV by PCR (2-4 Hours [facility]) : Collect NP swab in VTM [[id number]] Collected: 11/05/22 0507    Order Status: Completed Specimen: Respirate Updated: 11/05/22 0606     Influenza virus A RNA Negative     Influenza virus B, PCR Negative     RSV, PCR Negative      "SARS-CoV-2 by PCR NotDetected     Comment: PATIENTS: Important information regarding your results and instructions can  be found at [email or web address]   \"After your  Covid-19 Test\"    [facility] providers: PLEASE REFER TO DE-ESCALATION AND RETESTING PROTOCOL  on [email or web address]    **The [employer] GeneXpert Xpress SARS-CoV-2 RT-PCR Test has been made  available for use under the Emergency Use Authorization (EUA) only.          SARS-CoV-2 Source NP Swab    Urinalysis [[id number]]  (Abnormal) Collected: 11/05/22 0500    Order Status: Completed Specimen: Urine Updated: 11/05/22 0555     Color DK Yellow     Character Turbid     Specific Gravity 1.017     Ph 5.0     Glucose 100 mg/dL      Ketones Trace mg/dL      Protein 30 mg/dL      Bilirubin Small     Urobilinogen, Urine 1.0     Nitrite Positive     Leukocyte Esterase Small     Occult Blood Negative     Micro Urine Req Microscopic    Narrative:      Indication for culture:->Evaluation for sepsis without a  clear source of infection            Assessment:  This is a 57-year-old male patient with type 2 diabetes, nonischemic cardiomyopathy, A. fib on amiodarone and Eliquis, prior hospitalization for bowel perforation, presented with 4 days of rigors, watery diarrhea, fevers has a chronic superficial left heel wound, was found to be in septic shock with acute renal failure and lactic acidosis, blood cultures positive for GPC in chains.  CT scan of the abdomen pelvis with no inflammation.  Chest x-ray with interstitial opacities.  TTE with EF of 25 to 30%, no obvious valvular vegetations were noted.    Plan:  -Blood cultures positive for Group A Strep  -Multiple allergies including Unasyn, Zosyn, patient prefers not to attempt challenge.  Continue IV Ancef 2 g every 8 hours  -Additional imaging of the left elbow pending MRI-exam is concerning for septic arthritis that may need washout  -Repeat blood cultures x2 pending    Discussed with internal " medicine, Dr. Johnson

## 2022-11-08 NOTE — CONSULTS
Hospital Medicine Consultation    Date of Service  11/8/2022    Referring Physician    Wicho Johnson M.D.    Consulting Physician  Bibi Fox M.D.    Reason for Consultation    Continue management for severe sepsis with septic shock    History of Presenting Illness  57 y.o. male with a past medical history of permanent A. fib on amiodarone and Eliquis, non-insulin-dependent diabetes mellitus type 2, nonischemic cardiomyopathy and prior hospitalization for bowel perforation status post expiratory laparotomy presents to the hospital with complaint of rigors on 11/5/2022.  On presentation patient reported that he has been having symptoms of diarrhea and rigors for the last 4 days which is associated with fevers.  He has been living at the skilled nursing facility for the past 1 year.  He reported generalized weakness and lightheadedness with visual changes on admission.  On admission he found to have septic shock and received IV fluid bolus and started him on IV antibiotics and he was placed on pressor due to hypotension with a target map of greater than 65.  Infectious disease consulted for positive blood cultures and the recommended CÉSAR if persistently positive blood cultures.  He was placed on cefepime and Flagyl.  There would be possible source of joint infection of left elbow and he underwent ultrasound did not show fluid collection and MRI ordered to evaluate for septic arthritis.  TTE did not show signs of endocarditis.  He found to have acute kidney injury it has been improving.  For his atrial fibrillation currently he is on rate control and he has been receiving Eliquis for anticoagulation.    On November 8, 2022 intensivist Dr. Johnson requested to transfer care to hospitalist service.  This morning he is hemodynamically stable and requiring 1 L of oxygen to maintain oxygen saturation.  CBC showed elevated white blood cell count of 13.0 and hemoglobin of 11.3 with platelet count of  137.  BMP showed hypokalemia with potassium of 3.5, glucose of 191, BUN of 63 and creatinine 1.44.  His acute kidney injury has been improving.  He also found to have phosphorus of 1.9.  He is receiving potassium and phosphorus replacement.    I evaluated and examined him at the bedside.  He reported that he is feeling better but he continued to have pain in his left elbow.  RN received a call from radiology that if you are looking for infected joint is better to change MRI without contrast to with contrast.  I change MRI order to with contrast.    I discussed plan of care with patient and answered all his questions.    Currently he has been receiving amiodarone, Eliquis, Ancef and hydrocortison.  I discussed plan of care with patient and bedside RN.        Review of Systems  Review of Systems   Constitutional:  Positive for malaise/fatigue. Negative for chills, fever and weight loss.   HENT:  Negative for hearing loss and tinnitus.    Eyes:  Negative for blurred vision, double vision, photophobia and pain.   Respiratory:  Negative for cough, sputum production and shortness of breath.    Cardiovascular:  Negative for chest pain, palpitations, orthopnea and leg swelling.   Gastrointestinal:  Negative for abdominal pain, constipation, diarrhea, nausea and vomiting.   Genitourinary:  Negative for dysuria, frequency and urgency.   Musculoskeletal:  Positive for joint pain. Negative for back pain, myalgias and neck pain.   Skin:  Negative for rash.   Neurological:  Negative for dizziness, tingling, tremors, sensory change, speech change, focal weakness and headaches.   Psychiatric/Behavioral:  Negative for hallucinations and substance abuse.    All other systems reviewed and are negative.    Past Medical History   has a past medical history of A-fib ([facility]), Abscess (11/8/2020), Bacteremia (11/8/2020), Chest pain, Congestive heart failure ([facility]), Diabetes ([facility]), Diabetic neuropathy ([facility]), Hypercholesterolemia, Hypertension,  Longstanding persistent atrial fibrillation (HCC) (11/8/2020), LV dysfunction (11/8/2020), Morbid obesity ([facility]), NICM (nonischemic cardiomyopathy) ([facility]) (11/8/2020), Noncompliance, Normal cardiac stress test, Orthostatic hypotension, and Sepsis ([facility]) (11/8/2020).    Surgical History   has a past surgical history that includes toe amputation (Right, 11/10/2017); toe amputation (Right, 1/17/2018); irrigation & debridement ortho (Right, 2/19/2018); zzz cardiac cath (07/21/2018); colectomy (N/A, 11/10/2020); pr exploratory of abdomen (5/27/2021); and colostomy takedown (5/27/2021).    Family History  family history includes No Known Problems in his father and mother.    Social History   reports that he has never smoked. He has never used smokeless tobacco. He reports that he does not drink alcohol and does not use drugs.    Medications  Prior to Admission Medications   Prescriptions Last Dose Informant Patient Reported? Taking?   PARoxetine (PAXIL) 30 MG Tab 11/4/2022 at 0849 MAR from Other Facility Yes Yes   Sig: Take 30 mg by mouth every day.   ROPINIRole (REQUIP) 0.25 MG Tab 11/4/2022 at 1902 MAR from Other Facility Yes Yes   Sig: Take 0.5 mg by mouth at bedtime.   acetaminophen (TYLENOL) 325 MG Tab 8/23/2022 at 0937 MAR from Other Facility Yes Yes   Sig: Take 650 mg by mouth every 6 hours as needed. Indications: Pain   amiodarone (CORDARONE) 200 MG Tab 11/4/2022 at 0849 MAR from Other Facility No No   Sig: Take 1 Tablet by mouth every day.   apixaban (ELIQUIS) 5mg Tab 11/4/2022 at 2055 MAR from Other Facility No No   Sig: Take 1 Tablet by mouth 2 times a day. Indications: Thromboembolism secondary to Atrial Fibrillation   clonazePAM (KLONOPIN) 0.5 MG Tab 11/4/2022 at 1933 MAR from Other Facility Yes No   Sig: Take  by mouth 2 times a day.   levothyroxine (SYNTHROID) 150 MCG Tab 11/4/2022 at 0512 MAR from Other Facility No No   Sig: Take 1 tablet by mouth every morning on an empty stomach.   lisinopril (PRINIVIL) 20  MG Tab 11/4/2022 at 0849 MAR from Other Facility Yes Yes   Sig: Take 20 mg by mouth every day.   magnesium oxide (MAG-OX) 400 MG Tab tablet 11/4/2022 at 2055 MAR from Other Facility No No   Sig: Take 1 tablet by mouth 2 times a day.   metoprolol SR (TOPROL XL) 25 MG TABLET SR 24 HR 11/4/2022 at 0849 MAR from Other Facility Yes Yes   Sig: Take 50 mg by mouth every day. 2 tablets = 50 mg   omeprazole (PRILOSEC) 40 MG delayed-release capsule 11/4/2022 at 0512 MAR from Other Facility No No   Sig: Take 1 capsule by mouth every 12 hours.   psyllium (METAMUCIL/KONSYL) Pack 11/4/2022 at 2055 MAR from Other Facility Yes Yes   Sig: Take 1 Packet by mouth every day.   tamsulosin (FLOMAX) 0.4 MG capsule 11/4/2022 at 0849 MAR from Other Facility No No   Sig: Take 1 capsule by mouth 1/2 hour after breakfast.   therapeutic multivitamin-minerals (THERAGRAN-M) Tab 11/4/2022 at 0849 MAR from Other Facility Yes Yes   Sig: Take 1 Tablet by mouth every day.   traZODone (DESYREL) 50 MG Tab 11/4/2022 at 1902 MAR from Other Facility Yes Yes   Sig: Take 50 mg by mouth every evening.   vitamin D (VITAMIND D3) 1000 UNIT Tab 11/4/2022 at 0849 MAR from Other Facility No No   Sig: Take 1 tablet by mouth every day.   ziprasidone (GEODON) 40 MG Cap 11/4/2022 at 1933 MAR from Other Facility Yes Yes   Sig: Take 40 mg by mouth 2 times a day.      Facility-Administered Medications: None       Allergies  Allergies   Allergen Reactions    Zosyn [Piperacillin Sod-Tazobactam So] Rash and Itching     Redness/flushed throughout body.     Daptomycin Rash     Body rash  RXN=1/2018      Pcn [Penicillins] Hives and Rash      Rash & hives  RXN=since a kid  Tolerates cephalosporins    Unasyn [Ampicillin-Sulbactam Sodium] Hives, Itching and Swelling     Received doses as recently as 2017 without incident. On 2/18/2018 developed a rash after receiving Unasyn.    Vancomycin Rash     Red man syndrome. Tolerates IV vancomycin at reduced infusion rate.       Physical  Exam  Temp:  [36.2 °C (97.1 °F)-36.7 °C (98.1 °F)] 36.2 °C (97.1 °F)  Pulse:  [] 89  Resp:  [12-32] 20  BP: ()/(54-74) 163/72  SpO2:  [91 %-98 %] 97 %    Physical Exam  Vitals reviewed.   Constitutional:       General: He is not in acute distress.  HENT:      Head: Normocephalic and atraumatic. No contusion.      Right Ear: External ear normal.      Left Ear: External ear normal.      Nose: Nose normal.      Mouth/Throat:      Pharynx: No oropharyngeal exudate.   Eyes:      General:         Right eye: No discharge.         Left eye: No discharge.      Pupils: Pupils are equal, round, and reactive to light.   Cardiovascular:      Rate and Rhythm: Normal rate and regular rhythm.      Heart sounds: No murmur heard.    No friction rub. No gallop.   Pulmonary:      Effort: Pulmonary effort is normal.      Breath sounds: No wheezing or rhonchi.   Abdominal:      General: Bowel sounds are normal. There is no distension.      Palpations: Abdomen is soft.      Tenderness: There is no abdominal tenderness. There is no rebound.   Musculoskeletal:         General: Swelling and tenderness present.      Cervical back: No rigidity. No muscular tenderness.      Comments: Decreased range of motion on left elbow.  Decreased range of motion on left lower extremity due to pain.   Skin:     General: Skin is warm and dry.      Coloration: Skin is not jaundiced.      Findings: Erythema (Left elbow) present.   Neurological:      General: No focal deficit present.      Mental Status: He is alert.      Cranial Nerves: No cranial nerve deficit.      Sensory: No sensory deficit.   Psychiatric:         Mood and Affect: Mood normal.       Fluids  Date 11/08/22 0700 - 11/09/22 0659   Shift 8802-2111 8848-2370 9328-0845 24 Hour Total   INTAKE   Shift Total       OUTPUT   Urine 350   350   Shift Total 350   350   Weight (kg) 139.1 139.1 139.1 139.1       Laboratory  Recent Labs     11/06/22  0502 11/07/22  0605 11/08/22  0520   WBC  14.6* 15.1* 13.0*   RBC 3.97* 3.85* 3.84*   HEMOGLOBIN 11.9* 11.5* 11.3*   HEMATOCRIT 35.3* 33.6* 33.1*   MCV 88.9 87.3 86.2   MCH 30.0 29.9 29.4   MCHC 33.7 34.2 34.1   RDW 48.3 47.5 46.5   PLATELETCT 101* 121* 137*   MPV 12.1 12.3 12.2     Recent Labs     11/07/22  1723 11/07/22  2340 11/08/22  0520   SODIUM 135 133* 135   POTASSIUM 3.2* 3.4* 3.5*   CHLORIDE 106 104 107   CO2 17* 17* 18*   GLUCOSE 182* 201* 191*   BUN 65* 65* 63*   CREATININE 1.75* 1.59* 1.44*   CALCIUM 8.7 8.7 8.9                     Imaging  DX-CHEST-PORTABLE (1 VIEW)   Final Result      1.  Stable cardiomegaly and interstitial edema.   2.  Stable linear atelectasis in the right lower lung.         US-EXTREMITY NON VASCULAR UNILATERAL LEFT   Final Result      Soft tissue edema in the area of concern about the left elbow without evidence of fluid collection/abscess.      EC-ECHOCARDIOGRAM COMPLETE W/ CONT   Final Result      DX-ELBOW-COMPLETE 3+ LEFT   Final Result      Normal elbow series.      CT-ABDOMEN-PELVIS W/O   Final Result      1.  No evidence of bowel obstruction or focal inflammatory change.      2.  Gallstones.      3.  Mildly distended colon which also contains fluid. No evidence of bowel obstruction.      DX-CHEST-PORTABLE (1 VIEW)   Final Result      1.  Cardiomegaly. There is also interstitial edema.      2.  Right IJ central line present without evidence of complication.      3.  Linear atelectasis within the right lung base.      DX-CHEST-PORTABLE (1 VIEW)   Final Result      1.  Hazy opacity at the right lower lung, similar to 5/25/2021 exam. Scarring versus pneumonia.      MR-ELBOW-WITH LEFT    (Results Pending)       Assessment/Plan  * Severe sepsis with septic shock (HCC)- (present on admission)  Assessment & Plan  Continue intravenous antimicrobial therapy  Shock has resolved - he is off vasopressor (levophed).  Sepsis is improving  Taper hydrocortisone, 50 mg IV every 12 hours    Swelling of left elbow joint  Assessment &  Plan  Edema and Erythema of Left Elbow Joint - Native Joint  Significant Tenderness to palpation.   MRI pending, Consider Septic Arthiritis, will consult ortho pending results.  I changed MRI from without contrast to with contrast to evaluate for septic arthritis.      Acute on chronic heart failure (HCC)  Assessment & Plan  Hx of Nonischemic Cardiomyopathy  Lower Extremity Edema   BNP > 33,000  Echo from 2021 with EF of 55%  Repeat echo this admission revealed a EF of 25 to 30%    Streptococcal bacteremia  Assessment & Plan  Blood cultures positive for streptococcus, source unclear  Recent TTE negative for endocarditis  ID consulted and following          Continue IV Ancef 2g q 8 hrs   I discussed plan of care with him.    Thrombocytopenia (HCC)- (present on admission)  Assessment & Plan  Stable current platelet count is 137.  No signs of active bleeding.    Gastroenteritis  Assessment & Plan  Started 4 days prior to Admission  CT Abdomen unremarkable.  Improving slightly.  Will continue to monitor  Antidiarrheals prn      Bacteremia due to Gram-positive bacteria  Assessment & Plan  Group A streptococcus Bacteremia, source unclear   Was in Septic Shock  Initially given Fluid administration per sepsis protocol.   Started on Pressor Levophed on 11/5 -> D/Cd on 11/8.    Started on Hydrocortisone 11/6 - Stress dose, hx of adrenal insuff.  Abdominal CT and Chest Xray unremarkable.  TTE without evidence of endocarditis  UA mild UTI, culture negative to date.  Suspected left elbow as infection source.    ID consulted, pt started on cefepime and flagyl empirically.     On 11/6 daptomycin was added for Enterococcus coverage.    On 11/7 cefepime, flagyl, & daptomycin D/Cd. Started IV Ancef 2 g every 8 hours. Rpt Blood Cx obtained.     - Taper hydrocortisone, 50 mg IV every 12 hours  - Continue IV Ancef 2 g q8 hrs  - Continue to Follow Rpt Blood Cxs, negative to date.   - MRI left elbow pending - suspecting Septic Arthritis.  Will consult Ortho, pending results.     Permanent atrial fibrillation (HCC)- (present on admission)  Assessment & Plan  Currently in atrial fibrillation with RVR.   Rate controlled              Continue apixaban, 5 mg twice daily              Continue amiodarone  Will restart metoprolol as BP is stable.     Hypothyroidism- (present on admission)  Assessment & Plan  Continue Synthroid    Acute kidney injury (HCC)  Assessment & Plan  Initially Creatinine of 3.13 and GFR of 22  Improving with Creat of 1.44 and GFR of 56 today     -Daily BMP to monitor   -Strict Is/Os   -Avoid Nephrotoxins and Renally Dose Meds       Obesity due to excess calories with serious comorbidity- (present on admission)  Assessment & Plan  Will need counseling when clinically appropriate    Hypokalemia- (present on admission)  Assessment & Plan  From diarrhea  Replace        I discussed plan of care with intensivist Dr. Wicho Johnson M.D.  I discussed plan of care with bedside RN.

## 2022-11-08 NOTE — THERAPY
Physical Therapy   Initial Evaluation     Patient Name: Sebastián Castro  Age:  57 y.o., Sex:  male  Medical Record #: [id number]  Today's Date: 11/7/2022     Precautions  Precautions: Fall Risk    Assessment  Patient is a 57 y.o. male who was admitted with septic shock due to urine source. Pt also diagnosed with acute renal failure, hyponatremia, and hypokalemia. PMH significant for nonischemic cardiomyopathy, Afib, adrenal insufficiency, HFpEF, diabetes, bowel perforation s/p ex-lap with colostomy.    Pt received in bed and agreeable to PT session. Pt presents with generalized weakness and decreased activity tolerance compared to baseline. Pt required overall mod A for bed mobility and multiple sit<>stands at EOB with BUE support. Pt will benefit from continued acute PT to progress function towards baseline. Pt lives in long term care at [facility], so would anticipate pt to return there with continued PT upon discharge.    Plan    Recommend Physical Therapy 3 times per week until therapy goals are met for the following treatments:  Bed Mobility, Gait Training, Neuro Re-Education / Balance, Self Care/Home Evaluation, Stair Training, Therapeutic Activities, and Therapeutic Exercises    DC Equipment Recommendations: Unable to determine at this time  Discharge Recommendations: Recommend post-acute placement for additional physical therapy services prior to discharge home (Anticipate return to [facility], likely would benefit from more PT there.)     Objective       11/07/22 1545   Precautions   Precautions Fall Risk   Vitals   Vitals Comments SBP in 100's in sitting. Pt asymptomatic.   Pain 0 - 10 Group   Therapist Pain Assessment Post Activity Pain Same as Prior to Activity;Nurse Notified  (no c/o pain)   Prior Living Situation   Prior Services Intermittent Physical Support for ADL Per Service   Housing / Facility Long Term Care Facility   Equipment Owned 4-Wheel Walker   Lives with - Patient's Self Care Capacity  Attendant / Paid Care Giver   Comments Pt reports that he lives in long term care at [facility].   Prior Level of Functional Mobility   Comments Pt reports ambulating and transferring independently with a 4WW at his facility. He receives assist for showers and IADLs.   History of Falls   History of Falls No   Date of Last Fall   (denies)   Cognition    Level of Consciousness Alert   Comments Very pleasant and cooperative.   Passive ROM Lower Body   Comments Hip flexion limited by body habitus   Strength Lower Body   Comments Generalized weakness, grossly 3+/5 with MMT, no buckling in standing   Sensation Lower Body   Comments Pt reports impaired sensation to B feet   Lower Body Muscle Tone   Lower Body Muscle Tone  WDL   Coordination Lower Body    Coordination Lower Body  WDL   Balance Assessment   Sitting Balance (Static) Fair -   Sitting Balance (Dynamic) Fair -   Standing Balance (Static) Fair -   Standing Balance (Dynamic) Poor +   Weight Shift Sitting Poor   Weight Shift Standing Poor   Comments BUE support in standing, no overt losses of balance   Gait Analysis   Gait Level Of Assist Unable to Participate   Comments Pt too fatigued to attempt, able to shuffle for 2 side steps at EOB.   Bed Mobility    Supine to Sit Moderate Assist   Sit to Supine Moderate Assist   Scooting Moderate Assist   Comments Cues for sequencing   Functional Mobility   Sit to Stand Moderate Assist   Bed, Chair, Wheelchair Transfer Unable to Participate   Mobility STS x3 from EOB   How much difficulty does the patient currently have...   Turning over in bed (including adjusting bedclothes, sheets and blankets)? 2   Sitting down on and standing up from a chair with arms (e.g., wheelchair, bedside commode, etc.) 1   Moving from lying on back to sitting on the side of the bed? 1   How much help from another person does the patient currently need...   Moving to and from a bed to a chair (including a wheelchair)? 2   Need to walk in a  hospital room? 1   Climbing 3-5 steps with a railing? 1   6 clicks Mobility Score 8   Short Term Goals    Short Term Goal # 1 Pt will perform bed mobility with supervision to progress function in 6 visits.   Short Term Goal # 2 Pt will perform functional transfers with FWW and supervision to progress mobility in 6 visits.   Short Term Goal # 3 Pt will ambulate 50ft with FWW and min A to progress mobility in 6 visits.   Education Group   Education Provided Role of Physical Therapist   Role of Physical Therapist Patient Response Patient;Acceptance;Explanation;Verbal Demonstration   Problem List    Problems Impaired Bed Mobility;Impaired Transfers;Impaired Ambulation;Functional Strength Deficit;Impaired Balance;Decreased Activity Tolerance   Anticipated Discharge Equipment and Recommendations   DC Equipment Recommendations Unable to determine at this time   Discharge Recommendations Recommend post-acute placement for additional physical therapy services prior to discharge home  (Anticipate return to [facility], likely would benefit from more PT there.)   Interdisciplinary Plan of Care Collaboration   IDT Collaboration with  Nursing   Patient Position at End of Therapy In Bed;Call Light within Reach;Tray Table within Reach;Phone within Reach   Collaboration Comments RN present to assist during session   Session Information   Date / Session Number  11/7-1(1/3, 11/13)

## 2022-11-08 NOTE — PROGRESS NOTES
[facility] ICU Progress Note      Admit Date: 11/5/2022  ICU Day:   [] 3      Resident(s): Andressa Shin M.D.  Attending: QUYNE HANCOCK/ Dr. Johnson    Date & Time:   11/8/2022   7:02 AM       Patient ID:    Name:             Sebastián Castro     YOB: 1965  Age:                 57 y.o.  male   MRN:               [id number]    HPI:  Patient is a 57 year-old male with a past medical history of nonischemic cardiomyopathy, permanent atrial fibrillation (on amiodarone and Eliquis), insulin-dependent type 2 diabetes mellitus, and previous history of hospitalization for bowel perforation s/p exploratory laparotomy admitted for septic shock with strep bacteremia (unknown source), on cefepime, Flagyl, and daptomycin per infectious disease. Blood cultures show Group A strep, stopped previous antibiotics and started on IV Ancef. LUE US showed soft tissue swelling, concerns for septic joint as source - MRI L elbow pending     Consultants:  Infectious Disease     Procedures:  None    Imaging:  DX-CHEST-PORTABLE (1 VIEW)   Final Result      1.  Stable cardiomegaly and interstitial edema.   2.  Stable linear atelectasis in the right lower lung.         US-EXTREMITY NON VASCULAR UNILATERAL LEFT   Final Result      Soft tissue edema in the area of concern about the left elbow without evidence of fluid collection/abscess.      EC-ECHOCARDIOGRAM COMPLETE W/ CONT   Final Result      DX-ELBOW-COMPLETE 3+ LEFT   Final Result      Normal elbow series.      CT-ABDOMEN-PELVIS W/O   Final Result      1.  No evidence of bowel obstruction or focal inflammatory change.      2.  Gallstones.      3.  Mildly distended colon which also contains fluid. No evidence of bowel obstruction.      DX-CHEST-PORTABLE (1 VIEW)   Final Result      1.  Cardiomegaly. There is also interstitial edema.      2.  Right IJ central line present without evidence of complication.      3.  Linear atelectasis within the right lung base.       DX-CHEST-PORTABLE (1 VIEW)   Final Result      1.  Hazy opacity at the right lower lung, similar to 5/25/2021 exam. Scarring versus pneumonia.      MR-ELBOW-W/O LEFT    (Results Pending)         Interval Events:  No acute events overnight. Levophed stopped. Antibiotics changed to IV Ancef, per ID given blood cultures positive for group A strep. Leukocytosis improving. MRI L elbow pending to assess for septic joint as possible source of bacteremia.    Review of Systems   Constitutional:  Negative for chills and fever.   Respiratory:  Negative for cough, shortness of breath and wheezing.    Cardiovascular:  Negative for chest pain and palpitations.   Gastrointestinal:  Negative for abdominal pain, nausea and vomiting.   Musculoskeletal:  Positive for joint pain (L elbow, improving).   Neurological:  Negative for dizziness, weakness and headaches.     PHYSICAL EXAM    Physical Exam  Constitutional:       General: He is not in acute distress.     Appearance: He is not ill-appearing or toxic-appearing.   HENT:      Head: Normocephalic and atraumatic.      Right Ear: External ear normal.      Left Ear: External ear normal.      Nose: Nose normal.   Eyes:      General: No scleral icterus.     Extraocular Movements: Extraocular movements intact.      Conjunctiva/sclera: Conjunctivae normal.   Cardiovascular:      Rate and Rhythm: Normal rate. Rhythm irregular.      Heart sounds: No murmur heard.  Pulmonary:      Effort: Pulmonary effort is normal. No respiratory distress.      Breath sounds: No wheezing.   Abdominal:      Palpations: Abdomen is soft.      Tenderness: There is no abdominal tenderness. There is no guarding.   Musculoskeletal:         General: Swelling (mild, L elbow) present. No tenderness or deformity.   Skin:     General: Skin is warm and dry.   Neurological:      Mental Status: He is alert and oriented to person, place, and time.   Psychiatric:         Mood and Affect: Mood normal.         Behavior:  Behavior normal.        Respiratory:     Respiration: 18, Pulse Oximetry: 98 %    Chest Tube Drains:          HemoDynamics:  Pulse: 90 Blood Pressure: 113/68      Neuro:      Fluids:       Intake/Output Summary (Last 24 hours) at 2022 0702  Last data filed at 2022 0600  Gross per 24 hour   Intake 1048.58 ml   Output 2100 ml   Net -1051.42 ml         Body mass index is 41.59 kg/m².    Recent Labs     22  0502 22  1230 22  0605 22  0934 22  1723 22  2340 22  0520   SODIUM 126*   < > 133*   < > 135 133* 135   POTASSIUM 3.6   < > 3.0*   < > 3.2* 3.4* 3.5*   CHLORIDE 96   < > 104   < > 106 104 107   CO2 14*   < > 16*   < > 17* 17* 18*   BUN 71*   < > 70*   < > 65* 65* 63*   CREATININE 3.04*   < > 2.04*   < > 1.75* 1.59* 1.44*   MAGNESIUM 1.8  --  2.3  --   --   --  2.1   PHOSPHORUS  --   --  2.9  --   --   --  1.9*   CALCIUM 8.6   < > 8.6   < > 8.7 8.7 8.9    < > = values in this interval not displayed.       GI/Nutrition:  Recent Labs     22  0502 22  1230 22  0605 22  0934 22  1723 11/07/22  2340 22  0520   ALTSGPT 56*  --  59*  --   --   --  82*   ASTSGOT 97*  --  87*  --   --   --  127*   ALKPHOSPHAT 114*  --  147*  --   --   --  152*   TBILIRUBIN 3.3*  --  2.5*  --   --   --  3.0*   GLUCOSE 186*   < > 191*   < > 182* 201* 191*    < > = values in this interval not displayed.       Heme:  Recent Labs     22  0502 22  0605 22  0520   RBC 3.97* 3.85* 3.84*   HEMOGLOBIN 11.9* 11.5* 11.3*   HEMATOCRIT 35.3* 33.6* 33.1*   PLATELETCT 101* 121* 137*       Infectious Disease:  Monitored Temp 2  Av.4 °C (97.6 °F)  Min: 36.3 °C (97.3 °F)  Max: 36.5 °C (97.7 °F)  Temp  Av.4 °C (97.5 °F)  Min: 36.3 °C (97.3 °F)  Max: 36.5 °C (97.7 °F)  Recent Labs     22  0502 22  0605 22  0520   WBC 14.6* 15.1* 13.0*   NEUTSPOLYS  --  98.20* 88.10*   LYMPHOCYTES  --  0.90* 5.80*   MONOCYTES  --  0.90 3.80    EOSINOPHILS  --  0.00 0.00   BASOPHILS  --  0.00 0.20   ASTSGOT 97* 87* 127*   ALTSGPT 56* 59* 82*   ALKPHOSPHAT 114* 147* 152*   TBILIRUBIN 3.3* 2.5* 3.0*       Meds:   potassium chloride in water  20 mEq      hydrocortisone sodium succinate PF  50 mg      potassium chloride SA  20 mEq      amiodarone  400 mg      Followed by    [START ON 11/12/2022] amiodarone  400 mg      Followed by    [START ON 11/17/2022] amiodarone  200 mg      ceFAZolin  2 g Stopped (11/08/22 0558)    insulin regular  2-9 Units      And    dextrose bolus  25 g      acetaminophen  650 mg      levothyroxine  150 mcg      apixaban  5 mg      traZODone  50 mg      senna-docusate  2 Tablet      And    polyethylene glycol/lytes  1 Packet      And    magnesium hydroxide  30 mL      And    bisacodyl  10 mg      nystatin              Assessment and Plan:  Streptococcal bacteremia  Assessment & Plan  Blood cultures positive for streptococcus  ID following, appreciate recs   Continue IV Ancef 2g  Pending L elbow MRI - consider possible septic joint as source. Consider orthopedic surgery consult once imaging results  Recent TTE negative for endocarditis    * Severe sepsis with septic shock (HCC)  Assessment & Plan  This is Septic shock Present on admission  SIRS criteria identified on my evaluation include: Tachycardia, with heart rate greater than 90 BPM and Tachypnea, with respirations greater than 20 per minute  Indicators of septic shock include: Severe sepsis present, persistent hypotension after 30 ml/kg completed, and initial lactate level result is >= 4 mmol/L.   Sources is: urine, possible abdominal/Skin/soft tissue/joint  Sepsis protocol initiated  Crystalloid Fluid Administration: Fluid resuscitation ordered per standard protocol - 30 mL/kg per current or ideal body weight  IV antibiotics as appropriate for source of sepsis  Reassessment: I have reassessed the patient's hemodynamic status         - Improving       - Blood cultures positive  for strep bacteremia  - Unremarkable abdominal ct, cxr. Possible left heel wound vs left elbow as infectious source, urine slightly positive for infection       - ID consulted, appreciate recommendations  Continue cefepime and Flagyl (started 11/5)  Continue daptomycin, for enterococcus coverage (started 11/6)  Cefepime, Flagyl, Daptomycin stopped 11/7/22. IV Ancef 2g every 8 hours started  Repeat blood cultures, per ID  - Levophed stopped  - Taper hydrocortisone  - MRI left elbow pending for consideration of septic arthritis. To consider orthopedic surgery consult once imaging results  - Wound care    Permanent atrial fibrillation (HCC)  Assessment & Plan  Continue home eliquis and amiodarone    Hypothyroidism  Assessment & Plan  Continue home synthroid    Acute kidney injury (HCC)  Assessment & Plan  Creatinine 1.44, improving (baseline ~1.07)  - Likely prerenal. Hold fluids for now given low EF and elevated BNP  - Trend    Thrombocytopenia (HCC)  Assessment & Plan  Likely reactive secondary to sepsis, Improving  - No active signs of bleeding    Nonischemic cardiomyopathy ([facility])  Assessment & Plan  History of nonischemic cardiomyopathy, unclear etiology however previously reduced ejection fraction-resolved  - Last echo 2021 with EF of 55%  - Repeat echocardiogram ordered    Obesity due to excess calories with serious comorbidity  Assessment & Plan  Outpatient follow up    Hypokalemia  Assessment & Plan  Monitor and replace as needed, goal >4      Hyponatremia  Assessment & Plan  Calculated serum osmolality 75, hypotonic likely in the setting of hypovolemia  - Patient received septic bolus  - RESOLVED        Quality Measures:  Feeding:  Soft/bite sized  Analgesia: None  Sedation: None  Thromboprophylaxis: Eliquis  Head of bed: >30 degrees  Ulcer prophylaxis: N/A  Glycemic control: Moderate sliding scale  Bowel care: Bowel regimen  Indwelling lines: pIV  Deescalation of antibiotics: Now on IV Ancef (11/8/22), per ID                           CODE STATUS:                     Full  DISPO:                                    Clinically stable to transfer to medical floor

## 2022-11-08 NOTE — THERAPY
Occupational Therapy   Initial Evaluation     Patient Name: Sebastián Castro  Age:  57 y.o., Sex:  male  Medical Record #: [id number]  Today's Date: 11/8/2022     Precautions  Precautions: Fall Risk    Assessment  Patient is 57 y.o. male admitted for severe sepsis with septic shock and SRUTHI; also with edema and erythema of left elbow with unclear etiology, UTI, acute on chronic heart failure, bacteremia, hypokalemia, and gastroenteritis. PMHx of nonischemic cardiomyopathy, permanent AF, insulin-dependent diabetes type 2, obesity, and perforated bowel (s/p colectomy and colostomy takedown in 02/2021). Pt is pleasant and cooperative; motivated. Pt reports lives on long care side of [facility]; reports they are able to assist him PRN 24/7 and was also getting PT. Fatigues quickly in standing. Encouraged continued daily OOB activity with [facility] staff. Pt appears to have baseline cognitive deficits especially with safety awareness and sequencing; [facility] completes IADLs for pt at baseline. Currently limited by decreased functional mobility, activity tolerance, cognition, strength, AROM, balance, and pain which are currently affecting pt's ability to complete ADLs/IADLs at baseline. Will continue to follow.     Plan    Recommend Occupational Therapy 3 times per week until therapy goals are met for the following treatments:  Adaptive Equipment, Neuro Re-Education / Balance, Self Care/Activities of Daily Living, Therapeutic Activities, and Therapeutic Exercises.    DC Equipment Recommendations: Unable to determine at this time  Discharge Recommendations: Recommend post-acute placement for additional occupational therapy services prior to discharge home (could return back to [facility] if pt is able to get additional assist for ADLs)      Objective     11/08/22 1027   Prior Living Situation   Prior Services Intermittent Physical Support for ADL Per Service   Housing / Facility Long Term Care [facility]  ([facility])    Bathroom Set up Walk In Shower;Shower Chair   Equipment Owned 4-Wheel Walker;Tub / Shower Seat;Raised Toilet Seat With Arms;Bed Side Commode   Lives with - Patient's Self Care Capacity Attendant / Paid Care Giver   Comments Pt reports lives on long care side of [facility]; reports they are able to assist him PRN 24/7 and was also getting PT.   Prior Level of ADL Function   Self Feeding Independent   Grooming / Hygiene Independent   Bathing Requires Assist   Dressing Requires Assist   Toileting Independent   Prior Level of IADL Function   Medication Management Dependent   Laundry Dependent   Kitchen Mobility Dependent   Finances Dependent   Home Management Dependent   Shopping Dependent   Prior Level Of Mobility Supervision With Device in Home   Driving / Transportation Relatives / Others Provide Transportation   Precautions   Precautions Fall Risk   Vitals   O2 (LPM) 1   O2 Delivery Device Silicone Nasal Cannula   Vitals Comments vitals stable   Pain 0 - 10 Group   Location Elbow   Location Orientation Left   Therapist Pain Assessment Post Activity;During Activity;Nurse Notified  (not quantified)   Cognition    Cognition / Consciousness X   Level of Consciousness Alert   Safety Awareness Impaired   Sequencing Impaired   Comments pleasant and cooperative; motivated. Pt appears to have baseline cognitive deficits; [facility] completes IADLs for pt.   Passive ROM Upper Body   Passive ROM Upper Body WDL   Active ROM Upper Body   Active ROM Upper Body  X   Comments L elbow limited by pain   Strength Upper Body   Upper Body Strength  X   Gross Strength Generalized Weakness, Equal Bilaterally.    Comments limited by pain at L elbow   Balance Assessment   Sitting Balance (Static) Fair -   Sitting Balance (Dynamic) Fair -   Standing Balance (Static) Fair -   Standing Balance (Dynamic) Poor +   Weight Shift Sitting Fair   Weight Shift Standing Poor   Comments w/FWW in standing, BUE support in sitting. Pt w/ R lean in  sitting and supine; reports this is baseline   Bed Mobility    Supine to Sit Moderate Assist  (tried to bring torso up before BLEs off the EOB)   Sit to Supine   (left up in chair)   Scooting Moderate Assist   Comments v/cs for sequencing; HOB elevated   ADL Assessment   Eating Supervision  (drinking water)   Grooming Supervision;Seated  (wiping face)   Lower Body Dressing Maximal Assist  (socks; reports gets assist at baseline)   Toileting Maximal Assist  (frye, but likely could get to BSC)   Comments Educated on importance of continued OOB activity, as least 3x/day for meals   Functional Mobility   Sit to Stand Moderate Assist   Bed, Chair, Wheelchair Transfer Moderate Assist   Toilet Transfers   (declined need)   Transfer Method Stand Step   Mobility w/FWW; bed>chair   Comments pt sitting before fully lined up with chair d/t fatigue   ICU Target Mobility Level   ICU Mobility - Targeted Level Level 3B   Edema / Skin Assessment   Edema / Skin  Not Assessed   Activity Tolerance   Comments left seated in chair; c/o fatigue after txf   Patient / Family Goals   Patient / Family Goal #1 to get stronger   Short Term Goals   Short Term Goal # 1 seated g/h with SPV for >2 min with BUEs   Short Term Goal # 2 BSC txf with min A   Short Term Goal # 3 UB dressing with SPV   Short Term Goal # 4 toileting with min A   Education Group   Education Provided Role of Occupational Therapist;Transfers;Pathology of bedrest   Role of Occupational Therapist Patient Response Patient;Acceptance;Explanation;Verbal Demonstration;Reinforcement Needed   Transfers Patient Response Patient;Acceptance;Explanation;Reinforcement Needed;Action Demonstration   Pathology of Bedrest Patient Response Patient;Acceptance;Explanation;Verbal Demonstration;Reinforcement Needed   Problem List   Problem List Decreased Active Daily Living Skills;Decreased Homemaking Skills;Decreased Upper Extremity Strength Right;Decreased Upper Extremity Strength  Left;Decreased Upper Extremity AROM Left;Decreased Functional Mobility;Decreased Activity Tolerance;Safety Awareness Deficits / Cognition;Impaired Cognitive Function;Impaired Postural Control / Balance

## 2022-11-08 NOTE — PROGRESS NOTES
CRITICAL CARE MEDICINE ATTENDING PROGRESS NOTE    Date of admission  11/5/2022    Chief Complaint  57 y.o. male admitted 11/5/2022 with septic shock.  He has a history of non-ischemic cardiomyopathy, permanent AF, DM 2 and a perforated bowel.    Hospital Course      11/7 -    titrating norepinephrine.  Continue antibiotics.  Change amiodarone to enteral preparation.  11/8 -    off norepinephrine.  Okay to transfer out of ICU.  Taper hydrocortisone.  Continue cefazolin per ID.  MRI left elbow to evaluate for septic arthritis.      Interval Problem Update  Reviewed last 24 hour events:      AF  97.7  -841 mL in the last 24  +2969 mL since admit      He is feeling better today.  He has very mild nausea.  No abdominal pain or vomiting.  No diarrhea.  No cough, sputum production, hemoptysis, wheezing or dyspnea.  No angina, palpitations or syncope.  He tells me that he believes that his left elbow has been swollen and red for about 4 days.      Review of Systems  Review of Systems   Constitutional:  Negative for chills and fever.   HENT:  Negative for ear discharge, ear pain, sore throat and tinnitus.    Eyes:  Negative for pain, discharge and redness.   Respiratory:  Negative for cough, hemoptysis, shortness of breath and stridor.    Cardiovascular:  Negative for chest pain and palpitations.   Gastrointestinal:  Positive for nausea. Negative for abdominal pain and vomiting.   Genitourinary:  Negative for dysuria, hematuria and urgency.   Musculoskeletal:  Positive for joint pain. Negative for myalgias.   Neurological:  Negative for seizures and headaches.   Endo/Heme/Allergies:  Does not bruise/bleed easily.   Psychiatric/Behavioral:  Negative for hallucinations. The patient is not nervous/anxious.      Vital Signs for the last 24 hours  Temp:  [36.3 °C (97.3 °F)-36.5 °C (97.7 °F)] 36.5 °C (97.7 °F)  Pulse:  [] 90  Resp:  [12-32] 18  BP: ()/(51-74) 113/68  SpO2:  [91 %-98 %] 98 %    Hemodynamic parameters  for the last 24 hours       Vent Settings for the last 24 hours       Physical Exam  Physical Exam  Constitutional:       General: He is not in acute distress.     Appearance: He is not ill-appearing, toxic-appearing or diaphoretic.   HENT:      Head: Normocephalic and atraumatic.      Right Ear: External ear normal.      Left Ear: External ear normal.      Nose: Nose normal.      Mouth/Throat:      Pharynx: Oropharynx is clear.   Eyes:      Pupils: Pupils are equal, round, and reactive to light.   Cardiovascular:      Comments: Atrial fibrillation  Pulmonary:      Effort: Pulmonary effort is normal. No respiratory distress.      Breath sounds: No stridor. Rales (Few crackles at the bases) present. No wheezing.   Abdominal:      General: There is no distension.      Tenderness: There is no abdominal tenderness. There is no guarding or rebound.   Musculoskeletal:      Cervical back: Normal range of motion.      Right lower leg: Edema present.      Left lower leg: Edema present.      Comments: The left elbow region is warm to the touch with swelling and erythema.  There is no fluctuance.   Skin:     Capillary Refill: Capillary refill takes less than 2 seconds.   Neurological:      General: No focal deficit present.      Mental Status: He is alert and oriented to person, place, and time.      Cranial Nerves: No cranial nerve deficit.       Medications  Current Facility-Administered Medications   Medication Dose Route Frequency Provider Last Rate Last Admin    potassium chloride in water (KCL) ivpb **Administer in ICU only** 20 mEq  20 mEq Intravenous Once Wicho Johnson M.D.        hydrocortisone sodium succinate PF (Solu-CORTEF) 100 MG injection 50 mg  50 mg Intravenous Q12HRS Wicho Johnson M.D.        potassium chloride SA (Kdur) tablet 20 mEq  20 mEq Oral TID Wicho Johnson M.D.        amiodarone (Cordarone) tablet 400 mg  400 mg Oral TWICE DAILY Wicho Johnson M.D.   400 mg at  11/08/22 0525    Followed by    [START ON 11/12/2022] amiodarone (Cordarone) tablet 400 mg  400 mg Oral Q DAY Wicho Johnson M.D.        Followed by    [START ON 11/17/2022] amiodarone (Cordarone) tablet 200 mg  200 mg Oral DAILY Wicho Johnson M.D.        ceFAZolin (Ancef) 2 g in  mL IVPB  2 g Intravenous Q8HRS Rai Rodriguez M.D.   Stopped at 11/08/22 0558    insulin regular (HumuLIN R,NovoLIN R) injection  2-9 Units Subcutaneous 4X/DAY VESTA Machado M.D.   2 Units at 11/08/22 0638    And    dextrose 10 % BOLUS 25 g  25 g Intravenous Q15 MIN PRN Freddie Machado M.D.        acetaminophen (Tylenol) tablet 650 mg  650 mg Oral Q6HRS PRN Eric Sanchez M.D.   650 mg at 11/06/22 0357    levothyroxine (SYNTHROID) tablet 150 mcg  150 mcg Oral AM ES Mio Reveles M.D.   150 mcg at 11/08/22 0526    apixaban (ELIQUIS) tablet 5 mg  5 mg Oral BID Mio Reveles M.D.   5 mg at 11/08/22 0526    traZODone (DESYREL) tablet 50 mg  50 mg Oral Nightly Mio Reveles M.D.   50 mg at 11/07/22 2129    senna-docusate (PERICOLACE or SENOKOT S) 8.6-50 MG per tablet 2 Tablet  2 Tablet Oral BID Sony Bruno D.O.        And    polyethylene glycol/lytes (MIRALAX) PACKET 1 Packet  1 Packet Oral QDAY PRN DANGELO Holt.O.        And    magnesium hydroxide (MILK OF MAGNESIA) suspension 30 mL  30 mL Oral QDAY PRN DANGELO Holt.O.        And    bisacodyl (DULCOLAX) suppository 10 mg  10 mg Rectal QDAY PRN Sony Bruno D.O.        nystatin (MYCOSTATIN) powder   Topical BID Mio Reveles M.D.   Given at 11/08/22 0532       Fluids    Intake/Output Summary (Last 24 hours) at 11/8/2022 0642  Last data filed at 11/8/2022 0600  Gross per 24 hour   Intake 1048.58 ml   Output 2100 ml   Net -1051.42 ml       Laboratory      Recent Labs     11/06/22  0502 11/06/22  1230 11/07/22  0605 11/07/22  0934 11/07/22  1723 11/07/22  2340 11/08/22  0520   SODIUM 126*   < > 133*   < > 135 133* 135    POTASSIUM 3.6   < > 3.0*   < > 3.2* 3.4* 3.5*   CHLORIDE 96   < > 104   < > 106 104 107   CO2 14*   < > 16*   < > 17* 17* 18*   BUN 71*   < > 70*   < > 65* 65* 63*   CREATININE 3.04*   < > 2.04*   < > 1.75* 1.59* 1.44*   MAGNESIUM 1.8  --  2.3  --   --   --  2.1   PHOSPHORUS  --   --  2.9  --   --   --  1.9*   CALCIUM 8.6   < > 8.6   < > 8.7 8.7 8.9    < > = values in this interval not displayed.     Recent Labs     11/06/22  0502 11/06/22  1230 11/07/22  0605 11/07/22  0934 11/07/22  1723 11/07/22  2340 11/08/22  0520   ALTSGPT 56*  --  59*  --   --   --  82*   ASTSGOT 97*  --  87*  --   --   --  127*   ALKPHOSPHAT 114*  --  147*  --   --   --  152*   TBILIRUBIN 3.3*  --  2.5*  --   --   --  3.0*   GLUCOSE 186*   < > 191*   < > 182* 201* 191*    < > = values in this interval not displayed.     Recent Labs     11/06/22 0502 11/07/22 0605 11/08/22  0520   WBC 14.6* 15.1* 13.0*   NEUTSPOLYS  --  98.20* 88.10*   LYMPHOCYTES  --  0.90* 5.80*   MONOCYTES  --  0.90 3.80   EOSINOPHILS  --  0.00 0.00   BASOPHILS  --  0.00 0.20   ASTSGOT 97* 87* 127*   ALTSGPT 56* 59* 82*   ALKPHOSPHAT 114* 147* 152*   TBILIRUBIN 3.3* 2.5* 3.0*     Recent Labs     11/06/22  0502 11/07/22  0605 11/08/22  0520   RBC 3.97* 3.85* 3.84*   HEMOGLOBIN 11.9* 11.5* 11.3*   HEMATOCRIT 35.3* 33.6* 33.1*   PLATELETCT 101* 121* 137*       Imaging  None    Assessment/Plan      Severe sepsis with septic shock   Associated acute kidney injury   Continue intravenous antimicrobial chemotherapy   Shock has resolved - he is off vasopressor support   Sepsis is improving   Taper hydrocortisone, 50 mg IV every 12 hours    Group A streptococcus bacteremia   Continue cefazolin per ID   Source may be left elbow - ultrasound without evidence of fluid collection - obtain MRI to evaluate for septic arthritis   TTE without evidence of endocarditis    Acute kidney injury   Improving   Monitor renal function and urine output   Avoid nephrotoxins and renal dose  medications    Acute systolic heart failure   LVEF 25-30%   Begin goal-directed medical therapy when clinically appropriate    Pulmonary hypertension   RVSP 50 mmHg    Atrial fibrillation   Rate controlled   Continue apixaban, 5 mg twice daily   Continue amiodarone   Optimize potassium and magnesium    Diabetes mellitus type 2   Glycohemoglobin 6.0   Sliding scale insulin    Hypokalemia   Replete potassium      VTE:  NOAC  Ulcer: Not Indicated  Lines: Central Line  Ongoing indication addressed and Zabala Catheter  Ongoing indication addressed    I have performed a physical exam and reviewed and updated ROS and Plan today (11/8/2022). In review of yesterday's note (11/7/2022), there are no changes except as documented above.     OK to transfer out of ICU.  [facility] will sign off.  Please call if you have any questions.    Discussed patient condition and risk of morbidity and/or mortality with RN, RT, Pharmacy, Charge nurse / hot rounds, and QA team    A Critical Care Medicine progress note may have been authored by a resident physician or advanced practitioner of nursing under my direct supervision on this date of service.  As the supervising and attending physician, I have either attested to or cosigned that document.  IN THE EVENT THAT DISCREPANCIES EXIST BETWEEN THIS DOCUMENT AND ANY DOCUMENT THAT I HAVE ATTESTED TO OR COSIGNED ON THIS DATE OF SERVICE, THEN THIS DOCUMENT REMAINS THE FINAL AUTHORITY AS TO MY ASSESSMENT AND PLAN REGARDING THE CARE OF THIS PATIENT.    Wicho Johnson MD  Pulmonary and Critical Care Medicine

## 2022-11-08 NOTE — THERAPY
"Physical Therapy   Daily Treatment     Patient Name: Sebastián Castro  Age:  57 y.o., Sex:  male  Medical Record #: [id number]  Today's Date: 11/8/2022     Precautions  Precautions: Fall Risk    Assessment    Pt received in bed and eager to participate in PT session to mobilize. Pt required mod A and extra time to complete sup>sit. From there, pt again required mod A for STS and stand step transfer to the chair at bedside. Pt encouraged to perform seated exercises in the chair and request to get up even when therapy is not able to come by. Will continue to follow for acute PT to progress as able.    Plan    Continue current treatment plan.    DC Equipment Recommendations: Unable to determine at this time  Discharge Recommendations: Recommend post-acute placement for additional physical therapy services prior to discharge home (Anticipate return to [facility], likely would benefit from more PT there.)      Subjective    \"I'd love to get out of bed!\"     Objective       11/08/22 1101   Precautions   Precautions Fall Risk   Vitals   Vitals Comments Vitals stable. Pt asymptomatic   Pain 0 - 10 Group   Therapist Pain Assessment Post Activity Pain Same as Prior to Activity;Nurse Notified  (L elbow pain, not rated)   Cognition    Level of Consciousness Alert   Comments Pleasant and motivated to   Sitting Lower Body Exercises   Comments Edu on performing seated exercises while up in the chair, including ankle pumps, LAQ, and marching. Pt reports knowing the exercises from PT at his SNF.   Balance   Sitting Balance (Static) Fair -   Sitting Balance (Dynamic) Fair -   Standing Balance (Static) Fair -   Standing Balance (Dynamic) Poor +   Weight Shift Sitting Poor   Weight Shift Standing Poor   Skilled Intervention Tactile Cuing;Verbal Cuing;Compensatory Strategies   Comments FWW in standing, cues for positioning.   Gait Analysis   Gait Level Of Assist Unable to Participate   Comments bed>chair only this session   Bed Mobility  "   Supine to Sit Moderate Assist   Sit to Supine   (up in chair)   Scooting Moderate Assist   Skilled Intervention Verbal Cuing   Functional Mobility   Sit to Stand Moderate Assist   Bed, Chair, Wheelchair Transfer Moderate Assist   Transfer Method Stand Step   Mobility bed>chair with FWW   How much difficulty does the patient currently have...   Turning over in bed (including adjusting bedclothes, sheets and blankets)? 2   Sitting down on and standing up from a chair with arms (e.g., wheelchair, bedside commode, etc.) 1   Moving from lying on back to sitting on the side of the bed? 1   How much help from another person does the patient currently need...   Moving to and from a bed to a chair (including a wheelchair)? 2   Need to walk in a hospital room? 2   Climbing 3-5 steps with a railing? 1   6 clicks Mobility Score 9   Short Term Goals    Short Term Goal # 1 Pt will perform bed mobility with supervision to progress function in 6 visits.   Goal Outcome # 1 goal not met   Short Term Goal # 2 Pt will perform functional transfers with FWW and supervision to progress mobility in 6 visits.   Goal Outcome # 2 Goal not met   Short Term Goal # 3 Pt will ambulate 50ft with FWW and min A to progress mobility in 6 visits.   Goal Outcome # 3 Goal not met   Anticipated Discharge Equipment and Recommendations   DC Equipment Recommendations Unable to determine at this time   Interdisciplinary Plan of Care Collaboration   IDT Collaboration with  Nursing   Patient Position at End of Therapy Seated;Call Light within Reach;Tray Table within Reach;Phone within Reach   Collaboration Comments RN updated   Session Information   Date / Session Number  11/8-2(2/3, 11/13)

## 2022-11-08 NOTE — CONSULTS
[facility] CONSULT IMCU NOTE     Attending:   Dr. Fox     Resident:   Mariposa Galindo MD    Referring Provider for Consult:  Dr. Wicho Johnson - Critical Care     PATIENT:   Sebastián Castro; [id number]; 1965    ID:   57 y.o. male admitted for septic shock    Hospital Course:   57-year-old male with past medical history of nonischemic cardiomyopathy, permanent AF on amiodarone and Eliquis, insulin-dependent diabetes type 2, and perforated bowel (s/p colectomy and colostomy takedown in 02/2021) who presented to the ED on 11/5/2022 with septic shock and secondary SRUTHI. Patient had diarrhea and a swollen left elbow that began 4 days prior to admission. Fluid administration per sepsis protocol given in ED.  Patient was placed on Levophed.  Initially started on cefepime and Flagyl empirically.  Patient was found to have Group A strep bacteremia unclear source. ID was consulted.  Abdominal CT and Chest Xray unremarkable.  UA mild UTI, culture negative to date.  Suspected left elbow as infection source.    On 11/6 daptomycin was added for Enterococcus coverage.  Started on hydrocortisone  (stress dose) for possible adrenal insufficiency.  Repeat echo revealed a EF of 25 to 30%, decreased from Echo in 2021 with EF of 55%, no signs of endocarditis.    On 11/7 cefepime, flagyl, & daptomycin discontinued. Started IV Ancef 2 g every 8 hours.  Repeat blood cultures obtained, negative to date.  Titrated down on Levophed.        Interval Problem Update:  On 11/8 Levophed discontinued.  Hydrocortisone tapered 50 mg IV every 12 hours.  MRI of left elbow ordered and pending, considering septic arthritis.  Pending results will consider orthopedic surgery consult.        REVIEW OF SYSTEMS:   Ten systems reviewed and were negative except as noted in the HPI.       OBJECTIVE:  Vitals:    11/08/22 0830 11/08/22 0900 11/08/22 1000 11/08/22 1100   BP: 125/69   135/72   Pulse: 98 93 97 89   Resp: 16 (!) 25 20 20   Temp: 36.2 °C  (97.1 °F)      TempSrc: Temporal      SpO2: 95% 96% 94% 97%   Weight:       Height:           Intake/Output Summary (Last 24 hours) at 11/8/2022 1128  Last data filed at 11/8/2022 0600  Gross per 24 hour   Intake 1048.58 ml   Output 1825 ml   Net -776.42 ml       PHYSICAL EXAM:  General: No acute distress, afebrile, resting comfortably in chair, conversational   HEENT: PERRLA. EOMI.   Cardiovascular: In Afib, rate 92 without murmurs, rubs, heaves. Normal capillary refill   Respiratory: B/L Lower lobe crackes (mild). On 0.5L 96% saturation. No tachypnea or retractions.   Abdomen: normal bowel sounds, soft, nontender, nondistended, no masses, no organomegaly   EXT:  2/3+ Edema Lower Extremities B/L. Erythema and edema of left elbow joint extending into mid-forearm. Significant tenderness to palpation of left elbow.   Skin: Erythema Surrounding Left Elbow into forearm.   Neuro: Non-focal, alert and orientated       LABS:  Recent Labs     11/06/22  0502 11/07/22  0605 11/08/22  0520   WBC 14.6* 15.1* 13.0*   RBC 3.97* 3.85* 3.84*   HEMOGLOBIN 11.9* 11.5* 11.3*   HEMATOCRIT 35.3* 33.6* 33.1*   MCV 88.9 87.3 86.2   MCH 30.0 29.9 29.4   RDW 48.3 47.5 46.5   PLATELETCT 101* 121* 137*   MPV 12.1 12.3 12.2   NEUTSPOLYS  --  98.20* 88.10*   LYMPHOCYTES  --  0.90* 5.80*   MONOCYTES  --  0.90 3.80   EOSINOPHILS  --  0.00 0.00   BASOPHILS  --  0.00 0.20   RBCMORPHOLO  --  Present  --      Recent Labs     11/06/22  0502 11/06/22  1230 11/07/22  0605 11/07/22  0934 11/07/22  1723 11/07/22  2340 11/08/22  0520   SODIUM 126*   < > 133*   < > 135 133* 135   POTASSIUM 3.6   < > 3.0*   < > 3.2* 3.4* 3.5*   CHLORIDE 96   < > 104   < > 106 104 107   CO2 14*   < > 16*   < > 17* 17* 18*   BUN 71*   < > 70*   < > 65* 65* 63*   CREATININE 3.04*   < > 2.04*   < > 1.75* 1.59* 1.44*   CALCIUM 8.6   < > 8.6   < > 8.7 8.7 8.9   MAGNESIUM 1.8  --  2.3  --   --   --  2.1   PHOSPHORUS  --   --  2.9  --   --   --  1.9*   ALBUMIN 2.6*  --  2.4*  --    --   --  2.4*    < > = values in this interval not displayed.     Estimated GFR/CRCL = Estimated Creatinine Clearance: 79.7 mL/min (A) (by C-G formula based on SCr of 1.44 mg/dL (H)).  Recent Labs     11/07/22  1723 11/07/22  2340 11/08/22  0520   GLUCOSE 182* 201* 191*     Recent Labs     11/06/22  0502 11/07/22  0605 11/08/22  0520   ASTSGOT 97* 87* 127*   ALTSGPT 56* 59* 82*   TBILIRUBIN 3.3* 2.5* 3.0*   ALKPHOSPHAT 114* 147* 152*   GLOBULIN 3.9* 3.8* 3.7*     Recent Labs     11/06/22  1230   CPKTOTAL 596*         No results for input(s): INR, APTT, FIBRINOGEN in the last 72 hours.    Invalid input(s): DIMER    MICROBIOLOGY:   Blood Culture   Date Value Ref Range Status   03/15/2018 No growth after 5 days of incubation.  Final        IMAGING:   DX-CHEST-PORTABLE (1 VIEW)   Final Result      1.  Stable cardiomegaly and interstitial edema.   2.  Stable linear atelectasis in the right lower lung.         US-EXTREMITY NON VASCULAR UNILATERAL LEFT   Final Result      Soft tissue edema in the area of concern about the left elbow without evidence of fluid collection/abscess.      EC-ECHOCARDIOGRAM COMPLETE W/ CONT   Final Result      DX-ELBOW-COMPLETE 3+ LEFT   Final Result      Normal elbow series.      CT-ABDOMEN-PELVIS W/O   Final Result      1.  No evidence of bowel obstruction or focal inflammatory change.      2.  Gallstones.      3.  Mildly distended colon which also contains fluid. No evidence of bowel obstruction.      DX-CHEST-PORTABLE (1 VIEW)   Final Result      1.  Cardiomegaly. There is also interstitial edema.      2.  Right IJ central line present without evidence of complication.      3.  Linear atelectasis within the right lung base.      DX-CHEST-PORTABLE (1 VIEW)   Final Result      1.  Hazy opacity at the right lower lung, similar to 5/25/2021 exam. Scarring versus pneumonia.      MR-ELBOW-W/O LEFT    (Results Pending)       CULTURES:   Results       Procedure Component Value Units Date/Time     "BLOOD CULTURE [[id number]] Collected: 11/07/22 1102    Order Status: Completed Specimen: Blood from Peripheral Updated: 11/08/22 0731     Significant Indicator NEG     Source BLD     Site PERIPHERAL     Culture Result No Growth  Note: Blood cultures are incubated for 5 days and  are monitored continuously.Positive blood cultures  are called to the RN and reported as soon as  they are identified.      Narrative:      Per Hospital Policy: Only change Specimen Src: to \"Line\" if  specified by physician order.  Per Hospital Policy: Only change Specimen Src: to \"Line\" if    BLOOD CULTURE [[id number]] Collected: 11/07/22 1053    Order Status: Completed Specimen: Blood from Peripheral Updated: 11/08/22 0731     Significant Indicator NEG     Source BLD     Site PERIPHERAL     Culture Result No Growth  Note: Blood cultures are incubated for 5 days and  are monitored continuously.Positive blood cultures  are called to the RN and reported as soon as  they are identified.      Narrative:      Per Hospital Policy: Only change Specimen Src: to \"Line\" if  specified by physician order.  No site indicated    Blood Culture [[id number]]  (Abnormal)  (Susceptibility) Collected: 11/05/22 0414    Order Status: Completed Specimen: Blood from Peripheral Updated: 11/07/22 1114     Significant Indicator POS     Source BLD     Site PERIPHERAL     Culture Result Growth detected by Bactec instrument. 11/05/2022  15:42      Streptococcus pyogenes (Group A)  This isolate does NOT demonstrate inducible Clindamycin  resistance in vitro.      Narrative:      CALL  [place]  161 tel. [phone],  CALLED  161 tel. [phone] 11/06/2022, 12:01, RB PERF. RESULTS CALLED TO:  [phone]  1 of 2 for Blood Culture x 2 sites order. Per Hospital  Policy: Only change Specimen Src: to \"Line\" if specified by  physician order.  Left Hand    Susceptibility       Streptococcus pyogenes (group a) (1)       Antibiotic Interpretation Method Status    Penicillin Sensitive E-TEST " "Final    Cefotaxime Sensitive E-TEST Final    Clindamycin Sensitive E-TEST Final                       Blood Culture [[id number]]  (Abnormal) Collected: 11/05/22 0415    Order Status: Completed Specimen: Blood from Peripheral Updated: 11/07/22 1102     Significant Indicator POS     Source BLD     Site PERIPHERAL     Culture Result Growth detected by Bactec instrument. 11/05/2022  14:27      Streptococcus pyogenes (Group A)  See previous culture for sensitivity report.      Narrative:      CALL  [place]  161 tel. [phone],  CALLED  161 tel. [phone] 11/05/2022, 14:52, RB PERF. RESULTS CALLED  TO:[phone]  2 of 2 blood culture x2  Sites order. Per Hospital Policy:  Only change Specimen Src: to \"Line\" if specified by physician  order.  Right Wrist    E-Test [[id number]] Collected: 11/05/22 0414    Order Status: Completed Specimen: Other Updated: 11/07/22 1100     ETEST Sensitivity FINAL    Narrative:      161 tel. [phone] 11/06/2022, 12:01, RB PERF. RESULTS CALLED TO: [phone]  1 of 2 for Blood Culture x 2 sites order. Per Hospital  Policy: Only change Specimen Src: to \"Line\" if specified by  physician order.    Urine Culture (NEW) [[id number]] Collected: 11/05/22 0500    Order Status: Completed Specimen: Urine Updated: 11/07/22 0950     Significant Indicator NEG     Source UR     Site -     Culture Result No growth at 48 hours.    Narrative:      Indication for culture:->Evaluation for sepsis without a  clear source of infection  Indication for culture:->Evaluation for sepsis without a    EHEC(Shiga Toxin)Detection [[id number]] Collected: 11/05/22 0450    Order Status: Completed Specimen: Stool Updated: 11/06/22 1531     Significant Indicator NEG     Source STL     Site STOOL     EHEC Negative for Shiga Toxin 1 and 2.    Narrative:      For adult patients only; culture includes screen for  Campylobacter.  For adult patients only; culture includes screen for    CULTURE STOOL [[id number]] Collected: 11/05/22 0450    Order " "Status: Completed Specimen: Stool Updated: 11/06/22 1529     Significant Indicator NEG     Source STL     Site STOOL     Culture Result Culture in progress.    NOTE:    Stool cultures are screened for Shiga Toxins 1 and 2,    Salmonella, Shigella, Campylobacter, Aeromonas,    Plesiomonas, and Vibrio.       EHEC Negative for Shiga Toxin 1 and 2.     Campylobactor Antigen --     Negative for Campylobacter Antigen.  Test results are to be used in conjunction with information  available from the patient clinical evaluation and other  diagnostic procedures.      Narrative:      For adult patients only; culture includes screen for  Campylobacter.  For adult patients only; culture includes screen for    MRSA By PCR (Amp) [[id number]] Collected: 11/05/22 1156    Order Status: Completed Specimen: Respirate from Nares Updated: 11/05/22 1433     MRSA by PCR POSITIVE    Narrative:      Collected By: [id number] AUGUSTIN BALL    C Diff by PCR rflx Toxin [[id number]] Collected: 11/05/22 0450    Order Status: Completed Specimen: Stool Updated: 11/05/22 0804     C Diff by PCR Negative     Comment: C. difficile NOT detected by PCR.  Treatment not indicated per guidelines.  Repeat testing not indicated within 7 days.          027-NAP1-BI Presumptive Negative     Comment: Presumptive 027/NAP1/BI target DNA sequences are NOT DETECTED.       Narrative:      Does this patient have risk factors for C-diff?->Yes  C-Diff Risk Factors->immunocompromising conditions    CoV-2, FLU A/B, and RSV by PCR (2-4 Hours CEPHEID) : Collect NP swab in VTM [[id number]] Collected: 11/05/22 0507    Order Status: Completed Specimen: Respirate Updated: 11/05/22 0606     Influenza virus A RNA Negative     Influenza virus B, PCR Negative     RSV, PCR Negative     SARS-CoV-2 by PCR NotDetected     Comment: PATIENTS: Important information regarding your results and instructions can  be found at [email or web address]   \"After " "your  Covid-19 Test\"    [facility] providers: PLEASE REFER TO DE-ESCALATION AND RETESTING PROTOCOL  on [email or web address]    **The [employer] GeneXpert Xpress SARS-CoV-2 RT-PCR Test has been made  available for use under the Emergency Use Authorization (EUA) only.          SARS-CoV-2 Source NP Swab    Urinalysis [[id number]]  (Abnormal) Collected: 11/05/22 0500    Order Status: Completed Specimen: Urine Updated: 11/05/22 0555     Color DK Yellow     Character Turbid     Specific Gravity 1.017     Ph 5.0     Glucose 100 mg/dL      Ketones Trace mg/dL      Protein 30 mg/dL      Bilirubin Small     Urobilinogen, Urine 1.0     Nitrite Positive     Leukocyte Esterase Small     Occult Blood Negative     Micro Urine Req Microscopic    Narrative:      Indication for culture:->Evaluation for sepsis without a  clear source of infection            MEDS:  Current Facility-Administered Medications   Medication Last Admin    hydrocortisone sodium succinate PF (Solu-CORTEF) 100 MG injection 50 mg      potassium chloride SA (Kdur) tablet 20 mEq 20 mEq at 11/08/22 1124    sodium phosphate 30 mmol in dextrose 5% 500 mL ivpb      amiodarone (Cordarone) tablet 400 mg 400 mg at 11/08/22 0525    Followed by    [START ON 11/12/2022] amiodarone (Cordarone) tablet 400 mg      Followed by    [START ON 11/17/2022] amiodarone (Cordarone) tablet 200 mg      ceFAZolin (Ancef) 2 g in  mL IVPB Stopped at 11/08/22 0558    insulin regular (HumuLIN R,NovoLIN R) injection 2 Units at 11/08/22 0638    And    dextrose 10 % BOLUS 25 g      acetaminophen (Tylenol) tablet 650 mg 650 mg at 11/08/22 1124    levothyroxine (SYNTHROID) tablet 150 mcg 150 mcg at 11/08/22 0526    apixaban (ELIQUIS) tablet 5 mg 5 mg at 11/08/22 0526    traZODone (DESYREL) tablet 50 mg 50 mg at 11/07/22 2129    senna-docusate (PERICOLACE or SENOKOT S) 8.6-50 MG per tablet 2 Tablet      And    polyethylene glycol/lytes (MIRALAX) PACKET 1 Packet      And    magnesium hydroxide (MILK OF " MAGNESIA) suspension 30 mL      And    bisacodyl (DULCOLAX) suppository 10 mg      nystatin (MYCOSTATIN) powder Given at 11/08/22 0532       PROBLEM LIST:  No problems updated.    Consults:  Hospital Team     ASSESSMENT: 5  57-year-old male with past medical history of nonischemic cardiomyopathy, permanent AF on amiodarone and Eliquis, insulin-dependent diabetes type 2, and perforated bowel who presented to the ED on 11/5/2022 with septic shock and secondary SRUTHI.  Fluid administration per sepsis protocol given.  Patient was placed on Levophed titrated off on 11/8. Was started on Hydrocortisone (stress dose) and titrated down as appropriate as well. Patient was found to have strep bacteremia unclear source, suspected to be Left Elbow. ID consulted and Started on Empiric IV antibiotics.     Plan:  Swelling of left elbow joint  Assessment & Plan  Edema and Erythema of Left Elbow Joint - Native Joint  Significant Tenderness to palpation.   MRI pending, Consider Septic Arthiritis, will consult ortho pending results.      Acute on chronic heart failure (HCC)  Assessment & Plan  Hx of Nonischemic Cardiomyopathy  Lower Extremity Edema   BNP > 33,000  Echo from 2021 with EF of 55%  Repeat echo this admission revealed a EF of 25 to 30%    Streptococcal bacteremia  Assessment & Plan  Blood cultures positive for streptococcus, source unclear  Recent TTE negative for endocarditis  ID consulted and following          Continue IV Ancef 2g q 8 hrs   Pending L elbow MRI - consider possible septic joint. Consider orthopedic surgery pending MRI results     Gastroenteritis  Assessment & Plan  Started 4 days prior to Admission  CT Abdomen unremarkable.  Improving slightly.  Will continue to monitor  Antidiarrheals prn      Bacteremia due to Gram-positive bacteria  Assessment & Plan  Group A streptococcus Bacteremia, source unclear   Was in Septic Shock  Initially given Fluid administration per sepsis protocol.   Started on Pressor  Levophed on 11/5 -> D/Cd on 11/8.    Started on Hydrocortisone 11/6 - Stress dose  Abdominal CT and Chest Xray unremarkable.  TTE without evidence of endocarditis  UA mild UTI, culture negative to date.  Suspected left elbow as infection source.    ID consulted, pt started on cefepime and flagyl empirically.     On 11/6 daptomycin was added for Enterococcus coverage.    On 11/7 cefepime, flagyl, & daptomycin D/Cd. Started IV Ancef 2 g every 8 hours. Rpt Blood Cx obtained.     - Taper hydrocortisone, 50 mg IV every 12 hours  - Continue IV Ancef 2 g q8 hrs  - Continue to Follow Rpt Blood Cxs, negative to date.   - MRI left elbow pending - suspecting Septic Arthritis. Will consult Ortho, pending results.     Permanent atrial fibrillation (HCC)  Assessment & Plan  Currently in atrial fibrillation with RVR.   Rate controlled              Continue apixaban, 5 mg twice daily              Continue amiodarone  Will restart metoprolol as BP is stable.     Hypothyroidism  Assessment & Plan  Continue Synthroid    Acute kidney injury (HCC)  Assessment & Plan  Initially Creatinine of 3.13 and GFR of 22  Improving with Creat of 1.44 and GFR of 56 today     -Daily BMP to monitor   -Strict Is/Os   -Avoid Nephrotoxins and Renally Dose Meds       Obesity due to excess calories with serious comorbidity  Assessment & Plan  Will need counseling when clinically appropriate    Hypokalemia  Assessment & Plan  From diarrhea  Replace      * Severe sepsis with septic shock (HCC)  Assessment & Plan  Continue intravenous antimicrobial therapy  Shock has resolved - he is off vasopressor (levophed).  Sepsis is improving  Taper hydrocortisone, 50 mg IV every 12 hours       Lines: Peripheral, Central Line in place.  Abx: Ancef   DVT prophylaxis: Apixiban 5 mg BID  Code Status: Full  Tubes: Zabala   Diet: Cardiac     Disposition:  Patient is now off of vasopressor, shock has resolved, will be downgraded to IMCU status.       Mariposa Galindo MD   PGY-3  Family Medicine Resident   [facility]

## 2022-11-08 NOTE — CARE PLAN
The patient is Stable - Low risk of patient condition declining or worsening    Shift Goals  Clinical Goals: MAP >65, K scale  Patient Goals: Sleep, comfort  Family Goals: VON    Progress made toward(s) clinical / shift goals:        Problem: Hemodynamics  Goal: Patient's hemodynamics, fluid balance and neurologic status will be stable or improve  Outcome: Progressing     Problem: Pain - Standard  Goal: Alleviation of pain or a reduction in pain to the patient’s comfort goal  Outcome: Progressing       Patient is not progressing towards the following goals:      Problem: Skin Integrity  Goal: Skin integrity is maintained or improved  Outcome: Not Progressing

## 2022-11-08 NOTE — WOUND TEAM
Wound consult placed regarding bilateral medial thighs. Wound evaluated this area on 11/6/22. Etiology unclear but does not appear to be pressure related as there was no pressure overlying the area. Mepilex can be applied to prevent breakdown.

## 2022-11-09 LAB
ALBUMIN SERPL BCP-MCNC: 2.6 G/DL (ref 3.2–4.9)
ALBUMIN/GLOB SERPL: 0.7 G/DL
ALP SERPL-CCNC: 158 U/L (ref 30–99)
ALT SERPL-CCNC: 89 U/L (ref 2–50)
ANION GAP SERPL CALC-SCNC: 10 MMOL/L (ref 7–16)
AST SERPL-CCNC: 160 U/L (ref 12–45)
BASOPHILS # BLD AUTO: 0.7 % (ref 0–1.8)
BASOPHILS # BLD: 0.1 K/UL (ref 0–0.12)
BILIRUB SERPL-MCNC: 2.7 MG/DL (ref 0.1–1.5)
BUN SERPL-MCNC: 59 MG/DL (ref 8–22)
CALCIUM SERPL-MCNC: 8.8 MG/DL (ref 8.5–10.5)
CHLORIDE SERPL-SCNC: 106 MMOL/L (ref 96–112)
CO2 SERPL-SCNC: 16 MMOL/L (ref 20–33)
CREAT SERPL-MCNC: 1.26 MG/DL (ref 0.5–1.4)
EOSINOPHIL # BLD AUTO: 0.02 K/UL (ref 0–0.51)
EOSINOPHIL NFR BLD: 0.1 % (ref 0–6.9)
ERYTHROCYTE [DISTWIDTH] IN BLOOD BY AUTOMATED COUNT: 49.2 FL (ref 35.9–50)
GFR SERPLBLD CREATININE-BSD FMLA CKD-EPI: 65 ML/MIN/1.73 M 2
GLOBULIN SER CALC-MCNC: 3.5 G/DL (ref 1.9–3.5)
GLUCOSE BLD STRIP.AUTO-MCNC: 136 MG/DL (ref 65–99)
GLUCOSE BLD STRIP.AUTO-MCNC: 148 MG/DL (ref 65–99)
GLUCOSE BLD STRIP.AUTO-MCNC: 154 MG/DL (ref 65–99)
GLUCOSE BLD STRIP.AUTO-MCNC: 165 MG/DL (ref 65–99)
GLUCOSE SERPL-MCNC: 185 MG/DL (ref 65–99)
HCT VFR BLD AUTO: 34.3 % (ref 42–52)
HGB BLD-MCNC: 11.5 G/DL (ref 14–18)
IMM GRANULOCYTES # BLD AUTO: 0.67 K/UL (ref 0–0.11)
IMM GRANULOCYTES NFR BLD AUTO: 5 % (ref 0–0.9)
LYMPHOCYTES # BLD AUTO: 1.11 K/UL (ref 1–4.8)
LYMPHOCYTES NFR BLD: 8.3 % (ref 22–41)
MAGNESIUM SERPL-MCNC: 1.8 MG/DL (ref 1.5–2.5)
MCH RBC QN AUTO: 29.6 PG (ref 27–33)
MCHC RBC AUTO-ENTMCNC: 33.5 G/DL (ref 33.7–35.3)
MCV RBC AUTO: 88.4 FL (ref 81.4–97.8)
MONOCYTES # BLD AUTO: 0.59 K/UL (ref 0–0.85)
MONOCYTES NFR BLD AUTO: 4.4 % (ref 0–13.4)
NEUTROPHILS # BLD AUTO: 10.88 K/UL (ref 1.82–7.42)
NEUTROPHILS NFR BLD: 81.5 % (ref 44–72)
NRBC # BLD AUTO: 0 K/UL
NRBC BLD-RTO: 0 /100 WBC
PHOSPHATE SERPL-MCNC: 2.2 MG/DL (ref 2.5–4.5)
PLATELET # BLD AUTO: 160 K/UL (ref 164–446)
PMV BLD AUTO: 11.7 FL (ref 9–12.9)
POTASSIUM SERPL-SCNC: 3.8 MMOL/L (ref 3.6–5.5)
PROT SERPL-MCNC: 6.1 G/DL (ref 6–8.2)
RBC # BLD AUTO: 3.88 M/UL (ref 4.7–6.1)
SODIUM SERPL-SCNC: 132 MMOL/L (ref 135–145)
WBC # BLD AUTO: 13.4 K/UL (ref 4.8–10.8)

## 2022-11-09 PROCEDURE — 99232 SBSQ HOSP IP/OBS MODERATE 35: CPT | Performed by: STUDENT IN AN ORGANIZED HEALTH CARE EDUCATION/TRAINING PROGRAM

## 2022-11-09 PROCEDURE — 36415 COLL VENOUS BLD VENIPUNCTURE: CPT

## 2022-11-09 PROCEDURE — 770020 HCHG ROOM/CARE - TELE (206)

## 2022-11-09 PROCEDURE — A9270 NON-COVERED ITEM OR SERVICE: HCPCS | Performed by: INTERNAL MEDICINE

## 2022-11-09 PROCEDURE — 82962 GLUCOSE BLOOD TEST: CPT | Mod: 91

## 2022-11-09 PROCEDURE — 99232 SBSQ HOSP IP/OBS MODERATE 35: CPT | Performed by: INTERNAL MEDICINE

## 2022-11-09 PROCEDURE — 85025 COMPLETE CBC W/AUTO DIFF WBC: CPT

## 2022-11-09 PROCEDURE — 700111 HCHG RX REV CODE 636 W/ 250 OVERRIDE (IP): Performed by: INTERNAL MEDICINE

## 2022-11-09 PROCEDURE — 83735 ASSAY OF MAGNESIUM: CPT

## 2022-11-09 PROCEDURE — A9270 NON-COVERED ITEM OR SERVICE: HCPCS

## 2022-11-09 PROCEDURE — 700102 HCHG RX REV CODE 250 W/ 637 OVERRIDE(OP): Performed by: STUDENT IN AN ORGANIZED HEALTH CARE EDUCATION/TRAINING PROGRAM

## 2022-11-09 PROCEDURE — A9270 NON-COVERED ITEM OR SERVICE: HCPCS | Performed by: STUDENT IN AN ORGANIZED HEALTH CARE EDUCATION/TRAINING PROGRAM

## 2022-11-09 PROCEDURE — 700102 HCHG RX REV CODE 250 W/ 637 OVERRIDE(OP)

## 2022-11-09 PROCEDURE — 700102 HCHG RX REV CODE 250 W/ 637 OVERRIDE(OP): Performed by: INTERNAL MEDICINE

## 2022-11-09 PROCEDURE — 84100 ASSAY OF PHOSPHORUS: CPT

## 2022-11-09 PROCEDURE — 700105 HCHG RX REV CODE 258: Performed by: INTERNAL MEDICINE

## 2022-11-09 PROCEDURE — 80053 COMPREHEN METABOLIC PANEL: CPT

## 2022-11-09 RX ADMIN — POTASSIUM CHLORIDE 20 MEQ: 1500 TABLET, EXTENDED RELEASE ORAL at 16:48

## 2022-11-09 RX ADMIN — AMIODARONE HYDROCHLORIDE 400 MG: 200 TABLET ORAL at 06:15

## 2022-11-09 RX ADMIN — SENNOSIDES AND DOCUSATE SODIUM 2 TABLET: 50; 8.6 TABLET ORAL at 16:48

## 2022-11-09 RX ADMIN — LEVOTHYROXINE SODIUM 150 MCG: 0.07 TABLET ORAL at 06:15

## 2022-11-09 RX ADMIN — NYSTATIN: 100000 POWDER TOPICAL at 16:49

## 2022-11-09 RX ADMIN — POTASSIUM CHLORIDE 20 MEQ: 1500 TABLET, EXTENDED RELEASE ORAL at 06:15

## 2022-11-09 RX ADMIN — SENNOSIDES AND DOCUSATE SODIUM 2 TABLET: 50; 8.6 TABLET ORAL at 06:16

## 2022-11-09 RX ADMIN — APIXABAN 5 MG: 5 TABLET, FILM COATED ORAL at 16:48

## 2022-11-09 RX ADMIN — CEFAZOLIN 2 G: 2 INJECTION, POWDER, FOR SOLUTION INTRAMUSCULAR; INTRAVENOUS at 13:35

## 2022-11-09 RX ADMIN — INSULIN HUMAN 2 UNITS: 100 INJECTION, SOLUTION PARENTERAL at 08:23

## 2022-11-09 RX ADMIN — POTASSIUM CHLORIDE 20 MEQ: 1500 TABLET, EXTENDED RELEASE ORAL at 11:29

## 2022-11-09 RX ADMIN — APIXABAN 5 MG: 5 TABLET, FILM COATED ORAL at 06:16

## 2022-11-09 RX ADMIN — CEFAZOLIN 2 G: 2 INJECTION, POWDER, FOR SOLUTION INTRAMUSCULAR; INTRAVENOUS at 06:22

## 2022-11-09 RX ADMIN — INSULIN HUMAN 2 UNITS: 100 INJECTION, SOLUTION PARENTERAL at 11:30

## 2022-11-09 RX ADMIN — HYDROCODONE BITARTRATE AND ACETAMINOPHEN 2 TABLET: 5; 325 TABLET ORAL at 21:09

## 2022-11-09 RX ADMIN — TRAZODONE HYDROCHLORIDE 50 MG: 50 TABLET ORAL at 21:09

## 2022-11-09 RX ADMIN — CEFAZOLIN 2 G: 2 INJECTION, POWDER, FOR SOLUTION INTRAMUSCULAR; INTRAVENOUS at 21:13

## 2022-11-09 RX ADMIN — NYSTATIN: 100000 POWDER TOPICAL at 08:20

## 2022-11-09 RX ADMIN — AMIODARONE HYDROCHLORIDE 400 MG: 200 TABLET ORAL at 16:48

## 2022-11-09 RX ADMIN — HYDROCORTISONE SODIUM SUCCINATE 50 MG: 100 INJECTION, POWDER, FOR SOLUTION INTRAMUSCULAR; INTRAVENOUS at 06:16

## 2022-11-09 ASSESSMENT — ENCOUNTER SYMPTOMS
HEADACHES: 0
EYE PAIN: 0
SHORTNESS OF BREATH: 0
FOCAL WEAKNESS: 0
DIARRHEA: 0
BLURRED VISION: 0
ABDOMINAL PAIN: 0
DIZZINESS: 0
SENSORY CHANGE: 0
COUGH: 0
PALPITATIONS: 0
VOMITING: 0
NAUSEA: 0
FEVER: 0
BACK PAIN: 0
INSOMNIA: 0
CHILLS: 0

## 2022-11-09 ASSESSMENT — FIBROSIS 4 INDEX: FIB4 SCORE: 6.25

## 2022-11-09 ASSESSMENT — LIFESTYLE VARIABLES: SUBSTANCE_ABUSE: 0

## 2022-11-09 ASSESSMENT — PAIN DESCRIPTION - PAIN TYPE
TYPE: ACUTE PAIN

## 2022-11-09 NOTE — CARE PLAN
The patient is Stable - Low risk of patient condition declining or worsening    Shift Goals  Clinical Goals: mobilize, transfer orders,MRI  Patient Goals: pain control  Family Goals: VON    Progress made toward(s) clinical / shift goals:      Tele orders in place. Got up to recliner chair today with FWW and 2-3 assist. A/Ox4 and calls appropriately to make needs known. PO [place] given for complaints of left elbow pain. MRI orders modified. Afib on monitor and rate controlled.     Problem: Knowledge Deficit - Standard  Goal: Patient and family/care givers will demonstrate understanding of plan of care, disease process/condition, diagnostic tests and medications  11/8/2022 1841 by Nallely Burns R.N.  Outcome: Progressing  11/8/2022 1841 by Nallely Burns R.N.  Outcome: Progressing     Problem: Fluid Volume  Goal: Fluid volume balance will be maintained  11/8/2022 1841 by Nallely Burns R.N.  Outcome: Progressing  11/8/2022 1841 by Nallely Burns R.N.  Outcome: Progressing     Problem: Urinary - Renal Perfusion  Goal: Ability to achieve and maintain adequate renal perfusion and functioning will improve  11/8/2022 1841 by Nallely Burns R.N.  Outcome: Progressing  11/8/2022 1841 by Nallely Burns R.N.  Outcome: Progressing     Problem: Respiratory  Goal: Patient will achieve/maintain optimum respiratory ventilation and gas exchange  11/8/2022 1841 by Nallely Burns R.N.  Outcome: Progressing  11/8/2022 1841 by Nallely Burns R.N.  Outcome: Progressing     Problem: Physical Regulation  Goal: Diagnostic test results will improve  11/8/2022 1841 by Nallely Burns R.N.  Outcome: Progressing  11/8/2022 1841 by Nallely Burns R.N.  Outcome: Progressing  Goal: Signs and symptoms of infection will decrease  11/8/2022 1841 by Nallely Burns R.N.  Outcome: Progressing  11/8/2022 1841 by Nallely Burns R.N.  Outcome: Progressing     Problem: Skin  Integrity  Goal: Skin integrity is maintained or improved  11/8/2022 1841 by Nallely Burns R.N.  Outcome: Progressing  11/8/2022 1841 by Nallely Burns R.N.  Outcome: Progressing     Problem: Fall Risk  Goal: Patient will remain free from falls  11/8/2022 1841 by Nallely Burns R.N.  Outcome: Progressing  11/8/2022 1841 by Nallely Burns R.N.  Outcome: Progressing     Problem: Pain - Standard  Goal: Alleviation of pain or a reduction in pain to the patient’s comfort goal  11/8/2022 1841 by Nallely Burns R.N.  Outcome: Progressing  11/8/2022 1841 by Nallely Burns R.N.  Outcome: Progressing

## 2022-11-09 NOTE — CARE PLAN
The patient is Stable - Low risk of patient condition declining or worsening    Shift Goals  Clinical Goals: monitor VS, atbx, rest  Patient Goals: rest  Family Goals: n/a    Progress made toward(s) clinical / shift goals:      Problem: Knowledge Deficit - Standard  Goal: Patient and family/care givers will demonstrate understanding of plan of care, disease process/condition, diagnostic tests and medications  Outcome: Progressing     Problem: Fluid Volume  Goal: Fluid volume balance will be maintained  Outcome: Progressing     Problem: Respiratory  Goal: Patient will achieve/maintain optimum respiratory ventilation and gas exchange  Outcome: Progressing     Problem: Physical Regulation  Goal: Diagnostic test results will improve  Outcome: Progressing     Problem: Skin Integrity  Goal: Skin integrity is maintained or improved  Outcome: Progressing     Problem: Fall Risk  Goal: Patient will remain free from falls  Outcome: Progressing     Problem: Pain - Standard  Goal: Alleviation of pain or a reduction in pain to the patient’s comfort goal  Outcome: Progressing       Patient is not progressing towards the following goals:

## 2022-11-09 NOTE — PROGRESS NOTES
Superficial infectious Disease Progress Note    Author: Rai Rodriguez M.D. Date & Time of service: 2022  9:29 AM    Chief Complaint:  Follow-up for bacteremia    Interval History:   T-max 102.6 on , white count up to 15.1 today, on cefepime and Flagyl.  Continues to have left elbow pain   patient afebrile since antibiotic initiation, white count of 11,000, tolerated switch to Ancef.   patient remains afebrile, white count is 13.4, tolerating Ancef.  MRI remains pending.  Elbow pain much better    Labs Reviewed and Medications Reviewed.    Review of Systems:  Review of Systems   Constitutional:  Negative for chills and fever.   Gastrointestinal:  Negative for abdominal pain, diarrhea, nausea and vomiting.   Musculoskeletal:  Positive for joint pain.   Skin:  Negative for itching and rash.   All other systems reviewed and are negative.    Hemodynamics:  Temp (24hrs), Av.6 °C (97.8 °F), Min:36.3 °C (97.3 °F), Max:36.9 °C (98.4 °F)  Temperature: 36.5 °C (97.7 °F), Monitored Temp: 36.3 °C (97.3 °F)  Pulse  Av.3  Min: 76  Max: 140   Blood Pressure: (!) 140/88       Physical Exam:  Physical Exam  Vitals and nursing note reviewed.   Constitutional:       General: He is not in acute distress.     Appearance: He is ill-appearing.      Comments: Chronically ill-appearing   HENT:      Mouth/Throat:      Comments: Poor dentition  Eyes:      General: No scleral icterus.        Right eye: No discharge.         Left eye: No discharge.      Conjunctiva/sclera: Conjunctivae normal.   Cardiovascular:      Rate and Rhythm: Normal rate and regular rhythm.      Heart sounds: No murmur heard.  Pulmonary:      Effort: Pulmonary effort is normal. No respiratory distress.      Breath sounds: No stridor.   Abdominal:      General: Abdomen is flat. There is no distension.      Tenderness: There is no abdominal tenderness.   Musculoskeletal:         General: Swelling and tenderness present.      Comments: Left  posterior elbow swelling with erythema significantly improved, pain with passive motion also significantly improved   Skin:     Findings: Erythema present.   Neurological:      General: No focal deficit present.      Mental Status: He is alert and oriented to person, place, and time.   Psychiatric:      Comments: Flat affect       Meds:    Current Facility-Administered Medications:     [START ON 11/10/2022] hydrocortisone sodium succinate PF    potassium chloride SA    HYDROcodone-acetaminophen    amiodarone **FOLLOWED BY** [START ON 11/12/2022] amiodarone **FOLLOWED BY** [START ON 11/17/2022] amiodarone    ceFAZolin    insulin regular **AND** POC blood glucose manual result **AND** NOTIFY MD and PharmD **AND** Administer 20 grams of glucose (approximately 8 ounces of fruit juice) every 15 minutes PRN FSBG less than 70 mg/dL **AND** dextrose bolus    acetaminophen    levothyroxine    apixaban    traZODone    senna-docusate **AND** polyethylene glycol/lytes **AND** magnesium hydroxide **AND** bisacodyl    nystatin    Labs:  Recent Labs     11/07/22  0605 11/08/22  0520 11/09/22  0344   WBC 15.1* 13.0* 13.4*   RBC 3.85* 3.84* 3.88*   HEMOGLOBIN 11.5* 11.3* 11.5*   HEMATOCRIT 33.6* 33.1* 34.3*   MCV 87.3 86.2 88.4   MCH 29.9 29.4 29.6   RDW 47.5 46.5 49.2   PLATELETCT 121* 137* 160*   MPV 12.3 12.2 11.7   NEUTSPOLYS 98.20* 88.10* 81.50*   LYMPHOCYTES 0.90* 5.80* 8.30*   MONOCYTES 0.90 3.80 4.40   EOSINOPHILS 0.00 0.00 0.10   BASOPHILS 0.00 0.20 0.70   RBCMORPHOLO Present  --   --        Recent Labs     11/06/22  1230 11/06/22  1700 11/07/22  2340 11/08/22  0520 11/09/22  0344   SODIUM 129*   < > 133* 135 132*   POTASSIUM 3.0*   < > 3.4* 3.5* 3.8   CHLORIDE 98   < > 104 107 106   CO2 15*   < > 17* 18* 16*   GLUCOSE 165*   < > 201* 191* 185*   BUN 73*   < > 65* 63* 59*   CPKTOTAL 596*  --   --   --   --     < > = values in this interval not displayed.       Recent Labs     11/07/22  0605 11/07/22  0934 11/07/22  6713  11/08/22  0520 11/09/22  0344   ALBUMIN 2.4*  --   --  2.4* 2.6*   TBILIRUBIN 2.5*  --   --  3.0* 2.7*   ALKPHOSPHAT 147*  --   --  152* 158*   TOTPROTEIN 6.2  --   --  6.1 6.1   ALTSGPT 59*  --   --  82* 89*   ASTSGOT 87*  --   --  127* 160*   CREATININE 2.04*   < > 1.59* 1.44* 1.26    < > = values in this interval not displayed.         Imaging:  CT-ABDOMEN-PELVIS W/O    Result Date: 11/5/2022 11/5/2022 9:05 AM HISTORY/REASON FOR EXAM:  Abdominal distention. TECHNIQUE/EXAM DESCRIPTION: CT scan of the abdomen and pelvis without contrast. Noncontrast helical scanning was obtained from the diaphragmatic domes through the pubic symphysis. Low dose optimization technique was utilized for this CT exam including automated exposure control and adjustment of the mA and/or kV according to patient size. COMPARISON: None. FINDINGS: Lower Chest: There is bibasilar atelectasis.. Liver: Normal. Spleen: Calcified granulomas. Pancreas: Atrophic. Gallbladder: There are multiple gallstones. Biliary: Nondilated. Adrenal glands: Normal. Kidneys: Unremarkable without hydronephrosis. Bowel: The colon is mildly distended and contains fluid.. Lymph nodes: No adenopathy. Vasculature: Unremarkable. Peritoneum: Unremarkable without ascites. Musculoskeletal: There are chronic compression fractures of the spine.. Pelvis: No adenopathy or free fluid.     1.  No evidence of bowel obstruction or focal inflammatory change. 2.  Gallstones. 3.  Mildly distended colon which also contains fluid. No evidence of bowel obstruction.    DX-CHEST-PORTABLE (1 VIEW)    Result Date: 11/7/2022 11/7/2022 12:04 AM HISTORY/REASON FOR EXAM:  sepsis. TECHNIQUE/EXAM DESCRIPTION AND NUMBER OF VIEWS: Single portable view of the chest. COMPARISON:  11/5/2022. FINDINGS: LUNGS: Linear atelectasis in the right lower lung. No focal consolidation. Persistent interstitial edema. No effusions. PNEUMOTHORAX: None. LINES AND TUBES: Stable right IJ central catheter.  MEDIASTINUM: Stable cardiac silhouette. MUSCULOSKELETAL STRUCTURES: Unchanged.     1.  Stable cardiomegaly and interstitial edema. 2.  Stable linear atelectasis in the right lower lung.     DX-CHEST-PORTABLE (1 VIEW)    Result Date: 11/5/2022 11/5/2022 8:17 AM HISTORY/REASON FOR EXAM: Central line placement TECHNIQUE/EXAM DESCRIPTION AND NUMBER OF VIEWS: Single portable view of the chest. COMPARISON: 11/5/2022 FINDINGS: There is a right IJ central line present with the tip overlying the upper right atrium. No evidence of pneumothorax. There is interstitial edema. There is linear atelectasis within the right midlung. There is no pleural effusion. The heart is enlarged.     1.  Cardiomegaly. There is also interstitial edema. 2.  Right IJ central line present without evidence of complication. 3.  Linear atelectasis within the right lung base.    DX-CHEST-PORTABLE (1 VIEW)    Result Date: 11/5/2022 11/5/2022 3:40 AM HISTORY/REASON FOR EXAM:  Sepsis TECHNIQUE/EXAM DESCRIPTION AND NUMBER OF VIEWS: Single portable view of the chest. COMPARISON: 5/25/2021 FINDINGS: HEART: Not enlarged. LUNGS: Hazy opacity right lower lung. PLEURA: No effusion or pneumothorax.     1.  Hazy opacity at the right lower lung, similar to 5/25/2021 exam. Scarring versus pneumonia.    DX-ELBOW-COMPLETE 3+ LEFT    Result Date: 11/5/2022 11/5/2022 11:29 AM HISTORY/REASON FOR EXAM:  Pain/Deformity Following Trauma TECHNIQUE/EXAM DESCRIPTION AND NUMBER OF VIEWS:  3 views of the LEFT elbow. COMPARISON: None FINDINGS: There is no evidence of joint effusion. There is no evidence of displaced fracture or dislocation. There is no focal soft tissue swelling.     Normal elbow series.    US-EXTREMITY NON VASCULAR UNILATERAL LEFT    Result Date: 11/6/2022 11/6/2022 12:21 PM HISTORY/REASON FOR EXAM:  Left elbow swelling, redness, pain TECHNIQUE/EXAM DESCRIPTION AND NUMBER OF VIEWS: Ultrasound extremity nonvascular unilateral COMPARISON: X-ray yesterday  FINDINGS: There is edema. No fluid collection identified.     Soft tissue edema in the area of concern about the left elbow without evidence of fluid collection/abscess.    EC-ECHOCARDIOGRAM COMPLETE W/ CONT    Result Date: 2022  Transthoracic Echo Report Echocardiography Laboratory CONCLUSIONS Severely reduced left ventricular systolic function. The left ventricular ejection fraction is visually estimated to be 25- 30%, but may be underestimated due to tachycardia. Global hypokinesis with more pronounced hypokinesis of the anterior and anterolateral walls. Diastolic function is abnormal, but grade cannot be determined. The right ventricle is not well visualized, but appears dilated with reduced systolic function. Biatrial dilation. Mild to moderate tricuspid regurgitation. Dilated inferior vena cava without inspiratory collapse. SANDRA EMELY Exam Date:         2022                    11:46 Exam Location:     Inpatient Priority:          Routine Ordering Physician:        LESLIE ARMENTA Referring Physician: Sonographer:               Sheron Baxter RDCS Age:    57     Gender:    M MRN:    [id number] :    1965 BSA:    2.49   Ht (in):    72     Wt (lb):    290 Exam Type:     Complete, Contrast Indications:     Heart Failure, Systolic ICD Codes:       428.2 CPT Codes:       16470,  BP:   90     /   52     HR:   120 Technical Quality:       Fair MEASUREMENTS  (Male / Female) Normal Values 2D ECHO LV Diastolic Diameter PLAX        5.1 cm                4.2 - 5.9 / 3.9 - 5.3 cm LV Systolic Diameter PLAX         4.8 cm                2.1 - 4.0 cm IVS Diastolic Thickness           1 cm                  LVPW Diastolic Thickness          0.83 cm               LVOT Diameter                     2.2 cm                Estimated LV Ejection Fraction    30 %                  LV Ejection Fraction MOD BP       39.3 %                >= 55  % LV Ejection Fraction MOD 4C       41.2 %                LV  Ejection Fraction MOD 2C       36.1 %                IVC Diameter                      2.5 cm                DOPPLER AV Peak Velocity                  1.2 m/s               AV Peak Gradient                  6 mmHg                AV Mean Gradient                  3.8 mmHg              LVOT Peak Velocity                0.85 m/s              AV Area Cont Eq vti               2.4 cm2               TV Peak E Velocity                0.51 m/s              TR Peak Velocity                  290 cm/s              * Indicates values subject to auto-interpretation LV EF:  30    % FINDINGS Left Ventricle Normal left ventricular chamber size. Normal left ventricular wall thickness. Severely reduced left ventricular systolic function. The left ventricular ejection fraction is visually estimated to be 25-30%, but may be underestimated due to tachycardia. Global hypokinesis with more pronounced hypokinesis of the anterior and anterolateral walls. Diastolic function is abnormal, but grade cannot be determined. 3 mL of contrast was used to enhance visualization of the endocardial border. Existing IV was used at the left elbow. Right Ventricle The right ventricle is not well visualized, but appears dilated with reduced systolic function. Estimated right ventricular systolic pressure is 50 mmHg Right Atrium The right atrium is not well visualized, but appears dilated. Dilated inferior vena cava without inspiratory collapse. Left Atrium The left atrium is not well visualized, but appears dilated. Mitral Valve The mitral valve is not well visualized. No stenosis or regurgitation seen. Aortic Valve The aortic valve is not well visualized. No stenosis or regurgitation seen. Tricuspid Valve No tricuspid stenosis. Mild to moderate tricuspid regurgitation. Pulmonic Valve Structurally normal pulmonic valve without significant stenosis or regurgitation. Pericardium Normal pericardium without effusion. Aorta Normal aortic root for body surface  "area. The ascending aorta diameter is 3.5 cm. Michael Acosta MD (Electronically Signed) Final Date:     05 November 2022                 14:02      Micro:  Results       Procedure Component Value Units Date/Time    CULTURE STOOL [[id number]] Collected: 11/05/22 0450    Order Status: Completed Specimen: Stool Updated: 11/08/22 1518     Significant Indicator NEG     Source STL     Site STOOL     Culture Result No enteric pathogens isolated.  NOTE:  Stool cultures are screened for Shiga Toxins 1 and 2,  Salmonella, Shigella, Campylobacter, Aeromonas,  Plesiomonas, and Vibrio.       EHEC Negative for Shiga Toxin 1 and 2.     Campylobactor Antigen --     Negative for Campylobacter Antigen.  Test results are to be used in conjunction with information  available from the patient clinical evaluation and other  diagnostic procedures.      Narrative:      For adult patients only; culture includes screen for  Campylobacter.  For adult patients only; culture includes screen for    BLOOD CULTURE [[id number]] Collected: 11/07/22 1102    Order Status: Completed Specimen: Blood from Peripheral Updated: 11/08/22 0731     Significant Indicator NEG     Source BLD     Site PERIPHERAL     Culture Result No Growth  Note: Blood cultures are incubated for 5 days and  are monitored continuously.Positive blood cultures  are called to the RN and reported as soon as  they are identified.      Narrative:      Per Hospital Policy: Only change Specimen Src: to \"Line\" if  specified by physician order.  Per Hospital Policy: Only change Specimen Src: to \"Line\" if    BLOOD CULTURE [[id number]] Collected: 11/07/22 1053    Order Status: Completed Specimen: Blood from Peripheral Updated: 11/08/22 0731     Significant Indicator NEG     Source BLD     Site PERIPHERAL     Culture Result No Growth  Note: Blood cultures are incubated for 5 days and  are monitored continuously.Positive blood cultures  are called to the RN and reported as soon as  they are " "identified.      Narrative:      Per Hospital Policy: Only change Specimen Src: to \"Line\" if  specified by physician order.  No site indicated    Blood Culture [[id number]]  (Abnormal)  (Susceptibility) Collected: 11/05/22 0414    Order Status: Completed Specimen: Blood from Peripheral Updated: 11/07/22 1114     Significant Indicator POS     Source BLD     Site PERIPHERAL     Culture Result Growth detected by Bactec instrument. 11/05/2022  15:42      Streptococcus pyogenes (Group A)  This isolate does NOT demonstrate inducible Clindamycin  resistance in vitro.      Narrative:      CALL  [place]  161 tel. [phone],  CALLED  161 tel. [phone] 11/06/2022, 12:01, RB PERF. RESULTS CALLED TO:  [id number]  1 of 2 for Blood Culture x 2 sites order. Per Hospital  Policy: Only change Specimen Src: to \"Line\" if specified by  physician order.  Left Hand    Susceptibility       Streptococcus pyogenes (group a) (1)       Antibiotic Interpretation Microscan   Method Status    Penicillin Sensitive 0.012 mcg/mL E-TEST Final    Cefotaxime Sensitive 0.012 mcg/mL E-TEST Final    Clindamycin Sensitive - mcg/mL E-TEST Final                       Blood Culture [[id number]]  (Abnormal) Collected: 11/05/22 0415    Order Status: Completed Specimen: Blood from Peripheral Updated: 11/07/22 1102     Significant Indicator POS     Source BLD     Site PERIPHERAL     Culture Result Growth detected by Bactec instrument. 11/05/2022  14:27      Streptococcus pyogenes (Group A)  See previous culture for sensitivity report.      Narrative:      CALL  [place]  161 tel. [phone],  CALLED  161 tel. [phone] 11/05/2022, 14:52, RB PERF. RESULTS CALLED  TO:[id number]  2 of 2 blood culture x2  Sites order. Per Hospital Policy:  Only change Specimen Src: to \"Line\" if specified by physician  order.  Right Wrist    E-Test [[id number]] Collected: 11/05/22 0414    Order Status: Completed Specimen: Other Updated: 11/07/22 1100     ETEST Sensitivity FINAL    Narrative:      161 " "tel. [phone] 11/06/2022, 12:01, RB PERF. RESULTS CALLED TO: [id number]  1 of 2 for Blood Culture x 2 sites order. Per Hospital  Policy: Only change Specimen Src: to \"Line\" if specified by  physician order.    Urine Culture (NEW) [[id number]] Collected: 11/05/22 0500    Order Status: Completed Specimen: Urine Updated: 11/07/22 0950     Significant Indicator NEG     Source UR     Site -     Culture Result No growth at 48 hours.    Narrative:      Indication for culture:->Evaluation for sepsis without a  clear source of infection  Indication for culture:->Evaluation for sepsis without a    EHEC(Shiga Toxin)Detection [[id number]] Collected: 11/05/22 0450    Order Status: Completed Specimen: Stool Updated: 11/06/22 1531     Significant Indicator NEG     Source STL     Site STOOL     EHEC Negative for Shiga Toxin 1 and 2.    Narrative:      For adult patients only; culture includes screen for  Campylobacter.  For adult patients only; culture includes screen for    MRSA By PCR (Amp) [[id number]] Collected: 11/05/22 1156    Order Status: Completed Specimen: Respirate from Nares Updated: 11/05/22 1433     MRSA by PCR POSITIVE    Narrative:      Collected By: [id number] VYSHUTINA HERBERTH O    C Diff by PCR rflx Toxin [[id number]] Collected: 11/05/22 0450    Order Status: Completed Specimen: Stool Updated: 11/05/22 0804     C Diff by PCR Negative     Comment: C. difficile NOT detected by PCR.  Treatment not indicated per guidelines.  Repeat testing not indicated within 7 days.          027-NAP1-BI Presumptive Negative     Comment: Presumptive 027/NAP1/BI target DNA sequences are NOT DETECTED.       Narrative:      Does this patient have risk factors for C-diff?->Yes  C-Diff Risk Factors->immunocompromising conditions    CoV-2, FLU A/B, and RSV by PCR (2-4 Hours CEPHEID) : Collect NP swab in VTM [[id number]] Collected: 11/05/22 0507    Order Status: Completed Specimen: Respirate Updated: 11/05/22 0606     Influenza virus A RNA Negative " "    Influenza virus B, PCR Negative     RSV, PCR Negative     SARS-CoV-2 by PCR NotDetected     Comment: PATIENTS: Important information regarding your results and instructions can  be found at [email or web address]   \"After your  Covid-19 Test\"    [facility] providers: PLEASE REFER TO DE-ESCALATION AND RETESTING PROTOCOL  on [email or web address]    **The [facility] GeneXpert Xpress SARS-CoV-2 RT-PCR Test has been made  available for use under the Emergency Use Authorization (EUA) only.          SARS-CoV-2 Source NP Swab    Urinalysis [[id number]]  (Abnormal) Collected: 11/05/22 0500    Order Status: Completed Specimen: Urine Updated: 11/05/22 0555     Color DK Yellow     Character Turbid     Specific Gravity 1.017     Ph 5.0     Glucose 100 mg/dL      Ketones Trace mg/dL      Protein 30 mg/dL      Bilirubin Small     Urobilinogen, Urine 1.0     Nitrite Positive     Leukocyte Esterase Small     Occult Blood Negative     Micro Urine Req Microscopic    Narrative:      Indication for culture:->Evaluation for sepsis without a  clear source of infection            Assessment:  This is a 57-year-old male patient with type 2 diabetes, nonischemic cardiomyopathy, A. fib on amiodarone and Eliquis, prior hospitalization for bowel perforation, presented with 4 days of rigors, watery diarrhea, fevers has a chronic superficial left heel wound, was found to be in septic shock with acute renal failure and lactic acidosis, blood cultures positive for GPC in chains.  CT scan of the abdomen pelvis with no inflammation.  Chest x-ray with interstitial opacities.  TTE with EF of 25 to 30%, no obvious valvular vegetations were noted.    Plan:  -Blood cultures positive for Group A Strep  -Multiple allergies including Unasyn, Zosyn, patient prefers not to attempt challenge.  Continue IV Ancef 2 g every 8 hours  -Additional imaging of the left elbow pending MRI  -Repeat blood cultures x2 pending    Discussed with internal " medicine, Dr. Johnson

## 2022-11-09 NOTE — HEART FAILURE PROGRAM
Patient is admitted on 11/5/22 primarily for sepsis and required fluid resuscitation and pressors. HF was added to patient's problems on 11/8/22 triggering my awareness of the admission. It appears that there has been iatrogenic HF decompensation and that his EF has dropped from 55% to 30%.     Patient has been a resident at [facility] for about a year now. Last appointment at the HF Clinic was in August of this year. Pt has an appointment with Dr. Lemos (electrophysiology) at the end of this month. This was recommended by the HF Clinic.    I did ask the schedulers to arrange f/u at the HF clinic in just over a week or so (doesn't have to be 7 days because pt residing at [facility]).    Special Clinical Considerations Pertinent to HF    Pneumococcal vaccine: previous  Influenza vaccine: missing messaged pharmacistKareen and BSRNTherese  Nicotine Status: never per ERP Note  Documentation of nicotine cessation counseling: n/a  DM dx: not diagnosed  Atrial arrhythmia dx: yes apixaban  Indication for hydralazine dinitrate per facesheet: no  AHA Interactive HF education prompt placed in f/u section: yes  Referral to Cardiac Rehab, Patient Criteria: Stable, chronic heart failure patients who can receive Medicare coverage are defined as patients with left ventricular ejection fraction of 35% or less and New York Heart Association (NYHA) Class II to IV symptoms on optimal heart failure therapy for at least six weeks    Nurses: Please document HF education in the education tab and populate the new and improved HF Care Plan. These tools will guide day to day care of the HF patient.    Providers: below are Guideline Directed Medical Therapy (GDMT) for HFrEF. If any cannot be prescribed by discharge, would you please note the clinical reason for each in your discharge summary? Thanks! Glenna  Evidence Based Beta Blocker (bisoprolol, carvedilol, or toprol xl), for EF of 40% or less  CLIF - I, for EF of 40% or less ARNI  is preferred If not cost prohibitive for patient  SGLT2 inhibitor with proven ASCVD, HF, or DKD benefit Jardiance/empagliflozin): If not cost prohibitive for patient  Aldosterone antagonist, for EF of 40% or less  Anticoagulation for atrial arrhythmia, if applicable  Glycemic control for DM + HF, if applicable  Lipid lowering medication for DM + HF, if applicable  Hydralazine Hydrochloride/Isosorbide Dinitrate. The combination of hydralazine and isosorbide- dinitrate is recommended to reduce morbidity and mortality for patients self-described  Americans with NYHA class III-IV HFrEF (EF 40% or less), receiving optimal therapy with ACE inhibitors and beta blockers, unless contraindicated (Class I, ERIK: A).  Pneumococcal vaccine, if not previously received  Influenza vaccine for current season, if not previously received  Nicotine cessation counseling documented, if applicable  Device screening, if applicable  Referral to disease management program specializing in heart failure care- requested that schedulers notify me if patient cannot be scheduled at the Heart Failure Clinic  Referral to Cardiac Rehab: Patient Criteria: Stable, chronic heart failure patients who can receive Medicare coverage are defined as patients with left ventricular ejection fraction of 35% or less and New York Heart Association (NYHA) Class II to IV symptoms on optimal heart failure therapy for at least six weeks.  7 calendar day f/u appointment scheduled prior to discharge, appearing on signed after visit summary.

## 2022-11-09 NOTE — PROGRESS NOTES
Report given to Tamy PÉREZ. Patient AOx4, VSS, on room air. Transferred to T734 with all belongings - cell phone, wallet, and .

## 2022-11-09 NOTE — CARE PLAN
The patient is Stable - Low risk of patient condition declining or worsening    Shift Goals Q2T for skin care  Clinical Goals: monitor VS, reposition, ABX  Patient Goals: sleep  Family Goals: n/a    Progress made toward(s) clinical / shift goals:  Q2T for skin care, OOB, IV ABX

## 2022-11-09 NOTE — PROGRESS NOTES
Pt transferred from [facility] with all belongings. A&Ox4, RA-1L NC, placed on telebox, frye in place, oriented to room and and call light. All questions answered.

## 2022-11-09 NOTE — PROGRESS NOTES
Hospital Medicine Daily Progress Note    Date of Service  11/9/2022    Chief Complaint  Sebastián Castro is a 57 y.o. male admitted 11/5/2022 with septic shock.    Hospital Course  57 y.o. male with a past medical history of permanent A. fib on amiodarone and Eliquis, non-insulin-dependent diabetes mellitus type 2, nonischemic cardiomyopathy and prior hospitalization for bowel perforation status post expiratory laparotomy presents to the hospital with complaint of rigors on 11/5/2022.  On presentation patient reported that he has been having symptoms of diarrhea and rigors for the last 4 days which is associated with fevers.  He has been living at the skilled nursing facility for the past 1 year.  He reported generalized weakness and lightheadedness with visual changes on admission.  On admission he found to have septic shock and received IV fluid bolus and started him on IV antibiotics and he was placed on pressor due to hypotension with a target map of greater than 65.  Infectious disease consulted for positive blood cultures and the recommended CÉSAR if persistently positive blood cultures.  He was placed on cefepime and Flagyl.  There would be possible source of joint infection of left elbow and he underwent ultrasound did not show fluid collection and MRI ordered to evaluate for septic arthritis.  TTE did not show signs of endocarditis.  He found to have acute kidney injury it has been improving.  For his atrial fibrillation currently he is on rate control and he has been receiving Eliquis for anticoagulation.    Interval Problem Update  No acute events overnight.  Patient feels well, denies too much elbow discomfort.  MRI elbow pending to evaluate for septic arthritis.  Continue ancef for strep bacteremia. ID following.  Echo shows EF 30%, worse than prior 55%, patient reports hx of heart failure for unknown reason.  Cr improving.  Decreasing hydrocortisone dose for stress dose steroids.    I have discussed this  patient's plan of care and discharge plan at IDT rounds today with Case Management, Nursing, Nursing leadership, and other members of the IDT team.    Consultants/Specialty  critical care and infectious disease    Code Status  Full Code    Disposition  Patient is not medically cleared for discharge.   Anticipate discharge to to home with close outpatient follow-up.  I have placed the appropriate orders for post-discharge needs.    Review of Systems  Review of Systems   Constitutional:  Negative for chills and fever.   Eyes:  Negative for blurred vision and pain.   Respiratory:  Negative for cough and shortness of breath.    Cardiovascular:  Negative for chest pain, palpitations and leg swelling.   Gastrointestinal:  Negative for abdominal pain, nausea and vomiting.   Genitourinary:  Negative for dysuria and urgency.   Musculoskeletal:  Positive for joint pain. Negative for back pain.   Skin:  Negative for itching and rash.   Neurological:  Negative for dizziness, sensory change, focal weakness and headaches.   Psychiatric/Behavioral:  Negative for substance abuse. The patient does not have insomnia.       Physical Exam  Temp:  [36.3 °C (97.3 °F)-36.9 °C (98.4 °F)] 36.9 °C (98.4 °F)  Pulse:  [76-99] 94  Resp:  [8-23] 18  BP: (102-159)/(64-88) 124/80  SpO2:  [91 %-97 %] 91 %    Physical Exam  Constitutional:       General: He is not in acute distress.     Appearance: He is not ill-appearing.   HENT:      Head: Normocephalic and atraumatic.      Right Ear: External ear normal.      Left Ear: External ear normal.      Mouth/Throat:      Pharynx: No oropharyngeal exudate or posterior oropharyngeal erythema.   Eyes:      Extraocular Movements: Extraocular movements intact.      Pupils: Pupils are equal, round, and reactive to light.   Cardiovascular:      Rate and Rhythm: Normal rate and regular rhythm.      Pulses: Normal pulses.      Heart sounds: Normal heart sounds.   Pulmonary:      Effort: Pulmonary effort is  normal. No respiratory distress.      Breath sounds: Normal breath sounds. No wheezing or rales.   Abdominal:      General: Bowel sounds are normal. There is no distension.      Palpations: Abdomen is soft.      Tenderness: There is no abdominal tenderness. There is no guarding or rebound.   Musculoskeletal:         General: Swelling present. No tenderness.      Cervical back: Normal range of motion and neck supple.      Right lower leg: No edema.      Left lower leg: No edema.      Comments: Left elbow with some inflammation, no open wounds or drainage   Skin:     General: Skin is warm and dry.   Neurological:      General: No focal deficit present.      Mental Status: He is oriented to person, place, and time.      Cranial Nerves: No cranial nerve deficit.      Sensory: No sensory deficit.      Motor: No weakness.   Psychiatric:         Mood and Affect: Mood normal.         Behavior: Behavior normal.       Fluids    Intake/Output Summary (Last 24 hours) at 11/9/2022 1219  Last data filed at 11/9/2022 1100  Gross per 24 hour   Intake 598.66 ml   Output 1600 ml   Net -1001.34 ml       Laboratory  Recent Labs     11/07/22  0605 11/08/22  0520 11/09/22  0344   WBC 15.1* 13.0* 13.4*   RBC 3.85* 3.84* 3.88*   HEMOGLOBIN 11.5* 11.3* 11.5*   HEMATOCRIT 33.6* 33.1* 34.3*   MCV 87.3 86.2 88.4   MCH 29.9 29.4 29.6   MCHC 34.2 34.1 33.5*   RDW 47.5 46.5 49.2   PLATELETCT 121* 137* 160*   MPV 12.3 12.2 11.7     Recent Labs     11/07/22  2340 11/08/22  0520 11/09/22  0344   SODIUM 133* 135 132*   POTASSIUM 3.4* 3.5* 3.8   CHLORIDE 104 107 106   CO2 17* 18* 16*   GLUCOSE 201* 191* 185*   BUN 65* 63* 59*   CREATININE 1.59* 1.44* 1.26   CALCIUM 8.7 8.9 8.8                   Imaging  DX-CHEST-PORTABLE (1 VIEW)   Final Result      1.  Stable cardiomegaly and interstitial edema.   2.  Stable linear atelectasis in the right lower lung.         US-EXTREMITY NON VASCULAR UNILATERAL LEFT   Final Result      Soft tissue edema in the area  of concern about the left elbow without evidence of fluid collection/abscess.      EC-ECHOCARDIOGRAM COMPLETE W/ CONT   Final Result      DX-ELBOW-COMPLETE 3+ LEFT   Final Result      Normal elbow series.      CT-ABDOMEN-PELVIS W/O   Final Result      1.  No evidence of bowel obstruction or focal inflammatory change.      2.  Gallstones.      3.  Mildly distended colon which also contains fluid. No evidence of bowel obstruction.      DX-CHEST-PORTABLE (1 VIEW)   Final Result      1.  Cardiomegaly. There is also interstitial edema.      2.  Right IJ central line present without evidence of complication.      3.  Linear atelectasis within the right lung base.      DX-CHEST-PORTABLE (1 VIEW)   Final Result      1.  Hazy opacity at the right lower lung, similar to 5/25/2021 exam. Scarring versus pneumonia.      MR-ELBOW-WITH LEFT    (Results Pending)        Assessment/Plan  * Severe sepsis with septic shock (HCC)- (present on admission)  Assessment & Plan  Continue intravenous antimicrobial therapy  Shock has resolved - he is off vasopressor (levophed).  Sepsis is improving  Taper hydrocortisone, 50 mg IV daily    Swelling of left elbow joint  Assessment & Plan  Edema and Erythema of Left Elbow Joint - Native Joint  Significant Tenderness to palpation.   MRI pending, Consider Septic Arthiritis, will consult ortho pending results.  I changed MRI from without contrast to with contrast to evaluate for septic arthritis.      Acute on chronic heart failure (HCC)  Assessment & Plan  Hx of Nonischemic Cardiomyopathy  Lower Extremity Edema   BNP > 33,000  Echo from 2021 with EF of 55%  Repeat echo this admission revealed a EF of 25 to 30%    Streptococcal bacteremia  Assessment & Plan  Blood cultures positive for streptococcus, source unclear  Recent TTE negative for endocarditis  ID consulted and following          Continue IV Ancef 2g q 8 hrs   I discussed plan of care with him.    Thrombocytopenia (HCC)- (present on  admission)  Assessment & Plan  Stable current platelet count is 137.  No signs of active bleeding.    Gastroenteritis  Assessment & Plan  Started 4 days prior to Admission  CT Abdomen unremarkable.  Improving slightly.  Will continue to monitor  Antidiarrheals prn      Bacteremia due to Gram-positive bacteria  Assessment & Plan  Group A streptococcus Bacteremia, source unclear   Was in Septic Shock  Initially given Fluid administration per sepsis protocol.   Started on Pressor Levophed on 11/5 -> D/Cd on 11/8.    Started on Hydrocortisone 11/6 - Stress dose, hx of adrenal insuff.  Abdominal CT and Chest Xray unremarkable.  TTE without evidence of endocarditis  UA mild UTI, culture negative to date.  Suspected left elbow as infection source.    ID consulted, pt started on cefepime and flagyl empirically.     On 11/6 daptomycin was added for Enterococcus coverage.    On 11/7 cefepime, flagyl, & daptomycin D/Cd. Started IV Ancef 2 g every 8 hours. Rpt Blood Cx obtained.     - Taper hydrocortisone, 50 mg IV daily  - Continue IV Ancef 2 g q8 hrs  - Continue to Follow Rpt Blood Cxs, negative to date.   - MRI left elbow pending - suspecting Septic Arthritis. Will consult Ortho, pending results.     Permanent atrial fibrillation (HCC)- (present on admission)  Assessment & Plan  Currently in atrial fibrillation with RVR.   Rate controlled              Continue apixaban, 5 mg twice daily              Continue amiodarone  Will restart metoprolol as BP is stable.     Hypothyroidism- (present on admission)  Assessment & Plan  Continue Synthroid    Acute kidney injury (HCC)  Assessment & Plan  Initially Creatinine of 3.13 and GFR of 22  Improving with Creatinine 1.2    -Daily BMP to monitor   -Strict Is/Os   -Avoid Nephrotoxins and Renally Dose Meds       Obesity due to excess calories with serious comorbidity- (present on admission)  Assessment & Plan  Will need counseling when clinically appropriate    Hypokalemia- (present on  admission)  Assessment & Plan  From diarrhea  Replace         VTE prophylaxis: therapeutic anticoagulation with eliquis    I have performed a physical exam and reviewed and updated ROS and Plan today (11/9/2022). In review of yesterday's note (11/8/2022), there are no changes except as documented above.

## 2022-11-09 NOTE — PROGRESS NOTES
4 Eyes Skin Assessment Completed by ELISA Morelos and ELISA Tinoco.    Head WDL  Ears WDL  Nose WDL  Mouth WDL  Neck WDL  Breast/Chest Redness  Shoulder Blades WDL  Spine WDL  (R) Arm/Elbow/Hand WDL  (L) Arm/Elbow/Hand Redness and Edema  Abdomen Redness  Groin Redness  Scrotum/Coccyx/Buttocks Redness and Blanching  (R) Leg Swelling  (L) Leg Swelling  (R) Heel/Foot/Toe WDL  (L) Heel/Foot/Toe Ulcer(s)          Devices In Places Tele Box, Blood Pressure Cuff, Pulse Ox, and Zabala      Interventions In Place NC W/Ear Foams, InterDry, Heel Mepilex, Waffle Overlay, TAP System, Pillows, Q2 Turns, Barrier Cream, and Pressure Redistribution Mattress    Possible Skin Injury Yes    Pictures Uploaded Into Epic Yes  Wound Consult Placed Yes  RN Wound Prevention Protocol Ordered Yes

## 2022-11-10 LAB
ALBUMIN SERPL BCP-MCNC: 2.6 G/DL (ref 3.2–4.9)
ALBUMIN/GLOB SERPL: 0.8 G/DL
ALP SERPL-CCNC: 146 U/L (ref 30–99)
ALT SERPL-CCNC: 61 U/L (ref 2–50)
ANION GAP SERPL CALC-SCNC: 8 MMOL/L (ref 7–16)
AST SERPL-CCNC: 141 U/L (ref 12–45)
BILIRUB SERPL-MCNC: 2.8 MG/DL (ref 0.1–1.5)
BUN SERPL-MCNC: 50 MG/DL (ref 8–22)
CALCIUM SERPL-MCNC: 9 MG/DL (ref 8.5–10.5)
CHLORIDE SERPL-SCNC: 107 MMOL/L (ref 96–112)
CO2 SERPL-SCNC: 19 MMOL/L (ref 20–33)
CREAT SERPL-MCNC: 1.18 MG/DL (ref 0.5–1.4)
ERYTHROCYTE [DISTWIDTH] IN BLOOD BY AUTOMATED COUNT: 48.9 FL (ref 35.9–50)
GFR SERPLBLD CREATININE-BSD FMLA CKD-EPI: 71 ML/MIN/1.73 M 2
GLOBULIN SER CALC-MCNC: 3.4 G/DL (ref 1.9–3.5)
GLUCOSE BLD STRIP.AUTO-MCNC: 130 MG/DL (ref 65–99)
GLUCOSE BLD STRIP.AUTO-MCNC: 136 MG/DL (ref 65–99)
GLUCOSE BLD STRIP.AUTO-MCNC: 145 MG/DL (ref 65–99)
GLUCOSE BLD STRIP.AUTO-MCNC: 155 MG/DL (ref 65–99)
GLUCOSE SERPL-MCNC: 118 MG/DL (ref 65–99)
HCT VFR BLD AUTO: 33.6 % (ref 42–52)
HGB BLD-MCNC: 11.2 G/DL (ref 14–18)
MCH RBC QN AUTO: 29.3 PG (ref 27–33)
MCHC RBC AUTO-ENTMCNC: 33.3 G/DL (ref 33.7–35.3)
MCV RBC AUTO: 88 FL (ref 81.4–97.8)
PLATELET # BLD AUTO: 228 K/UL (ref 164–446)
PMV BLD AUTO: 11 FL (ref 9–12.9)
POTASSIUM SERPL-SCNC: 3.7 MMOL/L (ref 3.6–5.5)
PROT SERPL-MCNC: 6 G/DL (ref 6–8.2)
RBC # BLD AUTO: 3.82 M/UL (ref 4.7–6.1)
SODIUM SERPL-SCNC: 134 MMOL/L (ref 135–145)
WBC # BLD AUTO: 14.6 K/UL (ref 4.8–10.8)

## 2022-11-10 PROCEDURE — A9270 NON-COVERED ITEM OR SERVICE: HCPCS | Performed by: INTERNAL MEDICINE

## 2022-11-10 PROCEDURE — 700102 HCHG RX REV CODE 250 W/ 637 OVERRIDE(OP): Performed by: INTERNAL MEDICINE

## 2022-11-10 PROCEDURE — 36415 COLL VENOUS BLD VENIPUNCTURE: CPT

## 2022-11-10 PROCEDURE — 700105 HCHG RX REV CODE 258: Performed by: INTERNAL MEDICINE

## 2022-11-10 PROCEDURE — 700111 HCHG RX REV CODE 636 W/ 250 OVERRIDE (IP): Performed by: STUDENT IN AN ORGANIZED HEALTH CARE EDUCATION/TRAINING PROGRAM

## 2022-11-10 PROCEDURE — 85027 COMPLETE CBC AUTOMATED: CPT

## 2022-11-10 PROCEDURE — 99232 SBSQ HOSP IP/OBS MODERATE 35: CPT | Performed by: INTERNAL MEDICINE

## 2022-11-10 PROCEDURE — A9270 NON-COVERED ITEM OR SERVICE: HCPCS | Performed by: STUDENT IN AN ORGANIZED HEALTH CARE EDUCATION/TRAINING PROGRAM

## 2022-11-10 PROCEDURE — 80053 COMPREHEN METABOLIC PANEL: CPT

## 2022-11-10 PROCEDURE — 700102 HCHG RX REV CODE 250 W/ 637 OVERRIDE(OP): Performed by: STUDENT IN AN ORGANIZED HEALTH CARE EDUCATION/TRAINING PROGRAM

## 2022-11-10 PROCEDURE — 700111 HCHG RX REV CODE 636 W/ 250 OVERRIDE (IP): Performed by: INTERNAL MEDICINE

## 2022-11-10 PROCEDURE — 770020 HCHG ROOM/CARE - TELE (206)

## 2022-11-10 PROCEDURE — 82962 GLUCOSE BLOOD TEST: CPT

## 2022-11-10 PROCEDURE — 99232 SBSQ HOSP IP/OBS MODERATE 35: CPT | Performed by: STUDENT IN AN ORGANIZED HEALTH CARE EDUCATION/TRAINING PROGRAM

## 2022-11-10 RX ORDER — SPIRONOLACTONE 25 MG/1
25 TABLET ORAL
Status: DISCONTINUED | OUTPATIENT
Start: 2022-11-10 | End: 2022-11-17 | Stop reason: HOSPADM

## 2022-11-10 RX ORDER — TAMSULOSIN HYDROCHLORIDE 0.4 MG/1
0.4 CAPSULE ORAL
Status: DISCONTINUED | OUTPATIENT
Start: 2022-11-10 | End: 2022-11-17 | Stop reason: HOSPADM

## 2022-11-10 RX ORDER — METOPROLOL SUCCINATE 50 MG/1
50 TABLET, EXTENDED RELEASE ORAL DAILY
Status: DISCONTINUED | OUTPATIENT
Start: 2022-11-10 | End: 2022-11-17 | Stop reason: HOSPADM

## 2022-11-10 RX ORDER — LISINOPRIL 20 MG/1
20 TABLET ORAL DAILY
Status: DISCONTINUED | OUTPATIENT
Start: 2022-11-10 | End: 2022-11-17 | Stop reason: HOSPADM

## 2022-11-10 RX ADMIN — POTASSIUM CHLORIDE 20 MEQ: 1500 TABLET, EXTENDED RELEASE ORAL at 11:49

## 2022-11-10 RX ADMIN — INSULIN HUMAN 2 UNITS: 100 INJECTION, SOLUTION PARENTERAL at 08:10

## 2022-11-10 RX ADMIN — CEFAZOLIN 2 G: 2 INJECTION, POWDER, FOR SOLUTION INTRAMUSCULAR; INTRAVENOUS at 05:29

## 2022-11-10 RX ADMIN — TRAZODONE HYDROCHLORIDE 50 MG: 50 TABLET ORAL at 20:39

## 2022-11-10 RX ADMIN — SPIRONOLACTONE 25 MG: 25 TABLET ORAL at 12:43

## 2022-11-10 RX ADMIN — CEFAZOLIN 2 G: 2 INJECTION, POWDER, FOR SOLUTION INTRAMUSCULAR; INTRAVENOUS at 13:30

## 2022-11-10 RX ADMIN — AMIODARONE HYDROCHLORIDE 400 MG: 200 TABLET ORAL at 05:26

## 2022-11-10 RX ADMIN — METOPROLOL SUCCINATE 50 MG: 50 TABLET, EXTENDED RELEASE ORAL at 08:04

## 2022-11-10 RX ADMIN — AMIODARONE HYDROCHLORIDE 400 MG: 200 TABLET ORAL at 18:00

## 2022-11-10 RX ADMIN — POTASSIUM CHLORIDE 20 MEQ: 1500 TABLET, EXTENDED RELEASE ORAL at 05:26

## 2022-11-10 RX ADMIN — CEFAZOLIN 2 G: 2 INJECTION, POWDER, FOR SOLUTION INTRAMUSCULAR; INTRAVENOUS at 21:09

## 2022-11-10 RX ADMIN — APIXABAN 5 MG: 5 TABLET, FILM COATED ORAL at 05:26

## 2022-11-10 RX ADMIN — APIXABAN 5 MG: 5 TABLET, FILM COATED ORAL at 18:00

## 2022-11-10 RX ADMIN — HYDROCODONE BITARTRATE AND ACETAMINOPHEN 2 TABLET: 5; 325 TABLET ORAL at 05:32

## 2022-11-10 RX ADMIN — HYDROCORTISONE SODIUM SUCCINATE 50 MG: 100 INJECTION, POWDER, FOR SOLUTION INTRAMUSCULAR; INTRAVENOUS at 05:26

## 2022-11-10 RX ADMIN — NYSTATIN: 100000 POWDER TOPICAL at 18:00

## 2022-11-10 RX ADMIN — LISINOPRIL 20 MG: 20 TABLET ORAL at 08:04

## 2022-11-10 RX ADMIN — LEVOTHYROXINE SODIUM 150 MCG: 0.07 TABLET ORAL at 05:26

## 2022-11-10 RX ADMIN — NYSTATIN: 100000 POWDER TOPICAL at 05:27

## 2022-11-10 RX ADMIN — TAMSULOSIN HYDROCHLORIDE 0.4 MG: 0.4 CAPSULE ORAL at 08:04

## 2022-11-10 ASSESSMENT — ENCOUNTER SYMPTOMS
BACK PAIN: 0
ABDOMINAL PAIN: 0
VOMITING: 0
DIZZINESS: 0
FOCAL WEAKNESS: 0
COUGH: 0
HEADACHES: 0
PALPITATIONS: 0
DIARRHEA: 0
NAUSEA: 0
INSOMNIA: 0
BLURRED VISION: 0
EYE PAIN: 0
SENSORY CHANGE: 0
CHILLS: 0
SHORTNESS OF BREATH: 0
FEVER: 0

## 2022-11-10 ASSESSMENT — FIBROSIS 4 INDEX: FIB4 SCORE: 4.67

## 2022-11-10 ASSESSMENT — LIFESTYLE VARIABLES: SUBSTANCE_ABUSE: 0

## 2022-11-10 ASSESSMENT — PAIN DESCRIPTION - PAIN TYPE: TYPE: ACUTE PAIN

## 2022-11-10 NOTE — PROGRESS NOTES
Superficial infectious Disease Progress Note    Author: Rai Rodriguez M.D. Date & Time of service: 11/10/2022  11:36 AM    Chief Complaint:  Follow-up for bacteremia    Interval History:   T-max 102.6 on , white count up to 15.1 today, on cefepime and Flagyl.  Continues to have left elbow pain   patient afebrile since antibiotic initiation, white count of 11,000, tolerated switch to Ancef.   patient remains afebrile, white count is 13.4, tolerating Ancef.  MRI remains pending.  Elbow pain much better  11/10 patient remains afebrile, count is still elevated, 14.6 today, MRI still pending    Labs Reviewed and Medications Reviewed.    Review of Systems:  Review of Systems   Constitutional:  Negative for chills and fever.   Gastrointestinal:  Negative for abdominal pain, diarrhea, nausea and vomiting.   Musculoskeletal:  Positive for joint pain.   Skin:  Negative for itching and rash.   All other systems reviewed and are negative.    Hemodynamics:  Temp (24hrs), Av.9 °C (98.4 °F), Min:36.7 °C (98.1 °F), Max:37.1 °C (98.8 °F)  Temperature: 37 °C (98.6 °F), Monitored Temp: 36.6 °C (97.9 °F)  Pulse  Av  Min: 76  Max: 140   Blood Pressure: 111/73       Physical Exam:  Physical Exam  Vitals and nursing note reviewed.   Constitutional:       General: He is not in acute distress.     Appearance: He is ill-appearing.      Comments: Chronically ill-appearing   HENT:      Mouth/Throat:      Comments: Poor dentition  Eyes:      General: No scleral icterus.        Right eye: No discharge.         Left eye: No discharge.      Conjunctiva/sclera: Conjunctivae normal.   Cardiovascular:      Rate and Rhythm: Normal rate and regular rhythm.      Heart sounds: No murmur heard.  Pulmonary:      Effort: Pulmonary effort is normal. No respiratory distress.      Breath sounds: No stridor.   Abdominal:      General: Abdomen is flat. There is no distension.      Tenderness: There is no abdominal tenderness.    Musculoskeletal:         General: Swelling and tenderness present.      Comments: Left posterior elbow swelling with erythema significantly improved, pain with passive motion also significantly improved   Skin:     Findings: Erythema present.   Neurological:      General: No focal deficit present.      Mental Status: He is alert and oriented to person, place, and time.   Psychiatric:      Comments: Flat affect       Meds:    Current Facility-Administered Medications:     lisinopril    metoprolol SR    tamsulosin    influenza vaccine quad    potassium chloride SA    HYDROcodone-acetaminophen    amiodarone **FOLLOWED BY** [START ON 11/12/2022] amiodarone **FOLLOWED BY** [START ON 11/17/2022] amiodarone    ceFAZolin    insulin regular **AND** POC blood glucose manual result **AND** NOTIFY MD and PharmD **AND** Administer 20 grams of glucose (approximately 8 ounces of fruit juice) every 15 minutes PRN FSBG less than 70 mg/dL **AND** dextrose bolus    acetaminophen    levothyroxine    apixaban    traZODone    senna-docusate **AND** polyethylene glycol/lytes **AND** magnesium hydroxide **AND** bisacodyl    nystatin    Labs:  Recent Labs     11/08/22  0520 11/09/22  0344 11/10/22  0356   WBC 13.0* 13.4* 14.6*   RBC 3.84* 3.88* 3.82*   HEMOGLOBIN 11.3* 11.5* 11.2*   HEMATOCRIT 33.1* 34.3* 33.6*   MCV 86.2 88.4 88.0   MCH 29.4 29.6 29.3   RDW 46.5 49.2 48.9   PLATELETCT 137* 160* 228   MPV 12.2 11.7 11.0   NEUTSPOLYS 88.10* 81.50*  --    LYMPHOCYTES 5.80* 8.30*  --    MONOCYTES 3.80 4.40  --    EOSINOPHILS 0.00 0.10  --    BASOPHILS 0.20 0.70  --        Recent Labs     11/08/22  0520 11/09/22  0344 11/10/22  0356   SODIUM 135 132* 134*   POTASSIUM 3.5* 3.8 3.7   CHLORIDE 107 106 107   CO2 18* 16* 19*   GLUCOSE 191* 185* 118*   BUN 63* 59* 50*       Recent Labs     11/08/22 0520 11/09/22 0344 11/10/22  0356   ALBUMIN 2.4* 2.6* 2.6*   TBILIRUBIN 3.0* 2.7* 2.8*   ALKPHOSPHAT 152* 158* 146*   TOTPROTEIN 6.1 6.1 6.0   ALTSGPT  82* 89* 61*   ASTSGOT 127* 160* 141*   CREATININE 1.44* 1.26 1.18         Imaging:  CT-ABDOMEN-PELVIS W/O    Result Date: 11/5/2022 11/5/2022 9:05 AM HISTORY/REASON FOR EXAM:  Abdominal distention. TECHNIQUE/EXAM DESCRIPTION: CT scan of the abdomen and pelvis without contrast. Noncontrast helical scanning was obtained from the diaphragmatic domes through the pubic symphysis. Low dose optimization technique was utilized for this CT exam including automated exposure control and adjustment of the mA and/or kV according to patient size. COMPARISON: None. FINDINGS: Lower Chest: There is bibasilar atelectasis.. Liver: Normal. Spleen: Calcified granulomas. Pancreas: Atrophic. Gallbladder: There are multiple gallstones. Biliary: Nondilated. Adrenal glands: Normal. Kidneys: Unremarkable without hydronephrosis. Bowel: The colon is mildly distended and contains fluid.. Lymph nodes: No adenopathy. Vasculature: Unremarkable. Peritoneum: Unremarkable without ascites. Musculoskeletal: There are chronic compression fractures of the spine.. Pelvis: No adenopathy or free fluid.     1.  No evidence of bowel obstruction or focal inflammatory change. 2.  Gallstones. 3.  Mildly distended colon which also contains fluid. No evidence of bowel obstruction.    DX-CHEST-PORTABLE (1 VIEW)    Result Date: 11/7/2022 11/7/2022 12:04 AM HISTORY/REASON FOR EXAM:  sepsis. TECHNIQUE/EXAM DESCRIPTION AND NUMBER OF VIEWS: Single portable view of the chest. COMPARISON:  11/5/2022. FINDINGS: LUNGS: Linear atelectasis in the right lower lung. No focal consolidation. Persistent interstitial edema. No effusions. PNEUMOTHORAX: None. LINES AND TUBES: Stable right IJ central catheter. MEDIASTINUM: Stable cardiac silhouette. MUSCULOSKELETAL STRUCTURES: Unchanged.     1.  Stable cardiomegaly and interstitial edema. 2.  Stable linear atelectasis in the right lower lung.     DX-CHEST-PORTABLE (1 VIEW)    Result Date: 11/5/2022 11/5/2022 8:17 AM HISTORY/REASON  FOR EXAM: Central line placement TECHNIQUE/EXAM DESCRIPTION AND NUMBER OF VIEWS: Single portable view of the chest. COMPARISON: 11/5/2022 FINDINGS: There is a right IJ central line present with the tip overlying the upper right atrium. No evidence of pneumothorax. There is interstitial edema. There is linear atelectasis within the right midlung. There is no pleural effusion. The heart is enlarged.     1.  Cardiomegaly. There is also interstitial edema. 2.  Right IJ central line present without evidence of complication. 3.  Linear atelectasis within the right lung base.    DX-CHEST-PORTABLE (1 VIEW)    Result Date: 11/5/2022 11/5/2022 3:40 AM HISTORY/REASON FOR EXAM:  Sepsis TECHNIQUE/EXAM DESCRIPTION AND NUMBER OF VIEWS: Single portable view of the chest. COMPARISON: 5/25/2021 FINDINGS: HEART: Not enlarged. LUNGS: Hazy opacity right lower lung. PLEURA: No effusion or pneumothorax.     1.  Hazy opacity at the right lower lung, similar to 5/25/2021 exam. Scarring versus pneumonia.    DX-ELBOW-COMPLETE 3+ LEFT    Result Date: 11/5/2022 11/5/2022 11:29 AM HISTORY/REASON FOR EXAM:  Pain/Deformity Following Trauma TECHNIQUE/EXAM DESCRIPTION AND NUMBER OF VIEWS:  3 views of the LEFT elbow. COMPARISON: None FINDINGS: There is no evidence of joint effusion. There is no evidence of displaced fracture or dislocation. There is no focal soft tissue swelling.     Normal elbow series.    US-EXTREMITY NON VASCULAR UNILATERAL LEFT    Result Date: 11/6/2022 11/6/2022 12:21 PM HISTORY/REASON FOR EXAM:  Left elbow swelling, redness, pain TECHNIQUE/EXAM DESCRIPTION AND NUMBER OF VIEWS: Ultrasound extremity nonvascular unilateral COMPARISON: X-ray yesterday FINDINGS: There is edema. No fluid collection identified.     Soft tissue edema in the area of concern about the left elbow without evidence of fluid collection/abscess.    EC-ECHOCARDIOGRAM COMPLETE W/ CONT    Result Date: 11/5/2022  Transthoracic Echo Report Echocardiography  Laboratory CONCLUSIONS Severely reduced left ventricular systolic function. The left ventricular ejection fraction is visually estimated to be 25- 30%, but may be underestimated due to tachycardia. Global hypokinesis with more pronounced hypokinesis of the anterior and anterolateral walls. Diastolic function is abnormal, but grade cannot be determined. The right ventricle is not well visualized, but appears dilated with reduced systolic function. Biatrial dilation. Mild to moderate tricuspid regurgitation. Dilated inferior vena cava without inspiratory collapse. EMELY FLORES Exam Date:         2022                    11:46 Exam Location:     Inpatient Priority:          Routine Ordering Physician:        LESLIE ARMENTA Referring Physician: Sonographer:               Sheron Baxter RDCS Age:    57     Gender:    M MRN:    [id number] :    [id number] BSA:    2.49   Ht (in):    72     Wt (lb):    290 Exam Type:     Complete, Contrast Indications:     Heart Failure, Systolic ICD Codes:       428.2 CPT Codes:       33283,  BP:   90     /   52     HR:   120 Technical Quality:       Fair MEASUREMENTS  (Male / Female) Normal Values 2D ECHO LV Diastolic Diameter PLAX        5.1 cm                4.2 - 5.9 / 3.9 - 5.3 cm LV Systolic Diameter PLAX         4.8 cm                2.1 - 4.0 cm IVS Diastolic Thickness           1 cm                  LVPW Diastolic Thickness          0.83 cm               LVOT Diameter                     2.2 cm                Estimated LV Ejection Fraction    30 %                  LV Ejection Fraction MOD BP       39.3 %                >= 55  % LV Ejection Fraction MOD 4C       41.2 %                LV Ejection Fraction MOD 2C       36.1 %                IVC Diameter                      2.5 cm                DOPPLER AV Peak Velocity                  1.2 m/s               AV Peak Gradient                  6 mmHg                AV Mean Gradient                  3.8 mmHg               LVOT Peak Velocity                0.85 m/s              AV Area Cont Eq vti               2.4 cm2               TV Peak E Velocity                0.51 m/s              TR Peak Velocity                  290 cm/s              * Indicates values subject to auto-interpretation LV EF:  30    % FINDINGS Left Ventricle Normal left ventricular chamber size. Normal left ventricular wall thickness. Severely reduced left ventricular systolic function. The left ventricular ejection fraction is visually estimated to be 25-30%, but may be underestimated due to tachycardia. Global hypokinesis with more pronounced hypokinesis of the anterior and anterolateral walls. Diastolic function is abnormal, but grade cannot be determined. 3 mL of contrast was used to enhance visualization of the endocardial border. Existing IV was used at the left elbow. Right Ventricle The right ventricle is not well visualized, but appears dilated with reduced systolic function. Estimated right ventricular systolic pressure is 50 mmHg Right Atrium The right atrium is not well visualized, but appears dilated. Dilated inferior vena cava without inspiratory collapse. Left Atrium The left atrium is not well visualized, but appears dilated. Mitral Valve The mitral valve is not well visualized. No stenosis or regurgitation seen. Aortic Valve The aortic valve is not well visualized. No stenosis or regurgitation seen. Tricuspid Valve No tricuspid stenosis. Mild to moderate tricuspid regurgitation. Pulmonic Valve Structurally normal pulmonic valve without significant stenosis or regurgitation. Pericardium Normal pericardium without effusion. Aorta Normal aortic root for body surface area. The ascending aorta diameter is 3.5 cm. Michael Acosta MD (Electronically Signed) Final Date:     05 November 2022                 14:02      Micro:  Results       Procedure Component Value Units Date/Time    CULTURE STOOL [[id number]] Collected: 11/05/22 0450    Order Status:  "Completed Specimen: Stool Updated: 11/08/22 1518     Significant Indicator NEG     Source STL     Site STOOL     Culture Result No enteric pathogens isolated.  NOTE:  Stool cultures are screened for Shiga Toxins 1 and 2,  Salmonella, Shigella, Campylobacter, Aeromonas,  Plesiomonas, and Vibrio.       EHEC Negative for Shiga Toxin 1 and 2.     Campylobactor Antigen --     Negative for Campylobacter Antigen.  Test results are to be used in conjunction with information  available from the patient clinical evaluation and other  diagnostic procedures.      Narrative:      For adult patients only; culture includes screen for  Campylobacter.  For adult patients only; culture includes screen for    BLOOD CULTURE [[id number]] Collected: 11/07/22 1102    Order Status: Completed Specimen: Blood from Peripheral Updated: 11/08/22 0731     Significant Indicator NEG     Source BLD     Site PERIPHERAL     Culture Result No Growth  Note: Blood cultures are incubated for 5 days and  are monitored continuously.Positive blood cultures  are called to the RN and reported as soon as  they are identified.      Narrative:      Per Hospital Policy: Only change Specimen Src: to \"Line\" if  specified by physician order.  Per Hospital Policy: Only change Specimen Src: to \"Line\" if    BLOOD CULTURE [[id number]] Collected: 11/07/22 1053    Order Status: Completed Specimen: Blood from Peripheral Updated: 11/08/22 0731     Significant Indicator NEG     Source BLD     Site PERIPHERAL     Culture Result No Growth  Note: Blood cultures are incubated for 5 days and  are monitored continuously.Positive blood cultures  are called to the RN and reported as soon as  they are identified.      Narrative:      Per Hospital Policy: Only change Specimen Src: to \"Line\" if  specified by physician order.  No site indicated    Blood Culture [[id number]]  (Abnormal)  (Susceptibility) Collected: 11/05/22 0414    Order Status: Completed Specimen: Blood from Peripheral " "Updated: 11/07/22 1114     Significant Indicator POS     Source BLD     Site PERIPHERAL     Culture Result Growth detected by Bactec instrument. 11/05/2022  15:42      Streptococcus pyogenes (Group A)  This isolate does NOT demonstrate inducible Clindamycin  resistance in vitro.      Narrative:      CALL  [place]  161 tel. [phone],  CALLED  161 tel. [phone] 11/06/2022, 12:01, RB PERF. RESULTS CALLED TO:  [id number]  1 of 2 for Blood Culture x 2 sites order. Per Hospital  Policy: Only change Specimen Src: to \"Line\" if specified by  physician order.  Left Hand    Susceptibility       Streptococcus pyogenes (group a) (1)       Antibiotic Interpretation Microscan   Method Status    Penicillin Sensitive 0.012 mcg/mL E-TEST Final    Cefotaxime Sensitive 0.012 mcg/mL E-TEST Final    Clindamycin Sensitive - mcg/mL E-TEST Final                       Blood Culture [[id number]]  (Abnormal) Collected: 11/05/22 0415    Order Status: Completed Specimen: Blood from Peripheral Updated: 11/07/22 1102     Significant Indicator POS     Source BLD     Site PERIPHERAL     Culture Result Growth detected by Bactec instrument. 11/05/2022  14:27      Streptococcus pyogenes (Group A)  See previous culture for sensitivity report.      Narrative:      CALL  [place]  161 tel. [phone],  CALLED  161 tel. [phone] 11/05/2022, 14:52, RB PERF. RESULTS CALLED  TO:[phone]  2 of 2 blood culture x2  Sites order. Per Hospital Policy:  Only change Specimen Src: to \"Line\" if specified by physician  order.  Right Wrist    E-Test [[id number]] Collected: 11/05/22 0414    Order Status: Completed Specimen: Other Updated: 11/07/22 1100     ETEST Sensitivity FINAL    Narrative:      161 tel. [phone] 11/06/2022, 12:01, RB PERF. RESULTS CALLED TO: [phone]  1 of 2 for Blood Culture x 2 sites order. Per Hospital  Policy: Only change Specimen Src: to \"Line\" if specified by  physician order.    Urine Culture (NEW) [[id number]] Collected: 11/05/22 0500    Order Status: " "Completed Specimen: Urine Updated: 11/07/22 0950     Significant Indicator NEG     Source UR     Site -     Culture Result No growth at 48 hours.    Narrative:      Indication for culture:->Evaluation for sepsis without a  clear source of infection  Indication for culture:->Evaluation for sepsis without a    EHEC(Shiga Toxin)Detection [[id number]] Collected: 11/05/22 0450    Order Status: Completed Specimen: Stool Updated: 11/06/22 1531     Significant Indicator NEG     Source STL     Site STOOL     EHEC Negative for Shiga Toxin 1 and 2.    Narrative:      For adult patients only; culture includes screen for  Campylobacter.  For adult patients only; culture includes screen for    MRSA By PCR (Amp) [[id number]] Collected: 11/05/22 1156    Order Status: Completed Specimen: Respirate from Nares Updated: 11/05/22 1433     MRSA by PCR POSITIVE    Narrative:      Collected By: [id number] AUGUSTIN BALL    C Diff by PCR rflx Toxin [[id number]] Collected: 11/05/22 0450    Order Status: Completed Specimen: Stool Updated: 11/05/22 0804     C Diff by PCR Negative     Comment: C. difficile NOT detected by PCR.  Treatment not indicated per guidelines.  Repeat testing not indicated within 7 days.          027-NAP1-BI Presumptive Negative     Comment: Presumptive 027/NAP1/BI target DNA sequences are NOT DETECTED.       Narrative:      Does this patient have risk factors for C-diff?->Yes  C-Diff Risk Factors->immunocompromising conditions    CoV-2, FLU A/B, and RSV by PCR (2-4 Hours CEPHEID) : Collect NP swab in VTM [[id number]] Collected: 11/05/22 0507    Order Status: Completed Specimen: Respirate Updated: 11/05/22 0606     Influenza virus A RNA Negative     Influenza virus B, PCR Negative     RSV, PCR Negative     SARS-CoV-2 by PCR NotDetected     Comment: PATIENTS: Important information regarding your results and instructions can  be found at [email or web address]   \"After your  Covid-19 Test\"    [facility] " providers: PLEASE REFER TO DE-ESCALATION AND RETESTING PROTOCOL  on [email or web address]    **The [employer] GeneXpert Xpress SARS-CoV-2 RT-PCR Test has been made  available for use under the Emergency Use Authorization (EUA) only.          SARS-CoV-2 Source NP Swab    Urinalysis [[id number]]  (Abnormal) Collected: 11/05/22 0500    Order Status: Completed Specimen: Urine Updated: 11/05/22 0507     Color DK Yellow     Character Turbid     Specific Gravity 1.017     Ph 5.0     Glucose 100 mg/dL      Ketones Trace mg/dL      Protein 30 mg/dL      Bilirubin Small     Urobilinogen, Urine 1.0     Nitrite Positive     Leukocyte Esterase Small     Occult Blood Negative     Micro Urine Req Microscopic    Narrative:      Indication for culture:->Evaluation for sepsis without a  clear source of infection            Assessment:  This is a 57-year-old male patient with type 2 diabetes, nonischemic cardiomyopathy, A. fib on amiodarone and Eliquis, prior hospitalization for bowel perforation, presented with 4 days of rigors, watery diarrhea, fevers has a chronic superficial left heel wound, was found to be in septic shock with acute renal failure and lactic acidosis, blood cultures positive for GPC in chains.  CT scan of the abdomen pelvis with no inflammation.  Chest x-ray with interstitial opacities.  TTE with EF of 25 to 30%, no obvious valvular vegetations were noted.    Plan:  -Blood cultures positive for Group A Strep  -Multiple allergies including Unasyn, Zosyn, patient prefers not to attempt challenge.  Continue IV Ancef 2 g every 8 hours  -Additional imaging of the left elbow pending MRI.  While there has been clinical improvement, white count still persistently elevated  -Repeat blood cultures x2 from 11/7 no growth to date    Discussed with internal medicine, Dr. Johnson

## 2022-11-10 NOTE — PROGRESS NOTES
Hospital Medicine Daily Progress Note    Date of Service  11/10/2022    Chief Complaint  Sebastián Castro is a 57 y.o. male admitted 11/5/2022 with septic shock.    Hospital Course  57 y.o. male with a past medical history of permanent A. fib on amiodarone and Eliquis, non-insulin-dependent diabetes mellitus type 2, nonischemic cardiomyopathy and prior hospitalization for bowel perforation status post expiratory laparotomy presents to the hospital with complaint of rigors on 11/5/2022.  On presentation patient reported that he has been having symptoms of diarrhea and rigors for the last 4 days which is associated with fevers.  He has been living at the skilled nursing facility for the past 1 year.  He reported generalized weakness and lightheadedness with visual changes on admission.  On admission he found to have septic shock and received IV fluid bolus and started him on IV antibiotics and he was placed on pressor due to hypotension with a target map of greater than 65.  Infectious disease consulted for positive blood cultures and the recommended CÉSAR if persistently positive blood cultures.  He was placed on cefepime and Flagyl.  There would be possible source of joint infection of left elbow and he underwent ultrasound did not show fluid collection and MRI ordered to evaluate for septic arthritis.  TTE did not show signs of endocarditis.  He found to have acute kidney injury it has been improving.  For his atrial fibrillation currently he is on rate control and he has been receiving Eliquis for anticoagulation.    Interval Problem Update  No acute events overnight.  MRI elbow still pending.  Continue ancef for strep bacteremia.  EF 30%, hx of non ischemic cardiomyopathy, he had left heart cath 2018 with no CAD seen. Unclear reason for his CM currently. Will treat medically, resume home metoprolol, lisinopril. Start spironolactone.  Follow up with cardiology as outpatient.  Stop hydrocortisone today.  SNF referral  placed.    I have discussed this patient's plan of care and discharge plan at IDT rounds today with Case Management, Nursing, Nursing leadership, and other members of the IDT team.    Consultants/Specialty  critical care and infectious disease    Code Status  Full Code    Disposition  Patient is not medically cleared for discharge.   Anticipate discharge to SNF.  I have placed the appropriate orders for post-discharge needs.    Review of Systems  Review of Systems   Constitutional:  Negative for chills and fever.   Eyes:  Negative for blurred vision and pain.   Respiratory:  Negative for cough and shortness of breath.    Cardiovascular:  Negative for chest pain, palpitations and leg swelling.   Gastrointestinal:  Negative for abdominal pain, nausea and vomiting.   Genitourinary:  Negative for dysuria and urgency.   Musculoskeletal:  Positive for joint pain. Negative for back pain.   Skin:  Negative for itching and rash.   Neurological:  Negative for dizziness, sensory change, focal weakness and headaches.   Psychiatric/Behavioral:  Negative for substance abuse. The patient does not have insomnia.       Physical Exam  Temp:  [36.6 °C (97.9 °F)-37.1 °C (98.8 °F)] 36.6 °C (97.9 °F)  Pulse:  [80-97] 80  Resp:  [16-18] 18  BP: (104-141)/(68-91) 115/73  SpO2:  [92 %] 92 %    Physical Exam  Constitutional:       General: He is not in acute distress.     Appearance: He is not ill-appearing.   HENT:      Head: Normocephalic and atraumatic.      Right Ear: External ear normal.      Left Ear: External ear normal.      Mouth/Throat:      Pharynx: No oropharyngeal exudate or posterior oropharyngeal erythema.   Eyes:      Extraocular Movements: Extraocular movements intact.      Pupils: Pupils are equal, round, and reactive to light.   Cardiovascular:      Rate and Rhythm: Normal rate and regular rhythm.      Pulses: Normal pulses.      Heart sounds: Normal heart sounds.   Pulmonary:      Effort: Pulmonary effort is normal. No  respiratory distress.      Breath sounds: Normal breath sounds. No wheezing or rales.   Abdominal:      General: Bowel sounds are normal. There is no distension.      Palpations: Abdomen is soft.      Tenderness: There is no abdominal tenderness. There is no guarding or rebound.   Musculoskeletal:         General: Swelling present. No tenderness.      Cervical back: Normal range of motion and neck supple.      Right lower leg: No edema.      Left lower leg: No edema.      Comments: Left elbow with some inflammation, no open wounds or drainage   Skin:     General: Skin is warm and dry.   Neurological:      General: No focal deficit present.      Mental Status: He is oriented to person, place, and time.      Cranial Nerves: No cranial nerve deficit.      Sensory: No sensory deficit.      Motor: No weakness.   Psychiatric:         Mood and Affect: Mood normal.         Behavior: Behavior normal.       Fluids    Intake/Output Summary (Last 24 hours) at 11/10/2022 1156  Last data filed at 11/10/2022 0854  Gross per 24 hour   Intake --   Output 1100 ml   Net -1100 ml       Laboratory  Recent Labs     11/08/22 0520 11/09/22 0344 11/10/22  0356   WBC 13.0* 13.4* 14.6*   RBC 3.84* 3.88* 3.82*   HEMOGLOBIN 11.3* 11.5* 11.2*   HEMATOCRIT 33.1* 34.3* 33.6*   MCV 86.2 88.4 88.0   MCH 29.4 29.6 29.3   MCHC 34.1 33.5* 33.3*   RDW 46.5 49.2 48.9   PLATELETCT 137* 160* 228   MPV 12.2 11.7 11.0     Recent Labs     11/08/22 0520 11/09/22 0344 11/10/22  0356   SODIUM 135 132* 134*   POTASSIUM 3.5* 3.8 3.7   CHLORIDE 107 106 107   CO2 18* 16* 19*   GLUCOSE 191* 185* 118*   BUN 63* 59* 50*   CREATININE 1.44* 1.26 1.18   CALCIUM 8.9 8.8 9.0                   Imaging  DX-CHEST-PORTABLE (1 VIEW)   Final Result      1.  Stable cardiomegaly and interstitial edema.   2.  Stable linear atelectasis in the right lower lung.         US-EXTREMITY NON VASCULAR UNILATERAL LEFT   Final Result      Soft tissue edema in the area of concern about the  left elbow without evidence of fluid collection/abscess.      EC-ECHOCARDIOGRAM COMPLETE W/ CONT   Final Result      DX-ELBOW-COMPLETE 3+ LEFT   Final Result      Normal elbow series.      CT-ABDOMEN-PELVIS W/O   Final Result      1.  No evidence of bowel obstruction or focal inflammatory change.      2.  Gallstones.      3.  Mildly distended colon which also contains fluid. No evidence of bowel obstruction.      DX-CHEST-PORTABLE (1 VIEW)   Final Result      1.  Cardiomegaly. There is also interstitial edema.      2.  Right IJ central line present without evidence of complication.      3.  Linear atelectasis within the right lung base.      DX-CHEST-PORTABLE (1 VIEW)   Final Result      1.  Hazy opacity at the right lower lung, similar to 5/25/2021 exam. Scarring versus pneumonia.      MR-ELBOW-WITH LEFT    (Results Pending)        Assessment/Plan  * Severe sepsis with septic shock (HCC)- (present on admission)  Assessment & Plan  Continue intravenous antimicrobial therapy  Shock has resolved - he is off vasopressor (levophed).  Sepsis is improving  Taper hydrocortisone, 50 mg IV daily    Swelling of left elbow joint  Assessment & Plan  Edema and Erythema of Left Elbow Joint - Native Joint  Significant Tenderness to palpation.   MRI pending, Consider Septic Arthiritis, will consult ortho pending results.  I changed MRI from without contrast to with contrast to evaluate for septic arthritis.      Acute on chronic heart failure (HCC)  Assessment & Plan  Hx of Nonischemic Cardiomyopathy  Lower Extremity Edema   BNP > 33,000  Echo from 2021 with EF of 55%  Repeat echo this admission revealed a EF of 25 to 30%  Metoprolol, lisinopril, lasix, spironolactone  Follow up with cardiology as outpatient    Streptococcal bacteremia  Assessment & Plan  Blood cultures positive for streptococcus, source unclear  Recent TTE negative for endocarditis  ID consulted and following          Continue IV Ancef 2g q 8 hrs   I discussed  plan of care with him.    Thrombocytopenia (HCC)- (present on admission)  Assessment & Plan  Stable current platelet count is 137.  No signs of active bleeding.    Gastroenteritis  Assessment & Plan  Started 4 days prior to Admission  CT Abdomen unremarkable.  Improving slightly.  Will continue to monitor  Antidiarrheals prn      Bacteremia due to Gram-positive bacteria  Assessment & Plan  Group A streptococcus Bacteremia, source unclear   Was in Septic Shock  Initially given Fluid administration per sepsis protocol.   Started on Pressor Levophed on 11/5 -> D/Cd on 11/8.    Started on Hydrocortisone 11/6 - Stress dose, hx of adrenal insuff.  Abdominal CT and Chest Xray unremarkable.  TTE without evidence of endocarditis  UA mild UTI, culture negative to date.  Suspected left elbow as infection source.    ID consulted, pt started on cefepime and flagyl empirically.     On 11/6 daptomycin was added for Enterococcus coverage.    On 11/7 cefepime, flagyl, & daptomycin D/Cd. Started IV Ancef 2 g every 8 hours. Rpt Blood Cx obtained.     - Taper hydrocortisone, 50 mg IV daily  - Continue IV Ancef 2 g q8 hrs  - Continue to Follow Rpt Blood Cxs, negative to date.   - MRI left elbow pending - suspecting Septic Arthritis. Will consult Ortho, pending results.     Permanent atrial fibrillation (HCC)- (present on admission)  Assessment & Plan  Currently in atrial fibrillation with RVR.   Rate controlled              Continue apixaban, 5 mg twice daily              Continue amiodarone  Will restart metoprolol as BP is stable.     Hypothyroidism- (present on admission)  Assessment & Plan  Continue Synthroid    Acute kidney injury (HCC)  Assessment & Plan  Initially Creatinine of 3.13 and GFR of 22  -Avoid Nephrotoxins and Renally Dose Meds   Resolved with sepsis improvement      Nonischemic cardiomyopathy (HCC)  Assessment & Plan  Unclear etiology of CM  Left heart cath in 2018 was negative for CAD  EF 30% here, worse than  prior  Continue home metoprolol, lisinopril; start aldactone  Follow up with cardiology as outpatient for further management    Obesity due to excess calories with serious comorbidity- (present on admission)  Assessment & Plan  Will need counseling when clinically appropriate    Hypokalemia- (present on admission)  Assessment & Plan  From diarrhea  Replace         VTE prophylaxis: therapeutic anticoagulation with eliquis    I have performed a physical exam and reviewed and updated ROS and Plan today (11/10/2022). In review of yesterday's note (11/9/2022), there are no changes except as documented above.

## 2022-11-10 NOTE — DISCHARGE PLANNING
Case Management Discharge Planning    Admission Date: 11/5/2022  GMLOS: 5  ALOS: 5    6-Clicks ADL Score: 15  6-Clicks Mobility Score: 9    PT and/or OT Eval ordered: Yes  Post-acute Referrals Ordered: No  Post-acute Choice Obtained: Yes  Has referral(s) been sent to post-acute provider:  Yes      Anticipated Discharge Dispo: Discharge Disposition: D/T to SNF with medicare cert w/planned hosp IP readmit (83)  Discharge Address: [facility] - pt is a long term resident    Medically Clear: No    Next Steps: f/u with pt and medical team to discuss dc needs and barriers.    Barriers to Discharge: Medical clearance

## 2022-11-10 NOTE — CARE PLAN
Problem: Knowledge Deficit - Standard  Goal: Patient and family/care givers will demonstrate understanding of plan of care, disease process/condition, diagnostic tests and medications  Outcome: Progressing     Problem: Fluid Volume  Goal: Fluid volume balance will be maintained  Outcome: Progressing     Problem: Urinary - Renal Perfusion  Goal: Ability to achieve and maintain adequate renal perfusion and functioning will improve  Outcome: Progressing     Problem: Respiratory  Goal: Patient will achieve/maintain optimum respiratory ventilation and gas exchange  Outcome: Progressing     Problem: Skin Integrity  Goal: Skin integrity is maintained or improved  Outcome: Progressing     Problem: Fall Risk  Goal: Patient will remain free from falls  Outcome: Progressing     Problem: Pain - Standard  Goal: Alleviation of pain or a reduction in pain to the patient’s comfort goal  Outcome: Progressing   The patient is Stable - Low risk of patient condition declining or worsening    Shift Goals  Clinical Goals: VSS  Patient Goals: Rest  Family Goals: VON    Progress made toward(s) clinical / shift goals:  Q2T and making sure pt has mepilex in place, Pt is clean and dry.

## 2022-11-10 NOTE — CARE PLAN
The patient is Stable - Low risk of patient condition declining or worsening    Shift Goals  Clinical Goals: monitor VS, pain control, iv atbx  Patient Goals: rest, pain control  Family Goals: n/a    Progress made toward(s) clinical / shift goals:      Problem: Knowledge Deficit - Standard  Goal: Patient and family/care givers will demonstrate understanding of plan of care, disease process/condition, diagnostic tests and medications  Outcome: Progressing     Problem: Fluid Volume  Goal: Fluid volume balance will be maintained  Outcome: Progressing     Problem: Respiratory  Goal: Patient will achieve/maintain optimum respiratory ventilation and gas exchange  Outcome: Progressing     Problem: Physical Regulation  Goal: Diagnostic test results will improve  Outcome: Progressing     Problem: Skin Integrity  Goal: Skin integrity is maintained or improved  Outcome: Progressing     Problem: Fall Risk  Goal: Patient will remain free from falls  Outcome: Progressing     Problem: Pain - Standard  Goal: Alleviation of pain or a reduction in pain to the patient’s comfort goal  Outcome: Progressing       Patient is not progressing towards the following goals:

## 2022-11-10 NOTE — DOCUMENTATION QUERY
[facility]                                                                       Query Response Note      PATIENT:               EMELY FLORES  ACCT #:                  [id number]  MRN:                     [id number]  :                      1965  ADMIT DATE:       2022 3:11 AM  DISCH DATE:          RESPONDING  PROVIDER #:        [id number]           QUERY TEXT:    On , Documentation in the Wound and Skin Care Evaluation indicates this patient has been identified as having and/or is being treated for Pressure Injury (s) of the following sites:    22 Pressure Injury Heel Left stage 3 -POA (Active)  22 Pressure Injury Sacrum; Coccyx Left bilateral coccyx sDTI- POA  (Active)    Please confirm the location/sites and the Present on Admission status of the Pressure Injury (s)        The patient's Clinical Indicators include:  NOTED     & : Left Heel/Sacrum Clinical pictures  : Wound and Skin Care Evaluation/Monitoring/Treatment  Pressure Injury Heel Left stage 3 POA (Active)  Pressure Injury Sacrum;Coccyx Left bilateral coccyx sDTI - POA  ..states he fell a week ago causing pain to the left elbow...Pt was also noted to have pressure injuries to the sacrum and left heel.   PT/OT   Resident of [facility]     Thank you,  Trena Valdovinos RN, CCS  Clinical Documentation Integrity  Connect via Voalte  Options provided:   -- Pressure Injury Left Heel  - POA; Pressure Injury Sacrum; Coccyx Left Bilateral - POA   -- Other explanation, (Pls specify)   -- Unable to determine      Query created by: Trena Valdovinos on 11/10/2022 7:38 AM    RESPONSE TEXT:    Pressure Injury Left Heel - POA; Pressure Injury Sacrum; Coccyx Left Bilateral - POA          Electronically signed by:  MORIS WOODARD MD 11/10/2022 1:36 PM

## 2022-11-11 ENCOUNTER — APPOINTMENT (OUTPATIENT)
Dept: RADIOLOGY | Facility: MEDICAL CENTER | Age: 57
DRG: 871 | End: 2022-11-11
Attending: STUDENT IN AN ORGANIZED HEALTH CARE EDUCATION/TRAINING PROGRAM
Payer: MEDICARE

## 2022-11-11 LAB
ANION GAP SERPL CALC-SCNC: 10 MMOL/L (ref 7–16)
BUN SERPL-MCNC: 39 MG/DL (ref 8–22)
CALCIUM SERPL-MCNC: 8.9 MG/DL (ref 8.5–10.5)
CHLORIDE SERPL-SCNC: 107 MMOL/L (ref 96–112)
CO2 SERPL-SCNC: 19 MMOL/L (ref 20–33)
CREAT SERPL-MCNC: 1.11 MG/DL (ref 0.5–1.4)
GFR SERPLBLD CREATININE-BSD FMLA CKD-EPI: 76 ML/MIN/1.73 M 2
GLUCOSE BLD STRIP.AUTO-MCNC: 112 MG/DL (ref 65–99)
GLUCOSE BLD STRIP.AUTO-MCNC: 117 MG/DL (ref 65–99)
GLUCOSE BLD STRIP.AUTO-MCNC: 119 MG/DL (ref 65–99)
GLUCOSE BLD STRIP.AUTO-MCNC: 125 MG/DL (ref 65–99)
GLUCOSE SERPL-MCNC: 109 MG/DL (ref 65–99)
POTASSIUM SERPL-SCNC: 3.7 MMOL/L (ref 3.6–5.5)
SODIUM SERPL-SCNC: 136 MMOL/L (ref 135–145)

## 2022-11-11 PROCEDURE — 700105 HCHG RX REV CODE 258: Performed by: INTERNAL MEDICINE

## 2022-11-11 PROCEDURE — A9270 NON-COVERED ITEM OR SERVICE: HCPCS | Performed by: STUDENT IN AN ORGANIZED HEALTH CARE EDUCATION/TRAINING PROGRAM

## 2022-11-11 PROCEDURE — 97530 THERAPEUTIC ACTIVITIES: CPT | Mod: CO

## 2022-11-11 PROCEDURE — A9270 NON-COVERED ITEM OR SERVICE: HCPCS | Performed by: INTERNAL MEDICINE

## 2022-11-11 PROCEDURE — 36415 COLL VENOUS BLD VENIPUNCTURE: CPT

## 2022-11-11 PROCEDURE — 73201 CT UPPER EXTREMITY W/DYE: CPT | Mod: LT

## 2022-11-11 PROCEDURE — 700117 HCHG RX CONTRAST REV CODE 255: Performed by: STUDENT IN AN ORGANIZED HEALTH CARE EDUCATION/TRAINING PROGRAM

## 2022-11-11 PROCEDURE — 700111 HCHG RX REV CODE 636 W/ 250 OVERRIDE (IP): Performed by: INTERNAL MEDICINE

## 2022-11-11 PROCEDURE — 700102 HCHG RX REV CODE 250 W/ 637 OVERRIDE(OP): Performed by: STUDENT IN AN ORGANIZED HEALTH CARE EDUCATION/TRAINING PROGRAM

## 2022-11-11 PROCEDURE — 82962 GLUCOSE BLOOD TEST: CPT | Mod: 91

## 2022-11-11 PROCEDURE — 770001 HCHG ROOM/CARE - MED/SURG/GYN PRIV*

## 2022-11-11 PROCEDURE — 80048 BASIC METABOLIC PNL TOTAL CA: CPT

## 2022-11-11 PROCEDURE — 700102 HCHG RX REV CODE 250 W/ 637 OVERRIDE(OP): Performed by: INTERNAL MEDICINE

## 2022-11-11 PROCEDURE — 99232 SBSQ HOSP IP/OBS MODERATE 35: CPT | Performed by: INTERNAL MEDICINE

## 2022-11-11 PROCEDURE — 97535 SELF CARE MNGMENT TRAINING: CPT | Mod: CO

## 2022-11-11 PROCEDURE — 99232 SBSQ HOSP IP/OBS MODERATE 35: CPT | Performed by: STUDENT IN AN ORGANIZED HEALTH CARE EDUCATION/TRAINING PROGRAM

## 2022-11-11 RX ADMIN — APIXABAN 5 MG: 5 TABLET, FILM COATED ORAL at 05:05

## 2022-11-11 RX ADMIN — METOPROLOL SUCCINATE 50 MG: 50 TABLET, EXTENDED RELEASE ORAL at 05:06

## 2022-11-11 RX ADMIN — NYSTATIN: 100000 POWDER TOPICAL at 05:08

## 2022-11-11 RX ADMIN — IOHEXOL 100 ML: 350 INJECTION, SOLUTION INTRAVENOUS at 16:55

## 2022-11-11 RX ADMIN — TRAZODONE HYDROCHLORIDE 50 MG: 50 TABLET ORAL at 20:22

## 2022-11-11 RX ADMIN — LEVOTHYROXINE SODIUM 150 MCG: 0.07 TABLET ORAL at 05:05

## 2022-11-11 RX ADMIN — NYSTATIN: 100000 POWDER TOPICAL at 17:55

## 2022-11-11 RX ADMIN — AMIODARONE HYDROCHLORIDE 400 MG: 200 TABLET ORAL at 17:55

## 2022-11-11 RX ADMIN — HYDROCODONE BITARTRATE AND ACETAMINOPHEN 2 TABLET: 5; 325 TABLET ORAL at 20:23

## 2022-11-11 RX ADMIN — CEFAZOLIN 2 G: 2 INJECTION, POWDER, FOR SOLUTION INTRAMUSCULAR; INTRAVENOUS at 23:08

## 2022-11-11 RX ADMIN — APIXABAN 5 MG: 5 TABLET, FILM COATED ORAL at 17:55

## 2022-11-11 RX ADMIN — AMIODARONE HYDROCHLORIDE 400 MG: 200 TABLET ORAL at 05:07

## 2022-11-11 RX ADMIN — CEFAZOLIN 2 G: 2 INJECTION, POWDER, FOR SOLUTION INTRAMUSCULAR; INTRAVENOUS at 05:12

## 2022-11-11 RX ADMIN — SPIRONOLACTONE 25 MG: 25 TABLET ORAL at 05:06

## 2022-11-11 RX ADMIN — LISINOPRIL 20 MG: 20 TABLET ORAL at 05:06

## 2022-11-11 RX ADMIN — CEFAZOLIN 2 G: 2 INJECTION, POWDER, FOR SOLUTION INTRAMUSCULAR; INTRAVENOUS at 14:57

## 2022-11-11 ASSESSMENT — CHA2DS2 SCORE
VASCULAR DISEASE: YES
AGE 65 TO 74: NO
PRIOR STROKE OR TIA OR THROMBOEMBOLISM: NO
DIABETES: YES
AGE 75 OR GREATER: NO
CHF OR LEFT VENTRICULAR DYSFUNCTION: YES
HYPERTENSION: NO
SEX: MALE
CHA2DS2 VASC SCORE: 3

## 2022-11-11 ASSESSMENT — COGNITIVE AND FUNCTIONAL STATUS - GENERAL
CLIMB 3 TO 5 STEPS WITH RAILING: A LOT
DRESSING REGULAR UPPER BODY CLOTHING: A LOT
EATING MEALS: A LITTLE
WALKING IN HOSPITAL ROOM: A LOT
TOILETING: A LOT
TURNING FROM BACK TO SIDE WHILE IN FLAT BAD: A LOT
MOBILITY SCORE: 12
SUGGESTED CMS G CODE MODIFIER MOBILITY: CL
PERSONAL GROOMING: A LITTLE
DAILY ACTIVITIY SCORE: 15
DRESSING REGULAR UPPER BODY CLOTHING: A LITTLE
DAILY ACTIVITIY SCORE: 15
SUGGESTED CMS G CODE MODIFIER DAILY ACTIVITY: CK
HELP NEEDED FOR BATHING: A LOT
STANDING UP FROM CHAIR USING ARMS: A LOT
TOILETING: A LOT
DRESSING REGULAR LOWER BODY CLOTHING: A LOT
DRESSING REGULAR LOWER BODY CLOTHING: A LITTLE
SUGGESTED CMS G CODE MODIFIER DAILY ACTIVITY: CK
PERSONAL GROOMING: A LITTLE
MOVING TO AND FROM BED TO CHAIR: A LOT
EATING MEALS: A LITTLE
MOVING FROM LYING ON BACK TO SITTING ON SIDE OF FLAT BED: A LOT
HELP NEEDED FOR BATHING: A LOT

## 2022-11-11 ASSESSMENT — ENCOUNTER SYMPTOMS
DIARRHEA: 0
NAUSEA: 0
FEVER: 0
DIZZINESS: 0
CHILLS: 0
SHORTNESS OF BREATH: 0
INSOMNIA: 0
VOMITING: 0
SENSORY CHANGE: 0
COUGH: 0
BLURRED VISION: 0
BACK PAIN: 0
EYE PAIN: 0
HEADACHES: 0
PALPITATIONS: 0
ABDOMINAL PAIN: 0
FOCAL WEAKNESS: 0

## 2022-11-11 ASSESSMENT — PAIN DESCRIPTION - PAIN TYPE
TYPE: ACUTE PAIN

## 2022-11-11 ASSESSMENT — LIFESTYLE VARIABLES: SUBSTANCE_ABUSE: 0

## 2022-11-11 NOTE — DIETARY
Nutrition Services: Update   Day 6 of admit.  Sebastián Castro is a 57 y.o. male with admitting DX of Septic shock (HCC) [A41.9, R65.21]    Pt is currently on L6: soft and bite sized CHO with thin liquids diet. PO intake has been <50% for majority of meals. Wt on 11/10: 140.9 kg via bed scale - Weight slightly up this admit though likely fluid related as noted Spironolactone is on Mar and with edema on lower extremities.     Attempted to meet with pt at bedside. 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 RD to add Boost supplements to help optimize nutrition.     Malnutrition Risk: At risk for moderate malnutrition due to poor PO intake     Recommendations/Plan:  Boost supplements BID per poor PO protocol    Encourage intake of >50% of meals and supplements   Document intake of all meals as % taken in ADL's to provide interdisciplinary communication across all shifts.   Monitor weight.  Nutrition rep will continue to see patient for ongoing meal and snack preferences.    RD following

## 2022-11-11 NOTE — PROGRESS NOTES
Received bedside report from RN , pt care assumed. VS stable, pt assessment complete. Pt A&Ox4, pt c/o 0/10  pain at this time. POC discussed with pt and verbalizes no questions. Pt denies any additional needs at this time. Bed locked and in lowest position, bed alarm on. Pt educated on fall risk and verbalized understanding, call light within reach, hourly rounding initiated.

## 2022-11-11 NOTE — PROGRESS NOTES
Hospital Medicine Daily Progress Note    Date of Service  11/11/2022    Chief Complaint  Sebastián Castro is a 57 y.o. male admitted 11/5/2022 with septic shock.    Hospital Course  57 y.o. male with a past medical history of permanent A. fib on amiodarone and Eliquis, non-insulin-dependent diabetes mellitus type 2, nonischemic cardiomyopathy and prior hospitalization for bowel perforation status post expiratory laparotomy presents to the hospital with complaint of rigors on 11/5/2022.  On presentation patient reported that he has been having symptoms of diarrhea and rigors for the last 4 days which is associated with fevers.  He has been living at the skilled nursing facility for the past 1 year.  He reported generalized weakness and lightheadedness with visual changes on admission.  On admission he found to have septic shock and received IV fluid bolus and started him on IV antibiotics and he was placed on pressor due to hypotension with a target map of greater than 65.  Infectious disease consulted for positive blood cultures and the recommended CÉSAR if persistently positive blood cultures.  He was placed on cefepime and Flagyl.  There would be possible source of joint infection of left elbow and he underwent ultrasound did not show fluid collection and MRI ordered to evaluate for septic arthritis.  TTE did not show signs of endocarditis.  He found to have acute kidney injury it has been improving.  For his atrial fibrillation currently he is on rate control and he has been receiving Eliquis for anticoagulation.    Interval Problem Update  No acute events overnight.  MRI elbow still pending. Will cancel MRI elbow, and order CT elbow given suspicion of possible bursitis as opposed to septic arthritis.  May still need drainage of infection if present.  Continue ancef for strep bacteremia.  Ultimately will need SNF when medically cleared for IV antibiotics, PT/OT.    I have discussed this patient's plan of care and  discharge plan at IDT rounds today with Case Management, Nursing, Nursing leadership, and other members of the IDT team.    Consultants/Specialty  critical care and infectious disease    Code Status  Full Code    Disposition  Patient is not medically cleared for discharge.   Anticipate discharge to SNF.  I have placed the appropriate orders for post-discharge needs.    Review of Systems  Review of Systems   Constitutional:  Negative for chills and fever.   Eyes:  Negative for blurred vision and pain.   Respiratory:  Negative for cough and shortness of breath.    Cardiovascular:  Negative for chest pain, palpitations and leg swelling.   Gastrointestinal:  Negative for abdominal pain, nausea and vomiting.   Genitourinary:  Negative for dysuria and urgency.   Musculoskeletal:  Positive for joint pain. Negative for back pain.   Skin:  Negative for itching and rash.   Neurological:  Negative for dizziness, sensory change, focal weakness and headaches.   Psychiatric/Behavioral:  Negative for substance abuse. The patient does not have insomnia.       Physical Exam  Temp:  [36.1 °C (97 °F)-36.9 °C (98.4 °F)] 36.1 °C (97 °F)  Pulse:  [73-94] 83  Resp:  [17-20] 19  BP: (105-123)/(62-77) 123/74  SpO2:  [89 %-93 %] 93 %    Physical Exam  Constitutional:       General: He is not in acute distress.     Appearance: He is not ill-appearing.   HENT:      Head: Normocephalic and atraumatic.      Right Ear: External ear normal.      Left Ear: External ear normal.      Mouth/Throat:      Pharynx: No oropharyngeal exudate or posterior oropharyngeal erythema.   Eyes:      Extraocular Movements: Extraocular movements intact.      Pupils: Pupils are equal, round, and reactive to light.   Cardiovascular:      Rate and Rhythm: Normal rate and regular rhythm.      Pulses: Normal pulses.      Heart sounds: Normal heart sounds.   Pulmonary:      Effort: Pulmonary effort is normal. No respiratory distress.      Breath sounds: Normal breath  sounds. No wheezing or rales.   Abdominal:      General: Bowel sounds are normal. There is no distension.      Palpations: Abdomen is soft.      Tenderness: There is no abdominal tenderness. There is no guarding or rebound.   Musculoskeletal:         General: Swelling present. No tenderness.      Cervical back: Normal range of motion and neck supple.      Right lower leg: No edema.      Left lower leg: No edema.      Comments: Left elbow with some inflammation, no open wounds or drainage   Skin:     General: Skin is warm and dry.   Neurological:      General: No focal deficit present.      Mental Status: He is oriented to person, place, and time.      Cranial Nerves: No cranial nerve deficit.      Sensory: No sensory deficit.      Motor: No weakness.   Psychiatric:         Mood and Affect: Mood normal.         Behavior: Behavior normal.       Fluids    Intake/Output Summary (Last 24 hours) at 11/11/2022 1226  Last data filed at 11/11/2022 1209  Gross per 24 hour   Intake 540 ml   Output 1650 ml   Net -1110 ml       Laboratory  Recent Labs     11/09/22  0344 11/10/22  0356   WBC 13.4* 14.6*   RBC 3.88* 3.82*   HEMOGLOBIN 11.5* 11.2*   HEMATOCRIT 34.3* 33.6*   MCV 88.4 88.0   MCH 29.6 29.3   MCHC 33.5* 33.3*   RDW 49.2 48.9   PLATELETCT 160* 228   MPV 11.7 11.0     Recent Labs     11/09/22  0344 11/10/22  0356 11/11/22  0222   SODIUM 132* 134* 136   POTASSIUM 3.8 3.7 3.7   CHLORIDE 106 107 107   CO2 16* 19* 19*   GLUCOSE 185* 118* 109*   BUN 59* 50* 39*   CREATININE 1.26 1.18 1.11   CALCIUM 8.8 9.0 8.9                   Imaging  DX-CHEST-PORTABLE (1 VIEW)   Final Result      1.  Stable cardiomegaly and interstitial edema.   2.  Stable linear atelectasis in the right lower lung.         US-EXTREMITY NON VASCULAR UNILATERAL LEFT   Final Result      Soft tissue edema in the area of concern about the left elbow without evidence of fluid collection/abscess.      EC-ECHOCARDIOGRAM COMPLETE W/ CONT   Final Result       DX-ELBOW-COMPLETE 3+ LEFT   Final Result      Normal elbow series.      CT-ABDOMEN-PELVIS W/O   Final Result      1.  No evidence of bowel obstruction or focal inflammatory change.      2.  Gallstones.      3.  Mildly distended colon which also contains fluid. No evidence of bowel obstruction.      DX-CHEST-PORTABLE (1 VIEW)   Final Result      1.  Cardiomegaly. There is also interstitial edema.      2.  Right IJ central line present without evidence of complication.      3.  Linear atelectasis within the right lung base.      DX-CHEST-PORTABLE (1 VIEW)   Final Result      1.  Hazy opacity at the right lower lung, similar to 5/25/2021 exam. Scarring versus pneumonia.      CT-ELBOW WITH & W/O LEFT    (Results Pending)        Assessment/Plan  * Severe sepsis with septic shock ([facility])- (present on admission)  Assessment & Plan  Continue intravenous antimicrobial therapy  Shock has resolved - he is off vasopressor (levophed).  Sepsis is improving  Taper hydrocortisone, 50 mg IV daily    Swelling of left elbow joint  Assessment & Plan  Edema and Erythema of Left Elbow Joint - Native Joint  Significant Tenderness to palpation.   MRI order discontinued, will order CT scan instead as patient may have bursitis/abscess as opposed to septic arthritis      Acute on chronic heart failure (HCC)  Assessment & Plan  Hx of Nonischemic Cardiomyopathy  Lower Extremity Edema   BNP > 33,000  Echo from 2021 with EF of 55%  Repeat echo this admission revealed a EF of 25 to 30%  Metoprolol, lisinopril, lasix, spironolactone  Follow up with cardiology as outpatient    Streptococcal bacteremia  Assessment & Plan  Blood cultures positive for streptococcus, source unclear  Recent TTE negative for endocarditis  ID consulted and following          Continue IV Ancef 2g q 8 hrs   I discussed plan of care with him.    Thrombocytopenia (HCC)- (present on admission)  Assessment & Plan  Stable current platelet count is 137.  No signs of active  bleeding.    Gastroenteritis  Assessment & Plan  Started 4 days prior to Admission  CT Abdomen unremarkable.  Improving slightly.  Will continue to monitor  Antidiarrheals prn      Bacteremia due to Gram-positive bacteria  Assessment & Plan  Group A streptococcus Bacteremia, source unclear   Was in Septic Shock  Initially given Fluid administration per sepsis protocol.   Started on Pressor Levophed on 11/5 -> D/Cd on 11/8.    Started on Hydrocortisone 11/6 - Stress dose, hx of adrenal insuff.  Abdominal CT and Chest Xray unremarkable.  TTE without evidence of endocarditis  UA mild UTI, culture negative to date.  Suspected left elbow as infection source.    ID consulted, pt started on cefepime and flagyl empirically.     On 11/6 daptomycin was added for Enterococcus coverage.    On 11/7 cefepime, flagyl, & daptomycin D/Cd. Started IV Ancef 2 g every 8 hours. Rpt Blood Cx obtained.     - Taper hydrocortisone, 50 mg IV daily  - Continue IV Ancef 2 g q8 hrs  - Continue to Follow Rpt Blood Cxs, negative to date.   - MRI left elbow pending, will cancel MRI and order CT elbow given suspicion for possible bursitis/abscess as opposed to septic arthritis    Permanent atrial fibrillation (HCC)- (present on admission)  Assessment & Plan  Currently in atrial fibrillation with RVR.   Rate controlled              Continue apixaban, 5 mg twice daily              Continue amiodarone  Will restart metoprolol as BP is stable.     Hypothyroidism- (present on admission)  Assessment & Plan  Continue Synthroid    Acute kidney injury (HCC)  Assessment & Plan  Initially Creatinine of 3.13 and GFR of 22  -Avoid Nephrotoxins and Renally Dose Meds   Resolved with sepsis improvement      Nonischemic cardiomyopathy (HCC)  Assessment & Plan  Unclear etiology of CM  Left heart cath in 2018 was negative for CAD  EF 30% here, worse than prior  Continue home metoprolol, lisinopril; start aldactone  Follow up with cardiology as outpatient for further  management    Obesity due to excess calories with serious comorbidity- (present on admission)  Assessment & Plan  Will need counseling when clinically appropriate    Hypokalemia- (present on admission)  Assessment & Plan  From diarrhea  Replace         VTE prophylaxis: therapeutic anticoagulation with eliquis    I have performed a physical exam and reviewed and updated ROS and Plan today (11/11/2022). In review of yesterday's note (11/10/2022), there are no changes except as documented above.

## 2022-11-11 NOTE — PROGRESS NOTES
MONITOR SUMMARY:     Rhythm: A-fib  Rate: 72 - 79  Ectopy: (o)PVC  Measurements: --/.10/--

## 2022-11-11 NOTE — PROGRESS NOTES
Superficial infectious Disease Progress Note    Author: Rai Rodriguez M.D. Date & Time of service: 2022  10:21 AM    Chief Complaint:  Follow-up for bacteremia    Interval History:   T-max 102.6 on , white count up to 15.1 today, on cefepime and Flagyl.  Continues to have left elbow pain   patient afebrile since antibiotic initiation, white count of 11,000, tolerated switch to Ancef.   patient remains afebrile, white count is 13.4, tolerating Ancef.  MRI remains pending.  Elbow pain much better  11/10 patient remains afebrile, count is still elevated, 14.6 today, MRI still pending   patient remains afebrile, CBC this morning, MRI is still pending.    Labs Reviewed and Medications Reviewed.    Review of Systems:  Review of Systems   Constitutional:  Negative for chills and fever.   Gastrointestinal:  Negative for abdominal pain, diarrhea, nausea and vomiting.   Musculoskeletal:  Positive for joint pain.   Skin:  Negative for itching and rash.   All other systems reviewed and are negative.    Hemodynamics:  Temp (24hrs), Av.7 °C (98 °F), Min:36.1 °C (97 °F), Max:36.9 °C (98.4 °F)  Temperature: 36.1 °C (97 °F), Monitored Temp: 36.6 °C (97.9 °F)  Pulse  Av.4  Min: 73  Max: 140   Blood Pressure: 106/62       Physical Exam:  Physical Exam  Vitals and nursing note reviewed.   Constitutional:       General: He is not in acute distress.     Appearance: He is ill-appearing.      Comments: Chronically ill-appearing   HENT:      Mouth/Throat:      Comments: Poor dentition  Eyes:      General: No scleral icterus.        Right eye: No discharge.         Left eye: No discharge.      Conjunctiva/sclera: Conjunctivae normal.   Cardiovascular:      Rate and Rhythm: Normal rate and regular rhythm.      Heart sounds: No murmur heard.  Pulmonary:      Effort: Pulmonary effort is normal. No respiratory distress.      Breath sounds: No stridor.   Abdominal:      General: Abdomen is flat. There is no  distension.      Tenderness: There is no abdominal tenderness.   Musculoskeletal:         General: Swelling and tenderness present.      Comments: Left posterior elbow swelling with erythema significantly improved, pain with passive motion also significantly improved -however, there is a persistent erythematous and slightly fluctuant area overlying the olecranon process   Skin:     Findings: Erythema present.   Neurological:      General: No focal deficit present.      Mental Status: He is alert and oriented to person, place, and time.   Psychiatric:      Comments: Flat affect       Meds:    Current Facility-Administered Medications:     lisinopril    metoprolol SR    tamsulosin    spironolactone    influenza vaccine quad    HYDROcodone-acetaminophen    amiodarone **FOLLOWED BY** [START ON 11/12/2022] amiodarone **FOLLOWED BY** [START ON 11/17/2022] amiodarone    ceFAZolin    insulin regular **AND** POC blood glucose manual result **AND** NOTIFY MD and PharmD **AND** Administer 20 grams of glucose (approximately 8 ounces of fruit juice) every 15 minutes PRN FSBG less than 70 mg/dL **AND** dextrose bolus    acetaminophen    levothyroxine    apixaban    traZODone    senna-docusate **AND** polyethylene glycol/lytes **AND** magnesium hydroxide **AND** bisacodyl    nystatin    Labs:  Recent Labs     11/09/22  0344 11/10/22  0356   WBC 13.4* 14.6*   RBC 3.88* 3.82*   HEMOGLOBIN 11.5* 11.2*   HEMATOCRIT 34.3* 33.6*   MCV 88.4 88.0   MCH 29.6 29.3   RDW 49.2 48.9   PLATELETCT 160* 228   MPV 11.7 11.0   NEUTSPOLYS 81.50*  --    LYMPHOCYTES 8.30*  --    MONOCYTES 4.40  --    EOSINOPHILS 0.10  --    BASOPHILS 0.70  --        Recent Labs     11/09/22  0344 11/10/22  0356 11/11/22  0222   SODIUM 132* 134* 136   POTASSIUM 3.8 3.7 3.7   CHLORIDE 106 107 107   CO2 16* 19* 19*   GLUCOSE 185* 118* 109*   BUN 59* 50* 39*       Recent Labs     11/09/22  0344 11/10/22  0356 11/11/22  0222   ALBUMIN 2.6* 2.6*  --    TBILIRUBIN 2.7* 2.8*   --    ALKPHOSPHAT 158* 146*  --    TOTPROTEIN 6.1 6.0  --    ALTSGPT 89* 61*  --    ASTSGOT 160* 141*  --    CREATININE 1.26 1.18 1.11         Imaging:  CT-ABDOMEN-PELVIS W/O    Result Date: 11/5/2022 11/5/2022 9:05 AM HISTORY/REASON FOR EXAM:  Abdominal distention. TECHNIQUE/EXAM DESCRIPTION: CT scan of the abdomen and pelvis without contrast. Noncontrast helical scanning was obtained from the diaphragmatic domes through the pubic symphysis. Low dose optimization technique was utilized for this CT exam including automated exposure control and adjustment of the mA and/or kV according to patient size. COMPARISON: None. FINDINGS: Lower Chest: There is bibasilar atelectasis.. Liver: Normal. Spleen: Calcified granulomas. Pancreas: Atrophic. Gallbladder: There are multiple gallstones. Biliary: Nondilated. Adrenal glands: Normal. Kidneys: Unremarkable without hydronephrosis. Bowel: The colon is mildly distended and contains fluid.. Lymph nodes: No adenopathy. Vasculature: Unremarkable. Peritoneum: Unremarkable without ascites. Musculoskeletal: There are chronic compression fractures of the spine.. Pelvis: No adenopathy or free fluid.     1.  No evidence of bowel obstruction or focal inflammatory change. 2.  Gallstones. 3.  Mildly distended colon which also contains fluid. No evidence of bowel obstruction.    DX-CHEST-PORTABLE (1 VIEW)    Result Date: 11/7/2022 11/7/2022 12:04 AM HISTORY/REASON FOR EXAM:  sepsis. TECHNIQUE/EXAM DESCRIPTION AND NUMBER OF VIEWS: Single portable view of the chest. COMPARISON:  11/5/2022. FINDINGS: LUNGS: Linear atelectasis in the right lower lung. No focal consolidation. Persistent interstitial edema. No effusions. PNEUMOTHORAX: None. LINES AND TUBES: Stable right IJ central catheter. MEDIASTINUM: Stable cardiac silhouette. MUSCULOSKELETAL STRUCTURES: Unchanged.     1.  Stable cardiomegaly and interstitial edema. 2.  Stable linear atelectasis in the right lower lung.     DX-CHEST-PORTABLE  (1 VIEW)    Result Date: 11/5/2022 11/5/2022 8:17 AM HISTORY/REASON FOR EXAM: Central line placement TECHNIQUE/EXAM DESCRIPTION AND NUMBER OF VIEWS: Single portable view of the chest. COMPARISON: 11/5/2022 FINDINGS: There is a right IJ central line present with the tip overlying the upper right atrium. No evidence of pneumothorax. There is interstitial edema. There is linear atelectasis within the right midlung. There is no pleural effusion. The heart is enlarged.     1.  Cardiomegaly. There is also interstitial edema. 2.  Right IJ central line present without evidence of complication. 3.  Linear atelectasis within the right lung base.    DX-CHEST-PORTABLE (1 VIEW)    Result Date: 11/5/2022 11/5/2022 3:40 AM HISTORY/REASON FOR EXAM:  Sepsis TECHNIQUE/EXAM DESCRIPTION AND NUMBER OF VIEWS: Single portable view of the chest. COMPARISON: 5/25/2021 FINDINGS: HEART: Not enlarged. LUNGS: Hazy opacity right lower lung. PLEURA: No effusion or pneumothorax.     1.  Hazy opacity at the right lower lung, similar to 5/25/2021 exam. Scarring versus pneumonia.    DX-ELBOW-COMPLETE 3+ LEFT    Result Date: 11/5/2022 11/5/2022 11:29 AM HISTORY/REASON FOR EXAM:  Pain/Deformity Following Trauma TECHNIQUE/EXAM DESCRIPTION AND NUMBER OF VIEWS:  3 views of the LEFT elbow. COMPARISON: None FINDINGS: There is no evidence of joint effusion. There is no evidence of displaced fracture or dislocation. There is no focal soft tissue swelling.     Normal elbow series.    US-EXTREMITY NON VASCULAR UNILATERAL LEFT    Result Date: 11/6/2022 11/6/2022 12:21 PM HISTORY/REASON FOR EXAM:  Left elbow swelling, redness, pain TECHNIQUE/EXAM DESCRIPTION AND NUMBER OF VIEWS: Ultrasound extremity nonvascular unilateral COMPARISON: X-ray yesterday FINDINGS: There is edema. No fluid collection identified.     Soft tissue edema in the area of concern about the left elbow without evidence of fluid collection/abscess.    EC-ECHOCARDIOGRAM COMPLETE W/  CONT    Result Date: 2022  Transthoracic Echo Report Echocardiography Laboratory CONCLUSIONS Severely reduced left ventricular systolic function. The left ventricular ejection fraction is visually estimated to be 25- 30%, but may be underestimated due to tachycardia. Global hypokinesis with more pronounced hypokinesis of the anterior and anterolateral walls. Diastolic function is abnormal, but grade cannot be determined. The right ventricle is not well visualized, but appears dilated with reduced systolic function. Biatrial dilation. Mild to moderate tricuspid regurgitation. Dilated inferior vena cava without inspiratory collapse. EMELY FLORES Exam Date:         2022                    11:46 Exam Location:     Inpatient Priority:          Routine Ordering Physician:        LESLIE ARMENTA Referring Physician: Sonographer:               Sheron Baxter RDCS Age:    57     Gender:    M MRN:    [id number] :    1965 BSA:    2.49   Ht (in):    72     Wt (lb):    290 Exam Type:     Complete, Contrast Indications:     Heart Failure, Systolic ICD Codes:       428.2 CPT Codes:       50193,  BP:   90     /   52     HR:   120 Technical Quality:       Fair MEASUREMENTS  (Male / Female) Normal Values 2D ECHO LV Diastolic Diameter PLAX        5.1 cm                4.2 - 5.9 / 3.9 - 5.3 cm LV Systolic Diameter PLAX         4.8 cm                2.1 - 4.0 cm IVS Diastolic Thickness           1 cm                  LVPW Diastolic Thickness          0.83 cm               LVOT Diameter                     2.2 cm                Estimated LV Ejection Fraction    30 %                  LV Ejection Fraction MOD BP       39.3 %                >= 55  % LV Ejection Fraction MOD 4C       41.2 %                LV Ejection Fraction MOD 2C       36.1 %                IVC Diameter                      2.5 cm                DOPPLER AV Peak Velocity                  1.2 m/s               AV Peak Gradient                  6  mmHg                AV Mean Gradient                  3.8 mmHg              LVOT Peak Velocity                0.85 m/s              AV Area Cont Eq vti               2.4 cm2               TV Peak E Velocity                0.51 m/s              TR Peak Velocity                  290 cm/s              * Indicates values subject to auto-interpretation LV EF:  30    % FINDINGS Left Ventricle Normal left ventricular chamber size. Normal left ventricular wall thickness. Severely reduced left ventricular systolic function. The left ventricular ejection fraction is visually estimated to be 25-30%, but may be underestimated due to tachycardia. Global hypokinesis with more pronounced hypokinesis of the anterior and anterolateral walls. Diastolic function is abnormal, but grade cannot be determined. 3 mL of contrast was used to enhance visualization of the endocardial border. Existing IV was used at the left elbow. Right Ventricle The right ventricle is not well visualized, but appears dilated with reduced systolic function. Estimated right ventricular systolic pressure is 50 mmHg Right Atrium The right atrium is not well visualized, but appears dilated. Dilated inferior vena cava without inspiratory collapse. Left Atrium The left atrium is not well visualized, but appears dilated. Mitral Valve The mitral valve is not well visualized. No stenosis or regurgitation seen. Aortic Valve The aortic valve is not well visualized. No stenosis or regurgitation seen. Tricuspid Valve No tricuspid stenosis. Mild to moderate tricuspid regurgitation. Pulmonic Valve Structurally normal pulmonic valve without significant stenosis or regurgitation. Pericardium Normal pericardium without effusion. Aorta Normal aortic root for body surface area. The ascending aorta diameter is 3.5 cm. Michael Acosta MD (Electronically Signed) Final Date:     05 November 2022                 14:02      Micro:  Results       Procedure Component Value Units  "Date/Time    CULTURE STOOL [[id number]] Collected: 11/05/22 0450    Order Status: Completed Specimen: Stool Updated: 11/08/22 1518     Significant Indicator NEG     Source STL     Site STOOL     Culture Result No enteric pathogens isolated.  NOTE:  Stool cultures are screened for Shiga Toxins 1 and 2,  Salmonella, Shigella, Campylobacter, Aeromonas,  Plesiomonas, and Vibrio.       EHEC Negative for Shiga Toxin 1 and 2.     Campylobactor Antigen --     Negative for Campylobacter Antigen.  Test results are to be used in conjunction with information  available from the patient clinical evaluation and other  diagnostic procedures.      Narrative:      For adult patients only; culture includes screen for  Campylobacter.  For adult patients only; culture includes screen for    BLOOD CULTURE [[id number]] Collected: 11/07/22 1102    Order Status: Completed Specimen: Blood from Peripheral Updated: 11/08/22 0731     Significant Indicator NEG     Source BLD     Site PERIPHERAL     Culture Result No Growth  Note: Blood cultures are incubated for 5 days and  are monitored continuously.Positive blood cultures  are called to the RN and reported as soon as  they are identified.      Narrative:      Per Hospital Policy: Only change Specimen Src: to \"Line\" if  specified by physician order.  Per Hospital Policy: Only change Specimen Src: to \"Line\" if    BLOOD CULTURE [[id number]] Collected: 11/07/22 1053    Order Status: Completed Specimen: Blood from Peripheral Updated: 11/08/22 0731     Significant Indicator NEG     Source BLD     Site PERIPHERAL     Culture Result No Growth  Note: Blood cultures are incubated for 5 days and  are monitored continuously.Positive blood cultures  are called to the RN and reported as soon as  they are identified.      Narrative:      Per Hospital Policy: Only change Specimen Src: to \"Line\" if  specified by physician order.  No site indicated    Blood Culture [[id number]]  (Abnormal)  (Susceptibility) " "Collected: 11/05/22 0414    Order Status: Completed Specimen: Blood from Peripheral Updated: 11/07/22 1114     Significant Indicator POS     Source BLD     Site PERIPHERAL     Culture Result Growth detected by Bactec instrument. 11/05/2022  15:42      Streptococcus pyogenes (Group A)  This isolate does NOT demonstrate inducible Clindamycin  resistance in vitro.      Narrative:      CALL  [place]  161 tel. [phone],  CALLED  161 tel. [phone] 11/06/2022, 12:01, RB PERF. RESULTS CALLED TO:  [id number]  1 of 2 for Blood Culture x 2 sites order. Per Hospital  Policy: Only change Specimen Src: to \"Line\" if specified by  physician order.  Left Hand    Susceptibility       Streptococcus pyogenes (group a) (1)       Antibiotic Interpretation Microscan   Method Status    Penicillin Sensitive 0.012 mcg/mL E-TEST Final    Cefotaxime Sensitive 0.012 mcg/mL E-TEST Final    Clindamycin Sensitive - mcg/mL E-TEST Final                       Blood Culture [[id number]]  (Abnormal) Collected: 11/05/22 0415    Order Status: Completed Specimen: Blood from Peripheral Updated: 11/07/22 1102     Significant Indicator POS     Source BLD     Site PERIPHERAL     Culture Result Growth detected by Bactec instrument. 11/05/2022  14:27      Streptococcus pyogenes (Group A)  See previous culture for sensitivity report.      Narrative:      CALL  [place]  161 tel. [phone],  CALLED  161 tel. [phone] 11/05/2022, 14:52, RB PERF. RESULTS CALLED  TO:[phone]  2 of 2 blood culture x2  Sites order. Per Hospital Policy:  Only change Specimen Src: to \"Line\" if specified by physician  order.  Right Wrist    E-Test [[id number]] Collected: 11/05/22 0414    Order Status: Completed Specimen: Other Updated: 11/07/22 1100     ETEST Sensitivity FINAL    Narrative:      161 tel. [phone] 11/06/2022, 12:01, RB PERF. RESULTS CALLED TO: [phone]  1 of 2 for Blood Culture x 2 sites order. Per Hospital  Policy: Only change Specimen Src: to \"Line\" if specified by  physician " order.    Urine Culture (NEW) [[id number]] Collected: 11/05/22 0500    Order Status: Completed Specimen: Urine Updated: 11/07/22 0950     Significant Indicator NEG     Source UR     Site -     Culture Result No growth at 48 hours.    Narrative:      Indication for culture:->Evaluation for sepsis without a  clear source of infection  Indication for culture:->Evaluation for sepsis without a    EHEC(Shiga Toxin)Detection [[id number]] Collected: 11/05/22 0450    Order Status: Completed Specimen: Stool Updated: 11/06/22 1531     Significant Indicator NEG     Source STL     Site STOOL     EHEC Negative for Shiga Toxin 1 and 2.    Narrative:      For adult patients only; culture includes screen for  Campylobacter.  For adult patients only; culture includes screen for    MRSA By PCR (Amp) [[id number]] Collected: 11/05/22 1156    Order Status: Completed Specimen: Respirate from Nares Updated: 11/05/22 1433     MRSA by PCR POSITIVE    Narrative:      Collected By: [id number] AMERICAYSANDREI KEVIN O    C Diff by PCR rflx Toxin [[id number]] Collected: 11/05/22 0450    Order Status: Completed Specimen: Stool Updated: 11/05/22 0804     C Diff by PCR Negative     Comment: C. difficile NOT detected by PCR.  Treatment not indicated per guidelines.  Repeat testing not indicated within 7 days.          027-NAP1-BI Presumptive Negative     Comment: Presumptive 027/NAP1/BI target DNA sequences are NOT DETECTED.       Narrative:      Does this patient have risk factors for C-diff?->Yes  C-Diff Risk Factors->immunocompromising conditions    CoV-2, FLU A/B, and RSV by PCR (2-4 Hours CEPHEID) : Collect NP swab in VTM [[id number]] Collected: 11/05/22 0507    Order Status: Completed Specimen: Respirate Updated: 11/05/22 0606     Influenza virus A RNA Negative     Influenza virus B, PCR Negative     RSV, PCR Negative     SARS-CoV-2 by PCR NotDetected     Comment: PATIENTS: Important information regarding your results and instructions can  be found at  "[email or web address]   \"After your  Covid-19 Test\"    [facility] providers: PLEASE REFER TO DE-ESCALATION AND RETESTING PROTOCOL  on [email or web address]    **The [employer] GeneXpert Xpress SARS-CoV-2 RT-PCR Test has been made  available for use under the Emergency Use Authorization (EUA) only.          SARS-CoV-2 Source NP Swab    Urinalysis [[id number]]  (Abnormal) Collected: 11/05/22 0500    Order Status: Completed Specimen: Urine Updated: 11/05/22 0555     Color DK Yellow     Character Turbid     Specific Gravity 1.017     Ph 5.0     Glucose 100 mg/dL      Ketones Trace mg/dL      Protein 30 mg/dL      Bilirubin Small     Urobilinogen, Urine 1.0     Nitrite Positive     Leukocyte Esterase Small     Occult Blood Negative     Micro Urine Req Microscopic    Narrative:      Indication for culture:->Evaluation for sepsis without a  clear source of infection            Assessment:  This is a 57-year-old male patient with type 2 diabetes, nonischemic cardiomyopathy, A. fib on amiodarone and Eliquis, prior hospitalization for bowel perforation, presented with 4 days of rigors, watery diarrhea, fevers has a chronic superficial left heel wound, was found to be in septic shock with acute renal failure and lactic acidosis, blood cultures positive for GPC in chains.  CT scan of the abdomen pelvis with no inflammation.  Chest x-ray with interstitial opacities.  TTE with EF of 25 to 30%, no obvious valvular vegetations were noted.    Plan:  -Blood cultures positive for Group A Strep  -Multiple allergies including Unasyn, Zosyn, patient prefers not to attempt challenge.  Continue IV Ancef 2 g every 8 hours  -Exam concerning for olecranon bursitis. Ortho has requested MRI and this is still pending. While there has been clinical improvement, white count still persistently elevated. Significant delay in MRI.  Primary team is going to escalate  -Repeat blood cultures x2 from 11/7 no growth to date    Discussed with " internal medicine, Dr. Wills

## 2022-11-11 NOTE — CARE PLAN
The patient is Stable - Low risk of patient condition declining or worsening    Shift Goals  Clinical Goals: Q2hr turns, wound care, monitor VS/labs  Patient Goals: Sleep  Family Goals: No family present at bedside    Progress made toward(s) clinical / shift goals:    Problem: Knowledge Deficit - Standard  Goal: Patient and family/care givers will demonstrate understanding of plan of care, disease process/condition, diagnostic tests and medications  Outcome: Progressing     Problem: Fluid Volume  Goal: Fluid volume balance will be maintained  Outcome: Progressing     Problem: Urinary - Renal Perfusion  Goal: Ability to achieve and maintain adequate renal perfusion and functioning will improve  Outcome: Progressing     Problem: Respiratory  Goal: Patient will achieve/maintain optimum respiratory ventilation and gas exchange  Outcome: Progressing     Problem: Skin Integrity  Goal: Skin integrity is maintained or improved  Outcome: Progressing     Problem: Fall Risk  Goal: Patient will remain free from falls  Outcome: Progressing     Problem: Pain - Standard  Goal: Alleviation of pain or a reduction in pain to the patient’s comfort goal  Outcome: Progressing       Patient is not progressing towards the following goals:

## 2022-11-11 NOTE — CARE PLAN
The patient is Watcher - Medium risk of patient condition declining or worsening    Shift Goals  Clinical Goals: q2 turns, wound care, monitor labs  Patient Goals: rest and comfort  Family Goals: No family present at bedside    Progress made toward(s) clinical / shift goals:      Patient is not progressing towards the following goals:

## 2022-11-11 NOTE — DISCHARGE PLANNING
Agency/Facility Name: [facility]  Spoke To: Glenna  Outcome: Pt is accepted back to SNF whenever they are medically clear. DPA to follow up once [facility]

## 2022-11-11 NOTE — THERAPY
"Occupational Therapy  Daily Treatment     Patient Name: Sebastián Castro  Age:  57 y.o., Sex:  male  Medical Record #: [id number]  Today's Date: 11/11/2022     Precautions  Precautions: Fall Risk    Assessment    Pr was seen for OT treatment. Pt gave good effort and is motivated in therapy.Pt seen for EOB ADL's and to increase activity tolerance seated base.  Pt did not want to attempt to stand this OT session due to fatigue. Pt required Mod A for all bed mobility. HOB elevated and use of bed rail with physical assist. Pt Mod A for supine to sit at EOB and Min A at times to stay seated at midline with activity. Min A for UB dressing with extended time to process  commands and sequencing task. H/G seated base with Min A /SBA  for set up and cues for sequencing tasks. Pt unable to attempt LB dressing saying his legs \"were too heavy and swollen\" which they are. Pt requested to get BTB due to \"severe fatigue\" but no c/o pain. Pt demo the need for extended time for most self care tasks.  Pt did require Total A to move up in bed with  2 person assist. RN updated on OT  treatment findings and recommendations.     Plan    Continue current treatment plan.    DC Equipment Recommendations: (P) Unable to determine at this time  Discharge Recommendations: (P) Recommend post-acute placement for additional occupational therapy services prior to discharge home    Subjective    \"I need help to get to the EOB\".  RN stated pt was \"Max A for rolling both left and right to place a bed pan under pt.\"    Objective       11/11/22 0959   Cognition    Cognition / Consciousness X   Level of Consciousness Alert   Safety Awareness Impaired   Sequencing Impaired   Comments pleasant, cooperative and motivated in therapy   Passive ROM Upper Body   Comments WFL   Active ROM Upper Body   Dominant Hand Right   Comments WFL  No  c/o pain in Left elbow this OT session with activity.   Strength Upper Body   Upper Body Strength  X   Gross Strength Generalized " Weakness, Equal Bilaterally.    Comments limited by weakness and low activity tolerance   Neuro-Muscular Treatments   Neuro-Muscular Treatments Weight Shift Left;Weight Shift Right;Verbal Cuing;Tactile Cuing;Postural Facilitation;Postural Changes;Anterior weight shift;Compensatory Strategies   Comments tolerated well. Min A to come back to midline seated base with fatigue.   Other Treatments   Other Treatments Provided Worked on energy conservation techs and psychosocial intervention was addressed..   Balance   Sitting Balance (Static) Fair   Sitting Balance (Dynamic) Fair   Weight Shift Sitting Fair   Weight Shift Standing   (NT)   Comments Seated EOB with activity.   Bed Mobility    Supine to Sit Moderate Assist   Sit to Supine Maximal Assist   Scooting Moderate Assist   Comments HOB elevated and used bedrail to assist. RN stated pt is Max A for rolling to place bed pan under him.   Activities of Daily Living   Eating Supervision  (post set up of tray)   Grooming Supervision/ SBA Seated  (H/G tasks seated base)   Bathing Minimal Assist  (UB bathing only.)   Upper Body Dressing Minimal Assist   Lower Body Dressing   (declined to attempt today .)   Toileting Maximal Assist   Skilled Intervention Verbal Cuing;Tactile Cuing;Sequencing;Compensatory Strategies   Comments Pt will need assist with most selfcare tasks at this time.   Functional Mobility   Comments Not assessed. Pt declined to stand. Pt seen EOB only for ADL's.   Activity Tolerance   Comments Pt seen seated EOB demo only fair activity tolerance. Easily fatigues.   Patient / Family Goals   Patient / Family Goal #1 to get stronger   Goal #1 Outcome Progressing as expected   Short Term Goals   Short Term Goal # 1 seated g/h with SPV for >2 min with BUEs   Goal Outcome # 1 Progressing as expected   Short Term Goal # 2 BSC txf with min A   Goal Outcome # 2 Progressing slower than expected   Short Term Goal # 3 UB dressing with SPV   Goal Outcome # 3 Progressing  as expected   Short Term Goal # 4 toileting with min A   Goal Outcome # 4 Goal not met   Anticipated Discharge Equipment and Recommendations   DC Equipment Recommendations Unable to determine at this time   Discharge Recommendations Recommend post-acute placement for additional occupational therapy services prior to discharge home   Interdisciplinary Plan of Care Collaboration   IDT Collaboration with  Nursing;Certified Nursing Assistant   Collaboration Comments RN updated

## 2022-11-12 LAB
ANISOCYTOSIS BLD QL SMEAR: ABNORMAL
BACTERIA BLD CULT: NORMAL
BACTERIA BLD CULT: NORMAL
BASOPHILS # BLD AUTO: 0 % (ref 0–1.8)
BASOPHILS # BLD: 0 K/UL (ref 0–0.12)
EOSINOPHIL # BLD AUTO: 0.45 K/UL (ref 0–0.51)
EOSINOPHIL NFR BLD: 2.7 % (ref 0–6.9)
ERYTHROCYTE [DISTWIDTH] IN BLOOD BY AUTOMATED COUNT: 52.8 FL (ref 35.9–50)
GLUCOSE BLD STRIP.AUTO-MCNC: 108 MG/DL (ref 65–99)
GLUCOSE BLD STRIP.AUTO-MCNC: 110 MG/DL (ref 65–99)
GLUCOSE BLD STRIP.AUTO-MCNC: 90 MG/DL (ref 65–99)
GLUCOSE BLD STRIP.AUTO-MCNC: 92 MG/DL (ref 65–99)
HCT VFR BLD AUTO: 33.8 % (ref 42–52)
HGB BLD-MCNC: 11 G/DL (ref 14–18)
LYMPHOCYTES # BLD AUTO: 1.45 K/UL (ref 1–4.8)
LYMPHOCYTES NFR BLD: 8.8 % (ref 22–41)
MACROCYTES BLD QL SMEAR: ABNORMAL
MANUAL DIFF BLD: NORMAL
MCH RBC QN AUTO: 29.8 PG (ref 27–33)
MCHC RBC AUTO-ENTMCNC: 32.5 G/DL (ref 33.7–35.3)
MCV RBC AUTO: 91.6 FL (ref 81.4–97.8)
METAMYELOCYTES NFR BLD MANUAL: 0.9 %
MICROCYTES BLD QL SMEAR: ABNORMAL
MONOCYTES # BLD AUTO: 0.87 K/UL (ref 0–0.85)
MONOCYTES NFR BLD AUTO: 5.3 % (ref 0–13.4)
MORPHOLOGY BLD-IMP: NORMAL
NEUTROPHILS # BLD AUTO: 13.58 K/UL (ref 1.82–7.42)
NEUTROPHILS NFR BLD: 81.4 % (ref 44–72)
NEUTS BAND NFR BLD MANUAL: 0.9 % (ref 0–10)
NRBC # BLD AUTO: 0 K/UL
NRBC BLD-RTO: 0 /100 WBC
PLATELET # BLD AUTO: 235 K/UL (ref 164–446)
PLATELET BLD QL SMEAR: NORMAL
PMV BLD AUTO: 10.4 FL (ref 9–12.9)
RBC # BLD AUTO: 3.69 M/UL (ref 4.7–6.1)
RBC BLD AUTO: PRESENT
SIGNIFICANT IND 70042: NORMAL
SIGNIFICANT IND 70042: NORMAL
SITE SITE: NORMAL
SITE SITE: NORMAL
SOURCE SOURCE: NORMAL
SOURCE SOURCE: NORMAL
WBC # BLD AUTO: 16.5 K/UL (ref 4.8–10.8)

## 2022-11-12 PROCEDURE — 99233 SBSQ HOSP IP/OBS HIGH 50: CPT | Performed by: INTERNAL MEDICINE

## 2022-11-12 PROCEDURE — 36415 COLL VENOUS BLD VENIPUNCTURE: CPT

## 2022-11-12 PROCEDURE — 700105 HCHG RX REV CODE 258: Performed by: INTERNAL MEDICINE

## 2022-11-12 PROCEDURE — 700102 HCHG RX REV CODE 250 W/ 637 OVERRIDE(OP): Performed by: INTERNAL MEDICINE

## 2022-11-12 PROCEDURE — 85025 COMPLETE CBC W/AUTO DIFF WBC: CPT

## 2022-11-12 PROCEDURE — 82962 GLUCOSE BLOOD TEST: CPT | Mod: 91

## 2022-11-12 PROCEDURE — 700102 HCHG RX REV CODE 250 W/ 637 OVERRIDE(OP): Performed by: STUDENT IN AN ORGANIZED HEALTH CARE EDUCATION/TRAINING PROGRAM

## 2022-11-12 PROCEDURE — A9270 NON-COVERED ITEM OR SERVICE: HCPCS | Performed by: STUDENT IN AN ORGANIZED HEALTH CARE EDUCATION/TRAINING PROGRAM

## 2022-11-12 PROCEDURE — 770001 HCHG ROOM/CARE - MED/SURG/GYN PRIV*

## 2022-11-12 PROCEDURE — A9270 NON-COVERED ITEM OR SERVICE: HCPCS | Performed by: INTERNAL MEDICINE

## 2022-11-12 PROCEDURE — 85007 BL SMEAR W/DIFF WBC COUNT: CPT

## 2022-11-12 PROCEDURE — 99232 SBSQ HOSP IP/OBS MODERATE 35: CPT | Performed by: STUDENT IN AN ORGANIZED HEALTH CARE EDUCATION/TRAINING PROGRAM

## 2022-11-12 PROCEDURE — 700111 HCHG RX REV CODE 636 W/ 250 OVERRIDE (IP): Performed by: INTERNAL MEDICINE

## 2022-11-12 RX ADMIN — TRAZODONE HYDROCHLORIDE 50 MG: 50 TABLET ORAL at 21:44

## 2022-11-12 RX ADMIN — LISINOPRIL 20 MG: 20 TABLET ORAL at 05:21

## 2022-11-12 RX ADMIN — TAMSULOSIN HYDROCHLORIDE 0.4 MG: 0.4 CAPSULE ORAL at 08:26

## 2022-11-12 RX ADMIN — LEVOTHYROXINE SODIUM 150 MCG: 0.07 TABLET ORAL at 05:20

## 2022-11-12 RX ADMIN — CEFAZOLIN 2 G: 2 INJECTION, POWDER, FOR SOLUTION INTRAMUSCULAR; INTRAVENOUS at 13:33

## 2022-11-12 RX ADMIN — AMIODARONE HYDROCHLORIDE 400 MG: 200 TABLET ORAL at 05:21

## 2022-11-12 RX ADMIN — CEFAZOLIN 2 G: 2 INJECTION, POWDER, FOR SOLUTION INTRAMUSCULAR; INTRAVENOUS at 05:25

## 2022-11-12 RX ADMIN — CEFAZOLIN 2 G: 2 INJECTION, POWDER, FOR SOLUTION INTRAMUSCULAR; INTRAVENOUS at 21:45

## 2022-11-12 RX ADMIN — SPIRONOLACTONE 25 MG: 25 TABLET ORAL at 05:21

## 2022-11-12 RX ADMIN — METOPROLOL SUCCINATE 50 MG: 50 TABLET, EXTENDED RELEASE ORAL at 05:21

## 2022-11-12 RX ADMIN — APIXABAN 5 MG: 5 TABLET, FILM COATED ORAL at 05:21

## 2022-11-12 RX ADMIN — NYSTATIN: 100000 POWDER TOPICAL at 05:26

## 2022-11-12 ASSESSMENT — ENCOUNTER SYMPTOMS
NAUSEA: 0
SHORTNESS OF BREATH: 0
PALPITATIONS: 0
DIZZINESS: 0
BLURRED VISION: 0
EYE PAIN: 0
ABDOMINAL PAIN: 0
FEVER: 0
INSOMNIA: 0
HEADACHES: 0
CHILLS: 0
COUGH: 0
SENSORY CHANGE: 0
FOCAL WEAKNESS: 0
BACK PAIN: 0
DIARRHEA: 0
VOMITING: 0

## 2022-11-12 ASSESSMENT — LIFESTYLE VARIABLES: SUBSTANCE_ABUSE: 0

## 2022-11-12 ASSESSMENT — PAIN DESCRIPTION - PAIN TYPE: TYPE: ACUTE PAIN

## 2022-11-12 NOTE — CARE PLAN
The patient is Watcher - Medium risk of patient condition declining or worsening    Shift Goals  Clinical Goals: Wound care, mobility, abx  Patient Goals: rest  Family Goals: VON    Progress made toward(s) clinical / shift goals:    Problem: Skin Integrity  Goal: Skin integrity is maintained or improved  Outcome: Progressing     Problem: Fall Risk  Goal: Patient will remain free from falls  Outcome: Progressing       Patient is not progressing towards the following goals:

## 2022-11-12 NOTE — PROGRESS NOTES
Hospital Medicine Daily Progress Note    Date of Service  11/12/2022    Chief Complaint  Sebastián Castro is a 57 y.o. male admitted 11/5/2022 with septic shock.    Hospital Course  57 y.o. male with a past medical history of permanent A. fib on amiodarone and Eliquis, non-insulin-dependent diabetes mellitus type 2, nonischemic cardiomyopathy and prior hospitalization for bowel perforation status post expiratory laparotomy presents to the hospital with complaint of rigors on 11/5/2022.  On presentation patient reported that he has been having symptoms of diarrhea and rigors for the last 4 days which is associated with fevers.  He has been living at the skilled nursing facility for the past 1 year.  He reported generalized weakness and lightheadedness with visual changes on admission.  On admission he found to have septic shock and received IV fluid bolus and started him on IV antibiotics and he was placed on pressor due to hypotension with a target map of greater than 65.  Infectious disease consulted for positive blood cultures and the recommended CÉSAR if persistently positive blood cultures.  He was placed on cefepime and Flagyl.  There would be possible source of joint infection of left elbow and he underwent ultrasound did not show fluid collection and MRI ordered to evaluate for septic arthritis.  TTE did not show signs of endocarditis.  He found to have acute kidney injury it has been improving.  For his atrial fibrillation currently he is on rate control and he has been receiving Eliquis for anticoagulation.    Interval Problem Update  No acute events overnight.  CT elbow shows cellulitis but no discrete abscess or septic arthritis.  Continue ancef for strep bacteremia.  Appreciate ID input for antibiotic duration.  Ultimately will need SNF when medically cleared for IV antibiotics, PT/OT.    I have discussed this patient's plan of care and discharge plan at IDT rounds today with Case Management, Nursing, Nursing  leadership, and other members of the IDT team.    Consultants/Specialty  critical care and infectious disease    Code Status  Full Code    Disposition  Patient is not medically cleared for discharge.   Anticipate discharge to SNF.  I have placed the appropriate orders for post-discharge needs.    Review of Systems  Review of Systems   Constitutional:  Negative for chills and fever.   Eyes:  Negative for blurred vision and pain.   Respiratory:  Negative for cough and shortness of breath.    Cardiovascular:  Negative for chest pain, palpitations and leg swelling.   Gastrointestinal:  Negative for abdominal pain, nausea and vomiting.   Genitourinary:  Negative for dysuria and urgency.   Musculoskeletal:  Positive for joint pain. Negative for back pain.   Skin:  Negative for itching and rash.   Neurological:  Negative for dizziness, sensory change, focal weakness and headaches.   Psychiatric/Behavioral:  Negative for substance abuse. The patient does not have insomnia.       Physical Exam  Temp:  [36.7 °C (98.1 °F)-36.9 °C (98.4 °F)] 36.7 °C (98.1 °F)  Pulse:  [67-83] 74  Resp:  [18-20] 18  BP: (107-139)/(62-91) 120/69  SpO2:  [92 %-93 %] 92 %    Physical Exam  Constitutional:       General: He is not in acute distress.     Appearance: He is not ill-appearing.   HENT:      Head: Normocephalic and atraumatic.      Right Ear: External ear normal.      Left Ear: External ear normal.      Mouth/Throat:      Pharynx: No oropharyngeal exudate or posterior oropharyngeal erythema.   Eyes:      Extraocular Movements: Extraocular movements intact.      Pupils: Pupils are equal, round, and reactive to light.   Cardiovascular:      Rate and Rhythm: Normal rate and regular rhythm.      Pulses: Normal pulses.      Heart sounds: Normal heart sounds.   Pulmonary:      Effort: Pulmonary effort is normal. No respiratory distress.      Breath sounds: Normal breath sounds. No wheezing or rales.   Abdominal:      General: Bowel sounds are  normal. There is no distension.      Palpations: Abdomen is soft.      Tenderness: There is no abdominal tenderness. There is no guarding or rebound.   Musculoskeletal:         General: Swelling present. No tenderness.      Cervical back: Normal range of motion and neck supple.      Right lower leg: No edema.      Left lower leg: No edema.      Comments: Left elbow with some inflammation, no open wounds or drainage   Skin:     General: Skin is warm and dry.   Neurological:      General: No focal deficit present.      Mental Status: He is oriented to person, place, and time.      Cranial Nerves: No cranial nerve deficit.      Sensory: No sensory deficit.      Motor: No weakness.   Psychiatric:         Mood and Affect: Mood normal.         Behavior: Behavior normal.       Fluids    Intake/Output Summary (Last 24 hours) at 11/12/2022 1035  Last data filed at 11/11/2022 2025  Gross per 24 hour   Intake 360 ml   Output 700 ml   Net -340 ml       Laboratory  Recent Labs     11/10/22  0356 11/12/22  0120   WBC 14.6* 16.5*   RBC 3.82* 3.69*   HEMOGLOBIN 11.2* 11.0*   HEMATOCRIT 33.6* 33.8*   MCV 88.0 91.6   MCH 29.3 29.8   MCHC 33.3* 32.5*   RDW 48.9 52.8*   PLATELETCT 228 235   MPV 11.0 10.4     Recent Labs     11/10/22  0356 11/11/22  0222   SODIUM 134* 136   POTASSIUM 3.7 3.7   CHLORIDE 107 107   CO2 19* 19*   GLUCOSE 118* 109*   BUN 50* 39*   CREATININE 1.18 1.11   CALCIUM 9.0 8.9                   Imaging  CT-ELBOW WITH LEFT   Final Result      1.  No bony abnormality indicative of osteomyelitis.   2.  Diffuse soft tissue swelling and induration about the elbow and proximal forearm most consistent with superficial and deep cellulitis.   3.  Note, CT is suboptimal for soft tissue detail and MRI of the elbow could be pursued if clinically indicated for management and would also be preferable for detection of early osteomyelitis.      DX-CHEST-PORTABLE (1 VIEW)   Final Result      1.  Stable cardiomegaly and interstitial  edema.   2.  Stable linear atelectasis in the right lower lung.         US-EXTREMITY NON VASCULAR UNILATERAL LEFT   Final Result      Soft tissue edema in the area of concern about the left elbow without evidence of fluid collection/abscess.      EC-ECHOCARDIOGRAM COMPLETE W/ CONT   Final Result      DX-ELBOW-COMPLETE 3+ LEFT   Final Result      Normal elbow series.      CT-ABDOMEN-PELVIS W/O   Final Result      1.  No evidence of bowel obstruction or focal inflammatory change.      2.  Gallstones.      3.  Mildly distended colon which also contains fluid. No evidence of bowel obstruction.      DX-CHEST-PORTABLE (1 VIEW)   Final Result      1.  Cardiomegaly. There is also interstitial edema.      2.  Right IJ central line present without evidence of complication.      3.  Linear atelectasis within the right lung base.      DX-CHEST-PORTABLE (1 VIEW)   Final Result      1.  Hazy opacity at the right lower lung, similar to 5/25/2021 exam. Scarring versus pneumonia.           Assessment/Plan  * Severe sepsis with septic shock (HCC)- (present on admission)  Assessment & Plan  Continue intravenous antimicrobial therapy  Shock has resolved - he is off vasopressor (levophed).  Sepsis is improving  Taper hydrocortisone, 50 mg IV daily    Swelling of left elbow joint  Assessment & Plan  Edema and Erythema of Left Elbow Joint - Native Joint  Significant Tenderness to palpation.   CT elbow without signs of abscess or septic arthritis, shows cellulitis; continue antibiotics  Improving with antibiotic treatment      Acute on chronic heart failure (HCC)  Assessment & Plan  Hx of Nonischemic Cardiomyopathy  Lower Extremity Edema   BNP > 33,000  Echo from 2021 with EF of 55%  Repeat echo this admission revealed a EF of 25 to 30%  Metoprolol, lisinopril, lasix, spironolactone  Follow up with cardiology as outpatient    Streptococcal bacteremia  Assessment & Plan  Blood cultures positive for streptococcus, source unclear  Recent TTE  negative for endocarditis  ID consulted and following          Continue IV Ancef 2g q 8 hrs   I discussed plan of care with him.    Thrombocytopenia (HCC)- (present on admission)  Assessment & Plan  Stable current platelet count is 137.  No signs of active bleeding.    Gastroenteritis  Assessment & Plan  Started 4 days prior to Admission  CT Abdomen unremarkable.  Improving slightly.  Will continue to monitor  Antidiarrheals prn      Bacteremia due to Gram-positive bacteria  Assessment & Plan  Group A streptococcus Bacteremia, source unclear   Was in Septic Shock  Initially given Fluid administration per sepsis protocol.   Started on Pressor Levophed on 11/5 -> D/Cd on 11/8.    Started on Hydrocortisone 11/6 - Stress dose, hx of adrenal insuff.  Abdominal CT and Chest Xray unremarkable.  TTE without evidence of endocarditis  UA mild UTI, culture negative to date.  Suspected left elbow as infection source.    ID consulted, pt started on cefepime and flagyl empirically.     On 11/6 daptomycin was added for Enterococcus coverage.    On 11/7 cefepime, flagyl, & daptomycin D/Cd. Started IV Ancef 2 g every 8 hours. Rpt Blood Cx obtained.     - Taper hydrocortisone, 50 mg IV daily  - Continue IV Ancef 2 g q8 hrs  - Continue to Follow Rpt Blood Cxs, negative to date.   CT elbow shows no discrete abscess or signs of septic arthritis; does show cellulitis    Permanent atrial fibrillation (HCC)- (present on admission)  Assessment & Plan  Currently in atrial fibrillation with RVR.   Rate controlled              Continue apixaban, 5 mg twice daily              Continue amiodarone  Will restart metoprolol as BP is stable.     Hypothyroidism- (present on admission)  Assessment & Plan  Continue Synthroid    Acute kidney injury (HCC)  Assessment & Plan  Initially Creatinine of 3.13 and GFR of 22  -Avoid Nephrotoxins and Renally Dose Meds   Resolved with sepsis improvement      Nonischemic cardiomyopathy (HCC)  Assessment &  Plan  Unclear etiology of CM  Left heart cath in 2018 was negative for CAD  EF 30% here, worse than prior  Continue home metoprolol, lisinopril; start aldactone  Follow up with cardiology as outpatient for further management    Obesity due to excess calories with serious comorbidity- (present on admission)  Assessment & Plan  Will need counseling when clinically appropriate    Hypokalemia- (present on admission)  Assessment & Plan  From diarrhea  Replace         VTE prophylaxis: therapeutic anticoagulation with eliquis    I have performed a physical exam and reviewed and updated ROS and Plan today (11/12/2022). In review of yesterday's note (11/11/2022), there are no changes except as documented above.

## 2022-11-12 NOTE — DISCHARGE PLANNING
Case Management Discharge Planning    Admission Date: 11/5/2022  GMLOS: 5  ALOS: 7    6-Clicks ADL Score: 15  6-Clicks Mobility Score: 12  PT and/or OT Eval ordered: Yes  Post-acute Referrals Ordered: Yes  Post-acute Choice Obtained: Yes  Has referral(s) been sent to post-acute provider:  Yes      Anticipated Discharge Dispo: Discharge Disposition: D/T to SNF with medicare cert w/planned hosp IP readmit (83)  Discharge Address: [facility]    DME Needed: No    Action(s) Taken: OTHER - Need to check [facility] bed days as patient has been a resident at [facility] and may not have any [facility] bed days left for SNF rehab.      Escalations Completed: Insurance  Need to verify [facility] bed days.    Medically Clear: No    Next Steps: Verify [facility] bed days for SNF rehab vs Acute rehab the transition back to residential status at [facility].    Barriers to Discharge: Medical clearance and confirmation of SNF bed days.    Is the patient up for discharge tomorrow: No

## 2022-11-12 NOTE — PROGRESS NOTES
Superficial infectious Disease Progress Note    Author: Rai Rodriguez M.D. Date & Time of service: 2022  12:41 PM    Chief Complaint:  Follow-up for bacteremia    Interval History:   T-max 102.6 on , white count up to 15.1 today, on cefepime and Flagyl.  Continues to have left elbow pain   patient afebrile since antibiotic initiation, white count of 11,000, tolerated switch to Ancef.   patient remains afebrile, white count is 13.4, tolerating Ancef.  MRI remains pending.  Elbow pain much better  11/10 patient remains afebrile, count is still elevated, 14.6 today, MRI still pending   patient remains afebrile, CBC this morning, MRI is still pending.   patient remains afebrile, count is up to 16.5 today, CT scan was done which was superficial and deep cellulitis.  Continues to have elbow pain    Labs Reviewed and Medications Reviewed.    Review of Systems:  Review of Systems   Constitutional:  Negative for chills and fever.   Gastrointestinal:  Negative for abdominal pain, diarrhea, nausea and vomiting.   Musculoskeletal:  Positive for joint pain.   Skin:  Negative for itching and rash.   All other systems reviewed and are negative.    Hemodynamics:  Temp (24hrs), Av.9 °C (98.4 °F), Min:36.7 °C (98.1 °F), Max:37 °C (98.6 °F)  Temperature: 37 °C (98.6 °F), Monitored Temp: 36.7 °C (98.1 °F)  Pulse  Av.5  Min: 65  Max: 140   Blood Pressure: 120/72       Physical Exam:  Physical Exam  Vitals and nursing note reviewed.   Constitutional:       General: He is not in acute distress.     Appearance: He is ill-appearing.      Comments: Chronically ill-appearing   HENT:      Mouth/Throat:      Comments: Poor dentition  Eyes:      General: No scleral icterus.        Right eye: No discharge.         Left eye: No discharge.      Conjunctiva/sclera: Conjunctivae normal.   Cardiovascular:      Rate and Rhythm: Normal rate and regular rhythm.      Heart sounds: No murmur heard.  Pulmonary:       Effort: Pulmonary effort is normal. No respiratory distress.      Breath sounds: No stridor.   Abdominal:      General: Abdomen is flat. There is no distension.      Tenderness: There is no abdominal tenderness.   Musculoskeletal:         General: Swelling and tenderness present.      Comments: Left posterior elbow swelling with erythema significantly improved, pain with passive motion also significantly improved -however, there is a persistent erythematous and slightly fluctuant area overlying the olecranon process   Skin:     Findings: Erythema present.   Neurological:      General: No focal deficit present.      Mental Status: He is alert and oriented to person, place, and time.   Psychiatric:      Comments: Flat affect       Meds:    Current Facility-Administered Medications:     lisinopril    metoprolol SR    tamsulosin    spironolactone    influenza vaccine quad    HYDROcodone-acetaminophen    [COMPLETED] amiodarone **FOLLOWED BY** amiodarone **FOLLOWED BY** [START ON 11/17/2022] amiodarone    ceFAZolin    insulin regular **AND** POC blood glucose manual result **AND** NOTIFY MD and PharmD **AND** Administer 20 grams of glucose (approximately 8 ounces of fruit juice) every 15 minutes PRN FSBG less than 70 mg/dL **AND** dextrose bolus    acetaminophen    levothyroxine    apixaban    traZODone    senna-docusate **AND** polyethylene glycol/lytes **AND** magnesium hydroxide **AND** bisacodyl    Labs:  Recent Labs     11/10/22  0356 11/12/22  0120   WBC 14.6* 16.5*   RBC 3.82* 3.69*   HEMOGLOBIN 11.2* 11.0*   HEMATOCRIT 33.6* 33.8*   MCV 88.0 91.6   MCH 29.3 29.8   RDW 48.9 52.8*   PLATELETCT 228 235   MPV 11.0 10.4   NEUTSPOLYS  --  81.40*   LYMPHOCYTES  --  8.80*   MONOCYTES  --  5.30   EOSINOPHILS  --  2.70   BASOPHILS  --  0.00   RBCMORPHOLO  --  Present       Recent Labs     11/10/22  0356 11/11/22  0222   SODIUM 134* 136   POTASSIUM 3.7 3.7   CHLORIDE 107 107   CO2 19* 19*   GLUCOSE 118* 109*   BUN 50*  39*       Recent Labs     11/10/22  0356 11/11/22  0222   ALBUMIN 2.6*  --    TBILIRUBIN 2.8*  --    ALKPHOSPHAT 146*  --    TOTPROTEIN 6.0  --    ALTSGPT 61*  --    ASTSGOT 141*  --    CREATININE 1.18 1.11         Imaging:  CT-ABDOMEN-PELVIS W/O    Result Date: 11/5/2022 11/5/2022 9:05 AM HISTORY/REASON FOR EXAM:  Abdominal distention. TECHNIQUE/EXAM DESCRIPTION: CT scan of the abdomen and pelvis without contrast. Noncontrast helical scanning was obtained from the diaphragmatic domes through the pubic symphysis. Low dose optimization technique was utilized for this CT exam including automated exposure control and adjustment of the mA and/or kV according to patient size. COMPARISON: None. FINDINGS: Lower Chest: There is bibasilar atelectasis.. Liver: Normal. Spleen: Calcified granulomas. Pancreas: Atrophic. Gallbladder: There are multiple gallstones. Biliary: Nondilated. Adrenal glands: Normal. Kidneys: Unremarkable without hydronephrosis. Bowel: The colon is mildly distended and contains fluid.. Lymph nodes: No adenopathy. Vasculature: Unremarkable. Peritoneum: Unremarkable without ascites. Musculoskeletal: There are chronic compression fractures of the spine.. Pelvis: No adenopathy or free fluid.     1.  No evidence of bowel obstruction or focal inflammatory change. 2.  Gallstones. 3.  Mildly distended colon which also contains fluid. No evidence of bowel obstruction.    DX-CHEST-PORTABLE (1 VIEW)    Result Date: 11/7/2022 11/7/2022 12:04 AM HISTORY/REASON FOR EXAM:  sepsis. TECHNIQUE/EXAM DESCRIPTION AND NUMBER OF VIEWS: Single portable view of the chest. COMPARISON:  11/5/2022. FINDINGS: LUNGS: Linear atelectasis in the right lower lung. No focal consolidation. Persistent interstitial edema. No effusions. PNEUMOTHORAX: None. LINES AND TUBES: Stable right IJ central catheter. MEDIASTINUM: Stable cardiac silhouette. MUSCULOSKELETAL STRUCTURES: Unchanged.     1.  Stable cardiomegaly and interstitial edema. 2.   Stable linear atelectasis in the right lower lung.     DX-CHEST-PORTABLE (1 VIEW)    Result Date: 11/5/2022 11/5/2022 8:17 AM HISTORY/REASON FOR EXAM: Central line placement TECHNIQUE/EXAM DESCRIPTION AND NUMBER OF VIEWS: Single portable view of the chest. COMPARISON: 11/5/2022 FINDINGS: There is a right IJ central line present with the tip overlying the upper right atrium. No evidence of pneumothorax. There is interstitial edema. There is linear atelectasis within the right midlung. There is no pleural effusion. The heart is enlarged.     1.  Cardiomegaly. There is also interstitial edema. 2.  Right IJ central line present without evidence of complication. 3.  Linear atelectasis within the right lung base.    DX-CHEST-PORTABLE (1 VIEW)    Result Date: 11/5/2022 11/5/2022 3:40 AM HISTORY/REASON FOR EXAM:  Sepsis TECHNIQUE/EXAM DESCRIPTION AND NUMBER OF VIEWS: Single portable view of the chest. COMPARISON: 5/25/2021 FINDINGS: HEART: Not enlarged. LUNGS: Hazy opacity right lower lung. PLEURA: No effusion or pneumothorax.     1.  Hazy opacity at the right lower lung, similar to 5/25/2021 exam. Scarring versus pneumonia.    DX-ELBOW-COMPLETE 3+ LEFT    Result Date: 11/5/2022 11/5/2022 11:29 AM HISTORY/REASON FOR EXAM:  Pain/Deformity Following Trauma TECHNIQUE/EXAM DESCRIPTION AND NUMBER OF VIEWS:  3 views of the LEFT elbow. COMPARISON: None FINDINGS: There is no evidence of joint effusion. There is no evidence of displaced fracture or dislocation. There is no focal soft tissue swelling.     Normal elbow series.    US-EXTREMITY NON VASCULAR UNILATERAL LEFT    Result Date: 11/6/2022 11/6/2022 12:21 PM HISTORY/REASON FOR EXAM:  Left elbow swelling, redness, pain TECHNIQUE/EXAM DESCRIPTION AND NUMBER OF VIEWS: Ultrasound extremity nonvascular unilateral COMPARISON: X-ray yesterday FINDINGS: There is edema. No fluid collection identified.     Soft tissue edema in the area of concern about the left elbow without evidence  of fluid collection/abscess.    EC-ECHOCARDIOGRAM COMPLETE W/ CONT    Result Date: 2022  Transthoracic Echo Report Echocardiography Laboratory CONCLUSIONS Severely reduced left ventricular systolic function. The left ventricular ejection fraction is visually estimated to be 25- 30%, but may be underestimated due to tachycardia. Global hypokinesis with more pronounced hypokinesis of the anterior and anterolateral walls. Diastolic function is abnormal, but grade cannot be determined. The right ventricle is not well visualized, but appears dilated with reduced systolic function. Biatrial dilation. Mild to moderate tricuspid regurgitation. Dilated inferior vena cava without inspiratory collapse. EMELY FLORES Exam Date:         2022                    11:46 Exam Location:     Inpatient Priority:          Routine Ordering Physician:        LESLIE ARMENTA Referring Physician: Sonographer:               Sheron Baxter RDCS Age:    57     Gender:    M MRN:    [id number] :    1965 BSA:    2.49   Ht (in):    72     Wt (lb):    290 Exam Type:     Complete, Contrast Indications:     Heart Failure, Systolic ICD Codes:       428.2 CPT Codes:       37996,  BP:   90     /   52     HR:   120 Technical Quality:       Fair MEASUREMENTS  (Male / Female) Normal Values 2D ECHO LV Diastolic Diameter PLAX        5.1 cm                4.2 - 5.9 / 3.9 - 5.3 cm LV Systolic Diameter PLAX         4.8 cm                2.1 - 4.0 cm IVS Diastolic Thickness           1 cm                  LVPW Diastolic Thickness          0.83 cm               LVOT Diameter                     2.2 cm                Estimated LV Ejection Fraction    30 %                  LV Ejection Fraction MOD BP       39.3 %                >= 55  % LV Ejection Fraction MOD 4C       41.2 %                LV Ejection Fraction MOD 2C       36.1 %                IVC Diameter                      2.5 cm                DOPPLER AV Peak Velocity                   1.2 m/s               AV Peak Gradient                  6 mmHg                AV Mean Gradient                  3.8 mmHg              LVOT Peak Velocity                0.85 m/s              AV Area Cont Eq vti               2.4 cm2               TV Peak E Velocity                0.51 m/s              TR Peak Velocity                  290 cm/s              * Indicates values subject to auto-interpretation LV EF:  30    % FINDINGS Left Ventricle Normal left ventricular chamber size. Normal left ventricular wall thickness. Severely reduced left ventricular systolic function. The left ventricular ejection fraction is visually estimated to be 25-30%, but may be underestimated due to tachycardia. Global hypokinesis with more pronounced hypokinesis of the anterior and anterolateral walls. Diastolic function is abnormal, but grade cannot be determined. 3 mL of contrast was used to enhance visualization of the endocardial border. Existing IV was used at the left elbow. Right Ventricle The right ventricle is not well visualized, but appears dilated with reduced systolic function. Estimated right ventricular systolic pressure is 50 mmHg Right Atrium The right atrium is not well visualized, but appears dilated. Dilated inferior vena cava without inspiratory collapse. Left Atrium The left atrium is not well visualized, but appears dilated. Mitral Valve The mitral valve is not well visualized. No stenosis or regurgitation seen. Aortic Valve The aortic valve is not well visualized. No stenosis or regurgitation seen. Tricuspid Valve No tricuspid stenosis. Mild to moderate tricuspid regurgitation. Pulmonic Valve Structurally normal pulmonic valve without significant stenosis or regurgitation. Pericardium Normal pericardium without effusion. Aorta Normal aortic root for body surface area. The ascending aorta diameter is 3.5 cm. Michael Acosta MD (Electronically Signed) Final Date:     05 November 2022                  "14:02      Micro:  Results       Procedure Component Value Units Date/Time    CULTURE STOOL [[id number]] Collected: 11/05/22 0450    Order Status: Completed Specimen: Stool Updated: 11/08/22 1518     Significant Indicator NEG     Source STL     Site STOOL     Culture Result No enteric pathogens isolated.  NOTE:  Stool cultures are screened for Shiga Toxins 1 and 2,  Salmonella, Shigella, Campylobacter, Aeromonas,  Plesiomonas, and Vibrio.       EHEC Negative for Shiga Toxin 1 and 2.     Campylobactor Antigen --     Negative for Campylobacter Antigen.  Test results are to be used in conjunction with information  available from the patient clinical evaluation and other  diagnostic procedures.      Narrative:      For adult patients only; culture includes screen for  Campylobacter.  For adult patients only; culture includes screen for    BLOOD CULTURE [[id number]] Collected: 11/07/22 1102    Order Status: Completed Specimen: Blood from Peripheral Updated: 11/08/22 0731     Significant Indicator NEG     Source BLD     Site PERIPHERAL     Culture Result No Growth  Note: Blood cultures are incubated for 5 days and  are monitored continuously.Positive blood cultures  are called to the RN and reported as soon as  they are identified.      Narrative:      Per Hospital Policy: Only change Specimen Src: to \"Line\" if  specified by physician order.  Per Hospital Policy: Only change Specimen Src: to \"Line\" if    BLOOD CULTURE [[id number]] Collected: 11/07/22 1053    Order Status: Completed Specimen: Blood from Peripheral Updated: 11/08/22 0731     Significant Indicator NEG     Source BLD     Site PERIPHERAL     Culture Result No Growth  Note: Blood cultures are incubated for 5 days and  are monitored continuously.Positive blood cultures  are called to the RN and reported as soon as  they are identified.      Narrative:      Per Hospital Policy: Only change Specimen Src: to \"Line\" if  specified by physician order.  No site indicated " "   Blood Culture [[id number]]  (Abnormal)  (Susceptibility) Collected: 11/05/22 0414    Order Status: Completed Specimen: Blood from Peripheral Updated: 11/07/22 1114     Significant Indicator POS     Source BLD     Site PERIPHERAL     Culture Result Growth detected by Bactec instrument. 11/05/2022  15:42      Streptococcus pyogenes (Group A)  This isolate does NOT demonstrate inducible Clindamycin  resistance in vitro.      Narrative:      CALL  [place]  161 tel. [phone],  CALLED  161 tel. [phone] 11/06/2022, 12:01, RB PERF. RESULTS CALLED TO:  [id number]  1 of 2 for Blood Culture x 2 sites order. Per Hospital  Policy: Only change Specimen Src: to \"Line\" if specified by  physician order.  Left Hand    Susceptibility       Streptococcus pyogenes (group a) (1)       Antibiotic Interpretation Microscan   Method Status    Penicillin Sensitive 0.012 mcg/mL E-TEST Final    Cefotaxime Sensitive 0.012 mcg/mL E-TEST Final    Clindamycin Sensitive - mcg/mL E-TEST Final                       Blood Culture [[id number]]  (Abnormal) Collected: 11/05/22 0415    Order Status: Completed Specimen: Blood from Peripheral Updated: 11/07/22 1102     Significant Indicator POS     Source BLD     Site PERIPHERAL     Culture Result Growth detected by Bactec instrument. 11/05/2022  14:27      Streptococcus pyogenes (Group A)  See previous culture for sensitivity report.      Narrative:      CALL  [place]  161 tel. [phone],  CALLED  161 tel. [phone] 11/05/2022, 14:52, RB PERF. RESULTS CALLED  TO:[id number]  2 of 2 blood culture x2  Sites order. Per Hospital Policy:  Only change Specimen Src: to \"Line\" if specified by physician  order.  Right Wrist    E-Test [[id number]] Collected: 11/05/22 0414    Order Status: Completed Specimen: Other Updated: 11/07/22 1100     ETEST Sensitivity FINAL    Narrative:      161 tel. [phone] 11/06/2022, 12:01, RB PERF. RESULTS CALLED TO: [id number]  1 of 2 for Blood Culture x 2 sites order. Per Hospital  Policy: Only " "change Specimen Src: to \"Line\" if specified by  physician order.    Urine Culture (NEW) [[id number]] Collected: 11/05/22 0500    Order Status: Completed Specimen: Urine Updated: 11/07/22 0950     Significant Indicator NEG     Source UR     Site -     Culture Result No growth at 48 hours.    Narrative:      Indication for culture:->Evaluation for sepsis without a  clear source of infection  Indication for culture:->Evaluation for sepsis without a    EHEC(Shiga Toxin)Detection [[id number]] Collected: 11/05/22 0450    Order Status: Completed Specimen: Stool Updated: 11/06/22 1531     Significant Indicator NEG     Source STL     Site STOOL     EHEC Negative for Shiga Toxin 1 and 2.    Narrative:      For adult patients only; culture includes screen for  Campylobacter.  For adult patients only; culture includes screen for    MRSA By PCR (Amp) [[id number]] Collected: 11/05/22 1156    Order Status: Completed Specimen: Respirate from Nares Updated: 11/05/22 1433     MRSA by PCR POSITIVE    Narrative:      Collected By: [id number] AUGUSTIN BALL            Assessment:  This is a 57-year-old male patient with type 2 diabetes, nonischemic cardiomyopathy, A. fib on amiodarone and Eliquis, prior hospitalization for bowel perforation, presented with 4 days of rigors, watery diarrhea, fevers has a chronic superficial left heel wound, was found to be in septic shock with acute renal failure and lactic acidosis, blood cultures positive for GPC in chains.  CT scan of the abdomen pelvis with no inflammation.  Chest x-ray with interstitial opacities.  TTE with EF of 25 to 30%, no obvious valvular vegetations were noted.    Plan:  -Blood cultures positive for Group A Strep  -Multiple allergies including Unasyn, Zosyn, patient prefers not to attempt challenge.  Continue IV Ancef 2 g every 8 hours  -Exam concerning for olecranon bursitis.  MRI was not done for about 4 days so CT scan was obtained which showed superficial and deep " cellulitis.  White count continues to trend up.  Today is day 8 of antibiotics. Recommend orthopedic involvement and consideration of I&D   -Repeat blood cultures x2 from 11/7 no growth to date    Discussed with internal medicine, Dr. Wills

## 2022-11-12 NOTE — PROGRESS NOTES
Report received from Taisha Lopez. Assumed pt care. Pt sleeping comfortably. Fall precautions in place, call light and belongings within reach, bed in lowest position. No signs of distress.

## 2022-11-12 NOTE — PROGRESS NOTES
Assessment completed. Pt A&O. Respirations are even and unlabored on RA. POC updated. Pt educated on room and call light, pt verbalized understanding. Pt denies pain at this time. Monitors applied, call light within reach. Needs met.   Adult

## 2022-11-13 ENCOUNTER — APPOINTMENT (OUTPATIENT)
Dept: RADIOLOGY | Facility: MEDICAL CENTER | Age: 57
DRG: 871 | End: 2022-11-13
Attending: STUDENT IN AN ORGANIZED HEALTH CARE EDUCATION/TRAINING PROGRAM
Payer: MEDICARE

## 2022-11-13 LAB
BASOPHILS # BLD AUTO: 0.8 % (ref 0–1.8)
BASOPHILS # BLD: 0.12 K/UL (ref 0–0.12)
EOSINOPHIL # BLD AUTO: 0 K/UL (ref 0–0.51)
EOSINOPHIL NFR BLD: 0 % (ref 0–6.9)
ERYTHROCYTE [DISTWIDTH] IN BLOOD BY AUTOMATED COUNT: 52.6 FL (ref 35.9–50)
GLUCOSE BLD STRIP.AUTO-MCNC: 90 MG/DL (ref 65–99)
GLUCOSE BLD STRIP.AUTO-MCNC: 94 MG/DL (ref 65–99)
GLUCOSE BLD STRIP.AUTO-MCNC: 95 MG/DL (ref 65–99)
HCT VFR BLD AUTO: 34.5 % (ref 42–52)
HGB BLD-MCNC: 11.4 G/DL (ref 14–18)
LYMPHOCYTES # BLD AUTO: 0.76 K/UL (ref 1–4.8)
LYMPHOCYTES NFR BLD: 5 % (ref 22–41)
MANUAL DIFF BLD: NORMAL
MCH RBC QN AUTO: 30.1 PG (ref 27–33)
MCHC RBC AUTO-ENTMCNC: 33 G/DL (ref 33.7–35.3)
MCV RBC AUTO: 91 FL (ref 81.4–97.8)
METAMYELOCYTES NFR BLD MANUAL: 1.7 %
MONOCYTES # BLD AUTO: 0.63 K/UL (ref 0–0.85)
MONOCYTES NFR BLD AUTO: 4.2 % (ref 0–13.4)
MORPHOLOGY BLD-IMP: NORMAL
MYELOCYTES NFR BLD MANUAL: 2.5 %
NEUTROPHILS # BLD AUTO: 12.96 K/UL (ref 1.82–7.42)
NEUTROPHILS NFR BLD: 79.1 % (ref 44–72)
NEUTS BAND NFR BLD MANUAL: 6.7 % (ref 0–10)
NRBC # BLD AUTO: 0 K/UL
NRBC BLD-RTO: 0 /100 WBC
PLATELET # BLD AUTO: 266 K/UL (ref 164–446)
PLATELET BLD QL SMEAR: NORMAL
PMV BLD AUTO: 10.5 FL (ref 9–12.9)
POIKILOCYTOSIS BLD QL SMEAR: NORMAL
RBC # BLD AUTO: 3.79 M/UL (ref 4.7–6.1)
RBC BLD AUTO: PRESENT
TARGETS BLD QL SMEAR: NORMAL
WBC # BLD AUTO: 15.1 K/UL (ref 4.8–10.8)

## 2022-11-13 PROCEDURE — 700102 HCHG RX REV CODE 250 W/ 637 OVERRIDE(OP): Performed by: STUDENT IN AN ORGANIZED HEALTH CARE EDUCATION/TRAINING PROGRAM

## 2022-11-13 PROCEDURE — 700102 HCHG RX REV CODE 250 W/ 637 OVERRIDE(OP): Performed by: INTERNAL MEDICINE

## 2022-11-13 PROCEDURE — 73223 MRI JOINT UPR EXTR W/O&W/DYE: CPT | Mod: LT

## 2022-11-13 PROCEDURE — 99232 SBSQ HOSP IP/OBS MODERATE 35: CPT | Performed by: STUDENT IN AN ORGANIZED HEALTH CARE EDUCATION/TRAINING PROGRAM

## 2022-11-13 PROCEDURE — 700105 HCHG RX REV CODE 258: Performed by: INTERNAL MEDICINE

## 2022-11-13 PROCEDURE — A9270 NON-COVERED ITEM OR SERVICE: HCPCS | Performed by: STUDENT IN AN ORGANIZED HEALTH CARE EDUCATION/TRAINING PROGRAM

## 2022-11-13 PROCEDURE — 700117 HCHG RX CONTRAST REV CODE 255: Performed by: STUDENT IN AN ORGANIZED HEALTH CARE EDUCATION/TRAINING PROGRAM

## 2022-11-13 PROCEDURE — A9576 INJ PROHANCE MULTIPACK: HCPCS | Performed by: STUDENT IN AN ORGANIZED HEALTH CARE EDUCATION/TRAINING PROGRAM

## 2022-11-13 PROCEDURE — 85007 BL SMEAR W/DIFF WBC COUNT: CPT

## 2022-11-13 PROCEDURE — A9270 NON-COVERED ITEM OR SERVICE: HCPCS | Performed by: INTERNAL MEDICINE

## 2022-11-13 PROCEDURE — 770001 HCHG ROOM/CARE - MED/SURG/GYN PRIV*

## 2022-11-13 PROCEDURE — 99232 SBSQ HOSP IP/OBS MODERATE 35: CPT | Performed by: INTERNAL MEDICINE

## 2022-11-13 PROCEDURE — 700111 HCHG RX REV CODE 636 W/ 250 OVERRIDE (IP): Performed by: INTERNAL MEDICINE

## 2022-11-13 PROCEDURE — 36415 COLL VENOUS BLD VENIPUNCTURE: CPT

## 2022-11-13 PROCEDURE — 82962 GLUCOSE BLOOD TEST: CPT

## 2022-11-13 PROCEDURE — 0R9M3ZZ DRAINAGE OF LEFT ELBOW JOINT, PERCUTANEOUS APPROACH: ICD-10-PCS | Performed by: ORTHOPAEDIC SURGERY

## 2022-11-13 PROCEDURE — 85025 COMPLETE CBC W/AUTO DIFF WBC: CPT

## 2022-11-13 RX ADMIN — METOPROLOL SUCCINATE 50 MG: 50 TABLET, EXTENDED RELEASE ORAL at 05:43

## 2022-11-13 RX ADMIN — TRAZODONE HYDROCHLORIDE 50 MG: 50 TABLET ORAL at 21:35

## 2022-11-13 RX ADMIN — CEFAZOLIN 2 G: 2 INJECTION, POWDER, FOR SOLUTION INTRAMUSCULAR; INTRAVENOUS at 05:47

## 2022-11-13 RX ADMIN — CEFAZOLIN 2 G: 2 INJECTION, POWDER, FOR SOLUTION INTRAMUSCULAR; INTRAVENOUS at 14:23

## 2022-11-13 RX ADMIN — AMIODARONE HYDROCHLORIDE 400 MG: 200 TABLET ORAL at 05:43

## 2022-11-13 RX ADMIN — HYDROCODONE BITARTRATE AND ACETAMINOPHEN 2 TABLET: 5; 325 TABLET ORAL at 21:39

## 2022-11-13 RX ADMIN — GADOTERIDOL 20 ML: 279.3 INJECTION, SOLUTION INTRAVENOUS at 09:59

## 2022-11-13 RX ADMIN — LISINOPRIL 20 MG: 20 TABLET ORAL at 05:42

## 2022-11-13 RX ADMIN — HYDROCODONE BITARTRATE AND ACETAMINOPHEN 2 TABLET: 5; 325 TABLET ORAL at 14:35

## 2022-11-13 RX ADMIN — CEFAZOLIN 2 G: 2 INJECTION, POWDER, FOR SOLUTION INTRAMUSCULAR; INTRAVENOUS at 21:42

## 2022-11-13 RX ADMIN — TAMSULOSIN HYDROCHLORIDE 0.4 MG: 0.4 CAPSULE ORAL at 08:37

## 2022-11-13 RX ADMIN — LEVOTHYROXINE SODIUM 150 MCG: 0.07 TABLET ORAL at 05:43

## 2022-11-13 RX ADMIN — SPIRONOLACTONE 25 MG: 25 TABLET ORAL at 05:43

## 2022-11-13 ASSESSMENT — ENCOUNTER SYMPTOMS
INSOMNIA: 0
DIZZINESS: 0
NAUSEA: 0
PALPITATIONS: 0
COUGH: 0
HEADACHES: 0
DIARRHEA: 0
VOMITING: 0
EYE PAIN: 0
FEVER: 0
SHORTNESS OF BREATH: 0
SENSORY CHANGE: 0
ABDOMINAL PAIN: 0
BLURRED VISION: 0
CHILLS: 0
FOCAL WEAKNESS: 0
BACK PAIN: 0

## 2022-11-13 ASSESSMENT — PAIN DESCRIPTION - PAIN TYPE
TYPE: ACUTE PAIN

## 2022-11-13 ASSESSMENT — LIFESTYLE VARIABLES: SUBSTANCE_ABUSE: 0

## 2022-11-13 NOTE — PROGRESS NOTES
Superficial infectious Disease Progress Note    Author: Rai Rodriguez M.D. Date & Time of service: 2022  9:32 AM    Chief Complaint:  Follow-up for bacteremia    Interval History:   T-max 102.6 on , white count up to 15.1 today, on cefepime and Flagyl.  Continues to have left elbow pain   patient afebrile since antibiotic initiation, white count of 11,000, tolerated switch to Ancef.   patient remains afebrile, white count is 13.4, tolerating Ancef.  MRI remains pending.  Elbow pain much better  11/10 patient remains afebrile, count is still elevated, 14.6 today, MRI still pending   patient remains afebrile, CBC this morning, MRI is still pending.   patient remains afebrile, count is up to 16.5 today, CT scan was done which was superficial and deep cellulitis.  Continues to have elbow pain   patient remains afebrile, white count remains persistently elevated at 15.1.  Orthopedics requesting MRI    Labs Reviewed and Medications Reviewed.    Review of Systems:  Review of Systems   Constitutional:  Negative for chills and fever.   Gastrointestinal:  Negative for abdominal pain, diarrhea, nausea and vomiting.   Musculoskeletal:  Positive for joint pain.   Skin:  Negative for itching and rash.   All other systems reviewed and are negative.    Hemodynamics:  Temp (24hrs), Av.8 °C (98.3 °F), Min:36.5 °C (97.7 °F), Max:37.3 °C (99.1 °F)  Temperature: 36.7 °C (98.1 °F)  Pulse  Av  Min: 65  Max: 140   Blood Pressure: 133/80       Physical Exam:  Physical Exam  Vitals and nursing note reviewed.   Constitutional:       General: He is not in acute distress.     Appearance: He is ill-appearing.      Comments: Chronically ill-appearing   HENT:      Mouth/Throat:      Comments: Poor dentition  Eyes:      General: No scleral icterus.        Right eye: No discharge.         Left eye: No discharge.      Conjunctiva/sclera: Conjunctivae normal.   Cardiovascular:      Rate and Rhythm:  Normal rate and regular rhythm.      Heart sounds: No murmur heard.  Pulmonary:      Effort: Pulmonary effort is normal. No respiratory distress.      Breath sounds: No stridor.   Abdominal:      General: Abdomen is flat. There is no distension.      Tenderness: There is no abdominal tenderness.   Musculoskeletal:         General: Swelling and tenderness present.      Comments: Left posterior elbow swelling with erythema significantly improved, pain with passive motion also significantly improved -however, there is a persistent erythematous and slightly fluctuant area overlying the olecranon process   Skin:     Findings: Erythema present.   Neurological:      General: No focal deficit present.      Mental Status: He is alert and oriented to person, place, and time.   Psychiatric:      Comments: Flat affect       Meds:    Current Facility-Administered Medications:     lisinopril    metoprolol SR    tamsulosin    spironolactone    influenza vaccine quad    HYDROcodone-acetaminophen    [COMPLETED] amiodarone **FOLLOWED BY** amiodarone **FOLLOWED BY** [START ON 11/17/2022] amiodarone    ceFAZolin    insulin regular **AND** POC blood glucose manual result **AND** NOTIFY MD and PharmD **AND** Administer 20 grams of glucose (approximately 8 ounces of fruit juice) every 15 minutes PRN FSBG less than 70 mg/dL **AND** dextrose bolus    acetaminophen    levothyroxine    [Held by provider] apixaban    traZODone    senna-docusate **AND** polyethylene glycol/lytes **AND** magnesium hydroxide **AND** bisacodyl    Labs:  Recent Labs     11/12/22  0120 11/13/22  0700   WBC 16.5* 15.1*   RBC 3.69* 3.79*   HEMOGLOBIN 11.0* 11.4*   HEMATOCRIT 33.8* 34.5*   MCV 91.6 91.0   MCH 29.8 30.1   RDW 52.8* 52.6*   PLATELETCT 235 266   MPV 10.4 10.5   NEUTSPOLYS 81.40* 79.10*   LYMPHOCYTES 8.80* 5.00*   MONOCYTES 5.30 4.20   EOSINOPHILS 2.70 0.00   BASOPHILS 0.00 0.80   RBCMORPHOLO Present Present       Recent Labs     11/11/22  0222   SODIUM  136   POTASSIUM 3.7   CHLORIDE 107   CO2 19*   GLUCOSE 109*   BUN 39*       Recent Labs     11/11/22  0222   CREATININE 1.11         Imaging:  CT-ABDOMEN-PELVIS W/O    Result Date: 11/5/2022 11/5/2022 9:05 AM HISTORY/REASON FOR EXAM:  Abdominal distention. TECHNIQUE/EXAM DESCRIPTION: CT scan of the abdomen and pelvis without contrast. Noncontrast helical scanning was obtained from the diaphragmatic domes through the pubic symphysis. Low dose optimization technique was utilized for this CT exam including automated exposure control and adjustment of the mA and/or kV according to patient size. COMPARISON: None. FINDINGS: Lower Chest: There is bibasilar atelectasis.. Liver: Normal. Spleen: Calcified granulomas. Pancreas: Atrophic. Gallbladder: There are multiple gallstones. Biliary: Nondilated. Adrenal glands: Normal. Kidneys: Unremarkable without hydronephrosis. Bowel: The colon is mildly distended and contains fluid.. Lymph nodes: No adenopathy. Vasculature: Unremarkable. Peritoneum: Unremarkable without ascites. Musculoskeletal: There are chronic compression fractures of the spine.. Pelvis: No adenopathy or free fluid.     1.  No evidence of bowel obstruction or focal inflammatory change. 2.  Gallstones. 3.  Mildly distended colon which also contains fluid. No evidence of bowel obstruction.    DX-CHEST-PORTABLE (1 VIEW)    Result Date: 11/7/2022 11/7/2022 12:04 AM HISTORY/REASON FOR EXAM:  sepsis. TECHNIQUE/EXAM DESCRIPTION AND NUMBER OF VIEWS: Single portable view of the chest. COMPARISON:  11/5/2022. FINDINGS: LUNGS: Linear atelectasis in the right lower lung. No focal consolidation. Persistent interstitial edema. No effusions. PNEUMOTHORAX: None. LINES AND TUBES: Stable right IJ central catheter. MEDIASTINUM: Stable cardiac silhouette. MUSCULOSKELETAL STRUCTURES: Unchanged.     1.  Stable cardiomegaly and interstitial edema. 2.  Stable linear atelectasis in the right lower lung.     DX-CHEST-PORTABLE (1  VIEW)    Result Date: 11/5/2022 11/5/2022 8:17 AM HISTORY/REASON FOR EXAM: Central line placement TECHNIQUE/EXAM DESCRIPTION AND NUMBER OF VIEWS: Single portable view of the chest. COMPARISON: 11/5/2022 FINDINGS: There is a right IJ central line present with the tip overlying the upper right atrium. No evidence of pneumothorax. There is interstitial edema. There is linear atelectasis within the right midlung. There is no pleural effusion. The heart is enlarged.     1.  Cardiomegaly. There is also interstitial edema. 2.  Right IJ central line present without evidence of complication. 3.  Linear atelectasis within the right lung base.    DX-CHEST-PORTABLE (1 VIEW)    Result Date: 11/5/2022 11/5/2022 3:40 AM HISTORY/REASON FOR EXAM:  Sepsis TECHNIQUE/EXAM DESCRIPTION AND NUMBER OF VIEWS: Single portable view of the chest. COMPARISON: 5/25/2021 FINDINGS: HEART: Not enlarged. LUNGS: Hazy opacity right lower lung. PLEURA: No effusion or pneumothorax.     1.  Hazy opacity at the right lower lung, similar to 5/25/2021 exam. Scarring versus pneumonia.    DX-ELBOW-COMPLETE 3+ LEFT    Result Date: 11/5/2022 11/5/2022 11:29 AM HISTORY/REASON FOR EXAM:  Pain/Deformity Following Trauma TECHNIQUE/EXAM DESCRIPTION AND NUMBER OF VIEWS:  3 views of the LEFT elbow. COMPARISON: None FINDINGS: There is no evidence of joint effusion. There is no evidence of displaced fracture or dislocation. There is no focal soft tissue swelling.     Normal elbow series.    US-EXTREMITY NON VASCULAR UNILATERAL LEFT    Result Date: 11/6/2022 11/6/2022 12:21 PM HISTORY/REASON FOR EXAM:  Left elbow swelling, redness, pain TECHNIQUE/EXAM DESCRIPTION AND NUMBER OF VIEWS: Ultrasound extremity nonvascular unilateral COMPARISON: X-ray yesterday FINDINGS: There is edema. No fluid collection identified.     Soft tissue edema in the area of concern about the left elbow without evidence of fluid collection/abscess.    EC-ECHOCARDIOGRAM COMPLETE W/  CONT    Result Date: 2022  Transthoracic Echo Report Echocardiography Laboratory CONCLUSIONS Severely reduced left ventricular systolic function. The left ventricular ejection fraction is visually estimated to be 25- 30%, but may be underestimated due to tachycardia. Global hypokinesis with more pronounced hypokinesis of the anterior and anterolateral walls. Diastolic function is abnormal, but grade cannot be determined. The right ventricle is not well visualized, but appears dilated with reduced systolic function. Biatrial dilation. Mild to moderate tricuspid regurgitation. Dilated inferior vena cava without inspiratory collapse. EMELY FLORES Exam Date:         2022                    11:46 Exam Location:     Inpatient Priority:          Routine Ordering Physician:        LESLIE ARMENTA Referring Physician: Sonographer:               Sheron Baxter RDCS Age:    57     Gender:    M MRN:    [id number] :    1965 BSA:    2.49   Ht (in):    72     Wt (lb):    290 Exam Type:     Complete, Contrast Indications:     Heart Failure, Systolic ICD Codes:       428.2 CPT Codes:       62276,  BP:   90     /   52     HR:   120 Technical Quality:       Fair MEASUREMENTS  (Male / Female) Normal Values 2D ECHO LV Diastolic Diameter PLAX        5.1 cm                4.2 - 5.9 / 3.9 - 5.3 cm LV Systolic Diameter PLAX         4.8 cm                2.1 - 4.0 cm IVS Diastolic Thickness           1 cm                  LVPW Diastolic Thickness          0.83 cm               LVOT Diameter                     2.2 cm                Estimated LV Ejection Fraction    30 %                  LV Ejection Fraction MOD BP       39.3 %                >= 55  % LV Ejection Fraction MOD 4C       41.2 %                LV Ejection Fraction MOD 2C       36.1 %                IVC Diameter                      2.5 cm                DOPPLER AV Peak Velocity                  1.2 m/s               AV Peak Gradient                  6  mmHg                AV Mean Gradient                  3.8 mmHg              LVOT Peak Velocity                0.85 m/s              AV Area Cont Eq vti               2.4 cm2               TV Peak E Velocity                0.51 m/s              TR Peak Velocity                  290 cm/s              * Indicates values subject to auto-interpretation LV EF:  30    % FINDINGS Left Ventricle Normal left ventricular chamber size. Normal left ventricular wall thickness. Severely reduced left ventricular systolic function. The left ventricular ejection fraction is visually estimated to be 25-30%, but may be underestimated due to tachycardia. Global hypokinesis with more pronounced hypokinesis of the anterior and anterolateral walls. Diastolic function is abnormal, but grade cannot be determined. 3 mL of contrast was used to enhance visualization of the endocardial border. Existing IV was used at the left elbow. Right Ventricle The right ventricle is not well visualized, but appears dilated with reduced systolic function. Estimated right ventricular systolic pressure is 50 mmHg Right Atrium The right atrium is not well visualized, but appears dilated. Dilated inferior vena cava without inspiratory collapse. Left Atrium The left atrium is not well visualized, but appears dilated. Mitral Valve The mitral valve is not well visualized. No stenosis or regurgitation seen. Aortic Valve The aortic valve is not well visualized. No stenosis or regurgitation seen. Tricuspid Valve No tricuspid stenosis. Mild to moderate tricuspid regurgitation. Pulmonic Valve Structurally normal pulmonic valve without significant stenosis or regurgitation. Pericardium Normal pericardium without effusion. Aorta Normal aortic root for body surface area. The ascending aorta diameter is 3.5 cm. Michael Acosta MD (Electronically Signed) Final Date:     05 November 2022                 14:02      Micro:  Results       Procedure Component Value Units  "Date/Time    BLOOD CULTURE [[id number]] Collected: 11/07/22 1053    Order Status: Completed Specimen: Blood from Peripheral Updated: 11/12/22 1300     Significant Indicator NEG     Source BLD     Site PERIPHERAL     Culture Result No growth after 5 days of incubation.    Narrative:      Per Hospital Policy: Only change Specimen Src: to \"Line\" if  specified by physician order.  No site indicated    BLOOD CULTURE [[id number]] Collected: 11/07/22 1102    Order Status: Completed Specimen: Blood from Peripheral Updated: 11/12/22 1300     Significant Indicator NEG     Source BLD     Site PERIPHERAL     Culture Result No growth after 5 days of incubation.    Narrative:      Per Hospital Policy: Only change Specimen Src: to \"Line\" if  specified by physician order.  No site indicated    CULTURE STOOL [[id number]] Collected: 11/05/22 0450    Order Status: Completed Specimen: Stool Updated: 11/08/22 1518     Significant Indicator NEG     Source STL     Site STOOL     Culture Result No enteric pathogens isolated.  NOTE:  Stool cultures are screened for Shiga Toxins 1 and 2,  Salmonella, Shigella, Campylobacter, Aeromonas,  Plesiomonas, and Vibrio.       EHEC Negative for Shiga Toxin 1 and 2.     Campylobactor Antigen --     Negative for Campylobacter Antigen.  Test results are to be used in conjunction with information  available from the patient clinical evaluation and other  diagnostic procedures.      Narrative:      For adult patients only; culture includes screen for  Campylobacter.  For adult patients only; culture includes screen for    Blood Culture [[id number]]  (Abnormal)  (Susceptibility) Collected: 11/05/22 0414    Order Status: Completed Specimen: Blood from Peripheral Updated: 11/07/22 1114     Significant Indicator POS     Source BLD     Site PERIPHERAL     Culture Result Growth detected by Bactec instrument. 11/05/2022  15:42      Streptococcus pyogenes (Group A)  This isolate does NOT demonstrate inducible " "Clindamycin  resistance in vitro.      Narrative:      CALL  [place]  161 tel. [phone],  CALLED  161 tel. [phone] 11/06/2022, 12:01, RB PERF. RESULTS CALLED TO:  [id number]  1 of 2 for Blood Culture x 2 sites order. Per Hospital  Policy: Only change Specimen Src: to \"Line\" if specified by  physician order.  Left Hand    Susceptibility       Streptococcus pyogenes (group a) (1)       Antibiotic Interpretation Microscan   Method Status    Penicillin Sensitive 0.012 mcg/mL E-TEST Final    Cefotaxime Sensitive 0.012 mcg/mL E-TEST Final    Clindamycin Sensitive - mcg/mL E-TEST Final                       Blood Culture [[id number]]  (Abnormal) Collected: 11/05/22 0415    Order Status: Completed Specimen: Blood from Peripheral Updated: 11/07/22 1102     Significant Indicator POS     Source BLD     Site PERIPHERAL     Culture Result Growth detected by Bactec instrument. 11/05/2022  14:27      Streptococcus pyogenes (Group A)  See previous culture for sensitivity report.      Narrative:      CALL  [place]  161 tel. [phone],  CALLED  161 tel. [phone] 11/05/2022, 14:52, RB PERF. RESULTS CALLED  TO:[id number]  2 of 2 blood culture x2  Sites order. Per Hospital Policy:  Only change Specimen Src: to \"Line\" if specified by physician  order.  Right Wrist    E-Test [[id number]] Collected: 11/05/22 0414    Order Status: Completed Specimen: Other Updated: 11/07/22 1100     ETEST Sensitivity FINAL    Narrative:      161 tel. [phone] 11/06/2022, 12:01, RB PERF. RESULTS CALLED TO: [id number]  1 of 2 for Blood Culture x 2 sites order. Per Hospital  Policy: Only change Specimen Src: to \"Line\" if specified by  physician order.    Urine Culture (NEW) [[id number]] Collected: 11/05/22 0500    Order Status: Completed Specimen: Urine Updated: 11/07/22 0950     Significant Indicator NEG     Source UR     Site -     Culture Result No growth at 48 hours.    Narrative:      Indication for culture:->Evaluation for sepsis without a  clear source of " infection  Indication for culture:->Evaluation for sepsis without a    EHEC(Shiga Toxin)Detection [[id number]] Collected: 11/05/22 9255    Order Status: Completed Specimen: Stool Updated: 11/06/22 1539     Significant Indicator NEG     Source STL     Site STOOL     EHEC Negative for Shiga Toxin 1 and 2.    Narrative:      For adult patients only; culture includes screen for  Campylobacter.  For adult patients only; culture includes screen for            Assessment:  This is a 57-year-old male patient with type 2 diabetes, nonischemic cardiomyopathy, A. fib on amiodarone and Eliquis, prior hospitalization for bowel perforation, presented with 4 days of rigors, watery diarrhea, fevers has a chronic superficial left heel wound, was found to be in septic shock with acute renal failure and lactic acidosis, blood cultures positive for GPC in chains.  CT scan of the abdomen pelvis with no inflammation.  Chest x-ray with interstitial opacities.  TTE with EF of 25 to 30%, no obvious valvular vegetations were noted.    Plan:  -Blood cultures positive for Group A Strep  -Multiple allergies including Unasyn, Zosyn, patient prefers not to attempt challenge.  Continue IV Ancef 2 g every 8 hours  -Exam and clinical course taken together concerning for septic olecranon bursitis. Today is day 9 of appropriate IV antibiotics. MRI was significantly delayed for about 4 days so CT scan was obtained which showed superficial and deep cellulitis.  White count continues to trend up. Recommend orthopedic involvement and consideration of I&D.  Orthopedics recommending MRI - ordered and pending  -Repeat blood cultures x2 from 11/7 no growth to date    Discussed with internal medicine, Dr. Wills

## 2022-11-13 NOTE — CONSULTS
DATE OF SERVICE:  11/12/2022     ORTHOPEDIC CONSULTATION     REQUESTING PHYSICIAN:  Germain Wills MD, hospitalist service.     REASON FOR CONSULTATION:  Left elbow and forearm pain with concern for   infection.     HISTORY OF PRESENT ILLNESS:  The patient is a 57-year-old male who was   admitted on 11/05/2022 with septic shock.  He has improved clinically with   empiric antibiotic therapy, but he is complaining of pain in the left forearm   and elbow area and this was felt to potentially be a source of his sepsis and   I was consulted by Dr. Wills at the recommendation of the infectious disease   service to determine whether or not there is any surgical indication for   swelling and erythema and pain around the elbow.  He did have a CT of the left   elbow, which did not show obvious fluid collection, no obvious elbow   effusion, but some swelling in the proximal forearm consistent with   cellulitis.  The patient states that he feels like the elbow is getting a   little bit worse over the last couple of days.     PAST MEDICAL HISTORY:  ALLERGIES:  ZOSYN, DAPTOMYCIN, PENICILLIN, UNASYN AND VANCOMYCIN.     OUTPATIENT MEDICATIONS:  Include Paxil, Requip, Desyrel, Tylenol, Geodon,   Theragran, Prinivil, Metamucil, Toprol-XL, amiodarone, Eliquis, Klonopin,   Synthroid, Flomax, Prilosec, vitamin D, magnesium.     PAST MEDICAL DIAGNOSES:  Include history of abscess and bacteremia, congestive   heart failure, diabetes with neuropathy, hypertension, atrial fibrillation,   left ventricular dysfunction, morbid obesity, nonischemic cardiomyopathy,   orthostatic hypotension.     PAST SURGICAL HISTORY:  Colectomy, colostomy takedown, toe amputations on the   right foot, cardiac catheterization.     SOCIAL HISTORY:  The patient denies alcohol, illicit drug use and tobacco use.     PHYSICAL EXAMINATION:  VITAL SIGNS:  Temperature is 97.9, heart rate 77, respiratory rate 18, blood   pressure 109/72, pulse oximetry 94% on room  air.  GENERAL APPEARANCE:  The patient is alert, pleasant, in no acute distress.  He   is following commands.  MUSCULOSKELETAL:  Left upper extremity:  He does have some pitting edema to   the proximal forearm and tenderness to palpation over the dorsal and posterior   aspect of the proximal forearm.  There are no obvious open wounds present.    He has some ecchymosis on the dorsum of the hand.  He is nontender over the   olecranon process.  There is no evidence of obvious fluctuance or induration   or swelling at the olecranon bursal area.  He is mildly tender to palpation of   the proximal radius.  He has limited active elbow flexion, extension and   improved passive elbow flexion, extension compared to his active range of   motion.  He does have a little bit of pain with passive forearm pronation and   supination.  There is no obvious swelling at the radiocapitellar joint based   on palpation clinically and no obvious fluctuance in that area.  There is   tenderness and induration and swelling more distally than the elbow.  He is   neurovascularly intact distally notable.     DIAGNOSTIC IMAGING:  CT of the left elbow per radiology report shows no   evidence of obvious elbow effusion.  No evidence of obvious fluid collection,   but soft tissue swelling and induration about the elbow and proximal forearm,   most consistent with superficial and deep cellulitis.  No evidence of obvious   periosteal reaction to suggest osteomyelitis is seen.  There is no fracture   seen.     ASSESSMENT:  A 57-year-old male with a recent admission to the hospitalist   service 7 days ago for a septic shock.  He is improved clinically on empiric   antibiotic therapy, but he has persistent complaints of pain of the left   proximal forearm and elbow area.  Clinically, his exam is equivocal, but CT   imaging did not show obvious elbow effusion, which makes septic arthritis   unlikely clinically and based on CT imaging, there is no evidence  of fluid in   the olecranon bursa, so septic olecranon bursitis is very unlikely.  He likely   has cellulitis of the proximal forearm.  I cannot rule out the potential for   development of an early abscess in that area.     RECOMMENDATIONS:  1.  I discussed these findings with the patient and I feel that at this point   since it seems that his pain is worsening and he still has residual   leukocytosis, he would benefit from an MRI to the area just to rule out early   abscess formation.  2.  If in fact there is obvious fluid collection, then he may benefit from   surgical incision and drainage.  3.  I recommend he be made n.p.o. after midnight in the event that MRI, which   hopefully can be done this evening, shows a fluid collection and then we could   perform surgical incision and drainage tomorrow if indicated.  The patient   expressed comfort and understanding of this plan.        ______________________________  MD GINETTE Whitfield/KRISTINA/LISA    DD:  11/12/2022 21:17  DT:  11/12/2022 23:49    Job#:  [id number]

## 2022-11-13 NOTE — CARE PLAN
The patient is Stable - Low risk of patient condition declining or worsening    Shift Goals  Clinical Goals: Wound care, mobility, abx  Patient Goals: Rest  Family Goals: VON    Progress made toward(s) clinical / shift goals:    Problem: Respiratory  Goal: Patient will achieve/maintain optimum respiratory ventilation and gas exchange  Outcome: Progressing     Problem: Fall Risk  Goal: Patient will remain free from falls  Outcome: Progressing     Problem: Pain - Standard  Goal: Alleviation of pain or a reduction in pain to the patient’s comfort goal  Outcome: Progressing       Patient is not progressing towards the following goals:

## 2022-11-13 NOTE — PROGRESS NOTES
Report received from Taisha Minor. Assumed pt care. Pt resting comfortably. Fall precautions in place, call light and belongings within reach, bed in lowest position. No signs of distress.

## 2022-11-13 NOTE — PROGRESS NOTES
57yoM admitted with septic shock and bacteremia 11/5.  Clinically improving.  Has left proximal forearm/elbow cellulitis.    S: Had MRI this am.  Arm feeling a little better.    O:  Vitals:    11/13/22 0735   BP: 133/80   Pulse: 86   Resp: 17   Temp: 36.7 °C (98.1 °F)   SpO2: 92%     Exam:  General-alert and following commands  LUE-mild erythema, swelling improving to forearm/elbow, still mild pitting edema and mild induration, no fluctuance, limited AROM, NVI distally    Procedure: Sterile arthrocentesis of lateral elbow joint performed and only scant amount of normal appearing joint fluid with subsequent bloody fluid seen totaling about 1cc.  Sample clotted so not send to lab.  Patient tolerated procedure well.    A: 57yoM admitted with septic shock and bacteremia 11/5.  Clinically improving.  Has left proximal forearm/elbow cellulitis.  MRI did show small elbow effusion, likely reactive and elbow arthrocentesis not consistent with septic arthritis.    Recs:  --fu formal radiology interpretation of MRI.  No plan for surgery until obvious abscess is detected.  Okay to resume diet if MRI not concerning for abscess.  --continue abx per ID recs  --continue monitoring for clinical improvement.

## 2022-11-13 NOTE — PROGRESS NOTES
Hospital Medicine Daily Progress Note    Date of Service  11/13/2022    Chief Complaint  Sebastián Castro is a 57 y.o. male admitted 11/5/2022 with septic shock.    Hospital Course  57 y.o. male with a past medical history of permanent A. fib on amiodarone and Eliquis, non-insulin-dependent diabetes mellitus type 2, nonischemic cardiomyopathy and prior hospitalization for bowel perforation status post expiratory laparotomy presents to the hospital with complaint of rigors on 11/5/2022.  On presentation patient reported that he has been having symptoms of diarrhea and rigors for the last 4 days which is associated with fevers.  He has been living at the skilled nursing facility for the past 1 year.  He reported generalized weakness and lightheadedness with visual changes on admission.  On admission he found to have septic shock and received IV fluid bolus and started him on IV antibiotics and he was placed on pressor due to hypotension with a target map of greater than 65.  Infectious disease consulted for positive blood cultures and the recommended CÉSAR if persistently positive blood cultures.  He was placed on cefepime and Flagyl.  There would be possible source of joint infection of left elbow and he underwent ultrasound did not show fluid collection and MRI ordered to evaluate for septic arthritis.  TTE did not show signs of endocarditis.  He found to have acute kidney injury it has been improving.  For his atrial fibrillation currently he is on rate control and he has been receiving Eliquis for anticoagulation.    Interval Problem Update  No acute events overnight.  Orthopedic surgery consulted yesterday for elbow evaluation.  MRI elbow ordered, currently pending.  NPO for now pending MRI results, may need I+D later today.  Continue ancef for strep bacteremia.  Ultimately will need SNF when medically cleared for IV antibiotics, PT/OT.    I have discussed this patient's plan of care and discharge plan at IDT rounds  today with Case Management, Nursing, Nursing leadership, and other members of the IDT team.    Consultants/Specialty  critical care and infectious disease    Code Status  Full Code    Disposition  Patient is not medically cleared for discharge.   Anticipate discharge to SNF.  I have placed the appropriate orders for post-discharge needs.    Review of Systems  Review of Systems   Constitutional:  Negative for chills and fever.   Eyes:  Negative for blurred vision and pain.   Respiratory:  Negative for cough and shortness of breath.    Cardiovascular:  Negative for chest pain, palpitations and leg swelling.   Gastrointestinal:  Negative for abdominal pain, nausea and vomiting.   Genitourinary:  Negative for dysuria and urgency.   Musculoskeletal:  Positive for joint pain. Negative for back pain.   Skin:  Negative for itching and rash.   Neurological:  Negative for dizziness, sensory change, focal weakness and headaches.   Psychiatric/Behavioral:  Negative for substance abuse. The patient does not have insomnia.       Physical Exam  Temp:  [36.5 °C (97.7 °F)-37.3 °C (99.1 °F)] 36.7 °C (98.1 °F)  Pulse:  [65-87] 86  Resp:  [17-18] 17  BP: (109-133)/(62-81) 133/80  SpO2:  [91 %-94 %] 92 %    Physical Exam  Constitutional:       General: He is not in acute distress.     Appearance: He is not ill-appearing.   HENT:      Head: Normocephalic and atraumatic.      Right Ear: External ear normal.      Left Ear: External ear normal.      Mouth/Throat:      Pharynx: No oropharyngeal exudate or posterior oropharyngeal erythema.   Eyes:      Extraocular Movements: Extraocular movements intact.      Pupils: Pupils are equal, round, and reactive to light.   Cardiovascular:      Rate and Rhythm: Normal rate and regular rhythm.      Pulses: Normal pulses.      Heart sounds: Normal heart sounds.   Pulmonary:      Effort: Pulmonary effort is normal. No respiratory distress.      Breath sounds: Normal breath sounds. No wheezing or rales.    Abdominal:      General: Bowel sounds are normal. There is no distension.      Palpations: Abdomen is soft.      Tenderness: There is no abdominal tenderness. There is no guarding or rebound.   Musculoskeletal:         General: Swelling present. No tenderness.      Cervical back: Normal range of motion and neck supple.      Right lower leg: No edema.      Left lower leg: No edema.      Comments: Left elbow with some inflammation, no open wounds or drainage   Skin:     General: Skin is warm and dry.   Neurological:      General: No focal deficit present.      Mental Status: He is oriented to person, place, and time.      Cranial Nerves: No cranial nerve deficit.      Sensory: No sensory deficit.      Motor: No weakness.   Psychiatric:         Mood and Affect: Mood normal.         Behavior: Behavior normal.       Fluids    Intake/Output Summary (Last 24 hours) at 11/13/2022 1021  Last data filed at 11/13/2022 0521  Gross per 24 hour   Intake 180 ml   Output 375 ml   Net -195 ml       Laboratory  Recent Labs     11/12/22  0120 11/13/22  0700   WBC 16.5* 15.1*   RBC 3.69* 3.79*   HEMOGLOBIN 11.0* 11.4*   HEMATOCRIT 33.8* 34.5*   MCV 91.6 91.0   MCH 29.8 30.1   MCHC 32.5* 33.0*   RDW 52.8* 52.6*   PLATELETCT 235 266   MPV 10.4 10.5     Recent Labs     11/11/22  0222   SODIUM 136   POTASSIUM 3.7   CHLORIDE 107   CO2 19*   GLUCOSE 109*   BUN 39*   CREATININE 1.11   CALCIUM 8.9                   Imaging  CT-ELBOW WITH LEFT   Final Result      1.  No bony abnormality indicative of osteomyelitis.   2.  Diffuse soft tissue swelling and induration about the elbow and proximal forearm most consistent with superficial and deep cellulitis.   3.  Note, CT is suboptimal for soft tissue detail and MRI of the elbow could be pursued if clinically indicated for management and would also be preferable for detection of early osteomyelitis.      DX-CHEST-PORTABLE (1 VIEW)   Final Result      1.  Stable cardiomegaly and interstitial  edema.   2.  Stable linear atelectasis in the right lower lung.         US-EXTREMITY NON VASCULAR UNILATERAL LEFT   Final Result      Soft tissue edema in the area of concern about the left elbow without evidence of fluid collection/abscess.      EC-ECHOCARDIOGRAM COMPLETE W/ CONT   Final Result      DX-ELBOW-COMPLETE 3+ LEFT   Final Result      Normal elbow series.      CT-ABDOMEN-PELVIS W/O   Final Result      1.  No evidence of bowel obstruction or focal inflammatory change.      2.  Gallstones.      3.  Mildly distended colon which also contains fluid. No evidence of bowel obstruction.      DX-CHEST-PORTABLE (1 VIEW)   Final Result      1.  Cardiomegaly. There is also interstitial edema.      2.  Right IJ central line present without evidence of complication.      3.  Linear atelectasis within the right lung base.      DX-CHEST-PORTABLE (1 VIEW)   Final Result      1.  Hazy opacity at the right lower lung, similar to 5/25/2021 exam. Scarring versus pneumonia.      MR-ELBOW-WITH & W/O LEFT    (Results Pending)        Assessment/Plan  * Severe sepsis with septic shock (HCC)- (present on admission)  Assessment & Plan  Continue intravenous antimicrobial therapy  Shock has resolved - he is off vasopressor (levophed).  Sepsis is improving  Taper hydrocortisone, 50 mg IV daily    Swelling of left elbow joint  Assessment & Plan  Edema and Erythema of Left Elbow Joint - Native Joint  Significant Tenderness to palpation.   CT elbow without signs of abscess or septic arthritis, shows cellulitis; continue antibiotics  MRI elbow ordered      Acute on chronic heart failure (HCC)  Assessment & Plan  Hx of Nonischemic Cardiomyopathy  Lower Extremity Edema   BNP > 33,000  Echo from 2021 with EF of 55%  Repeat echo this admission revealed a EF of 25 to 30%  Metoprolol, lisinopril, lasix, spironolactone  Follow up with cardiology as outpatient    Streptococcal bacteremia  Assessment & Plan  Blood cultures positive for  streptococcus, source unclear  Recent TTE negative for endocarditis  ID consulted and following          Continue IV Ancef 2g q 8 hrs   I discussed plan of care with him.    Thrombocytopenia (HCC)- (present on admission)  Assessment & Plan  Stable current platelet count is 137.  No signs of active bleeding.    Gastroenteritis  Assessment & Plan  Started 4 days prior to Admission  CT Abdomen unremarkable.  Improving slightly.  Will continue to monitor  Antidiarrheals prn      Bacteremia due to Gram-positive bacteria  Assessment & Plan  Group A streptococcus Bacteremia, source unclear   Was in Septic Shock  Initially given Fluid administration per sepsis protocol.   Started on Pressor Levophed on 11/5 -> D/Cd on 11/8.    Started on Hydrocortisone 11/6 - Stress dose, hx of adrenal insuff.  Abdominal CT and Chest Xray unremarkable.  TTE without evidence of endocarditis  UA mild UTI, culture negative to date.  Suspected left elbow as infection source.    ID consulted, pt started on cefepime and flagyl empirically.     On 11/6 daptomycin was added for Enterococcus coverage.    On 11/7 cefepime, flagyl, & daptomycin D/Cd. Started IV Ancef 2 g every 8 hours. Rpt Blood Cx obtained.     - Taper hydrocortisone, 50 mg IV daily  - Continue IV Ancef 2 g q8 hrs  - Continue to Follow Rpt Blood Cxs, negative to date.   CT elbow shows no discrete abscess or signs of septic arthritis; does show cellulitis  MRI elbow ordered, orthopedic surgery consulted, will follow up MRI results to dictate plan of care    Permanent atrial fibrillation (HCC)- (present on admission)  Assessment & Plan  Currently in atrial fibrillation with RVR.   Rate controlled              Continue apixaban, 5 mg twice daily              Continue amiodarone  Will restart metoprolol as BP is stable.     Hypothyroidism- (present on admission)  Assessment & Plan  Continue Synthroid    Acute kidney injury (HCC)  Assessment & Plan  Initially Creatinine of 3.13 and GFR of  22  -Avoid Nephrotoxins and Renally Dose Meds   Resolved with sepsis improvement      Nonischemic cardiomyopathy (HCC)  Assessment & Plan  Unclear etiology of CM  Left heart cath in 2018 was negative for CAD  EF 30% here, worse than prior  Continue home metoprolol, lisinopril; start aldactone  Follow up with cardiology as outpatient for further management    Obesity due to excess calories with serious comorbidity- (present on admission)  Assessment & Plan  Will need counseling when clinically appropriate    Hypokalemia- (present on admission)  Assessment & Plan  From diarrhea  Replace         VTE prophylaxis: therapeutic anticoagulation with eliquis    I have performed a physical exam and reviewed and updated ROS and Plan today (11/13/2022). In review of yesterday's note (11/12/2022), there are no changes except as documented above.

## 2022-11-13 NOTE — PROGRESS NOTES
Bedside report received from NOC RN. Assumed care of pt. Pt awake, laying in bed. A/Ox4, VSS. No concerns, complaints or distress. Pt educated to call before getting out of bed. POC reviewed and white board updated. Pt is medical. Call light in reach. Bed locked in lowest position with 2 upper bed rails up. Bed alarm on.

## 2022-11-14 LAB
ALBUMIN SERPL BCP-MCNC: 2.7 G/DL (ref 3.2–4.9)
ALBUMIN/GLOB SERPL: 0.8 G/DL
ALP SERPL-CCNC: 131 U/L (ref 30–99)
ALT SERPL-CCNC: 6 U/L (ref 2–50)
ANION GAP SERPL CALC-SCNC: 10 MMOL/L (ref 7–16)
ANISOCYTOSIS BLD QL SMEAR: ABNORMAL
AST SERPL-CCNC: 26 U/L (ref 12–45)
BASOPHILS # BLD AUTO: 0.9 % (ref 0–1.8)
BASOPHILS # BLD: 0.11 K/UL (ref 0–0.12)
BILIRUB SERPL-MCNC: 1.1 MG/DL (ref 0.1–1.5)
BUN SERPL-MCNC: 22 MG/DL (ref 8–22)
CALCIUM SERPL-MCNC: 8.2 MG/DL (ref 8.5–10.5)
CHLORIDE SERPL-SCNC: 107 MMOL/L (ref 96–112)
CO2 SERPL-SCNC: 18 MMOL/L (ref 20–33)
CREAT SERPL-MCNC: 1.02 MG/DL (ref 0.5–1.4)
EOSINOPHIL # BLD AUTO: 0.33 K/UL (ref 0–0.51)
EOSINOPHIL NFR BLD: 2.6 % (ref 0–6.9)
ERYTHROCYTE [DISTWIDTH] IN BLOOD BY AUTOMATED COUNT: 52.6 FL (ref 35.9–50)
GFR SERPLBLD CREATININE-BSD FMLA CKD-EPI: 84 ML/MIN/1.73 M 2
GLOBULIN SER CALC-MCNC: 3.3 G/DL (ref 1.9–3.5)
GLUCOSE BLD STRIP.AUTO-MCNC: 100 MG/DL (ref 65–99)
GLUCOSE BLD STRIP.AUTO-MCNC: 102 MG/DL (ref 65–99)
GLUCOSE BLD STRIP.AUTO-MCNC: 103 MG/DL (ref 65–99)
GLUCOSE BLD STRIP.AUTO-MCNC: 91 MG/DL (ref 65–99)
GLUCOSE BLD STRIP.AUTO-MCNC: 93 MG/DL (ref 65–99)
GLUCOSE SERPL-MCNC: 103 MG/DL (ref 65–99)
HCT VFR BLD AUTO: 31.9 % (ref 42–52)
HGB BLD-MCNC: 10.6 G/DL (ref 14–18)
LYMPHOCYTES # BLD AUTO: 0.99 K/UL (ref 1–4.8)
LYMPHOCYTES NFR BLD: 7.8 % (ref 22–41)
MACROCYTES BLD QL SMEAR: ABNORMAL
MAGNESIUM SERPL-MCNC: 1.5 MG/DL (ref 1.5–2.5)
MANUAL DIFF BLD: NORMAL
MCH RBC QN AUTO: 30.5 PG (ref 27–33)
MCHC RBC AUTO-ENTMCNC: 33.2 G/DL (ref 33.7–35.3)
MCV RBC AUTO: 91.7 FL (ref 81.4–97.8)
METAMYELOCYTES NFR BLD MANUAL: 1.7 %
MONOCYTES # BLD AUTO: 0.56 K/UL (ref 0–0.85)
MONOCYTES NFR BLD AUTO: 4.4 % (ref 0–13.4)
MORPHOLOGY BLD-IMP: NORMAL
NEUTROPHILS # BLD AUTO: 10.49 K/UL (ref 1.82–7.42)
NEUTROPHILS NFR BLD: 82.6 % (ref 44–72)
NRBC # BLD AUTO: 0 K/UL
NRBC BLD-RTO: 0 /100 WBC
PLATELET # BLD AUTO: 238 K/UL (ref 164–446)
PLATELET BLD QL SMEAR: NORMAL
PMV BLD AUTO: 10.4 FL (ref 9–12.9)
POTASSIUM SERPL-SCNC: 2.8 MMOL/L (ref 3.6–5.5)
PROT SERPL-MCNC: 6 G/DL (ref 6–8.2)
RBC # BLD AUTO: 3.48 M/UL (ref 4.7–6.1)
RBC BLD AUTO: PRESENT
SODIUM SERPL-SCNC: 135 MMOL/L (ref 135–145)
WBC # BLD AUTO: 12.7 K/UL (ref 4.8–10.8)

## 2022-11-14 PROCEDURE — 700102 HCHG RX REV CODE 250 W/ 637 OVERRIDE(OP): Performed by: INTERNAL MEDICINE

## 2022-11-14 PROCEDURE — 700105 HCHG RX REV CODE 258: Performed by: INTERNAL MEDICINE

## 2022-11-14 PROCEDURE — 80053 COMPREHEN METABOLIC PANEL: CPT

## 2022-11-14 PROCEDURE — A9270 NON-COVERED ITEM OR SERVICE: HCPCS | Performed by: INTERNAL MEDICINE

## 2022-11-14 PROCEDURE — 700111 HCHG RX REV CODE 636 W/ 250 OVERRIDE (IP): Performed by: INTERNAL MEDICINE

## 2022-11-14 PROCEDURE — 85007 BL SMEAR W/DIFF WBC COUNT: CPT

## 2022-11-14 PROCEDURE — 700102 HCHG RX REV CODE 250 W/ 637 OVERRIDE(OP): Performed by: STUDENT IN AN ORGANIZED HEALTH CARE EDUCATION/TRAINING PROGRAM

## 2022-11-14 PROCEDURE — 85025 COMPLETE CBC W/AUTO DIFF WBC: CPT

## 2022-11-14 PROCEDURE — 770001 HCHG ROOM/CARE - MED/SURG/GYN PRIV*

## 2022-11-14 PROCEDURE — 99232 SBSQ HOSP IP/OBS MODERATE 35: CPT | Performed by: STUDENT IN AN ORGANIZED HEALTH CARE EDUCATION/TRAINING PROGRAM

## 2022-11-14 PROCEDURE — 97530 THERAPEUTIC ACTIVITIES: CPT

## 2022-11-14 PROCEDURE — 83735 ASSAY OF MAGNESIUM: CPT

## 2022-11-14 PROCEDURE — 36415 COLL VENOUS BLD VENIPUNCTURE: CPT

## 2022-11-14 PROCEDURE — A9270 NON-COVERED ITEM OR SERVICE: HCPCS | Performed by: STUDENT IN AN ORGANIZED HEALTH CARE EDUCATION/TRAINING PROGRAM

## 2022-11-14 PROCEDURE — 99233 SBSQ HOSP IP/OBS HIGH 50: CPT | Performed by: INTERNAL MEDICINE

## 2022-11-14 PROCEDURE — 82962 GLUCOSE BLOOD TEST: CPT

## 2022-11-14 RX ORDER — POTASSIUM CHLORIDE 20 MEQ/1
40 TABLET, EXTENDED RELEASE ORAL 2 TIMES DAILY
Status: COMPLETED | OUTPATIENT
Start: 2022-11-14 | End: 2022-11-14

## 2022-11-14 RX ADMIN — SPIRONOLACTONE 25 MG: 25 TABLET ORAL at 05:49

## 2022-11-14 RX ADMIN — TRAZODONE HYDROCHLORIDE 50 MG: 50 TABLET ORAL at 21:16

## 2022-11-14 RX ADMIN — METOPROLOL SUCCINATE 50 MG: 50 TABLET, EXTENDED RELEASE ORAL at 05:49

## 2022-11-14 RX ADMIN — LISINOPRIL 20 MG: 20 TABLET ORAL at 05:49

## 2022-11-14 RX ADMIN — AMIODARONE HYDROCHLORIDE 400 MG: 200 TABLET ORAL at 05:49

## 2022-11-14 RX ADMIN — TAMSULOSIN HYDROCHLORIDE 0.4 MG: 0.4 CAPSULE ORAL at 07:53

## 2022-11-14 RX ADMIN — POTASSIUM CHLORIDE 40 MEQ: 1500 TABLET, EXTENDED RELEASE ORAL at 16:34

## 2022-11-14 RX ADMIN — POTASSIUM CHLORIDE 40 MEQ: 1500 TABLET, EXTENDED RELEASE ORAL at 07:53

## 2022-11-14 RX ADMIN — LEVOTHYROXINE SODIUM 150 MCG: 0.07 TABLET ORAL at 05:48

## 2022-11-14 RX ADMIN — HYDROCODONE BITARTRATE AND ACETAMINOPHEN 2 TABLET: 5; 325 TABLET ORAL at 08:07

## 2022-11-14 RX ADMIN — CEFAZOLIN 2 G: 2 INJECTION, POWDER, FOR SOLUTION INTRAMUSCULAR; INTRAVENOUS at 05:46

## 2022-11-14 RX ADMIN — CEFAZOLIN 2 G: 2 INJECTION, POWDER, FOR SOLUTION INTRAMUSCULAR; INTRAVENOUS at 22:00

## 2022-11-14 RX ADMIN — CEFAZOLIN 2 G: 2 INJECTION, POWDER, FOR SOLUTION INTRAMUSCULAR; INTRAVENOUS at 14:42

## 2022-11-14 ASSESSMENT — ENCOUNTER SYMPTOMS
SENSORY CHANGE: 0
BLURRED VISION: 0
SHORTNESS OF BREATH: 0
HEADACHES: 0
NAUSEA: 0
ABDOMINAL PAIN: 0
BACK PAIN: 0
COUGH: 0
EYE PAIN: 0
FEVER: 0
FOCAL WEAKNESS: 0
DIZZINESS: 0
INSOMNIA: 0
DIARRHEA: 0
CHILLS: 0
VOMITING: 0
PALPITATIONS: 0

## 2022-11-14 ASSESSMENT — COGNITIVE AND FUNCTIONAL STATUS - GENERAL
CLIMB 3 TO 5 STEPS WITH RAILING: TOTAL
MOBILITY SCORE: 11
MOVING FROM LYING ON BACK TO SITTING ON SIDE OF FLAT BED: A LOT
SUGGESTED CMS G CODE MODIFIER MOBILITY: CL
STANDING UP FROM CHAIR USING ARMS: A LITTLE
TURNING FROM BACK TO SIDE WHILE IN FLAT BAD: A LOT
MOVING TO AND FROM BED TO CHAIR: UNABLE
WALKING IN HOSPITAL ROOM: A LOT

## 2022-11-14 ASSESSMENT — PAIN DESCRIPTION - PAIN TYPE
TYPE: ACUTE PAIN

## 2022-11-14 ASSESSMENT — GAIT ASSESSMENTS
DEVIATION: INCREASED BASE OF SUPPORT;SHUFFLED GAIT;DECREASED HEEL STRIKE;DECREASED TOE OFF
GAIT LEVEL OF ASSIST: CONTACT GUARD ASSIST
DISTANCE (FEET): 3
ASSISTIVE DEVICE: FRONT WHEEL WALKER

## 2022-11-14 ASSESSMENT — LIFESTYLE VARIABLES: SUBSTANCE_ABUSE: 0

## 2022-11-14 ASSESSMENT — FIBROSIS 4 INDEX
FIB4 SCORE: 2.63
FIB4 SCORE: 2.63

## 2022-11-14 NOTE — CARE PLAN
The patient is Stable - Low risk of patient condition declining or worsening    Shift Goals  Clinical Goals:  Q2H turns, Skin care  Patient Goals:  Rest, Q2H turns  Family Goals: VON    Progress made toward(s) clinical / shift goals:    Problem: Fluid Volume  Goal: Fluid volume balance will be maintained  Outcome: Progressing     Problem: Urinary - Renal Perfusion  Goal: Ability to achieve and maintain adequate renal perfusion and functioning will improve  Outcome: Progressing     Problem: Respiratory  Goal: Patient will achieve/maintain optimum respiratory ventilation and gas exchange  Outcome: Progressing     Problem: Fall Risk  Goal: Patient will remain free from falls  Outcome: Progressing     Problem: Pain - Standard  Goal: Alleviation of pain or a reduction in pain to the patient’s comfort goal  Outcome: Progressing       Patient is not progressing towards the following goals:

## 2022-11-14 NOTE — PROGRESS NOTES
Hospital Medicine Daily Progress Note    Date of Service  11/14/2022    Chief Complaint  Sebastián Castro is a 57 y.o. male admitted 11/5/2022 with septic shock.    Hospital Course  57 y.o. male with a past medical history of permanent A. fib on amiodarone and Eliquis, non-insulin-dependent diabetes mellitus type 2, nonischemic cardiomyopathy and prior hospitalization for bowel perforation status post expiratory laparotomy presents to the hospital with complaint of rigors on 11/5/2022.  On presentation patient reported that he has been having symptoms of diarrhea and rigors for the last 4 days which is associated with fevers.  He has been living at the skilled nursing facility for the past 1 year.  He reported generalized weakness and lightheadedness with visual changes on admission.  On admission he found to have septic shock and received IV fluid bolus and started him on IV antibiotics and he was placed on pressor due to hypotension with a target map of greater than 65.  Infectious disease consulted for positive blood cultures and the recommended CÉSAR if persistently positive blood cultures.  He was placed on cefepime and Flagyl.  There would be possible source of joint infection of left elbow and he underwent ultrasound did not show fluid collection. MRI elbow obtained showing no evidence of septic arthritis, however joint effusion present. Orthopedic surgery consulted for possible aspiration of joint.  TTE did not show signs of endocarditis.  He found to have acute kidney injury it has been improving.  For his atrial fibrillation currently he is on rate control and he has been receiving Eliquis for anticoagulation.    Interval Problem Update  No acute events overnight.  MRI elbow shows joint effusion likely reactive, no signs of septic arthritis or discrete abscess.  ID recommend joint aspiration.  Orthopedic surgery following, appreciate recommendations.  Continue ancef for strep bacteremia.  Patient with diarrhea,  check c diff.  K low 2.8, repleting orally. Monitor.  Holding home eliquis for now given possible operative intervention. Resume when appropriate.  Ultimately will need SNF when medically cleared for IV antibiotics, PT/OT. Patient can go back to [facility] where he came from, on discharge.      I have discussed this patient's plan of care and discharge plan at IDT rounds today with Case Management, Nursing, Nursing leadership, and other members of the IDT team.    Consultants/Specialty  critical care and infectious disease    Code Status  Full Code    Disposition  Patient is not medically cleared for discharge.   Anticipate discharge to SNF.  I have placed the appropriate orders for post-discharge needs.    Review of Systems  Review of Systems   Constitutional:  Negative for chills and fever.   Eyes:  Negative for blurred vision and pain.   Respiratory:  Negative for cough and shortness of breath.    Cardiovascular:  Negative for chest pain, palpitations and leg swelling.   Gastrointestinal:  Negative for abdominal pain, nausea and vomiting.   Genitourinary:  Negative for dysuria and urgency.   Musculoskeletal:  Positive for joint pain. Negative for back pain.   Skin:  Negative for itching and rash.   Neurological:  Negative for dizziness, sensory change, focal weakness and headaches.   Psychiatric/Behavioral:  Negative for substance abuse. The patient does not have insomnia.       Physical Exam  Temp:  [36.6 °C (97.9 °F)-37 °C (98.6 °F)] 37 °C (98.6 °F)  Pulse:  [66-85] 79  Resp:  [16-18] 18  BP: ()/(56-79) 135/79  SpO2:  [92 %-94 %] 93 %    Physical Exam  Constitutional:       General: He is not in acute distress.     Appearance: He is not ill-appearing.   HENT:      Head: Normocephalic and atraumatic.      Right Ear: External ear normal.      Left Ear: External ear normal.      Mouth/Throat:      Pharynx: No oropharyngeal exudate or posterior oropharyngeal erythema.   Eyes:      Extraocular Movements:  Extraocular movements intact.      Pupils: Pupils are equal, round, and reactive to light.   Cardiovascular:      Rate and Rhythm: Normal rate and regular rhythm.      Pulses: Normal pulses.      Heart sounds: Normal heart sounds.   Pulmonary:      Effort: Pulmonary effort is normal. No respiratory distress.      Breath sounds: Normal breath sounds. No wheezing or rales.   Abdominal:      General: Bowel sounds are normal. There is no distension.      Palpations: Abdomen is soft.      Tenderness: There is no abdominal tenderness. There is no guarding or rebound.   Musculoskeletal:         General: Swelling present. No tenderness.      Cervical back: Normal range of motion and neck supple.      Right lower leg: No edema.      Left lower leg: No edema.      Comments: Left elbow with some inflammation, no open wounds or drainage   Skin:     General: Skin is warm and dry.   Neurological:      General: No focal deficit present.      Mental Status: He is oriented to person, place, and time.      Cranial Nerves: No cranial nerve deficit.      Sensory: No sensory deficit.      Motor: No weakness.   Psychiatric:         Mood and Affect: Mood normal.         Behavior: Behavior normal.       Fluids    Intake/Output Summary (Last 24 hours) at 11/14/2022 1217  Last data filed at 11/14/2022 0300  Gross per 24 hour   Intake 390 ml   Output --   Net 390 ml       Laboratory  Recent Labs     11/12/22  0120 11/13/22  0700 11/14/22  0156   WBC 16.5* 15.1* 12.7*   RBC 3.69* 3.79* 3.48*   HEMOGLOBIN 11.0* 11.4* 10.6*   HEMATOCRIT 33.8* 34.5* 31.9*   MCV 91.6 91.0 91.7   MCH 29.8 30.1 30.5   MCHC 32.5* 33.0* 33.2*   RDW 52.8* 52.6* 52.6*   PLATELETCT 235 266 238   MPV 10.4 10.5 10.4     Recent Labs     11/14/22  0156   SODIUM 135   POTASSIUM 2.8*   CHLORIDE 107   CO2 18*   GLUCOSE 103*   BUN 22   CREATININE 1.02   CALCIUM 8.2*                   Imaging  MR-ELBOW-WITH & W/O LEFT   Final Result         1. No MR evidence of osteomyelitis.    2. Subcutaneous edema along the posterior lateral elbow could relate to cellulitis. Mild diffuse increased signal throughout the underlying elbow muscles could relate to atrophy or myositis.   3. Small elbow joint effusion, nonspecific, and favored to represent reactive changes rather than septic arthritis.      CT-ELBOW WITH LEFT   Final Result      1.  No bony abnormality indicative of osteomyelitis.   2.  Diffuse soft tissue swelling and induration about the elbow and proximal forearm most consistent with superficial and deep cellulitis.   3.  Note, CT is suboptimal for soft tissue detail and MRI of the elbow could be pursued if clinically indicated for management and would also be preferable for detection of early osteomyelitis.      DX-CHEST-PORTABLE (1 VIEW)   Final Result      1.  Stable cardiomegaly and interstitial edema.   2.  Stable linear atelectasis in the right lower lung.         US-EXTREMITY NON VASCULAR UNILATERAL LEFT   Final Result      Soft tissue edema in the area of concern about the left elbow without evidence of fluid collection/abscess.      EC-ECHOCARDIOGRAM COMPLETE W/ CONT   Final Result      DX-ELBOW-COMPLETE 3+ LEFT   Final Result      Normal elbow series.      CT-ABDOMEN-PELVIS W/O   Final Result      1.  No evidence of bowel obstruction or focal inflammatory change.      2.  Gallstones.      3.  Mildly distended colon which also contains fluid. No evidence of bowel obstruction.      DX-CHEST-PORTABLE (1 VIEW)   Final Result      1.  Cardiomegaly. There is also interstitial edema.      2.  Right IJ central line present without evidence of complication.      3.  Linear atelectasis within the right lung base.      DX-CHEST-PORTABLE (1 VIEW)   Final Result      1.  Hazy opacity at the right lower lung, similar to 5/25/2021 exam. Scarring versus pneumonia.           Assessment/Plan  * Severe sepsis with septic shock (HCC)- (present on admission)  Assessment & Plan  Continue intravenous  antimicrobial therapy  Shock has resolved - he is off vasopressor (levophed).  Sepsis is improving  Taper hydrocortisone, 50 mg IV daily    Swelling of left elbow joint  Assessment & Plan  Edema and Erythema of Left Elbow Joint - Native Joint  Significant Tenderness to palpation.   CT elbow without signs of abscess or septic arthritis, shows cellulitis; continue antibiotics  MRI elbow shows no septic arthritis, does show joint effusion  Orthopedic surgery consulted      Acute on chronic heart failure (HCC)  Assessment & Plan  Hx of Nonischemic Cardiomyopathy  Lower Extremity Edema   BNP > 33,000  Echo from 2021 with EF of 55%  Repeat echo this admission revealed a EF of 25 to 30%  Metoprolol, lisinopril, lasix, spironolactone  Follow up with cardiology as outpatient    Streptococcal bacteremia  Assessment & Plan  Blood cultures positive for streptococcus, source unclear  Recent TTE negative for endocarditis  ID consulted and following          Continue IV Ancef 2g q 8 hrs   I discussed plan of care with him.    Thrombocytopenia (HCC)- (present on admission)  Assessment & Plan  Stable current platelet count is 137.  No signs of active bleeding.    Gastroenteritis  Assessment & Plan  Started 4 days prior to Admission  CT Abdomen unremarkable.  Improving slightly.  Will continue to monitor  Antidiarrheals prn      Bacteremia due to Gram-positive bacteria  Assessment & Plan  Group A streptococcus Bacteremia, source unclear   Was in Septic Shock  Initially given Fluid administration per sepsis protocol.   Started on Pressor Levophed on 11/5 -> D/Cd on 11/8.    Started on Hydrocortisone 11/6 - Stress dose, hx of adrenal insuff.  Abdominal CT and Chest Xray unremarkable.  TTE without evidence of endocarditis  UA mild UTI, culture negative to date.  Suspected left elbow as infection source.    ID consulted, pt started on cefepime and flagyl empirically.     On 11/6 daptomycin was added for Enterococcus coverage.    On 11/7  cefepime, flagyl, & daptomycin D/Cd. Started IV Ancef 2 g every 8 hours. Rpt Blood Cx obtained.     - Taper hydrocortisone, 50 mg IV daily  - Continue IV Ancef 2 g q8 hrs  - Continue to Follow Rpt Blood Cxs, negative to date.   CT elbow shows no discrete abscess or signs of septic arthritis; does show cellulitis  MRI elbow does not show signs of septic arthritis, does show joint effusion  Orthopedic surgery consulted, appreciate recommendations    Permanent atrial fibrillation (HCC)- (present on admission)  Assessment & Plan  Currently in atrial fibrillation with RVR.   Rate controlled  Continue amiodarone and metoprolol  Holding home eliquis pending plan for operative interventions, resume when appropriate    Hypothyroidism- (present on admission)  Assessment & Plan  Continue Synthroid    Acute kidney injury (HCC)  Assessment & Plan  Initially Creatinine of 3.13 and GFR of 22  -Avoid Nephrotoxins and Renally Dose Meds   Resolved with sepsis improvement      Nonischemic cardiomyopathy (HCC)  Assessment & Plan  Unclear etiology of CM  Left heart cath in 2018 was negative for CAD  EF 30% here, worse than prior  Continue home metoprolol, lisinopril; start aldactone  Follow up with cardiology as outpatient for further management    Obesity due to excess calories with serious comorbidity- (present on admission)  Assessment & Plan  Will need counseling when clinically appropriate    Hypokalemia- (present on admission)  Assessment & Plan  From diarrhea  Replace         VTE prophylaxis: SCDs    I have performed a physical exam and reviewed and updated ROS and Plan today (11/14/2022). In review of yesterday's note (11/13/2022), there are no changes except as documented above.

## 2022-11-14 NOTE — PROGRESS NOTES
Bedside report received from night RN, patient care assumed. Patient is medical. VSS, respirations even and unlabored. Pt assessment complete. Pt A&Ox4, No signs of distress noted at this time. Pt denies any additional needs at this time. Bed in lowest position, bed alarm on. No questions or concerns at this time. Pt educated on fall risk and verbalizes understanding, call light within reach, will continue to monitor.

## 2022-11-14 NOTE — CARE PLAN
The patient is Watcher - Medium risk of patient condition declining or worsening    Shift Goals  Clinical Goals: Q2H turns, skin care  Patient Goals: None  Family Goals: VON      Problem: Urinary - Renal Perfusion  Goal: Ability to achieve and maintain adequate renal perfusion and functioning will improve  Outcome: Progressing     Problem: Skin Integrity  Goal: Skin integrity is maintained or improved  Outcome: Progressing     Problem: Pain - Standard  Goal: Alleviation of pain or a reduction in pain to the patient’s comfort goal  Outcome: Progressing       Progress made toward(s) clinical / shift goals:  Multiple Bms, barrier paste applied with each pericare. Pt has been cooperative with turns.    Patient is not progressing towards the following goals:

## 2022-11-14 NOTE — PROGRESS NOTES
Superficial infectious Disease Progress Note    Author: Jane Anderson M.D. Date & Time of service: 2022  7:52 AM    Chief Complaint:  Follow-up for bacteremia    Interval History:   T-max 102.6 on , white count up to 15.1 today, on cefepime and Flagyl.  Continues to have left elbow pain   patient afebrile since antibiotic initiation, white count of 11,000, tolerated switch to Ancef.   patient remains afebrile, white count is 13.4, tolerating Ancef.  MRI remains pending.  Elbow pain much better  11/10 patient remains afebrile, count is still elevated, 14.6 today, MRI still pending   patient remains afebrile, CBC this morning, MRI is still pending.   patient remains afebrile, count is up to 16.5 today, CT scan was done which was superficial and deep cellulitis.  Continues to have elbow pain   patient remains afebrile, white count remains persistently elevated at 15.1.  Orthopedics requesting MRI   afebrile, WBC 12.7 MRI with no evidence of osteomyelitis however there was a small elbow joint infusion nonspecific representing reactive changes rather than septic arthritis.   Leukocytosis decreased today.  Patient continues to have significant pain and restricted mobility in his left elbow.  Patient states he had 3 loose bowel movements overnight.  Diarrhea started 4 days ago    Labs Reviewed and Medications Reviewed.    Review of Systems:  Review of Systems   Constitutional:  Negative for chills and fever.   Respiratory:  Negative for cough and shortness of breath.    Gastrointestinal:  Negative for abdominal pain, diarrhea, nausea and vomiting.   Musculoskeletal:  Positive for joint pain.   Skin:  Negative for itching and rash.   All other systems reviewed and are negative.    Hemodynamics:  Temp (24hrs), Av.8 °C (98.2 °F), Min:36.6 °C (97.9 °F), Max:37 °C (98.6 °F)  Temperature: 37 °C (98.6 °F), Monitored Temp: 36.8 °C (98.2 °F)  Pulse  Av.5  Min: 65  Max: 140    Blood Pressure: 129/73       Physical Exam:  Physical Exam  Vitals and nursing note reviewed.   Constitutional:       General: He is not in acute distress.     Appearance: He is ill-appearing.      Comments: Chronically ill-appearing   HENT:      Mouth/Throat:      Comments: Poor dentition  Eyes:      General: No scleral icterus.        Right eye: No discharge.         Left eye: No discharge.      Conjunctiva/sclera: Conjunctivae normal.   Cardiovascular:      Rate and Rhythm: Normal rate and regular rhythm.      Heart sounds: No murmur heard.  Pulmonary:      Effort: Pulmonary effort is normal. No respiratory distress.      Breath sounds: No stridor.   Abdominal:      General: Abdomen is flat. There is no distension.      Tenderness: There is no abdominal tenderness.   Musculoskeletal:         General: Swelling and tenderness present.      Comments: Left posterior elbow swelling, pain with passive motion.  Erythema improved   Skin:     Findings: Erythema present.   Neurological:      General: No focal deficit present.      Mental Status: He is alert and oriented to person, place, and time.   Psychiatric:      Comments: Flat affect       Meds:    Current Facility-Administered Medications:     potassium chloride SA    Special Contact Isolation **AND** C Diff by PCR rflx Toxin **AND** Pharmacy    lisinopril    metoprolol SR    tamsulosin    spironolactone    influenza vaccine quad    HYDROcodone-acetaminophen    [COMPLETED] amiodarone **FOLLOWED BY** amiodarone **FOLLOWED BY** [START ON 11/17/2022] amiodarone    ceFAZolin    insulin regular **AND** POC blood glucose manual result **AND** NOTIFY MD and PharmD **AND** Administer 20 grams of glucose (approximately 8 ounces of fruit juice) every 15 minutes PRN FSBG less than 70 mg/dL **AND** dextrose bolus    acetaminophen    levothyroxine    [Held by provider] apixaban    traZODone    Labs:  Recent Labs     11/12/22  0120 11/13/22  0700 11/14/22  0156   WBC 16.5* 15.1*  12.7*   RBC 3.69* 3.79* 3.48*   HEMOGLOBIN 11.0* 11.4* 10.6*   HEMATOCRIT 33.8* 34.5* 31.9*   MCV 91.6 91.0 91.7   MCH 29.8 30.1 30.5   RDW 52.8* 52.6* 52.6*   PLATELETCT 235 266 238   MPV 10.4 10.5 10.4   NEUTSPOLYS 81.40* 79.10* 82.60*   LYMPHOCYTES 8.80* 5.00* 7.80*   MONOCYTES 5.30 4.20 4.40   EOSINOPHILS 2.70 0.00 2.60   BASOPHILS 0.00 0.80 0.90   RBCMORPHOLO Present Present Present       Recent Labs     11/14/22 0156   SODIUM 135   POTASSIUM 2.8*   CHLORIDE 107   CO2 18*   GLUCOSE 103*   BUN 22       Recent Labs     11/14/22 0156   ALBUMIN 2.7*   TBILIRUBIN 1.1   ALKPHOSPHAT 131*   TOTPROTEIN 6.0   ALTSGPT 6   ASTSGOT 26   CREATININE 1.02         Imaging:  CT-ABDOMEN-PELVIS W/O    Result Date: 11/5/2022 11/5/2022 9:05 AM HISTORY/REASON FOR EXAM:  Abdominal distention. TECHNIQUE/EXAM DESCRIPTION: CT scan of the abdomen and pelvis without contrast. Noncontrast helical scanning was obtained from the diaphragmatic domes through the pubic symphysis. Low dose optimization technique was utilized for this CT exam including automated exposure control and adjustment of the mA and/or kV according to patient size. COMPARISON: None. FINDINGS: Lower Chest: There is bibasilar atelectasis.. Liver: Normal. Spleen: Calcified granulomas. Pancreas: Atrophic. Gallbladder: There are multiple gallstones. Biliary: Nondilated. Adrenal glands: Normal. Kidneys: Unremarkable without hydronephrosis. Bowel: The colon is mildly distended and contains fluid.. Lymph nodes: No adenopathy. Vasculature: Unremarkable. Peritoneum: Unremarkable without ascites. Musculoskeletal: There are chronic compression fractures of the spine.. Pelvis: No adenopathy or free fluid.     1.  No evidence of bowel obstruction or focal inflammatory change. 2.  Gallstones. 3.  Mildly distended colon which also contains fluid. No evidence of bowel obstruction.    DX-CHEST-PORTABLE (1 VIEW)    Result Date: 11/7/2022 11/7/2022 12:04 AM HISTORY/REASON FOR EXAM:   sepsis. TECHNIQUE/EXAM DESCRIPTION AND NUMBER OF VIEWS: Single portable view of the chest. COMPARISON:  11/5/2022. FINDINGS: LUNGS: Linear atelectasis in the right lower lung. No focal consolidation. Persistent interstitial edema. No effusions. PNEUMOTHORAX: None. LINES AND TUBES: Stable right IJ central catheter. MEDIASTINUM: Stable cardiac silhouette. MUSCULOSKELETAL STRUCTURES: Unchanged.     1.  Stable cardiomegaly and interstitial edema. 2.  Stable linear atelectasis in the right lower lung.     DX-CHEST-PORTABLE (1 VIEW)    Result Date: 11/5/2022 11/5/2022 8:17 AM HISTORY/REASON FOR EXAM: Central line placement TECHNIQUE/EXAM DESCRIPTION AND NUMBER OF VIEWS: Single portable view of the chest. COMPARISON: 11/5/2022 FINDINGS: There is a right IJ central line present with the tip overlying the upper right atrium. No evidence of pneumothorax. There is interstitial edema. There is linear atelectasis within the right midlung. There is no pleural effusion. The heart is enlarged.     1.  Cardiomegaly. There is also interstitial edema. 2.  Right IJ central line present without evidence of complication. 3.  Linear atelectasis within the right lung base.    DX-CHEST-PORTABLE (1 VIEW)    Result Date: 11/5/2022 11/5/2022 3:40 AM HISTORY/REASON FOR EXAM:  Sepsis TECHNIQUE/EXAM DESCRIPTION AND NUMBER OF VIEWS: Single portable view of the chest. COMPARISON: 5/25/2021 FINDINGS: HEART: Not enlarged. LUNGS: Hazy opacity right lower lung. PLEURA: No effusion or pneumothorax.     1.  Hazy opacity at the right lower lung, similar to 5/25/2021 exam. Scarring versus pneumonia.    DX-ELBOW-COMPLETE 3+ LEFT    Result Date: 11/5/2022 11/5/2022 11:29 AM HISTORY/REASON FOR EXAM:  Pain/Deformity Following Trauma TECHNIQUE/EXAM DESCRIPTION AND NUMBER OF VIEWS:  3 views of the LEFT elbow. COMPARISON: None FINDINGS: There is no evidence of joint effusion. There is no evidence of displaced fracture or dislocation. There is no focal soft  tissue swelling.     Normal elbow series.    US-EXTREMITY NON VASCULAR UNILATERAL LEFT    Result Date: 2022 12:21 PM HISTORY/REASON FOR EXAM:  Left elbow swelling, redness, pain TECHNIQUE/EXAM DESCRIPTION AND NUMBER OF VIEWS: Ultrasound extremity nonvascular unilateral COMPARISON: X-ray yesterday FINDINGS: There is edema. No fluid collection identified.     Soft tissue edema in the area of concern about the left elbow without evidence of fluid collection/abscess.    EC-ECHOCARDIOGRAM COMPLETE W/ CONT    Result Date: 2022  Transthoracic Echo Report Echocardiography Laboratory CONCLUSIONS Severely reduced left ventricular systolic function. The left ventricular ejection fraction is visually estimated to be 25- 30%, but may be underestimated due to tachycardia. Global hypokinesis with more pronounced hypokinesis of the anterior and anterolateral walls. Diastolic function is abnormal, but grade cannot be determined. The right ventricle is not well visualized, but appears dilated with reduced systolic function. Biatrial dilation. Mild to moderate tricuspid regurgitation. Dilated inferior vena cava without inspiratory collapse. EMELY FLORES Exam Date:         2022                    11:46 Exam Location:     Inpatient Priority:          Routine Ordering Physician:        LESLIE ARMENTA Referring Physician: Sonographer:               Sheron Baxter RDCS Age:    57     Gender:    M MRN:    [id number] :    1965 BSA:    2.49   Ht (in):    72     Wt (lb):    290 Exam Type:     Complete, Contrast Indications:     Heart Failure, Systolic ICD Codes:       428.2 CPT Codes:       68459,  BP:   90     /   52     HR:   120 Technical Quality:       Fair MEASUREMENTS  (Male / Female) Normal Values 2D ECHO LV Diastolic Diameter PLAX        5.1 cm                4.2 - 5.9 / 3.9 - 5.3 cm LV Systolic Diameter PLAX         4.8 cm                2.1 - 4.0 cm IVS Diastolic Thickness           1 cm                   LVPW Diastolic Thickness          0.83 cm               LVOT Diameter                     2.2 cm                Estimated LV Ejection Fraction    30 %                  LV Ejection Fraction MOD BP       39.3 %                >= 55  % LV Ejection Fraction MOD 4C       41.2 %                LV Ejection Fraction MOD 2C       36.1 %                IVC Diameter                      2.5 cm                DOPPLER AV Peak Velocity                  1.2 m/s               AV Peak Gradient                  6 mmHg                AV Mean Gradient                  3.8 mmHg              LVOT Peak Velocity                0.85 m/s              AV Area Cont Eq vti               2.4 cm2               TV Peak E Velocity                0.51 m/s              TR Peak Velocity                  290 cm/s              * Indicates values subject to auto-interpretation LV EF:  30    % FINDINGS Left Ventricle Normal left ventricular chamber size. Normal left ventricular wall thickness. Severely reduced left ventricular systolic function. The left ventricular ejection fraction is visually estimated to be 25-30%, but may be underestimated due to tachycardia. Global hypokinesis with more pronounced hypokinesis of the anterior and anterolateral walls. Diastolic function is abnormal, but grade cannot be determined. 3 mL of contrast was used to enhance visualization of the endocardial border. Existing IV was used at the left elbow. Right Ventricle The right ventricle is not well visualized, but appears dilated with reduced systolic function. Estimated right ventricular systolic pressure is 50 mmHg Right Atrium The right atrium is not well visualized, but appears dilated. Dilated inferior vena cava without inspiratory collapse. Left Atrium The left atrium is not well visualized, but appears dilated. Mitral Valve The mitral valve is not well visualized. No stenosis or regurgitation seen. Aortic Valve The aortic valve is not well visualized.  "No stenosis or regurgitation seen. Tricuspid Valve No tricuspid stenosis. Mild to moderate tricuspid regurgitation. Pulmonic Valve Structurally normal pulmonic valve without significant stenosis or regurgitation. Pericardium Normal pericardium without effusion. Aorta Normal aortic root for body surface area. The ascending aorta diameter is 3.5 cm. Michael Acosta MD (Electronically Signed) Final Date:     05 November 2022                 14:02      Micro:  Results       Procedure Component Value Units Date/Time    C Diff by PCR rflx Toxin [[id number]]     Order Status: No result Specimen: Stool     BLOOD CULTURE [[id number]] Collected: 11/07/22 1053    Order Status: Completed Specimen: Blood from Peripheral Updated: 11/12/22 1300     Significant Indicator NEG     Source BLD     Site PERIPHERAL     Culture Result No growth after 5 days of incubation.    Narrative:      Per Hospital Policy: Only change Specimen Src: to \"Line\" if  specified by physician order.  No site indicated    BLOOD CULTURE [[id number]] Collected: 11/07/22 1102    Order Status: Completed Specimen: Blood from Peripheral Updated: 11/12/22 1300     Significant Indicator NEG     Source BLD     Site PERIPHERAL     Culture Result No growth after 5 days of incubation.    Narrative:      Per Hospital Policy: Only change Specimen Src: to \"Line\" if  specified by physician order.  No site indicated    CULTURE STOOL [[id number]] Collected: 11/05/22 0450    Order Status: Completed Specimen: Stool Updated: 11/08/22 1518     Significant Indicator NEG     Source STL     Site STOOL     Culture Result No enteric pathogens isolated.  NOTE:  Stool cultures are screened for Shiga Toxins 1 and 2,  Salmonella, Shigella, Campylobacter, Aeromonas,  Plesiomonas, and Vibrio.       [facility] Negative for Shiga Toxin 1 and 2.     Campylobactor Antigen --     Negative for Campylobacter Antigen.  Test results are to be used in conjunction with information  available from the patient " "clinical evaluation and other  diagnostic procedures.      Narrative:      For adult patients only; culture includes screen for  Campylobacter.  For adult patients only; culture includes screen for    Blood Culture [[id number]]  (Abnormal)  (Susceptibility) Collected: 11/05/22 0414    Order Status: Completed Specimen: Blood from Peripheral Updated: 11/07/22 1114     Significant Indicator POS     Source BLD     Site PERIPHERAL     Culture Result Growth detected by Bactec instrument. 11/05/2022  15:42      Streptococcus pyogenes (Group A)  This isolate does NOT demonstrate inducible Clindamycin  resistance in vitro.      Narrative:      CALL  [place]  161 tel. [phone],  CALLED  161 tel. [phone] 11/06/2022, 12:01, RB PERF. RESULTS CALLED TO:  [id number]  1 of 2 for Blood Culture x 2 sites order. Per Hospital  Policy: Only change Specimen Src: to \"Line\" if specified by  physician order.  Left Hand    Susceptibility       Streptococcus pyogenes (group a) (1)       Antibiotic Interpretation Microscan   Method Status    Penicillin Sensitive 0.012 mcg/mL E-TEST Final    Cefotaxime Sensitive 0.012 mcg/mL E-TEST Final    Clindamycin Sensitive - mcg/mL E-TEST Final                       Blood Culture [[id number]]  (Abnormal) Collected: 11/05/22 0415    Order Status: Completed Specimen: Blood from Peripheral Updated: 11/07/22 1102     Significant Indicator POS     Source BLD     Site PERIPHERAL     Culture Result Growth detected by Bactec instrument. 11/05/2022  14:27      Streptococcus pyogenes (Group A)  See previous culture for sensitivity report.      Narrative:      CALL  [place]  161 tel. [phone],  CALLED  161 tel. [phone] 11/05/2022, 14:52, RB PERF. RESULTS CALLED  TO:[id number]  2 of 2 blood culture x2  Sites order. Per Hospital Policy:  Only change Specimen Src: to \"Line\" if specified by physician  order.  Right Wrist    E-Test [[id number]] Collected: 11/05/22 0414    Order Status: Completed Specimen: Other Updated: 11/07/22 " "1100     ETEST Sensitivity FINAL    Narrative:      161 tel. [phone] 11/06/2022, 12:01, RB PERF. RESULTS CALLED TO: [id number]  1 of 2 for Blood Culture x 2 sites order. Per Hospital  Policy: Only change Specimen Src: to \"Line\" if specified by  physician order.    Urine Culture (NEW) [[id number]] Collected: 11/05/22 0500    Order Status: Completed Specimen: Urine Updated: 11/07/22 0950     Significant Indicator NEG     Source UR     Site -     Culture Result No growth at 48 hours.    Narrative:      Indication for culture:->Evaluation for sepsis without a  clear source of infection  Indication for culture:->Evaluation for sepsis without a            Assessment:  This is a 57-year-old male patient with type 2 diabetes, nonischemic cardiomyopathy, A. fib on amiodarone and Eliquis, prior hospitalization for bowel perforation, presented with 4 days of rigors, watery diarrhea, fevers has a chronic superficial left heel wound, was found to be in septic shock with acute renal failure and lactic acidosis, blood cultures positive for GPC in chains.  CT scan of the abdomen pelvis with no inflammation.  Chest x-ray with interstitial opacities.  TTE with EF of 25 to 30%, no obvious valvular vegetations were noted.    Pertinent diagnoses:  Group A streptococcal bacteremia  Left elbow effusion, concerning for septic arthritis/bursitis  Leukocytosis, persistent.  Improved today  Type 2 diabetes mellitus  Atrial fibrillation on amiodarone and Eliquis  Nonischemic cardiomyopathy  Diarrhea    Plan:  -Continue IV cefazolin 2 g every 8 hours  -Blood cultures on 11/5 positive for Group A Strep  -Repeat blood cultures on 11/7-negative to date  -Multiple allergies including Unasyn, Zosyn, patient prefers not to attempt challenge.    -MRI without any evidence of osteomyelitis; subcutaneous edema along the posterior lateral elbow possibly related to cellulitis.  Small elbow joint effusion  -Repeat blood cultures x2 from 11/7 no growth to " date  -Recommend left olecranon joint aspiration  -C. difficile PCR-pending    Discussed with internal medicine, Dr. Wills

## 2022-11-14 NOTE — DISCHARGE PLANNING
Agency/Facility Name: [facility]  Outcome: DPA left Vmail regarding possible medical clearance later today with request of call back.     1029  Agency/Facility Name: [facility]   Spoke To: Glenna   Outcome: [facility] called to inform [facility] if Pt is medically cleared, [facility] will have a bed for Pt. DPA to call [facility] with updates.

## 2022-11-15 ENCOUNTER — APPOINTMENT (OUTPATIENT)
Dept: RADIOLOGY | Facility: MEDICAL CENTER | Age: 57
DRG: 871 | End: 2022-11-15
Attending: INTERNAL MEDICINE
Payer: MEDICARE

## 2022-11-15 LAB
ANION GAP SERPL CALC-SCNC: 8 MMOL/L (ref 7–16)
BUN SERPL-MCNC: 22 MG/DL (ref 8–22)
CALCIUM SERPL-MCNC: 8.1 MG/DL (ref 8.5–10.5)
CHLORIDE SERPL-SCNC: 106 MMOL/L (ref 96–112)
CO2 SERPL-SCNC: 20 MMOL/L (ref 20–33)
CREAT SERPL-MCNC: 0.99 MG/DL (ref 0.5–1.4)
ERYTHROCYTE [DISTWIDTH] IN BLOOD BY AUTOMATED COUNT: 53.4 FL (ref 35.9–50)
GFR SERPLBLD CREATININE-BSD FMLA CKD-EPI: 87 ML/MIN/1.73 M 2
GLUCOSE BLD STRIP.AUTO-MCNC: 103 MG/DL (ref 65–99)
GLUCOSE BLD STRIP.AUTO-MCNC: 106 MG/DL (ref 65–99)
GLUCOSE BLD STRIP.AUTO-MCNC: 108 MG/DL (ref 65–99)
GLUCOSE BLD STRIP.AUTO-MCNC: 99 MG/DL (ref 65–99)
GLUCOSE SERPL-MCNC: 112 MG/DL (ref 65–99)
HCT VFR BLD AUTO: 33.6 % (ref 42–52)
HGB BLD-MCNC: 10.8 G/DL (ref 14–18)
MCH RBC QN AUTO: 29.5 PG (ref 27–33)
MCHC RBC AUTO-ENTMCNC: 32.1 G/DL (ref 33.7–35.3)
MCV RBC AUTO: 91.8 FL (ref 81.4–97.8)
PLATELET # BLD AUTO: 269 K/UL (ref 164–446)
PMV BLD AUTO: 10.2 FL (ref 9–12.9)
POTASSIUM SERPL-SCNC: 3.6 MMOL/L (ref 3.6–5.5)
RBC # BLD AUTO: 3.66 M/UL (ref 4.7–6.1)
SODIUM SERPL-SCNC: 134 MMOL/L (ref 135–145)
WBC # BLD AUTO: 11.8 K/UL (ref 4.8–10.8)

## 2022-11-15 PROCEDURE — 99232 SBSQ HOSP IP/OBS MODERATE 35: CPT | Performed by: INTERNAL MEDICINE

## 2022-11-15 PROCEDURE — A9270 NON-COVERED ITEM OR SERVICE: HCPCS | Performed by: STUDENT IN AN ORGANIZED HEALTH CARE EDUCATION/TRAINING PROGRAM

## 2022-11-15 PROCEDURE — 700105 HCHG RX REV CODE 258: Performed by: INTERNAL MEDICINE

## 2022-11-15 PROCEDURE — 700102 HCHG RX REV CODE 250 W/ 637 OVERRIDE(OP): Performed by: INTERNAL MEDICINE

## 2022-11-15 PROCEDURE — 80048 BASIC METABOLIC PNL TOTAL CA: CPT

## 2022-11-15 PROCEDURE — 700102 HCHG RX REV CODE 250 W/ 637 OVERRIDE(OP): Performed by: STUDENT IN AN ORGANIZED HEALTH CARE EDUCATION/TRAINING PROGRAM

## 2022-11-15 PROCEDURE — 36415 COLL VENOUS BLD VENIPUNCTURE: CPT

## 2022-11-15 PROCEDURE — 85027 COMPLETE CBC AUTOMATED: CPT

## 2022-11-15 PROCEDURE — 700111 HCHG RX REV CODE 636 W/ 250 OVERRIDE (IP): Performed by: INTERNAL MEDICINE

## 2022-11-15 PROCEDURE — 82962 GLUCOSE BLOOD TEST: CPT

## 2022-11-15 PROCEDURE — 770001 HCHG ROOM/CARE - MED/SURG/GYN PRIV*

## 2022-11-15 PROCEDURE — A9270 NON-COVERED ITEM OR SERVICE: HCPCS | Performed by: INTERNAL MEDICINE

## 2022-11-15 PROCEDURE — 05HY33Z INSERTION OF INFUSION DEVICE INTO UPPER VEIN, PERCUTANEOUS APPROACH: ICD-10-PCS | Performed by: INTERNAL MEDICINE

## 2022-11-15 PROCEDURE — 76937 US GUIDE VASCULAR ACCESS: CPT

## 2022-11-15 PROCEDURE — 99233 SBSQ HOSP IP/OBS HIGH 50: CPT | Performed by: INTERNAL MEDICINE

## 2022-11-15 RX ADMIN — CEFAZOLIN 2 G: 2 INJECTION, POWDER, FOR SOLUTION INTRAMUSCULAR; INTRAVENOUS at 06:29

## 2022-11-15 RX ADMIN — METOPROLOL SUCCINATE 50 MG: 50 TABLET, EXTENDED RELEASE ORAL at 06:28

## 2022-11-15 RX ADMIN — CEFAZOLIN 2 G: 2 INJECTION, POWDER, FOR SOLUTION INTRAMUSCULAR; INTRAVENOUS at 14:04

## 2022-11-15 RX ADMIN — SPIRONOLACTONE 25 MG: 25 TABLET ORAL at 06:29

## 2022-11-15 RX ADMIN — LISINOPRIL 20 MG: 20 TABLET ORAL at 06:29

## 2022-11-15 RX ADMIN — TRAZODONE HYDROCHLORIDE 50 MG: 50 TABLET ORAL at 21:18

## 2022-11-15 RX ADMIN — CEFAZOLIN 2 G: 2 INJECTION, POWDER, FOR SOLUTION INTRAMUSCULAR; INTRAVENOUS at 21:21

## 2022-11-15 RX ADMIN — AMIODARONE HYDROCHLORIDE 400 MG: 200 TABLET ORAL at 06:29

## 2022-11-15 RX ADMIN — APIXABAN 5 MG: 5 TABLET, FILM COATED ORAL at 18:50

## 2022-11-15 RX ADMIN — LEVOTHYROXINE SODIUM 150 MCG: 0.07 TABLET ORAL at 06:28

## 2022-11-15 RX ADMIN — TAMSULOSIN HYDROCHLORIDE 0.4 MG: 0.4 CAPSULE ORAL at 09:14

## 2022-11-15 ASSESSMENT — ENCOUNTER SYMPTOMS
FOCAL WEAKNESS: 0
VOMITING: 0
DIARRHEA: 0
SENSORY CHANGE: 0
EYE PAIN: 0
FEVER: 0
PALPITATIONS: 0
INSOMNIA: 0
CHILLS: 0
HEADACHES: 0
SHORTNESS OF BREATH: 0
NAUSEA: 0
BACK PAIN: 0
DIZZINESS: 0
COUGH: 0
ABDOMINAL PAIN: 0
BLURRED VISION: 0

## 2022-11-15 ASSESSMENT — LIFESTYLE VARIABLES: SUBSTANCE_ABUSE: 0

## 2022-11-15 NOTE — PROGRESS NOTES
Orthopaedic PA Progress Note    Interval changes:asked by hospitalist to consider repeat aspiration left elbow. ID note, suggesting olecranon aspiration, read and appreciated. Dr. Brooks contacted for further advice.    ROS - Patient denies any new issues. No chest pain, dyspnea, or fever.  Pain well controlled.    /70   Pulse 81   Temp 37.6 °C (99.7 °F) (Temporal)   Resp 18   Ht 1.829 m (6')   Wt (!) 136 kg (299 lb 9.7 oz)   SpO2 91%     Patient seen and examined  No acute distress  Breathing non labored  RRR  Left upper extremity warm and swollen. Patchy maculapapular rash presenting over anterior distal arm. There is no olecranon tenderness nor a balottable or fluctuant mass. Tinel's negative over olecranon process. {patient indicated antecubital fossa as sore spot). Elbow/shoulder/wrist ROM is symmetric BUE's.     Recent Labs     11/13/22  0700 11/14/22  0156 11/15/22  0219   WBC 15.1* 12.7* 11.8*   RBC 3.79* 3.48* 3.66*   HEMOGLOBIN 11.4* 10.6* 10.8*   HEMATOCRIT 34.5* 31.9* 33.6*   MCV 91.0 91.7 91.8   MCH 30.1 30.5 29.5   MCHC 33.0* 33.2* 32.1*   RDW 52.6* 52.6* 53.4*   PLATELETCT 266 238 269   MPV 10.5 10.4 10.2       Active Hospital Problems    Diagnosis     Permanent atrial fibrillation (HCC) [I48.21]      Priority: Medium    Hypothyroidism [E03.9]      Priority: Medium    Bacteremia due to Gram-positive bacteria [R78.81]      Priority: Low    Acute kidney injury (HCC) [N17.9]      Priority: Low    Nonischemic cardiomyopathy (HCC) [I42.8]      Priority: Low    Hypokalemia [E87.6]      Priority: Low    Hyponatremia [E87.1]      Priority: Low    Obesity due to excess calories with serious comorbidity [E66.09]      Priority: Low    Streptococcal bacteremia [R78.81, B95.5]     Acute on chronic heart failure (HCC) [I50.9]     Swelling of left elbow joint [M25.422]     Gastroenteritis [K52.9]     Severe sepsis with septic shock (HCC) [A41.9, R65.21]     Thrombocytopenia (HCC) [D69.6]       Assessment/Plan:  L elbow cellulitis. Strep. Bacteremia.  Per MRI:   1. No MR evidence of osteomyelitis.   2. Subcutaneous edema along the posterior lateral elbow could relate to cellulitis. Mild diffuse increased signal throughout the underlying elbow muscles could relate to atrophy or myositis.   3. Small elbow joint effusion, nonspecific, and favored to represent reactive changes rather than septic arthritis.  L elbow aspiration performed by Dr. Brooks at bedside with no fluid yield.    After team discussion this morning we recommend ID team consult Interventional radiology for image guided drainage, should IR think that is feasible.    Ice and/or heat may be helpful for symptomatic relief as an adjunct to oral analgesia - Med team to manage.    Please recontact our service at the conclusion of the IR-guided procedure, should one be performed.

## 2022-11-15 NOTE — DISCHARGE PLANNING
Case Management Discharge Planning    Admission Date: 11/5/2022  GMLOS: 5  ALOS: 10    6-Clicks ADL Score: 15  6-Clicks Mobility Score: 11  PT and/or OT Eval ordered: Yes  Post-acute Referrals Ordered: Yes  Post-acute Choice Obtained: Yes  Has referral(s) been sent to post-acute provider:  Yes      Anticipated Discharge Dispo: Discharge Disposition: D/T to SNF with medicare cert w/planned hosp IP readmit (83)  Discharge Address: [facility]    DME Needed: No    Action(s) Taken: Choice obtained    SW me with patient and obtained Choice for SNF. Patient stated that he currently resides at [facility] and would like to return there. Choice faxed to [facility] for processing.     Escalations Completed: None    Medically Clear: No    Next Steps: SW will follow-up with patient as needed.     Barriers to Discharge: None    Is the patient up for discharge tomorrow: No

## 2022-11-15 NOTE — THERAPY
Physical Therapy   Daily Treatment     Patient Name: Sebastián Castro  Age:  57 y.o., Sex:  male  Medical Record #: [id number]  Today's Date: 11/14/2022     Precautions  Precautions: Fall Risk    Assessment    Pt seen for follow up PT tx. Pt able to transfer from supine>sitting with min assist. Once EOB, pt reported mild dizziness, instructed on seated LE exercises which slightly improved symptoms. 3 STS performed with CGA from EOB to FWW. In standing pt participated in pre gait activities including marching, side stepping, and stepping forward and back. Pt declined transfer to chair due to dizziness () and declined further gait. Encouraged pt to sit EOB for meals. PT will cont while pt is in acute care setting.     Plan    Continue current treatment plan.    DC Equipment Recommendations: Unable to determine at this time  Discharge Recommendations: Recommend post-acute placement for additional physical therapy services prior to discharge home (return to [facility])         11/14/22 1622   Cognition    Level of Consciousness Alert   Comments cooperative   Sitting Lower Body Exercises   Sitting Lower Body Exercises Yes   Long Arc Quad 2 sets of 10;Bilateral   Marching Reciprocal;2 sets of 10   Sit to Stand   (3x from EOB)   Comments encouraged to sit for meals EOB   Neuro-Muscular Treatments   Neuro-Muscular Treatments Weight Shift Left;Weight Shift Right;Verbal Cuing;Anterior weight shift;Compensatory Strategies;Postural Facilitation   Comments standing weight shifting and pre gait   Other Treatments   Other Treatments Provided pre-gait activities including marching, side stepping and stepping forward and back   Balance   Sitting Balance (Static) Fair +   Sitting Balance (Dynamic) Fair   Standing Balance (Static) Fair -   Standing Balance (Dynamic) Fair -   Weight Shift Sitting Fair   Weight Shift Standing Fair   Skilled Intervention Verbal Cuing;Compensatory Strategies   Comments EOB, STS 3x, pre gait   Gait  Analysis   Gait Level Of Assist Contact Guard Assist   Assistive Device Front Wheel Walker   Distance (Feet) 3   # of Times Distance was Traveled 2   Deviation Increased Base Of Support;Shuffled Gait;Decreased Heel Strike;Decreased Toe Off   Skilled Intervention Verbal Cuing;Compensatory Strategies   Comments pre gait including marching, side stepping and stepping forward and back with FWW. Pt declined further gait due to dizziness,    Bed Mobility    Supine to Sit Minimal Assist   Sit to Supine Minimal Assist   Scooting Minimal Assist   Skilled Intervention Verbal Cuing;Compensatory Strategies   Comments use of bed rails, HOB slightly raised   Functional Mobility   Sit to Stand Contact Guard Assist   Bed, Chair, Wheelchair Transfer Refused   Transfer Method Stand Step   Mobility in room with FWW   Skilled Intervention Verbal Cuing;Compensatory Strategies   Short Term Goals    Short Term Goal # 1 Pt will perform bed mobility with supervision to progress function in 6 visits.   Goal Outcome # 1 goal not met   Short Term Goal # 2 Pt will perform functional transfers with FWW and supervision to progress mobility in 6 visits.   Goal Outcome # 2 Goal not met   Short Term Goal # 3 Pt will ambulate 50ft with FWW and min A to progress mobility in 6 visits.   Goal Outcome # 3 Goal not met   Anticipated Discharge Equipment and Recommendations   DC Equipment Recommendations Unable to determine at this time   Discharge Recommendations Recommend post-acute placement for additional physical therapy services prior to discharge home  (return to [facility])

## 2022-11-15 NOTE — HEART FAILURE PROGRAM
Per discharge plan notes, patient is likely to return to [facility] where he lives. The following appointment is appropriate for this as SNF will bring him to the appointment.      Nov 21, 2022  1:15 PM   Heart Failure New Patient with Presley Camara M.D.   [facility] (--) [address], [address]   [place] [address]   [phone]      Nov 29, 2022 10:00 AM   Previous Patient with Eric Lemos M.D.   [facility] (--) [address]   [place] [address]   [phone]      Dec 23, 2022 12:15 PM   EC ECHO with [facility] EXAM 12 ECHO   [facility] IMAGING - ECHOCARDIOLOGY - [facility] [address] [place] [address]   [phone]

## 2022-11-15 NOTE — CARE PLAN
The patient is Stable - Low risk of patient condition declining or worsening    Shift Goals  Clinical Goals: mobilize and treat pain  Patient Goals: pain control and appetite  Family Goals: VON    Progress made toward(s) clinical / shift goals:    Problem: Knowledge Deficit - Standard  Goal: Patient and family/care givers will demonstrate understanding of plan of care, disease process/condition, diagnostic tests and medications  Outcome: Progressing     Problem: Fluid Volume  Goal: Fluid volume balance will be maintained  Outcome: Progressing     Problem: Urinary - Renal Perfusion  Goal: Ability to achieve and maintain adequate renal perfusion and functioning will improve  Outcome: Progressing     Problem: Respiratory  Goal: Patient will achieve/maintain optimum respiratory ventilation and gas exchange  Outcome: Progressing     Problem: Physical Regulation  Goal: Diagnostic test results will improve  Outcome: Progressing  Goal: Signs and symptoms of infection will decrease  Outcome: Progressing     Problem: Skin Integrity  Goal: Skin integrity is maintained or improved  Outcome: Progressing     Problem: Fall Risk  Goal: Patient will remain free from falls  Outcome: Progressing     Problem: Pain - Standard  Goal: Alleviation of pain or a reduction in pain to the patient’s comfort goal  Outcome: Progressing       Patient is not progressing towards the following goals:

## 2022-11-15 NOTE — PROGRESS NOTES
Superficial infectious Disease Progress Note    Author: Jane Anderson M.D. Date & Time of service: 11/15/2022  8:43 AM    Chief Complaint:  Follow-up for group A strep bacteremia    Interval History:  11/7 T-max 102.6 on 11/5, white count up to 15.1 today, on cefepime and Flagyl.  Continues to have left elbow pain  11/8 patient afebrile since antibiotic initiation, white count of 11,000, tolerated switch to Ancef.  11/9 patient remains afebrile, white count is 13.4, tolerating Ancef.  MRI remains pending.  Elbow pain much better  11/10 patient remains afebrile, count is still elevated, 14.6 today, MRI still pending  11/11 patient remains afebrile, CBC this morning, MRI is still pending.  11/12 patient remains afebrile, count is up to 16.5 today, CT scan was done which was superficial and deep cellulitis.  Continues to have elbow pain  11/13 patient remains afebrile, white count remains persistently elevated at 15.1.  Orthopedics requesting MRI  11/14 afebrile, WBC 12.7 MRI with no evidence of osteomyelitis however there was a small elbow joint infusion nonspecific representing reactive changes rather than septic arthritis.   Leukocytosis decreased today.  Patient continues to have significant pain and restricted mobility in his left elbow.  Patient states he had 3 loose bowel movements overnight.  Diarrhea started 4 days ago  11/15 T-max 99.7, WBC 11.8 patient continues to have significant left elbow pain, swelling and restricted range of motion.  Ongoing diarrhea    Labs Reviewed and Medications Reviewed.    Review of Systems:  Review of Systems   Constitutional:  Negative for chills and fever.   Respiratory:  Negative for cough and shortness of breath.    Gastrointestinal:  Negative for abdominal pain, diarrhea, nausea and vomiting.   Musculoskeletal:  Positive for joint pain.        Left elbow   Skin:  Negative for itching and rash.   All other systems reviewed and are negative.    Hemodynamics:  Temp  (24hrs), Av.1 °C (98.8 °F), Min:36.6 °C (97.9 °F), Max:37.6 °C (99.7 °F)  Temperature: 37.6 °C (99.7 °F), Monitored Temp: 36.6 °C (97.9 °F)  Pulse  Av.2  Min: 65  Max: 140   Blood Pressure: 133/70       Physical Exam:  Physical Exam  Vitals and nursing note reviewed.   Constitutional:       General: He is not in acute distress.     Appearance: He is ill-appearing.      Comments: Chronically ill-appearing   HENT:      Mouth/Throat:      Comments: Poor dentition  Eyes:      General: No scleral icterus.        Right eye: No discharge.         Left eye: No discharge.      Conjunctiva/sclera: Conjunctivae normal.   Cardiovascular:      Rate and Rhythm: Normal rate and regular rhythm.      Heart sounds: No murmur heard.  Pulmonary:      Effort: Pulmonary effort is normal. No respiratory distress.      Breath sounds: No stridor.   Abdominal:      General: Abdomen is flat. There is no distension.      Tenderness: There is no abdominal tenderness.   Musculoskeletal:         General: Swelling and tenderness present.      Comments: Left posterior elbow swelling, pain with passive motion.  Erythema improved   Skin:     Findings: Erythema present.   Neurological:      General: No focal deficit present.      Mental Status: He is alert and oriented to person, place, and time.   Psychiatric:      Comments: Flat affect       Meds:    Current Facility-Administered Medications:     Special Contact Isolation **AND** [CANCELED] C Diff by PCR rflx Toxin **AND** Pharmacy    lisinopril    metoprolol SR    tamsulosin    spironolactone    influenza vaccine quad    HYDROcodone-acetaminophen    [COMPLETED] amiodarone **FOLLOWED BY** amiodarone **FOLLOWED BY** [START ON 2022] amiodarone    ceFAZolin    insulin regular **AND** POC blood glucose manual result **AND** NOTIFY MD and PharmD **AND** Administer 20 grams of glucose (approximately 8 ounces of fruit juice) every 15 minutes PRN FSBG less than 70 mg/dL **AND** dextrose  bolus    acetaminophen    levothyroxine    [Held by provider] apixaban    traZODone    Labs:  Recent Labs     11/13/22  0700 11/14/22  0156 11/15/22  0219   WBC 15.1* 12.7* 11.8*   RBC 3.79* 3.48* 3.66*   HEMOGLOBIN 11.4* 10.6* 10.8*   HEMATOCRIT 34.5* 31.9* 33.6*   MCV 91.0 91.7 91.8   MCH 30.1 30.5 29.5   RDW 52.6* 52.6* 53.4*   PLATELETCT 266 238 269   MPV 10.5 10.4 10.2   NEUTSPOLYS 79.10* 82.60*  --    LYMPHOCYTES 5.00* 7.80*  --    MONOCYTES 4.20 4.40  --    EOSINOPHILS 0.00 2.60  --    BASOPHILS 0.80 0.90  --    RBCMORPHOLO Present Present  --        Recent Labs     11/14/22  0156 11/15/22  0219   SODIUM 135 134*   POTASSIUM 2.8* 3.6   CHLORIDE 107 106   CO2 18* 20   GLUCOSE 103* 112*   BUN 22 22       Recent Labs     11/14/22  0156 11/15/22  0219   ALBUMIN 2.7*  --    TBILIRUBIN 1.1  --    ALKPHOSPHAT 131*  --    TOTPROTEIN 6.0  --    ALTSGPT 6  --    ASTSGOT 26  --    CREATININE 1.02 0.99         Imaging:  CT-ABDOMEN-PELVIS W/O    Result Date: 11/5/2022 11/5/2022 9:05 AM HISTORY/REASON FOR EXAM:  Abdominal distention. TECHNIQUE/EXAM DESCRIPTION: CT scan of the abdomen and pelvis without contrast. Noncontrast helical scanning was obtained from the diaphragmatic domes through the pubic symphysis. Low dose optimization technique was utilized for this CT exam including automated exposure control and adjustment of the mA and/or kV according to patient size. COMPARISON: None. FINDINGS: Lower Chest: There is bibasilar atelectasis.. Liver: Normal. Spleen: Calcified granulomas. Pancreas: Atrophic. Gallbladder: There are multiple gallstones. Biliary: Nondilated. Adrenal glands: Normal. Kidneys: Unremarkable without hydronephrosis. Bowel: The colon is mildly distended and contains fluid.. Lymph nodes: No adenopathy. Vasculature: Unremarkable. Peritoneum: Unremarkable without ascites. Musculoskeletal: There are chronic compression fractures of the spine.. Pelvis: No adenopathy or free fluid.     1.  No evidence of  bowel obstruction or focal inflammatory change. 2.  Gallstones. 3.  Mildly distended colon which also contains fluid. No evidence of bowel obstruction.    DX-CHEST-PORTABLE (1 VIEW)    Result Date: 11/7/2022 11/7/2022 12:04 AM HISTORY/REASON FOR EXAM:  sepsis. TECHNIQUE/EXAM DESCRIPTION AND NUMBER OF VIEWS: Single portable view of the chest. COMPARISON:  11/5/2022. FINDINGS: LUNGS: Linear atelectasis in the right lower lung. No focal consolidation. Persistent interstitial edema. No effusions. PNEUMOTHORAX: None. LINES AND TUBES: Stable right IJ central catheter. MEDIASTINUM: Stable cardiac silhouette. MUSCULOSKELETAL STRUCTURES: Unchanged.     1.  Stable cardiomegaly and interstitial edema. 2.  Stable linear atelectasis in the right lower lung.     DX-CHEST-PORTABLE (1 VIEW)    Result Date: 11/5/2022 11/5/2022 8:17 AM HISTORY/REASON FOR EXAM: Central line placement TECHNIQUE/EXAM DESCRIPTION AND NUMBER OF VIEWS: Single portable view of the chest. COMPARISON: 11/5/2022 FINDINGS: There is a right IJ central line present with the tip overlying the upper right atrium. No evidence of pneumothorax. There is interstitial edema. There is linear atelectasis within the right midlung. There is no pleural effusion. The heart is enlarged.     1.  Cardiomegaly. There is also interstitial edema. 2.  Right IJ central line present without evidence of complication. 3.  Linear atelectasis within the right lung base.    DX-CHEST-PORTABLE (1 VIEW)    Result Date: 11/5/2022 11/5/2022 3:40 AM HISTORY/REASON FOR EXAM:  Sepsis TECHNIQUE/EXAM DESCRIPTION AND NUMBER OF VIEWS: Single portable view of the chest. COMPARISON: 5/25/2021 FINDINGS: HEART: Not enlarged. LUNGS: Hazy opacity right lower lung. PLEURA: No effusion or pneumothorax.     1.  Hazy opacity at the right lower lung, similar to 5/25/2021 exam. Scarring versus pneumonia.    DX-ELBOW-COMPLETE 3+ LEFT    Result Date: 11/5/2022 11/5/2022 11:29 AM HISTORY/REASON FOR EXAM:   Pain/Deformity Following Trauma TECHNIQUE/EXAM DESCRIPTION AND NUMBER OF VIEWS:  3 views of the LEFT elbow. COMPARISON: None FINDINGS: There is no evidence of joint effusion. There is no evidence of displaced fracture or dislocation. There is no focal soft tissue swelling.     Normal elbow series.    US-EXTREMITY NON VASCULAR UNILATERAL LEFT    Result Date: 2022 12:21 PM HISTORY/REASON FOR EXAM:  Left elbow swelling, redness, pain TECHNIQUE/EXAM DESCRIPTION AND NUMBER OF VIEWS: Ultrasound extremity nonvascular unilateral COMPARISON: X-ray yesterday FINDINGS: There is edema. No fluid collection identified.     Soft tissue edema in the area of concern about the left elbow without evidence of fluid collection/abscess.    EC-ECHOCARDIOGRAM COMPLETE W/ CONT    Result Date: 2022  Transthoracic Echo Report Echocardiography Laboratory CONCLUSIONS Severely reduced left ventricular systolic function. The left ventricular ejection fraction is visually estimated to be 25- 30%, but may be underestimated due to tachycardia. Global hypokinesis with more pronounced hypokinesis of the anterior and anterolateral walls. Diastolic function is abnormal, but grade cannot be determined. The right ventricle is not well visualized, but appears dilated with reduced systolic function. Biatrial dilation. Mild to moderate tricuspid regurgitation. Dilated inferior vena cava without inspiratory collapse. EMELY FLORES Exam Date:         2022                    11:46 Exam Location:     Inpatient Priority:          Routine Ordering Physician:        LESLIE ARMENTA Referring Physician: Sonographer:               Sheron Baxter RDCS Age:    57     Gender:    M MRN:    [id number] :    1965 BSA:    2.49   Ht (in):    72     Wt (lb):    290 Exam Type:     Complete, Contrast Indications:     Heart Failure, Systolic ICD Codes:       428.2 CPT Codes:       46786,  BP:   90     /   52     HR:   120 Technical  Quality:       Fair MEASUREMENTS  (Male / Female) Normal Values 2D ECHO LV Diastolic Diameter PLAX        5.1 cm                4.2 - 5.9 / 3.9 - 5.3 cm LV Systolic Diameter PLAX         4.8 cm                2.1 - 4.0 cm IVS Diastolic Thickness           1 cm                  LVPW Diastolic Thickness          0.83 cm               LVOT Diameter                     2.2 cm                Estimated LV Ejection Fraction    30 %                  LV Ejection Fraction MOD BP       39.3 %                >= 55  % LV Ejection Fraction MOD 4C       41.2 %                LV Ejection Fraction MOD 2C       36.1 %                IVC Diameter                      2.5 cm                DOPPLER AV Peak Velocity                  1.2 m/s               AV Peak Gradient                  6 mmHg                AV Mean Gradient                  3.8 mmHg              LVOT Peak Velocity                0.85 m/s              AV Area Cont Eq vti               2.4 cm2               TV Peak E Velocity                0.51 m/s              TR Peak Velocity                  290 cm/s              * Indicates values subject to auto-interpretation LV EF:  30    % FINDINGS Left Ventricle Normal left ventricular chamber size. Normal left ventricular wall thickness. Severely reduced left ventricular systolic function. The left ventricular ejection fraction is visually estimated to be 25-30%, but may be underestimated due to tachycardia. Global hypokinesis with more pronounced hypokinesis of the anterior and anterolateral walls. Diastolic function is abnormal, but grade cannot be determined. 3 mL of contrast was used to enhance visualization of the endocardial border. Existing IV was used at the left elbow. Right Ventricle The right ventricle is not well visualized, but appears dilated with reduced systolic function. Estimated right ventricular systolic pressure is 50 mmHg Right Atrium The right atrium is not well visualized, but appears dilated. Dilated  "inferior vena cava without inspiratory collapse. Left Atrium The left atrium is not well visualized, but appears dilated. Mitral Valve The mitral valve is not well visualized. No stenosis or regurgitation seen. Aortic Valve The aortic valve is not well visualized. No stenosis or regurgitation seen. Tricuspid Valve No tricuspid stenosis. Mild to moderate tricuspid regurgitation. Pulmonic Valve Structurally normal pulmonic valve without significant stenosis or regurgitation. Pericardium Normal pericardium without effusion. Aorta Normal aortic root for body surface area. The ascending aorta diameter is 3.5 cm. Michael Acosta MD (Electronically Signed) Final Date:     05 November 2022                 14:02      Micro:  Results       Procedure Component Value Units Date/Time    C Diff by PCR rflx Toxin [[id number]] Collected: 11/15/22 0216    Order Status: Canceled Specimen: Stool     Cdiff By PCR Rflx Toxin [[id number]]     Order Status: No result     BLOOD CULTURE [[id number]] Collected: 11/07/22 1053    Order Status: Completed Specimen: Blood from Peripheral Updated: 11/12/22 1300     Significant Indicator NEG     Source BLD     Site PERIPHERAL     Culture Result No growth after 5 days of incubation.    Narrative:      Per Hospital Policy: Only change Specimen Src: to \"Line\" if  specified by physician order.  No site indicated    BLOOD CULTURE [[id number]] Collected: 11/07/22 1102    Order Status: Completed Specimen: Blood from Peripheral Updated: 11/12/22 1300     Significant Indicator NEG     Source BLD     Site PERIPHERAL     Culture Result No growth after 5 days of incubation.    Narrative:      Per Hospital Policy: Only change Specimen Src: to \"Line\" if  specified by physician order.  No site indicated    CULTURE STOOL [[id number]] Collected: 11/05/22 0450    Order Status: Completed Specimen: Stool Updated: 11/08/22 1518     Significant Indicator NEG     Source STL     Site STOOL     Culture Result No enteric " pathogens isolated.  NOTE:  Stool cultures are screened for Shiga Toxins 1 and 2,  Salmonella, Shigella, Campylobacter, Aeromonas,  Plesiomonas, and Vibrio.       [facility] Negative for Shiga Toxin 1 and 2.     Campylobactor Antigen --     Negative for Campylobacter Antigen.  Test results are to be used in conjunction with information  available from the patient clinical evaluation and other  diagnostic procedures.      Narrative:      For adult patients only; culture includes screen for  Campylobacter.  For adult patients only; culture includes screen for            Assessment:  This is a 57-year-old male patient with type 2 diabetes, nonischemic cardiomyopathy, A. fib on amiodarone and Eliquis, prior hospitalization for bowel perforation, presented with 4 days of rigors, watery diarrhea, fevers has a chronic superficial left heel wound, was found to be in septic shock with acute renal failure and lactic acidosis, blood cultures positive for GPC in chains.  CT scan of the abdomen pelvis with no inflammation.  Chest x-ray with interstitial opacities.  TTE with EF of 25 to 30%, no obvious valvular vegetations were noted.    Pertinent diagnoses:  Group A streptococcal bacteremia  Left elbow effusion, concerning for septic arthritis  Leukocytosis, overall improving  Type 2 diabetes mellitus  Atrial fibrillation on amiodarone and Eliquis  Nonischemic cardiomyopathy  Diarrhea    Plan:  -Continue IV cefazolin 2 g every 8 hours  -Blood cultures on 11/5 positive for Group A Strep  -Repeat blood cultures on 11/7-negative to date  -Multiple allergies including Unasyn, Zosyn, patient prefers not to attempt challenge.    -MRI without any evidence of osteomyelitis; subcutaneous edema along the posterior lateral elbow possibly related to cellulitis.  Small elbow joint effusion  -Repeat blood cultures x2 from 11/7 no growth to date  -Recommend left olecranon joint aspiration.  If insufficient fluid aspirated, will treat for septic  arthritis as patient's clinical exam is consistent with this  -C. difficile PCR-pending  -Ortho recommending IR guided aspiration  -Anticipate a 4 to course of IV antibiotics     I have performed a physical exam and reviewed and updated ROS and plan today 11/15/2022.  In review of yesterday's note 11/14/2022, there are no changes except as documented above.    Discussed with internal medicine, Dr. Montana    Addendum:  Unable to aspirate fluid in left elbow joint secondary to insufficient amount per IR  Midline ordered today  We will plan to treat for a 4-week course of IV antibiotics from 11/7 and continue to monitor patient's left elbow  Anticipated stop date 12/5/2022    Disposition: SNF    Need for midline: Yes    Follow-up in the ID clinic    Updated plan of care discussed with hospitalist, Dr. Montana

## 2022-11-15 NOTE — PROGRESS NOTES
Bedside report received and patient care assumed. Pt is resting in bed, A&O4, with no complaints of pain, and is on 1L. Tele box on. All fall precautions are in place, belongings at bedside table.  Pt was updated on POC, no questions or concerns. Pt educated on use of call light for assistance.

## 2022-11-15 NOTE — PROGRESS NOTES
Hospital Medicine Daily Progress Note    Date of Service  11/15/2022    Chief Complaint  Sebastián Castro is a 57 y.o. male admitted 11/5/2022 with septic shock.    Hospital Course:    57-year-old male with a past medical history of morbid obesity, permanent A. fib on amiodarone and Eliquis, non-insulin-dependent diabetes mellitus type 2, nonischemic cardiomyopathy and prior hospitalization for bowel perforation status post expiratory laparotomy presents to the hospital with complaint of rigors on 11/5/2022.  On presentation patient reported that he has been having symptoms of diarrhea and rigors for the last 4 days which is associated with fevers.  He has been living at the skilled nursing facility for the past 1 year.  He reported generalized weakness and lightheadedness with visual changes on admission.  On admission he found to have septic shock and received IV fluid bolus and started him on IV antibiotics and he was placed on pressor due to hypotension with a target map of greater than 65.  Infectious disease consulted for positive blood cultures 2/2 with strep A, echo did not show any signs of endocarditis, repeat blood culture was negative.  He was placed on cefepime and Flagyl.  There would be possible source of joint infection of left elbow and he underwent ultrasound did not show fluid collection. MRI elbow obtained showing no evidence of septic arthritis, however joint effusion present. Orthopedic surgery consulted for possible aspiration of joint.  Case was discussed with orthopedic and IR for possible arthrocentesis however there is no any free fluid for tapping.  Continue Ancef for 4 weeks per ID recommendation.    On admission patient had acute kidney injury with creatinine around 3, with IV fluid and antibiotics were resolved and creatinine 0.9.    Patient has history of atrial fibrillation and heart failure, echo during this admission showed global hypokinesis ejection fraction 30%, will continue home  medication including Eliquis and follow-up with cardiology clinic.    Interval Problem Update  -Evaluated examined the patient at bedside, patient is alert and oriented x4, denied any new symptoms.  -Labs reviewed improving on leukocytosis  -Consult discussed with orthopedic and IR for possible arthrocentesis, there is no adnexal fluid.  -Case was discussed with ID Dr. Anderson, continue antibiotics for at least 4 weeks.  -Planning to send the patient to SNF to continue IV antibiotics.      I have discussed this patient's plan of care and discharge plan at IDT rounds today with Case Management, Nursing, Nursing leadership, and other members of the IDT team.    Consultants/Specialty  critical care and infectious disease    Code Status  Full Code    Disposition  Patient is not medically cleared for discharge.   Patient might need couple days for medical clearance to continue antibiotics in SNF.  Anticipate discharge to SNF.  I have placed the appropriate orders for post-discharge needs.    Review of Systems  Review of Systems   Constitutional:  Negative for chills and fever.   Eyes:  Negative for blurred vision and pain.   Respiratory:  Negative for cough and shortness of breath.    Cardiovascular:  Negative for chest pain, palpitations and leg swelling.   Gastrointestinal:  Negative for abdominal pain, nausea and vomiting.   Genitourinary:  Negative for dysuria and urgency.   Musculoskeletal:  Positive for joint pain. Negative for back pain.   Skin:  Negative for itching and rash.   Neurological:  Negative for dizziness, sensory change, focal weakness and headaches.   Psychiatric/Behavioral:  Negative for substance abuse. The patient does not have insomnia.       Physical Exam  Temp:  [36.6 °C (97.9 °F)-37.6 °C (99.7 °F)] 37.6 °C (99.7 °F)  Pulse:  [69-81] 78  Resp:  [16-18] 16  BP: (122-137)/(67-77) 122/67  SpO2:  [91 %-95 %] 95 %    Physical Exam  Constitutional:       General: He is not in acute distress.      Appearance: He is not ill-appearing.   HENT:      Head: Normocephalic and atraumatic.      Right Ear: External ear normal.      Left Ear: External ear normal.      Mouth/Throat:      Pharynx: No oropharyngeal exudate or posterior oropharyngeal erythema.   Eyes:      Extraocular Movements: Extraocular movements intact.      Pupils: Pupils are equal, round, and reactive to light.   Cardiovascular:      Rate and Rhythm: Normal rate and regular rhythm.      Pulses: Normal pulses.      Heart sounds: Normal heart sounds.   Pulmonary:      Effort: Pulmonary effort is normal. No respiratory distress.      Breath sounds: Normal breath sounds. No wheezing or rales.   Abdominal:      General: Bowel sounds are normal. There is no distension.      Palpations: Abdomen is soft.      Tenderness: There is no abdominal tenderness. There is no guarding or rebound.   Musculoskeletal:         General: Swelling present. No tenderness.      Cervical back: Normal range of motion and neck supple.      Right lower leg: No edema.      Left lower leg: No edema.      Comments: Left elbow with some inflammation, no open wounds or drainage   Skin:     General: Skin is warm and dry.   Neurological:      General: No focal deficit present.      Mental Status: He is oriented to person, place, and time.      Cranial Nerves: No cranial nerve deficit.      Sensory: No sensory deficit.      Motor: No weakness.   Psychiatric:         Mood and Affect: Mood normal.         Behavior: Behavior normal.       Fluids    Intake/Output Summary (Last 24 hours) at 11/15/2022 1402  Last data filed at 11/15/2022 1249  Gross per 24 hour   Intake 490 ml   Output 1000 ml   Net -510 ml       Laboratory  Recent Labs     11/13/22  0700 11/14/22  0156 11/15/22  0219   WBC 15.1* 12.7* 11.8*   RBC 3.79* 3.48* 3.66*   HEMOGLOBIN 11.4* 10.6* 10.8*   HEMATOCRIT 34.5* 31.9* 33.6*   MCV 91.0 91.7 91.8   MCH 30.1 30.5 29.5   MCHC 33.0* 33.2* 32.1*   RDW 52.6* 52.6* 53.4*    PLATELETCT 266 238 269   MPV 10.5 10.4 10.2     Recent Labs     11/14/22  0156 11/15/22  0219   SODIUM 135 134*   POTASSIUM 2.8* 3.6   CHLORIDE 107 106   CO2 18* 20   GLUCOSE 103* 112*   BUN 22 22   CREATININE 1.02 0.99   CALCIUM 8.2* 8.1*                   Imaging  MR-ELBOW-WITH & W/O LEFT   Final Result         1. No MR evidence of osteomyelitis.   2. Subcutaneous edema along the posterior lateral elbow could relate to cellulitis. Mild diffuse increased signal throughout the underlying elbow muscles could relate to atrophy or myositis.   3. Small elbow joint effusion, nonspecific, and favored to represent reactive changes rather than septic arthritis.      CT-ELBOW WITH LEFT   Final Result      1.  No bony abnormality indicative of osteomyelitis.   2.  Diffuse soft tissue swelling and induration about the elbow and proximal forearm most consistent with superficial and deep cellulitis.   3.  Note, CT is suboptimal for soft tissue detail and MRI of the elbow could be pursued if clinically indicated for management and would also be preferable for detection of early osteomyelitis.      DX-CHEST-PORTABLE (1 VIEW)   Final Result      1.  Stable cardiomegaly and interstitial edema.   2.  Stable linear atelectasis in the right lower lung.         US-EXTREMITY NON VASCULAR UNILATERAL LEFT   Final Result      Soft tissue edema in the area of concern about the left elbow without evidence of fluid collection/abscess.      EC-ECHOCARDIOGRAM COMPLETE W/ CONT   Final Result      DX-ELBOW-COMPLETE 3+ LEFT   Final Result      Normal elbow series.      CT-ABDOMEN-PELVIS W/O   Final Result      1.  No evidence of bowel obstruction or focal inflammatory change.      2.  Gallstones.      3.  Mildly distended colon which also contains fluid. No evidence of bowel obstruction.      DX-CHEST-PORTABLE (1 VIEW)   Final Result      1.  Cardiomegaly. There is also interstitial edema.      2.  Right IJ central line present without evidence  of complication.      3.  Linear atelectasis within the right lung base.      DX-CHEST-PORTABLE (1 VIEW)   Final Result      1.  Hazy opacity at the right lower lung, similar to 5/25/2021 exam. Scarring versus pneumonia.      IR-CONSULT AND TREAT    (Results Pending)        Assessment/Plan  * Severe sepsis with septic shock (HCC)- (present on admission)  Assessment & Plan  Continue intravenous antimicrobial therapy  Shock has resolved - he is off vasopressor (levophed).  Sepsis is improving  Taper hydrocortisone, 50 mg IV daily    Swelling of left elbow joint  Assessment & Plan  Edema and Erythema of Left Elbow Joint - Native Joint  Significant Tenderness to palpation.   CT elbow without signs of abscess or septic arthritis, shows cellulitis; continue antibiotics  MRI elbow shows no septic arthritis, does show joint effusion  Orthopedic surgery and IR where consulted: No fluid for tapping   Likely cellulitis and continue Ancef      Acute kidney injury (HCC)  Assessment & Plan  Resolved   Likely related to sepsis and dehydration   initially Creatinine of 3.13 and GFR of 22  -Avoid Nephrotoxins and Renally Dose Meds   Resolved with sepsis improvement      Acute on chronic heart failure (HCC)  Assessment & Plan  Hx of Nonischemic Cardiomyopathy with ejection fraction 30% and improved to 60%  Echo from 2021 with EF of 55% and repeat echo showed 30% with global hypokinesis  Metoprolol, lisinopril, lasix, spironolactone  Follow up with cardiology as outpatient, patient might need ischemic cardiomyopathy work-up however since patient's tach and bacteremia,  Holding any work-up.  Control his atrial fibrillation    Streptococcal bacteremia  Assessment & Plan  Blood cultures positive for streptococcus, source unclear  Recent TTE negative for endocarditis  ID consulted and following          Continue IV Ancef 2g q 8 hrs   I discussed plan of care with him.    Thrombocytopenia (HCC)- (present on admission)  Assessment &  Plan  Stable current platelet count is 137.  No signs of active bleeding.  Likely related to infection and sepsis    Gastroenteritis  Assessment & Plan  Started 4 days prior to Admission  CT Abdomen unremarkable.  Improving slightly.  Will continue to monitor  Antidiarrheals prn      Bacteremia due to Gram-positive bacteria  Assessment & Plan  Group A streptococcus Bacteremia, likely from cellulitis  Was in Septic Shock with acute respiratory failure and kidney failure  Initially given Fluid administration per sepsis protocol.   Started on Pressor Levophed on 11/5 -> D/Cd on 11/8.    Started on Hydrocortisone 11/6 - Stress dose, hx of adrenal insuff.  Abdominal CT and Chest Xray unremarkable.  TTE without evidence of endocarditis  UA mild UTI, culture negative to date.  Suspected left elbow as infection source.    ID consulted, pt started on cefepime and flagyl empirically.     On 11/6 daptomycin was added for Enterococcus coverage.    On 11/7 cefepime, flagyl, & daptomycin D/Cd. Started IV Ancef 2 g every 8 hours. Rpt Blood Cx obtained.   Not able to get fluid from his elbow joint  Likely cell repeat blood cultures negative  Continue Ancef per ID recommendations for at least for 4 weeks    Permanent atrial fibrillation (HCC)- (present on admission)  Assessment & Plan  Currently in atrial fibrillation with RVR.   Rate controlled  Continue amiodarone and metoprolol  Holding home eliquis pending plan for operative interventions, resume when appropriate    Hypothyroidism- (present on admission)  Assessment & Plan  Continue Synthroid    Nonischemic cardiomyopathy (HCC)  Assessment & Plan  Unclear etiology of CM  Left heart cath in 2018 was negative for CAD  EF 30% here, worse than prior  Continue home metoprolol, lisinopril; start aldactone  Follow up with cardiology as outpatient for further management    Obesity due to excess calories with serious comorbidity- (present on admission)  Assessment & Plan  Will need  counseling when clinically appropriate    Hypokalemia- (present on admission)  Assessment & Plan  From diarrhea  Replace         VTE prophylaxis: SCDs    I have performed a physical exam and reviewed and updated ROS and Plan today (11/15/2022). In review of yesterday's note (11/14/2022), there are no changes except as documented above.

## 2022-11-16 LAB
ANION GAP SERPL CALC-SCNC: 10 MMOL/L (ref 7–16)
BASOPHILS # BLD AUTO: 0.5 % (ref 0–1.8)
BASOPHILS # BLD: 0.05 K/UL (ref 0–0.12)
BUN SERPL-MCNC: 20 MG/DL (ref 8–22)
CALCIUM SERPL-MCNC: 8.2 MG/DL (ref 8.5–10.5)
CHLORIDE SERPL-SCNC: 104 MMOL/L (ref 96–112)
CO2 SERPL-SCNC: 22 MMOL/L (ref 20–33)
CREAT SERPL-MCNC: 1.03 MG/DL (ref 0.5–1.4)
EOSINOPHIL # BLD AUTO: 0.14 K/UL (ref 0–0.51)
EOSINOPHIL NFR BLD: 1.3 % (ref 0–6.9)
ERYTHROCYTE [DISTWIDTH] IN BLOOD BY AUTOMATED COUNT: 52.6 FL (ref 35.9–50)
GFR SERPLBLD CREATININE-BSD FMLA CKD-EPI: 83 ML/MIN/1.73 M 2
GLUCOSE BLD STRIP.AUTO-MCNC: 122 MG/DL (ref 65–99)
GLUCOSE BLD STRIP.AUTO-MCNC: 92 MG/DL (ref 65–99)
GLUCOSE BLD STRIP.AUTO-MCNC: 92 MG/DL (ref 65–99)
GLUCOSE BLD STRIP.AUTO-MCNC: 98 MG/DL (ref 65–99)
GLUCOSE SERPL-MCNC: 98 MG/DL (ref 65–99)
HCT VFR BLD AUTO: 32.3 % (ref 42–52)
HGB BLD-MCNC: 10.6 G/DL (ref 14–18)
IMM GRANULOCYTES # BLD AUTO: 0.24 K/UL (ref 0–0.11)
IMM GRANULOCYTES NFR BLD AUTO: 2.3 % (ref 0–0.9)
LYMPHOCYTES # BLD AUTO: 1.54 K/UL (ref 1–4.8)
LYMPHOCYTES NFR BLD: 14.6 % (ref 22–41)
MAGNESIUM SERPL-MCNC: 1.5 MG/DL (ref 1.5–2.5)
MCH RBC QN AUTO: 29.9 PG (ref 27–33)
MCHC RBC AUTO-ENTMCNC: 32.8 G/DL (ref 33.7–35.3)
MCV RBC AUTO: 91.2 FL (ref 81.4–97.8)
MONOCYTES # BLD AUTO: 0.81 K/UL (ref 0–0.85)
MONOCYTES NFR BLD AUTO: 7.7 % (ref 0–13.4)
NEUTROPHILS # BLD AUTO: 7.74 K/UL (ref 1.82–7.42)
NEUTROPHILS NFR BLD: 73.6 % (ref 44–72)
NRBC # BLD AUTO: 0 K/UL
NRBC BLD-RTO: 0 /100 WBC
PLATELET # BLD AUTO: 266 K/UL (ref 164–446)
PMV BLD AUTO: 10.7 FL (ref 9–12.9)
POTASSIUM SERPL-SCNC: 2.9 MMOL/L (ref 3.6–5.5)
PROCALCITONIN SERPL-MCNC: 0.33 NG/ML
RBC # BLD AUTO: 3.54 M/UL (ref 4.7–6.1)
SODIUM SERPL-SCNC: 136 MMOL/L (ref 135–145)
WBC # BLD AUTO: 10.5 K/UL (ref 4.8–10.8)

## 2022-11-16 PROCEDURE — 85025 COMPLETE CBC W/AUTO DIFF WBC: CPT

## 2022-11-16 PROCEDURE — 99233 SBSQ HOSP IP/OBS HIGH 50: CPT | Performed by: INTERNAL MEDICINE

## 2022-11-16 PROCEDURE — A9270 NON-COVERED ITEM OR SERVICE: HCPCS | Performed by: STUDENT IN AN ORGANIZED HEALTH CARE EDUCATION/TRAINING PROGRAM

## 2022-11-16 PROCEDURE — 84145 PROCALCITONIN (PCT): CPT

## 2022-11-16 PROCEDURE — 700105 HCHG RX REV CODE 258: Performed by: INTERNAL MEDICINE

## 2022-11-16 PROCEDURE — 700111 HCHG RX REV CODE 636 W/ 250 OVERRIDE (IP): Performed by: INTERNAL MEDICINE

## 2022-11-16 PROCEDURE — 36415 COLL VENOUS BLD VENIPUNCTURE: CPT

## 2022-11-16 PROCEDURE — 700102 HCHG RX REV CODE 250 W/ 637 OVERRIDE(OP): Performed by: STUDENT IN AN ORGANIZED HEALTH CARE EDUCATION/TRAINING PROGRAM

## 2022-11-16 PROCEDURE — 80048 BASIC METABOLIC PNL TOTAL CA: CPT

## 2022-11-16 PROCEDURE — 97530 THERAPEUTIC ACTIVITIES: CPT

## 2022-11-16 PROCEDURE — 700102 HCHG RX REV CODE 250 W/ 637 OVERRIDE(OP): Performed by: INTERNAL MEDICINE

## 2022-11-16 PROCEDURE — 82962 GLUCOSE BLOOD TEST: CPT

## 2022-11-16 PROCEDURE — 770001 HCHG ROOM/CARE - MED/SURG/GYN PRIV*

## 2022-11-16 PROCEDURE — 99232 SBSQ HOSP IP/OBS MODERATE 35: CPT | Performed by: INTERNAL MEDICINE

## 2022-11-16 PROCEDURE — A9270 NON-COVERED ITEM OR SERVICE: HCPCS | Performed by: INTERNAL MEDICINE

## 2022-11-16 PROCEDURE — 83735 ASSAY OF MAGNESIUM: CPT

## 2022-11-16 RX ORDER — MAGNESIUM SULFATE HEPTAHYDRATE 40 MG/ML
2 INJECTION, SOLUTION INTRAVENOUS ONCE
Status: COMPLETED | OUTPATIENT
Start: 2022-11-16 | End: 2022-11-16

## 2022-11-16 RX ORDER — POTASSIUM CHLORIDE 20 MEQ/1
40 TABLET, EXTENDED RELEASE ORAL 3 TIMES DAILY
Status: COMPLETED | OUTPATIENT
Start: 2022-11-16 | End: 2022-11-16

## 2022-11-16 RX ADMIN — POTASSIUM CHLORIDE 40 MEQ: 1500 TABLET, EXTENDED RELEASE ORAL at 17:37

## 2022-11-16 RX ADMIN — SPIRONOLACTONE 25 MG: 25 TABLET ORAL at 06:27

## 2022-11-16 RX ADMIN — POTASSIUM CHLORIDE 40 MEQ: 1500 TABLET, EXTENDED RELEASE ORAL at 13:18

## 2022-11-16 RX ADMIN — POTASSIUM CHLORIDE 40 MEQ: 1500 TABLET, EXTENDED RELEASE ORAL at 08:07

## 2022-11-16 RX ADMIN — MAGNESIUM SULFATE HEPTAHYDRATE 2 G: 40 INJECTION, SOLUTION INTRAVENOUS at 08:11

## 2022-11-16 RX ADMIN — AMIODARONE HYDROCHLORIDE 400 MG: 200 TABLET ORAL at 06:27

## 2022-11-16 RX ADMIN — APIXABAN 5 MG: 5 TABLET, FILM COATED ORAL at 17:37

## 2022-11-16 RX ADMIN — METOPROLOL SUCCINATE 50 MG: 50 TABLET, EXTENDED RELEASE ORAL at 06:27

## 2022-11-16 RX ADMIN — LEVOTHYROXINE SODIUM 150 MCG: 0.07 TABLET ORAL at 06:27

## 2022-11-16 RX ADMIN — ACETAMINOPHEN 650 MG: 325 TABLET, FILM COATED ORAL at 08:07

## 2022-11-16 RX ADMIN — CEFAZOLIN 2 G: 2 INJECTION, POWDER, FOR SOLUTION INTRAMUSCULAR; INTRAVENOUS at 21:38

## 2022-11-16 RX ADMIN — MICONAZOLE NITRATE: 20 CREAM TOPICAL at 17:41

## 2022-11-16 RX ADMIN — TAMSULOSIN HYDROCHLORIDE 0.4 MG: 0.4 CAPSULE ORAL at 08:06

## 2022-11-16 RX ADMIN — LISINOPRIL 20 MG: 20 TABLET ORAL at 06:27

## 2022-11-16 RX ADMIN — CEFAZOLIN 2 G: 2 INJECTION, POWDER, FOR SOLUTION INTRAMUSCULAR; INTRAVENOUS at 13:21

## 2022-11-16 RX ADMIN — CEFAZOLIN 2 G: 2 INJECTION, POWDER, FOR SOLUTION INTRAMUSCULAR; INTRAVENOUS at 06:30

## 2022-11-16 RX ADMIN — APIXABAN 5 MG: 5 TABLET, FILM COATED ORAL at 06:27

## 2022-11-16 RX ADMIN — TRAZODONE HYDROCHLORIDE 50 MG: 50 TABLET ORAL at 21:39

## 2022-11-16 ASSESSMENT — COGNITIVE AND FUNCTIONAL STATUS - GENERAL
MOBILITY SCORE: 11
CLIMB 3 TO 5 STEPS WITH RAILING: TOTAL
TURNING FROM BACK TO SIDE WHILE IN FLAT BAD: A LOT
MOVING FROM LYING ON BACK TO SITTING ON SIDE OF FLAT BED: A LOT
WALKING IN HOSPITAL ROOM: A LOT
STANDING UP FROM CHAIR USING ARMS: A LITTLE
MOVING TO AND FROM BED TO CHAIR: UNABLE
SUGGESTED CMS G CODE MODIFIER MOBILITY: CL

## 2022-11-16 ASSESSMENT — FIBROSIS 4 INDEX: FIB4 SCORE: 2.35

## 2022-11-16 ASSESSMENT — ENCOUNTER SYMPTOMS
BLURRED VISION: 0
SENSORY CHANGE: 0
ABDOMINAL PAIN: 0
COUGH: 0
INSOMNIA: 0
NAUSEA: 0
FEVER: 0
DIARRHEA: 0
SHORTNESS OF BREATH: 0
FOCAL WEAKNESS: 0
BACK PAIN: 0
HEADACHES: 0
EYE PAIN: 0
CHILLS: 0
DIZZINESS: 0
VOMITING: 0
PALPITATIONS: 0

## 2022-11-16 ASSESSMENT — PAIN DESCRIPTION - PAIN TYPE: TYPE: ACUTE PAIN

## 2022-11-16 ASSESSMENT — GAIT ASSESSMENTS
GAIT LEVEL OF ASSIST: STANDBY ASSIST
DISTANCE (FEET): 4
DEVIATION: BRADYKINETIC;SHUFFLED GAIT;DECREASED HEEL STRIKE;DECREASED TOE OFF
ASSISTIVE DEVICE: FRONT WHEEL WALKER

## 2022-11-16 ASSESSMENT — LIFESTYLE VARIABLES: SUBSTANCE_ABUSE: 0

## 2022-11-16 NOTE — DIETARY
Nutrition Services Brief Update:  Day 11 of admit.  Sebastián Castro is a 57 y.o. male with admitting DX of Septic shock (HCC) [A41.9, R65.21]    Current Diet: Level 6 soft/bite-sized; Level 0 - thin liquids w/ Boost Glucose Control.     Intake documentation sporadic. % of 1 meal and boost 11/15. Nurse reports via voalte pt ate 50-75% of breakfast and 0% of boost. Encourage intake and document % in ADLS.     Problem: Nutritional:  Goal: Achieve adequate nutritional intake  Description: Patient will consume >50% of meals  Outcome: Slowly Progressing.     RD Following.

## 2022-11-16 NOTE — PROGRESS NOTES
Assumed care of pt, bedside report received from Tamy PÉREZ. Pt A&Ox 4, no complaints of pain at this time. Updated on POC, call light in reach, and fall precautions in place. Bed locked and in lowest position. Pt instructed to call for assistance before getting out of bed. All questions answered, no other needs at this time.

## 2022-11-16 NOTE — THERAPY
Physical Therapy   Daily Treatment     Patient Name: Sebastián Castro  Age:  57 y.o., Sex:  male  Medical Record #: [id number]  Today's Date: 11/16/2022     Precautions  Precautions: Fall Risk    Assessment    Pt seen for follow up PT tx. Pt cooperative but session significantly limited by large amounts of loose BM throughout mobility. Pt continues to require min assist with bed mobility and STS. Attempted to progress gait with FWW but pt incontinent of stool with gait. Encouraged pt to sit EOB for meals and ask nurse to use BSC when needed. PT will cont while pt is in acute care setting.     Plan    Continue current treatment plan.    DC Equipment Recommendations: Unable to determine at this time  Discharge Recommendations: Recommend post-acute placement for additional physical therapy services prior to discharge home         11/16/22 1500   Cognition    Level of Consciousness Alert   Comments cooperative but self limiting   Other Treatments   Other Treatments Provided significant loose BM throughout session limiting ambulation distance. Full linen change   Balance   Sitting Balance (Static) Fair +   Sitting Balance (Dynamic) Fair   Standing Balance (Static) Fair -   Standing Balance (Dynamic) Fair -   Weight Shift Sitting Good   Weight Shift Standing Fair   Skilled Intervention Verbal Cuing;Compensatory Strategies   Comments FWW   Gait Analysis   Gait Level Of Assist Standby Assist   Assistive Device Front Wheel Walker   Distance (Feet) 4   # of Times Distance was Traveled 1   Deviation Bradykinetic;Shuffled Gait;Decreased Heel Strike;Decreased Toe Off   Skilled Intervention Verbal Cuing;Compensatory Strategies   Comments FWW, limited by loose BM with gait   Bed Mobility    Supine to Sit Minimal Assist   Sit to Supine Minimal Assist   Scooting Minimal Assist   Rolling Supervised   Skilled Intervention Verbal Cuing;Compensatory Strategies   Comments bed rails and HOB raised   Functional Mobility   Sit to Stand Minimal  Assist   Transfer Method Stand Step   Mobility in room with FWW   Skilled Intervention Verbal Cuing;Compensatory Strategies   Short Term Goals    Short Term Goal # 1 Pt will perform bed mobility with supervision to progress function in 6 visits.   Goal Outcome # 1 goal not met   Short Term Goal # 2 Pt will perform functional transfers with FWW and supervision to progress mobility in 6 visits.   Goal Outcome # 2 Goal not met   Short Term Goal # 3 Pt will ambulate 50ft with FWW and min A to progress mobility in 6 visits.   Goal Outcome # 3 Goal not met   Anticipated Discharge Equipment and Recommendations   DC Equipment Recommendations Unable to determine at this time   Discharge Recommendations Recommend post-acute placement for additional physical therapy services prior to discharge home

## 2022-11-16 NOTE — WOUND TEAM
[facility]  Inpatient Services  Wound and Skin Care Evaluation    Admission Date: 11/5/2022     Last order of IP CONSULT TO WOUND CARE was found on 11/5/2022 from Hospital Encounter on 11/5/2022     HPI, PMH, SH: Reviewed    Past Surgical History:   Procedure Laterality Date    KY EXPLORATORY OF ABDOMEN  5/27/2021    Procedure: LAPAROTOMY, EXPLORATORY;  Surgeon: Kin Desouza D.O.;  Location: [facility];  Service: General    COLOSTOMY TAKEDOWN  5/27/2021    Procedure: COLOSTOMY TAKEDOWN AND CLOSURE;  Surgeon: Kin Desouza D.O.;  Location: [facility];  Service: General    COLECTOMY N/A 11/10/2020    Procedure: COLECTOMY-SEGMENTAL, COLOSTOMY, MOBILIZATION OF SPLENIC FLEXURE, WOUND VAC PLACEMENT, IRRIGATION AND DEBRIDMENT FLANK WOUND;  Surgeon: Kin Desouza D.O.;  Location: [facility];  Service: General    ZZZ CARDIAC CATH  07/21/2018    EF 45%, normal coronaries.    IRRIGATION & DEBRIDEMENT ORTHO Right 2/19/2018    Procedure: IRRIGATION & DEBRIDEMENT ORTHO-FOOT;  Surgeon: Kirby Lopez M.D.;  Location: [facility];  Service: Orthopedics    TOE AMPUTATION Right 1/17/2018    Procedure: TOE AMPUTATION-1ST RAY;  Surgeon: Doug Delong M.D.;  Location: [facility];  Service: Orthopedics    TOE AMPUTATION Right 11/10/2017    Procedure: TOE AMPUTATION;  Surgeon: Osorio Leo M.D.;  Location: [facility];  Service: Orthopedics     Social History     Tobacco Use    Smoking status: Never    Smokeless tobacco: Never   Substance Use Topics    Alcohol use: No     Chief Complaint   Patient presents with    Weakness     The pt is coming from [facility]. Staff and pt report weakness with tremors x2 days. No fever, bp 88/60. 500ml LR was given. The pt afib on the monitor at 115. Breathsounds bilaterally equal. Pt reports one episode of diarrhea. Hx of a-fib, CHF, Diabetes. 22g left forearm     Diagnosis: Septic shock (HCC) [A41.9,  R65.21]    Unit where seen by Wound Team: T611/00     WOUND CONSULT/FOLLOW UP RELATED TO:  Worsening sacrum    WOUND HISTORY:  Pt is a 57yr old male admitted with sepsis from SNF. Pt is well known to inpatient wound team related to previous abdominal vac and ostomy which was since reversed. Pt has been living at [facility] and states he fell a week ago causing pain to the left elbow, elbow is edematous and erythemic and painful to touch. Pt was also noted to have pressure injuries to the sacrum and left heel.     WOUND ASSESSMENT/LDA  Wound 11/05/22 Pressure Injury Coccyx sDTI POA -> S2 as of 11/16 (Active)   Wound Image   11/16/22 1200   Site Assessment Granulation tissue    Periwound Assessment Maceration;Fragile    Margins Defined edges;Attached edges    Closure Open to air    Drainage Amount Scant    Drainage Description Serosanguineous    Treatments Site care;Offloading    Wound Cleansing Foam Cleanser/Washcloth    Periwound Protectant Barrier Paste    Dressing Cleansing/Solutions Not Applicable    Dressing Options Open to Air    Dressing Changed Changed    Dressing Status Open to Air    Dressing Change/Treatment Frequency As Needed    NEXT Weekly Photo (Inpatient Only) 11/23/22    WOUND NURSE ONLY - Pressure Injury Stage 2    Wound Length (cm) 2.5 cm    Wound Width (cm) 3.7 cm    Wound Surface Area (cm^2) 9.25 cm^2    Shape Round    Exposed Structures None    WOUND NURSE ONLY - Time Spent with Patient (mins) 20          Vascular:    SHAYAN:   No results found.    Lab Values:    Lab Results   Component Value Date/Time    WBC 10.5 11/16/2022 03:50 AM    RBC 3.54 (L) 11/16/2022 03:50 AM    HEMOGLOBIN 10.6 (L) 11/16/2022 03:50 AM    HEMATOCRIT 32.3 (L) 11/16/2022 03:50 AM    CREACTPROT 34.89 (H) 11/06/2022 05:02 AM    SEDRATEWES 14 11/06/2022 05:02 AM    HBA1C 6.0 (H) 11/08/2022 05:20 AM      Culture Results show:  No results found for this or any previous visit (from the past 720 hour(s)).    Pain Level/Medicated:  Denies  pain       INTERVENTIONS BY WOUND TEAM:  Chart and images reviewed. Discussed with bedside RN. All areas of concern (based on picture review, LDA review and discussion with bedside RN) have been thoroughly assessed. Documentation of areas based on significant findings. This RN in to assess patient. Performed standard wound care which includes appropriate positioning, dressing removal and non-selective debridement. Pictures and measurements obtained weekly if/when required.  Preparation for Dressing removal: NA   Non-selectively Debrided with:  Moist warm washcloth and foam cleanser  Sharp debridement: NA  Joya wound: Cleansed with Moist warm washcloth, Prepped with barrier paste  Primary Dressing: barrier paste  Secondary (Outer) Dressing: offloading with pillows    Interdisciplinary consultation: Patient, Bedside RN  EVALUATION / RATIONALE FOR TREATMENT:  Most Recent Date:  11/16/22: POA DTI sacral pressure injury now open to a stage two pressure injury with surrounding MASD and yeast-like appearance. Wound progressing as expected. Miconazole cream ordered, continue application and offloading measures.    11/6/22: Pt with stage 3 POA Pressure injury to left heel. Hydrofera Blue applied for the hydrophilic polyurethane foam which contains ethylene oxide used as a bactericidal, fungicidal, and sporicidal disinfectant. Hydrofera Blue also aids in maintaining a moist wound environment. The absorption properties of this dressing are important in collecting exudates and bacteria from the injured area. These harmful fluid secretions bind to the dressing removing it from the wound without the foam sticking to the wound causing more harm. Sacrum with POA sDTI. Barrier paste and offloading measures implemented. Zabala and BMS in use to prevent further moisture related breakdown.  Pt has scattered purple linear areas of unknown etiology to bilateral medial thighs and groin area. Do not appear to be pressure related as pt  reports he did not have a frye in place PTA and no other pressure noted to be overlying the area. Pt states they are not painful.    Pt seen for placement of BMS. MD order verified. Pt assessed, noted to be incontinent of loose brown stool. Sacrum/buttocks pink and intact. Sphincter tone determined to be adequate. BMS placed without difficulty, 45 mL of tap water placed in retention balloon, irrigated with 60 mL warm tap water. Confirmed irrigant/stool output in tube. Nursing to irrigate q shift with 60 mL-120 mL warm tap water or until patent.     Goals: Steady decrease in wound area and depth weekly.    WOUND TEAM PLAN OF CARE ([X] for frequency of wound follow up,):   Nursing to follow dressing orders written for wound care. Contact wound team if area fails to progress, deteriorates or with any questions/concerns if something comes up before next scheduled follow up (See below as to whether wound is following and frequency of wound follow up)  Dressing changes by wound team:                   Follow up 3 times weekly:                NPWT change 3 times weekly:     Follow up 1-2 times weekly:      Follow up Bi-Monthly:           Follow up Monthly (High Risk):                        Follow up as needed:   X Not actively following  Other (explain):     NURSING PLAN OF CARE ORDERS (X):  Dressing changes: See Dressing Care orders: X  Skin care: See Skin Care orders:   RN Prevention Protocol:   Rectal tube care: See Rectal Tube Care orders:   Other orders:    RSKIN:   CURRENTLY IN PLACE (X), APPLIED THIS VISIT (A), ORDERED (O):   Q shift Michael:  X  Q shift pressure point assessments:  X    Surface/Positioning   Pressure redistribution mattress      X      Low Airloss          ICU Low Airloss   Bariatric DARYL     Waffle cushion        Waffle Overlay       X   Reposition q 2 hours   X   TAPs Turning system     Z Teja Pillow     Offloading/Redistribution   Sacral Mepilex (Silicone dressing)     Heel Mepilex (Silicone  dressing)     X    Heel float boots (Prevalon boot)        X     Float Heels off Bed with Pillows           Respiratory   Silicone O2 tubing       X  Gray Foam Ear protectors   X  Cannula fixation Device (Tender )          High flow offloading Clip    Elastic head band offloading device      Anchorfast                                                         Trach with Optifoam split foam             Containment/Moisture Prevention     Rectal tube or BMS    Purwick/Condom Cath        Zabala Catheter    Barrier wipes      O     Barrier paste     A  Antifungal tx    O  Interdry    X    Mobilization Not assessed this visit      Up to chair        Ambulate      PT/OT      Nutrition       Dietician        Diabetes Education      PO   X  TF     TPN     NPO   # days     Other        Anticipated discharge plans:   LTACH:        SNF/Rehab:   X               Home Health Care:           Outpatient Wound Center:            Self/Family Care:        Other:                  Vac Discharge Needs:   Not Applicable Pt not on a wound vac:     X  Regular Vac while inpatient, alternative dressing at DC:        Regular Vac in use and continued at DC:            Reg. Vac w/ Skin Sub/Biologic in use. Will need to be changed 2x wkly:      Veraflo Vac while inpatient, ok to transition to Regular Vac on Discharge:           Veraflo Vac while inpatient, will need to remain on Veraflo Vac upon discharge:

## 2022-11-16 NOTE — PROGRESS NOTES
Superficial infectious Disease Progress Note    Author: Jane Anderson M.D. Date & Time of service: 11/16/2022  8:23 AM    Chief Complaint:  Follow-up for group A strep bacteremia    Interval History:  11/7 T-max 102.6 on 11/5, white count up to 15.1 today, on cefepime and Flagyl.  Continues to have left elbow pain  11/8 patient afebrile since antibiotic initiation, white count of 11,000, tolerated switch to Ancef.  11/9 patient remains afebrile, white count is 13.4, tolerating Ancef.  MRI remains pending.  Elbow pain much better  11/10 patient remains afebrile, count is still elevated, 14.6 today, MRI still pending  11/11 patient remains afebrile, CBC this morning, MRI is still pending.  11/12 patient remains afebrile, count is up to 16.5 today, CT scan was done which was superficial and deep cellulitis.  Continues to have elbow pain  11/13 patient remains afebrile, white count remains persistently elevated at 15.1.  Orthopedics requesting MRI  11/14 afebrile, WBC 12.7 MRI with no evidence of osteomyelitis however there was a small elbow joint infusion nonspecific representing reactive changes rather than septic arthritis.   Leukocytosis decreased today.  Patient continues to have significant pain and restricted mobility in his left elbow.  Patient states he had 3 loose bowel movements overnight.  Diarrhea started 4 days ago  11/15 T-max 99.7, WBC 11.8 patient continues to have significant left elbow pain, swelling and restricted range of motion.  Ongoing diarrhea  11/16 afebrile, WBC 10.5 patient states diarrhea has improved.  His left elbow feels the same.  Plan of course regarding 4-week course of antibiotics discussed with patient in detail today    Labs Reviewed and Medications Reviewed.    Review of Systems:  Review of Systems   Constitutional:  Negative for chills and fever.   Respiratory:  Negative for cough and shortness of breath.    Gastrointestinal:  Negative for abdominal pain, diarrhea, nausea and  vomiting.   Musculoskeletal:  Positive for joint pain.        Left elbow   Skin:  Negative for itching and rash.   All other systems reviewed and are negative.    Hemodynamics:  Temp (24hrs), Av.1 °C (98.7 °F), Min:36.8 °C (98.2 °F), Max:37.6 °C (99.7 °F)  Temperature: 36.8 °C (98.2 °F)  Pulse  Av.8  Min: 65  Max: 140   Blood Pressure: 109/62       Physical Exam:  Physical Exam  Vitals and nursing note reviewed.   Constitutional:       General: He is not in acute distress.     Appearance: He is ill-appearing.      Comments: Chronically ill-appearing   HENT:      Mouth/Throat:      Comments: Poor dentition  Eyes:      General: No scleral icterus.        Right eye: No discharge.         Left eye: No discharge.      Conjunctiva/sclera: Conjunctivae normal.   Cardiovascular:      Rate and Rhythm: Normal rate and regular rhythm.      Heart sounds: No murmur heard.  Pulmonary:      Effort: Pulmonary effort is normal. No respiratory distress.      Breath sounds: No stridor.   Abdominal:      General: Abdomen is flat. There is no distension.      Tenderness: There is no abdominal tenderness.   Musculoskeletal:         General: Swelling and tenderness present.      Comments: Left posterior elbow swelling, pain with passive motion.  Erythema improved    Right upper extremity midline in place   Skin:     Findings: Erythema present.   Neurological:      General: No focal deficit present.      Mental Status: He is alert and oriented to person, place, and time.   Psychiatric:      Comments: Flat affect       Meds:    Current Facility-Administered Medications:     potassium chloride SA    magnesium sulfate    lisinopril    metoprolol SR    tamsulosin    spironolactone    influenza vaccine quad    HYDROcodone-acetaminophen    [COMPLETED] amiodarone **FOLLOWED BY** [COMPLETED] amiodarone **FOLLOWED BY** [START ON 2022] amiodarone    ceFAZolin    insulin regular **AND** POC blood glucose manual result **AND** NOTIFY MD  and PharmD **AND** Administer 20 grams of glucose (approximately 8 ounces of fruit juice) every 15 minutes PRN FSBG less than 70 mg/dL **AND** dextrose bolus    acetaminophen    levothyroxine    apixaban    traZODone    Labs:  Recent Labs     11/14/22  0156 11/15/22  0219 11/16/22  0350   WBC 12.7* 11.8* 10.5   RBC 3.48* 3.66* 3.54*   HEMOGLOBIN 10.6* 10.8* 10.6*   HEMATOCRIT 31.9* 33.6* 32.3*   MCV 91.7 91.8 91.2   MCH 30.5 29.5 29.9   RDW 52.6* 53.4* 52.6*   PLATELETCT 238 269 266   MPV 10.4 10.2 10.7   NEUTSPOLYS 82.60*  --  73.60*   LYMPHOCYTES 7.80*  --  14.60*   MONOCYTES 4.40  --  7.70   EOSINOPHILS 2.60  --  1.30   BASOPHILS 0.90  --  0.50   RBCMORPHOLO Present  --   --        Recent Labs     11/14/22  0156 11/15/22  0219 11/16/22  0350   SODIUM 135 134* 136   POTASSIUM 2.8* 3.6 2.9*   CHLORIDE 107 106 104   CO2 18* 20 22   GLUCOSE 103* 112* 98   BUN 22 22 20       Recent Labs     11/14/22  0156 11/15/22  0219 11/16/22  0350   ALBUMIN 2.7*  --   --    TBILIRUBIN 1.1  --   --    ALKPHOSPHAT 131*  --   --    TOTPROTEIN 6.0  --   --    ALTSGPT 6  --   --    ASTSGOT 26  --   --    CREATININE 1.02 0.99 1.03         Imaging:  CT-ABDOMEN-PELVIS W/O    Result Date: 11/5/2022 11/5/2022 9:05 AM HISTORY/REASON FOR EXAM:  Abdominal distention. TECHNIQUE/EXAM DESCRIPTION: CT scan of the abdomen and pelvis without contrast. Noncontrast helical scanning was obtained from the diaphragmatic domes through the pubic symphysis. Low dose optimization technique was utilized for this CT exam including automated exposure control and adjustment of the mA and/or kV according to patient size. COMPARISON: None. FINDINGS: Lower Chest: There is bibasilar atelectasis.. Liver: Normal. Spleen: Calcified granulomas. Pancreas: Atrophic. Gallbladder: There are multiple gallstones. Biliary: Nondilated. Adrenal glands: Normal. Kidneys: Unremarkable without hydronephrosis. Bowel: The colon is mildly distended and contains fluid.. Lymph nodes: No  adenopathy. Vasculature: Unremarkable. Peritoneum: Unremarkable without ascites. Musculoskeletal: There are chronic compression fractures of the spine.. Pelvis: No adenopathy or free fluid.     1.  No evidence of bowel obstruction or focal inflammatory change. 2.  Gallstones. 3.  Mildly distended colon which also contains fluid. No evidence of bowel obstruction.    DX-CHEST-PORTABLE (1 VIEW)    Result Date: 11/7/2022 11/7/2022 12:04 AM HISTORY/REASON FOR EXAM:  sepsis. TECHNIQUE/EXAM DESCRIPTION AND NUMBER OF VIEWS: Single portable view of the chest. COMPARISON:  11/5/2022. FINDINGS: LUNGS: Linear atelectasis in the right lower lung. No focal consolidation. Persistent interstitial edema. No effusions. PNEUMOTHORAX: None. LINES AND TUBES: Stable right IJ central catheter. MEDIASTINUM: Stable cardiac silhouette. MUSCULOSKELETAL STRUCTURES: Unchanged.     1.  Stable cardiomegaly and interstitial edema. 2.  Stable linear atelectasis in the right lower lung.     DX-CHEST-PORTABLE (1 VIEW)    Result Date: 11/5/2022 11/5/2022 8:17 AM HISTORY/REASON FOR EXAM: Central line placement TECHNIQUE/EXAM DESCRIPTION AND NUMBER OF VIEWS: Single portable view of the chest. COMPARISON: 11/5/2022 FINDINGS: There is a right IJ central line present with the tip overlying the upper right atrium. No evidence of pneumothorax. There is interstitial edema. There is linear atelectasis within the right midlung. There is no pleural effusion. The heart is enlarged.     1.  Cardiomegaly. There is also interstitial edema. 2.  Right IJ central line present without evidence of complication. 3.  Linear atelectasis within the right lung base.    DX-CHEST-PORTABLE (1 VIEW)    Result Date: 11/5/2022 11/5/2022 3:40 AM HISTORY/REASON FOR EXAM:  Sepsis TECHNIQUE/EXAM DESCRIPTION AND NUMBER OF VIEWS: Single portable view of the chest. COMPARISON: 5/25/2021 FINDINGS: HEART: Not enlarged. LUNGS: Hazy opacity right lower lung. PLEURA: No effusion or  pneumothorax.     1.  Hazy opacity at the right lower lung, similar to 2021 exam. Scarring versus pneumonia.    DX-ELBOW-COMPLETE 3+ LEFT    Result Date: 2022 11:29 AM HISTORY/REASON FOR EXAM:  Pain/Deformity Following Trauma TECHNIQUE/EXAM DESCRIPTION AND NUMBER OF VIEWS:  3 views of the LEFT elbow. COMPARISON: None FINDINGS: There is no evidence of joint effusion. There is no evidence of displaced fracture or dislocation. There is no focal soft tissue swelling.     Normal elbow series.    US-EXTREMITY NON VASCULAR UNILATERAL LEFT    Result Date: 2022 12:21 PM HISTORY/REASON FOR EXAM:  Left elbow swelling, redness, pain TECHNIQUE/EXAM DESCRIPTION AND NUMBER OF VIEWS: Ultrasound extremity nonvascular unilateral COMPARISON: X-ray yesterday FINDINGS: There is edema. No fluid collection identified.     Soft tissue edema in the area of concern about the left elbow without evidence of fluid collection/abscess.    EC-ECHOCARDIOGRAM COMPLETE W/ CONT    Result Date: 2022  Transthoracic Echo Report Echocardiography Laboratory CONCLUSIONS Severely reduced left ventricular systolic function. The left ventricular ejection fraction is visually estimated to be 25- 30%, but may be underestimated due to tachycardia. Global hypokinesis with more pronounced hypokinesis of the anterior and anterolateral walls. Diastolic function is abnormal, but grade cannot be determined. The right ventricle is not well visualized, but appears dilated with reduced systolic function. Biatrial dilation. Mild to moderate tricuspid regurgitation. Dilated inferior vena cava without inspiratory collapse. EMELY FLORES Exam Date:         2022                    11:46 Exam Location:     Inpatient Priority:          Routine Ordering Physician:        LESLIE ARMENTA Referring Physician: Sonographer:               Sheron Baxter RDCS Age:    57     Gender:    M MRN:    [id number] :    1965 BSA:    2.49    Ht (in):    72     Wt (lb):    290 Exam Type:     Complete, Contrast Indications:     Heart Failure, Systolic ICD Codes:       428.2 CPT Codes:       00528,  BP:   90     /   52     HR:   120 Technical Quality:       Fair MEASUREMENTS  (Male / Female) Normal Values 2D ECHO LV Diastolic Diameter PLAX        5.1 cm                4.2 - 5.9 / 3.9 - 5.3 cm LV Systolic Diameter PLAX         4.8 cm                2.1 - 4.0 cm IVS Diastolic Thickness           1 cm                  LVPW Diastolic Thickness          0.83 cm               LVOT Diameter                     2.2 cm                Estimated LV Ejection Fraction    30 %                  LV Ejection Fraction MOD BP       39.3 %                >= 55  % LV Ejection Fraction MOD 4C       41.2 %                LV Ejection Fraction MOD 2C       36.1 %                IVC Diameter                      2.5 cm                DOPPLER AV Peak Velocity                  1.2 m/s               AV Peak Gradient                  6 mmHg                AV Mean Gradient                  3.8 mmHg              LVOT Peak Velocity                0.85 m/s              AV Area Cont Eq vti               2.4 cm2               TV Peak E Velocity                0.51 m/s              TR Peak Velocity                  290 cm/s              * Indicates values subject to auto-interpretation LV EF:  30    % FINDINGS Left Ventricle Normal left ventricular chamber size. Normal left ventricular wall thickness. Severely reduced left ventricular systolic function. The left ventricular ejection fraction is visually estimated to be 25-30%, but may be underestimated due to tachycardia. Global hypokinesis with more pronounced hypokinesis of the anterior and anterolateral walls. Diastolic function is abnormal, but grade cannot be determined. 3 mL of contrast was used to enhance visualization of the endocardial border. Existing IV was used at the left elbow. Right Ventricle The right ventricle is not  "well visualized, but appears dilated with reduced systolic function. Estimated right ventricular systolic pressure is 50 mmHg Right Atrium The right atrium is not well visualized, but appears dilated. Dilated inferior vena cava without inspiratory collapse. Left Atrium The left atrium is not well visualized, but appears dilated. Mitral Valve The mitral valve is not well visualized. No stenosis or regurgitation seen. Aortic Valve The aortic valve is not well visualized. No stenosis or regurgitation seen. Tricuspid Valve No tricuspid stenosis. Mild to moderate tricuspid regurgitation. Pulmonic Valve Structurally normal pulmonic valve without significant stenosis or regurgitation. Pericardium Normal pericardium without effusion. Aorta Normal aortic root for body surface area. The ascending aorta diameter is 3.5 cm. Michael Acosta MD (Electronically Signed) Final Date:     05 November 2022                 14:02      Micro:  Results       Procedure Component Value Units Date/Time    C Diff by PCR rflx Toxin [[id number]] Collected: 11/15/22 0216    Order Status: Canceled Specimen: Stool     Cdiff By PCR Rflx Toxin [[id number]]     Order Status: Canceled     BLOOD CULTURE [[id number]] Collected: 11/07/22 1053    Order Status: Completed Specimen: Blood from Peripheral Updated: 11/12/22 1300     Significant Indicator NEG     Source BLD     Site PERIPHERAL     Culture Result No growth after 5 days of incubation.    Narrative:      Per Hospital Policy: Only change Specimen Src: to \"Line\" if  specified by physician order.  No site indicated    BLOOD CULTURE [[id number]] Collected: 11/07/22 1102    Order Status: Completed Specimen: Blood from Peripheral Updated: 11/12/22 1300     Significant Indicator NEG     Source BLD     Site PERIPHERAL     Culture Result No growth after 5 days of incubation.    Narrative:      Per Hospital Policy: Only change Specimen Src: to \"Line\" if  specified by physician order.  No site indicated      "       Assessment:  This is a 57-year-old male patient with type 2 diabetes, nonischemic cardiomyopathy, A. fib on amiodarone and Eliquis, prior hospitalization for bowel perforation, presented with 4 days of rigors, watery diarrhea, fevers has a chronic superficial left heel wound, was found to be in septic shock with acute renal failure and lactic acidosis, blood cultures positive for GPC in chains.  CT scan of the abdomen pelvis with no inflammation.  Chest x-ray with interstitial opacities.  TTE with EF of 25 to 30%, no obvious valvular vegetations were noted.    Pertinent diagnoses:  Group A streptococcal bacteremia  Left elbow effusion, concerning for septic arthritis given clinical presentation  Leukocytosis, resolved  Type 2 diabetes mellitus  Atrial fibrillation on amiodarone and Eliquis  Nonischemic cardiomyopathy  Diarrhea    Plan:  -Continue IV cefazolin 2 g every 8 hours  -Blood cultures on 11/5 positive for Group A Strep  -Repeat blood cultures on 11/7-negative to date  -Multiple allergies including Unasyn, Zosyn, patient prefers not to attempt challenge.    -MRI without any evidence of osteomyelitis; subcutaneous edema along the posterior lateral elbow possibly related to cellulitis.  Small elbow joint effusion  -Repeat blood cultures x2 from 11/7 no growth to date  -IR unable to aspirate fluid in left joint secondary to insufficient amount  -Anticipate a 4 to course of IV antibiotics from 11/7 with stop date of 12/5/2022  -Continue to monitor patient's left elbow for any signs of worsening infection    Disposition: Skilled nursing facility    Need for midline: Yes    Follow-up in the ID clinic    I have performed a physical exam and reviewed and updated ROS and plan today 11/16/2022.  In review of yesterday's note 11/15/2022, there are no changes except as documented above.    Discussed with internal medicine, Dr. Montana.  ID signing off.  Please reconsult if needed

## 2022-11-16 NOTE — DISCHARGE PLANNING
DC Transport Scheduled    Received request at: 11/16/2022 1440    Transport Company Scheduled:  [employer]  Spoke with Jennifer at [facility] to schedule transport.  [facility] Trip #: [id number] (delete if not [facility])    Scheduled Date: 11/17/2022  Scheduled Time: 1400    Destination: [facility]  [address]     Notified care team of scheduled transport via [employer].     If there are any changes needed to the DC transportation scheduled, please contact [facility] at ext. [phone] between the hours of 9007-1202 Mon-Fri. If outside those hours, contact the ED Case Manager at ext. [phone].

## 2022-11-16 NOTE — DISCHARGE PLANNING
Agency/Facility Name: [facility]  Outcome: DPA left v-mail informing SNF that Ancef Q8 is good through 12/05, per RN BRANDON. KALIA provided callback.

## 2022-11-16 NOTE — PROGRESS NOTES
Hospital Medicine Daily Progress Note    Date of Service  11/16/2022    Chief Complaint  Sebastián Castro is a 57 y.o. male admitted 11/5/2022 with septic shock.    Hospital Course:    57-year-old male with a past medical history of morbid obesity, permanent A. fib on amiodarone and Eliquis, non-insulin-dependent diabetes mellitus type 2, nonischemic cardiomyopathy and prior hospitalization for bowel perforation status post expiratory laparotomy presents to the hospital with complaint of rigors on 11/5/2022.  On presentation patient reported that he has been having symptoms of diarrhea and rigors for the last 4 days which is associated with fevers.  He has been living at the skilled nursing facility for the past 1 year.  He reported generalized weakness and lightheadedness with visual changes on admission.  On admission he found to have septic shock and received IV fluid bolus and started him on IV antibiotics and he was placed on pressor due to hypotension with a target map of greater than 65.  Infectious disease consulted for positive blood cultures 2/2 with strep A, echo did not show any signs of endocarditis, repeat blood culture was negative.  He was placed on cefepime and Flagyl.  There would be possible source of joint infection of left elbow and he underwent ultrasound did not show fluid collection. MRI elbow obtained showing no evidence of septic arthritis, however joint effusion present. Orthopedic surgery consulted for possible aspiration of joint.  Case was discussed with orthopedic and IR for possible arthrocentesis however there is no any free fluid for tapping.  Continue Ancef for 4 weeks per ID recommendation, and last day will be 12/5/2022.      On admission patient had acute kidney injury with creatinine around 3, with IV fluid and antibiotics were resolved and creatinine 0.9.    Patient has history of atrial fibrillation and heart failure, echo during this admission showed global hypokinesis ejection  fraction 30%, will continue home medication including Eliquis and follow-up with cardiology clinic.    Interval Problem Update  -Evaluated examined the patient at bedside, patient is alert and oriented x4, denied any fever  -Improving leukocytosis  -Improving on his swelling  -Planning to discharge to his facility, discharge the patient to his SNF to continue IV antibiotics tomorrow to continue IV antibiotics      I have discussed this patient's plan of care and discharge plan at IDT rounds today with Case Management, Nursing, Nursing leadership, and other members of the IDT team.    Consultants/Specialty  critical care and infectious disease    Code Status  Full Code    Disposition  Patient is not medically cleared for discharge.   Patient might need couple days for medical clearance to continue antibiotics in SNF.  Anticipate discharge to SNF.  I have placed the appropriate orders for post-discharge needs.    Review of Systems  Review of Systems   Constitutional:  Negative for chills and fever.   Eyes:  Negative for blurred vision and pain.   Respiratory:  Negative for cough and shortness of breath.    Cardiovascular:  Negative for chest pain, palpitations and leg swelling.   Gastrointestinal:  Negative for abdominal pain, nausea and vomiting.   Genitourinary:  Negative for dysuria and urgency.   Musculoskeletal:  Positive for joint pain. Negative for back pain.   Skin:  Negative for itching and rash.   Neurological:  Negative for dizziness, sensory change, focal weakness and headaches.   Psychiatric/Behavioral:  Negative for substance abuse. The patient does not have insomnia.       Physical Exam  Temp:  [36.8 °C (98.2 °F)-37.1 °C (98.8 °F)] 36.8 °C (98.2 °F)  Pulse:  [77-90] 90  Resp:  [16-18] 18  BP: (109-158)/(62-88) 158/88  SpO2:  [92 %] 92 %    Physical Exam  Constitutional:       General: He is not in acute distress.     Appearance: He is not ill-appearing.   HENT:      Head: Normocephalic and atraumatic.       Right Ear: External ear normal.      Left Ear: External ear normal.      Mouth/Throat:      Pharynx: No oropharyngeal exudate or posterior oropharyngeal erythema.   Eyes:      Extraocular Movements: Extraocular movements intact.      Pupils: Pupils are equal, round, and reactive to light.   Cardiovascular:      Rate and Rhythm: Normal rate and regular rhythm.      Pulses: Normal pulses.      Heart sounds: Normal heart sounds.   Pulmonary:      Effort: Pulmonary effort is normal. No respiratory distress.      Breath sounds: Normal breath sounds. No wheezing or rales.   Abdominal:      General: Bowel sounds are normal. There is no distension.      Palpations: Abdomen is soft.      Tenderness: There is no abdominal tenderness. There is no guarding or rebound.   Musculoskeletal:         General: Swelling present. No tenderness.      Cervical back: Normal range of motion and neck supple.      Right lower leg: No edema.      Left lower leg: No edema.      Comments: Left elbow with some inflammation, no open wounds or drainage   Skin:     General: Skin is warm and dry.   Neurological:      General: No focal deficit present.      Mental Status: He is oriented to person, place, and time.      Cranial Nerves: No cranial nerve deficit.      Sensory: No sensory deficit.      Motor: No weakness.   Psychiatric:         Mood and Affect: Mood normal.         Behavior: Behavior normal.       Fluids    Intake/Output Summary (Last 24 hours) at 11/16/2022 1410  Last data filed at 11/16/2022 1200  Gross per 24 hour   Intake --   Output 800 ml   Net -800 ml       Laboratory  Recent Labs     11/14/22  0156 11/15/22  0219 11/16/22  0350   WBC 12.7* 11.8* 10.5   RBC 3.48* 3.66* 3.54*   HEMOGLOBIN 10.6* 10.8* 10.6*   HEMATOCRIT 31.9* 33.6* 32.3*   MCV 91.7 91.8 91.2   MCH 30.5 29.5 29.9   MCHC 33.2* 32.1* 32.8*   RDW 52.6* 53.4* 52.6*   PLATELETCT 238 269 266   MPV 10.4 10.2 10.7     Recent Labs     11/14/22  0156 11/15/22  0219  11/16/22  0350   SODIUM 135 134* 136   POTASSIUM 2.8* 3.6 2.9*   CHLORIDE 107 106 104   CO2 18* 20 22   GLUCOSE 103* 112* 98   BUN 22 22 20   CREATININE 1.02 0.99 1.03   CALCIUM 8.2* 8.1* 8.2*                   Imaging  IR-MIDLINE CATHETER INSERTION WO GUIDANCE > AGE 3   Final Result                  Ultrasound-guided midline placement performed by qualified nursing staff    as above.          MR-ELBOW-WITH & W/O LEFT   Final Result         1. No MR evidence of osteomyelitis.   2. Subcutaneous edema along the posterior lateral elbow could relate to cellulitis. Mild diffuse increased signal throughout the underlying elbow muscles could relate to atrophy or myositis.   3. Small elbow joint effusion, nonspecific, and favored to represent reactive changes rather than septic arthritis.      CT-ELBOW WITH LEFT   Final Result      1.  No bony abnormality indicative of osteomyelitis.   2.  Diffuse soft tissue swelling and induration about the elbow and proximal forearm most consistent with superficial and deep cellulitis.   3.  Note, CT is suboptimal for soft tissue detail and MRI of the elbow could be pursued if clinically indicated for management and would also be preferable for detection of early osteomyelitis.      DX-CHEST-PORTABLE (1 VIEW)   Final Result      1.  Stable cardiomegaly and interstitial edema.   2.  Stable linear atelectasis in the right lower lung.         US-EXTREMITY NON VASCULAR UNILATERAL LEFT   Final Result      Soft tissue edema in the area of concern about the left elbow without evidence of fluid collection/abscess.      EC-ECHOCARDIOGRAM COMPLETE W/ CONT   Final Result      DX-ELBOW-COMPLETE 3+ LEFT   Final Result      Normal elbow series.      CT-ABDOMEN-PELVIS W/O   Final Result      1.  No evidence of bowel obstruction or focal inflammatory change.      2.  Gallstones.      3.  Mildly distended colon which also contains fluid. No evidence of bowel obstruction.      DX-CHEST-PORTABLE (1 VIEW)    Final Result      1.  Cardiomegaly. There is also interstitial edema.      2.  Right IJ central line present without evidence of complication.      3.  Linear atelectasis within the right lung base.      DX-CHEST-PORTABLE (1 VIEW)   Final Result      1.  Hazy opacity at the right lower lung, similar to 5/25/2021 exam. Scarring versus pneumonia.      US-EXTREMITY VENOUS UPPER UNILAT LEFT    (Results Pending)        Assessment/Plan  * Swelling of left elbow joint  Assessment & Plan  Edema and Erythema of Left Elbow Joint - Native Joint  Significant Tenderness to palpation.   CT elbow without signs of abscess or septic arthritis, shows cellulitis; continue antibiotics  MRI elbow shows no septic arthritis, does show joint effusion  Orthopedic surgery and IR where consulted: No fluid for tapping   Likely cellulitis and continue Ancef      Acute kidney injury (HCC)  Assessment & Plan  Resolved   Likely related to sepsis and dehydration   initially Creatinine of 3.13 and GFR of 22  -Avoid Nephrotoxins and Renally Dose Meds   Resolved with sepsis improvement      Acute on chronic heart failure (HCC)  Assessment & Plan  Hx of Nonischemic Cardiomyopathy with ejection fraction 30% and improved to 60%  Echo from 2021 with EF of 55% and repeat echo showed 30% with global hypokinesis  Metoprolol, lisinopril, lasix, spironolactone  Follow up with cardiology as outpatient, patient might need ischemic cardiomyopathy work-up however since patient's tach and bacteremia,  Holding any work-up.  Control his atrial fibrillation    Streptococcal bacteremia  Assessment & Plan  Blood cultures positive for streptococcus, source unclear  Recent TTE negative for endocarditis  ID consulted and following          Continue IV Ancef 2g q 8 hrs   I discussed plan of care with him.    Thrombocytopenia (HCC)- (present on admission)  Assessment & Plan  Stable current platelet count is 137.  No signs of active bleeding.  Likely related to infection  and sepsis    Severe sepsis with septic shock (HCC)- (present on admission)  Assessment & Plan  Continue intravenous antimicrobial therapy  Shock has resolved - he is off vasopressor (levophed).  Sepsis is improving  Taper hydrocortisone, 50 mg IV daily    Gastroenteritis  Assessment & Plan  Started 4 days prior to Admission  CT Abdomen unremarkable.  Improving slightly.  Will continue to monitor  Antidiarrheals prn      Bacteremia due to Gram-positive bacteria  Assessment & Plan  Group A streptococcus Bacteremia, likely from cellulitis  Was in Septic Shock with acute respiratory failure and kidney failure  Initially given Fluid administration per sepsis protocol.   Started on Pressor Levophed on 11/5 -> D/Cd on 11/8.    Started on Hydrocortisone 11/6 - Stress dose, hx of adrenal insuff.  Abdominal CT and Chest Xray unremarkable.  TTE without evidence of endocarditis  UA mild UTI, culture negative to date.  Suspected left elbow as infection source.    ID consulted, pt started on cefepime and flagyl empirically.     On 11/6 daptomycin was added for Enterococcus coverage.    On 11/7 cefepime, flagyl, & daptomycin D/Cd. Started IV Ancef 2 g every 8 hours. Rpt Blood Cx obtained.   Not able to get fluid from his elbow joint  Likely cell repeat blood cultures negative  Continue Ancef per ID recommendations for at least for 4 weeks    Permanent atrial fibrillation (HCC)- (present on admission)  Assessment & Plan  Currently in atrial fibrillation with RVR.   Rate controlled  Continue amiodarone and metoprolol  Holding home eliquis pending plan for operative interventions, resume when appropriate    Hypothyroidism- (present on admission)  Assessment & Plan  Continue Synthroid    Nonischemic cardiomyopathy (HCC)  Assessment & Plan  Unclear etiology of CM  Left heart cath in 2018 was negative for CAD  EF 30% here, worse than prior  Continue home metoprolol, lisinopril; start aldactone  Follow up with cardiology as outpatient  for further management    Obesity due to excess calories with serious comorbidity- (present on admission)  Assessment & Plan  Will need counseling when clinically appropriate    Hypokalemia- (present on admission)  Assessment & Plan  From diarrhea  Replace         VTE prophylaxis: SCDs    I have performed a physical exam and reviewed and updated ROS and Plan today (11/16/2022). In review of yesterday's note (11/15/2022), there are no changes except as documented above.

## 2022-11-16 NOTE — PROCEDURES
Vascular Access Team    Date of Insertion: November 15, 2022  Arm Circumference: 36 cm  Internal length: 17 cm  External Length: 0 cm  Vein Occupancy %: 35%  Reason for Midline: Long term antibiotics  Labs: WBC 11.8, , BUN 22, Cr 0.99, GFR 87, INR n/a    Orders confirmed, vessel patency confirmed with ultrasound. Risks and benefits of procedure explained to patient and education regarding line associated bloodstream infections provided. Questions answered.     Power Midline placed in RUE per licensed provider order with ultrasound guidance. 4 Fr, single lumen Power Midline placed in cephalic vein after 1 attempt(s). 2 mL of 1% lidocaine injected intradermally, 21 gauge microintroducer needle was visualized entering the vein and modified Seldinger technique used. 17 cm catheter inserted with good blood return. Secured at 0 cm marker. Internal positioning stylet removed and verified to be intact. Each lumen flushed without resistance with 10 mL 0.9% normal saline. Midline secured with Biopatch and Tegaderm.     Midline placement is confirmed by nurse using ultrasound and ability to flush and draw blood. Midline is appropriate for use at this time.  Patient tolerated procedure well, without complications.  No X-ray is needed for placement confirmation.  Patient condition relayed to unit RN or ordering physician via this post procedure note in the EMR.     Ultrasound images uploaded to PACS and viewable in the EMR - yes  Ultrasound imaged printed and placed in paper chart - no     BARD Power Midline ref # [id number], Lot # [id number], Expiration Date November 30, 2023

## 2022-11-17 ENCOUNTER — APPOINTMENT (OUTPATIENT)
Dept: RADIOLOGY | Facility: MEDICAL CENTER | Age: 57
DRG: 871 | End: 2022-11-17
Attending: INTERNAL MEDICINE
Payer: MEDICARE

## 2022-11-17 ENCOUNTER — PATIENT OUTREACH (OUTPATIENT)
Dept: SCHEDULING | Facility: IMAGING CENTER | Age: 57
End: 2022-11-17
Payer: MEDICARE

## 2022-11-17 VITALS
TEMPERATURE: 98.1 F | OXYGEN SATURATION: 91 % | SYSTOLIC BLOOD PRESSURE: 125 MMHG | BODY MASS INDEX: 39.68 KG/M2 | RESPIRATION RATE: 18 BRPM | HEART RATE: 84 BPM | WEIGHT: 292.99 LBS | DIASTOLIC BLOOD PRESSURE: 63 MMHG | HEIGHT: 72 IN

## 2022-11-17 PROBLEM — R65.21 SEVERE SEPSIS WITH SEPTIC SHOCK (HCC): Status: RESOLVED | Noted: 2022-11-05 | Resolved: 2022-11-17

## 2022-11-17 PROBLEM — R78.81 STREPTOCOCCAL BACTEREMIA: Status: RESOLVED | Noted: 2022-11-08 | Resolved: 2022-11-17

## 2022-11-17 PROBLEM — B95.5 STREPTOCOCCAL BACTEREMIA: Status: RESOLVED | Noted: 2022-11-08 | Resolved: 2022-11-17

## 2022-11-17 PROBLEM — E87.1 HYPONATREMIA: Status: RESOLVED | Noted: 2017-11-11 | Resolved: 2022-11-17

## 2022-11-17 PROBLEM — E87.6 HYPOKALEMIA: Status: RESOLVED | Noted: 2017-11-11 | Resolved: 2022-11-17

## 2022-11-17 PROBLEM — N17.9 ACUTE KIDNEY INJURY (HCC): Status: RESOLVED | Noted: 2020-11-11 | Resolved: 2022-11-17

## 2022-11-17 PROBLEM — A41.9 SEVERE SEPSIS WITH SEPTIC SHOCK (HCC): Status: RESOLVED | Noted: 2022-11-05 | Resolved: 2022-11-17

## 2022-11-17 LAB
ALBUMIN SERPL BCP-MCNC: 2.6 G/DL (ref 3.2–4.9)
ALBUMIN/GLOB SERPL: 0.6 G/DL
ALP SERPL-CCNC: 116 U/L (ref 30–99)
ALT SERPL-CCNC: <5 U/L (ref 2–50)
ANION GAP SERPL CALC-SCNC: 9 MMOL/L (ref 7–16)
AST SERPL-CCNC: 23 U/L (ref 12–45)
BASOPHILS # BLD AUTO: 0.6 % (ref 0–1.8)
BASOPHILS # BLD: 0.06 K/UL (ref 0–0.12)
BILIRUB SERPL-MCNC: 1.2 MG/DL (ref 0.1–1.5)
BUN SERPL-MCNC: 19 MG/DL (ref 8–22)
CALCIUM SERPL-MCNC: 8.4 MG/DL (ref 8.5–10.5)
CHLORIDE SERPL-SCNC: 106 MMOL/L (ref 96–112)
CO2 SERPL-SCNC: 22 MMOL/L (ref 20–33)
CREAT SERPL-MCNC: 0.94 MG/DL (ref 0.5–1.4)
CRP SERPL HS-MCNC: 4.59 MG/DL (ref 0–0.75)
EOSINOPHIL # BLD AUTO: 0.13 K/UL (ref 0–0.51)
EOSINOPHIL NFR BLD: 1.2 % (ref 0–6.9)
ERYTHROCYTE [DISTWIDTH] IN BLOOD BY AUTOMATED COUNT: 52.6 FL (ref 35.9–50)
GFR SERPLBLD CREATININE-BSD FMLA CKD-EPI: 93 ML/MIN/1.73 M 2
GLOBULIN SER CALC-MCNC: 4.1 G/DL (ref 1.9–3.5)
GLUCOSE BLD STRIP.AUTO-MCNC: 88 MG/DL (ref 65–99)
GLUCOSE BLD STRIP.AUTO-MCNC: 92 MG/DL (ref 65–99)
GLUCOSE SERPL-MCNC: 103 MG/DL (ref 65–99)
HCT VFR BLD AUTO: 33.6 % (ref 42–52)
HGB BLD-MCNC: 11.2 G/DL (ref 14–18)
IMM GRANULOCYTES # BLD AUTO: 0.1 K/UL (ref 0–0.11)
IMM GRANULOCYTES NFR BLD AUTO: 0.9 % (ref 0–0.9)
LYMPHOCYTES # BLD AUTO: 1.41 K/UL (ref 1–4.8)
LYMPHOCYTES NFR BLD: 13.3 % (ref 22–41)
MAGNESIUM SERPL-MCNC: 1.7 MG/DL (ref 1.5–2.5)
MCH RBC QN AUTO: 30.8 PG (ref 27–33)
MCHC RBC AUTO-ENTMCNC: 33.3 G/DL (ref 33.7–35.3)
MCV RBC AUTO: 92.3 FL (ref 81.4–97.8)
MONOCYTES # BLD AUTO: 0.81 K/UL (ref 0–0.85)
MONOCYTES NFR BLD AUTO: 7.6 % (ref 0–13.4)
NEUTROPHILS # BLD AUTO: 8.09 K/UL (ref 1.82–7.42)
NEUTROPHILS NFR BLD: 76.4 % (ref 44–72)
NRBC # BLD AUTO: 0 K/UL
NRBC BLD-RTO: 0 /100 WBC
PLATELET # BLD AUTO: 286 K/UL (ref 164–446)
PMV BLD AUTO: 10.2 FL (ref 9–12.9)
POTASSIUM SERPL-SCNC: 3.4 MMOL/L (ref 3.6–5.5)
PROT SERPL-MCNC: 6.7 G/DL (ref 6–8.2)
RBC # BLD AUTO: 3.64 M/UL (ref 4.7–6.1)
SODIUM SERPL-SCNC: 137 MMOL/L (ref 135–145)
WBC # BLD AUTO: 10.6 K/UL (ref 4.8–10.8)

## 2022-11-17 PROCEDURE — 86140 C-REACTIVE PROTEIN: CPT

## 2022-11-17 PROCEDURE — 700102 HCHG RX REV CODE 250 W/ 637 OVERRIDE(OP): Performed by: INTERNAL MEDICINE

## 2022-11-17 PROCEDURE — A9270 NON-COVERED ITEM OR SERVICE: HCPCS | Performed by: STUDENT IN AN ORGANIZED HEALTH CARE EDUCATION/TRAINING PROGRAM

## 2022-11-17 PROCEDURE — 36415 COLL VENOUS BLD VENIPUNCTURE: CPT

## 2022-11-17 PROCEDURE — 700105 HCHG RX REV CODE 258: Performed by: INTERNAL MEDICINE

## 2022-11-17 PROCEDURE — 99239 HOSP IP/OBS DSCHRG MGMT >30: CPT | Performed by: INTERNAL MEDICINE

## 2022-11-17 PROCEDURE — A9270 NON-COVERED ITEM OR SERVICE: HCPCS | Performed by: INTERNAL MEDICINE

## 2022-11-17 PROCEDURE — 700102 HCHG RX REV CODE 250 W/ 637 OVERRIDE(OP): Performed by: STUDENT IN AN ORGANIZED HEALTH CARE EDUCATION/TRAINING PROGRAM

## 2022-11-17 PROCEDURE — 82962 GLUCOSE BLOOD TEST: CPT

## 2022-11-17 PROCEDURE — 80053 COMPREHEN METABOLIC PANEL: CPT

## 2022-11-17 PROCEDURE — 83735 ASSAY OF MAGNESIUM: CPT

## 2022-11-17 PROCEDURE — 93971 EXTREMITY STUDY: CPT | Mod: LT

## 2022-11-17 PROCEDURE — 700111 HCHG RX REV CODE 636 W/ 250 OVERRIDE (IP): Performed by: INTERNAL MEDICINE

## 2022-11-17 PROCEDURE — 85025 COMPLETE CBC W/AUTO DIFF WBC: CPT

## 2022-11-17 RX ORDER — POTASSIUM CHLORIDE 20 MEQ/1
40 TABLET, EXTENDED RELEASE ORAL ONCE
Status: COMPLETED | OUTPATIENT
Start: 2022-11-17 | End: 2022-11-17

## 2022-11-17 RX ORDER — SPIRONOLACTONE 25 MG/1
25 TABLET ORAL DAILY
Qty: 30 TABLET | Refills: 3 | Status: SHIPPED | OUTPATIENT
Start: 2022-11-18

## 2022-11-17 RX ADMIN — CEFAZOLIN 2 G: 2 INJECTION, POWDER, FOR SOLUTION INTRAMUSCULAR; INTRAVENOUS at 05:08

## 2022-11-17 RX ADMIN — METOPROLOL SUCCINATE 50 MG: 50 TABLET, EXTENDED RELEASE ORAL at 05:09

## 2022-11-17 RX ADMIN — SPIRONOLACTONE 25 MG: 25 TABLET ORAL at 05:09

## 2022-11-17 RX ADMIN — APIXABAN 5 MG: 5 TABLET, FILM COATED ORAL at 05:09

## 2022-11-17 RX ADMIN — LISINOPRIL 20 MG: 20 TABLET ORAL at 05:09

## 2022-11-17 RX ADMIN — POTASSIUM CHLORIDE 40 MEQ: 1500 TABLET, EXTENDED RELEASE ORAL at 08:17

## 2022-11-17 RX ADMIN — AMIODARONE HYDROCHLORIDE 200 MG: 200 TABLET ORAL at 05:09

## 2022-11-17 RX ADMIN — TAMSULOSIN HYDROCHLORIDE 0.4 MG: 0.4 CAPSULE ORAL at 08:18

## 2022-11-17 RX ADMIN — MICONAZOLE NITRATE: 20 CREAM TOPICAL at 05:09

## 2022-11-17 RX ADMIN — LEVOTHYROXINE SODIUM 150 MCG: 0.07 TABLET ORAL at 05:09

## 2022-11-17 NOTE — PROGRESS NOTES
Patient being discharged to [facility] around 14:00 today with [facility] transport. Nurse report attempted to call to facility with no answer x2. Patient changed into fresh gown and peripheral IV removed prior to discharge. Right upper arm midline remains in place for IV antibiotic therapy continuation. Patient aware and agreeable of discharge.

## 2022-11-17 NOTE — DISCHARGE PLANNING
Agency/Facility Name: [facility]   Outcome: [facility] called to verify Ancef Q8 is good through 12/5. DPA requested a callback.     1010  Agency/Facility Name: [facility]   Spoke To: Joselin   Outcome: [facility] called to verify Ancef Q8 is good, [facility] also requesting for PICC/Midline to stay on Pt for the antibiotics. [facility] confirmed 1400 transport time.

## 2022-11-17 NOTE — DISCHARGE SUMMARY
Discharge Summary    CHIEF COMPLAINT ON ADMISSION  Chief Complaint   Patient presents with    Weakness     The pt is coming from [facility] facility. Staff and pt report weakness with tremors x2 days. No fever, bp 88/60. 500ml LR was given. The pt afib on the monitor at 115. Breathsounds bilaterally equal. Pt reports one episode of diarrhea. Hx of a-fib, CHF, Diabetes. 22g left forearm       Reason for Admission  Bacteremia due to Streptococcus group A  Cellulitis on the left elbow    Admission Date  11/5/2022     CODE STATUS  Full Code    HPI & HOSPITAL COURSE    57-year-old male with a past medical history of morbid obesity, permanent A. fib on amiodarone and Eliquis, non-insulin-dependent diabetes mellitus type 2, nonischemic cardiomyopathy and prior hospitalization for bowel perforation status post expiratory laparotomy presents to the hospital with complaint of rigors on 11/5/2022.  On presentation patient reported that he has been having symptoms of diarrhea and rigors for the last 4 days which is associated with fevers.  He has been living at the skilled nursing facility for the past 1 year.  He reported generalized weakness and lightheadedness with visual changes on admission.  On admission he found to have septic shock and received IV fluid bolus and started him on IV antibiotics and he was placed on pressor due to hypotension with a target map of greater than 65.  Infectious disease consulted for positive blood cultures 2/2 with strep A, echo did not show any signs of endocarditis, repeat blood culture was negative.  He was placed on cefepime and Flagyl.  There would be possible source of joint infection of left elbow and he underwent ultrasound did not show fluid collection. MRI elbow obtained showing no evidence of septic arthritis, however joint effusion present. Orthopedic surgery consulted for possible aspiration of joint.  Case was discussed with orthopedic and IR for possible arthrocentesis however  there is no any free fluid for tapping.  Continue Ancef for 4 weeks per ID recommendation, and last day will be 12/5/2022.  His swelling and pain was improved and labs did not show an leukocytosis, to continue antibiotics at the skilled nursing facility.    Patient has history of heart failure and echo in 2020 showed ejection fraction 30 however was improved in 2021 with ejection fraction 55%, during this hospitalization echo was done and did not show any endocarditis however showed ejection fraction 30% with global hypokinesis, patient has history of atrial fibrillation, continue Eliquis with amiodarone, metoprolol and lisinopril, since patient has bacteremia during the hospitalization patient will get benefit from ischemia work-up as outpatient with the cardiology clinic.  Telemetry did not show any arrhythmia and patient did not need Lasix.    On the day of discharge the patient is alert and oriented x4, discussed the plan of care with the patient, answered all his questions, patient was not on oxygen therapy with no fever and stable for discharge to continue IV antibiotics.    Therefore, he is discharged in good and stable condition to skilled nursing facility.    The patient met 2-midnight criteria for an inpatient stay at the time of discharge.      FOLLOW UP ITEMS POST DISCHARGE  Follow-up with PCP for comorbidities  Follow-up with PCP and cardiology clinic for heart failure and start with Lasix if needed      DISCHARGE DIAGNOSES  Principal Problem:    Swelling of left elbow joint POA: Unknown  Active Problems:    Obesity due to excess calories with serious comorbidity POA: Yes    Nonischemic cardiomyopathy (HCC) POA: Unknown    Hypothyroidism POA: Yes    Permanent atrial fibrillation (HCC) POA: Yes    Bacteremia due to Gram-positive bacteria POA: Unknown    Gastroenteritis POA: Unknown    Thrombocytopenia (HCC) POA: Yes    Acute on chronic heart failure (HCC) POA: Unknown  Resolved Problems:    Acute kidney  injury (HCC) POA: Unknown    Hyponatremia POA: Unknown    Hypokalemia POA: Yes    Severe sepsis with septic shock ([facility]) POA: Yes    Streptococcal bacteremia POA: Unknown      FOLLOW UP  Future Appointments   Date Time Provider Department Center   11/21/2022  1:15 PM Presley Camara M.D. [facility] None   11/29/2022 10:00 AM Eric Lemos M.D. [facility] None   12/23/2022 12:15 PM [facility] EXAM 12 ECHO [facility]     Heart Failure Education  Please access the AHA My HF Guide/Heart Failure Interactive Workbook:   [email or web address]  Follow up  Please visit this website as soon as possible for information on how to manage your heart failure at home. Thank you, [facility]    [address] [address]  [phone]        ELIZABETH Terrazas  [address] [place] [id number] [phone]      Please call your primary care provider to schedule a hospital follow up. Thank you.      MEDICATIONS ON DISCHARGE     Medication List        START taking these medications        Instructions   NS SOLN 100 mL with ceFAZolin 2 g SOLR 2 g   Infuse 2 g into a venous catheter every 8 hours for 18 days.  Dose: 2 g     spironolactone 25 MG Tabs  Start taking on: November 18, 2022  Commonly known as: ALDACTONE   Take 1 Tablet by mouth every day.  Dose: 25 mg            CONTINUE taking these medications        Instructions   acetaminophen 325 MG Tabs  Commonly known as: Tylenol   Take 650 mg by mouth every 6 hours as needed. Indications: Pain  Dose: 650 mg     amiodarone 200 MG Tabs  Commonly known as: Cordarone   Take 1 Tablet by mouth every day.  Dose: 200 mg     apixaban 5mg Tabs  Commonly known as: ELIQUIS   Take 1 Tablet by mouth 2 times a day. Indications: Thromboembolism secondary to Atrial Fibrillation  Dose: 5 mg     clonazePAM 0.5 MG Tabs  Commonly known as: KLONOPIN   Take  by mouth 2 times a day.     levothyroxine 150 MCG  Tabs  Commonly known as: SYNTHROID   Take 1 tablet by mouth every morning on an empty stomach.  Dose: 150 mcg     lisinopril 20 MG Tabs  Commonly known as: PRINIVIL   Take 20 mg by mouth every day.  Dose: 20 mg     magnesium oxide 400 MG Tabs tablet  Commonly known as: MAG-OX   Take 1 tablet by mouth 2 times a day.  Dose: 400 mg     metoprolol SR 25 MG Tb24  Commonly known as: TOPROL XL   Take 50 mg by mouth every day. 2 tablets = 50 mg  Dose: 50 mg     omeprazole 40 MG delayed-release capsule  Commonly known as: PRILOSEC   Take 1 capsule by mouth every 12 hours.  Dose: 40 mg     PARoxetine 30 MG Tabs  Commonly known as: PAXIL   Take 30 mg by mouth every day.  Dose: 30 mg     psyllium Pack  Commonly known as: METAMUCIL/KONSYL   Take 1 Packet by mouth every day.  Dose: 1 Packet     ROPINIRole 0.25 MG Tabs  Commonly known as: REQUIP   Take 0.5 mg by mouth at bedtime.  Dose: 0.5 mg     tamsulosin 0.4 MG capsule  Commonly known as: FLOMAX   Take 1 capsule by mouth 1/2 hour after breakfast.  Dose: 0.4 mg     therapeutic multivitamin-minerals Tabs   Take 1 Tablet by mouth every day.  Dose: 1 Tablet     traZODone 50 MG Tabs  Commonly known as: DESYREL   Take 50 mg by mouth every evening.  Dose: 50 mg     vitamin D 1000 UNIT Tabs  Commonly known as: VITAMIND D3   Take 1 tablet by mouth every day.  Dose: 1,000 Units     ziprasidone 40 MG Caps  Commonly known as: GEODON   Take 40 mg by mouth 2 times a day.  Dose: 40 mg              Allergies  Allergies   Allergen Reactions    Zosyn [Piperacillin Sod-Tazobactam So] Rash and Itching     Redness/flushed throughout body.     Daptomycin Rash     Body rash  RXN=1/2018      Pcn [Penicillins] Hives and Rash      Rash & hives  RXN=since a kid  Tolerates cephalosporins    Unasyn [Ampicillin-Sulbactam Sodium] Hives, Itching and Swelling     Received doses as recently as 2017 without incident. On 2/18/2018 developed a rash after receiving Unasyn.    Vancomycin Rash     Red man syndrome.  Tolerates IV vancomycin at reduced infusion rate.       DIET  Orders Placed This Encounter   Procedures    Diet Order Diet: Level 6 - Soft and Bite Sized (okay to resume previous diet); Liquid level: Level 0 - Thin; Second Modifier: (optional): Consistent CHO (Diabetic)     Standing Status:   Standing     Number of Occurrences:   1     Order Specific Question:   Diet:     Answer:   Level 6 - Soft and Bite Sized [23]     Comments:   okay to resume previous diet     Order Specific Question:   Liquid level     Answer:   Level 0 - Thin     Order Specific Question:   Second Modifier: (optional)     Answer:   Consistent CHO (Diabetic) [4]       ACTIVITY  As tolerated.  Weight bearing as tolerated    LINES, DRAINS, AND WOUNDS  This is an automated list. Peripheral IVs will be removed prior to discharge.  Midline IV 11/15/22 Anterior;Right Upper arm (Active)   Site Assessment Clean;Dry;Intact 11/17/22 0800   Dressing Type Biopatch;Occlusive;Securing device;Skin barrier;Transparent 11/17/22 0800   Line Status Capped 11/17/22 0800   Dressing Status Clean;Dry;Intact 11/17/22 0800   Dressing Intervention N/A 11/17/22 0800   Dressing Change Due 11/19/22 11/15/22 1849   Infiltration Grading ([facility], [facility]) 0 11/17/22 0800   Phlebitis Scale ([facility] Only) 0 11/17/22 0800       Peripheral IV 11/05/22 20 G Right Forearm (Active)   Site Assessment Clean;Dry;Intact 11/17/22 0800   Dressing Type Transparent 11/17/22 0800   Line Status Capped 11/17/22 0800   Dressing Status Clean;Dry;Intact 11/17/22 0800   Dressing Intervention N/A 11/17/22 0800   Dressing Change Due 11/12/22 11/10/22 0800   Date Primary Tubing Changed 11/15/22 11/15/22 2100   Date Secondary Tubing Changed 11/12/22 11/12/22 2100   NEXT Primary Tubing Change  11/19/22 11/15/22 2100   NEXT Secondary Tubing Change  11/19/22 11/12/22 2100   Infiltration Grading ([facility], [facility]) 0 11/17/22 0800   Phlebitis Scale ([facility] Only) 0 11/17/22 0800       Moisture Associated Skin Damage  02/09/21 Anterior (Active)       Moisture Associated Skin Damage 11/15/22 Groin;Panus (Active)   Drainage Amount Scant 11/15/22 0900   Drainage Description Clear 11/15/22 0900   Periwound Assessment Pink 11/15/22 0900   IAD Cleansing Foam Cleanser/Washcloth 11/17/22 0800   Periwound Protectant Moisture Barrier 11/17/22 0800   IAD Containment Device Interdry Cloth 11/15/22 0900       Wound 05/27/21 Full Thickness Wound Abdomen (Active)       Wound 11/05/22 Pressure Injury Heel Left stage 3 POA (Active)   Wound Image   11/06/22 1400   Site Assessment Yellow;Pink;Drainage;Eschar 11/16/22 1115   Periwound Assessment Excoriated 11/16/22 1115   Margins Undefined edges 11/16/22 1115   Closure None 11/13/22 0118   Drainage Amount None 11/16/22 1115   Drainage Description Serosanguineous 11/13/22 0118   Treatments Site care;Offloading 11/17/22 0800   Wound Cleansing Approved Wound Cleanser 11/16/22 1115   Periwound Protectant Skin Protectant Wipes to Periwound 11/11/22 0400   Dressing Cleansing/Solutions Not Applicable 11/11/22 0400   Dressing Options Mepilex Heel 11/17/22 0800   Dressing Changed Observed 11/16/22 1115   Dressing Status Clean;Dry;Intact 11/16/22 2000   Dressing Change/Treatment Frequency Every 72 hrs, and As Needed 11/16/22 1115   NEXT Dressing Change/Treatment Date 11/18/22 11/15/22 1800   NEXT Weekly Photo (Inpatient Only) 11/16/22 11/15/22 1800   WOUND NURSE ONLY - Pressure Injury Stage 3 11/06/22 1400   Wound Length (cm) 2 cm 11/06/22 1400   Wound Width (cm) 1.2 cm 11/06/22 1400   Wound Depth (cm) 0.1 cm 11/06/22 1400   Wound Surface Area (cm^2) 2.4 cm^2 11/06/22 1400   Wound Volume (cm^3) 0.24 cm^3 11/06/22 1400   WOUND NURSE ONLY - Time Spent with Patient (mins) 60 11/06/22 1400       Wound 11/05/22 Pressure Injury Coccyx sDTI POA -> S2 as of 11/16 (Active)   Wound Image   11/16/22 1200   Site Assessment Granulation tissue 11/16/22 1200   Periwound Assessment Maceration;Fragile 11/16/22 1200   Margins  Defined edges;Attached edges 11/16/22 1200   Closure Open to air 11/16/22 1200   Drainage Amount Scant 11/16/22 1200   Drainage Description Serosanguineous 11/16/22 1200   Treatments Offloading;Site care 11/17/22 0800   Wound Cleansing Soap and Water 11/17/22 0800   Periwound Protectant Barrier Paste 11/17/22 0800   Dressing Cleansing/Solutions Not Applicable 11/16/22 1200   Dressing Options Open to Air 11/16/22 1200   Dressing Changed Changed 11/16/22 1200   Dressing Status Open to Air 11/16/22 1200   Dressing Change/Treatment Frequency As Needed 11/16/22 1200   NEXT Weekly Photo (Inpatient Only) 11/23/22 11/16/22 1200   WOUND NURSE ONLY - Pressure Injury Stage 2 11/16/22 1200   Wound Length (cm) 2.5 cm 11/06/22 1400   Wound Width (cm) 3.7 cm 11/06/22 1400   Wound Surface Area (cm^2) 9.25 cm^2 11/06/22 1400   Shape Round 11/16/22 1200   Exposed Structures None 11/16/22 1200   WOUND NURSE ONLY - Time Spent with Patient (mins) 20 11/16/22 1200       Wound 11/07/22 Pressure Injury Thigh Proximal;Upper Right (Active)   Wound Image   11/07/22 2000   Site Assessment Pink;Red 11/16/22 1115   Periwound Assessment Intact 11/17/22 0800   Margins Attached edges 11/16/22 1115   Closure Open to air 11/15/22 0900   Drainage Amount None 11/16/22 1115   Treatments Cleansed;Site care 11/16/22 1115   Wound Cleansing Foam Cleanser/Washcloth 11/16/22 1115   Periwound Protectant Not Applicable 11/16/22 1115   Dressing Options Open to Air 11/16/22 2000   Dressing Changed New 11/15/22 1800   Dressing Status Clean;Dry;Intact 11/15/22 1800   Dressing Change/Treatment Frequency As Needed 11/08/22 0400       Peripheral IV 11/05/22 20 G Right Forearm (Active)   Site Assessment Clean;Dry;Intact 11/17/22 0800   Dressing Type Transparent 11/17/22 0800   Line Status Capped 11/17/22 0800   Dressing Status Clean;Dry;Intact 11/17/22 0800   Dressing Intervention N/A 11/17/22 0800   Dressing Change Due 11/12/22 11/10/22 0800   Date Primary Tubing  Changed 11/15/22 11/15/22 2100   Date Secondary Tubing Changed 11/12/22 11/12/22 2100   NEXT Primary Tubing Change  11/19/22 11/15/22 2100   NEXT Secondary Tubing Change  11/19/22 11/12/22 2100   Infiltration Grading ([facility], [facility]) 0 11/17/22 0800   Phlebitis Scale ([facility] Only) 0 11/17/22 0800               MENTAL STATUS ON TRANSFER             CONSULTATIONS  Infectious disease  Orthopedic  IR    PROCEDURES  None    LABORATORY  Lab Results   Component Value Date    SODIUM 137 11/17/2022    POTASSIUM 3.4 (L) 11/17/2022    CHLORIDE 106 11/17/2022    CO2 22 11/17/2022    GLUCOSE 103 (H) 11/17/2022    BUN 19 11/17/2022    CREATININE 0.94 11/17/2022        Lab Results   Component Value Date    WBC 10.6 11/17/2022    HEMOGLOBIN 11.2 (L) 11/17/2022    HEMATOCRIT 33.6 (L) 11/17/2022    PLATELETCT 286 11/17/2022        Total time of the discharge process exceeds 36 minutes.

## 2022-11-17 NOTE — HEART FAILURE PROGRAM
Patient resides at [facility] and they usually bring patient to his f/u appointments. Per discharge plan notes, he should be returning there soon. If it starts to look like patient won't discharge by 11/18/22, this appointment should be pushed out: schedulers at [id number] can help with this.

## 2022-11-17 NOTE — CARE PLAN
The patient is Stable - Low risk of patient condition declining or worsening    Shift Goals  Clinical Goals: monitor electrolytes  Patient Goals: pain control and comfort  Family Goals: VON    Progress made toward(s) clinical / shift goals:    Problem: Knowledge Deficit - Standard  Goal: Patient and family/care givers will demonstrate understanding of plan of care, disease process/condition, diagnostic tests and medications  Outcome: Progressing     Problem: Skin Integrity  Goal: Skin integrity is maintained or improved  Outcome: Progressing     Problem: Fall Risk  Goal: Patient will remain free from falls  Outcome: Progressing     Problem: Pain - Standard  Goal: Alleviation of pain or a reduction in pain to the patient’s comfort goal  Outcome: Progressing       Patient is not progressing towards the following goals:

## 2022-11-21 NOTE — PROGRESS NOTES
Patient Education     Acute Otitis Media with Infection (Child)    Your child has a middle ear infection (acute otitis media). It's caused by bacteria or viruses. The middle ear is the space behind the eardrum. The eustachian tube connects the ear to the nasal passage. The eustachian tubes help drain fluid from the ears. They also keep the air pressure equal inside and outside the ears. These tubes are shorter and more horizontal in children. This makes it more likely for the tubes to become blocked. A blockage lets fluid and pressure build up in the middle ear. Bacteria or fungi can grow in this fluid and cause an ear infection. This infection is commonly known as an earache.   The main symptom of an ear infection is ear pain. Other symptoms may include pulling at the ear, being more fussy than usual, fever, decreased appetite, and vomiting or diarrhea. Your child’s hearing may also be affected. Your child may have had a respiratory infection first.   An ear infection may clear up on its own. Or your child may need to take medicine. After the infection goes away, your child may still have fluid in the middle ear. It may take weeks or months for this fluid to go away. During that time, your child may have temporary hearing loss. But all other symptoms of the earache should be gone.   Home care  Follow these guidelines when caring for your child at home:  The healthcare provider will likely prescribe medicines for pain. The provider may also prescribe antibiotics to treat the infection. These may be liquid medicines to give by mouth. Or they may be ear drops. Follow the provider’s instructions for giving these medicines to your child.  Don't give your child any other medicine without first asking your child's healthcare provider, especially the first time.  Because ear infections can clear up on their own, the provider may suggest waiting for a few days before giving your child medicines for infection.  To reduce  Patient discharged home, [facility] cab arrived. Patient wheeled out in wheelchair, All belongings with patient. Patient verbalized understanding of discharge plan. Home health. PICC line declotted prior to schedule abx. Flushed, some blood return but not a lot.    pain, have your child rest in an upright position. Hot or cold compresses held against the ear may help ease pain.  Don't smoke in the house or around your child. Keep your child away from secondhand smoke.  To help prevent future infections:  Don't smoke near your child. Secondhand smoke raises the risk for ear infections in children.  Make sure your child gets all appropriate vaccines.  Don't bottle-feed while your baby is lying on his or her back. (This position can cause middle ear infections because it allows milk to run into the eustachian tubes.)      If you breastfeed, continue until your child is 6 to 12 months of age.  To apply ear drops:  Put the bottle in warm water if the medicine is kept in the refrigerator. Cold drops in the ear are uncomfortable.  Have your child lie down on a flat surface. Gently hold your child’s head to one side.  Remove any drainage from the ear with a clean tissue or cotton swab. Clean only the outer ear. Don’t put the cotton swab into the ear canal.  Straighten the ear canal by gently pulling the earlobe up and back.  Keep the dropper a half-inch above the ear canal. This will keep the dropper from becoming contaminated. Put the drops against the side of the ear canal.  Have your child stay lying down for 2 to 3 minutes. This gives time for the medicine to enter the ear canal. If your child doesn’t have pain, gently massage the outer ear near the opening.  Wipe any extra medicine away from the outer ear with a clean cotton ball.    Follow-up care  Follow up with your child’s healthcare provider as directed. Your child will need to have the ear rechecked to make sure the infection has gone away. Check with the healthcare provider to see when they want to see your child.   Special note to parents  If your child continues to get earaches, he or she may need ear tubes. The provider will put small tubes in your child’s eardrum to help keep fluid from building up. This procedure is a  simple and works well.   When to seek medical advice  Call your child's healthcare provider for any of the following:   Fever (see Fever and children, below)  New symptoms, especially swelling around the ear or weakness of face muscles  Severe pain  Infection seems to get worse, not better   Neck pain  Your child acts very sick or not himself or herself  Fever or pain don't improve with antibiotics after 48 hours  Fever and children  Use a digital thermometer to check your child’s temperature. Don’t use a mercury thermometer. There are different kinds and uses of digital thermometers. They include:   Rectal. For children younger than 3 years, a rectal temperature is the most accurate.  Forehead (temporal). This works for children age 3 months and older. If a child under 3 months old has signs of illness, this can be used for a first pass. The provider may want to confirm with a rectal temperature.  Ear (tympanic). Ear temperatures are accurate after 6 months of age, but not before.  Armpit (axillary). This is the least reliable but may be used for a first pass to check a child of any age with signs of illness. The provider may want to confirm with a rectal temperature.  Mouth (oral). Don’t use a thermometer in your child’s mouth until he or she is at least 4 years old.  Use the rectal thermometer with care. Follow the product maker’s directions for correct use. Insert it gently. Label it and make sure it’s not used in the mouth. It may pass on germs from the stool. If you don’t feel OK using a rectal thermometer, ask the healthcare provider what type to use instead. When you talk with any healthcare provider about your child’s fever, tell him or her which type you used.   Below are guidelines to know if your young child has a fever. Your child’s healthcare provider may give you different numbers for your child. Follow your provider’s specific instructions.   Fever readings for a baby under 3 months old:   First, ask  your child’s healthcare provider how you should take the temperature.  Rectal or forehead: 100.4°F (38°C) or higher  Armpit: 99°F (37.2°C) or higher  Fever readings for a child age 3 months to 36 months (3 years):   Rectal, forehead, or ear: 102°F (38.9°C) or higher  Armpit: 101°F (38.3°C) or higher  Call the healthcare provider in these cases:   Repeated temperature of 104°F (40°C) or higher in a child of any age  Fever of 100.4° F (38° C) or higher in baby younger than 3 months  Fever that lasts more than 24 hours in a child under age 2  Fever that lasts for 3 days in a child age 2 or older    [employer] last reviewed this educational content on 4/1/2020 © 2000-2021 [employer]. All rights reserved. This information is not intended as a substitute for professional medical care. Always follow your healthcare professional's instructions.         Thank you for visiting the [facility].    Please understand this is a provisional diagnosis that can and may change. The diagnosis that you are discharged with is based on the symptoms you described and the diagnostic information available today. If any new symptoms occur or worsen, you should seek immediate re-evaluation at the nearest emergency department. If any symptoms persist, please follow up for reassessment with your primary care provider.     It is difficult to recognize all elements of any illness or injury in a single visit. The examination, treatment, and x-rays received are on a preliminary basis only.  Call your primary care provider if you have questions or problems before your next appointment. If you are unable to  reach your Primary Care Provider (PCP), please call or return to the Urgent Care Clinic.  If symptoms worsen or do not resolve please follow-up with your Primary Care Provider (PCP), walk-in or the nearest  emergency room for emergency symptoms.  If you are unsure of whom to follow up with, call     [phone]  ([facility]) or   [phone] ([facility]) or   [phone] ([facility])     and ask to speak with either your Primary Care Provider, the urgent care nurse or the on-call provider if you are calling after hours.      If you are referred to a specialist or scheduled for a test, our referrals department will call you with your appointment date and time within 3 business days. If you have not heard from them in this time frame, please call numbers mentioned above and ask for the referrals department.     Clinic Hours [facility]  [place]:    [place]  Monday through Friday: 7 am to 7 pm   M-Sun: 7 am to 7 pm   Sat/Sun: 7-3 pm          Holiday hours may vary.     Help us to grow our quality of service!  We want to improve - and you can help!  You may receive a survey by email or mail.  This is your opportunity to tell us where we could use improvement or what excellent service you received.  We value your input!     Thank you again for visiting [facility]    Marline Fuchs PA-C

## 2022-11-29 ENCOUNTER — OFFICE VISIT (OUTPATIENT)
Dept: CARDIOLOGY | Facility: MEDICAL CENTER | Age: 57
End: 2022-11-29
Attending: NURSE PRACTITIONER
Payer: MEDICARE

## 2022-11-29 VITALS
SYSTOLIC BLOOD PRESSURE: 124 MMHG | DIASTOLIC BLOOD PRESSURE: 82 MMHG | BODY MASS INDEX: 38.6 KG/M2 | OXYGEN SATURATION: 90 % | WEIGHT: 285 LBS | RESPIRATION RATE: 12 BRPM | HEIGHT: 72 IN | HEART RATE: 79 BPM

## 2022-11-29 DIAGNOSIS — I48.11 LONGSTANDING PERSISTENT ATRIAL FIBRILLATION (HCC): ICD-10-CM

## 2022-11-29 PROCEDURE — 93000 ELECTROCARDIOGRAM COMPLETE: CPT | Performed by: INTERNAL MEDICINE

## 2022-11-29 PROCEDURE — 99204 OFFICE O/P NEW MOD 45 MIN: CPT | Mod: 25 | Performed by: INTERNAL MEDICINE

## 2022-11-29 RX ORDER — METOPROLOL SUCCINATE 100 MG/1
100 TABLET, EXTENDED RELEASE ORAL DAILY
COMMUNITY
Start: 2022-11-29

## 2022-11-29 ASSESSMENT — FIBROSIS 4 INDEX: FIB4 SCORE: 2.24

## 2022-11-29 NOTE — PROGRESS NOTES
Arrhythmia Clinic Note (New patient)     DOS: 11/29/2022    Referring physician: Betty Mora    Chief complaint/Reason for consult: Afib    HPI: 56 y/o M with longstanding persistent afib and very complex GI surgical history, NICM associated with RVR with normalization of EF at controlled rates. He was just hospitalized for weakness and was discharged, this appointment appears to be a post hospitalization followup, it is unclear why this was made with an electrophysiologist. He has no complaints today. During the hospitalization he had some Afib with RVR and depressed EF. He has a follow up echo scheduled in December already.     ROS (+ highlighted in bold):  Constitutional: Fevers/chills/fatigue/weightloss  HEENT: Blurry vision/eye pain/sore throat/hearing loss  Respiratory: Shortness of breath/cough  Cardiovascular: Chest pain/palpitations/edema/orthopnea/syncope  GI: Nausea/vomitting/diarrhea  MSK: Arthralgias/myagias/muscle weakness  Skin: Rash/sores  Neurological: Numbness/tremors/vertigo  Endocrine: Excessive thirst/polyuria/cold intolerance/heat intolerance  Psych: Depression/anxiety    Past Medical History:   Diagnosis Date    A-fib (HCC)     Abscess 11/8/2020    Bacteremia 11/8/2020    Chest pain     Congestive heart failure (HCC)     Diabetes (HCC)     Diabetic neuropathy (HCC)     Hypercholesterolemia     Hypertension     Longstanding persistent atrial fibrillation (HCC) 11/8/2020    LV dysfunction 11/8/2020    Morbid obesity (HCC)     NICM (nonischemic cardiomyopathy) (HCC) 11/8/2020    Noncompliance     Normal cardiac stress test     Orthostatic hypotension     Sepsis (HCC) 11/8/2020       Past Surgical History:   Procedure Laterality Date    NC EXPLORATORY OF ABDOMEN  5/27/2021    Procedure: LAPAROTOMY, EXPLORATORY;  Surgeon: Kin Desouza D.O.;  Location: SURGERY [facility];  Service: General    COLOSTOMY TAKEDOWN  5/27/2021    Procedure: COLOSTOMY TAKEDOWN AND CLOSURE;  Surgeon: Kin Desouza  PILLO;  Location: SURGERY [facility];  Service: General    COLECTOMY N/A 11/10/2020    Procedure: COLECTOMY-SEGMENTAL, COLOSTOMY, MOBILIZATION OF SPLENIC FLEXURE, WOUND VAC PLACEMENT, IRRIGATION AND DEBRIDMENT FLANK WOUND;  Surgeon: Kin Desouza D.O.;  Location: SURGERY [facility];  Service: General    ZZZ CARDIAC CATH  07/21/2018    EF 45%, normal coronaries.    IRRIGATION & DEBRIDEMENT ORTHO Right 2/19/2018    Procedure: IRRIGATION & DEBRIDEMENT ORTHO-FOOT;  Surgeon: Kirby Lopez M.D.;  Location: SURGERY [facility];  Service: Orthopedics    TOE AMPUTATION Right 1/17/2018    Procedure: TOE AMPUTATION-1ST RAY;  Surgeon: Doug Delong M.D.;  Location: SURGERY [facility];  Service: Orthopedics    TOE AMPUTATION Right 11/10/2017    Procedure: TOE AMPUTATION;  Surgeon: Osorio Leo M.D.;  Location: SURGERY [facility];  Service: Orthopedics       Social History     Socioeconomic History    Marital status:      Spouse name: Not on file    Number of children: Not on file    Years of education: Not on file    Highest education level: Not on file   Occupational History    Not on file   Tobacco Use    Smoking status: Never    Smokeless tobacco: Never   Vaping Use    Vaping Use: Never used   Substance and Sexual Activity    Alcohol use: No    Drug use: No    Sexual activity: Not on file   Other Topics Concern    Not on file   Social History Narrative    Not on file     Social Determinants of Health     Financial Resource Strain: Not on file   Food Insecurity: Not on file   Transportation Needs: Not on file   Physical Activity: Not on file   Stress: Not on file   Social Connections: Not on file   Intimate Partner Violence: Not on file   Housing Stability: Not on file       Family History   Problem Relation Age of Onset    No Known Problems Mother     No Known Problems Father        Allergies   Allergen Reactions    Zosyn [Piperacillin Sod-Tazobactam So] Rash and Itching     Redness/flushed  throughout body.     Daptomycin Rash     Body rash  RXN=1/2018      Pcn [Penicillins] Hives and Rash      Rash & hives  RXN=since a kid  Tolerates cephalosporins    Unasyn [Ampicillin-Sulbactam Sodium] Hives, Itching and Swelling     Received doses as recently as 2017 without incident. On 2/18/2018 developed a rash after receiving Unasyn.    Vancomycin Rash     Red man syndrome. Tolerates IV vancomycin at reduced infusion rate.       Current Outpatient Medications   Medication Sig Dispense Refill    metoprolol SR (TOPROL XL) 100 MG TABLET SR 24 HR Take 1 Tablet by mouth every day.      NS SOLN 100 mL with ceFAZolin 2 g SOLR 2 g Infuse 2 g into a venous catheter every 8 hours for 18 days. 54 Each 0    spironolactone (ALDACTONE) 25 MG Tab Take 1 Tablet by mouth every day. 30 Tablet 3    therapeutic multivitamin-minerals (THERAGRAN-M) Tab Take 1 Tablet by mouth every day.      lisinopril (PRINIVIL) 20 MG Tab Take 20 mg by mouth every day.      PARoxetine (PAXIL) 30 MG Tab Take 30 mg by mouth every day.      ROPINIRole (REQUIP) 0.25 MG Tab Take 0.5 mg by mouth at bedtime.      traZODone (DESYREL) 50 MG Tab Take 50 mg by mouth every evening.      acetaminophen (TYLENOL) 325 MG Tab Take 650 mg by mouth every 6 hours as needed. Indications: Pain      ziprasidone (GEODON) 40 MG Cap Take 40 mg by mouth 2 times a day.      apixaban (ELIQUIS) 5mg Tab Take 1 Tablet by mouth 2 times a day. Indications: Thromboembolism secondary to Atrial Fibrillation 180 Tablet 3    clonazePAM (KLONOPIN) 0.5 MG Tab Take  by mouth 2 times a day.      levothyroxine (SYNTHROID) 150 MCG Tab Take 1 tablet by mouth every morning on an empty stomach. 30 tablet     tamsulosin (FLOMAX) 0.4 MG capsule Take 1 capsule by mouth 1/2 hour after breakfast. 30 capsule     omeprazole (PRILOSEC) 40 MG delayed-release capsule Take 1 capsule by mouth every 12 hours. 30 capsule     vitamin D (VITAMIND D3) 1000 UNIT Tab Take 1 tablet by mouth every day. 60 tablet      magnesium oxide (MAG-OX) 400 MG Tab tablet Take 1 tablet by mouth 2 times a day.       No current facility-administered medications for this visit.       Physical Exam:  Vitals:    11/29/22 0957   BP: 124/82   BP Location: Left arm   Patient Position: Sitting   BP Cuff Size: Adult   Pulse: 79   Resp: 12   SpO2: 90%   Weight: (!) 129 kg (285 lb)   Height: 1.829 m (6')     General appearance: NAD, conversant   Eyes: anicteric sclerae, moist conjunctivae; no lid-lag; PERRLA  HENT: Atraumatic; oropharynx clear with moist mucous membranes and no mucosal ulcerations; normal hard and soft palate  Neck: Trachea midline; FROM, supple, no thyromegaly or lymphadenopathy  Lungs: CTA, with normal respiratory effort and no intercostal retractions  CV: Irregular, no MRGs, no JVD   Abdomen: Soft, non-tender; no masses or HSM  Extremities: No peripheral edema or extremity lymphadenopathy  Skin: Normal temperature, turgor and texture; no rash, ulcers or subcutaneous nodules  Psych: Appropriate affect, alert and oriented to person, place and time    Data:  Lipids:   Lab Results   Component Value Date/Time    CHOLSTRLTOT 146 02/14/2021 01:30 AM    TRIGLYCERIDE 160 (H) 02/14/2021 01:30 AM    HDL 32 (A) 02/14/2021 01:30 AM    LDL 82 02/14/2021 01:30 AM        BMP:  Lab Results   Component Value Date/Time    SODIUM 137 11/17/2022 0330    POTASSIUM 3.4 (L) 11/17/2022 0330    CHLORIDE 106 11/17/2022 0330    CO2 22 11/17/2022 0330    GLUCOSE 103 (H) 11/17/2022 0330    BUN 19 11/17/2022 0330    CREATININE 0.94 11/17/2022 0330    CALCIUM 8.4 (L) 11/17/2022 0330    ANION 9.0 11/17/2022 0330        TSH:   Lab Results   Component Value Date/Time    TSHULTRASEN 5.930 (H) 07/06/2021 0820        THYROXINE (T4):   No results found for: RADHA     CBC:   Lab Results   Component Value Date/Time    WBC 10.6 11/17/2022 03:30 AM    RBC 3.64 (L) 11/17/2022 03:30 AM    HEMOGLOBIN 11.2 (L) 11/17/2022 03:30 AM    HEMATOCRIT 33.6 (L) 11/17/2022 03:30 AM     MCV 92.3 11/17/2022 03:30 AM    MCH 30.8 11/17/2022 03:30 AM    MCHC 33.3 (L) 11/17/2022 03:30 AM    RDW 52.6 (H) 11/17/2022 03:30 AM    PLATELETCT 286 11/17/2022 03:30 AM    MPV 10.2 11/17/2022 03:30 AM    NEUTSPOLYS 76.40 (H) 11/17/2022 03:30 AM    LYMPHOCYTES 13.30 (L) 11/17/2022 03:30 AM    MONOCYTES 7.60 11/17/2022 03:30 AM    EOSINOPHILS 1.20 11/17/2022 03:30 AM    BASOPHILS 0.60 11/17/2022 03:30 AM    IMMGRAN 0.90 11/17/2022 03:30 AM    NRBC 0.00 11/17/2022 03:30 AM    NEUTS 8.09 (H) 11/17/2022 03:30 AM    LYMPHS 1.41 11/17/2022 03:30 AM    MONOS 0.81 11/17/2022 03:30 AM    EOS 0.13 11/17/2022 03:30 AM    BASO 0.06 11/17/2022 03:30 AM    IMMGRANAB 0.10 11/17/2022 03:30 AM    NRBCAB 0.00 11/17/2022 03:30 AM        CBC w/o DIFF  Lab Results   Component Value Date/Time    WBC 10.6 11/17/2022 03:30 AM    RBC 3.64 (L) 11/17/2022 03:30 AM    HEMOGLOBIN 11.2 (L) 11/17/2022 03:30 AM    MCV 92.3 11/17/2022 03:30 AM    MCH 30.8 11/17/2022 03:30 AM    MCHC 33.3 (L) 11/17/2022 03:30 AM    RDW 52.6 (H) 11/17/2022 03:30 AM    MPV 10.2 11/17/2022 03:30 AM       Prior echo/stress results reviewed: EF 30%    EKG interpreted by me: Afib    Impression/Plan:  1. Longstanding persistent atrial fibrillation (HCC)  EKG    EC-ECHOCARDIOGRAM COMPLETE W/O CONT        Permanent Afib  Hypercoagulable state due to afib  NICM tachycardiac induced    - I recommend rate control of afib. Stop amiodarone. Increase metoprolol XL to 100mg daily.  - Continue Eliquis.    He can continue to follow up with heart failure clinic as scheduled, we will discharge from EP clinic as he is not a good candidate for intervention and has failed AAD management of now permanent Afib.    Eric Lemos MD  Cardiac Electrophysiology

## 2022-12-06 ENCOUNTER — OFFICE VISIT (OUTPATIENT)
Dept: CARDIOLOGY | Facility: MEDICAL CENTER | Age: 57
End: 2022-12-06
Payer: MEDICARE

## 2022-12-06 VITALS
WEIGHT: 280 LBS | DIASTOLIC BLOOD PRESSURE: 76 MMHG | RESPIRATION RATE: 14 BRPM | SYSTOLIC BLOOD PRESSURE: 122 MMHG | HEART RATE: 76 BPM | OXYGEN SATURATION: 92 % | HEIGHT: 72 IN | BODY MASS INDEX: 37.93 KG/M2

## 2022-12-06 DIAGNOSIS — I42.8 NONISCHEMIC CARDIOMYOPATHY (HCC): ICD-10-CM

## 2022-12-06 DIAGNOSIS — I48.21 PERMANENT ATRIAL FIBRILLATION (HCC): ICD-10-CM

## 2022-12-06 DIAGNOSIS — E11.65 TYPE 2 DIABETES MELLITUS WITH HYPERGLYCEMIA, WITH LONG-TERM CURRENT USE OF INSULIN (HCC): ICD-10-CM

## 2022-12-06 DIAGNOSIS — F33.3 MAJOR DEPRESSIVE DISORDER, RECURRENT, SEVERE WITH PSYCHOTIC FEATURES (HCC): ICD-10-CM

## 2022-12-06 DIAGNOSIS — I48.0 PAROXYSMAL ATRIAL FIBRILLATION (HCC): ICD-10-CM

## 2022-12-06 DIAGNOSIS — I95.1 ORTHOSTATIC HYPOTENSION: ICD-10-CM

## 2022-12-06 DIAGNOSIS — E27.40 ADRENAL INSUFFICIENCY (HCC): ICD-10-CM

## 2022-12-06 DIAGNOSIS — Z79.4 TYPE 2 DIABETES MELLITUS WITH HYPERGLYCEMIA, WITH LONG-TERM CURRENT USE OF INSULIN (HCC): ICD-10-CM

## 2022-12-06 DIAGNOSIS — I50.32 CHRONIC HEART FAILURE WITH PRESERVED EJECTION FRACTION (HCC): ICD-10-CM

## 2022-12-06 DIAGNOSIS — E78.2 MIXED HYPERLIPIDEMIA: ICD-10-CM

## 2022-12-06 PROCEDURE — 99214 OFFICE O/P EST MOD 30 MIN: CPT | Performed by: INTERNAL MEDICINE

## 2022-12-06 RX ORDER — DAPAGLIFLOZIN 10 MG/1
10 TABLET, FILM COATED ORAL DAILY
Qty: 30 TABLET | Refills: 11 | Status: SHIPPED | OUTPATIENT
Start: 2022-12-06

## 2022-12-06 ASSESSMENT — ENCOUNTER SYMPTOMS
PALPITATIONS: 0
CARDIOVASCULAR NEGATIVE: 1
BRUISES/BLEEDS EASILY: 0
SHORTNESS OF BREATH: 0
FEVER: 0
STRIDOR: 0
HEMOPTYSIS: 0
SPUTUM PRODUCTION: 0
RESPIRATORY NEGATIVE: 1
COUGH: 0
PND: 0
GASTROINTESTINAL NEGATIVE: 1
CHILLS: 0
WEAKNESS: 0
EYES NEGATIVE: 1
CONSTITUTIONAL NEGATIVE: 1
WHEEZING: 0
NEUROLOGICAL NEGATIVE: 1
LOSS OF CONSCIOUSNESS: 0
DIZZINESS: 0
MUSCULOSKELETAL NEGATIVE: 1
ORTHOPNEA: 0
SORE THROAT: 0
CLAUDICATION: 0

## 2022-12-06 ASSESSMENT — MINNESOTA LIVING WITH HEART FAILURE QUESTIONNAIRE (MLHF)
MAKING YOU STAY IN A HOSPITAL: 4
SWELLING IN ANKLES OR LEGS: 5
MAKING YOU WORRY: 5
HAVING TO SIT OR LIE DOWN DURING THE DAY: 3
DIFFICULTY SLEEPING WELL AT NIGHT: 0
LOSS OF SELF CONTROL IN YOUR LIFE: 3
WALKING ABOUT OR CLIMBING STAIRS DIFFICULT: 5
TOTAL_SCORE: 79
DIFFICULTY WITH SEXUAL ACTIVITIES: 4
DIFFICULTY TO CONCENTRATE OR REMEMBERING THINGS: 3
DIFFICULTY SOCIALIZING WITH FAMILY OR FRIENDS: 5
GIVING YOU SIDE EFFECTS FROM TREATMENTS: 0
DIFFICULTY GOING AWAY FROM HOME: 4
FEELING LIKE A BURDEN TO FAMILY AND FRIENDS: 3
DIFFICULTY WITH RECREATIONAL PASTIMES, SPORTS, HOBBIES: 4
COSTING YOU MONEY FOR MEDICAL CARE: 4
TIRED, FATIGUED OR LOW ON ENERGY: 4
WORKING AROUND THE HOUSE OR YARD DIFFICULT: 4
MAKING YOU SHORT OF BREATH: 4
MAKING YOU FEEL DEPRESSED: 5
EATING LESS FOODS YOU LIKE: 5
DIFFICULTY WORKING TO EARN A LIVING: 5

## 2022-12-06 ASSESSMENT — FIBROSIS 4 INDEX: FIB4 SCORE: 2.24

## 2022-12-06 NOTE — PROGRESS NOTES
Chief Complaint   Patient presents with    Atrial Fibrillation     F/V Dx: Longstanding persistent atrial fibrillation (HCC)    Cardiomyopathy (Non-ischemic)    Congestive Heart Failure     F/V Dx: Acute on chronic heart failure (HCC)         Lindsay Castro is a 57 y.o. male who presents today as a new consultation for heart failure.  He is a 57-year-old male who is admitted to the hospital for gross fluid overload and possibly anasarca.  He was diuresed and sent home.  He continues to lose weight on spironolactone.  He is working with physical therapy to get his strength back.  He continues to have gross lower extremity edema and is likely 20 to 30 pounds volume overloaded.  His echocardiogram showed an EF of 25 to 30% likely from a rate related cardiomyopathy.  He is now rate controlled and doing well.    Past Medical History:   Diagnosis Date    A-fib (HCC)     Abscess 11/8/2020    Bacteremia 11/8/2020    Chest pain     Congestive heart failure ([facility])     Diabetes ([facility])     Diabetic neuropathy ([facility])     Hypercholesterolemia     Hypertension     Longstanding persistent atrial fibrillation ([facility]) 11/8/2020    LV dysfunction 11/8/2020    Morbid obesity ([facility])     NICM (nonischemic cardiomyopathy) ([facility]) 11/8/2020    Noncompliance     Normal cardiac stress test     Orthostatic hypotension     Sepsis ([facility]) 11/8/2020     Past Surgical History:   Procedure Laterality Date    DE EXPLORATORY OF ABDOMEN  5/27/2021    Procedure: LAPAROTOMY, EXPLORATORY;  Surgeon: Kin Desouza D.O.;  Location: [facility];  Service: General    COLOSTOMY TAKEDOWN  5/27/2021    Procedure: COLOSTOMY TAKEDOWN AND CLOSURE;  Surgeon: Kin Desouza D.O.;  Location: [facility];  Service: General    COLECTOMY N/A 11/10/2020    Procedure: COLECTOMY-SEGMENTAL, COLOSTOMY, MOBILIZATION OF SPLENIC FLEXURE, WOUND VAC PLACEMENT, IRRIGATION AND DEBRIDMENT FLANK WOUND;  Surgeon: Kin Desouza D.O.;  Location: [facility]  [place];  Service: General    ZZZ CARDIAC CATH  07/21/2018    EF 45%, normal coronaries.    IRRIGATION & DEBRIDEMENT ORTHO Right 2/19/2018    Procedure: IRRIGATION & DEBRIDEMENT ORTHO-FOOT;  Surgeon: Kirby Lopez M.D.;  Location: [facility];  Service: Orthopedics    TOE AMPUTATION Right 1/17/2018    Procedure: TOE AMPUTATION-1ST RAY;  Surgeon: Doug Delong M.D.;  Location: [facility];  Service: Orthopedics    TOE AMPUTATION Right 11/10/2017    Procedure: TOE AMPUTATION;  Surgeon: Osorio Leo M.D.;  Location: [facility];  Service: Orthopedics     Family History   Problem Relation Age of Onset    No Known Problems Mother     No Known Problems Father      Social History     Socioeconomic History    Marital status:      Spouse name: Not on file    Number of children: Not on file    Years of education: Not on file    Highest education level: Not on file   Occupational History    Not on file   Tobacco Use    Smoking status: Never    Smokeless tobacco: Never   Vaping Use    Vaping Use: Never used   Substance and Sexual Activity    Alcohol use: No    Drug use: No    Sexual activity: Not on file   Other Topics Concern    Not on file   Social History Narrative    Not on file     Social Determinants of Health     Financial Resource Strain: Not on file   Food Insecurity: Not on file   Transportation Needs: Not on file   Physical Activity: Not on file   Stress: Not on file   Social Connections: Not on file   Intimate Partner Violence: Not on file   Housing Stability: Not on file     Allergies   Allergen Reactions    Zosyn [Piperacillin Sod-Tazobactam So] Rash and Itching     Redness/flushed throughout body.     Daptomycin Rash     Body rash  RXN=1/2018      Pcn [Penicillins] Hives and Rash      Rash & hives  RXN=since a kid  Tolerates cephalosporins    Unasyn [Ampicillin-Sulbactam Sodium] Hives, Itching and Swelling     Received doses as recently as 2017 without incident.  On 2/18/2018 developed a rash after receiving Unasyn.    Vancomycin Rash     Red man syndrome. Tolerates IV vancomycin at reduced infusion rate.     Outpatient Encounter Medications as of 12/6/2022   Medication Sig Dispense Refill    dapagliflozin propanediol (FARXIGA) 10 MG Tab Take 1 Tablet by mouth every day. 30 Tablet 11    metoprolol SR (TOPROL XL) 100 MG TABLET SR 24 HR Take 1 Tablet by mouth every day.      spironolactone (ALDACTONE) 25 MG Tab Take 1 Tablet by mouth every day. 30 Tablet 3    therapeutic multivitamin-minerals (THERAGRAN-M) Tab Take 1 Tablet by mouth every day.      lisinopril (PRINIVIL) 20 MG Tab Take 20 mg by mouth every day.      PARoxetine (PAXIL) 30 MG Tab Take 30 mg by mouth every day.      ROPINIRole (REQUIP) 0.25 MG Tab Take 0.5 mg by mouth at bedtime.      traZODone (DESYREL) 50 MG Tab Take 50 mg by mouth every evening.      acetaminophen (TYLENOL) 325 MG Tab Take 650 mg by mouth every 6 hours as needed. Indications: Pain      ziprasidone (GEODON) 40 MG Cap Take 40 mg by mouth 2 times a day.      apixaban (ELIQUIS) 5mg Tab Take 1 Tablet by mouth 2 times a day. Indications: Thromboembolism secondary to Atrial Fibrillation 180 Tablet 3    clonazePAM (KLONOPIN) 0.5 MG Tab Take  by mouth 2 times a day.      levothyroxine (SYNTHROID) 150 MCG Tab Take 1 tablet by mouth every morning on an empty stomach. 30 tablet     tamsulosin (FLOMAX) 0.4 MG capsule Take 1 capsule by mouth 1/2 hour after breakfast. 30 capsule     omeprazole (PRILOSEC) 40 MG delayed-release capsule Take 1 capsule by mouth every 12 hours. 30 capsule     vitamin D (VITAMIND D3) 1000 UNIT Tab Take 1 tablet by mouth every day. 60 tablet     [DISCONTINUED] magnesium oxide (MAG-OX) 400 MG Tab tablet Take 1 tablet by mouth 2 times a day. (Patient not taking: Reported on 12/6/2022)       No facility-administered encounter medications on file as of 12/6/2022.     Review of Systems   Constitutional: Negative.  Negative for chills,  fever and malaise/fatigue.   HENT: Negative.  Negative for sore throat.    Eyes: Negative.    Respiratory: Negative.  Negative for cough, hemoptysis, sputum production, shortness of breath, wheezing and stridor.    Cardiovascular: Negative.  Negative for chest pain, palpitations, orthopnea, claudication, leg swelling and PND.   Gastrointestinal: Negative.    Genitourinary: Negative.    Musculoskeletal: Negative.    Skin: Negative.    Neurological: Negative.  Negative for dizziness, loss of consciousness and weakness.   Endo/Heme/Allergies: Negative.  Does not bruise/bleed easily.   All other systems reviewed and are negative.           Objective     /76 (BP Location: Left arm, Patient Position: Sitting, BP Cuff Size: Adult)   Pulse 76   Resp 14   Ht 1.829 m (6')   Wt (!) 127 kg (280 lb)   SpO2 92%   BMI 37.97 kg/m²     Physical Exam  Vitals and nursing note reviewed.   Constitutional:       General: He is not in acute distress.     Appearance: He is well-developed. He is not diaphoretic.   HENT:      Head: Normocephalic and atraumatic.      Right Ear: External ear normal.      Left Ear: External ear normal.      Nose: Nose normal.      Mouth/Throat:      Pharynx: No oropharyngeal exudate.   Eyes:      General: No scleral icterus.        Right eye: No discharge.         Left eye: No discharge.      Conjunctiva/sclera: Conjunctivae normal.      Pupils: Pupils are equal, round, and reactive to light.   Neck:      Vascular: No JVD.   Cardiovascular:      Rate and Rhythm: Normal rate and regular rhythm.      Heart sounds: No murmur heard.    No friction rub. No gallop.   Pulmonary:      Effort: Pulmonary effort is normal. No respiratory distress.      Breath sounds: No stridor. No wheezing or rales.   Chest:      Chest wall: No tenderness.   Abdominal:      General: There is no distension.      Palpations: Abdomen is soft.      Tenderness: There is no guarding.   Musculoskeletal:         General: No  tenderness or deformity. Normal range of motion.      Cervical back: Neck supple.   Skin:     General: Skin is warm and dry.      Coloration: Skin is not pale.      Findings: No erythema or rash.   Neurological:      Mental Status: He is alert.      Cranial Nerves: No cranial nerve deficit.      Motor: No abnormal muscle tone.      Coordination: Coordination normal.      Deep Tendon Reflexes: Reflexes are normal and symmetric. Reflexes normal.   Psychiatric:         Behavior: Behavior normal.         Thought Content: Thought content normal.         Judgment: Judgment normal.          Echocardiogram: Dated 11/5/2022 personally viewed and interpreted by myself showing an EF of 25% with no valvular heart disease.    EKG: Dated 11/29/2022 personally reviewed and interpreted by myself showing atrial fibrillation with controlled ventricular response.    Lab Results   Component Value Date/Time    CHOLSTRLTOT 146 02/14/2021 01:30 AM    LDL 82 02/14/2021 01:30 AM    HDL 32 (A) 02/14/2021 01:30 AM    TRIGLYCERIDE 160 (H) 02/14/2021 01:30 AM       Lab Results   Component Value Date/Time    SODIUM 137 11/17/2022 03:30 AM    POTASSIUM 3.4 (L) 11/17/2022 03:30 AM    CHLORIDE 106 11/17/2022 03:30 AM    CO2 22 11/17/2022 03:30 AM    GLUCOSE 103 (H) 11/17/2022 03:30 AM    BUN 19 11/17/2022 03:30 AM    CREATININE 0.94 11/17/2022 03:30 AM     Lab Results   Component Value Date/Time    ALKPHOSPHAT 116 (H) 11/17/2022 03:30 AM    ASTSGOT 23 11/17/2022 03:30 AM    ALTSGPT <5 11/17/2022 03:30 AM    TBILIRUBIN 1.2 11/17/2022 03:30 AM          Assessment & Plan     1. Orthostatic hypotension        2. Paroxysmal atrial fibrillation (HCC)        3. Adrenal insufficiency (HCC)        4. Major depressive disorder, recurrent, severe with psychotic features (HCC)  Referral to Pharmacotherapy Service    Comp Metabolic Panel    ALBUMIN    dapagliflozin propanediol (FARXIGA) 10 MG Tab      5. Type 2 diabetes mellitus with hyperglycemia, with  long-term current use of insulin (HCC)  Referral to Pharmacotherapy Service    Comp Metabolic Panel    ALBUMIN    dapagliflozin propanediol (FARXIGA) 10 MG Tab      6. Chronic heart failure with preserved ejection fraction (HCC)  Referral to Pharmacotherapy Service    Comp Metabolic Panel    ALBUMIN    dapagliflozin propanediol (FARXIGA) 10 MG Tab      7. Permanent atrial fibrillation (HCC)        8. Mixed hyperlipidemia        9. Nonischemic cardiomyopathy (HCC)            Medical Decision Making: Today's Assessment/Status/Plan:        57-year-old male with heart failure with reduced ejection fraction likely from a rate related cardiomyopathy with continued lower extremity edema in the setting of anasarca and low albumin.  He will stay on the spironolactone.  I will start him on Farxiga 10.  I will await his repeat echocardiogram.  We will check his labs to make sure his albumin is gone back to normal and if it is we will focus on guideline directed medical therapy.  I will see him back in 2 to 4 weeks.

## 2022-12-07 ENCOUNTER — DOCUMENTATION (OUTPATIENT)
Dept: VASCULAR LAB | Facility: MEDICAL CENTER | Age: 57
End: 2022-12-07
Payer: MEDICARE

## 2022-12-07 NOTE — PROGRESS NOTES
"[facility] Heart and Vascular Health and Pharmacotherapy Programs    Received pharmacotherapy referral for CHF  from Dr. RAY Camara on 12-6-22.    Attempted to reach patient regarding referral.  Person that answered call stated \"call back tomorrow\" and ended call.  Will attempt to reach at a later time.    Insurance: Medicare  PCP: non-[facility]  Locations to be seen: [facility]    [facility] Anticoagulation/Pharmacotherapy Clinic at [phone], fax [phone]    Doug Britton, XeniaD, BCACP      "

## 2022-12-08 NOTE — PROGRESS NOTES
Called pt back - no answer. LVM asking pt to c/b to discuss referral and schedule initial appt.     Will f/u at a later time.    Huang Ellis, XeniaD, BCACP

## 2022-12-14 LAB — EKG IMPRESSION: NORMAL

## 2022-12-15 ENCOUNTER — TELEPHONE (OUTPATIENT)
Dept: VASCULAR LAB | Facility: MEDICAL CENTER | Age: 57
End: 2022-12-15
Payer: MEDICARE

## 2022-12-15 NOTE — TELEPHONE ENCOUNTER
Called pt regarding missed pharmacotherapy appt - no answer. LVM asking pt to c/b to reschedule.     Will f/u at a later time.    Huang Ellis, XeniaD, BCACP

## 2022-12-21 NOTE — PROGRESS NOTES
"Pt c/o chest pain \"crushing\" 6/10. EKG done. EKG shown to Hospitalist. RRT team called. Hospitalist orders zofran IV x 1, tramadol po given. RRT called. Ordered 2 more ekg's 15 mins apart.   " Detail Level: Generalized spouse

## 2022-12-22 ENCOUNTER — TELEPHONE (OUTPATIENT)
Dept: VASCULAR LAB | Facility: MEDICAL CENTER | Age: 57
End: 2022-12-22
Payer: MEDICARE

## 2022-12-23 ENCOUNTER — HOSPITAL ENCOUNTER (OUTPATIENT)
Dept: CARDIOLOGY | Facility: MEDICAL CENTER | Age: 57
End: 2022-12-23
Attending: NURSE PRACTITIONER
Payer: MEDICARE

## 2022-12-23 DIAGNOSIS — I48.11 LONGSTANDING PERSISTENT ATRIAL FIBRILLATION (HCC): ICD-10-CM

## 2022-12-23 DIAGNOSIS — I42.8 NON-ISCHEMIC CARDIOMYOPATHY (HCC): ICD-10-CM

## 2022-12-23 DIAGNOSIS — E11.65 TYPE 2 DIABETES MELLITUS WITH HYPERGLYCEMIA, WITH LONG-TERM CURRENT USE OF INSULIN (HCC): ICD-10-CM

## 2022-12-23 DIAGNOSIS — I87.2 CHRONIC VENOUS INSUFFICIENCY OF LOWER EXTREMITY: ICD-10-CM

## 2022-12-23 DIAGNOSIS — I87.2 VENOUS STASIS DERMATITIS OF BOTH LOWER EXTREMITIES: ICD-10-CM

## 2022-12-23 DIAGNOSIS — I50.32 CHRONIC HEART FAILURE WITH PRESERVED EJECTION FRACTION (HCC): ICD-10-CM

## 2022-12-23 DIAGNOSIS — Z79.4 TYPE 2 DIABETES MELLITUS WITH HYPERGLYCEMIA, WITH LONG-TERM CURRENT USE OF INSULIN (HCC): ICD-10-CM

## 2022-12-23 DIAGNOSIS — R53.81 DEBILITY: ICD-10-CM

## 2022-12-23 DIAGNOSIS — E78.2 MIXED HYPERLIPIDEMIA: ICD-10-CM

## 2022-12-23 LAB
LV EJECT FRACT  99904: 60
LV EJECT FRACT MOD 2C 99903: 59.32
LV EJECT FRACT MOD 4C 99902: 57.84
LV EJECT FRACT MOD BP 99901: 58.14

## 2022-12-23 PROCEDURE — 93306 TTE W/DOPPLER COMPLETE: CPT | Mod: 26 | Performed by: INTERNAL MEDICINE

## 2022-12-23 PROCEDURE — 93306 TTE W/DOPPLER COMPLETE: CPT

## 2022-12-28 ENCOUNTER — TELEPHONE (OUTPATIENT)
Dept: CARDIOLOGY | Facility: MEDICAL CENTER | Age: 57
End: 2022-12-28
Payer: MEDICARE

## 2022-12-29 NOTE — TELEPHONE ENCOUNTER
LVM for pt in regards to lab work that was ordered at previous OV with  . Office number given for call back if pt has any questions or has had lab work done outside of [facility]. Pt has follow up appointment scheduled with   on 01/09/2022.

## 2023-01-05 ENCOUNTER — DOCUMENTATION (OUTPATIENT)
Dept: VASCULAR LAB | Facility: MEDICAL CENTER | Age: 58
End: 2023-01-05
Payer: MEDICARE

## 2023-01-05 NOTE — PROGRESS NOTES
[facility] and Pharmac[facility]     Received pharmacotherapy referral for CHF  from Dr. RAY Camara on 12-6-22.     4th call  LM with patient to c/b to establish care.     Insurance: Medicare  PCP: non-[facility]  Locations to be seen: [facility]     [facility] Anticoagulation/Pharmacotherapy [facility] at [phone], fax [phone]     Doug Britton, PharmD, BCACP

## 2023-01-09 ENCOUNTER — OFFICE VISIT (OUTPATIENT)
Dept: CARDIOLOGY | Facility: MEDICAL CENTER | Age: 58
End: 2023-01-09
Payer: MEDICARE

## 2023-01-09 VITALS
OXYGEN SATURATION: 97 % | HEIGHT: 72 IN | RESPIRATION RATE: 18 BRPM | HEART RATE: 110 BPM | DIASTOLIC BLOOD PRESSURE: 46 MMHG | SYSTOLIC BLOOD PRESSURE: 84 MMHG | WEIGHT: 289 LBS | BODY MASS INDEX: 39.14 KG/M2

## 2023-01-09 DIAGNOSIS — I50.23 ACUTE ON CHRONIC SYSTOLIC HEART FAILURE (HCC): ICD-10-CM

## 2023-01-09 DIAGNOSIS — I48.0 PAROXYSMAL ATRIAL FIBRILLATION (HCC): ICD-10-CM

## 2023-01-09 DIAGNOSIS — E27.40 ADRENAL INSUFFICIENCY (HCC): ICD-10-CM

## 2023-01-09 DIAGNOSIS — I48.21 PERMANENT ATRIAL FIBRILLATION (HCC): ICD-10-CM

## 2023-01-09 DIAGNOSIS — E11.65 TYPE 2 DIABETES MELLITUS WITH HYPERGLYCEMIA, WITH LONG-TERM CURRENT USE OF INSULIN (HCC): ICD-10-CM

## 2023-01-09 DIAGNOSIS — I42.8 NONISCHEMIC CARDIOMYOPATHY (HCC): ICD-10-CM

## 2023-01-09 DIAGNOSIS — E78.2 MIXED HYPERLIPIDEMIA: ICD-10-CM

## 2023-01-09 DIAGNOSIS — Z79.4 TYPE 2 DIABETES MELLITUS WITH HYPERGLYCEMIA, WITH LONG-TERM CURRENT USE OF INSULIN (HCC): ICD-10-CM

## 2023-01-09 DIAGNOSIS — I50.32 CHRONIC HEART FAILURE WITH PRESERVED EJECTION FRACTION (HCC): ICD-10-CM

## 2023-01-09 DIAGNOSIS — I95.1 ORTHOSTATIC HYPOTENSION: ICD-10-CM

## 2023-01-09 PROCEDURE — 99214 OFFICE O/P EST MOD 30 MIN: CPT | Performed by: INTERNAL MEDICINE

## 2023-01-09 RX ORDER — ACETAMINOPHEN 325 MG/1
650 TABLET ORAL
COMMUNITY
End: 2023-06-22

## 2023-01-09 RX ORDER — LISINOPRIL 5 MG/1
5 TABLET ORAL DAILY
Qty: 30 TABLET | Refills: 11
Start: 2023-01-09 | End: 2023-06-22

## 2023-01-09 ASSESSMENT — ENCOUNTER SYMPTOMS
GASTROINTESTINAL NEGATIVE: 1
ORTHOPNEA: 0
CARDIOVASCULAR NEGATIVE: 1
WHEEZING: 0
WEAKNESS: 0
MUSCULOSKELETAL NEGATIVE: 1
SORE THROAT: 0
CONSTITUTIONAL NEGATIVE: 1
FEVER: 0
STRIDOR: 0
DIZZINESS: 0
SHORTNESS OF BREATH: 0
PALPITATIONS: 0
EYES NEGATIVE: 1
LOSS OF CONSCIOUSNESS: 0
SPUTUM PRODUCTION: 0
CLAUDICATION: 0
CHILLS: 0
NEUROLOGICAL NEGATIVE: 1
HEMOPTYSIS: 0
COUGH: 0
PND: 0
RESPIRATORY NEGATIVE: 1
BRUISES/BLEEDS EASILY: 0

## 2023-01-09 ASSESSMENT — FIBROSIS 4 INDEX: FIB4 SCORE: 2.24

## 2023-01-09 NOTE — PROGRESS NOTES
Chief Complaint   Patient presents with    Atrial Fibrillation    Cardiomyopathy (Non-ischemic)    Hyperlipidemia     F/V Dx: Mixed hyperlipidemia        Subjective     Sebastián Castro is a 57 y.o. male who presents today as a new consultation for heart failure.      Since he was last seen his A. fib is under rate control.  We rechecked an echocardiogram that showed an EF normalized to 60%.  He is having no chest pain.  His lower extremity edema is essentially resolved.  His blood pressure is running a little bit low and he appears to be euvolemic and his lower extremities now have wrinkles on her pitting.    Past Medical History:   Diagnosis Date    A-fib (HCC)     Abscess 11/8/2020    Bacteremia 11/8/2020    Chest pain     Congestive heart failure (HCC)     Diabetes ([facility])     Diabetic neuropathy ([facility])     Hypercholesterolemia     Hypertension     Longstanding persistent atrial fibrillation ([facility]) 11/8/2020    LV dysfunction 11/8/2020    Morbid obesity ([facility])     NICM (nonischemic cardiomyopathy) ([facility]) 11/8/2020    Noncompliance     Normal cardiac stress test     Orthostatic hypotension     Sepsis ([facility]) 11/8/2020     Past Surgical History:   Procedure Laterality Date    MI EXPLORATORY OF ABDOMEN  5/27/2021    Procedure: LAPAROTOMY, EXPLORATORY;  Surgeon: Kin Desouza D.O.;  Location: [facility];  Service: General    COLOSTOMY TAKEDOWN  5/27/2021    Procedure: COLOSTOMY TAKEDOWN AND CLOSURE;  Surgeon: Kin Desouza D.O.;  Location: [facility];  Service: General    COLECTOMY N/A 11/10/2020    Procedure: COLECTOMY-SEGMENTAL, COLOSTOMY, MOBILIZATION OF SPLENIC FLEXURE, WOUND VAC PLACEMENT, IRRIGATION AND DEBRIDMENT FLANK WOUND;  Surgeon: Kin Desouza D.O.;  Location: [facility];  Service: General    ZZZ CARDIAC CATH  07/21/2018    EF 45%, normal coronaries.    IRRIGATION & DEBRIDEMENT ORTHO Right 2/19/2018    Procedure: IRRIGATION & DEBRIDEMENT ORTHO-FOOT;  Surgeon: Kirby Lopez M.D.;   Location: [facility];  Service: Orthopedics    TOE AMPUTATION Right 1/17/2018    Procedure: TOE AMPUTATION-1ST RAY;  Surgeon: Doug Delong M.D.;  Location: [facility];  Service: Orthopedics    TOE AMPUTATION Right 11/10/2017    Procedure: TOE AMPUTATION;  Surgeon: Osorio Leo M.D.;  Location: [facility];  Service: Orthopedics     Family History   Problem Relation Age of Onset    No Known Problems Mother     No Known Problems Father      Social History     Socioeconomic History    Marital status:      Spouse name: Not on file    Number of children: Not on file    Years of education: Not on file    Highest education level: Not on file   Occupational History    Not on file   Tobacco Use    Smoking status: Never    Smokeless tobacco: Never   Vaping Use    Vaping Use: Never used   Substance and Sexual Activity    Alcohol use: No    Drug use: No    Sexual activity: Not on file   Other Topics Concern    Not on file   Social History Narrative    Not on file     Social Determinants of Health     Financial Resource Strain: Not on file   Food Insecurity: Not on file   Transportation Needs: Not on file   Physical Activity: Not on file   Stress: Not on file   Social Connections: Not on file   Intimate Partner Violence: Not on file   Housing Stability: Not on file     Allergies   Allergen Reactions    Zosyn [Piperacillin Sod-Tazobactam So] Rash and Itching     Redness/flushed throughout body.     Daptomycin Rash     Body rash  RXN=1/2018      Pcn [Penicillins] Hives and Rash      Rash & hives  RXN=since a kid  Tolerates cephalosporins    Unasyn [Ampicillin-Sulbactam Sodium] Hives, Itching and Swelling     Received doses as recently as 2017 without incident. On 2/18/2018 developed a rash after receiving Unasyn.    Vancomycin Rash     Red man syndrome. Tolerates IV vancomycin at reduced infusion rate.     Outpatient Encounter Medications as of 1/9/2023   Medication Sig Dispense  Refill    lisinopril (PRINIVIL) 5 MG Tab Take 1 Tablet by mouth every day. 30 Tablet 11    dapagliflozin propanediol (FARXIGA) 10 MG Tab Take 1 Tablet by mouth every day. 30 Tablet 11    metoprolol SR (TOPROL XL) 100 MG TABLET SR 24 HR Take 1 Tablet by mouth every day.      spironolactone (ALDACTONE) 25 MG Tab Take 1 Tablet by mouth every day. 30 Tablet 3    therapeutic multivitamin-minerals (THERAGRAN-M) Tab Take 1 Tablet by mouth every day.      PARoxetine (PAXIL) 30 MG Tab Take 30 mg by mouth every day.      ROPINIRole (REQUIP) 0.25 MG Tab Take 0.5 mg by mouth at bedtime.      traZODone (DESYREL) 50 MG Tab Take 50 mg by mouth every evening.      acetaminophen (TYLENOL) 325 MG Tab Take 650 mg by mouth every 6 hours as needed. Indications: Pain      ziprasidone (GEODON) 40 MG Cap Take 40 mg by mouth 2 times a day.      apixaban (ELIQUIS) 5mg Tab Take 1 Tablet by mouth 2 times a day. Indications: Thromboembolism secondary to Atrial Fibrillation 180 Tablet 3    clonazePAM (KLONOPIN) 0.5 MG Tab Take  by mouth 2 times a day.      levothyroxine (SYNTHROID) 150 MCG Tab Take 1 tablet by mouth every morning on an empty stomach. 30 tablet     tamsulosin (FLOMAX) 0.4 MG capsule Take 1 capsule by mouth 1/2 hour after breakfast. 30 capsule     omeprazole (PRILOSEC) 40 MG delayed-release capsule Take 1 capsule by mouth every 12 hours. 30 capsule     vitamin D (VITAMIND D3) 1000 UNIT Tab Take 1 tablet by mouth every day. 60 tablet     acetaminophen (TYLENOL) 325 MG Tab Take 650 mg by mouth.      [DISCONTINUED] lisinopril (PRINIVIL) 20 MG Tab Take 20 mg by mouth every day.       No facility-administered encounter medications on file as of 1/9/2023.     Review of Systems   Constitutional: Negative.  Negative for chills, fever and malaise/fatigue.   HENT: Negative.  Negative for sore throat.    Eyes: Negative.    Respiratory: Negative.  Negative for cough, hemoptysis, sputum production, shortness of breath, wheezing and stridor.     Cardiovascular: Negative.  Negative for chest pain, palpitations, orthopnea, claudication, leg swelling and PND.   Gastrointestinal: Negative.    Genitourinary: Negative.    Musculoskeletal: Negative.    Skin: Negative.    Neurological: Negative.  Negative for dizziness, loss of consciousness and weakness.   Endo/Heme/Allergies: Negative.  Does not bruise/bleed easily.   All other systems reviewed and are negative.           Objective     BP (!) 84/46 (BP Location: Left arm, Patient Position: Sitting, BP Cuff Size: Adult)   Pulse (!) 110   Resp 18   Ht 1.829 m (6')   Wt (!) 131 kg (289 lb)   SpO2 97%   BMI 39.20 kg/m²     Physical Exam  Vitals and nursing note reviewed.   Constitutional:       General: He is not in acute distress.     Appearance: He is well-developed. He is not diaphoretic.   HENT:      Head: Normocephalic and atraumatic.      Right Ear: External ear normal.      Left Ear: External ear normal.      Nose: Nose normal.      Mouth/Throat:      Pharynx: No oropharyngeal exudate.   Eyes:      General: No scleral icterus.        Right eye: No discharge.         Left eye: No discharge.      Conjunctiva/sclera: Conjunctivae normal.      Pupils: Pupils are equal, round, and reactive to light.   Neck:      Vascular: No JVD.   Cardiovascular:      Rate and Rhythm: Normal rate and regular rhythm.      Heart sounds: No murmur heard.    No friction rub. No gallop.   Pulmonary:      Effort: Pulmonary effort is normal. No respiratory distress.      Breath sounds: No stridor. No wheezing or rales.   Chest:      Chest wall: No tenderness.   Abdominal:      General: There is no distension.      Palpations: Abdomen is soft.      Tenderness: There is no guarding.   Musculoskeletal:         General: No tenderness or deformity. Normal range of motion.      Cervical back: Neck supple.   Skin:     General: Skin is warm and dry.      Coloration: Skin is not pale.      Findings: No erythema or rash.   Neurological:       Mental Status: He is alert.      Cranial Nerves: No cranial nerve deficit.      Motor: No abnormal muscle tone.      Coordination: Coordination normal.      Deep Tendon Reflexes: Reflexes are normal and symmetric. Reflexes normal.   Psychiatric:         Behavior: Behavior normal.         Thought Content: Thought content normal.         Judgment: Judgment normal.          Echocardiogram: Dated 11/5/2022 personally viewed and interpreted by myself showing an EF of 25% with no valvular heart disease.    EKG: Dated 11/29/2022 personally reviewed and interpreted by myself showing atrial fibrillation with controlled ventricular response.    Lab Results   Component Value Date/Time    CHOLSTRLTOT 146 02/14/2021 01:30 AM    LDL 82 02/14/2021 01:30 AM    HDL 32 (A) 02/14/2021 01:30 AM    TRIGLYCERIDE 160 (H) 02/14/2021 01:30 AM       Lab Results   Component Value Date/Time    SODIUM 137 11/17/2022 03:30 AM    POTASSIUM 3.4 (L) 11/17/2022 03:30 AM    CHLORIDE 106 11/17/2022 03:30 AM    CO2 22 11/17/2022 03:30 AM    GLUCOSE 103 (H) 11/17/2022 03:30 AM    BUN 19 11/17/2022 03:30 AM    CREATININE 0.94 11/17/2022 03:30 AM     Lab Results   Component Value Date/Time    ALKPHOSPHAT 116 (H) 11/17/2022 03:30 AM    ASTSGOT 23 11/17/2022 03:30 AM    ALTSGPT <5 11/17/2022 03:30 AM    TBILIRUBIN 1.2 11/17/2022 03:30 AM          Assessment & Plan     1. Paroxysmal atrial fibrillation (HCC)  lisinopril (PRINIVIL) 5 MG Tab      2. Orthostatic hypotension        3. Adrenal insufficiency (HCC)        4. Type 2 diabetes mellitus with hyperglycemia, with long-term current use of insulin (HCC)  lisinopril (PRINIVIL) 5 MG Tab      5. Permanent atrial fibrillation (HCC)  lisinopril (PRINIVIL) 5 MG Tab      6. Chronic heart failure with preserved ejection fraction (HCC)  lisinopril (PRINIVIL) 5 MG Tab      7. Acute on chronic systolic heart failure (HCC)        8. Nonischemic cardiomyopathy (HCC)        9. Mixed hyperlipidemia            Medical  Decision Making: Today's Assessment/Status/Plan:        57-year-old male with heart failure with reduced ejection fraction likely from a rate related cardiomyopathy with continued lower extremity edema in the setting of anasarca and low albumin.  Given that he appears to be back to normal we will cut down his lisinopril to 5 mg to allow his blood pressure to come back up.  He will continue to need metoprolol for rate control.  We will see him back in 3 months with labs prior.

## 2023-01-12 ENCOUNTER — TELEPHONE (OUTPATIENT)
Dept: VASCULAR LAB | Facility: MEDICAL CENTER | Age: 58
End: 2023-01-12
Payer: MEDICARE

## 2023-01-12 NOTE — TELEPHONE ENCOUNTER
Called pt regarding missed initial pharmacotherapy appt - no answer. LVM asking pt to c/b to reschedule.      5th call.    Will f/u at a later time.     Huang Ellis, XeniaD, BCACP

## 2023-01-26 NOTE — ASSESSMENT & PLAN NOTE
Continues, added Cristinaf continue to monitor   Staged Advancement Flap Text: The defect edges were debeveled with a #15 scalpel blade.  Given the location of the defect, shape of the defect and the proximity to free margins a staged advancement flap was deemed most appropriate.  Using a sterile surgical marker, an appropriate advancement flap was drawn incorporating the defect and placing the expected incisions within the relaxed skin tension lines where possible. The area thus outlined was incised deep to adipose tissue with a #15 scalpel blade.  The skin margins were undermined to an appropriate distance in all directions utilizing iris scissors.

## 2023-01-27 ENCOUNTER — TELEPHONE (OUTPATIENT)
Dept: VASCULAR LAB | Facility: MEDICAL CENTER | Age: 58
End: 2023-01-27
Payer: MEDICARE

## 2023-01-27 NOTE — LETTER
[address] [facility] [address]        Dear Sebastián Castro ,    We have been unsuccessful in our attempts to contact you regarding your Clinical Pharmacist referral.  Please contact us if you would like to schedule an appointment for help managing your medications.    Please contact our clinic so we may assist you.  We are open Monday-Friday 8 am until 5 pm.  You may reach our Service at [phone].        Sincerely,    Simone Dixon PharmD, [facility]  Clinic Supervisor  [facility]  Outpatient Anticoagulation Service

## 2023-01-27 NOTE — TELEPHONE ENCOUNTER
Called pt regarding missed initial pharmacotherapy appt - no answer. LVM asking pt to c/b to reschedule.      6th call.    Sent letter.     Will f/u at a later time.     Huang Ellis, XeniaD, BCACP

## 2023-02-10 ENCOUNTER — TELEPHONE (OUTPATIENT)
Dept: VASCULAR LAB | Facility: MEDICAL CENTER | Age: 58
End: 2023-02-10
Payer: MEDICARE

## 2023-02-10 NOTE — LETTER
[address] [facility] [address]        Dear Sebastián Castro ,    We have been unsuccessful in our attempts to contact you regarding your Clinical Pharmacist referral.  Please contact us if you would like to schedule an appointment for help managing your medications.    Please contact our clinic so we may assist you.  We are open Monday-Friday 8 am until 5 pm.  You may reach our Service at [phone].        Sincerely,    Simone Dixon PharmD, [facility]  Clinic Supervisor  [facility]  Outpatient Anticoagulation Service

## 2023-02-10 NOTE — TELEPHONE ENCOUNTER
Called pt regarding missed initial pharmacotherapy appt - no answer. LVM asking pt to c/b to reschedule.      7th call.     Sent letter x 2.     Given we have been unable to reach the pt after multiple attempts will await pt contact moving forward.     Huang Ellis, PharmD, BCACP    CC: Dr. Camara

## 2023-03-16 NOTE — DISCHARGE PLANNING
Notified by bedside nurse that pt will need transport. SW met with pt at bedside to discuss transport. LUNA faxed transport form to [facility] for [facility].    (2) good, crying

## 2023-04-05 NOTE — ED NOTES
ERP at bedside.    Head, normocephalic, atraumatic, External nose normal appearance, , Head, normocephalic, atraumatic, External nose normal appearance,

## 2023-06-16 ENCOUNTER — TELEPHONE (OUTPATIENT)
Dept: CARDIOLOGY | Facility: MEDICAL CENTER | Age: 58
End: 2023-06-16
Payer: MEDICARE

## 2023-06-16 NOTE — TELEPHONE ENCOUNTER
Called pt in regards to lab work that was ordered at previous OV. LVM for call back to see if the labs were completed somewhere outside of [facility]. Pt has follow up appointment scheduled with AM on 06/22/23.

## 2023-06-19 NOTE — PROGRESS NOTES
FW walker was delivered and fitted to patient.  If any further assistance needed, please call extension [phone] or place order for Ortho Technician assistance as a communication order in [employer].      Left arm;

## 2023-06-22 ENCOUNTER — OFFICE VISIT (OUTPATIENT)
Dept: CARDIOLOGY | Facility: MEDICAL CENTER | Age: 58
End: 2023-06-22
Attending: PHYSICIAN ASSISTANT
Payer: MEDICARE

## 2023-06-22 VITALS
BODY MASS INDEX: 39.2 KG/M2 | HEIGHT: 72 IN | DIASTOLIC BLOOD PRESSURE: 60 MMHG | SYSTOLIC BLOOD PRESSURE: 124 MMHG | OXYGEN SATURATION: 97 % | RESPIRATION RATE: 12 BRPM | HEART RATE: 85 BPM

## 2023-06-22 DIAGNOSIS — E11.65 TYPE 2 DIABETES MELLITUS WITH HYPERGLYCEMIA, UNSPECIFIED WHETHER LONG TERM INSULIN USE (HCC): ICD-10-CM

## 2023-06-22 DIAGNOSIS — E78.2 MIXED HYPERLIPIDEMIA: ICD-10-CM

## 2023-06-22 DIAGNOSIS — Z79.899 HIGH RISK MEDICATION USE: ICD-10-CM

## 2023-06-22 DIAGNOSIS — I42.8 NON-ISCHEMIC CARDIOMYOPATHY (HCC): ICD-10-CM

## 2023-06-22 DIAGNOSIS — I42.8 NONISCHEMIC CARDIOMYOPATHY (HCC): ICD-10-CM

## 2023-06-22 DIAGNOSIS — I48.11 LONGSTANDING PERSISTENT ATRIAL FIBRILLATION (HCC): ICD-10-CM

## 2023-06-22 DIAGNOSIS — I87.2 CHRONIC VENOUS INSUFFICIENCY OF LOWER EXTREMITY: ICD-10-CM

## 2023-06-22 PROCEDURE — 3078F DIAST BP <80 MM HG: CPT | Performed by: PHYSICIAN ASSISTANT

## 2023-06-22 PROCEDURE — 3074F SYST BP LT 130 MM HG: CPT | Performed by: PHYSICIAN ASSISTANT

## 2023-06-22 PROCEDURE — 99214 OFFICE O/P EST MOD 30 MIN: CPT | Performed by: PHYSICIAN ASSISTANT

## 2023-06-22 PROCEDURE — 99213 OFFICE O/P EST LOW 20 MIN: CPT | Performed by: PHYSICIAN ASSISTANT

## 2023-06-22 RX ORDER — LOPERAMIDE HYDROCHLORIDE 2 MG/1
2 CAPSULE ORAL 4 TIMES DAILY PRN
COMMUNITY

## 2023-06-22 RX ORDER — FUROSEMIDE 20 MG/1
20 TABLET ORAL PRN
Qty: 30 TABLET | Refills: 3 | Status: SHIPPED | OUTPATIENT
Start: 2023-06-22

## 2023-06-22 NOTE — PROGRESS NOTES
Chief Complaint   Patient presents with    Atrial Fibrillation     FV Dx: Paroxysmal atrial fibrillation    Follow-Up     FV Dx: Orthostatic hypotension       Subjective     Sebastián Castro is a 58 y.o. male with a history of persistent A-fib, nonischemic cardiomyopathy, diabetes mellitus, hyperlipidemia, chronic venous insufficiency who presents today for follow-up.    His primary cardiologist is Dr. Larson.  He was previously seen by Dr. Camara and Dr. Lemos as well.  Last seen 1/9/2023 by Dr. Camara.  At that exam, he was doing well. His afib rate was under control. His lower extremity edema had resolved and he was euvolemic. His blood pressure was a little low, but was not causing symptoms. His EF was back to normal and his lisinopril was reduced to allow for an increase in BP. He was to follow up in 3 months.    Today, he reports that he continues to do well. He does not have any current cardiac complaints. He is on 2L of supplemental O2 chronically. No chest pain or palpitations. No shortness of breath, dyspnea on exertion, orthopnea or PND. No lower extremity edema. No dizziness or lightheadedness. No syncope or presyncope.      He is currently at [facility].    Past Medical History:   Diagnosis Date    A-fib (HCC)     Abscess 11/8/2020    Bacteremia 11/8/2020    Chest pain     Congestive heart failure (HCC)     Diabetes ([facility])     Diabetic neuropathy ([facility])     Hypercholesterolemia     Hypertension     Longstanding persistent atrial fibrillation ([facility]) 11/8/2020    LV dysfunction 11/8/2020    Morbid obesity ([facility])     NICM (nonischemic cardiomyopathy) ([facility]) 11/8/2020    Noncompliance     Normal cardiac stress test     Orthostatic hypotension     Sepsis ([facility]) 11/8/2020     Past Surgical History:   Procedure Laterality Date    AK EXPLORATORY OF ABDOMEN  5/27/2021    Procedure: LAPAROTOMY, EXPLORATORY;  Surgeon: Kin Desouza D.O.;  Location: SURGERY [facility];  Service: General    COLOSTOMY TAKEDOWN  5/27/2021     Procedure: COLOSTOMY TAKEDOWN AND CLOSURE;  Surgeon: Kin Desouza D.O.;  Location: SURGERY [facility];  Service: General    COLECTOMY N/A 11/10/2020    Procedure: COLECTOMY-SEGMENTAL, COLOSTOMY, MOBILIZATION OF SPLENIC FLEXURE, WOUND VAC PLACEMENT, IRRIGATION AND DEBRIDMENT FLANK WOUND;  Surgeon: Kin Desouza D.O.;  Location: SURGERY [facility];  Service: General    ZZZ CARDIAC CATH  07/21/2018    EF 45%, normal coronaries.    IRRIGATION & DEBRIDEMENT ORTHO Right 2/19/2018    Procedure: IRRIGATION & DEBRIDEMENT ORTHO-FOOT;  Surgeon: Kirby Lopez M.D.;  Location: SURGERY [facility];  Service: Orthopedics    TOE AMPUTATION Right 1/17/2018    Procedure: TOE AMPUTATION-1ST RAY;  Surgeon: Doug Delong M.D.;  Location: SURGERY [facility];  Service: Orthopedics    TOE AMPUTATION Right 11/10/2017    Procedure: TOE AMPUTATION;  Surgeon: Osorio Leo M.D.;  Location: SURGERY [facility];  Service: Orthopedics     Family History   Problem Relation Age of Onset    No Known Problems Mother     No Known Problems Father      Social History     Socioeconomic History    Marital status:      Spouse name: Not on file    Number of children: Not on file    Years of education: Not on file    Highest education level: Not on file   Occupational History    Not on file   Tobacco Use    Smoking status: Never    Smokeless tobacco: Never   Vaping Use    Vaping Use: Never used   Substance and Sexual Activity    Alcohol use: No    Drug use: No    Sexual activity: Not on file   Other Topics Concern    Not on file   Social History Narrative    Not on file     Social Determinants of Health     Financial Resource Strain: Low Risk  (10/21/2019)    Overall Financial Resource Strain (CARDIA)     Difficulty of Paying Living Expenses: Not hard at all   Food Insecurity: No Food Insecurity (10/21/2019)    Hunger Vital Sign     Worried About Running Out of Food in the Last Year: Never true     Ran Out of Food in the Last  Year: Never true   Transportation Needs: No Transportation Needs (10/21/2019)    PRAPARE - Transportation     Lack of Transportation (Medical): No     Lack of Transportation (Non-Medical): No   Physical Activity: Not on file   Stress: Not on file   Social Connections: Not on file   Intimate Partner Violence: Not on file   Housing Stability: Not on file     Allergies   Allergen Reactions    Zosyn [Piperacillin Sod-Tazobactam So] Rash and Itching     Redness/flushed throughout body.     Daptomycin Rash     Body rash  RXN=1/2018      Pcn [Penicillins] Hives and Rash      Rash & hives  RXN=since a kid  Tolerates cephalosporins    Unasyn [Ampicillin-Sulbactam Sodium] Hives, Itching and Swelling     Received doses as recently as 2017 without incident. On 2/18/2018 developed a rash after receiving Unasyn.    Vancomycin Rash     Red man syndrome. Tolerates IV vancomycin at reduced infusion rate.     Outpatient Encounter Medications as of 6/22/2023   Medication Sig Dispense Refill    loperamide (IMODIUM) 2 MG Cap Take 2 mg by mouth 4 times a day as needed for Diarrhea.      Magnesium Oxide (MAG-OX PO) Take  by mouth.      furosemide (LASIX) 20 MG Tab Take 1 Tablet by mouth as needed (Take 1 as needed for any wieght gain of 3lbs on a day or 5lbs in a week or any increased lower leg swelling or shortness of breath.). 30 Tablet 3    dapagliflozin propanediol (FARXIGA) 10 MG Tab Take 1 Tablet by mouth every day. 30 Tablet 11    metoprolol SR (TOPROL XL) 100 MG TABLET SR 24 HR Take 1 Tablet by mouth every day.      spironolactone (ALDACTONE) 25 MG Tab Take 1 Tablet by mouth every day. 30 Tablet 3    therapeutic multivitamin-minerals (THERAGRAN-M) Tab Take 1 Tablet by mouth every day.      PARoxetine (PAXIL) 30 MG Tab Take 30 mg by mouth every day.      ROPINIRole (REQUIP) 0.25 MG Tab Take 0.5 mg by mouth at bedtime.      traZODone (DESYREL) 50 MG Tab Take 50 mg by mouth every evening.      ziprasidone (GEODON) 40 MG Cap Take 40  mg by mouth 2 times a day.      apixaban (ELIQUIS) 5mg Tab Take 1 Tablet by mouth 2 times a day. Indications: Thromboembolism secondary to Atrial Fibrillation 180 Tablet 3    clonazePAM (KLONOPIN) 0.5 MG Tab Take  by mouth 2 times a day.      levothyroxine (SYNTHROID) 150 MCG Tab Take 1 tablet by mouth every morning on an empty stomach. 30 tablet     tamsulosin (FLOMAX) 0.4 MG capsule Take 1 capsule by mouth 1/2 hour after breakfast. 30 capsule     omeprazole (PRILOSEC) 40 MG delayed-release capsule Take 1 capsule by mouth every 12 hours. 30 capsule     vitamin D (VITAMIND D3) 1000 UNIT Tab Take 1 tablet by mouth every day. 60 tablet     [DISCONTINUED] acetaminophen (TYLENOL) 325 MG Tab Take 650 mg by mouth. (Patient not taking: Reported on 6/22/2023)      [DISCONTINUED] lisinopril (PRINIVIL) 5 MG Tab Take 1 Tablet by mouth every day. (Patient not taking: Reported on 6/22/2023) 30 Tablet 11    [DISCONTINUED] acetaminophen (TYLENOL) 325 MG Tab Take 650 mg by mouth every 6 hours as needed. Indications: Pain       No facility-administered encounter medications on file as of 6/22/2023.     Review of Systems   Constitutional: Negative.  Negative for chills, fever and malaise/fatigue.   HENT: Negative.     Eyes: Negative.  Negative for blurred vision and double vision.   Respiratory:  Negative for cough and shortness of breath.    Cardiovascular:  Negative for chest pain, palpitations, orthopnea, claudication, leg swelling and PND.   Gastrointestinal: Negative.  Negative for abdominal pain, nausea and vomiting.   Genitourinary: Negative.    Musculoskeletal: Negative.  Negative for myalgias.   Skin: Negative.  Negative for rash.   Neurological: Negative.  Negative for dizziness, loss of consciousness, weakness and headaches.   Endo/Heme/Allergies: Negative.  Does not bruise/bleed easily.   Psychiatric/Behavioral: Negative.                Objective     /60 (BP Location: Left arm, Patient Position: Sitting, BP Cuff  Size: Large adult)   Pulse 85   Resp 12   Ht 1.829 m (6')   SpO2 97%   PF (!) 2 L/min   BMI 39.20 kg/m²     Physical Exam  Vitals reviewed.   Constitutional:       General: He is not in acute distress.     Appearance: Normal appearance.   HENT:      Head: Normocephalic and atraumatic.      Right Ear: External ear normal.      Left Ear: External ear normal.   Eyes:      General: No scleral icterus.     Extraocular Movements: Extraocular movements intact.      Conjunctiva/sclera: Conjunctivae normal.      Pupils: Pupils are equal, round, and reactive to light.   Cardiovascular:      Rate and Rhythm: Normal rate. Rhythm irregular.      Pulses: Normal pulses.      Heart sounds: Normal heart sounds. No murmur heard.     No friction rub. No gallop.   Pulmonary:      Effort: Pulmonary effort is normal.      Breath sounds: Normal breath sounds.   Abdominal:      General: Bowel sounds are normal.      Palpations: Abdomen is soft.      Tenderness: There is no abdominal tenderness.   Musculoskeletal:         General: Normal range of motion.      Cervical back: Normal range of motion and neck supple.      Right lower leg: Edema (1+) present.      Left lower leg: Edema (1+) present.   Skin:     General: Skin is warm and dry.      Capillary Refill: Capillary refill takes less than 2 seconds.   Neurological:      General: No focal deficit present.      Mental Status: He is alert and oriented to person, place, and time.   Psychiatric:         Mood and Affect: Mood normal.         Behavior: Behavior normal.         Judgment: Judgment normal.       Lab Results   Component Value Date/Time    CHOLSTRLTOT 146 02/14/2021 01:30 AM    LDL 82 02/14/2021 01:30 AM    HDL 32 (A) 02/14/2021 01:30 AM    TRIGLYCERIDE 160 (H) 02/14/2021 01:30 AM       Lab Results   Component Value Date/Time    SODIUM 137 11/17/2022 03:30 AM    POTASSIUM 3.4 (L) 11/17/2022 03:30 AM    CHLORIDE 106 11/17/2022 03:30 AM    CO2 22 11/17/2022 03:30 AM    GLUCOSE  103 (H) 11/17/2022 03:30 AM    BUN 19 11/17/2022 03:30 AM    CREATININE 0.94 11/17/2022 03:30 AM     Lab Results   Component Value Date/Time    ALKPHOSPHAT 116 (H) 11/17/2022 03:30 AM    ASTSGOT 23 11/17/2022 03:30 AM    ALTSGPT <5 11/17/2022 03:30 AM    TBILIRUBIN 1.2 11/17/2022 03:30 AM       Cardiovascular imaging and procedures:    Echocardiogram 11/5/2022  CONCLUSIONS  Severely reduced left ventricular systolic function.  The left ventricular ejection fraction is visually estimated to be 25-  30%, but may be underestimated due to tachycardia.  Global hypokinesis with more pronounced hypokinesis of the anterior and   anterolateral walls.  Diastolic function is abnormal, but grade cannot be determined.  The right ventricle is not well visualized, but appears dilated with   reduced systolic function.  Biatrial dilation.  Mild to moderate tricuspid regurgitation.  Dilated inferior vena cava without inspiratory collapse    Echocardiogram 12/23/2022  CONCLUSIONS  The left ventricular ejection fraction is visually estimated to be 60%.  Right ventricular systolic pressure is estimated to be 31 mmHg.  Left atrial volume index is 60 mL/sq m.  Moderate aortic insufficiency.            Assessment & Plan     1. Longstanding persistent atrial fibrillation (HCC)        2. Nonischemic cardiomyopathy (HCC)  furosemide (LASIX) 20 MG Tab    Comp Metabolic Panel      3. Mixed hyperlipidemia        4. Non-ischemic cardiomyopathy (HCC)        5. Chronic venous insufficiency of lower extremity        6. High risk medication use        7. Type 2 diabetes mellitus with hyperglycemia, unspecified whether long term insulin use (HCC)            Medical Decision Making: Today's Assessment/Status/Plan:        Persistent A-fib  - Asymptomatic  - Rate is well controlled  - Continue eliquis 5mg BID for CVA prevention  - Continue metoprolol 100mg daily  - Followed by EP    Nonischemic cardiomyopathy  Hyperlipidemia  - EF is recovered to 60%  -  He completely discontinued lisinopril and his blood pressure have improved.   - He is slightly volume up on exam and reports a 20lb weight gain. We will start lasix 20mg PRN.   - Repeat CMP  - Continue metoprolol 100mg daily  - [facility] will [facility]. If no lipid panel on file, we will repeat.     Chronic Venous Insuffiencey  - Some lower extremity on exam.  - Encouraged low salt diet and leg elevation.  - Recommended compression stocking use.  - We will trial some PRN lasix per above.     Diabetes mellitus  - Well controlled  - Last A1C 6.0  - Continue farxiga    Follow up in 3 months.    Encouraged him to reach out via popADhart or telephone with any questions or concerns.    Thank you for allowing me to participate in the care of Sebastián Castro .    Zenia Stratton PA-C, Cardiology  [facility], [address]  [address] [address]  Phone: [phone]  Fax: [phone]    PLEASE NOTE: This note was created using voice recognition software. I have made every reasonable attempt to correct obvious errors, but I expect that there are errors of grammar and possibly content that I did not discover before finalizing the note.

## 2023-07-26 NOTE — CARE PLAN
Problem: Communication  Goal: The ability to communicate needs accurately and effectively will improve  Outcome: PROGRESSING AS EXPECTED  Reoriented pt to environment as needed; white board updated with [facility] staff and return time. Pt notified of hourly rounding and unit routine. Appropriate signs in place at doorway for pt. Pt allowed time to ask questions; no questions at this time.    Problem: Infection  Goal: Will remain free from infection  Outcome: PROGRESSING AS EXPECTED  Standard precautions in place. Performed hand washing before and after pt contact. Educated pt on standard precautions to help prevent spread of infection/germs. PICC in place for long term ABX; ABX given per MAR. WBC of 7.0 on 11/26.       Rifampin Pregnancy And Lactation Text: This medication is Pregnancy Category C and it isn't know if it is safe during pregnancy. It is also excreted in breast milk and should not be used if you are breast feeding.

## 2023-08-05 ASSESSMENT — ENCOUNTER SYMPTOMS
MUSCULOSKELETAL NEGATIVE: 1
BRUISES/BLEEDS EASILY: 0
ABDOMINAL PAIN: 0
EYES NEGATIVE: 1
CONSTITUTIONAL NEGATIVE: 1
NEUROLOGICAL NEGATIVE: 1
GASTROINTESTINAL NEGATIVE: 1
NAUSEA: 0
SHORTNESS OF BREATH: 0
COUGH: 0
MYALGIAS: 0
DOUBLE VISION: 0
FEVER: 0
PSYCHIATRIC NEGATIVE: 1
VOMITING: 0
WEAKNESS: 0
ORTHOPNEA: 0
PALPITATIONS: 0
HEADACHES: 0
BLURRED VISION: 0
PND: 0
LOSS OF CONSCIOUSNESS: 0
DIZZINESS: 0
CHILLS: 0
CLAUDICATION: 0

## 2023-08-14 NOTE — DISCHARGE PLANNING
Received Stipulation and Order from the court continuing pt until 2/28. Sent copy to unit LSW.     Improved

## 2023-08-25 NOTE — PROGRESS NOTES
Dear Aristeo Cho [facility] Hospitalist Progress Note    Date of Service: 2/24/2018    Chief Complaint  53 y.o. male with history of right great toe amputation in November his wound cultures positive for MRSA and clinical course, was noncompliant with oral Zyvox, admitted 2/17/2018 with worsening of diabetes for wound on the right side, right foot cellulitis    Interval Problem Update  2/20 patient is stable overnight, no significant chief complaint.  Patient otherwise denies fever, chills, nausea, vomiting, adb pain, SOB, CP, headache, constipation, diarrhea, cough, or sputum.  Patient tolerated antibiotics yesterday. Start patient on Cipro and linezolid and continue with the Bactrim.    2/21 patient is stable and no significant chief complaint overnight. Denies fever, chills, nausea vomiting diarrhea. Patient currently pending PT OT. Still on antibiotics for MRSA.  2/22 patient stable however surgical wound does not look screen. Recommended to stay in the hospital. LPS and infectious disease specialist. Continue oral antibiotics.  2/23 patient is stable in chair in no significant overnight events. Surgical wound still looks infected. Recommend keeping the hospital for wound care.  2/24 Patient otherwise denies fever, chills, nausea, vomiting, adb pain, SOB, CP, headache, constipation, diarrhea, cough, or sputum.  ID on case and still recom in hospital monitor for wound.    Consultants/Specialty  Infectious disease  Orthopedics  Limb preservation  Disposition  Inpatient        Review of Systems   Constitutional: Negative.  Negative for chills and weight loss.   HENT: Negative.  Negative for ear pain and tinnitus.    Eyes: Negative.  Negative for blurred vision, pain and discharge.   Respiratory: Negative.  Negative for sputum production and shortness of breath.    Cardiovascular: Positive for claudication. Negative for chest pain and palpitations.   Gastrointestinal: Negative.  Negative for diarrhea, heartburn and vomiting.    Genitourinary: Negative.  Negative for dysuria and frequency.   Musculoskeletal: Positive for joint pain.        Swelling, erythema, pain, wants the right foot   Skin: Negative.  Negative for rash.   Neurological: Negative.    Endo/Heme/Allergies: Negative.    Psychiatric/Behavioral: Negative.    All other systems reviewed and are negative.     Physical Exam  Laboratory/Imaging   Hemodynamics  Temp (24hrs), Av.7 °C (98 °F), Min:36.1 °C (97 °F), Max:37.5 °C (99.5 °F)   Temperature: 36.5 °C (97.7 °F)  Pulse  Av.1  Min: 50  Max: 99    Blood Pressure: (!) 173/87      Respiratory      Respiration: 16, Pulse Oximetry: 98 %        RUL Breath Sounds: Diminished, RML Breath Sounds: Diminished, RLL Breath Sounds: Diminished, ELIAS Breath Sounds: Diminished, LLL Breath Sounds: Diminished    Fluids    Intake/Output Summary (Last 24 hours) at 18 1325  Last data filed at 18 1000   Gross per 24 hour   Intake              800 ml   Output                0 ml   Net              800 ml       Nutrition  Orders Placed This Encounter   Procedures   • DIET ORDER     Standing Status:   Standing     Number of Occurrences:   1     Order Specific Question:   Diet:     Answer:   Diabetic [3]     Physical Exam   Constitutional: He is oriented to person, place, and time. He appears well-developed and well-nourished. No distress.   HENT:   Right Ear: External ear normal.   Left Ear: External ear normal.   Eyes: EOM are normal. Pupils are equal, round, and reactive to light. Left eye exhibits no discharge.   Neck: Normal range of motion. Neck supple. No JVD present. No thyromegaly present.   Cardiovascular: Normal rate and regular rhythm.  Exam reveals no gallop and no friction rub.    No murmur heard.  Pulmonary/Chest: Effort normal and breath sounds normal. He has no wheezes. He has no rales. He exhibits no tenderness.   Abdominal: Soft. Bowel sounds are normal. He exhibits no distension. There is no tenderness. There is no  facility-administered medications for this visit.       ALLERGIES:   Allergen Reactions   • Diazepam Other (See Comments)     Patient's mom states that at home they gave valium and shortly after mom reports swelling. Mom unsure if this was the cause.       ROS:  Review of 10 plus systems was performed.   Otherwise negative except as stated in HPI/PMHx.     Physical Exam  There were no vitals taken for this visit.  No weight on file for this encounter.  No height on file for this encounter.  No head circumference on file for this encounter.  No height and weight on file for this encounter.    General:  Healthy, alert, in no distress  HEENT:  Head normocephalic, atraumatic; Eyes: sclera anicteric, conjunctiva clear.   Nares: Patent, no discharge.  Oropharynx:  Moist mucous membranes, no lesions   Neck:    Supple, No LAD  Heart:   Regular rate and rhythm and no murmurs   Lungs:  Regular work of breathing, clear to auscultation bilaterally.  Abdomen:   Soft, nontender, non-distended, no masses or hepatosplenomegaly g tube 20 Fr 3.0 CM   Extremities:  No deformities, no edema, good muscle bulk  Skin: No rashes, no jaundice  Labs:  0    Radiology and Procedure Results:  0      I spent 30 minutes on the day of service reviewing the records, completing the face to face visit, coordinating care, and completing the documentation.        guarding.   Musculoskeletal: Normal range of motion. He exhibits no edema or deformity.   Patient is surgical wound is still not clean   Neurological: He is alert and oriented to person, place, and time.   Skin: Skin is warm and dry. He is not diaphoretic.   Psychiatric: He has a normal mood and affect. His behavior is normal.   Nursing note and vitals reviewed.                               Assessment/Plan     * Diabetic foot infection (CMS-HCC)- (present on admission)   Assessment & Plan    Osteomyelitis and multiple right foot abscess , status post irrigation and debridement on February 19  -Staph aureus growing. +GPC  - Infectious disease consulted  -cont with cipro, linezolid,  Bactrim  - Tolerated antibiotics  - need HH and PT OT at home, already arranged  -Patient's surgical wounds are still showing signs of the erythema, hold discharge for signs of surgical wound infection for wound care  - ID on the case        Type 2 diabetes mellitus (CMS-HCC)- (present on admission)   Assessment & Plan    Holding metformin  insulin sliding scale. Hypoglycemia protocol        HTN (hypertension)- (present on admission)   Assessment & Plan    Continue Norvasc and Prinivil   Monitor Blood pressure        Atrial fibrillation (CMS-HCC)- (present on admission)   Assessment & Plan    Continue digoxin and metoprolol. Heart rate is controlled  On admission xarelto was felt and started IV heparin for surgery  After surgery, started on subcu Lovenox  Follow up with surgery to see if they  plan any more procedure, and if not, restart Xarelto        HLD (hyperlipidemia)- (present on admission)   Assessment & Plan     continue home Pravachol .          Quality-Core Measures   Reviewed items::  Labs reviewed, Radiology images reviewed and Medications reviewed  Zabala catheter::  No Zabala  DVT prophylaxis pharmacological::  Enoxaparin (Lovenox)  DVT prophylaxis - mechanical:  SCDs  Ulcer Prophylaxis::  Not indicated  Antibiotics:   Treating active infection/contamination beyond 24 hours perioperative coverage   For complexity-based billing, please refer to the history, exam, and decison making above. In addition, I spent >35 minutes caring for the patient today. More than 50% of the time was spent counseling and coordinating care.    I have discussed with RN and BRANDON and SW and other consultants about patient's plan.

## 2023-09-12 NOTE — PROGRESS NOTES
Assumed care of pt. Bedside report received from night RN Tiffanie. Pt was updated on plan of care. Bed locked in lowest position, 2 side rails up, bed alarm on and working appropriately. 1:1 sitter at bedside.      LVM for pt to call back to schedule.

## 2023-09-15 ENCOUNTER — TELEPHONE (OUTPATIENT)
Dept: CARDIOLOGY | Facility: MEDICAL CENTER | Age: 58
End: 2023-09-15
Payer: MEDICARE

## 2023-09-15 NOTE — TELEPHONE ENCOUNTER
Attempted to called patient in regards to his labs that were order on his last visit with ASHOK Stratton no answer LVM for patient.

## 2023-09-26 ENCOUNTER — APPOINTMENT (OUTPATIENT)
Dept: CARDIOLOGY | Facility: MEDICAL CENTER | Age: 58
End: 2023-09-26
Attending: INTERNAL MEDICINE
Payer: MEDICARE

## 2023-10-05 NOTE — DISCHARGE PLANNING
Received Order in Response to Request for Court Ordered Involuntary Admission from the court continuing pt until 2/25 at 0900. Sent copy to JAMES Scanlon, scanned copy into media manager.   full weight-bearing

## 2023-10-20 NOTE — PROGRESS NOTES
Pharmacy Pharmacotherapy Consult for LOS >30 days    Admit Date: 11/3/2020      Medications were reviewed for appropriateness and ongoing need.     Current Facility-Administered Medications   Medication Dose Route Frequency Provider Last Rate Last Admin   • furosemide (LASIX) injection 20 mg  20 mg Intravenous BID DIURETIC Ashok Lazo M.D.   20 mg at 12/10/20 1545   • lisinopril (PRINIVIL) tablet 10 mg  10 mg Oral Q DAY Ashok Lazo M.D.       • metoprolol SR (TOPROL XL) tablet 50 mg  50 mg Oral BID Ashok Lazo M.D.   50 mg at 12/10/20 0541   • fluconazole (DIFLUCAN) tablet 400 mg  400 mg Oral DAILY Cristy Holley M.D.   400 mg at 12/10/20 0541   • cefTRIAXone (ROCEPHIN) syringe 2 g  2 g Intravenous Q24HRS Cristy Holley M.D.   2 g at 12/10/20 0539   • HYDROcodone-acetaminophen (NORCO) 5-325 MG per tablet 1-2 Tab  1-2 Tab Oral Q6HRS PRN John Inman M.D.   2 Tab at 12/09/20 2257   • insulin glargine (Lantus) injection  15 Units Subcutaneous QAM INSULIN John Inman M.D.   15 Units at 12/10/20 0602   • vancomycin (VANCOCIN) 1,000 mg in  mL IVPB  1,000 mg Intravenous Q24HR John Inman M.D. 41.7 mL/hr at 12/10/20 1252 1,000 mg at 12/10/20 1252   • acetaminophen (Tylenol) tablet 650 mg  650 mg Oral Q6HRS PRN Brittany Montana M.D.   650 mg at 12/06/20 0038   • diphenhydrAMINE (BENADRYL) tablet/capsule 25 mg  25 mg Oral Q8HRS PRN Zachary Ortega M.D.       • risperiDONE (RISPERDAL) tablet 0.5 mg  0.5 mg Oral QAM Zachary Ortega M.D.   0.5 mg at 12/10/20 0545   • risperiDONE (RISPERDAL) tablet 1 mg  1 mg Oral Q EVENING Zachary Ortega M.D.   1 mg at 12/09/20 1804   • modafinil (PROVIGIL) tablet 100 mg  100 mg Oral QAAME Peace M.D.   100 mg at 12/10/20 0540   • levothyroxine (SYNTHROID) tablet 112 mcg  112 mcg Oral AM  Fe Peace M.D.   112 mcg at 12/10/20 0541   • prochlorperazine (COMPAZINE) injection 10 mg  10 mg Intravenous Q4HRS PRN T.  Long Myrick M.D.   10 mg at 12/09/20 1233   • amiodarone (Cordarone) tablet 200 mg  200 mg Oral DAILY Fe Peace M.D.   200 mg at 12/10/20 0541   • traZODone (DESYREL) tablet 50 mg  50 mg Oral QHS Zahida Easley M.D.   50 mg at 12/08/20 2044   • Metoprolol Tartrate (LOPRESSOR) injection 5 mg  5 mg Intravenous Q30 MIN PRN JONATHAN Myrick M.D.       • Respiratory Therapy Consult   Nebulization Continuous RT JONATHAN Myrick M.D.       • MD Alert...Vancomycin per Pharmacy   Other PHARMACY TO DOSE Cristy Holley M.D.       • spironolactone (ALDACTONE) tablet 25 mg  25 mg Oral Q DAY Fe Peace M.D.   25 mg at 12/10/20 0540   • cetirizine (ZYRTEC) tablet 10 mg  10 mg Oral DAILY Fe Peace M.D.   10 mg at 12/10/20 0539   • famotidine (PEPCID) tablet 20 mg  20 mg Oral DAILY Fe Peace M.D.   20 mg at 12/10/20 0541   • insulin regular (HumuLIN R,NovoLIN R) injection  3-14 Units Subcutaneous Q6HRS Long Maxwell M.D.   Stopped at 12/08/20 0600    And   • glucose 4 g chewable tablet 16 g  16 g Oral Q15 MIN PRN Long Maxwell M.D.        And   • dextrose 50% (D50W) injection 50 mL  50 mL Intravenous Q15 MIN PRN Long Maxwell M.D.       • albuterol (PROVENTIL) 2.5mg/0.5ml nebulizer solution 2.5 mg  2.5 mg Nebulization Q4H PRN (RT) Kin Desouza D.O.           Recommendations:  No recommendations at this time.    Bee Ramsey, PharmD       Statement Selected

## 2023-11-04 ENCOUNTER — HOSPITAL ENCOUNTER (INPATIENT)
Facility: MEDICAL CENTER | Age: 58
LOS: 2 days | DRG: 920 | End: 2023-11-07
Attending: EMERGENCY MEDICINE | Admitting: HOSPITALIST
Payer: MEDICARE

## 2023-11-04 DIAGNOSIS — I48.91 ATRIAL FIBRILLATION, UNSPECIFIED TYPE (HCC): ICD-10-CM

## 2023-11-04 DIAGNOSIS — R58 BLEEDING: ICD-10-CM

## 2023-11-04 DIAGNOSIS — K08.409 S/P TOOTH EXTRACTION: ICD-10-CM

## 2023-11-04 PROBLEM — K13.79 ORAL BLEEDING: Status: ACTIVE | Noted: 2023-11-04

## 2023-11-04 LAB
ABO GROUP BLD: NORMAL
ALBUMIN SERPL BCP-MCNC: 3.2 G/DL (ref 3.2–4.9)
ALBUMIN/GLOB SERPL: 0.9 G/DL
ALP SERPL-CCNC: 77 U/L (ref 30–99)
ALT SERPL-CCNC: 10 U/L (ref 2–50)
ANION GAP SERPL CALC-SCNC: 9 MMOL/L (ref 7–16)
APTT PPP: 38.5 SEC (ref 24.7–36)
AST SERPL-CCNC: 20 U/L (ref 12–45)
BARCODED ABORH UBTYP: 5100
BARCODED ABORH UBTYP: 9500
BARCODED PRD CODE UBPRD: NORMAL
BARCODED PRD CODE UBPRD: NORMAL
BARCODED UNIT NUM UBUNT: NORMAL
BARCODED UNIT NUM UBUNT: NORMAL
BASOPHILS # BLD AUTO: 0.6 % (ref 0–1.8)
BASOPHILS # BLD: 0.04 K/UL (ref 0–0.12)
BILIRUB SERPL-MCNC: 0.9 MG/DL (ref 0.1–1.5)
BLD GP AB SCN SERPL QL: NORMAL
BUN SERPL-MCNC: 34 MG/DL (ref 8–22)
CALCIUM ALBUM COR SERPL-MCNC: 9.7 MG/DL (ref 8.5–10.5)
CALCIUM SERPL-MCNC: 9.1 MG/DL (ref 8.5–10.5)
CFT BLD TEG: 6.8 MIN (ref 4.6–9.1)
CFT P HPASE BLD TEG: 6.3 MIN (ref 4.3–8.3)
CHLORIDE SERPL-SCNC: 104 MMOL/L (ref 96–112)
CLOT ANGLE BLD TEG: 73.1 DEGREES (ref 63–78)
CLOT LYSIS 30M P MA LENFR BLD TEG: 2.5 % (ref 0–2.6)
CO2 SERPL-SCNC: 26 MMOL/L (ref 20–33)
COMPONENT R 8504R: NORMAL
COMPONENT R 8504R: NORMAL
CREAT SERPL-MCNC: 1.19 MG/DL (ref 0.5–1.4)
CT.EXTRINSIC BLD ROTEM: 1.3 MIN (ref 0.8–2.1)
EKG IMPRESSION: NORMAL
EOSINOPHIL # BLD AUTO: 0.22 K/UL (ref 0–0.51)
EOSINOPHIL NFR BLD: 3.5 % (ref 0–6.9)
ERYTHROCYTE [DISTWIDTH] IN BLOOD BY AUTOMATED COUNT: 53.1 FL (ref 35.9–50)
GFR SERPLBLD CREATININE-BSD FMLA CKD-EPI: 70 ML/MIN/1.73 M 2
GLOBULIN SER CALC-MCNC: 3.6 G/DL (ref 1.9–3.5)
GLUCOSE SERPL-MCNC: 107 MG/DL (ref 65–99)
HCT VFR BLD AUTO: 24.9 % (ref 42–52)
HGB BLD-MCNC: 6.4 G/DL (ref 14–18)
HGB BLD-MCNC: 7.5 G/DL (ref 14–18)
IMM GRANULOCYTES # BLD AUTO: 0.02 K/UL (ref 0–0.11)
IMM GRANULOCYTES NFR BLD AUTO: 0.3 % (ref 0–0.9)
INR PPP: 1.77 (ref 0.87–1.13)
LYMPHOCYTES # BLD AUTO: 1.04 K/UL (ref 1–4.8)
LYMPHOCYTES NFR BLD: 16.6 % (ref 22–41)
MCF BLD TEG: 55.6 MM (ref 52–69)
MCF.PLATELET INHIB BLD ROTEM: 17.6 MM (ref 15–32)
MCH RBC QN AUTO: 28.2 PG (ref 27–33)
MCHC RBC AUTO-ENTMCNC: 30.1 G/DL (ref 32.3–36.5)
MCV RBC AUTO: 93.6 FL (ref 81.4–97.8)
MONOCYTES # BLD AUTO: 0.62 K/UL (ref 0–0.85)
MONOCYTES NFR BLD AUTO: 9.9 % (ref 0–13.4)
NEUTROPHILS # BLD AUTO: 4.31 K/UL (ref 1.82–7.42)
NEUTROPHILS NFR BLD: 69.1 % (ref 44–72)
NRBC # BLD AUTO: 0 K/UL
NRBC BLD-RTO: 0 /100 WBC (ref 0–0.2)
PA AA BLD-ACNC: 11.9 % (ref 0–11)
PA ADP BLD-ACNC: 0.7 % (ref 0–17)
PLATELET # BLD AUTO: 178 K/UL (ref 164–446)
PMV BLD AUTO: 11.2 FL (ref 9–12.9)
POTASSIUM SERPL-SCNC: 4.5 MMOL/L (ref 3.6–5.5)
PRODUCT TYPE UPROD: NORMAL
PRODUCT TYPE UPROD: NORMAL
PROT SERPL-MCNC: 6.8 G/DL (ref 6–8.2)
PROTHROMBIN TIME: 20.9 SEC (ref 12–14.6)
RBC # BLD AUTO: 2.66 M/UL (ref 4.7–6.1)
RH BLD: NORMAL
SODIUM SERPL-SCNC: 139 MMOL/L (ref 135–145)
TEG ALGORITHM TGALG: ABNORMAL
UNIT STATUS USTAT: NORMAL
UNIT STATUS USTAT: NORMAL
WBC # BLD AUTO: 6.3 K/UL (ref 4.8–10.8)

## 2023-11-04 PROCEDURE — G0378 HOSPITAL OBSERVATION PER HR: HCPCS

## 2023-11-04 PROCEDURE — 85347 COAGULATION TIME ACTIVATED: CPT

## 2023-11-04 PROCEDURE — 700111 HCHG RX REV CODE 636 W/ 250 OVERRIDE (IP): Mod: JZ,UD | Performed by: HOSPITALIST

## 2023-11-04 PROCEDURE — 96374 THER/PROPH/DIAG INJ IV PUSH: CPT

## 2023-11-04 PROCEDURE — 700102 HCHG RX REV CODE 250 W/ 637 OVERRIDE(OP): Mod: UD | Performed by: HOSPITALIST

## 2023-11-04 PROCEDURE — 85610 PROTHROMBIN TIME: CPT

## 2023-11-04 PROCEDURE — 85384 FIBRINOGEN ACTIVITY: CPT

## 2023-11-04 PROCEDURE — 36415 COLL VENOUS BLD VENIPUNCTURE: CPT

## 2023-11-04 PROCEDURE — 85730 THROMBOPLASTIN TIME PARTIAL: CPT

## 2023-11-04 PROCEDURE — 36430 TRANSFUSION BLD/BLD COMPNT: CPT

## 2023-11-04 PROCEDURE — 99222 1ST HOSP IP/OBS MODERATE 55: CPT | Mod: AI | Performed by: HOSPITALIST

## 2023-11-04 PROCEDURE — 86850 RBC ANTIBODY SCREEN: CPT

## 2023-11-04 PROCEDURE — 85018 HEMOGLOBIN: CPT

## 2023-11-04 PROCEDURE — 80053 COMPREHEN METABOLIC PANEL: CPT

## 2023-11-04 PROCEDURE — 99285 EMERGENCY DEPT VISIT HI MDM: CPT

## 2023-11-04 PROCEDURE — 85576 BLOOD PLATELET AGGREGATION: CPT

## 2023-11-04 PROCEDURE — P9016 RBC LEUKOCYTES REDUCED: HCPCS

## 2023-11-04 PROCEDURE — 86900 BLOOD TYPING SEROLOGIC ABO: CPT

## 2023-11-04 PROCEDURE — 86901 BLOOD TYPING SEROLOGIC RH(D): CPT

## 2023-11-04 PROCEDURE — 86923 COMPATIBILITY TEST ELECTRIC: CPT

## 2023-11-04 PROCEDURE — A9270 NON-COVERED ITEM OR SERVICE: HCPCS | Mod: UD | Performed by: HOSPITALIST

## 2023-11-04 PROCEDURE — 93005 ELECTROCARDIOGRAM TRACING: CPT | Performed by: EMERGENCY MEDICINE

## 2023-11-04 PROCEDURE — 85025 COMPLETE CBC W/AUTO DIFF WBC: CPT

## 2023-11-04 RX ORDER — DAPAGLIFLOZIN 10 MG/1
10 TABLET, FILM COATED ORAL DAILY
Status: DISCONTINUED | OUTPATIENT
Start: 2023-11-05 | End: 2023-11-07 | Stop reason: HOSPADM

## 2023-11-04 RX ORDER — FUROSEMIDE 10 MG/ML
40 INJECTION INTRAMUSCULAR; INTRAVENOUS
Status: DISCONTINUED | OUTPATIENT
Start: 2023-11-04 | End: 2023-11-05

## 2023-11-04 RX ORDER — OXYCODONE HYDROCHLORIDE 10 MG/1
10 TABLET ORAL
Status: DISCONTINUED | OUTPATIENT
Start: 2023-11-04 | End: 2023-11-07 | Stop reason: HOSPADM

## 2023-11-04 RX ORDER — POLYETHYLENE GLYCOL 3350 17 G/17G
1 POWDER, FOR SOLUTION ORAL
Status: DISCONTINUED | OUTPATIENT
Start: 2023-11-04 | End: 2023-11-07 | Stop reason: HOSPADM

## 2023-11-04 RX ORDER — BISACODYL 10 MG
10 SUPPOSITORY, RECTAL RECTAL
Status: DISCONTINUED | OUTPATIENT
Start: 2023-11-04 | End: 2023-11-07 | Stop reason: HOSPADM

## 2023-11-04 RX ORDER — AMOXICILLIN 250 MG
2 CAPSULE ORAL 2 TIMES DAILY
Status: DISCONTINUED | OUTPATIENT
Start: 2023-11-04 | End: 2023-11-07 | Stop reason: HOSPADM

## 2023-11-04 RX ORDER — TAMSULOSIN HYDROCHLORIDE 0.4 MG/1
0.4 CAPSULE ORAL
Status: DISCONTINUED | OUTPATIENT
Start: 2023-11-05 | End: 2023-11-07 | Stop reason: HOSPADM

## 2023-11-04 RX ORDER — TRAZODONE HYDROCHLORIDE 50 MG/1
50 TABLET ORAL NIGHTLY
Status: DISCONTINUED | OUTPATIENT
Start: 2023-11-04 | End: 2023-11-07 | Stop reason: HOSPADM

## 2023-11-04 RX ORDER — ROPINIROLE 0.5 MG/1
0.5 TABLET, FILM COATED ORAL
Status: DISCONTINUED | OUTPATIENT
Start: 2023-11-04 | End: 2023-11-07 | Stop reason: HOSPADM

## 2023-11-04 RX ORDER — CLONAZEPAM 0.5 MG/1
0.25 TABLET ORAL 2 TIMES DAILY
Status: DISCONTINUED | OUTPATIENT
Start: 2023-11-04 | End: 2023-11-07 | Stop reason: HOSPADM

## 2023-11-04 RX ORDER — LEVOTHYROXINE SODIUM 0.07 MG/1
150 TABLET ORAL
Status: DISCONTINUED | OUTPATIENT
Start: 2023-11-05 | End: 2023-11-07 | Stop reason: HOSPADM

## 2023-11-04 RX ORDER — DOXYCYCLINE 100 MG/1
100 TABLET ORAL EVERY 12 HOURS
Status: DISCONTINUED | OUTPATIENT
Start: 2023-11-04 | End: 2023-11-07 | Stop reason: HOSPADM

## 2023-11-04 RX ORDER — ACETAMINOPHEN 325 MG/1
650 TABLET ORAL EVERY 6 HOURS PRN
Status: DISCONTINUED | OUTPATIENT
Start: 2023-11-04 | End: 2023-11-07 | Stop reason: HOSPADM

## 2023-11-04 RX ORDER — HYDROMORPHONE HYDROCHLORIDE 1 MG/ML
0.5 INJECTION, SOLUTION INTRAMUSCULAR; INTRAVENOUS; SUBCUTANEOUS
Status: DISCONTINUED | OUTPATIENT
Start: 2023-11-04 | End: 2023-11-07 | Stop reason: HOSPADM

## 2023-11-04 RX ORDER — OXYCODONE HYDROCHLORIDE 5 MG/1
5 TABLET ORAL
Status: DISCONTINUED | OUTPATIENT
Start: 2023-11-04 | End: 2023-11-07 | Stop reason: HOSPADM

## 2023-11-04 RX ORDER — CHLORHEXIDINE GLUCONATE ORAL RINSE 1.2 MG/ML
15 SOLUTION DENTAL 2 TIMES DAILY
Status: DISCONTINUED | OUTPATIENT
Start: 2023-11-04 | End: 2023-11-07 | Stop reason: HOSPADM

## 2023-11-04 RX ORDER — ZIPRASIDONE HYDROCHLORIDE 40 MG/1
40 CAPSULE ORAL 2 TIMES DAILY
Status: DISCONTINUED | OUTPATIENT
Start: 2023-11-04 | End: 2023-11-07

## 2023-11-04 RX ORDER — HYDRALAZINE HYDROCHLORIDE 20 MG/ML
10 INJECTION INTRAMUSCULAR; INTRAVENOUS EVERY 4 HOURS PRN
Status: DISCONTINUED | OUTPATIENT
Start: 2023-11-04 | End: 2023-11-07 | Stop reason: HOSPADM

## 2023-11-04 RX ADMIN — OXYCODONE HYDROCHLORIDE 10 MG: 10 TABLET ORAL at 19:46

## 2023-11-04 RX ADMIN — CLONAZEPAM 0.25 MG: 0.5 TABLET ORAL at 17:41

## 2023-11-04 RX ADMIN — ROPINIROLE HYDROCHLORIDE 0.5 MG: 0.5 TABLET, FILM COATED ORAL at 20:35

## 2023-11-04 RX ADMIN — FUROSEMIDE 40 MG: 10 INJECTION, SOLUTION INTRAVENOUS at 15:53

## 2023-11-04 RX ADMIN — ZIPRASIDONE HYDROCHLORIDE 40 MG: 40 CAPSULE ORAL at 17:41

## 2023-11-04 RX ADMIN — 0.12% CHLORHEXIDINE GLUCONATE 15 ML: 1.2 RINSE ORAL at 17:41

## 2023-11-04 RX ADMIN — TRAZODONE HYDROCHLORIDE 50 MG: 50 TABLET ORAL at 20:35

## 2023-11-04 RX ADMIN — DOXYCYCLINE 100 MG: 100 TABLET, FILM COATED ORAL at 17:41

## 2023-11-04 ASSESSMENT — ENCOUNTER SYMPTOMS
CHILLS: 0
BLURRED VISION: 0
SENSORY CHANGE: 0
SHORTNESS OF BREATH: 0
VOMITING: 0
HEARTBURN: 0
EYE PAIN: 0
BRUISES/BLEEDS EASILY: 1
DOUBLE VISION: 0
HEADACHES: 0
TREMORS: 0
MYALGIAS: 1
SPUTUM PRODUCTION: 0
COUGH: 0
FEVER: 0
SPEECH CHANGE: 0
DEPRESSION: 0
PHOTOPHOBIA: 0
DIZZINESS: 0
HEMOPTYSIS: 0
NAUSEA: 0
WEIGHT LOSS: 0

## 2023-11-04 ASSESSMENT — LIFESTYLE VARIABLES
TOTAL SCORE: 0
AVERAGE NUMBER OF DAYS PER WEEK YOU HAVE A DRINK CONTAINING ALCOHOL: 0
HOW MANY TIMES IN THE PAST YEAR HAVE YOU HAD 5 OR MORE DRINKS IN A DAY: 0
HAVE PEOPLE ANNOYED YOU BY CRITICIZING YOUR DRINKING: NO
EVER FELT BAD OR GUILTY ABOUT YOUR DRINKING: NO
CONSUMPTION TOTAL: NEGATIVE
ON A TYPICAL DAY WHEN YOU DRINK ALCOHOL HOW MANY DRINKS DO YOU HAVE: 0
SUBSTANCE_ABUSE: 0
HAVE YOU EVER FELT YOU SHOULD CUT DOWN ON YOUR DRINKING: NO
TOTAL SCORE: 0
TOTAL SCORE: 0
ALCOHOL_USE: NO
EVER HAD A DRINK FIRST THING IN THE MORNING TO STEADY YOUR NERVES TO GET RID OF A HANGOVER: NO

## 2023-11-04 ASSESSMENT — PATIENT HEALTH QUESTIONNAIRE - PHQ9
2. FEELING DOWN, DEPRESSED, IRRITABLE, OR HOPELESS: NOT AT ALL
SUM OF ALL RESPONSES TO PHQ9 QUESTIONS 1 AND 2: 0
SUM OF ALL RESPONSES TO PHQ9 QUESTIONS 1 AND 2: 0
1. LITTLE INTEREST OR PLEASURE IN DOING THINGS: NOT AT ALL
2. FEELING DOWN, DEPRESSED, IRRITABLE, OR HOPELESS: NOT AT ALL
1. LITTLE INTEREST OR PLEASURE IN DOING THINGS: NOT AT ALL

## 2023-11-04 ASSESSMENT — PAIN DESCRIPTION - PAIN TYPE
TYPE: ACUTE PAIN

## 2023-11-04 ASSESSMENT — FIBROSIS 4 INDEX
FIB4 SCORE: 2.27
FIB4 SCORE: 2.13

## 2023-11-04 NOTE — H&P
Hospital Medicine History & Physical Note    Date of Service  11/4/2023    Primary Care Physician  Marielle Alvarez M.D.    Consultants      Code Status  Prior    Chief Complaint  Chief Complaint   Patient presents with    Post Op Bleeding     3 teeth extracted 11/2 with persistent oral bleeding since then. On eliquis for afib, last dose 11/1 AM. Transfer from [facility] where he was given TXA x 2, surgicell, and 1 unit PRBC for hgb 6.9.       History of Presenting Illness    Sebastián Castro is a 58 y.o. male with past medical history of CHF, A-fib who presented to the emergency department at [facility] With bleeding ongoing for the last 2 days.  He apparently had 2 teeth removed on Thursday but on Friday he noted some bleeding coming from his mouth.  It did not stop so he sought medical care.  Patient has not taken his Eliquis for the last 3 days.  He takes Eliquis for his history of paroxysmal A-fib.  Patient is complaining of some mild mouth pain at the site of where the teeth were extracted    He was initially seen at [facility] ED for this where he was given TXA twice, surgicell and has blood transfused for hemoglobin of 6.9.  I discussed the plan of care with patient.    Review of Systems  Review of Systems   Constitutional:  Negative for chills, fever and weight loss.   HENT:  Negative for ear discharge, hearing loss and tinnitus.    Eyes:  Negative for blurred vision, double vision, photophobia and pain.   Respiratory:  Negative for cough, hemoptysis, sputum production and shortness of breath.    Gastrointestinal:  Negative for heartburn, nausea and vomiting.   Genitourinary:  Negative for dysuria and frequency.   Musculoskeletal:  Positive for myalgias.   Neurological:  Negative for dizziness, tremors, sensory change, speech change and headaches.   Endo/Heme/Allergies:  Bruises/bleeds easily.   Psychiatric/Behavioral:  Negative for depression and substance abuse.        Past Medical History   has a past  medical history of A-fib ([facility]), Abscess (11/8/2020), Bacteremia (11/8/2020), Chest pain, Congestive heart failure (HCC), Diabetes ([facility]), Diabetic neuropathy ([facility]), Hypercholesterolemia, Hypertension, Longstanding persistent atrial fibrillation ([facility]) (11/8/2020), LV dysfunction (11/8/2020), Morbid obesity ([facility]), NICM (nonischemic cardiomyopathy) ([facility]) (11/8/2020), Noncompliance, Normal cardiac stress test, Orthostatic hypotension, and Sepsis ([facility]) (11/8/2020).    Surgical History   has a past surgical history that includes toe amputation (Right, 11/10/2017); toe amputation (Right, 1/17/2018); irrigation & debridement ortho (Right, 2/19/2018); zzz cardiac cath (07/21/2018); colectomy (N/A, 11/10/2020); pr exploratory of abdomen (5/27/2021); and colostomy takedown (5/27/2021).     Family History  family history includes No Known Problems in his father and mother.   Family history reviewed with patient. There is no family history that is pertinent to the chief complaint.     Social History   reports that he has never smoked. He has never used smokeless tobacco. He reports that he does not drink alcohol and does not use drugs.    Allergies  Allergies   Allergen Reactions    Zosyn [Piperacillin Sod-Tazobactam So] Rash and Itching     Redness/flushed throughout body.     Daptomycin Rash     Body rash  RXN=1/2018      Pcn [Penicillins] Hives and Rash      Rash & hives  RXN=since a kid  Tolerates cephalosporins    Unasyn [Ampicillin-Sulbactam Sodium] Hives, Itching and Swelling     Received doses as recently as 2017 without incident. On 2/18/2018 developed a rash after receiving Unasyn.    Vancomycin Rash     Red man syndrome. Tolerates IV vancomycin at reduced infusion rate.       Medications  Prior to Admission Medications   Prescriptions Last Dose Informant Patient Reported? Taking?   Magnesium Oxide (MAG-OX PO)   Yes No   Sig: Take  by mouth.   PARoxetine (PAXIL) 30 MG Tab  MAR from Other Facility Yes No   Sig: Take 30  mg by mouth every day.   ROPINIRole (REQUIP) 0.25 MG Tab  MAR from Other Facility Yes No   Sig: Take 0.5 mg by mouth at bedtime.   apixaban (ELIQUIS) 5mg Tab  MAR from Other Facility No No   Sig: Take 1 Tablet by mouth 2 times a day. Indications: Thromboembolism secondary to Atrial Fibrillation   clonazePAM (KLONOPIN) 0.5 MG Tab  MAR from Other Facility Yes No   Sig: Take  by mouth 2 times a day.   dapagliflozin propanediol (FARXIGA) 10 MG Tab   No No   Sig: Take 1 Tablet by mouth every day.   furosemide (LASIX) 20 MG Tab   No No   Sig: Take 1 Tablet by mouth as needed (Take 1 as needed for any wieght gain of 3lbs on a day or 5lbs in a week or any increased lower leg swelling or shortness of breath.).   levothyroxine (SYNTHROID) 150 MCG Tab  MAR from Other Facility No No   Sig: Take 1 tablet by mouth every morning on an empty stomach.   loperamide (IMODIUM) 2 MG Cap   Yes No   Sig: Take 2 mg by mouth 4 times a day as needed for Diarrhea.   metoprolol SR (TOPROL XL) 100 MG TABLET SR 24 HR   Yes No   Sig: Take 1 Tablet by mouth every day.   omeprazole (PRILOSEC) 40 MG delayed-release capsule  MAR from Other Facility No No   Sig: Take 1 capsule by mouth every 12 hours.   spironolactone (ALDACTONE) 25 MG Tab   No No   Sig: Take 1 Tablet by mouth every day.   tamsulosin (FLOMAX) 0.4 MG capsule  MAR from Other Facility No No   Sig: Take 1 capsule by mouth 1/2 hour after breakfast.   therapeutic multivitamin-minerals (THERAGRAN-M) Tab  MAR from Other Facility Yes No   Sig: Take 1 Tablet by mouth every day.   traZODone (DESYREL) 50 MG Tab  MAR from Other Facility Yes No   Sig: Take 50 mg by mouth every evening.   vitamin D (VITAMIND D3) 1000 UNIT Tab  MAR from Other Facility No No   Sig: Take 1 tablet by mouth every day.   ziprasidone (GEODON) 40 MG Cap  MAR from Other Facility Yes No   Sig: Take 40 mg by mouth 2 times a day.      Facility-Administered Medications: None       Physical Exam  Temp:  [36.3 °C (97.3 °F)] 36.3  °C (97.3 °F)  Pulse:  [100] 100  Resp:  [20] 20  BP: (139)/(66) 139/66  SpO2:  [93 %] 93 %  Blood Pressure: 139/66   Temperature: 36.3 °C (97.3 °F)   Pulse: 100   Respiration: 20   Pulse Oximetry: 93 %       Physical Exam  Constitutional:       General: He is not in acute distress.     Appearance: He is ill-appearing. He is not toxic-appearing.   HENT:      Head: Normocephalic.      Mouth/Throat:      Mouth: Mucous membranes are moist.   Eyes:      General: No scleral icterus.        Left eye: No discharge.      Extraocular Movements: Extraocular movements intact.      Pupils: Pupils are equal, round, and reactive to light.   Cardiovascular:      Rate and Rhythm: Normal rate. Rhythm irregular.      Heart sounds: No murmur heard.     No friction rub. No gallop.   Pulmonary:      Effort: No respiratory distress.      Breath sounds: No wheezing.   Abdominal:      General: There is distension.      Palpations: There is no mass.      Tenderness: There is no rebound.      Hernia: No hernia is present.   Musculoskeletal:         General: Normal range of motion.      Cervical back: Normal range of motion.      Right lower leg: Edema (+2 pitting) present.      Left lower leg: Edema (+2 pitting) present.   Skin:     Capillary Refill: Capillary refill takes 2 to 3 seconds.      Findings: Bruising present.   Neurological:      General: No focal deficit present.      Motor: Weakness present.   Psychiatric:         Mood and Affect: Mood normal.         Behavior: Behavior normal.         Laboratory:  Recent Labs     11/04/23  1139   WBC 6.3   RBC 2.66*   HEMOGLOBIN 7.5*   HEMATOCRIT 24.9*   MCV 93.6   MCH 28.2   MCHC 30.1*   RDW 53.1*   PLATELETCT 178   MPV 11.2     Recent Labs     11/04/23  1139   SODIUM 139   POTASSIUM 4.5   CHLORIDE 104   CO2 26   GLUCOSE 107*   BUN 34*   CREATININE 1.19   CALCIUM 9.1     Recent Labs     11/04/23  1139   ALTSGPT 10   ASTSGOT 20   ALKPHOSPHAT 77   TBILIRUBIN 0.9   GLUCOSE 107*     Recent Labs     " 11/04/23  1139   APTT 38.5*   INR 1.77*     No results for input(s): \"NTPROBNP\" in the last 72 hours.      No results for input(s): \"TROPONINT\" in the last 72 hours.    Imaging:  No orders to display       X-Ray:  I have personally reviewed the images and compared with prior images.      EKG reviewed by me atrial fibrillation rate of 94  Assessment/Plan:  Justification for Admission Status  I anticipate this patient is appropriate for observation status at this time because I suspect patient require less than 40 hours for evaluation treatment of his oral bleeding        * Oral bleeding- (present on admission)  Assessment & Plan  Related to Xarelto in the setting of tooth extractions.    Monitor and supportive care and transfuse PRBCs if hemoglobin is less than 7    Dr. Giang oral surgery is available if needed    S/P tooth extraction- (present on admission)  Assessment & Plan  Antibiotic doxycycline and p.o. Peridex mouthwash    Chronic diastolic heart failure (HCC)- (present on admission)  Assessment & Plan  Bleeding has temporary stopped at this time but he is not euvolemic and would benefit from diuresis with IV Lasix    Not in acute exacerbation    AF (atrial fibrillation) (HCC)- (present on admission)  Assessment & Plan  Rate controlled and holding Xarelto due to active bleeding        VTE prophylaxis: SCDs/TEDs  "

## 2023-11-04 NOTE — ED NOTES
Patient is from [facility]  Waiting on fax from [facility]

## 2023-11-04 NOTE — ED TRIAGE NOTES
Sebastián Castro  58 y.o. male  Chief Complaint   Patient presents with    Post Op Bleeding     3 teeth extracted 11/2 with persistent oral bleeding since then. On eliquis for afib, last dose 11/1 AM. Transfer from [facility] where he was given TXA x 2, surgicell, and 1 unit PRBC for hgb 6.9.       Pt BIB EMS from [facility] for above complaint. Oozing continues from gums. Suction at bedside, pt using yankauer as needed. Airway patent and intact.      Pt is alert and oriented, speaking in full sentences, and following commands. Placed in gown and connected to monitors. Chart up for ERP.    /66   Pulse 100   Temp 36.3 °C (97.3 °F) (Temporal)   Resp 20   Ht 1.829 m (6')   Wt (!) 131 kg (289 lb)   SpO2 93%   BMI 39.20 kg/m²     Past Medical History:   Diagnosis Date    A-fib (HCC)     Abscess 11/8/2020    Bacteremia 11/8/2020    Chest pain     Congestive heart failure (HCC)     Diabetes ([facility])     Diabetic neuropathy ([facility])     Hypercholesterolemia     Hypertension     Longstanding persistent atrial fibrillation ([facility]) 11/8/2020    LV dysfunction 11/8/2020    Morbid obesity ([facility])     NICM (nonischemic cardiomyopathy) ([facility]) 11/8/2020    Noncompliance     Normal cardiac stress test     Orthostatic hypotension     Sepsis ([facility]) 11/8/2020

## 2023-11-04 NOTE — ASSESSMENT & PLAN NOTE
11/7/2023  Diuretics have been held on 11/5/2023 as we saw effective diuresis on 11/4/2023 and patient is currently dealing with bleeding episodes we do not want patient become hypovolemic  Not in acute exacerbation

## 2023-11-04 NOTE — ASSESSMENT & PLAN NOTE
11/7/2023  Related to Apixaban in the setting of tooth extractions.    Monitor and supportive care and transfuse PRBCs if hemoglobin is less than 7    Dr. Giang oral surgery is available if needed

## 2023-11-04 NOTE — ED PROVIDER NOTES
CHIEF COMPLAINT  Chief Complaint   Patient presents with    Post Op Bleeding     3 teeth extracted 11/2 with persistent oral bleeding since then. On eliquis for afib, last dose 11/1 AM. Transfer from [facility] where he was given TXA x 2, surgicell, and 1 unit PRBC for hgb 6.9.     LIMITATION TO HISTORY   Select: none    HPI    Sebastián Castro is a 58 y.o. male who presents to the Emergency Department for post op bleeding onset 2 days ago. He had oral surgery to remove two teeth on Thursday, immediately afterward he did not have any bleeding. Yesterday morning some blood began to ooze from the teeth, it has not stopped since then. He does take Eliquis but stopped it, his last dose was three days ago. He lives at a nursing home as he cannot walk. He was seen at [facility] for this where he was given TXA twice, surgicell and has blood transfused for hemoglobin of 6.9.    OUTSIDE HISTORIAN(S):  Select: None    EXTERNAL RECORDS REVIEWED  Select: Seen at sleep clinic on 10/26/23 for sleep apnea    PAST MEDICAL HISTORY  Past Medical History:   Diagnosis Date    A-fib (HCC)     Abscess 11/8/2020    Bacteremia 11/8/2020    Chest pain     Congestive heart failure (HCC)     Diabetes ([facility])     Diabetic neuropathy (HCC)     Hypercholesterolemia     Hypertension     Longstanding persistent atrial fibrillation ([facility]) 11/8/2020    LV dysfunction 11/8/2020    Morbid obesity ([facility])     NICM (nonischemic cardiomyopathy) ([facility]) 11/8/2020    Noncompliance     Normal cardiac stress test     Orthostatic hypotension     Sepsis ([facility]) 11/8/2020     SURGICAL HISTORY  Past Surgical History:   Procedure Laterality Date    AR EXPLORATORY OF ABDOMEN  5/27/2021    Procedure: LAPAROTOMY, EXPLORATORY;  Surgeon: Kin Desouza D.O.;  Location: SURGERY [facility];  Service: General    COLOSTOMY TAKEDOWN  5/27/2021    Procedure: COLOSTOMY TAKEDOWN AND CLOSURE;  Surgeon: Kin Desouza D.O.;  Location: [facility];  Service: General    COLECTOMY N/A  11/10/2020    Procedure: COLECTOMY-SEGMENTAL, COLOSTOMY, MOBILIZATION OF SPLENIC FLEXURE, WOUND VAC PLACEMENT, IRRIGATION AND DEBRIDMENT FLANK WOUND;  Surgeon: Kin Desouza D.O.;  Location: SURGERY [facility];  Service: General    Z CARDIAC CATH  07/21/2018    EF 45%, normal coronaries.    IRRIGATION & DEBRIDEMENT ORTHO Right 2/19/2018    Procedure: IRRIGATION & DEBRIDEMENT ORTHO-FOOT;  Surgeon: Kirby Lopez M.D.;  Location: SURGERY [facility];  Service: Orthopedics    TOE AMPUTATION Right 1/17/2018    Procedure: TOE AMPUTATION-1ST RAY;  Surgeon: Doug Delong M.D.;  Location: SURGERY [facility];  Service: Orthopedics    TOE AMPUTATION Right 11/10/2017    Procedure: TOE AMPUTATION;  Surgeon: Osorio Leo M.D.;  Location: SURGERY [facility];  Service: Orthopedics     FAMILY HISTORY  Family History   Problem Relation Age of Onset    No Known Problems Mother     No Known Problems Father       SOCIAL HISTORY  Social History     Tobacco Use    Smoking status: Never    Smokeless tobacco: Never   Vaping Use    Vaping Use: Never used   Substance Use Topics    Alcohol use: No    Drug use: No         CURRENT MEDICATIONS  No current facility-administered medications on file prior to encounter.     Current Outpatient Medications on File Prior to Encounter   Medication Sig Dispense Refill    loperamide (IMODIUM) 2 MG Cap Take 2 mg by mouth 4 times a day as needed for Diarrhea.      Magnesium Oxide (MAG-OX PO) Take  by mouth.      furosemide (LASIX) 20 MG Tab Take 1 Tablet by mouth as needed (Take 1 as needed for any wieght gain of 3lbs on a day or 5lbs in a week or any increased lower leg swelling or shortness of breath.). 30 Tablet 3    dapagliflozin propanediol (FARXIGA) 10 MG Tab Take 1 Tablet by mouth every day. 30 Tablet 11    metoprolol SR (TOPROL XL) 100 MG TABLET SR 24 HR Take 1 Tablet by mouth every day.      spironolactone (ALDACTONE) 25 MG Tab Take 1 Tablet by mouth every day. 30 Tablet 3     therapeutic multivitamin-minerals (THERAGRAN-M) Tab Take 1 Tablet by mouth every day.      PARoxetine (PAXIL) 30 MG Tab Take 30 mg by mouth every day.      ROPINIRole (REQUIP) 0.25 MG Tab Take 0.5 mg by mouth at bedtime.      traZODone (DESYREL) 50 MG Tab Take 50 mg by mouth every evening.      ziprasidone (GEODON) 40 MG Cap Take 40 mg by mouth 2 times a day.      apixaban (ELIQUIS) 5mg Tab Take 1 Tablet by mouth 2 times a day. Indications: Thromboembolism secondary to Atrial Fibrillation 180 Tablet 3    clonazePAM (KLONOPIN) 0.5 MG Tab Take  by mouth 2 times a day.      levothyroxine (SYNTHROID) 150 MCG Tab Take 1 tablet by mouth every morning on an empty stomach. 30 tablet     tamsulosin (FLOMAX) 0.4 MG capsule Take 1 capsule by mouth 1/2 hour after breakfast. 30 capsule     omeprazole (PRILOSEC) 40 MG delayed-release capsule Take 1 capsule by mouth every 12 hours. 30 capsule     vitamin D (VITAMIND D3) 1000 UNIT Tab Take 1 tablet by mouth every day. 60 tablet          ALLERGIES  Allergies   Allergen Reactions    Zosyn [Piperacillin Sod-Tazobactam So] Rash and Itching     Redness/flushed throughout body.     Daptomycin Rash     Body rash  RXN=1/2018      Pcn [Penicillins] Hives and Rash      Rash & hives  RXN=since a kid  Tolerates cephalosporins    Unasyn [Ampicillin-Sulbactam Sodium] Hives, Itching and Swelling     Received doses as recently as 2017 without incident. On 2/18/2018 developed a rash after receiving Unasyn.    Vancomycin Rash     Red man syndrome. Tolerates IV vancomycin at reduced infusion rate.     PHYSICAL EXAM  VITAL SIGNS:/66   Pulse 100   Temp 36.3 °C (97.3 °F) (Temporal)   Resp 20   Ht 1.829 m (6')   Wt (!) 131 kg (289 lb)   SpO2 93%   BMI 39.20 kg/m²     Constitutional: Well-developed no acute distress   HENT: Normocephalic, Atraumatic, Bilateral external ears normal. Left lower jaw molars with surgical removal, no active bleeding. Blood and ecchymosis noted to left jaw, no  active bleeding from gum line.   Eyes:  conjunctiva are pale.   Neck: Supple.  Nontender midline  Cardiovascular: Regular rate and rhythm without gallops or rubs. 3/6 murmur heard at left sternal border  Thorax & Lungs: No respiratory distress. Breathing comfortably. Lungs are clear to auscultation bilaterally, there are no wheezes no rales. Chest wall is nontender.  Abdomen: Soft, non distended, non tender   Skin: Warm, Dry, No erythema,   Back: No tenderness, No CVA tenderness.  Musculoskeletal: No clubbing cyanosis, good range of motion. Bilateral edema to lower extremities, mild tenderness no erythema  Neurologic: Alert & oriented x 3, normal sensation moving all extremities appears normal   Psychiatric: Affect normal, Judgment normal, Mood normal.     DIAGNOSTIC STUDIES / PROCEDURES  EKG  I have independently interpreted this EKG  Interpreted below by myself    LABS  Results for orders placed or performed during the hospital encounter of 11/04/23   COD - Adult (Type and Screen)   Result Value Ref Range    ABO Grouping Only O     Rh Grouping Only POS     Antibody Screen-Cod NEG    CBC WITH DIFFERENTIAL   Result Value Ref Range    WBC 6.3 4.8 - 10.8 K/uL    RBC 2.66 (L) 4.70 - 6.10 M/uL    Hemoglobin 7.5 (L) 14.0 - 18.0 g/dL    Hematocrit 24.9 (L) 42.0 - 52.0 %    MCV 93.6 81.4 - 97.8 fL    MCH 28.2 27.0 - 33.0 pg    MCHC 30.1 (L) 32.3 - 36.5 g/dL    RDW 53.1 (H) 35.9 - 50.0 fL    Platelet Count 178 164 - 446 K/uL    MPV 11.2 9.0 - 12.9 fL    Neutrophils-Polys 69.10 44.00 - 72.00 %    Lymphocytes 16.60 (L) 22.00 - 41.00 %    Monocytes 9.90 0.00 - 13.40 %    Eosinophils 3.50 0.00 - 6.90 %    Basophils 0.60 0.00 - 1.80 %    Immature Granulocytes 0.30 0.00 - 0.90 %    Nucleated RBC 0.00 0.00 - 0.20 /100 WBC    Neutrophils (Absolute) 4.31 1.82 - 7.42 K/uL    Lymphs (Absolute) 1.04 1.00 - 4.80 K/uL    Monos (Absolute) 0.62 0.00 - 0.85 K/uL    Eos (Absolute) 0.22 0.00 - 0.51 K/uL    Baso (Absolute) 0.04 0.00 - 0.12  K/uL    Immature Granulocytes (abs) 0.02 0.00 - 0.11 K/uL    NRBC (Absolute) 0.00 K/uL   COMP METABOLIC PANEL   Result Value Ref Range    Sodium 139 135 - 145 mmol/L    Potassium 4.5 3.6 - 5.5 mmol/L    Chloride 104 96 - 112 mmol/L    Co2 26 20 - 33 mmol/L    Anion Gap 9.0 7.0 - 16.0    Glucose 107 (H) 65 - 99 mg/dL    Bun 34 (H) 8 - 22 mg/dL    Creatinine 1.19 0.50 - 1.40 mg/dL    Calcium 9.1 8.5 - 10.5 mg/dL    Correct Calcium 9.7 8.5 - 10.5 mg/dL    AST(SGOT) 20 12 - 45 U/L    ALT(SGPT) 10 2 - 50 U/L    Alkaline Phosphatase 77 30 - 99 U/L    Total Bilirubin 0.9 0.1 - 1.5 mg/dL    Albumin 3.2 3.2 - 4.9 g/dL    Total Protein 6.8 6.0 - 8.2 g/dL    Globulin 3.6 (H) 1.9 - 3.5 g/dL    A-G Ratio 0.9 g/dL   PROTHROMBIN TIME   Result Value Ref Range    PT 20.9 (H) 12.0 - 14.6 sec    INR 1.77 (H) 0.87 - 1.13   APTT   Result Value Ref Range    APTT 38.5 (H) 24.7 - 36.0 sec   ESTIMATED GFR   Result Value Ref Range    GFR (CKD-EPI) 70 >60 mL/min/1.73 m 2   EKG   Result Value Ref Range    Report       [facility]    Test Date:  2023  Pt Name:    EMELY FLORES                Department: ER  MRN:        [id number]                      Room:        20  Gender:     Male                         Technician: [id number]  :        1965                   Requested By:ER TRIAGE PROTOCOL  Order #:    [id number]                    Reading MD: SUZI DRISCOLL MD    Measurements  Intervals                                Axis  Rate:       94                           P:          0  AR:         0                            QRS:        -21  QRSD:       100                          T:          74  QT:         380  QTc:        476    Interpretive Statements  Atrial fibrillation  Borderline left axis deviation  Borderline low voltage, extremity leads  Abnormal R-wave progression, late transition  Borderline prolonged QT interval  Compared to ECG 2022 10:09:11  Left posterior fascicular block no  longer present  Incomplete right bundle-branch block no margarito eleanor present  Electronically Signed On 11- 12:42:50 PDT by SUZI DRISCOLL MD       All labs reviewed by me.     COURSE & MEDICAL DECISION MAKING    ED COURSE:    ED Observation Status? No, this patient does not meet criteria for ED Observation    11:44 AM - Patient seen and examined at bedside. He presents for post op bleeding, recently had two teeth removed. Had bleeding from those regions starting yesterday evening, was seen at [facility] and given TXA x2, surgicell and a unit of blood. Hemoglobin dropped to 6.9 from above 9. Discussed plan of care, including consultation with oral surgery and repeat labs. Will also admit the patient for continued monitoring. Patient agrees to the plan of care. Ordered for APTT, platelet mapping, COD, CBC w/ diff, CMP, prothrombin time and EKG to evaluate his symptoms.     INITIAL ASSESSMENT, COURSE AND PLAN  Care Narrative: Patient presents to the emerged part for evaluation for clinically the patient has no signs of active bleeding.  I did speak with Dr. Giang who is on for oral surgery and at this point he has no need for procedural hemostasis.  Because the patient did require blood transfusion and did have some active bleeding I do feel the patient should be observed overnight for any rebleeding.  If there is no rebleeding in 24 hours and the patient can be restarted back on his Eliquis per Dr. Giang.  I spoken to the hospitalist for admission.    12:24 PM I discussed the patient's case and the above findings with Dr. Giang (Oral Surgery) who advises that as long as the patient is hemostatic, there are no interventions needed. He is available for consult if needed though.      12:51 PM I discussed the patient's case and the above findings with Dr. Maldonado (Hospitalist) who will assess the patient for hospitalization.       ADDITIONAL PROBLEM LIST  Patient currently is nonambulatory at [facility]  facility    DISPOSITION AND DISCUSSIONS  I have discussed management of the patient with the following physicians and EDDI's:  Dr. Giang (Oral Surgery), Dr. Maldonado (Hospitalist)    Discussion of management with other [facility] or appropriate source(s): None     Barriers to care at this time, including but not limited to: None      DISPOSITION:  Patient will be hospitalized by Dr. Maldonado in guarded condition.     FINAL DIAGNOSIS  1. Bleeding    2. Atrial fibrillation, unspecified type (HCC)      ITom (Scribe), am scribing for, and in the presence of, Kin Baez M.D..    Electronically signed by: Tom Bautista (Antonina), 11/4/2023    IKin M.D. personally performed the services described in this documentation, as scribed by Tom Bautista in my presence, and it is both accurate and complete.     Electronically signed by: Kin Baez M.D.,1:59 PM 11/04/23

## 2023-11-04 NOTE — ED NOTES
Patient to floor via transport. Patient in stable condition with no acute changes. Chart, medications, and all belongings with patient.

## 2023-11-05 PROBLEM — K92.1 MELANOTIC STOOLS: Status: ACTIVE | Noted: 2023-11-05

## 2023-11-05 PROBLEM — D62 ACUTE BLOOD LOSS ANEMIA: Status: ACTIVE | Noted: 2023-11-05

## 2023-11-05 LAB
HCT VFR BLD AUTO: 26.3 % (ref 42–52)
HGB BLD-MCNC: 8.2 G/DL (ref 14–18)
HGB BLD-MCNC: 8.4 G/DL (ref 14–18)
HGB BLD-MCNC: 8.5 G/DL (ref 14–18)

## 2023-11-05 PROCEDURE — 99233 SBSQ HOSP IP/OBS HIGH 50: CPT | Performed by: HOSPITALIST

## 2023-11-05 PROCEDURE — 36430 TRANSFUSION BLD/BLD COMPNT: CPT

## 2023-11-05 PROCEDURE — A9270 NON-COVERED ITEM OR SERVICE: HCPCS | Performed by: HOSPITALIST

## 2023-11-05 PROCEDURE — 99222 1ST HOSP IP/OBS MODERATE 55: CPT | Performed by: INTERNAL MEDICINE

## 2023-11-05 PROCEDURE — 36415 COLL VENOUS BLD VENIPUNCTURE: CPT

## 2023-11-05 PROCEDURE — 700111 HCHG RX REV CODE 636 W/ 250 OVERRIDE (IP): Mod: JZ,UD | Performed by: HOSPITALIST

## 2023-11-05 PROCEDURE — 86923 COMPATIBILITY TEST ELECTRIC: CPT

## 2023-11-05 PROCEDURE — P9016 RBC LEUKOCYTES REDUCED: HCPCS

## 2023-11-05 PROCEDURE — 770020 HCHG ROOM/CARE - TELE (206)

## 2023-11-05 PROCEDURE — A9270 NON-COVERED ITEM OR SERVICE: HCPCS

## 2023-11-05 PROCEDURE — 96376 TX/PRO/DX INJ SAME DRUG ADON: CPT

## 2023-11-05 PROCEDURE — 700102 HCHG RX REV CODE 250 W/ 637 OVERRIDE(OP): Performed by: HOSPITALIST

## 2023-11-05 PROCEDURE — 85018 HEMOGLOBIN: CPT | Mod: 91

## 2023-11-05 PROCEDURE — 700102 HCHG RX REV CODE 250 W/ 637 OVERRIDE(OP)

## 2023-11-05 PROCEDURE — 85014 HEMATOCRIT: CPT

## 2023-11-05 PROCEDURE — 30233N1 TRANSFUSION OF NONAUTOLOGOUS RED BLOOD CELLS INTO PERIPHERAL VEIN, PERCUTANEOUS APPROACH: ICD-10-PCS | Performed by: HOSPITALIST

## 2023-11-05 RX ORDER — NYSTATIN 100000 [USP'U]/G
POWDER TOPICAL 2 TIMES DAILY
Status: DISCONTINUED | OUTPATIENT
Start: 2023-11-05 | End: 2023-11-07 | Stop reason: HOSPADM

## 2023-11-05 RX ADMIN — DAPAGLIFLOZIN 10 MG: 10 TABLET, FILM COATED ORAL at 05:16

## 2023-11-05 RX ADMIN — 0.12% CHLORHEXIDINE GLUCONATE 15 ML: 1.2 RINSE ORAL at 05:15

## 2023-11-05 RX ADMIN — LEVOTHYROXINE SODIUM 150 MCG: 0.07 TABLET ORAL at 05:16

## 2023-11-05 RX ADMIN — FUROSEMIDE 40 MG: 10 INJECTION, SOLUTION INTRAVENOUS at 05:16

## 2023-11-05 RX ADMIN — ZIPRASIDONE HYDROCHLORIDE 40 MG: 40 CAPSULE ORAL at 19:23

## 2023-11-05 RX ADMIN — ROPINIROLE HYDROCHLORIDE 0.5 MG: 0.5 TABLET, FILM COATED ORAL at 20:39

## 2023-11-05 RX ADMIN — TRAZODONE HYDROCHLORIDE 50 MG: 50 TABLET ORAL at 20:39

## 2023-11-05 RX ADMIN — CLONAZEPAM 0.25 MG: 0.5 TABLET ORAL at 16:49

## 2023-11-05 RX ADMIN — ZIPRASIDONE HYDROCHLORIDE 40 MG: 40 CAPSULE ORAL at 05:16

## 2023-11-05 RX ADMIN — HYDRALAZINE HYDROCHLORIDE 10 MG: 20 INJECTION, SOLUTION INTRAMUSCULAR; INTRAVENOUS at 16:47

## 2023-11-05 RX ADMIN — TAMSULOSIN HYDROCHLORIDE 0.4 MG: 0.4 CAPSULE ORAL at 09:00

## 2023-11-05 RX ADMIN — NYSTATIN: 100000 POWDER TOPICAL at 23:40

## 2023-11-05 RX ADMIN — 0.12% CHLORHEXIDINE GLUCONATE 15 ML: 1.2 RINSE ORAL at 16:50

## 2023-11-05 RX ADMIN — DOXYCYCLINE 100 MG: 100 TABLET, FILM COATED ORAL at 16:50

## 2023-11-05 RX ADMIN — CLONAZEPAM 0.25 MG: 0.5 TABLET ORAL at 05:16

## 2023-11-05 RX ADMIN — DOXYCYCLINE 100 MG: 100 TABLET, FILM COATED ORAL at 05:16

## 2023-11-05 RX ADMIN — PAROXETINE 30 MG: 30 TABLET, FILM COATED ORAL at 05:16

## 2023-11-05 ASSESSMENT — ENCOUNTER SYMPTOMS
DIZZINESS: 0
ABDOMINAL PAIN: 0
HEADACHES: 0
HEMOPTYSIS: 0
BLOOD IN STOOL: 1
HEARTBURN: 0
NAUSEA: 0
VOMITING: 0
COUGH: 0
BLURRED VISION: 0
PHOTOPHOBIA: 0
SPUTUM PRODUCTION: 0
WEAKNESS: 1
CHILLS: 0
DEPRESSION: 0
PALPITATIONS: 0
FEVER: 0
SENSORY CHANGE: 0
HALLUCINATIONS: 0
ORTHOPNEA: 0
MYALGIAS: 1
SPEECH CHANGE: 0

## 2023-11-05 ASSESSMENT — COGNITIVE AND FUNCTIONAL STATUS - GENERAL
TURNING FROM BACK TO SIDE WHILE IN FLAT BAD: A LITTLE
HELP NEEDED FOR BATHING: A LOT
CLIMB 3 TO 5 STEPS WITH RAILING: TOTAL
SUGGESTED CMS G CODE MODIFIER MOBILITY: CL
DRESSING REGULAR LOWER BODY CLOTHING: TOTAL
DAILY ACTIVITIY SCORE: 13
STANDING UP FROM CHAIR USING ARMS: A LOT
TOILETING: A LOT
MOVING TO AND FROM BED TO CHAIR: A LOT
DRESSING REGULAR UPPER BODY CLOTHING: A LOT
WALKING IN HOSPITAL ROOM: TOTAL
EATING MEALS: A LITTLE
MOBILITY SCORE: 11
SUGGESTED CMS G CODE MODIFIER DAILY ACTIVITY: CL
PERSONAL GROOMING: A LITTLE
MOVING FROM LYING ON BACK TO SITTING ON SIDE OF FLAT BED: A LOT

## 2023-11-05 ASSESSMENT — PAIN DESCRIPTION - PAIN TYPE
TYPE: ACUTE PAIN
TYPE: ACUTE PAIN

## 2023-11-05 ASSESSMENT — LIFESTYLE VARIABLES: SUBSTANCE_ABUSE: 0

## 2023-11-05 NOTE — PROGRESS NOTES
Went to change patients brief, found brief fully saturated in bloody watery stool. Charge RN notified. Rapid response called. MD notified, picture of blood stool sent to MD. Vitals stable. 1 unit packed RBC running. Patient assymptomatic.

## 2023-11-05 NOTE — PROGRESS NOTES
Cleaned patient up and repositioned. Patient states he has a mild stomach ache but feels better. VSS. A&Ox4. Resting in bed.

## 2023-11-05 NOTE — PROGRESS NOTES
Rapid Response Summary     Rapid response called at 1402 for: New onset large bloody BM    VS: WDL (See Vitals Flowsheet)  Additional info: Previous oral bleeding. Blood infusing for HGB <7.  MD Paged: Shiv Baeza  Interventions:    Imaging/Tests: N/A   Labs: N/A   Medications: 1 unit RBC infusing, 1 unit RBC ordered for when this unit finishes.    Other: N/A  Disposition: Improved with rapid response team interventions. Primary RN updated on plan of care. Transfer not indicated at this time.  and Rapid team will continue to follow the patient.  CIC RRT RN to continue to follow.

## 2023-11-05 NOTE — CARE PLAN
The patient is Stable - Low risk of patient condition declining or worsening    Shift Goals  Clinical Goals: Monitor H/H, pain control  Patient Goals: Rest  Family Goals: No family present    Progress made toward(s) clinical / shift goals:    Problem: Knowledge Deficit - Standard  Goal: Patient and family/care givers will demonstrate understanding of plan of care, disease process/condition, diagnostic tests and medications  Outcome: Progressing     Problem: Fall Risk  Goal: Patient will remain free from falls  Outcome: Progressing     Problem: Pain - Standard  Goal: Alleviation of pain or a reduction in pain to the patient’s comfort goal  Outcome: Progressing       Patient is not progressing towards the following goals:

## 2023-11-05 NOTE — ASSESSMENT & PLAN NOTE
11/7/2023  Could be related to swallowed blood however patient has never had endoscopy and has had severe anemia requiring blood transfusion it is imperative that we investigate the bowels plus minus stomach for possible bleeding sources    Plan for endoscopy in the next 24 to 48 hours--> dr alegria    Status post EGD 11/6 with no bleeding

## 2023-11-05 NOTE — PROGRESS NOTES
4 Eyes Skin Assessment Completed by ELISA Woodward and ELISA Bartlett.    Head Bruising and Redness  Ears Redness and breakdown behind right ear.  Nose WDL  Mouth Redness  Neck WDL  Breast/Chest WDL  Shoulder Blades WDL  Spine WDL  (R) Arm/Elbow/Hand Bruising  (L) Arm/Elbow/Hand Redness and Bruising  Abdomen Scar from previous ostomy. Redness and moisture right abdominal fold.   Groin Redness  Scrotum/Coccyx/Buttocks Redness, Excoriation, and Moisture Fissure  (R) Leg Swelling and Edema  (L) Leg Swelling and Edema  (R) Heel/Foot/Toe Swelling  (L) Heel/Foot/Toe Swelling          Devices In Places Pulse Ox and Nasal Cannula      Interventions In Place Waffle Overlay, Pillows, and Low Air Loss Mattress    Possible Skin Injury Yes    Pictures Uploaded Into Epic Yes  Wound Consult Placed Yes  RN Wound Prevention Protocol Ordered Yes

## 2023-11-05 NOTE — CARE PLAN
The patient is Watcher - Medium risk of patient condition declining or worsening    Shift Goals  Clinical Goals: Monitor Hgb and vital signs, wound consult  Patient Goals: Rest  Family Goals: Not at bedside    Patient not progressing towards goal of fluid volume status and hemodynamic stability.     Problem: Fall Risk  Goal: Patient will remain free from falls  Outcome: Progressing     Problem: Skin Integrity  Goal: Skin integrity is maintained or improved  Outcome: Not Progressing     Problem: Pain - Standard  Goal: Alleviation of pain or a reduction in pain to the patient’s comfort goal  Outcome: Not Progressing     Problem: Hemodynamics  Goal: Patient's hemodynamics, fluid balance and neurologic status will be stable or improve  Outcome: Not Progressing     Problem: Fluid Volume  Goal: Fluid volume balance will be maintained  Outcome: Not Progressing

## 2023-11-05 NOTE — CONSULTS
Date of Consultation:  11/5/2023    Patient: : Sebastián Castro  MRN: [id number]    Referring Physician:  Dr Maldonado     GI:Handy Pereyra M.D.     Reason for Consultation: Anemia due to GI blood loss, melena    History of Present Illness:   58-year-old male admitted last evening for postoperative bleeding from tooth extraction.  Patient has a history of atrial fibrillation and takes Eliquis.  Endorses epigastric pain.  He has had 2 large of melenic stools with a significantly depressed hemoglobin.  Concern regarding peptic ulcer disease has been raised.  Request for upper endoscopy.  Patient endorses no history of the same.  Last dose of Eliquis was over 3 days ago.      Past Medical History:   Diagnosis Date    Bacteremia 11/8/2020    Abscess 11/8/2020    Sepsis (HCC) 11/8/2020    Longstanding persistent atrial fibrillation (HCC) 11/8/2020    LV dysfunction 11/8/2020    NICM (nonischemic cardiomyopathy) ([facility]) 11/8/2020    A-fib (HCC)     Chest pain     Congestive heart failure (HCC)     Diabetes (HCC)     Diabetic neuropathy (HCC)     Hypercholesterolemia     Hypertension     Morbid obesity (HCC)     Noncompliance     Normal cardiac stress test     Orthostatic hypotension        Past Surgical History:   Procedure Laterality Date    NE EXPLORATORY OF ABDOMEN  5/27/2021    Procedure: LAPAROTOMY, EXPLORATORY;  Surgeon: Kin Desouza D.O.;  Location: [facility];  Service: General    COLOSTOMY TAKEDOWN  5/27/2021    Procedure: COLOSTOMY TAKEDOWN AND CLOSURE;  Surgeon: Kin Desouza D.O.;  Location: [facility];  Service: General    COLECTOMY N/A 11/10/2020    Procedure: COLECTOMY-SEGMENTAL, COLOSTOMY, MOBILIZATION OF SPLENIC FLEXURE, WOUND VAC PLACEMENT, IRRIGATION AND DEBRIDMENT FLANK WOUND;  Surgeon: Kin Desouza D.O.;  Location: [facility];  Service: General    ZZZ CARDIAC CATH  07/21/2018    EF 45%, normal coronaries.    IRRIGATION & DEBRIDEMENT ORTHO Right 2/19/2018    Procedure: IRRIGATION  & DEBRIDEMENT ORTHO-FOOT;  Surgeon: Kirby Lopez M.D.;  Location: [facility];  Service: Orthopedics    TOE AMPUTATION Right 1/17/2018    Procedure: TOE AMPUTATION-1ST RAY;  Surgeon: Doug Delong M.D.;  Location: [facility];  Service: Orthopedics    TOE AMPUTATION Right 11/10/2017    Procedure: TOE AMPUTATION;  Surgeon: Osorio Leo M.D.;  Location: [facility];  Service: Orthopedics       Family History   Problem Relation Age of Onset    No Known Problems Mother     No Known Problems Father        Social History     Socioeconomic History    Marital status:    Tobacco Use    Smoking status: Never    Smokeless tobacco: Never   Vaping Use    Vaping Use: Never used   Substance and Sexual Activity    Alcohol use: No    Drug use: No     Social Determinants of Health     Financial Resource Strain: Low Risk  (10/21/2019)    Overall Financial Resource Strain (CARDIA)     Difficulty of Paying Living Expenses: Not hard at all   Food Insecurity: No Food Insecurity (10/21/2019)    Hunger Vital Sign     Worried About Running Out of Food in the Last Year: Never true     Ran Out of Food in the Last Year: Never true   Transportation Needs: No Transportation Needs (10/21/2019)    PRAPARE - Transportation     Lack of Transportation (Medical): No     Lack of Transportation (Non-Medical): No       Current Meds (name, sig, last dose):     Current Facility-Administered Medications:     clonazePAM    dapagliflozin propanediol    levothyroxine    PARoxetine    ROPINIRole    tamsulosin    traZODone    ziprasidone    furosemide    senna-docusate **AND** polyethylene glycol/lytes **AND** magnesium hydroxide **AND** bisacodyl    acetaminophen    hydrALAZINE    chlorhexidine    doxycycline monohydrate    Pharmacy Consult Request    oxyCODONE immediate-release **OR** oxyCODONE immediate-release **OR** HYDROmorphone      ROS  10 systems reviewed and are negative unless otherwise noted in  history of present illness.    Physical Exam:  Vitals:    11/05/23 0349 11/05/23 0436 11/05/23 0510 11/05/23 0656   BP: 113/58 121/67 131/62 131/64   Pulse: 93 90 87 96   Resp: 16 16 16 16   Temp: 36.1 °C (96.9 °F) 36.6 °C (97.8 °F) 36 °C (96.8 °F) 36.7 °C (98.1 °F)   TempSrc: Temporal Temporal Temporal Temporal   SpO2: 98% 98% 98% 97%   Weight:       Height:           Physical Exam  Constitutional:       General: He is not in acute distress.     Appearance: He is ill-appearing.   HENT:      Mouth/Throat:      Mouth: Mucous membranes are dry.   Eyes:      General: No scleral icterus.  Cardiovascular:      Rate and Rhythm: Normal rate. Rhythm irregular.   Pulmonary:      Breath sounds: Normal breath sounds.   Abdominal:      Palpations: Abdomen is soft.   Skin:     General: Skin is warm and dry.   Neurological:      General: No focal deficit present.      Mental Status: He is alert and oriented to person, place, and time.   Psychiatric:         Behavior: Behavior normal.         Judgment: Judgment normal.           Labs:  Recent Labs     11/04/23  1139   SODIUM 139   POTASSIUM 4.5   CHLORIDE 104   CO2 26   BUN 34*   CREATININE 1.19   CALCIUM 9.1     Recent Labs     11/04/23  1139   ALTSGPT 10   ASTSGOT 20   ALKPHOSPHAT 77   TBILIRUBIN 0.9   GLUCOSE 107*     Recent Labs     11/04/23  1139   WBC 6.3   NEUTSPOLYS 69.10   LYMPHOCYTES 16.60*   MONOCYTES 9.90   EOSINOPHILS 3.50   BASOPHILS 0.60   ASTSGOT 20   ALTSGPT 10   ALKPHOSPHAT 77   TBILIRUBIN 0.9     Recent Labs     11/04/23  1139 11/04/23  2019 11/05/23  0526   RBC 2.66*  --   --    HEMOGLOBIN 7.5* 6.4* 8.4*   HEMATOCRIT 24.9*  --   --    PLATELETCT 178  --   --    PROTHROMBTM 20.9*  --   --    APTT 38.5*  --   --    INR 1.77*  --   --      Recent Results (from the past 24 hour(s))   EKG    Collection Time: 11/04/23 12:21 PM   Result Value Ref Range    Report       [facility] Emergency Dept.    Test Date:  2023-11-04  Pt Name:    EMELY FLORES                 Department: ER  MRN:        [id number]                      Room:       [facility]  Gender:     Male                         Technician: [id number]  :        1965                   Requested By:ER TRIAGE PROTOCOL  Order #:    [id number]                    Reading MD: SUZI DRISCOLL MD    Measurements  Intervals                                Axis  Rate:       94                           P:          0  AZ:         0                            QRS:        -21  QRSD:       100                          T:          74  QT:         380  QTc:        476    Interpretive Statements  Atrial fibrillation  Borderline left axis deviation  Borderline low voltage, extremity leads  Abnormal R-wave progression, late transition  Borderline prolonged QT interval  Compared to ECG 2022 10:09:11  Left posterior fascicular block no longer present  Incomplete right bundle-branch block no margarito eleanor present  Electronically Signed On 2023 12:42:50 PDT by SUZI DRISCOLL MD     PLATELET MAPPING WITH BASIC TEG    Collection Time: 23  1:15 PM   Result Value Ref Range    Reaction Time Initial-R 6.8 4.6 - 9.1 min    React Time Initial Hep 6.3 4.3 - 8.3 min    Clot Kinetics-K 1.3 0.8 - 2.1 min    Clot Angle-Angle 73.1 63.0 - 78.0 degrees    Maximum Clot Strength-MA 55.6 52.0 - 69.0 mm    TEG Functional Fibrinogen(MA) 17.6 15.0 - 32.0 mm    Lysis 30 minutes-LY30 2.5 0.0 - 2.6 %    % Inhibition ADP 0.7 0.0 - 17.0 %    % Inhibition AA 11.9 (H) 0.0 - 11.0 %    TEG Algorithm Link Algorithm    HGB    Collection Time: 23  8:19 PM   Result Value Ref Range    Hemoglobin 6.4 (L) 14.0 - 18.0 g/dL   HGB    Collection Time: 23  5:26 AM   Result Value Ref Range    Hemoglobin 8.4 (L) 14.0 - 18.0 g/dL         Imaging:  EC-ECHOCARDIOGRAM COMPLETE W/O CONT  Transthoracic  Echo Report    Echocardiography Laboratory    CONCLUSIONS  The left ventricular ejection fraction is visually estimated to be 60%.  Right ventricular  systolic pressure is estimated to be 31 mmHg.  Left atrial volume index is 60 mL/sq m.  Moderate aortic insufficiency.    EMELY FLORES  Exam Date:         2022                      11:37  Exam Location:     Inpatient  Priority:          Routine    Ordering Physician:        BACILIO FAIRBANKS  Referring Physician:       [id number]  Sonographer:               Katherine AUSTIN    Age:    57     Gender:    M  MRN:    [id number]  :    1965  BSA:    2.48   Ht (in):    73     Wt (lb):    280  Exam Type:     Complete    Indications:     Atrial Fibrillation, Cardiomyopathy-Dilated  ICD Codes:       427.31  425.4    CPT Codes:       92234    BP:   122    /   76     HR:  Technical Quality:       Technically difficult study -                            adequate information is obtained    MEASUREMENTS  (Male / Female) Normal Values  2D ECHO  LV Diastolic Diameter PLAX        6.1 cm                4.2 - 5.9 / 3.9 - 5.3   cm  LV Systolic Diameter PLAX         4.1 cm                2.1 - 4.0 cm  IVS Diastolic Thickness           1 cm                    LVPW Diastolic Thickness          1.2 cm                  LVOT Diameter                     2 cm                    Estimated LV Ejection Fraction    60 %                    LV Ejection Fraction MOD BP       58.1 %                >= 55  %  LV Ejection Fraction MOD 4C       57.8 %                  LV Ejection Fraction MOD 2C       59.3 %                    DOPPLER  AV Peak Velocity                  1.3 m/s                 AV Peak Gradient                  6.8 mmHg                AV Mean Gradient                  2.9 mmHg                AI Pressure Half Time             294 ms                  LVOT Peak Velocity                0.89 m/s                AV Area Cont Eq vti               2.3 cm2                 MV Velocity Time Integral         35.3 cm                 Mitral E Point Velocity           1.4 m/s                 Mitral E to A Ratio               2.9                      MV Pressure Half Time             54.3 ms                 MV Area PHT                       4.1 cm2                 MV Deceleration Time              131 ms                  TR Peak Velocity                  238 cm/s                  * Indicates values subject to auto-interpretation  LV EF:  60    %    FINDINGS  Left Ventricle  The left ventricle is mildly dilated. Normal left ventricular wall   thickness. Normal left ventricular systolic function. The left   ventricular ejection fraction is visually estimated to be 60%. Normal   regional wall motion. Grade II diastolic dysfunction.    Right Ventricle  The right ventricle is dilated. Normal right ventricular systolic   function.    Right Atrium  Enlarged right atrium. The inferior vena cava is not well visualized.    Left Atrium  Severely dilated left atrium. Left atrial volume index is 60 mL/sq m.    Mitral Valve  Structurally normal mitral valve. No mitral stenosis. Mild mitral   regurgitation.    Aortic Valve  Tricuspid aortic valve. Aortic valve sclerosis without significant   stenosis. Moderate aortic insufficiency. Pressure half time is 294   msec.    Tricuspid Valve  The tricuspid valve is not well visualized. No tricuspid stenosis. Mild   tricuspid regurgitation. Right ventricular systolic pressure is   estimated to be 31 mmHg.    Pulmonic Valve  The pulmonic valve is not well visualized. No stenosis or regurgitation   seen.    Pericardium  No pericardial effusion.    Aorta  The aortic root is dilated with a diameter of 3.8 cm. The ascending   aorta diameter is 3.5 cm.    Presley Camara MD  (Electronically Signed)  Final Date:     23 December 2022                   13:38        MDM Data Review:  -Records reviewed and summarized in current documentation  -I personally reviewed and interpreted the laboratory results  -I personally reviewed the radiology images  -I have personally reviewed medications    Hospital Problem List:  Active Hospital  Problems    Diagnosis     Oral bleeding [K13.79]     S/P tooth extraction [K08.409]     Chronic diastolic heart failure (HCC) [I50.32]     AF (atrial fibrillation) ([facility]) [I48.91]        Impressions:  58-year-old male on Eliquis for atrial fibrillation presents with postoperative bleeding from tooth extraction.  Patient had 2 melenic stools with a severely depressed hemoglobin.  Patient endorses epigastric discomfort.  No history of upper endoscopy.  Risk-benefit reviewed.  Patient would like to proceed forward.    The risk, benefits, and alternatives were discussed in detail. Risks include bowel perforation, procedure related bleeding event, infection, inability to safely complete the exam, sedation related complications. The patient, understanding the discussion, consents to proceed forward.        Recommendations:  N.p.o. after midnight.  EGD in a.m.

## 2023-11-05 NOTE — PROGRESS NOTES
[facility] Medicine Daily Progress Note    Date of Service  11/5/2023    Chief Complaint  Sebastián Castro is a 58 y.o. male admitted 11/4/2023 with oral bleeding    Hospital Course  Sebastián Castro is a 58 y.o. male with past medical history of CHF, A-fib who presented to the emergency department at [facility] With bleeding ongoing for the last 2 days.  He apparently had 2 teeth removed on Thursday but on Friday he noted some bleeding coming from his mouth.  It did not stop so he sought medical care.  Patient has not taken his Eliquis for the last 3 days.  He takes Eliquis for his history of paroxysmal A-fib.  Patient is complaining of some mild mouth pain at the site of where the teeth were extracted     He was initially seen at [facility] ED for this where he was given TXA twice, surgicell and has blood transfused for hemoglobin of 6.9.  I discussed the plan of care with patient.    Interval Problem Update  Since yesterday afternoon patient has had 2 large melanotic stools    His hemoglobin dropped down to 6.4 requiring an additional 2 units of PRBCs which brings him a total total of 3 units of PRBCs during this current event    I have consulted Dr. Pereyra of GI for evaluation for possible endoscopy.    I have discussed this patient's plan of care and discharge plan at IDT rounds today with Case Management, Nursing, Nursing leadership, and other members of the IDT team.    Consultants/Specialty  GI    Code Status  Full Code    Disposition  The patient is not medically cleared for discharge to home or a post-acute facility.  Anticipate discharge to: home with close outpatient follow-up    I have placed the appropriate orders for post-discharge needs.    Review of Systems  Review of Systems   Constitutional:  Positive for malaise/fatigue. Negative for chills and fever.   HENT:  Negative for ear pain, hearing loss and tinnitus.    Eyes:  Negative for blurred vision and photophobia.   Respiratory:  Negative for  cough, hemoptysis and sputum production.    Cardiovascular:  Positive for leg swelling. Negative for chest pain, palpitations and orthopnea.   Gastrointestinal:  Positive for blood in stool and melena. Negative for abdominal pain, heartburn, nausea and vomiting.   Genitourinary:  Negative for dysuria, frequency and urgency.   Musculoskeletal:  Positive for joint pain and myalgias.   Skin:  Negative for itching and rash.   Neurological:  Positive for weakness. Negative for dizziness, sensory change, speech change and headaches.   Psychiatric/Behavioral:  Negative for depression, hallucinations, substance abuse and suicidal ideas.         Physical Exam  Temp:  [35.8 °C (96.4 °F)-37 °C (98.6 °F)] 36.3 °C (97.3 °F)  Pulse:  [] 99  Resp:  [15-24] 16  BP: ()/(52-69) 130/62  SpO2:  [94 %-99 %] 98 %    Physical Exam  Constitutional:       General: He is not in acute distress.     Appearance: He is not ill-appearing, toxic-appearing or diaphoretic.   HENT:      Head: Normocephalic.      Nose: Nose normal.      Mouth/Throat:      Pharynx: No oropharyngeal exudate.   Eyes:      General: No scleral icterus.        Right eye: No discharge.         Left eye: No discharge.      Extraocular Movements: Extraocular movements intact.      Pupils: Pupils are equal, round, and reactive to light.   Cardiovascular:      Rate and Rhythm: Normal rate and regular rhythm.      Heart sounds: No murmur heard.     No gallop.   Pulmonary:      Effort: Pulmonary effort is normal. No respiratory distress.      Breath sounds: No stridor. No wheezing or rhonchi.   Abdominal:      General: There is no distension.      Palpations: There is no mass.      Tenderness: There is no abdominal tenderness.      Hernia: No hernia is present.   Musculoskeletal:         General: No swelling, tenderness, deformity or signs of injury. Normal range of motion.      Cervical back: Normal range of motion.   Skin:     Capillary Refill: Capillary refill takes  2 to 3 seconds.      Coloration: Skin is pale. Skin is not jaundiced.      Findings: No bruising.   Neurological:      General: No focal deficit present.      Mental Status: He is oriented to person, place, and time.      Cranial Nerves: No cranial nerve deficit.      Sensory: No sensory deficit.      Motor: Weakness present.      Deep Tendon Reflexes: Reflexes normal.   Psychiatric:         Mood and Affect: Mood normal.         Behavior: Behavior normal.         Fluids    Intake/Output Summary (Last 24 hours) at 11/5/2023 1223  Last data filed at 11/5/2023 1000  Gross per 24 hour   Intake 395.83 ml   Output 4600 ml   Net -4204.17 ml       Laboratory  Recent Labs     11/04/23  1139 11/04/23 2019 11/05/23  0526   WBC 6.3  --   --    RBC 2.66*  --   --    HEMOGLOBIN 7.5* 6.4* 8.4*   HEMATOCRIT 24.9*  --   --    MCV 93.6  --   --    MCH 28.2  --   --    MCHC 30.1*  --   --    RDW 53.1*  --   --    PLATELETCT 178  --   --    MPV 11.2  --   --      Recent Labs     11/04/23  1139   SODIUM 139   POTASSIUM 4.5   CHLORIDE 104   CO2 26   GLUCOSE 107*   BUN 34*   CREATININE 1.19   CALCIUM 9.1     Recent Labs     11/04/23  1139   APTT 38.5*   INR 1.77*               Imaging  No orders to display        Assessment/Plan  * Oral bleeding- (present on admission)  Assessment & Plan  Related to Xarelto in the setting of tooth extractions.    Monitor and supportive care and transfuse PRBCs if hemoglobin is less than 7    Dr. Giang oral surgery is available if needed    Melanotic stools- (present on admission)  Assessment & Plan  Could be related to swallowed blood however patient has never had endoscopy and has had severe anemia requiring blood transfusion it is imperative that we investigate the bowels plus minus stomach for possible bleeding sources      Plan for endoscopy in the next 24 to 48 hours--> dr alegria    S/P tooth extraction- (present on admission)  Assessment & Plan  Antibiotic doxycycline and p.o. Peridex  mouthwash    Chronic diastolic heart failure (HCC)- (present on admission)  Assessment & Plan  Diuretics have been held on 11/5/2023 as we saw effective diuresis on 11/4/2023 and patient is currently dealing with bleeding episodes we do not want patient become hypovolemic  Not in acute exacerbation    AF (atrial fibrillation) (HCC)- (present on admission)  Assessment & Plan  Rate controlled and holding eliquis due to active bleeding         VTE prophylaxis:   SCDs/TEDs      I have performed a physical exam and reviewed and updated ROS and Plan today (11/5/2023). In review of yesterday's note (11/4/2023), there are no changes except as documented above.

## 2023-11-05 NOTE — PROGRESS NOTES
Received report from day shift RN. Assumed patient care. Patient resting in bed, NAD, A&O x4 , sleeping but easily arousable. Patient states some moderate pain in his jaw. POC discussed with patient, all questions addressed. Call light within reach.

## 2023-11-05 NOTE — PROGRESS NOTES
One hour later (time change due to day light savings time) at 0128 started second unit of PRBCs. Utilizing down time documentation form with second RN.     Prior to start vitals at 0124- 96.5 F, 97, 18, 114/63, 98% 2 L NC    Started blood at 0128 at rate 75 mL/hr    15 minutes- rate change to 125 mL/hr, 96.6 F, 93, 18, 123/56. No signs of reaction.

## 2023-11-05 NOTE — PROGRESS NOTES
Report received from ELISA Palomino.  Assumed care of patient.  Assessment complete.  Plan of care gone over with the patient and all concerns addressed.  Patient resting in bed.  Patient A & O x 4.  No apparent signs of distress.  Safety precautions in place.  Patient educated to call for assistance.  Hourly rounding in place.

## 2023-11-05 NOTE — HOSPITAL COURSE
Sebastián Castro is a 58 y.o. male with past medical history of CHF, A-fib who presented to the emergency department at [facility] With bleeding ongoing for the last 2 days.  He apparently had 2 teeth removed on Thursday but on Friday he noted some bleeding coming from his mouth.  It did not stop so he sought medical care.  Patient has not taken his Eliquis for the last 3 days.  He takes Eliquis for his history of paroxysmal A-fib.  Patient is complaining of some mild mouth pain at the site of where the teeth were extracted     He was initially seen at [facility] ED for this where he was given TXA twice, surgicell and has blood transfused for hemoglobin of 6.9.  With significant antibiotic allergies patient was started on doxycycline as well as chlorhexidine with recent tooth extraction.    Patient did develop melanotic stools and GI was consulted.  Status post EGD in 11/6 which showed only mild gastritis.  Melena likely secondary to swallowing blood from acute extraction.    C. difficile and stool cultures were negative.    Patient was transfused 1 unit of packed red blood cells on 11/4, but otherwise his hemoglobin has remained stable.    Medically stable to discharge to [facility] nursing [facility].  Follow-up with primary care, dentist as outpatient.

## 2023-11-05 NOTE — H&P (VIEW-ONLY)
Date of Consultation:  11/5/2023    Patient: : Sebastián Castro  MRN: [id number]    Referring Physician:  Dr Maldonado     GI:Handy Pereyra M.D.     Reason for Consultation: Anemia due to GI blood loss, melena    History of Present Illness:   58-year-old male admitted last evening for postoperative bleeding from tooth extraction.  Patient has a history of atrial fibrillation and takes Eliquis.  Endorses epigastric pain.  He has had 2 large of melenic stools with a significantly depressed hemoglobin.  Concern regarding peptic ulcer disease has been raised.  Request for upper endoscopy.  Patient endorses no history of the same.  Last dose of Eliquis was over 3 days ago.      Past Medical History:   Diagnosis Date    Bacteremia 11/8/2020    Abscess 11/8/2020    Sepsis (HCC) 11/8/2020    Longstanding persistent atrial fibrillation (HCC) 11/8/2020    LV dysfunction 11/8/2020    NICM (nonischemic cardiomyopathy) ([facility]) 11/8/2020    A-fib (HCC)     Chest pain     Congestive heart failure (HCC)     Diabetes (HCC)     Diabetic neuropathy (HCC)     Hypercholesterolemia     Hypertension     Morbid obesity (HCC)     Noncompliance     Normal cardiac stress test     Orthostatic hypotension        Past Surgical History:   Procedure Laterality Date    OR EXPLORATORY OF ABDOMEN  5/27/2021    Procedure: LAPAROTOMY, EXPLORATORY;  Surgeon: Kin Desouza D.O.;  Location: [facility];  Service: General    COLOSTOMY TAKEDOWN  5/27/2021    Procedure: COLOSTOMY TAKEDOWN AND CLOSURE;  Surgeon: Kin Desouza D.O.;  Location: [facility];  Service: General    COLECTOMY N/A 11/10/2020    Procedure: COLECTOMY-SEGMENTAL, COLOSTOMY, MOBILIZATION OF SPLENIC FLEXURE, WOUND VAC PLACEMENT, IRRIGATION AND DEBRIDMENT FLANK WOUND;  Surgeon: Kin Desouza D.O.;  Location: [facility];  Service: General    ZZZ CARDIAC CATH  07/21/2018    EF 45%, normal coronaries.    IRRIGATION & DEBRIDEMENT ORTHO Right 2/19/2018    Procedure: IRRIGATION  & DEBRIDEMENT ORTHO-FOOT;  Surgeon: Kirby Lopez M.D.;  Location: [facility];  Service: Orthopedics    TOE AMPUTATION Right 1/17/2018    Procedure: TOE AMPUTATION-1ST RAY;  Surgeon: Doug Delong M.D.;  Location: [facility];  Service: Orthopedics    TOE AMPUTATION Right 11/10/2017    Procedure: TOE AMPUTATION;  Surgeon: Osorio Leo M.D.;  Location: [facility];  Service: Orthopedics       Family History   Problem Relation Age of Onset    No Known Problems Mother     No Known Problems Father        Social History     Socioeconomic History    Marital status:    Tobacco Use    Smoking status: Never    Smokeless tobacco: Never   Vaping Use    Vaping Use: Never used   Substance and Sexual Activity    Alcohol use: No    Drug use: No     Social Determinants of Health     Financial Resource Strain: Low Risk  (10/21/2019)    Overall Financial Resource Strain (CARDIA)     Difficulty of Paying Living Expenses: Not hard at all   Food Insecurity: No Food Insecurity (10/21/2019)    Hunger Vital Sign     Worried About Running Out of Food in the Last Year: Never true     Ran Out of Food in the Last Year: Never true   Transportation Needs: No Transportation Needs (10/21/2019)    PRAPARE - Transportation     Lack of Transportation (Medical): No     Lack of Transportation (Non-Medical): No       Current Meds (name, sig, last dose):     Current Facility-Administered Medications:     clonazePAM    dapagliflozin propanediol    levothyroxine    PARoxetine    ROPINIRole    tamsulosin    traZODone    ziprasidone    furosemide    senna-docusate **AND** polyethylene glycol/lytes **AND** magnesium hydroxide **AND** bisacodyl    acetaminophen    hydrALAZINE    chlorhexidine    doxycycline monohydrate    Pharmacy Consult Request    oxyCODONE immediate-release **OR** oxyCODONE immediate-release **OR** HYDROmorphone      ROS  10 systems reviewed and are negative unless otherwise noted in  history of present illness.    Physical Exam:  Vitals:    11/05/23 0349 11/05/23 0436 11/05/23 0510 11/05/23 0656   BP: 113/58 121/67 131/62 131/64   Pulse: 93 90 87 96   Resp: 16 16 16 16   Temp: 36.1 °C (96.9 °F) 36.6 °C (97.8 °F) 36 °C (96.8 °F) 36.7 °C (98.1 °F)   TempSrc: Temporal Temporal Temporal Temporal   SpO2: 98% 98% 98% 97%   Weight:       Height:           Physical Exam  Constitutional:       General: He is not in acute distress.     Appearance: He is ill-appearing.   HENT:      Mouth/Throat:      Mouth: Mucous membranes are dry.   Eyes:      General: No scleral icterus.  Cardiovascular:      Rate and Rhythm: Normal rate. Rhythm irregular.   Pulmonary:      Breath sounds: Normal breath sounds.   Abdominal:      Palpations: Abdomen is soft.   Skin:     General: Skin is warm and dry.   Neurological:      General: No focal deficit present.      Mental Status: He is alert and oriented to person, place, and time.   Psychiatric:         Behavior: Behavior normal.         Judgment: Judgment normal.           Labs:  Recent Labs     11/04/23  1139   SODIUM 139   POTASSIUM 4.5   CHLORIDE 104   CO2 26   BUN 34*   CREATININE 1.19   CALCIUM 9.1     Recent Labs     11/04/23  1139   ALTSGPT 10   ASTSGOT 20   ALKPHOSPHAT 77   TBILIRUBIN 0.9   GLUCOSE 107*     Recent Labs     11/04/23  1139   WBC 6.3   NEUTSPOLYS 69.10   LYMPHOCYTES 16.60*   MONOCYTES 9.90   EOSINOPHILS 3.50   BASOPHILS 0.60   ASTSGOT 20   ALTSGPT 10   ALKPHOSPHAT 77   TBILIRUBIN 0.9     Recent Labs     11/04/23  1139 11/04/23  2019 11/05/23  0526   RBC 2.66*  --   --    HEMOGLOBIN 7.5* 6.4* 8.4*   HEMATOCRIT 24.9*  --   --    PLATELETCT 178  --   --    PROTHROMBTM 20.9*  --   --    APTT 38.5*  --   --    INR 1.77*  --   --      Recent Results (from the past 24 hour(s))   EKG    Collection Time: 11/04/23 12:21 PM   Result Value Ref Range    Report       [facility] Emergency Dept.    Test Date:  2023-11-04  Pt Name:    EMELY FLORES                 Department: ER  MRN:        [id number]                      Room:       [facility]  Gender:     Male                         Technician: [id number]  :        1965                   Requested By:ER TRIAGE PROTOCOL  Order #:    [id number]                    Reading MD: SUZI DRISCOLL MD    Measurements  Intervals                                Axis  Rate:       94                           P:          0  UT:         0                            QRS:        -21  QRSD:       100                          T:          74  QT:         380  QTc:        476    Interpretive Statements  Atrial fibrillation  Borderline left axis deviation  Borderline low voltage, extremity leads  Abnormal R-wave progression, late transition  Borderline prolonged QT interval  Compared to ECG 2022 10:09:11  Left posterior fascicular block no longer present  Incomplete right bundle-branch block no margarito eleanor present  Electronically Signed On 2023 12:42:50 PDT by SUZI DRISCOLL MD     PLATELET MAPPING WITH BASIC TEG    Collection Time: 23  1:15 PM   Result Value Ref Range    Reaction Time Initial-R 6.8 4.6 - 9.1 min    React Time Initial Hep 6.3 4.3 - 8.3 min    Clot Kinetics-K 1.3 0.8 - 2.1 min    Clot Angle-Angle 73.1 63.0 - 78.0 degrees    Maximum Clot Strength-MA 55.6 52.0 - 69.0 mm    TEG Functional Fibrinogen(MA) 17.6 15.0 - 32.0 mm    Lysis 30 minutes-LY30 2.5 0.0 - 2.6 %    % Inhibition ADP 0.7 0.0 - 17.0 %    % Inhibition AA 11.9 (H) 0.0 - 11.0 %    TEG Algorithm Link Algorithm    HGB    Collection Time: 23  8:19 PM   Result Value Ref Range    Hemoglobin 6.4 (L) 14.0 - 18.0 g/dL   HGB    Collection Time: 23  5:26 AM   Result Value Ref Range    Hemoglobin 8.4 (L) 14.0 - 18.0 g/dL         Imaging:  EC-ECHOCARDIOGRAM COMPLETE W/O CONT  Transthoracic  Echo Report    Echocardiography Laboratory    CONCLUSIONS  The left ventricular ejection fraction is visually estimated to be 60%.  Right ventricular  systolic pressure is estimated to be 31 mmHg.  Left atrial volume index is 60 mL/sq m.  Moderate aortic insufficiency.    EMELY FLORES  Exam Date:         2022                      11:37  Exam Location:     Inpatient  Priority:          Routine    Ordering Physician:        BACILIO FAIRBANKS  Referring Physician:       [id number]  Sonographer:               Katherine AUSTIN    Age:    57     Gender:    M  MRN:    [id number]  :    1965  BSA:    2.48   Ht (in):    73     Wt (lb):    280  Exam Type:     Complete    Indications:     Atrial Fibrillation, Cardiomyopathy-Dilated  ICD Codes:       427.31  425.4    CPT Codes:       21774    BP:   122    /   76     HR:  Technical Quality:       Technically difficult study -                            adequate information is obtained    MEASUREMENTS  (Male / Female) Normal Values  2D ECHO  LV Diastolic Diameter PLAX        6.1 cm                4.2 - 5.9 / 3.9 - 5.3   cm  LV Systolic Diameter PLAX         4.1 cm                2.1 - 4.0 cm  IVS Diastolic Thickness           1 cm                    LVPW Diastolic Thickness          1.2 cm                  LVOT Diameter                     2 cm                    Estimated LV Ejection Fraction    60 %                    LV Ejection Fraction MOD BP       58.1 %                >= 55  %  LV Ejection Fraction MOD 4C       57.8 %                  LV Ejection Fraction MOD 2C       59.3 %                    DOPPLER  AV Peak Velocity                  1.3 m/s                 AV Peak Gradient                  6.8 mmHg                AV Mean Gradient                  2.9 mmHg                AI Pressure Half Time             294 ms                  LVOT Peak Velocity                0.89 m/s                AV Area Cont Eq vti               2.3 cm2                 MV Velocity Time Integral         35.3 cm                 Mitral E Point Velocity           1.4 m/s                 Mitral E to A Ratio               2.9                      MV Pressure Half Time             54.3 ms                 MV Area PHT                       4.1 cm2                 MV Deceleration Time              131 ms                  TR Peak Velocity                  238 cm/s                  * Indicates values subject to auto-interpretation  LV EF:  60    %    FINDINGS  Left Ventricle  The left ventricle is mildly dilated. Normal left ventricular wall   thickness. Normal left ventricular systolic function. The left   ventricular ejection fraction is visually estimated to be 60%. Normal   regional wall motion. Grade II diastolic dysfunction.    Right Ventricle  The right ventricle is dilated. Normal right ventricular systolic   function.    Right Atrium  Enlarged right atrium. The inferior vena cava is not well visualized.    Left Atrium  Severely dilated left atrium. Left atrial volume index is 60 mL/sq m.    Mitral Valve  Structurally normal mitral valve. No mitral stenosis. Mild mitral   regurgitation.    Aortic Valve  Tricuspid aortic valve. Aortic valve sclerosis without significant   stenosis. Moderate aortic insufficiency. Pressure half time is 294   msec.    Tricuspid Valve  The tricuspid valve is not well visualized. No tricuspid stenosis. Mild   tricuspid regurgitation. Right ventricular systolic pressure is   estimated to be 31 mmHg.    Pulmonic Valve  The pulmonic valve is not well visualized. No stenosis or regurgitation   seen.    Pericardium  No pericardial effusion.    Aorta  The aortic root is dilated with a diameter of 3.8 cm. The ascending   aorta diameter is 3.5 cm.    Presley Camara MD  (Electronically Signed)  Final Date:     23 December 2022                   13:38        MDM Data Review:  -Records reviewed and summarized in current documentation  -I personally reviewed and interpreted the laboratory results  -I personally reviewed the radiology images  -I have personally reviewed medications    Hospital Problem List:  Active Hospital  Problems    Diagnosis     Oral bleeding [K13.79]     S/P tooth extraction [K08.409]     Chronic diastolic heart failure (HCC) [I50.32]     AF (atrial fibrillation) ([facility]) [I48.91]        Impressions:  58-year-old male on Eliquis for atrial fibrillation presents with postoperative bleeding from tooth extraction.  Patient had 2 melenic stools with a severely depressed hemoglobin.  Patient endorses epigastric discomfort.  No history of upper endoscopy.  Risk-benefit reviewed.  Patient would like to proceed forward.    The risk, benefits, and alternatives were discussed in detail. Risks include bowel perforation, procedure related bleeding event, infection, inability to safely complete the exam, sedation related complications. The patient, understanding the discussion, consents to proceed forward.        Recommendations:  N.p.o. after midnight.  EGD in a.m.

## 2023-11-06 ENCOUNTER — ANESTHESIA EVENT (OUTPATIENT)
Dept: SURGERY | Facility: MEDICAL CENTER | Age: 58
DRG: 920 | End: 2023-11-06
Payer: MEDICARE

## 2023-11-06 ENCOUNTER — ANESTHESIA (OUTPATIENT)
Dept: SURGERY | Facility: MEDICAL CENTER | Age: 58
DRG: 920 | End: 2023-11-06
Payer: MEDICARE

## 2023-11-06 LAB
GLUCOSE BLD STRIP.AUTO-MCNC: 80 MG/DL (ref 65–99)
HGB BLD-MCNC: 7.4 G/DL (ref 14–18)
HGB BLD-MCNC: 7.6 G/DL (ref 14–18)
HGB BLD-MCNC: 7.7 G/DL (ref 14–18)
HGB BLD-MCNC: 8.1 G/DL (ref 14–18)

## 2023-11-06 PROCEDURE — 160002 HCHG RECOVERY MINUTES (STAT): Performed by: INTERNAL MEDICINE

## 2023-11-06 PROCEDURE — 85018 HEMOGLOBIN: CPT | Mod: 91

## 2023-11-06 PROCEDURE — A9270 NON-COVERED ITEM OR SERVICE: HCPCS | Performed by: HOSPITALIST

## 2023-11-06 PROCEDURE — 700111 HCHG RX REV CODE 636 W/ 250 OVERRIDE (IP): Performed by: STUDENT IN AN ORGANIZED HEALTH CARE EDUCATION/TRAINING PROGRAM

## 2023-11-06 PROCEDURE — 82962 GLUCOSE BLOOD TEST: CPT

## 2023-11-06 PROCEDURE — 770001 HCHG ROOM/CARE - MED/SURG/GYN PRIV*

## 2023-11-06 PROCEDURE — 700105 HCHG RX REV CODE 258: Performed by: STUDENT IN AN ORGANIZED HEALTH CARE EDUCATION/TRAINING PROGRAM

## 2023-11-06 PROCEDURE — 700101 HCHG RX REV CODE 250: Performed by: STUDENT IN AN ORGANIZED HEALTH CARE EDUCATION/TRAINING PROGRAM

## 2023-11-06 PROCEDURE — 160048 HCHG OR STATISTICAL LEVEL 1-5: Performed by: INTERNAL MEDICINE

## 2023-11-06 PROCEDURE — 36415 COLL VENOUS BLD VENIPUNCTURE: CPT

## 2023-11-06 PROCEDURE — 99232 SBSQ HOSP IP/OBS MODERATE 35: CPT | Performed by: STUDENT IN AN ORGANIZED HEALTH CARE EDUCATION/TRAINING PROGRAM

## 2023-11-06 PROCEDURE — 160035 HCHG PACU - 1ST 60 MINS PHASE I: Performed by: INTERNAL MEDICINE

## 2023-11-06 PROCEDURE — 160202 HCHG ENDO MINUTES - 1ST 30 MINS LEVEL 3: Performed by: INTERNAL MEDICINE

## 2023-11-06 PROCEDURE — 0DJ08ZZ INSPECTION OF UPPER INTESTINAL TRACT, VIA NATURAL OR ARTIFICIAL OPENING ENDOSCOPIC: ICD-10-PCS | Performed by: INTERNAL MEDICINE

## 2023-11-06 PROCEDURE — 43235 EGD DIAGNOSTIC BRUSH WASH: CPT | Performed by: INTERNAL MEDICINE

## 2023-11-06 PROCEDURE — 700102 HCHG RX REV CODE 250 W/ 637 OVERRIDE(OP): Performed by: HOSPITALIST

## 2023-11-06 PROCEDURE — 160009 HCHG ANES TIME/MIN: Performed by: INTERNAL MEDICINE

## 2023-11-06 PROCEDURE — 97602 WOUND(S) CARE NON-SELECTIVE: CPT

## 2023-11-06 RX ORDER — HYDROMORPHONE HYDROCHLORIDE 1 MG/ML
0.1 INJECTION, SOLUTION INTRAMUSCULAR; INTRAVENOUS; SUBCUTANEOUS
Status: DISCONTINUED | OUTPATIENT
Start: 2023-11-06 | End: 2023-11-06

## 2023-11-06 RX ORDER — EPHEDRINE SULFATE 50 MG/ML
5 INJECTION, SOLUTION INTRAVENOUS
Status: DISCONTINUED | OUTPATIENT
Start: 2023-11-06 | End: 2023-11-06

## 2023-11-06 RX ORDER — OXYCODONE HCL 5 MG/5 ML
5 SOLUTION, ORAL ORAL
Status: DISCONTINUED | OUTPATIENT
Start: 2023-11-06 | End: 2023-11-06

## 2023-11-06 RX ORDER — HYDRALAZINE HYDROCHLORIDE 20 MG/ML
5 INJECTION INTRAMUSCULAR; INTRAVENOUS
Status: DISCONTINUED | OUTPATIENT
Start: 2023-11-06 | End: 2023-11-06

## 2023-11-06 RX ORDER — SODIUM CHLORIDE, SODIUM LACTATE, POTASSIUM CHLORIDE, CALCIUM CHLORIDE 600; 310; 30; 20 MG/100ML; MG/100ML; MG/100ML; MG/100ML
INJECTION, SOLUTION INTRAVENOUS CONTINUOUS
Status: ACTIVE | OUTPATIENT
Start: 2023-11-06 | End: 2023-11-06

## 2023-11-06 RX ORDER — DIPHENHYDRAMINE HYDROCHLORIDE 50 MG/ML
12.5 INJECTION INTRAMUSCULAR; INTRAVENOUS
Status: DISCONTINUED | OUTPATIENT
Start: 2023-11-06 | End: 2023-11-06

## 2023-11-06 RX ORDER — SODIUM CHLORIDE, SODIUM LACTATE, POTASSIUM CHLORIDE, CALCIUM CHLORIDE 600; 310; 30; 20 MG/100ML; MG/100ML; MG/100ML; MG/100ML
INJECTION, SOLUTION INTRAVENOUS CONTINUOUS
Status: DISCONTINUED | OUTPATIENT
Start: 2023-11-06 | End: 2023-11-06

## 2023-11-06 RX ORDER — ONDANSETRON 2 MG/ML
4 INJECTION INTRAMUSCULAR; INTRAVENOUS
Status: DISCONTINUED | OUTPATIENT
Start: 2023-11-06 | End: 2023-11-06

## 2023-11-06 RX ORDER — HYDROMORPHONE HYDROCHLORIDE 1 MG/ML
0.2 INJECTION, SOLUTION INTRAMUSCULAR; INTRAVENOUS; SUBCUTANEOUS
Status: DISCONTINUED | OUTPATIENT
Start: 2023-11-06 | End: 2023-11-06

## 2023-11-06 RX ORDER — OXYCODONE HCL 5 MG/5 ML
10 SOLUTION, ORAL ORAL
Status: DISCONTINUED | OUTPATIENT
Start: 2023-11-06 | End: 2023-11-06

## 2023-11-06 RX ORDER — HYDROMORPHONE HYDROCHLORIDE 1 MG/ML
0.4 INJECTION, SOLUTION INTRAMUSCULAR; INTRAVENOUS; SUBCUTANEOUS
Status: DISCONTINUED | OUTPATIENT
Start: 2023-11-06 | End: 2023-11-06

## 2023-11-06 RX ORDER — LIDOCAINE HYDROCHLORIDE 20 MG/ML
INJECTION, SOLUTION EPIDURAL; INFILTRATION; INTRACAUDAL; PERINEURAL PRN
Status: DISCONTINUED | OUTPATIENT
Start: 2023-11-06 | End: 2023-11-06 | Stop reason: SURG

## 2023-11-06 RX ORDER — HALOPERIDOL 5 MG/ML
1 INJECTION INTRAMUSCULAR
Status: DISCONTINUED | OUTPATIENT
Start: 2023-11-06 | End: 2023-11-06

## 2023-11-06 RX ORDER — SODIUM CHLORIDE, SODIUM LACTATE, POTASSIUM CHLORIDE, CALCIUM CHLORIDE 600; 310; 30; 20 MG/100ML; MG/100ML; MG/100ML; MG/100ML
INJECTION, SOLUTION INTRAVENOUS
Status: DISCONTINUED | OUTPATIENT
Start: 2023-11-06 | End: 2023-11-06 | Stop reason: SURG

## 2023-11-06 RX ADMIN — NYSTATIN: 100000 POWDER TOPICAL at 05:23

## 2023-11-06 RX ADMIN — NYSTATIN: 100000 POWDER TOPICAL at 16:58

## 2023-11-06 RX ADMIN — SODIUM CHLORIDE, POTASSIUM CHLORIDE, SODIUM LACTATE AND CALCIUM CHLORIDE: 600; 310; 30; 20 INJECTION, SOLUTION INTRAVENOUS at 14:14

## 2023-11-06 RX ADMIN — PROPOFOL 20 MG: 10 INJECTION, EMULSION INTRAVENOUS at 14:20

## 2023-11-06 RX ADMIN — OXYCODONE HYDROCHLORIDE 10 MG: 10 TABLET ORAL at 16:58

## 2023-11-06 RX ADMIN — OXYCODONE HYDROCHLORIDE 10 MG: 10 TABLET ORAL at 07:59

## 2023-11-06 RX ADMIN — PROPOFOL 20 MG: 10 INJECTION, EMULSION INTRAVENOUS at 14:18

## 2023-11-06 RX ADMIN — ROPINIROLE HYDROCHLORIDE 0.5 MG: 0.5 TABLET, FILM COATED ORAL at 21:16

## 2023-11-06 RX ADMIN — LIDOCAINE HYDROCHLORIDE 40 MG: 20 INJECTION, SOLUTION EPIDURAL; INFILTRATION; INTRACAUDAL at 14:16

## 2023-11-06 RX ADMIN — DOXYCYCLINE 100 MG: 100 TABLET, FILM COATED ORAL at 16:57

## 2023-11-06 RX ADMIN — PROPOFOL 20 MG: 10 INJECTION, EMULSION INTRAVENOUS at 14:22

## 2023-11-06 RX ADMIN — TAMSULOSIN HYDROCHLORIDE 0.4 MG: 0.4 CAPSULE ORAL at 07:56

## 2023-11-06 RX ADMIN — TRAZODONE HYDROCHLORIDE 50 MG: 50 TABLET ORAL at 21:15

## 2023-11-06 RX ADMIN — DAPAGLIFLOZIN 10 MG: 10 TABLET, FILM COATED ORAL at 05:22

## 2023-11-06 RX ADMIN — ZIPRASIDONE HYDROCHLORIDE 40 MG: 40 CAPSULE ORAL at 16:57

## 2023-11-06 RX ADMIN — DOXYCYCLINE 100 MG: 100 TABLET, FILM COATED ORAL at 05:22

## 2023-11-06 RX ADMIN — OXYCODONE HYDROCHLORIDE 10 MG: 10 TABLET ORAL at 21:16

## 2023-11-06 RX ADMIN — CLONAZEPAM 0.25 MG: 0.5 TABLET ORAL at 16:57

## 2023-11-06 RX ADMIN — ZIPRASIDONE HYDROCHLORIDE 40 MG: 40 CAPSULE ORAL at 05:22

## 2023-11-06 RX ADMIN — 0.12% CHLORHEXIDINE GLUCONATE 15 ML: 1.2 RINSE ORAL at 05:22

## 2023-11-06 RX ADMIN — LEVOTHYROXINE SODIUM 150 MCG: 0.07 TABLET ORAL at 05:21

## 2023-11-06 RX ADMIN — PAROXETINE 30 MG: 30 TABLET, FILM COATED ORAL at 05:22

## 2023-11-06 RX ADMIN — PROPOFOL 80 MG: 10 INJECTION, EMULSION INTRAVENOUS at 14:16

## 2023-11-06 RX ADMIN — CLONAZEPAM 0.25 MG: 0.5 TABLET ORAL at 05:21

## 2023-11-06 RX ADMIN — 0.12% CHLORHEXIDINE GLUCONATE 15 ML: 1.2 RINSE ORAL at 16:57

## 2023-11-06 ASSESSMENT — FIBROSIS 4 INDEX: FIB4 SCORE: 2.13

## 2023-11-06 ASSESSMENT — PAIN DESCRIPTION - PAIN TYPE
TYPE: ACUTE PAIN
TYPE: SURGICAL PAIN
TYPE: SURGICAL PAIN
TYPE: ACUTE PAIN
TYPE: ACUTE PAIN
TYPE: SURGICAL PAIN

## 2023-11-06 ASSESSMENT — PAIN SCALES - GENERAL: PAIN_LEVEL: 1

## 2023-11-06 NOTE — DISCHARGE PLANNING
0936  KALIA sent referral to [facility] @ 0936 per LUNA Morelos.     0783  Agency/Facility Name: [facility]   Spoke To: Cat  Outcome: DPA called to notify Pt to be discharged tomorrow, and if pt can be accepted to facility tomorrow. Cat requesting transport to be set up at 1100.     LUNA Morelos notified.

## 2023-11-06 NOTE — CARE PLAN
The patient is Watcher - Medium risk of patient condition declining or worsening    Shift Goals  Clinical Goals: monitor H&H,  Patient Goals: rest  Family Goals: Not at bedside    Progress made toward(s) clinical / shift goals:  yes    Problem: Knowledge Deficit - Standard  Goal: Patient and family/care givers will demonstrate understanding of plan of care, disease process/condition, diagnostic tests and medications  Outcome: Progressing     Problem: Skin Integrity  Goal: Skin integrity is maintained or improved  11/6/2023 0219 by Margot Shetty, R.N.  Outcome: Progressing  11/6/2023 0100 by Margot Shetty R.N.  Outcome: Progressing     Problem: Fall Risk  Goal: Patient will remain free from falls  11/6/2023 0219 by Margot Shetty, R.N.  Outcome: Progressing  11/6/2023 0100 by Margot Shetty, R.N.  Outcome: Progressing     Problem: Hemodynamics  Goal: Patient's hemodynamics, fluid balance and neurologic status will be stable or improve  11/6/2023 0219 by Margot Shetty, R.N.  Outcome: Progressing  11/6/2023 0100 by Margot Shetty, R.N.  Outcome: Progressing       Patient is not progressing towards the following goals:

## 2023-11-06 NOTE — CARE PLAN
Problem: Knowledge Deficit - Standard  Goal: Patient and family/care givers will demonstrate understanding of plan of care, disease process/condition, diagnostic tests and medications  Outcome: Progressing     Problem: Skin Integrity  Goal: Skin integrity is maintained or improved  Outcome: Progressing     Problem: Pain - Standard  Goal: Alleviation of pain or a reduction in pain to the patient’s comfort goal  Outcome: Progressing     Problem: Hemodynamics  Goal: Patient's hemodynamics, fluid balance and neurologic status will be stable or improve  Outcome: Progressing     Problem: Fluid Volume  Goal: Fluid volume balance will be maintained  Outcome: Progressing   The patient is Stable - Low risk of patient condition declining or worsening    Shift Goals  Clinical Goals: Hbg Q 6hrs, wound consult, vital signs  Patient Goals: rest  Family Goals: Not at bedside    Progress made toward(s) clinical / shift goals:  Patient is able to verbalize his plan of care.    Patient is not progressing towards the following goals: N/A

## 2023-11-06 NOTE — DIETARY
"Nutrition services: Day 1 of admit.  Sebastián Castro is a 58 y.o. male with admitting DX of Oral bleeding [K13.79]  Acute blood loss anemia [D62]     Consult received for MST 4: 24-33 lb wt loss x 3 months, poor PO intake PTA. +difficulty chewing    Assessment:  Height: 182.9 cm (6')  Weight: (!) 138 kg (305 lb 5.4 oz)  Body mass index is 41.41 kg/m²., BMI classification: NUTRITION SERVICES: BMI - Pt with BMI >40 (=Body mass index is 41.41 kg/m².), Class III obesity. Weight loss counseling not appropriate in acute care setting.  Diet/Intake: NPO; prior to NPO orders: 50-75% x 2, <25% x 1 meal    Evaluation:   PMHx includes CHF, a-fib, DM, diabetic neuropathy, HLD, HTN, nonischemic cardiomyopathy, hx of colectomy w/ colostomy takedown. Admitted w/ oral bleeding x 2 days following tooth extraction on Thursday; melanotic stools. Off floor to OR at this time.  Labs: glu 107, BUN 34  MAR: LR, synthroid, oxycodone, lasix  Skin: wound team consult pending for \"Groin and panus, sacrum/coccyx.\" Edema 1+ to BLEs  +2L O2 via nasal cannula  Last BM: 11/6, type 6  Chart wt hx highly variable; likely related, at least in part, to hx of CHF and associated volume changes  12/06/22: 280 lb  01/24/23: 250 lb  07/26/23: 336 lb  11/04/23: 289 lb    Malnutrition Risk: Unable to determine at this time    Recommendations/Plan:  Follow up w/ pt after procedure as able for interview/Nutrition-focused physical exam.   PO diet as medically feasible to be per attending physician/GI.  Once PO diet restarted, document intake of all PO as % taken in ADL's to provide interdisciplinary communication across all shifts.   Monitor weight.  Nutrition rep will continue to see patient for ongoing meal and snack preferences.   If appropriate at DC please refer to outpatient nutrition services for weight management.      RD following.  "

## 2023-11-06 NOTE — ANESTHESIA TIME REPORT
Anesthesia Start and Stop Event Times     Date Time Event    11/6/2023 1359 Ready for Procedure     1414 Anesthesia Start     1430 Anesthesia Stop        Responsible Staff  11/06/23    Name Role Begin End    Meng Jarrett D.O. Anesth 1414 1430        Overtime Reason:  no overtime (within assigned shift)    Comments:

## 2023-11-06 NOTE — ANESTHESIA POSTPROCEDURE EVALUATION
Patient: Sebastián Castro    Procedure Summary     Date: 11/06/23 Room / Location: [facility] ROOM 26 / SURGERY SAME DAY [facility]    Anesthesia Start: 1414 Anesthesia Stop: 1430    Procedure: GASTROSCOPY (Esophagus) Diagnosis: (mild patchy gastritis)    Surgeons: Handy Pereyra M.D. Responsible Provider: Meng Jarrett D.O.    Anesthesia Type: general ASA Status: 3          Final Anesthesia Type: general  Last vitals  BP   Blood Pressure: 131/59    Temp   36.8 °C (98.2 °F)    Pulse   99   Resp   20    SpO2   96 %      Anesthesia Post Evaluation    Patient location during evaluation: PACU  Patient participation: complete - patient participated  Level of consciousness: awake and alert  Pain score: 1    Airway patency: patent  Anesthetic complications: no  Cardiovascular status: hemodynamically stable  Respiratory status: acceptable  Hydration status: euvolemic    PONV: none          No notable events documented.     Nurse Pain Score: 0 (NPRS)

## 2023-11-06 NOTE — OR NURSING
*This is a Running Note*    1427 Pt to PACU 21 from OR. Bedside report from anesthesiologist and RN.  Attached to monitoring, VSS, breathing is calm and unlabored, Patient is asleep currently. Remains on 6 L oxygen via mask with an Oral airway in place.      1434 Pt denies pain or nausea at this time.     1440 Pt Now on 2l NC, and has had some water, tolerating well.    1455 Called monitor and pt is seen from monitor room. Called adrian PÉREZ and Gave report.    1506 Pt in route to Floor with transport and RN Adrian was updated.

## 2023-11-06 NOTE — CARE PLAN
The patient is Watcher - Medium risk of patient condition declining or worsening    Shift Goals  Clinical Goals: monitor H&H,  Patient Goals: rest  Family Goals: Not at bedside    Progress made toward(s) clinical / shift goals:  yes    Problem: Knowledge Deficit - Standard  Goal: Patient and family/care givers will demonstrate understanding of plan of care, disease process/condition, diagnostic tests and medications  Outcome: Progressing     Problem: Skin Integrity  Goal: Skin integrity is maintained or improved  Outcome: Progressing     Problem: Fall Risk  Goal: Patient will remain free from falls  Outcome: Progressing     Problem: Hemodynamics  Goal: Patient's hemodynamics, fluid balance and neurologic status will be stable or improve  Outcome: Progressing       Patient is not progressing towards the following goals:       Infliximab Counseling:  I discussed with the patient the risks of infliximab including but not limited to myelosuppression, immunosuppression, autoimmune hepatitis, demyelinating diseases, lymphoma, and serious infections.  The patient understands that monitoring is required including a PPD at baseline and must alert us or the primary physician if symptoms of infection or other concerning signs are noted.

## 2023-11-06 NOTE — CARE PLAN
The patient is Stable - Low risk of patient condition declining or worsening    Shift Goals  Clinical Goals: Pain management/EGD  Patient Goals: rest  Family Goals: Not at bedside      Problem: Knowledge Deficit - Standard  Goal: Patient and family/care givers will demonstrate understanding of plan of care, disease process/condition, diagnostic tests and medications  Outcome: Progressing     Problem: Pain - Standard  Goal: Alleviation of pain or a reduction in pain to the patient’s comfort goal  Outcome: Progressing     Pain management discussed with patient. Have provided PRN medications per MAR and non-pharmacological interventions as needed. Patient verbalizes understanding of calling for pain needs has notified RN appropriately.

## 2023-11-06 NOTE — DISCHARGE PLANNING
Care Transition Team Assessment    Pt is a long term resident at [facility] and anticipate pt will return there upon DC.    Information Source  Orientation Level: Oriented X4  Information Given By: Patient, Other (Comments) (chart review)  Informant's Name: Sebastián Castro  Who is responsible for making decisions for patient? : Patient    Elopement Risk  Legal Hold: No  Ambulatory or Self Mobile in Wheelchair: No-Not an Elopement Risk  Elopement Risk: Not at Risk for Elopement    Interdisciplinary Discharge Planning  Lives with - Patient's Self Care Capacity: Attendant / Paid Care Giver  Patient or legal guardian wants to designate a caregiver: No  Support Systems: Home Care Staff  Housing / Facility: Extended Care Facility  Name of Care Facility: [facility]  Durable Medical Equipment: Walker, Home Oxygen, Other - Specify (Wheelchair)    Discharge Preparedness  What is your plan after discharge?: Skilled nursing facility  What are your discharge supports?: Other (comment) (SNF staff)  Prior Functional Level: Total Assist  Difficulity with ADLs: Bathing, Dressing, Eating, Toileting, Walking  Difficulity with IADLs: Cooking, Driving, Keeping track of finances, Laundry, Managing medication, Shopping, Using the telephone or computer    Functional Assesment  Prior Functional Level: Total Assist    Finances  Financial Barriers to Discharge: No  Prescription Coverage: Yes    Vision / Hearing Impairment  Vision Impairment : No  Hearing Impairment : No    Domestic Abuse  Have you ever been the victim of abuse or violence?: No  Physical Abuse or Sexual Abuse: No  Verbal Abuse or Emotional Abuse: No  Possible Abuse/Neglect Reported to:: Not Applicable    Psychological Assessment  History of Substance Abuse: None  History of Psychiatric Problems: No  Non-compliant with Treatment: No  Newly Diagnosed Illness: No    Discharge Risks or Barriers  Discharge risks or barriers?: No    Anticipated Discharge Information  Discharge  Disposition: D/T to SNF with Medicare cert in anticipation of skilled care (03)  Discharge Address: [address] [place] [address]

## 2023-11-06 NOTE — ANESTHESIA PREPROCEDURE EVALUATION
Case: [id number] Date/Time: 11/06/23 1420    Procedure: GASTROSCOPY (Esophagus)    Anesthesia type: MAC    Pre-op diagnosis: Anemia due to GI blood loss, melena    Location: [facility] ROOM 26 / SURGERY SAME DAY [facility]    Surgeons: Hanyd Pereyra M.D.          Relevant Problems   NEURO   (positive) Psychogenic nonepileptic seizure      CARDIAC   (positive) AF (atrial fibrillation) (HCC)   (positive) Chronic venous insufficiency of lower extremity   (positive) Permanent atrial fibrillation (HCC)      ENDO   (positive) Hypothyroidism   (positive) Type 2 diabetes mellitus with hyperglycemia, with long-term current use of insulin (HCC)       Physical Exam    Airway   Mallampati: II  TM distance: >3 FB  Neck ROM: full       Cardiovascular   Rhythm: irregular  Rate: normal  (-) murmur     Dental - normal exam           Pulmonary - normal exam  Breath sounds clear to auscultation     Abdominal    Neurological - normal exam                 Anesthesia Plan    ASA 3   ASA physical status 3 criteria: morbid obesity - BMI greater than or equal to 40 and diabetes - poorly controlled    Plan - general       Airway plan will be natural airway          Induction: intravenous    Postoperative Plan: Postoperative administration of opioids is intended.    Pertinent diagnostic labs and testing reviewed    Informed Consent:    Anesthetic plan and risks discussed with patient.    Use of blood products discussed with: patient whom consented to blood products.

## 2023-11-06 NOTE — OP REPORT
OPERATIVE REPORT    PATIENT:   Sebastián Castro   1965       PREOPERATIVE DIAGNOSES/INDICATIONS: Melena, anemia due to bleeding, oral bleeding after tooth extraction, on anticoagulation.    PROCEDURE: Diagnostic EGD    PHYSICIAN:  Handy Pereyra MD     ANESTHESIA:  Per anesthesiologist.  [facility]    LOCATION: [facility]    CONSENT: The risks, benefits and alternatives of the procedure were discussed in detail. The risks include and are not limited to bleeding, infection, perforation, missed lesions, and sedations risks (cardiopulmonary compromise and allergic reaction to medications).    DESCRIPTION:   The patient presented to the operating room.  A time out was performed prior to beginning the procedure.   The patient was placed in the left lateral recumbent position. Patient was sedated by anesthesia: Propofol.    OPERATIVE FINDINGS:    Esophagus: Normal.  Stomach: Mild patchy gastritis.  More prominent in the body.  Stomach otherwise normal.  Duodenum: Normal to the second portion.    Blood loss: None    The patient tolerated the procedure well.      There were no immediate complications.    Pathology samples obtained: None    IMPRESSION:  Normal upper endoscopy with the exception of very mild patchy gastritis.  Melena secondary to swallowed blood from tooth extraction.    RECOMMENDATIONS:  Advance diet.  GI will sign off.  Reconsult if needed.

## 2023-11-06 NOTE — PROGRESS NOTES
4 Eyes Skin Assessment Completed by ELISA Michael and Riky,.    Head WDL  Ears Redness  Nose WDL  Mouth Bleeding  Neck Bruising  Breast/Chest Redness and Rash  Shoulder Blades WDL  Spine WDL  (R) Arm/Elbow/Hand WDL  (L) Arm/Elbow/Hand WDL  Abdomen Redness  Groin WDL  Scrotum/Coccyx/Buttocks Redness and Blanching  (R) Leg Swelling  (L) Leg Swelling  (R) Heel/Foot/Toe Redness and Blanching  (L) Heel/Foot/Toe Redness and Blanching          Devices In Places Tele Box, Blood Pressure Cuff, Pulse Ox, SCD's, and Nasal Cannula      Interventions In Place Gray Ear Foams, InterDry, Heel Mepilex, Sacral Mepilex, Pillows, Q2 Turns, and Pressure Redistribution Mattress    Possible Skin Injury No    Pictures Uploaded Into Epic Yes  Wound Consult Placed Yes  RN Wound Prevention Protocol Ordered Yes    no

## 2023-11-06 NOTE — PROGRESS NOTES
Received report from NOC shift RN. Patient is A&Ox4, VSS, no signs of distress. All questions and concerns answered, call light in reach, bed alarm on, plan of care ongoing.

## 2023-11-07 VITALS
BODY MASS INDEX: 39.95 KG/M2 | TEMPERATURE: 97.7 F | RESPIRATION RATE: 18 BRPM | HEIGHT: 72 IN | WEIGHT: 294.98 LBS | HEART RATE: 93 BPM | DIASTOLIC BLOOD PRESSURE: 63 MMHG | SYSTOLIC BLOOD PRESSURE: 98 MMHG | OXYGEN SATURATION: 95 %

## 2023-11-07 PROBLEM — K13.79 ORAL BLEEDING: Status: RESOLVED | Noted: 2023-11-04 | Resolved: 2023-11-07

## 2023-11-07 PROBLEM — K92.1 MELANOTIC STOOLS: Status: RESOLVED | Noted: 2023-11-05 | Resolved: 2023-11-07

## 2023-11-07 LAB
ANION GAP SERPL CALC-SCNC: 8 MMOL/L (ref 7–16)
BUN SERPL-MCNC: 28 MG/DL (ref 8–22)
CALCIUM SERPL-MCNC: 9.3 MG/DL (ref 8.5–10.5)
CHLORIDE SERPL-SCNC: 100 MMOL/L (ref 96–112)
CO2 SERPL-SCNC: 31 MMOL/L (ref 20–33)
CREAT SERPL-MCNC: 1.28 MG/DL (ref 0.5–1.4)
ERYTHROCYTE [DISTWIDTH] IN BLOOD BY AUTOMATED COUNT: 53.8 FL (ref 35.9–50)
GFR SERPLBLD CREATININE-BSD FMLA CKD-EPI: 64 ML/MIN/1.73 M 2
GLUCOSE BLD STRIP.AUTO-MCNC: 114 MG/DL (ref 65–99)
GLUCOSE SERPL-MCNC: 99 MG/DL (ref 65–99)
HCT VFR BLD AUTO: 27.1 % (ref 42–52)
HGB BLD-MCNC: 7.7 G/DL (ref 14–18)
HGB BLD-MCNC: 8.3 G/DL (ref 14–18)
MAGNESIUM SERPL-MCNC: 1.5 MG/DL (ref 1.5–2.5)
MCH RBC QN AUTO: 28.1 PG (ref 27–33)
MCHC RBC AUTO-ENTMCNC: 30.6 G/DL (ref 32.3–36.5)
MCV RBC AUTO: 91.9 FL (ref 81.4–97.8)
PHOSPHATE SERPL-MCNC: 3.7 MG/DL (ref 2.5–4.5)
PLATELET # BLD AUTO: 173 K/UL (ref 164–446)
PMV BLD AUTO: 10.3 FL (ref 9–12.9)
POTASSIUM SERPL-SCNC: 3.4 MMOL/L (ref 3.6–5.5)
RBC # BLD AUTO: 2.95 M/UL (ref 4.7–6.1)
SODIUM SERPL-SCNC: 139 MMOL/L (ref 135–145)
WBC # BLD AUTO: 5.3 K/UL (ref 4.8–10.8)

## 2023-11-07 PROCEDURE — A9270 NON-COVERED ITEM OR SERVICE: HCPCS | Performed by: HOSPITALIST

## 2023-11-07 PROCEDURE — 83735 ASSAY OF MAGNESIUM: CPT

## 2023-11-07 PROCEDURE — 99239 HOSP IP/OBS DSCHRG MGMT >30: CPT | Performed by: INTERNAL MEDICINE

## 2023-11-07 PROCEDURE — 700102 HCHG RX REV CODE 250 W/ 637 OVERRIDE(OP): Performed by: HOSPITALIST

## 2023-11-07 PROCEDURE — 700102 HCHG RX REV CODE 250 W/ 637 OVERRIDE(OP): Performed by: INTERNAL MEDICINE

## 2023-11-07 PROCEDURE — 700102 HCHG RX REV CODE 250 W/ 637 OVERRIDE(OP): Performed by: STUDENT IN AN ORGANIZED HEALTH CARE EDUCATION/TRAINING PROGRAM

## 2023-11-07 PROCEDURE — 82962 GLUCOSE BLOOD TEST: CPT

## 2023-11-07 PROCEDURE — 85027 COMPLETE CBC AUTOMATED: CPT

## 2023-11-07 PROCEDURE — A9270 NON-COVERED ITEM OR SERVICE: HCPCS | Performed by: INTERNAL MEDICINE

## 2023-11-07 PROCEDURE — A9270 NON-COVERED ITEM OR SERVICE: HCPCS | Performed by: STUDENT IN AN ORGANIZED HEALTH CARE EDUCATION/TRAINING PROGRAM

## 2023-11-07 PROCEDURE — 80048 BASIC METABOLIC PNL TOTAL CA: CPT

## 2023-11-07 PROCEDURE — 36415 COLL VENOUS BLD VENIPUNCTURE: CPT

## 2023-11-07 PROCEDURE — 85018 HEMOGLOBIN: CPT

## 2023-11-07 PROCEDURE — 84100 ASSAY OF PHOSPHORUS: CPT

## 2023-11-07 RX ORDER — OXYCODONE HYDROCHLORIDE 5 MG/1
5 TABLET ORAL EVERY 6 HOURS PRN
Qty: 10 TABLET | Refills: 0 | Status: SHIPPED | OUTPATIENT
Start: 2023-11-07 | End: 2023-11-12

## 2023-11-07 RX ORDER — POTASSIUM CHLORIDE 20 MEQ/1
40 TABLET, EXTENDED RELEASE ORAL ONCE
Status: COMPLETED | OUTPATIENT
Start: 2023-11-07 | End: 2023-11-07

## 2023-11-07 RX ORDER — ZIPRASIDONE HYDROCHLORIDE 40 MG/1
40 CAPSULE ORAL 2 TIMES DAILY WITH MEALS
Status: DISCONTINUED | OUTPATIENT
Start: 2023-11-07 | End: 2023-11-07 | Stop reason: HOSPADM

## 2023-11-07 RX ORDER — CHLORHEXIDINE GLUCONATE ORAL RINSE 1.2 MG/ML
15 SOLUTION DENTAL 2 TIMES DAILY
Qty: 118 ML | Refills: 0 | Status: SHIPPED
Start: 2023-11-07 | End: 2023-11-11

## 2023-11-07 RX ORDER — DOXYCYCLINE 100 MG/1
100 TABLET ORAL EVERY 12 HOURS
Qty: 14 TABLET | Refills: 0 | Status: ACTIVE | DISCHARGE
Start: 2023-11-07 | End: 2023-11-14

## 2023-11-07 RX ADMIN — PAROXETINE 30 MG: 30 TABLET, FILM COATED ORAL at 05:21

## 2023-11-07 RX ADMIN — 0.12% CHLORHEXIDINE GLUCONATE 15 ML: 1.2 RINSE ORAL at 05:27

## 2023-11-07 RX ADMIN — POTASSIUM CHLORIDE 40 MEQ: 1500 TABLET, EXTENDED RELEASE ORAL at 10:32

## 2023-11-07 RX ADMIN — METOPROLOL TARTRATE 25 MG: 25 TABLET, FILM COATED ORAL at 06:18

## 2023-11-07 RX ADMIN — LEVOTHYROXINE SODIUM 150 MCG: 0.07 TABLET ORAL at 05:20

## 2023-11-07 RX ADMIN — DOXYCYCLINE 100 MG: 100 TABLET, FILM COATED ORAL at 05:20

## 2023-11-07 RX ADMIN — OXYCODONE HYDROCHLORIDE 10 MG: 10 TABLET ORAL at 07:33

## 2023-11-07 RX ADMIN — CLONAZEPAM 0.25 MG: 0.5 TABLET ORAL at 05:21

## 2023-11-07 RX ADMIN — NYSTATIN: 100000 POWDER TOPICAL at 05:27

## 2023-11-07 RX ADMIN — TAMSULOSIN HYDROCHLORIDE 0.4 MG: 0.4 CAPSULE ORAL at 07:31

## 2023-11-07 RX ADMIN — ZIPRASIDONE HYDROCHLORIDE 40 MG: 40 CAPSULE ORAL at 07:31

## 2023-11-07 RX ADMIN — DAPAGLIFLOZIN 10 MG: 10 TABLET, FILM COATED ORAL at 05:21

## 2023-11-07 ASSESSMENT — ENCOUNTER SYMPTOMS
WEAKNESS: 1
CHILLS: 0
BLOOD IN STOOL: 1
ORTHOPNEA: 0
COUGH: 0
SENSORY CHANGE: 0
DEPRESSION: 0
BLURRED VISION: 0
HEARTBURN: 0
MYALGIAS: 1
SPUTUM PRODUCTION: 0
HEMOPTYSIS: 0
ABDOMINAL PAIN: 0
HEADACHES: 0
FEVER: 0
PHOTOPHOBIA: 0
NAUSEA: 0
PALPITATIONS: 0
DIZZINESS: 0
VOMITING: 0
SPEECH CHANGE: 0
HALLUCINATIONS: 0

## 2023-11-07 ASSESSMENT — FIBROSIS 4 INDEX: FIB4 SCORE: 2.13

## 2023-11-07 ASSESSMENT — LIFESTYLE VARIABLES: SUBSTANCE_ABUSE: 0

## 2023-11-07 NOTE — DISCHARGE PLANNING
DC Transport Scheduled    Received request at: 11/6/2023 at 1555    Transport Company Scheduled:  [employer]  Spoke with Wendie at [facility] to schedule transport.    Scheduled Date: 11/7/2023  Scheduled Time: 1100    Dest[facility] [address] [place]     Notified care team of scheduled transport via [employer].     If there are any changes needed to the DC transportation scheduled, please contact [facility] at ext. [phone] between the hours of 0144-4731 Mon-Fri. If outside those hours, contact the ED Case Manager at ext. [phone].

## 2023-11-07 NOTE — PROGRESS NOTES
[facility] Medicine Daily Progress Note    Date of Service  11/6/2023    Chief Complaint  Sebastián Castro is a 58 y.o. male admitted 11/4/2023 with oral bleeding    Hospital Course  Sebastián Castro is a 58 y.o. male with past medical history of CHF, A-fib who presented to the emergency department at [facility] With bleeding ongoing for the last 2 days.  He apparently had 2 teeth removed on Thursday but on Friday he noted some bleeding coming from his mouth.  It did not stop so he sought medical care.  Patient has not taken his Eliquis for the last 3 days.  He takes Eliquis for his history of paroxysmal A-fib.  Patient is complaining of some mild mouth pain at the site of where the teeth were extracted     He was initially seen at [facility] ED for this where he was given TXA twice, surgicell and has blood transfused for hemoglobin of 6.9.  I discussed the plan of care with patient.    Interval Problem Update  Since yesterday afternoon patient has had 2 large melanotic stools  His hemoglobin dropped down to 6.4 requiring an additional 2 units of PRBCs which brings him a total total of 3 units of PRBCs during this current event  I have consulted Dr. Pereyra of GI for evaluation for possible endoscopy.    11/6  Going for EGD today. Feels weak but no ongoing bleeding, pain controlled. Hgb drifted to 7's, continue to trend. Pain and bleeding resolved.       I have discussed this patient's plan of care and discharge plan at IDT rounds today with Case Management, Nursing, Nursing leadership, and other members of the IDT team.  No interactions with anyone       Consultants/Specialty  GI    Code Status  Full Code    Disposition  Medically Cleared  I have placed the appropriate orders for post-discharge needs.    Review of Systems  Review of Systems   Constitutional:  Positive for malaise/fatigue. Negative for chills and fever.   HENT:  Negative for ear pain, hearing loss and tinnitus.    Eyes:  Negative for blurred  vision and photophobia.   Respiratory:  Negative for cough, hemoptysis and sputum production.    Cardiovascular:  Positive for leg swelling. Negative for chest pain, palpitations and orthopnea.   Gastrointestinal:  Positive for blood in stool and melena. Negative for abdominal pain, heartburn, nausea and vomiting.   Genitourinary:  Negative for dysuria, frequency and urgency.   Musculoskeletal:  Positive for joint pain and myalgias.   Skin:  Negative for itching and rash.   Neurological:  Positive for weakness. Negative for dizziness, sensory change, speech change and headaches.   Psychiatric/Behavioral:  Negative for depression, hallucinations, substance abuse and suicidal ideas.         Physical Exam  Temp:  [36 °C (96.8 °F)-37 °C (98.6 °F)] 36.3 °C (97.3 °F)  Pulse:  [] 111  Resp:  [11-20] 18  BP: ()/(55-84) 122/66  SpO2:  [91 %-100 %] 99 %    Physical Exam  Constitutional:       General: He is not in acute distress.     Appearance: He is not ill-appearing, toxic-appearing or diaphoretic.   HENT:      Head: Normocephalic.      Nose: Nose normal.      Mouth/Throat:      Pharynx: No oropharyngeal exudate.   Eyes:      General: No scleral icterus.        Right eye: No discharge.         Left eye: No discharge.      Extraocular Movements: Extraocular movements intact.      Pupils: Pupils are equal, round, and reactive to light.   Cardiovascular:      Rate and Rhythm: Normal rate and regular rhythm.      Heart sounds: No murmur heard.     No gallop.   Pulmonary:      Effort: Pulmonary effort is normal. No respiratory distress.      Breath sounds: No stridor. No wheezing or rhonchi.   Abdominal:      General: There is no distension.      Palpations: There is no mass.      Tenderness: There is no abdominal tenderness.      Hernia: No hernia is present.   Musculoskeletal:         General: No swelling, tenderness, deformity or signs of injury. Normal range of motion.      Cervical back: Normal range of motion.    Skin:     Capillary Refill: Capillary refill takes 2 to 3 seconds.      Coloration: Skin is pale. Skin is not jaundiced.      Findings: No bruising.   Neurological:      General: No focal deficit present.      Mental Status: He is oriented to person, place, and time.      Cranial Nerves: No cranial nerve deficit.      Sensory: No sensory deficit.      Motor: Weakness present.      Deep Tendon Reflexes: Reflexes normal.   Psychiatric:         Mood and Affect: Mood normal.         Behavior: Behavior normal.         Fluids    Intake/Output Summary (Last 24 hours) at 11/7/2023 0654  Last data filed at 11/6/2023 1950  Gross per 24 hour   Intake 610 ml   Output 800 ml   Net -190 ml         Laboratory  Recent Labs     11/04/23  1139 11/04/23  2019 11/05/23  1850 11/06/23  0113 11/06/23  1551 11/06/23  2152 11/07/23  0102   WBC 6.3  --   --   --   --   --   --    RBC 2.66*  --   --   --   --   --   --    HEMOGLOBIN 7.5*   < > 8.2*   < > 8.1* 7.4* 7.7*   HEMATOCRIT 24.9*  --  26.3*  --   --   --   --    MCV 93.6  --   --   --   --   --   --    MCH 28.2  --   --   --   --   --   --    MCHC 30.1*  --   --   --   --   --   --    RDW 53.1*  --   --   --   --   --   --    PLATELETCT 178  --   --   --   --   --   --    MPV 11.2  --   --   --   --   --   --     < > = values in this interval not displayed.       Recent Labs     11/04/23  1139   SODIUM 139   POTASSIUM 4.5   CHLORIDE 104   CO2 26   GLUCOSE 107*   BUN 34*   CREATININE 1.19   CALCIUM 9.1       Recent Labs     11/04/23  1139   APTT 38.5*   INR 1.77*                 Imaging  No orders to display        Assessment/Plan  * Oral bleeding- (present on admission)  Assessment & Plan  11/7/2023  Related to Apixaban in the setting of tooth extractions.    Monitor and supportive care and transfuse PRBCs if hemoglobin is less than 7    Dr. Giang oral surgery is available if needed    Melanotic stools- (present on admission)  Assessment & Plan  11/7/2023  Could be related to  swallowed blood however patient has never had endoscopy and has had severe anemia requiring blood transfusion it is imperative that we investigate the bowels plus minus stomach for possible bleeding sources    Plan for endoscopy in the next 24 to 48 hours--> dr alegria    Status post EGD 11/6 with no bleeding    S/P tooth extraction- (present on admission)  Assessment & Plan  11/7/2023  Antibiotic doxycycline and p.o. Peridex mouthwash    Chronic diastolic heart failure (HCC)- (present on admission)  Assessment & Plan  11/7/2023  Diuretics have been held on 11/5/2023 as we saw effective diuresis on 11/4/2023 and patient is currently dealing with bleeding episodes we do not want patient become hypovolemic  Not in acute exacerbation    AF (atrial fibrillation) (HCC)- (present on admission)  Assessment & Plan  11/7/2023  Rate controlled and holding eliquis due to active bleeding         VTE prophylaxis:   SCDs/TEDs      I have performed a physical exam and reviewed and updated ROS and Plan today (11/6/2023). In review of yesterday's note (11/5/2023), there are no changes except as documented above.    Total time spent 46 minutes. I spent greater than 50% of the time for patient care, counseling, and coordination on this date, including unit/floor time, and face-to-face time with the patient as per interval events, my own review of patient's imaging and lab analysis and developing my assessment and plan above.

## 2023-11-07 NOTE — PROGRESS NOTES
Patient is A&Ox4. Discharge instructions. Personal belongings in possession with patient. PIV and tele monitor removed. Copy of discharge instructions in patient chart, signed and reviewed. Patient verbalizes the understanding of the discharge instructions. Questions and concerns addressed prior to leaving the unit. Transported via [place] by [employer]. Patient discharged to [facility].

## 2023-11-07 NOTE — WOUND TEAM
[facility]  Inpatient Services  Wound and Skin Care Brief Evaluation    Admission Date: 11/4/2023     Last order of IP CONSULT TO WOUND CARE was found on 11/4/2023 from Hospital Encounter on 11/4/2023     HPI, PMH, SH: Reviewed    Chief Complaint   Patient presents with    Post Op Bleeding     3 teeth extracted 11/2 with persistent oral bleeding since then. On eliquis for afib, last dose 11/1 AM. Transfer from [facility] where he was given TXA x 2, surgicell, and 1 unit PRBC for hgb 6.9.     Diagnosis: Oral bleeding [K13.79]  Acute blood loss anemia [D62]    Unit where seen by Wound Team: T732/02     Wound consult placed regarding sacrum and ABD. Chart and images reviewed. This discussed with bedside RN. This clinician in to assess patient. Patient pleasant and agreeable. Non-selectively debrided with Moist warm washcloth.     No pressure injuries or advanced wound care needs identified. Wound consult completed. No further follow up unless indicated and consulted.                                PREVENTATIVE INTERVENTIONS:    Q shift Michael - performed per nursing policy  Q shift pressure point assessments - performed per nursing policy    Surface/Positioning  Standard/trauma mattress - Currently in Place  Reposition q 2 hours - Currently in Place  Waffle overlay  - Currently in Place    Offloading/Redistribution  Sacral offloading dressing (Silicone dressing) - Currently in Place  Heel offloading dressing (Silicone dressing) - Currently in Place  Float Heels off Bed with Pillows - Currently in Place           Respiratory  Silicone O2 tubing - Currently in Place  Gray Foam Ear protectors - Currently in Place    Containment/Moisture Prevention    Purwick/Condom Cath - Currently in Place

## 2023-11-07 NOTE — DISCHARGE PLANNING
Case Management Discharge Planning    Admission Date: 11/4/2023  GMLOS: 2.9  ALOS: 1    6-Clicks ADL Score: 13  6-Clicks Mobility Score: 11  PT and/or OT Eval ordered: Yes  Post-acute Referrals Ordered: Yes  Post-acute Choice Obtained: Yes  Has referral(s) been sent to post-acute provider:  Yes      Anticipated Discharge Dispo: Discharge Disposition: D/T to SNF with Medicare cert in anticipation of skilled care (03)  Discharge Address: [address] [place] [address], [place] [address]    DME Needed: No    Action(s) Taken: LSW notified that pt is anticipated to be medically clear tomorrow and requested transportation be arranged back to [facility]. LSW faxed transportation forms to ride line to request 1100 transport.    Escalations Completed: None    Medically Clear: No    Next Steps: Care coordination will f/u with [facility] to verify transportation time    Barriers to Discharge: Medical clearance    Is the patient up for discharge tomorrow: Yes    Is transport arranged for discharge disposition: Yes

## 2023-11-07 NOTE — PROGRESS NOTES
Bedside report received by RN and patient care assumed. Tele Box in place, patient is A&O4, resting in bed, and on 2LNC. Patient shows no signs of distress, unlabored breathing. Fall precautions in place and patient educated on use of call light for assistance. Pt updated on POC with no questions or concerns. Hourly monitoring initiated.

## 2023-11-07 NOTE — DISCHARGE SUMMARY
Discharge Summary    CHIEF COMPLAINT ON ADMISSION  Chief Complaint   Patient presents with    Post Op Bleeding     3 teeth extracted 11/2 with persistent oral bleeding since then. On eliquis for afib, last dose 11/1 AM. Transfer from [facility] where he was given TXA x 2, surgicell, and 1 unit PRBC for hgb 6.9.       Reason for Admission  EMS     Admission Date  11/4/2023    CODE STATUS  Full Code    HPI & HOSPITAL COURSE  Sebastián Castro is a 58 y.o. male with past medical history of CHF, A-fib who presented to the emergency department at [facility] With bleeding ongoing for the last 2 days.  He apparently had 2 teeth removed on Thursday but on Friday he noted some bleeding coming from his mouth.  It did not stop so he sought medical care.  Patient has not taken his Eliquis for the last 3 days.  He takes Eliquis for his history of paroxysmal A-fib.  Patient is complaining of some mild mouth pain at the site of where the teeth were extracted     He was initially seen at [facility] ED for this where he was given TXA twice, surgicell and has blood transfused for hemoglobin of 6.9.  With significant antibiotic allergies patient was started on doxycycline as well as chlorhexidine with recent tooth extraction.    Patient did develop melanotic stools and GI was consulted.  Status post EGD in 11/6 which showed only mild gastritis.  Melena likely secondary to swallowing blood from acute extraction.    C. difficile and stool cultures were negative.    Patient was transfused 1 unit of packed red blood cells on 11/4, but otherwise his hemoglobin has remained stable.    Medically stable to discharge to [facility] nursing [facility].  Follow-up with primary care, dentist as outpatient.    Therefore, he is discharged in fair and stable condition to skilled nursing facility.    The patient met 2-midnight criteria for an inpatient stay at the time of discharge.    Discharge Date  11/7/2023    FOLLOW UP ITEMS POST  DISCHARGE  None    DISCHARGE DIAGNOSES  Principal Problem (Resolved):    Oral bleeding (POA: Yes)  Active Problems:    AF (atrial fibrillation) (HCC) (POA: Yes)    Chronic diastolic heart failure (HCC) (POA: Yes)    S/P tooth extraction (POA: Yes)    Acute blood loss anemia (POA: Yes)  Resolved Problems:    Melanotic stools (POA: Yes)      FOLLOW UP  Future Appointments   Date Time Provider Department Center   11/21/2023  9:15 AM ELIZABETH Sierra None     [facility]  [address] [facility] [address]  [phone]        Marielle Alvarez M.D.  [address] [facility] [address]  [phone]    Follow up        MEDICATIONS ON DISCHARGE     Medication List        START taking these medications        Instructions   chlorhexidine 0.12 % Soln  Commonly known as: Peridex   Take 15 mL by mouth 2 times a day for 4 days.  Dose: 15 mL     doxycycline monohydrate 100 MG tablet  Commonly known as: Adoxa   Take 1 Tablet by mouth every 12 hours for 7 days.  Dose: 100 mg            CONTINUE taking these medications        Instructions   apixaban 5mg Tabs  Commonly known as: Eliquis   Take 1 Tablet by mouth 2 times a day. Indications: Thromboembolism secondary to Atrial Fibrillation  Dose: 5 mg     clonazePAM 0.5 MG Tabs  Commonly known as: KlonoPIN   Take  by mouth 2 times a day.     dapagliflozin propanediol 10 MG Tabs  Commonly known as: Farxiga   Take 1 Tablet by mouth every day.  Dose: 10 mg     furosemide 20 MG Tabs  Commonly known as: Lasix   Take 1 Tablet by mouth as needed (Take 1 as needed for any wieght gain of 3lbs on a day or 5lbs in a week or any increased lower leg swelling or shortness of breath.).  Dose: 20 mg     levothyroxine 150 MCG Tabs  Commonly known as: Synthroid   Take 1 tablet by mouth every morning on an empty stomach.  Dose: 150 mcg     loperamide 2 MG Caps  Commonly known as: Imodium   Take 2 mg by mouth 4 times a day as needed for Diarrhea.  Dose: 2 mg      MAG-OX PO   Take  by mouth.     metoprolol  MG Tb24  Commonly known as: Toprol XL   Take 1 Tablet by mouth every day.  Dose: 100 mg     omeprazole 40 MG delayed-release capsule  Commonly known as: PriLOSEC   Take 1 capsule by mouth every 12 hours.  Dose: 40 mg     PARoxetine 30 MG Tabs  Commonly known as: Paxil   Take 30 mg by mouth every day.  Dose: 30 mg     ROPINIRole 0.25 MG Tabs  Commonly known as: Requip   Take 0.5 mg by mouth at bedtime.  Dose: 0.5 mg     spironolactone 25 MG Tabs  Commonly known as: Aldactone   Take 1 Tablet by mouth every day.  Dose: 25 mg     tamsulosin 0.4 MG capsule  Commonly known as: Flomax   Take 1 capsule by mouth 1/2 hour after breakfast.  Dose: 0.4 mg     therapeutic multivitamin-minerals Tabs   Take 1 Tablet by mouth every day.  Dose: 1 Tablet     traZODone 50 MG Tabs  Commonly known as: Desyrel   Take 50 mg by mouth every evening.  Dose: 50 mg     vitamin D 1000 UNIT Tabs  Commonly known as: Cholecalciferol   Take 1 tablet by mouth every day.  Dose: 1,000 Units     ziprasidone 40 MG Caps  Commonly known as: Geodon   Take 40 mg by mouth 2 times a day.  Dose: 40 mg              Allergies  Allergies   Allergen Reactions    Zosyn [Piperacillin Sod-Tazobactam So] Rash and Itching     Redness/flushed throughout body.     Daptomycin Rash     Body rash  RXN=1/2018      Pcn [Penicillins] Hives and Rash      Rash & hives  RXN=since a kid  Tolerates cephalosporins    Unasyn [Ampicillin-Sulbactam Sodium] Hives, Itching and Swelling     Received doses as recently as 2017 without incident. On 2/18/2018 developed a rash after receiving Unasyn.    Vancomycin Rash     Red man syndrome. Tolerates IV vancomycin at reduced infusion rate.       DIET  Orders Placed This Encounter   Procedures    Diet Order Diet: Consistent CHO (Diabetic)     Standing Status:   Standing     Number of Occurrences:   1     Order Specific Question:   Diet:     Answer:   Consistent CHO (Diabetic) [4]        ACTIVITY  As tolerated.  Weight bearing as tolerated    CONSULTATIONS  GI    PROCEDURES  EGD    LABORATORY  Lab Results   Component Value Date    SODIUM 139 11/07/2023    POTASSIUM 3.4 (L) 11/07/2023    CHLORIDE 100 11/07/2023    CO2 31 11/07/2023    GLUCOSE 99 11/07/2023    BUN 28 (H) 11/07/2023    CREATININE 1.28 11/07/2023        Lab Results   Component Value Date    WBC 5.3 11/07/2023    HEMOGLOBIN 8.3 (L) 11/07/2023    HEMATOCRIT 27.1 (L) 11/07/2023    PLATELETCT 173 11/07/2023        I discussed medications and side effects with the patient.  I discussed prognosis and importance of medical compliance with the patient.  I counseled the patient about diet, exercise, weight loss, smoking cessation, and life style modifications.  All questions and concerns have been addressed.  Total time of the discharge process was 37 minutes.

## 2023-11-07 NOTE — CARE PLAN
The patient is Watcher - Medium risk of patient condition declining or worsening    Shift Goals  Clinical Goals: pain management  Patient Goals: comfort, rest  Family Goals: VON    Progress made toward(s) clinical / shift goals:  yes    Patient is not progressing towards the following goals:      Problem: Knowledge Deficit - Standard  Goal: Patient and family/care givers will demonstrate understanding of plan of care, disease process/condition, diagnostic tests and medications  Outcome: Progressing     Problem: Skin Integrity  Goal: Skin integrity is maintained or improved  Outcome: Progressing     Problem: Fall Risk  Goal: Patient will remain free from falls  Outcome: Progressing     Problem: Pain - Standard  Goal: Alleviation of pain or a reduction in pain to the patient’s comfort goal  Outcome: Progressing

## 2023-11-07 NOTE — CARE PLAN
The patient is Stable - Low risk of patient condition declining or worsening    Shift Goals  Clinical Goals: Pain management  Patient Goals: comfort, rest  Family Goals: VON    Problem: Knowledge Deficit - Standard  Goal: Patient and family/care givers will demonstrate understanding of plan of care, disease process/condition, diagnostic tests and medications  Outcome: Progressing     Problem: Pain - Standard  Goal: Alleviation of pain or a reduction in pain to the patient’s comfort goal  Outcome: Progressing

## 2023-11-07 NOTE — THERAPY
Occupational Therapy Contact Note    Patient Name: Sebastián Castro  Age:  58 y.o., Sex:  male  Medical Record #: [id number]  Today's Date: 11/7/2023    OT consult rec'd. Per RN and chart, pt already has d/c orders for SNF and scheduled transport for today at 11am. Will hold OT eval, but continue to follow in case POC changes.

## 2023-11-07 NOTE — DISCHARGE PLANNING
Care Transition Team Final Discharge Disposition    Actual Discharge Information  Discharge Disposition: D/T to SNF with Medicare cert in anticipation of skilled care (03)    Pt cleared for DC today to SNF   Accepted to [facility] for admission today   Transportation secured through [employer] for 1100  by jj PÉREZ CM updated pt at bedside and called his brother Jean, had to leave a voicemail  SNF packet completed and available for nurse

## 2023-11-07 NOTE — PROGRESS NOTES
Received report from NOC shift RN. Patient is A&Ox4, VSS, no signs of distress, no complaints of pain. All questions and concerns answered, call light in reach, plan of care ongoing.

## 2023-11-07 NOTE — DISCHARGE INSTRUCTIONS
Discharge Instructions per DANGELO Louie.O.    DIET: Diet Order Diet: Consistent CHO (Diabetic)    ACTIVITY: As tolerated    A proper diet that is low in grease, fat, and salt, along with 30 minutes of exercise per day will lead to weight loss, and better controlled blood sugar and blood pressure.    DIAGNOSIS: Oral bleeding    Follow up with your Primary Care Provider Marielle Alvarez M.D. as scheduled or sooner if your symptoms persist or worsen.  Return to Emergency Room for sever chest pain, shortness of breath, signs of a stroke, or any other emergencies.

## 2023-11-10 NOTE — ASSESSMENT & PLAN NOTE
No longer hypertensive.  Off Lisinopril   [facility]  Division of Infectious Diseases  [phone]    Name: SIRENA ENG  Age: 25y  Gender: Male  MRN: [id number]    Interval History--  Notes reviewed.     Past Medical History--  Venous insufficiency        For details regarding the patient's social history, family history, and other miscellaneous elements, please refer the initial infectious diseases consultation and/or the admitting history and physical examination for this admission.    Allergies    No Known Drug Allergies  avocado (Hives; Swelling)  Kiwi (Hives; Swelling)    Intolerances        Medications--  Antibiotics:    Immunologic:    Other:  acetaminophen     Tablet .. PRN  chlorhexidine 2% Cloths  melatonin  nicotine - Inhaler      Review of Systems--  A 10-point review of systems was obtained.     Pertinent positives and negatives--  Constitutional: No fevers. No Chills. No Rigors.   Cardiovascular: No chest pain. No palpitations.  Respiratory: No shortness of breath. No cough.  Gastrointestinal: No nausea or vomiting. No diarrhea or constipation.   Psychiatric: Pleasant. Appropriate affect.    Review of systems otherwise negative except as previously noted.    Physical Examination--  Vital Signs: T(F): 98 (11-09-23 @ 22:10), Max: 98 (11-09-23 @ 17:10)  HR: 97 (11-09-23 @ 22:10)  BP: 125/82 (11-09-23 @ 22:10)  RR: 18 (11-09-23 @ 22:10)  SpO2: 97% (11-09-23 @ 22:10)  Wt(kg): --  General: Nontoxic-appearing Male in no acute distress.  HEENT: AT/NC. PERRL. EOMI. Anicteric. Conjunctiva pink and moist. Oropharynx clear. Dentition fair.  Neck: Not rigid. No sense of mass.  Nodes: None palpable.  Lungs: Clear bilaterally without rales, wheezing or rhonchi  Heart: Regular rate and rhythm. No Murmur. No rub. No gallop. No palpable thrill.  Abdomen: Bowel sounds present and normoactive. Soft. Nondistended. Nontender. No sense of mass. No organomegaly.  Back: No spinal tenderness. No costovertebral angle tenderness.   Extremities: No cyanosis or clubbing. No edema.   Skin: Warm. Dry. Good turgor. No rash. No vasculitic stigmata.  Psychiatric: Appropriate affect and mood for situation.         Laboratory Studies--  CBC                        13.6   8.93  )-----------( 361      ( 09 Nov 2023 07:28 )             42.5       Chemistries  11-09    141  |  105  |  15  ----------------------------<  92  3.9   |  22  |  0.85    Ca    9.3      09 Nov 2023 07:31    TPro  6.8  /  Alb  3.9  /  TBili  0.1<L>  /  DBili  x   /  AST  11  /  ALT  14  /  AlkPhos  78  11-09      Culture Data    Culture - Abscess with Gram Stain (collected 07 Nov 2023 16:27)  Source: .Abscess Leg - Right  Gram Stain (08 Nov 2023 01:08):    No polymorphonuclear cells seen per low power field    Numerous Gram positive cocci in pairs seen per oil power field    Numerous Gram Negative Rods seen per oil power field  Preliminary Report (08 Nov 2023 22:15):    Moderate Staphylococcus aureus    Numerous Proteus mirabilis  Organism: Staphylococcus aureus  Proteus mirabilis (09 Nov 2023 18:33)  Organism: Staphylococcus aureus (09 Nov 2023 18:33)  Organism: Proteus mirabilis (09 Nov 2023 18:33)    Culture - Blood (collected 07 Nov 2023 04:30)  Source: .Blood Blood-Peripheral  Preliminary Report (09 Nov 2023 12:01):    No growth at 48 Hours    Culture - Blood (collected 07 Nov 2023 04:15)  Source: .Blood Blood-Peripheral  Preliminary Report (09 Nov 2023 12:01):    No growth at 48 Hours             [facility]  Division of Infectious Diseases  [phone]    Name: SIRENA ENG  Age: 25y  Gender: Male  MRN: [id number]    Interval History--  Notes reviewed.     Past Medical History--  Venous insufficiency        For details regarding the patient's social history, family history, and other miscellaneous elements, please refer the initial infectious diseases consultation and/or the admitting history and physical examination for this admission.    Allergies    No Known Drug Allergies  avocado (Hives; Swelling)  Kiwi (Hives; Swelling)    Intolerances        Medications--  Antibiotics:    Immunologic:    Other:  acetaminophen     Tablet .. PRN  chlorhexidine 2% Cloths  melatonin  nicotine - Inhaler      Review of Systems--  A 10-point review of systems was obtained.     Pertinent positives and negatives--  Constitutional: No fevers. No Chills. No Rigors.   Cardiovascular: No chest pain. No palpitations.  Respiratory: No shortness of breath. No cough.  Gastrointestinal: No nausea or vomiting. No diarrhea or constipation.   Psychiatric: Pleasant. Appropriate affect.    Review of systems otherwise negative except as previously noted.    Physical Examination--  Vital Signs: T(F): 98 (11-09-23 @ 22:10), Max: 98 (11-09-23 @ 17:10)  HR: 97 (11-09-23 @ 22:10)  BP: 125/82 (11-09-23 @ 22:10)  RR: 18 (11-09-23 @ 22:10)  SpO2: 97% (11-09-23 @ 22:10)  Wt(kg): --  General: Nontoxic-appearing Male in no acute distress.  HEENT: AT/NC. PERRL. EOMI. Anicteric. Conjunctiva pink and moist. Oropharynx clear. Dentition fair.  Neck: Not rigid. No sense of mass.  Nodes: None palpable.  Lungs: Clear bilaterally without rales, wheezing or rhonchi  Heart: Regular rate and rhythm. No Murmur. No rub. No gallop. No palpable thrill.  Abdomen: Bowel sounds present and normoactive. Soft. Nondistended. Nontender. No sense of mass. No organomegaly.  Back: No spinal tenderness. No costovertebral angle tenderness.   Extremities: No cyanosis or clubbing. No edema.   Skin: Warm. Dry. Good turgor. No rash. No vasculitic stigmata.  Psychiatric: Appropriate affect and mood for situation.         Laboratory Studies--  CBC                        13.6   8.93  )-----------( 361      ( 09 Nov 2023 07:28 )             42.5       Chemistries  11-09    141  |  105  |  15  ----------------------------<  92  3.9   |  22  |  0.85    Ca    9.3      09 Nov 2023 07:31    TPro  6.8  /  Alb  3.9  /  TBili  0.1<L>  /  DBili  x   /  AST  11  /  ALT  14  /  AlkPhos  78  11-09      Culture Data    Culture - Abscess with Gram Stain (collected 07 Nov 2023 16:27)  Source: .Abscess Leg - Right  Gram Stain (08 Nov 2023 01:08):    No polymorphonuclear cells seen per low power field    Numerous Gram positive cocci in pairs seen per oil power field    Numerous Gram Negative Rods seen per oil power field  Preliminary Report (08 Nov 2023 22:15):    Moderate Staphylococcus aureus    Numerous Proteus mirabilis  Organism: Staphylococcus aureus  Proteus mirabilis (09 Nov 2023 18:33)  Organism: Staphylococcus aureus (09 Nov 2023 18:33)  Organism: Proteus mirabilis (09 Nov 2023 18:33)    Culture - Blood (collected 07 Nov 2023 04:30)  Source: .Blood Blood-Peripheral  Preliminary Report (09 Nov 2023 12:01):    No growth at 48 Hours    Culture - Blood (collected 07 Nov 2023 04:15)  Source: .Blood Blood-Peripheral  Preliminary Report (09 Nov 2023 12:01):    No growth at 48 Hours             [facility]  Division of Infectious Diseases  [phone]    Name: SIRENA ENG  Age: 25y  Gender: Male  MRN: [id number]    Interval History--  Notes reviewed.     Past Medical History--  Venous insufficiency        For details regarding the patient's social history, family history, and other miscellaneous elements, please refer the initial infectious diseases consultation and/or the admitting history and physical examination for this admission.    Allergies    No Known Drug Allergies  avocado (Hives; Swelling)  Kiwi (Hives; Swelling)    Intolerances        Medications--  Antibiotics:    Immunologic:    Other:  acetaminophen     Tablet .. PRN  chlorhexidine 2% Cloths  melatonin  nicotine - Inhaler      Review of Systems--  A 10-point review of systems was obtained.     Pertinent positives and negatives--  Constitutional: No fevers. No Chills. No Rigors.   Cardiovascular: No chest pain. No palpitations.  Respiratory: No shortness of breath. No cough.  Gastrointestinal: No nausea or vomiting. No diarrhea or constipation.   Psychiatric: Pleasant. Appropriate affect.    Review of systems otherwise negative except as previously noted.    Physical Examination--  Vital Signs: T(F): 98 (11-09-23 @ 22:10), Max: 98 (11-09-23 @ 17:10)  HR: 97 (11-09-23 @ 22:10)  BP: 125/82 (11-09-23 @ 22:10)  RR: 18 (11-09-23 @ 22:10)  SpO2: 97% (11-09-23 @ 22:10)  Wt(kg): --  General: Nontoxic-appearing Male in no acute distress.  HEENT: AT/NC. PERRL. EOMI. Anicteric. Conjunctiva pink and moist. Oropharynx clear. Dentition fair.  Neck: Not rigid. No sense of mass.  Nodes: None palpable.  Lungs: Clear bilaterally without rales, wheezing or rhonchi  Heart: Regular rate and rhythm. No Murmur. No rub. No gallop. No palpable thrill.  Abdomen: Bowel sounds present and normoactive. Soft. Nondistended. Nontender. No sense of mass. No organomegaly.  Back: No spinal tenderness. No costovertebral angle tenderness.   Extremities: No cyanosis or clubbing. No edema.   Skin: Warm. Dry. Good turgor. No rash. No vasculitic stigmata.  Psychiatric: Appropriate affect and mood for situation.         Laboratory Studies--  CBC                        13.6   8.93  )-----------( 361      ( 09 Nov 2023 07:28 )             42.5       Chemistries  11-09    141  |  105  |  15  ----------------------------<  92  3.9   |  22  |  0.85    Ca    9.3      09 Nov 2023 07:31    TPro  6.8  /  Alb  3.9  /  TBili  0.1<L>  /  DBili  x   /  AST  11  /  ALT  14  /  AlkPhos  78  11-09      Culture Data    Culture - Abscess with Gram Stain (collected 07 Nov 2023 16:27)  Source: .Abscess Leg - Right  Gram Stain (08 Nov 2023 01:08):    No polymorphonuclear cells seen per low power field    Numerous Gram positive cocci in pairs seen per oil power field    Numerous Gram Negative Rods seen per oil power field  Preliminary Report (08 Nov 2023 22:15):    Moderate Staphylococcus aureus    Numerous Proteus mirabilis  Organism: Staphylococcus aureus  Proteus mirabilis (09 Nov 2023 18:33)  Organism: Staphylococcus aureus (09 Nov 2023 18:33)  Organism: Proteus mirabilis (09 Nov 2023 18:33)    Culture - Blood (collected 07 Nov 2023 04:30)  Source: .Blood Blood-Peripheral  Preliminary Report (09 Nov 2023 12:01):    No growth at 48 Hours    Culture - Blood (collected 07 Nov 2023 04:15)  Source: .Blood Blood-Peripheral  Preliminary Report (09 Nov 2023 12:01):    No growth at 48 Hours             [facility]  Division of Infectious Diseases  [phone]    Name: SIRENA ENG  Age: 25y  Gender: Male  MRN: [id number]    Interval History--  Notes reviewed. Seen earlier today. MRI with SQ collection, no deeper pathology.   Mouthpiece to vaping device noted at bedside.       Past Medical History--  Venous insufficiency        For details regarding the patient's social history, family history, and other miscellaneous elements, please refer the initial infectious diseases consultation and/or the admitting history and physical examination for this admission.    Allergies    No Known Drug Allergies  avocado (Hives; Swelling)  Kiwi (Hives; Swelling)    Intolerances        Medications--  Antibiotics:    Immunologic:    Other:  acetaminophen     Tablet .. PRN  chlorhexidine 2% Cloths  melatonin  nicotine - Inhaler      Review of Systems--  A 10-point review of systems was obtained.   Review of systems otherwise unchanged compared to prior visit except as previously noted.    Physical Examination--  Vital Signs: T(F): 98 (11-09-23 @ 22:10), Max: 98 (11-09-23 @ 17:10)  HR: 97 (11-09-23 @ 22:10)  BP: 125/82 (11-09-23 @ 22:10)  RR: 18 (11-09-23 @ 22:10)  SpO2: 97% (11-09-23 @ 22:10)  Wt(kg): --  General: Nontoxic-appearing Male in no acute distress.  HEENT: AT/NC. Anicteric. Conjunctiva pink and moist.   Neck: Not rigid. No sense of mass.  Nodes: None palpable.  Lungs: Clear bilaterally  Heart: Regular rate and rhythm  Abdomen: Bowel sounds present and normoactive. Soft. Nondistended. Nontender. No sense of mass.   Extremities: No cyanosis or clubbing. RLE dressed.   Skin: Warm. Dry. Good turgor. No rash. No vasculitic stigmata.  Psychiatric: Grossly appropriate affect and mood for situation.       Laboratory Studies--  CBC                        13.6   8.93  )-----------( 361      ( 09 Nov 2023 07:28 )             42.5       Chemistries  11-09    141  |  105  |  15  ----------------------------<  92  3.9   |  22  |  0.85    Ca    9.3      09 Nov 2023 07:31    TPro  6.8  /  Alb  3.9  /  TBili  0.1<L>  /  DBili  x   /  AST  11  /  ALT  14  /  AlkPhos  78  11-09      Culture Data  Culture - Abscess with Gram Stain (11.07.23 @ 16:27)    Gram Stain:   No polymorphonuclear cells seen per low power field  Numerous Gram positive cocci in pairs seen per oil power field  Numerous Gram Negative Rods seen per oil power field   -  Amoxicillin/Clavulanic Acid: S <=8/4   -  Ampicillin: S <=8 These ampicillin results predict results for amoxicillin   -  Ampicillin/Sulbactam: S <=4/2   -  Ampicillin/Sulbactam: S <=8/4   -  Aztreonam: S <=4   -  Cefazolin: S <=2   -  Cefazolin: S <=4   -  Cefepime: S <=2   -  Cefoxitin: S <=8   -  Ceftriaxone: S <=1   -  Ciprofloxacin: S <=0.25   -  Clindamycin: R <=0.25 This isolate is presumed to be clindamycin resistant based on detection of inducible resistance. Clindamycin may still be effective in some patients.   -  Ertapenem: S <=0.5   -  Erythromycin: R >4   -  Gentamicin: R >8 Should not be used as monotherapy   -  Gentamicin: S <=2   -  Levofloxacin: S <=0.5   -  Meropenem: S <=1   -  Oxacillin: S <=0.25 Oxacillin predicts susceptibility for dicloxacillin, methicillin, and nafcillin   -  Penicillin: R 2   -  Piperacillin/Tazobactam: S <=8   -  Rifampin: S <=1 Should not be used as monotherapy   -  Tetracycline: S <=1   -  Tobramycin: S <=2   -  Trimethoprim/Sulfamethoxazole: S <=0.5/9.5   -  Trimethoprim/Sulfamethoxazole: S <=0.5/9.5   -  Vancomycin: S 1   Specimen Source: .Abscess Leg - Right   Culture Results:   Moderate Staphylococcus aureus  Numerous Proteus mirabilis   Organism Identification: Staphylococcus aureus  Proteus mirabilis   Organism: Proteus mirabilis   Organism: Staphylococcus aureus   Method Type: JOAO   Method Type: JOAO          Culture - Blood (collected 07 Nov 2023 04:30)  Source: .Blood Blood-Peripheral  Preliminary Report (09 Nov 2023 12:01):    No growth at 48 Hours    Culture - Blood (collected 07 Nov 2023 04:15)  Source: .Blood Blood-Peripheral  Preliminary Report (09 Nov 2023 12:01):    No growth at 48 Hours             [facility]  Division of Infectious Diseases  [phone]    Name: SIRENA ENG  Age: 25y  Gender: Male  MRN: [id number]    Interval History--  Notes reviewed. Seen earlier today. MRI with SQ collection, no deeper pathology.   Mouthpiece to vaping device noted at bedside.       Past Medical History--  Venous insufficiency        For details regarding the patient's social history, family history, and other miscellaneous elements, please refer the initial infectious diseases consultation and/or the admitting history and physical examination for this admission.    Allergies    No Known Drug Allergies  avocado (Hives; Swelling)  Kiwi (Hives; Swelling)    Intolerances        Medications--  Antibiotics:    Immunologic:    Other:  acetaminophen     Tablet .. PRN  chlorhexidine 2% Cloths  melatonin  nicotine - Inhaler      Review of Systems--  A 10-point review of systems was obtained.   Review of systems otherwise unchanged compared to prior visit except as previously noted.    Physical Examination--  Vital Signs: T(F): 98 (11-09-23 @ 22:10), Max: 98 (11-09-23 @ 17:10)  HR: 97 (11-09-23 @ 22:10)  BP: 125/82 (11-09-23 @ 22:10)  RR: 18 (11-09-23 @ 22:10)  SpO2: 97% (11-09-23 @ 22:10)  Wt(kg): --  General: Nontoxic-appearing Male in no acute distress.  HEENT: AT/NC. Anicteric. Conjunctiva pink and moist.   Neck: Not rigid. No sense of mass.  Nodes: None palpable.  Lungs: Clear bilaterally  Heart: Regular rate and rhythm  Abdomen: Bowel sounds present and normoactive. Soft. Nondistended. Nontender. No sense of mass.   Extremities: No cyanosis or clubbing. RLE dressed.   Skin: Warm. Dry. Good turgor. No rash. No vasculitic stigmata.  Psychiatric: Grossly appropriate affect and mood for situation.       Laboratory Studies--  CBC                        13.6   8.93  )-----------( 361      ( 09 Nov 2023 07:28 )             42.5       Chemistries  11-09    141  |  105  |  15  ----------------------------<  92  3.9   |  22  |  0.85    Ca    9.3      09 Nov 2023 07:31    TPro  6.8  /  Alb  3.9  /  TBili  0.1<L>  /  DBili  x   /  AST  11  /  ALT  14  /  AlkPhos  78  11-09      Culture Data  Culture - Abscess with Gram Stain (11.07.23 @ 16:27)    Gram Stain:   No polymorphonuclear cells seen per low power field  Numerous Gram positive cocci in pairs seen per oil power field  Numerous Gram Negative Rods seen per oil power field   -  Amoxicillin/Clavulanic Acid: S <=8/4   -  Ampicillin: S <=8 These ampicillin results predict results for amoxicillin   -  Ampicillin/Sulbactam: S <=4/2   -  Ampicillin/Sulbactam: S <=8/4   -  Aztreonam: S <=4   -  Cefazolin: S <=2   -  Cefazolin: S <=4   -  Cefepime: S <=2   -  Cefoxitin: S <=8   -  Ceftriaxone: S <=1   -  Ciprofloxacin: S <=0.25   -  Clindamycin: R <=0.25 This isolate is presumed to be clindamycin resistant based on detection of inducible resistance. Clindamycin may still be effective in some patients.   -  Ertapenem: S <=0.5   -  Erythromycin: R >4   -  Gentamicin: R >8 Should not be used as monotherapy   -  Gentamicin: S <=2   -  Levofloxacin: S <=0.5   -  Meropenem: S <=1   -  Oxacillin: S <=0.25 Oxacillin predicts susceptibility for dicloxacillin, methicillin, and nafcillin   -  Penicillin: R 2   -  Piperacillin/Tazobactam: S <=8   -  Rifampin: S <=1 Should not be used as monotherapy   -  Tetracycline: S <=1   -  Tobramycin: S <=2   -  Trimethoprim/Sulfamethoxazole: S <=0.5/9.5   -  Trimethoprim/Sulfamethoxazole: S <=0.5/9.5   -  Vancomycin: S 1   Specimen Source: .Abscess Leg - Right   Culture Results:   Moderate Staphylococcus aureus  Numerous Proteus mirabilis   Organism Identification: Staphylococcus aureus  Proteus mirabilis   Organism: Proteus mirabilis   Organism: Staphylococcus aureus   Method Type: JOAO   Method Type: JOAO          Culture - Blood (collected 07 Nov 2023 04:30)  Source: .Blood Blood-Peripheral  Preliminary Report (09 Nov 2023 12:01):    No growth at 48 Hours    Culture - Blood (collected 07 Nov 2023 04:15)  Source: .Blood Blood-Peripheral  Preliminary Report (09 Nov 2023 12:01):    No growth at 48 Hours             [facility]  Division of Infectious Diseases  [phone]    Name: SIRENA ENG  Age: 25y  Gender: Male  MRN: [id number]    Interval History--  Notes reviewed. Seen earlier today. MRI with SQ collection, no deeper pathology.   Mouthpiece to vaping device noted at bedside.       Past Medical History--  Venous insufficiency        For details regarding the patient's social history, family history, and other miscellaneous elements, please refer the initial infectious diseases consultation and/or the admitting history and physical examination for this admission.    Allergies    No Known Drug Allergies  avocado (Hives; Swelling)  Kiwi (Hives; Swelling)    Intolerances        Medications--  Antibiotics:    Immunologic:    Other:  acetaminophen     Tablet .. PRN  chlorhexidine 2% Cloths  melatonin  nicotine - Inhaler      Review of Systems--  A 10-point review of systems was obtained.   Review of systems otherwise unchanged compared to prior visit except as previously noted.    Physical Examination--  Vital Signs: T(F): 98 (11-09-23 @ 22:10), Max: 98 (11-09-23 @ 17:10)  HR: 97 (11-09-23 @ 22:10)  BP: 125/82 (11-09-23 @ 22:10)  RR: 18 (11-09-23 @ 22:10)  SpO2: 97% (11-09-23 @ 22:10)  Wt(kg): --  General: Nontoxic-appearing Male in no acute distress.  HEENT: AT/NC. Anicteric. Conjunctiva pink and moist.   Neck: Not rigid. No sense of mass.  Nodes: None palpable.  Lungs: Clear bilaterally  Heart: Regular rate and rhythm  Abdomen: Bowel sounds present and normoactive. Soft. Nondistended. Nontender. No sense of mass.   Extremities: No cyanosis or clubbing. RLE dressed.   Skin: Warm. Dry. Good turgor. No rash. No vasculitic stigmata.  Psychiatric: Grossly appropriate affect and mood for situation.       Laboratory Studies--  CBC                        13.6   8.93  )-----------( 361      ( 09 Nov 2023 07:28 )             42.5       Chemistries  11-09    141  |  105  |  15  ----------------------------<  92  3.9   |  22  |  0.85    Ca    9.3      09 Nov 2023 07:31    TPro  6.8  /  Alb  3.9  /  TBili  0.1<L>  /  DBili  x   /  AST  11  /  ALT  14  /  AlkPhos  78  11-09      Culture Data  Culture - Abscess with Gram Stain (11.07.23 @ 16:27)    Gram Stain:   No polymorphonuclear cells seen per low power field  Numerous Gram positive cocci in pairs seen per oil power field  Numerous Gram Negative Rods seen per oil power field   -  Amoxicillin/Clavulanic Acid: S <=8/4   -  Ampicillin: S <=8 These ampicillin results predict results for amoxicillin   -  Ampicillin/Sulbactam: S <=4/2   -  Ampicillin/Sulbactam: S <=8/4   -  Aztreonam: S <=4   -  Cefazolin: S <=2   -  Cefazolin: S <=4   -  Cefepime: S <=2   -  Cefoxitin: S <=8   -  Ceftriaxone: S <=1   -  Ciprofloxacin: S <=0.25   -  Clindamycin: R <=0.25 This isolate is presumed to be clindamycin resistant based on detection of inducible resistance. Clindamycin may still be effective in some patients.   -  Ertapenem: S <=0.5   -  Erythromycin: R >4   -  Gentamicin: R >8 Should not be used as monotherapy   -  Gentamicin: S <=2   -  Levofloxacin: S <=0.5   -  Meropenem: S <=1   -  Oxacillin: S <=0.25 Oxacillin predicts susceptibility for dicloxacillin, methicillin, and nafcillin   -  Penicillin: R 2   -  Piperacillin/Tazobactam: S <=8   -  Rifampin: S <=1 Should not be used as monotherapy   -  Tetracycline: S <=1   -  Tobramycin: S <=2   -  Trimethoprim/Sulfamethoxazole: S <=0.5/9.5   -  Trimethoprim/Sulfamethoxazole: S <=0.5/9.5   -  Vancomycin: S 1   Specimen Source: .Abscess Leg - Right   Culture Results:   Moderate Staphylococcus aureus  Numerous Proteus mirabilis   Organism Identification: Staphylococcus aureus  Proteus mirabilis   Organism: Proteus mirabilis   Organism: Staphylococcus aureus   Method Type: JOAO   Method Type: JOAO          Culture - Blood (collected 07 Nov 2023 04:30)  Source: .Blood Blood-Peripheral  Preliminary Report (09 Nov 2023 12:01):    No growth at 48 Hours    Culture - Blood (collected 07 Nov 2023 04:15)  Source: .Blood Blood-Peripheral  Preliminary Report (09 Nov 2023 12:01):    No growth at 48 Hours             [facility]  Division of Infectious Diseases  [phone]    Name: SIRENA ENG  Age: 25y  Gender: Male  MRN: [id number]    Interval History--  Notes reviewed. Seen earlier today. MRI with SQ collection, no deeper pathology.   Mouthpiece to vaping device noted at bedside.       Past Medical History--  Venous insufficiency        For details regarding the patient's social history, family history, and other miscellaneous elements, please refer the initial infectious diseases consultation and/or the admitting history and physical examination for this admission.    Allergies    No Known Drug Allergies  avocado (Hives; Swelling)  Kiwi (Hives; Swelling)    Intolerances        Medications--  Antibiotics:    Immunologic:    Other:  acetaminophen     Tablet .. PRN  chlorhexidine 2% Cloths  melatonin  nicotine - Inhaler      Review of Systems--  A 10-point review of systems was obtained.   Review of systems otherwise unchanged compared to prior visit except as previously noted.    Physical Examination--  Vital Signs: T(F): 98 (11-09-23 @ 22:10), Max: 98 (11-09-23 @ 17:10)  HR: 97 (11-09-23 @ 22:10)  BP: 125/82 (11-09-23 @ 22:10)  RR: 18 (11-09-23 @ 22:10)  SpO2: 97% (11-09-23 @ 22:10)  Wt(kg): --  General: Nontoxic-appearing Male in no acute distress.  HEENT: AT/NC. Anicteric. Conjunctiva pink and moist.   Neck: Not rigid. No sense of mass.  Nodes: None palpable.  Lungs: Clear bilaterally  Heart: Regular rate and rhythm  Abdomen: Bowel sounds present and normoactive. Soft. Nondistended. Nontender. No sense of mass.   Extremities: No cyanosis or clubbing. RLE dressed.   Skin: Warm. Dry. Good turgor. No rash. No vasculitic stigmata.  Psychiatric: Grossly appropriate affect and mood for situation.       Laboratory Studies--  CBC                        13.6   8.93  )-----------( 361      ( 09 Nov 2023 07:28 )             42.5       Chemistries  11-09    141  |  105  |  15  ----------------------------<  92  3.9   |  22  |  0.85    Ca    9.3      09 Nov 2023 07:31    TPro  6.8  /  Alb  3.9  /  TBili  0.1<L>  /  DBili  x   /  AST  11  /  ALT  14  /  AlkPhos  78  11-09    Immunoglobilins- thus far nromal  HIV-1/2 Antigen/Antibody Screen by [facility] (11.09.23 @ 07:31)    HIV-1/2 Combo Result: Nonreact: Nonreactive: HIV-1 p24 antigen and HIV-1/HIV-2 antibodies were not  detected. Nonreactive results may be due to antigen and/or antibody  levels that are below the limit of detection of this assay.  Reactive: Presumptive evidence of HIV-1 p24 antigen and/or HIV-1/HIV-2  antibodies. This is a preliminary result. Further confirmatory testing  according to the CDC/[facility] HIV testing algorithm will follow, and such  confirmatory results must be considered in making a diagnosis related to  HIV infection. Further HIV tests include HIV-1/HIV-2 antibody  differentiation immunoassay and subsequent nucleic acid testing if needed.  This result should be interpreted in conjunction with the patient’s  clinical presentation, history, and other laboratory results. If the  result is inconsistent with clinical evidence, additional testing is  suggested to confirm the result.        Culture Data  Culture - Abscess with Gram Stain (11.07.23 @ 16:27)    Gram Stain:   No polymorphonuclear cells seen per low power field  Numerous Gram positive cocci in pairs seen per oil power field  Numerous Gram Negative Rods seen per oil power field   -  Amoxicillin/Clavulanic Acid: S <=8/4   -  Ampicillin: S <=8 These ampicillin results predict results for amoxicillin   -  Ampicillin/Sulbactam: S <=4/2   -  Ampicillin/Sulbactam: S <=8/4   -  Aztreonam: S <=4   -  Cefazolin: S <=2   -  Cefazolin: S <=4   -  Cefepime: S <=2   -  Cefoxitin: S <=8   -  Ceftriaxone: S <=1   -  Ciprofloxacin: S <=0.25   -  Clindamycin: R <=0.25 This isolate is presumed to be clindamycin resistant based on detection of inducible resistance. Clindamycin may still be effective in some patients.   -  Ertapenem: S <=0.5   -  Erythromycin: R >4   -  Gentamicin: R >8 Should not be used as monotherapy   -  Gentamicin: S <=2   -  Levofloxacin: S <=0.5   -  Meropenem: S <=1   -  Oxacillin: S <=0.25 Oxacillin predicts susceptibility for dicloxacillin, methicillin, and nafcillin   -  Penicillin: R 2   -  Piperacillin/Tazobactam: S <=8   -  Rifampin: S <=1 Should not be used as monotherapy   -  Tetracycline: S <=1   -  Tobramycin: S <=2   -  Trimethoprim/Sulfamethoxazole: S <=0.5/9.5   -  Trimethoprim/Sulfamethoxazole: S <=0.5/9.5   -  Vancomycin: S 1   Specimen Source: .Abscess Leg - Right   Culture Results:   Moderate Staphylococcus aureus  Numerous Proteus mirabilis   Organism Identification: Staphylococcus aureus  Proteus mirabilis   Organism: Proteus mirabilis   Organism: Staphylococcus aureus   Method Type: JOAO   Method Type: JOAO          Culture - Blood (collected 07 Nov 2023 04:30)  Source: .Blood Blood-Peripheral  Preliminary Report (09 Nov 2023 12:01):    No growth at 48 Hours    Culture - Blood (collected 07 Nov 2023 04:15)  Source: .Blood Blood-Peripheral  Preliminary Report (09 Nov 2023 12:01):    No growth at 48 Hours             [facility]  Division of Infectious Diseases  [phone]    Name: SIRENA ENG  Age: 25y  Gender: Male  MRN: [id number]    Interval History--  Notes reviewed. Seen earlier today. MRI with SQ collection, no deeper pathology.   Mouthpiece to vaping device noted at bedside.       Past Medical History--  Venous insufficiency        For details regarding the patient's social history, family history, and other miscellaneous elements, please refer the initial infectious diseases consultation and/or the admitting history and physical examination for this admission.    Allergies    No Known Drug Allergies  avocado (Hives; Swelling)  Kiwi (Hives; Swelling)    Intolerances        Medications--  Antibiotics:    Immunologic:    Other:  acetaminophen     Tablet .. PRN  chlorhexidine 2% Cloths  melatonin  nicotine - Inhaler      Review of Systems--  A 10-point review of systems was obtained.   Review of systems otherwise unchanged compared to prior visit except as previously noted.    Physical Examination--  Vital Signs: T(F): 98 (11-09-23 @ 22:10), Max: 98 (11-09-23 @ 17:10)  HR: 97 (11-09-23 @ 22:10)  BP: 125/82 (11-09-23 @ 22:10)  RR: 18 (11-09-23 @ 22:10)  SpO2: 97% (11-09-23 @ 22:10)  Wt(kg): --  General: Nontoxic-appearing Male in no acute distress.  HEENT: AT/NC. Anicteric. Conjunctiva pink and moist.   Neck: Not rigid. No sense of mass.  Nodes: None palpable.  Lungs: Clear bilaterally  Heart: Regular rate and rhythm  Abdomen: Bowel sounds present and normoactive. Soft. Nondistended. Nontender. No sense of mass.   Extremities: No cyanosis or clubbing. RLE dressed.   Skin: Warm. Dry. Good turgor. No rash. No vasculitic stigmata.  Psychiatric: Grossly appropriate affect and mood for situation.       Laboratory Studies--  CBC                        13.6   8.93  )-----------( 361      ( 09 Nov 2023 07:28 )             42.5       Chemistries  11-09    141  |  105  |  15  ----------------------------<  92  3.9   |  22  |  0.85    Ca    9.3      09 Nov 2023 07:31    TPro  6.8  /  Alb  3.9  /  TBili  0.1<L>  /  DBili  x   /  AST  11  /  ALT  14  /  AlkPhos  78  11-09    Immunoglobilins- thus far nromal  HIV-1/2 Antigen/Antibody Screen by [facility] (11.09.23 @ 07:31)    HIV-1/2 Combo Result: Nonreact: Nonreactive: HIV-1 p24 antigen and HIV-1/HIV-2 antibodies were not  detected. Nonreactive results may be due to antigen and/or antibody  levels that are below the limit of detection of this assay.  Reactive: Presumptive evidence of HIV-1 p24 antigen and/or HIV-1/HIV-2  antibodies. This is a preliminary result. Further confirmatory testing  according to the CDC/[facility] HIV testing algorithm will follow, and such  confirmatory results must be considered in making a diagnosis related to  HIV infection. Further HIV tests include HIV-1/HIV-2 antibody  differentiation immunoassay and subsequent nucleic acid testing if needed.  This result should be interpreted in conjunction with the patient’s  clinical presentation, history, and other laboratory results. If the  result is inconsistent with clinical evidence, additional testing is  suggested to confirm the result.        Culture Data  Culture - Abscess with Gram Stain (11.07.23 @ 16:27)    Gram Stain:   No polymorphonuclear cells seen per low power field  Numerous Gram positive cocci in pairs seen per oil power field  Numerous Gram Negative Rods seen per oil power field   -  Amoxicillin/Clavulanic Acid: S <=8/4   -  Ampicillin: S <=8 These ampicillin results predict results for amoxicillin   -  Ampicillin/Sulbactam: S <=4/2   -  Ampicillin/Sulbactam: S <=8/4   -  Aztreonam: S <=4   -  Cefazolin: S <=2   -  Cefazolin: S <=4   -  Cefepime: S <=2   -  Cefoxitin: S <=8   -  Ceftriaxone: S <=1   -  Ciprofloxacin: S <=0.25   -  Clindamycin: R <=0.25 This isolate is presumed to be clindamycin resistant based on detection of inducible resistance. Clindamycin may still be effective in some patients.   -  Ertapenem: S <=0.5   -  Erythromycin: R >4   -  Gentamicin: R >8 Should not be used as monotherapy   -  Gentamicin: S <=2   -  Levofloxacin: S <=0.5   -  Meropenem: S <=1   -  Oxacillin: S <=0.25 Oxacillin predicts susceptibility for dicloxacillin, methicillin, and nafcillin   -  Penicillin: R 2   -  Piperacillin/Tazobactam: S <=8   -  Rifampin: S <=1 Should not be used as monotherapy   -  Tetracycline: S <=1   -  Tobramycin: S <=2   -  Trimethoprim/Sulfamethoxazole: S <=0.5/9.5   -  Trimethoprim/Sulfamethoxazole: S <=0.5/9.5   -  Vancomycin: S 1   Specimen Source: .Abscess Leg - Right   Culture Results:   Moderate Staphylococcus aureus  Numerous Proteus mirabilis   Organism Identification: Staphylococcus aureus  Proteus mirabilis   Organism: Proteus mirabilis   Organism: Staphylococcus aureus   Method Type: JOAO   Method Type: JOAO          Culture - Blood (collected 07 Nov 2023 04:30)  Source: .Blood Blood-Peripheral  Preliminary Report (09 Nov 2023 12:01):    No growth at 48 Hours    Culture - Blood (collected 07 Nov 2023 04:15)  Source: .Blood Blood-Peripheral  Preliminary Report (09 Nov 2023 12:01):    No growth at 48 Hours             [facility]  Division of Infectious Diseases  [phone]    Name: SIRENA ENG  Age: 25y  Gender: Male  MRN: [id number]    Interval History--  Notes reviewed. Seen earlier today. MRI with SQ collection, no deeper pathology.   Mouthpiece to vaping device noted at bedside.       Past Medical History--  Venous insufficiency        For details regarding the patient's social history, family history, and other miscellaneous elements, please refer the initial infectious diseases consultation and/or the admitting history and physical examination for this admission.    Allergies    No Known Drug Allergies  avocado (Hives; Swelling)  Kiwi (Hives; Swelling)    Intolerances        Medications--  Antibiotics:    Immunologic:    Other:  acetaminophen     Tablet .. PRN  chlorhexidine 2% Cloths  melatonin  nicotine - Inhaler      Review of Systems--  A 10-point review of systems was obtained.   Review of systems otherwise unchanged compared to prior visit except as previously noted.    Physical Examination--  Vital Signs: T(F): 98 (11-09-23 @ 22:10), Max: 98 (11-09-23 @ 17:10)  HR: 97 (11-09-23 @ 22:10)  BP: 125/82 (11-09-23 @ 22:10)  RR: 18 (11-09-23 @ 22:10)  SpO2: 97% (11-09-23 @ 22:10)  Wt(kg): --  General: Nontoxic-appearing Male in no acute distress.  HEENT: AT/NC. Anicteric. Conjunctiva pink and moist.   Neck: Not rigid. No sense of mass.  Nodes: None palpable.  Lungs: Clear bilaterally  Heart: Regular rate and rhythm  Abdomen: Bowel sounds present and normoactive. Soft. Nondistended. Nontender. No sense of mass.   Extremities: No cyanosis or clubbing. RLE dressed.   Skin: Warm. Dry. Good turgor. No rash. No vasculitic stigmata.  Psychiatric: Grossly appropriate affect and mood for situation.       Laboratory Studies--  CBC                        13.6   8.93  )-----------( 361      ( 09 Nov 2023 07:28 )             42.5       Chemistries  11-09    141  |  105  |  15  ----------------------------<  92  3.9   |  22  |  0.85    Ca    9.3      09 Nov 2023 07:31    TPro  6.8  /  Alb  3.9  /  TBili  0.1<L>  /  DBili  x   /  AST  11  /  ALT  14  /  AlkPhos  78  11-09    Immunoglobilins- thus far nromal  HIV-1/2 Antigen/Antibody Screen by [facility] (11.09.23 @ 07:31)    HIV-1/2 Combo Result: Nonreact: Nonreactive: HIV-1 p24 antigen and HIV-1/HIV-2 antibodies were not  detected. Nonreactive results may be due to antigen and/or antibody  levels that are below the limit of detection of this assay.  Reactive: Presumptive evidence of HIV-1 p24 antigen and/or HIV-1/HIV-2  antibodies. This is a preliminary result. Further confirmatory testing  according to the CDC/[facility] HIV testing algorithm will follow, and such  confirmatory results must be considered in making a diagnosis related to  HIV infection. Further HIV tests include HIV-1/HIV-2 antibody  differentiation immunoassay and subsequent nucleic acid testing if needed.  This result should be interpreted in conjunction with the patient’s  clinical presentation, history, and other laboratory results. If the  result is inconsistent with clinical evidence, additional testing is  suggested to confirm the result.        Culture Data  Culture - Abscess with Gram Stain (11.07.23 @ 16:27)    Gram Stain:   No polymorphonuclear cells seen per low power field  Numerous Gram positive cocci in pairs seen per oil power field  Numerous Gram Negative Rods seen per oil power field   -  Amoxicillin/Clavulanic Acid: S <=8/4   -  Ampicillin: S <=8 These ampicillin results predict results for amoxicillin   -  Ampicillin/Sulbactam: S <=4/2   -  Ampicillin/Sulbactam: S <=8/4   -  Aztreonam: S <=4   -  Cefazolin: S <=2   -  Cefazolin: S <=4   -  Cefepime: S <=2   -  Cefoxitin: S <=8   -  Ceftriaxone: S <=1   -  Ciprofloxacin: S <=0.25   -  Clindamycin: R <=0.25 This isolate is presumed to be clindamycin resistant based on detection of inducible resistance. Clindamycin may still be effective in some patients.   -  Ertapenem: S <=0.5   -  Erythromycin: R >4   -  Gentamicin: R >8 Should not be used as monotherapy   -  Gentamicin: S <=2   -  Levofloxacin: S <=0.5   -  Meropenem: S <=1   -  Oxacillin: S <=0.25 Oxacillin predicts susceptibility for dicloxacillin, methicillin, and nafcillin   -  Penicillin: R 2   -  Piperacillin/Tazobactam: S <=8   -  Rifampin: S <=1 Should not be used as monotherapy   -  Tetracycline: S <=1   -  Tobramycin: S <=2   -  Trimethoprim/Sulfamethoxazole: S <=0.5/9.5   -  Trimethoprim/Sulfamethoxazole: S <=0.5/9.5   -  Vancomycin: S 1   Specimen Source: .Abscess Leg - Right   Culture Results:   Moderate Staphylococcus aureus  Numerous Proteus mirabilis   Organism Identification: Staphylococcus aureus  Proteus mirabilis   Organism: Proteus mirabilis   Organism: Staphylococcus aureus   Method Type: JOAO   Method Type: JOAO          Culture - Blood (collected 07 Nov 2023 04:30)  Source: .Blood Blood-Peripheral  Preliminary Report (09 Nov 2023 12:01):    No growth at 48 Hours    Culture - Blood (collected 07 Nov 2023 04:15)  Source: .Blood Blood-Peripheral  Preliminary Report (09 Nov 2023 12:01):    No growth at 48 Hours

## 2023-11-21 ENCOUNTER — APPOINTMENT (OUTPATIENT)
Dept: CARDIOLOGY | Facility: MEDICAL CENTER | Age: 58
End: 2023-11-21
Attending: NURSE PRACTITIONER
Payer: MEDICARE

## 2023-11-28 ENCOUNTER — OFFICE VISIT (OUTPATIENT)
Dept: CARDIOLOGY | Facility: MEDICAL CENTER | Age: 58
End: 2023-11-28
Attending: PHYSICIAN ASSISTANT
Payer: MEDICARE

## 2023-11-28 VITALS
DIASTOLIC BLOOD PRESSURE: 62 MMHG | WEIGHT: 299 LBS | SYSTOLIC BLOOD PRESSURE: 110 MMHG | HEART RATE: 81 BPM | BODY MASS INDEX: 40.5 KG/M2 | OXYGEN SATURATION: 98 % | HEIGHT: 72 IN | RESPIRATION RATE: 18 BRPM

## 2023-11-28 DIAGNOSIS — E78.2 MIXED HYPERLIPIDEMIA: ICD-10-CM

## 2023-11-28 DIAGNOSIS — E11.65 TYPE 2 DIABETES MELLITUS WITH HYPERGLYCEMIA, UNSPECIFIED WHETHER LONG TERM INSULIN USE (HCC): ICD-10-CM

## 2023-11-28 DIAGNOSIS — I48.11 LONGSTANDING PERSISTENT ATRIAL FIBRILLATION (HCC): ICD-10-CM

## 2023-11-28 DIAGNOSIS — D64.9 NORMOCYTIC ANEMIA: ICD-10-CM

## 2023-11-28 DIAGNOSIS — Z79.899 HIGH RISK MEDICATION USE: ICD-10-CM

## 2023-11-28 DIAGNOSIS — I50.32 CHRONIC HEART FAILURE WITH PRESERVED EJECTION FRACTION (HCC): ICD-10-CM

## 2023-11-28 DIAGNOSIS — I42.8 NONISCHEMIC CARDIOMYOPATHY (HCC): ICD-10-CM

## 2023-11-28 DIAGNOSIS — I87.2 CHRONIC VENOUS INSUFFICIENCY OF LOWER EXTREMITY: ICD-10-CM

## 2023-11-28 DIAGNOSIS — Z01.810 PRE-OPERATIVE CARDIOVASCULAR EXAMINATION: ICD-10-CM

## 2023-11-28 DIAGNOSIS — I48.21 PERMANENT ATRIAL FIBRILLATION (HCC): ICD-10-CM

## 2023-11-28 DIAGNOSIS — I42.8 NON-ISCHEMIC CARDIOMYOPATHY (HCC): ICD-10-CM

## 2023-11-28 DIAGNOSIS — D64.9 ANEMIA, UNSPECIFIED TYPE: ICD-10-CM

## 2023-11-28 DIAGNOSIS — I87.2 VENOUS STASIS DERMATITIS OF BOTH LOWER EXTREMITIES: ICD-10-CM

## 2023-11-28 PROCEDURE — 99214 OFFICE O/P EST MOD 30 MIN: CPT | Performed by: PHYSICIAN ASSISTANT

## 2023-11-28 PROCEDURE — 3074F SYST BP LT 130 MM HG: CPT | Performed by: PHYSICIAN ASSISTANT

## 2023-11-28 PROCEDURE — 99213 OFFICE O/P EST LOW 20 MIN: CPT | Performed by: PHYSICIAN ASSISTANT

## 2023-11-28 PROCEDURE — 3078F DIAST BP <80 MM HG: CPT | Performed by: PHYSICIAN ASSISTANT

## 2023-11-28 ASSESSMENT — FIBROSIS 4 INDEX: FIB4 SCORE: 2.19

## 2023-11-28 NOTE — PROGRESS NOTES
Chief Complaint   Patient presents with    Atrial Fibrillation     F/v dx: Longstanding persistent atrial fibrillation (HCC)    Congestive Heart Failure     F/v dx: Chronic diastolic heart failure (HCC)    Hyperlipidemia       Subjective     Sebastián Castro is a 58 y.o. male with a history of persistent A-fib, nonischemic cardiomyopathy, diabetes mellitus, hyperlipidemia, chronic venous insufficiency who presents today for follow-up.     His primary cardiologist is Dr. Larson.  Last seen by me 6/22/2023.  At that exam, he was doing well and did not have any cardiac complaints.  He remained on 2L of supplemental O2 which was his baseline.  He was slightly volume up on exam and had reported 20 pound weight gain so he was started on Lasix as needed with repeat labs.  He was to follow-up in 3 months.  11/4/2023 he presented to the emergency department after a dental extraction with ongoing bleeding for 2 days.  He received a blood transfusion.  He developed melanotic stools and GI was consulted who performed an EGD which demonstrated only mild gastritis and no source of bleeding.  He was discharged in stable condition.    Today, he reports he is feeling well. He is due to see the GI specialist who wishes to do a colonoscope though he notes that his melena has resolved. He also reports intentional significant weight loss and that his lower extremity edema has been almost nonexistent lately. No chest pain or palpitations. No shortness of breath, dyspnea on exertion, orthopnea or PND. No lower extremity edema. No dizziness or lightheadedness. No syncope or presyncope.      He lives at [facility]. His blood pressure has been in the 120s/70s.  Past Medical History:   Diagnosis Date    A-fib (HCC)     Abscess 11/8/2020    Bacteremia 11/8/2020    Chest pain     Congestive heart failure (HCC)     Diabetes (HCC)     Diabetic neuropathy (HCC)     Hypercholesterolemia     Hypertension     Longstanding  persistent atrial fibrillation (HCC) 11/8/2020    LV dysfunction 11/8/2020    Morbid obesity (HCC)     NICM (nonischemic cardiomyopathy) (HCC) 11/8/2020    Noncompliance     Normal cardiac stress test     Orthostatic hypotension     Sepsis (HCC) 11/8/2020     Past Surgical History:   Procedure Laterality Date    CA UPPER GI ENDOSCOPY,DIAGNOSIS N/A 11/6/2023    Procedure: GASTROSCOPY;  Surgeon: Handy Pereyra M.D.;  Location: SURGERY SAME DAY [facility];  Service: Gastroenterology    CA EXPLORATORY OF ABDOMEN  5/27/2021    Procedure: LAPAROTOMY, EXPLORATORY;  Surgeon: Kin Desouza D.O.;  Location: [facility];  Service: General    COLOSTOMY TAKEDOWN  5/27/2021    Procedure: COLOSTOMY TAKEDOWN AND CLOSURE;  Surgeon: Kin Desouza D.O.;  Location: SURGERY [facility];  Service: General    COLECTOMY N/A 11/10/2020    Procedure: COLECTOMY-SEGMENTAL, COLOSTOMY, MOBILIZATION OF SPLENIC FLEXURE, WOUND VAC PLACEMENT, IRRIGATION AND DEBRIDMENT FLANK WOUND;  Surgeon: Kin Desouza D.O.;  Location: SURGERY [facility];  Service: General    ZZZ CARDIAC CATH  07/21/2018    EF 45%, normal coronaries.    IRRIGATION & DEBRIDEMENT ORTHO Right 2/19/2018    Procedure: IRRIGATION & DEBRIDEMENT ORTHO-FOOT;  Surgeon: Kirby Lopez M.D.;  Location: [facility];  Service: Orthopedics    TOE AMPUTATION Right 1/17/2018    Procedure: TOE AMPUTATION-1ST RAY;  Surgeon: Doug Delong M.D.;  Location: [facility];  Service: Orthopedics    TOE AMPUTATION Right 11/10/2017    Procedure: TOE AMPUTATION;  Surgeon: Osorio Leo M.D.;  Location: [facility];  Service: Orthopedics     Family History   Problem Relation Age of Onset    No Known Problems Mother     No Known Problems Father      Social History     Socioeconomic History    Marital status:      Spouse name: Not on file    Number of children: Not on file    Years of education: Not on file    Highest education level: Not on file    Occupational History    Not on file   Tobacco Use    Smoking status: Never    Smokeless tobacco: Never   Vaping Use    Vaping Use: Never used   Substance and Sexual Activity    Alcohol use: No    Drug use: No    Sexual activity: Not on file   Other Topics Concern    Not on file   Social History Narrative    Not on file     Social Determinants of Health     Financial Resource Strain: Low Risk  (10/21/2019)    Overall Financial Resource Strain (CARDIA)     Difficulty of Paying Living Expenses: Not hard at all   Food Insecurity: No Food Insecurity (10/21/2019)    Hunger Vital Sign     Worried About Running Out of Food in the Last Year: Never true     Ran Out of Food in the Last Year: Never true   Transportation Needs: No Transportation Needs (10/21/2019)    PRAPARE - Transportation     Lack of Transportation (Medical): No     Lack of Transportation (Non-Medical): No   Physical Activity: Not on file   Stress: Not on file   Social Connections: Not on file   Intimate Partner Violence: Not on file   Housing Stability: Not on file     Allergies   Allergen Reactions    Zosyn [Piperacillin Sod-Tazobactam So] Rash and Itching     Redness/flushed throughout body.     Daptomycin Rash     Body rash  RXN=1/2018      Pcn [Penicillins] Hives and Rash      Rash & hives  RXN=since a kid  Tolerates cephalosporins    Unasyn [Ampicillin-Sulbactam Sodium] Hives, Itching and Swelling     Received doses as recently as 2017 without incident. On 2/18/2018 developed a rash after receiving Unasyn.    Vancomycin Rash     Red man syndrome. Tolerates IV vancomycin at reduced infusion rate.     Outpatient Encounter Medications as of 11/28/2023   Medication Sig Dispense Refill    loperamide (IMODIUM) 2 MG Cap Take 2 mg by mouth 4 times a day as needed for Diarrhea.      Magnesium Oxide (MAG-OX PO) Take  by mouth.      furosemide (LASIX) 20 MG Tab Take 1 Tablet by mouth as needed (Take 1 as needed for any wieght gain of 3lbs on a day or 5lbs in a  week or any increased lower leg swelling or shortness of breath.). 30 Tablet 3    dapagliflozin propanediol (FARXIGA) 10 MG Tab Take 1 Tablet by mouth every day. 30 Tablet 11    metoprolol SR (TOPROL XL) 100 MG TABLET SR 24 HR Take 1 Tablet by mouth every day.      spironolactone (ALDACTONE) 25 MG Tab Take 1 Tablet by mouth every day. 30 Tablet 3    therapeutic multivitamin-minerals (THERAGRAN-M) Tab Take 1 Tablet by mouth every day.      PARoxetine (PAXIL) 30 MG Tab Take 30 mg by mouth every day.      ROPINIRole (REQUIP) 0.25 MG Tab Take 0.5 mg by mouth at bedtime.      traZODone (DESYREL) 50 MG Tab Take 50 mg by mouth every evening.      ziprasidone (GEODON) 40 MG Cap Take 40 mg by mouth 2 times a day.      apixaban (ELIQUIS) 5mg Tab Take 1 Tablet by mouth 2 times a day. Indications: Thromboembolism secondary to Atrial Fibrillation 180 Tablet 3    clonazePAM (KLONOPIN) 0.5 MG Tab Take  by mouth 2 times a day.      levothyroxine (SYNTHROID) 150 MCG Tab Take 1 tablet by mouth every morning on an empty stomach. 30 tablet     tamsulosin (FLOMAX) 0.4 MG capsule Take 1 capsule by mouth 1/2 hour after breakfast. 30 capsule     omeprazole (PRILOSEC) 40 MG delayed-release capsule Take 1 capsule by mouth every 12 hours. 30 capsule     vitamin D (VITAMIND D3) 1000 UNIT Tab Take 1 tablet by mouth every day. 60 tablet      No facility-administered encounter medications on file as of 11/28/2023.     Review of Systems   Constitutional:  Positive for weight loss (intentional). Negative for chills, fever and malaise/fatigue.   HENT: Negative.     Eyes: Negative.  Negative for blurred vision and double vision.   Respiratory:  Negative for cough and shortness of breath.    Cardiovascular:  Negative for chest pain, palpitations, orthopnea, claudication, leg swelling and PND.   Gastrointestinal: Negative.  Negative for abdominal pain, nausea and vomiting.   Genitourinary: Negative.    Musculoskeletal: Negative.  Negative for myalgias.    Skin: Negative.  Negative for rash.   Neurological: Negative.  Negative for dizziness, loss of consciousness, weakness and headaches.   Endo/Heme/Allergies: Negative.  Does not bruise/bleed easily.   Psychiatric/Behavioral: Negative.                Objective     /62 (BP Location: Left arm, Patient Position: Sitting, BP Cuff Size: Adult)   Pulse 81   Resp 18   Ht 1.829 m (6')   Wt (!) 136 kg (299 lb)   SpO2 98%   BMI 40.55 kg/m²     Physical Exam  Vitals reviewed.   Constitutional:       General: He is not in acute distress.     Appearance: He is obese.      Comments: In a wheelchair   HENT:      Head: Normocephalic and atraumatic.      Right Ear: External ear normal.      Left Ear: External ear normal.   Eyes:      General: No scleral icterus.     Extraocular Movements: Extraocular movements intact.      Conjunctiva/sclera: Conjunctivae normal.      Pupils: Pupils are equal, round, and reactive to light.   Cardiovascular:      Rate and Rhythm: Normal rate. Rhythm irregular.      Pulses: Normal pulses.      Heart sounds: Normal heart sounds. No murmur heard.     No friction rub. No gallop.   Pulmonary:      Effort: Pulmonary effort is normal.      Breath sounds: Normal breath sounds.   Abdominal:      General: Bowel sounds are normal.      Palpations: Abdomen is soft.      Tenderness: There is no abdominal tenderness.   Musculoskeletal:         General: Normal range of motion.      Cervical back: Normal range of motion and neck supple.      Right lower leg: No edema (trace).      Left lower leg: No edema (trace).   Skin:     General: Skin is warm and dry.      Capillary Refill: Capillary refill takes less than 2 seconds.   Neurological:      General: No focal deficit present.      Mental Status: He is alert and oriented to person, place, and time.   Psychiatric:         Mood and Affect: Mood normal.         Behavior: Behavior normal.         Judgment: Judgment normal.       Lab Results   Component Value  Date/Time    CHOLSTRLTOT 146 02/14/2021 01:30 AM    LDL 82 02/14/2021 01:30 AM    HDL 32 (A) 02/14/2021 01:30 AM    TRIGLYCERIDE 160 (H) 02/14/2021 01:30 AM       Lab Results   Component Value Date/Time    SODIUM 139 11/07/2023 07:05 AM    POTASSIUM 3.4 (L) 11/07/2023 07:05 AM    CHLORIDE 100 11/07/2023 07:05 AM    CO2 31 11/07/2023 07:05 AM    GLUCOSE 99 11/07/2023 07:05 AM    BUN 28 (H) 11/07/2023 07:05 AM    CREATININE 1.28 11/07/2023 07:05 AM     Lab Results   Component Value Date/Time    ALKPHOSPHAT 77 11/04/2023 11:39 AM    ASTSGOT 20 11/04/2023 11:39 AM    ALTSGPT 10 11/04/2023 11:39 AM    TBILIRUBIN 0.9 11/04/2023 11:39 AM       Labs reviewed from [employer] and scanned into media.    Cardiovascular imaging and procedures:     Echocardiogram 11/5/2022  CONCLUSIONS  Severely reduced left ventricular systolic function.  The left ventricular ejection fraction is visually estimated to be 25-  30%, but may be underestimated due to tachycardia.  Global hypokinesis with more pronounced hypokinesis of the anterior and   anterolateral walls.  Diastolic function is abnormal, but grade cannot be determined.  The right ventricle is not well visualized, but appears dilated with   reduced systolic function.  Biatrial dilation.  Mild to moderate tricuspid regurgitation.  Dilated inferior vena cava without inspiratory collapse     Echocardiogram 12/23/2022  CONCLUSIONS  The left ventricular ejection fraction is visually estimated to be 60%.  Right ventricular systolic pressure is estimated to be 31 mmHg.  Left atrial volume index is 60 mL/sq m.  Moderate aortic insufficiency.             Assessment & Plan     1. Longstanding persistent atrial fibrillation (HCC)  CBC WITH DIFFERENTIAL      2. Anemia, unspecified type        3. Normocytic anemia  CBC WITH DIFFERENTIAL      4. Permanent atrial fibrillation (HCC)  Comp Metabolic Panel      5. Mixed hyperlipidemia  Lipid Profile      6. Nonischemic cardiomyopathy (HCC)  Comp  Metabolic Panel      7. High risk medication use        8. Chronic venous insufficiency of lower extremity        9. Chronic heart failure with preserved ejection fraction (HCC)        10. Non-ischemic cardiomyopathy (HCC)        11. Type 2 diabetes mellitus with hyperglycemia, unspecified whether long term insulin use (HCC)        12. Venous stasis dermatitis of both lower extremities        13. Pre-operative cardiovascular examination            Medical Decision Making: Today's Assessment/Status/Plan:        Persistent A-fib  - Asymptomatic  - Rate is well controlled  - Continue eliquis 5mg BID for CVA prevention. Hold for 4 days prior to colonoscopy and resume 24 hours post procedure or when hemodynamically stable.   - Continue metoprolol 100mg daily  - Followed by EP     Nonischemic cardiomyopathy  Hyperlipidemia  - EF is recovered to 60%  - Continue lasix 20mg PRN.   - Repeat CMP and lipid panel  - Continue metoprolol 100mg daily     Chronic Venous Insuffiencey  - Marked improvement over the past few months.   - Encouraged low salt diet and leg elevation.  - Recommended compression stocking use.  - Continue lasix per above.   - Continue daily weights.     Diabetes mellitus  - Well controlled  - Last A1C 6.0  - Continue farxiga    Preoperative risk stratification  - He is doing well from a cardiac standpoint.  - He may proceed with colonoscopy as moderate risk. The procedure should be in a hospital setting. Hold anticoagulation for 4 days prior to procedure and resume when hemostasis is achieved.     Anemia  - Hemoglobin 8.4 and hematocrit 27.1 on recent labs after bleeding event.  - Repeat CBC  - Tolerating currently, but can consider watchman if cannot tolerate anticoagulation.     Follow up in 3 months.    I personally spent a total of 32 minutes which includes face-to-face time and non-face-to-face time spent on preparing to see the patient, reviewing hospital notes and tests, obtaining history from the  patient, performing a medically appropriate exam, counseling and educating the patient, ordering medications/tests/procedures/referrals as clinically indicated, and documenting information in the electronic medical record.

## 2023-12-01 ASSESSMENT — ENCOUNTER SYMPTOMS
ABDOMINAL PAIN: 0
DIZZINESS: 0
NAUSEA: 0
MYALGIAS: 0
COUGH: 0
EYES NEGATIVE: 1
DOUBLE VISION: 0
PSYCHIATRIC NEGATIVE: 1
CHILLS: 0
MUSCULOSKELETAL NEGATIVE: 1
VOMITING: 0
GASTROINTESTINAL NEGATIVE: 1
PND: 0
FEVER: 0
PALPITATIONS: 0
WEIGHT LOSS: 1
CLAUDICATION: 0
BLURRED VISION: 0
HEADACHES: 0
ORTHOPNEA: 0
NEUROLOGICAL NEGATIVE: 1
SHORTNESS OF BREATH: 0
WEAKNESS: 0
BRUISES/BLEEDS EASILY: 0
LOSS OF CONSCIOUSNESS: 0

## 2023-12-04 ENCOUNTER — TELEPHONE (OUTPATIENT)
Dept: CARDIOLOGY | Facility: MEDICAL CENTER | Age: 58
End: 2023-12-04
Payer: MEDICARE

## 2023-12-04 NOTE — TELEPHONE ENCOUNTER
Last OV: 11.28.2023  Proposed Surgery: colonoscopy   Surgery Date: 01.16.2024  Requesting Office Name: GI   Fax Number: [phone]  Preference of Location (default is surgery center unless specified by Cardiologist or EDDI)  Prior Clearance Addressed: Yes - Continue eliquis 5mg BID for CVA prevention. Hold for 4 days prior to colonoscopy and resume 24 hours post procedure or when hemodynamically stable.        Anticoags/Antiplatelets: Apixaban   Outstanding Cardiac Imaging : No  Stent, Cardiac Devices, or Catheterization: No  Ablation, TAVR/Valve (including open heart), Cardioversion: No  Recent Cardiac Hospitalization: No            When: N/A  History (cardiac history):   Past Medical History:   Diagnosis Date    A-fib (HCC)     Abscess 11/8/2020    Bacteremia 11/8/2020    Chest pain     Congestive heart failure (HCC)     Diabetes (HCC)     Diabetic neuropathy (HCC)     Hypercholesterolemia     Hypertension     Longstanding persistent atrial fibrillation ([facility]) 11/8/2020    LV dysfunction 11/8/2020    Morbid obesity ([facility])     NICM (nonischemic cardiomyopathy) ([facility]) 11/8/2020    Noncompliance     Normal cardiac stress test     Orthostatic hypotension     Sepsis ([facility]) 11/8/2020             Surgical Clearance Letter Sent: YES   **Scan clearance request letter into [facility].**

## 2023-12-04 NOTE — LETTER
PROCEDURE/SURGERY CLEARANCE FORM      Encounter Date: 12/4/2023    Patient: Sebastián Castro  YOB: 1965    CARDIOLOGIST:  Zenia Stratton, Student    REFERRING DOCTOR:  No ref. provider found    The above patient is cleared to have the following procedure/surgery: colonoscopy                                            Additional comments: - Continue eliquis 5mg BID for CVA prevention. Hold for 4 days prior to colonoscopy and resume 24 hours post procedure or when hemodynamically stable.        Electronically signed        MD Signature   ASHOK Jennings

## 2024-01-02 NOTE — WOUND TEAM
"[facility]   Inpatient Services   Established Ostomy Management/ troubleshooting  HPI: Reviewed  PMH: Reviewed   SH: Reviewed   Reason for Ostomy nurse consult:  Established colostomy    Subjective: \".\"  Colostomy 11/10/20 Transverse LUQ (Active)   Wound Image   02/24/21 0900   Stomal Appliance Assessment Changed    Stoma Assessment Pink    Stoma Shape Oval;Budded Less Than One Inch    Stoma Size (in) 1.25    Peristomal Assessment Pink    Mucocutaneous Junction Intact    Treatment Cleansed with water/washcloth;Site care;Appliance Changed    Peristomal Protectant No Sting Skin Prep;Stoma Powder;Paste Ring    Stomal Appliance 2 1/4\" (57mm) CTF;Paste Ring, 2\"    Output (mL) 150 mL    Output Color Brown    WOUND RN ONLY - Stomal Appliance  2 Piece;Paste Ring, 2\";2 1/4\" (57mm) CTF    Appliance Brand [place]                      Ostomy Appliance (type and size): 2 1/4 2 piece with Paste Ring     Interventions and Education: Pt declined to do anything today. Pt kept turning head away from ostomy. This RN removed appliance. Cleansed peristomal skin with warm wash cloth. Crusted x1 on stoma powder and no sting. New template created. Barrier cut according to template, paste ring applied around opening then barrier applied around stoma. Pouch applied and end of bag closed.      Evaluation:   2/26: Pt very down today, reports that psych has not seen him. Pt kept turning head away from ostomy today but did verbalized he will try to do hands on Sunday.     2/24/21: Pt unable to transfer to inpatient [facility] until he can care for colostomy independently. Ostomy RNs were asked to return for education. Pt extremely depressed today. Pt states he feels like an animal and that the colostomy is messy and smelly and he would rather die then care for it. This RN provided understanding and attempted to explain that colostomy is manageable. Stoma size is done changing and therefore pt can use template in ostomy bag to trace " and cut barrier and apply paste ring prior to removing old appliance to limit time stoma is exposed as pt has extreme body dysmorphia related to ostomy. Pt had colostomy created on November 10, 2020 by Dr. Desouza. May want to consider contacting surgery for possible reversal related to pts extreme body dysmorphia (this is pts 2nd admit for SI related to colostomy) and inability for pt to DC due to pts inability/unwillingness to care for colostomy - Discussed with Dr. Ley ([facility] Resident).     2/16/21 Patient with established colostomy, home health nurse was completing care.  Unclear DC plan at this time however skin is greatly improved with no redness or irritation noted.  Nursing to continue with changes every 3 days.         Plan: wound team to sign off, nursing to re-consult with any concerns.    Anticipated discharge needs: patient would benefit from SNF or Home health for follow up care.        1

## 2024-03-06 NOTE — ASSESSMENT & PLAN NOTE
Blood cultures positive for streptococcus  ID following, appreciate recs   Continue IV Ancef 2g  Pending L elbow MRI - consider possible septic joint as source. Consider orthopedic surgery consult once imaging results  Recent TTE negative for endocarditis  
Blood cultures positive for streptococcus, source unclear  Recent TTE negative for endocarditis  ID consulted and following          Continue IV Ancef 2g q 8 hrs   I discussed plan of care with him.  
Calculated serum osmolality 75, hypotonic likely in the setting of hypovolemia  - Patient received septic bolus  - RESOLVED  
Continue Synthroid  
Continue home eliquis and amiodarone  
Continue home synthroid  
Continue intravenous antimicrobial therapy  Shock has resolved - he is off vasopressor (levophed).  Sepsis is improving  Taper hydrocortisone, 50 mg IV daily  
Creatinine 1.44, improving (baseline ~1.07)  - Likely prerenal. Hold fluids for now given low EF and elevated BNP  - Trend  
Currently in atrial fibrillation with RVR.   Rate controlled  Continue amiodarone and metoprolol  Holding home eliquis pending plan for operative interventions, resume when appropriate  
Edema and Erythema of Left Elbow Joint - Native Joint  Significant Tenderness to palpation.   CT elbow without signs of abscess or septic arthritis, shows cellulitis; continue antibiotics  MRI elbow shows no septic arthritis, does show joint effusion  Orthopedic surgery and IR where consulted: No fluid for tapping   Likely cellulitis and continue Ancef    
Elevated in the presence of septic shock  - Trend, fluid resuscitation  
From a mix of dehydration and possible sepsis  Fluids  Trend  
From diarrhea  Replace    
Group A streptococcus Bacteremia, likely from cellulitis  Was in Septic Shock with acute respiratory failure and kidney failure  Initially given Fluid administration per sepsis protocol.   Started on Pressor Levophed on 11/5 -> D/Cd on 11/8.    Started on Hydrocortisone 11/6 - Stress dose, hx of adrenal insuff.  Abdominal CT and Chest Xray unremarkable.  TTE without evidence of endocarditis  UA mild UTI, culture negative to date.  Suspected left elbow as infection source.    ID consulted, pt started on cefepime and flagyl empirically.     On 11/6 daptomycin was added for Enterococcus coverage.    On 11/7 cefepime, flagyl, & daptomycin D/Cd. Started IV Ancef 2 g every 8 hours. Rpt Blood Cx obtained.   Not able to get fluid from his elbow joint  Likely cell repeat blood cultures negative  Continue Ancef per ID recommendations for at least for 4 weeks  
History of nonischemic cardiomyopathy, unclear etiology however previously reduced ejection fraction-resolved  - Last echo 2021 with EF of 55%  - Repeat echocardiogram ordered  
Hx of Nonischemic Cardiomyopathy with ejection fraction 30% and improved to 60%  Echo from 2021 with EF of 55% and repeat echo showed 30% with global hypokinesis  Metoprolol, lisinopril, lasix, spironolactone  Follow up with cardiology as outpatient, patient might need ischemic cardiomyopathy work-up however since patient's tach and bacteremia,  Holding any work-up.  Control his atrial fibrillation  
Likely hypovolemic hyponatremia  NS  Serial BMP  
Likely reactive secondary to sepsis, Improving  - No active signs of bleeding  
Monitor and replace as needed, goal >4    
Normal anion gap acidosis likely secondary to normal saline infusion with drips  - Trend/monitor  
Outpatient follow up  
Resolved   Likely related to sepsis and dehydration   initially Creatinine of 3.13 and GFR of 22  -Avoid Nephrotoxins and Renally Dose Meds   Resolved with sepsis improvement    
Stable current platelet count is 137.  No signs of active bleeding.  Likely related to infection and sepsis  
Started 4 days prior to Admission  CT Abdomen unremarkable.  Improving slightly.  Will continue to monitor  Antidiarrheals prn    
Suspect that patient's symptoms are due to a gastroenteritis, has no abdominal pain.  However he is a resident in a nursing facility.  C. difficile sample sent.  CT abdomen pending  Given vancomycin and cefepime in ED  Send procalcitonin, if elevated continue antibiotics  
This is Septic shock Present on admission  SIRS criteria identified on my evaluation include: Tachycardia, with heart rate greater than 90 BPM and Tachypnea, with respirations greater than 20 per minute  Indicators of septic shock include: Severe sepsis present, persistent hypotension after 30 ml/kg completed, and initial lactate level result is >= 4 mmol/L.   Sources is: urine, possible abdominal/Skin/soft tissue/joint  Sepsis protocol initiated  Crystalloid Fluid Administration: Fluid resuscitation ordered per standard protocol - 30 mL/kg per current or ideal body weight  IV antibiotics as appropriate for source of sepsis  Reassessment: I have reassessed the patient's hemodynamic status         - Improving       - Blood cultures positive for strep bacteremia  - Unremarkable abdominal ct, cxr. Possible left heel wound vs left elbow as infectious source, urine slightly positive for infection       - ID consulted, appreciate recommendations  Continue cefepime and Flagyl (started 11/5)  Continue daptomycin, for enterococcus coverage (started 11/6)  Cefepime, Flagyl, Daptomycin stopped 11/7/22. IV Ancef 2g every 8 hours started  Repeat blood cultures, per ID  - Levophed stopped  - Taper hydrocortisone  - MRI left elbow pending for consideration of septic arthritis. To consider orthopedic surgery consult once imaging results  - Wound care  
Unclear etiology of CM  Left heart cath in 2018 was negative for CAD  EF 30% here, worse than prior  Continue home metoprolol, lisinopril; start aldactone  Follow up with cardiology as outpatient for further management  
Will need counseling when clinically appropriate  
Duration Of Freeze Thaw-Cycle (Seconds): 5
Post-Care Instructions: I reviewed with the patient in detail post-care instructions. Patient is to wear sunprotection, and avoid picking at any of the treated lesions. Pt may apply Vaseline to crusted or scabbing areas.
Total Number Of Aks Treated: 10
Consent: The patient's consent was obtained including but not limited to risks of crusting, scabbing, blistering, scarring, darker or lighter pigmentary change, recurrence, incomplete removal and infection.
Render Post-Care Instructions In Note?: no
Number Of Freeze-Thaw Cycles: 1 freeze-thaw cycle
Detail Level: Detailed

## 2024-04-23 NOTE — CONSULTS
"Brief Behavioral Health Note    Length of Intervention: 30 minutes    HPI  Sebastián Castro is a 56 year old male with a past medical history of atrial fibrillation, diabetes, diabetic neuropathy, CHF, secondary hypercoagulable state, colostomy, suicidal ideations.    Summary of Interview:  Patient was seen today lying on hospital bed, with sitter at bedside, alert and oriented. Patient appears more depressed, flat affect, fatigue, lacking motivation to engage in his own self care independently, and continuing to endorse suicidal ideations with the plan of cutting throat or wrists. Patient states if he continues to have this colostomy bag, he believes he will commit suicide. Patient reports auditory hallucinations with commands, \"you cannot live this way, just kill yourself, just cut your throat, you cannot live with this bag\". It is unclear if patient is in fact hearing voices.  Patient confirmed that he has been independent with the colostomy bag previous to this admission and cannot explain why he is now unable to regain the same level of independence.  It appears that perhaps there is a secondary gain for continuing to require hands on assistance with his colostomy maintenance. At this time, medical records indicate patient is medically cleared to discharge but the issue of the colostomy bag appears to continue to be a barrier.     Recommendations:  1. Legal Hold continues to be in place  2. One to one sitter  3. Please continue to encourage patient to practice managing is own colostomy care  4. Transfer to a inpatient psychiatric hospital when a bed becomes available  5. Please follow all Legal Hold protocols per policy    Thank you    Susan Ewing, Ph.D.       " dry, intact, no bleeding and no hematoma.

## 2024-06-09 NOTE — ED NOTES
Pt provided with information for medicaid resources. Cab called for ride to [facility] to filled scripts. Pt verbalized understanding of POC, Pt given discharge instructions/prescription called to [facility]/ home care instructions explained, pt verbalized understanding of instructions given, pt to [facility] via w/c.     The patient is Watcher - Medium risk of patient condition declining or worsening    Shift Goals  Clinical Goals: Safety  Patient Goals: GUZMAN  Family Goals: GUZMAN    Progress made toward(s) clinical / shift goals:    Problem: Safety - Medical Restraint  Goal: Remains free of injury from restraints (Restraint for Interference with Medical Device)  Outcome: Progressing     Problem: Pain - Standard  Goal: Alleviation of pain or a reduction in pain to the patient’s comfort goal  Outcome: Progressing     Problem: Skin Integrity  Goal: Skin integrity is maintained or improved  Outcome: Progressing     Problem: Fall Risk  Goal: Patient will remain free from falls  Outcome: Progressing       Patient is not progressing towards the following goals:

## 2024-06-10 NOTE — CARE PLAN
Problem: Hemodynamics  Goal: Patient's hemodynamics, fluid balance and neurologic status will be stable or improve  Outcome: Progressing  Note: Vitals WNL. Afebrile. Denies dizziness.      Problem: Respiratory  Goal: Patient will achieve/maintain optimum respiratory ventilation and gas exchange  Outcome: Progressing  Note: On RA. Respirations equal and unlabored. No SOB.    The patient is Stable - Low risk of patient condition declining or worsening    Shift Goals  Clinical Goals: no self half or others  Patient Goals: rest, reposition           Patient is a 74 y.o. female with medical history significant for   Patient Active Problem List   Diagnosis    Hypothyroidism    Seizure disorder (Multi)    Hyperlipidemia    Gastroesophageal reflux disease without esophagitis    Physical deconditioning    Primary osteoarthritis involving multiple joints    History of abdominal abscess    ASCUS with positive high risk HPV cervical    Gait disorder    Acquired stenosis of right nasolacrimal duct    Adjustment disorder with depressed mood    Atrophy of vagina    Breast pain    Chronic dacryocystitis of right lacrimal passage    Chronic dacryocystitis of right lacrimal sac    Diabetic peripheral neuropathy (Multi)    Hyperglycemia    Cervical stenosis of spine    Lumbar stenosis    Myelopathy, spondylogenic, cervical    Mild vitamin D deficiency    Neurocognitive disorder    Papillary carcinoma of thyroid (Multi)    Follicular adenocarcinoma of thyroid gland (Multi)    Parkinsonism (Multi)    Peripheral neuropathy    Pes planus    Plantar fasciitis    Prediabetes    Primary osteoarthritis of both knees    S/P cervical spinal fusion    Temporal lobe epilepsy (Multi)    TIA (transient ischemic attack)    Upper airway cough syndrome    Weakness of both lower extremities    Anxiety and depression    Cerebral ventriculomegaly    Pressure ulcer of right heel, stage 3 (Multi)    Bradycardia    Bilateral leg edema    who presents for follow up of her bilateral knee pain due to arthritis.  Patient states that her knees ache daily and she did get some relief from the gel shots last winter.  She resides at [facility], currently participating in PT sessions, attempting to stand and gait train with therapist.  The patient was approved for Durolane viscosupplementation injections again and presents today. The patient denies recent injury or trauma to the bilateral knees,  denies locking or catching, denies fever/chills, N/T or calf pain. The patient resides in [facility] and  is  with her today.     ROS: All other systems have been reviewed and are negative except as previously noted in history of present illness.    Gen: The patient is alert and oriented ×3, is in no acute distress, and appear their stated age and weight.  Psychiatric: Mood and affect are appropriate.  Eyes: Sclera are white, and pupils are round and symmetric.  ENT: Mucous membranes are moist.   Neck: Supple. Thyroid is midline.  Respiratory: Respirations are nonlabored, chest rise is symmetric.  Cardiac: Rate is regular by palpation of distal pulses.   Abdomen: Nondistended.  Integument: No obvious cutaneous lesions are noted. No signs of lymphangitis. No signs of systemic edema.    Musculoskeletal: bilateral knees  skin intact, no wounds or breakdown noted  mild lower leg edema  TTP joint line medially, left worse than right today  no knee effusion noted  compartments soft  no calf tenderness  sensation intact to light touch  motor intact including TA/GS/EHL  palpable DP/PT pulses 2+  Positive patellar crepitus  knee motion: 5-95 degrees, knee flexion contracture noted     XR: Previous images of bilateral knees reviewed personally by me today and reveal tricompartmental arthritis with joint space narrowing noted in medial and patellofemoral compartments, osteophyte formation and varus angulation. The patient's recent knee imaging can be classified as a Grade 4 on the Kellgren Geoffrey Scale for radiographic classification of osteoarthritis. Grade 4 is severe knee OA with large osteophytes, marked narrowing of joint space, severe sclerosis and definite deformity of bone ends.     IMP:  Problem List Items Addressed This Visit       Primary osteoarthritis of both knees - Primary          DISCUSSION: I discussed the conservative treatment options for osteoarthritis including physical therapy, NSAIDs and corticosteroid injection and briefly discussed indications for surgery. Patient should try to delay joint replacement  surgery for as long as possible. If the patient's joint problem affects their quality of life and cause significant restriction of their activities, they may want to consider joint replacement surgery. I reviewed the importance for adopting a low impact lifestyle and avoidance of joint overloading activities. I explained the risks and benefits of the injection.  Patient verbalized understanding and wishes to proceed with Durolane injection for the bilateral knees today.     Patient ID: Shikha Soliz is a 74 y.o. female.    L Inj/Asp: bilateral knee on 6/10/2024 1:52 PM  Indications: pain  Details: 21 G needle, anterolateral approach  Medications (Right): 60 mg sodium hyaluronate 60 mg/3 mL  Medications (Left): 60 mg sodium hyaluronate 60 mg/3 mL  Outcome: tolerated well, no immediate complications  Procedure, treatment alternatives, risks and benefits explained, specific risks discussed. Consent was given by the patient. Immediately prior to procedure a time out was called to verify the correct patient, procedure, equipment, support staff and site/side marked as required. Patient was prepped and draped in the usual sterile fashion.         PLAN:  Patient should observe for signs of infection and/or adverse reactions (redness, significant increase in pain, fever, nausea or vomiting) after receiving an injection today. If you develop any of the above symptoms, please contact my office. Patient should see the maximum effect in approximately 6 weeks and can receive another Durolane injection no sooner than 6 months from today. Patient will continue with activities as tolerated. Mobilize to prevent stiffness.  Follow up in 6 months, no x-rays needed. All questions were answered.

## 2024-06-18 NOTE — PROGRESS NOTES
Tele sitter at bedside.   [FreeTextEntry1] : Data reviewed:  CT chest [facility] 3/20/2024 : 1.9 cm left upper lobe nodule - very round and smooth, questionably connected to vessel. Scattered consolidative regions RUL, RML, lingula. Small airways inflammation.  PET [facility] 4/3/2024 : 1. Hypermetabolic 16 mm nodule at the posterior left upper lobe, SUV max 7.4, suspicious for malignancy.  2. Scattered FDG avid nodular and airspace opacities, some new, consistent with an infectious process (large airway and small airway disease).   PFT 04/10/2024 : mod fixed obstruction, FEV1 56%, TLC 79%, DLCO 32% / FENO 12  Impression: FDG avid 19mm nodule, very round COPD, never smoker, small airways inflammation on scan  Plan: Case was reviewed at [facility] today. There was consensus that this lesion is not vascular and no further imaging is required to establish that. Carcinoid is a consideration given the appearance. The lesion is amenable to bronchoscopic biopsy. This will have the additional benefit of allowing for BAL and culture given the other findings on the scan. We discussed the procedure in detail including the risks and benefits. She would like to proceed. I do still want to give trial of inhalers, have a call in to [facility], Dr Clifford Conti [phone]. If she needs resection she will need a CPET and this was not discussed today. I discussed all of this on the phone with Dr Rogers today. -- Spoke to Dr Conti, ok to use Trelegy - he will follow her more closely. Reviewed situation. -- Bronch bx: NE tumor / spoke to her +  / CPET and see Dr Rader / d/w Dr Rogers -- CPET [facility] 5/2024: peak VO2 is 15.7 ml/kg/min, and on Trelegy FEV1 is 79% Spoke to her. She feels great on Trelegy (stopped coughing) and she sees Dr Rader tomorrow.

## 2024-08-29 NOTE — ASSESSMENT & PLAN NOTE
Uncontrolled with hyperglycemia. A1C 9.2%. FS have remained 180 or less over the last 24 hours.  - Holding oral hypoglycemic agents  - SSI and hypoglycemia protocol  - goal FS< 180 for better wound healing   alert Bcc Infundibulocystic Histology Text: There were aggregates of basaloid cells demonstrating an infundibulocystic pattern.

## 2024-10-30 NOTE — PROGRESS NOTES
Spiritual Care Note    Patient Information     Patient's Name: Sebastián Castro   MRN: [id number]    YOB: 1965   Age and Gender: 56 y.o. male   Service Area: U   Room (and Bed): [place] [address]   Ethnicity or Nationality:     Primary Language: English   Mandaen/Spiritual preference: Holiness   Place of Residence: [place]   Family/Friends/Others Present: No   Clinical Team Present: No   Medical Diagnosis(-es)/Procedure(s): Syncope and collapse   Code Status: Full Code    Date of Admission: 2/9/2021   Length of Stay: 139 days        Spiritual Care Provider Information:  Name of Spiritual Care Provider: Ammy Hess  Title of Spiritual Care Provider: Associate   Phone Number: [phone]  E-mail: [email or web address]  Total time : 10 minutes    Encounter/Request Information  Encounter/Request Type   Visited With: Patient  Nature of the Visit: Follow-up, On shift  General Visit: Yes  Referral From/ Origin of Request: [place] rounds, Verbal patient (Complex Discharge rounds)    Religous Needs/Values  Mandaen Needs Visit  Mandaen Needs: Prayer    Spiritual Assessment     Spiritual Care Encounters    Observations/Symptoms: Accepting, Thankfulness    Interaction/Conversation: Pt requested prayer and thanked the .    Assessment: Need    Need: Seeking Spiritual Assistance and Support    Distress: Anxiety about the Future, Overwhelmed    Interventions: Compassionate presence, prayer.    Outcomes: Spiritual Comfort    Plan: Weekly Visits    Notes:             independent

## 2024-12-10 NOTE — CARE PLAN
Problem: Communication  Goal: The ability to communicate needs accurately and effectively will improve  Intervention: Educate patient and significant other/support system about the plan of care, procedures, treatments, medications and allow for questions  Note:   Educated patient to voice questions and concerns regarding plan of care, patient verbalizes understanding.     Problem: Safety  Goal: Will remain free from falls  Intervention: Implement fall precautions  Note:   Safety precautions and fall prevention in place. Fall prevention education provided. Patient verbalized understanding. Bed in low locked position, bed alarm on, treaded socks on patient, call bell within reach. Patient calls appropriately as needed.       Pharmacy Vancomycin Initial Note  Date of Service December 10, 2024  Patient's  1949  75 year old, male    Indication: Bacteremia and Sepsis    Current estimated CrCl = Estimated Creatinine Clearance: 87.7 mL/min (based on SCr of 0.73 mg/dL).    Creatinine for last 3 days  12/10/2024:  5:19 AM Creatinine 0.73 mg/dL    Recent Vancomycin Level(s) for last 3 days  No results found for requested labs within last 3 days.      Vancomycin IV Administrations (past 72 hours)                     vancomycin (VANCOCIN) 1,750 mg in 0.9% NaCl 517.5 mL intermittent infusion (mg) 1,750 mg New Bag 12/10/24 0609                    Nephrotoxins and other renal medications (From now, onward)      Start     Dose/Rate Route Frequency Ordered Stop    12/10/24 0640  norepinephrine (LEVOPHED) 4 mg in  mL infusion PREMIX         0.01-0.6 mcg/kg/min × 73.2 kg  2.7-164.7 mL/hr  Intravenous CONTINUOUS 12/10/24 0637      12/10/24 0540  vancomycin (VANCOCIN) 1,750 mg in 0.9% NaCl 517.5 mL intermittent infusion         1,750 mg  over 2 Hours Intravenous ONCE 12/10/24 0537              Contrast Orders - past 72 hours (72h ago, onward)      None            [employer] Prediction of Planned Initial Vancomycin Regimen  Loading dose: N/A  Regimen: 1000 mg IV every 12 hours.  Start time: 18:09 on 12/10/2024  Exposure target: AUC24 (range)400-600 mg/L.hr   AUC24,ss: 564 mg/L.hr  Probability of AUC24 > 400: 83 %  Ctrough,ss: 18 mg/L  Probability of Ctrough,ss > 20: 41 %  Probability of nephrotoxicity (Lodise LIANNA ): 14 %          Plan:  Start vancomycin  1000 mg IV q12h.   Vancomycin monitoring method: AUC  Vancomycin therapeutic monitoring goal: 400-600 mg*h/L  Pharmacy will check vancomycin levels as appropriate in 1-3 Days.    Serum creatinine levels will be ordered daily for the first week of therapy and at least twice weekly for subsequent weeks.      Fish Dueñas, [facility]

## 2025-01-30 NOTE — PROGRESS NOTES
Ambulatory upon arrival for tx. Pt reports feeling very tired since last treatment and that she has noticed some hair falling out.    Patient seen by TESSY

## 2025-03-06 NOTE — PROGRESS NOTES
Cellulitis resolving  WBC normalizing  No surgery at this time  ABX per ID   [FreeTextEntry6] : 6 d ago woke crying with ear pain, 2d later UC started cefdinir for LOM Scheduled for PE tubes 3/12.  Fever today. 102  Had 7 doses of cefdinir 125 -3.5 ml bid.

## 2025-06-25 ENCOUNTER — TELEPHONE (OUTPATIENT)
Dept: CARDIOLOGY | Facility: MEDICAL CENTER | Age: 60
End: 2025-06-25

## 2025-06-25 ENCOUNTER — OFFICE VISIT (OUTPATIENT)
Dept: CARDIOLOGY | Facility: MEDICAL CENTER | Age: 60
End: 2025-06-25
Attending: NURSE PRACTITIONER
Payer: MEDICARE

## 2025-06-25 VITALS
HEART RATE: 77 BPM | OXYGEN SATURATION: 98 % | BODY MASS INDEX: 34.13 KG/M2 | HEIGHT: 72 IN | SYSTOLIC BLOOD PRESSURE: 118 MMHG | RESPIRATION RATE: 14 BRPM | DIASTOLIC BLOOD PRESSURE: 68 MMHG | WEIGHT: 252 LBS

## 2025-06-25 DIAGNOSIS — I48.11 LONGSTANDING PERSISTENT ATRIAL FIBRILLATION (HCC): Primary | ICD-10-CM

## 2025-06-25 DIAGNOSIS — Z79.899 HIGH RISK MEDICATION USE: ICD-10-CM

## 2025-06-25 DIAGNOSIS — D68.69 SECONDARY HYPERCOAGULABLE STATE (HCC): ICD-10-CM

## 2025-06-25 DIAGNOSIS — I42.8 NONISCHEMIC CARDIOMYOPATHY (HCC): ICD-10-CM

## 2025-06-25 DIAGNOSIS — D64.9 ANEMIA, UNSPECIFIED TYPE: ICD-10-CM

## 2025-06-25 DIAGNOSIS — I10 HTN (HYPERTENSION), MALIGNANT: ICD-10-CM

## 2025-06-25 DIAGNOSIS — E78.2 MIXED HYPERLIPIDEMIA: ICD-10-CM

## 2025-06-25 LAB — EKG IMPRESSION: NORMAL

## 2025-06-25 PROCEDURE — 99214 OFFICE O/P EST MOD 30 MIN: CPT | Performed by: NURSE PRACTITIONER

## 2025-06-25 PROCEDURE — 93010 ELECTROCARDIOGRAM REPORT: CPT | Performed by: STUDENT IN AN ORGANIZED HEALTH CARE EDUCATION/TRAINING PROGRAM

## 2025-06-25 PROCEDURE — 93005 ELECTROCARDIOGRAM TRACING: CPT | Mod: TC | Performed by: NURSE PRACTITIONER

## 2025-06-25 PROCEDURE — 3074F SYST BP LT 130 MM HG: CPT | Performed by: NURSE PRACTITIONER

## 2025-06-25 PROCEDURE — 99213 OFFICE O/P EST LOW 20 MIN: CPT | Performed by: NURSE PRACTITIONER

## 2025-06-25 PROCEDURE — 3078F DIAST BP <80 MM HG: CPT | Performed by: NURSE PRACTITIONER

## 2025-06-25 RX ORDER — IPRATROPIUM BROMIDE AND ALBUTEROL SULFATE 2.5; .5 MG/3ML; MG/3ML
3 SOLUTION RESPIRATORY (INHALATION) EVERY 4 HOURS PRN
COMMUNITY

## 2025-06-25 RX ORDER — SENNOSIDES 8.6 MG/1
8.6 TABLET ORAL EVERY 12 HOURS
COMMUNITY

## 2025-06-25 RX ORDER — HYDROCHLOROTHIAZIDE 12.5 MG/1
CAPSULE ORAL
COMMUNITY
Start: 2025-06-18

## 2025-06-25 RX ORDER — TRIAMCINOLONE ACETONIDE 1 MG/G
CREAM TOPICAL EVERY 12 HOURS
COMMUNITY

## 2025-06-25 ASSESSMENT — MINNESOTA LIVING WITH HEART FAILURE QUESTIONNAIRE (MLHF)
MAKING YOU SHORT OF BREATH: 1
COSTING YOU MONEY FOR MEDICAL CARE: 0
TOTAL_SCORE: 5
LOSS OF SELF CONTROL IN YOUR LIFE: 0
DIFFICULTY SLEEPING WELL AT NIGHT: 0
GIVING YOU SIDE EFFECTS FROM TREATMENTS: 0
TIRED, FATIGUED OR LOW ON ENERGY: 1
DIFFICULTY WORKING TO EARN A LIVING: 1
DIFFICULTY WITH SEXUAL ACTIVITIES: 0
DIFFICULTY GOING AWAY FROM HOME: 0
WORKING AROUND THE HOUSE OR YARD DIFFICULT: 0
DIFFICULTY SOCIALIZING WITH FAMILY OR FRIENDS: 1
DIFFICULTY WITH RECREATIONAL PASTIMES, SPORTS, HOBBIES: 1
MAKING YOU FEEL DEPRESSED: 0
HAVING TO SIT OR LIE DOWN DURING THE DAY: 0
SWELLING IN ANKLES OR LEGS: 0
EATING LESS FOODS YOU LIKE: 0
DIFFICULTY TO CONCENTRATE OR REMEMBERING THINGS: 0
MAKING YOU WORRY: 0
MAKING YOU STAY IN A HOSPITAL: 0
WALKING ABOUT OR CLIMBING STAIRS DIFFICULT: 0
FEELING LIKE A BURDEN TO FAMILY AND FRIENDS: 0

## 2025-06-25 ASSESSMENT — ENCOUNTER SYMPTOMS
PND: 0
FEVER: 0
MYALGIAS: 0
ABDOMINAL PAIN: 0
PALPITATIONS: 1
DIZZINESS: 0
SHORTNESS OF BREATH: 0
ORTHOPNEA: 0
CLAUDICATION: 0
COUGH: 0

## 2025-06-25 ASSESSMENT — FIBROSIS 4 INDEX
FIB4 SCORE: 2.27
FIB4 SCORE: 2.27

## 2025-06-25 NOTE — PROGRESS NOTES
Chief Complaint   Patient presents with    Atrial Fibrillation       Subjective     Sebastián Castro is a 60 y.o. male who presents today for follow-up on his A-fib, nonischemic cardiomyopathy with his caretaker, Rai.    Patient of Dr. Larson.  Patient was last seen in clinic on 11/28/2023 with Zenia Stratton PA-C.  At that visit, no changes were made to his regimen.    Over the last couple of years, patient reports he has been doing fairly well.  Aside from feeling his A-fib on occasion, he denies chest pain, orthopnea, PND, edema, shortness of breath or dizziness/lightheadedness.    He currently residing at [facility].    He mentions he takes his diuretics as needed and feels he takes it about 1-2 times per week.    He receives physical therapy 2 days a week    Patient walks a little bit about 300 yards at a time    He uses continuous oxygen at 2 L    His weights are stable around 251 pounds.    Patient is requesting clearance for teeth extractions.    Additionally, patient has the following medical problems:     -Diabetes    -Hyperlipidemia    -Chronic venous insufficiency    -Obesity    Past Medical History[1]  Past Surgical History[2]  Family History   Problem Relation Age of Onset    No Known Problems Mother     No Known Problems Father      Social History     Socioeconomic History    Marital status:      Spouse name: Not on file    Number of children: Not on file    Years of education: Not on file    Highest education level: Not on file   Occupational History    Not on file   Tobacco Use    Smoking status: Never    Smokeless tobacco: Never   Vaping Use    Vaping status: Never Used   Substance and Sexual Activity    Alcohol use: No    Drug use: No    Sexual activity: Not on file   Other Topics Concern    Not on file   Social History Narrative    Not on file     Social Drivers of Health     Financial Resource Strain: Low Risk  (10/21/2019)    Overall Financial Resource Strain (CARDIA)     Difficulty of  Paying Living Expenses: Not hard at all   Food Insecurity: No Food Insecurity (10/21/2019)    Hunger Vital Sign     Worried About Running Out of Food in the Last Year: Never true     Ran Out of Food in the Last Year: Never true   Transportation Needs: No Transportation Needs (10/21/2019)    PRAPARE - Transportation     Lack of Transportation (Medical): No     Lack of Transportation (Non-Medical): No   Physical Activity: Not on file   Stress: Not on file   Social Connections: Not on file   Intimate Partner Violence: Not on file   Housing Stability: Not on file     Allergies[3]  Encounter Medications[4]  Review of Systems   Constitutional:  Negative for fever and malaise/fatigue.   Respiratory:  Negative for cough and shortness of breath.    Cardiovascular:  Positive for palpitations. Negative for chest pain, orthopnea, claudication, leg swelling and PND.   Gastrointestinal:  Negative for abdominal pain.   Musculoskeletal:  Negative for myalgias.   Neurological:  Negative for dizziness.   All other systems reviewed and are negative.             Objective     /68 (BP Location: Left arm, Patient Position: Sitting, BP Cuff Size: Adult)   Pulse 77   Resp 14   Ht 1.829 m (6')   Wt 114 kg (252 lb)   SpO2 98%   BMI 34.18 kg/m²     Physical Exam  Vitals reviewed.   Constitutional:       Appearance: He is well-developed. He is obese.      Comments: Using wheelchair today   HENT:      Head: Normocephalic and atraumatic.   Eyes:      Pupils: Pupils are equal, round, and reactive to light.   Neck:      Vascular: No JVD.   Cardiovascular:      Rate and Rhythm: Normal rate and regular rhythm.      Heart sounds: Normal heart sounds.   Pulmonary:      Effort: Pulmonary effort is normal. No respiratory distress.      Breath sounds: Normal breath sounds. No wheezing or rales.   Abdominal:      General: Bowel sounds are normal.      Palpations: Abdomen is soft.   Musculoskeletal:         General: Normal range of motion.       Cervical back: Normal range of motion and neck supple.      Right lower leg: No edema.      Left lower leg: No edema.   Skin:     General: Skin is warm and dry.   Neurological:      General: No focal deficit present.      Mental Status: He is alert and oriented to person, place, and time.   Psychiatric:         Behavior: Behavior normal.       Lab Results   Component Value Date/Time    CHOLSTRLTOT 146 02/14/2021 01:30 AM    LDL 82 02/14/2021 01:30 AM    HDL 32 (A) 02/14/2021 01:30 AM    TRIGLYCERIDE 160 (H) 02/14/2021 01:30 AM       Lab Results   Component Value Date/Time    SODIUM 139 11/07/2023 07:05 AM    POTASSIUM 3.4 (L) 11/07/2023 07:05 AM    CHLORIDE 100 11/07/2023 07:05 AM    CO2 31 11/07/2023 07:05 AM    GLUCOSE 99 11/07/2023 07:05 AM    BUN 28 (H) 11/07/2023 07:05 AM    CREATININE 1.28 11/07/2023 07:05 AM     Lab Results   Component Value Date/Time    ALKPHOSPHAT 77 11/04/2023 11:39 AM    ASTSGOT 20 11/04/2023 11:39 AM    ALTSGPT 10 11/04/2023 11:39 AM    TBILIRUBIN 0.9 11/04/2023 11:39 AM        Transthoracic Echo Report 1/27/2018  Technically difficult but adequate study for interpretation.   Contrast was used to enhance visualization of the endocardial border.  Normal left ventricular chamber size.  Normal left ventricular systolic function.  Left ventricular ejection fraction is visually estimated to be 60%.  Mild concentric left ventricular hypertrophy.  Trace mitral regurgitation.  Normal left atrial size.  Structurally normal aortic valve without significant stenosis or   regurgitation.  Mild tricuspid regurgitation.  Estimated right ventricular systolic pressure  is 35 mmHg.  Normal inferior vena cava size and inspiratory collapse.  Normal right ventricular size and systolic function.     Myocardial Perfusion Report 1/29/2018   NUCLEAR IMAGING INTERPRETATION    No evidence of significant jeopardized viable myocardium or prior   myocardial    infarction.    Normal left ventricular size, ejection  fraction, and wall motion.     Transthoracic Echo Report 7/9/2018  Moderately reduced left ventricular systolic function.  Mild concentric left ventricular hypertrophy.  Aortic sclerosis without stenosis.  Estimated right ventricular systolic pressure  is 30 mmHg.  Compared to the images of the study done on 01/27/2018 there has been reduction in left ventricular ejection fraction.    Holter monitor 8/16/2018  Interpretive Statements   *   Monitoring started at 11:26 AM and continued for 24 hours. Overall rhythm was Atrial Fibrillation. The average heart rate was 86 BPM. The minimum heart rate was 50 BPM, occurring at 11:27:28 PM. The maximum heart rate was 141 BPM, occurring at 3:31:36 PM.     *  Rare wide complex beats.  May well represent atrial beats with aberrancy.     *   Diary Entries- Symptoms listed in diary correlated with a heart rate between  BPM with one isolated PVC.     Transthoracic Echo Report 11/22/2018  Prior echo done 07/09/18, EF now normal.  Quality improved with contrast.  Left ventricular ejection fraction is visually estimated to be 60%.  Mild concentric left ventricular hypertrophy.  Mild mitral annular calcifcation, mild MR.  Mild tricuspid regurgitation.  Estimated right ventricular systolic pressure  is 30 mmHg.    Transthoracic Echo Report 9/7/2020  Compared to the study done 7/9/2018 - LVEF is reduced from 40% to 30%.  Normal left ventricular chamber size.  Left ventricular ejection fraction is visually estimated to be 30%.  Mild concentric left ventricular hypertrophy.  Mild mitral regurgitation.  Right ventricular systolic pressure is estimated to be 40 mmHg.    Transthoracic Echo Report 11/11/2020  Normal left ventricular chamber size.  Moderately reduced left ventricular systolic function.  Left ventricular ejection fraction is visually estimated to be 30-40%;   variable related to atrial fibrillation.  Mild mitral regurgitation.  Moderately dilated right ventricle.  Reduced  right ventricular systolic function.  Right heart pressures are normal.  No prior study is available for comparison.      Transthoracic Echo Report 2/10/2021  Compared to the images of the study done on 11/11/2020 - there has been normalization of ejection fraction previously 35%.  Normal left ventricular systolic function.  Left ventricular ejection fraction is visually estimated to be 60%.  Diastolic function is difficult to assess with atrial fibrillation.  Normal right ventricular systolic function.  Mild  mitral regurgitation.     Transthoracic Echo Report 5/26/2021  Compared to the images of the prior study done 2/10/2021, no   significant changes are noted.  Left ventricular ejection fraction is visually estimated to be 55%.  Estimated right ventricular systolic pressure  is 25 mmHg.    Transthoracic Echo Report 11/5/2022  Severely reduced left ventricular systolic function.  The left ventricular ejection fraction is visually estimated to be 25-  30%, but may be underestimated due to tachycardia.  Global hypokinesis with more pronounced hypokinesis of the anterior and anterolateral walls.  Diastolic function is abnormal, but grade cannot be determined.  The right ventricle is not well visualized, but appears dilated with   reduced systolic function.  Biatrial dilation.  Mild to moderate tricuspid regurgitation.  Dilated inferior vena cava without inspiratory collapse.     Transthoracic Echo Report 12/23/2022  The left ventricular ejection fraction is visually estimated to be 60%.  Right ventricular systolic pressure is estimated to be 31 mmHg.  Left atrial volume index is 60 mL/sq m.  Moderate aortic insufficiency.    Date 6MWT MLWHF   6/25/2025 Not done 5                         Assessment & Plan     1. Longstanding persistent atrial fibrillation (HCC)  EKG    CBC WITH DIFFERENTIAL    Comp Metabolic Panel    Lipid Profile    CANCELED: EKG    CANCELED: EKG      2. Nonischemic cardiomyopathy (HCC)   EC-ECHOCARDIOGRAM COMPLETE W/O CONT    EC-ECHOCARDIOGRAM COMPLETE W/O CONT    CBC WITH DIFFERENTIAL    Comp Metabolic Panel    Lipid Profile      3. Anemia, unspecified type  CBC WITH DIFFERENTIAL    Comp Metabolic Panel    Lipid Profile      4. Mixed hyperlipidemia  CBC WITH DIFFERENTIAL    Comp Metabolic Panel    Lipid Profile      5. Secondary hypercoagulable state (HCC)  CBC WITH DIFFERENTIAL    Comp Metabolic Panel    Lipid Profile      6. High risk medication use        7. HTN (hypertension), malignant            Medical Decision Making: Today's Assessment/Status/Plan:   Will attempt to obtain any updated lab testing from [facility]     Longstanding persistent A-fib versus chronic A-fib:  - EKG today shows patient A-fib rate 77  - Recommend continued rate control  - Continue Eliquis 5 mg twice a day  - Continue metoprolol SR 50 mg daily    Nonischemic cardiomyopathy, last LVEF 60%:  - Repeat echo  - Patient to complete CBC, CMP and lipid panel soon  - Continue metoprolol SR 50 mg daily  - Continue furosemide 20 mg daily as needed  - Continue Farxiga 10 mg daily    Hyperlipidemia:  - Currently off of statin  - Repeat lipid panel pending    Hypertension:  - BP stable  - Continue HCTZ 12.5 mg daily  - Continue metoprolol SR 50 mg daily    Anemia:  - Pending CBC    At this time, patient may proceed with dental procedure and extractions.  He can hold his Eliquis 2 days prior to his procedure.    FU in clinic in 6 months with Dr. Larson, with labs and echo. Sooner if needed.    Patient verbalizes understanding and agrees with the plan of care.     PLEASE NOTE: This Note was created using voice recognition Software. I have made every reasonable attempt to correct obvious errors, but I expect that there are errors of grammar and possibly content that I did not discover before finalizing the note                     [1]   Past Medical History:  Diagnosis Date    A-fib (HCC)     Abscess 11/8/2020    Bacteremia 11/8/2020     Chest pain     Congestive heart failure (HCC)     Diabetes (HCC)     Diabetic neuropathy (HCC)     Hypercholesterolemia     Hypertension     Longstanding persistent atrial fibrillation ([facility]) 11/8/2020    LV dysfunction 11/8/2020    Morbid obesity ([facility])     NICM (nonischemic cardiomyopathy) ([facility]) 11/8/2020    Noncompliance     Normal cardiac stress test     Orthostatic hypotension     Sepsis ([facility]) 11/8/2020   [2]   Past Surgical History:  Procedure Laterality Date    VA UPPER GI ENDOSCOPY,DIAGNOSIS N/A 11/6/2023    Procedure: GASTROSCOPY;  Surgeon: Handy Pereyra M.D.;  Location: SURGERY SAME DAY [facility];  Service: Gastroenterology    VA EXPLORATORY OF ABDOMEN  5/27/2021    Procedure: LAPAROTOMY, EXPLORATORY;  Surgeon: Kin Desouza D.O.;  Location: [facility];  Service: General    COLOSTOMY TAKEDOWN  5/27/2021    Procedure: COLOSTOMY TAKEDOWN AND CLOSURE;  Surgeon: Kin Desouza D.O.;  Location: SURGERY [facility];  Service: General    COLECTOMY N/A 11/10/2020    Procedure: COLECTOMY-SEGMENTAL, COLOSTOMY, MOBILIZATION OF SPLENIC FLEXURE, WOUND VAC PLACEMENT, IRRIGATION AND DEBRIDMENT FLANK WOUND;  Surgeon: Kin Desouza D.O.;  Location: SURGERY [facility];  Service: General    Z CARDIAC CATH  07/21/2018    EF 45%, normal coronaries.    IRRIGATION & DEBRIDEMENT ORTHO Right 2/19/2018    Procedure: IRRIGATION & DEBRIDEMENT ORTHO-FOOT;  Surgeon: Kirby Lopez M.D.;  Location: [facility];  Service: Orthopedics    TOE AMPUTATION Right 1/17/2018    Procedure: TOE AMPUTATION-1ST RAY;  Surgeon: Doug Delong M.D.;  Location: [facility];  Service: Orthopedics    TOE AMPUTATION Right 11/10/2017    Procedure: TOE AMPUTATION;  Surgeon: Osorio Leo M.D.;  Location: [facility];  Service: Orthopedics   [3]   Allergies  Allergen Reactions    Zosyn [Piperacillin Sod-Tazobactam So] Rash and Itching     Redness/flushed throughout body.     Daptomycin Rash     Body  rash  RXN=1/2018      Pcn [Penicillins] Hives and Rash      Rash & hives  RXN=since a kid  Tolerates cephalosporins    Unasyn [Ampicillin-Sulbactam Sodium] Hives, Itching and Swelling     Received doses as recently as 2017 without incident. On 2/18/2018 developed a rash after receiving Unasyn.    Vancomycin Rash     Red man syndrome. Tolerates IV vancomycin at reduced infusion rate.   [4]   Outpatient Encounter Medications as of 6/25/2025   Medication Sig Dispense Refill    hydrochlorothiazide (MICROZIDE) 12.5 MG capsule       GUAIFENESIN ER PO Take 600 mg by mouth every 12 hours.      ipratropium-albuterol (DUONEB) 0.5-2.5 (3) MG/3ML nebulizer solution Take 3 mL by nebulization every four hours as needed for Shortness of Breath.      SALINE NASAL MIST NA Administer  into affected nostril(S).      sennosides (SENOKOT) 8.6 MG Tab Take 8.6 mg by mouth every 12 hours. (Patient taking differently: Take 8.6 mg by mouth every 12 hours. Two tablets ever 12 hours as needed)      triamcinolone acetonide (KENALOG) 0.1 % Cream Apply  topically every 12 hours.      loperamide (IMODIUM) 2 MG Cap Take 2 mg by mouth 4 times a day as needed for Diarrhea. (Patient taking differently: Take 2 mg by mouth every 6 hours as needed for Diarrhea.)      furosemide (LASIX) 20 MG Tab Take 1 Tablet by mouth as needed (Take 1 as needed for any wieght gain of 3lbs on a day or 5lbs in a week or any increased lower leg swelling or shortness of breath.). 30 Tablet 3    dapagliflozin propanediol (FARXIGA) 10 MG Tab Take 1 Tablet by mouth every day. 30 Tablet 11    metoprolol SR (TOPROL XL) 100 MG TABLET SR 24 HR Take 50 mg by mouth every day.      therapeutic multivitamin-minerals (THERAGRAN-M) Tab Take 1 Tablet by mouth every day.      PARoxetine (PAXIL) 30 MG Tab Take 30 mg by mouth every day. (Patient taking differently: Take 40 mg by mouth every day.)      ROPINIRole (REQUIP) 0.25 MG Tab Take 0.5 mg by mouth at bedtime. (Patient taking  differently: Take 0.25 mg by mouth at bedtime.)      traZODone (DESYREL) 50 MG Tab Take 50 mg by mouth every evening.      ziprasidone (GEODON) 40 MG Cap Take 60 mg by mouth 2 times a day.      apixaban (ELIQUIS) 5mg Tab Take 1 Tablet by mouth 2 times a day. Indications: Thromboembolism secondary to Atrial Fibrillation 180 Tablet 3    clonazePAM (KLONOPIN) 0.5 MG Tab Take  by mouth 2 times a day.      levothyroxine (SYNTHROID) 150 MCG Tab Take 1 tablet by mouth every morning on an empty stomach. (Patient taking differently: Take 175 mcg by mouth every morning on an empty stomach.) 30 tablet     tamsulosin (FLOMAX) 0.4 MG capsule Take 1 capsule by mouth 1/2 hour after breakfast. 30 capsule     omeprazole (PRILOSEC) 40 MG delayed-release capsule Take 1 capsule by mouth every 12 hours. (Patient taking differently: Take 20 mg by mouth every 12 hours.) 30 capsule     vitamin D (VITAMIND D3) 1000 UNIT Tab Take 1 tablet by mouth every day. 60 tablet     [DISCONTINUED] Magnesium Oxide (MAG-OX PO) Take  by mouth. (Patient not taking: Reported on 6/25/2025)      [DISCONTINUED] spironolactone (ALDACTONE) 25 MG Tab Take 1 Tablet by mouth every day. (Patient not taking: Reported on 6/25/2025) 30 Tablet 3     No facility-administered encounter medications on file as of 6/25/2025.

## 2025-06-25 NOTE — TELEPHONE ENCOUNTER
Requested records from [facility]    Pending records   *Confirmation fax sent to scan*

## 2025-07-09 ENCOUNTER — ANCILLARY PROCEDURE (OUTPATIENT)
Facility: MEDICAL CENTER | Age: 60
End: 2025-07-09
Attending: INTERNAL MEDICINE
Payer: MEDICARE

## 2025-07-09 PROCEDURE — 93306 TTE W/DOPPLER COMPLETE: CPT

## 2025-07-10 ENCOUNTER — RESULTS FOLLOW-UP (OUTPATIENT)
Dept: CARDIOLOGY | Facility: MEDICAL CENTER | Age: 60
End: 2025-07-10

## 2025-07-10 LAB
LV EJECT FRACT  99904: 54
LV EJECT FRACT MOD 2C 99903: 47.76
LV EJECT FRACT MOD 4C 99902: 57.01
LV EJECT FRACT MOD BP 99901: 53.53

## 2025-07-10 PROCEDURE — 93306 TTE W/DOPPLER COMPLETE: CPT | Mod: 26 | Performed by: INTERNAL MEDICINE

## 2025-07-10 NOTE — LETTER
2025     Sebastián Castro  [address] [facility] [place] [address]      Dear Sebastián:    Below are the results from your recent visit:    Resulted Orders   EC-ECHOCARDIOGRAM COMPLETE W/O CONT   Result Value Ref Range    Eject.Frac. MOD BP 53.53     Eject.Frac. MOD 4C 57.01     Eject.Frac. MOD 2C 47.76     Left Ventrical Ejection Fraction 54     Narrative    Transthoracic  Echo Report      Echocardiography Laboratory    CONCLUSIONS  Normal left ventricular systolic function.   Mild mitral regurgitation.  Moderate aortic insufficiency.    MARTHAALFONSOSEBASTIÁN VENCES  Exam Date:         2025                      07:32  Exam Location:     Out Patient  Priority:          Routine    Ordering Physician:        ROSA SEGOVIA                               ([id number])  Referring Physician:       [id number]  Sonographer:               Deneen Downey    Age:    60     Gender:    M  MRN:    [id number]  :    1965  BSA:    2.35   Ht (in):    72     Wt (lb):    252  Exam Type:     Complete    Indications:     Other cardiomyopathies  ICD Codes:       I42.8    CPT Codes:       13667    BP:   118    /   68     HR:   60  Technical Quality:       Technically difficult study -                            adequate information is obtained    MEASUREMENTS  (Male / Female) Normal Values  2D ECHO  LV Diastolic Diameter PLAX        6.2 cm                4.2 - 5.9 / 3.9 - 5.3   cm  LV Systolic Diameter PLAX         4.6 cm                2.1 - 4.0 cm  IVS Diastolic Thickness           0.8 cm                  LVPW Diastolic Thickness          0.8 cm                  LVOT Diameter                     2 cm                    Estimated LV Ejection Fraction    54 %                    LV Ejection Fraction MOD BP       53.5 %                >= 55  %  LV Ejection Fraction MOD 4C       57 %                    LV Ejection Fraction MOD 2C       47.8 %                  LV Ejection Fraction 4C AL        57.2 %                   LV Ejection Fraction 2C AL        50.6 %                  LA Volume Index                   50.6 cm3/m2           16 - 28 cm3/m2    DOPPLER  AV Peak Velocity                  1.4 m/s                 AV Peak Gradient                  8.1 mmHg                AV Mean Gradient                  4 mmHg                  AI Pressure Half Time             311 ms                  LVOT Peak Velocity                1.3 m/s                 AV Area Cont Eq vti               4.2 cm2                 Mitral E Point Velocity           1.4 m/s                 MV Pressure Half Time             45 ms                   MV Area PHT                       4.9 cm2                 MV Deceleration Time              154 ms                  TR Peak Velocity                  278 cm/s                PV Peak Velocity                  0.55 m/s                PV Peak Gradient                  1.2 mmHg                RVOT Peak Velocity                0.48 m/s                  * Indicates values subject to auto-interpretation  LV EF:  54    %    FINDINGS  Left Ventricle  Normal left ventricular chamber size. Normal left ventricular wall   thickness. Normal left ventricular systolic function. The ejection   fraction is measured to be 54% by Paulino's biplane. Wall motion is   difficult to assess with the limitations of image quality (no IV access   available). Normal diastolic function. Diastolic function is difficult   to assess with arrhythmia.    Right Ventricle  Normal right ventricular size and systolic function.    Right Atrium  Enlarged right atrium. Dilated inferior vena cava without inspiratory   collapse.    Left Atrium  Severely dilated left atrium. Left atrial volume index is 49 mL/sq m.    Mitral Valve  Thickened mitral valve leaflets. No mitral stenosis. Mild mitral   regurgitation.    Aortic Valve  Tricuspid aortic valve. No aortic valve stenosis. Moderate aortic   insufficiency. Pressure half time is  289 msec.    Tricuspid  Valve  Structurally normal tricuspid valve. No tricuspid stenosis. Mild   tricuspid regurgitation. Right atrial pressure is estimated to be 3   mmHg. Estimated right ventricular systolic pressure is 49 mmHg.    Pulmonic Valve  The pulmonic valve is not well visualized. No pulmonic stenosis. No   pulmonic insufficiency.    Pericardium  No pericardial effusion.    Aorta  Normal aortic root for body surface area. The ascending aorta diameter   is 3.6 cm.                      Sirena BAZAN To  (Electronically Signed)  Final Date:     10 July 2025                   09:46     Sebastián Castro,    We have reviewed your echocardiogram (ultrasound of your heart) and it is unchanged from previous. There are no recommended changes to your plan of care at this time. Please continue current medications and treatment recommendations. Please keep any currently scheduled follow up appointments.    Please let me know if you have any additional questions or concerns.     Edwige PÉREZ

## 2025-07-11 NOTE — TELEPHONE ENCOUNTER
----- Message from Nurse Practitioner ELIZABETH Sierra sent at 7/10/2025  4:54 PM PDT -----  Echo stable, no changes at this time.  ----- Message -----  From: Jessy Singletary R.N.  Sent: 7/10/2025   3:00 PM PDT  To: ELIZABETH Sierra    To [facility] for review, thank you!  ----- Message -----  From: Andrzej Ventura  Sent: 7/9/2025   9:32 AM PDT  To: [facility] Nurse's
